# Patient Record
Sex: FEMALE | Race: WHITE | NOT HISPANIC OR LATINO | Employment: UNEMPLOYED | URBAN - METROPOLITAN AREA
[De-identification: names, ages, dates, MRNs, and addresses within clinical notes are randomized per-mention and may not be internally consistent; named-entity substitution may affect disease eponyms.]

---

## 2017-03-27 ENCOUNTER — OFFICE VISIT (OUTPATIENT)
Dept: FAMILY MEDICINE | Facility: CLINIC | Age: 42
End: 2017-03-27
Payer: COMMERCIAL

## 2017-03-27 VITALS
DIASTOLIC BLOOD PRESSURE: 73 MMHG | BODY MASS INDEX: 36.14 KG/M2 | OXYGEN SATURATION: 97 % | TEMPERATURE: 100.5 F | WEIGHT: 204 LBS | HEIGHT: 63 IN | RESPIRATION RATE: 18 BRPM | SYSTOLIC BLOOD PRESSURE: 102 MMHG | HEART RATE: 91 BPM

## 2017-03-27 DIAGNOSIS — J45.20 MILD INTERMITTENT ASTHMA WITHOUT COMPLICATION: Primary | ICD-10-CM

## 2017-03-27 DIAGNOSIS — H01.00B BLEPHARITIS OF BOTH UPPER AND LOWER EYELID OF LEFT EYE, UNSPECIFIED TYPE: ICD-10-CM

## 2017-03-27 DIAGNOSIS — J01.90 ACUTE SINUSITIS WITH SYMPTOMS > 10 DAYS: ICD-10-CM

## 2017-03-27 PROCEDURE — 99214 OFFICE O/P EST MOD 30 MIN: CPT | Performed by: FAMILY MEDICINE

## 2017-03-27 NOTE — MR AVS SNAPSHOT
After Visit Summary   3/27/2017    Doretha Fernandez    MRN: [id number]           Patient Information     Date Of Birth          1975        Visit Information        Provider Department      3/27/2017 9:00 AM Anna Naidu MD [facility]        Today's Diagnoses     Mild intermittent asthma without complication    -  1    Acute sinusitis with symptoms > 10 days        Blepharitis of both upper and lower eyelid of left eye, unspecified type          Care Instructions          Thank you for choosing [facility].  You may be receiving a survey in the mail from [facility] regarding your visit today.  Please take a few minutes to complete and return the survey to let us know how we are doing.      Our Clinic hours are:  Mondays    7:20 am - 7 pm  Tues -  Fri  7:20 am - 5 pm    Clinic Phone: [phone]    The clinic lab opens at 7:30 am Mon - Fri and appointments are required.    [facility]  Ph. [phone]  Monday-Thursday 8 am - 7pm  Tues/Wed/Fri 8 am - 5:30 pm                 Follow-ups after your visit        Who to contact     If you have questions or need follow up information about today's clinic visit or your schedule please contact [facility] directly at [phone].  Normal or non-critical lab and imaging results will be communicated to you by DroneDeployhart, letter or phone within 4 business days after the clinic has received the results. If you do not hear from us within 7 days, please contact the clinic through DroneDeployhart or phone. If you have a critical or abnormal lab result, we will notify you by phone as soon as possible.  Submit refill requests through viseto or call your pharmacy and they will forward the refill request to us. Please allow 3 business days for your refill to be completed.          Additional Information About Your Visit        viseto Information     viseto gives you secure access to your electronic health  "record. If you see a primary care provider, you can also send messages to your care team and make appointments. If you have questions, please call your primary care clinic.  If you do not have a primary care provider, please call [phone] and they will assist you.        Care EveryWhere ID     This is your Care EveryWhere ID. This could be used by other organizations to access your [place] medical records  [id number]        Your Vitals Were     Pulse Temperature Respirations Height Pulse Oximetry BMI (Body Mass Index)    91 100.5  F (38.1  C) (Tympanic) 18 5' 3\" (1.6 m) 97% 36.14 kg/m2       Blood Pressure from Last 3 Encounters:   03/27/17 102/73   12/16/16 116/81   12/14/16 115/74    Weight from Last 3 Encounters:   03/27/17 204 lb (92.5 kg)   12/16/16 202 lb (91.6 kg)   12/14/16 203 lb (92.1 kg)              Today, you had the following     No orders found for display         Today's Medication Changes          These changes are accurate as of: 3/27/17  9:43 AM.  If you have any questions, ask your nurse or doctor.               Start taking these medicines.        Dose/Directions    amoxicillin-clavulanate 875-125 MG per tablet   Commonly known as:  AUGMENTIN   Used for:  Acute sinusitis with symptoms > 10 days   Started by:  Anna Naidu MD        Dose:  1 tablet   Take 1 tablet by mouth 2 times daily   Quantity:  20 tablet   Refills:  0            Where to get your medicines      Some of these will need a paper prescription and others can be bought over the counter.  Ask your nurse if you have questions.     Bring a paper prescription for each of these medications     amoxicillin-clavulanate 875-125 MG per tablet                Primary Care Provider Office Phone # Fax #    Anna Naidu [phone]       [facility] [address] [address]        Thank you!     Thank you for choosing [facility]  for your care. Our goal is always to provide " you with excellent care. Hearing back from our patients is one way we can continue to improve our services. Please take a few minutes to complete the written survey that you may receive in the mail after your visit with us. Thank you!             Your Updated Medication List - Protect others around you: Learn how to safely use, store and throw away your medicines at www.disposemymeds.org.          This list is accurate as of: 3/27/17  9:43 AM.  Always use your most recent med list.                   Brand Name Dispense Instructions for use    amoxicillin-clavulanate 875-125 MG per tablet    AUGMENTIN    20 tablet    Take 1 tablet by mouth 2 times daily       aspirin 81 MG tablet     30    1 TABLET DAILY       FLUoxetine 20 MG capsule    PROzac    270 capsule    Take 3 capsules (60 mg) by mouth daily 12/6/16 -- reduced to 40 mg daily       fluticasone 50 MCG/ACT spray    FLONASE    1 g    Spray 2 sprays into both nostrils daily       LORazepam 1 MG tablet    ATIVAN    30 tablet    Take 1 tablet (1 mg) by mouth every 8 hours as needed for anxiety       OMEPRAZOLE PO      Take 20 mg by mouth daily.       traMADol 50 MG tablet    ULTRAM    40 tablet    Take 1-2 tablets ( mg) by mouth every 6 hours as needed for pain maximum 8 tablet(s) per day       TYLENOL PO      Take 1,000 mg by mouth every 6 hours as needed for mild pain or fever

## 2017-03-27 NOTE — NURSING NOTE
"Chief Complaint   Patient presents with     Ent Problem       Initial /73  Pulse 91  Temp 100.5  F (38.1  C) (Tympanic)  Resp 18  Ht 5' 3\" (1.6 m)  Wt 204 lb (92.5 kg)  SpO2 97%  BMI 36.14 kg/m2 Estimated body mass index is 36.14 kg/(m^2) as calculated from the following:    Height as of this encounter: 5' 3\" (1.6 m).    Weight as of this encounter: 204 lb (92.5 kg).  Medication Reconciliation: complete    "

## 2017-03-27 NOTE — PROGRESS NOTES
"  SUBJECTIVE:                                                    Doretha Fernandez is a 42 year old female who presents to clinic today for the following health issues:      Acute Illness   Acute illness concerns: left eye infection, heavy chest, sinus  Onset: 1 week+    Fever: no     Chills/Sweats: YES    Headache (location?): YES    Sinus Pressure:YES    Conjunctivitis:  YES: left    Ear Pain: YES: both    Rhinorrhea: YES    Congestion: YES    Sore Throat: no      Cough: YES-non-productive    Wheeze: YES    Decreased Appetite: YES    Nausea: no     Vomiting: no     Diarrhea:  no     Dysuria/Freq.: no     Fatigue/Achiness: YES    Sick/Strep Exposure: YES- family members     Therapies Tried and outcome: benadryl, tylenol    Also having left eye pain and some discharge.  She's had a stye on that eye for a \"long time\" but also some irritation along the lash line and some discharge, worse in the morning.     Hasn't been using inhaler at all.     /73  Pulse 91  Temp 100.5  F (38.1  C) (Tympanic)  Resp 18  Ht 5' 3\" (1.6 m)  Wt 204 lb (92.5 kg)  SpO2 97%  BMI 36.14 kg/m2  EXAM: GENERAL APPEARANCE: Alert, no acute distress  EYES: eyelids/periorbital - hordeolum/sty left upper lid.  Mild irritation of the lash line  HENT: Ears and TMs normal, oral mucosa and posterior oropharynx normal  RESP: lungs clear to auscultation   CV: normal rate, regular rhythm, no murmur or gallop  ABDOMEN: soft, no organomegaly, masses or tenderness      ASSESSMENT/PLAN:      ICD-10-CM    1. Mild intermittent asthma without complication J45.20    2. Acute sinusitis with symptoms > 10 days J01.90 amoxicillin-clavulanate (AUGMENTIN) 875-125 MG per tablet   3. Blepharitis of both upper and lower eyelid of left eye, unspecified type H01.004     H01.005      Encouraged to use her inhaler four times daily   Flonase, netti pot.  Prescription for augmentin to use in 4-5 days if symptoms continue to worsen (for sinus infection).  She has had c " diff in the past so she understands the risk of antibiotics.  Asked to start probiotics if she starts the antibiotics.    Baby shampoo to the eyelids on a q tip to help with the blepharitis.    Anna Naidu M.D.      Patient Instructions         Thank you for choosing [facility].  You may be receiving a survey in the mail from [facility] regarding your visit today.  Please take a few minutes to complete and return the survey to let us know how we are doing.      Our Clinic hours are:  Mondays    7:20 am - 7 pm  Tues -  Fri  7:20 am - 5 pm    Clinic Phone: [phone]    The clinic lab opens at 7:30 am Mon - Fri and appointments are required.    [facility]. [phone]  Monday-Thursday 8 am - 7pm  Tues/Wed/Fri 8 am - 5:30 pm

## 2017-03-27 NOTE — PATIENT INSTRUCTIONS
Thank you for choosing [facility].  You may be receiving a survey in the mail from [facility] regarding your visit today.  Please take a few minutes to complete and return the survey to let us know how we are doing.      Our Clinic hours are:  Mondays    7:20 am - 7 pm  Tues -  Fri  7:20 am - 5 pm    Clinic Phone: [phone]    The clinic lab opens at 7:30 am Mon - Fri and appointments are required.    [facility]. [phone]  Monday-Thursday 8 am - 7pm  Tues/Wed/Fri 8 am - 5:30 pm

## 2017-03-28 ASSESSMENT — ASTHMA QUESTIONNAIRES: ACT_TOTALSCORE: 23

## 2017-03-28 ASSESSMENT — PATIENT HEALTH QUESTIONNAIRE - PHQ9: SUM OF ALL RESPONSES TO PHQ QUESTIONS 1-9: 2

## 2017-04-01 ENCOUNTER — TELEPHONE (OUTPATIENT)
Dept: NURSING | Facility: CLINIC | Age: 42
End: 2017-04-01

## 2017-04-01 ENCOUNTER — APPOINTMENT (OUTPATIENT)
Dept: MRI IMAGING | Facility: CLINIC | Age: 42
DRG: 103 | End: 2017-04-01
Attending: EMERGENCY MEDICINE
Payer: COMMERCIAL

## 2017-04-01 ENCOUNTER — HOSPITAL ENCOUNTER (INPATIENT)
Facility: CLINIC | Age: 42
LOS: 4 days | Discharge: HOME | DRG: 103 | End: 2017-04-05
Attending: EMERGENCY MEDICINE | Admitting: FAMILY MEDICINE
Payer: COMMERCIAL

## 2017-04-01 DIAGNOSIS — G44.201 INTRACTABLE TENSION-TYPE HEADACHE, UNSPECIFIED CHRONICITY PATTERN: ICD-10-CM

## 2017-04-01 DIAGNOSIS — D68.52 PROTHROMBIN MUTATION (H): ICD-10-CM

## 2017-04-01 DIAGNOSIS — D68.2 PROTHROMBIN DEFICIENCY (H): ICD-10-CM

## 2017-04-01 DIAGNOSIS — H53.9 VISION CHANGES: ICD-10-CM

## 2017-04-01 PROBLEM — G44.209 ACUTE NON INTRACTABLE TENSION-TYPE HEADACHE: Status: ACTIVE | Noted: 2017-04-01

## 2017-04-01 LAB
ANION GAP SERPL CALCULATED.3IONS-SCNC: 7 MMOL/L (ref 3–14)
APTT PPP: 27 SEC (ref 22–37)
BASOPHILS # BLD AUTO: 0 10E9/L (ref 0–0.2)
BASOPHILS NFR BLD AUTO: 0.3 %
BUN SERPL-MCNC: 14 MG/DL (ref 7–30)
CALCIUM SERPL-MCNC: 9 MG/DL (ref 8.5–10.1)
CHLORIDE SERPL-SCNC: 104 MMOL/L (ref 94–109)
CO2 SERPL-SCNC: 27 MMOL/L (ref 20–32)
CREAT SERPL-MCNC: 0.66 MG/DL (ref 0.52–1.04)
CRP SERPL-MCNC: 4.4 MG/L (ref 0–8)
DIFFERENTIAL METHOD BLD: NORMAL
EOSINOPHIL # BLD AUTO: 0.1 10E9/L (ref 0–0.7)
EOSINOPHIL NFR BLD AUTO: 0.8 %
ERYTHROCYTE [DISTWIDTH] IN BLOOD BY AUTOMATED COUNT: 12.5 % (ref 10–15)
ERYTHROCYTE [SEDIMENTATION RATE] IN BLOOD BY WESTERGREN METHOD: 16 MM/H (ref 0–20)
GFR SERPL CREATININE-BSD FRML MDRD: NORMAL ML/MIN/1.7M2
GLUCOSE SERPL-MCNC: 84 MG/DL (ref 70–99)
HCT VFR BLD AUTO: 36.7 % (ref 35–47)
HGB BLD-MCNC: 12.1 G/DL (ref 11.7–15.7)
IMM GRANULOCYTES # BLD: 0 10E9/L (ref 0–0.4)
IMM GRANULOCYTES NFR BLD: 0.5 %
INR PPP: 0.92 (ref 0.86–1.14)
LYMPHOCYTES # BLD AUTO: 2.5 10E9/L (ref 0.8–5.3)
LYMPHOCYTES NFR BLD AUTO: 37 %
MCH RBC QN AUTO: 28.3 PG (ref 26.5–33)
MCHC RBC AUTO-ENTMCNC: 33 G/DL (ref 31.5–36.5)
MCV RBC AUTO: 86 FL (ref 78–100)
MONOCYTES # BLD AUTO: 0.4 10E9/L (ref 0–1.3)
MONOCYTES NFR BLD AUTO: 6 %
NEUTROPHILS # BLD AUTO: 3.7 10E9/L (ref 1.6–8.3)
NEUTROPHILS NFR BLD AUTO: 55.4 %
PLATELET # BLD AUTO: 247 10E9/L (ref 150–450)
POTASSIUM SERPL-SCNC: 3.8 MMOL/L (ref 3.4–5.3)
RBC # BLD AUTO: 4.28 10E12/L (ref 3.8–5.2)
SODIUM SERPL-SCNC: 138 MMOL/L (ref 133–144)
WBC # BLD AUTO: 6.6 10E9/L (ref 4–11)

## 2017-04-01 PROCEDURE — 25000125 ZZHC RX 250: Performed by: FAMILY MEDICINE

## 2017-04-01 PROCEDURE — 85610 PROTHROMBIN TIME: CPT | Performed by: EMERGENCY MEDICINE

## 2017-04-01 PROCEDURE — 99222 1ST HOSP IP/OBS MODERATE 55: CPT | Mod: AI | Performed by: FAMILY MEDICINE

## 2017-04-01 PROCEDURE — 93010 ELECTROCARDIOGRAM REPORT: CPT | Performed by: EMERGENCY MEDICINE

## 2017-04-01 PROCEDURE — 25000132 ZZH RX MED GY IP 250 OP 250 PS 637: Performed by: EMERGENCY MEDICINE

## 2017-04-01 PROCEDURE — 96361 HYDRATE IV INFUSION ADD-ON: CPT

## 2017-04-01 PROCEDURE — 70553 MRI BRAIN STEM W/O & W/DYE: CPT

## 2017-04-01 PROCEDURE — 85730 THROMBOPLASTIN TIME PARTIAL: CPT | Performed by: EMERGENCY MEDICINE

## 2017-04-01 PROCEDURE — A9585 GADOBUTROL INJECTION: HCPCS | Performed by: EMERGENCY MEDICINE

## 2017-04-01 PROCEDURE — 99285 EMERGENCY DEPT VISIT HI MDM: CPT | Mod: 25

## 2017-04-01 PROCEDURE — 85025 COMPLETE CBC W/AUTO DIFF WBC: CPT | Performed by: EMERGENCY MEDICINE

## 2017-04-01 PROCEDURE — G0378 HOSPITAL OBSERVATION PER HR: HCPCS

## 2017-04-01 PROCEDURE — 25500064 ZZH RX 255 OP 636: Performed by: EMERGENCY MEDICINE

## 2017-04-01 PROCEDURE — 99285 EMERGENCY DEPT VISIT HI MDM: CPT | Mod: 25 | Performed by: EMERGENCY MEDICINE

## 2017-04-01 PROCEDURE — 85652 RBC SED RATE AUTOMATED: CPT | Performed by: EMERGENCY MEDICINE

## 2017-04-01 PROCEDURE — 12000007 ZZH R&B INTERMEDIATE

## 2017-04-01 PROCEDURE — 96360 HYDRATION IV INFUSION INIT: CPT

## 2017-04-01 PROCEDURE — 86140 C-REACTIVE PROTEIN: CPT | Performed by: EMERGENCY MEDICINE

## 2017-04-01 PROCEDURE — 80048 BASIC METABOLIC PNL TOTAL CA: CPT | Performed by: EMERGENCY MEDICINE

## 2017-04-01 PROCEDURE — 25000128 H RX IP 250 OP 636: Performed by: EMERGENCY MEDICINE

## 2017-04-01 PROCEDURE — 25000132 ZZH RX MED GY IP 250 OP 250 PS 637: Performed by: FAMILY MEDICINE

## 2017-04-01 PROCEDURE — 93005 ELECTROCARDIOGRAM TRACING: CPT

## 2017-04-01 RX ORDER — IBUPROFEN 600 MG/1
600 TABLET, FILM COATED ORAL EVERY 6 HOURS PRN
Status: DISCONTINUED | OUTPATIENT
Start: 2017-04-01 | End: 2017-04-01

## 2017-04-01 RX ORDER — ACETAMINOPHEN 500 MG
1000 TABLET ORAL ONCE
Status: COMPLETED | OUTPATIENT
Start: 2017-04-01 | End: 2017-04-01

## 2017-04-01 RX ORDER — NALOXONE HYDROCHLORIDE 0.4 MG/ML
.1-.4 INJECTION, SOLUTION INTRAMUSCULAR; INTRAVENOUS; SUBCUTANEOUS
Status: DISCONTINUED | OUTPATIENT
Start: 2017-04-01 | End: 2017-04-05 | Stop reason: HOSPADM

## 2017-04-01 RX ORDER — HYDROCODONE BITARTRATE AND ACETAMINOPHEN 5; 325 MG/1; MG/1
1-2 TABLET ORAL EVERY 4 HOURS PRN
Status: DISCONTINUED | OUTPATIENT
Start: 2017-04-01 | End: 2017-04-02

## 2017-04-01 RX ORDER — TETRACAINE HYDROCHLORIDE 5 MG/ML
SOLUTION OPHTHALMIC
Status: DISCONTINUED
Start: 2017-04-01 | End: 2017-04-01 | Stop reason: HOSPADM

## 2017-04-01 RX ORDER — CALCIUM CARBONATE 500 MG/1
1 TABLET, CHEWABLE ORAL DAILY PRN
COMMUNITY
End: 2017-06-05

## 2017-04-01 RX ORDER — LEVETIRACETAM 250 MG/1
250 TABLET ORAL 2 TIMES DAILY
Status: DISCONTINUED | OUTPATIENT
Start: 2017-04-01 | End: 2017-04-05 | Stop reason: HOSPADM

## 2017-04-01 RX ORDER — NALOXONE HYDROCHLORIDE 0.4 MG/ML
.1-.4 INJECTION, SOLUTION INTRAMUSCULAR; INTRAVENOUS; SUBCUTANEOUS
Status: DISCONTINUED | OUTPATIENT
Start: 2017-04-01 | End: 2017-04-04

## 2017-04-01 RX ORDER — ASPIRIN 81 MG/1
81 TABLET, CHEWABLE ORAL ONCE
Status: COMPLETED | OUTPATIENT
Start: 2017-04-01 | End: 2017-04-01

## 2017-04-01 RX ORDER — ACETAMINOPHEN 500 MG
1000 TABLET ORAL EVERY 6 HOURS PRN
Status: DISCONTINUED | OUTPATIENT
Start: 2017-04-01 | End: 2017-04-02 | Stop reason: DRUGHIGH

## 2017-04-01 RX ORDER — GADOBUTROL 604.72 MG/ML
9 INJECTION INTRAVENOUS ONCE
Status: DISCONTINUED | OUTPATIENT
Start: 2017-04-01 | End: 2017-04-01

## 2017-04-01 RX ORDER — ONDANSETRON 2 MG/ML
4 INJECTION INTRAMUSCULAR; INTRAVENOUS EVERY 6 HOURS PRN
Status: DISCONTINUED | OUTPATIENT
Start: 2017-04-01 | End: 2017-04-05 | Stop reason: HOSPADM

## 2017-04-01 RX ORDER — ONDANSETRON 4 MG/1
4 TABLET, ORALLY DISINTEGRATING ORAL EVERY 6 HOURS PRN
Status: DISCONTINUED | OUTPATIENT
Start: 2017-04-01 | End: 2017-04-05 | Stop reason: HOSPADM

## 2017-04-01 RX ADMIN — ASPIRIN 81 MG 162 MG: 81 TABLET ORAL at 16:31

## 2017-04-01 RX ADMIN — SODIUM CHLORIDE 1000 ML: 9 INJECTION, SOLUTION INTRAVENOUS at 14:28

## 2017-04-01 RX ADMIN — LEVETIRACETAM 250 MG: 250 TABLET, FILM COATED ORAL at 19:36

## 2017-04-01 RX ADMIN — DEXTROSE AND SODIUM CHLORIDE: 5; 900 INJECTION, SOLUTION INTRAVENOUS at 19:36

## 2017-04-01 RX ADMIN — GADOBUTROL 9 ML: 604.72 INJECTION INTRAVENOUS at 14:37

## 2017-04-01 RX ADMIN — ACETAMINOPHEN 1000 MG: 500 TABLET ORAL at 16:32

## 2017-04-01 RX ADMIN — HYDROCODONE BITARTRATE AND ACETAMINOPHEN 1 TABLET: 5; 325 TABLET ORAL at 19:40

## 2017-04-01 ASSESSMENT — ACTIVITIES OF DAILY LIVING (ADL)
FALL_HISTORY_WITHIN_LAST_SIX_MONTHS: NO
COGNITION: 0 - NO COGNITION ISSUES REPORTED
SWALLOWING: 0-->SWALLOWS FOODS/LIQUIDS WITHOUT DIFFICULTY
TOILETING: 0-->INDEPENDENT
BATHING: 0-->INDEPENDENT
SWALLOWING: 0-->SWALLOWS FOODS/LIQUIDS WITHOUT DIFFICULTY
COMMUNICATION: 0-->UNDERSTANDS/COMMUNICATES WITHOUT DIFFICULTY
RETIRED_EATING: 0-->INDEPENDENT
EATING: 0-->INDEPENDENT
TRANSFERRING: 0-->INDEPENDENT
TRANSFERRING: 0-->INDEPENDENT
AMBULATION: 0-->INDEPENDENT
DRESS: 0-->INDEPENDENT
AMBULATION: 0-->INDEPENDENT
BATHING: 0-->INDEPENDENT
RETIRED_COMMUNICATION: 0-->UNDERSTANDS/COMMUNICATES WITHOUT DIFFICULTY
CHANGE_IN_FUNCTIONAL_STATUS_SINCE_ONSET_OF_CURRENT_ILLNESS/INJURY: NO
TOILETING: 0-->INDEPENDENT
DRESS: 0-->INDEPENDENT

## 2017-04-01 ASSESSMENT — VISUAL ACUITY
OD: 20/25
OS: 20/30

## 2017-04-01 NOTE — PROGRESS NOTES
"[place] [facility] ADMISSION NOTE    Patient admitted to room 2306 at approximately 1725 via cart from emergency room. Patient was accompanied by transport tech.     Verbal SBAR report received from Zee MARR prior to patient arrival.     Patient ambulated to bed independently. Patient alert and oriented X 3. Pain is not well controlled.  Medication(s) being used: acetaminophen. 0-10 Pain Scale: 4. Admission vital signs: Blood pressure 123/78, pulse 64, temperature 98.4  F (36.9  C), temperature source Oral, resp. rate 18, height 1.6 m (5' 3\"), weight 93.5 kg (206 lb 2.1 oz), last menstrual period 03/13/2017, SpO2 100 %, not currently breastfeeding. Patient was oriented to plan of care, call light, bed controls, tv, telephone, bathroom and visiting hours.     The following safety risks were identified during admission: none. Yellow risk band applied: YES.     Ana Delaney    "

## 2017-04-01 NOTE — ED PROVIDER NOTES
"  History     Chief Complaint   Patient presents with     Eye Problem     vision blurs: off and on over the last day vision goes and comes left eye     HPI  Doretha Fernandez is a 42 year old female with a history of prothrombin deficiency and stroke who presents to the ED for left eye pressure and blurred vision onset last night. The patient states the blurred vision began suddenly last night at 11 pm and slowly cleared up, however she has had intermittent blurry vision since, currently mild. The eye is not painful but there is a pressure there. She has never had symptoms like this before. The patient does have a stye on her left eye which she has been treating with OTC eye drops, however she has not used them for a week. She has a headache as well, but notes she does get headaches from time to time and this one is not unusual. She says her left arm and left leg feel \"tingly\" but not weak or numb. No changes in coordination. No nausea. No recent head injury. With her prior stroke she experienced complete left-sided weakness and numbness, and is not experiencing this currently. She has not been taking her baby aspirin over the last few months. She has not seen a neurologist for several years. No personal or family history of glaucoma.     Patient Active Problem List   Diagnosis     CARDIOVASCULAR SCREENING; LDL GOAL LESS THAN 160     DUB (dysfunctional uterine bleeding)     Anxiety state     Esophageal reflux     Mild major depression (H)     Prothrombin deficiency (H)     Mild intermittent asthma without complication     Vision changes     Acute non intractable tension-type headache     No current outpatient prescriptions on file.     Allergies   Allergen Reactions     Erythrocin Nausea and Vomiting     Zithromax [Azithromycin Dihydrate] Diarrhea       I have reviewed the Medications, Allergies, Past Medical and Surgical History, and Social History in the Epic system.    Review of Systems  All other systems are reviewed " "and are negative.    Physical Exam   BP: 137/81  Pulse: 72  Temp: 98.6  F (37  C)  Resp: 16  Height: 160 cm (5' 3\")  Weight: 90.7 kg (200 lb)  SpO2: 99 %  Physical Exam   Nontoxic-appearing no respiratory distress alert and oriented x3.  Head atraumatic normocephalic  Cranial nerves; vision baseline fields intact, PERRL, EOMI, facial sensation intact to light touch, facial muscle tone intact and symmetrical, hearing grossly intact,swallowing without difficulty, voice baseline, SCM  strength intact, tongue protrudes midline.  TM's unremarkable, EACs clear, oropharynx moist without lesions or erythema, palatal elevation symmetric, neck supple full active painless range of motion no posterior midline tenderness.  Lungs clear to auscultation no rales rhonchi or wheezes  Heart regular no murmur S3 or rub  Abdomen soft nontender bowel sounds positive no masses or HSM  Strength and sensation intact throughout the extremities, skin clear from rash or lesion.    Left eye pressure 17  Visual acuity 20/25 right, 20/30 left    ED Course     ED Course     Procedures             Critical Care time:  none   EKG: Normal sinus rhythm rate 65, axis and intervals normal, T-wave inversion V1 and V2, unchanged from previous, no acute ST or T-wave changes, Chantelle Keenan.            Labs Ordered and Resulted from Time of ED Arrival Up to the Time of Departure from the ED   CBC WITH PLATELETS DIFFERENTIAL   BASIC METABOLIC PANEL   INR   ERYTHROCYTE SEDIMENTATION RATE AUTO   CRP INFLAMMATION   PARTIAL THROMBOPLASTIN TIME       Results for orders placed or performed during the hospital encounter of 04/01/17 (from the past 24 hour(s))   CBC with platelets, differential   Result Value Ref Range    WBC 6.6 4.0 - 11.0 10e9/L    RBC Count 4.28 3.8 - 5.2 10e12/L    Hemoglobin 12.1 11.7 - 15.7 g/dL    Hematocrit 36.7 35.0 - 47.0 %    MCV 86 78 - 100 fl    MCH 28.3 26.5 - 33.0 pg    MCHC 33.0 31.5 - 36.5 g/dL    RDW 12.5 10.0 - 15.0 %    " Platelet Count 247 150 - 450 10e9/L    Diff Method Automated Method     % Neutrophils 55.4 %    % Lymphocytes 37.0 %    % Monocytes 6.0 %    % Eosinophils 0.8 %    % Basophils 0.3 %    % Immature Granulocytes 0.5 %    Absolute Neutrophil 3.7 1.6 - 8.3 10e9/L    Absolute Lymphocytes 2.5 0.8 - 5.3 10e9/L    Absolute Monocytes 0.4 0.0 - 1.3 10e9/L    Absolute Eosinophils 0.1 0.0 - 0.7 10e9/L    Absolute Basophils 0.0 0.0 - 0.2 10e9/L    Abs Immature Granulocytes 0.0 0 - 0.4 10e9/L   Basic metabolic panel   Result Value Ref Range    Sodium 138 133 - 144 mmol/L    Potassium 3.8 3.4 - 5.3 mmol/L    Chloride 104 94 - 109 mmol/L    Carbon Dioxide 27 20 - 32 mmol/L    Anion Gap 7 3 - 14 mmol/L    Glucose 84 70 - 99 mg/dL    Urea Nitrogen 14 7 - 30 mg/dL    Creatinine 0.66 0.52 - 1.04 mg/dL    GFR Estimate >90  Non  GFR Calc   >60 mL/min/1.7m2    GFR Estimate If Black >90   GFR Calc   >60 mL/min/1.7m2    Calcium 9.0 8.5 - 10.1 mg/dL   INR   Result Value Ref Range    INR 0.92 0.86 - 1.14   Erythrocyte sedimentation rate auto   Result Value Ref Range    Sed Rate 16 0 - 20 mm/h   CRP Inflammation   Result Value Ref Range    CRP Inflammation 4.4 0.0 - 8.0 mg/L   Partial thromboplastin time   Result Value Ref Range    PTT 27 22 - 37 sec   MR Brain w/o & w Contrast    Narrative    MRI OF THE BRAIN WITHOUT AND WITH CONTRAST 4/1/2017 3:10 PM     COMPARISON: Brain MRI 12/1/2016.    HISTORY:  Waxing and waning left visual field change, tingling left  arm and leg, history of stroke.     TECHNIQUE: Multi-sequence, multi-planar MRI images of the brain were  acquired before and after the administration of IV gadolinium (9 mL  Gadavist).    FINDINGS: Abnormal T2 signal hyperintensity in the cortex and  subcortical white matter of the posterior tip of the right parietal  lobe is again noted without definite change from the comparison study.  Differential diagnosis is unchanged including migrational  abnormality,  previous ischemic or traumatic insult and low-grade tumor. Gray-white  differentiation of the brain is otherwise normal.    The ventricles and basal cisterns are normal in configuration. There  is no midline shift. There are no extra-axial fluid collections. There  is no evidence for stroke or acute intracranial hemorrhage. There is  no abnormal contrast enhancement in the brain or its coverings.    There is no sinusitis or mastoiditis.      Impression    IMPRESSION:  1. Stable area of abnormal T2 signal hyperintensity involving the  cortex and subcortical white matter at the posterior pole of the right  parietal lobe with differential as above. Seizure activity arising  from this area of abnormal signal could be responsible for the  patient's symptoms.  2. Otherwise, normal brain MRI. No evidence for acute intracranial  pathology.        Medications   naloxone (NARCAN) injection 0.1-0.4 mg (not administered)   levETIRAcetam (KEPPRA) tablet 250 mg (250 mg Oral Given 4/1/17 1936)   acetaminophen (TYLENOL) tablet 1,000 mg (not administered)   FLUoxetine (PROzac) capsule 40 mg (not administered)   naloxone (NARCAN) injection 0.1-0.4 mg (not administered)   dextrose 5% and 0.9% NaCl infusion ( Intravenous New Bag 4/1/17 1936)   HYDROcodone-acetaminophen (NORCO) 5-325 MG per tablet 1-2 tablet (1 tablet Oral Given 4/1/17 1940)   ondansetron (ZOFRAN-ODT) ODT tab 4 mg (not administered)     Or   ondansetron (ZOFRAN) injection 4 mg (not administered)   0.9% sodium chloride BOLUS (1,000 mLs Intravenous New Bag 4/1/17 1428)   aspirin chewable tablet 81 mg (162 mg Oral Given 4/1/17 1631)   acetaminophen (TYLENOL) tablet 1,000 mg (1,000 mg Oral Given 4/1/17 1632)       1:20 PM Patient assessed.     Assessments & Plan (with Medical Decision Making)  42-year-old female presents with waxing and waning left visual change, history of stroke, MRI shows abnormal T2 hyperintensity cortex and subcortical white matter  posterior pole of right parietal lobe, unchanged from previous MRI.  Reviewed with Dr. Zamudio [facility] stroke neurology, recommends admission for observation, starting Keppra empirically, thinking that visual change may represent seizure activity secondary to chronic right parietal lesion.  He reviewed the MRI studies.  Patient is admitted here for neurologic checks, continue 81 mg aspirin, reviewed with Dr. Treviño who accepts for admission.       I have reviewed the nursing notes.    I have reviewed the findings, diagnosis, plan and need for follow up with the patient.    Current Discharge Medication List          Final diagnoses:   Vision changes   Prothrombin deficiency (H)     This document serves as a record of the services and decisions personally performed and made by Cezar Keenan MD. It was created on HIS/HER behalf by Nini Martinez, a trained medical scribe. The creation of this document is based the provider's statements to the medical scribe.  Nini Martinez 1:20 PM 4/1/2017    Provider:   The information in this document, created by the medical scribe for me, accurately reflects the services I personally performed and the decisions made by me. I have reviewed and approved this document for accuracy prior to leaving the patient care area.  Cezar Keenan MD 1:20 PM 4/1/2017 4/1/2017   [facility] EMERGENCY DEPARTMENT     Cezar Keenan MD  04/01/17 3635

## 2017-04-01 NOTE — IP AVS SNAPSHOT
MRN:[id number]                      After Visit Summary   4/1/2017    Doretha Fernandez    MRN: [id number]           Thank you!     Thank you for choosing [facility] for your care. Our goal is always to provide you with excellent care. Hearing back from our patients is one way we can continue to improve our services. Please take a few minutes to complete the written survey that you may receive in the mail after you visit with us. Thank you!        Patient Information     Date Of Birth          1975        Designated Caregiver       Most Recent Value    Caregiver    Will someone help with your care after discharge? no      About your hospital stay     You were admitted on:  April 1, 2017 You last received care in the:  [facility]    You were discharged on:  April 5, 2017       Who to Call     For medical emergencies, please call 911.  For non-urgent questions about your medical care, please call your primary care provider or clinic, [phone]          Attending Provider     Provider Specialty    Cezar Keenan MD Emergency Medicine    Former[facility], Marcela Leal MD [facility]    Gomez Mejia MD Internal Medicine    Aurora Rodrigues MD [facility]mael jones MD [facility]       Primary Care Provider Office Phone # Fax #    Anna Naidu [phone]       [facility] [id number] [place] [address]        Your next 10 appointments already scheduled     Apr 06, 2017  8:00 AM CDT   EEG with [facility] ([facility])    [address] [place] [address]   [phone]            Apr 13, 2017 12:45 PM CDT   (Arrive by 12:30 PM)   New Visit with Diana Johnson MD   [facility]    [address] [place] [address]   [phone]            May 05, 2017  2:00 PM CDT   New Visit with Kathy Richards MD   [facility] ([facility])    [facility]  [address]  [facility] [address]   [phone]              Additional Services     NEUROLOGY ADULT REFERRAL       Your provider has referred you to: [facility]: [facility] [phone]   [email or web address]    Reason for Referral: Consult    Please be aware that coverage of these services is subject to the terms and limitations of your health insurance plan.  Call member services at your health plan with any benefit or coverage questions.      Please bring the following with you to your appointment:    (1) Any X-Rays, CTs or MRIs which have been performed.  Contact the facility where they were done to arrange for  prior to your scheduled appointment.    (2) List of current medications  (3) This referral request   (4) Any documents/labs given to you for this referral            ONC/HEME ADULT REFERRAL       Your provider has referred you to: [facility]    Please be aware that coverage of these services is subject to the terms and limitations of your health insurance plan.  Call member services at your health plan with any benefit or coverage questions.      Please bring the following with you to your appointment:    (1) Any X-Rays, CTs or MRIs which have been performed.  Contact the facility where they were done to arrange for  prior to your scheduled appointment.   (2) List of current medications  (3) This referral request   (4) Any documents/labs given to you for this referral                  Future tests that were ordered for you     [facility] EEG ROUTINE       Rule out seizure                  Further instructions from your care team       Follow-up for EEG tomorrow  Follow-up with neurology on 4/13  Follow-up with hematology on 5/5 to discuss your prothrombin mutation and long-term recommendations for that.    Follow-up with the ophthalmologist in 1-2 weeks to reassess eyelid as per their  "recommendations.    No driving until assessment by neurology, then per their recommendations     Pending Results     No orders found from 3/30/2017 to 4/2/2017.            Statement of Approval     Ordered          04/05/17 1136  I have reviewed and agree with all the recommendations and orders detailed in this document.  EFFECTIVE NOW     Approved and electronically signed by:  Ismael Romero MD             Admission Information     Date & Time Provider Department Dept. Phone    4/1/2017 Ismael Romero MD [facility] [phone]      Your Vitals Were     Blood Pressure Pulse Temperature Respirations Height Weight    123/71 (BP Location: Right arm) 79 98  F (36.7  C) (Oral) 18 1.6 m (5' 3\") 93.5 kg (206 lb 2.1 oz)    Last Period Pulse Oximetry BMI (Body Mass Index)             03/13/2017 95% 36.51 kg/m2         MyChart Information     [facility] gives you secure access to your electronic health record. If you see a primary care provider, you can also send messages to your care team and make appointments. If you have questions, please call your primary care clinic.  If you do not have a primary care provider, please call [phone] and they will assist you.        Care EveryWhere ID     This is your Care EveryWhere ID. This could be used by other organizations to access your [facility] medical records  [id number]           Review of your medicines      START taking        Dose / Directions    aspirin 81 MG EC tablet        Dose:  81 mg   Take 1 tablet (81 mg) by mouth daily   Quantity:  30 tablet   Refills:  1       cyclobenzaprine 10 MG tablet   Commonly known as:  FLEXERIL   Used for:  Intractable tension-type headache, unspecified chronicity pattern   Notes to Patient:  You've had one dose so far today        Dose:  10 mg   Take 1 tablet (10 mg) by mouth 3 times daily as needed for muscle spasms   Quantity:  42 tablet   Refills:  0       levETIRAcetam 250 MG tablet   Commonly known as:  " KEPPRA   Used for:  Spells        Dose:  250 mg   Take 1 tablet (250 mg) by mouth 2 times daily   Quantity:  60 tablet   Refills:  0       traMADol 50 MG tablet   Commonly known as:  ULTRAM   Used for:  Intractable tension-type headache, unspecified chronicity pattern        Dose:   mg   Take 1-2 tablets ( mg) by mouth every 6 hours as needed   Quantity:  80 tablet   Refills:  0         CONTINUE these medicines which have NOT CHANGED        Dose / Directions    calcium carbonate 500 MG chewable tablet   Commonly known as:  TUMS   Notes to Patient:  Not given, resume if needed        Dose:  1 chew tab   Take 1 chew tab by mouth daily as needed for heartburn   Refills:  0       FLUoxetine 20 MG capsule   Commonly known as:  PROzac   Used for:  Major depressive disorder, single episode, mild (H)        Dose:  40 mg   Take 40 mg by mouth daily 12/6/16 -- reduced to 40 mg daily   Quantity:  270 capsule   Refills:  01       fluticasone 50 MCG/ACT spray   Commonly known as:  FLONASE   Used for:  Seasonal allergic rhinitis   Notes to Patient:  Not given, resume if using        Dose:  2 spray   Spray 2 sprays into both nostrils daily   Quantity:  1 g   Refills:  3       LORazepam 1 MG tablet   Commonly known as:  ATIVAN   Used for:  Anxiety, Acute intractable tension-type headache        Dose:  1 mg   Take 1 tablet (1 mg) by mouth every 8 hours as needed for anxiety   Quantity:  30 tablet   Refills:  0       TYLENOL PO   Notes to Patient:  Do not exceed 4,000 mg in 24 hours        Dose:  1000 mg   Take 1,000 mg by mouth every 6 hours as needed for mild pain or fever   Refills:  0         STOP taking     amoxicillin-clavulanate 875-125 MG per tablet   Commonly known as:  AUGMENTIN                Where to get your medicines      These medications were sent to [facility] [place], [place] - [address]  [address] [facility] [address]     Phone:  [phone]     aspirin 81 MG EC tablet     cyclobenzaprine 10 MG tablet    levETIRAcetam 250 MG tablet         Some of these will need a paper prescription and others can be bought over the counter. Ask your nurse if you have questions.     Bring a paper prescription for each of these medications     traMADol 50 MG tablet                Protect others around you: Learn how to safely use, store and throw away your medicines at www.disposemymeds.org.             Medication List: This is a list of all your medications and when to take them. Check marks below indicate your daily home schedule. Keep this list as a reference.      Medications           Morning Afternoon Evening Bedtime As Needed    aspirin 81 MG EC tablet   Take 1 tablet (81 mg) by mouth daily   Last time this was given:  81 mg on 4/5/2017  8:33 AM   Next Dose Due:  04/06/17                                   calcium carbonate 500 MG chewable tablet   Commonly known as:  TUMS   Take 1 chew tab by mouth daily as needed for heartburn   Notes to Patient:  Not given, resume if needed                                cyclobenzaprine 10 MG tablet   Commonly known as:  FLEXERIL   Take 1 tablet (10 mg) by mouth 3 times daily as needed for muscle spasms   Last time this was given:  10 mg on 4/5/2017 11:24 AM   Notes to Patient:  You've had one dose so far today                                   FLUoxetine 20 MG capsule   Commonly known as:  PROzac   Take 40 mg by mouth daily 12/6/16 -- reduced to 40 mg daily   Last time this was given:  40 mg on 4/5/2017  8:34 AM   Next Dose Due:  04/06/17                                   fluticasone 50 MCG/ACT spray   Commonly known as:  FLONASE   Spray 2 sprays into both nostrils daily   Notes to Patient:  Not given, resume if using                                levETIRAcetam 250 MG tablet   Commonly known as:  KEPPRA   Take 1 tablet (250 mg) by mouth 2 times daily   Last time this was given:  250 mg on 4/5/2017  8:34 AM   Next Dose Due:  04/05/17                                       LORazepam 1 MG tablet   Commonly known as:  ATIVAN   Take 1 tablet (1 mg) by mouth every 8 hours as needed for anxiety   Last time this was given:  1 mg on 4/3/2017  9:51 PM                                   traMADol 50 MG tablet   Commonly known as:  ULTRAM   Take 1-2 tablets ( mg) by mouth every 6 hours as needed   Last time this was given:  50 mg on 4/5/2017  8:33 AM                                   TYLENOL PO   Take 1,000 mg by mouth every 6 hours as needed for mild pain or fever   Last time this was given:  04/05/17  8:35 AM   Notes to Patient:  Do not exceed 4,000 mg in 24 hours                                             More Information        Patient Education    Levetiracetam Oral solution    Levetiracetam Oral tablet    Levetiracetam Oral tablet, extended-release    Levetiracetam Solution for injection  Levetiracetam Oral tablet  What is this medicine?  LEVETIRACETAM (ashok whaley RA, se cha) is an antiepileptic drug. It is used with other medicines to treat certain types of seizures.  This medicine may be used for other purposes; ask your health care provider or pharmacist if you have questions.  What should I tell my health care provider before I take this medicine?  They need to know if you have any of these conditions:    kidney disease    suicidal thoughts, plans, or attempt; a previous suicide attempt by you or a family member    an unusual or allergic reaction to levetiracetam, other medicines, foods, dyes, or preservatives    pregnant or trying to get pregnant    breast-feeding  How should I use this medicine?  Take this medicine by mouth with a glass of water. Follow the directions on the prescription label. Swallow the tablets whole. Do not crush or chew this medicine. You may take this medicine with or without food. Take your doses at regular intervals. Do not take your medicine more often than directed. Do not stop taking this medicine or any of your seizure medicines unless  instructed by your doctor or health care professional. Stopping your medicine suddenly can increase your seizures or their severity.  A special MedGuide will be given to you by the pharmacist with each prescription and refill. Be sure to read this information carefully each time.  Contact your pediatrician or health care professional regarding the use of this medication in children. While this drug may be prescribed for children as young as 4 years of age for selected conditions, precautions do apply.  Overdosage: If you think you have taken too much of this medicine contact a poison control center or emergency room at once.  NOTE: This medicine is only for you. Do not share this medicine with others.  What if I miss a dose?  If you miss a dose, take it as soon as you can. If it is almost time for your next dose, take only that dose. Do not take double or extra doses.  What may interact with this medicine?  This medicine may interact with the following medications:    carbamazepine    colesevelam    probenecid    sevelamer  This list may not describe all possible interactions. Give your health care provider a list of all the medicines, herbs, non-prescription drugs, or dietary supplements you use. Also tell them if you smoke, drink alcohol, or use illegal drugs. Some items may interact with your medicine.  What should I watch for while using this medicine?  Visit your doctor or health care professional for a regular check on your progress. Wear a medical identification bracelet or chain to say you have epilepsy, and carry a card that lists all your medications.  It is important to take this medicine exactly as instructed by your health care professional. When first starting treatment, your dose may need to be adjusted. It may take weeks or months before your dose is stable. You should contact your doctor or health care professional if your seizures get worse or if you have any new types of seizures.  You may get  drowsy or dizzy. Do not drive, use machinery, or do anything that needs mental alertness until you know how this medicine affects you. Do not stand or sit up quickly, especially if you are an older patient. This reduces the risk of dizzy or fainting spells. Alcohol may interfere with the effect of this medicine. Avoid alcoholic drinks.  The use of this medicine may increase the chance of suicidal thoughts or actions. Pay special attention to how you are responding while on this medicine. Any worsening of mood, or thoughts of suicide or dying should be reported to your health care professional right away.  Women who become pregnant while using this medicine may enroll in the [place] Antiepileptic Drug Pregnancy Registry by calling 1-934.441.1265. This registry collects information about the safety of antiepileptic drug use during pregnancy.  What side effects may I notice from receiving this medicine?  Side effects you should report to your doctor or health care professional as soon as possible:    allergic reactions like skin rash, itching or hives, swelling of the face, lips, or tongue    breathing problems    dark urine    general ill feeling or flu-like symptoms    problems with balance, talking, walking    unusually weak or tired    worsening of mood, thoughts or actions of suicide or dying    yellowing of the eyes or skin  Side effects that usually do not require medical attention (report to your doctor or health care professional if they continue or are bothersome):    diarrhea    dizzy, drowsy    headache    loss of appetite  This list may not describe all possible side effects. Call your doctor for medical advice about side effects. You may report side effects to FDA at 4-998-OIH-0625.  Where should I keep my medicine?  Keep out of reach of children.  Store at room temperature between 15 and 30 degrees C (59 and 86 degrees F). Throw away any unused medicine after the expiration date.  NOTE:This sheet  is a summary. It may not cover all possible information. If you have questions about this medicine, talk to your doctor, pharmacist, or health care provider. Copyright  2016 Gold Standard                Patient Education    Prednisone Gastro-resistant tablet    Prednisone Oral solution    Prednisone Oral tablet  Prednisone Oral tablet  What is this medicine?  PREDNISONE (PRED ni sone) is a corticosteroid. It is commonly used to treat inflammation of the skin, joints, lungs, and other organs. Common conditions treated include asthma, allergies, and arthritis. It is also used for other conditions, such as blood disorders and diseases of the adrenal glands.  This medicine may be used for other purposes; ask your health care provider or pharmacist if you have questions.  What should I tell my health care provider before I take this medicine?  They need to know if you have any of these conditions:    Cushing's syndrome    diabetes    glaucoma    heart disease    high blood pressure    infection (especially a virus infection such as chickenpox, cold sores, or herpes)    kidney disease    liver disease    mental illness    myasthenia gravis    osteoporosis    seizures    stomach or intestine problems    thyroid disease    an unusual or allergic reaction to lactose, prednisone, other medicines, foods, dyes, or preservatives    pregnant or trying to get pregnant    breast-feeding  How should I use this medicine?  Take this medicine by mouth with a glass of water. Follow the directions on the prescription label. Take this medicine with food. If you are taking this medicine once a day, take it in the morning. Do not take more medicine than you are told to take. Do not suddenly stop taking your medicine because you may develop a severe reaction. Your doctor will tell you how much medicine to take. If your doctor wants you to stop the medicine, the dose may be slowly lowered over time to avoid any side effects.  Talk to your  pediatrician regarding the use of this medicine in children. Special care may be needed.  Overdosage: If you think you have taken too much of this medicine contact a poison control center or emergency room at once.  NOTE: This medicine is only for you. Do not share this medicine with others.  What if I miss a dose?  If you miss a dose, take it as soon as you can. If it is almost time for your next dose, talk to your doctor or health care professional. You may need to miss a dose or take an extra dose. Do not take double or extra doses without advice.  What may interact with this medicine?  Do not take this medicine with any of the following medications:    metyrapone    mifepristone  This medicine may also interact with the following medications:    aminoglutethimide    amphotericin B    aspirin and aspirin-like medicines    barbiturates    certain medicines for diabetes, like glipizide or glyburide    cholestyramine    cholinesterase inhibitors    cyclosporine    digoxin    diuretics    ephedrine    female hormones, like estrogens and birth control pills    isoniazid    ketoconazole    NSAIDS, medicines for pain and inflammation, like ibuprofen or naproxen    phenytoin    rifampin    toxoids    vaccines    warfarin  This list may not describe all possible interactions. Give your health care provider a list of all the medicines, herbs, non-prescription drugs, or dietary supplements you use. Also tell them if you smoke, drink alcohol, or use illegal drugs. Some items may interact with your medicine.  What should I watch for while using this medicine?  Visit your doctor or health care professional for regular checks on your progress. If you are taking this medicine over a prolonged period, carry an identification card with your name and address, the type and dose of your medicine, and your doctor's name and address.  This medicine may increase your risk of getting an infection. Tell your doctor or health care  professional if you are around anyone with measles or chickenpox, or if you develop sores or blisters that do not heal properly.  If you are going to have surgery, tell your doctor or health care professional that you have taken this medicine within the last twelve months.  Ask your doctor or health care professional about your diet. You may need to lower the amount of salt you eat.  This medicine may affect blood sugar levels. If you have diabetes, check with your doctor or health care professional before you change your diet or the dose of your diabetic medicine.  What side effects may I notice from receiving this medicine?  Side effects that you should report to your doctor or health care professional as soon as possible:    allergic reactions like skin rash, itching or hives, swelling of the face, lips, or tongue    changes in emotions or moods    changes in vision    depressed mood    eye pain    fever or chills, cough, sore throat, pain or difficulty passing urine    increased thirst    swelling of ankles, feet  Side effects that usually do not require medical attention (report to your doctor or health care professional if they continue or are bothersome):    confusion, excitement, restlessness    headache    nausea, vomiting    skin problems, acne, thin and shiny skin    trouble sleeping    weight gain  This list may not describe all possible side effects. Call your doctor for medical advice about side effects. You may report side effects to FDA at 6-855-FDA-7321.  Where should I keep my medicine?  Keep out of the reach of children.  Store at room temperature between 15 and 30 degrees C (59 and 86 degrees F). Protect from light. Keep container tightly closed. Throw away any unused medicine after the expiration date.  NOTE:This sheet is a summary. It may not cover all possible information. If you have questions about this medicine, talk to your doctor, pharmacist, or health care provider. Copyright  2016 Gold  Standard

## 2017-04-01 NOTE — IP AVS SNAPSHOT
[facility]    [address] [address]    Phone:  [phone]    Fax:  [phone]                                       After Visit Summary   4/1/2017    Doretha Fernandez    MRN: [id number]           After Visit Summary Signature Page     I have received my discharge instructions, and my questions have been answered. I have discussed any challenges I see with this plan with the nurse or doctor.    ..........................................................................................................................................  Patient/Patient Representative Signature      ..........................................................................................................................................  Patient Representative Print Name and Relationship to Patient    ..................................................               ................................................  Date                                            Time    ..........................................................................................................................................  Reviewed by Signature/Title    ...................................................              ..............................................  Date                                                            Time

## 2017-04-01 NOTE — TELEPHONE ENCOUNTER
"Call Type: Triage Call    Presenting Problem: \"Left eye sight became blurry, and a headache.\"  Triage Note:  Guideline Title: Headache  Recommended Disposition: See ED Immediately  Original Inclination: Wanted to speak with a nurse  Override Disposition:  Intended Action: Follow advice given  Physician Contacted: No  Sudden loss or change in vision (double or blurred vision, increased light  sensitivity, partial loss of visual field) AND not previously evaluated ?  YES  Follows head trauma ? NO  Unconscious now ? NO  Smoke/carbon monoxide exposure ? NO  New seizure now or within last 6 hours ? NO  Sudden, severe disabling head pain OR caller spontaneously verbalizes \"worst  headache of my life\" ? NO  Sudden change in mental status or new change in speech ? NO  High to low (but not zero) risk of exposure to Ebola within the past 21 days ? NO  New numbness, weakness or paralysis involving face, arm or leg, especially on same  side of body, loss of balance or coordination, confusion or trouble speaking  occurring now or within last 8 hours ? NO  Physician Instructions:  Care Advice:  "

## 2017-04-01 NOTE — PLAN OF CARE
Problem: Goal Outcome Summary  Goal: Goal Outcome Summary  Left eye pressure, pain, visual changes will be resolved by discharge.

## 2017-04-01 NOTE — ED NOTES
Medication given for headache  Patient alert oriented  States eyes have improved some but not very much

## 2017-04-02 PROCEDURE — 25000128 H RX IP 250 OP 636: Performed by: FAMILY MEDICINE

## 2017-04-02 PROCEDURE — 99207 ZZC CDG-CHARGE/DX NOT SELECTED BY PROVIDER: CPT | Performed by: FAMILY MEDICINE

## 2017-04-02 PROCEDURE — 25000125 ZZHC RX 250: Performed by: FAMILY MEDICINE

## 2017-04-02 PROCEDURE — 99232 SBSQ HOSP IP/OBS MODERATE 35: CPT | Performed by: FAMILY MEDICINE

## 2017-04-02 PROCEDURE — 12000007 ZZH R&B INTERMEDIATE

## 2017-04-02 PROCEDURE — 25000132 ZZH RX MED GY IP 250 OP 250 PS 637: Performed by: FAMILY MEDICINE

## 2017-04-02 RX ORDER — CYCLOBENZAPRINE HCL 10 MG
10 TABLET ORAL 3 TIMES DAILY PRN
Status: DISCONTINUED | OUTPATIENT
Start: 2017-04-02 | End: 2017-04-05 | Stop reason: HOSPADM

## 2017-04-02 RX ORDER — KETOROLAC TROMETHAMINE 30 MG/ML
30 INJECTION, SOLUTION INTRAMUSCULAR; INTRAVENOUS EVERY 6 HOURS PRN
Status: DISCONTINUED | OUTPATIENT
Start: 2017-04-02 | End: 2017-04-04

## 2017-04-02 RX ORDER — ACETAMINOPHEN 500 MG
1000 TABLET ORAL EVERY 8 HOURS PRN
Status: DISCONTINUED | OUTPATIENT
Start: 2017-04-02 | End: 2017-04-05 | Stop reason: HOSPADM

## 2017-04-02 RX ORDER — SUMATRIPTAN 50 MG/1
50 TABLET, FILM COATED ORAL
Status: DISPENSED | OUTPATIENT
Start: 2017-04-02 | End: 2017-04-03

## 2017-04-02 RX ORDER — HYDROXYZINE HYDROCHLORIDE 25 MG/1
25 TABLET, FILM COATED ORAL ONCE
Status: COMPLETED | OUTPATIENT
Start: 2017-04-02 | End: 2017-04-02

## 2017-04-02 RX ORDER — PREDNISONE 20 MG/1
40 TABLET ORAL DAILY
Status: DISCONTINUED | OUTPATIENT
Start: 2017-04-02 | End: 2017-04-05 | Stop reason: HOSPADM

## 2017-04-02 RX ADMIN — DICLOFENAC 2 G: 10 GEL TOPICAL at 19:19

## 2017-04-02 RX ADMIN — ACETAMINOPHEN 1000 MG: 500 TABLET ORAL at 15:13

## 2017-04-02 RX ADMIN — PREDNISONE 40 MG: 20 TABLET ORAL at 15:13

## 2017-04-02 RX ADMIN — SUMATRIPTAN SUCCINATE 50 MG: 50 TABLET ORAL at 22:25

## 2017-04-02 RX ADMIN — FLUOXETINE 40 MG: 20 CAPSULE ORAL at 07:53

## 2017-04-02 RX ADMIN — DICLOFENAC 2 G: 10 GEL TOPICAL at 22:24

## 2017-04-02 RX ADMIN — DEXTROSE AND SODIUM CHLORIDE: 5; 900 INJECTION, SOLUTION INTRAVENOUS at 15:09

## 2017-04-02 RX ADMIN — LEVETIRACETAM 250 MG: 250 TABLET, FILM COATED ORAL at 19:23

## 2017-04-02 RX ADMIN — HYDROCODONE BITARTRATE AND ACETAMINOPHEN 1 TABLET: 5; 325 TABLET ORAL at 07:53

## 2017-04-02 RX ADMIN — LEVETIRACETAM 250 MG: 250 TABLET, FILM COATED ORAL at 07:53

## 2017-04-02 RX ADMIN — KETOROLAC TROMETHAMINE 30 MG: 30 INJECTION, SOLUTION INTRAMUSCULAR at 15:09

## 2017-04-02 RX ADMIN — HYDROXYZINE HYDROCHLORIDE 25 MG: 25 TABLET ORAL at 11:52

## 2017-04-02 RX ADMIN — DEXTROSE AND SODIUM CHLORIDE: 5; 900 INJECTION, SOLUTION INTRAVENOUS at 05:52

## 2017-04-02 RX ADMIN — HYDROCODONE BITARTRATE AND ACETAMINOPHEN 1 TABLET: 5; 325 TABLET ORAL at 10:37

## 2017-04-02 NOTE — H&P
"[facility] Progress Note           Assessment and Plan:     Doretha Fernandez is a 42 year old white female with significant past medical history of prothrombin deficiency, Right parietal lobe stroke or TIA during labor 18 years ago who presented to the ED 2016 with blurry vision in left eye, pressure in left face, left sided headache. Of note, she has a chronic history of headaches, visual disturbance, word finding problem for a long time and numbness.    Principal Problem:  Vision changes  Acute non intractable tension-type headache  Assessment: left eye blurry, left face pressure, left headache. Yesterday left arm and leg numb, has resolved. She has history of stroke 18 years ago during labor, was difficult labor resulting in  and left sided weakness and vision changes, workup showed prothrombin deficiency and Right Parietal lobe stroke. Second pregnancy she was in ICU with bleeding. Stopped ASA about a month ago when had mono and has not restarted it. Received two aspirin in ED. Stroke unit consulted by ED provider and they reviewed MRI, sxs and suspect this it \"partial focal seizure\" rather than ischemic event. They recommended starting Keppra, EEG and see neurology next week. She has sty in the left eye, upper lid and stopped drops about a week ago. Sty still present. MRI brain shows stable area of abnormal T2 signal at the posterior pole of right parietal lobe that is unchanged from comparison study and differential includes migrational abnormality, previous ischemic or traumatic insult or low-grade tumor. No evidence for acute intracranial pathology.   Plan: start Keppra, if sxs resolve this would verify partial seizure activity. EEG and neurology out patient. IVF, pain and nausea order sets.  2017- patient today confirms that the headache and dizziness have been on the past 2 days. Started with dizziness and then the headache in the left suboccipital region radiating to " the whole of the left side of her headache- no preceding trauma and nothing else that she can attribute to triggering this. Sounds more as this could be a tension type syndrome. Will see if this can be aborted with Tylenol, Ketorolac 30mg, Flexeril, Prednisone, Imitrex. If not significant better, will have ophthalmologist see in am. Continue Keppra for now but if her symptoms get better with above regimen, may be able to discontinue.     Prothrombin deficiency (H)  Assessment: has been off aspirin  Plan: restart Asprin later.     Disposition: Anticipate will be hospitalized 1-2 days d  Code Status: CPR  DVT Prophylaxis: ambulate, prothrombin deficiency         Interval History:   Patient reports that she has ongoing left occipital and left sided cynthia cranial headache, no associated light sensitivity (though she felt more sensitive in this eye than the right when light was directly shone into it), no nausea, vomiting, no light sensitivity.              Review of Systems:   No fevers, chills  No chest pain, palpitations, headaches, dyspnea on exertion, feet swelling, orthopnea or PND  No shortness of breath, cough  No nausea, vomiting, constipation, diarrhea or abdominal pain  No urinary pain, change in color, frequency or nocturia                Exam:     Intake/Output Summary (Last 24 hours) at 04/02/17 0811  Last data filed at 04/02/17 0551   Gross per 24 hour   Intake          1569.33 ml   Output                0 ml   Net          1569.33 ml     Vitals:    04/02/17 0729 04/02/17 1059 04/02/17 1127 04/02/17 1146   BP: 104/64 102/66 106/71 124/63   BP Location: Right arm Right arm Right arm    Pulse: 78 65 67    Resp: 18 18 18    Temp: 98.1  F (36.7  C)  97.9  F (36.6  C)    TempSrc: Oral  Oral    SpO2: 100% 100% 98%    Weight:       Height:           General - Awake and alert, not in any obvious distress. Afebrile, not pale, well hydrated.   Head - Normocephalic, atraumatic.  Eyes - Pupils are equal, round and  reactive to light bilaterally.  Extraocular movements are intact bilaterally. Sclera and conjunctiva clear. Lids without lesions  Lungs - Clear to auscultation bilaterally, no wheezes, rales or rhonchi.  CV - Regular rate and rhythm, no murmurs, rubs or gallops.  Neck- Tenderness over left suboccipital/ trapezius region.  Neurologic - Cranial nerves 2-12 intact. Muscle tone, bulk and strength within normal limits throughout.  Psych - Judgment and mental status are clear, patient has reasonable insight. Mood is stable.             Medications:     Prescriptions Prior to Admission   Medication Sig Dispense Refill Last Dose     calcium carbonate (TUMS) 500 MG chewable tablet Take 1 chew tab by mouth daily as needed for heartburn   Past Week at Unknown time     FLUoxetine (PROZAC) 20 MG capsule Take 40 mg by mouth daily 12/6/16 -- reduced to 40 mg daily 270 capsule 01 4/1/2017 at am     Acetaminophen (TYLENOL PO) Take 1,000 mg by mouth every 6 hours as needed for mild pain or fever   3/31/2017 at Unknown time     fluticasone (FLONASE) 50 MCG/ACT nasal spray Spray 2 sprays into both nostrils daily 1 g 3 Past Week at Unknown time     amoxicillin-clavulanate (AUGMENTIN) 875-125 MG per tablet Take 1 tablet by mouth 2 times daily 20 tablet 0 not started     LORazepam (ATIVAN) 1 MG tablet Take 1 tablet (1 mg) by mouth every 8 hours as needed for anxiety 30 tablet 0 More than a month at Unknown time       Current Facility-Administered Medications   Medication     naloxone (NARCAN) injection 0.1-0.4 mg     levETIRAcetam (KEPPRA) tablet 250 mg     acetaminophen (TYLENOL) tablet 1,000 mg     FLUoxetine (PROzac) capsule 40 mg     naloxone (NARCAN) injection 0.1-0.4 mg     dextrose 5% and 0.9% NaCl infusion     HYDROcodone-acetaminophen (NORCO) 5-325 MG per tablet 1-2 tablet     ondansetron (ZOFRAN-ODT) ODT tab 4 mg    Or     ondansetron (ZOFRAN) injection 4 mg                 Blood work/Imaging:        Results for orders placed or  performed during the hospital encounter of 04/01/17   MR Brain w/o & w Contrast    Narrative    MRI OF THE BRAIN WITHOUT AND WITH CONTRAST 4/1/2017 3:10 PM     COMPARISON: Brain MRI 12/1/2016.    HISTORY:  Waxing and waning left visual field change, tingling left  arm and leg, history of stroke.     TECHNIQUE: Multi-sequence, multi-planar MRI images of the brain were  acquired before and after the administration of IV gadolinium (9 mL  Gadavist).    FINDINGS: Abnormal T2 signal hyperintensity in the cortex and  subcortical white matter of the posterior tip of the right parietal  lobe is again noted without definite change from the comparison study.  Differential diagnosis is unchanged including migrational abnormality,  previous ischemic or traumatic insult and low-grade tumor. Gray-white  differentiation of the brain is otherwise normal.    The ventricles and basal cisterns are normal in configuration. There  is no midline shift. There are no extra-axial fluid collections. There  is no evidence for stroke or acute intracranial hemorrhage. There is  no abnormal contrast enhancement in the brain or its coverings.    There is no sinusitis or mastoiditis.      Impression    IMPRESSION:  1. Stable area of abnormal T2 signal hyperintensity involving the  cortex and subcortical white matter at the posterior pole of the right  parietal lobe with differential as above. Seizure activity arising  from this area of abnormal signal could be responsible for the  patient's symptoms.  2. Otherwise, normal brain MRI. No evidence for acute intracranial  pathology.     SONA AC MD   CBC with platelets, differential   Result Value Ref Range    WBC 6.6 4.0 - 11.0 10e9/L    RBC Count 4.28 3.8 - 5.2 10e12/L    Hemoglobin 12.1 11.7 - 15.7 g/dL    Hematocrit 36.7 35.0 - 47.0 %    MCV 86 78 - 100 fl    MCH 28.3 26.5 - 33.0 pg    MCHC 33.0 31.5 - 36.5 g/dL    RDW 12.5 10.0 - 15.0 %    Platelet Count 247 150 - 450 10e9/L    Diff Method  Automated Method     % Neutrophils 55.4 %    % Lymphocytes 37.0 %    % Monocytes 6.0 %    % Eosinophils 0.8 %    % Basophils 0.3 %    % Immature Granulocytes 0.5 %    Absolute Neutrophil 3.7 1.6 - 8.3 10e9/L    Absolute Lymphocytes 2.5 0.8 - 5.3 10e9/L    Absolute Monocytes 0.4 0.0 - 1.3 10e9/L    Absolute Eosinophils 0.1 0.0 - 0.7 10e9/L    Absolute Basophils 0.0 0.0 - 0.2 10e9/L    Abs Immature Granulocytes 0.0 0 - 0.4 10e9/L   Basic metabolic panel   Result Value Ref Range    Sodium 138 133 - 144 mmol/L    Potassium 3.8 3.4 - 5.3 mmol/L    Chloride 104 94 - 109 mmol/L    Carbon Dioxide 27 20 - 32 mmol/L    Anion Gap 7 3 - 14 mmol/L    Glucose 84 70 - 99 mg/dL    Urea Nitrogen 14 7 - 30 mg/dL    Creatinine 0.66 0.52 - 1.04 mg/dL    GFR Estimate >90  Non  GFR Calc   >60 mL/min/1.7m2    GFR Estimate If Black >90   GFR Calc   >60 mL/min/1.7m2    Calcium 9.0 8.5 - 10.1 mg/dL   INR   Result Value Ref Range    INR 0.92 0.86 - 1.14   Erythrocyte sedimentation rate auto   Result Value Ref Range    Sed Rate 16 0 - 20 mm/h   CRP Inflammation   Result Value Ref Range    CRP Inflammation 4.4 0.0 - 8.0 mg/L   Partial thromboplastin time   Result Value Ref Range    PTT 27 22 - 37 sec           Attestation:   I have reviewed today's vital signs, notes, medications, labs and imaging.   Amount of time spent on this daily note: 25 minutes.   Aurora Rodrigues MD

## 2017-04-02 NOTE — H&P
"[facility] HISTORY & PHYSICAL  Home Clinic:   [facility]  Primary Provider:   Evangelista  Date of admission:   2017    ASSESSMENT AND PLAN:    Principal Problem:    Vision changes    Acute non intractable tension-type headache    Assessment: left eye blurry, left face pressure, left headache.  Yesterday left arm and leg numb, has resolved.  She has history of stroke 18 years ago during labor, was difficult labor resulting in  and left sided weakness and vision changes, workup showed prothrombin deficiency and Right Parietal lobe stroke.  Second pregnancy she  was in ICU with bleeding.  Stopped ASA about a month ago when had mono and has not restarted it.  Received two aspirin in ED.  Stroke unit consulted by ED provider and they reviewed MRI, sxs and suspect this it \"partial focal seizure\" rather than ischemic event.  They recommended starting Keppra, EEG and see neurology next week.  She has sty in the left eye, upper lid and stopped drops about a week ago.  Sty still present.  MRI brain shows stable area of abnormal T2 signal at the posterior pole of right parietal lobe that is unchanged from comparison study and differential includes migrational abnormality, previous ischemic or traumatic insult or low-grade tumor.  No evidence for acute intracranial pathology.      Plan: start Keppra, if sxs resolve this would verify partial seizure activity.  EEG and neurology out patient.  IVF, pain and nausea order sets.      Prothrombin deficiency (H)    Assessment: has been off aspirin    Plan: restart Asprin.    Disposition: Anticipate will be hospitalized 2 days.   Code Status: CPR  DVT Prophylaxis: ambulate, prothrombin deficiency      CHIEF COMPLAINT:  Left eye blurry vision, headache left side  History Obtained From:  patient, electronic health record and emergency department physician    HISTORY OF PRESENT ILLNESS:    Doretha Fernandez is a 42 year old white female with significant past medical history of " prothrombin deficiency,  Right parietal lobe stroke or TIA during labor 18 years ago who presents with blurry vision in left eye, pressure in left face, left sided headache.  Yesterday her left arm and leg were numb but that has resolved.  She stopped ASA about a month ago because she had mono.  She saw Dr. Naidu for URI and sty in left eye last week and was started on eye drops which she has stopped.  She was also prescribed Augmentin but has not taken it.    REVIEW OF SYSTEMS:  CONSTITUTIONAL:  positive for  fatigue  negative for  fevers, chills, sweats, malaise, anorexia and weight loss  HEENT:  positive for  sore throat, hoarseness and tonsillitis 2 weeks ago  negative for  tinnitus, earaches and snoring  RESPIRATORY: negative for  dry cough, cough with sputum, dyspnea, wheezing, hemoptysis, chest pain, pleuritic pain and cyanosis  CARDIOVASCULAR: negative for  chest pain, dyspnea, palpitations, orthopnea, PND, exertional chest pressure/discomfort, fatigue, early saiety, edema, syncope  GASTROINTESTINAL:  negative for nausea, vomiting, change in bowel habits, diarrhea and constipation  GENITOURINARY:  negative for frequency, dysuria, nocturia, urinary incontinence, hesitancy and decreased stream  MUSCULOSKELETAL:  negative for  myalgias, arthralgias, pain, joint swelling, stiff joints, decreased range of motion, muscle weakness and bone pain  NEUROLOGICAL:  positive for headaches, visual disturbance, word finding problem for a long time and numbness  negative for dizziness, seizures, memory problems, coordination problems, gait problems, tremor, dysphagia, weakness, syncope, near syncope and tingling  BEHAVIOR/PSYCH:  positive for anxiety and negative for poor appetite, increased appetite, decreased sleep, increased sleep, decreased energy level, increased energy level, poor concentration, depressed mood, elated mood and agitated    PAST MEDICAL HISTORY:    Medications Prior to Admission:    Prescriptions  Prior to Admission   Medication Sig Dispense Refill Last Dose     calcium carbonate (TUMS) 500 MG chewable tablet Take 1 chew tab by mouth daily as needed for heartburn   Past Week at Unknown time     FLUoxetine (PROZAC) 20 MG capsule Take 40 mg by mouth daily 12/6/16 -- reduced to 40 mg daily 270 capsule 01 4/1/2017 at am     Acetaminophen (TYLENOL PO) Take 1,000 mg by mouth every 6 hours as needed for mild pain or fever   3/31/2017 at Unknown time     fluticasone (FLONASE) 50 MCG/ACT nasal spray Spray 2 sprays into both nostrils daily 1 g 3 Past Week at Unknown time     amoxicillin-clavulanate (AUGMENTIN) 875-125 MG per tablet Take 1 tablet by mouth 2 times daily 20 tablet 0 not started     LORazepam (ATIVAN) 1 MG tablet Take 1 tablet (1 mg) by mouth every 8 hours as needed for anxiety 30 tablet 0 More than a month at Unknown time       Allergies:    Allergies   Allergen Reactions     Erythrocin Nausea and Vomiting     Zithromax [Azithromycin Dihydrate] Diarrhea     Patient Active Problem List   Diagnosis     CARDIOVASCULAR SCREENING; LDL GOAL LESS THAN 160     DUB (dysfunctional uterine bleeding)     Anxiety state     Esophageal reflux     Mild major depression (H)     Prothrombin deficiency (H)     Mild intermittent asthma without complication     Vision changes     Acute non intractable tension-type headache     Past Medical History:   Diagnosis Date     Abnormal MRI     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      Lumbago     left lower back pain     Prothrombin deficiency (H)     takes 81mg asa daily     Stroke (cerebrum) (H) 2000    during labor, difficult      TIA (transient ischemic attack) 2004     Past Surgical History:    History reviewed. No pertinent surgical history.    Social History:   Social History     Social History     Marital status:      Spouse name: N/A     Number of children: N/A     Years of education: N/A     Social History Main Topics     Smoking status: Passive  Smoke Exposure - Never Smoker     Last attempt to quit: 3/20/1998     Smokeless tobacco: Never Used     Alcohol use Yes      Comment: occ     Drug use: No     Sexual activity: Yes     Partners: Male     Birth control/ protection: Surgical     Other Topics Concern     Parent/Sibling W/ Cabg, Mi Or Angioplasty Before 65f 55m? No     Social History Narrative    19 y.o- patient's mother   of lung cancer. She had to take care of her younger sister.    2012- patient's  had a heart attack with stents placed, followed by cardiac rehabilitation    2000 TO 2012  was in [facility] for depression    2013 patient's  went through alcohol rehabilitation at [facility]            They have attended couple counseling a couple of times and patient went to the family program for chemical dependency.    Patient denies alcohol or drug use and herself            Has 2 children, girls ages 5 and 8    [place] [employer] and also works for [employer]. 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             Family History:   Family History   Problem Relation Age of Onset     CANCER Mother 45     lung     Neurologic Disorder Mother      Lipids Father      GASTROINTESTINAL DISEASE Father      Depression Father      CANCER Maternal Grandmother      Blood Disease Maternal Grandmother      Arthritis Maternal Grandmother      DIABETES Maternal Grandmother      Depression Maternal Grandmother      DIABETES Maternal Grandfather      CEREBROVASCULAR DISEASE Maternal Grandfather      Blood Disease Maternal Grandfather      HEART DISEASE Maternal Grandfather      CANCER Paternal Grandmother      Cancer - colorectal Paternal Grandmother      Respiratory Paternal Grandfather      Blood Disease Paternal Grandfather      HEART DISEASE Daughter      Asthma Daughter      Depression Sister      Family Status   Relation Status     Mother  at age 45  "    Father Alive     Maternal Grandmother Alive     Maternal Grandfather Alive     Paternal Grandmother Alive     Paternal Grandfather  at age 67     Daughter Alive     Sister Alive     Daughter Alive         PHYSICAL EXAM:  Vitals:   /78 (BP Location: Right arm)  Pulse 64  Temp 98.4  F (36.9  C) (Oral)  Resp 18  Ht 1.6 m (5' 3\")  Wt 93.5 kg (206 lb 2.1 oz)  LMP 2017  SpO2 100%  BMI 36.51 kg/m2   GENERAL APPEARANCE ADULT: Alert, no acute distress, overweight, no distress, anxious, cooperative, tired appearing  EYES: PERRL, EOM normal, conjunctiva and lids normal, eyelids- chalazion left upper lid  HENT: Ears and TMs normal, oral mucosa and posterior oropharynx normal, tongue dry and pasty  NECK: No adenopathy,masses or thyromegaly  RESP: lungs clear to auscultation   CV: normal rate, regular rhythm, no murmur or gallop  ABDOMEN: soft, no organomegaly, masses or tenderness  MS: extremities normal, no peripheral edema  SKIN: no suspicious lesions or rashes  NEURO: Alert, oriented, speech and mentation normal, Cranial nerves 2-12 are normal., Strength normal and symmetrical in upper and lower extremities., Reflexes 2+ and symmetrical at biceps, triceps, brachioradialis, knees and ankles, speech clear and conscice  PSYCH: mentation appears normal., anxious    ECG:   Normal Sinus Rhythm, rate 68, normal axis, normal intervals, no acute ST/T changes c/w ischemia, unchanged from previous tracings    LABS AND IMAGING:     Results for orders placed or performed during the hospital encounter of 17   MR Brain w/o & w Contrast    Narrative    MRI OF THE BRAIN WITHOUT AND WITH CONTRAST 2017 3:10 PM     COMPARISON: Brain MRI 2016.    HISTORY:  Waxing and waning left visual field change, tingling left  arm and leg, history of stroke.     TECHNIQUE: Multi-sequence, multi-planar MRI images of the brain were  acquired before and after the administration of IV gadolinium (9 " mL  Gadavist).    FINDINGS: Abnormal T2 signal hyperintensity in the cortex and  subcortical white matter of the posterior tip of the right parietal  lobe is again noted without definite change from the comparison study.  Differential diagnosis is unchanged including migrational abnormality,  previous ischemic or traumatic insult and low-grade tumor. Gray-white  differentiation of the brain is otherwise normal.    The ventricles and basal cisterns are normal in configuration. There  is no midline shift. There are no extra-axial fluid collections. There  is no evidence for stroke or acute intracranial hemorrhage. There is  no abnormal contrast enhancement in the brain or its coverings.    There is no sinusitis or mastoiditis.      Impression    IMPRESSION:  1. Stable area of abnormal T2 signal hyperintensity involving the  cortex and subcortical white matter at the posterior pole of the right  parietal lobe with differential as above. Seizure activity arising  from this area of abnormal signal could be responsible for the  patient's symptoms.  2. Otherwise, normal brain MRI. No evidence for acute intracranial  pathology.    CBC with platelets, differential   Result Value Ref Range    WBC 6.6 4.0 - 11.0 10e9/L    RBC Count 4.28 3.8 - 5.2 10e12/L    Hemoglobin 12.1 11.7 - 15.7 g/dL    Hematocrit 36.7 35.0 - 47.0 %    MCV 86 78 - 100 fl    MCH 28.3 26.5 - 33.0 pg    MCHC 33.0 31.5 - 36.5 g/dL    RDW 12.5 10.0 - 15.0 %    Platelet Count 247 150 - 450 10e9/L    Diff Method Automated Method     % Neutrophils 55.4 %    % Lymphocytes 37.0 %    % Monocytes 6.0 %    % Eosinophils 0.8 %    % Basophils 0.3 %    % Immature Granulocytes 0.5 %    Absolute Neutrophil 3.7 1.6 - 8.3 10e9/L    Absolute Lymphocytes 2.5 0.8 - 5.3 10e9/L    Absolute Monocytes 0.4 0.0 - 1.3 10e9/L    Absolute Eosinophils 0.1 0.0 - 0.7 10e9/L    Absolute Basophils 0.0 0.0 - 0.2 10e9/L    Abs Immature Granulocytes 0.0 0 - 0.4 10e9/L   Basic metabolic panel    Result Value Ref Range    Sodium 138 133 - 144 mmol/L    Potassium 3.8 3.4 - 5.3 mmol/L    Chloride 104 94 - 109 mmol/L    Carbon Dioxide 27 20 - 32 mmol/L    Anion Gap 7 3 - 14 mmol/L    Glucose 84 70 - 99 mg/dL    Urea Nitrogen 14 7 - 30 mg/dL    Creatinine 0.66 0.52 - 1.04 mg/dL    GFR Estimate >90  Non  GFR Calc   >60 mL/min/1.7m2    GFR Estimate If Black >90   GFR Calc   >60 mL/min/1.7m2    Calcium 9.0 8.5 - 10.1 mg/dL   INR   Result Value Ref Range    INR 0.92 0.86 - 1.14   Erythrocyte sedimentation rate auto   Result Value Ref Range    Sed Rate 16 0 - 20 mm/h   CRP Inflammation   Result Value Ref Range    CRP Inflammation 4.4 0.0 - 8.0 mg/L   Partial thromboplastin time   Result Value Ref Range    PTT 27 22 - 37 sec       ATTESTATION:  I have reviewed today's vital signs, notes, medications, labs and imaging.  Amount of time performed on this history and physical: 70 minutes.    ALVIN ALTAMIRANO MD

## 2017-04-02 NOTE — PLAN OF CARE
"Problem: Goal Outcome Summary  Goal: Goal Outcome Summary  Outcome: No Change  Patient reports feeling \"tingly all over\". /63, ME 83. Patient is reassured. Updated Dr. Rodrigues and received order for 25 mg vistaril.       "

## 2017-04-02 NOTE — PROGRESS NOTES
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 Of note, she has a chronic history of headaches, visual disturbance, word finding problem for a long time and numbness.    Principal Problem:  Vision changes  Acute non intractable tension-type headache  Assessment: left eye blurry, left face pressure, left headache. Yesterday left arm and leg numb, has resolved. She has history of stroke 18 years ago during labor, was difficult labor resulting in  and left sided weakness and vision changes, workup showed prothrombin deficiency and Right Parietal lobe stroke. Second pregnancy she was in ICU with bleeding. Stopped ASA about a month ago when had mono and has not restarted it. Received two aspirin in ED. Stroke unit consulted by ED provider and they reviewed MRI, sxs and suspect this it \"partial focal seizure\" rather than ischemic event. They recommended starting Keppra, EEG and see neurology next week. She has sty in the left eye, upper lid and stopped drops about a week ago. Sty still present. MRI brain shows stable area of abnormal T2 signal at the posterior pole of right parietal lobe that is unchanged from comparison study and differential includes migrational abnormality, previous ischemic or traumatic insult or low-grade tumor. No evidence for acute intracranial pathology.   Plan: start Keppra, if sxs resolve this would verify partial seizure activity. EEG and neurology out patient. IVF, pain and nausea order sets.  2017- patient today confirms that the headache and dizziness have been on the past 2 days. Started with dizziness and then the headache in the left suboccipital region radiating to " the whole of the left side of her headache- no preceding trauma and nothing else that she can attribute to triggering this. Sounds more as this could be a tension type syndrome. Will see if this can be aborted with Tylenol, Ketorolac 30mg, Flexeril, Prednisone, Imitrex. If not significant better, will have ophthalmologist see in am. Continue Keppra for now but if her symptoms get better with above regimen, may be able to discontinue.     Prothrombin deficiency (H)  Assessment: has been off aspirin  Plan: restart Asprin later.     Disposition: Anticipate will be hospitalized 1-2 days d  Code Status: CPR  DVT Prophylaxis: ambulate, prothrombin deficiency         Interval History:   Patient reports that she has ongoing left occipital and left sided cynthia cranial headache, no associated light sensitivity (though she felt more sensitive in this eye than the right when light was directly shone into it), no nausea, vomiting, no light sensitivity.              Review of Systems:   No fevers, chills  No chest pain, palpitations, headaches, dyspnea on exertion, feet swelling, orthopnea or PND  No shortness of breath, cough  No nausea, vomiting, constipation, diarrhea or abdominal pain  No urinary pain, change in color, frequency or nocturia                Exam:     Intake/Output Summary (Last 24 hours) at 04/02/17 0811  Last data filed at 04/02/17 0551   Gross per 24 hour   Intake          1569.33 ml   Output                0 ml   Net          1569.33 ml     Vitals:    04/02/17 0729 04/02/17 1059 04/02/17 1127 04/02/17 1146   BP: 104/64 102/66 106/71 124/63   BP Location: Right arm Right arm Right arm    Pulse: 78 65 67    Resp: 18 18 18    Temp: 98.1  F (36.7  C)  97.9  F (36.6  C)    TempSrc: Oral  Oral    SpO2: 100% 100% 98%    Weight:       Height:           General - Awake and alert, not in any obvious distress. Afebrile, not pale, well hydrated.   Head - Normocephalic, atraumatic.  Eyes - Pupils are equal, round and  reactive to light bilaterally.  Extraocular movements are intact bilaterally. Sclera and conjunctiva clear. Lids without lesions  Lungs - Clear to auscultation bilaterally, no wheezes, rales or rhonchi.  CV - Regular rate and rhythm, no murmurs, rubs or gallops.  Neck- Tenderness over left suboccipital/ trapezius region.  Neurologic - Cranial nerves 2-12 intact. Muscle tone, bulk and strength within normal limits throughout.  Psych - Judgment and mental status are clear, patient has reasonable insight. Mood is stable.             Medications:     Prescriptions Prior to Admission   Medication Sig Dispense Refill Last Dose     calcium carbonate (TUMS) 500 MG chewable tablet Take 1 chew tab by mouth daily as needed for heartburn   Past Week at Unknown time     FLUoxetine (PROZAC) 20 MG capsule Take 40 mg by mouth daily 12/6/16 -- reduced to 40 mg daily 270 capsule 01 4/1/2017 at am     Acetaminophen (TYLENOL PO) Take 1,000 mg by mouth every 6 hours as needed for mild pain or fever   3/31/2017 at Unknown time     fluticasone (FLONASE) 50 MCG/ACT nasal spray Spray 2 sprays into both nostrils daily 1 g 3 Past Week at Unknown time     amoxicillin-clavulanate (AUGMENTIN) 875-125 MG per tablet Take 1 tablet by mouth 2 times daily 20 tablet 0 not started     LORazepam (ATIVAN) 1 MG tablet Take 1 tablet (1 mg) by mouth every 8 hours as needed for anxiety 30 tablet 0 More than a month at Unknown time       Current Facility-Administered Medications   Medication     predniSONE (DELTASONE) tablet 40 mg     SUMAtriptan (IMITREX) tablet 50 mg     ketorolac (TORADOL) injection 30 mg     acetaminophen (TYLENOL) tablet 1,000 mg     diclofenac (VOLTAREN) 1 % topical gel 2 g     cyclobenzaprine (FLEXERIL) tablet 10 mg     naloxone (NARCAN) injection 0.1-0.4 mg     levETIRAcetam (KEPPRA) tablet 250 mg     FLUoxetine (PROzac) capsule 40 mg     naloxone (NARCAN) injection 0.1-0.4 mg     dextrose 5% and 0.9% NaCl infusion     ondansetron  (ZOFRAN-ODT) ODT tab 4 mg    Or     ondansetron (ZOFRAN) injection 4 mg                 Blood work/Imaging:        Results for orders placed or performed during the hospital encounter of 04/01/17   MR Brain w/o & w Contrast    Narrative    MRI OF THE BRAIN WITHOUT AND WITH CONTRAST 4/1/2017 3:10 PM     COMPARISON: Brain MRI 12/1/2016.    HISTORY:  Waxing and waning left visual field change, tingling left  arm and leg, history of stroke.     TECHNIQUE: Multi-sequence, multi-planar MRI images of the brain were  acquired before and after the administration of IV gadolinium (9 mL  Gadavist).    FINDINGS: Abnormal T2 signal hyperintensity in the cortex and  subcortical white matter of the posterior tip of the right parietal  lobe is again noted without definite change from the comparison study.  Differential diagnosis is unchanged including migrational abnormality,  previous ischemic or traumatic insult and low-grade tumor. Gray-white  differentiation of the brain is otherwise normal.    The ventricles and basal cisterns are normal in configuration. There  is no midline shift. There are no extra-axial fluid collections. There  is no evidence for stroke or acute intracranial hemorrhage. There is  no abnormal contrast enhancement in the brain or its coverings.    There is no sinusitis or mastoiditis.      Impression    IMPRESSION:  1. Stable area of abnormal T2 signal hyperintensity involving the  cortex and subcortical white matter at the posterior pole of the right  parietal lobe with differential as above. Seizure activity arising  from this area of abnormal signal could be responsible for the  patient's symptoms.  2. Otherwise, normal brain MRI. No evidence for acute intracranial  pathology.     SONA AC MD   CBC with platelets, differential   Result Value Ref Range    WBC 6.6 4.0 - 11.0 10e9/L    RBC Count 4.28 3.8 - 5.2 10e12/L    Hemoglobin 12.1 11.7 - 15.7 g/dL    Hematocrit 36.7 35.0 - 47.0 %    MCV 86 78 -  100 fl    MCH 28.3 26.5 - 33.0 pg    MCHC 33.0 31.5 - 36.5 g/dL    RDW 12.5 10.0 - 15.0 %    Platelet Count 247 150 - 450 10e9/L    Diff Method Automated Method     % Neutrophils 55.4 %    % Lymphocytes 37.0 %    % Monocytes 6.0 %    % Eosinophils 0.8 %    % Basophils 0.3 %    % Immature Granulocytes 0.5 %    Absolute Neutrophil 3.7 1.6 - 8.3 10e9/L    Absolute Lymphocytes 2.5 0.8 - 5.3 10e9/L    Absolute Monocytes 0.4 0.0 - 1.3 10e9/L    Absolute Eosinophils 0.1 0.0 - 0.7 10e9/L    Absolute Basophils 0.0 0.0 - 0.2 10e9/L    Abs Immature Granulocytes 0.0 0 - 0.4 10e9/L   Basic metabolic panel   Result Value Ref Range    Sodium 138 133 - 144 mmol/L    Potassium 3.8 3.4 - 5.3 mmol/L    Chloride 104 94 - 109 mmol/L    Carbon Dioxide 27 20 - 32 mmol/L    Anion Gap 7 3 - 14 mmol/L    Glucose 84 70 - 99 mg/dL    Urea Nitrogen 14 7 - 30 mg/dL    Creatinine 0.66 0.52 - 1.04 mg/dL    GFR Estimate >90  Non  GFR Calc   >60 mL/min/1.7m2    GFR Estimate If Black >90   GFR Calc   >60 mL/min/1.7m2    Calcium 9.0 8.5 - 10.1 mg/dL   INR   Result Value Ref Range    INR 0.92 0.86 - 1.14   Erythrocyte sedimentation rate auto   Result Value Ref Range    Sed Rate 16 0 - 20 mm/h   CRP Inflammation   Result Value Ref Range    CRP Inflammation 4.4 0.0 - 8.0 mg/L   Partial thromboplastin time   Result Value Ref Range    PTT 27 22 - 37 sec           Attestation:   I have reviewed today's vital signs, notes, medications, labs and imaging.   Amount of time spent on this daily note: 25 minutes.   Aurora Rodrigues MD

## 2017-04-02 NOTE — PLAN OF CARE
"Problem: Pain, Acute (Adult)  Goal: Identify Related Risk Factors and Signs and Symptoms  Related risk factors and signs and symptoms are identified upon initiation of [employer] Clinical Practice Guideline (CPG)   Outcome: Improving  Denies blurred or double vision at this time. Neuro checks unchanged. States that she feels a \"pressure\" feeling on left side of face and over left eye. Medicated with [place] for complaints of headache. States that her headache has improved but is not gone entirely.      "

## 2017-04-02 NOTE — PLAN OF CARE
"Problem: Goal Outcome Summary  Goal: Goal Outcome Summary  Outcome: No Change  Alert and oriented. VSS. Denies discomfort. Neuro checks intact. States intermittent Left eye blurriness. Up independently to bathroom. C/o pain in IV site, warm pack applied with stated relief. /61 (BP Location: Right arm)  Pulse 65  Temp 98  F (36.7  C) (Oral)  Resp 18  Ht 1.6 m (5' 3\")  Wt 93.5 kg (206 lb 2.1 oz)  LMP 03/13/2017  SpO2 98%  BMI 36.51 kg/m2         "

## 2017-04-02 NOTE — PLAN OF CARE
Problem: Goal Outcome Summary  Goal: Goal Outcome Summary  Outcome: Improving  Patient was given vistaril and was calm, relaxed and sleepy, soon after. She is eating 75% of meals.

## 2017-04-02 NOTE — PLAN OF CARE
Problem: Goal Outcome Summary  Goal: Goal Outcome Summary  Outcome: Improving  Patient reported headache this am. Headache starts in the back and moves over the top of her head to front per patient report. She was given one norco and reported that it had not helped. She requested another norco and now reports some dizziness/lightheadedness and also reports pain has resolved but does have some feeling of pressure. Neuros have been intact except for headache today. She also reports that blurred vision is resolved but feels like there is film over left eye, which has not changed. Dr. Rodrigues was updated on patient reports of lightheadedness/dizziness and norco given. Will use tylenol for pain instead of norco.

## 2017-04-03 PROCEDURE — 12000000 ZZH R&B MED SURG/OB

## 2017-04-03 PROCEDURE — 99232 SBSQ HOSP IP/OBS MODERATE 35: CPT | Performed by: FAMILY MEDICINE

## 2017-04-03 PROCEDURE — 25000132 ZZH RX MED GY IP 250 OP 250 PS 637: Performed by: FAMILY MEDICINE

## 2017-04-03 PROCEDURE — 25000125 ZZHC RX 250: Performed by: FAMILY MEDICINE

## 2017-04-03 RX ORDER — LORAZEPAM 1 MG/1
1 TABLET ORAL EVERY 8 HOURS PRN
Status: DISCONTINUED | OUTPATIENT
Start: 2017-04-03 | End: 2017-04-05 | Stop reason: HOSPADM

## 2017-04-03 RX ORDER — ASPIRIN 81 MG/1
81 TABLET ORAL DAILY
Status: DISCONTINUED | OUTPATIENT
Start: 2017-04-04 | End: 2017-04-05 | Stop reason: HOSPADM

## 2017-04-03 RX ADMIN — DICLOFENAC 2 G: 10 GEL TOPICAL at 18:39

## 2017-04-03 RX ADMIN — LEVETIRACETAM 250 MG: 250 TABLET, FILM COATED ORAL at 21:50

## 2017-04-03 RX ADMIN — LORAZEPAM 1 MG: 1 TABLET ORAL at 21:51

## 2017-04-03 RX ADMIN — CYCLOBENZAPRINE HYDROCHLORIDE 10 MG: 10 TABLET, FILM COATED ORAL at 07:38

## 2017-04-03 RX ADMIN — PREDNISONE 40 MG: 20 TABLET ORAL at 07:38

## 2017-04-03 RX ADMIN — LEVETIRACETAM 250 MG: 250 TABLET, FILM COATED ORAL at 09:30

## 2017-04-03 RX ADMIN — SUMATRIPTAN SUCCINATE 50 MG: 50 TABLET ORAL at 12:58

## 2017-04-03 RX ADMIN — FLUOXETINE 40 MG: 20 CAPSULE ORAL at 07:38

## 2017-04-03 RX ADMIN — CYCLOBENZAPRINE HYDROCHLORIDE 10 MG: 10 TABLET, FILM COATED ORAL at 16:55

## 2017-04-03 RX ADMIN — ACETAMINOPHEN 1000 MG: 500 TABLET ORAL at 07:38

## 2017-04-03 RX ADMIN — DICLOFENAC 2 G: 10 GEL TOPICAL at 07:39

## 2017-04-03 RX ADMIN — ACETAMINOPHEN 1000 MG: 500 TABLET ORAL at 15:22

## 2017-04-03 RX ADMIN — DICLOFENAC 2 G: 10 GEL TOPICAL at 12:58

## 2017-04-03 NOTE — PROGRESS NOTES
"[facility] Progress Note           Assessment and Plan:   Acute non intractable tension-type headache  Vision changes   -  left eye blurry, left face pressure, left headache. Yesterday left arm and leg numb, has resolved. She has history of stroke 18 years ago during labor, was difficult labor resulting in  and left sided weakness and vision changes, workup showed prothrombin deficiency and Right Parietal lobe stroke. Second pregnancy she was in ICU with bleeding. Stopped ASA about a month ago when had mono and has not restarted it. Received two aspirin in ED. Stroke unit consulted by ED provider and they reviewed MRI, sxs and suspect this it \"partial focal seizure\" rather than ischemic event. They recommended starting Keppra, EEG and see neurology next week. She has sty in the left eye, upper lid and stopped drops about a week ago. Sty still present. MRI brain shows stable area of abnormal T2 signal at the posterior pole of right parietal lobe that is unchanged from comparison study and differential includes migrational abnormality, previous ischemic or traumatic insult or low-grade tumor. No evidence for acute intracranial pathology.   Plan: start Keppra, if sxs resolve this would verify partial seizure activity. EEG and neurology out patient. IVF, pain and nausea order sets.  2017- patient today confirms that the headache and dizziness have been on the past 2 days. Started with dizziness and then the headache in the left suboccipital region radiating to the whole of the left side of her headache- no preceding trauma and nothing else that she can attribute to triggering this. Sounds more as this could be a tension type syndrome. Will see if this can be aborted with Tylenol, Ketorolac 30mg, Flexeril, Prednisone, Imitrex. If not significant better, will have ophthalmologist see in am. Continue Keppra for now but if her symptoms get better with above regimen, may be able to " "discontinue.  4/3/2017 --    improved - only symptoms today are headache which is at least improved and which the flexeril seemed to help and a feeling of there being a slightly \"film\" over her left eye but the bluriness has resolved and she has no other visual symptoms today.  Will continue treatment as above, including encouraged to continue the flexeril.  Also started her home as needed ativan.  Overall, differential remains somewhat broad - with most likely etiology being tension vs migraine type headache with seizure still a possibility.  Will plan for EEG and neurology follow-up as an outpatient as above.      History of Prothrombin deficiency (H)  4/3/2017 -- stopped aspirin herself - will restart in AM.      Prophylaxis  mechanical     Disposition  Improving - hope for discharge home tomorrow if continues to improve.                Interval History:   Improving but not yet back to baseline.  Only symptoms today are persistent headache from back of head around to forehead which is unchanged although improved with flexeril when she took that and that her left eye is improved with blurriness resolved but still having a slight feeling \"like there's a film over it\".  No pain.  No photophobia.  No other issues.             Review of Systems:    ROS: 10 point ROS neg other than the symptoms noted above in the HPI.             Medications:   Current active medications and PTA medications reviewed, see medication list for details.            Physical Exam:   Vitals were reviewed  Patient Vitals for the past 24 hrs:   BP Temp Temp src Pulse Resp SpO2   17 1557 114/62 99.9  F (37.7  C) Oral 79 16 100 %   17 0725 123/73 98.5  F (36.9  C) Oral 66 16 100 %   17 0032 106/50 98.8  F (37.1  C) Oral 71 18 97 %   17 1913 114/65 98.4  F (36.9  C) Oral 73 18 98 %       Temperatures:  Current - Temp: 99.9  F (37.7  C); Max - Temp  Av.9  F (37.2  C)  Min: 98.4  F (36.9  C)  Max: 99.9  F (37.7 "  C)  Respiration range: Resp  Av  Min: 16  Max: 18  Pulse range: Pulse  Av.3  Min: 66  Max: 79  Blood pressure range: Systolic (24hrs), Av , Min:106 , Max:123   ; Diastolic (24hrs), Av, Min:50, Max:73    Pulse oximetry range: SpO2  Av.8 %  Min: 97 %  Max: 100 %     No intake or output data in the 24 hours ending 17 1192  EXAM:   GENERAL APPEARANCE ADULT: Alert, no acute distress, oriented x 3  EYES: PERRL, EOM normal, conjunctiva and lids normal, EOM normal  HENT: oral mucous membranes moist, head atraumatic and normocephalic  NECK: No adenopathy,masses or thyromegaly, supple  CV: regular rate and rhythm no murmur  Pulm: clear to auscultation bilaterally  Abdomen: soft, non-tender, no masses, no masses, normal bowel sounds.  MS: extremities normal, no peripheral edema  SKIN: no suspicious lesions or rashes  NEURO: Alert, oriented, speech and mentation normal, Cranial nerves 2-12 are normal., Strength normal and symmetrical in upper and lower extremities.  Sensation to light touch intact throughout upper and lower extremities bilaterally.   Reflexes 2+ and symmetrical at biceps, triceps, knees and ankles. Finger to nose and heel to shin testing is normal.  PSYCH: mentation appears normal, affect and mood normal   Normal neurologic exam today.              Data:   No results found for this or any previous visit (from the past 24 hour(s)).        Attestation:  I have reviewed today's vital signs, notes, medications, labs and imaging.  Amount of time performed on this daily note: 30 minutes.     Ismael Romero MD, MD

## 2017-04-03 NOTE — PLAN OF CARE
"Problem: Goal Outcome Summary  Goal: Goal Outcome Summary  Outcome: Improving  Patient has been able to sleep during night, improvement in reduced headache since evening dose of imitrex. Neuro checks WNL. VSS. Drinking water well. Up independently to void. Patient feels like \"the left side of my head is still different. Even when I open my eyes, my left eye just feels different. Kind of like pressure.\" Denies throbbing/pulsing in head, more of a dull ache. Ice pack given to try on left back of head. Patient frustrated that kitchen skipped given her a dinner tray yesterday, wants to be awake for breakfast.       "

## 2017-04-03 NOTE — PLAN OF CARE
Problem: Goal Outcome Summary  Goal: Goal Outcome Summary  Outcome: Improving  Patient was given flexeril and tylenol this am. She reported that it took her headache away but still had pressure on left side and back of head. She took imitrex this afternoon with no relief of headache. She was given tylenol for headache without flexeril this at 1522. Neuros wnls except headache/pressure and blurred vision.

## 2017-04-03 NOTE — PLAN OF CARE
Problem: Goal Outcome Summary  Goal: Goal Outcome Summary  Outcome: Improving  Pt was able to sleep early in shift after meds, and pain much improved.  Started again with headache about 2215 and was medicated with imitrex this time to see if that might help.  Appetite good.  Up independently in the room, no dizziness.  Having difficulty with IV restart, fluids discontinued but feel SL should be placed.  Spoke to night charge re:YARITZA Diane RN

## 2017-04-03 NOTE — PROGRESS NOTES
Dr Gil updated difficulty of restarting IV. Approval to leave IV out at this time and restart if needed.

## 2017-04-04 ENCOUNTER — TELEPHONE (OUTPATIENT)
Dept: NEUROLOGY | Facility: CLINIC | Age: 42
End: 2017-04-04

## 2017-04-04 LAB
ANION GAP SERPL CALCULATED.3IONS-SCNC: 9 MMOL/L (ref 3–14)
BASOPHILS # BLD AUTO: 0 10E9/L (ref 0–0.2)
BASOPHILS NFR BLD AUTO: 0.2 %
BUN SERPL-MCNC: 16 MG/DL (ref 7–30)
CALCIUM SERPL-MCNC: 8.8 MG/DL (ref 8.5–10.1)
CHLORIDE SERPL-SCNC: 111 MMOL/L (ref 94–109)
CO2 SERPL-SCNC: 25 MMOL/L (ref 20–32)
CREAT SERPL-MCNC: 0.8 MG/DL (ref 0.52–1.04)
CRP SERPL-MCNC: <2.9 MG/L (ref 0–8)
DIFFERENTIAL METHOD BLD: NORMAL
EOSINOPHIL # BLD AUTO: 0 10E9/L (ref 0–0.7)
EOSINOPHIL NFR BLD AUTO: 0.5 %
ERYTHROCYTE [DISTWIDTH] IN BLOOD BY AUTOMATED COUNT: 12.7 % (ref 10–15)
GFR SERPL CREATININE-BSD FRML MDRD: 79 ML/MIN/1.7M2
GLUCOSE SERPL-MCNC: 80 MG/DL (ref 70–99)
HCT VFR BLD AUTO: 36.1 % (ref 35–47)
HGB BLD-MCNC: 11.8 G/DL (ref 11.7–15.7)
IMM GRANULOCYTES # BLD: 0.1 10E9/L (ref 0–0.4)
IMM GRANULOCYTES NFR BLD: 0.7 %
LYMPHOCYTES # BLD AUTO: 3.3 10E9/L (ref 0.8–5.3)
LYMPHOCYTES NFR BLD AUTO: 38.4 %
MCH RBC QN AUTO: 28.4 PG (ref 26.5–33)
MCHC RBC AUTO-ENTMCNC: 32.7 G/DL (ref 31.5–36.5)
MCV RBC AUTO: 87 FL (ref 78–100)
MONOCYTES # BLD AUTO: 0.6 10E9/L (ref 0–1.3)
MONOCYTES NFR BLD AUTO: 7.5 %
NEUTROPHILS # BLD AUTO: 4.5 10E9/L (ref 1.6–8.3)
NEUTROPHILS NFR BLD AUTO: 52.7 %
PLATELET # BLD AUTO: 249 10E9/L (ref 150–450)
POTASSIUM SERPL-SCNC: 3.7 MMOL/L (ref 3.4–5.3)
RBC # BLD AUTO: 4.16 10E12/L (ref 3.8–5.2)
SODIUM SERPL-SCNC: 145 MMOL/L (ref 133–144)
WBC # BLD AUTO: 8.6 10E9/L (ref 4–11)

## 2017-04-04 PROCEDURE — 25000132 ZZH RX MED GY IP 250 OP 250 PS 637: Performed by: FAMILY MEDICINE

## 2017-04-04 PROCEDURE — 85049 AUTOMATED PLATELET COUNT: CPT | Performed by: FAMILY MEDICINE

## 2017-04-04 PROCEDURE — 85025 COMPLETE CBC W/AUTO DIFF WBC: CPT | Performed by: FAMILY MEDICINE

## 2017-04-04 PROCEDURE — 80048 BASIC METABOLIC PNL TOTAL CA: CPT | Performed by: FAMILY MEDICINE

## 2017-04-04 PROCEDURE — 12000000 ZZH R&B MED SURG/OB

## 2017-04-04 PROCEDURE — 36415 COLL VENOUS BLD VENIPUNCTURE: CPT | Performed by: FAMILY MEDICINE

## 2017-04-04 PROCEDURE — 99232 SBSQ HOSP IP/OBS MODERATE 35: CPT | Performed by: FAMILY MEDICINE

## 2017-04-04 PROCEDURE — 25000125 ZZHC RX 250: Performed by: FAMILY MEDICINE

## 2017-04-04 PROCEDURE — 86140 C-REACTIVE PROTEIN: CPT | Performed by: FAMILY MEDICINE

## 2017-04-04 RX ORDER — TRAMADOL HYDROCHLORIDE 50 MG/1
50-100 TABLET ORAL EVERY 6 HOURS PRN
Status: DISCONTINUED | OUTPATIENT
Start: 2017-04-04 | End: 2017-04-05 | Stop reason: HOSPADM

## 2017-04-04 RX ADMIN — LEVETIRACETAM 250 MG: 250 TABLET, FILM COATED ORAL at 20:49

## 2017-04-04 RX ADMIN — ACETAMINOPHEN 1000 MG: 500 TABLET ORAL at 20:49

## 2017-04-04 RX ADMIN — LEVETIRACETAM 250 MG: 250 TABLET, FILM COATED ORAL at 08:10

## 2017-04-04 RX ADMIN — TRAMADOL HYDROCHLORIDE 50 MG: 50 TABLET, COATED ORAL at 15:53

## 2017-04-04 RX ADMIN — ACETAMINOPHEN 1000 MG: 500 TABLET ORAL at 12:46

## 2017-04-04 RX ADMIN — CYCLOBENZAPRINE HYDROCHLORIDE 10 MG: 10 TABLET, FILM COATED ORAL at 18:35

## 2017-04-04 RX ADMIN — TRAMADOL HYDROCHLORIDE 100 MG: 50 TABLET, COATED ORAL at 22:52

## 2017-04-04 RX ADMIN — ASPIRIN 81 MG: 81 TABLET, COATED ORAL at 08:10

## 2017-04-04 RX ADMIN — FLUOXETINE 40 MG: 20 CAPSULE ORAL at 08:10

## 2017-04-04 RX ADMIN — CYCLOBENZAPRINE HYDROCHLORIDE 10 MG: 10 TABLET, FILM COATED ORAL at 08:52

## 2017-04-04 RX ADMIN — DICLOFENAC 2 G: 10 GEL TOPICAL at 16:10

## 2017-04-04 RX ADMIN — PREDNISONE 40 MG: 20 TABLET ORAL at 08:10

## 2017-04-04 RX ADMIN — TRAMADOL HYDROCHLORIDE 50 MG: 50 TABLET, COATED ORAL at 17:02

## 2017-04-04 NOTE — PLAN OF CARE
"Problem: Goal Outcome Summary  Goal: Goal Outcome Summary  Outcome: No Change  Pt tearful mid-afternoon, reporting headache increased and did not want IV started.  Rec'd order to keep IV out from Dr Romero.  Pt states she has been told not to take ibuprofen or NSAIDS in the past, does not like the way norco makes her feel, had previous experience of \"profuse sweating\" from another narcotic but pt unsure of the name. Has used tramadol in the past without side effects. Rec'd order for tramadol, initial 50 mg dose not effective, second 50 mg given along with ice pack for head, voltaren gel massaged to left trapezius region. Second flexeril for the day given now, pt calm, appreciative and reports headache improving. Neuros unchanged.       "

## 2017-04-04 NOTE — TELEPHONE ENCOUNTER
Reason for Call:  Other     Detailed comments: Pt currently an inpatient - Came in with intractable tension headache, vision changes, left arm and leg numbness. She had a stroke 18 years ago. Medical staff spoke to physician at [facility] last night who recommended starting Keppra, getting an EEG, and to make appt for pt to be seen by neurology next week. - Please call to set up appt.     Phone Number Patient can be reached at: *[phone] - [place]    Best Time: Any    Can we leave a detailed message on this number? YES    Call taken on 4/4/2017 at 8:26 AM by Denise Behrendt

## 2017-04-04 NOTE — PLAN OF CARE
"Problem: Goal Outcome Summary  Goal: Goal Outcome Summary  Outcome: No Change  Pt questioning discharge plans this morning stating she wants to be transferred \"somewhere where they can do something.\"  Pt reports left eye continues to be \"glazed over\".  Medicated this morning with flexeril for tolerable headache.  Upon return from ophthalmology appt at 1245 pt denies tingling or numbness but states \"my face feels weird.\" Headache worse, pt states from stress.  Medicated with tylenol and ice to head.  Pt's father and step mother present since before eye appt and want to speak with customer relations regarding neurology appt/EEG.  They are upset that these appts are currently set up as outpt.       "

## 2017-04-04 NOTE — PLAN OF CARE
"Problem: Goal Outcome Summary  Goal: Goal Outcome Summary  Outcome: Improving  Patient tearful this evening and voiced feeling frustrated with lack of \"exact answers.\"  Reassurance and active listening provided. Reported bilat LE tingling, no numbness. Described as \"pins and needles.\" MD notified and evaluated patient with provided her with helpful additional reassurance. Patient repositioned and PCDs applied. Aqua k pad to upper back/shoulders for muscle tension and reported helpful.  Calming oil aromatherapy utilized.       "

## 2017-04-04 NOTE — CONSULTS
OPHTHALMOLOGY CONSULT NOTE    Date:  2017    Patient:  Doretha Fernandez  :  1975    Referring Provider:    Ismael Romero    Reason for Consult:    Asked to see patient with headache and visual changes.  Patient reports that 3/31 she developed sudden blurred vision OS described as colored lines through her temporal and central VF of the left eye.  She denies involvement in the right eye.  She states that her vision was blurry for about 1 hour, and then developed a severe headache.  She presented to the ER the next day and was admitted to the hospital.  She reports that the vision in the left eye seems a little more blurry than usual since 3/31.  She denies diplopia or floaters.  Her history is noted for a right parietal subcortical white matter abnormalities, possibly related to stroke in .  She also has a history of a chronic chalazion of the left upper eyelid which has been present since last fall.  Intermittently this area becomes more inflamed.    Exam:  Documented in medical record.  Visual acuity with correction is 20/30 OD and 20/25 OS.  Her pupils are normal without an APD.  Visual field is full bilaterally.  Anterior segment is normal bilaterally, except for left upper eyelid ulcerated skin lesion along the lash line.  There is moderate vascularity in the area with thickened epithelium.  The fundus examination is normal bilaterally.  There is no papilledema.  No signs of retinal pathology.    Impression:    42 year old female with intermittent vision changes and headache.    1. No ophthalmic etiology identified.  No signs of papilledema (increased ICP).  No signs of optic neuritis.  2. Incidental left upper eyelid lesion - chronic / ulcerated.    Plan:    1. Agree with Neurology consultation for evaluation / management.  2. Patient will follow up in Ophthalmology clinic in 1-2 weeks for excisional biopsy of left upper eyelid.      Thank you for allowing me to participate in the care of your  patient.        Lowell Valdes MD  [facility]  [phone]

## 2017-04-04 NOTE — PLAN OF CARE
Problem: Goal Outcome Summary  Goal: Goal Outcome Summary  Outcome: Improving  Patient slept soundly all night. She stated pain was improving. Denied any pain medication overnight. Patient stated that her vision was normal and she had no numbness or tingling. Aqua k on shoulders.

## 2017-04-05 VITALS
SYSTOLIC BLOOD PRESSURE: 123 MMHG | OXYGEN SATURATION: 95 % | DIASTOLIC BLOOD PRESSURE: 71 MMHG | HEIGHT: 63 IN | TEMPERATURE: 98 F | RESPIRATION RATE: 18 BRPM | WEIGHT: 206.13 LBS | HEART RATE: 79 BPM | BODY MASS INDEX: 36.52 KG/M2

## 2017-04-05 PROBLEM — D68.52 PROTHROMBIN MUTATION (H): Status: ACTIVE | Noted: 2017-04-05

## 2017-04-05 PROCEDURE — 99239 HOSP IP/OBS DSCHRG MGMT >30: CPT | Performed by: FAMILY MEDICINE

## 2017-04-05 PROCEDURE — 25000132 ZZH RX MED GY IP 250 OP 250 PS 637: Performed by: FAMILY MEDICINE

## 2017-04-05 PROCEDURE — 25000125 ZZHC RX 250: Performed by: FAMILY MEDICINE

## 2017-04-05 RX ORDER — CYCLOBENZAPRINE HCL 10 MG
10 TABLET ORAL 3 TIMES DAILY PRN
Qty: 42 TABLET | Refills: 0 | Status: SHIPPED | OUTPATIENT
Start: 2017-04-05 | End: 2017-07-21

## 2017-04-05 RX ORDER — LEVETIRACETAM 250 MG/1
250 TABLET ORAL 2 TIMES DAILY
Qty: 60 TABLET | Refills: 0 | Status: SHIPPED | OUTPATIENT
Start: 2017-04-05 | End: 2017-04-24

## 2017-04-05 RX ORDER — TRAMADOL HYDROCHLORIDE 50 MG/1
50-100 TABLET ORAL EVERY 6 HOURS PRN
Qty: 80 TABLET | Refills: 0 | Status: SHIPPED | OUTPATIENT
Start: 2017-04-05 | End: 2017-05-17

## 2017-04-05 RX ADMIN — ACETAMINOPHEN 1000 MG: 500 TABLET ORAL at 08:35

## 2017-04-05 RX ADMIN — ASPIRIN 81 MG: 81 TABLET, COATED ORAL at 08:33

## 2017-04-05 RX ADMIN — TRAMADOL HYDROCHLORIDE 50 MG: 50 TABLET, COATED ORAL at 08:33

## 2017-04-05 RX ADMIN — FLUOXETINE 40 MG: 20 CAPSULE ORAL at 08:34

## 2017-04-05 RX ADMIN — CYCLOBENZAPRINE HYDROCHLORIDE 10 MG: 10 TABLET, FILM COATED ORAL at 11:24

## 2017-04-05 RX ADMIN — LEVETIRACETAM 250 MG: 250 TABLET, FILM COATED ORAL at 08:34

## 2017-04-05 RX ADMIN — PREDNISONE 40 MG: 20 TABLET ORAL at 08:34

## 2017-04-05 NOTE — PLAN OF CARE
"Problem: Goal Outcome Summary  Goal: Goal Outcome Summary  Outcome: No Change  Pt alert and oriented, moves well in room independently.  Pt reports sleeping well last night after receiving tramadol 100 mg.  Eating and drinking well, no problems with bowel or bladder.  Neuros stable.  Pt reports \"glazed feeling\" over left eye and states the left side of her face continues to feel \"wierd\".  Medicated this morning with tramadol 50 mg and tylenol for \"head pressure\".  Pt did not want 100 mg of tramadol or flexeril added at this time because she didn't want to be sleepy.       "

## 2017-04-05 NOTE — PROGRESS NOTES
[facility]           Assessment and Plan:     Acute non intractable tension-type headache  Vision changes   -  left eye blurry, left face pressure, left headache. Yesterday left arm and leg numb, has resolved. She has history of stroke 18 years ago during labor, was difficult labor resulting in  and left sided weakness and vision changes, workup showed prothrombin deficiency and Right Parietal lobe stroke. Second pregnancy she was in ICU with bleeding. Stopped ASA about a month ago when had mono and has not restarted it. Received two aspirin in ED. Stroke unit consulted by ED provider and they reviewed MRI, sxs and suspect this it \"partial focal seizure\" rather than ischemic event. They recommended starting Keppra, EEG and see neurology next week. She has sty in the left eye, upper lid and stopped drops about a week ago. Sty still present. MRI brain shows stable area of abnormal T2 signal at the posterior pole of right parietal lobe that is unchanged from comparison study and differential includes migrational abnormality, previous ischemic or traumatic insult or low-grade tumor. No evidence for acute intracranial pathology.   Plan: start Keppra, if sxs resolve this would verify partial seizure activity. EEG and neurology out patient. IVF, pain and nausea order sets.  2017- patient today confirms that the headache and dizziness have been on the past 2 days. Started with dizziness and then the headache in the left suboccipital region radiating to the whole of the left side of her headache- no preceding trauma and nothing else that she can attribute to triggering this. Sounds more as this could be a tension type syndrome. Will see if this can be aborted with Tylenol, Ketorolac 30mg, Flexeril, Prednisone, Imitrex. If not significant better, will have ophthalmologist see in am. Continue Keppra for now but if her symptoms get better with above regimen, may be able to " "discontinue.  4/3/2017 --    improved - only symptoms today are headache which is at least improved and which the flexeril seemed to help and a feeling of there being a slightly \"film\" over her left eye but the bluriness has resolved and she has no other visual symptoms today. Will continue treatment as above, including encouraged to continue the flexeril. Also started her home as needed ativan. Overall, differential remains somewhat broad - with most likely etiology being tension vs migraine type headache with seizure still a possibility. Will plan for EEG and neurology follow-up as an outpatient as above.   4/4/2017 -- seen by ophthalmology today and had normal ophthalmologic evaluation other than small left upper eyelid lesion with plan for follow-up with them in 1-2 weeks for likely excision of this if it persists.  Neurologic exam remains unremarkable.  No symptoms other than headache.  Discussed case with neurology -they are unable to see her today but agreed that outpatient follow-up with them as has been the plan per neurology since her admission is appropriate.  Agreed with plan for outpatient EEG on Thurs and follow-up with them 1 week later - they were willing to work her in on 4/13 despite full schedule.  Discussed options with patient - she did not want to discharge home yet with headache unchanged but continued to refuse IV replacement - wanted to try oral ultram as only option she was open to - did not want narcotics or antiemetics and does not take NSAIDS given history of prothrombin deficiency.  Will try Ultram as needed as requested.  Discussed that current presentation is reassuring and that neurology's recommendations for outpatient follow-up after EEG and continuing Kepra in the meantime appear appropriate.  Advised no driving until neurology evaluation.         History of Prothrombin deficiency (H)  4/3/2017 -- stopped aspirin herself - will restart in AM.      Prophylaxis  mechanical " "    Disposition  Hope for discharge home tomorrow.    Of note patient and family remain intermittently unhappy that they were apparently told over the weekend that neurology would see her on Monday (not available) and that her evaluation would be done as an inpatient - from her admission notes this was not the plan, and neurology is only intermittently available here.  I apologized for the miscommunication and for her anxiety as a result, but reassured her that neurology on admission, yesterday and today agreed with her current plan.              Interval History:   No new concerns.  headache more or less unchanged, although patient has refused any medications for this so far today and has refused to have IV replaced.  Vision essentially normal but still slight \"flimy\" feeling in left eye.  No other symptoms or changes today, no numbness no weakness, no fever or chills. No neck pain.  No other concerns.             Review of Systems:    ROS: 10 point ROS neg other than the symptoms noted above in the HPI.             Medications:   Current active medications and PTA medications reviewed, see medication list for details.            Physical Exam:   Vitals were reviewed  Patient Vitals for the past 24 hrs:   BP Temp Temp src Heart Rate Resp SpO2   17 1630 105/59 98.7  F (37.1  C) Oral 65 18 96 %   17 0716 102/60 98.2  F (36.8  C) Oral 60 16 98 %   17 0315 106/59 98.4  F (36.9  C) Oral 65 18 97 %   17 2307 93/59 98.3  F (36.8  C) Oral 73 18 98 %       Temperatures:  Current - Temp: 98.7  F (37.1  C); Max - Temp  Av.4  F (36.9  C)  Min: 98.2  F (36.8  C)  Max: 98.7  F (37.1  C)  Respiration range: Resp  Av.5  Min: 16  Max: 18  Pulse range: No Data Recorded  Blood pressure range: Systolic (24hrs), Av , Min:93 , Max:106   ; Diastolic (24hrs), Av, Min:59, Max:60    Pulse oximetry range: SpO2  Av.3 %  Min: 96 %  Max: 98 %  I/O last 3 completed shifts:  In: 1220 " [P.O.:1220]  Out: -     Intake/Output Summary (Last 24 hours) at 04/04/17 1934  Last data filed at 04/04/17 1800   Gross per 24 hour   Intake              840 ml   Output                0 ml   Net              840 ml     EXAM:   GENERAL APPEARANCE ADULT: Alert, no acute distress, oriented x 3  EYES: PERRL, EOM normal, conjunctiva and lids normal, EOM normal  HENT: oral mucous membranes moist, head atraumatic and normocephalic  NECK: No adenopathy,masses or thyromegaly, supple  CV: regular rate and rhythm no murmur  Pulm: clear to auscultation bilaterally  Abdomen: soft, non-tender, no masses, no masses, normal bowel sounds.  MS: extremities normal, no peripheral edema  SKIN: no suspicious lesions or rashes  NEURO: Alert, oriented, speech and mentation normal, Cranial nerves 2-12 are normal., Strength normal and symmetrical in upper and lower extremities.  Sensation to light touch intact throughout upper and lower extremities bilaterally.   Reflexes 2+ and symmetrical at biceps, triceps, knees and ankles. Finger to nose and heel to shin testing is normal.  PSYCH: mentation appears normal, affect and mood normal             Data:     Results for orders placed or performed during the hospital encounter of 04/01/17 (from the past 24 hour(s))   CBC with platelets differential   Result Value Ref Range    WBC 8.6 4.0 - 11.0 10e9/L    RBC Count 4.16 3.8 - 5.2 10e12/L    Hemoglobin 11.8 11.7 - 15.7 g/dL    Hematocrit 36.1 35.0 - 47.0 %    MCV 87 78 - 100 fl    MCH 28.4 26.5 - 33.0 pg    MCHC 32.7 31.5 - 36.5 g/dL    RDW 12.7 10.0 - 15.0 %    Platelet Count 249 150 - 450 10e9/L    Diff Method Automated Method     % Neutrophils 52.7 %    % Lymphocytes 38.4 %    % Monocytes 7.5 %    % Eosinophils 0.5 %    % Basophils 0.2 %    % Immature Granulocytes 0.7 %    Absolute Neutrophil 4.5 1.6 - 8.3 10e9/L    Absolute Lymphocytes 3.3 0.8 - 5.3 10e9/L    Absolute Monocytes 0.6 0.0 - 1.3 10e9/L    Absolute Eosinophils 0.0 0.0 - 0.7  10e9/L    Absolute Basophils 0.0 0.0 - 0.2 10e9/L    Abs Immature Granulocytes 0.1 0 - 0.4 10e9/L   Basic metabolic panel   Result Value Ref Range    Sodium 145 (H) 133 - 144 mmol/L    Potassium 3.7 3.4 - 5.3 mmol/L    Chloride 111 (H) 94 - 109 mmol/L    Carbon Dioxide 25 20 - 32 mmol/L    Anion Gap 9 3 - 14 mmol/L    Glucose 80 70 - 99 mg/dL    Urea Nitrogen 16 7 - 30 mg/dL    Creatinine 0.80 0.52 - 1.04 mg/dL    GFR Estimate 79 >60 mL/min/1.7m2    GFR Estimate If Black >90   GFR Calc   >60 mL/min/1.7m2    Calcium 8.8 8.5 - 10.1 mg/dL   CRP inflammation   Result Value Ref Range    CRP Inflammation <2.9 0.0 - 8.0 mg/L   Ophthalmology IP Consult: blurry vision left eye; Consultant may enter orders: Yes; Patient to be seen: Routine - within 24 hours    Narrative    Lowell Valdes MD     2017 12:47 PM  OPHTHALMOLOGY CONSULT NOTE    Date:  2017    Patient:  Doretha Fernandez  :  1975    Referring Provider:    Ismael Romero    Reason for Consult:    Asked to see patient with headache and visual changes.  Patient   reports that 3/31 she developed sudden blurred vision OS   described as colored lines through her temporal and central VF of   the left eye.  She denies involvement in the right eye.  She   states that her vision was blurry for about 1 hour, and then   developed a severe headache.  She presented to the ER the next   day and was admitted to the hospital.  She reports that the   vision in the left eye seems a little more blurry than usual   since 3/31.  She denies diplopia or floaters.  Her history is   noted for a right parietal subcortical white matter   abnormalities, possibly related to stroke in .  She also has   a history of a chronic chalazion of the left upper eyelid which   has been present since last fall.  Intermittently this area   becomes more inflamed.    Exam:  Documented in medical record.  Visual acuity with   correction is 20/30 OD and 20/25 OS.  Her  pupils are normal   without an APD.  Visual field is full bilaterally.  Anterior   segment is normal bilaterally, except for left upper eyelid   ulcerated skin lesion along the lash line.  There is moderate   vascularity in the area with thickened epithelium.  The fundus   examination is normal bilaterally.  There is no papilledema.  No   signs of retinal pathology.    Impression:    42 year old female with intermittent vision changes and headache.    1. No ophthalmic etiology identified.  No signs of papilledema   (increased ICP).  No signs of optic neuritis.  2. Incidental left upper eyelid lesion - chronic / ulcerated.    Plan:    1. Agree with Neurology consultation for evaluation / management.  2. Patient will follow up in Ophthalmology clinic in 1-2 weeks   for excisional biopsy of left upper eyelid.      Thank you for allowing me to participate in the care of your   patient.        Lowell Valdes MD  [facility]  [phone]           Attestation:  I have reviewed today's vital signs, notes, medications, labs and imaging.  Amount of time performed on this daily note: 30 minutes.     Ismael Romero MD, MD

## 2017-04-05 NOTE — PLAN OF CARE
"Problem: Pain, Acute (Adult)  Goal: Acceptable Pain Control/Comfort Level  Patient will demonstrate the desired outcomes by discharge/transition of care.   Outcome: Improving  Patient is alert, oriented, independent. Prn tramadol given for c/o of leg pain. LS clear. Pt c/o left intermittent blurriness to eye and \"eye feels glazed over\". Pt denies SOB, nausea. Denies numbness or tingling. Pt states left side of face feels funny. Neuros intact, face symmetrical in appearance. Pt refused PCD's. Rested comfortably overnight. /60 (BP Location: Right arm)  Pulse 57  Temp 98.4  F (36.9  C) (Oral)  Resp 18  Ht 1.6 m (5' 3\")  Wt 93.5 kg (206 lb 2.1 oz)  LMP 03/13/2017  SpO2 99%  BMI 36.51 kg/m2          "

## 2017-04-05 NOTE — PLAN OF CARE
Problem: Discharge Planning  Goal: Discharge Planning (Adult, OB, Behavioral, Peds)  Outcome: Adequate for Discharge Date Met:  04/05/17  FRANNIE ROMERO DISCHARGE NOTE     Patient discharged to home at 12:25 PM via ambulation. Accompanied by father and staff. Discharge instructions reviewed with patient, opportunity offered to ask questions. Prescriptions sent to patients preferred pharmacy. All belongings sent with patient.   Pt given [facility] appt scheduled for 04/18/17 at 1:15 PM.  Written information given on new medications. Written instructions given on EEG preparation.   Britany Urbano

## 2017-04-05 NOTE — DISCHARGE SUMMARY
[facility] Discharge Summary    Doretha Fernandez MRN# [id number]   Age: 42 year old YOB: 1975     Date of Admission:  4/1/2017  Date of Discharge::  4/5/2017  Admitting Physician:  Marcela Treviño MD  Discharge Physician:  Ismael Romero MD, MD             Admission Diagnoses:     Vision changes [H53.9]          Principle Discharge Diagnosis:   Acute intractable tension type headache    See hospital course for further active diagnoses addressed during this admission.            Procedures:   See EMR for imaging results.           Medications Prior to Admission:     Prescriptions Prior to Admission   Medication Sig Dispense Refill Last Dose     calcium carbonate (TUMS) 500 MG chewable tablet Take 1 chew tab by mouth daily as needed for heartburn   Past Week at Unknown time     FLUoxetine (PROZAC) 20 MG capsule Take 40 mg by mouth daily 12/6/16 -- reduced to 40 mg daily 270 capsule 01 4/1/2017 at am     Acetaminophen (TYLENOL PO) Take 1,000 mg by mouth every 6 hours as needed for mild pain or fever   3/31/2017 at Unknown time     fluticasone (FLONASE) 50 MCG/ACT nasal spray Spray 2 sprays into both nostrils daily 1 g 3 Past Week at Unknown time     amoxicillin-clavulanate (AUGMENTIN) 875-125 MG per tablet Take 1 tablet by mouth 2 times daily 20 tablet 0 not started     LORazepam (ATIVAN) 1 MG tablet Take 1 tablet (1 mg) by mouth every 8 hours as needed for anxiety 30 tablet 0 More than a month at Unknown time             Discharge Medications:     Current Discharge Medication List      START taking these medications    Details   traMADol (ULTRAM) 50 MG tablet Take 1-2 tablets ( mg) by mouth every 6 hours as needed  Qty: 80 tablet, Refills: 0    Associated Diagnoses: Intractable tension-type headache, unspecified chronicity pattern      aspirin EC 81 MG EC tablet Take 1 tablet (81 mg) by mouth daily  Qty: 30 tablet, Refills: 1    Associated Diagnoses: Prothrombin mutation (H)       levETIRAcetam (KEPPRA) 250 MG tablet Take 1 tablet (250 mg) by mouth 2 times daily  Qty: 60 tablet, Refills: 0    Associated Diagnoses: Spells      cyclobenzaprine (FLEXERIL) 10 MG tablet Take 1 tablet (10 mg) by mouth 3 times daily as needed for muscle spasms  Qty: 42 tablet, Refills: 0    Associated Diagnoses: Intractable tension-type headache, unspecified chronicity pattern         CONTINUE these medications which have NOT CHANGED    Details   calcium carbonate (TUMS) 500 MG chewable tablet Take 1 chew tab by mouth daily as needed for heartburn      FLUoxetine (PROZAC) 20 MG capsule Take 40 mg by mouth daily 12/6/16 -- reduced to 40 mg daily  Qty: 270 capsule, Refills: 01    Associated Diagnoses: Major depressive disorder, single episode, mild (H)      Acetaminophen (TYLENOL PO) Take 1,000 mg by mouth every 6 hours as needed for mild pain or fever      fluticasone (FLONASE) 50 MCG/ACT nasal spray Spray 2 sprays into both nostrils daily  Qty: 1 g, Refills: 3    Associated Diagnoses: Seasonal allergic rhinitis                 LORazepam (ATIVAN) 1 MG tablet Take 1 tablet (1 mg) by mouth every 8 hours as needed for anxiety  Qty: 30 tablet, Refills: 0    Associated Diagnoses: Anxiety; Acute intractable tension-type headache                   Consultations:     Phone consultation with neurology  Ophthalmology           Brief History of Illness:     From Admission H+P:   Patient reports that she has ongoing left occipital and left sided cynthia cranial headache, no associated light sensitivity (though she felt more sensitive in this eye than the right when light was directly shone into it), no nausea, vomiting, no light sensitivity.                 TODAY:   Subjective:  Improved still has headache in same area - left side from posterior head wrapping around to forehead, but improved with ultram.  No new symptoms.  Vision normal today.  No fever or chills.  No tingling numbness or weakness. No dyspnea.  Feels otherwise at  "baseline and feels good about going home today although she wishes her headache was gone, feels comfortable with the ultram.      ROS:   ROS: 10 point ROS neg other than the symptoms noted above in the HPI.     /72 (BP Location: Right arm)  Pulse 71  Temp 98.1  F (36.7  C) (Oral)  Resp 18  Ht 1.6 m (5' 3\")  Wt 93.5 kg (206 lb 2.1 oz)  LMP 2017  SpO2 98%  BMI 36.51 kg/m2   EXAM:   GENERAL APPEARANCE ADULT: Alert, no acute distress, oriented x 3  EYES: PERRL, EOM normal, conjunctiva and lids normal, EOM normal  HENT: oral mucous membranes moist, head atraumatic and normocephalic  NECK: No adenopathy,masses or thyromegaly, supple  CV: regular rate and rhythm no murmur  Pulm: clear to auscultation bilaterally  Abdomen: soft, non-tender, no masses, no masses, normal bowel sounds.  MS: extremities normal, no peripheral edema  SKIN: no suspicious lesions or rashes  NEURO: Alert, oriented, speech and mentation normal, Cranial nerves 2-12 are normal., Strength normal and symmetrical in upper and lower extremities.  Sensation to light touch intact throughout upper and lower extremities bilaterally.   Reflexes 2+ and symmetrical at biceps, triceps, knees and ankles. Finger to nose and heel to shin testing is normal.  PSYCH: mentation appears normal, affect and mood normal   Continues to have an unremarkable exam.           Hospital Course:   Acute non intractable tension-type headache  Vision changes  4/1 - left eye blurry, left face pressure, left headache. Yesterday left arm and leg numb, has resolved. She has history of stroke 18 years ago during labor, was difficult labor resulting in  and left sided weakness and vision changes, workup showed prothrombin deficiency and Right Parietal lobe stroke. Second pregnancy she was in ICU with bleeding. Stopped ASA about a month ago when had mono and has not restarted it. Received two aspirin in ED. Stroke unit consulted by ED provider and they reviewed " "MRI, sxs and suspect this it \"partial focal seizure\" rather than ischemic event. They recommended starting Keppra, EEG and see neurology next week. She has sty in the left eye, upper lid and stopped drops about a week ago. Sty still present. MRI brain shows stable area of abnormal T2 signal at the posterior pole of right parietal lobe that is unchanged from comparison study and differential includes migrational abnormality, previous ischemic or traumatic insult or low-grade tumor. No evidence for acute intracranial pathology.   Plan: start Keppra, if sxs resolve this would verify partial seizure activity. EEG and neurology out patient. IVF, pain and nausea order sets.  4/2/2017- patient today confirms that the headache and dizziness have been on the past 2 days. Started with dizziness and then the headache in the left suboccipital region radiating to the whole of the left side of her headache- no preceding trauma and nothing else that she can attribute to triggering this. Sounds more as this could be a tension type syndrome. Will see if this can be aborted with Tylenol, Ketorolac 30mg, Flexeril, Prednisone, Imitrex. If not significant better, will have ophthalmologist see in am. Continue Keppra for now but if her symptoms get better with above regimen, may be able to discontinue.  4/3/2017 --    improved - only symptoms today are headache which is at least improved and which the flexeril seemed to help and a feeling of there being a slightly \"film\" over her left eye but the bluriness has resolved and she has no other visual symptoms today. Will continue treatment as above, including encouraged to continue the flexeril. Also started her home as needed ativan. Overall, differential remains somewhat broad - with most likely etiology being tension vs migraine type headache with seizure still a possibility. Will plan for EEG and neurology follow-up as an outpatient as above.   4/4/2017 -- seen by ophthalmology today and " had normal ophthalmologic evaluation other than small left upper eyelid lesion with plan for follow-up with them in 1-2 weeks for likely excision of this if it persists. Neurologic exam remains unremarkable. No symptoms other than headache. Discussed case with neurology -they are unable to see her today but agreed that outpatient follow-up with them as has been the plan per neurology since her admission is appropriate. Agreed with plan for outpatient EEG on Thurs and follow-up with them 1 week later - they were willing to work her in on 4/13 despite full schedule. Discussed options with patient - she did not want to discharge home yet with headache unchanged but continued to refuse IV replacement - wanted to try oral ultram as only option she was open to - did not want narcotics or antiemetics and does not take NSAIDS given history of prothrombin deficiency. Will try Ultram as needed as requested. Discussed that current presentation is reassuring and that neurology's recommendations for outpatient follow-up after EEG and continuing Kepra in the meantime appear appropriate. Advised no driving until neurology evaluation.    4/5/2017 -- no concerns today other than headache which is unchanged and persists but is better with the ultram - feels good about discharge home today - will discharge with ultram and flexeril as needed.  Will continue Keppra for now as per neurology's recommendations.  Follow-up for EEG tomorrow as planned and with neurology on 4/13.        History of Prothrombin mutation (H)  4/3/2017 -- stopped aspirin herself - will restart in AM.    4/5/2017 -- last saw hematology in 2008 - they recommended daily aspirin 81 mg.  However, they also noted that they were waiting to get her actual results and that this was a prothrombin mutation - patient reports that she was told not to take NSAIDS due to bleeding risk - recommend follow-up with hematology as outpatient to confirm what her actual prothrombin  results are and to confirm recommendations re NSAIDS.        Of note - discussed ultram in context of other medications and possible seizure history with neurology - I think this is a reasonable option for her in the short term given that she is refusing all other medications and the lingering uncertainty about her prothrombin mutation as above - neurology agreed.            Discharge Instructions and Follow-Up:   Discharge diet: Regular   Discharge activity: Activity as tolerated - but no driving until neurology evaluation    Discharge follow-up: Follow-up for EEG tomorrow  Follow-up with neurology on 4/13  Follow-up with hematology on 5/5 to discuss your prothrombin mutation and long-term recommendations for that.    Follow-up with the ophthalmologist in 1-2 weeks to reassess eyelid as per their recommendations.    No driving until assessment by neurology, then per their recommendations            Discharge Disposition:   Discharged to home      Attestation:  I have reviewed today's vital signs, notes, medications, labs and imaging.  Amount of time performed on this discharge summary: 50 minutes.    Ismael Romero MD, MD

## 2017-04-05 NOTE — DISCHARGE INSTRUCTIONS
Follow-up for EEG tomorrow  Follow-up with neurology on 4/13  Follow-up with hematology on 5/5 to discuss your prothrombin mutation and long-term recommendations for that.    Follow-up with the ophthalmologist in 1-2 weeks to reassess eyelid as per their recommendations.    No driving until assessment by neurology, then per their recommendations

## 2017-04-06 ENCOUNTER — HOSPITAL ENCOUNTER (OUTPATIENT)
Dept: NEUROLOGY | Facility: CLINIC | Age: 42
Discharge: HOME | End: 2017-04-06
Attending: FAMILY MEDICINE | Admitting: FAMILY MEDICINE
Payer: COMMERCIAL

## 2017-04-06 PROCEDURE — 95819 EEG AWAKE AND ASLEEP: CPT | Mod: 26 | Performed by: PSYCHIATRY & NEUROLOGY

## 2017-04-06 PROCEDURE — 95816 EEG AWAKE AND DROWSY: CPT

## 2017-04-07 NOTE — PROCEDURES
OUTPATIENT ROUTINE EEG REPORT.    Patient Name:  Doretha Fernandez  MRN:     [id number]  : `    1975    EEG#: n/a.        DATE OF RECORDIN2017.        DURATION OF RECORDIN minutes and 58 seconds.        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  This study was ordered to evaluate for seizures and epileptiform abnormalities.  On the day of the study, the patient was reported to take Keppra 250 mg twice daily amongst other listed in medical record medications.    CURRENT MEDICATIONS:    Current Outpatient Prescriptions:      traMADol (ULTRAM) 50 MG tablet, Take 1-2 tablets ( mg) by mouth every 6 hours as needed, Disp: 80 tablet, Rfl: 0     aspirin EC 81 MG EC tablet, Take 1 tablet (81 mg) by mouth daily, Disp: 30 tablet, Rfl: 1     levETIRAcetam (KEPPRA) 250 MG tablet, Take 1 tablet (250 mg) by mouth 2 times daily, Disp: 60 tablet, Rfl: 0     cyclobenzaprine (FLEXERIL) 10 MG tablet, Take 1 tablet (10 mg) by mouth 3 times daily as needed for muscle spasms, Disp: 42 tablet, Rfl: 0     calcium carbonate (TUMS) 500 MG chewable tablet, Take 1 chew tab by mouth daily as needed for heartburn, Disp: , Rfl:      FLUoxetine (PROZAC) 20 MG capsule, Take 40 mg by mouth daily 16 -- reduced to 40 mg daily, Disp: 270 capsule, Rfl: 01     LORazepam (ATIVAN) 1 MG tablet, Take 1 tablet (1 mg) by mouth every 8 hours as needed for anxiety, Disp: 30 tablet, Rfl: 0     Acetaminophen (TYLENOL PO), Take 1,000 mg by mouth every 6 hours as needed for mild pain or fever, Disp: , Rfl:      fluticasone (FLONASE) 50 MCG/ACT nasal spray, Spray 2 sprays into both nostrils daily, Disp: 1 g, Rfl: 3      TECHNICAL SUMMARY:  This outpatient routine EEG monitoring procedure was performed  with 19 scalp electrodes in 10-20 system placements, and additional scalp, precordial, and other surface electrodes used for electrical referencing and artifact detection.  Video monitoring was not utilized.          INTERICTAL EEG ACTIVITIES:  During maximal waking, background consists of symmetric, moderate amplitude 10.5 Hz posterior dominant rhythm, which is not well formed but reactive to eye opening. Lower amplitude frequencies are noted over the frontal fields creating an appropriate anterior-posterior amplitude-frequency gradient. There is no diffuse or focal slowing during waking seen.  Drowsiness is manifested by predominance of centrally maximum semirhythmic theta slowing and dropout of posterior dominant rhythm.  Brief occurrence of stage II sleep was characterized by symmetric sleep spindles and vertex waves.  Intermittent photic stimulation and hyperventilation were performed and did not induce any epileptiform abnormalities.        INTERICTAL EPILEPTIFORM DISCHARGES: None.        ICTAL RECORDINGS: No electrographic or clinical seizures and no paroxysmal behavioral events were recorded during this study.        IMPRESSION:  This outpatient routine EEG study is normal during wakefulness and sleep without any interictal epileptiform discharges, electrographic or clinical seizures, and paroxysmal behavioral events. A normal EEG does not rule out the diagnosis of epilepsy.   Clinical correlation is recommended.  Ananth Cowan MD  Neurology

## 2017-04-10 ENCOUNTER — TELEPHONE (OUTPATIENT)
Dept: FAMILY MEDICINE | Facility: CLINIC | Age: 42
End: 2017-04-10

## 2017-04-10 NOTE — TELEPHONE ENCOUNTER
"ED/Discharge Protocol    \"Hi, my name is Andra Ramos, a registered nurse, and I am calling on behalf of 's office at [facility].  I am calling to follow up and see how things are going for you after your recent visit.\"    \"I see that you were in the ER for L upper eye lid lesion.   How are you doing now that you are home?\" LM to call clinic/RN with questions/concerns. Pthas completed EEG, Neuro appt on 4/13/17, Heme appt on 5/5/17 and Ophth in 1-2 weeks.KpavelRN        "

## 2017-04-10 NOTE — TELEPHONE ENCOUNTER
ED/UC/IP follow up phone call:    RN please call to follow up.    Number of ED visits in past 12 months = 3

## 2017-04-13 ENCOUNTER — OFFICE VISIT (OUTPATIENT)
Dept: NEUROLOGY | Facility: CLINIC | Age: 42
End: 2017-04-13
Payer: COMMERCIAL

## 2017-04-13 VITALS — SYSTOLIC BLOOD PRESSURE: 104 MMHG | DIASTOLIC BLOOD PRESSURE: 75 MMHG | HEART RATE: 80 BPM | TEMPERATURE: 98.4 F

## 2017-04-13 DIAGNOSIS — R51.9 HEADACHE, UNSPECIFIED HEADACHE TYPE: Primary | ICD-10-CM

## 2017-04-13 DIAGNOSIS — R44.9 LEFT-SIDED SENSORY DEFICIT PRESENT: ICD-10-CM

## 2017-04-13 PROCEDURE — 99215 OFFICE O/P EST HI 40 MIN: CPT | Performed by: PSYCHIATRY & NEUROLOGY

## 2017-04-13 RX ORDER — IOPAMIDOL 755 MG/ML
70 INJECTION, SOLUTION INTRAVASCULAR ONCE
Status: COMPLETED | OUTPATIENT
Start: 2017-04-14 | End: 2017-04-14

## 2017-04-13 RX ORDER — PREDNISONE 20 MG/1
20 TABLET ORAL DAILY
Qty: 5 TABLET | Refills: 0 | Status: SHIPPED | OUTPATIENT
Start: 2017-04-13 | End: 2017-04-24

## 2017-04-13 NOTE — NURSING NOTE
"Chief Complaint   Patient presents with     Consult     History of CVA/ spells.  Referraly by hospitalist.       Initial /75 (BP Location: Right arm, Patient Position: Chair, Cuff Size: Adult Large)  Pulse 80  Temp 98.4  F (36.9  C) (Oral)  LMP 04/06/2017 (Approximate)  Breastfeeding? No Estimated body mass index is 36.51 kg/(m^2) as calculated from the following:    Height as of 4/1/17: 5' 3\" (1.6 m).    Weight as of 4/1/17: 206 lb 2.1 oz (93.5 kg).  Medication Reconciliation: complete    Patient prefers to be contacted:phone and [employer] [phone]  Okay to leave detailed message on voicemail: yes    Sarah SALDAÑA-CMA    "

## 2017-04-13 NOTE — MR AVS SNAPSHOT
After Visit Summary   4/13/2017    Doretha Fernandez    MRN: [id number]           Patient Information     Date Of Birth          1975        Visit Information        Provider Department      4/13/2017 12:45 PM Diana Johnson MD [facility]        Today's Diagnoses     Headache, unspecified headache type    -  1    Left-sided sensory deficit present          Care Instructions    Plan:    Stop the Keppra.  Prednisone 20mg daily for 5 days (take in the morning with food).  Let me know how you are feeling on Monday.   Continue your other medications as previously directed though I would recommend trying to wean off of the Ultram (Can cause medication overuse headache).  CTA head and Neck (vessel imaging). We will notify you of the results.   Check to see if you can get into Ophthalmology sooner. I would not consider returning to work until you see them.   Follow-up as planned with Hematology.  Okay to drive.   Return to clinic in 3 months.         Follow-ups after your visit        Your next 10 appointments already scheduled     May 05, 2017  2:00 PM CDT   New Visit with Kathy Richards MD   [facility] ([facility])    [facility]  [address]  [place] [address]   [phone]              Future tests that were ordered for you today     Open Future Orders        Priority Expected Expires Ordered    CT Head Neck Angio w/o & w Contrast Routine  4/13/2018 4/13/2017            Who to contact     If you have questions or need follow up information about today's clinic visit or your schedule please contact [facility] directly at [phone].  Normal or non-critical lab and imaging results will be communicated to you by MyChart, letter or phone within 4 business days after the clinic has received the results. If you do not hear from us within 7 days, please contact the clinic through MyChart or phone. If you have a  critical or abnormal lab result, we will notify you by phone as soon as possible.  Submit refill requests through [facility] or call your pharmacy and they will forward the refill request to us. Please allow 3 business days for your refill to be completed.          Additional Information About Your Visit        [employer] Information     [facility] gives you secure access to your electronic health record. If you see a primary care provider, you can also send messages to your care team and make appointments. If you have questions, please call your primary care clinic.  If you do not have a primary care provider, please call [phone] and they will assist you.        Care EveryWhere ID     This is your Care EveryWhere ID. This could be used by other organizations to access your [place] medical records  [id number]        Your Vitals Were     Pulse Temperature Last Period Breastfeeding?          80 98.4  F (36.9  C) (Oral) 04/06/2017 (Approximate) No         Blood Pressure from Last 3 Encounters:   04/13/17 104/75   04/05/17 123/71   03/27/17 102/73    Weight from Last 3 Encounters:   04/01/17 206 lb 2.1 oz (93.5 kg)   03/27/17 204 lb (92.5 kg)   12/16/16 202 lb (91.6 kg)                 Today's Medication Changes          These changes are accurate as of: 4/13/17  1:35 PM.  If you have any questions, ask your nurse or doctor.               Start taking these medicines.        Dose/Directions    predniSONE 20 MG tablet   Commonly known as:  DELTASONE   Used for:  Headache, unspecified headache type   Started by:  Diana Johnson MD        Dose:  20 mg   Take 1 tablet (20 mg) by mouth daily   Quantity:  5 tablet   Refills:  0         These medicines have changed or have updated prescriptions.        Dose/Directions    fluticasone 50 MCG/ACT spray   Commonly known as:  FLONASE   This may have changed:    - when to take this  - reasons to take this   Used for:  Seasonal allergic rhinitis        Dose:  2 spray   Spray 2  sprays into both nostrils daily   Quantity:  1 g   Refills:  3            Where to get your medicines      These medications were sent to [facility] [place] - [place], [place] - [address]  [address] [address]     Phone:  [phone]     predniSONE 20 MG tablet                Primary Care Provider Office Phone # Fax #    Anna Naidu [phone]       [facility] [address]  [facility] [address]        Thank you!     Thank you for choosing [facility]  for your care. Our goal is always to provide you with excellent care. Hearing back from our patients is one way we can continue to improve our services. Please take a few minutes to complete the written survey that you may receive in the mail after your visit with us. Thank you!             Your Updated Medication List - Protect others around you: Learn how to safely use, store and throw away your medicines at www.disposemymeds.org.          This list is accurate as of: 4/13/17  1:35 PM.  Always use your most recent med list.                   Brand Name Dispense Instructions for use    aspirin 81 MG EC tablet     30 tablet    Take 1 tablet (81 mg) by mouth daily       calcium carbonate 500 MG chewable tablet    TUMS     Take 1 chew tab by mouth daily as needed for heartburn       cyclobenzaprine 10 MG tablet    FLEXERIL    42 tablet    Take 1 tablet (10 mg) by mouth 3 times daily as needed for muscle spasms       FLUoxetine 20 MG capsule    PROzac    270 capsule    Take 40 mg by mouth daily 12/6/16 -- reduced to 40 mg daily       fluticasone 50 MCG/ACT spray    FLONASE    1 g    Spray 2 sprays into both nostrils daily       levETIRAcetam 250 MG tablet    KEPPRA    60 tablet    Take 1 tablet (250 mg) by mouth 2 times daily       LORazepam 1 MG tablet    ATIVAN    30 tablet    Take 1 tablet (1 mg) by mouth every 8 hours as needed for anxiety       predniSONE 20 MG tablet    DELTASONE    5 tablet     Take 1 tablet (20 mg) by mouth daily       traMADol 50 MG tablet    ULTRAM    80 tablet    Take 1-2 tablets ( mg) by mouth every 6 hours as needed       TYLENOL PO      Take 1,000 mg by mouth every 6 hours as needed for mild pain or fever

## 2017-04-13 NOTE — PATIENT INSTRUCTIONS
Plan:    Stop the Keppra.  Prednisone 20mg daily for 5 days (take in the morning with food).  Let me know how you are feeling on Monday.   Continue your other medications as previously directed though I would recommend trying to wean off of the Ultram (Can cause medication overuse headache).  CTA head and Neck (vessel imaging). We will notify you of the results.   Check to see if you can get into Ophthalmology sooner. I would not consider returning to work until you see them.   Follow-up as planned with Hematology.  Okay to drive.   Return to clinic in 3 months.

## 2017-04-13 NOTE — PROGRESS NOTES
INITIAL NEUROLOGY CONSULTATION    DATE OF VISIT: 4/13/2017  MRN: [id number]  PATIENT NAME: Doretha Fernandez  YOB: 1975    REFERRING PROVIDER: No ref. provider found    No chief complaint on file.      SUBJECTIVE:                                                      HPI:  Doretha Fernandez is a 42 year old female who presents for Consultation at the request of Dr. Romero for possible seizures. The patient was recently hospitalized with headache. The patient presented on 4.1.16 with Left-sided headache, visual changes and history of transient sensory changes on the Left the day prior. MRI was completed upon presentation and found to be consistent with an old stroke, but otherwise unremarkable.   The patient has a background history significant for right parietal lobe stroke during labor and delivery 18 years ago. The symptoms at that time were reportedly visual changes and left-sided weakness. Work-up revealed prothrombin deficiency. The patient was sick about one month prior to admission and stopped her daily aspirin at that time.   Stroke neurology was consulted via phone and they reviewed the MRI. They felt that symptoms were possibly due to a partial seizure rather than a vascular cause. They recommended Keppra and EEG as outpatient. The patient s EEG on 4.6 was normal. Neurologic exam was reported as normal and the patient was also seen by ophthalmology which revealed an unremarkable exam. The patient was discharged 4.5 on Ultram and Flexeril for the headache. Plan was to follow-up in my clinic and then also in hematology regarding the prothrombin mutation.   The patient was seen twice by Dr Carlin in the past. First in 2008 in the setting of some sensory symptoms on the Left and correlating findings of the old stroke. She then saw him in 2014 for transient visual changes which he thought may have been attributable to a migrainous phenomenon. MRI brain at that time was unchanged from prior studies.     Today  the patient is accompanied by her mother in clinic today. I review the above details with the patient. When asked outright about her symptoms and history she has great difficulty providing details. She says she just hurts all the time. I get the sense (as I go through her chart) that the symptoms she is experiencing currently are similar to previous symptoms. She does clarify that the headache she had back in December (prompting imaging) was occipital and different than the current headache. She has Left-sided pain and paresthesias. She does note recent discharge from the Left eye and plans to see ophthalmology next week. She was otherwise not having runny nose or tearing of the eyes with her pain. She does endorse some neck pain and today a  Band-like pressure around the head. She says that everything started when she gave birth to her child in 2004 and quickly thereafter had the Left-sided sensory changes and visual changes that eventually led to her stroke diagnosis.     She endorses poor balance. She did have a viral upper respiratory illness about a week prior to presentation to the hospital. I cannot tell from the patient's history what her baseline is.     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      Lumbago     left lower back pain     Prothrombin deficiency (H)     takes 81mg asa daily     Stroke (cerebrum) (H) 2000    during labor, difficult      TIA (transient ischemic attack) 2004     No past surgical history on file.      Current Outpatient Prescriptions on File Prior to Visit:  traMADol (ULTRAM) 50 MG tablet Take 1-2 tablets ( mg) by mouth every 6 hours as needed   aspirin EC 81 MG EC tablet Take 1 tablet (81 mg) by mouth daily   levETIRAcetam (KEPPRA) 250 MG tablet Take 1 tablet (250 mg) by mouth 2 times daily   cyclobenzaprine (FLEXERIL) 10 MG tablet Take 1 tablet (10 mg) by mouth 3 times daily as needed for muscle  spasms   calcium carbonate (TUMS) 500 MG chewable tablet Take 1 chew tab by mouth daily as needed for heartburn   FLUoxetine (PROZAC) 20 MG capsule Take 40 mg by mouth daily 12/6/16 -- reduced to 40 mg daily   LORazepam (ATIVAN) 1 MG tablet Take 1 tablet (1 mg) by mouth every 8 hours as needed for anxiety   Acetaminophen (TYLENOL PO) Take 1,000 mg by mouth every 6 hours as needed for mild pain or fever   fluticasone (FLONASE) 50 MCG/ACT nasal spray Spray 2 sprays into both nostrils daily     No current facility-administered medications on file prior to visit.   Allergies   Allergen Reactions     Erythrocin Nausea and Vomiting     Zithromax [Azithromycin Dihydrate] Diarrhea        Problem (# of Occurrences) Relation (Name,Age of Onset)    Arthritis (1) Maternal Grandmother    Asthma (1) Daughter (1)    Blood Disease (3) Maternal Grandmother, Maternal Grandfather, Paternal Grandfather    CANCER (3) Mother (45): lung, Maternal Grandmother, Paternal Grandmother    CEREBROVASCULAR DISEASE (1) Maternal Grandfather    Cancer - colorectal (1) Paternal Grandmother    DIABETES (2) Maternal Grandmother, Maternal Grandfather    Depression (3) Father, Maternal Grandmother, Sister    GASTROINTESTINAL DISEASE (1) Father    HEART DISEASE (2) Maternal Grandfather, Daughter (1)    Lipids (1) Father    Neurologic Disorder (1) Mother    Respiratory (1) Paternal Grandfather        Social History   Substance Use Topics     Smoking status: Passive Smoke Exposure - Never Smoker     Last attempt to quit: 3/20/1998     Smokeless tobacco: Never Used     Alcohol use Yes      Comment: occ       REVIEW OF SYSTEMS:                                                      10-point review of systems is negative except as mentioned above in HPI.     EXAM:                                                      Physical Exam:   Vitals: LMP 03/13/2017  BMI= There is no height or weight on file to calculate BMI.  GENERAL: NAD. Patient is tearful at times.  "  HEENT: Left sclera is erythematous. No TTP of neck or scalp.  Neurologic:  MENTAL STATUS: Alert, attentive. Speech is fluent. Normal comprehension. Normal concentration. Adequate fund of knowledge.   CRANIAL NERVES: Discs flat. Visual fields intact to confrontation. Pupils equally, round and reactive to light. Facial sensation decreased on the Left compared to the Right. Facial movement normal. EOM full. Hearing intact to conversation. Trapezius strength intact. Palate moves symmetrically. Tongue midline.  MOTOR: 5/5 in proximal and distal muscle groups of upper and lower extremities. Tone and bulk normal.   DTRs: Symmetric. Brisk at patellae. Babinski down-going bilaterally.   SENSATION: Normal light touch and pinprick on Right, diffusely decreased (\"different\") on Left. Intact proprioception. Vibration: Normal at both ankles.   COORDINATION: Normal finger nose finger. Finger tapping normal. Knee heel shin normal.  STATION AND GAIT: Romberg negative. Tandem normal.  CV: RRR. S1, S2.   NECK: No bruits.    Relevant Data:  MRI Brain (4.1.17):  IMPRESSION:  1. Stable area of abnormal T2 signal hyperintensity involving the  cortex and subcortical white matter at the posterior pole of the right  parietal lobe with differential as above. Seizure activity arising  from this area of abnormal signal could be responsible for the  patient's symptoms.  2. Otherwise, normal brain MRI. No evidence for acute intracranial  pathology.  MRA Head and Neck (12.1.16):  IMPRESSION: Negative MR angiography of the [place] of Blood.  IMPRESSION: Normal MR angiogram of the neck. No change.  EEG (4.6.17):  IMPRESSION:  This outpatient routine EEG study is normal during   wakefulness and sleep without any interictal epileptiform   discharges, electrographic or clinical seizures, and paroxysmal   behavioral events. A normal EEG does not rule out the diagnosis   of epilepsy.    ASSESSMENT and PLAN:                                               "        Assessment and Plan:   No diagnosis found.     Ms. Fernandez is a 43 yo woman with history of stroke in the setting of childbirth and prothrombin deficiency with multiple spells of Left sided sensory changes and visual changes over the past 14 years. The current clinical picture could be representative of migrainous phenomenon. There is also the possibility that this is exacerbation of her stroke deficits in the setting of prolonged headache. After talking to the patient and reviewing the electroencephalogram, my suspicion for seizure is low (again, especially due to the timeline). The timeline also makes TIA low on the differential though one can see fluctuating symptoms with vascular stenosis. I would like to repeat vessel imaging for Doretha. In the meantime I suggested we try a short course of prednisone. I realize that this was used while the patient was inpatient as well, but I think a retrial is reasonable before we try other medications more troublesome side effect profiles. The patient understands and agrees with the plan.     I should mention that I am considering mood to be an issue as well. She appears to be somewhat depressed, though that could be due to her pain. I will defer work-up to the patient's primary care provider. I did recommend that she make an appointment with Dr. Naidu as well.     Patient Instructions:  Stop the Keppra.  Prednisone 20mg daily for 5 days (take in the morning with food).  Let me know how you are feeling on Monday.   Continue your other medications as previously directed though I would recommend trying to wean off of the Ultram (Can cause medication overuse headache).  CTA head and Neck (vessel imaging). We will notify you of the results.   Check to see if you can get into Ophthalmology sooner. I would not consider returning to work until you see them.   Follow-up as planned with Hematology.  Okay to drive.   Return to clinic in 3 months.    Total Time: >40 minutes were  spent with the patient. More than 50% of the time spent on counseling (as described above in Assessment and Plan) /coordinating the care.    Diana Johnson MD  Neurology    CC: Anna Naidu MD

## 2017-04-14 ENCOUNTER — HOSPITAL ENCOUNTER (OUTPATIENT)
Dept: CT IMAGING | Facility: CLINIC | Age: 42
Discharge: HOME | End: 2017-04-14
Attending: PSYCHIATRY & NEUROLOGY | Admitting: PSYCHIATRY & NEUROLOGY
Payer: COMMERCIAL

## 2017-04-14 DIAGNOSIS — R51.9 HEADACHE, UNSPECIFIED HEADACHE TYPE: ICD-10-CM

## 2017-04-14 DIAGNOSIS — R44.9 LEFT-SIDED SENSORY DEFICIT PRESENT: ICD-10-CM

## 2017-04-14 PROCEDURE — 70498 CT ANGIOGRAPHY NECK: CPT

## 2017-04-14 PROCEDURE — 25500064 ZZH RX 255 OP 636: Performed by: PSYCHIATRY & NEUROLOGY

## 2017-04-14 PROCEDURE — 25000125 ZZHC RX 250: Performed by: PSYCHIATRY & NEUROLOGY

## 2017-04-14 RX ADMIN — SODIUM CHLORIDE 100 ML: 9 INJECTION, SOLUTION INTRAVENOUS at 15:17

## 2017-04-14 RX ADMIN — IOPAMIDOL 70 ML: 755 INJECTION, SOLUTION INTRAVENOUS at 15:17

## 2017-04-17 ENCOUNTER — TELEPHONE (OUTPATIENT)
Dept: NEUROLOGY | Facility: CLINIC | Age: 42
End: 2017-04-17

## 2017-04-17 DIAGNOSIS — G43.009 MIGRAINE WITHOUT AURA AND WITHOUT STATUS MIGRAINOSUS, NOT INTRACTABLE: Primary | ICD-10-CM

## 2017-04-17 NOTE — TELEPHONE ENCOUNTER
She is probably done with the current prednisone course? We can either extend it with a taper or give it a few days off of the steroid and see how she feels.    Thanks,  CY

## 2017-04-17 NOTE — TELEPHONE ENCOUNTER
Reason for Call:  Other results    Detailed comments: Pt wants results of brain scan done last week, please call    Phone Number Patient can be reached at: Home number on file [phone] (home)    Best Time: today    Can we leave a detailed message on this number? YES    Call taken on 4/17/2017 at 11:24 AM by Eva Duran

## 2017-04-17 NOTE — TELEPHONE ENCOUNTER
The vessel imaging we did is normal. We did not do a brain scan (MRI had been done a couple weeks ago).     CY

## 2017-04-17 NOTE — TELEPHONE ENCOUNTER
Since headache hasn't completely gone, she would like to try to extend it.  She'd like it to go to [facility] this time.  Thank you.

## 2017-04-17 NOTE — TELEPHONE ENCOUNTER
I advised Doretha of the normal CTA.  The prednisone is helping some, but the headache is not completely gone, especially if she concentrates hard or with reading.  Wondering what the next step will be.

## 2017-04-18 RX ORDER — METHYLPREDNISOLONE 4 MG
TABLET, DOSE PACK ORAL
Qty: 21 TABLET | Refills: 0 | Status: SHIPPED | OUTPATIENT
Start: 2017-04-18 | End: 2017-04-24

## 2017-04-20 ENCOUNTER — TELEPHONE (OUTPATIENT)
Dept: NEUROLOGY | Facility: CLINIC | Age: 42
End: 2017-04-20

## 2017-04-20 ENCOUNTER — TELEPHONE (OUTPATIENT)
Dept: FAMILY MEDICINE | Facility: CLINIC | Age: 42
End: 2017-04-20

## 2017-04-20 DIAGNOSIS — H57.9 EYE PROBLEM: Primary | ICD-10-CM

## 2017-04-20 NOTE — TELEPHONE ENCOUNTER
Well, I hate to defer this but I think that Doretha would be best served through urgent care or the ED. She may need something stronger for headache than what we can provide orally for her current pain, plus I would like to have someone look at the eye if she has discharge. She was doing better from a pain standpoint with the prednisone when we checked in last week...    Please relay this message to the RN    Diana Johnson

## 2017-04-20 NOTE — TELEPHONE ENCOUNTER
Yanet MARR calling from [facility] stating pain meds are not working for her headaches. She would like to know if there is anything she can do instead of what she has currently. Would like to get this addressed today.   Would also like to have her results addressed from CT scan.     Please call pt     Jovanna Henry  Specialty CSS

## 2017-04-20 NOTE — TELEPHONE ENCOUNTER
Patient notified  Patient would like a new Opthalmology Referral, she does not want to go back to [facility], she is very dissatisfied with their care.  Patient will call clinic back with the eye clinic she would like to go to and a fax number  Advised patient to go to ED if she continues the head and eye pain, symptoms worsen, loss of consciousness, light headed, faint, numbness or tingling  Patient verbalized understanding    Yanet MOLINA Rn

## 2017-04-20 NOTE — TELEPHONE ENCOUNTER
Patient calling frustrated and crying  Patient reports:  Her face on the left side feels funny. It feels like pressure on her head and  around left eye, it is hard to describe, it is not tingly, it does not feel normal, numb like.  Headache consistent and sharp.  It feels like her head is being squeezed and her left eyeball is being squeezed.  The pain moves from the back of her head to the front of her head consistently, on the left side only.   Her left eye is oozing yellow fluid.  When she wakes up her eye is crusted over.  She has vision problems out of that eye, she does not see the same as 3 weeks ago.  She was supposed to have eye appt today at [facility] for her eye, appt not scheduled, [facility]'s error  Pain in head and eye 6/10 with Prednisone and Flexeril, she says it is not helping the pain  She wants to get rid of the headache and eye pain  Patient wants this address today  Please advise patient     Routing to provider.    Yanet MOLINA Rn

## 2017-04-20 NOTE — TELEPHONE ENCOUNTER
Reason for call:  Patient reporting a symptom    Symptom or request: Pt calling crying in pain - Headache and draining eyes.  Just came from [facility] and they messed up her appt and cannot help her today.  Tx to RN     Duration (how long have symptoms been present): ongoing    Have you been treated for this before? Yes    Additional comments:     Phone Number patient can be reached at:  Cell number on file:    Telephone Information:   Mobile [phone]       Best Time:  any    Can we leave a detailed message on this number:  YES    Call taken on 4/20/2017 at 1:01 PM by Jena Quintero

## 2017-04-21 ENCOUNTER — TELEPHONE (OUTPATIENT)
Dept: FAMILY MEDICINE | Facility: CLINIC | Age: 42
End: 2017-04-21

## 2017-04-21 NOTE — TELEPHONE ENCOUNTER
I am more than happy to provide a new ophthalmology referral. Would she prefer the [facility] or a different [place] clinic? Also, I have been thinking about other options for her headache. Again, it is difficult to treat acute pain in the non-urgent setting, but we could try a preventative medication if the headache continues. I agree that she should consider ED evaluation if the pain is severe, she has new symptoms, etc. Please let me know what the patient prefers.    Thank you,  Diana Johnson

## 2017-04-21 NOTE — TELEPHONE ENCOUNTER
**When finished: Please make a copy for station  and a copy to be sent to scanning. Send originals with patient.     Date received: April 21, 2017   Doctor: Anna Naidu MD  Daytime phone number: [phone]  : Doretha    n: Mail back to patient  n: Mail or fax to destination requesting form  y: Patient to     Other notes: Pt has appt 4/24

## 2017-04-24 ENCOUNTER — OFFICE VISIT (OUTPATIENT)
Dept: FAMILY MEDICINE | Facility: CLINIC | Age: 42
End: 2017-04-24
Payer: COMMERCIAL

## 2017-04-24 VITALS
HEIGHT: 63 IN | HEART RATE: 84 BPM | SYSTOLIC BLOOD PRESSURE: 117 MMHG | RESPIRATION RATE: 18 BRPM | DIASTOLIC BLOOD PRESSURE: 81 MMHG | BODY MASS INDEX: 37.39 KG/M2 | WEIGHT: 211 LBS

## 2017-04-24 DIAGNOSIS — M54.81 OCCIPITAL NEURALGIA OF LEFT SIDE: Primary | ICD-10-CM

## 2017-04-24 PROCEDURE — 99214 OFFICE O/P EST MOD 30 MIN: CPT | Performed by: FAMILY MEDICINE

## 2017-04-24 NOTE — TELEPHONE ENCOUNTER
Called and spoke to patient, informed that referral has been faxed to [facility] as requested. Patient voiced understanding.     Kailee Davison MA

## 2017-04-24 NOTE — PATIENT INSTRUCTIONS
Thank you for choosing [facility].  You may be receiving a survey in the mail from [facility] regarding your visit today.  Please take a few minutes to complete and return the survey to let us know how we are doing.      Our Clinic hours are:  Mondays    7:20 am - 7 pm  Tues -  Fri  7:20 am - 5 pm    Clinic Phone: [phone]    The clinic lab opens at 7:30 am Mon - Fri and appointments are required.    [facility]. [phone]  Monday-Thursday 8 am - 7pm  Tues/Wed/Fri 8 am - 5:30 pm

## 2017-04-24 NOTE — PROGRESS NOTES
"  SUBJECTIVE:                                                    Doretha Fernandez is a 42 year old female who presents to clinic today for the following health issues:          Hospital Follow-up Visit:    Hospital/Nursing Home/IP Rehab Facility: [facility]  Date of Admission: 4/1/17  Date of Discharge: 4/5/17  Reason(s) for Admission: vision changes            Problems taking medications regularly:  None       Medication changes since discharge: None       Problems adhering to non-medication therapy:  None    Summary of hospitalization:  [facility] discharge summary reviewed  Diagnostic Tests/Treatments reviewed.  Follow up needed: neurology  Other Healthcare Providers Involved in Patient s Care:         None  Update since discharge: Patient still has a headache 3/10 that has not gone away. Patient is swollen and the prednisone is making her feel shaky.     Post Discharge Medication Reconciliation: discharge medications reconciled, continue medications without change.  Plan of care communicated with patient     Coding guidelines for this visit:  Type of Medical   Decision Making Face-to-Face Visit       within 7 Days of discharge Face-to-Face Visit        within 14 days of discharge   Moderate Complexity 35587 11599   High Complexity 67137 [id number]          Hospital chart, neurology consultation, labs and imaging all reviewed.     Patient is frustrated by the ongoing pain on the left side of her head with a \"pressure\" that starts over the left occiput and wraps around to the left eye and maxillary area.  She denies stabbing pain.   She has also been doctoring for a left eye drainage and has [facility].  There was some confusion with her appointment at [facility] and she has chosen to change groups.     She is tearful and in pain.  She is wanting to be released back to work but admits that she would not be able to function fully with the amount of pain she is " "in .      /81  Pulse 84  Resp 18  Ht 5' 3\" (1.6 m)  Wt 211 lb (95.7 kg)  LMP 04/06/2017 (Approximate)  Breastfeeding? No  BMI 37.38 kg/m2  EXAM: GENERAL APPEARANCE: Alert, no acute distress  RESP: lungs clear to auscultation   CV: normal rate, regular rhythm, no murmur or gallop  ABDOMEN: soft, no organomegaly, masses or tenderness  NEURO: Alert, oriented, speech and mentation normal, Cranial nerves 2-12 are normal., tearful.  Head: exquisitely tender to palpation over left occiput    ASSESSMENT/PLAN:      ICD-10-CM    1. Occipital neuralgia of left side M54.81 [facility] ([place]) REFERRAL   intractable headache.  Doesn't really fit a clear picture for trigeminal neuralgia but possibly occipital neuralgia with then subsequent tension type headache.      Will have her try an occipital nerve block with our interventional pain specialists.     Consider gabapentin or amitriptyline for pain if not successful, another option would be depakote.    Patient is anxious to go back to work, however I don't feel she'd be able to work effectively in the amount of pain she's in.  FLMA and disability paperwork completed.  Return to work tentative set at 5/1 but this may be optimistic.     Anna Naidu M.D.      Patient Instructions         Thank you for choosing [facility].  You may be receiving a survey in the mail from [facility] regarding your visit today.  Please take a few minutes to complete and return the survey to let us know how we are doing.      Our Clinic hours are:  Mondays    7:20 am - 7 pm  Tues -  Fri  7:20 am - 5 pm    Clinic Phone: [phone]    The clinic lab opens at 7:30 am Mon - Fri and appointments are required.    [facility]. [phone]  Monday-Thursday 8 am - 7pm  Tues/Wed/Fri 8 am - 5:30 pm                 "

## 2017-04-24 NOTE — MR AVS SNAPSHOT
After Visit Summary   4/24/2017    Doretha Fernandez    MRN: [id number]           Patient Information     Date Of Birth          1975        Visit Information        Provider Department      4/24/2017 8:20 AM Anna Naidu MD [facility]        Today's Diagnoses     Occipital neuralgia of left side    -  1      Care Instructions          Thank you for choosing [facility].  You may be receiving a survey in the mail [facility] regarding your visit today.  Please take a few minutes to complete and return the survey to let us know how we are doing.      Our Clinic hours are:  Mondays    7:20 am - 7 pm  Tues -  Fri  7:20 am - 5 pm    Clinic Phone: [phone]    The clinic lab opens at 7:30 am Mon - Fri and appointments are required.    [facility]  Ph. [phone]  Monday-Thursday 8 am - 7pm  Tues/Wed/Fri 8 am - 5:30 pm               Follow-ups after your visit        Additional Services     [facility] ([place]) REFERRAL       Your provider has referred you to the [facility].    Reason for Referral: Procedure or injection only - patient will be contacted within 24 hrs to schedule: Block: Occipital Nerve Block    Please complete the following questions:    What is your diagnosis for the patient's pain? Occipital neuralgia    Do you have any specific questions for the pain specialist? No    Are there any red flags that may impact the assessment or management of the patient? None    **ANY DIAGNOSTIC TESTS THAT ARE NOT IN [facility] SHOULD BE SENT TO THE PAIN CENTER**    Please note the Pre-Op Pain Consult must be scheduled 2-3 weeks prior to the patient's surgery.  Patient's trying to schedule within 2 weeks of surgery may not be accommodated.     Pre-Op Pain Consults are only good for 30 days.    REGARDING OPIOID MEDICATIONS:  We will always address appropriateness of opioid pain medications, but we generally will not  automatically take on a prescribing role. When we do take on prescribing of opioids for chronic pain, it is in collaboration with the referring physician for an intermediate period of time (months), with an expectation that the primary physician or provider will assume the prescribing role if medications are effective at stable doses with demonstrated compliance.  Therefore, please do not assume that your prescribing responsibilities end on the day of pain clinic consultation.  Is this agreeable to you? YES    For any questions, contact the [facility] at [phone].    Please be aware that coverage of these services is subject to the terms and limitations of your health insurance plan.  Call member services at your health plan with any benefit or coverage questions.      Please bring the following with you to your appointment:    (1) Any X-Rays, CTs or MRIs which have been performed.  Contact the facility where they were done to arrange for  prior to your scheduled appointment.    (2) List of current medications   (3) This referral request   (4) Any documents/labs given to you for this referral                  Your next 10 appointments already scheduled     May 05, 2017  2:00 PM CDT   New Visit with Kathy Richards MD   [facility] ([facility])    [facility]  [address]  [facility] [address] [phone]              Who to contact     If you have questions or need follow up information about today's clinic visit or your schedule please contact [facility] directly at [phone].  Normal or non-critical lab and imaging results will be communicated to you by MyChart, letter or phone within 4 business days after the clinic has received the results. If you do not hear from us within 7 days, please contact the clinic through MyChart or phone. If you have a critical or abnormal lab result, we will notify  "you by phone as soon as possible.  Submit refill requests through [employer] or call your pharmacy and they will forward the refill request to us. Please allow 3 business days for your refill to be completed.          Additional Information About Your Visit        [employer] Information     [facility] gives you secure access to your electronic health record. If you see a primary care provider, you can also send messages to your care team and make appointments. If you have questions, please call your primary care clinic.  If you do not have a primary care provider, please call [phone] and they will assist you.        Care EveryWhere ID     This is your Care EveryWhere ID. This could be used by other organizations to access your [place] medical records  [id number]        Your Vitals Were     Pulse Respirations Height Last Period Breastfeeding? BMI (Body Mass Index)    84 18 5' 3\" (1.6 m) 04/06/2017 (Approximate) No 37.38 kg/m2       Blood Pressure from Last 3 Encounters:   04/24/17 117/81   04/13/17 104/75   04/05/17 123/71    Weight from Last 3 Encounters:   04/24/17 211 lb (95.7 kg)   04/01/17 206 lb 2.1 oz (93.5 kg)   03/27/17 204 lb (92.5 kg)              We Performed the Following     Asthma Action Plan (AAP)     [facility] C[facility] ([place]) REFERRAL          Today's Medication Changes          These changes are accurate as of: 4/24/17  8:47 AM.  If you have any questions, ask your nurse or doctor.               These medicines have changed or have updated prescriptions.        Dose/Directions    fluticasone 50 MCG/ACT spray   Commonly known as:  FLONASE   This may have changed:    - when to take this  - reasons to take this   Used for:  Seasonal allergic rhinitis        Dose:  2 spray   Spray 2 sprays into both nostrils daily   Quantity:  1 g   Refills:  3         Stop taking these medicines if you haven't already. Please contact your care team if you have questions.     levETIRAcetam 250 MG tablet "   Commonly known as:  KEPPRA   Stopped by:  Anna Naidu MD                    Primary Care Provider Office Phone # Fax #    Anna Naidu [phone]       [facility] [address]  [facility] [address]        Thank you!     Thank you for choosing [facility]note 3[facility]  for your care. Our goal is always to provide you with excellent care. Hearing back from our patients is one way we can continue to improve our services. Please take a few minutes to complete the written survey that you may receive in the mail after your visit with us. Thank you!             Your Updated Medication List - Protect others around you: Learn how to safely use, store and throw away your medicines at www.disposemymeds.org.          This list is accurate as of: 4/24/17  8:47 AM.  Always use your most recent med list.                   Brand Name Dispense Instructions for use    aspirin 81 MG EC tablet     30 tablet    Take 1 tablet (81 mg) by mouth daily       calcium carbonate 500 MG chewable tablet    TUMS     Take 1 chew tab by mouth daily as needed for heartburn       cyclobenzaprine 10 MG tablet    FLEXERIL    42 tablet    Take 1 tablet (10 mg) by mouth 3 times daily as needed for muscle spasms       FLUoxetine 20 MG capsule    PROzac    270 capsule    Take 40 mg by mouth daily 12/6/16 -- reduced to 40 mg daily       fluticasone 50 MCG/ACT spray    FLONASE    1 g    Spray 2 sprays into both nostrils daily       LORazepam 1 MG tablet    ATIVAN    30 tablet    Take 1 tablet (1 mg) by mouth every 8 hours as needed for anxiety       traMADol 50 MG tablet    ULTRAM    80 tablet    Take 1-2 tablets ( mg) by mouth every 6 hours as needed       TYLENOL PO      Take 1,000 mg by mouth every 6 hours as needed for mild pain or fever

## 2017-04-24 NOTE — NURSING NOTE
"Chief Complaint   Patient presents with     Hospital F/U       Initial /81  Pulse 84  Resp 18  Ht 5' 3\" (1.6 m)  Wt 211 lb (95.7 kg)  LMP 04/06/2017 (Approximate)  Breastfeeding? No  BMI 37.38 kg/m2 Estimated body mass index is 37.38 kg/(m^2) as calculated from the following:    Height as of this encounter: 5' 3\" (1.6 m).    Weight as of this encounter: 211 lb (95.7 kg).  Medication Reconciliation: complete    "

## 2017-04-27 ENCOUNTER — OFFICE VISIT (OUTPATIENT)
Dept: PALLIATIVE MEDICINE | Facility: CLINIC | Age: 42
End: 2017-04-27
Payer: COMMERCIAL

## 2017-04-27 VITALS — BODY MASS INDEX: 38.44 KG/M2 | WEIGHT: 217 LBS | SYSTOLIC BLOOD PRESSURE: 100 MMHG | DIASTOLIC BLOOD PRESSURE: 62 MMHG

## 2017-04-27 DIAGNOSIS — M54.81 OCCIPITAL NEURALGIA OF LEFT SIDE: Primary | ICD-10-CM

## 2017-04-27 PROCEDURE — 64450 NJX AA&/STRD OTHER PN/BRANCH: CPT | Mod: 59 | Performed by: PAIN MEDICINE

## 2017-04-27 PROCEDURE — 64405 NJX AA&/STRD GR OCPL NRV: CPT | Mod: LT | Performed by: PAIN MEDICINE

## 2017-04-27 ASSESSMENT — PAIN SCALES - GENERAL: PAINLEVEL: NO PAIN (0)

## 2017-04-27 NOTE — PATIENT INSTRUCTIONS
[facility]     1. Today you had:  Occipital nerve block, left        Medications used:  Kenalog, Lidocaine, Bupivacaine    2. Post-procedure instructions:      Monitor the injection sites for signs and symptoms of infection-fever, chills, redness, swelling, warmth, or drainage to areas.    You may have soreness at injection sites for a few days following your procedure.    You may apply ice to the painful areas to help minimize the discomfort of the needle pokes.    Do not apply heat to sites for at least 12 hours.    You may use anti-inflammatory medications or Tylenol for pain control if necessary    Go to the emergency room if you develop any signs of allergic reaction such as shortness of breath, or for other concerning/persistent symptoms    Call this number to schedule or change appointments: [phone]    Nurse triage line (Mon-Thurs 8 AM to 4 PM; Fri 8 AM to 12 PM): [phone]    After hours provider line: [phone]    You were seen in clinic only for injection. Please continue to follow up with your referring provider for clinical management of this issue.

## 2017-04-27 NOTE — MR AVS SNAPSHOT
After Visit Summary   4/27/2017    Doretha Fernandez    MRN: [id number]           Patient Information     Date Of Birth          1975        Visit Information        Provider Department      4/27/2017 10:30 AM Matilde Barrios MD [facility]        Care Instructions    [facility]     1. Today you had:  Occipital nerve block, left        Medications used:  Kenalog, Lidocaine, Bupivacaine    2. Post-procedure instructions:      Monitor the injection sites for signs and symptoms of infection-fever, chills, redness, swelling, warmth, or drainage to areas.    You may have soreness at injection sites for a few days following your procedure.    You may apply ice to the painful areas to help minimize the discomfort of the needle pokes.    Do not apply heat to sites for at least 12 hours.    You may use anti-inflammatory medications or Tylenol for pain control if necessary    Go to the emergency room if you develop any signs of allergic reaction such as shortness of breath, or for other concerning/persistent symptoms    Call this number to schedule or change appointments: [phone]    Nurse triage line (Mon-Thurs 8 AM to 4 PM; Fri 8 AM to 12 PM): [phone]    After hours provider line: [phone]    You were seen in clinic only for injection. Please continue to follow up with your referring provider for clinical management of this issue.            Follow-ups after your visit        Your next 10 appointments already scheduled     Apr 27, 2017 10:30 AM CDT   PROCEDURE with Matilde Barrios MD   [facility] ([facility])    [address]  [facility] [id number] [phone]            May 05, 2017  2:00 PM CDT   New Visit with Kathy Richards MD   [facility] ([facility])    [facility]  [address]  [facility] [address] [phone]              Who to  contact     If you have questions or need follow up information about today's clinic visit or your schedule please contact [facility] directly at [phone].  Normal or non-critical lab and imaging results will be communicated to you by [employer], letter or phone within 4 business days after the clinic has received the results. If you do not hear from us within 7 days, please contact the clinic through [employer] or phone. If you have a critical or abnormal lab result, we will notify you by phone as soon as possible.  Submit refill requests through [facility] or call your pharmacy and they will forward the refill request to us. Please allow 3 business days for your refill to be completed.          Additional Information About Your Visit        [employer] Information     [employer] gives you secure access to your electronic health record. If you see a primary care provider, you can also send messages to your care team and make appointments. If you have questions, please call your primary care clinic.  If you do not have a primary care provider, please call [phone] and they will assist you.        Care EveryWhere ID     This is your Care EveryWhere ID. This could be used by other organizations to access your [place] medical records  [id number]        Your Vitals Were     Last Period BMI (Body Mass Index)                04/06/2017 (Approximate) 38.44 kg/m2           Blood Pressure from Last 3 Encounters:   04/27/17 100/62   04/24/17 117/81   04/13/17 104/75    Weight from Last 3 Encounters:   04/27/17 98.4 kg (217 lb)   04/24/17 95.7 kg (211 lb)   04/01/17 93.5 kg (206 lb 2.1 oz)              Today, you had the following     No orders found for display         Today's Medication Changes          These changes are accurate as of: 4/27/17 10:20 AM.  If you have any questions, ask your nurse or doctor.               These medicines have changed or have updated prescriptions.        Dose/Directions     fluticasone 50 MCG/ACT spray   Commonly known as:  FLONASE   This may have changed:    - when to take this  - reasons to take this   Used for:  Seasonal allergic rhinitis        Dose:  2 spray   Spray 2 sprays into both nostrils daily   Quantity:  1 g   Refills:  3                Primary Care Provider Office Phone # Fax #    Anna Naidu [phone]       [facility] [address] [facility] [address]        Thank you!     Thank you for choosing [facility] [place]  for your care. Our goal is always to provide you with excellent care. Hearing back from our patients is one way we can continue to improve our services. Please take a few minutes to complete the written survey that you may receive in the mail after your visit with us. Thank you!             Your Updated Medication List - Protect others around you: Learn how to safely use, store and throw away your medicines at www.disposemymeds.org.          This list is accurate as of: 4/27/17 10:20 AM.  Always use your most recent med list.                   Brand Name Dispense Instructions for use    aspirin 81 MG EC tablet     30 tablet    Take 1 tablet (81 mg) by mouth daily       calcium carbonate 500 MG chewable tablet    TUMS     Take 1 chew tab by mouth daily as needed for heartburn       cyclobenzaprine 10 MG tablet    FLEXERIL    42 tablet    Take 1 tablet (10 mg) by mouth 3 times daily as needed for muscle spasms       FLUoxetine 20 MG capsule    PROzac    270 capsule    Take 40 mg by mouth daily 12/6/16 -- reduced to 40 mg daily       fluticasone 50 MCG/ACT spray    FLONASE    1 g    Spray 2 sprays into both nostrils daily       LORazepam 1 MG tablet    ATIVAN    30 tablet    Take 1 tablet (1 mg) by mouth every 8 hours as needed for anxiety       traMADol 50 MG tablet    ULTRAM    80 tablet    Take 1-2 tablets ( mg) by mouth every 6 hours as needed       TYLENOL PO      Take 1,000 mg by mouth every 6 hours as  needed for mild pain or fever

## 2017-05-01 ENCOUNTER — OFFICE VISIT (OUTPATIENT)
Dept: FAMILY MEDICINE | Facility: CLINIC | Age: 42
End: 2017-05-01
Payer: COMMERCIAL

## 2017-05-01 VITALS
RESPIRATION RATE: 18 BRPM | DIASTOLIC BLOOD PRESSURE: 80 MMHG | BODY MASS INDEX: 38.45 KG/M2 | HEIGHT: 63 IN | WEIGHT: 217 LBS | SYSTOLIC BLOOD PRESSURE: 116 MMHG | HEART RATE: 85 BPM

## 2017-05-01 DIAGNOSIS — G44.209 ACUTE NON INTRACTABLE TENSION-TYPE HEADACHE: Primary | ICD-10-CM

## 2017-05-01 PROCEDURE — 99214 OFFICE O/P EST MOD 30 MIN: CPT | Performed by: FAMILY MEDICINE

## 2017-05-01 RX ORDER — GABAPENTIN 300 MG/1
CAPSULE ORAL
Qty: 90 CAPSULE | Refills: 0 | Status: SHIPPED | OUTPATIENT
Start: 2017-05-01 | End: 2017-06-26

## 2017-05-01 ASSESSMENT — PAIN SCALES - GENERAL: PAINLEVEL: MODERATE PAIN (5)

## 2017-05-01 NOTE — NURSING NOTE
"Chief Complaint   Patient presents with     RECHECK     Patient is here to follow-up on pain clinic appt. Patient felt the injection helped up until yesterday and noticed the pressure being gone and now a different type of headache. Patient rates pain at a 5/10. Patient is still unable to do daily activities.        Initial /80  Pulse 85  Resp 18  Ht 5' 3\" (1.6 m)  Wt 217 lb (98.4 kg)  LMP 04/06/2017 (Approximate)  Breastfeeding? No  BMI 38.44 kg/m2 Estimated body mass index is 38.44 kg/(m^2) as calculated from the following:    Height as of this encounter: 5' 3\" (1.6 m).    Weight as of this encounter: 217 lb (98.4 kg).  Medication Reconciliation: complete    "

## 2017-05-01 NOTE — PATIENT INSTRUCTIONS
Start gabapentin/neurontin - follow directions.    Recheck with Dr. Johnson.       Anna Naidu M.D.        Thank you for choosing [facility].  You may be receiving a survey in the mail from [facility] regarding your visit today.  Please take a few minutes to complete and return the survey to let us know how we are doing.      Our Clinic hours are:  Mondays    7:20 am - 7 pm  Tues -  Fri  7:20 am - 5 pm    Clinic Phone: [phone]    The clinic lab opens at 7:30 am Mon - Fri and appointments are required.    [facility]. [phone]  Monday-Thursday 8 am - 7pm  Tues/Wed/Fri 8 am - 5:30 pm

## 2017-05-01 NOTE — MR AVS SNAPSHOT
After Visit Summary   5/1/2017    Doretha Fernandez    MRN: [id number]           Patient Information     Date Of Birth          1975        Visit Information        Provider Department      5/1/2017 9:40 AM Anna Naidu MD [facility]        Today's Diagnoses     Acute non intractable tension-type headache    -  1      Care Instructions    Start gabapentin/neurontin - follow directions.    Recheck with Dr. Johnson.       Anna Naidu M.D.        Thank you for choosing [facility].  You may be receiving a survey in the mail from [facility] regarding your visit today.  Please take a few minutes to complete and return the survey to let us know how we are doing.      Our Clinic hours are:  Mondays    7:20 am - 7 pm  Tues -  Fri  7:20 am - 5 pm    Clinic Phone: [phone]    The clinic lab opens at 7:30 am Mon - Fri and appointments are required.    [facility]  Ph. [phone]  Monday-Thursday 8 am - 7pm  Tues/Wed/Fri 8 am - 5:30 pm               Follow-ups after your visit        Your next 10 appointments already scheduled     May 05, 2017  2:00 PM CDT   New Visit with Kathy Richards MD   [facility] ([facility])    [facility]  [address] [address] [phone]              Who to contact     If you have questions or need follow up information about today's clinic visit or your schedule please contact [facility] directly at [phone].  Normal or non-critical lab and imaging results will be communicated to you by MyChart, letter or phone within 4 business days after the clinic has received the results. If you do not hear from us within 7 days, please contact the clinic through MyChart or phone. If you have a critical or abnormal lab result, we will notify you by phone as soon as possible.  Submit refill requests through [email or web address] or call your pharmacy and  "they will forward the refill request to us. Please allow 3 business days for your refill to be completed.          Additional Information About Your Visit        [employer] Information     [employer] gives you secure access to your electronic health record. If you see a primary care provider, you can also send messages to your care team and make appointments. If you have questions, please call your primary care clinic.  If you do not have a primary care provider, please call [phone] and they will assist you.        Care EveryWhere ID     This is your Care EveryWhere ID. This could be used by other organizations to access your [place] medical records  [id number]        Your Vitals Were     Pulse Respirations Height Last Period Breastfeeding? BMI (Body Mass Index)    85 18 5' 3\" (1.6 m) 04/06/2017 (Approximate) No 38.44 kg/m2       Blood Pressure from Last 3 Encounters:   05/01/17 116/80   04/27/17 100/62   04/24/17 117/81    Weight from Last 3 Encounters:   05/01/17 217 lb (98.4 kg)   04/27/17 217 lb (98.4 kg)   04/24/17 211 lb (95.7 kg)              Today, you had the following     No orders found for display         Today's Medication Changes          These changes are accurate as of: 5/1/17  9:56 AM.  If you have any questions, ask your nurse or doctor.               Start taking these medicines.        Dose/Directions    gabapentin 300 MG capsule   Commonly known as:  NEURONTIN   Used for:  Acute non intractable tension-type headache   Started by:  Anna Naidu MD        Take 1 tablet (300 mg) every night for 1-3 days, then 1 tablet twice daily for 1-3 days, then 1 tablet three times daily   Quantity:  90 capsule   Refills:  0         These medicines have changed or have updated prescriptions.        Dose/Directions    fluticasone 50 MCG/ACT spray   Commonly known as:  FLONASE   This may have changed:    - when to take this  - reasons to take this   Used for:  Seasonal allergic rhinitis        Dose:  2 " spray   Spray 2 sprays into both nostrils daily   Quantity:  1 g   Refills:  3            Where to get your medicines      These medications were sent to [facility] - [place], [place] - [address] [facility]  [address] [facility] [place] [facility] [address]     Phone:  [phone]     gabapentin 300 MG capsule                Primary Care Provider Office Phone # Fax #    Anna Naidu [phone]       [facility] [address]  [facility] [address]        Thank you!     Thank you for choosing [facility]  for your care. Our goal is always to provide you with excellent care. Hearing back from our patients is one way we can continue to improve our services. Please take a few minutes to complete the written survey that you may receive in the mail after your visit with us. Thank you!             Your Updated Medication List - Protect others around you: Learn how to safely use, store and throw away your medicines at www.disposemymeds.org.          This list is accurate as of: 5/1/17  9:56 AM.  Always use your most recent med list.                   Brand Name Dispense Instructions for use    aspirin 81 MG EC tablet     30 tablet    Take 1 tablet (81 mg) by mouth daily       calcium carbonate 500 MG chewable tablet    TUMS     Take 1 chew tab by mouth daily as needed for heartburn       cyclobenzaprine 10 MG tablet    FLEXERIL    42 tablet    Take 1 tablet (10 mg) by mouth 3 times daily as needed for muscle spasms       FLUoxetine 20 MG capsule    PROzac    270 capsule    Take 40 mg by mouth daily 12/6/16 -- reduced to 40 mg daily       fluticasone 50 MCG/ACT spray    FLONASE    1 g    Spray 2 sprays into both nostrils daily       gabapentin 300 MG capsule    NEURONTIN    90 capsule    Take 1 tablet (300 mg) every night for 1-3 days, then 1 tablet twice daily for 1-3 days, then 1 tablet three times daily       LORazepam 1 MG tablet    ATIVAN    30  tablet    Take 1 tablet (1 mg) by mouth every 8 hours as needed for anxiety       traMADol 50 MG tablet    ULTRAM    80 tablet    Take 1-2 tablets ( mg) by mouth every 6 hours as needed       TYLENOL PO      Take 1,000 mg by mouth every 6 hours as needed for mild pain or fever

## 2017-05-01 NOTE — PROGRESS NOTES
"  SUBJECTIVE:                                                    Doretha Fernandez is a 42 year old female who presents to clinic today for the following health issues:      Chief Complaint   Patient presents with     RECHECK     Patient is here to follow-up on pain clinic appt. Patient felt the injection helped up until yesterday and noticed the pressure being gone and now a different type of headache. Patient rates pain at a 5/10. Patient is still unable to do daily activities.      Patient had occipital nerve block on 4/27/2017.  Thought it was working, no longer has the pressure on the left side of the head.  Has been up since 4 am with a \"different type of headache\".  Friday/Saturday/Sunday felt the best she had felt in weeks.  Seems to be a \"regular headache\" - bitemporal and wrapping around the head.  Saturday had a headache more on the right side of her occiput.    Pain is at least 50% better than last week.     Needs a form completed to return to work, does a mostly desk job, feels she'll be able to work at this time with the reduction in pain.         /80  Pulse 85  Resp 18  Ht 5' 3\" (1.6 m)  Wt 217 lb (98.4 kg)  LMP 04/06/2017 (Approximate)  Breastfeeding? No  BMI 38.44 kg/m2  EXAM: GENERAL APPEARANCE: Alert, no acute distress  RESP: lungs clear to auscultation   CV: normal rate, regular rhythm, no murmur or gallop  ABDOMEN: soft, no organomegaly, masses or tenderness  NEURO: Alert, oriented, speech and mentation normal, Cranial nerves 2-12 are normal.    ASSESSMENT/PLAN:      ICD-10-CM    1. Acute non intractable tension-type headache G44.209 gabapentin (NEURONTIN) 300 MG capsule     Improvement in pain from last week when I felt this was more occipital neuralgia.  She had a return of headache over night but \"different feeling\" more sharp/stabbing and less pressure.     She is in agreement to try something to help reduce the pain - will start gabapentin and titrate up.  Asked that she also " schedule follow up with Dr. Johnson.    Patient Instructions   Start gabapentin/neurontin - follow directions.    Recheck with Dr. Johnson.       Anna Naidu M.D.        Thank you for choosing [facility].  You may be receiving a survey in the mail from [employer] regarding your visit today.  Please take a few minutes to complete and return the survey to let us know how we are doing.      Our Clinic hours are:  Mondays    7:20 am - 7 pm  Tues -  Fri  7:20 am - 5 pm    Clinic Phone: [phone]    The clinic lab opens at 7:30 am Mon - Fri and appointments are required.    [facility]  Ph. [phone]  Monday-Thursday 8 am - 7pm  Tues/Wed/Fri 8 am - 5:30 pm

## 2017-05-05 ENCOUNTER — HOSPITAL ENCOUNTER (OUTPATIENT)
Dept: LAB | Facility: CLINIC | Age: 42
Discharge: HOME | End: 2017-05-05
Attending: INTERNAL MEDICINE | Admitting: INTERNAL MEDICINE
Payer: COMMERCIAL

## 2017-05-05 ENCOUNTER — ONCOLOGY VISIT (OUTPATIENT)
Dept: ONCOLOGY | Facility: CLINIC | Age: 42
End: 2017-05-05
Attending: INTERNAL MEDICINE
Payer: COMMERCIAL

## 2017-05-05 VITALS
HEART RATE: 86 BPM | OXYGEN SATURATION: 97 % | DIASTOLIC BLOOD PRESSURE: 72 MMHG | TEMPERATURE: 99.3 F | RESPIRATION RATE: 18 BRPM | HEIGHT: 63 IN | WEIGHT: 212.3 LBS | BODY MASS INDEX: 37.62 KG/M2 | SYSTOLIC BLOOD PRESSURE: 116 MMHG

## 2017-05-05 DIAGNOSIS — D68.52 PROTHROMBIN MUTATION (H): Primary | ICD-10-CM

## 2017-05-05 DIAGNOSIS — G44.209 ACUTE NON INTRACTABLE TENSION-TYPE HEADACHE: ICD-10-CM

## 2017-05-05 LAB — D DIMER PPP FEU-MCNC: 0.4 UG/ML FEU (ref 0–0.5)

## 2017-05-05 PROCEDURE — 86147 CARDIOLIPIN ANTIBODY EA IG: CPT | Performed by: INTERNAL MEDICINE

## 2017-05-05 PROCEDURE — 99211 OFF/OP EST MAY X REQ PHY/QHP: CPT

## 2017-05-05 PROCEDURE — 36415 COLL VENOUS BLD VENIPUNCTURE: CPT | Performed by: INTERNAL MEDICINE

## 2017-05-05 PROCEDURE — 99204 OFFICE O/P NEW MOD 45 MIN: CPT | Performed by: INTERNAL MEDICINE

## 2017-05-05 PROCEDURE — 86146 BETA-2 GLYCOPROTEIN ANTIBODY: CPT | Performed by: INTERNAL MEDICINE

## 2017-05-05 PROCEDURE — 85379 FIBRIN DEGRADATION QUANT: CPT | Performed by: INTERNAL MEDICINE

## 2017-05-05 ASSESSMENT — PAIN SCALES - GENERAL: PAINLEVEL: WORST PAIN (10)

## 2017-05-05 NOTE — MR AVS SNAPSHOT
After Visit Summary   5/5/2017    Doretha Fernandez    MRN: [id number]           Patient Information     Date Of Birth          1975        Visit Information        Provider Department      5/5/2017 2:00 PM Kathy Richards MD [facility] ON[facility]      Today's Diagnoses     Prothrombin mutation (H)    -  1    Acute non intractable tension-type headache          Care Instructions    You will need to have labs drawn today. We would like to see you back in clinic with Dr. Richards in 1 week.  Copy of appointments, and after visit summary (AVS) given to patient.  If you have any questions during business hours (M-F 8 AM- 4PM), please call Jeanie Keenan RN, BSN, OCN Oncology Hematology /Breast Cancer Navigator at [facility] [phone].   For questions after business hours, or on holidays/weekends, please call our after hours Nurse Triage line [phone]. Thank you.          Follow-ups after your visit        Follow-up notes from your care team     Return in about 1 week (around 5/12/2017) for lab ordered today.      Your next 10 appointments already scheduled     May 17, 2017  3:30 PM CDT   Return Visit with Kathy Richards MD   [facility]    [facility]  [address] [address] [phone]              Who to contact     If you have questions or need follow up information about today's clinic visit or your schedule please contact [facility] directly at [phone].  Normal or non-critical lab and imaging results will be communicated to you by MyChart, letter or phone within 4 business days after the clinic has received the results. If you do not hear from us within 7 days, please contact the clinic through MyChart or phone. If you have a critical or abnormal lab result, we will notify you by phone as soon as possible.  Submit refill requests  "through [employer] or call your pharmacy and they will forward the refill request to us. Please allow 3 business days for your refill to be completed.          Additional Information About Your Visit        [employer] Information     [employer] gives you secure access to your electronic health record. If you see a primary care provider, you can also send messages to your care team and make appointments. If you have questions, please call your primary care clinic.  If you do not have a primary care provider, please call [phone] and they will assist you.        Care EveryWhere ID     This is your Care EveryWhere ID. This could be used by other organizations to access your [place] medical records  [id number]        Your Vitals Were     Pulse Temperature Respirations Height Last Period Pulse Oximetry    86 99.3  F (37.4  C) (Tympanic) 18 1.6 m (5' 3\") 04/06/2017 (Approximate) 97%    Breastfeeding? BMI (Body Mass Index)                No 37.61 kg/m2           Blood Pressure from Last 3 Encounters:   05/05/17 116/72   05/01/17 116/80   04/27/17 100/62    Weight from Last 3 Encounters:   05/05/17 96.3 kg (212 lb 4.8 oz)   05/01/17 98.4 kg (217 lb)   04/27/17 98.4 kg (217 lb)              We Performed the Following     Beta 2 Glycoprotein 1 Antibody IgG     Cardiolipin Anju IgG and IgM     D dimer quantitative        Primary Care Provider Office Phone # Fax #    Anna Naidu [phone]       [facility] [address] [facility] [address]        Thank you!     Thank you for choosing [facility]  for your care. Our goal is always to provide you with excellent care. Hearing back from our patients is one way we can continue to improve our services. Please take a few minutes to complete the written survey that you may receive in the mail after your visit with us. Thank you!             Your Updated Medication List - Protect others around you: Learn how to safely use, store and throw away " your medicines at www.disposemymeds.org.          This list is accurate as of: 5/5/17  3:07 PM.  Always use your most recent med list.                   Brand Name Dispense Instructions for use    aspirin 81 MG EC tablet     30 tablet    Take 1 tablet (81 mg) by mouth daily       calcium carbonate 500 MG chewable tablet    TUMS     Take 1 chew tab by mouth daily as needed for heartburn Reported on 5/5/2017       cyclobenzaprine 10 MG tablet    FLEXERIL    42 tablet    Take 1 tablet (10 mg) by mouth 3 times daily as needed for muscle spasms       FLUoxetine 20 MG capsule    PROzac    270 capsule    Take 40 mg by mouth daily 12/6/16 -- reduced to 40 mg daily       fluticasone 50 MCG/ACT spray    FLONASE    1 g    Spray 2 sprays into both nostrils daily       gabapentin 300 MG capsule    NEURONTIN    90 capsule    Take 1 tablet (300 mg) every night for 1-3 days, then 1 tablet twice daily for 1-3 days, then 1 tablet three times daily       LORazepam 1 MG tablet    ATIVAN    30 tablet    Take 1 tablet (1 mg) by mouth every 8 hours as needed for anxiety       traMADol 50 MG tablet    ULTRAM    80 tablet    Take 1-2 tablets ( mg) by mouth every 6 hours as needed       TYLENOL PO      Take 1,000 mg by mouth every 6 hours as needed for mild pain or fever

## 2017-05-05 NOTE — PATIENT INSTRUCTIONS
You will need to have labs drawn today. We would like to see you back in clinic with Dr. Richards in 1 week.  Copy of appointments, and after visit summary (AVS) given to patient.  If you have any questions during business hours (M-F 8 AM- 4PM), please call Jeanie Keenan RN, BSN, OCN Oncology Hematology /Breast Cancer Navigator at [facility] [phone].   For questions after business hours, or on holidays/weekends, please call our after hours Nurse Triage line [phone]. Thank you.

## 2017-05-05 NOTE — NURSING NOTE
"Oncology Rooming Note    May 5, 2017 2:07 PM   Doretha Fernandez is a 42 year old female who presents for:    Chief Complaint   Patient presents with     Oncology Clinic Visit     New patient, Prothrombin mutation.      Initial Vitals: /72 (BP Location: Right arm, Patient Position: Chair, Cuff Size: Adult Large)  Pulse 86  Temp 99.3  F (37.4  C) (Tympanic)  Resp 18  Ht 1.6 m (5' 3\")  Wt 96.3 kg (212 lb 4.8 oz)  LMP 04/06/2017 (Approximate)  SpO2 97%  Breastfeeding? No  BMI 37.61 kg/m2 Estimated body mass index is 37.61 kg/(m^2) as calculated from the following:    Height as of this encounter: 1.6 m (5' 3\").    Weight as of this encounter: 96.3 kg (212 lb 4.8 oz). Body surface area is 2.07 meters squared.  Worst Pain (10) Comment: headaches that wrap around the head.    Patient's last menstrual period was 04/06/2017 (approximate).  Allergies reviewed: Yes  Medications reviewed: Yes    Medications: Medication refills not needed today.  Pharmacy name entered into [employer]: [facility] [place], [place] - [address] [place] [address]    Clinical concerns: New patient, prothrombin mutation. Patient c/o of extreme headaches that tend to wrap around her head. She developed left sided vision loss and left sided numbness about a month ago. Had injection a few weeks ago but reports pain on the opposite side.  Patient also notices tingling and numbness bilaterally in her feet, toes, hands and fingers since September of 2016.    10 minutes for nursing intake (face to face time)     Dai Canales, [facility]            "

## 2017-05-07 LAB — B2 GLYCOPROT1 IGG SERPL IA-ACNC: 1.6 U/ML

## 2017-05-08 ENCOUNTER — TELEPHONE (OUTPATIENT)
Dept: FAMILY MEDICINE | Facility: CLINIC | Age: 42
End: 2017-05-08

## 2017-05-08 DIAGNOSIS — L55.9 SUNBURN: Primary | ICD-10-CM

## 2017-05-08 LAB
CARDIOLIPIN ANTIBODY IGG: NORMAL GPL-U/ML (ref 0–19.9)
CARDIOLIPIN ANTIBODY IGM: 1.6 MPL-U/ML (ref 0–19.9)

## 2017-05-08 RX ORDER — SILVER SULFADIAZINE 10 MG/G
CREAM TOPICAL 2 TIMES DAILY
Qty: 85 G | Refills: 1 | Status: SHIPPED | OUTPATIENT
Start: 2017-05-08 | End: 2017-05-15

## 2017-05-08 NOTE — TELEPHONE ENCOUNTER
Prescription for silver sulfadiazine cream.  It will improve over the next few days.  Does need to be very careful with the sun exposure.  High SPF sunblock, keep covered, etc.    Can send to pharmacy of her choice as it's not indicated.     Continue aloe, tylenol.  Stay well hydrated.    Anna Naidu M.D.

## 2017-05-08 NOTE — TELEPHONE ENCOUNTER
S-(situation): patient reports she has a sunburn due to the medication she is on.      B-(background): Patient reports she was outside of for about 1.5 hours yesterday in the sun.    A-(assessment): Patient reports she has swelling in both arms. She has red blotchy areas on the chest and the face.  Patient denies any blisters, numbness or tingly in the arms or fevers. Patient denies any eye pain or decreased vision.  Patient denies any dizziness or faintness.   Patient reports she is drinking well.  Patient states she had tried aloe vera, tylenol and cool showers and nothing is helping.      R-(recommendations): Patient would like to know what else she can take with the medication she is prescribed.  Patient states nothing listed above is helping and would like something else to put on her arms and chest.  Will send to provider for review and advise.    Polly BAJWA RN

## 2017-05-08 NOTE — TELEPHONE ENCOUNTER
Reason for Call:  Other med questions     Detailed comments: pt has a sun reaction due to medication she is on, wants to know what she can take   States its a little more then a sun burn     Phone Number Patient can be reached at: [phone]    Best Time: any     Can we leave a detailed message on this number? YES    Call taken on 5/8/2017 at 11:11 AM by Clari Pugh

## 2017-05-09 NOTE — PROGRESS NOTES
DATE OF VISIT: May 5, 2017    REASON FOR REFERRAL: Prothrombin gene mutation    CHIEF COMPLAINT:   Chief Complaint   Patient presents with     Oncology Clinic Visit     New patient, Prothrombin mutation.        HISTORY OF PRESENT ILLNESS:   This is a 42-year-old female patient is here today for evaluation for history of previous stroke following pregnancy related to prothrombin gene mutation. Patient has says a first incident following delivery in 2008 when she started to have sensory symptoms on the left. MRI at that time showed right parietal lobe stroke. At that time she was treated with aspirin. Hypercoagulability workup came back positive for prothrombin gene mutation. Same incident happened again following her delivery when she had transient visual changes. At that time MRI shows no new abnormalities. Both events related to her pregnancy. Patient has been on aspirin. Patient has been followed by neurology. More recently she developed migraine type headache. Recent MRI did not show any abnormalities. EKG was done which was normal. Ophthalmology exam was normal as well. The patient is here today to discuss prothrombin gene mutation abnormality and the implication and relation to her current symptoms. The patient denies any family history of deep venous thrombosis, or repeated miscarriages. Her main complaints is severe left-sided headache, visual changes.    REVIEW OF SYSTEMS:   Constitutional: Negative for fever, chills, and night sweats.  Skin: negative.  Eyes: negative.  Ears/Nose/Throat: negative.  Respiratory: No shortness of breath, dyspnea on exertion, cough, or hemoptysis.  Cardiovascular: negative.  Gastrointestinal: negative.  Genitourinary: negative.  Musculoskeletal: negative.  Neurologic: Headache as mentioned above  Psychiatric: negative.  Hematologic/Lymphatic/Immunologic: negative.  Endocrine: negative.    PAST MEDICAL HISTORY:   Past Medical History:   Diagnosis Date     Abnormal MRI      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      Lumbago     left lower back pain     Prothrombin deficiency (H)     takes 81mg asa daily     Stroke (cerebrum) (H) 2000    during labor, difficult      TIA (transient ischemic attack) 2004       PAST SURGICAL HISTORY:   No past surgical history on file.    ALLERGIES:   Allergies as of 05/05/2017 - Nicola as Reviewed 05/05/2017   Allergen Reaction Noted     Erythrocin Nausea and Vomiting 03/20/2008     Zithromax [azithromycin dihydrate] Diarrhea 02/17/2012       MEDICATIONS:   Current Outpatient Prescriptions   Medication Sig Dispense Refill     gabapentin (NEURONTIN) 300 MG capsule Take 1 tablet (300 mg) every night for 1-3 days, then 1 tablet twice daily for 1-3 days, then 1 tablet three times daily 90 capsule 0     aspirin EC 81 MG EC tablet Take 1 tablet (81 mg) by mouth daily 30 tablet 1     cyclobenzaprine (FLEXERIL) 10 MG tablet Take 1 tablet (10 mg) by mouth 3 times daily as needed for muscle spasms 42 tablet 0     FLUoxetine (PROZAC) 20 MG capsule Take 40 mg by mouth daily 12/6/16 -- reduced to 40 mg daily 270 capsule 01     Acetaminophen (TYLENOL PO) Take 1,000 mg by mouth every 6 hours as needed for mild pain or fever       silver sulfADIAZINE (SILVADENE) 1 % cream Apply topically 2 times daily for 7 days 85 g 1     traMADol (ULTRAM) 50 MG tablet Take 1-2 tablets ( mg) by mouth every 6 hours as needed (Patient not taking: Reported on 5/5/2017) 80 tablet 0     calcium carbonate (TUMS) 500 MG chewable tablet Take 1 chew tab by mouth daily as needed for heartburn Reported on 5/5/2017       LORazepam (ATIVAN) 1 MG tablet Take 1 tablet (1 mg) by mouth every 8 hours as needed for anxiety (Patient not taking: Reported on 5/5/2017) 30 tablet 0     fluticasone (FLONASE) 50 MCG/ACT nasal spray Spray 2 sprays into both nostrils daily (Patient not taking: Reported on 5/5/2017) 1 g 3        FAMILY HISTORY:   Family History   Problem Relation Age of Onset      CANCER Mother 45     lung     Neurologic Disorder Mother      Lipids Father      GASTROINTESTINAL DISEASE Father      Depression Father      CANCER Maternal Grandmother      Blood Disease Maternal Grandmother      Arthritis Maternal Grandmother      DIABETES Maternal Grandmother      Depression Maternal Grandmother      DIABETES Maternal Grandfather      CEREBROVASCULAR DISEASE Maternal Grandfather      Blood Disease Maternal Grandfather      HEART DISEASE Maternal Grandfather      CANCER Paternal Grandmother      Cancer - colorectal Paternal Grandmother      Respiratory Paternal Grandfather      Blood Disease Paternal Grandfather      HEART DISEASE Daughter      Asthma Daughter      Depression Sister        SOCIAL HISTORY:   Social History     Social History     Marital status:      Spouse name: N/A     Number of children: N/A     Years of education: N/A     Social History Main Topics     Smoking status: Passive Smoke Exposure - Never Smoker     Last attempt to quit: 3/20/1998     Smokeless tobacco: Never Used     Alcohol use Yes      Comment: occ     Drug use: No     Sexual activity: Yes     Partners: Male     Birth control/ protection: Surgical     Other Topics Concern     Parent/Sibling W/ Cabg, Mi Or Angioplasty Before 65f 55m? No     Social History Narrative    20 y.o- patient's mother   of lung cancer. She had to take care of her younger sister.    2012- patient's  had a heart attack with stents placed, followed by cardiac rehabilitation    2000 TO 2012  was in [facility] for depression    2013 patient's  went through alcohol rehabilitation at [facility] inpatient            They have attended couple counseling a couple of times and patient went to the family program for chemical dependency.    Patient denies alcohol or drug use and herself            Has 2 children, girls ages 5 and 8    [employer] and also  "works for the [employer]. 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               PHYSICAL EXAMINATION:   /72 (BP Location: Right arm, Patient Position: Chair, Cuff Size: Adult Large)  Pulse 86  Temp 99.3  F (37.4  C) (Tympanic)  Resp 18  Ht 1.6 m (5' 3\")  Wt 96.3 kg (212 lb 4.8 oz)  LMP 04/06/2017 (Approximate)  SpO2 97%  Breastfeeding? No  BMI 37.61 kg/m2  Wt Readings from Last 10 Encounters:   05/05/17 96.3 kg (212 lb 4.8 oz)   05/01/17 98.4 kg (217 lb)   04/27/17 98.4 kg (217 lb)   04/24/17 95.7 kg (211 lb)   04/01/17 93.5 kg (206 lb 2.1 oz)   03/27/17 92.5 kg (204 lb)   12/16/16 91.6 kg (202 lb)   12/14/16 92.1 kg (203 lb)   12/06/16 93.3 kg (205 lb 9.6 oz)   12/05/16 92.5 kg (204 lb)      GENERAL APPEARANCE: Healthy, alert and in no acute distress.  HEENT: Sclerae anicteric. PERRLA. Oropharynx without ulcers, lesions, or thrush.  NECK: Supple. No asymmetry or masses.  LYMPHATICS: No palpable cervical, supraclavicular, axillary, or inguinal lymphadenopathy.  RESP: Lungs clear to auscultation bilaterally without rales, rhonchi or wheezes.  CARDIOVASCULAR: Regular rate and rhythm. Normal S1, S2; no S3 or S4. No murmur, gallop, or rub.  ABDOMEN: Soft, nontender. Bowel sounds normal. No palpable organomegaly or masses.  MUSCULOSKELETAL: Extremities without gross deformities noted. No edema of bilateral lower extremities.  SKIN: No suspicious lesions or rashes.  NEURO: Alert and oriented x 3. Cranial nerves II-XII grossly intact.  PSYCHIATRIC: Mentation and affect appear normal.    LABORATORY RESULTS:  Admission on 04/01/2017, Discharged on 04/05/2017   Component Date Value Ref Range Status     WBC 04/01/2017 6.6  4.0 - 11.0 10e9/L Final     RBC Count 04/01/2017 4.28  3.8 - 5.2 10e12/L Final     Hemoglobin 04/01/2017 12.1  11.7 - 15.7 g/dL Final     Hematocrit 04/01/2017 36.7  35.0 - 47.0 % Final     MCV 04/01/2017 86  78 - 100 fl Final     MCH 04/01/2017 28.3  26.5 - 33.0 pg Final     " MCHC 04/01/2017 33.0  31.5 - 36.5 g/dL Final     RDW 04/01/2017 12.5  10.0 - 15.0 % Final     Platelet Count 04/01/2017 247  150 - 450 10e9/L Final     Diff Method 04/01/2017 Automated Method   Final     % Neutrophils 04/01/2017 55.4  % Final     % Lymphocytes 04/01/2017 37.0  % Final     % Monocytes 04/01/2017 6.0  % Final     % Eosinophils 04/01/2017 0.8  % Final     % Basophils 04/01/2017 0.3  % Final     % Immature Granulocytes 04/01/2017 0.5  % Final     Absolute Neutrophil 04/01/2017 3.7  1.6 - 8.3 10e9/L Final     Absolute Lymphocytes 04/01/2017 2.5  0.8 - 5.3 10e9/L Final     Absolute Monocytes 04/01/2017 0.4  0.0 - 1.3 10e9/L Final     Absolute Eosinophils 04/01/2017 0.1  0.0 - 0.7 10e9/L Final     Absolute Basophils 04/01/2017 0.0  0.0 - 0.2 10e9/L Final     Abs Immature Granulocytes 04/01/2017 0.0  0 - 0.4 10e9/L Final     Sodium 04/01/2017 138  133 - 144 mmol/L Final     Potassium 04/01/2017 3.8  3.4 - 5.3 mmol/L Final    Specimen slightly hemolyzed, potassium may be falsely elevated     Chloride 04/01/2017 104  94 - 109 mmol/L Final     Carbon Dioxide 04/01/2017 27  20 - 32 mmol/L Final     Anion Gap 04/01/2017 7  3 - 14 mmol/L Final     Glucose 04/01/2017 84  70 - 99 mg/dL Final     Urea Nitrogen 04/01/2017 14  7 - 30 mg/dL Final     Creatinine 04/01/2017 0.66  0.52 - 1.04 mg/dL Final     GFR Estimate 04/01/2017   >60 mL/min/1.7m2 Final                    Value:>90  Non  GFR Calc       GFR Estimate If Black 04/01/2017   >60 mL/min/1.7m2 Final                    Value:>90   GFR Calc       Calcium 04/01/2017 9.0  8.5 - 10.1 mg/dL Final     INR 04/01/2017 0.92  0.86 - 1.14 Final     Sed Rate 04/01/2017 16  0 - 20 mm/h Final     CRP Inflammation 04/01/2017 4.4  0.0 - 8.0 mg/L Final     PTT 04/01/2017 27  22 - 37 sec Final     WBC 04/04/2017 8.6  4.0 - 11.0 10e9/L Final     RBC Count 04/04/2017 4.16  3.8 - 5.2 10e12/L Final     Hemoglobin 04/04/2017 11.8  11.7 - 15.7 g/dL  Final     Hematocrit 04/04/2017 36.1  35.0 - 47.0 % Final     MCV 04/04/2017 87  78 - 100 fl Final     MCH 04/04/2017 28.4  26.5 - 33.0 pg Final     MCHC 04/04/2017 32.7  31.5 - 36.5 g/dL Final     RDW 04/04/2017 12.7  10.0 - 15.0 % Final     Platelet Count 04/04/2017 249  150 - 450 10e9/L Final     Diff Method 04/04/2017 Automated Method   Final     % Neutrophils 04/04/2017 52.7  % Final     % Lymphocytes 04/04/2017 38.4  % Final     % Monocytes 04/04/2017 7.5  % Final     % Eosinophils 04/04/2017 0.5  % Final     % Basophils 04/04/2017 0.2  % Final     % Immature Granulocytes 04/04/2017 0.7  % Final     Absolute Neutrophil 04/04/2017 4.5  1.6 - 8.3 10e9/L Final     Absolute Lymphocytes 04/04/2017 3.3  0.8 - 5.3 10e9/L Final     Absolute Monocytes 04/04/2017 0.6  0.0 - 1.3 10e9/L Final     Absolute Eosinophils 04/04/2017 0.0  0.0 - 0.7 10e9/L Final     Absolute Basophils 04/04/2017 0.0  0.0 - 0.2 10e9/L Final     Abs Immature Granulocytes 04/04/2017 0.1  0 - 0.4 10e9/L Final     Sodium 04/04/2017 145* 133 - 144 mmol/L Final     Potassium 04/04/2017 3.7  3.4 - 5.3 mmol/L Final     Chloride 04/04/2017 111* 94 - 109 mmol/L Final     Carbon Dioxide 04/04/2017 25  20 - 32 mmol/L Final     Anion Gap 04/04/2017 9  3 - 14 mmol/L Final     Glucose 04/04/2017 80  70 - 99 mg/dL Final     Urea Nitrogen 04/04/2017 16  7 - 30 mg/dL Final     Creatinine 04/04/2017 0.80  0.52 - 1.04 mg/dL Final     GFR Estimate 04/04/2017 79  >60 mL/min/1.7m2 Final    Non  GFR Calc     GFR Estimate If Black 04/04/2017   >60 mL/min/1.7m2 Final                    Value:>90   GFR Calc       Calcium 04/04/2017 8.8  8.5 - 10.1 mg/dL Final     CRP Inflammation 04/04/2017 <2.9  0.0 - 8.0 mg/L Final       IMAGING RESULTS:  Recent Results (from the past 744 hour(s))   CT Head Neck Angio w/o & w Contrast    Narrative    CT ANGIOGRAM OF THE HEAD AND NECK WITHOUT AND WITH CONTRAST 4/14/2017  7:50 PM     HISTORY: Prolonged  neurologic changes. History of stroke. Recent  stable MRI. Headache. Right posterior parietal brain lesion that may  be due to low-grade tumor, migrational anomaly or previous ischemic or  traumatic insult.    TECHNIQUE: Precontrast localizing scans were followed by CT  angiography with an injection of 70 mL Isovue-370 IV with scans  through the head and neck. Images were transferred to a separate 3-D  workstation where multiplanar reformations and 3-D images were  created. Estimates of carotid stenoses are made relative to the distal  internal carotid artery diameters except as noted. Radiation dose for  this scan was reduced using automated exposure control, adjustment of  the mA and/or kV according to patient size, or iterative  reconstruction technique.    COMPARISON: MR angiogram 12/1/2016    CT HEAD FINDINGS: No contrast enhancing lesions. Cerebral blood flow  is grossly normal. There is specifically no evidence of an  arteriovenous malformation or other contrast-enhancing lesion in the  right parietal lobe in the region of the signal abnormality seen on  the MRI.    CT ANGIOGRAM HEAD FINDINGS: Arteries are widely patent with no  aneurysm, significant stenosis, occlusion or intraarterial thrombus.  Venous circulation is unremarkable.    CT ANGIOGRAM NECK FINDINGS:   Right carotid artery: No significant stenosis.     Left carotid artery: No significant stenosis.     Vertebral arteries: No significant stenosis.     Other findings: None.      Impression    IMPRESSION: Normal CT angiogram of the head and neck. No evidence of a  vascular lesion in the right parietal lobe.      RODRI PEREZ MD       ASSESSMENT AND PLAN:  (D68.59) Prothrombin mutation (H)  (primary encounter diagnosis)  This is a 42-year-old female patient with remote history of right parietal lobe stroke with a history of prothrombin gene mutation. Patient currently on aspirin. The patient never had any deep venous thrombosis or pulmonary  embolism. I have explained to the patient that her current symptoms probably not related prothrombin gene mutation. I would recommend today to check D dimer quantitative, Cardiolipin Anju IgG and  IgM, Beta 2 Glycoprotein 1 Antibody IgG . I will try to obtain old records. Meanwhile asked her to continue with aspirin. I do not recommend anticoagulation without evidence of thromboembolic event.  I suggested to increase the aspirin to 325 mg orally daily. I will see the patient again in a couple of weeks to discuss with the results of these tests.    (G44.209) Acute non intractable tension-type headache  Patient has been followed by neurology.    The patient is ready to learn, no apparent learning barriers were identified, Diagnosis and treatment plans were explained to the patient. The patient expressed understanding of the content. The patient questions were answered to her satisfaction.    Kathy Richards MD    Chart documentation with Dragon Voice recognition Software. Although reviewed after completion, some words and grammatical errors may remain.

## 2017-05-15 DIAGNOSIS — D68.52 PROTHROMBIN MUTATION (H): ICD-10-CM

## 2017-05-15 RX ORDER — ASPIRIN 325 MG
325 TABLET, DELAYED RELEASE (ENTERIC COATED) ORAL DAILY
Qty: 30 TABLET | Refills: 3 | COMMUNITY
Start: 2017-05-15 | End: 2017-06-05

## 2017-05-17 ENCOUNTER — ONCOLOGY VISIT (OUTPATIENT)
Dept: ONCOLOGY | Facility: CLINIC | Age: 42
End: 2017-05-17
Attending: INTERNAL MEDICINE
Payer: COMMERCIAL

## 2017-05-17 VITALS
SYSTOLIC BLOOD PRESSURE: 116 MMHG | HEART RATE: 72 BPM | BODY MASS INDEX: 37.16 KG/M2 | HEIGHT: 63 IN | OXYGEN SATURATION: 97 % | WEIGHT: 209.7 LBS | RESPIRATION RATE: 18 BRPM | DIASTOLIC BLOOD PRESSURE: 76 MMHG | TEMPERATURE: 98.5 F

## 2017-05-17 DIAGNOSIS — G44.209 ACUTE NON INTRACTABLE TENSION-TYPE HEADACHE: ICD-10-CM

## 2017-05-17 DIAGNOSIS — D68.52 PROTHROMBIN MUTATION (H): Primary | ICD-10-CM

## 2017-05-17 PROCEDURE — 99211 OFF/OP EST MAY X REQ PHY/QHP: CPT

## 2017-05-17 PROCEDURE — 99214 OFFICE O/P EST MOD 30 MIN: CPT | Performed by: INTERNAL MEDICINE

## 2017-05-17 ASSESSMENT — PAIN SCALES - GENERAL: PAINLEVEL: EXTREME PAIN (9)

## 2017-05-17 NOTE — PATIENT INSTRUCTIONS
We would like to see you back in clinic with Dr. Richards as needed. Copy of after visit summary (AVS) given to patient.  If you have any questions during business hours (M-F 8 AM- 4PM), please call Jeanie Keenan RN, BSN, OCN Oncology Hematology /Breast Cancer Navigator at [facility] [phone].   For questions after business hours, or on holidays/weekends, please call our after hours Nurse Triage line [phone]. Thank you.

## 2017-05-17 NOTE — MR AVS SNAPSHOT
After Visit Summary   5/17/2017    Doretha Fernandez    MRN: [id number]           Patient Information     Date Of Birth          1975        Visit Information        Provider Department      5/17/2017 3:30 PM Kathy Richards MD [facility]      Today's Diagnoses     Prothrombin mutation (H)    -  1    Acute non intractable tension-type headache          Care Instructions    We would like to see you back in clinic with Dr. Richards as needed. Copy of after visit summary (AVS) given to patient.  If you have any questions during business hours (M-F 8 AM- 4PM), please call Jeanie Keenan RN, BSN, OCN Oncology Hematology /Breast Cancer Navigator at [facility] [phone].   For questions after business hours, or on holidays/weekends, please call our after hours Nurse Triage line [phone]. Thank you.          Follow-ups after your visit        Follow-up notes from your care team     Return if symptoms worsen or fail to improve.      Your next 10 appointments already scheduled     May 23, 2017 12:00 PM CDT   New Patient Visit with Bairon Miranda MD   [facility] ([facility])    [address], [address]  [place] [address] [phone]              Who to contact     If you have questions or need follow up information about today's clinic visit or your schedule please contact [facility] directly at [phone].  Normal or non-critical lab and imaging results will be communicated to you by MyChart, letter or phone within 4 business days after the clinic has received the results. If you do not hear from us within 7 days, please contact the clinic through MyChart or phone. If you have a critical or abnormal lab result, we will notify you by phone as soon as possible.  Submit refill requests through [employer] or call your pharmacy and they will forward the  "refill request to us. Please allow 3 business days for your refill to be completed.          Additional Information About Your Visit        [employer] Information     [employer] gives you secure access to your electronic health record. If you see a primary care provider, you can also send messages to your care team and make appointments. If you have questions, please call your primary care clinic.  If you do not have a primary care provider, please call [phone] and they will assist you.        Care EveryWhere ID     This is your Care EveryWhere ID. This could be used by other organizations to access your [place] medical records  [id number]        Your Vitals Were     Pulse Temperature Respirations Height Pulse Oximetry Breastfeeding?    72 98.5  F (36.9  C) (Tympanic) 18 1.6 m (5' 3\") 97% No    BMI (Body Mass Index)                   37.15 kg/m2            Blood Pressure from Last 3 Encounters:   05/17/17 116/76   05/05/17 116/72   05/01/17 116/80    Weight from Last 3 Encounters:   05/17/17 95.1 kg (209 lb 11.2 oz)   05/05/17 96.3 kg (212 lb 4.8 oz)   05/01/17 98.4 kg (217 lb)              Today, you had the following     No orders found for display       Primary Care Provider Office Phone # Fax #    Anna Naidu [phone]       [facility] [address] [address] [address]        Thank you!     Thank you for choosing [facility]  for your care. Our goal is always to provide you with excellent care. Hearing back from our patients is one way we can continue to improve our services. Please take a few minutes to complete the written survey that you may receive in the mail after your visit with us. Thank you!             Your Updated Medication List - Protect others around you: Learn how to safely use, store and throw away your medicines at www.disposemymeds.org.          This list is accurate as of: 5/17/17  4:11 PM.  Always use your most recent med list.                   " Brand Name Dispense Instructions for use    * aspirin 81 MG EC tablet          * aspirin 325 MG EC tablet     30 tablet    Take 325 mg by mouth daily Reported on 5/17/2017       calcium carbonate 500 MG chewable tablet    TUMS     Take 1 chew tab by mouth daily as needed for heartburn Reported on 5/17/2017       cyclobenzaprine 10 MG tablet    FLEXERIL    42 tablet    Take 1 tablet (10 mg) by mouth 3 times daily as needed for muscle spasms       FLUoxetine 20 MG capsule    PROzac    270 capsule    Take 40 mg by mouth daily 12/6/16 -- reduced to 40 mg daily       fluticasone 50 MCG/ACT spray    FLONASE    1 g    Spray 2 sprays into both nostrils daily       gabapentin 300 MG capsule    NEURONTIN    90 capsule    Take 1 tablet (300 mg) every night for 1-3 days, then 1 tablet twice daily for 1-3 days, then 1 tablet three times daily       LORazepam 1 MG tablet    ATIVAN    30 tablet    Take 1 tablet (1 mg) by mouth every 8 hours as needed for anxiety       TYLENOL PO      Take 1,000 mg by mouth every 6 hours as needed for mild pain or fever       * Notice:  This list has 2 medication(s) that are the same as other medications prescribed for you. Read the directions carefully, and ask your doctor or other care provider to review them with you.

## 2017-05-17 NOTE — NURSING NOTE
"Oncology Rooming Note    May 17, 2017 3:34 PM   Doretha eFrnandez is a 42 year old female who presents for:    Chief Complaint   Patient presents with     Oncology Clinic Visit     1 week recheck, review labs     Initial Vitals: /76 (BP Location: Right arm, Patient Position: Chair, Cuff Size: Adult Large)  Pulse 72  Temp 98.5  F (36.9  C) (Tympanic)  Resp 18  Ht 1.6 m (5' 3\")  Wt 95.1 kg (209 lb 11.2 oz)  SpO2 97%  Breastfeeding? No  BMI 37.15 kg/m2 Estimated body mass index is 37.15 kg/(m^2) as calculated from the following:    Height as of this encounter: 1.6 m (5' 3\").    Weight as of this encounter: 95.1 kg (209 lb 11.2 oz). Body surface area is 2.06 meters squared.  Extreme Pain (9) Comment: Data Unavailable   No LMP recorded.  Allergies reviewed: Yes  Medications reviewed: Yes    Medications: Medication refills not needed today.  Pharmacy name entered into [employer]: [facility] [place], [place] - [address]    Clinical concerns: 1 week recheck, review labs. Patient reports she has a consistant headache since April 1st,2017 & vision changes..     7 minutes for nursing intake (face to face time)     Lesa Patel CMA              "

## 2017-05-19 NOTE — PROGRESS NOTES
DATE OF VISIT: May 17, 2017    Doretha Fernandez is a 42 year old female is seen today for   Chief Complaint   Patient presents with     Oncology Clinic Visit     1 week recheck, review labs   .       (D68.59) Prothrombin mutation (H)  (primary encounter diagnosis)  I reviewed with the patient today the results from most recent laboratory tests including d-dimer which came back within normal range. Anticardiolipin antibody also within normal. I explained to the patient that her symptoms currently not related to prothrombin gene mutation or active thrombosis. I recommend to continue on aspirin. I haven't scheduled patient for any further appointments I will see her in the future if there is new developments or concerns.    (G44.209) Acute non intractable tension-type headache  Patient has been followed by neurology    The patient is ready to learn, no apparent learning barriers were identified.  Diagnosis and treatment plans were explained to the patient. The patient expressed understanding of the content. The patient asked appropriate questions. The patient questions were answered to her satisfaction.  Time spent 25 minutes more than 50% of the time in counseling coordination of care including discussion of thromboembolic disease and prothrombin gene mutation.  Chart documentation with Dragon Voice recognition Software. Although reviewed after completion, some words and grammatical errors may remain.

## 2017-05-20 NOTE — PROGRESS NOTES
"[facility] - Procedure Note    Date of Visit: 04/27/17    Indications: Doretha Fernandez is a 42-year-old female who is seen at the referral of Dr. Anna Naidu for left occipital nerve block. The patient reports pain of the left neck and head. She describes a \"squeezing\" pain from the base of the left skull into the head and eyeball. Her pain radiates into the left neck and upper trapezius. She has more recently noted pain radiating into the left arm. She notes some numbness, tingling, and generalized weakness of the extremity. Her head pain is not associated with nausea, vomiting, photophobia, or phonophobia. She denies balance/gait impairment. She reports limited improvement with conservative treatment.    Electronic Chart Review: Patient history, pertinent diagnostic studies, vitals, allergies, and medications were reviewed.    Review of Systems: The patient reports history of diaphoresis, although denies recent fever, chills, illness, use of antibiotics or anticoagulants. She completed a course of oral steroids over one week prior. She denies pregnancy. No allergy to local anesthetic or steroid.    Physical Examination:  /62  Wt 98.4 kg (217 lb)  LMP 04/06/2017 (Approximate)  BMI 38.44 kg/m2  GEN: Alert. Well developed, well nourished. No acute distress.  CV/Resp: Symmetric chest wall excursion. Non-labored breathing. No audible wheezing.  Skin: No rashes/lesions of scalp/upper torso.  Extremities: Distal extremities warm, well perfused.  Neuro/MSK: Tenderness of left superior nuchal ridge and suboccipital region. Power 5/5 throughout bilateral delts (slight give-way on left), biceps, triceps, pronator teres, WE, FDI, ADM, APB. Aranda negative.    Procedure Description:    Pre-procedure Diagnosis: Left occipital neuralgia/neuritis  Post-procedure Diagnosis: Left occipital neuralgia/neuritis  Procedure performed: Left greater/lesser occipital nerve branch block  : Matilde" "MD Denis    The procedure and risks were explained, and informed written consent was obtained from the patient. Risks include but are not limited to: increased pain, infection, bleeding, hair loss at injection site, and damage to soft tissue, nerve, muscle, and vasculature structures. Immediately prior to the start of the procedure, a \"time out\" safety check was conducted to confirm correct patient, procedure, and laterality. The patient was placed in a seated position. The skin was prepped with ChloraPrep. A mixture of solution containing 0.5 mL of 40 mg/mL Triamcinolone, 1.75 mL of 0.5% bupivacaine and 1.75 mL of 1% lidocaine (preservative free) was prepared. On the left side, the occipital nerve was identified at its origin along the superior nuchal line, midway between the mastoid process and the occipital protuberance. The patient reported pain upon compression at this point. The point of maximal tenderness was used as the entry site for injection. A 25-gauge, 1.5 inch needle was directed toward the occiput until bony resistance was felt. After negative aspiration or heme and air, 1 mL of solution was injected at this point. The tip of the needle was then withdrawn to just under the skin and redirected laterally to behind the ear, then medially to midline; following negative aspiration, the remaining 3 mL of solution was divided between each site. The needle was withdrawn. The area was subsequently massaged for improved distribution of anesthetic. The patient tolerated the procedure well, and there was no evidence of procedural complications. Post-procedure instructions were provided.    Pre-procedure pain score: 3/10 head, 4/10 neck/arm  Post-procedure pain score: 3/10 head, 4/10 neck/arm    Assessment/Plan: Doretha Fernandez is a 42-year-old female s/p left occipital nerve branch block today.    1. Following today's procedure, the patient was advised to contact the [facility] for any of " the following:   Fever, chills, night sweats, or other signs of infection   New onset of pain, numbness, or weakness   Any questions/concerns regarding the procedure      If unable to contact the Pain Center, the patient was instructed to go to a local Emergency Room for any complications.   2. If only partial relief, she may wish to follow up with her primary care provider for assessment of cervical spine etiology.     Matilde Barrios MD  [facility]

## 2017-05-23 ENCOUNTER — OFFICE VISIT (OUTPATIENT)
Dept: NEUROLOGY | Facility: CLINIC | Age: 42
End: 2017-05-23

## 2017-05-23 NOTE — MR AVS SNAPSHOT
After Visit Summary   5/23/2017    Doretha Fernandez    MRN: [id number]           Patient Information     Date Of Birth          1975        Visit Information        Provider Department      5/23/2017 12:00 PM Bairon Miranda MD [facility]         Follow-ups after your visit        Your next 10 appointments already scheduled     Raudel 15, 2017  7:40 AM CDT   [facility]   [facility]    [address]  [facility] [address]   [phone]            Jun 21, 2017  2:00 PM CDT   Return Visit with Bairon Miranda MD   [facility] ([facility])    [address]  [facility] [address] [phone]              Future tests that were ordered for you today     Open Future Orders        Priority Expected Expires Ordered    Surgical pathology exam Routine  9/11/2017 6/13/2017            Who to contact     Please call your clinic at [phone] to:    Ask questions about your health    Make or cancel appointments    Discuss your medicines    Learn about your test results    Speak to your doctor   If you have compliments or concerns about an experience at your clinic, or if you wish to file a complaint, please contact [facility] Patient Relations at [phone] or email us at [email or web address]         Additional Information About Your Visit        MyChart Information     [employer] gives you secure access to your electronic health record. If you see a primary care provider, you can also send messages to your care team and make appointments. If you have questions, please call your primary care clinic.  If you do not have a primary care provider, please call [phone] and they will assist you.      [employer] is an electronic gateway that provides easy,  online access to your medical records. With [employer] Every[employer], you can request a clinic appointment, read your test results, renew a prescription or communicate with your care team.     To access your existing account, please contact your [facility] or call [phone] for assistance.        Care EveryWhere ID     This is your Care EveryWhere ID. This could be used by other organizations to access your [place] medical records  [id number]         Blood Pressure from Last 3 Encounters:   No data found for BP    Weight from Last 3 Encounters:   No data found for Wt              Today, you had the following     No orders found for display       Primary Care Provider Office Phone # Fax #    Anna Naidu [phone]       [facility] [address] [facility] [address]        Thank you!     Thank you for choosing [facility]  for your care. Our goal is always to provide you with excellent care. Hearing back from our patients is one way we can continue to improve our services. Please take a few minutes to complete the written survey that you may receive in the mail after your visit with us. Thank you!             Your Updated Medication List - Protect others around you: Learn how to safely use, store and throw away your medicines at www.disposemymeds.org.          This list is accurate as of: 5/23/17 11:59 PM.  Always use your most recent med list.                   Brand Name Dispense Instructions for use    aspirin 81 MG EC tablet      Take 81 mg by mouth daily       cyclobenzaprine 10 MG tablet    FLEXERIL    42 tablet    Take 1 tablet (10 mg) by mouth 3 times daily as needed for muscle spasms       FLUoxetine 20 MG capsule    PROzac    270 capsule    Take 80 mg by mouth daily       fluticasone 50 MCG/ACT spray    FLONASE    1 g    Spray 2 sprays into both nostrils daily       gabapentin 300 MG capsule    NEURONTIN     90 capsule    Take 1 tablet (300 mg) every night for 1-3 days, then 1 tablet twice daily for 1-3 days, then 1 tablet three times daily       LORazepam 1 MG tablet    ATIVAN    30 tablet    Take 1 tablet (1 mg) by mouth every 8 hours as needed for anxiety       TYLENOL PO      Take 1,000 mg by mouth every 6 hours as needed for mild pain or fever

## 2017-05-24 DIAGNOSIS — G43.719 INTRACTABLE CHRONIC MIGRAINE WITHOUT AURA AND WITHOUT STATUS MIGRAINOSUS: Primary | ICD-10-CM

## 2017-05-24 RX ORDER — TOPIRAMATE 25 MG/1
TABLET, FILM COATED ORAL
Qty: 90 TABLET | Refills: 3 | Status: SHIPPED | OUTPATIENT
Start: 2017-05-24 | End: 2017-06-21

## 2017-05-25 DIAGNOSIS — H53.9 VISION CHANGES: Primary | ICD-10-CM

## 2017-06-02 ENCOUNTER — OFFICE VISIT (OUTPATIENT)
Dept: OPHTHALMOLOGY | Facility: CLINIC | Age: 42
End: 2017-06-02
Attending: OPHTHALMOLOGY
Payer: COMMERCIAL

## 2017-06-02 DIAGNOSIS — H53.9 VISION CHANGES: ICD-10-CM

## 2017-06-02 DIAGNOSIS — H53.10 SUBJECTIVE VISUAL DISTURBANCE: ICD-10-CM

## 2017-06-02 DIAGNOSIS — H53.40 VISUAL FIELD DEFECT: ICD-10-CM

## 2017-06-02 PROCEDURE — 92083 EXTENDED VISUAL FIELD XM: CPT | Mod: ZF | Performed by: OPHTHALMOLOGY

## 2017-06-02 PROCEDURE — 99214 OFFICE O/P EST MOD 30 MIN: CPT | Mod: 25,ZF

## 2017-06-02 ASSESSMENT — EXTERNAL EXAM - RIGHT EYE: OD_EXAM: NORMAL

## 2017-06-02 ASSESSMENT — EXTERNAL EXAM - LEFT EYE: OS_EXAM: NORMAL

## 2017-06-02 ASSESSMENT — CONF VISUAL FIELD
OS_NORMAL: 1
OD_NORMAL: 1

## 2017-06-02 ASSESSMENT — REFRACTION_WEARINGRX
OD_SPHERE: -0.50
OS_SPHERE: -1.75
SPECS_TYPE: SVL
OS_CYLINDER: +2.25
OD_AXIS: 010
OS_AXIS: 015
OD_CYLINDER: +0.75

## 2017-06-02 ASSESSMENT — VISUAL ACUITY
OD_CC: 20/20
OS_CC: 20/20
OD_CC+: -2
CORRECTION_TYPE: GLASSES
METHOD: SNELLEN - LINEAR
OS_CC+: -2
OS_CC: J1
OD_CC: J1+

## 2017-06-02 ASSESSMENT — CUP TO DISC RATIO
OD_RATIO: 0.1
OS_RATIO: 0.15

## 2017-06-02 ASSESSMENT — TONOMETRY
OD_IOP_MMHG: 13
OS_IOP_MMHG: 10
IOP_METHOD: TONOPEN

## 2017-06-02 ASSESSMENT — SLIT LAMP EXAM - LIDS: COMMENTS: NORMAL

## 2017-06-02 NOTE — PROGRESS NOTES
"ATTENDING ASSESSMENT AND PLAN:       1. Subjective visual disturbance likely a consequence of migraine visual aura- normal exam today:     Doretha Fernandez is a 42 year old female presenting for evaluation of constant left-sided headache and intermittent dark spots in her vision in the left eye since April 1st. She has a history of less severe migraine headaches, as well as a remote history of a left parietal stroke related to a prothrombin gene mutation.    On exam today, she has 20/20 acuity in both eyes, no APD, and full Ishihara plates. She had an unremarkable anterior segment aside from a left upper lid lesion concerning for possible basal cell caricinoma versus squamous cell carcinoma.  Dilated fundus exam was unremarkable in both eyes with healthy macula and optic nerves in both eyes.    OCT of the optic nerve and macula were normal in both eyes.  Octopus automated 30 degree visual field were reliable and full in both eyes.    Ms Fernandez symptoms and duration of symptoms appear consistent with an atypically long migraine visual aura. Her recent MRI brain in April 2017 did not show any new pathology aside from changes related to her remote stroke. She had a left occipital nerve block on 4/27/17 which has dampened the headache, and she was recently started on Topamax and Gabepentin but does not report significant effect at present. I recommended she continue to work with her neurologist as well as her primary care physician for management of her headache- today she had tenderness to palpation over the right occipital nerve region and a repeat injection on that side may be warranted.  Her neuro-ophthalmology was completely normal.    2. 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  This could be an unusual chronic \"burnt out\" chalazion but the appearance raises " concern for a local non-melanotic skin cancer.  Will refer her to Dr. Bosch for evaluation and possible biopsy.     I spent a total of 60 minutes face to face with Doretha Fernandez during today's office visit.  Over 50% of this time was spent counseling the patient and/or coordinating care regarding her vision loss episode in the left eye, headache, and left upper eyelid lesion concerning for cancer.    Complete documentation of historical and exam elements from today's encounter can be found in the full encounter summary report (not reduplicated in this progress note).  I personally obtained the chief complaint(s) and history of present illness.  I confirmed and edited as necessary the review of systems, past medical/surgical history, family history, social history, and examination findings as documented by others; and I examined the patient myself.  I personally reviewed the relevant tests, images, and reports as documented above.  I formulated and edited as necessary the assessment and plan and discussed the findings and management plan with the patient and family     Cheo Norton MD

## 2017-06-02 NOTE — NURSING NOTE
Chief Complaints and History of Present Illnesses   Patient presents with     New Patient     s/p Subjective visual disturbance; Visual field defect     HPI    Symptoms:     Blurred vision   Decreased vision   Distorted vision   No double vision   No floaters   No flashes      Frequency:  Constant       Do you have eye pain now?:  Yes   Location:  OS   Pain Duration:  3 months   Pain Frequency:  Intermittent   Pain Characteristics:  Stabbing, Aching      Comments:  Pt. constant headache since 3/1/2017, along with dark spots in temporal field of vision left more than right. Fascial numbness over the last 3 months.  Kirit Taylor  7:26 AM June 2, 2017

## 2017-06-02 NOTE — MR AVS SNAPSHOT
After Visit Summary   6/2/2017    Doretha Fernandez    MRN: [id number]           Patient Information     Date Of Birth          1975        Visit Information        Provider Department      6/2/2017 7:30 AM Cheo Norton MD [facility]        Today's Diagnoses     Vision changes        Subjective visual disturbance        Visual field defect           Follow-ups after your visit        Your next 10 appointments already scheduled     Jun 13, 2017  8:00 AM CDT   (Arrive by 7:45 AM)   NEW PLASTICS with Kisha Bosch MD   [facility] ([facility] [place])    [address]  [address]  [place] [address] [phone]            Jun 21, 2017  2:00 PM CDT   Return Visit with Bairon Miranda MD   [facility] ([facility])    [address]  [place] [facility] [address] [phone]              Who to contact     Please call your clinic at [phone] to:    Ask questions about your health    Make or cancel appointments    Discuss your medicines    Learn about your test results    Speak to your doctor   If you have compliments or concerns about an experience at your clinic, or if you wish to file a complaint, please contact [facility] Patient Relations at [phone] or email us at [email or web address]         Additional Information About Your Visit        MyChart Information     [employer] gives you secure access to your electronic health record. If you see a primary care provider, you can also send messages to your care team and make appointments. If you have questions, please call your primary care clinic.  If you do not have a primary care provider, please call [phone] and they will assist you.      [employer] is an electronic gateway that provides easy, online access to your medical records. With [employer], you can request a clinic  appointment, read your test results, renew a prescription or communicate with your care team.     To access your existing account, please contact your [facility] or call [phone] for assistance.        Care EveryWhere ID     This is your Care EveryWhere ID. This could be used by other organizations to access your [place] medical records  [id number]         Blood Pressure from Last 3 Encounters:   05/17/17 116/76   05/05/17 116/72   05/01/17 116/80    Weight from Last 3 Encounters:   05/17/17 95.1 kg (209 lb 11.2 oz)   05/05/17 96.3 kg (212 lb 4.8 oz)   05/01/17 98.4 kg (217 lb)              We Performed the Following     Glaucoma Top OU     OCT Optic Nerve RNFL Spectralis OU (both eyes)     Ophthalmology (Eye) Referral ([facility])        Primary Care Provider Office Phone # Fax #    Anna Naidu [phone]       [facility] [address] [address] [facility] [address]        Thank you!     Thank you for choosing EYE CLINIC  for your care. Our goal is always to provide you with excellent care. Hearing back from our patients is one way we can continue to improve our services. Please take a few minutes to complete the written survey that you may receive in the mail after your visit with us. Thank you!             Your Updated Medication List - Protect others around you: Learn how to safely use, store and throw away your medicines at www.disposemymeds.org.          This list is accurate as of: 6/2/17  9:37 AM.  Always use your most recent med list.                   Brand Name Dispense Instructions for use    * aspirin 81 MG EC tablet          * aspirin 325 MG EC tablet     30 tablet    Take 325 mg by mouth daily Reported on 5/17/2017       calcium carbonate 500 MG chewable tablet    TUMS     Take 1 chew tab by mouth daily as needed for heartburn Reported on 5/17/2017       cyclobenzaprine 10 MG tablet    FLEXERIL    42 tablet    Take 1 tablet (10 mg) by mouth 3  times daily as needed for muscle spasms       FLUoxetine 20 MG capsule    PROzac    270 capsule    Take 40 mg by mouth daily 12/6/16 -- reduced to 40 mg daily       fluticasone 50 MCG/ACT spray    FLONASE    1 g    Spray 2 sprays into both nostrils daily       gabapentin 300 MG capsule    NEURONTIN    90 capsule    Take 1 tablet (300 mg) every night for 1-3 days, then 1 tablet twice daily for 1-3 days, then 1 tablet three times daily       LORazepam 1 MG tablet    ATIVAN    30 tablet    Take 1 tablet (1 mg) by mouth every 8 hours as needed for anxiety       topiramate 25 MG tablet    TOPAMAX    90 tablet    Take 1 tablet (25 mg) at bedtime for 1 week, then 2 tablets at hs for 1 week, then 3 tablets thereafter       TYLENOL PO      Take 1,000 mg by mouth every 6 hours as needed for mild pain or fever       * Notice:  This list has 2 medication(s) that are the same as other medications prescribed for you. Read the directions carefully, and ask your doctor or other care provider to review them with you.

## 2017-06-02 NOTE — LETTER
2017    RE: Doretha Fernandez  : 1975  MRN: [id number]    Dear Dr. Miranda,    Thank you for referring your patient, Doretha Fernandez, to my neuro-ophthalmology clinic recently.  After a thorough neuro-ophthalmic history and examination, I came to the following conclusions:       1. Subjective visual disturbance likely a consequence of migraine visual aura- normal exam today:     Doretha Fernandez is a 42 year old female presenting for evaluation of constant left-sided headache and intermittent dark spots in her vision in the left eye since . She has a history of less severe migraine headaches, as well as a remote history of a left parietal stroke related to a prothrombin gene mutation.    On exam today, she has 20/20 acuity in both eyes, no APD, and full Ishihara plates. She had an unremarkable anterior segment aside from a left upper lid lesion concerning for possible basal cell caricinoma versus squamous cell carcinoma.  Dilated fundus exam was unremarkable in both eyes with healthy macula and optic nerves in both eyes.    OCT of the optic nerve and macula were normal in both eyes.  Octopus automated 30 degree visual field were reliable and full in both eyes.    Ms Fernandez symptoms and duration of symptoms appear consistent with an atypically long migraine visual aura. Her recent MRI brain in 2017 did not show any new pathology aside from changes related to her remote stroke. She had a left occipital nerve block on 17 which has dampened the headache, and she was recently started on Topamax and Gabepentin but does not report significant effect at present. I recommended she continue to work with her neurologist as well as her primary care physician for management of her headache- today she had tenderness to palpation over the right occipital nerve region and a repeat injection on that side may be warranted.  Her neuro-ophthalmology was completely normal.    2. Left upper eyelid lesion  "with madarosis and pearly appearance with telangiectatic vessels   She states that the lesion has been present for several years and has increased in size over the past year.  This could be an unusual chronic \"burnt out\" chalazion but the appearance raises concern for a local non-melanotic skin cancer.  Will refer her to Dr. Bosch for evaluation and possible biopsy.    Again, thank you for trusting me with the care of your patient.  For further exam details, please feel free to contact our office for additional records.  If you wish to contact me regarding this patient please email me at [email or web address] or give my clinic a call to arrange a phone conversation.    Sincerely,    Cheo Norton MD  , Neuro-Ophthalmology and Adult Strabismus  Department of Ophthalmology and Visual Neurosciences  [facility]    DX: atypical migraine visual aura, eyelid lesion      "

## 2017-06-05 ENCOUNTER — TELEPHONE (OUTPATIENT)
Dept: FAMILY MEDICINE | Facility: CLINIC | Age: 42
End: 2017-06-05

## 2017-06-05 ENCOUNTER — HOSPITAL ENCOUNTER (EMERGENCY)
Facility: CLINIC | Age: 42
Discharge: HOME | End: 2017-06-05
Attending: STUDENT IN AN ORGANIZED HEALTH CARE EDUCATION/TRAINING PROGRAM | Admitting: STUDENT IN AN ORGANIZED HEALTH CARE EDUCATION/TRAINING PROGRAM
Payer: COMMERCIAL

## 2017-06-05 VITALS
WEIGHT: 205 LBS | HEART RATE: 66 BPM | BODY MASS INDEX: 36.32 KG/M2 | HEIGHT: 63 IN | DIASTOLIC BLOOD PRESSURE: 71 MMHG | OXYGEN SATURATION: 97 % | SYSTOLIC BLOOD PRESSURE: 114 MMHG | RESPIRATION RATE: 16 BRPM | TEMPERATURE: 97.9 F

## 2017-06-05 DIAGNOSIS — M54.50 ACUTE BILATERAL LOW BACK PAIN WITHOUT SCIATICA: ICD-10-CM

## 2017-06-05 LAB
ALBUMIN SERPL-MCNC: 4 G/DL (ref 3.4–5)
ALBUMIN UR-MCNC: NEGATIVE MG/DL
ALP SERPL-CCNC: 60 U/L (ref 40–150)
ALT SERPL W P-5'-P-CCNC: 29 U/L (ref 0–50)
ANION GAP SERPL CALCULATED.3IONS-SCNC: 7 MMOL/L (ref 3–14)
APPEARANCE UR: CLEAR
AST SERPL W P-5'-P-CCNC: 21 U/L (ref 0–45)
BASOPHILS # BLD AUTO: 0 10E9/L (ref 0–0.2)
BASOPHILS NFR BLD AUTO: 0.2 %
BILIRUB SERPL-MCNC: 0.1 MG/DL (ref 0.2–1.3)
BILIRUB UR QL STRIP: NEGATIVE
BUN SERPL-MCNC: 15 MG/DL (ref 7–30)
CALCIUM SERPL-MCNC: 8.9 MG/DL (ref 8.5–10.1)
CHLORIDE SERPL-SCNC: 109 MMOL/L (ref 94–109)
CO2 SERPL-SCNC: 23 MMOL/L (ref 20–32)
COLOR UR AUTO: ABNORMAL
CREAT SERPL-MCNC: 0.71 MG/DL (ref 0.52–1.04)
DIFFERENTIAL METHOD BLD: NORMAL
EOSINOPHIL # BLD AUTO: 0.2 10E9/L (ref 0–0.7)
EOSINOPHIL NFR BLD AUTO: 2 %
ERYTHROCYTE [DISTWIDTH] IN BLOOD BY AUTOMATED COUNT: 13.9 % (ref 10–15)
GFR SERPL CREATININE-BSD FRML MDRD: ABNORMAL ML/MIN/1.7M2
GLUCOSE SERPL-MCNC: 82 MG/DL (ref 70–99)
GLUCOSE UR STRIP-MCNC: NEGATIVE MG/DL
HCG UR QL: NEGATIVE
HCT VFR BLD AUTO: 36.9 % (ref 35–47)
HGB BLD-MCNC: 12.3 G/DL (ref 11.7–15.7)
HGB UR QL STRIP: NEGATIVE
IMM GRANULOCYTES # BLD: 0 10E9/L (ref 0–0.4)
IMM GRANULOCYTES NFR BLD: 0.5 %
KETONES UR STRIP-MCNC: NEGATIVE MG/DL
LEUKOCYTE ESTERASE UR QL STRIP: NEGATIVE
LIPASE SERPL-CCNC: 89 U/L (ref 73–393)
LYMPHOCYTES # BLD AUTO: 2.7 10E9/L (ref 0.8–5.3)
LYMPHOCYTES NFR BLD AUTO: 33.7 %
MCH RBC QN AUTO: 29.2 PG (ref 26.5–33)
MCHC RBC AUTO-ENTMCNC: 33.3 G/DL (ref 31.5–36.5)
MCV RBC AUTO: 88 FL (ref 78–100)
MONOCYTES # BLD AUTO: 0.5 10E9/L (ref 0–1.3)
MONOCYTES NFR BLD AUTO: 6.3 %
NEUTROPHILS # BLD AUTO: 4.6 10E9/L (ref 1.6–8.3)
NEUTROPHILS NFR BLD AUTO: 57.3 %
NITRATE UR QL: NEGATIVE
PH UR STRIP: 6.5 PH (ref 5–7)
PLATELET # BLD AUTO: 294 10E9/L (ref 150–450)
POTASSIUM SERPL-SCNC: 3.8 MMOL/L (ref 3.4–5.3)
PROT SERPL-MCNC: 7.7 G/DL (ref 6.8–8.8)
RBC # BLD AUTO: 4.21 10E12/L (ref 3.8–5.2)
SODIUM SERPL-SCNC: 139 MMOL/L (ref 133–144)
SP GR UR STRIP: 1 (ref 1–1.03)
URN SPEC COLLECT METH UR: ABNORMAL
UROBILINOGEN UR STRIP-MCNC: NORMAL MG/DL (ref 0–2)
WBC # BLD AUTO: 8.1 10E9/L (ref 4–11)

## 2017-06-05 PROCEDURE — 25000128 H RX IP 250 OP 636: Performed by: STUDENT IN AN ORGANIZED HEALTH CARE EDUCATION/TRAINING PROGRAM

## 2017-06-05 PROCEDURE — 81003 URINALYSIS AUTO W/O SCOPE: CPT | Performed by: STUDENT IN AN ORGANIZED HEALTH CARE EDUCATION/TRAINING PROGRAM

## 2017-06-05 PROCEDURE — 96361 HYDRATE IV INFUSION ADD-ON: CPT | Mod: 59

## 2017-06-05 PROCEDURE — 80053 COMPREHEN METABOLIC PANEL: CPT | Performed by: STUDENT IN AN ORGANIZED HEALTH CARE EDUCATION/TRAINING PROGRAM

## 2017-06-05 PROCEDURE — 85025 COMPLETE CBC W/AUTO DIFF WBC: CPT | Performed by: STUDENT IN AN ORGANIZED HEALTH CARE EDUCATION/TRAINING PROGRAM

## 2017-06-05 PROCEDURE — 83690 ASSAY OF LIPASE: CPT | Performed by: STUDENT IN AN ORGANIZED HEALTH CARE EDUCATION/TRAINING PROGRAM

## 2017-06-05 PROCEDURE — 99284 EMERGENCY DEPT VISIT MOD MDM: CPT | Performed by: STUDENT IN AN ORGANIZED HEALTH CARE EDUCATION/TRAINING PROGRAM

## 2017-06-05 PROCEDURE — 96374 THER/PROPH/DIAG INJ IV PUSH: CPT

## 2017-06-05 PROCEDURE — 81025 URINE PREGNANCY TEST: CPT | Performed by: STUDENT IN AN ORGANIZED HEALTH CARE EDUCATION/TRAINING PROGRAM

## 2017-06-05 PROCEDURE — 99284 EMERGENCY DEPT VISIT MOD MDM: CPT | Mod: 25

## 2017-06-05 RX ORDER — KETOROLAC TROMETHAMINE 30 MG/ML
20 INJECTION, SOLUTION INTRAMUSCULAR; INTRAVENOUS ONCE
Status: COMPLETED | OUTPATIENT
Start: 2017-06-05 | End: 2017-06-05

## 2017-06-05 RX ORDER — HYDROCODONE BITARTRATE AND ACETAMINOPHEN 5; 325 MG/1; MG/1
1-2 TABLET ORAL EVERY 4 HOURS PRN
Qty: 12 TABLET | Refills: 0 | Status: SHIPPED | OUTPATIENT
Start: 2017-06-05 | End: 2017-07-21

## 2017-06-05 RX ORDER — METHOCARBAMOL 750 MG/1
750 TABLET, FILM COATED ORAL 3 TIMES DAILY PRN
Qty: 30 TABLET | Refills: 0 | Status: SHIPPED | OUTPATIENT
Start: 2017-06-05 | End: 2017-09-12

## 2017-06-05 RX ORDER — LIDOCAINE 40 MG/G
CREAM TOPICAL
Status: DISCONTINUED | OUTPATIENT
Start: 2017-06-05 | End: 2017-06-05 | Stop reason: HOSPADM

## 2017-06-05 RX ADMIN — KETOROLAC TROMETHAMINE 20 MG: 30 INJECTION, SOLUTION INTRAMUSCULAR at 14:40

## 2017-06-05 RX ADMIN — SODIUM CHLORIDE 1000 ML: 9 INJECTION, SOLUTION INTRAVENOUS at 14:39

## 2017-06-05 NOTE — ED PROVIDER NOTES
History     Chief Complaint   Patient presents with     Flank Pain     bilat flank pain that radiates to RLQ and LLQ.  onset last NOC     HPI  Doretha Fernandez is a 42 year old female with documented past medical history which includes anxiety, depression, non-intractable tension-type headaches, prothrombin mutation, and dysfunctional uterine bleeding who presents for evaluation of lumbar region back pain. Patient explains that she developed achy low back pain last evening while sitting on her couch, pain radiates bilaterally across the low back and lateral aspects, unrelieved with acetaminophen at home. She denies recent injury, change in activity or exertion, urinary retention, fecal incontinence, sensory deficits or focal weakness. Patient also specifically denies fever/chills, abdominal pain, dysuria, or other concerning symptoms. Of note, patient is currently demonstrating.    Patient denies the following Red Flags of back pain:     Age > 50 years   PMH cancer   Unexplained weight loss   Immunosuppression   IV drug use   Recent trauma   Pain > 6 weeks      I have reviewed the Medications, Allergies, Past Medical and Surgical History, and Social History in the Epic system.    Patient Active Problem List   Diagnosis     CARDIOVASCULAR SCREENING; LDL GOAL LESS THAN 160     DUB (dysfunctional uterine bleeding)     Anxiety state     Esophageal reflux     Mild major depression (H)     Mild intermittent asthma without complication     Vision changes     Acute non intractable tension-type headache     Prothrombin mutation (H)       No past surgical history on file.    Social History     Social History     Marital status:      Spouse name: N/A     Number of children: N/A     Years of education: N/A     Occupational History     Not on file.     Social History Main Topics     Smoking status: Passive Smoke Exposure - Never Smoker     Last attempt to quit: 3/20/1998     Smokeless tobacco: Never Used     Alcohol use  Yes      Comment: occ     Drug use: No     Sexual activity: Yes     Partners: Male     Birth control/ protection: Surgical     Other Topics Concern     Parent/Sibling W/ Cabg, Mi Or Angioplasty Before 65f 55m? No     Social History Narrative    19 y.o- patient's mother   of lung cancer. She had to take care of her younger sister.    2012- patient's  had a heart attack with stents placed, followed by cardiac rehabilitation    2000 TO 2012  was in [facility] for depression    2013 patient's  went through alcohol rehabilitation at [facility]            They have attended couple counseling a couple of times and patient went to the family program for chemical dependency.    Patient denies alcohol or drug use and herself            Has 2 children, girls ages 5 and 8    [employer] and also works for the [employer]. 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               Family History   Problem Relation Age of Onset     CANCER Mother 45     lung     Neurologic Disorder Mother      Lipids Father      GASTROINTESTINAL DISEASE Father      Depression Father      CANCER Maternal Grandmother      Blood Disease Maternal Grandmother      Arthritis Maternal Grandmother      DIABETES Maternal Grandmother      Depression Maternal Grandmother      DIABETES Maternal Grandfather      CEREBROVASCULAR DISEASE Maternal Grandfather      Blood Disease Maternal Grandfather      HEART DISEASE Maternal Grandfather      CANCER Paternal Grandmother      Cancer - colorectal Paternal Grandmother      Respiratory Paternal Grandfather      Blood Disease Paternal Grandfather      HEART DISEASE Daughter      Asthma Daughter      Depression Sister        Most Recent Immunizations   Administered Date(s) Administered     Influenza (IIV3) 10/07/2011     Influenza Vaccine IM 3yrs+ 4 Valent IIV4 2016     TDAP Vaccine (Adacel) 2012  "        Review of Systems  Constitutional: Negative for fever or chills.  Respiratory: Negative for cough or shortness of breath.  Cardiovascular: Negative for chest pain.  Gastrointestinal: Negative for abdominal pain, vomiting, diarrhea, blood with bowel movements, or fecal incontinence.  Genitourinary: Negative for dysuria, hematuria, urinary retention, or pelvic pain.  Musculoskeletal: Positive for achy lumbar region back pain. Negative for recent injuries.  Neurological: Negative for sensory deficits or weakness.  Skin: Negative for rash.    All others reviewed and are negative.      Physical Exam   BP: 133/84  Pulse: 77  Temp: 97.9  F (36.6  C)  Resp: 18  Height: 160 cm (5' 3\")  Weight: 93 kg (205 lb)  SpO2: 99 %  Physical Exam  Constitutional: Well developed, well nourished. Appears nontoxic but moderate discomfort secondary to low back pain.  Head: Normocephalic and atraumatic. Symmetric in appearance.  Eyes: Conjunctivae are normal.  Neck: Neck supple.  Cardiovascular: No cyanosis. RRR. No audible murmurs noted. No lower extremity edema or asymmetry.   Respiratory: Effort normal, no respiratory distress. CTAB without diminished regions. No wheezing, rhonchi, or crackles.  Gastrointestinal: Soft, nondistended abdomen. Nontender and without guarding. No rigidity or rebound tenderness. Negative McBurney's point. Negative for Mandel's sign. No palpable pulsatile mass. Benign abdomen.   Musculoskeletal: No step-offs noted and no tenderness to palpation of midline cervical, thoracic, or lumbosacral vertebra. Reproducible tenderness to palpation of lumbar paraspinal musculature. Hip flexion, knee extension, dorsiflexion/extensor halliucis (L5), and plantar flexion (S1) intact and equal bilaterally. Sensation of medial leg, dorsum of foot, and planter aspect of foot are intact bilaterally and without evidence of saddle anesthesia.   Neuro: Patient is alert.  Skin: Skin is warm and dry, not diaphoretic.      ED " Course     ED Course     Procedures            Critical Care time:  none                Results for orders placed or performed during the hospital encounter of 06/05/17 (from the past 24 hour(s))   HCG qualitative urine   Result Value Ref Range    HCG Qual Urine Negative NEG   UA reflex to Microscopic   Result Value Ref Range    Color Urine Straw     Appearance Urine Clear     Glucose Urine Negative NEG mg/dL    Bilirubin Urine Negative NEG    Ketones Urine Negative NEG mg/dL    Specific Gravity Urine 1.002 (L) 1.003 - 1.035    Blood Urine Negative NEG    pH Urine 6.5 5.0 - 7.0 pH    Protein Albumin Urine Negative NEG mg/dL    Urobilinogen mg/dL Normal 0.0 - 2.0 mg/dL    Nitrite Urine Negative NEG    Leukocyte Esterase Urine Negative NEG    Source Midstream Urine    CBC with platelets differential   Result Value Ref Range    WBC 8.1 4.0 - 11.0 10e9/L    RBC Count 4.21 3.8 - 5.2 10e12/L    Hemoglobin 12.3 11.7 - 15.7 g/dL    Hematocrit 36.9 35.0 - 47.0 %    MCV 88 78 - 100 fl    MCH 29.2 26.5 - 33.0 pg    MCHC 33.3 31.5 - 36.5 g/dL    RDW 13.9 10.0 - 15.0 %    Platelet Count 294 150 - 450 10e9/L    Diff Method Automated Method     % Neutrophils 57.3 %    % Lymphocytes 33.7 %    % Monocytes 6.3 %    % Eosinophils 2.0 %    % Basophils 0.2 %    % Immature Granulocytes 0.5 %    Absolute Neutrophil 4.6 1.6 - 8.3 10e9/L    Absolute Lymphocytes 2.7 0.8 - 5.3 10e9/L    Absolute Monocytes 0.5 0.0 - 1.3 10e9/L    Absolute Eosinophils 0.2 0.0 - 0.7 10e9/L    Absolute Basophils 0.0 0.0 - 0.2 10e9/L    Abs Immature Granulocytes 0.0 0 - 0.4 10e9/L   Comprehensive metabolic panel   Result Value Ref Range    Sodium 139 133 - 144 mmol/L    Potassium 3.8 3.4 - 5.3 mmol/L    Chloride 109 94 - 109 mmol/L    Carbon Dioxide 23 20 - 32 mmol/L    Anion Gap 7 3 - 14 mmol/L    Glucose 82 70 - 99 mg/dL    Urea Nitrogen 15 7 - 30 mg/dL    Creatinine 0.71 0.52 - 1.04 mg/dL    GFR Estimate >90  Non  GFR Calc   >60 mL/min/1.7m2     GFR Estimate If Black >90   GFR Calc   >60 mL/min/1.7m2    Calcium 8.9 8.5 - 10.1 mg/dL    Bilirubin Total 0.1 (L) 0.2 - 1.3 mg/dL    Albumin 4.0 3.4 - 5.0 g/dL    Protein Total 7.7 6.8 - 8.8 g/dL    Alkaline Phosphatase 60 40 - 150 U/L    ALT 29 0 - 50 U/L    AST 21 0 - 45 U/L   Lipase   Result Value Ref Range    Lipase 89 73 - 393 U/L         Assessments & Plan (with Medical Decision Making)   Doretha Fernandez is a 42 year old female who presents to the department for complaint of low back pain radiating bilaterally outwards, symptoms began last night the patient had a difficult time sleeping secondary to the pain. She received a dose of Toradol in the department and her pain moderately improved. She does have reproducible tenderness of low back musculature, no CVA tenderness, urinalysis without blood or sign of infection. Patient's CBC is without leukocytosis and she has a benign abdominal examination, no symptoms of nausea/vomiting, diarrhea, or blood bowel movements.    Clinical impression is that she is likely suffering from musculoskeletal low back pain, cannot rule out herniated disc, radiographic imaging likely to be of low yield without recent injury. Recommend conservative management with short course of analgesic medication, however she should follow up with primary provider if symptoms do not readily improve over the next 5 days. Prior to discharge, I made it clear that illness can unexpectedly develop/progress so she has been instructed to return to the emergency department for reevaluation of evolving symptoms, change in severity, neurologic deficits, or other concerns. She seems comfortable with the discharge plan we discussed including follow up.    Disclaimer: This note consists of symbols derived from keyboarding, dictation, and/or voice recognition software. As a result, there may be errors in the script that have gone undetected.  Please consider this when interpreting information  found in the chart.        I have reviewed the nursing notes.    I have reviewed the findings, diagnosis, plan and need for follow up with the patient.    Discharge Medication List as of 6/5/2017  4:17 PM      START taking these medications    Details   HYDROcodone-acetaminophen (NORCO) 5-325 MG per tablet Take 1-2 tablets by mouth every 4 hours as needed for moderate to severe pain, Disp-12 tablet, R-0, Local Print      methocarbamol (ROBAXIN) 750 MG tablet Take 1 tablet (750 mg) by mouth 3 times daily as needed for other (Back tightness), Disp-30 tablet, R-0, Local Print             Final diagnoses:   Acute bilateral low back pain without sciatica       6/5/2017   [facility] EMERGENCY DEPARTMENT     Mauro Swanson DO  06/05/17 3212

## 2017-06-05 NOTE — ED AVS SNAPSHOT
[facility] Emergency Department    [address] [address]    Phone:  [phone]    Fax:  [phone]                                       Doretha Fernandez   MRN: [id number]    Department:  [facility] Emergency Department   Date of Visit:  6/5/2017           After Visit Summary Signature Page     I have received my discharge instructions, and my questions have been answered. I have discussed any challenges I see with this plan with the nurse or doctor.    ..........................................................................................................................................  Patient/Patient Representative Signature      ..........................................................................................................................................  Patient Representative Print Name and Relationship to Patient    ..................................................               ................................................  Date                                            Time    ..........................................................................................................................................  Reviewed by Signature/Title    ...................................................              ..............................................  Date                                                            Time

## 2017-06-05 NOTE — TELEPHONE ENCOUNTER
Patient states she is having back pain in the small of her back since yesterday. She does not recall hurting it at all. She does have her period but states it does not feel like cramps. She rates this pain 8/10. She states she just started Topamax and googled this and saw that it could cause kidney stones. She denies any other urinary symptoms. She will go to [facility] to be evaluated as she was in tears from the pain.    Trang Alatorre RN

## 2017-06-05 NOTE — ED AVS SNAPSHOT
[facility] Emergency Department    [address]    [facility] [place] [address]    Phone:  [phone]    Fax:  [phone]                                       Doretha Fernandez   MRN: [id number]    Department:  [facility] Emergency Department   Date of Visit:  6/5/2017           Patient Information     Date Of Birth          1975        Your diagnoses for this visit were:     Acute bilateral low back pain without sciatica        You were seen by Mauro Swanson DO.      Follow-up Information     Follow up with Anna Naidu MD. Schedule an appointment as soon as possible for a visit in 5 days.    Specialty:  Family Practice    Why:  Followup for reevaluation if symptoms persist.    Contact information:    [facility]  [address] [address]  [phone]        Discharge References/Attachments     BACK CARE TIPS (ENGLISH)    BACK EXERCISES, LUMBAR (ENGLISH)      Future Appointments        Provider Department Dept Phone Center    6/13/2017 8:00 AM Kisha Bosch MD [facility] Ophthalmology [phone] [facility]    6/21/2017 2:00 PM Bairon Miranda MD [facility] [phone] [facility] Own[facility]      24 Hour Appointment Hotline       To make an appointment at any [facility], call [phone] ([phone]). If you don't have a family doctor or clinic, we will help you find one. [place] clinics are conveniently located to serve the needs of you and your family.             Review of your medicines      START taking        Dose / Directions Last dose taken    HYDROcodone-acetaminophen 5-325 MG per tablet   Commonly known as:  NORCO   Dose:  1-2 tablet   Quantity:  12 tablet        Take 1-2 tablets by mouth every 4 hours as needed for moderate to severe pain   Refills:  0        methocarbamol 750 MG tablet   Commonly known as:  ROBAXIN   Dose:  750 mg   Quantity:  30 tablet        Take 1 tablet (750 mg) by mouth 3 times  daily as needed for other (Back tightness)   Refills:  0          Our records show that you are taking the medicines listed below. If these are incorrect, please call your family doctor or clinic.        Dose / Directions Last dose taken    aspirin 81 MG EC tablet   Dose:  81 mg        Take 81 mg by mouth daily   Refills:  0        cyclobenzaprine 10 MG tablet   Commonly known as:  FLEXERIL   Dose:  10 mg   Quantity:  42 tablet        Take 1 tablet (10 mg) by mouth 3 times daily as needed for muscle spasms   Refills:  0        FLUoxetine 20 MG capsule   Commonly known as:  PROzac   Dose:  80 mg   Quantity:  270 capsule        Take 80 mg by mouth daily   Refills:  01        fluticasone 50 MCG/ACT spray   Commonly known as:  FLONASE   Dose:  2 spray   Quantity:  1 g        Spray 2 sprays into both nostrils daily   Refills:  3        gabapentin 300 MG capsule   Commonly known as:  NEURONTIN   Quantity:  90 capsule        Take 1 tablet (300 mg) every night for 1-3 days, then 1 tablet twice daily for 1-3 days, then 1 tablet three times daily   Refills:  0        LORazepam 1 MG tablet   Commonly known as:  ATIVAN   Dose:  1 mg   Quantity:  30 tablet        Take 1 tablet (1 mg) by mouth every 8 hours as needed for anxiety   Refills:  0        topiramate 25 MG tablet   Commonly known as:  TOPAMAX   Quantity:  90 tablet        Take 1 tablet (25 mg) at bedtime for 1 week, then 2 tablets at hs for 1 week, then 3 tablets thereafter   Refills:  3        TYLENOL PO   Dose:  1000 mg        Take 1,000 mg by mouth every 6 hours as needed for mild pain or fever   Refills:  0                Prescriptions were sent or printed at these locations (2 Prescriptions)                   Other Prescriptions                Printed at Department/Unit printer (2 of 2)         HYDROcodone-acetaminophen (NORCO) 5-325 MG per tablet               methocarbamol (ROBAXIN) 750 MG tablet                Procedures and tests performed during your visit      CBC with platelets differential    Comprehensive metabolic panel    HCG qualitative urine    Lipase    Peripheral IV: Standard    Pulse oximetry nursing    UA reflex to Microscopic    Vital signs      Orders Needing Specimen Collection     None      Pending Results     No orders found from 6/3/2017 to 6/6/2017.            Pending Culture Results     No orders found from 6/3/2017 to 6/6/2017.            Pending Results Instructions     If you had any lab results that were not finalized at the time of your Discharge, you can call the ED Lab Result RN at [phone]. You will be contacted by this team for any positive Lab results or changes in treatment. The nurses are available 7 days a week from 10A to 6:30P.  You can leave a message 24 hours per day and they will return your call.        Test Results From Your Hospital Stay        6/5/2017  2:42 PM      Component Results     Component Value Ref Range & Units Status    WBC 8.1 4.0 - 11.0 10e9/L Final    RBC Count 4.21 3.8 - 5.2 10e12/L Final    Hemoglobin 12.3 11.7 - 15.7 g/dL Final    Hematocrit 36.9 35.0 - 47.0 % Final    MCV 88 78 - 100 fl Final    MCH 29.2 26.5 - 33.0 pg Final    MCHC 33.3 31.5 - 36.5 g/dL Final    RDW 13.9 10.0 - 15.0 % Final    Platelet Count 294 150 - 450 10e9/L Final    Diff Method Automated Method  Final    % Neutrophils 57.3 % Final    % Lymphocytes 33.7 % Final    % Monocytes 6.3 % Final    % Eosinophils 2.0 % Final    % Basophils 0.2 % Final    % Immature Granulocytes 0.5 % Final    Absolute Neutrophil 4.6 1.6 - 8.3 10e9/L Final    Absolute Lymphocytes 2.7 0.8 - 5.3 10e9/L Final    Absolute Monocytes 0.5 0.0 - 1.3 10e9/L Final    Absolute Eosinophils 0.2 0.0 - 0.7 10e9/L Final    Absolute Basophils 0.0 0.0 - 0.2 10e9/L Final    Abs Immature Granulocytes 0.0 0 - 0.4 10e9/L Final         6/5/2017  3:05 PM      Component Results     Component Value Ref Range & Units Status    Sodium 139 133 - 144 mmol/L Final    Potassium 3.8 3.4 - 5.3  mmol/L Final    Chloride 109 94 - 109 mmol/L Final    Carbon Dioxide 23 20 - 32 mmol/L Final    Anion Gap 7 3 - 14 mmol/L Final    Glucose 82 70 - 99 mg/dL Final    Urea Nitrogen 15 7 - 30 mg/dL Final    Creatinine 0.71 0.52 - 1.04 mg/dL Final    GFR Estimate >90  Non  GFR Calc   >60 mL/min/1.7m2 Final    GFR Estimate If Black >90   GFR Calc   >60 mL/min/1.7m2 Final    Calcium 8.9 8.5 - 10.1 mg/dL Final    Bilirubin Total 0.1 (L) 0.2 - 1.3 mg/dL Final    Albumin 4.0 3.4 - 5.0 g/dL Final    Protein Total 7.7 6.8 - 8.8 g/dL Final    Alkaline Phosphatase 60 40 - 150 U/L Final    ALT 29 0 - 50 U/L Final    AST 21 0 - 45 U/L Final         6/5/2017  3:03 PM      Component Results     Component Value Ref Range & Units Status    Lipase 89 73 - 393 U/L Final         6/5/2017  2:39 PM      Component Results     Component Value Ref Range & Units Status    HCG Qual Urine Negative NEG Final         6/5/2017  2:37 PM      Component Results     Component Value Ref Range & Units Status    Color Urine Straw  Final    Appearance Urine Clear  Final    Glucose Urine Negative NEG mg/dL Final    Bilirubin Urine Negative NEG Final    Ketones Urine Negative NEG mg/dL Final    Specific Gravity Urine 1.002 (L) 1.003 - 1.035 Final    Blood Urine Negative NEG Final    pH Urine 6.5 5.0 - 7.0 pH Final    Protein Albumin Urine Negative NEG mg/dL Final    Urobilinogen mg/dL Normal 0.0 - 2.0 mg/dL Final    Nitrite Urine Negative NEG Final    Leukocyte Esterase Urine Negative NEG Final    Source Midstream Urine  Final                Thank you for choosing [facility]       Thank you for choosing [facility] for your care. Our goal is always to provide you with excellent care. Hearing back from our patients is one way we can continue to improve our services. Please take a few minutes to complete the written survey that you may receive in the mail after you visit with us. Thank you!        MyChart Information     MyChart  gives you secure access to your electronic health record. If you see a primary care provider, you can also send messages to your care team and make appointments. If you have questions, please call your primary care clinic.  If you do not have a primary care provider, please call [phone] and they will assist you.        Care EveryWhere ID     This is your Care EveryWhere ID. This could be used by other organizations to access your [place] medical records  [id number]        After Visit Summary       This is your record. Keep this with you and show to your community pharmacist(s) and doctor(s) at your next visit.

## 2017-06-05 NOTE — TELEPHONE ENCOUNTER
Reason for call:  Patient reporting a symptom    Symptom or request: Pt has back pain today and she thinks it could be a kidney stone from her meds.  She has no urinary symptoms or any other symptoms, except she does have her period.      Duration (how long have symptoms been present): today    Have you been treated for this before? No    Additional comments:     Phone Number patient can be reached at:  Cell number on file:    Telephone Information:   Mobile [phone]     Best Time:  any    Can we leave a detailed message on this number:  YES    Call taken on 6/5/2017 at 1:23 PM by Jena Quintero

## 2017-06-13 ENCOUNTER — OFFICE VISIT (OUTPATIENT)
Dept: OPHTHALMOLOGY | Facility: CLINIC | Age: 42
End: 2017-06-13

## 2017-06-13 ENCOUNTER — TRANSFERRED RECORDS (OUTPATIENT)
Dept: HEALTH INFORMATION MANAGEMENT | Facility: CLINIC | Age: 42
End: 2017-06-13

## 2017-06-13 ENCOUNTER — TELEPHONE (OUTPATIENT)
Dept: FAMILY MEDICINE | Facility: CLINIC | Age: 42
End: 2017-06-13

## 2017-06-13 DIAGNOSIS — H02.9 EYELID LESION: Primary | ICD-10-CM

## 2017-06-13 DIAGNOSIS — C44.91 BASAL CELL CARCINOMA: ICD-10-CM

## 2017-06-13 RX ORDER — BACITRACIN 500 [USP'U]/G
0.25 OINTMENT OPHTHALMIC 2 TIMES DAILY
Qty: 1 TUBE | Refills: 0 | Status: SHIPPED | OUTPATIENT
Start: 2017-06-13 | End: 2017-06-20

## 2017-06-13 RX ORDER — LIDOCAINE HCL/EPINEPHRINE/PF 2%-1:200K
1 VIAL (ML) INJECTION ONCE
Qty: 1 ML | Refills: 0
Start: 2017-06-13 | End: 2017-06-13

## 2017-06-13 ASSESSMENT — VISUAL ACUITY
OD_CC: 20/20
OS_CC: 20/50
OS_PH_CC: 20/25
OS_CC+: -1
CORRECTION_TYPE: GLASSES
METHOD: SNELLEN - LINEAR

## 2017-06-13 ASSESSMENT — CONF VISUAL FIELD
OS_NORMAL: 1
OD_NORMAL: 1

## 2017-06-13 ASSESSMENT — TONOMETRY
IOP_METHOD: ICARE
OD_IOP_MMHG: 13
OS_IOP_MMHG: 14

## 2017-06-13 ASSESSMENT — EXTERNAL EXAM - RIGHT EYE: OD_EXAM: NORMAL

## 2017-06-13 ASSESSMENT — EXTERNAL EXAM - LEFT EYE: OS_EXAM: NORMAL

## 2017-06-13 NOTE — NURSING NOTE
"Chief Complaints and History of Present Illnesses   Patient presents with     Consult For     left upper eyelid lesion with madarosis; referred by Dr. Norton     HPI    Affected eye(s):  Left   Symptoms:     Blurred vision (Comment: left eye)   Floaters (Comment: notes a \"dark spot\" missing out of the [facility] of her left eye)   Flashes   Itching (Comment: left eyelid)      Duration:  2 months      Do you have eye pain now?:  Yes   Location:  OS   Pain Level:  Mild Pain (3)   Pain Frequency:  Constant      Comments:  Referred by Dr. Norton for left upper eyelid lesion - pt states she has noted it x a couple years but recently has been getting infected - pt had to stop wearing makeup  Pt states her eyelid doesn't feel right  On April 1, 2017, pt states her vision went completely blurred and she has had a constant headache since then  Pt states she has had ocular migraines in the past, but current episode is different than what was noted previously  Pt's sister states that pt had styes frequently as a child    Halley CENTENO 8:11 AM June 13, 2017              "

## 2017-06-13 NOTE — TELEPHONE ENCOUNTER
S-(situation): low back pain    B-(background): patient has had low back pain off and on for a couple of months.  Not getting any better.  ED was not helpful.  She is taking muscle relaxer and norco.  She is also having headaches.  She would like to be seen by Dr. MARLEEN Harris or Dr. Naidu.     A-(assessment): low back pain.      R-(recommendations): offered appts today with other providers.  She declines.  Appointment made for 6/15 with Dr. Harris.      Rupal Fleming RN

## 2017-06-13 NOTE — LETTER
" 2017         RE:  :  MRN: Doretha Fernandez  1975  [id number]     Dear Dr. Norton,    Thank you for asking me to see your patient, Doretha Fernandez, for an oculoplastic consultation.  My assessment and plan are below.  For further details, please see my attached clinic note.      Chief Complaints and History of Present Illnesses   Patient presents with     Consult For       left upper eyelid lesion with madarosis; referred by Dr. Norton      Doretha Fernandez is being referred by Dr. Norton for a left upper eyelid lesion. It has been present for several years. It does occasionally bleed. She feels it gets \"infected,\" particularly when she uses makeup. It has very gradually enlarged. She has no prior history of cutaneous malignancy. There is no associated pain. It will occasionally get inflamed.      Assessment & Plan     Doretha Fernandez is a 42 year old female with the following diagnoses:   1. Eyelid lesion    Left upper eyelid  I agree with Dr. Norton's assessment, this has many concerning features for basal cell carcinoma.    We will obtain a a photograph to document its location, and biopsy today, and if the diagnosis is confirmed, we will proceed with Mohs and reconstruction.        Again, thank you for allowing me to participate in the care of your patient.      Sincerely,    Kisha Bosch MD  Department of Ophthalmology and Visual Neurosciences  [facility]    CC: Cheo Norton MD  [facility] [[email or web address]  [address] [address]  VIA In Basket     Bairon Miranda MD  [facility] Neurology  [address] [address]  VIA In Basket     "

## 2017-06-13 NOTE — PROGRESS NOTES
"Chief Complaints and History of Present Illnesses   Patient presents with     Consult For     left upper eyelid lesion with madarosis; referred by Dr. Norton     Doretha Fernandez is being referred by Dr. Norton for a left upper eyelid lesion. It has been present for several years. It does occasionally bleed. She feels it gets \"infected,\" particularly when she uses makeup. It has very gradually enlarged. She has no prior history of cutaneous malignancy. There is no associated pain. It will occasionally get inflamed.         Assessment & Plan     Doretha Fernandez is a 42 year old female with the following diagnoses:   1. Eyelid lesion    Left upper eyelid  I agree with Dr. Norton's assessment, this has many concerning features for basal cell carcinoma.    We will obtain a a photograph to document its location, and biopsy today, and if the diagnosis is confirmed, we will proceed with Mohs and reconstruction.         Complete documentation of historical and exam elements from today's encounter can be found in the full encounter summary report (not reduplicated in this progress note). I personally obtained the chief complaint(s) and history of present illness.  I confirmed and edited as necessary the review of systems, past medical/surgical history, family history, social history, and examination findings as documented by others; and I examined the patient myself. I personally reviewed the relevant tests, images, and reports as documented above. I formulated and edited as necessary the assessment and plan and discussed the findings and management plan with the patient and family. - Kisha Bosch MD        Operative Note - Eyelid Biopsy      Jun 13, 2017    Pre-operative Diagnosis: Lesion left eye Upper Eyelid    Post-operative Diagnosis: Same.    Procedure: Biopsy of of eyelid neoplasm.    Surgeon: Kisha Bosch MD    Anesthesia: Local infiltration with 2% Lidocaine and Epinephrine.    Complications: " None.    Estimated blood loss: <5 mL    Specimen: Eyelid neoplasm to pathology.    Procedure: The patient was brought to the minor procedure room and placed supine on the operating table.  The involved eyelid was infiltrated with local anesthetic.  The area was prepped and draped in the typical sterile fashion.  A tooth forceps was used to elevate the lesion and it was excised at its base with a Dhara scissors. Hemostasis was obtained with a high temperature cautery.  The excised diameter measured 3 mm.      Closure of wound: The wound was allowed to granulate in    Dressing: The wound was dressed with Bacitracin ophthalmic ointment.    Disposition: The patient left the minor procedure room in stable condition.    Kisha Bosch MD  Oculoplastic and Orbital Surgery   Department of Ophthalmology and Visual Neurosciences  [facility]

## 2017-06-13 NOTE — TELEPHONE ENCOUNTER
"Reason for call:  Patient reporting a symptom    Symptom or request: Pt's back pain continues - See other telephone encounters.  Pt was rambling about a eyelid biopsy she had done today and explains that she is a \"mess\" and she is in \"excruciating pain.\"      Duration (how long have symptoms been present): ongoing    Have you been treated for this before? Yes    Additional comments:     Phone Number patient can be reached at:  Home number on file [phone] (home)    Best Time:  any    Can we leave a detailed message on this number:  YES    Call taken on 6/13/2017 at 1:56 PM by Jena Quintero    "

## 2017-06-13 NOTE — MR AVS SNAPSHOT
After Visit Summary   6/13/2017    Doretha Fernandez    MRN: [id number]           Patient Information     Date Of Birth          1975        Visit Information        Provider Department      6/13/2017 8:00 AM Kisha Bosch MD [facility] Ophthalmology        Today's Diagnoses     Eyelid lesion    -  1       Follow-ups after your visit        Your next 10 appointments already scheduled     Jun 21, 2017  2:00 PM CDT   Return Visit with Bairon Miranda MD   [facility] ([facility])    [address], [address]  [facility] [address] [phone]              Future tests that were ordered for you today     Open Future Orders        Priority Expected Expires Ordered    Surgical pathology exam Routine  9/11/2017 6/13/2017            Who to contact     Please call your clinic at [phone] to:    Ask questions about your health    Make or cancel appointments    Discuss your medicines    Learn about your test results    Speak to your doctor   If you have compliments or concerns about an experience at your clinic, or if you wish to file a complaint, please contact [facility] Patient Relations at [phone] or email us at [email or web address]         Additional Information About Your Visit        MyChart Information     [employer] gives you secure access to your electronic health record. If you see a primary care provider, you can also send messages to your care team and make appointments. If you have questions, please call your primary care clinic.  If you do not have a primary care provider, please call [phone] and they will assist you.      [employer] is an electronic gateway that provides easy, online access to your medical records. With [employer], you can request a clinic appointment, read your test results, renew a prescription or communicate with your care team.     To access your existing  account, please contact your [facility] or call [phone] for assistance.        Care EveryWhere ID     This is your Care EveryWhere ID. This could be used by other organizations to access your [facility] medical records  [id number]         Blood Pressure from Last 3 Encounters:   06/05/17 114/71   05/17/17 116/76   05/05/17 116/72    Weight from Last 3 Encounters:   06/05/17 93 kg (205 lb)   05/17/17 95.1 kg (209 lb 11.2 oz)   05/05/17 96.3 kg (212 lb 4.8 oz)              We Performed the Following     External Photos OU (both eyes)     Eyelid Biopsy incl Margin          Today's Medication Changes          These changes are accurate as of: 6/13/17  9:15 AM.  If you have any questions, ask your nurse or doctor.               Start taking these medicines.        Dose/Directions    bacitracin ophthalmic ointment   Used for:  Eyelid lesion   Started by:  Kisha Bosch MD        Dose:  0.25 inch   Place 0.25 inches Into the left eye 2 times daily for 7 days   Quantity:  1 Tube   Refills:  0       lidocaine 2%-EPINEPHrine 1:200,000 2 %-1:060992 injection   Used for:  Eyelid lesion   Started by:  Kisha Bosch MD        Dose:  1 mL   Inject 1 mL into the skin once for 1 dose   Quantity:  1 mL   Refills:  0         These medicines have changed or have updated prescriptions.        Dose/Directions    gabapentin 300 MG capsule   Commonly known as:  NEURONTIN   This may have changed:    - how much to take  - how to take this  - when to take this  - additional instructions   Used for:  Acute non intractable tension-type headache        Take 1 tablet (300 mg) every night for 1-3 days, then 1 tablet twice daily for 1-3 days, then 1 tablet three times daily   Quantity:  90 capsule   Refills:  0       topiramate 25 MG tablet   Commonly known as:  TOPAMAX   This may have changed:    - how much to take  - how to take this  - when to take this  - additional instructions   Used for:   Intractable chronic migraine without aura and without status migrainosus        Take 1 tablet (25 mg) at bedtime for 1 week, then 2 tablets at hs for 1 week, then 3 tablets thereafter   Quantity:  90 tablet   Refills:  3            Where to get your medicines      These medications were sent to [place], [place] - [address]  [address], [facility] [address]    Hours:  TRANSPLANT PHONE NUMBER [phone] Phone:  [phone]     bacitracin ophthalmic ointment         Some of these will need a paper prescription and others can be bought over the counter.  Ask your nurse if you have questions.     You don't need a prescription for these medications     lidocaine 2%-EPINEPHrine 1:200,000 2 %-1:529843 injection                Primary Care Provider Office Phone # Fax #    Anna Naidu [phone]       [facility] [address] [address] [address]        Thank you!     Thank you for choosing [facility] OPHTHALMOLOGY  for your care. Our goal is always to provide you with excellent care. Hearing back from our patients is one way we can continue to improve our services. Please take a few minutes to complete the written survey that you may receive in the mail after your visit with us. Thank you!             Your Updated Medication List - Protect others around you: Learn how to safely use, store and throw away your medicines at www.disposemymeds.org.          This list is accurate as of: 6/13/17  9:15 AM.  Always use your most recent med list.                   Brand Name Dispense Instructions for use    aspirin 81 MG EC tablet      Take 81 mg by mouth daily       bacitracin ophthalmic ointment     1 Tube    Place 0.25 inches Into the left eye 2 times daily for 7 days       cyclobenzaprine 10 MG tablet    FLEXERIL    42 tablet    Take 1 tablet (10 mg) by mouth 3 times daily as needed for muscle spasms       FLUoxetine 20 MG  capsule    PROzac    270 capsule    Take 80 mg by mouth daily       fluticasone 50 MCG/ACT spray    FLONASE    1 g    Spray 2 sprays into both nostrils daily       gabapentin 300 MG capsule    NEURONTIN    90 capsule    Take 1 tablet (300 mg) every night for 1-3 days, then 1 tablet twice daily for 1-3 days, then 1 tablet three times daily       HYDROcodone-acetaminophen 5-325 MG per tablet    NORCO    12 tablet    Take 1-2 tablets by mouth every 4 hours as needed for moderate to severe pain       lidocaine 2%-EPINEPHrine 1:200,000 2 %-1:131386 injection     1 mL    Inject 1 mL into the skin once for 1 dose       LORazepam 1 MG tablet    ATIVAN    30 tablet    Take 1 tablet (1 mg) by mouth every 8 hours as needed for anxiety       methocarbamol 750 MG tablet    ROBAXIN    30 tablet    Take 1 tablet (750 mg) by mouth 3 times daily as needed for other (Back tightness)       topiramate 25 MG tablet    TOPAMAX    90 tablet    Take 1 tablet (25 mg) at bedtime for 1 week, then 2 tablets at hs for 1 week, then 3 tablets thereafter       TYLENOL PO      Take 1,000 mg by mouth every 6 hours as needed for mild pain or fever

## 2017-06-14 LAB — COPATH REPORT: NORMAL
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
## 2017-06-15 ENCOUNTER — OFFICE VISIT (OUTPATIENT)
Dept: FAMILY MEDICINE | Facility: CLINIC | Age: 42
End: 2017-06-15
Payer: COMMERCIAL

## 2017-06-15 ENCOUNTER — RADIANT APPOINTMENT (OUTPATIENT)
Dept: GENERAL RADIOLOGY | Facility: CLINIC | Age: 42
End: 2017-06-15
Attending: FAMILY MEDICINE
Payer: COMMERCIAL

## 2017-06-15 VITALS
SYSTOLIC BLOOD PRESSURE: 101 MMHG | HEART RATE: 79 BPM | HEIGHT: 63 IN | DIASTOLIC BLOOD PRESSURE: 71 MMHG | BODY MASS INDEX: 37.1 KG/M2 | WEIGHT: 209.4 LBS | TEMPERATURE: 98.6 F

## 2017-06-15 DIAGNOSIS — M54.50 ACUTE LEFT-SIDED LOW BACK PAIN WITHOUT SCIATICA: Primary | ICD-10-CM

## 2017-06-15 DIAGNOSIS — F32.0 MAJOR DEPRESSIVE DISORDER, SINGLE EPISODE, MILD (H): ICD-10-CM

## 2017-06-15 DIAGNOSIS — M54.50 ACUTE LEFT-SIDED LOW BACK PAIN WITHOUT SCIATICA: ICD-10-CM

## 2017-06-15 PROCEDURE — 72100 X-RAY EXAM L-S SPINE 2/3 VWS: CPT

## 2017-06-15 PROCEDURE — 99214 OFFICE O/P EST MOD 30 MIN: CPT | Performed by: FAMILY MEDICINE

## 2017-06-15 ASSESSMENT — PAIN SCALES - GENERAL: PAINLEVEL: EXTREME PAIN (8)

## 2017-06-15 NOTE — PATIENT INSTRUCTIONS
LGBTQ Services  - Directory for LGBTQ health related servies: http://mnlgbtqdirectory.org/  - [place] [employer] [facility] [employer]: http://www.[employer]/ - [place]: 730-946-1981  - Program in Human Sexuality - comprehensive sexual health care - https://www.sexualhealth.[facility]/ - [place]: 124-788-9296  - [employer] provides individual and family counseling for youth ages 13-25 who are seeking therapeutic support related to sexual orientation and/or gender identity and expression: https://reclaimcare.org/ - [place]: 160.305.7875       Thank you for choosing [facility].  You may be receiving a survey in the mail from Amina Irene regarding your visit today.  Please take a few minutes to complete and return the survey to let us know how we are doing.      Our Clinic hours are:  Mondays    7:20 am - 7 pm  Tues -  Fri  7:20 am - 5 pm    Clinic Phone: [phone]    The clinic lab opens at 7:30 am Mon - Fri and appointments are required.    [facility] [place]  Ph. [phone]  Monday-Thursday 8 am - 7pm  Tues/Wed/Fri 8 am - 5:30 pm

## 2017-06-15 NOTE — NURSING NOTE
"Chief Complaint   Patient presents with     Back Pain     follow up from Ed on 6/5/17.  No known injury.  still having low back pain       Initial /71 (BP Location: Right arm, Cuff Size: Adult Large)  Pulse 79  Temp 98.6  F (37  C) (Tympanic)  Ht 5' 3\" (1.6 m)  Wt 209 lb 6.4 oz (95 kg)  Breastfeeding? No  BMI 37.09 kg/m2 Estimated body mass index is 37.09 kg/(m^2) as calculated from the following:    Height as of this encounter: 5' 3\" (1.6 m).    Weight as of this encounter: 209 lb 6.4 oz (95 kg).  Medication Reconciliation: complete    "

## 2017-06-15 NOTE — MR AVS SNAPSHOT
After Visit Summary   6/15/2017    Doretha Fernandez    MRN: [id number]           Patient Information     Date Of Birth          1975        Visit Information        Provider Department      6/15/2017 7:40 AM Myron Harris MD [facility]        Today's Diagnoses     Acute left-sided low back pain without sciatica    -  1    Major depressive disorder, single episode, mild (H)          Care Instructions      LGBTQ Services  - Directory for LGBTQ health related servies: http://mnlgbtqdirectory.org/  - [place] Transgender Health Coalition: http://www.Warm Health/ - [place]: 648-101-4551  - Program in Human Sexuality - comprehensive sexual health care - https://www.sexualhealth.[facility]/ - [place]: 160-032-4587  - Reclaim! provides individual and family counseling for youth ages 13-25 who are seeking therapeutic support related to sexual orientation and/or gender identity and expression: https://reclaimcare.org/ - [place]: 721.790.1730       Thank you for choosing [facility].  You may be receiving a survey in the mail from [employer] [facility] regarding your visit today.  Please take a few minutes to complete and return the survey to let us know how we are doing.      Our Clinic hours are:  Mondays    7:20 am - 7 pm  Tues -  Fri  7:20 am - 5 pm    Clinic Phone: [phone]    The clinic lab opens at 7:30 am Mon - Fri and appointments are required.    [facility]  Ph. [phone]  Monday-Thursday 8 am - 7pm  Tues/Wed/Fri 8 am - 5:30 pm                 Follow-ups after your visit        Additional Services     PHYSICAL THERAPY REFERRAL       *This therapy referral will be filtered to a centralized scheduling office at [facility] and the patient will receive a call to schedule an appointment at a [facility] location most convenient for them. *     [facility] provides Physical Therapy evaluation and  "treatment and many specialty services across the [facility].  If requesting a specialty program, please choose from the list below.    If you have not heard from the scheduling office within 2 business days, please call [phone] for all locations, with the exception of [place], please call [phone].  Treatment: Evaluation & Treatment  Special Instructions/Modalities:   Special Programs: None    Please be aware that coverage of these services is subject to the terms and limitations of your health insurance plan.  Call member services at your health plan with any benefit or coverage questions.      **Note to Provider:  If you are referring outside of [place] for the therapy appointment, please list the name of the location in the \"special instructions\" above, print the referral and give to the patient to schedule the appointment.                  Your next 10 appointments already scheduled     Jun 21, 2017  2:00 PM CDT   Return Visit with Bairon Miranda MD   [facility] ([facility])    [address] [place] [address] [address]  [facility] [address] [phone]              Who to contact     If you have questions or need follow up information about today's clinic visit or your schedule please contact [facility] directly at [phone].  Normal or non-critical lab and imaging results will be communicated to you by MyChart, letter or phone within 4 business days after the clinic has received the results. If you do not hear from us within 7 days, please contact the clinic through MyChart or phone. If you have a critical or abnormal lab result, we will notify you by phone as soon as possible.  Submit refill requests through [employer] or call your pharmacy and they will forward the refill request to us. Please allow 3 business days for your refill to be completed.          Additional Information About Your Visit        MyChart " "Information     Eli gives you secure access to your electronic health record. If you see a primary care provider, you can also send messages to your care team and make appointments. If you have questions, please call your primary care clinic.  If you do not have a primary care provider, please call [phone] and they will assist you.        Care EveryWhere ID     This is your Care EveryWhere ID. This could be used by other organizations to access your [place] medical records  [id number]        Your Vitals Were     Pulse Temperature Height Breastfeeding? BMI (Body Mass Index)       79 98.6  F (37  C) (Tympanic) 5' 3\" (1.6 m) No 37.09 kg/m2        Blood Pressure from Last 3 Encounters:   06/15/17 101/71   06/05/17 114/71   05/17/17 116/76    Weight from Last 3 Encounters:   06/15/17 209 lb 6.4 oz (95 kg)   06/05/17 205 lb (93 kg)   05/17/17 209 lb 11.2 oz (95.1 kg)              We Performed the Following     PHYSICAL THERAPY REFERRAL          Today's Medication Changes          These changes are accurate as of: 6/15/17  9:15 AM.  If you have any questions, ask your nurse or doctor.               These medicines have changed or have updated prescriptions.        Dose/Directions    FLUoxetine 20 MG capsule   Commonly known as:  PROzac   This may have changed:  how much to take   Used for:  Major depressive disorder, single episode, mild (H)   Changed by:  Myron Harris MD        Dose:  60 mg   Take 3 capsules (60 mg) by mouth daily   Quantity:  270 capsule   Refills:  01       gabapentin 300 MG capsule   Commonly known as:  NEURONTIN   This may have changed:    - how much to take  - how to take this  - when to take this  - additional instructions   Used for:  Acute non intractable tension-type headache        Take 1 tablet (300 mg) every night for 1-3 days, then 1 tablet twice daily for 1-3 days, then 1 tablet three times daily   Quantity:  90 capsule   Refills:  0       topiramate 25 MG tablet "   Commonly known as:  TOPAMAX   This may have changed:    - how much to take  - how to take this  - when to take this  - additional instructions   Used for:  Intractable chronic migraine without aura and without status migrainosus        Take 1 tablet (25 mg) at bedtime for 1 week, then 2 tablets at hs for 1 week, then 3 tablets thereafter   Quantity:  90 tablet   Refills:  3                Primary Care Provider Office Phone # Fax #    Anna Naidu [phone]       [facility] [address] [facility] [address]        Thank you!     Thank you for choosing [facility]  for your care. Our goal is always to provide you with excellent care. Hearing back from our patients is one way we can continue to improve our services. Please take a few minutes to complete the written survey that you may receive in the mail after your visit with us. Thank you!             Your Updated Medication List - Protect others around you: Learn how to safely use, store and throw away your medicines at www.disposemymeds.org.          This list is accurate as of: 6/15/17  9:15 AM.  Always use your most recent med list.                   Brand Name Dispense Instructions for use    aspirin 81 MG EC tablet      Take 81 mg by mouth daily       bacitracin ophthalmic ointment     1 Tube    Place 0.25 inches Into the left eye 2 times daily for 7 days       cyclobenzaprine 10 MG tablet    FLEXERIL    42 tablet    Take 1 tablet (10 mg) by mouth 3 times daily as needed for muscle spasms       FLUoxetine 20 MG capsule    PROzac    270 capsule    Take 3 capsules (60 mg) by mouth daily       fluticasone 50 MCG/ACT spray    FLONASE    1 g    Spray 2 sprays into both nostrils daily       gabapentin 300 MG capsule    NEURONTIN    90 capsule    Take 1 tablet (300 mg) every night for 1-3 days, then 1 tablet twice daily for 1-3 days, then 1 tablet three times daily       HYDROcodone-acetaminophen 5-325 MG per  tablet    NORCO    12 tablet    Take 1-2 tablets by mouth every 4 hours as needed for moderate to severe pain       LORazepam 1 MG tablet    ATIVAN    30 tablet    Take 1 tablet (1 mg) by mouth every 8 hours as needed for anxiety       methocarbamol 750 MG tablet    ROBAXIN    30 tablet    Take 1 tablet (750 mg) by mouth 3 times daily as needed for other (Back tightness)       topiramate 25 MG tablet    TOPAMAX    90 tablet    Take 1 tablet (25 mg) at bedtime for 1 week, then 2 tablets at hs for 1 week, then 3 tablets thereafter       TYLENOL PO      Take 1,000 mg by mouth every 6 hours as needed for mild pain or fever

## 2017-06-15 NOTE — PROGRESS NOTES
SUBJECTIVE:                                                    Doretha Fernandez is a 42 year old female who presents to clinic today for the following health issues:      ED/UC Followup:    Facility:  [facility] ED  Date of visit: 6/5/17  Reason for visit: low back pain, no known injury   Current Status: still having low back pain.     Problem: LBP  Location: low back  Quality: Sharp  Severity: moderate-severe  Duration: 2-3 weeks  Timing: continuous  Context: no injury that she recalls.  Modifying factors:  Associated signs and symptoms: no numbness weakness or radiating symptoms.     ADDITIONAL HPI: 42 year old female here for above issue.  Is very tearful about her symptoms.Has a history of depression. Was on 60mg of fluoxetine but she is only taking 20-40mg daily (she can't recall if she's taking 1 or 2 20mg caps but definitely not 3)    ROS: 10 point review of systems negative except as per HPI.    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      Lumbago     left lower back pain     Prothrombin deficiency (H)     takes 81mg asa daily     Stroke (cerebrum) (H) 2000    during labor, difficult      TIA (transient ischemic attack) 2004        ACTIVE MEDICAL PROBLEMS:  Patient Active Problem List   Diagnosis     CARDIOVASCULAR SCREENING; LDL GOAL LESS THAN 160     DUB (dysfunctional uterine bleeding)     Anxiety state     Esophageal reflux     Mild major depression (H)     Mild intermittent asthma without complication     Vision changes     Acute non intractable tension-type headache     Prothrombin mutation (H)        FAMILY HISTORY:  Family History   Problem Relation Age of Onset     CANCER Mother 45     lung     Neurologic Disorder Mother      Lipids Father      GASTROINTESTINAL DISEASE Father      Depression Father      CANCER Maternal Grandmother      Blood Disease Maternal Grandmother      Arthritis Maternal  Grandmother      DIABETES Maternal Grandmother      Depression Maternal Grandmother      Macular Degeneration Maternal Grandmother      Glaucoma Maternal Grandmother      DIABETES Maternal Grandfather      CEREBROVASCULAR DISEASE Maternal Grandfather      Blood Disease Maternal Grandfather      HEART DISEASE Maternal Grandfather      Glaucoma Maternal Grandfather      CANCER Paternal Grandmother      Cancer - colorectal Paternal Grandmother      Respiratory Paternal Grandfather      Blood Disease Paternal Grandfather      HEART DISEASE Daughter      Asthma Daughter      Depression Sister        SOCIAL HISTORY:  Social History     Social History     Marital status:      Spouse name: N/A     Number of children: N/A     Years of education: N/A     Occupational History     Not on file.     Social History Main Topics     Smoking status: Passive Smoke Exposure - Never Smoker     Last attempt to quit: 3/20/1998     Smokeless tobacco: Never Used     Alcohol use Yes      Comment: occ     Drug use: No     Sexual activity: Yes     Partners: Male     Birth control/ protection: Surgical     Other Topics Concern     Parent/Sibling W/ Cabg, Mi Or Angioplasty Before 65f 55m? No     Social History Narrative    19 y.o- patient's mother   of lung cancer. She had to take care of her younger sister.    2012- patient's  had a heart attack with stents placed, followed by cardiac rehabilitation    2000 TO 2012  was in [facility] for depression    2013 patient's  went through alcohol rehabilitation at [place] inpatient            They have attended couple counseling a couple of times and patient went to the family program for chemical dependency.    Patient denies alcohol or drug use and herself            Has 2 children, girls ages 5 and 8    [employer] [employer] and also works for the [employer]. 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                MEDICATIONS:  Current Outpatient Prescriptions   Medication Sig Dispense Refill     bacitracin ophthalmic ointment Place 0.25 inches Into the left eye 2 times daily for 7 days 1 Tube 0     HYDROcodone-acetaminophen (NORCO) 5-325 MG per tablet Take 1-2 tablets by mouth every 4 hours as needed for moderate to severe pain 12 tablet 0     methocarbamol (ROBAXIN) 750 MG tablet Take 1 tablet (750 mg) by mouth 3 times daily as needed for other (Back tightness) 30 tablet 0     topiramate (TOPAMAX) 25 MG tablet Take 1 tablet (25 mg) at bedtime for 1 week, then 2 tablets at hs for 1 week, then 3 tablets thereafter (Patient taking differently: Take 50 mg by mouth At Bedtime Take 1 tablet (25 mg) at bedtime for 1 week, then 2 tablets at hs for 1 week, then 3 tablets thereafter) 90 tablet 3     aspirin 81 MG EC tablet Take 81 mg by mouth daily        gabapentin (NEURONTIN) 300 MG capsule Take 1 tablet (300 mg) every night for 1-3 days, then 1 tablet twice daily for 1-3 days, then 1 tablet three times daily (Patient taking differently: Take 300 mg by mouth 3 times daily Take 1 tablet (300 mg) every night for 1-3 days, then 1 tablet twice daily for 1-3 days, then 1 tablet three times daily) 90 capsule 0     cyclobenzaprine (FLEXERIL) 10 MG tablet Take 1 tablet (10 mg) by mouth 3 times daily as needed for muscle spasms 42 tablet 0     FLUoxetine (PROZAC) 20 MG capsule Take 80 mg by mouth daily  270 capsule 01     LORazepam (ATIVAN) 1 MG tablet Take 1 tablet (1 mg) by mouth every 8 hours as needed for anxiety 30 tablet 0     Acetaminophen (TYLENOL PO) Take 1,000 mg by mouth every 6 hours as needed for mild pain or fever       fluticasone (FLONASE) 50 MCG/ACT nasal spray Spray 2 sprays into both nostrils daily 1 g 3       ALLERGIES:     Allergies   Allergen Reactions     Erythrocin Nausea and Vomiting     Zithromax [Azithromycin Dihydrate] Diarrhea       Problem list, Medication list, Allergies, and  "Medical/Social/Surgical histories reviewed in [facility] and updated as appropriate.    OBJECTIVE:                                                    VITALS: /71 (BP Location: Right arm, Cuff Size: Adult Large)  Pulse 79  Temp 98.6  F (37  C) (Tympanic)  Ht 5' 3\" (1.6 m)  Wt 209 lb 6.4 oz (95 kg)  Breastfeeding? No  BMI 37.09 kg/m2 Body mass index is 37.09 kg/(m^2).  GENERAL: Pleasant, well appearing female.  MUSCULOSKELETAL:  No midline vertebral tenderness to palpation. Has bilateral paravertebral tenderness and tightness. Straight leg raise is negative for radicular symptoms. Strength is 5/5 and DTR 2+ and symmetric throughout lower extremities.   PSYCH: Alert and oriented x3, neatly dressed and well groomed, makes good eye contact, fluid speech - non-pressured, no psychomotor agitation or slowing, good memory, judgement and insight, no auditory or visual hallucinations, tearful affect which is mood congruent.     Lumbar spine x-ray obtained. Read and interpreted by me.  No obvious bony deformities or degenerative changes.     ASSESSMENT/PLAN:                                                    1. Acute left-sided low back pain without sciatica  Advised ice/heat/ NSAIDs. Trial of physical therapy. Follow-up if not improving or if worsening.   - XR Lumbar Spine 2/3 Views; Future  - PHYSICAL THERAPY REFERRAL    2. Major depressive disorder, single episode, mild (H)  Suboptimally controlled. Increase fluoxetine back to 60mg daily.  - FLUoxetine (PROZAC) 20 MG capsule; Take 3 capsules (60 mg) by mouth daily  Dispense: 270 capsule; Refill: 01      "

## 2017-06-21 ENCOUNTER — OFFICE VISIT (OUTPATIENT)
Dept: NEUROLOGY | Facility: CLINIC | Age: 42
End: 2017-06-21

## 2017-06-21 DIAGNOSIS — G43.719 INTRACTABLE CHRONIC MIGRAINE WITHOUT AURA AND WITHOUT STATUS MIGRAINOSUS: ICD-10-CM

## 2017-06-21 RX ORDER — TOPIRAMATE 25 MG/1
TABLET, FILM COATED ORAL
Qty: 180 TABLET | Refills: 3 | Status: SHIPPED | OUTPATIENT
Start: 2017-06-21 | End: 2017-10-12

## 2017-06-21 NOTE — MR AVS SNAPSHOT
After Visit Summary   6/21/2017    Doretha Fernandez    MRN: [id number]           Patient Information     Date Of Birth          1975        Visit Information        Provider Department      6/21/2017 2:00 PM Bairon Miranda MD [facility]        Today's Diagnoses     Intractable chronic migraine without aura and without status migrainosus           Follow-ups after your visit        Follow-up notes from your care team     Return in about 6 weeks (around 8/1/2017).      Your next 10 appointments already scheduled     Jul 25, 2017  8:30 AM CDT   (Arrive by 8:15 AM)   New Mohs with Halley Granados MD   [facility] Dermatologic [facility] ([facility])    [address] [place] [address]  3rd Floor  [facility] [id number] [phone]            Jul 25, 2017   Procedure with Kisha Bosch MD   [facility] ([facility])    [address] [place] [address]  5th [place] [id number] [phone]           Located in the Clinics and Surgery Center at [address] [place] [address].   parking is very convenient and highly recommended.  is a $6 flat rate fee.  Both  and self parkers should enter the main arrival plaza from [facility]; parking attendants will direct you based on your parking preference.            Aug 02, 2017 10:00 AM CDT   Return Visit with Bairon Miranda MD   [facility] ([facility])    [address] [facility], [address]  [facility] [address] [phone]              Who to contact     Please call your clinic at [phone] to:    Ask questions about your health    Make or cancel appointments    Discuss your medicines    Learn about your test results    Speak to your doctor   If you have compliments or concerns about an experience at your clinic, or if you wish to file a  complaint, please contact [facility] Patient Relations at [phone] or email us at [email or web address]         Additional Information About Your Visit        SubHubhart Information     [employer] gives you secure access to your electronic health record. If you see a primary care provider, you can also send messages to your care team and make appointments. If you have questions, please call your primary care clinic.  If you do not have a primary care provider, please call [phone] and they will assist you.      ufindads is an electronic gateway that provides easy, online access to your medical records. With ufindads, you can request a clinic appointment, read your test results, renew a prescription or communicate with your care team.     To access your existing account, please contact your [facility] Clinic or call [phone] for assistance.        Care EveryWhere ID     This is your Care EveryWhere ID. This could be used by other organizations to access your [place] medical records  [id number]         Blood Pressure from Last 3 Encounters:   06/15/17 101/71   06/05/17 114/71   05/17/17 116/76    Weight from Last 3 Encounters:   06/15/17 209 lb 6.4 oz (95 kg)   06/05/17 205 lb (93 kg)   05/17/17 209 lb 11.2 oz (95.1 kg)              Today, you had the following     No orders found for display         Today's Medication Changes          These changes are accurate as of: 6/21/17  3:07 PM.  If you have any questions, ask your nurse or doctor.               These medicines have changed or have updated prescriptions.        Dose/Directions    gabapentin 300 MG capsule   Commonly known as:  NEURONTIN   This may have changed:    - how much to take  - how to take this  - when to take this  - additional instructions   Used for:  Acute non intractable tension-type headache        Take 1 tablet (300 mg) every night for 1-3 days, then 1 tablet twice daily for 1-3  days, then 1 tablet three times daily   Quantity:  90 capsule   Refills:  0       topiramate 25 MG tablet   Commonly known as:  TOPAMAX   This may have changed:  additional instructions   Used for:  Intractable chronic migraine without aura and without status migrainosus        Take 100mg at bedtime for 2 weeks, then 125mg  At hs   Quantity:  180 tablet   Refills:  3            Where to get your medicines      These medications were sent to [facility] [address]  [address] [facility] [address]     Phone:  [phone]     topiramate 25 MG tablet                Primary Care Provider Office Phone # Fax #    Anna Naidu [phone]       [facility] [address]  [facility] [address]        Equal Access to Services     BRIGETTE QUARLES : Hadii josy garnett hadasho Soalphonso, waaxda luqadaha, qaybta kaalmada adeegyada, pancho moore . So [facility] [phone].    ATENCIÓN: Si habla español, tiene a mcdonnell disposición servicios gratuitos de asistencia lingüística. [facility] [phone].    We comply with applicable federal civil rights laws and [place] laws. We do not discriminate on the basis of race, color, national origin, age, disability sex, sexual orientation or gender identity.            Thank you!     Thank you for choosing [facility]  for your care. Our goal is always to provide you with excellent care. Hearing back from our patients is one way we can continue to improve our services. Please take a few minutes to complete the written survey that you may receive in the mail after your visit with us. Thank you!             Your Updated Medication List - Protect others around you: Learn how to safely use, store and throw away your medicines at www.disposemymeds.org.          This list is accurate as of: 6/21/17  3:07 PM.  Always use your most recent med  list.                   Brand Name Dispense Instructions for use Diagnosis    aspirin 81 MG EC tablet      Take 81 mg by mouth daily        cyclobenzaprine 10 MG tablet    FLEXERIL    42 tablet    Take 1 tablet (10 mg) by mouth 3 times daily as needed for muscle spasms    Intractable tension-type headache, unspecified chronicity pattern       FLUoxetine 20 MG capsule    PROzac    270 capsule    Take 3 capsules (60 mg) by mouth daily    Major depressive disorder, single episode, mild (H)       fluticasone 50 MCG/ACT spray    FLONASE    1 g    Spray 2 sprays into both nostrils daily    Seasonal allergic rhinitis       gabapentin 300 MG capsule    NEURONTIN    90 capsule    Take 1 tablet (300 mg) every night for 1-3 days, then 1 tablet twice daily for 1-3 days, then 1 tablet three times daily    Acute non intractable tension-type headache       HYDROcodone-acetaminophen 5-325 MG per tablet    NORCO    12 tablet    Take 1-2 tablets by mouth every 4 hours as needed for moderate to severe pain        LORazepam 1 MG tablet    ATIVAN    30 tablet    Take 1 tablet (1 mg) by mouth every 8 hours as needed for anxiety    Anxiety, Acute intractable tension-type headache       methocarbamol 750 MG tablet    ROBAXIN    30 tablet    Take 1 tablet (750 mg) by mouth 3 times daily as needed for other (Back tightness)        topiramate 25 MG tablet    TOPAMAX    180 tablet    Take 100mg at bedtime for 2 weeks, then 125mg  At hs    Intractable chronic migraine without aura and without status migrainosus       TYLENOL PO      Take 1,000 mg by mouth every 6 hours as needed for mild pain or fever

## 2017-06-21 NOTE — LETTER
2017       RE: Doretha Fernandez  [address] [place] [address]     Dear Colleague,    Thank you for referring your patient, Doretha Fernandez, to the [facility] NEUROLOGY CLINIC at [facility] Please see a copy of my visit note below.    2017      Anna Naidu MD   [facility]    [address] [place] [address]      RE: Doretha Fernandez   MRN: [id number]   : 1975      Dear Dr. Naidu:      This is in regard to followup on Doretha Fernandez.  The patient returned today with chief complaint of visual blurring, left facial paresthesias, chronic headache, as well as acute low back pain.      The patient underwent further testing at my suggestion with our neuro-ophthalmologist.  I went over the actual study with her and did reassure her about the findings.  She said she still has decreased visual acuity involving her left eye.  I did ask her to review these subjective complaints with Dr. Norton, the neuro-ophthalmologist.  I hopefully was able to reassure her though about the extensive testing he had recommended and the results obtained.  She did note that she has cut back caffeinated coffee now to 1 cup per day.  She feels this is a miracle.  She did go ahead with this recommendation.  She said her headache is really no different.  She has now increased her dose of topiramate to 75 mg over the last 3 weeks.  She does not really notice any weight loss.  She has had a mild metallic taste in her mouth.  She denied had increasing depression.  She has chronic anxiety and stress.  She has not had any word finding difficulties, nor any paresthesias.  She is drinking 6-8 glasses of fluid a day.  She is going to increase her dose of topiramate now up to 125 mg in the next month to see if this is more effective in treating her chronic headache.  She is going to remain on the same dose of  gabapentin and I did talk with her about taking it mainly in the evening to avoid daytime issues.  She has had no trouble driving.  The patient denied any increasing depression.  Her other complaint was that of acute back pain.  This did seem to come after mowing the grass.  She said that she went to the emergency room on 06/05/2017 and I reviewed that consultation.  She was given Toradol.  She also was given a small amount of Vicodin.  She did start Robaxin.  She did not feel that it helped.  She was given Flexeril but has been taken off of it now.  She said overall, though she has improved.  She notes pain similar in 2004 that did come with the birth of her child and she did relate this also to her prior stroke symptoms.  I did not think there was really any connection.  She said that the pain is acute to the left and goes into the left posterior hip and then down the side of the proximal thigh.  She does not notice weakness nor paresthesias.  She is able to control her urine and stool.  The patient is scheduled to start physical therapy now.  Dr. Norton did have her evaluated for an eye lesion and she is scheduled now for a Mohs treatment for skin carcinoma on 07/25.  The patient has no other relevant history today.      Neurologic examination showed that the patient does have some mild increased paraspinal muscle tone in the lower back.  She has reduced extension of the lower back.  Otherwise, muscle stretch reflexes in the legs are normal.  Her toes are downward going to plantar stimulation.  Strength testing was unremarkable.      IMPRESSION:   1.  Chronic daily headache with chronic migrainous features.   2.  Visual blurring, which could be an atypical aura.   3.  Acute low back pain which could represent disk disease.   4.  Recent finding of skin cancer.      The patient has a complicated history.  I did talk with her now about starting physical therapy and increasing her dose of topiramate.  She is to  have followup here in 6-8 weeks and on a p.r.n. basis.  If she is not better, she may be a candidate for zonisamide.  I also talked with her about Botox.  She is going to continue to try to adjust her lifestyle and avoid day-to-day activities that could increase chronic migraine.      Thank you again for allowing me to see this patient.      Sincerely yours,      Cristina Miranda MD      I spent 30 minutes with her today.  Over 50% of the time this involved counseling and coordination of care.         CRISTINA MIRANDA MD             D: 2017 05:39   T: 2017 15:29   MT: AKA      Name:     ELIZABETH MOREIRA   MRN:      [id number]        Account:      [id number]   :      1975           Service Date: 2017      Document: [id number]

## 2017-06-22 NOTE — PROGRESS NOTES
2017      Anna Naidu MD   [facility]    [address] [place] [address]      RE: Doretha Fernandez   MRN: [id number]   : 1975      Dear Dr. Naidu:      This is in regard to followup on Doretha Fernandez.  The patient returned today with chief complaint of visual blurring, left facial paresthesias, chronic headache, as well as acute low back pain.      The patient underwent further testing at my suggestion with our neuro-ophthalmologist.  I went over the actual study with her and did reassure her about the findings.  She said she still has decreased visual acuity involving her left eye.  I did ask her to review these subjective complaints with Dr. Norton, the neuro-ophthalmologist.  I hopefully was able to reassure her though about the extensive testing he had recommended and the results obtained.  She did note that she has cut back caffeinated coffee now to 1 cup per day.  She feels this is a miracle.  She did go ahead with this recommendation.  She said her headache is really no different.  She has now increased her dose of topiramate to 75 mg over the last 3 weeks.  She does not really notice any weight loss.  She has had a mild metallic taste in her mouth.  She denied had increasing depression.  She has chronic anxiety and stress.  She has not had any word finding difficulties, nor any paresthesias.  She is drinking 6-8 glasses of fluid a day.  She is going to increase her dose of topiramate now up to 125 mg in the next month to see if this is more effective in treating her chronic headache.  She is going to remain on the same dose of gabapentin and I did talk with her about taking it mainly in the evening to avoid daytime issues.  She has had no trouble driving.  The patient denied any increasing depression.  Her other complaint was that of acute back pain.  This did seem to come after mowing the grass.  She said that she went to the emergency room on  06/05/2017 and I reviewed that consultation.  She was given Toradol.  She also was given a small amount of Vicodin.  She did start Robaxin.  She did not feel that it helped.  She was given Flexeril but has been taken off of it now.  She said overall, though she has improved.  She notes pain similar in 2004 that did come with the birth of her child and she did relate this also to her prior stroke symptoms.  I did not think there was really any connection.  She said that the pain is acute to the left and goes into the left posterior hip and then down the side of the proximal thigh.  She does not notice weakness nor paresthesias.  She is able to control her urine and stool.  The patient is scheduled to start physical therapy now.  Dr. Norton did have her evaluated for an eye lesion and she is scheduled now for a Mohs treatment for skin carcinoma on 07/25.  The patient has no other relevant history today.      Neurologic examination showed that the patient does have some mild increased paraspinal muscle tone in the lower back.  She has reduced extension of the lower back.  Otherwise, muscle stretch reflexes in the legs are normal.  Her toes are downward going to plantar stimulation.  Strength testing was unremarkable.      IMPRESSION:   1.  Chronic daily headache with chronic migrainous features.   2.  Visual blurring, which could be an atypical aura.   3.  Acute low back pain which could represent disk disease.   4.  Recent finding of skin cancer.      The patient has a complicated history.  I did talk with her now about starting physical therapy and increasing her dose of topiramate.  She is to have followup here in 6-8 weeks and on a p.r.n. basis.  If she is not better, she may be a candidate for zonisamide.  I also talked with her about Botox.  She is going to continue to try to adjust her lifestyle and avoid day-to-day activities that could increase chronic migraine.      Thank you again for allowing me to see  this patient.      Sincerely yours,      Cristina Miranda MD      I spent 30 minutes with her today.  Over 50% of the time this involved counseling and coordination of care.         CRISTINA MIRANDA MD             D: 2017 05:39   T: 2017 15:29   MT: AKA      Name:     ELIZABETH MOREIRA   MRN:      [id number]        Account:      [id number]   :      1975           Service Date: 2017      Document: [id number]

## 2017-06-26 ENCOUNTER — ALLIED HEALTH/NURSE VISIT (OUTPATIENT)
Dept: FAMILY MEDICINE | Facility: CLINIC | Age: 42
End: 2017-06-26
Payer: COMMERCIAL

## 2017-06-26 DIAGNOSIS — Z01.818 PREOP EXAMINATION: Primary | ICD-10-CM

## 2017-06-26 DIAGNOSIS — G44.209 ACUTE NON INTRACTABLE TENSION-TYPE HEADACHE: ICD-10-CM

## 2017-06-26 PROCEDURE — 99207 ZZC NO CHARGE NURSE ONLY: CPT

## 2017-06-26 RX ORDER — GABAPENTIN 300 MG/1
CAPSULE ORAL
Qty: 270 CAPSULE | Refills: 1 | Status: SHIPPED | OUTPATIENT
Start: 2017-06-26 | End: 2017-08-24

## 2017-06-26 NOTE — MR AVS SNAPSHOT
After Visit Summary   6/26/2017    Doretha Fernandez    MRN: [id number]           Patient Information     Date Of Birth          1975        Visit Information        Provider Department      6/26/2017 9:15 AM FL [facility]        Today's Diagnoses     Preop examination    -  1       Follow-ups after your visit        Your next 10 appointments already scheduled     Jul 17, 2017  3:20 PM CDT   [facility] MD   [facility]    [address]  [facility] [address]   [phone]            Jul 25, 2017  8:30 AM CDT   (Arrive by 8:15 AM)   New Mohs with Halley Granados MD   [facility] ([facility])    [address] [place] [address]  3rd Floor  [facility] [id number] [phone]            Jul 25, 2017   Procedure with Kisha Bosch MD   [facility] ([facility])    [address] [place] [address]  5th Floor  [facility] [id number] [phone]           Located in the [facility] at [address] [place] [address].   parking is very convenient and highly recommended.  is a $6 flat rate fee.  Both  and self parkers should enter the main arrival plaza from [facility]; parking attendants will direct you based on your parking preference.            Aug 02, 2017 10:00 AM CDT   Return Visit with Bairon Miranda MD   [facility] ([facility])    [address], [address]  [facility] [address] [phone]              Who to contact     If you have questions or need follow up information about today's clinic visit or your schedule please contact [facility] directly at [phone].  Normal or non-critical lab and imaging results will be communicated to you by MyChart, letter or phone within 4  business days after the clinic has received the results. If you do not hear from us within 7 days, please contact the clinic through [employer] or phone. If you have a critical or abnormal lab result, we will notify you by phone as soon as possible.  Submit refill requests through [employer] or call your pharmacy and they will forward the refill request to us. Please allow 3 business days for your refill to be completed.          Additional Information About Your Visit        [employer] Information     [employer] gives you secure access to your electronic health record. If you see a primary care provider, you can also send messages to your care team and make appointments. If you have questions, please call your primary care clinic.  If you do not have a primary care provider, please call [phone] and they will assist you.        Care EveryWhere ID     This is your Care EveryWhere ID. This could be used by other organizations to access your [place] medical records  [id number]         Blood Pressure from Last 3 Encounters:   06/15/17 101/71   06/05/17 114/71   05/17/17 116/76    Weight from Last 3 Encounters:   06/15/17 209 lb 6.4 oz (95 kg)   06/05/17 205 lb (93 kg)   05/17/17 209 lb 11.2 oz (95.1 kg)              Today, you had the following     No orders found for display         Today's Medication Changes          These changes are accurate as of: 6/26/17  1:39 PM.  If you have any questions, ask your nurse or doctor.               These medicines have changed or have updated prescriptions.        Dose/Directions    gabapentin 300 MG capsule   Commonly known as:  NEURONTIN   This may have changed:  additional instructions   Used for:  Acute non intractable tension-type headache   Changed by:  Anna Naidu MD        Take 3 tablets (900 mg) every night.   Quantity:  270 capsule   Refills:  1            Where to get your medicines      These medications were sent to [facility] [place], [place] -  [address] [facility]  [address] [facility], [facility] [address]     Phone:  [phone]     gabapentin 300 MG capsule                Primary Care Provider Office Phone # Fax #    Anna Naidu [phone]       [facility] [address]  [facility] [address]        Equal Access to Services     DISHA QUARLES : Hadii aad ku hadasho Soomaali, waaxda luqadaha, qaybta kaalmada adeegyada, waxay idiin hayaan adeeg kharash la'aan ah. So [facility] [phone].    ATENCIÓN: Si habla heidi, tiene a mcdonnell disposición servicios gratuitos de asistencia lingüística. Llame al [phone].    We comply with applicable federal civil rights laws and [place] laws. We do not discriminate on the basis of race, color, national origin, age, disability sex, sexual orientation or gender identity.            Thank you!     Thank you for choosing [facility]  for your care. Our goal is always to provide you with excellent care. Hearing back from our patients is one way we can continue to improve our services. Please take a few minutes to complete the written survey that you may receive in the mail after your visit with us. Thank you!             Your Updated Medication List - Protect others around you: Learn how to safely use, store and throw away your medicines at www.disposemymeds.org.          This list is accurate as of: 6/26/17  1:39 PM.  Always use your most recent med list.                   Brand Name Dispense Instructions for use Diagnosis    aspirin 81 MG EC tablet      Take 81 mg by mouth daily        cyclobenzaprine 10 MG tablet    FLEXERIL    42 tablet    Take 1 tablet (10 mg) by mouth 3 times daily as needed for muscle spasms    Intractable tension-type headache, unspecified chronicity pattern       FLUoxetine 20 MG capsule    PROzac    270 capsule    Take 3 capsules (60 mg) by mouth daily    Major depressive disorder, single episode, mild (H)       fluticasone 50 MCG/ACT  spray    FLONASE    1 g    Spray 2 sprays into both nostrils daily    Seasonal allergic rhinitis       gabapentin 300 MG capsule    NEURONTIN    270 capsule    Take 3 tablets (900 mg) every night.    Acute non intractable tension-type headache       HYDROcodone-acetaminophen 5-325 MG per tablet    NORCO    12 tablet    Take 1-2 tablets by mouth every 4 hours as needed for moderate to severe pain        LORazepam 1 MG tablet    ATIVAN    30 tablet    Take 1 tablet (1 mg) by mouth every 8 hours as needed for anxiety    Anxiety, Acute intractable tension-type headache       methocarbamol 750 MG tablet    ROBAXIN    30 tablet    Take 1 tablet (750 mg) by mouth 3 times daily as needed for other (Back tightness)        topiramate 25 MG tablet    TOPAMAX    180 tablet    Take 100mg at bedtime for 2 weeks, then 125mg  At hs    Intractable chronic migraine without aura and without status migrainosus       TYLENOL PO      Take 1,000 mg by mouth every 6 hours as needed for mild pain or fever

## 2017-06-26 NOTE — NURSING NOTE
Pt is currently on Intermittent leave for other condition.  Discussed new FMLA paper work she is needing for upcoming eye lid cancer surgery.  Preop scheduled for 7/17/17, surgery is on July 25th @ the [facility].  Pt will discuss completing FMLA form with Surgeon concerning estimated time needed off from work.  Pt will bring FMLA paperwork to preop appt if needed.  KpavelRN

## 2017-06-26 NOTE — TELEPHONE ENCOUNTER
Gabapentin refill.  Pt reports dose is Gabapentin 300 mg 3 tabs @ HS per Dr. Miranda.  Pt is out of meds and needing today.  Advise.

## 2017-07-11 ENCOUNTER — TELEPHONE (OUTPATIENT)
Dept: FAMILY MEDICINE | Facility: CLINIC | Age: 42
End: 2017-07-11

## 2017-07-11 NOTE — TELEPHONE ENCOUNTER
"Reason for call:  Patient reporting a symptom    Symptom or request: Pt states that her Prozac is not working.  Pt is weepy and stated, \"I am a mess.\"      Duration (how long have symptoms been present): ongoing    Have you been treated for this before? Yes    Additional comments:     Phone Number patient can be reached at:  Home number on file [phone] (home)    Best Time:  any    Can we leave a detailed message on this number:  YES    Call taken on 7/11/2017 at 8:39 AM by Jena Quintero    "

## 2017-07-11 NOTE — TELEPHONE ENCOUNTER
LM to [facility] clinic/RN.  Schedule appt with  to discuss meds and symptoms.KpavleRN

## 2017-07-12 ENCOUNTER — OFFICE VISIT (OUTPATIENT)
Dept: FAMILY MEDICINE | Facility: CLINIC | Age: 42
End: 2017-07-12
Payer: COMMERCIAL

## 2017-07-12 VITALS
SYSTOLIC BLOOD PRESSURE: 108 MMHG | WEIGHT: 209 LBS | HEIGHT: 63 IN | DIASTOLIC BLOOD PRESSURE: 75 MMHG | BODY MASS INDEX: 37.03 KG/M2 | HEART RATE: 81 BPM | TEMPERATURE: 97.2 F

## 2017-07-12 DIAGNOSIS — F32.0 MILD MAJOR DEPRESSION (H): Primary | ICD-10-CM

## 2017-07-12 PROCEDURE — 99213 OFFICE O/P EST LOW 20 MIN: CPT | Performed by: FAMILY MEDICINE

## 2017-07-12 RX ORDER — BUPROPION HYDROCHLORIDE 150 MG/1
150 TABLET ORAL EVERY MORNING
Qty: 30 TABLET | Refills: 1 | Status: SHIPPED | OUTPATIENT
Start: 2017-07-12 | End: 2017-10-12

## 2017-07-12 ASSESSMENT — PATIENT HEALTH QUESTIONNAIRE - PHQ9: 5. POOR APPETITE OR OVEREATING: NEARLY EVERY DAY

## 2017-07-12 ASSESSMENT — ANXIETY QUESTIONNAIRES
6. BECOMING EASILY ANNOYED OR IRRITABLE: NEARLY EVERY DAY
2. NOT BEING ABLE TO STOP OR CONTROL WORRYING: NEARLY EVERY DAY
1. FEELING NERVOUS, ANXIOUS, OR ON EDGE: NEARLY EVERY DAY
7. FEELING AFRAID AS IF SOMETHING AWFUL MIGHT HAPPEN: NEARLY EVERY DAY
3. WORRYING TOO MUCH ABOUT DIFFERENT THINGS: NEARLY EVERY DAY
5. BEING SO RESTLESS THAT IT IS HARD TO SIT STILL: NEARLY EVERY DAY
GAD7 TOTAL SCORE: 21

## 2017-07-12 ASSESSMENT — PAIN SCALES - GENERAL: PAINLEVEL: NO PAIN (0)

## 2017-07-12 NOTE — PROGRESS NOTES
SUBJECTIVE:                                                    Doretha Fernandez is a 42 year old female who presents to clinic today for the following health issues:    Depression and Anxiety Follow-Up    Status since last visit: Worsened     Other associated symptoms feels like emotion has been up ,  Has cancer (basal cell on her eyelid),   Having surgery in 2 weeks    Complicating factors:     Significant life event: No     Current substance abuse: None    PHQ-9 SCORE 9/16/2016 3/27/2017 7/12/2017   Total Score - - -   Total Score 12 2 20     SANDRA-7 SCORE 9/16/2015 1/7/2016 7/12/2017   Total Score - - -   Total Score 19 10 21       PHQ-9  English  PHQ-9   Any Language  GAD7    Amount of exercise or physical activity: None    Problems taking medications regularly: No    Medication side effects: none    Diet: regular (no restrictions)      ADDITIONAL HPI: 42 year old female here for above issue.      ROS: 10 point review of systems negative except as per HPI.    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      Lumbago     left lower back pain     Prothrombin deficiency (H)     takes 81mg asa daily     Stroke (cerebrum) (H) 2000    during labor, difficult      TIA (transient ischemic attack) 2004        ACTIVE MEDICAL PROBLEMS:  Patient Active Problem List   Diagnosis     CARDIOVASCULAR SCREENING; LDL GOAL LESS THAN 160     DUB (dysfunctional uterine bleeding)     Anxiety state     Esophageal reflux     Mild major depression (H)     Mild intermittent asthma without complication     Vision changes     Acute non intractable tension-type headache     Prothrombin mutation (H)        FAMILY HISTORY:  Family History   Problem Relation Age of Onset     CANCER Mother 45     lung     Neurologic Disorder Mother      Lipids Father      GASTROINTESTINAL DISEASE Father      Depression Father      CANCER Maternal Grandmother      Blood  Disease Maternal Grandmother      Arthritis Maternal Grandmother      DIABETES Maternal Grandmother      Depression Maternal Grandmother      Macular Degeneration Maternal Grandmother      Glaucoma Maternal Grandmother      DIABETES Maternal Grandfather      CEREBROVASCULAR DISEASE Maternal Grandfather      Blood Disease Maternal Grandfather      HEART DISEASE Maternal Grandfather      Glaucoma Maternal Grandfather      CANCER Paternal Grandmother      Cancer - colorectal Paternal Grandmother      Respiratory Paternal Grandfather      Blood Disease Paternal Grandfather      HEART DISEASE Daughter      Asthma Daughter      Depression Sister        SOCIAL HISTORY:  Social History     Social History     Marital status:      Spouse name: N/A     Number of children: N/A     Years of education: N/A     Occupational History     Not on file.     Social History Main Topics     Smoking status: Passive Smoke Exposure - Never Smoker     Last attempt to quit: 3/20/1998     Smokeless tobacco: Never Used     Alcohol use Yes      Comment: occ     Drug use: No     Sexual activity: Yes     Partners: Male     Birth control/ protection: Surgical     Other Topics Concern     Parent/Sibling W/ Cabg, Mi Or Angioplasty Before 65f 55m? No     Social History Narrative    19 y.o- patient's mother   of lung cancer. She had to take care of her younger sister.    2012- patient's  had a heart attack with stents placed, followed by cardiac rehabilitation    2000 TO 2012  was in [facility]    2013 patient's  went through alcohol rehabilitation at [facility] inpatient            They have attended couple counseling a couple of times and patient went to the family program for chemical dependency.    Patient denies alcohol or drug use and herself            Has 2 children, girls ages 5 and 8    [place] [employer] and also works for the [employer]. For a  "while she was working 3 jobs since her  was ill.               MEDICATIONS:  Current Outpatient Prescriptions   Medication Sig Dispense Refill     buPROPion (WELLBUTRIN XL) 150 MG 24 hr tablet Take 1 tablet (150 mg) by mouth every morning 30 tablet 1     gabapentin (NEURONTIN) 300 MG capsule Take 3 tablets (900 mg) every night. 270 capsule 1     topiramate (TOPAMAX) 25 MG tablet Take 100mg at bedtime for 2 weeks, then 125mg  At hs 180 tablet 3     FLUoxetine (PROZAC) 20 MG capsule Take 3 capsules (60 mg) by mouth daily 270 capsule 01     methocarbamol (ROBAXIN) 750 MG tablet Take 1 tablet (750 mg) by mouth 3 times daily as needed for other (Back tightness) 30 tablet 0     aspirin 81 MG EC tablet Take 81 mg by mouth daily        LORazepam (ATIVAN) 1 MG tablet Take 1 tablet (1 mg) by mouth every 8 hours as needed for anxiety 30 tablet 0     Acetaminophen (TYLENOL PO) Take 1,000 mg by mouth every 6 hours as needed for mild pain or fever       fluticasone (FLONASE) 50 MCG/ACT nasal spray Spray 2 sprays into both nostrils daily 1 g 3     HYDROcodone-acetaminophen (NORCO) 5-325 MG per tablet Take 1-2 tablets by mouth every 4 hours as needed for moderate to severe pain 12 tablet 0     cyclobenzaprine (FLEXERIL) 10 MG tablet Take 1 tablet (10 mg) by mouth 3 times daily as needed for muscle spasms 42 tablet 0       ALLERGIES:     Allergies   Allergen Reactions     Erythrocin Nausea and Vomiting     Zithromax [Azithromycin Dihydrate] Diarrhea       Problem list, Medication list, Allergies, and Medical/Social/Surgical histories reviewed in [facility] and updated as appropriate.    OBJECTIVE:                                                    VITALS: /75 (BP Location: Right arm, Cuff Size: Adult Large)  Pulse 81  Temp 97.2  F (36.2  C) (Oral)  Ht 5' 3\" (1.6 m)  Wt 209 lb (94.8 kg)  LMP 07/03/2017 (Approximate)  Breastfeeding? No  BMI 37.02 kg/m2 Body mass index is 37.02 kg/(m^2).  GENERAL: Pleasant, well appearing " female.  PSYCH: Alert and oriented x3, neatly dressed and well groomed, makes good eye contact, fluid speech - non-pressured, no psychomotor agitation or slowing, good memory, judgement and insight, no auditory or visual hallucinations, tearful affect which is mood congruent.     ASSESSMENT/PLAN:                                                    1. Mild major depression (H)  Suboptimally controlled. Continue with prozac - she checked at home as there was confusion on how much she was taking - she was actually taking 60mg daily.  Will therefore add wellbutrin. Discussed use and side effects of medication including increased risk of suicidal ideation when starting, adjusting dose or stopping medication. Call 911 or go to ER immediately if this occurs. Follow-up for re-check in 1 month. Recommended continuing with therapy.  - buPROPion (WELLBUTRIN XL) 150 MG 24 hr tablet; Take 1 tablet (150 mg) by mouth every morning  Dispense: 30 tablet; Refill: 1

## 2017-07-12 NOTE — MR AVS SNAPSHOT
After Visit Summary   7/12/2017    Doretha Fernandez    MRN: [id number]           Patient Information     Date Of Birth          1975        Visit Information        Provider Department      7/12/2017 2:40 PM Myron Harris MD [facility]        Today's Diagnoses     Mild major depression (H)    -  1      Care Instructions          Thank you for choosing [facility].  You may be receiving a survey in the mail from [facility] regarding your visit today.  Please take a few minutes to complete and return the survey to let us know how we are doing.      Our Clinic hours are:  Mondays    7:20 am - 7 pm  Tues -  Fri  7:20 am - 5 pm    Clinic Phone: [phone]    The clinic lab opens at 7:30 am Mon - Fri and appointments are required.    [facility]  Ph. [phone]  Monday-Thursday 8 am - 7pm  Tues/Wed/Fri 8 am - 5:30 pm                 Follow-ups after your visit        Your next 10 appointments already scheduled     Jul 17, 2017  3:20 PM CDT   [facility]idu MD   [facility]    [address] [facility] [address]   [phone]            Jul 25, 2017  8:30 AM CDT   (Arrive by 8:15 AM)   New Mohs with Halley Granados MD   [facility] Dermatologic [facility] ([facility])    [address] [place] [id number] [phone]            Jul 25, 2017   Procedure with Kisha Bosch MD   [facility] ([facility])    [address] [place] [id number] [phone]           Located in the Clinics and Surgery Center at [address] [place] [address].   parking is very convenient and highly recommended.  is a $6 flat rate fee.  Both  and self parkers should enter the main arrival plaza from [facility]; parking attendants will direct  "you based on your parking preference.            Aug 02, 2017 10:00 AM CDT   Return Visit with Bairon Miranda MD   [facility] ([facility])    [address], [address]  [facility] [address] [phone]              Who to contact     If you have questions or need follow up information about today's clinic visit or your schedule please contact [facility] directly at [phone].  Normal or non-critical lab and imaging results will be communicated to you by icomplyhart, letter or phone within 4 business days after the clinic has received the results. If you do not hear from us within 7 days, please contact the clinic through Siastot or phone. If you have a critical or abnormal lab result, we will notify you by phone as soon as possible.  Submit refill requests through Kiddy or call your pharmacy and they will forward the refill request to us. Please allow 3 business days for your refill to be completed.          Additional Information About Your Visit        icomplyharSmartsy Information     Kiddy gives you secure access to your electronic health record. If you see a primary care provider, you can also send messages to your care team and make appointments. If you have questions, please call your primary care clinic.  If you do not have a primary care provider, please call [phone] and they will assist you.        Care EveryWhere ID     This is your Care EveryWhere ID. This could be used by other organizations to access your [place] medical records  [id number]        Your Vitals Were     Pulse Temperature Height Last Period Breastfeeding? BMI (Body Mass Index)    81 97.2  F (36.2  C) (Oral) 5' 3\" (1.6 m) 07/03/2017 (Approximate) No 37.02 kg/m2       Blood Pressure from Last 3 Encounters:   07/12/17 108/75   06/15/17 101/71   06/05/17 114/71    Weight from Last 3 Encounters:   07/12/17 209 lb (94.8 kg)   06/15/17 209 lb 6.4 oz " (95 kg)   06/05/17 205 lb (93 kg)              Today, you had the following     No orders found for display         Today's Medication Changes          These changes are accurate as of: 7/12/17  4:10 PM.  If you have any questions, ask your nurse or doctor.               Start taking these medicines.        Dose/Directions    buPROPion 150 MG 24 hr tablet   Commonly known as:  WELLBUTRIN XL   Used for:  Mild major depression (H)   Started by:  Myron Harris MD        Dose:  150 mg   Take 1 tablet (150 mg) by mouth every morning   Quantity:  30 tablet   Refills:  1            Where to get your medicines      These medications were sent to [facility] [address]  [address] [facility] [address]     Phone:  [phone]     buPROPion 150 MG 24 hr tablet                Primary Care Provider Office Phone # Fax #    Anna Naidu [phone]       [facility] [address] [facility] [address]        Equal Access to Services     [facility]: Hadii josy ku hadasho Soomaali, waaxda luqadaha, qaybta kaalmada adeegyada, pancho moore . So [facility] [phone].    ATENCIÓN: Si habla español, tiene a mcdonnell disposición servicios gratuitos de asistencia lingüística. [facility] [phone].    We comply with applicable federal civil rights laws and [place] laws. We do not discriminate on the basis of race, color, national origin, age, disability sex, sexual orientation or gender identity.            Thank you!     Thank you for choosing [facility]  for your care. Our goal is always to provide you with excellent care. Hearing back from our patients is one way we can continue to improve our services. Please take a few minutes to complete the written survey that you may receive in the mail after your visit with us. Thank you!             Your Updated Medication List - Protect others  around you: Learn how to safely use, store and throw away your medicines at www.disposemymeds.org.          This list is accurate as of: 7/12/17  4:10 PM.  Always use your most recent med list.                   Brand Name Dispense Instructions for use Diagnosis    aspirin 81 MG EC tablet      Take 81 mg by mouth daily        buPROPion 150 MG 24 hr tablet    WELLBUTRIN XL    30 tablet    Take 1 tablet (150 mg) by mouth every morning    Mild major depression (H)       cyclobenzaprine 10 MG tablet    FLEXERIL    42 tablet    Take 1 tablet (10 mg) by mouth 3 times daily as needed for muscle spasms    Intractable tension-type headache, unspecified chronicity pattern       FLUoxetine 20 MG capsule    PROzac    270 capsule    Take 3 capsules (60 mg) by mouth daily    Major depressive disorder, single episode, mild (H)       fluticasone 50 MCG/ACT spray    FLONASE    1 g    Spray 2 sprays into both nostrils daily    Seasonal allergic rhinitis       gabapentin 300 MG capsule    NEURONTIN    270 capsule    Take 3 tablets (900 mg) every night.    Acute non intractable tension-type headache       HYDROcodone-acetaminophen 5-325 MG per tablet    NORCO    12 tablet    Take 1-2 tablets by mouth every 4 hours as needed for moderate to severe pain        LORazepam 1 MG tablet    ATIVAN    30 tablet    Take 1 tablet (1 mg) by mouth every 8 hours as needed for anxiety    Anxiety, Acute intractable tension-type headache       methocarbamol 750 MG tablet    ROBAXIN    30 tablet    Take 1 tablet (750 mg) by mouth 3 times daily as needed for other (Back tightness)        topiramate 25 MG tablet    TOPAMAX    180 tablet    Take 100mg at bedtime for 2 weeks, then 125mg  At hs    Intractable chronic migraine without aura and without status migrainosus       TYLENOL PO      Take 1,000 mg by mouth every 6 hours as needed for mild pain or fever

## 2017-07-12 NOTE — PATIENT INSTRUCTIONS
Thank you for choosing [facility].  You may be receiving a survey in the mail from [facility] regarding your visit today.  Please take a few minutes to complete and return the survey to let us know how we are doing.      Our Clinic hours are:  Mondays    7:20 am - 7 pm  Tues -  Fri  7:20 am - 5 pm    Clinic Phone: [phone]    The clinic lab opens at 7:30 am Mon - Fri and appointments are required.    [facility]. [phone]  Monday-Thursday 8 am - 7pm  Tues/Wed/Fri 8 am - 5:30 pm

## 2017-07-12 NOTE — NURSING NOTE
"Chief Complaint   Patient presents with     Depression     med recheck.  Prozac doesn't seem to be helping       Initial /75 (BP Location: Right arm, Cuff Size: Adult Large)  Pulse 81  Temp 97.2  F (36.2  C) (Oral)  Ht 5' 3\" (1.6 m)  Wt 209 lb (94.8 kg)  LMP 07/03/2017 (Approximate)  Breastfeeding? No  BMI 37.02 kg/m2 Estimated body mass index is 37.02 kg/(m^2) as calculated from the following:    Height as of this encounter: 5' 3\" (1.6 m).    Weight as of this encounter: 209 lb (94.8 kg).  Medication Reconciliation: complete    "

## 2017-07-13 ASSESSMENT — ANXIETY QUESTIONNAIRES: GAD7 TOTAL SCORE: 21

## 2017-07-13 ASSESSMENT — PATIENT HEALTH QUESTIONNAIRE - PHQ9: SUM OF ALL RESPONSES TO PHQ QUESTIONS 1-9: 20

## 2017-07-17 ENCOUNTER — OFFICE VISIT (OUTPATIENT)
Dept: FAMILY MEDICINE | Facility: CLINIC | Age: 42
End: 2017-07-17
Payer: COMMERCIAL

## 2017-07-17 VITALS
SYSTOLIC BLOOD PRESSURE: 109 MMHG | RESPIRATION RATE: 18 BRPM | WEIGHT: 209 LBS | DIASTOLIC BLOOD PRESSURE: 73 MMHG | HEART RATE: 85 BPM | HEIGHT: 63 IN | BODY MASS INDEX: 37.03 KG/M2

## 2017-07-17 DIAGNOSIS — C44.91 NODULAR BASAL CELL CARCINOMA: ICD-10-CM

## 2017-07-17 DIAGNOSIS — G44.209 ACUTE NON INTRACTABLE TENSION-TYPE HEADACHE: ICD-10-CM

## 2017-07-17 DIAGNOSIS — D68.52 PROTHROMBIN MUTATION (H): ICD-10-CM

## 2017-07-17 DIAGNOSIS — F32.0 MILD MAJOR DEPRESSION (H): ICD-10-CM

## 2017-07-17 DIAGNOSIS — Z01.818 PREOP GENERAL PHYSICAL EXAM: Primary | ICD-10-CM

## 2017-07-17 PROCEDURE — 99214 OFFICE O/P EST MOD 30 MIN: CPT | Performed by: FAMILY MEDICINE

## 2017-07-17 NOTE — MR AVS SNAPSHOT
After Visit Summary   7/17/2017    Doretha Fernandez    MRN: [id number]           Patient Information     Date Of Birth          1975        Visit Information        Provider Department      7/17/2017 3:20 PM Anna Naidu MD [facility]        Today's Diagnoses     Preop general physical exam    -  1    Nodular basal cell carcinoma        Mild major depression (H)        Acute non intractable tension-type headache        Prothrombin mutation (H)          Care Instructions          Thank you for choosing [facility].  You may be receiving a survey in the mail from [facility] regarding your visit today.  Please take a few minutes to complete and return the survey to let us know how we are doing.      Our Clinic hours are:  Mondays    7:20 am - 7 pm  Tues -  Fri  7:20 am - 5 pm    Clinic Phone: [phone]    The clinic lab opens at 7:30 am Mon - Fri and appointments are required.    [facility]  Ph. [phone]  Monday-Thursday 8 am - 7pm  Tues/Wed/Fri 8 am - 5:30 pm       Before Your Surgery      Call your surgeon if there is any change in your health. This includes signs of a cold or flu (such as a sore throat, runny nose, cough, rash or fever).    Do not smoke, drink alcohol or take over the counter medicine (unless your surgeon or primary care doctor tells you to) for the 24 hours before and after surgery.    If you take prescribed drugs: Follow your doctor s orders about which medicines to take and which to stop until after surgery.    Eating and drinking prior to surgery: follow the instructions from your surgeon    Take a shower or bath the night before surgery. Use the soap your surgeon gave you to gently clean your skin. If you do not have soap from your surgeon, use your regular soap. Do not shave or scrub the surgery site.  Wear clean pajamas and have clean sheets on your bed.           Follow-ups after your visit        Your next 10  appointments already scheduled     Jul 25, 2017  8:30 AM CDT   (Arrive by 8:15 AM)   New Mohs with Halley Granados MD   [facility] ([facility])    [address]  3rd [address]  [facility] [id number] [phone]            Jul 25, 2017   Procedure with Kisha Bosch MD   [facility] ([facility])    [address]  5th Floor  [facility] [id number] [phone]           Located in the Clinics and Surgery Center at [address] [place] [address].   parking is very convenient and highly recommended.  is a $6 flat rate fee.  Both  and self parkers should enter the main arrival plaza from [facility]; parking attendants will direct you based on your parking preference.            Aug 02, 2017 10:00 AM CDT   Return Visit with Bairon Miranda MD   [facility] ([facility])    [address]  [facility] [address] [phone]              Who to contact     If you have questions or need follow up information about today's clinic visit or your schedule please contact [facility] directly at [phone].  Normal or non-critical lab and imaging results will be communicated to you by [facility], letter or phone within 4 business days after the clinic has received the results. If you do not hear from us within 7 days, please contact the clinic through [facility] or phone. If you have a critical or abnormal lab result, we will notify you by phone as soon as possible.  Submit refill requests through [employer] or call your pharmacy and they will forward the refill request to us. Please allow 3 business days for your refill to be completed.          Additional Information About Your Visit        [employer] Information     [employer] gives you secure access to your electronic health record. If you  "see a primary care provider, you can also send messages to your care team and make appointments. If you have questions, please call your primary care clinic.  If you do not have a primary care provider, please call [phone] and they will assist you.        Care EveryWhere ID     This is your Care EveryWhere ID. This could be used by other organizations to access your [place] medical records  [id number]        Your Vitals Were     Pulse Respirations Height Last Period Breastfeeding? BMI (Body Mass Index)    85 18 5' 3\" (1.6 m) 07/03/2017 (Approximate) No 37.02 kg/m2       Blood Pressure from Last 3 Encounters:   07/17/17 109/73   07/12/17 108/75   06/15/17 101/71    Weight from Last 3 Encounters:   07/17/17 209 lb (94.8 kg)   07/12/17 209 lb (94.8 kg)   06/15/17 209 lb 6.4 oz (95 kg)              Today, you had the following     No orders found for display       Primary Care Provider Office Phone # Fax #    Anna Naidu [phone]       [facility] [address] [facility] [address]        Equal Access to Services     [facility]: Hadii aad ku hadasho Soomaali, waaxda luqadaha, qaybta kaalmada adeegyada, waxay anyain hayzeyadn liliam schulzarafernando la'aan ah. So [facility] [phone].    ATENCIÓN: Si habla español, tiene a mcdonnell disposición servicios gratuitos de asistencia lingüística. Llame al [phone].    We comply with applicable federal civil rights laws and [place] laws. We do not discriminate on the basis of race, color, national origin, age, disability sex, sexual orientation or gender identity.            Thank you!     Thank you for choosing [facility]note 2[facility]  for your care. Our goal is always to provide you with excellent care. Hearing back from our patients is one way we can continue to improve our services. Please take a few minutes to complete the written survey that you may receive in the mail after your visit with us. Thank you!             Your Updated " Medication List - Protect others around you: Learn how to safely use, store and throw away your medicines at www.disposemymeds.org.          This list is accurate as of: 7/17/17  4:15 PM.  Always use your most recent med list.                   Brand Name Dispense Instructions for use Diagnosis    aspirin 81 MG EC tablet      Take 81 mg by mouth daily        buPROPion 150 MG 24 hr tablet    WELLBUTRIN XL    30 tablet    Take 1 tablet (150 mg) by mouth every morning    Mild major depression (H)       cyclobenzaprine 10 MG tablet    FLEXERIL    42 tablet    Take 1 tablet (10 mg) by mouth 3 times daily as needed for muscle spasms    Intractable tension-type headache, unspecified chronicity pattern       FLUoxetine 20 MG capsule    PROzac    270 capsule    Take 3 capsules (60 mg) by mouth daily    Major depressive disorder, single episode, mild (H)       fluticasone 50 MCG/ACT spray    FLONASE    1 g    Spray 2 sprays into both nostrils daily    Seasonal allergic rhinitis       gabapentin 300 MG capsule    NEURONTIN    270 capsule    Take 3 tablets (900 mg) every night.    Acute non intractable tension-type headache       HYDROcodone-acetaminophen 5-325 MG per tablet    NORCO    12 tablet    Take 1-2 tablets by mouth every 4 hours as needed for moderate to severe pain        LORazepam 1 MG tablet    ATIVAN    30 tablet    Take 1 tablet (1 mg) by mouth every 8 hours as needed for anxiety    Anxiety, Acute intractable tension-type headache       methocarbamol 750 MG tablet    ROBAXIN    30 tablet    Take 1 tablet (750 mg) by mouth 3 times daily as needed for other (Back tightness)        topiramate 25 MG tablet    TOPAMAX    180 tablet    Take 100mg at bedtime for 2 weeks, then 125mg  At hs    Intractable chronic migraine without aura and without status migrainosus       TYLENOL PO      Take 1,000 mg by mouth every 6 hours as needed for mild pain or fever

## 2017-07-17 NOTE — PROGRESS NOTES
[facility]  [address]  [facility] [address]  [phone]  Dept: [phone]    PRE-OP EVALUATION:  Today's date: 2017    Doretha Fernandez (: 1975) presents for pre-operative evaluation assessment as requested by Dr. Bosch.  She requires evaluation and anesthesia risk assessment prior to undergoing surgery/procedure for treatment of  Left Upper Lid Moh's Reconstruction.  Proposed procedure: REPAIR MOHS    Date of Surgery/ Procedure: 17  Time of Surgery/ Procedure: 2:00 PM  Hospital/Surgical Facility: [facility]   Primary Physician: Anna Naidu  Type of Anesthesia Anticipated: Combined MAC with Local    Patient has a Health Care Directive or Living Will:  NO    1. NO - Do you have a history of heart attack, stroke, stent, bypass or surgery on an artery in the head, neck, heart or legs?  2. NO - Do you ever have any pain or discomfort in your chest?  3. NO - Do you have a history of  Heart Failure?  4. NO - Are you troubled by shortness of breath when: walking on the level, up a slight hill or at night?  5. NO - Do you currently have a cold, bronchitis or other respiratory infection?  6. NO - Do you have a cough, shortness of breath or wheezing?  7. NO - Do you sometimes get pains in the calves of your legs when you walk?  8. YES - Do you or anyone in your family have previous history of blood clots?  9. YES - Do you or does anyone in your family have a serious bleeding problem such as prolonged bleeding following surgeries or cuts? self  10. YES - Have you ever had problems with anemia or been told to take iron pills?  11. YES - Have you had any abnormal blood loss such as black, tarry or bloody stools, or abnormal vaginal bleeding?   12. NO - Have you ever had a blood transfusion?  13. NO - Have you or any of your relatives ever had problems with anesthesia?  14. NO - Do you have sleep apnea, excessive snoring or daytime drowsiness?  15. NO - Do you have any  prosthetic heart valves?  16. NO - Do you have prosthetic joints?  17. NO - Is there any chance that you may be pregnant?      HPI:                                                      Brief HPI related to upcoming procedure: found to have a nodular basal cell carcinoma of the left upper eyelid - growth had been noted over a year or so.      DEPRESSION - Patient has a long history of Depression of moderate severity requiring medication for control with recent symptoms being gradually worsening..Current symptoms of depression include depressed mood, hopelessness, difficulty with concentration.     Recently started on wellbutrin    MIGRAINES- improved on the daily topamax and gabapentin                                                                                                                                                                          .    MEDICAL HISTORY:                                                      Patient Active Problem List    Diagnosis Date Noted     Prothrombin mutation (H) 04/05/2017     Priority: Medium     On daily aspirin 81 mg per hematology's recommendations from 2008       Vision changes 04/01/2017     Priority: Medium     Acute non intractable tension-type headache 04/01/2017     Priority: Medium     Mild intermittent asthma without complication 11/08/2013     Priority: Medium     Mild major depression (H) 06/24/2013     Priority: Medium     Anxiety state 09/13/2012     Priority: Medium     Problem list name updated by automated process. Provider to review       Esophageal reflux 09/13/2012     Priority: Medium     DUB (dysfunctional uterine bleeding) 07/28/2011     Priority: Medium     CARDIOVASCULAR SCREENING; LDL GOAL LESS THAN 160 07/28/2011     Priority: Low      Past Medical History:   Diagnosis Date     Abnormal MRI     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      Lumbago     left lower back pain     Prothrombin deficiency (H)     takes 81mg asa daily      Stroke (cerebrum) (H) 2000    during labor, difficult      TIA (transient ischemic attack) 2004     History reviewed. No pertinent surgical history.  Current Outpatient Prescriptions   Medication Sig Dispense Refill     buPROPion (WELLBUTRIN XL) 150 MG 24 hr tablet Take 1 tablet (150 mg) by mouth every morning 30 tablet 1     gabapentin (NEURONTIN) 300 MG capsule Take 3 tablets (900 mg) every night. 270 capsule 1     topiramate (TOPAMAX) 25 MG tablet Take 100mg at bedtime for 2 weeks, then 125mg  At hs 180 tablet 3     FLUoxetine (PROZAC) 20 MG capsule Take 3 capsules (60 mg) by mouth daily 270 capsule 01     HYDROcodone-acetaminophen (NORCO) 5-325 MG per tablet Take 1-2 tablets by mouth every 4 hours as needed for moderate to severe pain 12 tablet 0     methocarbamol (ROBAXIN) 750 MG tablet Take 1 tablet (750 mg) by mouth 3 times daily as needed for other (Back tightness) 30 tablet 0     aspirin 81 MG EC tablet Take 81 mg by mouth daily        cyclobenzaprine (FLEXERIL) 10 MG tablet Take 1 tablet (10 mg) by mouth 3 times daily as needed for muscle spasms 42 tablet 0     LORazepam (ATIVAN) 1 MG tablet Take 1 tablet (1 mg) by mouth every 8 hours as needed for anxiety 30 tablet 0     Acetaminophen (TYLENOL PO) Take 1,000 mg by mouth every 6 hours as needed for mild pain or fever       fluticasone (FLONASE) 50 MCG/ACT nasal spray Spray 2 sprays into both nostrils daily 1 g 3     OTC products: None, except as noted above    Allergies   Allergen Reactions     Erythrocin Nausea and Vomiting     Zithromax [Azithromycin Dihydrate] Diarrhea      Latex Allergy: NO    Social History   Substance Use Topics     Smoking status: Passive Smoke Exposure - Never Smoker     Last attempt to quit: 3/20/1998     Smokeless tobacco: Never Used     Alcohol use Yes      Comment: occ     History   Drug Use No       REVIEW OF SYSTEMS:                                                    C: NEGATIVE for fever, chills, change in weight  E/M:  "NEGATIVE for ear, mouth and throat problems  R: NEGATIVE for significant cough or SOB  CV: NEGATIVE for chest pain, palpitations or peripheral edema    EXAM:                                                    /73  Pulse 85  Resp 18  Ht 5' 3\" (1.6 m)  Wt 209 lb (94.8 kg)  LMP 07/03/2017 (Approximate)  Breastfeeding? No  BMI 37.02 kg/m2  GENERAL APPEARANCE: healthy, alert and no distress  HENT: ear canals and TM's normal and nose and mouth without ulcers or lesions  RESP: lungs clear to auscultation - no rales, rhonchi or wheezes  CV: regular rate and rhythm, normal S1 S2, no S3 or S4 and no murmur, click or rub   ABDOMEN: soft, nontender, no HSM or masses and bowel sounds normal  NEURO: Normal strength and tone, sensory exam grossly normal, mentation intact and speech normal    DIAGNOSTICS:                                                    No labs or EKG required for low risk surgery (cataract, skin procedure, breast biopsy, etc)    Recent Labs   Lab Test  06/05/17   1432  04/04/17   0625  04/01/17   1420   11/19/10   1720   HGB  12.3  11.8  12.1   < >  13.0   PLT  294  249  247   < >  246   INR   --    --   0.92   --   0.93   NA  139  145*  138   < >  138   POTASSIUM  3.8  3.7  3.8   < >  4.0   CR  0.71  0.80  0.66   < >  0.68    < > = values in this interval not displayed.        IMPRESSION:                                                    Reason for surgery/procedure: left upper eyelid basal cell carcinoma   Diagnosis/reason for consult: preoperative evaluation and medical risk assessment    The proposed surgical procedure is considered LOW risk.    REVISED CARDIAC RISK INDEX  The patient has the following serious cardiovascular risks for perioperative complications such as (MI, PE, VFib and 3  AV Block):  No serious cardiac risks  INTERPRETATION: 0 risks: Class I (very low risk - 0.4% complication rate)    The patient has the following additional risks for perioperative complications:  No " identified additional risks      ICD-10-CM    1. Preop general physical exam Z01.818    2. Nodular basal cell carcinoma C44.91    3. Mild major depression (H) F32.0    4. Acute non intractable tension-type headache G44.209    5. Prothrombin mutation (H) D68.52      Basal cell carcinoma of the left eyelid- having MOHS surgery for removal.        RECOMMENDATIONS:                                                          --Patient is to take all scheduled medications on the day of surgery EXCEPT for modifications listed below.    Anticoagulant or Antiplatelet Medication Use  ASPIRIN: Discontinue ASA 5 days prior to procedure to reduce bleeding risk.  It should be resumed post-operatively.        APPROVAL GIVEN to proceed with proposed procedure, without further diagnostic evaluation       Signed Electronically by: Anna Naidu MD    Copy of this evaluation report is provided to requesting physician.    [facility] Preop Guidelines

## 2017-07-17 NOTE — PATIENT INSTRUCTIONS
Thank you for choosing [facility] [place].  You may be receiving a survey in the mail from Amina Irene regarding your visit today.  Please take a few minutes to complete and return the survey to let us know how we are doing.      Our Clinic hours are:  Mondays    7:20 am - 7 pm  Tues -  Fri  7:20 am - 5 pm    Clinic Phone: [phone]    The clinic lab opens at 7:30 am Mon - Fri and appointments are required.    [facility]. [phone]  Monday-Thursday 8 am - 7pm  Tues/Wed/Fri 8 am - 5:30 pm       Before Your Surgery      Call your surgeon if there is any change in your health. This includes signs of a cold or flu (such as a sore throat, runny nose, cough, rash or fever).    Do not smoke, drink alcohol or take over the counter medicine (unless your surgeon or primary care doctor tells you to) for the 24 hours before and after surgery.    If you take prescribed drugs: Follow your doctor s orders about which medicines to take and which to stop until after surgery.    Eating and drinking prior to surgery: follow the instructions from your surgeon    Take a shower or bath the night before surgery. Use the soap your surgeon gave you to gently clean your skin. If you do not have soap from your surgeon, use your regular soap. Do not shave or scrub the surgery site.  Wear clean pajamas and have clean sheets on your bed.

## 2017-07-17 NOTE — NURSING NOTE
"Chief Complaint   Patient presents with     Pre-Op Exam       Initial /73  Pulse 85  Resp 18  Ht 5' 3\" (1.6 m)  Wt 209 lb (94.8 kg)  LMP 07/03/2017 (Approximate)  Breastfeeding? No  BMI 37.02 kg/m2 Estimated body mass index is 37.02 kg/(m^2) as calculated from the following:    Height as of this encounter: 5' 3\" (1.6 m).    Weight as of this encounter: 209 lb (94.8 kg).  Medication Reconciliation: complete    "

## 2017-07-19 ENCOUNTER — TELEPHONE (OUTPATIENT)
Dept: DERMATOLOGY | Facility: CLINIC | Age: 42
End: 2017-07-19

## 2017-07-19 NOTE — TELEPHONE ENCOUNTER
History of Skin cancer : no    History of Mohs Surgery: no    History of a solid organ transplant: no Organ(s):  Year(s):  Creatinine:  Bone Marrow Transplant : n (year, )    Immunosuppressive Medications: no (if yes, which ones: )    HIV or Hepatitis B or C : no    Chronic lymphocytic leukemia: no    Diabetes: no (Type 1 or 2: )    Any Bleeding disorders: prothrombin mutation     Do you have a Pacemaker or Defibrillator: no (year placed: )    Artificial heart valve: no (mechanical or porcine: )    Joint Replacement in the last 2 years: no Joint(s):  Year(s):     Do you typically take Prophylactic Antibiotics before seeing a dentist or having a procedure?: no    If yes, verify that patient has the antibiotic on hand and instruct to take one hour before their surgery appointment.    If they do not have the antibiotic, verify the patients pharmacy and notify Dr. Granados by printing the pre-mohs call sheet.    Do you wear a C Pap Mask: no    If yes, and procedure is on the face, ask patient to bring in the mask with them (Mask only)    Do you have any mobility issues: no    If yes, is a van service is required to transport you to/from your appointment? no    Smoking History (tobacco of any sort): yes quit 20 years ago    Do you have any other health issues that we should know about? Stroke with labor 13 years ago, chronic migraines. SEVERE ANXIETY (pt will take     Do you take any of the following Blood Thinners:    Aspirin: no stopped 07/18/17    Plavix/Aggrastat/Brilinta: no    Warfarin: no (Last INR:  Date: )    Pradaxa/Eliquis/Xarelto: no    Ibuprofen (Advil/Motrin): no    Naproxen (Aleve): no    Vitamin E: no    Fish Oil: no    Ginkgo biloba: no    Other:no    yesPaient was instructed of the following: Ibuprofen, naproxen, Vitamin E, Fish Oil, and Ginkgo biloba should be stopped 1 week prior to and 1 week after the procedure.    Medication Allergies: see epic    Are medications in Epic: see epic     Pt is  following up with oculoplastics for closure Patient was reminded to take all medications as usual, other than those listed above, on the day of surgery    Yes-Patient was instructed to bring all medications to clinic on day of surgery    Yes-Patient will bring a     (A  is helpful for the following reasons: some patients need medications to relax, but once given, patients should not drive; sometimes bandages obstruct your vision making it difficult to see and unsafe to drive; the day can get long so it s nice to have a lidya; sometimes the procedure wears people out/makes them quite tired)    Yes- photoPhotograph of the surgical site(s) is/are available    Yes- Patient was reminded that this can be an all-day procedure and that no other appointments should be scheduled on the day of Mohs      Nayana Frazier RN

## 2017-07-24 ENCOUNTER — ANESTHESIA EVENT (OUTPATIENT)
Dept: SURGERY | Facility: AMBULATORY SURGERY CENTER | Age: 42
End: 2017-07-24

## 2017-07-25 ENCOUNTER — ANESTHESIA (OUTPATIENT)
Dept: SURGERY | Facility: AMBULATORY SURGERY CENTER | Age: 42
End: 2017-07-25

## 2017-07-25 ENCOUNTER — SURGERY (OUTPATIENT)
Age: 42
End: 2017-07-25

## 2017-07-25 ENCOUNTER — OFFICE VISIT (OUTPATIENT)
Dept: DERMATOLOGY | Facility: CLINIC | Age: 42
End: 2017-07-25

## 2017-07-25 ENCOUNTER — HOSPITAL ENCOUNTER (OUTPATIENT)
Facility: AMBULATORY SURGERY CENTER | Age: 42
End: 2017-07-25
Attending: OPHTHALMOLOGY

## 2017-07-25 VITALS
HEART RATE: 67 BPM | RESPIRATION RATE: 16 BRPM | DIASTOLIC BLOOD PRESSURE: 51 MMHG | TEMPERATURE: 97.9 F | WEIGHT: 209 LBS | HEIGHT: 63 IN | SYSTOLIC BLOOD PRESSURE: 98 MMHG | OXYGEN SATURATION: 96 % | BODY MASS INDEX: 37.03 KG/M2

## 2017-07-25 VITALS — SYSTOLIC BLOOD PRESSURE: 97 MMHG | HEART RATE: 73 BPM | DIASTOLIC BLOOD PRESSURE: 68 MMHG

## 2017-07-25 DIAGNOSIS — C44.1191: ICD-10-CM

## 2017-07-25 DIAGNOSIS — Z98.890 POSTOPERATIVE EYE STATE: Primary | ICD-10-CM

## 2017-07-25 DIAGNOSIS — F41.9 ANXIETY: Primary | ICD-10-CM

## 2017-07-25 RX ORDER — ERYTHROMYCIN 5 MG/G
OINTMENT OPHTHALMIC PRN
Status: DISCONTINUED | OUTPATIENT
Start: 2017-07-25 | End: 2017-07-25 | Stop reason: HOSPADM

## 2017-07-25 RX ORDER — NALOXONE HYDROCHLORIDE 0.4 MG/ML
.1-.4 INJECTION, SOLUTION INTRAMUSCULAR; INTRAVENOUS; SUBCUTANEOUS
Status: DISCONTINUED | OUTPATIENT
Start: 2017-07-25 | End: 2017-07-26 | Stop reason: HOSPADM

## 2017-07-25 RX ORDER — LIDOCAINE 40 MG/G
CREAM TOPICAL
Status: DISCONTINUED | OUTPATIENT
Start: 2017-07-25 | End: 2017-07-25 | Stop reason: HOSPADM

## 2017-07-25 RX ORDER — ONDANSETRON 2 MG/ML
INJECTION INTRAMUSCULAR; INTRAVENOUS PRN
Status: DISCONTINUED | OUTPATIENT
Start: 2017-07-25 | End: 2017-07-25

## 2017-07-25 RX ORDER — HYDROCODONE BITARTRATE AND ACETAMINOPHEN 5; 325 MG/1; MG/1
1 TABLET ORAL EVERY 6 HOURS PRN
Qty: 10 TABLET | Refills: 0 | Status: SHIPPED | OUTPATIENT
Start: 2017-07-25 | End: 2017-09-06

## 2017-07-25 RX ORDER — SODIUM CHLORIDE, SODIUM LACTATE, POTASSIUM CHLORIDE, CALCIUM CHLORIDE 600; 310; 30; 20 MG/100ML; MG/100ML; MG/100ML; MG/100ML
INJECTION, SOLUTION INTRAVENOUS CONTINUOUS
Status: DISCONTINUED | OUTPATIENT
Start: 2017-07-25 | End: 2017-07-25 | Stop reason: HOSPADM

## 2017-07-25 RX ORDER — ERYTHROMYCIN 5 MG/G
OINTMENT OPHTHALMIC
Qty: 3.5 G | Refills: 0 | Status: SHIPPED | OUTPATIENT
Start: 2017-07-25 | End: 2017-09-12

## 2017-07-25 RX ORDER — DIAZEPAM 10 MG
10 TABLET ORAL ONCE
Qty: 1 TABLET | Refills: 0 | Status: SHIPPED | OUTPATIENT
Start: 2017-07-25 | End: 2017-07-25

## 2017-07-25 RX ORDER — PROPOFOL 10 MG/ML
INJECTION, EMULSION INTRAVENOUS CONTINUOUS PRN
Status: DISCONTINUED | OUTPATIENT
Start: 2017-07-25 | End: 2017-07-25

## 2017-07-25 RX ORDER — BUPIVACAINE HYDROCHLORIDE 5 MG/ML
INJECTION, SOLUTION PERINEURAL PRN
Status: DISCONTINUED | OUTPATIENT
Start: 2017-07-25 | End: 2017-07-25 | Stop reason: HOSPADM

## 2017-07-25 RX ORDER — FENTANYL CITRATE 50 UG/ML
25-50 INJECTION, SOLUTION INTRAMUSCULAR; INTRAVENOUS
Status: DISCONTINUED | OUTPATIENT
Start: 2017-07-25 | End: 2017-07-26 | Stop reason: HOSPADM

## 2017-07-25 RX ORDER — LIDOCAINE HYDROCHLORIDE AND EPINEPHRINE 15; 5 MG/ML; UG/ML
INJECTION, SOLUTION EPIDURAL PRN
Status: DISCONTINUED | OUTPATIENT
Start: 2017-07-25 | End: 2017-07-25 | Stop reason: HOSPADM

## 2017-07-25 RX ORDER — PROPOFOL 10 MG/ML
INJECTION, EMULSION INTRAVENOUS PRN
Status: DISCONTINUED | OUTPATIENT
Start: 2017-07-25 | End: 2017-07-25

## 2017-07-25 RX ORDER — ONDANSETRON 4 MG/1
4 TABLET, ORALLY DISINTEGRATING ORAL EVERY 30 MIN PRN
Status: DISCONTINUED | OUTPATIENT
Start: 2017-07-25 | End: 2017-07-26 | Stop reason: HOSPADM

## 2017-07-25 RX ORDER — NEOMYCIN SULFATE, POLYMYXIN B SULFATE AND DEXAMETHASONE 3.5; 10000; 1 MG/ML; [USP'U]/ML; MG/ML
1 SUSPENSION/ DROPS OPHTHALMIC 4 TIMES DAILY
Qty: 1 BOTTLE | Refills: 0 | Status: SHIPPED | OUTPATIENT
Start: 2017-07-25 | End: 2017-09-12

## 2017-07-25 RX ORDER — ACETAMINOPHEN 10 MG/ML
INJECTION, SOLUTION INTRAVENOUS PRN
Status: DISCONTINUED | OUTPATIENT
Start: 2017-07-25 | End: 2017-07-25

## 2017-07-25 RX ORDER — LIDOCAINE HYDROCHLORIDE 20 MG/ML
INJECTION, SOLUTION INFILTRATION; PERINEURAL PRN
Status: DISCONTINUED | OUTPATIENT
Start: 2017-07-25 | End: 2017-07-25

## 2017-07-25 RX ORDER — FENTANYL CITRATE 50 UG/ML
INJECTION, SOLUTION INTRAMUSCULAR; INTRAVENOUS PRN
Status: DISCONTINUED | OUTPATIENT
Start: 2017-07-25 | End: 2017-07-25

## 2017-07-25 RX ORDER — ONDANSETRON 2 MG/ML
4 INJECTION INTRAMUSCULAR; INTRAVENOUS EVERY 30 MIN PRN
Status: DISCONTINUED | OUTPATIENT
Start: 2017-07-25 | End: 2017-07-26 | Stop reason: HOSPADM

## 2017-07-25 RX ORDER — KETOROLAC TROMETHAMINE 30 MG/ML
INJECTION, SOLUTION INTRAMUSCULAR; INTRAVENOUS PRN
Status: DISCONTINUED | OUTPATIENT
Start: 2017-07-25 | End: 2017-07-25

## 2017-07-25 RX ORDER — ACETAMINOPHEN 325 MG/1
975 TABLET ORAL ONCE
Status: DISCONTINUED | OUTPATIENT
Start: 2017-07-25 | End: 2017-07-25 | Stop reason: HOSPADM

## 2017-07-25 RX ORDER — MEPERIDINE HYDROCHLORIDE 25 MG/ML
12.5 INJECTION INTRAMUSCULAR; INTRAVENOUS; SUBCUTANEOUS
Status: DISCONTINUED | OUTPATIENT
Start: 2017-07-25 | End: 2017-07-26 | Stop reason: HOSPADM

## 2017-07-25 RX ORDER — SODIUM CHLORIDE, SODIUM LACTATE, POTASSIUM CHLORIDE, CALCIUM CHLORIDE 600; 310; 30; 20 MG/100ML; MG/100ML; MG/100ML; MG/100ML
INJECTION, SOLUTION INTRAVENOUS CONTINUOUS
Status: DISCONTINUED | OUTPATIENT
Start: 2017-07-25 | End: 2017-07-26 | Stop reason: HOSPADM

## 2017-07-25 RX ORDER — FENTANYL CITRATE 50 UG/ML
25-50 INJECTION, SOLUTION INTRAMUSCULAR; INTRAVENOUS
Status: DISCONTINUED | OUTPATIENT
Start: 2017-07-25 | End: 2017-07-25 | Stop reason: HOSPADM

## 2017-07-25 RX ADMIN — LIDOCAINE HYDROCHLORIDE 100 MG: 20 INJECTION, SOLUTION INFILTRATION; PERINEURAL at 14:12

## 2017-07-25 RX ADMIN — PROPOFOL 30 MG: 10 INJECTION, EMULSION INTRAVENOUS at 14:14

## 2017-07-25 RX ADMIN — ACETAMINOPHEN 1000 MG: 10 INJECTION, SOLUTION INTRAVENOUS at 14:46

## 2017-07-25 RX ADMIN — FENTANYL CITRATE 25 MCG: 50 INJECTION, SOLUTION INTRAMUSCULAR; INTRAVENOUS at 14:33

## 2017-07-25 RX ADMIN — ONDANSETRON 4 MG: 2 INJECTION INTRAMUSCULAR; INTRAVENOUS at 14:12

## 2017-07-25 RX ADMIN — SODIUM CHLORIDE, SODIUM LACTATE, POTASSIUM CHLORIDE, CALCIUM CHLORIDE: 600; 310; 30; 20 INJECTION, SOLUTION INTRAVENOUS at 14:05

## 2017-07-25 RX ADMIN — BUPIVACAINE HYDROCHLORIDE 1 ML: 5 INJECTION, SOLUTION PERINEURAL at 14:17

## 2017-07-25 RX ADMIN — PROPOFOL 200 MCG/KG/MIN: 10 INJECTION, EMULSION INTRAVENOUS at 14:14

## 2017-07-25 RX ADMIN — KETOROLAC TROMETHAMINE 30 MG: 30 INJECTION, SOLUTION INTRAMUSCULAR; INTRAVENOUS at 14:49

## 2017-07-25 RX ADMIN — ERYTHROMYCIN 1 INCH: 5 OINTMENT OPHTHALMIC at 14:40

## 2017-07-25 RX ADMIN — PROPOFOL 200 MG: 10 INJECTION, EMULSION INTRAVENOUS at 14:27

## 2017-07-25 ASSESSMENT — PAIN SCALES - GENERAL
PAINLEVEL: NO PAIN (0)
PAINLEVEL: NO PAIN (0)

## 2017-07-25 NOTE — IP AVS SNAPSHOT
[facility]    [address] [place] [id number]    Phone:  [phone]    Fax:  [phone]                                       After Visit Summary   7/25/2017    Doretha Fernandez    MRN: [id number]           After Visit Summary Signature Page     I have received my discharge instructions, and my questions have been answered. I have discussed any challenges I see with this plan with the nurse or doctor.    ..........................................................................................................................................  Patient/Patient Representative Signature      ..........................................................................................................................................  Patient Representative Print Name and Relationship to Patient    ..................................................               ................................................  Date                                            Time    ..........................................................................................................................................  Reviewed by Signature/Title    ...................................................              ..............................................  Date                                                            Time

## 2017-07-25 NOTE — ANESTHESIA PREPROCEDURE EVALUATION
Anesthesia Evaluation     .             ROS/MED HX    ENT/Pulmonary:     (+)asthma , . .    Neurologic:     (+)CVA TIA     Cardiovascular:         METS/Exercise Tolerance:     Hematologic:         Musculoskeletal:         GI/Hepatic:     (+) GERD       Renal/Genitourinary:         Endo:         Psychiatric:         Infectious Disease:         Malignancy:         Other:    (+) No chance of pregnancy C-spine cleared: N/A, no H/O Chronic Pain,no other significant disability                    Physical Exam  Normal systems: dental    Airway   Mallampati: II    Dental     Cardiovascular   Rhythm and rate: regular and normal      Pulmonary                     Anesthesia Plan      History & Physical Review  History and physical reviewed and following examination; no interval change.    ASA Status:  2 .    NPO Status:  > 8 hours    Plan for MAC with Intravenous and Propofol induction. Reason for MAC:  Difficulty with conscious sedation (QS)  PONV prophylaxis:  Ondansetron (or other 5HT-3)       Postoperative Care  Postoperative pain management:  IV analgesics.      Consents  Anesthetic plan, risks, benefits and alternatives discussed with:  Patient..                          .

## 2017-07-25 NOTE — DISCHARGE INSTRUCTIONS
Post-operative Instructions  Ophthalmic Plastic and Reconstructive Surgery    Kisha Bosch M.D.     All instructions apply to the operated eye(s) or eyelid(s).    Wound care and personal care  ? If a patch or bandage has been placed, please leave this in place until seen by your physician. Ensure that the bandage does not get wet when you take a shower.  ? Apply ice compresses 15 minutes on 15 minutes off while awake for 2 days, then switch to warm water compresses 4 times a day until seen by your physician. For warm packs you can place a cup of dry uncooked rice in a clean cotton sock. Then place sock in microwave 30 seconds to one minute. Next place the warm sock into a plastic bag and wrap the bag with clean warm wet washcloth and place over operated eye.    ? You may shower or wash your hair the day after surgery. Do not bathe or go swimming for 1 week to prevent contamination of your wounds.  ? Do not apply make-up to the eyes or eyelids for 2 weeks after surgery.  ? Expect some swelling, bruising, black eye (even into the lower eyelids and cheeks). Also expect serum caking, crusting and discharge from the eye and/or incisions. A small amount of surface bleeding is normal for the first 48 hours.  ? Your eye(s) and eyelid(s) may be painful and tender. This is normal after surgery.  Use the pain medication as prescribed. If your pain does not improve despite the  medication, contact the office.    Contact information and follow-up  ? Return to the Eye Clinic for a follow-up appointment with your physician as  scheduled. If no appointment has been scheduled:   - [facility] eye clinic: [phone] for an appointment with Dr. Bosch within 1 to 2 weeks from your date of surgery.   -  [facility] eye clinic: [phone] for an appointment with Dr. Bosch within 1 to 2 weeks from your date of surgery.     ? For severe pain, bleeding, or loss of vision, call the [facility]  [facility] at [phone] or [facility] at [phone].     After hours or on weekends and holidays, call [phone] and ask to speak with the ophthalmologist on call.    An on call person can be reached after hours for concerns. The on call doctor should not call in medication refill requests after hours or on weekends, so please plan accordingly. An effort has been made to provide adequate pain medications following every surgery, and refills will not be provided in most instances. Narcotic pain medications cannot be called in.     Activity restrictions and driving  ? Avoid heavy lifting, bending, exercise or strenuous activity for 1 week after surgery.  You may resume other activities and return to work as tolerated.  ? You may not resume driving until have you stopped using narcotic pain medications (such as Norco, Percocet, Tylenol #3).    Medications  ? Restart all your regular home medications and eye drops. If you take Plavix or  Aspirin on a regular basis, wait for 72 hours after your surgery before restarting these in order to decrease the risk of bleeding complications.  ? Avoid aspirin and aspirin-like medications (Motrin, Aleve, Ibuprofen, Lulú-  [place] etc) for 72 hours to reduce the risk of bleeding. You may take Tylenol  (acetaminophen) for pain.  ? In addition to your home medications, take the following post-operative medications as prescribed by your physician.    ? Apply antibiotic ointment to all sutures three times a day, and into the operated eye(s) at night.  ? Instill eye drops 3 times a day for 10 days.   ? Take pain pills at prescribed frequency as needed for pain.    ? WARNING: All the prescription pain medications listed above contain Tylenol  (acetaminophen). You must not take more than 4,000 mg of acetaminophen per  24-hour period. This is equivalent to 6 tablets of Darvocet, 12 tablets of Norco, Percocet or Tylenol #3. If you take other over-the-counter  "medications containing acetaminophen, you must take the amount of acetaminophen into account and reduce the number of prescribed pain pills accordingly.  ? The prescribed medications may make you drowsy. You must not drive a car,  operate heavy machinery or drink alcohol while taking them.  ? The prescribed pain medications may cause constipation and nausea. Take them  with some food to prevent a stomach upset. If you continue to experience nausea, call your physician.      [facility]  Home Care Following Anesthesia  For 24 hours after surgery:  1. Get plenty of rest.  A responsible adult must stay with you for at least 24 hours after you leave the surgery center.  2. Do not drive or use heavy equipment.  If you have weakness or tingling, don't drive or use heavy equipment until this feeling goes away.   3. Do not drink alcohol.   4. Avoid strenuous or risky activities.  Ask for help when climbing stairs.  5. You may feel lightheaded.  IF so, sit for a few minutes before standing.  Have someone help you get up.   6. If you have nausea (feel sick to your stomach): Drink only clear liquids such as apple juice, ginger ale, broth or 7-Up.  Rest may also help.  Be sure to drink enough fluids.  Move to a regular diet as you feel able.   7. You may have a slight fever.  Call the doctor if your fever is over 100 F (37.7 C) (taken under the tongue) or lasts longer than 24 hours.  8. You may have a dry mouth, a sore throat, muscle aches or trouble sleeping. These should go away after 24 hours.  9. Do not make important or legal decisions.   Today you received a Marcaine or bupivacaine block to numb the nerves near your surgery site.  This is a block using local anesthetic or \"numbing\" medication injected around the nerves to anesthetize or \"numb\" the area supplied by those nerves.  This block is injected into the muscle layer near your surgical site.  The medication may numb the location " where you had surgery for 6-18 hours, but may last up to 24 hours.  If your surgical site is an arm or leg you should be careful with your affected limb, since it is possible to injure your limb without being aware of it due to the numbing.  Until full feeling returns, you should guard against bumping or hitting your limb, and avoid extreme hot or cold temperatures on the skin.  As the block wears off, the feeling will return as a tingling or prickly sensation near your surgical site.  You will experience more discomfort from your incision as the feeling returns.  You may want to take a pain pill (a narcotic or Tylenol if this was prescribed by your surgeon) when you start to experience mild pain before the pain beccomes more severe.  If your pain medications do not control your pain you should notifiy your surgeon.            Tips for taking pain medications  To get the best pain relief possible, remember these points:    Take pain medications as directed, before pain becomes severe.    Pain medication can upset your stomach: taking it with food may help.    Constipation is a common side effect of pain medication. Drink plenty of  fluids.    Eat foods high in fiber. Take a stool softener if recommended by your doctor or pharmacist.    Do not drink alcohol, drive or operate machinery while taking pain medications.    Ask about other ways to control pain, such as with heat, ice or relaxation.    Tylenol/Acetaminophen Consumption  To help encourage the safe use of acetaminophen, the makers of TYLENOL  have lowered the maximum daily dose for single-ingredient Extra Strength TYLENOL  (acetaminophen) products sold in the [place] from 8 pills per day (4,000 mg) to 6 pills per day (3,000 mg). The dosing interval has also changed from 2 pills every 4-6 hours to 2 pills every 6 hours.    If you feel your pain relief is insufficient, you may take Tylenol/Acetaminophen in addition to your narcotic pain medication.     Be careful  not to exceed 3,000 mg of Tylenol/Acetaminophen in a 24 hour period from all sources.    If you are taking extra strength Tylenol/acetaminophen (500 mg), the maximum dose is 6 tablets in 24 hours.    If you are taking regular strength acetaminophen (325 mg), the maximum dose is 9 tablets in 24 hours.    Call a doctor for any of the followin. Signs of infection (fever, growing tenderness at the surgery site, a large amount of drainage or bleeding, severe pain, foul-smelling drainage, redness, swelling).  2. It has been over 8 to 10 hours since surgery and you are still not able to urinate (pass water).  3. Headache for over 24 hours.  4. Numbness, tingling or weakness the day after surgery (if you had spinal anesthesia).  Your doctor is:       Dr. Kisha Bosch, Ophthalmology: [phone]               Or dial [phone] and ask for the resident on call for:  Ophthalmology  For emergency care, call the:  [facility] [place]:  [phone] (TTY for hearing impaired: [phone])

## 2017-07-25 NOTE — ANESTHESIA CARE TRANSFER NOTE
Patient: Doretha Fernandez    Procedure(s):  Left Upper Lid Moh's Reconstruction - Wound Class: I-Clean    Diagnosis: Basil Cell Carcinoma  Diagnosis Additional Information: No value filed.    Anesthesia Type:   General     Note:  Airway :Nasal Cannula  Patient transferred to:PACU  Comments: Uneventful transport to PACU; VSS; Report given to RN; Pt comfortable; IV patent: pt exchanging well      Vitals: (Last set prior to Anesthesia Care Transfer)    CRNA VITALS  7/25/2017 1429 - 7/25/2017 1505      7/25/2017             Pulse: 81    SpO2: 95 %                Electronically Signed By: AMADA Britton CRNA  July 25, 2017  3:05 PM

## 2017-07-25 NOTE — PROGRESS NOTES
[facility] Mohs Micrographic Surgery Note:  Jul 25, 2017  Doretha Fernandez is a 42 year old female who was referred by Dr. Bosch for Mohs excision of a basal cell carcinoma located on the left upper eyelid. The lesion was excised using Mohs micrographic surgery in 2 stage(s), and repair of the Mohs defect was deferred to oculoplastics. Bacitracin ophthalmic ointment and a bandage placed. Please refer to the operative report(s).     The patient will follow up with oculoplastics for wound care.    Follow up with Dermatology for first skin exam.    Thank you for the opportunity to see and treat your patient. Please do not hesitate to contact me with any questions or concerns regarding the findings and/or operation.    I, Victoriano Dao, am serving as a scribe to document services personally performed by Dr. Halley Granados M.D., based on data collection and the provider's statements to me.     Provider Disclosure:   I have reviewed the content of the documentation above and have edited it as needed. I have personally performed the services documented herein and the documentation accurately represents those services provided and the decisions that I have made.       Halley Granados MD  , Department of Dermatology  Director, D[facility]at[facility]  Phone: [phone]; Fax: [phone]  [email or web address]   [address] [place] [address]   Mail Code [address] [address]

## 2017-07-25 NOTE — NURSING NOTE
Chief Complaint   Patient presents with     Skin Cancer     Doretha is here today for MOHS on the left upper eyelid     Floresita Fuller CMA

## 2017-07-25 NOTE — ANESTHESIA POSTPROCEDURE EVALUATION
Patient: Doretha Fernandez    Procedure(s):  Left Upper Lid Moh's Reconstruction - Wound Class: I-Clean    Diagnosis:Basil Cell Carcinoma  Diagnosis Additional Information: No value filed.    Anesthesia Type:  General    Note:  Anesthesia Post Evaluation    Patient location during evaluation: PACU  Patient participation: Able to fully participate in evaluation  Level of consciousness: anxious  Pain management: adequate  Airway patency: patent  Cardiovascular status: acceptable  Respiratory status: acceptable  Hydration status: acceptable  PONV: none             Last vitals:  Vitals:    07/25/17 1535 07/25/17 1545 07/25/17 1550   BP:  100/60 97/53   Pulse:      Resp:  16 16   Temp:  36.9  C (98.4  F) 36.4  C (97.6  F)   SpO2: 94% 96% 96%         Electronically Signed By: Jt Lala MD  July 25, 2017  4:04 PM

## 2017-07-25 NOTE — LETTER
7/25/2017       RE: Doretha Fernandez  [address] [address] [address]     Dear Colleague,    Thank you for referring your patient, Doretha Fernandez, to the [facility] DERMATOLOGIC SURGERY at [facility]. Please see a copy of my visit note below.    [facility] Mohs Micrographic Surgery Note:  Jul 25, 2017  Doretha Fernandez is a 42 year old female who was referred by Dr. Bosch for Mohs excision of a basal cell carcinoma located on the left upper eyelid. The lesion was excised using Mohs micrographic surgery in 2 stage(s), and repair of the Mohs defect was deferred to oculoplastics. Bacitracin ophthalmic ointment and a bandage placed. Please refer to the operative report(s).     The patient will follow up with oculoplastics for wound care.    Follow up with Dermatology for first skin exam.    Thank you for the opportunity to see and treat your patient. Please do not hesitate to contact me with any questions or concerns regarding the findings and/or operation.    I, Victoriano Dao, am serving as a scribe to document services personally performed by Dr. Halley Granados M.D., based on data collection and the provider's statements to me.     Provider Disclosure:   I have reviewed the content of the documentation above and have edited it as needed. I have personally performed the services documented herein and the documentation accurately represents those services provided and the decisions that I have made.       Halley Granados MD  , Department of Dermatology  Director, Dermatologic Surgery [facility]  Phone: [phone]; Fax: [phone]  [email or web address] Surgery [facility]   [address] [place] [address]   Mail Code [id number]   [place] [address]           MOHS MICROGRAPHIC SURGERY REPORT   Jul 25, 2017    Surgeon: Halley Granados MD  Fellow:    Resident:     INDICATION:    Preoperative  Diagnosis: basal cell carcinoma  Location: left upper eyelid  Postoperative Diagnosis: Same  Preoperative Lesion size: 0.5 x 0.4 cm    After appropriate discussion and informed consent for Mohs surgery and possible repair of the Mohs surgery defect, the patient underwent Mohs surgery as follows:    STAGE I:  The patient was placed on the operating room table.  The area was cleansed with chlorhexidine and infiltrated with 1% Lidocaine and epinephrine. Tumor was debulked. Using a #15-blade, complete excision was made around the tumor in 1 section.  Hemostasis was obtained by electrodesiccation.  A dressing was placed.  Tissue was divided into 2 tissue blocks that were subsequently mapped, color coded and processed in the Mohs Laboratory.  Microscopic tumor (BCC) was found in 1 of the tissue blocks.    STAGE II: The patient returned to the operating room.  By reference to the Mohs map, the area of positivity was delineated, infiltrated with the anesthetic mixture described above and excised in 1 section. Tissue was divided into 1 tissue blocks that were again mapped, color coded and processed in the Mohs Laboratory.  Hemostasis was obtained in the usual manner and a dressing placed.  Microscopic tumor was not found in the tissue..    With the lesion clear of micrographic tumor, surgery was considered complete.  The defect extended to the muscularis and measured 0.5 x 0.6 cm.    OCULOPLASTIC SURGERY REFERRAL  Estimated blood loss, minimal; complications, none. Bacitracin ophthalmic ointment and a bandage placed. Patient was discharged in good condition to oculoplastic surgery for reconstruction of the Mohs defect.       Victoriano WILLIAM, am serving as a scribe to document services personally performed by Dr. Halley Granados M.D., based on data collection and the provider's statements to me.     Provider Disclosure:   I have reviewed the content of the documentation above and have edited it as needed. I have  personally performed the services documented herein and the documentation accurately represents those services provided and the decisions that I have made.       Halley Granados MD  , Department of Dermatology  Director, Dermatologic Surgery [facility]  Phone: [phone]; Fax: [phone]  [email or web address]   [address] [place] [address]   Mail Code [address], [place] [address]

## 2017-07-25 NOTE — PROGRESS NOTES
MOHS MICROGRAPHIC SURGERY REPORT   Jul 25, 2017    Surgeon: Halley Granados MD  Fellow:    Resident:     INDICATION:    Preoperative Diagnosis: basal cell carcinoma  Location: left upper eyelid  Postoperative Diagnosis: Same  Preoperative Lesion size: 0.5 x 0.4 cm    After appropriate discussion and informed consent for Mohs surgery and possible repair of the Mohs surgery defect, the patient underwent Mohs surgery as follows:    STAGE I:  The patient was placed on the operating room table.  The area was cleansed with chlorhexidine and infiltrated with 1% Lidocaine and epinephrine. Tumor was debulked. Using a #15-blade, complete excision was made around the tumor in 1 section.  Hemostasis was obtained by electrodesiccation.  A dressing was placed.  Tissue was divided into 2 tissue blocks that were subsequently mapped, color coded and processed in the Mohs Laboratory.  Microscopic tumor (BCC) was found in 1 of the tissue blocks.    STAGE II: The patient returned to the operating room.  By reference to the Mohs map, the area of positivity was delineated, infiltrated with the anesthetic mixture described above and excised in 1 section. Tissue was divided into 1 tissue blocks that were again mapped, color coded and processed in the Mohs Laboratory.  Hemostasis was obtained in the usual manner and a dressing placed.  Microscopic tumor was not found in the tissue..    With the lesion clear of micrographic tumor, surgery was considered complete.  The defect extended to the muscularis and measured 0.5 x 0.6 cm.    OCULOPLASTIC SURGERY REFERRAL  Estimated blood loss, minimal; complications, none. Bacitracin ophthalmic ointment and a bandage placed. Patient was discharged in good condition to oculoplastic surgery for reconstruction of the Mohs defect.       Victoriano WILLIAM am serving as a scribe to document services personally performed by Dr. Halley Granados M.D., based on data collection and the provider's  statements to me.     Provider Disclosure:   I have reviewed the content of the documentation above and have edited it as needed. I have personally performed the services documented herein and the documentation accurately represents those services provided and the decisions that I have made.       Halley Granados MD  , Department of Dermatology  Director, Dermatologic Surgery [facility]  Phone: [phone]; Fax: [phone]  [email or web address] Surgery [email or web address]   [address] [place] [address]   Mail Code [place], [place] [address]

## 2017-07-25 NOTE — NURSING NOTE
The patient was escorted in a wheelchair to the 5th floor and she was checked in for closure with Oculoplastics.   Floresita Fuller CMA

## 2017-07-25 NOTE — NURSING NOTE
1st layer performed on the Left Upper Eyelid. Injected 0.5ml of Xylocaine  (Lidocaine 1.5% and Epinephrine  (1:200,000)      Present for procedure:    Dr. Roberta Fuller CMA      The patient was given two Tylenol Extra Strength at 905am with water per Dr. Granados's orders.   Floresita Fuller CMA

## 2017-07-25 NOTE — IP AVS SNAPSHOT
MRN:[id number]                      After Visit Summary   7/25/2017    Doretha Fernandez    MRN: [id number]           Thank you!     Thank you for choosing [facility] for your care. Our goal is always to provide you with excellent care. Hearing back from our patients is one way we can continue to improve our services. Please take a few minutes to complete the written survey that you may receive in the mail after you visit with us. Thank you!        Patient Information     Date Of Birth          1975        About your hospital stay     You were admitted on:  July 25, 2017 [facility]    You were discharged on:  July 25, 2017       Who to Call     For medical emergencies, please call 911.  For non-urgent questions about your medical care, please call your primary care provider or clinic, [phone]  For questions related to your surgery, please call your surgery clinic        Attending Provider     Provider Kisha Walters MD Ophthalmology       Primary Care Provider Office Phone # Fax #    Anna Naidu [phone]      After Care Instructions     Discharge Medication Instructions       Do NOT take aspirin or medications containing NSAIDS for 72 hours after procedure.            Ice to affected area       Apply cold pack for 15 minutes on, 15 minutes off, for 48 hours while awake.                  Your next 10 appointments already scheduled     Aug 02, 2017 10:00 AM CDT   Return Visit with Bairon Miranda MD   [facility] ([facility])    [address], [address]  [place] [facility] [address] [phone]              Further instructions from your care team       Post-operative Instructions  Ophthalmic Plastic and Reconstructive Surgery    Kisha Bosch M.D.     All instructions apply to the operated eye(s) or eyelid(s).    Wound care  and personal care  ? If a patch or bandage has been placed, please leave this in place until seen by your physician. Ensure that the bandage does not get wet when you take a shower.  ? Apply ice compresses 15 minutes on 15 minutes off while awake for 2 days, then switch to warm water compresses 4 times a day until seen by your physician. For warm packs you can place a cup of dry uncooked rice in a clean cotton sock. Then place sock in microwave 30 seconds to one minute. Next place the warm sock into a plastic bag and wrap the bag with clean warm wet washcloth and place over operated eye.    ? You may shower or wash your hair the day after surgery. Do not bathe or go swimming for 1 week to prevent contamination of your wounds.  ? Do not apply make-up to the eyes or eyelids for 2 weeks after surgery.  ? Expect some swelling, bruising, black eye (even into the lower eyelids and cheeks). Also expect serum caking, crusting and discharge from the eye and/or incisions. A small amount of surface bleeding is normal for the first 48 hours.  ? Your eye(s) and eyelid(s) may be painful and tender. This is normal after surgery.  Use the pain medication as prescribed. If your pain does not improve despite the  medication, contact the office.    Contact information and follow-up  ? Return to the Eye Clinic for a follow-up appointment with your physician as  scheduled. If no appointment has been scheduled:   - [facility] eye clinic: [phone] for an appointment with Dr. Bosch within 1 to 2 weeks from your date of surgery.   -  [facility] eye clinic: [phone] for an appointment with Dr. Bosch within 1 to 2 weeks from your date of surgery.     ? For severe pain, bleeding, or loss of vision, call the [facility] at [phone] or [facility] at [phone].     After hours or on weekends and holidays, call [phone] and ask to speak with the  ophthalmologist on call.    An on call person can be reached after hours for concerns. The on call doctor should not call in medication refill requests after hours or on weekends, so please plan accordingly. An effort has been made to provide adequate pain medications following every surgery, and refills will not be provided in most instances. Narcotic pain medications cannot be called in.     Activity restrictions and driving  ? Avoid heavy lifting, bending, exercise or strenuous activity for 1 week after surgery.  You may resume other activities and return to work as tolerated.  ? You may not resume driving until have you stopped using narcotic pain medications (such as Norco, Percocet, Tylenol #3).    Medications  ? Restart all your regular home medications and eye drops. If you take Plavix or  Aspirin on a regular basis, wait for 72 hours after your surgery before restarting these in order to decrease the risk of bleeding complications.  ? Avoid aspirin and aspirin-like medications (Motrin, Aleve, Ibuprofen, Lulú-  Alexander etc) for 72 hours to reduce the risk of bleeding. You may take Tylenol  (acetaminophen) for pain.  ? In addition to your home medications, take the following post-operative medications as prescribed by your physician.    ? Apply antibiotic ointment to all sutures three times a day, and into the operated eye(s) at night.  ? Instill eye drops 3 times a day for 10 days.   ? Take pain pills at prescribed frequency as needed for pain.    ? WARNING: All the prescription pain medications listed above contain Tylenol  (acetaminophen). You must not take more than 4,000 mg of acetaminophen per  24-hour period. This is equivalent to 6 tablets of Darvocet, 12 tablets of Norco, Percocet or Tylenol #3. If you take other over-the-counter medications containing acetaminophen, you must take the amount of acetaminophen into account and reduce the number of prescribed pain pills accordingly.  ? The prescribed  "medications may make you drowsy. You must not drive a car,  operate heavy machinery or drink alcohol while taking them.  ? The prescribed pain medications may cause constipation and nausea. Take them  with some food to prevent a stomach upset. If you continue to experience nausea, call your physician.      [facility]  Home Care Following Anesthesia  For 24 hours after surgery:  1. Get plenty of rest.  A responsible adult must stay with you for at least 24 hours after you leave the surgery center.  2. Do not drive or use heavy equipment.  If you have weakness or tingling, don't drive or use heavy equipment until this feeling goes away.   3. Do not drink alcohol.   4. Avoid strenuous or risky activities.  Ask for help when climbing stairs.  5. You may feel lightheaded.  IF so, sit for a few minutes before standing.  Have someone help you get up.   6. If you have nausea (feel sick to your stomach): Drink only clear liquids such as apple juice, ginger ale, broth or 7-Up.  Rest may also help.  Be sure to drink enough fluids.  Move to a regular diet as you feel able.   7. You may have a slight fever.  Call the doctor if your fever is over 100 F (37.7 C) (taken under the tongue) or lasts longer than 24 hours.  8. You may have a dry mouth, a sore throat, muscle aches or trouble sleeping. These should go away after 24 hours.  9. Do not make important or legal decisions.   Today you received a Marcaine or bupivacaine block to numb the nerves near your surgery site.  This is a block using local anesthetic or \"numbing\" medication injected around the nerves to anesthetize or \"numb\" the area supplied by those nerves.  This block is injected into the muscle layer near your surgical site.  The medication may numb the location where you had surgery for 6-18 hours, but may last up to 24 hours.  If your surgical site is an arm or leg you should be careful with your affected limb, since it is " possible to injure your limb without being aware of it due to the numbing.  Until full feeling returns, you should guard against bumping or hitting your limb, and avoid extreme hot or cold temperatures on the skin.  As the block wears off, the feeling will return as a tingling or prickly sensation near your surgical site.  You will experience more discomfort from your incision as the feeling returns.  You may want to take a pain pill (a narcotic or Tylenol if this was prescribed by your surgeon) when you start to experience mild pain before the pain beccomes more severe.  If your pain medications do not control your pain you should notifiy your surgeon.            Tips for taking pain medications  To get the best pain relief possible, remember these points:    Take pain medications as directed, before pain becomes severe.    Pain medication can upset your stomach: taking it with food may help.    Constipation is a common side effect of pain medication. Drink plenty of  fluids.    Eat foods high in fiber. Take a stool softener if recommended by your doctor or pharmacist.    Do not drink alcohol, drive or operate machinery while taking pain medications.    Ask about other ways to control pain, such as with heat, ice or relaxation.    Tylenol/Acetaminophen Consumption  To help encourage the safe use of acetaminophen, the makers of TYLENOL  have lowered the maximum daily dose for single-ingredient Extra Strength TYLENOL  (acetaminophen) products sold in the [place] from 8 pills per day (4,000 mg) to 6 pills per day (3,000 mg). The dosing interval has also changed from 2 pills every 4-6 hours to 2 pills every 6 hours.    If you feel your pain relief is insufficient, you may take Tylenol/Acetaminophen in addition to your narcotic pain medication.     Be careful not to exceed 3,000 mg of Tylenol/Acetaminophen in a 24 hour period from all sources.    If you are taking extra strength Tylenol/acetaminophen (500 mg), the maximum  "dose is 6 tablets in 24 hours.    If you are taking regular strength acetaminophen (325 mg), the maximum dose is 9 tablets in 24 hours.    Call a doctor for any of the followin. Signs of infection (fever, growing tenderness at the surgery site, a large amount of drainage or bleeding, severe pain, foul-smelling drainage, redness, swelling).  2. It has been over 8 to 10 hours since surgery and you are still not able to urinate (pass water).  3. Headache for over 24 hours.  4. Numbness, tingling or weakness the day after surgery (if you had spinal anesthesia).  Your doctor is:       Dr. Kisha Bosch, Ophthalmology: [phone]               Or dial [phone] and ask for the resident on call for:  Ophthalmology  For emergency care, call the:  [facility]:  [phone] (TTY for hearing impaired: [phone])                                Pending Results     No orders found from 2017 to 2017.            Admission Information     Date & Time Provider Department Dept. Phone    2017 Kisha Bosch MD [facility] [phone]      Your Vitals Were     Blood Pressure Pulse Temperature Respirations Height Weight    93/53 67 97.8  F (36.6  C) (Temporal) 16 1.6 m (5' 3\") 94.8 kg (209 lb)    Last Period Pulse Oximetry BMI (Body Mass Index)             2017 (Approximate) 96% 37.02 kg/m2         [employer] Information     [employer]in gives you secure access to your electronic health record. If you see a primary care provider, you can also send messages to your care team and make appointments. If you have questions, please call your primary care clinic.  If you do not have a primary care provider, please call [phone] and they will assist you.      [employer]ail[employer] is an electronic gateway that provides easy, online access to your medical records. With [employer], you can request a clinic appointment, read your test results, renew a prescription or communicate " with your care team.     To access your existing account, please contact your [facility] or call [phone] for assistance.        Care EveryWhere ID     This is your Care EveryWhere ID. This could be used by other organizations to access your [place] medical records  [id number]        Equal Access to Services     BRIGETTE QUARLES : Nicol ramos Soalphonso, waaxda luqadaha, qaybta kaalmada chris, pancho castillo. So [facility] [phone].    ATENCIÓN: Si habla español, tiene a mcdonnell disposición servicios gratuitos de asistencia lingüística. Diana al [phone].    We comply with applicable federal civil rights laws and [place] laws. We do not discriminate on the basis of race, color, national origin, age, disability sex, sexual orientation or gender identity.               Review of your medicines      UNREVIEWED medicines. Ask your doctor about these medicines        Dose / Directions    aspirin 81 MG EC tablet        Dose:  81 mg   Take 81 mg by mouth daily   Refills:  0       buPROPion 150 MG 24 hr tablet   Commonly known as:  WELLBUTRIN XL   Used for:  Mild major depression (H)        Dose:  150 mg   Take 1 tablet (150 mg) by mouth every morning   Quantity:  30 tablet   Refills:  1       FLUoxetine 20 MG capsule   Commonly known as:  PROzac   Used for:  Major depressive disorder, single episode, mild (H)        Dose:  60 mg   Take 3 capsules (60 mg) by mouth daily   Quantity:  270 capsule   Refills:  01       fluticasone 50 MCG/ACT spray   Commonly known as:  FLONASE   Used for:  Seasonal allergic rhinitis        Dose:  2 spray   Spray 2 sprays into both nostrils daily   Quantity:  1 g   Refills:  3       gabapentin 300 MG capsule   Commonly known as:  NEURONTIN   Used for:  Acute non intractable tension-type headache        Take 3 tablets (900 mg) every night.   Quantity:  270 capsule   Refills:  1       LORazepam 1 MG tablet   Commonly known as:   ATIVAN   Used for:  Anxiety, Acute intractable tension-type headache        Dose:  1 mg   Take 1 tablet (1 mg) by mouth every 8 hours as needed for anxiety   Quantity:  30 tablet   Refills:  0       methocarbamol 750 MG tablet   Commonly known as:  ROBAXIN        Dose:  750 mg   Take 1 tablet (750 mg) by mouth 3 times daily as needed for other (Back tightness)   Quantity:  30 tablet   Refills:  0       topiramate 25 MG tablet   Commonly known as:  TOPAMAX   Used for:  Intractable chronic migraine without aura and without status migrainosus        Take 100mg at bedtime for 2 weeks, then 125mg  At hs   Quantity:  180 tablet   Refills:  3       TYLENOL PO        Dose:  1000 mg   Take 1,000 mg by mouth every 6 hours as needed for mild pain or fever   Refills:  0       * VALIUM PO        Refills:  0       * diazepam 10 MG tablet   Commonly known as:  VALIUM   Used for:  Anxiety        Dose:  10 mg   Take 1 tablet (10 mg) by mouth once for 1 dose Take 30-60 minutes before procedure.  Do not operate a vehicle after taking this medication.   Quantity:  1 tablet   Refills:  0       * Notice:  This list has 2 medication(s) that are the same as other medications prescribed for you. Read the directions carefully, and ask your doctor or other care provider to review them with you.      START taking        Dose / Directions    erythromycin ophthalmic ointment   Commonly known as:  ROMYCIN   Used for:  Postoperative eye state        Apply small amount to incision sites three times daily until tube runs out.   Quantity:  3.5 g   Refills:  0       HYDROcodone-acetaminophen 5-325 MG per tablet   Commonly known as:  NORCO   Used for:  Postoperative eye state        Dose:  1 tablet   Take 1 tablet by mouth every 6 hours as needed for pain Maximum of 4000 mg of acetaminophen in 24 hours.   Quantity:  10 tablet   Refills:  0       neomycin-polymyxin-dexamethasone 3.5-08703-6.1 Susp ophthalmic susp   Commonly known as:  MAXITROL   Used  for:  Postoperative eye state        Dose:  1 drop   Place 1 drop Into the left eye 4 times daily   Quantity:  1 Bottle   Refills:  0            Where to get your medicines      These medications were sent to [place], [place] - [address]  [address], [place] [facility] [address]    Hours:  TRANSPLANT PHONE NUMBER [phone] Phone:  [phone]     erythromycin ophthalmic ointment    neomycin-polymyxin-dexamethasone 3.5-50473-1.1 Susp ophthalmic susp         Some of these will need a paper prescription and others can be bought over the counter. Ask your nurse if you have questions.     Bring a paper prescription for each of these medications     HYDROcodone-acetaminophen 5-325 MG per tablet                Protect others around you: Learn how to safely use, store and throw away your medicines at www.disposemymeds.org.             Medication List: This is a list of all your medications and when to take them. Check marks below indicate your daily home schedule. Keep this list as a reference.      Medications           Morning Afternoon Evening Bedtime As Needed    aspirin 81 MG EC tablet   Take 81 mg by mouth daily                                buPROPion 150 MG 24 hr tablet   Commonly known as:  WELLBUTRIN XL   Take 1 tablet (150 mg) by mouth every morning                                erythromycin ophthalmic ointment   Commonly known as:  ROMYCIN   Apply small amount to incision sites three times daily until tube runs out.   Last time this was given:  1 inch on 7/25/2017  2:40 PM                                FLUoxetine 20 MG capsule   Commonly known as:  PROzac   Take 3 capsules (60 mg) by mouth daily                                fluticasone 50 MCG/ACT spray   Commonly known as:  FLONASE   Spray 2 sprays into both nostrils daily                                gabapentin 300 MG capsule   Commonly known as:  NEURONTIN   Take 3 tablets (900 mg) every  night.                                HYDROcodone-acetaminophen 5-325 MG per tablet   Commonly known as:  NORCO   Take 1 tablet by mouth every 6 hours as needed for pain Maximum of 4000 mg of acetaminophen in 24 hours.                                LORazepam 1 MG tablet   Commonly known as:  ATIVAN   Take 1 tablet (1 mg) by mouth every 8 hours as needed for anxiety                                methocarbamol 750 MG tablet   Commonly known as:  ROBAXIN   Take 1 tablet (750 mg) by mouth 3 times daily as needed for other (Back tightness)                                neomycin-polymyxin-dexamethasone 3.5-07455-3.1 Susp ophthalmic susp   Commonly known as:  MAXITROL   Place 1 drop Into the left eye 4 times daily                                topiramate 25 MG tablet   Commonly known as:  TOPAMAX   Take 100mg at bedtime for 2 weeks, then 125mg  At hs                                TYLENOL PO   Take 1,000 mg by mouth every 6 hours as needed for mild pain or fever                                * VALIUM PO                                * diazepam 10 MG tablet   Commonly known as:  VALIUM   Take 1 tablet (10 mg) by mouth once for 1 dose Take 30-60 minutes before procedure.  Do not operate a vehicle after taking this medication.                                * Notice:  This list has 2 medication(s) that are the same as other medications prescribed for you. Read the directions carefully, and ask your doctor or other care provider to review them with you.

## 2017-07-25 NOTE — NURSING NOTE
2nd layer performed on the Left Upper Eyelid. Injected 1.0ml of Xylocaine  (Lidocaine 1.5% and Epinephrine  (1:200,000) Photos and measurements were taken for future use.       Present for procedure:  Dr. Roberta Bae, Fellow MD Floresita Fuller, [facility]

## 2017-07-25 NOTE — OP NOTE
PREOPERATIVE DIAGNOSIS: Left upper eyelid mass - basal cell carcinoma  POSTOPERATIVE DIAGNOSIS: Left upper eyelid mass - basal cell carcinoma  PROCEDURE: Wedge resection and reconstruction, left  upper eyelid (less than 25% of eyelid margin).   SURGEON: Kisha Bosch MD  ASSISTANT: Martínez Jaramillo MD  ANESTHESIA: Monitored with local infiltration of a 50/50 mixture of 2% lidocaine with epinephrine and 0.5% Marcaine.   COMPLICATIONS: None.   ESTIMATED BLOOD LOSS: Less than 5 cc.   SPECIMEN: None   INDICATIONS: Doretha Fernandez presented with a left  upper lid mass that was enlarging in size. Biopsy was consistent with basal cell carcinoma. She underwent Mohs earlier today, and presents for reconstruction. After the risks, benefits and alternatives to the proposed procedure were explained, informed consent was obtained from the patient.   DESCRIPTION OF PROCEDURE: The patient was brought to the operating room and placed supine on the operating table. IV sedation was given. The left upper lid was infiltrated with local anesthetic. The patient was prepped and draped in the typical fashion. Attention was directed to the left side. The area of the defect was inspected. There was a full thickness defect through the tarsus. A pentagonal wedge was completed to allow reapproximation of the eyelid margin. This was done using nithya scissors to complete the wedge through the full height of the tarsal plate. Hemostasis was obtained with a high-temperature cautery. The margin was reconstructed with an internal buried vertical mattress 7-0 Vicryl suture. Additional Vicryl sutures were placed at the lash line, and the tarsal plate was closed with partial thickness 5-0 Vicryl sutures. The skin was closed with interrupted 7-0 vicryl sutures. Erythromycin ophthalmic ointment was applied. The patient tolerated the procedure well and left the operating room in stable condition.   Kisha Bosch MD

## 2017-07-25 NOTE — NURSING NOTE
The patient was given 10mg of Diazepam at 1045am. The patient was observed taking the medication with water.   Floresita Fuller CMA

## 2017-07-25 NOTE — MR AVS SNAPSHOT
After Visit Summary   7/25/2017    Doretha Fernandez    MRN: [id number]           Patient Information     Date Of Birth          1975        Visit Information        Provider Department      7/25/2017 8:30 AM Halley Granados MD [facility] Dermatologic Surgery        Today's Diagnoses     Anxiety    -  1    Basal cell carcinoma of left upper eyelid, s/p Mohs excision 07/25/2017           Follow-ups after your visit        Your next 10 appointments already scheduled     Aug 24, 2017 10:00 AM CDT   Return Visit with Bairon Miranda MD   [facility] ([facility])    [address], [address]  [facility] [id number] [phone]            Sep 26, 2017 12:15 PM CDT   (Arrive by 12:00 PM)   RETURN PLASTICS with Kisha Bosch MD   [facility] Ophthalmology ([facility])    [address]  4th [address]  [place] [address] [phone]              Who to contact     Please call your clinic at [phone] to:    Ask questions about your health    Make or cancel appointments    Discuss your medicines    Learn about your test results    Speak to your doctor   If you have compliments or concerns about an experience at your clinic, or if you wish to file a complaint, please contact [facility] Patient Relations at [phone] or email us at [email or web address]         Additional Information About Your Visit        MyChart Information     i[employer] gives you secure access to your electronic health record. If you see a primary care provider, you can also send messages to your care team and make appointments. If you have questions, please call your primary care clinic.  If you do not have a primary care provider, please call [phone] and they will assist you.      [employer] is an electronic gateway that provides easy, online access to your medical records. With  Eli, you can request a clinic appointment, read your test results, renew a prescription or communicate with your care team.     To access your existing account, please contact your [facility] or call [phone] for assistance.        Care EveryWhere ID     This is your Care EveryWhere ID. This could be used by other organizations to access your [place] medical records  [id number]        Your Vitals Were     Pulse Last Period                73 07/03/2017 (Approximate)           Blood Pressure from Last 3 Encounters:   07/25/17 98/51   07/25/17 97/68   07/17/17 109/73    Weight from Last 3 Encounters:   07/25/17 94.8 kg (209 lb)   07/17/17 94.8 kg (209 lb)   07/12/17 94.8 kg (209 lb)              We Performed the Following     MOHS HEAD/NCK/HND/FT/GEN 1ST STAGE UP T0 5 BLOCKS     MOHS HEAD/NCK/HND/FT/GEN EA ADDTL STAGE UP T0 5 BLOCKS          Today's Medication Changes          These changes are accurate as of: 7/25/17 11:59 PM.  If you have any questions, ask your nurse or doctor.               These medicines have changed or have updated prescriptions.        Dose/Directions    * VALIUM PO   This may have changed:  Another medication with the same name was added. Make sure you understand how and when to take each.        Refills:  0       * diazepam 10 MG tablet   Commonly known as:  VALIUM   This may have changed:  You were already taking a medication with the same name, and this prescription was added. Make sure you understand how and when to take each.   Used for:  Anxiety   Changed by:  Halley Granados MD        Dose:  10 mg   Take 1 tablet (10 mg) by mouth once for 1 dose Take 30-60 minutes before procedure.  Do not operate a vehicle after taking this medication.   Quantity:  1 tablet   Refills:  0       * Notice:  This list has 2 medication(s) that are the same as other medications prescribed for you. Read the directions carefully, and ask your doctor or other  care provider to review them with you.         Where to get your medicines      Some of these will need a paper prescription and others can be bought over the counter.  Ask your nurse if you have questions.     Bring a paper prescription for each of these medications     diazepam 10 MG tablet                Primary Care Provider Office Phone # Fax #    Anna Naidu [phone] [address]  [facility] [address]        Equal Access to Services     BRIGETTE QUARLES : Hadii aad ku hadasho Soomaali, waaxda luqadaha, qaybta kaalmada adeegyada, waxay anyain mylesn adeeg sharon laJorgecolin . So [facility] [phone].    ATENCIÓN: Si jessicala heidi, tiene a mcdonnell disposición servicios gratuitos de asistencia lingüística. Llame al [phone].    We comply with applicable federal civil rights laws and [place] laws. We do not discriminate on the basis of race, color, national origin, age, disability sex, sexual orientation or gender identity.            Thank you!     Thank you for choosing [facility] DERMATOLOGIC SURGERY  for your care. Our goal is always to provide you with excellent care. Hearing back from our patients is one way we can continue to improve our services. Please take a few minutes to complete the written survey that you may receive in the mail after your visit with us. Thank you!             Your Updated Medication List - Protect others around you: Learn how to safely use, store and throw away your medicines at www.disposemymeds.org.          This list is accurate as of: 7/25/17 11:59 PM.  Always use your most recent med list.                   Brand Name Dispense Instructions for use Diagnosis    aspirin 81 MG EC tablet      Take 81 mg by mouth daily        buPROPion 150 MG 24 hr tablet    WELLBUTRIN XL    30 tablet    Take 1 tablet (150 mg) by mouth every morning    Mild major depression (H)       erythromycin ophthalmic ointment    ROMYCIN    3.5 g    Apply small amount to incision sites  three times daily until tube runs out.    Postoperative eye state       FLUoxetine 20 MG capsule    PROzac    270 capsule    Take 3 capsules (60 mg) by mouth daily    Major depressive disorder, single episode, mild (H)       fluticasone 50 MCG/ACT spray    FLONASE    1 g    Spray 2 sprays into both nostrils daily    Seasonal allergic rhinitis       gabapentin 300 MG capsule    NEURONTIN    270 capsule    Take 3 tablets (900 mg) every night.    Acute non intractable tension-type headache       HYDROcodone-acetaminophen 5-325 MG per tablet    NORCO    10 tablet    Take 1 tablet by mouth every 6 hours as needed for pain Maximum of 4000 mg of acetaminophen in 24 hours.    Postoperative eye state       LORazepam 1 MG tablet    ATIVAN    30 tablet    Take 1 tablet (1 mg) by mouth every 8 hours as needed for anxiety    Anxiety, Acute intractable tension-type headache       methocarbamol 750 MG tablet    ROBAXIN    30 tablet    Take 1 tablet (750 mg) by mouth 3 times daily as needed for other (Back tightness)        neomycin-polymyxin-dexamethasone 3.5-19684-0.1 Susp ophthalmic susp    MAXITROL    1 Bottle    Place 1 drop Into the left eye 4 times daily    Postoperative eye state       topiramate 25 MG tablet    TOPAMAX    180 tablet    Take 100mg at bedtime for 2 weeks, then 125mg  At hs    Intractable chronic migraine without aura and without status migrainosus       TYLENOL PO      Take 1,000 mg by mouth every 6 hours as needed for mild pain or fever        * VALIUM PO           * diazepam 10 MG tablet    VALIUM    1 tablet    Take 1 tablet (10 mg) by mouth once for 1 dose Take 30-60 minutes before procedure.  Do not operate a vehicle after taking this medication.    Anxiety       * Notice:  This list has 2 medication(s) that are the same as other medications prescribed for you. Read the directions carefully, and ask your doctor or other care provider to review them with you.

## 2017-08-08 ENCOUNTER — OFFICE VISIT (OUTPATIENT)
Dept: OPHTHALMOLOGY | Facility: CLINIC | Age: 42
End: 2017-08-08

## 2017-08-08 DIAGNOSIS — C44.91 BASAL CELL CARCINOMA: Primary | ICD-10-CM

## 2017-08-08 ASSESSMENT — VISUAL ACUITY
OD_CC: 20/20
OS_CC: 20/30
METHOD: SNELLEN - LINEAR
OD_CC+: -
CORRECTION_TYPE: GLASSES

## 2017-08-08 ASSESSMENT — TONOMETRY
OS_IOP_MMHG: 8
IOP_METHOD: ICARE
OD_IOP_MMHG: 15

## 2017-08-08 ASSESSMENT — CONF VISUAL FIELD
OS_NORMAL: 1
OD_NORMAL: 1

## 2017-08-08 NOTE — MR AVS SNAPSHOT
After Visit Summary   8/8/2017    Doretha Fernandez    MRN: [id number]           Patient Information     Date Of Birth          1975        Visit Information        Provider Department      8/8/2017 10:30 AM Kisha Bosch MD [facility] Ophthalmology        Today's Diagnoses     Basal cell carcinoma    -  1      Care Instructions     Massage along the incision 2-3 x daily with ointment, when Erythromycin runs out, switch to Vaseline or Aquaphore  * Warm soaks 3-4x daily until all edema and ecchymoses resolve             Follow-ups after your visit        Your next 10 appointments already scheduled     Aug 24, 2017 10:00 AM CDT   Return Visit with Bairon Miranda MD   [facility] ([facility])    [address]  [place] [address] [phone]            Sep 26, 2017 12:15 PM CDT   (Arrive by 12:00 PM)   RETURN PLASTICS with Kisha Bosch MD   [facility] Op[facility]mology ([facility])    [address] [place] [address] [phone]              Who to contact     Please call your clinic at [phone] to:    Ask questions about your health    Make or cancel appointments    Discuss your medicines    Learn about your test results    Speak to your doctor   If you have compliments or concerns about an experience at your clinic, or if you wish to file a complaint, please contact [facility] Patient Relations at [phone] or email us at [email or web address]         Additional Information About Your Visit        MyChart Information     [employer]edut gives you secure access to your electronic health record. If you see a primary care provider, you can also send messages to your care team and make appointments. If you have questions, please call your primary care clinic.  If you do not have a primary care provider, please call [phone]  and they will assist you.      [email or web address] is an electronic gateway that provides easy, online access to your medical records. With [email or web address], you can request a clinic appointment, read your test results, renew a prescription or communicate with your care team.     To access your existing account, please contact your [facility] or call [phone] for assistance.        Care EveryWhere ID     This is your Care EveryWhere ID. This could be used by other organizations to access your [place] medical records  [id number]        Your Vitals Were     Last Period                   07/03/2017 (Approximate)            Blood Pressure from Last 3 Encounters:   07/25/17 98/51   07/25/17 97/68   07/17/17 109/73    Weight from Last 3 Encounters:   07/25/17 94.8 kg (209 lb)   07/17/17 94.8 kg (209 lb)   07/12/17 94.8 kg (209 lb)              Today, you had the following     No orders found for display       Primary Care Provider Office Phone # Fax #    Anna Naidu [phone]       [facility] [address] [facility] [address]        Equal Access to Services     [facility] : Hadii aad ku hadasho Soomaali, waaxda luqadaha, qaybta kaalmada adeegyada, pancho queen haycolin moore . So [facility] [phone].    ATENCIÓN: Si habla español, tiene a mcdonnell disposición servicios gratuitos de asistencia lingüística. Llame al [phone].    We comply with applicable federal civil rights laws and [place] laws. We do not discriminate on the basis of race, color, national origin, age, disability sex, sexual orientation or gender identity.            Thank you!     Thank you for choosing [facility]  for your care. Our goal is always to provide you with excellent care. Hearing back from our patients is one way we can continue to improve our services. Please take a few minutes to complete the written survey that you may receive in the mail after your visit with us.  Thank you!             Your Updated Medication List - Protect others around you: Learn how to safely use, store and throw away your medicines at www.disposemymeds.org.          This list is accurate as of: 8/8/17 11:03 AM.  Always use your most recent med list.                   Brand Name Dispense Instructions for use Diagnosis    aspirin 81 MG EC tablet      Take 81 mg by mouth daily        buPROPion 150 MG 24 hr tablet    WELLBUTRIN XL    30 tablet    Take 1 tablet (150 mg) by mouth every morning    Mild major depression (H)       erythromycin ophthalmic ointment    ROMYCIN    3.5 g    Apply small amount to incision sites three times daily until tube runs out.    Postoperative eye state       FLUoxetine 20 MG capsule    PROzac    270 capsule    Take 3 capsules (60 mg) by mouth daily    Major depressive disorder, single episode, mild (H)       fluticasone 50 MCG/ACT spray    FLONASE    1 g    Spray 2 sprays into both nostrils daily    Seasonal allergic rhinitis       gabapentin 300 MG capsule    NEURONTIN    270 capsule    Take 3 tablets (900 mg) every night.    Acute non intractable tension-type headache       HYDROcodone-acetaminophen 5-325 MG per tablet    NORCO    10 tablet    Take 1 tablet by mouth every 6 hours as needed for pain Maximum of 4000 mg of acetaminophen in 24 hours.    Postoperative eye state       LORazepam 1 MG tablet    ATIVAN    30 tablet    Take 1 tablet (1 mg) by mouth every 8 hours as needed for anxiety    Anxiety, Acute intractable tension-type headache       methocarbamol 750 MG tablet    ROBAXIN    30 tablet    Take 1 tablet (750 mg) by mouth 3 times daily as needed for other (Back tightness)        neomycin-polymyxin-dexamethasone 3.5-15417-9.1 Susp ophthalmic susp    MAXITROL    1 Bottle    Place 1 drop Into the left eye 4 times daily    Postoperative eye state       topiramate 25 MG tablet    TOPAMAX    180 tablet    Take 100mg at bedtime for 2 weeks, then 125mg  At hs    Intractable  chronic migraine without aura and without status migrainosus       TYLENOL PO      Take 1,000 mg by mouth every 6 hours as needed for mild pain or fever        VALIUM PO

## 2017-08-08 NOTE — LETTER
August 8, 2017      Re: Doretha Fernandez   1975    To Whom It May Concern:    This is to confirm that the above patient was seen on 8/8/2017.  Doretha Fernandez is able to return to work Thursday, 8/10/17 without restrictions.  She has her next post op appointment 9/26/2017.    Thank you for your cooperation in this matter.  Please do not hesitate to contact me if you have any further questions.    Sincerely,      HARI ANDUJAR

## 2017-08-08 NOTE — PROGRESS NOTES
Doretha Fernandez is status post left upper lid mohs repair - wedge for bcc.  Incision(s) healing well.  The lid(s)  is  in excellent alignment, slightly ptotic likely from swelling.    I have recommended:  * Continue antibiotic ointment or bland lubricating ointment (eg vaseline or aquaphor) to the incision site BID.  * Massage along the incision 2-3 x daily.   * Warm soaks 3-4x daily until all edema and ecchymoses resolve  * Return to clinic in 2 months     Attending Physician Attestation:  Complete documentation of historical and exam elements from today's encounter can be found in the full encounter summary report (not reduplicated in this progress note).  I personally obtained the chief complaint(s) and history of present illness.  I confirmed and edited as necessary the review of systems, past medical/surgical history, family history, social history, and examination findings as documented by others; and I examined the patient myself.  I personally reviewed the relevant tests, images, and reports as documented above.  I formulated and edited as necessary the assessment and plan and discussed the findings and management plan with the patient and family. - Kisha Bosch MD

## 2017-08-08 NOTE — PATIENT INSTRUCTIONS
Massage along the incision 2-3 x daily with ointment, when Erythromycin runs out, switch to Vaseline or Aquaphore  * Warm soaks 3-4x daily until all edema and ecchymoses resolve

## 2017-08-24 ENCOUNTER — OFFICE VISIT (OUTPATIENT)
Dept: NEUROLOGY | Facility: CLINIC | Age: 42
End: 2017-08-24

## 2017-08-24 VITALS — HEART RATE: 72 BPM | SYSTOLIC BLOOD PRESSURE: 111 MMHG | DIASTOLIC BLOOD PRESSURE: 81 MMHG | RESPIRATION RATE: 18 BRPM

## 2017-08-24 DIAGNOSIS — G43.111 INTRACTABLE MIGRAINE WITH AURA WITH STATUS MIGRAINOSUS: ICD-10-CM

## 2017-08-24 DIAGNOSIS — H53.9 VISION CHANGES: Primary | ICD-10-CM

## 2017-08-24 DIAGNOSIS — G44.209 ACUTE NON INTRACTABLE TENSION-TYPE HEADACHE: ICD-10-CM

## 2017-08-24 RX ORDER — TOPIRAMATE 50 MG/1
50 TABLET, FILM COATED ORAL 3 TIMES DAILY
Qty: 270 TABLET | Refills: 3 | Status: SHIPPED | OUTPATIENT
Start: 2017-08-24 | End: 2017-08-25

## 2017-08-24 RX ORDER — GABAPENTIN 300 MG/1
1200 CAPSULE ORAL AT BEDTIME
Qty: 360 CAPSULE | Refills: 1 | Status: SHIPPED | OUTPATIENT
Start: 2017-08-24 | End: 2017-10-12

## 2017-08-24 ASSESSMENT — PAIN SCALES - GENERAL: PAINLEVEL: SEVERE PAIN (7)

## 2017-08-24 NOTE — LETTER
2017       RE: Doretha Fernandez  [address] [place] [address]     Dear Colleague,    Thank you for referring your patient, Doretha Fernandez, to the [facility] NEUROLOG[facility]IC at [facility]. Please see a copy of my visit note below.    2017      Anna Naidu MD   [facility]   [address] [place]  [address]      RE: Doretha Fernandez   MRN: [id number]   : 1975      Dear Dr. Naidu:      This is in regard to followup on Doretha Fernandez.  The patient returned today on 2017.  Her chief complaint is that of headache.      The patient said that her headache has still continued.  She is up to 125 mg of topiramate.  She also is taking 900 mg of gabapentin and she denied any new depression.  She is not suicidal.  She has some situational depression over her medical problems.  The patient has had no trouble driving her car.  She denied any weight gain or edema.  She said she is somewhat tired.  She is not certain if it is from her medicines or not.  She has markedly cut back on her use of caffeine, but she is still drinking 1-1/2 cups of caffeinated coffee per day.  I did remind her some authorities recommend no caffeine and some up to 6 ounces of caffeinated coffee per day.  She said she also has been using some tea and I told her she probably should not do that.  She has had no problems expressing herself.  She has not had weight loss on topiramate that she is aware of.  She denied any paresthesias.  The patient said that she is drinking at least 6 glasses of fluid per day.  She did wonder whether Wellbutrin she has been given has been helpful in terms of her depression.  She evidently had successful left eyelid Mohs surgery on 2017.  She still feels that her vision in her left eye is not what it should be and I did suggest she go back to Dr. Norton, the  neuro-ophthalmologist that she saw.      Neurologic examination was basically unremarkable.  Her blood pressure is 111/81, pulse of 72.  She had normal reflexes, strength, cerebellar testing, as well as cranial nerve examination.  She had normal eyegrounds with no signs of papilledema.  I could not auscultate cervical bruits.  She had a regular cardiac rhythm.  She had a supple neck.      IMPRESSION:  Chronic migraine.      I have reviewed with the patient medicines that could be tried and adjustment of the current medicines.  I did recommend that she increase her topiramate up to possibly 200 mg a day.  She will do this gradually.  I did tell her she would be better off if she started an exercise program.  I also reviewed with her the use of melatonin.  She may want to increase her dose of gabapentin.  The patient did review with me also the Cefaly machine.  I have asked that she have followup in approximately 2 months and on a p.r.n. basis.  I did go over with her again literature that suggests that chronic analgesic rebound may not improve for over 2 months once cessation of caffeine and excessive analgesics are stopped.  I also reviewed the use of medications and not to change them immediately and to consider using them for at least 2 months before abandoning them for prophylaxis.      I spent 25 minutes with the patient today.  Over 50% of the time did involve counseling and coordination of care.      Thank you for allowing me to see this patient.      Sincerely yours,           CRISTINA EDUARDO MD          D: 2017 10:23   T: 2017 12:15   MT: KEISHA      Name:     ELIZABETH MOREIRA   MRN:      [id number]        Account:      [id number]   :      1975           Service Date: 2017      Document: [id number]

## 2017-08-24 NOTE — MR AVS SNAPSHOT
After Visit Summary   8/24/2017    Doretha Fernandez    MRN: [id number]           Patient Information     Date Of Birth          1975        Visit Information        Provider Department      8/24/2017 10:00 AM Bairon Miranda MD [facility]        Today's Diagnoses     Vision changes    -  1    Intractable migraine with aura with status migrainosus           Follow-ups after your visit        Additional Services     Ophthalmology (Eye) Referral ([facility])       Please call [phone] to schedule an appointment with Dr. Lowell Mireles or John    Eye (Ophthalmology) Clinic  [facility]  [facility]  [address] [facility] [address]  [address] [place] [address]                  Follow-up notes from your care team     Return in about 5 weeks (around 9/27/2017).      Your next 10 appointments already scheduled     Sep 26, 2017 12:15 PM CDT   (Arrive by 12:00 PM)   RETURN PLASTICS with Kisha Bosch MD   [facility] Ophthalmology ([facility])    [address]  [address] [facility] [address] [phone]            Sep 28, 2017  9:30 AM CDT   RETURN NEURO with Cheo Norton MD   Eye Clinic ([facility])    [facility]  [address]  [address]  [facility] [id number] [phone]            Sep 28, 2017 11:30 AM CDT   Return Visit with Bairon Miranda MD   [facility] ([facility])    [address], [address]  [place] [address] [phone]              Future tests that were ordered for you today     Open Future Orders        Priority Expected Expires Ordered    Ophthalmology (Eye) Referral ([facility]) Routine 8/24/2017 8/24/2018 8/24/2017            Who to contact     Please call your clinic at [phone] to:    Ask questions about  your health    Make or cancel appointments    Discuss your medicines    Learn about your test results    Speak to your doctor   If you have compliments or concerns about an experience at your clinic, or if you wish to file a complaint, please contact [facility] Patient Relations at [phone] or email us at [email or web address]         Additional Information About Your Visit        MyChart Information     [employer] gives you secure access to your electronic health record. If you see a primary care provider, you can also send messages to your care team and make appointments. If you have questions, please call your primary care clinic.  If you do not have a primary care provider, please call [phone] and they will assist you.      [employer] is an electronic gateway that provides easy, online access to your medical records. With [employer], you can request a clinic appointment, read your test results, renew a prescription or communicate with your care team.     To access your existing account, please contact your [facility] or call [phone] for assistance.        Care EveryWhere ID     This is your Care EveryWhere ID. This could be used by other organizations to access your [place] medical records  [id number]        Your Vitals Were     Pulse Respirations                72 18           Blood Pressure from Last 3 Encounters:   08/24/17 111/81   07/25/17 98/51   07/25/17 97/68    Weight from Last 3 Encounters:   07/25/17 209 lb (94.8 kg)   07/17/17 209 lb (94.8 kg)   07/12/17 209 lb (94.8 kg)                 Today's Medication Changes          These changes are accurate as of: 8/24/17 11:49 AM.  If you have any questions, ask your nurse or doctor.               These medicines have changed or have updated prescriptions.        Dose/Directions    * topiramate 25 MG tablet   Commonly known as:  TOPAMAX   This may have changed:  Another medication  with the same name was added. Make sure you understand how and when to take each.   Used for:  Intractable chronic migraine without aura and without status migrainosus   Changed by:  Bairon Miranda MD        Take 100mg at bedtime for 2 weeks, then 125mg  At hs   Quantity:  180 tablet   Refills:  3       * topiramate 50 MG tablet   Commonly known as:  TOPAMAX   This may have changed:  You were already taking a medication with the same name, and this prescription was added. Make sure you understand how and when to take each.   Used for:  Vision changes, Intractable migraine with aura with status migrainosus   Changed by:  Bairon Miranda MD        Dose:  50 mg   Take 1 tablet (50 mg) by mouth 3 times daily Two tablets twice daily   Quantity:  270 tablet   Refills:  3       * Notice:  This list has 2 medication(s) that are the same as other medications prescribed for you. Read the directions carefully, and ask your doctor or other care provider to review them with you.         Where to get your medicines      These medications were sent to [facility] [address]  [address] [facility] [address]     Phone:  [phone]     topiramate 50 MG tablet                Primary Care Provider Office Phone # Fax #    Anna Naidu [phone] [address] [facility] [address]        Equal Access to Services     [facility]: Hadii josy garnett hadasho Soomaali, waaxda luqadaha, qaybta kaalmada adeegyada, pancho queen haycolin moore . So [facility] [phone].    ATENCIÓN: Si habla español, tiene a mcdonnell disposición servicios gratuitos de asistencia lingüística. Llame al [phone].    We comply with applicable federal civil rights laws and [place] laws. We do not discriminate on the basis of race, color, national origin, age, disability sex, sexual orientation or gender identity.            Thank you!      Thank you for choosing [facility]  for your care. Our goal is always to provide you with excellent care. Hearing back from our patients is one way we can continue to improve our services. Please take a few minutes to complete the written survey that you may receive in the mail after your visit with us. Thank you!             Your Updated Medication List - Protect others around you: Learn how to safely use, store and throw away your medicines at www.disposemymeds.org.          This list is accurate as of: 8/24/17 11:49 AM.  Always use your most recent med list.                   Brand Name Dispense Instructions for use Diagnosis    aspirin 81 MG EC tablet      Take 81 mg by mouth daily        buPROPion 150 MG 24 hr tablet    WELLBUTRIN XL    30 tablet    Take 1 tablet (150 mg) by mouth every morning    Mild major depression (H)       erythromycin ophthalmic ointment    ROMYCIN    3.5 g    Apply small amount to incision sites three times daily until tube runs out.    Postoperative eye state       FLUoxetine 20 MG capsule    PROzac    270 capsule    Take 3 capsules (60 mg) by mouth daily    Major depressive disorder, single episode, mild (H)       fluticasone 50 MCG/ACT spray    FLONASE    1 g    Spray 2 sprays into both nostrils daily    Seasonal allergic rhinitis       gabapentin 300 MG capsule    NEURONTIN    270 capsule    Take 3 tablets (900 mg) every night.    Acute non intractable tension-type headache       HYDROcodone-acetaminophen 5-325 MG per tablet    NORCO    10 tablet    Take 1 tablet by mouth every 6 hours as needed for pain Maximum of 4000 mg of acetaminophen in 24 hours.    Postoperative eye state       LORazepam 1 MG tablet    ATIVAN    30 tablet    Take 1 tablet (1 mg) by mouth every 8 hours as needed for anxiety    Anxiety, Acute intractable tension-type headache       methocarbamol 750 MG tablet    ROBAXIN    30 tablet    Take 1 tablet (750 mg) by  mouth 3 times daily as needed for other (Back tightness)        neomycin-polymyxin-dexamethasone 3.5-05940-5.1 Susp ophthalmic susp    MAXITROL    1 Bottle    Place 1 drop Into the left eye 4 times daily    Postoperative eye state       * topiramate 25 MG tablet    TOPAMAX    180 tablet    Take 100mg at bedtime for 2 weeks, then 125mg  At hs    Intractable chronic migraine without aura and without status migrainosus       * topiramate 50 MG tablet    TOPAMAX    270 tablet    Take 1 tablet (50 mg) by mouth 3 times daily Two tablets twice daily    Vision changes, Intractable migraine with aura with status migrainosus       TYLENOL PO      Take 1,000 mg by mouth every 6 hours as needed for mild pain or fever        VALIUM PO           * Notice:  This list has 2 medication(s) that are the same as other medications prescribed for you. Read the directions carefully, and ask your doctor or other care provider to review them with you.

## 2017-08-25 DIAGNOSIS — G43.111 INTRACTABLE MIGRAINE WITH AURA WITH STATUS MIGRAINOSUS: ICD-10-CM

## 2017-08-25 DIAGNOSIS — H53.9 VISION CHANGES: ICD-10-CM

## 2017-08-25 RX ORDER — TOPIRAMATE 50 MG/1
50 TABLET, FILM COATED ORAL 3 TIMES DAILY
Qty: 270 TABLET | Refills: 3 | Status: SHIPPED | OUTPATIENT
Start: 2017-08-25 | End: 2017-10-12

## 2017-09-06 ENCOUNTER — OFFICE VISIT (OUTPATIENT)
Dept: FAMILY MEDICINE | Facility: CLINIC | Age: 42
End: 2017-09-06
Payer: COMMERCIAL

## 2017-09-06 VITALS
WEIGHT: 208.8 LBS | SYSTOLIC BLOOD PRESSURE: 107 MMHG | BODY MASS INDEX: 36.99 KG/M2 | HEART RATE: 84 BPM | DIASTOLIC BLOOD PRESSURE: 76 MMHG | TEMPERATURE: 99 F

## 2017-09-06 DIAGNOSIS — F32.0 MILD MAJOR DEPRESSION (H): ICD-10-CM

## 2017-09-06 DIAGNOSIS — S20.112A: ICD-10-CM

## 2017-09-06 DIAGNOSIS — R59.9 LYMPH NODE ENLARGEMENT: Primary | ICD-10-CM

## 2017-09-06 DIAGNOSIS — F41.1 ANXIETY STATE: ICD-10-CM

## 2017-09-06 DIAGNOSIS — F32.0 MAJOR DEPRESSIVE DISORDER, SINGLE EPISODE, MILD (H): ICD-10-CM

## 2017-09-06 PROCEDURE — 99214 OFFICE O/P EST MOD 30 MIN: CPT | Performed by: FAMILY MEDICINE

## 2017-09-06 NOTE — MR AVS SNAPSHOT
After Visit Summary   9/6/2017    Doretha Fernandez    MRN: [id number]           Patient Information     Date Of Birth          1975        Visit Information        Provider Department      9/6/2017 9:00 AM Anna Naidu MD [facility]        Today's Diagnoses     Lymph node enlargement    -  1    Abrasion of breast, left, initial encounter        Anxiety state        Mild major depression (H)          Care Instructions    Reassured. Went over the nature of lymphadenopathy and the warning signs including continued growth. Will follow expectantly for now.  Recheck if not improving as expected and in 1 month unless resolving.    See Psychiatry - Erendira Rene NP or Dr. Doug Naidu M.D.          Thank you for choosing [facility].  You may be receiving a survey in the mail from [employer] regarding your visit today.  Please take a few minutes to complete and return the survey to let us know how we are doing.      Our Clinic hours are:  Mondays    7:20 am - 7 pm  Tues -  Fri  7:20 am - 5 pm    Clinic Phone: [phone]    The clinic lab opens at 7:30 am Mon - Fri and appointments are required.    [facility]. [phone]  Monday-Thursday 8 am - 7pm  Tues/Wed/Fri 8 am - 5:30 pm                 Follow-ups after your visit        Additional Services     MENTAL HEALTH REFERRAL       Your provider has referred you to: [facility]: [facility] - [facility] Primary [facility] [place] [phone]   [email or web address]   *Referral from [facility] Primary Care Provider required - Consultation Model - medication management & future refills will be returned to [facility] PCP upon completion of evaluation  *Please call to schedule an appointment.    [facility]: [facility] - [facility]  [phone]    [email or web address]   *Referral from [facility] Primary Care Provider required - Consultation Model - medication management & future refills will be returned to [facility] PCP upon completion of evaluation  *Please call to schedule an appointment.    All scheduling is subject to the client's specific insurance plan & benefits, provider/location availability, and provider clinical specialities.  Please arrive 15 minutes early for your first appointment and bring your completed paperwork.    Please be aware that coverage of these services is subject to the terms and limitations of your health insurance plan.  Call member services at your health plan with any benefit or coverage questions.                  Your next 10 appointments already scheduled     Sep 26, 2017 12:15 PM CDT   (Arrive by 12:00 PM)   RETURN PLASTICS with Kisha Bosch MD   [facility] Ophthalmology ([facility])    [address]  4th [facility] [address]   [phone]            Sep 28, 2017  9:30 AM CDT   RETURN NEURO with Cheo Norton MD   Eye Clinic ([facility])    [address] [facility] [facility]  [address] 9a  [facility] [address]   [phone]            Sep 28, 2017 11:30 AM CDT   Return Visit with Bairon Miranda MD   [facility] Neurology Clinic ([facility])    [address]  [facility] [address] [phone]              Who to contact     If you have questions or need follow up information about today's clinic visit or your schedule please contact [facility] directly at [phone].  Normal or non-critical lab and imaging results will be communicated to you by MyChart, letter or phone within 4 business days after the clinic has received the results. If you do not hear from us within 7 days, please contact the clinic through MyChart or  phone. If you have a critical or abnormal lab result, we will notify you by phone as soon as possible.  Submit refill requests through [facility] or call your pharmacy and they will forward the refill request to us. Please allow 3 business days for your refill to be completed.          Additional Information About Your Visit        [employer] Information     [facility] gives you secure access to your electronic health record. If you see a primary care provider, you can also send messages to your care team and make appointments. If you have questions, please call your primary care clinic.  If you do not have a primary care provider, please call [phone] and they will assist you.        Care EveryWhere ID     This is your Care EveryWhere ID. This could be used by other organizations to access your [place] medical records  [id number]        Your Vitals Were     Pulse Temperature BMI (Body Mass Index)             84 99  F (37.2  C) (Tympanic) 36.99 kg/m2          Blood Pressure from Last 3 Encounters:   09/06/17 107/76   08/24/17 111/81   07/25/17 98/51    Weight from Last 3 Encounters:   09/06/17 208 lb 12.8 oz (94.7 kg)   07/25/17 209 lb (94.8 kg)   07/17/17 209 lb (94.8 kg)              We Performed the Following     MENTAL HEALTH REFERRAL          Today's Medication Changes          These changes are accurate as of: 9/6/17  9:28 AM.  If you have any questions, ask your nurse or doctor.               Stop taking these medicines if you haven't already. Please contact your care team if you have questions.     HYDROcodone-acetaminophen 5-325 MG per tablet   Commonly known as:  NORCO   Stopped by:  Anna Naidu MD                    Primary Care Provider Office Phone # Fax #    Anna Naidu [phone] [address] [facility] [address]        Equal Access to Services     BRIGETTE QUARLES : Nicol Gustafson, melba valencia, pancho tate  sharon boxaabeth ah. So [facility] [phone].    ATENCIÓN: Si kyara gordon, tiene a mcdonnell disposición servicios gratuitos de asistencia lingüística. Diana cole [phone].    We comply with applicable federal civil rights laws and [place] laws. We do not discriminate on the basis of race, color, national origin, age, disability sex, sexual orientation or gender identity.            Thank you!     Thank you for choosing [facility]  for your care. Our goal is always to provide you with excellent care. Hearing back from our patients is one way we can continue to improve our services. Please take a few minutes to complete the written survey that you may receive in the mail after your visit with us. Thank you!             Your Updated Medication List - Protect others around you: Learn how to safely use, store and throw away your medicines at www.disposemymeds.org.          This list is accurate as of: 9/6/17  9:28 AM.  Always use your most recent med list.                   Brand Name Dispense Instructions for use Diagnosis    aspirin 81 MG EC tablet      Take 81 mg by mouth daily        buPROPion 150 MG 24 hr tablet    WELLBUTRIN XL    30 tablet    Take 1 tablet (150 mg) by mouth every morning    Mild major depression (H)       erythromycin ophthalmic ointment    ROMYCIN    3.5 g    Apply small amount to incision sites three times daily until tube runs out.    Postoperative eye state       FLUoxetine 20 MG capsule    PROzac    270 capsule    Take 3 capsules (60 mg) by mouth daily    Major depressive disorder, single episode, mild (H)       fluticasone 50 MCG/ACT spray    FLONASE    1 g    Spray 2 sprays into both nostrils daily    Seasonal allergic rhinitis       gabapentin 300 MG capsule    NEURONTIN    360 capsule    Take 4 capsules (1,200 mg) by mouth At Bedtime    Acute non intractable tension-type headache       LORazepam 1 MG tablet    ATIVAN    30 tablet    Take 1 tablet (1 mg) by mouth every 8 hours  as needed for anxiety    Anxiety, Acute intractable tension-type headache       methocarbamol 750 MG tablet    ROBAXIN    30 tablet    Take 1 tablet (750 mg) by mouth 3 times daily as needed for other (Back tightness)        neomycin-polymyxin-dexamethasone 3.5-58913-0.1 Susp ophthalmic susp    MAXITROL    1 Bottle    Place 1 drop Into the left eye 4 times daily    Postoperative eye state       * topiramate 25 MG tablet    TOPAMAX    180 tablet    Take 100mg at bedtime for 2 weeks, then 125mg  At hs    Intractable chronic migraine without aura and without status migrainosus       * topiramate 50 MG tablet    TOPAMAX    270 tablet    Take 1 tablet (50 mg) by mouth 3 times daily    Vision changes, Intractable migraine with aura with status migrainosus       TYLENOL PO      Take 1,000 mg by mouth every 6 hours as needed for mild pain or fever        VALIUM PO           * Notice:  This list has 2 medication(s) that are the same as other medications prescribed for you. Read the directions carefully, and ask your doctor or other care provider to review them with you.

## 2017-09-06 NOTE — PROGRESS NOTES
"  SUBJECTIVE:   Doretha Fernandez is a 42 year old female who presents to clinic today for the following health issues:      Painful lump in left Axilla   Noticed 9/5/17.  Pain with pressure - noted yesterday and has been \"playing with it\" as well as googling her symptoms all night.  With her uncontrolled anxiety she's a \"mess\" this morning with worry.     She denies unintentional weight loss, night sweats, etc.    Has a scratch on her left breast that she noticed over the weekend.  She complains that her \"entire left side\" doesn't feel good.  Unsure if she has a sinus infection or just more eye drainage and pain since the upper lid surgery.  No fever/chills.    Depression and Anxiety Follow-Up    Status since last visit: Worsened - no improvement with the addition of wellbutrin recently and is not liking that she can't have \"a drink\" - we discussed that this wasn't an absolute contraindication.  She would like to see Psych and \"have     Other associated symptoms:None    Complicating factors:     Significant life event: Yes-  Recent health problems, eye surgery for basal cell carcinoma      Current substance abuse: None    PHQ-9 SCORE 9/16/2016 3/27/2017 7/12/2017   Total Score - - -   Total Score 12 2 20     SANDRA-7 SCORE 9/16/2015 1/7/2016 7/12/2017   Total Score - - -   Total Score 19 10 21       PHQ-9  English  PHQ-9   Any Language  GAD7      /76  Pulse 84  Temp 99  F (37.2  C) (Tympanic)  Wt 208 lb 12.8 oz (94.7 kg)  BMI 36.99 kg/m2  EXAM: GENERAL APPEARANCE: Alert, no acute distress  EYES: PERRL, EOM normal, conjunctiva and lids normal, well healing scar from the left upper lid surgery  HENT: Ears and TMs normal, oral mucosa and posterior oropharynx normal  RESP: lungs clear to auscultation   CV: normal rate, regular rhythm, no murmur or gallop  ABDOMEN: soft, no organomegaly, masses or tenderness  LYMPHATICS: anterior cervical: no adenopathy, posterior cervical: no adenopathy, supraclavicular: no " adenopathy, axillary: left axilla with a 2 cm smooth, mobile, non-tender node, inguinal: no adenopathy, No hepato-splenomegaly  SKIN: red scratch/abrasion left breast- no surrounding cellulitis    ASSESSMENT/PLAN:      ICD-10-CM    1. Lymph node enlargement R59.9    2. Abrasion of breast, left, initial encounter S20.112A    3. Anxiety state F41.1 MENTAL HEALTH REFERRAL   4. Mild major depression (H) F32.0 MENTAL HEALTH REFERRAL     At this time reassured her that the cause of the enlarged node is likely the abrasion of the left breast.  I don't feel this is due to her eye surgery and I'm not convinced of a sinusitis.  Would need facial CT to pursue that further over just doing an antibiotic.      If the node persists, would get US/biopsy of this.     Patient would like to see Psych for her anxiety/depression which is not well controlled. Referral made.     Anna Naidu M.D.      Patient Instructions   Reassured. Went over the nature of lymphadenopathy and the warning signs including continued growth. Will follow expectantly for now.  Recheck if not improving as expected and in 1 month unless resolving.    See Psychiatry - Erendira Rene NP or Dr. Doug Naidu M.D.          Thank you for choosing [facility].  You may be receiving a survey in the mail from [employer] regarding your visit today.  Please take a few minutes to complete and return the survey to let us know how we are doing.      Our Clinic hours are:  Mondays    7:20 am - 7 pm  Tues -  Fri  7:20 am - 5 pm    Clinic Phone: [phone]    The clinic lab opens at 7:30 am Mon - Fri and appointments are required.    [facility]. [phone]  Monday-Thursday 8 am - 7pm  Tues/Wed/Fri 8 am - 5:30 pm

## 2017-09-06 NOTE — PATIENT INSTRUCTIONS
Reassured. Went over the nature of lymphadenopathy and the warning signs including continued growth. Will follow expectantly for now.  Recheck if not improving as expected and in 1 month unless resolving.    See Psychiatry - Erendira Rene NP or Dr. Doug Naidu M.D.          Thank you for choosing [facility]  You may be receiving a survey in the mail from [facility] regarding your visit today.  Please take a few minutes to complete and return the survey to let us know how we are doing.      Our Clinic hours are:  Mondays    7:20 am - 7 pm  Tues -  Fri  7:20 am - 5 pm    Clinic Phone: [phone]    The clinic lab opens at 7:30 am Mon - Fri and appointments are required.    [facility]. [phone]  Monday-Thursday 8 am - 7pm  Tues/Wed/Fri 8 am - 5:30 pm

## 2017-09-06 NOTE — NURSING NOTE
"Initial /76  Pulse 84  Temp 99  F (37.2  C) (Tympanic)  Wt 208 lb 12.8 oz (94.7 kg)  BMI 36.99 kg/m2 Estimated body mass index is 36.99 kg/(m^2) as calculated from the following:    Height as of 7/25/17: 5' 3\" (1.6 m).    Weight as of this encounter: 208 lb 12.8 oz (94.7 kg). .      "

## 2017-09-06 NOTE — TELEPHONE ENCOUNTER
FLUoxetine (PROZAC) 20 MG capsule       Last Written Prescription Date: 6/15/17  Last Fill Quantity: 270; # refills: 1  Last Office Visit with [facility], [facility] or [facility] prescribing provider:  9/6/17        Last PHQ-9 score on record=   PHQ-9 SCORE 7/12/2017   Total Score -   Total Score 20       Lab Results   Component Value Date    AST 21 06/05/2017     Lab Results   Component Value Date    ALT 29 06/05/2017

## 2017-09-07 ASSESSMENT — ASTHMA QUESTIONNAIRES: ACT_TOTALSCORE: 22

## 2017-09-08 NOTE — PROGRESS NOTES
2017      Anna Naidu MD   [facility]   [address] [place]  [address]      RE: Doretha Fernandez   MRN: [id number]   : 1975      Dear Dr. Naidu:      This is in regard to followup on Doretha Fernandez.  The patient returned today on 2017.  Her chief complaint is that of headache.      The patient said that her headache has still continued.  She is up to 125 mg of topiramate.  She also is taking 900 mg of gabapentin and she denied any new depression.  She is not suicidal.  She has some situational depression over her medical problems.  The patient has had no trouble driving her car.  She denied any weight gain or edema.  She said she is somewhat tired.  She is not certain if it is from her medicines or not.  She has markedly cut back on her use of caffeine, but she is still drinking 1-1/2 cups of caffeinated coffee per day.  I did remind her some authorities recommend no caffeine and some up to 6 ounces of caffeinated coffee per day.  She said she also has been using some tea and I told her she probably should not do that.  She has had no problems expressing herself.  She has not had weight loss on topiramate that she is aware of.  She denied any paresthesias.  The patient said that she is drinking at least 6 glasses of fluid per day.  She did wonder whether Wellbutrin she has been given has been helpful in terms of her depression.  She evidently had successful left eyelid Mohs surgery on 2017.  She still feels that her vision in her left eye is not what it should be and I did suggest she go back to Dr. Norton, the neuro-ophthalmologist that she saw.      Neurologic examination was basically unremarkable.  Her blood pressure is 111/81, pulse of 72.  She had normal reflexes, strength, cerebellar testing, as well as cranial nerve examination.  She had normal eyegrounds with no signs of papilledema.  I could not auscultate cervical bruits.  She had a  regular cardiac rhythm.  She had a supple neck.      IMPRESSION:  Chronic migraine.      I have reviewed with the patient medicines that could be tried and adjustment of the current medicines.  I did recommend that she increase her topiramate up to possibly 200 mg a day.  She will do this gradually.  I did tell her she would be better off if she started an exercise program.  I also reviewed with her the use of melatonin.  She may want to increase her dose of gabapentin.  The patient did review with me also the Cefaly machine.  I have asked that she have followup in approximately 2 months and on a p.r.n. basis.  I did go over with her again literature that suggests that chronic analgesic rebound may not improve for over 2 months once cessation of caffeine and excessive analgesics are stopped.  I also reviewed the use of medications and not to change them immediately and to consider using them for at least 2 months before abandoning them for prophylaxis.      I spent 25 minutes with the patient today.  Over 50% of the time did involve counseling and coordination of care.      Thank you for allowing me to see this patient.      Sincerely yours,      MD CRISTINA Pierce MD             D: 2017 10:23   T: 2017 12:15   MT: KEISHA      Name:     ELIZABETH MOREIRA   MRN:      [id number]        Account:      [id number]   :      1975           Service Date: 2017      Document: [id number]

## 2017-09-12 ENCOUNTER — HOSPITAL ENCOUNTER (EMERGENCY)
Facility: CLINIC | Age: 42
Discharge: HOME | End: 2017-09-12
Attending: FAMILY MEDICINE | Admitting: FAMILY MEDICINE
Payer: COMMERCIAL

## 2017-09-12 ENCOUNTER — CARE COORDINATION (OUTPATIENT)
Dept: NEUROLOGY | Facility: CLINIC | Age: 42
End: 2017-09-12

## 2017-09-12 ENCOUNTER — APPOINTMENT (OUTPATIENT)
Dept: CT IMAGING | Facility: CLINIC | Age: 42
End: 2017-09-12
Attending: FAMILY MEDICINE
Payer: COMMERCIAL

## 2017-09-12 VITALS
OXYGEN SATURATION: 95 % | HEART RATE: 56 BPM | SYSTOLIC BLOOD PRESSURE: 96 MMHG | TEMPERATURE: 98 F | HEIGHT: 63 IN | BODY MASS INDEX: 36.86 KG/M2 | RESPIRATION RATE: 16 BRPM | DIASTOLIC BLOOD PRESSURE: 52 MMHG | WEIGHT: 208 LBS

## 2017-09-12 DIAGNOSIS — G44.209 ACUTE NON INTRACTABLE TENSION-TYPE HEADACHE: ICD-10-CM

## 2017-09-12 DIAGNOSIS — D68.52 PROTHROMBIN MUTATION (H): ICD-10-CM

## 2017-09-12 LAB
CRP SERPL-MCNC: 6.6 MG/L (ref 0–8)
ERYTHROCYTE [SEDIMENTATION RATE] IN BLOOD BY WESTERGREN METHOD: 14 MM/H (ref 0–20)

## 2017-09-12 PROCEDURE — 96374 THER/PROPH/DIAG INJ IV PUSH: CPT

## 2017-09-12 PROCEDURE — 99284 EMERGENCY DEPT VISIT MOD MDM: CPT | Mod: 25

## 2017-09-12 PROCEDURE — 99284 EMERGENCY DEPT VISIT MOD MDM: CPT | Performed by: FAMILY MEDICINE

## 2017-09-12 PROCEDURE — 96361 HYDRATE IV INFUSION ADD-ON: CPT | Mod: 59

## 2017-09-12 PROCEDURE — 86140 C-REACTIVE PROTEIN: CPT | Performed by: FAMILY MEDICINE

## 2017-09-12 PROCEDURE — 25000128 H RX IP 250 OP 636: Performed by: FAMILY MEDICINE

## 2017-09-12 PROCEDURE — 70470 CT HEAD/BRAIN W/O & W/DYE: CPT

## 2017-09-12 PROCEDURE — 85652 RBC SED RATE AUTOMATED: CPT | Performed by: FAMILY MEDICINE

## 2017-09-12 PROCEDURE — 25000125 ZZHC RX 250: Performed by: FAMILY MEDICINE

## 2017-09-12 PROCEDURE — 96375 TX/PRO/DX INJ NEW DRUG ADDON: CPT | Mod: 59

## 2017-09-12 PROCEDURE — 70486 CT MAXILLOFACIAL W/O DYE: CPT

## 2017-09-12 RX ORDER — DIPHENHYDRAMINE HYDROCHLORIDE 50 MG/ML
50 INJECTION INTRAMUSCULAR; INTRAVENOUS ONCE
Status: COMPLETED | OUTPATIENT
Start: 2017-09-12 | End: 2017-09-12

## 2017-09-12 RX ORDER — IOPAMIDOL 755 MG/ML
100 INJECTION, SOLUTION INTRAVASCULAR ONCE
Status: COMPLETED | OUTPATIENT
Start: 2017-09-12 | End: 2017-09-12

## 2017-09-12 RX ORDER — METOCLOPRAMIDE HYDROCHLORIDE 5 MG/ML
5 INJECTION INTRAMUSCULAR; INTRAVENOUS ONCE
Status: COMPLETED | OUTPATIENT
Start: 2017-09-12 | End: 2017-09-12

## 2017-09-12 RX ORDER — SODIUM CHLORIDE 9 MG/ML
1000 INJECTION, SOLUTION INTRAVENOUS CONTINUOUS
Status: DISCONTINUED | OUTPATIENT
Start: 2017-09-12 | End: 2017-09-12 | Stop reason: HOSPADM

## 2017-09-12 RX ORDER — KETOROLAC TROMETHAMINE 30 MG/ML
30 INJECTION, SOLUTION INTRAMUSCULAR; INTRAVENOUS ONCE
Status: COMPLETED | OUTPATIENT
Start: 2017-09-12 | End: 2017-09-12

## 2017-09-12 RX ORDER — DEXAMETHASONE SODIUM PHOSPHATE 10 MG/ML
10 INJECTION, SOLUTION INTRAMUSCULAR; INTRAVENOUS ONCE
Status: COMPLETED | OUTPATIENT
Start: 2017-09-12 | End: 2017-09-12

## 2017-09-12 RX ADMIN — KETOROLAC TROMETHAMINE 30 MG: 30 INJECTION, SOLUTION INTRAMUSCULAR at 12:49

## 2017-09-12 RX ADMIN — METOCLOPRAMIDE 5 MG: 5 INJECTION, SOLUTION INTRAMUSCULAR; INTRAVENOUS at 12:47

## 2017-09-12 RX ADMIN — IOPAMIDOL 100 ML: 755 INJECTION, SOLUTION INTRAVENOUS at 13:38

## 2017-09-12 RX ADMIN — DIPHENHYDRAMINE HYDROCHLORIDE 50 MG: 50 INJECTION, SOLUTION INTRAMUSCULAR; INTRAVENOUS at 12:51

## 2017-09-12 RX ADMIN — SODIUM CHLORIDE 80 ML: 9 INJECTION, SOLUTION INTRAVENOUS at 13:38

## 2017-09-12 RX ADMIN — SODIUM CHLORIDE 1000 ML: 9 INJECTION, SOLUTION INTRAVENOUS at 12:47

## 2017-09-12 RX ADMIN — DEXAMETHASONE SODIUM PHOSPHATE 10 MG: 10 INJECTION, SOLUTION INTRAMUSCULAR; INTRAVENOUS at 12:50

## 2017-09-12 ASSESSMENT — ENCOUNTER SYMPTOMS
CHILLS: 0
BLOOD IN STOOL: 0
WHEEZING: 0
FREQUENCY: 0
VOMITING: 0
DYSURIA: 0
HEADACHES: 1
EYE PAIN: 1
PHOTOPHOBIA: 0
FEVER: 0
NECK PAIN: 1
SHORTNESS OF BREATH: 0
ABDOMINAL PAIN: 0
COUGH: 0
DIAPHORESIS: 0
DIARRHEA: 0
NERVOUS/ANXIOUS: 1
CONSTIPATION: 0
PALPITATIONS: 0
SINUS PRESSURE: 0
NAUSEA: 0
SORE THROAT: 0

## 2017-09-12 NOTE — ED AVS SNAPSHOT
[facility]    [address] [facility] [address]    Phone:  [phone]    Fax:  [phone]                                       Doretha Fernandez   MRN: [id number]    Department:  [facility]   Date of Visit:  9/12/2017           After Visit Summary Signature Page     I have received my discharge instructions, and my questions have been answered. I have discussed any challenges I see with this plan with the nurse or doctor.    ..........................................................................................................................................  Patient/Patient Representative Signature      ..........................................................................................................................................  Patient Representative Print Name and Relationship to Patient    ..................................................               ................................................  Date                                            Time    ..........................................................................................................................................  Reviewed by Signature/Title    ...................................................              ..............................................  Date                                                            Time

## 2017-09-12 NOTE — DISCHARGE INSTRUCTIONS
"  ICD-10-CM    1. Prothrombin mutation (H) D68.52 Erythrocyte sedimentation rate auto   2. Acute non intractable tension-type headache G44.209     Due to your history of prothrombin mutation, a CT venogram was done and was normal.  Given your prior eye surgery and symptoms since, a CT sinus/orbits was done and was normal other than mild sinus cyst.  May be some allergies present and consider nasal flonase daily (OTC).  This appeared to generalize to migraine headache and improved with IV fluids, benadryl, reglan, toradol.  Decadron was also given which is a steroid that should start working in the next 6 hours and persist for 60 hours.  stay hydrated. follow-up clinic.  Tension headaches, and occipital headaches may be caused by tension/spasm at the trapezius.  maintain neck range of motion.         * Tension Headache    Muscle Tension Headache (also called \"stress headache\") is a very common cause of head pain. Under stress, some people tense the muscles of their shoulder, neck and scalp without knowing it. If this lasts long enough, a headache can occur. These headaches can be very painful and last for hours or even days.  Home Care:    If you were given pain medicine for this headache, do not drive yourself home. Arrange for a ride, instead. When you get home, try to sleep. You should feel much better when you wake up.    Heat to the back of your neck may relieve neck spasm.    Drink only clear liquids or eat a very light diet to avoid nausea/vomiting until symptoms improve.  Preventing Future Headaches    Identify the sources of stress in your life. These may not be obvious! Learn new ways to handle your stress, such as regular exercise, biofeedback, self-hypnosis and meditation. For more information about this, consult your doctor or go to a local bookstore and review the many books and tapes on this subject.    At the first sign of a tension headache, take time out if possible. Remove yourself from the " stressful situation, find a quiet comfortable place to sit or lie down and let yourself relax. Heat and deep massage of the tight areas in the neck and shoulders may help reduce muscle spasm. Medicine, such as ibuprofen (Advil or Motrin) or a prescribed muscle relaxant may be helpful at this point.  Follow Up with your doctor if the headache is not better within the next 24 hours. If you have frequent headaches you should discuss a treatment plan with your primary care doctor. Ask if you can have medicine to take at home the next time you get a bad headache. This may avoid the need for a visit to the emergency department in the future. Poorly controlled chronic headaches may require a referral to a neurologist (headache specialist).  Call your Doctor Or Get Prompt Medical Attention if any of the following occur:    Worsening of your head pain or no improvement within 24 hours    Repeated vomiting (unable to keep liquids down)    Fever of 100.4 F (38.0 C) or higher, or as directed by your healthcare provider    Stiff neck    Extreme drowsiness, confusion or fainting    Dizziness, vertigo (dizziness with spinning sensation)    Weakness of an arm or leg or one side of the face    Difficulty with speech or vision    [id number] [address] [address]. All rights reserved. This information is not intended as a substitute for professional medical care. Always follow your healthcare professional's instructions.          Preventing Tension Headaches  Lifestyle changes are the key to preventing tension-type headaches. Learn what changes in your environment and daily activities can prevent the strain and tension that lead to headaches. If emotional stress is a factor, stress reduction may bring relief. Other lifestyle changes can help keep you healthier and better able to cope with pain.  What may cause your headaches  Causes of tension-type headaches include:    Posture and movement. Your  posture while you sit, work, drive, and even sleep can put stress on your shoulders and neck. This can tighten muscles in the back of your head, causing headaches.    Lifting and carrying. These can cause strain on your back and neck, especially if you carry too much weight, carry weight unevenly, or use poor lifting technique.    Certain sports. Activities that involve jumping, running, and sudden starts, stops, or changes of direction can jar your neck and head. This may lead to tight muscles and pain. Weightlifting and other activities that require upper body strength can lead to tight neck and shoulder muscles.    Jaw tension. Clenching your jaw or grinding your teeth can result in tension and pain. This may happen while you sleep without your knowing it.    Eye problems. Eyestrain can cause tension in the muscles around the eyes. Or a problem with your eyeglass prescription can make you hold your head at an awkward angle. This can cause neck strain and headaches.    Emotional stress. Many factors lead to emotional stress: overwork, family problems, financial difficulties, or sudden life changes. This may cause muscle tension.  What you can do  Once you know what s causing your headaches, you can work to prevent them. You may need to seek professional help to make some of the needed changes.    Posture and movement changes. Change the setup of your workspace and car. Learn and maintain good posture. Avoid positions that strain your neck or shoulders. Make sure your bedding and pillows provide good support. Avoid sleeping on couches, chairs, or floors when a bed is available.     Lifting and carrying. Learn good lifting technique. Make sure to use the proper tools and equipment for lifting.    Change your sport. Switch to a low-impact sport. To help relieve stress on your neck and head, cut back on activities that depend on upper body strength or that put a lot of twisting motion on the back, such as  golf.     Dental work. See your dentist if you think your headaches are caused by jaw tension or teeth grinding.    New eyewear. Buy an extra pair of glasses adjusted for reading or working at a computer. This helps to reduce eyestrain and keep your neck at a comfortable angle.    Stress management. Learn techniques for relaxing and reducing emotional stress, like deep breathing, visualization, progressive relaxation, and biofeedback. Regular sleep habits can also help to control stress.    Regular sleep and meals. It is important to have a regular sleep cycle and to avoid skipping meals.  Exercise can help  Exercise can improve overall health and help you to relax.    Neck exercises help keep your neck muscles relaxed during the day. Try the neck exercises shown on this sheet. Start in a neutral (relaxed, centered) position. Do 3 repetitions every 2 to 4 hours throughout the day.    Low-impact aerobic exercise helps keep your muscles strong and flexible. This helps prevent tension and the pain it causes.    Stretching, ric chi, and yoga help keep your muscles flexible. They may also help relieve emotional stress.    Lower your left ear toward your left shoulder. Return to neutral. Repeat on the right side.   Lower your chin to your chest and slowly return to neutral.     Look to the left. Return to neutral. Then look to the right.     Move your head forward and back while keeping the top of your head level.   Date Last Reviewed: 11/8/2015 2000-2017 [employer]. [address] [place] [address], [place] [address]. All rights reserved. This information is not intended as a substitute for professional medical care. Always follow your healthcare professional's instructions.          What Are Migraine and Tension Headaches?    Although there are several types of headaches, migraine and tension headaches affect the most people. When you have a headache, it isn't your brain that's hurting. Your head aches because  nerves in the bones, blood vessels, meninges, and muscles of your head are irritated. These irritated nerves send pain signals to the brain, which identifies where you hurt and how bad the pain is.  Talk with your healthcare provider about a treatment plan that may help relieve pain and prevent future headaches.   What causes your headache?  The actual headache process is not yet understood. Only rarely are headaches a sign of a serious medical problem. Headache pain may be caused by abnormal interaction between the brain and the nerves and blood vessels in the head. Environmental stresses or certain foods and drinks may trigger headache pain.  What is referred pain?  Headache pain can be referred pain, which is pain that has its source in one place but is felt in another. For example, pain behind the eyes may actually be caused by tense muscles in the neck and shoulders. This means that the place that hurts may not be the part of the body that needs treatment.  Is it a migraine?  Migraine is a vascular headache that causes throbbing pain felt on one or both sides of the head. You may feel nauseated or vomit. This headache may also be preceded or associated with changes in sight (like seeing spots or flashes of light), ability to speak, or sensation (aura). There are a wide variety of environmental and food-related triggers for migraines. The pain may last for 4 to 72 hours. Afterward, you may feel shaky for a day or so. If this is the first time you experience these symptoms, you should immediately seek medical attention because you could be having a stroke.  Is it a tension headache?  This type of headache is usually a dull ache or a sensation of pressure on both sides of the head. It may be associated with pain or tension in the neck and shoulders. Depression, anxiety, and stress can cause a tension headache. The pain may not have a definite beginning or end. It may come and go, or seem never to go away.  When to  "call the healthcare provider  Call your healthcare provider for headaches that happen along with any of these symptoms:    Sudden, severe headache that is different from your usual headache pain    High fever along with a stiff neck    Recurring headache in children     Ongoing numbness or muscle weakness    Loss of vision    Pain following a head injury    Convulsions, or a change in mental awareness    A headache you would call \"the worst headache you've ever had\"   Date Last Reviewed: 9/14/2015 2000-2017 [employer]. [address] [place] [address]. All rights reserved. This information is not intended as a substitute for professional medical care. Always follow your healthcare professional's instructions.            "

## 2017-09-12 NOTE — ED NOTES
Hx of migraines, called neurologist and they requested pt to be seen in ED. Pain started in head yesterday and neck, similar to previous migraines. Takes maintanance meds for migraines, have been controlled since april. Increased stress over summer and last week primarily

## 2017-09-12 NOTE — ED PROVIDER NOTES
History     Chief Complaint   Patient presents with     Headache     Hx of migraines, called neurologist and they requested pt to be seen in ED. Pain started in head yesterday and neck, similar to previous migraines. Takes maintanance meds for migraines, have been controlled since april. Increased stress over summer and last week primarily.      HPI  Doretha Fernandez is a 42 year old female who with a histor of migraine headaches with aura at times. in the last 2 days radiating from the Left occiput to the frontal forehead and periorbital. sharp pain at the occiput.  7/10. nausea without vomiting. no light sensitivity. tooth pain upper and lower, left sided.   No weakness, no changes in speech or swallowing.  prothrombin gene mutation.  not thunderclap headache  also with similar headache in the past including last spring 2017.  chronic daily headache since april 2017  no fever, but sweating. no known sinus congestion, no postnasal drainage.     s/p eyelid surgery - basal cell resection and then reconstruction    Also recently evaluated for left mild axillary adenopathy.    I have reviewed the Medications, Allergies, Past Medical and Surgical History, and Social History in the Epic system.    Allergies:   Allergies   Allergen Reactions     Erythrocin Nausea and Vomiting     Zithromax [Azithromycin Dihydrate] Diarrhea         No current facility-administered medications on file prior to encounter.   Current Outpatient Prescriptions on File Prior to Encounter:  FLUoxetine (PROZAC) 20 MG capsule TAKE THREE CAPSULE BY MOUTH ONCE DAILY   topiramate (TOPAMAX) 50 MG tablet Take 1 tablet (50 mg) by mouth 3 times daily   gabapentin (NEURONTIN) 300 MG capsule Take 4 capsules (1,200 mg) by mouth At Bedtime   erythromycin (ROMYCIN) ophthalmic ointment Apply small amount to incision sites three times daily until tube runs out.   neomycin-polymyxin-dexamethasone (MAXITROL) 3.5-18033-3.1 SUSP ophthalmic susp Place 1 drop Into the  left eye 4 times daily (Patient not taking: Reported on 2017)   DiazePAM (VALIUM PO)    buPROPion (WELLBUTRIN XL) 150 MG 24 hr tablet Take 1 tablet (150 mg) by mouth every morning   topiramate (TOPAMAX) 25 MG tablet Take 100mg at bedtime for 2 weeks, then 125mg  At hs   FLUoxetine (PROZAC) 20 MG capsule Take 3 capsules (60 mg) by mouth daily   methocarbamol (ROBAXIN) 750 MG tablet Take 1 tablet (750 mg) by mouth 3 times daily as needed for other (Back tightness) (Patient not taking: Reported on 2017)   aspirin 81 MG EC tablet Take 81 mg by mouth daily    LORazepam (ATIVAN) 1 MG tablet Take 1 tablet (1 mg) by mouth every 8 hours as needed for anxiety (Patient not taking: Reported on 2017)   Acetaminophen (TYLENOL PO) Take 1,000 mg by mouth every 6 hours as needed for mild pain or fever   fluticasone (FLONASE) 50 MCG/ACT nasal spray Spray 2 sprays into both nostrils daily       Patient Active Problem List   Diagnosis     CARDIOVASCULAR SCREENING; LDL GOAL LESS THAN 160     DUB (dysfunctional uterine bleeding)     Anxiety state     Esophageal reflux     Mild major depression (H)     Mild intermittent asthma without complication     Vision changes     Acute non intractable tension-type headache     Prothrombin mutation (H)       Past Surgical History:   Procedure Laterality Date     GYN SURGERY           REPAIR MOHS Left 2017    Procedure: REPAIR MOHS;  Left Upper Lid Moh's Reconstruction;  Surgeon: Kisha Bosch MD;  Location:  OR       Social History   Substance Use Topics     Smoking status: Passive Smoke Exposure - Never Smoker     Last attempt to quit: 3/20/1998     Smokeless tobacco: Never Used     Alcohol use No      Comment: occ       Most Recent Immunizations   Administered Date(s) Administered     Influenza (IIV3) 10/07/2011     Influenza Vaccine IM 3yrs+ 4 Valent IIV4 2016     TDAP Vaccine (Adacel) 2012       BMI: Estimated body mass index is 36.85 kg/(m^2) as  "calculated from the following:    Height as of this encounter: 1.6 m (5' 3\").    Weight as of this encounter: 94.3 kg (208 lb).      Review of Systems   Constitutional: Negative for chills, diaphoresis and fever.   HENT: Negative for ear pain, sinus pressure and sore throat.    Eyes: Positive for pain. Negative for photophobia and visual disturbance.   Respiratory: Negative for cough, shortness of breath and wheezing.    Cardiovascular: Negative for chest pain and palpitations.   Gastrointestinal: Negative for abdominal pain, blood in stool, constipation, diarrhea, nausea and vomiting.   Genitourinary: Negative for dysuria, frequency and urgency.   Musculoskeletal: Positive for neck pain.   Skin: Negative for rash.   Neurological: Positive for headaches.   Psychiatric/Behavioral: The patient is nervous/anxious.    All other systems reviewed and are negative.      Physical Exam   BP: 126/63  Pulse: 70  Height: 160 cm (5' 3\")  Weight: 94.3 kg (208 lb)  SpO2: 97 %  Physical Exam   Constitutional: She is oriented to person, place, and time. She appears distressed.   HENT:   Head: Atraumatic.   Right Ear: External ear normal.   Mouth/Throat: Oropharynx is clear and moist.   Eyes: Conjunctivae and EOM are normal. Pupils are equal, round, and reactive to light.   Neck: Neck supple.   Cardiovascular: Normal rate and regular rhythm.  Exam reveals no gallop and no friction rub.    No murmur heard.  Pulmonary/Chest: Effort normal and breath sounds normal. No respiratory distress. She has no rales.   Abdominal: She exhibits no distension. There is no tenderness. There is no rebound and no guarding.   Musculoskeletal: She exhibits no edema.   Neurological: She is alert and oriented to person, place, and time. No cranial nerve deficit. She exhibits normal muscle tone. Coordination normal.   Skin: No rash noted. She is not diaphoretic.   Psychiatric: Her mood appears anxious.      Is tearful and concerned.  Sinuses are nontender " to palpation over the frontal and maxillary sinuses.  There are no extra- orbital lesions, deformity or masses.  Occipital tenderness to palpation with minimal spasm in the trapezius on the left side.    Axillary adenopathy is minimal to none    ED Course     ED Course     Procedures             Critical Care time:  none               Results for orders placed or performed during the hospital encounter of 09/12/17   CT Head w/o & w Contrast    Narrative    CT SCAN OF THE HEAD WITHOUT AND WITH CONTRAST   9/12/2017 1:38 PM     HISTORY: Headache, history prothrombin gene mutation. Also with prior  basal cell resection eye with retroorbital pain.      TECHNIQUE:  Axial images of the head and coronal reformations without  and with 100 mL Isovue 370. Radiation dose for this scan was reduced  using automated exposure control, adjustment of the mA and/or kV  according to patient size, or iterative reconstruction technique.    COMPARISON: CT angiogram 4/14/2017.    FINDINGS:  The ventricles are normal in size, shape and configuration.   The brain parenchyma and subarachnoid spaces are normal. There is no  evidence of intracranial hemorrhage, mass, acute infarct or anomaly.  There are no contrast enhancing lesions.     The dural venous sinuses and their branches are well opacified and  there is no evidence of thrombosis of any of these structures. Right  transverse sinus is dominant. Basal veins of [place] and internal  cerebral veins are patent. No evidence of cavernous sinus thrombosis.    The visualized portions of the sinuses and mastoids appear normal.  There is no evidence of trauma.      Impression    IMPRESSION:  Normal CT scan of the head.  Normal CT venogram of the  head.   Maxillofacial  CT w/o contrast    Narrative    CT SCAN OF THE FACE WITHOUT CONTRAST 9/12/2017 1:35 PM     HISTORY: Also with prior basal cell resection eye with LT  retro-orbital pain and maxillary pain since surgery.     TECHNIQUE: Radiation  dose for this scan was reduced using automated  exposure control, adjustment of the mA and/or kV according to patient  size, or iterative reconstruction technique. Noncontrast axial scans  and coronal and sagittal reformations.     COMPARISON: None.    FINDINGS: There is a 2 cm cyst in the right maxillary sinus inferiorly  with minimal mucosal thickening in the maxillary sinuses. Otherwise  the sinuses are clear. No air-fluid level is seen.    The parotid and submandibular glands appear normal. There is no  adenopathy in the upper neck.    The orbits appear normal. Region of the cavernous sinuses is  unremarkable. No bone lesion is seen.      Impression    IMPRESSION: Paranasal sinus mucosal thickening and a cyst in the right  maxillary sinus. Otherwise normal CT scan of the face without  contrast.   CRP inflammation   Result Value Ref Range    CRP Inflammation 6.6 0.0 - 8.0 mg/L   Erythrocyte sedimentation rate auto   Result Value Ref Range    Sed Rate 14 0 - 20 mm/h         Assessments & Plan (with Medical Decision Making)     MDM: Doretha Fernandez is a 42 year old female who presents with a history of prothrombin mutation on and prior eye surgery with resection of a basal cell lesion who has numerous stressors and has a history of tension headaches presents with persistent headache despite worse over the last 48 hours with some generalization to migraine-type headache with nausea vomiting but without light sensitivity.  She was concerned that there may be a lesion related to her eye surgery or that she may have had a CVA as she had previously, although had no abnormal neurologic changes on exam and no symptoms suggestive of CVA.  However given her underlying prothrombin  mutation, CT of the head with venogram was performed and was negative.  Maxillofacial CT was also performed given her history of basal cell cancer resection and reconstruction.  No significant masses were seen.  There were changes that may be  suggestive of underlying allergy such as a cyst in the right sinus as well as mucosal thickening.  I discussed this as below.  Precautions are given for return.  She also had a concern about axillary adenopathy that was evaluated recently performed exam and I did not identify significant adenopathy.  She is to follow up with her primary doctor regarding this.    I have reviewed the nursing notes.    I have reviewed the findings, diagnosis, plan and need for follow up with the patient.       New Prescriptions    No medications on file       Final diagnoses:   Prothrombin mutation (H)   Acute non intractable tension-type headache - Due to your history of prothrombin mutation, a CT venogram was done and was normal.  Given your prior eye surgery and symptoms since, a CT sinus/orbits was done and was normal other than mild sinus cyst.  May be some allergies present and consider nasal flonase daily (OTC).  This appeared to generalize to migraine headache and improved with IV fluids, benadryl, reglan, toradol.  Decadron was also given which is a steroid that should start working in the next 6 hours and persist for 60 hours.  stay hydrated. follow-up clinic.  Tension headaches, and occipital headaches may be caused by tension/spasm at the trapezius.  maintain neck range of motion.       9/12/2017   [facility] EMERGENCY DEPARTMENT     Cezar Bryan MD  09/12/17 [id number]

## 2017-09-12 NOTE — ED AVS SNAPSHOT
[facility] Emergency Department    [address] [facility] [facility] [address]    Phone:  [phone]    Fax:  [phone]                                       Doretha Fernandez   MRN: [id number]    Department:  [facility] Emergency Department   Date of Visit:  9/12/2017           Patient Information     Date Of Birth          1975        Your diagnoses for this visit were:     Prothrombin mutation (H)     Acute non intractable tension-type headache Due to your history of prothrombin mutation, a CT venogram was done and was normal.  Given your prior eye surgery and symptoms since, a CT sinus/orbits was done and was normal other than mild sinus cyst.  May be some allergies present and consider nasal flonase daily (OTC).  This appeared to generalize to migraine headache and improved with IV fluids, benadryl, reglan, toradol.  Decadron was also given which is a steroid that should start working in the next 6 hours and persist for 60 hours.  stay hydrated. follow-up clinic.  Tension headaches, and occipital headaches may be caused by tension/spasm at the trapezius.  maintain neck range of motion.       You were seen by Cezar Bryan MD.      Follow-up Information     Follow up with Anna Naidu MD In 1 week.    Specialty:  Family Practice    Contact information:    [address] [facility] [address] [phone]          Follow up with [facility] Emergency Department.    Specialty:  EMERGENCY MEDICINE    Why:  As needed, If symptoms worsen    Contact information:    [address] [place] [address] [phone]    Additional information:    The medical center is located at   [address] [facility]. (between [address] and   [address] in [place], [place] miles north   of [place]).        Discharge Instructions         ICD-10-CM    1. Prothrombin mutation (H) D68.52 Erythrocyte sedimentation rate auto   2. Acute non intractable tension-type headache G44.209     Due to your history  "of prothrombin mutation, a CT venogram was done and was normal.  Given your prior eye surgery and symptoms since, a CT sinus/orbits was done and was normal other than mild sinus cyst.  May be some allergies present and consider nasal flonase daily (OTC).  This appeared to generalize to migraine headache and improved with IV fluids, benadryl, reglan, toradol.  Decadron was also given which is a steroid that should start working in the next 6 hours and persist for 60 hours.  stay hydrated. follow-up clinic.  Tension headaches, and occipital headaches may be caused by tension/spasm at the trapezius.  maintain neck range of motion.         * Tension Headache    Muscle Tension Headache (also called \"stress headache\") is a very common cause of head pain. Under stress, some people tense the muscles of their shoulder, neck and scalp without knowing it. If this lasts long enough, a headache can occur. These headaches can be very painful and last for hours or even days.  Home Care:    If you were given pain medicine for this headache, do not drive yourself home. Arrange for a ride, instead. When you get home, try to sleep. You should feel much better when you wake up.    Heat to the back of your neck may relieve neck spasm.    Drink only clear liquids or eat a very light diet to avoid nausea/vomiting until symptoms improve.  Preventing Future Headaches    Identify the sources of stress in your life. These may not be obvious! Learn new ways to handle your stress, such as regular exercise, biofeedback, self-hypnosis and meditation. For more information about this, consult your doctor or go to a local bookstore and review the many books and tapes on this subject.    At the first sign of a tension headache, take time out if possible. Remove yourself from the stressful situation, find a quiet comfortable place to sit or lie down and let yourself relax. Heat and deep massage of the tight areas in the neck and shoulders may help " reduce muscle spasm. Medicine, such as ibuprofen (Advil or Motrin) or a prescribed muscle relaxant may be helpful at this point.  Follow Up with your doctor if the headache is not better within the next 24 hours. If you have frequent headaches you should discuss a treatment plan with your primary care doctor. Ask if you can have medicine to take at home the next time you get a bad headache. This may avoid the need for a visit to the emergency department in the future. Poorly controlled chronic headaches may require a referral to a neurologist (headache specialist).  Call your Doctor Or Get Prompt Medical Attention if any of the following occur:    Worsening of your head pain or no improvement within 24 hours    Repeated vomiting (unable to keep liquids down)    Fever of 100.4 F (38.0 C) or higher, or as directed by your healthcare provider    Stiff neck    Extreme drowsiness, confusion or fainting    Dizziness, vertigo (dizziness with spinning sensation)    Weakness of an arm or leg or one side of the face    Difficulty with speech or vision    9997-[address]776 [place] [address]. All rights reserved. This information is not intended as a substitute for professional medical care. Always follow your healthcare professional's instructions.          Preventing Tension Headaches  Lifestyle changes are the key to preventing tension-type headaches. Learn what changes in your environment and daily activities can prevent the strain and tension that lead to headaches. If emotional stress is a factor, stress reduction may bring relief. Other lifestyle changes can help keep you healthier and better able to cope with pain.  What may cause your headaches  Causes of tension-type headaches include:    Posture and movement. Your posture while you sit, work, drive, and even sleep can put stress on your shoulders and neck. This can tighten muscles in the back of your head, causing  headaches.    Lifting and carrying. These can cause strain on your back and neck, especially if you carry too much weight, carry weight unevenly, or use poor lifting technique.    Certain sports. Activities that involve jumping, running, and sudden starts, stops, or changes of direction can jar your neck and head. This may lead to tight muscles and pain. Weightlifting and other activities that require upper body strength can lead to tight neck and shoulder muscles.    Jaw tension. Clenching your jaw or grinding your teeth can result in tension and pain. This may happen while you sleep without your knowing it.    Eye problems. Eyestrain can cause tension in the muscles around the eyes. Or a problem with your eyeglass prescription can make you hold your head at an awkward angle. This can cause neck strain and headaches.    Emotional stress. Many factors lead to emotional stress: overwork, family problems, financial difficulties, or sudden life changes. This may cause muscle tension.  What you can do  Once you know what s causing your headaches, you can work to prevent them. You may need to seek professional help to make some of the needed changes.    Posture and movement changes. Change the setup of your workspace and car. Learn and maintain good posture. Avoid positions that strain your neck or shoulders. Make sure your bedding and pillows provide good support. Avoid sleeping on couches, chairs, or floors when a bed is available.     Lifting and carrying. Learn good lifting technique. Make sure to use the proper tools and equipment for lifting.    Change your sport. Switch to a low-impact sport. To help relieve stress on your neck and head, cut back on activities that depend on upper body strength or that put a lot of twisting motion on the back, such as golf.     Dental work. See your dentist if you think your headaches are caused by jaw tension or teeth grinding.    New eyewear. Buy an extra pair of glasses  adjusted for reading or working at a computer. This helps to reduce eyestrain and keep your neck at a comfortable angle.    Stress management. Learn techniques for relaxing and reducing emotional stress, like deep breathing, visualization, progressive relaxation, and biofeedback. Regular sleep habits can also help to control stress.    Regular sleep and meals. It is important to have a regular sleep cycle and to avoid skipping meals.  Exercise can help  Exercise can improve overall health and help you to relax.    Neck exercises help keep your neck muscles relaxed during the day. Try the neck exercises shown on this sheet. Start in a neutral (relaxed, centered) position. Do 3 repetitions every 2 to 4 hours throughout the day.    Low-impact aerobic exercise helps keep your muscles strong and flexible. This helps prevent tension and the pain it causes.    Stretching, ric chi, and yoga help keep your muscles flexible. They may also help relieve emotional stress.    Lower your left ear toward your left shoulder. Return to neutral. Repeat on the right side.   Lower your chin to your chest and slowly return to neutral.     Look to the left. Return to neutral. Then look to the right.     Move your head forward and back while keeping the top of your head level.   Date Last Reviewed: 11/8/2015 2000-2017 [employer]. [address] [place] [address], [place] [address]. All rights reserved. This information is not intended as a substitute for professional medical care. Always follow your healthcare professional's instructions.          What Are Migraine and Tension Headaches?    Although there are several types of headaches, migraine and tension headaches affect the most people. When you have a headache, it isn't your brain that's hurting. Your head aches because nerves in the bones, blood vessels, meninges, and muscles of your head are irritated. These irritated nerves send pain signals to the brain, which identifies  where you hurt and how bad the pain is.  Talk with your healthcare provider about a treatment plan that may help relieve pain and prevent future headaches.   What causes your headache?  The actual headache process is not yet understood. Only rarely are headaches a sign of a serious medical problem. Headache pain may be caused by abnormal interaction between the brain and the nerves and blood vessels in the head. Environmental stresses or certain foods and drinks may trigger headache pain.  What is referred pain?  Headache pain can be referred pain, which is pain that has its source in one place but is felt in another. For example, pain behind the eyes may actually be caused by tense muscles in the neck and shoulders. This means that the place that hurts may not be the part of the body that needs treatment.  Is it a migraine?  Migraine is a vascular headache that causes throbbing pain felt on one or both sides of the head. You may feel nauseated or vomit. This headache may also be preceded or associated with changes in sight (like seeing spots or flashes of light), ability to speak, or sensation (aura). There are a wide variety of environmental and food-related triggers for migraines. The pain may last for 4 to 72 hours. Afterward, you may feel shaky for a day or so. If this is the first time you experience these symptoms, you should immediately seek medical attention because you could be having a stroke.  Is it a tension headache?  This type of headache is usually a dull ache or a sensation of pressure on both sides of the head. It may be associated with pain or tension in the neck and shoulders. Depression, anxiety, and stress can cause a tension headache. The pain may not have a definite beginning or end. It may come and go, or seem never to go away.  When to call the healthcare provider  Call your healthcare provider for headaches that happen along with any of these symptoms:    Sudden, severe headache that is  "different from your usual headache pain    High fever along with a stiff neck    Recurring headache in children     Ongoing numbness or muscle weakness    Loss of vision    Pain following a head injury    Convulsions, or a change in mental awareness    A headache you would call \"the worst headache you've ever had\"   Date Last Reviewed: 9/14/2015 2000-2017 [employer]. [address] [place] [address]. All rights reserved. This information is not intended as a substitute for professional medical care. Always follow your healthcare professional's instructions.              Future Appointments        Provider Department Dept Phone Center    9/26/2017 12:15 PM Kisha Bosch MD [facility] Ophthalmology [phone] [facility]    9/28/2017 9:30 AM Cheo Adrian MD Eye Clinic [phone] [facility]    9/28/2017 11:30 AM Bairon Miranda MD [facility] Neurology Clinic [phone] [facility]      24 Hour Appointment Hotline       To make an appointment at any [facility], call [phone] ([phone]). If you don't have a family doctor or clinic, we will help you find one. [facility] clinics are conveniently located to serve the needs of you and your family.             Review of your medicines      Our records show that you are taking the medicines listed below. If these are incorrect, please call your family doctor or clinic.        Dose / Directions Last dose taken    aspirin 81 MG EC tablet   Dose:  81 mg        Take 81 mg by mouth daily   Refills:  0        buPROPion 150 MG 24 hr tablet   Commonly known as:  WELLBUTRIN XL   Dose:  150 mg   Quantity:  30 tablet        Take 1 tablet (150 mg) by mouth every morning   Refills:  1        erythromycin ophthalmic ointment   Commonly known as:  ROMYCIN   Quantity:  3.5 g        Apply small amount to incision sites three times daily until tube runs out.   Refills:  0        FLUoxetine 20 MG " capsule   Commonly known as:  PROzac   Quantity:  270 capsule        TAKE THREE CAPSULE BY MOUTH ONCE DAILY   Refills:  1        fluticasone 50 MCG/ACT spray   Commonly known as:  FLONASE   Dose:  2 spray   Quantity:  1 g        Spray 2 sprays into both nostrils daily   Refills:  3        gabapentin 300 MG capsule   Commonly known as:  NEURONTIN   Dose:  1200 mg   Quantity:  360 capsule        Take 4 capsules (1,200 mg) by mouth At Bedtime   Refills:  1        LORazepam 1 MG tablet   Commonly known as:  ATIVAN   Dose:  1 mg   Quantity:  30 tablet        Take 1 tablet (1 mg) by mouth every 8 hours as needed for anxiety   Refills:  0        methocarbamol 750 MG tablet   Commonly known as:  ROBAXIN   Dose:  750 mg   Quantity:  30 tablet        Take 1 tablet (750 mg) by mouth 3 times daily as needed for other (Back tightness)   Refills:  0        neomycin-polymyxin-dexamethasone [id number] Susp ophthalmic susp   Commonly known as:  MAXITROL   Dose:  1 drop   Quantity:  1 Bottle        Place 1 drop Into the left eye 4 times daily   Refills:  0        * topiramate 25 MG tablet   Commonly known as:  TOPAMAX   Quantity:  180 tablet        Take 100mg at bedtime for 2 weeks, then 125mg  At hs   Refills:  3        * topiramate 50 MG tablet   Commonly known as:  TOPAMAX   Dose:  50 mg   Quantity:  270 tablet        Take 1 tablet (50 mg) by mouth 3 times daily   Refills:  3        TYLENOL PO   Dose:  1000 mg        Take 1,000 mg by mouth every 6 hours as needed for mild pain or fever   Refills:  0        VALIUM PO        Refills:  0        * Notice:  This list has 2 medication(s) that are the same as other medications prescribed for you. Read the directions carefully, and ask your doctor or other care provider to review them with you.            Procedures and tests performed during your visit     CRP inflammation    CT Head w/o & w Contrast    Erythrocyte sedimentation rate auto    Maxillofacial  CT w/o contrast      Orders  Needing Specimen Collection     None      Pending Results     Date and Time Order Name Status Description    9/12/2017 1202 Maxillofacial  CT w/o contrast Preliminary     9/12/2017 1202 CT Head w/o & w Contrast Preliminary             Pending Culture Results     No orders found from 9/10/2017 to 9/13/2017.            Pending Results Instructions     If you had any lab results that were not finalized at the time of your Discharge, you can call the ED Lab Result RN at [phone]. You will be contacted by this team for any positive Lab results or changes in treatment. The nurses are available 7 days a week from 10A to 6:30P.  You can leave a message 24 hours per day and they will return your call.        Test Results From Your Hospital Stay        9/12/2017  1:56 PM      Narrative     CT SCAN OF THE HEAD WITHOUT AND WITH CONTRAST   9/12/2017 1:38 PM     HISTORY: Headache, history prothrombin gene mutation. Also with prior  basal cell resection eye with retroorbital pain.      TECHNIQUE:  Axial images of the head and coronal reformations without  and with 100 mL Isovue 370. Radiation dose for this scan was reduced  using automated exposure control, adjustment of the mA and/or kV  according to patient size, or iterative reconstruction technique.    COMPARISON: CT angiogram 4/14/2017.    FINDINGS:  The ventricles are normal in size, shape and configuration.   The brain parenchyma and subarachnoid spaces are normal. There is no  evidence of intracranial hemorrhage, mass, acute infarct or anomaly.  There are no contrast enhancing lesions.     The dural venous sinuses and their branches are well opacified and  there is no evidence of thrombosis of any of these structures. Right  transverse sinus is dominant. Basal veins of [place] and internal  cerebral veins are patent. No evidence of cavernous sinus thrombosis.    The visualized portions of the sinuses and mastoids appear normal.  There is no evidence of trauma.         Impression     IMPRESSION:  Normal CT scan of the head.  Normal CT venogram of the  head.         9/12/2017  2:01 PM      Narrative     CT SCAN OF THE FACE WITHOUT CONTRAST 9/12/2017 1:35 PM     HISTORY: Also with prior basal cell resection eye with LT  retro-orbital pain and maxillary pain since surgery.     TECHNIQUE: Radiation dose for this scan was reduced using automated  exposure control, adjustment of the mA and/or kV according to patient  size, or iterative reconstruction technique. Noncontrast axial scans  and coronal and sagittal reformations.     COMPARISON: None.    FINDINGS: There is a 2 cm cyst in the right maxillary sinus inferiorly  with minimal mucosal thickening in the maxillary sinuses. Otherwise  the sinuses are clear. No air-fluid level is seen.    The parotid and submandibular glands appear normal. There is no  adenopathy in the upper neck.    The orbits appear normal. Region of the cavernous sinuses is  unremarkable. No bone lesion is seen.        Impression     IMPRESSION: Paranasal sinus mucosal thickening and a cyst in the right  maxillary sinus. Otherwise normal CT scan of the face without  contrast.         9/12/2017  1:03 PM      Component Results     Component Value Ref Range & Units Status    CRP Inflammation 6.6 0.0 - 8.0 mg/L Final         9/12/2017  2:14 PM      Component Results     Component Value Ref Range & Units Status    Sed Rate 14 0 - 20 mm/h Final                Thank you for choosing [facility]       Thank you for choosing [facility] for your care. Our goal is always to provide you with excellent care. Hearing back from our patients is one way we can continue to improve our services. Please take a few minutes to complete the written survey that you may receive in the mail after you visit with us. Thank you!        Telemedicine Clin[facility] Information     [facility] gives you secure access to your electronic health record. If you see a primary care provider, you can also send messages to your  care team and make appointments. If you have questions, please call your primary care clinic.  If you do not have a primary care provider, please call [phone] and they will assist you.        Care EveryWhere ID     This is your Care EveryWhere ID. This could be used by other organizations to access your [place] medical records  [id number]        Equal Access to Services     BRIGETTE QUARLES : Niclo Gustafson, melba valencia, pancho tate. So [facility] [phone].    ATENCIÓN: Si habla español, tiene a mcdonnell disposición servicios gratuitos de asistencia lingüística. Llame al [phone].    We comply with applicable federal civil rights laws and [place] laws. We do not discriminate on the basis of race, color, national origin, age, disability sex, sexual orientation or gender identity.            After Visit Summary       This is your record. Keep this with you and show to your community pharmacist(s) and doctor(s) at your next visit.

## 2017-09-13 ENCOUNTER — CARE COORDINATION (OUTPATIENT)
Dept: NEUROLOGY | Facility: CLINIC | Age: 42
End: 2017-09-13

## 2017-09-13 NOTE — PROGRESS NOTES
----- Message -----      From: Bairon Miranda MD      Sent: 9/12/2017   2:16 PM        To: Stefanie Childs RN     I've talked to her about taking extra dosage of Neurontin 600mg for pain to break migraine. Tell her to make routine appt with me and will review her other meds[zonegren,et al] and possibly botox   ----- Message -----      From: Stefanie Childs RN      Sent: 9/12/2017   2:00 PM        To: Bairon Miranda MD     Doretha called to report that she has had a migraine for two days and the pain is 10/10.   She is crying from the pain and is experiencing nausea and vomiting.  She currently is taking 1200 mg Neurontin daily and 200 mg Topamax daily.  She does not have any rescue medications for her migraines.   I advised her to go to the ED for immediate pain control which she did.   Please advise on possible rescue medication for her migraines.     Thank you!   Stefanie          9/13/17: Tried contacting patient with Dr Miranda's response. She did not answer. She does have an upcoming appointment with Dr Miranda in 2 weeks. Left her a detailed voicemail message explaining that Dr Miranda will discuss meds, etc at her appointment. Reminded her that she can take an additional 600 mg of Neurontin for migraine pain. Asked her to call me with any questions or concerns. Contact info given.

## 2017-09-21 ENCOUNTER — CARE COORDINATION (OUTPATIENT)
Dept: NEUROLOGY | Facility: CLINIC | Age: 42
End: 2017-09-21

## 2017-09-21 NOTE — PROGRESS NOTES
Frannie Tran LPN McAllister, Angela, TEJINDER        Phone Number: [phone] (Call me)                     Caller name: patient     Treating provider/specialty: Dr Miranda     Nurse:     Best time to return call: anytime   Message left?     Description of issue/question:   symptom feels worse-skin is burning, n+v -migraines hasn't gone away.   Went to emergency room last week. Helped for a few days.   Is on max dose of medications and needs help now.   please advise.           9/21/17: tried calling patient but she did not answer. Left voicemail message stating that Dr Miranda is out of town until next week. She does have an appointment next Thursday with him. Advised her to go back to ED for acute management/pain relief. Left my call back number incase she has any questions or concerns.

## 2017-09-26 ENCOUNTER — OFFICE VISIT (OUTPATIENT)
Dept: OPHTHALMOLOGY | Facility: CLINIC | Age: 42
End: 2017-09-26

## 2017-09-26 DIAGNOSIS — Z98.890 POSTOPERATIVE EYE STATE: ICD-10-CM

## 2017-09-26 DIAGNOSIS — H02.9 EYELID LESION: ICD-10-CM

## 2017-09-26 DIAGNOSIS — H53.10 SUBJECTIVE VISUAL DISTURBANCE: Primary | ICD-10-CM

## 2017-09-26 ASSESSMENT — REFRACTION_WEARINGRX
OD_SPHERE: -0.50
OD_AXIS: 010
SPECS_TYPE: SVL
OS_CYLINDER: +2.25
OS_AXIS: 015
OS_SPHERE: -1.75
OD_CYLINDER: +0.75

## 2017-09-26 ASSESSMENT — VISUAL ACUITY
OS_CC+: -1
OS_CC: 20/30
OD_CC: 20/20
METHOD: SNELLEN - LINEAR
CORRECTION_TYPE: GLASSES

## 2017-09-26 ASSESSMENT — EXTERNAL EXAM - LEFT EYE: OS_EXAM: NORMAL

## 2017-09-26 ASSESSMENT — TONOMETRY
OD_IOP_MMHG: 16
IOP_METHOD: ICARE
OS_IOP_MMHG: 14

## 2017-09-26 ASSESSMENT — EXTERNAL EXAM - RIGHT EYE: OD_EXAM: NORMAL

## 2017-09-26 NOTE — MR AVS SNAPSHOT
After Visit Summary   9/26/2017    Doretha Fernandez    MRN: [id number]           Patient Information     Date Of Birth          1975        Visit Information        Provider Department      9/26/2017 12:15 PM Kisha Bosch MD [facility]        Today's Diagnoses     Postoperative eye state - Left Eye        Eyelid lesion - Left Eye           Follow-ups after your visit        Follow-up notes from your care team     Return if symptoms worsen or fail to improve.      Your next 10 appointments already scheduled     Sep 28, 2017  9:30 AM CDT   RETURN NEURO with Cheo Norton MD   Eye Clinic ([facility])    [address]  [address]  [address]  [place] [facility] [address]   [phone]            Sep 28, 2017 11:30 AM CDT   Return Visit with Bairon Miranda MD   [facility]    [address], [address]  [place] [address] [phone]            Oct 12, 2017  9:45 AM CDT   New Visit with AMADA Carbajal [facility] ([facility])    [address] [address] [phone]              Future tests that were ordered for you today     Open Future Orders        Priority Expected Expires Ordered    IOP Measurement Routine  11/25/2017 9/26/2017    Color Vision - Screening OU (both eyes) Routine  11/25/2017 9/26/2017            Who to contact     Please call your clinic at [phone] to:    Ask questions about your health    Make or cancel appointments    Discuss your medicines    Learn about your test results    Speak to your doctor   If you have compliments or concerns about an experience at your clinic, or if you wish to file a complaint, please contact [facility] Patient Relations at [phone] or email us at [email or web address]         Additional  Information About Your Visit        [employer] Information     [employer] gives you secure access to your electronic health record. If you see a primary care provider, you can also send messages to your care team and make appointments. If you have questions, please call your primary care clinic.  If you do not have a primary care provider, please call [phone] and they will assist you.      [employer] is an electronic gateway that provides easy, online access to your medical records. With [employer], you can request a clinic appointment, read your test results, renew a prescription or communicate with your care team.     To access your existing account, please contact your [facility] or call [phone] for assistance.        Care EveryWhere ID     This is your Care EveryWhere ID. This could be used by other organizations to access your [place] medical records  [id number]         Blood Pressure from Last 3 Encounters:   09/12/17 96/52   09/06/17 107/76   08/24/17 111/81    Weight from Last 3 Encounters:   09/12/17 94.3 kg (208 lb)   09/06/17 94.7 kg (208 lb 12.8 oz)   07/25/17 94.8 kg (209 lb)              Today, you had the following     No orders found for display         Today's Medication Changes          These changes are accurate as of: 9/26/17  1:08 PM.  If you have any questions, ask your nurse or doctor.               These medicines have changed or have updated prescriptions.        Dose/Directions    * topiramate 25 MG tablet   Commonly known as:  TOPAMAX   This may have changed:    - how much to take  - additional instructions   Used for:  Intractable chronic migraine without aura and without status migrainosus   Changed by:  Bairon Miranda MD        Take 100mg at bedtime for 2 weeks, then 125mg  At hs   Quantity:  180 tablet   Refills:  3       * topiramate 50 MG tablet   Commonly known as:  TOPAMAX   This may have changed:  Another medication with the same name was  changed. Make sure you understand how and when to take each.   Used for:  Vision changes, Intractable migraine with aura with status migrainosus   Changed by:  Ade Gray RN        Dose:  50 mg   Take 1 tablet (50 mg) by mouth 3 times daily   Quantity:  270 tablet   Refills:  3       * Notice:  This list has 2 medication(s) that are the same as other medications prescribed for you. Read the directions carefully, and ask your doctor or other care provider to review them with you.             Primary Care Provider Office Phone # Fax #    Anna Naidu [phone] [address] [address]        Equal Access to Services     [facility]: Hadii aad mariola hadasho Soalphonso, waaxda luqadaha, qaybta kaalmada adeegyada, pancho moore . So [facility] [phone].    ATENCIÓN: Si habla español, tiene a mcdonnell disposición servicios gratuitos de asistencia lingüística. [facility] [phone].    We comply with applicable federal civil rights laws and [place] laws. We do not discriminate on the basis of race, color, national origin, age, disability sex, sexual orientation or gender identity.            Thank you!     Thank you for choosing [facility]  for your care. Our goal is always to provide you with excellent care. Hearing back from our patients is one way we can continue to improve our services. Please take a few minutes to complete the written survey that you may receive in the mail after your visit with us. Thank you!             Your Updated Medication List - Protect others around you: Learn how to safely use, store and throw away your medicines at www.disposemymeds.org.          This list is accurate as of: 9/26/17  1:08 PM.  Always use your most recent med list.                   Brand Name Dispense Instructions for use Diagnosis    aspirin 81 MG EC tablet      Take 81 mg by mouth daily        buPROPion 150 MG 24 hr tablet    WELLBUTRIN XL     30 tablet    Take 1 tablet (150 mg) by mouth every morning    Mild major depression (H)       FLUoxetine 20 MG capsule    PROzac    270 capsule    TAKE THREE CAPSULE BY MOUTH ONCE DAILY    Major depressive disorder, single episode, mild (H)       fluticasone 50 MCG/ACT spray    FLONASE    1 g    Spray 2 sprays into both nostrils daily    Seasonal allergic rhinitis       gabapentin 300 MG capsule    NEURONTIN    360 capsule    Take 4 capsules (1,200 mg) by mouth At Bedtime    Acute non intractable tension-type headache       LORazepam 1 MG tablet    ATIVAN    30 tablet    Take 1 tablet (1 mg) by mouth every 8 hours as needed for anxiety    Anxiety, Acute intractable tension-type headache       * topiramate 25 MG tablet    TOPAMAX    180 tablet    Take 100mg at bedtime for 2 weeks, then 125mg  At hs    Intractable chronic migraine without aura and without status migrainosus       * topiramate 50 MG tablet    TOPAMAX    270 tablet    Take 1 tablet (50 mg) by mouth 3 times daily    Vision changes, Intractable migraine with aura with status migrainosus       TYLENOL PO      Take 1,000 mg by mouth every 6 hours as needed for mild pain or fever        VALIUM PO           * Notice:  This list has 2 medication(s) that are the same as other medications prescribed for you. Read the directions carefully, and ask your doctor or other care provider to review them with you.

## 2017-09-26 NOTE — PROGRESS NOTES
"Chief Complaints and History of Present Illnesses   Patient presents with     Post Op (Ophthalmology) Left Eye     POM#2 s/p YANDEL  wedge resection and reconstruction (less than 25% of eyelid margin)       Recently took herself of gabapentin and topomax.  She says she is \"not feeling well at all\".  Feels like she is feeling better since being off the medication.  She feels like her skin is burning everywhere.  Feels like these symptoms are most likely related to her medications.  No specific lid pain or other visual complaints today.       Doretha Fernandez is a 42 year old female with the following diagnoses:   1. Postoperative eye state - Left Eye    2. Eyelid lesion - Left Eye       Expected post-operative course  Periorbital eye pain believed to not be related to surgery  Patient is following up with Neurology on Thursday  Follow-up as needed       Hansel Jensen MD  Ophthalmology Resident, PGY-2    Agree with above.  left upper lid looks great, she is happy s/p Mohs for Basal cell carcinoma.   Very troubled with headache, seeing Dr. Miranda today, and has f/u with Dr. Norton on Thursday.  Feels Topomax and gabapentin made her feel like she was in a fog.    F/u with me as needed.    Complete documentation of historical and exam elements from today's encounter can be found in the full encounter summary report (not reduplicated in this progress note). I personally obtained the chief complaint(s) and history of present illness.  I confirmed and edited as necessary the review of systems, past medical/surgical history, family history, social history, and examination findings as documented by others; and I examined the patient myself. I personally reviewed the relevant tests, images, and reports as documented above. I formulated and edited as necessary the assessment and plan and discussed the findings and management plan with the patient and family. - Kisha Bosch MD      "

## 2017-09-26 NOTE — NURSING NOTE
"Chief Complaints and History of Present Illnesses   Patient presents with     Post Op (Ophthalmology) Left Eye     POM#2 s/p YANDEL  wedge resection and reconstruction (less than 25% of eyelid margin)     HPI    Affected eye(s):  Both   Symptoms:     No blurred vision   No tearing   No itching   No burning         Do you have eye pain now?:  No      Comments:  Patient notes she has both lid and eye pain in the LE, it aches, \"nerves feel weird on left side\"    Sindy Gautam September 26, 2017 12:21 PM               "

## 2017-09-26 NOTE — Clinical Note
THanks for sending Doretha, her lid looks great post mohs reconstruction for Basal cell carcinoma. Has major headache issues, seeing Ruth today, and has an appointment with you Thursday. Thanks.

## 2017-09-27 ENCOUNTER — TELEPHONE (OUTPATIENT)
Dept: FAMILY MEDICINE | Facility: CLINIC | Age: 42
End: 2017-09-27

## 2017-09-27 DIAGNOSIS — R22.32 MASS OF LEFT AXILLA: Primary | ICD-10-CM

## 2017-09-27 NOTE — TELEPHONE ENCOUNTER
I decided to order a diagnostic mammogram and ultrasound of the axilla - upper/outer quadrant of the breast.  Breast tissue can track into the axilla.  If there is a solid mass a biopsy can be done and scheduled through diagnostics department.     Anna Naidu M.D.

## 2017-09-27 NOTE — TELEPHONE ENCOUNTER
Pt called back and was notified of the message to make an appt and the number was given to pt.  Michell Taylor

## 2017-09-27 NOTE — TELEPHONE ENCOUNTER
"Reason for call:  Patient reporting a symptom    Symptom or request: Pt continues to have a \"lump\" in her left armpit.  Pt states that Dr. Naidu never has any openings and she has been trying to get in for weeks.  She states that she has been calling early every am and the appts are all gone.  She declined to make an appt with another provider and only wants to see Dr. Naidu TODAY    Duration (how long have symptoms been present): ongoing    Have you been treated for this before? Yes    Additional comments:     Phone Number patient can be reached at:  Home number on file [phone] (home)    Best Time:  any    Can we leave a detailed message on this number:  YES    Call taken on 9/27/2017 at 7:44 AM by Jena Quintero    "

## 2017-09-27 NOTE — TELEPHONE ENCOUNTER
S-(situation): US/biopsy of lymph node in armpit of Lt breast.    B-(background): Patient was seen 9-6-17 for lymph node enlargement.  Patient states she was to return to clinic in 2 weeks if not resolved.  Patient states she has been unable to get appointment with PCP - calls early morning and appts are gone.  She does not want to see another provider.  Her lymph node has increased in size since OV and patient would like to know if US/biopsy can be done.  OV notes below.    OV notes 9-6-17:   If the node persists, would get US/biopsy of this.   Patient would like to see Psych for her anxiety/depression which is not well controlled. Referral made.   Patient Instructions   Reassured. Went over the nature of lymphadenopathy and the warning signs including continued growth. Will follow expectantly for now.  Recheck if not improving as expected and in 1 month unless resolving.  See Psychiatry - Erendira Rene NP or Dr. Camacho    A-(assessment): US/biopsy request    R-(recommendations): Please advise.    Routing to provider.  Nohemy MOLINA RN

## 2017-09-27 NOTE — TELEPHONE ENCOUNTER
Left message for patient to return call to clinic.  [facility] - ok to deliver msg below.  Diagnostics # [phone].  Nohemy MOLINA RN

## 2017-09-28 ENCOUNTER — OFFICE VISIT (OUTPATIENT)
Dept: NEUROLOGY | Facility: CLINIC | Age: 42
End: 2017-09-28

## 2017-09-28 ENCOUNTER — HOSPITAL ENCOUNTER (EMERGENCY)
Facility: CLINIC | Age: 42
Discharge: HOME | End: 2017-09-28
Attending: STUDENT IN AN ORGANIZED HEALTH CARE EDUCATION/TRAINING PROGRAM | Admitting: STUDENT IN AN ORGANIZED HEALTH CARE EDUCATION/TRAINING PROGRAM
Payer: COMMERCIAL

## 2017-09-28 VITALS — DIASTOLIC BLOOD PRESSURE: 98 MMHG | SYSTOLIC BLOOD PRESSURE: 125 MMHG | RESPIRATION RATE: 18 BRPM | HEART RATE: 77 BPM

## 2017-09-28 VITALS
HEART RATE: 62 BPM | RESPIRATION RATE: 16 BRPM | DIASTOLIC BLOOD PRESSURE: 58 MMHG | WEIGHT: 200 LBS | BODY MASS INDEX: 35.43 KG/M2 | OXYGEN SATURATION: 98 % | TEMPERATURE: 97.6 F | SYSTOLIC BLOOD PRESSURE: 92 MMHG

## 2017-09-28 DIAGNOSIS — R51.9 HEADACHE DISORDER: ICD-10-CM

## 2017-09-28 DIAGNOSIS — R11.0 NAUSEA: ICD-10-CM

## 2017-09-28 DIAGNOSIS — R51.9 HEADACHE, UNSPECIFIED HEADACHE TYPE: ICD-10-CM

## 2017-09-28 DIAGNOSIS — R53.83 TIRED: Primary | ICD-10-CM

## 2017-09-28 PROCEDURE — S0166 INJ OLANZAPINE 2.5MG: HCPCS | Performed by: STUDENT IN AN ORGANIZED HEALTH CARE EDUCATION/TRAINING PROGRAM

## 2017-09-28 PROCEDURE — 25000125 ZZHC RX 250: Performed by: STUDENT IN AN ORGANIZED HEALTH CARE EDUCATION/TRAINING PROGRAM

## 2017-09-28 PROCEDURE — 99284 EMERGENCY DEPT VISIT MOD MDM: CPT | Mod: 25

## 2017-09-28 PROCEDURE — 99284 EMERGENCY DEPT VISIT MOD MDM: CPT | Performed by: STUDENT IN AN ORGANIZED HEALTH CARE EDUCATION/TRAINING PROGRAM

## 2017-09-28 PROCEDURE — 96372 THER/PROPH/DIAG INJ SC/IM: CPT

## 2017-09-28 PROCEDURE — 25000128 H RX IP 250 OP 636: Performed by: STUDENT IN AN ORGANIZED HEALTH CARE EDUCATION/TRAINING PROGRAM

## 2017-09-28 RX ORDER — ONDANSETRON 4 MG/1
4 TABLET, ORALLY DISINTEGRATING ORAL ONCE
Status: COMPLETED | OUTPATIENT
Start: 2017-09-28 | End: 2017-09-28

## 2017-09-28 RX ORDER — DIPHENHYDRAMINE HYDROCHLORIDE 50 MG/ML
25 INJECTION INTRAMUSCULAR; INTRAVENOUS ONCE
Status: DISCONTINUED | OUTPATIENT
Start: 2017-09-28 | End: 2017-09-28 | Stop reason: HOSPADM

## 2017-09-28 RX ORDER — DIAZEPAM 10 MG
10 TABLET ORAL EVERY 6 HOURS PRN
Qty: 1 TABLET | Refills: 0 | Status: SHIPPED | OUTPATIENT
Start: 2017-09-28 | End: 2017-10-12

## 2017-09-28 RX ORDER — DEXAMETHASONE SODIUM PHOSPHATE 4 MG/ML
10 INJECTION, SOLUTION INTRA-ARTICULAR; INTRALESIONAL; INTRAMUSCULAR; INTRAVENOUS; SOFT TISSUE ONCE
Status: DISCONTINUED | OUTPATIENT
Start: 2017-09-28 | End: 2017-09-28 | Stop reason: HOSPADM

## 2017-09-28 RX ORDER — ZONISAMIDE 25 MG/1
CAPSULE ORAL
Qty: 90 CAPSULE | Refills: 1 | Status: ON HOLD | OUTPATIENT
Start: 2017-09-28 | End: 2018-03-08

## 2017-09-28 RX ORDER — SODIUM CHLORIDE 9 MG/ML
1000 INJECTION, SOLUTION INTRAVENOUS CONTINUOUS
Status: DISCONTINUED | OUTPATIENT
Start: 2017-09-28 | End: 2017-09-28 | Stop reason: HOSPADM

## 2017-09-28 RX ORDER — PROCHLORPERAZINE MALEATE 5 MG
10 TABLET ORAL ONCE
Status: DISCONTINUED | OUTPATIENT
Start: 2017-09-28 | End: 2017-09-28 | Stop reason: HOSPADM

## 2017-09-28 RX ORDER — OLANZAPINE 10 MG/2ML
7.5 INJECTION, POWDER, FOR SOLUTION INTRAMUSCULAR DAILY PRN
Status: DISCONTINUED | OUTPATIENT
Start: 2017-09-28 | End: 2017-09-28 | Stop reason: HOSPADM

## 2017-09-28 RX ORDER — ONDANSETRON 4 MG/1
4-8 TABLET, ORALLY DISINTEGRATING ORAL EVERY 8 HOURS PRN
Qty: 10 TABLET | Refills: 0 | Status: SHIPPED | OUTPATIENT
Start: 2017-09-28 | End: 2017-10-01

## 2017-09-28 RX ORDER — KETOROLAC TROMETHAMINE 30 MG/ML
30 INJECTION, SOLUTION INTRAMUSCULAR; INTRAVENOUS ONCE
Status: COMPLETED | OUTPATIENT
Start: 2017-09-28 | End: 2017-09-28

## 2017-09-28 RX ADMIN — OLANZAPINE 7.5 MG: 10 INJECTION, POWDER, FOR SOLUTION INTRAMUSCULAR at 17:22

## 2017-09-28 RX ADMIN — KETOROLAC TROMETHAMINE 30 MG: 30 INJECTION, SOLUTION INTRAMUSCULAR at 17:24

## 2017-09-28 RX ADMIN — ONDANSETRON 4 MG: 4 TABLET, ORALLY DISINTEGRATING ORAL at 17:28

## 2017-09-28 RX ADMIN — ONDANSETRON 4 MG: 4 TABLET, ORALLY DISINTEGRATING ORAL at 19:31

## 2017-09-28 ASSESSMENT — PAIN SCALES - GENERAL: PAINLEVEL: EXTREME PAIN (9)

## 2017-09-28 NOTE — ED PROVIDER NOTES
"  History     Chief Complaint   Patient presents with     Headache     hx of headaches, left neurologist office with worse HA. Comes in screaming and hysterical in triage.      HPI  Doretha Fernandez is a 42 year old female with past medical history which includes anxiety, depression, dysfunctional uterine bleeding, GERD, and frequent headaches who presents for evaluation of nausea, vomiting, and headache. Patient explains that she has had intermittent left-sided headaches diagnosed as migraine headaches for the past several months. She has been seeing [place] neurologist Ruth for the headaches but abruptly discontinued her prescription medications Wellbutrin, Topamax, and Neurontin 7 days ago because she felt it was causing her to \"feel drunk\". Since discontinuing the medications her headache has grown worse as have the nausea. Today she vomited for the first time after leaving neurology clinic. Patient specifically denies fever/chills, sore throat, cough, chest pain, back pain, abdominal pain, diarrhea, or genitourinary symptoms. Of note, neurologist prescribed zonisamide and Valium today for headaches which patient has yet to take.    I have reviewed the Medications, Allergies, Past Medical and Surgical History, and Social History in the Epic system.    Patient Active Problem List   Diagnosis     CARDIOVASCULAR SCREENING; LDL GOAL LESS THAN 160     DUB (dysfunctional uterine bleeding)     Anxiety state     Esophageal reflux     Mild major depression (H)     Mild intermittent asthma without complication     Vision changes     Acute non intractable tension-type headache     Prothrombin mutation (H)       Past Surgical History:   Procedure Laterality Date     GYN SURGERY           REPAIR MOHS Left 2017    Procedure: REPAIR MOHS;  Left Upper Lid Moh's Reconstruction;  Surgeon: Kisha Bosch MD;  Location:  OR       Social History     Social History     Marital status:      Spouse " name: N/A     Number of children: N/A     Years of education: N/A     Occupational History     Not on file.     Social History Main Topics     Smoking status: Passive Smoke Exposure - Never Smoker     Last attempt to quit: 3/20/1998     Smokeless tobacco: Never Used     Alcohol use No      Comment: occ     Drug use: No     Sexual activity: Yes     Partners: Male     Birth control/ protection: Surgical     Other Topics Concern     Parent/Sibling W/ Cabg, Mi Or Angioplasty Before 65f 55m? No     Social History Narrative    19 y.o- patient's mother   of lung cancer. She had to take care of her younger sister.    2012- patient's  had a heart attack with stents placed, followed by cardiac rehabilitation    2000 TO 2012  was in [facility] for depression    2013 patient's  went through alcohol rehabilitation at [place] inpatient            They have attended couple counseling a couple of times and patient went to the family program for chemical dependency.    Patient denies alcohol or drug use and herself            Has 2 children, girls ages 5 and 8    [place] [employer] and also works for the [employer]. 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               Family History   Problem Relation Age of Onset     CANCER Mother 45     lung     Neurologic Disorder Mother      Lipids Father      GASTROINTESTINAL DISEASE Father      Depression Father      CANCER Maternal Grandmother      Blood Disease Maternal Grandmother      Arthritis Maternal Grandmother      DIABETES Maternal Grandmother      Depression Maternal Grandmother      Macular Degeneration Maternal Grandmother      Glaucoma Maternal Grandmother      DIABETES Maternal Grandfather      CEREBROVASCULAR DISEASE Maternal Grandfather      Blood Disease Maternal Grandfather      HEART DISEASE Maternal Grandfather      Glaucoma Maternal Grandfather      CANCER Paternal  Grandmother      Cancer - colorectal Paternal Grandmother      Respiratory Paternal Grandfather      Blood Disease Paternal Grandfather      HEART DISEASE Daughter      Asthma Daughter      Depression Sister        Most Recent Immunizations   Administered Date(s) Administered     Influenza (IIV3) 10/07/2011     Influenza Vaccine IM 3yrs+ 4 Valent IIV4 11/09/2016     TDAP Vaccine (Adacel) 04/09/2012         Review of Systems  Constitutional: Negative for fever or chills.  HENT: Negative oral or throat pain.  Eye: Negative for visual changes from baseline.  Respiratory: Negative for cough or shortness of breath.  Cardiovascular: Negative for chest pain.  Gastrointestinal: Positive for nausea with single episode of emesis today. Denies abdominal pain or diarrhea.  Musculoskeletal: Negative for neck stiffness, back pain, or recent injury.  Neurological: Positive for chronic left-sided headache.    All others reviewed and are negative.      Physical Exam   BP: 146/76  Pulse: 98  Temp: 97.6  F (36.4  C)  Resp: 16  Weight: 90.7 kg (200 lb)  SpO2: 96 %  Physical Exam  Constitutional: Well developed, well nourished. Appears nontoxic but discomfort secondary to headache.   Head: Normocephalic and atraumatic. Symmetrical in appearance. No palpable temporal arteries.  Eyes: Conjunctivae are normal. EOMI and denies diplopia. PERRLA. Negative for nystagmus.  Neck: Neck supple and without nuchal rigidity.  Cardiovascular: No cyanosis. RRR.  Respiratory/Chest: Effort normal, no respiratory distress. CTAB.  Gastrointestinal: Soft, normal bowel sounds. Nondistended. Nontender to palpation and McBurney's point. Negative for Mandel's sign. No guarding, rigidity, or rebound tenderness.  Musculoskeletal: Moves all extremities spontaneously and without complaint.  Neuro: Patient is alert and oriented, ambulated without difficulty or ataxia.  Skin: Skin is warm and dry, not diaphoretic.      ED Course     ED Course     Procedures              Critical Care time:  none               Labs Ordered and Resulted from Time of ED Arrival Up to the Time of Departure from the ED - No data to display    Assessments & Plan (with Medical Decision Making)   Doretha Fernandez is a 42 year old female who presents to the department for evaluation of chronic left-sided headache now associated with nausea and episode of emesis. Patient does not have typical signs/symptoms of subarachnoid hemorrhage, meningitis or infectious etiology. She has had multiple imaging studies performed over the past couple of years including CT, CTA, MRI, and MRA without identifiable anatomical pathology. Initially attempted to start an IV but referral access was difficult to obtain. She instead received IM and oral medications and her symptoms dramatically improved. She subsequent tolerated oral fluids well by mouth, repeat abdominal examination remains benign.    Clinical impression is that her symptoms are likely related to chronic migraine headaches for which she suffers. Recommend that she take recently prescribed medications as directed and monitor closely for improvement. Prior to discharge, I made it clear that illness can unexpectedly develop/progress so she has been instructed to return to the emergency department for reevaluation of evolving symptoms, change in severity, intractable pain, fever, or other concerns. She seems comfortable with the discharge plan we discussed including follow up with neurology clinic.    Disclaimer: This note consists of symbols derived from keyboarding, dictation, and/or voice recognition software. As a result, there may be errors in the script that have gone undetected.  Please consider this when interpreting information found in the chart.        I have reviewed the nursing notes.    I have reviewed the findings, diagnosis, plan and need for follow up with the patient.       New Prescriptions    ONDANSETRON (ZOFRAN ODT) 4 MG ODT TAB    Take 1-2  tablets (4-8 mg) by mouth every 8 hours as needed for nausea       Final diagnoses:   Headache, unspecified headache type   Nausea       9/28/2017   [facility] EMERGENCY DEPARTMENT     Mauro Swanson DO  09/28/17 1931

## 2017-09-28 NOTE — ED NOTES
"Pt arrives hysterical to triage with complaints of HA. Reports she stopped her migraine medicine last week, went into neurologist and \" I told him over and over I didn't feel right, and something was wrong, and now when I left there I started vomiting, and it hurts so bad\" Pt screaming in pain, yelling loudly. Support given.   "

## 2017-09-28 NOTE — ED NOTES
Resting comfortably. Pain is comfortably tolerable, no needs currently. Call light within reach. Pt encouraged to call if anything changes or other needs arise.

## 2017-09-28 NOTE — LETTER
"2017      RE: Doretha Fernandez  [address] [place] [address]       2017         Anna Naidu MD   [facility]   [address] [place]  [address]      RE: Doretha Fernandez   MRN: [id number]   : 1975      Dear Dr. Naidu:      This is in regard to followup on Doretha Fernandez. The patient requested urgent consultation today because of worsening headaches.  This is her chief complaint.      The patient was accompanied by her stepmother, Eunice.  The patient has had a worsening headache.  She feels \"miserable.\"  She said that she decided to stop her medication on Friday because she was \"hungover.\"  Despite stopping the medication, she still has that feeling.  She stopped topiramate, gabapentin and Wellbutrin.  She had gotten up to the 200 mg dose of topiramate and she had no benefit at all from the drug.  The patient did tell me that she just \"does not feel right.\"  It was somewhat hard for her to describe this.  She is quite tearful during the interview.  The patient did discover recently what she described as a \"lump\" in the left axilla.  She is scheduled now next Wednesday for an ultrasound that you ordered and also a mammogram.  As you are aware, she lost her mother to cancer and this has been extremely stressful to her as well as to her father.  The patient did tell me that she is happy with her Mohs procedure involving her eyelid.  She was to see Dr. Norton again, the neuro-ophthalmologist, but she had to reschedule because she just didn't feel well.  She said her headache continues to start in the left occipital area and then winds around the entire head and her whole head now hurts.  She can be nauseated with the headache.  She also said that her neck hurts with the headache.  She has noted a burning sensation that has not left since stopping topiramate involving her entire body.  She said this is not as bad as it had been.  The patient did " "discuss with me her stress issues including her mother dying at 45.  She still has her ex- in her life and that is an issue.  You are aware of all of this.  Eunice said that she felt that Doretha had a \"jam-packed life\" and had no time for recuperation.  She is scheduled to see a psychiatrist now for the first time who can prescribe medication on 10/12.  The patient did go to the emergency room on 09/12/2017.  I reviewed that with them.  She did have more tests done.  The patient specifically had a CT scan of the head with and without contrast.  This was compared to the CT angiogram on 04/14/2017.  This was normal.  The patient also had a normal CT venogram of her head.  The patient did have, in addition, a CT scan of the face without contrast.  The patient had a 2 cm cyst in the right maxillary sinus.  Otherwise, there were no abnormalities noted except for paranasal sinus mucosal thickening.  She had a sedimentation rate of 14 and a CRP of 6.6.  The patient was given IV fluids along with Benadryl, Reglan, Toradol and Decadron.  Her records indicate that she had improved.  The patient said this was not correct and she feels that she has had no benefit from that evaluation.      The patient did go over with me all of her scans in recent years.  She has had approximately 5 CT scans of the head and 5 MRI-MRA studies.  I reviewed these with them.  I am aware that the patient has had a small occipital stroke in the setting of pregnancy.  She does have a coagulopathy.  The patient's last MRI scan was done in 2016.      The patient did tell me that she had gotten up to 1200 mg of gabapentin but never took extra medication for headache itself.  I had talked with her earlier about taking up to 600 mg as an additional dose to treat her pain.  She admitted to me that she only stopped the use of caffeinated coffee a week ago.  I talked with her at length in the past about analgesic rebound.  She has been using Tylenol " "daily for her headache.  The patient has not had any obvious fever.  She denied any systemic symptoms other than just her feeling of \"not being well.\"  The patient did describe this headache as a 10/10.  She did review with me allergies, and she is not allergic to sulfa.  She currently is off all of her medications except for fluoxetine 60 mg.      Neurologic examination revealed a tearful woman who appears younger than stated age.  Her initial blood pressure was 125/90 with a pulse 77 with the machine done by a medical assistant.  I rechecked it with a soft cuff, and it was 112/70 with a pulse of 72.  Otherwise, gait, station, cerebellar testing, muscle stretch reflexes, plantar stimulation, strength, cranial nerve examination with tenderness involving both occipital notches, superficial and cortical sensory testing are unremarkable.  She had a supple neck.  I could not auscultate cervical bruits.  She had a regular cardiac rhythm without gallops or murmurs.      IMPRESSION:  Chronic daily migraine.      The patient has chronic daily migraine.  She has had a basal cell cancer removed from her eyelid.  She also has now developed evidently a mass in her left axilla.  I went over all the tests that have been done over the last few years.  I did recommend that she have a current complete blood count, chemistry profile, vitamin D and thyroid studies.  One of her major complaints to me today was that she is terribly fatigued.  The patient is going to get an MRI scan of her brain with and without contrast because of her prior history of stroke and her more recent history of malignancy.  I have talked with her about taking extra gabapentin up to 600 mg as a single dose to treat headache pain.  I reminded her about analgesic rebound, and my assistant went over the same with her.  I did suggest she now start zonisamide 25 mg to be gradually increased weekly by 25 mg up to a dose of approximately 125-150 mg.  I went over " potential side effects of the drug with her including rash with possible Mallory-Shlomo syndrome,  irritability, nausea, as well as renal lithiasis.  I also reviewed metabolic acidosis and dehydration.  I stressed the importance of drinking at least 6-8 glasses of fluid a day.  I did remind the patient it may take a number of weeks for her headache to go away, but usually this drug has been helpful in my patients.  I did ask that the patient now consider Botox treatment for her headaches.  I have asked that she see me at [facility] in the next 4 weeks and on a p.r.n. basis.      I spent 45 minutes with the patient today.  Over 50% of the time did involve counseling and coordination of care.  Thank you for allowing me to see this patient.      Sincerely yours,      Bairon Miranda MD             D: 2017 13:50   T: 10/01/2017 09:33   MT: [email or web address]      Name:     ELIZABETH MOREIRA   MRN:      [id number]        Account:      [id number]   :      1975           Service Date: 2017      Document: [id number]

## 2017-09-28 NOTE — ED AVS SNAPSHOT
[facility] Emergency Department    [address] [address]    Phone:  [phone]    Fax:  [phone]                                       Doretha Fernandez   MRN: [id number]    Department:  [facility] Emergency Department   Date of Visit:  9/28/2017           Patient Information     Date Of Birth          1975        Your diagnoses for this visit were:     Headache, unspecified headache type     Nausea        You were seen by Mauro Swanson DO.      Follow-up Information     Follow up with Bairon Miranda MD. Call in 1 day.    Specialty:  Neurology    Why:  Followup for reevaluation and managment plan.    Contact information:    [facility] NEUROLOGY  [address] [place] [id number] [phone]        Discharge References/Attachments     HEADACHE, UNSPECIFIED (ENGLISH)      Future Appointments        Provider Department Dept Phone Center    10/4/2017 12:30 PM [facility] MAMMO ROOM 1 [facility] Imaging [phone] [facility]    10/4/2017 1:00 PM [facility] ULTRASOUND RM 2 [facility] Ultrasound [phone] [facility]    10/4/2017 5:45 PM Lab [facility] Lab [phone] [facility]    10/4/2017 6:15 PM [facility] MRI ROOM 1 [facility] [phone] [facility]    10/12/2017 9:45 AM AMADA Aguilar CNS [facility] [phone] [facility]    11/15/2017 2:00 PM Bairon Miranda MD [facility] Neurology [phone] [facility]      24 Hour Appointment Hotline       To make an appointment at any [facility], call [phone] ([phone]). If you don't have a family doctor or clinic, we will help you find one. [facility] are conveniently located to serve the needs of you and your family.             Review of your medicines      START taking        Dose / Directions Last dose taken    ondansetron 4 MG ODT tab   Commonly known as:  ZOFRAN ODT   Dose:  4-8 mg   Quantity:  10 tablet        Take 1-2  tablets (4-8 mg) by mouth every 8 hours as needed for nausea   Refills:  0          Our records show that you are taking the medicines listed below. If these are incorrect, please call your family doctor or clinic.        Dose / Directions Last dose taken    aspirin 81 MG EC tablet   Dose:  81 mg        Take 81 mg by mouth daily   Refills:  0        buPROPion 150 MG 24 hr tablet   Commonly known as:  WELLBUTRIN XL   Dose:  150 mg   Quantity:  30 tablet        Take 1 tablet (150 mg) by mouth every morning   Refills:  1        FLUoxetine 20 MG capsule   Commonly known as:  PROzac   Quantity:  270 capsule        TAKE THREE CAPSULE BY MOUTH ONCE DAILY   Refills:  1        fluticasone 50 MCG/ACT spray   Commonly known as:  FLONASE   Dose:  2 spray   Quantity:  1 g        Spray 2 sprays into both nostrils daily   Refills:  3        gabapentin 300 MG capsule   Commonly known as:  NEURONTIN   Dose:  1200 mg   Quantity:  360 capsule        Take 4 capsules (1,200 mg) by mouth At Bedtime   Refills:  1        LORazepam 1 MG tablet   Commonly known as:  ATIVAN   Dose:  1 mg   Quantity:  30 tablet        Take 1 tablet (1 mg) by mouth every 8 hours as needed for anxiety   Refills:  0        * topiramate 25 MG tablet   Commonly known as:  TOPAMAX   Quantity:  180 tablet        Take 100mg at bedtime for 2 weeks, then 125mg  At hs   Refills:  3        * topiramate 50 MG tablet   Commonly known as:  TOPAMAX   Dose:  50 mg   Quantity:  270 tablet        Take 1 tablet (50 mg) by mouth 3 times daily   Refills:  3        TYLENOL PO   Dose:  1000 mg        Take 1,000 mg by mouth every 6 hours as needed for mild pain or fever   Refills:  0        * VALIUM PO        Refills:  0        * diazepam 10 MG tablet   Commonly known as:  VALIUM   Dose:  10 mg   Quantity:  1 tablet        Take 1 tablet (10 mg) by mouth every 6 hours as needed for anxiety or sleep Take 30-60 minutes before procedure.  Do not operate a vehicle after taking this  medication.   Refills:  0        zonisamide 25 MG capsule   Commonly known as:  Zonegran   Quantity:  90 capsule        Take 1 tablet (25 mg) once daily for 1 week, then 2 tablets once daily for 1 week, then 3 tablets once daily for 1 week, then 4 tablets once daily for 1 week.   Refills:  1        * Notice:  This list has 4 medication(s) that are the same as other medications prescribed for you. Read the directions carefully, and ask your doctor or other care provider to review them with you.            Prescriptions were sent or printed at these locations (1 Prescription)                   Other Prescriptions                Printed at Department/Unit printer (1 of 1)         ondansetron (ZOFRAN ODT) 4 MG ODT tab                Orders Needing Specimen Collection     Ordered          09/28/17 1526  CBC with platelets differential - STAT, Prio: STAT, Needs to be Collected     Scheduled Task Status   09/28/17 1527 Collect CBC with platelets differential Open   Order Class:  PCU Collect                09/28/17 1549  Comprehensive metabolic panel - STAT, Prio: STAT, Needs to be Collected     Scheduled Task Status   09/28/17 1549 Collect Comprehensive metabolic panel Open   Order Class:  PCU Collect                09/28/17 1549  Magnesium - STAT, Prio: STAT, Needs to be Collected     Scheduled Task Status   09/28/17 1549 Collect Magnesium Open   Order Class:  PCU Collect                09/28/17 1549  Alcohol ethyl - STAT, Prio: STAT, Needs to be Collected     Scheduled Task Status   09/28/17 1549 Collect Alcohol ethyl Open   Order Class:  PCU Collect                  Pending Results     No orders found from 9/26/2017 to 9/29/2017.            Pending Culture Results     No orders found from 9/26/2017 to 9/29/2017.            Pending Results Instructions     If you had any lab results that were not finalized at the time of your Discharge, you can call the ED Lab Result RN at [phone]. You will be contacted by this team  for any positive Lab results or changes in treatment. The nurses are available 7 days a week from 10A to 6:30P.  You can leave a message 24 hours per day and they will return your call.        Test Results From Your Hospital Stay               Thank you for choosing [facility]       Thank you for choosing [facility] for your care. Our goal is always to provide you with excellent care. Hearing back from our patients is one way we can continue to improve our services. Please take a few minutes to complete the written survey that you may receive in the mail after you visit with us. Thank you!        [facility] Information     [facility] gives you secure access to your electronic health record. If you see a primary care provider, you can also send messages to your care team and make appointments. If you have questions, please call your primary care clinic.  If you do not have a primary care provider, please call [phone] and they will assist you.        Care EveryWhere ID     This is your Care EveryWhere ID. This could be used by other organizations to access your [facility] medical records  [id number]        Equal Access to Services     BRIGETTE QUARLES : Hadii aad ku hadasho Soayeshaali, waaxda luqadaha, qaybta kaalmada ademariah, pancho castillo. So [facility] [phone].    ATENCIÓN: Si habla español, tiene a mcdonnell disposición servicios gratuitos de asistencia lingüística. Llame al [phone].    We comply with applicable federal civil rights laws and [place] laws. We do not discriminate on the basis of race, color, national origin, age, disability sex, sexual orientation or gender identity.            After Visit Summary       This is your record. Keep this with you and show to your community pharmacist(s) and doctor(s) at your next visit.

## 2017-09-28 NOTE — MR AVS SNAPSHOT
After Visit Summary   9/28/2017    Doretha Fernandez    MRN: [id number]           Patient Information     Date Of Birth          1975        Visit Information        Provider Department      9/28/2017 11:30 AM Bairon Miranda MD [facility]        Today's Diagnoses     Tired    -  1    Headache disorder           Follow-ups after your visit        Additional Services     PHYSIATRY REFERRAL       Your provider has referred you to: [facility]: Physical Medicine [facility] Rehabilitation [facility] [phone]   [email or web address] Botox for intractable ha    Please be aware that coverage of these services is subject to the terms and limitations of your health insurance plan.  Call member services at your health plan with any benefit or coverage questions.      Please bring the following to your appointment:  >>   Any x-rays, CTs or MRIs which have been performed.  Contact the facility where they were done to arrange for  prior to your scheduled appointment.    >>   List of current medications   >>   This referral request   >>   Any documents/labs given to you for this referral                  Follow-up notes from your care team     Return in about 4 weeks (around 10/26/2017).      Your next 10 appointments already scheduled     Oct 04, 2017 12:30 PM CDT   [place] DIAGNOSTIC DIGITAL BILATERAL with [address] [facility] ([facility])    [address] [facility] [address] [phone]           Do not use any powder, lotion or deodorant under your arms or on your breast. If you do, we will ask you to remove it before your exam.  Wear comfortable, two-piece clothing.  If you have any allergies, tell your care team.  Bring any previous mammograms from other facilities or have them mailed to the breast center.  Three-dimensional (3D) mammograms are available at [place] locations in [facility],  "[place], [facility], [place], [place], and [place]. [facility] locations include [place] and [facility] in [place]. Benefits of 3D mammograms include: - Improved rate of cancer detection - Decreases your chance of having to go back for more tests, which means fewer: - \"False-positive\" results (This means that there is an abnormal area but it isn't cancer.) - Invasive testing procedures, such as a biopsy or surgery - Can provide clearer images of the breast if you have dense breast tissue. 3D mammography is an optional exam that anyone can have with a 2D mammogram. It doesn't replace or take the place of a 2D mammogram. 2D mammograms remain an effective screening test for all women.  Not all insurance companies cover the cost of a 3D mammogram. Check with your insurance.            Oct 04, 2017  1:00 PM CDT   US BREAST LEFT LIMITED 1-3 QUAD with [place]US2   [facility] ([facility])    [address] [facility] [address] [phone]           Please bring a list of your medicines (including vitamins, minerals and over-the-counter drugs). Also, tell your doctor about any allergies you may have. Wear comfortable clothes and leave your valuables at home.  You do not need to do anything special to prepare for your exam.  Please call the Imaging Department at your exam site with any questions.            Oct 04, 2017  5:45 PM CDT   LAB with [facility] ([facility])    [address] [facility]  1st Floor  [facility] [address] [phone]           Patient must bring picture ID. Patient should be prepared to give a urine specimen  Please do not eat 10-12 hours before your appointment if you are coming in fasting for labs on lipids, cholesterol, or glucose (sugar). Pregnant women should follow their Care Team instructions. Water with medications is okay. Do not drink coffee or other fluids. If you have concerns about " taking  your medications, please ask at office or if scheduling via [employer], send a message by clicking on Secure Messaging, Message Your Care Team.            Oct 04, 2017  6:15 PM CDT   (Arrive by 6:00 PM)   MR BRAIN W/O & W CONTRAST with [address] [facility] ([facility])    [address] [address] [phone]           Take your medicines as usual, unless your doctor tells you not to. Bring a list of your current medicines to your exam (including vitamins, minerals and over-the-counter drugs).  You will be given intravenous contrast for this exam. To prepare:   The day before your exam, drink extra fluids at least six 8-ounce glasses (unless your doctor tells you to restrict your fluids).   Have a blood test (creatinine test) within 30 days of your exam. Go to your clinic or Diagnostic Imaging Department for this test.  The MRI machine uses a strong magnet. Please wear clothes without metal (snaps, zippers). A sweatsuit works well, or we may give you a hospital gown.  Please remove any body piercings and hair extensions before you arrive. You will also remove watches, jewelry, hairpins, wallets, dentures, partial dental plates and hearing aids. You may wear contact lenses, and you may be able to wear your rings. We have a safe place to keep your personal items, but it is safer to leave them at home.   **IMPORTANT** THE INSTRUCTIONS BELOW ARE ONLY FOR THOSE PATIENTS WHO HAVE BEEN TOLD THEY WILL RECEIVE SEDATION OR GENERAL ANESTHESIA DURING THEIR MRI PROCEDURE:  IF YOU WILL RECEIVE SEDATION (take medicine to help you relax during your exam):   You must get the medicine from your doctor before you arrive. Bring the medicine to the exam. Do not take it at home.   Arrive one hour early. Bring someone who can take you home after the test. Your medicine will make you sleepy. After the exam, you may not drive, take a bus or take a taxi by  yourself.   No eating 8 hours before your exam. You may have clear liquids up until 4 hours before your exam. (Clear liquids include water, clear tea, black coffee and fruit juice without pulp.)  IF YOU WILL RECEIVE ANESTHESIA (be asleep for your exam):   Arrive 1 1/2 hours early. Bring someone who can take you home after the test. You may not drive, take a bus or take a taxi by yourself.   No eating 8 hours before your exam. You may have clear liquids up until 4 hours before your exam. (Clear liquids include water, clear tea, black coffee and fruit juice without pulp.)  Please call the Imaging Department at your exam site with any questions.            Oct 12, 2017  9:45 AM CDT   New Visit with AMADA Carbajal [facility] ([facility])    [address] [place] [address]   [phone]            Nov 15, 2017  2:00 PM CST   (Arrive by 1:45 PM)   Return Visit with Bairon Miranda MD   [facility] ([facility])    [address] [place] [address] [phone]              Future tests that were ordered for you today     Open Future Orders        Priority Expected Expires Ordered    MR Head w/o & w Contrast (MRI) Routine 9/28/2017 9/28/2018 9/28/2017    TSH Routine 10/3/2017 9/28/2018 9/28/2017    T4 free Routine 10/3/2017 9/28/2018 9/28/2017    Vitamin D Deficiency Routine 10/3/2017 9/28/2018 9/28/2017    CBC with platelets differential Routine 10/3/2017 9/28/2018 9/28/2017    Comprehensive metabolic panel Routine 10/3/2017 9/28/2018 9/28/2017    MA Diagnostic Digital Bilateral Routine  9/27/2018 9/27/2017    US Breast Left Limited 1-3 Quadrants Routine  9/27/2018 9/27/2017            Who to contact     Please call your clinic at [phone] to:    Ask questions about your health    Make or cancel appointments    Discuss your medicines    Learn about your test results    Speak to your doctor    If you have compliments or concerns about an experience at your clinic, or if you wish to file a complaint, please contact [facility] Patient Relations at [phone] or email us at [email or web address]         Additional Information About Your Visit        MyChart Information     [employer] gives you secure access to your electronic health record. If you see a primary care provider, you can also send messages to your care team and make appointments. If you have questions, please call your primary care clinic.  If you do not have a primary care provider, please call [phone] and they will assist you.      [employer] is an electronic gateway that provides easy, online access to your medical records. With [employer], you can request a clinic appointment, read your test results, renew a prescription or communicate with your care team.     To access your existing account, please contact your [facility] or call [phone] for assistance.        Care EveryWhere ID     This is your Care EveryWhere ID. This could be used by other organizations to access your [facility] medical records  [id number]        Your Vitals Were     Pulse Respirations                77 18           Blood Pressure from Last 3 Encounters:   09/28/17 (!) 125/98   09/12/17 96/52   09/06/17 107/76    Weight from Last 3 Encounters:   09/12/17 208 lb (94.3 kg)   09/06/17 208 lb 12.8 oz (94.7 kg)   07/25/17 209 lb (94.8 kg)              We Performed the Following     PHYSIATRY REFERRAL          Today's Medication Changes          These changes are accurate as of: 9/28/17  1:17 PM.  If you have any questions, ask your nurse or doctor.               Start taking these medicines.        Dose/Directions    zonisamide 25 MG capsule   Commonly known as:  Zonegran   Used for:  Headache disorder   Started by:  Bairon Miranda MD        Take 1 tablet (25 mg) once daily for 1 week, then 2  tablets once daily for 1 week, then 3 tablets once daily for 1 week, then 4 tablets once daily for 1 week.   Quantity:  90 capsule   Refills:  1         These medicines have changed or have updated prescriptions.        Dose/Directions    * topiramate 25 MG tablet   Commonly known as:  TOPAMAX   This may have changed:    - how much to take  - additional instructions   Used for:  Intractable chronic migraine without aura and without status migrainosus   Changed by:  Bairon Miranda MD        Take 100mg at bedtime for 2 weeks, then 125mg  At hs   Quantity:  180 tablet   Refills:  3       * topiramate 50 MG tablet   Commonly known as:  TOPAMAX   This may have changed:  Another medication with the same name was changed. Make sure you understand how and when to take each.   Used for:  Vision changes, Intractable migraine with aura with status migrainosus   Changed by:  Ade Gray RN        Dose:  50 mg   Take 1 tablet (50 mg) by mouth 3 times daily   Quantity:  270 tablet   Refills:  3       * VALIUM PO   This may have changed:  Another medication with the same name was added. Make sure you understand how and when to take each.        Refills:  0       * diazepam 10 MG tablet   Commonly known as:  VALIUM   This may have changed:  You were already taking a medication with the same name, and this prescription was added. Make sure you understand how and when to take each.   Used for:  Headache disorder   Changed by:  Bairon Miranda MD        Dose:  10 mg   Take 1 tablet (10 mg) by mouth every 6 hours as needed for anxiety or sleep Take 30-60 minutes before procedure.  Do not operate a vehicle after taking this medication.   Quantity:  1 tablet   Refills:  0       * Notice:  This list has 4 medication(s) that are the same as other medications prescribed for you. Read the directions carefully, and ask your doctor or other care provider to review them with you.         Where to get your medicines       Some of these will need a paper prescription and others can be bought over the counter.  Ask your nurse if you have questions.     Bring a paper prescription for each of these medications     diazepam 10 MG tablet    zonisamide 25 MG capsule                Primary Care Provider Office Phone # Fax #    Anna Naidu [phone] [id number]  [facility] [address]        Equal Access to Services     [facility] : Hadii aad ku hadasho Soomaali, waaxda luqadaha, qaybta kaalmada adeegyada, waxay idiin hayaan adeeg kharash la'aan . So [facility] [phone].    ATENCIÓN: Si habla esplety, tiene a mcdonnell disposición servicios gratuitos de asistencia lingüística. Liame al [phone].    We comply with applicable federal civil rights laws and [place] laws. We do not discriminate on the basis of race, color, national origin, age, disability sex, sexual orientation or gender identity.            Thank you!     Thank you for choosing [facility]  for your care. Our goal is always to provide you with excellent care. Hearing back from our patients is one way we can continue to improve our services. Please take a few minutes to complete the written survey that you may receive in the mail after your visit with us. Thank you!             Your Updated Medication List - Protect others around you: Learn how to safely use, store and throw away your medicines at www.disposemymeds.org.          This list is accurate as of: 9/28/17  1:17 PM.  Always use your most recent med list.                   Brand Name Dispense Instructions for use Diagnosis    aspirin 81 MG EC tablet      Take 81 mg by mouth daily        buPROPion 150 MG 24 hr tablet    WELLBUTRIN XL    30 tablet    Take 1 tablet (150 mg) by mouth every morning    Mild major depression (H)       FLUoxetine 20 MG capsule    PROzac    270 capsule    TAKE THREE CAPSULE BY MOUTH ONCE DAILY    Major depressive  disorder, single episode, mild (H)       fluticasone 50 MCG/ACT spray    FLONASE    1 g    Spray 2 sprays into both nostrils daily    Seasonal allergic rhinitis       gabapentin 300 MG capsule    NEURONTIN    360 capsule    Take 4 capsules (1,200 mg) by mouth At Bedtime    Acute non intractable tension-type headache       LORazepam 1 MG tablet    ATIVAN    30 tablet    Take 1 tablet (1 mg) by mouth every 8 hours as needed for anxiety    Anxiety, Acute intractable tension-type headache       * topiramate 25 MG tablet    TOPAMAX    180 tablet    Take 100mg at bedtime for 2 weeks, then 125mg  At hs    Intractable chronic migraine without aura and without status migrainosus       * topiramate 50 MG tablet    TOPAMAX    270 tablet    Take 1 tablet (50 mg) by mouth 3 times daily    Vision changes, Intractable migraine with aura with status migrainosus       TYLENOL PO      Take 1,000 mg by mouth every 6 hours as needed for mild pain or fever        * VALIUM PO           * diazepam 10 MG tablet    VALIUM    1 tablet    Take 1 tablet (10 mg) by mouth every 6 hours as needed for anxiety or sleep Take 30-60 minutes before procedure.  Do not operate a vehicle after taking this medication.    Headache disorder       zonisamide 25 MG capsule    Zonegran    90 capsule    Take 1 tablet (25 mg) once daily for 1 week, then 2 tablets once daily for 1 week, then 3 tablets once daily for 1 week, then 4 tablets once daily for 1 week.    Headache disorder       * Notice:  This list has 4 medication(s) that are the same as other medications prescribed for you. Read the directions carefully, and ask your doctor or other care provider to review them with you.

## 2017-09-28 NOTE — LETTER
"2017       RE: Doretha Fernandez  [address] [place] [address]     Dear Colleague,    Thank you for referring your patient, Doretha Fernandez, to the [facility] at [facility]. Please see a copy of my visit note below.    2017         Anna Naidu MD   [facility]   [address] [place]  [address]      RE: Doretha Fernandez   MRN: [id number]   : 1975      Dear Dr. Naidu:      This is in regard to followup on Doretha Fernandez. The patient requested urgent consultation today because of worsening headaches.  This is her chief complaint.      The patient was accompanied by her stepmother, Eunice.  The patient has had a worsening headache.  She feels \"miserable.\"  She said that she decided to stop her medication on Friday because she was \"hungover.\"  Despite stopping the medication, she still has that feeling.  She stopped topiramate, gabapentin and Wellbutrin.  She had gotten up to the 200 mg dose of topiramate and she had no benefit at all from the drug.  The patient did tell me that she just \"does not feel right.\"  It was somewhat hard for her to describe this.  She is quite tearful during the interview.  The patient did discover recently what she described as a \"lump\" in the left axilla.  She is scheduled now next Wednesday for an ultrasound that you ordered and also a mammogram.  As you are aware, she lost her mother to cancer and this has been extremely stressful to her as well as to her father.  The patient did tell me that she is happy with her Mohs procedure involving her eyelid.  She was to see Dr. Norton again, the neuro-ophthalmologist, but she had to reschedule because she just didn't feel well.  She said her headache continues to start in the left occipital area and then winds around the entire head and her whole head now hurts.  She can be nauseated with the " "headache.  She also said that her neck hurts with the headache.  She has noted a burning sensation that has not left since stopping topiramate involving her entire body.  She said this is not as bad as it had been.  The patient did discuss with me her stress issues including her mother dying at 45.  She still has her ex- in her life and that is an issue.  You are aware of all of this.  Eunice said that she felt that Doretha had a \"jam-packed life\" and had no time for recuperation.  She is scheduled to see a psychiatrist now for the first time who can prescribe medication on 10/12.  The patient did go to the emergency room on 09/12/2017.  I reviewed that with them.  She did have more tests done.  The patient specifically had a CT scan of the head with and without contrast.  This was compared to the CT angiogram on 04/14/2017.  This was normal.  The patient also had a normal CT venogram of her head.  The patient did have, in addition, a CT scan of the face without contrast.  The patient had a 2 cm cyst in the right maxillary sinus.  Otherwise, there were no abnormalities noted except for paranasal sinus mucosal thickening.  She had a sedimentation rate of 14 and a CRP of 6.6.  The patient was given IV fluids along with Benadryl, Reglan, Toradol and Decadron.  Her records indicate that she had improved.  The patient said this was not correct and she feels that she has had no benefit from that evaluation.      The patient did go over with me all of her scans in recent years.  She has had approximately 5 CT scans of the head and 5 MRI-MRA studies.  I reviewed these with them.  I am aware that the patient has had a small occipital stroke in the setting of pregnancy.  She does have a coagulopathy.  The patient's last MRI scan was done in 2016.      The patient did tell me that she had gotten up to 1200 mg of gabapentin but never took extra medication for headache itself.  I had talked with her earlier about taking up " "to 600 mg as an additional dose to treat her pain.  She admitted to me that she only stopped the use of caffeinated coffee a week ago.  I talked with her at length in the past about analgesic rebound.  She has been using Tylenol daily for her headache.  The patient has not had any obvious fever.  She denied any systemic symptoms other than just her feeling of \"not being well.\"  The patient did describe this headache as a 10/10.  She did review with me allergies, and she is not allergic to sulfa.  She currently is off all of her medications except for fluoxetine 60 mg.      Neurologic examination revealed a tearful woman who appears younger than stated age.  Her initial blood pressure was 125/90 with a pulse 77 with the machine done by a medical assistant.  I rechecked it with a soft cuff, and it was 112/70 with a pulse of 72.  Otherwise, gait, station, cerebellar testing, muscle stretch reflexes, plantar stimulation, strength, cranial nerve examination with tenderness involving both occipital notches, superficial and cortical sensory testing are unremarkable.  She had a supple neck.  I could not auscultate cervical bruits.  She had a regular cardiac rhythm without gallops or murmurs.      IMPRESSION:  Chronic daily migraine.      The patient has chronic daily migraine.  She has had a basal cell cancer removed from her eyelid.  She also has now developed evidently a mass in her left axilla.  I went over all the tests that have been done over the last few years.  I did recommend that she have a current complete blood count, chemistry profile, vitamin D and thyroid studies.  One of her major complaints to me today was that she is terribly fatigued.  The patient is going to get an MRI scan of her brain with and without contrast because of her prior history of stroke and her more recent history of malignancy.  I have talked with her about taking extra gabapentin up to 600 mg as a single dose to treat headache pain.  I " reminded her about analgesic rebound, and my assistant went over the same with her.  I did suggest she now start zonisamide 25 mg to be gradually increased weekly by 25 mg up to a dose of approximately 125-150 mg.  I went over potential side effects of the drug with her including rash with possible Mallory-Shlomo syndrome,  irritability, nausea, as well as renal lithiasis.  I also reviewed metabolic acidosis and dehydration.  I stressed the importance of drinking at least 6-8 glasses of fluid a day.  I did remind the patient it may take a number of weeks for her headache to go away, but usually this drug has been helpful in my patients.  I did ask that the patient now consider Botox treatment for her headaches.  I have asked that she see me at [facility] in the next 4 weeks and on a p.r.n. basis.      I spent 45 minutes with the patient today.  Over 50% of the time did involve counseling and coordination of care.  Thank you for allowing me to see this patient.      Sincerely yours,      MD CRISTINA Pierce MD             D: 2017 13:50   T: 10/01/2017 09:33   MT: [email or web address]      Name:     ELIZABETH MOREIRA   MRN:      [id number]        Account:      [id number]   :      1975           Service Date: 2017      Document: [id number]       Again, thank you for allowing me to participate in the care of your patient.      Sincerely,    Cristina Miranda MD

## 2017-09-28 NOTE — ED AVS SNAPSHOT
[facility] Emergency Department    [address] [address]    Phone:  [phone]    Fax:  [phone]                                       Doretha Fernandez   MRN: [id number]    Department:  [facility] Emergency Department   Date of Visit:  9/28/2017           After Visit Summary Signature Page     I have received my discharge instructions, and my questions have been answered. I have discussed any challenges I see with this plan with the nurse or doctor.    ..........................................................................................................................................  Patient/Patient Representative Signature      ..........................................................................................................................................  Patient Representative Print Name and Relationship to Patient    ..................................................               ................................................  Date                                            Time    ..........................................................................................................................................  Reviewed by Signature/Title    ...................................................              ..............................................  Date                                                            Time

## 2017-09-28 NOTE — ED NOTES
"Onset of HA since April pt just left Neurologist office today was given two 2 med's, pt stopped taking Wellbutrin, Topamax and gabapentin last week \" they were making me sick \" pt is tearful, got in her car to go home and \" I threw up everywhere \" pain is steady, denies vision changes, pt is photosensitive, pt arrives to the ED by herself.  "

## 2017-10-01 NOTE — PROGRESS NOTES
"2017         Anna Naidu MD   [facility]   [address] [place]  [address]      RE: Doretha Fernandez   MRN: [id number]   : 1975      Dear Dr. Naidu:      This is in regard to followup on Doretha Fernandez. The patient requested urgent consultation today because of worsening headaches.  This is her chief complaint.      The patient was accompanied by her stepmother, Eunice.  The patient has had a worsening headache.  She feels \"miserable.\"  She said that she decided to stop her medication on Friday because she was \"hungover.\"  Despite stopping the medication, she still has that feeling.  She stopped topiramate, gabapentin and Wellbutrin.  She had gotten up to the 200 mg dose of topiramate and she had no benefit at all from the drug.  The patient did tell me that she just \"does not feel right.\"  It was somewhat hard for her to describe this.  She is quite tearful during the interview.  The patient did discover recently what she described as a \"lump\" in the left axilla.  She is scheduled now next Wednesday for an ultrasound that you ordered and also a mammogram.  As you are aware, she lost her mother to cancer and this has been extremely stressful to her as well as to her father.  The patient did tell me that she is happy with her Mohs procedure involving her eyelid.  She was to see Dr. Norton again, the neuro-ophthalmologist, but she had to reschedule because she just didn't feel well.  She said her headache continues to start in the left occipital area and then winds around the entire head and her whole head now hurts.  She can be nauseated with the headache.  She also said that her neck hurts with the headache.  She has noted a burning sensation that has not left since stopping topiramate involving her entire body.  She said this is not as bad as it had been.  The patient did discuss with me her stress issues including her mother dying at 45.  She still has her " "ex- in her life and that is an issue.  You are aware of all of this.  Eunice said that she felt that Doretha had a \"jam-packed life\" and had no time for recuperation.  She is scheduled to see a psychiatrist now for the first time who can prescribe medication on 10/12.  The patient did go to the emergency room on 09/12/2017.  I reviewed that with them.  She did have more tests done.  The patient specifically had a CT scan of the head with and without contrast.  This was compared to the CT angiogram on 04/14/2017.  This was normal.  The patient also had a normal CT venogram of her head.  The patient did have, in addition, a CT scan of the face without contrast.  The patient had a 2 cm cyst in the right maxillary sinus.  Otherwise, there were no abnormalities noted except for paranasal sinus mucosal thickening.  She had a sedimentation rate of 14 and a CRP of 6.6.  The patient was given IV fluids along with Benadryl, Reglan, Toradol and Decadron.  Her records indicate that she had improved.  The patient said this was not correct and she feels that she has had no benefit from that evaluation.      The patient did go over with me all of her scans in recent years.  She has had approximately 5 CT scans of the head and 5 MRI-MRA studies.  I reviewed these with them.  I am aware that the patient has had a small occipital stroke in the setting of pregnancy.  She does have a coagulopathy.  The patient's last MRI scan was done in 2016.      The patient did tell me that she had gotten up to 1200 mg of gabapentin but never took extra medication for headache itself.  I had talked with her earlier about taking up to 600 mg as an additional dose to treat her pain.  She admitted to me that she only stopped the use of caffeinated coffee a week ago.  I talked with her at length in the past about analgesic rebound.  She has been using Tylenol daily for her headache.  The patient has not had any obvious fever.  She denied any " "systemic symptoms other than just her feeling of \"not being well.\"  The patient did describe this headache as a 10/10.  She did review with me allergies, and she is not allergic to sulfa.  She currently is off all of her medications except for fluoxetine 60 mg.      Neurologic examination revealed a tearful woman who appears younger than stated age.  Her initial blood pressure was 125/90 with a pulse 77 with the machine done by a medical assistant.  I rechecked it with a soft cuff, and it was 112/70 with a pulse of 72.  Otherwise, gait, station, cerebellar testing, muscle stretch reflexes, plantar stimulation, strength, cranial nerve examination with tenderness involving both occipital notches, superficial and cortical sensory testing are unremarkable.  She had a supple neck.  I could not auscultate cervical bruits.  She had a regular cardiac rhythm without gallops or murmurs.      IMPRESSION:  Chronic daily migraine.      The patient has chronic daily migraine.  She has had a basal cell cancer removed from her eyelid.  She also has now developed evidently a mass in her left axilla.  I went over all the tests that have been done over the last few years.  I did recommend that she have a current complete blood count, chemistry profile, vitamin D and thyroid studies.  One of her major complaints to me today was that she is terribly fatigued.  The patient is going to get an MRI scan of her brain with and without contrast because of her prior history of stroke and her more recent history of malignancy.  I have talked with her about taking extra gabapentin up to 600 mg as a single dose to treat headache pain.  I reminded her about analgesic rebound, and my assistant went over the same with her.  I did suggest she now start zonisamide 25 mg to be gradually increased weekly by 25 mg up to a dose of approximately 125-150 mg.  I went over potential side effects of the drug with her including rash with possible Mallory-Shlomo " syndrome,  irritability, nausea, as well as renal lithiasis.  I also reviewed metabolic acidosis and dehydration.  I stressed the importance of drinking at least 6-8 glasses of fluid a day.  I did remind the patient it may take a number of weeks for her headache to go away, but usually this drug has been helpful in my patients.  I did ask that the patient now consider Botox treatment for her headaches.  I have asked that she see me at [facility] in the next 4 weeks and on a p.r.n. basis.      I spent 45 minutes with the patient today.  Over 50% of the time did involve counseling and coordination of care.  Thank you for allowing me to see this patient.      Sincerely yours,      MD CRISTINA Pierce MD             D: 2017 13:50   T: 10/01/2017 09:33   MT: [email or web address]      Name:     ELIZABETH MOREIRA   MRN:      [id number]        Account:      [id number]   :      1975           Service Date: 2017      Document: [id number]

## 2017-10-04 ENCOUNTER — HOSPITAL ENCOUNTER (OUTPATIENT)
Dept: ULTRASOUND IMAGING | Facility: CLINIC | Age: 42
End: 2017-10-04
Attending: FAMILY MEDICINE
Payer: COMMERCIAL

## 2017-10-04 ENCOUNTER — HOSPITAL ENCOUNTER (OUTPATIENT)
Dept: MAMMOGRAPHY | Facility: CLINIC | Age: 42
Discharge: HOME | End: 2017-10-04
Attending: FAMILY MEDICINE | Admitting: FAMILY MEDICINE
Payer: COMMERCIAL

## 2017-10-04 DIAGNOSIS — R22.32 MASS OF LEFT AXILLA: ICD-10-CM

## 2017-10-04 DIAGNOSIS — R53.83 TIRED: ICD-10-CM

## 2017-10-04 LAB
ALBUMIN SERPL-MCNC: 3.7 G/DL (ref 3.4–5)
ALP SERPL-CCNC: 62 U/L (ref 40–150)
ALT SERPL W P-5'-P-CCNC: 30 U/L (ref 0–50)
ANION GAP SERPL CALCULATED.3IONS-SCNC: 8 MMOL/L (ref 3–14)
AST SERPL W P-5'-P-CCNC: 9 U/L (ref 0–45)
BASOPHILS # BLD AUTO: 0 10E9/L (ref 0–0.2)
BASOPHILS NFR BLD AUTO: 0.3 %
BILIRUB SERPL-MCNC: 0.2 MG/DL (ref 0.2–1.3)
BUN SERPL-MCNC: 12 MG/DL (ref 7–30)
CALCIUM SERPL-MCNC: 9.1 MG/DL (ref 8.5–10.1)
CHLORIDE SERPL-SCNC: 107 MMOL/L (ref 94–109)
CO2 SERPL-SCNC: 26 MMOL/L (ref 20–32)
CREAT SERPL-MCNC: 0.92 MG/DL (ref 0.52–1.04)
DIFFERENTIAL METHOD BLD: NORMAL
EOSINOPHIL # BLD AUTO: 0.1 10E9/L (ref 0–0.7)
EOSINOPHIL NFR BLD AUTO: 1.4 %
ERYTHROCYTE [DISTWIDTH] IN BLOOD BY AUTOMATED COUNT: 12.7 % (ref 10–15)
GFR SERPL CREATININE-BSD FRML MDRD: 67 ML/MIN/1.7M2
GLUCOSE SERPL-MCNC: 100 MG/DL (ref 70–99)
HCT VFR BLD AUTO: 35.9 % (ref 35–47)
HGB BLD-MCNC: 11.7 G/DL (ref 11.7–15.7)
LYMPHOCYTES # BLD AUTO: 2.4 10E9/L (ref 0.8–5.3)
LYMPHOCYTES NFR BLD AUTO: 31.6 %
MCH RBC QN AUTO: 29.3 PG (ref 26.5–33)
MCHC RBC AUTO-ENTMCNC: 32.6 G/DL (ref 31.5–36.5)
MCV RBC AUTO: 90 FL (ref 78–100)
MONOCYTES # BLD AUTO: 0.7 10E9/L (ref 0–1.3)
MONOCYTES NFR BLD AUTO: 9 %
NEUTROPHILS # BLD AUTO: 4.4 10E9/L (ref 1.6–8.3)
NEUTROPHILS NFR BLD AUTO: 57.7 %
PLATELET # BLD AUTO: 272 10E9/L (ref 150–450)
POTASSIUM SERPL-SCNC: 3.9 MMOL/L (ref 3.4–5.3)
PROT SERPL-MCNC: 7.2 G/DL (ref 6.8–8.8)
RBC # BLD AUTO: 4 10E12/L (ref 3.8–5.2)
SODIUM SERPL-SCNC: 141 MMOL/L (ref 133–144)
T4 FREE SERPL-MCNC: 0.9 NG/DL (ref 0.76–1.46)
TSH SERPL DL<=0.005 MIU/L-ACNC: 1.19 MU/L (ref 0.4–4)
WBC # BLD AUTO: 7.7 10E9/L (ref 4–11)

## 2017-10-04 PROCEDURE — 84439 ASSAY OF FREE THYROXINE: CPT | Performed by: PSYCHIATRY & NEUROLOGY

## 2017-10-04 PROCEDURE — 36415 COLL VENOUS BLD VENIPUNCTURE: CPT | Performed by: PSYCHIATRY & NEUROLOGY

## 2017-10-04 PROCEDURE — G0204 DX MAMMO INCL CAD BI: HCPCS

## 2017-10-04 PROCEDURE — 76882 US LMTD JT/FCL EVL NVASC XTR: CPT | Mod: LT

## 2017-10-04 PROCEDURE — 82306 VITAMIN D 25 HYDROXY: CPT | Performed by: PSYCHIATRY & NEUROLOGY

## 2017-10-04 PROCEDURE — 76642 ULTRASOUND BREAST LIMITED: CPT | Mod: LT

## 2017-10-04 PROCEDURE — 80050 GENERAL HEALTH PANEL: CPT | Performed by: PSYCHIATRY & NEUROLOGY

## 2017-10-05 ENCOUNTER — CARE COORDINATION (OUTPATIENT)
Dept: NEUROLOGY | Facility: CLINIC | Age: 42
End: 2017-10-05

## 2017-10-05 DIAGNOSIS — C43.9 METASTATIC MELANOMA (H): Primary | ICD-10-CM

## 2017-10-05 DIAGNOSIS — E55.9 VITAMIN D DEFICIENCY: Primary | ICD-10-CM

## 2017-10-05 LAB — DEPRECATED CALCIDIOL+CALCIFEROL SERPL-MC: 26 UG/L (ref 20–75)

## 2017-10-05 RX ORDER — GLUCOSAMINE HCL 500 MG
3000 TABLET ORAL DAILY
Qty: 30 TABLET | Refills: 3 | Status: SHIPPED | OUTPATIENT
Start: 2017-10-05 | End: 2018-04-19

## 2017-10-05 NOTE — PROGRESS NOTES
Bairon Miranda MD McAllister, Angela, TEJINDER                   Blood tests look fine except vit d slightly low , under 30; start OTC vit d 3 3000 units daily           10/5/17: called patient with dr. Miranda's message above. Patient verbalized understanding and agreement with plan. She would like the Vit D3 sent to her pharmacy. Prescription sent to Dr Miranda to authorize.

## 2017-10-06 ENCOUNTER — TELEPHONE (OUTPATIENT)
Dept: FAMILY MEDICINE | Facility: CLINIC | Age: 42
End: 2017-10-06

## 2017-10-06 DIAGNOSIS — C43.9 METASTATIC MALIGNANT MELANOMA (H): Primary | ICD-10-CM

## 2017-10-06 NOTE — TELEPHONE ENCOUNTER
Reason for Call:  Other call back    Detailed comments: patient is calling wanting her Breast Biopsy Results from yesterday. I informed the patient that the results are not done yet, as they are still in process. She wants to be notified with the results when they are in.    Phone Number Patient can be reached at: Cell number on file:    Telephone Information:   Mobile [phone]       Best Time: any    Can we leave a detailed message on this number? YES   Elisa Zapata  [facility] [place] [facility]      Call taken on 10/6/2017 at 9:00 AM by Elisa Zapata

## 2017-10-10 ENCOUNTER — TELEPHONE (OUTPATIENT)
Dept: FAMILY MEDICINE | Facility: CLINIC | Age: 42
End: 2017-10-10

## 2017-10-10 ENCOUNTER — OFFICE VISIT (OUTPATIENT)
Dept: DERMATOLOGY | Facility: CLINIC | Age: 42
End: 2017-10-10
Payer: COMMERCIAL

## 2017-10-10 VITALS — HEIGHT: 63 IN | DIASTOLIC BLOOD PRESSURE: 85 MMHG | SYSTOLIC BLOOD PRESSURE: 128 MMHG | HEART RATE: 93 BPM

## 2017-10-10 DIAGNOSIS — C43.9 METASTATIC MELANOMA (H): ICD-10-CM

## 2017-10-10 DIAGNOSIS — C43.9 METASTATIC MALIGNANT MELANOMA (H): Primary | ICD-10-CM

## 2017-10-10 DIAGNOSIS — L81.4 LENTIGO: ICD-10-CM

## 2017-10-10 DIAGNOSIS — L82.1 SK (SEBORRHEIC KERATOSIS): ICD-10-CM

## 2017-10-10 DIAGNOSIS — C43.9 METASTATIC MELANOMA (H): Primary | ICD-10-CM

## 2017-10-10 DIAGNOSIS — D48.5 NEOPLASM OF UNCERTAIN BEHAVIOR OF SKIN: ICD-10-CM

## 2017-10-10 DIAGNOSIS — D23.9 DERMAL NEVUS: Primary | ICD-10-CM

## 2017-10-10 PROCEDURE — 11100 HC BIOPSY SKIN/SUBQ/MUC MEM, SINGLE LESION: CPT | Performed by: DERMATOLOGY

## 2017-10-10 PROCEDURE — 99203 OFFICE O/P NEW LOW 30 MIN: CPT | Mod: 25 | Performed by: DERMATOLOGY

## 2017-10-10 PROCEDURE — 11101 HC BIOPSY SKIN/SUBQ/MUC MEM, EACH ADDTL LESION: CPT | Performed by: DERMATOLOGY

## 2017-10-10 PROCEDURE — 88305 TISSUE EXAM BY PATHOLOGIST: CPT | Performed by: DERMATOLOGY

## 2017-10-10 NOTE — NURSING NOTE
"Chief Complaint   Patient presents with     Derm Problem     melanoma       Vitals:    10/10/17 1249   BP: 128/85   Pulse: 93   Height: 1.6 m (5' 3\")     Wt Readings from Last 1 Encounters:   09/28/17 90.7 kg (200 lb)       Tammie Gil LPN.................10/10/2017    "

## 2017-10-10 NOTE — TELEPHONE ENCOUNTER
Called by pathologist at 1630, patient given results 10/9/2017.  Oncology apt set up for 10/12/2017 at 8 am.    10/10/2017   Talked with Dr. Bullock - he can see her today at 1pm for a full skin exam.  She will also need referral to surgical oncology for lymph node dissection.       Lymph Biopsy results show:     SPECIMEN(S):   Left axilla, 1 o'clock, 13 cm from nipple     FINAL DIAGNOSIS:   LYMPH NODE, LEFT AXILLA, 1:00, 13 CM FROM NIPPLE, ULTRASOUND-GUIDED   NEEDLE CORE BIOPSY:   - CONSISTENT WITH METASTATIC MALIGNANT MELANOMA, see comment.     COMMENT:   Sections show a malignant neoplasm composed of pleomorphic atypical   cells with vesicular nuclei, some bearing prominent nucleoli, arranged   in large nests. Mitoses are frequent and foci of necrosis are present.   Background lymphoid tissue is not identified.  A panel of   immunohistochemical stains are performed with appropriate control   reactions. The tumor cells are diffusely and strongly positive for S-100   and SOX-10, while only rare cells are positive for Melan-A. The lesional   cells are negative for HMB-45, tyrosinase, cytokeratin AE1/AE3, DESTINY-3,   p63, TTF-1 and .  Overall, the morphologic features and the   immunohistochemical profile are most consistent with metastatic   malignant melanoma. Molecular testing is in progress and the results   will be reported separately. The case was discussed with Dr. Naidu on   10/9/17.     I have personally reviewed all specimens and/or slides, including the   listed special stains, and used them with my medical judgement to   determine or confirm the final diagnosis.     Electronically signed out by:     Samantha Guzmán M.D., Physicians     CLINICAL HISTORY:

## 2017-10-10 NOTE — TELEPHONE ENCOUNTER
Dianna from the [facility] breast center at [facility] called to follow up on the path results.  Dr. Naidu has made referrals, appt with oncology, derm and PET scan made.  If we need any assistance setting up appointments for the patient we may call her at [phone] Dianna MARR at breast center at [facility].  Does she need assistance with the surgical oncology referral?    Thank you  Polly BAJWA RN

## 2017-10-10 NOTE — PATIENT INSTRUCTIONS
**Follow up with Medical Oncology, Surgical Oncology, Schedule appointment with Ophthalmology & Schedule a PET Scan  **Follow up with  in 3 months for skin check      Wound Care Instructions     FOR SUPERFICIAL WOUNDS     [facility] [phone]    [facility] [phone]    Back x 2 & left side                   AFTER 24 HOURS YOU SHOULD REMOVE THE BANDAGE AND BEGIN DAILY DRESSING CHANGES AS FOLLOWS:     1) Remove Dressing.     2) Clean and dry the area with tap water using a Q-tip or sterile gauze pad.     3) Apply Vaseline, Aquaphor, Polysporin ointment or Bacitracin ointment over entire wound.  Do NOT use Neosporin ointment.     4) Cover the wound with a band-aid, or a sterile non-stick gauze pad and micropore paper tape      REPEAT THESE INSTRUCTIONS AT LEAST ONCE A DAY UNTIL THE WOUND HAS COMPLETELY HEALED.    It is an old wives tale that a wound heals better when it is exposed to air and allowed to dry out. The wound will heal faster with a better cosmetic result if it is kept moist with ointment and covered with a bandage.    **Do not let the wound dry out.**      Supplies Needed:      *Cotton tipped applicators (Q-tips)    *Polysporin Ointment or Bacitracin Ointment (NOT NEOSPORIN)    *Band-aids or non-stick gauze pads and micropore paper tape.      PATIENT INFORMATION:    During the healing process you will notice a number of changes. All wounds develop a small halo of redness surrounding the wound.  This means healing is occurring. Severe itching with extensive redness usually indicates sensitivity to the ointment or bandage tape used to dress the wound.  You should call our office if this develops.      Swelling  and/or discoloration around your surgical site is common, particularly when performed around the eye.    All wounds normally drain.  The larger the wound the more drainage there will be.  After 7-10 days, you will notice the wound beginning to shrink and new  skin will begin to grow.  The wound is healed when you can see skin has formed over the entire area.  A healed wound has a healthy, shiny look to the surface and is red to dark pink in color to normalize.  Wounds may take approximately 4-6 weeks to heal.  Larger wounds may take 6-8 weeks.  After the wound is healed you may discontinue dressing changes.    You may experience a sensation of tightness as your wound heals. This is normal and will gradually subside.    Your healed wound may be sensitive to temperature changes. This sensitivity improves with time, but if you re having a lot of discomfort, try to avoid temperature extremes.    Patients frequently experience itching after their wound appears to have healed because of the continue healing under the skin.  Plain Vaseline will help relieve the itching.        POSSIBLE COMPLICATIONS    BLEEDIN. Leave the bandage in place.  2. Use tightly rolled up gauze or a cloth to apply direct pressure over the bandage for 30  minutes.  3. Reapply pressure for an additional 30 minutes if necessary  4. Use additional gauze and tape to maintain pressure once the bleeding has stopped.

## 2017-10-10 NOTE — MR AVS SNAPSHOT
After Visit Summary   10/10/2017    Doretha Fernandez    MRN: [id number]           Patient Information     Date Of Birth          1975        Visit Information        Provider Department      10/10/2017 1:00 PM Lowell Bullock MD [facility]        Care Instructions    **Follow up with Medical Oncology, Surgical Oncology, Schedule appointment with Ophthalmology & Schedule a PET Scan  **Follow up with  in 3 months for skin check      Wound Care Instructions     FOR SUPERFICIAL WOUNDS     [facility] [phone]    [facility] [phone]    Back x 2 & left side                   AFTER 24 HOURS YOU SHOULD REMOVE THE BANDAGE AND BEGIN DAILY DRESSING CHANGES AS FOLLOWS:     1) Remove Dressing.     2) Clean and dry the area with tap water using a Q-tip or sterile gauze pad.     3) Apply Vaseline, Aquaphor, Polysporin ointment or Bacitracin ointment over entire wound.  Do NOT use Neosporin ointment.     4) Cover the wound with a band-aid, or a sterile non-stick gauze pad and micropore paper tape      REPEAT THESE INSTRUCTIONS AT LEAST ONCE A DAY UNTIL THE WOUND HAS COMPLETELY HEALED.    It is an old wives tale that a wound heals better when it is exposed to air and allowed to dry out. The wound will heal faster with a better cosmetic result if it is kept moist with ointment and covered with a bandage.    **Do not let the wound dry out.**      Supplies Needed:      *Cotton tipped applicators (Q-tips)    *Polysporin Ointment or Bacitracin Ointment (NOT NEOSPORIN)    *Band-aids or non-stick gauze pads and micropore paper tape.      PATIENT INFORMATION:    During the healing process you will notice a number of changes. All wounds develop a small halo of redness surrounding the wound.  This means healing is occurring. Severe itching with extensive redness usually indicates sensitivity to the ointment or bandage tape used to dress the wound.  You should  call our office if this develops.      Swelling  and/or discoloration around your surgical site is common, particularly when performed around the eye.    All wounds normally drain.  The larger the wound the more drainage there will be.  After 7-10 days, you will notice the wound beginning to shrink and new skin will begin to grow.  The wound is healed when you can see skin has formed over the entire area.  A healed wound has a healthy, shiny look to the surface and is red to dark pink in color to normalize.  Wounds may take approximately 4-6 weeks to heal.  Larger wounds may take 6-8 weeks.  After the wound is healed you may discontinue dressing changes.    You may experience a sensation of tightness as your wound heals. This is normal and will gradually subside.    Your healed wound may be sensitive to temperature changes. This sensitivity improves with time, but if you re having a lot of discomfort, try to avoid temperature extremes.    Patients frequently experience itching after their wound appears to have healed because of the continue healing under the skin.  Plain Vaseline will help relieve the itching.        POSSIBLE COMPLICATIONS    BLEEDIN. Leave the bandage in place.  2. Use tightly rolled up gauze or a cloth to apply direct pressure over the bandage for 30  minutes.  3. Reapply pressure for an additional 30 minutes if necessary  4. Use additional gauze and tape to maintain pressure once the bleeding has stopped.            Follow-ups after your visit        Your next 10 appointments already scheduled     Oct 11, 2017  2:00 PM CDT   PE NPET ONCOLOGY (EYES TO THIGHS) with [id number]   [facility] Pet CT ([facility])    [address] [address]   [phone]           Tell your doctor:   If there is any chance you may be pregnant or if you are breastfeeding.   If you have problems lying in small spaces (claustrophobia). If you do, your doctor may give you  medicine to help you relax. If you have diabetes:   Have your exam early in the morning. Your blood glucose will go up as the day goes by.   Your glucose level must be 180 or less at the start of the exam. Please take any medicines you need to ensure this blood glucose level. 24 hours before your scan: Don t do any heavy exercise. (No jogging, aerobics or other workouts.) Exercise will make your pictures less accurate. 6 hours before your scan:   Stop all food and liquids (except water).   Do not chew gum or suck on mints.   If you need to take medicine with food, you may take it with a few crackers.  Please call your Imaging Department at your exam site with any questions.            Oct 12, 2017  8:00 AM CDT   Return Visit with Kathy Richards MD   [facility] ([facility])    [facility]  [address]  [facility] [address]   [phone]            Oct 12, 2017  9:45 AM CDT   New Visit with AMADA Carbajal CNS   [facility] ([facility]    [address] [address]   [phone]            Nov 15, 2017  2:00 PM CST   (Arrive by 1:45 PM)   Return Visit with Bairon Miranda MD   [facility] ([facility])    [address] [id number] [phone]              Future tests that were ordered for you today     Open Future Orders        Priority Expected Expires Ordered    PET Oncology (Eyes to Thighs) Routine 10/11/2017 10/11/2018 10/10/2017            Who to contact     If you have questions or need follow up information about today's clinic visit or your schedule please contact [facility] directly at [phone].  Normal or non-critical lab and imaging results will be communicated to you by MyChart, letter or phone within 4 business days after the clinic has received the results. If you do not hear from us  "within 7 days, please contact the clinic through [employer] or phone. If you have a critical or abnormal lab result, we will notify you by phone as soon as possible.  Submit refill requests through [employer] or call your pharmacy and they will forward the refill request to us. Please allow 3 business days for your refill to be completed.          Additional Information About Your Visit        [employer]enn [employer] Information     [employer] gives you secure access to your electronic health record. If you see a primary care provider, you can also send messages to your care team and make appointments. If you have questions, please call your primary care clinic.  If you do not have a primary care provider, please call [phone] and they will assist you.        Care EveryWhere ID     This is your Care EveryWhere ID. This could be used by other organizations to access your [facility] medical records  [id number]        Your Vitals Were     Pulse Height Last Period             93 1.6 m (5' 3\") 09/07/2017          Blood Pressure from Last 3 Encounters:   10/10/17 128/85   09/28/17 92/58   09/28/17 (!) 125/98    Weight from Last 3 Encounters:   09/28/17 90.7 kg (200 lb)   09/12/17 94.3 kg (208 lb)   09/06/17 94.7 kg (208 lb 12.8 oz)              Today, you had the following     No orders found for display         Today's Medication Changes          These changes are accurate as of: 10/10/17  1:40 PM.  If you have any questions, ask your nurse or doctor.               These medicines have changed or have updated prescriptions.        Dose/Directions    * topiramate 25 MG tablet   Commonly known as:  TOPAMAX   This may have changed:    - how much to take  - additional instructions   Used for:  Intractable chronic migraine without aura and without status migrainosus   Changed by:  Bairon Miranda MD        Take 100mg at bedtime for 2 weeks, then 125mg  At hs   Quantity:  180 tablet   Refills:  3       * topiramate 50 MG tablet   Commonly " known as:  TOPAMAX   This may have changed:  Another medication with the same name was changed. Make sure you understand how and when to take each.   Used for:  Vision changes, Intractable migraine with aura with status migrainosus   Changed by:  Ade Gray RN        Dose:  50 mg   Take 1 tablet (50 mg) by mouth 3 times daily   Quantity:  270 tablet   Refills:  3       * Notice:  This list has 2 medication(s) that are the same as other medications prescribed for you. Read the directions carefully, and ask your doctor or other care provider to review them with you.             Primary Care Provider Office Phone # Fax #    Anna Naidu [phone] [address] [address]        Equal Access to Services     BRIGETTE QUARLES : Hadii josy garnett hadasho Soayeshaali, waaxda luqadaha, qaybta kaalmada adeegyada, pancho moore . So [facility] [phone].    ATENCIÓN: Si habla español, tiene a mcdonnell disposición servicios gratuitos de asistencia lingüística. [facility] [phone].    We comply with applicable federal civil rights laws and [place] laws. We do not discriminate on the basis of race, color, national origin, age, disability, sex, sexual orientation, or gender identity.            Thank you!     Thank you for choosing [facility]  for your care. Our goal is always to provide you with excellent care. Hearing back from our patients is one way we can continue to improve our services. Please take a few minutes to complete the written survey that you may receive in the mail after your visit with us. Thank you!             Your Updated Medication List - Protect others around you: Learn how to safely use, store and throw away your medicines at www.disposemymeds.org.          This list is accurate as of: 10/10/17  1:40 PM.  Always use your most recent med list.                   Brand Name Dispense Instructions for use Diagnosis    aspirin 81 MG EC  tablet      Take 81 mg by mouth daily        buPROPion 150 MG 24 hr tablet    WELLBUTRIN XL    30 tablet    Take 1 tablet (150 mg) by mouth every morning    Mild major depression (H)       FLUoxetine 20 MG capsule    PROzac    270 capsule    TAKE THREE CAPSULE BY MOUTH ONCE DAILY    Major depressive disorder, single episode, mild (H)       fluticasone 50 MCG/ACT spray    FLONASE    1 g    Spray 2 sprays into both nostrils daily    Seasonal allergic rhinitis       gabapentin 300 MG capsule    NEURONTIN    360 capsule    Take 4 capsules (1,200 mg) by mouth At Bedtime    Acute non intractable tension-type headache       LORazepam 1 MG tablet    ATIVAN    30 tablet    Take 1 tablet (1 mg) by mouth every 8 hours as needed for anxiety    Anxiety, Acute intractable tension-type headache       * topiramate 25 MG tablet    TOPAMAX    180 tablet    Take 100mg at bedtime for 2 weeks, then 125mg  At hs    Intractable chronic migraine without aura and without status migrainosus       * topiramate 50 MG tablet    TOPAMAX    270 tablet    Take 1 tablet (50 mg) by mouth 3 times daily    Vision changes, Intractable migraine with aura with status migrainosus       TYLENOL PO      Take 1,000 mg by mouth every 6 hours as needed for mild pain or fever        * VALIUM PO           * diazepam 10 MG tablet    VALIUM    1 tablet    Take 1 tablet (10 mg) by mouth every 6 hours as needed for anxiety or sleep Take 30-60 minutes before procedure.  Do not operate a vehicle after taking this medication.    Headache disorder       Vitamin D3 3000 UNITS Tabs     30 tablet    Take 3,000 Int'l Units by mouth daily    Vitamin D deficiency       zonisamide 25 MG capsule    Zonegran    90 capsule    Take 1 tablet (25 mg) once daily for 1 week, then 2 tablets once daily for 1 week, then 3 tablets once daily for 1 week, then 4 tablets once daily for 1 week.    Headache disorder       * Notice:  This list has 4 medication(s) that are the same as other  medications prescribed for you. Read the directions carefully, and ask your doctor or other care provider to review them with you.

## 2017-10-10 NOTE — PROGRESS NOTES
Doretha Fernandez , a 42 year old year old female patient, I was asked to see by Dr. Naidu for lymph enlarged and positive bx for melanoma.  She recently had a bx on left upper lid. She denies any new or chaning lesions.  She notes headaches on left temporal side.  She notes she had a eye exam when she had basal cell carcinoma dx. She denies any other new or changing skin lesions.  She notes itching spot on left back.  Sheis schedule to see Med/Onc this week at [facility], but she is also scheduled to go to [place].  She has not made up her mind yet.  She is scheduled for PET/CT tomorrow here.  Patient states this has been present for months.  Location L axilla .  Patient reports the following symptoms:  enlarged .  Patient reports the following previous treatments npone.  Patient reports the following modifying factors none.  Associated symptoms: none.  Patient has no other skin complaints today.  Remainder of the HPI, Meds, PMH, Allergies, FH, and SH was reviewed in chart.      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      Anxiety      Depression      Lumbago     left lower back pain     Malignant melanoma (H)      Mild persistent asthma      Prothrombin deficiency (H)     takes 81mg asa daily     Stroke (cerebrum) (H)     During      TIA (transient ischemic attack)        Past Surgical History:   Procedure Laterality Date     GYN SURGERY           REPAIR MOHS Left 2017    Procedure: REPAIR MOHS;  Left Upper Lid Moh's Reconstruction;  Surgeon: Kisha Bosch MD;  Location:  OR        Family History   Problem Relation Age of Onset     CANCER Mother 45     lung     Neurologic Disorder Mother      Lipids Father      GASTROINTESTINAL DISEASE Father      Depression Father      CANCER Maternal Grandmother      Blood Disease Maternal Grandmother      Arthritis Maternal Grandmother      DIABETES Maternal Grandmother       Depression Maternal Grandmother      Macular Degeneration Maternal Grandmother      Glaucoma Maternal Grandmother      DIABETES Maternal Grandfather      CEREBROVASCULAR DISEASE Maternal Grandfather      Blood Disease Maternal Grandfather      HEART DISEASE Maternal Grandfather      Glaucoma Maternal Grandfather      CANCER Paternal Grandmother      Cancer - colorectal Paternal Grandmother      Respiratory Paternal Grandfather      Blood Disease Paternal Grandfather      HEART DISEASE Daughter      Asthma Daughter      Depression Sister        Social History     Social History     Marital status:      Spouse name: N/A     Number of children: N/A     Years of education: N/A     Occupational History     Not on file.     Social History Main Topics     Smoking status: Passive Smoke Exposure - Never Smoker     Last attempt to quit: 3/20/1998     Smokeless tobacco: Never Used     Alcohol use No      Comment: occ     Drug use: No     Sexual activity: Yes     Partners: Male     Birth control/ protection: Surgical     Other Topics Concern     Parent/Sibling W/ Cabg, Mi Or Angioplasty Before 65f 55m? No     Social History Narrative    19 y.o- patient's mother   of lung cancer. She had to take care of her younger sister.    2012- patient's  had a heart attack with stents placed, followed by cardiac rehabilitation    2000 TO 2012  was in [facility] for depression    2013 patient's  went through alcohol rehabilitation at [facility] inpatient            They have attended couple counseling a couple of times and patient went to the family program for chemical dependency.    Patient denies alcohol or drug use and herself            Has 2 children, girls ages 5 and 8    [place] [employer] and also works for the [employer]. 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               Outpatient Encounter Prescriptions as of 10/10/2017    Medication Sig Dispense Refill     Cholecalciferol (VITAMIN D3) 3000 UNITS TABS Take 3,000 Int'l Units by mouth daily 30 tablet 3     diazepam (VALIUM) 10 MG tablet Take 1 tablet (10 mg) by mouth every 6 hours as needed for anxiety or sleep Take 30-60 minutes before procedure.  Do not operate a vehicle after taking this medication. 1 tablet 0     zonisamide (ZONEGRAN) 25 MG capsule Take 1 tablet (25 mg) once daily for 1 week, then 2 tablets once daily for 1 week, then 3 tablets once daily for 1 week, then 4 tablets once daily for 1 week. 90 capsule 1     FLUoxetine (PROZAC) 20 MG capsule TAKE THREE CAPSULE BY MOUTH ONCE DAILY 270 capsule 1     topiramate (TOPAMAX) 50 MG tablet Take 1 tablet (50 mg) by mouth 3 times daily 270 tablet 3     gabapentin (NEURONTIN) 300 MG capsule Take 4 capsules (1,200 mg) by mouth At Bedtime 360 capsule 1     DiazePAM (VALIUM PO)        buPROPion (WELLBUTRIN XL) 150 MG 24 hr tablet Take 1 tablet (150 mg) by mouth every morning 30 tablet 1     topiramate (TOPAMAX) 25 MG tablet Take 100mg at bedtime for 2 weeks, then 125mg  At hs (Patient taking differently: 200 mg Take 100mg at bedtime for 2 weeks, then 125mg  At hs) 180 tablet 3     aspirin 81 MG EC tablet Take 81 mg by mouth daily        LORazepam (ATIVAN) 1 MG tablet Take 1 tablet (1 mg) by mouth every 8 hours as needed for anxiety 30 tablet 0     Acetaminophen (TYLENOL PO) Take 1,000 mg by mouth every 6 hours as needed for mild pain or fever       fluticasone (FLONASE) 50 MCG/ACT nasal spray Spray 2 sprays into both nostrils daily 1 g 3     Facility-Administered Encounter Medications as of 10/10/2017   Medication Dose Route Frequency Provider Last Rate Last Dose     lidocaine 1 % 9 mL  9 mL Intradermal Once Anna Naidu MD         sodium bicarbonate 8.4 % injection 1 mEq  1 mEq Intradermal Once Anna Naidu MD                 Review Of Systems  Skin: As above  Eyes: negative  Ears/Nose/Throat: negative  Respiratory: No  "shortness of breath, dyspnea on exertion, cough, or hemoptysis  Cardiovascular: negative  Gastrointestinal: negative  Genitourinary: negative  Musculoskeletal: negative  Neurologic: negative  Psychiatric: negative  Hematologic/Lymphatic/Immunologic: negative  Endocrine: negative      O:   NAD, WDWN, Alert & Oriented, Mood & Affect wnl, Vitals stable   Here today with friend    /85  Pulse 93  Ht 1.6 m (5' 3\")  LMP 09/07/2017   General appearance jihan    Vitals stable   Alert, oriented and in no acute distress      Following lymph nodes palpated: Occipital, Cervical, Supraclavicular . Popliteal inguinal, femoral no lad   L axilla with palpable nodule    L flank crusted pink bleeding 6mm papule   L mid back red brown crusted papule   R upper back 1cm pink papule with pinpoint black pigment network    Stuck on papules and brown macules on trunk and ext    Bosworth papules on trunk         The remainder of the full exam was unremarkable; the following areas were examined:  conjunctiva/lids, oral mucosa, neck, peripheral vascular system, abdomen, lymph nodes, digits/nails, eccrine and apocrine glands, scalp/hair, face, neck, chest, abdomen, buttocks, back, RUE, LUE, RLE, LLE       Eyes: Conjunctivae/lids:Normal     ENT: Lips, buccal mucosa, tongue: normal    MSK:Normal    Cardiovascular: peripheral edema none    Pulm: Breathing Normal    Lymph Nodes: No Head and Neck Lymphadenopathy     Neuro/Psych: Orientation:Normal; Mood/Affect:Normal      A/P:  1. Seborrheic keratosis, lentigo, dermal nevus,   2. R/o melanoma   TANGENTIAL BIOPSY SENT OUT:  After consent, anesthesia with LEC and prep, tangential excision performed and specimen sent out for permanent section histology.  No complications and routine wound care. Patient told to call our office in 1-2 weeks for result.      L mid back, L flank, R upper back   3. Metastatic melanoma  MELANOMA DISCUSSED WITH PATIENT:  I discussed the specifics of tumor, prognosis, " metachronous melanoma, self exam, and genetics with the patient. I explained the need for monthly skin exams including and taught the patient how to do this. Patient was asked about new or changing moles and a full skin exam was performed.     Surg onc and Med onc discussed with patient   Adjuvant therapy discussed with patient   Plane would be for pet/ct and removal of nodes for complete staging workup  Repeat eye exam discussed with patient   Q3 month skin checks    I spoke with med onc and she is going down today to discuss with them and she will then choose [facility] or [facility].    Once I hear back from her I will contatc Dr. Cool at [facility] if needed          BENIGN LESIONS DISCUSSED WITH PATIENT:  I discussed the specifics of tumor, prognosis, and genetics of benign lesions.  I explained that treatment of these lesions would be purely cosmetic and not medically neccessary.  I discussed with patient different removal options including excision, cautery and /or laser.      Nature and genetics of benign skin lesions dicussed with patient.  Signs and Symptoms of skin cancer discussed with patient.  ABCDEs of melanoma reviewed with patient.  Patient encouraged to perform monthly skin exams.  UV precautions reviewed with patient.  Skin care regimen reviewed with patient: Eliminate harsh soaps, i.e. Dial, zest, irsih spring; Mild soaps such as Cetaphil or Dove sensitive skin, avoid hot or cold showers, aggressive use of emollients including vanicream, cetaphil or cerave discussed with patient.    Risks of non-melanoma skin cancer discussed with patient   Return to clinic 3 months

## 2017-10-11 ENCOUNTER — HOSPITAL ENCOUNTER (OUTPATIENT)
Dept: PET IMAGING | Facility: CLINIC | Age: 42
Discharge: HOME | End: 2017-10-11
Attending: INTERNAL MEDICINE | Admitting: INTERNAL MEDICINE
Payer: COMMERCIAL

## 2017-10-11 DIAGNOSIS — C43.9 METASTATIC MALIGNANT MELANOMA (H): ICD-10-CM

## 2017-10-11 PROCEDURE — 78815 PET IMAGE W/CT SKULL-THIGH: CPT | Mod: PI

## 2017-10-11 PROCEDURE — A9552 F18 FDG: HCPCS | Performed by: INTERNAL MEDICINE

## 2017-10-11 PROCEDURE — 34300033 ZZH RX 343: Performed by: INTERNAL MEDICINE

## 2017-10-11 RX ADMIN — FLUDEOXYGLUCOSE F-18 10.6 MCI.: 500 INJECTION, SOLUTION INTRAVENOUS at 16:44

## 2017-10-11 NOTE — TELEPHONE ENCOUNTER
This has already been set up, thanks to Dr. Bullock for this.  Has appointment with both surgical oncology and medical oncology this week.    Anna Naidu M.D.

## 2017-10-12 ENCOUNTER — OFFICE VISIT (OUTPATIENT)
Dept: PSYCHIATRY | Facility: CLINIC | Age: 42
End: 2017-10-12
Attending: FAMILY MEDICINE
Payer: COMMERCIAL

## 2017-10-12 ENCOUNTER — OFFICE VISIT (OUTPATIENT)
Dept: OPHTHALMOLOGY | Facility: CLINIC | Age: 42
End: 2017-10-12
Attending: OPHTHALMOLOGY
Payer: COMMERCIAL

## 2017-10-12 ENCOUNTER — ONCOLOGY VISIT (OUTPATIENT)
Dept: ONCOLOGY | Facility: CLINIC | Age: 42
End: 2017-10-12
Attending: DERMATOLOGY
Payer: COMMERCIAL

## 2017-10-12 VITALS
HEART RATE: 82 BPM | DIASTOLIC BLOOD PRESSURE: 73 MMHG | HEIGHT: 63 IN | SYSTOLIC BLOOD PRESSURE: 115 MMHG | BODY MASS INDEX: 36.68 KG/M2 | WEIGHT: 207 LBS

## 2017-10-12 VITALS
DIASTOLIC BLOOD PRESSURE: 73 MMHG | TEMPERATURE: 97.8 F | OXYGEN SATURATION: 96 % | WEIGHT: 207.3 LBS | HEIGHT: 63 IN | HEART RATE: 82 BPM | SYSTOLIC BLOOD PRESSURE: 115 MMHG | RESPIRATION RATE: 16 BRPM | BODY MASS INDEX: 36.73 KG/M2

## 2017-10-12 DIAGNOSIS — R94.8 ABNORMAL POSITRON EMISSION TOMOGRAPHY (PET) SCAN: ICD-10-CM

## 2017-10-12 DIAGNOSIS — H53.10 SUBJECTIVE VISUAL DISTURBANCE: Primary | ICD-10-CM

## 2017-10-12 DIAGNOSIS — H53.10 SUBJECTIVE VISUAL DISTURBANCE: ICD-10-CM

## 2017-10-12 DIAGNOSIS — G47.33 OSA (OBSTRUCTIVE SLEEP APNEA): Primary | ICD-10-CM

## 2017-10-12 DIAGNOSIS — F33.1 MAJOR DEPRESSIVE DISORDER, RECURRENT EPISODE, MODERATE (H): ICD-10-CM

## 2017-10-12 DIAGNOSIS — F41.1 GAD (GENERALIZED ANXIETY DISORDER): ICD-10-CM

## 2017-10-12 DIAGNOSIS — C43.9 METASTATIC MALIGNANT MELANOMA (H): Primary | ICD-10-CM

## 2017-10-12 DIAGNOSIS — C43.62 MALIGNANT MELANOMA OF LEFT UPPER EXTREMITY INCLUDING SHOULDER (H): Primary | ICD-10-CM

## 2017-10-12 PROCEDURE — 99205 OFFICE O/P NEW HI 60 MIN: CPT | Performed by: INTERNAL MEDICINE

## 2017-10-12 PROCEDURE — 92250 FUNDUS PHOTOGRAPHY W/I&R: CPT | Mod: ZF | Performed by: OPHTHALMOLOGY

## 2017-10-12 PROCEDURE — 76513 OPH US DX ANT SGM US UNI/BI: CPT | Mod: ZF | Performed by: OPHTHALMOLOGY

## 2017-10-12 PROCEDURE — 99212 OFFICE O/P EST SF 10 MIN: CPT | Mod: ZF

## 2017-10-12 PROCEDURE — 90792 PSYCH DIAG EVAL W/MED SRVCS: CPT | Performed by: CLINICAL NURSE SPECIALIST

## 2017-10-12 RX ORDER — VENLAFAXINE HYDROCHLORIDE 37.5 MG/1
CAPSULE, EXTENDED RELEASE ORAL
Qty: 46 CAPSULE | Refills: 1 | Status: ON HOLD | OUTPATIENT
Start: 2017-10-12 | End: 2017-11-18

## 2017-10-12 ASSESSMENT — VISUAL ACUITY
METHOD: SNELLEN - LINEAR
OD_SC: 20/20
OS_PH_SC: 20/40
OS_SC: 20/50

## 2017-10-12 ASSESSMENT — CONF VISUAL FIELD
OD_NORMAL: 1
OS_NORMAL: 1

## 2017-10-12 ASSESSMENT — ANXIETY QUESTIONNAIRES
2. NOT BEING ABLE TO STOP OR CONTROL WORRYING: NEARLY EVERY DAY
6. BECOMING EASILY ANNOYED OR IRRITABLE: SEVERAL DAYS
1. FEELING NERVOUS, ANXIOUS, OR ON EDGE: NEARLY EVERY DAY
7. FEELING AFRAID AS IF SOMETHING AWFUL MIGHT HAPPEN: NEARLY EVERY DAY
GAD7 TOTAL SCORE: 19
3. WORRYING TOO MUCH ABOUT DIFFERENT THINGS: NEARLY EVERY DAY
4. TROUBLE RELAXING: NEARLY EVERY DAY
5. BEING SO RESTLESS THAT IT IS HARD TO SIT STILL: NEARLY EVERY DAY

## 2017-10-12 ASSESSMENT — SLIT LAMP EXAM - LIDS: COMMENTS: NORMAL

## 2017-10-12 ASSESSMENT — TONOMETRY
OD_IOP_MMHG: 13
OS_IOP_MMHG: 12
IOP_METHOD: TONOPEN

## 2017-10-12 ASSESSMENT — EXTERNAL EXAM - RIGHT EYE: OD_EXAM: NORMAL

## 2017-10-12 ASSESSMENT — CUP TO DISC RATIO
OS_RATIO: 0.2
OD_RATIO: 0.2

## 2017-10-12 ASSESSMENT — PAIN SCALES - GENERAL: PAINLEVEL: NO PAIN (0)

## 2017-10-12 ASSESSMENT — PATIENT HEALTH QUESTIONNAIRE - PHQ9: SUM OF ALL RESPONSES TO PHQ QUESTIONS 1-9: 17

## 2017-10-12 ASSESSMENT — EXTERNAL EXAM - LEFT EYE: OS_EXAM: NORMAL

## 2017-10-12 NOTE — NURSING NOTE
Chief Complaints and History of Present Illnesses   Patient presents with     Follow Up For     s/p Subjective visual disturbance      HPI    Affected eye(s):  Both   Symptoms:     Blurred vision   No decreased vision   No floaters   No flashes      Frequency:  Constant       Do you have eye pain now?:  Yes   Location:  OS   Pain Level:  Moderate Pain (5)   Pain Duration:  9 months   Pain Frequency:  Intermittent      Comments:  Pt. Stated headaches daily for the last 9 months. Vision stable OU.  Kirit Taylor  12:15 PM October 12, 2017

## 2017-10-12 NOTE — MR AVS SNAPSHOT
After Visit Summary   10/12/2017    Doretha Fernandez    MRN: [id number]           Patient Information     Date Of Birth          1975        Visit Information        Provider Department      10/12/2017 8:00 AM Kathy Richards MD [facility]        Today's Diagnoses     Metastatic malignant melanoma (H)    -  1    Abnormal positron emission tomography (PET) scan          Care Instructions    We are referring you to [facility] for 2nd opinion. You will need to have a colonoscopy.  Please keep your appt tomorrow with the surgeon. We would like to see you back in clinic with Dr. Richards after surgery. Copy of appointments, and after visit summary (AVS) given to patient.  If you have any questions during business hours (M-F 8 AM- 4PM), please call Jeanie Keenan RN, BSN, OCN Oncology Hematology /Breast Cancer Navigator at [facility] [phone].   For questions after business hours, or on holidays/weekends, please call our after hours Nurse Triage line [phone]. Thank you.            Follow-ups after your visit        Additional Services     GASTROENTEROLOGY ADULT REF PROCEDURE ONLY       Last Lab Result: Creatinine (mg/dL)       Date                     Value                 10/04/2017               0.92             ----------  Body mass index is 36.72 kg/(m^2).     Needed:  No  Language:  English    Patient will be contacted to schedule procedure.     Please be aware that coverage of these services is subject to the terms and limitations of your health insurance plan.  Call member services at your health plan with any benefit or coverage questions.  Any procedures must be performed at a [facility] OR coordinated by your clinic's referral office.    Please bring the following with you to your appointment:    (1) Any X-Rays, CTs or MRIs which have been performed.  Contact the facility where they were done to arrange for pick  up prior to your scheduled appointment.    (2) List of current medications   (3) This referral request   (4) Any documents/labs given to you for this referral            [facility] REFERRAL                 Your next 10 appointments already scheduled     Oct 12, 2017  9:45 AM CDT   New Visit with AMADA Carbajal CNS   [facility]    [address]  [facility] [address]   [phone]            Oct 12, 2017 12:30 PM CDT   NEW RETINA with Asia Aranda MD   Eye Clinic ([facility])    [address]  [address]  9[address] Clin 9a  [facility] [address] [phone]            Oct 13, 2017 10:00 AM CDT   (Arrive by 9:45 AM)   New Patient Visit with Laurent Cool MD   [facility] ([facility])    [address]  2nd Floor  [place] [address] [phone]            Oct 18, 2017   Procedure with Debbie Stephens MD   [facility] ([facility] [place])    [address]  5th Floor  [place] [address] [phone]           Located in the Clinics and Surgery Center at [address], [place] [address].   parking is very convenient and highly recommended.  is a $6 flat rate fee.  Both  and self parkers should enter the main arrival plaza from [address]; parking attendants will direct you based on your parking preference.            Oct 23, 2017   Procedure with Laurent Cool MD   [facility] [place], Same Day Surgery (--)    [address]  [place] [address]   [phone]            Nov 02, 2017 10:30 AM CDT   Return Visit with Kathy Richards MD   [facility] ([facility])    [facility]  [address]  [facility] [address]   [phone]            Nov 15, 2017  2:00 PM CST   (Arrive by 1:45 PM)   Return Visit with Bairon Farely  MD Ruth   [facility] Neurology ([facility])    [address]  3rd Floor  [facility] [place] [address] [phone]            Jan 09, 2018 10:00 AM CST   [facility] Lowell [facility]    [address]  [facility] [address]   [phone]              Future tests that were ordered for you today     Open Future Orders        Priority Expected Expires Ordered    Fundus Photos OU (both eyes) Routine  4/12/2019 10/12/2017    Fundus Autofluorescence Image (FAF) OU (both eyes) Routine  4/12/2019 10/12/2017            Who to contact     If you have questions or need follow up information about today's clinic visit or your schedule please contact [facility] directly at [phone].  Normal or non-critical lab and imaging results will be communicated to you by MyChart, letter or phone within 4 business days after the clinic has received the results. If you do not hear from us within 7 days, please contact the clinic through [employer] or phone. If you have a critical or abnormal lab result, we will notify you by phone as soon as possible.  Submit refill requests through [employer] or call your pharmacy and they will forward the refill request to us. Please allow 3 business days for your refill to be completed.          Additional Information About Your Visit        MyChart Information     [employer] gives you secure access to your electronic health record. If you see a primary care provider, you can also send messages to your care team and make appointments. If you have questions, please call your primary care clinic.  If you do not have a primary care provider, please call [phone] and they will assist you.        Care EveryWhere ID     This is your Care EveryWhere ID. This could be used by other organizations to access your [facility] medical records  [id number]        Your Vitals Were     Pulse  "Temperature Respirations Height Last Period Pulse Oximetry    82 97.8  F (36.6  C) (Tympanic) 16 1.6 m (5' 3\") 09/07/2017 96%    Breastfeeding? BMI (Body Mass Index)                No 36.72 kg/m2           Blood Pressure from Last 3 Encounters:   10/12/17 115/73   10/10/17 128/85   09/28/17 92/58    Weight from Last 3 Encounters:   10/12/17 94 kg (207 lb 4.8 oz)   09/28/17 90.7 kg (200 lb)   09/12/17 94.3 kg (208 lb)              We Performed the Following     GASTROENTEROLOGY ADULT REF PROCEDURE ONLY     [facility] REFERRAL          Today's Medication Changes          These changes are accurate as of: 10/12/17  9:32 AM.  If you have any questions, ask your nurse or doctor.               These medicines have changed or have updated prescriptions.        Dose/Directions    fluticasone 50 MCG/ACT spray   Commonly known as:  FLONASE   This may have changed:    - when to take this  - reasons to take this   Used for:  Seasonal allergic rhinitis        Dose:  2 spray   Spray 2 sprays into both nostrils daily   Quantity:  1 g   Refills:  3                Primary Care Provider Office Phone # Fax #    Anna Naidu [phone] [address] [facility] [address]        Equal Access to Services     BRIGETTE QUARLES AH: Nicol felicianoo Soalphonso, waaxda luqadaha, qaybta kaalmada adeegyada, pancho castillo. So [facility] [phone].    ATENCIÓN: Si habla español, tiene a mcdonnell disposición servicios gratuitos de asistencia lingüística. Llame al [phone].    We comply with applicable federal civil rights laws and [place] laws. We do not discriminate on the basis of race, color, national origin, age, disability, sex, sexual orientation, or gender identity.            Thank you!     Thank you for choosing [facility]  for your care. Our goal is always to provide you with excellent care. Hearing back from our patients is one way we can continue to improve our services. " Please take a few minutes to complete the written survey that you may receive in the mail after your visit with us. Thank you!             Your Updated Medication List - Protect others around you: Learn how to safely use, store and throw away your medicines at www.disposemymeds.org.          This list is accurate as of: 10/12/17  9:32 AM.  Always use your most recent med list.                   Brand Name Dispense Instructions for use Diagnosis    aspirin 81 MG EC tablet      Take 81 mg by mouth daily        buPROPion 150 MG 24 hr tablet    WELLBUTRIN XL    30 tablet    Take 1 tablet (150 mg) by mouth every morning    Mild major depression (H)       FLUoxetine 20 MG capsule    PROzac    270 capsule    TAKE THREE CAPSULE BY MOUTH ONCE DAILY    Major depressive disorder, single episode, mild (H)       fluticasone 50 MCG/ACT spray    FLONASE    1 g    Spray 2 sprays into both nostrils daily    Seasonal allergic rhinitis       LORazepam 1 MG tablet    ATIVAN    30 tablet    Take 1 tablet (1 mg) by mouth every 8 hours as needed for anxiety    Anxiety, Acute intractable tension-type headache       TYLENOL PO      Take 1,000 mg by mouth every 6 hours as needed for mild pain or fever        * VALIUM PO      Take by mouth as needed for anxiety        * diazepam 10 MG tablet    VALIUM    1 tablet    Take 1 tablet (10 mg) by mouth every 6 hours as needed for anxiety or sleep Take 30-60 minutes before procedure.  Do not operate a vehicle after taking this medication.    Headache disorder       Vitamin D3 3000 UNITS Tabs     30 tablet    Take 3,000 Int'l Units by mouth daily    Vitamin D deficiency       zonisamide 25 MG capsule    Zonegran    90 capsule    Take 1 tablet (25 mg) once daily for 1 week, then 2 tablets once daily for 1 week, then 3 tablets once daily for 1 week, then 4 tablets once daily for 1 week.    Headache disorder       * Notice:  This list has 2 medication(s) that are the same as other medications prescribed  for you. Read the directions carefully, and ask your doctor or other care provider to review them with you.

## 2017-10-12 NOTE — MR AVS SNAPSHOT
After Visit Summary   10/12/2017    Doretha Fernandez    MRN: [id number]           Patient Information     Date Of Birth          1975        Visit Information        Provider Department      10/12/2017 9:45 AM Erendira Rene APRN [facility]        Today's Diagnoses     STORMY (obstructive sleep apnea)    -  1    Major depressive disorder, recurrent episode, moderate (H)        SANDRA (generalized anxiety disorder)          Care Instructions    Treatment Plan:  Continue lorazepam (Ativan) 1 mg every 8 hours daily as needed.     Taper to discontinue fluoxetine (Prozac) by reducing to 40 mg daily for 4 days, then 20 mg for 4 days, then discontinue.     When taking fluoxetine (Prozac) 20 mg daily, begin venlafaxine (Effexor) XR 37.5 mg for 14 days, then take 2 caps (75 mg) daily.     Continue individual therapy.     Follow up in one month.     - Recommend patient discuss medications with their pharmacist. Risks and benefits of medications discussed, including side effect profile.   - Safety plan was reviewed; to the ER as needed or call after hours crisis line; [phone]  - Education and counseling was done regarding use of medications, psychotherapy options  - Call [phone] for appointment or to speak to a nurse.   -Office hours: Monday through Thursday 8:00 am to 4:30 pm; Friday 8:00 am to Noon.   - Patient was given a copy of this Treatment Plan today.           Follow-ups after your visit        Additional Services     SLEEP EVALUATION & MANAGEMENT REFERRAL - ADULT       Please be aware that coverage of these services is subject to the terms and limitations of your health insurance plan.  Call member services at your health plan with any benefit or coverage questions.      Please bring the following to your appointment:    >>   List of current medications   >>   This referral request   >>   Any documents/labs given to you for this referral    [facility]  Clinic / Lab  Ph [phone] (Age 2 and up)                  Your next 10 appointments already scheduled     Oct 12, 2017 12:30 PM CDT   NEW RETINA with Asia Aranda MD   Eye Clinic ([facility])    [address]  [address]  9th Fl Clin 9a  [facility] [address]   [phone]            Oct 13, 2017 10:00 AM CDT   (Arrive by 9:45 AM)   New Patient Visit with Laurent Cool MD   [facility] ([facility] [place])    [address]  2nd Floor  [facility] [address] [phone]            Oct 18, 2017   Procedure with Debbie Stephens MD   [facility] ([facility] [place])    [address]  5th Floor  [facility] [address] [phone]           Located in the Clinics and Surgery Center at [address] [place] [id number].   parking is very convenient and highly recommended.  is a $6 flat rate fee.  Both  and self parkers should enter the main arrival plaza from [facility]; parking attendants will direct you based on your parking preference.            Oct 23, 2017   Procedure with Laurent Cool MD   [facility] [place], Same Day Surgery (--)    [address] [address]   [phone]            Nov 02, 2017 10:30 AM CDT   Return Visit with Kathy Richards MD   [facility] ([facility])    [facility]  [address]  [facility] [address]   [phone]            Nov 15, 2017  2:00 PM CST   (Arrive by 1:45 PM)   Return Visit with Bairon Miranda MD   [facility] Neurology ([facility] [place])    [address]  3rd Floor  [facility] [address]   [phone]            Jan 09, 2018 10:00 AM CST   Return Visit with Lowell Bullock MD   [facility]    [address]  [facility] [address]   [phone]          "     Future tests that were ordered for you today     Open Future Orders        Priority Expected Expires Ordered    SLEEP EVALUATION & MANAGEMENT REFERRAL - ADULT Routine  10/12/2018 10/12/2017    Fundus Photos OU (both eyes) Routine  4/12/2019 10/12/2017    Fundus Autofluorescence Image (FAF) OU (both eyes) Routine  4/12/2019 10/12/2017            Who to contact     If you have questions or need follow up information about today's clinic visit or your schedule please contact [facility] directly at [phone].  Normal or non-critical lab and imaging results will be communicated to you by [employer], letter or phone within 4 business days after the clinic has received the results. If you do not hear from us within 7 days, please contact the clinic through [employer] or phone. If you have a critical or abnormal lab result, we will notify you by phone as soon as possible.  Submit refill requests through [employer] or call your pharmacy and they will forward the refill request to us. Please allow 3 business days for your refill to be completed.          Additional Information About Your Visit        [employer] Information     [employer] gives you secure access to your electronic health record. If you see a primary care provider, you can also send messages to your care team and make appointments. If you have questions, please call your primary care clinic.  If you do not have a primary care provider, please call [phone] and they will assist you.        Care EveryWhere ID     This is your Care EveryWhere ID. This could be used by other organizations to access your [facility] medical records  [id number]        Your Vitals Were     Pulse Height Last Period BMI (Body Mass Index)          82 5' 3\" (1.6 m) 09/07/2017 36.67 kg/m2         Blood Pressure from Last 3 Encounters:   10/12/17 115/73   10/12/17 115/73   10/10/17 128/85    Weight from Last 3 Encounters:   10/12/17 207 lb (93.9 kg)   10/12/17 207 lb 4.8 oz (94 " kg)   09/28/17 200 lb (90.7 kg)                 Today's Medication Changes          These changes are accurate as of: 10/12/17 10:52 AM.  If you have any questions, ask your nurse or doctor.               Start taking these medicines.        Dose/Directions    venlafaxine 37.5 MG 24 hr capsule   Commonly known as:  EFFEXOR-XR   Used for:  Major depressive disorder, recurrent episode, moderate (H), SANDRA (generalized anxiety disorder)   Started by:  Erendira Rene APRN CNS        Take 1 capsule daily for 14 days, then take 2 capsules daily.   Quantity:  46 capsule   Refills:  1         These medicines have changed or have updated prescriptions.        Dose/Directions    fluticasone 50 MCG/ACT spray   Commonly known as:  FLONASE   This may have changed:    - when to take this  - reasons to take this   Used for:  Seasonal allergic rhinitis        Dose:  2 spray   Spray 2 sprays into both nostrils daily   Quantity:  1 g   Refills:  3         Stop taking these medicines if you haven't already. Please contact your care team if you have questions.     FLUoxetine 20 MG capsule   Commonly known as:  PROzac   Stopped by:  Erendira Rene APRN CNS                Where to get your medicines      These medications were sent to [facility] [address]  [address] [facility] [address]     Phone:  [phone]     venlafaxine 37.5 MG 24 hr capsule                Primary Care Provider Office Phone # Fax #    Anna Naidu [phone] [address] [facility] [address]        Equal Access to Services     [facility]: Hadii aad ku hadasho Soomaali, waaxda luqadaha, qaybta kaalmada adeegyabessy, pancho moore . So [facility] [phone].    ATENCIÓN: Si habla español, tiene a mcdonnell disposición servicios gratuitos de asistencia lingüística. Llame al [phone].    We comply with applicable federal  civil rights laws and [place] laws. We do not discriminate on the basis of race, color, national origin, age, disability, sex, sexual orientation, or gender identity.            Thank you!     Thank you for choosing [facility]  for your care. Our goal is always to provide you with excellent care. Hearing back from our patients is one way we can continue to improve our services. Please take a few minutes to complete the written survey that you may receive in the mail after your visit with us. Thank you!             Your Updated Medication List - Protect others around you: Learn how to safely use, store and throw away your medicines at www.disposemymeds.org.          This list is accurate as of: 10/12/17 10:52 AM.  Always use your most recent med list.                   Brand Name Dispense Instructions for use Diagnosis    aspirin 81 MG EC tablet      Take 81 mg by mouth daily        fluticasone 50 MCG/ACT spray    FLONASE    1 g    Spray 2 sprays into both nostrils daily    Seasonal allergic rhinitis       LORazepam 1 MG tablet    ATIVAN    30 tablet    Take 1 tablet (1 mg) by mouth every 8 hours as needed for anxiety    Anxiety, Acute intractable tension-type headache       TYLENOL PO      Take 1,000 mg by mouth every 6 hours as needed for mild pain or fever        venlafaxine 37.5 MG 24 hr capsule    EFFEXOR-XR    46 capsule    Take 1 capsule daily for 14 days, then take 2 capsules daily.    Major depressive disorder, recurrent episode, moderate (H), SANDRA (generalized anxiety disorder)       Vitamin D3 3000 UNITS Tabs     30 tablet    Take 3,000 Int'l Units by mouth daily    Vitamin D deficiency       zonisamide 25 MG capsule    Zonegran    90 capsule    Take 1 tablet (25 mg) once daily for 1 week, then 2 tablets once daily for 1 week, then 3 tablets once daily for 1 week, then 4 tablets once daily for 1 week.    Headache disorder

## 2017-10-12 NOTE — PROGRESS NOTES
I have confirmed the patient's and reviewed Past Medical History, Past Surgical History, Social History, Family History, Problem List, Medication List and agree with Tech note.    CC: melanoma mets    HPI: Doretha Fernandez is a 42 year old female who was recently diagnosed with malignant melanoma from left axilla. Reports she has been having left side headaches, blurry vision for the last 9 months. 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     Assessment/plan:  1.  Metastatic malignant melanoma, left axilla   - Here to rule out ocular primary   - Optomap and dilated exam normal.   - Anterior segment B scan to rule out ciliary body tumor is normal   - Electroretinogram (ERG) for MAR if any unexplained a deterioration        RTC 2 months for repeat dilated fundus exam       Myles Mireles MD  PGY3     ATTESTATION:  I have seen and examined the patient with Dr. Mireles and agree with the findings in this note, as well as the interpretations of the diagnostic tests.    Asia Camara MD PhD.  Professor & Chair

## 2017-10-12 NOTE — MR AVS SNAPSHOT
After Visit Summary   10/12/2017    Doretha Fernandez    MRN: [id number]           Patient Information     Date Of Birth          1975        Visit Information        Provider Department      10/12/2017 12:30 PM [facility]        Today's Diagnoses     Malignant melanoma of left upper extremity including shoulder (H)    -  1    Subjective visual disturbance           Follow-ups after your visit        Follow-up notes from your care team     Return in about 6 months (around 4/12/2018) for Follow Up.      Your next 10 appointments already scheduled     Oct 13, 2017 10:00 AM CDT   (Arrive by 9:45 AM)   New Patient Visit with Laurent Cool MD   [facility] ([facility])    [address] [place] [address]  2nd Floor  [facility] [id number] [phone]            Oct 18, 2017   Procedure with Debbie Stephens MD   [facility] ([facility])    [address] [place] [address]  5th Floor  [facility] [id number] [phone]           Located in the Clinics and Surgery Center at [address] [place] [address].   parking is very convenient and highly recommended.  is a $6 flat rate fee.  Both  and self parkers should enter the main arrival plaza from [facility]; parking attendants will direct you based on your parking preference.            Oct 23, 2017   Procedure with Laurent Cool MD   [facility] [place] Same Day [facility] (--)    [address] [facility] [address]   [phone]            Nov 02, 2017 10:30 AM CDT   Return Visit with Kathy Richards MD   [facility] ([facility])    [facility]  [address]  [facility] [address]   [phone]            Nov 15, 2017  2:00 PM CST   (Arrive by 1:45 PM)   Return Visit with Bairon Miranda MD   [facility] ([facility])    [address] [place] [address]  Street Se  [address] [facility] [address] [phone]            Jan 09, 2018 10:00 AM CST   Return Visit with Lowell Bullock MD   [facility] ([facility]    [address]  [facility] [address] [phone]              Future tests that were ordered for you today     Open Future Orders        Priority Expected Expires Ordered    SLEEP EVALUATION & MANAGEMENT REFERRAL - ADULT Routine  10/12/2018 10/12/2017            Who to contact     Please call your clinic at [phone] to:    Ask questions about your health    Make or cancel appointments    Discuss your medicines    Learn about your test results    Speak to your doctor   If you have compliments or concerns about an experience at your clinic, or if you wish to file a complaint, please contact [facility] Patient Relations at [phone] or email us at [email or web address]         Additional Information About Your Visit        [employer] Information     [employer] gives you secure access to your electronic health record. If you see a primary care provider, you can also send messages to your care team and make appointments. If you have questions, please call your primary care clinic.  If you do not have a primary care provider, please call [phone] and they will assist you.      [employer] is an electronic gateway that provides easy, online access to your medical records. With [employer], you can request a clinic appointment, read your test results, renew a prescription or communicate with your care team.     To access your existing account, please contact your [facility] or call [phone] for assistance.        Care EveryWhere ID     This is your Care EveryWhere ID. This could be used by other organizations to access your [facility] medical records  [id number]        Your Vitals Were     Last Period                   09/07/2017             Blood Pressure from Last 3 Encounters:   10/12/17 115/73   10/12/17 115/73   10/10/17 128/85    Weight from Last 3 Encounters:   10/12/17 93.9 kg (207 lb)   10/12/17 94 kg (207 lb 4.8 oz)   09/28/17 90.7 kg (200 lb)              We Performed the Following     Fundus Autofluorescence Image (FAF) OU (both eyes)     Fundus Photos OU (both eyes)     UBM-Anterior Segment US OU (both eyes)          Today's Medication Changes          These changes are accurate as of: 10/12/17  2:06 PM.  If you have any questions, ask your nurse or doctor.               Start taking these medicines.        Dose/Directions    venlafaxine 37.5 MG 24 hr capsule   Commonly known as:  EFFEXOR-XR   Used for:  Major depressive disorder, recurrent episode, moderate (H), SANDRA (generalized anxiety disorder)   Started by:  Erendira Rene APRN CNS        Take 1 capsule daily for 14 days, then take 2 capsules daily.   Quantity:  46 capsule   Refills:  1         These medicines have changed or have updated prescriptions.        Dose/Directions    fluticasone 50 MCG/ACT spray   Commonly known as:  FLONASE   This may have changed:    - when to take this  - reasons to take this   Used for:  Seasonal allergic rhinitis        Dose:  2 spray   Spray 2 sprays into both nostrils daily   Quantity:  1 g   Refills:  3         Stop taking these medicines if you haven't already. Please contact your care team if you have questions.     FLUoxetine 20 MG capsule   Commonly known as:  PROzac   Stopped by:  Erendira Rene APRN CNS                Where to get your medicines      These medications were sent to [facility] [place], [place] - [address]  [address] [facility] [address]     Phone:  [phone]     venlafaxine 37.5 MG 24 hr capsule                Primary Care Provider Office Phone # Fax #    Anna Naidu [phone] [address] [facility] [address]         Equal Access to Services     [facility] : Hadii aad ku hadatifshayna Rubenali, warosada luqadaha, qaybta kamoshepancho murray. So [facility] [phone].    ATENCIÓN: Si jessicala heidi, tiene a mcdonnell disposición servicios gratuitos de asistencia lingüística. Liame al [phone].    We comply with applicable federal civil rights laws and [place] laws. We do not discriminate on the basis of race, color, national origin, age, disability, sex, sexual orientation, or gender identity.            Thank you!     Thank you for choosing EYE CLINIC  for your care. Our goal is always to provide you with excellent care. Hearing back from our patients is one way we can continue to improve our services. Please take a few minutes to complete the written survey that you may receive in the mail after your visit with us. Thank you!             Your Updated Medication List - Protect others around you: Learn how to safely use, store and throw away your medicines at www.disposemymeds.org.          This list is accurate as of: 10/12/17  2:06 PM.  Always use your most recent med list.                   Brand Name Dispense Instructions for use Diagnosis    aspirin 81 MG EC tablet      Take 81 mg by mouth daily        fluticasone 50 MCG/ACT spray    FLONASE    1 g    Spray 2 sprays into both nostrils daily    Seasonal allergic rhinitis       LORazepam 1 MG tablet    ATIVAN    30 tablet    Take 1 tablet (1 mg) by mouth every 8 hours as needed for anxiety    Anxiety, Acute intractable tension-type headache       TYLENOL PO      Take 1,000 mg by mouth every 6 hours as needed for mild pain or fever        venlafaxine 37.5 MG 24 hr capsule    EFFEXOR-XR    46 capsule    Take 1 capsule daily for 14 days, then take 2 capsules daily.    Major depressive disorder, recurrent episode, moderate (H), SANDRA (generalized anxiety disorder)       Vitamin D3 3000 UNITS Tabs     30 tablet    Take 3,000 Int'l Units by mouth daily     Vitamin D deficiency       zonisamide 25 MG capsule    Zonegran    90 capsule    Take 1 tablet (25 mg) once daily for 1 week, then 2 tablets once daily for 1 week, then 3 tablets once daily for 1 week, then 4 tablets once daily for 1 week.    Headache disorder

## 2017-10-12 NOTE — PATIENT INSTRUCTIONS
Treatment Plan:  Continue lorazepam (Ativan) 1 mg every 8 hours daily as needed.     Taper to discontinue fluoxetine (Prozac) by reducing to 40 mg daily for 4 days, then 20 mg for 4 days, then discontinue.     When taking fluoxetine (Prozac) 20 mg daily, begin venlafaxine (Effexor) XR 37.5 mg for 14 days, then take 2 caps (75 mg) daily.     Continue individual therapy.     Follow up in one month.     - Recommend patient discuss medications with their pharmacist. Risks and benefits of medications discussed, including side effect profile.   - Safety plan was reviewed; to the ER as needed or call after hours crisis line; [phone]  - Education and counseling was done regarding use of medications, psychotherapy options  - Call [phone] for appointment or to speak to a nurse.   -Office hours: Monday through Thursday 8:00 am to 4:30 pm; Friday 8:00 am to Noon.   - Patient was given a copy of this Treatment Plan today.

## 2017-10-12 NOTE — NURSING NOTE
"Chief Complaint   Patient presents with     Consult       Initial /73  Pulse 82  Ht 5' 3\" (1.6 m)  Wt 207 lb (93.9 kg)  LMP 09/07/2017  BMI 36.67 kg/m2 Estimated body mass index is 36.67 kg/(m^2) as calculated from the following:    Height as of this encounter: 5' 3\" (1.6 m).    Weight as of this encounter: 207 lb (93.9 kg).  Medication Reconciliation: complete  Ailyn Montenegro CMA    "

## 2017-10-12 NOTE — NURSING NOTE
"Oncology Rooming Note    October 12, 2017 8:19 AM   Doretha Fernandez is a 42 year old female who presents for:    Chief Complaint   Patient presents with     Oncology Clinic Visit     New Diagnosis Metastatic malignant melanoma     Initial Vitals: /73 (BP Location: Right arm, Patient Position: Sitting, Cuff Size: Adult Large)  Pulse 82  Temp 97.8  F (36.6  C) (Tympanic)  Resp 16  Ht 1.6 m (5' 3\")  Wt 94 kg (207 lb 4.8 oz)  LMP 09/07/2017  SpO2 96%  Breastfeeding? No  BMI 36.72 kg/m2 Estimated body mass index is 36.72 kg/(m^2) as calculated from the following:    Height as of this encounter: 1.6 m (5' 3\").    Weight as of this encounter: 94 kg (207 lb 4.8 oz). Body surface area is 2.04 meters squared.  No Pain (0) Comment: Data Unavailable   Patient's last menstrual period was 09/07/2017.  Allergies reviewed: Yes  Medications reviewed: Yes    Medications: Medication refills not needed today.  Pharmacy name entered into [facility]: [facility] [place], [place] - [address]    Clinical concerns: New Diagnosis Metastatic malignant melanoma.     7 minutes for nursing intake (face to face time)     Lesa Patel CMA              "
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
## 2017-10-12 NOTE — LETTER
10/12/2017       RE: Doretha Fernandez  [address]  [place] [address]     Dear Colleague,    Thank you for referring your patient, Doretha Fernandez, to the EYE CLINIC at [facility]. Please see a copy of my visit note below.    I have confirmed the patient's and reviewed Past Medical History, Past Surgical History, Social History, Family History, Problem List, Medication List and agree with Tech note.    CC: melanoma mets    HPI: Doretha Fernandez is a 42 year old female who was recently diagnosed with malignant melanoma from left axilla. Reports she has been having left side headaches, blurry vision for the last 9 months. 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     Assessment/plan:  1.  Metastatic malignant melanoma, left axilla   - Here to rule out ocular primary   - Optomap and dilated exam normal.   - Anterior segment B scan to rule out ciliary body tumor is normal   - Electroretinogram (ERG) for MAR if any unexplained a deterioration        RTC 2 months for repeat dilated fundus exam       Myles Mireles MD  PGY3     ATTESTATION:  I have seen and examined the patient with Dr. Mireles and agree with the findings in this note, as well as the interpretations of the diagnostic tests.    Asia Aranda MD PhD.  Professor & Chair        Again, thank you for allowing me to participate in the care of your patient.      Sincerely,    Asia Aranda MD

## 2017-10-12 NOTE — PATIENT INSTRUCTIONS
We are referring you to [facility] for 2nd opinion. You will need to have a colonoscopy.  Please keep your appt tomorrow with the surgeon. We would like to see you back in clinic with Dr. Richards after surgery. Copy of appointments, and after visit summary (AVS) given to patient.  If you have any questions during business hours (M-F 8 AM- 4PM), please call Jeanie Keenan RN, BSN, OCN Oncology Hematology /Breast Cancer Navigator at [facility] [phone].   For questions after business hours, or on holidays/weekends, please call our after hours Nurse Triage line [phone]. Thank you.

## 2017-10-13 ENCOUNTER — ONCOLOGY VISIT (OUTPATIENT)
Dept: ONCOLOGY | Facility: CLINIC | Age: 42
End: 2017-10-13
Attending: SURGERY
Payer: COMMERCIAL

## 2017-10-13 ENCOUNTER — TELEPHONE (OUTPATIENT)
Dept: GASTROENTEROLOGY | Facility: CLINIC | Age: 42
End: 2017-10-13

## 2017-10-13 VITALS
RESPIRATION RATE: 16 BRPM | DIASTOLIC BLOOD PRESSURE: 75 MMHG | TEMPERATURE: 98.6 F | HEART RATE: 82 BPM | BODY MASS INDEX: 36.94 KG/M2 | SYSTOLIC BLOOD PRESSURE: 113 MMHG | WEIGHT: 208.5 LBS | HEIGHT: 63 IN | OXYGEN SATURATION: 98 %

## 2017-10-13 DIAGNOSIS — G44.201 ACUTE INTRACTABLE TENSION-TYPE HEADACHE: ICD-10-CM

## 2017-10-13 DIAGNOSIS — C43.62 MALIGNANT MELANOMA OF LEFT UPPER EXTREMITY INCLUDING SHOULDER (H): Primary | ICD-10-CM

## 2017-10-13 DIAGNOSIS — F41.9 ANXIETY: ICD-10-CM

## 2017-10-13 DIAGNOSIS — Z12.11 ENCOUNTER FOR SCREENING COLONOSCOPY: Primary | ICD-10-CM

## 2017-10-13 PROCEDURE — 99213 OFFICE O/P EST LOW 20 MIN: CPT | Mod: ZF

## 2017-10-13 RX ORDER — LORAZEPAM 1 MG/1
1 TABLET ORAL EVERY 8 HOURS PRN
Qty: 30 TABLET | Refills: 0 | Status: SHIPPED | OUTPATIENT
Start: 2017-10-13 | End: 2018-03-03

## 2017-10-13 ASSESSMENT — ENCOUNTER SYMPTOMS
SEIZURES: 0
POOR WOUND HEALING: 0
SMELL DISTURBANCE: 0
EYE REDNESS: 0
CHILLS: 1
SORE THROAT: 0
CONSTIPATION: 1
HALLUCINATIONS: 0
SKIN CHANGES: 0
DISTURBANCES IN COORDINATION: 0
INSOMNIA: 1
POLYDIPSIA: 1
EYE PAIN: 1
SINUS CONGESTION: 0
NERVOUS/ANXIOUS: 1
TREMORS: 0
TASTE DISTURBANCE: 1
NIGHT SWEATS: 1
POLYPHAGIA: 0
VOMITING: 1
HEARTBURN: 1
ABDOMINAL PAIN: 1
DIARRHEA: 1
DOUBLE VISION: 0
BRUISES/BLEEDS EASILY: 0
BREAST MASS: 1
NUMBNESS: 1
PANIC: 1
TROUBLE SWALLOWING: 0
FATIGUE: 1
SWOLLEN GLANDS: 1
TINGLING: 1
BOWEL INCONTINENCE: 0
NAUSEA: 1
ALTERED TEMPERATURE REGULATION: 1
MEMORY LOSS: 0
EYE IRRITATION: 1
BLOOD IN STOOL: 0
INCREASED ENERGY: 1
WEIGHT GAIN: 1
RECTAL PAIN: 0
DEPRESSION: 1
HEADACHES: 1
BLOATING: 1
WEIGHT LOSS: 0
BREAST PAIN: 0
DECREASED CONCENTRATION: 1
SINUS PAIN: 1
SPEECH CHANGE: 0
HOARSE VOICE: 0
WEAKNESS: 0
PARALYSIS: 0
LOSS OF CONSCIOUSNESS: 0
EYE WATERING: 0
JAUNDICE: 0
FEVER: 0
DECREASED APPETITE: 1
RECTAL BLEEDING: 0
NAIL CHANGES: 0

## 2017-10-13 ASSESSMENT — ANXIETY QUESTIONNAIRES: GAD7 TOTAL SCORE: 19

## 2017-10-13 ASSESSMENT — PAIN SCALES - GENERAL: PAINLEVEL: MILD PAIN (3)

## 2017-10-13 NOTE — PROGRESS NOTES
Doretha Fernandez is a 42-year-old woman I was asked to see at the request of Dr. Lowell Bullock for evaluation of metastatic melanoma.  The patient noticed a lump in her left axilla recently.  She underwent an ultrasound and biopsy which demonstrated metastatic melanoma in a lymph node.  Her molecular studies are still pending.  She saw Dr. Bullock who did not identify an obvious primary, but he did biopsy a couple of other lesions which are still pending.  She also had an eye examination and there was no evidence of ocular melanoma.  She had a PET CT scan yesterday which I have reviewed, which demonstrates a left axillary lymph node metastasis.  There are no other obvious sites of metastatic disease.  She is now here to talk about treatment options.  She has had a basal cell carcinoma of her left eyelid, but she has had no other moles removed and no history of melanoma.      PAST MEDICAL HISTORY:  Migraines and a recent mono diagnosis.  She has had multiple brain MRIs and her last brain CT scan was in August, which was normal.      PHYSICAL EXAMINATION:  She has a palpable mass measuring about 2.5 cm in her left axilla.  It is mobile.      IMPRESSION:  Stage III melanoma.      PLAN:  I have told her that I would recommend a left axillary lymph node dissection, immune therapy and radiation therapy.  We do not know the preferred sequence of surgery versus immune therapy for someone with an isolated palpable lymph node metastasis.  She does wish to pursue surgery first.  I did talk to her about the risks and complications of this including nerve injuries and lymphedema.  She is scheduled for her surgery in 9 days.  She will follow up with Dr. Richards after surgery as well to talk about immune therapy and radiation therapy.      TT:  45 minutes.  CT:  35 minutes.      cc:   Lowell Bullock MD   [facility]   [address]  [address]      Kathy Richards MD   [facility]    [address]  [address]

## 2017-10-13 NOTE — MR AVS SNAPSHOT
After Visit Summary   10/13/2017    Doretha Fernandez    MRN: [id number]           Patient Information     Date Of Birth          1975        Visit Information        Provider Department      10/13/2017 10:00 AM Laurent Cool MD [facility]        Today's Diagnoses     Malignant melanoma of left upper extremity including shoulder (H)    -  1       Follow-ups after your visit        Your next 10 appointments already scheduled     Nov 02, 2017 10:30 AM CDT   Return Visit with Kathy Richards MD   [facility] ([facility])    [facility]  [address]  [facility] [place] [address]   [phone]            Nov 15, 2017  2:00 PM CST   (Arrive by 1:45 PM)   Return Visit with Bairon Miranda MD   [facility] ([facility])    [address] [place] [id number] [phone]            Jan 09, 2018 10:00 AM CST   Return Visit with Lowell Bullock MD   [facility] ([facility]    [address] [address]   [phone]              Who to contact     If you have questions or need follow up information about today's clinic visit or your schedule please contact [facility] directly at [phone].  Normal or non-critical lab and imaging results will be communicated to you by MyChart, letter or phone within 4 business days after the clinic has received the results. If you do not hear from us within 7 days, please contact the clinic through MyChart or phone. If you have a critical or abnormal lab result, we will notify you by phone as soon as possible.  Submit refill requests through [employer] or call your pharmacy and they will forward the refill request to us. Please allow 3 business days for your refill to be completed.          Additional Information About Your Visit        MyChart Information     [employer] [facility] gives you  "secure access to your electronic health record. If you see a primary care provider, you can also send messages to your care team and make appointments. If you have questions, please call your primary care clinic.  If you do not have a primary care provider, please call [phone] and they will assist you.        Care EveryWhere ID     This is your Care EveryWhere ID. This could be used by other organizations to access your [place] medical records  [id number]        Your Vitals Were     Pulse Temperature Respirations Height Last Period Pulse Oximetry    82 98.6  F (37  C) (Oral) 16 1.6 m (5' 2.99\") 10/07/2017 98%    BMI (Body Mass Index)                   36.94 kg/m2            Blood Pressure from Last 3 Encounters:   10/23/17 115/66   10/20/17 (!) 134/100   10/18/17 97/55    Weight from Last 3 Encounters:   10/23/17 94.5 kg (208 lb 5.4 oz)   10/18/17 94.7 kg (208 lb 12.8 oz)   10/13/17 94.6 kg (208 lb 8 oz)              Today, you had the following     No orders found for display         Today's Medication Changes          These changes are accurate as of: 10/13/17 11:59 PM.  If you have any questions, ask your nurse or doctor.               Start taking these medicines.        Dose/Directions    polyethylene glycol 236 G suspension   Commonly known as:  GoLYTELY/NuLYTELY   Used for:  Encounter for screening colonoscopy   Started by:  Emilia Tran, RN        Dose:  4 L   Take 4,000 mLs (4 L) by mouth once for 1 dose Refer to \"Getting Ready for a Colonoscopy\" instruction handout   Quantity:  4000 mL   Refills:  0            Where to get your medicines      These medications were sent to [facility] [place], [place] - [address]  [address] [facility] [address]     Phone:  [phone]     polyethylene glycol 236 G suspension         Some of these will need a paper prescription and others can be bought over the counter.  Ask your nurse if you have questions.  "    Bring a paper prescription for each of these medications     LORazepam 1 MG tablet                Primary Care Provider Office Phone # Fax #    Anna Naidu [phone] [address] [address]        Equal Access to Services     BRIGETTE QUARLES : Hadalysia josy garnett jodieo Soomaali, waaxda luqadaha, qaybta kaalmada adeegyada, pancho buenon liliam herrera laJorgecolin castillo. So [facility] [phone].    ATENCIÓN: Si habla español, tiene a mcdonnell disposición servicios gratuitos de asistencia lingüística. Llame al [phone].    We comply with applicable federal civil rights laws and [place] laws. We do not discriminate on the basis of race, color, national origin, age, disability, sex, sexual orientation, or gender identity.            Thank you!     Thank you for choosing [facility]  for your care. Our goal is always to provide you with excellent care. Hearing back from our patients is one way we can continue to improve our services. Please take a few minutes to complete the written survey that you may receive in the mail after your visit with us. Thank you!             Your Updated Medication List - Protect others around you: Learn how to safely use, store and throw away your medicines at www.disposemymeds.org.          This list is accurate as of: 10/13/17 11:59 PM.  Always use your most recent med list.                   Brand Name Dispense Instructions for use Diagnosis    aspirin 81 MG EC tablet      Take 81 mg by mouth daily        FLUoxetine 20 MG capsule    PROzac     Take by mouth daily        fluticasone 50 MCG/ACT spray    FLONASE    1 g    Spray 2 sprays into both nostrils daily    Seasonal allergic rhinitis       LORazepam 1 MG tablet    ATIVAN    30 tablet    Take 1 tablet (1 mg) by mouth every 8 hours as needed for anxiety    Anxiety, Acute intractable tension-type headache       polyethylene glycol 236 G suspension    GoLYTELY/NuLYTELY    4000 mL    Take 4,000  "mLs (4 L) by mouth once for 1 dose Refer to \"Getting Ready for a Colonoscopy\" instruction handout    Encounter for screening colonoscopy       TYLENOL PO      Take 1,000 mg by mouth every 6 hours as needed for mild pain or fever        venlafaxine 37.5 MG 24 hr capsule    EFFEXOR-XR    46 capsule    Take 1 capsule daily for 14 days, then take 2 capsules daily.    Major depressive disorder, recurrent episode, moderate (H), SANDRA (generalized anxiety disorder)       Vitamin D3 3000 UNITS Tabs     30 tablet    Take 3,000 Int'l Units by mouth daily    Vitamin D deficiency       zonisamide 25 MG capsule    Zonegran    90 capsule    Take 1 tablet (25 mg) once daily for 1 week, then 2 tablets once daily for 1 week, then 3 tablets once daily for 1 week, then 4 tablets once daily for 1 week.    Headache disorder         "

## 2017-10-13 NOTE — TELEPHONE ENCOUNTER
Pt calling to see if someone else can do this for her. She states she just got diagnosed with cancer.    Zarina [facility]

## 2017-10-13 NOTE — PROGRESS NOTES
DATE OF VISIT: Oct 12, 2017    REASON FOR REFERRAL: Metastatic melanoma.    CHIEF COMPLAINT:   Chief Complaint   Patient presents with     Oncology Clinic Visit     New Diagnosis Metastatic malignant melanoma       HISTORY OF PRESENT ILLNESS:   This is a 42-year-old female patient was recently noticed a lump in the left axilla. Subsequently she went on to have an ultrasound and biopsy which came back significant for metastatic melanoma and axillary lymph node. Patient was seen by dermatology, a primary lesion was not identified although a couple of biopsies were done which are pending at this time. A PET scan was done which demonstrated left axillary lymph node metastasis is without no distant sites of metastases. However, there was some uptake in the right side of the colon. She had ocular examination which ruled out ocular melanoma. Patient is here today to discuss management of malignant melanoma.    REVIEW OF SYSTEMS:   Constitutional: Negative for fever, chills, and night sweats.  Skin: negative.  Eyes: negative.  Ears/Nose/Throat: negative.  Respiratory: No shortness of breath, dyspnea on exertion, cough, or hemoptysis.  Cardiovascular: negative.  Gastrointestinal: negative.  Genitourinary: negative.  Musculoskeletal: negative.  Neurologic: Patient has a long history of migraine headache. She is under care of neurology.  Psychiatric: negative.  Hematologic/Lymphatic/Immunologic: negative.  Endocrine: negative.    PAST MEDICAL HISTORY:   Past Medical History:   Diagnosis Date     Abnormal MRI     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      Anxiety      Depression      Lumbago     left lower back pain     Malignant melanoma (H)      Mild persistent asthma      Prothrombin deficiency (H)     takes 81mg asa daily     Stroke (cerebrum) (H)     During      TIA (transient ischemic attack)        PAST SURGICAL HISTORY:   Past Surgical History:   Procedure Laterality Date     GYN  "SURGERY           REPAIR MOHS Left 2017    Procedure: REPAIR MOHS;  Left Upper Lid Moh's Reconstruction;  Surgeon: Kisha Bosch MD;  [facility]       ALLERGIES:   Allergies as of 10/12/2017 - Nicola as Reviewed 10/12/2017   Allergen Reaction Noted     Fentanyl Other (See Comments) 10/12/2017     Compazine [prochlorperazine] Fatigue 10/12/2017     Erythrocin Nausea and Vomiting 2008     Zithromax [azithromycin dihydrate] Diarrhea 2012       MEDICATIONS:   Current Outpatient Prescriptions   Medication Sig Dispense Refill     Acetaminophen (TYLENOL PO) Take 1,000 mg by mouth every 6 hours as needed for mild pain or fever       fluticasone (FLONASE) 50 MCG/ACT nasal spray Spray 2 sprays into both nostrils daily (Patient not taking: Reported on 10/13/2017) 1 g 3     polyethylene glycol (GOLYTELY/NULYTELY) 236 G suspension Take 4,000 mLs (4 L) by mouth once for 1 dose Refer to \"Getting Ready for a Colonoscopy\" instruction handout (Patient not taking: Reported on 10/13/2017) 4000 mL 0     FLUoxetine (PROZAC) 20 MG capsule Take by mouth daily       venlafaxine (EFFEXOR-XR) 37.5 MG 24 hr capsule Take 1 capsule daily for 14 days, then take 2 capsules daily. (Patient not taking: Reported on 10/13/2017) 46 capsule 1     Cholecalciferol (VITAMIN D3) 3000 UNITS TABS Take 3,000 Int'l Units by mouth daily (Patient not taking: Reported on 10/12/2017) 30 tablet 3     zonisamide (ZONEGRAN) 25 MG capsule Take 1 tablet (25 mg) once daily for 1 week, then 2 tablets once daily for 1 week, then 3 tablets once daily for 1 week, then 4 tablets once daily for 1 week. (Patient not taking: Reported on 10/12/2017) 90 capsule 1     aspirin 81 MG EC tablet Take 81 mg by mouth daily        LORazepam (ATIVAN) 1 MG tablet Take 1 tablet (1 mg) by mouth every 8 hours as needed for anxiety (Patient not taking: Reported on 10/13/2017) 30 tablet 0        FAMILY HISTORY:   Family History   Problem Relation Age of Onset "     CANCER Mother 45     lung     Neurologic Disorder Mother      Lipids Father      GASTROINTESTINAL DISEASE Father      Depression Father      CANCER Maternal Grandmother      Blood Disease Maternal Grandmother      Arthritis Maternal Grandmother      DIABETES Maternal Grandmother      Depression Maternal Grandmother      Macular Degeneration Maternal Grandmother      Glaucoma Maternal Grandmother      DIABETES Maternal Grandfather      CEREBROVASCULAR DISEASE Maternal Grandfather      Blood Disease Maternal Grandfather      HEART DISEASE Maternal Grandfather      Glaucoma Maternal Grandfather      CANCER Paternal Grandmother      Cancer - colorectal Paternal Grandmother      Respiratory Paternal Grandfather      Blood Disease Paternal Grandfather      HEART DISEASE Daughter      Asthma Daughter      Depression Sister        SOCIAL HISTORY:   Social History     Social History     Marital status:      Spouse name: N/A     Number of children: N/A     Years of education: N/A     Social History Main Topics     Smoking status: Passive Smoke Exposure - Never Smoker     Last attempt to quit: 3/20/1998     Smokeless tobacco: Never Used     Alcohol use No      Comment: occ     Drug use: No     Sexual activity: Yes     Partners: Male     Birth control/ protection: Surgical     Other Topics Concern     Parent/Sibling W/ Cabg, Mi Or Angioplasty Before 65f 55m? No     Social History Narrative    20 y.o- patient's mother   of lung cancer. She had to take care of her younger sister.    2012- patient's  had a heart attack with stents placed, followed by cardiac rehabilitation    2000 TO 2012  was in [facility] for depression    2013 patient's  went through alcohol rehabilitation at [place] inpatient            They have attended couple counseling a couple of times and patient went to the family program for chemical dependency.    Patient  "denies alcohol or drug use and herself            Has 2 children, girls ages 5 and 8    [employer] and also works for the [employer]. 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               PHYSICAL EXAMINATION:   /73 (BP Location: Right arm, Patient Position: Sitting, Cuff Size: Adult Large)  Pulse 82  Temp 97.8  F (36.6  C) (Tympanic)  Resp 16  Ht 1.6 m (5' 3\")  Wt 94 kg (207 lb 4.8 oz)  LMP 09/07/2017  SpO2 96%  Breastfeeding? No  BMI 36.72 kg/m2  Wt Readings from Last 10 Encounters:   10/13/17 94.6 kg (208 lb 8 oz)   10/12/17 93.9 kg (207 lb)   10/12/17 94 kg (207 lb 4.8 oz)   09/28/17 90.7 kg (200 lb)   09/12/17 94.3 kg (208 lb)   09/06/17 94.7 kg (208 lb 12.8 oz)   07/25/17 94.8 kg (209 lb)   07/17/17 94.8 kg (209 lb)   07/12/17 94.8 kg (209 lb)   06/15/17 95 kg (209 lb 6.4 oz)      ECOG performance status: 0  GENERAL APPEARANCE: Healthy, alert and in no acute distress.  HEENT: Sclerae anicteric. PERRLA. Oropharynx without ulcers, lesions, or thrush.  NECK: Supple. No asymmetry or masses.  LYMPHATICS: Enlarged lymph node in the left axilla measuring about 2 cm. No other palpable cervical, supraclavicular, axillary, or inguinal lymphadenopathy.  RESP: Lungs clear to auscultation bilaterally without rales, rhonchi or wheezes.  CARDIOVASCULAR: Regular rate and rhythm. Normal S1, S2; no S3 or S4. No murmur, gallop, or rub.  ABDOMEN: Soft, nontender. Bowel sounds normal. No palpable organomegaly or masses.  MUSCULOSKELETAL: Extremities without gross deformities noted. No edema of bilateral lower extremities.  SKIN: No suspicious lesions or rashes.  NEURO: Alert and oriented x 3. Cranial nerves II-XII grossly intact.  PSYCHIATRIC: Mentation and affect appear normal.    LABORATORY RESULTS:  [facility] Appointment on 10/05/2017   Component Date Value Ref Range Status     Copath Report 10/05/2017    Final                    Value:Patient Name: ELIZABETH MOREIRA  MR#: [id number]  Specimen " "#: [id number]  Collected: 10/5/2017  Received: 10/5/2017  Reported: 10/9/2017 16:28  Ordering Phy(s): JOLANTA LAROSE    For improved result formatting, select 'View Enhanced Report Format'  under Linked Documents section.    SPECIMEN(S):  Left axilla, 1 o'clock, 13 cm from nipple    FINAL DIAGNOSIS:  LYMPH NODE, LEFT AXILLA, 1:00, 13 CM FROM NIPPLE, ULTRASOUND-GUIDED  NEEDLE CORE BIOPSY:  - CONSISTENT WITH METASTATIC MALIGNANT MELANOMA, see comment.    COMMENT:  Sections show a malignant neoplasm composed of pleomorphic atypical  cells with vesicular nuclei, some bearing prominent nucleoli, arranged  in large nests. Mitoses are frequent and foci of necrosis are present.  Background lymphoid tissue is not identified.  A panel of  immunohistochemical stains are performed with appropriate control  reactions. The tumor cells are diffusely and strongly positive for S-100  and SOX-10, while only rare cells are positive for Melan-A. The                           lesional  cells are negative for HMB-45, tyrosinase, cytokeratin AE1/AE3, DESTINY-3,  p63, TTF-1 and .  Overall, the morphologic features and the  immunohistochemical profile are most consistent with metastatic  malignant melanoma. Molecular testing is in progress and the results  will be reported separately. The case was discussed with Dr. Larose on  10/9/17.    I have personally reviewed all specimens and/or slides, including the  listed special stains, and used them with my medical judgement to  determine or confirm the final diagnosis.    Electronically signed out by:    Samantha Guzmán M.D., [facility]    CLINICAL HISTORY:  The patient is a 42 year-old female who was found to have a 3.3 cm left  axillary mass. She has a previous diagnosis of basal cell carcinoma of  left eyelid ([id number]).  GROSS:  The specimen is received in formalin with proper patient identification,  labeled \"left axilla 1:00 13 cm from nipple\".  The specimen consists of  one tan " yellow core measuring 1.                          6 cm in length and 0.2 cm in diameter.  The specimen is wrapped entirely submitted in cassette 1.    The specimen is collected and placed in formalin at 1122 on 10/5/2017.  (Dictated by: Ana Schaefer 10/5/2017 01:54 PM)    MICROSCOPIC:  Microscopic examination was performed.    CPT Codes:  A: 77169-DJ1, 52402-ISS, 12209-WIW, 18799-EJR, 85083-OTY, 58563-CTJ,  21994-OGU, 49299-INY, 62837-NAV, 93101-YBW, SOH, 00249-TXI    TESTING LAB LOCATION:  [facility], [facility] [address] [place]   [address] [phone]    COLLECTION SITE:  Client: [facility]  Location: [facility] (B)           IMAGING RESULTS:  Recent Results (from the past 744 hour(s))   MA Diagnostic Bilateral w/Brianda    Narrative    MA DIAGNOSTIC BILATERAL W/ BRIANDA, US AXILLARY LEFT 10/4/2017 1:15 PM    HISTORY:  Palpable lump in left axilla.    COMPARISON:  None.    TECHNIQUE:  Bilateral digital mammography with CAD is performed as  well as DBT. Directed left axillary ultrasound is also done.    BREAST DENSITY: Scattered fibroglandular densities.    BILATERAL MAMMOGRAM: A partially imaged enlarged left axillary lymph  node is seen. The bilateral mammogram otherwise shows no suspicious  findings.    LEFT AXILLARY ULTRASOUND: Directed sonography to the site of the  patient's palpable complaint and mammographic finding shows an  abnormally enlarged lymph node the measures 3.3 cm. Ultrasound-guided  needle core biopsy is recommended at this time. This was discussed  with the patient. We will help her get set up for this.      Impression    IMPRESSION: BI-RADS CATEGORY: 4 - Suspicious Abnormality-Biopsy Should  Be Considered.    RECOMMENDED FOLLOW-UP: Biopsy.    SONIA BLANCO MD   US Axillary Left    Narrative    MA DIAGNOSTIC BILATERAL W/ BRIANDA, US AXILLARY LEFT 10/4/2017 1:15 PM    HISTORY:  Palpable lump in left  axilla.    COMPARISON:  None.    TECHNIQUE:  Bilateral digital mammography with CAD is performed as  well as DBT. Directed left axillary ultrasound is also done.    BREAST DENSITY: Scattered fibroglandular densities.    BILATERAL MAMMOGRAM: A partially imaged enlarged left axillary lymph  node is seen. The bilateral mammogram otherwise shows no suspicious  findings.    LEFT AXILLARY ULTRASOUND: Directed sonography to the site of the  patient's palpable complaint and mammographic finding shows an  abnormally enlarged lymph node the measures 3.3 cm. Ultrasound-guided  needle core biopsy is recommended at this time. This was discussed  with the patient. We will help her get set up for this.      Impression    IMPRESSION: BI-RADS CATEGORY: 4 - Suspicious Abnormality-Biopsy Should  Be Considered.    RECOMMENDED FOLLOW-UP: Biopsy.    SONIA BLANCO MD   US Axillary Right    Addendum: 10/10/2017    Addendum:    LYMPH NODE, LEFT AXILLA, 1:00, 13 CM FROM NIPPLE, ULTRASOUND-GUIDED  NEEDLE CORE BIOPSY:   - CONSISTENT WITH METASTATIC MALIGNANT MELANOMA    Concordant with imaging.    Recommendation:  Oncological and surgical consultation.  Patient is  already scheduled for a consultation with a dermatologist on  10/10/2017.    NATACHA VERA MD      Addendum: 10/10/2017    Addendum:    LYMPH NODE, LEFT AXILLA, 1:00, 13 CM FROM NIPPLE, ULTRASOUND-GUIDED  NEEDLE CORE BIOPSY:   - CONSISTENT WITH METASTATIC MALIGNANT MELANOMA    Concordant with imaging.    Recommendation:  Oncological and surgical consultation.  Patient is  already scheduled for a consultation with a dermatologist on  10/10/2017.    NATACHA VERA MD      Narrative    Ultrasound guided core needle left axillary lymph node biopsy.    Comparisons: Mammogram and ultrasound 10/4/2017    FINDINGS: Procedure, risks, benefits and alternatives were discussed  with the patient and the patient gave written and verbal consent.  Aseptic technique was utilized. Approximately 10 cc  of 1% lidocaine  were utilized for local anesthesia.  Under ultrasound guidance, a  12-gauge core needle biopsy system was utilized to sample lesion  located at the axilla. A biopsy marking clip was placed.  Pressure was  held over this area for approximately 15 minutes. A dressing was  placed and care instructions were discussed with the patient.    Biopsy clip deployment visualized in real-time under ultrasound.      Impression    IMPRESSION:    Uncomplicated ultrasound guided core needle biopsy of  the left axilla.    Procedure performed by Dr. Guerra under my supervision.  I, Bert Valerio MD, was present for the entire procedure. .    BERT VALERIO MD   US Biopsy Lymph Node Core    Addendum: 10/10/2017    Addendum:    LYMPH NODE, LEFT AXILLA, 1:00, 13 CM FROM NIPPLE, ULTRASOUND-GUIDED  NEEDLE CORE BIOPSY:   - CONSISTENT WITH METASTATIC MALIGNANT MELANOMA    Concordant with imaging.    Recommendation:  Oncological and surgical consultation.  Patient is  already scheduled for a consultation with a dermatologist on  10/10/2017.    NATACHA VERA MD      Addendum: 10/10/2017    Addendum:    LYMPH NODE, LEFT AXILLA, 1:00, 13 CM FROM NIPPLE, ULTRASOUND-GUIDED  NEEDLE CORE BIOPSY:   - CONSISTENT WITH METASTATIC MALIGNANT MELANOMA    Concordant with imaging.    Recommendation:  Oncological and surgical consultation.  Patient is  already scheduled for a consultation with a dermatologist on  10/10/2017.    NATACHA VERA MD      Narrative    Ultrasound guided core needle left axillary lymph node biopsy.    Comparisons: Mammogram and ultrasound 10/4/2017    FINDINGS: Procedure, risks, benefits and alternatives were discussed  with the patient and the patient gave written and verbal consent.  Aseptic technique was utilized. Approximately 10 cc of 1% lidocaine  were utilized for local anesthesia.  Under ultrasound guidance, a  12-gauge core needle biopsy system was utilized to sample lesion  located at the axilla. A biopsy marking  clip was placed.  Pressure was  held over this area for approximately 15 minutes. A dressing was  placed and care instructions were discussed with the patient.    Biopsy clip deployment visualized in real-time under ultrasound.      Impression    IMPRESSION:    Uncomplicated ultrasound guided core needle biopsy of  the left axilla.    Procedure performed by Dr. Guerra under my supervision.  I, Bert Valerio MD, was present for the entire procedure. .    BERT VALERIO MD   PET Oncology (Eyes to Thighs)    Narrative    PET/CT SKULL BASE TO MID THIGH LOCALIZATION WITHOUT IV CONTRAST    10/11/2017 4:55 PM     HISTORY: Malignant melanoma. Secondary malignant neoplasm of  unspecified site.    COMPARISON EXAMS:  SUBURBAN IMAGING: None.  FAIRVIEW: None.  OTHER: None.    TECHNIQUE: The patient was injected intravenously with 10.6 mCi of  F-18 FDG. A nondiagnostic noncontrast CT was performed for attenuation  correction purposes. A PET/CT scan was performed from the skull base  through the mid thigh. Blood glucose: 72 mg/dL. The CT, PET and fusion  images are then evaluated on a [employer] workstation. Radiation dose  for this scan was reduced using automated exposure control, adjustment  of the mA and/or kV according to patient size, or iterative  reconstruction technique.    FINDINGS: Normal physiologic uptake is identified within the salivary  glands, myocardium, kidneys, ureters and bladder.  Scattered areas of  physiologic bowel uptake are also present.    NECK:  Lymph nodes: No pathologic activity. No enlarged neck lymph nodes  identified.    Additional findings: None.    CHEST:  Lungs: No pathologic activity.    Lymph nodes: Large hypermetabolic left medial axillary lymph node  measuring 3.7 x 3.2 cm, series 3 image 196 (SUV max 38.4). Other  thoracic lymph nodes appear small without detectable abnormal  metabolism.    Additional findings: None.    ABDOMEN/PELVIS:  Hepatobiliary: No pathologic activity.    Spleen: No pathologic  activity.     Pancreas: No pathologic activity. There is atrophy of the pancreatic  body and tail.    Kidneys: No pathologic activity.     Adrenals: No pathologic activity.     Reproductive: No pathologic activity. There is non-hypermetabolic  fullness of the cervical region.    Gastrointestinal: No pathologic activity. CT is limited in assessment  of the colon and small bowel. There is some more pronounced tracer  uptake within the cecum and right colon in the remainder of the bowel.  This could just be physiologic.    Lymph nodes: No pathologic activity. No enlarged hypermetabolic lymph  node seen in the abdomen or pelvis. No inguinal adenopathy.    Additional findings: None.    Legs: No pathologic activity identified.    SKELETON:   No pathologic activity.      Impression    IMPRESSION:  1. Intensely hypermetabolic mass at the medial left axilla compatible  with malignant adenopathy.  2. Remainder of the scan shows no detected pathologic activity or  additional adenopathy.  3. Radiotracer uptake within the right colon is more pronounced than  the remainder of the bowel. This could just be physiologic. However,  if there is clinical concern, direct visualization would aid in  further assessment.  4. Non-hypermetabolic prominence of the cervical region. Correlate  with physical exam.    BHAVIN GARCIA MD       ASSESSMENT AND PLAN:    (C79.9) Metastatic malignant melanoma (H)  (primary encounter diagnosis)  I reviewed with the patient today the natural history of malignant melanoma. We talked about staging, biology, management and prognosis. We talked about adjuvant therapy for malignant melanoma. We specifically discussed immunotherapy. The patient is scheduled for surgical resection of axillary lymph node. I well see the patient following surgical resection to discuss with her further when the final pathology results are available. We will also arrange for a colonoscopy to clarify the abnormalities and increased  uptake seen on the PET scan.     Plan: [facility] REFERRAL, GASTROENTEROLOGY ADULT    REF PROCEDURE ONLY      The patient is ready to learn, no apparent learning barriers were identified, Diagnosis and treatment plans were explained to the patient. The patient expressed understanding of the content. The patient questions were answered to her satisfaction.    Kathy Richards MD    Chart documentation with Dragon Voice recognition Software. Although reviewed after completion, some words and grammatical errors may remain.

## 2017-10-13 NOTE — TELEPHONE ENCOUNTER
Ativan 1mg      Last Written Prescription Date:  12/6/16  Last Fill Quantity: 30,   # refills: 0  Last Office Visit with [facility], [facility] or [facility] prescribing provider: 9/6/17  Future Office visit:    Next 5 appointments (look out 90 days)     Nov 02, 2017 10:30 AM CDT   Return Visit with Kathy Richards MD   [facility] ([facility])    [facility]  [address]  [place] [place] [address]   [phone]            Jan 09, 2018 10:00 AM CST   Return Visit with Lowell Bullock MD   [facility]    [address] [address]   [phone]                   Routing refill request to provider for review/approval because:  Drug not on the [facility], [facility] or [facility] refill protocol or controlled substance            Thanks,  Elisa Harman, Technician (float)  [facility]

## 2017-10-13 NOTE — TELEPHONE ENCOUNTER
Dr. Stephens, Patient has Fentanyl on allergy list. She states that reaction was profuse sweating, unsure of any other reactions. Notified [facility] of past reaction as well.   Thanks Celeste MARR    Patient scheduled for Colonoscopy    Indication for procedure. Metastatic malignant melanoma, Abnormal positron emission tomography (PET) scan    Referring Provider. Kathy Richards MD, Lowell Bullock MD    ? Not Needed    Arrival time verified? Yes, 0800    Facility location verified? Yes, [address] [facility] [address]    Instructions given regarding prep and procedure    Prep Type Golytely    Are you taking any anticoagulants or blood thinners? Aspirin    Instructions given? Patient states that she has not been taking aspirin regularly    Electronic implanted devices? No    Pre procedure teaching completed? Yes    Transportation from procedure? Sister or Friend    H&P / Pre op physical completed? N/A      Noted Fentanyl on allergy list. Patient states that she believes her reaction was profuse sweating, unsure of any other reactions with this. Notified physician and unit preforming procedure of past reaction to medication.

## 2017-10-13 NOTE — TELEPHONE ENCOUNTER
Please see note below  Please advise on refill of Ativan     Routing to provider.    Yanet MOLINA Rn

## 2017-10-15 ENCOUNTER — NURSE TRIAGE (OUTPATIENT)
Dept: NURSING | Facility: CLINIC | Age: 42
End: 2017-10-15

## 2017-10-15 NOTE — TELEPHONE ENCOUNTER
Patient is calling stating her daughter came home from a Birthday Party today with spots on hands, mouth. Possible exposure to Hand Foot and Mouth at school.  Patient stating she is scheduled for Oncology Surgery in 1 week and is concerned with exposure. Reviewed Center for Disease Control Information with caller on contagiousness of Hand Foot and Mouth.  Patient denies symptoms and will have daughter seen for confirmation.    Doretha Riley RN  [facility]

## 2017-10-16 LAB — COPATH REPORT: NORMAL

## 2017-10-17 ENCOUNTER — TELEPHONE (OUTPATIENT)
Dept: SPIRITUAL SERVICES | Facility: CLINIC | Age: 42
End: 2017-10-17

## 2017-10-17 LAB — COPATH REPORT: NORMAL

## 2017-10-17 NOTE — TELEPHONE ENCOUNTER
SPIRITUAL HEALTH SERVICES Phone Encounter Note  [facility]    Reason for Contact: Doretha Fernandez was contacted by phone per reported concerns about spiritual well-being on the Oncology Distress Screening tool.    Intervention: Doretha welcomed call and agreed that she is dealing with some challenges.  She stated she was having surgery next week and then would be seeing Dr Richards on 11/2.  I offered support during those appointments or at any time she sensed the need for support.  She expressed appreciation in knowing that.    Plan: I will be available in future at Doretha's request.      Nicola Watkins M.A., [facility]  Staff [facility]  Office [phone]  Cell [phone]  Pager [phone]

## 2017-10-18 ENCOUNTER — HOSPITAL ENCOUNTER (OUTPATIENT)
Facility: AMBULATORY SURGERY CENTER | Age: 42
End: 2017-10-18
Attending: INTERNAL MEDICINE

## 2017-10-18 ENCOUNTER — SURGERY (OUTPATIENT)
Age: 42
End: 2017-10-18

## 2017-10-18 VITALS
HEIGHT: 63 IN | OXYGEN SATURATION: 94 % | DIASTOLIC BLOOD PRESSURE: 55 MMHG | RESPIRATION RATE: 16 BRPM | TEMPERATURE: 97.1 F | SYSTOLIC BLOOD PRESSURE: 97 MMHG | WEIGHT: 208.8 LBS | BODY MASS INDEX: 37 KG/M2

## 2017-10-18 LAB
COLONOSCOPY: NORMAL
HCG UR QL: NEGATIVE
INTERNAL QC OK POCT: YES

## 2017-10-18 RX ORDER — DIPHENHYDRAMINE HYDROCHLORIDE 50 MG/ML
INJECTION INTRAMUSCULAR; INTRAVENOUS PRN
Status: DISCONTINUED | OUTPATIENT
Start: 2017-10-18 | End: 2017-10-18 | Stop reason: HOSPADM

## 2017-10-18 RX ORDER — ONDANSETRON 2 MG/ML
4 INJECTION INTRAMUSCULAR; INTRAVENOUS
Status: DISCONTINUED | OUTPATIENT
Start: 2017-10-18 | End: 2017-10-19 | Stop reason: HOSPADM

## 2017-10-18 RX ORDER — MEPERIDINE HYDROCHLORIDE 25 MG/ML
25 INJECTION INTRAMUSCULAR; INTRAVENOUS; SUBCUTANEOUS
Status: DISCONTINUED | OUTPATIENT
Start: 2017-10-18 | End: 2017-10-19 | Stop reason: HOSPADM

## 2017-10-18 RX ORDER — CEFAZOLIN SODIUM 1 G/3ML
1 INJECTION, POWDER, FOR SOLUTION INTRAMUSCULAR; INTRAVENOUS SEE ADMIN INSTRUCTIONS
Status: DISCONTINUED | OUTPATIENT
Start: 2017-10-18 | End: 2017-10-19 | Stop reason: HOSPADM

## 2017-10-18 RX ORDER — LIDOCAINE 40 MG/G
CREAM TOPICAL
Status: DISCONTINUED | OUTPATIENT
Start: 2017-10-18 | End: 2017-10-19 | Stop reason: HOSPADM

## 2017-10-18 RX ADMIN — DIPHENHYDRAMINE HYDROCHLORIDE 50 MG: 50 INJECTION INTRAMUSCULAR; INTRAVENOUS at 09:18

## 2017-10-18 RX ADMIN — MEPERIDINE HYDROCHLORIDE 12.5 MG: 25 INJECTION INTRAMUSCULAR; INTRAVENOUS; SUBCUTANEOUS at 09:33

## 2017-10-18 RX ADMIN — MEPERIDINE HYDROCHLORIDE 12.5 MG: 25 INJECTION INTRAMUSCULAR; INTRAVENOUS; SUBCUTANEOUS at 10:05

## 2017-10-18 RX ADMIN — MEPERIDINE HYDROCHLORIDE 12.5 MG: 25 INJECTION INTRAMUSCULAR; INTRAVENOUS; SUBCUTANEOUS at 10:19

## 2017-10-18 RX ADMIN — MEPERIDINE HYDROCHLORIDE 12.5 MG: 25 INJECTION INTRAMUSCULAR; INTRAVENOUS; SUBCUTANEOUS at 09:28

## 2017-10-18 RX ADMIN — MEPERIDINE HYDROCHLORIDE 12.5 MG: 25 INJECTION INTRAMUSCULAR; INTRAVENOUS; SUBCUTANEOUS at 09:42

## 2017-10-20 ENCOUNTER — OFFICE VISIT (OUTPATIENT)
Dept: FAMILY MEDICINE | Facility: CLINIC | Age: 42
End: 2017-10-20
Payer: COMMERCIAL

## 2017-10-20 VITALS
HEIGHT: 63 IN | SYSTOLIC BLOOD PRESSURE: 134 MMHG | RESPIRATION RATE: 18 BRPM | DIASTOLIC BLOOD PRESSURE: 100 MMHG | HEART RATE: 109 BPM

## 2017-10-20 DIAGNOSIS — F41.1 GAD (GENERALIZED ANXIETY DISORDER): ICD-10-CM

## 2017-10-20 DIAGNOSIS — F43.0 ACUTE REACTION TO STRESS: ICD-10-CM

## 2017-10-20 DIAGNOSIS — C43.9 METASTATIC MALIGNANT MELANOMA (H): ICD-10-CM

## 2017-10-20 DIAGNOSIS — Z01.818 PREOP GENERAL PHYSICAL EXAM: Primary | ICD-10-CM

## 2017-10-20 PROCEDURE — 99215 OFFICE O/P EST HI 40 MIN: CPT | Performed by: FAMILY MEDICINE

## 2017-10-20 NOTE — PATIENT INSTRUCTIONS
Thank you for choosing [facility].  You may be receiving a survey in the mail from Amina Irene regarding your visit today.  Please take a few minutes to complete and return the survey to let us know how we are doing.      Our Clinic hours are:  Mondays    7:20 am - 7 pm  Tues -  Fri  7:20 am - 5 pm    Clinic Phone: [phone]    The clinic lab opens at 7:30 am Mon - Fri and appointments are required.    [facility]. [phone]  Monday-Thursday 8 am - 7pm  Tues/Wed/Fri 8 am - 5:30 pm         Before Your Surgery      Call your surgeon if there is any change in your health. This includes signs of a cold or flu (such as a sore throat, runny nose, cough, rash or fever).    Do not smoke, drink alcohol or take over the counter medicine (unless your surgeon or primary care doctor tells you to) for the 24 hours before and after surgery.    If you take prescribed drugs: Follow your doctor s orders about which medicines to take and which to stop until after surgery.    Eating and drinking prior to surgery: follow the instructions from your surgeon    Take a shower or bath the night before surgery. Use the soap your surgeon gave you to gently clean your skin. If you do not have soap from your surgeon, use your regular soap. Do not shave or scrub the surgery site.  Wear clean pajamas and have clean sheets on your bed.

## 2017-10-20 NOTE — PROGRESS NOTES
[facility]  [address]  [facility] [address]  [phone]  Dept: [phone]    PRE-OP EVALUATION:  Today's date: 10/20/2017    Doretha Fernandez (: 1975) presents for pre-operative evaluation assessment as requested by Dr. Cool.  She requires evaluation and anesthesia risk assessment prior to undergoing surgery/procedure for treatment of lymph node .  Proposed procedure: Left Axillary Lymph Node Dissection    Date of Surgery/ Procedure: 10/23/17  Time of Surgery/ Procedure: 9:45 AM  Hospital/Surgical Facility: [facility]  Primary Physician: Anna Naidu  Type of Anesthesia Anticipated: General    Patient has a Health Care Directive or Living Will:  NO    1. YES - Do you have a history of heart attack, stroke, stent, bypass or surgery on an artery in the head, neck, heart or legs?  2. NO - Do you ever have any pain or discomfort in your chest?  3. NO - Do you have a history of  Heart Failure?  4. NO - Are you troubled by shortness of breath when: walking on the level, up a slight hill or at night?  5. NO - Do you currently have a cold, bronchitis or other respiratory infection?  6. NO - Do you have a cough, shortness of breath or wheezing?  7. YES - Do you sometimes get pains in the calves of your legs when you walk?  8. YES - Do you or anyone in your family have previous history of blood clots?  9. YES - Do you or does anyone in your family have a serious bleeding problem such as prolonged bleeding following surgeries or cuts?  10. YES - Have you ever had problems with anemia or been told to take iron pills?  11. NO - Have you had any abnormal blood loss such as black, tarry or bloody stools, or abnormal vaginal bleeding?  12. NO - Have you ever had a blood transfusion?  13. NO - Have you or any of your relatives ever had problems with anesthesia?  14. NO - Do you have sleep apnea, excessive snoring or daytime drowsiness? fatigue  15. NO - Do you have any prosthetic heart  valves?  16. NO - Do you have prosthetic joints?  17. NO - Is there any chance that you may be pregnant?        HPI:                                                      Brief HPI related to upcoming procedure:   Patient noted a lump in her left axilla in early September.  Biopsy of this shows metastatic melanoma of the lymph node.  There is no obvious primary melanoma, biopsies of suspicious lesions were negative.    No ocular melanoma.  She did have a recent basal cell of her left eyelid.   She had a PET CT showing the axillary metastasis but no other obvious sites of disease.  She had a colonoscopy earlier this week that was normal.        DEPRESSION/Anxiety - Patient has a long history of Depression of moderate severity requiring medication for control with recent symptoms being much worse recently with the diagnosis.. Currently in the process of weaning off the fluoxetine and gradually increasing her Venlafaxine.                                                                                                                                                                                                             MEDICAL HISTORY:                                                    Patient Active Problem List    Diagnosis Date Noted     Malignant melanoma of left upper extremity including shoulder (H) 10/12/2017     Priority: Medium     Metastatic malignant melanoma (H) 10/10/2017     Priority: Medium     Prothrombin mutation (H) 04/05/2017     Priority: Medium     On daily aspirin 81 mg per hematology's recommendations from 2008       Vision changes 04/01/2017     Priority: Medium     Acute non intractable tension-type headache 04/01/2017     Priority: Medium     Mild intermittent asthma without complication 11/08/2013     Priority: Medium     Mild major depression (H) 06/24/2013     Priority: Medium     Anxiety state 09/13/2012     Priority: Medium     Problem list name updated by automated process. Provider to  review       Esophageal reflux 2012     Priority: Medium     DUB (dysfunctional uterine bleeding) 2011     Priority: Medium     CARDIOVASCULAR SCREENING; LDL GOAL LESS THAN 160 2011     Priority: Low      Past Medical History:   Diagnosis Date     Abnormal MRI     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      Anxiety      Depression      Lumbago     left lower back pain     Malignant melanoma (H)      Mild persistent asthma      Prothrombin deficiency (H)     takes 81mg asa daily     Stroke (cerebrum) (H)     During      TIA (transient ischemic attack)      Past Surgical History:   Procedure Laterality Date     COLONOSCOPY N/A 10/18/2017    Procedure: COLONOSCOPY;  Colon;  Surgeon: Debbie Stephens MD;  Location: [facility] OR     GYN SURGERY           REPAIR MOHS Left 2017    Procedure: REPAIR MOHS;  Left Upper Lid Moh's Reconstruction;  Surgeon: Kisha Bosch MD;  Location:  OR     Current Outpatient Prescriptions   Medication Sig Dispense Refill     FLUoxetine (PROZAC) 20 MG capsule Take by mouth daily       LORazepam (ATIVAN) 1 MG tablet Take 1 tablet (1 mg) by mouth every 8 hours as needed for anxiety 30 tablet 0     venlafaxine (EFFEXOR-XR) 37.5 MG 24 hr capsule Take 1 capsule daily for 14 days, then take 2 capsules daily. 46 capsule 1     Cholecalciferol (VITAMIN D3) 3000 UNITS TABS Take 3,000 Int'l Units by mouth daily 30 tablet 3     zonisamide (ZONEGRAN) 25 MG capsule Take 1 tablet (25 mg) once daily for 1 week, then 2 tablets once daily for 1 week, then 3 tablets once daily for 1 week, then 4 tablets once daily for 1 week. (Patient not taking: Reported on 10/12/2017) 90 capsule 1     aspirin 81 MG EC tablet Take 81 mg by mouth daily        Acetaminophen (TYLENOL PO) Take 1,000 mg by mouth every 6 hours as needed for mild pain or fever       fluticasone (FLONASE) 50 MCG/ACT nasal spray Spray 2 sprays into both nostrils daily (Patient not taking:  "Reported on 10/13/2017) 1 g 3     OTC products: None, except as noted above    Allergies   Allergen Reactions     Compazine [Prochlorperazine] Fatigue     Fentanyl Other (See Comments)     sweating     Erythrocin Nausea and Vomiting     Zithromax [Azithromycin Dihydrate] Diarrhea      Latex Allergy: NO    Social History   Substance Use Topics     Smoking status: Passive Smoke Exposure - Never Smoker     Last attempt to quit: 3/20/1998     Smokeless tobacco: Never Used     Alcohol use No      Comment: occ     History   Drug Use No       REVIEW OF SYSTEMS:                                                    CONSTITUTIONAL:POSITIVE  for night sweats  E/M: NEGATIVE for ear, mouth and throat problems  R: NEGATIVE for significant cough or SOB  CV: NEGATIVE for chest pain, palpitations or peripheral edema  PSYCH: tearful, anxious    EXAM:                                                    BP (!) 134/100  Pulse 109  Resp 18  Ht 5' 3\" (1.6 m)  LMP 10/07/2017  GENERAL APPEARANCE: healthy, alert and no distress  HENT: ear canals and TM's normal and nose and mouth without ulcers or lesions  RESP: lungs clear to auscultation - no rales, rhonchi or wheezes  CV: regular rate and rhythm, normal S1 S2, no S3 or S4 and no murmur, click or rub   ABDOMEN: soft, nontender, no HSM or masses and bowel sounds normal  NEURO: Normal strength and tone, sensory exam grossly normal, mentation intact and speech normal  PSYCH: mentation appears normal, crying and anxious    DIAGNOSTICS:                                                    No labs or EKG required for low risk surgery (cataract, skin procedure, breast biopsy, etc)    Recent Labs   Lab Test  10/04/17   1411  06/05/17   1432   04/01/17   1420   11/19/10   1720   HGB  11.7  12.3   < >  12.1   < >  13.0   PLT  272  294   < >  247   < >  246   INR   --    --    --   0.92   --   0.93   NA  141  139   < >  138   < >  138   POTASSIUM  3.9  3.8   < >  3.8   < >  4.0   CR  0.92  0.71   < >  " 0.66   < >  0.68    < > = values in this interval not displayed.        IMPRESSION:                                                    Reason for surgery/procedure: left axillary lymph node with metastatic melanoma, no known primary  Diagnosis/reason for consult: preoperative evaluation and medical risk assessment    The proposed surgical procedure is considered LOW risk.    REVISED CARDIAC RISK INDEX  The patient has the following serious cardiovascular risks for perioperative complications such as (MI, PE, VFib and 3  AV Block):  No serious cardiac risks  INTERPRETATION: 0 risks: Class I (very low risk - 0.4% complication rate)    The patient has the following additional risks for perioperative complications:  No identified additional risks      ICD-10-CM    1. Preop general physical exam Z01.818    2. Metastatic malignant melanoma (H) C79.9 MENTAL HEALTH REFERRAL   3. SANDRA (generalized anxiety disorder) F41.1    4. Acute reaction to stress F43.0      Patient has lymph node dissection next week and follow up already scheduled with Dr. Richards and the oncology team at [place] in the next few weeks.     Strongly recommend she also start counseling for her depression/anxiety and dealing with this new diagnosis.      RECOMMENDATIONS:                                                          --Patient is to take all scheduled medications on the day of surgery EXCEPT for modifications listed below.    APPROVAL GIVEN to proceed with proposed procedure, without further diagnostic evaluation       Signed Electronically by: Anna Naidu MD    Copy of this evaluation report is provided to requesting physician.    [facility] Preop Guidelines

## 2017-10-20 NOTE — NURSING NOTE
"Chief Complaint   Patient presents with     Pre-Op Exam       Initial BP (!) 134/100  Pulse 109  Resp 18  Ht 5' 3\" (1.6 m)  LMP 10/07/2017 Estimated body mass index is 37 kg/(m^2) as calculated from the following:    Height as of 10/18/17: 5' 2.99\" (1.6 m).    Weight as of 10/18/17: 208 lb 12.8 oz (94.7 kg).  Medication Reconciliation: complete    "

## 2017-10-20 NOTE — MR AVS SNAPSHOT
After Visit Summary   10/20/2017    Doretha Fernandez    MRN: [id number]           Patient Information     Date Of Birth          1975        Visit Information        Provider Department      10/20/2017 2:00 PM Anna Naidu MD [facility]        Today's Diagnoses     Preop general physical exam    -  1    Metastatic malignant melanoma (H)        SANDRA (generalized anxiety disorder)          Care Instructions          Thank you for choosing [facility].  You may be receiving a survey in the mail from [employer] regarding your visit today.  Please take a few minutes to complete and return the survey to let us know how we are doing.      Our Clinic hours are:  Mondays    7:20 am - 7 pm  Tues -  Fri  7:20 am - 5 pm    Clinic Phone: [phone]    The clinic lab opens at 7:30 am Mon - Fri and appointments are required.    [facility]. [phone]  Monday-Thursday 8 am - 7pm  Tues/Wed/Fri 8 am - 5:30 pm         Before Your Surgery      Call your surgeon if there is any change in your health. This includes signs of a cold or flu (such as a sore throat, runny nose, cough, rash or fever).    Do not smoke, drink alcohol or take over the counter medicine (unless your surgeon or primary care doctor tells you to) for the 24 hours before and after surgery.    If you take prescribed drugs: Follow your doctor s orders about which medicines to take and which to stop until after surgery.    Eating and drinking prior to surgery: follow the instructions from your surgeon    Take a shower or bath the night before surgery. Use the soap your surgeon gave you to gently clean your skin. If you do not have soap from your surgeon, use your regular soap. Do not shave or scrub the surgery site.  Wear clean pajamas and have clean sheets on your bed.           Follow-ups after your visit        Additional Services     MENTAL HEALTH REFERRAL       Your provider has referred you to:  [facility]: [facility] - Counseling (Individual/Couples/Family) - [facility] [phone]   [email or web address]   *Patient will be contacted by [facility] scheduling partner, [facility]), to schedule an appointment.  Patients may also call [facility] to schedule.    All scheduling is subject to the client's specific insurance plan & benefits, provider/location availability, and provider clinical specialities.  Please arrive 15 minutes early for your first appointment and bring your completed paperwork.    Please be aware that coverage of these services is subject to the terms and limitations of your health insurance plan.  Call member services at your health plan with any benefit or coverage questions.                  Your next 10 appointments already scheduled     Oct 23, 2017   Procedure with Laurent Cool MD   [facility], Same Day Surgery (--)    [address] [address]   [phone]            Nov 02, 2017 10:30 AM CDT   Return Visit with Kathy Richards MD   [facility] ([facility])    [facility]  [address]  [facility] [address]   [phone]            Nov 15, 2017  2:00 PM CST   (Arrive by 1:45 PM)   Return Visit with Bairon Miranda MD   [facility] ([facility])    [address] [place] [address]  [address] [facility] [address]   [phone]            Jan 09, 2018 10:00 AM CST   Return Visit with Lowell Bullock MD   [facility] ([facility])    [address] [facility] [address]   [phone]              Who to contact     If you have questions or need follow up information about today's clinic visit or your schedule please contact [facility] directly at [phone].  Normal or non-critical lab and imaging results will  "be communicated to you by MyChart, letter or phone within 4 business days after the clinic has received the results. If you do not hear from us within 7 days, please contact the clinic through [employer] or phone. If you have a critical or abnormal lab result, we will notify you by phone as soon as possible.  Submit refill requests through [employer] or call your pharmacy and they will forward the refill request to us. Please allow 3 business days for your refill to be completed.          Additional Information About Your Visit        [employer] Information     [employer] gives you secure access to your electronic health record. If you see a primary care provider, you can also send messages to your care team and make appointments. If you have questions, please call your primary care clinic.  If you do not have a primary care provider, please call [phone] and they will assist you.        Care EveryWhere ID     This is your Care EveryWhere ID. This could be used by other organizations to access your [facility] medical records  [id number]        Your Vitals Were     Pulse Respirations Height Last Period          109 18 5' 3\" (1.6 m) 10/07/2017         Blood Pressure from Last 3 Encounters:   10/20/17 (!) 134/100   10/18/17 97/55   10/13/17 113/75    Weight from Last 3 Encounters:   10/18/17 208 lb 12.8 oz (94.7 kg)   10/13/17 208 lb 8 oz (94.6 kg)   10/12/17 207 lb (93.9 kg)              We Performed the Following     MENTAL HEALTH REFERRAL        Primary Care Provider Office Phone # Fax #    Anna Naidu [phone] [address] [facility] [address] [address]        Equal Access to Services     [facility] AH: Hadii aad ku hadasho Soomaali, waaxda luqadaha, qaybta kaalmada chris, pancho castillo. So [facility] [phone].    ATENCIÓN: Si habla español, tiene a mcdonnell disposición servicios gratuitos de asistencia lingüística. Llame al [phone].    We comply with applicable " federal civil rights laws and [place] laws. We do not discriminate on the basis of race, color, national origin, age, disability, sex, sexual orientation, or gender identity.            Thank you!     Thank you for choosing [facility]  for your care. Our goal is always to provide you with excellent care. Hearing back from our patients is one way we can continue to improve our services. Please take a few minutes to complete the written survey that you may receive in the mail after your visit with us. Thank you!             Your Updated Medication List - Protect others around you: Learn how to safely use, store and throw away your medicines at www.disposemymeds.org.          This list is accurate as of: 10/20/17  2:46 PM.  Always use your most recent med list.                   Brand Name Dispense Instructions for use Diagnosis    aspirin 81 MG EC tablet      Take 81 mg by mouth daily        FLUoxetine 20 MG capsule    PROzac     Take by mouth daily        fluticasone 50 MCG/ACT spray    FLONASE    1 g    Spray 2 sprays into both nostrils daily    Seasonal allergic rhinitis       LORazepam 1 MG tablet    ATIVAN    30 tablet    Take 1 tablet (1 mg) by mouth every 8 hours as needed for anxiety    Anxiety, Acute intractable tension-type headache       TYLENOL PO      Take 1,000 mg by mouth every 6 hours as needed for mild pain or fever        venlafaxine 37.5 MG 24 hr capsule    EFFEXOR-XR    46 capsule    Take 1 capsule daily for 14 days, then take 2 capsules daily.    Major depressive disorder, recurrent episode, moderate (H), SANDRA (generalized anxiety disorder)       Vitamin D3 3000 UNITS Tabs     30 tablet    Take 3,000 Int'l Units by mouth daily    Vitamin D deficiency       zonisamide 25 MG capsule    Zonegran    90 capsule    Take 1 tablet (25 mg) once daily for 1 week, then 2 tablets once daily for 1 week, then 3 tablets once daily for 1 week, then 4 tablets once daily for 1 week.    Headache  disorder

## 2017-10-23 ENCOUNTER — ANESTHESIA (OUTPATIENT)
Dept: SURGERY | Facility: CLINIC | Age: 42
End: 2017-10-23
Payer: COMMERCIAL

## 2017-10-23 ENCOUNTER — HOSPITAL ENCOUNTER (OUTPATIENT)
Facility: CLINIC | Age: 42
Setting detail: OBSERVATION
Discharge: HOME | End: 2017-10-24
Attending: SURGERY | Admitting: SURGERY
Payer: COMMERCIAL

## 2017-10-23 ENCOUNTER — SURGERY (OUTPATIENT)
Age: 42
End: 2017-10-23

## 2017-10-23 ENCOUNTER — ANESTHESIA EVENT (OUTPATIENT)
Dept: SURGERY | Facility: CLINIC | Age: 42
End: 2017-10-23
Payer: COMMERCIAL

## 2017-10-23 DIAGNOSIS — G89.18 POSTOPERATIVE PAIN: ICD-10-CM

## 2017-10-23 DIAGNOSIS — C43.62 MALIGNANT MELANOMA OF LEFT UPPER EXTREMITY INCLUDING SHOULDER (H): Primary | ICD-10-CM

## 2017-10-23 PROBLEM — C43.9 MELANOMA (H): Status: ACTIVE | Noted: 2017-10-23

## 2017-10-23 LAB
GLUCOSE BLDC GLUCOMTR-MCNC: 85 MG/DL (ref 70–99)
HCG UR QL: NEGATIVE

## 2017-10-23 PROCEDURE — 36000053 ZZH SURGERY LEVEL 2 EA 15 ADDTL MIN - UMMC: Performed by: SURGERY

## 2017-10-23 PROCEDURE — 40000170 ZZH STATISTIC PRE-PROCEDURE ASSESSMENT II: Performed by: SURGERY

## 2017-10-23 PROCEDURE — 37000009 ZZH ANESTHESIA TECHNICAL FEE, EACH ADDTL 15 MIN: Performed by: SURGERY

## 2017-10-23 PROCEDURE — 27210794 ZZH OR GENERAL SUPPLY STERILE: Performed by: SURGERY

## 2017-10-23 PROCEDURE — 25000125 ZZHC RX 250: Performed by: SURGERY

## 2017-10-23 PROCEDURE — 25000128 H RX IP 250 OP 636: Performed by: SURGERY

## 2017-10-23 PROCEDURE — 00000146 ZZHCL STATISTIC GLUCOSE BY METER IP

## 2017-10-23 PROCEDURE — 40000141 ZZH STATISTIC PERIPHERAL IV START W/O US GUIDANCE

## 2017-10-23 PROCEDURE — 25000128 H RX IP 250 OP 636: Performed by: ANESTHESIOLOGY

## 2017-10-23 PROCEDURE — 27210995 ZZH RX 272: Performed by: SURGERY

## 2017-10-23 PROCEDURE — 88341 IMHCHEM/IMCYTCHM EA ADD ANTB: CPT | Performed by: SURGERY

## 2017-10-23 PROCEDURE — 81025 URINE PREGNANCY TEST: CPT | Performed by: ANESTHESIOLOGY

## 2017-10-23 PROCEDURE — 25000132 ZZH RX MED GY IP 250 OP 250 PS 637: Performed by: SURGERY

## 2017-10-23 PROCEDURE — 25000128 H RX IP 250 OP 636: Performed by: NURSE ANESTHETIST, CERTIFIED REGISTERED

## 2017-10-23 PROCEDURE — S0020 INJECTION, BUPIVICAINE HYDRO: HCPCS | Performed by: SURGERY

## 2017-10-23 PROCEDURE — G0378 HOSPITAL OBSERVATION PER HR: HCPCS

## 2017-10-23 PROCEDURE — 36000051 ZZH SURGERY LEVEL 2 1ST 30 MIN - UMMC: Performed by: SURGERY

## 2017-10-23 PROCEDURE — 25000566 ZZH SEVOFLURANE, EA 15 MIN: Performed by: SURGERY

## 2017-10-23 PROCEDURE — 25000125 ZZHC RX 250: Performed by: NURSE ANESTHETIST, CERTIFIED REGISTERED

## 2017-10-23 PROCEDURE — 88307 TISSUE EXAM BY PATHOLOGIST: CPT | Performed by: SURGERY

## 2017-10-23 PROCEDURE — 37000008 ZZH ANESTHESIA TECHNICAL FEE, 1ST 30 MIN: Performed by: SURGERY

## 2017-10-23 PROCEDURE — 88342 IMHCHEM/IMCYTCHM 1ST ANTB: CPT | Performed by: SURGERY

## 2017-10-23 PROCEDURE — C9399 UNCLASSIFIED DRUGS OR BIOLOG: HCPCS | Performed by: NURSE ANESTHETIST, CERTIFIED REGISTERED

## 2017-10-23 PROCEDURE — 71000015 ZZH RECOVERY PHASE 1 LEVEL 2 EA ADDTL HR: Performed by: SURGERY

## 2017-10-23 PROCEDURE — 71000014 ZZH RECOVERY PHASE 1 LEVEL 2 FIRST HR: Performed by: SURGERY

## 2017-10-23 RX ORDER — ALBUTEROL SULFATE 0.83 MG/ML
2.5 SOLUTION RESPIRATORY (INHALATION) EVERY 4 HOURS PRN
Status: DISCONTINUED | OUTPATIENT
Start: 2017-10-23 | End: 2017-10-23 | Stop reason: HOSPADM

## 2017-10-23 RX ORDER — OXYCODONE HYDROCHLORIDE 5 MG/1
5-10 TABLET ORAL
Status: COMPLETED | OUTPATIENT
Start: 2017-10-23 | End: 2017-10-23

## 2017-10-23 RX ORDER — SODIUM CHLORIDE, SODIUM LACTATE, POTASSIUM CHLORIDE, CALCIUM CHLORIDE 600; 310; 30; 20 MG/100ML; MG/100ML; MG/100ML; MG/100ML
INJECTION, SOLUTION INTRAVENOUS CONTINUOUS
Status: DISCONTINUED | OUTPATIENT
Start: 2017-10-23 | End: 2017-10-23 | Stop reason: HOSPADM

## 2017-10-23 RX ORDER — GLYCOPYRROLATE 0.2 MG/ML
INJECTION, SOLUTION INTRAMUSCULAR; INTRAVENOUS PRN
Status: DISCONTINUED | OUTPATIENT
Start: 2017-10-23 | End: 2017-10-23

## 2017-10-23 RX ORDER — HYDROMORPHONE HYDROCHLORIDE 1 MG/ML
.3-.5 INJECTION, SOLUTION INTRAMUSCULAR; INTRAVENOUS; SUBCUTANEOUS
Status: DISCONTINUED | OUTPATIENT
Start: 2017-10-23 | End: 2017-10-23 | Stop reason: HOSPADM

## 2017-10-23 RX ORDER — CEFAZOLIN SODIUM 2 G/100ML
2 INJECTION, SOLUTION INTRAVENOUS
Status: COMPLETED | OUTPATIENT
Start: 2017-10-23 | End: 2017-10-23

## 2017-10-23 RX ORDER — HYDRALAZINE HYDROCHLORIDE 20 MG/ML
2.5-5 INJECTION INTRAMUSCULAR; INTRAVENOUS EVERY 10 MIN PRN
Status: DISCONTINUED | OUTPATIENT
Start: 2017-10-23 | End: 2017-10-23 | Stop reason: HOSPADM

## 2017-10-23 RX ORDER — PROCHLORPERAZINE MALEATE 5 MG
5-10 TABLET ORAL EVERY 6 HOURS PRN
Status: DISCONTINUED | OUTPATIENT
Start: 2017-10-23 | End: 2017-10-24 | Stop reason: HOSPADM

## 2017-10-23 RX ORDER — LIDOCAINE 40 MG/G
CREAM TOPICAL
Status: DISCONTINUED | OUTPATIENT
Start: 2017-10-23 | End: 2017-10-23 | Stop reason: HOSPADM

## 2017-10-23 RX ORDER — FENTANYL CITRATE 50 UG/ML
25-50 INJECTION, SOLUTION INTRAMUSCULAR; INTRAVENOUS
Status: DISCONTINUED | OUTPATIENT
Start: 2017-10-23 | End: 2017-10-23 | Stop reason: HOSPADM

## 2017-10-23 RX ORDER — ONDANSETRON 2 MG/ML
4 INJECTION INTRAMUSCULAR; INTRAVENOUS EVERY 6 HOURS PRN
Status: DISCONTINUED | OUTPATIENT
Start: 2017-10-23 | End: 2017-10-24 | Stop reason: HOSPADM

## 2017-10-23 RX ORDER — AMOXICILLIN 250 MG
1 CAPSULE ORAL 2 TIMES DAILY PRN
Qty: 60 TABLET | Refills: 1 | Status: SHIPPED | OUTPATIENT
Start: 2017-10-23 | End: 2018-03-03

## 2017-10-23 RX ORDER — ONDANSETRON 2 MG/ML
INJECTION INTRAMUSCULAR; INTRAVENOUS PRN
Status: DISCONTINUED | OUTPATIENT
Start: 2017-10-23 | End: 2017-10-23

## 2017-10-23 RX ORDER — DEXAMETHASONE SODIUM PHOSPHATE 4 MG/ML
4 INJECTION, SOLUTION INTRA-ARTICULAR; INTRALESIONAL; INTRAMUSCULAR; INTRAVENOUS; SOFT TISSUE EVERY 10 MIN PRN
Status: DISCONTINUED | OUTPATIENT
Start: 2017-10-23 | End: 2017-10-23 | Stop reason: HOSPADM

## 2017-10-23 RX ORDER — PROPOFOL 10 MG/ML
INJECTION, EMULSION INTRAVENOUS PRN
Status: DISCONTINUED | OUTPATIENT
Start: 2017-10-23 | End: 2017-10-23

## 2017-10-23 RX ORDER — MAGNESIUM HYDROXIDE 1200 MG/15ML
LIQUID ORAL PRN
Status: DISCONTINUED | OUTPATIENT
Start: 2017-10-23 | End: 2017-10-23 | Stop reason: HOSPADM

## 2017-10-23 RX ORDER — OXYCODONE HYDROCHLORIDE 5 MG/1
5-10 TABLET ORAL EVERY 4 HOURS PRN
Qty: 20 TABLET | Refills: 0 | Status: SHIPPED | OUTPATIENT
Start: 2017-10-23 | End: 2017-11-14

## 2017-10-23 RX ORDER — METOPROLOL TARTRATE 1 MG/ML
1-2 INJECTION, SOLUTION INTRAVENOUS EVERY 5 MIN PRN
Status: DISCONTINUED | OUTPATIENT
Start: 2017-10-23 | End: 2017-10-23 | Stop reason: HOSPADM

## 2017-10-23 RX ORDER — VENLAFAXINE HYDROCHLORIDE 75 MG/1
75 CAPSULE, EXTENDED RELEASE ORAL
Status: DISCONTINUED | OUTPATIENT
Start: 2017-10-24 | End: 2017-10-23

## 2017-10-23 RX ORDER — OXYCODONE HYDROCHLORIDE 5 MG/1
5-10 TABLET ORAL
Qty: 20 TABLET | Refills: 0 | Status: SHIPPED | OUTPATIENT
Start: 2017-10-23 | End: 2017-10-23

## 2017-10-23 RX ORDER — ONDANSETRON 2 MG/ML
4 INJECTION INTRAMUSCULAR; INTRAVENOUS EVERY 30 MIN PRN
Status: DISCONTINUED | OUTPATIENT
Start: 2017-10-23 | End: 2017-10-23 | Stop reason: HOSPADM

## 2017-10-23 RX ORDER — LIDOCAINE HYDROCHLORIDE 20 MG/ML
INJECTION, SOLUTION INFILTRATION; PERINEURAL PRN
Status: DISCONTINUED | OUTPATIENT
Start: 2017-10-23 | End: 2017-10-23

## 2017-10-23 RX ORDER — DROPERIDOL 2.5 MG/ML
0.62 INJECTION, SOLUTION INTRAMUSCULAR; INTRAVENOUS
Status: DISCONTINUED | OUTPATIENT
Start: 2017-10-23 | End: 2017-10-23 | Stop reason: HOSPADM

## 2017-10-23 RX ORDER — HYDROMORPHONE HYDROCHLORIDE 1 MG/ML
.3-.5 INJECTION, SOLUTION INTRAMUSCULAR; INTRAVENOUS; SUBCUTANEOUS EVERY 10 MIN PRN
Status: DISCONTINUED | OUTPATIENT
Start: 2017-10-23 | End: 2017-10-23 | Stop reason: HOSPADM

## 2017-10-23 RX ORDER — OXYCODONE HYDROCHLORIDE 5 MG/1
5-10 TABLET ORAL EVERY 4 HOURS PRN
Status: DISCONTINUED | OUTPATIENT
Start: 2017-10-23 | End: 2017-10-24 | Stop reason: HOSPADM

## 2017-10-23 RX ORDER — ONDANSETRON 4 MG/1
4 TABLET, ORALLY DISINTEGRATING ORAL EVERY 30 MIN PRN
Status: DISCONTINUED | OUTPATIENT
Start: 2017-10-23 | End: 2017-10-23 | Stop reason: HOSPADM

## 2017-10-23 RX ORDER — LORAZEPAM 1 MG/1
1 TABLET ORAL EVERY 8 HOURS PRN
Status: DISCONTINUED | OUTPATIENT
Start: 2017-10-23 | End: 2017-10-24 | Stop reason: HOSPADM

## 2017-10-23 RX ORDER — HYDROMORPHONE HCL/0.9% NACL/PF 0.2MG/0.2
0.2 SYRINGE (ML) INTRAVENOUS
Status: DISCONTINUED | OUTPATIENT
Start: 2017-10-23 | End: 2017-10-24

## 2017-10-23 RX ORDER — VENLAFAXINE HYDROCHLORIDE 75 MG/1
75 CAPSULE, EXTENDED RELEASE ORAL
Status: DISCONTINUED | OUTPATIENT
Start: 2017-10-23 | End: 2017-10-24 | Stop reason: HOSPADM

## 2017-10-23 RX ORDER — ONDANSETRON 4 MG/1
4 TABLET, ORALLY DISINTEGRATING ORAL EVERY 6 HOURS PRN
Status: DISCONTINUED | OUTPATIENT
Start: 2017-10-23 | End: 2017-10-24 | Stop reason: HOSPADM

## 2017-10-23 RX ORDER — LABETALOL HYDROCHLORIDE 5 MG/ML
5 INJECTION, SOLUTION INTRAVENOUS EVERY 10 MIN PRN
Status: DISCONTINUED | OUTPATIENT
Start: 2017-10-23 | End: 2017-10-23 | Stop reason: HOSPADM

## 2017-10-23 RX ORDER — NALOXONE HYDROCHLORIDE 0.4 MG/ML
.1-.4 INJECTION, SOLUTION INTRAMUSCULAR; INTRAVENOUS; SUBCUTANEOUS
Status: DISCONTINUED | OUTPATIENT
Start: 2017-10-23 | End: 2017-10-24 | Stop reason: HOSPADM

## 2017-10-23 RX ORDER — FENTANYL CITRATE 50 UG/ML
INJECTION, SOLUTION INTRAMUSCULAR; INTRAVENOUS PRN
Status: DISCONTINUED | OUTPATIENT
Start: 2017-10-23 | End: 2017-10-23

## 2017-10-23 RX ORDER — CEFAZOLIN SODIUM 1 G/3ML
1 INJECTION, POWDER, FOR SOLUTION INTRAMUSCULAR; INTRAVENOUS SEE ADMIN INSTRUCTIONS
Status: DISCONTINUED | OUTPATIENT
Start: 2017-10-23 | End: 2017-10-23 | Stop reason: HOSPADM

## 2017-10-23 RX ORDER — ACETAMINOPHEN 500 MG
1000 TABLET ORAL EVERY 8 HOURS
Status: DISCONTINUED | OUTPATIENT
Start: 2017-10-23 | End: 2017-10-24 | Stop reason: HOSPADM

## 2017-10-23 RX ORDER — PHYSOSTIGMINE SALICYLATE 1 MG/ML
1.2 INJECTION INTRAVENOUS
Status: DISCONTINUED | OUTPATIENT
Start: 2017-10-23 | End: 2017-10-23 | Stop reason: HOSPADM

## 2017-10-23 RX ORDER — NALOXONE HYDROCHLORIDE 0.4 MG/ML
.1-.4 INJECTION, SOLUTION INTRAMUSCULAR; INTRAVENOUS; SUBCUTANEOUS
Status: DISCONTINUED | OUTPATIENT
Start: 2017-10-23 | End: 2017-10-23 | Stop reason: HOSPADM

## 2017-10-23 RX ORDER — ACETAMINOPHEN 325 MG/1
650 TABLET ORAL
Status: DISCONTINUED | OUTPATIENT
Start: 2017-10-23 | End: 2017-10-24 | Stop reason: HOSPADM

## 2017-10-23 RX ORDER — DEXAMETHASONE SODIUM PHOSPHATE 4 MG/ML
INJECTION, SOLUTION INTRA-ARTICULAR; INTRALESIONAL; INTRAMUSCULAR; INTRAVENOUS; SOFT TISSUE PRN
Status: DISCONTINUED | OUTPATIENT
Start: 2017-10-23 | End: 2017-10-23

## 2017-10-23 RX ORDER — ACETAMINOPHEN 325 MG/1
650 TABLET ORAL EVERY 4 HOURS PRN
Qty: 100 TABLET | Refills: 0 | Status: SHIPPED | OUTPATIENT
Start: 2017-10-23 | End: 2018-03-03

## 2017-10-23 RX ADMIN — HYDROMORPHONE HYDROCHLORIDE 0.2 MG: 1 INJECTION, SOLUTION INTRAMUSCULAR; INTRAVENOUS; SUBCUTANEOUS at 13:43

## 2017-10-23 RX ADMIN — Medication 0.1 MG: at 11:18

## 2017-10-23 RX ADMIN — SODIUM CHLORIDE, POTASSIUM CHLORIDE, SODIUM LACTATE AND CALCIUM CHLORIDE: 600; 310; 30; 20 INJECTION, SOLUTION INTRAVENOUS at 09:31

## 2017-10-23 RX ADMIN — HYDROMORPHONE HYDROCHLORIDE 0.3 MG: 1 INJECTION, SOLUTION INTRAMUSCULAR; INTRAVENOUS; SUBCUTANEOUS at 13:31

## 2017-10-23 RX ADMIN — MIDAZOLAM HYDROCHLORIDE 2 MG: 1 INJECTION, SOLUTION INTRAMUSCULAR; INTRAVENOUS at 10:06

## 2017-10-23 RX ADMIN — HYDROMORPHONE HYDROCHLORIDE 0.5 MG: 1 INJECTION, SOLUTION INTRAMUSCULAR; INTRAVENOUS; SUBCUTANEOUS at 14:02

## 2017-10-23 RX ADMIN — ROCURONIUM BROMIDE 50 MG: 10 INJECTION INTRAVENOUS at 10:30

## 2017-10-23 RX ADMIN — FLUOXETINE 20 MG: 20 CAPSULE ORAL at 16:40

## 2017-10-23 RX ADMIN — Medication 0.2 MG: at 20:46

## 2017-10-23 RX ADMIN — LIDOCAINE HYDROCHLORIDE 100 MG: 20 INJECTION, SOLUTION INFILTRATION; PERINEURAL at 10:22

## 2017-10-23 RX ADMIN — ACETAMINOPHEN 1000 MG: 500 TABLET, FILM COATED ORAL at 16:48

## 2017-10-23 RX ADMIN — SODIUM CHLORIDE, POTASSIUM CHLORIDE, SODIUM LACTATE AND CALCIUM CHLORIDE: 600; 310; 30; 20 INJECTION, SOLUTION INTRAVENOUS at 10:06

## 2017-10-23 RX ADMIN — CEFAZOLIN SODIUM 2 G: 2 INJECTION, SOLUTION INTRAVENOUS at 10:45

## 2017-10-23 RX ADMIN — FENTANYL CITRATE 100 MCG: 50 INJECTION, SOLUTION INTRAMUSCULAR; INTRAVENOUS at 10:15

## 2017-10-23 RX ADMIN — ONDANSETRON 4 MG: 2 INJECTION INTRAMUSCULAR; INTRAVENOUS at 12:45

## 2017-10-23 RX ADMIN — FENTANYL CITRATE 50 MCG: 50 INJECTION, SOLUTION INTRAMUSCULAR; INTRAVENOUS at 13:54

## 2017-10-23 RX ADMIN — OXYCODONE HYDROCHLORIDE 5 MG: 5 TABLET ORAL at 21:03

## 2017-10-23 RX ADMIN — ROCURONIUM BROMIDE 20 MG: 10 INJECTION INTRAVENOUS at 10:52

## 2017-10-23 RX ADMIN — SODIUM CHLORIDE 100 ML: 900 IRRIGANT IRRIGATION at 12:33

## 2017-10-23 RX ADMIN — DEXAMETHASONE SODIUM PHOSPHATE 8 MG: 4 INJECTION, SOLUTION INTRA-ARTICULAR; INTRALESIONAL; INTRAMUSCULAR; INTRAVENOUS; SOFT TISSUE at 10:48

## 2017-10-23 RX ADMIN — PROPOFOL 200 MG: 10 INJECTION, EMULSION INTRAVENOUS at 10:29

## 2017-10-23 RX ADMIN — BUPIVACAINE HYDROCHLORIDE 10 ML: 2.5 INJECTION, SOLUTION INFILTRATION; PERINEURAL at 10:56

## 2017-10-23 RX ADMIN — CEFAZOLIN SODIUM 1 G: 2 INJECTION, SOLUTION INTRAVENOUS at 12:45

## 2017-10-23 RX ADMIN — OXYCODONE HYDROCHLORIDE 5 MG: 5 TABLET ORAL at 16:05

## 2017-10-23 RX ADMIN — SUGAMMADEX 200 MG: 100 INJECTION, SOLUTION INTRAVENOUS at 12:55

## 2017-10-23 RX ADMIN — HYDROMORPHONE HYDROCHLORIDE 1 MG: 1 INJECTION, SOLUTION INTRAMUSCULAR; INTRAVENOUS; SUBCUTANEOUS at 11:06

## 2017-10-23 RX ADMIN — MIDAZOLAM 0.5 MG: 1 INJECTION INTRAMUSCULAR; INTRAVENOUS at 09:30

## 2017-10-23 ASSESSMENT — PAIN DESCRIPTION - DESCRIPTORS
DESCRIPTORS: ACHING

## 2017-10-23 ASSESSMENT — ENCOUNTER SYMPTOMS: ORTHOPNEA: 0

## 2017-10-23 NOTE — OP NOTE
DATE OF SERVICE:  10/23/2017      PREOPERATIVE DIAGNOSIS:  Metastatic melanoma.      POSTOPERATIVE DIAGNOSIS:  Metastatic melanoma.      PROCEDURE:  Radical left axillary lymph node dissection.      ATTENDING SURGEON:  Laurent Cool MD      RESIDENT SURGEON:  Srinivasa Klein MD      ANESTHESIA:  General with endotracheal tube intubation.      INDICATIONS FOR SURGERY:  Ms. Doretha Fernandez is a 42-year-old woman who was noted to have left axillary lymphadenopathy and needle biopsy demonstrated metastatic melanoma.  She had no obvious primary tumor.  She had a PET CT scan, which demonstrated disease only in the axilla.  She now presents for surgical treatment.      PROCEDURE IN DETAIL:  After informed consent, the patient was brought to the operating room, given general anesthetic and orotracheally intubated.  She was prepped and draped in the usual fashion.  I injected local anesthetic to the left axilla.  A transverse axillary incision was made with a scalpel.  The Bovie cautery was used to incise subcutaneous tissues.  We raised flaps in all directions.  We then dissected the axillary contents off the lateral border of the pectoralis major muscle.  We then identified the latissimus dorsi muscle and dissected the axillary contents off that muscle.  We then identified the axillary vein and dissected the axillary contents there.  Hemostasis was obtained with surgical clips and with silk ties.  Next, we retracted the specimen medially and identified the thoracodorsal neurovascular bundle and dissected free from the axillary contents.  The venous branch to the serratus was identified and preserved.  We identified the long thoracic nerve.  We removed the axillary contents off the floor of the axilla.  We dissected the long thoracic nerve throughout its course.  Next, we removed the axillary contents from the pectoralis major and minor muscles to include the inner pectoral lymph nodes.  We then dissected the specimen off the  anterior border of the serratus anterior muscle.  Finally, a retractor was placed behind the pectoralis minor muscle and level 1, 2 and 3 lymph node dissections were completed.  There were least 2 enlarged lymph nodes in the specimen.  The specimens were removed and sent to pathology.  Strict hemostasis was obtained with the Bovie cautery.  A 15 round Augustus-Le drain was placed into the anterior axillary line and secured to the skin with a 3-0 nylon suture.  The dermis was closed with interrupted 3-0 Vicryl suture.  The skin was closed with running 4-0 PDS subcuticular stitch.  Dermabond was placed.  The patient tolerated the procedure well and was taken to recovery room.         ALEX GOINS MD             D: 10/23/2017 12:35   T: 10/23/2017 13:48   MT: GUERO      Name:     ELIZABETH MOREIRA   MRN:      [id number]        Account:        [id number]   :      1975           Procedure Date: 10/23/2017      Document: [id number]       cc: Lowell Bullock MD

## 2017-10-23 NOTE — ANESTHESIA PREPROCEDURE EVALUATION
Anesthesia Evaluation     . Pt has had prior anesthetic. Type: General, MAC and Regional    No history of anesthetic complications          ROS/MED HX    ENT/Pulmonary:     (+)STORMY risk factors hypertension, obese, daytime somnolence, Intermittent asthma , . .    Neurologic:     (+)migraines, CVA TIA     Cardiovascular:     (+) hypertension-range: No Rx. Elevated diastolic pressures to >100 on several occasions recently, ---. Taking blood thinners : . . . :. .      (-) ADKINS, orthopnea/PND, irregular heartbeat/palpitations and valvular problems/murmurs   METS/Exercise Tolerance:  >4 METS   Hematologic:     (+) History of blood clots pt is not anticoagulated, Anemia, -      Musculoskeletal:  - neg musculoskeletal ROS       GI/Hepatic:     (+) GERD Asymptomatic on medication,       Renal/Genitourinary:  - ROS Renal section negative       Endo: Comment: Body mass index is 36.9 kg/(m^2).    (+) Obesity, .      Psychiatric:     (+) psychiatric history anxiety and depression      Infectious Disease:  - neg infectious disease ROS       Malignancy:   (+) Malignancy History of Skin  Skin CA Active status post Surgery,         Other:                     Physical Exam  Normal systems: pulmonary and dental    Airway   Mallampati: III  TM distance: <3 FB  Neck ROM: full    Dental     Cardiovascular   Rhythm and rate: regular and normal  (-) no peripheral edema and no murmur    Pulmonary    breath sounds clear to auscultation(-) no wheezes and no rales                    Anesthesia Plan      History & Physical Review      ASA Status:  3 .    NPO Status:  > 8 hours    Plan for General and ETT with Intravenous induction. Maintenance will be Balanced.    PONV prophylaxis:  Ondansetron (or other 5HT-3), Dexamethasone or Solumedrol and Other (See comment) (propofol)  Additional equipment: 2nd IV and Videolaryngoscope      Postoperative Care  Postoperative pain management:  IV analgesics, Oral pain medications and Multi-modal analgesia.       Consents  Anesthetic plan, risks, benefits and alternatives discussed with:  Patient and Daughter/Son..                          .

## 2017-10-23 NOTE — OR NURSING
Pt was seen by Dr. Dorsey,Dr. Cool and resident. Pre op orders now in epic. Pt expressed that she is very nervous. Dr. Dorsey gave order  to give 0.5 mg of Versed after surgery consent is signed. Dr. Cool and was at the bedside,explained procedure to pt and family. Versed was then given after consent. Pt is now relaxed and conversing well with family.

## 2017-10-23 NOTE — OR NURSING
Dr. Cool at bedside in PACU, pt verbalized she continues to be in a lot of pain, appears anxious and is tearful. Resting between pain medication administration.  Dr. Cool discussed procedure with pt.    Continue to treat pain and monitor pt.

## 2017-10-23 NOTE — IP AVS SNAPSHOT
Unit 6D Observation [address] [address]    Phone:  [phone]    Fax:  [phone]                                       After Visit Summary   10/23/2017    Doretha Fernandez    MRN: [id number]           After Visit Summary Signature Page     I have received my discharge instructions, and my questions have been answered. I have discussed any challenges I see with this plan with the nurse or doctor.    ..........................................................................................................................................  Patient/Patient Representative Signature      ..........................................................................................................................................  Patient Representative Print Name and Relationship to Patient    ..................................................               ................................................  Date                                            Time    ..........................................................................................................................................  Reviewed by Signature/Title    ...................................................              ..............................................  Date                                                            Time

## 2017-10-23 NOTE — PROVIDER NOTIFICATION
Text paged SurgOnc Re:  Pt states, she takes Prozac with Effexor  together can you please ordered that.

## 2017-10-23 NOTE — ANESTHESIA POSTPROCEDURE EVALUATION
Patient: Doretha Fernandez    Procedure(s):  Left Axillary Lymph Node Dissection  - Wound Class: I-Clean    Diagnosis:Melanoma   Diagnosis Additional Information: No value filed.    Anesthesia Type:  General, ETT    Note:  Anesthesia Post Evaluation    Patient location during evaluation: PACU  Patient participation: Able to fully participate in evaluation  Level of consciousness: awake  Pain management: adequate  Airway patency: patent  Cardiovascular status: acceptable  Respiratory status: acceptable  Hydration status: acceptable  PONV: none     Anesthetic complications: None    Comments: She has quite a bit of pain as well as anxiety.  The patient will be admitted for pain management.        Last vitals:  Vitals:    10/23/17 1400 10/23/17 1415 10/23/17 1430   BP: 124/75 (!) 146/33 115/79   Resp: 16 16 16   Temp:      SpO2: 98% 97% 98%         Electronically Signed By: Anthony Mahan MD  October 23, 2017  2:43 PM

## 2017-10-23 NOTE — BRIEF OP NOTE
[facility], [place]    Brief Operative Note    Pre-operative diagnosis: Melanoma metastatic to left axilla  Post-operative diagnosis Left axillary lymph node dissection  Procedure: Procedure(s):  Left Axillary Lymph Node Dissection  - Wound Class: I-Clean  Surgeon: Surgeon(s) and Role:     * Laurent Cool MD - Primary     * Annamarie Klein MD PGY-6 - Assisting  Anesthesia: General   Estimated blood loss: 5 ml  Drains: Augustus-El  Specimens:   ID Type Source Tests Collected by Time Destination   A : Left Axillary Lymph Node Dissection Tissue Axilla, Left SURGICAL PATHOLOGY EXAM Laurent Cool MD 10/23/2017 12:20 PM      Findings: Large, palpable lymph node removed with specimen  Complications: None.  Implants: None.

## 2017-10-23 NOTE — ANESTHESIA CARE TRANSFER NOTE
Patient: Doretha Fernandez    Procedure(s):  Left Axillary Lymph Node Dissection  - Wound Class: I-Clean    Diagnosis: Melanoma   Diagnosis Additional Information: No value filed.    Anesthesia Type:   General, ETT     Note:  Airway :Face Mask  Patient transferred to:PACU  Comments: Pt remains stable, monitors on alarms in place, report to PACU RN, no complicationsHandoff Report: Identifed the Patient, Identified the Reponsible Provider, Reviewed the pertinent medical history, Discussed the surgical course, Reviewed Intra-OP anesthesia mangement and issues during anesthesia, Set expectations for post-procedure period and Allowed opportunity for questions and acknowledgement of understanding      Vitals: (Last set prior to Anesthesia Care Transfer)    CRNA VITALS  10/23/2017 1241 - 10/23/2017 1320      10/23/2017             EKG: Sinus bradycardia                Electronically Signed By: AMADA Gatica CRNA  October 23, 2017  1:20 PM

## 2017-10-23 NOTE — IP AVS SNAPSHOT
MRN:[id number]                      After Visit Summary   10/23/2017    Doretha Fernandez    MRN: [id number]           Thank you!     Thank you for choosing [facility] for your care. Our goal is always to provide you with excellent care. Hearing back from our patients is one way we can continue to improve our services. Please take a few minutes to complete the written survey that you may receive in the mail after you visit with us. Thank you!        Patient Information     Date Of Birth          1975        About your hospital stay     You were admitted on:  October 23, 2017 You last received care in the:  Unit 6D Observation [facility]    You were discharged on:  October 24, 2017       Who to Call     For medical emergencies, please call [phone].  For non-urgent questions about your medical care, please call your primary care provider or clinic, [phone]  For questions related to your surgery, please call your surgery clinic        Attending Provider     Provider Laurent Mchugh MD General Surgery       Primary Care Provider Office Phone # Fax #    Anna Naidu [phone]      After Care Instructions     Diet Instructions       Resume pre-procedure diet            Discharge Instructions       Follow up appointment as instructed by Surgeon and or RN.    May start regular diet immediately.    No lifting greater than 10 lb for 6 weeks.     May shower now. No bathing for two weeks.    Call [phone] to re-schedule appointments or with routine questions during the work week.      Call [phone] and ask to speak with surgery resident if you are having troubles in the evenings, at night, or on weekends. Please call if you experience increasing abdominal pain, nausea, vomiting, increasing drainage from your wounds, chills, or fever >101.5     Take stool softener while taking narcotic pain medication to prevent constipation.    No driving for at least 12 hours  after taking narcotic pain medication.    You are going home with the following tubes or drains: SHAY drain.  Strip tubing, empty, and record output daily.            No lifting        No lifting over 10 lbs with your left arm and no strenuous physical activity for 6 weeks            Shower       No shower for 24 hours post procedure. May shower starting 10/24/17.                  Your next 10 appointments already scheduled     Nov 02, 2017 10:30 AM CDT   Return Visit with Kathy Richards MD   [facility] ([facility])    [facility]  [address]  [facility] [address]   [phone]            Nov 15, 2017  2:00 PM CST   (Arrive by 1:45 PM)   Return Visit with Bairon Miranda MD   [facility] ([facility] [place])    [address] [place] [id number] [phone]            Jan 09, 2018 10:00 AM CST   Return Visit with Lowell Bullock MD   [facility]    [address] [address]   [phone]              Additional Information     If you use hormonal birth control (such as the pill, patch, ring or implants): You'll need a second form of birth control for 7 days (condoms, a diaphragm or contraceptive foam). While in the hospital, you received a medicine called Bridion. Your normal birth control will not work as well for a week after taking this medicine.          Pending Results     Date and Time Order Name Status Description    10/23/2017 1222 Surgical pathology exam In process             Statement of Approval     Ordered          10/24/17 0918  I have reviewed and agree with all the recommendations and orders detailed in this document.  EFFECTIVE NOW     Approved and electronically signed by:  Jessica Castellano PA-C             Admission Information     Date & Time Provider Department Dept. Phone    10/23/2017 Laurent Cool MD  "Unit 6D Observation [facility] [place] [phone]      Your Vitals Were     Blood Pressure Pulse Temperature Respirations Height Weight    90/49 (BP Location: Right arm) 71 98.4  F (36.9  C) (Oral) 16 1.6 m (5' 3\") 94.5 kg (208 lb 5.4 oz)    Last Period Pulse Oximetry BMI (Body Mass Index)             10/06/2017 97% 36.9 kg/m2         [employer] Information     [facility] gives you secure access to your electronic health record. If you see a primary care provider, you can also send messages to your care team and make appointments. If you have questions, please call your primary care clinic.  If you do not have a primary care provider, please call [phone] and they will assist you.        Care EveryWhere ID     This is your Care EveryWhere ID. This could be used by other organizations to access your [facility] medical records  [id number]        Equal Access to Services     BRIGETTE QUARLES : Nicol Gustafson, melba valencia, paulina escobedoalmabessy perez, pancho castillo. So [facility] [phone].    ATENCIÓN: Si habla español, tiene a mcdonnell disposición servicios gratuitos de asistencia lingüística. Diana al [phone].    We comply with applicable federal civil rights laws and [place] laws. We do not discriminate on the basis of race, color, national origin, age, disability, sex, sexual orientation, or gender identity.               Review of your medicines      START taking        Dose / Directions    * acetaminophen 325 MG tablet   Commonly known as:  TYLENOL   Used for:  Malignant melanoma of left upper extremity including shoulder (H)        Dose:  650 mg   Take 2 tablets (650 mg) by mouth every 4 hours as needed for other (mild pain)   Quantity:  100 tablet   Refills:  0       * acetaminophen 325 MG tablet   Commonly known as:  TYLENOL   Used for:  Postoperative pain        Dose:  650 mg   Take 2 tablets (650 mg) by mouth once as needed for mild pain (to moderate pain)   Quantity:  50 " tablet   Refills:  0       oxyCODONE 5 MG IR tablet   Commonly known as:  ROXICODONE   Used for:  Malignant melanoma of left upper extremity including shoulder (H)        Dose:  5-10 mg   Take 1-2 tablets (5-10 mg) by mouth every 4 hours as needed for moderate to severe pain .  Space out doses as able to wean yourself off of narcotic medication as soon as possible.   Quantity:  20 tablet   Refills:  0       senna-docusate 8.6-50 MG per tablet   Commonly known as:  SENOKOT-S;PERICOLACE   Used for:  Malignant melanoma of left upper extremity including shoulder (H)        Dose:  1 tablet   Take 1 tablet by mouth 2 times daily as needed for constipation   Quantity:  60 tablet   Refills:  1       * Notice:  This list has 2 medication(s) that are the same as other medications prescribed for you. Read the directions carefully, and ask your doctor or other care provider to review them with you.      CONTINUE these medicines which have NOT CHANGED        Dose / Directions    FLUoxetine 20 MG capsule   Commonly known as:  PROzac        Take by mouth daily   Refills:  0       LORazepam 1 MG tablet   Commonly known as:  ATIVAN   Used for:  Anxiety, Acute intractable tension-type headache        Dose:  1 mg   Take 1 tablet (1 mg) by mouth every 8 hours as needed for anxiety   Quantity:  30 tablet   Refills:  0       venlafaxine 37.5 MG 24 hr capsule   Commonly known as:  EFFEXOR-XR   Used for:  Major depressive disorder, recurrent episode, moderate (H), SANDRA (generalized anxiety disorder)        Take 1 capsule daily for 14 days, then take 2 capsules daily.   Quantity:  46 capsule   Refills:  1       Vitamin D3 3000 UNITS Tabs   Used for:  Vitamin D deficiency        Dose:  3000 Int'l Units   Take 3,000 Int'l Units by mouth daily   Quantity:  30 tablet   Refills:  3       zonisamide 25 MG capsule   Commonly known as:  Zonegran   Used for:  Headache disorder        Take 1 tablet (25 mg) once daily for 1 week, then 2 tablets once  daily for 1 week, then 3 tablets once daily for 1 week, then 4 tablets once daily for 1 week.   Quantity:  90 capsule   Refills:  1            Where to get your medicines      These medications were sent to [facility] [place], [place] - [address]  [address] [address]     Phone:  [phone]     acetaminophen 325 MG tablet    acetaminophen 325 MG tablet    senna-docusate 8.6-50 MG per tablet         Some of these will need a paper prescription and others can be bought over the counter. Ask your nurse if you have questions.     Bring a paper prescription for each of these medications     oxyCODONE 5 MG IR tablet                Protect others around you: Learn how to safely use, store and throw away your medicines at www.disposemymeds.org.             Medication List: This is a list of all your medications and when to take them. Check marks below indicate your daily home schedule. Keep this list as a reference.      Medications           Morning Afternoon Evening Bedtime As Needed    * acetaminophen 325 MG tablet   Commonly known as:  TYLENOL   Take 2 tablets (650 mg) by mouth every 4 hours as needed for other (mild pain)   Last time this was given:  1,000 mg on 10/24/2017  7:52 AM                                * acetaminophen 325 MG tablet   Commonly known as:  TYLENOL   Take 2 tablets (650 mg) by mouth once as needed for mild pain (to moderate pain)   Last time this was given:  1,000 mg on 10/24/2017  7:52 AM                                FLUoxetine 20 MG capsule   Commonly known as:  PROzac   Take by mouth daily   Last time this was given:  20 mg on 10/24/2017  7:51 AM                                LORazepam 1 MG tablet   Commonly known as:  ATIVAN   Take 1 tablet (1 mg) by mouth every 8 hours as needed for anxiety                                oxyCODONE 5 MG IR tablet   Commonly known as:  ROXICODONE   Take 1-2 tablets (5-10 mg) by mouth every 4 hours as  needed for moderate to severe pain .  Space out doses as able to wean yourself off of narcotic medication as soon as possible.   Last time this was given:  10 mg on 10/24/2017  6:24 AM                                senna-docusate 8.6-50 MG per tablet   Commonly known as:  SENOKOT-S;PERICOLACE   Take 1 tablet by mouth 2 times daily as needed for constipation                                venlafaxine 37.5 MG 24 hr capsule   Commonly known as:  EFFEXOR-XR   Take 1 capsule daily for 14 days, then take 2 capsules daily.   Last time this was given:  75 mg on 10/24/2017  7:51 AM                                Vitamin D3 3000 UNITS Tabs   Take 3,000 Int'l Units by mouth daily   Last time this was given:  3,000 Units on 10/24/2017  7:52 AM                                zonisamide 25 MG capsule   Commonly known as:  Zonegran   Take 1 tablet (25 mg) once daily for 1 week, then 2 tablets once daily for 1 week, then 3 tablets once daily for 1 week, then 4 tablets once daily for 1 week.                                * Notice:  This list has 2 medication(s) that are the same as other medications prescribed for you. Read the directions carefully, and ask your doctor or other care provider to review them with you.

## 2017-10-24 VITALS
RESPIRATION RATE: 16 BRPM | WEIGHT: 208.34 LBS | SYSTOLIC BLOOD PRESSURE: 90 MMHG | DIASTOLIC BLOOD PRESSURE: 49 MMHG | HEIGHT: 63 IN | HEART RATE: 71 BPM | OXYGEN SATURATION: 97 % | TEMPERATURE: 98.4 F | BODY MASS INDEX: 36.91 KG/M2

## 2017-10-24 LAB — GLUCOSE BLDC GLUCOMTR-MCNC: 107 MG/DL (ref 70–99)

## 2017-10-24 PROCEDURE — 25000128 H RX IP 250 OP 636: Performed by: SURGERY

## 2017-10-24 PROCEDURE — 00000146 ZZHCL STATISTIC GLUCOSE BY METER IP

## 2017-10-24 PROCEDURE — 25000132 ZZH RX MED GY IP 250 OP 250 PS 637: Performed by: STUDENT IN AN ORGANIZED HEALTH CARE EDUCATION/TRAINING PROGRAM

## 2017-10-24 PROCEDURE — 25000132 ZZH RX MED GY IP 250 OP 250 PS 637: Performed by: SURGERY

## 2017-10-24 PROCEDURE — G0378 HOSPITAL OBSERVATION PER HR: HCPCS

## 2017-10-24 RX ORDER — CALCIUM CARBONATE 500 MG/1
500-1000 TABLET, CHEWABLE ORAL 3 TIMES DAILY PRN
Status: DISCONTINUED | OUTPATIENT
Start: 2017-10-24 | End: 2017-10-24 | Stop reason: HOSPADM

## 2017-10-24 RX ORDER — ACETAMINOPHEN 325 MG/1
650 TABLET ORAL
Qty: 50 TABLET | Refills: 0 | Status: SHIPPED | OUTPATIENT
Start: 2017-10-24 | End: 2017-11-02

## 2017-10-24 RX ADMIN — VITAMIN D, TAB 1000IU (100/BT) 3000 UNITS: 25 TAB at 07:52

## 2017-10-24 RX ADMIN — FLUOXETINE 20 MG: 20 CAPSULE ORAL at 07:51

## 2017-10-24 RX ADMIN — CALCIUM CARBONATE (ANTACID) CHEW TAB 500 MG 500 MG: 500 CHEW TAB at 06:56

## 2017-10-24 RX ADMIN — Medication 0.2 MG: at 02:46

## 2017-10-24 RX ADMIN — ACETAMINOPHEN 1000 MG: 500 TABLET, FILM COATED ORAL at 07:52

## 2017-10-24 RX ADMIN — VENLAFAXINE HYDROCHLORIDE 75 MG: 75 CAPSULE, EXTENDED RELEASE ORAL at 07:51

## 2017-10-24 RX ADMIN — OXYCODONE HYDROCHLORIDE 5 MG: 5 TABLET ORAL at 00:09

## 2017-10-24 RX ADMIN — OXYCODONE HYDROCHLORIDE 10 MG: 5 TABLET ORAL at 14:49

## 2017-10-24 RX ADMIN — OXYCODONE HYDROCHLORIDE 10 MG: 5 TABLET ORAL at 06:24

## 2017-10-24 RX ADMIN — ACETAMINOPHEN 1000 MG: 500 TABLET, FILM COATED ORAL at 00:08

## 2017-10-24 RX ADMIN — LORAZEPAM 1 MG: 1 TABLET ORAL at 15:05

## 2017-10-24 ASSESSMENT — ACTIVITIES OF DAILY LIVING (ADL)
COGNITION: 0 - NO COGNITION ISSUES REPORTED
BATHING: 0-->INDEPENDENT
DRESS: 0-->INDEPENDENT
TOILETING: 0-->INDEPENDENT
RETIRED_COMMUNICATION: 0-->UNDERSTANDS/COMMUNICATES WITHOUT DIFFICULTY
AMBULATION: 0-->INDEPENDENT
SWALLOWING: 0-->SWALLOWS FOODS/LIQUIDS WITHOUT DIFFICULTY
RETIRED_EATING: 0-->INDEPENDENT
TRANSFERRING: 0-->INDEPENDENT
FALL_HISTORY_WITHIN_LAST_SIX_MONTHS: NO

## 2017-10-24 ASSESSMENT — PAIN DESCRIPTION - DESCRIPTORS: DESCRIPTORS: ACHING

## 2017-10-24 NOTE — PROGRESS NOTES
Discharge instruction reviewed.  Patient verbalized understanding, SHAY drain teaching completed and pt understands. PIV removed, patient transported to the mail lobby via w/c. Family awaiting at main lobby. Patient discharged

## 2017-10-24 NOTE — PLAN OF CARE
"Problem: Patient Care Overview  Goal: Plan of Care/Patient Progress Review  Adequate pain control using oral analgesics. NO, pt taking prn po oxycodone, reports very minimal relief  -Tolerates oral intake. Yes  -Cleared for discharge per provider. No.  Patient alert and oriented x 4. Having pain in the left arm surgical area.  Has a SHAY drain. Patient ambulated to the bathroom with aqssistance and voided.  /60 (BP Location: Right arm)  Pulse 71  Temp 98.4  F (36.9  C) (Oral)  Resp 16  Ht 1.6 m (5' 3\")  Wt 94.5 kg (208 lb 5.4 oz)  LMP 10/06/2017  SpO2 94%  Breastfeeding? No  BMI 36.9 kg/m2                    "

## 2017-10-24 NOTE — PLAN OF CARE
Problem: Patient Care Overview  Goal: Discharge Needs Assessment  Adequate pain control using oral analgesics. No. Patient had oxycodone and iv dilaudid for left arm pain  -Tolerates oral intake. Yes  -Cleared for discharge per provider. No.  Patient alert and oriented x 4. Having pain in the left arm surgical area. Given oxycodone and iv dilaudid. Has a SHAY drain. Patient ambulated to the bathroom with aqssistance and voided.

## 2017-10-24 NOTE — PLAN OF CARE
Problem: Patient Care Overview  Goal: Discharge Needs Assessment  Outpatient/Observation goals to be met before discharge home:     List all goals to be met before discharge home:   -Adequate pain control using oral analgesics - No  -Tolerates oral intake - Yes  -Cleared for discharge per provider - No

## 2017-10-24 NOTE — PLAN OF CARE
Problem: Patient Care Overview  Goal: Individualization & Mutuality  Adequate pain control using oral analgesics. No. Patient had oxycodone and iv dilaudid for left arm pain  -Tolerates oral intake. Yes  -Cleared for discharge per provider. No.  Patient alert and oriented x 4. Having pain in the left arm surgical area. Given oxycodone and iv dilaudid. Has a SHAY drain. Patient ambulated to the bathroom with aqssistance and voided.

## 2017-10-24 NOTE — PLAN OF CARE
Problem: Patient Care Overview  Goal: Plan of Care/Patient Progress Review  Adequate pain control using oral analgesics. Yes, pt taking prn po oxycodone  -Tolerates oral intake. Yes  -Cleared for discharge per provider. yes  Patient alert and oriented x 4. Having pain in the left arm surgical area.  Has a SHAY drain. Patient ambulated to the bathroom with aqssistance and voided.

## 2017-10-24 NOTE — DISCHARGE SUMMARY
[facility] Discharge Summary    Doretha Fernandez MRN# [id number]   Age: 42 year old YOB: 1975     Date of Admission:  10/23/2017  Date of Discharge::  10/24/2017  Admitting Physician:  Laurent Cool MD  Discharge Physician:  Laurent Cool MD     PCP:  Anna Naidu    Disposition: Patient discharged to home in stable condition.    Admission Diagnosis:  Metastatic Melanoma   History of TIA  Prothrombin deficiency  Asthma  Depression  Anxiety       Discharge Diagnosis:  Metastatic Melanoma   History of TIA  Prothrombin deficiency  Asthma  Depression  Anxiety     Current Discharge Medication List      START taking these medications    Details   !! acetaminophen (TYLENOL) 325 MG tablet Take 2 tablets (650 mg) by mouth once as needed for mild pain (to moderate pain)  Qty: 50 tablet, Refills: 0    Associated Diagnoses: Postoperative pain      !! acetaminophen (TYLENOL) 325 MG tablet Take 2 tablets (650 mg) by mouth every 4 hours as needed for other (mild pain)  Qty: 100 tablet, Refills: 0    Associated Diagnoses: Malignant melanoma of left upper extremity including shoulder (H)      senna-docusate (SENOKOT-S;PERICOLACE) 8.6-50 MG per tablet Take 1 tablet by mouth 2 times daily as needed for constipation  Qty: 60 tablet, Refills: 1    Associated Diagnoses: Malignant melanoma of left upper extremity including shoulder (H)      oxyCODONE (ROXICODONE) 5 MG IR tablet Take 1-2 tablets (5-10 mg) by mouth every 4 hours as needed for moderate to severe pain .  Space out doses as able to wean yourself off of narcotic medication as soon as possible.  Qty: 20 tablet, Refills: 0    Associated Diagnoses: Malignant melanoma of left upper extremity including shoulder (H)       !! - Potential duplicate medications found. Please discuss with provider.      CONTINUE these medications which have NOT CHANGED    Details   FLUoxetine (PROZAC) 20 MG capsule Take by mouth daily      LORazepam (ATIVAN)  1 MG tablet Take 1 tablet (1 mg) by mouth every 8 hours as needed for anxiety  Qty: 30 tablet, Refills: 0    Associated Diagnoses: Anxiety; Acute intractable tension-type headache      venlafaxine (EFFEXOR-XR) 37.5 MG 24 hr capsule Take 1 capsule daily for 14 days, then take 2 capsules daily.  Qty: 46 capsule, Refills: 1    Associated Diagnoses: Major depressive disorder, recurrent episode, moderate (H); SANDRA (generalized anxiety disorder)      Cholecalciferol (VITAMIN D3) 3000 UNITS TABS Take 3,000 Int'l Units by mouth daily  Qty: 30 tablet, Refills: 3    Associated Diagnoses: Vitamin D deficiency      zonisamide (ZONEGRAN) 25 MG capsule Take 1 tablet (25 mg) once daily for 1 week, then 2 tablets once daily for 1 week, then 3 tablets once daily for 1 week, then 4 tablets once daily for 1 week.  Qty: 90 capsule, Refills: 1    Associated Diagnoses: Headache disorder           Follow up, Special Instructions:  Diet: Regular  Activities: No lifting more than 10 pounds for 6 weeks.   Wound care: Okay to shower  Drain care: Strip drain 3 times daily. Record daily SHAY output.   Follow up: Follow up in 1 week with Dr. Cool.     Procedures:  10/23/17 Radical left axillary lymph node dissection with Dr. Cool.     Brief HPI:  Doretha Fernandez is a 42 year old year old female with left axillary metastatic melanoma with unidentified primary.     Hospital Course:  The patient was admitted and underwent the above procedure. The patient tolerated the procedure well. The patient recovered well with no post-operative complications. Prior to discharge pain was controlled with oral pain medication and the patient was able to ambulate and void without difficulty. The patient received appropriate education post operatively. On POD #1 the patient was discharged to home.    Surgical pathology  Pending     Jessica Castellano PA-C

## 2017-10-24 NOTE — PLAN OF CARE
"Problem: Patient Care Overview  Goal: Discharge Needs Assessment  Adequate pain control using oral analgesics. No. Patient had oxycodone and iv dilaudid for left arm pain  -Tolerates oral intake. Yes  -Cleared for discharge per provider. No.  Patient alert and oriented x 4. Having pain in the left arm surgical area. Given oxycodone and iv dilaudid. Has a SHAY drain. Patient ambulated to the bathroom with aqssistance and voided.  /72 (BP Location: Right leg)  Temp 98.3  F (36.8  C) (Oral)  Resp 16  Ht 1.6 m (5' 3\")  Wt 94.5 kg (208 lb 5.4 oz)  LMP 10/06/2017  SpO2 98%  Breastfeeding? No  BMI 36.9 kg/m2          "

## 2017-10-24 NOTE — PROGRESS NOTES
Surgical Oncology Progress Note    Interval History:  No acute events overnight. Pain controlled. Denies nausea.     Physical Exam:   Temp:  [98.2  F (36.8  C)-98.4  F (36.9  C)] 98.4  F (36.9  C)  Pulse:  [71-78] 71  Heart Rate:  [73-92] 81  Resp:  [14-18] 16  BP: (100-146)/(33-79) 104/60  SpO2:  [93 %-100 %] 94 %  General: Alert, oriented, appears comfortable, NAD.  Respiratory: breathing non labored  Extremity: Left axillary incision is clean and intact with  Mild ecchymosis. SHAY with serosanguinous output.     Data:   All laboratory and imaging data in the past 24 hours reviewed  I/O last 3 completed shifts:  In: 1470 [P.O.:220; I.V.:1250]  Out: 1040 [Urine:900; Drains:140]  Recent Labs   Lab Test  10/04/17   1411  06/05/17   1432  04/04/17   0625  04/01/17   1420   11/19/10   1720   WBC  7.7  8.1  8.6  6.6   < >  8.5   HGB  11.7  12.3  11.8  12.1   < >  13.0   PLT  272  294  249  247   < >  246   INR   --    --    --   0.92   --   0.93    < > = values in this interval not displayed.      Recent Labs   Lab Test  10/04/17   1411  06/05/17   1432  04/04/17   0625   NA  141  139  145*   POTASSIUM  3.9  3.8  3.7   CHLORIDE  107  109  111*   CO2  26  23  25   BUN  12  15  16   CR  0.92  0.71  0.80   ANIONGAP  8  7  9   PATRICIA  9.1  8.9  8.8   GLC  100*  82  80        Recent Labs   Lab Test  10/04/17   1411   PROTTOTAL  7.2   ALBUMIN  3.7   BILITOTAL  0.2   ALKPHOS  62   AST  9   ALT  30       Assessment and Plan:     Doretha Fernandez is a 42 year old female POD 1 s/p left axillary lymph node dissection for metastatic melanoma.     - oxycodone and tylenol for pain  - Regular diet  - SHAY cares and teaching  - Discharge home today    Seen and examined with Dr. Silvina Castellano PAFarhatC   Surgical Oncology

## 2017-10-24 NOTE — PROGRESS NOTES
"Social Work Services Progress Note    Hospital Day: 2    Collaborated with:  Patient    Data:  SW received RN referral to see pt in regards to resources.      Intervention:  SW met with pt and introduced role of SW.  Pt is .  Pt owns a home and has full custody of her daughters, ages 10 and 13.   Pt is employed.  Pt states that she is on a leave of absence from work to address her new diagnosis of cancer.  Pt states that she has used all of her sick and vacation benefits through work.    Pt states that she is not eligible for short term disability benefits through her employer but will be eligible for long term disability benefits in 70 days.  The long term disability benefit is 60% of pt's regular compensation.  Pt does not have stocks, bonds, certificates, WILLAM's or annuity's.   Pt states that she has a total of $1400.00 remaining between her savings and checking accounts.  Pt's mortgage payment is $1080.00 per month.  Pt has no incoming income at this time.  Pt has expenses: house payment, utilities, gas, car insurance, food expenses ect.    Per pt, her support system includes her father (resides in [place]), sister- Clementine (resides in [facility]) and friends.    Pt states that her mother  at the age of 45 from lung cancer (with mets).  Pt states that she is having trouble with adjustment to illness/diagnosis because of the fact that her mother  at age 45 from cancer and now pt has cancer.  Pt states that she saw a therapist in the recent past through her employers E[employer] program.  Pt states that the therapist's name was Grace and Grace worked for \"[employer].\"  Pt voices a desire to resume therapy and she states that she will be contacting her EAP to initiate this.      MIGUEL spoke with Jessica Castellano, Surgical Oncology P.A. Who states that pt will need to be off work for 1-2 weeks plus, to recover from 10/23/17 surgery.  Jessica indicates that pt will discharge with drains in place and thus, is not able " "to work at this time.  Jessica confirms plan to start radiation and immunotherapy in Mid December 2017.      MIGUEL completed an \"[employer] Emergency Financial Assistance\" application, with pt.  Application was faxed to the [facility].      SW completed a [employer] [facility] Major Funding Request and submitted.    SW also advised pt to check eligibility for food and cash assistance with her home county.    Assessment:  Pt voiced gratitude for  interventions.    Plan:    Anticipated Disposition: Return to home    Barriers to d/c plan:  None identified    Follow Up:  SW available should add'l needs arise.    DELIA Blair  Social Work, 6A  Phone:  [phone]  Pager:  [phone]  10/24/2017        "

## 2017-10-25 ENCOUNTER — TELEPHONE (OUTPATIENT)
Dept: FAMILY MEDICINE | Facility: CLINIC | Age: 42
End: 2017-10-25

## 2017-10-25 ENCOUNTER — TELEPHONE (OUTPATIENT)
Dept: SURGERY | Facility: CLINIC | Age: 42
End: 2017-10-25

## 2017-10-25 DIAGNOSIS — G89.18 POST-OP PAIN: Primary | ICD-10-CM

## 2017-10-25 RX ORDER — OXYCODONE HYDROCHLORIDE 5 MG/1
5-10 TABLET ORAL EVERY 4 HOURS PRN
Qty: 60 TABLET | Refills: 0 | Status: SHIPPED | OUTPATIENT
Start: 2017-10-25 | End: 2017-11-02

## 2017-10-25 NOTE — TELEPHONE ENCOUNTER
ED/UC/IP follow up phone call:    RN please call to follow up.    Number of ED visits in past 12 months = 6

## 2017-10-25 NOTE — TELEPHONE ENCOUNTER
POST-OP CALL  Oct 25, 2017    Doretha Fernandez is a 42 year old female s/p axillary lymph node dissection.     Reports pain. Taking 5 mg every 4 hrs for pain. She reduced to 5 mg because she is running out of pain medication. She was only given 20 oxycodone at discharge. This was refilled and she will pick this up. Miralax daily was recommended. We discussed gently arm exercises. She has stable swelling. Her drain is functioning.   Fevers/chills: Patient denies fever/chills.  Eating/drinking: Patient is able to eat and drink without any complaints.   Bowel habits: Patient reports having a normal bowel movement.  Urine output: Voiding without difficulty.   Drains (SHAY): site covered with gauze, denies drainage from around site. Drainage: light pink, Output: being recorded in oz but she does not know the amount. Recommended recording in ml.    Incisions: Patient denies any signs and symptoms of infection. No erythema, or drainage  Pain: Patient reports pain.   Narcotics: Given refill of oxycodone.   Follow up appointment needs to be scheduled.   Patient will call with any questions or concerns.    Jessica Castellano PA-C

## 2017-10-26 ENCOUNTER — CARE COORDINATION (OUTPATIENT)
Dept: CARE COORDINATION | Facility: CLINIC | Age: 42
End: 2017-10-26

## 2017-10-26 NOTE — PROGRESS NOTES
Clinic Care Coordination Contact  [facility]/Voicemail    Referral Source: IP/TCU Report  Clinical Data: Care Coordinator Outreach  Outreach attempted x 1.  Left message on voicemail with call back information and requested return call.  Plan: Care Coordinator will mail out care coordination introduction letter with care coordinator contact information and explanation of care coordination services. Care Coordinator will try to reach patient again in 1-2 business days.  Сергей Mott RN  Clinic Care Coordinator  [phone] or [phone]

## 2017-10-26 NOTE — LETTER
Health Care Home - Access Care Plan    About Me  Patient Name:  Doretha Fernandez    YOB: 1975  Age:                            42 year old   [facility] MRN:         [id number] Telephone Information:     Home Phone [phone]   Mobile [phone]       Address:    [address] [place] [address] Email address:  [email or web address]      Emergency Contact(s)  Name Relationship Lgl Grd Work Phone Home Phone Mobile Phone   1. MEGAN MILLS Father    none   2. JUDY GRANT Step parent  none [phone] none             Health Maintenance:      My Access Plan  Medical Emergency 911   Questions or concerns during clinic hours Primary Clinic Line, I will call the clinic directly: Primary Clinic: [facility]- [phone]   24 Hour Appointment Line [phone] or  [phone])  (toll free)   24 Hour Nurse Line [phone] (toll free)   Questions or concerns outside clinic hours 24 Hour Appointment Line, I will call the after-hours on-call line:   [facility] [phone] or [phone]) (toll-free)   Preferred Urgent Care Preferred Urgent Care: [facility], [phone]   Preferred Hospital Preferred Hospital: [place], [place]  [phone]   Preferred Pharmacy [facility] [place], [place] - [address]     Behavioral Health Crisis Line The National Suicide Prevention Lifeline at 1-941.944.5152 or 277     My Care Team Members  Patient Care Team       Relationship Specialty Notifications Start End    Anna Naidu MD PCP - General Family Practice  7/28/11     Phone: [phone] Pager: [phone] Fax: [phone] [address] [facility] [address]    Bairon Miranda MD MD Neurology  5/17/17     Phone: [phone] Fax: [phone]         [facility] [address] [place] [address]    Cheo Norton MD MD Ophthalmology   8/24/17     Phone: [phone] Fax: [phone]         [address] [facility] [address]    Laura Garcia, RN Nurse Coordinator Surgery Surgical Oncology Admissions 10/13/17     Phone: [phone] Pager: [phone]            My Medical and Care Information  Problem List   Patient Active Problem List   Diagnosis     CARDIOVASCULAR SCREENING; LDL GOAL LESS THAN 160     DUB (dysfunctional uterine bleeding)     Anxiety state     Esophageal reflux     Mild major depression (H)     Mild intermittent asthma without complication     Vision changes     Acute non intractable tension-type headache     Prothrombin mutation (H)     Metastatic malignant melanoma (H)     Malignant melanoma of left upper extremity including shoulder (H)     Melanoma (H)      Current Medications and Allergies:  See printed Medication Report

## 2017-10-26 NOTE — LETTER
[facility]  [address]  [facility] [address]  Phone: [phone]        October 26, 2017      Doretha Fernandez  [address] [place] [address]    Dear Doretha,  I am the Clinic Care Coordinator that works with your primary care provider's clinic. I wanted to introduce myself and provide you with my contact information for you to be able to call me with any questions or concerns. Below is a description of what Clinic Care Coordination is and how I can further assist you.     The Clinic Care Coordinator role is a Registered Nurse and/or  who understands the health care system. The goal of Clinic Care Coordination is to help you manage your health and improve access to the [facility] system in the most efficient manner.  The Registered Nurse can assist you in meeting your health care goals by providing education, coordinating services, and strengthening the communication among your providers. The  can assist you with financial, behavioral, psychosocial, and chemical dependency and counseling/psychiatric resources.    Please feel free to keep this letter and contact information to contact me at [phone], [phone] with any further questions or concerns that may arise. We at [facility] are focused on providing you with the highest-quality healthcare experience possible and that all starts with you.       Sincerely,     Сергей Mott RN  Clinic Care Coordinator    Enclosed: I have enclosed a copy of a 24 Hour Access Plan. This has helpful phone numbers for you to call when needed. Please keep this in an easy to access place to use as needed.

## 2017-10-27 ENCOUNTER — TELEPHONE (OUTPATIENT)
Dept: FAMILY MEDICINE | Facility: CLINIC | Age: 42
End: 2017-10-27

## 2017-10-27 ENCOUNTER — OFFICE VISIT (OUTPATIENT)
Dept: SURGERY | Facility: CLINIC | Age: 42
End: 2017-10-27
Attending: PHYSICIAN ASSISTANT
Payer: COMMERCIAL

## 2017-10-27 VITALS
SYSTOLIC BLOOD PRESSURE: 123 MMHG | HEIGHT: 63 IN | BODY MASS INDEX: 37.4 KG/M2 | DIASTOLIC BLOOD PRESSURE: 80 MMHG | TEMPERATURE: 98.3 F | HEART RATE: 83 BPM | WEIGHT: 211.1 LBS

## 2017-10-27 DIAGNOSIS — G89.18 POST-OP PAIN: Primary | ICD-10-CM

## 2017-10-27 LAB — COPATH REPORT: NORMAL

## 2017-10-27 PROCEDURE — 99212 OFFICE O/P EST SF 10 MIN: CPT | Mod: ZF

## 2017-10-27 PROCEDURE — 99024 POSTOP FOLLOW-UP VISIT: CPT | Mod: ZP | Performed by: PHYSICIAN ASSISTANT

## 2017-10-27 ASSESSMENT — PAIN SCALES - GENERAL: PAINLEVEL: WORST PAIN (10)

## 2017-10-27 NOTE — TELEPHONE ENCOUNTER
Reason for Call:  Other rash and pain    Detailed comments: Patient states she has a rash and extreme pain following surgery.    Phone Number Patient can be reached at: Home number on file [phone] (home)    Best Time: any    Can we leave a detailed message on this number? YES    Call taken on 10/27/2017 at 8:48 AM by Swapna Braxton

## 2017-10-27 NOTE — PATIENT INSTRUCTIONS
1. Continue oxycodone and tylenol as needed for pain.   2. Benadryl as needed for itching.  3. Milk of magnesia once today. Second dose the following day if no bowel movement today.   4. Miralax daily.   5. 64 ounces (8 glasses) of non-caffeinated fluid daily.   6. Continue stripping the drain and measure the output.   7. Follow up with Dr. Cool next week as scheduled.

## 2017-10-27 NOTE — LETTER
"10/27/2017       RE: Doretha Fernandez  [address] [address]  [place] [address]     Dear Colleague,    Thank you for referring your patient, Doretha Fernandez, to the [facility]. Please see a copy of my visit note below.    FOLLOW-UP  Oct 27, 2017    Doretha Fernandez is a 42 year old female who returns for her first post-operative follow-up visit.    HPI    She underwent a radical left axillary lymph node dissection for metastatic melanoma on 10/23/17.  She is currently  4 day(s) post-op.      Since the procedure, she has been having difficulty with pain control. She was given a refill of oxycodone 10/25/17. She now has an itchy rash that began yesterday. Currently itching is controlled with benadryl. She has continued axillary pain. She is taking 10 mg of oxycodone every 4 hours and tylenol. The oxycodone helps some. She has some drainage from around the drain. Her drain output was 30 ml yesterday and 50 ml. She feels sweaty but is afebrile. She has not had a bowel movement since surgery.     /80  Pulse 83  Temp 98.3  F (36.8  C) (Oral)  Ht 1.597 m (5' 2.87\")  Wt 95.8 kg (211 lb 1.6 oz)  LMP 10/07/2017  Breastfeeding? No  BMI 37.54 kg/m2   Physical Exam: Left axillary incision is clean and intact without bleeding or drainage. No ecchymosis or swelling. SHAY with serous output. Rish in distribution of surgical scrub.     ASSESSMENT:    Doretha Fernandez is a 42 year old female s/p radical left axillary lymph node dissection for metastatic melanoma on 10/23/17. Her rash appears do be secondary to the surgical scrub.      PLAN:  - Continue oxycodone and tylenol as needed for pain.   - Benadryl as needed for itching.  -  Milk of magnesia once today. Second dose the following day if no bowel movement today.   -  Miralax daily.   - 64 ounces (8 glasses) of non-caffeinated fluid daily.   - Continue stripping the drain and measure the output.    - Follow up with Dr. Cool 11/2/17 as scheduled. "     Jessica Castellano PA-C

## 2017-10-27 NOTE — NURSING NOTE
"Oncology Rooming Note    October 27, 2017 11:56 AM   Doretha Fernandez is a 42 year old female who presents for:    Chief Complaint   Patient presents with     Oncology Clinic Visit     Malignant melanoma of left upper extremity including shoulder  f/u      Initial Vitals: /80  Pulse 83  Temp 98.3  F (36.8  C) (Oral)  Ht 1.597 m (5' 2.87\")  Wt 95.8 kg (211 lb 1.6 oz)  LMP 10/07/2017  Breastfeeding? No  BMI 37.54 kg/m2 Estimated body mass index is 37.54 kg/(m^2) as calculated from the following:    Height as of this encounter: 1.597 m (5' 2.87\").    Weight as of this encounter: 95.8 kg (211 lb 1.6 oz). Body surface area is 2.06 meters squared.  Data Unavailable Comment: Data Unavailable   Patient's last menstrual period was 10/07/2017.  Allergies reviewed: Yes  Medications reviewed: Yes    Medications: Medication refills not needed today.  Pharmacy name entered into [facility]: [place], [place] - [address]    Clinical concerns: Rash all over body. Drainage from tube .  Nona  was notified.    7 minutes for nursing intake (face to face time)     Amie Cortés              "

## 2017-10-27 NOTE — MR AVS SNAPSHOT
After Visit Summary   10/27/2017    Doretha Fernandez    MRN: [id number]           Patient Information     Date Of Birth          1975        Visit Information        Provider Department      10/27/2017 11:30 AM Jessica Castellano PA-C [facility]        Care Instructions    1. Continue oxycodone and tylenol as needed for pain.   2. Benadryl as needed for itching.  3. Milk of magnesia once today. Second dose the following day if no bowel movement today.   4. Miralax daily.   5. 64 ounces (8 glasses) of non-caffeinated fluid daily.   6. Continue stripping the drain and measure the output.   7. Follow up with Dr. Cool next week as scheduled.           Follow-ups after your visit        Your next 10 appointments already scheduled     Nov 02, 2017  9:00 AM CDT   (Arrive by 8:45 AM)   Post-Op with Laurent Cool MD   [facility] ([facility])    [address]  2nd [facility] [address] [phone]            Nov 02, 2017 10:30 AM CDT   Return Visit with Kathy Richards MD   [facility] ([facility])    [facility]  [address]  [facility] [address] [phone]            Nov 15, 2017  2:00 PM CST   (Arrive by 1:45 PM)   Return Visit with Bairon Miranda MD   [facility] Neurology ([facility])    [address]  3rd Floor  [facility] [address] [phone]            Jan 09, 2018 10:00 AM CST   Return Visit with Lowell Bullock MD   [facility] ([facility])    [address] [address]   [phone]              Who to contact     If you have questions or need follow up information about today's clinic visit or your schedule please contact [facility] directly at [phone].  Normal or non-critical lab and imaging results will be communicated to you  "by MyChart, letter or phone within 4 business days after the clinic has received the results. If you do not hear from us within 7 days, please contact the clinic through [facility] or phone. If you have a critical or abnormal lab result, we will notify you by phone as soon as possible.  Submit refill requests through [facility] or call your pharmacy and they will forward the refill request to us. Please allow 3 business days for your refill to be completed.          Additional Information About Your Visit        [facility] [facility] Information     [facility] [facility] gives you secure access to your electronic health record. If you see a primary care provider, you can also send messages to your care team and make appointments. If you have questions, please call your primary care clinic.  If you do not have a primary care provider, please call [phone] and they will assist you.        Care EveryWhere ID     This is your Care EveryWhere ID. This could be used by other organizations to access your [place] medical records  [id number]        Your Vitals Were     Pulse Temperature Height Last Period Breastfeeding? BMI (Body Mass Index)    83 98.3  F (36.8  C) (Oral) 1.597 m (5' 2.87\") 10/07/2017 No 37.54 kg/m2       Blood Pressure from Last 3 Encounters:   10/27/17 123/80   10/24/17 90/49   10/20/17 (!) 134/100    Weight from Last 3 Encounters:   10/27/17 95.8 kg (211 lb 1.6 oz)   10/23/17 94.5 kg (208 lb 5.4 oz)   10/18/17 94.7 kg (208 lb 12.8 oz)              Today, you had the following     No orders found for display       Primary Care Provider Office Phone # Fax #    Anna Naidu [phone] [address] [facility] [address]        Equal Access to Services     BRIGETTE QUARLES : Nicol Gustafson, melba valencia, qaybta kaalpancho wong. So [facility] [phone].    ATENCIÓN: Si habla español, tiene a mcdonnell disposición servicios gratuitos de asistencia " lingüísticaJames Ortiz al [phone].    We comply with applicable federal civil rights laws and [place] laws. We do not discriminate on the basis of race, color, national origin, age, disability, sex, sexual orientation, or gender identity.            Thank you!     Thank you for choosing [facility]  for your care. Our goal is always to provide you with excellent care. Hearing back from our patients is one way we can continue to improve our services. Please take a few minutes to complete the written survey that you may receive in the mail after your visit with us. Thank you!             Your Updated Medication List - Protect others around you: Learn how to safely use, store and throw away your medicines at www.disposemymeds.org.          This list is accurate as of: 10/27/17 12:30 PM.  Always use your most recent med list.                   Brand Name Dispense Instructions for use Diagnosis    * acetaminophen 325 MG tablet    TYLENOL    100 tablet    Take 2 tablets (650 mg) by mouth every 4 hours as needed for other (mild pain)    Malignant melanoma of left upper extremity including shoulder (H)       * acetaminophen 325 MG tablet    TYLENOL    50 tablet    Take 2 tablets (650 mg) by mouth once as needed for mild pain (to moderate pain)    Postoperative pain       FLUoxetine 20 MG capsule    PROzac     Take by mouth daily        LORazepam 1 MG tablet    ATIVAN    30 tablet    Take 1 tablet (1 mg) by mouth every 8 hours as needed for anxiety    Anxiety, Acute intractable tension-type headache       * oxyCODONE 5 MG IR tablet    ROXICODONE    20 tablet    Take 1-2 tablets (5-10 mg) by mouth every 4 hours as needed for moderate to severe pain .  Space out doses as able to wean yourself off of narcotic medication as soon as possible.    Malignant melanoma of left upper extremity including shoulder (H)       * oxyCODONE 5 MG IR tablet    ROXICODONE    60 tablet    Take 1-2 tablets (5-10 mg) by mouth  every 4 hours as needed for pain    Post-op pain       senna-docusate 8.6-50 MG per tablet    SENOKOT-S;PERICOLACE    60 tablet    Take 1 tablet by mouth 2 times daily as needed for constipation    Malignant melanoma of left upper extremity including shoulder (H)       venlafaxine 37.5 MG 24 hr capsule    EFFEXOR-XR    46 capsule    Take 1 capsule daily for 14 days, then take 2 capsules daily.    Major depressive disorder, recurrent episode, moderate (H), SANDRA (generalized anxiety disorder)       Vitamin D3 3000 UNITS Tabs     30 tablet    Take 3,000 Int'l Units by mouth daily    Vitamin D deficiency       zonisamide 25 MG capsule    Zonegran    90 capsule    Take 1 tablet (25 mg) once daily for 1 week, then 2 tablets once daily for 1 week, then 3 tablets once daily for 1 week, then 4 tablets once daily for 1 week.    Headache disorder       * Notice:  This list has 4 medication(s) that are the same as other medications prescribed for you. Read the directions carefully, and ask your doctor or other care provider to review them with you.

## 2017-10-27 NOTE — PROGRESS NOTES
"FOLLOW-UP  Oct 27, 2017    Doretha Fernandez is a 42 year old female who returns for her first post-operative follow-up visit.    HPI    She underwent a radical left axillary lymph node dissection for metastatic melanoma on 10/23/17.  She is currently  4 day(s) post-op.      Since the procedure, she has been having difficulty with pain control. She was given a refill of oxycodone 10/25/17. She now has an itchy rash that began yesterday. Currently itching is controlled with benadryl. She has continued axillary pain. She is taking 10 mg of oxycodone every 4 hours and tylenol. The oxycodone helps some. She has some drainage from around the drain. Her drain output was 30 ml yesterday and 50 ml. She feels sweaty but is afebrile. She has not had a bowel movement since surgery.     /80  Pulse 83  Temp 98.3  F (36.8  C) (Oral)  Ht 1.597 m (5' 2.87\")  Wt 95.8 kg (211 lb 1.6 oz)  LMP 10/07/2017  Breastfeeding? No  BMI 37.54 kg/m2   Physical Exam: Left axillary incision is clean and intact without bleeding or drainage. No ecchymosis or swelling. SHAY with serous output. Rish in distribution of surgical scrub.     ASSESSMENT:    Doretha Fernandez is a 42 year old female s/p radical left axillary lymph node dissection for metastatic melanoma on 10/23/17. Her rash appears do be secondary to the surgical scrub.      PLAN:  - Continue oxycodone and tylenol as needed for pain.   - Benadryl as needed for itching.  -  Milk of magnesia once today. Second dose the following day if no bowel movement today.   -  Miralax daily.   - 64 ounces (8 glasses) of non-caffeinated fluid daily.   - Continue stripping the drain and measure the output.    - Follow up with Dr. Cool 11/2/17 as scheduled.     Jessica Castellano PA-C  "

## 2017-10-31 ENCOUNTER — TELEPHONE (OUTPATIENT)
Dept: ONCOLOGY | Facility: CLINIC | Age: 42
End: 2017-10-31

## 2017-10-31 NOTE — TELEPHONE ENCOUNTER
Call to pt who had left axillary lymph node dissection for metastatic melanoma. Her pathology is finalized and she had one lymph node with cancer out of 20 lymph nodes removed. Pt reports she still has discomfort but is managing. She has post-op with Dr Cool on 11/2 and return with Dr Richards same day.

## 2017-11-02 ENCOUNTER — ONCOLOGY VISIT (OUTPATIENT)
Dept: ONCOLOGY | Facility: CLINIC | Age: 42
End: 2017-11-02
Attending: INTERNAL MEDICINE
Payer: COMMERCIAL

## 2017-11-02 ENCOUNTER — OFFICE VISIT (OUTPATIENT)
Dept: ONCOLOGY | Facility: CLINIC | Age: 42
End: 2017-11-02
Attending: SURGERY
Payer: COMMERCIAL

## 2017-11-02 VITALS
DIASTOLIC BLOOD PRESSURE: 76 MMHG | WEIGHT: 211.3 LBS | OXYGEN SATURATION: 96 % | HEART RATE: 86 BPM | BODY MASS INDEX: 37.58 KG/M2 | SYSTOLIC BLOOD PRESSURE: 115 MMHG | TEMPERATURE: 97 F

## 2017-11-02 VITALS
HEIGHT: 63 IN | SYSTOLIC BLOOD PRESSURE: 119 MMHG | OXYGEN SATURATION: 96 % | BODY MASS INDEX: 37.53 KG/M2 | WEIGHT: 211.8 LBS | DIASTOLIC BLOOD PRESSURE: 67 MMHG | RESPIRATION RATE: 16 BRPM | HEART RATE: 87 BPM | TEMPERATURE: 96.2 F

## 2017-11-02 DIAGNOSIS — C43.9 METASTATIC MALIGNANT MELANOMA (H): Primary | ICD-10-CM

## 2017-11-02 DIAGNOSIS — C43.62 MALIGNANT MELANOMA OF LEFT UPPER EXTREMITY INCLUDING SHOULDER (H): ICD-10-CM

## 2017-11-02 DIAGNOSIS — C43.62 MALIGNANT MELANOMA OF LEFT UPPER EXTREMITY INCLUDING SHOULDER (H): Primary | ICD-10-CM

## 2017-11-02 PROCEDURE — 25000128 H RX IP 250 OP 636: Performed by: INTERNAL MEDICINE

## 2017-11-02 PROCEDURE — 99213 OFFICE O/P EST LOW 20 MIN: CPT | Mod: ZF

## 2017-11-02 PROCEDURE — 99215 OFFICE O/P EST HI 40 MIN: CPT | Performed by: INTERNAL MEDICINE

## 2017-11-02 PROCEDURE — 90686 IIV4 VACC NO PRSV 0.5 ML IM: CPT | Performed by: INTERNAL MEDICINE

## 2017-11-02 PROCEDURE — 99211 OFF/OP EST MAY X REQ PHY/QHP: CPT | Mod: 27

## 2017-11-02 PROCEDURE — G0008 ADMIN INFLUENZA VIRUS VAC: HCPCS

## 2017-11-02 RX ORDER — DIAZEPAM 10 MG
TABLET ORAL
Qty: 1 TABLET | Refills: 0 | Status: SHIPPED | OUTPATIENT
Start: 2017-11-02 | End: 2017-11-14

## 2017-11-02 RX ADMIN — INFLUENZA A VIRUS A/MICHIGAN/45/2015 X-275 (H1N1) ANTIGEN (FORMALDEHYDE INACTIVATED), INFLUENZA A VIRUS A/HONG KONG/4801/2014 X-263B (H3N2) ANTIGEN (FORMALDEHYDE INACTIVATED), INFLUENZA B VIRUS B/PHUKET/3073/2013 ANTIGEN (FORMALDEHYDE INACTIVATED), AND INFLUENZA B VIRUS B/BRISBANE/60/2008 ANTIGEN (FORMALDEHYDE INACTIVATED) 0.5 ML: 15; 15; 15; 15 INJECTION, SUSPENSION INTRAMUSCULAR at 11:41

## 2017-11-02 ASSESSMENT — ENCOUNTER SYMPTOMS
SEIZURES: 0
VOMITING: 0
NIGHT SWEATS: 1
SPEECH CHANGE: 0
ALTERED TEMPERATURE REGULATION: 1
DECREASED APPETITE: 1
WEAKNESS: 1
WEIGHT GAIN: 1
SWOLLEN GLANDS: 1
POOR WOUND HEALING: 1
JAUNDICE: 0
BRUISES/BLEEDS EASILY: 1
FEVER: 0
INCREASED ENERGY: 0
POLYPHAGIA: 0
NAUSEA: 1
PANIC: 1
CHILLS: 0
BLOOD IN STOOL: 0
DIARRHEA: 0
MUSCLE CRAMPS: 1
FATIGUE: 1
NUMBNESS: 1
DIZZINESS: 0
INSOMNIA: 1
NAIL CHANGES: 0
MUSCLE WEAKNESS: 1
TINGLING: 1
LOSS OF CONSCIOUSNESS: 0
WEIGHT LOSS: 0
ARTHRALGIAS: 1
NERVOUS/ANXIOUS: 1
MYALGIAS: 1
DISTURBANCES IN COORDINATION: 0
HEARTBURN: 0
MEMORY LOSS: 0
BOWEL INCONTINENCE: 0
HALLUCINATIONS: 0
CONSTIPATION: 1
DEPRESSION: 1
SKIN CHANGES: 0
NECK PAIN: 1
HEADACHES: 1
ABDOMINAL PAIN: 0
TREMORS: 0
BLOATING: 0
DECREASED CONCENTRATION: 1
JOINT SWELLING: 1
RECTAL PAIN: 0
BACK PAIN: 1
PARALYSIS: 0
STIFFNESS: 1
POLYDIPSIA: 1

## 2017-11-02 ASSESSMENT — PAIN SCALES - GENERAL
PAINLEVEL: SEVERE PAIN (7)
PAINLEVEL: SEVERE PAIN (7)

## 2017-11-02 NOTE — LETTER
11/2/2017       RE: Doretha Fernandez  [address]  [place] [address]     Dear Colleague,    Thank you for referring your patient, Doretha Fernandez, to the [facility] at [facility]. Please see a copy of my visit note below.    Doretha Fernandez is here for a postoperative visit after undergoing a left axillary lymph node dissection for metastatic melanoma.  Her final pathology report revealed 1 of 20 positive lymph nodes.  It was 4.5 cm in size.  There was no extranodal extension.  She has done well since her surgery.  Her drain has been clotted, however, and today I pulled the drain back a little bit, declotted it and then resecured it to her skin.  She tolerated this well.  She is going to see Dr. Richards today to discuss adjuvant therapy.  I will see her next week for potential removal of her drain.         Again, thank you for allowing me to participate in the care of your patient.      Sincerely,    Laurent Cool MD

## 2017-11-02 NOTE — MR AVS SNAPSHOT
After Visit Summary   11/2/2017    Doretha Fernandez    MRN: [id number]           Patient Information     Date Of Birth          1975        Visit Information        Provider Department      11/2/2017 10:30 AM Kathy Richards MD [facility] ONCOL[facility]      Today's Diagnoses     Metastatic malignant melanoma (H)    -  1      Care Instructions    You will need to have an MRI of the brain this week if possible. Please keep your appointment at [facility] next week.      You will receive information today on Opdivo. We would like to see you back in clinic with Dr. Richards in 2 weeks with start of Opdivo.      Copy of appointments, and after visit summary (AVS) given to patient.  If you have any questions during business hours (M-F 8 AM- 4PM), please call Jeanie Keenan RN, BSN, OCN Oncology Hematology /Breast Cancer Navigator at [facility] [phone].       For questions after business hours, or on holidays/weekends, please call our after hours Nurse Triage line [phone]. Thank you.            Follow-ups after your visit        Your next 10 appointments already scheduled     Nov 06, 2017  8:15 AM CST   MR BRAIN W/O & W CONTRAST with [address] MRI ([facility])    [address] [facility] [address] [phone]           Take your medicines as usual, unless your doctor tells you not to. Bring a list of your current medicines to your exam (including vitamins, minerals and over-the-counter drugs).  You will be given intravenous contrast for this exam. To prepare:   The day before your exam, drink extra fluids at least six 8-ounce glasses (unless your doctor tells you to restrict your fluids).   Have a blood test (creatinine test) within 30 days of your exam. Go to your clinic or Diagnostic Imaging Department for this test.  The MRI machine uses a strong magnet. Please wear clothes without metal (snaps,  zippers). A sweatsuit works well, or we may give you a hospital gown.  Please remove any body piercings and hair extensions before you arrive. You will also remove watches, jewelry, hairpins, wallets, dentures, partial dental plates and hearing aids. You may wear contact lenses, and you may be able to wear your rings. We have a safe place to keep your personal items, but it is safer to leave them at home.   **IMPORTANT** THE INSTRUCTIONS BELOW ARE ONLY FOR THOSE PATIENTS WHO HAVE BEEN TOLD THEY WILL RECEIVE SEDATION OR GENERAL ANESTHESIA DURING THEIR MRI PROCEDURE:  IF YOU WILL RECEIVE SEDATION (take medicine to help you relax during your exam):   You must get the medicine from your doctor before you arrive. Bring the medicine to the exam. Do not take it at home.   Arrive one hour early. Bring someone who can take you home after the test. Your medicine will make you sleepy. After the exam, you may not drive, take a bus or take a taxi by yourself.   No eating 8 hours before your exam. You may have clear liquids up until 4 hours before your exam. (Clear liquids include water, clear tea, black coffee and fruit juice without pulp.)  IF YOU WILL RECEIVE ANESTHESIA (be asleep for your exam):   Arrive 1 1/2 hours early. Bring someone who can take you home after the test. You may not drive, take a bus or take a taxi by yourself.   No eating 8 hours before your exam. You may have clear liquids up until 4 hours before your exam. (Clear liquids include water, clear tea, black coffee and fruit juice without pulp.)  Please call the Imaging Department at your exam site with any questions.            Nov 09, 2017  9:00 AM CST   (Arrive by 8:45 AM)   Post-Op with Laurent Cool MD   [facility] ([facility])    [address] [place] [address]  [address] [facility] [address] [phone]            Nov 15, 2017  2:00 PM CST   (Arrive by 1:45 PM)   Return Visit with Bairon Miranda MD   [facility]  Neurology ([facility])    [address]  [address] [facility] [address]   [phone]            Nov 16, 2017  9:00 AM CST   LAB with [facility] ([facility])    [address] [address]   [phone]           Please do not eat 10-12 hours before your appointment if you are coming in fasting for labs on lipids, cholesterol, or glucose (sugar). This does not apply to pregnant women. Water, hot tea and black coffee (with nothing added) are okay. Do not drink other fluids, diet soda or chew gum.            Nov 16, 2017  9:30 AM CST   Return Visit with Kathy Richards MD   [facility] ([facility])    [facility]  [address]  [facility] [address]   [phone]            Nov 16, 2017 10:00 AM CST   Level 2 with ROOM 5 [facility] ([facility])    [facility]  [address]  [facility] [address]   [phone]            Nov 30, 2017  9:30 AM CST   Level 2 with ROOM 3 [facility] ([facility])    [facility]  [address]  [facility] [address]   [phone]            Jan 09, 2018 10:00 AM CST   Return Visit with Lowell Bullock MD   [facility] ([facility])    [address] [facility] [address]   [phone]              Future tests that were ordered for you today     Open Future Orders        Priority Expected Expires Ordered    MR Brain w/o & w Contrast Routine 11/2/2017 12/17/2017 11/2/2017            Who to contact     If you have questions or need follow up information about today's clinic visit or your schedule please contact [facility] directly at [phone].  Normal or non-critical lab and imaging results will be communicated to you by MyChart, letter or phone  "within 4 business days after the clinic has received the results. If you do not hear from us within 7 days, please contact the clinic through [facility] or phone. If you have a critical or abnormal lab result, we will notify you by phone as soon as possible.  Submit refill requests through [facility] or call your pharmacy and they will forward the refill request to us. Please allow 3 business days for your refill to be completed.          Additional Information About Your Visit        [facility] Information     [facility] gives you secure access to your electronic health record. If you see a primary care provider, you can also send messages to your care team and make appointments. If you have questions, please call your primary care clinic.  If you do not have a primary care provider, please call [phone] and they will assist you.        Care EveryWhere ID     This is your Care EveryWhere ID. This could be used by other organizations to access your [place] medical records  [id number]        Your Vitals Were     Pulse Temperature Respirations Height Last Period Pulse Oximetry    87 96.2  F (35.7  C) (Tympanic) 16 1.597 m (5' 2.87\") 11/02/2017 (Exact Date) 96%    Breastfeeding? BMI (Body Mass Index)                No 37.67 kg/m2           Blood Pressure from Last 3 Encounters:   11/02/17 119/67   11/02/17 115/76   10/27/17 123/80    Weight from Last 3 Encounters:   11/02/17 96.1 kg (211 lb 12.8 oz)   11/02/17 95.8 kg (211 lb 4.8 oz)   10/27/17 95.8 kg (211 lb 1.6 oz)               Primary Care Provider Office Phone # Fax #    Anna Naidu [phone] [address] [facility] [address]        Equal Access to Services     DISHA QUARLES AH: Nicol Gustafson, melba valencia, paulina perez, pancho castillo. So [facility] [phone].    ATENCIÓN: Si habla español, tiene a mcdonnell disposición servicios gratuitos de asistencia lingüística. Llame al " [phone].    We comply with applicable federal civil rights laws and [place] laws. We do not discriminate on the basis of race, color, national origin, age, disability, sex, sexual orientation, or gender identity.            Thank you!     Thank you for choosing [facility]  for your care. Our goal is always to provide you with excellent care. Hearing back from our patients is one way we can continue to improve our services. Please take a few minutes to complete the written survey that you may receive in the mail after your visit with us. Thank you!             Your Updated Medication List - Protect others around you: Learn how to safely use, store and throw away your medicines at www.disposemymeds.org.          This list is accurate as of: 11/2/17 11:44 AM.  Always use your most recent med list.                   Brand Name Dispense Instructions for use Diagnosis    acetaminophen 325 MG tablet    TYLENOL    100 tablet    Take 2 tablets (650 mg) by mouth every 4 hours as needed for other (mild pain)    Malignant melanoma of left upper extremity including shoulder (H)       FLUoxetine 20 MG capsule    PROzac     Take by mouth daily        LORazepam 1 MG tablet    ATIVAN    30 tablet    Take 1 tablet (1 mg) by mouth every 8 hours as needed for anxiety    Anxiety, Acute intractable tension-type headache       oxyCODONE IR 5 MG tablet    ROXICODONE    20 tablet    Take 1-2 tablets (5-10 mg) by mouth every 4 hours as needed for moderate to severe pain .  Space out doses as able to wean yourself off of narcotic medication as soon as possible.    Malignant melanoma of left upper extremity including shoulder (H)       senna-docusate 8.6-50 MG per tablet    SENOKOT-S;PERICOLACE    60 tablet    Take 1 tablet by mouth 2 times daily as needed for constipation    Malignant melanoma of left upper extremity including shoulder (H)       venlafaxine 37.5 MG 24 hr capsule    EFFEXOR-XR    46 capsule    Take 1 capsule  daily for 14 days, then take 2 capsules daily.    Major depressive disorder, recurrent episode, moderate (H), SANDRA (generalized anxiety disorder)       Vitamin D3 3000 UNITS Tabs     30 tablet    Take 3,000 Int'l Units by mouth daily    Vitamin D deficiency       zonisamide 25 MG capsule    Zonegran    90 capsule    Take 1 tablet (25 mg) once daily for 1 week, then 2 tablets once daily for 1 week, then 3 tablets once daily for 1 week, then 4 tablets once daily for 1 week.    Headache disorder

## 2017-11-02 NOTE — NURSING NOTE
"Oncology Rooming Note    November 2, 2017 9:01 AM   Doretha Fernandez is a 42 year old female who presents for:    Chief Complaint   Patient presents with     Oncology Clinic Visit     Initial Vitals: /76  Pulse 86  Temp 97  F (36.1  C) (Oral)  Wt 95.8 kg (211 lb 4.8 oz)  LMP 11/02/2017 (Exact Date)  SpO2 96%  BMI 37.58 kg/m2 Estimated body mass index is 37.58 kg/(m^2) as calculated from the following:    Height as of 10/27/17: 1.597 m (5' 2.87\").    Weight as of this encounter: 95.8 kg (211 lb 4.8 oz). Body surface area is 2.06 meters squared.  Severe Pain (7) Comment: left armpit    Patient's last menstrual period was 11/02/2017 (exact date).  Allergies reviewed: Yes  Medications reviewed: Yes    Medications: Medication refills not needed today.  Pharmacy name entered into [facility]: [facility] [place], [place] - [address]    Clinical concerns: no clinical concerns  Dr. Cool  was NOT notified.    9 minutes for nursing intake (face to face time)     Amie Cortés              "

## 2017-11-02 NOTE — PROGRESS NOTES
Doretha Fernandez is here for a postoperative visit after undergoing a left axillary lymph node dissection for metastatic melanoma.  Her final pathology report revealed 1 of 20 positive lymph nodes.  It was 4.5 cm in size.  There was no extranodal extension.  She has done well since her surgery.  Her drain has been clotted, however, and today I pulled the drain back a little bit, declotted it and then resecured it to her skin.  She tolerated this well.  She is going to see Dr. Richards today to discuss adjuvant therapy.  I will see her next week for potential removal of her drain.

## 2017-11-02 NOTE — MR AVS SNAPSHOT
After Visit Summary   11/2/2017    Doretha Fernandez    MRN: [id number]           Patient Information     Date Of Birth          1975        Visit Information        Provider Department      11/2/2017 9:00 AM Laurent Cool MD [facility]        Today's Diagnoses     Malignant melanoma of left upper extremity including shoulder (H)    -  1       Follow-ups after your visit        Your next 10 appointments already scheduled     Nov 09, 2017  9:00 AM CST   (Arrive by 8:45 AM)   Post-Op with Laurent Cool MD   [facility] ([facility])    [address] [place] [address]  [address] [facility] [address]   [phone]            Nov 15, 2017  2:00 PM CST   (Arrive by 1:45 PM)   Return Visit with Bairon Miranda MD   [facility] Neurology ([facility])    [address] [place] [address]  3rd [facility] [id number] [phone]            Nov 16, 2017  9:00 AM CST   LAB with [facility] ([facility])    [address] [facility] [address]   [phone]           Please do not eat 10-12 hours before your appointment if you are coming in fasting for labs on lipids, cholesterol, or glucose (sugar). This does not apply to pregnant women. Water, hot tea and black coffee (with nothing added) are okay. Do not drink other fluids, diet soda or chew gum.            Nov 16, 2017  9:30 AM CST   Return Visit with Kathy Richards MD   [facility] ([facility])    [facility]  [address]  [facility] [address]   [phone]            Nov 16, 2017 10:00 AM CST   Level 2 with ROOM 5 [facility] ([facility])    [facility]  [address]  [facility] [address]   [phone]            Nov 30, 2017  9:30 AM CST   Level 2 with ROOM 3 [facility] ([facility])    [facility]  [address]  [facility] [address]   [phone]            Jan 09, 2018 10:00 AM CST   Return Visit with Lowell Bullock MD   [facility] ([facility])    [address]  [facility] [address]   [phone]              Who to contact     If you have questions or need follow up information about today's clinic visit or your schedule please contact [facility] directly at [phone].  Normal or non-critical lab and imaging results will be communicated to you by [employer], letter or phone within 4 business days after the clinic has received the results. If you do not hear from us within 7 days, please contact the clinic through [employer] or phone. If you have a critical or abnormal lab result, we will notify you by phone as soon as possible.  Submit refill requests through [employer] or call your pharmacy and they will forward the refill request to us. Please allow 3 business days for your refill to be completed.          Additional Information About Your Visit        MyChart Information     [employer] gives you secure access to your electronic health record. If you see a primary care provider, you can also send messages to your care team and make appointments. If you have questions, please call your primary care clinic.  If you do not have a primary care provider, please call [phone] and they will assist you.        Care EveryWhere ID     This is your Care EveryWhere ID. This could be used by other organizations to access your [facility] medical records  [id number]        Your Vitals Were     Pulse Temperature Last Period Pulse Oximetry BMI (Body Mass Index)       86 97  F (36.1  C) (Oral) 11/02/2017 (Exact Date) 96% 37.58 kg/m2        Blood Pressure from Last 3 Encounters:   11/02/17 119/67   11/02/17 115/76   10/27/17 123/80    Weight from Last 3 Encounters:   11/02/17 96.1 kg (211 lb 12.8 oz)    11/02/17 95.8 kg (211 lb 4.8 oz)   10/27/17 95.8 kg (211 lb 1.6 oz)              Today, you had the following     No orders found for display         Today's Medication Changes          These changes are accurate as of: 11/2/17 11:59 PM.  If you have any questions, ask your nurse or doctor.               Start taking these medicines.        Dose/Directions    diazepam 10 MG tablet   Commonly known as:  VALIUM   Started by:  Kathy Richards MD        Take 10 mg (1 tablet) by mouth 30-60 minutes before procedure for claustrophobia. Do not operate a vehicle after taking this medication.   Quantity:  1 tablet   Refills:  0            Where to get your medicines      Some of these will need a paper prescription and others can be bought over the counter.  Ask your nurse if you have questions.     Bring a paper prescription for each of these medications     diazepam 10 MG tablet                Primary Care Provider Office Phone # Fax #    Anna Naidu [phone] [address] [facility] [address]        Equal Access to Services     [facility] AH: Hadii josy ku hadasho Soomaali, waaxda luqadaha, qaybta kaalmada adeegyada, waxay anyain haycolin moore . So [facility] [phone].    ATENCIÓN: Si habla español, tiene a mcdonnell disposición servicios gratuitos de asistencia lingüística. Llame al [phone].    We comply with applicable federal civil rights laws and [place] laws. We do not discriminate on the basis of race, color, national origin, age, disability, sex, sexual orientation, or gender identity.            Thank you!     Thank you for choosing [facility]  for your care. Our goal is always to provide you with excellent care. Hearing back from our patients is one way we can continue to improve our services. Please take a few minutes to complete the written survey that you may receive in the mail after your visit with us. Thank you!             Your Updated  Medication List - Protect others around you: Learn how to safely use, store and throw away your medicines at www.disposemymeds.org.          This list is accurate as of: 11/2/17 11:59 PM.  Always use your most recent med list.                   Brand Name Dispense Instructions for use Diagnosis    acetaminophen 325 MG tablet    TYLENOL    100 tablet    Take 2 tablets (650 mg) by mouth every 4 hours as needed for other (mild pain)    Malignant melanoma of left upper extremity including shoulder (H)       diazepam 10 MG tablet    VALIUM    1 tablet    Take 10 mg (1 tablet) by mouth 30-60 minutes before procedure for claustrophobia. Do not operate a vehicle after taking this medication.        FLUoxetine 20 MG capsule    PROzac     Take by mouth daily        LORazepam 1 MG tablet    ATIVAN    30 tablet    Take 1 tablet (1 mg) by mouth every 8 hours as needed for anxiety    Anxiety, Acute intractable tension-type headache       oxyCODONE IR 5 MG tablet    ROXICODONE    20 tablet    Take 1-2 tablets (5-10 mg) by mouth every 4 hours as needed for moderate to severe pain .  Space out doses as able to wean yourself off of narcotic medication as soon as possible.    Malignant melanoma of left upper extremity including shoulder (H)       senna-docusate 8.6-50 MG per tablet    SENOKOT-S;PERICOLACE    60 tablet    Take 1 tablet by mouth 2 times daily as needed for constipation    Malignant melanoma of left upper extremity including shoulder (H)       venlafaxine 37.5 MG 24 hr capsule    EFFEXOR-XR    46 capsule    Take 1 capsule daily for 14 days, then take 2 capsules daily.    Major depressive disorder, recurrent episode, moderate (H), SANDRA (generalized anxiety disorder)       Vitamin D3 3000 UNITS Tabs     30 tablet    Take 3,000 Int'l Units by mouth daily    Vitamin D deficiency       zonisamide 25 MG capsule    Zonegran    90 capsule    Take 1 tablet (25 mg) once daily for 1 week, then 2 tablets once daily for 1 week, then 3  tablets once daily for 1 week, then 4 tablets once daily for 1 week.    Headache disorder

## 2017-11-02 NOTE — NURSING NOTE
"Oncology Rooming Note    November 2, 2017 10:38 AM   Doretha Fernandze is a 42 year old female who presents for:    Chief Complaint   Patient presents with     Oncology Clinic Visit     Post op follow up malignant melanoma. Review biopsy results.      Initial Vitals: /67 (BP Location: Right arm, Patient Position: Sitting, Cuff Size: Adult Regular)  Pulse 87  Temp 96.2  F (35.7  C) (Tympanic)  Resp 16  Ht 1.597 m (5' 2.87\")  Wt 96.1 kg (211 lb 12.8 oz)  LMP 11/02/2017 (Exact Date)  SpO2 96%  Breastfeeding? No  BMI 37.67 kg/m2 Estimated body mass index is 37.67 kg/(m^2) as calculated from the following:    Height as of this encounter: 1.597 m (5' 2.87\").    Weight as of this encounter: 96.1 kg (211 lb 12.8 oz). Body surface area is 2.06 meters squared.  Severe Pain (7) Comment: left armpit.    Patient's last menstrual period was 11/02/2017 (exact date).  Allergies reviewed: Yes  Medications reviewed: Yes    Medications: Medication refills not needed today.  Pharmacy name entered into [facility]: [facility] [place], [place] - [address]    Clinical concerns: Post op follow up malignant melanoma. Review biopsy results. C/o 7/10 left side underneath her armpit. Also with rash across belly and chest since surgery 10/23/17.     7 minutes for nursing intake (face to face time)     Dai Canales CMA            "

## 2017-11-02 NOTE — PROGRESS NOTES
"Injectable Influenza Immunization Documentation    1.  Has the patient received the information for the injectable influenza vaccine? YES     2. Is the patient 6 months of age or older? YES     3. Does the patient have any of the following contraindications?         Severe allergy to eggs?  No     Severe allergic reaction to previous influenza vaccines?  No   Severe allergy to latex?  No       History of Guillain-Buhl syndrome?  No     Currently have a temperature greater than 100.4F?  No 97.5F orally today        4.  Severely egg allergic patients should have flu vaccine eligibility assessed by an MD, RN, or pharmacist, and those who received flu vaccine should be observed for 15 min by an MD, RN, Pharmacist, Medical Technician, or member of clinic staff.\": N/A  5. Latex-allergic patients should be given latex-free influenza vaccine N/A. Please reference the Vaccine latex table to determine if your clinic s product is latex-containing.       Vaccination given by Jeanie Keenan RN, BSN, OCN        "

## 2017-11-02 NOTE — LETTER
"    11/2/2017         RE: Doretha Fernandez  [address]  [place] [address]        Dear Colleague,    Thank you for referring your patient, Doretha Fernandez, to the [facility]. Please see a copy of my visit note below.    Injectable Influenza Immunization Documentation    1.  Has the patient received the information for the injectable influenza vaccine? YES     2. Is the patient 6 months of age or older? YES     3. Does the patient have any of the following contraindications?         Severe allergy to eggs?  No     Severe allergic reaction to previous influenza vaccines?  No   Severe allergy to latex?  No       History of Guillain-Calais syndrome?  No     Currently have a temperature greater than 100.4F?  No 97.5F orally today        4.  Severely egg allergic patients should have flu vaccine eligibility assessed by an MD, RN, or pharmacist, and those who received flu vaccine should be observed for 15 min by an MD, RN, Pharmacist, Medical Technician, or member of clinic staff.\": N/A  5. Latex-allergic patients should be given latex-free influenza vaccine N/A. Please reference the Vaccine latex table to determine if your clinic s product is latex-containing.       Vaccination given by Jeanie Keenan RN, BSN, OCN          DATE OF VISIT: Nov 2, 2017    Doretha Fernandez is a 42 year old female is seen today for   Chief Complaint   Patient presents with     Oncology Clinic Visit     Post op follow up malignant melanoma. Review biopsy results.    .       (C79.9) Metastatic malignant melanoma (H)  (primary encounter diagnosis)  Patient is here today to review surgical pathology. She has axillary lymph node dissection. One lymph node out of 20 came back with metastatic melanoma. There was no extranodal extension. We reviewed management option at this time. Today we will arrange for MRI of the brain to continue with staging workup. Patient was referred to see radiation oncology for possible radiation therapy to " the left axilla. We talked about adjuvant therapy. We talked about recent data of immunotherapy. We also talked about interferon as well. I would recommend use of immunotherapy with Nivolumab iat 3 mg/kg infusion over 60 minutes every 2 weeks for 1 year.. I reviewed with the patient today the rationale of proceeding with that drug versus potential side effects which include immune mediated pneumonitis, immune mediated colitis, immune mediated hepatitis, immune mediated nephritis with renal dysfunction immune mediated hypothyroidism or hyperthyroidism. We gave the patient handouts about side effects of the medication. The patient will review the information and call us back if he is interested to proceed with this approach. Patient is going to [facility] for second opinion next week. I will see the patient again when she comes back.    The patient is ready to learn, no apparent learning barriers were identified.  Diagnosis and treatment plans were explained to the patient. The patient expressed understanding of the content. The patient asked appropriate questions. The patient questions were answered to her satisfaction.  Time spent 40 minutes more than 50% of that time in counseling and coordination of care including discussion off natural history of melanoma, , management and adjuvant therapy. Potential side effects of immunotherapy  Chart documentation with Dragon Voice recognition Software. Although reviewed after completion, some words and grammatical errors may remain.    Again, thank you for allowing me to participate in the care of your patient.        Sincerely,        Kathy Richards MD

## 2017-11-02 NOTE — PATIENT INSTRUCTIONS
You will need to have an MRI of the brain this week if possible. Please keep your appointment at [facility] next week.      You will receive information today on Opdivo. We would like to see you back in clinic with Dr. Richards in 2 weeks with start of Opdivo.      Copy of appointments, and after visit summary (AVS) given to patient.  If you have any questions during business hours (M-F 8 AM- 4PM), please call Jeanie Keenan RN, BSN, OCN Oncology Hematology /Breast Cancer Navigator at [facility] [phone].       For questions after business hours, or on holidays/weekends, please call our after hours Nurse Triage line [phone]. Thank you.

## 2017-11-03 NOTE — PROGRESS NOTES
DATE OF VISIT: Nov 2, 2017    Doretha Fernandez is a 42 year old female is seen today for   Chief Complaint   Patient presents with     Oncology Clinic Visit     Post op follow up malignant melanoma. Review biopsy results.    .       (C79.9) Metastatic malignant melanoma (H)  (primary encounter diagnosis)  Patient is here today to review surgical pathology. She has axillary lymph node dissection. One lymph node out of 20 came back with metastatic melanoma. There was no extranodal extension. We reviewed management option at this time. Today we will arrange for MRI of the brain to continue with staging workup. Patient was referred to see radiation oncology for possible radiation therapy to the left axilla. We talked about adjuvant therapy. We talked about recent data of immunotherapy. We also talked about interferon as well. I would recommend use of immunotherapy with Nivolumab iat 3 mg/kg infusion over 60 minutes every 2 weeks for 1 year.. I reviewed with the patient today the rationale of proceeding with that drug versus potential side effects which include immune mediated pneumonitis, immune mediated colitis, immune mediated hepatitis, immune mediated nephritis with renal dysfunction immune mediated hypothyroidism or hyperthyroidism. We gave the patient handouts about side effects of the medication. The patient will review the information and call us back if he is interested to proceed with this approach. Patient is going to [facility] for second opinion next week. I will see the patient again when she comes back.    The patient is ready to learn, no apparent learning barriers were identified.  Diagnosis and treatment plans were explained to the patient. The patient expressed understanding of the content. The patient asked appropriate questions. The patient questions were answered to her satisfaction.  Time spent 40 minutes more than 50% of that time in counseling and coordination of care including discussion off  natural history of melanoma, , management and adjuvant therapy. Potential side effects of immunotherapy  Chart documentation with Dragon Voice recognition Software. Although reviewed after completion, some words and grammatical errors may remain.

## 2017-11-06 ENCOUNTER — HOSPITAL ENCOUNTER (OUTPATIENT)
Dept: MRI IMAGING | Facility: CLINIC | Age: 42
Discharge: HOME | End: 2017-11-06
Attending: INTERNAL MEDICINE | Admitting: INTERNAL MEDICINE
Payer: COMMERCIAL

## 2017-11-06 DIAGNOSIS — C43.9 METASTATIC MALIGNANT MELANOMA (H): ICD-10-CM

## 2017-11-06 PROCEDURE — 70553 MRI BRAIN STEM W/O & W/DYE: CPT

## 2017-11-06 PROCEDURE — A9585 GADOBUTROL INJECTION: HCPCS | Performed by: INTERNAL MEDICINE

## 2017-11-06 PROCEDURE — 25000128 H RX IP 250 OP 636: Performed by: INTERNAL MEDICINE

## 2017-11-06 RX ORDER — GADOBUTROL 604.72 MG/ML
9 INJECTION INTRAVENOUS ONCE
Status: COMPLETED | OUTPATIENT
Start: 2017-11-06 | End: 2017-11-06

## 2017-11-06 RX ADMIN — GADOBUTROL 9 ML: 604.72 INJECTION INTRAVENOUS at 08:35

## 2017-11-07 ENCOUNTER — TELEPHONE (OUTPATIENT)
Dept: FAMILY MEDICINE | Facility: CLINIC | Age: 42
End: 2017-11-07

## 2017-11-07 ENCOUNTER — TRANSFERRED RECORDS (OUTPATIENT)
Dept: HEALTH INFORMATION MANAGEMENT | Facility: CLINIC | Age: 42
End: 2017-11-07

## 2017-11-07 DIAGNOSIS — C43.62 MALIGNANT MELANOMA OF LEFT UPPER EXTREMITY INCLUDING SHOULDER (H): ICD-10-CM

## 2017-11-07 RX ORDER — OXYCODONE HYDROCHLORIDE 5 MG/1
5-10 TABLET ORAL EVERY 4 HOURS PRN
Qty: 20 TABLET | Refills: 0 | OUTPATIENT
Start: 2017-11-07

## 2017-11-07 NOTE — TELEPHONE ENCOUNTER
"Reason for Call:  Other prescription    Detailed comments: Oxycodone - Pt calling to ask if  Evangelista will refill her Oxycodone Rx.  I explained that  Evangelista is not in today and since she did not fill the Rx in the first place, she should call Dr. Klein's office, who originally filled the Percocet Rx.  Pt states that she does not want to call Dr. Klein's office because she does not want to go to [facility] to get Rx.  She then asked me to send request to Dr. MARLEEN Harris, \"She will fill it.\"  I responded that she too is out of the office.  I offered pt an appt with Dr. Rahman today and she declined stating that she has other appts and she asked that it be sent to anybody to get Rx refilled.    Oxycodone     Last Written Prescription Date:  10/23/17  Last Fill Quantity: 20,   # refills: 0  Future Office visit:    Next 5 appointments (look out 90 days)     Nov 16, 2017  9:30 AM CST   Return Visit with Kathy Richards MD   [facility] ([facility])    [facility]  [address]  [facility] [address]   [phone]            Jan 09, 2018 10:00 AM CST   Return Visit with Lowell Bullock MD   [facility] ([facility])    [address] [facility] [address] [address]   [phone]                 Routing refill request to provider for review/approval because:  Drug not on the [facility] or [facility] refill protocol or controlled substance      Phone Number Patient can be reached at: Home number on file [phone] (home)    Best Time: any    Can we leave a detailed message on this number? YES    Call taken on 11/7/2017 at 9:19 AM by Jena Quintero      "

## 2017-11-07 NOTE — PROGRESS NOTES
Results reviewed with patient.  Denies questions or concerns.  Will call back if any arise. Direct line provided.

## 2017-11-08 ENCOUNTER — TELEPHONE (OUTPATIENT)
Dept: ONCOLOGY | Facility: CLINIC | Age: 42
End: 2017-11-08

## 2017-11-08 NOTE — TELEPHONE ENCOUNTER
Patient states she saw surgeon at [facility] yesterday and they gave her an RX for oxycodone.  No further action required at this time.  Nohemy MOLINA RN

## 2017-11-08 NOTE — TELEPHONE ENCOUNTER
Oncology Distress Screening Follow-up  Clinical Social Work  [facility]    Identified Concern and Score From Distress Screening:   How concerned are you about feeling depressed or very sad?   7  6  10       4. How concerned are you about feeling anxious or very scared?   8  6  10       5. How                    Date of Distress Screening: 10/27 and 11/2    Data: Pt is 42 year old female with Metastatic malignant melanoma     Intervention: Call was placed to the pt and message left for the pt to call back to discuss her questions/concerns    Education Provided: none    Follow-up Required: Will await for the pt's return call      GEORGIE Parr, [facility]  Phone [phone]  Pager [phone]

## 2017-11-09 ENCOUNTER — OFFICE VISIT (OUTPATIENT)
Dept: ONCOLOGY | Facility: CLINIC | Age: 42
End: 2017-11-09
Attending: SURGERY
Payer: COMMERCIAL

## 2017-11-09 VITALS
HEIGHT: 63 IN | RESPIRATION RATE: 16 BRPM | SYSTOLIC BLOOD PRESSURE: 105 MMHG | WEIGHT: 208.9 LBS | BODY MASS INDEX: 37.02 KG/M2 | DIASTOLIC BLOOD PRESSURE: 75 MMHG | HEART RATE: 86 BPM | OXYGEN SATURATION: 97 %

## 2017-11-09 DIAGNOSIS — C43.9 METASTATIC MALIGNANT MELANOMA (H): Primary | ICD-10-CM

## 2017-11-09 PROCEDURE — 99214 OFFICE O/P EST MOD 30 MIN: CPT | Mod: ZF

## 2017-11-09 ASSESSMENT — PAIN SCALES - GENERAL: PAINLEVEL: MODERATE PAIN (4)

## 2017-11-09 NOTE — LETTER
11/9/2017       RE: Doretha Fernandez  [address]  [place] [address]     Dear Colleague,    Thank you for referring your patient, Doretha Fernandez, to the [facility] at [facility]. Please see a copy of my visit note below.    Doretha Fernandez is here for a postoperative visit.  Since I have seen her last, she has had decreased output from her drain.  She said she had about 60 mL per day for the last couple of days and then it stopped.  I am a little concerned that she may have a clot there.  I did milk the drain and cut the tubing back a little bit.  I am going to have her come back tomorrow.  If there is no further drain output, then I will remove the drain.  She is going to start immune therapy under the care of Dr. Richards.         Again, thank you for allowing me to participate in the care of your patient.      Sincerely,    Laurent Cool MD

## 2017-11-09 NOTE — NURSING NOTE
"Oncology Rooming Note    November 9, 2017 9:04 AM   Doretha Fernandez is a 42 year old female who presents for:    Chief Complaint   Patient presents with     Oncology Clinic Visit     Post op - left arm swelling     Initial Vitals: /75  Pulse 86  Resp 16  Ht 1.597 m (5' 2.87\")  Wt 94.8 kg (208 lb 14.4 oz)  LMP 11/02/2017 (Exact Date)  SpO2 97%  BMI 37.15 kg/m2 Estimated body mass index is 37.15 kg/(m^2) as calculated from the following:    Height as of this encounter: 1.597 m (5' 2.87\").    Weight as of this encounter: 94.8 kg (208 lb 14.4 oz). Body surface area is 2.05 meters squared.  Moderate Pain (4) Comment: left arm   Patient's last menstrual period was 11/02/2017 (exact date).  Allergies reviewed: Yes  Medications reviewed: Yes    Medications: Medication refills not needed today.  Pharmacy name entered into [employer]: [facility] [place], [place] - [address]    Clinical concerns: Post op visit. Left arm swelling. Losing hair. FMLA paperwork.     15 minutes for nursing intake (face to face time)     Darya Isidro LPN            "

## 2017-11-09 NOTE — PROGRESS NOTES
Doretha Fernandez is here for a postoperative visit.  Since I have seen her last, she has had decreased output from her drain.  She said she had about 60 mL per day for the last couple of days and then it stopped.  I am a little concerned that she may have a clot there.  I did milk the drain and cut the tubing back a little bit.  I am going to have her come back tomorrow.  If there is no further drain output, then I will remove the drain.  She is going to start immune therapy under the care of Dr. Richards.

## 2017-11-09 NOTE — MR AVS SNAPSHOT
After Visit Summary   11/9/2017    Doretha Fernandez    MRN: [id number]           Patient Information     Date Of Birth          1975        Visit Information        Provider Department      11/9/2017 9:00 AM Laurent Cool MD [facility]        Today's Diagnoses     Metastatic malignant melanoma (H)    -  1       Follow-ups after your visit        Your next 10 appointments already scheduled     Nov 13, 2017 11:30 AM CST   (Arrive by 11:15 AM)   Return Visit with Jessica Castellano PA-C   [facility] ([facility])    [address] [facility]  [address]nd [facility] [id number] [phone]            Nov 15, 2017  2:00 PM CST   (Arrive by 1:45 PM)   Return Visit with Bairon Miranda MD   [facility] ([facility])    [address] [place] [address]  3rd [facility] [id number] [phone]            Nov 16, 2017  9:00 AM CST   LAB with [facility] ([facility])    [address] [facility] [address]   [phone]           Please do not eat 10-12 hours before your appointment if you are coming in fasting for labs on lipids, cholesterol, or glucose (sugar). This does not apply to pregnant women. Water, hot tea and black coffee (with nothing added) are okay. Do not drink other fluids, diet soda or chew gum.            Nov 16, 2017  9:30 AM CST   Return Visit with Kathy Richards MD   [facility] ([facility])    [facility]  [address]  [facility] [address]   [phone]            Nov 16, 2017 10:00 AM CST   Level 2 with ROOM 5 [facility] ([facility])    [facility]  [address]  [facility] [address]   [phone]            Nov 30, 2017  9:30 AM CST   Level 2 with ROOM 3 [facility] ([facility])    [facility]  "Medical Ctr [facility]  [address]  [facility] [address]   [phone]            Dec 07, 2017  8:30 AM CST   New Visit with DELIA Borja   [facility] ([facility])    [address] [facility] [address]   [phone]            Jan 09, 2018 10:00 AM CST   Return Visit with Lowell Bullock MD   [facility] ([facility])    [address] [facility] [address]   [phone]              Who to contact     If you have questions or need follow up information about today's clinic visit or your schedule please contact [facility] directly at [phone].  Normal or non-critical lab and imaging results will be communicated to you by MyChart, letter or phone within 4 business days after the clinic has received the results. If you do not hear from us within 7 days, please contact the clinic through Nanoogohart or phone. If you have a critical or abnormal lab result, we will notify you by phone as soon as possible.  Submit refill requests through LeTV or call your pharmacy and they will forward the refill request to us. Please allow 3 business days for your refill to be completed.          Additional Information About Your Visit        MyChart Information     LeTV gives you secure access to your electronic health record. If you see a primary care provider, you can also send messages to your care team and make appointments. If you have questions, please call your primary care clinic.  If you do not have a primary care provider, please call [phone] and they will assist you.        Care EveryWhere ID     This is your Care EveryWhere ID. This could be used by other organizations to access your [place] medical records  [id number]        Your Vitals Were     Pulse Respirations Height Last Period Pulse Oximetry BMI (Body Mass Index)    86 16 1.597 m (5' 2.87\") 11/02/2017 (Exact " Date) 97% 37.15 kg/m2       Blood Pressure from Last 3 Encounters:   11/09/17 105/75   11/02/17 119/67   11/02/17 115/76    Weight from Last 3 Encounters:   11/09/17 94.8 kg (208 lb 14.4 oz)   11/02/17 96.1 kg (211 lb 12.8 oz)   11/02/17 95.8 kg (211 lb 4.8 oz)              Today, you had the following     No orders found for display         Today's Medication Changes          These changes are accurate as of: 11/9/17 11:59 PM.  If you have any questions, ask your nurse or doctor.               These medicines have changed or have updated prescriptions.        Dose/Directions    oxyCODONE IR 5 MG tablet   Commonly known as:  ROXICODONE   This may have changed:    - how much to take  - additional instructions   Used for:  Malignant melanoma of left upper extremity including shoulder (H)        Dose:  5-10 mg   Take 1-2 tablets (5-10 mg) by mouth every 4 hours as needed for moderate to severe pain .  Space out doses as able to wean yourself off of narcotic medication as soon as possible.   Quantity:  20 tablet   Refills:  0                Primary Care Provider Office Phone # Fax #    Anna Naidu [phone] [address] [facility] [address]        Equal Access to Services     BRIGETTE QAURLES AH: Hadii josy felicianoo Soalphonso, waaxda luqadaha, qaybta kaalmada adeegyada, pancho castillo. So [facility] [phone].    ATENCIÓN: Si habla español, tiene a mcdonnell disposición servicios gratuitos de asistencia lingüística. Llame al [phone].    We comply with applicable federal civil rights laws and [place] laws. We do not discriminate on the basis of race, color, national origin, age, disability, sex, sexual orientation, or gender identity.            Thank you!     Thank you for choosing [facility]  for your care. Our goal is always to provide you with excellent care. Hearing back from our patients is one way we can continue to improve our services. Please take  a few minutes to complete the written survey that you may receive in the mail after your visit with us. Thank you!             Your Updated Medication List - Protect others around you: Learn how to safely use, store and throw away your medicines at www.disposemymeds.org.          This list is accurate as of: 11/9/17 11:59 PM.  Always use your most recent med list.                   Brand Name Dispense Instructions for use Diagnosis    acetaminophen 325 MG tablet    TYLENOL    100 tablet    Take 2 tablets (650 mg) by mouth every 4 hours as needed for other (mild pain)    Malignant melanoma of left upper extremity including shoulder (H)       diazepam 10 MG tablet    VALIUM    1 tablet    Take 10 mg (1 tablet) by mouth 30-60 minutes before procedure for claustrophobia. Do not operate a vehicle after taking this medication.        FLUoxetine 20 MG capsule    PROzac     Take by mouth daily        LORazepam 1 MG tablet    ATIVAN    30 tablet    Take 1 tablet (1 mg) by mouth every 8 hours as needed for anxiety    Anxiety, Acute intractable tension-type headache       oxyCODONE IR 5 MG tablet    ROXICODONE    20 tablet    Take 1-2 tablets (5-10 mg) by mouth every 4 hours as needed for moderate to severe pain .  Space out doses as able to wean yourself off of narcotic medication as soon as possible.    Malignant melanoma of left upper extremity including shoulder (H)       senna-docusate 8.6-50 MG per tablet    SENOKOT-S;PERICOLACE    60 tablet    Take 1 tablet by mouth 2 times daily as needed for constipation    Malignant melanoma of left upper extremity including shoulder (H)       venlafaxine 37.5 MG 24 hr capsule    EFFEXOR-XR    46 capsule    Take 1 capsule daily for 14 days, then take 2 capsules daily.    Major depressive disorder, recurrent episode, moderate (H), SANDRA (generalized anxiety disorder)       Vitamin D3 3000 UNITS Tabs     30 tablet    Take 3,000 Int'l Units by mouth daily    Vitamin D deficiency        zonisamide 25 MG capsule    Zonegran    90 capsule    Take 1 tablet (25 mg) once daily for 1 week, then 2 tablets once daily for 1 week, then 3 tablets once daily for 1 week, then 4 tablets once daily for 1 week.    Headache disorder

## 2017-11-13 ENCOUNTER — OFFICE VISIT (OUTPATIENT)
Dept: SURGERY | Facility: CLINIC | Age: 42
End: 2017-11-13
Attending: PHYSICIAN ASSISTANT
Payer: COMMERCIAL

## 2017-11-13 ENCOUNTER — TELEPHONE (OUTPATIENT)
Dept: ONCOLOGY | Facility: CLINIC | Age: 42
End: 2017-11-13

## 2017-11-13 DIAGNOSIS — Z98.890 HISTORY OF LYMPH NODE DISSECTION OF LEFT AXILLA: ICD-10-CM

## 2017-11-13 DIAGNOSIS — I89.0 LYMPHEDEMA OF LEFT ARM: Primary | ICD-10-CM

## 2017-11-13 PROCEDURE — 99024 POSTOP FOLLOW-UP VISIT: CPT | Mod: ZP | Performed by: PHYSICIAN ASSISTANT

## 2017-11-13 PROCEDURE — 40000114 ZZH STATISTIC NO CHARGE CLINIC VISIT

## 2017-11-13 NOTE — LETTER
11/13/2017       RE: Doretha Fernandez  [address]  [place] [address]     Dear Colleague,    Thank you for referring your patient, Doretha Fernandez, to the [facility]. Please see a copy of my visit note below.    FOLLOW-UP  Nov 13, 2017    Doretha Fernandez is a 42 year old female who returns for a post-operative follow-up visit and drain removal.    HPI:    She underwent a radical left axillary lymph node dissection for metastatic melanoma on 10/23/17. She is here today for drain removal. Her drain output has been less than 20 mL per day for several days. She has discomfort from the drain. She denies fever. She has mild erythema around the drain. She reports left arm swelling since surgery.     LMP 11/02/2017 (Exact Date)   Physical Exam  Extremities: equal without swelling. Left axillary incision is well healed. Drain was removed. There is mild erythema around the drain likely secondary to irritation from the drain.     ASSESSMENT:    Doretha Fernandez is a 42 year old Doretha Fernandez is a 42 year old female s/p radical left axillary lymph node dissection for metastatic melanoma on 10/23/17. SHAY drain removed today.     PLAN:  1. Lymphedema referral placed.     Jessica Castellano PA-C    Again, thank you for allowing me to participate in the care of your patient.      Sincerely,    Jessica Castellano PA-C

## 2017-11-13 NOTE — PROGRESS NOTES
FOLLOW-UP  Nov 13, 2017    Doretha Fernandez is a 42 year old female who returns for a post-operative follow-up visit and drain removal.    HPI:    She underwent a radical left axillary lymph node dissection for metastatic melanoma on 10/23/17. She is here today for drain removal. Her drain output has been less than 20 mL per day for several days. She has discomfort from the drain. She denies fever. She has mild erythema around the drain. She reports left arm swelling since surgery.     LMP 11/02/2017 (Exact Date)   Physical Exam  Extremities: equal without swelling. Left axillary incision is well healed. Drain was removed. There is mild erythema around the drain likely secondary to irritation from the drain.     ASSESSMENT:    Doretha Fernandez is a 42 year old Doretha Fernandez is a 42 year old female s/p radical left axillary lymph node dissection for metastatic melanoma on 10/23/17. SHAY drain removed today.     PLAN:  1. Lymphedema referral placed.     Jessica Castellano PA-C

## 2017-11-13 NOTE — MR AVS SNAPSHOT
"              After Visit Summary   11/13/2017    Doretha Fernandez    MRN: [id number]           Patient Information     Date Of Birth          1975        Visit Information        Provider Department      11/13/2017 11:30 AM Jessica Castellano PA-C [facility]        Today's Diagnoses     Lymphedema of left arm    -  1    History of lymph node dissection of left axilla           Follow-ups after your visit        Additional Services     LYMPHEDEMA THERAPY REFERRAL       *This therapy referral will be filtered to a centralized scheduling office at [facility] and the patient will receive a call to schedule an appointment at a [place] location most convenient for them. *   If you have not heard from the scheduling office within 2 business days, please call [phone] for all locations, with the exception of [place], please call [phone].     Treatment: PT or OT Evaluation & Treatment  Special Instructions: lymphedema   PT/OT Treatment Diagnosis: Lymphedema    Please be aware that coverage of these services is subject to the terms and limitations of your health insurance plan.  Call member services at your health plan with any benefit or coverage questions.      **Note to Provider:  If you are referring outside of [place] for the therapy appointment, please list the name of the location in the \"special instructions\" above, print the referral and give to the patient to schedule the appointment.                  Your next 10 appointments already scheduled     Nov 15, 2017  2:00 PM CST   (Arrive by 1:45 PM)   Return Visit with Bairon Miranda MD   [facility] ([facility])    [address] [place] [address] [phone]            Nov 16, 2017  9:00 AM CST   LAB with [facility] ([facility])    [address] [place] [id number] [phone]           " Please do not eat 10-12 hours before your appointment if you are coming in fasting for labs on lipids, cholesterol, or glucose (sugar). This does not apply to pregnant women. Water, hot tea and black coffee (with nothing added) are okay. Do not drink other fluids, diet soda or chew gum.            Nov 16, 2017  9:30 AM CST   Return Visit with Kathy Richards MD   [facility] ([facility])    [facility]  [address]  [facility] [address]   [phone]            Nov 16, 2017 10:00 AM CST   Level 2 with ROOM 5 [facility] ([facility])    [facility]  [address]  [facility] [address]   [phone]            Nov 30, 2017  9:30 AM CST   Level 2 with ROOM 3 [facility] ([facility])    [facility]  [address]  [facility] [address]   [phone]            Dec 07, 2017  8:30 AM CST   New Visit with DELIA Borja   [facility] ([facility])    [address] [place] [id number] [phone]            Jan 09, 2018 10:00 AM CST   Return Visit with Lowell Bullock MD   [facility] ([facility])    [address] [facility] [address]   [phone]              Who to contact     If you have questions or need follow up information about today's clinic visit or your schedule please contact [facility] directly at [phone].  Normal or non-critical lab and imaging results will be communicated to you by MyChart, letter or phone within 4 business days after the clinic has received the results. If you do not hear from us within 7 days, please contact the clinic through MyChart or phone. If you have a critical or abnormal lab result, we will notify you by phone as soon as possible.  Submit refill requests through  iPrint or call your pharmacy and they will forward the refill request to us. Please allow 3 business days for your refill to be completed.          Additional Information About Your Visit        Stega Networkshart Information     iPrint gives you secure access to your electronic health record. If you see a primary care provider, you can also send messages to your care team and make appointments. If you have questions, please call your primary care clinic.  If you do not have a primary care provider, please call [phone] and they will assist you.        Care EveryWhere ID     This is your Care EveryWhere ID. This could be used by other organizations to access your [place] medical records  [id number]        Your Vitals Were     Last Period                   11/02/2017 (Exact Date)            Blood Pressure from Last 3 Encounters:   11/09/17 105/75   11/02/17 119/67   11/02/17 115/76    Weight from Last 3 Encounters:   11/09/17 94.8 kg (208 lb 14.4 oz)   11/02/17 96.1 kg (211 lb 12.8 oz)   11/02/17 95.8 kg (211 lb 4.8 oz)              We Performed the Following     LYMPHEDEMA THERAPY REFERRAL          Today's Medication Changes          These changes are accurate as of: 11/13/17 12:08 PM.  If you have any questions, ask your nurse or doctor.               These medicines have changed or have updated prescriptions.        Dose/Directions    oxyCODONE IR 5 MG tablet   Commonly known as:  ROXICODONE   This may have changed:    - how much to take  - additional instructions   Used for:  Malignant melanoma of left upper extremity including shoulder (H)        Dose:  5-10 mg   Take 1-2 tablets (5-10 mg) by mouth every 4 hours as needed for moderate to severe pain .  Space out doses as able to wean yourself off of narcotic medication as soon as possible.   Quantity:  20 tablet   Refills:  0                Primary Care Provider Office Phone # Fax #    Anna Naidu [phone] [address]  [place] [address]        Equal Access to Services     [facility] HospitalANNE : Hadii aad mariola richard Gustafson, waaxda luqadaha, qaybta kaalmada dagmarfredibessy, pancho idiin hayzeyadbeth schulzjennyfernando castillo. So [facility] [phone].    ATENCIÓN: Si habla español, tiene a mcdonnell disposición servicios gratuitos de asistencia lingüística. Diana al [phone].    We comply with applicable federal civil rights laws and [place] laws. We do not discriminate on the basis of race, color, national origin, age, disability, sex, sexual orientation, or gender identity.            Thank you!     Thank you for choosing [facility]  for your care. Our goal is always to provide you with excellent care. Hearing back from our patients is one way we can continue to improve our services. Please take a few minutes to complete the written survey that you may receive in the mail after your visit with us. Thank you!             Your Updated Medication List - Protect others around you: Learn how to safely use, store and throw away your medicines at www.disposemymeds.org.          This list is accurate as of: 11/13/17 12:08 PM.  Always use your most recent med list.                   Brand Name Dispense Instructions for use Diagnosis    acetaminophen 325 MG tablet    TYLENOL    100 tablet    Take 2 tablets (650 mg) by mouth every 4 hours as needed for other (mild pain)    Malignant melanoma of left upper extremity including shoulder (H)       diazepam 10 MG tablet    VALIUM    1 tablet    Take 10 mg (1 tablet) by mouth 30-60 minutes before procedure for claustrophobia. Do not operate a vehicle after taking this medication.        FLUoxetine 20 MG capsule    PROzac     Take by mouth daily        LORazepam 1 MG tablet    ATIVAN    30 tablet    Take 1 tablet (1 mg) by mouth every 8 hours as needed for anxiety    Anxiety, Acute intractable tension-type headache       oxyCODONE IR 5 MG tablet    ROXICODONE    20 tablet    Take 1-2 tablets (5-10 mg) by  mouth every 4 hours as needed for moderate to severe pain .  Space out doses as able to wean yourself off of narcotic medication as soon as possible.    Malignant melanoma of left upper extremity including shoulder (H)       senna-docusate 8.6-50 MG per tablet    SENOKOT-S;PERICOLACE    60 tablet    Take 1 tablet by mouth 2 times daily as needed for constipation    Malignant melanoma of left upper extremity including shoulder (H)       venlafaxine 37.5 MG 24 hr capsule    EFFEXOR-XR    46 capsule    Take 1 capsule daily for 14 days, then take 2 capsules daily.    Major depressive disorder, recurrent episode, moderate (H), SANDRA (generalized anxiety disorder)       Vitamin D3 3000 UNITS Tabs     30 tablet    Take 3,000 Int'l Units by mouth daily    Vitamin D deficiency       zonisamide 25 MG capsule    Zonegran    90 capsule    Take 1 tablet (25 mg) once daily for 1 week, then 2 tablets once daily for 1 week, then 3 tablets once daily for 1 week, then 4 tablets once daily for 1 week.    Headache disorder

## 2017-11-13 NOTE — TELEPHONE ENCOUNTER
Call from pt reporting her left axillary mono drain output has been less than 30 cc's/24 hours for several days. Pt had surgery 10/23- left axillary lymph node dissection. Pt will come to [facility] to have mono removed by KEITH Schultz.

## 2017-11-14 ENCOUNTER — HOSPITAL ENCOUNTER (INPATIENT)
Facility: CLINIC | Age: 42
LOS: 4 days | Discharge: HOME | DRG: 862 | End: 2017-11-18
Attending: EMERGENCY MEDICINE | Admitting: FAMILY MEDICINE
Payer: COMMERCIAL

## 2017-11-14 ENCOUNTER — TELEPHONE (OUTPATIENT)
Dept: ONCOLOGY | Facility: CLINIC | Age: 42
End: 2017-11-14

## 2017-11-14 ENCOUNTER — APPOINTMENT (OUTPATIENT)
Dept: ULTRASOUND IMAGING | Facility: CLINIC | Age: 42
DRG: 862 | End: 2017-11-14
Attending: EMERGENCY MEDICINE
Payer: COMMERCIAL

## 2017-11-14 DIAGNOSIS — L02.219 CELLULITIS AND ABSCESS OF TRUNK: ICD-10-CM

## 2017-11-14 DIAGNOSIS — L03.112 CELLULITIS OF LEFT AXILLA: ICD-10-CM

## 2017-11-14 DIAGNOSIS — L03.319 CELLULITIS AND ABSCESS OF TRUNK: ICD-10-CM

## 2017-11-14 DIAGNOSIS — F41.1 GAD (GENERALIZED ANXIETY DISORDER): ICD-10-CM

## 2017-11-14 DIAGNOSIS — L03.112 CELLULITIS OF LEFT AXILLA: Primary | ICD-10-CM

## 2017-11-14 DIAGNOSIS — F33.1 MAJOR DEPRESSIVE DISORDER, RECURRENT EPISODE, MODERATE (H): ICD-10-CM

## 2017-11-14 DIAGNOSIS — L02.412 ABSCESS OF LEFT AXILLA: ICD-10-CM

## 2017-11-14 DIAGNOSIS — F32.0 MILD MAJOR DEPRESSION (H): Primary | ICD-10-CM

## 2017-11-14 DIAGNOSIS — C96.9: ICD-10-CM

## 2017-11-14 DIAGNOSIS — L03.313 CELLULITIS OF CHEST WALL: ICD-10-CM

## 2017-11-14 DIAGNOSIS — Z98.890 HISTORY OF SURGERY: ICD-10-CM

## 2017-11-14 PROBLEM — L03.90 SEPSIS DUE TO CELLULITIS (H): Status: ACTIVE | Noted: 2017-11-14

## 2017-11-14 PROBLEM — A41.9 SEPSIS DUE TO CELLULITIS (H): Status: ACTIVE | Noted: 2017-11-14

## 2017-11-14 LAB
ALBUMIN SERPL-MCNC: 3.5 G/DL (ref 3.4–5)
ALP SERPL-CCNC: 75 U/L (ref 40–150)
ALT SERPL W P-5'-P-CCNC: 21 U/L (ref 0–50)
ANION GAP SERPL CALCULATED.3IONS-SCNC: 11 MMOL/L (ref 3–14)
AST SERPL W P-5'-P-CCNC: 10 U/L (ref 0–45)
BASOPHILS # BLD AUTO: 0 10E9/L (ref 0–0.2)
BASOPHILS NFR BLD AUTO: 0.2 %
BILIRUB SERPL-MCNC: 0.4 MG/DL (ref 0.2–1.3)
BUN SERPL-MCNC: 10 MG/DL (ref 7–30)
CALCIUM SERPL-MCNC: 8.8 MG/DL (ref 8.5–10.1)
CHLORIDE SERPL-SCNC: 103 MMOL/L (ref 94–109)
CO2 SERPL-SCNC: 21 MMOL/L (ref 20–32)
CREAT SERPL-MCNC: 0.75 MG/DL (ref 0.52–1.04)
DIFFERENTIAL METHOD BLD: ABNORMAL
EOSINOPHIL # BLD AUTO: 0 10E9/L (ref 0–0.7)
EOSINOPHIL NFR BLD AUTO: 0.2 %
ERYTHROCYTE [DISTWIDTH] IN BLOOD BY AUTOMATED COUNT: 12.6 % (ref 10–15)
GFR SERPL CREATININE-BSD FRML MDRD: 84 ML/MIN/1.7M2
GLUCOSE SERPL-MCNC: 120 MG/DL (ref 70–99)
HCT VFR BLD AUTO: 38.6 % (ref 35–47)
HGB BLD-MCNC: 12.9 G/DL (ref 11.7–15.7)
IMM GRANULOCYTES # BLD: 0.1 10E9/L (ref 0–0.4)
IMM GRANULOCYTES NFR BLD: 0.4 %
INR PPP: 0.97 (ref 0.86–1.14)
LACTATE BLD-SCNC: 1 MMOL/L (ref 0.7–2)
LYMPHOCYTES # BLD AUTO: 1.3 10E9/L (ref 0.8–5.3)
LYMPHOCYTES NFR BLD AUTO: 7.5 %
MCH RBC QN AUTO: 28.3 PG (ref 26.5–33)
MCHC RBC AUTO-ENTMCNC: 33.4 G/DL (ref 31.5–36.5)
MCV RBC AUTO: 85 FL (ref 78–100)
MONOCYTES # BLD AUTO: 1 10E9/L (ref 0–1.3)
MONOCYTES NFR BLD AUTO: 6.1 %
NEUTROPHILS # BLD AUTO: 14.5 10E9/L (ref 1.6–8.3)
NEUTROPHILS NFR BLD AUTO: 85.6 %
PLATELET # BLD AUTO: 319 10E9/L (ref 150–450)
POTASSIUM SERPL-SCNC: 3.6 MMOL/L (ref 3.4–5.3)
PROT SERPL-MCNC: 7.1 G/DL (ref 6.8–8.8)
RBC # BLD AUTO: 4.56 10E12/L (ref 3.8–5.2)
SODIUM SERPL-SCNC: 135 MMOL/L (ref 133–144)
WBC # BLD AUTO: 17 10E9/L (ref 4–11)

## 2017-11-14 PROCEDURE — 25000128 H RX IP 250 OP 636: Performed by: EMERGENCY MEDICINE

## 2017-11-14 PROCEDURE — S0077 INJECTION, CLINDAMYCIN PHOSP: HCPCS | Performed by: FAMILY MEDICINE

## 2017-11-14 PROCEDURE — 25000125 ZZHC RX 250

## 2017-11-14 PROCEDURE — 76882 US LMTD JT/FCL EVL NVASC XTR: CPT | Mod: LT

## 2017-11-14 PROCEDURE — 12000007 ZZH R&B INTERMEDIATE

## 2017-11-14 PROCEDURE — 36415 COLL VENOUS BLD VENIPUNCTURE: CPT | Performed by: EMERGENCY MEDICINE

## 2017-11-14 PROCEDURE — 83605 ASSAY OF LACTIC ACID: CPT | Performed by: EMERGENCY MEDICINE

## 2017-11-14 PROCEDURE — 96361 HYDRATE IV INFUSION ADD-ON: CPT | Performed by: EMERGENCY MEDICINE

## 2017-11-14 PROCEDURE — 99223 1ST HOSP IP/OBS HIGH 75: CPT | Mod: AI | Performed by: FAMILY MEDICINE

## 2017-11-14 PROCEDURE — 87186 SC STD MICRODIL/AGAR DIL: CPT | Performed by: EMERGENCY MEDICINE

## 2017-11-14 PROCEDURE — 87077 CULTURE AEROBIC IDENTIFY: CPT | Performed by: EMERGENCY MEDICINE

## 2017-11-14 PROCEDURE — 87040 BLOOD CULTURE FOR BACTERIA: CPT | Performed by: EMERGENCY MEDICINE

## 2017-11-14 PROCEDURE — 0J960ZX DRAINAGE OF CHEST SUBCUTANEOUS TISSUE AND FASCIA, OPEN APPROACH, DIAGNOSTIC: ICD-10-PCS | Performed by: EMERGENCY MEDICINE

## 2017-11-14 PROCEDURE — 96365 THER/PROPH/DIAG IV INF INIT: CPT | Performed by: EMERGENCY MEDICINE

## 2017-11-14 PROCEDURE — 87070 CULTURE OTHR SPECIMN AEROBIC: CPT | Performed by: EMERGENCY MEDICINE

## 2017-11-14 PROCEDURE — 10060 I&D ABSCESS SIMPLE/SINGLE: CPT | Performed by: EMERGENCY MEDICINE

## 2017-11-14 PROCEDURE — 25000125 ZZHC RX 250: Performed by: FAMILY MEDICINE

## 2017-11-14 PROCEDURE — 10060 I&D ABSCESS SIMPLE/SINGLE: CPT | Mod: Z6 | Performed by: EMERGENCY MEDICINE

## 2017-11-14 PROCEDURE — 96375 TX/PRO/DX INJ NEW DRUG ADDON: CPT | Performed by: EMERGENCY MEDICINE

## 2017-11-14 PROCEDURE — 85025 COMPLETE CBC W/AUTO DIFF WBC: CPT | Performed by: EMERGENCY MEDICINE

## 2017-11-14 PROCEDURE — 96376 TX/PRO/DX INJ SAME DRUG ADON: CPT | Performed by: EMERGENCY MEDICINE

## 2017-11-14 PROCEDURE — 85610 PROTHROMBIN TIME: CPT | Performed by: EMERGENCY MEDICINE

## 2017-11-14 PROCEDURE — 80053 COMPREHEN METABOLIC PANEL: CPT | Performed by: EMERGENCY MEDICINE

## 2017-11-14 PROCEDURE — 99285 EMERGENCY DEPT VISIT HI MDM: CPT | Mod: 25 | Performed by: EMERGENCY MEDICINE

## 2017-11-14 PROCEDURE — 99284 EMERGENCY DEPT VISIT MOD MDM: CPT | Mod: 25 | Performed by: EMERGENCY MEDICINE

## 2017-11-14 PROCEDURE — 96372 THER/PROPH/DIAG INJ SC/IM: CPT | Performed by: EMERGENCY MEDICINE

## 2017-11-14 PROCEDURE — 25000132 ZZH RX MED GY IP 250 OP 250 PS 637: Performed by: FAMILY MEDICINE

## 2017-11-14 PROCEDURE — 25000128 H RX IP 250 OP 636: Performed by: FAMILY MEDICINE

## 2017-11-14 RX ORDER — METOCLOPRAMIDE 10 MG/1
10 TABLET ORAL EVERY 6 HOURS PRN
Status: DISCONTINUED | OUTPATIENT
Start: 2017-11-14 | End: 2017-11-18 | Stop reason: HOSPADM

## 2017-11-14 RX ORDER — HYDROMORPHONE HYDROCHLORIDE 1 MG/ML
0.5 INJECTION, SOLUTION INTRAMUSCULAR; INTRAVENOUS; SUBCUTANEOUS
Status: COMPLETED | OUTPATIENT
Start: 2017-11-14 | End: 2017-11-14

## 2017-11-14 RX ORDER — METOCLOPRAMIDE HYDROCHLORIDE 5 MG/ML
10 INJECTION INTRAMUSCULAR; INTRAVENOUS EVERY 6 HOURS PRN
Status: DISCONTINUED | OUTPATIENT
Start: 2017-11-14 | End: 2017-11-18 | Stop reason: HOSPADM

## 2017-11-14 RX ORDER — SUMATRIPTAN 6 MG/.5ML
6 INJECTION, SOLUTION SUBCUTANEOUS ONCE
Status: COMPLETED | OUTPATIENT
Start: 2017-11-14 | End: 2017-11-14

## 2017-11-14 RX ORDER — CEPHALEXIN 500 MG/1
500 CAPSULE ORAL 3 TIMES DAILY
Qty: 30 CAPSULE | Refills: 0 | Status: ON HOLD | OUTPATIENT
Start: 2017-11-14 | End: 2017-11-18

## 2017-11-14 RX ORDER — NALOXONE HYDROCHLORIDE 0.4 MG/ML
.1-.4 INJECTION, SOLUTION INTRAMUSCULAR; INTRAVENOUS; SUBCUTANEOUS
Status: DISCONTINUED | OUTPATIENT
Start: 2017-11-14 | End: 2017-11-18 | Stop reason: HOSPADM

## 2017-11-14 RX ORDER — CEFAZOLIN SODIUM 2 G/100ML
2 INJECTION, SOLUTION INTRAVENOUS ONCE
Status: COMPLETED | OUTPATIENT
Start: 2017-11-14 | End: 2017-11-14

## 2017-11-14 RX ORDER — OXYCODONE HYDROCHLORIDE 15 MG/1
1-2 TABLET ORAL EVERY 4 HOURS PRN
COMMUNITY
Start: 2017-11-07 | End: 2017-11-24 | Stop reason: DRUGHIGH

## 2017-11-14 RX ORDER — OXYCODONE HYDROCHLORIDE 15 MG/1
15 TABLET ORAL EVERY 4 HOURS PRN
Status: DISCONTINUED | OUTPATIENT
Start: 2017-11-14 | End: 2017-11-18 | Stop reason: HOSPADM

## 2017-11-14 RX ORDER — POLYETHYLENE GLYCOL 3350 17 G/17G
17 POWDER, FOR SOLUTION ORAL DAILY PRN
Status: DISCONTINUED | OUTPATIENT
Start: 2017-11-14 | End: 2017-11-18 | Stop reason: HOSPADM

## 2017-11-14 RX ORDER — IOPAMIDOL 755 MG/ML
80 INJECTION, SOLUTION INTRAVASCULAR ONCE
Status: DISCONTINUED | OUTPATIENT
Start: 2017-11-14 | End: 2017-11-18 | Stop reason: HOSPADM

## 2017-11-14 RX ORDER — AMOXICILLIN 250 MG
1 CAPSULE ORAL 2 TIMES DAILY PRN
Status: DISCONTINUED | OUTPATIENT
Start: 2017-11-14 | End: 2017-11-18 | Stop reason: HOSPADM

## 2017-11-14 RX ORDER — HYDROMORPHONE HYDROCHLORIDE 1 MG/ML
.3-.5 INJECTION, SOLUTION INTRAMUSCULAR; INTRAVENOUS; SUBCUTANEOUS
Status: DISCONTINUED | OUTPATIENT
Start: 2017-11-14 | End: 2017-11-18 | Stop reason: HOSPADM

## 2017-11-14 RX ORDER — ONDANSETRON 2 MG/ML
4 INJECTION INTRAMUSCULAR; INTRAVENOUS EVERY 6 HOURS PRN
Status: DISCONTINUED | OUTPATIENT
Start: 2017-11-14 | End: 2017-11-18 | Stop reason: HOSPADM

## 2017-11-14 RX ORDER — ONDANSETRON 2 MG/ML
4 INJECTION INTRAMUSCULAR; INTRAVENOUS EVERY 30 MIN PRN
Status: DISCONTINUED | OUTPATIENT
Start: 2017-11-14 | End: 2017-11-14 | Stop reason: DRUGHIGH

## 2017-11-14 RX ORDER — PROCHLORPERAZINE MALEATE 10 MG
10 TABLET ORAL EVERY 6 HOURS PRN
Status: DISCONTINUED | OUTPATIENT
Start: 2017-11-14 | End: 2017-11-18 | Stop reason: HOSPADM

## 2017-11-14 RX ORDER — ACETAMINOPHEN 325 MG/1
650 TABLET ORAL EVERY 4 HOURS PRN
Status: DISCONTINUED | OUTPATIENT
Start: 2017-11-14 | End: 2017-11-18 | Stop reason: HOSPADM

## 2017-11-14 RX ORDER — PROCHLORPERAZINE 25 MG
25 SUPPOSITORY, RECTAL RECTAL EVERY 12 HOURS PRN
Status: DISCONTINUED | OUTPATIENT
Start: 2017-11-14 | End: 2017-11-18 | Stop reason: HOSPADM

## 2017-11-14 RX ORDER — ONDANSETRON 4 MG/1
4 TABLET, ORALLY DISINTEGRATING ORAL EVERY 6 HOURS PRN
Status: DISCONTINUED | OUTPATIENT
Start: 2017-11-14 | End: 2017-11-18 | Stop reason: HOSPADM

## 2017-11-14 RX ORDER — VENLAFAXINE HYDROCHLORIDE 75 MG/1
75 TABLET, EXTENDED RELEASE ORAL
Status: DISCONTINUED | OUTPATIENT
Start: 2017-11-14 | End: 2017-11-17

## 2017-11-14 RX ORDER — ASPIRIN 81 MG/1
81 TABLET ORAL DAILY
Status: DISCONTINUED | OUTPATIENT
Start: 2017-11-14 | End: 2017-11-18 | Stop reason: HOSPADM

## 2017-11-14 RX ORDER — SODIUM CHLORIDE 9 MG/ML
1000 INJECTION, SOLUTION INTRAVENOUS CONTINUOUS
Status: DISCONTINUED | OUTPATIENT
Start: 2017-11-14 | End: 2017-11-16

## 2017-11-14 RX ORDER — CLINDAMYCIN PHOSPHATE 900 MG/50ML
900 INJECTION, SOLUTION INTRAVENOUS EVERY 8 HOURS
Status: DISCONTINUED | OUTPATIENT
Start: 2017-11-14 | End: 2017-11-18 | Stop reason: HOSPADM

## 2017-11-14 RX ORDER — LORAZEPAM 1 MG/1
1 TABLET ORAL EVERY 4 HOURS PRN
Status: DISCONTINUED | OUTPATIENT
Start: 2017-11-14 | End: 2017-11-18 | Stop reason: HOSPADM

## 2017-11-14 RX ORDER — BUPIVACAINE HYDROCHLORIDE AND EPINEPHRINE 5; 5 MG/ML; UG/ML
INJECTION, SOLUTION PERINEURAL
Status: COMPLETED
Start: 2017-11-14 | End: 2017-11-14

## 2017-11-14 RX ADMIN — HYDROMORPHONE HYDROCHLORIDE 0.5 MG: 1 INJECTION, SOLUTION INTRAMUSCULAR; INTRAVENOUS; SUBCUTANEOUS at 20:22

## 2017-11-14 RX ADMIN — HYDROMORPHONE HYDROCHLORIDE 0.5 MG: 1 INJECTION, SOLUTION INTRAMUSCULAR; INTRAVENOUS; SUBCUTANEOUS at 22:31

## 2017-11-14 RX ADMIN — CLINDAMYCIN PHOSPHATE 900 MG: 18 INJECTION, SOLUTION INTRAVENOUS at 23:12

## 2017-11-14 RX ADMIN — SUMATRIPTAN SUCCINATE 6 MG: 6 INJECTION SUBCUTANEOUS at 21:37

## 2017-11-14 RX ADMIN — BUPIVACAINE HYDROCHLORIDE AND EPINEPHRINE BITARTRATE 50 ML: 5; .005 INJECTION, SOLUTION PERINEURAL at 21:36

## 2017-11-14 RX ADMIN — SODIUM CHLORIDE 1000 ML: 9 INJECTION, SOLUTION INTRAVENOUS at 23:13

## 2017-11-14 RX ADMIN — LORAZEPAM 1 MG: 1 TABLET ORAL at 23:08

## 2017-11-14 RX ADMIN — SODIUM CHLORIDE 1000 ML: 9 INJECTION, SOLUTION INTRAVENOUS at 19:33

## 2017-11-14 RX ADMIN — VANCOMYCIN HYDROCHLORIDE 1500 MG: 10 INJECTION, POWDER, LYOPHILIZED, FOR SOLUTION INTRAVENOUS at 21:30

## 2017-11-14 RX ADMIN — CEFAZOLIN SODIUM 2 G: 2 INJECTION, SOLUTION INTRAVENOUS at 19:39

## 2017-11-14 RX ADMIN — ONDANSETRON 4 MG: 2 INJECTION INTRAMUSCULAR; INTRAVENOUS at 19:29

## 2017-11-14 RX ADMIN — SODIUM CHLORIDE 1000 ML: 9 INJECTION, SOLUTION INTRAVENOUS at 21:58

## 2017-11-14 RX ADMIN — HYDROMORPHONE HYDROCHLORIDE 0.5 MG: 1 INJECTION, SOLUTION INTRAMUSCULAR; INTRAVENOUS; SUBCUTANEOUS at 19:33

## 2017-11-14 RX ADMIN — HYDROMORPHONE HYDROCHLORIDE 0.5 MG: 1 INJECTION, SOLUTION INTRAMUSCULAR; INTRAVENOUS; SUBCUTANEOUS at 21:33

## 2017-11-14 ASSESSMENT — ACTIVITIES OF DAILY LIVING (ADL)
TRANSFERRING: 2 - ASSISTIVE PERSON
RETIRED_EATING: 0-->INDEPENDENT
AMBULATION: 2 - ASSISTIVE PERSON
COMMUNICATION: 0 - UNDERSTANDS/COMMUNICATES WITHOUT DIFFICULTY
DRESS: 2 - ASSISTIVE PERSON
COGNITION: 0 - NO COGNITION ISSUES REPORTED
TRANSFERRING: 0-->INDEPENDENT
SWALLOWING: 0 - SWALLOWS FOODS/LIQUIDS WITHOUT DIFFICULTY
BATHING: 2 - ASSISTIVE PERSON
TOILETING: 2 - ASSISTIVE PERSON
FALL_HISTORY_WITHIN_LAST_SIX_MONTHS: NO
TOILETING: 0-->INDEPENDENT
WHICH_OF_THE_ABOVE_FUNCTIONAL_RISKS_HAD_A_RECENT_ONSET_OR_CHANGE?: AMBULATION;TRANSFERRING;TOILETING;BATHING;DRESSING
SWALLOWING: 0-->SWALLOWS FOODS/LIQUIDS WITHOUT DIFFICULTY
AMBULATION: 0-->INDEPENDENT
RETIRED_COMMUNICATION: 0-->UNDERSTANDS/COMMUNICATES WITHOUT DIFFICULTY
EATING: 0 - INDEPENDENT
BATHING: 0-->INDEPENDENT
DRESS: 0-->INDEPENDENT

## 2017-11-14 NOTE — IP AVS SNAPSHOT
MRN:[id number]                      After Visit Summary   11/14/2017    Doretha Fernandez    MRN: [id number]           Thank you!     Thank you for choosing [facility] for your care. Our goal is always to provide you with excellent care. Hearing back from our patients is one way we can continue to improve our services. Please take a few minutes to complete the written survey that you may receive in the mail after you visit with us. Thank you!        Patient Information     Date Of Birth          1975        About your hospital stay     You were admitted on:  November 14, 2017 You last received care in the:  [facility]    You were discharged on:  November 18, 2017       Who to Call     For medical emergencies, please call 911.  For non-urgent questions about your medical care, please call your primary care provider or clinic, [phone]          Attending Provider     Provider Specialty    Sunny Lane MD Emergency Medicine    Gomez Mejia MD Internal Medicine    Lowell Delatorre MD Family Practice       Primary Care Provider Office Phone # Fax #    Anna Naidu [phone]      Your next 10 appointments already scheduled     Nov 22, 2017 10:40 AM CST   Office Visit with Giuseppe Harris MD   [facility] ([facility])    [address] [id number] [phone]           Bring a current list of meds and any records pertaining to this visit. For Physicals, please bring immunization records and any forms needing to be filled out. Please arrive 10 minutes early to complete paperwork.            Nov 28, 2017  9:30 AM CST   Lymphedema Evaluation with Anna Graham, SPENCER   [facility]ema ([facility])    [address] [address]   [phone]            Nov 30, 2017  9:30 AM CST   Level 2 with ROOM 3 [facility] ([facility]  "Hospital)    [facility]  [address]  [facility] [address]   [phone]            Dec 07, 2017  8:30 AM CST   New Visit with DELIA Borja   [facility] ([facility])    [address] [address]   [phone]            Jan 09, 2018 10:00 AM CST   Return Visit with Lowell Bullock MD   [facility] ([facility])    [address]  [facility] [address]   [phone]              Further instructions from your care team       For the cellulitis:   Would continue the cephalexin but at 2 tabs 2 times/day for the next 10 days  Would  follow up with Dr Cool in the next week  Moist packs to the axilla 2-3 times/day   Oxycodone 5-15 mg every 4 hrs as needed for pain.  Try to decrease this dose as soon as you can.     For the Lymphedema   Would keep the arm moving  Would  follow up with Lymphedema clinic and they may work on the arm despite the infection.     For the anxiety/depression  -would increase the effexor to 150 mg daily      Would recheck in clinic in the next week on the other issues as well.      Pending Results     Date and Time Order Name Status Description    11/14/2017 1845 Blood culture Preliminary     11/14/2017 1845 Blood culture Preliminary             Statement of Approval     Ordered          11/18/17 0840  I have reviewed and agree with all the recommendations and orders detailed in this document.  EFFECTIVE NOW     Approved and electronically signed by:  Lowell Delatorre MD             Admission Information     Date & Time Provider Department Dept. Phone    11/14/2017 Lowell Delatorre MD [facility] Surgical [phone]      Your Vitals Were     Blood Pressure Pulse Temperature Respirations Height Weight    107/70 (BP Location: Right arm) 78 98.1  F (36.7  C) (Oral) 18 1.6 m (5' 3\") 95.9 kg (211 lb 6.7 oz)    Last Period Pulse " Oximetry BMI (Body Mass Index)             11/02/2017 (Exact Date) 94% 37.45 kg/m2         [facility] Information     [facility] gives you secure access to your electronic health record. If you see a primary care provider, you can also send messages to your care team and make appointments. If you have questions, please call your primary care clinic.  If you do not have a primary care provider, please call [phone] and they will assist you.        Care EveryWhere ID     This is your Care EveryWhere ID. This could be used by other organizations to access your [facility] medical records  [id number]        Equal Access to Services     [facility] : Nicol Gustafson, melba valencia, paulina perez, pancho moore . So [facility] [phone].    ATENCIÓN: Si habla español, tiene a mcdonnell disposición servicios gratuitos de asistencia lingüística. Llame al [phone].    We comply with applicable federal civil rights laws and [place] laws. We do not discriminate on the basis of race, color, national origin, age, disability, sex, sexual orientation, or gender identity.               Review of your medicines      START taking        Dose / Directions    venlafaxine 150 MG Tb24 24 hr tablet   Commonly known as:  EFFEXOR-ER   Used for:  Mild major depression (H)   Replaces:  venlafaxine 37.5 MG 24 hr capsule        Dose:  150 mg   Take 1 tablet (150 mg) by mouth daily (with breakfast)   Quantity:  30 each   Refills:  0         CONTINUE these medicines which may have CHANGED, or have new prescriptions. If we are uncertain of the size of tablets/capsules you have at home, strength may be listed as something that might have changed.        Dose / Directions    cephALEXin 500 MG capsule   Commonly known as:  KEFLEX   Indication:  Infection of the Skin and/or Related Soft Tissue   This may have changed:    - how much to take  - when to take this   Used for:  Cellulitis of chest wall         Dose:  1000 mg   Take 2 capsules (1,000 mg) by mouth 2 times daily   Quantity:  24 capsule   Refills:  0       * oxyCODONE IR 15 MG tablet   Commonly known as:  ROXICODONE   This may have changed:  Another medication with the same name was added. Make sure you understand how and when to take each.        Dose:  1-2 tablet   Take 1-2 tablets by mouth every 4 hours as needed   Refills:  0       * oxyCODONE IR 5 MG tablet   Commonly known as:  ROXICODONE   This may have changed:  You were already taking a medication with the same name, and this prescription was added. Make sure you understand how and when to take each.   Used for:  Cellulitis of chest wall        Dose:  5-15 mg   Take 1-3 tablets (5-15 mg) by mouth every 4 hours as needed for pain maximum 12 tablet(s) per day   Quantity:  40 tablet   Refills:  0       * Notice:  This list has 2 medication(s) that are the same as other medications prescribed for you. Read the directions carefully, and ask your doctor or other care provider to review them with you.      CONTINUE these medicines which have NOT CHANGED        Dose / Directions    acetaminophen 325 MG tablet   Commonly known as:  TYLENOL   Used for:  Malignant melanoma of left upper extremity including shoulder (H)        Dose:  650 mg   Take 2 tablets (650 mg) by mouth every 4 hours as needed for other (mild pain)   Quantity:  100 tablet   Refills:  0       aspirin 81 MG EC tablet        Dose:  81 mg   Take 81 mg by mouth daily   Refills:  0       LORazepam 1 MG tablet   Commonly known as:  ATIVAN   Used for:  Anxiety, Acute intractable tension-type headache        Dose:  1 mg   Take 1 tablet (1 mg) by mouth every 8 hours as needed for anxiety   Quantity:  30 tablet   Refills:  0       senna-docusate 8.6-50 MG per tablet   Commonly known as:  SENOKOT-S;PERICOLACE   Used for:  Malignant melanoma of left upper extremity including shoulder (H)        Dose:  1 tablet   Take 1 tablet by mouth 2 times daily  as needed for constipation   Quantity:  60 tablet   Refills:  1       Vitamin D3 3000 UNITS Tabs   Used for:  Vitamin D deficiency        Dose:  3000 Int'l Units   Take 3,000 Int'l Units by mouth daily   Quantity:  30 tablet   Refills:  3       zonisamide 25 MG capsule   Commonly known as:  Zonegran   Used for:  Headache disorder        Take 1 tablet (25 mg) once daily for 1 week, then 2 tablets once daily for 1 week, then 3 tablets once daily for 1 week, then 4 tablets once daily for 1 week.   Quantity:  90 capsule   Refills:  1         STOP taking     venlafaxine 37.5 MG 24 hr capsule   Commonly known as:  EFFEXOR-XR   Replaced by:  venlafaxine 150 MG Tb24 24 hr tablet                Where to get your medicines      These medications were sent to [facility] [place], [place] - [address]  [address] [address]     Phone:  [phone]     cephALEXin 500 MG capsule    venlafaxine 150 MG Tb24 24 hr tablet         Some of these will need a paper prescription and others can be bought over the counter. Ask your nurse if you have questions.     Bring a paper prescription for each of these medications     oxyCODONE IR 5 MG tablet               ANTIBIOTIC INSTRUCTION     You've Been Prescribed an Antibiotic - Now What?  Your healthcare team thinks that you or your loved one might have an infection. Some infections can be treated with antibiotics, which are powerful, life-saving drugs. Like all medications, antibiotics have side effects and should only be used when necessary. There are some important things you should know about your antibiotic treatment.      Your healthcare team may run tests before you start taking an antibiotic.    Your team may take samples (e.g., from your blood, urine or other areas) to run tests to look for bacteria. These test can be important to determine if you need an antibiotic at all and, if you do, which antibiotic will work best.      Within a few days,  your healthcare team might change or even stop your antibiotic.    Your team may start you on an antibiotic while they are working to find out what is making you sick.    Your team might change your antibiotic because test results show that a different antibiotic would be better to treat your infection.    In some cases, once your team has more information, they learn that you do not need an antibiotic at all. They may find out that you don't have an infection, or that the antibiotic you're taking won't work against your infection. For example, an infection caused by a virus can't be treated with antibiotics. Staying on an antibiotic when you don't need it is more likely to be harmful than helpful.      You may experience side effects from your antibiotic.    Like all medications, antibiotics have side effects. Some of these can be serious.    Let you healthcare team know if you have any known allergies when you are admitted to the hospital.    One significant side effect of nearly all antibiotics is the risk of severe and sometimes deadly diarrhea caused by Clostridium difficile (C. Difficile). This occurs when a person takes antibiotics because some good germs are destroyed. Antibiotic use allows C. diificile to take over, putting patients at high risk for this serious infection.    As a patient or caregiver, it is important to understand your or your loved one's antibiotic treatment. It is especially important for caregivers to speak up when patients can't speak for themselves. Here are some important questions to ask your healthcare team.    What infection is this antibiotic treating and how do you know I have that infection?    What side effects might occur from this antibiotic?    How long will I need to take this antibiotic?    Is it safe to take this antibiotic with other medications or supplements (e.g., vitamins) that I am taking?     Are there any special directions I need to know about taking this  antibiotic? For example, should I take it with food?    How will I be monitored to know whether my infection is responding to the antibiotic?    What tests may help to make sure the right antibiotic is prescribed for me?      Information provided by:  www.cdc.gov/getsmart  U.S. Department of Health and Human Services  Centers for disease Control and Prevention  National Center for Emerging and Zoonotic Infectious Diseases  Division of Healthcare Quality Promotion         Protect others around you: Learn how to safely use, store and throw away your medicines at www.disposemymeds.org.             Medication List: This is a list of all your medications and when to take them. Check marks below indicate your daily home schedule. Keep this list as a reference.      Medications           Morning Afternoon Evening Bedtime As Needed    acetaminophen 325 MG tablet   Commonly known as:  TYLENOL   Take 2 tablets (650 mg) by mouth every 4 hours as needed for other (mild pain)   Last time this was given:  650 mg on 11/17/2017  7:29 PM                                   aspirin 81 MG EC tablet   Take 81 mg by mouth daily   Last time this was given:  81 mg on 11/18/2017  8:49 AM                                   cephALEXin 500 MG capsule   Commonly known as:  KEFLEX   Take 2 capsules (1,000 mg) by mouth 2 times daily   Last time this was given:  1,000 mg on 11/18/2017  8:49 AM                                   LORazepam 1 MG tablet   Commonly known as:  ATIVAN   Take 1 tablet (1 mg) by mouth every 8 hours as needed for anxiety   Last time this was given:  1 mg on 11/17/2017  9:52 PM                                   * oxyCODONE IR 15 MG tablet   Commonly known as:  ROXICODONE   Take 1-2 tablets by mouth every 4 hours as needed   Last time this was given:  15 mg on 11/18/2017  8:49 AM                                * oxyCODONE IR 5 MG tablet   Commonly known as:  ROXICODONE   Take 1-3 tablets (5-15 mg) by mouth every 4 hours as  needed for pain maximum 12 tablet(s) per day   Last time this was given:  15 mg on 11/18/2017  8:49 AM                                   senna-docusate 8.6-50 MG per tablet   Commonly known as:  SENOKOT-S;PERICOLACE   Take 1 tablet by mouth 2 times daily as needed for constipation   Last time this was given:  1 tablet on 11/16/2017  5:37 AM                                   venlafaxine 150 MG Tb24 24 hr tablet   Commonly known as:  EFFEXOR-ER   Take 1 tablet (150 mg) by mouth daily (with breakfast)   Last time this was given:  150 mg on 11/18/2017  8:49 AM                                   Vitamin D3 3000 UNITS Tabs   Take 3,000 Int'l Units by mouth daily                                   zonisamide 25 MG capsule   Commonly known as:  Zonegran   Take 1 tablet (25 mg) once daily for 1 week, then 2 tablets once daily for 1 week, then 3 tablets once daily for 1 week, then 4 tablets once daily for 1 week.                                * Notice:  This list has 2 medication(s) that are the same as other medications prescribed for you. Read the directions carefully, and ask your doctor or other care provider to review them with you.

## 2017-11-14 NOTE — IP AVS SNAPSHOT
[facility]    [address] [address]    Phone:  [phone]    Fax:  [phone]                                       After Visit Summary   11/14/2017    Doretha Fernandez    MRN: [id number]           After Visit Summary Signature Page     I have received my discharge instructions, and my questions have been answered. I have discussed any challenges I see with this plan with the nurse or doctor.    ..........................................................................................................................................  Patient/Patient Representative Signature      ..........................................................................................................................................  Patient Representative Print Name and Relationship to Patient    ..................................................               ................................................  Date                                            Time    ..........................................................................................................................................  Reviewed by Signature/Title    ...................................................              ..............................................  Date                                                            Time

## 2017-11-14 NOTE — TELEPHONE ENCOUNTER
Follow-up with pt after new diagnosis of left axillary cellulits; pt began Keflex today. Message to Dr Richards to delay start of pt's immunotherapy due to start 11/16. Pt will see Dr Cool 11/16 to evaluate cellulitis and antibiotic response. Pt aware of appt with Dr Cool and delay of melanoma immunotherapy treatment.

## 2017-11-15 ENCOUNTER — TELEPHONE (OUTPATIENT)
Dept: NUTRITION | Facility: CLINIC | Age: 42
End: 2017-11-15

## 2017-11-15 LAB
ALBUMIN SERPL-MCNC: 2.9 G/DL (ref 3.4–5)
ALP SERPL-CCNC: 62 U/L (ref 40–150)
ALT SERPL W P-5'-P-CCNC: 17 U/L (ref 0–50)
ANION GAP SERPL CALCULATED.3IONS-SCNC: 8 MMOL/L (ref 3–14)
AST SERPL W P-5'-P-CCNC: 7 U/L (ref 0–45)
BILIRUB SERPL-MCNC: 0.2 MG/DL (ref 0.2–1.3)
BUN SERPL-MCNC: 8 MG/DL (ref 7–30)
CALCIUM SERPL-MCNC: 7.9 MG/DL (ref 8.5–10.1)
CHLORIDE SERPL-SCNC: 107 MMOL/L (ref 94–109)
CO2 SERPL-SCNC: 25 MMOL/L (ref 20–32)
CREAT SERPL-MCNC: 0.84 MG/DL (ref 0.52–1.04)
CREAT SERPL-MCNC: NORMAL MG/DL (ref 0.52–1.04)
ERYTHROCYTE [DISTWIDTH] IN BLOOD BY AUTOMATED COUNT: 12.6 % (ref 10–15)
GFR SERPL CREATININE-BSD FRML MDRD: 74 ML/MIN/1.7M2
GFR SERPL CREATININE-BSD FRML MDRD: NORMAL ML/MIN/1.7M2
GLUCOSE SERPL-MCNC: 89 MG/DL (ref 70–99)
HCT VFR BLD AUTO: 32.6 % (ref 35–47)
HGB BLD-MCNC: 10.3 G/DL (ref 11.7–15.7)
MCH RBC QN AUTO: 27.6 PG (ref 26.5–33)
MCHC RBC AUTO-ENTMCNC: 31.6 G/DL (ref 31.5–36.5)
MCV RBC AUTO: 87 FL (ref 78–100)
PLATELET # BLD AUTO: 317 10E9/L (ref 150–450)
POTASSIUM SERPL-SCNC: 3.7 MMOL/L (ref 3.4–5.3)
PROT SERPL-MCNC: 6.4 G/DL (ref 6.8–8.8)
RBC # BLD AUTO: 3.73 10E12/L (ref 3.8–5.2)
SODIUM SERPL-SCNC: 140 MMOL/L (ref 133–144)
WBC # BLD AUTO: 9.8 10E9/L (ref 4–11)

## 2017-11-15 PROCEDURE — 25000132 ZZH RX MED GY IP 250 OP 250 PS 637: Performed by: FAMILY MEDICINE

## 2017-11-15 PROCEDURE — 85027 COMPLETE CBC AUTOMATED: CPT | Performed by: FAMILY MEDICINE

## 2017-11-15 PROCEDURE — 25000128 H RX IP 250 OP 636: Performed by: EMERGENCY MEDICINE

## 2017-11-15 PROCEDURE — 12000007 ZZH R&B INTERMEDIATE

## 2017-11-15 PROCEDURE — 80053 COMPREHEN METABOLIC PANEL: CPT | Performed by: FAMILY MEDICINE

## 2017-11-15 PROCEDURE — 36415 COLL VENOUS BLD VENIPUNCTURE: CPT | Performed by: FAMILY MEDICINE

## 2017-11-15 PROCEDURE — 25000128 H RX IP 250 OP 636: Performed by: FAMILY MEDICINE

## 2017-11-15 PROCEDURE — S0077 INJECTION, CLINDAMYCIN PHOSP: HCPCS | Performed by: FAMILY MEDICINE

## 2017-11-15 PROCEDURE — 25000125 ZZHC RX 250: Performed by: FAMILY MEDICINE

## 2017-11-15 PROCEDURE — 99233 SBSQ HOSP IP/OBS HIGH 50: CPT | Performed by: INTERNAL MEDICINE

## 2017-11-15 RX ADMIN — HYDROMORPHONE HYDROCHLORIDE 0.5 MG: 1 INJECTION, SOLUTION INTRAMUSCULAR; INTRAVENOUS; SUBCUTANEOUS at 01:24

## 2017-11-15 RX ADMIN — LORAZEPAM 1 MG: 1 TABLET ORAL at 05:44

## 2017-11-15 RX ADMIN — SODIUM CHLORIDE 1000 ML: 9 INJECTION, SOLUTION INTRAVENOUS at 18:22

## 2017-11-15 RX ADMIN — HYDROMORPHONE HYDROCHLORIDE 0.5 MG: 1 INJECTION, SOLUTION INTRAMUSCULAR; INTRAVENOUS; SUBCUTANEOUS at 05:42

## 2017-11-15 RX ADMIN — VANCOMYCIN HYDROCHLORIDE 1500 MG: 10 INJECTION, POWDER, LYOPHILIZED, FOR SOLUTION INTRAVENOUS at 09:40

## 2017-11-15 RX ADMIN — VENLAFAXINE HYDROCHLORIDE 75 MG: 75 TABLET, EXTENDED RELEASE ORAL at 08:44

## 2017-11-15 RX ADMIN — ASPIRIN 81 MG: 81 TABLET, COATED ORAL at 08:44

## 2017-11-15 RX ADMIN — OXYCODONE HYDROCHLORIDE 15 MG: 15 TABLET ORAL at 13:42

## 2017-11-15 RX ADMIN — OXYCODONE HYDROCHLORIDE 15 MG: 15 TABLET ORAL at 09:17

## 2017-11-15 RX ADMIN — CLINDAMYCIN PHOSPHATE 900 MG: 18 INJECTION, SOLUTION INTRAVENOUS at 06:35

## 2017-11-15 RX ADMIN — SODIUM CHLORIDE 1000 ML: 9 INJECTION, SOLUTION INTRAVENOUS at 05:48

## 2017-11-15 RX ADMIN — CLINDAMYCIN PHOSPHATE 900 MG: 18 INJECTION, SOLUTION INTRAVENOUS at 15:05

## 2017-11-15 RX ADMIN — OXYCODONE HYDROCHLORIDE 15 MG: 15 TABLET ORAL at 19:17

## 2017-11-15 RX ADMIN — VANCOMYCIN HYDROCHLORIDE 1500 MG: 10 INJECTION, POWDER, LYOPHILIZED, FOR SOLUTION INTRAVENOUS at 21:36

## 2017-11-15 NOTE — H&P
CHIEF COMPLAINT:  Fever with redness and swelling in the left axilla.      HISTORY OF PRESENT ILLNESS:  Doretha Fernandez had discovered a lymph node in her left axilla, and subsequently underwent a lymph node dissection 3 weeks ago resulting in 1 of 20 lymph nodes positive for what appears to be metastatic melanoma.  Despite extensive searches by 2 different dermatologists, no clear dermatologic source was found.      She had a Augustus-El drain in for 3 weeks with some problems with it plugging and needing replacement, but then over the last 4 days she only had 20 mL out per day, so they pulled the Augustus-El yesterday morning.  By yesterday evening she started noticing some redness and increased swelling around the site of the SHAY drain puncture, and then this morning noted marked increased swelling, redness and pain.  She sent a picture in to her surgeon who started her on Keflex over-the-counter, but she vomited that up and so came in.      She noted fever up to 101.2 at home.      On admission, she was quite frantic, anxious, in marked distress.  Pain was 10/10 and much worse than anything after surgery.  Pain was relatively well controlled with Dilaudid.  She had been taking 15 oxycodone q.4 h.  She was given 1 dose of Keflex, but there appeared to be some increase in the expansion of the redness down further onto the breast even in the 3 hours she was in the ED.  Therefore, ultrasound was done, showing no evidence of gas but a 1 x 2 cm fluid collection which is apparently right under the SHAY drain site.  There was a drop of drainage from that site just after the ultrasound.  In the ED, Xylocaine was infiltrated, and the SHAY drain site was opened by ER physician.  She is admitted for IV antibiotics and further observation.      PAST MEDICAL HISTORY:   1.  Recent diagnosis of melanoma in a single lymph node with unknown source.   2.  Mild major depression.   3.  Generalized anxiety disorder with panic attacks,  increasing in frequency over the last 4 weeks since her cancer diagnosis.   4.  GERD.   5.  Mild intermittent asthma.   6.  Prothrombin mutation, with a history of a TIA in the distant past, currently controlled with daily aspirin.   7.  Migraine headaches.      FAMILY HISTORY:  Mother had lung cancer at 45 and .  Father had depression, hyperlipidemia.  A sister had depression.  Maternal grandmother had diabetes, depression.  Maternal grandfather, diabetes, heart disease.  Daughter has asthma.      SOCIAL HISTORY:  She has 2 children at home.  Nonsmoker, but passive exposure.  No alcohol use.      REVIEW OF SYSTEMS:  She has had nausea and vomiting since this morning, vomited just prior to admission, anorexia, now has a headache which she describes as a migraine which she has not had for several months.  She has had chills, fever as noted at home, some diffuse diaphoresis, general malaise, weakness and has been severely anxious with all of this.  No bowel or bladder symptoms.  The  rest of the 10-point review of systems was negative.      PHYSICAL EXAMINATION:   GENERAL:  She is quite anxious, teary, but much calmer now than she was initially in the ED.     VITAL SIGNS:  She has been afebrile.  Pulse up to 115.  Blood pressure 117/84 down to 83/55. O2 sat 96% on room air.   HEENT:  Eyes show pupils equal and reactive, EOMs intact.  TMs normal.  Nasal mucosa normal.  Throat is normal.   NECK:  Supple, without masses, nodes or thyromegaly.   CHEST:  Clear to A and P.   CARDIOVASCULAR:  Regular rate and rhythm, without murmur, click or rub.  Pulses are brisk and equal.  No JVD, HJR.  No edema.   ABDOMEN:  Obese, soft, nontender, without HSM or masses.   SKIN:  Shows significant redness and some swelling starting at the incision line of the axilla, going just 0.5 cm above it and then extensively down below that onto the left breast.  Extension was marked at 9:00 p.m.  There is a tiny amount of fluid coming from the  Augustus-El drain site.  There is tenderness but no crepitus.  The arm has no swelling, though there is some tenderness to palpation about areas that are not erythematous in the upper arm and behind into the soft tissues of the back.   NEUROLOGIC:  Shows good strength and sensation in the upper extremities, strength and sensation in the lower extremities.  She does not want to move the left arm because of the marked pain.      Ultrasound was done, and I discussed the findings with Radiology.  There was no free air.  There was a small, 1 x 2 cm, elongated fluid collection directly under the skin, and we had this marked at the SHAY site.  The ER physician went back in and opened that up.      LABORATORY:  White count 17,000.  Chemistries all within normal limits.  Lactic acid 1.0.      ASSESSMENT:     1.  Postoperative wound infection, left axilla, with small abscess formation at a Augustus-El drain site.  She was initially given cephalexin, but because of the apparent progression even while she was in the Emergency Department, we expanded this to vancomycin and clindamycin.  The wound site was cultured.  Blood cultures were obtained.  She was given 2 liters intravenous fluids in the Emergency Department, and will continue lactated Ringer at 125 per hour.  The wound area was marked, and I expect this will likely progress some over the next 18 hours, but would like to see the white count and fever coming down as well as some of her pain complaints.   2.  Sepsis secondary to above cellulitis.  She met sepsis criteria, and because of some concern for underlying fasciitis, will need close observation.  Her pain was out of proportion to the findings, but may also have been because of her underlying anxiety disorder.   3.  Generalized anxiety with panic attacks.  I believe this is playing a role in her symptom descriptions, but will need to follow closely.  We will use Ativan q.4 h. p.r.n. plus her underlying Effexor.    4.  A malignant melanoma.  She was to start chemotherapy with immunologics this week, but will need to be postponed while there is active infection.   5.  Mild intermittent asthma.  No symptoms at this time.  p.r.n. albuterol ordered.   6.  Prothrombin mutation with a history of transient ischemic attack.  She is on current aspirin.   7.  Migraine headaches with current exacerbation.  I did give her Imitrex 6 mg subQ in the Emergency Department.  This migraine came on despite fairly heavy dose of Dilaudid and oxycodone.   8.  CODE STATUS:  Full.   9.  Prophylaxis:  Mechanical, since she may need further surgical revision.      DISPOSITION:  She is admitted for IV antibiotics and to follow progression.  I would recommend the treating team to contact her surgical team, Dr. Cool's office the  with her progress in a.m.         LIN PIZANO MD             D: 2017 21:49   T: 2017 22:25   MT:       Name:     ELIZABETH MOREIRA   MRN:      [id number]        Account:      [id number]   :      1975           Admitted:     [id number]      Document: [id number]

## 2017-11-15 NOTE — PLAN OF CARE
Problem: Patient Care Overview  Goal: Plan of Care/Patient Progress Review  Outcome: Improving  Redness in left axilla area remains within markings, small wound with scant drainage is covered with ABD.  Pt has been afebrile.  Taking oxycodone 15mg every 4 hours for pain.  Receiving IV antibiotics, will continue to monitor.

## 2017-11-15 NOTE — PROGRESS NOTES
"[place] [facility] ADMISSION NOTE    Patient admitted to room 2310 at approximately 2209 via cart from emergency room. Patient was accompanied by transport tech and father, Сергей.     Verbal SBAR report received from Francheska MARR prior to patient arrival.     Patient ambulated to bathroom then bed with stand-by assist. Patient alert and oriented X 3. Pain is not well controlled.  Medication(s) being used: narcotic analgesics including dilaudid. 0-10 Pain Scale: 8. Admission vital signs: Blood pressure 120/62, pulse 82, temperature 98.1  F (36.7  C), temperature source Oral, resp. rate 18, height 1.6 m (5' 3\"), weight 95.9 kg (211 lb 6.7 oz), last menstrual period 11/02/2017, SpO2 97 %, not currently breastfeeding. Patient was oriented to plan of care, call light, bed controls, tv, telephone, bathroom and visiting hours.     The following safety risks were identified during admission: none. Yellow risk band applied: NO.     Lissett Tran    "

## 2017-11-15 NOTE — ED NOTES
Patient states had left axillary dissection with melanoma on lymph node removed 3 weeks ago, SHAY drain removed yesterday, now site red hot swollen and painful. Patient states contacted PCP gave Keflex, Patient started taking it today and states after taking it at 1700 got sick at 1730 with vomiting. Patient states just feels like she is getting sicker and sicker. Patient friend in room with patient.

## 2017-11-15 NOTE — PROGRESS NOTES
[facility] Internal Medicine Progress Note     Date of Service (when I saw the patient): 11/15/2017    REASON FOR ADMISSION / INTERVAL HISTORY:  Fever with redness and swelling in the left axilla. Still has lot of pain. See details below.    US LUE 11/14-FINDINGS: Ultrasound in the area of patient's cellulitis left axillary  region is performed. A small complex appearing fluid collection is  noted in the area of patient's cellulitis concerning for abscess  measuring 2.0 x 0.8 x 1.0 cm.    ASSESSMENT/PLAN:   L AXILLA WOUND INFECTION WITH ABSCESS  Pt s/p LN dissection surgery 3 weeks ago. Left with SHAY drain until 11/13--output was apparently 20ml/day until it was removed. --drain removed  11/13 and pt sent on po antbx. Pt took just 1 dose --erethema/ pain worsened and pt came in to ED. US L UE as above. 1 cc pus was drained in ED. Wound / blood cx sent and pt started on vanc/ clinda. Had leucocytosis.  WBC improved, afebrile since admission. All cx -p. Continues to have pain.  -continue current antbx. If spikes temp or pain persists, may need surgery consult / repeat US for more through debridement.    SEPSIS  2ndry to above  -rx as above    GENERALIZED ANXIETY WITH PANIC ATTACKS  Stable  -Ativan q.4 h. p.r.n. plus her underlying Effexor.     MALIGNANT MELANOMA  Dx in LN on bx 3 weeks ago. Pt has a h/o  Basal cell CA L upper eyelid in '12. Currently no lesions were identified for melanoma on skin. She was to start chemotherapy with immunologics this week, but will need to be postponed while there is active infection    PROTHROMBIN MUTATION WITH H/O TIA  Continue aspirin    MILD INTERMITTENT ASTHMA  No symptoms at this time.  p.r.n. albuterol ordered    MIGRAINES  Got Imitrex 6 mg subQ in the Emergency Department.  This migraine came on despite fairly heavy dose of Dilaudid and oxycodone  Stable.     DISPO  Needs IV antbx 2-3 days.      JOSE ANGEL VASQUEZ MD   Pg [phone]    DVT Prhylaxis: Low Risk/Ambulatory with  "no VTE prophylaxis indicated  Code Status: Full Code    ROS:  As described in A/P and Exam.  Otherwise ALL are  negative.    PHYSICAL EXAM:  All vitals have been reviewed    Blood pressure 112/68, pulse 89, temperature 98.7  F (37.1  C), temperature source Oral, resp. rate 16, height 1.6 m (5' 3\"), weight 95.9 kg (211 lb 6.7 oz), last menstrual period 11/02/2017, SpO2 94 %, not currently breastfeeding.    I/O this shift:  In: 200 [P.O.:200]  Out: 600 [Urine:600]    GENERAL APPEARANCE: obese, alert and no distress  EYES: conjunctiva clear, eyes grossly normal  HENT: external ears and nose normal   NECK: supple, no masses or adenopathy  RESP: lungs clear to auscultation - no rales, rhonchi or wheezes  CV: regular rate and rhythm, normal S1 S2, no S3 or S4 and no murmur, click or rub   ABDOMEN: soft, nontender, no HSM or masses and bowel sounds normal  MS: no clubbing, cyanosis; no edema  SKIN: erethema L axilla, L lat breast, L chest wall  NEURO: -non-focal moves all 4 extr    ROUTINE  LABS (Last four results)  CMP  Recent Labs  Lab 11/15/17  0630 11/14/17  1859    135   POTASSIUM 3.7 3.6   CHLORIDE 107 103   CO2 25 21   ANIONGAP 8 11   GLC 89 120*   BUN 8 10   CR Canceled, Test credited  0.84 0.75   GFRESTIMATED Canceled, Test credited  74 84   GFRESTBLACK Canceled, Test credited  >90 >90   PATRICIA 7.9* 8.8   PROTTOTAL 6.4* 7.1   ALBUMIN 2.9* 3.5   BILITOTAL 0.2 0.4   ALKPHOS 62 75   AST 7 10   ALT 17 21     CBC  Recent Labs  Lab 11/15/17  0630 11/14/17  1859   WBC 9.8 17.0*   RBC 3.73* 4.56   HGB 10.3* 12.9   HCT 32.6* 38.6   MCV 87 85   MCH 27.6 28.3   MCHC 31.6 33.4   RDW 12.6 12.6    319     INR  Recent Labs  Lab 11/14/17  1916   INR 0.97     Arterial Blood GasNo lab results found in last 7 days.    Recent Results (from the past 24 hour(s))   US Extremity Non Vascular Left    Narrative    US EXTREMITY NON VASCULAR LEFT 11/14/2017 8:55 PM     HISTORY: Left axillary cellulitis with concerns for abscess. "      FINDINGS: Ultrasound in the area of patient's cellulitis left axillary  region is performed. A small complex appearing fluid collection is  noted in the area of patient's cellulitis concerning for abscess  measuring 2.0 x 0.8 x 1.0 cm.      Impression    IMPRESSION: Small abscess left axilla in area of patient's cellulitis.    ALEX RIVERA MD

## 2017-11-15 NOTE — PHARMACY-VANCOMYCIN DOSING SERVICE
Pharmacy Vancomycin Initial Note  Date of Service 2017  Patient's  1975  42 year old, female    Indication: Skin and Soft Tissue Infection    Current estimated CrCl = Estimated Creatinine Clearance: 107.5 mL/min (based on Cr of 0.75).    Creatinine for last 3 days  2017:  6:59 PM Creatinine 0.75 mg/dL    Recent Vancomycin Level(s) for last 3 days  No results found for requested labs within last 72 hours.      Vancomycin IV Administrations (past 72 hours)      No vancomycin orders with administrations in past 72 hours.                Nephrotoxins and other renal medications (Future)    Start     Dose/Rate Route Frequency Ordered Stop    17  vancomycin (VANCOCIN) 1,500 mg in NaCl 0.9 % 250 mL intermittent infusion      1,500 mg  over 90 Minutes Intravenous EVERY 12 HOURS 17            Contrast Orders - past 72 hours     None                Plan:  1.  Start vancomycin  1500 mg IV q12h.   2.  Goal Trough Level: 10-15 mg/L   3.  Pharmacy will check trough levels as appropriate in 1-3 Days.    4. Serum creatinine levels will be ordered daily for the first week of therapy and at least twice weekly for subsequent weeks.    5. Aurora method utilized to dose vancomycin therapy: Method 2    Nicola Mckeon

## 2017-11-15 NOTE — PLAN OF CARE
Problem: Pain, Acute (Adult)  Goal: Acceptable Pain Control/Comfort Level  Patient will demonstrate the desired outcomes by discharge/transition of care.   Outcome: Improving  Pt slept a little tonight since admitted. Received dilaudid 0.5mg 3x since admit and Ativan 1mg x2. Pt reports some relief from meds. IV antibiotics Vanco and Cleocin administered as ordered. Swelling under L arm looks about the same as upon arrival, but skin color in the affected area appears to be less bright red. Pillow provided under L upper arm and breast for support. BP has been being measured on R forearm. Pt states the nausea she had in the ED has resolved.

## 2017-11-15 NOTE — TELEPHONE ENCOUNTER
Patient was contacted due to a positive screen on the Oncology Distress Screening tool. Spoke with the patient while she was hospitalized regarding her concerns about ability to eat while receiving cancer treatments. Registered Dietitian contact information provided to patient if further questions arise.    Cleo Calderon RD,LD  Clinical Dietitian

## 2017-11-15 NOTE — ED PROVIDER NOTES
History     Chief Complaint   Patient presents with     Post-op Problem     Pt had melnoma removed and lymph node on LUE with SHAY drain placement and now has had increased pain, swelling, and tenderness.        HPI  Doretha Fernandez is a 42 year old female who presents with complaints of left axillary and chest pain which began yesterday and have progressively worsened.  Patient had a recent left axillary node dissection for melanoma in her lymph nodes.  They have not found a primary skin lesion.  She had a SHAY drain for 3 weeks and that was removed yesterday.  She noted yesterday after removal that there is increased redness in the area and that has progressed not involving the axilla and left breast.  Patient feels mildly ill.  She did have nausea and vomiting.  Denies lightheadedness or generalized weakness.  No chest pain or shortness of breath.  Denies abdominal pain and is currently not nauseated.  She's had no diarrhea or dysuria.  She took a photo of the area and consented to her surgeon/ primary care and they called in Keflex for her.  Fortunately vomited after taking the dose has not had any antibiotics.  Patient's tetanus is up-to-date.    Problem List:    Patient Active Problem List    Diagnosis Date Noted     Melanoma (H) 10/23/2017     Priority: Medium     Malignant melanoma of left upper extremity including shoulder (H) 10/12/2017     Priority: Medium     Metastatic malignant melanoma (H) 10/10/2017     Priority: Medium     Prothrombin mutation (H) 04/05/2017     Priority: Medium     On daily aspirin 81 mg per hematology's recommendations from 2008       Vision changes 04/01/2017     Priority: Medium     Acute non intractable tension-type headache 04/01/2017     Priority: Medium     Mild intermittent asthma without complication 11/08/2013     Priority: Medium     Mild major depression (H) 06/24/2013     Priority: Medium     Anxiety state 09/13/2012     Priority: Medium     Problem list name updated by  automated process. Provider to review       Esophageal reflux 2012     Priority: Medium     DUB (dysfunctional uterine bleeding) 2011     Priority: Medium     CARDIOVASCULAR SCREENING; LDL GOAL LESS THAN 160 2011     Priority: Low        Past Medical History:    Past Medical History:   Diagnosis Date     Abnormal MRI      Anxiety      Depression      Lumbago      Malignant melanoma (H)      Mild persistent asthma      Prothrombin deficiency (H)      Stroke (cerebrum) (H)      TIA (transient ischemic attack)        Past Surgical History:    Past Surgical History:   Procedure Laterality Date     COLONOSCOPY N/A 10/18/2017    Procedure: COLONOSCOPY;  Colon;  Surgeon: Debbie Stephens MD;  Location: [facility] OR     DISSECT LYMPH NODE AXILLA Left 10/23/2017    Procedure: DISSECT LYMPH NODE AXILLA;  Left Axillary Lymph Node Dissection ;  Surgeon: Laurent Cool MD;  Location: [facility] OR     GYN SURGERY           REPAIR MOHS Left 2017    Procedure: REPAIR MOHS;  Left Upper Lid Moh's Reconstruction;  Surgeon: Kisha Bosch MD;  Location: [facility] OR       Family History:    Family History   Problem Relation Age of Onset     CANCER Mother 45     lung     Neurologic Disorder Mother      Lipids Father      GASTROINTESTINAL DISEASE Father      Depression Father      CANCER Maternal Grandmother      Blood Disease Maternal Grandmother      Arthritis Maternal Grandmother      DIABETES Maternal Grandmother      Depression Maternal Grandmother      Macular Degeneration Maternal Grandmother      Glaucoma Maternal Grandmother      DIABETES Maternal Grandfather      CEREBROVASCULAR DISEASE Maternal Grandfather      Blood Disease Maternal Grandfather      HEART DISEASE Maternal Grandfather      Glaucoma Maternal Grandfather      CANCER Paternal Grandmother      Cancer - colorectal Paternal Grandmother      Respiratory Paternal Grandfather      Blood Disease Paternal Grandfather      HEART DISEASE Daughter       "Asthma Daughter      Depression Sister        Social History:  Marital Status:   [4]  Social History   Substance Use Topics     Smoking status: Passive Smoke Exposure - Never Smoker     Last attempt to quit: 3/20/1998     Smokeless tobacco: Never Used     Alcohol use No      Comment: occ        Medications:      cephALEXin (KEFLEX) 500 MG capsule   oxyCODONE IR (ROXICODONE) 15 MG tablet   aspirin 81 MG EC tablet   acetaminophen (TYLENOL) 325 MG tablet   senna-docusate (SENOKOT-S;PERICOLACE) 8.6-50 MG per tablet   LORazepam (ATIVAN) 1 MG tablet   venlafaxine (EFFEXOR-XR) 37.5 MG 24 hr capsule   Cholecalciferol (VITAMIN D3) 3000 UNITS TABS   zonisamide (ZONEGRAN) 25 MG capsule         Review of Systems all other systems reviewed and are negative.    Physical Exam   BP: 117/84  Pulse: 101  Temp: 98.6  F (37  C)  Resp: 24  Height: 160 cm (5' 3\")  Weight: 93.9 kg (207 lb)  SpO2: 98 %      Physical Exam Gen. alert cooperative female in moderate distress.  Patient is diaphoretic.  HEENT shows facial pallor.  Speech is clear and concise.  Neck is supple.  Lungs are clear without adventitious sounds.  Cardiac regular rate which is borderline tachycardic.             ED Course     ED Course     Procedures  Results for orders placed or performed during the hospital encounter of 11/14/17   US Extremity Non Vascular Left    Narrative    US EXTREMITY NON VASCULAR LEFT 11/14/2017 8:55 PM     HISTORY: Left axillary cellulitis with concerns for abscess.      FINDINGS: Ultrasound in the area of patient's cellulitis left axillary  region is performed. A small complex appearing fluid collection is  noted in the area of patient's cellulitis concerning for abscess  measuring 2.0 x 0.8 x 1.0 cm.      Impression    IMPRESSION: Small abscess left axilla in area of patient's cellulitis.    ALEX RIVERA MD              An ultrasound was obtained and did show a small fluid collection described above.  Doesn't mind the area was 1st " cleaned with Betadine and then alcohol.  A field block of bupivacaine and epinephrine was used a total of 6 cc.  Then the area was opened with a #11 blade and approximately a cc of pus and blood was expressed.  Culture was obtained and sent for analysis.  Dressing was applied.    Critical Care time:  none               Labs Ordered and Resulted from Time of ED Arrival Up to the Time of Departure from the ED   CBC WITH PLATELETS DIFFERENTIAL - Abnormal; Notable for the following:        Result Value    WBC 17.0 (*)     Absolute Neutrophil 14.5 (*)     All other components within normal limits   COMPREHENSIVE METABOLIC PANEL - Abnormal; Notable for the following:     Glucose 120 (*)     All other components within normal limits   INR   LACTIC ACID WHOLE BLOOD   BLOOD CULTURE   BLOOD CULTURE   WOUND CULTURE AEROBIC BACTERIAL   WOUND CULTURE AEROBIC BACTERIAL     IV was established and fluid bolus was provided.  Blood work and blood cultures were ordered.  Patient was given Dilaudid for pain.  IV Ancef was ordered.  Patient's friend to presents with her states that the redness in the area is markedly extended distance her arrival in the department.  There is some slight serous discharge from the SHAY tube site.  No malka pus is present.  Ultrasound is ordered to assess for abscess and is described above.  Assessments & Plan (with Medical Decision Making)   Patient is a 42-year-old female presents with left axillary pain and redness with concerns for cellulitis.  Patient was recently diagnosed with melanoma of the axillary lymph nodes.  No primary skin lesion was not found.  She had axillary dissection and had a SHAY drain in place for 3 weeks.  They removed the drain yesterday and patient is noted increased redness and pain in this area.  No fever in the ER department.  Primary care provider began her on antibiotics today.  The patient thinks she vomited up the antibiotic.  She is on oxycodone for pain but has not had good  relief today with this.  Patient did have nausea and vomiting ×1 earlier.  It is nonbilious and nonbloody.  No nausea currently.  No diarrhea or dysuria.  Other than redness of the axilla and lateral chest no skin rash that been noted.  Patient's tetanus is up-to-date.  Blood work showed.  Blood cultures are pending.  Patient was given IV fluids, Ancef, Dilaudid, and Zofran.  Ultrasound obtained to look for abscess and they showed a fluid collection described above.  This was I&D described above.  After consultation with the hospitalist clindamycin and vancomycin was also added.  Dr. Delatorre from the hospitalist service will assume care on admission.        I have reviewed the nursing notes.    I have reviewed the findings, diagnosis, plan and need for follow up with the patient.       New Prescriptions    No medications on file       Final diagnoses:   Cellulitis of chest wall   Cellulitis and abscess of trunk       11/14/2017   [facility] EMERGENCY DEPARTMENT     Sunny Lane MD  11/14/17 1724

## 2017-11-16 LAB
CREAT SERPL-MCNC: 0.75 MG/DL (ref 0.52–1.04)
GFR SERPL CREATININE-BSD FRML MDRD: 85 ML/MIN/1.7M2
MRSA DNA SPEC QL NAA+PROBE: NEGATIVE
SPECIMEN SOURCE: NORMAL

## 2017-11-16 PROCEDURE — 25000132 ZZH RX MED GY IP 250 OP 250 PS 637: Performed by: FAMILY MEDICINE

## 2017-11-16 PROCEDURE — 87641 MR-STAPH DNA AMP PROBE: CPT | Performed by: FAMILY MEDICINE

## 2017-11-16 PROCEDURE — 12000007 ZZH R&B INTERMEDIATE

## 2017-11-16 PROCEDURE — 25000128 H RX IP 250 OP 636: Performed by: FAMILY MEDICINE

## 2017-11-16 PROCEDURE — 25000125 ZZHC RX 250: Performed by: FAMILY MEDICINE

## 2017-11-16 PROCEDURE — 82565 ASSAY OF CREATININE: CPT | Performed by: FAMILY MEDICINE

## 2017-11-16 PROCEDURE — 99233 SBSQ HOSP IP/OBS HIGH 50: CPT | Performed by: FAMILY MEDICINE

## 2017-11-16 PROCEDURE — 36415 COLL VENOUS BLD VENIPUNCTURE: CPT | Performed by: FAMILY MEDICINE

## 2017-11-16 PROCEDURE — 25000128 H RX IP 250 OP 636: Performed by: EMERGENCY MEDICINE

## 2017-11-16 PROCEDURE — 87640 STAPH A DNA AMP PROBE: CPT | Performed by: FAMILY MEDICINE

## 2017-11-16 PROCEDURE — S0077 INJECTION, CLINDAMYCIN PHOSP: HCPCS | Performed by: FAMILY MEDICINE

## 2017-11-16 RX ADMIN — CLINDAMYCIN PHOSPHATE 900 MG: 18 INJECTION, SOLUTION INTRAVENOUS at 00:10

## 2017-11-16 RX ADMIN — VENLAFAXINE HYDROCHLORIDE 75 MG: 75 TABLET, EXTENDED RELEASE ORAL at 08:11

## 2017-11-16 RX ADMIN — OXYCODONE HYDROCHLORIDE 15 MG: 15 TABLET ORAL at 21:11

## 2017-11-16 RX ADMIN — ACETAMINOPHEN 650 MG: 325 TABLET, FILM COATED ORAL at 16:25

## 2017-11-16 RX ADMIN — SENNOSIDES AND DOCUSATE SODIUM 1 TABLET: 8.6; 5 TABLET ORAL at 05:37

## 2017-11-16 RX ADMIN — OXYCODONE HYDROCHLORIDE 15 MG: 15 TABLET ORAL at 16:25

## 2017-11-16 RX ADMIN — OXYCODONE HYDROCHLORIDE 15 MG: 15 TABLET ORAL at 09:50

## 2017-11-16 RX ADMIN — CLINDAMYCIN PHOSPHATE 900 MG: 18 INJECTION, SOLUTION INTRAVENOUS at 16:05

## 2017-11-16 RX ADMIN — ACETAMINOPHEN 650 MG: 325 TABLET, FILM COATED ORAL at 00:10

## 2017-11-16 RX ADMIN — LORAZEPAM 1 MG: 1 TABLET ORAL at 21:22

## 2017-11-16 RX ADMIN — SODIUM CHLORIDE, POTASSIUM CHLORIDE, SODIUM LACTATE AND CALCIUM CHLORIDE 1000 ML: 600; 310; 30; 20 INJECTION, SOLUTION INTRAVENOUS at 12:44

## 2017-11-16 RX ADMIN — ACETAMINOPHEN 650 MG: 325 TABLET, FILM COATED ORAL at 08:11

## 2017-11-16 RX ADMIN — POLYETHYLENE GLYCOL 3350 17 G: 17 POWDER, FOR SOLUTION ORAL at 16:25

## 2017-11-16 RX ADMIN — CEFAZOLIN SODIUM 1 G: 1 INJECTION, SOLUTION INTRAVENOUS at 21:12

## 2017-11-16 RX ADMIN — OXYCODONE HYDROCHLORIDE 15 MG: 15 TABLET ORAL at 00:28

## 2017-11-16 RX ADMIN — CEFAZOLIN SODIUM 1 G: 1 INJECTION, SOLUTION INTRAVENOUS at 12:09

## 2017-11-16 RX ADMIN — OXYCODONE HYDROCHLORIDE 15 MG: 15 TABLET ORAL at 05:37

## 2017-11-16 RX ADMIN — ASPIRIN 81 MG: 81 TABLET, COATED ORAL at 08:11

## 2017-11-16 RX ADMIN — VANCOMYCIN HYDROCHLORIDE 1500 MG: 10 INJECTION, POWDER, LYOPHILIZED, FOR SOLUTION INTRAVENOUS at 09:40

## 2017-11-16 RX ADMIN — CLINDAMYCIN PHOSPHATE 900 MG: 18 INJECTION, SOLUTION INTRAVENOUS at 08:12

## 2017-11-16 RX ADMIN — LORAZEPAM 1 MG: 1 TABLET ORAL at 00:10

## 2017-11-16 RX ADMIN — LORAZEPAM 1 MG: 1 TABLET ORAL at 09:47

## 2017-11-16 NOTE — PROGRESS NOTES
"SPIRITUAL HEALTH SERVICES  SPIRITUAL ASSESSMENT Progress Note  [facility] [place] - Med/Surg    PRIMARY FOCUS:     Emotional/spiritual/Nondenominational distress    Support for coping    ILLNESS CIRCUMSTANCES:   Reviewed documentation. Reflective conversation shared with Doretha Fernandez which integrated elements of illness and family narratives.     Context of Serious Illness/Symptom(s) - Recent diagnosis of melanoma, cellulitis developing, was supposed to start chemo this week    Resources for Support - Father, sister, good friends    DISTRESS:     Emotional/Existential/Relational Distress - Doretha stated that along with the stress of her health she got a call an hour ago from \"my crazy ex-; and he is crazy\" - that he was going to go to  while I'm in the hospital and get the kids.  Doretha stated she has two daughters, Aura, 13 and 10.  She asked if there was anything she could do from here to keep that from happening.  I stated that I would talk with one of our social workers and they could stop to talk with her about that question.    Spiritual/Taoist Distress - Doretha stated that her ex- has been sober now for a year and in that time has \"found Adventism.\"  She stated he doesn't attend a Christianity; it's all on his own, at his house.  She is worried about his influence on their daughters.    SPIRITUAL/Scientology COPING:     Worship/Wendie - \"Nominal Zoroastrian\"    Spiritual Practice(s) - Doretha welcomed prayer    Emotional/Existential/Relational Connections - I asked about Doretha's support system and she stated that she can talk to her Dad and her sister, that they are supportive, and she has good friends.  She stated that sometimes when she reaches out to them she \"feels guilty\" that she's asking too much.  I assured her that if they are saying, \"Doretha, don't think that way\" that those are good friends and lean on them as needed.    GOALS OF CARE:    Goals of Care - To discharge home, be able to have " "her daughters come back under her roof and to start to work on her chemotherapy    Meaning/Sense-Making - I brought Doretha literature on \"Living with a Chronic Illness with Hope\" and \"Handling Stress on the Go\" to give her some resources related to her stress.  Doretha's family is very important to her.    PLAN: I spoke with DELIA Jules about Doretha's interest in talking with her and she stated she would stop to see her.    Nicola Watkins M.A., [facility]  Staff   [facility]  Office: [phone]  Cell: [phone]  Pager [phone]    "

## 2017-11-16 NOTE — PLAN OF CARE
Problem: Patient Care Overview  Goal: Plan of Care/Patient Progress Review  Outcome: Improving  Redness pulling back from markings in some areas with redness extending past others.  No drainage noted.  Pain controlled with oxycodone.  Pt extremely upset after speaking with ex- who noted he was going to call  to have her children removed from the house while she was in the hospital.  Discussed this with patient at length.  Pt requested Ativan.  Administered with patient able to calm down.

## 2017-11-16 NOTE — PLAN OF CARE
Problem: Patient Care Overview  Goal: Plan of Care/Patient Progress Review  Outcome: No Change  Red area on left breast has gone beyond marked area, New markings placed at MN. Scant to no drainage but wound is moist, gauze fluffs placed against breast w/o tape, arm on 2 pillows. Pain med given twice during night. HA minimal or resolved. Pt had many questions r/t why w/ ABX is redness spreading.

## 2017-11-16 NOTE — PROGRESS NOTES
Referral from chaplain Nicola, to check in with patient.  Patient is in distress over custody of children.  Her ex- is making threats to take the children to [facility]  and say she is an unfit mother.  Discussed situation and provided emotional support.      DELIA Thornton  [facility] [phone]/ [place] [phone]

## 2017-11-16 NOTE — PLAN OF CARE
Problem: Patient Care Overview  Goal: Plan of Care/Patient Progress Review  Outcome: Therapy, progress toward functional goals is gradual  Pt continues to have a lot of discomfort from infection.  Pt refusing dressing, but there is little drainage.  Pt has quit using her L arm and is needing help from staff to do anything at present.  I believe once pain and swelling decrease, pt will be more able to care for herself again.  Repositioned frequently for comfort.  JOSSIE Diane RN

## 2017-11-16 NOTE — PROGRESS NOTES
"SUBJECTIVE:   Pain is still severe  Anxiety is still quite severe  Has been depressed longstanding without improvement with her prozac -->welbutrin caused n/v -->started effexor 3.5 weeks ago,   No further fevers,   WBC down.    Redness slightly extended from yesterday.      ROS:4 point ROS including Respiratory, CV, GI and , other than that noted in the HPI,  is negative   No further vomiting.     OBJECTIVE:   /77 (BP Location: Right arm)  Pulse 75  Temp 97.4  F (36.3  C) (Oral)  Resp 16  Ht 1.6 m (5' 3\")  Wt 95.9 kg (211 lb 6.7 oz)  LMP 11/02/2017 (Exact Date)  SpO2 99%  BMI 37.45 kg/m2    GENERAL APPEARANCE:  Alert, still teary.  Appears less ill than on admission      RESP:clear      CV: regular rate and rhythm,  no murmur , edema: none       Abdomen: soft, nontender, no liver or spleen enlargement, no masses, BSs normal   Skin: no cyanosis, pallor, or jaundice  Still swelling and red from incision at left axilla down on to left breast to nipple and below. Less intense erythema but extended from yesterday's line.      CMP  Recent Labs  Lab 11/16/17  0630 11/15/17  0630 11/14/17  1859   NA  --  140 135   POTASSIUM  --  3.7 3.6   CHLORIDE  --  107 103   CO2  --  25 21   ANIONGAP  --  8 11   GLC  --  89 120*   BUN  --  8 10   CR 0.75 Canceled, Test credited  0.84 0.75   GFRESTIMATED 85 Canceled, Test credited  74 84   GFRESTBLACK >90 Canceled, Test credited  >90 >90   PATRICIA  --  7.9* 8.8   PROTTOTAL  --  6.4* 7.1   ALBUMIN  --  2.9* 3.5   BILITOTAL  --  0.2 0.4   ALKPHOS  --  62 75   AST  --  7 10   ALT  --  17 21     CBC  Recent Labs  Lab 11/15/17  0630 11/14/17  1859   WBC 9.8 17.0*   RBC 3.73* 4.56   HGB 10.3* 12.9   HCT 32.6* 38.6   MCV 87 85   MCH 27.6 28.3   MCHC 31.6 33.4   RDW 12.6 12.6    319     INR  Recent Labs  Lab 11/14/17  1916   INR 0.97     Arterial BloodGas  No lab results found in last 7 days.   Venous Blood Gas  No lab results found in last 7 days.    Medications     " ceFAZolin  1 g Intravenous Q8H     lactated ringers  1,000 mL Intravenous Once     aspirin  81 mg Oral Daily     clindamycin  900 mg Intravenous Q8H     iopamidol  80 mL Intravenous Once     sodium chloride 0.9 %  80 mL As instructed Once     venlafaxine  75 mg Oral Daily with breakfast       Intake/Output Summary (Last 24 hours) at 11/16/17 1200  Last data filed at 11/16/17 0825   Gross per 24 hour   Intake             3157 ml   Output             3300 ml   Net             -143 ml         ASSESSMENT: PLAN:   ASSESSMENT/PLAN:   L AXILLA WOUND INFECTION WITH ABSCESS  Pt s/p LN dissection surgery 3 weeks ago. Left with SHAY drain until 11/13--output was apparently 20ml/day until it was removed. --drain removed  11/13.  That evening started with redness and pain.  Clinic called in keflex and  Pt took just 1 dose --erethema/ pain worsened and pt came in to ED. US L UE as above. 1 cc pus was drained in ED. Wound / blood cx sent and pt started on vanc/ clinda. Had leucocytosis.  -WBC and fever improved.   -pain still severe  -redness extending as expected.    -with neg Cxs to date, will d/c vanc and change to strep and MSSA coverage with ancef.  Check nasal MRSA swab to help define risk further.       SEPSIS  2ndry to above  -rx as above     GENERALIZED ANXIETY WITH PANIC ATTACKS  Stable  -Ativan q.4 h. p.r.n. plus her underlying Effexor.   -increase effexor to 150mg daily.       MALIGNANT MELANOMA  Dx in LN on bx 3 weeks ago. Pt has a h/o  Basal cell CA L upper eyelid in '12. Currently no lesions were identified for melanoma on skin. She was to start chemotherapy with immunologics this week, but will need to be postponed while there is active infection     PROTHROMBIN MUTATION WITH H/O TIA  Continue aspirin     MILD INTERMITTENT ASTHMA  No symptoms at this time.  p.r.n. albuterol ordered     MIGRAINES  Got Imitrex 6 mg subQ in the Emergency Department.  This migraine came on despite fairly heavy dose of Dilaudid and  oxycodone  Stable.      DISPO  D/c vanco today and back to ancef , wound cx  NTD and with rapid spread strongly doubt MRSA.  Most likely strep.

## 2017-11-17 LAB
ALBUMIN SERPL-MCNC: 2.8 G/DL (ref 3.4–5)
ALP SERPL-CCNC: 60 U/L (ref 40–150)
ALT SERPL W P-5'-P-CCNC: 19 U/L (ref 0–50)
ANION GAP SERPL CALCULATED.3IONS-SCNC: 9 MMOL/L (ref 3–14)
AST SERPL W P-5'-P-CCNC: 17 U/L (ref 0–45)
BACTERIA SPEC CULT: ABNORMAL
BILIRUB SERPL-MCNC: 0.1 MG/DL (ref 0.2–1.3)
BUN SERPL-MCNC: 8 MG/DL (ref 7–30)
CALCIUM SERPL-MCNC: 8.7 MG/DL (ref 8.5–10.1)
CHLORIDE SERPL-SCNC: 105 MMOL/L (ref 94–109)
CO2 SERPL-SCNC: 26 MMOL/L (ref 20–32)
CREAT SERPL-MCNC: 0.79 MG/DL (ref 0.52–1.04)
ERYTHROCYTE [DISTWIDTH] IN BLOOD BY AUTOMATED COUNT: 12.4 % (ref 10–15)
GFR SERPL CREATININE-BSD FRML MDRD: 80 ML/MIN/1.7M2
GLUCOSE SERPL-MCNC: 82 MG/DL (ref 70–99)
HCT VFR BLD AUTO: 32.8 % (ref 35–47)
HGB BLD-MCNC: 10.7 G/DL (ref 11.7–15.7)
MCH RBC QN AUTO: 28.2 PG (ref 26.5–33)
MCHC RBC AUTO-ENTMCNC: 32.6 G/DL (ref 31.5–36.5)
MCV RBC AUTO: 86 FL (ref 78–100)
PLATELET # BLD AUTO: 328 10E9/L (ref 150–450)
POTASSIUM SERPL-SCNC: 4.1 MMOL/L (ref 3.4–5.3)
PROT SERPL-MCNC: 6.6 G/DL (ref 6.8–8.8)
RBC # BLD AUTO: 3.8 10E12/L (ref 3.8–5.2)
SODIUM SERPL-SCNC: 140 MMOL/L (ref 133–144)
SPECIMEN SOURCE: ABNORMAL
SPECIMEN SOURCE: ABNORMAL
WBC # BLD AUTO: 6.7 10E9/L (ref 4–11)

## 2017-11-17 PROCEDURE — 12000000 ZZH R&B MED SURG/OB

## 2017-11-17 PROCEDURE — 25000128 H RX IP 250 OP 636: Performed by: FAMILY MEDICINE

## 2017-11-17 PROCEDURE — 36416 COLLJ CAPILLARY BLOOD SPEC: CPT | Performed by: FAMILY MEDICINE

## 2017-11-17 PROCEDURE — 80053 COMPREHEN METABOLIC PANEL: CPT | Performed by: FAMILY MEDICINE

## 2017-11-17 PROCEDURE — 85027 COMPLETE CBC AUTOMATED: CPT | Performed by: FAMILY MEDICINE

## 2017-11-17 PROCEDURE — S0077 INJECTION, CLINDAMYCIN PHOSP: HCPCS | Performed by: FAMILY MEDICINE

## 2017-11-17 PROCEDURE — 25000132 ZZH RX MED GY IP 250 OP 250 PS 637: Performed by: FAMILY MEDICINE

## 2017-11-17 PROCEDURE — 99233 SBSQ HOSP IP/OBS HIGH 50: CPT | Performed by: FAMILY MEDICINE

## 2017-11-17 PROCEDURE — 25000125 ZZHC RX 250: Performed by: FAMILY MEDICINE

## 2017-11-17 RX ORDER — VENLAFAXINE HYDROCHLORIDE 150 MG/1
150 TABLET, EXTENDED RELEASE ORAL
Status: DISCONTINUED | OUTPATIENT
Start: 2017-11-18 | End: 2017-11-17

## 2017-11-17 RX ORDER — CEPHALEXIN 500 MG/1
1000 CAPSULE ORAL EVERY 12 HOURS SCHEDULED
Status: DISCONTINUED | OUTPATIENT
Start: 2017-11-17 | End: 2017-11-18 | Stop reason: HOSPADM

## 2017-11-17 RX ORDER — VENLAFAXINE HYDROCHLORIDE 150 MG/1
150 TABLET, EXTENDED RELEASE ORAL
Status: DISCONTINUED | OUTPATIENT
Start: 2017-11-17 | End: 2017-11-18 | Stop reason: HOSPADM

## 2017-11-17 RX ADMIN — CLINDAMYCIN PHOSPHATE 900 MG: 18 INJECTION, SOLUTION INTRAVENOUS at 09:10

## 2017-11-17 RX ADMIN — CEFAZOLIN SODIUM 1 G: 1 INJECTION, SOLUTION INTRAVENOUS at 03:18

## 2017-11-17 RX ADMIN — LORAZEPAM 1 MG: 1 TABLET ORAL at 21:52

## 2017-11-17 RX ADMIN — OXYCODONE HYDROCHLORIDE 15 MG: 15 TABLET ORAL at 23:46

## 2017-11-17 RX ADMIN — ACETAMINOPHEN 650 MG: 325 TABLET, FILM COATED ORAL at 15:31

## 2017-11-17 RX ADMIN — OXYCODONE HYDROCHLORIDE 15 MG: 15 TABLET ORAL at 13:47

## 2017-11-17 RX ADMIN — CLINDAMYCIN PHOSPHATE 900 MG: 18 INJECTION, SOLUTION INTRAVENOUS at 00:47

## 2017-11-17 RX ADMIN — ACETAMINOPHEN 650 MG: 325 TABLET, FILM COATED ORAL at 19:29

## 2017-11-17 RX ADMIN — OXYCODONE HYDROCHLORIDE 15 MG: 15 TABLET ORAL at 09:09

## 2017-11-17 RX ADMIN — VENLAFAXINE HYDROCHLORIDE 150 MG: 150 TABLET, EXTENDED RELEASE ORAL at 09:09

## 2017-11-17 RX ADMIN — ASPIRIN 81 MG: 81 TABLET, COATED ORAL at 09:09

## 2017-11-17 RX ADMIN — CLINDAMYCIN PHOSPHATE 900 MG: 18 INJECTION, SOLUTION INTRAVENOUS at 23:46

## 2017-11-17 RX ADMIN — CEPHALEXIN 1000 MG: 500 CAPSULE ORAL at 19:29

## 2017-11-17 RX ADMIN — OXYCODONE HYDROCHLORIDE 15 MG: 15 TABLET ORAL at 19:06

## 2017-11-17 RX ADMIN — OXYCODONE HYDROCHLORIDE 15 MG: 15 TABLET ORAL at 03:17

## 2017-11-17 RX ADMIN — CEPHALEXIN 1000 MG: 500 CAPSULE ORAL at 09:17

## 2017-11-17 RX ADMIN — CLINDAMYCIN PHOSPHATE 900 MG: 18 INJECTION, SOLUTION INTRAVENOUS at 15:35

## 2017-11-17 RX ADMIN — ACETAMINOPHEN 650 MG: 325 TABLET, FILM COATED ORAL at 06:48

## 2017-11-17 ASSESSMENT — PAIN DESCRIPTION - DESCRIPTORS: DESCRIPTORS: SQUEEZING

## 2017-11-17 NOTE — PROGRESS NOTES
"SUBJECTIVE:   Pain is still severe, but may be slightly better than on admission.    Doesn't like to move the arm.         ROS:4 point ROS including Respiratory, CV, GI and , other than that noted in the HPI,  is negative   No further vomiting.     OBJECTIVE:   /80 (BP Location: Right arm)  Pulse 80  Temp 98.3  F (36.8  C) (Oral)  Resp 20  Ht 1.6 m (5' 3\")  Wt 95.9 kg (211 lb 6.7 oz)  LMP 11/02/2017 (Exact Date)  SpO2 95%  BMI 37.45 kg/m2    GENERAL APPEARANCE:  Alert, still teary.  Appears less ill than on admission      RESP:clear      CV: regular rate and rhythm,  no murmur , edema: none       Abdomen: soft, nontender, no liver or spleen enlargement, no masses, BSs normal   Skin: no cyanosis, pallor, or jaundice  Still swelling and red from incision at left axilla down on to left breast but much less red and receding back from the original line.  Still some tenderness but she is tender down the arm as well just from the edema.  Has 1+ edema.     CMP    Recent Labs  Lab 11/17/17  0712 11/16/17  0630 11/15/17  0630 11/14/17  1859     --  140 135   POTASSIUM 4.1  --  3.7 3.6   CHLORIDE 105  --  107 103   CO2 26  --  25 21   ANIONGAP 9  --  8 11   GLC 82  --  89 120*   BUN 8  --  8 10   CR 0.79 0.75 Canceled, Test credited  0.84 0.75   GFRESTIMATED 80 85 Canceled, Test credited  74 84   GFRESTBLACK >90 >90 Canceled, Test credited  >90 >90   PATRICIA 8.7  --  7.9* 8.8   PROTTOTAL 6.6*  --  6.4* 7.1   ALBUMIN 2.8*  --  2.9* 3.5   BILITOTAL 0.1*  --  0.2 0.4   ALKPHOS 60  --  62 75   AST 17  --  7 10   ALT 19  --  17 21     CBC    Recent Labs  Lab 11/17/17  0712 11/15/17  0630 11/14/17  1859   WBC 6.7 9.8 17.0*   RBC 3.80 3.73* 4.56   HGB 10.7* 10.3* 12.9   HCT 32.8* 32.6* 38.6   MCV 86 87 85   MCH 28.2 27.6 28.3   MCHC 32.6 31.6 33.4   RDW 12.4 12.6 12.6    317 319     INR    Recent Labs  Lab 11/14/17 1916   INR 0.97     Arterial BloodGas  No lab results found in last 7 days.   Venous Blood " Gas  No lab results found in last 7 days.    Medications     venlafaxine  150 mg Oral Daily with breakfast     cephalexin  1,000 mg Oral Q12H MARCIO     sodium chloride (PF)  3 mL Intracatheter Q8H     aspirin  81 mg Oral Daily     clindamycin  900 mg Intravenous Q8H     iopamidol  80 mL Intravenous Once     sodium chloride 0.9 %  80 mL As instructed Once       Intake/Output Summary (Last 24 hours) at 11/16/17 1200  Last data filed at 11/16/17 0825   Gross per 24 hour   Intake             3157 ml   Output             3300 ml   Net             -143 ml         ASSESSMENT: PLAN:   ASSESSMENT/PLAN:   L AXILLA WOUND INFECTION WITH ABSCESS  Pt s/p LN dissection surgery 3 weeks ago. Left with SHAY drain until 11/13--output was apparently 20ml/day until it was removed. --drain removed  11/13.  That evening started with redness and pain.  Clinic called in keflex and  Pt took just 1 dose --erethema/ pain worsened and pt came in to ED. US L UE as above. 1 cc pus was drained in ED. Wound / blood cx sent and pt started on vanc/ clinda. Had leucocytosis.  -WBC and fever improved.   -pain still severe  -redness much better  -changed to ancef from vanco and still improving.  Still on the clinda.    -cultures growing light growth staph.  Suspect MSSA.    -will change to keflex to make sure she is tolerating and consider D/C this afternoon.       SEPSIS (fever, tachy, elevated WBC on admission)  2ndry to above  -rx as above     GENERALIZED ANXIETY WITH PANIC ATTACKS  Stable  -Ativan q.4 h. p.r.n. plus her underlying Effexor.   -increase effexor to 150mg daily.       MALIGNANT MELANOMA  Dx in LN on bx 3 weeks ago. Pt has a h/o  Basal cell CA L upper eyelid in '12. Currently no lesions were identified for melanoma on skin. She was to start chemotherapy with immunologics this week, but will need to be postponed while there is active infection     PROTHROMBIN MUTATION WITH H/O TIA  Continue aspirin     MILD INTERMITTENT ASTHMA  No symptoms at  this time.  p.r.n. albuterol ordered     MIGRAINES  Got Imitrex 6 mg subQ in the Emergency Department.  This migraine came on despite fairly heavy dose of Dilaudid and oxycodone  Stable.      DISPO  Off vanco 24 hrs and doing well.  Trial of oral abx and if doing well, consider DC home this afternoon.

## 2017-11-17 NOTE — PLAN OF CARE
"Problem: Patient Care Overview  Goal: Plan of Care/Patient Progress Review  Outcome: No Change  Pt calm this evening. Reports left axillary/breast pain as \"intolerable\" when moving. Medicated with oxycodone and tylenol as ordered with decrease in pain. Pt moves well for BRP's with SBA. Sat in chair for supper. Ate fair. IV Antibiotics as ordered. Afebrile. Light redness within marked area on back, increased down breast tissue. Small pencil eraser sized open area on lateral breast from biopsy or SHAY drain site moist. Area cleansed with soap and water and left open to air. Axillary incision edges approximated.       "

## 2017-11-17 NOTE — PROVIDER NOTIFICATION
Patient noted a small amount of drainage this morning from her chest.  The drainage was from the lower puncture site on her left breast.  The actual healing incision and other drain site have no drainage. Patient feels it looks more red by the incisions, no change noted by writer.

## 2017-11-17 NOTE — PROGRESS NOTES
Redness has stayed within the previously marked area. Patient has appeared to be sleeping well overnight.

## 2017-11-17 NOTE — PLAN OF CARE
Problem: Patient Care Overview  Goal: Plan of Care/Patient Progress Review  Outcome: Improving  Patient concerned this morning with drainage from wound site.  Noted small amount of drainage from lowest site and informed patient that this was okay.  Patient was also concerned that redness might be increasing.  Informed patient that redness was decreasing and reducing from marked area.  Pain controlled with PRN oxycodone x2.

## 2017-11-18 VITALS
RESPIRATION RATE: 18 BRPM | TEMPERATURE: 98.1 F | SYSTOLIC BLOOD PRESSURE: 107 MMHG | WEIGHT: 211.42 LBS | HEART RATE: 78 BPM | DIASTOLIC BLOOD PRESSURE: 70 MMHG | HEIGHT: 63 IN | BODY MASS INDEX: 37.46 KG/M2 | OXYGEN SATURATION: 94 %

## 2017-11-18 PROCEDURE — 25000132 ZZH RX MED GY IP 250 OP 250 PS 637: Performed by: FAMILY MEDICINE

## 2017-11-18 PROCEDURE — 99239 HOSP IP/OBS DSCHRG MGMT >30: CPT | Performed by: FAMILY MEDICINE

## 2017-11-18 PROCEDURE — 25000125 ZZHC RX 250: Performed by: FAMILY MEDICINE

## 2017-11-18 PROCEDURE — S0077 INJECTION, CLINDAMYCIN PHOSP: HCPCS | Performed by: FAMILY MEDICINE

## 2017-11-18 RX ORDER — OXYCODONE HYDROCHLORIDE 5 MG/1
5-15 TABLET ORAL EVERY 4 HOURS PRN
Qty: 40 TABLET | Refills: 0 | Status: ON HOLD | OUTPATIENT
Start: 2017-11-18 | End: 2018-03-08

## 2017-11-18 RX ORDER — VENLAFAXINE HYDROCHLORIDE 150 MG/1
150 TABLET, EXTENDED RELEASE ORAL
Qty: 30 EACH | Refills: 0 | Status: SHIPPED | OUTPATIENT
Start: 2017-11-18 | End: 2017-12-07

## 2017-11-18 RX ORDER — CEPHALEXIN 500 MG/1
1000 CAPSULE ORAL 2 TIMES DAILY
Qty: 24 CAPSULE | Refills: 0 | Status: SHIPPED | OUTPATIENT
Start: 2017-11-18 | End: 2018-01-18

## 2017-11-18 RX ADMIN — VENLAFAXINE HYDROCHLORIDE 150 MG: 150 TABLET, EXTENDED RELEASE ORAL at 08:49

## 2017-11-18 RX ADMIN — POLYETHYLENE GLYCOL 3350 17 G: 17 POWDER, FOR SOLUTION ORAL at 09:03

## 2017-11-18 RX ADMIN — ASPIRIN 81 MG: 81 TABLET, COATED ORAL at 08:49

## 2017-11-18 RX ADMIN — CEPHALEXIN 1000 MG: 500 CAPSULE ORAL at 08:49

## 2017-11-18 RX ADMIN — OXYCODONE HYDROCHLORIDE 15 MG: 15 TABLET ORAL at 08:49

## 2017-11-18 NOTE — DISCHARGE INSTRUCTIONS
For the cellulitis:   Would continue the cephalexin but at 2 tabs 2 times/day for the next 10 days  Would  follow up with Dr Cool in the next week  Moist packs to the axilla 2-3 times/day   Oxycodone 5-15 mg every 4 hrs as needed for pain.  Try to decrease this dose as soon as you can.     For the Lymphedema   Would keep the arm moving  Would  follow up with Lymphedema clinic and they may work on the arm despite the infection.     For the anxiety/depression  -would increase the effexor to 150 mg daily      Would recheck in clinic in the next week on the other issues as well.

## 2017-11-18 NOTE — PLAN OF CARE
Problem: Patient Care Overview  Goal: Plan of Care/Patient Progress Review  Outcome: Improving  Pt c/o pain from wound sites at the beginning of the night, received PRN 15 mg Oxycodone x1. No drainage noted from site, site is pink color. No increase swelling noted this shift. Vital signs stable, afebrile. Denies N/V or SOB. Pt received schedule IV Cleocin this night, IV is now saline locked. Pt sleeping on and off during the night. She is independent in her room. Pt able to make her needs be known.

## 2017-11-18 NOTE — PLAN OF CARE
Problem: Patient Care Overview  Goal: Plan of Care/Patient Progress Review  [facility] [facility] DISCHARGE NOTE    Patient discharged to home at 10:14 AM via wheel chair. Accompanied by father and staff. Discharge instructions reviewed with patient, opportunity offered to ask questions. Prescriptions sent to patients preferred pharmacy. All belongings sent with patient.    Valery Ingram

## 2017-11-18 NOTE — PLAN OF CARE
Problem: Patient Care Overview  Goal: Plan of Care/Patient Progress Review  Outcome: Improving  Pt doing well.pt did ask about the redness around her wound thought it was more red. This nurse did inform pt it look good only pinkish. It appears the cellulitis is shrinking back form the markings. Pt uses call light appropriately.

## 2017-11-18 NOTE — DISCHARGE SUMMARY
[place] [facility] Discharge Summary    Doretha Fernandez MRN# [id number]   Age: 42 year old YOB: 1975     Date of Admission:  11/14/2017  Date of Discharge::  11/18/2017 10:12 AM  Admitting Physician:  Lowell Delatorre MD  Discharge Physician:  Lowell Delatorre MD  Primary Physician: Anna Naidu       Home clinic: [facility]               Discharge Diagnosis:   Principle diagnosis: Sepsis secondary to Postop wound infection, left axilla    Secondary diagnoses:  Generalized anxiety disorder with panic attacks  Metastatic melanoma with recent left axillary lymph node dissection  History of TIA secondary to prothrombin mutation  Mild intermittent asthma  Migraine headaches     Discharge Instructions:   For the cellulitis:   Would continue the cephalexin but at 2 tabs 2 times/day for the next 10 days  Would  follow up with Dr Cool in the next week  Moist packs to the axilla 2-3 times/day   Oxycodone 5-15 mg every 4 hrs as needed for pain.  Try to decrease this dose as soon as you can.     For the Lymphedema   Would keep the arm moving  Would  follow up with Lymphedema clinic and they may work on the arm despite the infection.     For the anxiety/depression  -would increase the effexor to 150 mg daily      Would recheck in clinic in the next week on the other issues as well.        Follow up with primary care provider in 7 days        Procedures:       Results for orders placed or performed during the hospital encounter of 11/14/17   US Extremity Non Vascular Left    Narrative    US EXTREMITY NON VASCULAR LEFT 11/14/2017 8:55 PM     HISTORY: Left axillary cellulitis with concerns for abscess.      FINDINGS: Ultrasound in the area of patient's cellulitis left axillary  region is performed. A small complex appearing fluid collection is  noted in the area of patient's cellulitis concerning for abscess  measuring 2.0 x 0.8 x 1.0 cm.      Impression    IMPRESSION: Small abscess left axilla  in area of patient's cellulitis.    ALEX RIVERA MD                    Allergies:      Allergies   Allergen Reactions     Compazine [Prochlorperazine] Fatigue     Fentanyl Other (See Comments)     sweating     Erythrocin Nausea and Vomiting     Zithromax [Azithromycin Dihydrate] Diarrhea                  Discharge Medications:     Current Discharge Medication List      START taking these medications    Details   venlafaxine (EFFEXOR-ER) 150 MG TB24 24 hr tablet Take 1 tablet (150 mg) by mouth daily (with breakfast)  Qty: 30 each, Refills: 0    Associated Diagnoses: Mild major depression (H)         CONTINUE these medications which have CHANGED    Details   cephALEXin (KEFLEX) 500 MG capsule Take 2 capsules (1,000 mg) by mouth 2 times daily  Qty: 24 capsule, Refills: 0    Associated Diagnoses: Cellulitis of chest wall         CONTINUE these medications which have NOT CHANGED    Details   oxyCODONE IR (ROXICODONE) 15 MG tablet Take 1-2 tablets by mouth every 4 hours as needed      aspirin 81 MG EC tablet Take 81 mg by mouth daily      acetaminophen (TYLENOL) 325 MG tablet Take 2 tablets (650 mg) by mouth every 4 hours as needed for other (mild pain)  Qty: 100 tablet, Refills: 0    Associated Diagnoses: Malignant melanoma of left upper extremity including shoulder (H)      senna-docusate (SENOKOT-S;PERICOLACE) 8.6-50 MG per tablet Take 1 tablet by mouth 2 times daily as needed for constipation  Qty: 60 tablet, Refills: 1    Associated Diagnoses: Malignant melanoma of left upper extremity including shoulder (H)      LORazepam (ATIVAN) 1 MG tablet Take 1 tablet (1 mg) by mouth every 8 hours as needed for anxiety  Qty: 30 tablet, Refills: 0    Associated Diagnoses: Anxiety; Acute intractable tension-type headache      Cholecalciferol (VITAMIN D3) 3000 UNITS TABS Take 3,000 Int'l Units by mouth daily  Qty: 30 tablet, Refills: 3    Associated Diagnoses: Vitamin D deficiency      zonisamide (ZONEGRAN) 25 MG capsule Take 1  tablet (25 mg) once daily for 1 week, then 2 tablets once daily for 1 week, then 3 tablets once daily for 1 week, then 4 tablets once daily for 1 week.  Qty: 90 capsule, Refills: 1    Associated Diagnoses: Headache disorder         STOP taking these medications       venlafaxine (EFFEXOR-XR) 37.5 MG 24 hr capsule Comments:   Reason for Stopping:                     Consultations:   None           Brief History of Presenting Illness:   Doretha Fernandez had discovered a lymph node in her left axilla, and subsequently underwent a lymph node dissection 3 weeks ago resulting in 1 of 20 lymph nodes positive for what appears to be metastatic melanoma.  Despite extensive searches by 2 different dermatologists, no clear dermatologic source was found.       She had a Augustus-El drain in for 3 weeks with some problems with it plugging and needing replacement, but then over the last 4 days she only had 20 mL out per day, so they pulled the Augustus-El yesterday morning.  By yesterday evening she started noticing some redness and increased swelling around the site of the SHAY drain puncture, and then this morning noted marked increased swelling, redness and pain.  She sent a picture in to her surgeon who started her on Keflex over-the-counter, but she vomited that up and so came in.       She noted fever up to 101.2 at home.       On admission, she was quite frantic, anxious, in marked distress.  Pain was 10/10 and much worse than anything after surgery.  Pain was relatively well controlled with Dilaudid.  She had been taking 15 oxycodone q.4 h.  She was given 1 dose of Keflex, but there appeared to be some increase in the expansion of the redness down further onto the breast even in the 3 hours she was in the ED.  Therefore, ultrasound was done, showing no evidence of gas but a 1 x 2 cm fluid collection which is apparently right under the SHAY drain site.  There was a drop of drainage from that site just after the ultrasound.  In  the ED, Xylocaine was infiltrated, and the SHAY drain site was opened by ER physician.  She is admitted for IV antibiotics and further observation.           Hospital Course:   L AXILLA POST-OP WOUND INFECTION WITH ABSCESS  Pt s/p LN dissection surgery 3 weeks ago. Left with SHAY drain until 11/13--output was apparently 20ml/day until it was removed. --drain removed  11/13.  That evening started with redness and pain.  Clinic called in keflex and  Pt took just 1 dose --erethema/ pain worsened and pt came in to ED. US L UE as above. 1 cc pus was drained in ED. Wound / blood cx sent and pt started on vanc/ clinda. Had leucocytosis.  -WBC and fever improved.   -pain still severe  -redness much better  -changed to ancef from vanco and still improving.  Still on the clinda.    -cultures growing light growth staph.  Suspect MSSA.    -will change to keflex to make sure she is tolerating and consider D/C this afternoon.        SEPSIS (fever, tachy, elevated WBC on admission)  2ndry to above  -rx as above      GENERALIZED ANXIETY WITH PANIC ATTACKS  Stable  -Ativan q.4 h. p.r.n. plus her underlying Effexor.   -increase effexor to 150mg daily.        MALIGNANT MELANOMA  Dx in LN on bx 3 weeks ago. Pt has a h/o  Basal cell CA L upper eyelid in '12. Currently no lesions were identified for melanoma on skin. She was to start chemotherapy with immunologics this week, but will need to be postponed while there is active infection      PROTHROMBIN MUTATION WITH H/O TIA  Continue aspirin      MILD INTERMITTENT ASTHMA  No symptoms at this time.  p.r.n. albuterol ordered      MIGRAINES  Got Imitrex 6 mg subQ in the Emergency Department.  This migraine came on despite fairly heavy dose of Dilaudid and oxycodone  Stable.       DISPO  Off vanco 24 hrs and doing well.  Trial of oral abx and if doing well, consider DC home this afternoon.                   Discharge Exam:     RESP:clear      CV: regular rate and rhythm,  no murmur , edema: none        Abdomen: soft, nontender, no liver or spleen enlargement, no masses, BSs normal   Skin: no cyanosis, pallor, or jaundice  Still swelling and red from incision at left axilla down on to left breast but much less red and receding back from the original line.  Still some tenderness but she is tender down the arm as well just from the edema.  Has 1+ edema.            Pending Tests at Discharge:     Unresulted Labs Ordered in the Past 30 Days of this Admission     Date and Time Order Name Status Description    11/14/2017 1845 Blood culture Preliminary     11/14/2017 1845 Blood culture Preliminary                    Discharge Disposition:   Discharged to home      Attestation:  Amount of time performed on this discharge : 45 minutes.    Lowell Delatorre MD

## 2017-11-20 ENCOUNTER — CARE COORDINATION (OUTPATIENT)
Dept: CARE COORDINATION | Facility: CLINIC | Age: 42
End: 2017-11-20

## 2017-11-20 ENCOUNTER — TELEPHONE (OUTPATIENT)
Dept: ONCOLOGY | Facility: CLINIC | Age: 42
End: 2017-11-20

## 2017-11-20 LAB
BACTERIA SPEC CULT: NO GROWTH
BACTERIA SPEC CULT: NO GROWTH
Lab: NORMAL
Lab: NORMAL
SPECIMEN SOURCE: NORMAL
SPECIMEN SOURCE: NORMAL

## 2017-11-20 NOTE — TELEPHONE ENCOUNTER
Patient left message on writer's voicemail requesting a call back, did not state what the call was in relation to.  Call returned to patient, no answer.  Message left requesting returned call to clinic.  Direct line provided.

## 2017-11-20 NOTE — PROGRESS NOTES
Clinic Care Coordination Contact  OUTREACH    Referral Information:  Referral Source: IP/TCU Report  Reason for Contact: Post hospital follow up     Clinical Concerns:  Current Medical Concerns: Patient reports she is still having pain, but taking meds for the pain is helping. Pain is getting better overall. Patient has follow up appointment with PCP this week. Patient had questions about rescheduling all the other appointments she had to miss while in hospital .  CC encouraged Patient to call and re-schedule appointments. No other concerns reported at this time.   Current Behavioral Concerns: none    Education Provided to patient: Encouraged follow up appointment with PCP.      Plan: 1) Patient will continue to follow treatment plan as directed and follow up with PCP with concerns ongoing.   2) Care Coordination to remain available for future needs. Follow up planned for 3 weeks.    Сергей Mott RN  Clinic Care Coordinator  [phone] or [phone]

## 2017-11-21 ENCOUNTER — TELEPHONE (OUTPATIENT)
Dept: FAMILY MEDICINE | Facility: CLINIC | Age: 42
End: 2017-11-21

## 2017-11-21 DIAGNOSIS — B37.31 YEAST VAGINITIS: Primary | ICD-10-CM

## 2017-11-21 RX ORDER — FLUCONAZOLE 150 MG/1
150 TABLET ORAL ONCE
Qty: 4 TABLET | Refills: 0 | Status: SHIPPED | OUTPATIENT
Start: 2017-11-21 | End: 2017-11-21

## 2017-11-21 NOTE — TELEPHONE ENCOUNTER
"Reason for call:  Patient reporting a symptom    Symptom or request: Pt calling to report that she has a \"yeast infection\" from the large amounts of antibiotics that she is taking.  She would like an RX sent to [facility]    Duration (how long have symptoms been present): 2 days    Have you been treated for this before? Yes    Additional comments:     Phone Number patient can be reached at:  Home number on file [phone] (home)    Best Time:  any    Can we leave a detailed message on this number:  YES    Call taken on 11/21/2017 at 10:07 AM by Jena Quintero    "

## 2017-11-21 NOTE — TELEPHONE ENCOUNTER
S-(situation): Patient has been having yeast infection symtpoms    B-(background): Patient was has been hospitalized with antibiotics    A-(assessment):     Vaginal Symptoms      Duration: 3 days    Description  vaginal discharge - white, itching, burning and odor    Intensity:  moderate    Accompanying signs and symptoms (fever/dysuria/abdominal or back pain): None    History  Sexually active: not at present  Possibility of pregnancy: No  Recent antibiotic use: YES    Precipitating or alleviating factors: None    Therapies tried and outcome: none   Outcome: n/a    R-(recommendations): Will send to covering provider.    Thank you  Polly BAJWA RN       .

## 2017-11-22 ENCOUNTER — OFFICE VISIT (OUTPATIENT)
Dept: FAMILY MEDICINE | Facility: CLINIC | Age: 42
End: 2017-11-22
Payer: COMMERCIAL

## 2017-11-22 VITALS
OXYGEN SATURATION: 98 % | TEMPERATURE: 97.8 F | WEIGHT: 207 LBS | BODY MASS INDEX: 36.67 KG/M2 | DIASTOLIC BLOOD PRESSURE: 74 MMHG | HEART RATE: 88 BPM | SYSTOLIC BLOOD PRESSURE: 104 MMHG

## 2017-11-22 DIAGNOSIS — G25.81 RESTLESS LEG SYNDROME: ICD-10-CM

## 2017-11-22 DIAGNOSIS — F32.0 MILD MAJOR DEPRESSION (H): Primary | ICD-10-CM

## 2017-11-22 DIAGNOSIS — R19.7 DIARRHEA, UNSPECIFIED TYPE: ICD-10-CM

## 2017-11-22 PROCEDURE — 87493 C DIFF AMPLIFIED PROBE: CPT | Performed by: FAMILY MEDICINE

## 2017-11-22 PROCEDURE — 99495 TRANSJ CARE MGMT MOD F2F 14D: CPT | Performed by: FAMILY MEDICINE

## 2017-11-22 RX ORDER — ROPINIROLE 0.25 MG/1
0.25 TABLET, FILM COATED ORAL AT BEDTIME
Qty: 30 TABLET | Refills: 11 | Status: SHIPPED | OUTPATIENT
Start: 2017-11-22 | End: 2018-12-04

## 2017-11-22 ASSESSMENT — PAIN SCALES - GENERAL: PAINLEVEL: MODERATE PAIN (4)

## 2017-11-22 NOTE — MR AVS SNAPSHOT
After Visit Summary   11/22/2017    Doretha Fernandez    MRN: [id number]           Patient Information     Date Of Birth          1975        Visit Information        Provider Department      11/22/2017 10:40 AM Giuseppe Harris MD [facility]        Today's Diagnoses     Mild major depression (H)    -  1    Restless leg syndrome        Diarrhea, unspecified type           Follow-ups after your visit        Your next 10 appointments already scheduled     Nov 24, 2017 12:40 PM CST   LAB with [facility] ([facility])    [address] [facility] [address]   [phone]           Please do not eat 10-12 hours before your appointment if you are coming in fasting for labs on lipids, cholesterol, or glucose (sugar). This does not apply to pregnant women. Water, hot tea and black coffee (with nothing added) are okay. Do not drink other fluids, diet soda or chew gum.            Nov 24, 2017  1:15 PM CST   Return Visit with Kathy Richards MD   [facility] ([facility])    [facility]  [address]  [facility] [address]   [phone]            Nov 24, 2017  1:30 PM CST   Level 2 with ROOM 8 [facility] ([facility])    [facility]  [address]  [facility] [address]   [phone]            Nov 28, 2017  9:30 AM CST   Lymphedema Evaluation with Anna Graham, PT   [facility] Lymphedema ([facility])    [address] [address]   [phone]            Nov 30, 2017  9:30 AM CST   Level 2 with ROOM 3 [facility] ([facility])    [facility]  [address]  [facility] [address]   [phone]            Dec 07, 2017  8:30 AM CST   New Visit with DELIA Borja   [facility]  [facility] ([facility])    [address] [facility] [address]   [phone]            Jan 09, 2018 10:00 AM CST   Return Visit with Lowell Bullock MD   [facility] ([facility])    [address] [facility] [address]   [phone]              Future tests that were ordered for you today     Open Future Orders        Priority Expected Expires Ordered    Clostridium difficile Toxin B PCR Routine  12/22/2017 11/22/2017            Who to contact     If you have questions or need follow up information about today's clinic visit or your schedule please contact [facility] directly at [phone].  Normal or non-critical lab and imaging results will be communicated to you by [employer], letter or phone within 4 business days after the clinic has received the results. If you do not hear from us within 7 days, please contact the clinic through [employer] or phone. If you have a critical or abnormal lab result, we will notify you by phone as soon as possible.  Submit refill requests through [employer] or call your pharmacy and they will forward the refill request to us. Please allow 3 business days for your refill to be completed.          Additional Information About Your Visit        [employer]arMashalot Information     [employer] gives you secure access to your electronic health record. If you see a primary care provider, you can also send messages to your care team and make appointments. If you have questions, please call your primary care clinic.  If you do not have a primary care provider, please call [phone] and they will assist you.        Care EveryWhere ID     This is your Care EveryWhere ID. This could be used by other organizations to access your [place] medical records  [id number]        Your Vitals Were     Pulse Temperature Last Period Pulse Oximetry Breastfeeding? BMI (Body Mass Index)    88 97.8  F (36.6  C) (Tympanic) 11/02/2017 (Exact Date)  98% No 36.67 kg/m2       Blood Pressure from Last 3 Encounters:   11/22/17 104/74   11/18/17 107/70   11/09/17 105/75    Weight from Last 3 Encounters:   11/22/17 207 lb (93.9 kg)   11/14/17 211 lb 6.7 oz (95.9 kg)   11/09/17 208 lb 14.4 oz (94.8 kg)              We Performed the Following     DEPRESSION ACTION PLAN (DAP)          Today's Medication Changes          These changes are accurate as of: 11/22/17 11:58 AM.  If you have any questions, ask your nurse or doctor.               Start taking these medicines.        Dose/Directions    rOPINIRole 0.25 MG tablet   Commonly known as:  REQUIP   Used for:  Restless leg syndrome   Started by:  Giuseppe Harris MD        Dose:  0.25 mg   Take 1 tablet (0.25 mg) by mouth At Bedtime   Quantity:  30 tablet   Refills:  11            Where to get your medicines      [place] [facility] [address]  [address] [address] [address]     Phone:  [phone]     rOPINIRole 0.25 MG tablet                Primary Care Provider Office Phone # Fax #    Anna Naidu [phone] [address] [address] [facility] [address]        Equal Access to Services     DISHA [facility] AH: Hadii josy garnett hadasho Soalphonso, waaxda luqadaha, qaybta kaalmada chris, pancho castillo. So [facility] [phone].    ATENCIÓN: Si habla español, tiene a mcdonnell disposición servicios gratuitos de asistencia lingüística. Diana al [phone].    We comply with applicable federal civil rights laws and [place] laws. We do not discriminate on the basis of race, color, national origin, age, disability, sex, sexual orientation, or gender identity.            Thank you!     Thank you for choosing [facility]  for your care. Our goal is always to provide you with excellent care. Hearing back from our patients is one way we can continue to improve our services.  Please take a few minutes to complete the written survey that you may receive in the mail after your visit with us. Thank you!             Your Updated Medication List - Protect others around you: Learn how to safely use, store and throw away your medicines at www.disposemymeds.org.          This list is accurate as of: 11/22/17 11:58 AM.  Always use your most recent med list.                   Brand Name Dispense Instructions for use Diagnosis    acetaminophen 325 MG tablet    TYLENOL    100 tablet    Take 2 tablets (650 mg) by mouth every 4 hours as needed for other (mild pain)    Malignant melanoma of left upper extremity including shoulder (H)       aspirin 81 MG EC tablet      Take 81 mg by mouth daily        cephALEXin 500 MG capsule    KEFLEX    24 capsule    Take 2 capsules (1,000 mg) by mouth 2 times daily    Cellulitis of chest wall       LORazepam 1 MG tablet    ATIVAN    30 tablet    Take 1 tablet (1 mg) by mouth every 8 hours as needed for anxiety    Anxiety, Acute intractable tension-type headache       * oxyCODONE IR 15 MG tablet    ROXICODONE     Take 1-2 tablets by mouth every 4 hours as needed        * oxyCODONE IR 5 MG tablet    ROXICODONE    40 tablet    Take 1-3 tablets (5-15 mg) by mouth every 4 hours as needed for pain maximum 12 tablet(s) per day    Cellulitis of chest wall       rOPINIRole 0.25 MG tablet    REQUIP    30 tablet    Take 1 tablet (0.25 mg) by mouth At Bedtime    Restless leg syndrome       senna-docusate 8.6-50 MG per tablet    SENOKOT-S;PERICOLACE    60 tablet    Take 1 tablet by mouth 2 times daily as needed for constipation    Malignant melanoma of left upper extremity including shoulder (H)       venlafaxine 150 MG Tb24 24 hr tablet    EFFEXOR-ER    30 each    Take 1 tablet (150 mg) by mouth daily (with breakfast)    Mild major depression (H)       Vitamin D3 3000 UNITS Tabs     30 tablet    Take 3,000 Int'l Units by mouth daily    Vitamin D deficiency       zonisamide 25  MG capsule    Zonegran    90 capsule    Take 1 tablet (25 mg) once daily for 1 week, then 2 tablets once daily for 1 week, then 3 tablets once daily for 1 week, then 4 tablets once daily for 1 week.    Headache disorder       * Notice:  This list has 2 medication(s) that are the same as other medications prescribed for you. Read the directions carefully, and ask your doctor or other care provider to review them with you.

## 2017-11-22 NOTE — NURSING NOTE
"Chief Complaint   Patient presents with     Hospital F/U     11/14/17 PT believes she has cdif from all the antibiotics        Initial /74 (BP Location: Right arm, Patient Position: Chair, Cuff Size: Adult Large)  Pulse 88  Temp 97.8  F (36.6  C) (Tympanic)  Wt 207 lb (93.9 kg)  LMP 11/02/2017 (Exact Date)  SpO2 98%  Breastfeeding? No  BMI 36.67 kg/m2 Estimated body mass index is 36.67 kg/(m^2) as calculated from the following:    Height as of 11/14/17: 5' 3\" (1.6 m).    Weight as of this encounter: 207 lb (93.9 kg).  Medication Reconciliation: complete   Isaura Cortes CMA       "

## 2017-11-22 NOTE — PROGRESS NOTES
SUBJECTIVE:   Doretha Fernandez is a 42 year old female who presents to clinic today for the following health issues:    1 or 2 months ago she felt a lump in the left axilla. The biopsy came back melanoma. She had 2 independent complete skin checks and no primary was found. Apparently sometimes the body can fight off the primary and still have metastatic disease.  Then several weeks after the negative lymph node dissection the drain tube was pulled and shortly after that she started noticing swelling and a small amount of discharge. Eventually she was seen in the emergency room and admitted and a small abscess was drained. She was discharged 4 days ago on cephalexin and the cellulitis is rapidly improving. She is just left with minimal erythema about the left axilla and minimal tenderness about the incision.    She is very anxious about the diagnosis of melanoma. I asked her if she would be interested in knowing her percentage of 5 year survival. She is not sure she wants to know or not. I told her if it would make her feel better she could ask her oncologist.    She is having trouble sleeping because of anxiety and axillary pain. We will try having her take an oxycodone and lorazepam before bed.    She also is complaining of restless leg syndrome just recently. The lorazepam doesn't seem to help. She is not sure if she was to take another medication but I prescribed Requip 0.25 mg q.h.s. and put it on file in her pharmacy if she decides to try it.    She has been having diarrhea recently after all the antibiotics. She has had C. difficile in the past and this reminds her of that so we will check a stool for C. difficile.          Hospital Follow-up Visit:    Hospital/Nursing Home/IP Rehab Facility: [facility]  Date of Admission: 11/14/17  Date of Discharge: 11/18/17  Reason(s) for Admission: cellulitis and staff in fection            Problems taking medications regularly:  None       Medication changes  since discharge: antibiotics       Problems adhering to non-medication therapy:  Pt thinks she may have cdiff she has had this before    Summary of hospitalization:  [facility] discharge summary reviewed  Diagnostic Tests/Treatments reviewed.  Follow up needed: Oncology.  Other Healthcare Providers Involved in Patient s Care:         None  Update since discharge: improved.     Post Discharge Medication Reconciliation: discharge medications reconciled and changed, per note/orders (see AVS).  Plan of care communicated with patient     Coding guidelines for this visit:  Type of Medical   Decision Making Face-to-Face Visit       within 7 Days of discharge Face-to-Face Visit        within 14 days of discharge   Moderate Complexity [id number]   High Complexity 82947 20594                  Problem list and histories reviewed & adjusted, as indicated.  Additional history: as documented    Patient Active Problem List   Diagnosis     CARDIOVASCULAR SCREENING; LDL GOAL LESS THAN 160     DUB (dysfunctional uterine bleeding)     Anxiety state     Esophageal reflux     Mild major depression (H)     Mild intermittent asthma without complication     Vision changes     Acute non intractable tension-type headache     Prothrombin mutation (H)     Metastatic malignant melanoma (H)     Malignant melanoma of left upper extremity including shoulder (H)     Melanoma (H)     Sepsis due to cellulitis (H)     Past Surgical History:   Procedure Laterality Date     COLONOSCOPY N/A 10/18/2017    Procedure: COLONOSCOPY;  Colon;  Surgeon: Debbie Stephens MD;  Location: [facility] OR     DISSECT LYMPH NODE AXILLA Left 10/23/2017    Procedure: DISSECT LYMPH NODE AXILLA;  Left Axillary Lymph Node Dissection ;  Surgeon: Laurent Cool MD;  Location: [facility] OR     GYN SURGERY           REPAIR MOHS Left 2017    Procedure: REPAIR MOHS;  Left Upper Lid Moh's Reconstruction;  Surgeon: Kisha Bosch MD;  Location:  OR       Social History    Substance Use Topics     Smoking status: Passive Smoke Exposure - Never Smoker     Last attempt to quit: 3/20/1998     Smokeless tobacco: Never Used     Alcohol use No      Comment: occ     Family History   Problem Relation Age of Onset     CANCER Mother 45     lung     Neurologic Disorder Mother      Lipids Father      GASTROINTESTINAL DISEASE Father      Depression Father      CANCER Maternal Grandmother      Blood Disease Maternal Grandmother      Arthritis Maternal Grandmother      DIABETES Maternal Grandmother      Depression Maternal Grandmother      Macular Degeneration Maternal Grandmother      Glaucoma Maternal Grandmother      DIABETES Maternal Grandfather      CEREBROVASCULAR DISEASE Maternal Grandfather      Blood Disease Maternal Grandfather      HEART DISEASE Maternal Grandfather      Glaucoma Maternal Grandfather      CANCER Paternal Grandmother      Cancer - colorectal Paternal Grandmother      Respiratory Paternal Grandfather      Blood Disease Paternal Grandfather      HEART DISEASE Daughter      Asthma Daughter      Depression Sister              Reviewed and updated as needed this visit by clinical staffTobacco  Allergies  Med Hx  Surg Hx  Fam Hx  Soc Hx      Reviewed and updated as needed this visit by Provider         ROS:  CONSTITUTIONAL:NEGATIVE for fever, chills, change in weight  INTEGUMENTARY/SKIN: Resolving cellulitis.  PSYCHIATRIC: anxiety    OBJECTIVE:     /74 (BP Location: Right arm, Patient Position: Chair, Cuff Size: Adult Large)  Pulse 88  Temp 97.8  F (36.6  C) (Tympanic)  Wt 207 lb (93.9 kg)  LMP 11/02/2017 (Exact Date)  SpO2 98%  Breastfeeding? No  BMI 36.67 kg/m2  Body mass index is 36.67 kg/(m^2).  GENERAL: healthy, alert and no distress  SKIN: Resolving cellulitis now just involving the axillary area, the entire left breast is free of cellulitis.        ASSESSMENT/PLAN:               ICD-10-CM    1. Mild major depression (H) F32.0 DEPRESSION ACTION PLAN  (DAP)   2. Restless leg syndrome G25.81 rOPINIRole (REQUIP) 0.25 MG tablet   3. Diarrhea, unspecified type R19.7 Clostridium difficile Toxin B PCR     Clostridium difficile Toxin B PCR     Total face to face time: 25 minutes.  Time spent counseling on 15 minutes as outline above.      Giuseppe Harris MD  [facility]

## 2017-11-23 LAB
C DIFF TOX B STL QL: NEGATIVE
SPECIMEN SOURCE: NORMAL

## 2017-11-24 ENCOUNTER — INFUSION THERAPY VISIT (OUTPATIENT)
Dept: INFUSION THERAPY | Facility: CLINIC | Age: 42
End: 2017-11-24
Attending: INTERNAL MEDICINE
Payer: COMMERCIAL

## 2017-11-24 ENCOUNTER — ONCOLOGY VISIT (OUTPATIENT)
Dept: ONCOLOGY | Facility: CLINIC | Age: 42
End: 2017-11-24
Attending: INTERNAL MEDICINE
Payer: COMMERCIAL

## 2017-11-24 ENCOUNTER — HOSPITAL ENCOUNTER (OUTPATIENT)
Dept: LAB | Facility: CLINIC | Age: 42
Discharge: HOME | End: 2017-11-24
Attending: INTERNAL MEDICINE | Admitting: INTERNAL MEDICINE
Payer: COMMERCIAL

## 2017-11-24 VITALS
RESPIRATION RATE: 18 BRPM | BODY MASS INDEX: 36.82 KG/M2 | HEIGHT: 63 IN | HEART RATE: 84 BPM | WEIGHT: 207.8 LBS | OXYGEN SATURATION: 96 % | TEMPERATURE: 98.2 F | DIASTOLIC BLOOD PRESSURE: 79 MMHG | SYSTOLIC BLOOD PRESSURE: 111 MMHG

## 2017-11-24 VITALS — HEART RATE: 72 BPM | DIASTOLIC BLOOD PRESSURE: 70 MMHG | SYSTOLIC BLOOD PRESSURE: 110 MMHG

## 2017-11-24 DIAGNOSIS — F32.0 MILD MAJOR DEPRESSION (H): ICD-10-CM

## 2017-11-24 DIAGNOSIS — C43.62 MALIGNANT MELANOMA OF LEFT UPPER EXTREMITY INCLUDING SHOULDER (H): Primary | ICD-10-CM

## 2017-11-24 DIAGNOSIS — C43.9 METASTATIC MALIGNANT MELANOMA (H): Primary | ICD-10-CM

## 2017-11-24 LAB
ALBUMIN SERPL-MCNC: 3.8 G/DL (ref 3.4–5)
ALP SERPL-CCNC: 68 U/L (ref 40–150)
ALT SERPL W P-5'-P-CCNC: 42 U/L (ref 0–50)
ANION GAP SERPL CALCULATED.3IONS-SCNC: 8 MMOL/L (ref 3–14)
AST SERPL W P-5'-P-CCNC: 25 U/L (ref 0–45)
BILIRUB SERPL-MCNC: 0.2 MG/DL (ref 0.2–1.3)
BUN SERPL-MCNC: 13 MG/DL (ref 7–30)
CALCIUM SERPL-MCNC: 9 MG/DL (ref 8.5–10.1)
CHLORIDE SERPL-SCNC: 107 MMOL/L (ref 94–109)
CO2 SERPL-SCNC: 22 MMOL/L (ref 20–32)
CREAT SERPL-MCNC: 0.77 MG/DL (ref 0.52–1.04)
GFR SERPL CREATININE-BSD FRML MDRD: 83 ML/MIN/1.7M2
GLUCOSE SERPL-MCNC: 98 MG/DL (ref 70–99)
POTASSIUM SERPL-SCNC: 4.5 MMOL/L (ref 3.4–5.3)
PROT SERPL-MCNC: 7.9 G/DL (ref 6.8–8.8)
SODIUM SERPL-SCNC: 137 MMOL/L (ref 133–144)
TSH SERPL DL<=0.005 MIU/L-ACNC: 2.41 MU/L (ref 0.4–4)

## 2017-11-24 PROCEDURE — 84443 ASSAY THYROID STIM HORMONE: CPT | Performed by: INTERNAL MEDICINE

## 2017-11-24 PROCEDURE — 96375 TX/PRO/DX INJ NEW DRUG ADDON: CPT

## 2017-11-24 PROCEDURE — 80053 COMPREHEN METABOLIC PANEL: CPT | Performed by: INTERNAL MEDICINE

## 2017-11-24 PROCEDURE — S0028 INJECTION, FAMOTIDINE, 20 MG: HCPCS | Performed by: INTERNAL MEDICINE

## 2017-11-24 PROCEDURE — 25000125 ZZHC RX 250: Performed by: INTERNAL MEDICINE

## 2017-11-24 PROCEDURE — 99211 OFF/OP EST MAY X REQ PHY/QHP: CPT

## 2017-11-24 PROCEDURE — 99214 OFFICE O/P EST MOD 30 MIN: CPT | Performed by: INTERNAL MEDICINE

## 2017-11-24 PROCEDURE — 36415 COLL VENOUS BLD VENIPUNCTURE: CPT | Performed by: INTERNAL MEDICINE

## 2017-11-24 PROCEDURE — 25000128 H RX IP 250 OP 636: Performed by: INTERNAL MEDICINE

## 2017-11-24 PROCEDURE — 96413 CHEMO IV INFUSION 1 HR: CPT

## 2017-11-24 RX ORDER — EPINEPHRINE 1 MG/ML
0.3 INJECTION, SOLUTION, CONCENTRATE INTRAVENOUS EVERY 5 MIN PRN
Status: CANCELLED | OUTPATIENT
Start: 2017-11-24

## 2017-11-24 RX ORDER — EPINEPHRINE 1 MG/ML
0.3 INJECTION, SOLUTION, CONCENTRATE INTRAVENOUS EVERY 5 MIN PRN
Status: CANCELLED | OUTPATIENT
Start: 2017-12-07

## 2017-11-24 RX ORDER — METHYLPREDNISOLONE SODIUM SUCCINATE 125 MG/2ML
125 INJECTION, POWDER, LYOPHILIZED, FOR SOLUTION INTRAMUSCULAR; INTRAVENOUS
Status: CANCELLED
Start: 2017-12-07

## 2017-11-24 RX ORDER — SODIUM CHLORIDE 9 MG/ML
1000 INJECTION, SOLUTION INTRAVENOUS CONTINUOUS PRN
Status: CANCELLED
Start: 2017-11-24

## 2017-11-24 RX ORDER — PROCHLORPERAZINE MALEATE 10 MG
10 TABLET ORAL EVERY 6 HOURS PRN
Qty: 30 TABLET | Refills: 2 | Status: SHIPPED | OUTPATIENT
Start: 2017-11-24 | End: 2017-11-24

## 2017-11-24 RX ORDER — MEPERIDINE HYDROCHLORIDE 25 MG/ML
25 INJECTION INTRAMUSCULAR; INTRAVENOUS; SUBCUTANEOUS EVERY 30 MIN PRN
Status: CANCELLED | OUTPATIENT
Start: 2017-11-24

## 2017-11-24 RX ORDER — EPINEPHRINE 0.3 MG/.3ML
0.3 INJECTION SUBCUTANEOUS EVERY 5 MIN PRN
Status: CANCELLED | OUTPATIENT
Start: 2017-12-07

## 2017-11-24 RX ORDER — ALBUTEROL SULFATE 90 UG/1
1-2 AEROSOL, METERED RESPIRATORY (INHALATION)
Status: CANCELLED
Start: 2017-11-24

## 2017-11-24 RX ORDER — DIPHENHYDRAMINE HYDROCHLORIDE 50 MG/ML
50 INJECTION INTRAMUSCULAR; INTRAVENOUS
Status: CANCELLED
Start: 2017-12-07

## 2017-11-24 RX ORDER — LORAZEPAM 2 MG/ML
0.5 INJECTION INTRAMUSCULAR EVERY 4 HOURS PRN
Status: CANCELLED
Start: 2017-11-24

## 2017-11-24 RX ORDER — SODIUM CHLORIDE 9 MG/ML
1000 INJECTION, SOLUTION INTRAVENOUS CONTINUOUS PRN
Status: CANCELLED
Start: 2017-12-07

## 2017-11-24 RX ORDER — EPINEPHRINE 0.3 MG/.3ML
0.3 INJECTION SUBCUTANEOUS EVERY 5 MIN PRN
Status: CANCELLED | OUTPATIENT
Start: 2017-11-24

## 2017-11-24 RX ORDER — ALBUTEROL SULFATE 0.83 MG/ML
2.5 SOLUTION RESPIRATORY (INHALATION)
Status: CANCELLED | OUTPATIENT
Start: 2017-11-24

## 2017-11-24 RX ORDER — ALBUTEROL SULFATE 90 UG/1
1-2 AEROSOL, METERED RESPIRATORY (INHALATION)
Status: CANCELLED
Start: 2017-12-07

## 2017-11-24 RX ORDER — METHYLPREDNISOLONE SODIUM SUCCINATE 125 MG/2ML
125 INJECTION, POWDER, LYOPHILIZED, FOR SOLUTION INTRAMUSCULAR; INTRAVENOUS
Status: CANCELLED
Start: 2017-11-24

## 2017-11-24 RX ORDER — LORAZEPAM 0.5 MG/1
0.5 TABLET ORAL EVERY 4 HOURS PRN
Qty: 30 TABLET | Refills: 2 | Status: SHIPPED | OUTPATIENT
Start: 2017-11-24 | End: 2017-11-24 | Stop reason: DRUGHIGH

## 2017-11-24 RX ORDER — MEPERIDINE HYDROCHLORIDE 25 MG/ML
25 INJECTION INTRAMUSCULAR; INTRAVENOUS; SUBCUTANEOUS EVERY 30 MIN PRN
Status: CANCELLED | OUTPATIENT
Start: 2017-12-07

## 2017-11-24 RX ORDER — ALBUTEROL SULFATE 0.83 MG/ML
2.5 SOLUTION RESPIRATORY (INHALATION)
Status: CANCELLED | OUTPATIENT
Start: 2017-12-07

## 2017-11-24 RX ORDER — LORAZEPAM 2 MG/ML
0.5 INJECTION INTRAMUSCULAR EVERY 4 HOURS PRN
Status: CANCELLED
Start: 2017-12-07

## 2017-11-24 RX ORDER — DIPHENHYDRAMINE HYDROCHLORIDE 50 MG/ML
50 INJECTION INTRAMUSCULAR; INTRAVENOUS
Status: CANCELLED
Start: 2017-11-24

## 2017-11-24 RX ADMIN — SODIUM CHLORIDE 280 MG: 9 INJECTION, SOLUTION INTRAVENOUS at 14:22

## 2017-11-24 RX ADMIN — FAMOTIDINE 20 MG: 10 INJECTION, SOLUTION INTRAVENOUS at 14:16

## 2017-11-24 RX ADMIN — SODIUM CHLORIDE 250 ML: 9 INJECTION, SOLUTION INTRAVENOUS at 14:16

## 2017-11-24 ASSESSMENT — PAIN SCALES - GENERAL: PAINLEVEL: MILD PAIN (3)

## 2017-11-24 NOTE — MR AVS SNAPSHOT
After Visit Summary   11/24/2017    Doretha Fernandez    MRN: [id number]           Patient Information     Date Of Birth          1975        Visit Information        Provider Department      11/24/2017 1:30 PM ROOM 8 [facility]        Today's Diagnoses     Malignant melanoma of left upper extremity including shoulder (H)    -  1       Follow-ups after your visit        Your next 10 appointments already scheduled     Nov 27, 2017  9:00 AM CST   New Visit with Jacob High PsyD   [facility] ([facility])    [facility]  [address]  [facility] [address]   [phone]            Nov 28, 2017  9:30 AM CST   Lymphedema Evaluation with Anna Graham PT   [facility] L[facility]ema ([facility])    [address] [place] [address]   [phone]            Dec 07, 2017  8:30 AM CST   New Visit with DELIA Borja   [facility] ([facility])    [address] [place] [address]   [phone]            Dec 07, 2017 11:00 AM CST   Return Visit with Kathy Richards MD   [facility] ([facility])    [facility]  [address]  [facility] [address]   [phone]            Dec 07, 2017 11:30 AM CST   Level 2 with ROOM 3 [facility] ([facility])    [facility]  [address]  [facility] [address]   [phone]            Jan 09, 2018 10:00 AM CST   Return Visit with Lowell Bullock MD   [facility] ([facility])    [address] [facility] [address]   [phone]              Who to contact     If you have questions or need follow up information about today's clinic visit or your schedule please contact [facility] directly at [phone].  Normal or non-critical lab  and imaging results will be communicated to you by [employer], letter or phone within 4 business days after the clinic has received the results. If you do not hear from us within 7 days, please contact the clinic through [employer] or phone. If you have a critical or abnormal lab result, we will notify you by phone as soon as possible.  Submit refill requests through [employer] or call your pharmacy and they will forward the refill request to us. Please allow 3 business days for your refill to be completed.          Additional Information About Your Visit        [employer]Proof Information     [employer] gives you secure access to your electronic health record. If you see a primary care provider, you can also send messages to your care team and make appointments. If you have questions, please call your primary care clinic.  If you do not have a primary care provider, please call [phone] and they will assist you.        Care EveryWhere ID     This is your Care EveryWhere ID. This could be used by other organizations to access your [place] medical records  [id number]        Your Vitals Were     Pulse Last Period                72 11/02/2017 (Exact Date)           Blood Pressure from Last 3 Encounters:   11/24/17 110/70   11/24/17 111/79   11/22/17 104/74    Weight from Last 3 Encounters:   11/24/17 94.3 kg (207 lb 12.8 oz)   11/22/17 93.9 kg (207 lb)   11/14/17 95.9 kg (211 lb 6.7 oz)              We Performed the Following     Comprehensive metabolic panel     TSH with free T4 reflex          Today's Medication Changes          These changes are accurate as of: 11/24/17  4:33 PM.  If you have any questions, ask your nurse or doctor.               These medicines have changed or have updated prescriptions.        Dose/Directions    oxyCODONE IR 5 MG tablet   Commonly known as:  ROXICODONE   This may have changed:  Another medication with the same name was removed. Continue taking this medication, and follow the directions you see  here.   Used for:  Cellulitis of chest wall        Dose:  5-15 mg   Take 1-3 tablets (5-15 mg) by mouth every 4 hours as needed for pain maximum 12 tablet(s) per day   Quantity:  40 tablet   Refills:  0                Primary Care Provider Office Phone # Fax #    Anna Naidu [phone] [address]  [facility] [address]        Equal Access to Services     [facility] : Hadii aad ku hadasho Soomaali, waaxda luqadaha, qaybta kaalmada adeegyada, waxay idiin hayzeyadn adeeg sharon ladkbeth . So [facility] [phone].    ATENCIÓN: Si jessicala heidi, tiene a mcdonnell disposición servicios gratuitos de asistencia lingüística. Llame al [phone].    We comply with applicable federal civil rights laws and [place] laws. We do not discriminate on the basis of race, color, national origin, age, disability, sex, sexual orientation, or gender identity.            Thank you!     Thank you for choosing [facility]  for your care. Our goal is always to provide you with excellent care. Hearing back from our patients is one way we can continue to improve our services. Please take a few minutes to complete the written survey that you may receive in the mail after your visit with us. Thank you!             Your Updated Medication List - Protect others around you: Learn how to safely use, store and throw away your medicines at www.disposemymeds.org.          This list is accurate as of: 11/24/17  4:33 PM.  Always use your most recent med list.                   Brand Name Dispense Instructions for use Diagnosis    acetaminophen 325 MG tablet    TYLENOL    100 tablet    Take 2 tablets (650 mg) by mouth every 4 hours as needed for other (mild pain)    Malignant melanoma of left upper extremity including shoulder (H)       aspirin 81 MG EC tablet      Take 81 mg by mouth daily        cephALEXin 500 MG capsule    KEFLEX    24 capsule    Take 2 capsules (1,000 mg) by mouth 2 times daily    Cellulitis of  chest wall       LORazepam 1 MG tablet    ATIVAN    30 tablet    Take 1 tablet (1 mg) by mouth every 8 hours as needed for anxiety    Anxiety, Acute intractable tension-type headache       oxyCODONE IR 5 MG tablet    ROXICODONE    40 tablet    Take 1-3 tablets (5-15 mg) by mouth every 4 hours as needed for pain maximum 12 tablet(s) per day    Cellulitis of chest wall       rOPINIRole 0.25 MG tablet    REQUIP    30 tablet    Take 1 tablet (0.25 mg) by mouth At Bedtime    Restless leg syndrome       senna-docusate 8.6-50 MG per tablet    SENOKOT-S;PERICOLACE    60 tablet    Take 1 tablet by mouth 2 times daily as needed for constipation    Malignant melanoma of left upper extremity including shoulder (H)       venlafaxine 150 MG Tb24 24 hr tablet    EFFEXOR-ER    30 each    Take 1 tablet (150 mg) by mouth daily (with breakfast)    Mild major depression (H)       Vitamin D3 3000 UNITS Tabs     30 tablet    Take 3,000 Int'l Units by mouth daily    Vitamin D deficiency       zonisamide 25 MG capsule    Zonegran    90 capsule    Take 1 tablet (25 mg) once daily for 1 week, then 2 tablets once daily for 1 week, then 3 tablets once daily for 1 week, then 4 tablets once daily for 1 week.    Headache disorder

## 2017-11-24 NOTE — PROGRESS NOTES
Infusion Nursing Note:  Doretha Fernandez presents today for peripheral labs, MD appt., and IV Opdivo.    Patient seen by provider today: Yes: Dr. Richards.   present during visit today: Not Applicable.    Note: Labs WNL's. Premeds and IV Opdivo infused via a peripheral IV without complications. Pt. To return on 12/7/17 for labs, MD appt., and IV Opdivo.    Intravenous Access:  Peripheral IV placed.  Labs drawn peripherally.  .    Treatment Conditions:  Lab Results   Component Value Date     11/24/2017                   Lab Results   Component Value Date    POTASSIUM 4.5 11/24/2017           No results found for: MAG         Lab Results   Component Value Date    CR 0.77 11/24/2017                   Lab Results   Component Value Date    PATRICIA 9.0 11/24/2017                Lab Results   Component Value Date    BILITOTAL 0.2 11/24/2017           Lab Results   Component Value Date    ALBUMIN 3.8 11/24/2017                    Lab Results   Component Value Date    ALT 42 11/24/2017           Lab Results   Component Value Date    AST 25 11/24/2017     Results reviewed, labs MET treatment parameters, ok to proceed with treatment.        Post Infusion Assessment:  Patient tolerated infusion without incident.  Blood return noted pre and post infusion.  Access discontinued per protocol.    Discharge Plan:   Patient and/or family verbalized understanding of discharge instructions and all questions answered.  Patient discharged in stable condition accompanied by: self.  Departure Mode: Ambulatory.    Michelle Weaver RN

## 2017-11-24 NOTE — NURSING NOTE
"Oncology Rooming Note    November 24, 2017 1:15 PM   Doretha Fernandez is a 42 year old female who presents for:    Chief Complaint   Patient presents with     Oncology Clinic Visit     2 week post hospital follow up Malignant Melanoma.      Initial Vitals: /79 (BP Location: Right arm, Patient Position: Sitting, Cuff Size: Adult Large)  Pulse 84  Temp 98.2  F (36.8  C) (Oral)  Resp 18  Ht 1.6 m (5' 3\")  Wt 94.3 kg (207 lb 12.8 oz)  LMP 11/02/2017 (Exact Date)  SpO2 96%  Breastfeeding? No  BMI 36.81 kg/m2 Estimated body mass index is 36.81 kg/(m^2) as calculated from the following:    Height as of this encounter: 1.6 m (5' 3\").    Weight as of this encounter: 94.3 kg (207 lb 12.8 oz). Body surface area is 2.05 meters squared.  Mild Pain (3) Comment: left side   Patient's last menstrual period was 11/02/2017 (exact date).  Allergies reviewed: Yes  Medications reviewed: Yes    Medications: Medication refills not needed today.  Pharmacy name entered into [facility]: [facility] [place], [place] - [address]    Clinical concerns: 4 week post op Melanoma, 1 week post hospitalization due to Sepsis around GP drain.       7 minutes for nursing intake (face to face time)     Dai Canales CMA            "

## 2017-11-24 NOTE — MR AVS SNAPSHOT
After Visit Summary   11/24/2017    Doretha Fernandez    MRN: [id number]           Patient Information     Date Of Birth          1975        Visit Information        Provider Department      11/24/2017 1:15 PM Kathy Richards MD [facility] ONCOLOGY      Today's Diagnoses     Metastatic malignant melanoma (H)    -  1    Mild major depression (H)          Care Instructions    Dr. Richards would like to refer you to Dr. High and for you to continue with Opdivo today as planned. We would like to see you back in 2 weeks with next treatment. When you are in need of a refill, please call your pharmacy and they will send us a request.  Copy of appointments, and after visit summary (AVS) given to patient.  If you have any questions during business hours (M-F 8 AM- 4PM), please call Jeanie Keenan RN, BSN, OCN Oncology Hematology /Breast Cancer Navigator at [facility] [phone].   For questions after business hours, or on holidays/weekends, please call our after hours Nurse Triage line [phone]. Thank you.            Follow-ups after your visit        Additional Services     ONC/HEME ADULT REFERRAL       Your provider has referred you to: [facility]: Onc [facility] [phone]   [email or web address]    Please be aware that coverage of these services is subject to the terms and limitations of your health insurance plan.  Call member services at your health plan with any benefit or coverage questions.      Please bring the following with you to your appointment:    (1) Any X-Rays, CTs or MRIs which have been performed.  Contact the facility where they were done to arrange for  prior to your scheduled appointment.   (2) List of current medications  (3) This referral request   (4) Any documents/labs given to you for this referral                  Follow-up notes from your care team      Return in about 2 weeks (around 12/8/2017) for Schedule for chemotherapy as per treatment plan.      Your next 10 appointments already scheduled     Nov 27, 2017  9:00 AM CST   New Visit with Jacob High PsyD   [facility] ([facility])    [facility]  [address]  [facility] [address]   [phone]            Nov 28, 2017  9:30 AM CST   Lymphedema Evaluation with Anna Graham PT   [facility]ema ([facility])    [address] [facility] [address]   [phone]            Dec 07, 2017  8:30 AM CST   New Visit with DELIA Borja   [facility] ([facility])    [address] [place] [address]   [phone]            Dec 07, 2017 11:00 AM CST   Return Visit with Kathy Richards MD   [facility] ([facility])    [facility]  [address]  [facility] [address]   [phone]            Dec 07, 2017 11:30 AM CST   Level 2 with ROOM 3 [facility] ([facility])    [facility]  [address] [place] [address] [address]  [facility] [address]   [phone]            Jan 09, 2018 10:00 AM CST   [facility]    [address] [place] [address]   [phone]              Who to contact     If you have questions or need follow up information about today's clinic visit or your schedule please contact [facility] directly at [phone].  Normal or non-critical lab and imaging results will be communicated to you by MyChart, letter or phone within 4 business days after the clinic has received the results. If you do not hear from us within 7 days, please contact the clinic through MyChart or phone. If you have a critical or abnormal lab result, we will notify you  "by phone as soon as possible.  Submit refill requests through [employer] or call your pharmacy and they will forward the refill request to us. Please allow 3 business days for your refill to be completed.          Additional Information About Your Visit        [employer] Information     [employer] gives you secure access to your electronic health record. If you see a primary care provider, you can also send messages to your care team and make appointments. If you have questions, please call your primary care clinic.  If you do not have a primary care provider, please call [phone] and they will assist you.        Care EveryWhere ID     This is your Care EveryWhere ID. This could be used by other organizations to access your [place] medical records  [id number]        Your Vitals Were     Pulse Temperature Respirations Height Last Period Pulse Oximetry    84 98.2  F (36.8  C) (Oral) 18 1.6 m (5' 3\") 11/02/2017 (Exact Date) 96%    Breastfeeding? BMI (Body Mass Index)                No 36.81 kg/m2           Blood Pressure from Last 3 Encounters:   11/24/17 111/79   11/22/17 104/74   11/18/17 107/70    Weight from Last 3 Encounters:   11/24/17 94.3 kg (207 lb 12.8 oz)   11/22/17 93.9 kg (207 lb)   11/14/17 95.9 kg (211 lb 6.7 oz)              We Performed the Following     ONC/HEME ADULT REFERRAL          Today's Medication Changes          These changes are accurate as of: 11/24/17  1:50 PM.  If you have any questions, ask your nurse or doctor.               These medicines have changed or have updated prescriptions.        Dose/Directions    oxyCODONE IR 5 MG tablet   Commonly known as:  ROXICODONE   This may have changed:  Another medication with the same name was removed. Continue taking this medication, and follow the directions you see here.   Used for:  Cellulitis of chest wall        Dose:  5-15 mg   Take 1-3 tablets (5-15 mg) by mouth every 4 hours as needed for pain maximum 12 tablet(s) per day   Quantity:  40 " tablet   Refills:  0                Primary Care Provider Office Phone # Fax #    Anna Naidu [phone] [address] [facility] [address]        Equal Access to Services     LAVERNEDISHA ROBINA : Nicol josy garnett jodieo Soayeshaali, waaxda luqadaha, qaybta kaalmada ademariah, pancho boxcolin castillo. So [facility] [phone].    ATENCIÓN: Si habla español, tiene a mcdonnell disposición servicios gratuitos de asistencia lingüística. Llame al [phone].    We comply with applicable federal civil rights laws and [place] laws. We do not discriminate on the basis of race, color, national origin, age, disability, sex, sexual orientation, or gender identity.            Thank you!     Thank you for choosing [facility]  for your care. Our goal is always to provide you with excellent care. Hearing back from our patients is one way we can continue to improve our services. Please take a few minutes to complete the written survey that you may receive in the mail after your visit with us. Thank you!             Your Updated Medication List - Protect others around you: Learn how to safely use, store and throw away your medicines at www.disposemymeds.org.          This list is accurate as of: 11/24/17  1:50 PM.  Always use your most recent med list.                   Brand Name Dispense Instructions for use Diagnosis    acetaminophen 325 MG tablet    TYLENOL    100 tablet    Take 2 tablets (650 mg) by mouth every 4 hours as needed for other (mild pain)    Malignant melanoma of left upper extremity including shoulder (H)       aspirin 81 MG EC tablet      Take 81 mg by mouth daily        cephALEXin 500 MG capsule    KEFLEX    24 capsule    Take 2 capsules (1,000 mg) by mouth 2 times daily    Cellulitis of chest wall       LORazepam 1 MG tablet    ATIVAN    30 tablet    Take 1 tablet (1 mg) by mouth every 8 hours as needed for anxiety    Anxiety, Acute intractable tension-type headache        oxyCODONE IR 5 MG tablet    ROXICODONE    40 tablet    Take 1-3 tablets (5-15 mg) by mouth every 4 hours as needed for pain maximum 12 tablet(s) per day    Cellulitis of chest wall       rOPINIRole 0.25 MG tablet    REQUIP    30 tablet    Take 1 tablet (0.25 mg) by mouth At Bedtime    Restless leg syndrome       senna-docusate 8.6-50 MG per tablet    SENOKOT-S;PERICOLACE    60 tablet    Take 1 tablet by mouth 2 times daily as needed for constipation    Malignant melanoma of left upper extremity including shoulder (H)       venlafaxine 150 MG Tb24 24 hr tablet    EFFEXOR-ER    30 each    Take 1 tablet (150 mg) by mouth daily (with breakfast)    Mild major depression (H)       Vitamin D3 3000 UNITS Tabs     30 tablet    Take 3,000 Int'l Units by mouth daily    Vitamin D deficiency       zonisamide 25 MG capsule    Zonegran    90 capsule    Take 1 tablet (25 mg) once daily for 1 week, then 2 tablets once daily for 1 week, then 3 tablets once daily for 1 week, then 4 tablets once daily for 1 week.    Headache disorder

## 2017-11-24 NOTE — LETTER
11/24/2017         RE: Doretha Fernandez  [address]  [place] [address]        Dear Colleague,    Thank you for referring your patient, Doretha Fernandez, to the [facility]. Please see a copy of my visit note below.    Hematology/ Oncology Follow-up Visit:  Nov 24, 2017    Reason for Visit:   Chief Complaint   Patient presents with     Oncology Clinic Visit     2 week post hospital follow up Malignant Melanoma.        Oncologic History:  Malignant melanoma metastatic to axillary lymph nodes status post resection.    Interval History:  Patient is here today to start adjuvant therapy with opdivo. She had an infection at the surgical site. The site has been healing very well. Otherwise the patient denies any bony aches or pains or nausea or vomiting or fever.      Review Of Systems:  Constitutional: Negative for fever, chills, and night sweats.  Skin: negative.  Eyes: negative.  Ears/Nose/Throat: negative.  Respiratory: No shortness of breath, dyspnea on exertion, cough, or hemoptysis.  Cardiovascular: negative.  Gastrointestinal: negative.  Genitourinary: negative.  Musculoskeletal: negative.  Neurologic: negative.  Psychiatric: negative.  Hematologic/Lymphatic/Immunologic: negative.  Endocrine: negative.    All other ROS negative unless mentioned in interval history.    Past medical, social, surgical, and family histories reviewed.    Allergies:  Allergies as of 11/24/2017 - Nicola as Reviewed 11/24/2017   Allergen Reaction Noted     Compazine [prochlorperazine] Fatigue 10/12/2017     Fentanyl Other (See Comments) 10/12/2017     Erythrocin Nausea and Vomiting 03/20/2008     Zithromax [azithromycin dihydrate] Diarrhea 02/17/2012       Current Medications:  Current Outpatient Prescriptions   Medication Sig Dispense Refill     venlafaxine (EFFEXOR-ER) 150 MG TB24 24 hr tablet Take 1 tablet (150 mg) by mouth daily (with breakfast) 30 each 0     cephALEXin (KEFLEX) 500 MG capsule Take 2 capsules (1,000  "mg) by mouth 2 times daily 24 capsule 0     oxyCODONE IR (ROXICODONE) 5 MG tablet Take 1-3 tablets (5-15 mg) by mouth every 4 hours as needed for pain maximum 12 tablet(s) per day 40 tablet 0     aspirin 81 MG EC tablet Take 81 mg by mouth daily       acetaminophen (TYLENOL) 325 MG tablet Take 2 tablets (650 mg) by mouth every 4 hours as needed for other (mild pain) 100 tablet 0     LORazepam (ATIVAN) 1 MG tablet Take 1 tablet (1 mg) by mouth every 8 hours as needed for anxiety 30 tablet 0     Cholecalciferol (VITAMIN D3) 3000 UNITS TABS Take 3,000 Int'l Units by mouth daily 30 tablet 3     order for DME Equipment being ordered: 20-30mmHg compression sleeve and 20-30mmHg compression glove\" x2 pair 1 Units 0     rOPINIRole (REQUIP) 0.25 MG tablet Take 1 tablet (0.25 mg) by mouth At Bedtime (Patient not taking: Reported on 11/24/2017) 30 tablet 11     senna-docusate (SENOKOT-S;PERICOLACE) 8.6-50 MG per tablet Take 1 tablet by mouth 2 times daily as needed for constipation (Patient not taking: Reported on 11/24/2017) 60 tablet 1     zonisamide (ZONEGRAN) 25 MG capsule Take 1 tablet (25 mg) once daily for 1 week, then 2 tablets once daily for 1 week, then 3 tablets once daily for 1 week, then 4 tablets once daily for 1 week. (Patient not taking: Reported on 11/9/2017) 90 capsule 1        Physical Exam:  /79 (BP Location: Right arm, Patient Position: Sitting, Cuff Size: Adult Large)  Pulse 84  Temp 98.2  F (36.8  C) (Oral)  Resp 18  Ht 1.6 m (5' 3\")  Wt 94.3 kg (207 lb 12.8 oz)  LMP 11/02/2017 (Exact Date)  SpO2 96%  Breastfeeding? No  BMI 36.81 kg/m2  Wt Readings from Last 12 Encounters:   11/24/17 94.3 kg (207 lb 12.8 oz)   11/22/17 93.9 kg (207 lb)   11/14/17 95.9 kg (211 lb 6.7 oz)   11/09/17 94.8 kg (208 lb 14.4 oz)   11/02/17 96.1 kg (211 lb 12.8 oz)   11/02/17 95.8 kg (211 lb 4.8 oz)   10/27/17 95.8 kg (211 lb 1.6 oz)   10/23/17 94.5 kg (208 lb 5.4 oz)   10/18/17 94.7 kg (208 lb 12.8 oz)   10/13/17 " 94.6 kg (208 lb 8 oz)   10/12/17 93.9 kg (207 lb)   10/12/17 94 kg (207 lb 4.8 oz)     ECOG performance status: 0  GENERAL APPEARANCE: Healthy, alert and in no acute distress.  HEENT: Sclerae anicteric. PERRLA. Oropharynx without ulcers, lesions, or thrush.  NECK: Supple. No asymmetry or masses.  LYMPHATICS: No palpable cervical, supraclavicular, axillary, or inguinal lymphadenopathy.  RESP: Lungs clear to auscultation bilaterally without rales, rhonchi or wheezes.  CARDIOVASCULAR: Regular rate and rhythm. Normal S1, S2; no S3 or S4. No murmur, gallop, or rub.  ABDOMEN: Soft, nontender. Bowel sounds normal. No palpable organomegaly or masses.  MUSCULOSKELETAL: Extremities without gross deformities noted. No edema of bilateral lower extremities.  SKIN: No suspicious lesions or rashes.  NEURO: Alert and oriented x 3. Cranial nerves II-XII grossly intact.  PSYCHIATRIC: Mentation and affect appear normal.    Laboratory/Imaging Studies:  Office Visit on 11/22/2017   Component Date Value Ref Range Status     Specimen Description 11/22/2017 Feces   Final     C Diff Toxin B PCR 11/22/2017 Negative  NEG^Negative Final    Comment: Negative: Clostridium difficile target DNA sequences NOT detected, presumed   negative for Clostridium difficile toxin B or the number of bacteria present   may be below the limit of detection for the test.  FDA approved assay performed using [employer] Gene[employer]pert real-time PCR.  A negative result does not exclude actual disease due to Clostridium difficile   and may be due to improper collection, handling and storage of the specimen   or the number of organisms in the specimen is below the detection limit of the   assay.          Recent Results (from the past 744 hour(s))   MR Brain w/o & w Contrast    Narrative    MR BRAIN WITHOUT AND WITH CONTRAST  11/6/2017 9:08 AM     HISTORY:  Melanoma.    TECHNIQUE: Multiplanar, multisequence MRI of the brain without and  with 9 mL Gadavist IV contrast  material.    COMPARISON: CT dated 9/12/2017. MR scan dated 4/1/2017. Exam dated  3/26/2008.    FINDINGS:  Again noted is an area of T2 signal hyperintensity in the  cortex and subcortical white matter of the right posterior parietal  region. This is stable.  There is no evidence of hemorrhage, mass,  acute infarct, or anomaly. There is a small developmental venous  anomaly in the right corona radiata. This is unchanged compared to  prior scans. This is usually an incidental finding.  The facial  structures appear normal.  The arteries at the base of the brain and  the dural venous sinuses appear patent.      Impression    IMPRESSION:   1. No metastatic lesions are identified. No change.  2. No change in the cortical and subcortical signal abnormality in the  right parietal region. This finding is stable when compared to MR  scans going back to 2008. Etiology uncertain. It is possible it is due  to an old ischemic event. It could also be due to a neuronal migration  anomaly or a cortical dysplasia.  3. Again noted is an incidental developmental venous anomaly in the  right corona radiata.     JD HAYES MD   US Extremity Non Vascular Left    Narrative    US EXTREMITY NON VASCULAR LEFT 11/14/2017 8:55 PM     HISTORY: Left axillary cellulitis with concerns for abscess.      FINDINGS: Ultrasound in the area of patient's cellulitis left axillary  region is performed. A small complex appearing fluid collection is  noted in the area of patient's cellulitis concerning for abscess  measuring 2.0 x 0.8 x 1.0 cm.      Impression    IMPRESSION: Small abscess left axilla in area of patient's cellulitis.    ALEX RIVERA MD       Assessment and plan:    (C79.9) Metastatic malignant melanoma (H)  (primary encounter diagnosis)  We talked about recent data of immunotherapy. We also talked about interferon as well. I would recommend use of immunotherapy with Nivolumab iat 3 mg/kg infusion over 60 minutes every 2 weeks for 1 year.. I  reviewed with the patient today the rationale of proceeding with that drug versus potential side effects which include immune mediated pneumonitis, immune mediated colitis, immune mediated hepatitis, immune mediated nephritis with renal dysfunction immune mediated hypothyroidism or hyperthyroidism . Patient will start today opdivo according to the treatment plan today. I will see the patient again in 2 weeks' time or sooner if there are new developments or concerns.  (F32.0) Mild major depression (H)  Patient currently on Effexor- mg every 24 hours. We will refer the patient to psychology for further evaluation.    The patient is ready to learn, no apparent learning barriers were identified.  Diagnosis and treatment plans were explained to the patient. The patient expressed understanding of the content. The patient asked appropriate questions. The patient questions were answered to her satisfaction.    Chart documentation with Dragon Voice recognition Software. Although reviewed after completion, some words and grammatical errors may remain.    Again, thank you for allowing me to participate in the care of your patient.        Sincerely,        Kathy Richards MD

## 2017-11-24 NOTE — PATIENT INSTRUCTIONS
Dr. Richards would like to refer you to Dr. High and for you to continue with Opdivo today as planned. We would like to see you back in 2 weeks with next treatment. When you are in need of a refill, please call your pharmacy and they will send us a request.  Copy of appointments, and after visit summary (AVS) given to patient.  If you have any questions during business hours (M-F 8 AM- 4PM), please call Jeanie Keenan RN, BSN, OCN Oncology Hematology /Breast Cancer Navigator at [facility] [phone].   For questions after business hours, or on holidays/weekends, please call our after hours Nurse Triage line [phone]. Thank you.

## 2017-11-27 ENCOUNTER — ONCOLOGY VISIT (OUTPATIENT)
Dept: ONCOLOGY | Facility: CLINIC | Age: 42
End: 2017-11-27
Attending: PSYCHOLOGIST
Payer: COMMERCIAL

## 2017-11-27 DIAGNOSIS — F41.1 GAD (GENERALIZED ANXIETY DISORDER): Primary | ICD-10-CM

## 2017-11-27 PROCEDURE — 90791 PSYCH DIAGNOSTIC EVALUATION: CPT | Performed by: PSYCHOLOGIST

## 2017-11-27 NOTE — LETTER
11/27/2017         RE: Doretha Fernandez  [address] [facility] [address] [facility]  [place] [address]        Dear Colleague,    Thank you for referring your patient, Doretha Fernandez, to the [facility] [place] [facility]. Please see a copy of my visit note below.    Confidential Summary of Oncology Psychology Initial Visit    Doretha Fernandez is a 42 year old female who was referred by Dr. Richards for  Anxiety  The patient was seen for an initial 45 minute evaluation on 11/27/2017.    Presenting Concerns: Primary complaint was anxiety. She reported intrusive cancer thoughts, intrusive thoughts of death, psychomotor agitation, trouble sleeping, trouble concentrating, excessive worry, and irritability. She has experienced anxiety for most of her life but it has been getting worse following her diagnosis of cancer.     Mental Status/Interview:   Orientation: Doretha Fernandez was alert, attentive, and oriented to time, place, and person  Affect: Affect was appropriate to the situation and showed normal range and stability.  Speech: Speech was clear with normal fluency, rate, tone, and volume.  Memory: Although not formally assessed, immediate, recent, and remote memory appeared to be intact.   Insight and Judgement: Doretha Fernandez demonstrates adequate awareness of the issues discussed and was able to come to reasonable conclusions.  Thought: Thought patterns were coherent and logical. Hallucinations were denied and delusions were not evident.   Lethality: Doretha Fernandez denied suicidal or homicidal ideation or intention.        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 She cared for her mother who passed from cancer when Doretha was 19 yo. Doretha has worried about getting cancer since that time and her diagnosis now increases her cancer and death cognitions. Much of this appointment was spent reminder her to not hold her breath and giving her tools to change her cognitions. She was encouraged to return for  additional appointments.     Diagnosis:   Encounter Diagnosis   Name Primary?     SANDRA (generalized anxiety disorder) Yes     Recommendations/Plan:  1. Use the tools discussed today  2. Return for follow-up.     Thank you for this opportunity to participate in your care of this patient.    Kamari Sousa.MELISSA., L.P.  Director, Oncology Supportive Care     Again, thank you for allowing me to participate in the care of your patient.        Sincerely,        Jacob High PsyD

## 2017-11-27 NOTE — PROGRESS NOTES
Confidential Summary of Oncology Psychology Initial Visit    Doretha Fernandez is a 42 year old female who was referred by Dr. Richards for  Anxiety  The patient was seen for an initial 45 minute evaluation on 11/27/2017.    Presenting Concerns: Primary complaint was anxiety. She reported intrusive cancer thoughts, intrusive thoughts of death, psychomotor agitation, trouble sleeping, trouble concentrating, excessive worry, and irritability. She has experienced anxiety for most of her life but it has been getting worse following her diagnosis of cancer.     Mental Status/Interview:   Orientation: Doretha Fernandez was alert, attentive, and oriented to time, place, and person  Affect: Affect was appropriate to the situation and showed normal range and stability.  Speech: Speech was clear with normal fluency, rate, tone, and volume.  Memory: Although not formally assessed, immediate, recent, and remote memory appeared to be intact.   Insight and Judgement: Doretha Fernandez demonstrates adequate awareness of the issues discussed and was able to come to reasonable conclusions.  Thought: Thought patterns were coherent and logical. Hallucinations were denied and delusions were not evident.   Lethality: Doretha Fernandez denied suicidal or homicidal ideation or intention.        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 She cared for her mother who passed from cancer when Doretha was 21 yo. Doretha has worried about getting cancer since that time and her diagnosis now increases her cancer and death cognitions. Much of this appointment was spent reminder her to not hold her breath and giving her tools to change her cognitions. She was encouraged to return for additional appointments.     Diagnosis:   Encounter Diagnosis   Name Primary?     SANDRA (generalized anxiety disorder) Yes     Recommendations/Plan:  1. Use the tools discussed today  2. Return for follow-up.     Thank you for this opportunity to  participate in your care of this patient.    Jacob High Psy.D., L.P.  Director, Oncology Supportive Care

## 2017-11-28 ENCOUNTER — HOSPITAL ENCOUNTER (OUTPATIENT)
Dept: PHYSICAL THERAPY | Facility: CLINIC | Age: 42
Setting detail: THERAPIES SERIES
End: 2017-11-28
Attending: PHYSICIAN ASSISTANT
Payer: COMMERCIAL

## 2017-11-28 DIAGNOSIS — I89.0 LYMPHEDEMA OF LEFT ARM: Primary | ICD-10-CM

## 2017-11-28 PROCEDURE — 97140 MANUAL THERAPY 1/> REGIONS: CPT | Mod: GP | Performed by: PHYSICAL THERAPIST

## 2017-11-28 PROCEDURE — 97161 PT EVAL LOW COMPLEX 20 MIN: CPT | Mod: GP | Performed by: PHYSICAL THERAPIST

## 2017-11-28 PROCEDURE — 40000099 ZZH STATISTIC LYMPHEDEMA VISIT: Performed by: PHYSICAL THERAPIST

## 2017-11-28 PROCEDURE — 97110 THERAPEUTIC EXERCISES: CPT | Mod: GP | Performed by: PHYSICAL THERAPIST

## 2017-11-28 NOTE — PROGRESS NOTES
"  LYMPHEDEMA / EDEMA REHAB EVALUATION  [facility]     Patient: Doretha Fernandez  : 1975  Referring Provider: Jessica Castellano PA-C    Visit Type  Type of visit: Initial Edema Evaluation    Discipline  Discipline: PT       present: No     General Information   Start of care: 17   Orders: Evaluate and treat as indicated    Order date: 17   Medical diagnosis: LUE lymphedema; L-breast/chest and under L-axilla   Onset of illness / date of surgery: 10/23/17   Edema onset: 10/23/17   Affected body parts: LUE   Edema etiology: Cancer with lymph node dissection, Surgery, Infection, Chemo   Location - Cancer with lymph node dissection:  + from L-axilla         Chemotherapy comments: current immunotherapy; not chemo   Edema etiology comments: s/p radical L-axillary LN dissection ( positive) for metastatic melanoma on 10/23/17 with drain removal on 17; pt was admitted to the hospital from  -  due to sepsis 2/2 post-op wound infection in L-axilla (cellulitis) and was put on oral antibiotics [id number] which pt has finished   Pertinent history of current problem (PT: include personal factors and/or comorbidities that impact the POC; OT: include additional occupational profile info): edema worsens during the day; sleeps with arm elevated on pillows and has started massaging up toward the heart per her RN friend's instruction   Surgical / medical history reviewed: Yes   Edema special tests:  (no history of LUE DVT; has post-thrombo mutation)       Prior treatment:  (none)       Patient role / employment history:  (time off until ; types/writes and uses arm)       [facility]   Living environment comments: with family   Current assistive devices:  (none)       General observations: pt is R-handed but uses BUEs at work a lot    Fall Screening   denies any falls     Precautions   Precautions comments: \"no more cancer\" per " pt from MD; immunotherapy only at this time    Patient / Family Goals  Patient / family goals statement: to learn how to contol the swelling in my arm     Pain   3/10; constant pain; pt reports having a high pain tolerance     Vital Signs  Vital signs: Pulse, SPO2  Pulse: 101bpm   SPO2: 96%     Cognitive Status  Orientation: Orientation to person, place and time  Level of consciousness: Alert  Follows commands and answers questions: 100% of the time  Personal safety and judgement: Intact  Memory: Intact     Edema Exam / Assessment  Skin condition: Non-pitting, Intact  Skin condition comments: LUE skin all intact with scar present just distal to L-axilla that is slightly raised and hard, mild fibrosis around entire scar, non-pitting edema palpated in entire LUE from hand to axilla and mild in L-upper posterior back; L-breast itself is diza and firmer upon palpation vs R-breast      Scar: Yes (see above)  Location: just distal to L-axilla  Mobility: fair; slightly raised and slightly hardened     Ulceration: No       Radial pulse: Symmetrical      Stemmer sign: Negative     Girth Measurements  Girth Measurements: Refer to separate girth measurement flowsheet    Volume UE  Right UE (mL): 2064.7  Left UE (mL): 2070.47  UE volume comparison: LUE volume greater than RUE volume  % difference: 0.3%    Range of Motion  ROM: Other  ROM comments: LUE shoulder ROM significantly impaired; unable to obtain 90 degrees active GH flexion or abduction due to tightness    Strength   Strength comments: functional, but LUE GH flexion and abduction 3- to 2+/5 due to limited available range    Posture  Posture: Normal     Palpation  Palpation: LUE slightly tender with palpation especially at upper posterior arm    Activities of Daily Living  Activities of Daily Living: Independent, no AD for ambulation; unable to perform overhead activities or ADLs due to limited LUE shoulder ROM      Sensory  Sensory perception: Light touch  Light  touch: Intact   Sensory perception comments: numbess at upper posterior arm and elbow    Coordination  Coordination: Gross motor coordination appropriate     Muscle Tone  Muscle tone: No deficits were identified       Planned Edema Interventions  Planned edema interventions: Manual lymph drainage, Gradient compression bandaging, Fit for compression garment, Exercises, Precautions to prevent infection / exacerbation, Education, Manual therapy, Skin care / precautions, Scar mobilization, Soft tissue mobilization, Myofascial release, Home management program development       Clinical Impression  Criteria for skilled therapeutic intervention met: Yes  Therapy diagnosis: LUE lymphedema and impaired ROM     Influenced by the following impairments / conditions: Stage 1  Functional limitations due to impairments / conditions: unable to perform overhead reaching activities or ADLs; discomfort and tenderness in LUE         Treatment frequency: 2 times / week  Treatment duration: 8 weeks  Patient / family and/or staff in agreement with plan of care: Yes  Risks and benefits of therapy have been explained: Yes       Education Assessment  Preferred learning style: Listening  Barriers to learning:  (high anxiety)    Edema Goals  Goal 1  Goal identifier: stg  Goal description: pt to be independent with self-MLD of LUE for decreased edema reduction response and improved comfort with use  Target date: 12/12/17       Goal 2  Goal identifier: stg  Goal description: pt to be independent with donning, doffing and care of compression sleeve and glove for LUE management during upcoming flight/vacation  Target date: 12/12/17       Goal 3  Goal identifier: ltg  Goal description: pt to have increased LUE GH flexion and abduction to 130 degrees to increase use of LUE for overhead activities and ADLs  Target date: 01/27/18       Goal 4  Goal identifier: ltg  Goal description: pt to be independent with LUE lymphedema management via HEP,  elevation, compression garment wear and self-MLD  Target date: 01/27/18       Goal 5  Goal identifier: ltg  Goal description: pt to have at least 8 point improvement on LLIS due to decreased lymphedema and increased comfort and ROM in LUE  Target date: 01/27/18       Total Evaluation Time  Total evaluation time: 10

## 2017-11-28 NOTE — PROGRESS NOTES
Hematology/ Oncology Follow-up Visit:  Nov 24, 2017    Reason for Visit:   Chief Complaint   Patient presents with     Oncology Clinic Visit     2 week post hospital follow up Malignant Melanoma.        Oncologic History:  Malignant melanoma metastatic to axillary lymph nodes status post resection.    Interval History:  Patient is here today to start adjuvant therapy with opdivo. She had an infection at the surgical site. The site has been healing very well. Otherwise the patient denies any bony aches or pains or nausea or vomiting or fever.      Review Of Systems:  Constitutional: Negative for fever, chills, and night sweats.  Skin: negative.  Eyes: negative.  Ears/Nose/Throat: negative.  Respiratory: No shortness of breath, dyspnea on exertion, cough, or hemoptysis.  Cardiovascular: negative.  Gastrointestinal: negative.  Genitourinary: negative.  Musculoskeletal: negative.  Neurologic: negative.  Psychiatric: negative.  Hematologic/Lymphatic/Immunologic: negative.  Endocrine: negative.    All other ROS negative unless mentioned in interval history.    Past medical, social, surgical, and family histories reviewed.    Allergies:  Allergies as of 11/24/2017 - Nicola as Reviewed 11/24/2017   Allergen Reaction Noted     Compazine [prochlorperazine] Fatigue 10/12/2017     Fentanyl Other (See Comments) 10/12/2017     Erythrocin Nausea and Vomiting 03/20/2008     Zithromax [azithromycin dihydrate] Diarrhea 02/17/2012       Current Medications:  Current Outpatient Prescriptions   Medication Sig Dispense Refill     venlafaxine (EFFEXOR-ER) 150 MG TB24 24 hr tablet Take 1 tablet (150 mg) by mouth daily (with breakfast) 30 each 0     cephALEXin (KEFLEX) 500 MG capsule Take 2 capsules (1,000 mg) by mouth 2 times daily 24 capsule 0     oxyCODONE IR (ROXICODONE) 5 MG tablet Take 1-3 tablets (5-15 mg) by mouth every 4 hours as needed for pain maximum 12 tablet(s) per day 40 tablet 0     aspirin 81 MG EC tablet Take 81 mg by mouth  "daily       acetaminophen (TYLENOL) 325 MG tablet Take 2 tablets (650 mg) by mouth every 4 hours as needed for other (mild pain) 100 tablet 0     LORazepam (ATIVAN) 1 MG tablet Take 1 tablet (1 mg) by mouth every 8 hours as needed for anxiety 30 tablet 0     Cholecalciferol (VITAMIN D3) 3000 UNITS TABS Take 3,000 Int'l Units by mouth daily 30 tablet 3     order for DME Equipment being ordered: 20-30mmHg compression sleeve and 20-30mmHg compression glove\" x2 pair 1 Units 0     rOPINIRole (REQUIP) 0.25 MG tablet Take 1 tablet (0.25 mg) by mouth At Bedtime (Patient not taking: Reported on 11/24/2017) 30 tablet 11     senna-docusate (SENOKOT-S;PERICOLACE) 8.6-50 MG per tablet Take 1 tablet by mouth 2 times daily as needed for constipation (Patient not taking: Reported on 11/24/2017) 60 tablet 1     zonisamide (ZONEGRAN) 25 MG capsule Take 1 tablet (25 mg) once daily for 1 week, then 2 tablets once daily for 1 week, then 3 tablets once daily for 1 week, then 4 tablets once daily for 1 week. (Patient not taking: Reported on 11/9/2017) 90 capsule 1        Physical Exam:  /79 (BP Location: Right arm, Patient Position: Sitting, Cuff Size: Adult Large)  Pulse 84  Temp 98.2  F (36.8  C) (Oral)  Resp 18  Ht 1.6 m (5' 3\")  Wt 94.3 kg (207 lb 12.8 oz)  LMP 11/02/2017 (Exact Date)  SpO2 96%  Breastfeeding? No  BMI 36.81 kg/m2  Wt Readings from Last 12 Encounters:   11/24/17 94.3 kg (207 lb 12.8 oz)   11/22/17 93.9 kg (207 lb)   11/14/17 95.9 kg (211 lb 6.7 oz)   11/09/17 94.8 kg (208 lb 14.4 oz)   11/02/17 96.1 kg (211 lb 12.8 oz)   11/02/17 95.8 kg (211 lb 4.8 oz)   10/27/17 95.8 kg (211 lb 1.6 oz)   10/23/17 94.5 kg (208 lb 5.4 oz)   10/18/17 94.7 kg (208 lb 12.8 oz)   10/13/17 94.6 kg (208 lb 8 oz)   10/12/17 93.9 kg (207 lb)   10/12/17 94 kg (207 lb 4.8 oz)     ECOG performance status: 0  GENERAL APPEARANCE: Healthy, alert and in no acute distress.  HEENT: Sclerae anicteric. PERRLA. Oropharynx without ulcers, " lesions, or thrush.  NECK: Supple. No asymmetry or masses.  LYMPHATICS: No palpable cervical, supraclavicular, axillary, or inguinal lymphadenopathy.  RESP: Lungs clear to auscultation bilaterally without rales, rhonchi or wheezes.  CARDIOVASCULAR: Regular rate and rhythm. Normal S1, S2; no S3 or S4. No murmur, gallop, or rub.  ABDOMEN: Soft, nontender. Bowel sounds normal. No palpable organomegaly or masses.  MUSCULOSKELETAL: Extremities without gross deformities noted. No edema of bilateral lower extremities.  SKIN: No suspicious lesions or rashes.  NEURO: Alert and oriented x 3. Cranial nerves II-XII grossly intact.  PSYCHIATRIC: Mentation and affect appear normal.    Laboratory/Imaging Studies:  Office Visit on 11/22/2017   Component Date Value Ref Range Status     Specimen Description 11/22/2017 Feces   Final     C Diff Toxin B PCR 11/22/2017 Negative  NEG^Negative Final    Comment: Negative: Clostridium difficile target DNA sequences NOT detected, presumed   negative for Clostridium difficile toxin B or the number of bacteria present   may be below the limit of detection for the test.  FDA approved assay performed using [employer] GeneXpert real-time PCR.  A negative result does not exclude actual disease due to Clostridium difficile   and may be due to improper collection, handling and storage of the specimen   or the number of organisms in the specimen is below the detection limit of the   assay.          Recent Results (from the past 744 hour(s))   MR Brain w/o & w Contrast    Narrative    MR BRAIN WITHOUT AND WITH CONTRAST  11/6/2017 9:08 AM     HISTORY:  Melanoma.    TECHNIQUE: Multiplanar, multisequence MRI of the brain without and  with 9 mL Gadavist IV contrast material.    COMPARISON: CT dated 9/12/2017. MR scan dated 4/1/2017. Exam dated  3/26/2008.    FINDINGS:  Again noted is an area of T2 signal hyperintensity in the  cortex and subcortical white matter of the right posterior parietal  region. This  is stable.  There is no evidence of hemorrhage, mass,  acute infarct, or anomaly. There is a small developmental venous  anomaly in the right corona radiata. This is unchanged compared to  prior scans. This is usually an incidental finding.  The facial  structures appear normal.  The arteries at the base of the brain and  the dural venous sinuses appear patent.      Impression    IMPRESSION:   1. No metastatic lesions are identified. No change.  2. No change in the cortical and subcortical signal abnormality in the  right parietal region. This finding is stable when compared to MR  scans going back to 2008. Etiology uncertain. It is possible it is due  to an old ischemic event. It could also be due to a neuronal migration  anomaly or a cortical dysplasia.  3. Again noted is an incidental developmental venous anomaly in the  right corona radiata.     JD HAYES MD   US Extremity Non Vascular Left    Narrative    US EXTREMITY NON VASCULAR LEFT 11/14/2017 8:55 PM     HISTORY: Left axillary cellulitis with concerns for abscess.      FINDINGS: Ultrasound in the area of patient's cellulitis left axillary  region is performed. A small complex appearing fluid collection is  noted in the area of patient's cellulitis concerning for abscess  measuring 2.0 x 0.8 x 1.0 cm.      Impression    IMPRESSION: Small abscess left axilla in area of patient's cellulitis.    ALEX RIVERA MD       Assessment and plan:    (C79.9) Metastatic malignant melanoma (H)  (primary encounter diagnosis)  We talked about recent data of immunotherapy. We also talked about interferon as well. I would recommend use of immunotherapy with Nivolumab iat 3 mg/kg infusion over 60 minutes every 2 weeks for 1 year.. I reviewed with the patient today the rationale of proceeding with that drug versus potential side effects which include immune mediated pneumonitis, immune mediated colitis, immune mediated hepatitis, immune mediated nephritis with renal dysfunction  immune mediated hypothyroidism or hyperthyroidism . Patient will start today opdivo according to the treatment plan today. I will see the patient again in 2 weeks' time or sooner if there are new developments or concerns.  (F32.0) Mild major depression (H)  Patient currently on Effexor- mg every 24 hours. We will refer the patient to psychology for further evaluation.    The patient is ready to learn, no apparent learning barriers were identified.  Diagnosis and treatment plans were explained to the patient. The patient expressed understanding of the content. The patient asked appropriate questions. The patient questions were answered to her satisfaction.    Chart documentation with Dragon Voice recognition Software. Although reviewed after completion, some words and grammatical errors may remain.

## 2017-11-30 ENCOUNTER — HOSPITAL ENCOUNTER (OUTPATIENT)
Dept: PHYSICAL THERAPY | Facility: CLINIC | Age: 42
Setting detail: THERAPIES SERIES
End: 2017-11-30
Attending: PHYSICIAN ASSISTANT
Payer: COMMERCIAL

## 2017-11-30 PROCEDURE — 97140 MANUAL THERAPY 1/> REGIONS: CPT | Mod: GP | Performed by: REHABILITATION PRACTITIONER

## 2017-11-30 PROCEDURE — 40000099 ZZH STATISTIC LYMPHEDEMA VISIT: Performed by: REHABILITATION PRACTITIONER

## 2017-12-04 ENCOUNTER — APPOINTMENT (OUTPATIENT)
Dept: ULTRASOUND IMAGING | Facility: CLINIC | Age: 42
End: 2017-12-04
Attending: PHYSICIAN ASSISTANT
Payer: COMMERCIAL

## 2017-12-04 ENCOUNTER — HOSPITAL ENCOUNTER (OUTPATIENT)
Dept: PHYSICAL THERAPY | Facility: CLINIC | Age: 42
Setting detail: THERAPIES SERIES
End: 2017-12-04
Attending: PHYSICIAN ASSISTANT
Payer: COMMERCIAL

## 2017-12-04 ENCOUNTER — APPOINTMENT (OUTPATIENT)
Dept: ONCOLOGY | Facility: CLINIC | Age: 42
End: 2017-12-04
Attending: PSYCHOLOGIST
Payer: COMMERCIAL

## 2017-12-04 ENCOUNTER — TELEPHONE (OUTPATIENT)
Dept: FAMILY MEDICINE | Facility: CLINIC | Age: 42
End: 2017-12-04

## 2017-12-04 ENCOUNTER — HOSPITAL ENCOUNTER (EMERGENCY)
Facility: CLINIC | Age: 42
Discharge: HOME | End: 2017-12-04
Attending: PHYSICIAN ASSISTANT | Admitting: PHYSICIAN ASSISTANT
Payer: COMMERCIAL

## 2017-12-04 VITALS
HEART RATE: 77 BPM | OXYGEN SATURATION: 95 % | RESPIRATION RATE: 16 BRPM | DIASTOLIC BLOOD PRESSURE: 96 MMHG | TEMPERATURE: 97.8 F | SYSTOLIC BLOOD PRESSURE: 130 MMHG

## 2017-12-04 DIAGNOSIS — Z51.89 VISIT FOR WOUND CHECK: ICD-10-CM

## 2017-12-04 LAB
ANION GAP SERPL CALCULATED.3IONS-SCNC: 7 MMOL/L (ref 3–14)
BASOPHILS # BLD AUTO: 0 10E9/L (ref 0–0.2)
BASOPHILS NFR BLD AUTO: 0.7 %
BUN SERPL-MCNC: 11 MG/DL (ref 7–30)
CALCIUM SERPL-MCNC: 9 MG/DL (ref 8.5–10.1)
CHLORIDE SERPL-SCNC: 104 MMOL/L (ref 94–109)
CO2 SERPL-SCNC: 26 MMOL/L (ref 20–32)
CREAT SERPL-MCNC: 0.72 MG/DL (ref 0.52–1.04)
DIFFERENTIAL METHOD BLD: ABNORMAL
EOSINOPHIL # BLD AUTO: 0.3 10E9/L (ref 0–0.7)
EOSINOPHIL NFR BLD AUTO: 5.1 %
ERYTHROCYTE [DISTWIDTH] IN BLOOD BY AUTOMATED COUNT: 12.9 % (ref 10–15)
GFR SERPL CREATININE-BSD FRML MDRD: 89 ML/MIN/1.7M2
GLUCOSE SERPL-MCNC: 82 MG/DL (ref 70–99)
HCT VFR BLD AUTO: 36.3 % (ref 35–47)
HGB BLD-MCNC: 11.6 G/DL (ref 11.7–15.7)
IMM GRANULOCYTES # BLD: 0 10E9/L (ref 0–0.4)
IMM GRANULOCYTES NFR BLD: 0.2 %
LYMPHOCYTES # BLD AUTO: 2.3 10E9/L (ref 0.8–5.3)
LYMPHOCYTES NFR BLD AUTO: 37.4 %
MCH RBC QN AUTO: 27.5 PG (ref 26.5–33)
MCHC RBC AUTO-ENTMCNC: 32 G/DL (ref 31.5–36.5)
MCV RBC AUTO: 86 FL (ref 78–100)
MONOCYTES # BLD AUTO: 0.5 10E9/L (ref 0–1.3)
MONOCYTES NFR BLD AUTO: 7.8 %
NEUTROPHILS # BLD AUTO: 3 10E9/L (ref 1.6–8.3)
NEUTROPHILS NFR BLD AUTO: 48.8 %
PLATELET # BLD AUTO: 331 10E9/L (ref 150–450)
POTASSIUM SERPL-SCNC: 3.9 MMOL/L (ref 3.4–5.3)
RBC # BLD AUTO: 4.22 10E12/L (ref 3.8–5.2)
SODIUM SERPL-SCNC: 137 MMOL/L (ref 133–144)
WBC # BLD AUTO: 6.1 10E9/L (ref 4–11)

## 2017-12-04 PROCEDURE — 80048 BASIC METABOLIC PNL TOTAL CA: CPT | Performed by: PHYSICIAN ASSISTANT

## 2017-12-04 PROCEDURE — 40000099 ZZH STATISTIC LYMPHEDEMA VISIT: Performed by: REHABILITATION PRACTITIONER

## 2017-12-04 PROCEDURE — 76882 US LMTD JT/FCL EVL NVASC XTR: CPT | Mod: LT

## 2017-12-04 PROCEDURE — 97140 MANUAL THERAPY 1/> REGIONS: CPT | Mod: GP | Performed by: REHABILITATION PRACTITIONER

## 2017-12-04 PROCEDURE — 85025 COMPLETE CBC W/AUTO DIFF WBC: CPT | Performed by: PHYSICIAN ASSISTANT

## 2017-12-04 PROCEDURE — 99283 EMERGENCY DEPT VISIT LOW MDM: CPT | Mod: Z6 | Performed by: PHYSICIAN ASSISTANT

## 2017-12-04 PROCEDURE — 99283 EMERGENCY DEPT VISIT LOW MDM: CPT

## 2017-12-04 NOTE — TELEPHONE ENCOUNTER
"Reason for call:  Patient reporting a symptom    Symptom or request: Patient is calling as she is currently waiting at [facility]. She went to her lymphedema therapy appointment this morning. She was told she has fluid on her breast. She would like to be fit in by Dr. Naidu today. Patient states she is fed up with her care. She \"has had no one overseeing her care as she's had so much happen to her the last month.\" Patient states she has been treating horribly. Please advise.    Duration (how long have symptoms been present): ongoing     Have you been treated for this before? Yes    Additional comments: none    Phone Number patient can be reached at:  Home number on file [phone] (home)    Best Time:  any    Can we leave a detailed message on this number:  YES    Call taken on 12/4/2017 at 11:13 AM by Sonam Cortes    "

## 2017-12-04 NOTE — TELEPHONE ENCOUNTER
MEENA to call clinic/RN.  Checking how she is doing, if she was taken care of in the ER and ER f/u with .  Michael

## 2017-12-04 NOTE — ED PROVIDER NOTES
History     Chief Complaint   Patient presents with     Wound Check     cellulitis 2 weeks ago under L axcillary and L breast. Redness and fluid build-up noticed yesterday. Here for recheck and concern of returning infection     HPI  Doretha Fernandez is a 42 year old female who presented to the emergency department with concerns over and check.  Patient had a recent diagnosis of melanoma with left axilla lymph node biopsy on 10/23/17 which was complicated by secondary cellulitis/abscess with sepsis which required hospitalization, IV antibiotics for four days on 11/14/17 which was followed by Keflex 1000 mg twice daily for 6 days.  She finished antibiotics as directed.   Since then she has been undergoing lymphedema therapy.  In last 24 hours she noted increasing swelling/fullness along the left breast near her prior incision site.  She denies any significant pain associated with this.  She has not had any fever, chills, myalgias, cough, dyspnea, wheezing, discharge from the wound.  She has been taking Tylenol to 3000 mg per day since her surgery which has not changed.  She states that she is going on vacation later this week and just wants to make sure there is no evidence of infection she states travel.    Problem List:    Patient Active Problem List    Diagnosis Date Noted     Sepsis due to cellulitis (H) 11/14/2017     Priority: Medium     Melanoma (H) 10/23/2017     Priority: Medium     Malignant melanoma of left upper extremity including shoulder (H) 10/12/2017     Priority: Medium     Metastatic malignant melanoma (H) 10/10/2017     Priority: Medium     Prothrombin mutation (H) 04/05/2017     Priority: Medium     On daily aspirin 81 mg per hematology's recommendations from 2008       Vision changes 04/01/2017     Priority: Medium     Acute non intractable tension-type headache 04/01/2017     Priority: Medium     Mild intermittent asthma without complication 11/08/2013     Priority: Medium     Mild major  depression (H) 2013     Priority: Medium     Anxiety state 2012     Priority: Medium     Problem list name updated by automated process. Provider to review       Esophageal reflux 2012     Priority: Medium     DUB (dysfunctional uterine bleeding) 2011     Priority: Medium     CARDIOVASCULAR SCREENING; LDL GOAL LESS THAN 160 2011     Priority: Low      Past Medical History:    Past Medical History:   Diagnosis Date     Abnormal MRI      Anxiety      Depression      Lumbago      Malignant melanoma (H)      Mild persistent asthma      Prothrombin deficiency (H)      Stroke (cerebrum) (H)      TIA (transient ischemic attack)      Past Surgical History:    Past Surgical History:   Procedure Laterality Date     COLONOSCOPY N/A 10/18/2017    Procedure: COLONOSCOPY;  Colon;  Surgeon: Debbie Stephens MD;  Location: [facility] OR     DISSECT LYMPH NODE AXILLA Left 10/23/2017    Procedure: DISSECT LYMPH NODE AXILLA;  Left Axillary Lymph Node Dissection ;  Surgeon: Laurent Cool MD;  Location: [facility] OR     GYN SURGERY           REPAIR MOHS Left 2017    Procedure: REPAIR MOHS;  Left Upper Lid Moh's Reconstruction;  Surgeon: Kisha Bosch MD;  Location: [facility] OR     Family History:    Family History   Problem Relation Age of Onset     CANCER Mother 45     lung     Neurologic Disorder Mother      Lipids Father      GASTROINTESTINAL DISEASE Father      Depression Father      CANCER Maternal Grandmother      Blood Disease Maternal Grandmother      Arthritis Maternal Grandmother      DIABETES Maternal Grandmother      Depression Maternal Grandmother      Macular Degeneration Maternal Grandmother      Glaucoma Maternal Grandmother      DIABETES Maternal Grandfather      CEREBROVASCULAR DISEASE Maternal Grandfather      Blood Disease Maternal Grandfather      HEART DISEASE Maternal Grandfather      Glaucoma Maternal Grandfather      CANCER Paternal Grandmother      Cancer - colorectal  Paternal Grandmother      Respiratory Paternal Grandfather      Blood Disease Paternal Grandfather      HEART DISEASE Daughter      Asthma Daughter      Depression Sister      Social History:  Marital Status:   [4]  Social History   Substance Use Topics     Smoking status: Passive Smoke Exposure - Never Smoker     Last attempt to quit: 3/20/1998     Smokeless tobacco: Never Used     Alcohol use No      Comment: occ      Medications:      order for DME   rOPINIRole (REQUIP) 0.25 MG tablet   venlafaxine (EFFEXOR-ER) 150 MG TB24 24 hr tablet   cephALEXin (KEFLEX) 500 MG capsule   oxyCODONE IR (ROXICODONE) 5 MG tablet   aspirin 81 MG EC tablet   acetaminophen (TYLENOL) 325 MG tablet   senna-docusate (SENOKOT-S;PERICOLACE) 8.6-50 MG per tablet   LORazepam (ATIVAN) 1 MG tablet   Cholecalciferol (VITAMIN D3) 3000 UNITS TABS   zonisamide (ZONEGRAN) 25 MG capsule     Review of Systems  CONSTITUTIONAL:NEGATIVE for fever, chills, change in weight  INTEGUMENTARY/SKIN: NEGATIVE for worrisome rashes, moles or lesions  ENT/MOUTH: NEGATIVE for ear, mouth and throat problems  RESP:NEGATIVE for significant cough or SOB  CV:  NEGATIVE for chest pains, palpitations  GI: NEGATIVE for nausea, vomiting, diarrhea or abdominal pain.    Physical Exam   BP: 134/80  Pulse: 77  Temp: 97.8  F (36.6  C)  Resp: 16  SpO2: 98 %    Physical Exam   Constitutional: She appears well-developed and well-nourished. No distress.   Cardiovascular: Normal rate, regular rhythm and normal heart sounds.  Exam reveals no gallop and no friction rub.    No murmur heard.  Pulmonary/Chest: Effort normal and breath sounds normal. No respiratory distress. She has no wheezes. She has no rales.   There is a faint shadow and fullness with palpation over the lateral aspect of the left breast.  There is no diffuse erythema consistent with cellulitis.  No palpable focal abscess at this time.    Abdominal: Soft. Bowel sounds are normal. She exhibits no distension.  There is no tenderness. There is no rebound.   Skin: Skin is warm and dry.   There is 4 cm healing surgical incision site in the left axilla without surrounding erythema, warmth, discharge.  There are two additional 1 cm round jeanette one which is violaceous and one pink which patient states are consistent with sites of prior drain placement   Psychiatric: She has a normal mood and affect. Her behavior is normal.     ED Course     ED Course     Procedures          Critical Care time:  none             Labs Ordered and Resulted from Time of ED Arrival Up to the Time of Departure from the ED   CBC WITH PLATELETS DIFFERENTIAL - Abnormal; Notable for the following:        Result Value    Hemoglobin 11.6 (*)     All other components within normal limits   BASIC METABOLIC PANEL     Results for orders placed or performed during the hospital encounter of 12/04/17   US Axillary Left    Narrative    US AXILLARY LEFT 12/4/2017 12:49 PM    HISTORY: recent lymp node biopsy 10/23/17 with compliation of  secondary infection, lymphedema, now with concern over increasing  swelling, rule out abscess;       Impression    IMPRESSION: Hypoechoic area at the surgical site measuring 3.7 x 2.4  cm which could be organizing hematoma. An abscess is difficult to  exclude. Tract from the skin extends to this area.    SHEN LYNNE MD     1:13 PM  I spoke with Dr. Mccracken who was on-call for Dr. Cool regarding patient and did independently review ultrasound report.  She stated that.  Did not appear amenable to drainage at this time and does not sound concerning for infection would recommend deferring antibiotics and following up with Dr. Cool later this week.  Dr. Mccracken put a call into Dr. Cool's nurse and states that they would contact patient with appointment time to be seen later this week.    Assessments & Plan (with Medical Decision Making)     I have reviewed the nursing notes.    I have reviewed the findings, diagnosis, plan and need for  follow up with the patient.       Discharge Medication List as of 12/4/2017  1:39 PM        Final diagnoses:   Visit for wound check     42-year-old female who had a recent lymph node dissection in the left axilla secondary to malignant melanoma which was complicated secondarily by infection present to the emergency department stay requesting wound recheck after she noted increased fullness in the left axilla/left breast in the last 24 hours that she was minimally warm to touch however did not have any increased pain, erythema/skin changes, discharge from her wounds.  She had stable vital signs upon arrival.  Physical exam findings as described above.  As part of evaluation patient did have laboratory testing including CBC, BMP which was significant only for minimally elevated hemoglobin of 11.6.  She did not have any obvious evidence of cellulitis, abscess however given her history did elect to obtain an ultrasound of the tissue which did show a 3.7 x 2.4 cm Hypoechoic area near the surgical site with tract which extends to surface of skin which could be organizing hematoma however could not definitively rule out abscess.  I did discuss case with on-call surgical oncologist Dr. Mccracken who independently did review ultrasound report stated that area did not appear amenable to drainage does not sound concerning for infection and would defer antibiotics at this time.  She did help to facilitate patient's follow up visit to her surgeon for recheck in two days.  Patient was reassured of findings.  She is discharged from stable with instructions to follow up with Dr. Cool as directed.  Signs of infection, worrisome reasons to return to the emergency department sooner discussed.    Disclaimer: This note consists of symbols derived from keyboarding, dictation, and/or voice recognition software. As a result, there may be errors in the script that have gone undetected.  Please consider this when interpreting information found  in the chart.    12/4/2017   [facility] EMERGENCY DEPARTMENT     Lurdes Collazo PA-C  12/07/17 1116

## 2017-12-04 NOTE — ED NOTES
Cellulitis left axilla and breast 2 1/2 weeks ago - swelling worse now and concerned about possible infection

## 2017-12-04 NOTE — TELEPHONE ENCOUNTER
LM to call clinic/RN to clarify situation.  Pt had Surgery on 10/23/17.(Malignant Melanoma of L upper extremity/shoulder)  L breast/chest and under L axilla lymphedema.  Was seen by Lymphedema clinic this AM and currently waiting in the ER to be seen.(Referred there by Lymphedema Clinic?)  Michael

## 2017-12-04 NOTE — ED AVS SNAPSHOT
[facility] Emergency Department    [address] [address]    Phone:  [phone]    Fax:  [phone]                                       Doretha Fernandez   MRN: [id number]    Department:  [facility] Emergency Department   Date of Visit:  12/4/2017           After Visit Summary Signature Page     I have received my discharge instructions, and my questions have been answered. I have discussed any challenges I see with this plan with the nurse or doctor.    ..........................................................................................................................................  Patient/Patient Representative Signature      ..........................................................................................................................................  Patient Representative Print Name and Relationship to Patient    ..................................................               ................................................  Date                                            Time    ..........................................................................................................................................  Reviewed by Signature/Title    ...................................................              ..............................................  Date                                                            Time

## 2017-12-04 NOTE — ED AVS SNAPSHOT
[facility] Emergency Department    [address] [address]    Phone:  [phone]    Fax:  [phone]                                       Doretha Fernandez   MRN: [id number]    Department:  [facility] Emergency Department   Date of Visit:  12/4/2017           Patient Information     Date Of Birth          1975        Your diagnoses for this visit were:     Visit for wound check        You were seen by Lurdes Collazo PA-C.      Follow-up Information     Follow up with Laurent Cool MD.    Specialty:  General Surgery    Why:  as directed by his office for recheck later this week    Contact information:    [address]    [facility] [address] [phone]        Discharge References/Attachments     LYMPHEDEMA (ENGLISH)      Future Appointments        Provider Department Dept Phone Center    12/6/2017 9:00 AM Anastasiya Rodriguez ([place] PTA), PTA [facility] Lymphedema [phone] [facility]    12/7/2017 8:30 AM [facility] [phone] [facility]     12/7/2017 11:00 AM Kathy Richards MD [facility] [phone] [facility]    12/7/2017 11:30 AM [place] chemo therapy [facility] Cancer Infusion [phone] [facility]    12/19/2017 9:00 AM Anna Graham, PT [facility] Lymphedema [phone] [facility]    12/21/2017 9:00 AM Anna Graham, PT [facility] Lymphedema [phone] [facility]    12/27/2017 9:00 AM Anastasiya Rodriguez ([place] PTA), DEVEN [facility] Lymphedema [phone] [facility]    12/29/2017 9:15 AM Anastasiya Rodriguez ([place] PTA), PTA [facility] Lymphedema [phone] [facility]    1/9/2018 10:00 AM Lowell Bullock MD [facility] [phone] [facility]      24 Hour Appointment Hotline       To make an appointment at any [facility] call [phone] ([phone]). If you don't have a family doctor or clinic, we will help you find one.  "[facility] are conveniently located to serve the needs of you and your family.             Review of your medicines      Our records show that you are taking the medicines listed below. If these are incorrect, please call your family doctor or clinic.        Dose / Directions Last dose taken    acetaminophen 325 MG tablet   Commonly known as:  TYLENOL   Dose:  650 mg   Quantity:  100 tablet        Take 2 tablets (650 mg) by mouth every 4 hours as needed for other (mild pain)   Refills:  0        aspirin 81 MG EC tablet   Dose:  81 mg        Take 81 mg by mouth daily   Refills:  0        cephALEXin 500 MG capsule   Commonly known as:  KEFLEX   Dose:  1000 mg   Indication:  Infection of the Skin and/or Related Soft Tissue   Quantity:  24 capsule        Take 2 capsules (1,000 mg) by mouth 2 times daily   Refills:  0        LORazepam 1 MG tablet   Commonly known as:  ATIVAN   Dose:  1 mg   Quantity:  30 tablet        Take 1 tablet (1 mg) by mouth every 8 hours as needed for anxiety   Refills:  0        order for DME   Quantity:  1 Units        Equipment being ordered: 20-30mmHg compression sleeve and 20-30mmHg compression glove\" x2 pair   Refills:  0        oxyCODONE IR 5 MG tablet   Commonly known as:  ROXICODONE   Dose:  5-15 mg   Quantity:  40 tablet        Take 1-3 tablets (5-15 mg) by mouth every 4 hours as needed for pain maximum 12 tablet(s) per day   Refills:  0        rOPINIRole 0.25 MG tablet   Commonly known as:  REQUIP   Dose:  0.25 mg   Quantity:  30 tablet        Take 1 tablet (0.25 mg) by mouth At Bedtime   Refills:  11        senna-docusate 8.6-50 MG per tablet   Commonly known as:  SENOKOT-S;PERICOLACE   Dose:  1 tablet   Quantity:  60 tablet        Take 1 tablet by mouth 2 times daily as needed for constipation   Refills:  1        venlafaxine 150 MG Tb24 24 hr tablet   Commonly known as:  EFFEXOR-ER   Dose:  150 mg   Quantity:  30 each        Take 1 tablet (150 mg) by mouth daily (with " breakfast)   Refills:  0        Vitamin D3 3000 UNITS Tabs   Dose:  3000 Int'l Units   Quantity:  30 tablet        Take 3,000 Int'l Units by mouth daily   Refills:  3        zonisamide 25 MG capsule   Commonly known as:  Zonegran   Quantity:  90 capsule        Take 1 tablet (25 mg) once daily for 1 week, then 2 tablets once daily for 1 week, then 3 tablets once daily for 1 week, then 4 tablets once daily for 1 week.   Refills:  1                Procedures and tests performed during your visit     Basic metabolic panel    CBC with platelets, differential    US Axillary Left      Orders Needing Specimen Collection     None      Pending Results     No orders found from 12/2/2017 to 12/5/2017.            Pending Culture Results     No orders found from 12/2/2017 to 12/5/2017.            Pending Results Instructions     If you had any lab results that were not finalized at the time of your Discharge, you can call the ED Lab Result RN at [phone]. You will be contacted by this team for any positive Lab results or changes in treatment. The nurses are available 7 days a week from 10A to 6:30P.  You can leave a message 24 hours per day and they will return your call.        Test Results From Your Hospital Stay        12/4/2017 12:19 PM      Component Results     Component Value Ref Range & Units Status    WBC 6.1 4.0 - 11.0 10e9/L Final    RBC Count 4.22 3.8 - 5.2 10e12/L Final    Hemoglobin 11.6 (L) 11.7 - 15.7 g/dL Final    Hematocrit 36.3 35.0 - 47.0 % Final    MCV 86 78 - 100 fl Final    MCH 27.5 26.5 - 33.0 pg Final    MCHC 32.0 31.5 - 36.5 g/dL Final    RDW 12.9 10.0 - 15.0 % Final    Platelet Count 331 150 - 450 10e9/L Final    Diff Method Automated Method  Final    % Neutrophils 48.8 % Final    % Lymphocytes 37.4 % Final    % Monocytes 7.8 % Final    % Eosinophils 5.1 % Final    % Basophils 0.7 % Final    % Immature Granulocytes 0.2 % Final    Absolute Neutrophil 3.0 1.6 - 8.3 10e9/L Final    Absolute Lymphocytes  2.3 0.8 - 5.3 10e9/L Final    Absolute Monocytes 0.5 0.0 - 1.3 10e9/L Final    Absolute Eosinophils 0.3 0.0 - 0.7 10e9/L Final    Absolute Basophils 0.0 0.0 - 0.2 10e9/L Final    Abs Immature Granulocytes 0.0 0 - 0.4 10e9/L Final         12/4/2017 12:34 PM      Component Results     Component Value Ref Range & Units Status    Sodium 137 133 - 144 mmol/L Final    Potassium 3.9 3.4 - 5.3 mmol/L Final    Chloride 104 94 - 109 mmol/L Final    Carbon Dioxide 26 20 - 32 mmol/L Final    Anion Gap 7 3 - 14 mmol/L Final    Glucose 82 70 - 99 mg/dL Final    Urea Nitrogen 11 7 - 30 mg/dL Final    Creatinine 0.72 0.52 - 1.04 mg/dL Final    GFR Estimate 89 >60 mL/min/1.7m2 Final    Non  GFR Calc    GFR Estimate If Black >90 >60 mL/min/1.7m2 Final    African American GFR Calc    Calcium 9.0 8.5 - 10.1 mg/dL Final         12/4/2017 12:54 PM      Narrative     US AXILLARY LEFT 12/4/2017 12:49 PM    HISTORY: recent lymp node biopsy 10/23/17 with compliation of  secondary infection, lymphedema, now with concern over increasing  swelling, rule out abscess;         Impression     IMPRESSION: Hypoechoic area at the surgical site measuring 3.7 x 2.4  cm which could be organizing hematoma. An abscess is difficult to  exclude. Tract from the skin extends to this area.    SHEN LYNNE MD                Thank you for choosing [facility]       Thank you for choosing [facility] for your care. Our goal is always to provide you with excellent care. Hearing back from our patients is one way we can continue to improve our services. Please take a few minutes to complete the written survey that you may receive in the mail after you visit with us. Thank you!        [facility] Information     [facility] gives you secure access to your electronic health record. If you see a primary care provider, you can also send messages to your care team and make appointments. If you have questions, please call your primary care clinic.  If you do not have a  primary care provider, please call [phone] and they will assist you.        Care EveryWhere ID     This is your Care EveryWhere ID. This could be used by other organizations to access your [facility] medical records  [id number]        Equal Access to Services     BRIGETTE QUARLES : Nicol Gustafson, melba valencia, pancho tate. So [facility] [phone].    ATENCIÓN: Si habla español, tiene a mcdonnell disposición servicios gratuitos de asistencia lingüística. Llame al [phone].    We comply with applicable federal civil rights laws and [place] laws. We do not discriminate on the basis of race, color, national origin, age, disability, sex, sexual orientation, or gender identity.            After Visit Summary       This is your record. Keep this with you and show to your community pharmacist(s) and doctor(s) at your next visit.

## 2017-12-05 ENCOUNTER — CARE COORDINATION (OUTPATIENT)
Dept: CARE COORDINATION | Facility: CLINIC | Age: 42
End: 2017-12-05

## 2017-12-05 NOTE — PROGRESS NOTES
Patient in for wound check. CC will follow up later this week to check in.    Сергей Mott RN  Clinic Care Coordinator  [phone] or [phone]

## 2017-12-06 NOTE — TELEPHONE ENCOUNTER
Sorry, I didn't get this message until now, didn't see that she wanted to be fit in two days ago.  I would recommend she make a visit to follow up with me at some point so that I can help coordinate care for her.  It's unfortunately not always possible to be seen the same day for things, but I apologize if she feels we're letting her down.    Anna Naidu M.D.

## 2017-12-07 ENCOUNTER — OFFICE VISIT (OUTPATIENT)
Dept: ONCOLOGY | Facility: CLINIC | Age: 42
End: 2017-12-07
Attending: SURGERY
Payer: COMMERCIAL

## 2017-12-07 ENCOUNTER — ONCOLOGY VISIT (OUTPATIENT)
Dept: ONCOLOGY | Facility: CLINIC | Age: 42
End: 2017-12-07
Attending: INTERNAL MEDICINE
Payer: COMMERCIAL

## 2017-12-07 ENCOUNTER — INFUSION THERAPY VISIT (OUTPATIENT)
Dept: INFUSION THERAPY | Facility: CLINIC | Age: 42
End: 2017-12-07
Attending: INTERNAL MEDICINE
Payer: COMMERCIAL

## 2017-12-07 VITALS
RESPIRATION RATE: 16 BRPM | TEMPERATURE: 96.8 F | HEIGHT: 63 IN | WEIGHT: 210.6 LBS | DIASTOLIC BLOOD PRESSURE: 77 MMHG | SYSTOLIC BLOOD PRESSURE: 128 MMHG | HEART RATE: 86 BPM | OXYGEN SATURATION: 97 % | BODY MASS INDEX: 37.32 KG/M2

## 2017-12-07 VITALS — SYSTOLIC BLOOD PRESSURE: 125 MMHG | DIASTOLIC BLOOD PRESSURE: 71 MMHG | HEART RATE: 71 BPM

## 2017-12-07 DIAGNOSIS — J45.20 MILD INTERMITTENT ASTHMA WITHOUT COMPLICATION: ICD-10-CM

## 2017-12-07 DIAGNOSIS — C43.9 METASTATIC MALIGNANT MELANOMA (H): Primary | ICD-10-CM

## 2017-12-07 DIAGNOSIS — F32.0 MILD MAJOR DEPRESSION (H): ICD-10-CM

## 2017-12-07 DIAGNOSIS — C43.62 MALIGNANT MELANOMA OF LEFT UPPER EXTREMITY INCLUDING SHOULDER (H): Primary | ICD-10-CM

## 2017-12-07 PROCEDURE — 99211 OFF/OP EST MAY X REQ PHY/QHP: CPT | Mod: 27

## 2017-12-07 PROCEDURE — 99213 OFFICE O/P EST LOW 20 MIN: CPT | Mod: ZF

## 2017-12-07 PROCEDURE — 99214 OFFICE O/P EST MOD 30 MIN: CPT | Performed by: INTERNAL MEDICINE

## 2017-12-07 PROCEDURE — 25000128 H RX IP 250 OP 636: Performed by: INTERNAL MEDICINE

## 2017-12-07 PROCEDURE — 96413 CHEMO IV INFUSION 1 HR: CPT

## 2017-12-07 RX ORDER — VENLAFAXINE HYDROCHLORIDE 150 MG/1
150 TABLET, EXTENDED RELEASE ORAL
Qty: 30 EACH | Refills: 0 | Status: SHIPPED | OUTPATIENT
Start: 2017-12-07 | End: 2018-01-25

## 2017-12-07 RX ADMIN — SODIUM CHLORIDE 280 MG: 9 INJECTION, SOLUTION INTRAVENOUS at 12:15

## 2017-12-07 RX ADMIN — SODIUM CHLORIDE 250 ML: 9 INJECTION, SOLUTION INTRAVENOUS at 12:15

## 2017-12-07 ASSESSMENT — PAIN SCALES - GENERAL: PAINLEVEL: NO PAIN (0)

## 2017-12-07 NOTE — PROGRESS NOTES
Doretha Fernandez is here for a postoperative visit.  Since I saw her last and removed her drain, she developed a cellulitis of her left axilla and breast.  She was treated with IV antibiotics and is doing much better now.  She really denies any pain today or fever.      PHYSICAL EXAMINATION:  Her incision is well-healed.  There is no erythema.  There is no tenderness.  Her range of motion is still not 100% yet.  She has been working with lymphedema experts twice a week.      PLAN:  She is going to continue with her immune therapy tomorrow.  She is going to go on a cruise, and I think that is appropriate.  I will see her back if any problems arise.      cc:   Kathy Richards MD   [facility]   [address] [place] [address]

## 2017-12-07 NOTE — PATIENT INSTRUCTIONS
We would like to see you back in clinic with Dr. Richards in 4 weeks with infusion to be scheduled per treatment plan.      Your prescription (effexor) has been sent to:   [facility] [place], [place] - [address] [facility] [address]  [address]  [facility] [address]  Phone: [phone] Fax: [phone] Alternate Fax: [phone]  When you are in need of a refill, please call your pharmacy and they will send us a request.      Copy of appointments, and after visit summary (AVS) given to patient.  If you have any questions during business hours (M-F 8 AM- 4PM), please call Jeanie Keenan RN, BSN, OCN Oncology Hematology /Breast Cancer Navigator at [facility] [phone].   For questions after business hours, or on holidays/weekends, please call our after hours Nurse Triage line [phone]. Thank you.

## 2017-12-07 NOTE — NURSING NOTE
"Oncology Rooming Note    December 7, 2017 10:58 AM   Doretha Fernandez is a 42 year old female who presents for:    Chief Complaint   Patient presents with     Oncology Clinic Visit     2 week recheck Metastatic malignant melanoma      Initial Vitals: /77 (BP Location: Right arm, Patient Position: Sitting, Cuff Size: Adult Large)  Pulse 86  Temp 96.8  F (36  C) (Tympanic)  Resp 16  Ht 1.6 m (5' 2.99\")  Wt 95.5 kg (210 lb 9.6 oz)  SpO2 97%  Breastfeeding? No  BMI 37.32 kg/m2 Estimated body mass index is 37.32 kg/(m^2) as calculated from the following:    Height as of this encounter: 1.6 m (5' 2.99\").    Weight as of this encounter: 95.5 kg (210 lb 9.6 oz). Body surface area is 2.06 meters squared.  No Pain (0) Comment: Data Unavailable   No LMP recorded.  Allergies reviewed: Yes  Medications reviewed: Yes    Medications: MEDICATION REFILLS NEEDED TODAY. Provider was notified.  Pharmacy name entered into [facility]: [facility] [place], [place] - [address]    Clinical concerns:  2 week recheck Metastatic malignant melanoma.       Patient requesting a refill of Effexor leaving on vacation tomorrow.       7  minutes for nursing intake (face to face time)     Lesa Patel CMA              "

## 2017-12-07 NOTE — MR AVS SNAPSHOT
After Visit Summary   12/7/2017    Doretha Fernandez    MRN: [id number]           Patient Information     Date Of Birth          1975        Visit Information        Provider Department      12/7/2017 11:00 AM Kathy Richards MD [facility] ONCOLOGY      Today's Diagnoses     Mild major depression (H)          Care Instructions    We would like to see you back in clinic with Dr. Richards in 4 weeks with infusion to be scheduled per treatment plan.      Your prescription (effexor) has been sent to:   [facility] [place] [address]  [address]  [facility] [address]  Phone: [phone] Fax: [phone] Alternate Fax: [phone]  When you are in need of a refill, please call your pharmacy and they will send us a request.      Copy of appointments, and after visit summary (AVS) given to patient.  If you have any questions during business hours (M-F 8 AM- 4PM), please call Jeanie Keenan RN, BSN, OCN Oncology Hematology /Breast Cancer Navigator at [facility] [phone].   For questions after business hours, or on holidays/weekends, please call our after hours Nurse Triage line [phone]. Thank you.            Follow-ups after your visit        Follow-up notes from your care team     Return in about 4 weeks (around 1/4/2018) for Schedule for chemotherapy as per treatment plan.      Your next 10 appointments already scheduled     Dec 19, 2017  9:00 AM CST   Lymphedema Treatment with Anna Graham PT   [facility] Lymphedema ([facility])    [address] [facility] [address]   [phone]            Dec 21, 2017  9:00 AM CST   Lymphedema Treatment with Anna Graham PT   [facility]ema ([facility])    [address] [facility] [address]   [phone]            Dec 21, 2017 10:00 AM CST   LAB with [place] [facility]  [facility] ([facility])    [address] [facility] [address]   [phone]           Please do not eat 10-12 hours before your appointment if you are coming in fasting for labs on lipids, cholesterol, or glucose (sugar). This does not apply to pregnant women. Water, hot tea and black coffee (with nothing added) are okay. Do not drink other fluids, diet soda or chew gum.            Dec 21, 2017 11:00 AM CST   Level 2 with ROOM 10 [facility] ([facility])    [facility]  [address]  [facility] [address]   [phone]            Dec 27, 2017  9:00 AM CST   Lymphedema Treatment with Anastasiya Rodriguez PTA   [facility] Lymphedema ([facility])    [address] [facility] [address]   [phone]            Dec 29, 2017  9:15 AM CST   Lymphedema Treatment with Anastasiya Rodriguez PTA   [facility] Lymphedema ([facility])    [address] [facility] [address]   [phone]            Jan 04, 2018  1:30 PM CST   Level 2 with ROOM 9 [facility] ([facility])    [facility]  [address]  [facility] [address]   [phone]            Jan 09, 2018 10:00 AM CST   Return Visit with Lowell Bullock MD   [facility] ([facility])    [address] [facility] [address]   [phone]            Jan 18, 2018 10:30 AM CST   Return Visit with Kathy Richards MD   [facility] ([facility])    [facility]  [address]  [facility] [address]   [phone]            Jan 18, 2018 11:00 AM CST   Level 2 with ROOM 10 [facility] ([facility])    [facility]  [address]  [facility] [address]   [phone]              Who to contact   "   If you have questions or need follow up information about today's clinic visit or your schedule please contact [facility] directly at [phone].  Normal or non-critical lab and imaging results will be communicated to you by MyChart, letter or phone within 4 business days after the clinic has received the results. If you do not hear from us within 7 days, please contact the clinic through [employer] or phone. If you have a critical or abnormal lab result, we will notify you by phone as soon as possible.  Submit refill requests through [employer] or call your pharmacy and they will forward the refill request to us. Please allow 3 business days for your refill to be completed.          Additional Information About Your Visit        SlickLoginharField Agent Information     [employer] gives you secure access to your electronic health record. If you see a primary care provider, you can also send messages to your care team and make appointments. If you have questions, please call your primary care clinic.  If you do not have a primary care provider, please call [phone] and they will assist you.        Care EveryWhere ID     This is your Care EveryWhere ID. This could be used by other organizations to access your [facility] medical records  [id number]        Your Vitals Were     Pulse Temperature Respirations Height Pulse Oximetry Breastfeeding?    86 96.8  F (36  C) (Tympanic) 16 1.6 m (5' 2.99\") 97% No    BMI (Body Mass Index)                   37.32 kg/m2            Blood Pressure from Last 3 Encounters:   12/07/17 128/77   12/04/17 (!) 130/96   11/24/17 110/70    Weight from Last 3 Encounters:   12/07/17 95.5 kg (210 lb 9.6 oz)   11/24/17 94.3 kg (207 lb 12.8 oz)   11/22/17 93.9 kg (207 lb)              Today, you had the following     No orders found for display         Where to get your medicines      These medications were sent to [facility] [place], [place] - [address]  [address] [facility] [address]     Phone:  [phone]     venlafaxine 150 MG Tb24 24 hr tablet          Primary Care Provider Office Phone # Fax #    Anna Naidu [phone] [address]  [facility] [address]        Equal Access to Services     [facility] : Hadii aad ku hadasho Soomaali, waaxda luqadaha, qaybta kaalmada adeegyada, waxay idiin hayaan adeeg khlula ladkn . So [facility] [phone].    ATENCIÓN: Si habla español, tiene a mcdonnell disposición servicios gratuitos de asistencia lingüística. [facility] [phone].    We comply with applicable federal civil rights laws and [place] laws. We do not discriminate on the basis of race, color, national origin, age, disability, sex, sexual orientation, or gender identity.            Thank you!     Thank you for choosing [facility]  for your care. Our goal is always to provide you with excellent care. Hearing back from our patients is one way we can continue to improve our services. Please take a few minutes to complete the written survey that you may receive in the mail after your visit with us. Thank you!             Your Updated Medication List - Protect others around you: Learn how to safely use, store and throw away your medicines at www.disposemymeds.org.          This list is accurate as of: 12/7/17 12:16 PM.  Always use your most recent med list.                   Brand Name Dispense Instructions for use Diagnosis    acetaminophen 325 MG tablet    TYLENOL    100 tablet    Take 2 tablets (650 mg) by mouth every 4 hours as needed for other (mild pain)    Malignant melanoma of left upper extremity including shoulder (H)       aspirin 81 MG EC tablet      Take 81 mg by mouth daily        cephALEXin 500 MG capsule    KEFLEX    24 capsule    Take 2 capsules (1,000 mg) by mouth 2 times daily    Cellulitis of chest wall       LORazepam 1 MG tablet    ATIVAN    30 tablet    Take 1 tablet (1 mg) by mouth every 8 hours as  "needed for anxiety    Anxiety, Acute intractable tension-type headache       order for DME     1 Units    Equipment being ordered: 20-30mmHg compression sleeve and 20-30mmHg compression glove\" x2 pair    Lymphedema of left arm       oxyCODONE IR 5 MG tablet    ROXICODONE    40 tablet    Take 1-3 tablets (5-15 mg) by mouth every 4 hours as needed for pain maximum 12 tablet(s) per day    Cellulitis of chest wall       rOPINIRole 0.25 MG tablet    REQUIP    30 tablet    Take 1 tablet (0.25 mg) by mouth At Bedtime    Restless leg syndrome       senna-docusate 8.6-50 MG per tablet    SENOKOT-S;PERICOLACE    60 tablet    Take 1 tablet by mouth 2 times daily as needed for constipation    Malignant melanoma of left upper extremity including shoulder (H)       venlafaxine 150 MG Tb24 24 hr tablet    EFFEXOR-ER    30 each    Take 1 tablet (150 mg) by mouth daily (with breakfast)    Mild major depression (H)       Vitamin D3 3000 UNITS Tabs     30 tablet    Take 3,000 Int'l Units by mouth daily    Vitamin D deficiency       zonisamide 25 MG capsule    Zonegran    90 capsule    Take 1 tablet (25 mg) once daily for 1 week, then 2 tablets once daily for 1 week, then 3 tablets once daily for 1 week, then 4 tablets once daily for 1 week.    Headache disorder         "

## 2017-12-07 NOTE — LETTER
12/7/2017       RE: Doretha Fernandez  [address] [address]     Dear Colleague,    Thank you for referring your patient, Doretha Fernandez, to the [facility] BREAST [facility]ENTER at [facility]. Please see a copy of my visit note below.    Doretha Fernandez is here for a postoperative visit.  Since I saw her last and removed her drain, she developed a cellulitis of her left axilla and breast.  She was treated with IV antibiotics and is doing much better now.  She really denies any pain today or fever.      PHYSICAL EXAMINATION:  Her incision is well-healed.  There is no erythema.  There is no tenderness.  Her range of motion is still not 100% yet.  She has been working with lymphedema experts twice a week.      PLAN:  She is going to continue with her immune therapy tomorrow.  She is going to go on a cruise, and I think that is appropriate.  I will see her back if any problems arise.      cc:   Kathy Richards MD   [facility]   [address] [place] [address]     Again, thank you for allowing me to participate in the care of your patient.      Sincerely,    Laurent Cool MD

## 2017-12-07 NOTE — MR AVS SNAPSHOT
After Visit Summary   12/7/2017    Doretha Fernandez    MRN: [id number]           Patient Information     Date Of Birth          1975        Visit Information        Provider Department      12/7/2017 8:00 AM Laurent Cool MD [facility]        Today's Diagnoses     Metastatic malignant melanoma (H)    -  1       Follow-ups after your visit        Your next 10 appointments already scheduled     Dec 19, 2017  9:00 AM CST   Lymphedema Treatment with Anna Graham PT   [facility] L[facility]hedema ([facility])    [address] [facility] [address]   [phone]            Dec 21, 2017  9:00 AM CST   Lymphedema Treatment with Anna Graham PT   [facility] Lymp[facility]ema ([facility])    [address] [facility] [address]   [phone]            Dec 21, 2017 10:00 AM CST   LAB with [facility] ([facility])    [address] [facility] [address]   [phone]           Please do not eat 10-12 hours before your appointment if you are coming in fasting for labs on lipids, cholesterol, or glucose (sugar). This does not apply to pregnant women. Water, hot tea and black coffee (with nothing added) are okay. Do not drink other fluids, diet soda or chew gum.            Dec 21, 2017 11:00 AM CST   Level 2 with ROOM 10 [facility] ([facility])    [facility]  [address]  [facility] [address]   [phone]            Dec 27, 2017  9:00 AM CST   Lymphedema Treatment with Anastasiya Rodriguez PTA   [facility]ema ([facility])    [address] [facility] [address]   [phone]            Dec 29, 2017  9:15 AM CST   Lymphedema Treatment with Anastasiya Rodriguez PTA   [facility] ([facility])    [address] [facility] [address]   [phone]             Jan 04, 2018  1:30 PM CST   Level 2 with ROOM 9 [facility] ([facility])    [facility]  [address]  [facility] [address]   [phone]            Jan 09, 2018 10:00 AM CST   Return Visit with Lowell Bullock MD   [facility] ([facility])    [address] [facility] [address]   [phone]            Jan 18, 2018 10:30 AM CST   Return Visit with Kathy Richards MD   [facility] ([facility])    [facility]  [address]  [facility] [address]   [phone]            Jan 18, 2018 11:00 AM CST   Level 2 with ROOM 10 [facility] ([facility])    [facility]  [address]  [facility] [address]   [phone]              Who to contact     If you have questions or need follow up information about today's clinic visit or your schedule please contact [facility] directly at [phone].  Normal or non-critical lab and imaging results will be communicated to you by MyChart, letter or phone within 4 business days after the clinic has received the results. If you do not hear from us within 7 days, please contact the clinic through [facility] or phone. If you have a critical or abnormal lab result, we will notify you by phone as soon as possible.  Submit refill requests through [employer] or call your pharmacy and they will forward the refill request to us. Please allow 3 business days for your refill to be completed.          Additional Information About Your Visit        [facility] SystemsharAnafocus Information     [employer] gives you secure access to your electronic health record. If you see a primary care provider, you can also send messages to your care team and make appointments. If you have questions, please call your primary care clinic.  If you do not have a primary care provider, please call  [phone] and they will assist you.        Care EveryWhere ID     This is your Care EveryWhere ID. This could be used by other organizations to access your [place] medical records  [id number]         Blood Pressure from Last 3 Encounters:   12/07/17 125/71   12/07/17 128/77   12/04/17 (!) 130/96    Weight from Last 3 Encounters:   12/07/17 95.5 kg (210 lb 9.6 oz)   11/24/17 94.3 kg (207 lb 12.8 oz)   11/22/17 93.9 kg (207 lb)              Today, you had the following     No orders found for display         Where to get your medicines      These medications were sent to [facility] [address]  [address] [facility] [address]     Phone:  [phone]     venlafaxine 150 MG Tb24 24 hr tablet          Primary Care Provider Office Phone # Fax #    Anna Naidu [phone] [address] [facility] [address]        Equal Access to Services     [facility]: Hadii aad ku hadasho Soomaali, waaxda luqadaha, qaybta kaalmada adeegyada, pancho beunon liliam moore . So [facility] [phone].    ATENCIÓN: Si habla español, tiene a mcdonnell disposición servicios gratuitos de asistencia lingüística. Llame al [phone].    We comply with applicable federal civil rights laws and [place] laws. We do not discriminate on the basis of race, color, national origin, age, disability, sex, sexual orientation, or gender identity.            Thank you!     Thank you for choosing [facility]  for your care. Our goal is always to provide you with excellent care. Hearing back from our patients is one way we can continue to improve our services. Please take a few minutes to complete the written survey that you may receive in the mail after your visit with us. Thank you!             Your Updated Medication List - Protect others around you: Learn how to safely use, store and throw away your medicines at  "www.disposemymeds.org.          This list is accurate as of: 12/7/17 11:59 PM.  Always use your most recent med list.                   Brand Name Dispense Instructions for use Diagnosis    acetaminophen 325 MG tablet    TYLENOL    100 tablet    Take 2 tablets (650 mg) by mouth every 4 hours as needed for other (mild pain)    Malignant melanoma of left upper extremity including shoulder (H)       aspirin 81 MG EC tablet      Take 81 mg by mouth daily        cephALEXin 500 MG capsule    KEFLEX    24 capsule    Take 2 capsules (1,000 mg) by mouth 2 times daily    Cellulitis of chest wall       LORazepam 1 MG tablet    ATIVAN    30 tablet    Take 1 tablet (1 mg) by mouth every 8 hours as needed for anxiety    Anxiety, Acute intractable tension-type headache       order for DME     1 Units    Equipment being ordered: 20-30mmHg compression sleeve and 20-30mmHg compression glove\" x2 pair    Lymphedema of left arm       oxyCODONE IR 5 MG tablet    ROXICODONE    40 tablet    Take 1-3 tablets (5-15 mg) by mouth every 4 hours as needed for pain maximum 12 tablet(s) per day    Cellulitis of chest wall       rOPINIRole 0.25 MG tablet    REQUIP    30 tablet    Take 1 tablet (0.25 mg) by mouth At Bedtime    Restless leg syndrome       senna-docusate 8.6-50 MG per tablet    SENOKOT-S;PERICOLACE    60 tablet    Take 1 tablet by mouth 2 times daily as needed for constipation    Malignant melanoma of left upper extremity including shoulder (H)       venlafaxine 150 MG Tb24 24 hr tablet    EFFEXOR-ER    30 each    Take 1 tablet (150 mg) by mouth daily (with breakfast)    Mild major depression (H)       Vitamin D3 3000 UNITS Tabs     30 tablet    Take 3,000 Int'l Units by mouth daily    Vitamin D deficiency       zonisamide 25 MG capsule    Zonegran    90 capsule    Take 1 tablet (25 mg) once daily for 1 week, then 2 tablets once daily for 1 week, then 3 tablets once daily for 1 week, then 4 tablets once daily for 1 week.    Headache " disorder

## 2017-12-07 NOTE — LETTER
12/7/2017         RE: Doretha Fernandez  [address] [facility] [address]  [place] [address]        Dear Colleague,    Thank you for referring your patient, Doretha Fernandez, to the [facility]. Please see a copy of my visit note below.    Hematology/ Oncology Follow-up Visit:  Dec 7, 2017    Reason for Visit:   Chief Complaint   Patient presents with     Oncology Clinic Visit     2 week recheck Metastatic malignant melanoma        Oncologic History:  Metastatic melanoma.    Interval History:  Patient had her first cycle of immunotherapy with opdivo 2 weeks ago. She tolerated treatment very well without significant side effects. She denies any nausea or vomiting or diarrhea. She denies any fever or chills. She denies any skin rash or joint aches or pains.    Review Of Systems:  Constitutional: Negative for fever, chills, and night sweats.  Skin: negative.  Eyes: negative.  Ears/Nose/Throat: negative.  Respiratory: No shortness of breath, dyspnea on exertion, cough, or hemoptysis.  Cardiovascular: negative.  Gastrointestinal: negative.  Genitourinary: negative.  Musculoskeletal: negative.  Neurologic: negative.  Psychiatric: negative.  Hematologic/Lymphatic/Immunologic: negative.  Endocrine: negative.    All other ROS negative unless mentioned in interval history.    Past medical, social, surgical, and family histories reviewed.    Allergies:  Allergies as of 12/07/2017 - Nicola as Reviewed 12/07/2017   Allergen Reaction Noted     Compazine [prochlorperazine] Fatigue 10/12/2017     Fentanyl Other (See Comments) 10/12/2017     Erythrocin Nausea and Vomiting 03/20/2008     Zithromax [azithromycin dihydrate] Diarrhea 02/17/2012       Current Medications:  Current Outpatient Prescriptions   Medication Sig Dispense Refill     venlafaxine (EFFEXOR-ER) 150 MG TB24 24 hr tablet Take 1 tablet (150 mg) by mouth daily (with breakfast) 30 each 0     order for DME Equipment being ordered: 20-30mmHg compression sleeve and 20-30mmHg  "compression glove\" x2 pair 1 Units 0     rOPINIRole (REQUIP) 0.25 MG tablet Take 1 tablet (0.25 mg) by mouth At Bedtime 30 tablet 11     cephALEXin (KEFLEX) 500 MG capsule Take 2 capsules (1,000 mg) by mouth 2 times daily 24 capsule 0     oxyCODONE IR (ROXICODONE) 5 MG tablet Take 1-3 tablets (5-15 mg) by mouth every 4 hours as needed for pain maximum 12 tablet(s) per day 40 tablet 0     aspirin 81 MG EC tablet Take 81 mg by mouth daily       acetaminophen (TYLENOL) 325 MG tablet Take 2 tablets (650 mg) by mouth every 4 hours as needed for other (mild pain) 100 tablet 0     senna-docusate (SENOKOT-S;PERICOLACE) 8.6-50 MG per tablet Take 1 tablet by mouth 2 times daily as needed for constipation 60 tablet 1     LORazepam (ATIVAN) 1 MG tablet Take 1 tablet (1 mg) by mouth every 8 hours as needed for anxiety 30 tablet 0     Cholecalciferol (VITAMIN D3) 3000 UNITS TABS Take 3,000 Int'l Units by mouth daily 30 tablet 3     zonisamide (ZONEGRAN) 25 MG capsule Take 1 tablet (25 mg) once daily for 1 week, then 2 tablets once daily for 1 week, then 3 tablets once daily for 1 week, then 4 tablets once daily for 1 week. 90 capsule 1        Physical Exam:  /77 (BP Location: Right arm, Patient Position: Sitting, Cuff Size: Adult Large)  Pulse 86  Temp 96.8  F (36  C) (Tympanic)  Resp 16  Ht 1.6 m (5' 2.99\")  Wt 95.5 kg (210 lb 9.6 oz)  SpO2 97%  Breastfeeding? No  BMI 37.32 kg/m2  Wt Readings from Last 12 Encounters:   12/07/17 95.5 kg (210 lb 9.6 oz)   11/24/17 94.3 kg (207 lb 12.8 oz)   11/22/17 93.9 kg (207 lb)   11/14/17 95.9 kg (211 lb 6.7 oz)   11/09/17 94.8 kg (208 lb 14.4 oz)   11/02/17 96.1 kg (211 lb 12.8 oz)   11/02/17 95.8 kg (211 lb 4.8 oz)   10/27/17 95.8 kg (211 lb 1.6 oz)   10/23/17 94.5 kg (208 lb 5.4 oz)   10/18/17 94.7 kg (208 lb 12.8 oz)   10/13/17 94.6 kg (208 lb 8 oz)   10/12/17 93.9 kg (207 lb)     ECOG performance status: 0  GENERAL APPEARANCE: Healthy, alert and in no acute " distress.  HEENT: Sclerae anicteric. PERRLA. Oropharynx without ulcers, lesions, or thrush.  NECK: Supple. No asymmetry or masses.  LYMPHATICS: No palpable cervical, supraclavicular, axillary, or inguinal lymphadenopathy.  RESP: Lungs clear to auscultation bilaterally without rales, rhonchi or wheezes.  CARDIOVASCULAR: Regular rate and rhythm. Normal S1, S2; no S3 or S4. No murmur, gallop, or rub.  ABDOMEN: Soft, nontender. Bowel sounds normal. No palpable organomegaly or masses.  MUSCULOSKELETAL: Extremities without gross deformities noted. No edema of bilateral lower extremities.  SKIN: No suspicious lesions or rashes.  NEURO: Alert and oriented x 3. Cranial nerves II-XII grossly intact.  PSYCHIATRIC: Mentation and affect appear normal.    Laboratory/Imaging Studies:  Infusion Therapy Visit on 11/24/2017   Component Date Value Ref Range Status     Sodium 11/24/2017 137  133 - 144 mmol/L Final     Potassium 11/24/2017 4.5  3.4 - 5.3 mmol/L Final     Chloride 11/24/2017 107  94 - 109 mmol/L Final     Carbon Dioxide 11/24/2017 22  20 - 32 mmol/L Final     Anion Gap 11/24/2017 8  3 - 14 mmol/L Final     Glucose 11/24/2017 98  70 - 99 mg/dL Final     Urea Nitrogen 11/24/2017 13  7 - 30 mg/dL Final     Creatinine 11/24/2017 0.77  0.52 - 1.04 mg/dL Final     GFR Estimate 11/24/2017 83  >60 mL/min/1.7m2 Final    Non  GFR Calc     GFR Estimate If Black 11/24/2017 >90  >60 mL/min/1.7m2 Final    African American GFR Calc     Calcium 11/24/2017 9.0  8.5 - 10.1 mg/dL Final     Bilirubin Total 11/24/2017 0.2  0.2 - 1.3 mg/dL Final     Albumin 11/24/2017 3.8  3.4 - 5.0 g/dL Final     Protein Total 11/24/2017 7.9  6.8 - 8.8 g/dL Final     Alkaline Phosphatase 11/24/2017 68  40 - 150 U/L Final     ALT 11/24/2017 42  0 - 50 U/L Final     AST 11/24/2017 25  0 - 45 U/L Final     TSH 11/24/2017 2.41  0.40 - 4.00 mU/L Final        Recent Results (from the past 744 hour(s))   US Extremity Non Vascular Left    Narrative     US EXTREMITY NON VASCULAR LEFT 11/14/2017 8:55 PM     HISTORY: Left axillary cellulitis with concerns for abscess.      FINDINGS: Ultrasound in the area of patient's cellulitis left axillary  region is performed. A small complex appearing fluid collection is  noted in the area of patient's cellulitis concerning for abscess  measuring 2.0 x 0.8 x 1.0 cm.      Impression    IMPRESSION: Small abscess left axilla in area of patient's cellulitis.    ALEX RIVERA MD   US Axillary Left    Narrative    US AXILLARY LEFT 12/4/2017 12:49 PM    HISTORY: recent lymp node biopsy 10/23/17 with compliation of  secondary infection, lymphedema, now with concern over increasing  swelling, rule out abscess;       Impression    IMPRESSION: Hypoechoic area at the surgical site measuring 3.7 x 2.4  cm which could be organizing hematoma. An abscess is difficult to  exclude. Tract from the skin extends to this area.    SHEN LYNNE MD       Assessment and plan:  (C79.9) Metastatic malignant melanoma (H)  (primary encounter diagnosis)  Patient tolerated adjuvant immunotherapy was opdivo well. We will proceed with second cycle today. I will see the patient again in one month or sooner if there are new developments or concerns.    (F32.0) Mild major depression (H)  Plan: venlafaxine (EFFEXOR-ER) 150 MG TB24 24 hr  tablet    (J45.20) Mild intermittent asthma without complication  Patient has met is currently stable.      The patient is ready to learn, no apparent learning barriers were identified.  Diagnosis and treatment plans were explained to the patient. The patient expressed understanding of the content. The patient asked appropriate questions. The patient questions were answered to her satisfaction.    Chart documentation with Dragon Voice recognition Software. Although reviewed after completion, some words and grammatical errors may remain.    Again, thank you for allowing me to participate in the care of your patient.         Sincerely,        Kathy Richards MD

## 2017-12-07 NOTE — NURSING NOTE
"Oncology Rooming Note    December 7, 2017 8:35 AM   Doretha Fernandez is a 42 year old female who presents for:    Chief Complaint   Patient presents with     Oncology Clinic Visit     Return:  Post op 10/23     Initial Vitals: There were no vitals taken for this visit. Estimated body mass index is 36.81 kg/(m^2) as calculated from the following:    Height as of 11/24/17: 1.6 m (5' 3\").    Weight as of 11/24/17: 94.3 kg (207 lb 12.8 oz). There is no height or weight on file to calculate BSA.  Data Unavailable Comment: Data Unavailable   No LMP recorded.  Allergies reviewed: Yes  Medications reviewed: Yes    Medications: Medication refills not needed today.  Pharmacy name entered into [employer]: [facility] [place], [place] - [address] [address]    Clinical concerns: No new concerns at this time.   Dr. Cool was NOT notified.     Patient already received flu injection 11/02/2017 at Primary care clinic      10 minutes for nursing intake (face to face time)     Hoang Hoang CMA              "

## 2017-12-07 NOTE — PROGRESS NOTES
Infusion Nursing Note:  Doretha Fernandez presents today for C1D15 Opdivo.    Patient seen by provider today: Yes: Dr. Richards   present during visit today: Not Applicable.    Note: N/A.    Intravenous Access:  Peripheral IV placed. Pt does not have very good veins. Discussed port placement. She will think about it.     Treatment Conditions:  No labs today.      Post Infusion Assessment:  Patient tolerated infusion without incident.  Blood return noted pre and post infusion.  Site patent and intact, free from redness, edema or discomfort.  No evidence of extravasations.  Access discontinued per protocol.    Discharge Plan:   Patient discharged in stable condition accompanied by: self.  Departure Mode: Ambulatory.    Josee Berumen RN

## 2017-12-07 NOTE — MR AVS SNAPSHOT
After Visit Summary   12/7/2017    Doretha Fernandez    MRN: [id number]           Patient Information     Date Of Birth          1975        Visit Information        Provider Department      12/7/2017 11:30 AM ROOM 3 [facility]        Today's Diagnoses     Malignant melanoma of left upper extremity including shoulder (H)    -  1       Follow-ups after your visit        Your next 10 appointments already scheduled     Dec 19, 2017  9:00 AM CST   Lymphedema Treatment with Anna Graham PT   [facility] ([facility])    [address] [address] [facility] [address]   [phone]            Dec 21, 2017  9:00 AM CST   Lymphedema Treatment with Anna Graham PT   [facility] ([facility])    [address] [address]   [phone]            Dec 21, 2017 10:00 AM CST   LAB with [facility] ([facility])    [address] [address]   [phone]           Please do not eat 10-12 hours before your appointment if you are coming in fasting for labs on lipids, cholesterol, or glucose (sugar). This does not apply to pregnant women. Water, hot tea and black coffee (with nothing added) are okay. Do not drink other fluids, diet soda or chew gum.            Dec 21, 2017 11:00 AM CST   Level 2 with ROOM 10 [facility] ([facility])    [facility]  [address]  [facility] [address]   [phone]            Dec 27, 2017  9:00 AM CST   Lymphedema Treatment with Anastasiya Rodriguez PTA   [facility] ([facility])    [address] [facility] [address]   [phone]            Dec 29, 2017  9:15 AM CST   Lymphedema Treatment with Anastasiya Rodriguez PTA   [facility] ([facility])    [address] [facility] [address]    [phone]            Jan 04, 2018  1:30 PM CST   Level 2 with ROOM 9 [facility] ([facility])    [facility]  [address]  [facility] [address]   [phone]            Jan 09, 2018 10:00 AM CST   Return Visit with Lowell Bullock MD   [facility] ([facility]    [address] [facility] [address]   [phone]            Jan 18, 2018 10:30 AM CST   Return Visit with Kathy Richards MD   [facility] ([facility])    [facility]  [address]  [facility] [address]   [phone]            Jan 18, 2018 11:00 AM CST   Level 2 with ROOM 10 [facility] ([facility])    [facility]  [address]  [facility] [address]   [phone]              Who to contact     If you have questions or need follow up information about today's clinic visit or your schedule please contact [facility] directly at [phone].  Normal or non-critical lab and imaging results will be communicated to you by [employer], letter or phone within 4 business days after the clinic has received the results. If you do not hear from us within 7 days, please contact the clinic through [employer] or phone. If you have a critical or abnormal lab result, we will notify you by phone as soon as possible.  Submit refill requests through [employer] or call your pharmacy and they will forward the refill request to us. Please allow 3 business days for your refill to be completed.          Additional Information About Your Visit        [employer] Information     [employer] gives you secure access to your electronic health record. If you see a primary care provider, you can also send messages to your care team and make appointments. If you have questions, please call your primary care clinic.  If you do not have a primary care  provider, please call [phone] and they will assist you.        Care EveryWhere ID     This is your Care EveryWhere ID. This could be used by other organizations to access your [place] medical records  [id number]        Your Vitals Were     Pulse                   71            Blood Pressure from Last 3 Encounters:   12/07/17 125/71   12/07/17 128/77   12/04/17 (!) 130/96    Weight from Last 3 Encounters:   12/07/17 95.5 kg (210 lb 9.6 oz)   11/24/17 94.3 kg (207 lb 12.8 oz)   11/22/17 93.9 kg (207 lb)              Today, you had the following     No orders found for display         Where to get your medicines      These medications were sent to [facility] [place], [place] - [address] [address]  [address] [address] [address]     Phone:  [phone]     venlafaxine 150 MG Tb24 24 hr tablet          Primary Care Provider Office Phone # Fax #    Anna Naidu [phone] [address] [facility] [address]        Equal Access to Services     DISHA [facility] HospitalANNE : Hadii aad ku hadasho Soomaali, waaxda luqadaha, qaybta kaalmada adeegyada, pancho buenon liliam castillo. So [facility] [phone].    ATENCIÓN: Si habla español, tiene a mcdonnell disposición servicios gratuitos de asistencia lingüística. [facility] [phone].    We comply with applicable federal civil rights laws and [place] laws. We do not discriminate on the basis of race, color, national origin, age, disability, sex, sexual orientation, or gender identity.            Thank you!     Thank you for choosing [facility]  for your care. Our goal is always to provide you with excellent care. Hearing back from our patients is one way we can continue to improve our services. Please take a few minutes to complete the written survey that you may receive in the mail after your visit with us. Thank you!             Your Updated Medication List - Protect others around you: Learn  "how to safely use, store and throw away your medicines at www.disposemymeds.org.          This list is accurate as of: 12/7/17  1:31 PM.  Always use your most recent med list.                   Brand Name Dispense Instructions for use Diagnosis    acetaminophen 325 MG tablet    TYLENOL    100 tablet    Take 2 tablets (650 mg) by mouth every 4 hours as needed for other (mild pain)    Malignant melanoma of left upper extremity including shoulder (H)       aspirin 81 MG EC tablet      Take 81 mg by mouth daily        cephALEXin 500 MG capsule    KEFLEX    24 capsule    Take 2 capsules (1,000 mg) by mouth 2 times daily    Cellulitis of chest wall       LORazepam 1 MG tablet    ATIVAN    30 tablet    Take 1 tablet (1 mg) by mouth every 8 hours as needed for anxiety    Anxiety, Acute intractable tension-type headache       order for DME     1 Units    Equipment being ordered: 20-30mmHg compression sleeve and 20-30mmHg compression glove\" x2 pair    Lymphedema of left arm       oxyCODONE IR 5 MG tablet    ROXICODONE    40 tablet    Take 1-3 tablets (5-15 mg) by mouth every 4 hours as needed for pain maximum 12 tablet(s) per day    Cellulitis of chest wall       rOPINIRole 0.25 MG tablet    REQUIP    30 tablet    Take 1 tablet (0.25 mg) by mouth At Bedtime    Restless leg syndrome       senna-docusate 8.6-50 MG per tablet    SENOKOT-S;PERICOLACE    60 tablet    Take 1 tablet by mouth 2 times daily as needed for constipation    Malignant melanoma of left upper extremity including shoulder (H)       venlafaxine 150 MG Tb24 24 hr tablet    EFFEXOR-ER    30 each    Take 1 tablet (150 mg) by mouth daily (with breakfast)    Mild major depression (H)       Vitamin D3 3000 UNITS Tabs     30 tablet    Take 3,000 Int'l Units by mouth daily    Vitamin D deficiency       zonisamide 25 MG capsule    Zonegran    90 capsule    Take 1 tablet (25 mg) once daily for 1 week, then 2 tablets once daily for 1 week, then 3 tablets once daily for 1 " week, then 4 tablets once daily for 1 week.    Headache disorder

## 2017-12-08 NOTE — PROGRESS NOTES
"Hematology/ Oncology Follow-up Visit:  Dec 7, 2017    Reason for Visit:   Chief Complaint   Patient presents with     Oncology Clinic Visit     2 week recheck Metastatic malignant melanoma        Oncologic History:  Metastatic melanoma.    Interval History:  Patient had her first cycle of immunotherapy with opdivo 2 weeks ago. She tolerated treatment very well without significant side effects. She denies any nausea or vomiting or diarrhea. She denies any fever or chills. She denies any skin rash or joint aches or pains.    Review Of Systems:  Constitutional: Negative for fever, chills, and night sweats.  Skin: negative.  Eyes: negative.  Ears/Nose/Throat: negative.  Respiratory: No shortness of breath, dyspnea on exertion, cough, or hemoptysis.  Cardiovascular: negative.  Gastrointestinal: negative.  Genitourinary: negative.  Musculoskeletal: negative.  Neurologic: negative.  Psychiatric: negative.  Hematologic/Lymphatic/Immunologic: negative.  Endocrine: negative.    All other ROS negative unless mentioned in interval history.    Past medical, social, surgical, and family histories reviewed.    Allergies:  Allergies as of 12/07/2017 - Nicola as Reviewed 12/07/2017   Allergen Reaction Noted     Compazine [prochlorperazine] Fatigue 10/12/2017     Fentanyl Other (See Comments) 10/12/2017     Erythrocin Nausea and Vomiting 03/20/2008     Zithromax [azithromycin dihydrate] Diarrhea 02/17/2012       Current Medications:  Current Outpatient Prescriptions   Medication Sig Dispense Refill     venlafaxine (EFFEXOR-ER) 150 MG TB24 24 hr tablet Take 1 tablet (150 mg) by mouth daily (with breakfast) 30 each 0     order for DME Equipment being ordered: 20-30mmHg compression sleeve and 20-30mmHg compression glove\" x2 pair 1 Units 0     rOPINIRole (REQUIP) 0.25 MG tablet Take 1 tablet (0.25 mg) by mouth At Bedtime 30 tablet 11     cephALEXin (KEFLEX) 500 MG capsule Take 2 capsules (1,000 mg) by mouth 2 times daily 24 capsule 0     " "oxyCODONE IR (ROXICODONE) 5 MG tablet Take 1-3 tablets (5-15 mg) by mouth every 4 hours as needed for pain maximum 12 tablet(s) per day 40 tablet 0     aspirin 81 MG EC tablet Take 81 mg by mouth daily       acetaminophen (TYLENOL) 325 MG tablet Take 2 tablets (650 mg) by mouth every 4 hours as needed for other (mild pain) 100 tablet 0     senna-docusate (SENOKOT-S;PERICOLACE) 8.6-50 MG per tablet Take 1 tablet by mouth 2 times daily as needed for constipation 60 tablet 1     LORazepam (ATIVAN) 1 MG tablet Take 1 tablet (1 mg) by mouth every 8 hours as needed for anxiety 30 tablet 0     Cholecalciferol (VITAMIN D3) 3000 UNITS TABS Take 3,000 Int'l Units by mouth daily 30 tablet 3     zonisamide (ZONEGRAN) 25 MG capsule Take 1 tablet (25 mg) once daily for 1 week, then 2 tablets once daily for 1 week, then 3 tablets once daily for 1 week, then 4 tablets once daily for 1 week. 90 capsule 1        Physical Exam:  /77 (BP Location: Right arm, Patient Position: Sitting, Cuff Size: Adult Large)  Pulse 86  Temp 96.8  F (36  C) (Tympanic)  Resp 16  Ht 1.6 m (5' 2.99\")  Wt 95.5 kg (210 lb 9.6 oz)  SpO2 97%  Breastfeeding? No  BMI 37.32 kg/m2  Wt Readings from Last 12 Encounters:   12/07/17 95.5 kg (210 lb 9.6 oz)   11/24/17 94.3 kg (207 lb 12.8 oz)   11/22/17 93.9 kg (207 lb)   11/14/17 95.9 kg (211 lb 6.7 oz)   11/09/17 94.8 kg (208 lb 14.4 oz)   11/02/17 96.1 kg (211 lb 12.8 oz)   11/02/17 95.8 kg (211 lb 4.8 oz)   10/27/17 95.8 kg (211 lb 1.6 oz)   10/23/17 94.5 kg (208 lb 5.4 oz)   10/18/17 94.7 kg (208 lb 12.8 oz)   10/13/17 94.6 kg (208 lb 8 oz)   10/12/17 93.9 kg (207 lb)     ECOG performance status: 0  GENERAL APPEARANCE: Healthy, alert and in no acute distress.  HEENT: Sclerae anicteric. PERRLA. Oropharynx without ulcers, lesions, or thrush.  NECK: Supple. No asymmetry or masses.  LYMPHATICS: No palpable cervical, supraclavicular, axillary, or inguinal lymphadenopathy.  RESP: Lungs clear to " auscultation bilaterally without rales, rhonchi or wheezes.  CARDIOVASCULAR: Regular rate and rhythm. Normal S1, S2; no S3 or S4. No murmur, gallop, or rub.  ABDOMEN: Soft, nontender. Bowel sounds normal. No palpable organomegaly or masses.  MUSCULOSKELETAL: Extremities without gross deformities noted. No edema of bilateral lower extremities.  SKIN: No suspicious lesions or rashes.  NEURO: Alert and oriented x 3. Cranial nerves II-XII grossly intact.  PSYCHIATRIC: Mentation and affect appear normal.    Laboratory/Imaging Studies:  Infusion Therapy Visit on 11/24/2017   Component Date Value Ref Range Status     Sodium 11/24/2017 137  133 - 144 mmol/L Final     Potassium 11/24/2017 4.5  3.4 - 5.3 mmol/L Final     Chloride 11/24/2017 107  94 - 109 mmol/L Final     Carbon Dioxide 11/24/2017 22  20 - 32 mmol/L Final     Anion Gap 11/24/2017 8  3 - 14 mmol/L Final     Glucose 11/24/2017 98  70 - 99 mg/dL Final     Urea Nitrogen 11/24/2017 13  7 - 30 mg/dL Final     Creatinine 11/24/2017 0.77  0.52 - 1.04 mg/dL Final     GFR Estimate 11/24/2017 83  >60 mL/min/1.7m2 Final    Non  GFR Calc     GFR Estimate If Black 11/24/2017 >90  >60 mL/min/1.7m2 Final    African American GFR Calc     Calcium 11/24/2017 9.0  8.5 - 10.1 mg/dL Final     Bilirubin Total 11/24/2017 0.2  0.2 - 1.3 mg/dL Final     Albumin 11/24/2017 3.8  3.4 - 5.0 g/dL Final     Protein Total 11/24/2017 7.9  6.8 - 8.8 g/dL Final     Alkaline Phosphatase 11/24/2017 68  40 - 150 U/L Final     ALT 11/24/2017 42  0 - 50 U/L Final     AST 11/24/2017 25  0 - 45 U/L Final     TSH 11/24/2017 2.41  0.40 - 4.00 mU/L Final        Recent Results (from the past 744 hour(s))   US Extremity Non Vascular Left    Narrative    US EXTREMITY NON VASCULAR LEFT 11/14/2017 8:55 PM     HISTORY: Left axillary cellulitis with concerns for abscess.      FINDINGS: Ultrasound in the area of patient's cellulitis left axillary  region is performed. A small complex appearing  fluid collection is  noted in the area of patient's cellulitis concerning for abscess  measuring 2.0 x 0.8 x 1.0 cm.      Impression    IMPRESSION: Small abscess left axilla in area of patient's cellulitis.    ALEX RIVERA MD   US Axillary Left    Narrative    US AXILLARY LEFT 12/4/2017 12:49 PM    HISTORY: recent lymp node biopsy 10/23/17 with compliation of  secondary infection, lymphedema, now with concern over increasing  swelling, rule out abscess;       Impression    IMPRESSION: Hypoechoic area at the surgical site measuring 3.7 x 2.4  cm which could be organizing hematoma. An abscess is difficult to  exclude. Tract from the skin extends to this area.    SHEN LYNNE MD       Assessment and plan:  (C79.9) Metastatic malignant melanoma (H)  (primary encounter diagnosis)  Patient tolerated adjuvant immunotherapy was opdivo well. We will proceed with second cycle today. I will see the patient again in one month or sooner if there are new developments or concerns.    (F32.0) Mild major depression (H)  Plan: venlafaxine (EFFEXOR-ER) 150 MG TB24 24 hr  tablet    (J45.20) Mild intermittent asthma without complication  Patient has met is currently stable.      The patient is ready to learn, no apparent learning barriers were identified.  Diagnosis and treatment plans were explained to the patient. The patient expressed understanding of the content. The patient asked appropriate questions. The patient questions were answered to her satisfaction.    Chart documentation with Dragon Voice recognition Software. Although reviewed after completion, some words and grammatical errors may remain.

## 2017-12-11 ENCOUNTER — DOCUMENTATION ONLY (OUTPATIENT)
Dept: ONCOLOGY | Facility: CLINIC | Age: 42
End: 2017-12-11

## 2017-12-11 NOTE — PROGRESS NOTES
Form from [employer] Received today requesting medical records from September 1, 2017-present.  Request faxed to our medical records department ([phone]).

## 2017-12-15 NOTE — PROGRESS NOTES
Clinic Care Coordination Contact  [facility]/Voicemail    Referral Source: IP/TCU Report  Clinical Data: Care Coordinator Outreach  Outreach attempted x 2.  Left message on voicemail with call back information and requested return call.  Plan: Care Coordinator mailed out care coordination introduction letter on 11-20. Care Coordinator will do no further outreaches at this time.  Сергей Mott RN  Clinic Care Coordinator  [phone] or [phone]

## 2017-12-19 ENCOUNTER — HOSPITAL ENCOUNTER (OUTPATIENT)
Dept: PHYSICAL THERAPY | Facility: CLINIC | Age: 42
Setting detail: THERAPIES SERIES
End: 2017-12-19
Attending: PHYSICIAN ASSISTANT
Payer: COMMERCIAL

## 2017-12-19 PROCEDURE — 97140 MANUAL THERAPY 1/> REGIONS: CPT | Mod: GP | Performed by: PHYSICAL THERAPIST

## 2017-12-19 PROCEDURE — 40000099 ZZH STATISTIC LYMPHEDEMA VISIT: Performed by: PHYSICAL THERAPIST

## 2017-12-21 ENCOUNTER — INFUSION THERAPY VISIT (OUTPATIENT)
Dept: INFUSION THERAPY | Facility: CLINIC | Age: 42
End: 2017-12-21
Attending: INTERNAL MEDICINE
Payer: COMMERCIAL

## 2017-12-21 ENCOUNTER — HOSPITAL ENCOUNTER (OUTPATIENT)
Dept: PHYSICAL THERAPY | Facility: CLINIC | Age: 42
Setting detail: THERAPIES SERIES
End: 2017-12-21
Attending: PHYSICIAN ASSISTANT
Payer: COMMERCIAL

## 2017-12-21 ENCOUNTER — HOSPITAL ENCOUNTER (OUTPATIENT)
Dept: LAB | Facility: CLINIC | Age: 42
Discharge: HOME | End: 2017-12-21
Attending: INTERNAL MEDICINE | Admitting: INTERNAL MEDICINE
Payer: COMMERCIAL

## 2017-12-21 VITALS — HEART RATE: 80 BPM | TEMPERATURE: 97.4 F | DIASTOLIC BLOOD PRESSURE: 62 MMHG | SYSTOLIC BLOOD PRESSURE: 114 MMHG

## 2017-12-21 DIAGNOSIS — C43.62 MALIGNANT MELANOMA OF LEFT UPPER EXTREMITY INCLUDING SHOULDER (H): Primary | ICD-10-CM

## 2017-12-21 LAB
ALBUMIN SERPL-MCNC: 3.9 G/DL (ref 3.4–5)
ALBUMIN SERPL-MCNC: NORMAL G/DL (ref 3.4–5)
ALP SERPL-CCNC: 64 U/L (ref 40–150)
ALP SERPL-CCNC: NORMAL U/L (ref 40–150)
ALT SERPL W P-5'-P-CCNC: 32 U/L (ref 0–50)
ALT SERPL W P-5'-P-CCNC: NORMAL U/L (ref 0–50)
ANION GAP SERPL CALCULATED.3IONS-SCNC: 7 MMOL/L (ref 3–14)
ANION GAP SERPL CALCULATED.3IONS-SCNC: NORMAL MMOL/L (ref 6–17)
AST SERPL W P-5'-P-CCNC: 20 U/L (ref 0–45)
AST SERPL W P-5'-P-CCNC: NORMAL U/L (ref 0–45)
BILIRUB SERPL-MCNC: 0.2 MG/DL (ref 0.2–1.3)
BILIRUB SERPL-MCNC: NORMAL MG/DL (ref 0.2–1.3)
BUN SERPL-MCNC: 15 MG/DL (ref 7–30)
BUN SERPL-MCNC: NORMAL MG/DL (ref 7–30)
CALCIUM SERPL-MCNC: 8.8 MG/DL (ref 8.5–10.1)
CALCIUM SERPL-MCNC: NORMAL MG/DL (ref 8.5–10.1)
CHLORIDE SERPL-SCNC: 105 MMOL/L (ref 94–109)
CHLORIDE SERPL-SCNC: NORMAL MMOL/L (ref 94–109)
CO2 SERPL-SCNC: 24 MMOL/L (ref 20–32)
CO2 SERPL-SCNC: NORMAL MMOL/L (ref 20–32)
CREAT SERPL-MCNC: 0.64 MG/DL (ref 0.52–1.04)
CREAT SERPL-MCNC: NORMAL MG/DL (ref 0.52–1.04)
GFR SERPL CREATININE-BSD FRML MDRD: >90 ML/MIN/1.7M2
GFR SERPL CREATININE-BSD FRML MDRD: NORMAL ML/MIN/1.7M2
GLUCOSE SERPL-MCNC: 94 MG/DL (ref 70–99)
GLUCOSE SERPL-MCNC: NORMAL MG/DL (ref 70–99)
POTASSIUM SERPL-SCNC: 4 MMOL/L (ref 3.4–5.3)
POTASSIUM SERPL-SCNC: NORMAL MMOL/L (ref 3.4–5.3)
PROT SERPL-MCNC: 7.9 G/DL (ref 6.8–8.8)
PROT SERPL-MCNC: NORMAL G/DL (ref 6.8–8.8)
SODIUM SERPL-SCNC: 136 MMOL/L (ref 133–144)
SODIUM SERPL-SCNC: NORMAL MMOL/L (ref 133–144)
TSH SERPL DL<=0.005 MIU/L-ACNC: 0.96 MU/L (ref 0.4–4)
TSH SERPL DL<=0.005 MIU/L-ACNC: NORMAL MU/L (ref 0.4–4)

## 2017-12-21 PROCEDURE — 80053 COMPREHEN METABOLIC PANEL: CPT | Performed by: INTERNAL MEDICINE

## 2017-12-21 PROCEDURE — 25000128 H RX IP 250 OP 636: Performed by: INTERNAL MEDICINE

## 2017-12-21 PROCEDURE — 97140 MANUAL THERAPY 1/> REGIONS: CPT | Mod: GP | Performed by: PHYSICAL THERAPIST

## 2017-12-21 PROCEDURE — 84443 ASSAY THYROID STIM HORMONE: CPT | Performed by: INTERNAL MEDICINE

## 2017-12-21 PROCEDURE — 40000099 ZZH STATISTIC LYMPHEDEMA VISIT: Performed by: PHYSICAL THERAPIST

## 2017-12-21 PROCEDURE — 96413 CHEMO IV INFUSION 1 HR: CPT

## 2017-12-21 PROCEDURE — 36415 COLL VENOUS BLD VENIPUNCTURE: CPT | Performed by: INTERNAL MEDICINE

## 2017-12-21 RX ORDER — EPINEPHRINE 0.3 MG/.3ML
0.3 INJECTION SUBCUTANEOUS EVERY 5 MIN PRN
Status: CANCELLED | OUTPATIENT
Start: 2018-01-04

## 2017-12-21 RX ORDER — MEPERIDINE HYDROCHLORIDE 25 MG/ML
25 INJECTION INTRAMUSCULAR; INTRAVENOUS; SUBCUTANEOUS EVERY 30 MIN PRN
Status: CANCELLED | OUTPATIENT
Start: 2018-01-04

## 2017-12-21 RX ORDER — ALBUTEROL SULFATE 0.83 MG/ML
2.5 SOLUTION RESPIRATORY (INHALATION)
Status: CANCELLED | OUTPATIENT
Start: 2017-12-21

## 2017-12-21 RX ORDER — ALBUTEROL SULFATE 0.83 MG/ML
2.5 SOLUTION RESPIRATORY (INHALATION)
Status: CANCELLED | OUTPATIENT
Start: 2018-01-04

## 2017-12-21 RX ORDER — LORAZEPAM 2 MG/ML
0.5 INJECTION INTRAMUSCULAR EVERY 4 HOURS PRN
Status: CANCELLED
Start: 2018-01-04

## 2017-12-21 RX ORDER — METHYLPREDNISOLONE SODIUM SUCCINATE 125 MG/2ML
125 INJECTION, POWDER, LYOPHILIZED, FOR SOLUTION INTRAMUSCULAR; INTRAVENOUS
Status: CANCELLED
Start: 2018-01-04

## 2017-12-21 RX ORDER — LORAZEPAM 2 MG/ML
0.5 INJECTION INTRAMUSCULAR EVERY 4 HOURS PRN
Status: CANCELLED
Start: 2017-12-21

## 2017-12-21 RX ORDER — EPINEPHRINE 1 MG/ML
0.3 INJECTION, SOLUTION, CONCENTRATE INTRAVENOUS EVERY 5 MIN PRN
Status: CANCELLED | OUTPATIENT
Start: 2018-01-04

## 2017-12-21 RX ORDER — ALBUTEROL SULFATE 90 UG/1
1-2 AEROSOL, METERED RESPIRATORY (INHALATION)
Status: CANCELLED
Start: 2017-12-21

## 2017-12-21 RX ORDER — ALBUTEROL SULFATE 90 UG/1
1-2 AEROSOL, METERED RESPIRATORY (INHALATION)
Status: CANCELLED
Start: 2018-01-04

## 2017-12-21 RX ORDER — SODIUM CHLORIDE 9 MG/ML
1000 INJECTION, SOLUTION INTRAVENOUS CONTINUOUS PRN
Status: CANCELLED
Start: 2017-12-21

## 2017-12-21 RX ORDER — SODIUM CHLORIDE 9 MG/ML
1000 INJECTION, SOLUTION INTRAVENOUS CONTINUOUS PRN
Status: CANCELLED
Start: 2018-01-04

## 2017-12-21 RX ORDER — EPINEPHRINE 1 MG/ML
0.3 INJECTION, SOLUTION, CONCENTRATE INTRAVENOUS EVERY 5 MIN PRN
Status: CANCELLED | OUTPATIENT
Start: 2017-12-21

## 2017-12-21 RX ORDER — DIPHENHYDRAMINE HYDROCHLORIDE 50 MG/ML
50 INJECTION INTRAMUSCULAR; INTRAVENOUS
Status: CANCELLED
Start: 2018-01-04

## 2017-12-21 RX ORDER — MEPERIDINE HYDROCHLORIDE 25 MG/ML
25 INJECTION INTRAMUSCULAR; INTRAVENOUS; SUBCUTANEOUS EVERY 30 MIN PRN
Status: CANCELLED | OUTPATIENT
Start: 2017-12-21

## 2017-12-21 RX ORDER — EPINEPHRINE 0.3 MG/.3ML
0.3 INJECTION SUBCUTANEOUS EVERY 5 MIN PRN
Status: CANCELLED | OUTPATIENT
Start: 2017-12-21

## 2017-12-21 RX ORDER — METHYLPREDNISOLONE SODIUM SUCCINATE 125 MG/2ML
125 INJECTION, POWDER, LYOPHILIZED, FOR SOLUTION INTRAMUSCULAR; INTRAVENOUS
Status: CANCELLED
Start: 2017-12-21

## 2017-12-21 RX ORDER — DIPHENHYDRAMINE HYDROCHLORIDE 50 MG/ML
50 INJECTION INTRAMUSCULAR; INTRAVENOUS
Status: CANCELLED
Start: 2017-12-21

## 2017-12-21 RX ADMIN — SODIUM CHLORIDE 280 MG: 9 INJECTION, SOLUTION INTRAVENOUS at 13:40

## 2017-12-21 NOTE — MR AVS SNAPSHOT
After Visit Summary   12/21/2017    Doretha Fernandez    MRN: [id number]           Patient Information     Date Of Birth          1975        Visit Information        Provider Department      12/21/2017 11:00 AM ROOM 10 [facility]        Today's Diagnoses     Malignant melanoma of left upper extremity including shoulder (H)    -  1       Follow-ups after your visit        Your next 10 appointments already scheduled     Dec 27, 2017  9:00 AM CST   Lymphedema Treatment with Anastasiya Rodriguez PTA   [facility] ([facility])    [address] [address]   [phone]            Dec 29, 2017  9:15 AM CST   Lymphedema Treatment with Anastasiya Rodriguez PTA   [facility] ([facility])    [address] [address]   [phone]            Jan 02, 2018  1:00 PM CST   Lymphedema Treatment with Anna Graham PT   [facility] ([facility])    [address] [address]   [phone]            Jan 04, 2018  1:30 PM CST   Level 2 with ROOM 9 [facility] ([facility])    [facility]  [address]  [facility] [address]   [phone]            Jan 05, 2018  2:00 PM CST   Lymphedema Treatment with Anastasiya Rodriguez PTA   [facility] ([facility])    [address] [address]   [phone]            Jan 09, 2018  9:00 AM CST   Lymphedema Treatment with Anastasiya Rodriguez PTA   [facility] ([facility])    [address] [address]   [phone]            Jan 09, 2018 10:00 AM CST   Return Visit with Lowell Bullock MD   [facility]    [address] [address]   [phone]            Jan 11, 2018  3:30 PM CST    Lymphedema Treatment with Anastasiya Rodriguez PTA   [facility] ([facility])    [address]  [facility] [address]   [phone]            Jan 18, 2018 10:30 AM CST   Return Visit with Kathy Richards MD   [facility] ([facility])    [facility]  [address]  [facility] [address]   [phone]            Jan 18, 2018 11:00 AM CST   Level 2 with ROOM 10 [facility] ([facility])    [facility]  [address]  [facility] [address]   [phone]              Who to contact     If you have questions or need follow up information about today's clinic visit or your schedule please contact [facility] directly at [phone].  Normal or non-critical lab and imaging results will be communicated to you by MyChart, letter or phone within 4 business days after the clinic has received the results. If you do not hear from us within 7 days, please contact the clinic through [employer] or phone. If you have a critical or abnormal lab result, we will notify you by phone as soon as possible.  Submit refill requests through [employer] or call your pharmacy and they will forward the refill request to us. Please allow 3 business days for your refill to be completed.          Additional Information About Your Visit        [employer] Consult Information     [employer] gives you secure access to your electronic health record. If you see a primary care provider, you can also send messages to your care team and make appointments. If you have questions, please call your primary care clinic.  If you do not have a primary care provider, please call [phone] and they will assist you.        Care EveryWhere ID     This is your Care EveryWhere ID. This could be used by other organizations to access your [place] medical records  [id number]        Your Vitals Were     Pulse  Temperature                80 97.4  F (36.3  C) (Oral)           Blood Pressure from Last 3 Encounters:   12/21/17 114/62   12/07/17 125/71   12/07/17 128/77    Weight from Last 3 Encounters:   12/07/17 95.5 kg (210 lb 9.6 oz)   11/24/17 94.3 kg (207 lb 12.8 oz)   11/22/17 93.9 kg (207 lb)              We Performed the Following     Comprehensive metabolic panel     Comprehensive metabolic panel     TSH with free T4 reflex     TSH with free T4 reflex        Primary Care Provider Office Phone # Fax #    Anna Naidu [phone] [address] [facility] [address]        Equal Access to Services     BRIGETTE QUARLES : Nicol Gustafson, wabryan valencia, qaybta kaalmada chris, pancho castillo. So [facility] [phone].    ATENCIÓN: Si habla español, tiene a mcdonnell disposición servicios gratuitos de asistencia lingüística. Llame al [phone].    We comply with applicable federal civil rights laws and [place] laws. We do not discriminate on the basis of race, color, national origin, age, disability, sex, sexual orientation, or gender identity.            Thank you!     Thank you for choosing [facility]  for your care. Our goal is always to provide you with excellent care. Hearing back from our patients is one way we can continue to improve our services. Please take a few minutes to complete the written survey that you may receive in the mail after your visit with us. Thank you!             Your Updated Medication List - Protect others around you: Learn how to safely use, store and throw away your medicines at www.disposemymeds.org.          This list is accurate as of: 12/21/17  2:55 PM.  Always use your most recent med list.                   Brand Name Dispense Instructions for use Diagnosis    acetaminophen 325 MG tablet    TYLENOL    100 tablet    Take 2 tablets (650 mg) by mouth every 4 hours as needed for other (mild pain)    Malignant  "melanoma of left upper extremity including shoulder (H)       aspirin 81 MG EC tablet      Take 81 mg by mouth daily        cephALEXin 500 MG capsule    KEFLEX    24 capsule    Take 2 capsules (1,000 mg) by mouth 2 times daily    Cellulitis of chest wall       LORazepam 1 MG tablet    ATIVAN    30 tablet    Take 1 tablet (1 mg) by mouth every 8 hours as needed for anxiety    Anxiety, Acute intractable tension-type headache       order for DME     1 Units    Equipment being ordered: 20-30mmHg compression sleeve and 20-30mmHg compression glove\" x2 pair    Lymphedema of left arm       oxyCODONE IR 5 MG tablet    ROXICODONE    40 tablet    Take 1-3 tablets (5-15 mg) by mouth every 4 hours as needed for pain maximum 12 tablet(s) per day    Cellulitis of chest wall       rOPINIRole 0.25 MG tablet    REQUIP    30 tablet    Take 1 tablet (0.25 mg) by mouth At Bedtime    Restless leg syndrome       senna-docusate 8.6-50 MG per tablet    SENOKOT-S;PERICOLACE    60 tablet    Take 1 tablet by mouth 2 times daily as needed for constipation    Malignant melanoma of left upper extremity including shoulder (H)       venlafaxine 150 MG Tb24 24 hr tablet    EFFEXOR-ER    30 each    Take 1 tablet (150 mg) by mouth daily (with breakfast)    Mild major depression (H)       Vitamin D3 3000 UNITS Tabs     30 tablet    Take 3,000 Int'l Units by mouth daily    Vitamin D deficiency       zonisamide 25 MG capsule    Zonegran    90 capsule    Take 1 tablet (25 mg) once daily for 1 week, then 2 tablets once daily for 1 week, then 3 tablets once daily for 1 week, then 4 tablets once daily for 1 week.    Headache disorder         "

## 2017-12-21 NOTE — PROGRESS NOTES
Infusion Nursing Note:  Doretha Fernandez presents today for Opdivo.    Patient seen by provider today: No   present during visit today: Not Applicable.    Note: N/A.    Intravenous Access:  Peripheral IV placed.    Treatment Conditions:  Results reviewed, labs MET treatment parameters, ok to proceed with treatment.      Post Infusion Assessment:  Patient tolerated infusion without incident.  Blood return noted pre and post infusion.  Site patent and intact, free from redness, edema or discomfort.  No evidence of extravasations.  Access discontinued per protocol.    Discharge Plan:   Patient discharged in stable condition accompanied by: self.    Chantelle Black RN

## 2017-12-27 ENCOUNTER — HOSPITAL ENCOUNTER (OUTPATIENT)
Dept: PHYSICAL THERAPY | Facility: CLINIC | Age: 42
Setting detail: THERAPIES SERIES
End: 2017-12-27
Attending: PHYSICIAN ASSISTANT
Payer: COMMERCIAL

## 2017-12-27 PROCEDURE — 40000099 ZZH STATISTIC LYMPHEDEMA VISIT: Performed by: REHABILITATION PRACTITIONER

## 2017-12-27 PROCEDURE — 97140 MANUAL THERAPY 1/> REGIONS: CPT | Mod: GP | Performed by: REHABILITATION PRACTITIONER

## 2017-12-29 ENCOUNTER — HOSPITAL ENCOUNTER (OUTPATIENT)
Dept: PHYSICAL THERAPY | Facility: CLINIC | Age: 42
Setting detail: THERAPIES SERIES
End: 2017-12-29
Attending: PHYSICIAN ASSISTANT
Payer: COMMERCIAL

## 2017-12-29 PROCEDURE — 40000099 ZZH STATISTIC LYMPHEDEMA VISIT: Performed by: REHABILITATION PRACTITIONER

## 2017-12-29 PROCEDURE — 97140 MANUAL THERAPY 1/> REGIONS: CPT | Mod: GP | Performed by: REHABILITATION PRACTITIONER

## 2018-01-03 NOTE — PROGRESS NOTES
Outpatient Physical Therapy Progress Note     Patient: Doretha Fernandez  : 1975    Beginning/End Dates of Reporting Period:  2017 to 2017    Referring Provider: Jessica Castellano PA-C    Therapy Diagnosis: LUE and L-breast lymphedema      Client Self Report: pt states feel swollen mainly in upper arm    Objective Measurements:  Objective Measure: LUE girth  Details: +7.97%      Outcome Measures (most recent score):   LLIS = 41 on date of initial evaluation; pt will again complete LLIS at time of discharge       Goals:  Goal Identifier stg   Goal Description pt to be independent with self-MLD of LUE for decreased edema reduction response and improved comfort with use   Target Date 17   Date Met  17   Progress:     Goal Identifier stg   Goal Description pt to be independent with donning, doffing and care of compression sleeve and glove for LUE management during upcoming flight/vacation   Target Date 17   Date Met  17   Progress:     Goal Identifier ltg   Goal Description pt to have increased LUE GH flexion and abduction to 130 degrees to increase use of LUE for overhead activities and ADLs   Target Date 18   Date Met      Progress:     Goal Identifier ltg   Goal Description pt to be independent with LUE lymphedema management via HEP, elevation, compression garment wear and self-MLD   Target Date 18   Date Met      Progress:     Goal Identifier ltg   Goal Description pt to have at least 8 point improvement on LLIS due to decreased lymphedema and increased comfort and ROM in LUE   Target Date 18   Date Met      Progress:       Progress Toward Goals:   Patient is making fair progress. LUE has slowly begun to increase overtime so needing to be more aggressive and add daily compression as well as pursuing Flexitouch compression pump as pt will require more compression and lymph draining (self-MLD or pump) longterm. Pt will continue to benefit from skilled lymphedema  therapy/treatment at clinic to decrease LUE and L-breast girth/size as well as establish optimal home program for longterm management for maintenance.         Plan:  Continue therapy per current plan of care.    Discharge:  No

## 2018-01-03 NOTE — ADDENDUM NOTE
Encounter addended by: Anna Graham, PT on: 1/3/2018  8:21 AM<BR>     Actions taken: Flowsheet accepted, Sign clinical note

## 2018-01-03 NOTE — ADDENDUM NOTE
Encounter addended by: Anna Graham, PT on: 1/3/2018  8:21 AM<BR>     Actions taken: Flowsheet accepted

## 2018-01-04 ENCOUNTER — INFUSION THERAPY VISIT (OUTPATIENT)
Dept: INFUSION THERAPY | Facility: CLINIC | Age: 43
End: 2018-01-04
Attending: INTERNAL MEDICINE
Payer: COMMERCIAL

## 2018-01-04 DIAGNOSIS — C43.62 MALIGNANT MELANOMA OF LEFT UPPER EXTREMITY INCLUDING SHOULDER (H): Primary | ICD-10-CM

## 2018-01-04 PROCEDURE — 25000128 H RX IP 250 OP 636: Performed by: INTERNAL MEDICINE

## 2018-01-04 PROCEDURE — 96413 CHEMO IV INFUSION 1 HR: CPT

## 2018-01-04 RX ADMIN — SODIUM CHLORIDE 280 MG: 9 INJECTION, SOLUTION INTRAVENOUS at 14:11

## 2018-01-04 RX ADMIN — SODIUM CHLORIDE 500 ML: 9 INJECTION, SOLUTION INTRAVENOUS at 14:11

## 2018-01-04 NOTE — PROGRESS NOTES
Infusion Nursing Note:  Doretha Fernandez presents today for Opdivo.    Patient seen by provider today: No   present during visit today: Not Applicable.    Note: N/A.    Intravenous Access:  Peripheral IV placed.    Treatment Conditions:  Not Applicable.      Post Infusion Assessment:  Patient tolerated infusion without incident.    Discharge Plan:   Patient discharged in stable condition accompanied by: self.    Branden Connelly RN

## 2018-01-04 NOTE — MR AVS SNAPSHOT
After Visit Summary   1/4/2018    Doretha Fernandez    MRN: [id number]           Patient Information     Date Of Birth          1975        Visit Information        Provider Department      1/4/2018 1:30 PM ROOM 9 [facility]        Today's Diagnoses     Malignant melanoma of left upper extremity including shoulder (H)    -  1       Follow-ups after your visit        Your next 10 appointments already scheduled     Jan 05, 2018  2:00 PM CST   Lymphedema Treatment with Anastasiya Rodriguez PTA   [facility] ([facility])    [address] [address]   [phone]            Jan 09, 2018  9:00 AM CST   Lymphedema Treatment with Anastasiya Rodriguez PTA   [facility] ([facility])    [address] [address]   [phone]            Jan 09, 2018 10:00 AM CST   Return Visit with Lowell Bullock MD   [facility] ([facility])    [address] [address]   [phone]            Jan 11, 2018  3:30 PM CST   Lymphedema Treatment with Anastasiya Rodriguez PTA   [facility] ([facility])    [address] [address]   [phone]            Jan 18, 2018 10:30 AM CST   Return Visit with Kathy Richards MD   [facility] ([facility])    [facility]  [address]  [facility] [address]   [phone]            Jan 18, 2018 11:00 AM CST   Level 2 with ROOM 10 [facility] ([facility])    [facility]  [address]  [place] [address]   [phone]            Feb 01, 2018  1:30 PM CST   Level 2 with ROOM 5 [facility] ([facility])    [facility]  [address]  [facility] [address]   [phone]              Who to  contact     If you have questions or need follow up information about today's clinic visit or your schedule please contact [facility] directly at [phone].  Normal or non-critical lab and imaging results will be communicated to you by MyChart, letter or phone within 4 business days after the clinic has received the results. If you do not hear from us within 7 days, please contact the clinic through [employer] or phone. If you have a critical or abnormal lab result, we will notify you by phone as soon as possible.  Submit refill requests through [employer] or call your pharmacy and they will forward the refill request to us. Please allow 3 business days for your refill to be completed.          Additional Information About Your Visit        [employer] Information     [employer] gives you secure access to your electronic health record. If you see a primary care provider, you can also send messages to your care team and make appointments. If you have questions, please call your primary care clinic.  If you do not have a primary care provider, please call [phone] and they will assist you.        Care EveryWhere ID     This is your Care EveryWhere ID. This could be used by other organizations to access your [place] medical records  [id number]         Blood Pressure from Last 3 Encounters:   12/21/17 114/62   12/07/17 125/71   12/07/17 128/77    Weight from Last 3 Encounters:   12/07/17 95.5 kg (210 lb 9.6 oz)   11/24/17 94.3 kg (207 lb 12.8 oz)   11/22/17 93.9 kg (207 lb)              Today, you had the following     No orders found for display       Primary Care Provider Office Phone # Fax #    Anna Naidu [phone] [address] [address]        Equal Access to Services     [facility] : Hadalysia Gustafson, wabryan lujoni, qaybta kaalmapancho murray. So [facility] [phone].    ATENCIÓN: chong Xie  "a mcdonnell disposición servicios gratuitos de asistencia lingüística. Diana cole [phone].    We comply with applicable federal civil rights laws and [place] laws. We do not discriminate on the basis of race, color, national origin, age, disability, sex, sexual orientation, or gender identity.            Thank you!     Thank you for choosing [facility]  for your care. Our goal is always to provide you with excellent care. Hearing back from our patients is one way we can continue to improve our services. Please take a few minutes to complete the written survey that you may receive in the mail after your visit with us. Thank you!             Your Updated Medication List - Protect others around you: Learn how to safely use, store and throw away your medicines at www.disposemymeds.org.          This list is accurate as of: 1/4/18  3:24 PM.  Always use your most recent med list.                   Brand Name Dispense Instructions for use Diagnosis    acetaminophen 325 MG tablet    TYLENOL    100 tablet    Take 2 tablets (650 mg) by mouth every 4 hours as needed for other (mild pain)    Malignant melanoma of left upper extremity including shoulder (H)       aspirin 81 MG EC tablet      Take 81 mg by mouth daily        cephALEXin 500 MG capsule    KEFLEX    24 capsule    Take 2 capsules (1,000 mg) by mouth 2 times daily    Cellulitis of chest wall       LORazepam 1 MG tablet    ATIVAN    30 tablet    Take 1 tablet (1 mg) by mouth every 8 hours as needed for anxiety    Anxiety, Acute intractable tension-type headache       order for DME     1 Units    Equipment being ordered: 20-30mmHg compression sleeve and 20-30mmHg compression glove\" x2 pair    Lymphedema of left arm       oxyCODONE IR 5 MG tablet    ROXICODONE    40 tablet    Take 1-3 tablets (5-15 mg) by mouth every 4 hours as needed for pain maximum 12 tablet(s) per day    Cellulitis of chest wall       rOPINIRole 0.25 MG tablet    REQUIP    30 tablet    Take " 1 tablet (0.25 mg) by mouth At Bedtime    Restless leg syndrome       senna-docusate 8.6-50 MG per tablet    SENOKOT-S;PERICOLACE    60 tablet    Take 1 tablet by mouth 2 times daily as needed for constipation    Malignant melanoma of left upper extremity including shoulder (H)       venlafaxine 150 MG Tb24 24 hr tablet    EFFEXOR-ER    30 each    Take 1 tablet (150 mg) by mouth daily (with breakfast)    Mild major depression (H)       Vitamin D3 3000 UNITS Tabs     30 tablet    Take 3,000 Int'l Units by mouth daily    Vitamin D deficiency       zonisamide 25 MG capsule    Zonegran    90 capsule    Take 1 tablet (25 mg) once daily for 1 week, then 2 tablets once daily for 1 week, then 3 tablets once daily for 1 week, then 4 tablets once daily for 1 week.    Headache disorder

## 2018-01-05 ENCOUNTER — HOSPITAL ENCOUNTER (OUTPATIENT)
Dept: PHYSICAL THERAPY | Facility: CLINIC | Age: 43
Setting detail: THERAPIES SERIES
End: 2018-01-05
Attending: PHYSICIAN ASSISTANT
Payer: COMMERCIAL

## 2018-01-05 PROCEDURE — 97140 MANUAL THERAPY 1/> REGIONS: CPT | Mod: GP | Performed by: REHABILITATION PRACTITIONER

## 2018-01-05 PROCEDURE — 40000099 ZZH STATISTIC LYMPHEDEMA VISIT: Performed by: REHABILITATION PRACTITIONER

## 2018-01-09 ENCOUNTER — HOSPITAL ENCOUNTER (OUTPATIENT)
Dept: PHYSICAL THERAPY | Facility: CLINIC | Age: 43
Setting detail: THERAPIES SERIES
End: 2018-01-09
Attending: PHYSICIAN ASSISTANT
Payer: COMMERCIAL

## 2018-01-09 ENCOUNTER — OFFICE VISIT (OUTPATIENT)
Dept: DERMATOLOGY | Facility: CLINIC | Age: 43
End: 2018-01-09
Payer: COMMERCIAL

## 2018-01-09 VITALS — DIASTOLIC BLOOD PRESSURE: 74 MMHG | HEART RATE: 79 BPM | TEMPERATURE: 98.7 F | SYSTOLIC BLOOD PRESSURE: 107 MMHG

## 2018-01-09 DIAGNOSIS — L81.4 LENTIGO: ICD-10-CM

## 2018-01-09 DIAGNOSIS — L82.1 SK (SEBORRHEIC KERATOSIS): ICD-10-CM

## 2018-01-09 DIAGNOSIS — D23.9 DERMAL NEVUS: ICD-10-CM

## 2018-01-09 DIAGNOSIS — C43.9 METASTATIC MELANOMA (H): Primary | ICD-10-CM

## 2018-01-09 PROCEDURE — 97110 THERAPEUTIC EXERCISES: CPT | Mod: GP | Performed by: REHABILITATION PRACTITIONER

## 2018-01-09 PROCEDURE — 40000099 ZZH STATISTIC LYMPHEDEMA VISIT: Performed by: REHABILITATION PRACTITIONER

## 2018-01-09 PROCEDURE — 99213 OFFICE O/P EST LOW 20 MIN: CPT | Performed by: DERMATOLOGY

## 2018-01-09 PROCEDURE — 97140 MANUAL THERAPY 1/> REGIONS: CPT | Mod: GP | Performed by: REHABILITATION PRACTITIONER

## 2018-01-09 NOTE — NURSING NOTE
"Initial /74 (BP Location: Left arm, Patient Position: Sitting, Cuff Size: Adult Large)  Pulse 79  Temp 98.7  F (37.1  C) (Tympanic) Estimated body mass index is 37.32 kg/(m^2) as calculated from the following:    Height as of 12/7/17: 1.6 m (5' 2.99\").    Weight as of 12/7/17: 95.5 kg (210 lb 9.6 oz). .      "

## 2018-01-09 NOTE — PROGRESS NOTES
Doretha Fernandez is a 42 year old year old female patient here today for f/u h xof melanoma unknown primary.  She went to Dr. Cool for node dissection. MRI of brain and PET scan both clear.  Currently on  Nivolumab.  Doing well, she had some infection of left axilla, but all is well now.  She denies any new or changing skin lesions.  Patient reports the following modifying factors none.  Associated symptoms: none.  Patient has no other skin complaints today.  Remainder of the HPI, Meds, PMH, Allergies, FH, and SH was reviewed in chart.    Pertinent Hx:   Melanoma metastatic   Past Medical History:   Diagnosis Date     Abnormal MRI     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      Anxiety      Depression      Lumbago     left lower back pain     Malignant melanoma (H)      Mild persistent asthma      Prothrombin deficiency (H)     takes 81mg asa daily     Stroke (cerebrum) (H)     During      TIA (transient ischemic attack)        Past Surgical History:   Procedure Laterality Date     COLONOSCOPY N/A 10/18/2017    Procedure: COLONOSCOPY;  Colon;  Surgeon: Debbie Stephens MD;  Location: [facility]     DISSECT LYMPH NODE AXILLA Left 10/23/2017    Procedure: DISSECT LYMPH NODE AXILLA;  Left Axillary Lymph Node Dissection ;  Surgeon: Laurent Cool MD;  Location: [facility] OR     GYN SURGERY           REPAIR MOHS Left 2017    Procedure: REPAIR MOHS;  Left Upper Lid Moh's Reconstruction;  Surgeon: Kisha Bosch MD;  Location: [facility] OR        Family History   Problem Relation Age of Onset     CANCER Mother 45     lung     Neurologic Disorder Mother      Lipids Father      GASTROINTESTINAL DISEASE Father      Depression Father      CANCER Maternal Grandmother      Blood Disease Maternal Grandmother      Arthritis Maternal Grandmother      DIABETES Maternal Grandmother      Depression Maternal Grandmother      Macular Degeneration Maternal Grandmother      Glaucoma Maternal Grandmother       DIABETES Maternal Grandfather      CEREBROVASCULAR DISEASE Maternal Grandfather      Blood Disease Maternal Grandfather      HEART DISEASE Maternal Grandfather      Glaucoma Maternal Grandfather      CANCER Paternal Grandmother      Cancer - colorectal Paternal Grandmother      Respiratory Paternal Grandfather      Blood Disease Paternal Grandfather      HEART DISEASE Daughter      Asthma Daughter      Depression Sister        Social History     Social History     Marital status:      Spouse name: N/A     Number of children: N/A     Years of education: N/A     Occupational History     Not on file.     Social History Main Topics     Smoking status: Passive Smoke Exposure - Never Smoker     Last attempt to quit: 3/20/1998     Smokeless tobacco: Never Used     Alcohol use No      Comment: occ     Drug use: No     Sexual activity: Yes     Partners: Male     Birth control/ protection: Surgical     Other Topics Concern     Parent/Sibling W/ Cabg, Mi Or Angioplasty Before 65f 55m? No     Social History Narrative    20 y.o- patient's mother   of lung cancer. She had to take care of her younger sister.    2012- patient's  had a heart attack with stents placed, followed by cardiac rehabilitation    2000 TO 2012  was in [facility] for depression    2013 patient's  went through alcohol rehabilitation at [facility] inpatient            They have attended couple counseling a couple of times and patient went to the family program for chemical dependency.    Patient denies alcohol or drug use and herself            Has 2 children, girls ages 5 and 8    [employer] [place] [employer] and also works for the [employer]. 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               Outpatient Encounter Prescriptions as of 2018   Medication Sig Dispense Refill     venlafaxine (EFFEXOR-ER) 150 MG TB24 24 hr tablet Take 1 tablet (150 mg) by mouth daily  "(with breakfast) 30 each 0     order for DME Equipment being ordered: 20-30mmHg compression sleeve and 20-30mmHg compression glove\" x2 pair 1 Units 0     rOPINIRole (REQUIP) 0.25 MG tablet Take 1 tablet (0.25 mg) by mouth At Bedtime 30 tablet 11     cephALEXin (KEFLEX) 500 MG capsule Take 2 capsules (1,000 mg) by mouth 2 times daily 24 capsule 0     oxyCODONE IR (ROXICODONE) 5 MG tablet Take 1-3 tablets (5-15 mg) by mouth every 4 hours as needed for pain maximum 12 tablet(s) per day 40 tablet 0     aspirin 81 MG EC tablet Take 81 mg by mouth daily       acetaminophen (TYLENOL) 325 MG tablet Take 2 tablets (650 mg) by mouth every 4 hours as needed for other (mild pain) 100 tablet 0     senna-docusate (SENOKOT-S;PERICOLACE) 8.6-50 MG per tablet Take 1 tablet by mouth 2 times daily as needed for constipation 60 tablet 1     LORazepam (ATIVAN) 1 MG tablet Take 1 tablet (1 mg) by mouth every 8 hours as needed for anxiety 30 tablet 0     Cholecalciferol (VITAMIN D3) 3000 UNITS TABS Take 3,000 Int'l Units by mouth daily 30 tablet 3     zonisamide (ZONEGRAN) 25 MG capsule Take 1 tablet (25 mg) once daily for 1 week, then 2 tablets once daily for 1 week, then 3 tablets once daily for 1 week, then 4 tablets once daily for 1 week. 90 capsule 1     Facility-Administered Encounter Medications as of 1/9/2018   Medication Dose Route Frequency Provider Last Rate Last Dose     lidocaine 1 % 9 mL  9 mL Intradermal Once Anna Naidu MD         sodium bicarbonate 8.4 % injection 1 mEq  1 mEq Intradermal Once Anna Naidu MD                 Review Of Systems  Skin: As above  Eyes: negative  Ears/Nose/Throat: negative  Respiratory: No shortness of breath, dyspnea on exertion, cough, or hemoptysis  Cardiovascular: negative  Gastrointestinal: negative  Genitourinary: negative  Musculoskeletal: negative  Neurologic: negative  Psychiatric: negative  Hematologic/Lymphatic/Immunologic: negative  Endocrine: negative      O:   NAD, WDWN, " Alert & Oriented, Mood & Affect wnl, Vitals stable   Here today alone   /74 (BP Location: Left arm, Patient Position: Sitting, Cuff Size: Adult Large)  Pulse 79  Temp 98.7  F (37.1  C) (Tympanic)   General appearance normal   Vitals stable   Alert, oriented and in no acute distress      Following lymph nodes palpated: Occipital, Cervical, Supraclavicular , axilla, inguinal, femoral no lad   Stuck on papules and brown macules on trunk and ext    Padroni papules on trunk           The remainder of the full exam was unremarkable; the following areas were examined:  conjunctiva/lids, oral mucosa, neck, peripheral vascular system, abdomen, lymph nodes, digits/nails, eccrine and apocrine glands, scalp/hair, face, neck, chest, abdomen, buttocks, back, RUE, LUE, RLE, LLE       Eyes: Conjunctivae/lids:Normal     ENT: Lips, buccal mucosa, tongue: normal    MSK:Normal    Cardiovascular: peripheral edema none    Pulm: Breathing Normal    Lymph Nodes: No Head and Neck Lymphadenopathy     Neuro/Psych: Orientation:Normal; Mood/Affect:Normal      A/P:  1. Metastatic melanoma, seborrheic keratosis, lentigo, dermal nevi  MELANOMA DISCUSSED WITH PATIENT:  I discussed the specifics of tumor, prognosis, metachronous melanoma, self exam, and genetics with the patient. I explained the need for monthly skin exams including and taught the patient how to do this. Patient was asked about new or changing moles and a full skin exam was performed.   BENIGN LESIONS DISCUSSED WITH PATIENT:  I discussed the specifics of tumor, prognosis, and genetics of benign lesions.  I explained that treatment of these lesions would be purely cosmetic and not medically neccessary.  I discussed with patient different removal options including excision, cautery and /or laser.      Nature and genetics of benign skin lesions dicussed with patient.  Signs and Symptoms of skin cancer discussed with patient.  ABCDEs of melanoma reviewed with patient.  Patient  encouraged to perform monthly skin exams.  UV precautions reviewed with patient.  Skin care regimen reviewed with patient: Eliminate harsh soaps, i.e. Dial, zest, irsih spring; Mild soaps such as Cetaphil or Dove sensitive skin, avoid hot or cold showers, aggressive use of emollients including vanicream, cetaphil or cerave discussed with patient.    Risks of non-melanoma skin cancer discussed with patient   Return to clinic 3 months

## 2018-01-09 NOTE — MR AVS SNAPSHOT
After Visit Summary   1/9/2018    Doretha Fernandez    MRN: [id number]           Patient Information     Date Of Birth          1975        Visit Information        Provider Department      1/9/2018 10:00 AM Lowell Bullock MD [facility]        Today's Diagnoses     Metastatic melanoma (H)    -  1    Lentigo        SK (seborrheic keratosis)        Dermal nevus           Follow-ups after your visit        Your next 10 appointments already scheduled     Jan 11, 2018  3:30 PM CST   Lymphedema Treatment with Anastasiya Rodriguez PTA   [facility] ([facility])    [address] [address]   [phone]            Jan 18, 2018 10:30 AM CST   Return Visit with Kathy Richards MD   [facility] ([facility])    [facility]  [address]  [facility] [address]   [phone]            Jan 18, 2018 11:00 AM CST   Level 2 with ROOM 10 [facility] ([facility])    [facility]  [address]  [facility] [address]   [phone]            Feb 01, 2018  1:30 PM CST   Level 2 with ROOM 5 [facility] ([facility])    [facility]  [address]  [facility] [address]   [phone]            Apr 09, 2018  2:30 PM CDT   Return Visit with Lowell Bullock MD   [facility] ([facility])    [address] [address]   [phone]              Who to contact     If you have questions or need follow up information about today's clinic visit or your schedule please contact [facility] directly at [phone].  Normal or non-critical lab and imaging results will be communicated to you by MyChart, letter or phone within 4 business days after the clinic has received the results. If you do not hear from us  within 7 days, please contact the clinic through [employer] or phone. If you have a critical or abnormal lab result, we will notify you by phone as soon as possible.  Submit refill requests through [employer] or call your pharmacy and they will forward the refill request to us. Please allow 3 business days for your refill to be completed.          Additional Information About Your Visit        [employer] Information     [employer] gives you secure access to your electronic health record. If you see a primary care provider, you can also send messages to your care team and make appointments. If you have questions, please call your primary care clinic.  If you do not have a primary care provider, please call [phone] and they will assist you.        Care EveryWhere ID     This is your Care EveryWhere ID. This could be used by other organizations to access your [place] medical records  [id number]        Your Vitals Were     Pulse Temperature                79 98.7  F (37.1  C) (Tympanic)           Blood Pressure from Last 3 Encounters:   01/09/18 107/74   12/21/17 114/62   12/07/17 125/71    Weight from Last 3 Encounters:   12/07/17 95.5 kg (210 lb 9.6 oz)   11/24/17 94.3 kg (207 lb 12.8 oz)   11/22/17 93.9 kg (207 lb)              Today, you had the following     No orders found for display       Primary Care Provider Office Phone # Fax #    Anna Naidu [phone] [address] [facility] [address]        Equal Access to Services     [facility]: Hadii aad ku hadasho Soomaali, waaxda luqadaha, qaybta kaalmada adeegyada, waxay bernice moore . So [facility] [phone].    ATENCIÓN: Si habla español, tiene a mcdonnell disposición servicios gratuitos de asistencia lingüística. Llame al [phone].    We comply with applicable federal civil rights laws and [place] laws. We do not discriminate on the basis of race, color, national origin, age, disability, sex, sexual orientation,  "or gender identity.            Thank you!     Thank you for choosing [facility]  for your care. Our goal is always to provide you with excellent care. Hearing back from our patients is one way we can continue to improve our services. Please take a few minutes to complete the written survey that you may receive in the mail after your visit with us. Thank you!             Your Updated Medication List - Protect others around you: Learn how to safely use, store and throw away your medicines at www.disposemymeds.org.          This list is accurate as of: 1/9/18 10:50 AM.  Always use your most recent med list.                   Brand Name Dispense Instructions for use Diagnosis    acetaminophen 325 MG tablet    TYLENOL    100 tablet    Take 2 tablets (650 mg) by mouth every 4 hours as needed for other (mild pain)    Malignant melanoma of left upper extremity including shoulder (H)       aspirin 81 MG EC tablet      Take 81 mg by mouth daily        cephALEXin 500 MG capsule    KEFLEX    24 capsule    Take 2 capsules (1,000 mg) by mouth 2 times daily    Cellulitis of chest wall       LORazepam 1 MG tablet    ATIVAN    30 tablet    Take 1 tablet (1 mg) by mouth every 8 hours as needed for anxiety    Anxiety, Acute intractable tension-type headache       order for DME     1 Units    Equipment being ordered: 20-30mmHg compression sleeve and 20-30mmHg compression glove\" x2 pair    Lymphedema of left arm       oxyCODONE IR 5 MG tablet    ROXICODONE    40 tablet    Take 1-3 tablets (5-15 mg) by mouth every 4 hours as needed for pain maximum 12 tablet(s) per day    Cellulitis of chest wall       rOPINIRole 0.25 MG tablet    REQUIP    30 tablet    Take 1 tablet (0.25 mg) by mouth At Bedtime    Restless leg syndrome       senna-docusate 8.6-50 MG per tablet    SENOKOT-S;PERICOLACE    60 tablet    Take 1 tablet by mouth 2 times daily as needed for constipation    Malignant melanoma of left upper extremity including " shoulder (H)       venlafaxine 150 MG Tb24 24 hr tablet    EFFEXOR-ER    30 each    Take 1 tablet (150 mg) by mouth daily (with breakfast)    Mild major depression (H)       Vitamin D3 3000 UNITS Tabs     30 tablet    Take 3,000 Int'l Units by mouth daily    Vitamin D deficiency       zonisamide 25 MG capsule    Zonegran    90 capsule    Take 1 tablet (25 mg) once daily for 1 week, then 2 tablets once daily for 1 week, then 3 tablets once daily for 1 week, then 4 tablets once daily for 1 week.    Headache disorder

## 2018-01-09 NOTE — LETTER
2018         RE: Doretha Fernandez  [address]  [place] [address]        Dear Colleague,    Thank you for referring your patient, Doretha Fernandez, to the [facility]. Please see a copy of my visit note below.    Doretha Feranndez is a 42 year old year old female patient here today for f/u h xof melanoma unknown primary.  She went to Dr. Cool for node dissection. MRI of brain and PET scan both clear.  Currently on  Nivolumab.  Doing well, she had some infection of left axilla, but all is well now.  She denies any new or changing skin lesions.  Patient reports the following modifying factors none.  Associated symptoms: none.  Patient has no other skin complaints today.  Remainder of the HPI, Meds, PMH, Allergies, FH, and SH was reviewed in chart.    Pertinent Hx:   Melanoma metastatic   Past Medical History:   Diagnosis Date     Abnormal MRI     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      Anxiety      Depression      Lumbago     left lower back pain     Malignant melanoma (H)      Mild persistent asthma      Prothrombin deficiency (H)     takes 81mg asa daily     Stroke (cerebrum) (H)     During      TIA (transient ischemic attack)        Past Surgical History:   Procedure Laterality Date     COLONOSCOPY N/A 10/18/2017    Procedure: COLONOSCOPY;  Colon;  Surgeon: Debbie Stephens MD;  Location: [facility] OR     DISSECT LYMPH NODE AXILLA Left 10/23/2017    Procedure: DISSECT LYMPH NODE AXILLA;  Left Axillary Lymph Node Dissection ;  Surgeon: Laurent Cool MD;  Location: [facility] OR     GYN SURGERY           REPAIR MOHS Left 2017    Procedure: REPAIR MOHS;  Left Upper Lid Moh's Reconstruction;  Surgeon: Kisha Bosch MD;  Location:  OR        Family History   Problem Relation Age of Onset     CANCER Mother 45     lung     Neurologic Disorder Mother      Lipids Father      GASTROINTESTINAL DISEASE Father      Depression Father      CANCER Maternal  Grandmother      Blood Disease Maternal Grandmother      Arthritis Maternal Grandmother      DIABETES Maternal Grandmother      Depression Maternal Grandmother      Macular Degeneration Maternal Grandmother      Glaucoma Maternal Grandmother      DIABETES Maternal Grandfather      CEREBROVASCULAR DISEASE Maternal Grandfather      Blood Disease Maternal Grandfather      HEART DISEASE Maternal Grandfather      Glaucoma Maternal Grandfather      CANCER Paternal Grandmother      Cancer - colorectal Paternal Grandmother      Respiratory Paternal Grandfather      Blood Disease Paternal Grandfather      HEART DISEASE Daughter      Asthma Daughter      Depression Sister        Social History     Social History     Marital status:      Spouse name: N/A     Number of children: N/A     Years of education: N/A     Occupational History     Not on file.     Social History Main Topics     Smoking status: Passive Smoke Exposure - Never Smoker     Last attempt to quit: 3/20/1998     Smokeless tobacco: Never Used     Alcohol use No      Comment: occ     Drug use: No     Sexual activity: Yes     Partners: Male     Birth control/ protection: Surgical     Other Topics Concern     Parent/Sibling W/ Cabg, Mi Or Angioplasty Before 65f 55m? No     Social History Narrative    20 y.o- patient's mother   of lung cancer. She had to take care of her younger sister.    2012- patient's  had a heart attack with stents placed, followed by cardiac rehabilitation    2000 TO 2012  was in [facility] for depression    2013 patient's  went through alcohol rehabilitation at [facility] inpatient            They have attended couple counseling a couple of times and patient went to the family program for chemical dependency.    Patient denies alcohol or drug use and herself            Has 2 children, girls ages 5 and 8    [employer] and also works for the [employer]. For a  "while she was working 3 jobs since her  was ill.               Outpatient Encounter Prescriptions as of 1/9/2018   Medication Sig Dispense Refill     venlafaxine (EFFEXOR-ER) 150 MG TB24 24 hr tablet Take 1 tablet (150 mg) by mouth daily (with breakfast) 30 each 0     order for DME Equipment being ordered: 20-30mmHg compression sleeve and 20-30mmHg compression glove\" x2 pair 1 Units 0     rOPINIRole (REQUIP) 0.25 MG tablet Take 1 tablet (0.25 mg) by mouth At Bedtime 30 tablet 11     cephALEXin (KEFLEX) 500 MG capsule Take 2 capsules (1,000 mg) by mouth 2 times daily 24 capsule 0     oxyCODONE IR (ROXICODONE) 5 MG tablet Take 1-3 tablets (5-15 mg) by mouth every 4 hours as needed for pain maximum 12 tablet(s) per day 40 tablet 0     aspirin 81 MG EC tablet Take 81 mg by mouth daily       acetaminophen (TYLENOL) 325 MG tablet Take 2 tablets (650 mg) by mouth every 4 hours as needed for other (mild pain) 100 tablet 0     senna-docusate (SENOKOT-S;PERICOLACE) 8.6-50 MG per tablet Take 1 tablet by mouth 2 times daily as needed for constipation 60 tablet 1     LORazepam (ATIVAN) 1 MG tablet Take 1 tablet (1 mg) by mouth every 8 hours as needed for anxiety 30 tablet 0     Cholecalciferol (VITAMIN D3) 3000 UNITS TABS Take 3,000 Int'l Units by mouth daily 30 tablet 3     zonisamide (ZONEGRAN) 25 MG capsule Take 1 tablet (25 mg) once daily for 1 week, then 2 tablets once daily for 1 week, then 3 tablets once daily for 1 week, then 4 tablets once daily for 1 week. 90 capsule 1     Facility-Administered Encounter Medications as of 1/9/2018   Medication Dose Route Frequency Provider Last Rate Last Dose     lidocaine 1 % 9 mL  9 mL Intradermal Once Anna Naidu MD         sodium bicarbonate 8.4 % injection 1 mEq  1 mEq Intradermal Once Anna Naidu MD                 Review Of Systems  Skin: As above  Eyes: negative  Ears/Nose/Throat: negative  Respiratory: No shortness of breath, dyspnea on exertion, cough, or " hemoptysis  Cardiovascular: negative  Gastrointestinal: negative  Genitourinary: negative  Musculoskeletal: negative  Neurologic: negative  Psychiatric: negative  Hematologic/Lymphatic/Immunologic: negative  Endocrine: negative      O:   NAD, WDWN, Alert & Oriented, Mood & Affect wnl, Vitals stable   Here today alone   /74 (BP Location: Left arm, Patient Position: Sitting, Cuff Size: Adult Large)  Pulse 79  Temp 98.7  F (37.1  C) (Tympanic)   General appearance normal   Vitals stable   Alert, oriented and in no acute distress      Following lymph nodes palpated: Occipital, Cervical, Supraclavicular , axilla, inguinal, femoral no lad   Stuck on papules and brown macules on trunk and ext    [place] papules on trunk           The remainder of the full exam was unremarkable; the following areas were examined:  conjunctiva/lids, oral mucosa, neck, peripheral vascular system, abdomen, lymph nodes, digits/nails, eccrine and apocrine glands, scalp/hair, face, neck, chest, abdomen, buttocks, back, RUE, LUE, RLE, LLE       Eyes: Conjunctivae/lids:Normal     ENT: Lips, buccal mucosa, tongue: normal    MSK:Normal    Cardiovascular: peripheral edema none    Pulm: Breathing Normal    Lymph Nodes: No Head and Neck Lymphadenopathy     Neuro/Psych: Orientation:Normal; Mood/Affect:Normal      A/P:  1. Metastatic melanoma, seborrheic keratosis, lentigo, dermal nevi  MELANOMA DISCUSSED WITH PATIENT:  I discussed the specifics of tumor, prognosis, metachronous melanoma, self exam, and genetics with the patient. I explained the need for monthly skin exams including and taught the patient how to do this. Patient was asked about new or changing moles and a full skin exam was performed.   BENIGN LESIONS DISCUSSED WITH PATIENT:  I discussed the specifics of tumor, prognosis, and genetics of benign lesions.  I explained that treatment of these lesions would be purely cosmetic and not medically neccessary.  I discussed with patient  different removal options including excision, cautery and /or laser.      Nature and genetics of benign skin lesions dicussed with patient.  Signs and Symptoms of skin cancer discussed with patient.  ABCDEs of melanoma reviewed with patient.  Patient encouraged to perform monthly skin exams.  UV precautions reviewed with patient.  Skin care regimen reviewed with patient: Eliminate harsh soaps, i.e. Dial, zest, irsih spring; Mild soaps such as Cetaphil or Dove sensitive skin, avoid hot or cold showers, aggressive use of emollients including vanicream, cetaphil or cerave discussed with patient.    Risks of non-melanoma skin cancer discussed with patient   Return to clinic 3 months      Again, thank you for allowing me to participate in the care of your patient.        Sincerely,        Lowell Bullock MD

## 2018-01-11 ENCOUNTER — HOSPITAL ENCOUNTER (OUTPATIENT)
Dept: PHYSICAL THERAPY | Facility: CLINIC | Age: 43
Setting detail: THERAPIES SERIES
End: 2018-01-11
Attending: PHYSICIAN ASSISTANT
Payer: COMMERCIAL

## 2018-01-11 DIAGNOSIS — I89.0 SECONDARY LYMPHEDEMA: Primary | ICD-10-CM

## 2018-01-11 PROCEDURE — 97140 MANUAL THERAPY 1/> REGIONS: CPT | Mod: GP | Performed by: REHABILITATION PRACTITIONER

## 2018-01-11 PROCEDURE — 40000099 ZZH STATISTIC LYMPHEDEMA VISIT: Performed by: REHABILITATION PRACTITIONER

## 2018-01-11 NOTE — PROGRESS NOTES
Return to work letter completed with patient's requested start date of 01.16.18 and physical therapy's recommended restrictions.  Completed by Dr. Ghosh on behalf of Dr. Richards as he is out of the office.  Patient will  letter and return to work form today, she does not want it faxed to the number provided.

## 2018-01-11 NOTE — LETTER
January 11, 2018    RE:  Doretha Fernandez                                                                                                                                                       [address]  [place] [address]            To whom it may concern:    Doretha Fernandez is under our care at [facility] for treatment of Metastatic Malignant Melanoma. She  may return to work with the following restrictions: See Attached List.  She may return to work with these restrictions starting 01.16.18.    Profesionally,        Dr. Escobar  [facility]  (P) [phone]  (F) [phone]

## 2018-01-17 ENCOUNTER — HOSPITAL ENCOUNTER (OUTPATIENT)
Dept: PHYSICAL THERAPY | Facility: CLINIC | Age: 43
Setting detail: THERAPIES SERIES
End: 2018-01-17
Attending: PHYSICIAN ASSISTANT
Payer: COMMERCIAL

## 2018-01-17 PROCEDURE — 40000099 ZZH STATISTIC LYMPHEDEMA VISIT: Performed by: PHYSICAL THERAPIST

## 2018-01-17 PROCEDURE — 97140 MANUAL THERAPY 1/> REGIONS: CPT | Mod: GP | Performed by: PHYSICAL THERAPIST

## 2018-01-18 ENCOUNTER — HOSPITAL ENCOUNTER (OUTPATIENT)
Dept: LAB | Facility: CLINIC | Age: 43
Discharge: HOME | End: 2018-01-18
Attending: INTERNAL MEDICINE | Admitting: INTERNAL MEDICINE
Payer: COMMERCIAL

## 2018-01-18 ENCOUNTER — INFUSION THERAPY VISIT (OUTPATIENT)
Dept: INFUSION THERAPY | Facility: CLINIC | Age: 43
End: 2018-01-18
Attending: INTERNAL MEDICINE
Payer: COMMERCIAL

## 2018-01-18 ENCOUNTER — ONCOLOGY VISIT (OUTPATIENT)
Dept: ONCOLOGY | Facility: CLINIC | Age: 43
End: 2018-01-18
Attending: INTERNAL MEDICINE
Payer: COMMERCIAL

## 2018-01-18 VITALS — SYSTOLIC BLOOD PRESSURE: 111 MMHG | DIASTOLIC BLOOD PRESSURE: 66 MMHG | HEART RATE: 93 BPM

## 2018-01-18 VITALS
BODY MASS INDEX: 38.16 KG/M2 | OXYGEN SATURATION: 100 % | HEART RATE: 95 BPM | DIASTOLIC BLOOD PRESSURE: 83 MMHG | HEIGHT: 63 IN | WEIGHT: 215.4 LBS | SYSTOLIC BLOOD PRESSURE: 123 MMHG | RESPIRATION RATE: 18 BRPM | TEMPERATURE: 99.1 F

## 2018-01-18 DIAGNOSIS — J45.20 MILD INTERMITTENT ASTHMA WITHOUT COMPLICATION: ICD-10-CM

## 2018-01-18 DIAGNOSIS — F41.1 ANXIETY STATE: ICD-10-CM

## 2018-01-18 DIAGNOSIS — F32.0 MILD MAJOR DEPRESSION (H): ICD-10-CM

## 2018-01-18 DIAGNOSIS — C43.62 MALIGNANT MELANOMA OF LEFT UPPER EXTREMITY INCLUDING SHOULDER (H): Primary | ICD-10-CM

## 2018-01-18 DIAGNOSIS — C43.9 METASTATIC MALIGNANT MELANOMA (H): Primary | ICD-10-CM

## 2018-01-18 LAB
ALBUMIN SERPL-MCNC: 3.6 G/DL (ref 3.4–5)
ALP SERPL-CCNC: 68 U/L (ref 40–150)
ALT SERPL W P-5'-P-CCNC: 34 U/L (ref 0–50)
ANION GAP SERPL CALCULATED.3IONS-SCNC: 9 MMOL/L (ref 3–14)
AST SERPL W P-5'-P-CCNC: 15 U/L (ref 0–45)
BILIRUB SERPL-MCNC: 0.2 MG/DL (ref 0.2–1.3)
BUN SERPL-MCNC: 14 MG/DL (ref 7–30)
CALCIUM SERPL-MCNC: 8.8 MG/DL (ref 8.5–10.1)
CHLORIDE SERPL-SCNC: 107 MMOL/L (ref 94–109)
CO2 SERPL-SCNC: 23 MMOL/L (ref 20–32)
CREAT SERPL-MCNC: 0.58 MG/DL (ref 0.52–1.04)
GFR SERPL CREATININE-BSD FRML MDRD: >90 ML/MIN/1.7M2
GLUCOSE SERPL-MCNC: 134 MG/DL (ref 70–99)
POTASSIUM SERPL-SCNC: 3.9 MMOL/L (ref 3.4–5.3)
PROT SERPL-MCNC: 7.7 G/DL (ref 6.8–8.8)
SODIUM SERPL-SCNC: 139 MMOL/L (ref 133–144)
TSH SERPL DL<=0.005 MIU/L-ACNC: 1.24 MU/L (ref 0.4–4)

## 2018-01-18 PROCEDURE — 84443 ASSAY THYROID STIM HORMONE: CPT | Performed by: INTERNAL MEDICINE

## 2018-01-18 PROCEDURE — 25000128 H RX IP 250 OP 636: Performed by: INTERNAL MEDICINE

## 2018-01-18 PROCEDURE — 36415 COLL VENOUS BLD VENIPUNCTURE: CPT | Performed by: INTERNAL MEDICINE

## 2018-01-18 PROCEDURE — 99214 OFFICE O/P EST MOD 30 MIN: CPT | Performed by: INTERNAL MEDICINE

## 2018-01-18 PROCEDURE — 80053 COMPREHEN METABOLIC PANEL: CPT | Performed by: INTERNAL MEDICINE

## 2018-01-18 PROCEDURE — G0463 HOSPITAL OUTPT CLINIC VISIT: HCPCS | Mod: 25

## 2018-01-18 PROCEDURE — 96413 CHEMO IV INFUSION 1 HR: CPT

## 2018-01-18 RX ORDER — MEPERIDINE HYDROCHLORIDE 25 MG/ML
25 INJECTION INTRAMUSCULAR; INTRAVENOUS; SUBCUTANEOUS EVERY 30 MIN PRN
Status: CANCELLED | OUTPATIENT
Start: 2018-02-01

## 2018-01-18 RX ORDER — METHYLPREDNISOLONE SODIUM SUCCINATE 125 MG/2ML
125 INJECTION, POWDER, LYOPHILIZED, FOR SOLUTION INTRAMUSCULAR; INTRAVENOUS
Status: CANCELLED
Start: 2018-01-18

## 2018-01-18 RX ORDER — SODIUM CHLORIDE 9 MG/ML
1000 INJECTION, SOLUTION INTRAVENOUS CONTINUOUS PRN
Status: CANCELLED
Start: 2018-02-01

## 2018-01-18 RX ORDER — EPINEPHRINE 1 MG/ML
0.3 INJECTION, SOLUTION, CONCENTRATE INTRAVENOUS EVERY 5 MIN PRN
Status: CANCELLED | OUTPATIENT
Start: 2018-01-18

## 2018-01-18 RX ORDER — EPINEPHRINE 0.3 MG/.3ML
0.3 INJECTION SUBCUTANEOUS EVERY 5 MIN PRN
Status: CANCELLED | OUTPATIENT
Start: 2018-02-01

## 2018-01-18 RX ORDER — SODIUM CHLORIDE 9 MG/ML
1000 INJECTION, SOLUTION INTRAVENOUS CONTINUOUS PRN
Status: CANCELLED
Start: 2018-01-18

## 2018-01-18 RX ORDER — LORAZEPAM 2 MG/ML
0.5 INJECTION INTRAMUSCULAR EVERY 4 HOURS PRN
Status: CANCELLED
Start: 2018-02-01

## 2018-01-18 RX ORDER — EPINEPHRINE 0.3 MG/.3ML
0.3 INJECTION SUBCUTANEOUS EVERY 5 MIN PRN
Status: CANCELLED | OUTPATIENT
Start: 2018-01-18

## 2018-01-18 RX ORDER — LORAZEPAM 2 MG/ML
0.5 INJECTION INTRAMUSCULAR EVERY 4 HOURS PRN
Status: CANCELLED
Start: 2018-01-18

## 2018-01-18 RX ORDER — ALBUTEROL SULFATE 90 UG/1
1-2 AEROSOL, METERED RESPIRATORY (INHALATION)
Status: CANCELLED
Start: 2018-01-18

## 2018-01-18 RX ORDER — METHYLPREDNISOLONE SODIUM SUCCINATE 125 MG/2ML
125 INJECTION, POWDER, LYOPHILIZED, FOR SOLUTION INTRAMUSCULAR; INTRAVENOUS
Status: CANCELLED
Start: 2018-02-01

## 2018-01-18 RX ORDER — MEPERIDINE HYDROCHLORIDE 25 MG/ML
25 INJECTION INTRAMUSCULAR; INTRAVENOUS; SUBCUTANEOUS EVERY 30 MIN PRN
Status: CANCELLED | OUTPATIENT
Start: 2018-01-18

## 2018-01-18 RX ORDER — ALBUTEROL SULFATE 0.83 MG/ML
2.5 SOLUTION RESPIRATORY (INHALATION)
Status: CANCELLED | OUTPATIENT
Start: 2018-01-18

## 2018-01-18 RX ORDER — EPINEPHRINE 1 MG/ML
0.3 INJECTION, SOLUTION, CONCENTRATE INTRAVENOUS EVERY 5 MIN PRN
Status: CANCELLED | OUTPATIENT
Start: 2018-02-01

## 2018-01-18 RX ORDER — DIPHENHYDRAMINE HYDROCHLORIDE 50 MG/ML
50 INJECTION INTRAMUSCULAR; INTRAVENOUS
Status: CANCELLED
Start: 2018-01-18

## 2018-01-18 RX ORDER — ALBUTEROL SULFATE 90 UG/1
1-2 AEROSOL, METERED RESPIRATORY (INHALATION)
Status: CANCELLED
Start: 2018-02-01

## 2018-01-18 RX ORDER — DIPHENHYDRAMINE HYDROCHLORIDE 50 MG/ML
50 INJECTION INTRAMUSCULAR; INTRAVENOUS
Status: CANCELLED
Start: 2018-02-01

## 2018-01-18 RX ORDER — ALBUTEROL SULFATE 0.83 MG/ML
2.5 SOLUTION RESPIRATORY (INHALATION)
Status: CANCELLED | OUTPATIENT
Start: 2018-02-01

## 2018-01-18 RX ADMIN — SODIUM CHLORIDE 280 MG: 9 INJECTION, SOLUTION INTRAVENOUS at 12:07

## 2018-01-18 ASSESSMENT — PAIN SCALES - GENERAL: PAINLEVEL: MILD PAIN (2)

## 2018-01-18 NOTE — MR AVS SNAPSHOT
After Visit Summary   1/18/2018    Doretha Fernandez    MRN: [id number]           Patient Information     Date Of Birth          1975        Visit Information        Provider Department      1/18/2018 11:00 AM ROOM 10 [facility]        Today's Diagnoses     Malignant melanoma of left upper extremity including shoulder (H)    -  1       Follow-ups after your visit        Your next 10 appointments already scheduled     Jan 19, 2018  8:30 AM CST   Lymphedema Treatment with Anastasiya Rodriguez PTA   [facility] Lymphedema ([facility])    [address] [address]   [phone]            Jan 23, 2018  3:00 PM CST   Lymphedema Treatment with Anastasiya Rodriguez PTA   [facility]ymp[facility]ema ([facility])    [address] [address]   [phone]            Jan 25, 2018  3:00 PM CST   Lymphedema Treatment with Anastasiya Rodriguez PTA   [facility]ymp[facility]ema ([facility])    [address] [address]   [phone]            Jan 30, 2018  3:00 PM CST   Lymphedema Treatment with Anastasiya Rodriguez PTA   [facility]ymphedema ([facility])    [address] [address]   [phone]            Feb 01, 2018 11:00 AM CST   Lymphedema Treatment with Anastasiya Rodriguez PTA   [facility]ymp[facility]ema ([facility])    [address] [address]   [phone]            Feb 01, 2018  1:30 PM CST   Level 2 with ROOM 5 [facility]fusion ([facility])    [facility]  [address]  [facility] [address]   [phone]            Feb 06, 2018  3:00 PM CST   Lymphedema Treatment with Anna Graham, PT   [facility] Lymphedema ([facility])    [address] [address]   [phone]            Feb 08, 2018  3:00 PM CST    Lymphedema Treatment with Anastasiya Rodriguez PTA   [facility]hedema ([facility])    [address]  [facility] [address]   [phone]            Feb 15, 2018 11:00 AM CST   Return Visit with Kathy Richards MD   [facility] ([facility])    [facility]  [address]  [facility] [address]   [phone]            Feb 15, 2018 11:30 AM CST   Level 2 with ROOM 3 [facility]fusion ([facility])    [facility]  [address]  [facility] [address]   [phone]              Who to contact     If you have questions or need follow up information about today's clinic visit or your schedule please contact [facility] directly at [phone].  Normal or non-critical lab and imaging results will be communicated to you by MyChart, letter or phone within 4 business days after the clinic has received the results. If you do not hear from us within 7 days, please contact the clinic through marinanowhart or phone. If you have a critical or abnormal lab result, we will notify you by phone as soon as possible.  Submit refill requests through [employer] or call your pharmacy and they will forward the refill request to us. Please allow 3 business days for your refill to be completed.          Additional Information About Your Visit        marinanowharGamervision Information     [employer] gives you secure access to your electronic health record. If you see a primary care provider, you can also send messages to your care team and make appointments. If you have questions, please call your primary care clinic.  If you do not have a primary care provider, please call [phone] and they will assist you.        Care EveryWhere ID     This is your Care EveryWhere ID. This could be used by other organizations to access your [facility] medical records  [id number]        Your Vitals Were     Pulse                    93            Blood Pressure from Last 3 Encounters:   01/18/18 111/66   01/18/18 123/83   01/09/18 107/74    Weight from Last 3 Encounters:   01/18/18 97.7 kg (215 lb 6.4 oz)   12/07/17 95.5 kg (210 lb 9.6 oz)   11/24/17 94.3 kg (207 lb 12.8 oz)              We Performed the Following     Comprehensive metabolic panel     TSH with free T4 reflex        Primary Care Provider Office Phone # Fax #    Anna Naidu [phone] [address] [facility] [address]        Equal Access to Services     [facility]: Hadii josy garnett hadsondra Gustafson, waaxda erik, qaybta kaalmada chris, pancho moore . So [facility] [phone].    ATENCIÓN: Si habla español, tiene a mcdonnell disposición servicios gratuitos de asistencia lingüística. Llame al [phone].    We comply with applicable federal civil rights laws and [place] laws. We do not discriminate on the basis of race, color, national origin, age, disability, sex, sexual orientation, or gender identity.            Thank you!     Thank you for choosing [facility]  for your care. Our goal is always to provide you with excellent care. Hearing back from our patients is one way we can continue to improve our services. Please take a few minutes to complete the written survey that you may receive in the mail after your visit with us. Thank you!             Your Updated Medication List - Protect others around you: Learn how to safely use, store and throw away your medicines at www.disposemymeds.org.          This list is accurate as of: 1/18/18  4:06 PM.  Always use your most recent med list.                   Brand Name Dispense Instructions for use Diagnosis    acetaminophen 325 MG tablet    TYLENOL    100 tablet    Take 2 tablets (650 mg) by mouth every 4 hours as needed for other (mild pain)    Malignant melanoma of left upper extremity including shoulder (H)       aspirin 81 MG EC tablet      Take 81 mg  "by mouth daily        LORazepam 1 MG tablet    ATIVAN    30 tablet    Take 1 tablet (1 mg) by mouth every 8 hours as needed for anxiety    Anxiety, Acute intractable tension-type headache       * order for DME     1 Units    Equipment being ordered: 20-30mmHg compression sleeve and 20-30mmHg compression glove\" x2 pair    Lymphedema of left arm       * order for DME     1 each    Equipment being ordered: x2 Wearease compression shirt    Secondary lymphedema       oxyCODONE IR 5 MG tablet    ROXICODONE    40 tablet    Take 1-3 tablets (5-15 mg) by mouth every 4 hours as needed for pain maximum 12 tablet(s) per day    Cellulitis of chest wall       rOPINIRole 0.25 MG tablet    REQUIP    30 tablet    Take 1 tablet (0.25 mg) by mouth At Bedtime    Restless leg syndrome       senna-docusate 8.6-50 MG per tablet    SENOKOT-S;PERICOLACE    60 tablet    Take 1 tablet by mouth 2 times daily as needed for constipation    Malignant melanoma of left upper extremity including shoulder (H)       venlafaxine 150 MG Tb24 24 hr tablet    EFFEXOR-ER    30 each    Take 1 tablet (150 mg) by mouth daily (with breakfast)    Mild major depression (H)       Vitamin D3 3000 UNITS Tabs     30 tablet    Take 3,000 Int'l Units by mouth daily    Vitamin D deficiency       zonisamide 25 MG capsule    Zonegran    90 capsule    Take 1 tablet (25 mg) once daily for 1 week, then 2 tablets once daily for 1 week, then 3 tablets once daily for 1 week, then 4 tablets once daily for 1 week.    Headache disorder       * Notice:  This list has 2 medication(s) that are the same as other medications prescribed for you. Read the directions carefully, and ask your doctor or other care provider to review them with you.      "

## 2018-01-18 NOTE — PATIENT INSTRUCTIONS
We would like to see you back in clinic with Dr. Richards in 4 weeks with infusion to be scheduled per treatment plan.  When you are in need of a refill, please call your pharmacy and they will send us a request.  Copy of appointments, and after visit summary (AVS) given to patient.  If you have any questions during business hours (M-F 8 AM- 4PM), please call Jeanie Keenan RN, BSN, OCN Oncology Hematology /Breast Cancer Navigator at [facility] [phone].   For questions after business hours, or on holidays/weekends, please call our after hours Nurse Triage line [phone]. Thank you.

## 2018-01-18 NOTE — PROGRESS NOTES
"Hematology/ Oncology Follow-up Visit:  Jan 18, 2018    Reason for Visit:   Chief Complaint   Patient presents with     Chemotherapy     6 week follow up Malignant Melanoma. Review lab results.          Interval History:  Patient is here today for follow-up.  She has been receiving immunotherapy with development.  She has been tolerating treatment very well without any significant side effects.  She denies any nausea or vomiting or diarrhea.  She denies any fever or chills.  She denies any shortness of breath or cough or wheezing.  She is dealing with some lymphedema of the left arm which has been gradually improving.    Review Of Systems:  Constitutional: Negative for fever, chills, and night sweats.  Skin: negative.  Eyes: negative.  Ears/Nose/Throat: negative.  Respiratory: No shortness of breath, dyspnea on exertion, cough, or hemoptysis.  Cardiovascular: negative.  Gastrointestinal: negative.  Genitourinary: negative.  Musculoskeletal: negative.  Neurologic: negative.  Psychiatric: negative.  Hematologic/Lymphatic/Immunologic: negative.  Endocrine: negative.    All other ROS negative unless mentioned in interval history.    Past medical, social, surgical, and family histories reviewed.    Allergies:  Allergies as of 01/18/2018 - Nicola as Reviewed 01/18/2018   Allergen Reaction Noted     Compazine [prochlorperazine] Fatigue 10/12/2017     Fentanyl Other (See Comments) 10/12/2017     Erythrocin Nausea and Vomiting 03/20/2008     Zithromax [azithromycin dihydrate] Diarrhea 02/17/2012       Current Medications:  Current Outpatient Prescriptions   Medication Sig Dispense Refill     order for DME Equipment being ordered: x2 Wearease compression shirt 1 each 0     venlafaxine (EFFEXOR-ER) 150 MG TB24 24 hr tablet Take 1 tablet (150 mg) by mouth daily (with breakfast) 30 each 0     order for DME Equipment being ordered: 20-30mmHg compression sleeve and 20-30mmHg compression glove\" x2 pair 1 Units 0     aspirin 81 MG EC " "tablet Take 81 mg by mouth daily       acetaminophen (TYLENOL) 325 MG tablet Take 2 tablets (650 mg) by mouth every 4 hours as needed for other (mild pain) 100 tablet 0     Cholecalciferol (VITAMIN D3) 3000 UNITS TABS Take 3,000 Int'l Units by mouth daily 30 tablet 3     rOPINIRole (REQUIP) 0.25 MG tablet Take 1 tablet (0.25 mg) by mouth At Bedtime (Patient not taking: Reported on 1/18/2018) 30 tablet 11     oxyCODONE IR (ROXICODONE) 5 MG tablet Take 1-3 tablets (5-15 mg) by mouth every 4 hours as needed for pain maximum 12 tablet(s) per day (Patient not taking: Reported on 1/18/2018) 40 tablet 0     senna-docusate (SENOKOT-S;PERICOLACE) 8.6-50 MG per tablet Take 1 tablet by mouth 2 times daily as needed for constipation (Patient not taking: Reported on 1/18/2018) 60 tablet 1     LORazepam (ATIVAN) 1 MG tablet Take 1 tablet (1 mg) by mouth every 8 hours as needed for anxiety (Patient not taking: Reported on 1/18/2018) 30 tablet 0     zonisamide (ZONEGRAN) 25 MG capsule Take 1 tablet (25 mg) once daily for 1 week, then 2 tablets once daily for 1 week, then 3 tablets once daily for 1 week, then 4 tablets once daily for 1 week. (Patient not taking: Reported on 1/18/2018) 90 capsule 1        Physical Exam:  /83 (BP Location: Right arm, Patient Position: Sitting, Cuff Size: Adult Large)  Pulse 95  Temp 99.1  F (37.3  C) (Tympanic)  Resp 18  Ht 1.6 m (5' 3\")  Wt 97.7 kg (215 lb 6.4 oz)  SpO2 100%  Breastfeeding? No  BMI 38.16 kg/m2  Wt Readings from Last 12 Encounters:   01/18/18 97.7 kg (215 lb 6.4 oz)   12/07/17 95.5 kg (210 lb 9.6 oz)   11/24/17 94.3 kg (207 lb 12.8 oz)   11/22/17 93.9 kg (207 lb)   11/14/17 95.9 kg (211 lb 6.7 oz)   11/09/17 94.8 kg (208 lb 14.4 oz)   11/02/17 96.1 kg (211 lb 12.8 oz)   11/02/17 95.8 kg (211 lb 4.8 oz)   10/27/17 95.8 kg (211 lb 1.6 oz)   10/23/17 94.5 kg (208 lb 5.4 oz)   10/18/17 94.7 kg (208 lb 12.8 oz)   10/13/17 94.6 kg (208 lb 8 oz)     ECOG performance status: " 0  GENERAL APPEARANCE: Healthy, alert and in no acute distress.  HEENT: Sclerae anicteric. PERRLA. Oropharynx without ulcers, lesions, or thrush.  NECK: Supple. No asymmetry or masses.  LYMPHATICS: No palpable cervical, supraclavicular, axillary, or inguinal lymphadenopathy.  RESP: Lungs clear to auscultation bilaterally without rales, rhonchi or wheezes.  CARDIOVASCULAR: Regular rate and rhythm. Normal S1, S2; no S3 or S4. No murmur, gallop, or rub.  ABDOMEN: Soft, nontender. Bowel sounds normal. No palpable organomegaly or masses.  MUSCULOSKELETAL: Extremities without gross deformities noted. No edema of bilateral lower extremities.  SKIN: No suspicious lesions or rashes.  NEURO: Alert and oriented x 3. Cranial nerves II-XII grossly intact.  PSYCHIATRIC: Mentation and affect appear normal.    Laboratory/Imaging Studies:  No visits with results within 2 Week(s) from this visit.  Latest known visit with results is:    Infusion Therapy Visit on 12/21/2017   Component Date Value Ref Range Status     Sodium 12/21/2017 Canceled, Test credited  133 - 144 mmol/L Final    Comment: Unsatisfactory specimen - hemolyzed  NIKOLAI RN NOTIFIED INFUSION ZS 1203 12/21/17       Potassium 12/21/2017 Canceled, Test credited  3.4 - 5.3 mmol/L Final    Comment: Unsatisfactory specimen - hemolyzed  NIKOLAI RN NOTIFIED INFUSION ZS 1203 12/21/17       Chloride 12/21/2017 Canceled, Test credited  94 - 109 mmol/L Final    Comment: Unsatisfactory specimen - hemolyzed  NIKOLAI RN NOTIFIED INFUSION ZS 1203 12/21/17       Carbon Dioxide 12/21/2017 Canceled, Test credited  20 - 32 mmol/L Final    Comment: Unsatisfactory specimen - hemolyzed  NIKOLAI RN NOTIFIED INFUSION ZS 1203 12/21/17       Anion Gap 12/21/2017 Canceled, Test credited  6 - 17 mmol/L Final    Comment: Unsatisfactory specimen - hemolyzed  NIKOLAI RN NOTIFIED INFUSION ZS 1203 12/21/17       Glucose 12/21/2017 Canceled, Test credited  70 - 99 mg/dL Final    Comment: Unsatisfactory specimen -  hemolyzed  NIKOLAI RN NOTIFIED INFUSION ZS 1203 12/21/17       Urea Nitrogen 12/21/2017 Canceled, Test credited  7 - 30 mg/dL Final    Comment: Unsatisfactory specimen - hemolyzed  NIKOLAI RN NOTIFIED INFUSION ZS 1203 12/21/17       Creatinine 12/21/2017 Canceled, Test credited  0.52 - 1.04 mg/dL Final    Comment: Unsatisfactory specimen - hemolyzed  NIKOLAI RN NOTIFIED INFUSION ZS 1203 12/21/17       GFR Estimate 12/21/2017 Canceled, Test credited  >60 mL/min/1.7m2 Final    Comment: Unsatisfactory specimen - hemolyzed  NIKOLAI RN NOTIFIED INFUSION ZS 1203 12/21/17       GFR Estimate If Black 12/21/2017 Canceled, Test credited  >60 mL/min/1.7m2 Final    Comment: Unsatisfactory specimen - hemolyzed  NIKOLAI RN NOTIFIED INFUSION ZS 1203 12/21/17       Calcium 12/21/2017 Canceled, Test credited  8.5 - 10.1 mg/dL Final    Comment: Unsatisfactory specimen - hemolyzed  NIKOLAI RN NOTIFIED INFUSION ZS 1203 12/21/17       Bilirubin Total 12/21/2017 Canceled, Test credited  0.2 - 1.3 mg/dL Final    Comment: Unsatisfactory specimen - hemolyzed  NIKOLAI RN NOTIFIED INFUSION ZS 1203 12/21/17       Albumin 12/21/2017 Canceled, Test credited  3.4 - 5.0 g/dL Final    Comment: Unsatisfactory specimen - hemolyzed  NIKOLAI RN NOTIFIED INFUSION ZS 1203 12/21/17       Protein Total 12/21/2017 Canceled, Test credited  6.8 - 8.8 g/dL Final    Comment: Unsatisfactory specimen - hemolyzed  NIKOLAI RN NOTIFIED INFUSION ZS 1203 12/21/17       Alkaline Phosphatase 12/21/2017 Canceled, Test credited  40 - 150 U/L Final    Comment: Unsatisfactory specimen - hemolyzed  NIKOLAI RN NOTIFIED INFUSION ZS 1203 12/21/17       ALT 12/21/2017 Canceled, Test credited  0 - 50 U/L Final    Comment: Unsatisfactory specimen - hemolyzed  NIKOLAI RN NOTIFIED INFUSION ZS 1203 12/21/17       AST 12/21/2017 Canceled, Test credited  0 - 45 U/L Final    Comment: Unsatisfactory specimen - hemolyzed  NIKOLAI RN NOTIFIED INFUSION ZS 1203 12/21/17       TSH 12/21/2017 Canceled, Test credited  0.40 - 4.00 mU/L Final     Comment: Unsatisfactory specimen - hemolyzed  NIKOLAI RN NOTIFIED INFUSION ZS 1203 12/21/17       Sodium 12/21/2017 136  133 - 144 mmol/L Final     Potassium 12/21/2017 4.0  3.4 - 5.3 mmol/L Final     Chloride 12/21/2017 105  94 - 109 mmol/L Final     Carbon Dioxide 12/21/2017 24  20 - 32 mmol/L Final     Anion Gap 12/21/2017 7  3 - 14 mmol/L Final     Glucose 12/21/2017 94  70 - 99 mg/dL Final     Urea Nitrogen 12/21/2017 15  7 - 30 mg/dL Final     Creatinine 12/21/2017 0.64  0.52 - 1.04 mg/dL Final     GFR Estimate 12/21/2017 >90  >60 mL/min/1.7m2 Final    Non  GFR Calc     GFR Estimate If Black 12/21/2017 >90  >60 mL/min/1.7m2 Final    African American GFR Calc     Calcium 12/21/2017 8.8  8.5 - 10.1 mg/dL Final     Bilirubin Total 12/21/2017 0.2  0.2 - 1.3 mg/dL Final     Albumin 12/21/2017 3.9  3.4 - 5.0 g/dL Final     Protein Total 12/21/2017 7.9  6.8 - 8.8 g/dL Final     Alkaline Phosphatase 12/21/2017 64  40 - 150 U/L Final     ALT 12/21/2017 32  0 - 50 U/L Final     AST 12/21/2017 20  0 - 45 U/L Final     TSH 12/21/2017 0.96  0.40 - 4.00 mU/L Final            Assessment and plan:  (C79.9) Metastatic malignant melanoma (H)  (primary encounter diagnosis)  Patient will continue with development according to the treatment plan.  I will see her again in 1 month or sooner if there are new developments or concerns.    (J45.20) Mild intermittent asthma without complication  Patient has not been well controlled lately.    (F32.0) Mild major depression (H)  Patient currently on Effexor-ER at 1250 mg daily.    The patient is ready to learn, no apparent learning barriers were identified.  Diagnosis and treatment plans were explained to the patient. The patient expressed understanding of the content. The patient asked appropriate questions. The patient questions were answered to her satisfaction.    Chart documentation with Dragon Voice recognition Software. Although reviewed after completion, some words  and grammatical errors may remain.

## 2018-01-18 NOTE — MR AVS SNAPSHOT
After Visit Summary   1/18/2018    Doretha Fernandez    MRN: [id number]           Patient Information     Date Of Birth          1975        Visit Information        Provider Department      1/18/2018 10:30 AM Kathy Richards MD [facility] ONCOLOGY       Follow-ups after your visit        Follow-up notes from your care team     Return in about 4 weeks (around 2/15/2018) for Schedule for chemotherapy as per treatment plan.      Your next 10 appointments already scheduled     Jan 19, 2018  8:30 AM CST   Lymphedema Treatment with Anastasiya Rodriguez PTA   [facility] Lymphedema ([facility])    [address] [facility] [address]   [phone]            Jan 23, 2018  3:00 PM CST   Lymphedema Treatment with Anastasiya Rodriguez PTA   [facility] Lymphedema ([facility])    [address] [facility] [address]   [phone]            Jan 25, 2018  3:00 PM CST   Lymphedema Treatment with Anastasiya Rodriguez PTA   [facility] Lymphedema ([facility])    [address] [facility] [address]   [phone]            Jan 30, 2018  3:00 PM CST   Lymphedema Treatment with Anastasiya Rodriguez PTA   [facility] Lymphedema ([facility])    [address] [facility] [address]   [phone]            Feb 01, 2018 11:00 AM CST   Lymphedema Treatment with Anastasiya Rodriguez PTA   [facility] Lymphedema ([facility])    [address] [facility] [address]   [phone]            Feb 01, 2018  1:30 PM CST   Level 2 with ROOM 5 [facility] Infusion ([facility])    [facility]  [address]  [facility] [address]   [phone]            Feb 06, 2018  3:00 PM CST   Lymphedema Treatment with Anna Graham PT   [facility] Lymphedema ([facility])    [address] [facility] [place]  [address]   [phone]            Feb 08, 2018  3:00 PM CST   Lymphedema Treatment with Anastasiya Rodriguez PTA   [facility] Lymphedema ([facility])    [address] [place]  [facility] [address]   [phone]            Feb 15, 2018 11:00 AM CST   Return Visit with Kathy Richards MD   [facility] ([facility])    [facility]  [address]  [facility] [address]   [phone]            Feb 15, 2018 11:30 AM CST   Level 2 with ROOM 3 [facility]fusion ([facility])    [facility]  [address]  [facility] [address]   [phone]              Who to contact     If you have questions or need follow up information about today's clinic visit or your schedule please contact [facility] directly at [phone].  Normal or non-critical lab and imaging results will be communicated to you by Holairahart, letter or phone within 4 business days after the clinic has received the results. If you do not hear from us within 7 days, please contact the clinic through Holair[employer]art or phone. If you have a critical or abnormal lab result, we will notify you by phone as soon as possible.  Submit refill requests through [employer] or call your pharmacy and they will forward the refill request to us. Please allow 3 business days for your refill to be completed.          Additional Information About Your Visit        HolairaharPombai Information     [employer] gives you secure access to your electronic health record. If you see a primary care provider, you can also send messages to your care team and make appointments. If you have questions, please call your primary care clinic.  If you do not have a primary care provider, please call [phone] and they will assist you.        Care EveryWhere ID     This is your Care EveryWhere ID. This could be used by other organizations to access your [facility]  "records  [id number]        Your Vitals Were     Pulse Temperature Respirations Height Pulse Oximetry Breastfeeding?    95 99.1  F (37.3  C) (Tympanic) 18 1.6 m (5' 3\") 100% No    BMI (Body Mass Index)                   38.16 kg/m2            Blood Pressure from Last 3 Encounters:   01/18/18 123/83   01/09/18 107/74   12/21/17 114/62    Weight from Last 3 Encounters:   01/18/18 97.7 kg (215 lb 6.4 oz)   12/07/17 95.5 kg (210 lb 9.6 oz)   11/24/17 94.3 kg (207 lb 12.8 oz)              Today, you had the following     No orders found for display       Primary Care Provider Office Phone # Fax #    Anna Naidu [phone] [address] [address] [facility] [address]        Equal Access to Services     [facility]: Hadii aad mariola hadasho Soomaali, waaxda luqadaha, qaybta kaalmada adeegyada, waxay anyain hayzeyadn liliam dentonn . So [facility] [phone].    ATENCIÓN: Si habla español, tiene a mcdonnell disposición servicios gratuitos de asistencia lingüística. Diana al [phone].    We comply with applicable federal civil rights laws and [place] laws. We do not discriminate on the basis of race, color, national origin, age, disability, sex, sexual orientation, or gender identity.            Thank you!     Thank you for choosing [facility]  for your care. Our goal is always to provide you with excellent care. Hearing back from our patients is one way we can continue to improve our services. Please take a few minutes to complete the written survey that you may receive in the mail after your visit with us. Thank you!             Your Updated Medication List - Protect others around you: Learn how to safely use, store and throw away your medicines at www.disposemymeds.org.          This list is accurate as of: 1/18/18 11:24 AM.  Always use your most recent med list.                   Brand Name Dispense Instructions for use Diagnosis    acetaminophen 325 MG tablet    TYLENOL    100 tablet    " "Take 2 tablets (650 mg) by mouth every 4 hours as needed for other (mild pain)    Malignant melanoma of left upper extremity including shoulder (H)       aspirin 81 MG EC tablet      Take 81 mg by mouth daily        LORazepam 1 MG tablet    ATIVAN    30 tablet    Take 1 tablet (1 mg) by mouth every 8 hours as needed for anxiety    Anxiety, Acute intractable tension-type headache       * order for DME     1 Units    Equipment being ordered: 20-30mmHg compression sleeve and 20-30mmHg compression glove\" x2 pair    Lymphedema of left arm       * order for DME     1 each    Equipment being ordered: x2 Wearease compression shirt    Secondary lymphedema       oxyCODONE IR 5 MG tablet    ROXICODONE    40 tablet    Take 1-3 tablets (5-15 mg) by mouth every 4 hours as needed for pain maximum 12 tablet(s) per day    Cellulitis of chest wall       rOPINIRole 0.25 MG tablet    REQUIP    30 tablet    Take 1 tablet (0.25 mg) by mouth At Bedtime    Restless leg syndrome       senna-docusate 8.6-50 MG per tablet    SENOKOT-S;PERICOLACE    60 tablet    Take 1 tablet by mouth 2 times daily as needed for constipation    Malignant melanoma of left upper extremity including shoulder (H)       venlafaxine 150 MG Tb24 24 hr tablet    EFFEXOR-ER    30 each    Take 1 tablet (150 mg) by mouth daily (with breakfast)    Mild major depression (H)       Vitamin D3 3000 UNITS Tabs     30 tablet    Take 3,000 Int'l Units by mouth daily    Vitamin D deficiency       zonisamide 25 MG capsule    Zonegran    90 capsule    Take 1 tablet (25 mg) once daily for 1 week, then 2 tablets once daily for 1 week, then 3 tablets once daily for 1 week, then 4 tablets once daily for 1 week.    Headache disorder       * Notice:  This list has 2 medication(s) that are the same as other medications prescribed for you. Read the directions carefully, and ask your doctor or other care provider to review them with you.      "

## 2018-01-18 NOTE — PROGRESS NOTES
Infusion Nursing Note:  Doretha Fernandez presents today for C3D1 Opdivo.    Patient seen by provider today: Yes: Dr. Richards   present during visit today: Not Applicable.    Note: N/A.    Intravenous Access:  Labs drawn without difficulty.  Peripheral IV placed.    Treatment Conditions:  Lab Results   Component Value Date     01/18/2018                   Lab Results   Component Value Date    POTASSIUM 3.9 01/18/2018           No results found for: MAG         Lab Results   Component Value Date    CR 0.58 01/18/2018                   Lab Results   Component Value Date    PATRICIA 8.8 01/18/2018                Lab Results   Component Value Date    BILITOTAL 0.2 01/18/2018           Lab Results   Component Value Date    ALBUMIN 3.6 01/18/2018                    Lab Results   Component Value Date    ALT 34 01/18/2018           Lab Results   Component Value Date    AST 15 01/18/2018     Results reviewed, labs MET treatment parameters, ok to proceed with treatment.        Post Infusion Assessment:  Patient tolerated infusion without incident.  Blood return noted pre and post infusion.  Site patent and intact, free from redness, edema or discomfort.  No evidence of extravasations.  Access discontinued per protocol.    Discharge Plan:   Patient discharged in stable condition accompanied by: self.  Departure Mode: Ambulatory.  Pt to return on 2/1/18 at 1:30 pm for Opdivo.    Tracy Moss RN

## 2018-01-18 NOTE — NURSING NOTE
"Oncology Rooming Note    January 18, 2018 10:56 AM   Doretha Fernandez is a 42 year old female who presents for:    Chief Complaint   Patient presents with     Chemotherapy     6 week follow up Malignant Melanoma. Review lab results.      Initial Vitals: /83 (BP Location: Right arm, Patient Position: Sitting, Cuff Size: Adult Large)  Pulse 95  Temp 99.1  F (37.3  C) (Tympanic)  Resp 18  Ht 1.6 m (5' 3\")  Wt 97.7 kg (215 lb 6.4 oz)  SpO2 100%  Breastfeeding? No  BMI 38.16 kg/m2 Estimated body mass index is 38.16 kg/(m^2) as calculated from the following:    Height as of this encounter: 1.6 m (5' 3\").    Weight as of this encounter: 97.7 kg (215 lb 6.4 oz). Body surface area is 2.08 meters squared.  Mild Pain (2) Comment: left shoulder greater than right shoulder.    No LMP recorded.  Allergies reviewed: Yes  Medications reviewed: Yes    Medications: Medication refills not needed today.  Pharmacy name entered into [employer]: [facility] [place], [place] - [address]    Clinical concerns:  6 week follow up Malignant Melanoma. Review lab results. Noticing an increase in fatigue following her last Opdivo. Feels winded after movements. C/o 2/10 bilateral shoulder pains left side greater than right.     7 minutes for nursing intake (face to face time)     Dai Canales, [place]            "

## 2018-01-18 NOTE — LETTER
1/18/2018         RE: Doretha Fernandez  [address]  [place] [address]        Dear Colleague,    Thank you for referring your patient, Doretha Fernandez, to the [facility]. Please see a copy of my visit note below.    Hematology/ Oncology Follow-up Visit:  Jan 18, 2018    Reason for Visit:   Chief Complaint   Patient presents with     Chemotherapy     6 week follow up Malignant Melanoma. Review lab results.          Interval History:  Patient is here today for follow-up.  She has been receiving immunotherapy with development.  She has been tolerating treatment very well without any significant side effects.  She denies any nausea or vomiting or diarrhea.  She denies any fever or chills.  She denies any shortness of breath or cough or wheezing.  She is dealing with some lymphedema of the left arm which has been gradually improving.    Review Of Systems:  Constitutional: Negative for fever, chills, and night sweats.  Skin: negative.  Eyes: negative.  Ears/Nose/Throat: negative.  Respiratory: No shortness of breath, dyspnea on exertion, cough, or hemoptysis.  Cardiovascular: negative.  Gastrointestinal: negative.  Genitourinary: negative.  Musculoskeletal: negative.  Neurologic: negative.  Psychiatric: negative.  Hematologic/Lymphatic/Immunologic: negative.  Endocrine: negative.    All other ROS negative unless mentioned in interval history.    Past medical, social, surgical, and family histories reviewed.    Allergies:  Allergies as of 01/18/2018 - Nicola as Reviewed 01/18/2018   Allergen Reaction Noted     Compazine [prochlorperazine] Fatigue 10/12/2017     Fentanyl Other (See Comments) 10/12/2017     Erythrocin Nausea and Vomiting 03/20/2008     Zithromax [azithromycin dihydrate] Diarrhea 02/17/2012       Current Medications:  Current Outpatient Prescriptions   Medication Sig Dispense Refill     order for DME Equipment being ordered: x2 Wearease compression shirt 1 each 0     venlafaxine  "(EFFEXOR-ER) 150 MG TB24 24 hr tablet Take 1 tablet (150 mg) by mouth daily (with breakfast) 30 each 0     order for DME Equipment being ordered: 20-30mmHg compression sleeve and 20-30mmHg compression glove\" x2 pair 1 Units 0     aspirin 81 MG EC tablet Take 81 mg by mouth daily       acetaminophen (TYLENOL) 325 MG tablet Take 2 tablets (650 mg) by mouth every 4 hours as needed for other (mild pain) 100 tablet 0     Cholecalciferol (VITAMIN D3) 3000 UNITS TABS Take 3,000 Int'l Units by mouth daily 30 tablet 3     rOPINIRole (REQUIP) 0.25 MG tablet Take 1 tablet (0.25 mg) by mouth At Bedtime (Patient not taking: Reported on 1/18/2018) 30 tablet 11     oxyCODONE IR (ROXICODONE) 5 MG tablet Take 1-3 tablets (5-15 mg) by mouth every 4 hours as needed for pain maximum 12 tablet(s) per day (Patient not taking: Reported on 1/18/2018) 40 tablet 0     senna-docusate (SENOKOT-S;PERICOLACE) 8.6-50 MG per tablet Take 1 tablet by mouth 2 times daily as needed for constipation (Patient not taking: Reported on 1/18/2018) 60 tablet 1     LORazepam (ATIVAN) 1 MG tablet Take 1 tablet (1 mg) by mouth every 8 hours as needed for anxiety (Patient not taking: Reported on 1/18/2018) 30 tablet 0     zonisamide (ZONEGRAN) 25 MG capsule Take 1 tablet (25 mg) once daily for 1 week, then 2 tablets once daily for 1 week, then 3 tablets once daily for 1 week, then 4 tablets once daily for 1 week. (Patient not taking: Reported on 1/18/2018) 90 capsule 1        Physical Exam:  /83 (BP Location: Right arm, Patient Position: Sitting, Cuff Size: Adult Large)  Pulse 95  Temp 99.1  F (37.3  C) (Tympanic)  Resp 18  Ht 1.6 m (5' 3\")  Wt 97.7 kg (215 lb 6.4 oz)  SpO2 100%  Breastfeeding? No  BMI 38.16 kg/m2  Wt Readings from Last 12 Encounters:   01/18/18 97.7 kg (215 lb 6.4 oz)   12/07/17 95.5 kg (210 lb 9.6 oz)   11/24/17 94.3 kg (207 lb 12.8 oz)   11/22/17 93.9 kg (207 lb)   11/14/17 95.9 kg (211 lb 6.7 oz)   11/09/17 94.8 kg (208 lb 14.4 " oz)   11/02/17 96.1 kg (211 lb 12.8 oz)   11/02/17 95.8 kg (211 lb 4.8 oz)   10/27/17 95.8 kg (211 lb 1.6 oz)   10/23/17 94.5 kg (208 lb 5.4 oz)   10/18/17 94.7 kg (208 lb 12.8 oz)   10/13/17 94.6 kg (208 lb 8 oz)     ECOG performance status: 0  GENERAL APPEARANCE: Healthy, alert and in no acute distress.  HEENT: Sclerae anicteric. PERRLA. Oropharynx without ulcers, lesions, or thrush.  NECK: Supple. No asymmetry or masses.  LYMPHATICS: No palpable cervical, supraclavicular, axillary, or inguinal lymphadenopathy.  RESP: Lungs clear to auscultation bilaterally without rales, rhonchi or wheezes.  CARDIOVASCULAR: Regular rate and rhythm. Normal S1, S2; no S3 or S4. No murmur, gallop, or rub.  ABDOMEN: Soft, nontender. Bowel sounds normal. No palpable organomegaly or masses.  MUSCULOSKELETAL: Extremities without gross deformities noted. No edema of bilateral lower extremities.  SKIN: No suspicious lesions or rashes.  NEURO: Alert and oriented x 3. Cranial nerves II-XII grossly intact.  PSYCHIATRIC: Mentation and affect appear normal.    Laboratory/Imaging Studies:  No visits with results within 2 Week(s) from this visit.  Latest known visit with results is:    Infusion Therapy Visit on 12/21/2017   Component Date Value Ref Range Status     Sodium 12/21/2017 Canceled, Test credited  133 - 144 mmol/L Final    Comment: Unsatisfactory specimen - hemolyzed  NIKOLAI RN NOTIFIED INFUSION ZS 1203 12/21/17       Potassium 12/21/2017 Canceled, Test credited  3.4 - 5.3 mmol/L Final    Comment: Unsatisfactory specimen - hemolyzed  NIKOLAI RN NOTIFIED INFUSION ZS 1203 12/21/17       Chloride 12/21/2017 Canceled, Test credited  94 - 109 mmol/L Final    Comment: Unsatisfactory specimen - hemolyzed  NIKOLAI RN NOTIFIED INFUSION ZS 1203 12/21/17       Carbon Dioxide 12/21/2017 Canceled, Test credited  20 - 32 mmol/L Final    Comment: Unsatisfactory specimen - hemolyzed  NIKOLAI RN NOTIFIED INFUSION ZS 1203 12/21/17       Anion Gap 12/21/2017 Canceled,  Test credited  6 - 17 mmol/L Final    Comment: Unsatisfactory specimen - hemolyzed  NIKOLAI RN NOTIFIED INFUSION ZS 1203 12/21/17       Glucose 12/21/2017 Canceled, Test credited  70 - 99 mg/dL Final    Comment: Unsatisfactory specimen - hemolyzed  NIKOLAI RN NOTIFIED INFUSION ZS 1203 12/21/17       Urea Nitrogen 12/21/2017 Canceled, Test credited  7 - 30 mg/dL Final    Comment: Unsatisfactory specimen - hemolyzed  NIKOLAI RN NOTIFIED INFUSION ZS 1203 12/21/17       Creatinine 12/21/2017 Canceled, Test credited  0.52 - 1.04 mg/dL Final    Comment: Unsatisfactory specimen - hemolyzed  NIKOLAI RN NOTIFIED INFUSION ZS 1203 12/21/17       GFR Estimate 12/21/2017 Canceled, Test credited  >60 mL/min/1.7m2 Final    Comment: Unsatisfactory specimen - hemolyzed  NIKOLAI RN NOTIFIED INFUSION ZS 1203 12/21/17       GFR Estimate If Black 12/21/2017 Canceled, Test credited  >60 mL/min/1.7m2 Final    Comment: Unsatisfactory specimen - hemolyzed  NIKOLAI RN NOTIFIED INFUSION ZS 1203 12/21/17       Calcium 12/21/2017 Canceled, Test credited  8.5 - 10.1 mg/dL Final    Comment: Unsatisfactory specimen - hemolyzed  NIKOLAI RN NOTIFIED INFUSION ZS 1203 12/21/17       Bilirubin Total 12/21/2017 Canceled, Test credited  0.2 - 1.3 mg/dL Final    Comment: Unsatisfactory specimen - hemolyzed  NIKOLAI RN NOTIFIED INFUSION ZS 1203 12/21/17       Albumin 12/21/2017 Canceled, Test credited  3.4 - 5.0 g/dL Final    Comment: Unsatisfactory specimen - hemolyzed  NIKOLAI RN NOTIFIED INFUSION ZS 1203 12/21/17       Protein Total 12/21/2017 Canceled, Test credited  6.8 - 8.8 g/dL Final    Comment: Unsatisfactory specimen - hemolyzed  NIKOLAI RN NOTIFIED INFUSION ZS 1203 12/21/17       Alkaline Phosphatase 12/21/2017 Canceled, Test credited  40 - 150 U/L Final    Comment: Unsatisfactory specimen - hemolyzed  NIKOLAI RN NOTIFIED INFUSION ZS 1203 12/21/17       ALT 12/21/2017 Canceled, Test credited  0 - 50 U/L Final    Comment: Unsatisfactory specimen - hemolyzed  NIKOLAI RN NOTIFIED INFUSION ZS  1203 12/21/17       AST 12/21/2017 Canceled, Test credited  0 - 45 U/L Final    Comment: Unsatisfactory specimen - hemolyzed  NIKOLAI RN NOTIFIED INFUSION ZS 1203 12/21/17       TSH 12/21/2017 Canceled, Test credited  0.40 - 4.00 mU/L Final    Comment: Unsatisfactory specimen - hemolyzed  NIKOLAI RN NOTIFIED INFUSION ZS 1203 12/21/17       Sodium 12/21/2017 136  133 - 144 mmol/L Final     Potassium 12/21/2017 4.0  3.4 - 5.3 mmol/L Final     Chloride 12/21/2017 105  94 - 109 mmol/L Final     Carbon Dioxide 12/21/2017 24  20 - 32 mmol/L Final     Anion Gap 12/21/2017 7  3 - 14 mmol/L Final     Glucose 12/21/2017 94  70 - 99 mg/dL Final     Urea Nitrogen 12/21/2017 15  7 - 30 mg/dL Final     Creatinine 12/21/2017 0.64  0.52 - 1.04 mg/dL Final     GFR Estimate 12/21/2017 >90  >60 mL/min/1.7m2 Final    Non  GFR Calc     GFR Estimate If Black 12/21/2017 >90  >60 mL/min/1.7m2 Final    African American GFR Calc     Calcium 12/21/2017 8.8  8.5 - 10.1 mg/dL Final     Bilirubin Total 12/21/2017 0.2  0.2 - 1.3 mg/dL Final     Albumin 12/21/2017 3.9  3.4 - 5.0 g/dL Final     Protein Total 12/21/2017 7.9  6.8 - 8.8 g/dL Final     Alkaline Phosphatase 12/21/2017 64  40 - 150 U/L Final     ALT 12/21/2017 32  0 - 50 U/L Final     AST 12/21/2017 20  0 - 45 U/L Final     TSH 12/21/2017 0.96  0.40 - 4.00 mU/L Final            Assessment and plan:  (C79.9) Metastatic malignant melanoma (H)  (primary encounter diagnosis)  Patient will continue with development according to the treatment plan.  I will see her again in 1 month or sooner if there are new developments or concerns.    (J45.20) Mild intermittent asthma without complication  Patient has not been well controlled lately.    (F32.0) Mild major depression (H)  Patient currently on Effexor-ER at 1250 mg daily.    The patient is ready to learn, no apparent learning barriers were identified.  Diagnosis and treatment plans were explained to the patient. The patient expressed  understanding of the content. The patient asked appropriate questions. The patient questions were answered to her satisfaction.    Chart documentation with Dragon Voice recognition Software. Although reviewed after completion, some words and grammatical errors may remain.    Again, thank you for allowing me to participate in the care of your patient.        Sincerely,        Kathy Richards MD

## 2018-01-19 ENCOUNTER — HOSPITAL ENCOUNTER (OUTPATIENT)
Dept: PHYSICAL THERAPY | Facility: CLINIC | Age: 43
Setting detail: THERAPIES SERIES
End: 2018-01-19
Attending: PHYSICIAN ASSISTANT
Payer: COMMERCIAL

## 2018-01-19 PROCEDURE — 40000099 ZZH STATISTIC LYMPHEDEMA VISIT: Performed by: REHABILITATION PRACTITIONER

## 2018-01-19 PROCEDURE — 97140 MANUAL THERAPY 1/> REGIONS: CPT | Mod: GP | Performed by: REHABILITATION PRACTITIONER

## 2018-01-23 ENCOUNTER — HOSPITAL ENCOUNTER (OUTPATIENT)
Dept: PHYSICAL THERAPY | Facility: CLINIC | Age: 43
Setting detail: THERAPIES SERIES
End: 2018-01-23
Attending: PHYSICIAN ASSISTANT
Payer: COMMERCIAL

## 2018-01-23 DIAGNOSIS — D68.52 PROTHROMBIN MUTATION (H): Primary | ICD-10-CM

## 2018-01-23 PROCEDURE — 40000099 ZZH STATISTIC LYMPHEDEMA VISIT: Performed by: REHABILITATION PRACTITIONER

## 2018-01-23 PROCEDURE — 97140 MANUAL THERAPY 1/> REGIONS: CPT | Mod: GP | Performed by: REHABILITATION PRACTITIONER

## 2018-01-24 NOTE — TELEPHONE ENCOUNTER
"Requested Prescriptions   Pending Prescriptions Disp Refills     aspirin 81 MG EC tablet  Last Written Prescription Date:  Historical  Last Fill Quantity: \,  # refills: \   Last Office Visit with [facility] P [facility] prescribing provider:  11/22/2017   Future Office Visit:    Next 5 appointments (look out 90 days)     Feb 15, 2018 11:00 AM CST   Return Visit with Kathy Richards MD   [facility] ([facility])    [facility]  [address]  [facility] [place] [facility] [place] [address]   [phone]            Apr 09, 2018  2:30 PM CDT   Return Visit with Lowell Bullock MD   [facility]    [address] [address]   [phone]                        Sig: Take 1 tablet (81 mg) by mouth daily    Analgesics (Non-Narcotic Tylenol and ASA Only) Passed    1/23/2018  8:44 PM       Passed - Recent or future visit with authorizing provider's specialty    Patient had office visit in the last year or has a visit in the next 30 days with authorizing provider.  See \"Patient Info\" tab in inbasket, or \"Choose Columns\" in Meds & Orders section of the refill encounter.            Passed - Patient is age 20 years or older    If ASA is flagged for ages under 20 years old. Forward to provider for confirmation Ryes Syndrome is not a concern.                "

## 2018-01-25 ENCOUNTER — TELEPHONE (OUTPATIENT)
Dept: PHYSICAL THERAPY | Facility: CLINIC | Age: 43
End: 2018-01-25

## 2018-01-25 DIAGNOSIS — F32.0 MILD MAJOR DEPRESSION (H): ICD-10-CM

## 2018-01-25 RX ORDER — VENLAFAXINE HYDROCHLORIDE 150 MG/1
150 TABLET, EXTENDED RELEASE ORAL
Qty: 30 EACH | Refills: 0 | Status: SHIPPED | OUTPATIENT
Start: 2018-01-25 | End: 2018-02-22

## 2018-01-25 NOTE — TELEPHONE ENCOUNTER
Pt arrived to clinic for Flexitouch pump trial, Hayde from [employer] took UE measurements pre and post trial. Pt had no complaints with trial.

## 2018-01-29 NOTE — TELEPHONE ENCOUNTER
Routing refill request to provider for review/approval because:  Medication is reported/historical    Trang Alatorre RN

## 2018-01-30 NOTE — ADDENDUM NOTE
Encounter addended by: Anna Graham, PT on: 1/30/2018  3:53 PM<BR>     Actions taken: Flowsheet accepted, Sign clinical note

## 2018-01-30 NOTE — PROGRESS NOTES
Outpatient Physical Therapy Progress Note     Patient: Doretha Fernandez  : 1975    Beginning/End Dates of Reporting Period:  2017 to 2018    Referring Provider: KEITH Schultz    Therapy Diagnosis: L-breast, LUE and L-upper back lymphedema     Client Self Report: shoulder is still bothering me, purchasing shirt on Thursday    Objective Measurements:  Objective Measure: LUE girth  Details: +5.5%     Outcome Measures (most recent score):   LLIS = 41 on date of initial evaluation; pt will again complete LLIS at time of discharge    Goals:  Goal Identifier stg   Goal Description pt to be independent with self-MLD of LUE for decreased edema reduction response and improved comfort with use   Target Date 17   Date Met  17   Progress:     Goal Identifier stg   Goal Description pt to be independent with donning, doffing and care of compression sleeve and glove for LUE management during upcoming flight/vacation   Target Date 17   Date Met  17   Progress:     Goal Identifier ltg   Goal Description pt to have increased LUE GH flexion and abduction to 130 degrees to increase use of LUE for overhead activities and ADLs   Target Date 18   Date Met   (goal progressing)   Progress:     Goal Identifier ltg   Goal Description pt to be independent with LUE lymphedema management via HEP, elevation, compression garment wear and self-MLD   Target Date 18   Date Met      Progress:     Goal Identifier ltg   Goal Description pt to have at least 8 point improvement on LLIS due to decreased lymphedema and increased comfort and ROM in LUE   Target Date 18   Date Met      Progress:       Progress Toward Goals:   Patient is making fair progress toward goals. L-shoulder ROM has improved but LUE girth has increased. Pt is in the process of getting reviewed for a Flexitouch compression pump as well as purchasing compression shirts; if LUE girth continues to increase with light  compression may need to initiate GCB    Plan:  Continue therapy per current plan of care.    Discharge:  No

## 2018-02-01 ENCOUNTER — INFUSION THERAPY VISIT (OUTPATIENT)
Dept: INFUSION THERAPY | Facility: CLINIC | Age: 43
End: 2018-02-01
Attending: INTERNAL MEDICINE
Payer: COMMERCIAL

## 2018-02-01 VITALS
DIASTOLIC BLOOD PRESSURE: 78 MMHG | SYSTOLIC BLOOD PRESSURE: 141 MMHG | HEART RATE: 75 BPM | RESPIRATION RATE: 16 BRPM | TEMPERATURE: 96.9 F

## 2018-02-01 DIAGNOSIS — C43.62 MALIGNANT MELANOMA OF LEFT UPPER EXTREMITY INCLUDING SHOULDER (H): Primary | ICD-10-CM

## 2018-02-01 PROCEDURE — 96413 CHEMO IV INFUSION 1 HR: CPT

## 2018-02-01 PROCEDURE — 25000128 H RX IP 250 OP 636: Performed by: INTERNAL MEDICINE

## 2018-02-01 RX ADMIN — SODIUM CHLORIDE 280 MG: 9 INJECTION, SOLUTION INTRAVENOUS at 14:41

## 2018-02-01 RX ADMIN — SODIUM CHLORIDE 500 ML: 9 INJECTION, SOLUTION INTRAVENOUS at 14:22

## 2018-02-01 NOTE — MR AVS SNAPSHOT
After Visit Summary   2/1/2018    Doretha Fernandez    MRN: [id number]           Patient Information     Date Of Birth          1975        Visit Information        Provider Department      2/1/2018 1:30 PM ROOM 5 [facility]        Today's Diagnoses     Malignant melanoma of left upper extremity including shoulder (H)    -  1       Follow-ups after your visit        Your next 10 appointments already scheduled     Feb 06, 2018  3:00 PM CST   Lymphedema Treatment with Anna Graham PT   [facility] ([facility])    [address] [address]   [phone]            Feb 08, 2018  3:00 PM CST   Lymphedema Treatment with Anastasiya Rodriguez PTA   [facility] ([facility])    [address] [address]   [phone]            Feb 13, 2018  3:00 PM CST   Lymphedema Treatment with Anastasiya Rodriguez PTA   [facility] ([facility])    [address] [address]   [phone]            Feb 15, 2018  9:00 AM CST   Lymphedema Treatment with Anastasiya Rodriguez PTA   [facility] ([facility])    [address] [address]   [phone]            Feb 15, 2018 10:00 AM CST   LAB with [facility] ([facility])    [address] [address]   [phone]           Please do not eat 10-12 hours before your appointment if you are coming in fasting for labs on lipids, cholesterol, or glucose (sugar). This does not apply to pregnant women. Water, hot tea and black coffee (with nothing added) are okay. Do not drink other fluids, diet soda or chew gum.            Feb 15, 2018 11:00 AM CST   Return Visit with Kathy Richards MD   [facility] ([facility])    [facility]  [address]  [facility] [place] [facility] [place]  [address]   [phone]            Feb 15, 2018 11:30 AM CST   Level 2 with ROOM 3 [facility] ([facility])    n [facility] Ctr [facility]  [address]  [facility] [address]   [phone]            Feb 20, 2018  3:00 PM CST   Lymphedema Treatment with Anastasiya Heflin, PTA   [facility] ([facility])    [address]  [facility] [address]   [phone]            Feb 22, 2018  3:00 PM CST   Lymphedema Treatment with Anastasiya Heflin, PTA   [facility] ([facility])    [address]  [facility] [address]   [phone]            Feb 27, 2018  3:00 PM CST   Lymphedema Treatment with Anastasiya Heflin, PTA   [facility] ([facility])    [address] [address]   [phone]              Who to contact     If you have questions or need follow up information about today's clinic visit or your schedule please contact [facility] directly at [phone].  Normal or non-critical lab and imaging results will be communicated to you by [employer], letter or phone within 4 business days after the clinic has received the results. If you do not hear from us within 7 days, please contact the clinic through [employer] or phone. If you have a critical or abnormal lab result, we will notify you by phone as soon as possible.  Submit refill requests through [employer] or call your pharmacy and they will forward the refill request to us. Please allow 3 business days for your refill to be completed.          Additional Information About Your Visit        [employer] Information     [employer] gives you secure access to your electronic health record. If you see a primary care provider, you can also send messages to your care team and make appointments. If you have questions, please call your primary care clinic.  If you do not have a primary care provider,  please call [phone] and they will assist you.        Care EveryWhere ID     This is your Care EveryWhere ID. This could be used by other organizations to access your [facility] medical records  [id number]        Your Vitals Were     Pulse Temperature Respirations             75 96.9  F (36.1  C) 16          Blood Pressure from Last 3 Encounters:   02/01/18 141/78   01/18/18 111/66   01/18/18 123/83    Weight from Last 3 Encounters:   01/18/18 97.7 kg (215 lb 6.4 oz)   12/07/17 95.5 kg (210 lb 9.6 oz)   11/24/17 94.3 kg (207 lb 12.8 oz)              Today, you had the following     No orders found for display       Primary Care Provider Office Phone # Fax #    Anna Naidu [phone] [address]  [facility] [address]        Equal Access to Services     [facility]: Hadii aad ku hadasho Soomaali, waaxda luqadaha, qaybta kaalmada adeegyada, pancho queen hayzeyadn liliam moore . So [facility] [phone].    ATENCIÓN: Si habla español, tiene a mcdonnell disposición servicios gratuitos de asistencia lingüística. Diana al [phone].    We comply with applicable federal civil rights laws and [place] laws. We do not discriminate on the basis of race, color, national origin, age, disability, sex, sexual orientation, or gender identity.            Thank you!     Thank you for choosing [facility]  for your care. Our goal is always to provide you with excellent care. Hearing back from our patients is one way we can continue to improve our services. Please take a few minutes to complete the written survey that you may receive in the mail after your visit with us. Thank you!             Your Updated Medication List - Protect others around you: Learn how to safely use, store and throw away your medicines at www.disposemymeds.org.          This list is accurate as of 2/1/18  4:12 PM.  Always use your most recent med list.                   Brand Name Dispense Instructions for use  "Diagnosis    acetaminophen 325 MG tablet    TYLENOL    100 tablet    Take 2 tablets (650 mg) by mouth every 4 hours as needed for other (mild pain)    Malignant melanoma of left upper extremity including shoulder (H)       aspirin 81 MG EC tablet     90 tablet    Take 1 tablet (81 mg) by mouth daily    Prothrombin mutation (H)       LORazepam 1 MG tablet    ATIVAN    30 tablet    Take 1 tablet (1 mg) by mouth every 8 hours as needed for anxiety    Anxiety, Acute intractable tension-type headache       * order for DME     1 Units    Equipment being ordered: 20-30mmHg compression sleeve and 20-30mmHg compression glove\" x2 pair    Lymphedema of left arm       * order for DME     1 each    Equipment being ordered: x2 [employer] compression shirt    Secondary lymphedema       oxyCODONE IR 5 MG tablet    ROXICODONE    40 tablet    Take 1-3 tablets (5-15 mg) by mouth every 4 hours as needed for pain maximum 12 tablet(s) per day    Cellulitis of chest wall       rOPINIRole 0.25 MG tablet    REQUIP    30 tablet    Take 1 tablet (0.25 mg) by mouth At Bedtime    Restless leg syndrome       senna-docusate 8.6-50 MG per tablet    SENOKOT-S;PERICOLACE    60 tablet    Take 1 tablet by mouth 2 times daily as needed for constipation    Malignant melanoma of left upper extremity including shoulder (H)       venlafaxine 150 MG Tb24 24 hr tablet    EFFEXOR-ER    30 each    Take 1 tablet (150 mg) by mouth daily (with breakfast)    Mild major depression (H)       Vitamin D3 3000 UNITS Tabs     30 tablet    Take 3,000 Int'l Units by mouth daily    Vitamin D deficiency       zonisamide 25 MG capsule    Zonegran    90 capsule    Take 1 tablet (25 mg) once daily for 1 week, then 2 tablets once daily for 1 week, then 3 tablets once daily for 1 week, then 4 tablets once daily for 1 week.    Headache disorder       * Notice:  This list has 2 medication(s) that are the same as other medications prescribed for you. Read the directions carefully, " and ask your doctor or other care provider to review them with you.

## 2018-02-06 ENCOUNTER — HOSPITAL ENCOUNTER (OUTPATIENT)
Dept: PHYSICAL THERAPY | Facility: CLINIC | Age: 43
Setting detail: THERAPIES SERIES
End: 2018-02-06
Attending: PHYSICIAN ASSISTANT
Payer: COMMERCIAL

## 2018-02-06 PROCEDURE — 97140 MANUAL THERAPY 1/> REGIONS: CPT | Mod: GP | Performed by: PHYSICAL THERAPIST

## 2018-02-06 PROCEDURE — 40000099 ZZH STATISTIC LYMPHEDEMA VISIT: Performed by: PHYSICAL THERAPIST

## 2018-02-08 ENCOUNTER — HOSPITAL ENCOUNTER (OUTPATIENT)
Dept: PHYSICAL THERAPY | Facility: CLINIC | Age: 43
Setting detail: THERAPIES SERIES
End: 2018-02-08
Attending: PHYSICIAN ASSISTANT
Payer: COMMERCIAL

## 2018-02-08 PROCEDURE — 40000099 ZZH STATISTIC LYMPHEDEMA VISIT: Performed by: REHABILITATION PRACTITIONER

## 2018-02-08 PROCEDURE — 97140 MANUAL THERAPY 1/> REGIONS: CPT | Mod: GP | Performed by: REHABILITATION PRACTITIONER

## 2018-02-13 ENCOUNTER — HOSPITAL ENCOUNTER (OUTPATIENT)
Dept: PHYSICAL THERAPY | Facility: CLINIC | Age: 43
Setting detail: THERAPIES SERIES
End: 2018-02-13
Attending: INTERNAL MEDICINE
Payer: COMMERCIAL

## 2018-02-13 PROCEDURE — 97140 MANUAL THERAPY 1/> REGIONS: CPT | Mod: GP | Performed by: REHABILITATION PRACTITIONER

## 2018-02-13 PROCEDURE — 40000099 ZZH STATISTIC LYMPHEDEMA VISIT: Performed by: REHABILITATION PRACTITIONER

## 2018-02-15 ENCOUNTER — HOSPITAL ENCOUNTER (OUTPATIENT)
Dept: PHYSICAL THERAPY | Facility: CLINIC | Age: 43
Setting detail: THERAPIES SERIES
End: 2018-02-15
Attending: INTERNAL MEDICINE
Payer: COMMERCIAL

## 2018-02-15 ENCOUNTER — INFUSION THERAPY VISIT (OUTPATIENT)
Dept: INFUSION THERAPY | Facility: CLINIC | Age: 43
End: 2018-02-15
Attending: INTERNAL MEDICINE
Payer: COMMERCIAL

## 2018-02-15 ENCOUNTER — DOCUMENTATION ONLY (OUTPATIENT)
Dept: ONCOLOGY | Facility: CLINIC | Age: 43
End: 2018-02-15

## 2018-02-15 ENCOUNTER — ONCOLOGY VISIT (OUTPATIENT)
Dept: ONCOLOGY | Facility: CLINIC | Age: 43
End: 2018-02-15
Attending: INTERNAL MEDICINE
Payer: COMMERCIAL

## 2018-02-15 ENCOUNTER — HOSPITAL ENCOUNTER (OUTPATIENT)
Dept: LAB | Facility: CLINIC | Age: 43
Discharge: HOME | End: 2018-02-15
Attending: INTERNAL MEDICINE | Admitting: INTERNAL MEDICINE
Payer: COMMERCIAL

## 2018-02-15 VITALS
DIASTOLIC BLOOD PRESSURE: 70 MMHG | HEART RATE: 75 BPM | WEIGHT: 216.1 LBS | HEIGHT: 63 IN | OXYGEN SATURATION: 96 % | RESPIRATION RATE: 16 BRPM | TEMPERATURE: 99.1 F | BODY MASS INDEX: 38.29 KG/M2 | SYSTOLIC BLOOD PRESSURE: 115 MMHG

## 2018-02-15 DIAGNOSIS — C43.62 MALIGNANT MELANOMA OF LEFT UPPER EXTREMITY INCLUDING SHOULDER (H): Primary | ICD-10-CM

## 2018-02-15 DIAGNOSIS — F32.0 MILD MAJOR DEPRESSION (H): ICD-10-CM

## 2018-02-15 DIAGNOSIS — C43.9 METASTATIC MALIGNANT MELANOMA (H): Primary | ICD-10-CM

## 2018-02-15 DIAGNOSIS — K21.9 GASTROESOPHAGEAL REFLUX DISEASE WITHOUT ESOPHAGITIS: ICD-10-CM

## 2018-02-15 LAB
ALBUMIN SERPL-MCNC: 3.9 G/DL (ref 3.4–5)
ALP SERPL-CCNC: 69 U/L (ref 40–150)
ALT SERPL W P-5'-P-CCNC: 25 U/L (ref 0–50)
ANION GAP SERPL CALCULATED.3IONS-SCNC: 8 MMOL/L (ref 3–14)
AST SERPL W P-5'-P-CCNC: 14 U/L (ref 0–45)
BILIRUB SERPL-MCNC: 0.2 MG/DL (ref 0.2–1.3)
BUN SERPL-MCNC: 13 MG/DL (ref 7–30)
CALCIUM SERPL-MCNC: 9 MG/DL (ref 8.5–10.1)
CHLORIDE SERPL-SCNC: 103 MMOL/L (ref 94–109)
CO2 SERPL-SCNC: 24 MMOL/L (ref 20–32)
CREAT SERPL-MCNC: 0.72 MG/DL (ref 0.52–1.04)
GFR SERPL CREATININE-BSD FRML MDRD: 90 ML/MIN/1.7M2
GLUCOSE SERPL-MCNC: 127 MG/DL (ref 70–99)
POTASSIUM SERPL-SCNC: 3.9 MMOL/L (ref 3.4–5.3)
PROT SERPL-MCNC: 8 G/DL (ref 6.8–8.8)
SODIUM SERPL-SCNC: 135 MMOL/L (ref 133–144)
TSH SERPL DL<=0.005 MIU/L-ACNC: 1.31 MU/L (ref 0.4–4)

## 2018-02-15 PROCEDURE — 84443 ASSAY THYROID STIM HORMONE: CPT | Performed by: INTERNAL MEDICINE

## 2018-02-15 PROCEDURE — 40000099 ZZH STATISTIC LYMPHEDEMA VISIT: Performed by: REHABILITATION PRACTITIONER

## 2018-02-15 PROCEDURE — 97140 MANUAL THERAPY 1/> REGIONS: CPT | Mod: GP | Performed by: REHABILITATION PRACTITIONER

## 2018-02-15 PROCEDURE — 99214 OFFICE O/P EST MOD 30 MIN: CPT | Performed by: INTERNAL MEDICINE

## 2018-02-15 PROCEDURE — 96413 CHEMO IV INFUSION 1 HR: CPT

## 2018-02-15 PROCEDURE — 80053 COMPREHEN METABOLIC PANEL: CPT | Performed by: INTERNAL MEDICINE

## 2018-02-15 PROCEDURE — 36415 COLL VENOUS BLD VENIPUNCTURE: CPT | Performed by: INTERNAL MEDICINE

## 2018-02-15 PROCEDURE — G0463 HOSPITAL OUTPT CLINIC VISIT: HCPCS | Mod: 25

## 2018-02-15 PROCEDURE — 25000128 H RX IP 250 OP 636: Performed by: INTERNAL MEDICINE

## 2018-02-15 RX ORDER — DIPHENHYDRAMINE HYDROCHLORIDE 50 MG/ML
50 INJECTION INTRAMUSCULAR; INTRAVENOUS
Status: CANCELLED
Start: 2018-03-01

## 2018-02-15 RX ORDER — EPINEPHRINE 1 MG/ML
0.3 INJECTION, SOLUTION, CONCENTRATE INTRAVENOUS EVERY 5 MIN PRN
Status: CANCELLED | OUTPATIENT
Start: 2018-02-15

## 2018-02-15 RX ORDER — EPINEPHRINE 0.3 MG/.3ML
0.3 INJECTION SUBCUTANEOUS EVERY 5 MIN PRN
Status: CANCELLED | OUTPATIENT
Start: 2018-03-01

## 2018-02-15 RX ORDER — LORAZEPAM 2 MG/ML
0.5 INJECTION INTRAMUSCULAR EVERY 4 HOURS PRN
Status: CANCELLED
Start: 2018-02-15

## 2018-02-15 RX ORDER — METHYLPREDNISOLONE SODIUM SUCCINATE 125 MG/2ML
125 INJECTION, POWDER, LYOPHILIZED, FOR SOLUTION INTRAMUSCULAR; INTRAVENOUS
Status: CANCELLED
Start: 2018-03-01

## 2018-02-15 RX ORDER — METHYLPREDNISOLONE SODIUM SUCCINATE 125 MG/2ML
125 INJECTION, POWDER, LYOPHILIZED, FOR SOLUTION INTRAMUSCULAR; INTRAVENOUS
Status: CANCELLED
Start: 2018-02-15

## 2018-02-15 RX ORDER — SODIUM CHLORIDE 9 MG/ML
1000 INJECTION, SOLUTION INTRAVENOUS CONTINUOUS PRN
Status: CANCELLED
Start: 2018-03-01

## 2018-02-15 RX ORDER — SODIUM CHLORIDE 9 MG/ML
1000 INJECTION, SOLUTION INTRAVENOUS CONTINUOUS PRN
Status: CANCELLED
Start: 2018-02-15

## 2018-02-15 RX ORDER — MEPERIDINE HYDROCHLORIDE 25 MG/ML
25 INJECTION INTRAMUSCULAR; INTRAVENOUS; SUBCUTANEOUS EVERY 30 MIN PRN
Status: CANCELLED | OUTPATIENT
Start: 2018-03-01

## 2018-02-15 RX ORDER — EPINEPHRINE 0.3 MG/.3ML
0.3 INJECTION SUBCUTANEOUS EVERY 5 MIN PRN
Status: CANCELLED | OUTPATIENT
Start: 2018-02-15

## 2018-02-15 RX ORDER — DIPHENHYDRAMINE HYDROCHLORIDE 50 MG/ML
50 INJECTION INTRAMUSCULAR; INTRAVENOUS
Status: CANCELLED
Start: 2018-02-15

## 2018-02-15 RX ORDER — EPINEPHRINE 1 MG/ML
0.3 INJECTION, SOLUTION, CONCENTRATE INTRAVENOUS EVERY 5 MIN PRN
Status: CANCELLED | OUTPATIENT
Start: 2018-03-01

## 2018-02-15 RX ORDER — ALBUTEROL SULFATE 90 UG/1
1-2 AEROSOL, METERED RESPIRATORY (INHALATION)
Status: CANCELLED
Start: 2018-02-15

## 2018-02-15 RX ORDER — ALBUTEROL SULFATE 0.83 MG/ML
2.5 SOLUTION RESPIRATORY (INHALATION)
Status: CANCELLED | OUTPATIENT
Start: 2018-02-15

## 2018-02-15 RX ORDER — ALBUTEROL SULFATE 90 UG/1
1-2 AEROSOL, METERED RESPIRATORY (INHALATION)
Status: CANCELLED
Start: 2018-03-01

## 2018-02-15 RX ORDER — LORAZEPAM 2 MG/ML
0.5 INJECTION INTRAMUSCULAR EVERY 4 HOURS PRN
Status: CANCELLED
Start: 2018-03-01

## 2018-02-15 RX ORDER — ALBUTEROL SULFATE 0.83 MG/ML
2.5 SOLUTION RESPIRATORY (INHALATION)
Status: CANCELLED | OUTPATIENT
Start: 2018-03-01

## 2018-02-15 RX ORDER — MEPERIDINE HYDROCHLORIDE 25 MG/ML
25 INJECTION INTRAMUSCULAR; INTRAVENOUS; SUBCUTANEOUS EVERY 30 MIN PRN
Status: CANCELLED | OUTPATIENT
Start: 2018-02-15

## 2018-02-15 RX ADMIN — SODIUM CHLORIDE 280 MG: 900 INJECTION, SOLUTION INTRAVENOUS at 12:48

## 2018-02-15 RX ADMIN — SODIUM CHLORIDE 250 ML: 9 INJECTION, SOLUTION INTRAVENOUS at 12:48

## 2018-02-15 ASSESSMENT — PAIN SCALES - GENERAL: PAINLEVEL: MODERATE PAIN (5)

## 2018-02-15 NOTE — NURSING NOTE
"Oncology Rooming Note    February 15, 2018 11:09 AM   Doretha Fernandez is a 42 year old female who presents for:    Chief Complaint   Patient presents with     Oncology Clinic Visit     6 week recheck Metastatic malignant melanoma, Labs & Chemo today     Initial Vitals: /70 (BP Location: Right arm, Patient Position: Sitting, Cuff Size: Adult Large)  Pulse 75  Temp 99.1  F (37.3  C) (Tympanic)  Resp 16  Ht 1.6 m (5' 2.99\")  Wt 98 kg (216 lb 1.6 oz)  SpO2 96%  Breastfeeding? No  BMI 38.29 kg/m2 Estimated body mass index is 38.29 kg/(m^2) as calculated from the following:    Height as of this encounter: 1.6 m (5' 2.99\").    Weight as of this encounter: 98 kg (216 lb 1.6 oz). Body surface area is 2.09 meters squared.  Moderate Pain (5) Comment: shoulder    No LMP recorded.  Allergies reviewed: Yes  Medications reviewed: Yes    Medications: Medication refills not needed today.  Pharmacy name entered into [facility]: [place], [place] - [address]    Clinical concerns: 6 week recheck Metastatic malignant melanoma, Labs & Chemo today.    1. Patient requesting Effexor refill today. ( Primary )         7  minutes for nursing intake (face to face time)     Lesa Patel CMA              "

## 2018-02-15 NOTE — PROGRESS NOTES
Infusion Nursing Note:  Doretha Fernandez presents today for Opdivo.    Patient seen by provider today: Yes: Dr. Richards   present during visit today: Not Applicable.    Note: N/A.    Intravenous Access:  Implanted Port.    Treatment Conditions:  Results reviewed, labs MET treatment parameters, ok to proceed with treatment.      Post Infusion Assessment:  Patient tolerated infusion without incident.    Discharge Plan:   Patient discharged in stable condition accompanied by: self. Returns in 2wks.    Branden Connelly RN

## 2018-02-15 NOTE — LETTER
2/15/2018         RE: Doretha Fernandez  [address] [facility] [address]  [place] [address]        Dear Colleague,    Thank you for referring your patient, Doretha Fernandez, to the [facility]. Please see a copy of my visit note below.    Hematology/ Oncology Follow-up Visit:  Feb 15, 2018    Reason for Visit:   Chief Complaint   Patient presents with     Oncology Clinic Visit     6 week recheck Metastatic malignant melanoma, Labs & Chemo today           Interval History:  Patient is here today to receive immunotherapy with Opdivo.  She has been tolerating treatment well without significant side effects.  She denies any nausea or vomiting or skin rash.  She denies any shortness of breath or cough or wheezing.    Review Of Systems:  Constitutional: Negative for fever, chills, and night sweats.  Skin: negative.  Eyes: negative.  Ears/Nose/Throat: negative.  Respiratory: No shortness of breath, dyspnea on exertion, cough, or hemoptysis.  Cardiovascular: negative.  Gastrointestinal: negative.  Genitourinary: negative.  Musculoskeletal: negative.  Neurologic: negative.  Psychiatric: negative.  Hematologic/Lymphatic/Immunologic: negative.  Endocrine: negative.    All other ROS negative unless mentioned in interval history.    Past medical, social, surgical, and family histories reviewed.    Allergies:  Allergies as of 02/15/2018 - Nicola as Reviewed 02/15/2018   Allergen Reaction Noted     Compazine [prochlorperazine] Fatigue 10/12/2017     Fentanyl Other (See Comments) 10/12/2017     Erythrocin Nausea and Vomiting 03/20/2008     Zithromax [azithromycin dihydrate] Diarrhea 02/17/2012       Current Medications:  Current Outpatient Prescriptions   Medication Sig Dispense Refill     aspirin 81 MG EC tablet Take 1 tablet (81 mg) by mouth daily 90 tablet 3     venlafaxine (EFFEXOR-ER) 150 MG TB24 24 hr tablet Take 1 tablet (150 mg) by mouth daily (with breakfast) 30 each 0     order for DME Equipment being ordered: x2 Wearease  "compression shirt 1 each 0     order for DME Equipment being ordered: 20-30mmHg compression sleeve and 20-30mmHg compression glove\" x2 pair 1 Units 0     rOPINIRole (REQUIP) 0.25 MG tablet Take 1 tablet (0.25 mg) by mouth At Bedtime 30 tablet 11     oxyCODONE IR (ROXICODONE) 5 MG tablet Take 1-3 tablets (5-15 mg) by mouth every 4 hours as needed for pain maximum 12 tablet(s) per day 40 tablet 0     acetaminophen (TYLENOL) 325 MG tablet Take 2 tablets (650 mg) by mouth every 4 hours as needed for other (mild pain) 100 tablet 0     senna-docusate (SENOKOT-S;PERICOLACE) 8.6-50 MG per tablet Take 1 tablet by mouth 2 times daily as needed for constipation 60 tablet 1     LORazepam (ATIVAN) 1 MG tablet Take 1 tablet (1 mg) by mouth every 8 hours as needed for anxiety 30 tablet 0     Cholecalciferol (VITAMIN D3) 3000 UNITS TABS Take 3,000 Int'l Units by mouth daily 30 tablet 3     zonisamide (ZONEGRAN) 25 MG capsule Take 1 tablet (25 mg) once daily for 1 week, then 2 tablets once daily for 1 week, then 3 tablets once daily for 1 week, then 4 tablets once daily for 1 week. 90 capsule 1        Physical Exam:  /70 (BP Location: Right arm, Patient Position: Sitting, Cuff Size: Adult Large)  Pulse 75  Temp 99.1  F (37.3  C) (Tympanic)  Resp 16  Ht 1.6 m (5' 2.99\")  Wt 98 kg (216 lb 1.6 oz)  SpO2 96%  Breastfeeding? No  BMI 38.29 kg/m2  Wt Readings from Last 12 Encounters:   02/15/18 98 kg (216 lb 1.6 oz)   01/18/18 97.7 kg (215 lb 6.4 oz)   12/07/17 95.5 kg (210 lb 9.6 oz)   11/24/17 94.3 kg (207 lb 12.8 oz)   11/22/17 93.9 kg (207 lb)   11/14/17 95.9 kg (211 lb 6.7 oz)   11/09/17 94.8 kg (208 lb 14.4 oz)   11/02/17 96.1 kg (211 lb 12.8 oz)   11/02/17 95.8 kg (211 lb 4.8 oz)   10/27/17 95.8 kg (211 lb 1.6 oz)   10/23/17 94.5 kg (208 lb 5.4 oz)   10/18/17 94.7 kg (208 lb 12.8 oz)     ECOG performance status: 1  GENERAL APPEARANCE: Healthy, alert and in no acute distress.  HEENT: Sclerae anicteric. PERRLA. " Oropharynx without ulcers, lesions, or thrush.  NECK: Supple. No asymmetry or masses.  LYMPHATICS: No palpable cervical, supraclavicular, axillary, or inguinal lymphadenopathy.  RESP: Lungs clear to auscultation bilaterally without rales, rhonchi or wheezes.  CARDIOVASCULAR: Regular rate and rhythm. Normal S1, S2; no S3 or S4. No murmur, gallop, or rub.  ABDOMEN: Soft, nontender. Bowel sounds normal. No palpable organomegaly or masses.  MUSCULOSKELETAL: Extremities without gross deformities noted. No edema of bilateral lower extremities.  SKIN: No suspicious lesions or rashes.  NEURO: Alert and oriented x 3. Cranial nerves II-XII grossly intact.  PSYCHIATRIC: Mentation and affect appear normal.    Laboratory/Imaging Studies:  No visits with results within 2 Week(s) from this visit.  Latest known visit with results is:    Infusion Therapy Visit on 01/18/2018   Component Date Value Ref Range Status     Sodium 01/18/2018 139  133 - 144 mmol/L Final     Potassium 01/18/2018 3.9  3.4 - 5.3 mmol/L Final     Chloride 01/18/2018 107  94 - 109 mmol/L Final     Carbon Dioxide 01/18/2018 23  20 - 32 mmol/L Final     Anion Gap 01/18/2018 9  3 - 14 mmol/L Final     Glucose 01/18/2018 134* 70 - 99 mg/dL Final     Urea Nitrogen 01/18/2018 14  7 - 30 mg/dL Final     Creatinine 01/18/2018 0.58  0.52 - 1.04 mg/dL Final     GFR Estimate 01/18/2018 >90  >60 mL/min/1.7m2 Final    Non  GFR Calc     GFR Estimate If Black 01/18/2018 >90  >60 mL/min/1.7m2 Final    African American GFR Calc     Calcium 01/18/2018 8.8  8.5 - 10.1 mg/dL Final     Bilirubin Total 01/18/2018 0.2  0.2 - 1.3 mg/dL Final     Albumin 01/18/2018 3.6  3.4 - 5.0 g/dL Final     Protein Total 01/18/2018 7.7  6.8 - 8.8 g/dL Final     Alkaline Phosphatase 01/18/2018 68  40 - 150 U/L Final     ALT 01/18/2018 34  0 - 50 U/L Final     AST 01/18/2018 15  0 - 45 U/L Final     TSH 01/18/2018 1.24  0.40 - 4.00 mU/L Final          Assessment and plan:    (C79.9)  Metastatic malignant melanoma (H)  (primary encounter diagnosis)  I would recommend patient to proceed with immunotherapy with Opdivo.  I would like the patient to be evaluated by radiation oncology to evaluate the benefit of pulsed excision radiation therapy.  I will see the patient again in 1 month or sooner if there are new developments or concerns.    Plan: RAD ONCOLOGY REFERRAL    (F32.0) Mild major depression (H)  Patient will continue on Effexor  mg daily.      The patient is ready to learn, no apparent learning barriers were identified.  Diagnosis and treatment plans were explained to the patient. The patient expressed understanding of the content. The patient asked appropriate questions. The patient questions were answered to her satisfaction.    Chart documentation with Dragon Voice recognition Software. Although reviewed after completion, some words and grammatical errors may remain.    Again, thank you for allowing me to participate in the care of your patient.        Sincerely,        Kathy Richards MD

## 2018-02-15 NOTE — MR AVS SNAPSHOT
After Visit Summary   2/15/2018    Doretha Fernandez    MRN: [id number]           Patient Information     Date Of Birth          1975        Visit Information        Provider Department      2/15/2018 11:00 AM Kathy Richards MD [facility] ONCOLOGY      Care Instructions    We would like you to have Opdivo today as planned to see you back in 4 weeks.  When you are in need of a refill, please call your pharmacy and they will send us a request.  Copy of appointments, and after visit summary (AVS) given to patient.  If you have any questions during business hours (M-F 8 AM- 4PM), please call Jeanie Keenan, RN, BSN, OCN Oncology Hematology /Breast Cancer Navigator at [facility] [phone].   For questions after business hours, or on holidays/weekends, please call our after hours Nurse Triage line [phone]. Thank you.            Follow-ups after your visit        Follow-up notes from your care team     Return in about 4 weeks (around 3/15/2018).      Your next 10 appointments already scheduled     Feb 20, 2018  3:00 PM CST   Lymphedema Treatment with Anastasiya Rodriguez PTA   [facility] Lymphedema ([facility])    [address] [facility] [address]   [phone]            Feb 22, 2018  3:00 PM CST   Lymphedema Treatment with Anastasiya Rodriguez PTA   [facility] Lymphedema ([facility])    [address] [facility] [address]   [phone]            Feb 27, 2018  3:00 PM CST   Lymphedema Treatment with Anastasiya Rodriguez PTA   [facility] Lymphedema ([facility])    [address] [facility] [address]   [phone]            Mar 01, 2018 10:00 AM CST   Level 2 with ROOM 2 [facility] Cancer Infusion ([facility])    [facility]  [address]  [facility] [address]   [phone]            Mar 01, 2018  3:00  PM CST   Lymphedema Treatment with Anastasiya Rodriguez PTA   [facility] Lymphedema ([facility])    [address] [facility] [address]   [phone]            Mar 15, 2018  8:30 AM CDT   LAB with [facility] ([facility])    [address] [facility] [address]   [phone]           Please do not eat 10-12 hours before your appointment if you are coming in fasting for labs on lipids, cholesterol, or glucose (sugar). This does not apply to pregnant women. Water, hot tea and black coffee (with nothing added) are okay. Do not drink other fluids, diet soda or chew gum.            Mar 15, 2018  9:00 AM CDT   Return Visit with Kathy Richards MD   [facility] ([facility])    [facility]  [address]  [facility] [address]   [phone]            Mar 15, 2018  9:30 AM CDT   Level 2 with ROOM 1 [facility] Infusion ([facility])    [facility]  [address]  [facility] [address]   [phone]            Mar 29, 2018  8:30 AM CDT   Level 2 with ROOM 3 [facility] Infusion ([facility])    [facility]  [address]  [facility] [address]   [phone]            Apr 09, 2018  2:30 PM CDT   [facility] Visit with Lowell Bullock MD   [facility]    [address] [facility] [address]   [phone]              Who to contact     If you have questions or need follow up information about today's clinic visit or your schedule please contact [facility] CANCER [facility] directly at [phone].  Normal or non-critical lab and imaging results will be communicated to you by MyChart, letter or phone within 4 business days after the clinic has received the results. If you do not hear from us within 7 days, please contact the clinic  "through One[employer] or phone. If you have a critical or abnormal lab result, we will notify you by phone as soon as possible.  Submit refill requests through [employer] or call your pharmacy and they will forward the refill request to us. Please allow 3 business days for your refill to be completed.          Additional Information About Your Visit        Oneflare[employer] Information     [employer] gives you secure access to your electronic health record. If you see a primary care provider, you can also send messages to your care team and make appointments. If you have questions, please call your primary care clinic.  If you do not have a primary care provider, please call [phone] and they will assist you.        Care EveryWhere ID     This is your Care EveryWhere ID. This could be used by other organizations to access your [place] medical records  [id number]        Your Vitals Were     Pulse Temperature Respirations Height Pulse Oximetry Breastfeeding?    75 99.1  F (37.3  C) (Tympanic) 16 1.6 m (5' 2.99\") 96% No    BMI (Body Mass Index)                   38.29 kg/m2            Blood Pressure from Last 3 Encounters:   02/15/18 115/70   02/01/18 141/78   01/18/18 111/66    Weight from Last 3 Encounters:   02/15/18 98 kg (216 lb 1.6 oz)   01/18/18 97.7 kg (215 lb 6.4 oz)   12/07/17 95.5 kg (210 lb 9.6 oz)              Today, you had the following     No orders found for display       Primary Care Provider Office Phone # Fax #    Anna Naidu [phone] [address] [facility] [address]        Equal Access to Services     [facility]: Hadii aad ku hadasho Soomaali, waaxda luqadaha, qaybta kaalmada pancho perez. So [facility] [phone].    ATENCIÓN: Si habla español, tiene a mcdonnell disposición servicios gratuitos de asistencia lingüística. Llame al [phone].    We comply with applicable federal civil rights laws and [place] laws. We do not discriminate on " "the basis of race, color, national origin, age, disability, sex, sexual orientation, or gender identity.            Thank you!     Thank you for choosing [facility]INIC  for your care. Our goal is always to provide you with excellent care. Hearing back from our patients is one way we can continue to improve our services. Please take a few minutes to complete the written survey that you may receive in the mail after your visit with us. Thank you!             Your Updated Medication List - Protect others around you: Learn how to safely use, store and throw away your medicines at www.disposemymeds.org.          This list is accurate as of 2/15/18 12:13 PM.  Always use your most recent med list.                   Brand Name Dispense Instructions for use Diagnosis    acetaminophen 325 MG tablet    TYLENOL    100 tablet    Take 2 tablets (650 mg) by mouth every 4 hours as needed for other (mild pain)    Malignant melanoma of left upper extremity including shoulder (H)       aspirin 81 MG EC tablet     90 tablet    Take 1 tablet (81 mg) by mouth daily    Prothrombin mutation (H)       LORazepam 1 MG tablet    ATIVAN    30 tablet    Take 1 tablet (1 mg) by mouth every 8 hours as needed for anxiety    Anxiety, Acute intractable tension-type headache       * order for DME     1 Units    Equipment being ordered: 20-30mmHg compression sleeve and 20-30mmHg compression glove\" x2 pair    Lymphedema of left arm       * order for DME     1 each    Equipment being ordered: x2 Wearease compression shirt    Secondary lymphedema       oxyCODONE IR 5 MG tablet    ROXICODONE    40 tablet    Take 1-3 tablets (5-15 mg) by mouth every 4 hours as needed for pain maximum 12 tablet(s) per day    Cellulitis of chest wall       rOPINIRole 0.25 MG tablet    REQUIP    30 tablet    Take 1 tablet (0.25 mg) by mouth At Bedtime    Restless leg syndrome       senna-docusate 8.6-50 MG per tablet    SENOKOT-S;PERICOLACE    60 tablet    Take 1 tablet " by mouth 2 times daily as needed for constipation    Malignant melanoma of left upper extremity including shoulder (H)       venlafaxine 150 MG Tb24 24 hr tablet    EFFEXOR-ER    30 each    Take 1 tablet (150 mg) by mouth daily (with breakfast)    Mild major depression (H)       Vitamin D3 3000 UNITS Tabs     30 tablet    Take 3,000 Int'l Units by mouth daily    Vitamin D deficiency       zonisamide 25 MG capsule    Zonegran    90 capsule    Take 1 tablet (25 mg) once daily for 1 week, then 2 tablets once daily for 1 week, then 3 tablets once daily for 1 week, then 4 tablets once daily for 1 week.    Headache disorder       * Notice:  This list has 2 medication(s) that are the same as other medications prescribed for you. Read the directions carefully, and ask your doctor or other care provider to review them with you.

## 2018-02-15 NOTE — PATIENT INSTRUCTIONS
We would like you to have Opdivo today as planned, refer you to Radiation/oncology and to see you back in 4 weeks.  When you are in need of a refill, please call your pharmacy and they will send us a request.  Copy of appointments, and after visit summary (AVS) given to patient.  If you have any questions during business hours (M-F 8 AM- 4PM), please call Jeanie Keenan RN, BSN, OCN Oncology Hematology /Breast Cancer Navigator at [facility] [phone].   For questions after business hours, or on holidays/weekends, please call our after hours Nurse Triage line [phone]. Thank you.

## 2018-02-15 NOTE — MR AVS SNAPSHOT
After Visit Summary   2/15/2018    Doretha Fernandez    MRN: [id number]           Patient Information     Date Of Birth          1975        Visit Information        Provider Department      2/15/2018 11:30 AM ROOM 3 [facility]        Today's Diagnoses     Malignant melanoma of left upper extremity including shoulder (H)    -  1       Follow-ups after your visit        Your next 10 appointments already scheduled     Feb 20, 2018  3:00 PM CST   Lymphedema Treatment with Anastasiya Rodriguez PTA   [facility] ([facility])    [address] [address]   [phone]            Feb 22, 2018  3:00 PM CST   Lymphedema Treatment with Anastasiya Rodriguez PTA   [facility] ([facility])    [address] [address]   [phone]            Feb 27, 2018  3:00 PM CST   Lymphedema Treatment with Anastasiya Rodriguez PTA   [facility] ([facility])    [address] [address]   [phone]            Mar 01, 2018 10:00 AM CST   Level 2 with ROOM 2 [facility] ([facility])    [facility]  [address]  [facility] [address]   [phone]            Mar 01, 2018  3:00 PM CST   Lymphedema Treatment with Anastasiya Rodriguez PTA   [facility] ([facility])    [address] [address]   [phone]            Mar 15, 2018  8:30 AM CDT   LAB with [facility] ([facility])    [address] [address]   [phone]           Please do not eat 10-12 hours before your appointment if you are coming in fasting for labs on lipids, cholesterol, or glucose (sugar). This does not apply to pregnant women. Water, hot tea and black coffee (with nothing added) are okay. Do not drink other fluids, diet  soda or chew gum.            Mar 15, 2018  9:00 AM CDT   Return Visit with Kathy Richards MD   [facility] ([facility])    [facility]  [address]  [facility] [address]   [phone]            Mar 15, 2018  9:30 AM CDT   Level 2 with ROOM 1 [facility] ([facility])    [facility]  [address]  [facility] [address]   [phone]            Mar 29, 2018  8:30 AM CDT   Level 2 with ROOM 3 [facility] ([facility])    [facility]  [address]  [facility] [address]   [phone]            Apr 09, 2018  2:30 PM CDT   Return Visit with Lowell Bullock MD   [facility] ([facility])    [address] [facility] [address]   [phone]              Who to contact     If you have questions or need follow up information about today's clinic visit or your schedule please contact [facility] directly at [phone].  Normal or non-critical lab and imaging results will be communicated to you by Kanchufanghart, letter or phone within 4 business days after the clinic has received the results. If you do not hear from us within 7 days, please contact the clinic through Kanchufanghart or phone. If you have a critical or abnormal lab result, we will notify you by phone as soon as possible.  Submit refill requests through opentabs or call your pharmacy and they will forward the refill request to us. Please allow 3 business days for your refill to be completed.          Additional Information About Your Visit        opentabs Information     opentabs gives you secure access to your electronic health record. If you see a primary care provider, you can also send messages to your care team and make appointments. If you have questions, please call your primary care clinic.  If you do not have a primary care  provider, please call [phone] and they will assist you.        Care EveryWhere ID     This is your Care EveryWhere ID. This could be used by other organizations to access your [place] medical records  [id number]         Blood Pressure from Last 3 Encounters:   02/15/18 115/70   02/01/18 141/78   01/18/18 111/66    Weight from Last 3 Encounters:   02/15/18 98 kg (216 lb 1.6 oz)   01/18/18 97.7 kg (215 lb 6.4 oz)   12/07/17 95.5 kg (210 lb 9.6 oz)              We Performed the Following     Comprehensive metabolic panel     TSH with free T4 reflex        Primary Care Provider Office Phone # Fax #    Anna Naidu [phone] [address] [facility] [address]        Equal Access to Services     DISHA QUARLES : Hadii josy garnett hadasho Soalphonso, waaxda luqadaha, qaybta kaalmada chris, pancho moore . So [facility] [phone].    ATENCIÓN: Si habla español, tiene a mcdonnell disposición servicios gratuitos de asistencia lingüística. Diana al [phone].    We comply with applicable federal civil rights laws and [place] laws. We do not discriminate on the basis of race, color, national origin, age, disability, sex, sexual orientation, or gender identity.            Thank you!     Thank you for choosing [facility]  for your care. Our goal is always to provide you with excellent care. Hearing back from our patients is one way we can continue to improve our services. Please take a few minutes to complete the written survey that you may receive in the mail after your visit with us. Thank you!             Your Updated Medication List - Protect others around you: Learn how to safely use, store and throw away your medicines at www.disposemymeds.org.          This list is accurate as of 2/15/18  2:28 PM.  Always use your most recent med list.                   Brand Name Dispense Instructions for use Diagnosis    acetaminophen 325 MG tablet    TYLENOL    100  "tablet    Take 2 tablets (650 mg) by mouth every 4 hours as needed for other (mild pain)    Malignant melanoma of left upper extremity including shoulder (H)       aspirin 81 MG EC tablet     90 tablet    Take 1 tablet (81 mg) by mouth daily    Prothrombin mutation (H)       LORazepam 1 MG tablet    ATIVAN    30 tablet    Take 1 tablet (1 mg) by mouth every 8 hours as needed for anxiety    Anxiety, Acute intractable tension-type headache       * order for DME     1 Units    Equipment being ordered: 20-30mmHg compression sleeve and 20-30mmHg compression glove\" x2 pair    Lymphedema of left arm       * order for DME     1 each    Equipment being ordered: x2 Wearease compression shirt    Secondary lymphedema       oxyCODONE IR 5 MG tablet    ROXICODONE    40 tablet    Take 1-3 tablets (5-15 mg) by mouth every 4 hours as needed for pain maximum 12 tablet(s) per day    Cellulitis of chest wall       rOPINIRole 0.25 MG tablet    REQUIP    30 tablet    Take 1 tablet (0.25 mg) by mouth At Bedtime    Restless leg syndrome       senna-docusate 8.6-50 MG per tablet    SENOKOT-S;PERICOLACE    60 tablet    Take 1 tablet by mouth 2 times daily as needed for constipation    Malignant melanoma of left upper extremity including shoulder (H)       venlafaxine 150 MG Tb24 24 hr tablet    EFFEXOR-ER    30 each    Take 1 tablet (150 mg) by mouth daily (with breakfast)    Mild major depression (H)       Vitamin D3 3000 UNITS Tabs     30 tablet    Take 3,000 Int'l Units by mouth daily    Vitamin D deficiency       zonisamide 25 MG capsule    Zonegran    90 capsule    Take 1 tablet (25 mg) once daily for 1 week, then 2 tablets once daily for 1 week, then 3 tablets once daily for 1 week, then 4 tablets once daily for 1 week.    Headache disorder       * Notice:  This list has 2 medication(s) that are the same as other medications prescribed for you. Read the directions carefully, and ask your doctor or other care provider to review them " with you.

## 2018-02-15 NOTE — PROGRESS NOTES
Pt dropped off paperwork during appt and needed returned immediately. Filled out without any changes at this time. Copy sent to scanning and original given back to pt.

## 2018-02-16 NOTE — PROGRESS NOTES
"Hematology/ Oncology Follow-up Visit:  Feb 15, 2018    Reason for Visit:   Chief Complaint   Patient presents with     Oncology Clinic Visit     6 week recheck Metastatic malignant melanoma, Labs & Chemo today           Interval History:  Patient is here today to receive immunotherapy with Opdivo.  She has been tolerating treatment well without significant side effects.  She denies any nausea or vomiting or skin rash.  She denies any shortness of breath or cough or wheezing.    Review Of Systems:  Constitutional: Negative for fever, chills, and night sweats.  Skin: negative.  Eyes: negative.  Ears/Nose/Throat: negative.  Respiratory: No shortness of breath, dyspnea on exertion, cough, or hemoptysis.  Cardiovascular: negative.  Gastrointestinal: negative.  Genitourinary: negative.  Musculoskeletal: negative.  Neurologic: negative.  Psychiatric: negative.  Hematologic/Lymphatic/Immunologic: negative.  Endocrine: negative.    All other ROS negative unless mentioned in interval history.    Past medical, social, surgical, and family histories reviewed.    Allergies:  Allergies as of 02/15/2018 - Nicola as Reviewed 02/15/2018   Allergen Reaction Noted     Compazine [prochlorperazine] Fatigue 10/12/2017     Fentanyl Other (See Comments) 10/12/2017     Erythrocin Nausea and Vomiting 03/20/2008     Zithromax [azithromycin dihydrate] Diarrhea 02/17/2012       Current Medications:  Current Outpatient Prescriptions   Medication Sig Dispense Refill     aspirin 81 MG EC tablet Take 1 tablet (81 mg) by mouth daily 90 tablet 3     venlafaxine (EFFEXOR-ER) 150 MG TB24 24 hr tablet Take 1 tablet (150 mg) by mouth daily (with breakfast) 30 each 0     order for DME Equipment being ordered: x2 Wearease compression shirt 1 each 0     order for DME Equipment being ordered: 20-30mmHg compression sleeve and 20-30mmHg compression glove\" x2 pair 1 Units 0     rOPINIRole (REQUIP) 0.25 MG tablet Take 1 tablet (0.25 mg) by mouth At Bedtime 30 " "tablet 11     oxyCODONE IR (ROXICODONE) 5 MG tablet Take 1-3 tablets (5-15 mg) by mouth every 4 hours as needed for pain maximum 12 tablet(s) per day 40 tablet 0     acetaminophen (TYLENOL) 325 MG tablet Take 2 tablets (650 mg) by mouth every 4 hours as needed for other (mild pain) 100 tablet 0     senna-docusate (SENOKOT-S;PERICOLACE) 8.6-50 MG per tablet Take 1 tablet by mouth 2 times daily as needed for constipation 60 tablet 1     LORazepam (ATIVAN) 1 MG tablet Take 1 tablet (1 mg) by mouth every 8 hours as needed for anxiety 30 tablet 0     Cholecalciferol (VITAMIN D3) 3000 UNITS TABS Take 3,000 Int'l Units by mouth daily 30 tablet 3     zonisamide (ZONEGRAN) 25 MG capsule Take 1 tablet (25 mg) once daily for 1 week, then 2 tablets once daily for 1 week, then 3 tablets once daily for 1 week, then 4 tablets once daily for 1 week. 90 capsule 1        Physical Exam:  /70 (BP Location: Right arm, Patient Position: Sitting, Cuff Size: Adult Large)  Pulse 75  Temp 99.1  F (37.3  C) (Tympanic)  Resp 16  Ht 1.6 m (5' 2.99\")  Wt 98 kg (216 lb 1.6 oz)  SpO2 96%  Breastfeeding? No  BMI 38.29 kg/m2  Wt Readings from Last 12 Encounters:   02/15/18 98 kg (216 lb 1.6 oz)   01/18/18 97.7 kg (215 lb 6.4 oz)   12/07/17 95.5 kg (210 lb 9.6 oz)   11/24/17 94.3 kg (207 lb 12.8 oz)   11/22/17 93.9 kg (207 lb)   11/14/17 95.9 kg (211 lb 6.7 oz)   11/09/17 94.8 kg (208 lb 14.4 oz)   11/02/17 96.1 kg (211 lb 12.8 oz)   11/02/17 95.8 kg (211 lb 4.8 oz)   10/27/17 95.8 kg (211 lb 1.6 oz)   10/23/17 94.5 kg (208 lb 5.4 oz)   10/18/17 94.7 kg (208 lb 12.8 oz)     ECOG performance status: 1  GENERAL APPEARANCE: Healthy, alert and in no acute distress.  HEENT: Sclerae anicteric. PERRLA. Oropharynx without ulcers, lesions, or thrush.  NECK: Supple. No asymmetry or masses.  LYMPHATICS: No palpable cervical, supraclavicular, axillary, or inguinal lymphadenopathy.  RESP: Lungs clear to auscultation bilaterally without rales, " rhonchi or wheezes.  CARDIOVASCULAR: Regular rate and rhythm. Normal S1, S2; no S3 or S4. No murmur, gallop, or rub.  ABDOMEN: Soft, nontender. Bowel sounds normal. No palpable organomegaly or masses.  MUSCULOSKELETAL: Extremities without gross deformities noted. No edema of bilateral lower extremities.  SKIN: No suspicious lesions or rashes.  NEURO: Alert and oriented x 3. Cranial nerves II-XII grossly intact.  PSYCHIATRIC: Mentation and affect appear normal.    Laboratory/Imaging Studies:  No visits with results within 2 Week(s) from this visit.  Latest known visit with results is:    Infusion Therapy Visit on 01/18/2018   Component Date Value Ref Range Status     Sodium 01/18/2018 139  133 - 144 mmol/L Final     Potassium 01/18/2018 3.9  3.4 - 5.3 mmol/L Final     Chloride 01/18/2018 107  94 - 109 mmol/L Final     Carbon Dioxide 01/18/2018 23  20 - 32 mmol/L Final     Anion Gap 01/18/2018 9  3 - 14 mmol/L Final     Glucose 01/18/2018 134* 70 - 99 mg/dL Final     Urea Nitrogen 01/18/2018 14  7 - 30 mg/dL Final     Creatinine 01/18/2018 0.58  0.52 - 1.04 mg/dL Final     GFR Estimate 01/18/2018 >90  >60 mL/min/1.7m2 Final    Non  GFR Calc     GFR Estimate If Black 01/18/2018 >90  >60 mL/min/1.7m2 Final    African American GFR Calc     Calcium 01/18/2018 8.8  8.5 - 10.1 mg/dL Final     Bilirubin Total 01/18/2018 0.2  0.2 - 1.3 mg/dL Final     Albumin 01/18/2018 3.6  3.4 - 5.0 g/dL Final     Protein Total 01/18/2018 7.7  6.8 - 8.8 g/dL Final     Alkaline Phosphatase 01/18/2018 68  40 - 150 U/L Final     ALT 01/18/2018 34  0 - 50 U/L Final     AST 01/18/2018 15  0 - 45 U/L Final     TSH 01/18/2018 1.24  0.40 - 4.00 mU/L Final          Assessment and plan:    (C79.9) Metastatic malignant melanoma (H)  (primary encounter diagnosis)  I would recommend patient to proceed with immunotherapy with Opdivo.  I would like the patient to be evaluated by radiation oncology to evaluate the benefit of pulsed excision  radiation therapy.  I will see the patient again in 1 month or sooner if there are new developments or concerns.    Plan: RAD ONCOLOGY REFERRAL    (F32.0) Mild major depression (H)  Patient will continue on Effexor  mg daily.      The patient is ready to learn, no apparent learning barriers were identified.  Diagnosis and treatment plans were explained to the patient. The patient expressed understanding of the content. The patient asked appropriate questions. The patient questions were answered to her satisfaction.    Chart documentation with Dragon Voice recognition Software. Although reviewed after completion, some words and grammatical errors may remain.

## 2018-02-20 ENCOUNTER — TELEPHONE (OUTPATIENT)
Dept: ONCOLOGY | Facility: CLINIC | Age: 43
End: 2018-02-20

## 2018-02-20 DIAGNOSIS — R19.7 DIARRHEA: ICD-10-CM

## 2018-02-20 DIAGNOSIS — C43.9 METASTATIC MALIGNANT MELANOMA (H): ICD-10-CM

## 2018-02-20 DIAGNOSIS — C43.9 METASTATIC MALIGNANT MELANOMA (H): Primary | ICD-10-CM

## 2018-02-20 PROCEDURE — 87493 C DIFF AMPLIFIED PROBE: CPT | Performed by: INTERNAL MEDICINE

## 2018-02-20 NOTE — TELEPHONE ENCOUNTER
"Patient returned call to clinic.  She was calling to report that she is experiencing intermittent diarrhea and is worried the Opdivo infusions caused colitits or c-diff.  Denies fever nor chills.  Reports the loose stools started either Friday or Saturday and \"sometimes it feels like they're getting worse\".  Unable to quantify # of stools.    Advised patient to be seen by PCP as we do not have a provider here in clinic today.  "

## 2018-02-20 NOTE — TELEPHONE ENCOUNTER
Patient called clinic and left a message requesting a call back. Pt did not state what call was in regards to. Call returned to patient, no answer.  Message left requesting a call back.  Direct line provided.

## 2018-02-20 NOTE — TELEPHONE ENCOUNTER
Patient called clinic back.  She is unable to be seen by PCP until Thursday or Friday this week.     Advised patient to come to clinic today and  stool sample kit.  We will test her for c-diff and call her with Dr. Richards's recommendations.     Patient is unable to come to clinic until 3 PM as she is working until 2:30. Stool kit left at  with instructions for collection and drop off of specimen.

## 2018-02-21 LAB
C DIFF TOX B STL QL: NEGATIVE
SPECIMEN SOURCE: NORMAL

## 2018-02-22 ENCOUNTER — ONCOLOGY VISIT (OUTPATIENT)
Dept: ONCOLOGY | Facility: CLINIC | Age: 43
End: 2018-02-22
Attending: INTERNAL MEDICINE
Payer: COMMERCIAL

## 2018-02-22 ENCOUNTER — HOSPITAL ENCOUNTER (OUTPATIENT)
Dept: PHYSICAL THERAPY | Facility: CLINIC | Age: 43
Setting detail: THERAPIES SERIES
End: 2018-02-22
Attending: INTERNAL MEDICINE
Payer: COMMERCIAL

## 2018-02-22 ENCOUNTER — OFFICE VISIT (OUTPATIENT)
Dept: RADIATION THERAPY | Facility: OUTPATIENT CENTER | Age: 43
End: 2018-02-22
Payer: COMMERCIAL

## 2018-02-22 ENCOUNTER — HOSPITAL ENCOUNTER (OUTPATIENT)
Dept: LAB | Facility: CLINIC | Age: 43
Discharge: HOME | End: 2018-02-22
Attending: INTERNAL MEDICINE | Admitting: INTERNAL MEDICINE
Payer: COMMERCIAL

## 2018-02-22 VITALS
BODY MASS INDEX: 37.39 KG/M2 | RESPIRATION RATE: 14 BRPM | OXYGEN SATURATION: 99 % | DIASTOLIC BLOOD PRESSURE: 76 MMHG | SYSTOLIC BLOOD PRESSURE: 165 MMHG | HEART RATE: 78 BPM | WEIGHT: 211 LBS

## 2018-02-22 VITALS
HEART RATE: 75 BPM | SYSTOLIC BLOOD PRESSURE: 136 MMHG | DIASTOLIC BLOOD PRESSURE: 91 MMHG | HEIGHT: 63 IN | WEIGHT: 215.3 LBS | RESPIRATION RATE: 16 BRPM | OXYGEN SATURATION: 100 % | BODY MASS INDEX: 38.15 KG/M2 | TEMPERATURE: 98.3 F

## 2018-02-22 DIAGNOSIS — C43.62 MALIGNANT MELANOMA OF LEFT UPPER EXTREMITY INCLUDING SHOULDER (H): ICD-10-CM

## 2018-02-22 DIAGNOSIS — R19.7 DIARRHEA: Primary | ICD-10-CM

## 2018-02-22 DIAGNOSIS — Z53.9 ERRONEOUS ENCOUNTER--DISREGARD: Primary | ICD-10-CM

## 2018-02-22 DIAGNOSIS — F32.0 MILD MAJOR DEPRESSION (H): ICD-10-CM

## 2018-02-22 DIAGNOSIS — R19.7 DIARRHEA OF PRESUMED INFECTIOUS ORIGIN: ICD-10-CM

## 2018-02-22 DIAGNOSIS — J45.20 MILD INTERMITTENT ASTHMA WITHOUT COMPLICATION: ICD-10-CM

## 2018-02-22 DIAGNOSIS — R19.7 DIARRHEA OF PRESUMED INFECTIOUS ORIGIN: Primary | ICD-10-CM

## 2018-02-22 DIAGNOSIS — C43.62 MALIGNANT MELANOMA OF LEFT UPPER EXTREMITY INCLUDING SHOULDER (H): Primary | ICD-10-CM

## 2018-02-22 DIAGNOSIS — K59.1 FUNCTIONAL DIARRHEA: ICD-10-CM

## 2018-02-22 DIAGNOSIS — K21.9 GASTROESOPHAGEAL REFLUX DISEASE WITHOUT ESOPHAGITIS: ICD-10-CM

## 2018-02-22 DIAGNOSIS — C43.9 METASTATIC MALIGNANT MELANOMA (H): ICD-10-CM

## 2018-02-22 LAB
ALBUMIN SERPL-MCNC: 3.7 G/DL (ref 3.4–5)
ALP SERPL-CCNC: 72 U/L (ref 40–150)
ALT SERPL W P-5'-P-CCNC: 29 U/L (ref 0–50)
ANION GAP SERPL CALCULATED.3IONS-SCNC: 7 MMOL/L (ref 3–14)
AST SERPL W P-5'-P-CCNC: 20 U/L (ref 0–45)
BASOPHILS # BLD AUTO: 0 10E9/L (ref 0–0.2)
BASOPHILS NFR BLD AUTO: 0.6 %
BILIRUB SERPL-MCNC: 0.2 MG/DL (ref 0.2–1.3)
BUN SERPL-MCNC: 15 MG/DL (ref 7–30)
CALCIUM SERPL-MCNC: 9.1 MG/DL (ref 8.5–10.1)
CHLORIDE SERPL-SCNC: 105 MMOL/L (ref 94–109)
CO2 SERPL-SCNC: 25 MMOL/L (ref 20–32)
CREAT SERPL-MCNC: 0.63 MG/DL (ref 0.52–1.04)
DIFFERENTIAL METHOD BLD: NORMAL
EOSINOPHIL # BLD AUTO: 0.3 10E9/L (ref 0–0.7)
EOSINOPHIL NFR BLD AUTO: 3.9 %
ERYTHROCYTE [DISTWIDTH] IN BLOOD BY AUTOMATED COUNT: 13.9 % (ref 10–15)
GFR SERPL CREATININE-BSD FRML MDRD: >90 ML/MIN/1.7M2
GLUCOSE SERPL-MCNC: 92 MG/DL (ref 70–99)
HCT VFR BLD AUTO: 37.2 % (ref 35–47)
HGB BLD-MCNC: 12 G/DL (ref 11.7–15.7)
IMM GRANULOCYTES # BLD: 0 10E9/L (ref 0–0.4)
IMM GRANULOCYTES NFR BLD: 0.2 %
LYMPHOCYTES # BLD AUTO: 2 10E9/L (ref 0.8–5.3)
LYMPHOCYTES NFR BLD AUTO: 29.8 %
MCH RBC QN AUTO: 27.7 PG (ref 26.5–33)
MCHC RBC AUTO-ENTMCNC: 32.3 G/DL (ref 31.5–36.5)
MCV RBC AUTO: 86 FL (ref 78–100)
MONOCYTES # BLD AUTO: 0.6 10E9/L (ref 0–1.3)
MONOCYTES NFR BLD AUTO: 9 %
NEUTROPHILS # BLD AUTO: 3.8 10E9/L (ref 1.6–8.3)
NEUTROPHILS NFR BLD AUTO: 56.5 %
PLATELET # BLD AUTO: 286 10E9/L (ref 150–450)
POTASSIUM SERPL-SCNC: 4.1 MMOL/L (ref 3.4–5.3)
PROT SERPL-MCNC: 7.8 G/DL (ref 6.8–8.8)
RBC # BLD AUTO: 4.33 10E12/L (ref 3.8–5.2)
SODIUM SERPL-SCNC: 137 MMOL/L (ref 133–144)
WBC # BLD AUTO: 6.7 10E9/L (ref 4–11)

## 2018-02-22 PROCEDURE — 83630 LACTOFERRIN FECAL (QUAL): CPT | Performed by: INTERNAL MEDICINE

## 2018-02-22 PROCEDURE — 80053 COMPREHEN METABOLIC PANEL: CPT | Performed by: INTERNAL MEDICINE

## 2018-02-22 PROCEDURE — 40000099 ZZH STATISTIC LYMPHEDEMA VISIT: Performed by: REHABILITATION PRACTITIONER

## 2018-02-22 PROCEDURE — 36415 COLL VENOUS BLD VENIPUNCTURE: CPT | Performed by: INTERNAL MEDICINE

## 2018-02-22 PROCEDURE — 87177 OVA AND PARASITES SMEARS: CPT | Performed by: INTERNAL MEDICINE

## 2018-02-22 PROCEDURE — 87209 SMEAR COMPLEX STAIN: CPT | Performed by: INTERNAL MEDICINE

## 2018-02-22 PROCEDURE — 97140 MANUAL THERAPY 1/> REGIONS: CPT | Mod: GP | Performed by: REHABILITATION PRACTITIONER

## 2018-02-22 PROCEDURE — 99214 OFFICE O/P EST MOD 30 MIN: CPT | Performed by: INTERNAL MEDICINE

## 2018-02-22 PROCEDURE — G0463 HOSPITAL OUTPT CLINIC VISIT: HCPCS

## 2018-02-22 PROCEDURE — 85025 COMPLETE CBC W/AUTO DIFF WBC: CPT | Performed by: INTERNAL MEDICINE

## 2018-02-22 PROCEDURE — 87329 GIARDIA AG IA: CPT | Performed by: INTERNAL MEDICINE

## 2018-02-22 RX ORDER — VENLAFAXINE HYDROCHLORIDE 150 MG/1
150 TABLET, EXTENDED RELEASE ORAL
Qty: 30 EACH | Refills: 0 | Status: SHIPPED | OUTPATIENT
Start: 2018-02-22 | End: 2018-04-04

## 2018-02-22 ASSESSMENT — PAIN SCALES - GENERAL
PAINLEVEL: NO PAIN (0)
PAINLEVEL: NO PAIN (0)

## 2018-02-22 NOTE — PROGRESS NOTES
Hematology/ Oncology Follow-up Visit:  Feb 22, 2018    Reason for Visit:   Chief Complaint   Patient presents with     Oncology Clinic Visit     Recheck Metastatic malignant melanoma, Changes in BM        Oncologic History:  No problem-specific Assessment & Plan notes found for this encounter.      Interval History:  Patient returning today because of diarrhea since last week.  She has several bowel movements a day.  She described as being mucus.  She denies any blood in stool.  She denies any nausea or vomiting or diarrhea.  She denies any fever.  Patient currently on immunotherapy with Opdivo.    Review Of Systems:  Constitutional: Negative for fever, chills, and night sweats.  Skin: negative.  Eyes: negative.  Ears/Nose/Throat: negative.  Respiratory: No shortness of breath, dyspnea on exertion, cough, or hemoptysis.  Cardiovascular: negative.  Gastrointestinal: Diarrhea as mentioned above  Genitourinary: negative.  Musculoskeletal: negative.  Neurologic: negative.  Psychiatric: negative.  Hematologic/Lymphatic/Immunologic: negative.  Endocrine: negative.    All other ROS negative unless mentioned in interval history.    Past medical, social, surgical, and family histories reviewed.    Allergies:  Allergies as of 02/22/2018 - Nicola as Reviewed 02/22/2018   Allergen Reaction Noted     Compazine [prochlorperazine] Fatigue 10/12/2017     Fentanyl Other (See Comments) 10/12/2017     Erythrocin Nausea and Vomiting 03/20/2008     Zithromax [azithromycin dihydrate] Diarrhea 02/17/2012       Current Medications:  Current Outpatient Prescriptions   Medication Sig Dispense Refill     aspirin 81 MG EC tablet Take 1 tablet (81 mg) by mouth daily 90 tablet 3     [DISCONTINUED] venlafaxine (EFFEXOR-ER) 150 MG TB24 24 hr tablet Take 1 tablet (150 mg) by mouth daily (with breakfast) 30 each 0     order for DME Equipment being ordered: x2 Wearease compression shirt 1 each 0     order for DME Equipment being ordered: 20-30mmHg  "compression sleeve and 20-30mmHg compression glove\" x2 pair 1 Units 0     rOPINIRole (REQUIP) 0.25 MG tablet Take 1 tablet (0.25 mg) by mouth At Bedtime 30 tablet 11     oxyCODONE IR (ROXICODONE) 5 MG tablet Take 1-3 tablets (5-15 mg) by mouth every 4 hours as needed for pain maximum 12 tablet(s) per day 40 tablet 0     acetaminophen (TYLENOL) 325 MG tablet Take 2 tablets (650 mg) by mouth every 4 hours as needed for other (mild pain) 100 tablet 0     senna-docusate (SENOKOT-S;PERICOLACE) 8.6-50 MG per tablet Take 1 tablet by mouth 2 times daily as needed for constipation 60 tablet 1     LORazepam (ATIVAN) 1 MG tablet Take 1 tablet (1 mg) by mouth every 8 hours as needed for anxiety 30 tablet 0     Cholecalciferol (VITAMIN D3) 3000 UNITS TABS Take 3,000 Int'l Units by mouth daily 30 tablet 3     zonisamide (ZONEGRAN) 25 MG capsule Take 1 tablet (25 mg) once daily for 1 week, then 2 tablets once daily for 1 week, then 3 tablets once daily for 1 week, then 4 tablets once daily for 1 week. 90 capsule 1     venlafaxine (EFFEXOR-ER) 150 MG TB24 24 hr tablet Take 1 tablet (150 mg) by mouth daily (with breakfast) 30 each 0        Physical Exam:  BP (!) 136/91 (BP Location: Right arm, Patient Position: Sitting, Cuff Size: Adult Large)  Pulse 75  Temp 98.3  F (36.8  C) (Tympanic)  Resp 16  Ht 1.6 m (5' 2.99\")  Wt 97.7 kg (215 lb 4.8 oz)  SpO2 100%  Breastfeeding? No  BMI 38.15 kg/m2  Wt Readings from Last 12 Encounters:   02/22/18 97.7 kg (215 lb 4.8 oz)   02/22/18 95.7 kg (211 lb)   02/15/18 98 kg (216 lb 1.6 oz)   01/18/18 97.7 kg (215 lb 6.4 oz)   12/07/17 95.5 kg (210 lb 9.6 oz)   11/24/17 94.3 kg (207 lb 12.8 oz)   11/22/17 93.9 kg (207 lb)   11/14/17 95.9 kg (211 lb 6.7 oz)   11/09/17 94.8 kg (208 lb 14.4 oz)   11/02/17 96.1 kg (211 lb 12.8 oz)   11/02/17 95.8 kg (211 lb 4.8 oz)   10/27/17 95.8 kg (211 lb 1.6 oz)     ECOG performance status: 0  GENERAL APPEARANCE: Healthy, alert and in no acute " distress.  HEENT: Sclerae anicteric. PERRLA. Oropharynx without ulcers, lesions, or thrush.  NECK: Supple. No asymmetry or masses.  LYMPHATICS: No palpable cervical, supraclavicular, axillary, or inguinal lymphadenopathy.  RESP: Lungs clear to auscultation bilaterally without rales, rhonchi or wheezes.  CARDIOVASCULAR: Regular rate and rhythm. Normal S1, S2; no S3 or S4. No murmur, gallop, or rub.  ABDOMEN: Soft, nontender. Bowel sounds normal. No palpable organomegaly or masses.  MUSCULOSKELETAL: Extremities without gross deformities noted. No edema of bilateral lower extremities.  SKIN: No suspicious lesions or rashes.  NEURO: Alert and oriented x 3. Cranial nerves II-XII grossly intact.  PSYCHIATRIC: Mentation and affect appear normal.    Laboratory/Imaging Studies:  Orders Only on 02/20/2018   Component Date Value Ref Range Status     Specimen Description 02/20/2018 Feces   Final     C Diff Toxin B PCR 02/20/2018 Negative  NEG^Negative Final    Comment: Negative: Clostridium difficile target DNA sequences NOT detected, presumed   negative for Clostridium difficile toxin B or the number of bacteria present   may be below the limit of detection for the test.  FDA approved assay performed using [employer] GeneXpert real-time PCR.  A negative result does not exclude actual disease due to Clostridium difficile   and may be due to improper collection, handling and storage of the specimen   or the number of organisms in the specimen is below the detection limit of the   assay.     Infusion Therapy Visit on 02/15/2018   Component Date Value Ref Range Status     Sodium 02/15/2018 135  133 - 144 mmol/L Final     Potassium 02/15/2018 3.9  3.4 - 5.3 mmol/L Final     Chloride 02/15/2018 103  94 - 109 mmol/L Final     Carbon Dioxide 02/15/2018 24  20 - 32 mmol/L Final     Anion Gap 02/15/2018 8  3 - 14 mmol/L Final     Glucose 02/15/2018 127* 70 - 99 mg/dL Final     Urea Nitrogen 02/15/2018 13  7 - 30 mg/dL Final     Creatinine  02/15/2018 0.72  0.52 - 1.04 mg/dL Final     GFR Estimate 02/15/2018 90  >60 mL/min/1.7m2 Final    Non  GFR Calc     GFR Estimate If Black 02/15/2018 >90  >60 mL/min/1.7m2 Final    African American GFR Calc     Calcium 02/15/2018 9.0  8.5 - 10.1 mg/dL Final     Bilirubin Total 02/15/2018 0.2  0.2 - 1.3 mg/dL Final     Albumin 02/15/2018 3.9  3.4 - 5.0 g/dL Final     Protein Total 02/15/2018 8.0  6.8 - 8.8 g/dL Final     Alkaline Phosphatase 02/15/2018 69  40 - 150 U/L Final     ALT 02/15/2018 25  0 - 50 U/L Final     AST 02/15/2018 14  0 - 45 U/L Final     TSH 02/15/2018 1.31  0.40 - 4.00 mU/L Final          Assessment and plan:  (A09) Diarrhea of presumed infectious origin  (primary encounter diagnosis)  C differential was tested negative.  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  We will review causes of diarrhea with the patient.  Diarrhea could be potential side effects of Opdivo.  We will continue to monitor.    (F32.0) Mild major depression (H)  Patient currently on Effexor 150 mg orally daily.    (J45.20) Mild intermittent asthma without complication  Patient has been is currently well-controlled    The patient is ready to learn, no apparent learning barriers were identified.  Diagnosis and treatment plans were explained to the patient. The patient expressed understanding of the content. The patient asked appropriate questions. The patient questions were answered to her satisfaction.    Chart documentation with Dragon Voice recognition Software. Although reviewed after completion, some words and grammatical errors may remain.

## 2018-02-22 NOTE — MR AVS SNAPSHOT
After Visit Summary   2/22/2018    Doretha Fernandez    MRN: [id number]           Patient Information     Date Of Birth          1975        Visit Information        Provider Department      2/22/2018 11:15 AM Kathy Richards MD [facility]        Today's Diagnoses     Diarrhea of presumed infectious origin    -  1      Care Instructions    Dr. Richards recommends you have labs today and provide a stool sample.  We will review these results with you tomorrow. We would like to see you back in clinic with Dr. Richards next week as scheduled.  When you are in need of a refill, please call your pharmacy and they will send us a request.  Copy of appointments, and after visit summary (AVS) given to patient.  If you have any questions during business hours (M-F 8 AM- 4PM), please call Jeanie Keenan RN, BSN, OCN Oncology Hematology /Breast Cancer Navigator at [facility] [phone].   For questions after business hours, or on holidays/weekends, please call our after hours Nurse Triage line [phone]. Thank you.            Follow-ups after your visit        Your next 10 appointments already scheduled     Feb 22, 2018  3:00 PM CST   Lymphedema Treatment with Anastasiya Rodriguez PTA   [facility] Lymphedema ([facility])    [address] [facility] [address]   [phone]            Feb 27, 2018  3:00 PM CST   Lymphedema Treatment with Anastasiya Rodriguez PTA   [facility] Lymphedema ([facility])    [address] [facility] [address]   [phone]            Mar 01, 2018 10:00 AM CST   Level 2 with ROOM 2 [facility] Cancer Infusion ([facility])    [facility]  [address]  [facility] [address]   [phone]            Mar 01, 2018  3:30 PM CST   Lymphedema Treatment with Anastasiya Rodriguez PTA   [facility] Lymphedema ([facility]  [facility])    [address] [facility] [address]   [phone]            Mar 15, 2018  8:30 AM CDT   LAB with [facility] ([facility])    [address] [address]   [phone]           Please do not eat 10-12 hours before your appointment if you are coming in fasting for labs on lipids, cholesterol, or glucose (sugar). This does not apply to pregnant women. Water, hot tea and black coffee (with nothing added) are okay. Do not drink other fluids, diet soda or chew gum.            Mar 15, 2018  9:00 AM CDT   Return Visit with Kathy Richards MD   [facility] ([facility])    [facility]  [address]  [facility] [address]   [phone]            Mar 15, 2018  9:30 AM CDT   Level 2 with ROOM 1 [facility] Infusion ([facility])    [facility]  [address]  [facility] [address]   [phone]            Mar 29, 2018  8:30 AM CDT   Level 2 with ROOM 3 [facility] Infusion ([facility])    [facility]  [address]  [facility] [address]   [phone]            Apr 09, 2018  2:30 PM CDT   Return Visit with Lowell Bullock MD   [facility] ([facility])    [address] [facility] [address]   [phone]              Future tests that were ordered for you today     Open Future Orders        Priority Expected Expires Ordered    CBC with platelets differential Routine 2/22/2018 2/22/2019 2/22/2018    Comprehensive metabolic panel Routine 2/22/2018 2/22/2019 2/22/2018    Ova and Parasite Exam Routine Routine  2/22/2019 2/22/2018    Fecal Lactoferrin Routine  2/22/2019 2/22/2018            Who to contact     If you have questions or need follow up information about today's clinic visit or your schedule please contact [facility]  "CLINIC directly at [phone].  Normal or non-critical lab and imaging results will be communicated to you by MyChart, letter or phone within 4 business days after the clinic has received the results. If you do not hear from us within 7 days, please contact the clinic through Definicarehart or phone. If you have a critical or abnormal lab result, we will notify you by phone as soon as possible.  Submit refill requests through [employer] or call your pharmacy and they will forward the refill request to us. Please allow 3 business days for your refill to be completed.          Additional Information About Your Visit        DefinicareharITeam Information     [employer] gives you secure access to your electronic health record. If you see a primary care provider, you can also send messages to your care team and make appointments. If you have questions, please call your primary care clinic.  If you do not have a primary care provider, please call [phone] and they will assist you.        Care EveryWhere ID     This is your Care EveryWhere ID. This could be used by other organizations to access your [place] medical records  [id number]        Your Vitals Were     Pulse Temperature Respirations Height Pulse Oximetry Breastfeeding?    75 98.3  F (36.8  C) (Tympanic) 16 1.6 m (5' 2.99\") 100% No    BMI (Body Mass Index)                   38.15 kg/m2            Blood Pressure from Last 3 Encounters:   02/22/18 (!) 136/91   02/22/18 165/76   02/15/18 115/70    Weight from Last 3 Encounters:   02/22/18 97.7 kg (215 lb 4.8 oz)   02/22/18 95.7 kg (211 lb)   02/15/18 98 kg (216 lb 1.6 oz)              We Performed the Following     Giardia/Cryptosporidium Atg Panel ([facility]        Primary Care Provider Office Phone # Fax #    Anna Naidu [phone] [address] [address]        Equal Access to Services     BRIGETTE QUARLES AH: Nicol Gustafson, warosada erik, qaybta pancho porter " "anyaestrella boxaan ah. So [facility] [phone].    ATENCIÓN: Si jessicala heidi, tiene a mcdonnell disposición servicios gratuitos de asistencia lingüística. Diana al [phone].    We comply with applicable federal civil rights laws and [place] laws. We do not discriminate on the basis of race, color, national origin, age, disability, sex, sexual orientation, or gender identity.            Thank you!     Thank you for choosing [facility]  for your care. Our goal is always to provide you with excellent care. Hearing back from our patients is one way we can continue to improve our services. Please take a few minutes to complete the written survey that you may receive in the mail after your visit with us. Thank you!             Your Updated Medication List - Protect others around you: Learn how to safely use, store and throw away your medicines at www.disposemymeds.org.          This list is accurate as of 2/22/18 12:19 PM.  Always use your most recent med list.                   Brand Name Dispense Instructions for use Diagnosis    acetaminophen 325 MG tablet    TYLENOL    100 tablet    Take 2 tablets (650 mg) by mouth every 4 hours as needed for other (mild pain)    Malignant melanoma of left upper extremity including shoulder (H)       aspirin 81 MG EC tablet     90 tablet    Take 1 tablet (81 mg) by mouth daily    Prothrombin mutation (H)       LORazepam 1 MG tablet    ATIVAN    30 tablet    Take 1 tablet (1 mg) by mouth every 8 hours as needed for anxiety    Anxiety, Acute intractable tension-type headache       * order for DME     1 Units    Equipment being ordered: 20-30mmHg compression sleeve and 20-30mmHg compression glove\" x2 pair    Lymphedema of left arm       * order for DME     1 each    Equipment being ordered: x2 Wearease compression shirt    Secondary lymphedema       oxyCODONE IR 5 MG tablet    ROXICODONE    40 tablet    Take 1-3 tablets (5-15 mg) by mouth every 4 hours as needed for " pain maximum 12 tablet(s) per day    Cellulitis of chest wall       rOPINIRole 0.25 MG tablet    REQUIP    30 tablet    Take 1 tablet (0.25 mg) by mouth At Bedtime    Restless leg syndrome       senna-docusate 8.6-50 MG per tablet    SENOKOT-S;PERICOLACE    60 tablet    Take 1 tablet by mouth 2 times daily as needed for constipation    Malignant melanoma of left upper extremity including shoulder (H)       venlafaxine 150 MG Tb24 24 hr tablet    EFFEXOR-ER    30 each    Take 1 tablet (150 mg) by mouth daily (with breakfast)    Mild major depression (H)       Vitamin D3 3000 UNITS Tabs     30 tablet    Take 3,000 Int'l Units by mouth daily    Vitamin D deficiency       zonisamide 25 MG capsule    Zonegran    90 capsule    Take 1 tablet (25 mg) once daily for 1 week, then 2 tablets once daily for 1 week, then 3 tablets once daily for 1 week, then 4 tablets once daily for 1 week.    Headache disorder       * Notice:  This list has 2 medication(s) that are the same as other medications prescribed for you. Read the directions carefully, and ask your doctor or other care provider to review them with you.

## 2018-02-22 NOTE — PROGRESS NOTES
Service Date: 02/22/2018      PROBLEM:  Metastatic melanoma to left axilla, status post left axillary lymph node dissection, 1 out of 20 lymph nodes positive, 4.5 cm in greatest dimension without extracapsular extension.      Ms. Fernandez was seen [facility] [place] on 02/22/2018 at the request of Dr. Kathy Richards.      HISTORY OF PRESENT ILLNESS:  Ms. Fernandez is a 42-year-old female with recently diagnosed metastatic melanoma.  She presented to her primary care physician in 09/2017 with left axilla adenopathy.  This was initially felt to be possibly related to breast cancer.  However, diagnostic mammogram and ultrasound on 10/04 did not show any suspicious findings.  On the ultrasound, the left axillary lymph node measured 3.3 cm.  Ultrasound-guided core biopsy revealed metastatic malignant melanoma.  She was subsequently referred to Dr. Bullock in Dermatology.  Biopsies were taken from 3 lesions on the left flank, left mid back and right upper back, respectively.  However, none of these showed melanoma.  She was evaluated with PET CT scan on 10/11.  Other than the known left axillary lymph node which measured 3.7 x 3.2 cm with max SUV of 38.4, there was no other abnormality except for uptake in colon.  She had a colonoscopy on 10/18 which was normal.      On 10/23/2017, Ms. Fernandez underwent left axillary lymph node dissection by Dr. Laurent Cool.  Final pathology revealed high-grade spindle cell malignancy most consistent with melanoma involving 1 of 20 nodes.  Tumor size was 4.5 cm without extranodal extension.  Postoperatively, she did develop cellulitis which eventually resolved.  She was staged with a brain MRI which was negative.  Dr. Richards recommended immunotherapy with nivolumab for a total of 1 year.  She started nivolumab 11/24/2017.  Overall, she is tolerating the treatment well.  She does report some mild skin itching and feeling short winded.  She is most  recently bothered by passing pus from her rectum.  She submitted a stool sample last Tuesday and C diff toxin B was negative.      PAST MEDICAL HISTORY/PAST SURGICAL HISTORY:   1.  History of TIA in 2004.     2.  Prothrombin deficiency.   3.  Mild persistent asthma.   4.  Left lower back pain.   5.  Depression.   6.  Anxiety.      CHEMOTHERAPY HISTORY:  She is currently on nivolumab under the direction of Dr. Kathy Richards.  She started therapy on 11/24/2017.      PAST RADIATION HISTORY:  None.      IMPLANTABLE CARDIAC DEVICE:  None.      MEDICATIONS:   1.  Baby aspirin.   2.  Effexor extended release.   3.  Ropinirole.    4.  Roxicodone.   5.  Tylenol.   6.  Senokot-S.   7.  Ativan.   8.  Vitamin D3.   9.  Zonisamide.      ALLERGIES:  She is allergic to Compazine which causes fatigue, fentanyl, erythromycin and azithromycin.      FAMILY HISTORY:  Paternal grandmother had colorectal cancer.  There is otherwise no family history of malignancy.      SOCIAL HISTORY:  She had secondhand smoke exposure until 1998.  She has never used smokeless tobacco.  She does not drink alcohol.  She is accompanied today by her sister.      REVIEW OF SYSTEMS:  A full review of system was performed by the nursing staff.  Please see their assessment sheet for details.  Positives include feeling short winded, some skin itching and she has been having some loose stools with pus from her rectum.      PHYSICAL EXAMINATION:   VITAL SIGNS:  Blood pressure 136/91, pulse 75, respiratory rate 16, oxygen saturation 100%, weight 215 pounds, height 5 feet 3 inches.   GENERAL:  She appears well, is in no acute distress, alert and oriented.  Speech fluent, interaction appropriate.   HEENT:  Unremarkable.   NECK:  Supple.  No palpable cervical or supraclavicular adenopathy.   AXILLAE:  No palpable axillary adenopathy.  Specifically, I do not feel any mass under her left axilla.   CARDIOVASCULAR:  Well perfused, no cyanosis.   RESPIRATORY:  Breathing  comfortably on room air.  No audible wheezing.   ABDOMEN:  Soft, nontender.   EXTREMITIES:  Mild swelling of the left upper extremity.   NEUROLOGIC:  Grossly intact.      ASSESSMENT AND PLAN:  In summary, Ms. Moreira is a 42-year-old female with metastatic melanoma to left axilla.  Her primary site has not yet been identified.  She is status post left axillary lymph node dissection and was found to have 1 out of 20 positive lymph nodes measuring 4.5 cm without extracapsular extension.  She is currently on systemic therapy with nivolumab.      We discussed that there is some risk of local regional recurrence following axillary lymph node dissection.  I discussed with her the TROG trial conducted in [place] which examined the role of adjuvant radiation therapy in this setting.  Ms. Moreira would have been eligible for this trial due to the large size of the node measuring 4.5 cm, although she does not have any extracapsular extension and had only 1 involved node.  In that trial, the local regional recurrence was 36% without radiation versus 21% with radiation at 6 year follow up.  Although this difference is statistically significant, there is no difference in disease-free survival and overall survival due to successful salvage of local recurrence as well as distant metastases being the predominant mode of recurrence.  In addition, patients who received radiation had significant more severe lymphedema.  For that reason, I do not recommend adjuvant radiation.  She should continue with Opdivo with Dr. Richards.  We would consider adjuvant radiation should she develop recurrence in her axilla in the future.      Ms. Moreira and her sister are satisfied with the recommendation.  She will continue to follow up with Dr. Richards.      Thanks for allowing us to participate in her care.         LINDSEY OROSCO MD             D: 02/22/2018   T: 02/22/2018   MT: [email or web address]      Name:     ELIZABETH MOREIRA   MRN:      [id number]         Account:      [id number]   :      1975           Service Date: 2018      Document: [id number]      CC  Patient Care Team:  Anna Naidu MD as PCP - General (Family Practice)  Bairon Miranda MD as MD (Neurology)  Cheo Norton MD as MD (Ophthalmology)  Laura Garcia RN as Nurse Coordinator (Surgery Surgical Oncology)  Jeanie Keenan, RN as  (Hematology & Oncology)  Kathy Spence MD as MD (Hematology & Oncology)  KATHY SPENCE

## 2018-02-22 NOTE — PATIENT INSTRUCTIONS
Dr. Richards recommends you have labs today and provide a stool sample.  We will review these results with you tomorrow. We would like to see you back in clinic with Dr. Richards next week as scheduled.  When you are in need of a refill, please call your pharmacy and they will send us a request.  Copy of appointments, and after visit summary (AVS) given to patient.  If you have any questions during business hours (M-F 8 AM- 4PM), please call Jeanie Keenan RN, BSN, OCN Oncology Hematology /Breast Cancer Navigator at [facility] [phone].   For questions after business hours, or on holidays/weekends, please call our after hours Nurse Triage line [phone]. Thank you.

## 2018-02-22 NOTE — NURSING NOTE
"Oncology Rooming Note    February 22, 2018 11:10 AM   Doretha Fernandez is a 42 year old female who presents for:    Chief Complaint   Patient presents with     Oncology Clinic Visit     Recheck Metastatic malignant melanoma, Changes in BM      Initial Vitals: BP (!) 136/91 (BP Location: Right arm, Patient Position: Sitting, Cuff Size: Adult Large)  Pulse 75  Temp 98.3  F (36.8  C) (Tympanic)  Resp 16  Ht 1.6 m (5' 2.99\")  Wt 97.7 kg (215 lb 4.8 oz)  SpO2 100%  Breastfeeding? No  BMI 38.15 kg/m2 Estimated body mass index is 38.15 kg/(m^2) as calculated from the following:    Height as of this encounter: 1.6 m (5' 2.99\").    Weight as of this encounter: 97.7 kg (215 lb 4.8 oz). Body surface area is 2.08 meters squared.  No Pain (0) Comment: Data Unavailable   No LMP recorded.  Allergies reviewed: Yes  Medications reviewed: Yes    Medications: Medication refills not needed today.  Pharmacy name entered into eziCONEX: [facility] [place], [place] - [address]    Clinical concerns: Recheck Metastatic malignant melanoma, Changes in BM.     1. Patient reports she has had mucus & blood incontinence from her rectum x 6 days. Denies fever or chills. Is this from Opdivo.    2. C- diff was Negative.     7  minutes for nursing intake (face to face time)     Lesa Patel CMA              "

## 2018-02-22 NOTE — TELEPHONE ENCOUNTER
Stool result is negative for Cdiff.  Pt here in clinic questioning if her loose stools are related to the Opdivo.  Will add her to Dr. Richards's schedule today for evaluation.

## 2018-02-22 NOTE — MR AVS SNAPSHOT
After Visit Summary   2/22/2018    Doretha Fernandez    MRN: [id number]           Patient Information     Date Of Birth          1975        Visit Information        Provider Department      2/22/2018 10:00 AM Antonia Nair [facility]        Today's Diagnoses     Malignant melanoma of left upper extremity including shoulder (H)    -  1       Follow-ups after your visit        Your next 10 appointments already scheduled     Feb 27, 2018  3:00 PM CST   Lymphedema Treatment with Anastasiya Rodriguez PTA   [facility] Lymphedema ([facility])    [address] [address]   [phone]            Mar 01, 2018 10:00 AM CST   Level 2 with ROOM 2 [facility] ([facility])    [facility]  [address]  [facility] [address]   [phone]            Mar 01, 2018  3:30 PM CST   Lymphedema Treatment with Anastasiya Rodriguez PTA   [facility] Lymphedema ([facility])    [address] [address]   [phone]            Mar 15, 2018  8:30 AM CDT   LAB with [facility] ([facility])    [address] [address]   [phone]           Please do not eat 10-12 hours before your appointment if you are coming in fasting for labs on lipids, cholesterol, or glucose (sugar). This does not apply to pregnant women. Water, hot tea and black coffee (with nothing added) are okay. Do not drink other fluids, diet soda or chew gum.            Mar 15, 2018  9:00 AM CDT   Return Visit with Kathy Richards MD   [facility] ([facility])    [facility]  [address]  [facility] [address]   [phone]            Mar 15, 2018  9:30 AM CDT   Level 2 with ROOM 1 [facility] ([facility])    [facility]  [address]  [facility] [address]   [phone]            Mar 29, 2018  8:30 AM CDT   Level 2 with ROOM 3 [facility] ([facility])    [facility]  [address]  [facility] [address]   [phone]            Apr 09, 2018  2:30 PM CDT   Return Visit with Lowell Bullock MD   [facility] ([facility])    [address] [address]   [phone]              Future tests that were ordered for you today     Open Future Orders        Priority Expected Expires Ordered    CBC with platelets differential Routine 2/22/2018 2/22/2019 2/22/2018    Comprehensive metabolic panel Routine 2/22/2018 2/22/2019 2/22/2018    Ova and Parasite Exam Routine Routine  2/22/2019 2/22/2018    Fecal Lactoferrin Routine  2/22/2019 2/22/2018            Who to contact     Please call your clinic at [phone] to:    Ask questions about your health    Make or cancel appointments    Discuss your medicines    Learn about your test results    Speak to your doctor            Additional Information About Your Visit        Hi[employer] Information     [employer] gives you secure access to your electronic health record. If you see a primary care provider, you can also send messages to your care team and make appointments. If you have questions, please call your primary care clinic.  If you do not have a primary care provider, please call [phone] and they will assist you.      [employer] is an electronic gateway that provides easy, online access to your medical records. With [employer], you can request a clinic appointment, read your test results, renew a prescription or communicate with your care team.     To access your existing account, please contact your [facility] or call [phone] for assistance.        Care EveryWhere ID     This is your Care EveryWhere ID. This could be used by other  organizations to access your [place] medical records  [id number]        Your Vitals Were     Pulse Respirations Pulse Oximetry BMI (Body Mass Index)          78 14 99% 37.39 kg/m2         Blood Pressure from Last 3 Encounters:   02/22/18 (!) 136/91   02/22/18 165/76   02/15/18 115/70    Weight from Last 3 Encounters:   02/22/18 97.7 kg (215 lb 4.8 oz)   02/22/18 95.7 kg (211 lb)   02/15/18 98 kg (216 lb 1.6 oz)              Today, you had the following     No orders found for display         Today's Medication Changes          These changes are accurate as of 2/22/18 11:59 PM.  If you have any questions, ask your nurse or doctor.               Start taking these medicines.        Dose/Directions    * tinidazole 500 MG Tabs   Commonly known as:  TINDAMAX   Used for:  Diarrhea of presumed infectious origin   Started by:  Kathy Richards MD        Dose:  2000 mg   Take 4 tablets (2,000 mg) by mouth once for 1 dose   Quantity:  4 tablet   Refills:  0       * tinidazole 500 MG Tabs   Commonly known as:  TINDAMAX   Used for:  Mild major depression (H), Diarrhea   Started by:  Jeanie Keenan RN        Dose:  2000 mg   Take 4 tablets (2,000 mg) by mouth once for 1 dose   Quantity:  4 tablet   Refills:  0       * Notice:  This list has 2 medication(s) that are the same as other medications prescribed for you. Read the directions carefully, and ask your doctor or other care provider to review them with you.         Where to get your medicines      These medications were sent to [facility] [place] - [address] [address] [facility] [address]  [address] [facility] [address]     Phone:  [phone]     tinidazole 500 MG Tabs    tinidazole 500 MG Tabs    venlafaxine 150 MG Tb24 24 hr tablet                Primary Care Provider Office Phone # Fax #    Anna Naidu [phone] [address] [facility] [address]        Equal Access to Services     [facility]  "AH: Hadii josy garnett hadatifo Soayeshaali, waaxda luqadaha, qaybta kaalmada chris, pancho bernice mylesbeth leavittchasity herrera laJorgecolin castillo. So [facility] [phone].    ATENCIÓN: Si habla español, tiene a mcdonnell disposición servicios gratuitos de asistencia lingüística. Liame al [phone].    We comply with applicable federal civil rights laws and [place] laws. We do not discriminate on the basis of race, color, national origin, age, disability, sex, sexual orientation, or gender identity.            Thank you!     Thank you for choosing [facility]  for your care. Our goal is always to provide you with excellent care. Hearing back from our patients is one way we can continue to improve our services. Please take a few minutes to complete the written survey that you may receive in the mail after your visit with us. Thank you!             Your Updated Medication List - Protect others around you: Learn how to safely use, store and throw away your medicines at www.disposemymeds.org.          This list is accurate as of 2/22/18 11:59 PM.  Always use your most recent med list.                   Brand Name Dispense Instructions for use Diagnosis    acetaminophen 325 MG tablet    TYLENOL    100 tablet    Take 2 tablets (650 mg) by mouth every 4 hours as needed for other (mild pain)    Malignant melanoma of left upper extremity including shoulder (H)       aspirin 81 MG EC tablet     90 tablet    Take 1 tablet (81 mg) by mouth daily    Prothrombin mutation (H)       LORazepam 1 MG tablet    ATIVAN    30 tablet    Take 1 tablet (1 mg) by mouth every 8 hours as needed for anxiety    Anxiety, Acute intractable tension-type headache       * order for DME     1 Units    Equipment being ordered: 20-30mmHg compression sleeve and 20-30mmHg compression glove\" x2 pair    Lymphedema of left arm       * order for DME     1 each    Equipment being ordered: x2 Wearease compression shirt    Secondary lymphedema       oxyCODONE IR 5 MG tablet    " ROXICODONE    40 tablet    Take 1-3 tablets (5-15 mg) by mouth every 4 hours as needed for pain maximum 12 tablet(s) per day    Cellulitis of chest wall       rOPINIRole 0.25 MG tablet    REQUIP    30 tablet    Take 1 tablet (0.25 mg) by mouth At Bedtime    Restless leg syndrome       senna-docusate 8.6-50 MG per tablet    SENOKOT-S;PERICOLACE    60 tablet    Take 1 tablet by mouth 2 times daily as needed for constipation    Malignant melanoma of left upper extremity including shoulder (H)       * tinidazole 500 MG Tabs    TINDAMAX    4 tablet    Take 4 tablets (2,000 mg) by mouth once for 1 dose    Diarrhea of presumed infectious origin       * tinidazole 500 MG Tabs    TINDAMAX    4 tablet    Take 4 tablets (2,000 mg) by mouth once for 1 dose    Mild major depression (H), Diarrhea       venlafaxine 150 MG Tb24 24 hr tablet    EFFEXOR-ER    30 each    Take 1 tablet (150 mg) by mouth daily (with breakfast)    Mild major depression (H), Diarrhea       Vitamin D3 3000 UNITS Tabs     30 tablet    Take 3,000 Int'l Units by mouth daily    Vitamin D deficiency       zonisamide 25 MG capsule    Zonegran    90 capsule    Take 1 tablet (25 mg) once daily for 1 week, then 2 tablets once daily for 1 week, then 3 tablets once daily for 1 week, then 4 tablets once daily for 1 week.    Headache disorder       * Notice:  This list has 4 medication(s) that are the same as other medications prescribed for you. Read the directions carefully, and ask your doctor or other care provider to review them with you.

## 2018-02-22 NOTE — LETTER
2/22/2018         RE: Doretha Fernandez  [address]  [place] [address]        Dear Colleague,    Thank you for referring your patient, Doretha Fernandez, to the [facility]. Please see a copy of my visit note below.    Hematology/ Oncology Follow-up Visit:  Feb 22, 2018    Reason for Visit:   Chief Complaint   Patient presents with     Oncology Clinic Visit     Recheck Metastatic malignant melanoma, Changes in BM        Oncologic History:  No problem-specific Assessment & Plan notes found for this encounter.      Interval History:  Patient returning today because of diarrhea since last week.  She has several bowel movements a day.  She described as being mucus.  She denies any blood in stool.  She denies any nausea or vomiting or diarrhea.  She denies any fever.  Patient currently on immunotherapy with Opdivo.    Review Of Systems:  Constitutional: Negative for fever, chills, and night sweats.  Skin: negative.  Eyes: negative.  Ears/Nose/Throat: negative.  Respiratory: No shortness of breath, dyspnea on exertion, cough, or hemoptysis.  Cardiovascular: negative.  Gastrointestinal: Diarrhea as mentioned above  Genitourinary: negative.  Musculoskeletal: negative.  Neurologic: negative.  Psychiatric: negative.  Hematologic/Lymphatic/Immunologic: negative.  Endocrine: negative.    All other ROS negative unless mentioned in interval history.    Past medical, social, surgical, and family histories reviewed.    Allergies:  Allergies as of 02/22/2018 - Nicola as Reviewed 02/22/2018   Allergen Reaction Noted     Compazine [prochlorperazine] Fatigue 10/12/2017     Fentanyl Other (See Comments) 10/12/2017     Erythrocin Nausea and Vomiting 03/20/2008     Zithromax [azithromycin dihydrate] Diarrhea 02/17/2012       Current Medications:  Current Outpatient Prescriptions   Medication Sig Dispense Refill     aspirin 81 MG EC tablet Take 1 tablet (81 mg) by mouth daily 90 tablet 3     [DISCONTINUED] venlafaxine  "(EFFEXOR-ER) 150 MG TB24 24 hr tablet Take 1 tablet (150 mg) by mouth daily (with breakfast) 30 each 0     order for DME Equipment being ordered: x2 Wearease compression shirt 1 each 0     order for DME Equipment being ordered: 20-30mmHg compression sleeve and 20-30mmHg compression glove\" x2 pair 1 Units 0     rOPINIRole (REQUIP) 0.25 MG tablet Take 1 tablet (0.25 mg) by mouth At Bedtime 30 tablet 11     oxyCODONE IR (ROXICODONE) 5 MG tablet Take 1-3 tablets (5-15 mg) by mouth every 4 hours as needed for pain maximum 12 tablet(s) per day 40 tablet 0     acetaminophen (TYLENOL) 325 MG tablet Take 2 tablets (650 mg) by mouth every 4 hours as needed for other (mild pain) 100 tablet 0     senna-docusate (SENOKOT-S;PERICOLACE) 8.6-50 MG per tablet Take 1 tablet by mouth 2 times daily as needed for constipation 60 tablet 1     LORazepam (ATIVAN) 1 MG tablet Take 1 tablet (1 mg) by mouth every 8 hours as needed for anxiety 30 tablet 0     Cholecalciferol (VITAMIN D3) 3000 UNITS TABS Take 3,000 Int'l Units by mouth daily 30 tablet 3     zonisamide (ZONEGRAN) 25 MG capsule Take 1 tablet (25 mg) once daily for 1 week, then 2 tablets once daily for 1 week, then 3 tablets once daily for 1 week, then 4 tablets once daily for 1 week. 90 capsule 1     venlafaxine (EFFEXOR-ER) 150 MG TB24 24 hr tablet Take 1 tablet (150 mg) by mouth daily (with breakfast) 30 each 0        Physical Exam:  BP (!) 136/91 (BP Location: Right arm, Patient Position: Sitting, Cuff Size: Adult Large)  Pulse 75  Temp 98.3  F (36.8  C) (Tympanic)  Resp 16  Ht 1.6 m (5' 2.99\")  Wt 97.7 kg (215 lb 4.8 oz)  SpO2 100%  Breastfeeding? No  BMI 38.15 kg/m2  Wt Readings from Last 12 Encounters:   02/22/18 97.7 kg (215 lb 4.8 oz)   02/22/18 95.7 kg (211 lb)   02/15/18 98 kg (216 lb 1.6 oz)   01/18/18 97.7 kg (215 lb 6.4 oz)   12/07/17 95.5 kg (210 lb 9.6 oz)   11/24/17 94.3 kg (207 lb 12.8 oz)   11/22/17 93.9 kg (207 lb)   11/14/17 95.9 kg (211 lb 6.7 oz) "   11/09/17 94.8 kg (208 lb 14.4 oz)   11/02/17 96.1 kg (211 lb 12.8 oz)   11/02/17 95.8 kg (211 lb 4.8 oz)   10/27/17 95.8 kg (211 lb 1.6 oz)     ECOG performance status: 0  GENERAL APPEARANCE: Healthy, alert and in no acute distress.  HEENT: Sclerae anicteric. PERRLA. Oropharynx without ulcers, lesions, or thrush.  NECK: Supple. No asymmetry or masses.  LYMPHATICS: No palpable cervical, supraclavicular, axillary, or inguinal lymphadenopathy.  RESP: Lungs clear to auscultation bilaterally without rales, rhonchi or wheezes.  CARDIOVASCULAR: Regular rate and rhythm. Normal S1, S2; no S3 or S4. No murmur, gallop, or rub.  ABDOMEN: Soft, nontender. Bowel sounds normal. No palpable organomegaly or masses.  MUSCULOSKELETAL: Extremities without gross deformities noted. No edema of bilateral lower extremities.  SKIN: No suspicious lesions or rashes.  NEURO: Alert and oriented x 3. Cranial nerves II-XII grossly intact.  PSYCHIATRIC: Mentation and affect appear normal.    Laboratory/Imaging Studies:  Orders Only on 02/20/2018   Component Date Value Ref Range Status     Specimen Description 02/20/2018 Feces   Final     C Diff Toxin B PCR 02/20/2018 Negative  NEG^Negative Final    Comment: Negative: Clostridium difficile target DNA sequences NOT detected, presumed   negative for Clostridium difficile toxin B or the number of bacteria present   may be below the limit of detection for the test.  FDA approved assay performed using [employer] GeneXpert real-time PCR.  A negative result does not exclude actual disease due to Clostridium difficile   and may be due to improper collection, handling and storage of the specimen   or the number of organisms in the specimen is below the detection limit of the   assay.     Infusion Therapy Visit on 02/15/2018   Component Date Value Ref Range Status     Sodium 02/15/2018 135  133 - 144 mmol/L Final     Potassium 02/15/2018 3.9  3.4 - 5.3 mmol/L Final     Chloride 02/15/2018 103  94 - 109 mmol/L  Final     Carbon Dioxide 02/15/2018 24  20 - 32 mmol/L Final     Anion Gap 02/15/2018 8  3 - 14 mmol/L Final     Glucose 02/15/2018 127* 70 - 99 mg/dL Final     Urea Nitrogen 02/15/2018 13  7 - 30 mg/dL Final     Creatinine 02/15/2018 0.72  0.52 - 1.04 mg/dL Final     GFR Estimate 02/15/2018 90  >60 mL/min/1.7m2 Final    Non  GFR Calc     GFR Estimate If Black 02/15/2018 >90  >60 mL/min/1.7m2 Final    African American GFR Calc     Calcium 02/15/2018 9.0  8.5 - 10.1 mg/dL Final     Bilirubin Total 02/15/2018 0.2  0.2 - 1.3 mg/dL Final     Albumin 02/15/2018 3.9  3.4 - 5.0 g/dL Final     Protein Total 02/15/2018 8.0  6.8 - 8.8 g/dL Final     Alkaline Phosphatase 02/15/2018 69  40 - 150 U/L Final     ALT 02/15/2018 25  0 - 50 U/L Final     AST 02/15/2018 14  0 - 45 U/L Final     TSH 02/15/2018 1.31  0.40 - 4.00 mU/L Final          Assessment and plan:  (A09) Diarrhea of presumed infectious origin  (primary encounter diagnosis)  C differential was tested negative.  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  We will review causes of diarrhea with the patient.  Diarrhea could be potential side effects of Opdivo.  We will continue to monitor.    (F32.0) Mild major depression (H)  Patient currently on Effexor 150 mg orally daily.    (J45.20) Mild intermittent asthma without complication  Patient has been is currently well-controlled    The patient is ready to learn, no apparent learning barriers were identified.  Diagnosis and treatment plans were explained to the patient. The patient expressed understanding of the content. The patient asked appropriate questions. The patient questions were  answered to her satisfaction.    Chart documentation with Dragon Voice recognition Software. Although reviewed after completion, some words and grammatical errors may remain.    Again, thank you for allowing me to participate in the care of your patient.        Sincerely,        Kathy Richards MD

## 2018-02-23 LAB
G LAMBLIA AG STL QL IA: ABNORMAL
LACTOFERRIN STL QL IA: POSITIVE
O+P STL MICRO: NORMAL
SPECIMEN SOURCE: ABNORMAL
SPECIMEN SOURCE: NORMAL

## 2018-02-23 RX ORDER — TINIDAZOLE 500 MG/1
2000 TABLET ORAL ONCE
Qty: 4 TABLET | Refills: 0 | Status: SHIPPED | OUTPATIENT
Start: 2018-02-23 | End: 2019-02-18

## 2018-02-23 NOTE — PROGRESS NOTES
Patient returned call to clinic.  Reviewed results and recommendations with patient. Will  medication today.  Denies questions at this time, will call back if any arise.  Direct line provided.

## 2018-02-23 NOTE — PROGRESS NOTES
Message left for patient to return call to clinic for results and Dr. Richards's recommendations. Direct line provided.  Tinidazole escribed to pharmacy.

## 2018-02-26 NOTE — TELEPHONE ENCOUNTER
"Stool positive for Giardia and fecal lactoferrin.  Dr. Richards recommended Trinidazole 2 grams on Friday 02.23.18.    Spoke with patient today for status check.  Pt reports she took the trinidazole on Friday and continues to have diarrhea. Pt states, \"It's not any better, still the liquid diarrhea with spots of blood\".    Reviewed with Dr. Ghosh, who recommends patient monitor symptoms and call us back if symptoms aren't improving by Wednesday morning.  "

## 2018-02-28 ENCOUNTER — HOSPITAL ENCOUNTER (OUTPATIENT)
Dept: LAB | Facility: CLINIC | Age: 43
Discharge: HOME | End: 2018-02-28
Attending: INTERNAL MEDICINE | Admitting: INTERNAL MEDICINE
Payer: COMMERCIAL

## 2018-02-28 DIAGNOSIS — R19.7 DIARRHEA: ICD-10-CM

## 2018-02-28 DIAGNOSIS — C43.9 METASTATIC MALIGNANT MELANOMA (H): ICD-10-CM

## 2018-02-28 LAB
CRYPTOSP AG STL QL: NORMAL
LACTOFERRIN STL QL IA: POSITIVE
SPECIMEN SOURCE: NORMAL

## 2018-02-28 PROCEDURE — 87209 SMEAR COMPLEX STAIN: CPT | Performed by: INTERNAL MEDICINE

## 2018-02-28 PROCEDURE — 87328 CRYPTOSPORIDIUM AG IA: CPT | Performed by: INTERNAL MEDICINE

## 2018-02-28 PROCEDURE — 83630 LACTOFERRIN FECAL (QUAL): CPT | Performed by: INTERNAL MEDICINE

## 2018-02-28 PROCEDURE — 87329 GIARDIA AG IA: CPT | Performed by: INTERNAL MEDICINE

## 2018-02-28 PROCEDURE — 87177 OVA AND PARASITES SMEARS: CPT | Performed by: INTERNAL MEDICINE

## 2018-02-28 NOTE — PROGRESS NOTES
Outpatient Physical Therapy Progress Note     Patient: Doretha Fernandez  : 1975    Beginning/End Dates of Reporting Period:  2018 to 2018    Referring Provider: Jessica Castellano PA-C    Therapy Diagnosis: LUE and breast lymphedema      Client Self Report: UE feels like it is decreased wearing 1 piece spandigrip but shoulder and breast feel swollen     Objective Measurements:  Objective Measure: LUE girth  Details: -4.3% since initial evaluation    Objective Measure: L-breast girth  Details: -6.2cm since last measured on 18     Outcome Measures (most recent score):   LLIS = 41 on date of initial evaluation; pt will again complete LLIS at time of discharge    Goals:  Goal Identifier stg   Goal Description pt to be independent with self-MLD of LUE for decreased edema reduction response and improved comfort with use   Target Date 17   Date Met  17   Progress:     Goal Identifier stg   Goal Description pt to be independent with donning, doffing and care of compression sleeve and glove for LUE management during upcoming flight/vacation   Target Date 17   Date Met  17   Progress:     Goal Identifier ltg   Goal Description pt to have increased LUE GH flexion and abduction to 130 degrees to increase use of LUE for overhead activities and ADLs   Target Date 18   Date Met      Progress:     Goal Identifier ltg   Goal Description pt to be independent with LUE lymphedema management via HEP, elevation, compression garment wear and self-MLD   Target Date 18   Date Met      Progress:     Goal Identifier ltg   Goal Description pt to have at least 8 point improvement on LLIS due to decreased lymphedema and increased comfort and ROM in LUE   Target Date 18   Date Met      Progress:     Progress Toward Goals:   Patient continues to make fair progress toward goals. LUE and L-breast girth has decreased since initial eval (see above for details), but ongoing 2x/week treatment  still needed as arm fluctuates with oncology treatments and continues to need close monitoring.  Pt continues to still be in the process of getting reviewed for a Flexitouch compression pump (by insurance).    Plan:  Continue therapy per current plan of care.    Discharge:  No

## 2018-02-28 NOTE — TELEPHONE ENCOUNTER
Stool samples are in process at this time.    Per Dr. Ghosh, hold Opdivo infusions at this time.    Infusion and scheduling updated.  Message left for patient with this information.  Requested a call back from patient.  Direct line provided.

## 2018-02-28 NOTE — TELEPHONE ENCOUNTER
"Patient returned call to clinic.  She reports the diarrhea is :much worse.  Horrible\".      Per Dr. Ghosh, patient is to repeat stool testing today.      Patient updated with this information.  She is at work and will come to clinic \"at some point today\" to  specimen containers.    Containers labelled and placed at  for .  "

## 2018-02-28 NOTE — ADDENDUM NOTE
Encounter addended by: Anna Graham, PT on: 2/28/2018  1:36 PM<BR>     Actions taken: Flowsheet accepted, Sign clinical note

## 2018-03-01 ENCOUNTER — INFUSION THERAPY VISIT (OUTPATIENT)
Dept: INFUSION THERAPY | Facility: CLINIC | Age: 43
End: 2018-03-01
Attending: INTERNAL MEDICINE
Payer: COMMERCIAL

## 2018-03-01 ENCOUNTER — HOSPITAL ENCOUNTER (OUTPATIENT)
Facility: CLINIC | Age: 43
Discharge: HOME | End: 2018-03-01
Attending: INTERNAL MEDICINE | Admitting: INTERNAL MEDICINE
Payer: COMMERCIAL

## 2018-03-01 VITALS
RESPIRATION RATE: 16 BRPM | DIASTOLIC BLOOD PRESSURE: 62 MMHG | HEART RATE: 82 BPM | SYSTOLIC BLOOD PRESSURE: 116 MMHG | TEMPERATURE: 97.7 F

## 2018-03-01 DIAGNOSIS — K52.9 COLITIS: ICD-10-CM

## 2018-03-01 DIAGNOSIS — C43.9 METASTATIC MALIGNANT MELANOMA (H): Primary | ICD-10-CM

## 2018-03-01 DIAGNOSIS — C43.9 METASTATIC MALIGNANT MELANOMA (H): ICD-10-CM

## 2018-03-01 DIAGNOSIS — K52.9 COLITIS: Primary | ICD-10-CM

## 2018-03-01 LAB
ALBUMIN SERPL-MCNC: 3.2 G/DL (ref 3.4–5)
ALP SERPL-CCNC: 75 U/L (ref 40–150)
ALT SERPL W P-5'-P-CCNC: 43 U/L (ref 0–50)
ANION GAP SERPL CALCULATED.3IONS-SCNC: 8 MMOL/L (ref 3–14)
AST SERPL W P-5'-P-CCNC: 31 U/L (ref 0–45)
BILIRUB SERPL-MCNC: 0.3 MG/DL (ref 0.2–1.3)
BUN SERPL-MCNC: 10 MG/DL (ref 7–30)
CALCIUM SERPL-MCNC: 8.7 MG/DL (ref 8.5–10.1)
CHLORIDE SERPL-SCNC: 107 MMOL/L (ref 94–109)
CO2 SERPL-SCNC: 21 MMOL/L (ref 20–32)
CREAT SERPL-MCNC: 0.52 MG/DL (ref 0.52–1.04)
CRYPTOSP AG STL QL IA: NEGATIVE
G LAMBLIA AG STL QL IA: NORMAL
GFR SERPL CREATININE-BSD FRML MDRD: >90 ML/MIN/1.7M2
GLUCOSE SERPL-MCNC: 167 MG/DL (ref 70–99)
O+P STL MICRO: NORMAL
POTASSIUM SERPL-SCNC: 4.3 MMOL/L (ref 3.4–5.3)
PROT SERPL-MCNC: 7.7 G/DL (ref 6.8–8.8)
SODIUM SERPL-SCNC: 136 MMOL/L (ref 133–144)
SPECIMEN SOURCE: NORMAL
SPECIMEN SOURCE: NORMAL

## 2018-03-01 PROCEDURE — 80053 COMPREHEN METABOLIC PANEL: CPT | Performed by: INTERNAL MEDICINE

## 2018-03-01 PROCEDURE — 25000128 H RX IP 250 OP 636: Performed by: INTERNAL MEDICINE

## 2018-03-01 PROCEDURE — 96374 THER/PROPH/DIAG INJ IV PUSH: CPT

## 2018-03-01 PROCEDURE — 25000125 ZZHC RX 250: Performed by: INTERNAL MEDICINE

## 2018-03-01 PROCEDURE — 96361 HYDRATE IV INFUSION ADD-ON: CPT

## 2018-03-01 PROCEDURE — 96375 TX/PRO/DX INJ NEW DRUG ADDON: CPT

## 2018-03-01 RX ORDER — METHYLPREDNISOLONE SODIUM SUCCINATE 125 MG/2ML
120 INJECTION, POWDER, LYOPHILIZED, FOR SOLUTION INTRAMUSCULAR; INTRAVENOUS ONCE
Status: CANCELLED
Start: 2018-03-01 | End: 2018-03-01

## 2018-03-01 RX ORDER — SODIUM CHLORIDE AND POTASSIUM CHLORIDE 150; 900 MG/100ML; MG/100ML
INJECTION, SOLUTION INTRAVENOUS CONTINUOUS
Status: CANCELLED
Start: 2018-03-01

## 2018-03-01 RX ORDER — PREDNISONE 10 MG/1
TABLET ORAL
Qty: 84 TABLET | Refills: 0 | Status: ON HOLD | OUTPATIENT
Start: 2018-03-01 | End: 2018-03-08

## 2018-03-01 RX ORDER — METHYLPREDNISOLONE SODIUM SUCCINATE 125 MG/2ML
120 INJECTION, POWDER, LYOPHILIZED, FOR SOLUTION INTRAMUSCULAR; INTRAVENOUS ONCE
Status: COMPLETED | OUTPATIENT
Start: 2018-03-01 | End: 2018-03-01

## 2018-03-01 RX ORDER — SODIUM CHLORIDE AND POTASSIUM CHLORIDE 150; 900 MG/100ML; MG/100ML
INJECTION, SOLUTION INTRAVENOUS CONTINUOUS
Status: DISCONTINUED | OUTPATIENT
Start: 2018-03-01 | End: 2018-03-01 | Stop reason: HOSPADM

## 2018-03-01 RX ADMIN — FAMOTIDINE 20 MG: 20 INJECTION, SOLUTION INTRAVENOUS at 14:53

## 2018-03-01 RX ADMIN — POTASSIUM CHLORIDE AND SODIUM CHLORIDE: 900; 150 INJECTION, SOLUTION INTRAVENOUS at 14:50

## 2018-03-01 RX ADMIN — METHYLPREDNISOLONE SODIUM SUCCINATE 118.75 MG: 125 INJECTION, POWDER, FOR SOLUTION INTRAMUSCULAR; INTRAVENOUS at 14:56

## 2018-03-01 NOTE — PROGRESS NOTES
Verified Prednisone taper with Dr. Ghosh.  Patient is to start Prednisone 60 mg tomorrow 03.02.18 and is to decrease by 10 mg every 4 days depending on symptoms.

## 2018-03-01 NOTE — MR AVS SNAPSHOT
After Visit Summary   3/1/2018    Doretha Fernandez    MRN: [id number]           Patient Information     Date Of Birth          1975        Visit Information        Provider Department      3/1/2018 2:30 PM ROOM 10 [facility]        Today's Diagnoses     Colitis    -  1    Metastatic malignant melanoma (H)           Follow-ups after your visit        Your next 10 appointments already scheduled     Mar 02, 2018  2:00 PM CST   Level 1 with ROOM 2 [facility] ([facility])    [facility]  [address]  [facility] [address]   [phone]            Mar 15, 2018  8:30 AM CDT   LAB with [facility] ([facility])    [address] [facility] [address]   [phone]           Please do not eat 10-12 hours before your appointment if you are coming in fasting for labs on lipids, cholesterol, or glucose (sugar). This does not apply to pregnant women. Water, hot tea and black coffee (with nothing added) are okay. Do not drink other fluids, diet soda or chew gum.            Mar 15, 2018  9:00 AM CDT   Return Visit with Kathy Richards MD   [facility] ([facility])    [facility]  [address]  [facility] [address]   [phone]            Mar 15, 2018  9:30 AM CDT   Level 2 with ROOM 1 [facility] ([facility])    [facility]  [address]  [facility] [address]   [phone]            Mar 29, 2018  8:30 AM CDT   Level 2 with ROOM 3 [facility] ([facility])    [facility]  [address]  [facility] [address]   [phone]            Apr 09, 2018  2:30 PM CDT   Return Visit with Lowell Bullock MD   [facility] ([facility])    [address] [facility] [address] [facility]  [id number] [phone]              Who to contact     If you have questions or need follow up information about today's clinic visit or your schedule please contact [facility] directly at [phone].  Normal or non-critical lab and imaging results will be communicated to you by [employer], letter or phone within 4 business days after the clinic has received the results. If you do not hear from us within 7 days, please contact the clinic through [employer] or phone. If you have a critical or abnormal lab result, we will notify you by phone as soon as possible.  Submit refill requests through [employer] or call your pharmacy and they will forward the refill request to us. Please allow 3 business days for your refill to be completed.          Additional Information About Your Visit        [employer] Information     [employer] gives you secure access to your electronic health record. If you see a primary care provider, you can also send messages to your care team and make appointments. If you have questions, please call your primary care clinic.  If you do not have a primary care provider, please call [phone] and they will assist you.        Care EveryWhere ID     This is your Care EveryWhere ID. This could be used by other organizations to access your [place] medical records  [id number]        Your Vitals Were     Pulse Temperature Respirations             82 97.7  F (36.5  C) (Oral) 16          Blood Pressure from Last 3 Encounters:   03/01/18 116/62   02/22/18 (!) 136/91   02/22/18 165/76    Weight from Last 3 Encounters:   02/22/18 97.7 kg (215 lb 4.8 oz)   02/22/18 95.7 kg (211 lb)   02/15/18 98 kg (216 lb 1.6 oz)              We Performed the Following     Comprehensive metabolic panel          Today's Medication Changes          These changes are accurate as of 3/1/18  5:38 PM.  If you have any questions, ask your nurse or doctor.               Start taking these medicines.        Dose/Directions     predniSONE 10 MG tablet   Commonly known as:  DELTASONE   Used for:  Metastatic malignant melanoma (H), Colitis   Started by:  Rossi Ghosh MD        Take 60 mg by mouth daily x 4 days then decrease by 10 mg every 4 days until gone.   Quantity:  84 tablet   Refills:  0            Where to get your medicines      These medications were sent to [facility] [place], [place] - [address]  [address] [facility] [address]     Phone:  [phone]     predniSONE 10 MG tablet                Primary Care Provider Office Phone # Fax #    Anna Naidu [phone] [address]  [facility] [address]        Equal Access to Services     DISHA QUARLES : Hadii aad ku hadasho Soayeshaali, waaxda luqadaha, qaybta kaalmada adeegyada, pancho moore . So [facility] [phone].    ATENCIÓN: Si habla español, tiene a mcdonnell disposición servicios gratuitos de asistencia lingüística. [facility] [phone].    We comply with applicable federal civil rights laws and [place] laws. We do not discriminate on the basis of race, color, national origin, age, disability, sex, sexual orientation, or gender identity.            Thank you!     Thank you for choosing [facility]  for your care. Our goal is always to provide you with excellent care. Hearing back from our patients is one way we can continue to improve our services. Please take a few minutes to complete the written survey that you may receive in the mail after your visit with us. Thank you!             Your Updated Medication List - Protect others around you: Learn how to safely use, store and throw away your medicines at www.disposemymeds.org.          This list is accurate as of 3/1/18  5:38 PM.  Always use your most recent med list.                   Brand Name Dispense Instructions for use Diagnosis    acetaminophen 325 MG tablet    TYLENOL    100 tablet    Take 2 tablets (650  "mg) by mouth every 4 hours as needed for other (mild pain)    Malignant melanoma of left upper extremity including shoulder (H)       aspirin 81 MG EC tablet     90 tablet    Take 1 tablet (81 mg) by mouth daily    Prothrombin mutation (H)       LORazepam 1 MG tablet    ATIVAN    30 tablet    Take 1 tablet (1 mg) by mouth every 8 hours as needed for anxiety    Anxiety, Acute intractable tension-type headache       * order for DME     1 Units    Equipment being ordered: 20-30mmHg compression sleeve and 20-30mmHg compression glove\" x2 pair    Lymphedema of left arm       * order for DME     1 each    Equipment being ordered: x2 Wearease compression shirt    Secondary lymphedema       oxyCODONE IR 5 MG tablet    ROXICODONE    40 tablet    Take 1-3 tablets (5-15 mg) by mouth every 4 hours as needed for pain maximum 12 tablet(s) per day    Cellulitis of chest wall       predniSONE 10 MG tablet    DELTASONE    84 tablet    Take 60 mg by mouth daily x 4 days then decrease by 10 mg every 4 days until gone.    Metastatic malignant melanoma (H), Colitis       rOPINIRole 0.25 MG tablet    REQUIP    30 tablet    Take 1 tablet (0.25 mg) by mouth At Bedtime    Restless leg syndrome       senna-docusate 8.6-50 MG per tablet    SENOKOT-S;PERICOLACE    60 tablet    Take 1 tablet by mouth 2 times daily as needed for constipation    Malignant melanoma of left upper extremity including shoulder (H)       venlafaxine 150 MG Tb24 24 hr tablet    EFFEXOR-ER    30 each    Take 1 tablet (150 mg) by mouth daily (with breakfast)    Mild major depression (H), Diarrhea       Vitamin D3 3000 UNITS Tabs     30 tablet    Take 3,000 Int'l Units by mouth daily    Vitamin D deficiency       zonisamide 25 MG capsule    Zonegran    90 capsule    Take 1 tablet (25 mg) once daily for 1 week, then 2 tablets once daily for 1 week, then 3 tablets once daily for 1 week, then 4 tablets once daily for 1 week.    Headache disorder       * Notice:  This list has " 2 medication(s) that are the same as other medications prescribed for you. Read the directions carefully, and ask your doctor or other care provider to review them with you.

## 2018-03-01 NOTE — PROGRESS NOTES
Infusion Nursing Note:  Doretha Fernandez presents today for IVF, labs.    Patient seen by provider today: No   present during visit today: Not Applicable.    Note: N/A.    Intravenous Access:  Implanted Port.    Treatment Conditions:  Not Applicable.      Post Infusion Assessment:  Patient tolerated infusion without incident.    Discharge Plan:   Patient discharged in stable condition accompanied by: self.  Departure Mode: Ambulatory.    Caro Cabezas RN

## 2018-03-01 NOTE — PROGRESS NOTES
Reviewed results and Dr. Ghosh's recommendations.  Denies questions. Is able to come to clinic today. OK per Diann, Infusion Charge Nurse, for pt to be added to infusion schedule today at 1430 and tomorrow at 1400. Pt aware of times.

## 2018-03-02 ENCOUNTER — INFUSION THERAPY VISIT (OUTPATIENT)
Dept: INFUSION THERAPY | Facility: CLINIC | Age: 43
End: 2018-03-02
Attending: INTERNAL MEDICINE
Payer: COMMERCIAL

## 2018-03-02 VITALS — DIASTOLIC BLOOD PRESSURE: 77 MMHG | SYSTOLIC BLOOD PRESSURE: 125 MMHG | HEART RATE: 79 BPM | TEMPERATURE: 97.4 F

## 2018-03-02 DIAGNOSIS — K52.9 COLITIS: Primary | ICD-10-CM

## 2018-03-02 DIAGNOSIS — C43.9 METASTATIC MALIGNANT MELANOMA (H): ICD-10-CM

## 2018-03-02 PROCEDURE — 96360 HYDRATION IV INFUSION INIT: CPT

## 2018-03-02 PROCEDURE — 25000128 H RX IP 250 OP 636: Performed by: INTERNAL MEDICINE

## 2018-03-02 PROCEDURE — 96361 HYDRATE IV INFUSION ADD-ON: CPT

## 2018-03-02 RX ORDER — SODIUM CHLORIDE AND POTASSIUM CHLORIDE 150; 900 MG/100ML; MG/100ML
INJECTION, SOLUTION INTRAVENOUS CONTINUOUS
Status: DISCONTINUED | OUTPATIENT
Start: 2018-03-02 | End: 2018-03-02 | Stop reason: HOSPADM

## 2018-03-02 RX ORDER — SODIUM CHLORIDE AND POTASSIUM CHLORIDE 150; 900 MG/100ML; MG/100ML
INJECTION, SOLUTION INTRAVENOUS CONTINUOUS
Status: CANCELLED
Start: 2018-03-02

## 2018-03-02 RX ADMIN — POTASSIUM CHLORIDE AND SODIUM CHLORIDE: 900; 150 INJECTION, SOLUTION INTRAVENOUS at 14:32

## 2018-03-02 NOTE — PROGRESS NOTES
Infusion Nursing Note:  Doretha Fernandez presents today for IV hydration.    Patient seen by provider today: No   present during visit today: Not Applicable.    Note: N/A.    Intravenous Access:  Peripheral IV is patent and pt reports no symptoms of irritation.    Treatment Conditions:  Not Applicable.      Post Infusion Assessment:  Patient tolerated infusion without incident.  Access discontinued per protocol.    Discharge Plan:   Patient discharged in stable condition accompanied by: self.  Departure Mode: Ambulatory.  Pt will call on Monday 3/5 for update on her condition and to get a plan from Dr. Richards.    Emilia Perez RN

## 2018-03-02 NOTE — MR AVS SNAPSHOT
After Visit Summary   3/2/2018    Doretha Fernandez    MRN: [id number]           Patient Information     Date Of Birth          1975        Visit Information        Provider Department      3/2/2018 2:00 PM ROOM 2 [facility]        Today's Diagnoses     Colitis    -  1    Metastatic malignant melanoma (H)           Follow-ups after your visit        Your next 10 appointments already scheduled     Mar 15, 2018  8:30 AM CDT   LAB with [facility] ([facility])    [address] [facility] [address]   [phone]           Please do not eat 10-12 hours before your appointment if you are coming in fasting for labs on lipids, cholesterol, or glucose (sugar). This does not apply to pregnant women. Water, hot tea and black coffee (with nothing added) are okay. Do not drink other fluids, diet soda or chew gum.            Mar 15, 2018  9:00 AM CDT   Return Visit with Kathy Richards MD   [facility] ([facility])    [facility]  [address]  [facility] [address]   [phone]            Mar 15, 2018  9:30 AM CDT   Level 2 with ROOM 1 [facility] ([facility])    [facility]  [address]  [facility] [address]   [phone]            Mar 29, 2018  8:30 AM CDT   Level 2 with ROOM 3 [facility] ([facility])    [facility]  [address]  [facility] [address]   [phone]            Apr 09, 2018  2:30 PM CDT   Return Visit with Lowell Bullock MD   [facility]    [address] [facility] [address]   [phone]              Who to contact     If you have questions or need follow up information about today's clinic visit or your schedule please contact [facility] directly at  [phone].  Normal or non-critical lab and imaging results will be communicated to you by MyChart, letter or phone within 4 business days after the clinic has received the results. If you do not hear from us within 7 days, please contact the clinic through [employer] or phone. If you have a critical or abnormal lab result, we will notify you by phone as soon as possible.  Submit refill requests through [employer] or call your pharmacy and they will forward the refill request to us. Please allow 3 business days for your refill to be completed.          Additional Information About Your Visit        [employer] Information     [employer] gives you secure access to your electronic health record. If you see a primary care provider, you can also send messages to your care team and make appointments. If you have questions, please call your primary care clinic.  If you do not have a primary care provider, please call [phone] and they will assist you.        Care EveryWhere ID     This is your Care EveryWhere ID. This could be used by other organizations to access your [place] medical records  [id number]        Your Vitals Were     Pulse Temperature                79 97.4  F (36.3  C) (Oral)           Blood Pressure from Last 3 Encounters:   03/02/18 125/77   03/01/18 116/62   02/22/18 (!) 136/91    Weight from Last 3 Encounters:   02/22/18 97.7 kg (215 lb 4.8 oz)   02/22/18 95.7 kg (211 lb)   02/15/18 98 kg (216 lb 1.6 oz)              Today, you had the following     No orders found for display       Primary Care Provider Office Phone # Fax #    Anna Naidu [phone] [address] [facility] [address]        Equal Access to Services     [facility] : Hadii josy garnett hadsondra Soalphonso, waaxda luqadaha, qaybta kaalmada pancho perez. So [facility] [phone].    ATENCIÓN: Si habla español, tiene a mcdonnell disposición servicios gratuitos de asistencia lingüística.  "Diana al [phone].    We comply with applicable federal civil rights laws and [place] laws. We do not discriminate on the basis of race, color, national origin, age, disability, sex, sexual orientation, or gender identity.            Thank you!     Thank you for choosing [facility]  for your care. Our goal is always to provide you with excellent care. Hearing back from our patients is one way we can continue to improve our services. Please take a few minutes to complete the written survey that you may receive in the mail after your visit with us. Thank you!             Your Updated Medication List - Protect others around you: Learn how to safely use, store and throw away your medicines at www.disposemymeds.org.          This list is accurate as of 3/2/18  4:34 PM.  Always use your most recent med list.                   Brand Name Dispense Instructions for use Diagnosis    acetaminophen 325 MG tablet    TYLENOL    100 tablet    Take 2 tablets (650 mg) by mouth every 4 hours as needed for other (mild pain)    Malignant melanoma of left upper extremity including shoulder (H)       aspirin 81 MG EC tablet     90 tablet    Take 1 tablet (81 mg) by mouth daily    Prothrombin mutation (H)       LORazepam 1 MG tablet    ATIVAN    30 tablet    Take 1 tablet (1 mg) by mouth every 8 hours as needed for anxiety    Anxiety, Acute intractable tension-type headache       * order for DME     1 Units    Equipment being ordered: 20-30mmHg compression sleeve and 20-30mmHg compression glove\" x2 pair    Lymphedema of left arm       * order for DME     1 each    Equipment being ordered: x2 Wearease compression shirt    Secondary lymphedema       oxyCODONE IR 5 MG tablet    ROXICODONE    40 tablet    Take 1-3 tablets (5-15 mg) by mouth every 4 hours as needed for pain maximum 12 tablet(s) per day    Cellulitis of chest wall       predniSONE 10 MG tablet    DELTASONE    84 tablet    Take 60 mg by mouth daily x 4 days then " decrease by 10 mg every 4 days until gone.    Metastatic malignant melanoma (H), Colitis       rOPINIRole 0.25 MG tablet    REQUIP    30 tablet    Take 1 tablet (0.25 mg) by mouth At Bedtime    Restless leg syndrome       senna-docusate 8.6-50 MG per tablet    SENOKOT-S;PERICOLACE    60 tablet    Take 1 tablet by mouth 2 times daily as needed for constipation    Malignant melanoma of left upper extremity including shoulder (H)       venlafaxine 150 MG Tb24 24 hr tablet    EFFEXOR-ER    30 each    Take 1 tablet (150 mg) by mouth daily (with breakfast)    Mild major depression (H), Diarrhea       Vitamin D3 3000 UNITS Tabs     30 tablet    Take 3,000 Int'l Units by mouth daily    Vitamin D deficiency       zonisamide 25 MG capsule    Zonegran    90 capsule    Take 1 tablet (25 mg) once daily for 1 week, then 2 tablets once daily for 1 week, then 3 tablets once daily for 1 week, then 4 tablets once daily for 1 week.    Headache disorder       * Notice:  This list has 2 medication(s) that are the same as other medications prescribed for you. Read the directions carefully, and ask your doctor or other care provider to review them with you.

## 2018-03-03 ENCOUNTER — HOSPITAL ENCOUNTER (EMERGENCY)
Facility: CLINIC | Age: 43
Discharge: HOME | End: 2018-03-03
Attending: EMERGENCY MEDICINE | Admitting: EMERGENCY MEDICINE
Payer: COMMERCIAL

## 2018-03-03 VITALS
TEMPERATURE: 97.2 F | BODY MASS INDEX: 37.74 KG/M2 | SYSTOLIC BLOOD PRESSURE: 129 MMHG | WEIGHT: 213 LBS | OXYGEN SATURATION: 99 % | DIASTOLIC BLOOD PRESSURE: 79 MMHG | HEART RATE: 83 BPM | RESPIRATION RATE: 18 BRPM

## 2018-03-03 DIAGNOSIS — A07.1 GIARDIASIS: ICD-10-CM

## 2018-03-03 DIAGNOSIS — K52.9 COLITIS: ICD-10-CM

## 2018-03-03 LAB
ALBUMIN SERPL-MCNC: 3.5 G/DL (ref 3.4–5)
ALP SERPL-CCNC: 63 U/L (ref 40–150)
ALT SERPL W P-5'-P-CCNC: 32 U/L (ref 0–50)
ANION GAP SERPL CALCULATED.3IONS-SCNC: 7 MMOL/L (ref 3–14)
AST SERPL W P-5'-P-CCNC: 17 U/L (ref 0–45)
BASOPHILS # BLD AUTO: 0 10E9/L (ref 0–0.2)
BASOPHILS NFR BLD AUTO: 0.2 %
BILIRUB SERPL-MCNC: 0.1 MG/DL (ref 0.2–1.3)
BUN SERPL-MCNC: 13 MG/DL (ref 7–30)
CALCIUM SERPL-MCNC: 8.5 MG/DL (ref 8.5–10.1)
CHLORIDE SERPL-SCNC: 109 MMOL/L (ref 94–109)
CO2 SERPL-SCNC: 26 MMOL/L (ref 20–32)
CREAT SERPL-MCNC: 0.69 MG/DL (ref 0.52–1.04)
DIFFERENTIAL METHOD BLD: ABNORMAL
EOSINOPHIL # BLD AUTO: 0 10E9/L (ref 0–0.7)
EOSINOPHIL NFR BLD AUTO: 0.2 %
ERYTHROCYTE [DISTWIDTH] IN BLOOD BY AUTOMATED COUNT: 13.8 % (ref 10–15)
GFR SERPL CREATININE-BSD FRML MDRD: >90 ML/MIN/1.7M2
GLUCOSE SERPL-MCNC: 118 MG/DL (ref 70–99)
HCT VFR BLD AUTO: 36.8 % (ref 35–47)
HGB BLD-MCNC: 12 G/DL (ref 11.7–15.7)
IMM GRANULOCYTES # BLD: 0.2 10E9/L (ref 0–0.4)
IMM GRANULOCYTES NFR BLD: 1.2 %
LYMPHOCYTES # BLD AUTO: 3.5 10E9/L (ref 0.8–5.3)
LYMPHOCYTES NFR BLD AUTO: 27.6 %
MCH RBC QN AUTO: 28 PG (ref 26.5–33)
MCHC RBC AUTO-ENTMCNC: 32.6 G/DL (ref 31.5–36.5)
MCV RBC AUTO: 86 FL (ref 78–100)
MONOCYTES # BLD AUTO: 0.9 10E9/L (ref 0–1.3)
MONOCYTES NFR BLD AUTO: 6.7 %
NEUTROPHILS # BLD AUTO: 8.2 10E9/L (ref 1.6–8.3)
NEUTROPHILS NFR BLD AUTO: 64.1 %
PLATELET # BLD AUTO: 371 10E9/L (ref 150–450)
POTASSIUM SERPL-SCNC: 3.4 MMOL/L (ref 3.4–5.3)
PROT SERPL-MCNC: 7.3 G/DL (ref 6.8–8.8)
RBC # BLD AUTO: 4.29 10E12/L (ref 3.8–5.2)
SODIUM SERPL-SCNC: 142 MMOL/L (ref 133–144)
WBC # BLD AUTO: 12.8 10E9/L (ref 4–11)

## 2018-03-03 PROCEDURE — 87177 OVA AND PARASITES SMEARS: CPT | Performed by: EMERGENCY MEDICINE

## 2018-03-03 PROCEDURE — 87329 GIARDIA AG IA: CPT | Performed by: EMERGENCY MEDICINE

## 2018-03-03 PROCEDURE — 96360 HYDRATION IV INFUSION INIT: CPT | Performed by: EMERGENCY MEDICINE

## 2018-03-03 PROCEDURE — 99284 EMERGENCY DEPT VISIT MOD MDM: CPT | Mod: 25 | Performed by: EMERGENCY MEDICINE

## 2018-03-03 PROCEDURE — 25000128 H RX IP 250 OP 636: Performed by: EMERGENCY MEDICINE

## 2018-03-03 PROCEDURE — 87209 SMEAR COMPLEX STAIN: CPT | Performed by: EMERGENCY MEDICINE

## 2018-03-03 PROCEDURE — 99284 EMERGENCY DEPT VISIT MOD MDM: CPT | Mod: Z6 | Performed by: EMERGENCY MEDICINE

## 2018-03-03 PROCEDURE — 85025 COMPLETE CBC W/AUTO DIFF WBC: CPT | Performed by: EMERGENCY MEDICINE

## 2018-03-03 PROCEDURE — 80053 COMPREHEN METABOLIC PANEL: CPT | Performed by: EMERGENCY MEDICINE

## 2018-03-03 PROCEDURE — 87506 IADNA-DNA/RNA PROBE TQ 6-11: CPT | Performed by: EMERGENCY MEDICINE

## 2018-03-03 RX ORDER — METRONIDAZOLE 500 MG/1
500 TABLET ORAL 2 TIMES DAILY
Qty: 14 TABLET | Refills: 0 | Status: ON HOLD | OUTPATIENT
Start: 2018-03-03 | End: 2018-03-08

## 2018-03-03 RX ADMIN — SODIUM CHLORIDE, POTASSIUM CHLORIDE, SODIUM LACTATE AND CALCIUM CHLORIDE 1000 ML: 600; 310; 30; 20 INJECTION, SOLUTION INTRAVENOUS at 09:14

## 2018-03-03 NOTE — ED PROVIDER NOTES
History     Chief Complaint   Patient presents with     Diarrhea     HPI  Doretha Fernandez is a 43 year old female who has a history of dysfunctional uterine bleeding, GERD, prothrombin mutation, metastatic malignant melanoma, sepsis due to cellulitis, stroke, depression and anxiety who presents to the ED for evaluation of diarrhea. Patient reports that she has had diarrhea for the last two weeks with blood and purulent discharge in her loose stools. She was seen in clinic on 2/22 by Dr. Richards, and C diff test was negative. Patient had parasite exam and had a positive Giardia lamblia specific antigen. Patient was treated with tinidazole x single dose therapy. Patient continued to have symptoms. She had a repeat stool sample on 2/28/2018, and Giardia antigen was negative. She did have a positive fecal lactoferrin.She was prescribed her prednisone. In the ED today, she reports that she continues to have bloody and purulent stools. She states that she barely has any regular stool, and it is mostly all blood and purulent drainage. Patient reports that she has some mild lower abdominal cramping pain as well. No F/C/V.  She is on Opdivo for melanoma. She notes that she has had IV fluids the last three days without alleviation.     Problem List:    Patient Active Problem List    Diagnosis Date Noted     Colitis 03/01/2018     Priority: Medium     Functional diarrhea 02/22/2018     Priority: Medium     Sepsis due to cellulitis (H) 11/14/2017     Priority: Medium     Melanoma (H) 10/23/2017     Priority: Medium     Malignant melanoma of left upper extremity including shoulder (H) 10/12/2017     Priority: Medium     Metastatic malignant melanoma (H) 10/10/2017     Priority: Medium     Prothrombin mutation (H) 04/05/2017     Priority: Medium     On daily aspirin 81 mg per hematology's recommendations from 2008       Vision changes 04/01/2017     Priority: Medium     Acute non intractable tension-type headache 04/01/2017      Priority: Medium     Mild intermittent asthma without complication 2013     Priority: Medium     Mild major depression (H) 2013     Priority: Medium     Anxiety state 2012     Priority: Medium     Problem list name updated by automated process. Provider to review       Esophageal reflux 2012     Priority: Medium     DUB (dysfunctional uterine bleeding) 2011     Priority: Medium     CARDIOVASCULAR SCREENING; LDL GOAL LESS THAN 160 2011     Priority: Low        Past Medical History:    Past Medical History:   Diagnosis Date     Abnormal MRI      Anxiety      Depression      Lumbago      Malignant melanoma (H)      Mild persistent asthma      Prothrombin deficiency (H)      Stroke (cerebrum) (H)      TIA (transient ischemic attack)        Past Surgical History:    Past Surgical History:   Procedure Laterality Date     COLONOSCOPY N/A 10/18/2017    Procedure: COLONOSCOPY;  Colon;  Surgeon: Debbie Stephens MD;  Location: [facility] OR     DISSECT LYMPH NODE AXILLA Left 10/23/2017    Procedure: DISSECT LYMPH NODE AXILLA;  Left Axillary Lymph Node Dissection ;  Surgeon: Laurent Cool MD;  Location: [facility] OR     GYN SURGERY           REPAIR MOHS Left 2017    Procedure: REPAIR MOHS;  Left Upper Lid Moh's Reconstruction;  Surgeon: Kisha Bosch MD;  Location: [facility] OR       Family History:    Family History   Problem Relation Age of Onset     CANCER Mother 45     lung     Neurologic Disorder Mother      Lipids Father      GASTROINTESTINAL DISEASE Father      Depression Father      CANCER Maternal Grandmother      Blood Disease Maternal Grandmother      Arthritis Maternal Grandmother      DIABETES Maternal Grandmother      Depression Maternal Grandmother      Macular Degeneration Maternal Grandmother      Glaucoma Maternal Grandmother      DIABETES Maternal Grandfather      CEREBROVASCULAR DISEASE Maternal Grandfather      Blood Disease Maternal Grandfather      HEART DISEASE  Maternal Grandfather      Glaucoma Maternal Grandfather      CANCER Paternal Grandmother      Cancer - colorectal Paternal Grandmother      Respiratory Paternal Grandfather      Blood Disease Paternal Grandfather      HEART DISEASE Daughter      Asthma Daughter      Depression Sister        Social History:  Marital Status:   [4]  Social History   Substance Use Topics     Smoking status: Passive Smoke Exposure - Never Smoker     Last attempt to quit: 3/20/1998     Smokeless tobacco: Never Used     Alcohol use No      Comment: occ        Medications:      metroNIDAZOLE (FLAGYL) 500 MG tablet   Acetaminophen (TYLENOL PO)   predniSONE (DELTASONE) 10 MG tablet   venlafaxine (EFFEXOR-ER) 150 MG TB24 24 hr tablet   aspirin 81 MG EC tablet   order for DME   order for DME   Cholecalciferol (VITAMIN D3) 3000 UNITS TABS   rOPINIRole (REQUIP) 0.25 MG tablet   oxyCODONE IR (ROXICODONE) 5 MG tablet   zonisamide (ZONEGRAN) 25 MG capsule        Allergies   Allergen Reactions     Compazine [Prochlorperazine] Fatigue     Fentanyl Other (See Comments)     sweating     Erythrocin Nausea and Vomiting     Zithromax [Azithromycin Dihydrate] Diarrhea       Review of Systems  As mentioned above in the history present illness. All other systems were reviewed and are negative.    Physical Exam   BP: 129/79  Pulse: 83  Temp: 97.2  F (36.2  C)  Resp: 18  Weight: 96.6 kg (213 lb)  SpO2: 99 %      Physical Exam   Constitutional: She is oriented to person, place, and time. She appears well-developed and well-nourished. No distress.   HENT:   Head: Normocephalic and atraumatic.   Mouth/Throat: Oropharynx is clear and moist.   Eyes: Conjunctivae and EOM are normal. No scleral icterus.   Neck: Normal range of motion. Neck supple. No tracheal deviation present.   Cardiovascular: Normal rate, regular rhythm and normal heart sounds.  Exam reveals no gallop and no friction rub.    No murmur heard.  Pulmonary/Chest: Effort normal and breath sounds  normal. No respiratory distress. She has no wheezes. She has no rales.   Abdominal: Soft. Bowel sounds are normal. She exhibits no distension. There is no tenderness. There is no rebound and no guarding.   Musculoskeletal: Normal range of motion. She exhibits no edema.   Neurological: She is alert and oriented to person, place, and time.   Skin: Skin is warm and dry. No rash noted. She is not diaphoretic. No erythema. No pallor.   Psychiatric: Her behavior is normal.   Anxious affect.   Nursing note and vitals reviewed.      ED Course     ED Course     Procedures          Results for orders placed or performed during the hospital encounter of 03/03/18   CBC with platelets, differential   Result Value Ref Range    WBC 12.8 (H) 4.0 - 11.0 10e9/L    RBC Count 4.29 3.8 - 5.2 10e12/L    Hemoglobin 12.0 11.7 - 15.7 g/dL    Hematocrit 36.8 35.0 - 47.0 %    MCV 86 78 - 100 fl    MCH 28.0 26.5 - 33.0 pg    MCHC 32.6 31.5 - 36.5 g/dL    RDW 13.8 10.0 - 15.0 %    Platelet Count 371 150 - 450 10e9/L    Diff Method Automated Method     % Neutrophils 64.1 %    % Lymphocytes 27.6 %    % Monocytes 6.7 %    % Eosinophils 0.2 %    % Basophils 0.2 %    % Immature Granulocytes 1.2 %    Absolute Neutrophil 8.2 1.6 - 8.3 10e9/L    Absolute Lymphocytes 3.5 0.8 - 5.3 10e9/L    Absolute Monocytes 0.9 0.0 - 1.3 10e9/L    Absolute Eosinophils 0.0 0.0 - 0.7 10e9/L    Absolute Basophils 0.0 0.0 - 0.2 10e9/L    Abs Immature Granulocytes 0.2 0 - 0.4 10e9/L   Comprehensive metabolic panel   Result Value Ref Range    Sodium 142 133 - 144 mmol/L    Potassium 3.4 3.4 - 5.3 mmol/L    Chloride 109 94 - 109 mmol/L    Carbon Dioxide 26 20 - 32 mmol/L    Anion Gap 7 3 - 14 mmol/L    Glucose 118 (H) 70 - 99 mg/dL    Urea Nitrogen 13 7 - 30 mg/dL    Creatinine 0.69 0.52 - 1.04 mg/dL    GFR Estimate >90 >60 mL/min/1.7m2    GFR Estimate If Black >90 >60 mL/min/1.7m2    Calcium 8.5 8.5 - 10.1 mg/dL    Bilirubin Total 0.1 (L) 0.2 - 1.3 mg/dL    Albumin 3.5 3.4  - 5.0 g/dL    Protein Total 7.3 6.8 - 8.8 g/dL    Alkaline Phosphatase 63 40 - 150 U/L    ALT 32 0 - 50 U/L    AST 17 0 - 45 U/L          Medications   lactated ringers BOLUS 1,000 mL (0 mLs Intravenous Stopped 3/3/18 1031)     11:53 AM She provided stool sample for re-evaluation of C. difficile. She will be sent home with stool collection container.     Assessments & Plan (with Medical Decision Making)   Recent dx of Giardiasis with persist sx after tx with Tinidazole. Abd benign no F/C/V.  She was given IVF. Will assume continuing Giardiasis, rx Flagyl, send stool for eval again. Patient was provided instructions for supportive care and will return as needed for worsened condition or worsening symptoms, or new problems or concerns.      I have reviewed the nursing notes.    I have reviewed the findings, diagnosis, plan and need for follow up with the patient.    New Prescriptions    METRONIDAZOLE (FLAGYL) 500 MG TABLET    Take 1 tablet (500 mg) by mouth 2 times daily for 7 days       Final diagnoses:   Giardiasis   Colitis     This document serves as a record of the services and decisions personally performed and made by Clifford Soliz MD. It was created on HIS/HER behalf by   Arminda Miller, a trained medical scribe. The creation of this document is based the provider's statements to the medical scribe.  Arminda Miller 8:35 AM 3/3/2018    Provider:   The information in this document, created by the medical scribe for me, accurately reflects the services I personally performed and the decisions made by me. I have reviewed and approved this document for accuracy prior to leaving the patient care area.  Clifford Soliz MD 8:35 AM 3/3/2018    3/3/2018   [facility] EMERGENCY DEPARTMENT     Clifford Soliz MD  03/09/18 7131

## 2018-03-03 NOTE — ED AVS SNAPSHOT
[facility] Emergency Department    [address] [address]    Phone:  [phone]    Fax:  [phone]                                       Doretha Fernandez   MRN: [id number]    Department:  [facility] Emergency Department   Date of Visit:  3/3/2018           After Visit Summary Signature Page     I have received my discharge instructions, and my questions have been answered. I have discussed any challenges I see with this plan with the nurse or doctor.    ..........................................................................................................................................  Patient/Patient Representative Signature      ..........................................................................................................................................  Patient Representative Print Name and Relationship to Patient    ..................................................               ................................................  Date                                            Time    ..........................................................................................................................................  Reviewed by Signature/Title    ...................................................              ..............................................  Date                                                            Time

## 2018-03-03 NOTE — ED AVS SNAPSHOT
[facility] Emergency Department    [address]    [facility] [place] [address]    Phone:  [phone]    Fax:  [phone]                                       Doretha Fernandez   MRN: [id number]    Department:  [facility] Emergency Department   Date of Visit:  3/3/2018           Patient Information     Date Of Birth          1975        Your diagnoses for this visit were:     Giardiasis     Colitis        You were seen by Clifford Soliz MD.      Follow-up Information     Follow up with Rossi Ghosh MD In 2 days.    Specialties:  Oncology, Hematology    Contact information:    [address]  [place] [address]  [phone]        Discharge References/Attachments     FOOD POISONING OR GASTROENTERITIS (ADULT) (ENGLISH)    GIARDIASIS (ENGLISH)      Future Appointments        Provider Department Dept Phone Center    3/15/2018 8:30 AM [facility] [phone] [facility]    3/15/2018 9:00 AM Kathy Richards MD [facility] [phone] [facility]    3/15/2018 9:30 AM [place] [facility] [phone] [facility]    3/29/2018 8:30 AM [place] chemo therapy [facility] [phone] [facility]    4/9/2018 2:30 PM Lowell Bullock MD [facility] [phone] [facility]      24 Hour Appointment Hotline       To make an appointment at any [facility], call [phone]). If you don't have a family doctor or clinic, we will help you find one. [facility] are conveniently located to serve the needs of you and your family.          ED Discharge Orders     Clostridium difficile toxin B PCR                    Review of your medicines      START taking        Dose / Directions Last dose taken    metroNIDAZOLE 500 MG tablet   Commonly known as:  FLAGYL   Dose:  500 mg   Quantity:  14 tablet        Take 1 tablet (500 mg) by mouth 2 times daily for 7 days   Refills:  0          Our records  "show that you are taking the medicines listed below. If these are incorrect, please call your family doctor or clinic.        Dose / Directions Last dose taken    aspirin 81 MG EC tablet   Dose:  81 mg   Quantity:  90 tablet        Take 1 tablet (81 mg) by mouth daily   Refills:  3        * order for DME   Quantity:  1 Units        Equipment being ordered: 20-30mmHg compression sleeve and 20-30mmHg compression glove\" x2 pair   Refills:  0        * order for DME   Quantity:  1 each        Equipment being ordered: x2 Wearease compression shirt   Refills:  0        oxyCODONE IR 5 MG tablet   Commonly known as:  ROXICODONE   Dose:  5-15 mg   Quantity:  40 tablet        Take 1-3 tablets (5-15 mg) by mouth every 4 hours as needed for pain maximum 12 tablet(s) per day   Refills:  0        predniSONE 10 MG tablet   Commonly known as:  DELTASONE   Quantity:  84 tablet        Take 60 mg by mouth daily x 4 days then decrease by 10 mg every 4 days until gone.   Refills:  0        rOPINIRole 0.25 MG tablet   Commonly known as:  REQUIP   Dose:  0.25 mg   Quantity:  30 tablet        Take 1 tablet (0.25 mg) by mouth At Bedtime   Refills:  11        TYLENOL PO   Dose:  1000 mg        Take 1,000 mg by mouth every 8 hours as needed for mild pain or fever   Refills:  0        venlafaxine 150 MG Tb24 24 hr tablet   Commonly known as:  EFFEXOR-ER   Dose:  150 mg   Quantity:  30 each        Take 1 tablet (150 mg) by mouth daily (with breakfast)   Refills:  0        Vitamin D3 3000 UNITS Tabs   Dose:  3000 Int'l Units   Quantity:  30 tablet        Take 3,000 Int'l Units by mouth daily   Refills:  3        zonisamide 25 MG capsule   Commonly known as:  Zonegran   Quantity:  90 capsule        Take 1 tablet (25 mg) once daily for 1 week, then 2 tablets once daily for 1 week, then 3 tablets once daily for 1 week, then 4 tablets once daily for 1 week.   Refills:  1        * Notice:  This list has 2 medication(s) that are the same as other " medications prescribed for you. Read the directions carefully, and ask your doctor or other care provider to review them with you.            Prescriptions were sent or printed at these locations (1 Prescription)                   [facility] [place] - [place], [place] - [address]   [address] [address]    Telephone:  [phone]   Fax:  [phone]   Hours:                  E-Prescribed (1 of 1)         metroNIDAZOLE (FLAGYL) 500 MG tablet                Procedures and tests performed during your visit     CBC with platelets, differential    Comprehensive metabolic panel    Enteric Bacteria and Virus Panel by ZENA Stool    Giardia antigen    Ova and Parasite Exam Routine      Orders Needing Specimen Collection     Ordered          03/03/18 0900  Clostridium difficile toxin B PCR - ROUTINE, Prio: Routine, Needs to be Collected     Scheduled Task Status   03/03/18 0900 Collect Clostridium difficile toxin B PCR Open   Order Class:  PCU Collect                  Pending Results     Date and Time Order Name Status Description    3/3/2018 1104 Giardia antigen In process     3/3/2018 0900 Enteric Bacteria and Virus Panel by ZENA Stool In process     3/3/2018 0900 Ova and Parasite Exam Routine In process             Pending Culture Results     Date and Time Order Name Status Description    3/3/2018 1104 Giardia antigen In process     3/3/2018 0900 Enteric Bacteria and Virus Panel by ZENA Stool In process     3/3/2018 0900 Ova and Parasite Exam Routine In process             Pending Results Instructions     If you had any lab results that were not finalized at the time of your Discharge, you can call the ED Lab Result RN at [phone]. You will be contacted by this team for any positive Lab results or changes in treatment. The nurses are available 7 days a week from 10A to 6:30P.  You can leave a message 24 hours per day and they will return your call.        Test Results From Your Hospital Stay         3/3/2018  8:39 AM      Component Results     Component Value Ref Range & Units Status    WBC 12.8 (H) 4.0 - 11.0 10e9/L Final    RBC Count 4.29 3.8 - 5.2 10e12/L Final    Hemoglobin 12.0 11.7 - 15.7 g/dL Final    Hematocrit 36.8 35.0 - 47.0 % Final    MCV 86 78 - 100 fl Final    MCH 28.0 26.5 - 33.0 pg Final    MCHC 32.6 31.5 - 36.5 g/dL Final    RDW 13.8 10.0 - 15.0 % Final    Platelet Count 371 150 - 450 10e9/L Final    Diff Method Automated Method  Final    % Neutrophils 64.1 % Final    % Lymphocytes 27.6 % Final    % Monocytes 6.7 % Final    % Eosinophils 0.2 % Final    % Basophils 0.2 % Final    % Immature Granulocytes 1.2 % Final    Absolute Neutrophil 8.2 1.6 - 8.3 10e9/L Final    Absolute Lymphocytes 3.5 0.8 - 5.3 10e9/L Final    Absolute Monocytes 0.9 0.0 - 1.3 10e9/L Final    Absolute Eosinophils 0.0 0.0 - 0.7 10e9/L Final    Absolute Basophils 0.0 0.0 - 0.2 10e9/L Final    Abs Immature Granulocytes 0.2 0 - 0.4 10e9/L Final         3/3/2018  8:45 AM      Component Results     Component Value Ref Range & Units Status    Sodium 142 133 - 144 mmol/L Final    Potassium 3.4 3.4 - 5.3 mmol/L Final    Chloride 109 94 - 109 mmol/L Final    Carbon Dioxide 26 20 - 32 mmol/L Final    Anion Gap 7 3 - 14 mmol/L Final    Glucose 118 (H) 70 - 99 mg/dL Final    Urea Nitrogen 13 7 - 30 mg/dL Final    Creatinine 0.69 0.52 - 1.04 mg/dL Final    GFR Estimate >90 >60 mL/min/1.7m2 Final    Non  GFR Calc    GFR Estimate If Black >90 >60 mL/min/1.7m2 Final    African American GFR Calc    Calcium 8.5 8.5 - 10.1 mg/dL Final    Bilirubin Total 0.1 (L) 0.2 - 1.3 mg/dL Final    Albumin 3.5 3.4 - 5.0 g/dL Final    Protein Total 7.3 6.8 - 8.8 g/dL Final    Alkaline Phosphatase 63 40 - 150 U/L Final    ALT 32 0 - 50 U/L Final    AST 17 0 - 45 U/L Final         3/3/2018 11:35 AM         3/3/2018 11:36 AM         3/3/2018 11:35 AM                Thank you for choosing [facility]       Thank you for choosing [facility] for  your care. Our goal is always to provide you with excellent care. Hearing back from our patients is one way we can continue to improve our services. Please take a few minutes to complete the written survey that you may receive in the mail after you visit with us. Thank you!        [facility] Information     [employer] gives you secure access to your electronic health record. If you see a primary care provider, you can also send messages to your care team and make appointments. If you have questions, please call your primary care clinic.  If you do not have a primary care provider, please call [phone] and they will assist you.        Care EveryWhere ID     This is your Care EveryWhere ID. This could be used by other organizations to access your [facility] medical records  [id number]        Equal Access to Services     BRIGETTE QUARLES : Nicol Gustafson, melba valencia, paulina perez, pancho castillo. So [facility] [phone].    ATENCIÓN: Si habla español, tiene a mcdonnell disposición servicios gratuitos de asistencia lingüística. Llame al [phone].    We comply with applicable federal civil rights laws and [place] laws. We do not discriminate on the basis of race, color, national origin, age, disability, sex, sexual orientation, or gender identity.            After Visit Summary       This is your record. Keep this with you and show to your community pharmacist(s) and doctor(s) at your next visit.

## 2018-03-04 ENCOUNTER — HOSPITAL ENCOUNTER (INPATIENT)
Facility: CLINIC | Age: 43
LOS: 4 days | Discharge: ANOTHER HOSPITAL | DRG: 372 | End: 2018-03-08
Attending: FAMILY MEDICINE | Admitting: INTERNAL MEDICINE
Payer: COMMERCIAL

## 2018-03-04 ENCOUNTER — APPOINTMENT (OUTPATIENT)
Dept: CT IMAGING | Facility: CLINIC | Age: 43
DRG: 372 | End: 2018-03-04
Attending: FAMILY MEDICINE
Payer: COMMERCIAL

## 2018-03-04 DIAGNOSIS — A07.1 GIARDIASIS: ICD-10-CM

## 2018-03-04 DIAGNOSIS — R10.9 ABDOMINAL PAIN, UNSPECIFIED ABDOMINAL LOCATION: ICD-10-CM

## 2018-03-04 DIAGNOSIS — K62.5 HEMORRHAGE OF RECTUM AND ANUS: ICD-10-CM

## 2018-03-04 DIAGNOSIS — K52.9 COLITIS: ICD-10-CM

## 2018-03-04 DIAGNOSIS — A07.1 INTESTINAL GIARDIASIS: Primary | ICD-10-CM

## 2018-03-04 DIAGNOSIS — R19.7 DIARRHEA, UNSPECIFIED TYPE: ICD-10-CM

## 2018-03-04 PROBLEM — C43.9 METASTATIC MALIGNANT MELANOMA (H): Status: ACTIVE | Noted: 2017-10-10

## 2018-03-04 LAB
ALBUMIN SERPL-MCNC: 3.3 G/DL (ref 3.4–5)
ALP SERPL-CCNC: 65 U/L (ref 40–150)
ALT SERPL W P-5'-P-CCNC: 60 U/L (ref 0–50)
ANION GAP SERPL CALCULATED.3IONS-SCNC: 5 MMOL/L (ref 3–14)
AST SERPL W P-5'-P-CCNC: 39 U/L (ref 0–45)
BASOPHILS # BLD AUTO: 0 10E9/L (ref 0–0.2)
BASOPHILS NFR BLD AUTO: 0.4 %
BILIRUB SERPL-MCNC: 0.1 MG/DL (ref 0.2–1.3)
BUN SERPL-MCNC: 10 MG/DL (ref 7–30)
C COLI+JEJUNI+LARI FUSA STL QL NAA+PROBE: NOT DETECTED
C DIFF TOX B STL QL: NEGATIVE
CALCIUM SERPL-MCNC: 8.3 MG/DL (ref 8.5–10.1)
CHLORIDE SERPL-SCNC: 106 MMOL/L (ref 94–109)
CO2 SERPL-SCNC: 28 MMOL/L (ref 20–32)
CREAT SERPL-MCNC: 0.62 MG/DL (ref 0.52–1.04)
DIFFERENTIAL METHOD BLD: ABNORMAL
EC STX1 GENE STL QL NAA+PROBE: NOT DETECTED
EC STX2 GENE STL QL NAA+PROBE: NOT DETECTED
ENTERIC PATHOGEN COMMENT: NORMAL
EOSINOPHIL # BLD AUTO: 0.1 10E9/L (ref 0–0.7)
EOSINOPHIL NFR BLD AUTO: 1.2 %
ERYTHROCYTE [DISTWIDTH] IN BLOOD BY AUTOMATED COUNT: 13.8 % (ref 10–15)
GFR SERPL CREATININE-BSD FRML MDRD: >90 ML/MIN/1.7M2
GLUCOSE SERPL-MCNC: 67 MG/DL (ref 70–99)
HCT VFR BLD AUTO: 36 % (ref 35–47)
HGB BLD-MCNC: 12 G/DL (ref 11.7–15.7)
IMM GRANULOCYTES # BLD: 0.1 10E9/L (ref 0–0.4)
IMM GRANULOCYTES NFR BLD: 0.9 %
LYMPHOCYTES # BLD AUTO: 2.9 10E9/L (ref 0.8–5.3)
LYMPHOCYTES NFR BLD AUTO: 25.7 %
MCH RBC QN AUTO: 28 PG (ref 26.5–33)
MCHC RBC AUTO-ENTMCNC: 33.3 G/DL (ref 31.5–36.5)
MCV RBC AUTO: 84 FL (ref 78–100)
MONOCYTES # BLD AUTO: 0.9 10E9/L (ref 0–1.3)
MONOCYTES NFR BLD AUTO: 8.1 %
NEUTROPHILS # BLD AUTO: 7.2 10E9/L (ref 1.6–8.3)
NEUTROPHILS NFR BLD AUTO: 63.7 %
NOROV GI+II ORF1-ORF2 JNC STL QL NAA+PR: NOT DETECTED
PLATELET # BLD AUTO: 364 10E9/L (ref 150–450)
POTASSIUM SERPL-SCNC: 3.8 MMOL/L (ref 3.4–5.3)
PROT SERPL-MCNC: 7 G/DL (ref 6.8–8.8)
RBC # BLD AUTO: 4.28 10E12/L (ref 3.8–5.2)
RVA NSP5 STL QL NAA+PROBE: NOT DETECTED
SALMONELLA SP RPOD STL QL NAA+PROBE: NOT DETECTED
SHIGELLA SP+EIEC IPAH STL QL NAA+PROBE: NOT DETECTED
SODIUM SERPL-SCNC: 139 MMOL/L (ref 133–144)
SPECIMEN SOURCE: NORMAL
V CHOL+PARA RFBL+TRKH+TNAA STL QL NAA+PR: NOT DETECTED
WBC # BLD AUTO: 11.2 10E9/L (ref 4–11)
Y ENTERO RECN STL QL NAA+PROBE: NOT DETECTED

## 2018-03-04 PROCEDURE — 87493 C DIFF AMPLIFIED PROBE: CPT | Performed by: EMERGENCY MEDICINE

## 2018-03-04 PROCEDURE — 25000125 ZZHC RX 250: Performed by: PHYSICIAN ASSISTANT

## 2018-03-04 PROCEDURE — 99223 1ST HOSP IP/OBS HIGH 75: CPT | Mod: AI | Performed by: PHYSICIAN ASSISTANT

## 2018-03-04 PROCEDURE — 25000128 H RX IP 250 OP 636: Performed by: PHYSICIAN ASSISTANT

## 2018-03-04 PROCEDURE — 99284 EMERGENCY DEPT VISIT MOD MDM: CPT | Mod: 25 | Performed by: FAMILY MEDICINE

## 2018-03-04 PROCEDURE — 25000132 ZZH RX MED GY IP 250 OP 250 PS 637: Performed by: PHYSICIAN ASSISTANT

## 2018-03-04 PROCEDURE — 25000128 H RX IP 250 OP 636: Performed by: FAMILY MEDICINE

## 2018-03-04 PROCEDURE — 96374 THER/PROPH/DIAG INJ IV PUSH: CPT | Performed by: FAMILY MEDICINE

## 2018-03-04 PROCEDURE — 96375 TX/PRO/DX INJ NEW DRUG ADDON: CPT | Performed by: FAMILY MEDICINE

## 2018-03-04 PROCEDURE — 25000125 ZZHC RX 250: Performed by: FAMILY MEDICINE

## 2018-03-04 PROCEDURE — 96361 HYDRATE IV INFUSION ADD-ON: CPT | Performed by: FAMILY MEDICINE

## 2018-03-04 PROCEDURE — 12000000 ZZH R&B MED SURG/OB

## 2018-03-04 PROCEDURE — 80053 COMPREHEN METABOLIC PANEL: CPT | Performed by: FAMILY MEDICINE

## 2018-03-04 PROCEDURE — 74177 CT ABD & PELVIS W/CONTRAST: CPT

## 2018-03-04 PROCEDURE — 99285 EMERGENCY DEPT VISIT HI MDM: CPT | Mod: 25 | Performed by: FAMILY MEDICINE

## 2018-03-04 PROCEDURE — 85025 COMPLETE CBC W/AUTO DIFF WBC: CPT | Performed by: FAMILY MEDICINE

## 2018-03-04 RX ORDER — ONDANSETRON 2 MG/ML
4 INJECTION INTRAMUSCULAR; INTRAVENOUS EVERY 30 MIN PRN
Status: DISCONTINUED | OUTPATIENT
Start: 2018-03-04 | End: 2018-03-04

## 2018-03-04 RX ORDER — ONDANSETRON 2 MG/ML
4 INJECTION INTRAMUSCULAR; INTRAVENOUS EVERY 6 HOURS PRN
Status: DISCONTINUED | OUTPATIENT
Start: 2018-03-04 | End: 2018-03-08

## 2018-03-04 RX ORDER — VENLAFAXINE HYDROCHLORIDE 150 MG/1
150 TABLET, EXTENDED RELEASE ORAL
Status: DISCONTINUED | OUTPATIENT
Start: 2018-03-05 | End: 2018-03-08 | Stop reason: HOSPADM

## 2018-03-04 RX ORDER — ACETAMINOPHEN 325 MG/1
650 TABLET ORAL EVERY 4 HOURS PRN
Status: DISCONTINUED | OUTPATIENT
Start: 2018-03-04 | End: 2018-03-08 | Stop reason: HOSPADM

## 2018-03-04 RX ORDER — ONDANSETRON 4 MG/1
4 TABLET, ORALLY DISINTEGRATING ORAL EVERY 6 HOURS PRN
Status: DISCONTINUED | OUTPATIENT
Start: 2018-03-04 | End: 2018-03-08

## 2018-03-04 RX ORDER — NALOXONE HYDROCHLORIDE 0.4 MG/ML
.1-.4 INJECTION, SOLUTION INTRAMUSCULAR; INTRAVENOUS; SUBCUTANEOUS
Status: DISCONTINUED | OUTPATIENT
Start: 2018-03-04 | End: 2018-03-08

## 2018-03-04 RX ORDER — OXYCODONE HYDROCHLORIDE 5 MG/1
5-10 TABLET ORAL
Status: DISCONTINUED | OUTPATIENT
Start: 2018-03-04 | End: 2018-03-08

## 2018-03-04 RX ORDER — HYDROMORPHONE HYDROCHLORIDE 1 MG/ML
.3-.5 INJECTION, SOLUTION INTRAMUSCULAR; INTRAVENOUS; SUBCUTANEOUS
Status: DISCONTINUED | OUTPATIENT
Start: 2018-03-04 | End: 2018-03-05

## 2018-03-04 RX ORDER — SODIUM CHLORIDE 9 MG/ML
1000 INJECTION, SOLUTION INTRAVENOUS CONTINUOUS
Status: DISCONTINUED | OUTPATIENT
Start: 2018-03-04 | End: 2018-03-04

## 2018-03-04 RX ORDER — IOPAMIDOL 755 MG/ML
100 INJECTION, SOLUTION INTRAVASCULAR ONCE
Status: COMPLETED | OUTPATIENT
Start: 2018-03-04 | End: 2018-03-04

## 2018-03-04 RX ORDER — ACETAMINOPHEN 325 MG/1
975 TABLET ORAL ONCE
Status: COMPLETED | OUTPATIENT
Start: 2018-03-04 | End: 2018-03-05

## 2018-03-04 RX ORDER — SODIUM CHLORIDE, SODIUM LACTATE, POTASSIUM CHLORIDE, CALCIUM CHLORIDE 600; 310; 30; 20 MG/100ML; MG/100ML; MG/100ML; MG/100ML
INJECTION, SOLUTION INTRAVENOUS CONTINUOUS
Status: DISCONTINUED | OUTPATIENT
Start: 2018-03-04 | End: 2018-03-08 | Stop reason: HOSPADM

## 2018-03-04 RX ORDER — NALOXONE HYDROCHLORIDE 0.4 MG/ML
.1-.4 INJECTION, SOLUTION INTRAMUSCULAR; INTRAVENOUS; SUBCUTANEOUS
Status: DISCONTINUED | OUTPATIENT
Start: 2018-03-04 | End: 2018-03-08 | Stop reason: HOSPADM

## 2018-03-04 RX ADMIN — SODIUM CHLORIDE, POTASSIUM CHLORIDE, SODIUM LACTATE AND CALCIUM CHLORIDE: 600; 310; 30; 20 INJECTION, SOLUTION INTRAVENOUS at 16:53

## 2018-03-04 RX ADMIN — OXYCODONE HYDROCHLORIDE 10 MG: 5 TABLET ORAL at 21:35

## 2018-03-04 RX ADMIN — SODIUM CHLORIDE 66 ML: 9 INJECTION, SOLUTION INTRAVENOUS at 11:53

## 2018-03-04 RX ADMIN — IOPAMIDOL 100 ML: 755 INJECTION, SOLUTION INTRAVENOUS at 11:53

## 2018-03-04 RX ADMIN — HYDROMORPHONE HYDROCHLORIDE 1 MG: 1 INJECTION, SOLUTION INTRAMUSCULAR; INTRAVENOUS; SUBCUTANEOUS at 11:40

## 2018-03-04 RX ADMIN — ACETAMINOPHEN 650 MG: 325 TABLET, FILM COATED ORAL at 23:43

## 2018-03-04 RX ADMIN — ONDANSETRON 4 MG: 2 INJECTION INTRAMUSCULAR; INTRAVENOUS at 21:40

## 2018-03-04 RX ADMIN — METRONIDAZOLE 500 MG: 500 INJECTION, SOLUTION INTRAVENOUS at 21:35

## 2018-03-04 RX ADMIN — Medication 0.5 MG: at 16:53

## 2018-03-04 RX ADMIN — Medication 0.5 MG: at 19:46

## 2018-03-04 RX ADMIN — ONDANSETRON 4 MG: 2 INJECTION INTRAMUSCULAR; INTRAVENOUS at 14:00

## 2018-03-04 RX ADMIN — SODIUM CHLORIDE 1000 ML: 9 INJECTION, SOLUTION INTRAVENOUS at 11:39

## 2018-03-04 ASSESSMENT — ACTIVITIES OF DAILY LIVING (ADL)
COGNITION: 0 - NO COGNITION ISSUES REPORTED
AMBULATION: 0-->INDEPENDENT
DRESS: 0-->INDEPENDENT
TRANSFERRING: 0-->INDEPENDENT
TOILETING: 0-->INDEPENDENT
RETIRED_EATING: 0-->INDEPENDENT
BATHING: 0-->INDEPENDENT
FALL_HISTORY_WITHIN_LAST_SIX_MONTHS: NO
SWALLOWING: 0-->SWALLOWS FOODS/LIQUIDS WITHOUT DIFFICULTY
RETIRED_COMMUNICATION: 0-->UNDERSTANDS/COMMUNICATES WITHOUT DIFFICULTY

## 2018-03-04 ASSESSMENT — ENCOUNTER SYMPTOMS
FEVER: 0
FATIGUE: 1
ANAL BLEEDING: 1
COUGH: 0
NAUSEA: 1
VOMITING: 0
SHORTNESS OF BREATH: 0
ABDOMINAL PAIN: 1
RECTAL PAIN: 0
BLOOD IN STOOL: 1
CHILLS: 0
DIARRHEA: 1

## 2018-03-04 NOTE — ED PROVIDER NOTES
History     Chief Complaint   Patient presents with     Abdominal Pain     left sided abdominal pain. Seen yesterday.      Diarrhea     HPI  Doretha Fernandez is a 43 year old female who presents with complaint of abdominal pain and bloody diarrhea.  She was seen here yesterday and comes in today with no improvement in symptoms and worsening of the body discharge from her rectum.  She actually is not even having any diarrhea anymore.  She has no nausea and vomiting.  She has been trying to eat and drink but it is not going real well yet.    Please see note from yesterday's visit for more details surrounding etiology of this problem.    Problem List:    Patient Active Problem List    Diagnosis Date Noted     Bilateral leg cramps 03/07/2018     Priority: Medium     Intestinal giardiasis 03/05/2018     Priority: Medium     Rectal bleeding 03/04/2018     Priority: Medium     Diarrhea 03/04/2018     Priority: Medium     Colitis 03/01/2018     Priority: Medium     Functional diarrhea 02/22/2018     Priority: Medium     Sepsis due to cellulitis (H) 11/14/2017     Priority: Medium     Melanoma (H) 10/23/2017     Priority: Medium     Malignant melanoma of left upper extremity including shoulder (H) 10/12/2017     Priority: Medium     Metastatic malignant melanoma (H) 10/10/2017     Priority: Medium     Prothrombin mutation (H) 04/05/2017     Priority: Medium     On daily aspirin 81 mg per hematology's recommendations from 2008       Vision changes 04/01/2017     Priority: Medium     Acute non intractable tension-type headache 04/01/2017     Priority: Medium     Mild intermittent asthma without complication 11/08/2013     Priority: Medium     Mild major depression (H) 06/24/2013     Priority: Medium     Anxiety state 09/13/2012     Priority: Medium     Problem list name updated by automated process. Provider to review       Esophageal reflux 09/13/2012     Priority: Medium     DUB (dysfunctional uterine bleeding) 07/28/2011      Priority: Medium     CARDIOVASCULAR SCREENING; LDL GOAL LESS THAN 160 2011     Priority: Low        Past Medical History:    Past Medical History:   Diagnosis Date     Abnormal MRI      Anxiety      Depression      Lumbago      Malignant melanoma (H)      Mild persistent asthma      Prothrombin deficiency (H)      Stroke (cerebrum) (H)      TIA (transient ischemic attack)        Past Surgical History:    Past Surgical History:   Procedure Laterality Date     COLONOSCOPY N/A 10/18/2017    Procedure: COLONOSCOPY;  Colon;  Surgeon: Debbie Stephens MD;  [facility]: [facility]     COLONOSCOPY N/A 3/9/2018    Procedure: COMBINED COLONOSCOPY, SINGLE OR MULTIPLE BIOPSY/POLYPECTOMY BY BIOPSY;  colon  ;  Surgeon: Benita Schumacher MD;  Location: [facility] GI     DISSECT LYMPH NODE AXILLA Left 10/23/2017    Procedure: DISSECT LYMPH NODE AXILLA;  Left Axillary Lymph Node Dissection ;  Surgeon: Laurent Cool MD;  Location: [facility] OR     GYN SURGERY           REPAIR MOHS Left 2017    Procedure: REPAIR MOHS;  Left Upper Lid Moh's Reconstruction;  Surgeon: Kisha Bosch MD;  Location: [facility] OR       Family History:    Family History   Problem Relation Age of Onset     CANCER Mother 45     lung     Neurologic Disorder Mother      Lipids Father      GASTROINTESTINAL DISEASE Father      Depression Father      CANCER Maternal Grandmother      Blood Disease Maternal Grandmother      Arthritis Maternal Grandmother      DIABETES Maternal Grandmother      Depression Maternal Grandmother      Macular Degeneration Maternal Grandmother      Glaucoma Maternal Grandmother      DIABETES Maternal Grandfather      CEREBROVASCULAR DISEASE Maternal Grandfather      Blood Disease Maternal Grandfather      HEART DISEASE Maternal Grandfather      Glaucoma Maternal Grandfather      CANCER Paternal Grandmother      Cancer - colorectal Paternal Grandmother      Respiratory Paternal Grandfather      Blood Disease Paternal Grandfather       "HEART DISEASE Daughter      Asthma Daughter      Depression Sister        Social History:  Marital Status:   [4]  Social History   Substance Use Topics     Smoking status: Passive Smoke Exposure - Never Smoker     Last attempt to quit: 3/20/1998     Smokeless tobacco: Never Used     Alcohol use No      Comment: occ        Medications:      Acetaminophen (TYLENOL PO)   venlafaxine (EFFEXOR-ER) 150 MG TB24 24 hr tablet   Cholecalciferol (VITAMIN D3) 3000 UNITS TABS   predniSONE (DELTASONE) 50 MG tablet   HYDROmorphone (DILAUDID) 4 MG tablet   LORazepam (ATIVAN) 0.5 MG tablet   ondansetron (ZOFRAN-ODT) 8 MG ODT tab   order for DME   order for DME   rOPINIRole (REQUIP) 0.25 MG tablet         Review of Systems   Constitutional: Positive for fatigue. Negative for chills and fever.   Respiratory: Negative for cough and shortness of breath.    Gastrointestinal: Positive for abdominal pain, anal bleeding, blood in stool, diarrhea and nausea. Negative for rectal pain and vomiting.       Physical Exam   BP: 123/82  Pulse: 78  Temp: 98.1  F (36.7  C)  Resp: 14  Height: 5' 3\" (160 cm)  Weight: 210 lb (95.3 kg)  SpO2: 97 %      Physical Exam   Constitutional: She appears well-developed and well-nourished. No distress.   HENT:   Head: Normocephalic and atraumatic.   Right Ear: External ear normal.   Left Ear: External ear normal.   Eyes: Pupils are equal, round, and reactive to light.   Neck: Normal range of motion. Neck supple.   Abdominal: Soft. Bowel sounds are normal. She exhibits distension. There is tenderness.   Neurological: She is alert.   Skin: Skin is warm and dry. She is not diaphoretic.   Psychiatric: She has a normal mood and affect.       ED Course     ED Course     Procedures               Critical Care time:  none               Labs Ordered and Resulted from Time of ED Arrival Up to the Time of Departure from the ED   COMPREHENSIVE METABOLIC PANEL - Abnormal; Notable for the following:        Result Value "    Glucose 67 (*)     Calcium 8.3 (*)     Bilirubin Total 0.1 (*)     Albumin 3.3 (*)     ALT 60 (*)     All other components within normal limits   CBC WITH PLATELETS DIFFERENTIAL - Abnormal; Notable for the following:     WBC 11.2 (*)     All other components within normal limits   PERIPHERAL IV CATHETER   FREE WATER       Assessments & Plan (with Medical Decision Making)     I have reviewed the nursing notes.    I have reviewed the findings, diagnosis, plan and need for follow up with the patient.   Phone consultation with gastroenterology at the [facility] was obtained.  They recommended continuing with the Flagyl for 2 weeks.  They recommended avoiding steroids until more definitive diagnosis has been reached.  They recommended that the patient contact her oncology clinic this week and asked them how to proceed with setting up a flexible sigmoidoscopy with biopsies for more definitive diagnosis.    Patient received IV fluids and Zofran in the ED.    Discharge Medication List as of 3/8/2018  4:51 PM      START taking these medications    Details   metroNIDAZOLE (FLAGYL) 5 mg/mL infusion Inject 100 mLs (500 mg) into the vein every 8 hours, Disp-5600 mL, R-0, Transitional             Final diagnoses:   Hemorrhage of rectum and anus       3/4/2018   [facility] EMERGENCY DEPARTMENT     Isaiah Reid MD  03/04/18 1383

## 2018-03-04 NOTE — IP AVS SNAPSHOT
"` `           [facility] SURGICAL: [phone]                                              INTERAGENCY TRANSFER FORM - NURSING   3/4/2018                    Hospital Admission Date: 3/4/2018  ELIZABETH MOREIRA   : 1975  Sex: Female        Attending Provider: Mark Anthony German DO     Allergies:  Compazine [Prochlorperazine], Fentanyl, Erythrocin, Zithromax [Azithromycin Dihydrate]    Infection:  None   Service:  HOSPITALIST    Ht:  1.6 m (5' 3\")   Wt:  96.5 kg (212 lb 11.9 oz)   Admission Wt:  95.3 kg (210 lb)    BMI:  37.69 kg/m 2   BSA:  2.07 m 2            Patient PCP Information     Provider PCP Type    Anna Naidu MD General      Current Code Status     Date Active Code Status Order ID Comments User Context       Prior      Code Status History     Date Active Date Inactive Code Status Order ID Comments User Context    3/8/2018  3:52 PM 3/8/2018  3:55 PM Full Code [id number]  Cyndie Epstein APRN [facility] Inpatient    3/8/2018 12:08 PM 3/8/2018  3:52 PM Full Code [id number]  Jeffry Yeh MD Outpatient    3/4/2018  6:29 PM 3/8/2018 12:08 PM Full Code [id number]  Chantelle Lake PA-C Inpatient    2017 10:16 PM 2017 12:20 PM Full Code [id number]  Lowell Delatorre MD Inpatient    10/23/2017  3:56 PM 10/24/2017  5:18 PM Full Code [id number]  Annamarie Klein MD Inpatient    2017  7:10 PM 2017  2:59 PM Full Code [id number]  Marcela Treviño MD Inpatient      Advance Directives        Scanned docmt in ACP Activity?           No scanned doc        Hospital Problems as of 3/8/2018              Priority Class Noted POA    Mild major depression (H) Medium  2013 Yes    Metastatic malignant melanoma (H) Medium  10/10/2017 Yes    * (Principal)Rectal bleeding Medium  3/4/2018 Yes    Diarrhea Medium  3/4/2018 Yes    Intestinal giardiasis Medium  3/5/2018 Yes    Bilateral leg cramps Medium  3/7/2018 No      Non-Hospital Problems as of 3/8/2018              " Priority Class Noted    DUB (dysfunctional uterine bleeding) Medium  7/28/2011    CARDIOVASCULAR SCREENING; LDL GOAL LESS THAN 160 Low  7/28/2011    Anxiety state Medium  9/13/2012    Esophageal reflux Medium  9/13/2012    Mild intermittent asthma without complication Medium  11/8/2013    Vision changes Medium  4/1/2017    Acute non intractable tension-type headache Medium  4/1/2017    Prothrombin mutation (H) Medium  4/5/2017    Malignant melanoma of left upper extremity including shoulder (H) Medium  10/12/2017    Melanoma (H) Medium  10/23/2017    Sepsis due to cellulitis (H) Medium  11/14/2017    Functional diarrhea Medium  2/22/2018    Colitis Medium  3/1/2018      Immunizations     Name Date      Influenza (IIV3) PF 10/07/11     Influenza (IIV3) PF 11/07/08     Influenza Vaccine IM 3yrs+ 4 Valent IIV4 11/02/17     Influenza Vaccine IM 3yrs+ 4 Valent IIV4 11/09/16     TDAP Vaccine (Adacel) 04/09/12     TDAP Vaccine (Adacel) 07/28/11          END      ASSESSMENT     Discharge Profile Flowsheet     EXPECTED DISCHARGE     Passing flatus  yes 03/08/18 0823    Expected Discharge Date  03/06/18 03/05/18 1259   COMMUNICATION ASSESSMENT      DISCHARGE NEEDS ASSESSMENT     Patient's communication style  spoken language (English or Bilingual) 03/04/18 1026    Patient/family verbalizes understanding of discharge plan recommendations?  Yes 03/05/18 1259   SKIN      Equipment Currently Used at Home  none 03/05/18 1259   Inspection of bony prominences  Full 03/08/18 1033    Transportation Available  family or friend will provide 03/05/18 1259   Full except areas not inspected   Buttock, left;Buttock, right;Sacrum;Coccyx 03/05/18 0920    GASTROINTESTINAL (ADULT,PEDIATRIC,OB)     Skin WDL  WDL 03/08/18 1033    GI WDL  ex 03/08/18 1033   SAFETY      All Quadrants Bowel Sounds  audible and active in all quadrants 03/08/18 0823   Safety WDL  WDL 03/08/18 1033    Last Bowel Movement  03/08/18 03/08/18 1033   Safety Factors  ID  "band on;upper side rails raised x 2;call light in reach;wheels locked;bed in low position 03/08/18 0823    GI Signs/Symptoms  abdominal pain 03/08/18 1033   All Alarms  alarm(s) activated and audible 03/08/18 1033                 Assessment WDL (Within Defined Limits) Definitions           Safety WDL     Effective: 09/28/15    Row Information: <b>WDL Definition:</b> Bed in low position, wheels locked; call light in reach; upper side rails up x 2; ID band on<br> <font color=\"gray\"><i>Item=AS safety wdl>>List=AS safety wdl>>Version=F14</i></font>      Skin WDL     Effective: 09/28/15    Row Information: <b>WDL Definition:</b> Warm; dry; intact; elastic; without discoloration; pressure points without redness<br> <font color=\"gray\"><i>Item=AS skin wdl>>List=AS skin wdl>>Version=F14</i></font>      Vitals     Vital Signs Flowsheet     VITAL SIGNS     Functioning  can do most things, but pain gets in the way of some 03/08/18 1232    Temp  98.6  F (37  C) 03/08/18 1543   Sleep  normal sleep 03/08/18 1232    Temp src  Oral 03/08/18 1543   ANALGESIA SIDE EFFECTS MONITORING      Resp  16 03/08/18 1543   Side Effects Monitoring: Respiratory Quality  R 03/07/18 1419    Pulse  80 03/08/18 1543   Side Effects Monitoring: Respiratory Depth  N 03/07/18 1419    Pulse/Heart Rate Source  Monitor 03/08/18 1543   Side Effects Monitoring: Sedation Level  S 03/07/18 1419    BP  115/70 03/08/18 1543   HEIGHT AND WEIGHT      BP Location  Right arm 03/08/18 1543   Height  1.6 m (5' 3\") 03/04/18 1636    OXYGEN THERAPY     Height Method  Stated 03/04/18 1636    SpO2  95 % 03/08/18 1543   Weight  96.5 kg (212 lb 11.9 oz) 03/04/18 1636    O2 Device  None (Room air) 03/08/18 1543   Weight Method  Bed scale 03/04/18 1636    PAIN/COMFORT     BSA (Calculated - sq m)  2.07 03/04/18 1636    Patient Currently in Pain  sleeping: patient not able to self report 03/08/18 1026   BMI (Calculated)  37.76 03/04/18 1636    Preferred Pain Scale  CAPA " (Clinically Aligned Pain Assessment) ([facility] Adults Only) 03/07/18 2118   POSITIONING      0-10 Pain Scale  4 03/06/18 0556   Body Position  independently positioning 03/08/18 1033    Pain Location  Abdomen 03/07/18 2118   Head of Bed (HOB)  HOB at 20-30 degrees 03/07/18 2118    Pain Orientation  Right;Left 03/07/18 1455   YUDY COMA SCALE      Pain Descriptors  Sharp;Aching;Cramping 03/07/18 2118   Best Eye Response  4-->(E4) spontaneous 03/07/18 0125    Pain Management Interventions  analgesia administered 03/07/18 2118   Best Motor Response  6-->(M6) obeys commands 03/07/18 0125    Pain Intervention(s)  Medication (See eMAR);Heat applied 03/07/18 2118   Best Verbal Response  5-->(V5) oriented 03/07/18 0125    Response to Interventions  Absence of nonverbal indicators of pain 03/06/18 1411   [place] Coma Scale Score  15 03/07/18 0125    CLINICALLY ALIGNED PAIN ASSESSMENT (CAPA) ([facility] ADULTS ONLY)     Assessment Qualifiers  patient not sedated/intubated 03/04/18 1619    Comfort  tolerable with discomfort 03/08/18 1333   DAILY CARE      Change in Pain  about the same 03/08/18 1333   Activity Management  up in chair;ambulated in room 03/08/18 1033    Pain Control  partially effective 03/08/18 1232   Activity Assistance Provided  assistance, stand-by 03/08/18 1033            Patient Lines/Drains/Airways Status    Active LINES/DRAINS/AIRWAYS     Name: Placement date: Placement time: Site: Days: Last dressing change:    Closed/Suction Drain 1 Left Breast Bulb 10 Telugu 10/23/17   1227   Breast   136     Peripheral IV 03/06/18 Right 03/06/18   2243      1     Wound 11/15/17 Left Axillary Abscess;Laceration 11/15/17   0800   Axillary   113     Incision/Surgical Site 07/25/17 Left Eye 07/25/17       226     Incision/Surgical Site 10/23/17 Left Axillary 10/23/17   1234    136             Patient Lines/Drains/Airways Status    Active PICC/CVC     None            Intake/Output Detail  Report     Date Intake     Output Net    Shift P.O. I.V. IV Piggyback Total Urine Total       Day 03/07/18 0700 - 03/07/18 1459 -- 986 -- 986 950 950 36    Laura 03/07/18 1500 - 03/07/18 2259 405 868 -- 1273 600 600 673    Noc 03/07/18 2300 - 03/08/18 0659 450 881 -- 1331 1000 1000 331    Day 03/08/18 0700 - 03/08/18 1459 -- 1072 -- 1072 650 650 422    Laura 03/08/18 1500 - 03/08/18 2259 -- -- -- -- 800 800 -800      Last Void/BM       Most Recent Value    Urine Occurrence 1 at 03/08/2018 1400    Stool Occurrence 1 at 03/08/2018 1400      Case Management/Discharge Planning     Case Management/Discharge Planning Flowsheet     REFERRAL INFORMATION     Will you be able to return to your job?  yes 03/05/18 1259    Did the Initial Social Work Assessment result in a Social Work Case?  No 03/05/18 1259   COPING/STRESS      Reason For Consult  discharge planning 03/05/18 1259   Major Change/Loss/Stressor  illness 03/04/18 1704    [facility] Name  [facility] 03/05/18 1259   EXPECTED DISCHARGE      Primary Care MD Name  Anna Naidu 03/05/18 1259   Expected Discharge Date  03/06/18 03/05/18 1259    LIVING ENVIRONMENT     DISCHARGE PLANNING      Lives With  child(gold), dependent 03/07/18 1640   Patient/family verbalizes understanding of discharge plan recommendations?  Yes 03/05/18 1259    Living Arrangements  house 03/07/18 1640   Transportation Available  family or friend will provide 03/05/18 1259    Able to Return to Prior Living Arrangements  yes 03/05/18 1259   FINAL RESOURCES      ASSESSMENT OF FAMILY/SOCIAL SUPPORT     Equipment Currently Used at Home  none 03/05/18 1259    Marital Status   03/05/18 1259   ABUSE RISK SCREEN      Who is your support system?  Parent(s) 03/05/18 1259   QUESTION TO PATIENT:  Has a member of your family or a partner(now or in the past) intimidated, hurt, manipulated, or controlled you in any way?  no 03/04/18 1029    Description of Support System  Supportive;Involved 03/05/18  1259   QUESTION TO PATIENT: Do you feel safe going back to the place where you are living?  yes 03/04/18 1029    Support Assessment  Adequate family and caregiver support 03/05/18 1259   OBSERVATION: Is there reason to believe there has been maltreatment of a vulnerable adult (ie. Physical/Sexual/Emotional abuse, self neglect, lack of adequate food, shelter, medical care, or financial exploitation)?  no 03/04/18 1029    EMPLOYMENT     OTHER      Do you work full or part-time?  yes 03/05/18 1259   Are you depressed or being treated for depression?  Yes 03/04/18 2193

## 2018-03-04 NOTE — LETTER
[facility] SURGICAL  [address] [address]  [phone]          March 8, 2018    RE:  Doretha Fernandez                                                                                                                                                       [address] [address]            To whom it may concern:    Doretha Fernandez has been hospitalized at [facility] since 3/3/18.  She is being transferred to another hospital; her discharge date and return to work date are not known.      Sincerely,        Jeffry Yeh MD  [facility]

## 2018-03-04 NOTE — IP AVS SNAPSHOT
` `     [facility] SURGICAL: [phone]                 INTERAGENCY TRANSFER FORM - NOTES (H&P, Discharge Summary, Consults, Procedures, Therapies)   3/4/2018                    Hospital Admission Date: 3/4/2018  DORETHA FERNANDEZ   : 1975  Sex: Female        Patient PCP Information     Provider PCP Type    Anna Naidu MD General         History & Physicals      H&P by Chantelle Lake PA-C at 3/4/2018  3:01 PM     Author:  Chantelle Lake PA-C Service:  Internal Medicine Author Type:  Physician Assistant - C    Filed:  3/4/2018 10:14 PM Date of Service:  3/4/2018  3:01 PM Creation Time:  3/4/2018  3:01 PM    Status:  Attested :  Chantelle Lake PA-C (Physician Assistant - JACE)    Cosigner:  Gomez Mejia MD at 3/5/2018 11:30 AM        Attestation signed by Gomez Mejia MD at 3/5/2018 11:30 AM        Physician Attestation   IGomez, have reviewed and discussed with the advanced practice provider their history, physical and plan for Doretha Fernandez. I did not participate in a shared visit by interviewing or examining the patient and this should be billed as an advanced practice provider only visit.    Gomez Mejia  Date of Service (when I saw the patient): I did not personally see this patient today.                               [facility]    History and Physical  Hospital Medicine       Date of Admission:  3/4/2018  Date of Service: 3/4/2018[RL1.1]     Assessment & Plan[RL1.2]   Doretha Fernandez is a 43 year old female with PMH[RL1.1] metastatic melanoma, depression/anxiety, TIA, migraines and thrombosis disorder[RL1.3] who presents with[RL1.1] bloody stools x 2 weeks.[RL1.3]    Persistent[RL1.1] bloody stools with mucus and diarrhea  2 weeks of worsening bloody mucus and diarrhea. Dull abdominal pain with intermittent sharp cramps, worst in LLQ. Nausea without emesis. Afebrile. WBC 11.2. No[RL1.3] recent travel. CT abdomen/pelvis 3/4/2018  shows colonic wall thickening and mucosal enhancement involving rectum and distal sigmoid consistent with infectious coloprotitis vs inflammatory bowel disease. Treated with 1 dose of tinidazole for positive giardia 2/23/2018[RL1.1] (no other antibiotics recently)[RL1.3]; repeat giardia 2/28/2018 was negative. Fecal lactoferrin positive at that time.[RL1.1] Prescribed steroid taper by Dr. Ghosh on 3/1/2018.[RL1.4] ED spoke with GI who recommends[RL1.1] stopping steroids,[RL1.4] metronidazole x 2 weeks and flexible sigmoidoscopy with biopsies[RL1.1]t[RL1.4].  DDx: Persistent giardia vs side effect from immunotherapy treatment vs Inflammatory bowel disease vs Other viral/bacterial infection (enteric bacteria/viral stool studies negative 3/3/2018).  - IVF[RL1.1] with LR at 125 cc/hr[RL1.3]  - stop PTA prednisone[RL1.4]  - IV metronidazole 500 mg q 8 hours[RL1.3]  -[RL1.1] Zofran prn[RL1.3]  -[RL1.1] follow pending[RL1.3] stool O&P, giardia, c.diff   -[RL1.1] NPO, small sips ok[RL1.3]  -[RL1.1] plan ofr[RL1.4] flexible sigmoidoscopy with biopsy,[RL1.1] likely as[RL1.4] outpatient[RL1.1]   - pain management: tylenol prn, oxycodone prn, dilaudid prn    Headache with history of migraines  Occipital headache that wraps around to frontal. Similar to prior migraines. Consistent with tension headache. Patient followed with neurology (last seen 10/2017) for history of daily migraine, previously managed on topirimate, gabapentin, Wellbutrin, which did not help her symptoms. Has also been treated with Benadryl, Reglan, Toradol and Decadron in the past, which patient states does not help her symptoms. Reports daily migraines stopped after diagnosis of her melanoma. Stopped all migraine medications and has not had headache until today.  - pain management as above[RL1.3]    Metastatic malignant melanoma (H)[RL1.5]  'Diagnosed on lymph node biopsy in 10/2017. Primary site not identified. Follows with Dr. Richards. Last seen 2/22/2018. On  immunotherapy with Opdivo[RL1.1] (nivolumab) since November every 2 weeks[RL1.3], which was recently held due to persistent diarrhea.[RL1.1] Reports on treatment 18 of 26.[RL1.3]     Mild major depression[RL1.5]  Anxiety  Stable.  - continue home venlafaxine    Mild intermittent asthma  Well-controlled.[RL1.1] Not on home medications.    Prothrombin Gene Mutation  History of stroke  Follows with Dr. Richards. History of stroke/TIA following both pregnancies. Hypercoagulability work up positive for prothrombin gene mutation. No history of DVT/PE.  - hold home ASA given bleeding[RL1.3]    # Pain Assessment:[RL1.1]   Current Pain Score 3/4/2018 3/4/2018   Patient currently in pain? - -   Pain score (0-10) 4 3   Pain location - -   Pain descriptors - -[RL1.5]   - Doretha is experiencing pain due to headache and diarrhea. Pain management was discussed and the plan was created in a collaborative fashion.  Doretha's response to the current recommendations: engaged  - Please see the plan for pain management as documented above[RL1.3]    FEN:  -[RL1.1] LR at 125 cc/hr[RL1.3]  -Will monitor electrolytes and replace as needed  -[RL1.1] NPO except meds, ice chips, small sips[RL1.3]    DVT Prophylaxis:[RL1.1] Pneumatic Compression Devices[RL1.3]  Code Status:[RL1.1] Full Code[RL1.3]    Disposition: Anticipate discharge in[RL1.1] 2-3[RL1.3] days once[RL1.1] patient is tolerating PO and work up is complete. Appropr[RL1.3]iate for[RL1.1] inpatient level[RL1.3] care    I have discussed patient and formulated plan with[RL1.1] Dr. Gomez Mejia[RL1.3]    Chantelle Lake PA-C  [facility][RL1.1]        Primary Care Physician[RL1.2]   Anna Naidu [phone][RL1.1]    History is obtained from the patient,[RL1.3] ED notes and review of the EMR.[RL1.1]    Past Medical History    Past Medical History:   Diagnosis Date     Abnormal MRI     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      Anxiety      Depression      Lumbago      left lower back pain     Malignant melanoma (H)      Mild persistent asthma      Prothrombin deficiency (H)     takes 81mg asa daily     Stroke (cerebrum) (H)     During      TIA (transient ischemic attack)      Patient Active Problem List    Diagnosis Date Noted     Colitis 2018     Priority: Medium     Functional diarrhea 2018     Priority: Medium     Sepsis due to cellulitis (H) 2017     Priority: Medium     Melanoma (H) 10/23/2017     Priority: Medium     Malignant melanoma of left upper extremity including shoulder (H) 10/12/2017     Priority: Medium     Metastatic malignant melanoma (H) 10/10/2017     Priority: Medium     Prothrombin mutation (H) 2017     Priority: Medium     On daily aspirin 81 mg per hematology's recommendations from        Vision changes 2017     Priority: Medium     Acute non intractable tension-type headache 2017     Priority: Medium     Mild intermittent asthma without complication 2013     Priority: Medium     Mild major depression (H) 2013     Priority: Medium     Anxiety state 2012     Priority: Medium     Problem list name updated by automated process. Provider to review       Esophageal reflux 2012     Priority: Medium     DUB (dysfunctional uterine bleeding) 2011     Priority: Medium     CARDIOVASCULAR SCREENING; LDL GOAL LESS THAN 160 2011     Priority: Low      Past Surgical History   Past Surgical History:   Procedure Laterality Date     COLONOSCOPY N/A 10/18/2017    Procedure: COLONOSCOPY;  Colon;  Surgeon: Debbie Stephens MD;  Location:  OR     DISSECT LYMPH NODE AXILLA Left 10/23/2017    Procedure: DISSECT LYMPH NODE AXILLA;  Left Axillary Lymph Node Dissection ;  Surgeon: Laurent Cool MD;  Location: [facility] OR     GYN SURGERY           REPAIR MOHS Left 2017    Procedure: REPAIR MOHS;  Left Upper Lid Moh's Reconstruction;  Surgeon: Kisha Bosch MD;  Location:   "OR      History of Present Illness[RL1.2]   Doretha Fernandez is a 43 year old female who presents with[RL1.1] worsening bloody stools over the past 2 weeks.    She states 2 weeks ago after her last infusion she started having diarrhea mixed with blood and pus. This has been ongoing and worsened 5 days ago and is now mostly blood with pus/mucus. She tested positive for giardia on 2/23/2018 and was treated with a dose of tinidazole. Repeat testing was negative. She did not notice much improvement in her symptoms. She is undergoing Opdivo infusions since November, which have been held due to concern that could be a side effect.     She reports that the department of public health called her and notified her that they believed her incubation period was around January 28th, she reports eating out often at that time. No recent travel. No other recent antibiotics.    Reports lower left quadrant abdominal pain which she describes as \"uncomortable\". She is also having worse intermittent left-sided sharp pains which last a few seconds. Worse with oral intake. She is hungry but has not been eating much because this immediately causes her to have a bowel movement. Reports nausea but no emesis.    Reports an occipital headache which wraps around to the frontal area. Feels \"dull\". Similar to her past headaches. Tried acetaminophen without much relief.     Reports chills and night sweats which have been intermittent since starting the infusions in November, no change from baseline.    Reports recent rash/redness in her face and chest due to prednisone which was started by her oncologist. Has been resolving since stopping prednisone.     Denies fever, myalgias, lightheadedness, dizziness, sore throat, rhinorrhea, congestion, cough, shortness of breath, chest pain, palpitations, dysuria, changes in urinary frequency/urgency or wounds.[RL1.3]    Prior to Admission Medications   Prior to Admission Medications   Prescriptions Last Dose " "Informant Patient Reported? Taking?   Acetaminophen (TYLENOL PO) Past Week at Unknown time Self Yes Yes   Sig: Take 1,000 mg by mouth every 8 hours as needed for mild pain or fever   Cholecalciferol (VITAMIN D3) 3000 UNITS TABS 3/4/2018 at Unknown time Self No Yes   Sig: Take 3,000 Int'l Units by mouth daily   aspirin 81 MG EC tablet 3/4/2018 at Unknown time Self No Yes   Sig: Take 1 tablet (81 mg) by mouth daily   metroNIDAZOLE (FLAGYL) 500 MG tablet 3/4/2018 at am  No Yes   Sig: Take 1 tablet (500 mg) by mouth 2 times daily for 7 days   order for DME  Self No No   Sig: Equipment being ordered: 20-30mmHg compression sleeve and 20-30mmHg compression glove\" x2 pair   order for DME  Self No No   Sig: Equipment being ordered: x2 Wearease compression shirt   oxyCODONE IR (ROXICODONE) 5 MG tablet Past Month at Unknown time Self No Yes   Sig: Take 1-3 tablets (5-15 mg) by mouth every 4 hours as needed for pain maximum 12 tablet(s) per day   predniSONE (DELTASONE) 10 MG tablet 3/4/2018 at am Self No Yes   Sig: Take 60 mg by mouth daily x 4 days then decrease by 10 mg every 4 days until gone.   rOPINIRole (REQUIP) 0.25 MG tablet  Self No No   Sig: Take 1 tablet (0.25 mg) by mouth At Bedtime   venlafaxine (EFFEXOR-ER) 150 MG TB24 24 hr tablet 3/4/2018 at am Self No Yes   Sig: Take 1 tablet (150 mg) by mouth daily (with breakfast)   zonisamide (ZONEGRAN) 25 MG capsule  Self No No   Sig: Take 1 tablet (25 mg) once daily for 1 week, then 2 tablets once daily for 1 week, then 3 tablets once daily for 1 week, then 4 tablets once daily for 1 week.      Facility-Administered Medications: None     Allergies   Allergies   Allergen Reactions     Compazine [Prochlorperazine] Fatigue     Fentanyl Other (See Comments)     sweating     Erythrocin Nausea and Vomiting     Zithromax [Azithromycin Dihydrate] Diarrhea       Family History    Family History   Problem Relation Age of Onset     CANCER Mother 45     lung     Neurologic Disorder " Mother      Lipids Father      GASTROINTESTINAL DISEASE Father      Depression Father      CANCER Maternal Grandmother      Blood Disease Maternal Grandmother      Arthritis Maternal Grandmother      DIABETES Maternal Grandmother      Depression Maternal Grandmother      Macular Degeneration Maternal Grandmother      Glaucoma Maternal Grandmother      DIABETES Maternal Grandfather      CEREBROVASCULAR DISEASE Maternal Grandfather      Blood Disease Maternal Grandfather      HEART DISEASE Maternal Grandfather      Glaucoma Maternal Grandfather      CANCER Paternal Grandmother      Cancer - colorectal Paternal Grandmother      Respiratory Paternal Grandfather      Blood Disease Paternal Grandfather      HEART DISEASE Daughter      Asthma Daughter      Depression Sister        Social History   Social History     Social History     Marital status:      Spouse name: N/A     Number of children: N/A     Years of education: N/A     Occupational History     Not on file.     Social History Main Topics     Smoking status: Passive Smoke Exposure - Never Smoker     Last attempt to quit: 3/20/1998     Smokeless tobacco: Never Used     Alcohol use No      Comment: occ     Drug use: No     Sexual activity: Yes     Partners: Male     Birth control/ protection: Surgical     Other Topics Concern     Parent/Sibling W/ Cabg, Mi Or Angioplasty Before 65f 55m? No     Social History Narrative    20 y.o- patient's mother   of lung cancer. She had to take care of her younger sister.    2012- patient's  had a heart attack with stents placed, followed by cardiac rehabilitation    2000 TO 2012  was in [facility] for depression    2013 patient's  went through alcohol rehabilitation at [facility]            They have attended couple counseling a couple of times and patient went to the family program for chemical dependency.    Patient denies alcohol  or drug use and herself            Has 2 children, girls ages 10 and 13     For a while she was working 3 jobs since her  was ill. Works at the [employer] for [employer]. Reports her job is very stressful.              Review of Systems[RL1.2]   The 10 point Review of Systems is negative other than noted in the HPI or here.[RL1.3]     Physical Exam   /87  Pulse 78  Temp 98.1  F (36.7  C) (Oral)  Resp 14  Wt 95.3 kg (210 lb)  SpO2 95%  BMI 37.21 kg/m2[RL1.2]     Weight:[RL1.1] 210 lbs 0 oz Body mass index is 37.21 kg/(m^2).[RL1.2]     Constitutional: Patient is[RL1.1] lying down[RL1.3] on exam,[RL1.1] appears mildly uncomfortable, nontoxic, no acute distress, tearful at times. a[RL1.3]lert, oriented, cooperative, Appears stated age.  Eyes: Sclera are anicteric, EOMI, PEERLA  HENT: Normocephalic. Atraumatic. Oropharynx is clear and moist.   Cardiovascular: Regular rate and normal rhythm. No murmur, rubs or gallops noted. Radial pulses are 2+ bilaterally. Distal pulses are intact.[RL1.1] No[RL1.3] lower extremity edema.  Respiratory: No accessory muscle usage. Speaking in full sentences. Clear to auscultation bilaterally without wheezes, crackles or rhonchi.   GI: Normal bowel sounds present[RL1.1].[RL1.3] soft, non-distended.[RL1.1] Mild diffuse tenderness without[RL1.3] rebound or guarding.  Genitourinary: Deferred  Musculoskeletal: Normal muscle bulk and tone. Moves all extremities appropriately.  Skin: Warm and dry.[RL1.1] Slight erythema on upper chest. Non-tender. Not warm to touch.[RL1.3]   Neurologic: Neck supple. Cranial nerves 3-12 are grossly intact.  is symmetric.[RL1.1]     Data[RL1.2]   Data reviewed today:[RL1.1]     Recent Labs  Lab 03/04/18  1130 03/03/18  0813 03/01/18  1450   WBC 11.2* 12.8*  --    HGB 12.0 12.0  --    MCV 84 86  --     371  --     142 136   POTASSIUM 3.8 3.4 4.3   CHLORIDE 106 109 107   CO2 28 26 21   BUN 10 13 10   CR 0.62 0.69 0.52    ANIONGAP 5 7 8   PATRICIA 8.3* 8.5 8.7   GLC 67* 118* 167*   ALBUMIN 3.3* 3.5 3.2*   PROTTOTAL 7.0 7.3 7.7   BILITOTAL 0.1* 0.1* 0.3   ALKPHOS 65 63 75   ALT 60* 32 43   AST 39 17 31       Recent Results (from the past 24 hour(s))   CT Abdomen Pelvis w Contrast    Narrative    CT ABDOMEN AND PELVIS WITH CONTRAST 3/4/2018 12:16 PM     HISTORY: Bloody diarrhea/abdominal pain, history of melanoma, axilla  surgery in October 2017.       COMPARISON: PET/CT 10/11/2017.    TECHNIQUE: Axial images from the lung bases to the symphysis are  performed with additional coronal reformatted images. 100 mL of Isovue  370 are given intravenously.  Radiation dose for this scan was reduced  using automated exposure control, adjustment of the mA and/or kV  according to patient size, or iterative reconstruction technique.    FINDINGS:   The lung bases are clear.    Abdomen: Tiny cyst measuring less than 1 cm is present in the left  hepatic dome. This was present on a prior contrast-enhanced CT from  2012 and is therefore considered a benign cyst. The liver is otherwise  unremarkable. The spleen, gallbladder, fatty replaced pancreas,  adrenal glands, and kidneys are unremarkable. No hydronephrosis. No  enlarged lymph nodes. The bowel is normal in caliber without  obstruction or diverticulitis. Appendix is normal.    Pelvis: The bladder, uterus, and left ovary are unremarkable. Probable  dominant follicle right ovary is noted. No enlarged pelvic or inguinal  lymph nodes. There is colonic wall thickening and mucosal enhancement  involving the rectum and distal sigmoid colon with surrounding  perirectal adenopathy. In the surrounding mesorectal fat, small pelvic  sidewall lymph nodes are also noted. Findings could reflect an  infectious coloproctitis versus an inflammatory bowel disease such as  ulcerative colitis. Bone window examination is unremarkable.      Impression    IMPRESSION:  1. Probable infectious or inflammatory proctocolitis as  described.  Surrounding mesorectal lymph nodes raise the possibility of a more  chronic inflammatory process such as ulcerative colitis. Underlying  mass should likely be excluded with follow-up colonoscopy if not  previously performed.  2. Simple cyst left hepatic lobe unchanged dating back to at least  2012. No further follow-up required. Abdominal and pelvic organs are  otherwise within normal limits.    ALEX RIVERA MD[RL1.2]       I personally reviewed[RL1.1] no images or EKG's today[RL1.3].    I have discussed patient and formulated plan with[RL1.1] Dr. Gomez Mejia.[RL1.3]    Chart documentation with keystrokes and/or Dragon voice recognition software. Although reviewed after completion, some word and grammatical error may remain.  Chantelle Lake PA-C  [facility][RL1.1]     Revision History        User Key Date/Time User Provider Type Action    > RL1.4 3/4/2018 10:14 PM Chantelle Lake PA-C Physician Assistant - C Sign     RL1.2 3/4/2018  4:39 PM Chantelle Lake PA-C Physician Assistant - C Sign     RL1.3 3/4/2018  4:04 PM Chantelle Lake PA-C Physician Assistant - C      RL1.5 3/4/2018  3:06 PM Chantelle Lake PA-C Physician Assistant - C      RL1.1 3/4/2018  3:01 PM Chantelle Lake PA-C Physician Assistant - C                      Discharge Summaries      Discharge Summaries by Jeffry Yeh MD at 3/8/2018 12:33 PM     Author:  Jeffry Yeh MD Service:  Hospitalist Author Type:  Physician    Filed:  3/8/2018 12:33 PM Date of Service:  3/8/2018 12:33 PM Creation Time:  3/8/2018 12:08 PM    Status:  Signed :  Jeffry Yeh MD (Physician)         [facility]    Discharge Summary  Hospital Medicine    Date of Admission:  3/4/2018  Date of Discharge:  3/8/2018   Discharging Provider:[JM1.1] Jeffry Yeh[JM1.2]  Date of Service: 3/8/2018      Primary Care     Anna Naidu  [id number] [address] [facility] [address]       Identification and Chief Compaint: Doretha Fernandez is a 43 year old female with PMH metastatic melanoma, depression/anxiety, TIA, migraines and thrombosis disorder who presented 3/4/18 with bloody stools x 2 weeks.[JM1.1]    Discharge Diagnoses       Rectal bleeding    Mild major depression (H)    Metastatic malignant melanoma (H)    Diarrhea    Intestinal giardiasis    Bilateral leg cramps      Discharge Disposition[JM1.2]   Transferred to [facility] under the care of Dr. Maxi Davey.[JM1.1]    Discharge Orders     Reason for your hospital stay   You have been hospitalized for evaluation and treatment of abdominal pain and bloody diarrhea, which we're not yet able to fully explain.  You're being transferred to the [facility] for further evaluation and treatment, under the care of the Oncology team, as well as any specialists Oncology may wish to consult.     Activity - Up ad miriam     Full Code     Advance Diet as Tolerated   Follow this diet upon discharge: Orders Placed This Encounter     Advance Diet as Tolerated: Regular Diet Adult          Discharge Medications   Current Discharge Medication List      CONTINUE these medications which have CHANGED    Details   oxyCODONE IR (ROXICODONE) 5 MG tablet Take 1-2 tablets (5-10 mg) by mouth every 3 hours as needed for other (pain control or improvement in physical function. Hold dose for analgesic side effects.)  Qty: 18 tablet, Refills: 0    Associated Diagnoses: Abdominal pain, unspecified abdominal location         CONTINUE these medications which have NOT CHANGED    Details   metroNIDAZOLE (FLAGYL) 500 MG tablet Take 1 tablet (500 mg) by mouth 2 times daily for 7 days  Qty: 14 tablet, Refills: 0    Comments: Eat yogurt or cottage cheese daily to prevent diarrhea caused by taking this antibiotic      Acetaminophen (TYLENOL PO) Take 1,000 mg by mouth every 8 hours as needed for mild pain or fever      venlafaxine (EFFEXOR-ER)  "150 MG TB24 24 hr tablet Take 1 tablet (150 mg) by mouth daily (with breakfast)  Qty: 30 each, Refills: 0    Associated Diagnoses: Mild major depression (H); Diarrhea      Cholecalciferol (VITAMIN D3) 3000 UNITS TABS Take 3,000 Int'l Units by mouth daily  Qty: 30 tablet, Refills: 3    Associated Diagnoses: Vitamin D deficiency      !! order for DME Equipment being ordered: x2 Wearease compression shirt  Qty: 1 each, Refills: 0    Associated Diagnoses: Secondary lymphedema      !! order for DME Equipment being ordered: 20-30mmHg compression sleeve and 20-30mmHg compression glove\" x2 pair  Qty: 1 Units, Refills: 0    Associated Diagnoses: Lymphedema of left arm      rOPINIRole (REQUIP) 0.25 MG tablet Take 1 tablet (0.25 mg) by mouth At Bedtime  Qty: 30 tablet, Refills: 11    Comments: File until needed  Associated Diagnoses: Restless leg syndrome       !! - Potential duplicate medications found. Please discuss with provider.      STOP taking these medications       predniSONE (DELTASONE) 10 MG tablet Comments:   Reason for Stopping:         aspirin 81 MG EC tablet Comments:   Reason for Stopping:         zonisamide (ZONEGRAN) 25 MG capsule Comments:   Reason for Stopping:             Allergies   Allergies   Allergen Reactions     Compazine [Prochlorperazine] Fatigue     Fentanyl Other (See Comments)     sweating     Erythrocin Nausea and Vomiting     Zithromax [Azithromycin Dihydrate] Diarrhea[JM1.2]       Consultations This Hospital Stay   Consultation during this admission received from:[JM1.1]    CARE TRANSITION RN/SW IP CONSULT  PHYSICAL THERAPY ADULT IP CONSULT    Significant Results and Procedures[JM1.2]   Procedures    None.[JM1.1]    Data[JM1.2]   Results for orders placed or performed during the hospital encounter of 03/04/18   CT Abdomen Pelvis w Contrast    Narrative    CT ABDOMEN AND PELVIS WITH CONTRAST 3/4/2018 12:16 PM     HISTORY: Bloody diarrhea/abdominal pain, history of melanoma, axilla  surgery in " October 2017.       COMPARISON: PET/CT 10/11/2017.    TECHNIQUE: Axial images from the lung bases to the symphysis are  performed with additional coronal reformatted images. 100 mL of Isovue  370 are given intravenously.  Radiation dose for this scan was reduced  using automated exposure control, adjustment of the mA and/or kV  according to patient size, or iterative reconstruction technique.    FINDINGS:   The lung bases are clear.    Abdomen: Tiny cyst measuring less than 1 cm is present in the left  hepatic dome. This was present on a prior contrast-enhanced CT from  2012 and is therefore considered a benign cyst. The liver is otherwise  unremarkable. The spleen, gallbladder, fatty replaced pancreas,  adrenal glands, and kidneys are unremarkable. No hydronephrosis. No  enlarged lymph nodes. The bowel is normal in caliber without  obstruction or diverticulitis. Appendix is normal.    Pelvis: The bladder, uterus, and left ovary are unremarkable. Probable  dominant follicle right ovary is noted. No enlarged pelvic or inguinal  lymph nodes. There is colonic wall thickening and mucosal enhancement  involving the rectum and distal sigmoid colon with surrounding  perirectal adenopathy. In the surrounding mesorectal fat, small pelvic  sidewall lymph nodes are also noted. Findings could reflect an  infectious coloproctitis versus an inflammatory bowel disease such as  ulcerative colitis. Bone window examination is unremarkable.      Impression    IMPRESSION:  1. Probable infectious or inflammatory proctocolitis as described.  Surrounding mesorectal lymph nodes raise the possibility of a more  chronic inflammatory process such as ulcerative colitis. Underlying  mass should likely be excluded with follow-up colonoscopy if not  previously performed.  2. Simple cyst left hepatic lobe unchanged dating back to at least  2012. No further follow-up required. Abdominal and pelvic organs are  otherwise within normal limits.    ALEX  "MD MIGUEL   US leg bilateral venous    Narrative    VENOUS ULTRASOUND BOTH LEGS  3/6/2018 8:36 PM     HISTORY: rule out DVT;     COMPARISON: None.    FINDINGS: Examination of the deep veins with graded compression and  color flow Doppler with spectral wave form analysis shows  no evidence  of thrombus in the common femoral vein, femoral vein, popliteal vein  or calf veins.  There is no venous insufficiency.      Impression    IMPRESSION: No DVT is identified in either lower extremity    JD HAYES MD[JM1.1]       History of Present Illness[JM1.2]   Doretha Fernandez is a 43 year old female who presents with worsening bloody stools over the past 2 weeks.     She states 2 weeks ago after her last infusion she started having diarrhea mixed with blood and pus. This has been ongoing and worsened 5 days ago and is now mostly blood with pus/mucus. She tested positive for giardia on 2/23/2018 and was treated with a dose of tinidazole. Repeat testing was negative. She did not notice much improvement in her symptoms. She is undergoing Opdivo infusions since November, which have been held due to concern that could be a side effect.      She reports that the department of public health called her and notified her that they believed her incubation period was around January 28th, she reports eating out often at that time. No recent travel. No other recent antibiotics.     Reports lower left quadrant abdominal pain which she describes as \"uncomortable\". She is also having worse intermittent left-sided sharp pains which last a few seconds. Worse with oral intake. She is hungry but has not been eating much because this immediately causes her to have a bowel movement. Reports nausea but no emesis.     Reports an occipital headache which wraps around to the frontal area. Feels \"dull\". Similar to her past headaches. Tried acetaminophen without much relief.      Reports chills and night sweats which have been intermittent since starting " the infusions in November, no change from baseline.     Reports recent rash/redness in her face and chest due to prednisone which was started by her oncologist. Has been resolving since stopping prednisone.      Denies fever, myalgias, lightheadedness, dizziness, sore throat, rhinorrhea, congestion, cough, shortness of breath, chest pain, palpitations, dysuria, changes in urinary frequency/urgency or wounds.[JM1.1]    Hospital Course[JM1.2]   Doretha Fernandez was admitted on 3/4/2018.  The following problems were addressed during her hospitalization:    Persistent bloody stools with mucus and diarrhea, Intestinal giardiasis  Has now had nearly three weeks of worsening bloody mucus and diarrhea. Underwent stool studies 2/22/18, positive for Giardia antigen.  Was treated with 1 dose of tinidazole for positive Giardia 2/23/2018 (no other antibiotics recently); repeat giardia 2/28/2018 was negative.  However, repeat stool for Giardia antigen 3/3/18, done at this hospital admission, is again positive for Giardia.  Prescribed steroid taper by Dr. 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  CT abdomen/pelvis 3/4/2018 shows colonic wall thickening and mucosal enhancement involving rectum and distal sigmoid consistent with infectious coloprotitis vs inflammatory bowel disease.  Fecal lactoferrin has also been positive on two occasions, though this is a non-specific lab finding.  C diff negative most recently 3/4/18.  Differential diagnosis appears to be primarily persistent Giardiasis vs colitis as a side effect of Opdivo (package information describes an incidence of 2 to 3%).     Still having hematochezia, reports two or three episodes so far today.  Specimens are being saved in a hat, I saw one yesterday; she is passing a maroon/brown, mucous consistency  substance, total amount in the hat after one BM is probably 4 Tbsp.  As of yesterday, Hgb was down only 0.9 g from admission, so it doesn't look like she's losing a lot of blood at this point.  Still having abdominal pain requiring oxycodone and hydromorphone, but has been able to eat a full liquid diet.  She is concerned that she is still having these GI symptoms despite being on MNZ for the past 5 days.  I advised her that rapid resolution of abdominal and GI symptoms may not be a realistic expectation, and reminded her that GI recommends three weeks of MNZ therapy for a reason.  She cried when I mentioned that she might be symptomatic for a week or two longer.  Patient advised me yesterday that she wished to be transferred to the [facility] for ongoing care, as she would be able to see the GI team.  I contacted the GI phone consult physician yesterday afternoon to apprise him of this patient's illness and to discuss possible transfer.  It turns out that the GI physician I talked to was somewhat familiar with her care, as he had performed phone consult in the ED on the day of admission.  We reviewed the current treatment; he recommended that steroids continue to be withheld, and that  mg Q8H continue.  He also recommended that endoscopy with biopsy should be performed now rather than as an outpatient, to see if a tissue diagnosis for colitis etiology can be made.  I suggested transfer to [facility] for endoscopy; GI recommended instead that I consult general surgery here to request endoscopy with biopsy, and GI physician can advise regarding the results.  I had Surgery on-call paged to our office and then my cell phone, but we did not get a response.    I spoke to Dr. Richards, patient's Oncologist, face to face about this patient on 3/7/18.  He recommended early endoscopy with biopsy, and thought transfer to [facility] would be reasonable.    Patient's condition did not change overnight, and she continued to endorse  transfer.  I spoke with the Hospitalist on-call for transfers at [facility] (I have misplaced the name; darby), who suggested we also speak with the Oncology on-call for transfers, Dr. German.  The three of us discussed indications for transfer: lack of a GI inpatient service here, the need for endoscopy without a means of expediting it here, and a complex patient with a highly unusual case.  They concurred with transfer.  Patient is very appreciative.     Plan:                         - Will continue  mg Q8H for a recommended 14 days of treatment, i.e. through 3/17/18, for treatment of Giardia.  Can convert to PO when less nauseated.                         - Prednisone is stopped as above.                         - Anticipate endoscopy with biopsy will be done within the next couple of days.                         - Continue abdominal pain management with oxycodone and/or hydromorphone.                           - Continue diet as tolerated, but will continue IVF until PO intake normalizes.      Headache with history of migraines  Occipital headache that wraps around to frontal. Similar to prior migraines. Reports daily migraines stopped after diagnosis of her melanoma.  Had stopped all migraine medications and had not had headache recurrence until day of admission.  Does not report headache today.  - Watch for headache recurrence.        Metastatic malignant melanoma (H)  Diagnosed on lymph node biopsy in 10/2017. Primary site not identified. Follows with Dr. Richards. Last seen 2/22/2018. On immunotherapy with Opdivo (nivolumab) since November every 2 weeks, which was recently held due to persistent diarrhea. Reports on treatment 8 of planned 26.  Patient very concerned that current GI illness will exclude her from future Opdivo treatments.  Dr. Richards is aware of patient's admission, and has stopped by to see her.      Mild major depression  Anxiety  Depression is worsened due to current illness, separation  "from her kids, concern about her medical future due to melanoma, and she and her ex- are experiencing friction as he is the primary caregiver while she is hospitalized.  Has been tearful for the past few days.  - Continues on home venlafaxine.  Improvement in health status would probably help a lot.      Mild intermittent asthma  Well-controlled. Not on home medications.  Current lung exam is normal.      Prothrombin Gene Mutation  History of stroke  Follows with Dr. Richards. History of stroke/TIA following both pregnancies. Hypercoagulability work up positive for prothrombin gene mutation. No history of DVT/PE.  - Hold home ASA given rectal bleeding.     Bilateral leg cramps  Describes sharp \"cramps\" circumferentially of both legs, from mid-thigh distally.  Not relieved by ambulation.  Is again her chief complaint today.  Exam is unremarkable.  Bilateral venous imaging 3/6/18 was negative for DVT.  Electrolytes, Mg, CPK were normal.  Trial of pramipexole was ineffective.  PT helps a little, but not much.  I don't know the etiology of this.  - Will treat with analgesics for now, and continue PT efforts.[JM1.1]    Pending Results   Unresulted Labs Ordered in the Past 30 Days of this Admission     No orders found from 1/3/2018 to 3/5/2018.          Physical Exam   Temp:  [97.9  F (36.6  C)-98.4  F (36.9  C)] 98.4  F (36.9  C)  Pulse:  [58-88] 88  Resp:  [16-18] 16  BP: (108-124)/(57-77) 116/74  SpO2:  [93 %-100 %] 93 %  Vitals:    03/04/18 1029 03/04/18 1632   Weight: 95.3 kg (210 lb) 96.5 kg (212 lb 11.9 oz)[JM1.2]       GENERAL: Pleasant woman, lying in bed, appeared to be dozing off but awoke to be alert and conversant.   EYES: Eyes grossly normal to inspection, extraocular movements intact.  HENT: Nares patent bilaterally.  Nasal mucosa normal, no discharge.    NECK: Trachea midline, no stridor.    RESP: No accessory muscle use.  Symmetrical breath sounds.  Lungs clear anteriorly on inspiration and " expiration.  Expiration not prolonged, no wheeze.  CV: Regular rate and rhythm, non-tachycardic.  Normal S1 S2, no murmur or extra sound.  No lower extremity edema.  ABDOMEN: Mildly protuberant, soft, describes tenderness to light palpation over all fields, but no guarding.  Liver and spleen not enlarged, no masses palpable.  Bowel sounds positive.  MS: No bony deformities noted.  No red or inflamed joints.  Unremarkable exam of both legs.  SKIN: Warm and dry, no rashes where skin visible.  NEURO: Alert, oriented, conversant.  Cranial nerves II - XII grossly intact.  No gross motor or sensory deficits.  Gait not assessed.  PSYCH: Alert, conversant.  Able to articulate logical thoughts, no tangential thoughts, no hallucinations or delusions.  Tearful again, describing worries regarding current illness and future health.       The discharge plan was discussed with the patient and with consultants from [facility], whose help is greatly appreciated, see above for details.    Total time on this discharge was 80 minutes, including multiple discussions with [facility] consultants.    Jeffry Yeh[JM1.1]                 Revision History        User Key Date/Time User Provider Type Action    > JM1.2 3/8/2018 12:33 PM Jeffry Yeh MD Physician Sign     JM1.1 3/8/2018 12:08 PM Jeffry Yeh MD Physician                      Consult Notes      Consults by Emilia Lopez RN at 3/5/2018  1:02 PM     Author:  Emilia Lopez RN Service:  (none) Author Type:      Filed:  3/5/2018  1:02 PM Date of Service:  3/5/2018  1:02 PM Creation Time:  3/5/2018  1:00 PM    Status:  Signed :  Emilia Lopez RN ()     Consult Orders:    1. Care Transition RN/SW IP Consult [[id number]] ordered by Jeffry Yeh MD at 03/05/18 1300                Care Transition Initial Assessment - RN  Reason For Consult: discharge planning   Met with: Patient.    DATA   Principal Problem:    Rectal  bleeding  Active Problems:    Mild major depression (H)    Metastatic malignant melanoma (H)    Diarrhea       Primary Care Clinic [facility]: [facility]  Primary Care MD Name: Anna Naidu  Contact information and PCP information verified: Yes    ASSESSMENT  Cognitive Status: awake, alert and oriented.             Lives With: child(gold), dependent  Living Arrangements: house     Description of Support System: Supportive, Involved   Who is your support system?: Parent(s)   Support Assessment: Adequate family and caregiver support   Insurance Concerns: No Insurance issues identified      This writer met with pt, introduced self and role. Discussed discharge planning and Medicare guidelines in regards to home care and SNF benefits. Patient lives independently with her two daughters ages 10 and 13 and works at the [employer].     PLAN    Patient will return home on discharge      Discharge Planner   Discharge Plans in progress: Home  Barriers to discharge plan: medical stability  Follow up plan: Handoff to [facility]       Entered by: VICTOR MANUEL PINEDA 03/05/2018 1:00 PM         MAYRA Ledezma, RN, Care Coordinator  [facility] [phone]  [facility] [phone][JM1.1]     Revision History        User Key Date/Time User Provider Type Action    > JM1.1 3/5/2018  1:02 PM Emilia Lopez, RN Case Manager Sign                     Progress Notes - Physician (Notes from 03/05/18 through 03/08/18)      Progress Notes by Unruly Boland MD at 3/7/2018 11:48 PM     Author:  Unruly Boland MD Service:  Hospitalist Author Type:  Physician    Filed:  3/7/2018 11:50 PM Date of Service:  3/7/2018 11:48 PM Creation Time:  3/7/2018 11:48 PM    Status:  Signed :  Unruly Boland MD (Physician)         [facility]    Hospital Medicine   Cross Cover Note  Date of Service: 3/7/2018     I was called due to severe leg cramps. Worked up today per Dr. Yeh noted and no findings. Appears patient was to start  mirapex at home so will try tonight.   Unruly Boland MD  [facility] Medicine[JH1.1]         Revision History        User Key Date/Time User Provider Type Action    > JH1.1 3/7/2018 11:50 PM Unruly Boland MD Physician Sign            Progress Notes by Cara Jenkins PT at 3/7/2018  5:02 PM     Author:  Cara Jenkins PT Service:  (none) Author Type:  Physical Therapist    Filed:  3/7/2018  5:04 PM Date of Service:  3/7/2018  5:02 PM Creation Time:  3/7/2018  5:02 PM    Status:  Signed :  Cara Jenkins PT (Physical Therapist)            03/07/18 1600   Quick Adds   Type of Visit Initial PT Evaluation   Living Environment   Lives With child(gold), dependent   Living Arrangements house   Home Accessibility stairs to enter home;stairs within home   Number of Stairs to Enter Home 3   Number of Stairs Within Home 7   Stair Railings at Home inside, present on left side   Functional Level Prior   Ambulation 0-->independent   Transferring 0-->independent   Toileting 0-->independent   Bathing 0-->independent   Dressing 0-->independent   Eating 0-->independent   Communication 0-->understands/communicates without difficulty   Swallowing 0-->swallows foods/liquids without difficulty   Cognition 0 - no cognition issues reported   Prior Functional Level Comment PLOF- Pt indep. with ambulation with no device.    General Information   Onset of Illness/Injury or Date of Surgery - Date 03/06/18   Referring Physician Rao   Patient/Family Goals Statement Pt w/ goal  of decreasing her pain   Pertinent History of Current Problem (include personal factors and/or comorbidities that impact the POC) 43 year old female with PMH metastatic melanoma, depression/anxiety, TIA, migraines and thrombosis disorder who presented 3/4/18 with bloody stools x 2 weeks. admitted  with persistent bloody stools   General Observations Pt alert, pleasant- reports pain in bilateral LES - diffuse[CS1.1] non dermatomal[CS1.2]  Pain.[CS1.1]-[CS1.2] no   "alleviating factors.   Pain moderate, severe at times   Pain Assessment   Patient Currently in Pain Yes, see Vital Sign flowsheet   Range of Motion (ROM)   ROM Comment WFL. No pain w/ ROM. Hamstring length to 45 degrees bi[CS1.1]laterally[CS1.2]  w/o SX   Strength   Strength Comments Good LE strength. No c/o weakness   Bed Mobility   Bed Mobility Comments indep.   Transfer Skills   Transfer Comments indep.   Gait   Gait Comments Indep.= pt amb. 120 feet[CS1.1] x2[CS1.2] w/ o no device,[CS1.1]st[CS1.2]nishant gait, good rate, able to navigate steps w/ unilate[CS1.1]r[CS1.2]al support with carryover pattern   Balance   Balance Comments good   Sensory Examination   Sensory Perception no deficits were identified   Clinical Impression   Criteria for Skilled Therapeutic Intervention evaluation only   Risk & Benefits of therapy have been explained Yes   Patient, Family & other staff in agreement with plan of care Yes   Clinical Impression Comments Evaluation only.  Pt scored 24[CS1.1]/24[CS1.2] on 6 clicks[CS1.1] B[CS1.2]asic mobility assessment, to continue amb. w/ nursing staff while hospitalized.- encouraged pt to amb as able, monitor Sx.   SX constant and do not increase w/ amb. HEP not issued as no deficits noted w/ strength, ROM.  Unclear etiology to leg pain-  Pt to discuss w/ MD   [facility] AM-PAC  \"6 Clicks\" V.2 Basic Mobility Inpatient Short Form   1. Turning from your back to your side while in a flat bed without using bedrails? 4 - None   2. Moving from lying on your back to sitting on the side of a flat bed without using bedrails? 4 - None   3. Moving to and from a bed to a chair (including a wheelchair)? 4 - None   4. Standing up from a chair using your arms (e.g., wheelchair, or bedside chair)? 4 - None   5. To walk in hospital room? 4 - None   6. Climbing 3-5 steps with a railing? 4 - None   Basic Mobility Raw Score (Score out of 24.Lower scores equate to lower levels of function) 24   Total Evaluation " Time   Total Evaluation Time (Minutes) 20[CS1.1]        Revision History        User Key Date/Time User Provider Type Action    > CS1.1 3/7/2018  5:04 PM Cara Jenkins, PT Physical Therapist Sign     CS1.2 3/7/2018  5:02 PM Cara Jenkins, PT Physical Therapist             Progress Notes by Jeffry Yeh MD at 3/7/2018  3:05 PM     Author:  Jeffry Yeh MD Service:  Hospitalist Author Type:  Physician    Filed:  3/7/2018  3:10 PM Date of Service:  3/7/2018  3:05 PM Creation Time:  3/7/2018  3:05 PM    Status:  Signed :  Jeffry Yeh MD (Physician)         INTERVAL NOTE    Discussed face to face this patient's case with Dr. Richards.  Dr. Richards feels that endoscopy is warranted, as he has at least a moderate clinical suspicion that the patient's colitis may be due to Opdivo toxicity rather than Giardia.    I therefore contacted the GI On-Call line at [facility].  Physician on-call, as it turns out, is aware of this patient's case; the ED physician had contacted him on day of admission.  I updated on-call doc about clinical progress thus far, including ongoing abdominal symptoms and maroon mucous-like BM.  On-call physician concurs that endoscopy with biopsy is warranted at this point.  He advises that I should contact general surgery here at [facility] to request endoscopy with biopsy, so I have a page into Dr. Lin to discuss this.    On-call physician recommends continuation of  mg Q8H, and that we continue to withhold systemic steroids, until endoscopy results are available.    Jeffry Yeh MD  Hospitalist[JM1.1]     Revision History        User Key Date/Time User Provider Type Action    > JM1.1 3/7/2018  3:10 PM Jeffry Yeh MD Physician Sign            Progress Notes by Jeffry Yeh MD at 3/7/2018 10:17 AM     Author:  Jeffry Yeh MD Service:  Hospitalist Author Type:  Physician    Filed:  3/7/2018 10:55 AM Date of Service:  3/7/2018 10:17 AM Creation Time:   3/7/2018 10:17 AM    Status:  Signed :  Jeffry Yeh MD (Physician)         [facility]    Hospital Medicine Progress Note  Date of Service: 03/07/2018    Assessment & Plan   Doretha Fernandez is a 43 year old female with PMH metastatic melanoma, depression/anxiety, TIA, migraines and thrombosis disorder who presented 3/4/18 with bloody stools x 2 weeks.    Persistent bloody stools with mucus and diarrhea, Intestinal giardiasis  Has had two weeks of worsening bloody mucus and diarrhea. Underwent stool studies 2/22/18, positive for Giardia antigen.  Was treated with 1 dose of tinidazole for positive Giardia 2/23/2018 (no other antibiotics recently); repeat giardia 2/28/2018 was negative.  However, repeat stool for Giardia antigen 3/3/18, done at this hospital admission, is again positive for Giardia.  Prescribed steroid taper by Dr. 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  CT abdomen/pelvis 3/4/2018 shows colonic wall thickening and mucosal enhancement involving rectum and distal sigmoid consistent with infectious coloprotitis vs inflammatory bowel disease.  Fecal lactoferrin has also been positive on two occasions, though this is a non-specific lab finding.  C diff negative most recently 3/4/18.  Differential diagnosis appears to be primarily persistent Giardiasis vs colitis as a side effect of Opdivo (package information describes an incidence of 2 to 3%).    Still having hematochezia, reports three episodes so far today.  Specimens are being saved in a hat; she is passing a maroon/brown, mucous consistency substance, total amount in the hat after one BM is probably 4 Tbsp.  As of yesterday, Hgb was down only 0.7 g from admission, so it doesn't look like she's losing a lot of blood at this point.  Still having  abdominal pain requiring oxycodone and hydromorphone, but has been able to eat a full liquid diet.  She is concerned that she is still having these GI symptoms despite being on MNZ for the past 4 days.  I advised her that rapid resolution of abdominal and GI symptoms may not be a realistic expectation, and reminded her that GI recommends three weeks of MNZ therapy for a reason.  She cried when I mentioned that she might be symptomatic for a week or two longer.    Plan:   - Per [facility] phone recommendations, will continue  mg Q8H for a recommended 14 days of treatment, i.e. through 3/17/18, for treatment of Giardia.  Can convert to PO when less nauseated.   - Prednisone is stopped as above.   - GI recommends outpatient flex sig with biopsy, particularly if symptoms persist despite MNZ.  Could request flex sig earlier if hematochezia or other symptoms worsen or if develops evidence of systemic infection.  Patient is concerned about duration of her symptoms, requests that I discuss with GI team the possibility of transfer.  I will do so today or tomorrow, but due to her general clinical stability, I'm not sure they'll advise transfer, which I told her.   - Continue abdominal pain management with oxycodone and/or hydromorphone.     - Advance diet as tolerated, but will continue IVF until PO intake normalizes.     Headache with history of migraines  Occipital headache that wraps around to frontal. Similar to prior migraines. Reports daily migraines stopped after diagnosis of her melanoma.  Had stopped all migraine medications and had not had headache recurrence until day of admission.  Does not report headache today.  - Watch for headache recurrence.       Metastatic malignant melanoma (H)  Diagnosed on lymph node biopsy in 10/2017. Primary site not identified. Follows with Dr. Richards. Last seen 2/22/2018. On immunotherapy with Opdivo (nivolumab) since November every 2 weeks, which was recently held due to  "persistent diarrhea. Reports on treatment 8 of planned 26.  Patient very concerned that current GI illness will exclude her from future Opdivo treatments.  Since we are attributing ongoing diarrhea and hematochezia to Giardia, I don't think this will be the case, but will obviously defer decision to Dr. Ghosh.     Mild major depression  Anxiety  Depression is worsened due to current illness, separation from her kids, concern about her medical future due to melanoma, and she and her ex- are experiencing friction as he is the primary caregiver while she is hospitalized.  Was tearful throughout this entire visit.  - Continues on home venlafaxine.  I'm going to discuss with Spiritual Care; maybe conversation and listening will help.     Mild intermittent asthma  Well-controlled. Not on home medications.  Current lung exam is normal.     Prothrombin Gene Mutation  History of stroke  Follows with Dr. Richards. History of stroke/TIA following both pregnancies. Hypercoagulability work up positive for prothrombin gene mutation. No history of DVT/PE.  - Hold home ASA given rectal bleeding.    Bilateral leg cramps  Started overnight, describes sharp \"cramps\" circumferentially of both legs, from mid-thigh distally.  Not relieved by ambulation.  Is her chief complaint today.  Exam is unremarkable[JM1.1].  Bilateral venous imaging 3/6/18 was negative for DVT.[JM1.2]  - Will send Mg, electrolytes, and CPK.  - Begin PT for ambulation, stretching, ROM.          DVT Prophylaxis: Pneumatic Compression Devices until hematochezia stopped, though I would like to get her on medical prophylaxis (enoxaparin) as soon as we can due to her prothrombin gene mutation.  Code Status: Full Code    Lines: Peripheral IV.   Major catheter: Not needed.    Discussion: As above. Continues to require inpatient care.    Disposition: Anticipate discharge in 3 to 5 days.     Attestation:  Amount of time performed on this hospital visit: 35 minutes, 20 of " "which were spent in care coordination and counseling.    Jeffry Yeh       Interval History[JM1.1]   Described above.  Her abdominal symptoms are much the same, aside from the fact that she is able to eat a bit more now.  She is very worried and tearful today: \"Something just isn't right, this isn't getting any better\".[JM1.2]    ROS:  CONSTITUTIONAL: NEGATIVE for chills, fever, sweats.  EYES: NEGATIVE for visual changes, eye irritation.  ENT/MOUTH: NEGATIVE for nasal congestion, postnasal drainage or sinus pressure.  RESP: NEGATIVE for dyspnea, cough, wheeze, or respiratory chest pain.  CV: NEGATIVE for chest pain, palpitations, orthopnea, or lower extremity edema.  GI: See above.  : NEGATIVE for dysuria or flank pain.  MUSCULOSKELETAL: NEGATIVE for back pain, joint pain, or joint swelling.[JM1.1]  See above re leg cramps.[JM1.2]  NEURO: NEGATIVE for focal numbness or weakness, syncope, stroke or seizure.  PSYCHIATRIC:[JM1.1] Anxiety and depression are exacerbated by current illness, details above.[JM1.2]      Physical Exam   Temp:  [97.4  F (36.3  C)-98.4  F (36.9  C)] 98.4  F (36.9  C)  Pulse:  [81-88] 88  Resp:  [18] 18  BP: (104-117)/(61-69) 111/69  SpO2:  [92 %-94 %] 94 %    Weights:   Vitals:    03/04/18 1029 03/04/18 1632   Weight: 95.3 kg (210 lb) 96.5 kg (212 lb 11.9 oz)    Body mass index is 37.69 kg/(m^2).    GENERAL: Pleasant woman[JM1.1], lying in bed, appeared to be dozing off but awoke to be alert and conversant.[JM1.2]   EYES: Eyes grossly normal to inspection, extraocular movements intact.  HENT: Nares patent bilaterally.  Nasal mucosa normal, no discharge.    NECK: Trachea midline, no stridor.    RESP: No accessory muscle use.  Symmetrical breath sounds.  Lungs clear anteriorly on inspiration and expiration.  Expiration not prolonged, no wheeze.  CV: Regular rate and rhythm, non-tachycardic.  Normal S1 S2, no murmur or extra sound.  No lower extremity edema.  ABDOMEN: Soft,[JM1.1] " describes[JM1.2] tenderness to light palpation over all fields, but no guarding.  Liver and spleen not enlarged, no masses palpable.  Bowel sounds positive.  MS: No bony deformities noted.  No red or inflamed joints.  SKIN: Warm and dry, no rashes where skin visible.  NEURO: Alert, oriented, conversant.  Cranial nerves II - XII grossly intact.  No gross motor or sensory deficits.  Gait not assessed.  PSYCH:[JM1.1] A[JM1.2]lert, conversant.  Able to articulate logical thoughts, no tangential thoughts, no hallucinations or delusions.[JM1.1]  Tearful throughout most of visit, describing worries regarding current illness and future health, problems with her .[JM1.2]        Data     Recent Labs  Lab 03/06/18  0545 03/05/18  0925 03/04/18  1130 03/03/18  0813   WBC 8.4 8.4 11.2* 12.8*   HGB 11.3* 11.4* 12.0 12.0   MCV 86 86 84 86    329 364 371     --  139 142   POTASSIUM 3.6  --  3.8 3.4   CHLORIDE 104  --  106 109   CO2 29  --  28 26   BUN 8  --  10 13   CR 0.65  --  0.62 0.69   ANIONGAP 5  --  5 7   PATRICIA 7.9*  --  8.3* 8.5   GLC 91  --  67* 118*   ALBUMIN  --   --  3.3* 3.5   PROTTOTAL  --   --  7.0 7.3   BILITOTAL  --   --  0.1* 0.1*   ALKPHOS  --   --  65 63   ALT  --   --  60* 32   AST  --   --  39 17         Recent Labs  Lab 03/06/18  0545 03/04/18  1130 03/03/18  0813 03/01/18  1450   GLC 91 67* 118* 167*        Unresulted Labs Ordered in the Past 30 Days of this Admission     No orders found from 1/3/2018 to 3/5/2018.           Imaging  Recent Results (from the past 24 hour(s))   US leg bilateral venous    Narrative    VENOUS ULTRASOUND BOTH LEGS  3/6/2018 8:36 PM     HISTORY: rule out DVT;     COMPARISON: None.    FINDINGS: Examination of the deep veins with graded compression and  color flow Doppler with spectral wave form analysis shows  no evidence  of thrombus in the common femoral vein, femoral vein, popliteal vein  or calf veins.  There is no venous insufficiency.      Impression     "IMPRESSION: No DVT is identified in either lower extremity    JD HAYES MD        I reviewed all new labs and imaging results over the last 24 hours. I personally reviewed no images or EKG's today.    Medications     lactated ringers 125 mL/hr at 03/07/18 0345       venlafaxine  150 mg Oral Daily with breakfast     metroNIDAZOLE  500 mg Intravenous Q8H       Jeffry Yeh[JM1.1]            Revision History        User Key Date/Time User Provider Type Action    > JM1.2 3/7/2018 10:55 AM Jeffry Yeh MD Physician Sign     JM1.1 3/7/2018 10:17 AM Jeffry Yeh MD Physician             Progress Notes by Julia Cuevas RN at 3/6/2018  6:41 PM     Author:  Cuevas, Julia, RN Service:  Emergency Medicine Author Type:  Registered Nurse    Filed:  3/6/2018  6:44 PM Date of Service:  3/6/2018  6:41 PM Creation Time:  3/6/2018  6:41 PM    Status:  Signed :  Julia Cuevas RN (Registered Nurse)         Pt reported having an emesis following a full liquid dinner tonight as well as passing \"just blood\" per rectum   Asked pt not to flush the toilet and to save all stools, emesis for nurse to view. Medicated with Zofran for nausea.   Pt is resting comfortably, talking on telephone.[JH1.1]     Revision History        User Key Date/Time User Provider Type Action    > JH1.1 3/6/2018  6:44 PM Julia Cuevas RN Registered Nurse Sign            Progress Notes by Jeffry Yeh MD at 3/6/2018  3:10 PM     Author:  Jefrfy Yeh MD Service:  Hospitalist Author Type:  Physician    Filed:  3/6/2018  3:21 PM Date of Service:  3/6/2018  3:10 PM Creation Time:  3/6/2018  3:10 PM    Status:  Signed :  Jeffry Yeh MD (Physician)         [facility] Progress Note  Date of Service: 03/06/2018    Assessment & Plan   Doretha Fernandez is a 43 year old female with PMH metastatic melanoma, depression/anxiety, TIA, migraines and thrombosis disorder who " presented 3/4/18 with bloody stools x 2 weeks.    Principal Problem:    Rectal bleeding  Active Problems:    Mild major depression (H)    Metastatic malignant melanoma (H)    Diarrhea    Intestinal giardiasis    Persistent bloody stools with mucus and diarrhea  Has had two weeks of worsening bloody mucus and diarrhea. Underwent stool studies 2/22/18, positive for Giardia antigen.  Was treated with 1 dose of tinidazole for positive Giardia 2/23/2018 (no other antibiotics recently); repeat giardia 2/28/2018 was negative.  However, repeat stool for Giardia antigen 3/3/18, done at this hospital admission, is again positive for Giardia.  Prescribed steroid taper by Dr. 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  CT abdomen/pelvis 3/4/2018 shows colonic wall thickening and mucosal enhancement involving rectum and distal sigmoid consistent with infectious coloprotitis vs inflammatory bowel disease.  Fecal lactoferrin has also been positive on two occasions, though this is a non-specific lab finding.  C diff negative most recently 3/4/18.  Differential diagnosis appears to be primarily persistent Giardiasis vs colitis as a side effect of Opdivo (package information describes an incidence of 2 to 3%).    Still having hematochezia several times so far today, but Hgb down only 0.7 g from admission.  Still having abdominal pain requiring oxycodone and hydromorphone, but has been able to eat last two meals (full liquid).    Plan:   - Continue  mg Q8H for a recommended 14 days of treatment, i.e. through 3/17/18, for treatment of Giardia.  Can convert to PO when less nauseated.   - Prednisone is stopped as above.   - GI recommends outpatient flex sig with biopsy, particularly if symptoms persist despite MNZ.  Could request flex sig earlier if hematochezia or  other symptoms worsen or if develops evidence of systemic infection.   - Continue abdominal pain management with oxycodone and/or hydromorphone.     - Advance diet as tolerated, but will continue IVF until PO intake improves.     Headache with history of migraines  Occipital headache that wraps around to frontal. Similar to prior migraines. Reports daily migraines stopped after diagnosis of her melanoma.  Had stopped all migraine medications and had not had headache recurrence until day of admission.  Does not report headache today.  - Watch for headache recurrence.       Metastatic malignant melanoma (H)  Diagnosed on lymph node biopsy in 10/2017. Primary site not identified. Follows with Dr. Richards. Last seen 2/22/2018. On immunotherapy with Opdivo (nivolumab) since November every 2 weeks, which was recently held due to persistent diarrhea. Reports on treatment 8 of planned 26.  Patient very concerned that current GI illness will exclude her from future Opdivo treatments.  Since we are attributing ongoing diarrhea and hematochezia to Giardia, I don't think this will be the case, but will obviously defer decision to Dr. Ghosh.     Mild major depression  Anxiety  Stable.  - Continues on home venlafaxine     Mild intermittent asthma  Well-controlled. Not on home medications.  Current lung exam is normal.     Prothrombin Gene Mutation  History of stroke  Follows with Dr. Richards. History of stroke/TIA following both pregnancies. Hypercoagulability work up positive for prothrombin gene mutation. No history of DVT/PE.  - Hold home ASA given rectal bleeding.       DVT Prophylaxis: Pneumatic Compression Devices until hematochezia stopped, though I would like to get her on medical prophylaxis (enoxaparin) as soon as we can due to her prothrombin gene mutation.  Code Status: Full Code    Lines: Peripheral IV.   Major catheter: Not needed.    Discussion: As above. Continues to require inpatient care.    Disposition: Anticipate  "discharge in 3 to 5 days.     Attestation:  Amount of time performed on this hospital visit: 35 minutes, 20 of which were spent in care coordination and counseling.    Jeffry Yeh       Interval History   Continues to have red blood per rectum in small amounts, no stool seen along with it.  Feels \"constipated\".  Has had two \"full liquid diet\" meals with only mild nausea and no emesis.  Abdominal pain remains primarily bilateral lower quadrants, constant, occasionally sharp, without radiation.  Oxycodone or hydromorphone continue to be effective control.    ROS:  CONSTITUTIONAL: NEGATIVE for chills, fever, sweats.  EYES: NEGATIVE for visual changes, eye irritation.  ENT/MOUTH: NEGATIVE for nasal congestion, postnasal drainage or sinus pressure.  RESP: NEGATIVE for dyspnea, cough, wheeze, or respiratory chest pain.  CV: NEGATIVE for chest pain, palpitations, orthopnea, or lower extremity edema.  GI: See above.  : NEGATIVE for dysuria or flank pain.  MUSCULOSKELETAL: NEGATIVE for back pain, joint pain, or joint swelling.  NEURO: NEGATIVE for focal numbness or weakness, syncope, stroke or seizure.  PSYCHIATRIC: Notable for anxiety due to uncertainty of clinical condition, but no panic.      Physical Exam   Temp:  [97.5  F (36.4  C)-99.1  F (37.3  C)] 98.1  F (36.7  C)  Pulse:  [79-84] 81  Resp:  [18] 18  BP: (108-119)/(62-72) 108/62  SpO2:  [94 %-96 %] 94 %    Weights:   Vitals:    03/04/18 1029 03/04/18 1632   Weight: 95.3 kg (210 lb) 96.5 kg (212 lb 11.9 oz)    Body mass index is 37.69 kg/(m^2).    GENERAL: Pleasant woman who appeared to be sleeping comfortably when I came in, and was calm and conversant when awakened.     EYES: Eyes grossly normal to inspection, extraocular movements intact.  HENT: Nares patent bilaterally.  Nasal mucosa normal, no discharge.    NECK: Trachea midline, no stridor.    RESP: No accessory muscle use.  Symmetrical breath sounds.  Lungs clear anteriorly on inspiration and " expiration.  Expiration not prolonged, no wheeze.  CV: Regular rate and rhythm, non-tachycardic.  Normal S1 S2, no murmur or extra sound.  No lower extremity edema.  ABDOMEN: Soft, reports tenderness to light palpation over all fields, but no guarding.  Liver and spleen not enlarged, no masses palpable.  Bowel sounds positive.  MS: No bony deformities noted.  No red or inflamed joints.  SKIN: Warm and dry, no rashes where skin visible.  NEURO: Alert, oriented, conversant.  Cranial nerves II - XII grossly intact.  No gross motor or sensory deficits.  Gait not assessed.  PSYCH: Calm, alert, conversant.  Able to articulate logical thoughts, no tangential thoughts, no hallucinations or delusions.  Affect normal.         Data     Recent Labs  Lab 03/06/18  0545 03/05/18  0925 03/04/18  1130 03/03/18  0813   WBC 8.4 8.4 11.2* 12.8*   HGB 11.3* 11.4* 12.0 12.0   MCV 86 86 84 86    329 364 371     --  139 142   POTASSIUM 3.6  --  3.8 3.4   CHLORIDE 104  --  106 109   CO2 29  --  28 26   BUN 8  --  10 13   CR 0.65  --  0.62 0.69   ANIONGAP 5  --  5 7   PATRICIA 7.9*  --  8.3* 8.5   GLC 91  --  67* 118*   ALBUMIN  --   --  3.3* 3.5   PROTTOTAL  --   --  7.0 7.3   BILITOTAL  --   --  0.1* 0.1*   ALKPHOS  --   --  65 63   ALT  --   --  60* 32   AST  --   --  39 17         Recent Labs  Lab 03/06/18  0545 03/04/18  1130 03/03/18  0813 03/01/18  1450   GLC 91 67* 118* 167*        Unresulted Labs Ordered in the Past 30 Days of this Admission     No orders found from 1/3/2018 to 3/5/2018.           Imaging  No results found for this or any previous visit (from the past 24 hour(s)).     I reviewed all new labs and imaging results over the last 24 hours. I personally reviewed no images or EKG's today.    Medications     lactated ringers 125 mL/hr at 03/06/18 0553       venlafaxine  150 mg Oral Daily with breakfast     metroNIDAZOLE  500 mg Intravenous Q8H       Jeffry Yeh[JM1.1]            Revision History        User  Key Date/Time User Provider Type Action    > JM1.1 3/6/2018  3:21 PM Jeffry Yeh MD Physician Sign            Progress Notes by Jeffry Yeh MD at 3/5/2018  1:50 PM     Author:  Jeffry Yeh MD Service:  Hospitalist Author Type:  Physician    Filed:  3/5/2018  2:24 PM Date of Service:  3/5/2018  1:50 PM Creation Time:  3/5/2018  1:50 PM    Status:  Signed :  Jeffry Yeh MD (Physician)         [facility] Medicine Progress Note  Date of Service:[JM1.1] 03/05/2018    Assessment & Plan[JM1.2]   Doretha Fernandez is a 43 year old female with PMH metastatic melanoma, depression/anxiety, TIA, migraines and thrombosis disorder who presents with bloody stools x 2 weeks.[JM1.1]    Principal Problem:    Rectal bleeding  Active Problems:    Mild major depression (H)    Metastatic malignant melanoma (H)    Diarrhea    Intestinal giardiasis[JM1.2]    Persistent bloody stools with mucus and diarrhea  Has had two weeks of worsening bloody mucus and diarrhea. Underwent stool studies 2/22/18, positive for Giardia antigen.  Was treated with 1 dose of tinidazole for positive Giardia 2/23/2018 (no other antibiotics recently); repeat giardia 2/28/2018 was negative.  However, repeat stool for Giardia antigen 3/3/18, done at this hospital admission, is again positive for Giardia.  Prescribed steroid taper by Dr. 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  CT abdomen/pelvis 3/4/2018 shows colonic wall thickening and mucosal enhancement involving rectum and distal sigmoid consistent with infectious coloprotitis vs inflammatory bowel disease.  Fecal lactoferrin has also been positive on two occasions, though this is a non-specific lab finding.  C diff negative most recently 3/4/18.    Differential  diagnosis appears to be primarily persistent Giardiasis vs colitis as a side effect of Opdivo (package information describes an incidence of 2 to 3%).    Plan:   -  mg Q8H for a recommended 14 days of treatment, i.e. through 3/17/18, for treatment of Giardia.  Can convert to PO when less nauseated.   - Prednisone is stopped as above.   - GI recommends outpatient flex sig with biopsy, particularly if symptoms persist despite MNZ.   - Continue abdominal pain management with oxycodone and/or hydromorphone.     - Advance diet as tolerated, continue IVF until PO intake improves.     Headache with history of migraines  Occipital headache that wraps around to frontal. Similar to prior migraines. Reports daily migraines stopped after diagnosis of her melanoma.  Had stopped all migraine medications and had not had headache recurrence until day of admission.  Does not report headache today.  - Watch for headache recurrence.       Metastatic malignant melanoma (H)  Diagnosed on lymph node biopsy in 10/2017. Primary site not identified. Follows with Dr. Richards. Last seen 2/22/2018. On immunotherapy with Opdivo (nivolumab) since November every 2 weeks, which was recently held due to persistent diarrhea. Reports on treatment 8 of planned 26.  Patient very concerned that current GI illness will exclude her from future Opdivo treatments.  Since we are attributing ongoing diarrhea and hematochezia to Giardia, I don't think this weill be the case, but will obviously defer decision to Dr. Ghosh.     Mild major depression  Anxiety  Stable.  - Continues on home venlafaxine     Mild intermittent asthma  Well-controlled. Not on home medications.  Current lung exam is normal.     Prothrombin Gene Mutation  History of stroke  Follows with Dr. Richards. History of stroke/TIA following both pregnancies. Hypercoagulability work up positive for prothrombin gene mutation. No history of DVT/PE.  - Hold home ASA given rectal  "bleeding.       DVT Prophylaxis: Pneumatic Compression Devices until hematochezia stopped, though I would like to get her on medical prophylaxis (enoxaparin) as soon as we can due to her prothrombin gene mutation.  Code Status:[JM1.1] Full Code[JM1.2]    Lines: Peripheral IV.   Major catheter: Not needed.    Discussion: As above. Continues to require inpatient care.    Disposition: Anticipate discharge in 3 to 5 days.     Attestation:  Amount of time performed on this hospital visit: 44 minutes, 25 of which were spent in care coordination and counseling.    Jeffry Yeh[JM1.1]       Interval History[JM1.2]   Continues to have red blood per rectum in small amounts, no stool seen along with it.  Feels \"constipated\", and is hungry for the first time in a few days.  Abdominal pain is primarily bilateral lower quadrants, constant, occasionally sharp, without radiation.  Oxycodone or hydromorphone are effective control.    ROS:  CONSTITUTIONAL: NEGATIVE for chills, fever, sweats.  EYES: NEGATIVE for visual changes, eye irritation.  ENT/MOUTH: NEGATIVE for nasal congestion, postnasal drainage or sinus pressure.  RESP: NEGATIVE for dyspnea, cough, wheeze, or respiratory chest pain.  CV: NEGATIVE for chest pain, palpitations, orthopnea, or lower extremity edema.  GI: See above.  : NEGATIVE for dysuria or flank pain.  MUSCULOSKELETAL: NEGATIVE for back pain, joint pain, or joint swelling.  NEURO: NEGATIVE for focal numbness or weakness, syncope, stroke or seizure.  PSYCHIATRIC: Notable for anxiety due to uncertainty of clinical condition, but no panic.[JM1.1]      Physical Exam   Temp:  [97.1  F (36.2  C)-98.8  F (37.1  C)] 97.7  F (36.5  C)  Pulse:  [69-71] 71  Resp:  [18] 18  BP: (100-127)/(58-77) 113/75  SpO2:  [95 %-99 %] 95 %[JM1.2]    Weights:[JM1.1]   Vitals:    03/04/18 1029 03/04/18 1632   Weight: 95.3 kg (210 lb) 96.5 kg (212 lb 11.9 oz)    Body mass index is 37.69 kg/(m^2).[JM1.2]    GENERAL: Pleasant woman " who appears comfortable but tearful lying in bed.  EYES: Eyes grossly normal to inspection, extraocular movements intact  HENT: Nares patent bilaterally.  Nasal mucosa normal, no discharge.    NECK: Trachea midline, no stridor.    RESP: No accessory muscle use.  Symmetrical breath sounds.  Lungs clear anteriorly on inspiration and expiration.  Expiration not prolonged, no wheeze.  CV: Regular rate and rhythm, non-tachycardic.  Normal S1 S2, no murmur or extra sound.  No lower extremity edema.  ABDOMEN: Soft, reports tenderness to light palpation over all fields, but no guarding.  Liver and spleen not enlarged, no masses palpable.  Bowel sounds positive.  MS: No bony deformities noted.  No red or inflamed joints.  SKIN: Warm and dry, no rashes where skin visible.  NEURO: Alert, oriented, conversant.  Cranial nerves II - XII grossly intact.  No gross motor or sensory deficits.  Gait not assessed.  PSYCH: Calm, alert, conversant.  Able to articulate logical thoughts, no tangential thoughts, no hallucinations or delusions.  Affect normal; tearful when discussing her worries about her health and likelihood of recovery.[JM1.1]         Data     Recent Labs  Lab 03/05/18  0925 03/04/18  1130 03/03/18  0813 03/01/18  1450   WBC 8.4 11.2* 12.8*  --    HGB 11.4* 12.0 12.0  --    MCV 86 84 86  --     364 371  --    NA  --  139 142 136   POTASSIUM  --  3.8 3.4 4.3   CHLORIDE  --  106 109 107   CO2  --  28 26 21   BUN  --  10 13 10   CR  --  0.62 0.69 0.52   ANIONGAP  --  5 7 8   PATRICIA  --  8.3* 8.5 8.7   GLC  --  67* 118* 167*   ALBUMIN  --  3.3* 3.5 3.2*   PROTTOTAL  --  7.0 7.3 7.7   BILITOTAL  --  0.1* 0.1* 0.3   ALKPHOS  --  65 63 75   ALT  --  60* 32 43   AST  --  39 17 31         Recent Labs  Lab 03/04/18  1130 03/03/18  0813 03/01/18  1450   GLC 67* 118* 167*        Unresulted Labs Ordered in the Past 30 Days of this Admission     Date and Time Order Name Status Description    3/3/2018 0900 Ova and Parasite Exam  Routine In process[JM1.2]            Imaging[JM1.1]  No results found for this or any previous visit (from the past 24 hour(s)).[JM1.2]     I reviewed all new labs and imaging results over the last 24 hours. I personally reviewed no images or EKG's today.[JM1.1]    Medications     lactated ringers 125 mL/hr at 03/05/18 1118       acetaminophen  975 mg Oral Once     venlafaxine  150 mg Oral Daily with breakfast     metroNIDAZOLE  500 mg Intravenous Q8H[JM1.2]       Jeffry Yeh[JM1.1]            Revision History        User Key Date/Time User Provider Type Action    > JM1.2 3/5/2018  2:24 PM Jeffry Yeh MD Physician Sign     JM1.1 3/5/2018  1:50 PM Jefrfy Yeh MD Physician                   Procedure Notes     No notes of this type exist for this encounter.         Progress Notes - Therapies (Notes from 03/05/18 through 03/08/18)      Progress Notes by Cara Jenkins PT at 3/7/2018  5:02 PM     Author:  Cara Jenkins PT Service:  (none) Author Type:  Physical Therapist    Filed:  3/7/2018  5:04 PM Date of Service:  3/7/2018  5:02 PM Creation Time:  3/7/2018  5:02 PM    Status:  Signed :  Cara Jenkins PT (Physical Therapist)            03/07/18 1600   Quick Adds   Type of Visit Initial PT Evaluation   Living Environment   Lives With child(gold), dependent   Living Arrangements house   Home Accessibility stairs to enter home;stairs within home   Number of Stairs to Enter Home 3   Number of Stairs Within Home 7   Stair Railings at Home inside, present on left side   Functional Level Prior   Ambulation 0-->independent   Transferring 0-->independent   Toileting 0-->independent   Bathing 0-->independent   Dressing 0-->independent   Eating 0-->independent   Communication 0-->understands/communicates without difficulty   Swallowing 0-->swallows foods/liquids without difficulty   Cognition 0 - no cognition issues reported   Prior Functional Level Comment PLOF- Pt indep. with ambulation with no  device.    General Information   Onset of Illness/Injury or Date of Surgery - Date 03/06/18   Referring Physician Rao   Patient/Family Goals Statement Pt w/ goal  of decreasing her pain   Pertinent History of Current Problem (include personal factors and/or comorbidities that impact the POC) 43 year old female with PMH metastatic melanoma, depression/anxiety, TIA, migraines and thrombosis disorder who presented 3/4/18 with bloody stools x 2 weeks. admitted  with persistent bloody stools   General Observations Pt alert, pleasant- reports pain in bilateral LES - diffuse[CS1.1] non dermatomal[CS1.2]  Pain.[CS1.1]-[CS1.2] no  alleviating factors.   Pain moderate, severe at times   Pain Assessment   Patient Currently in Pain Yes, see Vital Sign flowsheet   Range of Motion (ROM)   ROM Comment WFL. No pain w/ ROM. Hamstring length to 45 degrees bi[CS1.1]laterally[CS1.2]  w/o SX   Strength   Strength Comments Good LE strength. No c/o weakness   Bed Mobility   Bed Mobility Comments indep.   Transfer Skills   Transfer Comments indep.   Gait   Gait Comments Indep.= pt amb. 120 feet[CS1.1] x2[CS1.2] w/ o no device,[CS1.1]st[CS1.2]nishant gait, good rate, able to navigate steps w/ unilate[CS1.1]r[CS1.2]al support with carryover pattern   Balance   Balance Comments good   Sensory Examination   Sensory Perception no deficits were identified   Clinical Impression   Criteria for Skilled Therapeutic Intervention evaluation only   Risk & Benefits of therapy have been explained Yes   Patient, Family & other staff in agreement with plan of care Yes   Clinical Impression Comments Evaluation only.  Pt scored 24[CS1.1]/24[CS1.2] on 6 clicks[CS1.1] B[CS1.2]asic mobility assessment, to continue amb. w/ nursing staff while hospitalized.- encouraged pt to amb as able, monitor Sx.   SX constant and do not increase w/ amb. HEP not issued as no deficits noted w/ strength, ROM.  Unclear etiology to leg pain-  Pt to discuss w/ MD Gan  "[place] [facility]  \"6 Clicks\" V.2 Basic Mobility Inpatient Short Form   1. Turning from your back to your side while in a flat bed without using bedrails? 4 - None   2. Moving from lying on your back to sitting on the side of a flat bed without using bedrails? 4 - None   3. Moving to and from a bed to a chair (including a wheelchair)? 4 - None   4. Standing up from a chair using your arms (e.g., wheelchair, or bedside chair)? 4 - None   5. To walk in hospital room? 4 - None   6. Climbing 3-5 steps with a railing? 4 - None   Basic Mobility Raw Score (Score out of 24.Lower scores equate to lower levels of function) 24   Total Evaluation Time   Total Evaluation Time (Minutes) 20[CS1.1]        Revision History        User Key Date/Time User Provider Type Action    > CS1.1 3/7/2018  5:04 PM Cara Jenkins, PT Physical Therapist Sign     CS1.2 3/7/2018  5:02 PM Cara Jenkins, PT Physical Therapist             "

## 2018-03-04 NOTE — H&P
[facility]    History and Physical  Hospital Medicine       Date of Admission:  3/4/2018  Date of Service: 3/4/2018     Assessment & Plan   Doretha Fernandez is a 43 year old female with PMH metastatic melanoma, depression/anxiety, TIA, migraines and thrombosis disorder who presents with bloody stools x 2 weeks.    Persistent bloody stools with mucus and diarrhea  2 weeks of worsening bloody mucus and diarrhea. Dull abdominal pain with intermittent sharp cramps, worst in LLQ. Nausea without emesis. Afebrile. WBC 11.2. No recent travel. CT abdomen/pelvis 3/4/2018 shows colonic wall thickening and mucosal enhancement involving rectum and distal sigmoid consistent with infectious coloprotitis vs inflammatory bowel disease. Treated with 1 dose of tinidazole for positive giardia 2/23/2018 (no other antibiotics recently); repeat giardia 2/28/2018 was negative. Fecal lactoferrin positive at that time. Prescribed steroid taper by Dr. Ghosh on 3/1/2018. ED spoke with GI who recommends stopping steroids, metronidazole x 2 weeks and flexible sigmoidoscopy with biopsiest.  DDx: Persistent giardia vs side effect from immunotherapy treatment vs Inflammatory bowel disease vs Other viral/bacterial infection (enteric bacteria/viral stool studies negative 3/3/2018).  - IVF with LR at 125 cc/hr  - stop PTA prednisone  - IV metronidazole 500 mg q 8 hours  - Zofran prn  - follow pending stool O&P, giardia, c.diff   - NPO, small sips ok  - plan ofr flexible sigmoidoscopy with biopsy, likely as outpatient   - pain management: tylenol prn, oxycodone prn, dilaudid prn    Headache with history of migraines  Occipital headache that wraps around to frontal. Similar to prior migraines. Consistent with tension headache. Patient followed with neurology (last seen 10/2017) for history of daily migraine, previously managed on topirimate, gabapentin, Wellbutrin, which did not help her symptoms. Has also been treated with  Benadryl, Reglan, Toradol and Decadron in the past, which patient states does not help her symptoms. Reports daily migraines stopped after diagnosis of her melanoma. Stopped all migraine medications and has not had headache until today.  - pain management as above    Metastatic malignant melanoma (H)  'Diagnosed on lymph node biopsy in 10/2017. Primary site not identified. Follows with Dr. Richards. Last seen 2/22/2018. On immunotherapy with Opdivo (nivolumab) since November every 2 weeks, which was recently held due to persistent diarrhea. Reports on treatment 18 of 26.     Mild major depression  Anxiety  Stable.  - continue home venlafaxine    Mild intermittent asthma  Well-controlled. Not on home medications.    Prothrombin Gene Mutation  History of stroke  Follows with Dr. Richards. History of stroke/TIA following both pregnancies. Hypercoagulability work up positive for prothrombin gene mutation. No history of DVT/PE.  - hold home ASA given bleeding    # Pain Assessment:   Current Pain Score 3/4/2018 3/4/2018   Patient currently in pain? - -   Pain score (0-10) 4 3   Pain location - -   Pain descriptors - -   - Doretha is experiencing pain due to headache and diarrhea. Pain management was discussed and the plan was created in a collaborative fashion.  Doretha's response to the current recommendations: engaged  - Please see the plan for pain management as documented above    FEN:  - LR at 125 cc/hr  -Will monitor electrolytes and replace as needed  - NPO except meds, ice chips, small sips    DVT Prophylaxis: Pneumatic Compression Devices  Code Status: Full Code    Disposition: Anticipate discharge in 2-3 days once patient is tolerating PO and work up is complete. Appropriate for inpatient level care    I have discussed patient and formulated plan with Dr. Gomez Lake PA-C  Hospital Medicine        Primary Care Physician   Anna Naidu [phone]    History is obtained from the patient, ED notes  and review of the EMR.    Past Medical History    Past Medical History:   Diagnosis Date     Abnormal MRI     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      Anxiety      Depression      Lumbago     left lower back pain     Malignant melanoma (H)      Mild persistent asthma      Prothrombin deficiency (H)     takes 81mg asa daily     Stroke (cerebrum) (H)     During      TIA (transient ischemic attack)      Patient Active Problem List    Diagnosis Date Noted     Colitis 2018     Priority: Medium     Functional diarrhea 2018     Priority: Medium     Sepsis due to cellulitis (H) 2017     Priority: Medium     Melanoma (H) 10/23/2017     Priority: Medium     Malignant melanoma of left upper extremity including shoulder (H) 10/12/2017     Priority: Medium     Metastatic malignant melanoma (H) 10/10/2017     Priority: Medium     Prothrombin mutation (H) 2017     Priority: Medium     On daily aspirin 81 mg per hematology's recommendations from        Vision changes 2017     Priority: Medium     Acute non intractable tension-type headache 2017     Priority: Medium     Mild intermittent asthma without complication 2013     Priority: Medium     Mild major depression (H) 2013     Priority: Medium     Anxiety state 2012     Priority: Medium     Problem list name updated by automated process. Provider to review       Esophageal reflux 2012     Priority: Medium     DUB (dysfunctional uterine bleeding) 2011     Priority: Medium     CARDIOVASCULAR SCREENING; LDL GOAL LESS THAN 160 2011     Priority: Low      Past Surgical History   Past Surgical History:   Procedure Laterality Date     COLONOSCOPY N/A 10/18/2017    Procedure: COLONOSCOPY;  Colon;  Surgeon: Debbie Stephens MD;  Location: [facility] OR     DISSECT LYMPH NODE AXILLA Left 10/23/2017    Procedure: DISSECT LYMPH NODE AXILLA;  Left Axillary Lymph Node Dissection ;  Surgeon:  "Laurent Cool MD;  Location: [facility] OR     GYN SURGERY           REPAIR MOHS Left 2017    Procedure: REPAIR MOHS;  Left Upper Lid Moh's Reconstruction;  Surgeon: Kisha Bosch MD;  Location:  OR      History of Present Illness   Doretha Fernandez is a 43 year old female who presents with worsening bloody stools over the past 2 weeks.    She states 2 weeks ago after her last infusion she started having diarrhea mixed with blood and pus. This has been ongoing and worsened 5 days ago and is now mostly blood with pus/mucus. She tested positive for giardia on 2018 and was treated with a dose of tinidazole. Repeat testing was negative. She did not notice much improvement in her symptoms. She is undergoing Opdivo infusions since November, which have been held due to concern that could be a side effect.     She reports that the department of public health called her and notified her that they believed her incubation period was around , she reports eating out often at that time. No recent travel. No other recent antibiotics.    Reports lower left quadrant abdominal pain which she describes as \"uncomortable\". She is also having worse intermittent left-sided sharp pains which last a few seconds. Worse with oral intake. She is hungry but has not been eating much because this immediately causes her to have a bowel movement. Reports nausea but no emesis.    Reports an occipital headache which wraps around to the frontal area. Feels \"dull\". Similar to her past headaches. Tried acetaminophen without much relief.     Reports chills and night sweats which have been intermittent since starting the infusions in November, no change from baseline.    Reports recent rash/redness in her face and chest due to prednisone which was started by her oncologist. Has been resolving since stopping prednisone.     Denies fever, myalgias, lightheadedness, dizziness, sore throat, rhinorrhea, congestion, cough, " "shortness of breath, chest pain, palpitations, dysuria, changes in urinary frequency/urgency or wounds.    Prior to Admission Medications   Prior to Admission Medications   Prescriptions Last Dose Informant Patient Reported? Taking?   Acetaminophen (TYLENOL PO) Past Week at Unknown time Self Yes Yes   Sig: Take 1,000 mg by mouth every 8 hours as needed for mild pain or fever   Cholecalciferol (VITAMIN D3) 3000 UNITS TABS 3/4/2018 at Unknown time Self No Yes   Sig: Take 3,000 Int'l Units by mouth daily   aspirin 81 MG EC tablet 3/4/2018 at Unknown time Self No Yes   Sig: Take 1 tablet (81 mg) by mouth daily   metroNIDAZOLE (FLAGYL) 500 MG tablet 3/4/2018 at am  No Yes   Sig: Take 1 tablet (500 mg) by mouth 2 times daily for 7 days   order for DME  Self No No   Sig: Equipment being ordered: 20-30mmHg compression sleeve and 20-30mmHg compression glove\" x2 pair   order for DME  Self No No   Sig: Equipment being ordered: x2 Wearease compression shirt   oxyCODONE IR (ROXICODONE) 5 MG tablet Past Month at Unknown time Self No Yes   Sig: Take 1-3 tablets (5-15 mg) by mouth every 4 hours as needed for pain maximum 12 tablet(s) per day   predniSONE (DELTASONE) 10 MG tablet 3/4/2018 at am Self No Yes   Sig: Take 60 mg by mouth daily x 4 days then decrease by 10 mg every 4 days until gone.   rOPINIRole (REQUIP) 0.25 MG tablet  Self No No   Sig: Take 1 tablet (0.25 mg) by mouth At Bedtime   venlafaxine (EFFEXOR-ER) 150 MG TB24 24 hr tablet 3/4/2018 at am Self No Yes   Sig: Take 1 tablet (150 mg) by mouth daily (with breakfast)   zonisamide (ZONEGRAN) 25 MG capsule  Self No No   Sig: Take 1 tablet (25 mg) once daily for 1 week, then 2 tablets once daily for 1 week, then 3 tablets once daily for 1 week, then 4 tablets once daily for 1 week.      Facility-Administered Medications: None     Allergies   Allergies   Allergen Reactions     Compazine [Prochlorperazine] Fatigue     Fentanyl Other (See Comments)     sweating     " Erythrocin Nausea and Vomiting     Zithromax [Azithromycin Dihydrate] Diarrhea       Family History    Family History   Problem Relation Age of Onset     CANCER Mother 45     lung     Neurologic Disorder Mother      Lipids Father      GASTROINTESTINAL DISEASE Father      Depression Father      CANCER Maternal Grandmother      Blood Disease Maternal Grandmother      Arthritis Maternal Grandmother      DIABETES Maternal Grandmother      Depression Maternal Grandmother      Macular Degeneration Maternal Grandmother      Glaucoma Maternal Grandmother      DIABETES Maternal Grandfather      CEREBROVASCULAR DISEASE Maternal Grandfather      Blood Disease Maternal Grandfather      HEART DISEASE Maternal Grandfather      Glaucoma Maternal Grandfather      CANCER Paternal Grandmother      Cancer - colorectal Paternal Grandmother      Respiratory Paternal Grandfather      Blood Disease Paternal Grandfather      HEART DISEASE Daughter      Asthma Daughter      Depression Sister        Social History   Social History     Social History     Marital status:      Spouse name: N/A     Number of children: N/A     Years of education: N/A     Occupational History     Not on file.     Social History Main Topics     Smoking status: Passive Smoke Exposure - Never Smoker     Last attempt to quit: 3/20/1998     Smokeless tobacco: Never Used     Alcohol use No      Comment: occ     Drug use: No     Sexual activity: Yes     Partners: Male     Birth control/ protection: Surgical     Other Topics Concern     Parent/Sibling W/ Cabg, Mi Or Angioplasty Before 65f 55m? No     Social History Narrative    20 y.o- patient's mother   of lung cancer. She had to take care of her younger sister.    2012- patient's  had a heart attack with stents placed, followed by cardiac rehabilitation    2000 TO 2012  was in [facility] for depression    2013 patient's  went  through alcohol rehabilitation at [facility]            They have attended couple counseling a couple of times and patient went to the family program for chemical dependency.    Patient denies alcohol or drug use and herself            Has 2 children, girls ages 10 and 13     For a while she was working 3 jobs since her  was ill. Works at the [employer] for [employer]. Reports her job is very stressful.              Review of Systems   The 10 point Review of Systems is negative other than noted in the HPI or here.     Physical Exam   /87  Pulse 78  Temp 98.1  F (36.7  C) (Oral)  Resp 14  Wt 95.3 kg (210 lb)  SpO2 95%  BMI 37.21 kg/m2     Weight: 210 lbs 0 oz Body mass index is 37.21 kg/(m^2).     Constitutional: Patient is lying down on exam, appears mildly uncomfortable, nontoxic, no acute distress, tearful at times. alert, oriented, cooperative, Appears stated age.  Eyes: Sclera are anicteric, EOMI, PEERLA  HENT: Normocephalic. Atraumatic. Oropharynx is clear and moist.   Cardiovascular: Regular rate and normal rhythm. No murmur, rubs or gallops noted. Radial pulses are 2+ bilaterally. Distal pulses are intact. No lower extremity edema.  Respiratory: No accessory muscle usage. Speaking in full sentences. Clear to auscultation bilaterally without wheezes, crackles or rhonchi.   GI: Normal bowel sounds present. soft, non-distended. Mild diffuse tenderness without rebound or guarding.  Genitourinary: Deferred  Musculoskeletal: Normal muscle bulk and tone. Moves all extremities appropriately.  Skin: Warm and dry. Slight erythema on upper chest. Non-tender. Not warm to touch.   Neurologic: Neck supple. Cranial nerves 3-12 are grossly intact.  is symmetric.     Data   Data reviewed today:     Recent Labs  Lab 03/04/18  1130 03/03/18  0813 03/01/18  1450   WBC 11.2* 12.8*  --    HGB 12.0 12.0  --    MCV 84 86  --     371  --     142 136   POTASSIUM 3.8 3.4 4.3    CHLORIDE 106 109 107   CO2 28 26 21   BUN 10 13 10   CR 0.62 0.69 0.52   ANIONGAP 5 7 8   PATRICIA 8.3* 8.5 8.7   GLC 67* 118* 167*   ALBUMIN 3.3* 3.5 3.2*   PROTTOTAL 7.0 7.3 7.7   BILITOTAL 0.1* 0.1* 0.3   ALKPHOS 65 63 75   ALT 60* 32 43   AST 39 17 31       Recent Results (from the past 24 hour(s))   CT Abdomen Pelvis w Contrast    Narrative    CT ABDOMEN AND PELVIS WITH CONTRAST 3/4/2018 12:16 PM     HISTORY: Bloody diarrhea/abdominal pain, history of melanoma, axilla  surgery in October 2017.       COMPARISON: PET/CT 10/11/2017.    TECHNIQUE: Axial images from the lung bases to the symphysis are  performed with additional coronal reformatted images. 100 mL of Isovue  370 are given intravenously.  Radiation dose for this scan was reduced  using automated exposure control, adjustment of the mA and/or kV  according to patient size, or iterative reconstruction technique.    FINDINGS:   The lung bases are clear.    Abdomen: Tiny cyst measuring less than 1 cm is present in the left  hepatic dome. This was present on a prior contrast-enhanced CT from  2012 and is therefore considered a benign cyst. The liver is otherwise  unremarkable. The spleen, gallbladder, fatty replaced pancreas,  adrenal glands, and kidneys are unremarkable. No hydronephrosis. No  enlarged lymph nodes. The bowel is normal in caliber without  obstruction or diverticulitis. Appendix is normal.    Pelvis: The bladder, uterus, and left ovary are unremarkable. Probable  dominant follicle right ovary is noted. No enlarged pelvic or inguinal  lymph nodes. There is colonic wall thickening and mucosal enhancement  involving the rectum and distal sigmoid colon with surrounding  perirectal adenopathy. In the surrounding mesorectal fat, small pelvic  sidewall lymph nodes are also noted. Findings could reflect an  infectious coloproctitis versus an inflammatory bowel disease such as  ulcerative colitis. Bone window examination is unremarkable.      Impression     IMPRESSION:  1. Probable infectious or inflammatory proctocolitis as described.  Surrounding mesorectal lymph nodes raise the possibility of a more  chronic inflammatory process such as ulcerative colitis. Underlying  mass should likely be excluded with follow-up colonoscopy if not  previously performed.  2. Simple cyst left hepatic lobe unchanged dating back to at least  2012. No further follow-up required. Abdominal and pelvic organs are  otherwise within normal limits.    ALEX RIVERA MD       I personally reviewed no images or EKG's today.    I have discussed patient and formulated plan with Dr. Gomez Mejia.    Chart documentation with keystrokes and/or Dragon voice recognition software. Although reviewed after completion, some word and grammatical error may remain.  Chantelle Lake [facility] Medicine

## 2018-03-04 NOTE — IP AVS SNAPSHOT
"          [facility]: [phone]                                              INTERAGENCY TRANSFER FORM - LAB / IMAGING / EKG / EMG RESULTS   3/4/2018                    Hospital Admission Date: 3/4/2018  ELIZABETH MOREIRA   : 1975  Sex: Female        Attending Provider: Mark Anthony German DO     Allergies:  Compazine [Prochlorperazine], Fentanyl, Erythrocin, Zithromax [Azithromycin Dihydrate]    Infection:  None   Service:  HOSPITALIST    Ht:  1.6 m (5' 3\")   Wt:  96.5 kg (212 lb 11.9 oz)   Admission Wt:  95.3 kg (210 lb)    BMI:  37.69 kg/m 2   BSA:  2.07 m 2            Patient PCP Information     Provider PCP Type    Anna Naidu MD General         Lab Results - 3 Days      Magnesium [[id number]]  Resulted: 18 1300, Result status: Final result    Ordering provider: Jeffry Yeh MD  18 1016 Resulting lab: [facility]    Specimen Information    Type Source Collected On   Blood  18 1229          Components       Value Reference Range Flag Lab   Magnesium 2.0 1.6 - 2.3 mg/dL  59            CK total [[id number]]  Resulted: 18 1300, Result status: Final result    Ordering provider: Jeffry Yeh MD  18 1016 Resulting lab: [facility]    Specimen Information    Type Source Collected On   Blood  18 1229          Components       Value Reference Range Flag Lab   CK Total 32 30 - 225 U/L  59            Basic metabolic panel [[id number]] (Abnormal)  Resulted: 18 1300, Result status: Final result    Ordering provider: Jeffry Yeh MD  18 1016 Resulting lab: [facility]    Specimen Information    Type Source Collected On   Blood  18 1229          Components       Value Reference Range Flag Lab   Sodium 138 133 - 144 mmol/L  59   Potassium 3.7 3.4 - 5.3 mmol/L  59   Chloride 106 94 - 109 mmol/L  59   Carbon Dioxide 30 20 - 32 mmol/L  59   Anion Gap 2 3 - 14 mmol/L L " 59   Glucose 147 70 - 99 mg/dL H 59   Urea Nitrogen 5 7 - 30 mg/dL L 59   Creatinine 0.64 0.52 - 1.04 mg/dL  59   GFR Estimate >90 >60 mL/min/1.7m2  59   Comment:  Non  GFR Calc   GFR Estimate If Black >90 >60 mL/min/1.7m2  59   Comment:  African American GFR Calc   Calcium 8.6 8.5 - 10.1 mg/dL  59            CBC with platelets [[id number]] (Abnormal)  Resulted: 03/07/18 1245, Result status: Final result    Ordering provider: Jeffry Yeh MD  03/07/18 1016 Resulting lab: [facility]    Specimen Information    Type Source Collected On   Blood  03/07/18 1229          Components       Value Reference Range Flag Lab   WBC 6.7 4.0 - 11.0 10e9/L  59   RBC Count 4.00 3.8 - 5.2 10e12/L  59   Hemoglobin 11.1 11.7 - 15.7 g/dL L 59   Hematocrit 34.5 35.0 - 47.0 % L 59   MCV 86 78 - 100 fl  59   MCH 27.8 26.5 - 33.0 pg  59   MCHC 32.2 31.5 - 36.5 g/dL  59   RDW 13.4 10.0 - 15.0 %  59   Platelet Count 321 150 - 450 10e9/L  59            Basic metabolic panel [[id number]] (Abnormal)  Resulted: 03/06/18 0650, Result status: Final result    Ordering provider: Jeffry Yeh MD  03/06/18 0001 Resulting lab: [facility]    Specimen Information    Type Source Collected On   Blood  03/06/18 0545          Components       Value Reference Range Flag Lab   Sodium 138 133 - 144 mmol/L  59   Potassium 3.6 3.4 - 5.3 mmol/L  59   Chloride 104 94 - 109 mmol/L  59   Carbon Dioxide 29 20 - 32 mmol/L  59   Anion Gap 5 3 - 14 mmol/L  59   Glucose 91 70 - 99 mg/dL  59   Urea Nitrogen 8 7 - 30 mg/dL  59   Creatinine 0.65 0.52 - 1.04 mg/dL  59   GFR Estimate >90 >60 mL/min/1.7m2  59   Comment:  Non  GFR Calc   GFR Estimate If Black >90 >60 mL/min/1.7m2  59   Comment:  African American GFR Calc   Calcium 7.9 8.5 - 10.1 mg/dL L 59            CBC with platelets [[id number]] (Abnormal)  Resulted: 03/06/18 0635, Result status: Final result    Ordering provider: Jeffry Yeh  MD Messi  03/06/18 0001 Resulting lab: [facility]    Specimen Information    Type Source Collected On   Blood  03/06/18 0545          Components       Value Reference Range Flag Lab   WBC 8.4 4.0 - 11.0 10e9/L  59   RBC Count 4.06 3.8 - 5.2 10e12/L  59   Hemoglobin 11.3 11.7 - 15.7 g/dL L 59   Hematocrit 34.8 35.0 - 47.0 % L 59   MCV 86 78 - 100 fl  59   MCH 27.8 26.5 - 33.0 pg  59   MCHC 32.5 31.5 - 36.5 g/dL  59   RDW 13.2 10.0 - 15.0 %  59   Platelet Count 336 150 - 450 10e9/L  59            Ova and Parasite Exam Routine [[id number]]  Resulted: 03/05/18 1506, Result status: Final result    Ordering provider: Clifford Soliz MD  03/03/18 0900 Resulting lab: [facility]    Specimen Information    Type Source Collected On   Feces  03/03/18 1105          Components       Value Reference Range Flag Lab   Specimen Description Feces      Ova and Parasite Exam Routine parasitology exam negative   225   Ova and Parasite Exam --   225   Result:         Many  PMNs seen     Ova and Parasite Exam --   225   Result:         Cryptosporidium, Cyclospora, and Microsporidia are not readily detected by this method. A   single negative specimen does not rule out parasitic infection.              Giardia antigen [[id number]] (Abnormal)  Resulted: 03/05/18 1348, Result status: Final result    Ordering provider: Clifford Soliz MD  03/03/18 1104 Resulting lab: [facility]    Specimen Information    Type Source Collected On   Feces  03/03/18 1105          Components       Value Reference Range Flag Lab   Specimen Description Feces      Giardia Antigen Test Positive for Giardia lamblia specific antigen by immunoassay.  A 75            CBC with platelets [[id number]] (Abnormal)  Resulted: 03/05/18 0940, Result status: Final result    Ordering provider: Jeffry Yeh MD  03/05/18 0928 Resulting lab: [facility]    Specimen Information     Type Source Collected On   Blood  03/05/18 0925          Components       Value Reference Range Flag Lab   WBC 8.4 4.0 - 11.0 10e9/L  59   RBC Count 4.15 3.8 - 5.2 10e12/L  59   Hemoglobin 11.4 11.7 - 15.7 g/dL L 59   Hematocrit 35.6 35.0 - 47.0 %  59   MCV 86 78 - 100 fl  59   MCH 27.5 26.5 - 33.0 pg  59   MCHC 32.0 31.5 - 36.5 g/dL  59   RDW 13.4 10.0 - 15.0 %  59   Platelet Count 329 150 - 450 10e9/L  59            CBC with platelets [[id number]]  Resulted: 03/05/18 0803, Result status: In process    Ordering provider: Chantelle Lake PA-C  03/05/18 0000 Resulting lab: [facility]    Specimen Information    Type Source Collected On   Blood  03/05/18 0801            Testing Performed By     Lab - Abbreviation Name Director Address Valid Date Range    45 - TNS744 MISYS Unknown Unknown 01/28/02 0000 - Present    59 - Unknown [facility] Unknown [address] [facility] [address] 12/31/14 1006 - Present    75 - Unknown [facility] Unknown [address] [facility] [address] 01/15/15 1019 - Present    225 - Unknown [facility] Unknown [address] [facility] [address] 12/19/14 0954 - Present            Unresulted Labs (24h ago through future)    Start       Ordered    Unscheduled  Blood culture  (Blood Culture - 2 Sites)  CONDITIONAL (SPECIFY),   Routine     Comments:  Site #1:  If temp is greater than 100.4    May repeat max of once per 24 hrs. (Stat Lab Collect with 1st site collected from Venipuncture and 2nd site from VAD by RN,  if no VAD then 2 peripheral sticks-2 venipuncture from different sites)    03/08/18 1552    Unscheduled  Blood culture  (Blood Culture - 2 Sites)  CONDITIONAL (SPECIFY),   Routine     Comments:  SITE #2: If temp is greater than 100.4    May repeat max of once per 24 hrs. (Stat Lab Collect with 1st site collected from Venipuncture and 2nd site from VAD by RN,  if no VAD then 2 peripheral sticks-2  venipuncture from different sites)    03/08/18 1552         Imaging Results - 3 Days      US leg bilateral venous [[id number]]  Resulted: 03/06/18 2040, Result status: Final result    Ordering provider: Ismael Romero MD  03/06/18 1956 Resulted by: Erich Guillen MD    Performed: 03/06/18 2000 - 03/06/18 2036 Resulting lab: RADIOLOGY RESULTS    Narrative:       VENOUS ULTRASOUND BOTH LEGS  3/6/2018 8:36 PM     HISTORY: rule out DVT;     COMPARISON: None.    FINDINGS: Examination of the deep veins with graded compression and  color flow Doppler with spectral wave form analysis shows  no evidence  of thrombus in the common femoral vein, femoral vein, popliteal vein  or calf veins.  There is no venous insufficiency.      Impression:       IMPRESSION: No DVT is identified in either lower extremity    JD GUILLEN MD      Testing Performed By     Lab - Abbreviation Name Director Address Valid Date Range    104 - Rad Rslts RADIOLOGY RESULTS Unknown Unknown 02/16/05 1553 - Present            Encounter-Level Documents:     There are no encounter-level documents.      Order-Level Documents:     There are no order-level documents.

## 2018-03-04 NOTE — IP AVS SNAPSHOT
"    [facility] SURGICAL: [phone]                                              INTERAGENCY TRANSFER FORM - PHYSICIAN ORDERS   3/4/2018                    Hospital Admission Date: 3/4/2018  ELIZABETH MOREIRA   : 1975  Sex: Female        Attending Provider: Mark Anthony German DO     Allergies:  Compazine [Prochlorperazine], Fentanyl, Erythrocin, Zithromax [Azithromycin Dihydrate]    Infection:  None   Service:  HOSPITALIST    Ht:  1.6 m (5' 3\")   Wt:  96.5 kg (212 lb 11.9 oz)   Admission Wt:  95.3 kg (210 lb)    BMI:  37.69 kg/m 2   BSA:  2.07 m 2            Patient PCP Information     Provider PCP Type    Anna Naidu MD General      ED Clinical Impression     Diagnosis Description Comment Added By Time Added    Hemorrhage of rectum and anus [K62.5] Hemorrhage of rectum and anus [K62.5]  Isaiah Reid MD 3/4/2018  2:52 PM      Hospital Problems as of 3/8/2018              Priority Class Noted POA    Mild major depression (H) Medium  2013 Yes    Metastatic malignant melanoma (H) Medium  10/10/2017 Yes    * (Principal)Rectal bleeding Medium  3/4/2018 Yes    Diarrhea Medium  3/4/2018 Yes    Intestinal giardiasis Medium  3/5/2018 Yes    Bilateral leg cramps Medium  3/7/2018 No      Non-Hospital Problems as of 3/8/2018              Priority Class Noted    DUB (dysfunctional uterine bleeding) Medium  2011    CARDIOVASCULAR SCREENING; LDL GOAL LESS THAN 160 Low  2011    Anxiety state Medium  2012    Esophageal reflux Medium  2012    Mild intermittent asthma without complication Medium  2013    Vision changes Medium  2017    Acute non intractable tension-type headache Medium  2017    Prothrombin mutation (H) Medium  2017    Malignant melanoma of left upper extremity including shoulder (H) Medium  10/12/2017    Melanoma (H) Medium  10/23/2017    Sepsis due to cellulitis (H) Medium  2017    Functional diarrhea Medium  2018    Colitis " Medium  3/1/2018      Code Status History     Date Active Date Inactive Code Status Order ID Comments User Context    3/8/2018  3:52 PM 3/8/2018  3:55 PM Full Code [id number]  Cyndie Epstein APRN CNP Inpatient    3/8/2018 12:08 PM 3/8/2018  3:52 PM Full Code [id number]  Jeffry Yeh MD Outpatient    3/4/2018  6:29 PM 3/8/2018 12:08 PM Full Code [id number]  Chantelle Lake PA-C Inpatient    11/14/2017 10:16 PM 11/18/2017 12:20 PM Full Code [id number]  Lowell Delatorre MD Inpatient    10/23/2017  3:56 PM 10/24/2017  5:18 PM Full Code [id number]  Annamarie Klein MD Inpatient    4/1/2017  7:10 PM 4/5/2017  2:59 PM Full Code [id number]  Marcela Treviño MD Inpatient         Medication Review      START taking        Dose / Directions Comments    metroNIDAZOLE 5 mg/mL infusion   Commonly known as:  FLAGYL   Indication:  diarrhea, recent history of giardia   Used for:  Diarrhea, unspecified type        Dose:  500 mg   Inject 100 mLs (500 mg) into the vein every 8 hours   Quantity:  5600 mL   Refills:  0          CONTINUE these medications which may have CHANGED, or have new prescriptions. If we are uncertain of the size of tablets/capsules you have at home, strength may be listed as something that might have changed.        Dose / Directions Comments    oxyCODONE IR 5 MG tablet   Commonly known as:  ROXICODONE   This may have changed:    - how much to take  - when to take this  - reasons to take this  - additional instructions   Used for:  Abdominal pain, unspecified abdominal location        Dose:  5-10 mg   Take 1-2 tablets (5-10 mg) by mouth every 3 hours as needed for other (pain control or improvement in physical function. Hold dose for analgesic side effects.)   Quantity:  18 tablet   Refills:  0          CONTINUE these medications which have NOT CHANGED        Dose / Directions Comments    * order for DME   Used for:  Lymphedema of left arm        Equipment being ordered: 20-30mmHg compression  "sleeve and 20-30mmHg compression glove\" x2 pair   Quantity:  1 Units   Refills:  0        * order for DME   Used for:  Secondary lymphedema        Equipment being ordered: x2 Wearease compression shirt   Quantity:  1 each   Refills:  0        rOPINIRole 0.25 MG tablet   Commonly known as:  REQUIP   Used for:  Restless leg syndrome        Dose:  0.25 mg   Take 1 tablet (0.25 mg) by mouth At Bedtime   Quantity:  30 tablet   Refills:  11    File until needed       TYLENOL PO        Dose:  1000 mg   Take 1,000 mg by mouth every 8 hours as needed for mild pain or fever   Refills:  0        venlafaxine 150 MG Tb24 24 hr tablet   Commonly known as:  EFFEXOR-ER   Used for:  Mild major depression (H), Diarrhea        Dose:  150 mg   Take 1 tablet (150 mg) by mouth daily (with breakfast)   Quantity:  30 each   Refills:  0        Vitamin D3 3000 UNITS Tabs   Used for:  Vitamin D deficiency        Dose:  3000 Int'l Units   Take 3,000 Int'l Units by mouth daily   Quantity:  30 tablet   Refills:  3        * Notice:  This list has 2 medication(s) that are the same as other medications prescribed for you. Read the directions carefully, and ask your doctor or other care provider to review them with you.      STOP taking     aspirin 81 MG EC tablet           metroNIDAZOLE 500 MG tablet   Commonly known as:  FLAGYL           predniSONE 10 MG tablet   Commonly known as:  DELTASONE           zonisamide 25 MG capsule   Commonly known as:  Zonegran                   Summary of Visit     Reason for your hospital stay       You have been hospitalized for evaluation and treatment of abdominal pain and bloody diarrhea, which we're not yet able to fully explain.  You're being transferred to the [facility] for further evaluation and treatment, under the care of the Oncology team, as well as any specialists Oncology may wish to consult.             After Care     Activity - Up ad miriam           Advance Diet as Tolerated       Follow " this diet upon discharge: Orders Placed This Encounter      Advance Diet as Tolerated: Regular Diet Adult             Your next 10 appointments already scheduled     Mar 15, 2018  8:30 AM CDT   LAB with [facility] ([facility])    [address] [facility] [address]   [phone]           Please do not eat 10-12 hours before your appointment if you are coming in fasting for labs on lipids, cholesterol, or glucose (sugar). This does not apply to pregnant women. Water, hot tea and black coffee (with nothing added) are okay. Do not drink other fluids, diet soda or chew gum.            Mar 15, 2018  9:00 AM CDT   Return Visit with Kathy Richards MD   [facility] ([facility])    [facility]  [address]  [facility] [address]   [phone]            Mar 15, 2018  9:30 AM CDT   Level 2 with ROOM 1 [facility] ([facility])    [facility]  [address]  [facility] [address]   [phone]            Mar 29, 2018  8:30 AM CDT   Level 2 with ROOM 3 [facility] ([facility])    [facility]  [address]  [facility] [address]   [phone]            Apr 09, 2018  2:30 PM CDT   [facility] Visit with Lowell Bullock MD   [facility]    [address] [facility] [address]   [phone]              Statement of Approval     Ordered          03/08/18 1208  I have reviewed and agree with all the recommendations and orders detailed in this document.  EFFECTIVE NOW     Approved and electronically signed by:  Jeffry Yeh MD

## 2018-03-04 NOTE — IP AVS SNAPSHOT
` `     [facility] SURGICAL: [phone]            Medication Administration Report for Doretha Fernandez as of 03/08/18 1651   Legend:    Given Hold Not Given Due Canceled Entry Other Actions    Time Time (Time) Time  Time-Action       Inactive    Active    Linked        Medications 03/02/18 03/03/18 03/04/18 03/05/18 03/06/18 03/07/18 03/08/18    acetaminophen (TYLENOL) tablet 650 mg  Dose: 650 mg  Freq: EVERY 4 HOURS PRN Route: PO  PRN Reason: mild pain  Start: 03/04/18 1639   Admin Instructions: Alternate ibuprofen (if ordered) with acetaminophen.  Maximum acetaminophen dose from all sources = 75 mg/kg/day not to exceed 4 grams/day.       2343 (650 mg)-Given          0826 (650 mg)-Given       1445 (650 mg)-Given       2104 (650 mg)-Given        0453 (650 mg)-Given       1232 (650 mg)-Given           HYDROmorphone (PF) (DILAUDID) injection 0.5-1 mg  Dose: 0.5-1 mg  Freq: EVERY 3 HOURS PRN Route: IV  PRN Reason: severe pain  Start: 03/08/18 1600   Admin Instructions: For ordered doses up to 4 mg give IV Push undiluted. Administer each 2mg over 2-5 minutes.                 Dose: 0.5-1 mg  Freq: EVERY 2 HOURS PRN Route: IV  PRN Reason: other  PRN Comment: for pain control or improvement in physical function.  Hold dose for analgesic side effects.  Start: 03/05/18 1718   End: 03/08/18 1555   Admin Instructions: Start at the lowest dose.  May adjust dose by 0.1 mg every 2 hours as needed.  Notify provider to assess for uncontrolled pain or analgesic side effects. Hold while on PCA or with regular IV opioid dosing  For ordered doses up to 4 mg give IV Push undiluted. Administer each 2mg over 2-5 minutes.        1957 (0.5 mg)-Given       2350 (1 mg)-Given        0326 (1 mg)-Given       0556 (1 mg)-Given       1048 (1 mg)-Given       1916 (0.5 mg)-Given       2252 (1 mg)-Given        0239 (1 mg)-Given       0450 (1 mg)-Given       1320 (1 mg)-Given       1801 (1 mg)-Given       2244 (1 mg)-Given         1555-Med Discontinued       lactated ringers infusion  Rate: 125 mL/hr   Freq: CONTINUOUS Route: IV  Start: 03/04/18 1645      1653 ( )-New Bag        0133 ( )-New Bag       1118 ( )-New Bag        0553 ( )-New Bag       1600 ( )-Rate/Dose Verify       1700 ( )-Rate/Dose Verify       1800 ( )-Rate/Dose Verify        0345 ( )-New Bag       1316 ( )-New Bag        0007 ( )-New Bag           LORazepam (ATIVAN) tablet 1-2 mg  Dose: 1-2 mg  Freq: AT BEDTIME PRN Route: PO  PRN Reason: anxiety  Start: 03/08/18 0021          0036 (1 mg)-Given       0457 (1 mg)-Given           melatonin tablet 1 mg  Dose: 1 mg  Freq: AT BEDTIME PRN Route: PO  PRN Reason: sleep  Start: 03/04/18 1639   Admin Instructions: Do not give unless at least 6 hours of uninterrupted sleep is expected.               metroNIDAZOLE (FLAGYL) infusion 500 mg  Dose: 500 mg  Freq: EVERY 8 HOURS Route: IV  Indications Comment: diarrhea, recent history of giardia  Last Dose: 500 mg (03/07/18 0602)  Start: 03/04/18 2200      2135 (500 mg)-New Bag        0529 (500 mg)-New Bag       1329 (500 mg)-New Bag       2213 (500 mg)-New Bag        0556 (500 mg)-New Bag       1412 (500 mg)-New Bag       2253 (500 mg)-New Bag        0602 (500 mg)-New Bag       1417 (500 mg)-New Bag       2132 (500 mg)-New Bag        0709 (500 mg)-New Bag       1408 (500 mg)-New Bag       [ ] 2200           naloxone (NARCAN) injection 0.1-0.4 mg  Dose: 0.1-0.4 mg  Freq: EVERY 2 MIN PRN Route: IV  PRN Reason: opioid reversal  Start: 03/04/18 1639   Admin Instructions: For respiratory rate LESS than or EQUAL to 8.  Partial reversal dose:  0.1 mg titrated q 2 minutes for Analgesia Side Effects Monitoring Sedation Level of 3 (frequently drowsy, arousable, drifts to sleep during conversation).Full reversal dose:  0.4 mg bolus for Analgesia Side Effects Monitoring Sedation Level of 4 (somnolent, minimal or no response to stimulation).  For ordered doses up to 2mg give IVP. Give each 0.4mg over 15  seconds in emergency situations. For non-emergent situations further dilute in 9mL of NS to facilitate titration of response.               naloxone (NARCAN) injection 0.1-0.4 mg  Dose: 0.1-0.4 mg  Freq: EVERY 2 MIN PRN Route: IV  PRN Reason: opioid reversal  Start: 03/04/18 1639   Admin Instructions: For respiratory rate LESS than or EQUAL to 8.  Partial reversal dose:  0.1 mg titrated q 2 minutes for Analgesia Side Effects Monitoring Sedation Level of 3 (frequently drowsy, arousable, drifts to sleep during conversation).Full reversal dose:  0.4 mg bolus for Analgesia Side Effects Monitoring Sedation Level of 4 (somnolent, minimal or no response to stimulation).  For ordered doses up to 2mg give IVP. Give each 0.4mg over 15 seconds in emergency situations. For non-emergent situations further dilute in 9mL of NS to facilitate titration of response.               ondansetron (ZOFRAN) 8 mg in sodium chloride 0.9 % intermittent infusion  Dose: 8 mg  Freq: EVERY 8 HOURS PRN Route: IV  PRN Comment: nausea or vomitting  Start: 03/08/18 1627   Admin Instructions: Irritant.                 Dose: 8 mg  Freq: EVERY 8 HOURS PRN Route: IV  PRN Reasons: nausea,vomiting  Start: 03/08/18 1551   End: 03/08/18 1630   Admin Instructions: Offer second  Irritant. For ordered doses up to 4 mg, give IV Push undiluted over 2-5 minutes.           1630-Med Discontinued      Or  ondansetron (ZOFRAN-ODT) ODT tab 8 mg  Dose: 8 mg  Freq: EVERY 8 HOURS PRN Route: PO  PRN Reasons: nausea,vomiting  Start: 03/08/18 1551   Admin Instructions: Offer second.  With dry hands, peel back foil backing and gently remove tablet; do not push oral disintegrating tablet through foil backing; administer immediately on tongue and oral disintegrating tablet dissolves in seconds; then swallow with saliva; liquid not required.              Or    Dose: 8 mg  Freq: EVERY 8 HOURS PRN Route: PO  PRN Reasons: nausea,vomiting  Start: 03/08/18 1551   End: 03/08/18 1616    Admin Instructions: Offer second.           1616-Med Discontinued         Dose: 4 mg  Freq: EVERY 6 HOURS PRN Route: PO  PRN Reasons: nausea,vomiting  Start: 03/04/18 1639   End: 03/08/18 1556   Admin Instructions: This is Step 1 of nausea and vomiting management.  If nausea not resolved in 15 minutes, go to Step 2 prochlorperazine (COMPAZINE). Do not push through foil backing. Peel back foil and gently remove. Place on tongue immediately. Administration with liquid unnecessary  With dry hands, peel back foil backing and gently remove tablet; do not push oral disintegrating tablet through foil backing; administer immediately on tongue and oral disintegrating tablet dissolves in seconds; then swallow with saliva; liquid not required.                              1013 (4 mg)-Given       1808 (4 mg)-Given         1556-Med Discontinued      Or    Dose: 4 mg  Freq: EVERY 6 HOURS PRN Route: IV  PRN Reasons: nausea,vomiting  Start: 03/04/18 1639   End: 03/08/18 1556   Admin Instructions: This is Step 1 of nausea and vomiting management.  If nausea not resolved in 15 minutes, go to Step 2 prochlorperazine (COMPAZINE).  Irritant. For ordered doses up to 4 mg, give IV Push undiluted over 2-5 minutes.       2140 (4 mg)-Given        0527 (4 mg)-Given       2349 (4 mg)-Given                        1556-Med Discontinued       oxyCODONE IR (ROXICODONE) tablet 5-10 mg  Dose: 5-10 mg  Freq: EVERY 3 HOURS PRN Route: PO  PRN Reason: moderate to severe pain  Start: 03/08/18 1648              oxyCODONE IR (ROXICODONE) tablet 5-10 mg  Dose: 5-10 mg  Freq: EVERY 3 HOURS PRN Route: PO  PRN Reason: moderate to severe pain  Start: 03/08/18 1553                Dose: 5-10 mg  Freq: EVERY 3 HOURS PRN Route: PO  PRN Reason: other  PRN Comment: pain control or improvement in physical function. Hold dose for analgesic side effects.  Start: 03/04/18 1639   End: 03/08/18 1555   Admin Instructions: Start with the lowest dose.  May adjust dose by 5  mg every 3 hours as needed. Notify provider to assess for uncontrolled pain or analgesic side effects. Hold while on PCA or with regular IV opioid dosing.       2135 (10 mg)-Given        1118 (10 mg)-Given        0841 (10 mg)-Given       1239 (10 mg)-Given       1554 (10 mg)-Given        0826 (10 mg)-Given       1136 (10 mg)-Given       1445 (10 mg)-Given       2103 (10 mg)-Given       2342 (10 mg)-Given        0453 (10 mg)-Given       1231 (10 mg)-Given       1555-Med Discontinued       prochlorperazine (COMPAZINE) tablet 5-10 mg  Dose: 5-10 mg  Freq: EVERY 6 HOURS PRN Route: PO  PRN Reasons: nausea,vomiting  Start: 03/08/18 1551   Admin Instructions: Offer first              Or  prochlorperazine (COMPAZINE) injection 5-10 mg  Dose: 5-10 mg  Freq: EVERY 6 HOURS PRN Route: IV  PRN Reasons: nausea,vomiting  Start: 03/08/18 1551   Admin Instructions: Offer first  For ordered doses up to 10 mg, give IV Push undiluted. Each 5mg over 1 minute.               rOPINIRole (REQUIP) tablet 0.25 mg  Dose: 0.25 mg  Freq: AT BEDTIME Route: PO  Start: 03/07/18 2300         2342 (0.25 mg)-Given        [ ] 2200             Rate: 75 mL/hr   Freq: CONTINUOUS Route: IV  Start: 03/08/18 1600   End: 03/08/18 1555          1555-Med Discontinued       venlafaxine (EFFEXOR-ER) 24 hr tablet 150 mg  Dose: 150 mg  Freq: DAILY WITH BREAKFAST Route: PO  Start: 03/05/18 0800   Admin Instructions: DO NOT CRUSH.        0801 (150 mg)-Given        0835 (150 mg)-Given        0827 (150 mg)-Given        1135 (150 mg)-Given          Completed Medications  Medications 03/02/18 03/03/18 03/04/18 03/05/18 03/06/18 03/07/18 03/08/18         Dose: 0.5 mg  Freq: ONCE PRN Route: PO  PRN Reason: anxiety  Start: 03/06/18 2048   End: 03/06/18 2129        2129 (0.5 mg)-Given            Discontinued Medications  Medications 03/02/18 03/03/18 03/04/18 03/05/18 03/06/18 03/07/18 03/08/18         Dose: 0.3-0.5 mg  Freq: EVERY 2 HOURS PRN Route: IV  PRN Reason:  other  PRN Comment: for pain control or improvement in physical function.  Hold dose for analgesic side effects.  Start: 03/04/18 1639   End: 03/05/18 1719   Admin Instructions: Start at the lowest dose.  May adjust dose by 0.1 mg every 2 hours as needed.  Notify provider to assess for uncontrolled pain or analgesic side effects. Hold while on PCA or with regular IV opioid dosing  For ordered doses up to 4 mg give IV Push undiluted. Administer each 2mg over 2-5 minutes.       1653 (0.5 mg)-Given       1946 (0.5 mg)-Given        0133 (0.5 mg)-Given       0527 (0.5 mg)-Given       0846 (0.5 mg)-Given       1433 (0.5 mg)-Given       1632 (0.5 mg)-Given       1719-Med Discontinued

## 2018-03-04 NOTE — PLAN OF CARE
"Problem: Patient Care Overview  Goal: Plan of Care/Patient Progress Review  Outcome: No Change  [facility] ADMISSION NOTE    Patient admitted to room 2400 at approximately 1620 via wheel chair from emergency room. Patient was accompanied by transport tech.     Verbal SBAR report received from Dori prior to patient arrival.     Patient trasferred to bed via self. Patient alert and oriented X 3. Pain is controlled with current analgesics.  Medication(s) being used: narcotic analgesics including dilaudid. Admission vital signs: Blood pressure 127/77, pulse 70, temperature 98.4  F (36.9  C), temperature source Oral, resp. rate 18, height 1.6 m (5' 3\"), weight 96.5 kg (212 lb 11.9 oz), SpO2 99 %, not currently breastfeeding. Patient was oriented to room, call light, plan of care, visiting hours.     The following safety risks were identified during admission: none. Yellow risk band applied: SRIRAM Leblanc        "

## 2018-03-04 NOTE — IP AVS SNAPSHOT
` ` Patient Information     Patient Name Sex Doretha Urrutia ([id number]) Female 1975       Room Bed    2400 [facility] [place]0-      Patient Demographics     Address Phone E-mail Address    [address] [place] [address] [phone] (Home)  [phone] (Mobile) *Preferred* [email or web address]      Patient Ethnicity & Race     Ethnic Group Patient Race    American White      Emergency Contact(s)     Name Relation Home Work Mobile    JUDY GRANT parent [phone] none none    YuСергей villar Father none none none      Documents on File        Status Date Received Description       Documents for the Patient    Face Sheet  () 08     Consent Form  08     Privacy Notice - [place] Received 07     Insurance Card Received () 08 HP    Face Sheet  () 10/10/08     Consent Form  10/10/08     Face Sheet Received () 11/15/09     External Medication Information Consent Accepted () 11     Patient ID Received () 16 [place] EXP 2017    Consent for Services - Hospital/Clinic Received () 11     Waiver - Payment Received 11/19/10     HIM HARLEY Authorization - File Only   [facility] Authorization    CMS IM for Patient Signature       Consent for Services - Hospital/Clinic Received () 12     External Medication Information Consent Accepted 12     HIM HARLEY Authorization - File Only  12 [facility]    Consent for EHR Access  13 Copied from existing Consent for services - C/HOD collected on 2011    [facility] Specified Other       Insurance Card Received () 13 bcbs    Consent for Services - Hospital/Clinic Received () 13     Consent for Services - Hospital/Clinic Received () 14     Consent for Services - Hospital/Clinic Received () 04/16/15     Consent for Services/Privacy Notice - Hospital/Clinic Received () 16     Insurance Card Received ()  16 no card    Insurance Card Received () 16 hp    Insurance Card Received () 16 no card    Business/Insurance/Care Coordination/Health Form - Patient  16 [employer] 2016    Insurance Card Received () 17 no card    Consent for Services/Privacy Notice - Hospital/Clinic Received 17     Insurance Card Received 18 HP    Consent to Communicate Received 17     Consent for Services - [facility] Received 17     Consent for Services - [facility] Received 17 GENERAL CONSENT FORM    HIM HARLEY Authorization  17     HIM HARLEY Authorization - File Only  10/16/17 [facility] - 10/12/17    HIM HARLEY Authorization - File Only  10/16/17 [facility] - 10/12/2017    Care Everywhere Prospective Auth Received 10/20/17     HIM HARLEY Authorization - File Only  10/19/17 [facility] 10/12/17    Business/Insurance/Care Coordination/Health Form - Patient   HP OPDIVO PA ASHANTI 17-18    HIM HARLEY Authorization  17 [employer]    Business/Insurance/Care Coordination/Health Form - Patient  18 REPORT OF WORK ABILITY/RETURN TO WORK - [facility] - 18    Consent to Communicate Received 18 Clementine-sister,Сергей-father,Joanna-friend    Consent to Communicate  18 AUTHORIZATION TO VERBALLY DISCUSS PROTECTED HEALTH INFORMATION 18    Insurance Card  (Deleted)         Documents for the Encounter    CMS IM for Patient Signature         Admission Information     Attending Provider Admitting Provider Admission Type Admission Date/Time    Mark Anthony German DO Patel, Manish Ramesh, DO Emergency 18  1026    Discharge Date Hospital Service Auth/Cert Status Service Area     Hospitalist [facility] SERVICES    Unit Room/Bed Admission Status       [facility] 2400/2400-01 Admission (Confirmed)       Admission     Complaint    Rectal bleeding, Rectal bleeding      Hospital Account     Name Acct  ID Class Status Primary Coverage    Doretha Fernandez [id number] Inpatient Open [employer] - [place] OPEN ACCESS FULLY INSURED            Guarantor Account (for Hospital Account #[id number])     Name Relation to Pt Service Area Active? Acct Type    Doretha Fernandez  FCS Yes Personal/Family    Address Phone          [address] [place] [address] [phone](H)  [phone](O)              Coverage Information (for Hospital Account #[id number])     F/O Payor/Plan Precert #    [employer]/HP OPEN ACCESS FULLY INSURED     Subscriber Subscriber #    Doretha Fernandez [id number]    Address Phone    [address]  [place] [address] [phone]

## 2018-03-04 NOTE — ED NOTES
"Pt returns following ED visit yesterday, doesn't \"feel improved. I basically have slept since I left here, and I just am not better. Now its just bloody diarrhea, and I almost feel constipated like\" Taking tylenol for pain.   "

## 2018-03-05 PROBLEM — A07.1 INTESTINAL GIARDIASIS: Status: ACTIVE | Noted: 2018-03-05

## 2018-03-05 LAB
ERYTHROCYTE [DISTWIDTH] IN BLOOD BY AUTOMATED COUNT: 13.4 % (ref 10–15)
G LAMBLIA AG STL QL IA: ABNORMAL
HCT VFR BLD AUTO: 35.6 % (ref 35–47)
HGB BLD-MCNC: 11.4 G/DL (ref 11.7–15.7)
MCH RBC QN AUTO: 27.5 PG (ref 26.5–33)
MCHC RBC AUTO-ENTMCNC: 32 G/DL (ref 31.5–36.5)
MCV RBC AUTO: 86 FL (ref 78–100)
O+P STL MICRO: NORMAL
PLATELET # BLD AUTO: 329 10E9/L (ref 150–450)
RBC # BLD AUTO: 4.15 10E12/L (ref 3.8–5.2)
SPECIMEN SOURCE: ABNORMAL
SPECIMEN SOURCE: NORMAL
WBC # BLD AUTO: 8.4 10E9/L (ref 4–11)

## 2018-03-05 PROCEDURE — 12000000 ZZH R&B MED SURG/OB

## 2018-03-05 PROCEDURE — 25000132 ZZH RX MED GY IP 250 OP 250 PS 637: Performed by: FAMILY MEDICINE

## 2018-03-05 PROCEDURE — 85027 COMPLETE CBC AUTOMATED: CPT

## 2018-03-05 PROCEDURE — 25000128 H RX IP 250 OP 636: Performed by: PHYSICIAN ASSISTANT

## 2018-03-05 PROCEDURE — 25000125 ZZHC RX 250: Performed by: PHYSICIAN ASSISTANT

## 2018-03-05 PROCEDURE — 25000128 H RX IP 250 OP 636: Performed by: FAMILY MEDICINE

## 2018-03-05 PROCEDURE — 25000132 ZZH RX MED GY IP 250 OP 250 PS 637: Performed by: PHYSICIAN ASSISTANT

## 2018-03-05 PROCEDURE — 99233 SBSQ HOSP IP/OBS HIGH 50: CPT

## 2018-03-05 RX ORDER — HYDROMORPHONE HYDROCHLORIDE 1 MG/ML
.5-1 INJECTION, SOLUTION INTRAMUSCULAR; INTRAVENOUS; SUBCUTANEOUS
Status: DISCONTINUED | OUTPATIENT
Start: 2018-03-05 | End: 2018-03-08

## 2018-03-05 RX ADMIN — Medication 0.5 MG: at 16:32

## 2018-03-05 RX ADMIN — ONDANSETRON 4 MG: 2 INJECTION INTRAMUSCULAR; INTRAVENOUS at 23:49

## 2018-03-05 RX ADMIN — METRONIDAZOLE 500 MG: 500 INJECTION, SOLUTION INTRAVENOUS at 05:29

## 2018-03-05 RX ADMIN — Medication 0.5 MG: at 19:57

## 2018-03-05 RX ADMIN — Medication 0.5 MG: at 08:46

## 2018-03-05 RX ADMIN — SODIUM CHLORIDE, POTASSIUM CHLORIDE, SODIUM LACTATE AND CALCIUM CHLORIDE: 600; 310; 30; 20 INJECTION, SOLUTION INTRAVENOUS at 11:18

## 2018-03-05 RX ADMIN — SODIUM CHLORIDE, POTASSIUM CHLORIDE, SODIUM LACTATE AND CALCIUM CHLORIDE: 600; 310; 30; 20 INJECTION, SOLUTION INTRAVENOUS at 01:33

## 2018-03-05 RX ADMIN — ONDANSETRON 4 MG: 2 INJECTION INTRAMUSCULAR; INTRAVENOUS at 05:27

## 2018-03-05 RX ADMIN — Medication 0.5 MG: at 05:27

## 2018-03-05 RX ADMIN — ACETAMINOPHEN 975 MG: 325 TABLET, FILM COATED ORAL at 16:32

## 2018-03-05 RX ADMIN — Medication 0.5 MG: at 14:33

## 2018-03-05 RX ADMIN — Medication 0.5 MG: at 01:33

## 2018-03-05 RX ADMIN — Medication 1 MG: at 23:50

## 2018-03-05 RX ADMIN — METRONIDAZOLE 500 MG: 500 INJECTION, SOLUTION INTRAVENOUS at 13:29

## 2018-03-05 RX ADMIN — METRONIDAZOLE 500 MG: 500 INJECTION, SOLUTION INTRAVENOUS at 22:13

## 2018-03-05 RX ADMIN — OXYCODONE HYDROCHLORIDE 10 MG: 5 TABLET ORAL at 11:18

## 2018-03-05 RX ADMIN — VENLAFAXINE HYDROCHLORIDE 150 MG: 150 TABLET, EXTENDED RELEASE ORAL at 08:01

## 2018-03-05 NOTE — CONSULTS
Care Transition Initial Assessment - RN  Reason For Consult: discharge planning   Met with: Patient.    DATA   Principal Problem:    Rectal bleeding  Active Problems:    Mild major depression (H)    Metastatic malignant melanoma (H)    Diarrhea       [facility]  Primary Care MD Name: Anna Naidu  Contact information and PCP information verified: Yes    ASSESSMENT  Cognitive Status: awake, alert and oriented.             Lives With: child(gold), dependent  Living Arrangements: house     Description of Support System: Supportive, Involved   Who is your support system?: Parent(s)   Support Assessment: Adequate family and caregiver support   Insurance Concerns: No Insurance issues identified      This writer met with pt, introduced self and role. Discussed discharge planning and Medicare guidelines in regards to home care and SNF benefits. Patient lives independently with her two daughters ages 10 and 13 and works at the [employer].     PLAN    Patient will return home on discharge      Discharge Planner   Discharge Plans in progress: Home  Barriers to discharge plan: medical stability  Follow up plan: Handoff to [facility]       Entered by: VICTOR MANUEL PINEDA 03/05/2018 1:00 PM         Emilia Lopez, MSN, RN, Care Coordinator  [facility] [phone]  [facility] [phone]

## 2018-03-05 NOTE — PLAN OF CARE
"Problem: Patient Care Overview  Goal: Plan of Care/Patient Progress Review  Outcome: No Change  Pt has intermittent abdominal pain and nausea, pt stated she doesn't feel that she is \"any better\" \"still feels the same\". Given PRN 0.5 mg IV Dilaudid x2 and PRN 4 mg IV Zofran x1 this shift. Pt using aromatherapy (peppermint scent) to help relieve nausea. Vital signs stable, afebrile, O2 sat 97% on room air. IV fluids running at 125 ml/hr, pt received schedule IV Flagyl this shift.   Pt has been tolerating ice chips. She stated to writer that she has had x1 episode of the bloody diarrhea. Denies dizziness or lightheadness or SOB. Pt is independent in her room, able to make her needs be known.       "

## 2018-03-05 NOTE — PROGRESS NOTES
[facility] Medicine Progress Note  Date of Service: 03/05/2018    Assessment & Plan   Doretha Fernandez is a 43 year old female with PMH metastatic melanoma, depression/anxiety, TIA, migraines and thrombosis disorder who presents with bloody stools x 2 weeks.    Principal Problem:    Rectal bleeding  Active Problems:    Mild major depression (H)    Metastatic malignant melanoma (H)    Diarrhea    Intestinal giardiasis    Persistent bloody stools with mucus and diarrhea  Has had two weeks of worsening bloody mucus and diarrhea. Underwent stool studies 2/22/18, positive for Giardia antigen.  Was treated with 1 dose of tinidazole for positive Giardia 2/23/2018 (no other antibiotics recently); repeat giardia 2/28/2018 was negative.  However, repeat stool for Giardia antigen 3/3/18, done at this hospital admission, is again positive for Giardia.  Prescribed steroid taper by Dr. 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  CT abdomen/pelvis 3/4/2018 shows colonic wall thickening and mucosal enhancement involving rectum and distal sigmoid consistent with infectious coloprotitis vs inflammatory bowel disease.  Fecal lactoferrin has also been positive on two occasions, though this is a non-specific lab finding.  C diff negative most recently 3/4/18.    Differential diagnosis appears to be primarily persistent Giardiasis vs colitis as a side effect of Opdivo (package information describes an incidence of 2 to 3%).    Plan:   -  mg Q8H for a recommended 14 days of treatment, i.e. through 3/17/18, for treatment of Giardia.  Can convert to PO when less nauseated.   - Prednisone is stopped as above.   - GI recommends outpatient flex sig with biopsy, particularly if symptoms persist despite MNZ.   - Continue abdominal pain  management with oxycodone and/or hydromorphone.     - Advance diet as tolerated, continue IVF until PO intake improves.     Headache with history of migraines  Occipital headache that wraps around to frontal. Similar to prior migraines. Reports daily migraines stopped after diagnosis of her melanoma.  Had stopped all migraine medications and had not had headache recurrence until day of admission.  Does not report headache today.  - Watch for headache recurrence.       Metastatic malignant melanoma (H)  Diagnosed on lymph node biopsy in 10/2017. Primary site not identified. Follows with Dr. Richards. Last seen 2/22/2018. On immunotherapy with Opdivo (nivolumab) since November every 2 weeks, which was recently held due to persistent diarrhea. Reports on treatment 8 of planned 26.  Patient very concerned that current GI illness will exclude her from future Opdivo treatments.  Since we are attributing ongoing diarrhea and hematochezia to Giardia, I don't think this weill be the case, but will obviously defer decision to Dr. Ghosh.     Mild major depression  Anxiety  Stable.  - Continues on home venlafaxine     Mild intermittent asthma  Well-controlled. Not on home medications.  Current lung exam is normal.     Prothrombin Gene Mutation  History of stroke  Follows with Dr. Richards. History of stroke/TIA following both pregnancies. Hypercoagulability work up positive for prothrombin gene mutation. No history of DVT/PE.  - Hold home ASA given rectal bleeding.       DVT Prophylaxis: Pneumatic Compression Devices until hematochezia stopped, though I would like to get her on medical prophylaxis (enoxaparin) as soon as we can due to her prothrombin gene mutation.  Code Status: Full Code    Lines: Peripheral IV.   Major catheter: Not needed.    Discussion: As above. Continues to require inpatient care.    Disposition: Anticipate discharge in 3 to 5 days.     Attestation:  Amount of time performed on this hospital visit: 44 minutes,  "25 of which were spent in care coordination and counseling.    Jeffry Yeh       Interval History   Continues to have red blood per rectum in small amounts, no stool seen along with it.  Feels \"constipated\", and is hungry for the first time in a few days.  Abdominal pain is primarily bilateral lower quadrants, constant, occasionally sharp, without radiation.  Oxycodone or hydromorphone are effective control.    ROS:  CONSTITUTIONAL: NEGATIVE for chills, fever, sweats.  EYES: NEGATIVE for visual changes, eye irritation.  ENT/MOUTH: NEGATIVE for nasal congestion, postnasal drainage or sinus pressure.  RESP: NEGATIVE for dyspnea, cough, wheeze, or respiratory chest pain.  CV: NEGATIVE for chest pain, palpitations, orthopnea, or lower extremity edema.  GI: See above.  : NEGATIVE for dysuria or flank pain.  MUSCULOSKELETAL: NEGATIVE for back pain, joint pain, or joint swelling.  NEURO: NEGATIVE for focal numbness or weakness, syncope, stroke or seizure.  PSYCHIATRIC: Notable for anxiety due to uncertainty of clinical condition, but no panic.      Physical Exam   Temp:  [97.1  F (36.2  C)-98.8  F (37.1  C)] 97.7  F (36.5  C)  Pulse:  [69-71] 71  Resp:  [18] 18  BP: (100-127)/(58-77) 113/75  SpO2:  [95 %-99 %] 95 %    Weights:   Vitals:    03/04/18 1029 03/04/18 1632   Weight: 95.3 kg (210 lb) 96.5 kg (212 lb 11.9 oz)    Body mass index is 37.69 kg/(m^2).    GENERAL: Pleasant woman who appears comfortable but tearful lying in bed.  EYES: Eyes grossly normal to inspection, extraocular movements intact  HENT: Nares patent bilaterally.  Nasal mucosa normal, no discharge.    NECK: Trachea midline, no stridor.    RESP: No accessory muscle use.  Symmetrical breath sounds.  Lungs clear anteriorly on inspiration and expiration.  Expiration not prolonged, no wheeze.  CV: Regular rate and rhythm, non-tachycardic.  Normal S1 S2, no murmur or extra sound.  No lower extremity edema.  ABDOMEN: Soft, reports tenderness to light " palpation over all fields, but no guarding.  Liver and spleen not enlarged, no masses palpable.  Bowel sounds positive.  MS: No bony deformities noted.  No red or inflamed joints.  SKIN: Warm and dry, no rashes where skin visible.  NEURO: Alert, oriented, conversant.  Cranial nerves II - XII grossly intact.  No gross motor or sensory deficits.  Gait not assessed.  PSYCH: Calm, alert, conversant.  Able to articulate logical thoughts, no tangential thoughts, no hallucinations or delusions.  Affect normal; tearful when discussing her worries about her health and likelihood of recovery.         Data     Recent Labs  Lab 03/05/18  0925 03/04/18  1130 03/03/18  0813 03/01/18  1450   WBC 8.4 11.2* 12.8*  --    HGB 11.4* 12.0 12.0  --    MCV 86 84 86  --     364 371  --    NA  --  139 142 136   POTASSIUM  --  3.8 3.4 4.3   CHLORIDE  --  106 109 107   CO2  --  28 26 21   BUN  --  10 13 10   CR  --  0.62 0.69 0.52   ANIONGAP  --  5 7 8   PATRICIA  --  8.3* 8.5 8.7   GLC  --  67* 118* 167*   ALBUMIN  --  3.3* 3.5 3.2*   PROTTOTAL  --  7.0 7.3 7.7   BILITOTAL  --  0.1* 0.1* 0.3   ALKPHOS  --  65 63 75   ALT  --  60* 32 43   AST  --  39 17 31         Recent Labs  Lab 03/04/18  1130 03/03/18  0813 03/01/18  1450   GLC 67* 118* 167*        Unresulted Labs Ordered in the Past 30 Days of this Admission     Date and Time Order Name Status Description    3/3/2018 0900 Ova and Parasite Exam Routine In process            Imaging  No results found for this or any previous visit (from the past 24 hour(s)).     I reviewed all new labs and imaging results over the last 24 hours. I personally reviewed no images or EKG's today.    Medications     lactated ringers 125 mL/hr at 03/05/18 1118       acetaminophen  975 mg Oral Once     venlafaxine  150 mg Oral Daily with breakfast     metroNIDAZOLE  500 mg Intravenous Q8H       Jeffry Yeh

## 2018-03-05 NOTE — PLAN OF CARE
Problem: Patient Care Overview  Goal: Plan of Care/Patient Progress Review  Outcome: No Change  Patient reports no stool, but blood from rectum continues.  Denies nausea, but complaining of abdominal pain.  Received PO oxycodone x1 and IV Dilaudid x2 with minimal relief.  Up in room independently.  Starting on clear liquid diet this afternoon.  Advised patient to go slow with PO intake.

## 2018-03-06 ENCOUNTER — APPOINTMENT (OUTPATIENT)
Dept: ULTRASOUND IMAGING | Facility: CLINIC | Age: 43
DRG: 372 | End: 2018-03-06
Attending: FAMILY MEDICINE
Payer: COMMERCIAL

## 2018-03-06 LAB
ANION GAP SERPL CALCULATED.3IONS-SCNC: 5 MMOL/L (ref 3–14)
BUN SERPL-MCNC: 8 MG/DL (ref 7–30)
CALCIUM SERPL-MCNC: 7.9 MG/DL (ref 8.5–10.1)
CHLORIDE SERPL-SCNC: 104 MMOL/L (ref 94–109)
CO2 SERPL-SCNC: 29 MMOL/L (ref 20–32)
CREAT SERPL-MCNC: 0.65 MG/DL (ref 0.52–1.04)
ERYTHROCYTE [DISTWIDTH] IN BLOOD BY AUTOMATED COUNT: 13.2 % (ref 10–15)
GFR SERPL CREATININE-BSD FRML MDRD: >90 ML/MIN/1.7M2
GLUCOSE SERPL-MCNC: 91 MG/DL (ref 70–99)
HCT VFR BLD AUTO: 34.8 % (ref 35–47)
HGB BLD-MCNC: 11.3 G/DL (ref 11.7–15.7)
MCH RBC QN AUTO: 27.8 PG (ref 26.5–33)
MCHC RBC AUTO-ENTMCNC: 32.5 G/DL (ref 31.5–36.5)
MCV RBC AUTO: 86 FL (ref 78–100)
PLATELET # BLD AUTO: 336 10E9/L (ref 150–450)
POTASSIUM SERPL-SCNC: 3.6 MMOL/L (ref 3.4–5.3)
RBC # BLD AUTO: 4.06 10E12/L (ref 3.8–5.2)
SODIUM SERPL-SCNC: 138 MMOL/L (ref 133–144)
WBC # BLD AUTO: 8.4 10E9/L (ref 4–11)

## 2018-03-06 PROCEDURE — 36415 COLL VENOUS BLD VENIPUNCTURE: CPT

## 2018-03-06 PROCEDURE — 25000128 H RX IP 250 OP 636: Performed by: FAMILY MEDICINE

## 2018-03-06 PROCEDURE — 93970 EXTREMITY STUDY: CPT

## 2018-03-06 PROCEDURE — 12000000 ZZH R&B MED SURG/OB

## 2018-03-06 PROCEDURE — 25000125 ZZHC RX 250: Performed by: PHYSICIAN ASSISTANT

## 2018-03-06 PROCEDURE — 25000132 ZZH RX MED GY IP 250 OP 250 PS 637: Performed by: PHYSICIAN ASSISTANT

## 2018-03-06 PROCEDURE — 99233 SBSQ HOSP IP/OBS HIGH 50: CPT

## 2018-03-06 PROCEDURE — 85027 COMPLETE CBC AUTOMATED: CPT

## 2018-03-06 PROCEDURE — 80048 BASIC METABOLIC PNL TOTAL CA: CPT

## 2018-03-06 PROCEDURE — 25000128 H RX IP 250 OP 636: Performed by: PHYSICIAN ASSISTANT

## 2018-03-06 PROCEDURE — 25000132 ZZH RX MED GY IP 250 OP 250 PS 637: Performed by: FAMILY MEDICINE

## 2018-03-06 RX ORDER — LORAZEPAM 0.5 MG/1
0.5 TABLET ORAL
Status: COMPLETED | OUTPATIENT
Start: 2018-03-06 | End: 2018-03-06

## 2018-03-06 RX ADMIN — METRONIDAZOLE 500 MG: 500 INJECTION, SOLUTION INTRAVENOUS at 05:56

## 2018-03-06 RX ADMIN — OXYCODONE HYDROCHLORIDE 10 MG: 5 TABLET ORAL at 15:54

## 2018-03-06 RX ADMIN — Medication 0.5 MG: at 19:16

## 2018-03-06 RX ADMIN — LORAZEPAM 0.5 MG: 0.5 TABLET ORAL at 21:29

## 2018-03-06 RX ADMIN — Medication 1 MG: at 05:56

## 2018-03-06 RX ADMIN — ONDANSETRON 4 MG: 4 TABLET, ORALLY DISINTEGRATING ORAL at 18:08

## 2018-03-06 RX ADMIN — Medication 1 MG: at 10:48

## 2018-03-06 RX ADMIN — OXYCODONE HYDROCHLORIDE 10 MG: 5 TABLET ORAL at 08:41

## 2018-03-06 RX ADMIN — METRONIDAZOLE 500 MG: 500 INJECTION, SOLUTION INTRAVENOUS at 14:12

## 2018-03-06 RX ADMIN — Medication 1 MG: at 03:26

## 2018-03-06 RX ADMIN — ONDANSETRON 4 MG: 4 TABLET, ORALLY DISINTEGRATING ORAL at 10:13

## 2018-03-06 RX ADMIN — Medication 1 MG: at 22:52

## 2018-03-06 RX ADMIN — VENLAFAXINE HYDROCHLORIDE 150 MG: 150 TABLET, EXTENDED RELEASE ORAL at 08:35

## 2018-03-06 RX ADMIN — OXYCODONE HYDROCHLORIDE 10 MG: 5 TABLET ORAL at 12:39

## 2018-03-06 RX ADMIN — SODIUM CHLORIDE, POTASSIUM CHLORIDE, SODIUM LACTATE AND CALCIUM CHLORIDE: 600; 310; 30; 20 INJECTION, SOLUTION INTRAVENOUS at 05:53

## 2018-03-06 RX ADMIN — METRONIDAZOLE 500 MG: 500 INJECTION, SOLUTION INTRAVENOUS at 22:53

## 2018-03-06 NOTE — PROGRESS NOTES
[facility] Medicine Progress Note  Date of Service: 03/06/2018    Assessment & Plan   Doretha Fernandez is a 43 year old female with PMH metastatic melanoma, depression/anxiety, TIA, migraines and thrombosis disorder who presented 3/4/18 with bloody stools x 2 weeks.    Principal Problem:    Rectal bleeding  Active Problems:    Mild major depression (H)    Metastatic malignant melanoma (H)    Diarrhea    Intestinal giardiasis    Persistent bloody stools with mucus and diarrhea  Has had two weeks of worsening bloody mucus and diarrhea. Underwent stool studies 2/22/18, positive for Giardia antigen.  Was treated with 1 dose of tinidazole for positive Giardia 2/23/2018 (no other antibiotics recently); repeat giardia 2/28/2018 was negative.  However, repeat stool for Giardia antigen 3/3/18, done at this hospital admission, is again positive for Giardia.  Prescribed steroid taper by Dr. 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  CT abdomen/pelvis 3/4/2018 shows colonic wall thickening and mucosal enhancement involving rectum and distal sigmoid consistent with infectious coloprotitis vs inflammatory bowel disease.  Fecal lactoferrin has also been positive on two occasions, though this is a non-specific lab finding.  C diff negative most recently 3/4/18.  Differential diagnosis appears to be primarily persistent Giardiasis vs colitis as a side effect of Opdivo (package information describes an incidence of 2 to 3%).    Still having hematochezia several times so far today, but Hgb down only 0.7 g from admission.  Still having abdominal pain requiring oxycodone and hydromorphone, but has been able to eat last two meals (full liquid).    Plan:   - Continue  mg Q8H for a recommended 14 days of treatment, i.e. through  3/17/18, for treatment of Giardia.  Can convert to PO when less nauseated.   - Prednisone is stopped as above.   - GI recommends outpatient flex sig with biopsy, particularly if symptoms persist despite MNZ.  Could request flex sig earlier if hematochezia or other symptoms worsen or if develops evidence of systemic infection.   - Continue abdominal pain management with oxycodone and/or hydromorphone.     - Advance diet as tolerated, but will continue IVF until PO intake improves.     Headache with history of migraines  Occipital headache that wraps around to frontal. Similar to prior migraines. Reports daily migraines stopped after diagnosis of her melanoma.  Had stopped all migraine medications and had not had headache recurrence until day of admission.  Does not report headache today.  - Watch for headache recurrence.       Metastatic malignant melanoma (H)  Diagnosed on lymph node biopsy in 10/2017. Primary site not identified. Follows with Dr. Richards. Last seen 2/22/2018. On immunotherapy with Opdivo (nivolumab) since November every 2 weeks, which was recently held due to persistent diarrhea. Reports on treatment 8 of planned 26.  Patient very concerned that current GI illness will exclude her from future Opdivo treatments.  Since we are attributing ongoing diarrhea and hematochezia to Giardia, I don't think this will be the case, but will obviously defer decision to Dr. Ghosh.     Mild major depression  Anxiety  Stable.  - Continues on home venlafaxine     Mild intermittent asthma  Well-controlled. Not on home medications.  Current lung exam is normal.     Prothrombin Gene Mutation  History of stroke  Follows with Dr. Richards. History of stroke/TIA following both pregnancies. Hypercoagulability work up positive for prothrombin gene mutation. No history of DVT/PE.  - Hold home ASA given rectal bleeding.       DVT Prophylaxis: Pneumatic Compression Devices until hematochezia stopped, though I would like to get her  "on medical prophylaxis (enoxaparin) as soon as we can due to her prothrombin gene mutation.  Code Status: Full Code    Lines: Peripheral IV.   Major catheter: Not needed.    Discussion: As above. Continues to require inpatient care.    Disposition: Anticipate discharge in 3 to 5 days.     Attestation:  Amount of time performed on this hospital visit: 35 minutes, 20 of which were spent in care coordination and counseling.    Jeffry Yeh       Interval History   Continues to have red blood per rectum in small amounts, no stool seen along with it.  Feels \"constipated\".  Has had two \"full liquid diet\" meals with only mild nausea and no emesis.  Abdominal pain remains primarily bilateral lower quadrants, constant, occasionally sharp, without radiation.  Oxycodone or hydromorphone continue to be effective control.    ROS:  CONSTITUTIONAL: NEGATIVE for chills, fever, sweats.  EYES: NEGATIVE for visual changes, eye irritation.  ENT/MOUTH: NEGATIVE for nasal congestion, postnasal drainage or sinus pressure.  RESP: NEGATIVE for dyspnea, cough, wheeze, or respiratory chest pain.  CV: NEGATIVE for chest pain, palpitations, orthopnea, or lower extremity edema.  GI: See above.  : NEGATIVE for dysuria or flank pain.  MUSCULOSKELETAL: NEGATIVE for back pain, joint pain, or joint swelling.  NEURO: NEGATIVE for focal numbness or weakness, syncope, stroke or seizure.  PSYCHIATRIC: Notable for anxiety due to uncertainty of clinical condition, but no panic.      Physical Exam   Temp:  [97.5  F (36.4  C)-99.1  F (37.3  C)] 98.1  F (36.7  C)  Pulse:  [79-84] 81  Resp:  [18] 18  BP: (108-119)/(62-72) 108/62  SpO2:  [94 %-96 %] 94 %    Weights:   Vitals:    03/04/18 1029 03/04/18 1632   Weight: 95.3 kg (210 lb) 96.5 kg (212 lb 11.9 oz)    Body mass index is 37.69 kg/(m^2).    GENERAL: Pleasant woman who appeared to be sleeping comfortably when I came in, and was calm and conversant when awakened.     EYES: Eyes grossly normal to " inspection, extraocular movements intact.  HENT: Nares patent bilaterally.  Nasal mucosa normal, no discharge.    NECK: Trachea midline, no stridor.    RESP: No accessory muscle use.  Symmetrical breath sounds.  Lungs clear anteriorly on inspiration and expiration.  Expiration not prolonged, no wheeze.  CV: Regular rate and rhythm, non-tachycardic.  Normal S1 S2, no murmur or extra sound.  No lower extremity edema.  ABDOMEN: Soft, reports tenderness to light palpation over all fields, but no guarding.  Liver and spleen not enlarged, no masses palpable.  Bowel sounds positive.  MS: No bony deformities noted.  No red or inflamed joints.  SKIN: Warm and dry, no rashes where skin visible.  NEURO: Alert, oriented, conversant.  Cranial nerves II - XII grossly intact.  No gross motor or sensory deficits.  Gait not assessed.  PSYCH: Calm, alert, conversant.  Able to articulate logical thoughts, no tangential thoughts, no hallucinations or delusions.  Affect normal.         Data     Recent Labs  Lab 03/06/18  0545 03/05/18  0925 03/04/18  1130 03/03/18  0813   WBC 8.4 8.4 11.2* 12.8*   HGB 11.3* 11.4* 12.0 12.0   MCV 86 86 84 86    329 364 371     --  139 142   POTASSIUM 3.6  --  3.8 3.4   CHLORIDE 104  --  106 109   CO2 29  --  28 26   BUN 8  --  10 13   CR 0.65  --  0.62 0.69   ANIONGAP 5  --  5 7   PATRICIA 7.9*  --  8.3* 8.5   GLC 91  --  67* 118*   ALBUMIN  --   --  3.3* 3.5   PROTTOTAL  --   --  7.0 7.3   BILITOTAL  --   --  0.1* 0.1*   ALKPHOS  --   --  65 63   ALT  --   --  60* 32   AST  --   --  39 17         Recent Labs  Lab 03/06/18  0545 03/04/18  1130 03/03/18  0813 03/01/18  1450   GLC 91 67* 118* 167*        Unresulted Labs Ordered in the Past 30 Days of this Admission     No orders found from 1/3/2018 to 3/5/2018.           Imaging  No results found for this or any previous visit (from the past 24 hour(s)).     I reviewed all new labs and imaging results over the last 24 hours. I personally reviewed  no images or EKG's today.    Medications     lactated ringers 125 mL/hr at 03/06/18 0553       venlafaxine  150 mg Oral Daily with breakfast     metroNIDAZOLE  500 mg Intravenous Q8H       Jeffry Yeh

## 2018-03-06 NOTE — PLAN OF CARE
"Problem: Fluid Volume Deficit (Adult)  Goal: Optimal Fluid Balance  Patient will demonstrate the desired outcomes by discharge/transition of care.   Outcome: No Change  Pt alert, oriented, independent. IV fluids infusing. IV flagyl given. Prn dilaudid given for abdominal pain. Zofran given x 1 for nausea. Pt tolerating liquids. LS clear. 1 loose, red stool overnight. /66 (BP Location: Right arm)  Pulse 84  Temp 98.3  F (36.8  C) (Oral)  Resp 18  Ht 1.6 m (5' 3\")  Wt 96.5 kg (212 lb 11.9 oz)  SpO2 94%  BMI 37.69 kg/m2        "

## 2018-03-06 NOTE — PLAN OF CARE
"Problem: Patient Care Overview  Goal: Plan of Care/Patient Progress Review  Outcome: No Change  Pt has been up independently in room. Continues to have bright red blood in stools. C?o moderate to severe abd pain- receiving Dilaudid 0.5 mg every 2 hours prn, last given at 2000 with some relief. Also warm compresses applied. Was able to tolerate some clears with out nausea. IVF running at 125/hr. Will continue to monitor. /72 (BP Location: Right arm)  Pulse 79  Temp 99.1  F (37.3  C) (Oral)  Resp 18  Ht 1.6 m (5' 3\")  Wt 96.5 kg (212 lb 11.9 oz)  SpO2 94%  BMI 37.69 kg/m2  Celeste Naidu RN BSN        "

## 2018-03-06 NOTE — PLAN OF CARE
Problem: Patient Care Overview  Goal: Plan of Care/Patient Progress Review  Outcome: Improving  Pt reports she is still having several episodes of bleeding from rectum today, no stool, just blood.  Reports ongoing abdominal pain and intermittent nausea, but she has tolerated 100% of her meals today and had no emesis.  Advanced to full liquid diet at lunchtime.  Taking oxycodone 10mg every 4 hours for pain and requested dilaudid x 1 for breakthrough pain.  Continues on IV antibiotics.

## 2018-03-07 ENCOUNTER — APPOINTMENT (OUTPATIENT)
Dept: PHYSICAL THERAPY | Facility: CLINIC | Age: 43
DRG: 372 | End: 2018-03-07
Payer: COMMERCIAL

## 2018-03-07 PROBLEM — R25.2 BILATERAL LEG CRAMPS: Status: ACTIVE | Noted: 2018-03-07

## 2018-03-07 LAB
ANION GAP SERPL CALCULATED.3IONS-SCNC: 2 MMOL/L (ref 3–14)
BUN SERPL-MCNC: 5 MG/DL (ref 7–30)
CALCIUM SERPL-MCNC: 8.6 MG/DL (ref 8.5–10.1)
CHLORIDE SERPL-SCNC: 106 MMOL/L (ref 94–109)
CK SERPL-CCNC: 32 U/L (ref 30–225)
CO2 SERPL-SCNC: 30 MMOL/L (ref 20–32)
CREAT SERPL-MCNC: 0.64 MG/DL (ref 0.52–1.04)
ERYTHROCYTE [DISTWIDTH] IN BLOOD BY AUTOMATED COUNT: 13.4 % (ref 10–15)
GFR SERPL CREATININE-BSD FRML MDRD: >90 ML/MIN/1.7M2
GLUCOSE SERPL-MCNC: 147 MG/DL (ref 70–99)
HCT VFR BLD AUTO: 34.5 % (ref 35–47)
HGB BLD-MCNC: 11.1 G/DL (ref 11.7–15.7)
MAGNESIUM SERPL-MCNC: 2 MG/DL (ref 1.6–2.3)
MCH RBC QN AUTO: 27.8 PG (ref 26.5–33)
MCHC RBC AUTO-ENTMCNC: 32.2 G/DL (ref 31.5–36.5)
MCV RBC AUTO: 86 FL (ref 78–100)
PLATELET # BLD AUTO: 321 10E9/L (ref 150–450)
POTASSIUM SERPL-SCNC: 3.7 MMOL/L (ref 3.4–5.3)
RBC # BLD AUTO: 4 10E12/L (ref 3.8–5.2)
SODIUM SERPL-SCNC: 138 MMOL/L (ref 133–144)
WBC # BLD AUTO: 6.7 10E9/L (ref 4–11)

## 2018-03-07 PROCEDURE — 25000125 ZZHC RX 250: Performed by: PHYSICIAN ASSISTANT

## 2018-03-07 PROCEDURE — 25000132 ZZH RX MED GY IP 250 OP 250 PS 637: Performed by: PHYSICIAN ASSISTANT

## 2018-03-07 PROCEDURE — 82550 ASSAY OF CK (CPK): CPT

## 2018-03-07 PROCEDURE — 25000128 H RX IP 250 OP 636: Performed by: PHYSICIAN ASSISTANT

## 2018-03-07 PROCEDURE — 40000193 ZZH STATISTIC PT WARD VISIT: Performed by: PHYSICAL THERAPIST

## 2018-03-07 PROCEDURE — 99233 SBSQ HOSP IP/OBS HIGH 50: CPT

## 2018-03-07 PROCEDURE — 12000000 ZZH R&B MED SURG/OB

## 2018-03-07 PROCEDURE — 97161 PT EVAL LOW COMPLEX 20 MIN: CPT | Mod: GP | Performed by: PHYSICAL THERAPIST

## 2018-03-07 PROCEDURE — 25000128 H RX IP 250 OP 636: Performed by: FAMILY MEDICINE

## 2018-03-07 PROCEDURE — 83735 ASSAY OF MAGNESIUM: CPT

## 2018-03-07 PROCEDURE — 85027 COMPLETE CBC AUTOMATED: CPT

## 2018-03-07 PROCEDURE — 25000132 ZZH RX MED GY IP 250 OP 250 PS 637: Performed by: INTERNAL MEDICINE

## 2018-03-07 PROCEDURE — 80048 BASIC METABOLIC PNL TOTAL CA: CPT

## 2018-03-07 PROCEDURE — 36415 COLL VENOUS BLD VENIPUNCTURE: CPT

## 2018-03-07 RX ORDER — ROPINIROLE 0.25 MG/1
0.25 TABLET, FILM COATED ORAL AT BEDTIME
Status: DISCONTINUED | OUTPATIENT
Start: 2018-03-07 | End: 2018-03-08 | Stop reason: HOSPADM

## 2018-03-07 RX ADMIN — Medication 1 MG: at 22:44

## 2018-03-07 RX ADMIN — METRONIDAZOLE 500 MG: 500 INJECTION, SOLUTION INTRAVENOUS at 14:17

## 2018-03-07 RX ADMIN — OXYCODONE HYDROCHLORIDE 10 MG: 5 TABLET ORAL at 21:03

## 2018-03-07 RX ADMIN — OXYCODONE HYDROCHLORIDE 10 MG: 5 TABLET ORAL at 23:42

## 2018-03-07 RX ADMIN — OXYCODONE HYDROCHLORIDE 10 MG: 5 TABLET ORAL at 08:26

## 2018-03-07 RX ADMIN — METRONIDAZOLE 500 MG: 500 INJECTION, SOLUTION INTRAVENOUS at 06:02

## 2018-03-07 RX ADMIN — ACETAMINOPHEN 650 MG: 325 TABLET, FILM COATED ORAL at 14:45

## 2018-03-07 RX ADMIN — Medication 1 MG: at 18:01

## 2018-03-07 RX ADMIN — VENLAFAXINE HYDROCHLORIDE 150 MG: 150 TABLET, EXTENDED RELEASE ORAL at 08:27

## 2018-03-07 RX ADMIN — ROPINIROLE HYDROCHLORIDE 0.25 MG: 0.25 TABLET, FILM COATED ORAL at 23:42

## 2018-03-07 RX ADMIN — Medication 1 MG: at 04:50

## 2018-03-07 RX ADMIN — Medication 1 MG: at 02:39

## 2018-03-07 RX ADMIN — SODIUM CHLORIDE, POTASSIUM CHLORIDE, SODIUM LACTATE AND CALCIUM CHLORIDE: 600; 310; 30; 20 INJECTION, SOLUTION INTRAVENOUS at 13:16

## 2018-03-07 RX ADMIN — OXYCODONE HYDROCHLORIDE 10 MG: 5 TABLET ORAL at 11:36

## 2018-03-07 RX ADMIN — ACETAMINOPHEN 650 MG: 325 TABLET, FILM COATED ORAL at 08:26

## 2018-03-07 RX ADMIN — Medication 1 MG: at 13:20

## 2018-03-07 RX ADMIN — ACETAMINOPHEN 650 MG: 325 TABLET, FILM COATED ORAL at 21:04

## 2018-03-07 RX ADMIN — METRONIDAZOLE 500 MG: 500 INJECTION, SOLUTION INTRAVENOUS at 21:32

## 2018-03-07 RX ADMIN — OXYCODONE HYDROCHLORIDE 10 MG: 5 TABLET ORAL at 14:45

## 2018-03-07 RX ADMIN — SODIUM CHLORIDE, POTASSIUM CHLORIDE, SODIUM LACTATE AND CALCIUM CHLORIDE: 600; 310; 30; 20 INJECTION, SOLUTION INTRAVENOUS at 03:45

## 2018-03-07 NOTE — PLAN OF CARE
"Problem: Patient Care Overview  Goal: Plan of Care/Patient Progress Review  Alert and oriented. VSS. Up independently in room.  LS clear, cough/deep breathing encouraged.  C/o pain in bilateral calf muscles, MD notified and US completed (see epic for results).  Patient encouraged to get up and walk- patient stated it relieved some pain.  Patient appears to be very anxious, PRN dose of Ativan given with stated adequate relief.  IV Dilaudid given for c/o stomach pain with stated adequate relief, encouraged the use of oral analgesics explaining that patient will not be able to treat with IV narcotics upon discharge. /68 (BP Location: Right arm)  Pulse 88  Temp 98.3  F (36.8  C) (Oral)  Resp 18  Ht 1.6 m (5' 3\")  Wt 96.5 kg (212 lb 11.9 oz)  SpO2 92%  BMI 37.69 kg/m2        "

## 2018-03-07 NOTE — PROGRESS NOTES
03/07/18 1600   Quick Adds   Type of Visit Initial PT Evaluation   Living Environment   Lives With child(gold), dependent   Living Arrangements house   Home Accessibility stairs to enter home;stairs within home   Number of Stairs to Enter Home 3   Number of Stairs Within Home 7   Stair Railings at Home inside, present on left side   Functional Level Prior   Ambulation 0-->independent   Transferring 0-->independent   Toileting 0-->independent   Bathing 0-->independent   Dressing 0-->independent   Eating 0-->independent   Communication 0-->understands/communicates without difficulty   Swallowing 0-->swallows foods/liquids without difficulty   Cognition 0 - no cognition issues reported   Prior Functional Level Comment PLOF- Pt indep. with ambulation with no device.    General Information   Onset of Illness/Injury or Date of Surgery - Date 03/06/18   Referring Physician Rao   Patient/Family Goals Statement Pt w/ goal  of decreasing her pain   Pertinent History of Current Problem (include personal factors and/or comorbidities that impact the POC) 43 year old female with PMH metastatic melanoma, depression/anxiety, TIA, migraines and thrombosis disorder who presented 3/4/18 with bloody stools x 2 weeks. admitted  with persistent bloody stools   General Observations Pt alert, pleasant- reports pain in bilateral LES - diffuse non dermatomal  Pain.- no  alleviating factors.   Pain moderate, severe at times   Pain Assessment   Patient Currently in Pain Yes, see Vital Sign flowsheet   Range of Motion (ROM)   ROM Comment WFL. No pain w/ ROM. Hamstring length to 45 degrees bilaterally  w/o SX   Strength   Strength Comments Good LE strength. No c/o weakness   Bed Mobility   Bed Mobility Comments indep.   Transfer Skills   Transfer Comments indep.   Gait   Gait Comments Indep.= pt amb. 120 feet x2 w/ o no device,steady gait, good rate, able to navigate steps w/ unilateral support with carryover pattern   Balance   Balance  "Comments good   Sensory Examination   Sensory Perception no deficits were identified   Clinical Impression   Criteria for Skilled Therapeutic Intervention evaluation only   Risk & Benefits of therapy have been explained Yes   Patient, Family & other staff in agreement with plan of care Yes   Clinical Impression Comments Evaluation only.  Pt scored 24/24 on 6 clicks Basic mobility assessment, to continue amb. w/ nursing staff while hospitalized.- encouraged pt to amb as able, monitor Sx.   SX constant and do not increase w/ amb. HEP not issued as no deficits noted w/ strength, ROM.  Unclear etiology to leg pain-  Pt to discuss w/ MD   [facility] AM-PAC  \"6 Clicks\" V.2 Basic Mobility Inpatient Short Form   1. Turning from your back to your side while in a flat bed without using bedrails? 4 - None   2. Moving from lying on your back to sitting on the side of a flat bed without using bedrails? 4 - None   3. Moving to and from a bed to a chair (including a wheelchair)? 4 - None   4. Standing up from a chair using your arms (e.g., wheelchair, or bedside chair)? 4 - None   5. To walk in hospital room? 4 - None   6. Climbing 3-5 steps with a railing? 4 - None   Basic Mobility Raw Score (Score out of 24.Lower scores equate to lower levels of function) 24   Total Evaluation Time   Total Evaluation Time (Minutes) 20     "

## 2018-03-07 NOTE — PROGRESS NOTES
INTERVAL NOTE    Discussed face to face this patient's case with Dr. Richards.  Dr. Richards feels that endoscopy is warranted, as he has at least a moderate clinical suspicion that the patient's colitis may be due to Opdivo toxicity rather than Giardia.    I therefore contacted the GI On-Call line at [facility].  Physician on-call, as it turns out, is aware of this patient's case; the ED physician had contacted him on day of admission.  I updated on-call doc about clinical progress thus far, including ongoing abdominal symptoms and maroon mucous-like BM.  On-call physician concurs that endoscopy with biopsy is warranted at this point.  He advises that I should contact general surgery here at [facility] to request endoscopy with biopsy, so I have a page into Dr. Lin to discuss this.    On-call physician recommends continuation of  mg Q8H, and that we continue to withhold systemic steroids, until endoscopy results are available.    Jeffry Yeh MD  Hospitalist

## 2018-03-07 NOTE — PLAN OF CARE
Notified MD at 1945 PM regarding patient compaints of bilateral calf pain.  .      Spoke with: Dr. Romero    Orders were obtained.    Comments: Bilateral US ordered, will await results.

## 2018-03-07 NOTE — PROGRESS NOTES
[facility]    Hospital Medicine Progress Note  Date of Service: 03/07/2018    Assessment & Plan   Doretha Fernandez is a 43 year old female with PMH metastatic melanoma, depression/anxiety, TIA, migraines and thrombosis disorder who presented 3/4/18 with bloody stools x 2 weeks.    Persistent bloody stools with mucus and diarrhea, Intestinal giardiasis  Has had two weeks of worsening bloody mucus and diarrhea. Underwent stool studies 2/22/18, positive for Giardia antigen.  Was treated with 1 dose of tinidazole for positive Giardia 2/23/2018 (no other antibiotics recently); repeat giardia 2/28/2018 was negative.  However, repeat stool for Giardia antigen 3/3/18, done at this hospital admission, is again positive for Giardia.  Prescribed steroid taper by Dr. 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  CT abdomen/pelvis 3/4/2018 shows colonic wall thickening and mucosal enhancement involving rectum and distal sigmoid consistent with infectious coloprotitis vs inflammatory bowel disease.  Fecal lactoferrin has also been positive on two occasions, though this is a non-specific lab finding.  C diff negative most recently 3/4/18.  Differential diagnosis appears to be primarily persistent Giardiasis vs colitis as a side effect of Opdivo (package information describes an incidence of 2 to 3%).    Still having hematochezia, reports three episodes so far today.  Specimens are being saved in a hat; she is passing a maroon/brown, mucous consistency substance, total amount in the hat after one BM is probably 4 Tbsp.  As of yesterday, Hgb was down only 0.7 g from admission, so it doesn't look like she's losing a lot of blood at this point.  Still having abdominal pain requiring oxycodone and hydromorphone, but has been able to eat a full  liquid diet.  She is concerned that she is still having these GI symptoms despite being on MNZ for the past 4 days.  I advised her that rapid resolution of abdominal and GI symptoms may not be a realistic expectation, and reminded her that GI recommends three weeks of MNZ therapy for a reason.  She cried when I mentioned that she might be symptomatic for a week or two longer.    Plan:   - Per [facility] phone recommendations, will continue  mg Q8H for a recommended 14 days of treatment, i.e. through 3/17/18, for treatment of Giardia.  Can convert to PO when less nauseated.   - Prednisone is stopped as above.   - GI recommends outpatient flex sig with biopsy, particularly if symptoms persist despite MNZ.  Could request flex sig earlier if hematochezia or other symptoms worsen or if develops evidence of systemic infection.  Patient is concerned about duration of her symptoms, requests that I discuss with GI team the possibility of transfer.  I will do so today or tomorrow, but due to her general clinical stability, I'm not sure they'll advise transfer, which I told her.   - Continue abdominal pain management with oxycodone and/or hydromorphone.     - Advance diet as tolerated, but will continue IVF until PO intake normalizes.     Headache with history of migraines  Occipital headache that wraps around to frontal. Similar to prior migraines. Reports daily migraines stopped after diagnosis of her melanoma.  Had stopped all migraine medications and had not had headache recurrence until day of admission.  Does not report headache today.  - Watch for headache recurrence.       Metastatic malignant melanoma (H)  Diagnosed on lymph node biopsy in 10/2017. Primary site not identified. Follows with Dr. Richards. Last seen 2/22/2018. On immunotherapy with Opdivo (nivolumab) since November every 2 weeks, which was recently held due to persistent diarrhea. Reports on treatment 8 of planned 26.  Patient very concerned that  "current GI illness will exclude her from future Opdivo treatments.  Since we are attributing ongoing diarrhea and hematochezia to Giardia, I don't think this will be the case, but will obviously defer decision to Dr. Ghosh.     Mild major depression  Anxiety  Depression is worsened due to current illness, separation from her kids, concern about her medical future due to melanoma, and she and her ex- are experiencing friction as he is the primary caregiver while she is hospitalized.  Was tearful throughout this entire visit.  - Continues on home venlafaxine.  I'm going to discuss with Spiritual Care; maybe conversation and listening will help.     Mild intermittent asthma  Well-controlled. Not on home medications.  Current lung exam is normal.     Prothrombin Gene Mutation  History of stroke  Follows with Dr. Richards. History of stroke/TIA following both pregnancies. Hypercoagulability work up positive for prothrombin gene mutation. No history of DVT/PE.  - Hold home ASA given rectal bleeding.    Bilateral leg cramps  Started overnight, describes sharp \"cramps\" circumferentially of both legs, from mid-thigh distally.  Not relieved by ambulation.  Is her chief complaint today.  Exam is unremarkable.  Bilateral venous imaging 3/6/18 was negative for DVT.  - Will send Mg, electrolytes, and CPK.  - Begin PT for ambulation, stretching, ROM.          DVT Prophylaxis: Pneumatic Compression Devices until hematochezia stopped, though I would like to get her on medical prophylaxis (enoxaparin) as soon as we can due to her prothrombin gene mutation.  Code Status: Full Code    Lines: Peripheral IV.   Major catheter: Not needed.    Discussion: As above. Continues to require inpatient care.    Disposition: Anticipate discharge in 3 to 5 days.     Attestation:  Amount of time performed on this hospital visit: 35 minutes, 20 of which were spent in care coordination and counseling.    Jeffry Yeh       Interval History " "  Described above.  Her abdominal symptoms are much the same, aside from the fact that she is able to eat a bit more now.  She is very worried and tearful today: \"Something just isn't right, this isn't getting any better\".    ROS:  CONSTITUTIONAL: NEGATIVE for chills, fever, sweats.  EYES: NEGATIVE for visual changes, eye irritation.  ENT/MOUTH: NEGATIVE for nasal congestion, postnasal drainage or sinus pressure.  RESP: NEGATIVE for dyspnea, cough, wheeze, or respiratory chest pain.  CV: NEGATIVE for chest pain, palpitations, orthopnea, or lower extremity edema.  GI: See above.  : NEGATIVE for dysuria or flank pain.  MUSCULOSKELETAL: NEGATIVE for back pain, joint pain, or joint swelling.  See above re leg cramps.  NEURO: NEGATIVE for focal numbness or weakness, syncope, stroke or seizure.  PSYCHIATRIC: Anxiety and depression are exacerbated by current illness, details above.      Physical Exam   Temp:  [97.4  F (36.3  C)-98.4  F (36.9  C)] 98.4  F (36.9  C)  Pulse:  [81-88] 88  Resp:  [18] 18  BP: (104-117)/(61-69) 111/69  SpO2:  [92 %-94 %] 94 %    Weights:   Vitals:    03/04/18 1029 03/04/18 1632   Weight: 95.3 kg (210 lb) 96.5 kg (212 lb 11.9 oz)    Body mass index is 37.69 kg/(m^2).    GENERAL: Pleasant woman, lying in bed, appeared to be dozing off but awoke to be alert and conversant.   EYES: Eyes grossly normal to inspection, extraocular movements intact.  HENT: Nares patent bilaterally.  Nasal mucosa normal, no discharge.    NECK: Trachea midline, no stridor.    RESP: No accessory muscle use.  Symmetrical breath sounds.  Lungs clear anteriorly on inspiration and expiration.  Expiration not prolonged, no wheeze.  CV: Regular rate and rhythm, non-tachycardic.  Normal S1 S2, no murmur or extra sound.  No lower extremity edema.  ABDOMEN: Soft, describes tenderness to light palpation over all fields, but no guarding.  Liver and spleen not enlarged, no masses palpable.  Bowel sounds positive.  MS: No bony " deformities noted.  No red or inflamed joints.  SKIN: Warm and dry, no rashes where skin visible.  NEURO: Alert, oriented, conversant.  Cranial nerves II - XII grossly intact.  No gross motor or sensory deficits.  Gait not assessed.  PSYCH: Alert, conversant.  Able to articulate logical thoughts, no tangential thoughts, no hallucinations or delusions.  Tearful throughout most of visit, describing worries regarding current illness and future health, problems with her .        Data     Recent Labs  Lab 03/06/18  0545 03/05/18  0925 03/04/18  1130 03/03/18  0813   WBC 8.4 8.4 11.2* 12.8*   HGB 11.3* 11.4* 12.0 12.0   MCV 86 86 84 86    329 364 371     --  139 142   POTASSIUM 3.6  --  3.8 3.4   CHLORIDE 104  --  106 109   CO2 29  --  28 26   BUN 8  --  10 13   CR 0.65  --  0.62 0.69   ANIONGAP 5  --  5 7   PATRICIA 7.9*  --  8.3* 8.5   GLC 91  --  67* 118*   ALBUMIN  --   --  3.3* 3.5   PROTTOTAL  --   --  7.0 7.3   BILITOTAL  --   --  0.1* 0.1*   ALKPHOS  --   --  65 63   ALT  --   --  60* 32   AST  --   --  39 17         Recent Labs  Lab 03/06/18  0545 03/04/18  1130 03/03/18  0813 03/01/18  1450   GLC 91 67* 118* 167*        Unresulted Labs Ordered in the Past 30 Days of this Admission     No orders found from 1/3/2018 to 3/5/2018.           Imaging  Recent Results (from the past 24 hour(s))   US leg bilateral venous    Narrative    VENOUS ULTRASOUND BOTH LEGS  3/6/2018 8:36 PM     HISTORY: rule out DVT;     COMPARISON: None.    FINDINGS: Examination of the deep veins with graded compression and  color flow Doppler with spectral wave form analysis shows  no evidence  of thrombus in the common femoral vein, femoral vein, popliteal vein  or calf veins.  There is no venous insufficiency.      Impression    IMPRESSION: No DVT is identified in either lower extremity    JD HAYES MD        I reviewed all new labs and imaging results over the last 24 hours. I personally reviewed no images or EKG's  today.    Medications     lactated ringers 125 mL/hr at 03/07/18 0345       venlafaxine  150 mg Oral Daily with breakfast     metroNIDAZOLE  500 mg Intravenous Q8H       Jeffry Yeh

## 2018-03-07 NOTE — PLAN OF CARE
Problem: Patient Care Overview  Goal: Plan of Care/Patient Progress Review  Outcome: Improving  Pt continues to report abdominal pain and is taking 10mg oxycodone every 3 hours for pain.  Also requested and given IV dilaudid x 1 today between oxycodone doses.  Pt reports worsening leg pain.  Denies numbness or tingling, describes as muscle aches.  Pt has been encouraged to ambulate, did ambulate in [place]nandez x 1 earlier today with SBA of this writer, declined this afternoon, wanted to wait a while after receiving pain meds.  Denies nausea today and was advanced to a regular diet and tolerating that.  Pt reporting ongoing blood from rectum, specimen hats placed in toilet to monitor output - since this morning pt has had one small (about quarter size) amount of blood and one other even smaller amount.  Voiding well.

## 2018-03-07 NOTE — PLAN OF CARE
Notified MD at 2045 PM regarding lab results and new orders.      Spoke with: N/A    Orders were obtained.    Comments: (US results available in EPIC, can she get a one time dose of Ativan? pt anxious.)

## 2018-03-07 NOTE — PROGRESS NOTES
"Pt reported having an emesis following a full liquid dinner tonight as well as passing \"just blood\" per rectum   Asked pt not to flush the toilet and to save all stools, emesis for nurse to view. Medicated with Zofran for nausea.   Pt is resting comfortably, talking on telephone.  "

## 2018-03-08 ENCOUNTER — HOSPITAL ENCOUNTER (INPATIENT)
Facility: CLINIC | Age: 43
LOS: 2 days | Discharge: HOME | DRG: 394 | End: 2018-03-10
Attending: INTERNAL MEDICINE | Admitting: INTERNAL MEDICINE
Payer: COMMERCIAL

## 2018-03-08 VITALS
HEART RATE: 80 BPM | DIASTOLIC BLOOD PRESSURE: 70 MMHG | OXYGEN SATURATION: 95 % | TEMPERATURE: 98.6 F | SYSTOLIC BLOOD PRESSURE: 115 MMHG | RESPIRATION RATE: 16 BRPM | WEIGHT: 212.74 LBS | HEIGHT: 63 IN | BODY MASS INDEX: 37.7 KG/M2

## 2018-03-08 DIAGNOSIS — K52.9 PROCTOCOLITIS: Primary | ICD-10-CM

## 2018-03-08 DIAGNOSIS — F41.1 ANXIETY STATE: ICD-10-CM

## 2018-03-08 DIAGNOSIS — A07.1 INTESTINAL GIARDIASIS: ICD-10-CM

## 2018-03-08 DIAGNOSIS — C43.9 METASTATIC MALIGNANT MELANOMA (H): ICD-10-CM

## 2018-03-08 PROCEDURE — 25000132 ZZH RX MED GY IP 250 OP 250 PS 637: Performed by: INTERNAL MEDICINE

## 2018-03-08 PROCEDURE — 25000128 H RX IP 250 OP 636: Performed by: INTERNAL MEDICINE

## 2018-03-08 PROCEDURE — 99239 HOSP IP/OBS DSCHRG MGMT >30: CPT

## 2018-03-08 PROCEDURE — 40000556 ZZH STATISTIC PERIPHERAL IV START W US GUIDANCE

## 2018-03-08 PROCEDURE — 25000132 ZZH RX MED GY IP 250 OP 250 PS 637: Performed by: NURSE PRACTITIONER

## 2018-03-08 PROCEDURE — 25000128 H RX IP 250 OP 636: Performed by: NURSE PRACTITIONER

## 2018-03-08 PROCEDURE — 25000128 H RX IP 250 OP 636: Performed by: PHYSICIAN ASSISTANT

## 2018-03-08 PROCEDURE — 25000132 ZZH RX MED GY IP 250 OP 250 PS 637: Performed by: PHYSICIAN ASSISTANT

## 2018-03-08 PROCEDURE — 25000125 ZZHC RX 250: Performed by: PHYSICIAN ASSISTANT

## 2018-03-08 PROCEDURE — 99223 1ST HOSP IP/OBS HIGH 75: CPT | Mod: AI | Performed by: INTERNAL MEDICINE

## 2018-03-08 PROCEDURE — 12000008 ZZH R&B INTERMEDIATE UMMC

## 2018-03-08 RX ORDER — HYDROMORPHONE HYDROCHLORIDE 1 MG/ML
.5-1 INJECTION, SOLUTION INTRAMUSCULAR; INTRAVENOUS; SUBCUTANEOUS
Status: DISCONTINUED | OUTPATIENT
Start: 2018-03-08 | End: 2018-03-10

## 2018-03-08 RX ORDER — VENLAFAXINE HYDROCHLORIDE 150 MG/1
150 CAPSULE, EXTENDED RELEASE ORAL
Status: DISCONTINUED | OUTPATIENT
Start: 2018-03-09 | End: 2018-03-10 | Stop reason: HOSPADM

## 2018-03-08 RX ORDER — ONDANSETRON 2 MG/ML
8 INJECTION INTRAMUSCULAR; INTRAVENOUS EVERY 8 HOURS PRN
Status: DISCONTINUED | OUTPATIENT
Start: 2018-03-08 | End: 2018-03-10

## 2018-03-08 RX ORDER — LORAZEPAM 0.5 MG/1
.5-1 TABLET ORAL EVERY 4 HOURS PRN
Status: DISCONTINUED | OUTPATIENT
Start: 2018-03-08 | End: 2018-03-10 | Stop reason: HOSPADM

## 2018-03-08 RX ORDER — SODIUM CHLORIDE, SODIUM LACTATE, POTASSIUM CHLORIDE, CALCIUM CHLORIDE 600; 310; 30; 20 MG/100ML; MG/100ML; MG/100ML; MG/100ML
INJECTION, SOLUTION INTRAVENOUS CONTINUOUS
Status: DISCONTINUED | OUTPATIENT
Start: 2018-03-08 | End: 2018-03-10

## 2018-03-08 RX ORDER — OXYCODONE HYDROCHLORIDE 5 MG/1
5-10 TABLET ORAL EVERY 4 HOURS PRN
Status: DISCONTINUED | OUTPATIENT
Start: 2018-03-08 | End: 2018-03-10

## 2018-03-08 RX ORDER — OXYCODONE HYDROCHLORIDE 5 MG/1
5-10 TABLET ORAL
Status: DISCONTINUED | OUTPATIENT
Start: 2018-03-08 | End: 2018-03-08

## 2018-03-08 RX ORDER — LORAZEPAM 2 MG/ML
0.5 INJECTION INTRAMUSCULAR ONCE
Status: COMPLETED | OUTPATIENT
Start: 2018-03-08 | End: 2018-03-09

## 2018-03-08 RX ORDER — ONDANSETRON 8 MG/1
8 TABLET, FILM COATED ORAL EVERY 8 HOURS PRN
Status: DISCONTINUED | OUTPATIENT
Start: 2018-03-08 | End: 2018-03-08 | Stop reason: CLARIF

## 2018-03-08 RX ORDER — LORAZEPAM 1 MG/1
1-2 TABLET ORAL
Status: DISCONTINUED | OUTPATIENT
Start: 2018-03-08 | End: 2018-03-08 | Stop reason: HOSPADM

## 2018-03-08 RX ORDER — ONDANSETRON 4 MG/1
8 TABLET, ORALLY DISINTEGRATING ORAL EVERY 8 HOURS PRN
Status: DISCONTINUED | OUTPATIENT
Start: 2018-03-08 | End: 2018-03-08 | Stop reason: HOSPADM

## 2018-03-08 RX ORDER — ONDANSETRON 8 MG/1
8 TABLET, FILM COATED ORAL EVERY 8 HOURS PRN
Status: DISCONTINUED | OUTPATIENT
Start: 2018-03-08 | End: 2018-03-10 | Stop reason: HOSPADM

## 2018-03-08 RX ORDER — METRONIDAZOLE 500 MG/1
500 TABLET ORAL EVERY 8 HOURS SCHEDULED
Status: DISCONTINUED | OUTPATIENT
Start: 2018-03-08 | End: 2018-03-10 | Stop reason: HOSPADM

## 2018-03-08 RX ORDER — ROPINIROLE 0.25 MG/1
0.25 TABLET, FILM COATED ORAL AT BEDTIME
Status: DISCONTINUED | OUTPATIENT
Start: 2018-03-08 | End: 2018-03-10 | Stop reason: HOSPADM

## 2018-03-08 RX ORDER — ONDANSETRON 2 MG/ML
8 INJECTION INTRAMUSCULAR; INTRAVENOUS EVERY 8 HOURS PRN
Status: DISCONTINUED | OUTPATIENT
Start: 2018-03-08 | End: 2018-03-08 | Stop reason: CLARIF

## 2018-03-08 RX ORDER — OXYCODONE HYDROCHLORIDE 5 MG/1
5-10 TABLET ORAL
Qty: 18 TABLET | Refills: 0 | Status: ON HOLD | DISCHARGE
Start: 2018-03-08 | End: 2018-03-10

## 2018-03-08 RX ORDER — SODIUM CHLORIDE 9 MG/ML
INJECTION, SOLUTION INTRAVENOUS CONTINUOUS
Status: DISCONTINUED | OUTPATIENT
Start: 2018-03-08 | End: 2018-03-08

## 2018-03-08 RX ORDER — ACETAMINOPHEN 500 MG
1000 TABLET ORAL EVERY 8 HOURS PRN
Status: DISCONTINUED | OUTPATIENT
Start: 2018-03-08 | End: 2018-03-10 | Stop reason: HOSPADM

## 2018-03-08 RX ORDER — PROCHLORPERAZINE MALEATE 5 MG
5-10 TABLET ORAL EVERY 6 HOURS PRN
Status: DISCONTINUED | OUTPATIENT
Start: 2018-03-08 | End: 2018-03-08

## 2018-03-08 RX ORDER — OXYCODONE HYDROCHLORIDE 5 MG/1
5-10 TABLET ORAL
Status: DISCONTINUED | OUTPATIENT
Start: 2018-03-08 | End: 2018-03-08 | Stop reason: HOSPADM

## 2018-03-08 RX ORDER — HYDROMORPHONE HYDROCHLORIDE 1 MG/ML
.5-1 INJECTION, SOLUTION INTRAMUSCULAR; INTRAVENOUS; SUBCUTANEOUS
Status: DISCONTINUED | OUTPATIENT
Start: 2018-03-08 | End: 2018-03-08 | Stop reason: HOSPADM

## 2018-03-08 RX ORDER — VENLAFAXINE HYDROCHLORIDE 150 MG/1
150 TABLET, EXTENDED RELEASE ORAL
Status: DISCONTINUED | OUTPATIENT
Start: 2018-03-09 | End: 2018-03-08

## 2018-03-08 RX ORDER — PROCHLORPERAZINE MALEATE 5 MG
5-10 TABLET ORAL EVERY 6 HOURS PRN
Status: DISCONTINUED | OUTPATIENT
Start: 2018-03-08 | End: 2018-03-08 | Stop reason: HOSPADM

## 2018-03-08 RX ORDER — ONDANSETRON 8 MG/1
8 TABLET, ORALLY DISINTEGRATING ORAL EVERY 8 HOURS PRN
Status: DISCONTINUED | OUTPATIENT
Start: 2018-03-08 | End: 2018-03-10 | Stop reason: HOSPADM

## 2018-03-08 RX ORDER — NALOXONE HYDROCHLORIDE 0.4 MG/ML
.1-.4 INJECTION, SOLUTION INTRAMUSCULAR; INTRAVENOUS; SUBCUTANEOUS
Status: DISCONTINUED | OUTPATIENT
Start: 2018-03-08 | End: 2018-03-10 | Stop reason: HOSPADM

## 2018-03-08 RX ADMIN — OXYCODONE HYDROCHLORIDE 10 MG: 5 TABLET ORAL at 17:10

## 2018-03-08 RX ADMIN — ACETAMINOPHEN 650 MG: 325 TABLET, FILM COATED ORAL at 12:32

## 2018-03-08 RX ADMIN — METRONIDAZOLE 500 MG: 500 TABLET ORAL at 21:16

## 2018-03-08 RX ADMIN — ONDANSETRON 8 MG: 2 INJECTION INTRAMUSCULAR; INTRAVENOUS at 22:48

## 2018-03-08 RX ADMIN — LORAZEPAM 1 MG: 1 TABLET ORAL at 00:36

## 2018-03-08 RX ADMIN — Medication 0.5 MG: at 23:50

## 2018-03-08 RX ADMIN — SODIUM CHLORIDE, POTASSIUM CHLORIDE, SODIUM LACTATE AND CALCIUM CHLORIDE: 600; 310; 30; 20 INJECTION, SOLUTION INTRAVENOUS at 00:07

## 2018-03-08 RX ADMIN — ROPINIROLE HYDROCHLORIDE 0.25 MG: 0.25 TABLET, FILM COATED ORAL at 21:45

## 2018-03-08 RX ADMIN — VENLAFAXINE HYDROCHLORIDE 150 MG: 150 TABLET, EXTENDED RELEASE ORAL at 11:35

## 2018-03-08 RX ADMIN — LORAZEPAM 0.5 MG: 0.5 TABLET ORAL at 21:16

## 2018-03-08 RX ADMIN — LORAZEPAM 1 MG: 1 TABLET ORAL at 04:57

## 2018-03-08 RX ADMIN — OXYCODONE HYDROCHLORIDE 10 MG: 5 TABLET ORAL at 12:31

## 2018-03-08 RX ADMIN — METRONIDAZOLE 500 MG: 500 INJECTION, SOLUTION INTRAVENOUS at 07:09

## 2018-03-08 RX ADMIN — METRONIDAZOLE 500 MG: 500 INJECTION, SOLUTION INTRAVENOUS at 14:08

## 2018-03-08 RX ADMIN — SODIUM CHLORIDE, POTASSIUM CHLORIDE, SODIUM LACTATE AND CALCIUM CHLORIDE: 600; 310; 30; 20 INJECTION, SOLUTION INTRAVENOUS at 21:20

## 2018-03-08 RX ADMIN — POLYETHYLENE GLYCOL 3350, SODIUM SULFATE ANHYDROUS, SODIUM BICARBONATE, SODIUM CHLORIDE, POTASSIUM CHLORIDE 4000 ML: 236; 22.74; 6.74; 5.86; 2.97 POWDER, FOR SOLUTION ORAL at 21:29

## 2018-03-08 RX ADMIN — OXYCODONE HYDROCHLORIDE 10 MG: 5 TABLET ORAL at 04:53

## 2018-03-08 RX ADMIN — ACETAMINOPHEN 650 MG: 325 TABLET, FILM COATED ORAL at 04:53

## 2018-03-08 ASSESSMENT — PAIN DESCRIPTION - DESCRIPTORS: DESCRIPTORS: CRAMPING

## 2018-03-08 NOTE — PLAN OF CARE
"Problem: Patient Care Overview  Goal: Plan of Care/Patient Progress Review  Outcome: No Change  Patient A&O, anxious. Independent in her room. Reporting abdominal pain/discomfort, PRN oxycodone administered. Lungs clear, VS stable on room air. Patient is afebrile. Continues to have frequent pink liquid stools, patient reports 1-3 an hour when she is awake. Patient updated family as to plans to transfer [facility]. Awaiting EMS rig. /70 (BP Location: Right arm)  Pulse 80  Temp 98.6  F (37  C) (Oral)  Resp 16  Ht 1.6 m (5' 3\")  Wt 96.5 kg (212 lb 11.9 oz)  SpO2 95%  BMI 37.69 kg/m2        "

## 2018-03-08 NOTE — PLAN OF CARE
Problem: Patient Care Overview  Goal: Plan of Care/Patient Progress Review  Outcome: No Change  Pt had several crying spells during evening and night, Gave meds available, also tried heat, ice, aromatherapy, therapeutic listening (stated several fears which she has) and discussion and finally obtained order for Ativan which she states she takes occasionally at home, after this was given she was able to sleep for several hours at a times, when awake seemed more comfortable. No BRB per rectum, had several scant episodes of passing approx. 10 cc Pink milky substance per rectum. BS active t/o.

## 2018-03-08 NOTE — DISCHARGE SUMMARY
[facility]    Discharge Summary  Hospital Medicine    Date of Admission:  3/4/2018  Date of Discharge:  3/8/2018   Discharging Provider: Jeffry Yeh  Date of Service: 3/8/2018      Primary Care     Anna Naidu  [address] [facility] [address]      Identification and Chief Compaint: Doretha Fernandez is a 43 year old female with PMH metastatic melanoma, depression/anxiety, TIA, migraines and thrombosis disorder who presented 3/4/18 with bloody stools x 2 weeks.    Discharge Diagnoses       Rectal bleeding    Mild major depression (H)    Metastatic malignant melanoma (H)    Diarrhea    Intestinal giardiasis    Bilateral leg cramps      Discharge Disposition   Transferred to [facility] under the care of Dr. Maxi Davey.    Discharge Orders     Reason for your hospital stay   You have been hospitalized for evaluation and treatment of abdominal pain and bloody diarrhea, which we're not yet able to fully explain.  You're being transferred to the [facility] for further evaluation and treatment, under the care of the Oncology team, as well as any specialists Oncology may wish to consult.     Activity - Up ad miriam     Full Code     Advance Diet as Tolerated   Follow this diet upon discharge: Orders Placed This Encounter     Advance Diet as Tolerated: Regular Diet Adult          Discharge Medications   Current Discharge Medication List      CONTINUE these medications which have CHANGED    Details   oxyCODONE IR (ROXICODONE) 5 MG tablet Take 1-2 tablets (5-10 mg) by mouth every 3 hours as needed for other (pain control or improvement in physical function. Hold dose for analgesic side effects.)  Qty: 18 tablet, Refills: 0    Associated Diagnoses: Abdominal pain, unspecified abdominal location         CONTINUE these medications which have NOT CHANGED    Details   metroNIDAZOLE (FLAGYL) 500 MG tablet Take 1 tablet (500 mg) by mouth 2 times daily for  "7 days  Qty: 14 tablet, Refills: 0    Comments: Eat yogurt or cottage cheese daily to prevent diarrhea caused by taking this antibiotic      Acetaminophen (TYLENOL PO) Take 1,000 mg by mouth every 8 hours as needed for mild pain or fever      venlafaxine (EFFEXOR-ER) 150 MG TB24 24 hr tablet Take 1 tablet (150 mg) by mouth daily (with breakfast)  Qty: 30 each, Refills: 0    Associated Diagnoses: Mild major depression (H); Diarrhea      Cholecalciferol (VITAMIN D3) 3000 UNITS TABS Take 3,000 Int'l Units by mouth daily  Qty: 30 tablet, Refills: 3    Associated Diagnoses: Vitamin D deficiency      !! order for DME Equipment being ordered: x2 Wearease compression shirt  Qty: 1 each, Refills: 0    Associated Diagnoses: Secondary lymphedema      !! order for DME Equipment being ordered: 20-30mmHg compression sleeve and 20-30mmHg compression glove\" x2 pair  Qty: 1 Units, Refills: 0    Associated Diagnoses: Lymphedema of left arm      rOPINIRole (REQUIP) 0.25 MG tablet Take 1 tablet (0.25 mg) by mouth At Bedtime  Qty: 30 tablet, Refills: 11    Comments: File until needed  Associated Diagnoses: Restless leg syndrome       !! - Potential duplicate medications found. Please discuss with provider.      STOP taking these medications       predniSONE (DELTASONE) 10 MG tablet Comments:   Reason for Stopping:         aspirin 81 MG EC tablet Comments:   Reason for Stopping:         zonisamide (ZONEGRAN) 25 MG capsule Comments:   Reason for Stopping:             Allergies   Allergies   Allergen Reactions     Compazine [Prochlorperazine] Fatigue     Fentanyl Other (See Comments)     sweating     Erythrocin Nausea and Vomiting     Zithromax [Azithromycin Dihydrate] Diarrhea       Consultations This Hospital Stay   Consultation during this admission received from:    CARE TRANSITION RN/SW IP CONSULT  PHYSICAL THERAPY ADULT IP CONSULT    Significant Results and Procedures   Procedures    None.    Data   Results for orders placed or " performed during the hospital encounter of 03/04/18   CT Abdomen Pelvis w Contrast    Narrative    CT ABDOMEN AND PELVIS WITH CONTRAST 3/4/2018 12:16 PM     HISTORY: Bloody diarrhea/abdominal pain, history of melanoma, axilla  surgery in October 2017.       COMPARISON: PET/CT 10/11/2017.    TECHNIQUE: Axial images from the lung bases to the symphysis are  performed with additional coronal reformatted images. 100 mL of Isovue  370 are given intravenously.  Radiation dose for this scan was reduced  using automated exposure control, adjustment of the mA and/or kV  according to patient size, or iterative reconstruction technique.    FINDINGS:   The lung bases are clear.    Abdomen: Tiny cyst measuring less than 1 cm is present in the left  hepatic dome. This was present on a prior contrast-enhanced CT from  2012 and is therefore considered a benign cyst. The liver is otherwise  unremarkable. The spleen, gallbladder, fatty replaced pancreas,  adrenal glands, and kidneys are unremarkable. No hydronephrosis. No  enlarged lymph nodes. The bowel is normal in caliber without  obstruction or diverticulitis. Appendix is normal.    Pelvis: The bladder, uterus, and left ovary are unremarkable. Probable  dominant follicle right ovary is noted. No enlarged pelvic or inguinal  lymph nodes. There is colonic wall thickening and mucosal enhancement  involving the rectum and distal sigmoid colon with surrounding  perirectal adenopathy. In the surrounding mesorectal fat, small pelvic  sidewall lymph nodes are also noted. Findings could reflect an  infectious coloproctitis versus an inflammatory bowel disease such as  ulcerative colitis. Bone window examination is unremarkable.      Impression    IMPRESSION:  1. Probable infectious or inflammatory proctocolitis as described.  Surrounding mesorectal lymph nodes raise the possibility of a more  chronic inflammatory process such as ulcerative colitis. Underlying  mass should likely be  "excluded with follow-up colonoscopy if not  previously performed.  2. Simple cyst left hepatic lobe unchanged dating back to at least  2012. No further follow-up required. Abdominal and pelvic organs are  otherwise within normal limits.    ALEX RIVERA MD   US leg bilateral venous    Narrative    VENOUS ULTRASOUND BOTH LEGS  3/6/2018 8:36 PM     HISTORY: rule out DVT;     COMPARISON: None.    FINDINGS: Examination of the deep veins with graded compression and  color flow Doppler with spectral wave form analysis shows  no evidence  of thrombus in the common femoral vein, femoral vein, popliteal vein  or calf veins.  There is no venous insufficiency.      Impression    IMPRESSION: No DVT is identified in either lower extremity    JD HAYES MD       History of Present Illness   Doretha Fernandez is a 43 year old female who presents with worsening bloody stools over the past 2 weeks.     She states 2 weeks ago after her last infusion she started having diarrhea mixed with blood and pus. This has been ongoing and worsened 5 days ago and is now mostly blood with pus/mucus. She tested positive for giardia on 2/23/2018 and was treated with a dose of tinidazole. Repeat testing was negative. She did not notice much improvement in her symptoms. She is undergoing Opdivo infusions since November, which have been held due to concern that could be a side effect.      She reports that the department of public health called her and notified her that they believed her incubation period was around January 28th, she reports eating out often at that time. No recent travel. No other recent antibiotics.     Reports lower left quadrant abdominal pain which she describes as \"uncomortable\". She is also having worse intermittent left-sided sharp pains which last a few seconds. Worse with oral intake. She is hungry but has not been eating much because this immediately causes her to have a bowel movement. Reports nausea but no emesis.     Reports " "an occipital headache which wraps around to the frontal area. Feels \"dull\". Similar to her past headaches. Tried acetaminophen without much relief.      Reports chills and night sweats which have been intermittent since starting the infusions in November, no change from baseline.     Reports recent rash/redness in her face and chest due to prednisone which was started by her oncologist. Has been resolving since stopping prednisone.      Denies fever, myalgias, lightheadedness, dizziness, sore throat, rhinorrhea, congestion, cough, shortness of breath, chest pain, palpitations, dysuria, changes in urinary frequency/urgency or wounds.    Hospital Course   Doretha Fernandez was admitted on 3/4/2018.  The following problems were addressed during her hospitalization:    Persistent bloody stools with mucus and diarrhea, Intestinal giardiasis  Has now had nearly three weeks of worsening bloody mucus and diarrhea. Underwent stool studies 2/22/18, positive for Giardia antigen.  Was treated with 1 dose of tinidazole for positive Giardia 2/23/2018 (no other antibiotics recently); repeat giardia 2/28/2018 was negative.  However, repeat stool for Giardia antigen 3/3/18, done at this hospital admission, is again positive for Giardia.  Prescribed steroid taper by Dr. 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  CT abdomen/pelvis 3/4/2018 shows colonic wall thickening and mucosal enhancement involving rectum and distal sigmoid consistent with infectious coloprotitis vs inflammatory bowel disease.  Fecal lactoferrin has also been positive on two occasions, though this is a non-specific lab finding.  C diff negative most recently 3/4/18.  Differential diagnosis appears to be primarily persistent Giardiasis vs colitis as a side effect of Opdivo (package information describes " an incidence of 2 to 3%).     Still having hematochezia, reports two or three episodes so far today.  Specimens are being saved in a hat, I saw one yesterday; she is passing a maroon/brown, mucous consistency substance, total amount in the hat after one BM is probably 4 Tbsp.  As of yesterday, Hgb was down only 0.9 g from admission, so it doesn't look like she's losing a lot of blood at this point.  Still having abdominal pain requiring oxycodone and hydromorphone, but has been able to eat a full liquid diet.  She is concerned that she is still having these GI symptoms despite being on MNZ for the past 5 days.  I advised her that rapid resolution of abdominal and GI symptoms may not be a realistic expectation, and reminded her that GI recommends three weeks of MNZ therapy for a reason.  She cried when I mentioned that she might be symptomatic for a week or two longer.  Patient advised me yesterday that she wished to be transferred to the [facility] for ongoing care, as she would be able to see the GI team.  I contacted the GI phone consult physician yesterday afternoon to apprise him of this patient's illness and to discuss possible transfer.  It turns out that the GI physician I talked to was somewhat familiar with her care, as he had performed phone consult in the ED on the day of admission.  We reviewed the current treatment; he recommended that steroids continue to be withheld, and that  mg Q8H continue.  He also recommended that endoscopy with biopsy should be performed now rather than as an outpatient, to see if a tissue diagnosis for colitis etiology can be made.  I suggested transfer to [facility] for endoscopy; GI recommended instead that I consult general surgery here to request endoscopy with biopsy, and GI physician can advise regarding the results.  I had Surgery on-call paged to our office and then my cell phone, but we did not get a response.    I spoke to Dr. Richards, patient's Oncologist, face to  face about this patient on 3/7/18.  He recommended early endoscopy with biopsy, and thought transfer to [facility] would be reasonable.    Patient's condition did not change overnight, and she continued to endorse transfer.  I spoke with the Hospitalist on-call for transfers at [facility] (I have misplaced the name; darby), who suggested we also speak with the Oncology on-call for transfers, Dr. German.  The three of us discussed indications for transfer: lack of a GI inpatient service here, the need for endoscopy without a means of expediting it here, and a complex patient with a highly unusual case.  They concurred with transfer.  Patient is very appreciative.     Plan:                         - Will continue  mg Q8H for a recommended 14 days of treatment, i.e. through 3/17/18, for treatment of Giardia.  Can convert to PO when less nauseated.                         - Prednisone is stopped as above.                         - Anticipate endoscopy with biopsy will be done within the next couple of days.                         - Continue abdominal pain management with oxycodone and/or hydromorphone.                           - Continue diet as tolerated, but will continue IVF until PO intake normalizes.      Headache with history of migraines  Occipital headache that wraps around to frontal. Similar to prior migraines. Reports daily migraines stopped after diagnosis of her melanoma.  Had stopped all migraine medications and had not had headache recurrence until day of admission.  Does not report headache today.  - Watch for headache recurrence.        Metastatic malignant melanoma (H)  Diagnosed on lymph node biopsy in 10/2017. Primary site not identified. Follows with Dr. Richards. Last seen 2/22/2018. On immunotherapy with Opdivo (nivolumab) since November every 2 weeks, which was recently held due to persistent diarrhea. Reports on treatment 8 of planned 26.  Patient very concerned that current GI illness will  "exclude her from future Opdivo treatments.  Dr. Richards is aware of patient's admission, and has stopped by to see her.      Mild major depression  Anxiety  Depression is worsened due to current illness, separation from her kids, concern about her medical future due to melanoma, and she and her ex- are experiencing friction as he is the primary caregiver while she is hospitalized.  Has been tearful for the past few days.  - Continues on home venlafaxine.  Improvement in health status would probably help a lot.      Mild intermittent asthma  Well-controlled. Not on home medications.  Current lung exam is normal.      Prothrombin Gene Mutation  History of stroke  Follows with Dr. Richards. History of stroke/TIA following both pregnancies. Hypercoagulability work up positive for prothrombin gene mutation. No history of DVT/PE.  - Hold home ASA given rectal bleeding.     Bilateral leg cramps  Describes sharp \"cramps\" circumferentially of both legs, from mid-thigh distally.  Not relieved by ambulation.  Is again her chief complaint today.  Exam is unremarkable.  Bilateral venous imaging 3/6/18 was negative for DVT.  Electrolytes, Mg, CPK were normal.  Trial of pramipexole was ineffective.  PT helps a little, but not much.  I don't know the etiology of this.  - Will treat with analgesics for now, and continue PT efforts.    Pending Results   Unresulted Labs Ordered in the Past 30 Days of this Admission     No orders found from 1/3/2018 to 3/5/2018.          Physical Exam   Temp:  [97.9  F (36.6  C)-98.4  F (36.9  C)] 98.4  F (36.9  C)  Pulse:  [58-88] 88  Resp:  [16-18] 16  BP: (108-124)/(57-77) 116/74  SpO2:  [93 %-100 %] 93 %  Vitals:    03/04/18 1029 03/04/18 1632   Weight: 95.3 kg (210 lb) 96.5 kg (212 lb 11.9 oz)       GENERAL: Pleasant woman, lying in bed, appeared to be dozing off but awoke to be alert and conversant.   EYES: Eyes grossly normal to inspection, extraocular movements intact.  HENT: Nares patent " bilaterally.  Nasal mucosa normal, no discharge.    NECK: Trachea midline, no stridor.    RESP: No accessory muscle use.  Symmetrical breath sounds.  Lungs clear anteriorly on inspiration and expiration.  Expiration not prolonged, no wheeze.  CV: Regular rate and rhythm, non-tachycardic.  Normal S1 S2, no murmur or extra sound.  No lower extremity edema.  ABDOMEN: Mildly protuberant, soft, describes tenderness to light palpation over all fields, but no guarding.  Liver and spleen not enlarged, no masses palpable.  Bowel sounds positive.  MS: No bony deformities noted.  No red or inflamed joints.  Unremarkable exam of both legs.  SKIN: Warm and dry, no rashes where skin visible.  NEURO: Alert, oriented, conversant.  Cranial nerves II - XII grossly intact.  No gross motor or sensory deficits.  Gait not assessed.  PSYCH: Alert, conversant.  Able to articulate logical thoughts, no tangential thoughts, no hallucinations or delusions.  Tearful again, describing worries regarding current illness and future health.       The discharge plan was discussed with the patient and with consultants from [facility], whose help is greatly appreciated, see above for details.    Total time on this discharge was 80 minutes, including multiple discussions with [facility] consultants.    Jeffry Yeh

## 2018-03-08 NOTE — PROGRESS NOTES
Brief GI note:     When patient arrives, please start 4 L Golytely prep. Clear liquid for dinner, and keep NPO after midnight.    Patient is schedule for colonoscopy at 10:00 AM tomorrow.     If patient unable to tolerate bowel prep overnight, please give 3x tap water enema tomorrow morning around 7 AM.     Please hold all anticoagulation today.     Check CBC and INR tonight. INR goal < 1.5 and Plt goal > 50 for colonoscopy tomorrow.     GI will see patient tomorrow.     Patient is discussed with Dr. Sandip López  GI fellow  p [phone]

## 2018-03-08 NOTE — IP AVS SNAPSHOT
MRN:[id number]                      After Visit Summary   3/8/2018    Doretha Fernandez    MRN: [id number]           Thank you!     Thank you for choosing [facility] for your care. Our goal is always to provide you with excellent care. Hearing back from our patients is one way we can continue to improve our services. Please take a few minutes to complete the written survey that you may receive in the mail after you visit with us. Thank you!        Patient Information     Date Of Birth          1975        Designated Caregiver       Most Recent Value    Caregiver    Will someone help with your care after discharge? no      About your hospital stay     You were admitted on:  March 8, 2018 You last received care in the:  [address] [facility]    You were discharged on:  March 10, 2018        Reason for your hospital stay       You were admitted for colonoscopy due to persistent loose and bloody stools                  Who to Call     For medical emergencies, please call 911.  For non-urgent questions about your medical care, please call your primary care provider or clinic, [phone]  For questions related to your surgery, please call your surgery clinic        Attending Provider     Provider Specialty    Mark Anthony German,  Internal Medicine-Hematology & Oncology       Primary Care Provider Office Phone # Fax #    Anna Naidu [phone]       When to contact your care team       Call your local cancer clinic ([facility]) during normal business hours or the [facility] 24-hour triage line at [phone] for temperature >100.5, uncontrolled nausea/vomiting/diarrhea/constipation (more than 6 large, loose stools per day), unrelieved pain, bleeding not relieved with pressure, dizziness, chest pain, shortness of breath, loss of consciousness, and any new or concerning symptoms.                  After Care Instructions     Activity       Your activity upon  discharge: Activity as tolerated. No driving or strenuous activity while taking narcotics, if having headaches/dizziness/vision changes, or if feeling generally weak or unwell.            Diet       Follow this diet upon discharge: Regular diet as tolerated. Be sure to drink plenty of non-caffeinated beverages.                  Follow-up Appointments     Follow Up and recommended labs and tests       Appointment with Dr. Richards in clinic next week (3/15) as previously scheduled and listed below. At that appointment you will discuss colonoscopy biopsy results, further management of proctocolitis, and ongoing cancer therapy.                  Your next 10 appointments already scheduled     Mar 15, 2018  8:30 AM CDT   LAB with [facility] ([facility])    [address] [facility] [address]   [phone]           Please do not eat 10-12 hours before your appointment if you are coming in fasting for labs on lipids, cholesterol, or glucose (sugar). This does not apply to pregnant women. Water, hot tea and black coffee (with nothing added) are okay. Do not drink other fluids, diet soda or chew gum.            Mar 15, 2018  9:00 AM CDT   Return Visit with Kathy Richards MD   [facility] ([facility])    [facility]  [address]  [facility] [address]   [phone]            Mar 15, 2018  9:30 AM CDT   Level 2 with ROOM 1 [facility] ([facility])    [facility]  [address]  [facility] [address]   [phone]            Mar 29, 2018  8:30 AM CDT   Level 2 with ROOM 3 [facility] ([facility])    [facility]  [address]  [facility] [address]   [phone]            Apr 09, 2018  2:30 PM CDT   Return Visit with Lowell Bullock MD   [facility])    [address]  [facility] [address]   [phone]              Pending Results     Date and Time Order Name Status Description    3/9/2018 1205 Surgical pathology exam In process             Statement of Approval     Ordered          03/10/18 1036  I have reviewed and agree with all the recommendations and orders detailed in this document.  EFFECTIVE NOW     Approved and electronically signed by:  Cyndie Bell APRN CNP             Admission Information     Date & Time Provider Department Dept. Phone    3/8/2018 Mark Anthony German, DO [address] [facility] [phone]      Your Vitals Were     Blood Pressure Pulse Temperature Respirations Weight Pulse Oximetry    96/52 96 98.4  F (36.9  C) (Oral) 20 98.3 kg (216 lb 12.8 oz) 93%    BMI (Body Mass Index)                   38.4 kg/m2           MyChart Information     [employer] gives you secure access to your electronic health record. If you see a primary care provider, you can also send messages to your care team and make appointments. If you have questions, please call your primary care clinic.  If you do not have a primary care provider, please call [phone] and they will assist you.        Care EveryWhere ID     This is your Care EveryWhere ID. This could be used by other organizations to access your [facility] medical records  [id number]        Equal Access to Services     BRIGETTE QUARLES : Nicol Gustafson, waaxda luqadaha, qaybta kaalmada chris, pancho castillo. So [facility] [phone].    ATENCIÓN: Si habla español, tiene a mcdonnell disposición servicios gratuitos de asistencia lingüística. Llame al [phone].    We comply with applicable federal civil rights laws and [place] laws. We do not discriminate on the basis of race, color, national origin, age, disability, sex, sexual orientation, or gender identity.               Review of your medicines      START taking        Dose /  "Directions    HYDROmorphone 4 MG tablet   Commonly known as:  DILAUDID   Used for:  Proctocolitis        Dose:  4-6 mg   Take 1-1.5 tablets (4-6 mg) by mouth every 3 hours as needed for moderate to severe pain   Quantity:  170 tablet   Refills:  0       LORazepam 0.5 MG tablet   Commonly known as:  ATIVAN   Used for:  Proctocolitis, Anxiety state, Metastatic malignant melanoma (H)        Dose:  0.5-1 mg   Take 1-2 tablets (0.5-1 mg) by mouth every 6 hours as needed for anxiety or other (nausea)   Quantity:  40 tablet   Refills:  0       metroNIDAZOLE 500 MG tablet   Commonly known as:  FLAGYL   Indication:  giardiasis   Used for:  Intestinal giardiasis        Dose:  500 mg   Take 1 tablet (500 mg) by mouth every 8 hours for 8 days   Quantity:  24 tablet   Refills:  0       ondansetron 8 MG ODT tab   Commonly known as:  ZOFRAN-ODT   Used for:  Proctocolitis, Metastatic malignant melanoma (H)        Dose:  8 mg   Take 1 tablet (8 mg) by mouth every 8 hours as needed for nausea or vomiting   Quantity:  60 tablet   Refills:  0       predniSONE 50 MG tablet   Commonly known as:  DELTASONE   Used for:  Proctocolitis        Take 100mg PO qAM with breakfast x 5 days starting on Sun 3/11, then decrease to 50mg PO qAM. Then taper as instructed by your oncologist.   Quantity:  17 tablet   Refills:  2         CONTINUE these medicines which have NOT CHANGED        Dose / Directions    * order for DME   Used for:  Lymphedema of left arm        Equipment being ordered: 20-30mmHg compression sleeve and 20-30mmHg compression glove\" x2 pair   Quantity:  1 Units   Refills:  0       * order for DME   Used for:  Secondary lymphedema        Equipment being ordered: x2 Wearease compression shirt   Quantity:  1 each   Refills:  0       rOPINIRole 0.25 MG tablet   Commonly known as:  REQUIP   Used for:  Restless leg syndrome        Dose:  0.25 mg   Take 1 tablet (0.25 mg) by mouth At Bedtime   Quantity:  30 tablet   Refills:  11       " TYLENOL PO        Dose:  1000 mg   Take 1,000 mg by mouth every 8 hours as needed for mild pain or fever   Refills:  0       venlafaxine 150 MG Tb24 24 hr tablet   Commonly known as:  EFFEXOR-ER   Used for:  Mild major depression (H), Diarrhea        Dose:  150 mg   Take 1 tablet (150 mg) by mouth daily (with breakfast)   Quantity:  30 each   Refills:  0       Vitamin D3 3000 UNITS Tabs   Used for:  Vitamin D deficiency        Dose:  3000 Int'l Units   Take 3,000 Int'l Units by mouth daily   Quantity:  30 tablet   Refills:  3       * Notice:  This list has 2 medication(s) that are the same as other medications prescribed for you. Read the directions carefully, and ask your doctor or other care provider to review them with you.      STOP taking     metroNIDAZOLE 5 mg/mL infusion   Commonly known as:  FLAGYL           oxyCODONE IR 5 MG tablet   Commonly known as:  ROXICODONE                Where to get your medicines      These medications were sent to [facility] [place], [place] - [address] [address]     Phone:  [phone]     metroNIDAZOLE 500 MG tablet    ondansetron 8 MG ODT tab    predniSONE 50 MG tablet         Some of these will need a paper prescription and others can be bought over the counter. Ask your nurse if you have questions.     Bring a paper prescription for each of these medications     HYDROmorphone 4 MG tablet    LORazepam 0.5 MG tablet                Protect others around you: Learn how to safely use, store and throw away your medicines at www.disposemymeds.org.        ANTIBIOTIC INSTRUCTION     You've Been Prescribed an Antibiotic - Now What?  Your healthcare team thinks that you or your loved one might have an infection. Some infections can be treated with antibiotics, which are powerful, life-saving drugs. Like all medications, antibiotics have side effects and should only be used when necessary. There are some important things you  should know about your antibiotic treatment.      Your healthcare team may run tests before you start taking an antibiotic.    Your team may take samples (e.g., from your blood, urine or other areas) to run tests to look for bacteria. These test can be important to determine if you need an antibiotic at all and, if you do, which antibiotic will work best.      Within a few days, your healthcare team might change or even stop your antibiotic.    Your team may start you on an antibiotic while they are working to find out what is making you sick.    Your team might change your antibiotic because test results show that a different antibiotic would be better to treat your infection.    In some cases, once your team has more information, they learn that you do not need an antibiotic at all. They may find out that you don't have an infection, or that the antibiotic you're taking won't work against your infection. For example, an infection caused by a virus can't be treated with antibiotics. Staying on an antibiotic when you don't need it is more likely to be harmful than helpful.      You may experience side effects from your antibiotic.    Like all medications, antibiotics have side effects. Some of these can be serious.    Let you healthcare team know if you have any known allergies when you are admitted to the hospital.    One significant side effect of nearly all antibiotics is the risk of severe and sometimes deadly diarrhea caused by Clostridium difficile (C. Difficile). This occurs when a person takes antibiotics because some good germs are destroyed. Antibiotic use allows C. diificile to take over, putting patients at high risk for this serious infection.    As a patient or caregiver, it is important to understand your or your loved one's antibiotic treatment. It is especially important for caregivers to speak up when patients can't speak for themselves. Here are some important questions to ask your healthcare  team.    What infection is this antibiotic treating and how do you know I have that infection?    What side effects might occur from this antibiotic?    How long will I need to take this antibiotic?    Is it safe to take this antibiotic with other medications or supplements (e.g., vitamins) that I am taking?     Are there any special directions I need to know about taking this antibiotic? For example, should I take it with food?    How will I be monitored to know whether my infection is responding to the antibiotic?    What tests may help to make sure the right antibiotic is prescribed for me?      Information provided by:  www.cdc.gov/getsmart  U.S. Department of Health and Human Services  Centers for disease Control and Prevention  National Center for Emerging and Zoonotic Infectious Diseases  Division of Healthcare Quality Promotion        Information about OPIOIDS     PRESCRIPTION OPIOIDS: WHAT YOU NEED TO KNOW    Prescription opioids can be used to help relieve moderate to severe pain and are often prescribed following a surgery or injury, or for certain health conditions. These medications can be an important part of treatment but also come with serious risks. It is important to work with your health care provider to make sure you are getting the safest, most effective care.    WHAT ARE THE RISKS AND SIDE EFFECTS OF OPIOID USE?  Prescription opioids carry serious risks of addiction and overdose, especially with prolonged use. An opioid overdose, often marked by slowed breathing can cause sudden death. The use of prescription opioids can have a number of side effects as well, even when taken as directed:      Tolerance - meaning you might need to take more of a medication for the same pain relief    Physical dependence - meaning you have symptoms of withdrawal when a medication is stopped    Increased sensitivity to pain    Constipation    Nausea, vomiting, and dry mouth    Sleepiness and  dizziness    Confusion    Depression    Low levels of testosterone that can result in lower sex drive, energy, and strength    Itching and sweating    RISKS ARE GREATER WITH:    History of drug misuse, substance use disorder, or overdose    Mental health conditions (such as depression or anxiety)    Sleep apnea    Older age (65 years or older)    Pregnancy    Avoid alcohol while taking prescription opioids.   Also, unless specifically advised by your health care provider, medications to avoid include:    Benzodiazepines (such as Xanax or Valium)    Muscle relaxants (such as Soma or Flexeril)    Hypnotics (such as Ambien or Lunesta)    Other prescription opioids    KNOW YOUR OPTIONS:  Talk to your health care provider about ways to manage your pain that do not involve prescription opioids. Some of these options may actually work better and have fewer risks and side effects:    Pain relievers such as acetaminophen, ibuprofen, and naproxen    Some medications that are also used for depression or seizures    Physical therapy and exercise    Cognitive behavioral therapy, a psychological, goal-directed approach, in which patients learn how to modify physical, behavioral, and emotional triggers of pain and stress    IF YOU ARE PRESCRIBED OPIOIDS FOR PAIN:    Never take opioids in greater amounts or more often than prescribed    Follow up with your primary health care provider and work together to create a plan on how to manage your pain.    Talk about ways to help manage your pain that do not involve prescription opioids    Talk about all concerns and side effects    Help prevent misuse and abuse    Never sell or share prescription opioids    Never use another person's prescription opioids    Store prescription opioids in a secure place and out of reach of others (this may include visitors, children, friends, and family)    Visit www.cdc.gov/drugoverdose to learn about risks of opioid abuse and overdose    If you believe  "you may be struggling with addiction, tell your health care provider and ask for guidance or call [facility]'s National Helpline at 5-237-884-HELP    LEARN MORE / www.cdc.gov/drugoverdose/prescribing/guideline.html    Safely dispose of unused prescription opioids: Find your local drug take-back programs and more information about the importance of safe disposal at www.doseofreality.[place].gov             Medication List: This is a list of all your medications and when to take them. Check marks below indicate your daily home schedule. Keep this list as a reference.      Medications           Morning Afternoon Evening Bedtime As Needed    HYDROmorphone 4 MG tablet   Commonly known as:  DILAUDID   Take 1-1.5 tablets (4-6 mg) by mouth every 3 hours as needed for moderate to severe pain                                   LORazepam 0.5 MG tablet   Commonly known as:  ATIVAN   Take 1-2 tablets (0.5-1 mg) by mouth every 6 hours as needed for anxiety or other (nausea)   Last time this was given:  1 mg on 3/10/2018  4:05 AM                                   metroNIDAZOLE 500 MG tablet   Commonly known as:  FLAGYL   Take 1 tablet (500 mg) by mouth every 8 hours for 8 days   Last time this was given:  500 mg on 3/10/2018  4:41 AM   Next Dose Due:  Today 3/10/18 2:00 pm                                   ondansetron 8 MG ODT tab   Commonly known as:  ZOFRAN-ODT   Take 1 tablet (8 mg) by mouth every 8 hours as needed for nausea or vomiting   Last time this was given:  8 mg on 3/10/2018  8:13 AM                                   * order for DME   Equipment being ordered: 20-30mmHg compression sleeve and 20-30mmHg compression glove\" x2 pair                                * order for DME   Equipment being ordered: x2 Wearease compression shirt                                predniSONE 50 MG tablet   Commonly known as:  DELTASONE   Take 100mg PO qAM with breakfast x 5 days starting on Sun 3/11, then decrease to 50mg PO qAM. Then taper as " instructed by your oncologist.   Last time this was given:  100 mg on 3/10/2018  7:56 AM                                rOPINIRole 0.25 MG tablet   Commonly known as:  REQUIP   Take 1 tablet (0.25 mg) by mouth At Bedtime   Last time this was given:  0.25 mg on 3/9/2018 10:01 PM                                   TYLENOL PO   Take 1,000 mg by mouth every 8 hours as needed for mild pain or fever                                   venlafaxine 150 MG Tb24 24 hr tablet   Commonly known as:  EFFEXOR-ER   Take 1 tablet (150 mg) by mouth daily (with breakfast)   Next Dose Due:  Tomorrow 3/11/18 8am                                   Vitamin D3 3000 UNITS Tabs   Take 3,000 Int'l Units by mouth daily   Last time this was given:  3,000 Units on 3/10/2018  7:56 AM   Next Dose Due:  Tomorrow 3/11/18 8am                                    * Notice:  This list has 2 medication(s) that are the same as other medications prescribed for you. Read the directions carefully, and ask your doctor or other care provider to review them with you.

## 2018-03-08 NOTE — IP AVS SNAPSHOT
[address] [address]    Phone:  [phone]                                       After Visit Summary   3/8/2018    Doretha Fernandez    MRN: [id number]           After Visit Summary Signature Page     I have received my discharge instructions, and my questions have been answered. I have discussed any challenges I see with this plan with the nurse or doctor.    ..........................................................................................................................................  Patient/Patient Representative Signature      ..........................................................................................................................................  Patient Representative Print Name and Relationship to Patient    ..................................................               ................................................  Date                                            Time    ..........................................................................................................................................  Reviewed by Signature/Title    ...................................................              ..............................................  Date                                                            Time

## 2018-03-08 NOTE — H&P
History and Physical  Hematology / Oncology     Date of Admission:  March 8, 2018    Assessment & Plan   Doretha Fernandez is a 43 year old woman with PMH of metastatic melanoma on treatment with nivolumab, depression/anxiety, TIA, migraines, and prothrombin deficiency. She has been admitted at [facility] from 3/4-3/8 for workup of bloody stools. She is transferred to [facility] for ongoing care and endoscopy with biopsies.    # Rectal bleeding.   # Intestinal giardiasis.    Initially presented with diarrhea mixed with blood and pus after her most recent nivolumab infusion. She tested positive for giardia antigen 2/23/2018 and was treated with a single dose of tinidazole. Repeat testing 2/28 was negative. She did not notice much improvement in her sx. There was concern these sx are r/t nivolumab so drug was held and she was given a steroid taper on 3/1, which has since been stopped. On 3/3 she presented to [facility] with 2 week h/o ongoing bloody mucousy liquid without stool. She feels constipated with real stool about a week ago, but continues to mucousy bloody BMs 4-5 times per day, with sometimes a spoonfull to a half cup full of blood.  Repeat giardia antigen test 3/3 was again positive. Other stool infectious work up has been negative twice including enteric pathogen panel, C diff and O&P. CT abd/pelvis 3/4 shows colonic wall thickening and mucosal enhancement involving rectum and distal sigmoid c/w infectious coloproctitis vs inflammatory bowel disease. Fecal lactoferrin was positive x2, though this is a nonspecific lab finding. C.diff testing was negative. Differential diagnosis appears to be persistent giardiasis vs nivolumab colitis. 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  Recommend endoscopy with biopsies d/t ongoing sx.    -Continue metronidazole 500mg IV q8h x 2-3 weeks (2 weeks= through 3/17/18). Will convert to PO.  -PRN antiemetics - compazine and zofran.  -GI consulted. D/w GI, plan  for colonoscopy in am. Rec golytely prep tonight, if unable to complete it, tap water enemas*2 in am. NPO after MN, check INR in am.  -Continue on IVF.   -Oxycodone or dilaudid PRN pain.     # Headache with h/o migraines.  Occipital headache that wraps around to frontal. Similar to prior migraines. Reports daily migraines stopped after diagnosis of her melanoma. Had stopped all migraine medications and had not had headache recurrence until day of admission to [facility]. Monitor.     # Metastatic malignant melanoma.   Diagnosed on LN bx in Oct 2017. Primary site not identified. Followed by Dr. Richards. Has been on treatment with nivolumab since Nov every 2 weeks. Last dose was #7 on 2/15. Nivolumab currently on hold d/t persistent bloody stools. Dr. Richards aware of her admission.     # Depression.  # Anxiety.   Depression has worsened with recent cancer diagnosis, current illness, separation from her kids, etc.   -Continue venlafaxine.   -Consider palliative care SW or  involvement for help with coping.     Pain plan:   # Pain Assessment:   Current Pain Score 3/8/2018 3/8/2018 3/8/2018   Patient currently in pain? sleeping: patient not able to self report sleeping: patient not able to self report yes   Pain score (0-10) - - -   Pain location - - -   Pain descriptors - - -   - Doretha is experiencing pain due to proctocolitis. Pain management was discussed and the plan was created in a collaborative fashion.  Doretha's response to the current recommendations: engaged  - Please see the plan for pain management as documented above    # Prothrombin gene mutation.  # H/o TIA/stroke.   Follows with Dr. Richards. H/o stroke/TIA following both pregnancies. Hypercoagulability w/u positive for prothrombin gene mutation. No h/o DVT/PE.  -HOLD home ASA given rectal bleeding.     FEN: LR at 100ml/hr, PRN lyte replacement, RDAT   Lines: peripheral IV  Prophylaxis: mechanical, may resume aspirin when able for stroke  prophylaxis  Consults: GI  Code status: FULL  Disposition: Admit to hem/onc service      Code Status   Full Code    Primary Care Physician   Primary hematologist/oncologist: Dr. Richards    Chief Complaint   Abdominal pain, mucous and bloody rectal discharge.     History of Present Illness   History obtained from chart review and discussed with patient.    Doretha Fernandez is a 43 year old woman with PMH of metastatic melanoma on treatment with nivolumab, depression/anxiety, TIA, migraines, and prothrombin deficiency. She has been admitted at [facility] from 3/4-3/8 for workup of bloody stools. She is transferred to [facility] for ongoing care and endoscopy with biopsies.    In mid February, Doretha developed diarrhea mixed with blood and pus after her most recent nivolumab infusion on 2/15. She tested positive for giardia antigen 2/23 and was treated with a single dose of tinidazole. Repeat testing 2/28 was negative. She did not notice much improvement in her sx. There was concern these sx were r/t nivolumab so next dose was held and she was given a steroid taper on 3/1, which has since been stopped per Gi recs. On 3/3 she presented to [facility] with 2 week h/o ongoing bloody stools. Repeat giardia antigen test 3/3 was again positive. CT abd/pelvis 3/4 shows colonic wall thickening and mucosal enhancement involving rectum and distal sigmoid c/w infectious coloproctitis vs inflammatory bowel disease. Fecal lactoferrin was positive x2, though this is a nonspecific lab finding. C.diff testing was negative. Differential diagnosis appears to be persistent giardiasis vs nivolumab colitis. Hospitalist at [facility] conferred with patient's oncologist and [facility] GI consult MD, who recommend endoscopy with biopsies d/t ongoing sx.     She reports that her diarrhea is not associated with stools for past 1 week. She is having only mucus and bloody discharge 4-5 times per day. With pain in hypogastric and LLQ, radiating to both lower  extremities. She denies any weakness or numbness in lower extremities. Able to ambulate without assistance. Tolerating diet well, nausea, but no emesis.   The rest of complete ROS negative other than as described.  Past Medical History    I have reviewed this patient's medical history and updated it with pertinent information if needed.   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      Anxiety      Depression      Lumbago     left lower back pain     Malignant melanoma (H)      Mild persistent asthma      Prothrombin deficiency (H)     takes 81mg asa daily     Stroke (cerebrum) (H)     During      TIA (transient ischemic attack)        Past Surgical History   I have reviewed this patient's surgical history and updated it with pertinent information if needed.  Past Surgical History:   Procedure Laterality Date     COLONOSCOPY N/A 10/18/2017    Procedure: COLONOSCOPY;  Colon;  Surgeon: Debbie Stephens MD;  Location: [facility] OR     DISSECT LYMPH NODE AXILLA Left 10/23/2017    Procedure: DISSECT LYMPH NODE AXILLA;  Left Axillary Lymph Node Dissection ;  Surgeon: Laurent Cool MD;  Location: [facility] OR     GYN SURGERY           REPAIR MOHS Left 2017    Procedure: REPAIR MOHS;  Left Upper Lid Moh's Reconstruction;  Surgeon: Kisha Bosch MD;  Location: [facility] OR       Prior to Admission Medications   Prescriptions Prior to Admission   Medication Sig Dispense Refill Last Dose     Acetaminophen (TYLENOL PO) Take 1,000 mg by mouth every 8 hours as needed for mild pain or fever   Past Week at Unknown time     venlafaxine (EFFEXOR-ER) 150 MG TB24 24 hr tablet Take 1 tablet (150 mg) by mouth daily (with breakfast) 30 each 0 3/4/2018 at am     Cholecalciferol (VITAMIN D3) 3000 UNITS TABS Take 3,000 Int'l Units by mouth daily 30 tablet 3 3/4/2018 at Unknown time     [DISCONTINUED] metroNIDAZOLE (FLAGYL) 500 MG tablet Take 1 tablet (500  "mg) by mouth 2 times daily for 7 days 14 tablet 0 3/4/2018 at am     [DISCONTINUED] predniSONE (DELTASONE) 10 MG tablet Take 60 mg by mouth daily x 4 days then decrease by 10 mg every 4 days until gone. 84 tablet 0 3/4/2018 at am     [DISCONTINUED] aspirin 81 MG EC tablet Take 1 tablet (81 mg) by mouth daily 90 tablet 3 3/4/2018 at Unknown time     order for DME Equipment being ordered: x2 Wearease compression shirt 1 each 0 3/3/2018 at Unknown time     order for DME Equipment being ordered: 20-30mmHg compression sleeve and 20-30mmHg compression glove\" x2 pair 1 Units 0 3/3/2018 at Unknown time     rOPINIRole (REQUIP) 0.25 MG tablet Take 1 tablet (0.25 mg) by mouth At Bedtime 30 tablet 11 Taking     [DISCONTINUED] oxyCODONE IR (ROXICODONE) 5 MG tablet Take 1-3 tablets (5-15 mg) by mouth every 4 hours as needed for pain maximum 12 tablet(s) per day 40 tablet 0 Past Month at Unknown time     [DISCONTINUED] zonisamide (ZONEGRAN) 25 MG capsule Take 1 tablet (25 mg) once daily for 1 week, then 2 tablets once daily for 1 week, then 3 tablets once daily for 1 week, then 4 tablets once daily for 1 week. 90 capsule 1 Taking     Allergies   Allergies   Allergen Reactions     Compazine [Prochlorperazine] Fatigue     Fentanyl Other (See Comments)     sweating     Erythrocin Nausea and Vomiting     Zithromax [Azithromycin Dihydrate] Diarrhea       Social History   I have reviewed this patient's social history and updated it with pertinent information if needed. Doretha Haddadinen  reports that she is a non-smoker but has been exposed to tobacco smoke. She has never used smokeless tobacco. She reports that she does not drink alcohol or use illicit drugs.    Family History   I have reviewed this patient's family history and updated it with pertinent information if needed.   Family History   Problem Relation Age of Onset     CANCER Mother 45     lung     Neurologic Disorder Mother      Lipids Father      GASTROINTESTINAL DISEASE Father "      Depression Father      CANCER Maternal Grandmother      Blood Disease Maternal Grandmother      Arthritis Maternal Grandmother      DIABETES Maternal Grandmother      Depression Maternal Grandmother      Macular Degeneration Maternal Grandmother      Glaucoma Maternal Grandmother      DIABETES Maternal Grandfather      CEREBROVASCULAR DISEASE Maternal Grandfather      Blood Disease Maternal Grandfather      HEART DISEASE Maternal Grandfather      Glaucoma Maternal Grandfather      CANCER Paternal Grandmother      Cancer - colorectal Paternal Grandmother      Respiratory Paternal Grandfather      Blood Disease Paternal Grandfather      HEART DISEASE Daughter      Asthma Daughter      Depression Sister        Review of Systems   Negative other than as stated above in HPI.     Physical Exam                 BP (!) 129/96 (BP Location: Right arm)  Pulse 96  Temp 98.5  F (36.9  C) (Oral)  Resp 20  Wt 97.3 kg (214 lb 9.6 oz)  SpO2 96%  BMI 38.01 kg/m2    Constitutional: Pleasant in mild to moderate pain related distress. Alert and interactive.   HEENT: NCAT. PERRL, EOMI, anicteric sclera. Oral mucosa pink and moist with no lesions or thrush.  Hematologic / Lymphatic: No overt bleeding. No cervical or clavicular adenopathy.  Respiratory: Non-labored breathing, good air exchange, lungs clear to auscultation bilaterally. No cough or wheeze noted.   Cardiovascular: Regular rate and rhythm. No murmur or rub.   GI: Normoactive bowel sounds. Abdomen soft, non-distended, mild to moderate LLQ and hypogastric tenderness without guarding. No palpable masses or organomegaly.  Genitourinary: Deferred.   Skin: Warm and dry. No concerning lesions or rash on exposed surfaces.  Musculoskeletal: Extremities grossly normal, non-tender, no edema. Strong peripheral pulses. Good strength and ROM in bed.   Neurologic: A&O, speech normal, no focal deficits. CNs 2-12 grossly intact, gait normal, sensation to light touch grossly WNL.  Grossly non-focal.  Neuropsychiatric: Mentation and affect appear normal/appropriate.  Vascular Access: PIV    Data   CBC  Recent Labs  Lab 03/07/18  1229 03/06/18  0545 03/05/18  0925 03/04/18  1130   WBC 6.7 8.4 8.4 11.2*   RBC 4.00 4.06 4.15 4.28   HGB 11.1* 11.3* 11.4* 12.0   HCT 34.5* 34.8* 35.6 36.0   MCV 86 86 86 84   MCH 27.8 27.8 27.5 28.0   MCHC 32.2 32.5 32.0 33.3   RDW 13.4 13.2 13.4 13.8    336 329 364     CMP  Recent Labs  Lab 03/07/18  1229 03/06/18  0545 03/04/18  1130 03/03/18  0813 03/01/18  1450    138 139 142 136   POTASSIUM 3.7 3.6 3.8 3.4 4.3   CHLORIDE 106 104 106 109 107   CO2 30 29 28 26 21   ANIONGAP 2* 5 5 7 8   * 91 67* 118* 167*   BUN 5* 8 10 13 10   CR 0.64 0.65 0.62 0.69 0.52   GFRESTIMATED >90 >90 >90 >90 >90   GFRESTBLACK >90 >90 >90 >90 >90   PATRICIA 8.6 7.9* 8.3* 8.5 8.7   MAG 2.0  --   --   --   --    PROTTOTAL  --   --  7.0 7.3 7.7   ALBUMIN  --   --  3.3* 3.5 3.2*   BILITOTAL  --   --  0.1* 0.1* 0.3   ALKPHOS  --   --  65 63 75   AST  --   --  39 17 31   ALT  --   --  60* 32 43     INRNo lab results found in last 7 days.    Results for orders placed or performed during the hospital encounter of 03/04/18   CT Abdomen Pelvis w Contrast    Narrative    CT ABDOMEN AND PELVIS WITH CONTRAST 3/4/2018 12:16 PM     HISTORY: Bloody diarrhea/abdominal pain, history of melanoma, axilla  surgery in October 2017.       COMPARISON: PET/CT 10/11/2017.    TECHNIQUE: Axial images from the lung bases to the symphysis are  performed with additional coronal reformatted images. 100 mL of Isovue  370 are given intravenously.  Radiation dose for this scan was reduced  using automated exposure control, adjustment of the mA and/or kV  according to patient size, or iterative reconstruction technique.    FINDINGS:   The lung bases are clear.    Abdomen: Tiny cyst measuring less than 1 cm is present in the left  hepatic dome. This was present on a prior contrast-enhanced CT from  2012 and is  therefore considered a benign cyst. The liver is otherwise  unremarkable. The spleen, gallbladder, fatty replaced pancreas,  adrenal glands, and kidneys are unremarkable. No hydronephrosis. No  enlarged lymph nodes. The bowel is normal in caliber without  obstruction or diverticulitis. Appendix is normal.    Pelvis: The bladder, uterus, and left ovary are unremarkable. Probable  dominant follicle right ovary is noted. No enlarged pelvic or inguinal  lymph nodes. There is colonic wall thickening and mucosal enhancement  involving the rectum and distal sigmoid colon with surrounding  perirectal adenopathy. In the surrounding mesorectal fat, small pelvic  sidewall lymph nodes are also noted. Findings could reflect an  infectious coloproctitis versus an inflammatory bowel disease such as  ulcerative colitis. Bone window examination is unremarkable.      Impression    IMPRESSION:  1. Probable infectious or inflammatory proctocolitis as described.  Surrounding mesorectal lymph nodes raise the possibility of a more  chronic inflammatory process such as ulcerative colitis. Underlying  mass should likely be excluded with follow-up colonoscopy if not  previously performed.  2. Simple cyst left hepatic lobe unchanged dating back to at least  2012. No further follow-up required. Abdominal and pelvic organs are  otherwise within normal limits.    ALEX RIVERA MD   US leg bilateral venous    Narrative    VENOUS ULTRASOUND BOTH LEGS  3/6/2018 8:36 PM     HISTORY: rule out DVT;     COMPARISON: None.    FINDINGS: Examination of the deep veins with graded compression and  color flow Doppler with spectral wave form analysis shows  no evidence  of thrombus in the common femoral vein, femoral vein, popliteal vein  or calf veins.  There is no venous insufficiency.      Impression    IMPRESSION: No DVT is identified in either lower extremity    JD HAYES MD

## 2018-03-08 NOTE — PLAN OF CARE
Problem: Patient Care Overview  Goal: Plan of Care/Patient Progress Review  Outcome: Improving  Pt slept through the morning after receiving ativan during the night.  When she woke about lunch time she reports ongoing abdominal pain and bilateral leg pain.  Medicated with oxycodone 10mg and tylenol.  Tolerating a regular diet, no nausea.  Did have one small loose/pink stool today.  Planning for transfer to [facility] when bed available.

## 2018-03-08 NOTE — PROGRESS NOTES
[facility] TRANSPORT NOTE  Data:   Reason for Transport:  Transfer to [facility]    Doretha Fernandez was transported to [address] via EMS at 1816.  Patient was accompanied by [facility]. Equipment used for transport: None. Family was aware of reason for transport: yes    Action:  Report: given to TEJINDER Weeks    Response:  Patient's condition when transferred off unit was stable.    Tracy Martinez RN

## 2018-03-09 LAB
ANION GAP SERPL CALCULATED.3IONS-SCNC: 6 MMOL/L (ref 3–14)
BASOPHILS # BLD AUTO: 0 10E9/L (ref 0–0.2)
BASOPHILS NFR BLD AUTO: 0.2 %
BUN SERPL-MCNC: 7 MG/DL (ref 7–30)
CALCIUM SERPL-MCNC: 8.6 MG/DL (ref 8.5–10.1)
CHLORIDE SERPL-SCNC: 103 MMOL/L (ref 94–109)
CO2 SERPL-SCNC: 29 MMOL/L (ref 20–32)
COLONOSCOPY: NORMAL
CREAT SERPL-MCNC: 0.81 MG/DL (ref 0.52–1.04)
DIFFERENTIAL METHOD BLD: ABNORMAL
EOSINOPHIL # BLD AUTO: 0.2 10E9/L (ref 0–0.7)
EOSINOPHIL NFR BLD AUTO: 2.6 %
ERYTHROCYTE [DISTWIDTH] IN BLOOD BY AUTOMATED COUNT: 14.3 % (ref 10–15)
GFR SERPL CREATININE-BSD FRML MDRD: 78 ML/MIN/1.7M2
GLUCOSE SERPL-MCNC: 101 MG/DL (ref 70–99)
HCT VFR BLD AUTO: 34 % (ref 35–47)
HGB BLD-MCNC: 10.7 G/DL (ref 11.7–15.7)
IMM GRANULOCYTES # BLD: 0 10E9/L (ref 0–0.4)
IMM GRANULOCYTES NFR BLD: 0.5 %
INR PPP: 0.99 (ref 0.86–1.14)
LYMPHOCYTES # BLD AUTO: 2.1 10E9/L (ref 0.8–5.3)
LYMPHOCYTES NFR BLD AUTO: 25.1 %
MCH RBC QN AUTO: 27.4 PG (ref 26.5–33)
MCHC RBC AUTO-ENTMCNC: 31.5 G/DL (ref 31.5–36.5)
MCV RBC AUTO: 87 FL (ref 78–100)
MONOCYTES # BLD AUTO: 0.9 10E9/L (ref 0–1.3)
MONOCYTES NFR BLD AUTO: 10.5 %
NEUTROPHILS # BLD AUTO: 5.2 10E9/L (ref 1.6–8.3)
NEUTROPHILS NFR BLD AUTO: 61.1 %
NRBC # BLD AUTO: 0 10*3/UL
NRBC BLD AUTO-RTO: 0 /100
PLATELET # BLD AUTO: 302 10E9/L (ref 150–450)
POTASSIUM SERPL-SCNC: 3.6 MMOL/L (ref 3.4–5.3)
RBC # BLD AUTO: 3.91 10E12/L (ref 3.8–5.2)
SODIUM SERPL-SCNC: 139 MMOL/L (ref 133–144)
WBC # BLD AUTO: 8.4 10E9/L (ref 4–11)

## 2018-03-09 PROCEDURE — 85610 PROTHROMBIN TIME: CPT | Performed by: INTERNAL MEDICINE

## 2018-03-09 PROCEDURE — 36415 COLL VENOUS BLD VENIPUNCTURE: CPT | Performed by: NURSE PRACTITIONER

## 2018-03-09 PROCEDURE — 88305 TISSUE EXAM BY PATHOLOGIST: CPT | Performed by: INTERNAL MEDICINE

## 2018-03-09 PROCEDURE — 0DBN8ZX EXCISION OF SIGMOID COLON, VIA NATURAL OR ARTIFICIAL OPENING ENDOSCOPIC, DIAGNOSTIC: ICD-10-PCS | Performed by: INTERNAL MEDICINE

## 2018-03-09 PROCEDURE — 25000132 ZZH RX MED GY IP 250 OP 250 PS 637: Performed by: NURSE PRACTITIONER

## 2018-03-09 PROCEDURE — 25000132 ZZH RX MED GY IP 250 OP 250 PS 637: Performed by: INTERNAL MEDICINE

## 2018-03-09 PROCEDURE — 25000128 H RX IP 250 OP 636: Performed by: INTERNAL MEDICINE

## 2018-03-09 PROCEDURE — G0500 MOD SEDAT ENDO SERVICE >5YRS: HCPCS | Performed by: INTERNAL MEDICINE

## 2018-03-09 PROCEDURE — 99233 SBSQ HOSP IP/OBS HIGH 50: CPT | Performed by: INTERNAL MEDICINE

## 2018-03-09 PROCEDURE — 45380 COLONOSCOPY AND BIOPSY: CPT | Performed by: INTERNAL MEDICINE

## 2018-03-09 PROCEDURE — 0DBL8ZX EXCISION OF TRANSVERSE COLON, VIA NATURAL OR ARTIFICIAL OPENING ENDOSCOPIC, DIAGNOSTIC: ICD-10-PCS | Performed by: INTERNAL MEDICINE

## 2018-03-09 PROCEDURE — 0DBM8ZX EXCISION OF DESCENDING COLON, VIA NATURAL OR ARTIFICIAL OPENING ENDOSCOPIC, DIAGNOSTIC: ICD-10-PCS | Performed by: INTERNAL MEDICINE

## 2018-03-09 PROCEDURE — 25000131 ZZH RX MED GY IP 250 OP 636 PS 637: Performed by: NURSE PRACTITIONER

## 2018-03-09 PROCEDURE — 12000008 ZZH R&B INTERMEDIATE UMMC

## 2018-03-09 PROCEDURE — 0DBP8ZX EXCISION OF RECTUM, VIA NATURAL OR ARTIFICIAL OPENING ENDOSCOPIC, DIAGNOSTIC: ICD-10-PCS | Performed by: INTERNAL MEDICINE

## 2018-03-09 PROCEDURE — 80048 BASIC METABOLIC PNL TOTAL CA: CPT | Performed by: NURSE PRACTITIONER

## 2018-03-09 PROCEDURE — 25000128 H RX IP 250 OP 636: Performed by: NURSE PRACTITIONER

## 2018-03-09 PROCEDURE — 85025 COMPLETE CBC W/AUTO DIFF WBC: CPT | Performed by: NURSE PRACTITIONER

## 2018-03-09 PROCEDURE — 36415 COLL VENOUS BLD VENIPUNCTURE: CPT | Performed by: INTERNAL MEDICINE

## 2018-03-09 RX ORDER — MEPERIDINE HYDROCHLORIDE 50 MG/ML
75 INJECTION INTRAMUSCULAR; INTRAVENOUS; SUBCUTANEOUS ONCE
Status: COMPLETED | OUTPATIENT
Start: 2018-03-09 | End: 2018-03-09

## 2018-03-09 RX ORDER — CALCIUM CARBONATE 500 MG/1
500 TABLET, CHEWABLE ORAL DAILY PRN
Status: DISCONTINUED | OUTPATIENT
Start: 2018-03-09 | End: 2018-03-10 | Stop reason: HOSPADM

## 2018-03-09 RX ORDER — PREDNISONE 50 MG/1
100 TABLET ORAL DAILY
Status: DISCONTINUED | OUTPATIENT
Start: 2018-03-09 | End: 2018-03-10 | Stop reason: HOSPADM

## 2018-03-09 RX ORDER — DIPHENHYDRAMINE HYDROCHLORIDE 50 MG/ML
INJECTION INTRAMUSCULAR; INTRAVENOUS PRN
Status: DISCONTINUED | OUTPATIENT
Start: 2018-03-09 | End: 2018-03-09 | Stop reason: HOSPADM

## 2018-03-09 RX ADMIN — METRONIDAZOLE 500 MG: 500 TABLET ORAL at 15:00

## 2018-03-09 RX ADMIN — Medication 1 MG: at 01:16

## 2018-03-09 RX ADMIN — SODIUM CHLORIDE, POTASSIUM CHLORIDE, SODIUM LACTATE AND CALCIUM CHLORIDE: 600; 310; 30; 20 INJECTION, SOLUTION INTRAVENOUS at 20:39

## 2018-03-09 RX ADMIN — LORAZEPAM 0.5 MG: 2 INJECTION INTRAMUSCULAR; INTRAVENOUS at 00:08

## 2018-03-09 RX ADMIN — Medication 0.5 MG: at 00:19

## 2018-03-09 RX ADMIN — VENLAFAXINE HYDROCHLORIDE 150 MG: 150 CAPSULE, EXTENDED RELEASE ORAL at 14:59

## 2018-03-09 RX ADMIN — Medication 1 MG: at 06:11

## 2018-03-09 RX ADMIN — ROPINIROLE HYDROCHLORIDE 0.25 MG: 0.25 TABLET, FILM COATED ORAL at 22:01

## 2018-03-09 RX ADMIN — SODIUM CHLORIDE, POTASSIUM CHLORIDE, SODIUM LACTATE AND CALCIUM CHLORIDE: 600; 310; 30; 20 INJECTION, SOLUTION INTRAVENOUS at 00:55

## 2018-03-09 RX ADMIN — Medication 1 MG: at 02:29

## 2018-03-09 RX ADMIN — ONDANSETRON 8 MG: 2 INJECTION INTRAMUSCULAR; INTRAVENOUS at 05:57

## 2018-03-09 RX ADMIN — CALCIUM CARBONATE (ANTACID) CHEW TAB 500 MG 500 MG: 500 CHEW TAB at 02:54

## 2018-03-09 RX ADMIN — METRONIDAZOLE 500 MG: 500 TABLET ORAL at 05:06

## 2018-03-09 RX ADMIN — LORAZEPAM 1 MG: 0.5 TABLET ORAL at 18:18

## 2018-03-09 RX ADMIN — Medication 1 MG: at 03:44

## 2018-03-09 RX ADMIN — LORAZEPAM 1 MG: 0.5 TABLET ORAL at 22:35

## 2018-03-09 RX ADMIN — ACETAMINOPHEN 1000 MG: 500 TABLET, FILM COATED ORAL at 09:31

## 2018-03-09 RX ADMIN — METRONIDAZOLE 500 MG: 500 TABLET ORAL at 22:01

## 2018-03-09 RX ADMIN — Medication 1 MG: at 05:01

## 2018-03-09 RX ADMIN — PREDNISONE 100 MG: 50 TABLET ORAL at 14:59

## 2018-03-09 NOTE — PROGRESS NOTES
CLINICAL NUTRITION SERVICES - ASSESSMENT NOTE     Nutrition Prescription    RECOMMENDATIONS FOR MDs/PROVIDERS TO ORDER:  Diet adv v nutrition support within 2-3 days    Malnutrition Status:    Unable to determine due to pt unavailable during attempts to visit    Recommendations already ordered by Registered Dietitian (RD):  None at this time    Future/Additional Recommendations:  1. If/when diet advances, encourage patient to consume at least 75% of meals TID or the equivalent with snacks/supplements.  If consuming less than this offer supplements and/or consider ordering calorie counts to assess PO intake adequacy.      2. If unable to advance diet 2/2 GI status, consider need for nutrition support.  If able to feed enterally, recommend place FT and start TF.  If enteral nutrition contraindicated, recommend begin CPN.  A) If needs TF support, recommend goal Peptamen VHP (lower fiber, higher protein/modest kcal) @ goal 55 ml/hr (1320 ml/day) to provide 1320 kcals (21 kcal/kg/day), 123 g PRO (2 g/kg/day), 1109 ml free H2O, 103 g CHO and 5 g Fiber daily.     B) If needs PN, consider either of the following:  -- Custom PN if expect will need longer duration PN to provide 100% estimated nutrition needs.  Start PN with 1680 mL/day with initial dextrose 140 g (GIR 1.4), 95 g AA, and 250 mL 20% IV lipids 5 days/week.  Recommend adv dex by 40 g/day if K+/Mg++ >/= nrml and phos >1.9 and BGs stable to goal dextrose 180 g/day. Goal PN would provide 1349 kcal (21 kcal/kg), 1.5 g/kg PRO, GIR ~2 mg/kg/min, and 26% kcal from fat per dosing weight 63 kg.  -- If expect PN will be shorter duration, recommend goal Kabiven (3-in-1) @ 65 mL/hr (1560 mL/day) which provides 1325 kcal (21 kcal/kg), 52 g protein (0.8 g/kg), 152 g CHO (GIR 1.7), and 46% kcal from fat. *Does not meet estimated protein needs due to PN shortages     REASON FOR ASSESSMENT  Doretha Fernandez is a/an 43 year old female assessed by the dietitian for Admission  "Nutrition Risk Screen for reduced oral intake over the last month    NUTRITION HISTORY  Obtained information from chart, pt unavailable to during attempts to visit today (pt scheduled for colonoscopy today).  Per chart, PMH includes metastatic melanoma on treatment with nivolumab (most recent infusion 2/15).  Admit for workup to blood stools (transferred from [facility] where was admitted 3/4-3/8).  Per provider note on admit pt has been tolerating diet well.    CURRENT NUTRITION ORDERS  Diet: NPO  Intake/Tolerance: Regular diet last night (admit day), NPO since midnight for procedure today    LABS  Labs reviewed    MEDICATIONS  Medications reviewed    ANTHROPOMETRICS  Height: 160 cm (5' 3\")  Most Recent Weight: 98.3 kg (216 lb 11.2 oz)    IBW: 52.3 kg   BMI: Obesity Grade II BMI 35-39.9  Weight History: Wt is stable/trending up over the past 3 months  Wt Readings from Last 10 Encounters:   03/09/18 98.3 kg (216 lb 11.2 oz)   03/04/18 96.5 kg (212 lb 11.9 oz)   03/03/18 96.6 kg (213 lb)   02/22/18 97.7 kg (215 lb 4.8 oz)   02/22/18 95.7 kg (211 lb)   02/15/18 98 kg (216 lb 1.6 oz)   01/18/18 97.7 kg (215 lb 6.4 oz)   12/07/17 95.5 kg (210 lb 9.6 oz)   11/24/17 94.3 kg (207 lb 12.8 oz)   11/22/17 93.9 kg (207 lb)      Dosing Weight: 63 kg (adjusted based on lowest recent wt 96.5 kg on 3/4 and IBW)    ASSESSED NUTRITION NEEDS  Estimated Energy Needs: 1500-8933 kcals/day (20 - 25 kcals/kg)  Justification: Obese, aim higher end given acute illness/recent cancer treatment  Estimated Protein Needs:  grams protein/day (1.5 - 2 grams of pro/kg)  Justification: Hypercatabolism with acute illness and Obesity guidelines  Estimated Fluid Needs: (25-30 mL/kg)   Justification: Maintenance, or per provider pending fluid status    PHYSICAL FINDINGS  See malnutrition section below.    MALNUTRITION  % Intake: Unable to assess  % Weight Loss: None noted  Subcutaneous Fat Loss: Unable to assess  Muscle Loss: Unable to assess  Fluid " Accumulation/Edema: None noted per provider note yesterday  Malnutrition Diagnosis: Unable to determine due to pt unavailable during attempts to visit    NUTRITION DIAGNOSIS  Predicted inadequate nutrient intake (protein-energy) related to potential for decreased appetite/PO intake tolerance 2/2 bloody stools and currently NPO    INTERVENTIONS  Implementation  Nutrition Education: Unable to complete due to pt unavailable     Goals  Diet adv v nutrition support within 2-3 days.     Monitoring/Evaluation  Progress toward goals will be monitored and evaluated per protocol.     Latosha Olivarez RD, LD   6A/7D RD Pager: [phone]

## 2018-03-09 NOTE — OR NURSING
Pt tolerated colonoscopy on 2L NC. Samples sent to lab stat. Report called to floor RN. VSS throughout.

## 2018-03-09 NOTE — PLAN OF CARE
Problem: Patient Care Overview  Goal: Plan of Care/Patient Progress Review  Outcome: No Change  Patient here to evaluate GI symptoms of little stool except pinkish/clot like rectal discharge .patient feels there has been no stool for several days except blood clots.Much nausea and vomiting with go-lytely intake.Did finish all but 1/2 a glass=240 cc approximately at 0500 completed.Much pain reported= Complaining of pain in abdomen and has requested med  every hour.Question need for pca if this continues.Having brown water stool.Needing  O2 on and off probably  D/T narcotics.when up and about no need for additional O2.Patient anxious

## 2018-03-09 NOTE — PROGRESS NOTES
[facility], [place]    Hematology / Oncology Progress Note    Date of Admission: 3/8/2018     Assessment & Plan   Doretha Fernandez is a 43 year old woman with PMH of metastatic melanoma on treatment with nivolumab, depression/anxiety, TIA, migraines, and prothrombin deficiency. She was admitted at [facility] from 3/4-3/8 for workup of bloody stools. She wass transferred to [facility] for ongoing care and colonoscopy with biopsies.     # Rectal bleeding.   # Intestinal giardiasis.    Initially presented with diarrhea mixed with blood and pus after her most recent nivolumab infusion. She tested positive for giardia antigen 2/23/2018 and was treated with a single dose of tinidazole. Repeat testing 2/28 was negative. She did not notice much improvement in her sx. There was concern these sx are r/t nivolumab so drug was held and she was given a steroid taper on 3/1, which has since been stopped. On 3/3 she presented to [facility] with 2 week h/o ongoing bloody mucousy liquid without stool. She feels constipated with real stool about a week ago, but continues to mucousy bloody BMs 4-5 times per day, with sometimes a spoonfull to a half cup full of blood.  Repeat giardia antigen test 3/3 was again positive. Other stool infectious work up has been negative twice including enteric pathogen panel, C diff and O&P. CT abd/pelvis 3/4 shows colonic wall thickening and mucosal enhancement involving rectum and distal sigmoid c/w infectious coloproctitis vs inflammatory bowel disease. Fecal lactoferrin was positive x2, though this is a nonspecific lab finding. C.diff testing was negative. Differential diagnosis appears to be persistent giardiasis vs nivolumab colitis. 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 GI recommended colonoscopy with biopsies d/t ongoing sx.     -Continue metronidazole 500mg PO q8h x 2-3 weeks (2 weeks= through 3/17/18).   -PRN antiemetics - compazine and zofran.  -GI  consulted and did colonoscopy today 3/9. +Inflammation and petechiae noted. Biopsies taken.   -Post-procedure can resume RDAT once more alert.   -Continue on IVF.   -Oxycodone or dilaudid PRN pain.   -Start prednisone 1mg/kg/day for possible nivolumab colitis.      # Headache with h/o migraines.  Occipital headache that wraps around to frontal. Similar to prior migraines. Reports daily migraines stopped after diagnosis of her melanoma. Had stopped all migraine medications and had not had headache recurrence until day of admission to [facility]. Monitor.      # Metastatic malignant melanoma.   Diagnosed on LN bx in Oct 2017. Primary site not identified. Followed by Dr. Richards. Has been on treatment with nivolumab since Nov every 2 weeks. Last dose was #7 on 2/15. Nivolumab currently on hold d/t persistent bloody stools. Dr. Richards aware of her admission.      # Depression.  # Anxiety.   Depression has worsened with recent cancer diagnosis, current illness, separation from her kids, etc.   -Continue venlafaxine.   -Consider palliative care SW or  involvement for help with coping.      Pain plan:   # Pain Assessment:   Current Pain Score 3/8/2018 3/8/2018 3/8/2018   Patient currently in pain? sleeping: patient not able to self report sleeping: patient not able to self report yes   Pain score (0-10) - - -   Pain location - - -   Pain descriptors - - -   - Doretha is experiencing pain due to proctocolitis. Pain management was discussed and the plan was created in a collaborative fashion.  Doretha's response to the current recommendations: engaged  - Please see the plan for pain management as documented above     # Prothrombin gene mutation.  # H/o TIA/stroke.   Follows with Dr. Richards. H/o stroke/TIA following both pregnancies. Hypercoagulability w/u positive for prothrombin gene mutation. No h/o DVT/PE.  -HOLD home ASA given rectal bleeding.      FEN: LR at 100ml/hr, PRN lyte replacement, RDAT   Lines: peripheral  IV  Prophylaxis: mechanical, may resume aspirin when able for stroke prophylaxis  Consults: GI  Code status: FULL  Disposition: Anticipate d/c home in 1-2 days pending clinical improvement    Cyndie Bell DNP, APRN, CNP  Hematology/Oncology  Pager: [phone]    Interval History   Doretha is drowsy post-procedure and states she feels tired but o/w doing OK this afternoon. Endorses generalized abdominal pain/tenderness. Denies fever, nausea, vomiting at this time. Has not had a BM since prior to colonoscopy. No other immediate concerns.     Physical Exam   Temp: 96.6  F (35.9  C) Temp src: Oral BP: 120/78 Pulse: 96 Heart Rate: 70 Resp: 14 SpO2: 96 % O2 Device: None (Room air) Oxygen Delivery: 3 LPM  Vitals:    03/08/18 1914 03/09/18 0749   Weight: 97.3 kg (214 lb 9.6 oz) 98.3 kg (216 lb 11.2 oz)     Vital Signs with Ranges  Temp:  [96.2  F (35.7  C)-98.6  F (37  C)] 96.6  F (35.9  C)  Pulse:  [80-96] 96  Heart Rate:  [] 70  Resp:  [7-20] 14  BP: ()/(41-96) 120/78  SpO2:  [88 %-99 %] 96 %  I/O last 3 completed shifts:  In: 4005 [P.O.:2240; I.V.:1765]  Out: 300 [Emesis/NG output:300]    Constitutional: Pleasant woman seen resting in bed in NAD. Drowsy but wakes to voice and is appropriately interactive.   HEENT: NCAT. PERRL, anicteric sclera. MMM, no lesions or thrush.  Respiratory: Non-labored breathing on 2L O2. Lungs CTAB.   Cardiovascular: Regular rate and rhythm. No murmur or rub.   GI: Hypoactive bowel sounds. Abdomen soft, non-distended, mild to moderate LLQ and hypogastric tenderness without guarding. No palpable masses or organomegaly.  Genitourinary: Deferred.   Skin: Pale, warm and dry. No concerning lesions or rash on exposed surfaces.  Musculoskeletal: Extremities grossly normal, non-tender, no edema.  Neurologic: Drowsy but wakes easily, slightly disoriented at first but orientable, speech normal, no focal deficits.   Vascular Access: PIV.    Medications   Current Facility-Administered  Medications   Medication     calcium carbonate (TUMS) chewable tablet 500 mg     predniSONE (DELTASONE) tablet 100 mg     acetaminophen (TYLENOL) tablet 1,000 mg     cholecalciferol (vitamin D3) tablet 3,000 Units     rOPINIRole (REQUIP) tablet 0.25 mg     ondansetron (ZOFRAN) injection 8 mg    Or     ondansetron (ZOFRAN-ODT) ODT tab 8 mg    Or     ondansetron (ZOFRAN) tablet 8 mg     lactated ringers infusion     venlafaxine (EFFEXOR-XR) 24 hr capsule 150 mg     metroNIDAZOLE (FLAGYL) tablet 500 mg     oxyCODONE IR (ROXICODONE) tablet 5-10 mg     HYDROmorphone (PF) (DILAUDID) injection 0.5-1 mg     naloxone (NARCAN) injection 0.1-0.4 mg     LORazepam (ATIVAN) tablet 0.5-1 mg     Facility-Administered Medications Ordered in Other Encounters   Medication     lidocaine 1 % 9 mL     sodium bicarbonate 8.4 % injection 1 mEq       Data   CBC  Recent Labs  Lab 03/09/18  0715 03/07/18  1229 03/06/18  0545 03/05/18  0925   WBC 8.4 6.7 8.4 8.4   RBC 3.91 4.00 4.06 4.15   HGB 10.7* 11.1* 11.3* 11.4*   HCT 34.0* 34.5* 34.8* 35.6   MCV 87 86 86 86   MCH 27.4 27.8 27.8 27.5   MCHC 31.5 32.2 32.5 32.0   RDW 14.3 13.4 13.2 13.4    321 336 329     CMP  Recent Labs  Lab 03/09/18  0715 03/07/18  1229 03/06/18  0545 03/04/18  1130 03/03/18  0813    138 138 139 142   POTASSIUM 3.6 3.7 3.6 3.8 3.4   CHLORIDE 103 106 104 106 109   CO2 29 30 29 28 26   ANIONGAP 6 2* 5 5 7   * 147* 91 67* 118*   BUN 7 5* 8 10 13   CR 0.81 0.64 0.65 0.62 0.69   GFRESTIMATED 78 >90 >90 >90 >90   GFRESTBLACK >90 >90 >90 >90 >90   PATRICIA 8.6 8.6 7.9* 8.3* 8.5   MAG  --  2.0  --   --   --    PROTTOTAL  --   --   --  7.0 7.3   ALBUMIN  --   --   --  3.3* 3.5   BILITOTAL  --   --   --  0.1* 0.1*   ALKPHOS  --   --   --  65 63   AST  --   --   --  39 17   ALT  --   --   --  60* 32     INR  Recent Labs  Lab 03/09/18  0715   INR 0.99       Results for orders placed or performed during the hospital encounter of 03/04/18   CT Abdomen Pelvis w Contrast     Narrative    CT ABDOMEN AND PELVIS WITH CONTRAST 3/4/2018 12:16 PM     HISTORY: Bloody diarrhea/abdominal pain, history of melanoma, axilla  surgery in October 2017.       COMPARISON: PET/CT 10/11/2017.    TECHNIQUE: Axial images from the lung bases to the symphysis are  performed with additional coronal reformatted images. 100 mL of Isovue  370 are given intravenously.  Radiation dose for this scan was reduced  using automated exposure control, adjustment of the mA and/or kV  according to patient size, or iterative reconstruction technique.    FINDINGS:   The lung bases are clear.    Abdomen: Tiny cyst measuring less than 1 cm is present in the left  hepatic dome. This was present on a prior contrast-enhanced CT from  2012 and is therefore considered a benign cyst. The liver is otherwise  unremarkable. The spleen, gallbladder, fatty replaced pancreas,  adrenal glands, and kidneys are unremarkable. No hydronephrosis. No  enlarged lymph nodes. The bowel is normal in caliber without  obstruction or diverticulitis. Appendix is normal.    Pelvis: The bladder, uterus, and left ovary are unremarkable. Probable  dominant follicle right ovary is noted. No enlarged pelvic or inguinal  lymph nodes. There is colonic wall thickening and mucosal enhancement  involving the rectum and distal sigmoid colon with surrounding  perirectal adenopathy. In the surrounding mesorectal fat, small pelvic  sidewall lymph nodes are also noted. Findings could reflect an  infectious coloproctitis versus an inflammatory bowel disease such as  ulcerative colitis. Bone window examination is unremarkable.      Impression    IMPRESSION:  1. Probable infectious or inflammatory proctocolitis as described.  Surrounding mesorectal lymph nodes raise the possibility of a more  chronic inflammatory process such as ulcerative colitis. Underlying  mass should likely be excluded with follow-up colonoscopy if not  previously performed.  2. Simple cyst left  hepatic lobe unchanged dating back to at least  2012. No further follow-up required. Abdominal and pelvic organs are  otherwise within normal limits.    ALEX RIVERA MD   US leg bilateral venous    Narrative    VENOUS ULTRASOUND BOTH LEGS  3/6/2018 8:36 PM     HISTORY: rule out DVT;     COMPARISON: None.    FINDINGS: Examination of the deep veins with graded compression and  color flow Doppler with spectral wave form analysis shows  no evidence  of thrombus in the common femoral vein, femoral vein, popliteal vein  or calf veins.  There is no venous insufficiency.      Impression    IMPRESSION: No DVT is identified in either lower extremity    JD HAYES MD

## 2018-03-09 NOTE — PLAN OF CARE
Problem: Patient Care Overview  Goal: Plan of Care/Patient Progress Review  AVSS. Pt lethargic this am and after colonoscopy. Becoming more alert this afternoon. Pt tolerated colonoscopy well. Biopsies done. Denies pain. Diet advanced to regular. Currently going to order food. Pt denies nausea. Ambulating with sba. Voiding spont. No bm since colonoscopy. Continue with poc.

## 2018-03-09 NOTE — PLAN OF CARE
Problem: Patient Care Overview  Goal: Plan of Care/Patient Progress Review    Nursing Focus: Admission  D: Arrived at 1915 from [facility] via [facility] transport. Patient arrived solo. Admitted for GI workup in presence of mucous/bloody stools. Stool giardia+. Complains of abd cramping, N/V, leg pain, and anxiety about hospitalization.      I: Admission process began.  Patient oriented to room, enviroment, call light.  MD notified of patient's arrival on unit.     A: Vital signs stable, afebrile.  Patient stable at this time. Started GoLytely for colonoscopy tomorrow. Pt received IV Zofran 8mg x 1 for N/V. Extremely anxious that the GoLytely was making her nauseous and she still has not had a stool. Pt reported blood output w/o stool x2. Ativan PO 0.5mg given x 1 for anxiety. PIV placed in R arm. Pt cannot have labs/PIV placed in L arm. C/O abd cramping and leg pain. Declined pain meds because she did not want to throw them up. Clear liquid diet and NPO at midnight.    P: Implement plan of care when available. Continue to monitor patient. Nursing interventions as appropriate. Notify MD with changes in pt status.

## 2018-03-10 VITALS
DIASTOLIC BLOOD PRESSURE: 52 MMHG | WEIGHT: 216.8 LBS | HEART RATE: 96 BPM | OXYGEN SATURATION: 93 % | RESPIRATION RATE: 20 BRPM | TEMPERATURE: 98.4 F | BODY MASS INDEX: 38.4 KG/M2 | SYSTOLIC BLOOD PRESSURE: 96 MMHG

## 2018-03-10 LAB
ANION GAP SERPL CALCULATED.3IONS-SCNC: 8 MMOL/L (ref 3–14)
BASOPHILS # BLD AUTO: 0 10E9/L (ref 0–0.2)
BASOPHILS NFR BLD AUTO: 0.2 %
BUN SERPL-MCNC: 9 MG/DL (ref 7–30)
CALCIUM SERPL-MCNC: 8.7 MG/DL (ref 8.5–10.1)
CHLORIDE SERPL-SCNC: 106 MMOL/L (ref 94–109)
CO2 SERPL-SCNC: 26 MMOL/L (ref 20–32)
COPATH REPORT: NORMAL
CREAT SERPL-MCNC: 0.64 MG/DL (ref 0.52–1.04)
DIFFERENTIAL METHOD BLD: ABNORMAL
EOSINOPHIL # BLD AUTO: 0 10E9/L (ref 0–0.7)
EOSINOPHIL NFR BLD AUTO: 0 %
ERYTHROCYTE [DISTWIDTH] IN BLOOD BY AUTOMATED COUNT: 14.2 % (ref 10–15)
GFR SERPL CREATININE-BSD FRML MDRD: >90 ML/MIN/1.7M2
GLUCOSE SERPL-MCNC: 132 MG/DL (ref 70–99)
HCT VFR BLD AUTO: 33.7 % (ref 35–47)
HGB BLD-MCNC: 10.6 G/DL (ref 11.7–15.7)
IMM GRANULOCYTES # BLD: 0.1 10E9/L (ref 0–0.4)
IMM GRANULOCYTES NFR BLD: 1.1 %
LYMPHOCYTES # BLD AUTO: 1.6 10E9/L (ref 0.8–5.3)
LYMPHOCYTES NFR BLD AUTO: 15.1 %
MCH RBC QN AUTO: 27.4 PG (ref 26.5–33)
MCHC RBC AUTO-ENTMCNC: 31.5 G/DL (ref 31.5–36.5)
MCV RBC AUTO: 87 FL (ref 78–100)
MONOCYTES # BLD AUTO: 0.6 10E9/L (ref 0–1.3)
MONOCYTES NFR BLD AUTO: 5.7 %
NEUTROPHILS # BLD AUTO: 8.1 10E9/L (ref 1.6–8.3)
NEUTROPHILS NFR BLD AUTO: 77.9 %
NRBC # BLD AUTO: 0 10*3/UL
NRBC BLD AUTO-RTO: 0 /100
PLATELET # BLD AUTO: 294 10E9/L (ref 150–450)
POTASSIUM SERPL-SCNC: 3.8 MMOL/L (ref 3.4–5.3)
RBC # BLD AUTO: 3.87 10E12/L (ref 3.8–5.2)
SODIUM SERPL-SCNC: 140 MMOL/L (ref 133–144)
WBC # BLD AUTO: 10.4 10E9/L (ref 4–11)

## 2018-03-10 PROCEDURE — 25000132 ZZH RX MED GY IP 250 OP 250 PS 637: Performed by: INTERNAL MEDICINE

## 2018-03-10 PROCEDURE — 25000131 ZZH RX MED GY IP 250 OP 636 PS 637: Performed by: NURSE PRACTITIONER

## 2018-03-10 PROCEDURE — 80048 BASIC METABOLIC PNL TOTAL CA: CPT | Performed by: NURSE PRACTITIONER

## 2018-03-10 PROCEDURE — 85025 COMPLETE CBC W/AUTO DIFF WBC: CPT | Performed by: NURSE PRACTITIONER

## 2018-03-10 PROCEDURE — 25000132 ZZH RX MED GY IP 250 OP 250 PS 637: Performed by: NURSE PRACTITIONER

## 2018-03-10 PROCEDURE — 25000128 H RX IP 250 OP 636: Performed by: INTERNAL MEDICINE

## 2018-03-10 PROCEDURE — 99238 HOSP IP/OBS DSCHRG MGMT 30/<: CPT | Performed by: INTERNAL MEDICINE

## 2018-03-10 PROCEDURE — 36415 COLL VENOUS BLD VENIPUNCTURE: CPT | Performed by: NURSE PRACTITIONER

## 2018-03-10 PROCEDURE — 25000125 ZZHC RX 250: Performed by: NURSE PRACTITIONER

## 2018-03-10 RX ORDER — METRONIDAZOLE 500 MG/1
500 TABLET ORAL EVERY 8 HOURS
Qty: 24 TABLET | Refills: 0 | Status: SHIPPED | OUTPATIENT
Start: 2018-03-10 | End: 2019-02-18

## 2018-03-10 RX ORDER — LORAZEPAM 0.5 MG/1
.5-1 TABLET ORAL EVERY 6 HOURS PRN
Qty: 40 TABLET | Refills: 0 | Status: SHIPPED | OUTPATIENT
Start: 2018-03-10 | End: 2018-03-29

## 2018-03-10 RX ORDER — HYDROMORPHONE HYDROCHLORIDE 4 MG/1
4-6 TABLET ORAL
Qty: 170 TABLET | Refills: 0 | Status: ON HOLD | OUTPATIENT
Start: 2018-03-10 | End: 2018-05-09

## 2018-03-10 RX ORDER — PREDNISONE 50 MG/1
TABLET ORAL
Qty: 17 TABLET | Refills: 2 | Status: SHIPPED | OUTPATIENT
Start: 2018-03-10 | End: 2018-03-15

## 2018-03-10 RX ORDER — ONDANSETRON 8 MG/1
8 TABLET, ORALLY DISINTEGRATING ORAL EVERY 8 HOURS PRN
Qty: 60 TABLET | Refills: 0 | Status: ON HOLD | OUTPATIENT
Start: 2018-03-10 | End: 2018-05-06

## 2018-03-10 RX ORDER — HYDROMORPHONE HYDROCHLORIDE 4 MG/1
4-6 TABLET ORAL
Status: DISCONTINUED | OUTPATIENT
Start: 2018-03-10 | End: 2018-03-10 | Stop reason: HOSPADM

## 2018-03-10 RX ADMIN — SODIUM CHLORIDE, POTASSIUM CHLORIDE, SODIUM LACTATE AND CALCIUM CHLORIDE: 600; 310; 30; 20 INJECTION, SOLUTION INTRAVENOUS at 07:00

## 2018-03-10 RX ADMIN — Medication 1 MG: at 00:55

## 2018-03-10 RX ADMIN — VENLAFAXINE HYDROCHLORIDE 150 MG: 150 CAPSULE, EXTENDED RELEASE ORAL at 07:56

## 2018-03-10 RX ADMIN — OXYCODONE HYDROCHLORIDE 10 MG: 5 TABLET ORAL at 08:13

## 2018-03-10 RX ADMIN — ACETAMINOPHEN 1000 MG: 500 TABLET, FILM COATED ORAL at 00:08

## 2018-03-10 RX ADMIN — VITAMIN D, TAB 1000IU (100/BT) 3000 UNITS: 25 TAB at 07:56

## 2018-03-10 RX ADMIN — PREDNISONE 100 MG: 50 TABLET ORAL at 07:56

## 2018-03-10 RX ADMIN — LORAZEPAM 1 MG: 0.5 TABLET ORAL at 04:05

## 2018-03-10 RX ADMIN — Medication 1 MG: at 04:41

## 2018-03-10 RX ADMIN — ONDANSETRON 8 MG: 8 TABLET, ORALLY DISINTEGRATING ORAL at 08:13

## 2018-03-10 RX ADMIN — CALCIUM CARBONATE (ANTACID) CHEW TAB 500 MG 500 MG: 500 CHEW TAB at 07:55

## 2018-03-10 RX ADMIN — METRONIDAZOLE 500 MG: 500 TABLET ORAL at 04:41

## 2018-03-10 ASSESSMENT — PAIN DESCRIPTION - DESCRIPTORS
DESCRIPTORS: ACHING;DISCOMFORT;SORE
DESCRIPTORS: ACHING;DISCOMFORT

## 2018-03-10 NOTE — CONSULTS
GASTROENTEROLOGY CONSULTATION      Date of Admission:  3/8/2018          ASSESSMENT AND RECOMMENDATIONS:   Assessment:  44 yo F with h/o metastatic melanoma on Nivolumab, prothrombin deficiency, TIA, depression/anxiety, migraines, transferred from [facility] for ongoing diarrhea and hematochezia for 2 weeks. +ve Giardia 2/22/18, treated with tinidazole 2/23/18. repeat Giardia 2/28/18 negative. but then recurrent Giardia 3/3/18. was given steroids taper by Dr. Ghosh 3/1/18 for Opdivo associated colitis. CT 3/4//18 showed colonic wall thickening and mucosal enhancement involving rectum and distal sigmoid c/w infectious coloprotitis vs. IBD. +ve fecal lactoferrin x2.  Colonoscopy today showed congested, erythematous, granular and vascular-pattern-decreased mucosa in the rectum to transverse colon, mainly in recto-sigmoid area. Biopsied.      Recommendations  - Awaiting for biopsy results  - Continue Flagyl 500 mg PO q8 hours   - Trend Hgb per primary team  - Continue antiemetics per primary team  - Advance diet as tolerates   - Continue steroids per primary team for possible nivolumab colitis     GI will continue to follow, please page if any questions.     Gastroenterology follow up recommendations: pending biopsy results     Thank you for involving us in this patient's care. Please do not hesitate to contact the GI service with any questions or concerns.     Pt care plan discussed with Dr. Schumacher, GI staff physician.    Jasen López  -------------------------------------------------------------------------------------------------------------------          Chief Complaint:   We were asked by Dr. German of Hem/onc to evaluate this patient with ongoing diarrhea and hematochezia     History is obtained from the patient and the medical record.          History of Present Illness:   Doretha Fernandez is a 43 year old female with a history of metastatic melanoma (diagnosed 10/2017) on Nivolumab, prothrombin deficiency, TIA,  depression/anxiety, migraines, transferred from [facility] for ongoing diarrhea and hematochezia for 2 weeks.  Diarrhea started 2 weeks ago, initially had blood mixing with stool, but for the last week, only blood noted, was maroon colored. She reports multiple episodes of watery stool per day. Associates with nausea, NBNB emesis and bloating. She had +ve Giardia 18, treated with tinidazole 18. repeat Giardia 18 negative. but then recurrent Giardia 3/3/18. was given steroids taper by Dr. Ghosh 3/1/18 for Opdivo associated colitis. CT 3/4//18 showed colonic wall thickening and mucosal enhancement involving rectum and distal sigmoid c/w infectious coloprotitis vs. IBD. +ve fecal lactoferrin x2.  Had hematochezia in the past, thought from rectum. No family hx of IBD or CRC. She denied melena, dysphasia, odynophagia, epigastric pain, hematemesis, fever, or chills. Nausea and vomiting improving.              Past Medical History:   Reviewed and edited as appropriate  Past Medical History:   Diagnosis Date     Abnormal MRI     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      Anxiety      Depression      Lumbago     left lower back pain     Malignant melanoma (H)      Mild persistent asthma      Prothrombin deficiency (H)     takes 81mg asa daily     Stroke (cerebrum) (H)     During      TIA (transient ischemic attack)             Past Surgical History:   Reviewed and edited as appropriate   Past Surgical History:   Procedure Laterality Date     COLONOSCOPY N/A 10/18/2017    Procedure: COLONOSCOPY;  Colon;  Surgeon: Debbie Stephens MD;  Location: [facility] OR     DISSECT LYMPH NODE AXILLA Left 10/23/2017    Procedure: DISSECT LYMPH NODE AXILLA;  Left Axillary Lymph Node Dissection ;  Surgeon: Laurent Cool MD;  Location: [facility] OR     GYN SURGERY           REPAIR MOHS Left 2017    Procedure: REPAIR MOHS;  Left Upper Lid Moh's Reconstruction;  Surgeon: Kisha Bosch MD;   Location: [facility] OR            Previous Endoscopy:     Results for orders placed or performed during the hospital encounter of 03/08/18   COLONOSCOPY   Result Value Ref Range    COLONOSCOPY       [facility], [place]  [address] [place] [address] [phone]     Endoscopy Department  _______________________________________________________________________________  Patient Name: Doretha Fernandez            Procedure Date: 3/9/2018 10:30 AM  MRN: [id number]                       Account Number: [id number]  YOB: 1975              Admit Type: Inpatient  Age: 43                                Gender: Female  Note Status: Finalized                Attending MD: Benita Schumacher MD  Pause for the Cause: performed.       Total Sedation Time: 20 minutes of   continuous 1:1 bedside monitoring performed.  _______________________________________________________________________________     Procedure:           Colonoscopy  Indications:         Hematochezia, 42 yo F wtih hematochezia and diarrhea                        with stool + giardia, currently on Opdivo for metastatic                        melanoma. CT showed left sided colitis.  Providers:           Pj Schumacher MD, Renard Cosme RN  Referring MD:          Medicines:           Midazolam 1.5 mg IV, Meperidine 25 mg IV,                        Diphenhydramine 25 mg IV  Complications:       No immediate complications.  _______________________________________________________________________________  Procedure:           Pre-Anesthesia Assessment:                       - Prior to the procedure, a History and Physical was                        performed, and patient medications and allergies were                        reviewed. The patient is competent. The risks and                        benefits of the procedure and the sedation options and                        risks were discussed with the patient. All questions                        were  answered and informed consent was obtained. Patient                        identification and proposed procedure were verified by                        the physician and the nurse in the pre-procedure area in                        the procedure  room. Mental Status Examination: alert and                        oriented. Airway Examination: normal oropharyngeal                        airway and neck mobility. Respiratory Examination: clear                        to auscultation. CV Examination: normal. Prophylactic                        Antibiotics: The patient does not require prophylactic                        antibiotics. Prior Anticoagulants: The patient has taken                        no previous anticoagulant or antiplatelet agents. ASA                        Grade Assessment: II - A patient with mild systemic                        disease. After reviewing the risks and benefits, the                        patient was deemed in satisfactory condition to undergo                        the procedure. The anesthesia plan was to use moderate                        sedation / analgesia (conscious sedation). Immediately                        prior to administration of medications, the patient was                        re- assessed for adequacy to receive sedatives. The heart                        rate, respiratory rate, oxygen saturations, blood                        pressure, adequacy of pulmonary ventilation, and                        response to care were monitored throughout the                        procedure. The physical status of the patient was                        re-assessed after the procedure.                       After obtaining informed consent, the colonoscope was                        passed under direct vision. Throughout the procedure,                        the patient's blood pressure, pulse, and oxygen                        saturations were monitored continuously. The pediatric                         colonoscope was introduced through the anus and advanced                        to the transverse colon for evaluation. This was the                        intended extent. Due to poor prep and patient                        discomfort, scope was not advanced further. The                         colonoscopy was performed without difficulty. The                        patient tolerated the procedure well. The quality of the                        bowel preparation was poor, with solid stool throughout                        the exam.                                                                                   Findings:       The perianal and digital rectal examinations were normal.       A diffuse area of moderately congested, erythematous, granular and        vascular-pattern-decreased mucosa was found in the rectum, in the        recto-sigmoid colon, in the sigmoid colon, in the descending colon and        in the transverse colon. There was overlying exudate that could be        washed off. This was most severe in the rectosigmoid area, which        contained patchy areas of petechiae. Biopsies were taken with a cold        forceps for histology (rushed).                                                                                   Impression:           - Preparation of the colon was poor.                       - Congested, erythematous, granular and                        vascular-pattern-decreased mucosa in the rectum, in the                        recto-sigmoid colon, in the sigmoid colon, in the                        descending colon and in the transverse colon. Biopsied.  Recommendation:      - Return patient to hospital [facility] for ongoing care.                       - Resume previous diet.                       - Continue present medications.                       - Await pathology results (rushed).                                                                                      Electronically signed by: Benita Schumacher MD  __________________  Benita Schumacher MD  3/9/2018 12:14:51 PM  I was physically present for the entire viewing portion of the exam.  __________________________  Signature of teaching physician  Gauri/Dominik Schumacher MD  Number of Addenda: 0    Note Initiated On: 3/9/2018 8:16 AM              Social History:   Reviewed and edited as appropriate  Social History     Social History     Marital status:      Spouse name: N/A     Number of children: N/A     Years of education: N/A     Occupational History     Not on file.     Social History Main Topics     Smoking status: Passive Smoke Exposure - Never Smoker     Last attempt to quit: 3/20/1998     Smokeless tobacco: Never Used     Alcohol use No      Comment: occ     Drug use: No     Sexual activity: Yes     Partners: Male     Birth control/ protection: Surgical     Other Topics Concern     Parent/Sibling W/ Cabg, Mi Or Angioplasty Before 65f 55m? No     Social History Narrative    19 y.o- patient's mother   of lung cancer. She had to take care of her younger sister.    2012- patient's  had a heart attack with stents placed, followed by cardiac rehabilitation    2000 TO 2012  was in [facility] for depression    2013 patient's  went through alcohol rehabilitation at [facility] inpatient            They have attended couple counseling a couple of times and patient went to the family program for chemical dependency.    Patient denies alcohol or drug use and herself            Has 2 children, girls ages 10 and 13     For a while she was working 3 jobs since her  was ill. Works at the [employer] for [employer]. Reports her job is very stressful.                    Family History:   Reviewed and edited as appropriate  No known history of gastrointestinal/liver disease or  gastrointestinal malignancies       Allergies:    Reviewed and edited as appropriate     Allergies   Allergen Reactions     Compazine [Prochlorperazine] Fatigue     Fentanyl Other (See Comments)     sweating     Erythrocin Nausea and Vomiting     Zithromax [Azithromycin Dihydrate] Diarrhea            Medications:     Current Facility-Administered Medications   Medication     calcium carbonate (TUMS) chewable tablet 500 mg     predniSONE (DELTASONE) tablet 100 mg     acetaminophen (TYLENOL) tablet 1,000 mg     cholecalciferol (vitamin D3) tablet 3,000 Units     rOPINIRole (REQUIP) tablet 0.25 mg     ondansetron (ZOFRAN) injection 8 mg    Or     ondansetron (ZOFRAN-ODT) ODT tab 8 mg    Or     ondansetron (ZOFRAN) tablet 8 mg     lactated ringers infusion     venlafaxine (EFFEXOR-XR) 24 hr capsule 150 mg     metroNIDAZOLE (FLAGYL) tablet 500 mg     oxyCODONE IR (ROXICODONE) tablet 5-10 mg     HYDROmorphone (PF) (DILAUDID) injection 0.5-1 mg     naloxone (NARCAN) injection 0.1-0.4 mg     LORazepam (ATIVAN) tablet 0.5-1 mg     Facility-Administered Medications Ordered in Other Encounters   Medication     lidocaine 1 % 9 mL     sodium bicarbonate 8.4 % injection 1 mEq             Review of Systems:   A complete review of systems was performed and is negative except as noted in the HPI           Physical Exam:   /70 (BP Location: Left arm)  Pulse 96  Temp 98  F (36.7  C) (Oral)  Resp 17  Wt 216 lb 11.2 oz (98.3 kg)  SpO2 94%  BMI 38.39 kg/m2  Wt:   Wt Readings from Last 2 Encounters:   03/09/18 216 lb 11.2 oz (98.3 kg)   03/04/18 212 lb 11.9 oz (96.5 kg)      Constitutional: cooperative, pleasant, not dyspneic/diaphoretic, no acute distress  Eyes: Sclera anicteric/injected  Ears/nose/mouth/throat: Normal oropharynx without ulcers or exudate, mucus membranes moist, hearing intact  Neck: supple, thyroid normal size  CV: No edema  Respiratory: Unlabored breathing  Lymph: No axillary, submandibular, supraclavicular or inguinal lymphadenopathy  Abd: mild diffuse  abdominal tenderness, soft, non distended. Normal active BS.   Skin: warm, perfused, no jaundice  Neuro: AAO x 3, No asterixis  Psych: Normal affect  MSK: No gross deformities         Data:   Labs and imaging below were independently reviewed and interpreted    BMP  Recent Labs  Lab 03/09/18  0715 03/07/18  1229 03/06/18  0545 03/04/18  1130    138 138 139   POTASSIUM 3.6 3.7 3.6 3.8   CHLORIDE 103 106 104 106   PATRICIA 8.6 8.6 7.9* 8.3*   CO2 29 30 29 28   BUN 7 5* 8 10   CR 0.81 0.64 0.65 0.62   * 147* 91 67*     CBC  Recent Labs  Lab 03/09/18  0715 03/07/18  1229 03/06/18  0545 03/05/18  0925   WBC 8.4 6.7 8.4 8.4   RBC 3.91 4.00 4.06 4.15   HGB 10.7* 11.1* 11.3* 11.4*   HCT 34.0* 34.5* 34.8* 35.6   MCV 87 86 86 86   MCH 27.4 27.8 27.8 27.5   MCHC 31.5 32.2 32.5 32.0   RDW 14.3 13.4 13.2 13.4    321 336 329     INR  Recent Labs  Lab 03/09/18  0715   INR 0.99     LFTs  Recent Labs  Lab 03/04/18  1130 03/03/18  0813   ALKPHOS 65 63   AST 39 17   ALT 60* 32   BILITOTAL 0.1* 0.1*   PROTTOTAL 7.0 7.3   ALBUMIN 3.3* 3.5      PANCNo lab results found in last 7 days.    Imaging:  CT a/p with contrast 3/4/18  IMPRESSION:  1. Probable infectious or inflammatory proctocolitis as described.  Surrounding mesorectal lymph nodes raise the possibility of a more  chronic inflammatory process such as ulcerative colitis. Underlying  mass should likely be excluded with follow-up colonoscopy if not  previously performed.  2. Simple cyst left hepatic lobe unchanged dating back to at least  2012. No further follow-up required. Abdominal and pelvic organs are  otherwise within normal limits.

## 2018-03-10 NOTE — PLAN OF CARE
Problem: Patient Care Overview  Goal: Plan of Care/Patient Progress Review  Outcome: Therapy, progress toward functional goals is gradual  VSS. Denies pain. PRN ativan given per pt request for anxiety. PIV infusing MIVFs. Up with SBA. Tolerating regular diet. Continue with POC.

## 2018-03-10 NOTE — PROGRESS NOTES
1530 to 1930:VSS,pt is alert and oriented ,sleeping alessandra an don ,when awake pt anxious and crying ,c/o feeling of sadness and anxiousness .ativan 1 mg given PO which was effective.LS clear,BS+,no BM,adequate urinary output.

## 2018-03-10 NOTE — PROGRESS NOTES
Nursing Focus: Discharge    D: Patient discharged to home at 11:45. Patient transported by father.  All belongings sent with patient.    I: Discharge prescriptions sent to discharge pharmacy to be filled. All discharge medications and instructions reviewed with patient. Patient instructed to call clinic triage nurse if patient  experiences a fever >100.4, uncontrolled nausea, vomiting, diarrhea, or pain; or experiences any signs or symptoms of bleeding. Other phone numbers to call with questions or concerns after discharge reviewed with patient and her father. Follow-up outpatient appointment scheduled reviewed with patient.  Right peripheral IV  removed. Education completed.  Care Plan goals met and adequate for discharge.    A: Doretha verbalized understanding of discharge medications and instructions. Patient will  medications at discharge pharmacy.  Patient home medications that were with [employer] returned to patient.     P: Patient to follow-up in clinic with oncology  in 5 days.

## 2018-03-10 NOTE — PLAN OF CARE
Problem: Patient Care Overview  Goal: Plan of Care/Patient Progress Review  Outcome: No Change  Patient continues to be very anxious and teary with crying bouts on and off during night shift.Unable to sleep and very anxious so ativan given.Then patient complained of abdominal pain and headache at start of shift tylenol given 1st with little relief -Then iv dilaudid  given with some relief .Abdominal pain is present but less severe than 24 hrs ago.Recieved 2nd iv dilaudid dose. Has had 2 loose stools-brown in color.Awaiting biopsy results today.Continue with POC.

## 2018-03-10 NOTE — PLAN OF CARE
"Problem: Patient Care Overview  Goal: Plan of Care/Patient Progress Review  Outcome: Improving  7am-11:30am     Vital Signs: VSS     IV Access: RIght forearm PIV. Patent, infusing LR @ 100 ml/hr. Dressing C/D/I    Pain: Reported continuous lower abdominal pain/discomfort. Bowel sounds audible in all areas, passing flatus. Still having loose stools. Reported nausea x 1. PRN PO oxycodone x 1, PRN Zofran ODT x 1, PRN PO tums x 1 given with pt reporting minimal relief.     GI: Appetite is good, ate 100% of breakfast.     Respiratory: Lung sounds CTA, denies cough or shortness of breath this shift. Encouraged pt to increase activity.     Activity: Needed strong encouragement to increase activity. Pt was reluctant to walk in halls d/t pain. Educated pt on importance of increasing activity as tolerated.     Mood/Affect: Pt anxious and restless this shift. Reported \"not sleeping all night\". Emotional support provided and encouraged pt to talk with staff.             "

## 2018-03-10 NOTE — DISCHARGE SUMMARY
[facility], [place]    Discharge Summary  Hematology / Oncology    Date of Admission:  3/8/2018  Date of Discharge:  3/10/2018 11:31 AM  Discharging Provider: Cyndie Bell DNP, APRN, CNP  Primary Heme/Oncologist: Dr. Richards    Discharge Diagnoses      Proctocolitis  Anxiety state  Metastatic malignant melanoma (H)  Intestinal giardiasis    History of Present Illness   Doretha Fernandez is a 43 year old woman with PMH of metastatic melanoma on treatment with nivolumab, depression/anxiety, TIA, migraines, and prothrombin deficiency. She was admitted at [facility] from 3/4-3/8 for workup of bloody stools. She wass transferred to [facility] for ongoing care and colonoscopy with biopsies.    Hospital Course   Doretha Fernandez was admitted on 3/8/2018.  The following problems were addressed during her hospitalization:    # Rectal bleeding.   # Intestinal giardiasis.    Initially presented with diarrhea mixed with blood and pus after her most recent nivolumab infusion. She tested positive for giardia antigen 2/23/2018 and was treated with a single dose of tinidazole. Repeat testing 2/28 was negative. She did not notice much improvement in her sx. There was concern these sx are r/t nivolumab so drug was held and she was given a steroid taper on 3/1, which has since been stopped. On 3/3 she presented to [facility] with 2 week h/o ongoing bloody mucousy liquid without stool. She feels constipated with real stool about a week ago, but continues to mucousy bloody BMs 4-5 times per day, with sometimes a spoonfull to a half cup full of blood.  Repeat giardia antigen test 3/3 was again positive. Other stool infectious work up has been negative twice including enteric pathogen panel, C diff and O&P. CT abd/pelvis 3/4 shows colonic wall thickening and mucosal enhancement involving rectum and distal sigmoid c/w infectious coloproctitis vs inflammatory bowel disease. Fecal lactoferrin was positive x2, though this is  a nonspecific lab finding. C.diff testing was negative. Differential diagnosis appears to be persistent giardiasis vs nivolumab colitis. 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 GI recommended colonoscopy with biopsies d/t ongoing sx.    -GI consulted and did colonoscopy 3/9. +Inflammation and petechiae noted. Biopsies taken; results pending.   -Will treat empirically for nivolumab colitis with prednisone 1mg/kg/day = 100mg x7 days, then decrease to 50mg x7 days, then will have Dr. Richards decide on further course pending symptoms and bx results.  -Continue metronidazole 500mg PO q8h x 2 weeks (through 3/17/18).   -Discharged with zofran and ativan PRN nausea/vomiting.  -Discharged with dilaudid 4-6mg PO q3h PRN pain.   -Tolerating regular diet.       # Headache with h/o migraines.  Occipital headache that wraps around to frontal. Similar to prior migraines. Reports daily migraines stopped after diagnosis of her melanoma. Had stopped all migraine medications and had not had headache recurrence until day of admission to [facility]. No c/o HA at this time. Monitor.       # Metastatic malignant melanoma.   Diagnosed on LN bx in Oct 2017. Primary site not identified. Followed by Dr. Richards. Has been on treatment with nivolumab since Nov every 2 weeks. Last dose was #7 on 2/15. Nivolumab currently on hold d/t persistent bloody stools. F/u with Dr. Richards on 3/15 to discuss treatment plan.       # Depression.  # Anxiety.   Depression has worsened with recent cancer diagnosis, current illness, separation from her kids, etc.   -Continue venlafaxine.   -Consider palliative care SW or  involvement for help with coping.       # Prothrombin gene mutation.  # H/o TIA/stroke.   Follows with Dr. Richards. H/o stroke/TIA following both pregnancies. Hypercoagulability w/u positive for prothrombin gene mutation. No h/o DVT/PE.  -HOLD home ASA given rectal bleeding.      Cyndie Bell DNP, APRN,  CNP  Hematology/Oncology  Pager: [phone]      Significant Results and Procedures   Results for orders placed or performed during the hospital encounter of 03/04/18   CT Abdomen Pelvis w Contrast    Narrative    CT ABDOMEN AND PELVIS WITH CONTRAST 3/4/2018 12:16 PM     HISTORY: Bloody diarrhea/abdominal pain, history of melanoma, axilla  surgery in October 2017.       COMPARISON: PET/CT 10/11/2017.    TECHNIQUE: Axial images from the lung bases to the symphysis are  performed with additional coronal reformatted images. 100 mL of Isovue  370 are given intravenously.  Radiation dose for this scan was reduced  using automated exposure control, adjustment of the mA and/or kV  according to patient size, or iterative reconstruction technique.    FINDINGS:   The lung bases are clear.    Abdomen: Tiny cyst measuring less than 1 cm is present in the left  hepatic dome. This was present on a prior contrast-enhanced CT from  2012 and is therefore considered a benign cyst. The liver is otherwise  unremarkable. The spleen, gallbladder, fatty replaced pancreas,  adrenal glands, and kidneys are unremarkable. No hydronephrosis. No  enlarged lymph nodes. The bowel is normal in caliber without  obstruction or diverticulitis. Appendix is normal.    Pelvis: The bladder, uterus, and left ovary are unremarkable. Probable  dominant follicle right ovary is noted. No enlarged pelvic or inguinal  lymph nodes. There is colonic wall thickening and mucosal enhancement  involving the rectum and distal sigmoid colon with surrounding  perirectal adenopathy. In the surrounding mesorectal fat, small pelvic  sidewall lymph nodes are also noted. Findings could reflect an  infectious coloproctitis versus an inflammatory bowel disease such as  ulcerative colitis. Bone window examination is unremarkable.      Impression    IMPRESSION:  1. Probable infectious or inflammatory proctocolitis as described.  Surrounding mesorectal lymph nodes raise the  possibility of a more  chronic inflammatory process such as ulcerative colitis. Underlying  mass should likely be excluded with follow-up colonoscopy if not  previously performed.  2. Simple cyst left hepatic lobe unchanged dating back to at least  2012. No further follow-up required. Abdominal and pelvic organs are  otherwise within normal limits.    ALEX RIVERA MD   US leg bilateral venous    Narrative    VENOUS ULTRASOUND BOTH LEGS  3/6/2018 8:36 PM     HISTORY: rule out DVT;     COMPARISON: None.    FINDINGS: Examination of the deep veins with graded compression and  color flow Doppler with spectral wave form analysis shows  no evidence  of thrombus in the common femoral vein, femoral vein, popliteal vein  or calf veins.  There is no venous insufficiency.      Impression    IMPRESSION: No DVT is identified in either lower extremity    JD HAYES MD       Unresulted Labs Ordered in the Past 30 Days of this Admission     Date and Time Order Name Status Description    3/9/2018 1205 Surgical pathology exam In process           Code Status   Full Code    Physical Exam   Temp: 98.4  F (36.9  C) Temp src: Oral BP: 96/52   Heart Rate: 97 Resp: 20 SpO2: 93 % O2 Device: None (Room air)    Vitals:    03/08/18 1914 03/09/18 0749 03/10/18 0903   Weight: 97.3 kg (214 lb 9.6 oz) 98.3 kg (216 lb 11.2 oz) 98.3 kg (216 lb 12.8 oz)     Vital Signs with Ranges  Temp:  [97.8  F (36.6  C)-98.9  F (37.2  C)] 98.4  F (36.9  C)  Heart Rate:  [72-97] 97  Resp:  [16-20] 20  BP: ()/(52-78) 96/52  SpO2:  [93 %-96 %] 93 %  I/O last 3 completed shifts:  In: 2361.67 [P.O.:360; I.V.:2001.67]  Out: -     Constitutional: Pleasant woman seen resting in bed in NAD. Tired but alert, appropriately interactive.   HEENT: NCAT. PERRL, anicteric sclera. MMM, no lesions or thrush.  Respiratory: Non-labored breathing on RA. Lungs CTAB.   Cardiovascular: Regular rate and rhythm. No murmur or rub.   GI: Normoactive bowel sounds. Abdomen soft,  non-distended, diffusely TTP keisha in LLQ.   Genitourinary: Deferred.   Skin: Pale, warm and dry. No concerning lesions or rash on exposed surfaces.  Musculoskeletal: Extremities grossly normal, non-tender, no edema.  Neurologic: Alert, oriented, speech normal, no focal deficits.     Discharge Disposition   Discharged to home  Condition at discharge: Stable    Consultations This Hospital Stay   GI LUMINAL ADULT IP CONSULT  VASCULAR ACCESS CARE ADULT IP CONSULT    Discharge Orders     Reason for your hospital stay   You were admitted for colonoscopy due to persistent loose and bloody stools     Follow Up and recommended labs and tests   Appointment with Dr. Richards in clinic next week (3/15) as previously scheduled and listed below. At that appointment you will discuss colonoscopy biopsy results, further management of proctocolitis, and ongoing cancer therapy.     Activity   Your activity upon discharge: Activity as tolerated. No driving or strenuous activity while taking narcotics, if having headaches/dizziness/vision changes, or if feeling generally weak or unwell.     When to contact your care team   Call your local cancer clinic ([facility]) during normal business hours or the [facility] 24-hour triage line at [phone] for temperature >100.5, uncontrolled nausea/vomiting/diarrhea/constipation (more than 6 large, loose stools per day), unrelieved pain, bleeding not relieved with pressure, dizziness, chest pain, shortness of breath, loss of consciousness, and any new or concerning symptoms.     Full Code     Diet   Follow this diet upon discharge: Regular diet as tolerated. Be sure to drink plenty of non-caffeinated beverages.       Discharge Medications   Discharge Medication List as of 3/10/2018 11:07 AM      START taking these medications    Details   HYDROmorphone (DILAUDID) 4 MG tablet Take 1-1.5 tablets (4-6 mg) by mouth every 3 hours as needed for moderate to severe pain, Disp-170 tablet, R-0,  "Local Print      LORazepam (ATIVAN) 0.5 MG tablet Take 1-2 tablets (0.5-1 mg) by mouth every 6 hours as needed for anxiety or other (nausea), Disp-40 tablet, R-0, Local Print      ondansetron (ZOFRAN-ODT) 8 MG ODT tab Take 1 tablet (8 mg) by mouth every 8 hours as needed for nausea or vomiting, Disp-60 tablet, R-0, E-Prescribe      metroNIDAZOLE (FLAGYL) 500 MG tablet Take 1 tablet (500 mg) by mouth every 8 hours for 8 days, Disp-24 tablet, R-0, E-Prescribe      predniSONE (DELTASONE) 50 MG tablet Take 100mg PO qAM with breakfast x 5 days starting on Sun 3/11, then decrease to 50mg PO qAM. Then taper as instructed by your oncologist., Disp-17 tablet, R-2, E-Prescribe         CONTINUE these medications which have NOT CHANGED    Details   Acetaminophen (TYLENOL PO) Take 1,000 mg by mouth every 8 hours as needed for mild pain or fever, Historical      venlafaxine (EFFEXOR-ER) 150 MG TB24 24 hr tablet Take 1 tablet (150 mg) by mouth daily (with breakfast), Disp-30 each, R-0, E-Prescribe      !! order for DME Equipment being ordered: x2 Wearease compression shirtDisp-1 each, R-0, Local Print      !! order for DME Equipment being ordered: 20-30mmHg compression sleeve and 20-30mmHg compression glove\" x2 pairDisp-1 Units, R-0, No Print Out      rOPINIRole (REQUIP) 0.25 MG tablet Take 1 tablet (0.25 mg) by mouth At Bedtime, Disp-30 tablet, R-11, E-PrescribeFile until needed      Cholecalciferol (VITAMIN D3) 3000 UNITS TABS Take 3,000 Int'l Units by mouth daily, Disp-30 tablet, R-3, E-Prescribe       !! - Potential duplicate medications found. Please discuss with provider.      STOP taking these medications       oxyCODONE IR (ROXICODONE) 5 MG tablet Comments:   Reason for Stopping:         metroNIDAZOLE (FLAGYL) 5 mg/mL infusion Comments:   Reason for Stopping:             Allergies   Allergies   Allergen Reactions     Compazine [Prochlorperazine] Fatigue     Fentanyl Other (See Comments)     sweating     Erythrocin Nausea " and Vomiting     Zithromax [Azithromycin Dihydrate] Diarrhea     Data   CBC    Recent Labs  Lab 03/10/18  0638 03/09/18  0715 03/07/18  1229 03/06/18  0545   WBC 10.4 8.4 6.7 8.4   RBC 3.87 3.91 4.00 4.06   HGB 10.6* 10.7* 11.1* 11.3*   HCT 33.7* 34.0* 34.5* 34.8*   MCV 87 87 86 86   MCH 27.4 27.4 27.8 27.8   MCHC 31.5 31.5 32.2 32.5   RDW 14.2 14.3 13.4 13.2    302 321 336     CMP    Recent Labs  Lab 03/10/18  0638 03/09/18 0715 03/07/18  1229 03/06/18  0545 03/04/18  1130    139 138 138 139   POTASSIUM 3.8 3.6 3.7 3.6 3.8   CHLORIDE 106 103 106 104 106   CO2 26 29 30 29 28   ANIONGAP 8 6 2* 5 5   * 101* 147* 91 67*   BUN 9 7 5* 8 10   CR 0.64 0.81 0.64 0.65 0.62   GFRESTIMATED >90 78 >90 >90 >90   GFRESTBLACK >90 >90 >90 >90 >90   PATRICIA 8.7 8.6 8.6 7.9* 8.3*   MAG  --   --  2.0  --   --    PROTTOTAL  --   --   --   --  7.0   ALBUMIN  --   --   --   --  3.3*   BILITOTAL  --   --   --   --  0.1*   ALKPHOS  --   --   --   --  65   AST  --   --   --   --  39   ALT  --   --   --   --  60*     INR    Recent Labs  Lab 03/09/18  0715   INR 0.99

## 2018-03-10 NOTE — PROGRESS NOTES
[facility], [place]    Discharge Summary  Hematology / Oncology    Date of Admission:  3/8/2018  Date of Discharge:  3/10/2018 11:31 AM  Discharging Provider: Cyndie Bell DNP, APRN, CNP  Primary Heme/Oncologist: Dr. Richards    Discharge Diagnoses      Proctocolitis  Anxiety state  Metastatic malignant melanoma (H)  Intestinal giardiasis    History of Present Illness   Doretha Fernandez is a 43 year old woman with PMH of metastatic melanoma on treatment with nivolumab, depression/anxiety, TIA, migraines, and prothrombin deficiency. She was admitted at [facility] from 3/4-3/8 for workup of bloody stools. She wass transferred to [facility] for ongoing care and colonoscopy with biopsies.    Hospital Course   Doretha Fernandez was admitted on 3/8/2018.  The following problems were addressed during her hospitalization:    # Rectal bleeding.   # Intestinal giardiasis.    Initially presented with diarrhea mixed with blood and pus after her most recent nivolumab infusion. She tested positive for giardia antigen 2/23/2018 and was treated with a single dose of tinidazole. Repeat testing 2/28 was negative. She did not notice much improvement in her sx. There was concern these sx are r/t nivolumab so drug was held and she was given a steroid taper on 3/1, which has since been stopped. On 3/3 she presented to [facility] with 2 week h/o ongoing bloody mucousy liquid without stool. She feels constipated with real stool about a week ago, but continues to mucousy bloody BMs 4-5 times per day, with sometimes a spoonfull to a half cup full of blood.  Repeat giardia antigen test 3/3 was again positive. Other stool infectious work up has been negative twice including enteric pathogen panel, C diff and O&P. CT abd/pelvis 3/4 shows colonic wall thickening and mucosal enhancement involving rectum and distal sigmoid c/w infectious coloproctitis vs inflammatory bowel disease. Fecal lactoferrin was positive x2, though this is  a nonspecific lab finding. C.diff testing was negative. Differential diagnosis appears to be persistent giardiasis vs nivolumab colitis. 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 GI recommended colonoscopy with biopsies d/t ongoing sx.    -GI consulted and did colonoscopy 3/9. +Inflammation and petechiae noted. Biopsies taken; results pending.   -Will treat empirically for nivolumab colitis with prednisone 1mg/kg/day = 100mg x7 days, then decrease to 50mg x7 days, then will have Dr. Richards decide on further course pending symptoms and bx results.  -Continue metronidazole 500mg PO q8h x 2 weeks (through 3/17/18).   -Discharged with zofran and ativan PRN nausea/vomiting.  -Discharged with dilaudid 4-6mg PO q3h PRN pain.   -Tolerating regular diet.       # Headache with h/o migraines.  Occipital headache that wraps around to frontal. Similar to prior migraines. Reports daily migraines stopped after diagnosis of her melanoma. Had stopped all migraine medications and had not had headache recurrence until day of admission to [facility]. No c/o HA at this time. Monitor.       # Metastatic malignant melanoma.   Diagnosed on LN bx in Oct 2017. Primary site not identified. Followed by Dr. Richards. Has been on treatment with nivolumab since Nov every 2 weeks. Last dose was #7 on 2/15. Nivolumab currently on hold d/t persistent bloody stools. F/u with Dr. Richards on 3/15 to discuss treatment plan.       # Depression.  # Anxiety.   Depression has worsened with recent cancer diagnosis, current illness, separation from her kids, etc.   -Continue venlafaxine.   -Consider palliative care SW or  involvement for help with coping.       # Prothrombin gene mutation.  # H/o TIA/stroke.   Follows with Dr. Richards. H/o stroke/TIA following both pregnancies. Hypercoagulability w/u positive for prothrombin gene mutation. No h/o DVT/PE.  -HOLD home ASA given rectal bleeding.      Cyndie Bell DNP, APRN,  CNP  Hematology/Oncology  Pager: [phone]      Significant Results and Procedures   Results for orders placed or performed during the hospital encounter of 03/04/18   CT Abdomen Pelvis w Contrast    Narrative    CT ABDOMEN AND PELVIS WITH CONTRAST 3/4/2018 12:16 PM     HISTORY: Bloody diarrhea/abdominal pain, history of melanoma, axilla  surgery in October 2017.       COMPARISON: PET/CT 10/11/2017.    TECHNIQUE: Axial images from the lung bases to the symphysis are  performed with additional coronal reformatted images. 100 mL of Isovue  370 are given intravenously.  Radiation dose for this scan was reduced  using automated exposure control, adjustment of the mA and/or kV  according to patient size, or iterative reconstruction technique.    FINDINGS:   The lung bases are clear.    Abdomen: Tiny cyst measuring less than 1 cm is present in the left  hepatic dome. This was present on a prior contrast-enhanced CT from  2012 and is therefore considered a benign cyst. The liver is otherwise  unremarkable. The spleen, gallbladder, fatty replaced pancreas,  adrenal glands, and kidneys are unremarkable. No hydronephrosis. No  enlarged lymph nodes. The bowel is normal in caliber without  obstruction or diverticulitis. Appendix is normal.    Pelvis: The bladder, uterus, and left ovary are unremarkable. Probable  dominant follicle right ovary is noted. No enlarged pelvic or inguinal  lymph nodes. There is colonic wall thickening and mucosal enhancement  involving the rectum and distal sigmoid colon with surrounding  perirectal adenopathy. In the surrounding mesorectal fat, small pelvic  sidewall lymph nodes are also noted. Findings could reflect an  infectious coloproctitis versus an inflammatory bowel disease such as  ulcerative colitis. Bone window examination is unremarkable.      Impression    IMPRESSION:  1. Probable infectious or inflammatory proctocolitis as described.  Surrounding mesorectal lymph nodes raise the  possibility of a more  chronic inflammatory process such as ulcerative colitis. Underlying  mass should likely be excluded with follow-up colonoscopy if not  previously performed.  2. Simple cyst left hepatic lobe unchanged dating back to at least  2012. No further follow-up required. Abdominal and pelvic organs are  otherwise within normal limits.    ALEX RIVERA MD   US leg bilateral venous    Narrative    VENOUS ULTRASOUND BOTH LEGS  3/6/2018 8:36 PM     HISTORY: rule out DVT;     COMPARISON: None.    FINDINGS: Examination of the deep veins with graded compression and  color flow Doppler with spectral wave form analysis shows  no evidence  of thrombus in the common femoral vein, femoral vein, popliteal vein  or calf veins.  There is no venous insufficiency.      Impression    IMPRESSION: No DVT is identified in either lower extremity    JD HAYES MD       Unresulted Labs Ordered in the Past 30 Days of this Admission     Date and Time Order Name Status Description    3/9/2018 1205 Surgical pathology exam In process           Code Status   Full Code    Physical Exam   Temp: 98.4  F (36.9  C) Temp src: Oral BP: 96/52   Heart Rate: 97 Resp: 20 SpO2: 93 % O2 Device: None (Room air)    Vitals:    03/08/18 1914 03/09/18 0749 03/10/18 0903   Weight: 97.3 kg (214 lb 9.6 oz) 98.3 kg (216 lb 11.2 oz) 98.3 kg (216 lb 12.8 oz)     Vital Signs with Ranges  Temp:  [97.8  F (36.6  C)-98.9  F (37.2  C)] 98.4  F (36.9  C)  Heart Rate:  [72-97] 97  Resp:  [16-20] 20  BP: ()/(52-78) 96/52  SpO2:  [93 %-96 %] 93 %  I/O last 3 completed shifts:  In: 2361.67 [P.O.:360; I.V.:2001.67]  Out: -     Constitutional: Pleasant woman seen resting in bed in NAD. Tired but alert, appropriately interactive.   HEENT: NCAT. PERRL, anicteric sclera. MMM, no lesions or thrush.  Respiratory: Non-labored breathing on RA. Lungs CTAB.   Cardiovascular: Regular rate and rhythm. No murmur or rub.   GI: Normoactive bowel sounds. Abdomen soft,  non-distended, diffusely TTP keisha in LLQ.   Genitourinary: Deferred.   Skin: Pale, warm and dry. No concerning lesions or rash on exposed surfaces.  Musculoskeletal: Extremities grossly normal, non-tender, no edema.  Neurologic: Alert, oriented, speech normal, no focal deficits.     Discharge Disposition   Discharged to home  Condition at discharge: Stable    Consultations This Hospital Stay   GI LUMINAL ADULT IP CONSULT  VASCULAR ACCESS CARE ADULT IP CONSULT    Discharge Orders     Reason for your hospital stay   You were admitted for colonoscopy due to persistent loose and bloody stools     Follow Up and recommended labs and tests   Appointment with Dr. Richards in clinic next week (3/15) as previously scheduled and listed below. At that appointment you will discuss colonoscopy biopsy results, further management of proctocolitis, and ongoing cancer therapy.     Activity   Your activity upon discharge: Activity as tolerated. No driving or strenuous activity while taking narcotics, if having headaches/dizziness/vision changes, or if feeling generally weak or unwell.     When to contact your care team   Call your local cancer clinic ([facility]) during normal business hours or the [facility] 24-hour triage line at [phone] for temperature >100.5, uncontrolled nausea/vomiting/diarrhea/constipation (more than 6 large, loose stools per day), unrelieved pain, bleeding not relieved with pressure, dizziness, chest pain, shortness of breath, loss of consciousness, and any new or concerning symptoms.     Full Code     Diet   Follow this diet upon discharge: Regular diet as tolerated. Be sure to drink plenty of non-caffeinated beverages.       Discharge Medications   Discharge Medication List as of 3/10/2018 11:07 AM      START taking these medications    Details   HYDROmorphone (DILAUDID) 4 MG tablet Take 1-1.5 tablets (4-6 mg) by mouth every 3 hours as needed for moderate to severe pain, Disp-170 tablet, R-0,  "Local Print      LORazepam (ATIVAN) 0.5 MG tablet Take 1-2 tablets (0.5-1 mg) by mouth every 6 hours as needed for anxiety or other (nausea), Disp-40 tablet, R-0, Local Print      ondansetron (ZOFRAN-ODT) 8 MG ODT tab Take 1 tablet (8 mg) by mouth every 8 hours as needed for nausea or vomiting, Disp-60 tablet, R-0, E-Prescribe      metroNIDAZOLE (FLAGYL) 500 MG tablet Take 1 tablet (500 mg) by mouth every 8 hours for 8 days, Disp-24 tablet, R-0, E-Prescribe      predniSONE (DELTASONE) 50 MG tablet Take 100mg PO qAM with breakfast x 5 days starting on Sun 3/11, then decrease to 50mg PO qAM. Then taper as instructed by your oncologist., Disp-17 tablet, R-2, E-Prescribe         CONTINUE these medications which have NOT CHANGED    Details   Acetaminophen (TYLENOL PO) Take 1,000 mg by mouth every 8 hours as needed for mild pain or fever, Historical      venlafaxine (EFFEXOR-ER) 150 MG TB24 24 hr tablet Take 1 tablet (150 mg) by mouth daily (with breakfast), Disp-30 each, R-0, E-Prescribe      !! order for DME Equipment being ordered: x2 Wearease compression shirtDisp-1 each, R-0, Local Print      !! order for DME Equipment being ordered: 20-30mmHg compression sleeve and 20-30mmHg compression glove\" x2 pairDisp-1 Units, R-0, No Print Out      rOPINIRole (REQUIP) 0.25 MG tablet Take 1 tablet (0.25 mg) by mouth At Bedtime, Disp-30 tablet, R-11, E-PrescribeFile until needed      Cholecalciferol (VITAMIN D3) 3000 UNITS TABS Take 3,000 Int'l Units by mouth daily, Disp-30 tablet, R-3, E-Prescribe       !! - Potential duplicate medications found. Please discuss with provider.      STOP taking these medications       oxyCODONE IR (ROXICODONE) 5 MG tablet Comments:   Reason for Stopping:         metroNIDAZOLE (FLAGYL) 5 mg/mL infusion Comments:   Reason for Stopping:             Allergies   Allergies   Allergen Reactions     Compazine [Prochlorperazine] Fatigue     Fentanyl Other (See Comments)     sweating     Erythrocin Nausea " and Vomiting     Zithromax [Azithromycin Dihydrate] Diarrhea     Data   CBC  Recent Labs  Lab 03/10/18  0638 03/09/18  0715 03/07/18  1229 03/06/18  0545   WBC 10.4 8.4 6.7 8.4   RBC 3.87 3.91 4.00 4.06   HGB 10.6* 10.7* 11.1* 11.3*   HCT 33.7* 34.0* 34.5* 34.8*   MCV 87 87 86 86   MCH 27.4 27.4 27.8 27.8   MCHC 31.5 31.5 32.2 32.5   RDW 14.2 14.3 13.4 13.2    302 321 336     CMP  Recent Labs  Lab 03/10/18  0638 03/09/18 0715 03/07/18  1229 03/06/18  0545 03/04/18  1130    139 138 138 139   POTASSIUM 3.8 3.6 3.7 3.6 3.8   CHLORIDE 106 103 106 104 106   CO2 26 29 30 29 28   ANIONGAP 8 6 2* 5 5   * 101* 147* 91 67*   BUN 9 7 5* 8 10   CR 0.64 0.81 0.64 0.65 0.62   GFRESTIMATED >90 78 >90 >90 >90   GFRESTBLACK >90 >90 >90 >90 >90   PATRICIA 8.7 8.6 8.6 7.9* 8.3*   MAG  --   --  2.0  --   --    PROTTOTAL  --   --   --   --  7.0   ALBUMIN  --   --   --   --  3.3*   BILITOTAL  --   --   --   --  0.1*   ALKPHOS  --   --   --   --  65   AST  --   --   --   --  39   ALT  --   --   --   --  60*     INR  Recent Labs  Lab 03/09/18  0715   INR 0.99

## 2018-03-12 ENCOUNTER — TELEPHONE (OUTPATIENT)
Dept: ONCOLOGY | Facility: CLINIC | Age: 43
End: 2018-03-12

## 2018-03-12 DIAGNOSIS — R19.7 DIARRHEA: ICD-10-CM

## 2018-03-12 DIAGNOSIS — K52.9 COLITIS: ICD-10-CM

## 2018-03-12 DIAGNOSIS — C43.9 METASTATIC MALIGNANT MELANOMA (H): Primary | ICD-10-CM

## 2018-03-12 DIAGNOSIS — C43.61 MALIGNANT MELANOMA OF RIGHT UPPER EXTREMITY INCLUDING SHOULDER (H): Primary | ICD-10-CM

## 2018-03-12 DIAGNOSIS — C43.62 MALIGNANT MELANOMA OF LEFT UPPER EXTREMITY INCLUDING SHOULDER (H): ICD-10-CM

## 2018-03-12 DIAGNOSIS — C43.9 METASTATIC MALIGNANT MELANOMA (H): ICD-10-CM

## 2018-03-12 NOTE — TELEPHONE ENCOUNTER
"Status Check:    Pt is a 43 year old female, recently in hospital for  \"Colitis-likely immune related\". On active immune therapy with Opdivo, this has been on hold since onset of diarrhea Mid February 2018. Last infusion 02.15.18.   Call placed for status check.    Primary care provider is: Anna Naidu    Diagnosis:   Encounter Diagnoses   Name Primary?     Malignant melanoma of right upper extremity including shoulder (H) Yes     Malignant melanoma of left upper extremity including shoulder (H)      Metastatic malignant melanoma (H)      Diarrhea      Colitis      Oncology provider is:  Dr. AMANDA Richards    How are you doing/feeling?: \"terrible.  I still have the diarrhea.  It's so bad I have to wear a Depends\".  Continues to have abdominal cramping and multiple episodes of diarrhea.  Denies bloody stool.  Is taking the Flagyl and Prednisone as prescribed.  Denies fever nor chills.  Is able to eat and drink small amounts.  Any further issues?: Pt voices concerns with her employment.  Very tearful when talking about it.  The hospital wrote her a letter to extend her leave from work, and per patient, her employer will not accept it.  Will draft a new letter extending her leave, after discussing with Dr. Richards. Pt will call our office with fax # for her employer.  Next Follow up/Recommendations: Pt is scheduled to see Dr. Richards Thursday 03.15.18 with lab draw and possible Opdivo.  Advised patient to keep appts as scheduled and follow regimen per hospital discharge instructions.  Did advise patient to be seen in urgent care if stools become purulent or bloody again or new symptoms develop.    Patient instructed to call with any questions or concerns.  Patient states understanding and is in agreement with this plan.    Approximately 10 minutes spent on telephone with patient reviewing assessment and symptom(s) and providing symptom management.    Jeanie Keenan, RN, BSN, OCN  Oncology Hematology Case " Manager  Breast Cancer Navigator  [facility]  [email or web address]  [phone]

## 2018-03-12 NOTE — TELEPHONE ENCOUNTER
Letter drafted, reviewed and signed by Dr. Ghosh.  Original in envelope at .  Awaiting call back from patient with directions on where to fax letter with restrictions.

## 2018-03-14 NOTE — TELEPHONE ENCOUNTER
"Call received from Maricarmen Mireles, nurse at [facility].  She called to report she spoke with patient last evening and patient was \"having a pretty rough time, crying, and unsure what to do about the continued diarrhea\".  Maricarmen Mireles states she told patient to call us this morning.     Patient did not call us, an writer did status check Monday.    Call placed to patient today and reviewed symptoms, emotional state, and offered an appointment with Dr. Richards today.  Patient declined stating she is doing \"much better today.  Maricarmen Mireles just caught me at a low moment. The diarrhea is still pretty bad but I'm doing ok today.\"  Patient declines coming in today instead of tomorrow.  She would like to keep her appointments as scheduled.    Advised to call back with questions or concerns.  "

## 2018-03-15 ENCOUNTER — ONCOLOGY VISIT (OUTPATIENT)
Dept: ONCOLOGY | Facility: CLINIC | Age: 43
End: 2018-03-15
Attending: INTERNAL MEDICINE
Payer: COMMERCIAL

## 2018-03-15 ENCOUNTER — HOSPITAL ENCOUNTER (OUTPATIENT)
Dept: LAB | Facility: CLINIC | Age: 43
Discharge: HOME | End: 2018-03-15
Attending: INTERNAL MEDICINE | Admitting: INTERNAL MEDICINE
Payer: COMMERCIAL

## 2018-03-15 VITALS
RESPIRATION RATE: 16 BRPM | OXYGEN SATURATION: 96 % | SYSTOLIC BLOOD PRESSURE: 112 MMHG | HEART RATE: 73 BPM | DIASTOLIC BLOOD PRESSURE: 74 MMHG | BODY MASS INDEX: 37.33 KG/M2 | HEIGHT: 63 IN | WEIGHT: 210.7 LBS | TEMPERATURE: 99.1 F

## 2018-03-15 DIAGNOSIS — K52.9 COLITIS: ICD-10-CM

## 2018-03-15 DIAGNOSIS — A07.1 INTESTINAL GIARDIASIS: ICD-10-CM

## 2018-03-15 DIAGNOSIS — C43.9 METASTATIC MALIGNANT MELANOMA (H): Primary | ICD-10-CM

## 2018-03-15 DIAGNOSIS — K52.9 PROCTOCOLITIS: ICD-10-CM

## 2018-03-15 LAB
ALBUMIN SERPL-MCNC: 3.2 G/DL (ref 3.4–5)
ALP SERPL-CCNC: 61 U/L (ref 40–150)
ALT SERPL W P-5'-P-CCNC: 62 U/L (ref 0–50)
ANION GAP SERPL CALCULATED.3IONS-SCNC: 10 MMOL/L (ref 3–14)
AST SERPL W P-5'-P-CCNC: 15 U/L (ref 0–45)
BASOPHILS # BLD AUTO: 0 10E9/L (ref 0–0.2)
BASOPHILS NFR BLD AUTO: 0.2 %
BILIRUB SERPL-MCNC: <0.1 MG/DL (ref 0.2–1.3)
BUN SERPL-MCNC: 15 MG/DL (ref 7–30)
CALCIUM SERPL-MCNC: 8.3 MG/DL (ref 8.5–10.1)
CHLORIDE SERPL-SCNC: 107 MMOL/L (ref 94–109)
CO2 SERPL-SCNC: 24 MMOL/L (ref 20–32)
CREAT SERPL-MCNC: 0.75 MG/DL (ref 0.52–1.04)
DIFFERENTIAL METHOD BLD: ABNORMAL
EOSINOPHIL # BLD AUTO: 0 10E9/L (ref 0–0.7)
EOSINOPHIL NFR BLD AUTO: 0.2 %
ERYTHROCYTE [DISTWIDTH] IN BLOOD BY AUTOMATED COUNT: 14 % (ref 10–15)
GFR SERPL CREATININE-BSD FRML MDRD: 85 ML/MIN/1.7M2
GLUCOSE SERPL-MCNC: 116 MG/DL (ref 70–99)
HCT VFR BLD AUTO: 37 % (ref 35–47)
HGB BLD-MCNC: 12.3 G/DL (ref 11.7–15.7)
IMM GRANULOCYTES # BLD: 0.2 10E9/L (ref 0–0.4)
IMM GRANULOCYTES NFR BLD: 1.2 %
LYMPHOCYTES # BLD AUTO: 3.8 10E9/L (ref 0.8–5.3)
LYMPHOCYTES NFR BLD AUTO: 29.7 %
MAGNESIUM SERPL-MCNC: 2 MG/DL (ref 1.6–2.3)
MCH RBC QN AUTO: 28.2 PG (ref 26.5–33)
MCHC RBC AUTO-ENTMCNC: 33.2 G/DL (ref 31.5–36.5)
MCV RBC AUTO: 85 FL (ref 78–100)
MONOCYTES # BLD AUTO: 0.9 10E9/L (ref 0–1.3)
MONOCYTES NFR BLD AUTO: 7.3 %
NEUTROPHILS # BLD AUTO: 7.8 10E9/L (ref 1.6–8.3)
NEUTROPHILS NFR BLD AUTO: 61.4 %
PLATELET # BLD AUTO: 354 10E9/L (ref 150–450)
POTASSIUM SERPL-SCNC: 3 MMOL/L (ref 3.4–5.3)
PROT SERPL-MCNC: 6.8 G/DL (ref 6.8–8.8)
RBC # BLD AUTO: 4.36 10E12/L (ref 3.8–5.2)
SODIUM SERPL-SCNC: 141 MMOL/L (ref 133–144)
WBC # BLD AUTO: 12.7 10E9/L (ref 4–11)

## 2018-03-15 PROCEDURE — G0463 HOSPITAL OUTPT CLINIC VISIT: HCPCS

## 2018-03-15 PROCEDURE — 80053 COMPREHEN METABOLIC PANEL: CPT | Performed by: INTERNAL MEDICINE

## 2018-03-15 PROCEDURE — 36415 COLL VENOUS BLD VENIPUNCTURE: CPT | Performed by: INTERNAL MEDICINE

## 2018-03-15 PROCEDURE — 99214 OFFICE O/P EST MOD 30 MIN: CPT | Performed by: INTERNAL MEDICINE

## 2018-03-15 PROCEDURE — 85025 COMPLETE CBC W/AUTO DIFF WBC: CPT | Performed by: INTERNAL MEDICINE

## 2018-03-15 PROCEDURE — 83735 ASSAY OF MAGNESIUM: CPT | Performed by: INTERNAL MEDICINE

## 2018-03-15 RX ORDER — PREDNISONE 50 MG/1
TABLET ORAL
Qty: 15 TABLET | Refills: 0 | Status: SHIPPED | OUTPATIENT
Start: 2018-03-15 | End: 2018-04-05

## 2018-03-15 ASSESSMENT — PAIN SCALES - GENERAL: PAINLEVEL: MILD PAIN (2)

## 2018-03-15 NOTE — NURSING NOTE
"Oncology Rooming Note    March 15, 2018 9:37 AM   Doretha Fernandez is a 43 year old female who presents for:    Chief Complaint   Patient presents with     Oncology Clinic Visit     4 week recheck Metastatic malignant melanoma, Labs & Chemo today     Initial Vitals: /74 (BP Location: Right arm, Patient Position: Sitting, Cuff Size: Adult Large)  Pulse 73  Temp 99.1  F (37.3  C) (Tympanic)  Resp 16  Ht 1.6 m (5' 3\")  Wt 95.6 kg (210 lb 11.2 oz)  SpO2 96%  Breastfeeding? No  BMI 37.32 kg/m2 Estimated body mass index is 37.32 kg/(m^2) as calculated from the following:    Height as of this encounter: 1.6 m (5' 3\").    Weight as of this encounter: 95.6 kg (210 lb 11.2 oz). Body surface area is 2.06 meters squared.  Mild Pain (2) Comment: left side.    No LMP recorded.  Allergies reviewed: Yes  Medications reviewed: Yes    Medications: Medication refills not needed today.  Pharmacy name entered into [facility]: [facility] [place], [place] - [address]    Clinical concerns: 4 week recheck Metastatic malignant melanoma, Labs & Chemo today. Hospitalized March 3rd through March 10th. C/o of left sided abdominal pains 2/10. Excessive diarrhea and or bleeding.     7 minutes for nursing intake (face to face time)     Dai Canales [facility]            "

## 2018-03-15 NOTE — PATIENT INSTRUCTIONS
No Opdivo infusions at this time, no IV fluids at this time. We would like to see you back in clinic with Dr. Richards on Thursday next week. You will not need lab draw prior.      Please call our office on Monday with an update on the diarrhea.  Depending on how you're doing we may continue the same dose (100 mg daily) or decrease it to 50 mg daily.    Your prescription (prednisone) has been sent to:   [facility] [place], [place] - [address]  [address] [address]  Phone: [phone] Fax: [phone] Alternate Fax: [phone], [phone]  When you are in need of a refill, please call your pharmacy and they will send us a request.      Copy of appointments, and after visit summary (AVS) given to patient.  If you have any questions during business hours (M-F 8 AM- 4PM), please call Jeanie Keenan RN, BSN, OCN Oncology Hematology /Breast Cancer Navigator at [facility] [phone].   For questions after business hours, or on holidays/weekends, please call our after hours Nurse Triage line [phone]. Thank you.

## 2018-03-15 NOTE — LETTER
3/15/2018         RE: Doretha Fernandez  [address] [facility]  [place] [address]        Dear Colleague,    Thank you for referring your patient, Doretha Fernandez, to the [facility]. Please see a copy of my visit note below.    Spoke with patient, she is still experiencing the pustulant, bloody diarrhea. Denies fever nor chills. Does feel she is experiencing allergies or a cold and has started to take Zyrtec.    Per Dr. Richards's instruction, because the diarrhea is still present, patient will continue on the 100 mg prednisone daily until her appt with him Thursday.    Pt states understanding and will call with further questions/concerns. Direct line provided.    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  She started prednisone 100 mg orally daily with improvement of her diarrhea.  She denies any nausea or vomiting.  She denies any fever or chills.    Review Of Systems:  Constitutional: Negative for fever, chills, and night sweats.  Skin: negative.  Eyes: negative.  Ears/Nose/Throat: negative.  Respiratory: No shortness of breath, dyspnea on exertion, cough, or hemoptysis.  Cardiovascular: negative.  Gastrointestinal: negative.  Genitourinary: negative.  Musculoskeletal: negative.  Neurologic: negative.  Psychiatric: negative.  Hematologic/Lymphatic/Immunologic: negative.  Endocrine: negative.    All other ROS negative unless mentioned in interval history.    Past medical, social, surgical, and family histories reviewed.    Allergies:  Allergies as of 03/15/2018 - Nicola as Reviewed 03/15/2018   Allergen Reaction Noted     Compazine [prochlorperazine] Fatigue 10/12/2017     Fentanyl Other (See Comments)  "10/12/2017     Erythrocin Nausea and Vomiting 03/20/2008     Zithromax [azithromycin dihydrate] Diarrhea 02/17/2012       Current Medications:  Current Outpatient Prescriptions   Medication Sig Dispense Refill     predniSONE (DELTASONE) 50 MG tablet Take 100mg PO qAM with breakfast x 5 days starting on Sun 3/11, then decrease to 50mg PO qAM. Then taper as instructed by your oncologist. 15 tablet 0     HYDROmorphone (DILAUDID) 4 MG tablet Take 1-1.5 tablets (4-6 mg) by mouth every 3 hours as needed for moderate to severe pain 170 tablet 0     LORazepam (ATIVAN) 0.5 MG tablet Take 1-2 tablets (0.5-1 mg) by mouth every 6 hours as needed for anxiety or other (nausea) 40 tablet 0     ondansetron (ZOFRAN-ODT) 8 MG ODT tab Take 1 tablet (8 mg) by mouth every 8 hours as needed for nausea or vomiting 60 tablet 0     Acetaminophen (TYLENOL PO) Take 1,000 mg by mouth every 8 hours as needed for mild pain or fever       venlafaxine (EFFEXOR-ER) 150 MG TB24 24 hr tablet Take 1 tablet (150 mg) by mouth daily (with breakfast) 30 each 0     rOPINIRole (REQUIP) 0.25 MG tablet Take 1 tablet (0.25 mg) by mouth At Bedtime 30 tablet 11     Cholecalciferol (VITAMIN D3) 3000 UNITS TABS Take 3,000 Int'l Units by mouth daily 30 tablet 3     order for DME Equipment being ordered: x2 Wearease compression shirt (Patient not taking: Reported on 3/15/2018) 1 each 0     order for DME Equipment being ordered: 20-30mmHg compression sleeve and 20-30mmHg compression glove\" x2 pair (Patient not taking: Reported on 3/15/2018) 1 Units 0        Physical Exam:  /74 (BP Location: Right arm, Patient Position: Sitting, Cuff Size: Adult Large)  Pulse 73  Temp 99.1  F (37.3  C) (Tympanic)  Resp 16  Ht 1.6 m (5' 3\")  Wt 95.6 kg (210 lb 11.2 oz)  SpO2 96%  Breastfeeding? No  BMI 37.32 kg/m2  Wt Readings from Last 12 Encounters:   03/15/18 95.6 kg (210 lb 11.2 oz)   03/10/18 98.3 kg (216 lb 12.8 oz)   03/04/18 96.5 kg (212 lb 11.9 oz)   03/03/18 " 96.6 kg (213 lb)   02/22/18 97.7 kg (215 lb 4.8 oz)   02/22/18 95.7 kg (211 lb)   02/15/18 98 kg (216 lb 1.6 oz)   01/18/18 97.7 kg (215 lb 6.4 oz)   12/07/17 95.5 kg (210 lb 9.6 oz)   11/24/17 94.3 kg (207 lb 12.8 oz)   11/22/17 93.9 kg (207 lb)   11/14/17 95.9 kg (211 lb 6.7 oz)     ECOG performance status: 1  GENERAL APPEARANCE: Healthy, alert and in no acute distress.  HEENT: Sclerae anicteric. PERRLA. Oropharynx without ulcers, lesions, or thrush.  NECK: Supple. No asymmetry or masses.  LYMPHATICS: No palpable cervical, supraclavicular, axillary, or inguinal lymphadenopathy.  RESP: Lungs clear to auscultation bilaterally without rales, rhonchi or wheezes.  CARDIOVASCULAR: Regular rate and rhythm. Normal S1, S2; no S3 or S4. No murmur, gallop, or rub.  ABDOMEN: Soft, nontender. Bowel sounds normal. No palpable organomegaly or masses.  MUSCULOSKELETAL: Extremities without gross deformities noted. No edema of bilateral lower extremities.  SKIN: No suspicious lesions or rashes.  NEURO: Alert and oriented x 3. Cranial nerves II-XII grossly intact.  PSYCHIATRIC: Mentation and affect appear normal.    Laboratory/Imaging Studies:  Orders Only on 03/04/2018   Component Date Value Ref Range Status     Specimen Description 03/04/2018 Feces   Final     C Diff Toxin B PCR 03/04/2018 Negative  NEG^Negative Final    Comment: Negative: Clostridium difficile target DNA sequences NOT detected, presumed   negative for Clostridium difficile toxin B or the number of bacteria present   may be below the limit of detection for the test.  FDA approved assay performed using [employer] GeneXpert real-time PCR.  A negative result does not exclude actual disease due to Clostridium difficile   and may be due to improper collection, handling and storage of the specimen   or the number of organisms in the specimen is below the detection limit of the   assay.          Recent Results (from the past 744 hour(s))   CT Abdomen Pelvis w Contrast     Narrative    CT ABDOMEN AND PELVIS WITH CONTRAST 3/4/2018 12:16 PM     HISTORY: Bloody diarrhea/abdominal pain, history of melanoma, axilla  surgery in October 2017.       COMPARISON: PET/CT 10/11/2017.    TECHNIQUE: Axial images from the lung bases to the symphysis are  performed with additional coronal reformatted images. 100 mL of Isovue  370 are given intravenously.  Radiation dose for this scan was reduced  using automated exposure control, adjustment of the mA and/or kV  according to patient size, or iterative reconstruction technique.    FINDINGS:   The lung bases are clear.    Abdomen: Tiny cyst measuring less than 1 cm is present in the left  hepatic dome. This was present on a prior contrast-enhanced CT from  2012 and is therefore considered a benign cyst. The liver is otherwise  unremarkable. The spleen, gallbladder, fatty replaced pancreas,  adrenal glands, and kidneys are unremarkable. No hydronephrosis. No  enlarged lymph nodes. The bowel is normal in caliber without  obstruction or diverticulitis. Appendix is normal.    Pelvis: The bladder, uterus, and left ovary are unremarkable. Probable  dominant follicle right ovary is noted. No enlarged pelvic or inguinal  lymph nodes. There is colonic wall thickening and mucosal enhancement  involving the rectum and distal sigmoid colon with surrounding  perirectal adenopathy. In the surrounding mesorectal fat, small pelvic  sidewall lymph nodes are also noted. Findings could reflect an  infectious coloproctitis versus an inflammatory bowel disease such as  ulcerative colitis. Bone window examination is unremarkable.      Impression    IMPRESSION:  1. Probable infectious or inflammatory proctocolitis as described.  Surrounding mesorectal lymph nodes raise the possibility of a more  chronic inflammatory process such as ulcerative colitis. Underlying  mass should likely be excluded with follow-up colonoscopy if not  previously performed.  2. Simple cyst left  hepatic lobe unchanged dating back to at least  2012. No further follow-up required. Abdominal and pelvic organs are  otherwise within normal limits.    ALEX RIVERA MD   US leg bilateral venous    Narrative    VENOUS ULTRASOUND BOTH LEGS  3/6/2018 8:36 PM     HISTORY: rule out DVT;     COMPARISON: None.    FINDINGS: Examination of the deep veins with graded compression and  color flow Doppler with spectral wave form analysis shows  no evidence  of thrombus in the common femoral vein, femoral vein, popliteal vein  or calf veins.  There is no venous insufficiency.      Impression    IMPRESSION: No DVT is identified in either lower extremity    JD HAYES MD       Assessment and plan:    (C79.9) Metastatic malignant melanoma (H)  (primary encounter diagnosis)  Immunotherapy with Opdivo will be held for now.  I will see the patient again in 1 week or sooner if there are new developments or concerns.    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      The patient is ready to learn, no apparent learning barriers were identified.  Diagnosis and treatment plans were explained to the patient. The patient expressed understanding of the content. The patient asked appropriate questions. The patient questions were answered to her satisfaction.    Chart documentation with Dragon Voice recognition Software. Although reviewed after completion, some words and grammatical errors may remain.    Again, thank you for allowing me to participate in the care of your patient.        Sincerely,        Kathy Richards MD

## 2018-03-15 NOTE — MR AVS SNAPSHOT
After Visit Summary   3/15/2018    Doretha Fernandez    MRN: [id number]           Patient Information     Date Of Birth          1975        Visit Information        Provider Department      3/15/2018 9:00 AM Kathy Richards MD [facility] ONCOLOGY      Today's Diagnoses     Metastatic malignant melanoma (H)    -  1    Proctocolitis          Care Instructions    No Opdivo infusions at this time, no IV fluids at this time. We would like to see you back in clinic with Dr. Richards on Thursday next week. You will not need lab draw prior.      Please call our office on Monday with an update on the diarrhea.  Depending on how you're doing we may continue the same dose (100 mg daily) or decrease it to 50 mg daily.    Your prescription (prednisone) has been sent to:   [facility] [address]  [address] [address]  Phone: [phone] Fax: [phone] Alternate Fax: [phone], [phone]  When you are in need of a refill, please call your pharmacy and they will send us a request.      Copy of appointments, and after visit summary (AVS) given to patient.  If you have any questions during business hours (M-F 8 AM- 4PM), please call Jeanie Keenan RN, BSN, OCN Oncology Hematology /Breast Cancer Navigator at [facility] [phone].   For questions after business hours, or on holidays/weekends, please call our after hours Nurse Triage line [phone]. Thank you.            Follow-ups after your visit        Follow-up notes from your care team     Return in about 1 week (around 3/22/2018) for no labs prior.      Your next 10 appointments already scheduled     Mar 21, 2018 11:30 AM CDT   Return Visit with Kathy Richards MD   [facility] ([facility]    [facility]  [address]  [facility] [address] [phone]            Apr 09, 2018  " 2:30 PM CDT   Return Visit with Lowell Bullock MD   [facility]    [address] [address] [phone]              Who to contact     If you have questions or need follow up information about today's clinic visit or your schedule please contact [facility] directly at [phone].  Normal or non-critical lab and imaging results will be communicated to you by [employer], letter or phone within 4 business days after the clinic has received the results. If you do not hear from us within 7 days, please contact the clinic through [employer] or phone. If you have a critical or abnormal lab result, we will notify you by phone as soon as possible.  Submit refill requests through [employer] or call your pharmacy and they will forward the refill request to us. Please allow 3 business days for your refill to be completed.          Additional Information About Your Visit        [employer]on Information     [employer] gives you secure access to your electronic health record. If you see a primary care provider, you can also send messages to your care team and make appointments. If you have questions, please call your primary care clinic.  If you do not have a primary care provider, please call [phone] and they will assist you.        Care EveryWhere ID     This is your Care EveryWhere ID. This could be used by other organizations to access your [place] medical records  [id number]        Your Vitals Were     Pulse Temperature Respirations Height Pulse Oximetry Breastfeeding?    73 99.1  F (37.3  C) (Tympanic) 16 1.6 m (5' 3\") 96% No    BMI (Body Mass Index)                   37.32 kg/m2            Blood Pressure from Last 3 Encounters:   03/15/18 112/74   03/10/18 96/52   03/08/18 115/70    Weight from Last 3 Encounters:   03/15/18 95.6 kg (210 lb 11.2 oz)   03/10/18 98.3 kg (216 lb 12.8 oz)   03/04/18 96.5 kg (212 lb 11.9 oz)              We Performed " the Following     CBC with platelets differential     Comprehensive metabolic panel     Magnesium          Where to get your medicines      These medications were sent to [facility] [place] [facility] [place], [place] - [address]  [address] [place], [place] [address]     Phone:  [phone]     predniSONE 50 MG tablet          Primary Care Provider Office Phone # Fax #    Anna Naidu [phone] [address] [facility] [address]        Equal Access to Services     BRIGETTE QUARLES : Hadii aad ku hadasho Soomaali, waaxda luqadaha, qaybta kaalmada adeegyada, waxay idiin hayaan adeeg jazzarafernando la'colin . [facility] [phone].    ATENCIÓN: Si habla español, tiene a mcdonnell disposición servicios gratuitos de asistencia lingüística. [facility] [phone].    We comply with applicable federal civil rights laws and [place] laws. We do not discriminate on the basis of race, color, national origin, age, disability, sex, sexual orientation, or gender identity.            Thank you!     Thank you for choosing [facility]  for your care. Our goal is always to provide you with excellent care. Hearing back from our patients is one way we can continue to improve our services. Please take a few minutes to complete the written survey that you may receive in the mail after your visit with us. Thank you!             Your Updated Medication List - Protect others around you: Learn how to safely use, store and throw away your medicines at www.disposemymeds.org.          This list is accurate as of 3/15/18 10:14 AM.  Always use your most recent med list.                   Brand Name Dispense Instructions for use Diagnosis    HYDROmorphone 4 MG tablet    DILAUDID    170 tablet    Take 1-1.5 tablets (4-6 mg) by mouth every 3 hours as needed for moderate to severe pain    Proctocolitis       LORazepam 0.5 MG tablet    ATIVAN    40 tablet    Take 1-2 tablets (0.5-1 mg) by mouth every 6 hours as needed for  "anxiety or other (nausea)    Proctocolitis, Anxiety state, Metastatic malignant melanoma (H)       metroNIDAZOLE 500 MG tablet    FLAGYL    24 tablet    Take 1 tablet (500 mg) by mouth every 8 hours for 8 days    Intestinal giardiasis       ondansetron 8 MG ODT tab    ZOFRAN-ODT    60 tablet    Take 1 tablet (8 mg) by mouth every 8 hours as needed for nausea or vomiting    Proctocolitis, Metastatic malignant melanoma (H)       * order for DME     1 Units    Equipment being ordered: 20-30mmHg compression sleeve and 20-30mmHg compression glove\" x2 pair    Lymphedema of left arm       * order for DME     1 each    Equipment being ordered: x2 Wearease compression shirt    Secondary lymphedema       predniSONE 50 MG tablet    DELTASONE    15 tablet    Take 100mg PO qAM with breakfast x 5 days starting on Sun 3/11, then decrease to 50mg PO qAM. Then taper as instructed by your oncologist.    Proctocolitis       rOPINIRole 0.25 MG tablet    REQUIP    30 tablet    Take 1 tablet (0.25 mg) by mouth At Bedtime    Restless leg syndrome       TYLENOL PO      Take 1,000 mg by mouth every 8 hours as needed for mild pain or fever        venlafaxine 150 MG Tb24 24 hr tablet    EFFEXOR-ER    30 each    Take 1 tablet (150 mg) by mouth daily (with breakfast)    Mild major depression (H), Diarrhea       Vitamin D3 3000 UNITS Tabs     30 tablet    Take 3,000 Int'l Units by mouth daily    Vitamin D deficiency       * Notice:  This list has 2 medication(s) that are the same as other medications prescribed for you. Read the directions carefully, and ask your doctor or other care provider to review them with you.      "

## 2018-03-20 NOTE — PROGRESS NOTES
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  She started prednisone 100 mg orally daily with improvement of her diarrhea.  She denies any nausea or vomiting.  She denies any fever or chills.    Review Of Systems:  Constitutional: Negative for fever, chills, and night sweats.  Skin: negative.  Eyes: negative.  Ears/Nose/Throat: negative.  Respiratory: No shortness of breath, dyspnea on exertion, cough, or hemoptysis.  Cardiovascular: negative.  Gastrointestinal: negative.  Genitourinary: negative.  Musculoskeletal: negative.  Neurologic: negative.  Psychiatric: negative.  Hematologic/Lymphatic/Immunologic: negative.  Endocrine: negative.    All other ROS negative unless mentioned in interval history.    Past medical, social, surgical, and family histories reviewed.    Allergies:  Allergies as of 03/15/2018 - Nicola as Reviewed 03/15/2018   Allergen Reaction Noted     Compazine [prochlorperazine] Fatigue 10/12/2017     Fentanyl Other (See Comments) 10/12/2017     Erythrocin Nausea and Vomiting 03/20/2008     Zithromax [azithromycin dihydrate] Diarrhea 02/17/2012       Current Medications:  Current Outpatient Prescriptions   Medication Sig Dispense Refill     predniSONE (DELTASONE) 50 MG tablet Take 100mg PO qAM with breakfast x 5 days starting on Sun 3/11, then decrease to 50mg PO qAM. Then taper as instructed by your oncologist. 15 tablet 0     HYDROmorphone (DILAUDID) 4 MG tablet Take 1-1.5 tablets (4-6 mg) by mouth every 3 hours as needed for moderate to severe pain 170 tablet 0     LORazepam (ATIVAN) 0.5 MG tablet Take 1-2 tablets (0.5-1 mg) by mouth every 6 hours as needed for anxiety or other (nausea) 40 tablet 0  "    ondansetron (ZOFRAN-ODT) 8 MG ODT tab Take 1 tablet (8 mg) by mouth every 8 hours as needed for nausea or vomiting 60 tablet 0     Acetaminophen (TYLENOL PO) Take 1,000 mg by mouth every 8 hours as needed for mild pain or fever       venlafaxine (EFFEXOR-ER) 150 MG TB24 24 hr tablet Take 1 tablet (150 mg) by mouth daily (with breakfast) 30 each 0     rOPINIRole (REQUIP) 0.25 MG tablet Take 1 tablet (0.25 mg) by mouth At Bedtime 30 tablet 11     Cholecalciferol (VITAMIN D3) 3000 UNITS TABS Take 3,000 Int'l Units by mouth daily 30 tablet 3     order for DME Equipment being ordered: x2 Wearease compression shirt (Patient not taking: Reported on 3/15/2018) 1 each 0     order for DME Equipment being ordered: 20-30mmHg compression sleeve and 20-30mmHg compression glove\" x2 pair (Patient not taking: Reported on 3/15/2018) 1 Units 0        Physical Exam:  /74 (BP Location: Right arm, Patient Position: Sitting, Cuff Size: Adult Large)  Pulse 73  Temp 99.1  F (37.3  C) (Tympanic)  Resp 16  Ht 1.6 m (5' 3\")  Wt 95.6 kg (210 lb 11.2 oz)  SpO2 96%  Breastfeeding? No  BMI 37.32 kg/m2  Wt Readings from Last 12 Encounters:   03/15/18 95.6 kg (210 lb 11.2 oz)   03/10/18 98.3 kg (216 lb 12.8 oz)   03/04/18 96.5 kg (212 lb 11.9 oz)   03/03/18 96.6 kg (213 lb)   02/22/18 97.7 kg (215 lb 4.8 oz)   02/22/18 95.7 kg (211 lb)   02/15/18 98 kg (216 lb 1.6 oz)   01/18/18 97.7 kg (215 lb 6.4 oz)   12/07/17 95.5 kg (210 lb 9.6 oz)   11/24/17 94.3 kg (207 lb 12.8 oz)   11/22/17 93.9 kg (207 lb)   11/14/17 95.9 kg (211 lb 6.7 oz)     ECOG performance status: 1  GENERAL APPEARANCE: Healthy, alert and in no acute distress.  HEENT: Sclerae anicteric. PERRLA. Oropharynx without ulcers, lesions, or thrush.  NECK: Supple. No asymmetry or masses.  LYMPHATICS: No palpable cervical, supraclavicular, axillary, or inguinal lymphadenopathy.  RESP: Lungs clear to auscultation bilaterally without rales, rhonchi or wheezes.  CARDIOVASCULAR: " Regular rate and rhythm. Normal S1, S2; no S3 or S4. No murmur, gallop, or rub.  ABDOMEN: Soft, nontender. Bowel sounds normal. No palpable organomegaly or masses.  MUSCULOSKELETAL: Extremities without gross deformities noted. No edema of bilateral lower extremities.  SKIN: No suspicious lesions or rashes.  NEURO: Alert and oriented x 3. Cranial nerves II-XII grossly intact.  PSYCHIATRIC: Mentation and affect appear normal.    Laboratory/Imaging Studies:  Orders Only on 03/04/2018   Component Date Value Ref Range Status     Specimen Description 03/04/2018 Feces   Final     C Diff Toxin B PCR 03/04/2018 Negative  NEG^Negative Final    Comment: Negative: Clostridium difficile target DNA sequences NOT detected, presumed   negative for Clostridium difficile toxin B or the number of bacteria present   may be below the limit of detection for the test.  FDA approved assay performed using Ceram Hyd GeneXpert real-time PCR.  A negative result does not exclude actual disease due to Clostridium difficile   and may be due to improper collection, handling and storage of the specimen   or the number of organisms in the specimen is below the detection limit of the   assay.          Recent Results (from the past 744 hour(s))   CT Abdomen Pelvis w Contrast    Narrative    CT ABDOMEN AND PELVIS WITH CONTRAST 3/4/2018 12:16 PM     HISTORY: Bloody diarrhea/abdominal pain, history of melanoma, axilla  surgery in October 2017.       COMPARISON: PET/CT 10/11/2017.    TECHNIQUE: Axial images from the lung bases to the symphysis are  performed with additional coronal reformatted images. 100 mL of Isovue  370 are given intravenously.  Radiation dose for this scan was reduced  using automated exposure control, adjustment of the mA and/or kV  according to patient size, or iterative reconstruction technique.    FINDINGS:   The lung bases are clear.    Abdomen: Tiny cyst measuring less than 1 cm is present in the left  hepatic dome. This was  present on a prior contrast-enhanced CT from  2012 and is therefore considered a benign cyst. The liver is otherwise  unremarkable. The spleen, gallbladder, fatty replaced pancreas,  adrenal glands, and kidneys are unremarkable. No hydronephrosis. No  enlarged lymph nodes. The bowel is normal in caliber without  obstruction or diverticulitis. Appendix is normal.    Pelvis: The bladder, uterus, and left ovary are unremarkable. Probable  dominant follicle right ovary is noted. No enlarged pelvic or inguinal  lymph nodes. There is colonic wall thickening and mucosal enhancement  involving the rectum and distal sigmoid colon with surrounding  perirectal adenopathy. In the surrounding mesorectal fat, small pelvic  sidewall lymph nodes are also noted. Findings could reflect an  infectious coloproctitis versus an inflammatory bowel disease such as  ulcerative colitis. Bone window examination is unremarkable.      Impression    IMPRESSION:  1. Probable infectious or inflammatory proctocolitis as described.  Surrounding mesorectal lymph nodes raise the possibility of a more  chronic inflammatory process such as ulcerative colitis. Underlying  mass should likely be excluded with follow-up colonoscopy if not  previously performed.  2. Simple cyst left hepatic lobe unchanged dating back to at least  2012. No further follow-up required. Abdominal and pelvic organs are  otherwise within normal limits.    ALEX RIVERA MD   US leg bilateral venous    Narrative    VENOUS ULTRASOUND BOTH LEGS  3/6/2018 8:36 PM     HISTORY: rule out DVT;     COMPARISON: None.    FINDINGS: Examination of the deep veins with graded compression and  color flow Doppler with spectral wave form analysis shows  no evidence  of thrombus in the common femoral vein, femoral vein, popliteal vein  or calf veins.  There is no venous insufficiency.      Impression    IMPRESSION: No DVT is identified in either lower extremity    JD HAYES MD       Assessment and  plan:    (C79.9) Metastatic malignant melanoma (H)  (primary encounter diagnosis)  Immunotherapy with Opdivo will be held for now.  I will see the patient again in 1 week or sooner if there are new developments or concerns.    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      The patient is ready to learn, no apparent learning barriers were identified.  Diagnosis and treatment plans were explained to the patient. The patient expressed understanding of the content. The patient asked appropriate questions. The patient questions were answered to her satisfaction.    Chart documentation with Dragon Voice recognition Software. Although reviewed after completion, some words and grammatical errors may remain.

## 2018-03-20 NOTE — PROGRESS NOTES
Spoke with patient, she is still experiencing the pustulant, bloody diarrhea. Denies fever nor chills. Does feel she is experiencing allergies or a cold and has started to take Zyrtec.    Per Dr. Richards's instruction, because the diarrhea is still present, patient will continue on the 100 mg prednisone daily until her appt with him Thursday.    Pt states understanding and will call with further questions/concerns. Direct line provided.

## 2018-03-21 ENCOUNTER — ONCOLOGY VISIT (OUTPATIENT)
Dept: ONCOLOGY | Facility: CLINIC | Age: 43
End: 2018-03-21
Attending: INTERNAL MEDICINE
Payer: COMMERCIAL

## 2018-03-21 ENCOUNTER — HOSPITAL ENCOUNTER (OUTPATIENT)
Dept: LAB | Facility: CLINIC | Age: 43
Discharge: HOME | End: 2018-03-21
Attending: INTERNAL MEDICINE | Admitting: INTERNAL MEDICINE
Payer: COMMERCIAL

## 2018-03-21 VITALS
WEIGHT: 214.8 LBS | BODY MASS INDEX: 38.06 KG/M2 | OXYGEN SATURATION: 94 % | RESPIRATION RATE: 16 BRPM | TEMPERATURE: 97.7 F | HEART RATE: 85 BPM | DIASTOLIC BLOOD PRESSURE: 65 MMHG | HEIGHT: 63 IN | SYSTOLIC BLOOD PRESSURE: 120 MMHG

## 2018-03-21 DIAGNOSIS — C43.62 MALIGNANT MELANOMA OF LEFT UPPER EXTREMITY INCLUDING SHOULDER (H): ICD-10-CM

## 2018-03-21 DIAGNOSIS — K52.9 COLITIS: Primary | ICD-10-CM

## 2018-03-21 LAB
ALBUMIN SERPL-MCNC: 3.3 G/DL (ref 3.4–5)
ALP SERPL-CCNC: 59 U/L (ref 40–150)
ALT SERPL W P-5'-P-CCNC: 37 U/L (ref 0–50)
ANION GAP SERPL CALCULATED.3IONS-SCNC: 10 MMOL/L (ref 3–14)
AST SERPL W P-5'-P-CCNC: 18 U/L (ref 0–45)
BASOPHILS # BLD AUTO: 0 10E9/L (ref 0–0.2)
BASOPHILS NFR BLD AUTO: 0.1 %
BILIRUB SERPL-MCNC: 0.2 MG/DL (ref 0.2–1.3)
BUN SERPL-MCNC: 15 MG/DL (ref 7–30)
CALCIUM SERPL-MCNC: 8.2 MG/DL (ref 8.5–10.1)
CHLORIDE SERPL-SCNC: 105 MMOL/L (ref 94–109)
CO2 SERPL-SCNC: 23 MMOL/L (ref 20–32)
CREAT SERPL-MCNC: 0.63 MG/DL (ref 0.52–1.04)
DIFFERENTIAL METHOD BLD: ABNORMAL
EOSINOPHIL # BLD AUTO: 0 10E9/L (ref 0–0.7)
EOSINOPHIL NFR BLD AUTO: 0 %
ERYTHROCYTE [DISTWIDTH] IN BLOOD BY AUTOMATED COUNT: 14.5 % (ref 10–15)
GFR SERPL CREATININE-BSD FRML MDRD: >90 ML/MIN/1.7M2
GLUCOSE SERPL-MCNC: 178 MG/DL (ref 70–99)
HCT VFR BLD AUTO: 38.4 % (ref 35–47)
HGB BLD-MCNC: 12.5 G/DL (ref 11.7–15.7)
IMM GRANULOCYTES # BLD: 0.2 10E9/L (ref 0–0.4)
IMM GRANULOCYTES NFR BLD: 1.1 %
LYMPHOCYTES # BLD AUTO: 1.2 10E9/L (ref 0.8–5.3)
LYMPHOCYTES NFR BLD AUTO: 6.1 %
MAGNESIUM SERPL-MCNC: 2.3 MG/DL (ref 1.6–2.3)
MCH RBC QN AUTO: 28.3 PG (ref 26.5–33)
MCHC RBC AUTO-ENTMCNC: 32.6 G/DL (ref 31.5–36.5)
MCV RBC AUTO: 87 FL (ref 78–100)
MONOCYTES # BLD AUTO: 0.2 10E9/L (ref 0–1.3)
MONOCYTES NFR BLD AUTO: 1.1 %
NEUTROPHILS # BLD AUTO: 18.3 10E9/L (ref 1.6–8.3)
NEUTROPHILS NFR BLD AUTO: 91.6 %
PLATELET # BLD AUTO: 389 10E9/L (ref 150–450)
POTASSIUM SERPL-SCNC: 3.7 MMOL/L (ref 3.4–5.3)
PROT SERPL-MCNC: 7.1 G/DL (ref 6.8–8.8)
RBC # BLD AUTO: 4.42 10E12/L (ref 3.8–5.2)
SODIUM SERPL-SCNC: 138 MMOL/L (ref 133–144)
WBC # BLD AUTO: 19.9 10E9/L (ref 4–11)

## 2018-03-21 PROCEDURE — 80053 COMPREHEN METABOLIC PANEL: CPT | Performed by: INTERNAL MEDICINE

## 2018-03-21 PROCEDURE — G0463 HOSPITAL OUTPT CLINIC VISIT: HCPCS

## 2018-03-21 PROCEDURE — 83735 ASSAY OF MAGNESIUM: CPT | Performed by: INTERNAL MEDICINE

## 2018-03-21 PROCEDURE — 36415 COLL VENOUS BLD VENIPUNCTURE: CPT | Performed by: INTERNAL MEDICINE

## 2018-03-21 PROCEDURE — 85025 COMPLETE CBC W/AUTO DIFF WBC: CPT | Performed by: INTERNAL MEDICINE

## 2018-03-21 PROCEDURE — 99214 OFFICE O/P EST MOD 30 MIN: CPT | Performed by: INTERNAL MEDICINE

## 2018-03-21 ASSESSMENT — PAIN SCALES - GENERAL: PAINLEVEL: NO PAIN (0)

## 2018-03-21 NOTE — MR AVS SNAPSHOT
After Visit Summary   3/21/2018    Doretha Fernandez    MRN: [id number]           Patient Information     Date Of Birth          1975        Visit Information        Provider Department      3/21/2018 11:30 AM Kathy Richards MD [facility] [place] [facility] ONCOLOGY      Today's Diagnoses     Colitis    -  1      Care Instructions    We would like to see you back in clinic with Dr. Richards in 1 week, with standing weekly labs.  Your prescription has been sent to:   [facility] [place], [place] - [address]  [address] [address]  Phone: [phone] Fax: [phone] Alternate Fax: [phone], [phone]   When you are in need of a refill, please call your pharmacy and they will send us a request.  Copy of appointments, and after visit summary (AVS) given to patient.  If you have any questions during business hours (M-F 8 AM- 4PM), please call Jeanie Keenan RN, BSN, OCN Oncology Hematology /Breast Cancer Navigator at [facility] [phone].   For questions after business hours, or on holidays/weekends, please call our after hours Nurse Triage line [phone]. Thank you.            Follow-ups after your visit        Follow-up notes from your care team     Return in about 1 week (around 3/28/2018).      Your next 10 appointments already scheduled     Mar 21, 2018 11:50 AM CDT   LAB with [facility] ([facility])    [address] [address] [phone]           Please do not eat 10-12 hours before your appointment if you are coming in fasting for labs on lipids, cholesterol, or glucose (sugar). This does not apply to pregnant women. Water, hot tea and black coffee (with nothing added) are okay. Do not drink other fluids, diet soda or chew gum.            Mar 29, 2018  8:40 AM CDT   LAB with [facility] ([facility])     [address] [address]   [phone]           Please do not eat 10-12 hours before your appointment if you are coming in fasting for labs on lipids, cholesterol, or glucose (sugar). This does not apply to pregnant women. Water, hot tea and black coffee (with nothing added) are okay. Do not drink other fluids, diet soda or chew gum.            Mar 29, 2018  9:45 AM CDT   Return Visit with Kathy Richards MD   [facility] ([facility])    [facility]  [address]  [facility] [address]   [phone]            Apr 09, 2018  2:30 PM CDT   Return Visit with Lowell Bullock MD   [facility]    [address] [address]   [phone]              Future tests that were ordered for you today     Open Standing Orders        Priority Remaining Interval Expires Ordered    CBC with platelets differential Routine 4/4 weekly 3/21/2019 3/21/2018    Comprehensive metabolic panel Routine 4/4 weekly 3/21/2019 3/21/2018    Magnesium Routine 4/4 weekly 3/21/2019 3/21/2018            Who to contact     If you have questions or need follow up information about today's clinic visit or your schedule please contact [facility] directly at [phone].  Normal or non-critical lab and imaging results will be communicated to you by P[employer] Enginehart, letter or phone within 4 business days after the clinic has received the results. If you do not hear from us within 7 days, please contact the clinic through P[employer] Engine[employer] or phone. If you have a critical or abnormal lab result, we will notify you by phone as soon as possible.  Submit refill requests through Talko or call your pharmacy and they will forward the refill request to us. Please allow 3 business days for your refill to be completed.          Additional Information About Your Visit        Pricing EngineharFramebench Information     Talko gives you secure  "access to your electronic health record. If you see a primary care provider, you can also send messages to your care team and make appointments. If you have questions, please call your primary care clinic.  If you do not have a primary care provider, please call [phone] and they will assist you.        Care EveryWhere ID     This is your Care EveryWhere ID. This could be used by other organizations to access your [place] medical records  [id number]        Your Vitals Were     Pulse Temperature Respirations Height Pulse Oximetry Breastfeeding?    85 97.7  F (36.5  C) (Oral) 16 1.6 m (5' 2.99\") 94% No    BMI (Body Mass Index)                   38.06 kg/m2            Blood Pressure from Last 3 Encounters:   03/21/18 120/65   03/15/18 112/74   03/10/18 96/52    Weight from Last 3 Encounters:   03/21/18 97.4 kg (214 lb 12.8 oz)   03/15/18 95.6 kg (210 lb 11.2 oz)   03/10/18 98.3 kg (216 lb 12.8 oz)               Primary Care Provider Office Phone # Fax #    Anna Naidu [phone] [address] [address]        Equal Access to Services     BRIGETTE QUARLES : Hadii aad ku hadasho Soomaali, waaxda luqadaha, qaybta kaalmada adeegyada, waxay idiin hayzeyadn liliam herrera lakay ah. So [facility] [phone].    ATENCIÓN: Si habla español, tiene a mcdonnell disposición servicios gratuitos de asistencia lingüística. Llame al [phone].    We comply with applicable federal civil rights laws and [place] laws. We do not discriminate on the basis of race, color, national origin, age, disability, sex, sexual orientation, or gender identity.            Thank you!     Thank you for choosing [facility]  for your care. Our goal is always to provide you with excellent care. Hearing back from our patients is one way we can continue to improve our services. Please take a few minutes to complete the written survey that you may receive in the mail after your visit with us. Thank you!           " "  Your Updated Medication List - Protect others around you: Learn how to safely use, store and throw away your medicines at www.disposemymeds.org.          This list is accurate as of 3/21/18 11:45 AM.  Always use your most recent med list.                   Brand Name Dispense Instructions for use Diagnosis    HYDROmorphone 4 MG tablet    DILAUDID    170 tablet    Take 1-1.5 tablets (4-6 mg) by mouth every 3 hours as needed for moderate to severe pain    Proctocolitis       LORazepam 0.5 MG tablet    ATIVAN    40 tablet    Take 1-2 tablets (0.5-1 mg) by mouth every 6 hours as needed for anxiety or other (nausea)    Proctocolitis, Anxiety state, Metastatic malignant melanoma (H)       ondansetron 8 MG ODT tab    ZOFRAN-ODT    60 tablet    Take 1 tablet (8 mg) by mouth every 8 hours as needed for nausea or vomiting    Proctocolitis, Metastatic malignant melanoma (H)       * order for DME     1 Units    Equipment being ordered: 20-30mmHg compression sleeve and 20-30mmHg compression glove\" x2 pair    Lymphedema of left arm       * order for DME     1 each    Equipment being ordered: x2 Wearease compression shirt    Secondary lymphedema       predniSONE 50 MG tablet    DELTASONE    15 tablet    Take 100mg PO qAM with breakfast x 5 days starting on Sun 3/11, then decrease to 50mg PO qAM. Then taper as instructed by your oncologist.    Proctocolitis       rOPINIRole 0.25 MG tablet    REQUIP    30 tablet    Take 1 tablet (0.25 mg) by mouth At Bedtime    Restless leg syndrome       TYLENOL PO      Take 1,000 mg by mouth every 8 hours as needed for mild pain or fever        venlafaxine 150 MG Tb24 24 hr tablet    EFFEXOR-ER    30 each    Take 1 tablet (150 mg) by mouth daily (with breakfast)    Mild major depression (H), Diarrhea       Vitamin D3 3000 UNITS Tabs     30 tablet    Take 3,000 Int'l Units by mouth daily    Vitamin D deficiency       * Notice:  This list has 2 medication(s) that are the same as other medications " prescribed for you. Read the directions carefully, and ask your doctor or other care provider to review them with you.

## 2018-03-21 NOTE — NURSING NOTE
"Oncology Rooming Note    March 21, 2018 11:24 AM   Doretha Fernandez is a 43 year old female who presents for:    Chief Complaint   Patient presents with     Oncology Clinic Visit     1 week recheck Metastatic malignant melanoma, no labs      Initial Vitals: /65 (BP Location: Right arm, Patient Position: Sitting, Cuff Size: Adult Large)  Pulse 85  Temp 97.7  F (36.5  C) (Oral)  Resp 16  Ht 1.6 m (5' 2.99\")  Wt 97.4 kg (214 lb 12.8 oz)  SpO2 94%  Breastfeeding? No  BMI 38.06 kg/m2 Estimated body mass index is 38.06 kg/(m^2) as calculated from the following:    Height as of this encounter: 1.6 m (5' 2.99\").    Weight as of this encounter: 97.4 kg (214 lb 12.8 oz). Body surface area is 2.08 meters squared.  No Pain (0) Comment: Data Unavailable   No LMP recorded.  Allergies reviewed: Yes  Medications reviewed: Yes    Medications: Medication refills not needed today.  Pharmacy name entered into [employer]: [facility] [place], [place] - [address] [place] [address]    Clinical concerns:1 week recheck Metastatic malignant melanoma, no labs.     1. Loose BM & Fatigue.  2. Would like a referral to the Cancer Rehab.   3. Would like a referral to Behavioral Health that's covered by her insurance.     8  minutes for nursing intake (face to face time)     Lesa Patel CMA              "

## 2018-03-21 NOTE — LETTER
3/21/2018         RE: [facility]andez  [address] [facility] [address]  [place] [address]        Dear Colleague,    Thank you for referring your patient, Doretha Fernandez, to the [facility]. Please see a copy of my visit note below.    Hematology/ Oncology Follow-up Visit:  Mar 21, 2018    Reason for Visit:   Chief Complaint   Patient presents with     Oncology Clinic Visit     1 week recheck Metastatic malignant melanoma, no labs          Interval History:  Patient returning today for follow-up visit.  She still continued to have diarrhea with mucus and blood per rectum.  She denies any fever or chills.  She continue on prednisone 100 mg orally daily.  She denies any abdominal pain.  She denies any urinary symptoms.    Review Of Systems:  Constitutional: Negative for fever, chills, and night sweats.  Skin: negative.  Eyes: negative.  Ears/Nose/Throat: negative.  Respiratory: No shortness of breath, dyspnea on exertion, cough, or hemoptysis.  Cardiovascular: negative.  Gastrointestinal: Diarrhea as mentioned above  Genitourinary: negative.  Musculoskeletal: negative.  Neurologic: negative.  Psychiatric: negative.  Hematologic/Lymphatic/Immunologic: negative.  Endocrine: negative.    All other ROS negative unless mentioned in interval history.    Past medical, social, surgical, and family histories reviewed.    Allergies:  Allergies as of 03/21/2018 - Nicola as Reviewed 03/21/2018   Allergen Reaction Noted     Compazine [prochlorperazine] Fatigue 10/12/2017     Fentanyl Other (See Comments) 10/12/2017     Erythrocin Nausea and Vomiting 03/20/2008     Zithromax [azithromycin dihydrate] Diarrhea 02/17/2012       Current Medications:  Current Outpatient Prescriptions   Medication Sig Dispense Refill     predniSONE (DELTASONE) 50 MG tablet Take 100mg PO qAM with breakfast x 5 days starting on Sun 3/11, then decrease to 50mg PO qAM. Then taper as instructed by your oncologist. 15 tablet 0     LORazepam (ATIVAN) 0.5 MG  "tablet Take 1-2 tablets (0.5-1 mg) by mouth every 6 hours as needed for anxiety or other (nausea) 40 tablet 0     ondansetron (ZOFRAN-ODT) 8 MG ODT tab Take 1 tablet (8 mg) by mouth every 8 hours as needed for nausea or vomiting 60 tablet 0     Acetaminophen (TYLENOL PO) Take 1,000 mg by mouth every 8 hours as needed for mild pain or fever       venlafaxine (EFFEXOR-ER) 150 MG TB24 24 hr tablet Take 1 tablet (150 mg) by mouth daily (with breakfast) 30 each 0     rOPINIRole (REQUIP) 0.25 MG tablet Take 1 tablet (0.25 mg) by mouth At Bedtime 30 tablet 11     Cholecalciferol (VITAMIN D3) 3000 UNITS TABS Take 3,000 Int'l Units by mouth daily 30 tablet 3     HYDROmorphone (DILAUDID) 4 MG tablet Take 1-1.5 tablets (4-6 mg) by mouth every 3 hours as needed for moderate to severe pain (Patient not taking: Reported on 3/21/2018) 170 tablet 0     order for DME Equipment being ordered: x2 Wearease compression shirt (Patient not taking: Reported on 3/15/2018) 1 each 0     order for DME Equipment being ordered: 20-30mmHg compression sleeve and 20-30mmHg compression glove\" x2 pair (Patient not taking: Reported on 3/15/2018) 1 Units 0        Physical Exam:  /65 (BP Location: Right arm, Patient Position: Sitting, Cuff Size: Adult Large)  Pulse 85  Temp 97.7  F (36.5  C) (Oral)  Resp 16  Ht 1.6 m (5' 2.99\")  Wt 97.4 kg (214 lb 12.8 oz)  SpO2 94%  Breastfeeding? No  BMI 38.06 kg/m2  Wt Readings from Last 12 Encounters:   03/21/18 97.4 kg (214 lb 12.8 oz)   03/15/18 95.6 kg (210 lb 11.2 oz)   03/10/18 98.3 kg (216 lb 12.8 oz)   03/04/18 96.5 kg (212 lb 11.9 oz)   03/03/18 96.6 kg (213 lb)   02/22/18 97.7 kg (215 lb 4.8 oz)   02/22/18 95.7 kg (211 lb)   02/15/18 98 kg (216 lb 1.6 oz)   01/18/18 97.7 kg (215 lb 6.4 oz)   12/07/17 95.5 kg (210 lb 9.6 oz)   11/24/17 94.3 kg (207 lb 12.8 oz)   11/22/17 93.9 kg (207 lb)     ECOG performance status: 1  GENERAL APPEARANCE: Healthy, alert and in no acute distress.  HEENT: Sclerae " anicteric. PERRLA. Oropharynx without ulcers, lesions, or thrush.  NECK: Supple. No asymmetry or masses.  LYMPHATICS: No palpable cervical, supraclavicular, axillary, or inguinal lymphadenopathy.  RESP: Lungs clear to auscultation bilaterally without rales, rhonchi or wheezes.  CARDIOVASCULAR: Regular rate and rhythm. Normal S1, S2; no S3 or S4. No murmur, gallop, or rub.  ABDOMEN: Soft, nontender. Bowel sounds normal. No palpable organomegaly or masses.  MUSCULOSKELETAL: Extremities without gross deformities noted. No edema of bilateral lower extremities.  SKIN: No suspicious lesions or rashes.  NEURO: Alert and oriented x 3. Cranial nerves II-XII grossly intact.  PSYCHIATRIC: Mentation and affect appear normal.    Laboratory/Imaging Studies:  Oncology Visit on 03/15/2018   Component Date Value Ref Range Status     WBC 03/15/2018 12.7* 4.0 - 11.0 10e9/L Final     RBC Count 03/15/2018 4.36  3.8 - 5.2 10e12/L Final     Hemoglobin 03/15/2018 12.3  11.7 - 15.7 g/dL Final     Hematocrit 03/15/2018 37.0  35.0 - 47.0 % Final     MCV 03/15/2018 85  78 - 100 fl Final     MCH 03/15/2018 28.2  26.5 - 33.0 pg Final     MCHC 03/15/2018 33.2  31.5 - 36.5 g/dL Final     RDW 03/15/2018 14.0  10.0 - 15.0 % Final     Platelet Count 03/15/2018 354  150 - 450 10e9/L Final     Diff Method 03/15/2018 Automated Method   Final     % Neutrophils 03/15/2018 61.4  % Final     % Lymphocytes 03/15/2018 29.7  % Final     % Monocytes 03/15/2018 7.3  % Final     % Eosinophils 03/15/2018 0.2  % Final     % Basophils 03/15/2018 0.2  % Final     % Immature Granulocytes 03/15/2018 1.2  % Final     Absolute Neutrophil 03/15/2018 7.8  1.6 - 8.3 10e9/L Final     Absolute Lymphocytes 03/15/2018 3.8  0.8 - 5.3 10e9/L Final     Absolute Monocytes 03/15/2018 0.9  0.0 - 1.3 10e9/L Final     Absolute Eosinophils 03/15/2018 0.0  0.0 - 0.7 10e9/L Final     Absolute Basophils 03/15/2018 0.0  0.0 - 0.2 10e9/L Final     Abs Immature Granulocytes 03/15/2018 0.2  0  - 0.4 10e9/L Final     Sodium 03/15/2018 141  133 - 144 mmol/L Final     Potassium 03/15/2018 3.0* 3.4 - 5.3 mmol/L Final     Chloride 03/15/2018 107  94 - 109 mmol/L Final     Carbon Dioxide 03/15/2018 24  20 - 32 mmol/L Final     Anion Gap 03/15/2018 10  3 - 14 mmol/L Final     Glucose 03/15/2018 116* 70 - 99 mg/dL Final     Urea Nitrogen 03/15/2018 15  7 - 30 mg/dL Final     Creatinine 03/15/2018 0.75  0.52 - 1.04 mg/dL Final     GFR Estimate 03/15/2018 85  >60 mL/min/1.7m2 Final    Non  GFR Calc     GFR Estimate If Black 03/15/2018 >90  >60 mL/min/1.7m2 Final    African American GFR Calc     Calcium 03/15/2018 8.3* 8.5 - 10.1 mg/dL Final     Bilirubin Total 03/15/2018 <0.1* 0.2 - 1.3 mg/dL Final     Albumin 03/15/2018 3.2* 3.4 - 5.0 g/dL Final     Protein Total 03/15/2018 6.8  6.8 - 8.8 g/dL Final     Alkaline Phosphatase 03/15/2018 61  40 - 150 U/L Final     ALT 03/15/2018 62* 0 - 50 U/L Final     AST 03/15/2018 15  0 - 45 U/L Final     Magnesium 03/15/2018 2.0  1.6 - 2.3 mg/dL Final        Recent Results (from the past 744 hour(s))   CT Abdomen Pelvis w Contrast    Narrative    CT ABDOMEN AND PELVIS WITH CONTRAST 3/4/2018 12:16 PM     HISTORY: Bloody diarrhea/abdominal pain, history of melanoma, axilla  surgery in October 2017.       COMPARISON: PET/CT 10/11/2017.    TECHNIQUE: Axial images from the lung bases to the symphysis are  performed with additional coronal reformatted images. 100 mL of Isovue  370 are given intravenously.  Radiation dose for this scan was reduced  using automated exposure control, adjustment of the mA and/or kV  according to patient size, or iterative reconstruction technique.    FINDINGS:   The lung bases are clear.    Abdomen: Tiny cyst measuring less than 1 cm is present in the left  hepatic dome. This was present on a prior contrast-enhanced CT from  2012 and is therefore considered a benign cyst. The liver is otherwise  unremarkable. The spleen, gallbladder, fatty  replaced pancreas,  adrenal glands, and kidneys are unremarkable. No hydronephrosis. No  enlarged lymph nodes. The bowel is normal in caliber without  obstruction or diverticulitis. Appendix is normal.    Pelvis: The bladder, uterus, and left ovary are unremarkable. Probable  dominant follicle right ovary is noted. No enlarged pelvic or inguinal  lymph nodes. There is colonic wall thickening and mucosal enhancement  involving the rectum and distal sigmoid colon with surrounding  perirectal adenopathy. In the surrounding mesorectal fat, small pelvic  sidewall lymph nodes are also noted. Findings could reflect an  infectious coloproctitis versus an inflammatory bowel disease such as  ulcerative colitis. Bone window examination is unremarkable.      Impression    IMPRESSION:  1. Probable infectious or inflammatory proctocolitis as described.  Surrounding mesorectal lymph nodes raise the possibility of a more  chronic inflammatory process such as ulcerative colitis. Underlying  mass should likely be excluded with follow-up colonoscopy if not  previously performed.  2. Simple cyst left hepatic lobe unchanged dating back to at least  2012. No further follow-up required. Abdominal and pelvic organs are  otherwise within normal limits.    ALEX RIVERA MD   US leg bilateral venous    Narrative    VENOUS ULTRASOUND BOTH LEGS  3/6/2018 8:36 PM     HISTORY: rule out DVT;     COMPARISON: None.    FINDINGS: Examination of the deep veins with graded compression and  color flow Doppler with spectral wave form analysis shows  no evidence  of thrombus in the common femoral vein, femoral vein, popliteal vein  or calf veins.  There is no venous insufficiency.      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  She is gradually improving.  We will continue on  prednisone we will reduce her dose today to 50 mg orally daily.  I will see the patient again in 1 week or sooner if there are new developments or concerns.    Plan: CBC with platelets differential, Comprehensive metabolic panel, Magnesium    (C43.62) Malignant melanoma of left upper extremity including shoulder (H)  There is no evidence of recurrence of melanoma.      The patient is ready to learn, no apparent learning barriers were identified.  Diagnosis and treatment plans were explained to the patient. The patient expressed understanding of the content. The patient asked appropriate questions. The patient questions were answered to her satisfaction.    Chart documentation with Dragon Voice recognition Software. Although reviewed after completion, some words and grammatical errors may remain.    Again, thank you for allowing me to participate in the care of your patient.        Sincerely,        Kathy Richards MD

## 2018-03-21 NOTE — PROGRESS NOTES
Hematology/ Oncology Follow-up Visit:  Mar 21, 2018    Reason for Visit:   Chief Complaint   Patient presents with     Oncology Clinic Visit     1 week recheck Metastatic malignant melanoma, no labs          Interval History:  Patient returning today for follow-up visit.  She still continued to have diarrhea with mucus and blood per rectum.  She denies any fever or chills.  She continue on prednisone 100 mg orally daily.  She denies any abdominal pain.  She denies any urinary symptoms.    Review Of Systems:  Constitutional: Negative for fever, chills, and night sweats.  Skin: negative.  Eyes: negative.  Ears/Nose/Throat: negative.  Respiratory: No shortness of breath, dyspnea on exertion, cough, or hemoptysis.  Cardiovascular: negative.  Gastrointestinal: Diarrhea as mentioned above  Genitourinary: negative.  Musculoskeletal: negative.  Neurologic: negative.  Psychiatric: negative.  Hematologic/Lymphatic/Immunologic: negative.  Endocrine: negative.    All other ROS negative unless mentioned in interval history.    Past medical, social, surgical, and family histories reviewed.    Allergies:  Allergies as of 03/21/2018 - Nicola as Reviewed 03/21/2018   Allergen Reaction Noted     Compazine [prochlorperazine] Fatigue 10/12/2017     Fentanyl Other (See Comments) 10/12/2017     Erythrocin Nausea and Vomiting 03/20/2008     Zithromax [azithromycin dihydrate] Diarrhea 02/17/2012       Current Medications:  Current Outpatient Prescriptions   Medication Sig Dispense Refill     predniSONE (DELTASONE) 50 MG tablet Take 100mg PO qAM with breakfast x 5 days starting on Sun 3/11, then decrease to 50mg PO qAM. Then taper as instructed by your oncologist. 15 tablet 0     LORazepam (ATIVAN) 0.5 MG tablet Take 1-2 tablets (0.5-1 mg) by mouth every 6 hours as needed for anxiety or other (nausea) 40 tablet 0     ondansetron (ZOFRAN-ODT) 8 MG ODT tab Take 1 tablet (8 mg) by mouth every 8 hours as needed for nausea or vomiting 60 tablet 0  "    Acetaminophen (TYLENOL PO) Take 1,000 mg by mouth every 8 hours as needed for mild pain or fever       venlafaxine (EFFEXOR-ER) 150 MG TB24 24 hr tablet Take 1 tablet (150 mg) by mouth daily (with breakfast) 30 each 0     rOPINIRole (REQUIP) 0.25 MG tablet Take 1 tablet (0.25 mg) by mouth At Bedtime 30 tablet 11     Cholecalciferol (VITAMIN D3) 3000 UNITS TABS Take 3,000 Int'l Units by mouth daily 30 tablet 3     HYDROmorphone (DILAUDID) 4 MG tablet Take 1-1.5 tablets (4-6 mg) by mouth every 3 hours as needed for moderate to severe pain (Patient not taking: Reported on 3/21/2018) 170 tablet 0     order for DME Equipment being ordered: x2 Wearease compression shirt (Patient not taking: Reported on 3/15/2018) 1 each 0     order for DME Equipment being ordered: 20-30mmHg compression sleeve and 20-30mmHg compression glove\" x2 pair (Patient not taking: Reported on 3/15/2018) 1 Units 0        Physical Exam:  /65 (BP Location: Right arm, Patient Position: Sitting, Cuff Size: Adult Large)  Pulse 85  Temp 97.7  F (36.5  C) (Oral)  Resp 16  Ht 1.6 m (5' 2.99\")  Wt 97.4 kg (214 lb 12.8 oz)  SpO2 94%  Breastfeeding? No  BMI 38.06 kg/m2  Wt Readings from Last 12 Encounters:   03/21/18 97.4 kg (214 lb 12.8 oz)   03/15/18 95.6 kg (210 lb 11.2 oz)   03/10/18 98.3 kg (216 lb 12.8 oz)   03/04/18 96.5 kg (212 lb 11.9 oz)   03/03/18 96.6 kg (213 lb)   02/22/18 97.7 kg (215 lb 4.8 oz)   02/22/18 95.7 kg (211 lb)   02/15/18 98 kg (216 lb 1.6 oz)   01/18/18 97.7 kg (215 lb 6.4 oz)   12/07/17 95.5 kg (210 lb 9.6 oz)   11/24/17 94.3 kg (207 lb 12.8 oz)   11/22/17 93.9 kg (207 lb)     ECOG performance status: 1  GENERAL APPEARANCE: Healthy, alert and in no acute distress.  HEENT: Sclerae anicteric. PERRLA. Oropharynx without ulcers, lesions, or thrush.  NECK: Supple. No asymmetry or masses.  LYMPHATICS: No palpable cervical, supraclavicular, axillary, or inguinal lymphadenopathy.  RESP: Lungs clear to auscultation " bilaterally without rales, rhonchi or wheezes.  CARDIOVASCULAR: Regular rate and rhythm. Normal S1, S2; no S3 or S4. No murmur, gallop, or rub.  ABDOMEN: Soft, nontender. Bowel sounds normal. No palpable organomegaly or masses.  MUSCULOSKELETAL: Extremities without gross deformities noted. No edema of bilateral lower extremities.  SKIN: No suspicious lesions or rashes.  NEURO: Alert and oriented x 3. Cranial nerves II-XII grossly intact.  PSYCHIATRIC: Mentation and affect appear normal.    Laboratory/Imaging Studies:  Oncology Visit on 03/15/2018   Component Date Value Ref Range Status     WBC 03/15/2018 12.7* 4.0 - 11.0 10e9/L Final     RBC Count 03/15/2018 4.36  3.8 - 5.2 10e12/L Final     Hemoglobin 03/15/2018 12.3  11.7 - 15.7 g/dL Final     Hematocrit 03/15/2018 37.0  35.0 - 47.0 % Final     MCV 03/15/2018 85  78 - 100 fl Final     MCH 03/15/2018 28.2  26.5 - 33.0 pg Final     MCHC 03/15/2018 33.2  31.5 - 36.5 g/dL Final     RDW 03/15/2018 14.0  10.0 - 15.0 % Final     Platelet Count 03/15/2018 354  150 - 450 10e9/L Final     Diff Method 03/15/2018 Automated Method   Final     % Neutrophils 03/15/2018 61.4  % Final     % Lymphocytes 03/15/2018 29.7  % Final     % Monocytes 03/15/2018 7.3  % Final     % Eosinophils 03/15/2018 0.2  % Final     % Basophils 03/15/2018 0.2  % Final     % Immature Granulocytes 03/15/2018 1.2  % Final     Absolute Neutrophil 03/15/2018 7.8  1.6 - 8.3 10e9/L Final     Absolute Lymphocytes 03/15/2018 3.8  0.8 - 5.3 10e9/L Final     Absolute Monocytes 03/15/2018 0.9  0.0 - 1.3 10e9/L Final     Absolute Eosinophils 03/15/2018 0.0  0.0 - 0.7 10e9/L Final     Absolute Basophils 03/15/2018 0.0  0.0 - 0.2 10e9/L Final     Abs Immature Granulocytes 03/15/2018 0.2  0 - 0.4 10e9/L Final     Sodium 03/15/2018 141  133 - 144 mmol/L Final     Potassium 03/15/2018 3.0* 3.4 - 5.3 mmol/L Final     Chloride 03/15/2018 107  94 - 109 mmol/L Final     Carbon Dioxide 03/15/2018 24  20 - 32 mmol/L Final      Anion Gap 03/15/2018 10  3 - 14 mmol/L Final     Glucose 03/15/2018 116* 70 - 99 mg/dL Final     Urea Nitrogen 03/15/2018 15  7 - 30 mg/dL Final     Creatinine 03/15/2018 0.75  0.52 - 1.04 mg/dL Final     GFR Estimate 03/15/2018 85  >60 mL/min/1.7m2 Final    Non  GFR Calc     GFR Estimate If Black 03/15/2018 >90  >60 mL/min/1.7m2 Final    African American GFR Calc     Calcium 03/15/2018 8.3* 8.5 - 10.1 mg/dL Final     Bilirubin Total 03/15/2018 <0.1* 0.2 - 1.3 mg/dL Final     Albumin 03/15/2018 3.2* 3.4 - 5.0 g/dL Final     Protein Total 03/15/2018 6.8  6.8 - 8.8 g/dL Final     Alkaline Phosphatase 03/15/2018 61  40 - 150 U/L Final     ALT 03/15/2018 62* 0 - 50 U/L Final     AST 03/15/2018 15  0 - 45 U/L Final     Magnesium 03/15/2018 2.0  1.6 - 2.3 mg/dL Final        Recent Results (from the past 744 hour(s))   CT Abdomen Pelvis w Contrast    Narrative    CT ABDOMEN AND PELVIS WITH CONTRAST 3/4/2018 12:16 PM     HISTORY: Bloody diarrhea/abdominal pain, history of melanoma, axilla  surgery in October 2017.       COMPARISON: PET/CT 10/11/2017.    TECHNIQUE: Axial images from the lung bases to the symphysis are  performed with additional coronal reformatted images. 100 mL of Isovue  370 are given intravenously.  Radiation dose for this scan was reduced  using automated exposure control, adjustment of the mA and/or kV  according to patient size, or iterative reconstruction technique.    FINDINGS:   The lung bases are clear.    Abdomen: Tiny cyst measuring less than 1 cm is present in the left  hepatic dome. This was present on a prior contrast-enhanced CT from  2012 and is therefore considered a benign cyst. The liver is otherwise  unremarkable. The spleen, gallbladder, fatty replaced pancreas,  adrenal glands, and kidneys are unremarkable. No hydronephrosis. No  enlarged lymph nodes. The bowel is normal in caliber without  obstruction or diverticulitis. Appendix is normal.    Pelvis: The bladder,  uterus, and left ovary are unremarkable. Probable  dominant follicle right ovary is noted. No enlarged pelvic or inguinal  lymph nodes. There is colonic wall thickening and mucosal enhancement  involving the rectum and distal sigmoid colon with surrounding  perirectal adenopathy. In the surrounding mesorectal fat, small pelvic  sidewall lymph nodes are also noted. Findings could reflect an  infectious coloproctitis versus an inflammatory bowel disease such as  ulcerative colitis. Bone window examination is unremarkable.      Impression    IMPRESSION:  1. Probable infectious or inflammatory proctocolitis as described.  Surrounding mesorectal lymph nodes raise the possibility of a more  chronic inflammatory process such as ulcerative colitis. Underlying  mass should likely be excluded with follow-up colonoscopy if not  previously performed.  2. Simple cyst left hepatic lobe unchanged dating back to at least  2012. No further follow-up required. Abdominal and pelvic organs are  otherwise within normal limits.    ALEX RIVERA MD   US leg bilateral venous    Narrative    VENOUS ULTRASOUND BOTH LEGS  3/6/2018 8:36 PM     HISTORY: rule out DVT;     COMPARISON: None.    FINDINGS: Examination of the deep veins with graded compression and  color flow Doppler with spectral wave form analysis shows  no evidence  of thrombus in the common femoral vein, femoral vein, popliteal vein  or calf veins.  There is no venous insufficiency.      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  She is gradually improving.  We will continue on prednisone we will reduce her dose today to 50 mg orally daily.  I will see the patient again in 1 week or sooner if there are new developments or concerns.    Plan: CBC with platelets differential, Comprehensive metabolic  panel, Magnesium    (C43.62) Malignant melanoma of left upper extremity including shoulder (H)  There is no evidence of recurrence of melanoma.      The patient is ready to learn, no apparent learning barriers were identified.  Diagnosis and treatment plans were explained to the patient. The patient expressed understanding of the content. The patient asked appropriate questions. The patient questions were answered to her satisfaction.    Chart documentation with Dragon Voice recognition Software. Although reviewed after completion, some words and grammatical errors may remain.

## 2018-03-21 NOTE — PATIENT INSTRUCTIONS
Dr. Richards recommends you have labs today and weekly.  We will release the results to [email or web address]. We would like to see you back in clinic with Dr. Richards in 1 week, with standing weekly labs. Please decrease your Prednisone dose to 50 mg daily until you come back to clinic next week. When you are in need of a refill, please call your pharmacy and they will send us a request.  Copy of appointments, and after visit summary (AVS) given to patient.  If you have any questions during business hours (M-F 8 AM- 4PM), please call Jeanie Keenan RN, BSN, OCN Oncology Hematology /Breast Cancer Navigator at [facility] [phone].   For questions after business hours, or on holidays/weekends, please call our after hours Nurse Triage line [phone]. Thank you.

## 2018-03-22 ENCOUNTER — TELEPHONE (OUTPATIENT)
Dept: NUTRITION | Facility: CLINIC | Age: 43
End: 2018-03-22

## 2018-03-22 NOTE — TELEPHONE ENCOUNTER
Patient was contacted by phone due to requesting a call from the dietitian on the Oncology Distress Screening tool. I was unable to leave a voice mail message due to the patient's mailbox was full on her phone.    Cleo Calderon RD,LD  Clinical Dietitian

## 2018-03-29 ENCOUNTER — ONCOLOGY VISIT (OUTPATIENT)
Dept: ONCOLOGY | Facility: CLINIC | Age: 43
End: 2018-03-29
Attending: INTERNAL MEDICINE
Payer: COMMERCIAL

## 2018-03-29 ENCOUNTER — HOSPITAL ENCOUNTER (OUTPATIENT)
Dept: LAB | Facility: CLINIC | Age: 43
Discharge: HOME | End: 2018-03-29
Attending: INTERNAL MEDICINE | Admitting: INTERNAL MEDICINE
Payer: COMMERCIAL

## 2018-03-29 VITALS
BODY MASS INDEX: 37.86 KG/M2 | OXYGEN SATURATION: 93 % | DIASTOLIC BLOOD PRESSURE: 75 MMHG | HEIGHT: 63 IN | HEART RATE: 105 BPM | SYSTOLIC BLOOD PRESSURE: 116 MMHG | WEIGHT: 213.7 LBS

## 2018-03-29 DIAGNOSIS — C43.62 MALIGNANT MELANOMA OF LEFT UPPER EXTREMITY INCLUDING SHOULDER (H): Primary | ICD-10-CM

## 2018-03-29 DIAGNOSIS — K52.9 COLITIS: ICD-10-CM

## 2018-03-29 DIAGNOSIS — K52.9 PROCTOCOLITIS: ICD-10-CM

## 2018-03-29 DIAGNOSIS — F41.1 ANXIETY STATE: ICD-10-CM

## 2018-03-29 DIAGNOSIS — C43.9 METASTATIC MALIGNANT MELANOMA (H): ICD-10-CM

## 2018-03-29 DIAGNOSIS — F32.0 MILD MAJOR DEPRESSION (H): ICD-10-CM

## 2018-03-29 LAB
ALBUMIN SERPL-MCNC: 3.6 G/DL (ref 3.4–5)
ALP SERPL-CCNC: 51 U/L (ref 40–150)
ALT SERPL W P-5'-P-CCNC: 81 U/L (ref 0–50)
ANION GAP SERPL CALCULATED.3IONS-SCNC: 6 MMOL/L (ref 3–14)
AST SERPL W P-5'-P-CCNC: 19 U/L (ref 0–45)
BASOPHILS # BLD AUTO: 0 10E9/L (ref 0–0.2)
BASOPHILS NFR BLD AUTO: 0.4 %
BILIRUB SERPL-MCNC: 0.4 MG/DL (ref 0.2–1.3)
BUN SERPL-MCNC: 13 MG/DL (ref 7–30)
CALCIUM SERPL-MCNC: 8.6 MG/DL (ref 8.5–10.1)
CHLORIDE SERPL-SCNC: 104 MMOL/L (ref 94–109)
CO2 SERPL-SCNC: 31 MMOL/L (ref 20–32)
CREAT SERPL-MCNC: 0.81 MG/DL (ref 0.52–1.04)
DIFFERENTIAL METHOD BLD: NORMAL
EOSINOPHIL # BLD AUTO: 0.1 10E9/L (ref 0–0.7)
EOSINOPHIL NFR BLD AUTO: 0.8 %
ERYTHROCYTE [DISTWIDTH] IN BLOOD BY AUTOMATED COUNT: 14.8 % (ref 10–15)
GFR SERPL CREATININE-BSD FRML MDRD: 78 ML/MIN/1.7M2
GLUCOSE SERPL-MCNC: 80 MG/DL (ref 70–99)
HCT VFR BLD AUTO: 40.6 % (ref 35–47)
HGB BLD-MCNC: 13.1 G/DL (ref 11.7–15.7)
IMM GRANULOCYTES # BLD: 0.1 10E9/L (ref 0–0.4)
IMM GRANULOCYTES NFR BLD: 1.2 %
LYMPHOCYTES # BLD AUTO: 4.2 10E9/L (ref 0.8–5.3)
LYMPHOCYTES NFR BLD AUTO: 39.3 %
MAGNESIUM SERPL-MCNC: 2.3 MG/DL (ref 1.6–2.3)
MCH RBC QN AUTO: 28.2 PG (ref 26.5–33)
MCHC RBC AUTO-ENTMCNC: 32.3 G/DL (ref 31.5–36.5)
MCV RBC AUTO: 88 FL (ref 78–100)
MONOCYTES # BLD AUTO: 0.9 10E9/L (ref 0–1.3)
MONOCYTES NFR BLD AUTO: 7.9 %
NEUTROPHILS # BLD AUTO: 5.4 10E9/L (ref 1.6–8.3)
NEUTROPHILS NFR BLD AUTO: 50.4 %
PLATELET # BLD AUTO: 318 10E9/L (ref 150–450)
POTASSIUM SERPL-SCNC: 3.8 MMOL/L (ref 3.4–5.3)
PROT SERPL-MCNC: 7.1 G/DL (ref 6.8–8.8)
RBC # BLD AUTO: 4.64 10E12/L (ref 3.8–5.2)
SODIUM SERPL-SCNC: 141 MMOL/L (ref 133–144)
WBC # BLD AUTO: 10.8 10E9/L (ref 4–11)

## 2018-03-29 PROCEDURE — 83735 ASSAY OF MAGNESIUM: CPT | Performed by: INTERNAL MEDICINE

## 2018-03-29 PROCEDURE — 85025 COMPLETE CBC W/AUTO DIFF WBC: CPT | Performed by: INTERNAL MEDICINE

## 2018-03-29 PROCEDURE — 99214 OFFICE O/P EST MOD 30 MIN: CPT | Performed by: INTERNAL MEDICINE

## 2018-03-29 PROCEDURE — 80053 COMPREHEN METABOLIC PANEL: CPT | Performed by: INTERNAL MEDICINE

## 2018-03-29 PROCEDURE — 36415 COLL VENOUS BLD VENIPUNCTURE: CPT | Performed by: INTERNAL MEDICINE

## 2018-03-29 PROCEDURE — G0463 HOSPITAL OUTPT CLINIC VISIT: HCPCS

## 2018-03-29 RX ORDER — LORAZEPAM 0.5 MG/1
.5-1 TABLET ORAL EVERY 6 HOURS PRN
Qty: 40 TABLET | Refills: 0 | Status: SHIPPED | OUTPATIENT
Start: 2018-03-29 | End: 2018-04-19

## 2018-03-29 RX ORDER — PREDNISONE 10 MG/1
20 TABLET ORAL DAILY
Qty: 100 TABLET | Refills: 1 | Status: SHIPPED | OUTPATIENT
Start: 2018-03-29 | End: 2019-01-28 | Stop reason: DRUGHIGH

## 2018-03-29 RX ORDER — PREDNISONE 50 MG/1
TABLET ORAL
Qty: 15 TABLET | Refills: 0 | Status: CANCELLED | OUTPATIENT
Start: 2018-03-29

## 2018-03-29 ASSESSMENT — PAIN SCALES - GENERAL: PAINLEVEL: MODERATE PAIN (5)

## 2018-03-29 NOTE — MR AVS SNAPSHOT
After Visit Summary   3/29/2018    Doretha Fernandez    MRN: [id number]           Patient Information     Date Of Birth          1975        Visit Information        Provider Department      3/29/2018 9:45 AM Kathy Richards MD [facility] ONCOLOGY      Today's Diagnoses     Malignant melanoma of left upper extremity including shoulder (H)    -  1    Mild major depression (H)        Colitis        Proctocolitis        Anxiety state        Metastatic malignant melanoma (H)           Follow-ups after your visit        Follow-up notes from your care team     Return in about 2 weeks (around 4/12/2018) for Blood work before next appointment.      Your next 10 appointments already scheduled     Apr 02, 2018  9:30 AM CDT   Lymphedema Treatment with Anastasiya Rodriguez PTA   [facility] ([facility])    [address] [address]   [phone]            Apr 09, 2018  2:30 PM CDT   [facility] Visit with Lowell Bullock MD   [facility]    [address] [address]   [phone]            Apr 13, 2018  9:50 AM CDT   LAB with Levine, Susan. \[facility]ospital [facility].\"" Lab ([facility])    [address] [address]   [phone]           Please do not eat 10-12 hours before your appointment if you are coming in fasting for labs on lipids, cholesterol, or glucose (sugar). This does not apply to pregnant women. Water, hot tea and black coffee (with nothing added) are okay. Do not drink other fluids, diet soda or chew gum.            Apr 13, 2018 10:45 AM CDT   Return Visit with Kathy Richards MD   [facility] ([facility])    [facility]  [address]  [facility] [address]   [phone]              Future tests that were ordered for you today     Open Future Orders        Priority Expected Expires  "Ordered    CBC with platelets differential Routine 4/11/2018 3/29/2019 3/29/2018    Comprehensive metabolic panel Routine 4/11/2018 3/29/2019 3/29/2018            Who to contact     If you have questions or need follow up information about today's clinic visit or your schedule please contact [facility]INIC directly at [phone].  Normal or non-critical lab and imaging results will be communicated to you by MyChart, letter or phone within 4 business days after the clinic has received the results. If you do not hear from us within 7 days, please contact the clinic through [facility] or phone. If you have a critical or abnormal lab result, we will notify you by phone as soon as possible.  Submit refill requests through [facility] or call your pharmacy and they will forward the refill request to us. Please allow 3 business days for your refill to be completed.          Additional Information About Your Visit        [facility] Information     [facility] gives you secure access to your electronic health record. If you see a primary care provider, you can also send messages to your care team and make appointments. If you have questions, please call your primary care clinic.  If you do not have a primary care provider, please call [phone] and they will assist you.        Care EveryWhere ID     This is your Care EveryWhere ID. This could be used by other organizations to access your [place] medical records  [id number]        Your Vitals Were     Pulse Height Pulse Oximetry Breastfeeding? BMI (Body Mass Index)       105 1.6 m (5' 2.99\") 93% No 37.86 kg/m2        Blood Pressure from Last 3 Encounters:   03/29/18 116/75   03/21/18 120/65   03/15/18 112/74    Weight from Last 3 Encounters:   03/29/18 96.9 kg (213 lb 11.2 oz)   03/21/18 97.4 kg (214 lb 12.8 oz)   03/15/18 95.6 kg (210 lb 11.2 oz)                 Today's Medication Changes          These changes are accurate as of 3/29/18 10:25 AM.  If you have any questions, " ask your nurse or doctor.               These medicines have changed or have updated prescriptions.        Dose/Directions    * predniSONE 50 MG tablet   Commonly known as:  DELTASONE   This may have changed:  Another medication with the same name was added. Make sure you understand how and when to take each.   Used for:  Proctocolitis   Changed by:  Kathy Richards MD        Take 100mg PO qAM with breakfast x 5 days starting on Sun 3/11, then decrease to 50mg PO qAM. Then taper as instructed by your oncologist.   Quantity:  15 tablet   Refills:  0       * predniSONE 10 MG tablet   Commonly known as:  DELTASONE   This may have changed:  You were already taking a medication with the same name, and this prescription was added. Make sure you understand how and when to take each.   Used for:  Malignant melanoma of left upper extremity including shoulder (H), Colitis   Changed by:  Kathy Richards MD        Dose:  20 mg   Take 2 tablets (20 mg) by mouth daily   Quantity:  100 tablet   Refills:  1       * Notice:  This list has 2 medication(s) that are the same as other medications prescribed for you. Read the directions carefully, and ask your doctor or other care provider to review them with you.         Where to get your medicines      These medications were sent to [facility] [place], [place] - [address]  [address] [address]     Phone:  [phone]     predniSONE 10 MG tablet         Some of these will need a paper prescription and others can be bought over the counter.  Ask your nurse if you have questions.     Bring a paper prescription for each of these medications     LORazepam 0.5 MG tablet                Primary Care Provider Office Phone # Fax #    Anna Naidu [phone] [address] [address]        Equal Access to Services     BRIGETTE QUARLES AH: melba Valenzuela qaybta kaalmada adeegyada,  "waxjose guadalupe anyain hayaan dagmarchasity jazzlula butt'aan ah. So [facility] [phone].    ATENCIÓN: Si habla heidi, tiene a mcdonnell disposición servicios gratuitos de asistencia lingüística. Diana al [phone].    We comply with applicable federal civil rights laws and [place] laws. We do not discriminate on the basis of race, color, national origin, age, disability, sex, sexual orientation, or gender identity.            Thank you!     Thank you for choosing [facility] [place]  for your care. Our goal is always to provide you with excellent care. Hearing back from our patients is one way we can continue to improve our services. Please take a few minutes to complete the written survey that you may receive in the mail after your visit with us. Thank you!             Your Updated Medication List - Protect others around you: Learn how to safely use, store and throw away your medicines at www.disposemymeds.org.          This list is accurate as of 3/29/18 10:25 AM.  Always use your most recent med list.                   Brand Name Dispense Instructions for use Diagnosis    HYDROmorphone 4 MG tablet    DILAUDID    170 tablet    Take 1-1.5 tablets (4-6 mg) by mouth every 3 hours as needed for moderate to severe pain    Proctocolitis       LORazepam 0.5 MG tablet    ATIVAN    40 tablet    Take 1-2 tablets (0.5-1 mg) by mouth every 6 hours as needed for anxiety or other (nausea)    Proctocolitis, Anxiety state, Metastatic malignant melanoma (H)       ondansetron 8 MG ODT tab    ZOFRAN-ODT    60 tablet    Take 1 tablet (8 mg) by mouth every 8 hours as needed for nausea or vomiting    Proctocolitis, Metastatic malignant melanoma (H)       * order for DME     1 Units    Equipment being ordered: 20-30mmHg compression sleeve and 20-30mmHg compression glove\" x2 pair    Lymphedema of left arm       * order for DME     1 each    Equipment being ordered: x2 Wearease compression shirt    Secondary lymphedema       * predniSONE 50 MG tablet    " DELTASONE    15 tablet    Take 100mg PO qAM with breakfast x 5 days starting on Sun 3/11, then decrease to 50mg PO qAM. Then taper as instructed by your oncologist.    Proctocolitis       * predniSONE 10 MG tablet    DELTASONE    100 tablet    Take 2 tablets (20 mg) by mouth daily    Malignant melanoma of left upper extremity including shoulder (H), Colitis       rOPINIRole 0.25 MG tablet    REQUIP    30 tablet    Take 1 tablet (0.25 mg) by mouth At Bedtime    Restless leg syndrome       TYLENOL PO      Take 1,000 mg by mouth every 8 hours as needed for mild pain or fever        venlafaxine 150 MG Tb24 24 hr tablet    EFFEXOR-ER    30 each    Take 1 tablet (150 mg) by mouth daily (with breakfast)    Mild major depression (H), Diarrhea       Vitamin D3 3000 UNITS Tabs     30 tablet    Take 3,000 Int'l Units by mouth daily    Vitamin D deficiency       * Notice:  This list has 4 medication(s) that are the same as other medications prescribed for you. Read the directions carefully, and ask your doctor or other care provider to review them with you.

## 2018-03-29 NOTE — LETTER
3/29/2018         RE: Doretha Fernandez  [address]  [place] [address]        Dear Colleague,    Thank you for referring your patient, Doretha Fernadnez, to the [facility]. Please see a copy of my visit note below.    Hematology/ Oncology Follow-up Visit:  Mar 29, 2018    Reason for Visit:   Chief Complaint   Patient presents with     Oncology Clinic Visit     1 week recheck Metastatic malignant melanoma, review labs       Interval History:  Patient is here today for follow-up.  Her diarrhea is gradually improving.  She denies any abdominal pain.  She denies any recent weight loss or night sweats or fever or chills.  She denies any blood in the stool.  She is currently on prednisone 50 mg daily.    Review Of Systems:  Constitutional: Negative for fever, chills, and night sweats.  Skin: negative.  Eyes: negative.  Ears/Nose/Throat: negative.  Respiratory: No shortness of breath, dyspnea on exertion, cough, or hemoptysis.  Cardiovascular: negative.  Gastrointestinal: Diarrhea as mentioned above  Genitourinary: negative.  Musculoskeletal: negative.  Neurologic: negative.  Psychiatric: negative.  Hematologic/Lymphatic/Immunologic: negative.  Endocrine: negative.    All other ROS negative unless mentioned in interval history.    Past medical, social, surgical, and family histories reviewed.    Allergies:  Allergies as of 03/29/2018 - Nicola as Reviewed 03/29/2018   Allergen Reaction Noted     Compazine [prochlorperazine] Fatigue 10/12/2017     Fentanyl Other (See Comments) 10/12/2017     Erythrocin Nausea and Vomiting 03/20/2008     Zithromax [azithromycin dihydrate] Diarrhea 02/17/2012       Current Medications:  Current Outpatient Prescriptions   Medication Sig Dispense Refill     LORazepam (ATIVAN) 0.5 MG tablet Take 1-2 tablets (0.5-1 mg) by mouth every 6 hours as needed for anxiety or other (nausea) 40 tablet 0     predniSONE (DELTASONE) 10 MG tablet Take 2 tablets (20 mg) by mouth daily 100 tablet  "1     predniSONE (DELTASONE) 50 MG tablet Take 100mg PO qAM with breakfast x 5 days starting on Sun 3/11, then decrease to 50mg PO qAM. Then taper as instructed by your oncologist. 15 tablet 0     ondansetron (ZOFRAN-ODT) 8 MG ODT tab Take 1 tablet (8 mg) by mouth every 8 hours as needed for nausea or vomiting 60 tablet 0     Acetaminophen (TYLENOL PO) Take 1,000 mg by mouth every 8 hours as needed for mild pain or fever       venlafaxine (EFFEXOR-ER) 150 MG TB24 24 hr tablet Take 1 tablet (150 mg) by mouth daily (with breakfast) 30 each 0     order for DME Equipment being ordered: x2 Wearease compression shirt 1 each 0     order for DME Equipment being ordered: 20-30mmHg compression sleeve and 20-30mmHg compression glove\" x2 pair 1 Units 0     rOPINIRole (REQUIP) 0.25 MG tablet Take 1 tablet (0.25 mg) by mouth At Bedtime 30 tablet 11     Cholecalciferol (VITAMIN D3) 3000 UNITS TABS Take 3,000 Int'l Units by mouth daily 30 tablet 3     HYDROmorphone (DILAUDID) 4 MG tablet Take 1-1.5 tablets (4-6 mg) by mouth every 3 hours as needed for moderate to severe pain (Patient not taking: Reported on 3/29/2018) 170 tablet 0        Physical Exam:  /75 (BP Location: Right arm, Patient Position: Sitting, Cuff Size: Adult Large)  Pulse 105  Ht 1.6 m (5' 2.99\")  Wt 96.9 kg (213 lb 11.2 oz)  SpO2 93%  Breastfeeding? No  BMI 37.86 kg/m2  Wt Readings from Last 12 Encounters:   03/29/18 96.9 kg (213 lb 11.2 oz)   03/21/18 97.4 kg (214 lb 12.8 oz)   03/15/18 95.6 kg (210 lb 11.2 oz)   03/10/18 98.3 kg (216 lb 12.8 oz)   03/04/18 96.5 kg (212 lb 11.9 oz)   03/03/18 96.6 kg (213 lb)   02/22/18 97.7 kg (215 lb 4.8 oz)   02/22/18 95.7 kg (211 lb)   02/15/18 98 kg (216 lb 1.6 oz)   01/18/18 97.7 kg (215 lb 6.4 oz)   12/07/17 95.5 kg (210 lb 9.6 oz)   11/24/17 94.3 kg (207 lb 12.8 oz)     GENERAL APPEARANCE: Healthy, alert and in no acute distress.  HEENT: Sclerae anicteric. PERRLA. Oropharynx without ulcers, lesions, or " thrush.  NECK: Supple. No asymmetry or masses.  LYMPHATICS: No palpable cervical, supraclavicular, axillary, or inguinal lymphadenopathy.  RESP: Lungs clear to auscultation bilaterally without rales, rhonchi or wheezes.  CARDIOVASCULAR: Regular rate and rhythm. Normal S1, S2; no S3 or S4. No murmur, gallop, or rub.  ABDOMEN: Soft, nontender. Bowel sounds normal. No palpable organomegaly or masses.  MUSCULOSKELETAL: Extremities without gross deformities noted. No edema of bilateral lower extremities.  SKIN: No suspicious lesions or rashes.  NEURO: Alert and oriented x 3. Cranial nerves II-XII grossly intact.  PSYCHIATRIC: Mentation and affect appear normal.    Laboratory/Imaging Studies:  Oncology Visit on 03/21/2018   Component Date Value Ref Range Status     WBC 03/21/2018 19.9* 4.0 - 11.0 10e9/L Final     RBC Count 03/21/2018 4.42  3.8 - 5.2 10e12/L Final     Hemoglobin 03/21/2018 12.5  11.7 - 15.7 g/dL Final     Hematocrit 03/21/2018 38.4  35.0 - 47.0 % Final     MCV 03/21/2018 87  78 - 100 fl Final     MCH 03/21/2018 28.3  26.5 - 33.0 pg Final     MCHC 03/21/2018 32.6  31.5 - 36.5 g/dL Final     RDW 03/21/2018 14.5  10.0 - 15.0 % Final     Platelet Count 03/21/2018 389  150 - 450 10e9/L Final     Diff Method 03/21/2018 Automated Method   Final     % Neutrophils 03/21/2018 91.6  % Final     % Lymphocytes 03/21/2018 6.1  % Final     % Monocytes 03/21/2018 1.1  % Final     % Eosinophils 03/21/2018 0.0  % Final     % Basophils 03/21/2018 0.1  % Final     % Immature Granulocytes 03/21/2018 1.1  % Final     Absolute Neutrophil 03/21/2018 18.3* 1.6 - 8.3 10e9/L Final     Absolute Lymphocytes 03/21/2018 1.2  0.8 - 5.3 10e9/L Final     Absolute Monocytes 03/21/2018 0.2  0.0 - 1.3 10e9/L Final     Absolute Eosinophils 03/21/2018 0.0  0.0 - 0.7 10e9/L Final     Absolute Basophils 03/21/2018 0.0  0.0 - 0.2 10e9/L Final     Abs Immature Granulocytes 03/21/2018 0.2  0 - 0.4 10e9/L Final     Sodium 03/21/2018 138  133 - 144  mmol/L Final     Potassium 03/21/2018 3.7  3.4 - 5.3 mmol/L Final     Chloride 03/21/2018 105  94 - 109 mmol/L Final     Carbon Dioxide 03/21/2018 23  20 - 32 mmol/L Final     Anion Gap 03/21/2018 10  3 - 14 mmol/L Final     Glucose 03/21/2018 178* 70 - 99 mg/dL Final     Urea Nitrogen 03/21/2018 15  7 - 30 mg/dL Final     Creatinine 03/21/2018 0.63  0.52 - 1.04 mg/dL Final     GFR Estimate 03/21/2018 >90  >60 mL/min/1.7m2 Final    Non  GFR Calc     GFR Estimate If Black 03/21/2018 >90  >60 mL/min/1.7m2 Final    African American GFR Calc     Calcium 03/21/2018 8.2* 8.5 - 10.1 mg/dL Final     Bilirubin Total 03/21/2018 0.2  0.2 - 1.3 mg/dL Final     Albumin 03/21/2018 3.3* 3.4 - 5.0 g/dL Final     Protein Total 03/21/2018 7.1  6.8 - 8.8 g/dL Final     Alkaline Phosphatase 03/21/2018 59  40 - 150 U/L Final     ALT 03/21/2018 37  0 - 50 U/L Final     AST 03/21/2018 18  0 - 45 U/L Final     Magnesium 03/21/2018 2.3  1.6 - 2.3 mg/dL Final        Recent Results (from the past 744 hour(s))   CT Abdomen Pelvis w Contrast    Narrative    CT ABDOMEN AND PELVIS WITH CONTRAST 3/4/2018 12:16 PM     HISTORY: Bloody diarrhea/abdominal pain, history of melanoma, axilla  surgery in October 2017.       COMPARISON: PET/CT 10/11/2017.    TECHNIQUE: Axial images from the lung bases to the symphysis are  performed with additional coronal reformatted images. 100 mL of Isovue  370 are given intravenously.  Radiation dose for this scan was reduced  using automated exposure control, adjustment of the mA and/or kV  according to patient size, or iterative reconstruction technique.    FINDINGS:   The lung bases are clear.    Abdomen: Tiny cyst measuring less than 1 cm is present in the left  hepatic dome. This was present on a prior contrast-enhanced CT from  2012 and is therefore considered a benign cyst. The liver is otherwise  unremarkable. The spleen, gallbladder, fatty replaced pancreas,  adrenal glands, and kidneys are  unremarkable. No hydronephrosis. No  enlarged lymph nodes. The bowel is normal in caliber without  obstruction or diverticulitis. Appendix is normal.    Pelvis: The bladder, uterus, and left ovary are unremarkable. Probable  dominant follicle right ovary is noted. No enlarged pelvic or inguinal  lymph nodes. There is colonic wall thickening and mucosal enhancement  involving the rectum and distal sigmoid colon with surrounding  perirectal adenopathy. In the surrounding mesorectal fat, small pelvic  sidewall lymph nodes are also noted. Findings could reflect an  infectious coloproctitis versus an inflammatory bowel disease such as  ulcerative colitis. Bone window examination is unremarkable.      Impression    IMPRESSION:  1. Probable infectious or inflammatory proctocolitis as described.  Surrounding mesorectal lymph nodes raise the possibility of a more  chronic inflammatory process such as ulcerative colitis. Underlying  mass should likely be excluded with follow-up colonoscopy if not  previously performed.  2. Simple cyst left hepatic lobe unchanged dating back to at least  2012. No further follow-up required. Abdominal and pelvic organs are  otherwise within normal limits.    ALEX RIVERA MD   US leg bilateral venous    Narrative    VENOUS ULTRASOUND BOTH LEGS  3/6/2018 8:36 PM     HISTORY: rule out DVT;     COMPARISON: None.    FINDINGS: Examination of the deep veins with graded compression and  color flow Doppler with spectral wave form analysis shows  no evidence  of thrombus in the common femoral vein, femoral vein, popliteal vein  or calf veins.  There is no venous insufficiency.      Impression    IMPRESSION: No DVT is identified in either lower extremity    JD HAYES MD       Assessment and plan:    (C43.62) Malignant melanoma of left upper extremity including shoulder (H)  (primary encounter diagnosis)  Immunotherapy with Opdivo will be on hold because of the patient colitis.  We will see the patient  again in 2 weeks to reassess.    (F32.0) Mild major depression (H)  Patient currently on Effexor  mg orally daily.    (K52.9) Colitis  I would recommend patient to continue on prednisone at 20 mg orally daily to switch to 10 mg in 1 week time.    The patient is ready to learn, no apparent learning barriers were identified.  Diagnosis and treatment plans were explained to the patient. The patient expressed understanding of the content. The patient asked appropriate questions. The patient questions were answered to her satisfaction.    Chart documentation with Dragon Voice recognition Software. Although reviewed after completion, some words and grammatical errors may remain.    Again, thank you for allowing me to participate in the care of your patient.        Sincerely,        Kathy Richards MD

## 2018-03-29 NOTE — PROGRESS NOTES
Hematology/ Oncology Follow-up Visit:  Mar 29, 2018    Reason for Visit:   Chief Complaint   Patient presents with     Oncology Clinic Visit     1 week recheck Metastatic malignant melanoma, review labs       Interval History:  Patient is here today for follow-up.  Her diarrhea is gradually improving.  She denies any abdominal pain.  She denies any recent weight loss or night sweats or fever or chills.  She denies any blood in the stool.  She is currently on prednisone 50 mg daily.    Review Of Systems:  Constitutional: Negative for fever, chills, and night sweats.  Skin: negative.  Eyes: negative.  Ears/Nose/Throat: negative.  Respiratory: No shortness of breath, dyspnea on exertion, cough, or hemoptysis.  Cardiovascular: negative.  Gastrointestinal: Diarrhea as mentioned above  Genitourinary: negative.  Musculoskeletal: negative.  Neurologic: negative.  Psychiatric: negative.  Hematologic/Lymphatic/Immunologic: negative.  Endocrine: negative.    All other ROS negative unless mentioned in interval history.    Past medical, social, surgical, and family histories reviewed.    Allergies:  Allergies as of 03/29/2018 - Nicola as Reviewed 03/29/2018   Allergen Reaction Noted     Compazine [prochlorperazine] Fatigue 10/12/2017     Fentanyl Other (See Comments) 10/12/2017     Erythrocin Nausea and Vomiting 03/20/2008     Zithromax [azithromycin dihydrate] Diarrhea 02/17/2012       Current Medications:  Current Outpatient Prescriptions   Medication Sig Dispense Refill     LORazepam (ATIVAN) 0.5 MG tablet Take 1-2 tablets (0.5-1 mg) by mouth every 6 hours as needed for anxiety or other (nausea) 40 tablet 0     predniSONE (DELTASONE) 10 MG tablet Take 2 tablets (20 mg) by mouth daily 100 tablet 1     predniSONE (DELTASONE) 50 MG tablet Take 100mg PO qAM with breakfast x 5 days starting on Sun 3/11, then decrease to 50mg PO qAM. Then taper as instructed by your oncologist. 15 tablet 0     ondansetron (ZOFRAN-ODT) 8 MG ODT tab  "Take 1 tablet (8 mg) by mouth every 8 hours as needed for nausea or vomiting 60 tablet 0     Acetaminophen (TYLENOL PO) Take 1,000 mg by mouth every 8 hours as needed for mild pain or fever       venlafaxine (EFFEXOR-ER) 150 MG TB24 24 hr tablet Take 1 tablet (150 mg) by mouth daily (with breakfast) 30 each 0     order for DME Equipment being ordered: x2 Wearease compression shirt 1 each 0     order for DME Equipment being ordered: 20-30mmHg compression sleeve and 20-30mmHg compression glove\" x2 pair 1 Units 0     rOPINIRole (REQUIP) 0.25 MG tablet Take 1 tablet (0.25 mg) by mouth At Bedtime 30 tablet 11     Cholecalciferol (VITAMIN D3) 3000 UNITS TABS Take 3,000 Int'l Units by mouth daily 30 tablet 3     HYDROmorphone (DILAUDID) 4 MG tablet Take 1-1.5 tablets (4-6 mg) by mouth every 3 hours as needed for moderate to severe pain (Patient not taking: Reported on 3/29/2018) 170 tablet 0        Physical Exam:  /75 (BP Location: Right arm, Patient Position: Sitting, Cuff Size: Adult Large)  Pulse 105  Ht 1.6 m (5' 2.99\")  Wt 96.9 kg (213 lb 11.2 oz)  SpO2 93%  Breastfeeding? No  BMI 37.86 kg/m2  Wt Readings from Last 12 Encounters:   03/29/18 96.9 kg (213 lb 11.2 oz)   03/21/18 97.4 kg (214 lb 12.8 oz)   03/15/18 95.6 kg (210 lb 11.2 oz)   03/10/18 98.3 kg (216 lb 12.8 oz)   03/04/18 96.5 kg (212 lb 11.9 oz)   03/03/18 96.6 kg (213 lb)   02/22/18 97.7 kg (215 lb 4.8 oz)   02/22/18 95.7 kg (211 lb)   02/15/18 98 kg (216 lb 1.6 oz)   01/18/18 97.7 kg (215 lb 6.4 oz)   12/07/17 95.5 kg (210 lb 9.6 oz)   11/24/17 94.3 kg (207 lb 12.8 oz)     GENERAL APPEARANCE: Healthy, alert and in no acute distress.  HEENT: Sclerae anicteric. PERRLA. Oropharynx without ulcers, lesions, or thrush.  NECK: Supple. No asymmetry or masses.  LYMPHATICS: No palpable cervical, supraclavicular, axillary, or inguinal lymphadenopathy.  RESP: Lungs clear to auscultation bilaterally without rales, rhonchi or wheezes.  CARDIOVASCULAR: " Regular rate and rhythm. Normal S1, S2; no S3 or S4. No murmur, gallop, or rub.  ABDOMEN: Soft, nontender. Bowel sounds normal. No palpable organomegaly or masses.  MUSCULOSKELETAL: Extremities without gross deformities noted. No edema of bilateral lower extremities.  SKIN: No suspicious lesions or rashes.  NEURO: Alert and oriented x 3. Cranial nerves II-XII grossly intact.  PSYCHIATRIC: Mentation and affect appear normal.    Laboratory/Imaging Studies:  Oncology Visit on 03/21/2018   Component Date Value Ref Range Status     WBC 03/21/2018 19.9* 4.0 - 11.0 10e9/L Final     RBC Count 03/21/2018 4.42  3.8 - 5.2 10e12/L Final     Hemoglobin 03/21/2018 12.5  11.7 - 15.7 g/dL Final     Hematocrit 03/21/2018 38.4  35.0 - 47.0 % Final     MCV 03/21/2018 87  78 - 100 fl Final     MCH 03/21/2018 28.3  26.5 - 33.0 pg Final     MCHC 03/21/2018 32.6  31.5 - 36.5 g/dL Final     RDW 03/21/2018 14.5  10.0 - 15.0 % Final     Platelet Count 03/21/2018 389  150 - 450 10e9/L Final     Diff Method 03/21/2018 Automated Method   Final     % Neutrophils 03/21/2018 91.6  % Final     % Lymphocytes 03/21/2018 6.1  % Final     % Monocytes 03/21/2018 1.1  % Final     % Eosinophils 03/21/2018 0.0  % Final     % Basophils 03/21/2018 0.1  % Final     % Immature Granulocytes 03/21/2018 1.1  % Final     Absolute Neutrophil 03/21/2018 18.3* 1.6 - 8.3 10e9/L Final     Absolute Lymphocytes 03/21/2018 1.2  0.8 - 5.3 10e9/L Final     Absolute Monocytes 03/21/2018 0.2  0.0 - 1.3 10e9/L Final     Absolute Eosinophils 03/21/2018 0.0  0.0 - 0.7 10e9/L Final     Absolute Basophils 03/21/2018 0.0  0.0 - 0.2 10e9/L Final     Abs Immature Granulocytes 03/21/2018 0.2  0 - 0.4 10e9/L Final     Sodium 03/21/2018 138  133 - 144 mmol/L Final     Potassium 03/21/2018 3.7  3.4 - 5.3 mmol/L Final     Chloride 03/21/2018 105  94 - 109 mmol/L Final     Carbon Dioxide 03/21/2018 23  20 - 32 mmol/L Final     Anion Gap 03/21/2018 10  3 - 14 mmol/L Final     Glucose  03/21/2018 178* 70 - 99 mg/dL Final     Urea Nitrogen 03/21/2018 15  7 - 30 mg/dL Final     Creatinine 03/21/2018 0.63  0.52 - 1.04 mg/dL Final     GFR Estimate 03/21/2018 >90  >60 mL/min/1.7m2 Final    Non  GFR Calc     GFR Estimate If Black 03/21/2018 >90  >60 mL/min/1.7m2 Final    African American GFR Calc     Calcium 03/21/2018 8.2* 8.5 - 10.1 mg/dL Final     Bilirubin Total 03/21/2018 0.2  0.2 - 1.3 mg/dL Final     Albumin 03/21/2018 3.3* 3.4 - 5.0 g/dL Final     Protein Total 03/21/2018 7.1  6.8 - 8.8 g/dL Final     Alkaline Phosphatase 03/21/2018 59  40 - 150 U/L Final     ALT 03/21/2018 37  0 - 50 U/L Final     AST 03/21/2018 18  0 - 45 U/L Final     Magnesium 03/21/2018 2.3  1.6 - 2.3 mg/dL Final        Recent Results (from the past 744 hour(s))   CT Abdomen Pelvis w Contrast    Narrative    CT ABDOMEN AND PELVIS WITH CONTRAST 3/4/2018 12:16 PM     HISTORY: Bloody diarrhea/abdominal pain, history of melanoma, axilla  surgery in October 2017.       COMPARISON: PET/CT 10/11/2017.    TECHNIQUE: Axial images from the lung bases to the symphysis are  performed with additional coronal reformatted images. 100 mL of Isovue  370 are given intravenously.  Radiation dose for this scan was reduced  using automated exposure control, adjustment of the mA and/or kV  according to patient size, or iterative reconstruction technique.    FINDINGS:   The lung bases are clear.    Abdomen: Tiny cyst measuring less than 1 cm is present in the left  hepatic dome. This was present on a prior contrast-enhanced CT from  2012 and is therefore considered a benign cyst. The liver is otherwise  unremarkable. The spleen, gallbladder, fatty replaced pancreas,  adrenal glands, and kidneys are unremarkable. No hydronephrosis. No  enlarged lymph nodes. The bowel is normal in caliber without  obstruction or diverticulitis. Appendix is normal.    Pelvis: The bladder, uterus, and left ovary are unremarkable. Probable  dominant  follicle right ovary is noted. No enlarged pelvic or inguinal  lymph nodes. There is colonic wall thickening and mucosal enhancement  involving the rectum and distal sigmoid colon with surrounding  perirectal adenopathy. In the surrounding mesorectal fat, small pelvic  sidewall lymph nodes are also noted. Findings could reflect an  infectious coloproctitis versus an inflammatory bowel disease such as  ulcerative colitis. Bone window examination is unremarkable.      Impression    IMPRESSION:  1. Probable infectious or inflammatory proctocolitis as described.  Surrounding mesorectal lymph nodes raise the possibility of a more  chronic inflammatory process such as ulcerative colitis. Underlying  mass should likely be excluded with follow-up colonoscopy if not  previously performed.  2. Simple cyst left hepatic lobe unchanged dating back to at least  2012. No further follow-up required. Abdominal and pelvic organs are  otherwise within normal limits.    ALEX RIVERA MD   US leg bilateral venous    Narrative    VENOUS ULTRASOUND BOTH LEGS  3/6/2018 8:36 PM     HISTORY: rule out DVT;     COMPARISON: None.    FINDINGS: Examination of the deep veins with graded compression and  color flow Doppler with spectral wave form analysis shows  no evidence  of thrombus in the common femoral vein, femoral vein, popliteal vein  or calf veins.  There is no venous insufficiency.      Impression    IMPRESSION: No DVT is identified in either lower extremity    JD HAYES MD       Assessment and plan:    (C43.62) Malignant melanoma of left upper extremity including shoulder (H)  (primary encounter diagnosis)  Immunotherapy with Opdivo will be on hold because of the patient colitis.  We will see the patient again in 2 weeks to reassess.    (F32.0) Mild major depression (H)  Patient currently on Effexor  mg orally daily.    (K52.9) Colitis  I would recommend patient to continue on prednisone at 20 mg orally daily to switch to 10 mg in  1 week time.    The patient is ready to learn, no apparent learning barriers were identified.  Diagnosis and treatment plans were explained to the patient. The patient expressed understanding of the content. The patient asked appropriate questions. The patient questions were answered to her satisfaction.    Chart documentation with Dragon Voice recognition Software. Although reviewed after completion, some words and grammatical errors may remain.

## 2018-03-29 NOTE — PATIENT INSTRUCTIONS
We would like to see you back in clinic with Dr. Richards in 2 weeks with labs prior.  You do not need to continue the weekly lab draws at this time.      Your prescription (prednisone) has been sent to:   [facility] - [place], [place] - [address]  [address] [address]  Phone: [phone] Fax: [phone] Alternate Fax: [phone], [phone]     Your prescription (Ativan) has been:  Given to you to hand carry to the pharmacy of your choice.  When you are in need of a refill, please call your pharmacy and they will send us a request.      Copy of appointments, and after visit summary (AVS) given to patient.      If you have any questions during business hours (M-F 8 AM- 4PM), please call Jeanie Keenan RN, BSN, OCN Oncology Hematology /Breast Cancer Navigator at [facility] [phone].       For questions after business hours, or on holidays/weekends, please call our after hours Nurse Triage line [phone]. Thank you.

## 2018-03-29 NOTE — NURSING NOTE
"Oncology Rooming Note    March 29, 2018 9:56 AM   Doretha Fernandez is a 43 year old female who presents for:    Chief Complaint   Patient presents with     Oncology Clinic Visit     1 week recheck Metastatic malignant melanoma, review labs     Initial Vitals: There were no vitals taken for this visit. Estimated body mass index is 38.06 kg/(m^2) as calculated from the following:    Height as of 3/21/18: 1.6 m (5' 2.99\").    Weight as of 3/21/18: 97.4 kg (214 lb 12.8 oz). There is no height or weight on file to calculate BSA.  Data Unavailable Comment: Data Unavailable   No LMP recorded.  Allergies reviewed: Yes  Medications reviewed: Yes    Medications: MEDICATION REFILLS NEEDED TODAY. Provider was notified.  Pharmacy name entered into [facility]: [facility] [place], [place] - [address] [place] [address]    Clinical concerns: 1 week recheck Metastatic malignant melanoma, review labs.     1. Cold symptoms or allergies. Can she take Zyrtec ?     7  minutes for nursing intake (face to face time)     Lesa Patel CMA              "

## 2018-04-04 ENCOUNTER — TELEPHONE (OUTPATIENT)
Dept: FAMILY MEDICINE | Facility: CLINIC | Age: 43
End: 2018-04-04

## 2018-04-04 ENCOUNTER — TELEPHONE (OUTPATIENT)
Dept: ONCOLOGY | Facility: CLINIC | Age: 43
End: 2018-04-04

## 2018-04-04 DIAGNOSIS — F32.0 MILD MAJOR DEPRESSION (H): ICD-10-CM

## 2018-04-04 RX ORDER — VENLAFAXINE HYDROCHLORIDE 150 MG/1
150 TABLET, EXTENDED RELEASE ORAL
Qty: 90 EACH | Refills: 1 | Status: SHIPPED | OUTPATIENT
Start: 2018-04-04 | End: 2018-11-13 | Stop reason: DRUGHIGH

## 2018-04-04 RX ORDER — VENLAFAXINE HYDROCHLORIDE 150 MG/1
150 TABLET, EXTENDED RELEASE ORAL
Qty: 30 EACH | Refills: 0 | Status: SHIPPED | OUTPATIENT
Start: 2018-04-04 | End: 2018-04-04

## 2018-04-04 ASSESSMENT — PATIENT HEALTH QUESTIONNAIRE - PHQ9: 5. POOR APPETITE OR OVEREATING: MORE THAN HALF THE DAYS

## 2018-04-04 ASSESSMENT — ANXIETY QUESTIONNAIRES
GAD7 TOTAL SCORE: 14
6. BECOMING EASILY ANNOYED OR IRRITABLE: SEVERAL DAYS
IF YOU CHECKED OFF ANY PROBLEMS ON THIS QUESTIONNAIRE, HOW DIFFICULT HAVE THESE PROBLEMS MADE IT FOR YOU TO DO YOUR WORK, TAKE CARE OF THINGS AT HOME, OR GET ALONG WITH OTHER PEOPLE: NOT DIFFICULT AT ALL
1. FEELING NERVOUS, ANXIOUS, OR ON EDGE: NEARLY EVERY DAY
5. BEING SO RESTLESS THAT IT IS HARD TO SIT STILL: SEVERAL DAYS
7. FEELING AFRAID AS IF SOMETHING AWFUL MIGHT HAPPEN: NEARLY EVERY DAY
3. WORRYING TOO MUCH ABOUT DIFFERENT THINGS: MORE THAN HALF THE DAYS
2. NOT BEING ABLE TO STOP OR CONTROL WORRYING: MORE THAN HALF THE DAYS

## 2018-04-04 NOTE — PROGRESS NOTES
Outpatient Physical Therapy Progress Note     Patient: Doretha Fernandez  : 1975    Beginning/End Dates of Reporting Period:  2018 to 3/29//2018    Referring Provider: Jessica Castellano PA-C    Therapy Diagnosis: LUE, L-breat and L-upper back lymphedema      Client Self Report: UE feels like it is decreased wearing 1 piece spandigrip but shoulder and breast feel swollen     Objective Measurements:  Objective Measure: LUE girth  Details: -4.3% since initial evaluation    Objective Measure: L-breast girth  Details: -6.2cm since last measured on 18     Outcome Measures (most recent score):   LLIS = 41 on date of initial evaluation; pt will again complete LLIS at time of discharge     Goals:  Goal Identifier stg   Goal Description pt to be independent with self-MLD of LUE for decreased edema reduction response and improved comfort with use   Target Date 17   Date Met  17   Progress:     Goal Identifier stg   Goal Description pt to be independent with donning, doffing and care of compression sleeve and glove for LUE management during upcoming flight/vacation   Target Date 17   Date Met  17   Progress:     Goal Identifier ltg   Goal Description pt to have increased LUE GH flexion and abduction to 130 degrees to increase use of LUE for overhead activities and ADLs   Target Date 18   Date Met      Progress:     Goal Identifier ltg   Goal Description pt to be independent with LUE lymphedema management via HEP, elevation, compression garment wear and self-MLD   Target Date 18   Date Met      Progress:     Goal Identifier ltg   Goal Description pt to have at least 8 point improvement on LLIS due to decreased lymphedema and increased comfort and ROM in LUE   Target Date 18   Date Met      Progress:       Progress Toward Goals:   Progress limited due to not assessed this period due to multiple medical complications and hospitalizations. Pt returning to clinic on 18 and  will have a re-assessment at that time.       Plan:  Continue therapy, re-assessment at next visit on 4/5/18.    Discharge:  No

## 2018-04-04 NOTE — ADDENDUM NOTE
Encounter addended by: Anna Graham, PT on: 4/4/2018  4:00 PM<BR>     Actions taken: Flowsheet accepted, Sign clinical note

## 2018-04-04 NOTE — TELEPHONE ENCOUNTER
Routing refill request to provider for review/approval because:  Ordered by specialty provider.    Thank you  Polly BAJWA RN

## 2018-04-04 NOTE — TELEPHONE ENCOUNTER
Refilled x 1 month, would be beneficial to get an updated PHQ9/GAD7.     Could be refilled longer if scores are good.    Anna Naidu M.D.

## 2018-04-04 NOTE — TELEPHONE ENCOUNTER
FYI:  PHQ-9  16.  SANDRA-7  14.  Pt states she feels the best she has in a long time even though the numbers are high.  Feels this med is the right med for her.  Hedy

## 2018-04-04 NOTE — TELEPHONE ENCOUNTER
Reason for Call:  Medication or medication refill:    Do you use a [facility]?  Name of the pharmacy and phone number for the current request:  [facility] [phone]    Name of the medication requested: Effexor - Pt calling for refill on this med.  I did tell pt that I don't see where Dr Naidu has prescribed this for her and she replied that it was Dr. Naidu or Dr. MARLEEN Harris and she needs refill today    Effexor      Last Written Prescription Date:  2/22/18  Last Fill Quantity: 30,   # refills: 0  Last Office Visit: 10/20/17  Future Office visit:    Next 5 appointments (look out 90 days)     Apr 09, 2018  2:30 PM CDT   Return Visit with Lowell Bullock MD   [facility]    [address] [address]   [phone]            Apr 13, 2018 10:45 AM CDT   Return Visit with Kathy Richards MD   [facility] ([facility])    [facility]  [address]  [facility] [address]   [phone]                 Routing refill request to provider for review/approval because:  Drug not on the [facility], [facility] or [facility] refill protocol or controlled substance    Other request:     Can we leave a detailed message on this number? YES    Phone number patient can be reached at: Cell number on file:    Telephone Information:   Mobile [phone]       Best Time: any    Call taken on 4/4/2018 at 12:36 PM by Jena Quintero

## 2018-04-05 ENCOUNTER — ONCOLOGY VISIT (OUTPATIENT)
Dept: ONCOLOGY | Facility: CLINIC | Age: 43
End: 2018-04-05
Attending: INTERNAL MEDICINE
Payer: COMMERCIAL

## 2018-04-05 ENCOUNTER — TELEPHONE (OUTPATIENT)
Dept: GASTROENTEROLOGY | Facility: CLINIC | Age: 43
End: 2018-04-05

## 2018-04-05 ENCOUNTER — HOSPITAL ENCOUNTER (OUTPATIENT)
Dept: PHYSICAL THERAPY | Facility: CLINIC | Age: 43
Setting detail: THERAPIES SERIES
End: 2018-04-05
Attending: PHYSICIAN ASSISTANT
Payer: COMMERCIAL

## 2018-04-05 VITALS
HEIGHT: 63 IN | WEIGHT: 211.1 LBS | SYSTOLIC BLOOD PRESSURE: 120 MMHG | DIASTOLIC BLOOD PRESSURE: 64 MMHG | RESPIRATION RATE: 18 BRPM | BODY MASS INDEX: 37.4 KG/M2 | OXYGEN SATURATION: 96 % | TEMPERATURE: 98.7 F | HEART RATE: 86 BPM

## 2018-04-05 DIAGNOSIS — C43.9 METASTATIC MALIGNANT MELANOMA (H): Primary | ICD-10-CM

## 2018-04-05 DIAGNOSIS — R21 RASH AND NONSPECIFIC SKIN ERUPTION: ICD-10-CM

## 2018-04-05 DIAGNOSIS — K52.9 COLITIS: ICD-10-CM

## 2018-04-05 PROCEDURE — 97164 PT RE-EVAL EST PLAN CARE: CPT | Mod: GP | Performed by: PHYSICAL THERAPIST

## 2018-04-05 PROCEDURE — 97110 THERAPEUTIC EXERCISES: CPT | Mod: GP | Performed by: PHYSICAL THERAPIST

## 2018-04-05 PROCEDURE — 99213 OFFICE O/P EST LOW 20 MIN: CPT | Performed by: INTERNAL MEDICINE

## 2018-04-05 PROCEDURE — 40000099 ZZH STATISTIC LYMPHEDEMA VISIT: Performed by: PHYSICAL THERAPIST

## 2018-04-05 PROCEDURE — G0463 HOSPITAL OUTPT CLINIC VISIT: HCPCS

## 2018-04-05 ASSESSMENT — PATIENT HEALTH QUESTIONNAIRE - PHQ9: SUM OF ALL RESPONSES TO PHQ QUESTIONS 1-9: 16

## 2018-04-05 ASSESSMENT — 6 MINUTE WALK TEST (6MWT): TOTAL DISTANCE WALKED (METERS): 1152

## 2018-04-05 ASSESSMENT — ANXIETY QUESTIONNAIRES: GAD7 TOTAL SCORE: 14

## 2018-04-05 ASSESSMENT — PAIN SCALES - GENERAL: PAINLEVEL: NO PAIN (0)

## 2018-04-05 NOTE — TELEPHONE ENCOUNTER
[facility] Call Center    Phone Message    May a detailed message be left on voicemail: yes    Reason for Call: Other: DX: Colitis.  Per protocols, pt should be seen within 1 month; however, 1st available is 6/18/18.  Please call pt for sooner appt.     Action Taken: Other: Routed to  [facility]

## 2018-04-05 NOTE — LETTER
4/5/2018         RE: Doretha Fernandez  [address]  [place] [address]        Dear Colleague,    Thank you for referring your patient, Doretha Fernandez, to the [facility]. Please see a copy of my visit note below.    DATE OF VISIT: Apr 5, 2018    Doretha Fernandez is a 43 year old female is seen today for   Chief Complaint   Patient presents with     Oncology Clinic Visit     Follow up Malignant Melanoma, rash of face.    .       (R21) Rash and nonspecific skin eruption  (primary encounter diagnosis)  Skin rash started yesterday.  Today the rash has been improving and fading.  We will continue to watch the patient.  I will see her again in 1 week or sooner if there are new developments or concerns.    (K52.9) Colitis  . She continues to have diarrhea.  She is currently on prednisone 20 mg orally daily.  We will have the patient reevaluated by gastroenterology    The patient is ready to learn, no apparent learning barriers were identified.  Diagnosis and treatment plans were explained to the patient. The patient expressed understanding of the content. The patient asked appropriate questions. The patient questions were answered to her satisfaction.  Time spent 15 minutes more than 50% of the time in counseling and coordination of care  Chart documentation with Dragon Voice recognition Software. Although reviewed after completion, some words and grammatical errors may remain.    Again, thank you for allowing me to participate in the care of your patient.        Sincerely,        Kathy Richards MD

## 2018-04-05 NOTE — NURSING NOTE
"Oncology Rooming Note    April 5, 2018 1:52 PM   Doretha Fernandez is a 43 year old female who presents for:    Chief Complaint   Patient presents with     Oncology Clinic Visit     Follow up Malignant Melanoma, rash of face.      Initial Vitals: /64 (BP Location: Right arm, Patient Position: Sitting, Cuff Size: Adult Large)  Pulse 86  Temp 98.7  F (37.1  C) (Tympanic)  Resp 18  Ht 1.6 m (5' 2.99\")  Wt 95.8 kg (211 lb 1.6 oz)  SpO2 96%  Breastfeeding? No  BMI 37.41 kg/m2 Estimated body mass index is 37.41 kg/(m^2) as calculated from the following:    Height as of this encounter: 1.6 m (5' 2.99\").    Weight as of this encounter: 95.8 kg (211 lb 1.6 oz). Body surface area is 2.06 meters squared.  No Pain (0) Comment: Data Unavailable   No LMP recorded.  Allergies reviewed: Yes  Medications reviewed: Yes    Medications: Medication refills not needed today.  Pharmacy name entered into [facility]: [facility] [place], [place] - [address] [place] [address]    Clinical concerns: Follow up Malignant Melanoma, rash of face. C/o rash across face, extreme redness and itchy across whole body also with a pasty feeling on skin. Extreme fatigue.     8 minutes for nursing intake (face to face time)     Dai Canales [facility]            "

## 2018-04-05 NOTE — PROGRESS NOTES
Outpatient Lymphedema and Cancer Rehab Evaluation      04/05/18 1300   Rehab Discipline   Discipline PT   Type of Visit   Type of visit Edema Re-evaluation  (with cancer rehab eval)       present No   General Information   Start of care 04/05/18  (date of re-evaluation)   Referring physician Jessica Castellano PA-C  (for lymphedema and Dr. Richards for cancer rehab)   Orders Evaluate and treat as indicated   Order date 11/13/17  (original order lymph; 4/5/18 for cancern rehab)   Medical diagnosis lymphedema of: LUE L-breast/chest, under L-axilla and upper L-back; significant deconditioning with chronic cancer treatments and recent hospitalizations from colitis    Onset of illness / date of surgery 10/23/17   Edema onset 10/23/17   Affected body parts LUE  (breast/chest, distal to L-axilla and L-upper back)   Location - Cancer with lymph node dissection 1/20+ from L-axilla   Chemotherapy comments had to stop immuntherapy at this time   Edema etiology comments see initial eval from 11/28/18 for etiology; since last seen in clinic on 2/22/18 pt has had a few hospitalizations for giardiasis and colitis   Pertinent history of current problem (PT: include personal factors and/or comorbidities that impact the POC; OT: include additional occupational profile info) seen at OP lymphedema clinic from 11/28/17 - 2/22/18 for MLD, compression, MFR; since last seen on 2/22/18 has only been wearing Komprex2 in bra at L-breast and tries to elevate as able; otherwise hasn't been wearing any compression due to generalized uncomfortable-ness   Surgical / medical history reviewed Yes   Edema special tests Ultrasound  (no hx of DVTs; US performed on LEs on 3/6/18 and negative )   Prior level of functional mobility independent with functional mobility and ADL, not using AD but reports very little energy, fatigued all the time and taking frequent rest breaks during the day; spending a lot of time in the bathroom due to  colitis; unable to work at this time due to medical appts and illness, quite fatigued with doing any acitivity, stairs and even short distance walking gets quite SOB having to take multiple rest breaks and naps often   Prior treatment Complete decongestive therapy;Elevation;MLD;Compression garments   Patient role / employment history Employed  (currently on FMLA)   Psychosocial concerns (history of depression and anxiety)   Living environment House / [facility]   Living environment comments two kids   Current assistive devices (no AD for ambulation)   Fall Risk Screen   Fall screen completed by PT   Have you fallen 2 or more times in the past year? No   Have you fallen and had an injury in the past year? No   Is patient a fall risk? No   System Outcome Measures   Outcome Measures Lymphedema   FACIT Fatigue Subscale (score out of 52). The higher the score, the better the QOL. 2   Six Minute Walk (meters). An increase of 70 or more meters indicates statistically significant change. 1152  (feet)   Lymphedema Life Impact Scale (score range 0-72). A higher score indicates greater impairment. 41   Subjective Report   Patient report of symptoms LUE is so heavy; upper back and breast feel so fully and heavy; I can't do anything, I have to take breaks all the time and get SOB with little activity, I nap all the time too   Precautions   Precautions comments no known precautions; pt getting treatment for cancer and ok to resume OP lymph treatment   Patient / Family Goals   Patient / family goals statement I want to feel like me again and get back to doing more and exercising   Pain   Patient currently in pain No   Vital Signs   Vital signs Pulse;SPO2   Pulse 89bpm   SPO2 98%   Cognitive Status   Orientation Orientation to person, place and time   Level of consciousness Alert   Follows commands and answers questions 100% of the time   Personal safety and judgement Intact   Memory Intact   Edema Exam / Assessment   Skin condition  Non-pitting;Intact;Pitting   Skin condition comments skin intact throughout; LUE with non-pitting edema (proximally > distally); 2+ pitting present in L-breast primarily in Linferior and lateral breast and non-pitting in L-upper back which is palpated to be very fully/thick   Scar Yes   Location just distal to L-axilla   Mobility fair   Capillary refill Symmetrical   Dorsal pedal pulse Symmetrical   Stemmer sign Negative   Ulceration No   Girth Measurements   Girth Measurements Refer to separate girth measurement flowsheet   Volume UE   Left UE (mL) 2253.68   % difference -8.1% since last measured on 2/13/18   Range of Motion   ROM comments LUE functional GH flexion, abduction and IR/ER   Strength   Strength comments BUEs and BLEs 3+ to 4-/5 throughout; generalized weakness and deconditioning   Posture   Posture Forward head position   Palpation   Palpation denies sensitivities   Activities of Daily Living   Activities of Daily Living see above mobility comments   Sensory   Sensory perception Light touch   Light touch Intact   Vascular Assessment   Vascular Assessment Comments no known concerns   Coordination   Coordination Gross motor coordination appropriate   Muscle Tone   Muscle tone No deficits were identified   Planned Edema Interventions   Planned edema interventions Manual lymph drainage;Gradient compression bandaging;Fit for compression garment;Exercises;Precautions to prevent infection / exacerbation;Education;Manual therapy;Scar mobilization;Myofascial release;Home management program development;Other   Planned edema intervention comments cancer rehab: aerobic conditioning and strength training    Clinical Impression   Criteria for skilled therapeutic intervention met Yes   Therapy diagnosis LUE, chest/breast and back lymphedema with generalized deconditioning s/p cancer treatments and prolonged illness   Influenced by the following impairments / conditions Stage 2   Functional limitations due to  impairments / conditions prolonged activity, prolonged ambulation, stairs, unable to work at this time   Clinical Presentation Evolving/Changing   Clinical Presentation Rationale clinical judgement; LLIS; FACIT   Clinical Decision Making (Complexity) Moderate complexity   Treatment frequency 2 times / week   Treatment duration 12 weeks   Patient / family and/or staff in agreement with plan of care Yes   Risks and benefits of therapy have been explained Yes   Goals   Edema Eval Goals 1;2;3;4;5   Goal 1   Goal identifier stg   Goal description pt to increase her 6MWT distance to 1400 feet to allow pt to continue living independently at home without AD   Target date 05/05/18   Goal 2   Goal identifier stg   Goal description pt to increase FACIT score by 3-5 points to assist pt in returning to work and independent level of living   Target date 05/05/18   Goal 3   Goal identifier ltg   Goal description pt to have increased B hip flex and B knee flex/ext to 4+/5 to assist with returning back to work fulltime and exercising    Target date 07/04/18   Goal 4   Goal identifier ltg   Goal description pt to be independent with LUE lymphedema management via HEP, elevation, compression garment wear and self-MLD   Target date 07/04/18   Goal 5   Goal identifier ltg   Goal description pt to have at least 8 point improvement on LLIS due to decreased lymphedema and increased comfort and ROM in LUE   Target date 07/04/18   Total Evaluation Time   Total evaluation time 15

## 2018-04-05 NOTE — MR AVS SNAPSHOT
After Visit Summary   4/5/2018    Doretha Fernandez    MRN: [id number]           Patient Information     Date Of Birth          1975        Visit Information        Provider Department      4/5/2018 1:15 PM Kathy Richards MD [facility] ONCOLOGY      Today's Diagnoses     Rash and nonspecific skin eruption    -  1    Colitis          Care Instructions    Dr. Richards is recommending you be seen in consultation by GI for the colitis. We would like to see you back in clinic with Dr. Richards next week.      When you are in need of a refill, please call your pharmacy and they will send us a request.      Copy of appointments, and after visit summary (AVS) given to patient.      If you have any questions during business hours (M-F 8 AM- 4PM), please call Jeanie Keenan RN, BSN, OCN Oncology Hematology /Breast Cancer Navigator at [facility] [phone].       For questions after business hours, or on holidays/weekends, please call our after hours Nurse Triage line [phone]. Thank you.            Follow-ups after your visit        Your next 10 appointments already scheduled     Apr 09, 2018  2:30 PM CDT   [facility] Visit [facility] Lowell Bullock MD   [facility]    [address] [place] [address]   [phone]            Apr 13, 2018  9:50 AM CDT   LAB with [facility] ([facility])    [address] [place] [address]   [phone]           Please do not eat 10-12 hours before your appointment if you are coming in fasting for labs on lipids, cholesterol, or glucose (sugar). This does not apply to pregnant women. Water, hot tea and black coffee (with nothing added) are okay. Do not drink other fluids, diet soda or chew gum.            Apr 13, 2018 10:45 AM CDT   Return Visit with Kathy Richards MD   [facility]    [facility]  [address] [place] [address]  [facility] [place] [place] [address]   [phone]              Who to contact     If you have questions or need follow up information about today's clinic visit or your schedule please contact [facility] directly at [phone].  Normal or non-critical lab and imaging results will be communicated to you by MyChart, letter or phone within 4 business days after the clinic has received the results. If you do not hear from us within 7 days, please contact the clinic through [employer] or phone. If you have a critical or abnormal lab result, we will notify you by phone as soon as possible.  Submit refill requests through [email or web address] or call your pharmacy and they will forward the refill request to us. Please allow 3 business days for your refill to be completed.          Additional Information About Your Visit        [employer] Information     [email or web address] gives you secure access to your electronic health record. If you see a primary care provider, you can also send messages to your care team and make appointments. If you have questions, please call your primary care clinic.  If you do not have a primary care provider, please call [phone] and they will assist you.        Care EveryWhere ID     This is your Care EveryWhere ID. This could be used by other organizations to access your [place] medical records  [id number]        Your Vitals Were     Pulse Temperature Respirations Height Pulse Oximetry Breastfeeding?    86 98.7  F (37.1  C) (Tympanic) 18 1.6 m (5' 2.99\") 96% No    BMI (Body Mass Index)                   37.41 kg/m2            Blood Pressure from Last 3 Encounters:   04/05/18 120/64   03/29/18 116/75   03/21/18 120/65    Weight from Last 3 Encounters:   04/05/18 95.8 kg (211 lb 1.6 oz)   03/29/18 96.9 kg (213 lb 11.2 oz)   03/21/18 97.4 kg (214 lb 12.8 oz)              Today, you had the following     No orders found for display       " Primary Care Provider Office Phone # Fax #    Anna Naidu [phone] [address] [facility] [address]        Equal Access to Services     BRIGETTE ROBINA : Nicol josy garnett richard Soalphonso, waaxda luqadaha, qaybta kaalmada chris, pancho dentonbeth anna. So [facility] [phone].    ATENCIÓN: Si habla español, tiene a mcdonnell disposición servicios gratuitos de asistencia lingüística. Llame al [phone].    We comply with applicable federal civil rights laws and [place] laws. We do not discriminate on the basis of race, color, national origin, age, disability, sex, sexual orientation, or gender identity.            Thank you!     Thank you for choosing [facility]  for your care. Our goal is always to provide you with excellent care. Hearing back from our patients is one way we can continue to improve our services. Please take a few minutes to complete the written survey that you may receive in the mail after your visit with us. Thank you!             Your Updated Medication List - Protect others around you: Learn how to safely use, store and throw away your medicines at www.disposemymeds.org.          This list is accurate as of 4/5/18  2:13 PM.  Always use your most recent med list.                   Brand Name Dispense Instructions for use Diagnosis    HYDROmorphone 4 MG tablet    DILAUDID    170 tablet    Take 1-1.5 tablets (4-6 mg) by mouth every 3 hours as needed for moderate to severe pain    Proctocolitis       LORazepam 0.5 MG tablet    ATIVAN    40 tablet    Take 1-2 tablets (0.5-1 mg) by mouth every 6 hours as needed for anxiety or other (nausea)    Proctocolitis, Anxiety state, Metastatic malignant melanoma (H)       ondansetron 8 MG ODT tab    ZOFRAN-ODT    60 tablet    Take 1 tablet (8 mg) by mouth every 8 hours as needed for nausea or vomiting    Proctocolitis, Metastatic malignant melanoma (H)       * order for DME     1 Units    Equipment being  "ordered: 20-30mmHg compression sleeve and 20-30mmHg compression glove\" x2 pair    Lymphedema of left arm       * order for DME     1 each    Equipment being ordered: x2 Weare[employer] compression shirt    Secondary lymphedema       predniSONE 10 MG tablet    DELTASONE    100 tablet    Take 2 tablets (20 mg) by mouth daily    Malignant melanoma of left upper extremity including shoulder (H), Colitis       rOPINIRole 0.25 MG tablet    REQUIP    30 tablet    Take 1 tablet (0.25 mg) by mouth At Bedtime    Restless leg syndrome       TYLENOL PO      Take 1,000 mg by mouth every 8 hours as needed for mild pain or fever        venlafaxine 150 MG Tb24 24 hr tablet    EFFEXOR-ER    90 each    Take 1 tablet (150 mg) by mouth daily (with breakfast)    Mild major depression (H)       Vitamin D3 3000 UNITS Tabs     30 tablet    Take 3,000 Int'l Units by mouth daily    Vitamin D deficiency       * Notice:  This list has 2 medication(s) that are the same as other medications prescribed for you. Read the directions carefully, and ask your doctor or other care provider to review them with you.      "

## 2018-04-05 NOTE — PATIENT INSTRUCTIONS
Dr. Richards is recommending you be seen in consultation by GI for the colitis. We would like to see you back in clinic with Dr. Richards next week.      When you are in need of a refill, please call your pharmacy and they will send us a request.      Copy of appointments, and after visit summary (AVS) given to patient.      If you have any questions during business hours (M-F 8 AM- 4PM), please call Jeanie Keenan RN, BSN, OCN Oncology Hematology /Breast Cancer Navigator at [facility] [phone].       For questions after business hours, or on holidays/weekends, please call our after hours Nurse Triage line [phone]. Thank you.

## 2018-04-05 NOTE — TELEPHONE ENCOUNTER
"Patient is a 43 year old female on treatment for Malignant melanoma, Opdivo is on hold due to colitis. Pt called to report that she is experiencing a red rash on her face.  It just started \"the other day. Maybe 3 days ago\". Patient is on prednisone for management of colitis.  Current dose is 20 mg. Denies any other symptom at this time.  Discussed with Dr. Richards, he would like to see her in clinic 04.05.18.  Pt is in agreement with this plan and is able to come prior to her lymphedema appt.  Added to schedule.  "

## 2018-04-05 NOTE — PROGRESS NOTES
DATE OF VISIT: Apr 5, 2018    Doretha Fernandez is a 43 year old female is seen today for   Chief Complaint   Patient presents with     Oncology Clinic Visit     Follow up Malignant Melanoma, rash of face.    .       (R21) Rash and nonspecific skin eruption  (primary encounter diagnosis)  Skin rash started yesterday.  Today the rash has been improving and fading.  We will continue to watch the patient.  I will see her again in 1 week or sooner if there are new developments or concerns.    (K52.9) Colitis  . She continues to have diarrhea.  She is currently on prednisone 20 mg orally daily.  We will have the patient reevaluated by gastroenterology    The patient is ready to learn, no apparent learning barriers were identified.  Diagnosis and treatment plans were explained to the patient. The patient expressed understanding of the content. The patient asked appropriate questions. The patient questions were answered to her satisfaction.  Time spent 15 minutes more than 50% of the time in counseling and coordination of care  Chart documentation with Dragon Voice recognition Software. Although reviewed after completion, some words and grammatical errors may remain.

## 2018-04-06 DIAGNOSIS — C43.62 MALIGNANT MELANOMA OF LEFT UPPER EXTREMITY INCLUDING SHOULDER (H): Primary | ICD-10-CM

## 2018-04-09 ENCOUNTER — OFFICE VISIT (OUTPATIENT)
Dept: DERMATOLOGY | Facility: CLINIC | Age: 43
End: 2018-04-09
Payer: COMMERCIAL

## 2018-04-09 ENCOUNTER — HOSPITAL ENCOUNTER (OUTPATIENT)
Dept: PHYSICAL THERAPY | Facility: CLINIC | Age: 43
Setting detail: THERAPIES SERIES
End: 2018-04-09
Attending: INTERNAL MEDICINE
Payer: COMMERCIAL

## 2018-04-09 ENCOUNTER — CARE COORDINATION (OUTPATIENT)
Dept: GASTROENTEROLOGY | Facility: CLINIC | Age: 43
End: 2018-04-09

## 2018-04-09 VITALS — OXYGEN SATURATION: 97 % | HEART RATE: 88 BPM | SYSTOLIC BLOOD PRESSURE: 130 MMHG | DIASTOLIC BLOOD PRESSURE: 69 MMHG

## 2018-04-09 DIAGNOSIS — C43.9 METASTATIC MELANOMA (H): ICD-10-CM

## 2018-04-09 DIAGNOSIS — D23.9 DERMAL NEVUS: ICD-10-CM

## 2018-04-09 DIAGNOSIS — L81.4 LENTIGO: ICD-10-CM

## 2018-04-09 DIAGNOSIS — L82.1 SK (SEBORRHEIC KERATOSIS): Primary | ICD-10-CM

## 2018-04-09 PROCEDURE — 99213 OFFICE O/P EST LOW 20 MIN: CPT | Performed by: DERMATOLOGY

## 2018-04-09 PROCEDURE — 40000099 ZZH STATISTIC LYMPHEDEMA VISIT: Performed by: REHABILITATION PRACTITIONER

## 2018-04-09 PROCEDURE — 97140 MANUAL THERAPY 1/> REGIONS: CPT | Mod: GP | Performed by: REHABILITATION PRACTITIONER

## 2018-04-09 NOTE — PROGRESS NOTES
Called and offered pt a 10 cancellation spot for tomorrow.  Pt accepted. Pt recently hospitalized.   [facility] Call Center     Phone Message     May a detailed message be left on voicemail: yes     Reason for Call: Other: DX: Colitis.  Per protocols, pt should be seen within 1 month; however, 1st available is 6/18/18.  Please call pt for sooner appt.      Action Taken: Other: Routed to  GI [facility]

## 2018-04-09 NOTE — NURSING NOTE
"Initial /69  Pulse 88  SpO2 97% Estimated body mass index is 37.41 kg/(m^2) as calculated from the following:    Height as of 4/5/18: 1.6 m (5' 2.99\").    Weight as of 4/5/18: 95.8 kg (211 lb 1.6 oz). .      "

## 2018-04-09 NOTE — LETTER
2018         RE: Doretha Fernandez  [address]  [place] [address]        Dear Colleague,    Thank you for referring your patient, Doretha Fernandez, to the [facility]. Please see a copy of my visit note below.    Doretha Fernandez is a 42 year old year old female patient here today for f/u h xof melanoma unknown primary.  She went to Dr. Cool for node dissection.1 out of 20 nodes positive.  No adjuvant Radiation given no survvial advantage.   MRI of brain and PET scan both clear.   Was on  Nivolumab  got 8 injection but got colitis and is on pred taper waiting to get into GI for possible ant tnf therapy.  .   She denies any new or changing skin lesions.  Patient reports the following modifying factors none.  Associated symptoms: none.  Patient has no other skin complaints today.  Remainder of the HPI, Meds, PMH, Allergies, FH, and SH was reviewed in chart.    Pertinent Hx:                Melanoma metastatic    Past Medical History         Past Medical History:   Diagnosis Date     Abnormal MRI       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      Anxiety       Depression       Lumbago       left lower back pain     Malignant melanoma (H)       Mild persistent asthma       Prothrombin deficiency (H)       takes 81mg asa daily     Stroke (cerebrum) (H)      During      TIA (transient ischemic attack)              Past Surgical History          Past Surgical History:   Procedure Laterality Date     COLONOSCOPY N/A 10/18/2017     Procedure: COLONOSCOPY;  Colon;  Surgeon: Debbie Stephens MD;  Location: [facility] OR     DISSECT LYMPH NODE AXILLA Left 10/23/2017     Procedure: DISSECT LYMPH NODE AXILLA;  Left Axillary Lymph Node Dissection ;  Surgeon: Laurent Cool MD;  Location: [facility] OR     GYN SURGERY              REPAIR MOHS Left 2017     Procedure: REPAIR MOHS;  Left Upper Lid Moh's Reconstruction;  Surgeon: Kisha Bosch MD;  Location:  OR              Family History           Family History   Problem Relation Age of Onset     CANCER Mother 45       lung     Neurologic Disorder Mother       Lipids Father       GASTROINTESTINAL DISEASE Father       Depression Father       CANCER Maternal Grandmother       Blood Disease Maternal Grandmother       Arthritis Maternal Grandmother       DIABETES Maternal Grandmother       Depression Maternal Grandmother       Macular Degeneration Maternal Grandmother       Glaucoma Maternal Grandmother       DIABETES Maternal Grandfather       CEREBROVASCULAR DISEASE Maternal Grandfather       Blood Disease Maternal Grandfather       HEART DISEASE Maternal Grandfather       Glaucoma Maternal Grandfather       CANCER Paternal Grandmother       Cancer - colorectal Paternal Grandmother       Respiratory Paternal Grandfather       Blood Disease Paternal Grandfather       HEART DISEASE Daughter       Asthma Daughter       Depression Sister               Social History    Social History            Social History     Marital status:        Spouse name: N/A     Number of children: N/A     Years of education: N/A          Occupational History     Not on file.             Social History Main Topics     Smoking status: Passive Smoke Exposure - Never Smoker       Last attempt to quit: 3/20/1998     Smokeless tobacco: Never Used     Alcohol use No         Comment: occ     Drug use: No     Sexual activity: Yes       Partners: Male       Birth control/ protection: Surgical           Other Topics Concern     Parent/Sibling W/ Cabg, Mi Or Angioplasty Before 65f 55m? No          Social History Narrative     20 y.o- patient's mother   of lung cancer. She had to take care of her younger sister.     2012- patient's  had a heart attack with stents placed, followed by cardiac rehabilitation     2000 TO 2012  was in [facility] for depression     2013 patient's  went  "through alcohol rehabilitation at [facility]                 They have attended couple counseling a couple of times and patient went to the family program for chemical dependency.     Patient denies alcohol or drug use and herself                 Has 2 children, girls ages 5 and 8     [place] [employer] and also works for the [employer]. 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                         Encounter Medications           Outpatient Encounter Prescriptions as of 1/9/2018   Medication Sig Dispense Refill     venlafaxine (EFFEXOR-ER) 150 MG TB24 24 hr tablet Take 1 tablet (150 mg) by mouth daily (with breakfast) 30 each 0     order for DME Equipment being ordered: 20-30mmHg compression sleeve and 20-30mmHg compression glove\" x2 pair 1 Units 0     rOPINIRole (REQUIP) 0.25 MG tablet Take 1 tablet (0.25 mg) by mouth At Bedtime 30 tablet 11     cephALEXin (KEFLEX) 500 MG capsule Take 2 capsules (1,000 mg) by mouth 2 times daily 24 capsule 0     oxyCODONE IR (ROXICODONE) 5 MG tablet Take 1-3 tablets (5-15 mg) by mouth every 4 hours as needed for pain maximum 12 tablet(s) per day 40 tablet 0     aspirin 81 MG EC tablet Take 81 mg by mouth daily         acetaminophen (TYLENOL) 325 MG tablet Take 2 tablets (650 mg) by mouth every 4 hours as needed for other (mild pain) 100 tablet 0     senna-docusate (SENOKOT-S;PERICOLACE) 8.6-50 MG per tablet Take 1 tablet by mouth 2 times daily as needed for constipation 60 tablet 1     LORazepam (ATIVAN) 1 MG tablet Take 1 tablet (1 mg) by mouth every 8 hours as needed for anxiety 30 tablet 0     Cholecalciferol (VITAMIN D3) 3000 UNITS TABS Take 3,000 Int'l Units by mouth daily 30 tablet 3     zonisamide (ZONEGRAN) 25 MG capsule Take 1 tablet (25 mg) once daily for 1 week, then 2 tablets once daily for 1 week, then 3 tablets once daily for 1 week, then 4 tablets once daily for 1 week. 90 capsule 1                Facility-Administered Encounter Medications as " of 1/9/2018   Medication Dose Route Frequency Provider Last Rate Last Dose     lidocaine 1 % 9 mL  9 mL Intradermal Once Anna Naidu MD           sodium bicarbonate 8.4 % injection 1 mEq  1 mEq Intradermal Once Anna Naidu MD                           Review Of Systems  Skin: As above  Eyes: negative  Ears/Nose/Throat: negative  Respiratory: No shortness of breath, dyspnea on exertion, cough, or hemoptysis  Cardiovascular: negative  Gastrointestinal: negative  Genitourinary: negative  Musculoskeletal: negative  Neurologic: negative  Psychiatric: negative  Hematologic/Lymphatic/Immunologic: negative  Endocrine: negative        O:                                   NAD, WDWN, Alert & Oriented, Mood & Affect wnl, Vitals stable                                         Here today alone                                         /74 (BP Location: Left arm, Patient Position: Sitting, Cuff Size: Adult Large)  Pulse 79  Temp 98.7  F (37.1  C) (Tympanic)                                         General appearance normal                                         Vitals stable                                         Alert, oriented and in no acute distress                                             Following lymph nodes palpated: Occipital, Cervical, Supraclavicular , axilla, inguinal, femoral no lad                                         Stuck on papules and brown macules on trunk and ext                                          Pink papules on trunk              The remainder of the full exam was unremarkable; the following areas were examined:  conjunctiva/lids, oral mucosa, neck, peripheral vascular system, abdomen, lymph nodes, digits/nails, eccrine and apocrine glands, scalp/hair, face, neck, chest, abdomen, buttocks, back, RUE, LUE, RLE, LLE                                                        Eyes: Conjunctivae/lids:Normal                                                     ENT: Lips, buccal mucosa,  tongue: normal                                                    MSK:Normal                                                    Cardiovascular: peripheral edema none                                                    Pulm: Breathing Normal                                                    Lymph Nodes: No Head and Neck Lymphadenopathy                                                     Neuro/Psych: Orientation:Normal; Mood/Affect:Normal        A/P:  1. Metastatic melanoma, seborrheic keratosis, lentigo, dermal nevi  MELANOMA DISCUSSED WITH PATIENT:  I discussed the specifics of tumor, prognosis, metachronous melanoma, self exam, and genetics with the patient. I explained the need for monthly skin exams including and taught the patient how to do this. Patient was asked about new or changing moles and a full skin exam was performed.   BENIGN LESIONS DISCUSSED WITH PATIENT:  I discussed the specifics of tumor, prognosis, and genetics of benign lesions.  I explained that treatment of these lesions would be purely cosmetic and not medically neccessary.  I discussed with patient different removal options including excision, cautery and /or laser.       Nature and genetics of benign skin lesions dicussed with patient.  Signs and Symptoms of skin cancer discussed with patient.  ABCDEs of melanoma reviewed with patient.  Patient encouraged to perform monthly skin exams.  UV precautions reviewed with patient.  Skin care regimen reviewed with patient: Eliminate harsh soaps, i.e. Dial, zest, irsih spring; Mild soaps such as Cetaphil or Dove sensitive skin, avoid hot or cold showers, aggressive use of emollients including vanicream, cetaphil or cerave discussed with patient.    Risks of non-melanoma skin cancer discussed with patient   Return to clinic 3 months      Again, thank you for allowing me to participate in the care of your patient.        Sincerely,        Lowell Bullock MD

## 2018-04-09 NOTE — MR AVS SNAPSHOT
After Visit Summary   4/9/2018    Doretha Fernandez    MRN: [id number]           Patient Information     Date Of Birth          1975        Visit Information        Provider Department      4/9/2018 2:30 PM Lowell Bullock MD [facility]        Today's Diagnoses     SK (seborrheic keratosis)    -  1    Lentigo        Dermal nevus        Metastatic melanoma (H)           Follow-ups after your visit        Your next 10 appointments already scheduled     Apr 12, 2018  9:00 AM CDT   Treatment 60 with Anna Graham PT   [facility]ymp[facility]ema ([facility])    [address] [address]   [phone]            Apr 13, 2018  9:50 AM CDT   LAB with [facility] ([facility])    [address] [address]   [phone]           Please do not eat 10-12 hours before your appointment if you are coming in fasting for labs on lipids, cholesterol, or glucose (sugar). This does not apply to pregnant women. Water, hot tea and black coffee (with nothing added) are okay. Do not drink other fluids, diet soda or chew gum.            Apr 13, 2018 10:45 AM CDT   Return Visit with Kathy Richards MD   [facility] ([facility])    [facility]  [address]  [facility] [address]   [phone]            Apr 17, 2018  2:45 PM CDT   Treatment 60 with Anna Graham PT   [facility] ([facility])    [address] [address]   [phone]            Apr 19, 2018  1:30 PM CDT   Treatment 60 with Anna Graham PT   [facility] ([facility])    [address] [address]   [phone]            Apr 24, 2018  2:30 PM CDT   Treatment 60 with Anna Graham PT   [facility] ([facility])    [address] [place] [address]  [address]  [facility] [address]   [phone]            Apr 26, 2018  2:30 PM CDT   Treatment 60 with Anna Graham, PT   [facility]hedema ([facility])    [address]  [facility] [address]   [phone]            May 01, 2018  2:30 PM CDT   Treatment 60 with Anna Graham PT   [facility]ymp[facility]ema ([facility])    [address] [address]   [phone]            May 03, 2018  1:00 PM CDT   Treatment 60 with Anastasiya Rodriguez PTA   [facility]ymphedema ([facility])    [address] [address]   [phone]            Jun 18, 2018  1:40 PM CDT   (Arrive by 1:25 PM)   INFLAMMATORY BOWEL DISEASE with Eddie Boudreaux MD   [facility] ([facility])    [address] [place] [address] [phone]              Who to contact     If you have questions or need follow up information about today's clinic visit or your schedule please contact [facility] directly at [phone].  Normal or non-critical lab and imaging results will be communicated to you by MyChart, letter or phone within 4 business days after the clinic has received the results. If you do not hear from us within 7 days, please contact the clinic through MyChart or phone. If you have a critical or abnormal lab result, we will notify you by phone as soon as possible.  Submit refill requests through [employer] or call your pharmacy and they will forward the refill request to us. Please allow 3 business days for your refill to be completed.          Additional Information About Your Visit        Apps[employer] Information     [employer] gives you secure access to your electronic health record. If you see a primary care provider, you can also send messages to your care team and make appointments. If you have questions, please call your primary care  clinic.  If you do not have a primary care provider, please call [phone] and they will assist you.        Care EveryWhere ID     This is your Care EveryWhere ID. This could be used by other organizations to access your [facility] medical records  [id number]        Your Vitals Were     Pulse Pulse Oximetry                88 97%           Blood Pressure from Last 3 Encounters:   04/09/18 130/69   04/05/18 120/64   03/29/18 116/75    Weight from Last 3 Encounters:   04/05/18 95.8 kg (211 lb 1.6 oz)   03/29/18 96.9 kg (213 lb 11.2 oz)   03/21/18 97.4 kg (214 lb 12.8 oz)              Today, you had the following     No orders found for display       Primary Care Provider Office Phone # Fax #    Anna Naidu [phone] [address] [facility] [address]        Equal Access to Services     BRIGETTE QUARLES : Hadii aad ku hadasho Soalphonso, waaxda luqadaha, qaybta kaalmada adeegyada, pancho moore . So [facility] [phone].    ATENCIÓN: Si habla español, tiene a mcdonnell disposición servicios gratuitos de asistencia lingüística. Llame al [phone].    We comply with applicable federal civil rights laws and [place] laws. We do not discriminate on the basis of race, color, national origin, age, disability, sex, sexual orientation, or gender identity.            Thank you!     Thank you for choosing [facility]  for your care. Our goal is always to provide you with excellent care. Hearing back from our patients is one way we can continue to improve our services. Please take a few minutes to complete the written survey that you may receive in the mail after your visit with us. Thank you!             Your Updated Medication List - Protect others around you: Learn how to safely use, store and throw away your medicines at www.disposemymeds.org.          This list is accurate as of 4/9/18  2:48 PM.  Always use your most recent med list.                   Brand  "Name Dispense Instructions for use Diagnosis    HYDROmorphone 4 MG tablet    DILAUDID    170 tablet    Take 1-1.5 tablets (4-6 mg) by mouth every 3 hours as needed for moderate to severe pain    Proctocolitis       LORazepam 0.5 MG tablet    ATIVAN    40 tablet    Take 1-2 tablets (0.5-1 mg) by mouth every 6 hours as needed for anxiety or other (nausea)    Proctocolitis, Anxiety state, Metastatic malignant melanoma (H)       ondansetron 8 MG ODT tab    ZOFRAN-ODT    60 tablet    Take 1 tablet (8 mg) by mouth every 8 hours as needed for nausea or vomiting    Proctocolitis, Metastatic malignant melanoma (H)       * order for DME     1 Units    Equipment being ordered: 20-30mmHg compression sleeve and 20-30mmHg compression glove\" x2 pair    Lymphedema of left arm       * order for DME     1 each    Equipment being ordered: x2 Weare[employer] compression shirt    Secondary lymphedema       predniSONE 10 MG tablet    DELTASONE    100 tablet    Take 2 tablets (20 mg) by mouth daily    Malignant melanoma of left upper extremity including shoulder (H), Colitis       rOPINIRole 0.25 MG tablet    REQUIP    30 tablet    Take 1 tablet (0.25 mg) by mouth At Bedtime    Restless leg syndrome       TYLENOL PO      Take 1,000 mg by mouth every 8 hours as needed for mild pain or fever        venlafaxine 150 MG Tb24 24 hr tablet    EFFEXOR-ER    90 each    Take 1 tablet (150 mg) by mouth daily (with breakfast)    Mild major depression (H)       Vitamin D3 3000 UNITS Tabs     30 tablet    Take 3,000 Int'l Units by mouth daily    Vitamin D deficiency       * Notice:  This list has 2 medication(s) that are the same as other medications prescribed for you. Read the directions carefully, and ask your doctor or other care provider to review them with you.      "

## 2018-04-09 NOTE — PROGRESS NOTES
Doretha Fernandez is a 42 year old year old female patient here today for f/u h xof melanoma unknown primary.  She went to Dr. Cool for node dissection.1 out of 20 nodes positive.  No adjuvant Radiation given no survvial advantage.   MRI of brain and PET scan both clear.  Was on  Nivolumab got 8 injection but got colitis and is on pred taper waiting to get into GI for possible ant tnf therapy.  .   She denies any new or changing skin lesions.  Patient reports the following modifying factors none.  Associated symptoms: none.  Patient has no other skin complaints today.  Remainder of the HPI, Meds, PMH, Allergies, FH, and SH was reviewed in chart.    Pertinent Hx:                Melanoma metastatic    Past Medical History         Past Medical History:   Diagnosis Date     Abnormal MRI       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      Anxiety       Depression       Lumbago       left lower back pain     Malignant melanoma (H)       Mild persistent asthma       Prothrombin deficiency (H)       takes 81mg asa daily     Stroke (cerebrum) (H)      During      TIA (transient ischemic attack)              Past Surgical History          Past Surgical History:   Procedure Laterality Date     COLONOSCOPY N/A 10/18/2017     Procedure: COLONOSCOPY;  Colon;  Surgeon: Debbie Stephens MD;  Location: [facility]     DISSECT LYMPH NODE AXILLA Left 10/23/2017     Procedure: DISSECT LYMPH NODE AXILLA;  Left Axillary Lymph Node Dissection ;  Surgeon: Laurent Cool MD;  Location: [facility] OR     GYN SURGERY              REPAIR MOHS Left 2017     Procedure: REPAIR MOHS;  Left Upper Lid Moh's Reconstruction;  Surgeon: Kisha Bosch MD;  Location: [facility]             Family History           Family History   Problem Relation Age of Onset     CANCER Mother 45       lung     Neurologic Disorder Mother       Lipids Father       GASTROINTESTINAL DISEASE Father       Depression Father       CANCER Maternal  Grandmother       Blood Disease Maternal Grandmother       Arthritis Maternal Grandmother       DIABETES Maternal Grandmother       Depression Maternal Grandmother       Macular Degeneration Maternal Grandmother       Glaucoma Maternal Grandmother       DIABETES Maternal Grandfather       CEREBROVASCULAR DISEASE Maternal Grandfather       Blood Disease Maternal Grandfather       HEART DISEASE Maternal Grandfather       Glaucoma Maternal Grandfather       CANCER Paternal Grandmother       Cancer - colorectal Paternal Grandmother       Respiratory Paternal Grandfather       Blood Disease Paternal Grandfather       HEART DISEASE Daughter       Asthma Daughter       Depression Sister               Social History    Social History            Social History     Marital status:        Spouse name: N/A     Number of children: N/A     Years of education: N/A          Occupational History     Not on file.             Social History Main Topics     Smoking status: Passive Smoke Exposure - Never Smoker       Last attempt to quit: 3/20/1998     Smokeless tobacco: Never Used     Alcohol use No         Comment: occ     Drug use: No     Sexual activity: Yes       Partners: Male       Birth control/ protection: Surgical           Other Topics Concern     Parent/Sibling W/ Cabg, Mi Or Angioplasty Before 65f 55m? No          Social History Narrative     20 y.o- patient's mother   of lung cancer. She had to take care of her younger sister.     2012- patient's  had a heart attack with stents placed, followed by cardiac rehabilitation     2000 TO 2012  was in [facility] for [facility]     2013 patient's  went through alcohol rehabilitation at [place] inpatient                 They have attended couple counseling a couple of times and patient went to the family program for chemical dependency.     Patient denies alcohol or drug use and herself        "          Has 2 children, girls ages 5 and 8     [place] [employer] and also works for the [employer]. 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                         Encounter Medications    Outpatient Encounter Prescriptions as of 1/9/2018   Medication Sig Dispense Refill     venlafaxine (EFFEXOR-ER) 150 MG TB24 24 hr tablet Take 1 tablet (150 mg) by mouth daily (with breakfast) 30 each 0     order for DME Equipment being ordered: 20-30mmHg compression sleeve and 20-30mmHg compression glove\" x2 pair 1 Units 0     rOPINIRole (REQUIP) 0.25 MG tablet Take 1 tablet (0.25 mg) by mouth At Bedtime 30 tablet 11     cephALEXin (KEFLEX) 500 MG capsule Take 2 capsules (1,000 mg) by mouth 2 times daily 24 capsule 0     oxyCODONE IR (ROXICODONE) 5 MG tablet Take 1-3 tablets (5-15 mg) by mouth every 4 hours as needed for pain maximum 12 tablet(s) per day 40 tablet 0     aspirin 81 MG EC tablet Take 81 mg by mouth daily         acetaminophen (TYLENOL) 325 MG tablet Take 2 tablets (650 mg) by mouth every 4 hours as needed for other (mild pain) 100 tablet 0     senna-docusate (SENOKOT-S;PERICOLACE) 8.6-50 MG per tablet Take 1 tablet by mouth 2 times daily as needed for constipation 60 tablet 1     LORazepam (ATIVAN) 1 MG tablet Take 1 tablet (1 mg) by mouth every 8 hours as needed for anxiety 30 tablet 0     Cholecalciferol (VITAMIN D3) 3000 UNITS TABS Take 3,000 Int'l Units by mouth daily 30 tablet 3     zonisamide (ZONEGRAN) 25 MG capsule Take 1 tablet (25 mg) once daily for 1 week, then 2 tablets once daily for 1 week, then 3 tablets once daily for 1 week, then 4 tablets once daily for 1 week. 90 capsule 1                Facility-Administered Encounter Medications as of 1/9/2018   Medication Dose Route Frequency Provider Last Rate Last Dose     lidocaine 1 % 9 mL  9 mL Intradermal Once Anna Naidu MD           sodium bicarbonate 8.4 % injection 1 mEq  1 mEq Intradermal Once Anna Naidu MD          "                  Review Of Systems  Skin: As above  Eyes: negative  Ears/Nose/Throat: negative  Respiratory: No shortness of breath, dyspnea on exertion, cough, or hemoptysis  Cardiovascular: negative  Gastrointestinal: negative  Genitourinary: negative  Musculoskeletal: negative  Neurologic: negative  Psychiatric: negative  Hematologic/Lymphatic/Immunologic: negative  Endocrine: negative        O:                                   NAD, WDWN, Alert & Oriented, Mood & Affect wnl, Vitals stable                                         Here today alone                                         /74 (BP Location: Left arm, Patient Position: Sitting, Cuff Size: Adult Large)  Pulse 79  Temp 98.7  F (37.1  C) (Tympanic)                                         General appearance normal                                         Vitals stable                                         Alert, oriented and in no acute distress                                             Following lymph nodes palpated: Occipital, Cervical, Supraclavicular , axilla, inguinal, femoral no lad                                         Stuck on papules and brown macules on trunk and ext                                          Pink papules on trunk              The remainder of the full exam was unremarkable; the following areas were examined:  conjunctiva/lids, oral mucosa, neck, peripheral vascular system, abdomen, lymph nodes, digits/nails, eccrine and apocrine glands, scalp/hair, face, neck, chest, abdomen, buttocks, back, RUE, LUE, RLE, LLE                                                        Eyes: Conjunctivae/lids:Normal                                                     ENT: Lips, buccal mucosa, tongue: normal                                                    MSK:Normal                                                    Cardiovascular: peripheral edema none                                                    Pulm: Breathing Normal                                                     Lymph Nodes: No Head and Neck Lymphadenopathy                                                     Neuro/Psych: Orientation:Normal; Mood/Affect:Normal        A/P:  1. Metastatic melanoma, seborrheic keratosis, lentigo, dermal nevi  MELANOMA DISCUSSED WITH PATIENT:  I discussed the specifics of tumor, prognosis, metachronous melanoma, self exam, and genetics with the patient. I explained the need for monthly skin exams including and taught the patient how to do this. Patient was asked about new or changing moles and a full skin exam was performed.   BENIGN LESIONS DISCUSSED WITH PATIENT:  I discussed the specifics of tumor, prognosis, and genetics of benign lesions.  I explained that treatment of these lesions would be purely cosmetic and not medically neccessary.  I discussed with patient different removal options including excision, cautery and /or laser.       Nature and genetics of benign skin lesions dicussed with patient.  Signs and Symptoms of skin cancer discussed with patient.  ABCDEs of melanoma reviewed with patient.  Patient encouraged to perform monthly skin exams.  UV precautions reviewed with patient.  Skin care regimen reviewed with patient: Eliminate harsh soaps, i.e. Dial, zest, irsih spring; Mild soaps such as Cetaphil or Dove sensitive skin, avoid hot or cold showers, aggressive use of emollients including vanicream, cetaphil or cerave discussed with patient.    Risks of non-melanoma skin cancer discussed with patient   Return to clinic 3 months

## 2018-04-10 ENCOUNTER — APPOINTMENT (OUTPATIENT)
Dept: LAB | Facility: CLINIC | Age: 43
End: 2018-04-10
Payer: COMMERCIAL

## 2018-04-10 ENCOUNTER — OFFICE VISIT (OUTPATIENT)
Dept: GASTROENTEROLOGY | Facility: CLINIC | Age: 43
End: 2018-04-10
Payer: COMMERCIAL

## 2018-04-10 ENCOUNTER — TELEPHONE (OUTPATIENT)
Dept: GASTROENTEROLOGY | Facility: OUTPATIENT CENTER | Age: 43
End: 2018-04-10

## 2018-04-10 ENCOUNTER — HOSPITAL ENCOUNTER (OUTPATIENT)
Dept: LAB | Facility: CLINIC | Age: 43
Discharge: HOME | End: 2018-04-10
Attending: INTERNAL MEDICINE | Admitting: INTERNAL MEDICINE
Payer: COMMERCIAL

## 2018-04-10 VITALS
DIASTOLIC BLOOD PRESSURE: 80 MMHG | TEMPERATURE: 98.2 F | HEIGHT: 63 IN | WEIGHT: 213.9 LBS | BODY MASS INDEX: 37.9 KG/M2 | HEART RATE: 96 BPM | OXYGEN SATURATION: 95 % | SYSTOLIC BLOOD PRESSURE: 117 MMHG

## 2018-04-10 DIAGNOSIS — K52.9 COLITIS: ICD-10-CM

## 2018-04-10 DIAGNOSIS — K52.9 COLITIS: Primary | ICD-10-CM

## 2018-04-10 LAB
ALBUMIN SERPL-MCNC: 4 G/DL (ref 3.4–5)
ALP SERPL-CCNC: 60 U/L (ref 40–150)
ALT SERPL W P-5'-P-CCNC: 39 U/L (ref 0–50)
ANION GAP SERPL CALCULATED.3IONS-SCNC: 6 MMOL/L (ref 3–14)
AST SERPL W P-5'-P-CCNC: 12 U/L (ref 0–45)
BASOPHILS # BLD AUTO: 0.1 10E9/L (ref 0–0.2)
BASOPHILS NFR BLD AUTO: 0.7 %
BILIRUB SERPL-MCNC: 0.1 MG/DL (ref 0.2–1.3)
BUN SERPL-MCNC: 13 MG/DL (ref 7–30)
C COLI+JEJUNI+LARI FUSA STL QL NAA+PROBE: ABNORMAL
C DIFF TOX B STL QL: ABNORMAL
CALCIUM SERPL-MCNC: 9.3 MG/DL (ref 8.5–10.1)
CHLORIDE SERPL-SCNC: 106 MMOL/L (ref 94–109)
CO2 SERPL-SCNC: 28 MMOL/L (ref 20–32)
CREAT SERPL-MCNC: 0.72 MG/DL (ref 0.52–1.04)
CRP SERPL-MCNC: 5.3 MG/L (ref 0–8)
DIFFERENTIAL METHOD BLD: ABNORMAL
EC STX1 GENE STL QL NAA+PROBE: ABNORMAL
EC STX2 GENE STL QL NAA+PROBE: ABNORMAL
ENTERIC PATHOGEN COMMENT: ABNORMAL
EOSINOPHIL # BLD AUTO: 0.1 10E9/L (ref 0–0.7)
EOSINOPHIL NFR BLD AUTO: 0.5 %
ERYTHROCYTE [DISTWIDTH] IN BLOOD BY AUTOMATED COUNT: 14.5 % (ref 10–15)
ERYTHROCYTE [SEDIMENTATION RATE] IN BLOOD BY WESTERGREN METHOD: 11 MM/H (ref 0–20)
G LAMBLIA AG STL QL IA: NORMAL
GFR SERPL CREATININE-BSD FRML MDRD: 89 ML/MIN/1.7M2
GLUCOSE SERPL-MCNC: 96 MG/DL (ref 70–99)
HCT VFR BLD AUTO: 41.2 % (ref 35–47)
HGB BLD-MCNC: 13.5 G/DL (ref 11.7–15.7)
IMM GRANULOCYTES # BLD: 0.2 10E9/L (ref 0–0.4)
IMM GRANULOCYTES NFR BLD: 1.5 %
LYMPHOCYTES # BLD AUTO: 3.1 10E9/L (ref 0.8–5.3)
LYMPHOCYTES NFR BLD AUTO: 22.8 %
MCH RBC QN AUTO: 29.1 PG (ref 26.5–33)
MCHC RBC AUTO-ENTMCNC: 32.8 G/DL (ref 31.5–36.5)
MCV RBC AUTO: 89 FL (ref 78–100)
MONOCYTES # BLD AUTO: 0.9 10E9/L (ref 0–1.3)
MONOCYTES NFR BLD AUTO: 6.9 %
NEUTROPHILS # BLD AUTO: 9.2 10E9/L (ref 1.6–8.3)
NEUTROPHILS NFR BLD AUTO: 67.6 %
NOROV GI+II ORF1-ORF2 JNC STL QL NAA+PR: ABNORMAL
NRBC # BLD AUTO: 0 10*3/UL
NRBC BLD AUTO-RTO: 0 /100
O+P STL MICRO: NORMAL
PLATELET # BLD AUTO: 317 10E9/L (ref 150–450)
POTASSIUM SERPL-SCNC: 3.7 MMOL/L (ref 3.4–5.3)
PROT SERPL-MCNC: 7.7 G/DL (ref 6.8–8.8)
RBC # BLD AUTO: 4.64 10E12/L (ref 3.8–5.2)
RVA NSP5 STL QL NAA+PROBE: ABNORMAL
SALMONELLA SP RPOD STL QL NAA+PROBE: ABNORMAL
SHIGELLA SP+EIEC IPAH STL QL NAA+PROBE: ABNORMAL
SODIUM SERPL-SCNC: 140 MMOL/L (ref 133–144)
SPECIMEN SOURCE: ABNORMAL
SPECIMEN SOURCE: NORMAL
SPECIMEN SOURCE: NORMAL
V CHOL+PARA RFBL+TRKH+TNAA STL QL NAA+PR: ABNORMAL
WBC # BLD AUTO: 13.6 10E9/L (ref 4–11)
Y ENTERO RECN STL QL NAA+PROBE: ABNORMAL

## 2018-04-10 PROCEDURE — 87329 GIARDIA AG IA: CPT | Mod: XU | Performed by: INTERNAL MEDICINE

## 2018-04-10 PROCEDURE — 87177 OVA AND PARASITES SMEARS: CPT | Performed by: INTERNAL MEDICINE

## 2018-04-10 PROCEDURE — 87506 IADNA-DNA/RNA PROBE TQ 6-11: CPT | Performed by: INTERNAL MEDICINE

## 2018-04-10 PROCEDURE — 83993 ASSAY FOR CALPROTECTIN FECAL: CPT | Performed by: INTERNAL MEDICINE

## 2018-04-10 PROCEDURE — 87209 SMEAR COMPLEX STAIN: CPT | Performed by: INTERNAL MEDICINE

## 2018-04-10 PROCEDURE — 87493 C DIFF AMPLIFIED PROBE: CPT | Performed by: INTERNAL MEDICINE

## 2018-04-10 RX ORDER — PREDNISONE 5 MG/1
TABLET ORAL
Qty: 228 TABLET | Refills: 0 | Status: ON HOLD | OUTPATIENT
Start: 2018-04-10 | End: 2018-05-06

## 2018-04-10 RX ORDER — HYDROCORTISONE 100 MG/60ML
SUSPENSION RECTAL
Qty: 24 ENEMA | Refills: 0 | Status: SHIPPED | OUTPATIENT
Start: 2018-04-10 | End: 2018-06-12

## 2018-04-10 ASSESSMENT — ENCOUNTER SYMPTOMS
BOWEL INCONTINENCE: 1
CHILLS: 1
SPEECH CHANGE: 0
POLYPHAGIA: 0
TINGLING: 1
POLYDIPSIA: 1
SKIN CHANGES: 0
HEMOPTYSIS: 0
WEAKNESS: 1
RECTAL PAIN: 1
WHEEZING: 0
INSOMNIA: 1
FATIGUE: 1
DIARRHEA: 1
LOSS OF CONSCIOUSNESS: 0
VOMITING: 0
TASTE DISTURBANCE: 1
SORE THROAT: 0
NIGHT SWEATS: 1
SMELL DISTURBANCE: 0
DEPRESSION: 1
NAUSEA: 1
DIZZINESS: 0
SINUS PAIN: 0
COUGH DISTURBING SLEEP: 0
INCREASED ENERGY: 1
WEIGHT LOSS: 0
PARALYSIS: 0
ALTERED TEMPERATURE REGULATION: 1
DISTURBANCES IN COORDINATION: 0
POSTURAL DYSPNEA: 1
NUMBNESS: 1
HOARSE VOICE: 0
BLOOD IN STOOL: 1
DECREASED APPETITE: 1
WEIGHT GAIN: 1
SINUS CONGESTION: 0
DYSPNEA ON EXERTION: 1
TREMORS: 0
ABDOMINAL PAIN: 1
DECREASED CONCENTRATION: 1
HEADACHES: 1
BLOATING: 1
CONSTIPATION: 1
MEMORY LOSS: 1
NAIL CHANGES: 0
HALLUCINATIONS: 0
SPUTUM PRODUCTION: 0
SNORES LOUDLY: 0
COUGH: 0
JAUNDICE: 0
HEARTBURN: 0
NECK MASS: 0
TROUBLE SWALLOWING: 0
FEVER: 0
SEIZURES: 0
NERVOUS/ANXIOUS: 1
POOR WOUND HEALING: 0
SHORTNESS OF BREATH: 1
PANIC: 1

## 2018-04-10 ASSESSMENT — PAIN SCALES - GENERAL: PAINLEVEL: MILD PAIN (3)

## 2018-04-10 NOTE — TELEPHONE ENCOUNTER
Patient taking any blood thinners ? no    Heart disease ? denies    Lung disease ? Asthma. Advised patient to bring inhaler      Sleep apnea ? denies    Diabetic ? denies    Kidney disease ? denies    Dialysis ? n/a    Electronic implanted medical devices ? denies    Are you taking any narcotic pain medication ? Dilaudid  What is your daily dosage ?    PTSD ? n/a    Prep instructions reviewed with patient ? Instructions,  policy, MAC sedation plan reviewed. Advised patient to have someone stay with her post exam    Pharmacy : n/a    Indication for procedure : Colitis    Referring provider : Dr. Renard Davey     Arrival Time :Patient will arrive at 7:30 AM

## 2018-04-10 NOTE — PATIENT INSTRUCTIONS
It is good to meet you today!    We will work to try to help control your colitis.    We will check labs and stool studies today  Lab tests today at the 1st floor -- you will be notified of results by letter or my chart message in 7-10 days.  You will receive a phone call if more urgent follow up is needed.    You may  your stool sample containers at Lab 1st Floor  before you leave today.  You may drop off the stool samples at any [facility]    Increase your prednisone to 40 mg daily - we will give a new taper    Schedule a flexible sigmoidoscopy  You are scheduled on April 11  Check in time is 730   [place] [facility]  [address] [facility].       Take steroid enemas at bedtime    We will make further recommendations after have the above information    Follow up in 4 weeks with Zeferino Sanchez

## 2018-04-10 NOTE — PROGRESS NOTES
Note dictated. Job code [id number].    Renard Davey MD    [facility]  Division of Gastroenterology, Hepatology and Nutrition    Answers for HPI/ROS submitted by the patient on 4/10/2018   General Symptoms: Yes  Skin Symptoms: Yes  HENT Symptoms: Yes  EYE SYMPTOMS: No  HEART SYMPTOMS: No  LUNG SYMPTOMS: Yes  INTESTINAL SYMPTOMS: Yes  URINARY SYMPTOMS: No  GYNECOLOGIC SYMPTOMS: No  BREAST SYMPTOMS: No  SKELETAL SYMPTOMS: No  BLOOD SYMPTOMS: No  NERVOUS SYSTEM SYMPTOMS: Yes  MENTAL HEALTH SYMPTOMS: Yes  Fever: No  Loss of appetite: Yes  Weight loss: No  Weight gain: Yes  Fatigue: Yes  Night sweats: Yes  Chills: Yes  Increased stress: Yes  Excessive hunger: No  Excessive thirst: Yes  Feeling hot or cold when others believe the temperature is normal: Yes  Loss of height: No  Post-operative complications: No  Surgical site pain: No  Hallucinations: No  Change in or Loss of Energy: Yes  Hyperactivity: No  Confusion: No  Changes in hair: No  Changes in moles/birth marks: No  Itching: Yes  Rashes: No  Changes in nails: No  Acne: Yes  Hair in places you don't want it: No  Change in facial hair: No  Warts: No  Non-healing sores: No  Scarring: No  Flaking of skin: No  Color changes of hands/feet in cold : No  Sun sensitivity: No  Skin thickening: No  Ear pain: No  Ear discharge: No  Hearing loss: No  Tinnitus: No  Nosebleeds: No  Congestion: No  Sinus pain: No  Trouble swallowing: No   Voice hoarseness: No  Mouth sores: No  Sore throat: No  Tooth pain: No  Gum tenderness: No  Bleeding gums: No  Change in taste: Yes  Change in sense of smell: No  Dry mouth: No  Hearing aid used: No  Neck lump: No  Cough: No  Sputum or phlegm: No  Coughing up blood: No  Difficulty breating or shortness of breath: Yes  Snoring: No  Wheezing: No  Difficulty breathing on exertion: Yes  Nighttime Cough: No  Difficulty breathing when lying flat: Yes  Heart burn or indigestion: No  Nausea: Yes  Vomiting: No  Abdominal  pain: Yes  Bloating: Yes  Constipation: Yes  Diarrhea: Yes  Blood in stool: Yes  Black stools: No  Rectal or Anal pain: Yes  Fecal incontinence: Yes  Yellowing of skin or eyes: No  Vomit with blood: No  Change in stools: Yes  Trouble with coordination: No  Dizziness or trouble with balance: No  Fainting or black-out spells: No  Memory loss: Yes  Headache: Yes  Seizures: No  Speech problems: No  Tingling: Yes  Tremor: No  Weakness: Yes  Difficulty walking: No  Paralysis: No  Numbness: Yes  Nervous or Anxious: Yes  Depression: Yes  Trouble sleeping: Yes  Trouble thinking or concentrating: Yes  Mood changes: Yes  Panic attacks: Yes

## 2018-04-10 NOTE — LETTER
4/10/2018       RE: Doretha Fernandez  [address]  [place] [address]     Dear Colleague,    Thank you for referring your patient, Doretha Fernandez, to the [facility] at [facility]. Please see a copy of my visit note below.    Note dictated. Job code [id number].    Service Date: 04/10/2018      OUTPATIENT GI CONSULT      REFERRING PROVIDER:  Kathy Richards MD.      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      CHIEF COMPLAINT:  Bloody diarrhea.      HISTORY OF PRESENT ILLNESS:  Doretha is a very pleasant 43-year-old female with a history of metastatic melanoma with unknown primary.  She has been treated by the check-point inhibitor Opdivo.  After having 8 doses of the Opdivo, she developed abdominal pain as well as bloody diarrhea up to 15 times per day and left-sided abdominal pain.  She had a CT scan which showed colitis of the colon.  She was admitted to the hospital.  Stool studies were performed and she was found to be positive for Giardia.  She was treated for Giardia and subsequently underwent a flexible sigmoidoscopy to the transverse colon.  Moderate inflammation was found from the rectum to the transverse colon.  Biopsies showed predominant acute colitis, although there were some chronic changes.  All in all it was felt this was consistent with check-point inhibitor induced colitis.  The patient was started on 100 mg of prednisone.  She says that after being started on prednisone that she has had some improvement in the diarrhea, though this still has persisted.  Instead of going 15 times per day, she goes anywhere from 5-10 times per day.  She occasionally has nocturnal symptoms.  She does have persistent blood in her stool.  She describes significant tenesmus with feeling that she has to go to the bathroom but nothing comes out or only mucus and blood comes out.  She continues to have some left-sided  abdominal discomfort, although this is improved.  Her oncologist has titrated her prednisone down from 100 to 50 mg and then down to 20 mg.  She does not think things have gotten worse since going down to 20 mg.  She has not had any significant weight loss.  She does note that certain dietary things make her symptoms worse.  If she eats red meats or raw vegetables or milk products that it definitely makes things worse.      She is very emotional today because she is concerned that she may not be able to go back on the Opdivo and she views this as her only possible treatment options for her metastatic melanoma.  She has not gotten a clearance about whether she would be able to restart it from her oncologist.      REVIEW OF SYSTEMS:  A complete review of systems performed.  Pertinent positives and negatives are as stated above in the HPI.  The remainder of a complete review of systems is unremarkable.      PAST MEDICAL HISTORY:   1.  Metastatic melanoma with unknown primary.  She has had it in her lymph nodes, but they did not find a primary lesion.   2.  Mild persistent asthma.   3.  Colitis, likely due to check-point inhibitor.   4.  Significant anxiety.  She does report significant anxiety in particularly related to her health over the last 6 months.   5.  Prothrombin deficiency.   6.  Remote history of CVA versus TIA during her  16 years.      FAMILY HISTORY:  Father with history of colon polyps.  Paternal grandmother with history of colon cancer in her 50s or 60s.  Father did also history of diverticulitis as well as IBS.  Her child has been diagnosed with IBS.  No history of Crohn's disease.  No history of ulcerative colitis.      SOCIAL HISTORY:  She is currently on leave due to her health issues.  No history of tobacco.  She very occasionally drinks alcohol but nothing significant.  No other illicit drugs including marijuana.      MEDICATIONS:   1.  She last took Opdivo at the end of February.   2.   Prednisone 20 mg as above.     3.  Effexor.   4.  Lorazepam.   5.  Hydromorphone 4 mg tablet every 3 hours.   6.  Zofran.   7.  Acetaminophen.   8.  Requip.   9.  Cholecalciferol.      PHYSICAL EXAMINATION:   VITAL SIGNS:  Weight 213.9 pounds.  Height is 5 feet 2.9 inches, satting 95% on room air, pulse is 96, temperature is 98.2, blood pressure is 117/80.   GENERAL:  She is pleasant.  She is not in any acute distress.  She is quite emotional and at times tearing up throughout the exam.   HEENT:  Head is atraumatic, normocephalic.  Sclerae are anicteric without injection.  Oropharynx is clear with moist mucous membranes.   NECK:  Supple, no lymphadenopathy, no thyromegaly.   HEART:  Normal rate.   LUNGS:  Breathing comfortably.   ABDOMEN:  Obese, soft, nontender, nondistended, no rebound or guarding.   EXTREMITIES:  No clubbing, cyanosis or edema.   SKIN:  No evidence of rash.   JOINTS:  No evidence of synovitis.   NEUROLOGIC:  Awake, alert and oriented x3 with no focal deficits.      LABORATORY DATA:  I reviewed her labs from 03/29 which showed sodium, normal creatinine at 0.8 and ALT mildly elevated at 81.  The rest of the LFTs are normal including bilirubin 30.6.  CBC, she did have a leukocytosis, but this resolved.  Her white count was 10.81, hemoglobin 13.1 and platelets of 318,000.      IMAGING:  CT reviewed with inflammation of the rectum and sigmoid.  Colonoscopy reviewed as above.      ASSESSMENT AND PLAN:  Doretha Fernandez is a very pleasant 43-year-old female with a history of metastatic melanoma (with unknown primary) who was treated with the check-point inhibitor Opdivo.  She developed colitis that is likely due to this drug.  This is a difficult situation as she has developed significant colitis that is likely due to one of her best treatment options for the metastatic melanoma.  Her symptoms have not resolved by holding the medication and even after significant steroids they have not resolved completely.   First we will ensure that her persistent symptoms are due to actual persistent colitis. To determine this we will obtain a flexible sigmoidoscopy tomorrow. We will also check stool cultures to ensure there is no infection (including c diff, giardia, o and p, enteric panel). It is noted that she has a history of 2 positive giardia antigens - she is s/p treatment for this.     If there is significant inflammation present in her colon on the flex sig and there is no evidence of infection, I will talk with Dr. Richards about initiating infliximab therapy or possibly vedolizumab.  If we do require biologic therapy, I think it may be very likely that she will not be tolerate check-point inhibitor therapy.  I will need to discuss this with Dr. Richards.  We will also have to discuss using infliximab therapy when we know that she has a recent history of a metastatic melanoma as this medication can increase her risk potentially for recurrence of melanoma.  All of this will need to be discussed with the patient and with Dr. Richards prior to starting therapy.  If she does not respond to any medical therapy, then consideration for surgical colectomy would need to be considered and this can also be considered if she is not a candidate for medical therapy as above.      Thank you very much for the opportunity to take part in the care of this patient.  Please do not hesitate to call with questions.         D: 04/10/2018   T: 04/10/2018   MT: DAREK      Name:     ELIZABETH MOREIRA   MRN:      [id number]        Account:      [id number]   :      1975           Service Date: 04/10/2018      Document: [id number]        Again, thank you for allowing me to participate in the care of your patient.      Sincerely,    Renard Davey MD    cc:  Kathy Richards MD, Oncology at the [facility].

## 2018-04-10 NOTE — PROGRESS NOTES
Service Date: 04/10/2018      OUTPATIENT GI CONSULT      REFERRING PROVIDER:  Kathy Richards MD.      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      CHIEF COMPLAINT:  Bloody diarrhea.      HISTORY OF PRESENT ILLNESS:  Doretha is a very pleasant 43-year-old female with a history of metastatic melanoma with unknown primary.  She has been treated by the check-point inhibitor Opdivo.  After having 8 doses of the Opdivo, she developed abdominal pain as well as bloody diarrhea up to 15 times per day and left-sided abdominal pain.  She had a CT scan which showed colitis of the colon.  She was admitted to the hospital.  Stool studies were performed and she was found to be positive for Giardia.  She was treated for Giardia and subsequently underwent a flexible sigmoidoscopy to the transverse colon.  Moderate inflammation was found from the rectum to the transverse colon.  Biopsies showed predominant acute colitis, although there were some chronic changes.  All in all it was felt this was consistent with check-point inhibitor induced colitis.  The patient was started on 100 mg of prednisone.  She says that after being started on prednisone that she has had some improvement in the diarrhea, though this still has persisted.  Instead of going 15 times per day, she goes anywhere from 5-10 times per day.  She occasionally has nocturnal symptoms.  She does have persistent blood in her stool.  She describes significant tenesmus with feeling that she has to go to the bathroom but nothing comes out or only mucus and blood comes out.  She continues to have some left-sided abdominal discomfort, although this is improved.  Her oncologist has titrated her prednisone down from 100 to 50 mg and then down to 20 mg.  She does not think things have gotten worse since going down to 20 mg.  She has not had any significant weight loss.  She does note that certain dietary things make her symptoms worse.  If she  eats red meats or raw vegetables or milk products that it definitely makes things worse.      She is very emotional today because she is concerned that she may not be able to go back on the Opdivo and she views this as her only possible treatment options for her metastatic melanoma.  She has not gotten a clearance about whether she would be able to restart it from her oncologist.      REVIEW OF SYSTEMS:  A complete review of systems performed.  Pertinent positives and negatives are as stated above in the HPI.  The remainder of a complete review of systems is unremarkable.      PAST MEDICAL HISTORY:   1.  Metastatic melanoma with unknown primary.  She has had it in her lymph nodes, but they did not find a primary lesion.   2.  Mild persistent asthma.   3.  Colitis, likely due to check-point inhibitor.   4.  Significant anxiety.  She does report significant anxiety in particularly related to her health over the last 6 months.   5.  Prothrombin deficiency.   6.  Remote history of CVA versus TIA during her  16 years.      FAMILY HISTORY:  Father with history of colon polyps.  Paternal grandmother with history of colon cancer in her 50s or 60s.  Father did also history of diverticulitis as well as IBS.  Her child has been diagnosed with IBS.  No history of Crohn's disease.  No history of ulcerative colitis.      SOCIAL HISTORY:  She is currently on leave due to her health issues.  No history of tobacco.  She very occasionally drinks alcohol but nothing significant.  No other illicit drugs including marijuana.      MEDICATIONS:   1.  She last took Opdivo at the end of February.   2.  Prednisone 20 mg as above.     3.  Effexor.   4.  Lorazepam.   5.  Hydromorphone 4 mg tablet every 3 hours.   6.  Zofran.   7.  Acetaminophen.   8.  Requip.   9.  Cholecalciferol.      PHYSICAL EXAMINATION:   VITAL SIGNS:  Weight 213.9 pounds.  Height is 5 feet 2.9 inches, satting 95% on room air, pulse is 96, temperature is 98.2,  blood pressure is 117/80.   GENERAL:  She is pleasant.  She is not in any acute distress.  She is quite emotional and at times tearing up throughout the exam.   HEENT:  Head is atraumatic, normocephalic.  Sclerae are anicteric without injection.  Oropharynx is clear with moist mucous membranes.   NECK:  Supple, no lymphadenopathy, no thyromegaly.   HEART:  Normal rate.   LUNGS:  Breathing comfortably.   ABDOMEN:  Obese, soft, nontender, nondistended, no rebound or guarding.   EXTREMITIES:  No clubbing, cyanosis or edema.   SKIN:  No evidence of rash.   JOINTS:  No evidence of synovitis.   NEUROLOGIC:  Awake, alert and oriented x3 with no focal deficits.      LABORATORY DATA:  I reviewed her labs from 03/29 which showed sodium, normal creatinine at 0.8 and ALT mildly elevated at 81.  The rest of the LFTs are normal including bilirubin 30.6.  CBC, she did have a leukocytosis, but this resolved.  Her white count was 10.81, hemoglobin 13.1 and platelets of 318,000.      IMAGING:  CT reviewed with inflammation of the rectum and sigmoid.  Colonoscopy reviewed as above.      ASSESSMENT AND PLAN:  Doretha Frenandez is a very pleasant 43-year-old female with a history of metastatic melanoma (with unknown primary) who was treated with the check-point inhibitor Opdivo.  She developed colitis that is likely due to this drug.  This is a difficult situation as she has developed significant colitis that is likely due to one of her best treatment options for the metastatic melanoma.  Her symptoms have not resolved by holding the medication and even after significant steroids they have not resolved completely.  First we will ensure that her persistent symptoms are due to actual persistent colitis. To determine this we will obtain a flexible sigmoidoscopy tomorrow. We will also check stool cultures to ensure there is no infection (including c diff, giardia, o and p, enteric panel). It is noted that she has a history of 2 positive giardia  antigens - she is s/p treatment for this.     If there is significant inflammation present in her colon on the flex sig and there is no evidence of infection, I will talk with Dr. Richards about initiating infliximab therapy or possibly vedolizumab.  If we do require biologic therapy, I think it may be very likely that she will not be tolerate check-point inhibitor therapy.  I will need to discuss this with Dr. Richards.  We will also have to discuss using infliximab therapy when we know that she has a recent history of a metastatic melanoma as this medication can increase her risk potentially for recurrence of melanoma.  All of this will need to be discussed with the patient and with Dr. Richards prior to starting therapy.  If she does not respond to any medical therapy, then consideration for surgical colectomy would need to be considered and this can also be considered if she is not a candidate for medical therapy as above.      Thank you very much for the opportunity to take part in the care of this patient.  Please do not hesitate to call with questions.      cc:  Kathy Richards MD, Oncology at the [facility].         NICOLASA ANDINO MD             D: 04/10/2018   T: 04/10/2018   MT: DAREK      Name:     ELIZABETH MOREIRA   MRN:      [id number]        Account:      [id number]   :      1975           Service Date: 04/10/2018      Document: [id number]

## 2018-04-10 NOTE — NURSING NOTE
"Chief Complaint   Patient presents with     Colitis       Vitals:    04/10/18 0946   BP: 117/80   BP Location: Left arm   Patient Position: Chair   Cuff Size: Adult Large   Pulse: 96   Temp: 98.2  F (36.8  C)   SpO2: 95%   Weight: 213 lb 14.4 oz   Height: 5' 2.99\"       Body mass index is 37.9 kg/(m^2).      Francoise Moreno                          "

## 2018-04-10 NOTE — MR AVS SNAPSHOT
After Visit Summary   4/10/2018    Doretha Fernandez    MRN: [id number]           Patient Information     Date Of Birth          1975        Visit Information        Provider Department      4/10/2018 10:00 AM Renard Davey MD [facility]        Today's Diagnoses     Colitis    -  1      Care Instructions    It is good to meet you today!    We will work to try to help control your colitis.    We will check labs and stool studies today  Lab tests today at the 1st floor -- you will be notified of results by letter or my chart message in 7-10 days.  You will receive a phone call if more urgent follow up is needed.    You may  your stool sample containers at Lab 1st Floor  before you leave today.  You may drop off the stool samples at any [facility]    Increase your prednisone to 40 mg daily - we will give a new taper    Schedule a flexible sigmoidoscopy  You are scheduled on April 11  Check in time is 730   [place] [facility]  [address] [facility].       Take steroid enemas at bedtime    We will make further recommendations after have the above information    Follow up in 4 weeks with Zeferino Sanchez            Follow-ups after your visit        Additional Services     GASTROENTEROLOGY ADULT REF PROCEDURE ONLY [facility] [phone]       Last Lab Result: Creatinine (mg/dL)       Date                     Value                 03/29/2018               0.81             ----------  Body mass index is 37.9 kg/(m^2).     Needed:  No  Language:  English    Patient will be contacted to schedule procedure.     Please be aware that coverage of these services is subject to the terms and limitations of your health insurance plan.  Call member services at your health plan with any benefit or coverage questions.  Any procedures must be performed at a [facility] facility OR coordinated by your clinic's referral office.    Please bring the  following with you to your appointment:    (1) Any X-Rays, CTs or MRIs which have been performed.  Contact the facility where they were done to arrange for  prior to your scheduled appointment.    (2) List of current medications   (3) This referral request   (4) Any documents/labs given to you for this referral                  Your next 10 appointments already scheduled     Apr 11, 2018  8:30 AM CDT   Flexible Sigmoidoscopy with Renard Davey MD   [facility] ([facility])    [address] [place] [address]   [phone]            Apr 12, 2018  9:00 AM CDT   Treatment 60 with Anna Graham PT   [facility] ([facility])    [address] [facility] [address]   [phone]            Apr 13, 2018  9:50 AM CDT   LAB with [facility] ([facility])    [address] [facility] [address]   [phone]           Please do not eat 10-12 hours before your appointment if you are coming in fasting for labs on lipids, cholesterol, or glucose (sugar). This does not apply to pregnant women. Water, hot tea and black coffee (with nothing added) are okay. Do not drink other fluids, diet soda or chew gum.            Apr 13, 2018 10:45 AM CDT   Return Visit with Kathy Richards MD   [facility] ([facility])    [facility]  [address]  [facility] [address]   [phone]            Apr 17, 2018  2:45 PM CDT   Treatment 60 with Anna Graham PT   [facility] ([facility])    [address] [facility] [address]   [phone]            Apr 19, 2018  1:30 PM CDT   Treatment 60 with Anna Graham PT   [facility] ([facility])    [address] [facility] [address]   [phone]            Apr 24, 2018  2:30 PM CDT    Treatment 60 with Anna Graham, PT   [facility] ([facility])    [address] [address]   [phone]            Apr 26, 2018  2:30 PM CDT   Treatment 60 with Anna Graham, PT   [facility] ([facility])    [address] [facility] [place] [address]   [phone]            May 01, 2018  2:30 PM CDT   Treatment 60 with Anna Graham, PT   [facility] ([facility])    [address] [facility] [place] [address]   [phone]            May 03, 2018  1:00 PM CDT   Treatment 60 with Anastasiya Rodriguez PTA   [facility] ([facility])    [address] [address]   [phone]              Future tests that were ordered for you today     Open Future Orders        Priority Expected Expires Ordered    Clostridium difficile toxin B PCR Routine  4/10/2019 4/10/2018    Enteric Bacteria and Virus Panel by [facility] Stool Routine  4/10/2019 4/10/2018    Giardia antigen Routine  4/10/2019 4/10/2018    Ova and Parasite Exam Routine Routine  4/10/2019 4/10/2018    Calprotectin Feces Routine  4/10/2019 4/10/2018            Who to contact     Please call your clinic at [phone] to:    Ask questions about your health    Make or cancel appointments    Discuss your medicines    Learn about your test results    Speak to your doctor            Additional Information About Your Visit        [employer] Information     [employer] gives you secure access to your electronic health record. If you see a primary care provider, you can also send messages to your care team and make appointments. If you have questions, please call your primary care clinic.  If you do not have a primary care provider, please call [phone] and they will assist you.      [employer] is an electronic gateway that provides easy, online access to your medical records. With [employer], you can request a  "clinic appointment, read your test results, renew a prescription or communicate with your care team.     To access your existing account, please contact your [facility] or call [phone] for assistance.        Care EveryWhere ID     This is your Care EveryWhere ID. This could be used by other organizations to access your [facility] medical records  [id number]        Your Vitals Were     Pulse Temperature Height Pulse Oximetry BMI (Body Mass Index)       96 98.2  F (36.8  C) 1.6 m (5' 2.99\") 95% 37.9 kg/m2        Blood Pressure from Last 3 Encounters:   04/10/18 117/80   04/09/18 130/69   04/05/18 120/64    Weight from Last 3 Encounters:   04/10/18 97 kg (213 lb 14.4 oz)   04/05/18 95.8 kg (211 lb 1.6 oz)   03/29/18 96.9 kg (213 lb 11.2 oz)              We Performed the Following     CBC with platelets differential     Comprehensive metabolic panel     CRP inflammation     Erythrocyte sedimentation rate auto     GASTROENTEROLOGY ADULT REF PROCEDURE ONLY [facility] [phone]          Today's Medication Changes          These changes are accurate as of 4/10/18 11:04 AM.  If you have any questions, ask your nurse or doctor.               Start taking these medicines.        Dose/Directions    hydrocortisone 100 MG/60ML enema   Commonly known as:  CORTENEMA   Used for:  Colitis   Started by:  Renard Davey MD        1 enema every night for 21 days; then 1 enema every other night for 1 week and then stop   Quantity:  24 enema   Refills:  0         These medicines have changed or have updated prescriptions.        Dose/Directions    * predniSONE 10 MG tablet   Commonly known as:  DELTASONE   This may have changed:  Another medication with the same name was added. Make sure you understand how and when to take each.   Used for:  Malignant melanoma of left upper extremity including shoulder (H), Colitis   Changed by:  Renard Davey MD        Dose:  20 mg "   Take 2 tablets (20 mg) by mouth daily   Quantity:  100 tablet   Refills:  1       * predniSONE 5 MG tablet   Commonly known as:  DELTASONE   This may have changed:  You were already taking a medication with the same name, and this prescription was added. Make sure you understand how and when to take each.   Used for:  Colitis   Changed by:  Renard Davey MD        Take 8 tabs daily (40 mg) for 2 weeks, then decrease by 2 tabs daily (10 mg) every week until at 4 tabs daily (20 mg), then decrease by 1 tab daily (5 mg) every week until 1 tablet daily (5 mg) then 1 tablet (5mg) every other day for 1 week   Quantity:  228 tablet   Refills:  0       * Notice:  This list has 2 medication(s) that are the same as other medications prescribed for you. Read the directions carefully, and ask your doctor or other care provider to review them with you.         Where to get your medicines      These medications were sent to [address] [place] [address] [address] [address]    Hours:  TRANSPLANT PHONE NUMBER [phone] Phone:  [phone]     hydrocortisone 100 MG/60ML enema         Some of these will need a paper prescription and others can be bought over the counter.  Ask your nurse if you have questions.     Bring a paper prescription for each of these medications     predniSONE 5 MG tablet                Primary Care Provider Office Phone # Fax #    Anna Naidu [phone] [address] [facility] [address]        Equal Access to Services     [facility] HospitalANNE AH: Hadii aad ku hadasho Soomaali, waaxda luqadaha, qaybta kaalmada adeegyada, waxay bernice castillo. So [facility] [phone].    ATENCIÓN: Si habla español, tiene a mcdonnell disposición servicios gratuitos de asistencia lingüística. Llame al [phone].    We comply with applicable federal civil rights laws and [place] laws. We do  "not discriminate on the basis of race, color, national origin, age, disability, sex, sexual orientation, or gender identity.            Thank you!     Thank you for choosing [facility]  for your care. Our goal is always to provide you with excellent care. Hearing back from our patients is one way we can continue to improve our services. Please take a few minutes to complete the written survey that you may receive in the mail after your visit with us. Thank you!             Your Updated Medication List - Protect others around you: Learn how to safely use, store and throw away your medicines at www.disposemymeds.org.          This list is accurate as of 4/10/18 11:04 AM.  Always use your most recent med list.                   Brand Name Dispense Instructions for use Diagnosis    hydrocortisone 100 MG/60ML enema    CORTENEMA    24 enema    1 enema every night for 21 days; then 1 enema every other night for 1 week and then stop    Colitis       HYDROmorphone 4 MG tablet    DILAUDID    170 tablet    Take 1-1.5 tablets (4-6 mg) by mouth every 3 hours as needed for moderate to severe pain    Proctocolitis       LORazepam 0.5 MG tablet    ATIVAN    40 tablet    Take 1-2 tablets (0.5-1 mg) by mouth every 6 hours as needed for anxiety or other (nausea)    Proctocolitis, Anxiety state, Metastatic malignant melanoma (H)       ondansetron 8 MG ODT tab    ZOFRAN-ODT    60 tablet    Take 1 tablet (8 mg) by mouth every 8 hours as needed for nausea or vomiting    Proctocolitis, Metastatic malignant melanoma (H)       * order for DME     1 Units    Equipment being ordered: 20-30mmHg compression sleeve and 20-30mmHg compression glove\" x2 pair    Lymphedema of left arm       * order for DME     1 each    Equipment being ordered: x2 Wearease compression shirt    Secondary lymphedema       * predniSONE 10 MG tablet    DELTASONE    100 tablet    Take 2 tablets (20 mg) by mouth daily    Malignant melanoma of " left upper extremity including shoulder (H), Colitis       * predniSONE 5 MG tablet    DELTASONE    228 tablet    Take 8 tabs daily (40 mg) for 2 weeks, then decrease by 2 tabs daily (10 mg) every week until at 4 tabs daily (20 mg), then decrease by 1 tab daily (5 mg) every week until 1 tablet daily (5 mg) then 1 tablet (5mg) every other day for 1 week    Colitis       rOPINIRole 0.25 MG tablet    REQUIP    30 tablet    Take 1 tablet (0.25 mg) by mouth At Bedtime    Restless leg syndrome       TYLENOL PO      Take 1,000 mg by mouth every 8 hours as needed for mild pain or fever        venlafaxine 150 MG Tb24 24 hr tablet    EFFEXOR-ER    90 each    Take 1 tablet (150 mg) by mouth daily (with breakfast)    Mild major depression (H)       Vitamin D3 3000 UNITS Tabs     30 tablet    Take 3,000 Int'l Units by mouth daily    Vitamin D deficiency       * Notice:  This list has 4 medication(s) that are the same as other medications prescribed for you. Read the directions carefully, and ask your doctor or other care provider to review them with you.

## 2018-04-11 ENCOUNTER — TRANSFERRED RECORDS (OUTPATIENT)
Dept: HEALTH INFORMATION MANAGEMENT | Facility: CLINIC | Age: 43
End: 2018-04-11

## 2018-04-11 ENCOUNTER — DOCUMENTATION ONLY (OUTPATIENT)
Dept: GASTROENTEROLOGY | Facility: OUTPATIENT CENTER | Age: 43
End: 2018-04-11
Payer: COMMERCIAL

## 2018-04-11 LAB
C COLI+JEJUNI+LARI FUSA STL QL NAA+PROBE: NOT DETECTED
C DIFF TOX B STL QL: NEGATIVE
EC STX1 GENE STL QL NAA+PROBE: NOT DETECTED
EC STX2 GENE STL QL NAA+PROBE: NOT DETECTED
ENTERIC PATHOGEN COMMENT: NORMAL
G LAMBLIA AG STL QL IA: NORMAL
NOROV GI+II ORF1-ORF2 JNC STL QL NAA+PR: NOT DETECTED
O+P STL MICRO: NORMAL
RVA NSP5 STL QL NAA+PROBE: NOT DETECTED
SALMONELLA SP RPOD STL QL NAA+PROBE: NOT DETECTED
SHIGELLA SP+EIEC IPAH STL QL NAA+PROBE: NOT DETECTED
SPECIMEN SOURCE: NORMAL
V CHOL+PARA RFBL+TRKH+TNAA STL QL NAA+PR: NOT DETECTED
Y ENTERO RECN STL QL NAA+PROBE: NOT DETECTED

## 2018-04-11 NOTE — PROGRESS NOTES
Patient called to report that she is to start steroid enemas for 21 days and will be on high dose prednisone therapy.  She reports having 30+ episodes of diarrhea per day.  She is requesting a letter for her work to keep her out for another 30 days. OK for letter per Dr. Richards. Physical therapy will need to update work restrictions and provide these to her employer.  Pt is aware.

## 2018-04-12 ENCOUNTER — HOSPITAL ENCOUNTER (OUTPATIENT)
Dept: PHYSICAL THERAPY | Facility: CLINIC | Age: 43
Setting detail: THERAPIES SERIES
End: 2018-04-12
Attending: INTERNAL MEDICINE
Payer: COMMERCIAL

## 2018-04-12 PROCEDURE — 97140 MANUAL THERAPY 1/> REGIONS: CPT | Mod: GP | Performed by: PHYSICAL THERAPIST

## 2018-04-12 PROCEDURE — 97110 THERAPEUTIC EXERCISES: CPT | Mod: GP | Performed by: PHYSICAL THERAPIST

## 2018-04-12 PROCEDURE — 40000099 ZZH STATISTIC LYMPHEDEMA VISIT: Performed by: PHYSICAL THERAPIST

## 2018-04-13 ENCOUNTER — ONCOLOGY VISIT (OUTPATIENT)
Dept: ONCOLOGY | Facility: CLINIC | Age: 43
End: 2018-04-13
Attending: INTERNAL MEDICINE
Payer: COMMERCIAL

## 2018-04-13 VITALS
HEIGHT: 63 IN | SYSTOLIC BLOOD PRESSURE: 129 MMHG | BODY MASS INDEX: 38.2 KG/M2 | DIASTOLIC BLOOD PRESSURE: 70 MMHG | RESPIRATION RATE: 18 BRPM | TEMPERATURE: 97.5 F | OXYGEN SATURATION: 96 % | HEART RATE: 81 BPM | WEIGHT: 215.6 LBS

## 2018-04-13 DIAGNOSIS — C43.62 MALIGNANT MELANOMA OF LEFT UPPER EXTREMITY INCLUDING SHOULDER (H): Primary | ICD-10-CM

## 2018-04-13 DIAGNOSIS — K52.9 COLITIS: ICD-10-CM

## 2018-04-13 DIAGNOSIS — R21 RASH AND NONSPECIFIC SKIN ERUPTION: ICD-10-CM

## 2018-04-13 DIAGNOSIS — J45.20 MILD INTERMITTENT ASTHMA WITHOUT COMPLICATION: ICD-10-CM

## 2018-04-13 LAB — CALPROTECTIN STL-MCNT: >3000 MG/KG (ref 0–49.9)

## 2018-04-13 PROCEDURE — 99214 OFFICE O/P EST MOD 30 MIN: CPT | Performed by: INTERNAL MEDICINE

## 2018-04-13 PROCEDURE — G0463 HOSPITAL OUTPT CLINIC VISIT: HCPCS

## 2018-04-13 ASSESSMENT — PAIN SCALES - GENERAL: PAINLEVEL: MILD PAIN (3)

## 2018-04-13 NOTE — NURSING NOTE
"Oncology Rooming Note    April 13, 2018 11:10 AM   Doretha Fernandez is a 43 year old female who presents for:    Chief Complaint   Patient presents with     Oncology Clinic Visit     One week follow up rash on face, malignant melanoma.      Initial Vitals: /70 (BP Location: Right arm, Patient Position: Sitting, Cuff Size: Adult Regular)  Pulse 81  Temp 97.5  F (36.4  C) (Tympanic)  Resp 18  Ht 1.6 m (5' 2.99\")  Wt 97.8 kg (215 lb 9.6 oz)  SpO2 96%  Breastfeeding? No  BMI 38.2 kg/m2 Estimated body mass index is 38.2 kg/(m^2) as calculated from the following:    Height as of this encounter: 1.6 m (5' 2.99\").    Weight as of this encounter: 97.8 kg (215 lb 9.6 oz). Body surface area is 2.08 meters squared.  Mild Pain (3) Comment: left side.    No LMP recorded.  Allergies reviewed: Yes  Medications reviewed: Yes    Medications: Medication refills not needed today.  Pharmacy name entered into [facility]: [facility] [place], [place] - [address] [facility]    Clinical concerns: One week follow up rash on face, malignant melanoma.     8 minutes for nursing intake (face to face time)     Dai Canales CMA            "

## 2018-04-13 NOTE — LETTER
4/13/2018         RE: Doretha Fernandez  [address]  [place] [address]        Dear Colleague,    Thank you for referring your patient, Doretha Fernandez, to the [facility]. Please see a copy of my visit note below.    Patient called to report that she is to start steroid enemas for 21 days and will be on high dose prednisone therapy.  She reports having 30+ episodes of diarrhea per day.  She is requesting a letter for her work to keep her out for another 30 days. OK for letter per Dr. Richards. Physical therapy will need to update work restrictions and provide these to her employer.  Pt is aware.    DATE OF VISIT: Apr 13, 2018    Doretha Fernandez is a 43 year old female is seen today for   Chief Complaint   Patient presents with     Oncology Clinic Visit     One week follow up rash on face, malignant melanoma.    .       (C43.62) Malignant melanoma of left upper extremity including shoulder (H)  (primary encounter diagnosis)  I reviewed with the patient today management plan.  Unfortunately immunotherapy will be held at this point.  We will continue to monitor the patient's symptoms.    (K52.9) Colitis  Patient was seen by gastroenterology.  Sigmoidoscopy was done.  She will be starting on steroid enemas and continue on prednisone.  The management plan was reviewed with gastroenterology.  I will see the patient again in 2 weeks or sooner if there are new developments or concerns.    (R21) Rash and nonspecific skin eruption  Patient rash has improved    The patient is ready to learn, no apparent learning barriers were identified.  Diagnosis and treatment plans were explained to the patient. The patient expressed understanding of the content. The patient asked appropriate questions. The patient questions were answered to her satisfaction.   I spent 25 minutes more than 50% of the time in counseling and coordination of care.  Including discussion of side effects of immune therapy, management of  autoimmune colitis and follow-up of melanoma  Chart documentation with Dragon Voice recognition Software. Although reviewed after completion, some words and grammatical errors may remain.    Again, thank you for allowing me to participate in the care of your patient.        Sincerely,        Kathy Richards MD

## 2018-04-13 NOTE — LETTER
[facility]  [facility]  [address]  [facility] [address]  Phone: [phone]        April 11, 2018    RE:  Doretha Fernandez                                                                                                                                                       [address] [address]          To whom it may concern:    Doretha Fernandez is under our  care for Metastatic Malignant Melanoma and Colitis. She was in the hospital due to complications from March 3, 2018-March 10, 2018. She continues to experience adverse effects and is UNABLE TO WORK at this time. She  UNABLE to return to work until May 21, 2018.      When the patient returns to work, the restrictions as listed on the workability report will be in effect.    Professionaly,      Dr. Kathy Richards  [facility] [place]  (P)  [phone]  (F)  [phone]

## 2018-04-13 NOTE — PATIENT INSTRUCTIONS
We would like to see you back in clinic with Dr. Richards in 2 weeks with labs prior. Opdivo will remain on hold indefinitely.     When you are in need of a refill, please call your pharmacy and they will send us a request.      Copy of appointments, and after visit summary (AVS) given to patient.      If you have any questions during business hours (M-F 8 AM- 4PM), please call Jeanie Keenan RN, BSN, OCN Oncology Hematology /Breast Cancer Navigator at [facility] [phone].       For questions after business hours, or on holidays/weekends, please call our after hours Nurse Triage line [phone]. Thank you.

## 2018-04-13 NOTE — MR AVS SNAPSHOT
After Visit Summary   4/13/2018    Doretha Fernandez    MRN: [id number]           Patient Information     Date Of Birth          1975        Visit Information        Provider Department      4/13/2018 10:45 AM Kathy Richards MD [facility] ONCOLOGY      Today's Diagnoses     Malignant melanoma of left upper extremity including shoulder (H)    -  1    Colitis        Rash and nonspecific skin eruption        Mild intermittent asthma without complication          Care Instructions    We would like to see you back in clinic with Dr. Richards in 2 weeks with labs prior. Opdivo will remain on hold indefinitely.     When you are in need of a refill, please call your pharmacy and they will send us a request.      Copy of appointments, and after visit summary (AVS) given to patient.      If you have any questions during business hours (M-F 8 AM- 4PM), please call Jeanie Keenan RN, BSN, OCN Oncology Hematology /Breast Cancer Navigator at [facility] [phone].       For questions after business hours, or on holidays/weekends, please call our after hours Nurse Triage line [phone]. Thank you.            Follow-ups after your visit        Follow-up notes from your care team     Return in about 2 weeks (around 4/27/2018).      Your next 10 appointments already scheduled     Apr 17, 2018  2:45 PM CDT   Treatment 60 with Anna Graham, PT   [facility] Lymphedema ([facility])    [address] [facility] [address]   [phone]            Apr 19, 2018  1:30 PM CDT   Treatment 60 with Anna Graham PT   [facility]ymphedema ([facility])    [address] [facility] [address]   [phone]            Apr 24, 2018  2:30 PM CDT   Treatment 60 with Anna Graham PT   [facility]hedema ([facility])    [address] [facility] [address]   [phone]             Apr 26, 2018  2:30 PM CDT   Treatment 60 with Anna Graham, PT   [facility] ([facility])    [address] [facility] [address]   [phone]            Apr 27, 2018 10:30 AM CDT   LAB with [facility] ([facility])    [address] [facility] [address]   [phone]           Please do not eat 10-12 hours before your appointment if you are coming in fasting for labs on lipids, cholesterol, or glucose (sugar). This does not apply to pregnant women. Water, hot tea and black coffee (with nothing added) are okay. Do not drink other fluids, diet soda or chew gum.            Apr 27, 2018 11:45 AM CDT   Return Visit with Kathy Richards MD   [facility] ([facility])    [facility]  [address]  [facility] [address]   [phone]            May 01, 2018  2:30 PM CDT   Treatment 60 with Anna Graham, PT   [facility]ymphedema ([facility])    [address] [facility] [address]   [phone]            May 03, 2018  1:00 PM CDT   Treatment 60 with Anastasiya Rodriguez PTA   [facility] Lymphedema ([facility])    [address] [facility] [address]   [phone]            May 21, 2018 12:20 PM CDT   (Arrive by 12:05 PM)   Return Visit with Zeferino Sanchez PA-C   [facility] ([facility])    [address] [place] [address]  [address] [facility] [id number] [phone]            Jun 18, 2018  1:40 PM CDT   (Arrive by 1:25 PM)   INFLAMMATORY BOWEL DISEASE with Eddie Boudreaux MD   [facility] ([facility])    [address] [place] [id number] [phone]              Future tests that were ordered for you today     Open Future Orders        Priority Expected Expires  "Ordered    CBC with platelets differential Routine 4/27/2018 4/13/2019 4/13/2018    Comprehensive metabolic panel Routine 4/27/2018 4/13/2019 4/13/2018            Who to contact     If you have questions or need follow up information about today's clinic visit or your schedule please contact [facility] directly at [phone].  Normal or non-critical lab and imaging results will be communicated to you by MyChart, letter or phone within 4 business days after the clinic has received the results. If you do not hear from us within 7 days, please contact the clinic through [employer] or phone. If you have a critical or abnormal lab result, we will notify you by phone as soon as possible.  Submit refill requests through [employer] or call your pharmacy and they will forward the refill request to us. Please allow 3 business days for your refill to be completed.          Additional Information About Your Visit        [employer] Information     [employer] gives you secure access to your electronic health record. If you see a primary care provider, you can also send messages to your care team and make appointments. If you have questions, please call your primary care clinic.  If you do not have a primary care provider, please call [phone] and they will assist you.        Care EveryWhere ID     This is your Care EveryWhere ID. This could be used by other organizations to access your [facility] medical records  [id number]        Your Vitals Were     Pulse Temperature Respirations Height Pulse Oximetry Breastfeeding?    81 97.5  F (36.4  C) (Tympanic) 18 1.6 m (5' 2.99\") 96% No    BMI (Body Mass Index)                   38.2 kg/m2            Blood Pressure from Last 3 Encounters:   04/13/18 129/70   04/10/18 117/80   04/09/18 130/69    Weight from Last 3 Encounters:   04/13/18 97.8 kg (215 lb 9.6 oz)   04/10/18 97 kg (213 lb 14.4 oz)   04/05/18 95.8 kg (211 lb 1.6 oz)               Primary Care Provider Office Phone # Fax # "    Anna Naidu [phone]       [address] [address] [facility] [address]        Equal Access to Services     BRIGETTE ROBINA : Nicol josy garnett richard Gustafson, cjda rosmeryjoni, marisolta kamosheda chris, pancho castillo. So [facility] [phone].    ATENCIÓN: Si habla español, tiene a mcdonnell disposición servicios gratuitos de asistencia lingüística. Llame al [phone].    We comply with applicable federal civil rights laws and [place] laws. We do not discriminate on the basis of race, color, national origin, age, disability, sex, sexual orientation, or gender identity.            Thank you!     Thank you for choosing [facility]  for your care. Our goal is always to provide you with excellent care. Hearing back from our patients is one way we can continue to improve our services. Please take a few minutes to complete the written survey that you may receive in the mail after your visit with us. Thank you!             Your Updated Medication List - Protect others around you: Learn how to safely use, store and throw away your medicines at www.disposemymeds.org.          This list is accurate as of 4/13/18 11:54 AM.  Always use your most recent med list.                   Brand Name Dispense Instructions for use Diagnosis    hydrocortisone 100 MG/60ML enema    CORTENEMA    24 enema    1 enema every night for 21 days; then 1 enema every other night for 1 week and then stop    Colitis       HYDROmorphone 4 MG tablet    DILAUDID    170 tablet    Take 1-1.5 tablets (4-6 mg) by mouth every 3 hours as needed for moderate to severe pain    Proctocolitis       LORazepam 0.5 MG tablet    ATIVAN    40 tablet    Take 1-2 tablets (0.5-1 mg) by mouth every 6 hours as needed for anxiety or other (nausea)    Proctocolitis, Anxiety state, Metastatic malignant melanoma (H)       ondansetron 8 MG ODT tab    ZOFRAN-ODT    60 tablet    Take 1 tablet (8 mg) by mouth every 8 hours as needed for  "nausea or vomiting    Proctocolitis, Metastatic malignant melanoma (H)       * order for DME     1 Units    Equipment being ordered: 20-30mmHg compression sleeve and 20-30mmHg compression glove\" x2 pair    Lymphedema of left arm       * order for DME     1 each    Equipment being ordered: x2 Wearease compression shirt    Secondary lymphedema       * predniSONE 10 MG tablet    DELTASONE    100 tablet    Take 2 tablets (20 mg) by mouth daily    Malignant melanoma of left upper extremity including shoulder (H), Colitis       * predniSONE 5 MG tablet    DELTASONE    228 tablet    Take 8 tabs daily (40 mg) for 2 weeks, then decrease by 2 tabs daily (10 mg) every week until at 4 tabs daily (20 mg), then decrease by 1 tab daily (5 mg) every week until 1 tablet daily (5 mg) then 1 tablet (5mg) every other day for 1 week    Colitis       rOPINIRole 0.25 MG tablet    REQUIP    30 tablet    Take 1 tablet (0.25 mg) by mouth At Bedtime    Restless leg syndrome       TYLENOL PO      Take 1,000 mg by mouth every 8 hours as needed for mild pain or fever        venlafaxine 150 MG Tb24 24 hr tablet    EFFEXOR-ER    90 each    Take 1 tablet (150 mg) by mouth daily (with breakfast)    Mild major depression (H)       Vitamin D3 3000 units Tabs     30 tablet    Take 3,000 Int'l Units by mouth daily    Vitamin D deficiency       * Notice:  This list has 4 medication(s) that are the same as other medications prescribed for you. Read the directions carefully, and ask your doctor or other care provider to review them with you.      "

## 2018-04-16 ENCOUNTER — TELEPHONE (OUTPATIENT)
Dept: GASTROENTEROLOGY | Facility: CLINIC | Age: 43
End: 2018-04-16

## 2018-04-16 ENCOUNTER — CARE COORDINATION (OUTPATIENT)
Dept: GASTROENTEROLOGY | Facility: CLINIC | Age: 43
End: 2018-04-16

## 2018-04-16 DIAGNOSIS — K64.4 EXTERNAL HEMORRHOIDS: Primary | ICD-10-CM

## 2018-04-16 LAB — COPATH REPORT: NORMAL

## 2018-04-16 NOTE — TELEPHONE ENCOUNTER
Doretha is calling for Dr. Davey.  She reports last week was started on prednisone enemas.  Has done 5 since Friday.  States she's been constipated since Thursday.  Getting blood an pus results from enemas no stool.  Having acid reflux, nausea and abdominal pain.    Will get information to Dr. Petar Luevano's clinic nurse and request call back.

## 2018-04-16 NOTE — PROGRESS NOTES
DATE OF VISIT: Apr 13, 2018    Doretha Fernandez is a 43 year old female is seen today for   Chief Complaint   Patient presents with     Oncology Clinic Visit     One week follow up rash on face, malignant melanoma.    .       (C43.62) Malignant melanoma of left upper extremity including shoulder (H)  (primary encounter diagnosis)  I reviewed with the patient today management plan.  Unfortunately immunotherapy will be held at this point.  We will continue to monitor the patient's symptoms.    (K52.9) Colitis  Patient was seen by gastroenterology.  Sigmoidoscopy was done.  She will be starting on steroid enemas and continue on prednisone.  The management plan was reviewed with gastroenterology.  I will see the patient again in 2 weeks or sooner if there are new developments or concerns.    (R21) Rash and nonspecific skin eruption  Patient rash has improved    The patient is ready to learn, no apparent learning barriers were identified.  Diagnosis and treatment plans were explained to the patient. The patient expressed understanding of the content. The patient asked appropriate questions. The patient questions were answered to her satisfaction.   I spent 25 minutes more than 50% of the time in counseling and coordination of care.  Including discussion of side effects of immune therapy, management of autoimmune colitis and follow-up of melanoma  Chart documentation with Dragon Voice recognition Software. Although reviewed after completion, some words and grammatical errors may remain.

## 2018-04-16 NOTE — PROGRESS NOTES
Pt stating that she has little to no stool and urge to have bowel movement.  Only passing mucus and small amounts of blood. Discussed with Dr. Davey. To start cortifoam, start miralax, sitz baths, prep h and or Calmoseptine. Stop the enemas.  Do not sit for long periods on the toilet and do not strain.  Update with symptoms.

## 2018-04-17 ENCOUNTER — CARE COORDINATION (OUTPATIENT)
Dept: GASTROENTEROLOGY | Facility: CLINIC | Age: 43
End: 2018-04-17

## 2018-04-17 NOTE — PROGRESS NOTES
[facility] pharmacy calling to inform us that proctofoam not covered. Will check with Dr. Davey to change to cortifoam that would need a prior but at least on formulary.

## 2018-04-18 ENCOUNTER — DOCUMENTATION ONLY (OUTPATIENT)
Dept: ONCOLOGY | Facility: CLINIC | Age: 43
End: 2018-04-18

## 2018-04-18 ENCOUNTER — CARE COORDINATION (OUTPATIENT)
Dept: GASTROENTEROLOGY | Facility: CLINIC | Age: 43
End: 2018-04-18

## 2018-04-18 ENCOUNTER — TELEPHONE (OUTPATIENT)
Dept: GASTROENTEROLOGY | Facility: CLINIC | Age: 43
End: 2018-04-18

## 2018-04-18 DIAGNOSIS — K52.9 COLITIS: Primary | ICD-10-CM

## 2018-04-18 NOTE — PROGRESS NOTES
Patient requested intermittent [place] paperwork be completed for her employment with [employer].    Forms completed, reviewed and signed by Dr. Richards.  Faxed to number provided.  Originals in envelope at  for patient  and copy sent to scanning.    Pt aware and will  originals tomorrow.

## 2018-04-18 NOTE — PROGRESS NOTES
Prior Authorization Specialty Medication Request    Medication/Dose: cortifoam   ICD code (if different than what is on RX):  no  Previously Tried and Failed:    Not on formulary proctofoam    failed cortenema     iImportant Lab Values:   Rationale:   Elizabeth Moreira is a very pleasant 43-year-old female with a history of metastatic melanoma (with unknown primary) who was treated with the check-point inhibitor Opdivo.  She developed colitis that is likely due to this drug.  This is a difficult situation as she has developed significant colitis that is likely due to one of her best treatment options for the metastatic melanoma.  Her symptoms have not resolved by holding the medication and even after significant steroids they have not resolved completely.  First we will ensure     Insurance Name:   [id number] ELIZABETH MOREIRA     Rel to sub: 01 - Self     Member ID: [id number]     Payor: [facility] Ph: [phone]     Benefit plan: [facility] OPEN ACCESS Ph: [phone]     Group number: [id number]        Insurance ID:   Insurance Phone Number:     Pharmacy Information (if different than what is on RX)  Name:    Phone:

## 2018-04-19 ENCOUNTER — HOSPITAL ENCOUNTER (OUTPATIENT)
Dept: PHYSICAL THERAPY | Facility: CLINIC | Age: 43
Setting detail: THERAPIES SERIES
End: 2018-04-19
Attending: INTERNAL MEDICINE
Payer: COMMERCIAL

## 2018-04-19 ENCOUNTER — TELEPHONE (OUTPATIENT)
Dept: FAMILY MEDICINE | Facility: CLINIC | Age: 43
End: 2018-04-19

## 2018-04-19 DIAGNOSIS — F41.1 ANXIETY STATE: ICD-10-CM

## 2018-04-19 DIAGNOSIS — K52.9 PROCTOCOLITIS: ICD-10-CM

## 2018-04-19 DIAGNOSIS — E55.9 VITAMIN D DEFICIENCY: ICD-10-CM

## 2018-04-19 DIAGNOSIS — C43.9 METASTATIC MALIGNANT MELANOMA (H): ICD-10-CM

## 2018-04-19 PROCEDURE — 97140 MANUAL THERAPY 1/> REGIONS: CPT | Mod: GP | Performed by: REHABILITATION PRACTITIONER

## 2018-04-19 PROCEDURE — 40000099 ZZH STATISTIC LYMPHEDEMA VISIT: Performed by: REHABILITATION PRACTITIONER

## 2018-04-19 RX ORDER — GLUCOSAMINE HCL 500 MG
3000 TABLET ORAL DAILY
Qty: 30 TABLET | Refills: 3 | Status: CANCELLED | OUTPATIENT
Start: 2018-04-19

## 2018-04-19 RX ORDER — LORAZEPAM 0.5 MG/1
.5-1 TABLET ORAL EVERY 6 HOURS PRN
Qty: 40 TABLET | Refills: 0 | Status: SHIPPED | OUTPATIENT
Start: 2018-04-19 | End: 2018-07-11

## 2018-04-19 RX ORDER — GLUCOSAMINE HCL 500 MG
3000 TABLET ORAL DAILY
Qty: 30 TABLET | Refills: 3 | Status: SHIPPED | OUTPATIENT
Start: 2018-04-19 | End: 2018-09-07

## 2018-04-19 RX ORDER — CHOLECALCIFEROL (VITAMIN D3) 25 MCG
TABLET ORAL
Qty: 90 TABLET | Refills: 1 | OUTPATIENT
Start: 2018-04-19

## 2018-04-19 RX ORDER — LORAZEPAM 0.5 MG/1
.5-1 TABLET ORAL EVERY 6 HOURS PRN
Qty: 40 TABLET | Refills: 0 | Status: CANCELLED | OUTPATIENT
Start: 2018-04-19

## 2018-04-19 NOTE — TELEPHONE ENCOUNTER
"Requested Prescriptions   Pending Prescriptions Disp Refills     Cholecalciferol (VITAMIN D3) 3000 units TABS  Last Written Prescription Date:  10/05/2017  Last Fill Quantity: 30,  # refills: 3   Last office visit: 11/22/2017 with prescribing provider:  CALVIN Harris   Future Office Visit:   Next 5 appointments (look out 90 days)     Apr 27, 2018 11:45 AM CDT   Return Visit with Kathy Richards MD   [facility] ([facility])    [facility]  [address] [facility] [address]  [facility] [address]   [phone]                  30 tablet 3     Sig: Take 3,000 Int'l Units by mouth daily    Vitamin Supplements (Adult) Protocol Passed    4/19/2018 12:25 PM       Passed - High dose Vitamin D not ordered       Passed - Recent (12 mo) or future (30 days) visit within the authorizing provider's specialty    Patient had office visit in the last 12 months or has a visit in the next 30 days with authorizing provider or within the authorizing provider's specialty.  See \"Patient Info\" tab in inbasket, or \"Choose Columns\" in Meds & Orders section of the refill encounter.            Steve Borges RT (R)    "

## 2018-04-19 NOTE — TELEPHONE ENCOUNTER
Reason for Call:  Medication or medication refill:    Do you use a [facility]?  Name of the pharmacy and phone number for the current request:  [facility] [phone]    Name of the medication requested: Ativan & D3 - Pt states that she was told to call her Dr. Naidu for refills on both of these meds.   D3 & Ativan      Last Written Prescription Date:  10/5/17 & 3/29/18  Last Fill Quantity: 30 & 40,   # refills: 3 & 0  Last Office Visit: 10/20/17  Future Office visit:    Next 5 appointments (look out 90 days)     Apr 27, 2018 11:45 AM CDT   Return Visit with Kathy Richards MD   [facility] ([facility])    [facility]  [address] [id number] [phone]                 Routing refill request to provider for review/approval because:  Drug not on the [facility], UMP or  Health refill protocol or controlled substance    Other request:     Can we leave a detailed message on this number? YES    Phone number patient can be reached at: Cell number on file:    Telephone Information:   Mobile [phone]       Best Time: any    Call taken on 4/19/2018 at 9:56 AM by Jena Quintero

## 2018-04-19 NOTE — TELEPHONE ENCOUNTER
Routing refill request to provider for review/approval because:  Last signed by another provider.    Trang Alatorre RN

## 2018-04-20 NOTE — TELEPHONE ENCOUNTER
Patient advised that the D3 was done yesterday and Dr. Richards's office approved the Ativan yesterday as well.    Trang Alatorre RN

## 2018-04-21 ENCOUNTER — TELEPHONE (OUTPATIENT)
Dept: FAMILY MEDICINE | Facility: CLINIC | Age: 43
End: 2018-04-21

## 2018-04-21 NOTE — TELEPHONE ENCOUNTER
Insurance requires a prior auth for cortifoam    medimpact  ID# [id number]  [phone]    Thanks!    Joanna Jaimes, [facility]  [phone]

## 2018-04-23 ENCOUNTER — CARE COORDINATION (OUTPATIENT)
Dept: GASTROENTEROLOGY | Facility: CLINIC | Age: 43
End: 2018-04-23

## 2018-04-23 DIAGNOSIS — K52.9 COLITIS: Primary | ICD-10-CM

## 2018-04-23 RX ORDER — MESALAMINE 1.2 G/1
4800 TABLET, DELAYED RELEASE ORAL EVERY MORNING
Qty: 120 TABLET | Refills: 3 | Status: SHIPPED | OUTPATIENT
Start: 2018-04-23 | End: 2018-06-12

## 2018-04-23 RX ORDER — HYDROCORTISONE ACETATE 25 MG/1
25 SUPPOSITORY RECTAL 2 TIMES DAILY
Qty: 56 SUPPOSITORY | Refills: 3 | Status: ON HOLD | OUTPATIENT
Start: 2018-04-23 | End: 2018-05-09

## 2018-04-23 NOTE — TELEPHONE ENCOUNTER
Central Prior Authorization Team   Phone: [phone]      PA Initiation by manual fax    Medication: hydrocortisone (CORTIFOAM) 10 % rectal foam-PA Initiated  Insurance Company: [employer] - Phone [phone] Fax [phone]  Pharmacy Filling the Rx: [facility] [place], [place] - [address] [facility]  Filling Pharmacy Phone: [phone]  Filling Pharmacy Fax: [phone]  Start Date: 4/23/2018    Faxed to [employer] at [phone]. Call [phone] for follow up.

## 2018-04-23 NOTE — PROGRESS NOTES
Pt called in this am.  Pt having 7 to 9 slightly formed stools will blood noted in stools. Up 3 to 4 times at night to have stools. Discussed symptoms with Dr. Davey.  Dr. Davey has discussed Remicade treatment with Dr. Hernandez and he is would like to give  the rectal steroid mnore time and also concerned that Remicade would be counter the treatment for melanoma.  Called pt back with this message and added medications.     Ordered Lialda and hydrocortisone supp.  Pt has appt with  on Tuesday.  Pt will update.

## 2018-04-24 ENCOUNTER — HOSPITAL ENCOUNTER (OUTPATIENT)
Dept: PHYSICAL THERAPY | Facility: CLINIC | Age: 43
Setting detail: THERAPIES SERIES
End: 2018-04-24
Attending: INTERNAL MEDICINE
Payer: COMMERCIAL

## 2018-04-24 ENCOUNTER — CARE COORDINATION (OUTPATIENT)
Dept: GASTROENTEROLOGY | Facility: CLINIC | Age: 43
End: 2018-04-24

## 2018-04-24 PROCEDURE — 97140 MANUAL THERAPY 1/> REGIONS: CPT | Mod: GP | Performed by: REHABILITATION PRACTITIONER

## 2018-04-24 PROCEDURE — 40000099 ZZH STATISTIC LYMPHEDEMA VISIT: Performed by: REHABILITATION PRACTITIONER

## 2018-04-24 NOTE — PROGRESS NOTES
Pt called to say she just received the Lialda and the hydrocortisone suppository as her pharmacy had to ordered. Told to continue her Prednisone, take Lialda daily and suppositories two times a day.

## 2018-04-24 NOTE — ADDENDUM NOTE
Encounter addended by: Anastasiya Rodriguez PTA on: 4/24/2018  2:17 PM<BR>     Actions taken: Flowsheet accepted

## 2018-04-25 NOTE — TELEPHONE ENCOUNTER
Prior Authorization Approval    Authorization Effective Date: 3/23/2018  Authorization Expiration Date: 4/23/2019  Medication: hydrocortisone (CORTIFOAM) 10 % rectal foam-APPROVED  Approved Dose/Quantity:  Reference #: [id number]   Insurance Company: [employer] - Phone [phone] Fax [phone]  Expected CoPay:       CoPay Card Available:      Foundation Assistance Needed:    Which Pharmacy is filling the prescription (Not needed for infusion/clinic administered): [facility] [place], [place] - [address] [place] [address]  Pharmacy Notified: Yes  Patient Notified: No    Prior auth approved however, per pharmacy, it is no longer manufactured. New script was sent to the pharmacy and filled.

## 2018-04-26 ENCOUNTER — HOSPITAL ENCOUNTER (OUTPATIENT)
Dept: PHYSICAL THERAPY | Facility: CLINIC | Age: 43
Setting detail: THERAPIES SERIES
End: 2018-04-26
Attending: INTERNAL MEDICINE
Payer: COMMERCIAL

## 2018-04-26 PROCEDURE — 97140 MANUAL THERAPY 1/> REGIONS: CPT | Mod: GP | Performed by: REHABILITATION PRACTITIONER

## 2018-04-26 PROCEDURE — 40000099 ZZH STATISTIC LYMPHEDEMA VISIT: Performed by: REHABILITATION PRACTITIONER

## 2018-04-27 ENCOUNTER — TRANSFERRED RECORDS (OUTPATIENT)
Dept: HEALTH INFORMATION MANAGEMENT | Facility: CLINIC | Age: 43
End: 2018-04-27

## 2018-04-28 ENCOUNTER — TRANSFERRED RECORDS (OUTPATIENT)
Dept: HEALTH INFORMATION MANAGEMENT | Facility: CLINIC | Age: 43
End: 2018-04-28

## 2018-05-01 ENCOUNTER — TRANSFERRED RECORDS (OUTPATIENT)
Dept: HEALTH INFORMATION MANAGEMENT | Facility: CLINIC | Age: 43
End: 2018-05-01

## 2018-05-05 ENCOUNTER — APPOINTMENT (OUTPATIENT)
Dept: CT IMAGING | Facility: CLINIC | Age: 43
DRG: 871 | End: 2018-05-05
Attending: EMERGENCY MEDICINE
Payer: COMMERCIAL

## 2018-05-05 ENCOUNTER — HOSPITAL ENCOUNTER (INPATIENT)
Facility: CLINIC | Age: 43
LOS: 3 days | Discharge: HOME | DRG: 871 | End: 2018-05-09
Attending: EMERGENCY MEDICINE | Admitting: FAMILY MEDICINE
Payer: COMMERCIAL

## 2018-05-05 DIAGNOSIS — J11.1 INFLUENZA-LIKE ILLNESS: ICD-10-CM

## 2018-05-05 DIAGNOSIS — D84.9 IMMUNOSUPPRESSED STATUS (H): ICD-10-CM

## 2018-05-05 DIAGNOSIS — K52.9 PROCTOCOLITIS: ICD-10-CM

## 2018-05-05 DIAGNOSIS — J18.9 PNEUMONIA DUE TO INFECTIOUS ORGANISM, UNSPECIFIED LATERALITY, UNSPECIFIED PART OF LUNG: Primary | ICD-10-CM

## 2018-05-05 DIAGNOSIS — K59.00 CONSTIPATION, UNSPECIFIED CONSTIPATION TYPE: ICD-10-CM

## 2018-05-05 LAB
ALBUMIN SERPL-MCNC: 3.2 G/DL (ref 3.4–5)
ALBUMIN UR-MCNC: NEGATIVE MG/DL
ALP SERPL-CCNC: 41 U/L (ref 40–150)
ALT SERPL W P-5'-P-CCNC: 86 U/L (ref 0–50)
ANION GAP SERPL CALCULATED.3IONS-SCNC: 9 MMOL/L (ref 3–14)
APPEARANCE UR: CLEAR
AST SERPL W P-5'-P-CCNC: 38 U/L (ref 0–45)
BASOPHILS # BLD AUTO: 0 10E9/L (ref 0–0.2)
BASOPHILS NFR BLD AUTO: 0 %
BILIRUB SERPL-MCNC: 0.3 MG/DL (ref 0.2–1.3)
BILIRUB UR QL STRIP: NEGATIVE
BUN SERPL-MCNC: 18 MG/DL (ref 7–30)
CALCIUM SERPL-MCNC: 8.1 MG/DL (ref 8.5–10.1)
CHLORIDE SERPL-SCNC: 101 MMOL/L (ref 94–109)
CO2 SERPL-SCNC: 26 MMOL/L (ref 20–32)
COLOR UR AUTO: NORMAL
CREAT SERPL-MCNC: 0.73 MG/DL (ref 0.52–1.04)
DIFFERENTIAL METHOD BLD: ABNORMAL
EOSINOPHIL # BLD AUTO: 0 10E9/L (ref 0–0.7)
EOSINOPHIL NFR BLD AUTO: 0 %
ERYTHROCYTE [DISTWIDTH] IN BLOOD BY AUTOMATED COUNT: 14.3 % (ref 10–15)
FLUAV+FLUBV AG SPEC QL: NEGATIVE
FLUAV+FLUBV AG SPEC QL: NEGATIVE
GFR SERPL CREATININE-BSD FRML MDRD: 87 ML/MIN/1.7M2
GLUCOSE SERPL-MCNC: 108 MG/DL (ref 70–99)
GLUCOSE UR STRIP-MCNC: NEGATIVE MG/DL
HCT VFR BLD AUTO: 35.9 % (ref 35–47)
HGB BLD-MCNC: 11.8 G/DL (ref 11.7–15.7)
HGB UR QL STRIP: NEGATIVE
HYPOCHROMIA BLD QL: PRESENT
KETONES UR STRIP-MCNC: NEGATIVE MG/DL
LACTATE BLD-SCNC: 2.4 MMOL/L (ref 0.7–2)
LEUKOCYTE ESTERASE UR QL STRIP: NEGATIVE
LYMPHOCYTES # BLD AUTO: 1.1 10E9/L (ref 0.8–5.3)
LYMPHOCYTES NFR BLD AUTO: 9.5 %
MCH RBC QN AUTO: 28.6 PG (ref 26.5–33)
MCHC RBC AUTO-ENTMCNC: 32.9 G/DL (ref 31.5–36.5)
MCV RBC AUTO: 87 FL (ref 78–100)
MONOCYTES # BLD AUTO: 0 10E9/L (ref 0–1.3)
MONOCYTES NFR BLD AUTO: 0 %
MYELOCYTES # BLD: 0.1 10E9/L
MYELOCYTES NFR BLD MANUAL: 1 %
NEUTROPHILS # BLD AUTO: 10.7 10E9/L (ref 1.6–8.3)
NEUTROPHILS NFR BLD AUTO: 89.5 %
NITRATE UR QL: NEGATIVE
PH UR STRIP: 6 PH (ref 5–7)
PLATELET # BLD AUTO: 201 10E9/L (ref 150–450)
POTASSIUM SERPL-SCNC: 3.6 MMOL/L (ref 3.4–5.3)
PROT SERPL-MCNC: 6 G/DL (ref 6.8–8.8)
RBC # BLD AUTO: 4.13 10E12/L (ref 3.8–5.2)
SODIUM SERPL-SCNC: 136 MMOL/L (ref 133–144)
SOURCE: NORMAL
SP GR UR STRIP: 1.03 (ref 1–1.03)
SPECIMEN SOURCE: NORMAL
UROBILINOGEN UR STRIP-MCNC: 0 MG/DL (ref 0–2)
WBC # BLD AUTO: 12 10E9/L (ref 4–11)

## 2018-05-05 PROCEDURE — 74177 CT ABD & PELVIS W/CONTRAST: CPT

## 2018-05-05 PROCEDURE — 81003 URINALYSIS AUTO W/O SCOPE: CPT | Performed by: EMERGENCY MEDICINE

## 2018-05-05 PROCEDURE — 25000125 ZZHC RX 250: Performed by: EMERGENCY MEDICINE

## 2018-05-05 PROCEDURE — 96375 TX/PRO/DX INJ NEW DRUG ADDON: CPT | Performed by: EMERGENCY MEDICINE

## 2018-05-05 PROCEDURE — 87040 BLOOD CULTURE FOR BACTERIA: CPT | Performed by: EMERGENCY MEDICINE

## 2018-05-05 PROCEDURE — 87804 INFLUENZA ASSAY W/OPTIC: CPT | Performed by: EMERGENCY MEDICINE

## 2018-05-05 PROCEDURE — 25000128 H RX IP 250 OP 636: Performed by: EMERGENCY MEDICINE

## 2018-05-05 PROCEDURE — 99285 EMERGENCY DEPT VISIT HI MDM: CPT | Mod: 25 | Performed by: EMERGENCY MEDICINE

## 2018-05-05 PROCEDURE — 80053 COMPREHEN METABOLIC PANEL: CPT | Performed by: EMERGENCY MEDICINE

## 2018-05-05 PROCEDURE — 83605 ASSAY OF LACTIC ACID: CPT | Performed by: EMERGENCY MEDICINE

## 2018-05-05 PROCEDURE — 85025 COMPLETE CBC W/AUTO DIFF WBC: CPT | Performed by: EMERGENCY MEDICINE

## 2018-05-05 PROCEDURE — 96374 THER/PROPH/DIAG INJ IV PUSH: CPT | Performed by: EMERGENCY MEDICINE

## 2018-05-05 PROCEDURE — 96361 HYDRATE IV INFUSION ADD-ON: CPT | Performed by: EMERGENCY MEDICINE

## 2018-05-05 RX ORDER — KETOROLAC TROMETHAMINE 30 MG/ML
15 INJECTION, SOLUTION INTRAMUSCULAR; INTRAVENOUS ONCE
Status: COMPLETED | OUTPATIENT
Start: 2018-05-05 | End: 2018-05-05

## 2018-05-05 RX ORDER — IOPAMIDOL 755 MG/ML
88 INJECTION, SOLUTION INTRAVASCULAR ONCE
Status: COMPLETED | OUTPATIENT
Start: 2018-05-05 | End: 2018-05-05

## 2018-05-05 RX ORDER — MORPHINE SULFATE 2 MG/ML
2-4 INJECTION, SOLUTION INTRAMUSCULAR; INTRAVENOUS EVERY 30 MIN PRN
Status: DISCONTINUED | OUTPATIENT
Start: 2018-05-05 | End: 2018-05-09 | Stop reason: HOSPADM

## 2018-05-05 RX ADMIN — SODIUM CHLORIDE, POTASSIUM CHLORIDE, SODIUM LACTATE AND CALCIUM CHLORIDE 1000 ML: 600; 310; 30; 20 INJECTION, SOLUTION INTRAVENOUS at 22:04

## 2018-05-05 RX ADMIN — SODIUM CHLORIDE 62 ML: 9 INJECTION, SOLUTION INTRAVENOUS at 22:47

## 2018-05-05 RX ADMIN — KETOROLAC TROMETHAMINE 15 MG: 30 INJECTION, SOLUTION INTRAMUSCULAR at 22:15

## 2018-05-05 RX ADMIN — IOPAMIDOL 88 ML: 755 INJECTION, SOLUTION INTRAVENOUS at 22:48

## 2018-05-05 ASSESSMENT — ENCOUNTER SYMPTOMS
NUMBNESS: 0
WEAKNESS: 0
BLOOD IN STOOL: 0
VOMITING: 0
ABDOMINAL PAIN: 1
APPETITE CHANGE: 1
DIARRHEA: 0
LIGHT-HEADEDNESS: 0
FATIGUE: 1
COUGH: 0
HEADACHES: 0
CHILLS: 1
NAUSEA: 1
BACK PAIN: 0
CONSTIPATION: 1
FEVER: 1
SHORTNESS OF BREATH: 0
DYSURIA: 0
MYALGIAS: 1
CHEST TIGHTNESS: 0

## 2018-05-05 NOTE — LETTER
Transition Communication Hand-off for Care Transitions to Next Level of Care Provider    Name: Doretha Fernandez  : 1975  MRN #: [id number]  Primary Care Provider: Anna Naidu  Primary Care MD Name: Dr. Naidu  Primary Clinic: [address] [facility] [address]  Primary Care Clinic Name: [facility]  Reason for Hospitalization:  Immunosuppressed status (H) [D89.9]  Influenza-like illness [R69]  Constipation, unspecified constipation type [K59.00]  Admit Date/Time: 2018  8:08 PM  Discharge Date: 2018  Payor Source: Payor: [employer] / Plan: [employer] OPEN ACCESS / Product Type: HMO /     Readmission Assessment Measure (SAMIA) Risk Score/category: Elevated  Reason for Communication Hand-off Referral: Avoidable readmission within 30 days  Discharge Plan: Home   Concern for non-adherence with plan of care:   Y/N no    Already enrolled in Tele-monitoring program and name of program:  no  Follow-up specialty is recommended: No    Follow-up plan:  Future Appointments  Date Time Provider Department Center   2018 10:40 AM Anna Naidu MD [facility]   2018 12:20 PM Zeferino Sanchez PA-C [facility]     Key Recommendations:  The patient lives with her (15 yo and 10 yo) children.  Her Father will be staying with her upon discharge if she needs him to.  Patient receives care for colitis at [facility].  Currently she receives treatment for metastatic melanoma at [facility], Dr. Richards.  Per patient she will be finishing her cancer treatment at [facility].  Agreed to Clinical Care coordination.    Shae Raza RN, BSN, PHN   RN Care Coordinator  [facility] [phone]  [facility] [phone]    AVS/Discharge Summary is the source of truth; this is a helpful guide for improved communication of patient story

## 2018-05-05 NOTE — IP AVS SNAPSHOT
MRN:[id number]                      After Visit Summary   5/5/2018    Doretha Fernandez    MRN: [id number]           Thank you!     Thank you for choosing [facility] for your care. Our goal is always to provide you with excellent care. Hearing back from our patients is one way we can continue to improve our services. Please take a few minutes to complete the written survey that you may receive in the mail after you visit with us. Thank you!        Patient Information     Date Of Birth          1975        Designated Caregiver       Most Recent Value    Caregiver    Will someone help with your care after discharge? yes    Name of designated caregiver duran    Phone number of caregiver [phone]    Caregiver address [place]      About your hospital stay     You were admitted on:  May 6, 2018 You last received care in the:  [facility]    You were discharged on:  May 9, 2018       Who to Call     For medical emergencies, please call 911.  For non-urgent questions about your medical care, please call your primary care provider or clinic, [phone]          Attending Provider     Provider Specialty    Joseph, Alex You MD Emergency Medicine    [facility], Unruly Etienne MD Internal Medicine    [facility], Ollie Espinosa MD Family Practice    [facility], Ismael SETHI MD [facility]       Primary Care Provider Office Phone # Fax #    Anna Naidu [phone]      Your next 10 appointments already scheduled     May 11, 2018 10:40 AM CDT   Office Visit with Anna Naidu MD   [facility] ([facility]    [address] [id number] [phone]           Bring a current list of meds and any records pertaining to this visit. For Physicals, please bring immunization records and any forms needing to be filled out. Please arrive 10 minutes early to complete paperwork.            May 21, 2018 12:20 PM CDT  "  (Arrive by 12:05 PM)   Return Visit with Zeferino Sanchez PA-C   [facility] ([facility])    [address]  4th Floor  [place] [address] [phone]              Further instructions from your care team       As a system [facility] wants to ensure that across the care continuum that you have support through care coordination services that include nurses and social workers in the outpatient setting.  Due to your multiple providers I feel it is important you have this support when you discharge.  They will be calling you within 24-48 hours of your discharge. A brochure describing the services was provided.  If you have questions you can reach out to your clinic directly and ask for the Care Coordinator assigned to your care.    Pending Results     Date and Time Order Name Status Description    5/6/2018 1351 Blood culture Preliminary     5/6/2018 1351 Blood culture Preliminary     5/6/2018 0059 Blood culture Preliminary     5/6/2018 0059 Blood culture Preliminary             Statement of Approval     Ordered          05/09/18 1316  I have reviewed and agree with all the recommendations and orders detailed in this document.  EFFECTIVE NOW     Approved and electronically signed by:  Ismael Romero MD             Admission Information     Date & Time Provider Department Dept. Phone    5/5/2018 Ismael Romero MD [facility] [place] [facility] Surgical [phone]      Your Vitals Were     Blood Pressure Pulse Temperature Respirations Height Weight    108/49 (BP Location: Left leg) 90 98.1  F (36.7  C) (Oral) 20 1.6 m (5' 3\") 96.9 kg (213 lb 10 oz)    Pulse Oximetry BMI (Body Mass Index)                96% 37.84 kg/m2          MyChart Information     [facility] gives you secure access to your electronic health record. If you see a primary care provider, you can also send messages to your care team and make appointments. If you have questions, please call your " primary care clinic.  If you do not have a primary care provider, please call [phone] and they will assist you.        Care EveryWhere ID     This is your Care EveryWhere ID. This could be used by other organizations to access your [facility] medical records  [id number]        Equal Access to Services     BRIGETTE QUARLES : Nicol ramos Soalphonso, waaxda luqadaha, qaybta kaalmada chris, pancho castillo. So [facility] [phone].    ATENCIÓN: Si habla español, tiene a mcdonnell disposición servicios gratuitos de asistencia lingüística. Llame al [phone].    We comply with applicable federal civil rights laws and [place] laws. We do not discriminate on the basis of race, color, national origin, age, disability, sex, sexual orientation, or gender identity.               Review of your medicines      START taking        Dose / Directions    Acidophilus Lactobacillus Caps   Used for:  Pneumonia due to infectious organism, unspecified laterality, unspecified part of lung   Notes to Patient:  New starting 5/09/18        Dose:  1 tablet   Take 1 tablet by mouth 3 times daily (with meals) for 4 days   Quantity:  12 capsule   Refills:  0       amoxicillin-clavulanate 875-125 MG per tablet   Commonly known as:  AUGMENTIN   Used for:  Pneumonia due to infectious organism, unspecified laterality, unspecified part of lung        Dose:  1 tablet   Start taking on:  5/10/2018   Take 1 tablet by mouth 2 times daily for 4 days   Quantity:  8 tablet   Refills:  0       doxycycline 100 MG capsule   Commonly known as:  VIBRAMYCIN   Indication:  Community Acquired Pneumonia   Used for:  Pneumonia due to infectious organism, unspecified laterality, unspecified part of lung        Dose:  100 mg   Take 1 capsule (100 mg) by mouth every 12 hours for 4 days   Quantity:  8 capsule   Refills:  0       oseltamivir 75 MG capsule   Commonly known as:  TAMIFLU   Indication:  Treatment to Prevent Influenza         "Dose:  75 mg   Take 1 capsule (75 mg) by mouth 2 times daily for 2 doses   Quantity:  2 capsule   Refills:  0         CONTINUE these medicines which may have CHANGED, or have new prescriptions. If we are uncertain of the size of tablets/capsules you have at home, strength may be listed as something that might have changed.        Dose / Directions    predniSONE 10 MG tablet   Commonly known as:  DELTASONE   This may have changed:  how much to take   Used for:  Malignant melanoma of left upper extremity including shoulder (H), Colitis        Dose:  20 mg   Take 2 tablets (20 mg) by mouth daily   Quantity:  100 tablet   Refills:  1         CONTINUE these medicines which have NOT CHANGED        Dose / Directions    hydrocortisone 100 MG/60ML enema   Commonly known as:  CORTENEMA   Used for:  Colitis   Notes to Patient:  Resume          1 enema every night for 21 days; then 1 enema every other night for 1 week and then stop   Quantity:  24 enema   Refills:  0       HYDROmorphone 4 MG tablet   Commonly known as:  DILAUDID   Used for:  Proctocolitis        Dose:  4-6 mg   Take 1-1.5 tablets (4-6 mg) by mouth every 3 hours as needed for moderate to severe pain   Quantity:  30 tablet   Refills:  0       LORazepam 0.5 MG tablet   Commonly known as:  ATIVAN   Used for:  Proctocolitis, Anxiety state, Metastatic malignant melanoma (H)        Dose:  0.5-1 mg   Take 1-2 tablets (0.5-1 mg) by mouth every 6 hours as needed for anxiety or other (nausea)   Quantity:  40 tablet   Refills:  0       mesalamine 1.2 g EC tablet   Commonly known as:  LIALDA   Used for:  Colitis        Dose:  4800 mg   Take 4 tablets (4,800 mg) by mouth every morning   Quantity:  120 tablet   Refills:  3       * order for DME   Used for:  Lymphedema of left arm        Equipment being ordered: 20-30mmHg compression sleeve and 20-30mmHg compression glove\" x2 pair   Quantity:  1 Units   Refills:  0       * order for DME   Used for:  Secondary lymphedema        " Equipment being ordered: x2 Wearease compression shirt   Quantity:  1 each   Refills:  0       promethazine 25 MG tablet   Commonly known as:  PHENERGAN   Used for:  Proctocolitis        Dose:  25 mg   Take 1 tablet (25 mg) by mouth every 4 hours as needed for nausea or vomiting   Quantity:  56 tablet   Refills:  0       rOPINIRole 0.25 MG tablet   Commonly known as:  REQUIP   Used for:  Restless leg syndrome        Dose:  0.25 mg   Take 1 tablet (0.25 mg) by mouth At Bedtime   Quantity:  30 tablet   Refills:  11       sulfamethoxazole-trimethoprim 400-80 MG per tablet   Commonly known as:  BACTRIM/SEPTRA        Dose:  1 tablet   Take 1 tablet by mouth daily While on high dose steroids (prednisone 20mg or greater)   Refills:  0       TYLENOL PO        Dose:  1000 mg   Take 1,000 mg by mouth every 8 hours as needed for mild pain or fever   Refills:  0       venlafaxine 150 MG Tb24 24 hr tablet   Commonly known as:  EFFEXOR-ER   Used for:  Mild major depression (H)        Dose:  150 mg   Take 1 tablet (150 mg) by mouth daily (with breakfast)   Quantity:  90 each   Refills:  1       Vitamin D3 3000 units Tabs   Used for:  Vitamin D deficiency   Notes to Patient:  Resume          Dose:  3000 Int'l Units   Take 3,000 Int'l Units by mouth daily   Quantity:  30 tablet   Refills:  3       * Notice:  This list has 2 medication(s) that are the same as other medications prescribed for you. Read the directions carefully, and ask your doctor or other care provider to review them with you.      STOP taking     hydrocortisone 10 % rectal foam   Commonly known as:  CORTIFOAM           hydrocortisone 25 MG Suppository   Commonly known as:  ANUSOL-HC           pramoxine 1 % foam   Commonly known as:  PROCTOFOAM                Where to get your medicines      These medications were sent to [facility] [place], [place] - [address]  [address] [address]     Phone:  [phone]     Acidophilus  Lactobacillus Caps    amoxicillin-clavulanate 875-125 MG per tablet    doxycycline 100 MG capsule    oseltamivir 75 MG capsule    promethazine 25 MG tablet         Some of these will need a paper prescription and others can be bought over the counter. Ask your nurse if you have questions.     Bring a paper prescription for each of these medications     HYDROmorphone 4 MG tablet                Protect others around you: Learn how to safely use, store and throw away your medicines at www.disposemymeds.org.        ANTIBIOTIC INSTRUCTION     You've Been Prescribed an Antibiotic - Now What?  Your healthcare team thinks that you or your loved one might have an infection. Some infections can be treated with antibiotics, which are powerful, life-saving drugs. Like all medications, antibiotics have side effects and should only be used when necessary. There are some important things you should know about your antibiotic treatment.      Your healthcare team may run tests before you start taking an antibiotic.    Your team may take samples (e.g., from your blood, urine or other areas) to run tests to look for bacteria. These test can be important to determine if you need an antibiotic at all and, if you do, which antibiotic will work best.      Within a few days, your healthcare team might change or even stop your antibiotic.    Your team may start you on an antibiotic while they are working to find out what is making you sick.    Your team might change your antibiotic because test results show that a different antibiotic would be better to treat your infection.    In some cases, once your team has more information, they learn that you do not need an antibiotic at all. They may find out that you don't have an infection, or that the antibiotic you're taking won't work against your infection. For example, an infection caused by a virus can't be treated with antibiotics. Staying on an antibiotic when you don't need it is more  likely to be harmful than helpful.      You may experience side effects from your antibiotic.    Like all medications, antibiotics have side effects. Some of these can be serious.    Let you healthcare team know if you have any known allergies when you are admitted to the hospital.    One significant side effect of nearly all antibiotics is the risk of severe and sometimes deadly diarrhea caused by Clostridium difficile (C. Difficile). This occurs when a person takes antibiotics because some good germs are destroyed. Antibiotic use allows C. diificile to take over, putting patients at high risk for this serious infection.    As a patient or caregiver, it is important to understand your or your loved one's antibiotic treatment. It is especially important for caregivers to speak up when patients can't speak for themselves. Here are some important questions to ask your healthcare team.    What infection is this antibiotic treating and how do you know I have that infection?    What side effects might occur from this antibiotic?    How long will I need to take this antibiotic?    Is it safe to take this antibiotic with other medications or supplements (e.g., vitamins) that I am taking?     Are there any special directions I need to know about taking this antibiotic? For example, should I take it with food?    How will I be monitored to know whether my infection is responding to the antibiotic?    What tests may help to make sure the right antibiotic is prescribed for me?      Information provided by:  www.cdc.gov/getsmart  U.S. Department of Health and Human Services  Centers for disease Control and Prevention  National Center for Emerging and Zoonotic Infectious Diseases  Division of Healthcare Quality Promotion        Information about OPIOIDS     PRESCRIPTION OPIOIDS: WHAT YOU NEED TO KNOW   You have a prescription for an opioid (narcotic) pain medicine. Opioids can cause addiction. If you have a history of chemical  dependency of any type, you are at a higher risk of becoming addicted to opioids. Only take this medicine after all other options have been tried. Take it for as short a time and as few doses as possible.     Do not:    Drive. If you drive while taking these medicines, you could be arrested for driving under the influence (DUI).    Operate heavy machinery    Do any other dangerous activities while taking these medicines.     Drink any alcohol while taking these medicines.      Take with any other medicines that contain acetaminophen. Read all labels carefully. Look for the word  acetaminophen  or  Tylenol.  Ask your pharmacist if you have questions or are unsure.    Store your pills in a secure place, locked if possible. We will not replace any lost or stolen medicine. If you don t finish your medicine, please throw away (dispose) as directed by your pharmacist. The [place] Pollution Control Agency has more information about safe disposal: https://www.pca.[facility].[place].us/living-green/managing-unwanted-medications    All opioids tend to cause constipation. Drink plenty of water and eat foods that have a lot of fiber, such as fruits, vegetables, prune juice, apple juice and high-fiber cereal. Take a laxative (Miralax, milk of magnesia, Colace, Senna) if you don t move your bowels at least every other day.              Medication List: This is a list of all your medications and when to take them. Check marks below indicate your daily home schedule. Keep this list as a reference.      Medications           Morning Afternoon Evening Bedtime As Needed    Acidophilus Lactobacillus Caps   Take 1 tablet by mouth 3 times daily (with meals) for 4 days   Next Dose Due:  5/09/18   Notes to Patient:  New starting 5/09/18                                   amoxicillin-clavulanate 875-125 MG per tablet   Commonly known as:  AUGMENTIN   Take 1 tablet by mouth 2 times daily for 4 days   Start taking on:  5/10/2018   Next Dose Due:   "5/10/18                                   doxycycline 100 MG capsule   Commonly known as:  VIBRAMYCIN   Take 1 capsule (100 mg) by mouth every 12 hours for 4 days   Last time this was given:  100 mg on 5/9/2018  8:17 AM   Next Dose Due:  5/09/18                                   hydrocortisone 100 MG/60ML enema   Commonly known as:  CORTENEMA   1 enema every night for 21 days; then 1 enema every other night for 1 week and then stop   Notes to Patient:  Resume                                  HYDROmorphone 4 MG tablet   Commonly known as:  DILAUDID   Take 1-1.5 tablets (4-6 mg) by mouth every 3 hours as needed for moderate to severe pain   Last time this was given:  6 mg on 5/9/2018 12:03 PM                                   LORazepam 0.5 MG tablet   Commonly known as:  ATIVAN   Take 1-2 tablets (0.5-1 mg) by mouth every 6 hours as needed for anxiety or other (nausea)   Last time this was given:  1 mg on 5/8/2018  9:41 PM                                   mesalamine 1.2 g EC tablet   Commonly known as:  LIALDA   Take 4 tablets (4,800 mg) by mouth every morning   Last time this was given:  4,800 mg on 5/9/2018  8:20 AM   Next Dose Due:  5/10/18                                   * order for DME   Equipment being ordered: 20-30mmHg compression sleeve and 20-30mmHg compression glove\" x2 pair                                * order for DME   Equipment being ordered: x2 Wearease compression shirt                                oseltamivir 75 MG capsule   Commonly known as:  TAMIFLU   Take 1 capsule (75 mg) by mouth 2 times daily for 2 doses   Last time this was given:  75 mg on 5/9/2018  8:18 AM   Next Dose Due:  5/09/18                                   predniSONE 10 MG tablet   Commonly known as:  DELTASONE   Take 2 tablets (20 mg) by mouth daily   Last time this was given:  60 mg on 5/9/2018  8:18 AM   Next Dose Due:  5/10/18                                   promethazine 25 MG tablet   Commonly known as:  PHENERGAN "   Take 1 tablet (25 mg) by mouth every 4 hours as needed for nausea or vomiting   Last time this was given:  5/09/18 10:00 am had IV dose                                   rOPINIRole 0.25 MG tablet   Commonly known as:  REQUIP   Take 1 tablet (0.25 mg) by mouth At Bedtime   Last time this was given:  0.25 mg on 5/8/2018  9:39 PM   Next Dose Due:  5/09/18                                   sulfamethoxazole-trimethoprim 400-80 MG per tablet   Commonly known as:  BACTRIM/SEPTRA   Take 1 tablet by mouth daily While on high dose steroids (prednisone 20mg or greater)   Last time this was given:  1 tablet on 5/9/2018  8:19 AM   Next Dose Due:  5/10/18                                   TYLENOL PO   Take 1,000 mg by mouth every 8 hours as needed for mild pain or fever   Last time this was given:  1,000 mg on 5/9/2018  8:16 AM                                   venlafaxine 150 MG Tb24 24 hr tablet   Commonly known as:  EFFEXOR-ER   Take 1 tablet (150 mg) by mouth daily (with breakfast)   Last time this was given:  150 mg on 5/9/2018  8:18 AM   Next Dose Due:  5/10/18                                   Vitamin D3 3000 units Tabs   Take 3,000 Int'l Units by mouth daily   Notes to Patient:  Resume                                  * Notice:  This list has 2 medication(s) that are the same as other medications prescribed for you. Read the directions carefully, and ask your doctor or other care provider to review them with you.

## 2018-05-05 NOTE — IP AVS SNAPSHOT
[facility]    [address] [address]    Phone:  [phone]    Fax:  [phone]                                       After Visit Summary   5/5/2018    Doretha Fernandez    MRN: [id number]           After Visit Summary Signature Page     I have received my discharge instructions, and my questions have been answered. I have discussed any challenges I see with this plan with the nurse or doctor.    ..........................................................................................................................................  Patient/Patient Representative Signature      ..........................................................................................................................................  Patient Representative Print Name and Relationship to Patient    ..................................................               ................................................  Date                                            Time    ..........................................................................................................................................  Reviewed by Signature/Title    ...................................................              ..............................................  Date                                                            Time

## 2018-05-06 ENCOUNTER — HEALTH MAINTENANCE LETTER (OUTPATIENT)
Age: 43
End: 2018-05-06

## 2018-05-06 ENCOUNTER — APPOINTMENT (OUTPATIENT)
Dept: GENERAL RADIOLOGY | Facility: CLINIC | Age: 43
DRG: 871 | End: 2018-05-06
Attending: PHYSICIAN ASSISTANT
Payer: COMMERCIAL

## 2018-05-06 PROBLEM — J11.1 INFLUENZA-LIKE ILLNESS: Status: ACTIVE | Noted: 2018-05-06

## 2018-05-06 PROBLEM — A41.9 SEPSIS DUE TO CELLULITIS (H): Status: RESOLVED | Noted: 2017-11-14 | Resolved: 2018-05-06

## 2018-05-06 PROBLEM — G44.209 ACUTE NON INTRACTABLE TENSION-TYPE HEADACHE: Status: RESOLVED | Noted: 2017-04-01 | Resolved: 2018-05-06

## 2018-05-06 PROBLEM — L03.90 SEPSIS DUE TO CELLULITIS (H): Status: RESOLVED | Noted: 2017-11-14 | Resolved: 2018-05-06

## 2018-05-06 PROBLEM — G89.29 OTHER CHRONIC PAIN: Status: ACTIVE | Noted: 2018-05-06

## 2018-05-06 LAB
BASOPHILS # BLD AUTO: 0 10E9/L (ref 0–0.2)
BASOPHILS NFR BLD AUTO: 0 %
DIFFERENTIAL METHOD BLD: ABNORMAL
EOSINOPHIL # BLD AUTO: 0 10E9/L (ref 0–0.7)
EOSINOPHIL NFR BLD AUTO: 0 %
ERYTHROCYTE [DISTWIDTH] IN BLOOD BY AUTOMATED COUNT: 15 % (ref 10–15)
HCT VFR BLD AUTO: 39.5 % (ref 35–47)
HGB BLD-MCNC: 12.9 G/DL (ref 11.7–15.7)
LACTATE BLD-SCNC: 2.4 MMOL/L (ref 0.4–1.9)
LACTATE BLD-SCNC: 2.4 MMOL/L (ref 0.7–2)
LACTATE BLD-SCNC: 2.7 MMOL/L (ref 0.7–2)
LYMPHOCYTES # BLD AUTO: 2 10E9/L (ref 0.8–5.3)
LYMPHOCYTES NFR BLD AUTO: 22 %
MCH RBC QN AUTO: 28.5 PG (ref 26.5–33)
MCHC RBC AUTO-ENTMCNC: 32.7 G/DL (ref 31.5–36.5)
MCV RBC AUTO: 87 FL (ref 78–100)
METAMYELOCYTES # BLD: 0.6 10E9/L
METAMYELOCYTES NFR BLD MANUAL: 7 %
MONOCYTES # BLD AUTO: 0.1 10E9/L (ref 0–1.3)
MONOCYTES NFR BLD AUTO: 1 %
MYELOCYTES # BLD: 0.2 10E9/L
MYELOCYTES NFR BLD MANUAL: 2 %
NEUTROPHILS # BLD AUTO: 6.3 10E9/L (ref 1.6–8.3)
NEUTROPHILS NFR BLD AUTO: 68 %
PLATELET # BLD AUTO: 130 10E9/L (ref 150–450)
PLATELET # BLD EST: ABNORMAL 10*3/UL
PROCALCITONIN SERPL-MCNC: 1.51 NG/ML
RBC # BLD AUTO: 4.53 10E12/L (ref 3.8–5.2)
RBC MORPH BLD: NORMAL
WBC # BLD AUTO: 9.2 10E9/L (ref 4–11)

## 2018-05-06 PROCEDURE — 85025 COMPLETE CBC W/AUTO DIFF WBC: CPT | Performed by: FAMILY MEDICINE

## 2018-05-06 PROCEDURE — 25000132 ZZH RX MED GY IP 250 OP 250 PS 637: Performed by: FAMILY MEDICINE

## 2018-05-06 PROCEDURE — 25000128 H RX IP 250 OP 636: Performed by: INTERNAL MEDICINE

## 2018-05-06 PROCEDURE — 87040 BLOOD CULTURE FOR BACTERIA: CPT | Performed by: FAMILY MEDICINE

## 2018-05-06 PROCEDURE — 87040 BLOOD CULTURE FOR BACTERIA: CPT | Performed by: EMERGENCY MEDICINE

## 2018-05-06 PROCEDURE — 99207 ZZC CDG-CHARGE REQUIRED MANUAL ENTRY: CPT | Performed by: FAMILY MEDICINE

## 2018-05-06 PROCEDURE — 25000128 H RX IP 250 OP 636: Performed by: FAMILY MEDICINE

## 2018-05-06 PROCEDURE — 87633 RESP VIRUS 12-25 TARGETS: CPT | Performed by: FAMILY MEDICINE

## 2018-05-06 PROCEDURE — 25000131 ZZH RX MED GY IP 250 OP 636 PS 637: Performed by: FAMILY MEDICINE

## 2018-05-06 PROCEDURE — 84145 PROCALCITONIN (PCT): CPT | Performed by: FAMILY MEDICINE

## 2018-05-06 PROCEDURE — 25000132 ZZH RX MED GY IP 250 OP 250 PS 637: Performed by: EMERGENCY MEDICINE

## 2018-05-06 PROCEDURE — 12000000 ZZH R&B MED SURG/OB

## 2018-05-06 PROCEDURE — 25000128 H RX IP 250 OP 636: Performed by: PHYSICIAN ASSISTANT

## 2018-05-06 PROCEDURE — 99222 1ST HOSP IP/OBS MODERATE 55: CPT | Mod: AI | Performed by: FAMILY MEDICINE

## 2018-05-06 PROCEDURE — 25000128 H RX IP 250 OP 636: Performed by: EMERGENCY MEDICINE

## 2018-05-06 PROCEDURE — 71046 X-RAY EXAM CHEST 2 VIEWS: CPT

## 2018-05-06 PROCEDURE — 36415 COLL VENOUS BLD VENIPUNCTURE: CPT | Performed by: FAMILY MEDICINE

## 2018-05-06 PROCEDURE — 25000125 ZZHC RX 250: Performed by: FAMILY MEDICINE

## 2018-05-06 PROCEDURE — 83605 ASSAY OF LACTIC ACID: CPT | Performed by: FAMILY MEDICINE

## 2018-05-06 RX ORDER — SULFAMETHOXAZOLE AND TRIMETHOPRIM 400; 80 MG/1; MG/1
1 TABLET ORAL DAILY
Status: DISCONTINUED | OUTPATIENT
Start: 2018-05-06 | End: 2018-05-09 | Stop reason: HOSPADM

## 2018-05-06 RX ORDER — ROPINIROLE 0.25 MG/1
0.25 TABLET, FILM COATED ORAL AT BEDTIME
Status: DISCONTINUED | OUTPATIENT
Start: 2018-05-06 | End: 2018-05-09 | Stop reason: HOSPADM

## 2018-05-06 RX ORDER — PROCHLORPERAZINE MALEATE 10 MG
10 TABLET ORAL EVERY 6 HOURS PRN
Status: DISCONTINUED | OUTPATIENT
Start: 2018-05-06 | End: 2018-05-09 | Stop reason: HOSPADM

## 2018-05-06 RX ORDER — ONDANSETRON 4 MG/1
4 TABLET, ORALLY DISINTEGRATING ORAL EVERY 6 HOURS PRN
Status: DISCONTINUED | OUTPATIENT
Start: 2018-05-06 | End: 2018-05-09 | Stop reason: HOSPADM

## 2018-05-06 RX ORDER — IBUPROFEN 600 MG/1
600 TABLET, FILM COATED ORAL EVERY 6 HOURS PRN
Status: DISCONTINUED | OUTPATIENT
Start: 2018-05-06 | End: 2018-05-09 | Stop reason: HOSPADM

## 2018-05-06 RX ORDER — NALOXONE HYDROCHLORIDE 0.4 MG/ML
.1-.4 INJECTION, SOLUTION INTRAMUSCULAR; INTRAVENOUS; SUBCUTANEOUS
Status: DISCONTINUED | OUTPATIENT
Start: 2018-05-06 | End: 2018-05-08

## 2018-05-06 RX ORDER — LORAZEPAM 0.5 MG/1
.5-1 TABLET ORAL EVERY 6 HOURS PRN
Status: DISCONTINUED | OUTPATIENT
Start: 2018-05-06 | End: 2018-05-09 | Stop reason: HOSPADM

## 2018-05-06 RX ORDER — ONDANSETRON 2 MG/ML
4 INJECTION INTRAMUSCULAR; INTRAVENOUS EVERY 6 HOURS PRN
Status: DISCONTINUED | OUTPATIENT
Start: 2018-05-06 | End: 2018-05-09 | Stop reason: HOSPADM

## 2018-05-06 RX ORDER — MESALAMINE 1.2 G/1
4800 TABLET, DELAYED RELEASE ORAL EVERY MORNING
Status: DISCONTINUED | OUTPATIENT
Start: 2018-05-06 | End: 2018-05-09 | Stop reason: HOSPADM

## 2018-05-06 RX ORDER — SODIUM CHLORIDE, SODIUM LACTATE, POTASSIUM CHLORIDE, CALCIUM CHLORIDE 600; 310; 30; 20 MG/100ML; MG/100ML; MG/100ML; MG/100ML
1000 INJECTION, SOLUTION INTRAVENOUS CONTINUOUS
Status: DISCONTINUED | OUTPATIENT
Start: 2018-05-06 | End: 2018-05-07

## 2018-05-06 RX ORDER — MORPHINE SULFATE 2 MG/ML
2-4 INJECTION, SOLUTION INTRAMUSCULAR; INTRAVENOUS
Status: DISCONTINUED | OUTPATIENT
Start: 2018-05-06 | End: 2018-05-09 | Stop reason: HOSPADM

## 2018-05-06 RX ORDER — PROMETHAZINE HYDROCHLORIDE 25 MG/1
25 TABLET ORAL EVERY 4 HOURS PRN
Status: ON HOLD | COMMUNITY
End: 2018-05-09

## 2018-05-06 RX ORDER — ACETAMINOPHEN 500 MG
TABLET ORAL
Status: DISCONTINUED
Start: 2018-05-06 | End: 2018-05-06 | Stop reason: HOSPADM

## 2018-05-06 RX ORDER — VENLAFAXINE HYDROCHLORIDE 150 MG/1
150 TABLET, EXTENDED RELEASE ORAL
Status: DISCONTINUED | OUTPATIENT
Start: 2018-05-07 | End: 2018-05-06

## 2018-05-06 RX ORDER — PROMETHAZINE HYDROCHLORIDE 25 MG/1
25 TABLET ORAL EVERY 4 HOURS PRN
Status: DISCONTINUED | OUTPATIENT
Start: 2018-05-06 | End: 2018-05-09 | Stop reason: HOSPADM

## 2018-05-06 RX ORDER — OSELTAMIVIR PHOSPHATE 75 MG/1
75 CAPSULE ORAL 2 TIMES DAILY
Status: DISCONTINUED | OUTPATIENT
Start: 2018-05-06 | End: 2018-05-09 | Stop reason: HOSPADM

## 2018-05-06 RX ORDER — ACETAMINOPHEN 500 MG
1000 TABLET ORAL ONCE
Status: COMPLETED | OUTPATIENT
Start: 2018-05-06 | End: 2018-05-06

## 2018-05-06 RX ORDER — HYDROCORTISONE ACETATE 25 MG/1
25 SUPPOSITORY RECTAL 2 TIMES DAILY
Status: DISCONTINUED | OUTPATIENT
Start: 2018-05-06 | End: 2018-05-07

## 2018-05-06 RX ORDER — HYDROMORPHONE HYDROCHLORIDE 2 MG/1
4-6 TABLET ORAL
Status: DISCONTINUED | OUTPATIENT
Start: 2018-05-06 | End: 2018-05-09 | Stop reason: HOSPADM

## 2018-05-06 RX ORDER — PROMETHAZINE HYDROCHLORIDE 25 MG/ML
25 INJECTION, SOLUTION INTRAMUSCULAR; INTRAVENOUS EVERY 6 HOURS PRN
Status: DISCONTINUED | OUTPATIENT
Start: 2018-05-06 | End: 2018-05-09 | Stop reason: HOSPADM

## 2018-05-06 RX ORDER — POLYETHYLENE GLYCOL 3350 17 G/17G
17 POWDER, FOR SOLUTION ORAL DAILY PRN
Status: DISCONTINUED | OUTPATIENT
Start: 2018-05-06 | End: 2018-05-09 | Stop reason: HOSPADM

## 2018-05-06 RX ORDER — PROCHLORPERAZINE 25 MG
25 SUPPOSITORY, RECTAL RECTAL EVERY 12 HOURS PRN
Status: DISCONTINUED | OUTPATIENT
Start: 2018-05-06 | End: 2018-05-09 | Stop reason: HOSPADM

## 2018-05-06 RX ORDER — ACETAMINOPHEN 325 MG/1
650 TABLET ORAL EVERY 4 HOURS PRN
Status: DISCONTINUED | OUTPATIENT
Start: 2018-05-06 | End: 2018-05-06

## 2018-05-06 RX ORDER — SULFAMETHOXAZOLE AND TRIMETHOPRIM 400; 80 MG/1; MG/1
1 TABLET ORAL DAILY
COMMUNITY
End: 2018-11-14

## 2018-05-06 RX ORDER — KETOROLAC TROMETHAMINE 10 MG/1
10 TABLET, FILM COATED ORAL EVERY 4 HOURS PRN
Status: DISCONTINUED | OUTPATIENT
Start: 2018-05-06 | End: 2018-05-06 | Stop reason: CLARIF

## 2018-05-06 RX ORDER — ACETAMINOPHEN 500 MG
1000 TABLET ORAL EVERY 8 HOURS PRN
Status: DISCONTINUED | OUTPATIENT
Start: 2018-05-06 | End: 2018-05-09 | Stop reason: HOSPADM

## 2018-05-06 RX ORDER — VENLAFAXINE HYDROCHLORIDE 150 MG/1
150 TABLET, EXTENDED RELEASE ORAL
Status: DISCONTINUED | OUTPATIENT
Start: 2018-05-06 | End: 2018-05-09 | Stop reason: HOSPADM

## 2018-05-06 RX ORDER — NALOXONE HYDROCHLORIDE 0.4 MG/ML
.1-.4 INJECTION, SOLUTION INTRAMUSCULAR; INTRAVENOUS; SUBCUTANEOUS
Status: DISCONTINUED | OUTPATIENT
Start: 2018-05-06 | End: 2018-05-09 | Stop reason: HOSPADM

## 2018-05-06 RX ADMIN — HYDROMORPHONE HYDROCHLORIDE 4 MG: 2 TABLET ORAL at 19:38

## 2018-05-06 RX ADMIN — SODIUM CHLORIDE, POTASSIUM CHLORIDE, SODIUM LACTATE AND CALCIUM CHLORIDE 1000 ML: 600; 310; 30; 20 INJECTION, SOLUTION INTRAVENOUS at 01:21

## 2018-05-06 RX ADMIN — MORPHINE SULFATE 2 MG: 2 INJECTION, SOLUTION INTRAMUSCULAR; INTRAVENOUS at 05:52

## 2018-05-06 RX ADMIN — ACETAMINOPHEN 650 MG: 325 TABLET, FILM COATED ORAL at 10:59

## 2018-05-06 RX ADMIN — PREDNISONE 60 MG: 50 TABLET ORAL at 12:05

## 2018-05-06 RX ADMIN — HYDROMORPHONE HYDROCHLORIDE 4 MG: 2 TABLET ORAL at 12:05

## 2018-05-06 RX ADMIN — SODIUM CHLORIDE 500 ML: 9 INJECTION, SOLUTION INTRAVENOUS at 14:05

## 2018-05-06 RX ADMIN — OSELTAMIVIR PHOSPHATE 75 MG: 75 CAPSULE ORAL at 17:20

## 2018-05-06 RX ADMIN — ACETAMINOPHEN 1000 MG: 500 TABLET ORAL at 15:42

## 2018-05-06 RX ADMIN — MESALAMINE 4800 MG: 1.2 TABLET, DELAYED RELEASE ORAL at 12:05

## 2018-05-06 RX ADMIN — MORPHINE SULFATE 4 MG: 2 INJECTION, SOLUTION INTRAMUSCULAR; INTRAVENOUS at 13:59

## 2018-05-06 RX ADMIN — ACETAMINOPHEN 650 MG: 325 TABLET, FILM COATED ORAL at 07:03

## 2018-05-06 RX ADMIN — HYDROMORPHONE HYDROCHLORIDE 4 MG: 2 TABLET ORAL at 15:42

## 2018-05-06 RX ADMIN — OSELTAMIVIR PHOSPHATE 75 MG: 75 CAPSULE ORAL at 09:01

## 2018-05-06 RX ADMIN — SODIUM CHLORIDE, POTASSIUM CHLORIDE, SODIUM LACTATE AND CALCIUM CHLORIDE 1000 ML: 600; 310; 30; 20 INJECTION, SOLUTION INTRAVENOUS at 23:04

## 2018-05-06 RX ADMIN — MORPHINE SULFATE 2 MG: 2 INJECTION, SOLUTION INTRAMUSCULAR; INTRAVENOUS at 05:16

## 2018-05-06 RX ADMIN — SODIUM CHLORIDE, POTASSIUM CHLORIDE, SODIUM LACTATE AND CALCIUM CHLORIDE 1000 ML: 600; 310; 30; 20 INJECTION, SOLUTION INTRAVENOUS at 20:56

## 2018-05-06 RX ADMIN — ROPINIROLE HYDROCHLORIDE 0.25 MG: 0.25 TABLET, FILM COATED ORAL at 19:38

## 2018-05-06 RX ADMIN — VENLAFAXINE HYDROCHLORIDE 150 MG: 150 TABLET, EXTENDED RELEASE ORAL at 12:47

## 2018-05-06 RX ADMIN — PROMETHAZINE HYDROCHLORIDE 25 MG: 25 INJECTION INTRAMUSCULAR; INTRAVENOUS at 09:59

## 2018-05-06 RX ADMIN — SODIUM CHLORIDE, POTASSIUM CHLORIDE, SODIUM LACTATE AND CALCIUM CHLORIDE 1000 ML: 600; 310; 30; 20 INJECTION, SOLUTION INTRAVENOUS at 07:01

## 2018-05-06 RX ADMIN — MORPHINE SULFATE 4 MG: 2 INJECTION, SOLUTION INTRAMUSCULAR; INTRAVENOUS at 08:53

## 2018-05-06 RX ADMIN — MORPHINE SULFATE 2 MG: 2 INJECTION, SOLUTION INTRAMUSCULAR; INTRAVENOUS at 02:59

## 2018-05-06 RX ADMIN — SODIUM CHLORIDE, POTASSIUM CHLORIDE, SODIUM LACTATE AND CALCIUM CHLORIDE 1000 ML: 600; 310; 30; 20 INJECTION, SOLUTION INTRAVENOUS at 15:46

## 2018-05-06 RX ADMIN — ACETAMINOPHEN 1000 MG: 500 TABLET ORAL at 01:21

## 2018-05-06 RX ADMIN — LORAZEPAM 1 MG: 0.5 TABLET ORAL at 19:38

## 2018-05-06 RX ADMIN — SULFAMETHOXAZOLE AND TRIMETHOPRIM 1 TABLET: 400; 80 TABLET ORAL at 12:06

## 2018-05-06 RX ADMIN — MORPHINE SULFATE 4 MG: 2 INJECTION, SOLUTION INTRAMUSCULAR; INTRAVENOUS at 18:04

## 2018-05-06 RX ADMIN — MORPHINE SULFATE 2 MG: 2 INJECTION, SOLUTION INTRAMUSCULAR; INTRAVENOUS at 00:08

## 2018-05-06 RX ADMIN — ONDANSETRON 4 MG: 2 INJECTION INTRAMUSCULAR; INTRAVENOUS at 04:09

## 2018-05-06 RX ADMIN — OSELTAMIVIR PHOSPHATE 75 MG: 75 CAPSULE ORAL at 00:28

## 2018-05-06 NOTE — PLAN OF CARE
"Problem: Patient Care Overview  Goal: Plan of Care/Patient Progress Review  Pt chilled, temp 100.1. States having pain in left abdomen, describes as feeling bloated, \"feels like pushing up on diaphragm making it hard to breathe.\" also states it makes her feel nauseous. Morphine 2mg iv given for pain with good relief. Pt states nausea now better as well. Taking sips of water.  Dr Boland paged for nausea/vomiting protocol.       "

## 2018-05-06 NOTE — PLAN OF CARE
Notified MD at 0313 AM regarding Request for medication.      Spoke with: Dr. Boland    Orders were obtained.    Comments: Paged  (Requesting Nausea meds. Thanks.)

## 2018-05-06 NOTE — H&P
[facility]   [facility] and Physical    Doretha Fernandez MRN# [id number]   Age: 43 year old YOB: 1975     Date of Admission:  5/5/2018    Home clinic: [facility]  Primary care provider: Anna Naidu         Assessment and Plan:         Influenza-like illness  Assessment: Patient here with influenza like symptoms   Plan: Initial screening for influenza was negative. Will get PCR. She is on Tamiflu.      Active Problems:  Mild major depression (H)  Assessment: No new symptoms   Plan: Continue at home medication       Metastatic malignant melanoma (H)  Assessment: Sees oncology   Plan: Continue outpatient treatment       Colitis  Assessment: Ws getting infusions at [place]  and this has helped   Plan: Monitor       Other chronic pain    Assessment: Oral pain medication     Plan: As above         Anticipate 2-3 nights  Disposition - Stable  DVT prophylaxis - Ambulate             Chief Complaint:   Weakness  Fevers  Body aches    History is obtained from the patient          History of Present Illness:   This patient is a 43 year old  female with a significant past medical history of metastatic malignanat melanoma,colitis and depression   who presents with the following condition requiring a hospital admission:    Patient is a 43 yr old female here for the above symptoms. According to her , her symptoms started suddenly yesterday around 5 she had chills and on taking her temp she had a 101 temp. She also experienced generalized body aches. Patient reports exposure to Influenza B from her daughter. She reports no cough and no URI symptoms. She denies any shortness of breath or chest tightness. She says she is in a lot of pain . She also mentioned abdominal distention and pain. She has been struggling with colitis for a couple of months and reports that she was getting some infusions at the [facility] which helped. Her last infusion was sometime this last week. She  says she feels like she is having a bout of the colitis presently but a CT done in the ED showed improvement in the colitis. She was started empirically on Tamiflu though her initial swab came back negative.         Past Medical History:     Patient Active Problem List    Diagnosis Date Noted     Influenza-like illness 05/06/2018     Priority: Medium     Other chronic pain 05/06/2018     Priority: Medium     Rash and nonspecific skin eruption 04/05/2018     Priority: Medium     Bilateral leg cramps 03/07/2018     Priority: Medium     Intestinal giardiasis 03/05/2018     Priority: Medium     Rectal bleeding 03/04/2018     Priority: Medium     Diarrhea 03/04/2018     Priority: Medium     Colitis 03/01/2018     Priority: Medium     Functional diarrhea 02/22/2018     Priority: Medium     Melanoma (H) 10/23/2017     Priority: Medium     Malignant melanoma of left upper extremity including shoulder (H) 10/12/2017     Priority: Medium     Metastatic malignant melanoma (H) 10/10/2017     Priority: Medium     Prothrombin mutation (H) 04/05/2017     Priority: Medium     On daily aspirin 81 mg per hematology's recommendations from 2008       Vision changes 04/01/2017     Priority: Medium     Mild intermittent asthma without complication 11/08/2013     Priority: Medium     Mild major depression (H) 06/24/2013     Priority: Medium     Anxiety state 09/13/2012     Priority: Medium     Problem list name updated by automated process. Provider to review       Esophageal reflux 09/13/2012     Priority: Medium     DUB (dysfunctional uterine bleeding) 07/28/2011     Priority: Medium     CARDIOVASCULAR SCREENING; LDL GOAL LESS THAN 160 07/28/2011     Priority: Low               Past Surgical History:      Past Surgical History:   Procedure Laterality Date     COLONOSCOPY N/A 10/18/2017    Procedure: COLONOSCOPY;  Colon;  Surgeon: Debbie Stephens MD;  Location: [facility] OR     COLONOSCOPY N/A 3/9/2018    Procedure: COMBINED COLONOSCOPY, SINGLE  OR MULTIPLE BIOPSY/POLYPECTOMY BY BIOPSY;  colon  ;  Surgeon: Benita Schumacher MD;  Location: [facility] GI     DISSECT LYMPH NODE AXILLA Left 10/23/2017    Procedure: DISSECT LYMPH NODE AXILLA;  Left Axillary Lymph Node Dissection ;  Surgeon: Laurent Cool MD;  Location: [facility] OR     GYN SURGERY           REPAIR MOHS Left 2017    Procedure: REPAIR MOHS;  Left Upper Lid Moh's Reconstruction;  Surgeon: Kisha Bosch MD;  Location:  OR             Social History:     Social History     Social History     Marital status:      Spouse name: N/A     Number of children: N/A     Years of education: N/A     Occupational History     Not on file.     Social History Main Topics     Smoking status: Passive Smoke Exposure - Never Smoker     Last attempt to quit: 3/20/1998     Smokeless tobacco: Never Used     Alcohol use No      Comment: occ     Drug use: No     Sexual activity: Yes     Partners: Male     Birth control/ protection: Surgical     Other Topics Concern     Parent/Sibling W/ Cabg, Mi Or Angioplasty Before 65f 55m? No     Social History Narrative    20 y.o- patient's mother   of lung cancer. She had to take care of her younger sister.    2012- patient's  had a heart attack with stents placed, followed by cardiac rehabilitation    2000 TO 2012  was in [facility] for depression    2013 patient's  went through alcohol rehabilitation at [facility] inpatient            They have attended couple counseling a couple of times and patient went to the family program for chemical dependency.    Patient denies alcohol or drug use and herself            Has 2 children, girls ages 10 and 13     For a while she was working 3 jobs since her  was ill. Works at the [employer] for [employer]. Reports her job is very stressful.                  Family History:     Family History   Problem Relation Age of Onset     CANCER  Mother 45     lung     Neurologic Disorder Mother      Lipids Father      GASTROINTESTINAL DISEASE Father      Depression Father      CANCER Maternal Grandmother      Blood Disease Maternal Grandmother      Arthritis Maternal Grandmother      DIABETES Maternal Grandmother      Depression Maternal Grandmother      Macular Degeneration Maternal Grandmother      Glaucoma Maternal Grandmother      DIABETES Maternal Grandfather      CEREBROVASCULAR DISEASE Maternal Grandfather      Blood Disease Maternal Grandfather      HEART DISEASE Maternal Grandfather      Glaucoma Maternal Grandfather      CANCER Paternal Grandmother      Cancer - colorectal Paternal Grandmother      Respiratory Paternal Grandfather      Blood Disease Paternal Grandfather      HEART DISEASE Daughter      Asthma Daughter      Depression Sister         Allergies:     Allergies   Allergen Reactions     Compazine [Prochlorperazine] Fatigue     Fentanyl Other (See Comments)     sweating     Erythrocin Nausea and Vomiting     Zithromax [Azithromycin Dihydrate] Diarrhea          Medications:     Prescriptions Prior to Admission   Medication Sig Dispense Refill Last Dose     predniSONE (DELTASONE) 10 MG tablet Take 2 tablets (20 mg) by mouth daily (Patient taking differently: Take 60 mg by mouth daily ) 100 tablet 1 5/5/2018 at am     promethazine (PHENERGAN) 25 MG tablet Take 25 mg by mouth every 4 hours as needed for nausea or vomiting   5/4/2018     sulfamethoxazole-trimethoprim (BACTRIM/SEPTRA) 400-80 MG per tablet Take 1 tablet by mouth daily While on high dose steroids (prednisone 20mg or greater)   5/5/2018 at am     Acetaminophen (TYLENOL PO) Take 1,000 mg by mouth every 8 hours as needed for mild pain or fever   5/5/2018 at am     Cholecalciferol (VITAMIN D3) 3000 units TABS Take 3,000 Int'l Units by mouth daily 30 tablet 3 5/5/2018 at am     hydrocortisone (ANUSOL-HC) 25 MG Suppository Place 1 suppository (25 mg) rectally 2 times daily 56  "suppository 3      hydrocortisone (CORTENEMA) 100 MG/60ML enema 1 enema every night for 21 days; then 1 enema every other night for 1 week and then stop (Patient not taking: Reported on 4/13/2018) 24 enema 0 Not Taking     hydrocortisone (CORTIFOAM) 10 % rectal foam Place 1 applicator rectally daily 15 g 3      HYDROmorphone (DILAUDID) 4 MG tablet Take 1-1.5 tablets (4-6 mg) by mouth every 3 hours as needed for moderate to severe pain (Patient not taking: Reported on 4/13/2018) 170 tablet 0 Not Taking     LORazepam (ATIVAN) 0.5 MG tablet Take 1-2 tablets (0.5-1 mg) by mouth every 6 hours as needed for anxiety or other (nausea) 40 tablet 0 5/4/2018 at hs     mesalamine (LIALDA) 1.2 g EC tablet Take 4 tablets (4,800 mg) by mouth every morning 120 tablet 3 5/5/2018 at am     order for DME Equipment being ordered: 20-30mmHg compression sleeve and 20-30mmHg compression glove\" x2 pair 1 Units 0 Taking     order for DME Equipment being ordered: x2 Wearease compression shirt 1 each 0 Taking     pramoxine (PROCTOFOAM) 1 % foam Place rectally 2 times daily 15 g 1      rOPINIRole (REQUIP) 0.25 MG tablet Take 1 tablet (0.25 mg) by mouth At Bedtime 30 tablet 11 5/4/2018 at hs     venlafaxine (EFFEXOR-ER) 150 MG TB24 24 hr tablet Take 1 tablet (150 mg) by mouth daily (with breakfast) 90 each 1 5/5/2018 at am        Review of Systems:   A 10 point review of systems was performed and all else is negative except as stated in the HPI         Physical Exam:   Initial vitals were reviewed   Blood pressure 110/68, pulse 98, temperature 103  F (39.4  C), temperature source Oral, resp. rate 18, height 1.6 m (5' 3\"), SpO2 96 %, not currently breastfeeding.  Constitutional:   awake, alert, cooperative, mild distress, appears stated age and moderately obese   Eyes:   Lids and lashes normal, pupils equal, round and reactive to light, extra ocular muscles intact, sclera clear, conjunctiva normal   ENT:   normocepalic, without obvious " abnormality   Neck:   supple, symmetrical, trachea midline   Hematologic / Lymphatic:   no cervical lymphadenopathy   Back:   Symmetric, no curvature, spinous processes are non-tender on palpation, paraspinous muscles are non-tender on palpation, no costal vertebral tenderness   Lungs:   No increased work of breathing, good air exchange, clear to auscultation bilaterally, no crackles or wheezing   Cardiovascular:   Normal apical impulse, regular rate and rhythm, normal S1 and S2, no S3 or S4, and no murmur noted   Abdomen:   No scars, normal bowel sounds, soft, non-distended, non-tender, no masses palpated, no hepatosplenomegally   Genitounirinary:   deferred   Musculoskeletal:   no lower extremity pitting edema present   Neurologic:   Awake, alert, oriented to name, place and time.  Cranial nerves II-XII are grossly intact.  Motor is 5 out of 5 bilaterally.  Cerebellar finger to nose, heel to shin intact.  Sensory is intact.  Babinski down going, Romberg negative, and gait is normal.   Skin:   normal skin color, texture, turgor          Data:   All laboratory data reviewed    Results for orders placed or performed during the hospital encounter of 05/05/18   CT Abdomen Pelvis w Contrast    Narrative    CT ABDOMEN PELVIS W CONTRAST   5/5/2018 11:01 PM     HISTORY: Abdominal pain; history of drug induced colitis.     TECHNIQUE:  CT abdomen and pelvis with 88 mL Isovue-370 IV. Radiation  dose for this scan was reduced using automated exposure control,  adjustment of the mA and/or kV according to patient size, or iterative  reconstruction technique.    COMPARISON: 3/4/2018.    FINDINGS:  Abdomen: There is mild dependent atelectasis at the lung bases. Tiny  probable cyst in the left lobe of the liver. The spleen and  gallbladder appear normal. There is atrophy of the body and tail of  the pancreas. The adrenal glands and kidneys are normal in appearance.  There is no abdominal or pelvic lymph node enlargement.    Pelvis:  There is no bowel obstruction or inflammation. Moderate amount  of stool in the colon. No free intraperitoneal gas or fluid. The  uterus and adnexa are unremarkable.      Impression    IMPRESSION: No acute abnormality. No bowel obstruction or  inflammation.    LYNDSEY ALTAMIRANO MD   CBC with platelets differential   Result Value Ref Range    WBC 12.0 (H) 4.0 - 11.0 10e9/L    RBC Count 4.13 3.8 - 5.2 10e12/L    Hemoglobin 11.8 11.7 - 15.7 g/dL    Hematocrit 35.9 35.0 - 47.0 %    MCV 87 78 - 100 fl    MCH 28.6 26.5 - 33.0 pg    MCHC 32.9 31.5 - 36.5 g/dL    RDW 14.3 10.0 - 15.0 %    Platelet Count 201 150 - 450 10e9/L    Diff Method Manual Differential     % Neutrophils 89.5 %    % Lymphocytes 9.5 %    % Monocytes 0.0 %    % Eosinophils 0.0 %    % Basophils 0.0 %    % Myelocytes 1.0 %    Absolute Neutrophil 10.7 (H) 1.6 - 8.3 10e9/L    Absolute Lymphocytes 1.1 0.8 - 5.3 10e9/L    Absolute Monocytes 0.0 0.0 - 1.3 10e9/L    Absolute Eosinophils 0.0 0.0 - 0.7 10e9/L    Absolute Basophils 0.0 0.0 - 0.2 10e9/L    Absolute Myelocytes 0.1 (H) 0 10e9/L    Hypochromasia Present    Comprehensive metabolic panel   Result Value Ref Range    Sodium 136 133 - 144 mmol/L    Potassium 3.6 3.4 - 5.3 mmol/L    Chloride 101 94 - 109 mmol/L    Carbon Dioxide 26 20 - 32 mmol/L    Anion Gap 9 3 - 14 mmol/L    Glucose 108 (H) 70 - 99 mg/dL    Urea Nitrogen 18 7 - 30 mg/dL    Creatinine 0.73 0.52 - 1.04 mg/dL    GFR Estimate 87 >60 mL/min/1.7m2    GFR Estimate If Black >90 >60 mL/min/1.7m2    Calcium 8.1 (L) 8.5 - 10.1 mg/dL    Bilirubin Total 0.3 0.2 - 1.3 mg/dL    Albumin 3.2 (L) 3.4 - 5.0 g/dL    Protein Total 6.0 (L) 6.8 - 8.8 g/dL    Alkaline Phosphatase 41 40 - 150 U/L    ALT 86 (H) 0 - 50 U/L    AST 38 0 - 45 U/L   Lactic acid whole blood   Result Value Ref Range    Lactic Acid 2.4 (H) 0.7 - 2.0 mmol/L   UA reflex to Microscopic and Culture   Result Value Ref Range    Color Urine Straw     Appearance Urine Clear     Glucose Urine  Negative NEG^Negative mg/dL    Bilirubin Urine Negative NEG^Negative    Ketones Urine Negative NEG^Negative mg/dL    Specific Gravity Urine 1.027 1.003 - 1.035    Blood Urine Negative NEG^Negative    pH Urine 6.0 5.0 - 7.0 pH    Protein Albumin Urine Negative NEG^Negative mg/dL    Urobilinogen mg/dL 0.0 0.0 - 2.0 mg/dL    Nitrite Urine Negative NEG^Negative    Leukocyte Esterase Urine Negative NEG^Negative    Source Midstream Urine    Influenza A/B antigen   Result Value Ref Range    Influenza A/B Agn Specimen Nasal     Influenza A Negative NEG^Negative    Influenza B Negative NEG^Negative   Blood culture   Result Value Ref Range    Specimen Description Blood Right Arm     Special Requests Aerobic and anaerobic bottles received     Culture Micro No growth after 3 hours    Blood culture   Result Value Ref Range    Specimen Description Blood Right Arm     Special Requests Aerobic and anaerobic bottles received     Culture Micro No growth after 3 hours             Attestation:  I have reviewed today's vital signs, notes, medications, labs and imaging.     Ollie Cuevas MD

## 2018-05-06 NOTE — PLAN OF CARE
"Problem: Patient Care Overview  Goal: Plan of Care/Patient Progress Review  [place] [facility] [place]    Patient admitted to room 2312 at approximately 0245 via cart from emergency room. Patient was accompanied by transport tech.     Verbal SBAR report received from TEJINDER mendez prior to patient arrival.     Patient ambulated to bed independently. Patient alert and oriented X 3. Pain is controlled with current analgesics.  Medication(s) being used: narcotic analgesics including morphine. 0-10 Pain Scale: 5. Admission vital signs: Blood pressure 110/73, pulse 88, temperature 99.6  F (37.6  C), temperature source Oral, resp. rate 16, height 1.6 m (5' 3\"), SpO2 98 %, not currently breastfeeding. Patient was oriented to plan of care, call light, bed controls, tv, telephone, bathroom and visiting hours.     Risk Assessment    The following safety risks were identified during admission: none. Yellow risk band applied: NO.     Skin Initial Assessment    This writer admitted this patient and completed a full skin assessment and Lv score in the Adult PCS flowsheet. Appropriate interventions initiated as needed.     Secondary skin check completed by TEJINDER davis.    Skin  Inspection of bony prominences: Full (dual skin check done)  Skin WDL:  WDL except, characteristics  Skin Temperature: warm  Skin Moisture: dry  Skin Integrity: scar(s)    Lv Risk Assessment  Sensory Perception: 4-->no impairment  Moisture: 4-->rarely moist  Activity: 3-->walks occasionally  Mobility: 4-->no limitation  Nutrition: 3-->adequate  Friction and Shear: 3-->no apparent problem  Lv Score: 21  Bed Support Surface: Atmos Air mattress  Reassessed using Bed Algorithm: Nevaeh Mirza        "

## 2018-05-06 NOTE — PLAN OF CARE
"Problem: Pain, Acute (Adult)  Goal: Identify Related Risk Factors and Signs and Symptoms  Related risk factors and signs and symptoms are identified upon initiation of Human Response Clinical Practice Guideline (CPG).   Pt crying, states \"aches all over\" & has abdominal pain. Gave morphine 2mg iv at 0515. Gave 2nd dose morphine 2mg iv at this time. Pt states nausea is better, taking sips of water.      "

## 2018-05-06 NOTE — PLAN OF CARE
Problem: Patient Care Overview  Goal: Plan of Care/Patient Progress Review  Outcome: Improving  Patient has been febrile all day, temperature max 103.5 axillary, 103 orally.  Patient was given tylenol.  Reports she is having a lot of abdominal pain and generalized aching all over.  Receiving as needed IV morphine and new order for oral dilaudid.  Patient has been sleeping throughout the day but awakens easily and reports continued pain.  Was nauseated this morning and given phenergan which was helpful.  She was able to eat her sandwich at lunch time.  Sepsis BPA was triggered by temperature and heart rate, lactic drawn and was elevated at 2.4.  MD notified and 500cc bolus has been given.  Patient has not voided all day and denies urge.

## 2018-05-06 NOTE — ED NOTES
Pt assessed for IV access. Pt reports she just discharged from [place]. Pt arms bruised significantly. Pt reports her veins are deeper and often require ultrasound. Charge nurse notified and was requested to attempt US IV.

## 2018-05-06 NOTE — PROGRESS NOTES
[facility]    Sepsis Evaluation Progress Note    Date of Service: 05/06/2018    I was called to see Doretha Fernandez due to abnormal vital signs triggering the Sepsis SIRS screening alert. She is known to have an infection. Likely a influenza infection but possibly a bacterial infection     Physical Exam    Vital Signs:  Temp: 102.2  F (39  C) Temp src: Oral BP: 128/75 Pulse: 101   Resp: 16 SpO2: 94 % O2 Device: None (Room air)      Lab:  Lactic Acid   Date Value Ref Range Status   05/05/2018 2.4 (H) 0.7 - 2.0 mmol/L Final     Comment:     Significant value called to and read back by  JOVON PASTOR RN 05/05/2018 2210 YW         The patient is at baseline mental status.    The rest of their physical exam is significant for tachycardia and fevers     Assessment and Plan    The SIRS and exam findings are likely due to   sepsis.     ID: The patient is currently on the following antibiotics:  Anti-infectives (Future)    Start     Dose/Rate Route Frequency Ordered Stop    05/06/18 1130  sulfamethoxazole-trimethoprim (BACTRIM/SEPTRA) 400-80 MG per tablet 1 tablet      1 tablet Oral DAILY 05/06/18 1115      05/06/18 0011  oseltamivir (TAMIFLU) capsule 75 mg      75 mg Oral 2 TIMES DAILY 05/06/18 0010          Current antibiotic coverage . Will order blood cultures and stat cbc- based on results may start antibiotics.      Fluid: Fluid bolus ordered.    Lab: Repeat lactic acid ordered for 2 hours from now.    Disposition: The patient will remain on the current unit. We will continue to monitor this patient closely.    Ollie Cuevas MD

## 2018-05-06 NOTE — PLAN OF CARE
Writer has reviewed initial/admission/transfer skin assessment, Lv assessment and agrees with documentation and assessment findings.  CHARLIE Palacios RN

## 2018-05-06 NOTE — ED PROVIDER NOTES
History     Chief Complaint   Patient presents with     Fever     cancer pt     HPI  Doretha Fernandez is a 43 year old female with a history of metastatic melanoma with secondary Nivolumab induced severe colitis which has been treated with high-dose steroids at [facility] ([facility] Hospital Service: [phone]) over the past week presenting for evaluation of fever and chills.  Patient's daughter was diagnosed with influenza B 4 days ago and started on Tamiflu.  Patient reports that tonight around 5 PM she developed severe chills and myalgias with a fever up to 101.7 at home.  Patient also reports associated abdominal cramping and constipation.  Last normal bowel movement was about 5 days ago.  She did have a small bowel movement today but is still had left lower abdominal pain radiating up her left side.  No nausea or vomiting.  Appetite has been decreased.    Problem List:    Patient Active Problem List    Diagnosis Date Noted     Influenza-like illness 05/06/2018     Priority: Medium     Other chronic pain 05/06/2018     Priority: Medium     Rash and nonspecific skin eruption 04/05/2018     Priority: Medium     Bilateral leg cramps 03/07/2018     Priority: Medium     Intestinal giardiasis 03/05/2018     Priority: Medium     Rectal bleeding 03/04/2018     Priority: Medium     Diarrhea 03/04/2018     Priority: Medium     Colitis 03/01/2018     Priority: Medium     Functional diarrhea 02/22/2018     Priority: Medium     Melanoma (H) 10/23/2017     Priority: Medium     Malignant melanoma of left upper extremity including shoulder (H) 10/12/2017     Priority: Medium     Metastatic malignant melanoma (H) 10/10/2017     Priority: Medium     Prothrombin mutation (H) 04/05/2017     Priority: Medium     On daily aspirin 81 mg per hematology's recommendations from 2008       Vision changes 04/01/2017     Priority: Medium     Mild intermittent asthma without complication 11/08/2013     Priority: Medium     Mild major  depression (H) 2013     Priority: Medium     Anxiety state 2012     Priority: Medium     Problem list name updated by automated process. Provider to review       Esophageal reflux 2012     Priority: Medium     DUB (dysfunctional uterine bleeding) 2011     Priority: Medium     CARDIOVASCULAR SCREENING; LDL GOAL LESS THAN 160 2011     Priority: Low        Past Medical History:    Past Medical History:   Diagnosis Date     Abnormal MRI      Anxiety      Basal cell carcinoma      Depression      Lumbago      Malignant melanoma (H)      Mild persistent asthma      Prothrombin deficiency (H)      Stroke (cerebrum) (H)      TIA (transient ischemic attack)        Past Surgical History:    Past Surgical History:   Procedure Laterality Date     COLONOSCOPY N/A 10/18/2017    Procedure: COLONOSCOPY;  Colon;  Surgeon: Debbie Stephens MD;  Location: [facility] OR     COLONOSCOPY N/A 3/9/2018    Procedure: COMBINED COLONOSCOPY, SINGLE OR MULTIPLE BIOPSY/POLYPECTOMY BY BIOPSY;  colon  ;  Surgeon: Benita Schumacher MD;  Location: [facility] GI     DISSECT LYMPH NODE AXILLA Left 10/23/2017    Procedure: DISSECT LYMPH NODE AXILLA;  Left Axillary Lymph Node Dissection ;  Surgeon: Laurent Cool MD;  Location: [facility] OR     GYN SURGERY           REPAIR MOHS Left 2017    Procedure: REPAIR MOHS;  Left Upper Lid Moh's Reconstruction;  Surgeon: Kisha Bosch MD;  Location: [facility] OR       Family History:    Family History   Problem Relation Age of Onset     CANCER Mother 45     lung     Neurologic Disorder Mother      Lipids Father      GASTROINTESTINAL DISEASE Father      Depression Father      CANCER Maternal Grandmother      Blood Disease Maternal Grandmother      Arthritis Maternal Grandmother      DIABETES Maternal Grandmother      Depression Maternal Grandmother      Macular Degeneration Maternal Grandmother      Glaucoma Maternal Grandmother      DIABETES Maternal Grandfather      CEREBROVASCULAR  "DISEASE Maternal Grandfather      Blood Disease Maternal Grandfather      HEART DISEASE Maternal Grandfather      Glaucoma Maternal Grandfather      CANCER Paternal Grandmother      Cancer - colorectal Paternal Grandmother      Respiratory Paternal Grandfather      Blood Disease Paternal Grandfather      HEART DISEASE Daughter      Asthma Daughter      Depression Sister        Social History:  Marital Status:   [4]  Social History   Substance Use Topics     Smoking status: Passive Smoke Exposure - Never Smoker     Last attempt to quit: 3/20/1998     Smokeless tobacco: Never Used     Alcohol use No      Comment: occ        Medications:      No current outpatient prescriptions on file.      Review of Systems   Constitutional: Positive for appetite change, chills, fatigue and fever.   HENT: Negative for congestion.    Respiratory: Negative for cough, chest tightness and shortness of breath.    Cardiovascular: Negative for chest pain.   Gastrointestinal: Positive for abdominal pain, constipation and nausea. Negative for blood in stool (none recently since solumedrol infusions), diarrhea and vomiting.   Genitourinary: Negative for dysuria.   Musculoskeletal: Positive for myalgias. Negative for back pain.   Skin: Negative for rash.   Neurological: Negative for weakness, light-headedness, numbness and headaches.   All other systems reviewed and are negative.      Physical Exam   BP: 107/70  Pulse: 117  Temp: 99.6  F (37.6  C)  Resp: 18  Height: 160 cm (5' 3\")  SpO2: 98 %      Physical Exam   Constitutional: She is oriented to person, place, and time. She appears well-developed and well-nourished.   Uncomfortable and anxious appearing   HENT:   Head: Normocephalic and atraumatic.   Mouth/Throat: Oropharynx is clear and moist.   Eyes: Conjunctivae are normal.   Cardiovascular: Normal rate and regular rhythm.    Pulmonary/Chest: Effort normal.   Abdominal: Soft. Bowel sounds are normal. She exhibits distension. There " is tenderness (mild diffuse). There is no rebound and no guarding.   Musculoskeletal: Normal range of motion.   Neurological: She is alert and oriented to person, place, and time.   Skin: Skin is warm and dry.   Psychiatric: She has a normal mood and affect.   Nursing note and vitals reviewed.      ED Course     ED Course     Procedures            Lactate is greater than 1.9 due to influenza like illness however, at this time there is no sign of severe sepsis or septic shock.         Results for orders placed or performed during the hospital encounter of 05/05/18 (from the past 24 hour(s))   Lactic acid level STAT for sepsis protocol   Result Value Ref Range    Lactate for Sepsis Protocol 2.4 (HH) 0.4 - 1.9 mmol/L   Blood culture   Result Value Ref Range    Specimen Description Blood Right Hand     Special Requests Aerobic and anaerobic bottles received     Culture Micro No growth after 4 hours    Lactic acid whole blood   Result Value Ref Range    Lactic Acid 2.4 (H) 0.7 - 2.0 mmol/L   CBC with platelets differential   Result Value Ref Range    WBC 9.2 4.0 - 11.0 10e9/L    RBC Count 4.53 3.8 - 5.2 10e12/L    Hemoglobin 12.9 11.7 - 15.7 g/dL    Hematocrit 39.5 35.0 - 47.0 %    MCV 87 78 - 100 fl    MCH 28.5 26.5 - 33.0 pg    MCHC 32.7 31.5 - 36.5 g/dL    RDW 15.0 10.0 - 15.0 %    Platelet Count 130 (L) 150 - 450 10e9/L    Diff Method Manual Differential     % Neutrophils 68.0 %    % Lymphocytes 22.0 %    % Monocytes 1.0 %    % Eosinophils 0.0 %    % Basophils 0.0 %    % Metamyelocytes 7.0 %    % Myelocytes 2.0 %    Absolute Neutrophil 6.3 1.6 - 8.3 10e9/L    Absolute Lymphocytes 2.0 0.8 - 5.3 10e9/L    Absolute Monocytes 0.1 0.0 - 1.3 10e9/L    Absolute Eosinophils 0.0 0.0 - 0.7 10e9/L    Absolute Basophils 0.0 0.0 - 0.2 10e9/L    Absolute Metamyelocytes 0.6 (H) 0 10e9/L    Absolute Myelocytes 0.2 (H) 0 10e9/L    RBC Morphology Normal     Platelet Estimate       Automated count confirmed.  Platelet morphology is  normal.   Procalcitonin   Result Value Ref Range    Procalcitonin 1.51 ng/ml   Blood culture   Result Value Ref Range    Specimen Description Blood Right Hand     Special Requests Aerobic and anaerobic bottles received     Culture Micro No growth after 4 hours    Lactic acid whole blood   Result Value Ref Range    Lactic Acid 2.7 (H) 0.7 - 2.0 mmol/L   Chest 2 Views*    Narrative    XR CHEST 2 VW   5/6/2018 9:54 PM     HISTORY: febrile;     COMPARISON: None.    FINDINGS: The heart is negative.  There is a patchy area of  consolidation in the left retrocardiac region. This would be  consistent with a small area of pneumonia. Surgical clips are seen in  the left axillary region.. The pulmonary vasculature is normal.  The  bones and soft tissues are unremarkable.      Impression    IMPRESSION: Small retrocardiac infiltrate, suspect pneumonia.        JD HAYES MD   Lactic acid whole blood   Result Value Ref Range    Lactic Acid 1.2 0.7 - 2.0 mmol/L       Medications   morphine (PF) injection 2-4 mg (2 mg Intravenous Given 5/6/18 0008)   oseltamivir (TAMIFLU) capsule 75 mg (75 mg Oral Given 5/6/18 1720)   naloxone (NARCAN) injection 0.1-0.4 mg (not administered)   ibuprofen (ADVIL/MOTRIN) tablet 600 mg (not administered)   morphine (PF) injection 2-4 mg (4 mg Intravenous Given 5/6/18 1804)   ondansetron (ZOFRAN-ODT) ODT tab 4 mg ( Oral See Alternative 5/6/18 0409)     Or   ondansetron (ZOFRAN) injection 4 mg (4 mg Intravenous Given 5/6/18 0409)   prochlorperazine (COMPAZINE) injection 10 mg (not administered)     Or   prochlorperazine (COMPAZINE) tablet 10 mg (not administered)     Or   prochlorperazine (COMPAZINE) Suppository 25 mg (not administered)   promethazine (PHENERGAN) IV injection 25 mg (25 mg Intravenous Given 5/6/18 0959)   acetaminophen (TYLENOL) tablet 1,000 mg (1,000 mg Oral Given 5/6/18 1542)   hydrocortisone (ANUSOL-HC) Suppository 25 mg (25 mg Rectal Not Given 5/6/18 1932)   HYDROmorphone (DILAUDID)  tablet 4-6 mg (4 mg Oral Given 5/6/18 1938)   LORazepam (ATIVAN) tablet 0.5-1 mg (1 mg Oral Given 5/6/18 1938)   mesalamine (LIALDA) EC tablet 4,800 mg (4,800 mg Oral Given 5/6/18 1205)   pramoxine (PROCTOFOAM) 1 % foam ( Rectal Not Given 5/6/18 1932)   predniSONE (DELTASONE) tablet 60 mg (60 mg Oral Given 5/6/18 1205)   promethazine (PHENERGAN) tablet 25 mg (not administered)   rOPINIRole (REQUIP) tablet 0.25 mg ( Oral Canceled Entry 5/6/18 2200)   sulfamethoxazole-trimethoprim (BACTRIM/SEPTRA) 400-80 MG per tablet 1 tablet (1 tablet Oral Given 5/6/18 1206)   naloxone (NARCAN) injection 0.1-0.4 mg (not administered)   melatonin tablet 1 mg (not administered)   venlafaxine (EFFEXOR-ER) 24 hr tablet 150 mg (150 mg Oral Given 5/6/18 1247)   polyethylene glycol (MIRALAX/GLYCOLAX) Packet 17 g (not administered)   cefTRIAXone IN D5W (ROCEPHIN) intermittent infusion 2 g (2 g Intravenous New Bag 5/7/18 0123)   sodium chloride 0.9% infusion ( Intravenous New Bag 5/7/18 0309)   doxycycline (VIBRAMYCIN) capsule 100 mg (100 mg Oral Given 5/7/18 0308)   lactated ringers BOLUS 1,000 mL (1,000 mLs Intravenous New Bag 5/5/18 2204)   ketorolac (TORADOL) injection 15 mg (15 mg Intravenous Given 5/5/18 2215)   iopamidol (ISOVUE-370) solution 88 mL (88 mLs Intravenous Given 5/5/18 2248)   sodium chloride 0.9 % bag 500mL for CT scan flush use (62 mLs Intravenous Given 5/5/18 2247)   acetaminophen (TYLENOL) tablet 1,000 mg (1,000 mg Oral Given 5/6/18 0121)   lactated ringers BOLUS 1,000 mL (0 mLs Intravenous ED Infusing on Admission/transfer 5/6/18 0234)   0.9% sodium chloride BOLUS (500 mLs Intravenous New Bag 5/6/18 1405)   lactated ringers BOLUS 1,000 mL (1,000 mLs Intravenous New Bag 5/6/18 2056)     12:58 AM; PT re-assessed. Feeling very chilled. Tylenol ordered.    1:13 AM: Discussed with Lorena. Accepts for admission. Pt confirms she took her steroids today.    Assessments & Plan (with Medical Decision Making)  43-year-old female  with history of metastatic melanoma with secondary chemotherapy induced colitis currently being treated with high-dose steroids presenting for evaluation of fever, chills, myalgias and a known sick contact with influenza B.  Rapid flu swab here negative.  Blood cultures and septic workup obtained given patient's fever with presumed immunosuppression given her high-dose steroid therapy recently.  Patient did have a mild leukocytosis with left shift which could be infectious related or steroid-induced.  Lactic acid mildly elevated at 2.4.  Blood cultures drawn and pending however potentially of limited value as she is on Bactrim therapy prophylactically.  Patient treated symptomatically but continued to have significant myalgias and fatigue.  Recommended admission for symptom control.  She was started empirically on Tamiflu given her influenza-like illness despite rapid swab being negative given its poor sensitivity and known influenza exposure in an immunocompromised patient.         I have reviewed the nursing notes.    I have reviewed the findings, diagnosis, plan and need for follow up with the patient.       Current Discharge Medication List          Final diagnoses:   Influenza-like illness   Immunosuppressed status (H)   Constipation, unspecified constipation type       5/5/2018   [facility] EMERGENCY DEPARTMENT     Joseph, Alex You MD  05/07/18 [id number]

## 2018-05-06 NOTE — ED NOTES
C/o fever, body aches that started suddenly today. Has had colitis for the past few months. Exposed to influenza from daughter. Pt had injections into abd yesterday to help with inflammation of abd from GI tract. Has had steroid infusions this past week. Had chemo in February, was told it stays in her system for 6 months. Had a UTI and constipation recently. States has gained 10 pounds the past week from the steroids. Took APAP at 1700.

## 2018-05-07 LAB
ANION GAP SERPL CALCULATED.3IONS-SCNC: 6 MMOL/L (ref 3–14)
BASOPHILS # BLD AUTO: 0 10E9/L (ref 0–0.2)
BASOPHILS NFR BLD AUTO: 0 %
BUN SERPL-MCNC: 8 MG/DL (ref 7–30)
CALCIUM SERPL-MCNC: 7.7 MG/DL (ref 8.5–10.1)
CHLORIDE SERPL-SCNC: 104 MMOL/L (ref 94–109)
CO2 SERPL-SCNC: 27 MMOL/L (ref 20–32)
CREAT SERPL-MCNC: 0.57 MG/DL (ref 0.52–1.04)
DIFFERENTIAL METHOD BLD: ABNORMAL
EOSINOPHIL # BLD AUTO: 0 10E9/L (ref 0–0.7)
EOSINOPHIL NFR BLD AUTO: 0 %
ERYTHROCYTE [DISTWIDTH] IN BLOOD BY AUTOMATED COUNT: 15 % (ref 10–15)
FLUAV H1 2009 PAND RNA SPEC QL NAA+PROBE: NEGATIVE
FLUAV H1 RNA SPEC QL NAA+PROBE: NEGATIVE
FLUAV H3 RNA SPEC QL NAA+PROBE: NEGATIVE
FLUAV RNA SPEC QL NAA+PROBE: NEGATIVE
FLUBV RNA SPEC QL NAA+PROBE: NEGATIVE
GFR SERPL CREATININE-BSD FRML MDRD: >90 ML/MIN/1.7M2
GLUCOSE SERPL-MCNC: 152 MG/DL (ref 70–99)
HADV DNA SPEC QL NAA+PROBE: NEGATIVE
HADV DNA SPEC QL NAA+PROBE: NEGATIVE
HCT VFR BLD AUTO: 37.7 % (ref 35–47)
HGB BLD-MCNC: 12.2 G/DL (ref 11.7–15.7)
HMPV RNA SPEC QL NAA+PROBE: NEGATIVE
HPIV1 RNA SPEC QL NAA+PROBE: NEGATIVE
HPIV2 RNA SPEC QL NAA+PROBE: NEGATIVE
HPIV3 RNA SPEC QL NAA+PROBE: NEGATIVE
LACTATE BLD-SCNC: 1.2 MMOL/L (ref 0.7–2)
LACTATE BLD-SCNC: 1.3 MMOL/L (ref 0.7–2)
LYMPHOCYTES # BLD AUTO: 0.5 10E9/L (ref 0.8–5.3)
LYMPHOCYTES NFR BLD AUTO: 7 %
MCH RBC QN AUTO: 28.4 PG (ref 26.5–33)
MCHC RBC AUTO-ENTMCNC: 32.4 G/DL (ref 31.5–36.5)
MCV RBC AUTO: 88 FL (ref 78–100)
METAMYELOCYTES # BLD: 0.2 10E9/L
METAMYELOCYTES NFR BLD MANUAL: 2 %
MICROBIOLOGIST REVIEW: NORMAL
MONOCYTES # BLD AUTO: 0.5 10E9/L (ref 0–1.3)
MONOCYTES NFR BLD AUTO: 7 %
MYELOCYTES # BLD: 0.5 10E9/L
MYELOCYTES NFR BLD MANUAL: 7 %
NEUTROPHILS # BLD AUTO: 5.8 10E9/L (ref 1.6–8.3)
NEUTROPHILS NFR BLD AUTO: 75 %
NEUTS BAND # BLD AUTO: 0.2 10E9/L (ref 0–0.6)
NEUTS BAND NFR BLD MANUAL: 2 %
PLATELET # BLD AUTO: 85 10E9/L (ref 150–450)
PLATELET # BLD EST: ABNORMAL 10*3/UL
POTASSIUM SERPL-SCNC: 4.1 MMOL/L (ref 3.4–5.3)
PROCALCITONIN SERPL-MCNC: 0.58 NG/ML
RBC # BLD AUTO: 4.29 10E12/L (ref 3.8–5.2)
RBC MORPH BLD: ABNORMAL
RHINOVIRUS RNA SPEC QL NAA+PROBE: NEGATIVE
RSV RNA SPEC QL NAA+PROBE: NEGATIVE
RSV RNA SPEC QL NAA+PROBE: NEGATIVE
SODIUM SERPL-SCNC: 137 MMOL/L (ref 133–144)
SPECIMEN SOURCE: NORMAL
WBC # BLD AUTO: 7.7 10E9/L (ref 4–11)

## 2018-05-07 PROCEDURE — 25000132 ZZH RX MED GY IP 250 OP 250 PS 637: Performed by: FAMILY MEDICINE

## 2018-05-07 PROCEDURE — 25000132 ZZH RX MED GY IP 250 OP 250 PS 637: Performed by: PHYSICIAN ASSISTANT

## 2018-05-07 PROCEDURE — 25000131 ZZH RX MED GY IP 250 OP 636 PS 637: Performed by: FAMILY MEDICINE

## 2018-05-07 PROCEDURE — 25000125 ZZHC RX 250: Performed by: FAMILY MEDICINE

## 2018-05-07 PROCEDURE — 25000132 ZZH RX MED GY IP 250 OP 250 PS 637: Performed by: EMERGENCY MEDICINE

## 2018-05-07 PROCEDURE — 12000000 ZZH R&B MED SURG/OB

## 2018-05-07 PROCEDURE — 36415 COLL VENOUS BLD VENIPUNCTURE: CPT | Performed by: PHYSICIAN ASSISTANT

## 2018-05-07 PROCEDURE — 85025 COMPLETE CBC W/AUTO DIFF WBC: CPT | Performed by: FAMILY MEDICINE

## 2018-05-07 PROCEDURE — 99233 SBSQ HOSP IP/OBS HIGH 50: CPT | Performed by: FAMILY MEDICINE

## 2018-05-07 PROCEDURE — 84145 PROCALCITONIN (PCT): CPT | Performed by: FAMILY MEDICINE

## 2018-05-07 PROCEDURE — 25000128 H RX IP 250 OP 636: Performed by: PHYSICIAN ASSISTANT

## 2018-05-07 PROCEDURE — 83605 ASSAY OF LACTIC ACID: CPT | Performed by: FAMILY MEDICINE

## 2018-05-07 PROCEDURE — 36415 COLL VENOUS BLD VENIPUNCTURE: CPT | Performed by: FAMILY MEDICINE

## 2018-05-07 PROCEDURE — 83605 ASSAY OF LACTIC ACID: CPT | Performed by: PHYSICIAN ASSISTANT

## 2018-05-07 PROCEDURE — 25000128 H RX IP 250 OP 636: Performed by: FAMILY MEDICINE

## 2018-05-07 PROCEDURE — 80048 BASIC METABOLIC PNL TOTAL CA: CPT | Performed by: FAMILY MEDICINE

## 2018-05-07 RX ORDER — CEFTRIAXONE SODIUM 2 G/50ML
2 INJECTION, SOLUTION INTRAVENOUS EVERY 24 HOURS
Status: DISCONTINUED | OUTPATIENT
Start: 2018-05-07 | End: 2018-05-09 | Stop reason: HOSPADM

## 2018-05-07 RX ORDER — DIPHENHYDRAMINE HYDROCHLORIDE 50 MG/ML
25 INJECTION INTRAMUSCULAR; INTRAVENOUS EVERY 6 HOURS PRN
Status: DISCONTINUED | OUTPATIENT
Start: 2018-05-07 | End: 2018-05-09 | Stop reason: HOSPADM

## 2018-05-07 RX ORDER — SODIUM CHLORIDE 9 MG/ML
INJECTION, SOLUTION INTRAVENOUS CONTINUOUS
Status: DISCONTINUED | OUTPATIENT
Start: 2018-05-07 | End: 2018-05-07

## 2018-05-07 RX ORDER — DOXYCYCLINE 100 MG/1
100 CAPSULE ORAL EVERY 12 HOURS SCHEDULED
Status: DISCONTINUED | OUTPATIENT
Start: 2018-05-07 | End: 2018-05-09 | Stop reason: HOSPADM

## 2018-05-07 RX ORDER — DOXYCYCLINE 100 MG/1
100 CAPSULE ORAL EVERY 12 HOURS SCHEDULED
Status: DISCONTINUED | OUTPATIENT
Start: 2018-05-07 | End: 2018-05-07 | Stop reason: CLARIF

## 2018-05-07 RX ORDER — AMOXICILLIN 250 MG
2 CAPSULE ORAL 2 TIMES DAILY PRN
Status: DISCONTINUED | OUTPATIENT
Start: 2018-05-07 | End: 2018-05-09 | Stop reason: HOSPADM

## 2018-05-07 RX ORDER — DIPHENHYDRAMINE HCL 25 MG
25 CAPSULE ORAL EVERY 6 HOURS PRN
Status: DISCONTINUED | OUTPATIENT
Start: 2018-05-07 | End: 2018-05-09 | Stop reason: HOSPADM

## 2018-05-07 RX ORDER — BISACODYL 10 MG
10 SUPPOSITORY, RECTAL RECTAL DAILY PRN
Status: DISCONTINUED | OUTPATIENT
Start: 2018-05-07 | End: 2018-05-09 | Stop reason: HOSPADM

## 2018-05-07 RX ORDER — AMOXICILLIN 250 MG
1 CAPSULE ORAL 2 TIMES DAILY PRN
Status: DISCONTINUED | OUTPATIENT
Start: 2018-05-07 | End: 2018-05-09 | Stop reason: HOSPADM

## 2018-05-07 RX ADMIN — HYDROMORPHONE HYDROCHLORIDE 4 MG: 2 TABLET ORAL at 09:32

## 2018-05-07 RX ADMIN — ACETAMINOPHEN 1000 MG: 500 TABLET ORAL at 17:17

## 2018-05-07 RX ADMIN — DOXYCYCLINE 100 MG: 100 CAPSULE ORAL at 21:11

## 2018-05-07 RX ADMIN — VENLAFAXINE HYDROCHLORIDE 150 MG: 150 TABLET, EXTENDED RELEASE ORAL at 08:20

## 2018-05-07 RX ADMIN — MESALAMINE 4800 MG: 1.2 TABLET, DELAYED RELEASE ORAL at 08:24

## 2018-05-07 RX ADMIN — SODIUM CHLORIDE: 9 INJECTION, SOLUTION INTRAVENOUS at 03:09

## 2018-05-07 RX ADMIN — OSELTAMIVIR PHOSPHATE 75 MG: 75 CAPSULE ORAL at 08:20

## 2018-05-07 RX ADMIN — SODIUM CHLORIDE: 9 INJECTION, SOLUTION INTRAVENOUS at 14:31

## 2018-05-07 RX ADMIN — MAGNESIUM HYDROXIDE 30 ML: 400 SUSPENSION ORAL at 19:01

## 2018-05-07 RX ADMIN — SENNOSIDES AND DOCUSATE SODIUM 2 TABLET: 8.6; 5 TABLET ORAL at 12:27

## 2018-05-07 RX ADMIN — HYDROMORPHONE HYDROCHLORIDE 4 MG: 2 TABLET ORAL at 12:27

## 2018-05-07 RX ADMIN — HYDROMORPHONE HYDROCHLORIDE 4 MG: 2 TABLET ORAL at 21:53

## 2018-05-07 RX ADMIN — LORAZEPAM 1 MG: 0.5 TABLET ORAL at 21:49

## 2018-05-07 RX ADMIN — HYDROMORPHONE HYDROCHLORIDE 4 MG: 2 TABLET ORAL at 15:44

## 2018-05-07 RX ADMIN — SULFAMETHOXAZOLE AND TRIMETHOPRIM 1 TABLET: 400; 80 TABLET ORAL at 08:24

## 2018-05-07 RX ADMIN — HYDROMORPHONE HYDROCHLORIDE 4 MG: 2 TABLET ORAL at 19:01

## 2018-05-07 RX ADMIN — HYDROMORPHONE HYDROCHLORIDE 4 MG: 2 TABLET ORAL at 06:34

## 2018-05-07 RX ADMIN — DOXYCYCLINE 100 MG: 100 CAPSULE ORAL at 03:08

## 2018-05-07 RX ADMIN — SODIUM CHLORIDE: 9 INJECTION, SOLUTION INTRAVENOUS at 06:30

## 2018-05-07 RX ADMIN — ROPINIROLE HYDROCHLORIDE 0.25 MG: 0.25 TABLET, FILM COATED ORAL at 21:11

## 2018-05-07 RX ADMIN — DIPHENHYDRAMINE HYDROCHLORIDE 25 MG: 50 INJECTION, SOLUTION INTRAMUSCULAR; INTRAVENOUS at 13:38

## 2018-05-07 RX ADMIN — CEFTRIAXONE SODIUM 2 G: 2 INJECTION, SOLUTION INTRAVENOUS at 01:23

## 2018-05-07 RX ADMIN — DOXYCYCLINE 100 MG: 100 CAPSULE ORAL at 08:20

## 2018-05-07 RX ADMIN — PREDNISONE 60 MG: 50 TABLET ORAL at 08:21

## 2018-05-07 RX ADMIN — OSELTAMIVIR PHOSPHATE 75 MG: 75 CAPSULE ORAL at 17:17

## 2018-05-07 RX ADMIN — POLYETHYLENE GLYCOL 3350 17 G: 17 POWDER, FOR SOLUTION ORAL at 08:29

## 2018-05-07 NOTE — CONSULTS
Care Transition Initial Assessment - RN  Reason For Consult: discharge planning   Met with: Patient.    DATA   Principal Problem:    Influenza-like illness  Active Problems:    Mild major depression (H)    Metastatic malignant melanoma (H)    Colitis    Other chronic pain       Primary Care Clinic Name: [facility]  Primary Care MD Name: Dr. Naidu  Contact information and PCP information verified: Yes    ASSESSMENT  Cognitive Status: awake, alert and oriented.       Lives With: child(gold), dependent     Description of Support System: Supportive, Involved   Who is your support system?: Parent(s)   Support Assessment: Adequate family and caregiver support   Insurance Concerns: No Insurance issues identified      This writer met with pt, introduced self and role.  The patient lives with her (15 yo and 10 yo) children.  Her Father will be staying with her upon discharge if she needs him to.  Patient receives care for colitis at [place].  Currently she receives treatment for metastatic melanoma at [facility], Dr. Richards.  Per patient she will be finishing her cancer treatment at [place].  There are no CTS discharge needs.     As a system [facility] wants to ensure that across the care continuum that you have support through care coordination services that include nurses and social workers in the outpatient setting.  Due to your multiple providers I feel it is important you have this support when you discharge.  They will be calling you within 24-48 hours of your discharge.   A brochure describing the services was provided.  If you have questions you can reach out to your clinic directly and ask for the Care Coordinator assigned to your care.      PLAN    Home      Discharge Planner   Discharge Plans in progress: Home  Barriers to discharge plan: Medical stability  Follow up plan: Follow up with PCP       Entered by: Stella Pathak 05/07/2018 11:10 AM           Stella Pathak RN, Care  Coordinator [phone]

## 2018-05-07 NOTE — PROGRESS NOTES
[facility] Hospitalist Progress Note           Assessment and Plan:     Sepsis due to influenza like illness vs bacterial pneumonia as below  5/7/2018 -- improving.  Lactic normalized.  Treat infection as below.      Influenza-like illness  5/6/18 -- Patient here with influenza like symptoms.  Initial screening for influenza was negative. Will get PCR. She is on Tamiflu.  5/7/2018 -- flu PCR pending, continue tamiflu.       Possible Community Acquired Pneumonia  5/6/18 -- CXR shows small retrocardiac infiltrate with concern for possible pneumonia. Possible that this is viral given known influenza exposure though, concern for bacterial given patient is immunocompromised on chemotherapy and chronic steroids.  - initiate ceftriaxone and doxycycline (patient has not tolerated azithromycin or levofloxacin in the past)  5/7/2018 -- improving, continue antibiotics.  Follow.        Mild major depression (H)/anxiety -unspecified   5/6/18 -- No new symptoms -  Continue at home medication   5/7/2018 -- mood and affect stable.  No change.         Metastatic malignant melanoma (H)  5/6/18 -- Sees oncology, Continue outpatient treatment      Colitis - unspecified inflammatory colitis, now with constipation  5/6/18 -- Was getting infusions at [place] and this has helped   5/7/2018 -- has been on prednisone 60 mg daily which has been continued for now.  Gets intermittent constipation with this.  Was planning to start on cortifoam but awaiting prior authorization and says the medication will not be available until this fall.  Not using any suppositories or rectal creams at present.    Has plan to taper off prednisone (has prescription) and for follow-up with her GI providers as an outpatient.  Is on prophylactic bactrim at baseline.  Started on as needed medications for constipation here.        Other chronic pain  5/7/2018 -- on oral dialudid at home although says she hasn't been using it recently.  Continued here for generalized  achiness.          GERD  5/7/2018 -- not on any medications.       Prophylaxis  Mechanical given low platelets.      Lines  PIV    Disposition  Anticipate 1-2 more days inpatient if continues to improve.              Interval History:   Slowly improving.  Still achy all over but a bit less so.  No fever or chills which is at least better than yesterday.  No dyspnea.  No new concerns.   No bowel movement past few days, only 1 past week.  She's very happy with this from having had 10 per day prior to her injections last week at [facility], but now feels constipated.  Abdominal pain/soreness at baseline.              Review of Systems:    ROS: 10 point ROS neg other than the symptoms noted above in the HPI.             Medications:   Current active medications and PTA medications reviewed, see medication list for details.            Physical Exam:   Vitals were reviewed  Patient Vitals for the past 24 hrs:   BP Temp Temp src Pulse Resp SpO2 Weight   05/07/18 0803 102/72 98  F (36.7  C) Oral 67 18 94 % -   05/07/18 0630 - - - - - 96 % -   05/07/18 0527 114/68 97.9  F (36.6  C) Oral 75 16 96 % 98.3 kg (216 lb 11.4 oz)   05/07/18 0307 106/66 - - 72 15 95 % -   05/07/18 0010 - - - - - 93 % -   05/07/18 0000 - - - - - 93 % -   05/06/18 2338 - - - - - 93 % -   05/06/18 2337 98/57 96.9  F (36.1  C) Oral 75 19 90 % -   05/06/18 2115 106/54 - - 78 18 - -   05/06/18 2049 115/58 97.8  F (36.6  C) Oral 75 18 96 % -   05/06/18 1800 - 98.8  F (37.1  C) Oral - - - -   05/06/18 1651 96/62 - - 104 18 - -   05/06/18 1639 - 100.2  F (37.9  C) Oral - - - -   05/06/18 1555 - 99.9  F (37.7  C) Oral - - - -   05/06/18 1441 - 101.6  F (38.7  C) Oral - - - -   05/06/18 1328 128/75 - - 101 16 94 % -   05/06/18 1326 - 102.2  F (39  C) Oral - - - -   05/06/18 1325 - 102.4  F (39.1  C) Axillary - - - -   05/06/18 1247 - 100.8  F (38.2  C) Oral - - - -   05/06/18 1150 112/69 102.4  F (39.1  C) Oral 102 16 94 % -       Temperatures:  Current - Temp: 98  F  (36.7  C); Max - Temp  Av.9  F (37.7  C)  Min: 96.9  F (36.1  C)  Max: 102.4  F (39.1  C)  Respiration range: Resp  Av.1  Min: 15  Max: 19  Pulse range: Pulse  Av.2  Min: 67  Max: 104  Blood pressure range: Systolic (24hrs), Av , Min:96 , Max:128   ; Diastolic (24hrs), Av, Min:54, Max:75    Pulse oximetry range: SpO2  Av %  Min: 90 %  Max: 96 %  I/O last 3 completed shifts:  In: 2848 [P.O.:340; I.V.:2508]  Out: 4300 [Urine:4300]    Intake/Output Summary (Last 24 hours) at 18 1127  Last data filed at 18 1053   Gross per 24 hour   Intake             3428 ml   Output             5700 ml   Net            -2272 ml     EXAM:  General: awake and alert, NAD, oriented x 3  Head: normocephalic  Neck: unremarkable, no lymphadenopathy   HEENT: oropharynx pink and moist    Heart: Regular rate and rhythm, no murmurs, rubs, or gallops  Lungs: clear to auscultation bilaterally with good air movement throughout  Abdomen: soft, mildly tender throughout which is baseline per patient, no guarding or rebound, no masses or organomegaly, bowel sounds present  Extremities: no edema in lower extremities   Skin unremarkable.               Data:     Results for orders placed or performed during the hospital encounter of 18 (from the past 24 hour(s))   Lactic acid level STAT for sepsis protocol   Result Value Ref Range    Lactate for Sepsis Protocol 2.4 (HH) 0.4 - 1.9 mmol/L   Blood culture   Result Value Ref Range    Specimen Description Blood Right Hand     Special Requests Aerobic and anaerobic bottles received     Culture Micro No growth after 12 hours    Lactic acid whole blood   Result Value Ref Range    Lactic Acid 2.4 (H) 0.7 - 2.0 mmol/L   CBC with platelets differential   Result Value Ref Range    WBC 9.2 4.0 - 11.0 10e9/L    RBC Count 4.53 3.8 - 5.2 10e12/L    Hemoglobin 12.9 11.7 - 15.7 g/dL    Hematocrit 39.5 35.0 - 47.0 %    MCV 87 78 - 100 fl    MCH 28.5 26.5 - 33.0 pg    MCHC 32.7  31.5 - 36.5 g/dL    RDW 15.0 10.0 - 15.0 %    Platelet Count 130 (L) 150 - 450 10e9/L    Diff Method Manual Differential     % Neutrophils 68.0 %    % Lymphocytes 22.0 %    % Monocytes 1.0 %    % Eosinophils 0.0 %    % Basophils 0.0 %    % Metamyelocytes 7.0 %    % Myelocytes 2.0 %    Absolute Neutrophil 6.3 1.6 - 8.3 10e9/L    Absolute Lymphocytes 2.0 0.8 - 5.3 10e9/L    Absolute Monocytes 0.1 0.0 - 1.3 10e9/L    Absolute Eosinophils 0.0 0.0 - 0.7 10e9/L    Absolute Basophils 0.0 0.0 - 0.2 10e9/L    Absolute Metamyelocytes 0.6 (H) 0 10e9/L    Absolute Myelocytes 0.2 (H) 0 10e9/L    RBC Morphology Normal     Platelet Estimate       Automated count confirmed.  Platelet morphology is normal.   Procalcitonin   Result Value Ref Range    Procalcitonin 1.51 ng/ml   Blood culture   Result Value Ref Range    Specimen Description Blood Right Hand     Special Requests Aerobic and anaerobic bottles received     Culture Micro No growth after 12 hours    Lactic acid whole blood   Result Value Ref Range    Lactic Acid 2.7 (H) 0.7 - 2.0 mmol/L   Chest 2 Views*    Narrative    XR CHEST 2 VW   5/6/2018 9:54 PM     HISTORY: febrile;     COMPARISON: None.    FINDINGS: The heart is negative.  There is a patchy area of  consolidation in the left retrocardiac region. This would be  consistent with a small area of pneumonia. Surgical clips are seen in  the left axillary region.. The pulmonary vasculature is normal.  The  bones and soft tissues are unremarkable.      Impression    IMPRESSION: Small retrocardiac infiltrate, suspect pneumonia.        JD HAYES MD   Lactic acid whole blood   Result Value Ref Range    Lactic Acid 1.2 0.7 - 2.0 mmol/L   Basic metabolic panel   Result Value Ref Range    Sodium 137 133 - 144 mmol/L    Potassium 4.1 3.4 - 5.3 mmol/L    Chloride 104 94 - 109 mmol/L    Carbon Dioxide 27 20 - 32 mmol/L    Anion Gap 6 3 - 14 mmol/L    Glucose 152 (H) 70 - 99 mg/dL    Urea Nitrogen 8 7 - 30 mg/dL    Creatinine 0.57  0.52 - 1.04 mg/dL    GFR Estimate >90 >60 mL/min/1.7m2    GFR Estimate If Black >90 >60 mL/min/1.7m2    Calcium 7.7 (L) 8.5 - 10.1 mg/dL   CBC with platelets differential   Result Value Ref Range    WBC 7.7 4.0 - 11.0 10e9/L    RBC Count 4.29 3.8 - 5.2 10e12/L    Hemoglobin 12.2 11.7 - 15.7 g/dL    Hematocrit 37.7 35.0 - 47.0 %    MCV 88 78 - 100 fl    MCH 28.4 26.5 - 33.0 pg    MCHC 32.4 31.5 - 36.5 g/dL    RDW 15.0 10.0 - 15.0 %    Platelet Count 85 (L) 150 - 450 10e9/L    Diff Method Manual Differential     % Neutrophils 75.0 %    % Lymphocytes 7.0 %    % Monocytes 7.0 %    % Eosinophils 0.0 %    % Basophils 0.0 %    % Band 2.0 %    % Metamyelocytes 2.0 %    % Myelocytes 7.0 %    Absolute Neutrophil 5.8 1.6 - 8.3 10e9/L    Absolute Lymphocytes 0.5 (L) 0.8 - 5.3 10e9/L    Absolute Monocytes 0.5 0.0 - 1.3 10e9/L    Absolute Eosinophils 0.0 0.0 - 0.7 10e9/L    Absolute Basophils 0.0 0.0 - 0.2 10e9/L    Absolute Bands 0.2 0.0 - 0.6 10e9/L    Absolute Metamyelocytes 0.2 (H) 0 10e9/L    Absolute Myelocytes 0.5 (H) 0 10e9/L    RBC Morphology Consistent with reported results     Platelet Estimate       Automated count confirmed.  Platelet morphology is normal.   Lactic acid whole blood   Result Value Ref Range    Lactic Acid 1.3 0.7 - 2.0 mmol/L   Procalcitonin   Result Value Ref Range    Procalcitonin 0.58 ng/ml           Attestation:  I have reviewed today's vital signs, notes, medications, labs and imaging.  Amount of time performed on this daily note: 40 minutes.     Ismael Romero MD, MD

## 2018-05-07 NOTE — PROGRESS NOTES
[facility]    Hospital Medicine   Cross Cover Note  Date of Service: 5/6/2018     I was called due to lactic elevated at 2.7.    Patient with elevated lactic at 2.7. Currently being treated for influenza-like-illness with known exposure to influenza B.     Subjective:  Patient currently complaining of fever, chills, lightheadedness and generalized body aches.     Also complaining of epigastric fullness and discomfort which has been present for the past week. She states she was seen at [facility] for this and got some sort of injection which she believes was steroids. It was also recommended that she take Miralax since she had not had a bowel movement in about a week. Prior to this she had been suffering from colitis and was having frequent bowel movements daily with blood and mucus. Last bowel movement on Friday, small. She is passing gas.    Denies cough, shortness of breath, chest pain, palpitations, dysuria, increase in urinary frequency or urgency.    Objective:  Triggered sepsis this afternoon. CBC showed WBC 9.2. Lactic 2.4. Procalcitonin 1.51 which is moderate risk for bacterial infection. Blood cultures pending. Negative UA on presentation.    Vital signs stable. Started on 1L of oxygen, though no hypoxia documented.  Appears uncomfortable on exam. Anxious. Somewhat diaphoretic and warm to touch.  Oropharynx is clear and moist. No erythema.  Lungs are clear to auscultation bilaterally.  Bowel sounds present. Abdomen is diffusely mildly tender.   Rash on chest and flushed face (states she gets this with prednisone). No other rash or wound noted.    Assessment & Plan:  # Fever, Chills, Malaise -- influenza like illness  Symptoms and exam appear consistent with influenza like illness. Procalcitonin of 1.51 risk of moderate infection would recommend antibiotics or repeat procalcitonin in 6-24 hours. No localizing signs or symptoms to direct empiric antibiotics.   - 1 L of LR then continuous  "at 125 cc/hr  - repeat lactic in 2 hours  - AM Procalctonin   - order CXR, though suspect low oxygen saturation due to narcotics as nursing reports desats with administration   - follow blood cultures  - follow pending respiratory PCR panel  - monitor for signs of localizing infection    #Epigastric fullness  Suspect due to constipation. CT performed on admission shows \"no acute abnormality. No bowel obstruction or inflammation.  - order home Miralax    Chantelle Lake PA-C    ADDENDUM 12:48 AM:  # Possible Community Acquired Pneumonia  CXR shows small retrocardiac infiltrate with concern for possible pneumonia. Possible that this is viral given known influenza exposure though, concern for bacterial given patient is immunocompromised on chemotherapy and chronic steroids.  - initiate ceftriaxone and doxycycline (patient has not tolerated azithromycin or levofloxacin in the past)  "

## 2018-05-07 NOTE — DISCHARGE INSTRUCTIONS
As a system [facility] wants to ensure that across the care continuum that you have support through care coordination services that include nurses and social workers in the outpatient setting.  Due to your multiple providers I feel it is important you have this support when you discharge.  They will be calling you within 24-48 hours of your discharge. A brochure describing the services was provided.  If you have questions you can reach out to your clinic directly and ask for the Care Coordinator assigned to your care.

## 2018-05-07 NOTE — PLAN OF CARE
Problem: Patient Care Overview  Goal: Plan of Care/Patient Progress Review  Outcome: Improving  Patient states still has pain and is requesting her pain med dilaudid or morphine . Patient is alert and up to bathroom with STAND BY ASSIST.pt is voiding well and taking oraly well. Patient uses her call l light and can let her needs be known.

## 2018-05-07 NOTE — PROGRESS NOTES
This nurse did vital signs and found patient 02 level at 86% patient was sleeping . Saturation pop up to 96% with patient coughing and deep breathing. Patient placed on 02 96% one liter of 02.pt is voiding good amounts. Call light with in reach of patient.

## 2018-05-07 NOTE — PLAN OF CARE
"Problem: Patient Care Overview  Goal: Plan of Care/Patient Progress Review  Outcome: No Change  Patient has had chills/sweats off and on during night, bed linen change x2. Pain being managed with ativan, dilaudid and morphine. Patient was started on Tamiful initially, Rocephin and Doxy were started tonight. Lung bases diminished. Patient's abd distended and she feels \"it's pushing on my diaphragm making it harder to breath at times.\" Titrating O2 down from 2L NC. Remains on droplet isolation. Lactic was rechecked at 0000 = 1.2. Patient is up with SBA to bathroom, has urgency and dribbling; wearing pad in underwear. Is intermittently tearful, reassurance provided.      "

## 2018-05-07 NOTE — PLAN OF CARE
"Problem: Patient Care Overview  Goal: Plan of Care/Patient Progress Review  Outcome: No Change  Patient continues to have \"body aches and pains\" for this she is taking dilaudid 4mg every 3 hours. No nausea/vomiting throughout shift. Patient and this writer decided to keep the \"clear\" trays as patient is tolerating this good. Very faint bowel sounds, gave miralax and 2 senna. Patient is anxious about this as she just got over being hospitalized with diarrhea. Patient is too weak now to ambulate, as this would help with the movement of the bowels, she politely declined the ambulation. IV is saline locked, patient is taking in liquids and voiding in good amounts. A bit teary-eyed at times. Moves in room with stand by assist. Lung sounds clear, diminished in bases.       "

## 2018-05-08 LAB
ANION GAP SERPL CALCULATED.3IONS-SCNC: 7 MMOL/L (ref 3–14)
BASOPHILS # BLD AUTO: 0 10E9/L (ref 0–0.2)
BASOPHILS NFR BLD AUTO: 0.2 %
BUN SERPL-MCNC: 8 MG/DL (ref 7–30)
CALCIUM SERPL-MCNC: 7.8 MG/DL (ref 8.5–10.1)
CHLORIDE SERPL-SCNC: 100 MMOL/L (ref 94–109)
CO2 SERPL-SCNC: 28 MMOL/L (ref 20–32)
CREAT SERPL-MCNC: 0.78 MG/DL (ref 0.52–1.04)
DIFFERENTIAL METHOD BLD: ABNORMAL
EOSINOPHIL # BLD AUTO: 0 10E9/L (ref 0–0.7)
EOSINOPHIL NFR BLD AUTO: 0.2 %
ERYTHROCYTE [DISTWIDTH] IN BLOOD BY AUTOMATED COUNT: 14.5 % (ref 10–15)
GFR SERPL CREATININE-BSD FRML MDRD: 81 ML/MIN/1.7M2
GLUCOSE SERPL-MCNC: 86 MG/DL (ref 70–99)
HCT VFR BLD AUTO: 36 % (ref 35–47)
HGB BLD-MCNC: 11.8 G/DL (ref 11.7–15.7)
IMM GRANULOCYTES # BLD: 0.5 10E9/L (ref 0–0.4)
IMM GRANULOCYTES NFR BLD: 4 %
LACTATE BLD-SCNC: 0.9 MMOL/L (ref 0.4–1.9)
LYMPHOCYTES # BLD AUTO: 1.4 10E9/L (ref 0.8–5.3)
LYMPHOCYTES NFR BLD AUTO: 11.1 %
MCH RBC QN AUTO: 28.5 PG (ref 26.5–33)
MCHC RBC AUTO-ENTMCNC: 32.8 G/DL (ref 31.5–36.5)
MCV RBC AUTO: 87 FL (ref 78–100)
MONOCYTES # BLD AUTO: 0.2 10E9/L (ref 0–1.3)
MONOCYTES NFR BLD AUTO: 1.5 %
NEUTROPHILS # BLD AUTO: 10.2 10E9/L (ref 1.6–8.3)
NEUTROPHILS NFR BLD AUTO: 83 %
PLATELET # BLD AUTO: 68 10E9/L (ref 150–450)
POTASSIUM SERPL-SCNC: 3.3 MMOL/L (ref 3.4–5.3)
POTASSIUM SERPL-SCNC: 4 MMOL/L (ref 3.4–5.3)
PROCALCITONIN SERPL-MCNC: 0.51 NG/ML
RBC # BLD AUTO: 4.14 10E12/L (ref 3.8–5.2)
SODIUM SERPL-SCNC: 135 MMOL/L (ref 133–144)
WBC # BLD AUTO: 12.3 10E9/L (ref 4–11)

## 2018-05-08 PROCEDURE — 27211289 ZZ H KIT CATH IV 4FR 8 CM OR 10 CM, POWERWAND QUICK XL

## 2018-05-08 PROCEDURE — 25000125 ZZHC RX 250: Performed by: FAMILY MEDICINE

## 2018-05-08 PROCEDURE — 25000132 ZZH RX MED GY IP 250 OP 250 PS 637: Performed by: FAMILY MEDICINE

## 2018-05-08 PROCEDURE — 36415 COLL VENOUS BLD VENIPUNCTURE: CPT | Performed by: FAMILY MEDICINE

## 2018-05-08 PROCEDURE — 25000132 ZZH RX MED GY IP 250 OP 250 PS 637: Performed by: EMERGENCY MEDICINE

## 2018-05-08 PROCEDURE — 83605 ASSAY OF LACTIC ACID: CPT | Performed by: FAMILY MEDICINE

## 2018-05-08 PROCEDURE — 36569 INSJ PICC 5 YR+ W/O IMAGING: CPT

## 2018-05-08 PROCEDURE — 85025 COMPLETE CBC W/AUTO DIFF WBC: CPT | Performed by: FAMILY MEDICINE

## 2018-05-08 PROCEDURE — 25000125 ZZHC RX 250: Performed by: INTERNAL MEDICINE

## 2018-05-08 PROCEDURE — 25000128 H RX IP 250 OP 636: Performed by: EMERGENCY MEDICINE

## 2018-05-08 PROCEDURE — 99232 SBSQ HOSP IP/OBS MODERATE 35: CPT | Performed by: FAMILY MEDICINE

## 2018-05-08 PROCEDURE — 12000000 ZZH R&B MED SURG/OB

## 2018-05-08 PROCEDURE — 25000128 H RX IP 250 OP 636: Performed by: FAMILY MEDICINE

## 2018-05-08 PROCEDURE — 84132 ASSAY OF SERUM POTASSIUM: CPT | Performed by: FAMILY MEDICINE

## 2018-05-08 PROCEDURE — 87338 HPYLORI STOOL AG IA: CPT | Performed by: FAMILY MEDICINE

## 2018-05-08 PROCEDURE — 80048 BASIC METABOLIC PNL TOTAL CA: CPT | Performed by: FAMILY MEDICINE

## 2018-05-08 PROCEDURE — 25000131 ZZH RX MED GY IP 250 OP 636 PS 637: Performed by: FAMILY MEDICINE

## 2018-05-08 PROCEDURE — 84145 PROCALCITONIN (PCT): CPT | Performed by: FAMILY MEDICINE

## 2018-05-08 PROCEDURE — 25000128 H RX IP 250 OP 636: Performed by: PHYSICIAN ASSISTANT

## 2018-05-08 RX ORDER — POTASSIUM CL/LIDO/0.9 % NACL 10MEQ/0.1L
10 INTRAVENOUS SOLUTION, PIGGYBACK (ML) INTRAVENOUS
Status: DISCONTINUED | OUTPATIENT
Start: 2018-05-08 | End: 2018-05-09 | Stop reason: HOSPADM

## 2018-05-08 RX ORDER — POTASSIUM CHLORIDE 1.5 G/1.58G
20-40 POWDER, FOR SOLUTION ORAL
Status: DISCONTINUED | OUTPATIENT
Start: 2018-05-08 | End: 2018-05-09 | Stop reason: HOSPADM

## 2018-05-08 RX ORDER — POTASSIUM CHLORIDE 7.45 MG/ML
10 INJECTION INTRAVENOUS
Status: DISCONTINUED | OUTPATIENT
Start: 2018-05-08 | End: 2018-05-09 | Stop reason: HOSPADM

## 2018-05-08 RX ORDER — POTASSIUM CHLORIDE 1500 MG/1
20-40 TABLET, EXTENDED RELEASE ORAL
Status: DISCONTINUED | OUTPATIENT
Start: 2018-05-08 | End: 2018-05-09 | Stop reason: HOSPADM

## 2018-05-08 RX ORDER — HEPARIN SODIUM,PORCINE 10 UNIT/ML
2-5 VIAL (ML) INTRAVENOUS
Status: DISCONTINUED | OUTPATIENT
Start: 2018-05-08 | End: 2018-05-09 | Stop reason: HOSPADM

## 2018-05-08 RX ORDER — LIDOCAINE 40 MG/G
CREAM TOPICAL
Status: DISCONTINUED | OUTPATIENT
Start: 2018-05-08 | End: 2018-05-09 | Stop reason: HOSPADM

## 2018-05-08 RX ADMIN — MORPHINE SULFATE 2 MG: 2 INJECTION, SOLUTION INTRAMUSCULAR; INTRAVENOUS at 00:06

## 2018-05-08 RX ADMIN — OSELTAMIVIR PHOSPHATE 75 MG: 75 CAPSULE ORAL at 07:51

## 2018-05-08 RX ADMIN — HYDROMORPHONE HYDROCHLORIDE 6 MG: 2 TABLET ORAL at 14:19

## 2018-05-08 RX ADMIN — ACETAMINOPHEN 1000 MG: 500 TABLET ORAL at 17:42

## 2018-05-08 RX ADMIN — DOXYCYCLINE 100 MG: 100 CAPSULE ORAL at 19:47

## 2018-05-08 RX ADMIN — MESALAMINE 4800 MG: 1.2 TABLET, DELAYED RELEASE ORAL at 07:50

## 2018-05-08 RX ADMIN — HYDROMORPHONE HYDROCHLORIDE 6 MG: 2 TABLET ORAL at 04:00

## 2018-05-08 RX ADMIN — CEFTRIAXONE SODIUM 2 G: 2 INJECTION, SOLUTION INTRAVENOUS at 00:22

## 2018-05-08 RX ADMIN — ACETAMINOPHEN 1000 MG: 500 TABLET ORAL at 10:25

## 2018-05-08 RX ADMIN — VENLAFAXINE HYDROCHLORIDE 150 MG: 150 TABLET, EXTENDED RELEASE ORAL at 07:52

## 2018-05-08 RX ADMIN — SODIUM CHLORIDE, POTASSIUM CHLORIDE, SODIUM LACTATE AND CALCIUM CHLORIDE 500 ML: 600; 310; 30; 20 INJECTION, SOLUTION INTRAVENOUS at 18:49

## 2018-05-08 RX ADMIN — PREDNISONE 60 MG: 50 TABLET ORAL at 07:51

## 2018-05-08 RX ADMIN — MAGNESIUM HYDROXIDE 30 ML: 400 SUSPENSION ORAL at 07:57

## 2018-05-08 RX ADMIN — ONDANSETRON 4 MG: 4 TABLET, ORALLY DISINTEGRATING ORAL at 14:43

## 2018-05-08 RX ADMIN — DOXYCYCLINE 100 MG: 100 CAPSULE ORAL at 07:50

## 2018-05-08 RX ADMIN — SODIUM CHLORIDE, POTASSIUM CHLORIDE, SODIUM LACTATE AND CALCIUM CHLORIDE 500 ML: 600; 310; 30; 20 INJECTION, SOLUTION INTRAVENOUS at 12:47

## 2018-05-08 RX ADMIN — IBUPROFEN 600 MG: 600 TABLET ORAL at 04:20

## 2018-05-08 RX ADMIN — SULFAMETHOXAZOLE AND TRIMETHOPRIM 1 TABLET: 400; 80 TABLET ORAL at 07:52

## 2018-05-08 RX ADMIN — HYDROMORPHONE HYDROCHLORIDE 6 MG: 2 TABLET ORAL at 20:35

## 2018-05-08 RX ADMIN — MORPHINE SULFATE 4 MG: 2 INJECTION, SOLUTION INTRAMUSCULAR; INTRAVENOUS at 07:46

## 2018-05-08 RX ADMIN — MORPHINE SULFATE 2 MG: 2 INJECTION, SOLUTION INTRAMUSCULAR; INTRAVENOUS at 05:54

## 2018-05-08 RX ADMIN — LORAZEPAM 1 MG: 0.5 TABLET ORAL at 21:41

## 2018-05-08 RX ADMIN — POTASSIUM CHLORIDE 40 MEQ: 1.5 POWDER, FOR SOLUTION ORAL at 10:26

## 2018-05-08 RX ADMIN — ACETAMINOPHEN 1000 MG: 500 TABLET ORAL at 00:18

## 2018-05-08 RX ADMIN — ROPINIROLE HYDROCHLORIDE 0.25 MG: 0.25 TABLET, FILM COATED ORAL at 21:39

## 2018-05-08 RX ADMIN — OSELTAMIVIR PHOSPHATE 75 MG: 75 CAPSULE ORAL at 17:38

## 2018-05-08 NOTE — PROGRESS NOTES
[facility] Hospitalist Progress Note           Assessment and Plan:     Sepsis due to influenza like illness vs bacterial pneumonia as below  5/7/2018 -- improving.  Lactic normalized.  Treat infection as below.    5/8/2018 -- improving overall, triggered protocol but lactic normal this AM.  Giving 500 cc lr bolus but overall improving as below.       Influenza-like illness  5/6/18 -- Patient here with influenza like symptoms.  Initial screening for influenza was negative. Will get PCR. She is on Tamiflu.  5/8/2018 -- flu PCR negative, but picture looks like possible ALEX still - continue Tamiflu for at least 1 more day.       Possible Community Acquired Pneumonia  5/6/18 -- CXR shows small retrocardiac infiltrate with concern for possible pneumonia. Possible that this is viral given known influenza exposure though, concern for bacterial given patient is immunocompromised on chemotherapy and chronic steroids.  - initiate ceftriaxone and doxycycline (patient has not tolerated azithromycin or levofloxacin in the past)  5/7/2018 -- improving, continue antibiotics.  Follow.     5/8/2018 -- still low grade temp overnight and WBC up today but just faintly - overall appears to be improving, however if she has another fever > 100.4 would repeat CBC and chest x-ray and likely switch antibiotics to zosyn, would add vanco only if notably worsened.   Again for now appears to be slowly improving.        Mild major depression (H)/anxiety -unspecified   5/6/18 -- No new symptoms -  Continue at home medication   5/8/2018 -- mood and affect stable.  No change.          Metastatic malignant melanoma (H)  5/6/18 -- Sees oncology, Continue outpatient treatment       Colitis - unspecified inflammatory colitis, now with constipation  5/6/18 -- Was getting infusions at [facility] and this has helped   5/7/2018 -- has been on prednisone 60 mg daily which has been continued for now.  Gets intermittent constipation with this.  Was planning to  start on cortifoam but awaiting prior authorization and says the medication will not be available until this fall.  Not using any suppositories or rectal creams at present.    Has plan to taper off prednisone (has prescription) and for follow-up with her GI providers as an outpatient.  Is on prophylactic bactrim at baseline.  Started on as needed medications for constipation here.   5/8/2018 -- now had stools x 2.  Patient is to call her MD at [facility] on discharge to coordinate follow-up.  HPylori was ordered per her GI MD's request and is pending.        Other chronic pain  5/7/2018 -- on oral dialudid at home although says she hasn't been using it recently.  Continued here for generalized achiness.      5/8/2018 -- no change.        GERD  5/7/2018 -- not on any medications.        Prophylaxis  Mechanical given low platelets.       Lines  PIV    Disposition  Slowly improving.  Hope for discharge home in 1-2 days if improving.              Interval History:   Feels about the same today - still feels chilled and sweaty today, although temp maxed out at 100.7 last night and has been afebrile so far today.  Breathing improved - currently satting 92% on room air.  No dyspnea.  No chest pain.  Still achy all over at times, no different.  Now had 2 soft bowel movements since yesterday.  No other concerns or new changes today.             Review of Systems:    ROS: 10 point ROS neg other than the symptoms noted above in the HPI.             Medications:   Current active medications and PTA medications reviewed, see medication list for details.            Physical Exam:   Vitals were reviewed  Patient Vitals for the past 24 hrs:   BP Temp Temp src Pulse Resp SpO2 Weight   05/08/18 1238 91/54 98.9  F (37.2  C) Oral 110 20 92 % -   05/08/18 0803 93/48 98.3  F (36.8  C) Oral 111 20 98 % -   05/08/18 0723 112/61 98.1  F (36.7  C) Oral 101 20 95 % -   05/08/18 0554 - - - - - - 98.8 kg (217 lb 13 oz)   05/08/18 0530 - 99.3  F (37.4   C) Oral - - - -   18 0404 110/65 100.7  F (38.2  C) Oral 104 20 99 % -   18 2356 123/81 100.7  F (38.2  C) Oral 112 18 99 % -   18 2100 111/67 98.3  F (36.8  C) Oral 77 18 93 % -   18 1903 - 99  F (37.2  C) Oral - - - -   18 1714 - 99  F (37.2  C) Oral - - - -   18 1541 109/70 98.5  F (36.9  C) Oral 77 16 91 % -       Temperatures:  Current - Temp: 98.9  F (37.2  C); Max - Temp  Av.1  F (37.3  C)  Min: 98.1  F (36.7  C)  Max: 100.7  F (38.2  C)  Respiration range: Resp  Av.9  Min: 16  Max: 20  Pulse range: Pulse  Av.9  Min: 77  Max: 112  Blood pressure range: Systolic (24hrs), Av , Min:91 , Max:123   ; Diastolic (24hrs), Av, Min:48, Max:81    Pulse oximetry range: SpO2  Av.3 %  Min: 91 %  Max: 99 %  I/O last 3 completed shifts:  In: 5713 [P.O.:3560; I.V.:2153]  Out: 3450 [Urine:3450]    Intake/Output Summary (Last 24 hours) at 18 1243  Last data filed at 18 1100   Gross per 24 hour   Intake             4933 ml   Output             2450 ml   Net             2483 ml     EXAM:  General: awake and alert, NAD, oriented x 3  Head: normocephalic  Neck: unremarkable, no lymphadenopathy   HEENT: oropharynx pink and moist    Heart: Regular rate and rhythm, no murmurs, rubs, or gallops  Lungs: clear to auscultation bilaterally with good air movement throughout  Abdomen: soft, non-tender, no masses or organomegaly  Extremities: no edema in lower extremities   Skin unremarkable.               Data:     Results for orders placed or performed during the hospital encounter of 18 (from the past 24 hour(s))   CBC with platelets differential   Result Value Ref Range    WBC 12.3 (H) 4.0 - 11.0 10e9/L    RBC Count 4.14 3.8 - 5.2 10e12/L    Hemoglobin 11.8 11.7 - 15.7 g/dL    Hematocrit 36.0 35.0 - 47.0 %    MCV 87 78 - 100 fl    MCH 28.5 26.5 - 33.0 pg    MCHC 32.8 31.5 - 36.5 g/dL    RDW 14.5 10.0 - 15.0 %    Platelet Count 68 (L) 150 - 450 10e9/L     Diff Method Automated Method     % Neutrophils 83.0 %    % Lymphocytes 11.1 %    % Monocytes 1.5 %    % Eosinophils 0.2 %    % Basophils 0.2 %    % Immature Granulocytes 4.0 %    Absolute Neutrophil 10.2 (H) 1.6 - 8.3 10e9/L    Absolute Lymphocytes 1.4 0.8 - 5.3 10e9/L    Absolute Monocytes 0.2 0.0 - 1.3 10e9/L    Absolute Eosinophils 0.0 0.0 - 0.7 10e9/L    Absolute Basophils 0.0 0.0 - 0.2 10e9/L    Abs Immature Granulocytes 0.5 (H) 0 - 0.4 10e9/L   Basic metabolic panel   Result Value Ref Range    Sodium 135 133 - 144 mmol/L    Potassium 3.3 (L) 3.4 - 5.3 mmol/L    Chloride 100 94 - 109 mmol/L    Carbon Dioxide 28 20 - 32 mmol/L    Anion Gap 7 3 - 14 mmol/L    Glucose 86 70 - 99 mg/dL    Urea Nitrogen 8 7 - 30 mg/dL    Creatinine 0.78 0.52 - 1.04 mg/dL    GFR Estimate 81 >60 mL/min/1.7m2    GFR Estimate If Black >90 >60 mL/min/1.7m2    Calcium 7.8 (L) 8.5 - 10.1 mg/dL   Procalcitonin   Result Value Ref Range    Procalcitonin 0.51 ng/ml   Lactic acid level STAT for sepsis protocol   Result Value Ref Range    Lactate for Sepsis Protocol 0.9 0.4 - 1.9 mmol/L           Attestation:  I have reviewed today's vital signs, notes, medications, labs and imaging.  Amount of time performed on this daily note: 30 minutes.     Ismael Romero MD, MD

## 2018-05-08 NOTE — PROGRESS NOTES
[facility]  Procedure Note          Peripherally Inserted Central Line Catheter (PICC):       Doretha Fernandez  MRN# [id number]   May 8, 2018, 6:51 PM Indication: Medication administration           Pause for the cause: Consent for catheter placement procedure signed  Time out completed  Patient ID's verified using two distinct indicators  All necessary equipment is present  Site marked if extremity to be used has been predetermined   Type of line to be used: Midline catheter   Full barrier precautions used: Yes   Skin preparation: Chloraprep   Date of insertion: May 8, 2018, 6:51 PM   Device type: Single lumen, non-valved, 4.0   Catheter brand: Reclip.It   Lot number: [id number]   Insertion location: Right basilic vein   Method of placement: Venipuncture  MST  Ultrasound   Number of attempts: With ultrasound: 1   Without ultrasound: 0   Difficulty threading: No   Midline IV device: Dressing dry and intact  Transparent semmipermeable dressing applied  Chlorhexidine patch  Catheter securement device   Arm circumference: Adults 15 cm   Midline extremity circumference: 38 cm   Internal length: 10 cm   Midline visible catheter length: 0 cm   Total catheter length: 10 cm   Tip termination: Axilla (midline)   Method of verification: Measurement to axilla   Midline patency post placement: Positive blood return  Flushes without difficulty   Line flush: Line flush documented on the eMAR   Placement verified by: Physician   Catheter placed by: Francois hBatt   Discontinuation form initiated: Yes   Patient tolerance: Tolerated well   PICC Insertion Education Complete: Yes      Summary:  This procedure was performed without difficulty and she tolerated the procedure well with no immediate complications.       Recorded by Francois Bhatt    Attestation:

## 2018-05-08 NOTE — PLAN OF CARE
Problem: Pneumonia (Adult)  Goal: Signs and Symptoms of Listed Potential Problems Will be Absent, Minimized or Managed (Pneumonia)  Signs and symptoms of listed potential problems will be absent, minimized or managed by discharge/transition of care (reference Pneumonia (Adult) CPG).   Outcome: No Change  Patients potassium is 3.3 gave patient 40 meq of potassium she refused 2nd dose of potassium due to nausea will check K+ level at 1300

## 2018-05-08 NOTE — PROGRESS NOTES
Pt tolerated routine Midline placement to Right Basilic vein.  Good blood flow and flushes without difficulty.  Measurement places tip in axilla.  OK to use.

## 2018-05-08 NOTE — PLAN OF CARE
Problem: Patient Care Overview  Goal: Plan of Care/Patient Progress Review  Outcome: Declining  At HS pt in chair at sink and washed up, brushed teeth, feeling anxious and hoping to sleep, requested Ativan, also gave HS meds and pain med. O2 applied for comfort. Pt slept for approx. 2 hours then awoke w/ chills and pain, oral temp 100. Gave Tylenol and IV morphine, she is sleeping again, BA on for safety. C/o being very thirsty, drinking water almost profusely. Also voiding large amts.

## 2018-05-09 ENCOUNTER — HOSPITAL ENCOUNTER (EMERGENCY)
Facility: CLINIC | Age: 43
Discharge: HOME | End: 2018-05-09
Attending: STUDENT IN AN ORGANIZED HEALTH CARE EDUCATION/TRAINING PROGRAM | Admitting: STUDENT IN AN ORGANIZED HEALTH CARE EDUCATION/TRAINING PROGRAM
Payer: COMMERCIAL

## 2018-05-09 ENCOUNTER — NURSE TRIAGE (OUTPATIENT)
Dept: NURSING | Facility: CLINIC | Age: 43
End: 2018-05-09

## 2018-05-09 VITALS
WEIGHT: 206 LBS | HEIGHT: 63 IN | TEMPERATURE: 100.6 F | HEART RATE: 107 BPM | OXYGEN SATURATION: 97 % | SYSTOLIC BLOOD PRESSURE: 112 MMHG | DIASTOLIC BLOOD PRESSURE: 79 MMHG | RESPIRATION RATE: 18 BRPM | BODY MASS INDEX: 36.5 KG/M2

## 2018-05-09 VITALS
RESPIRATION RATE: 20 BRPM | OXYGEN SATURATION: 96 % | TEMPERATURE: 98.1 F | SYSTOLIC BLOOD PRESSURE: 108 MMHG | WEIGHT: 213.63 LBS | BODY MASS INDEX: 37.85 KG/M2 | DIASTOLIC BLOOD PRESSURE: 49 MMHG | HEIGHT: 63 IN | HEART RATE: 90 BPM

## 2018-05-09 DIAGNOSIS — R50.9 FEVER, UNSPECIFIED FEVER CAUSE: ICD-10-CM

## 2018-05-09 DIAGNOSIS — J11.1 INFLUENZA-LIKE ILLNESS: ICD-10-CM

## 2018-05-09 DIAGNOSIS — M79.10 MYALGIA: ICD-10-CM

## 2018-05-09 LAB
ANION GAP SERPL CALCULATED.3IONS-SCNC: 5 MMOL/L (ref 3–14)
BASOPHILS # BLD AUTO: 0 10E9/L (ref 0–0.2)
BASOPHILS NFR BLD AUTO: 0 %
BUN SERPL-MCNC: 9 MG/DL (ref 7–30)
CALCIUM SERPL-MCNC: 7.6 MG/DL (ref 8.5–10.1)
CHLORIDE SERPL-SCNC: 99 MMOL/L (ref 94–109)
CO2 SERPL-SCNC: 30 MMOL/L (ref 20–32)
CREAT SERPL-MCNC: 0.79 MG/DL (ref 0.52–1.04)
DIFFERENTIAL METHOD BLD: ABNORMAL
EOSINOPHIL # BLD AUTO: 0 10E9/L (ref 0–0.7)
EOSINOPHIL NFR BLD AUTO: 0 %
ERYTHROCYTE [DISTWIDTH] IN BLOOD BY AUTOMATED COUNT: 14.7 % (ref 10–15)
GFR SERPL CREATININE-BSD FRML MDRD: 79 ML/MIN/1.7M2
GLUCOSE SERPL-MCNC: 113 MG/DL (ref 70–99)
H PYLORI AG STL QL IA: NORMAL
HCT VFR BLD AUTO: 33.2 % (ref 35–47)
HGB BLD-MCNC: 11 G/DL (ref 11.7–15.7)
LYMPHOCYTES # BLD AUTO: 2 10E9/L (ref 0.8–5.3)
LYMPHOCYTES NFR BLD AUTO: 28 %
MCH RBC QN AUTO: 28.6 PG (ref 26.5–33)
MCHC RBC AUTO-ENTMCNC: 33.1 G/DL (ref 31.5–36.5)
MCV RBC AUTO: 86 FL (ref 78–100)
METAMYELOCYTES # BLD: 0.1 10E9/L
METAMYELOCYTES NFR BLD MANUAL: 1 %
MONOCYTES # BLD AUTO: 0.4 10E9/L (ref 0–1.3)
MONOCYTES NFR BLD AUTO: 5 %
MYELOCYTES # BLD: 0.3 10E9/L
MYELOCYTES NFR BLD MANUAL: 4 %
NEUTROPHILS # BLD AUTO: 4.3 10E9/L (ref 1.6–8.3)
NEUTROPHILS NFR BLD AUTO: 61 %
NEUTS BAND # BLD AUTO: 0.1 10E9/L (ref 0–0.6)
NEUTS BAND NFR BLD MANUAL: 1 %
PLATELET # BLD AUTO: 50 10E9/L (ref 150–450)
PLATELET # BLD EST: ABNORMAL 10*3/UL
POTASSIUM SERPL-SCNC: 3.5 MMOL/L (ref 3.4–5.3)
RBC # BLD AUTO: 3.85 10E12/L (ref 3.8–5.2)
RBC MORPH BLD: ABNORMAL
SODIUM SERPL-SCNC: 134 MMOL/L (ref 133–144)
SPECIMEN SOURCE: NORMAL
WBC # BLD AUTO: 7.1 10E9/L (ref 4–11)

## 2018-05-09 PROCEDURE — 80048 BASIC METABOLIC PNL TOTAL CA: CPT | Performed by: FAMILY MEDICINE

## 2018-05-09 PROCEDURE — 25000132 ZZH RX MED GY IP 250 OP 250 PS 637: Performed by: FAMILY MEDICINE

## 2018-05-09 PROCEDURE — 99239 HOSP IP/OBS DSCHRG MGMT >30: CPT | Performed by: FAMILY MEDICINE

## 2018-05-09 PROCEDURE — 25000132 ZZH RX MED GY IP 250 OP 250 PS 637: Performed by: EMERGENCY MEDICINE

## 2018-05-09 PROCEDURE — 99283 EMERGENCY DEPT VISIT LOW MDM: CPT | Mod: Z6 | Performed by: STUDENT IN AN ORGANIZED HEALTH CARE EDUCATION/TRAINING PROGRAM

## 2018-05-09 PROCEDURE — 25000125 ZZHC RX 250: Performed by: FAMILY MEDICINE

## 2018-05-09 PROCEDURE — 85025 COMPLETE CBC W/AUTO DIFF WBC: CPT | Performed by: FAMILY MEDICINE

## 2018-05-09 PROCEDURE — 25000125 ZZHC RX 250: Performed by: INTERNAL MEDICINE

## 2018-05-09 PROCEDURE — 25000128 H RX IP 250 OP 636: Performed by: PHYSICIAN ASSISTANT

## 2018-05-09 PROCEDURE — 25000131 ZZH RX MED GY IP 250 OP 636 PS 637: Performed by: FAMILY MEDICINE

## 2018-05-09 PROCEDURE — 25000128 H RX IP 250 OP 636: Performed by: FAMILY MEDICINE

## 2018-05-09 PROCEDURE — 99282 EMERGENCY DEPT VISIT SF MDM: CPT | Performed by: STUDENT IN AN ORGANIZED HEALTH CARE EDUCATION/TRAINING PROGRAM

## 2018-05-09 RX ORDER — LIDOCAINE 40 MG/G
CREAM TOPICAL
Status: DISCONTINUED | OUTPATIENT
Start: 2018-05-09 | End: 2018-05-10 | Stop reason: HOSPADM

## 2018-05-09 RX ORDER — IBUPROFEN 400 MG/1
400 TABLET, FILM COATED ORAL ONCE
Status: DISCONTINUED | OUTPATIENT
Start: 2018-05-09 | End: 2018-05-10 | Stop reason: HOSPADM

## 2018-05-09 RX ORDER — PROMETHAZINE HYDROCHLORIDE 25 MG/1
25 TABLET ORAL EVERY 4 HOURS PRN
Qty: 56 TABLET | Refills: 0 | Status: SHIPPED | OUTPATIENT
Start: 2018-05-09 | End: 2020-01-27

## 2018-05-09 RX ORDER — OSELTAMIVIR PHOSPHATE 75 MG/1
75 CAPSULE ORAL 2 TIMES DAILY
Qty: 2 CAPSULE | Refills: 0 | Status: SHIPPED | OUTPATIENT
Start: 2018-05-09 | End: 2019-02-18

## 2018-05-09 RX ORDER — ACETAMINOPHEN 500 MG
500 TABLET ORAL ONCE
Status: DISCONTINUED | OUTPATIENT
Start: 2018-05-09 | End: 2018-05-10 | Stop reason: HOSPADM

## 2018-05-09 RX ORDER — DOXYCYCLINE 100 MG/1
100 CAPSULE ORAL EVERY 12 HOURS
Qty: 8 CAPSULE | Refills: 0 | Status: SHIPPED | OUTPATIENT
Start: 2018-05-09 | End: 2019-02-18

## 2018-05-09 RX ORDER — LACTOBACILLUS ACIDOPHILUS 500MM CELL
1 CAPSULE ORAL
Qty: 12 CAPSULE | Refills: 0 | Status: SHIPPED | OUTPATIENT
Start: 2018-05-09 | End: 2019-02-18

## 2018-05-09 RX ORDER — HYDROMORPHONE HYDROCHLORIDE 4 MG/1
4-6 TABLET ORAL
Qty: 30 TABLET | Refills: 0 | Status: SHIPPED | OUTPATIENT
Start: 2018-05-09 | End: 2019-01-28

## 2018-05-09 RX ADMIN — OSELTAMIVIR PHOSPHATE 75 MG: 75 CAPSULE ORAL at 08:18

## 2018-05-09 RX ADMIN — HYDROMORPHONE HYDROCHLORIDE 6 MG: 2 TABLET ORAL at 05:53

## 2018-05-09 RX ADMIN — CEFTRIAXONE SODIUM 2 G: 2 INJECTION, SOLUTION INTRAVENOUS at 01:25

## 2018-05-09 RX ADMIN — PREDNISONE 60 MG: 50 TABLET ORAL at 08:18

## 2018-05-09 RX ADMIN — VENLAFAXINE HYDROCHLORIDE 150 MG: 150 TABLET, EXTENDED RELEASE ORAL at 08:18

## 2018-05-09 RX ADMIN — ONDANSETRON 4 MG: 4 TABLET, ORALLY DISINTEGRATING ORAL at 12:03

## 2018-05-09 RX ADMIN — ACETAMINOPHEN 1000 MG: 500 TABLET ORAL at 08:16

## 2018-05-09 RX ADMIN — PROMETHAZINE HYDROCHLORIDE 25 MG: 25 INJECTION INTRAMUSCULAR; INTRAVENOUS at 10:00

## 2018-05-09 RX ADMIN — DOXYCYCLINE 100 MG: 100 CAPSULE ORAL at 08:17

## 2018-05-09 RX ADMIN — MESALAMINE 4800 MG: 1.2 TABLET, DELAYED RELEASE ORAL at 08:20

## 2018-05-09 RX ADMIN — HYDROMORPHONE HYDROCHLORIDE 6 MG: 2 TABLET ORAL at 01:24

## 2018-05-09 RX ADMIN — ACETAMINOPHEN 1000 MG: 500 TABLET ORAL at 01:25

## 2018-05-09 RX ADMIN — SULFAMETHOXAZOLE AND TRIMETHOPRIM 1 TABLET: 400; 80 TABLET ORAL at 08:19

## 2018-05-09 RX ADMIN — HYDROMORPHONE HYDROCHLORIDE 6 MG: 2 TABLET ORAL at 12:03

## 2018-05-09 NOTE — DISCHARGE SUMMARY
[facility] Discharge Summary    Doretha Fernandez MRN# [id number]   Age: 43 year old YOB: 1975     Date of Admission:  5/5/2018  Date of Discharge::  5/9/2018  Admitting Physician:  Ollie Cuevas MD  Discharge Physician:  Ismael Romero MD, MD             Admission Diagnoses:   Immunosuppressed status (H) [D89.9]  Influenza-like illness [R69]  Constipation, unspecified constipation type [K59.00]          Principle Discharge Diagnosis:     Sepsis due to influenza like illness vs bacterial pneumonia as below    See hospital course for further active diagnoses addressed during this admission.            Procedures:   No procedures performed during this admission          Medications Prior to Admission:     Prescriptions Prior to Admission   Medication Sig Dispense Refill Last Dose     predniSONE (DELTASONE) 10 MG tablet Take 2 tablets (20 mg) by mouth daily (Patient taking differently: Take 60 mg by mouth daily ) 100 tablet 1 5/5/2018 at am     sulfamethoxazole-trimethoprim (BACTRIM/SEPTRA) 400-80 MG per tablet Take 1 tablet by mouth daily While on high dose steroids (prednisone 20mg or greater)   5/5/2018 at am     Acetaminophen (TYLENOL PO) Take 1,000 mg by mouth every 8 hours as needed for mild pain or fever   5/5/2018 at am     Cholecalciferol (VITAMIN D3) 3000 units TABS Take 3,000 Int'l Units by mouth daily 30 tablet 3 5/5/2018 at am     hydrocortisone (CORTENEMA) 100 MG/60ML enema 1 enema every night for 21 days; then 1 enema every other night for 1 week and then stop (Patient not taking: Reported on 4/13/2018) 24 enema 0 Not Taking     LORazepam (ATIVAN) 0.5 MG tablet Take 1-2 tablets (0.5-1 mg) by mouth every 6 hours as needed for anxiety or other (nausea) 40 tablet 0 5/4/2018 at hs     mesalamine (LIALDA) 1.2 g EC tablet Take 4 tablets (4,800 mg) by mouth every morning 120 tablet 3 5/5/2018 at am     order for DME Equipment being ordered: 20-30mmHg compression sleeve and  "20-30mmHg compression glove\" x2 pair 1 Units 0 Taking     order for DME Equipment being ordered: x2 Wearease compression shirt 1 each 0 Taking     rOPINIRole (REQUIP) 0.25 MG tablet Take 1 tablet (0.25 mg) by mouth At Bedtime 30 tablet 11 5/4/2018 at hs     venlafaxine (EFFEXOR-ER) 150 MG TB24 24 hr tablet Take 1 tablet (150 mg) by mouth daily (with breakfast) 90 each 1 5/5/2018 at am     [DISCONTINUED] hydrocortisone (ANUSOL-HC) 25 MG Suppository Place 1 suppository (25 mg) rectally 2 times daily 56 suppository 3      [DISCONTINUED] hydrocortisone (CORTIFOAM) 10 % rectal foam Place 1 applicator rectally daily 15 g 3      [DISCONTINUED] pramoxine (PROCTOFOAM) 1 % foam Place rectally 2 times daily 15 g 1              Discharge Medications:     Current Discharge Medication List      START taking these medications    Details   Acidophilus Lactobacillus CAPS Take 1 tablet by mouth 3 times daily (with meals) for 4 days  Qty: 12 capsule, Refills: 0    Associated Diagnoses: Pneumonia due to infectious organism, unspecified laterality, unspecified part of lung      amoxicillin-clavulanate (AUGMENTIN) 875-125 MG per tablet Take 1 tablet by mouth 2 times daily for 4 days  Qty: 8 tablet, Refills: 0    Associated Diagnoses: Pneumonia due to infectious organism, unspecified laterality, unspecified part of lung      doxycycline (VIBRAMYCIN) 100 MG capsule Take 1 capsule (100 mg) by mouth every 12 hours for 4 days  Qty: 8 capsule, Refills: 0    Associated Diagnoses: Pneumonia due to infectious organism, unspecified laterality, unspecified part of lung      oseltamivir (TAMIFLU) 75 MG capsule Take 1 capsule (75 mg) by mouth 2 times daily for 2 doses  Qty: 2 capsule, Refills: 0    Associated Diagnoses: Influenza-like illness         CONTINUE these medications which have CHANGED    Details   HYDROmorphone (DILAUDID) 4 MG tablet Take 1-1.5 tablets (4-6 mg) by mouth every 3 hours as needed for moderate to severe pain  Qty: 30 tablet, " "Refills: 0    Associated Diagnoses: Proctocolitis      promethazine (PHENERGAN) 25 MG tablet Take 1 tablet (25 mg) by mouth every 4 hours as needed for nausea or vomiting  Qty: 56 tablet, Refills: 0    Associated Diagnoses: Proctocolitis         CONTINUE these medications which have NOT CHANGED    Details   predniSONE (DELTASONE) 10 MG tablet Take 2 tablets (20 mg) by mouth daily  Qty: 100 tablet, Refills: 1    Associated Diagnoses: Malignant melanoma of left upper extremity including shoulder (H); Colitis      sulfamethoxazole-trimethoprim (BACTRIM/SEPTRA) 400-80 MG per tablet Take 1 tablet by mouth daily While on high dose steroids (prednisone 20mg or greater)      Acetaminophen (TYLENOL PO) Take 1,000 mg by mouth every 8 hours as needed for mild pain or fever      Cholecalciferol (VITAMIN D3) 3000 units TABS Take 3,000 Int'l Units by mouth daily  Qty: 30 tablet, Refills: 3    Associated Diagnoses: Vitamin D deficiency      hydrocortisone (CORTENEMA) 100 MG/60ML enema 1 enema every night for 21 days; then 1 enema every other night for 1 week and then stop  Qty: 24 enema, Refills: 0    Associated Diagnoses: Colitis      LORazepam (ATIVAN) 0.5 MG tablet Take 1-2 tablets (0.5-1 mg) by mouth every 6 hours as needed for anxiety or other (nausea)  Qty: 40 tablet, Refills: 0    Associated Diagnoses: Proctocolitis; Anxiety state; Metastatic malignant melanoma (H)      mesalamine (LIALDA) 1.2 g EC tablet Take 4 tablets (4,800 mg) by mouth every morning  Qty: 120 tablet, Refills: 3    Associated Diagnoses: Colitis      !! order for DME Equipment being ordered: 20-30mmHg compression sleeve and 20-30mmHg compression glove\" x2 pair  Qty: 1 Units, Refills: 0    Associated Diagnoses: Lymphedema of left arm      !! order for DME Equipment being ordered: x2 Wearease compression shirt  Qty: 1 each, Refills: 0    Associated Diagnoses: Secondary lymphedema      rOPINIRole (REQUIP) 0.25 MG tablet Take 1 tablet (0.25 mg) by mouth At " Bedtime  Qty: 30 tablet, Refills: 11    Comments: File until needed  Associated Diagnoses: Restless leg syndrome      venlafaxine (EFFEXOR-ER) 150 MG TB24 24 hr tablet Take 1 tablet (150 mg) by mouth daily (with breakfast)  Qty: 90 each, Refills: 1    Associated Diagnoses: Mild major depression (H)       !! - Potential duplicate medications found. Please discuss with provider.      STOP taking these medications       hydrocortisone (ANUSOL-HC) 25 MG Suppository Comments:   Reason for Stopping:         hydrocortisone (CORTIFOAM) 10 % rectal foam Comments:   Reason for Stopping:         pramoxine (PROCTOFOAM) 1 % foam Comments:   Reason for Stopping:                     Consultations:   No consultations were requested during this admission          Brief History of Illness:     From Admission H+P:   This patient is a 43 year old  female with a significant past medical history of metastatic malignanat melanoma,colitis and depression   who presents with the following condition requiring a hospital admission:     Patient is a 43 yr old female here for the above symptoms. According to her , her symptoms started suddenly yesterday around 5 she had chills and on taking her temp she had a 101 temp. She also experienced generalized body aches. Patient reports exposure to Influenza B from her daughter. She reports no cough and no URI symptoms. She denies any shortness of breath or chest tightness. She says she is in a lot of pain . She also mentioned abdominal distention and pain. She has been struggling with colitis for a couple of months and reports that she was getting some infusions at the [facility] which helped. Her last infusion was sometime this last week. She says she feels like she is having a bout of the colitis presently but a CT done in the ED showed improvement in the colitis. She was started empirically on Tamiflu though her initial swab came back negative.               TODAY:  "    Subjective:  Improving.  Still achy all over but less so.  Abdomen feels baseline, had large bowel movement yesterday.  None since, feels like this is doing well.  No sweats today.  No new pain.    Breathing remains normal on room air.       ROS:   ROS: 10 point ROS neg other than the symptoms noted above in the HPI.     /49 (BP Location: Left leg)  Pulse 90  Temp 98.1  F (36.7  C) (Oral)  Resp 20  Ht 1.6 m (5' 3\")  Wt 96.9 kg (213 lb 10 oz)  SpO2 96%  BMI 37.84 kg/m2   EXAM:  General: awake and alert, NAD, oriented x 3  Head: normocephalic  Neck: unremarkable, no lymphadenopathy   HEENT: oropharynx pink and moist    Heart: Regular rate and rhythm, no murmurs, rubs, or gallops  Lungs: clear to auscultation bilaterally with good air movement throughout  Abdomen: soft, non-tender other than perhaps very faint tenderness left upper quadrant which patient says is baseline, no masses or organomegaly, bowel sounds present, non-distended.    Extremities: no edema in lower extremities   Skin unremarkable.            Hospital Course:     Sepsis due to influenza like illness vs bacterial pneumonia as below  5/7/2018 -- improving.  Lactic normalized.  Treat infection as below.    5/8/2018 -- improving overall, triggered protocol but lactic normal this AM.  Giving 500 cc lr bolus but overall improving as below.    5/9/2018 -- doing well.  Sepsis resolved.         Influenza-like illness  5/6/18 -- Patient here with influenza like symptoms.  Initial screening for influenza was negative. Will get PCR. She is on Tamiflu.  5/8/2018 -- flu PCR negative, but picture looks like possible ALEX still - continue Tamiflu for at least 1 more day.    5/9/2018 -- doing well.   Afebrile x > 36 hours, ok to discharge to complete 1 more day of tamiflu given convincing symptoms despite negative labs.        Possible Community Acquired Pneumonia  5/6/18 -- CXR shows small retrocardiac infiltrate with concern for possible pneumonia. " Possible that this is viral given known influenza exposure though, concern for bacterial given patient is immunocompromised on chemotherapy and chronic steroids.  - initiate ceftriaxone and doxycycline (patient has not tolerated azithromycin or levofloxacin in the past)  5/7/2018 -- improving, continue antibiotics.  Follow.     5/8/2018 -- still low grade temp overnight and WBC up today but just faintly - overall appears to be improving, however if she has another fever > 100.4 would repeat CBC and chest x-ray and likely switch antibiotics to zosyn, would add vanco only if notably worsened.   Again for now appears to be slowly improving.    5/9/2018 -- doing well, WBC normalized, afebrile as above.  Feels improved although still achy and tired. Given immune suppression on high dose steroids will continue doxycycline and augmentin x 4 more days on discharge, but doing well and ok for discharge today.         Mild major depression (H)/anxiety -unspecified   5/6/18 -- No new symptoms -  Continue at home medication   5/8/2018 -- mood and affect stable.  No change.          Metastatic malignant melanoma (H)  5/6/18 -- Sees oncology, Continue outpatient treatment       Colitis - unspecified inflammatory colitis, now with constipation  5/6/18 -- Was getting infusions at [place] and this has helped   5/7/2018 -- has been on prednisone 60 mg daily which has been continued for now.  Gets intermittent constipation with this.  Was planning to start on cortifoam but awaiting prior authorization and says the medication will not be available until this fall.  Not using any suppositories or rectal creams at present.    Has plan to taper off prednisone (has prescription) and for follow-up with her GI providers as an outpatient.  Is on prophylactic bactrim at baseline.  Started on as needed medications for constipation here.   5/8/2018 -- now had stools x 2.  Patient is to call her MD at [place] on discharge to coordinate follow-up.     5/9/2018 -- at baseline, had bowel movement yesterday - her MD from [place] is planning to call her later today or tomorrow to see how she's doing and to coordinate follow-up/next steps.  HPylori was ordered per her GI MD's request and is pending.        Other chronic pain  5/7/2018 -- on oral dialudid at home although says she hasn't been using it recently.  Continued here for generalized achiness.      5/9/2018 -- no change. Gave some additional dilaudid on discharge for generalized achiness.         GERD  5/7/2018 -- not on any medications.         Prophylaxis  Mechanical given low platelets.                Discharge Instructions and Follow-Up:   Discharge diet: Regular   Discharge activity: Activity as tolerated   Discharge follow-up: Follow up with primary care provider in 7 days and with gastroenterologist as per their recommendations.             Discharge Disposition:   Discharged to home      Attestation:  I have reviewed today's vital signs, notes, medications, labs and imaging.  Amount of time performed on this discharge summary: 60 minutes.    Ismael Romero MD, MD

## 2018-05-09 NOTE — ADDENDUM NOTE
Encounter addended by: Anna Graham, PT on: 5/9/2018  4:19 PM<BR>     Actions taken: Flowsheet accepted, Sign clinical note

## 2018-05-09 NOTE — PLAN OF CARE
Problem: Patient Care Overview  Goal: Plan of Care/Patient Progress Review  Outcome: Completed Date Met: 05/09/18  FRANNIE ROMERO DISCHARGE NOTE    Patient discharged to home at 3:30 PM via wheel chair. Accompanied by father and staff. Discharge instructions reviewed with patient, opportunity offered to ask questions. Prescriptions sent to patients preferred pharmacy. All belongings sent with patient.    Shakila Silva

## 2018-05-09 NOTE — ED AVS SNAPSHOT
[facility]    [address] [address]    Phone:  [phone]    Fax:  [phone]                                       Doretha Fernandez   MRN: [id number]    Department:  [facility]   Date of Visit:  5/9/2018           Patient Information     Date Of Birth          1975        Your diagnoses for this visit were:     Fever, unspecified fever cause     Influenza-like illness     Myalgia        You were seen by Mauro Swanson DO.      Follow-up Information     Follow up with Anna Naidu MD. Schedule an appointment as soon as possible for a visit in 3 days.    Specialty:  Family Practice    Why:  Followup for reevaluation and managment plan.    Contact information:    [address] [place] [address] [id number] [phone]          Go to [facility].    Specialty:  EMERGENCY MEDICINE    Why:  Return if desire to have medical evaluation or symptoms change/progress.    Contact information:    [address] [place] [address] [phone]    Additional information:    The medical center is located at   [address] [place] [address] (between [facility] and   [place] [address] in [place], [place] miles north   of [place]).      Discharge References/Attachments     (ADULT), INFLUENZA (ENGLISH)    ACETAMINOPHEN TABLETS OR CAPLETS (ENGLISH)      Your next 10 appointments already scheduled     May 11, 2018 10:40 AM CDT   [facility]    [address] [place] [address] [phone]           Bring a current list of meds and any records pertaining to this visit. For Physicals, please bring immunization records and any forms needing to be filled out. Please arrive 10 minutes early to complete paperwork.            May 21, 2018 12:20 PM CDT   (Arrive by 12:05 PM)   Return Visit with GURWINDER [facility]  Gastroenterology and [facility] ([facility])    [address]  4th Floor  [facility] [address] [phone]              24 Hour Appointment Hotline       To make an appointment at any [facility], call [phone] ([phone]). If you don't have a family doctor or clinic, we will help you find one. [place] clinics are conveniently located to serve the needs of you and your family.             Review of your medicines      Our records show that you are taking the medicines listed below. If these are incorrect, please call your family doctor or clinic.        Dose / Directions Last dose taken    Acidophilus Lactobacillus Caps   Dose:  1 tablet   Quantity:  12 capsule        Take 1 tablet by mouth 3 times daily (with meals) for 4 days   Refills:  0        amoxicillin-clavulanate 875-125 MG per tablet   Commonly known as:  AUGMENTIN   Dose:  1 tablet   Quantity:  8 tablet   Start taking on:  5/10/2018        Take 1 tablet by mouth 2 times daily for 4 days   Refills:  0        doxycycline 100 MG capsule   Commonly known as:  VIBRAMYCIN   Dose:  100 mg   Indication:  Community Acquired Pneumonia   Quantity:  8 capsule        Take 1 capsule (100 mg) by mouth every 12 hours for 4 days   Refills:  0        hydrocortisone 100 MG/60ML enema   Commonly known as:  CORTENEMA   Quantity:  24 enema        1 enema every night for 21 days; then 1 enema every other night for 1 week and then stop   Refills:  0        HYDROmorphone 4 MG tablet   Commonly known as:  DILAUDID   Dose:  4-6 mg   Quantity:  30 tablet        Take 1-1.5 tablets (4-6 mg) by mouth every 3 hours as needed for moderate to severe pain   Refills:  0        LORazepam 0.5 MG tablet   Commonly known as:  ATIVAN   Dose:  0.5-1 mg   Quantity:  40 tablet        Take 1-2 tablets (0.5-1 mg) by mouth every 6 hours as needed for anxiety or other (nausea)   Refills:  0        mesalamine 1.2 g EC tablet   Commonly known as:   "LIALDA   Dose:  4800 mg   Quantity:  120 tablet        Take 4 tablets (4,800 mg) by mouth every morning   Refills:  3        * order for DME   Quantity:  1 Units        Equipment being ordered: 20-30mmHg compression sleeve and 20-30mmHg compression glove\" x2 pair   Refills:  0        * order for DME   Quantity:  1 each        Equipment being ordered: x2 Wearease compression shirt   Refills:  0        oseltamivir 75 MG capsule   Commonly known as:  TAMIFLU   Dose:  75 mg   Indication:  Treatment to Prevent Influenza   Quantity:  2 capsule        Take 1 capsule (75 mg) by mouth 2 times daily for 2 doses   Refills:  0        predniSONE 10 MG tablet   Commonly known as:  DELTASONE   Dose:  20 mg   Quantity:  100 tablet        Take 2 tablets (20 mg) by mouth daily   Refills:  1        promethazine 25 MG tablet   Commonly known as:  PHENERGAN   Dose:  25 mg   Quantity:  56 tablet        Take 1 tablet (25 mg) by mouth every 4 hours as needed for nausea or vomiting   Refills:  0        rOPINIRole 0.25 MG tablet   Commonly known as:  REQUIP   Dose:  0.25 mg   Quantity:  30 tablet        Take 1 tablet (0.25 mg) by mouth At Bedtime   Refills:  11        sulfamethoxazole-trimethoprim 400-80 MG per tablet   Commonly known as:  BACTRIM/SEPTRA   Dose:  1 tablet        Take 1 tablet by mouth daily While on high dose steroids (prednisone 20mg or greater)   Refills:  0        TYLENOL PO   Dose:  1000 mg        Take 1,000 mg by mouth every 8 hours as needed for mild pain or fever   Refills:  0        venlafaxine 150 MG Tb24 24 hr tablet   Commonly known as:  EFFEXOR-ER   Dose:  150 mg   Quantity:  90 each        Take 1 tablet (150 mg) by mouth daily (with breakfast)   Refills:  1        Vitamin D3 3000 units Tabs   Dose:  3000 Int'l Units   Quantity:  30 tablet        Take 3,000 Int'l Units by mouth daily   Refills:  3        * Notice:  This list has 2 medication(s) that are the same as other medications prescribed for you. Read the " directions carefully, and ask your doctor or other care provider to review them with you.            Orders Needing Specimen Collection     Ordered          05/09/18 2153  CBC with platelets differential - STAT, Prio: STAT, Needs to be Collected     Scheduled Task Status   05/09/18 2153 Collect CBC with platelets differential Open   Order Class:  PCU Collect                05/09/18 2153  Comprehensive metabolic panel - STAT, Prio: STAT, Needs to be Collected     Scheduled Task Status   05/09/18 2153 Collect Comprehensive metabolic panel Open   Order Class:  PCU Collect                05/09/18 2153  UA with Microscopic - STAT, Prio: STAT, Needs to be Collected     Scheduled Task Status   05/09/18 2153 Collect UA with Microscopic Open   Order Class:  PCU Collect                05/09/18 2153  Urine Culture Aerobic Bacterial - ROUTINE, Prio: Routine, Needs to be Collected     Scheduled Task Status   05/09/18 2153 Collect Urine Culture Aerobic Bacterial Open   Order Class:  PCU Collect                05/09/18 2153  Blood culture - STAT, Prio: STAT, Needs to be Collected     Scheduled Task Status   05/09/18 2153 Collect Blood culture Open   Order Class:  PCU Collect                05/09/18 2153  Blood culture - STAT, Prio: STAT, Needs to be Collected     Scheduled Task Status   05/09/18 2153 Collect Blood culture Open   Order Class:  PCU Collect                05/09/18 2153  Rapid Strep Screen - STAT, Prio: STAT, Needs to be Collected     Scheduled Task Status   05/09/18 2154 Collect Rapid Strep Screen Open   Order Class:  PCU Collect                05/09/18 2154  Lipase - STAT, Prio: STAT, Needs to be Collected     Scheduled Task Status   05/09/18 2155 Collect Lipase Open   Order Class:  PCU Collect                  Pending Results     No orders found from 5/7/2018 to 5/10/2018.            Pending Culture Results     No orders found from 5/7/2018 to 5/10/2018.            Pending Results Instructions     If you had any  lab results that were not finalized at the time of your Discharge, you can call the ED Lab Result RN at [phone]. You will be contacted by this team for any positive Lab results or changes in treatment. The nurses are available 7 days a week from 10A to 6:30P.  You can leave a message 24 hours per day and they will return your call.        Test Results From Your Hospital Stay               Thank you for choosing [facility]       Thank you for choosing [facility] for your care. Our goal is always to provide you with excellent care. Hearing back from our patients is one way we can continue to improve our services. Please take a few minutes to complete the written survey that you may receive in the mail after you visit with us. Thank you!        [employer] Information     [facility] gives you secure access to your electronic health record. If you see a primary care provider, you can also send messages to your care team and make appointments. If you have questions, please call your primary care clinic.  If you do not have a primary care provider, please call [phone] and they will assist you.        Care EveryWhere ID     This is your Care EveryWhere ID. This could be used by other organizations to access your [facility] medical records  [id number]        Equal Access to Services     BRIGETTE QUARLES AH: Hadii josy Gustafson, melba valencia, paulina perez, pancho castillo. [facility] [phone].    ATENCIÓN: Si habla español, tiene a mcdonnell disposición servicios gratuitos de asistencia lingüística. Llame al [phone].    We comply with applicable federal civil rights laws and [place] laws. We do not discriminate on the basis of race, color, national origin, age, disability, sex, sexual orientation, or gender identity.            After Visit Summary       This is your record. Keep this with you and show to your community pharmacist(s) and doctor(s) at your next visit.

## 2018-05-09 NOTE — PLAN OF CARE
Problem: Patient Care Overview  Goal: Plan of Care/Patient Progress Review  Outcome: Improving  Pt teary upon awakening, c/o HA - gave Tylenol and prn Oxycodone. Abd discomfort has moved from epigastric to lower abd and is improving, HA improved w/in approx. 30 min. And pt requesting a little solid food, gave pudding and cookies which she tolerated well. Diet advanced to regular per her request for breakfast. Enc. Pt to continue maintenance laxatives while taking pain meds.

## 2018-05-09 NOTE — ED AVS SNAPSHOT
[facility] Emergency Department    [address] [address]    Phone:  [phone]    Fax:  [phone]                                       Doretha Fernandez   MRN: [id number]    Department:  [facility] Emergency Department   Date of Visit:  5/9/2018           After Visit Summary Signature Page     I have received my discharge instructions, and my questions have been answered. I have discussed any challenges I see with this plan with the nurse or doctor.    ..........................................................................................................................................  Patient/Patient Representative Signature      ..........................................................................................................................................  Patient Representative Print Name and Relationship to Patient    ..................................................               ................................................  Date                                            Time    ..........................................................................................................................................  Reviewed by Signature/Title    ...................................................              ..............................................  Date                                                            Time

## 2018-05-09 NOTE — PLAN OF CARE
"Problem: Patient Care Overview  Goal: Plan of Care/Patient Progress Review  Outcome: Declining  Patient did not have stools for this writer  Patient very weepy this shift  Changed bedding x1 D/T patient was diaphoretic  Patient tolerated the rest of bolus after midline placed     Problem: Pain, Acute (Adult)  Goal: Acceptable Pain Control/Comfort Level  Patient will demonstrate the desired outcomes by discharge/transition of care.   Outcome: No Change  Patient continues to have generalized pain  Oral Dilaudid administered per patient request  Ativan administered per increased anxiety/\"I am so up set tonite.\"    Problem: Pneumonia (Adult)  Goal: Signs and Symptoms of Listed Potential Problems Will be Absent, Minimized or Managed (Pneumonia)  Signs and symptoms of listed potential problems will be absent, minimized or managed by discharge/transition of care (reference Pneumonia (Adult) CPG).   Outcome: Improving  Lungs sounds through out clear bilat.      "

## 2018-05-09 NOTE — PROGRESS NOTES
Outpatient Physical Therapy Progress Note     Patient: Doretha Fernandez  : 1975    Beginning/End Dates of Reporting Period:  2018 to 2018    Referring Provider: Jessica Castellano PA-C    Therapy Diagnosis: LUE and L-breast lymphedema; deconditioning      Client Self Report:  have appt at the [facility] tomorrow to see a GI specialist and Oncologist    Objective Measurements:  Objective Measure: girth (last taken on 18)  Details: L UE +2.3%      Outcome Measures (most recent score):   LLIS = 41 on date of initial evaluation; pt will again complete LLIS at time of discharge     Goals:  Goal Identifier stg   Goal Description pt to increase her 6MWT distance to 1400 feet to allow pt to continue living independently at home without AD   Target Date 18   Date Met      Progress:     Goal Identifier stg   Goal Description pt to increase FACIT score by 3-5 points to assist pt in returning to work and independent level of living   Target Date 18   Date Met      Progress:     Goal Identifier ltg   Goal Description pt to have increased B hip flex and B knee flex/ext to 4+/5 to assist with returning back to work fulltime and exercising    Target Date 18   Date Met      Progress:     Goal Identifier ltg   Goal Description pt to be independent with LUE lymphedema management via HEP, elevation, compression garment wear and self-MLD   Target Date 18   Date Met      Progress:     Goal Identifier ltg   Goal Description pt to have at least 8 point improvement on LLIS due to decreased lymphedema and increased comfort and ROM in LUE   Target Date 18   Date Met      Progress:       Progress Toward Goals:   Progress limited due to medical status. Over past month patient was admitted at [facility] and per chart review was admitted to [facility] on 18 for sepsis due to positive for InfluenzaB.       Plan:  Continue therapy per current plan of care.    Discharge:  No

## 2018-05-10 ENCOUNTER — PATIENT OUTREACH (OUTPATIENT)
Dept: CARE COORDINATION | Facility: CLINIC | Age: 43
End: 2018-05-10

## 2018-05-10 ENCOUNTER — TELEPHONE (OUTPATIENT)
Dept: ONCOLOGY | Facility: CLINIC | Age: 43
End: 2018-05-10

## 2018-05-10 ENCOUNTER — TRANSFERRED RECORDS (OUTPATIENT)
Dept: HEALTH INFORMATION MANAGEMENT | Facility: CLINIC | Age: 43
End: 2018-05-10

## 2018-05-10 DIAGNOSIS — C43.62 MALIGNANT MELANOMA OF LEFT UPPER EXTREMITY INCLUDING SHOULDER (H): Primary | ICD-10-CM

## 2018-05-10 NOTE — LETTER
Health Care Home - Access Care Plan    About Me  Patient Name:  Doretha Fernandez    YOB: 1975  Age:                             43 year old   [facility] MRN:            [id number] Telephone Information:     Home Phone [phone]   Mobile Not on file.       Address:    [address] [address] Email address:  [email or web address]      Emergency Contact(s)  Name Relationship Lgl Grd Work Phone Home Phone Mobile Phone   1. JUDY GRANT Step parent   [phone]    2. MEGAN MILLS Father  none none none             Health Maintenance:      My Access Plan  Medical Emergency 911   Questions or concerns during clinic hours Primary Clinic Line, I will call the clinic directly: [facility] - [phone]   24 Hour Appointment Line [phone] or  [phone]) (toll free)   24 Hour Nurse Line [phone] (toll free)   Questions or concerns outside clinic hours 24 Hour Appointment Line, I will call the after-hours on-call line:   [facility] [phone] or [phone]) (toll-free)   Preferred Urgent Care [facility] [phone]   Preferred Hospital [place], [place]  [phone]   Preferred Pharmacy [facility] [place] [address] [facility]     Behavioral Health Crisis Line The National Suicide Prevention Lifeline at 1-797.361.6901 or 911     My Care Team Members  Patient Care Team       Relationship Specialty Notifications Start End    Anna Naidu MD PCP - General Family Practice  7/28/11     Phone: [phone] Pager: [phone] Fax: [phone] [address] [facility] [address]    Bairon Miranda MD MD Neurology  5/17/17     Phone: [phone] Fax: [phone]         [facility] [address] [address]    Cheo Norton MD MD Ophthalmology  8/24/17     Phone: [phone] Fax: [phone] [address]  [facility] [address]    Laura Garcia, RN Nurse Coordinator Surgery Surgical Oncology Admissions 10/13/17     Phone: [phone] Pager: [phone]        Jeanie Keenan, RN Case Manager Hematology & Oncology Admissions 11/17/17     Kathy Richards MD MD Hematology & Oncology Abnormal results only, Admissions 11/17/17     Phone: [phone] Fax: [phone]         [facility] [facility] [address]    Antonia Nair MD MD Radiation Oncology  3/20/18     Phone: [phone] [address] [facility]  [address] [facility] [address]           My Medical and Care Information  Problem List   Patient Active Problem List   Diagnosis     CARDIOVASCULAR SCREENING; LDL GOAL LESS THAN 160     DUB (dysfunctional uterine bleeding)     Anxiety state     Esophageal reflux     Mild major depression (H)     Mild intermittent asthma without complication     Vision changes     Prothrombin mutation (H)     Metastatic malignant melanoma (H)     Malignant melanoma of left upper extremity including shoulder (H)     Melanoma (H)     Functional diarrhea     Colitis     Rectal bleeding     Diarrhea     Intestinal giardiasis     Bilateral leg cramps     Rash and nonspecific skin eruption     Influenza-like illness     Other chronic pain      Current Medications and Allergies:  See printed Medication Report

## 2018-05-10 NOTE — TELEPHONE ENCOUNTER
Status Check:    Pt is a 43 year old female, recently scheduled to see Dr. Richards 05.02.18 in f/u of melanoma, but was in hospital and seen in ED. Has since been discharged, but not scheduled for f/u with Dr. Richards. Call placed for status check and to set up appt. No answer. Message left.    Primary care provider is: Anna Naidu    Diagnosis:   Encounter Diagnosis   Name Primary?     Malignant melanoma of left upper extremity including shoulder (H) Yes     Oncology provider is:  Dr. AMANDA Richards    How are you doing/feeling?: unknown.  Any further issues?: unknown  Next Follow up/Recommendations: Message left for patient to return call to clinic for status check and to set up f/u appointment with Dr. Richards.  Direct line provided.    Patient instructed to call with any questions or concerns.  Patient states understanding and is in agreement with this plan.    Approximately 0 minutes spent on telephone with patient reviewing assessment and symptom(s) and providing symptom management.    Jeanie Keenan RN, BSN, OCN  Oncology Hematology   Breast Cancer Navigator  [facility]  [email or web address]  [phone]

## 2018-05-10 NOTE — ED NOTES
Pt refused all IV/bloodwork. MD put discharge paperwork in and RN went in to discharge her once she was off her phone. Pt refused d/c vital signs and did not want to go over discharge paperwork. Reviewed instructions to take Tylenol 500mg every 4 hours as stated by Dr. Swanson. Pt left with friend.

## 2018-05-10 NOTE — PROGRESS NOTES
Clinic Care Coordination Contact  [facility]/Voicemail    Referral Source: IP Handoff  Clinical Data: Care Coordinator Outreach  Outreach attempted x 1.  Left message on voicemail with call back information and requested return call.  Plan: Care Coordinator will mail out care coordination introduction letter with care coordinator contact information and explanation of care coordination services. Care Coordinator will try to reach patient again in 1-2 business days.  Сергей Mott RN  Clinic Care Coordinator  [phone] or [phone]

## 2018-05-10 NOTE — LETTER
[facility] CARE COORDINATION  [facility]  [address]  [facility] [address]  Phone: [phone]    May 10, 2018    Doretha Fernandez  [address] [place] [address]      Dear Doretha,    I am a clinic care coordinator who works with Anna Naidu MD at [facility]. I recently tried to call and was unable to reach you. I wanted to introduce myself and provide you with my contact information so that you can call me with questions or concerns about your health care. Below is a description of clinic care coordination and how I can further assist you.     The clinic care coordinator is a registered nurse and/or  who understand the health care system. The goal of clinic care coordination is to help you manage your health and improve access to the [facility] system in the most efficient manner. The registered nurse can assist you in meeting your health care goals by providing education, coordinating services, and strengthening the communication among your providers. The  can assist you with financial, behavioral, psychosocial, chemical dependency, counseling, and/or psychiatric resources.    Please feel free to contact me at [phone], [phone], with any questions or concerns. We at [facility] are focused on providing you with the highest-quality healthcare experience possible and that all starts with you.     Sincerely,     Сергей Mott RN  Clinic Care Coordinator    Enclosed: I have enclosed a copy of a 24 Hour Access Plan. This has helpful phone numbers for you to call when needed. Please keep this in an easy to access place to use as needed.

## 2018-05-10 NOTE — ED PROVIDER NOTES
History     Chief Complaint   Patient presents with     Fever     was admitted to hospital for influenza and pneumonia, was DC'd today, still running fevers, no improvement in s/s since admission to hospital. had APAP at 1900, took 500 mg     HPI  Doretha Fernandez is a 43 year old female with medical history which includes dysfunctional uterine bleeding, GERD, anxiety, depression, chronic pain, and metastatic melanoma status post chemotherapy and believed to be in remission presenting for complaint of generalized body aches and fever.  Records confirm that the patient was discharged earlier this afternoon from [facility] after several day admission for influenza-like illness versus pneumonia.  She states that shortly after returning home she developed a fever and generalized body aches, does not feel any better after her admission and is concerned that she was instructed to take Tylenol only as frequent as every 12 hours.  She continues to have an epigastric fullness sensation, mild, but persistent for several days and she believes related to chemotherapeutic agent Nivolumab induced colitis per [facility].  She is taking 60 mg of oral prednisone for treatment.  Patient denies headache, sore throat, cough, shortness of breath, chest pain, back pain, genitourinary symptoms, rash or skin lesions.  She had a typical nonbloody bowel movement today.  No known exacerbating or alleviating factors.  Patient is clearly frustrated with the diagnosis she was given prior to discharge.    Problem List:    Patient Active Problem List    Diagnosis Date Noted     Influenza-like illness 05/06/2018     Priority: Medium     Other chronic pain 05/06/2018     Priority: Medium     Rash and nonspecific skin eruption 04/05/2018     Priority: Medium     Bilateral leg cramps 03/07/2018     Priority: Medium     Intestinal giardiasis 03/05/2018     Priority: Medium     Rectal bleeding 03/04/2018     Priority: Medium     Diarrhea 03/04/2018      Priority: Medium     Colitis 2018     Priority: Medium     Functional diarrhea 2018     Priority: Medium     Melanoma (H) 10/23/2017     Priority: Medium     Malignant melanoma of left upper extremity including shoulder (H) 10/12/2017     Priority: Medium     Metastatic malignant melanoma (H) 10/10/2017     Priority: Medium     Prothrombin mutation (H) 2017     Priority: Medium     On daily aspirin 81 mg per hematology's recommendations from        Vision changes 2017     Priority: Medium     Mild intermittent asthma without complication 2013     Priority: Medium     Mild major depression (H) 2013     Priority: Medium     Anxiety state 2012     Priority: Medium     Problem list name updated by automated process. Provider to review       Esophageal reflux 2012     Priority: Medium     DUB (dysfunctional uterine bleeding) 2011     Priority: Medium     CARDIOVASCULAR SCREENING; LDL GOAL LESS THAN 160 2011     Priority: Low        Past Medical History:    Past Medical History:   Diagnosis Date     Abnormal MRI      Anxiety      Basal cell carcinoma      Depression      Lumbago      Malignant melanoma (H)      Mild persistent asthma      Prothrombin deficiency (H)      Stroke (cerebrum) (H)      TIA (transient ischemic attack)        Past Surgical History:    Past Surgical History:   Procedure Laterality Date     COLONOSCOPY N/A 10/18/2017    Procedure: COLONOSCOPY;  Colon;  Surgeon: Debbie Stephens MD;  [facility]: [facility]     COLONOSCOPY N/A 3/9/2018    Procedure: COMBINED COLONOSCOPY, SINGLE OR MULTIPLE BIOPSY/POLYPECTOMY BY BIOPSY;  colon  ;  Surgeon: Benita Schumacher MD;  Location: [facility] GI     DISSECT LYMPH NODE AXILLA Left 10/23/2017    Procedure: DISSECT LYMPH NODE AXILLA;  Left Axillary Lymph Node Dissection ;  Surgeon: Laurent Cool MD;  Location: [facility] OR     GYN SURGERY           REPAIR MOHS Left 2017    Procedure: REPAIR MOHS;   Left Upper Lid Moh's Reconstruction;  Surgeon: Kisha Bosch MD;  Location: [facility] OR       Family History:    Family History   Problem Relation Age of Onset     CANCER Mother 45     lung     Neurologic Disorder Mother      Lipids Father      GASTROINTESTINAL DISEASE Father      Depression Father      CANCER Maternal Grandmother      Blood Disease Maternal Grandmother      Arthritis Maternal Grandmother      DIABETES Maternal Grandmother      Depression Maternal Grandmother      Macular Degeneration Maternal Grandmother      Glaucoma Maternal Grandmother      DIABETES Maternal Grandfather      CEREBROVASCULAR DISEASE Maternal Grandfather      Blood Disease Maternal Grandfather      HEART DISEASE Maternal Grandfather      Glaucoma Maternal Grandfather      CANCER Paternal Grandmother      Cancer - colorectal Paternal Grandmother      Respiratory Paternal Grandfather      Blood Disease Paternal Grandfather      HEART DISEASE Daughter      Asthma Daughter      Depression Sister        Social History:  Marital Status:   [4]  Social History   Substance Use Topics     Smoking status: Passive Smoke Exposure - Never Smoker     Last attempt to quit: 3/20/1998     Smokeless tobacco: Never Used     Alcohol use No      Comment: occ        Medications:      Acetaminophen (TYLENOL PO)   Acidophilus Lactobacillus CAPS   [START ON 5/10/2018] amoxicillin-clavulanate (AUGMENTIN) 875-125 MG per tablet   Cholecalciferol (VITAMIN D3) 3000 units TABS   doxycycline (VIBRAMYCIN) 100 MG capsule   hydrocortisone (CORTENEMA) 100 MG/60ML enema   HYDROmorphone (DILAUDID) 4 MG tablet   LORazepam (ATIVAN) 0.5 MG tablet   mesalamine (LIALDA) 1.2 g EC tablet   order for DME   order for DME   oseltamivir (TAMIFLU) 75 MG capsule   predniSONE (DELTASONE) 10 MG tablet   promethazine (PHENERGAN) 25 MG tablet   rOPINIRole (REQUIP) 0.25 MG tablet   sulfamethoxazole-trimethoprim (BACTRIM/SEPTRA) 400-80 MG per tablet   venlafaxine (EFFEXOR-ER) 150  "MG TB24 24 hr tablet         Review of Systems  Constitutional: Positive for fever..  HENT:  Negative oral or throat pain.  Cardiovascular:  Negative for chest pain.  Respiratory:  Negative for cough or shortness of breath.  Gastrointestinal:  Positive for epigastric abdominal fullness.  Negative for vomiting, diarrhea, constipation, or blood with bowel movements.  Genitourinary:  Negative for dysuria or hematuria.  Neurological:  Negative for headache.  Skin:  Negative for rash.    All others reviewed and are negative.      Physical Exam   BP: 112/79  Pulse: 107  Temp: 100.6  F (38.1  C)  Resp: 18  Height: 160 cm (5' 3\")  Weight: 93.4 kg (206 lb)  SpO2: 97 %      Physical Exam  Constitutional:  Well developed, well nourished.  Appears anxious but nontoxic.  HENT:  Normocephalic and atraumatic.  Symmetric in appearance.  Eyes:  Conjunctivae are normal.  Neck:  Neck supple.  Cardiovascular:  No cyanosis.  Borderline tachycardia with regular rhythm.  No audible murmurs noted.  Respiratory:  Effort normal, no respiratory distress.  CTAB without diminished regions.  No wheezing, rhonchi, or crackles.  Gastrointestinal:  Soft, nondistended abdomen.  Nontender and without guarding.  No rigidity or rebound tenderness.  Negative Mandel's sign.  Negative McBurney's point.    Genitourinary:  Noncontributory.  Musculoskeletal:  Moves extremities spontaneously.  Neurological:  Patient is alert.  Skin:  Skin is warm and dry.  Psychiatric:  Normal mood and affect.      ED Course     ED Course     Procedures               Critical Care time:  none                   Assessments & Plan (with Medical Decision Making)   Doretha Fernandez is a 43 year old female who presents to the department for evaluation of fever with generalized body aches, no focal joint pain or swelling.  She has multiple comorbidities including chemotherapy-induced colitis and chronic prednisone.  In reviewing her discharge summary, it appears as though she had an " "influenza-like illness with rapid influenza and PCR testing negative but was exposed to her daughter whom had confirmed influenza B prior to onset of patient's symptoms.  There was also some question as to whether she could have had pneumonia given his subtle radiographic finding and elevated pro-calcitonin level.  She is continuing to take oral antibiotics but has not been taking acetaminophen routinely as she believes she was instructed to take 1 g every 12 hours.  Previous blood cultures had not grown out and CT of abdomen/pelvis without acute findings on 5/5/18.  I informed the patient that repeating influenza testing is unlikely to be useful but she could still have influenza despite her previous negative testing as it is not 100% sensitive.  I recommended obtaining blood for labs, cultures, and also urinalysis before discussing imaging the patient has grown frustrated stating that she has already had these tests performed.  She wants to know exactly what is causing her symptoms and \"how to fix it\" and does not feel that her previous hospital stay provided any clarity.  She has refused peripheral IV stating she has no veins left.  She eventually refused diagnostic workup or therapies and requested to simply be discharged back home.  I tried to sit back down with her and reassure her that we would do our best to come up with a diagnosis and treatment plan which he has again declined and demands to be discharged immediately.  She refused to sign discharge paperwork or discuss treatment options.  I advised her that she may return at any time for further evaluation/treatment, and I would strongly recommend it at her earliest convenience.  Discussions were had in the presence of a friend.      Disclaimer:  This note consists of symbols derived from keyboarding, dictation, and/or voice recognition software.  As a result, there may be errors in the script that have gone undetected.  Please consider this when " interpreting information found in the chart.        I have reviewed the nursing notes.    I have reviewed the findings, diagnosis, plan and need for follow up with the patient.       Discharge Medication List as of 5/9/2018 10:06 PM          Final diagnoses:   Fever, unspecified fever cause   Influenza-like illness   Myalgia       5/9/2018   [facility] EMERGENCY DEPARTMENT     Mauro Swanson DO  05/09/18 0125

## 2018-05-10 NOTE — TELEPHONE ENCOUNTER
"Patient called crying and extremely upset reporting she was discharged from [facility] today after being admitted since 5/5/18. Patient reports she was admitted for pneumonia and also has colitis. Patient reports still having a lot of pain (all over body, especially abdomen), 102 fever, and shortness of breath. Patient was able to speak in short phrases but was also sobbing during the call. Patient reports, \"I just don't know what to do, I feel awful and I do not know why I was discharged from the hospital.\" Reviewed guidelines below and advised patient to go to the ER and have someone drive her. Patient reported she also has a care team at [place] for her metastatic malignant melanoma and does not know if she should go there or go back to [facility]. Patient reported she called the care team at [facility] and they recommended she go down to [facility] either tonight or tomorrow. Patient reports she does not have anyone to drive her down to [facility]. Advised patient to go to [facility] and then if possible go down to [place] tomorrow when she has someone to drive her. This writer asked patient if she has some one who can drive her to the ER now and she reported she does. Patient reported she will go to the ER. RN advised to call back with any changes, worsening of symptoms, and questions or concerns.     Reason for Disposition    [1] MODERATE difficulty breathing (e.g., speaks in phrases, SOB even at rest, pulse 100-120) AND [2] NEW-onset or WORSE than normal    Additional Information    Negative: [1] Breathing stopped AND [2] hasn't returned    Negative: Choking on something    Negative: Severe difficulty breathing (e.g., struggling for each breath, speaks in single words)    Negative: Bluish lips, tongue, or face now    Negative: Difficult to awaken or acting confused  (e.g., disoriented, slurred speech)    Negative: Passed out (i.e., lost consciousness, collapsed and was not responding)    " Negative: Wheezing started suddenly after medicine, an allergic food or bee sting    Negative: Stridor    Negative: Slow, shallow and weak breathing    Negative: Sounds like a life-threatening emergency to the triager    Negative: Chest pain    Negative: [1] Wheezing (high pitched whistling sound) AND [2] previous asthma attacks or use of asthma medicines    Negative: [1] Difficulty breathing AND [2] only present when coughing    Negative: [1] Difficulty breathing AND [2] only from stuffy or runny nose    Protocols used: BREATHING DIFFICULTY-ADULT-AH    Joanna Harp RN/[facility]

## 2018-05-11 ENCOUNTER — TRANSFERRED RECORDS (OUTPATIENT)
Dept: HEALTH INFORMATION MANAGEMENT | Facility: CLINIC | Age: 43
End: 2018-05-11

## 2018-05-11 NOTE — PROGRESS NOTES
Oncology CC follow up made.  No further Clinic Care Coordination follow up with be made.     Сергей Mott RN  Clinic Care Coordinator  [phone] or [phone]

## 2018-05-12 LAB
BACTERIA SPEC CULT: NO GROWTH
Lab: NORMAL
SPECIMEN SOURCE: NORMAL

## 2018-05-18 ENCOUNTER — TELEPHONE (OUTPATIENT)
Dept: GASTROENTEROLOGY | Facility: CLINIC | Age: 43
End: 2018-05-18

## 2018-05-18 NOTE — TELEPHONE ENCOUNTER
I spoke with the patient to confirm her appointment, patient stated that she is in  hospital at [place] and need to cancel her appointment .

## 2018-05-19 ENCOUNTER — TRANSFERRED RECORDS (OUTPATIENT)
Dept: HEALTH INFORMATION MANAGEMENT | Facility: CLINIC | Age: 43
End: 2018-05-19

## 2018-06-04 ENCOUNTER — TELEPHONE (OUTPATIENT)
Dept: DERMATOLOGY | Facility: CLINIC | Age: 43
End: 2018-06-04

## 2018-06-04 NOTE — TELEPHONE ENCOUNTER
"Reason for Call:  Other appointment    Detailed comments: pt calling is due for 3 month check for melanoma.  Was told next available is July.  Pt's wants to be seen sooner - is very anxious.  \"I'm still in treatment so would really prefer to get in as soon as possible.\"  Offered to schedule next available and put pt on waitlist - pt didn't want this - wants sooner appt.    Phone Number Patient can be reached at: Home number on file [phone] (home)    Best Time: any    Can we leave a detailed message on this number? YES    Call taken on 6/4/2018 at 9:44 AM by Lissett Gates      "

## 2018-06-04 NOTE — TELEPHONE ENCOUNTER
"Spoke to patient who doesn't have specific concerns at this time, but is being treated for metastatic melanoma and states: \"I am just paranoid and want to be checked..\"    I did give her a work in appt next week and she accepted that appointment.    Lenka Michele RN    "

## 2018-06-12 ENCOUNTER — OFFICE VISIT (OUTPATIENT)
Dept: DERMATOLOGY | Facility: CLINIC | Age: 43
End: 2018-06-12
Payer: COMMERCIAL

## 2018-06-12 ENCOUNTER — TELEPHONE (OUTPATIENT)
Dept: FAMILY MEDICINE | Facility: CLINIC | Age: 43
End: 2018-06-12

## 2018-06-12 VITALS — DIASTOLIC BLOOD PRESSURE: 72 MMHG | SYSTOLIC BLOOD PRESSURE: 113 MMHG | HEART RATE: 84 BPM | OXYGEN SATURATION: 96 %

## 2018-06-12 DIAGNOSIS — L81.4 LENTIGO: ICD-10-CM

## 2018-06-12 DIAGNOSIS — L82.1 SK (SEBORRHEIC KERATOSIS): ICD-10-CM

## 2018-06-12 DIAGNOSIS — D23.9 DERMAL NEVUS: ICD-10-CM

## 2018-06-12 DIAGNOSIS — D18.00 ANGIOMA: ICD-10-CM

## 2018-06-12 DIAGNOSIS — C43.9 METASTATIC MELANOMA (H): Primary | ICD-10-CM

## 2018-06-12 PROCEDURE — 99213 OFFICE O/P EST LOW 20 MIN: CPT | Performed by: DERMATOLOGY

## 2018-06-12 NOTE — MR AVS SNAPSHOT
After Visit Summary   6/12/2018    Doretha Fernandez    MRN: [id number]           Patient Information     Date Of Birth          1975        Visit Information        Provider Department      6/12/2018 11:30 AM Lowell Bullock MD [facility]        Today's Diagnoses     Metastatic melanoma (H)    -  1    Lentigo        SK (seborrheic keratosis)        Angioma        Dermal nevus           Follow-ups after your visit        Your next 10 appointments already scheduled     Jun 19, 2018  9:15 AM CDT   Lymphedema Evaluation with Anna Graham PT   [facility] ([facility])    [address] [place] [id number] [phone]              Who to contact     If you have questions or need follow up information about today's clinic visit or your schedule please contact [facility] directly at [phone].  Normal or non-critical lab and imaging results will be communicated to you by MyChart, letter or phone within 4 business days after the clinic has received the results. If you do not hear from us within 7 days, please contact the clinic through [employer] or phone. If you have a critical or abnormal lab result, we will notify you by phone as soon as possible.  Submit refill requests through [employer] or call your pharmacy and they will forward the refill request to us. Please allow 3 business days for your refill to be completed.          Additional Information About Your Visit        MyChart Information     [employer] gives you secure access to your electronic health record. If you see a primary care provider, you can also send messages to your care team and make appointments. If you have questions, please call your primary care clinic.  If you do not have a primary care provider, please call [phone] and they will assist you.        Care EveryWhere ID     This is your Care EveryWhere ID. This could be used by other organizations to  access your [place] medical records  [id number]        Your Vitals Were     Pulse Pulse Oximetry                84 96%           Blood Pressure from Last 3 Encounters:   06/12/18 113/72   05/09/18 112/79   05/09/18 108/49    Weight from Last 3 Encounters:   05/09/18 93.4 kg (206 lb)   05/09/18 96.9 kg (213 lb 10 oz)   04/13/18 97.8 kg (215 lb 9.6 oz)              Today, you had the following     No orders found for display         Today's Medication Changes          These changes are accurate as of 6/12/18 11:51 AM.  If you have any questions, ask your nurse or doctor.               These medicines have changed or have updated prescriptions.        Dose/Directions    predniSONE 10 MG tablet   Commonly known as:  DELTASONE   This may have changed:  when to take this   Used for:  Malignant melanoma of left upper extremity including shoulder (H), Colitis        Dose:  20 mg   Take 2 tablets (20 mg) by mouth daily   Quantity:  100 tablet   Refills:  1                Primary Care Provider Office Phone # Fax #    Anan Naidu [phone] [id number] [facility] [address] [id number]        Equal Access to Services     [facility]: Hadii josy Gustafson, waaxda lujoni, qaybta kaalmada chris, pancho moore . So [facility] [phone].    ATENCIÓN: Si habla español, tiene a mcdonnell disposición servicios gratuitos de asistencia lingüística. Llame al [phone].    We comply with applicable federal civil rights laws and [place] laws. We do not discriminate on the basis of race, color, national origin, age, disability, sex, sexual orientation, or gender identity.            Thank you!     Thank you for choosing [facility]  for your care. Our goal is always to provide you with excellent care. Hearing back from our patients is one way we can continue to improve our services. Please take a few minutes to complete the written survey that you may receive in  "the mail after your visit with us. Thank you!             Your Updated Medication List - Protect others around you: Learn how to safely use, store and throw away your medicines at www.disposemymeds.org.          This list is accurate as of 6/12/18 11:51 AM.  Always use your most recent med list.                   Brand Name Dispense Instructions for use Diagnosis    HYDROmorphone 4 MG tablet    DILAUDID    30 tablet    Take 1-1.5 tablets (4-6 mg) by mouth every 3 hours as needed for moderate to severe pain    Proctocolitis       LORazepam 0.5 MG tablet    ATIVAN    40 tablet    Take 1-2 tablets (0.5-1 mg) by mouth every 6 hours as needed for anxiety or other (nausea)    Proctocolitis, Anxiety state, Metastatic malignant melanoma (H)       * order for DME     1 Units    Equipment being ordered: 20-30mmHg compression sleeve and 20-30mmHg compression glove\" x2 pair    Lymphedema of left arm       * order for DME     1 each    Equipment being ordered: x2 Wearease compression shirt    Secondary lymphedema       predniSONE 10 MG tablet    DELTASONE    100 tablet    Take 2 tablets (20 mg) by mouth daily    Malignant melanoma of left upper extremity including shoulder (H), Colitis       promethazine 25 MG tablet    PHENERGAN    56 tablet    Take 1 tablet (25 mg) by mouth every 4 hours as needed for nausea or vomiting    Proctocolitis       rOPINIRole 0.25 MG tablet    REQUIP    30 tablet    Take 1 tablet (0.25 mg) by mouth At Bedtime    Restless leg syndrome       sulfamethoxazole-trimethoprim 400-80 MG per tablet    BACTRIM/SEPTRA     Take 1 tablet by mouth daily While on high dose steroids (prednisone 20mg or greater)        TYLENOL PO      Take 1,000 mg by mouth every 8 hours as needed for mild pain or fever        venlafaxine 150 MG Tb24 24 hr tablet    EFFEXOR-ER    90 each    Take 1 tablet (150 mg) by mouth daily (with breakfast)    Mild major depression (H)       Vitamin D3 3000 units Tabs     30 tablet    Take 3,000 " Int'l Units by mouth daily    Vitamin D deficiency       * Notice:  This list has 2 medication(s) that are the same as other medications prescribed for you. Read the directions carefully, and ask your doctor or other care provider to review them with you.

## 2018-06-12 NOTE — ADDENDUM NOTE
Encounter addended by: Anna Graham, PT on: 6/12/2018  9:52 AM<BR>     Actions taken: Sign clinical note, Episode resolved

## 2018-06-12 NOTE — PROGRESS NOTES
Doretha Fernandez is a 43 year old year old female patient here today for f/u h xof melanoma unknown primary.  She went to Dr. Cool for node dissection.1 out of 20 nodes positive.  No adjuvant Radiation given no survvial advantage.   MRI of brain and PET scan both clear.  Was on  Nivolumab got 8 injection but got colitis.  She went to [facility].   She was treated with high dose steroids for colitis and had a dx of bacterial pneumonia.   She was admitted for to hospital for this. She is followed by GI and is on pred taper.  She was started on Entyvio.  The goal would be to restart Nivo once her bowel is better.   She denies any new or changing skin lesions.  Patient reports the following modifying factors none.  Associated symptoms: none.  Patient has no other skin complaints today.  Remainder of the HPI, Meds, PMH, Allergies, FH, and SH was reviewed in chart.    Pertinent Hx:                Melanoma metastatic     Past Medical History            Past Medical History:   Diagnosis Date     Abnormal MRI         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      Anxiety        Depression        Lumbago         left lower back pain     Malignant melanoma (H)        Mild persistent asthma        Prothrombin deficiency (H)         takes 81mg asa daily     Stroke (cerebrum) (H)       During      TIA (transient ischemic attack)                  Past Surgical History              Past Surgical History:   Procedure Laterality Date     COLONOSCOPY N/A 10/18/2017      Procedure: COLONOSCOPY;  Colon;  Surgeon: Debbie Stephens MD;  [facility]     DISSECT LYMPH NODE AXILLA Left 10/23/2017      Procedure: DISSECT LYMPH NODE AXILLA;  Left Axillary Lymph Node Dissection ;  Surgeon: Laurent Cool MD;  [facility] OR     GYN SURGERY                 REPAIR MOHS Left 2017      Procedure: REPAIR MOHS;  Left Upper Lid Moh's Reconstruction;  Surgeon: Kisha Bosch MD;  [facility]                 Family History                Family History   Problem Relation Age of Onset     CANCER Mother 45         lung     Neurologic Disorder Mother        Lipids Father        GASTROINTESTINAL DISEASE Father        Depression Father        CANCER Maternal Grandmother        Blood Disease Maternal Grandmother        Arthritis Maternal Grandmother        DIABETES Maternal Grandmother        Depression Maternal Grandmother        Macular Degeneration Maternal Grandmother        Glaucoma Maternal Grandmother        DIABETES Maternal Grandfather        CEREBROVASCULAR DISEASE Maternal Grandfather        Blood Disease Maternal Grandfather        HEART DISEASE Maternal Grandfather        Glaucoma Maternal Grandfather        CANCER Paternal Grandmother        Cancer - colorectal Paternal Grandmother        Respiratory Paternal Grandfather        Blood Disease Paternal Grandfather        HEART DISEASE Daughter        Asthma Daughter        Depression Sister                    Social History    Social History                 Social History     Marital status:          Spouse name: N/A     Number of children: N/A     Years of education: N/A             Occupational History     Not on file.                   Social History Main Topics     Smoking status: Passive Smoke Exposure - Never Smoker         Last attempt to quit: 3/20/1998     Smokeless tobacco: Never Used     Alcohol use No            Comment: occ     Drug use: No     Sexual activity: Yes         Partners: Male         Birth control/ protection: Surgical               Other Topics Concern     Parent/Sibling W/ Cabg, Mi Or Angioplasty Before 65f 55m? No             Social History Narrative      20 y.o- patient's mother   of lung cancer. She had to take care of her younger sister.      2012- patient's  had a heart attack with stents placed, followed by cardiac rehabilitation      2000 TO 2012  was in [facility]  "psychiatric hospital for depression      January 2013 patient's  went through alcohol rehabilitation at [facility] inpatient                      They have attended couple counseling a couple of times and patient went to the family program for chemical dependency.      Patient denies alcohol or drug use and herself                      Has 2 children, girls ages 5 and 8      [place] [employer] and also works for the [employer]. 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                                Encounter Medications           Outpatient Encounter Prescriptions as of 1/9/2018   Medication Sig Dispense Refill     venlafaxine (EFFEXOR-ER) 150 MG TB24 24 hr tablet Take 1 tablet (150 mg) by mouth daily (with breakfast) 30 each 0     order for DME Equipment being ordered: 20-30mmHg compression sleeve and 20-30mmHg compression glove\" x2 pair 1 Units 0     rOPINIRole (REQUIP) 0.25 MG tablet Take 1 tablet (0.25 mg) by mouth At Bedtime 30 tablet 11     cephALEXin (KEFLEX) 500 MG capsule Take 2 capsules (1,000 mg) by mouth 2 times daily 24 capsule 0     oxyCODONE IR (ROXICODONE) 5 MG tablet Take 1-3 tablets (5-15 mg) by mouth every 4 hours as needed for pain maximum 12 tablet(s) per day 40 tablet 0     aspirin 81 MG EC tablet Take 81 mg by mouth daily           acetaminophen (TYLENOL) 325 MG tablet Take 2 tablets (650 mg) by mouth every 4 hours as needed for other (mild pain) 100 tablet 0     senna-docusate (SENOKOT-S;PERICOLACE) 8.6-50 MG per tablet Take 1 tablet by mouth 2 times daily as needed for constipation 60 tablet 1     LORazepam (ATIVAN) 1 MG tablet Take 1 tablet (1 mg) by mouth every 8 hours as needed for anxiety 30 tablet 0     Cholecalciferol (VITAMIN D3) 3000 UNITS TABS Take 3,000 Int'l Units by mouth daily 30 tablet 3     zonisamide (ZONEGRAN) 25 MG capsule Take 1 tablet (25 mg) once daily for 1 week, then 2 tablets once daily for 1 week, then 3 tablets once daily for 1 week, then 4 tablets " once daily for 1 week. 90 capsule 1                         Facility-Administered Encounter Medications as of 1/9/2018   Medication Dose Route Frequency Provider Last Rate Last Dose     lidocaine 1 % 9 mL  9 mL Intradermal Once nAna Naidu MD           sodium bicarbonate 8.4 % injection 1 mEq  1 mEq Intradermal Once Anna Naidu MD                                Review Of Systems  Skin: As above  Eyes: negative  Ears/Nose/Throat: negative  Respiratory: No shortness of breath, dyspnea on exertion, cough, or hemoptysis  Cardiovascular: negative  Gastrointestinal: negative  Genitourinary: negative  Musculoskeletal: negative  Neurologic: negative  Psychiatric: negative  Hematologic/Lymphatic/Immunologic: negative  Endocrine: negative          O:                                   NAD, WDWN, Alert & Oriented, Mood & Affect wnl, Vitals stable                                         Here today alone                                         /74 (BP Location: Left arm, Patient Position: Sitting, Cuff Size: Adult Large)  Pulse 79  Temp 98.7  F (37.1  C) (Tympanic)                                         General appearance normal                                         Vitals stable                                         Alert, oriented and in no acute distress                                             Following lymph nodes palpated: Occipital, Cervical, Supraclavicular , axilla, inguinal, femoral no lad                                         Stuck on papules and brown macules on trunk and ext                                          Pink papules on trunk   Red papules on trunk               The remainder of the full exam was unremarkable; the following areas were examined:  conjunctiva/lids, oral mucosa, neck, peripheral vascular system, abdomen, lymph nodes, digits/nails, eccrine and apocrine glands, scalp/hair, face, neck, chest, abdomen, buttocks, back, RUE, LUE, RLE, LLE                                                          Eyes: Conjunctivae/lids:Normal                                                     ENT: Lips, buccal mucosa, tongue: normal                                                    MSK:Normal                                                    Cardiovascular: peripheral edema none                                                    Pulm: Breathing Normal                                                    Lymph Nodes: No Head and Neck Lymphadenopathy                                                     Neuro/Psych: Orientation:Normal; Mood/Affect:Normal          A/P:  1. Metastatic melanoma, seborrheic keratosis, lentigo, dermal nevi, angioma  MELANOMA DISCUSSED WITH PATIENT:  I discussed the specifics of tumor, prognosis, metachronous melanoma, self exam, and genetics with the patient. I explained the need for monthly skin exams including and taught the patient how to do this. Patient was asked about new or changing moles and a full skin exam was performed.   BENIGN LESIONS DISCUSSED WITH PATIENT:  I discussed the specifics of tumor, prognosis, and genetics of benign lesions.  I explained that treatment of these lesions would be purely cosmetic and not medically neccessary.  I discussed with patient different removal options including excision, cautery and /or laser.        Nature and genetics of benign skin lesions dicussed with patient.  Signs and Symptoms of skin cancer discussed with patient.  ABCDEs of melanoma reviewed with patient.  Patient encouraged to perform monthly skin exams.  UV precautions reviewed with patient.  Skin care regimen reviewed with patient: Eliminate harsh soaps, i.e. Dial, zest, irsih spring; Mild soaps such as Cetaphil or Dove sensitive skin, avoid hot or cold showers, aggressive use of emollients including vanicream, cetaphil or cerave discussed with patient.    Risks of non-melanoma skin cancer discussed with patient   Return to clinic 3  months

## 2018-06-12 NOTE — ADDENDUM NOTE
Encounter addended by: Anna Graham, PT on: 6/12/2018 12:06 PM<BR>     Actions taken: Sign clinical note

## 2018-06-12 NOTE — TELEPHONE ENCOUNTER
Reason for Call:  Other appointment      Detailed comments: Pt has malenoma. She taking steroids but is tapering off and now her neck is swelling. First noticed last week. She called her doctor at [facility] and they told her she should not be swelling this. Today starting down at bottom of neck and swallowing feels funny.    Phone Number Patient can be reached at: Home number on file [phone] (home)    Best Time: any    Can we leave a detailed message on this number?     Call taken on 6/12/2018 at 1:57 PM by Trang Rivera

## 2018-06-12 NOTE — TELEPHONE ENCOUNTER
Huddled with Dr. Rahman about this.  Patient states it feels funny when swallowing.  Thinks she sees a lump, but ongoing for over a week now.  Dr. Rahman feels this is not a acute thing could be something like a fat pad.Tapering down on prednisone.  Patient refusing to go to the ED. Explained that [facility] is limited to what we can do here.  Possibility of calling EMS and transporting her to ED.   No openings with Dr. Naidu, or any medical doctor here tomorrow.  Patient asks to go to the [facility].  Scheduled per patient request. Floridalma PATEL RN

## 2018-06-12 NOTE — ADDENDUM NOTE
Encounter addended by: Anna Graham, PT on: 6/12/2018 12:06 PM<BR>     Actions taken: Episode un-resolved

## 2018-06-12 NOTE — PROGRESS NOTES
Outpatient Physical Therapy Progress Note     Patient: Doretha Fernandez  : 1975    Beginning/End Dates of Reporting Period:  2018 to 2018    Referring Provider: Jessica Castellano PA-C    Therapy Diagnosis: LUE and L-breast lymphedema; deconditioning     Objective Measurements:  Objective Measure: girth (last taken on 18)  Details: L UE +2.3%     Outcome Measures (most recent score):   LLIS = 41 on date of initial evaluation; pt didn't return to clinic for final appt so unable to again complete LLIS    Goals:  Goal Identifier stg   Goal Description pt to increase her 6MWT distance to 1400 feet to allow pt to continue living independently at home without AD   Target Date 18   Date Met      Progress:     Goal Identifier stg   Goal Description pt to increase FACIT score by 3-5 points to assist pt in returning to work and independent level of living   Target Date 18   Date Met      Progress:     Goal Identifier ltg   Goal Description pt to have increased B hip flex and B knee flex/ext to 4+/5 to assist with returning back to work fulltime and exercising    Target Date 18   Date Met      Progress:     Goal Identifier ltg   Goal Description pt to be independent with LUE lymphedema management via HEP, elevation, compression garment wear and self-MLD   Target Date 18   Date Met      Progress:     Goal Identifier ltg   Goal Description pt to have at least 8 point improvement on LLIS due to decreased lymphedema and increased comfort and ROM in LUE   Target Date 18   Date Met      Progress:     Progress Toward Goals:   Progress limited due to: Not assessed this period.  Pt last seen on 18 and since that time has had multiple medical issues most of which she has gone to [facility] for.  Looking ahead patient is now scheduled to return to clinic on 18 which will require a re-evaluation of cares and reestablishment of frequency at that time.      Plan:  Other: re-evaluation  on 6/19/18    Discharge:  No

## 2018-06-12 NOTE — LETTER
2018         RE: Doretha Fernandez  [address]  [place] [address]        Dear Colleague,    Thank you for referring your patient, Doretha Fernandez, to the [facility]. Please see a copy of my visit note below.    Doretha Fernandez is a 43 year old year old female patient here today for f/u h xof melanoma unknown primary.  She went to Dr. Cool for node dissection.1 out of 20 nodes positive.  No adjuvant Radiation given no survvial advantage.   MRI of brain and PET scan both clear.  Was on  Nivolumab got 8 injection but got colitis.  She went to [facility].   She was treated with high dose steroids for colitis and had a dx of bacterial pneumonia.   She was admitted for to hospital for this. She is followed by GI and is on pred taper.  She was started on Entyvio.  The goal would be to restart Nivo once her bowel is better.   She denies any new or changing skin lesions.  Patient reports the following modifying factors none.  Associated symptoms: none.  Patient has no other skin complaints today.  Remainder of the HPI, Meds, PMH, Allergies, FH, and SH was reviewed in chart.    Pertinent Hx:                Melanoma metastatic     Past Medical History            Past Medical History:   Diagnosis Date     Abnormal MRI         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      Anxiety        Depression        Lumbago         left lower back pain     Malignant melanoma (H)        Mild persistent asthma        Prothrombin deficiency (H)         takes 81mg asa daily     Stroke (cerebrum) (H)       During      TIA (transient ischemic attack)                  Past Surgical History              Past Surgical History:   Procedure Laterality Date     COLONOSCOPY N/A 10/18/2017      Procedure: COLONOSCOPY;  Colon;  Surgeon: Debbie Stephens MD;  [facility]     DISSECT LYMPH NODE AXILLA Left 10/23/2017      Procedure: DISSECT LYMPH NODE AXILLA;  Left Axillary Lymph Node Dissection ;   Surgeon: Laurent Cool MD;  [facility] OR     GYN SURGERY                 REPAIR MOHS Left 2017      Procedure: REPAIR MOHS;  Left Upper Lid Moh's Reconstruction;  Surgeon: Kisha Bosch MD;  Location:  OR                Family History                Family History   Problem Relation Age of Onset     CANCER Mother 45         lung     Neurologic Disorder Mother        Lipids Father        GASTROINTESTINAL DISEASE Father        Depression Father        CANCER Maternal Grandmother        Blood Disease Maternal Grandmother        Arthritis Maternal Grandmother        DIABETES Maternal Grandmother        Depression Maternal Grandmother        Macular Degeneration Maternal Grandmother        Glaucoma Maternal Grandmother        DIABETES Maternal Grandfather        CEREBROVASCULAR DISEASE Maternal Grandfather        Blood Disease Maternal Grandfather        HEART DISEASE Maternal Grandfather        Glaucoma Maternal Grandfather        CANCER Paternal Grandmother        Cancer - colorectal Paternal Grandmother        Respiratory Paternal Grandfather        Blood Disease Paternal Grandfather        HEART DISEASE Daughter        Asthma Daughter        Depression Sister                    Social History    Social History                 Social History     Marital status:          Spouse name: N/A     Number of children: N/A     Years of education: N/A             Occupational History     Not on file.                   Social History Main Topics     Smoking status: Passive Smoke Exposure - Never Smoker         Last attempt to quit: 3/20/1998     Smokeless tobacco: Never Used     Alcohol use No            Comment: occ     Drug use: No     Sexual activity: Yes         Partners: Male         Birth control/ protection: Surgical               Other Topics Concern     Parent/Sibling W/ Cabg, Mi Or Angioplasty Before 65f 55m? No             Social History Narrative      20 y.o- patient's mother   " of lung cancer. She had to take care of her younger sister.      7/2012- patient's  had a heart attack with stents placed, followed by cardiac rehabilitation      November 2000 TO December 2012  was in [facility] for [facility]      January 2013 patient's  went through alcohol rehabilitation at [facility]                      They have attended couple counseling a couple of times and patient went to the family program for chemical dependency.      Patient denies alcohol or drug use and herself                      Has 2 children, girls ages 5 and 8      [place] [employer] and also works for the [employer]. 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                                Encounter Medications           Outpatient Encounter Prescriptions as of 1/9/2018   Medication Sig Dispense Refill     venlafaxine (EFFEXOR-ER) 150 MG TB24 24 hr tablet Take 1 tablet (150 mg) by mouth daily (with breakfast) 30 each 0     order for DME Equipment being ordered: 20-30mmHg compression sleeve and 20-30mmHg compression glove\" x2 pair 1 Units 0     rOPINIRole (REQUIP) 0.25 MG tablet Take 1 tablet (0.25 mg) by mouth At Bedtime 30 tablet 11     cephALEXin (KEFLEX) 500 MG capsule Take 2 capsules (1,000 mg) by mouth 2 times daily 24 capsule 0     oxyCODONE IR (ROXICODONE) 5 MG tablet Take 1-3 tablets (5-15 mg) by mouth every 4 hours as needed for pain maximum 12 tablet(s) per day 40 tablet 0     aspirin 81 MG EC tablet Take 81 mg by mouth daily           acetaminophen (TYLENOL) 325 MG tablet Take 2 tablets (650 mg) by mouth every 4 hours as needed for other (mild pain) 100 tablet 0     senna-docusate (SENOKOT-S;PERICOLACE) 8.6-50 MG per tablet Take 1 tablet by mouth 2 times daily as needed for constipation 60 tablet 1     LORazepam (ATIVAN) 1 MG tablet Take 1 tablet (1 mg) by mouth every 8 hours as needed for anxiety 30 tablet 0     Cholecalciferol (VITAMIN D3) 3000 " UNITS TABS Take 3,000 Int'l Units by mouth daily 30 tablet 3     zonisamide (ZONEGRAN) 25 MG capsule Take 1 tablet (25 mg) once daily for 1 week, then 2 tablets once daily for 1 week, then 3 tablets once daily for 1 week, then 4 tablets once daily for 1 week. 90 capsule 1                         Facility-Administered Encounter Medications as of 1/9/2018   Medication Dose Route Frequency Provider Last Rate Last Dose     lidocaine 1 % 9 mL  9 mL Intradermal Once Anna Naidu MD           sodium bicarbonate 8.4 % injection 1 mEq  1 mEq Intradermal Once Anna Naidu MD                                Review Of Systems  Skin: As above  Eyes: negative  Ears/Nose/Throat: negative  Respiratory: No shortness of breath, dyspnea on exertion, cough, or hemoptysis  Cardiovascular: negative  Gastrointestinal: negative  Genitourinary: negative  Musculoskeletal: negative  Neurologic: negative  Psychiatric: negative  Hematologic/Lymphatic/Immunologic: negative  Endocrine: negative          O:                                   NAD, WDWN, Alert & Oriented, Mood & Affect wnl, Vitals stable                                         Here today alone                                         /74 (BP Location: Left arm, Patient Position: Sitting, Cuff Size: Adult Large)  Pulse 79  Temp 98.7  F (37.1  C) (Tympanic)                                         General appearance normal                                         Vitals stable                                         Alert, oriented and in no acute distress                                             Following lymph nodes palpated: Occipital, Cervical, Supraclavicular , axilla, inguinal, femoral no lad                                         Stuck on papules and brown macules on trunk and ext                                          Pink papules on trunk   Red papules on trunk               The remainder of the full exam was unremarkable; the following areas were examined:   conjunctiva/lids, oral mucosa, neck, peripheral vascular system, abdomen, lymph nodes, digits/nails, eccrine and apocrine glands, scalp/hair, face, neck, chest, abdomen, buttocks, back, RUE, LUE, RLE, LLE                                                         Eyes: Conjunctivae/lids:Normal                                                     ENT: Lips, buccal mucosa, tongue: normal                                                    MSK:Normal                                                    Cardiovascular: peripheral edema none                                                    Pulm: Breathing Normal                                                    Lymph Nodes: No Head and Neck Lymphadenopathy                                                     Neuro/Psych: Orientation:Normal; Mood/Affect:Normal          A/P:  1. Metastatic melanoma, seborrheic keratosis, lentigo, dermal nevi, angioma  MELANOMA DISCUSSED WITH PATIENT:  I discussed the specifics of tumor, prognosis, metachronous melanoma, self exam, and genetics with the patient. I explained the need for monthly skin exams including and taught the patient how to do this. Patient was asked about new or changing moles and a full skin exam was performed.   BENIGN LESIONS DISCUSSED WITH PATIENT:  I discussed the specifics of tumor, prognosis, and genetics of benign lesions.  I explained that treatment of these lesions would be purely cosmetic and not medically neccessary.  I discussed with patient different removal options including excision, cautery and /or laser.        Nature and genetics of benign skin lesions dicussed with patient.  Signs and Symptoms of skin cancer discussed with patient.  ABCDEs of melanoma reviewed with patient.  Patient encouraged to perform monthly skin exams.  UV precautions reviewed with patient.  Skin care regimen reviewed with patient: Eliminate harsh soaps, i.e. Dial, zest, irsih spring; Mild soaps such as Cetaphil or Dove sensitive  skin, avoid hot or cold showers, aggressive use of emollients including vanicream, cetaphil or cerave discussed with patient.    Risks of non-melanoma skin cancer discussed with patient   Return to clinic 3 months      Again, thank you for allowing me to participate in the care of your patient.        Sincerely,        Lowell Bullock MD

## 2018-06-12 NOTE — NURSING NOTE
Chief Complaint   Patient presents with     Skin Check       Vitals:    06/12/18 1135   BP: 113/72   Pulse: 84   SpO2: 96%     Wt Readings from Last 1 Encounters:   05/09/18 93.4 kg (206 lb)     Tammie Gil LPN.................6/12/2018

## 2018-06-13 ENCOUNTER — HOSPITAL ENCOUNTER (OUTPATIENT)
Dept: CT IMAGING | Facility: CLINIC | Age: 43
Discharge: HOME | End: 2018-06-13
Attending: NURSE PRACTITIONER | Admitting: NURSE PRACTITIONER
Payer: COMMERCIAL

## 2018-06-13 ENCOUNTER — OFFICE VISIT (OUTPATIENT)
Dept: FAMILY MEDICINE | Facility: CLINIC | Age: 43
End: 2018-06-13
Payer: COMMERCIAL

## 2018-06-13 VITALS
HEART RATE: 84 BPM | BODY MASS INDEX: 36.85 KG/M2 | DIASTOLIC BLOOD PRESSURE: 79 MMHG | SYSTOLIC BLOOD PRESSURE: 118 MMHG | WEIGHT: 208 LBS | TEMPERATURE: 97.6 F | OXYGEN SATURATION: 97 %

## 2018-06-13 DIAGNOSIS — C43.9 MALIGNANT MELANOMA, UNSPECIFIED SITE (H): ICD-10-CM

## 2018-06-13 DIAGNOSIS — R22.1 NECK SWELLING: Primary | ICD-10-CM

## 2018-06-13 DIAGNOSIS — Z79.52 CURRENT CHRONIC USE OF SYSTEMIC STEROIDS: ICD-10-CM

## 2018-06-13 DIAGNOSIS — K51.919 ULCERATIVE COLITIS WITH COMPLICATION, UNSPECIFIED LOCATION (H): ICD-10-CM

## 2018-06-13 DIAGNOSIS — R22.1 NECK SWELLING: ICD-10-CM

## 2018-06-13 PROCEDURE — 99215 OFFICE O/P EST HI 40 MIN: CPT | Performed by: NURSE PRACTITIONER

## 2018-06-13 PROCEDURE — 25000128 H RX IP 250 OP 636: Performed by: NURSE PRACTITIONER

## 2018-06-13 PROCEDURE — 70491 CT SOFT TISSUE NECK W/DYE: CPT

## 2018-06-13 PROCEDURE — 25000125 ZZHC RX 250: Performed by: NURSE PRACTITIONER

## 2018-06-13 RX ORDER — ONDANSETRON 8 MG/1
8 TABLET, FILM COATED ORAL EVERY 8 HOURS PRN
COMMUNITY
End: 2021-06-03

## 2018-06-13 RX ORDER — POLYETHYLENE GLYCOL 3350 17 G/17G
1 POWDER, FOR SOLUTION ORAL DAILY
COMMUNITY
End: 2018-11-14

## 2018-06-13 RX ORDER — IOPAMIDOL 755 MG/ML
80 INJECTION, SOLUTION INTRAVASCULAR ONCE
Status: COMPLETED | OUTPATIENT
Start: 2018-06-13 | End: 2018-06-13

## 2018-06-13 RX ORDER — LOPERAMIDE HCL 2 MG
2 CAPSULE ORAL 4 TIMES DAILY PRN
COMMUNITY
End: 2018-11-14

## 2018-06-13 RX ORDER — CHOLESTYRAMINE 4 G/9G
1 POWDER, FOR SUSPENSION ORAL
COMMUNITY
End: 2018-11-13

## 2018-06-13 RX ORDER — BUDESONIDE 3 MG/1
12 CAPSULE, COATED PELLETS ORAL
COMMUNITY
Start: 2018-05-20 | End: 2018-11-13

## 2018-06-13 RX ADMIN — SODIUM CHLORIDE 70 ML: 9 INJECTION, SOLUTION INTRAVENOUS at 10:33

## 2018-06-13 RX ADMIN — IOPAMIDOL 80 ML: 755 INJECTION, SOLUTION INTRAVENOUS at 10:33

## 2018-06-13 NOTE — MR AVS SNAPSHOT
After Visit Summary   6/13/2018    Doretha Fernandez    MRN: [id number]           Patient Information     Date Of Birth          1975        Visit Information        Provider Department      6/13/2018 8:00 AM Chantelle Martell APRN CNP [facility]        Today's Diagnoses     Neck swelling    -  1    Malignant melanoma, unspecified site (H)        Current chronic use of systemic steroids        Ulcerative colitis with complication, unspecified location (H)          Care Instructions    1. CT scan today and then follow up with [place]          Thank you for choosing [facility].  You may be receiving a survey in the mail from [facility] regarding your visit today.  Please take a few minutes to complete and return the survey to let us know how we are doing.      If you have questions or concerns, please contact us via [employer] or you can contact your care team at [phone].    Our Clinic hours are:  Monday 6:40 am  to 7:00 pm  Tuesday -Friday 6:40 am to 5:00 pm    The [place] outpatient lab hours are:  Monday - Friday 6:10 am to 4:45 pm  Saturdays 7:00 am to 11:00 am  Appointments are required, call [phone]    If you have clinical questions after hours or would like to schedule an appointment,  call the clinic at [phone].          Follow-ups after your visit        Your next 10 appointments already scheduled     Jun 19, 2018  9:15 AM CDT   Lymphedema Evaluation with Anna Graham PT   [facility] ([facility])    [address] [address]   [phone]            Sep 11, 2018 10:00 AM CDT   Return Visit with Lowell Bullock MD   [facility]    [address] [address]   [phone]              Future tests that were ordered for you today     Open Future Orders        Priority Expected Expires Ordered    CT Soft Tissue Neck w Contrast STAT  6/13/2019  6/13/2018            Who to contact     If you have questions or need follow up information about today's clinic visit or your schedule please contact [facility] directly at [phone].  Normal or non-critical lab and imaging results will be communicated to you by MyChart, letter or phone within 4 business days after the clinic has received the results. If you do not hear from us within 7 days, please contact the clinic through MyChart or phone. If you have a critical or abnormal lab result, we will notify you by phone as soon as possible.  Submit refill requests through [employer] or call your pharmacy and they will forward the refill request to us. Please allow 3 business days for your refill to be completed.          Additional Information About Your Visit        [employer] and [employer] Information     [employer] gives you secure access to your electronic health record. If you see a primary care provider, you can also send messages to your care team and make appointments. If you have questions, please call your primary care clinic.  If you do not have a primary care provider, please call [phone] and they will assist you.        Care EveryWhere ID     This is your Care EveryWhere ID. This could be used by other organizations to access your [place] medical records  [id number]        Your Vitals Were     Pulse Temperature Pulse Oximetry BMI (Body Mass Index)          84 97.6  F (36.4  C) (Tympanic) 97% 36.85 kg/m2         Blood Pressure from Last 3 Encounters:   06/13/18 118/79   06/12/18 113/72   05/09/18 112/79    Weight from Last 3 Encounters:   06/13/18 208 lb (94.3 kg)   05/09/18 206 lb (93.4 kg)   05/09/18 213 lb 10 oz (96.9 kg)                 Today's Medication Changes          These changes are accurate as of 6/13/18  9:06 AM.  If you have any questions, ask your nurse or doctor.               These medicines have changed or have updated prescriptions.        Dose/Directions    predniSONE 10 MG tablet    Commonly known as:  DELTASONE   This may have changed:  when to take this   Used for:  Malignant melanoma of left upper extremity including shoulder (H), Colitis        Dose:  20 mg   Take 2 tablets (20 mg) by mouth daily   Quantity:  100 tablet   Refills:  1                Primary Care Provider Office Phone # Fax #    Anna Naidu [phone] [address] [facility] [address]        Equal Access to Services     BRIGETTE QUARLES : Hadii aad ku hadasho Soomaali, waaxda luqadaha, qaybta kaalmada adeegyada, waxay anyain hayaan adechasity schulzjennyfernando lakay . So [facility] [phone].    ATENCIÓN: Si kyara gordon, tiene a mcdonnell disposición servicios gratuitos de asistencia lingüística. Diana al [phone].    We comply with applicable federal civil rights laws and [place] laws. We do not discriminate on the basis of race, color, national origin, age, disability, sex, sexual orientation, or gender identity.            Thank you!     Thank you for choosing [facility]  for your care. Our goal is always to provide you with excellent care. Hearing back from our patients is one way we can continue to improve our services. Please take a few minutes to complete the written survey that you may receive in the mail after your visit with us. Thank you!             Your Updated Medication List - Protect others around you: Learn how to safely use, store and throw away your medicines at www.disposemymeds.org.          This list is accurate as of 6/13/18  9:06 AM.  Always use your most recent med list.                   Brand Name Dispense Instructions for use Diagnosis    budesonide 3 MG EC capsule    ENTOCORT EC     Take 12 mg by mouth        cholestyramine 4 g Packet    QUESTRAN     Take 1 packet by mouth 3 times daily (with meals)        HYDROmorphone 4 MG tablet    DILAUDID    30 tablet    Take 1-1.5 tablets (4-6 mg) by mouth every 3 hours as needed for moderate to severe pain    Proctocolitis     "   loperamide 2 MG capsule    IMODIUM     Take 2 mg by mouth 4 times daily as needed for diarrhea        LORazepam 0.5 MG tablet    ATIVAN    40 tablet    Take 1-2 tablets (0.5-1 mg) by mouth every 6 hours as needed for anxiety or other (nausea)    Proctocolitis, Anxiety state, Metastatic malignant melanoma (H)       * order for DME     1 Units    Equipment being ordered: 20-30mmHg compression sleeve and 20-30mmHg compression glove\" x2 pair    Lymphedema of left arm       * order for DME     1 each    Equipment being ordered: x2 Weare[employer] compression shirt    Secondary lymphedema       polyethylene glycol Packet    MIRALAX/GLYCOLAX     Take 1 packet by mouth daily        predniSONE 10 MG tablet    DELTASONE    100 tablet    Take 2 tablets (20 mg) by mouth daily    Malignant melanoma of left upper extremity including shoulder (H), Colitis       promethazine 25 MG tablet    PHENERGAN    56 tablet    Take 1 tablet (25 mg) by mouth every 4 hours as needed for nausea or vomiting    Proctocolitis       rOPINIRole 0.25 MG tablet    REQUIP    30 tablet    Take 1 tablet (0.25 mg) by mouth At Bedtime    Restless leg syndrome       sulfamethoxazole-trimethoprim 400-80 MG per tablet    BACTRIM/SEPTRA     Take 1 tablet by mouth daily While on high dose steroids (prednisone 20mg or greater)        TYLENOL PO      Take 1,000 mg by mouth every 8 hours as needed for mild pain or fever        venlafaxine 150 MG Tb24 24 hr tablet    EFFEXOR-ER    90 each    Take 1 tablet (150 mg) by mouth daily (with breakfast)    Mild major depression (H)       Vitamin D3 3000 units Tabs     30 tablet    Take 3,000 Int'l Units by mouth daily    Vitamin D deficiency       ZOFRAN 8 MG tablet   Generic drug:  ondansetron      Take by mouth every 8 hours as needed for nausea        * Notice:  This list has 2 medication(s) that are the same as other medications prescribed for you. Read the directions carefully, and ask your doctor or other care provider to " review them with you.

## 2018-06-13 NOTE — PATIENT INSTRUCTIONS
1. CT scan today and then follow up with [place]          Thank you for choosing [facility] ([facility]).  You may be receiving a survey in the mail from [employer] regarding your visit today.  Please take a few minutes to complete and return the survey to let us know how we are doing.      If you have questions or concerns, please contact us via [employer] or you can contact your care team at [phone].    Our Clinic hours are:  Monday 6:40 am  to 7:00 pm  Tuesday -Friday 6:40 am to 5:00 pm    The [place] outpatient lab hours are:  Monday - Friday 6:10 am to 4:45 pm  Saturdays 7:00 am to 11:00 am  Appointments are required, call [phone]    If you have clinical questions after hours or would like to schedule an appointment,  call the clinic at [phone].

## 2018-06-13 NOTE — PROGRESS NOTES
"  SUBJECTIVE:   Doretha Fernandez is a 43 year old female who presents to clinic today for the following health issues:    Patient with history of melanoma, UC- followed by [place]- on chronic steroids for past 5 months.       C/O facial and neck swelling for 1.5 weeks. Having a feeling of \"lump\" in throat. Has been on Prednisone since 2018. Treatment on hold for Melanoma.   Reports Reflux feeling in throat. On Omeprazole.   Feels slightly more shortness of breath due to neck swelling. Concerned that she had a lump on anterior neck.     -------------------------------------    Problem list and histories reviewed & adjusted, as indicated.  Additional history: as documented    Patient Active Problem List   Diagnosis     CARDIOVASCULAR SCREENING; LDL GOAL LESS THAN 160     DUB (dysfunctional uterine bleeding)     Anxiety state     Esophageal reflux     Mild major depression (H)     Mild intermittent asthma without complication     Vision changes     Prothrombin mutation (H)     Metastatic malignant melanoma (H)     Malignant melanoma of left upper extremity including shoulder (H)     Melanoma (H)     Functional diarrhea     Colitis     Rectal bleeding     Diarrhea     Intestinal giardiasis     Bilateral leg cramps     Rash and nonspecific skin eruption     Influenza-like illness     Other chronic pain     Past Surgical History:   Procedure Laterality Date     COLONOSCOPY N/A 10/18/2017    Procedure: COLONOSCOPY;  Colon;  Surgeon: Debbie Stephens MD;  [facility]     COLONOSCOPY N/A 3/9/2018    Procedure: COMBINED COLONOSCOPY, SINGLE OR MULTIPLE BIOPSY/POLYPECTOMY BY BIOPSY;  colon  ;  Surgeon: Benita Schumacher MD;  Location:  GI     DISSECT LYMPH NODE AXILLA Left 10/23/2017    Procedure: DISSECT LYMPH NODE AXILLA;  Left Axillary Lymph Node Dissection ;  Surgeon: Laurent Cool MD;  Location: [facility] OR     GYN SURGERY           REPAIR MOHS Left 2017    Procedure: REPAIR MOHS;  Left Upper Lid Moh's " Reconstruction;  Surgeon: Kisha Bosch MD;  Location:  OR       Social History   Substance Use Topics     Smoking status: Passive Smoke Exposure - Never Smoker     Last attempt to quit: 3/20/1998     Smokeless tobacco: Never Used     Alcohol use No      Comment: occ     Family History   Problem Relation Age of Onset     CANCER Mother 45     lung     Neurologic Disorder Mother      Lipids Father      GASTROINTESTINAL DISEASE Father      Depression Father      CANCER Maternal Grandmother      Blood Disease Maternal Grandmother      Arthritis Maternal Grandmother      DIABETES Maternal Grandmother      Depression Maternal Grandmother      Macular Degeneration Maternal Grandmother      Glaucoma Maternal Grandmother      DIABETES Maternal Grandfather      CEREBROVASCULAR DISEASE Maternal Grandfather      Blood Disease Maternal Grandfather      HEART DISEASE Maternal Grandfather      Glaucoma Maternal Grandfather      CANCER Paternal Grandmother      Cancer - colorectal Paternal Grandmother      Respiratory Paternal Grandfather      Blood Disease Paternal Grandfather      HEART DISEASE Daughter      Asthma Daughter      Depression Sister            Reviewed and updated as needed this visit by clinical staff       Reviewed and updated as needed this visit by Provider         ROS:  Constitutional, HEENT, cardiovascular, pulmonary, gi and gu systems are negative, except as otherwise noted.    OBJECTIVE:     /79 (BP Location: Left arm, Patient Position: Chair, Cuff Size: Adult Large)  Pulse 84  Temp 97.6  F (36.4  C) (Tympanic)  Wt 208 lb (94.3 kg)  SpO2 97%  BMI 36.85 kg/m2  Body mass index is 36.85 kg/(m^2).  GENERAL: alert, no distress and cushing face appearance   NECK: fullness/supple- no asymmetry, masses, or scars, lump noted on anterior neck   RESP: lungs clear to auscultation - no rales, rhonchi or wheezes  CV: regular rate and rhythm, normal S1 S2, no S3 or S4, no murmur, click or rub,  MS: no  gross musculoskeletal defects noted, no edema    Diagnostic Test Results:  none     ASSESSMENT/PLAN:         1. Neck swelling  ? Vascular vs side effect from chronic steroid use vs thyroid vs lymphadenopathy   - CT Soft Tissue Neck w Contrast; Future    2. Malignant melanoma, unspecified site (H)  - patient followed closely by oncology at [place]   - CT Soft Tissue Neck w Contrast; Future    3. Ulcerative colitis with complication, unspecified location (H)  Patient followed by [place] by GI  - on chronic steroid use - receiving Entyvio infusions   - CT Soft Tissue Neck w Contrast; Future    4. Current chronic use of systemic steroids  - recommend to follow up with [place] regarding prednisone dose  - CT Soft Tissue Neck w Contrast; Future    > 45  min spent in direct face to face time with this patient, greater than 50% in counseling and coordination of care in chart review discussing melanoma, UC treatment and symptoms.  Consult with Dr. Cameron from [facility]       AMADA Sahu [facility]

## 2018-06-13 NOTE — NURSING NOTE
"Initial /79 (BP Location: Left arm, Patient Position: Chair, Cuff Size: Adult Large)  Pulse 84  Temp 97.6  F (36.4  C) (Tympanic)  Wt 208 lb (94.3 kg)  SpO2 97%  BMI 36.85 kg/m2 Estimated body mass index is 36.85 kg/(m^2) as calculated from the following:    Height as of 5/9/18: 5' 3\" (1.6 m).    Weight as of this encounter: 208 lb (94.3 kg). .    Gi Toscano    "

## 2018-06-13 NOTE — NURSING NOTE
IV #22  in the right wrist is removed without difficulty.  Patient tolerated procedure well. Floridalma PATEL RN

## 2018-06-19 ENCOUNTER — HOSPITAL ENCOUNTER (OUTPATIENT)
Dept: PHYSICAL THERAPY | Facility: CLINIC | Age: 43
Setting detail: THERAPIES SERIES
End: 2018-06-19
Attending: ORTHOPAEDIC SURGERY
Payer: COMMERCIAL

## 2018-06-19 PROCEDURE — 40000099 ZZH STATISTIC LYMPHEDEMA VISIT: Performed by: PHYSICAL THERAPIST

## 2018-06-19 PROCEDURE — 97140 MANUAL THERAPY 1/> REGIONS: CPT | Mod: GP | Performed by: PHYSICAL THERAPIST

## 2018-06-19 PROCEDURE — 97110 THERAPEUTIC EXERCISES: CPT | Mod: GP | Performed by: PHYSICAL THERAPIST

## 2018-06-19 PROCEDURE — 97164 PT RE-EVAL EST PLAN CARE: CPT | Mod: GP,59 | Performed by: PHYSICAL THERAPIST

## 2018-06-21 ENCOUNTER — HOSPITAL ENCOUNTER (OUTPATIENT)
Dept: PHYSICAL THERAPY | Facility: CLINIC | Age: 43
Setting detail: THERAPIES SERIES
End: 2018-06-21
Attending: ORTHOPAEDIC SURGERY
Payer: COMMERCIAL

## 2018-06-21 PROCEDURE — 97110 THERAPEUTIC EXERCISES: CPT | Mod: GP | Performed by: REHABILITATION PRACTITIONER

## 2018-06-21 PROCEDURE — 40000099 ZZH STATISTIC LYMPHEDEMA VISIT: Performed by: REHABILITATION PRACTITIONER

## 2018-06-25 ENCOUNTER — HOSPITAL ENCOUNTER (OUTPATIENT)
Dept: PHYSICAL THERAPY | Facility: CLINIC | Age: 43
Setting detail: THERAPIES SERIES
End: 2018-06-25
Attending: ORTHOPAEDIC SURGERY
Payer: COMMERCIAL

## 2018-06-25 PROCEDURE — 40000099 ZZH STATISTIC LYMPHEDEMA VISIT: Performed by: REHABILITATION PRACTITIONER

## 2018-06-25 PROCEDURE — 97110 THERAPEUTIC EXERCISES: CPT | Mod: GP | Performed by: REHABILITATION PRACTITIONER

## 2018-07-11 DIAGNOSIS — C43.9 METASTATIC MALIGNANT MELANOMA (H): ICD-10-CM

## 2018-07-11 DIAGNOSIS — K52.9 PROCTOCOLITIS: ICD-10-CM

## 2018-07-11 DIAGNOSIS — F41.1 ANXIETY STATE: ICD-10-CM

## 2018-07-12 RX ORDER — LORAZEPAM 0.5 MG/1
.5-1 TABLET ORAL EVERY 6 HOURS PRN
Qty: 40 TABLET | Refills: 0 | Status: SHIPPED | OUTPATIENT
Start: 2018-07-12 | End: 2018-08-20

## 2018-07-12 NOTE — TELEPHONE ENCOUNTER
LORazepam (ATIVAN) 0.5 MG tablet  Last Written Prescription Date:  4/19/2018  Last Fill Quantity: 40,   # refills: 0  Last Office Visit: 6/13/2018  Future Office visit:    Next 5 appointments (look out 90 days)     Sep 11, 2018 10:00 AM CDT   [facility] Lowell Bullock [facility]   [facility]    [address] [address]   [phone]                   Routing refill request to provider for review/approval because:  Drug not on the [facility], [facility] or [facility] refill protocol or controlled substance

## 2018-07-18 ENCOUNTER — TRANSFERRED RECORDS (OUTPATIENT)
Dept: HEALTH INFORMATION MANAGEMENT | Facility: CLINIC | Age: 43
End: 2018-07-18

## 2018-07-21 ENCOUNTER — TRANSFERRED RECORDS (OUTPATIENT)
Dept: HEALTH INFORMATION MANAGEMENT | Facility: CLINIC | Age: 43
End: 2018-07-21

## 2018-07-22 LAB — HEP C HIM: NORMAL

## 2018-07-24 LAB — CHLAMYDIA - HIM PATIENT REPORTED: NEGATIVE

## 2018-07-31 ENCOUNTER — TRANSFERRED RECORDS (OUTPATIENT)
Dept: HEALTH INFORMATION MANAGEMENT | Facility: CLINIC | Age: 43
End: 2018-07-31

## 2018-07-31 NOTE — ADDENDUM NOTE
Encounter addended by: Anna Graham, PT on: 7/31/2018 11:06 AM<BR>     Actions taken: Flowsheet accepted, Sign clinical note

## 2018-07-31 NOTE — PROGRESS NOTES
Outpatient Physical Therapy Progress Note     Patient: Doretha Fernandez  : 1975    Beginning/End Dates of Reporting Period:  2018 to 2018    Referring Provider: Jessica Castellano PA-C    Therapy Diagnosis: L UE andL  breast lymphedema and caner rehab     Client Self Report: had to take a nap after least tx, has not made appt for pump rep will call today.    Objective Measurements:  Objective Measure: girth  Details: as last measured on 18 LUE - 9.6%     Outcome Measures (most recent score):   LLIS = 41 on date of initial evaluation; pt will again complete LLIS at time of discharge    Goals:  Goal Identifier stg   Goal Description pt to increase her 6MWT distance to 1400 feet to allow pt to continue living independently at home without AD   Target Date 18   Date Met      Progress:     Goal Identifier stg   Goal Description pt to increase FACIT score by 3-5 points to assist pt in returning to work and independent level of living   Target Date 18   Date Met      Progress:     Goal Identifier ltg   Goal Description pt to have increased B hip flex to 4+/5 to assist with returning back to work fulltime and exercising    Target Date 18   Date Met      Progress:     Goal Identifier ltg   Goal Description pt to be independent with LUE lymphedema management via HEP, elevation, compression garment wear and self-MLD   Target Date 18   Date Met      Progress:     Goal Identifier ltg   Goal Description pt to have at least 8 point improvement on LLIS due to decreased lymphedema and increased comfort and ROM in LUE   Target Date 18   Date Met      Progress:       Progress Toward Goals:   Progress limited due to patient only see 3x since initial evaluation. Pt limited due to medical complications with recent hospitalization at [place] from  - . Will keep chart open one additional month to see if patient will return to clinic if medically stable/able as patient still would greatly  benefit from cancer rehab to assist pt in returning to PLOF.       Plan:  Continue therapy per current plan of care.    Discharge:  No

## 2018-08-15 NOTE — ADDENDUM NOTE
Encounter addended by: Anna Graham, PT on: 8/15/2018  2:05 PM<BR>     Actions taken: Sign clinical note, Episode resolved

## 2018-08-15 NOTE — PROGRESS NOTES
Outpatient Physical Therapy Discharge Note     Patient: Doretha Fernandez  : 1975    Beginning/End Dates of Reporting Period:  2018 to 8/15/2018    Referring Provider: Jessica Castellano PA-C    Therapy Diagnosis: L-breast lymphedema and generalized deconditioning/weakness      Client Self Report: had to take a nap after least tx, has not made appt for pump rep will call today.    Objective Measurements:  Objective Measure: girth  Details: as last measured on 18 LUE - 9.6%       Outcome Measures (most recent score):   LLIS = 41 on date of initial evaluation; pt didn't return to clinic after 18 so unable to again complete LLIS for discharge    Goals:  Goal Identifier stg   Goal Description pt to increase her 6MWT distance to 1400 feet to allow pt to continue living independently at home without AD   Target Date 18   Date Met      Progress:     Goal Identifier stg   Goal Description pt to increase FACIT score by 3-5 points to assist pt in returning to work and independent level of living   Target Date 18   Date Met      Progress:     Goal Identifier ltg   Goal Description pt to have increased B hip flex to 4+/5 to assist with returning back to work fulltime and exercising    Target Date 18   Date Met      Progress:     Goal Identifier ltg   Goal Description pt to be independent with LUE lymphedema management via HEP, elevation, compression garment wear and self-MLD   Target Date 18   Date Met      Progress:     Goal Identifier ltg   Goal Description pt to have at least 8 point improvement on LLIS due to decreased lymphedema and increased comfort and ROM in LUE   Target Date 18   Date Met      Progress:       Progress Toward Goals:   Progress limited due to last seen in clinic on 18 and then hospitalized from 18 - 18 for polyarthritis. Pt very infrequently able to make clinic appts and hasn't been seen since 18 and doesn't have any future appts  scheduled. Will be discharged at this time.       Plan:  Discharge from therapy.    Discharge:    Reason for Discharge: see above    Equipment Issued: none    Discharge Plan: PCP as needed

## 2018-08-19 NOTE — PROGRESS NOTES
"Lympedema Therapy and Physical Therapy (Cancer Rehab) Re-evaluation:     06/19/18 0900   Rehab Discipline   Discipline PT   Type of Visit   Type of visit Edema Re-evaluation       present No   General Information   Start of care 06/19/18   Referring physician Jessica Castellano PA-C  (for lymphedema and Dr. Richards for cancer rehab)   Order date 11/13/17  (original order lymph; 4/5/18 for cancern rehab)   Medical diagnosis lymphedema of: LUE L-breast/chest, under L-axilla and mid-to-upper L-back; significant deconditioning with chronic cancer treatments and numerous hospitalizations   Affected body parts LUE  (dsital to L axilla and back )   Edema etiology comments see eval flowsheet from 4/5/18   Pertinent history of current problem (PT: include personal factors and/or comorbidities that impact the POC; OT: include additional occupational profile info) update since last seen in April 2018:  no longer seeing any [facility] MDs except Dr. Bullock (dermatologist); all doctors/oncologists are at [facility] and primary oncologist is Dr. Maldonado; beginning of May was admitted at [facility] for bacterial pneumonia and severe colitis \"for a few weeks\"; on current Prednisone taper (currently onl 15mg/day; on since February 2018) sees Dr. Bullock every 3 months for regular skin checks; goes to [facility] for periodic infusions the help \"repair my colon\" and then planning to get back to bi-wekly infusions ; also did receive Flexitouch compression pump just needs to call company to set-up training at home   Surgical / medical history reviewed Yes   Prior level of functional mobility independent with daily mobility but reporst 2.5 weeks ago couldn't walk down her stairs due to weakness and very SOB;  now noticing every day getting slightly better and able to walk into clinic today feeling only \"a little SOB\"; onyl able to do daily acitvities and walk to mailbox but no additional exercises/activity above and beyond that   Prior " treatment Complete decongestive therapy;Compression garments;Exercise;Compression pump;MLD;Gradient compression bandaging;Elevation   Patient role / employment history (currently not working due to medical status)   [facility] [place] [facility]   Living environment comments two kids   Assistive device comments no   Fall Risk Screen   Fall screen completed by PT   Have you fallen 2 or more times in the past year? No   Have you fallen and had an injury in the past year? No   Is patient a fall risk? No   System Outcome Measures   Outcome Measures Lymphedema  (as completed on 4/5/18)   FACIT Fatigue Subscale (score out of 52). The higher the score, the better the QOL. 8   Lymphedema Life Impact Scale (score range 0-72). A higher score indicates greater impairment. 41   Subjective Report   Patient report of symptoms LUE, L-distal axillar and L-mid back all feel very full and tender   Patient / Family Goals   Patient / family goals statement I need to start feeling better and getting stronger and be able to work 4 hours /day starting July 1 and also address the swelling and fullness in my arm, chest and upper back   Pain   Patient currently in pain No   Vitals Signs   Heart Rate 84   SpO2 99   Cognitive Status   Orientation Orientation to person, place and time   Level of consciousness Alert   Follows commands and answers questions 100% of the time   Personal safety and judgement Intact   Memory Intact   Edema Exam / Assessment   Skin condition Non-pitting;Intact   Scar Yes   Location just distal to L-axilla   Mobility good   Capillary refill Symmetrical   Dorsal pedal pulse Symmetrical   Stemmer sign Negative   Ulceration No   Girth Measurements   Girth Measurements Refer to separate girth measurement flowsheet   Volume UE   Left UE (mL) 2239.91   % difference -9.6% since last measured in April 2018   Range of Motion   ROM comments LUE functional GH flexion, abduction and IR/ER   Strength   Strength comments B hip  "flex 4-/5, B knee flex/ext 4+/5 and DF/PF 4+/5   Posture   Posture Forward head position   Palpation   Palpation denies any sensitivities   Activities of Daily Living   Activities of Daily Living poor to fair activity tolerance; doesn't need an AD but only able to andreas on feet for a few minutes at a time before needing to sit and rest   Sensory   Sensory perception Light touch   Light touch Impaired   Sensory perception comments decreased sensation at L-breast and Larm but pt reports \"the feeling is slowly coming back\"   Vascular Assessment   Vascular Assessment Comments no known concerns   Coordination   Coordination Gross motor coordination appropriate   Muscle Tone   Muscle tone No deficits were identified   Planned Edema Interventions   Planned edema interventions Manual lymph drainage;Gradient compression bandaging;Fit for compression garment;Exercises;Precautions to prevent infection / exacerbation;Education;Manual therapy;Skin care / precautions;Home management program development   Planned edema intervention comments cancer rehab: aerobic conditioning and strength training for generalized deconditioning   Clinical Impression   Criteria for skilled therapeutic intervention met Yes   Therapy diagnosis LUE, chest/breast and back lymphedema with generalized deconditioning s/p cancer treatments and prolonged illness   Influenced by the following impairments / conditions Stage 2   Functional limitations due to impairments / conditions prolonged activity, prolonged ambulation, stairs, unable to work at this time   Clinical Presentation Evolving/Changing   Clinical Presentation Rationale clinical judgement; LLIS; FACIT   Clinical Decision Making (Complexity) Moderate complexity   Treatment frequency 2 times / week   Treatment duration 12 weeks   Patient / family and/or staff in agreement with plan of care Yes   Risks and benefits of therapy have been explained Yes   Goals   Edema Eval Goals 1;2;3;4;5   Goal 1   Goal " identifier stg   Goal description pt to increase her 6MWT distance to 1400 feet to allow pt to continue living independently at home without AD   Target date 07/03/18   Goal 2   Goal identifier stg   Goal description pt to increase FACIT score by 3-5 points to assist pt in returning to work and independent level of living   Target date 07/03/18   Goal 3   Goal identifier ltg   Goal description pt to have increased B hip flex to 4+/5 to assist with returning back to work fulltime and exercising    Target date 09/17/18   Goal 4   Goal identifier ltg   Goal description pt to be independent with LUE lymphedema management via HEP, elevation, compression garment wear and self-MLD   Target date 09/17/18   Goal 5   Goal identifier ltg   Goal description pt to have at least 8 point improvement on LLIS due to decreased lymphedema and increased comfort and ROM in LUE   Target date 09/17/18   Total Evaluation Time   Total evaluation time 15      - per patient last A1C in April/May was 11, reported in allscripts as <14  - A1c 13.1  - Continue lantus to 40U given elevated FS and increase humalog to 19U TID  - encourage strict outpt follow up as patient has uncontrolled diabetes with multiple complications

## 2018-08-20 ENCOUNTER — TELEPHONE (OUTPATIENT)
Dept: FAMILY MEDICINE | Facility: CLINIC | Age: 43
End: 2018-08-20

## 2018-08-20 DIAGNOSIS — F41.1 ANXIETY STATE: ICD-10-CM

## 2018-08-20 DIAGNOSIS — C43.9 METASTATIC MALIGNANT MELANOMA (H): ICD-10-CM

## 2018-08-20 DIAGNOSIS — K52.9 PROCTOCOLITIS: ICD-10-CM

## 2018-08-20 NOTE — TELEPHONE ENCOUNTER
LORazepam (ATIVAN) 0.5 MG tablet   Last Written Prescription Date:  7/12/2018  Last Fill Quantity: 40,   # refills: 0  Last Office Visit: 6/13/2018 with Jayshree  Future Office visit:    Next 5 appointments (look out 90 days)     Sep 11, 2018 10:00 AM CDT   [facility] Lowell [facility]ock [facility]   [facility]    [address] [address]   [phone]                   Routing refill request to provider for review/approval because:  Drug not on the [facility], [facility] or [facility] refill protocol or controlled substance

## 2018-08-21 NOTE — TELEPHONE ENCOUNTER
Routing refill request to provider for review/approval because:  Drug not on the [facility] refill protocol   Sarah FAM RN

## 2018-08-22 RX ORDER — LORAZEPAM 0.5 MG/1
.5-1 TABLET ORAL EVERY 6 HOURS PRN
Qty: 10 TABLET | Refills: 0 | Status: SHIPPED | OUTPATIENT
Start: 2018-08-22 | End: 2019-01-28

## 2018-08-22 NOTE — TELEPHONE ENCOUNTER
Refilled #10.  I haven't seen patient in a long time, this is a controlled substance and further refills will require a visit. Please notify patient.    Anna Naidu M.D.

## 2018-08-22 NOTE — TELEPHONE ENCOUNTER
Rx signed and faxed to our pharmacy here at [facility]. Patient notified.  Elisa Zapata  [facility] [place] [facility]

## 2018-09-07 DIAGNOSIS — E55.9 VITAMIN D DEFICIENCY: ICD-10-CM

## 2018-09-07 RX ORDER — GLUCOSAMINE HCL 500 MG
3000 TABLET ORAL DAILY
Qty: 90 TABLET | Refills: 2 | Status: SHIPPED | OUTPATIENT
Start: 2018-09-07 | End: 2019-09-02

## 2018-09-07 NOTE — TELEPHONE ENCOUNTER
"Requested Prescriptions   Pending Prescriptions Disp Refills     Cholecalciferol (VITAMIN D3) 3000 units TABS 30 tablet 3     Sig: Take 3,000 Int'l Units by mouth daily    Vitamin Supplements (Adult) Protocol Passed    9/7/2018 10:44 AM       Passed - High dose Vitamin D not ordered       Passed - Recent (12 mo) or future (30 days) visit within the authorizing provider's specialty    Patient had office visit in the last 12 months or has a visit in the next 30 days with authorizing provider or within the authorizing provider's specialty.  See \"Patient Info\" tab in inbasket, or \"Choose Columns\" in Meds & Orders section of the refill encounter.            Prescription approved per [facility] Refill Protocol. Renee SIMPSON RN    "

## 2018-09-12 ENCOUNTER — TRANSFERRED RECORDS (OUTPATIENT)
Dept: HEALTH INFORMATION MANAGEMENT | Facility: CLINIC | Age: 43
End: 2018-09-12

## 2018-09-26 ENCOUNTER — OFFICE VISIT (OUTPATIENT)
Dept: DERMATOLOGY | Facility: CLINIC | Age: 43
End: 2018-09-26
Payer: COMMERCIAL

## 2018-09-26 ENCOUNTER — TELEPHONE (OUTPATIENT)
Dept: DERMATOLOGY | Facility: CLINIC | Age: 43
End: 2018-09-26

## 2018-09-26 VITALS — SYSTOLIC BLOOD PRESSURE: 123 MMHG | DIASTOLIC BLOOD PRESSURE: 85 MMHG | HEART RATE: 97 BPM | OXYGEN SATURATION: 96 %

## 2018-09-26 DIAGNOSIS — D18.00 ANGIOMA: ICD-10-CM

## 2018-09-26 DIAGNOSIS — D23.9 DERMAL NEVUS: ICD-10-CM

## 2018-09-26 DIAGNOSIS — C43.9 METASTATIC MELANOMA (H): Primary | ICD-10-CM

## 2018-09-26 DIAGNOSIS — L90.5 SCAR: ICD-10-CM

## 2018-09-26 DIAGNOSIS — L81.4 LENTIGO: ICD-10-CM

## 2018-09-26 DIAGNOSIS — L82.1 SK (SEBORRHEIC KERATOSIS): ICD-10-CM

## 2018-09-26 PROCEDURE — 67810 INCAL BX EYELID SKN LID MRGN: CPT | Performed by: DERMATOLOGY

## 2018-09-26 PROCEDURE — 99213 OFFICE O/P EST LOW 20 MIN: CPT | Mod: 25 | Performed by: DERMATOLOGY

## 2018-09-26 PROCEDURE — 88331 PATH CONSLTJ SURG 1 BLK 1SPC: CPT | Performed by: DERMATOLOGY

## 2018-09-26 RX ORDER — GABAPENTIN 300 MG/1
CAPSULE ORAL
COMMUNITY
Start: 2018-09-24 | End: 2018-11-13

## 2018-09-26 NOTE — PATIENT INSTRUCTIONS
Wound Care Instructions     FOR SUPERFICIAL WOUNDS     [facility] [phone]    [facility] [phone]                       AFTER 24 HOURS YOU SHOULD REMOVE THE BANDAGE AND BEGIN DAILY DRESSING CHANGES AS FOLLOWS:     1) Remove Dressing.     2) Clean and dry the area with tap water using a Q-tip or sterile gauze pad.     3) Apply Vaseline, Aquaphor, Polysporin ointment or Bacitracin ointment over entire wound.  Do NOT use Neosporin ointment.     4) Cover the wound with a band-aid, or a sterile non-stick gauze pad and micropore paper tape      REPEAT THESE INSTRUCTIONS AT LEAST ONCE A DAY UNTIL THE WOUND HAS COMPLETELY HEALED.    It is an old wives tale that a wound heals better when it is exposed to air and allowed to dry out. The wound will heal faster with a better cosmetic result if it is kept moist with ointment and covered with a bandage.    **Do not let the wound dry out.**      Supplies Needed:      *Cotton tipped applicators (Q-tips)    *Polysporin Ointment or Bacitracin Ointment (NOT NEOSPORIN)    *Band-aids or non-stick gauze pads and micropore paper tape.      PATIENT INFORMATION:    During the healing process you will notice a number of changes. All wounds develop a small halo of redness surrounding the wound.  This means healing is occurring. Severe itching with extensive redness usually indicates sensitivity to the ointment or bandage tape used to dress the wound.  You should call our office if this develops.      Swelling  and/or discoloration around your surgical site is common, particularly when performed around the eye.    All wounds normally drain.  The larger the wound the more drainage there will be.  After 7-10 days, you will notice the wound beginning to shrink and new skin will begin to grow.  The wound is healed when you can see skin has formed over the entire area.  A healed wound has a healthy, shiny look to the surface and is red to dark pink in color  to normalize.  Wounds may take approximately 4-6 weeks to heal.  Larger wounds may take 6-8 weeks.  After the wound is healed you may discontinue dressing changes.    You may experience a sensation of tightness as your wound heals. This is normal and will gradually subside.    Your healed wound may be sensitive to temperature changes. This sensitivity improves with time, but if you re having a lot of discomfort, try to avoid temperature extremes.    Patients frequently experience itching after their wound appears to have healed because of the continue healing under the skin.  Plain Vaseline will help relieve the itching.        POSSIBLE COMPLICATIONS    BLEEDIN. Leave the bandage in place.  2. Use tightly rolled up gauze or a cloth to apply direct pressure over the bandage for 30  minutes.  3. Reapply pressure for an additional 30 minutes if necessary  4. Use additional gauze and tape to maintain pressure once the bleeding has stopped.

## 2018-09-26 NOTE — PROGRESS NOTES
Doretha Fernnadez is a 43 year old year old female patient here today for f/u h xof melanoma unknown primary.  She went to Dr. Cool for node dissection.1 out of 20 nodes positive.  No adjuvant Radiation given no survvial advantage.   MRI of brain and PET scan both clear.  Was on  Nivolumab got 8 injection but got colitis.  She went to [facility].   She was treated with high dose steroids for colitis and had a dx of bacterial pneumonia.   She was admitted for to hospital for this. She is followed by GI and is on pred taper.  She was started on Entyvio.  The goal would be to restart Nivo once her bowel is better.   She denies any new or changing skin lesions.  Patient reports the following modifying factors none.  Associated symptoms: none.  Patient has no other skin complaints today.  Remainder of the HPI, Meds, PMH, Allergies, FH, and SH was reviewed in chart.  Current PET-CT scan, physical exam and laboratory tests demonstrate no evidence of melanoma recurrence.  She nots a new spot where she had bc con left upper lid.      Pertinent Hx:                Melanoma metastatic      Past Medical History               Past Medical History:   Diagnosis Date     Abnormal MRI         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      Anxiety        Depression        Lumbago         left lower back pain     Malignant melanoma (H)        Mild persistent asthma        Prothrombin deficiency (H)         takes 81mg asa daily     Stroke (cerebrum) (H)       During      TIA (transient ischemic attack)                    Past Surgical History                  Past Surgical History:   Procedure Laterality Date     COLONOSCOPY N/A 10/18/2017      Procedure: COLONOSCOPY;  Colon;  Surgeon: Debbie Stephens MD;  Location: [facility] OR     DISSECT LYMPH NODE AXILLA Left 10/23/2017      Procedure: DISSECT LYMPH NODE AXILLA;  Left Axillary Lymph Node Dissection ;  Surgeon: Laurent Cool MD;  Location: [facility] OR     GYN SURGERY                  REPAIR MOHS Left 2017      Procedure: REPAIR MOHS;  Left Upper Lid Moh's Reconstruction;  Surgeon: Kisha Bosch MD;  Location:  OR                   Family History                     Family History   Problem Relation Age of Onset     CANCER Mother 45         lung     Neurologic Disorder Mother        Lipids Father        GASTROINTESTINAL DISEASE Father        Depression Father        CANCER Maternal Grandmother        Blood Disease Maternal Grandmother        Arthritis Maternal Grandmother        DIABETES Maternal Grandmother        Depression Maternal Grandmother        Macular Degeneration Maternal Grandmother        Glaucoma Maternal Grandmother        DIABETES Maternal Grandfather        CEREBROVASCULAR DISEASE Maternal Grandfather        Blood Disease Maternal Grandfather        HEART DISEASE Maternal Grandfather        Glaucoma Maternal Grandfather        CANCER Paternal Grandmother        Cancer - colorectal Paternal Grandmother        Respiratory Paternal Grandfather        Blood Disease Paternal Grandfather        HEART DISEASE Daughter        Asthma Daughter        Depression Sister                      Social History    Social History                     Social History     Marital status:          Spouse name: N/A     Number of children: N/A     Years of education: N/A               Occupational History     Not on file.                        Social History Main Topics     Smoking status: Passive Smoke Exposure - Never Smoker         Last attempt to quit: 3/20/1998     Smokeless tobacco: Never Used     Alcohol use No            Comment: occ     Drug use: No     Sexual activity: Yes         Partners: Male         Birth control/ protection: Surgical                  Other Topics Concern     Parent/Sibling W/ Cabg, Mi Or Angioplasty Before 65f 55m? No               Social History Narrative      20 y.o- patient's mother   of lung cancer. She had to take care of her  "younger sister.      7/2012- patient's  had a heart attack with stents placed, followed by cardiac rehabilitation      November 2000 TO December 2012  was in [facility] for depression      January 2013 patient's  went through alcohol rehabilitation at [facility]                      They have attended couple counseling a couple of times and patient went to the family program for chemical dependency.      Patient denies alcohol or drug use and herself                      Has 2 children, girls ages 5 and 8      [employer] and also works for the [employer]. 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                                 Encounter Medications                Outpatient Encounter Prescriptions as of 1/9/2018   Medication Sig Dispense Refill     venlafaxine (EFFEXOR-ER) 150 MG TB24 24 hr tablet Take 1 tablet (150 mg) by mouth daily (with breakfast) 30 each 0     order for DME Equipment being ordered: 20-30mmHg compression sleeve and 20-30mmHg compression glove\" x2 pair 1 Units 0     rOPINIRole (REQUIP) 0.25 MG tablet Take 1 tablet (0.25 mg) by mouth At Bedtime 30 tablet 11     cephALEXin (KEFLEX) 500 MG capsule Take 2 capsules (1,000 mg) by mouth 2 times daily 24 capsule 0     oxyCODONE IR (ROXICODONE) 5 MG tablet Take 1-3 tablets (5-15 mg) by mouth every 4 hours as needed for pain maximum 12 tablet(s) per day 40 tablet 0     aspirin 81 MG EC tablet Take 81 mg by mouth daily           acetaminophen (TYLENOL) 325 MG tablet Take 2 tablets (650 mg) by mouth every 4 hours as needed for other (mild pain) 100 tablet 0     senna-docusate (SENOKOT-S;PERICOLACE) 8.6-50 MG per tablet Take 1 tablet by mouth 2 times daily as needed for constipation 60 tablet 1     LORazepam (ATIVAN) 1 MG tablet Take 1 tablet (1 mg) by mouth every 8 hours as needed for anxiety 30 tablet 0     Cholecalciferol (VITAMIN D3) 3000 UNITS TABS Take 3,000 Int'l Units by " mouth daily 30 tablet 3     zonisamide (ZONEGRAN) 25 MG capsule Take 1 tablet (25 mg) once daily for 1 week, then 2 tablets once daily for 1 week, then 3 tablets once daily for 1 week, then 4 tablets once daily for 1 week. 90 capsule 1                                 Facility-Administered Encounter Medications as of 1/9/2018   Medication Dose Route Frequency Provider Last Rate Last Dose     lidocaine 1 % 9 mL  9 mL Intradermal Once Anna Naidu MD           sodium bicarbonate 8.4 % injection 1 mEq  1 mEq Intradermal Once Anna Naidu MD                                Review Of Systems  Skin: As above  Eyes: negative  Ears/Nose/Throat: negative  Respiratory: No shortness of breath, dyspnea on exertion, cough, or hemoptysis  Cardiovascular: negative  Gastrointestinal: negative  Genitourinary: negative  Musculoskeletal: negative  Neurologic: negative  Psychiatric: negative  Hematologic/Lymphatic/Immunologic: negative  Endocrine: negative          O:                                   NAD, WDWN, Alert & Oriented, Mood & Affect wnl, Vitals stable                                         Here today alone                                         /74 (BP Location: Left arm, Patient Position: Sitting, Cuff Size: Adult Large)  Pulse 79  Temp 98.7  F (37.1  C) (Tympanic)                                         General appearance normal                                         Vitals stable                                         Alert, oriented and in no acute distress                                             Following lymph nodes palpated: Occipital, Cervical, Supraclavicular , axilla, inguinal, femoral no lad                                         Stuck on papules and brown macules on trunk and ext                                          Pink papules on trunk                           Red papules on trunk    L upper lid scar slight scaly papule               The remainder of the full exam was unremarkable;  the following areas were examined:  conjunctiva/lids, oral mucosa, neck, peripheral vascular system, abdomen, lymph nodes, digits/nails, eccrine and apocrine glands, scalp/hair, face, neck, chest, abdomen, buttocks, back, RUE, LUE, RLE, LLE                                                         Eyes: Conjunctivae/lids:Normal                                                     ENT: Lips, buccal mucosa, tongue: normal                                                    MSK:Normal                                                    Cardiovascular: peripheral edema none                                                    Pulm: Breathing Normal                                                    Lymph Nodes: No Head and Neck Lymphadenopathy                                                     Neuro/Psych: Orientation:Normal; Mood/Affect:Normal        MICRO:  L upper lid:Unremarkable epidermis with parallel bundles of cellular collagen within the superficial dermis.  No concerning areas for malignancy.       A/P:  1. Metastatic melanoma, seborrheic keratosis, lentigo, dermal nevi, angioma  2. L upper lid r/o recurrent basal cell carcinoma   TANGENTIAL BIOPSY IN HOUSE:  After consent, anesthesia with LEC and prep, tangential excision performed and dx above confirmed with frozen section histology.  No complications and routine wound care.  Patient told result scar no signs of basal cell carcinoma .      MELANOMA DISCUSSED WITH PATIENT:  I discussed the specifics of tumor, prognosis, metachronous melanoma, self exam, and genetics with the patient. I explained the need for monthly skin exams including and taught the patient how to do this. Patient was asked about new or changing moles and a full skin exam was performed.   BENIGN LESIONS DISCUSSED WITH PATIENT:  I discussed the specifics of tumor, prognosis, and genetics of benign lesions.  I explained that treatment of these lesions would be purely cosmetic and not medically  neccessary.  I discussed with patient different removal options including excision, cautery and /or laser.        Nature and genetics of benign skin lesions dicussed with patient.  Signs and Symptoms of skin cancer discussed with patient.  ABCDEs of melanoma reviewed with patient.  Patient encouraged to perform monthly skin exams.  UV precautions reviewed with patient.  Skin care regimen reviewed with patient: Eliminate harsh soaps, i.e. Dial, zest, irsih spring; Mild soaps such as Cetaphil or Dove sensitive skin, avoid hot or cold showers, aggressive use of emollients including vanicream, cetaphil or cerave discussed with patient.    Risks of non-melanoma skin cancer discussed with patient   Return to clinic 3 months

## 2018-09-26 NOTE — MR AVS SNAPSHOT
After Visit Summary   9/26/2018    Doretha Fernandez    MRN: [id number]           Patient Information     Date Of Birth          1975        Visit Information        Provider Department      9/26/2018 2:00 PM Lowell Bullock MD [facility]        Today's Diagnoses     Metastatic melanoma (H)    -  1    SK (seborrheic keratosis)        Lentigo        Dermal nevus        Angioma          Care Instructions          Wound Care Instructions     FOR SUPERFICIAL WOUNDS     [facility] [phone]    [facility] [phone]                       AFTER 24 HOURS YOU SHOULD REMOVE THE BANDAGE AND BEGIN DAILY DRESSING CHANGES AS FOLLOWS:     1) Remove Dressing.     2) Clean and dry the area with tap water using a Q-tip or sterile gauze pad.     3) Apply Vaseline, Aquaphor, Polysporin ointment or Bacitracin ointment over entire wound.  Do NOT use Neosporin ointment.     4) Cover the wound with a band-aid, or a sterile non-stick gauze pad and micropore paper tape      REPEAT THESE INSTRUCTIONS AT LEAST ONCE A DAY UNTIL THE WOUND HAS COMPLETELY HEALED.    It is an old wives tale that a wound heals better when it is exposed to air and allowed to dry out. The wound will heal faster with a better cosmetic result if it is kept moist with ointment and covered with a bandage.    **Do not let the wound dry out.**      Supplies Needed:      *Cotton tipped applicators (Q-tips)    *Polysporin Ointment or Bacitracin Ointment (NOT NEOSPORIN)    *Band-aids or non-stick gauze pads and micropore paper tape.      PATIENT INFORMATION:    During the healing process you will notice a number of changes. All wounds develop a small halo of redness surrounding the wound.  This means healing is occurring. Severe itching with extensive redness usually indicates sensitivity to the ointment or bandage tape used to dress the wound.  You should call our office if this develops.      Swelling   and/or discoloration around your surgical site is common, particularly when performed around the eye.    All wounds normally drain.  The larger the wound the more drainage there will be.  After 7-10 days, you will notice the wound beginning to shrink and new skin will begin to grow.  The wound is healed when you can see skin has formed over the entire area.  A healed wound has a healthy, shiny look to the surface and is red to dark pink in color to normalize.  Wounds may take approximately 4-6 weeks to heal.  Larger wounds may take 6-8 weeks.  After the wound is healed you may discontinue dressing changes.    You may experience a sensation of tightness as your wound heals. This is normal and will gradually subside.    Your healed wound may be sensitive to temperature changes. This sensitivity improves with time, but if you re having a lot of discomfort, try to avoid temperature extremes.    Patients frequently experience itching after their wound appears to have healed because of the continue healing under the skin.  Plain Vaseline will help relieve the itching.        POSSIBLE COMPLICATIONS    BLEEDIN. Leave the bandage in place.  2. Use tightly rolled up gauze or a cloth to apply direct pressure over the bandage for 30  minutes.  3. Reapply pressure for an additional 30 minutes if necessary  4. Use additional gauze and tape to maintain pressure once the bleeding has stopped.            Follow-ups after your visit        Who to contact     If you have questions or need follow up information about today's clinic visit or your schedule please contact [facility] directly at [phone].  Normal or non-critical lab and imaging results will be communicated to you by MyChart, letter or phone within 4 business days after the clinic has received the results. If you do not hear from us within 7 days, please contact the clinic through MyChart or phone. If you have a critical or abnormal lab result, we  will notify you by phone as soon as possible.  Submit refill requests through [employer] or call your pharmacy and they will forward the refill request to us. Please allow 3 business days for your refill to be completed.          Additional Information About Your Visit        [employer] Information     [employer] gives you secure access to your electronic health record. If you see a primary care provider, you can also send messages to your care team and make appointments. If you have questions, please call your primary care clinic.  If you do not have a primary care provider, please call [phone] and they will assist you.        Care EveryWhere ID     This is your Care EveryWhere ID. This could be used by other organizations to access your [place] medical records  [id number]        Your Vitals Were     Pulse Pulse Oximetry                97 96%           Blood Pressure from Last 3 Encounters:   09/26/18 123/85   06/13/18 118/79   06/12/18 113/72    Weight from Last 3 Encounters:   06/13/18 94.3 kg (208 lb)   05/09/18 93.4 kg (206 lb)   05/09/18 96.9 kg (213 lb 10 oz)              Today, you had the following     No orders found for display         Today's Medication Changes          These changes are accurate as of 9/26/18  2:55 PM.  If you have any questions, ask your nurse or doctor.               These medicines have changed or have updated prescriptions.        Dose/Directions    venlafaxine 150 MG Tb24 24 hr tablet   Commonly known as:  EFFEXOR-ER   This may have changed:  how much to take   Used for:  Mild major depression (H)        Dose:  150 mg   Take 1 tablet (150 mg) by mouth daily (with breakfast)   Quantity:  90 each   Refills:  1                Primary Care Provider Office Phone # Fax #    Jani Cole [phone] [address] [facility] [address]        Equal Access to Services     BRIGETTE QUARLES : melba Valenzuela qaybta kaalmada adeegyada, waxay  bernice schulzlula la'aan ah. So [facility] [phone].    ATENCIÓN: Si jessicala heidi, tiene a mcdonnell disposición servicios gratuitos de asistencia lingüística. Diana cole [phone].    We comply with applicable federal civil rights laws and [place] laws. We do not discriminate on the basis of race, color, national origin, age, disability, sex, sexual orientation, or gender identity.            Thank you!     Thank you for choosing [facility]  for your care. Our goal is always to provide you with excellent care. Hearing back from our patients is one way we can continue to improve our services. Please take a few minutes to complete the written survey that you may receive in the mail after your visit with us. Thank you!             Your Updated Medication List - Protect others around you: Learn how to safely use, store and throw away your medicines at www.disposemymeds.org.          This list is accurate as of 9/26/18  2:55 PM.  Always use your most recent med list.                   Brand Name Dispense Instructions for use Diagnosis    budesonide 3 MG EC capsule    ENTOCORT EC     Take 12 mg by mouth        cholestyramine 4 g Packet    QUESTRAN     Take 1 packet by mouth 3 times daily (with meals)        gabapentin 300 MG capsule    NEURONTIN     1 tablet three times daily for 1 week then increase to 2 tablets three times daily        HYDROmorphone 4 MG tablet    DILAUDID    30 tablet    Take 1-1.5 tablets (4-6 mg) by mouth every 3 hours as needed for moderate to severe pain    Proctocolitis       loperamide 2 MG capsule    IMODIUM     Take 2 mg by mouth 4 times daily as needed for diarrhea        LORazepam 0.5 MG tablet    ATIVAN    10 tablet    Take 1-2 tablets (0.5-1 mg) by mouth every 6 hours as needed for anxiety or other (nausea)    Proctocolitis, Anxiety state, Metastatic malignant melanoma (H)       * order for DME     1 Units    Equipment being ordered: 20-30mmHg compression sleeve and  "20-30mmHg compression glove\" x2 pair    Lymphedema of left arm       * order for DME     1 each    Equipment being ordered: x2 Wearease compression shirt    Secondary lymphedema       polyethylene glycol Packet    MIRALAX/GLYCOLAX     Take 1 packet by mouth daily        predniSONE 10 MG tablet    DELTASONE    100 tablet    Take 2 tablets (20 mg) by mouth daily    Malignant melanoma of left upper extremity including shoulder (H), Colitis       promethazine 25 MG tablet    PHENERGAN    56 tablet    Take 1 tablet (25 mg) by mouth every 4 hours as needed for nausea or vomiting    Proctocolitis       rOPINIRole 0.25 MG tablet    REQUIP    30 tablet    Take 1 tablet (0.25 mg) by mouth At Bedtime    Restless leg syndrome       sulfamethoxazole-trimethoprim 400-80 MG per tablet    BACTRIM/SEPTRA     Take 1 tablet by mouth daily While on high dose steroids (prednisone 20mg or greater)        TYLENOL PO      Take 1,000 mg by mouth every 8 hours as needed for mild pain or fever        venlafaxine 150 MG Tb24 24 hr tablet    EFFEXOR-ER    90 each    Take 1 tablet (150 mg) by mouth daily (with breakfast)    Mild major depression (H)       Vitamin D3 3000 units Tabs     90 tablet    Take 3,000 Int'l Units by mouth daily    Vitamin D deficiency       ZOFRAN 8 MG tablet   Generic drug:  ondansetron      Take by mouth every 8 hours as needed for nausea        * Notice:  This list has 2 medication(s) that are the same as other medications prescribed for you. Read the directions carefully, and ask your doctor or other care provider to review them with you.      "

## 2018-09-26 NOTE — LETTER
2018         RE: Doretha Fernandez  [address]  [place] [address]        Dear Colleague,    Thank you for referring your patient, Doretha Fernandez, to the [facility]. Please see a copy of my visit note below.    Doretha Fernandez is a 43 year old year old female patient here today for f/u h xof melanoma unknown primary.  She went to Dr. Cool for node dissection.1 out of 20 nodes positive.  No adjuvant Radiation given no survvial advantage.   MRI of brain and PET scan both clear.  Was on  Nivolumab got 8 injection but got colitis.  She went to [place].   She was treated with high dose steroids for colitis and had a dx of bacterial pneumonia.   She was admitted for to hospital for this. She is followed by GI and is on pred taper.  She was started on Entyvio.  The goal would be to restart Nivo once her bowel is better.   She denies any new or changing skin lesions.  Patient reports the following modifying factors none.  Associated symptoms: none.  Patient has no other skin complaints today.  Remainder of the HPI, Meds, PMH, Allergies, FH, and SH was reviewed in chart.  Current PET-CT scan, physical exam and laboratory tests demonstrate no evidence of melanoma recurrence.  She nots a new spot where she had bc con left upper lid.      Pertinent Hx:                Melanoma metastatic      Past Medical History               Past Medical History:   Diagnosis Date     Abnormal MRI         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      Anxiety        Depression        Lumbago         left lower back pain     Malignant melanoma (H)        Mild persistent asthma        Prothrombin deficiency (H)         takes 81mg asa daily     Stroke (cerebrum) (H)       During      TIA (transient ischemic attack)                    Past Surgical History                  Past Surgical History:   Procedure Laterality Date     COLONOSCOPY N/A 10/18/2017      Procedure: COLONOSCOPY;  Colon;   Surgeon: Debbie Stephens MD;  Location: [facility] OR     DISSECT LYMPH NODE AXILLA Left 10/23/2017      Procedure: DISSECT LYMPH NODE AXILLA;  Left Axillary Lymph Node Dissection ;  Surgeon: Laurent Cool MD;  Location: [facility] OR     GYN SURGERY                 REPAIR MOHS Left 2017      Procedure: REPAIR MOHS;  Left Upper Lid Moh's Reconstruction;  Surgeon: Kisha Bosch MD;  Location: [facility] OR                   Family History                     Family History   Problem Relation Age of Onset     CANCER Mother 45         lung     Neurologic Disorder Mother        Lipids Father        GASTROINTESTINAL DISEASE Father        Depression Father        CANCER Maternal Grandmother        Blood Disease Maternal Grandmother        Arthritis Maternal Grandmother        DIABETES Maternal Grandmother        Depression Maternal Grandmother        Macular Degeneration Maternal Grandmother        Glaucoma Maternal Grandmother        DIABETES Maternal Grandfather        CEREBROVASCULAR DISEASE Maternal Grandfather        Blood Disease Maternal Grandfather        HEART DISEASE Maternal Grandfather        Glaucoma Maternal Grandfather        CANCER Paternal Grandmother        Cancer - colorectal Paternal Grandmother        Respiratory Paternal Grandfather        Blood Disease Paternal Grandfather        HEART DISEASE Daughter        Asthma Daughter        Depression Sister                      Social History    Social History                     Social History     Marital status:          Spouse name: N/A     Number of children: N/A     Years of education: N/A               Occupational History     Not on file.                        Social History Main Topics     Smoking status: Passive Smoke Exposure - Never Smoker         Last attempt to quit: 3/20/1998     Smokeless tobacco: Never Used     Alcohol use No            Comment: occ     Drug use: No     Sexual activity: Yes         Partners: Male         Birth control/  "protection: Surgical                  Other Topics Concern     Parent/Sibling W/ Cabg, Mi Or Angioplasty Before 65f 55m? No               Social History Narrative      20 y.o- patient's mother   of lung cancer. She had to take care of her younger sister.      2012- patient's  had a heart attack with stents placed, followed by cardiac rehabilitation      2000 TO 2012  was in [facility] for depression      2013 patient's  went through alcohol rehabilitation at [place] inpatient                      They have attended couple counseling a couple of times and patient went to the family program for chemical dependency.      Patient denies alcohol or drug use and herself                      Has 2 children, girls ages 5 and 8      [place] [employer] and also works for the [employer]. 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                                 Encounter Medications                Outpatient Encounter Prescriptions as of 2018   Medication Sig Dispense Refill     venlafaxine (EFFEXOR-ER) 150 MG TB24 24 hr tablet Take 1 tablet (150 mg) by mouth daily (with breakfast) 30 each 0     order for DME Equipment being ordered: 20-30mmHg compression sleeve and 20-30mmHg compression glove\" x2 pair 1 Units 0     rOPINIRole (REQUIP) 0.25 MG tablet Take 1 tablet (0.25 mg) by mouth At Bedtime 30 tablet 11     cephALEXin (KEFLEX) 500 MG capsule Take 2 capsules (1,000 mg) by mouth 2 times daily 24 capsule 0     oxyCODONE IR (ROXICODONE) 5 MG tablet Take 1-3 tablets (5-15 mg) by mouth every 4 hours as needed for pain maximum 12 tablet(s) per day 40 tablet 0     aspirin 81 MG EC tablet Take 81 mg by mouth daily           acetaminophen (TYLENOL) 325 MG tablet Take 2 tablets (650 mg) by mouth every 4 hours as needed for other (mild pain) 100 tablet 0     senna-docusate (SENOKOT-S;PERICOLACE) 8.6-50 MG per tablet Take 1 tablet by mouth " 2 times daily as needed for constipation 60 tablet 1     LORazepam (ATIVAN) 1 MG tablet Take 1 tablet (1 mg) by mouth every 8 hours as needed for anxiety 30 tablet 0     Cholecalciferol (VITAMIN D3) 3000 UNITS TABS Take 3,000 Int'l Units by mouth daily 30 tablet 3     zonisamide (ZONEGRAN) 25 MG capsule Take 1 tablet (25 mg) once daily for 1 week, then 2 tablets once daily for 1 week, then 3 tablets once daily for 1 week, then 4 tablets once daily for 1 week. 90 capsule 1                                 Facility-Administered Encounter Medications as of 1/9/2018   Medication Dose Route Frequency Provider Last Rate Last Dose     lidocaine 1 % 9 mL  9 mL Intradermal Once Anna Naidu MD           sodium bicarbonate 8.4 % injection 1 mEq  1 mEq Intradermal Once Anna Naidu MD                                Review Of Systems  Skin: As above  Eyes: negative  Ears/Nose/Throat: negative  Respiratory: No shortness of breath, dyspnea on exertion, cough, or hemoptysis  Cardiovascular: negative  Gastrointestinal: negative  Genitourinary: negative  Musculoskeletal: negative  Neurologic: negative  Psychiatric: negative  Hematologic/Lymphatic/Immunologic: negative  Endocrine: negative          O:                                   NAD, WDWN, Alert & Oriented, Mood & Affect wnl, Vitals stable                                         Here today alone                                         /74 (BP Location: Left arm, Patient Position: Sitting, Cuff Size: Adult Large)  Pulse 79  Temp 98.7  F (37.1  C) (Tympanic)                                         General appearance normal                                         Vitals stable                                         Alert, oriented and in no acute distress                                             Following lymph nodes palpated: Occipital, Cervical, Supraclavicular , axilla, inguinal, femoral no lad                                         Stuck on papules and  brown macules on trunk and ext                                          Pink papules on trunk                           Red papules on trunk    L upper lid scar slight scaly papule               The remainder of the full exam was unremarkable; the following areas were examined:  conjunctiva/lids, oral mucosa, neck, peripheral vascular system, abdomen, lymph nodes, digits/nails, eccrine and apocrine glands, scalp/hair, face, neck, chest, abdomen, buttocks, back, RUE, LUE, RLE, LLE                                                         Eyes: Conjunctivae/lids:Normal                                                     ENT: Lips, buccal mucosa, tongue: normal                                                    MSK:Normal                                                    Cardiovascular: peripheral edema none                                                    Pulm: Breathing Normal                                                    Lymph Nodes: No Head and Neck Lymphadenopathy                                                     Neuro/Psych: Orientation:Normal; Mood/Affect:Normal        MICRO:  L upper lid:Unremarkable epidermis with parallel bundles of cellular collagen within the superficial dermis.  No concerning areas for malignancy.       A/P:  1. Metastatic melanoma, seborrheic keratosis, lentigo, dermal nevi, angioma  2. L upper lid r/o recurrent basal cell carcinoma   TANGENTIAL BIOPSY IN HOUSE:  After consent, anesthesia with LEC and prep, tangential excision performed and dx above confirmed with frozen section histology.  No complications and routine wound care.  Patient told result scar no signs of basal cell carcinoma .      MELANOMA DISCUSSED WITH PATIENT:  I discussed the specifics of tumor, prognosis, metachronous melanoma, self exam, and genetics with the patient. I explained the need for monthly skin exams including and taught the patient how to do this. Patient was asked about new or changing moles and a  full skin exam was performed.   BENIGN LESIONS DISCUSSED WITH PATIENT:  I discussed the specifics of tumor, prognosis, and genetics of benign lesions.  I explained that treatment of these lesions would be purely cosmetic and not medically neccessary.  I discussed with patient different removal options including excision, cautery and /or laser.        Nature and genetics of benign skin lesions dicussed with patient.  Signs and Symptoms of skin cancer discussed with patient.  ABCDEs of melanoma reviewed with patient.  Patient encouraged to perform monthly skin exams.  UV precautions reviewed with patient.  Skin care regimen reviewed with patient: Eliminate harsh soaps, i.e. Dial, zest, irsih spring; Mild soaps such as Cetaphil or Dove sensitive skin, avoid hot or cold showers, aggressive use of emollients including vanicream, cetaphil or cerave discussed with patient.    Risks of non-melanoma skin cancer discussed with patient   Return to clinic 3 months      Again, thank you for allowing me to participate in the care of your patient.        Sincerely,        Lowell Bullock MD

## 2018-09-26 NOTE — TELEPHONE ENCOUNTER
----- Message from Lowell Bullock MD sent at 9/26/2018  3:04 PM CDT -----  L upper eyelid scar no signs of basal cell carcinoma

## 2018-09-26 NOTE — TELEPHONE ENCOUNTER
Left message on identified voicemail that biopsy was a scar and no evidence of BCC. Advised pt to call if she had questions.  Guerita MELENDEZ RN BSN PHN  Specialty Clinics

## 2018-11-13 ENCOUNTER — HOSPITAL ENCOUNTER (EMERGENCY)
Facility: CLINIC | Age: 43
Discharge: HOME | End: 2018-11-13
Attending: EMERGENCY MEDICINE | Admitting: EMERGENCY MEDICINE
Payer: COMMERCIAL

## 2018-11-13 VITALS
OXYGEN SATURATION: 91 % | TEMPERATURE: 97.9 F | BODY MASS INDEX: 36.85 KG/M2 | DIASTOLIC BLOOD PRESSURE: 68 MMHG | HEIGHT: 63 IN | RESPIRATION RATE: 22 BRPM | SYSTOLIC BLOOD PRESSURE: 108 MMHG

## 2018-11-13 DIAGNOSIS — M25.50 ARTHRALGIA OF MULTIPLE JOINTS: ICD-10-CM

## 2018-11-13 DIAGNOSIS — F41.9 ANXIETY: ICD-10-CM

## 2018-11-13 DIAGNOSIS — G89.4 CHRONIC PAIN SYNDROME: ICD-10-CM

## 2018-11-13 LAB
ALBUMIN SERPL-MCNC: 3.8 G/DL (ref 3.4–5)
ALP SERPL-CCNC: 61 U/L (ref 40–150)
ALT SERPL W P-5'-P-CCNC: 29 U/L (ref 0–50)
ANION GAP SERPL CALCULATED.3IONS-SCNC: 9 MMOL/L (ref 3–14)
AST SERPL W P-5'-P-CCNC: 18 U/L (ref 0–45)
BASOPHILS # BLD AUTO: 0.1 10E9/L (ref 0–0.2)
BASOPHILS NFR BLD AUTO: 0.5 %
BILIRUB SERPL-MCNC: 0.2 MG/DL (ref 0.2–1.3)
BUN SERPL-MCNC: 10 MG/DL (ref 7–30)
CALCIUM SERPL-MCNC: 9 MG/DL (ref 8.5–10.1)
CHLORIDE SERPL-SCNC: 106 MMOL/L (ref 94–109)
CO2 SERPL-SCNC: 24 MMOL/L (ref 20–32)
CREAT SERPL-MCNC: 0.64 MG/DL (ref 0.52–1.04)
DIFFERENTIAL METHOD BLD: ABNORMAL
EOSINOPHIL # BLD AUTO: 0.1 10E9/L (ref 0–0.7)
EOSINOPHIL NFR BLD AUTO: 0.7 %
ERYTHROCYTE [DISTWIDTH] IN BLOOD BY AUTOMATED COUNT: 14.4 % (ref 10–15)
ERYTHROCYTE [SEDIMENTATION RATE] IN BLOOD BY WESTERGREN METHOD: 14 MM/H (ref 0–20)
GFR SERPL CREATININE-BSD FRML MDRD: >90 ML/MIN/1.7M2
GLUCOSE SERPL-MCNC: 149 MG/DL (ref 70–99)
HCT VFR BLD AUTO: 38.2 % (ref 35–47)
HGB BLD-MCNC: 12.2 G/DL (ref 11.7–15.7)
IMM GRANULOCYTES # BLD: 0.1 10E9/L (ref 0–0.4)
IMM GRANULOCYTES NFR BLD: 0.9 %
LYMPHOCYTES # BLD AUTO: 1.6 10E9/L (ref 0.8–5.3)
LYMPHOCYTES NFR BLD AUTO: 10.4 %
MCH RBC QN AUTO: 27.2 PG (ref 26.5–33)
MCHC RBC AUTO-ENTMCNC: 31.9 G/DL (ref 31.5–36.5)
MCV RBC AUTO: 85 FL (ref 78–100)
MONOCYTES # BLD AUTO: 0.5 10E9/L (ref 0–1.3)
MONOCYTES NFR BLD AUTO: 3.4 %
NEUTROPHILS # BLD AUTO: 12.8 10E9/L (ref 1.6–8.3)
NEUTROPHILS NFR BLD AUTO: 84.1 %
NRBC # BLD AUTO: 0 10*3/UL
NRBC BLD AUTO-RTO: 0 /100
PLATELET # BLD AUTO: 375 10E9/L (ref 150–450)
POTASSIUM SERPL-SCNC: 3.9 MMOL/L (ref 3.4–5.3)
PROT SERPL-MCNC: 7.5 G/DL (ref 6.8–8.8)
RBC # BLD AUTO: 4.48 10E12/L (ref 3.8–5.2)
SODIUM SERPL-SCNC: 139 MMOL/L (ref 133–144)
WBC # BLD AUTO: 15.3 10E9/L (ref 4–11)

## 2018-11-13 PROCEDURE — 99285 EMERGENCY DEPT VISIT HI MDM: CPT | Mod: 25 | Performed by: EMERGENCY MEDICINE

## 2018-11-13 PROCEDURE — 96374 THER/PROPH/DIAG INJ IV PUSH: CPT | Performed by: EMERGENCY MEDICINE

## 2018-11-13 PROCEDURE — 85025 COMPLETE CBC W/AUTO DIFF WBC: CPT | Performed by: EMERGENCY MEDICINE

## 2018-11-13 PROCEDURE — 85652 RBC SED RATE AUTOMATED: CPT | Performed by: EMERGENCY MEDICINE

## 2018-11-13 PROCEDURE — 25000132 ZZH RX MED GY IP 250 OP 250 PS 637: Performed by: EMERGENCY MEDICINE

## 2018-11-13 PROCEDURE — 99285 EMERGENCY DEPT VISIT HI MDM: CPT | Mod: Z6 | Performed by: EMERGENCY MEDICINE

## 2018-11-13 PROCEDURE — 25000128 H RX IP 250 OP 636: Performed by: EMERGENCY MEDICINE

## 2018-11-13 PROCEDURE — 96375 TX/PRO/DX INJ NEW DRUG ADDON: CPT | Performed by: EMERGENCY MEDICINE

## 2018-11-13 PROCEDURE — 80053 COMPREHEN METABOLIC PANEL: CPT | Performed by: EMERGENCY MEDICINE

## 2018-11-13 RX ORDER — DIPHENHYDRAMINE HYDROCHLORIDE 50 MG/ML
50 INJECTION INTRAMUSCULAR; INTRAVENOUS ONCE
Status: COMPLETED | OUTPATIENT
Start: 2018-11-13 | End: 2018-11-13

## 2018-11-13 RX ORDER — LORAZEPAM 2 MG/ML
.5-1 INJECTION INTRAMUSCULAR ONCE
Status: DISCONTINUED | OUTPATIENT
Start: 2018-11-13 | End: 2018-11-13

## 2018-11-13 RX ORDER — SODIUM CHLORIDE 9 MG/ML
1000 INJECTION, SOLUTION INTRAVENOUS CONTINUOUS
Status: DISCONTINUED | OUTPATIENT
Start: 2018-11-13 | End: 2018-11-13 | Stop reason: HOSPADM

## 2018-11-13 RX ORDER — LORAZEPAM 1 MG/1
1 TABLET ORAL ONCE
Status: COMPLETED | OUTPATIENT
Start: 2018-11-13 | End: 2018-11-13

## 2018-11-13 RX ORDER — METHYLPREDNISOLONE SODIUM SUCCINATE 125 MG/2ML
80 INJECTION, POWDER, LYOPHILIZED, FOR SOLUTION INTRAMUSCULAR; INTRAVENOUS ONCE
Status: COMPLETED | OUTPATIENT
Start: 2018-11-13 | End: 2018-11-13

## 2018-11-13 RX ORDER — HYDROXYZINE HYDROCHLORIDE 50 MG/ML
50 INJECTION, SOLUTION INTRAMUSCULAR ONCE
Status: DISCONTINUED | OUTPATIENT
Start: 2018-11-13 | End: 2018-11-13 | Stop reason: DRUGHIGH

## 2018-11-13 RX ORDER — VENLAFAXINE HYDROCHLORIDE 225 MG/1
225 TABLET, EXTENDED RELEASE ORAL DAILY
COMMUNITY
Start: 2018-10-23 | End: 2018-11-27

## 2018-11-13 RX ORDER — KETAMINE HYDROCHLORIDE 10 MG/ML
30 INJECTION, SOLUTION INTRAMUSCULAR; INTRAVENOUS ONCE
Status: DISCONTINUED | OUTPATIENT
Start: 2018-11-13 | End: 2018-11-13

## 2018-11-13 RX ORDER — KETOROLAC TROMETHAMINE 30 MG/ML
30 INJECTION, SOLUTION INTRAMUSCULAR; INTRAVENOUS ONCE
Status: COMPLETED | OUTPATIENT
Start: 2018-11-13 | End: 2018-11-13

## 2018-11-13 RX ADMIN — DIPHENHYDRAMINE HYDROCHLORIDE 50 MG: 50 INJECTION, SOLUTION INTRAMUSCULAR; INTRAVENOUS at 19:43

## 2018-11-13 RX ADMIN — LORAZEPAM 1 MG: 1 TABLET ORAL at 21:45

## 2018-11-13 RX ADMIN — LORAZEPAM 1 MG: 1 TABLET ORAL at 21:37

## 2018-11-13 RX ADMIN — KETOROLAC TROMETHAMINE 30 MG: 30 INJECTION, SOLUTION INTRAMUSCULAR at 19:43

## 2018-11-13 RX ADMIN — METHYLPREDNISOLONE SODIUM SUCCINATE 81.25 MG: 125 INJECTION, POWDER, FOR SOLUTION INTRAMUSCULAR; INTRAVENOUS at 20:14

## 2018-11-13 RX ADMIN — HYDROMORPHONE HYDROCHLORIDE 1.5 MG: 1 INJECTION, SOLUTION INTRAMUSCULAR; INTRAVENOUS; SUBCUTANEOUS at 19:51

## 2018-11-13 NOTE — ED AVS SNAPSHOT
[facility] Emergency Department    [address] [address]    Phone:  [phone]    Fax:  [phone]                                       Doretha Fernandez   MRN: [id number]    Department:  [facility] Emergency Department   Date of Visit:  11/13/2018           Patient Information     Date Of Birth          1975        Your diagnoses for this visit were:     Arthralgia of multiple joints        You were seen by Clifford Soliz MD.      Follow-up Information     Follow up with Rheumatology. Call in 1 day.        Follow up with Oncology .        Discharge Instructions         Arthralgia    Arthralgia is the term for pain in or around the joint. It is a symptom, not a disease. This pain may involve one or more joints. In some cases, the pain moves from joint to joint.  There are many causes for joint pain. These include:    Injury    Osteoarthritis (wearing out of the joint surface)    Gout (inflammation of the joint due to crystals in the joint fluid)    Infection inside the joint      Bursitis (inflammation of the fluid-filled sacs around the joint)    Autoimmune disorders such as rheumatoid arthritis or lupus    Tendonitis (inflammation of chords that attach muscle to bone)  Home care    Rest the involved joint(s) until your symptoms improve.     You may be prescribed pain medicine. If none is prescribed, you may use acetaminophen or ibuprofen to control pain and inflammation.  Follow-up care  Follow up with your healthcare provider or as advised.  When to seek medical advice  Contact your healthcare provider right away if any of the following occurs:    Pain, swelling, or redness of joint increases    Pain worsens or recurs after a period of improvement    Pain moves to other joints    You cannot bear weight on the affected joint     You cannot move the affected joint    Joint appears deformed    New rash appears    Fever of 100.4 F (38 C) or higher, or as directed by your  healthcare provider  Date Last Reviewed: 3/1/2017    1449-0057 [employer]. [address] [place] [address], [place] [address]. All rights reserved. This information is not intended as a substitute for professional medical care. Always follow your healthcare professional's instructions.          Discharge References/Attachments     CHRONIC PAIN, MANAGING (ENGLISH)    PAIN, COMPLEMENTARY CARE FOR (ENGLISH)    PAIN RESPONSE, UNDERSTANDING (ENGLISH)    PAIN, THE CYCLE OF CHRONIC (ENGLISH)    PAIN, UNDERSTANDING CHRONIC (ENGLISH)    FIBROMYALGIA (ENGLISH)      Your next 10 appointments already scheduled     Nov 14, 2018  7:40 AM CST   Office Visit with Anna Naidu MD   [facility] ([facility])    [address] [facility] [address] [phone]           Bring a current list of meds and any records pertaining to this visit. For Physicals, please bring immunization records and any forms needing to be filled out. Please arrive 10 minutes early to complete paperwork.            Nov 28, 2018  9:30 AM CST   Return Visit with Lowell Bullock MD   [facility] ([facility])    [address] [facility] [facility] [id number] [phone]              24 Hour Appointment Hotline       To make an appointment at any [facility], call [phone] ([phone]). If you don't have a family doctor or clinic, we will help you find one. [facility] are conveniently located to serve the needs of you and your family.             Review of your medicines      Our records show that you are taking the medicines listed below. If these are incorrect, please call your family doctor or clinic.        Dose / Directions Last dose taken    CALCIUM PO   Dose:  800 mg        Take 800 mg by mouth daily   Refills:  0        HYDROmorphone 4 MG tablet   Commonly known as:  DILAUDID   Dose:  4-6 mg   Quantity:  30 tablet        Take 1-1.5 tablets  "(4-6 mg) by mouth every 3 hours as needed for moderate to severe pain   Refills:  0        loperamide 2 MG capsule   Commonly known as:  IMODIUM   Dose:  2 mg        Take 2 mg by mouth 4 times daily as needed for diarrhea   Refills:  0        LORazepam 0.5 MG tablet   Commonly known as:  ATIVAN   Dose:  0.5-1 mg   Quantity:  10 tablet        Take 1-2 tablets (0.5-1 mg) by mouth every 6 hours as needed for anxiety or other (nausea)   Refills:  0        * order for DME   Quantity:  1 Units        Equipment being ordered: 20-30mmHg compression sleeve and 20-30mmHg compression glove\" x2 pair   Refills:  0        * order for DME   Quantity:  1 each        Equipment being ordered: x2 Wearease compression shirt   Refills:  0        polyethylene glycol Packet   Commonly known as:  MIRALAX/GLYCOLAX   Dose:  1 packet        Take 1 packet by mouth daily   Refills:  0        predniSONE 10 MG tablet   Commonly known as:  DELTASONE   Dose:  20 mg   Quantity:  100 tablet        Take 2 tablets (20 mg) by mouth daily   Refills:  1        promethazine 25 MG tablet   Commonly known as:  PHENERGAN   Dose:  25 mg   Quantity:  56 tablet        Take 1 tablet (25 mg) by mouth every 4 hours as needed for nausea or vomiting   Refills:  0        rOPINIRole 0.25 MG tablet   Commonly known as:  REQUIP   Dose:  0.25 mg   Quantity:  30 tablet        Take 1 tablet (0.25 mg) by mouth At Bedtime   Refills:  11        sulfamethoxazole-trimethoprim 400-80 MG per tablet   Commonly known as:  BACTRIM/SEPTRA   Dose:  1 tablet        Take 1 tablet by mouth daily While on high dose steroids (prednisone 20mg or greater)   Refills:  0        TYLENOL PO   Dose:  1000 mg        Take 1,000 mg by mouth every 8 hours as needed for mild pain or fever   Refills:  0        venlafaxine 225 MG Tb24 24 hr tablet   Commonly known as:  EFFEXOR-ER   Dose:  225 mg        Take 225 mg by mouth daily   Refills:  0        Vitamin D3 3000 units Tabs   Dose:  3000 Int'l Units "   Quantity:  90 tablet        Take 3,000 Int'l Units by mouth daily   Refills:  2        ZOFRAN 8 MG tablet   Generic drug:  ondansetron        Take by mouth every 8 hours as needed for nausea   Refills:  0        * Notice:  This list has 2 medication(s) that are the same as other medications prescribed for you. Read the directions carefully, and ask your doctor or other care provider to review them with you.            Procedures and tests performed during your visit     CBC with platelets, differential    Comprehensive metabolic panel    Erythrocyte sedimentation rate auto      Orders Needing Specimen Collection     None      Pending Results     No orders found from 11/11/2018 to 11/14/2018.            Pending Culture Results     No orders found from 11/11/2018 to 11/14/2018.            Pending Results Instructions     If you had any lab results that were not finalized at the time of your Discharge, you can call the ED Lab Result RN at [phone]. You will be contacted by this team for any positive Lab results or changes in treatment. The nurses are available 7 days a week from 10A to 6:30P.  You can leave a message 24 hours per day and they will return your call.        Test Results From Your Hospital Stay        11/13/2018  7:51 PM      Component Results     Component Value Ref Range & Units Status    Sodium 139 133 - 144 mmol/L Final    Potassium 3.9 3.4 - 5.3 mmol/L Final    Chloride 106 94 - 109 mmol/L Final    Carbon Dioxide 24 20 - 32 mmol/L Final    Anion Gap 9 3 - 14 mmol/L Final    Glucose 149 (H) 70 - 99 mg/dL Final    Urea Nitrogen 10 7 - 30 mg/dL Final    Creatinine 0.64 0.52 - 1.04 mg/dL Final    GFR Estimate >90 >60 mL/min/1.7m2 Final    Non  GFR Calc    GFR Estimate If Black >90 >60 mL/min/1.7m2 Final    African American GFR Calc    Calcium 9.0 8.5 - 10.1 mg/dL Final    Bilirubin Total 0.2 0.2 - 1.3 mg/dL Final    Albumin 3.8 3.4 - 5.0 g/dL Final    Protein Total 7.5 6.8 - 8.8  g/dL Final    Alkaline Phosphatase 61 40 - 150 U/L Final    ALT 29 0 - 50 U/L Final    AST 18 0 - 45 U/L Final         11/13/2018  7:35 PM      Component Results     Component Value Ref Range & Units Status    WBC 15.3 (H) 4.0 - 11.0 10e9/L Final    RBC Count 4.48 3.8 - 5.2 10e12/L Final    Hemoglobin 12.2 11.7 - 15.7 g/dL Final    Hematocrit 38.2 35.0 - 47.0 % Final    MCV 85 78 - 100 fl Final    MCH 27.2 26.5 - 33.0 pg Final    MCHC 31.9 31.5 - 36.5 g/dL Final    RDW 14.4 10.0 - 15.0 % Final    Platelet Count 375 150 - 450 10e9/L Final    Diff Method Automated Method  Final    % Neutrophils 84.1 % Final    % Lymphocytes 10.4 % Final    % Monocytes 3.4 % Final    % Eosinophils 0.7 % Final    % Basophils 0.5 % Final    % Immature Granulocytes 0.9 % Final    Nucleated RBCs 0 0 /100 Final    Absolute Neutrophil 12.8 (H) 1.6 - 8.3 10e9/L Final    Absolute Lymphocytes 1.6 0.8 - 5.3 10e9/L Final    Absolute Monocytes 0.5 0.0 - 1.3 10e9/L Final    Absolute Eosinophils 0.1 0.0 - 0.7 10e9/L Final    Absolute Basophils 0.1 0.0 - 0.2 10e9/L Final    Abs Immature Granulocytes 0.1 0 - 0.4 10e9/L Final    Absolute Nucleated RBC 0.0  Final         11/13/2018  8:31 PM      Component Results     Component Value Ref Range & Units Status    Sed Rate 14 0 - 20 mm/h Final                Thank you for choosing [facility]       Thank you for choosing [facility] for your care. Our goal is always to provide you with excellent care. Hearing back from our patients is one way we can continue to improve our services. Please take a few minutes to complete the written survey that you may receive in the mail after you visit with us. Thank you!        [employer] Information     [employer] gives you secure access to your electronic health record. If you see a primary care provider, you can also send messages to your care team and make appointments. If you have questions, please call your primary care clinic.  If you do not have a primary care provider,  please call [phone] and they will assist you.        Care EveryWhere ID     This is your Care EveryWhere ID. This could be used by other organizations to access your [facility] medical records  [id number]        Equal Access to Services     BRIGETTE QUARLES : Nicol Gustafson, wabryan valencia, qaybta kaalmada chris, pancho castillo. So [facility] [phone].    ATENCIÓN: Si habla español, tiene a mcdonnell disposición servicios gratuitos de asistencia lingüística. Llame al [phone].    We comply with applicable federal civil rights laws and [place] laws. We do not discriminate on the basis of race, color, national origin, age, disability, sex, sexual orientation, or gender identity.            After Visit Summary       This is your record. Keep this with you and show to your community pharmacist(s) and doctor(s) at your next visit.

## 2018-11-13 NOTE — ED AVS SNAPSHOT
[facility] Emergency Department    [address] [address]    Phone:  [phone]    Fax:  [phone]                                       Doretha Fernandez   MRN: [id number]    Department:  [facility] Emergency Department   Date of Visit:  11/13/2018           After Visit Summary Signature Page     I have received my discharge instructions, and my questions have been answered. I have discussed any challenges I see with this plan with the nurse or doctor.    ..........................................................................................................................................  Patient/Patient Representative Signature      ..........................................................................................................................................  Patient Representative Print Name and Relationship to Patient    ..................................................               ................................................  Date                                   Time    ..........................................................................................................................................  Reviewed by Signature/Title    ...................................................              ..............................................  Date                                               Time          22EPIC Rev 08/18

## 2018-11-14 ENCOUNTER — OFFICE VISIT (OUTPATIENT)
Dept: FAMILY MEDICINE | Facility: CLINIC | Age: 43
End: 2018-11-14
Payer: COMMERCIAL

## 2018-11-14 VITALS
SYSTOLIC BLOOD PRESSURE: 104 MMHG | TEMPERATURE: 98.6 F | OXYGEN SATURATION: 96 % | DIASTOLIC BLOOD PRESSURE: 64 MMHG | RESPIRATION RATE: 20 BRPM | BODY MASS INDEX: 40.57 KG/M2 | WEIGHT: 229 LBS | HEART RATE: 129 BPM

## 2018-11-14 DIAGNOSIS — M19.90 INFLAMMATORY ARTHRITIS: Primary | ICD-10-CM

## 2018-11-14 DIAGNOSIS — G89.29 OTHER CHRONIC PAIN: ICD-10-CM

## 2018-11-14 PROCEDURE — 99214 OFFICE O/P EST MOD 30 MIN: CPT | Performed by: FAMILY MEDICINE

## 2018-11-14 RX ORDER — PREGABALIN 75 MG/1
75 CAPSULE ORAL 2 TIMES DAILY
Qty: 60 CAPSULE | Refills: 1 | Status: SHIPPED | OUTPATIENT
Start: 2018-11-14 | End: 2019-01-21

## 2018-11-14 ASSESSMENT — ANXIETY QUESTIONNAIRES
5. BEING SO RESTLESS THAT IT IS HARD TO SIT STILL: SEVERAL DAYS
6. BECOMING EASILY ANNOYED OR IRRITABLE: MORE THAN HALF THE DAYS
7. FEELING AFRAID AS IF SOMETHING AWFUL MIGHT HAPPEN: MORE THAN HALF THE DAYS
2. NOT BEING ABLE TO STOP OR CONTROL WORRYING: MORE THAN HALF THE DAYS
1. FEELING NERVOUS, ANXIOUS, OR ON EDGE: MORE THAN HALF THE DAYS
3. WORRYING TOO MUCH ABOUT DIFFERENT THINGS: MORE THAN HALF THE DAYS
GAD7 TOTAL SCORE: 13

## 2018-11-14 ASSESSMENT — PATIENT HEALTH QUESTIONNAIRE - PHQ9
SUM OF ALL RESPONSES TO PHQ QUESTIONS 1-9: 11
5. POOR APPETITE OR OVEREATING: MORE THAN HALF THE DAYS

## 2018-11-14 NOTE — MR AVS SNAPSHOT
After Visit Summary   11/14/2018    Doretha Fernandez    MRN: [id number]           Patient Information     Date Of Birth          1975        Visit Information        Provider Department      11/14/2018 7:40 AM Anna Naidu MD [facility]        Today's Diagnoses     Inflammatory arthritis    -  1    Other chronic pain           Follow-ups after your visit        Follow-up notes from your care team     Return in about 4 weeks (around 12/12/2018) for recheck on the lyrica.      Your next 10 appointments already scheduled     Nov 28, 2018  9:30 AM CST   Return Visit with Lowell Bullock MD   [facility] ([facility])    [address] [address] [phone]              Who to contact     If you have questions or need follow up information about today's clinic visit or your schedule please contact [facility] directly at [phone].  Normal or non-critical lab and imaging results will be communicated to you by MyChart, letter or phone within 4 business days after the clinic has received the results. If you do not hear from us within 7 days, please contact the clinic through [employer] or phone. If you have a critical or abnormal lab result, we will notify you by phone as soon as possible.  Submit refill requests through [employer] or call your pharmacy and they will forward the refill request to us. Please allow 3 business days for your refill to be completed.          Additional Information About Your Visit        MyChart Information     [employer] gives you secure access to your electronic health record. If you see a primary care provider, you can also send messages to your care team and make appointments. If you have questions, please call your primary care clinic.  If you do not have a primary care provider, please call [phone] and they will assist you.        Care EveryWhere ID     This is your Care  EveryWhere ID. This could be used by other organizations to access your [facility] medical records  [id number]        Your Vitals Were     Pulse Temperature Respirations Last Period Pulse Oximetry BMI (Body Mass Index)    129 98.6  F (37  C) (Tympanic) 20 10/16/2018 (Approximate) 96% 40.57 kg/m2       Blood Pressure from Last 3 Encounters:   11/14/18 104/64   11/13/18 108/68   09/26/18 123/85    Weight from Last 3 Encounters:   11/14/18 229 lb (103.9 kg)   06/13/18 208 lb (94.3 kg)   05/09/18 206 lb (93.4 kg)              Today, you had the following     No orders found for display         Today's Medication Changes          These changes are accurate as of 11/14/18  8:16 AM.  If you have any questions, ask your nurse or doctor.               Start taking these medicines.        Dose/Directions    pregabalin 75 MG capsule   Commonly known as:  LYRICA   Used for:  Inflammatory arthritis   Started by:  Anna Naidu MD        Dose:  75 mg   Take 1 capsule (75 mg) by mouth 2 times daily   Quantity:  60 capsule   Refills:  1            Where to get your medicines      Some of these will need a paper prescription and others can be bought over the counter.  Ask your nurse if you have questions.     Bring a paper prescription for each of these medications     pregabalin 75 MG capsule                Primary Care Provider Office Phone # Fax #    Jani Cole [phone]       [address] [place], [place] [id number]        Equal Access to Services     [facility] : Nicol Gustafson, waaxda lushalomadaha, qaybta kaalmada chris, pancho castillo. So [facility] [phone].    ATENCIÓN: Si habla español, tiene a mcdonnell disposición servicios gratuitos de asistencia lingüística. Llame al [phone].    We comply with applicable federal civil rights laws and [place] laws. We do not discriminate on the basis of race, color, national origin, age, disability, sex, sexual orientation,  "or gender identity.            Thank you!     Thank you for choosing [facility]  for your care. Our goal is always to provide you with excellent care. Hearing back from our patients is one way we can continue to improve our services. Please take a few minutes to complete the written survey that you may receive in the mail after your visit with us. Thank you!             Your Updated Medication List - Protect others around you: Learn how to safely use, store and throw away your medicines at www.disposemymeds.org.          This list is accurate as of 11/14/18  8:16 AM.  Always use your most recent med list.                   Brand Name Dispense Instructions for use Diagnosis    CALCIUM PO      Take 800 mg by mouth daily        HYDROmorphone 4 MG tablet    DILAUDID    30 tablet    Take 1-1.5 tablets (4-6 mg) by mouth every 3 hours as needed for moderate to severe pain    Proctocolitis       LORazepam 0.5 MG tablet    ATIVAN    10 tablet    Take 1-2 tablets (0.5-1 mg) by mouth every 6 hours as needed for anxiety or other (nausea)    Proctocolitis, Anxiety state, Metastatic malignant melanoma (H)       * order for DME     1 Units    Equipment being ordered: 20-30mmHg compression sleeve and 20-30mmHg compression glove\" x2 pair    Lymphedema of left arm       * order for DME     1 each    Equipment being ordered: x2 [employer] compression shirt    Secondary lymphedema       predniSONE 10 MG tablet    DELTASONE    100 tablet    Take 2 tablets (20 mg) by mouth daily    Malignant melanoma of left upper extremity including shoulder (H), Colitis       pregabalin 75 MG capsule    LYRICA    60 capsule    Take 1 capsule (75 mg) by mouth 2 times daily    Inflammatory arthritis       promethazine 25 MG tablet    PHENERGAN    56 tablet    Take 1 tablet (25 mg) by mouth every 4 hours as needed for nausea or vomiting    Proctocolitis       rOPINIRole 0.25 MG tablet    REQUIP    30 tablet    Take 1 tablet (0.25 mg) by " mouth At Bedtime    Restless leg syndrome       TYLENOL PO      Take 1,000 mg by mouth every 8 hours as needed for mild pain or fever        venlafaxine 225 MG Tb24 24 hr tablet    EFFEXOR-ER     Take 225 mg by mouth daily        Vitamin D3 3000 units Tabs     90 tablet    Take 3,000 Int'l Units by mouth daily    Vitamin D deficiency       ZOFRAN 8 MG tablet   Generic drug:  ondansetron      Take by mouth every 8 hours as needed for nausea        * Notice:  This list has 2 medication(s) that are the same as other medications prescribed for you. Read the directions carefully, and ask your doctor or other care provider to review them with you.

## 2018-11-14 NOTE — ED PROVIDER NOTES
"  History     Chief Complaint   Patient presents with     Generalized Body Aches     reports tapering off prednisone post caner treatment      HPI  Doretha Fernandez is a 43 year old female with history of metastatic melanoma with complication of colitis and chronic arthralgias from immuno therapy with Novolumab/Opdivo in February 2018 who reports increased arthralgias and generalized body pain since recent initiation of chronic steroid therapy was initiated and completed 4 days ago.  Last dose of prednisone 5 mg was 4 days ago.  She has had gradually worsening body aches and arthralgias since steroid taper was initiated in July and acutely exacerbated in the past 4 days since discontinuation of steroid therapy.  She is very anxious and distressed and reports that \"I hurt all over!\", \"my whole body hurts!\", \"I cannot live like this\", \"I just want to be normal again\".  She reports that all of her joints hurt, greatest in the knees and feet, and that her fingers and even her eyeballs hurt. Sx are refractory to Dilaudid 4 mg po, Ativan 1 mg po and a dose of Prednisone 20 mg recommended by her oncologist earlier today.  She reports she will be following up with her primary care provider, Dr. Anna Naidu, in the near future to evaluate for fibromyalgia.    Problem List:    Patient Active Problem List    Diagnosis Date Noted     Influenza-like illness 05/06/2018     Priority: Medium     Other chronic pain 05/06/2018     Priority: Medium     Rash and nonspecific skin eruption 04/05/2018     Priority: Medium     Bilateral leg cramps 03/07/2018     Priority: Medium     Intestinal giardiasis 03/05/2018     Priority: Medium     Rectal bleeding 03/04/2018     Priority: Medium     Diarrhea 03/04/2018     Priority: Medium     Colitis 03/01/2018     Priority: Medium     Functional diarrhea 02/22/2018     Priority: Medium     Melanoma (H) 10/23/2017     Priority: Medium     Malignant melanoma of left upper extremity including " shoulder (H) 10/12/2017     Priority: Medium     Metastatic malignant melanoma (H) 10/10/2017     Priority: Medium     Prothrombin mutation (H) 2017     Priority: Medium     On daily aspirin 81 mg per hematology's recommendations from        Vision changes 2017     Priority: Medium     Mild intermittent asthma without complication 2013     Priority: Medium     Mild major depression (H) 2013     Priority: Medium     Anxiety state 2012     Priority: Medium     Problem list name updated by automated process. Provider to review       Esophageal reflux 2012     Priority: Medium     DUB (dysfunctional uterine bleeding) 2011     Priority: Medium     CARDIOVASCULAR SCREENING; LDL GOAL LESS THAN 160 2011     Priority: Low        Past Medical History:    Past Medical History:   Diagnosis Date     Abnormal MRI      Anxiety      Basal cell carcinoma      Depression      Lumbago      Malignant melanoma (H)      Mild persistent asthma      Prothrombin deficiency (H)      Stroke (cerebrum) (H)      TIA (transient ischemic attack)        Past Surgical History:    Past Surgical History:   Procedure Laterality Date     COLONOSCOPY N/A 10/18/2017    Procedure: COLONOSCOPY;  Colon;  Surgeon: Debbie Stephens MD;  [facility]     COLONOSCOPY N/A 3/9/2018    Procedure: COMBINED COLONOSCOPY, SINGLE OR MULTIPLE BIOPSY/POLYPECTOMY BY BIOPSY;  colon  ;  Surgeon: Benita Schumacher MD;  Location: [facility] GI     DISSECT LYMPH NODE AXILLA Left 10/23/2017    Procedure: DISSECT LYMPH NODE AXILLA;  Left Axillary Lymph Node Dissection ;  Surgeon: Laurent Cool MD;  Location: [facility] OR     GYN SURGERY           REPAIR MOHS Left 2017    Procedure: REPAIR MOHS;  Left Upper Lid Moh's Reconstruction;  Surgeon: Kisha Bosch MD;  Location: [facility]       Family History:    Family History   Problem Relation Age of Onset     Cancer Mother 45     lung     Neurologic Disorder Mother       "Lipids Father      GASTROINTESTINAL DISEASE Father      Depression Father      Cancer Maternal Grandmother      Blood Disease Maternal Grandmother      Arthritis Maternal Grandmother      Diabetes Maternal Grandmother      Depression Maternal Grandmother      Macular Degeneration Maternal Grandmother      Glaucoma Maternal Grandmother      Diabetes Maternal Grandfather      Cerebrovascular Disease Maternal Grandfather      Blood Disease Maternal Grandfather      HEART DISEASE Maternal Grandfather      Glaucoma Maternal Grandfather      Cancer Paternal Grandmother      Cancer - colorectal Paternal Grandmother      Respiratory Paternal Grandfather      Blood Disease Paternal Grandfather      HEART DISEASE Daughter      Asthma Daughter      Depression Sister        Social History:  Marital Status:   [4]  Social History   Substance Use Topics     Smoking status: Passive Smoke Exposure - Never Smoker     Last attempt to quit: 3/20/1998     Smokeless tobacco: Never Used     Alcohol use No      Comment: occ        Medications:      Acetaminophen (TYLENOL PO)   CALCIUM PO   Cholecalciferol (VITAMIN D3) 3000 units TABS   HYDROmorphone (DILAUDID) 4 MG tablet   LORazepam (ATIVAN) 0.5 MG tablet   predniSONE (DELTASONE) 10 MG tablet   rOPINIRole (REQUIP) 0.25 MG tablet   venlafaxine (EFFEXOR-ER) 225 MG TB24 24 hr tablet   ondansetron (ZOFRAN) 8 MG tablet   order for DME   order for DME   pregabalin (LYRICA) 75 MG capsule   promethazine (PHENERGAN) 25 MG tablet        Allergies   Allergen Reactions     Compazine [Prochlorperazine] Fatigue     Fentanyl Other (See Comments)     sweating     Erythrocin Nausea and Vomiting     Zithromax [Azithromycin Dihydrate] Diarrhea       Review of Systems  As mentioned above in the history present illness.  All other systems were reviewed and are negative.    Physical Exam   BP: 127/82  Heart Rate: 115  Temp: 97.9  F (36.6  C)  Resp: 22  Height: 160 cm (5' 3\")  SpO2: 98 %      Physical " Exam   Constitutional: She is oriented to person, place, and time. She appears well-developed and well-nourished. She appears distressed.   HENT:   Head: Normocephalic and atraumatic.   Mouth/Throat: Oropharynx is clear and moist.   Eyes: Conjunctivae and EOM are normal. No scleral icterus.   Neck: Normal range of motion. Neck supple. No tracheal deviation present. No thyromegaly present.   Cardiovascular: Normal rate, regular rhythm, normal heart sounds and intact distal pulses.  Exam reveals no gallop and no friction rub.    No murmur heard.  Pulmonary/Chest: Effort normal and breath sounds normal. No respiratory distress. She has no wheezes. She has no rales. She exhibits no tenderness.   Abdominal: Soft. Bowel sounds are normal. She exhibits no distension and no mass. There is no tenderness. There is no rebound and no guarding.   Musculoskeletal: Normal range of motion. She exhibits no edema.   Diffuse arthralgias without joint effusion, erythema or warmth.   Neurological: She is alert and oriented to person, place, and time.   Skin: Skin is warm and dry. No rash noted. She is not diaphoretic. No erythema. No pallor.   Psychiatric: Her behavior is normal.   Very anxious, tearful, difficulty speaking due to anxiety, hyperventilation and crying.   Nursing note and vitals reviewed.      ED Course     ED Course     Procedures                 No results found for this or any previous visit (from the past 24 hour(s)).    Medications   HYDROmorphone (DILAUDID) injection 1.5 mg (1.5 mg Intravenous Given 11/13/18 1951)   ketorolac (TORADOL) injection 30 mg (30 mg Intravenous Given 11/13/18 1943)   diphenhydrAMINE (BENADRYL) injection 50 mg (50 mg Intravenous Given 11/13/18 1943)   methylPREDNISolone sodium succinate (solu-MEDROL) injection 81.25 mg (81.25 mg Intravenous Given 11/13/18 2014)   LORazepam (ATIVAN) tablet 1 mg (1 mg Oral Given 11/13/18 2137)   LORazepam (ATIVAN) tablet 1 mg (1 mg Oral Given 11/13/18 2145)      7:38 PM - Reviewed case and consulted with her Oncologist, Dr. Giron. We agreed that her primary problem is currently due to overwhelming anxiety.  He recommended a benzodiazepine, Solu-Medrol and a little bit of opiate analgesic.     9:16 PM - Resting comfortably, feeling much improved, comfortable discharge home.    Assessments & Plan (with Medical Decision Making)   Acute on chronic pain syndrome of unclear etio. Metastatic melanoma treated with immunotherapy (Novolumab/Opdivo), last in Feb. 2018 with chronic arthralgias since. Also had colitis, but this has been well controlled by high dose steroid therapy. Long steroid taper per Rheumatology, started in July and completed taper with last 5 mg dose 4 days ago. Past month she has had gradually increasing pain as steroid dose decreased, definitely a bit exacerbated in the past 4 days since Prednisone was discontinued.  Pain refractory to Dilaudid and Ativan.  Her oncologist had her take a dose of prednisone 20 mg earlier today, without relief.  I consulted her oncologist and she was given Solu-Medrol 81.25 mg.  Pain and anxiety were controlled with Dilaudid 1.5 mg IV, Toradol, Benadryl and an additional dose of Benadryl.  She is comfortable discharge home and will follow up with her oncologist and rheumatologist tomorrow.  In addition she reports that she is going to following up with her primary care provider in the near future for evaluation of possible fibromyalgia, and can discuss long-term management of chronic pain. She was provided instructions for supportive care and will return as needed for worsened condition or worsening symptoms, or new problems or concerns.      I have reviewed the nursing notes.    I have reviewed the findings, diagnosis, plan and need for follow up with the patient.      Discharge Medication List as of 11/13/2018  9:19 PM          Final diagnoses:   Arthralgia of multiple joints   Anxiety   Chronic pain syndrome        11/13/2018   [facility] EMERGENCY DEPARTMENT     Clifford Soliz MD  11/16/18 0309

## 2018-11-14 NOTE — PROGRESS NOTES
"  SUBJECTIVE:   Doretha Fernandez is a 43 year old female who presents to clinic today for the following health issues:      ED/UC Followup:    Facility:  [facility] Emergency Department  Date of visit: 11/13/2018  Reason for visit: Joint pain  Current Status: Patient states she is doing better today but she still has the joint pain.      42 yo female with malignant melanoma (no primary known) who developed arthritis after Opt[employer] (also developed drug induced colitis - severe).   Had been on prednisone since Feb.  Tapered as of July - Friday 11/9/2018 was on 5 mg daily - that was the last day of the taper.     20 mg last night - at the recommendation of Oncologist who then recommended she also be seen in the ER.   ER called [facility] for recommendations - given IV steroids in the ER last night.   Feeling much better than 24 hours ago.    Rheumatoid doctor asked her to come here to discuss pain control options (Lyrica or Cymbalta).  Feels moods are \"better\" on the Effexor.    Gabapentin was causing significant fatigue - \"falling asleep sitting up\".  She's unsure the dose she was on but thinks it was 1800 mg total.  (likely 600 mg three times daily).  Stopped the gabapentin in mid October.  At the time was only having knee and feet pain - as she weaned off the prednisone was having more pain.     She has f/u with rheumatology next week at [facility]. She is also have a nuclear bone scan that week.         /64  Pulse 129  Temp 98.6  F (37  C) (Tympanic)  Resp 20  Wt 229 lb (103.9 kg)  LMP 10/16/2018 (Approximate)  SpO2 96%  BMI 40.57 kg/m2  EXAM: GENERAL APPEARANCE ADULT: Alert, no acute distress, moon facies  PSYCH: mentation appears normal., tearful      ASSESSMENT/PLAN:      ICD-10-CM    1. Inflammatory arthritis M19.90 pregabalin (LYRICA) 75 MG capsule   2. Other chronic pain G89.29      Start Lyrica - will titrate up slowly given her history of sensitivity and side effects with medication.  Discussed that after " a week we could increase from 150 mg/day to 300 mg/day, but I will plan to see her back in 1 month for a recheck.    Continue follow up with [facility]ologist as planned.      Anna Naidu M.D.

## 2018-11-14 NOTE — DISCHARGE INSTRUCTIONS
Arthralgia    Arthralgia is the term for pain in or around the joint. It is a symptom, not a disease. This pain may involve one or more joints. In some cases, the pain moves from joint to joint.  There are many causes for joint pain. These include:    Injury    Osteoarthritis (wearing out of the joint surface)    Gout (inflammation of the joint due to crystals in the joint fluid)    Infection inside the joint      Bursitis (inflammation of the fluid-filled sacs around the joint)    Autoimmune disorders such as rheumatoid arthritis or lupus    Tendonitis (inflammation of chords that attach muscle to bone)  Home care    Rest the involved joint(s) until your symptoms improve.     You may be prescribed pain medicine. If none is prescribed, you may use acetaminophen or ibuprofen to control pain and inflammation.  Follow-up care  Follow up with your healthcare provider or as advised.  When to seek medical advice  Contact your healthcare provider right away if any of the following occurs:    Pain, swelling, or redness of joint increases    Pain worsens or recurs after a period of improvement    Pain moves to other joints    You cannot bear weight on the affected joint     You cannot move the affected joint    Joint appears deformed    New rash appears    Fever of 100.4 F (38 C) or higher, or as directed by your healthcare provider  Date Last Reviewed: 3/1/2017    [id number] [employer]. [address] [place] [address], [place] [address]. All rights reserved. This information is not intended as a substitute for professional medical care. Always follow your healthcare professional's instructions.

## 2018-11-15 ASSESSMENT — ANXIETY QUESTIONNAIRES: GAD7 TOTAL SCORE: 13

## 2018-11-27 DIAGNOSIS — F41.1 ANXIETY STATE: ICD-10-CM

## 2018-11-27 DIAGNOSIS — F32.0 MILD MAJOR DEPRESSION (H): Primary | ICD-10-CM

## 2018-11-27 NOTE — TELEPHONE ENCOUNTER
"Requested Prescriptions   Pending Prescriptions Disp Refills     venlafaxine (EFFEXOR-ER) 225 MG 24 hr tablet 30 each      Sig: Take 1 tablet (225 mg) by mouth daily    Serotonin-Norepinephrine Reuptake Inhibitors  Passed    11/27/2018  3:02 PM       Passed - Blood pressure under 140/90 in past 12 months    BP Readings from Last 3 Encounters:   11/14/18 104/64   11/13/18 108/68   09/26/18 123/85                Passed - Recent (12 mo) or future (30 days) visit within the authorizing provider's specialty    Patient had office visit in the last 12 months or has a visit in the next 30 days with authorizing provider or within the authorizing provider's specialty.  See \"Patient Info\" tab in inbasket, or \"Choose Columns\" in Meds & Orders section of the refill encounter.             Passed - Patient is age 18 or older       Passed - No active pregnancy on record       Passed - Normal serum creatinine on file in past 12 months    Recent Labs   Lab Test  11/13/18   1926   CR  0.64            Passed - No positive pregnancy test in past 12 months        Last Written Prescription Date: 10/23/2018  Last Fill Quantity: 0,  # refills: 0   Last office visit: 11/14/2018 with prescribing provider:  Evangelista      Pt states she is out of this medication for the past two days and wondering if she can get at least a few to get by for now?  [facility] get [facility]    Future Office Visit:   Next 5 appointments (look out 90 days)     Nov 28, 2018  9:30 AM CST   [facility] Visit with Lowell Bullock MD   [facility]    [address] [place] [id number] [phone]                   "

## 2018-11-28 ENCOUNTER — OFFICE VISIT (OUTPATIENT)
Dept: DERMATOLOGY | Facility: CLINIC | Age: 43
End: 2018-11-28
Payer: COMMERCIAL

## 2018-11-28 VITALS — OXYGEN SATURATION: 96 % | HEART RATE: 83 BPM | SYSTOLIC BLOOD PRESSURE: 136 MMHG | DIASTOLIC BLOOD PRESSURE: 81 MMHG

## 2018-11-28 DIAGNOSIS — C43.9 METASTATIC MELANOMA (H): Primary | ICD-10-CM

## 2018-11-28 DIAGNOSIS — D23.9 DERMAL NEVUS: ICD-10-CM

## 2018-11-28 DIAGNOSIS — Z85.828 HISTORY OF BASAL CELL CANCER: ICD-10-CM

## 2018-11-28 DIAGNOSIS — L82.1 SK (SEBORRHEIC KERATOSIS): ICD-10-CM

## 2018-11-28 DIAGNOSIS — D18.00 ANGIOMA: ICD-10-CM

## 2018-11-28 DIAGNOSIS — L81.4 LENTIGO: ICD-10-CM

## 2018-11-28 PROCEDURE — 99213 OFFICE O/P EST LOW 20 MIN: CPT | Performed by: DERMATOLOGY

## 2018-11-28 RX ORDER — VENLAFAXINE HYDROCHLORIDE 225 MG/1
225 TABLET, EXTENDED RELEASE ORAL DAILY
Qty: 90 EACH | Refills: 1 | Status: SHIPPED | OUTPATIENT
Start: 2018-11-28 | End: 2019-09-24

## 2018-11-28 RX ORDER — PREGABALIN 75 MG/1
75 CAPSULE ORAL
COMMUNITY
End: 2019-01-28 | Stop reason: DRUGHIGH

## 2018-11-28 RX ORDER — ALENDRONATE SODIUM 70 MG/1
70 TABLET ORAL
COMMUNITY
Start: 2018-11-23 | End: 2019-02-18

## 2018-11-28 RX ORDER — SULFAMETHOXAZOLE/TRIMETHOPRIM 800-160 MG
TABLET ORAL
COMMUNITY
Start: 2018-11-23 | End: 2019-07-16

## 2018-11-28 RX ORDER — PREDNISONE 20 MG/1
TABLET ORAL
COMMUNITY
Start: 2018-11-23 | End: 2019-11-15

## 2018-11-28 NOTE — LETTER
2018         RE: Doretha Fernandez  [id number]  [place] [address]        Dear Colleague,    Thank you for referring your patient, Doretha Fernandez, to the [facility]. Please see a copy of my visit note below.    Doretha Fernandez is a 43 year old year old female patient here today for f/u h xof melanoma unknown primary.  She went to Dr. Cool for node dissection.1 out of 20 nodes positive.  No adjuvant Radiation given no survvial advantage.   MRI of brain and PET scan both clear.  Was on  Nivolumab got 8 injection but got colitis.  She went to [place].   She was treated with high dose steroids for colitis and had a dx of bacterial pneumonia.   She was admitted for to hospital for this. She is followed by GI and is on pred taper.  She was started on Entyvio.  Scans have been clear.  She is back on Nivo with prednisone.    She denies any new or changing skin lesions.  Patient reports the following modifying factors none.  Associated symptoms: none.  Patient has no other skin complaints today.  Remainder of the HPI, Meds, PMH, Allergies, FH, and SH was reviewed in chart.  Current PET-CT scan, physical exam and laboratory tests demonstrate no evidence of melanoma recurrence.       Pertinent Hx:                Melanoma metastatic      Past Medical History               Past Medical History:   Diagnosis Date     Abnormal MRI         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      Anxiety        Depression        Lumbago         left lower back pain     Malignant melanoma (H)        Mild persistent asthma        Prothrombin deficiency (H)         takes 81mg asa daily     Stroke (cerebrum) (H)       During      TIA (transient ischemic attack)                    Past Surgical History                  Past Surgical History:   Procedure Laterality Date     COLONOSCOPY N/A 10/18/2017      Procedure: COLONOSCOPY;  Colon;  Surgeon: Debbie Stephens MD;  Location:  OR      DISSECT LYMPH NODE AXILLA Left 10/23/2017      Procedure: DISSECT LYMPH NODE AXILLA;  Left Axillary Lymph Node Dissection ;  Surgeon: Laurent Cool MD;  Location: [facility] OR     GYN SURGERY                 REPAIR MOHS Left 2017      Procedure: REPAIR MOHS;  Left Upper Lid Moh's Reconstruction;  Surgeon: Kisha Bosch MD;  [facility]                   Family History                     Family History   Problem Relation Age of Onset     CANCER Mother 45         lung     Neurologic Disorder Mother        Lipids Father        GASTROINTESTINAL DISEASE Father        Depression Father        CANCER Maternal Grandmother        Blood Disease Maternal Grandmother        Arthritis Maternal Grandmother        DIABETES Maternal Grandmother        Depression Maternal Grandmother        Macular Degeneration Maternal Grandmother        Glaucoma Maternal Grandmother        DIABETES Maternal Grandfather        CEREBROVASCULAR DISEASE Maternal Grandfather        Blood Disease Maternal Grandfather        HEART DISEASE Maternal Grandfather        Glaucoma Maternal Grandfather        CANCER Paternal Grandmother        Cancer - colorectal Paternal Grandmother        Respiratory Paternal Grandfather        Blood Disease Paternal Grandfather        HEART DISEASE Daughter        Asthma Daughter        Depression Sister                      Social History    Social History                     Social History     Marital status:          Spouse name: N/A     Number of children: N/A     Years of education: N/A               Occupational History     Not on file.                        Social History Main Topics     Smoking status: Passive Smoke Exposure - Never Smoker         Last attempt to quit: 3/20/1998     Smokeless tobacco: Never Used     Alcohol use No            Comment: occ     Drug use: No     Sexual activity: Yes         Partners: Male         Birth control/ protection: Surgical                  Other  "Topics Concern     Parent/Sibling W/ Cabg, Mi Or Angioplasty Before 65f 55m? No               Social History Narrative      20 y.o- patient's mother   of lung cancer. She had to take care of her younger sister.      2012- patient's  had a heart attack with stents placed, followed by cardiac rehabilitation      2000 TO 2012  was in [facility] for depression      2013 patient's  went through alcohol rehabilitation at [place] inpatient                      They have attended couple counseling a couple of times and patient went to the family program for chemical dependency.      Patient denies alcohol or drug use and herself                      Has 2 children, girls ages 5 and 8      [employer] and also works for the [employer]. 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                                 Encounter Medications                     Outpatient Encounter Prescriptions as of 2018   Medication Sig Dispense Refill     venlafaxine (EFFEXOR-ER) 150 MG TB24 24 hr tablet Take 1 tablet (150 mg) by mouth daily (with breakfast) 30 each 0     order for DME Equipment being ordered: 20-30mmHg compression sleeve and 20-30mmHg compression glove\" x2 pair 1 Units 0     rOPINIRole (REQUIP) 0.25 MG tablet Take 1 tablet (0.25 mg) by mouth At Bedtime 30 tablet 11     cephALEXin (KEFLEX) 500 MG capsule Take 2 capsules (1,000 mg) by mouth 2 times daily 24 capsule 0     oxyCODONE IR (ROXICODONE) 5 MG tablet Take 1-3 tablets (5-15 mg) by mouth every 4 hours as needed for pain maximum 12 tablet(s) per day 40 tablet 0     aspirin 81 MG EC tablet Take 81 mg by mouth daily           acetaminophen (TYLENOL) 325 MG tablet Take 2 tablets (650 mg) by mouth every 4 hours as needed for other (mild pain) 100 tablet 0     senna-docusate (SENOKOT-S;PERICOLACE) 8.6-50 MG per tablet Take 1 tablet by mouth 2 times daily as needed for " constipation 60 tablet 1     LORazepam (ATIVAN) 1 MG tablet Take 1 tablet (1 mg) by mouth every 8 hours as needed for anxiety 30 tablet 0     Cholecalciferol (VITAMIN D3) 3000 UNITS TABS Take 3,000 Int'l Units by mouth daily 30 tablet 3     zonisamide (ZONEGRAN) 25 MG capsule Take 1 tablet (25 mg) once daily for 1 week, then 2 tablets once daily for 1 week, then 3 tablets once daily for 1 week, then 4 tablets once daily for 1 week. 90 capsule 1                                 Facility-Administered Encounter Medications as of 1/9/2018   Medication Dose Route Frequency Provider Last Rate Last Dose     lidocaine 1 % 9 mL  9 mL Intradermal Once Anna Naidu MD           sodium bicarbonate 8.4 % injection 1 mEq  1 mEq Intradermal Once Anna Naidu MD                                Review Of Systems  Skin: As above  Eyes: negative  Ears/Nose/Throat: negative  Respiratory: No shortness of breath, dyspnea on exertion, cough, or hemoptysis  Cardiovascular: negative  Gastrointestinal: negative  Genitourinary: negative  Musculoskeletal: negative  Neurologic: negative  Psychiatric: negative  Hematologic/Lymphatic/Immunologic: negative  Endocrine: negative          O:                                   NAD, WDWN, Alert & Oriented, Mood & Affect wnl, Vitals stable                                         Here today alone                                         /74 (BP Location: Left arm, Patient Position: Sitting, Cuff Size: Adult Large)  Pulse 79  Temp 98.7  F (37.1  C) (Tympanic)                                         General appearance normal                                         Vitals stable                                         Alert, oriented and in no acute distress                                             Following lymph nodes palpated: Occipital, Cervical, Supraclavicular , axilla, inguinal, femoral no lad                                         Stuck on papules and brown macules on trunk and ext                                           Pink papules on trunk                           Red papules on trunk               The remainder of the full exam was unremarkable; the following areas were examined:  conjunctiva/lids, oral mucosa, neck, peripheral vascular system, abdomen, lymph nodes, digits/nails, eccrine and apocrine glands, scalp/hair, face, neck, chest, abdomen, buttocks, back, RUE, LUE, RLE, LLE                                                         Eyes: Conjunctivae/lids:Normal                                                     ENT: Lips, buccal mucosa, tongue: normal                                                    MSK:Normal                                                    Cardiovascular: peripheral edema none                                                    Pulm: Breathing Normal                                                    Lymph Nodes: No Head and Neck Lymphadenopathy                                                     Neuro/Psych: Orientation:Normal; Mood/Affect:Normal          A/P:  1. Metastatic melanoma, seborrheic keratosis, lentigo, dermal nevi, angioma, hx of basal cell carcinoma      MELANOMA DISCUSSED WITH PATIENT:  I discussed the specifics of tumor, prognosis, metachronous melanoma, self exam, and genetics with the patient. I explained the need for monthly skin exams including and taught the patient how to do this. Patient was asked about new or changing moles and a full skin exam was performed.   BENIGN LESIONS DISCUSSED WITH PATIENT:  I discussed the specifics of tumor, prognosis, and genetics of benign lesions.  I explained that treatment of these lesions would be purely cosmetic and not medically neccessary.  I discussed with patient different removal options including excision, cautery and /or laser.        Nature and genetics of benign skin lesions dicussed with patient.  Signs and Symptoms of skin cancer discussed with patient.  ABCDEs of melanoma reviewed with  patient.  Patient encouraged to perform monthly skin exams.  UV precautions reviewed with patient.  Skin care regimen reviewed with patient: Eliminate harsh soaps, i.e. Dial, zest, irsih spring; Mild soaps such as Cetaphil or Dove sensitive skin, avoid hot or cold showers, aggressive use of emollients including vanicream, cetaphil or cerave discussed with patient.    Risks of non-melanoma skin cancer discussed with patient   Return to clinic 3 months      Again, thank you for allowing me to participate in the care of your patient.        Sincerely,        Lowell Bullock MD

## 2018-11-28 NOTE — NURSING NOTE
"Initial /81 (BP Location: Right arm, Patient Position: Sitting, Cuff Size: Adult Large)  Pulse 83  SpO2 96% Estimated body mass index is 40.57 kg/(m^2) as calculated from the following:    Height as of 11/13/18: 1.6 m (5' 3\").    Weight as of 11/14/18: 103.9 kg (229 lb). .      "

## 2018-11-28 NOTE — PROGRESS NOTES
Doretha Fernandez is a 43 year old year old female patient here today for f/u h xof melanoma unknown primary.  She went to Dr. Cool for node dissection.1 out of 20 nodes positive.  No adjuvant Radiation given no survvial advantage.   MRI of brain and PET scan both clear.  Was on  Nivolumab got 8 injection but got colitis.  She went to [place].   She was treated with high dose steroids for colitis and had a dx of bacterial pneumonia.   She was admitted for to hospital for this. She is followed by GI and is on pred taper.  She was started on Entyvio.  Scans have been clear.  She is back on Nivo with prednisone.    She denies any new or changing skin lesions.  Patient reports the following modifying factors none.  Associated symptoms: none.  Patient has no other skin complaints today.  Remainder of the HPI, Meds, PMH, Allergies, FH, and SH was reviewed in chart.  Current PET-CT scan, physical exam and laboratory tests demonstrate no evidence of melanoma recurrence.       Pertinent Hx:                Melanoma metastatic      Past Medical History               Past Medical History:   Diagnosis Date     Abnormal MRI         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      Anxiety        Depression        Lumbago         left lower back pain     Malignant melanoma (H)        Mild persistent asthma        Prothrombin deficiency (H)         takes 81mg asa daily     Stroke (cerebrum) (H)       During      TIA (transient ischemic attack)                    Past Surgical History                  Past Surgical History:   Procedure Laterality Date     COLONOSCOPY N/A 10/18/2017      Procedure: COLONOSCOPY;  Colon;  Surgeon: Debbie Stephens MD;  [facility]     DISSECT LYMPH NODE AXILLA Left 10/23/2017      Procedure: DISSECT LYMPH NODE AXILLA;  Left Axillary Lymph Node Dissection ;  Surgeon: Laurent Cool MD;  Location: [facility] OR     GYN SURGERY                 REPAIR MOHS Left 2017       Procedure: REPAIR MOHS;  Left Upper Lid Moh's Reconstruction;  Surgeon: Kisha Bosch MD;  [facility]                   Family History                     Family History   Problem Relation Age of Onset     CANCER Mother 45         lung     Neurologic Disorder Mother        Lipids Father        GASTROINTESTINAL DISEASE Father        Depression Father        CANCER Maternal Grandmother        Blood Disease Maternal Grandmother        Arthritis Maternal Grandmother        DIABETES Maternal Grandmother        Depression Maternal Grandmother        Macular Degeneration Maternal Grandmother        Glaucoma Maternal Grandmother        DIABETES Maternal Grandfather        CEREBROVASCULAR DISEASE Maternal Grandfather        Blood Disease Maternal Grandfather        HEART DISEASE Maternal Grandfather        Glaucoma Maternal Grandfather        CANCER Paternal Grandmother        Cancer - colorectal Paternal Grandmother        Respiratory Paternal Grandfather        Blood Disease Paternal Grandfather        HEART DISEASE Daughter        Asthma Daughter        Depression Sister                      Social History    Social History                     Social History     Marital status:          Spouse name: N/A     Number of children: N/A     Years of education: N/A               Occupational History     Not on file.                        Social History Main Topics     Smoking status: Passive Smoke Exposure - Never Smoker         Last attempt to quit: 3/20/1998     Smokeless tobacco: Never Used     Alcohol use No            Comment: occ     Drug use: No     Sexual activity: Yes         Partners: Male         Birth control/ protection: Surgical                  Other Topics Concern     Parent/Sibling W/ Cabg, Mi Or Angioplasty Before 65f 55m? No               Social History Narrative      20 y.o- patient's mother   of lung cancer. She had to take care of her younger sister.      2012- patient's  had  "a heart attack with stents placed, followed by cardiac rehabilitation      November 2000 TO December 2012  was in [facility]      January 2013 patient's  went through alcohol rehabilitation at [place] inpatient                      They have attended couple counseling a couple of times and patient went to the family program for chemical dependency.      Patient denies alcohol or drug use and herself                      Has 2 children, girls ages 5 and 8      [employer] and also works for the [employer]. 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                                 Encounter Medications                     Outpatient Encounter Prescriptions as of 1/9/2018   Medication Sig Dispense Refill     venlafaxine (EFFEXOR-ER) 150 MG TB24 24 hr tablet Take 1 tablet (150 mg) by mouth daily (with breakfast) 30 each 0     order for DME Equipment being ordered: 20-30mmHg compression sleeve and 20-30mmHg compression glove\" x2 pair 1 Units 0     rOPINIRole (REQUIP) 0.25 MG tablet Take 1 tablet (0.25 mg) by mouth At Bedtime 30 tablet 11     cephALEXin (KEFLEX) 500 MG capsule Take 2 capsules (1,000 mg) by mouth 2 times daily 24 capsule 0     oxyCODONE IR (ROXICODONE) 5 MG tablet Take 1-3 tablets (5-15 mg) by mouth every 4 hours as needed for pain maximum 12 tablet(s) per day 40 tablet 0     aspirin 81 MG EC tablet Take 81 mg by mouth daily           acetaminophen (TYLENOL) 325 MG tablet Take 2 tablets (650 mg) by mouth every 4 hours as needed for other (mild pain) 100 tablet 0     senna-docusate (SENOKOT-S;PERICOLACE) 8.6-50 MG per tablet Take 1 tablet by mouth 2 times daily as needed for constipation 60 tablet 1     LORazepam (ATIVAN) 1 MG tablet Take 1 tablet (1 mg) by mouth every 8 hours as needed for anxiety 30 tablet 0     Cholecalciferol (VITAMIN D3) 3000 UNITS TABS Take 3,000 Int'l Units by mouth daily 30 tablet 3     zonisamide " (ZONEGRAN) 25 MG capsule Take 1 tablet (25 mg) once daily for 1 week, then 2 tablets once daily for 1 week, then 3 tablets once daily for 1 week, then 4 tablets once daily for 1 week. 90 capsule 1                                 Facility-Administered Encounter Medications as of 1/9/2018   Medication Dose Route Frequency Provider Last Rate Last Dose     lidocaine 1 % 9 mL  9 mL Intradermal Once Anna Naidu MD           sodium bicarbonate 8.4 % injection 1 mEq  1 mEq Intradermal Once Anna Naidu MD                                Review Of Systems  Skin: As above  Eyes: negative  Ears/Nose/Throat: negative  Respiratory: No shortness of breath, dyspnea on exertion, cough, or hemoptysis  Cardiovascular: negative  Gastrointestinal: negative  Genitourinary: negative  Musculoskeletal: negative  Neurologic: negative  Psychiatric: negative  Hematologic/Lymphatic/Immunologic: negative  Endocrine: negative          O:                                   NAD, WDWN, Alert & Oriented, Mood & Affect wnl, Vitals stable                                         Here today alone                                         /74 (BP Location: Left arm, Patient Position: Sitting, Cuff Size: Adult Large)  Pulse 79  Temp 98.7  F (37.1  C) (Tympanic)                                         General appearance normal                                         Vitals stable                                         Alert, oriented and in no acute distress                                             Following lymph nodes palpated: Occipital, Cervical, Supraclavicular , axilla, inguinal, femoral no lad                                         Stuck on papules and brown macules on trunk and ext                                          Pink papules on trunk                           Red papules on trunk               The remainder of the full exam was unremarkable; the following areas were examined:  conjunctiva/lids, oral mucosa, neck,  peripheral vascular system, abdomen, lymph nodes, digits/nails, eccrine and apocrine glands, scalp/hair, face, neck, chest, abdomen, buttocks, back, RUE, LUE, RLE, LLE                                                         Eyes: Conjunctivae/lids:Normal                                                     ENT: Lips, buccal mucosa, tongue: normal                                                    MSK:Normal                                                    Cardiovascular: peripheral edema none                                                    Pulm: Breathing Normal                                                    Lymph Nodes: No Head and Neck Lymphadenopathy                                                     Neuro/Psych: Orientation:Normal; Mood/Affect:Normal          A/P:  1. Metastatic melanoma, seborrheic keratosis, lentigo, dermal nevi, angioma, hx of basal cell carcinoma      MELANOMA DISCUSSED WITH PATIENT:  I discussed the specifics of tumor, prognosis, metachronous melanoma, self exam, and genetics with the patient. I explained the need for monthly skin exams including and taught the patient how to do this. Patient was asked about new or changing moles and a full skin exam was performed.   BENIGN LESIONS DISCUSSED WITH PATIENT:  I discussed the specifics of tumor, prognosis, and genetics of benign lesions.  I explained that treatment of these lesions would be purely cosmetic and not medically neccessary.  I discussed with patient different removal options including excision, cautery and /or laser.        Nature and genetics of benign skin lesions dicussed with patient.  Signs and Symptoms of skin cancer discussed with patient.  ABCDEs of melanoma reviewed with patient.  Patient encouraged to perform monthly skin exams.  UV precautions reviewed with patient.  Skin care regimen reviewed with patient: Eliminate harsh soaps, i.e. Dial, zest, irsih spring; Mild soaps such as Cetaphil or Dove sensitive skin,  avoid hot or cold showers, aggressive use of emollients including vanicream, cetaphil or cerave discussed with patient.    Risks of non-melanoma skin cancer discussed with patient   Return to clinic 3 months

## 2018-11-28 NOTE — MR AVS SNAPSHOT
After Visit Summary   11/28/2018    Doretha Fernandez    MRN: [id number]           Patient Information     Date Of Birth          1975        Visit Information        Provider Department      11/28/2018 9:30 AM Lowell Bullock MD [facility]        Today's Diagnoses     Metastatic melanoma (H)    -  1    Angioma        Dermal nevus        Lentigo        SK (seborrheic keratosis)        History of basal cell cancer           Follow-ups after your visit        Your next 10 appointments already scheduled     Jan 30, 2019 11:00 AM CST   Return Visit with Lowell Bullock MD   [facility]    [address] [address]   [phone]              Who to contact     If you have questions or need follow up information about today's clinic visit or your schedule please contact [facility] directly at [phone].  Normal or non-critical lab and imaging results will be communicated to you by MyChart, letter or phone within 4 business days after the clinic has received the results. If you do not hear from us within 7 days, please contact the clinic through [employer] or phone. If you have a critical or abnormal lab result, we will notify you by phone as soon as possible.  Submit refill requests through [employer] or call your pharmacy and they will forward the refill request to us. Please allow 3 business days for your refill to be completed.          Additional Information About Your Visit        MyChart Information     [employer] gives you secure access to your electronic health record. If you see a primary care provider, you can also send messages to your care team and make appointments. If you have questions, please call your primary care clinic.  If you do not have a primary care provider, please call [phone] and they will assist you.        Care EveryWhere ID     This is your Care EveryWhere ID. This could  be used by other organizations to access your [facility] medical records  [id number]        Your Vitals Were     Pulse Pulse Oximetry                83 96%           Blood Pressure from Last 3 Encounters:   11/28/18 136/81   11/14/18 104/64   11/13/18 108/68    Weight from Last 3 Encounters:   11/14/18 103.9 kg (229 lb)   06/13/18 94.3 kg (208 lb)   05/09/18 93.4 kg (206 lb)              Today, you had the following     No orders found for display       Primary Care Provider Office Phone # Fax #    Svetomir [address] [facility] [phone]       [facility] [address] [facility] [address]        Equal Access to Services     BRIGETTE QUARLES : Nicol Gustafson, wabryan valencia, paulina kaalmada chris, pancho castillo. So [facility] [phone].    ATENCIÓN: Si habla español, tiene a mcdonnell disposición servicios gratuitos de asistencia lingüística. Llame al [phone].    We comply with applicable federal civil rights laws and [place] laws. We do not discriminate on the basis of race, color, national origin, age, disability, sex, sexual orientation, or gender identity.            Thank you!     Thank you for choosing [facility]  for your care. Our goal is always to provide you with excellent care. Hearing back from our patients is one way we can continue to improve our services. Please take a few minutes to complete the written survey that you may receive in the mail after your visit with us. Thank you!             Your Updated Medication List - Protect others around you: Learn how to safely use, store and throw away your medicines at www.disposemymeds.org.          This list is accurate as of 11/28/18 11:19 AM.  Always use your most recent med list.                   Brand Name Dispense Instructions for use Diagnosis    alendronate 70 MG tablet    FOSAMAX     Take 70 mg by mouth        CALCIUM PO      Take 800 mg by mouth daily        HYDROmorphone 4 MG tablet     "DILAUDID    30 tablet    Take 1-1.5 tablets (4-6 mg) by mouth every 3 hours as needed for moderate to severe pain    Proctocolitis       LORazepam 0.5 MG tablet    ATIVAN    10 tablet    Take 1-2 tablets (0.5-1 mg) by mouth every 6 hours as needed for anxiety or other (nausea)    Proctocolitis, Anxiety state, Metastatic malignant melanoma (H)       * order for DME     1 Units    Equipment being ordered: 20-30mmHg compression sleeve and 20-30mmHg compression glove\" x2 pair    Lymphedema of left arm       * order for DME     1 each    Equipment being ordered: x2 Weare[employer] compression shirt    Secondary lymphedema       * predniSONE 10 MG tablet    DELTASONE    100 tablet    Take 2 tablets (20 mg) by mouth daily    Malignant melanoma of left upper extremity including shoulder (H), Colitis       * predniSONE 20 MG tablet    DELTASONE     60mg daily; further taper instructions to follow on 11/26        * pregabalin 75 MG capsule    LYRICA     Take 75 mg by mouth        * pregabalin 75 MG capsule    LYRICA    60 capsule    Take 1 capsule (75 mg) by mouth 2 times daily    Inflammatory arthritis       promethazine 25 MG tablet    PHENERGAN    56 tablet    Take 1 tablet (25 mg) by mouth every 4 hours as needed for nausea or vomiting    Proctocolitis       rOPINIRole 0.25 MG tablet    REQUIP    30 tablet    Take 1 tablet (0.25 mg) by mouth At Bedtime    Restless leg syndrome       sulfamethoxazole-trimethoprim 800-160 MG tablet    BACTRIM DS/SEPTRA DS     1 tablet po on Mondays, Wednesdays and Fridays        TYLENOL PO      Take 1,000 mg by mouth every 8 hours as needed for mild pain or fever        venlafaxine 225 MG 24 hr tablet    EFFEXOR-ER     Take 225 mg by mouth daily        Vitamin D3 3000 units Tabs     90 tablet    Take 3,000 Int'l Units by mouth daily    Vitamin D deficiency       ZOFRAN 8 MG tablet   Generic drug:  ondansetron      Take by mouth every 8 hours as needed for nausea        * Notice:  This list has 6 " medication(s) that are the same as other medications prescribed for you. Read the directions carefully, and ask your doctor or other care provider to review them with you.

## 2018-12-03 DIAGNOSIS — G25.81 RESTLESS LEG SYNDROME: ICD-10-CM

## 2018-12-03 RX ORDER — ROPINIROLE 0.25 MG/1
0.25 TABLET, FILM COATED ORAL AT BEDTIME
Qty: 30 TABLET | Refills: 11 | Status: CANCELLED | OUTPATIENT
Start: 2018-12-03

## 2018-12-03 NOTE — TELEPHONE ENCOUNTER
Left message for patient to return call to clinic.  She is to f/u in clinic with Dr. Naidu around 12-12-18 regarding new med, Lyrica.  LOV discussing RLS was with Dr. KEREN Harris.  Reeder she have enough meds to get to an appt to discuss lyrica and RLS?  Nohemy MOLINA RN

## 2018-12-04 ENCOUNTER — TELEPHONE (OUTPATIENT)
Dept: FAMILY MEDICINE | Facility: CLINIC | Age: 43
End: 2018-12-04

## 2018-12-04 DIAGNOSIS — Z87.898 H/O MOTION SICKNESS: Primary | ICD-10-CM

## 2018-12-04 RX ORDER — ROPINIROLE 0.25 MG/1
0.25 TABLET, FILM COATED ORAL AT BEDTIME
Qty: 30 TABLET | Refills: 0 | Status: SHIPPED | OUTPATIENT
Start: 2018-12-04 | End: 2019-07-16

## 2018-12-05 RX ORDER — SCOLOPAMINE TRANSDERMAL SYSTEM 1 MG/1
PATCH, EXTENDED RELEASE TRANSDERMAL
Qty: 2 PATCH | Refills: 0 | Status: SHIPPED | OUTPATIENT
Start: 2018-12-05 | End: 2019-01-28

## 2018-12-18 ENCOUNTER — TRANSFERRED RECORDS (OUTPATIENT)
Dept: HEALTH INFORMATION MANAGEMENT | Facility: CLINIC | Age: 43
End: 2018-12-18

## 2018-12-18 LAB
ALT SERPL-CCNC: 37 U/L (ref 7–45)
AST SERPL-CCNC: 24 U/L (ref 8–43)
CREAT SERPL-MCNC: 0.89 MG/DL (ref 0.59–1.04)
GFR SERPL CREATININE-BSD FRML MDRD: 80 ML/MIN/BSA
GLUCOSE SERPL-MCNC: 126 MG/DL (ref 70–140)
POTASSIUM SERPL-SCNC: 4.2 MMOL/L (ref 3.6–5.2)
TSH SERPL-ACNC: 0.7 MIU/L (ref 0.3–4.2)

## 2018-12-19 ENCOUNTER — TRANSFERRED RECORDS (OUTPATIENT)
Dept: HEALTH INFORMATION MANAGEMENT | Facility: CLINIC | Age: 43
End: 2018-12-19

## 2018-12-20 ENCOUNTER — TRANSFERRED RECORDS (OUTPATIENT)
Dept: HEALTH INFORMATION MANAGEMENT | Facility: CLINIC | Age: 43
End: 2018-12-20

## 2019-01-05 NOTE — TELEPHONE ENCOUNTER
Please follow-up with your [facility].  Return to the emergency department for increased symptomatology.   Routing refill request to provider for review/approval because:  Medication is reported/historical

## 2019-01-17 DIAGNOSIS — M19.90 INFLAMMATORY ARTHRITIS: ICD-10-CM

## 2019-01-17 NOTE — TELEPHONE ENCOUNTER
Lyrica      Last Written Prescription Date:  11/14/18  Last Fill Quantity: 60,   # refills: 1  Last Office Visit: 11/14/18  Future Office visit:    Next 5 appointments (look out 90 days)    Jan 30, 2019 11:00 AM CST  [facility] Visit with Lowell Bullock MD  [facility] [address] [place] [address]  [phone]           Routing refill request to provider for review/approval because:

## 2019-01-17 NOTE — TELEPHONE ENCOUNTER
I have attempted to contact this patient by phone with the following results: left message to return my call on answering machine.    Trang Alatorre RN

## 2019-01-17 NOTE — TELEPHONE ENCOUNTER
Start Lyrica - will titrate up slowly given her history of sensitivity and side effects with medication.  Discussed that after a week we could increase from 150 mg/day to 300 mg/day, but I will plan to see her back in 1 month for a recheck.     Continue follow up with [place] [facility] as planned.        Anna Naidu M.D.         Above is my note from 11/14/2018 when the Lyrica was prescribed.  She was to have followed up in 1 month for titration of the dose of Lyrica.  RN to please call patient and inquire as to her pain and any side effects of the Lyrica.  If she wants to adjust dose, please consider making a phone encounter so I can talk to her.  If she wants to remain at the same dose, I will refill x 3 months.    Anna Naidu M.D.

## 2019-01-17 NOTE — TELEPHONE ENCOUNTER
Routing refill request to provider for review/approval because:  Drug not on the [facility] refill protocol     Trang Alatorre RN

## 2019-01-18 NOTE — TELEPHONE ENCOUNTER
Pt called back and would like a call    Floridalma Ortega  [facility]

## 2019-01-21 ENCOUNTER — TELEPHONE (OUTPATIENT)
Dept: FAMILY MEDICINE | Facility: CLINIC | Age: 44
End: 2019-01-21

## 2019-01-21 RX ORDER — PREGABALIN 75 MG/1
75 CAPSULE ORAL 2 TIMES DAILY
Qty: 60 CAPSULE | Refills: 1 | Status: SHIPPED | OUTPATIENT
Start: 2019-01-21 | End: 2019-03-18

## 2019-01-21 NOTE — TELEPHONE ENCOUNTER
Medication Notes          BARRINGTON CASTELLON   Mon Jan 21, 2019  4:12 PM  Paper rx faxed to [facility]. Confirmed Sent . Barrington MOLINA CMA

## 2019-01-21 NOTE — TELEPHONE ENCOUNTER
Rx faxed to [facility] at same current dose. She can discuss with Rheumatology at her next appointment. AMADA Hand CNP

## 2019-01-21 NOTE — TELEPHONE ENCOUNTER
Patient would like to wait for any adjusting until she sees the [facility] again next week.  Patient is out of the medication.  Patient needs refill tonight.  Please review and advise.     Thank you    Polly BAJWA RN

## 2019-01-22 NOTE — TELEPHONE ENCOUNTER
This medication refill was addressed in separate encounter. See 1/21/19 encounter. Refill was provided/ faxed and patient was notified. Renee SIMPSON RN

## 2019-01-28 ENCOUNTER — OFFICE VISIT (OUTPATIENT)
Dept: FAMILY MEDICINE | Facility: CLINIC | Age: 44
End: 2019-01-28
Payer: COMMERCIAL

## 2019-01-28 VITALS
DIASTOLIC BLOOD PRESSURE: 74 MMHG | BODY MASS INDEX: 43.23 KG/M2 | TEMPERATURE: 98 F | HEIGHT: 63 IN | RESPIRATION RATE: 20 BRPM | OXYGEN SATURATION: 98 % | SYSTOLIC BLOOD PRESSURE: 110 MMHG | WEIGHT: 244 LBS | HEART RATE: 91 BPM

## 2019-01-28 DIAGNOSIS — S93.492A SPRAIN OF POSTERIOR TALOFIBULAR LIGAMENT OF LEFT ANKLE, INITIAL ENCOUNTER: ICD-10-CM

## 2019-01-28 DIAGNOSIS — H10.33 ACUTE CONJUNCTIVITIS OF BOTH EYES, UNSPECIFIED ACUTE CONJUNCTIVITIS TYPE: ICD-10-CM

## 2019-01-28 DIAGNOSIS — G89.29 OTHER CHRONIC PAIN: Primary | ICD-10-CM

## 2019-01-28 PROCEDURE — 99214 OFFICE O/P EST MOD 30 MIN: CPT | Performed by: FAMILY MEDICINE

## 2019-01-28 RX ORDER — PREGABALIN 25 MG/1
25 CAPSULE ORAL 2 TIMES DAILY
Qty: 180 CAPSULE | Refills: 3 | Status: SHIPPED | OUTPATIENT
Start: 2019-01-28 | End: 2019-11-15 | Stop reason: DRUGHIGH

## 2019-01-28 ASSESSMENT — MIFFLIN-ST. JEOR: SCORE: 1735.91

## 2019-01-28 NOTE — PROGRESS NOTES
"  SUBJECTIVE:   Doretha Fernandez is a 43 year old female who presents to clinic today for the following health issues:      Chief Complaint   Patient presents with     Eye Problem     Patient noticed this weekend bilateral eyes are heavy feeling, red and crusted shut. Patient is swollen through out body and is unsure if it is related to medication or infection. Patient has minimal itching in her eyes. Patient has not been treating eye discomfort at home.      Foot Problems     Patient noticed X 2 weeks ago her feet became numb and she stood up and put all her weight on left ankle and since it is swollen. Patient is unable to rate pain since being on pain medication.        SUBJECTIVE:  Doretha Fernandez, a 43 year old female scheduled an appointment to discuss the following issues:     Other chronic pain  Sprain of posterior talofibular ligament of left ankle, initial encounter  Acute conjunctivitis of both eyes, unspecified acute conjunctivitis type     Patient here primarily because of bilateral eye injection, crusting, feeling \"heavy\".  Started 3-4 days ago, the redness is improving, still having some crusting.  Works with the public.  Nobody else ill at home, she denies URI/sinus pain or symptoms.     A few weeks ago she got up from the couch and put her weight on an inverted ankle.  She was able to walk after the injury and has been walking on it since, would like it looked at, still slightly swollen and painful, but has been doing home exercises.    Patient describes feeling \"miserable in her skin\".  She has been on rather high dose steroids for several months.  She has a history of malignant melanoma and developed arthritis and drug induced colitis after [employer].  Has been on prednisone almost a year now and has been unsuccessful with tapering as her joint pain/swelling seems to recur.  She has apts at [place] with GI and for a sleep evaluation tomorrow.  She is on Lyrica 75 mg twice daily but is wondering if we'd be " "able to increase that so that she might be able to decrease her prednisone.     Wt Readings from Last 5 Encounters:   01/28/19 110.7 kg (244 lb)   11/14/18 103.9 kg (229 lb)   06/13/18 94.3 kg (208 lb)   05/09/18 93.4 kg (206 lb)   05/09/18 96.9 kg (213 lb 10 oz)         Medical, social, surgical, and family histories reviewed.    ROS:  5 point ROS negative except as noted above in HPI, including Gen., Resp., CV, GI &  system review.    OBJECTIVE:  /74   Pulse 91   Temp 98  F (36.7  C) (Tympanic)   Resp 20   Ht 1.6 m (5' 3\")   Wt 110.7 kg (244 lb)   SpO2 98%   BMI 43.22 kg/m    EXAM:  GENERAL APPEARANCE ADULT: tearful, moon facies, buffalo hump  EYES: conjunctival erythema bilateral, crusting, mattering present bilateral  RESP: lungs clear to auscultation   CV: normal rate, regular rhythm, no murmur or gallop  ABDOMEN: soft, no organomegaly, masses or tenderness  MS: ankle exam: normal appearance, bruising around the medial malleolus, non-tender at the medial malleolus, lateral malleolus and anterior joint, range of motion: normal, no instability with drawer test or rocking the mortice, strength normal  PSYCH: mentation appears normal., tearful    ASSESSMENT/PLAN:    (G89.29) Other chronic pain  (primary encounter diagnosis)  Comment:    Plan: pregabalin (LYRICA) 25 MG capsule        Increase to 100 mg twice daily - has 75 mg capsules at home and just refilled    (S93.492A) Sprain of posterior talofibular ligament of left ankle, initial encounter  Comment:    Plan: continue HEP    (H10.33) Acute conjunctivitis of both eyes, unspecified acute conjunctivitis type  Comment:    Plan: likely viral, no drops indicated at this time but asked her to call in a few days if worsening. Warm washcloth, cold compresses.    Anna Naidu M.D.          Patient Instructions   Increase Lyrica to 100 mg twice daily (add 25 mg to the 75 mg you are already taking).      Our Clinic hours are:  Mondays    7:20 am - 7 " pm  Tues -  Fri  7:20 am - 5 pm    Clinic Phone: [phone]    The clinic lab opens at 7:30 am Mon - Fri and appointments are required.    [facility]. [phone]  Monday  8 am - 7pm  Tues - Fri 8 am - 5:30 pm

## 2019-01-28 NOTE — PATIENT INSTRUCTIONS
Increase Lyrica to 100 mg twice daily (add 25 mg to the 75 mg you are already taking).      Our Clinic hours are:  Mondays    7:20 am - 7 pm  Tues -  Fri  7:20 am - 5 pm    Clinic Phone: [phone]    The clinic lab opens at 7:30 am Mon - Fri and appointments are required.    [facility]  Ph. [phone]  Monday  8 am - 7pm  Tues - Fri 8 am - 5:30 pm

## 2019-01-29 ASSESSMENT — ASTHMA QUESTIONNAIRES: ACT_TOTALSCORE: 25

## 2019-02-05 ENCOUNTER — TRANSFERRED RECORDS (OUTPATIENT)
Dept: HEALTH INFORMATION MANAGEMENT | Facility: CLINIC | Age: 44
End: 2019-02-05

## 2019-02-18 ENCOUNTER — OFFICE VISIT (OUTPATIENT)
Dept: DERMATOLOGY | Facility: CLINIC | Age: 44
End: 2019-02-18
Payer: COMMERCIAL

## 2019-02-18 VITALS — SYSTOLIC BLOOD PRESSURE: 141 MMHG | DIASTOLIC BLOOD PRESSURE: 80 MMHG | HEART RATE: 120 BPM | OXYGEN SATURATION: 96 %

## 2019-02-18 DIAGNOSIS — D18.00 ANGIOMA: ICD-10-CM

## 2019-02-18 DIAGNOSIS — C43.9 METASTATIC MELANOMA (H): Primary | ICD-10-CM

## 2019-02-18 DIAGNOSIS — Z85.828 HISTORY OF SKIN CANCER: ICD-10-CM

## 2019-02-18 DIAGNOSIS — L81.4 LENTIGO: ICD-10-CM

## 2019-02-18 DIAGNOSIS — L24.9 IRRITANT DERMATITIS: ICD-10-CM

## 2019-02-18 DIAGNOSIS — D23.9 DERMAL NEVUS: ICD-10-CM

## 2019-02-18 DIAGNOSIS — L82.1 SEBORRHEIC KERATOSIS: ICD-10-CM

## 2019-02-18 PROCEDURE — 99213 OFFICE O/P EST LOW 20 MIN: CPT | Performed by: DERMATOLOGY

## 2019-02-18 RX ORDER — FLUOCINONIDE 0.5 MG/G
CREAM TOPICAL
Qty: 120 G | Refills: 3 | Status: SHIPPED | OUTPATIENT
Start: 2019-02-18 | End: 2020-02-12

## 2019-02-18 NOTE — PROGRESS NOTES
Doretha Fernandez is a 43 year old year old female patient here today for f/u h xof melanoma unknown primary.  She went to Dr. Cool for node dissection.1 out of 20 nodes positive.  No adjuvant Radiation given no survvial advantage.   MRI of brain and PET scan both clear.  Was on  Nivolumab got 8 injection but got colitis.  She went to [place].   She was treated with high dose steroids for colitis and had a dx of bacterial pneumonia.   She was admitted for to hospital for this. She is followed by GI and is on pred taper.  She was started on Entyvio.  Scans have been clear.  She is off of melanoma meds.  She was also dx with seronegative rheumatoid arthritis and is to start tocilizumab.  Prednisone has made her gain weight and get rash on legs.   She denies any new or changing skin lesions.  Patient reports the following modifying factors none.  Associated symptoms: none.  Patient has no other skin complaints today.  Remainder of the HPI, Meds, PMH, Allergies, FH, and SH was reviewed in chart.  Current PET-CT scan, physical exam and laboratory tests demonstrate no evidence of melanoma recurrence.       Pertinent Hx:                Melanoma metastatic      Past Medical History               Past Medical History:   Diagnosis Date     Abnormal MRI         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      Anxiety        Depression        Lumbago         left lower back pain     Malignant melanoma (H)        Mild persistent asthma        Prothrombin deficiency (H)         takes 81mg asa daily     Stroke (cerebrum) (H)       During      TIA (transient ischemic attack)                    Past Surgical History                  Past Surgical History:   Procedure Laterality Date     COLONOSCOPY N/A 10/18/2017      Procedure: COLONOSCOPY;  Colon;  Surgeon: Debbie Stephens MD;  [facility]     DISSECT LYMPH NODE AXILLA Left 10/23/2017      Procedure: DISSECT LYMPH NODE AXILLA;  Left Axillary Lymph Node  Dissection ;  Surgeon: Laurent Cool MD;  Location: [facility] OR     GYN SURGERY                 REPAIR MOHS Left 2017      Procedure: REPAIR MOHS;  Left Upper Lid Moh's Reconstruction;  Surgeon: Kisha Bosch MD;  Location:  OR                   Family History                     Family History   Problem Relation Age of Onset     CANCER Mother 45         lung     Neurologic Disorder Mother        Lipids Father        GASTROINTESTINAL DISEASE Father        Depression Father        CANCER Maternal Grandmother        Blood Disease Maternal Grandmother        Arthritis Maternal Grandmother        DIABETES Maternal Grandmother        Depression Maternal Grandmother        Macular Degeneration Maternal Grandmother        Glaucoma Maternal Grandmother        DIABETES Maternal Grandfather        CEREBROVASCULAR DISEASE Maternal Grandfather        Blood Disease Maternal Grandfather        HEART DISEASE Maternal Grandfather        Glaucoma Maternal Grandfather        CANCER Paternal Grandmother        Cancer - colorectal Paternal Grandmother        Respiratory Paternal Grandfather        Blood Disease Paternal Grandfather        HEART DISEASE Daughter        Asthma Daughter        Depression Sister                      Social History    Social History                     Social History     Marital status:          Spouse name: N/A     Number of children: N/A     Years of education: N/A               Occupational History     Not on file.                        Social History Main Topics     Smoking status: Passive Smoke Exposure - Never Smoker         Last attempt to quit: 3/20/1998     Smokeless tobacco: Never Used     Alcohol use No            Comment: occ     Drug use: No     Sexual activity: Yes         Partners: Male         Birth control/ protection: Surgical                  Other Topics Concern     Parent/Sibling W/ Cabg, Mi Or Angioplasty Before 65f 55m? No               Social History  "Narrative      20 y.o- patient's mother   of lung cancer. She had to take care of her younger sister.      2012- patient's  had a heart attack with stents placed, followed by cardiac rehabilitation      2000 TO 2012  was in [facility] for depression      2013 patient's  went through alcohol rehabilitation at [place] inpatient                      They have attended couple counseling a couple of times and patient went to the family program for chemical dependency.      Patient denies alcohol or drug use and herself                      Has 2 children, girls ages 5 and 8      [employer] and also works for the "[employer]. 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                                 Encounter Medications                     Outpatient Encounter Prescriptions as of 2018   Medication Sig Dispense Refill     venlafaxine (EFFEXOR-ER) 150 MG TB24 24 hr tablet Take 1 tablet (150 mg) by mouth daily (with breakfast) 30 each 0     order for DME Equipment being ordered: 20-30mmHg compression sleeve and 20-30mmHg compression glove\" x2 pair 1 Units 0     rOPINIRole (REQUIP) 0.25 MG tablet Take 1 tablet (0.25 mg) by mouth At Bedtime 30 tablet 11     cephALEXin (KEFLEX) 500 MG capsule Take 2 capsules (1,000 mg) by mouth 2 times daily 24 capsule 0     oxyCODONE IR (ROXICODONE) 5 MG tablet Take 1-3 tablets (5-15 mg) by mouth every 4 hours as needed for pain maximum 12 tablet(s) per day 40 tablet 0     aspirin 81 MG EC tablet Take 81 mg by mouth daily           acetaminophen (TYLENOL) 325 MG tablet Take 2 tablets (650 mg) by mouth every 4 hours as needed for other (mild pain) 100 tablet 0     senna-docusate (SENOKOT-S;PERICOLACE) 8.6-50 MG per tablet Take 1 tablet by mouth 2 times daily as needed for constipation 60 tablet 1     LORazepam (ATIVAN) 1 MG tablet Take 1 tablet (1 mg) by mouth every 8 hours as needed for " anxiety 30 tablet 0     Cholecalciferol (VITAMIN D3) 3000 UNITS TABS Take 3,000 Int'l Units by mouth daily 30 tablet 3     zonisamide (ZONEGRAN) 25 MG capsule Take 1 tablet (25 mg) once daily for 1 week, then 2 tablets once daily for 1 week, then 3 tablets once daily for 1 week, then 4 tablets once daily for 1 week. 90 capsule 1                                 Facility-Administered Encounter Medications as of 1/9/2018   Medication Dose Route Frequency Provider Last Rate Last Dose     lidocaine 1 % 9 mL  9 mL Intradermal Once Anna Naidu MD           sodium bicarbonate 8.4 % injection 1 mEq  1 mEq Intradermal Once Anna Naidu MD                                Review Of Systems  Skin: As above  Eyes: negative  Ears/Nose/Throat: negative  Respiratory: No shortness of breath, dyspnea on exertion, cough, or hemoptysis  Cardiovascular: negative  Gastrointestinal: negative  Genitourinary: negative  Musculoskeletal: negative  Neurologic: negative  Psychiatric: negative  Hematologic/Lymphatic/Immunologic: negative  Endocrine: negative          O:                                   NAD, WDWN, Alert & Oriented, Mood & Affect wnl, Vitals stable                                         Here today alone                                         /74 (BP Location: Left arm, Patient Position: Sitting, Cuff Size: Adult Large)  Pulse 79  Temp 98.7  F (37.1  C) (Tympanic)                                         General appearance normal                                         Vitals stable                                         Alert, oriented and in no acute distress                                             Following lymph nodes palpated: Occipital, Cervical, Supraclavicular , axilla, inguinal, femoral no lad                                         Stuck on papules and brown macules on trunk and ext                                          Pink papules on trunk                           Red papules on trunk          eczemaotus patches medial thighs       The remainder of the full exam was unremarkable; the following areas were examined:  conjunctiva/lids, oral mucosa, neck, peripheral vascular system, abdomen, lymph nodes, digits/nails, eccrine and apocrine glands, scalp/hair, face, neck, chest, abdomen, buttocks, back, RUE, LUE, RLE, LLE                                                         Eyes: Conjunctivae/lids:Normal                                                     ENT: Lips, buccal mucosa, tongue: normal                                                    MSK:Normal                                                    Cardiovascular: peripheral edema none                                                    Pulm: Breathing Normal                                                    Lymph Nodes: No Head and Neck Lymphadenopathy                                                     Neuro/Psych: Orientation:Normal; Mood/Affect:Normal          A/P:  1. Metastatic melanoma, seborrheic keratosis, lentigo, dermal nevi, angioma, hx of basal cell carcinoma      2. Thighs irritatant derm  Lidex prn  Body glide daily    MELANOMA DISCUSSED WITH PATIENT:  I discussed the specifics of tumor, prognosis, metachronous melanoma, self exam, and genetics with the patient. I explained the need for monthly skin exams including and taught the patient how to do this. Patient was asked about new or changing moles and a full skin exam was performed.   BENIGN LESIONS DISCUSSED WITH PATIENT:  I discussed the specifics of tumor, prognosis, and genetics of benign lesions.  I explained that treatment of these lesions would be purely cosmetic and not medically neccessary.  I discussed with patient different removal options including excision, cautery and /or laser.        Nature and genetics of benign skin lesions dicussed with patient.  Signs and Symptoms of skin cancer discussed with patient.  ABCDEs of melanoma reviewed with patient.  Patient  encouraged to perform monthly skin exams.  UV precautions reviewed with patient.  Skin care regimen reviewed with patient: Eliminate harsh soaps, i.e. Dial, zest, irsih spring; Mild soaps such as Cetaphil or Dove sensitive skin, avoid hot or cold showers, aggressive use of emollients including vanicream, cetaphil or cerave discussed with patient.    Risks of non-melanoma skin cancer discussed with patient   Return to clinic 3 months

## 2019-02-18 NOTE — NURSING NOTE
Chief Complaint   Patient presents with     Skin Check       Vitals:    02/18/19 1337   BP: 141/80   Pulse: 120   SpO2: 96%     Wt Readings from Last 1 Encounters:   01/28/19 110.7 kg (244 lb)       Tammie Gil LPN.................2/18/2019

## 2019-02-18 NOTE — LETTER
2019         RE: Doretha Fernandez  [id number]  [place] [address]        Dear Colleague,    Thank you for referring your patient, Doretha Fernandez, to the [facility]. Please see a copy of my visit note below.    Doretha Fernandez is a 43 year old year old female patient here today for f/u h xof melanoma unknown primary.  She went to Dr. Cool for node dissection.1 out of 20 nodes positive.  No adjuvant Radiation given no survvial advantage.   MRI of brain and PET scan both clear.  Was on  Nivolumab got 8 injection but got colitis.  She went to [place].   She was treated with high dose steroids for colitis and had a dx of bacterial pneumonia.   She was admitted for to hospital for this. She is followed by GI and is on pred taper.  She was started on Entyvio.  Scans have been clear.   She is off of melanoma meds.  She was also dx with seronegative rheumatoid arthritis and is to start tocilizumab.  Prednisone has made her gain weight and get rash on legs.   She denies any new or changing skin lesions.  Patient reports the following modifying factors none.  Associated symptoms: none.  Patient has no other skin complaints today.  Remainder of the HPI, Meds, PMH, Allergies, FH, and SH was reviewed in chart.  Current PET-CT scan, physical exam and laboratory tests demonstrate no evidence of melanoma recurrence.       Pertinent Hx:                Melanoma metastatic      Past Medical History               Past Medical History:   Diagnosis Date     Abnormal MRI         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      Anxiety        Depression        Lumbago         left lower back pain     Malignant melanoma (H)        Mild persistent asthma        Prothrombin deficiency (H)         takes 81mg asa daily     Stroke (cerebrum) (H)       During      TIA (transient ischemic attack)                    Past Surgical History                  Past Surgical History:   Procedure  Laterality Date     COLONOSCOPY N/A 10/18/2017      Procedure: COLONOSCOPY;  Colon;  Surgeon: Debbie Stephens MD;  [facility]     DISSECT LYMPH NODE AXILLA Left 10/23/2017      Procedure: DISSECT LYMPH NODE AXILLA;  Left Axillary Lymph Node Dissection ;  Surgeon: Laurent Cool MD;  [facility]     GYN SURGERY                 REPAIR MOHS Left 2017      Procedure: REPAIR MOHS;  Left Upper Lid Moh's Reconstruction;  Surgeon: Kisha Bosch MD;  [facility]                   Family History                     Family History   Problem Relation Age of Onset     CANCER Mother 45         lung     Neurologic Disorder Mother        Lipids Father        GASTROINTESTINAL DISEASE Father        Depression Father        CANCER Maternal Grandmother        Blood Disease Maternal Grandmother        Arthritis Maternal Grandmother        DIABETES Maternal Grandmother        Depression Maternal Grandmother        Macular Degeneration Maternal Grandmother        Glaucoma Maternal Grandmother        DIABETES Maternal Grandfather        CEREBROVASCULAR DISEASE Maternal Grandfather        Blood Disease Maternal Grandfather        HEART DISEASE Maternal Grandfather        Glaucoma Maternal Grandfather        CANCER Paternal Grandmother        Cancer - colorectal Paternal Grandmother        Respiratory Paternal Grandfather        Blood Disease Paternal Grandfather        HEART DISEASE Daughter        Asthma Daughter        Depression Sister                      Social History    Social History                     Social History     Marital status:          Spouse name: N/A     Number of children: N/A     Years of education: N/A               Occupational History     Not on file.                        Social History Main Topics     Smoking status: Passive Smoke Exposure - Never Smoker         Last attempt to quit: 3/20/1998     Smokeless tobacco: Never Used     Alcohol use No            Comment: occ      "Drug use: No     Sexual activity: Yes         Partners: Male         Birth control/ protection: Surgical                  Other Topics Concern     Parent/Sibling W/ Cabg, Mi Or Angioplasty Before 65f 55m? No               Social History Narrative      20 y.o- patient's mother   of lung cancer. She had to take care of her younger sister.      2012- patient's  had a heart attack with stents placed, followed by cardiac rehabilitation      2000 TO 2012  was in [facility] for depression      2013 patient's  went through alcohol rehabilitation at [place] inpatient                      They have attended couple counseling a couple of times and patient went to the family program for chemical dependency.      Patient denies alcohol or drug use and herself                      Has 2 children, girls ages 5 and 8      [employer] [employer] and also works for the [employer]. 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                                 Encounter Medications                     Outpatient Encounter Prescriptions as of 2018   Medication Sig Dispense Refill     venlafaxine (EFFEXOR-ER) 150 MG TB24 24 hr tablet Take 1 tablet (150 mg) by mouth daily (with breakfast) 30 each 0     order for DME Equipment being ordered: 20-30mmHg compression sleeve and 20-30mmHg compression glove\" x2 pair 1 Units 0     rOPINIRole (REQUIP) 0.25 MG tablet Take 1 tablet (0.25 mg) by mouth At Bedtime 30 tablet 11     cephALEXin (KEFLEX) 500 MG capsule Take 2 capsules (1,000 mg) by mouth 2 times daily 24 capsule 0     oxyCODONE IR (ROXICODONE) 5 MG tablet Take 1-3 tablets (5-15 mg) by mouth every 4 hours as needed for pain maximum 12 tablet(s) per day 40 tablet 0     aspirin 81 MG EC tablet Take 81 mg by mouth daily           acetaminophen (TYLENOL) 325 MG tablet Take 2 tablets (650 mg) by mouth every 4 hours as needed for other (mild pain) 100 tablet " 0     senna-docusate (SENOKOT-S;PERICOLACE) 8.6-50 MG per tablet Take 1 tablet by mouth 2 times daily as needed for constipation 60 tablet 1     LORazepam (ATIVAN) 1 MG tablet Take 1 tablet (1 mg) by mouth every 8 hours as needed for anxiety 30 tablet 0     Cholecalciferol (VITAMIN D3) 3000 UNITS TABS Take 3,000 Int'l Units by mouth daily 30 tablet 3     zonisamide (ZONEGRAN) 25 MG capsule Take 1 tablet (25 mg) once daily for 1 week, then 2 tablets once daily for 1 week, then 3 tablets once daily for 1 week, then 4 tablets once daily for 1 week. 90 capsule 1                                 Facility-Administered Encounter Medications as of 1/9/2018   Medication Dose Route Frequency Provider Last Rate Last Dose     lidocaine 1 % 9 mL  9 mL Intradermal Once Anna Naidu MD           sodium bicarbonate 8.4 % injection 1 mEq  1 mEq Intradermal Once Anna Naidu MD                                Review Of Systems  Skin: As above  Eyes: negative  Ears/Nose/Throat: negative  Respiratory: No shortness of breath, dyspnea on exertion, cough, or hemoptysis  Cardiovascular: negative  Gastrointestinal: negative  Genitourinary: negative  Musculoskeletal: negative  Neurologic: negative  Psychiatric: negative  Hematologic/Lymphatic/Immunologic: negative  Endocrine: negative          O:                                   NAD, WDWN, Alert & Oriented, Mood & Affect wnl, Vitals stable                                         Here today alone                                         /74 (BP Location: Left arm, Patient Position: Sitting, Cuff Size: Adult Large)  Pulse 79  Temp 98.7  F (37.1  C) (Tympanic)                                         General appearance normal                                         Vitals stable                                         Alert, oriented and in no acute distress                                             Following lymph nodes palpated: Occipital, Cervical, Supraclavicular , axilla,  inguinal, femoral no lad                                         Stuck on papules and brown macules on trunk and ext                                          Pink papules on trunk                           Red papules on trunk         eczemaotus patches medial thighs       The remainder of the full exam was unremarkable; the following areas were examined:  conjunctiva/lids, oral mucosa, neck, peripheral vascular system, abdomen, lymph nodes, digits/nails, eccrine and apocrine glands, scalp/hair, face, neck, chest, abdomen, buttocks, back, RUE, LUE, RLE, LLE                                                         Eyes: Conjunctivae/lids:Normal                                                     ENT: Lips, buccal mucosa, tongue: normal                                                    MSK:Normal                                                    Cardiovascular: peripheral edema none                                                    Pulm: Breathing Normal                                                    Lymph Nodes: No Head and Neck Lymphadenopathy                                                     Neuro/Psych: Orientation:Normal; Mood/Affect:Normal          A/P:  1. Metastatic melanoma, seborrheic keratosis, lentigo, dermal nevi, angioma, hx of basal cell carcinoma      2. Thighs irritatant derm  Lidex prn  Body glide daily    MELANOMA DISCUSSED WITH PATIENT:  I discussed the specifics of tumor, prognosis, metachronous melanoma, self exam, and genetics with the patient. I explained the need for monthly skin exams including and taught the patient how to do this. Patient was asked about new or changing moles and a full skin exam was performed.   BENIGN LESIONS DISCUSSED WITH PATIENT:  I discussed the specifics of tumor, prognosis, and genetics of benign lesions.  I explained that treatment of these lesions would be purely cosmetic and not medically neccessary.  I discussed with patient different removal options  including excision, cautery and /or laser.        Nature and genetics of benign skin lesions dicussed with patient.  Signs and Symptoms of skin cancer discussed with patient.  ABCDEs of melanoma reviewed with patient.  Patient encouraged to perform monthly skin exams.  UV precautions reviewed with patient.  Skin care regimen reviewed with patient: Eliminate harsh soaps, i.e. Dial, zest, irsih spring; Mild soaps such as Cetaphil or Dove sensitive skin, avoid hot or cold showers, aggressive use of emollients including vanicream, cetaphil or cerave discussed with patient.    Risks of non-melanoma skin cancer discussed with patient   Return to clinic 3 months      Again, thank you for allowing me to participate in the care of your patient.        Sincerely,        Lowell Bullock MD

## 2019-03-05 ENCOUNTER — APPOINTMENT (OUTPATIENT)
Dept: GENERAL RADIOLOGY | Facility: CLINIC | Age: 44
End: 2019-03-05
Attending: EMERGENCY MEDICINE
Payer: COMMERCIAL

## 2019-03-05 ENCOUNTER — HOSPITAL ENCOUNTER (EMERGENCY)
Facility: CLINIC | Age: 44
Discharge: HOME | End: 2019-03-05
Attending: EMERGENCY MEDICINE | Admitting: EMERGENCY MEDICINE
Payer: COMMERCIAL

## 2019-03-05 ENCOUNTER — NURSE TRIAGE (OUTPATIENT)
Dept: NURSING | Facility: CLINIC | Age: 44
End: 2019-03-05

## 2019-03-05 VITALS
RESPIRATION RATE: 18 BRPM | OXYGEN SATURATION: 98 % | BODY MASS INDEX: 40.74 KG/M2 | WEIGHT: 230 LBS | DIASTOLIC BLOOD PRESSURE: 86 MMHG | TEMPERATURE: 98 F | SYSTOLIC BLOOD PRESSURE: 136 MMHG

## 2019-03-05 DIAGNOSIS — M79.672 LEFT FOOT PAIN: ICD-10-CM

## 2019-03-05 DIAGNOSIS — M25.572 PAIN IN JOINT, ANKLE AND FOOT, LEFT: ICD-10-CM

## 2019-03-05 PROCEDURE — 73610 X-RAY EXAM OF ANKLE: CPT | Mod: LT

## 2019-03-05 PROCEDURE — 73630 X-RAY EXAM OF FOOT: CPT | Mod: LT

## 2019-03-05 PROCEDURE — 25000128 H RX IP 250 OP 636: Performed by: EMERGENCY MEDICINE

## 2019-03-05 PROCEDURE — 96372 THER/PROPH/DIAG INJ SC/IM: CPT | Performed by: EMERGENCY MEDICINE

## 2019-03-05 PROCEDURE — 99284 EMERGENCY DEPT VISIT MOD MDM: CPT | Mod: Z6 | Performed by: EMERGENCY MEDICINE

## 2019-03-05 PROCEDURE — 99284 EMERGENCY DEPT VISIT MOD MDM: CPT | Performed by: EMERGENCY MEDICINE

## 2019-03-05 RX ORDER — KETOROLAC TROMETHAMINE 30 MG/ML
30 INJECTION, SOLUTION INTRAMUSCULAR; INTRAVENOUS ONCE
Status: COMPLETED | OUTPATIENT
Start: 2019-03-05 | End: 2019-03-05

## 2019-03-05 RX ADMIN — KETOROLAC TROMETHAMINE 30 MG: 30 INJECTION, SOLUTION INTRAMUSCULAR at 09:48

## 2019-03-05 ASSESSMENT — ENCOUNTER SYMPTOMS
SHORTNESS OF BREATH: 0
WOUND: 0
FEVER: 0
ABDOMINAL PAIN: 0

## 2019-03-05 NOTE — TELEPHONE ENCOUNTER
Doretha sprained ankle last month and fell yesterday and feels that it is broken. Today Doretha cannot bear weight on left ankle.       Reason for Disposition    Entire foot is cool or blue in comparison to other side    Protocols used: ANKLE PAIN-ADULT-AH

## 2019-03-05 NOTE — ED NOTES
"Patient ambulated in.  Patient states last night fell on ice, slipped and twisted left ankle. Has a recent sprain 3 weeks ago, same ankle. Unable to bear full weight, iced all night. Is on lyrica \"can still feel the pain.\" took tylenol this a.m. CMS intact. Warm and dry.   "

## 2019-03-05 NOTE — DISCHARGE INSTRUCTIONS
Use gel splint.  Keep leg elevated, and iced when at home, and resting.  Continue home medications.  Tylenol as needed for pain.  Follow-up with primary care provider if not improved over the next 1 week.

## 2019-03-05 NOTE — ED PROVIDER NOTES
"  History     Chief Complaint   Patient presents with     Foot Pain     \" twisted left foot \" on ice yesterday fell pt ambulated in     HPI  Doretha Fernandez is a 44 year old female who has past medical history significant for chronic pain, anxiety, prothrombin deficiency, asthma, history of previous TIA, currently taking prednisone, and Lyrica, who presents to the emergency department with concerns regarding left foot pain, and additional left ankle pain.  Patient states that she fell a few weeks ago, twisting her ankle, and subsequently followed up in clinic 1 week after that.  No x-ray images were performed.  However, patient states that she was walking yesterday evening, using heels on ice, and subsequently inverted her left foot/ankle, causing pain in the left foot.  Has had pain with ambulation, moderate in severity, with no alleviating factors.  States that she is unable to take ibuprofen secondary to gene mutation.  Has been taking Tylenol without significant relief.  She has also been placing ice.  No previous left ankle or foot surgeries.  Denies any numbness, or tingling.    Allergies:  Allergies   Allergen Reactions     Compazine [Prochlorperazine] Fatigue     Fentanyl Other (See Comments)     sweating     Erythrocin Nausea and Vomiting     Zithromax [Azithromycin Dihydrate] Diarrhea       Problem List:    Patient Active Problem List    Diagnosis Date Noted     Influenza-like illness 05/06/2018     Priority: Medium     Other chronic pain 05/06/2018     Priority: Medium     Rash and nonspecific skin eruption 04/05/2018     Priority: Medium     Bilateral leg cramps 03/07/2018     Priority: Medium     Intestinal giardiasis 03/05/2018     Priority: Medium     Rectal bleeding 03/04/2018     Priority: Medium     Diarrhea 03/04/2018     Priority: Medium     Colitis 03/01/2018     Priority: Medium     Functional diarrhea 02/22/2018     Priority: Medium     Melanoma (H) 10/23/2017     Priority: Medium     Malignant " melanoma of left upper extremity including shoulder (H) 10/12/2017     Priority: Medium     Metastatic malignant melanoma (H) 10/10/2017     Priority: Medium     Prothrombin mutation (H) 2017     Priority: Medium     On daily aspirin 81 mg per hematology's recommendations from        Vision changes 2017     Priority: Medium     Mild intermittent asthma without complication 2013     Priority: Medium     Mild major depression (H) 2013     Priority: Medium     Anxiety state 2012     Priority: Medium     Problem list name updated by automated process. Provider to review       Esophageal reflux 2012     Priority: Medium     DUB (dysfunctional uterine bleeding) 2011     Priority: Medium     CARDIOVASCULAR SCREENING; LDL GOAL LESS THAN 160 2011     Priority: Low        Past Medical History:    Past Medical History:   Diagnosis Date     Abnormal MRI      Anxiety      Basal cell carcinoma      Depression      Lumbago      Malignant melanoma (H)      Mild persistent asthma      Prothrombin deficiency (H)      Stroke (cerebrum) (H)      TIA (transient ischemic attack)        Past Surgical History:    Past Surgical History:   Procedure Laterality Date     COLONOSCOPY N/A 10/18/2017    Procedure: COLONOSCOPY;  Colon;  Surgeon: Debbie Stephens MD;  Location: [facility]     COLONOSCOPY N/A 3/9/2018    Procedure: COMBINED COLONOSCOPY, SINGLE OR MULTIPLE BIOPSY/POLYPECTOMY BY BIOPSY;  colon  ;  Surgeon: Benita Schumacher MD;  Location: [facility] GI     DISSECT LYMPH NODE AXILLA Left 10/23/2017    Procedure: DISSECT LYMPH NODE AXILLA;  Left Axillary Lymph Node Dissection ;  Surgeon: Laurent Cool MD;  Location: [facility] OR     GYN SURGERY           REPAIR MOHS Left 2017    Procedure: REPAIR MOHS;  Left Upper Lid Moh's Reconstruction;  Surgeon: Kisha Bosch MD;  Location: [facility] OR       Family History:    Family History   Problem Relation Age of Onset     Cancer Mother 45         lung     Neurologic Disorder Mother      Lipids Father      Gastrointestinal Disease Father      Depression Father      Cancer Maternal Grandmother      Blood Disease Maternal Grandmother      Arthritis Maternal Grandmother      Diabetes Maternal Grandmother      Depression Maternal Grandmother      Macular Degeneration Maternal Grandmother      Glaucoma Maternal Grandmother      Diabetes Maternal Grandfather      Cerebrovascular Disease Maternal Grandfather      Blood Disease Maternal Grandfather      Heart Disease Maternal Grandfather      Glaucoma Maternal Grandfather      Cancer Paternal Grandmother      Cancer - colorectal Paternal Grandmother      Respiratory Paternal Grandfather      Blood Disease Paternal Grandfather      Heart Disease Daughter      Asthma Daughter      Depression Sister        Social History:  Marital Status:   [4]  Social History     Tobacco Use     Smoking status: Passive Smoke Exposure - Never Smoker     Smokeless tobacco: Never Used   Substance Use Topics     Alcohol use: No     Comment: occ     Drug use: No        Medications:      Acetaminophen (TYLENOL PO)   Calcium Carbonate (CALCIUM-CARB 600 PO)   Cholecalciferol (VITAMIN D3) 3000 units TABS   fluocinonide (LIDEX) 0.05 % external cream   ondansetron (ZOFRAN) 8 MG tablet   order for DME   order for DME   predniSONE (DELTASONE) 20 MG tablet   pregabalin (LYRICA) 25 MG capsule   pregabalin (LYRICA) 75 MG capsule   promethazine (PHENERGAN) 25 MG tablet   rOPINIRole (REQUIP) 0.25 MG tablet   sulfamethoxazole-trimethoprim (BACTRIM DS/SEPTRA DS) 800-160 MG tablet   venlafaxine (EFFEXOR-ER) 225 MG 24 hr tablet         Review of Systems   Constitutional: Negative for fever.   Respiratory: Negative for shortness of breath.    Cardiovascular: Negative for chest pain.   Gastrointestinal: Negative for abdominal pain.   Musculoskeletal:        Left ankle and foot pain.   Skin: Negative for rash and wound.   All other systems reviewed  and are negative.      Physical Exam   BP: 136/86  Heart Rate: 86  Temp: 98  F (36.7  C)  Resp: 18  Weight: 104.3 kg (230 lb)  SpO2: 98 %      Physical Exam  /86   Temp 98  F (36.7  C)   Resp 18   Wt 104.3 kg (230 lb)   SpO2 98%   BMI 40.74 kg/m    General: alert and in no acute distress  Head: atraumatic, normocephalic  Abd: Soft, nontender, nondistended, no peritoneal signs  Musculoskel/Extremities: Left foot and ankle thoroughly examined.  No significant lower extremity swelling is noted of the shin/calf.  There is perhaps slight amounts of swelling of the medial and lateral malleoli numbness over the lateral and medial aspect.  Also does have tenderness near the base of first  metatarsal.  No significant tenderness of the lateral aspect of the foot.  No tenderness of the digits of the foot.  Normal capillary refill, with normal DP and PT pulses.  Skin: no rashes, no diaphoresis and skin color normal  Neuro: Patient awake, alert, oriented, speech is fluent, gait is normal  Psychiatric: affect/mood normal, cooperative, normal judgement/insight and memory intact      ED Course        Procedures               Critical Care time:  none               Results for orders placed or performed during the hospital encounter of 03/05/19 (from the past 24 hour(s))   Foot XR, G/E 3 views, left    Narrative    LEFT FOOT THREE OR MORE VIEWS  3/5/2019 9:41 AM     HISTORY:  First metatarsal pain.  Twisting foot last night.    COMPARISON: None.      Impression    IMPRESSION: Bones are normally aligned. No acute fracture.   XR Ankle Left G/E 3 Views    Narrative    ANKLE LEFT THREE OR MORE VIEWS   3/5/2019 9:41 AM     HISTORY: Bilateral ankle pain.    COMPARISON: None.      Impression    IMPRESSION: Ankle mortise intact. Lateral soft tissue swelling.  Well-corticated calcifications adjacent to the medial malleolus may be  incidental ossicles or related to prior injury. No definite acute  fracture.       Medications    ketorolac (TORADOL) injection 30 mg (30 mg Intramuscular Given 3/5/19 0948)       Assessments & Plan (with Medical Decision Making)  44 year old female, with past medical history as reviewed above, presenting to the emergency department with concerns regarding left foot, and ankle pain.  Patient slipped on ice yesterday evening, twisting the ankle/foot.  Had immediate pain.  Has had ongoing pain.  Concern is for possible fracture versus strain/sprain.  X-ray images performed, reviewed by myself in addition to radiology showed no evidence of acute fracture.  Patient will be given gel splint.  She was offered crutches, however declined.  1 dose of Toradol intramuscular injection is given.  Patient encouraged follow-up with primary care provider if not improved.  Weightbearing as tolerated.     I have reviewed the nursing notes.    I have reviewed the findings, diagnosis, plan and need for follow up with the patient.   \       Medication List      There are no discharge medications for this visit.         Final diagnoses:   Left foot pain   Pain in joint, ankle and foot, left       3/5/2019   [facility] EMERGENCY DEPARTMENT     Anthony Murillo MD  03/05/19 1011

## 2019-03-05 NOTE — ED AVS SNAPSHOT
[facility] Emergency Department  [address] [address]  Phone:  [phone]  Fax:  [phone]                                    Doretha Fernandez   MRN: [id number]    Department:  [facility] Emergency Department   Date of Visit:  3/5/2019           After Visit Summary Signature Page    I have received my discharge instructions, and my questions have been answered. I have discussed any challenges I see with this plan with the nurse or doctor.    ..........................................................................................................................................  Patient/Patient Representative Signature      ..........................................................................................................................................  Patient Representative Print Name and Relationship to Patient    ..................................................               ................................................  Date                                   Time    ..........................................................................................................................................  Reviewed by Signature/Title    ...................................................              ..............................................  Date                                               Time          22EPIC Rev 08/18

## 2019-03-18 DIAGNOSIS — M19.90 INFLAMMATORY ARTHRITIS: ICD-10-CM

## 2019-03-18 RX ORDER — PREGABALIN 75 MG/1
75 CAPSULE ORAL 2 TIMES DAILY
Qty: 60 CAPSULE | Refills: 1 | Status: SHIPPED | OUTPATIENT
Start: 2019-03-18 | End: 2019-05-16

## 2019-03-18 NOTE — TELEPHONE ENCOUNTER
pregabalin (LYRICA) 75 MG capsule      Last Written Prescription Date:  1/21/19  Last Fill Quantity: 60,   # refills: 1  Last Office Visit: 1/28/19 pascual Naidu  Future Office visit:    Next 5 appointments (look out 90 days)    May 28, 2019 11:45 AM CDT  [facility] Visit with Lowell Bullock MD  [facility] [address] [address]  [phone]           Routing refill request to provider for review/approval because:  Drug not on the [facility], [facility] or [facility] refill protocol or controlled substance

## 2019-03-19 ENCOUNTER — TRANSFERRED RECORDS (OUTPATIENT)
Dept: HEALTH INFORMATION MANAGEMENT | Facility: CLINIC | Age: 44
End: 2019-03-19

## 2019-03-21 ENCOUNTER — TRANSFERRED RECORDS (OUTPATIENT)
Dept: HEALTH INFORMATION MANAGEMENT | Facility: CLINIC | Age: 44
End: 2019-03-21

## 2019-03-21 ENCOUNTER — TELEPHONE (OUTPATIENT)
Dept: ONCOLOGY | Facility: CLINIC | Age: 44
End: 2019-03-21

## 2019-03-21 ENCOUNTER — MEDICAL CORRESPONDENCE (OUTPATIENT)
Dept: ONCOLOGY | Facility: CLINIC | Age: 44
End: 2019-03-21

## 2019-03-21 NOTE — TELEPHONE ENCOUNTER
Rec'd records from [facility] today via fax. Visit date 3-21-19, Records given to Karen VELIZ RN.

## 2019-04-10 ENCOUNTER — TRANSFERRED RECORDS (OUTPATIENT)
Dept: HEALTH INFORMATION MANAGEMENT | Facility: CLINIC | Age: 44
End: 2019-04-10

## 2019-04-29 ENCOUNTER — TELEPHONE (OUTPATIENT)
Dept: FAMILY MEDICINE | Facility: CLINIC | Age: 44
End: 2019-04-29

## 2019-04-29 DIAGNOSIS — R22.32 MASS OF LEFT AXILLA: ICD-10-CM

## 2019-04-29 DIAGNOSIS — C43.62 MALIGNANT MELANOMA OF LEFT UPPER EXTREMITY INCLUDING SHOULDER (H): ICD-10-CM

## 2019-04-29 DIAGNOSIS — C44.91 NODULAR BASAL CELL CARCINOMA: ICD-10-CM

## 2019-04-29 DIAGNOSIS — R59.9 LYMPH NODE ENLARGEMENT: ICD-10-CM

## 2019-04-29 DIAGNOSIS — C43.9 MALIGNANT MELANOMA, UNSPECIFIED SITE (H): ICD-10-CM

## 2019-04-29 DIAGNOSIS — R22.1 NECK SWELLING: ICD-10-CM

## 2019-04-29 DIAGNOSIS — C43.9 METASTATIC MALIGNANT MELANOMA (H): Primary | ICD-10-CM

## 2019-04-29 NOTE — TELEPHONE ENCOUNTER
Lymphedema Referral placed,signed and faxed to Rehab @ [phone].  Pt given # to schedule appt.  [email or web address]

## 2019-04-29 NOTE — TELEPHONE ENCOUNTER
Reason for Call: Request for an order or referral:    Order or referral being requested: Pt calling to ask that Dr. Naidu place an order for lymphedema clinic, due to her arm swelling.  Please call patient and advise.      Date needed: as soon as possible    Has the patient been seen by the PCP for this problem? YES    Additional comments:     Phone number Patient can be reached at:  Work number on file:  [phone] (work)    Best Time:  any    Can we leave a detailed message on this number?  YES    Call taken on 4/29/2019 at 9:35 AM by Jena Quintero

## 2019-04-30 ENCOUNTER — TELEPHONE (OUTPATIENT)
Dept: FAMILY MEDICINE | Facility: CLINIC | Age: 44
End: 2019-04-30

## 2019-04-30 DIAGNOSIS — C44.91 NODULAR BASAL CELL CARCINOMA: ICD-10-CM

## 2019-04-30 DIAGNOSIS — C43.62 MALIGNANT MELANOMA OF LEFT UPPER EXTREMITY INCLUDING SHOULDER (H): ICD-10-CM

## 2019-04-30 DIAGNOSIS — R22.1 NECK SWELLING: ICD-10-CM

## 2019-04-30 DIAGNOSIS — C43.9 METASTATIC MALIGNANT MELANOMA (H): Primary | ICD-10-CM

## 2019-04-30 DIAGNOSIS — C43.9 MALIGNANT MELANOMA, UNSPECIFIED SITE (H): ICD-10-CM

## 2019-04-30 DIAGNOSIS — R59.9 LYMPH NODE ENLARGEMENT: ICD-10-CM

## 2019-04-30 NOTE — TELEPHONE ENCOUNTER
Reason for Call: Request for an order or referral:    Order or referral being requested: DME order compression shirt and compression sleeve and glove please see DME from 11/28/17 and 1/11/18    Date needed: as soon as possible    Has the patient been seen by the PCP for this problem? Not Applicable    Additional comments: pt is going out of town Thursday AM and would like these before she leaves    Phone number Patient can be reached at:  Home number on file [phone] (home)    Best Time:  any    Can we leave a detailed message on this number?  YES    Call taken on 4/30/2019 at 1:45 PM by Isaura Ramsey

## 2019-04-30 NOTE — TELEPHONE ENCOUNTER
MEENA for pt to call clinic/RN to clarify request.  Figured it out.  DME orders are @ the  for .  [email or web address]

## 2019-05-02 NOTE — TELEPHONE ENCOUNTER
Natalya from [facility] is calling and stating that we need to add a code of I 89.0 diagnosis Lymphadema. Could we add this diagnosis to the previous orders and re fax. [phone].  Elisa Zapata  [facility]    .

## 2019-05-08 ENCOUNTER — HOSPITAL ENCOUNTER (OUTPATIENT)
Dept: PHYSICAL THERAPY | Facility: CLINIC | Age: 44
Setting detail: THERAPIES SERIES
End: 2019-05-08
Attending: FAMILY MEDICINE
Payer: COMMERCIAL

## 2019-05-08 PROCEDURE — 97161 PT EVAL LOW COMPLEX 20 MIN: CPT | Mod: GP | Performed by: PHYSICAL THERAPIST

## 2019-05-08 PROCEDURE — 97140 MANUAL THERAPY 1/> REGIONS: CPT | Mod: GP | Performed by: PHYSICAL THERAPIST

## 2019-05-08 NOTE — PROGRESS NOTES
"Outpatient Lymphedema Therapy Evaluation       05/08/19 0900   Rehab Discipline   Discipline PT   Type of Visit   Type of visit Initial Edema Evaluation       present No   General Information   Start of care 05/08/19   Referring physician Dr. Anna Naidu MD   Orders Evaluate and treat as indicated   Order date 04/25/19   Medical diagnosis LUE, L-breast and distal L-axilla lymphedema   Edema onset   (4/25/19 - date of referral)   Affected body parts LUE  (L-breast, L-distal axilla and L-upper back)   Edema etiology Cancer with lymph node dissection;Surgery;Chemo   Location - Cancer with lymph node dissection L-axilla; 1/20+   Chemotherapy comments current immunotherapy   Edema etiology comments s/p radical L-axillary LN dissection (1/20 positive) for metastatic melanoma on 10/23/17 with drain removal on 11/13/17; pt was admitted to the hospital from 11/14 - 11/18 due to sepsis 2/2 post-op wound infection in L-axilla (cellulitis)    Pertinent history of current problem (PT: include personal factors and/or comorbidities that impact the POC; OT: include additional occupational profile info) pt initially seen at this OP lymphedema clinic November 2017 and last seen ~1 year ago (6/19/18); at the time was seen for LUE and L-breast lymphedema as well as cancer rehab; last week pt ordered a new compression bra and Lev pad, compression sleeve and glove (Jobst Renetta Strong 20-30mmHg) from [facility] [place]note 2[place] in [place]; has been on chronic prednisone for over a year, still trying to taper down and figured out pt has RA now; has gained 60# since last June and reports LUE/LUQ lympehdema is now out of hand; ongoing shoulder pain due to heaviness from L-breast, LUE and L-upper back lymphedema; pt reports having gone form a 40D to a 44EE which still isn't big enough for the L-breast which is \"spilling out of the sides\"   Surgical / medical history reviewed Yes   Edema special tests   (denies history of DVTs) "   Prior level of functional mobility independnet with mobility   Prior treatment Complete decongestive therapy;Compression garments;Exercise;Elevation;MLD;Gradient compression bandaging   Patient role / employment history Employed  (at [employer] uses arm all day long; pt is R-handed)   Living environment [facility]   Living environment comments two teenage daughters   Assistive device comments no   Fall Risk Screen   Fall screen completed by PT   Have you fallen 2 or more times in the past year? No   Have you fallen and had an injury in the past year? No   Is patient a fall risk? No   Abuse Screen (yes response referral indicated)   Feels Unsafe at Home or Work/School no   Feels Threatened by Someone no   Does Anyone Try to Keep You From Having Contact with Others or Doing Things Outside Your Home? no   Physical Signs of Abuse Present no   System Outcome Measures   Outcome Measures Lymphedema   FACIT Fatigue Subscale (score out of 52). The higher the score, the better the QOL. 42   Subjective Report   Patient report of symptoms arm hurts; heavy, full and tight   Precautions   Precautions comments no known precautions; cancer free with scans every 3 months and skin checks every 2 months   Patient / Family Goals   Patient / family goals statement to reduce the swelling and symptoms   Pain   Patient currently in pain No   Pain location generalized pain with arm 6/10 and upper back/shoulder blade 8/10   Vitals Signs   Heart Rate 87   SpO2 97   Cognitive Status   Orientation Orientation to person, place and time   Level of consciousness Alert   Follows commands and answers questions 100% of the time   Personal safety and judgement Intact   Memory Intact   Edema Exam / Assessment   Skin condition Non-pitting;Intact   Skin condition comments LUQ skin all intact including LUE, L-breast and L-upper back that is all very full and firm from fingers throughout entire arm, entire L-breast, under L-axilla and much firmer in  L-upper back vs R-upper back   Scar Yes   Location just distal to L-axilla   Mobility good   Capillary refill Symmetrical   Dorsal pedal pulse Symmetrical   Stemmer sign Negative   Ulceration No   Girth Measurements   Girth Measurements Refer to separate girth measurement flowsheet   Volume UE   Left UE (mL) 2777.28   % difference +24% since last measured on 6/19/19   Range of Motion   ROM comments LUE mostly functional but edema itself and heaviness limit ability and comfort and duration of overhead reaching    Strength   Strength comments LUE functional   Posture   Posture Forward head position   Palpation   Palpation denies hypersensitivies    Sensory   Sensory perception Light touch   Light touch Impaired   Sensory perception comments decreased sensation to LUE particularly at tripcep area   Vascular Assessment   Vascular Assessment Comments no known concerns   Coordination   Coordination comments gr   Muscle Tone   Muscle tone comments no   Planned Edema Interventions   Planned edema interventions Manual lymph drainage;Gradient compression bandaging;Fit for compression garment;Exercises;Precautions to prevent infection / exacerbation;Education;Manual therapy;Skin care / precautions;Myofascial release;Home management program development   Clinical Impression   Criteria for skilled therapeutic intervention met Yes   Therapy diagnosis LUE, L-breast, LUQ lymphedema   Influenced by the following impairments / conditions Stage 2   Functional limitations due to impairments / conditions great difficulty in performing overhead and behind the back activities from edema and heaviness; difficulty at work using LUE   Clinical Presentation Evolving/Changing   Clinical Presentation Rationale clinical judgement; 24% increased in edema since June 2018 and 4 inch increase in breast circumference since June 2018   Clinical Decision Making (Complexity) Low complexity   Treatment frequency 3 times / week   Treatment duration 8  weeks   Patient / family and/or staff in agreement with plan of care Yes   Risks and benefits of therapy have been explained Yes   Education Assessment   Preferred learning style Listening   Barriers to learning No barriers   Goals   Edema Eval Goals 1;2;3   Goal 1   Goal identifier stg   Goal description pt to have around the clock tolerance to LUE GCB for lymphedema reduction response   Target date 05/22/19   Goal 2   Goal identifier stg   Goal description pt to have in-home set-up and education completed for Flexitouch compression pump and use daily for decreased lymphedema and associated symptoms   Target date 05/22/19   Goal 3   Goal identifier ltg   Goal description once appropriate, pt to be independent with donning, dofing and care of compression sleeve and glove for longterm lymphedema mangement for maintenance   Target date 07/07/19   Goal 4   Goal identifier ltg   Goal description pt to be independent with managing LUE/LUQ lymphedema longterm via HEP, elevation, skin cares, MLD/pump use and compression garment use/wear   Target date 07/07/19   Goal 5   Goal identifier ltg   Goal description pt to have at least 8 point improvement on LLIS due to decreased lymphedema in LUE/LUQ and decreased associated symptoms    Target date 07/07/19   Total Evaluation Time   PT Eval, Low Complexity Minutes (75939) 10

## 2019-05-10 ENCOUNTER — HOSPITAL ENCOUNTER (OUTPATIENT)
Dept: PHYSICAL THERAPY | Facility: CLINIC | Age: 44
Setting detail: THERAPIES SERIES
End: 2019-05-10
Attending: FAMILY MEDICINE
Payer: COMMERCIAL

## 2019-05-10 PROCEDURE — 97140 MANUAL THERAPY 1/> REGIONS: CPT | Mod: GP | Performed by: REHABILITATION PRACTITIONER

## 2019-05-13 ENCOUNTER — HOSPITAL ENCOUNTER (OUTPATIENT)
Dept: PHYSICAL THERAPY | Facility: CLINIC | Age: 44
Setting detail: THERAPIES SERIES
End: 2019-05-13
Attending: FAMILY MEDICINE
Payer: COMMERCIAL

## 2019-05-13 PROCEDURE — 97140 MANUAL THERAPY 1/> REGIONS: CPT | Mod: GP | Performed by: REHABILITATION PRACTITIONER

## 2019-05-14 ENCOUNTER — OFFICE VISIT (OUTPATIENT)
Dept: DERMATOLOGY | Facility: CLINIC | Age: 44
End: 2019-05-14
Payer: COMMERCIAL

## 2019-05-14 VITALS — SYSTOLIC BLOOD PRESSURE: 129 MMHG | OXYGEN SATURATION: 97 % | DIASTOLIC BLOOD PRESSURE: 73 MMHG | HEART RATE: 99 BPM

## 2019-05-14 DIAGNOSIS — L81.4 LENTIGO: ICD-10-CM

## 2019-05-14 DIAGNOSIS — D18.00 ANGIOMA: ICD-10-CM

## 2019-05-14 DIAGNOSIS — C43.9 METASTATIC MELANOMA (H): Primary | ICD-10-CM

## 2019-05-14 DIAGNOSIS — D23.9 DERMAL NEVUS: ICD-10-CM

## 2019-05-14 DIAGNOSIS — Z85.828 HISTORY OF BASAL CELL CANCER: ICD-10-CM

## 2019-05-14 DIAGNOSIS — L82.1 SEBORRHEIC KERATOSIS: ICD-10-CM

## 2019-05-14 DIAGNOSIS — M19.90 INFLAMMATORY ARTHRITIS: ICD-10-CM

## 2019-05-14 PROCEDURE — 99213 OFFICE O/P EST LOW 20 MIN: CPT | Performed by: DERMATOLOGY

## 2019-05-14 NOTE — NURSING NOTE
"Initial /73   Pulse 99   SpO2 97%  Estimated body mass index is 40.74 kg/m  as calculated from the following:    Height as of 1/28/19: 1.6 m (5' 3\").    Weight as of 3/5/19: 104.3 kg (230 lb). .      "

## 2019-05-14 NOTE — LETTER
2019         RE: Doretha Fernandez  [address]  [place] [address]        Dear Colleague,    Thank you for referring your patient, Doretha Fernandez, to the [facility]. Please see a copy of my visit note below.    Doretha Fernandez is a 44 year old year old female patient here today for f/u h xof melanoma unknown primary.  She went to Dr. Cool for node dissection.1 out of 20 nodes positive.  No adjuvant Radiation given no survvial advantage.   MRI of brain and PET scan both clear.  Was on  Nivolumab got 8 injection but got colitis.  She went to [place].   She was treated with high dose steroids for colitis and had a dx of bacterial pneumonia.   She was admitted for to hospital for this. She is followed by GI and is on pred taper.  She was started on Entyvio.  Scans have been clear.  She is off of melanoma meds.  She was also dx with seronegative rheumatoid arthritis and is to start tocilizumab.  Prednisone has made her gain weight,   Rash is better on legs.  Overall scans are stable.   She denies any new or changing skin lesions.  Patient reports the following modifying factors none.  Associated symptoms: none.  Patient has no other skin complaints today.  Remainder of the HPI, Meds, PMH, Allergies, FH, and SH was reviewed in chart.  Current PET-CT scan, physical exam and laboratory tests demonstrate no evidence of melanoma recurrence.       Pertinent Hx:                Melanoma metastatic           Past Medical History                Past Medical History:   Diagnosis Date     Abnormal MRI         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      Anxiety        Depression        Lumbago         left lower back pain     Malignant melanoma (H)        Mild persistent asthma        Prothrombin deficiency (H)         takes 81mg asa daily     Stroke (cerebrum) (H)       During      TIA (transient ischemic attack)                         Past Surgical History                    Past Surgical History:   Procedure Laterality Date     COLONOSCOPY N/A 10/18/2017      Procedure: COLONOSCOPY;  Colon;  Surgeon: Debbie Stephens MD;  [facility]     DISSECT LYMPH NODE AXILLA Left 10/23/2017      Procedure: DISSECT LYMPH NODE AXILLA;  Left Axillary Lymph Node Dissection ;  Surgeon: Laurent Cool MD;  [facility] OR     GYN SURGERY                 REPAIR MOHS Left 2017      Procedure: REPAIR MOHS;  Left Upper Lid Moh's Reconstruction;  Surgeon: Kisha Bosch MD;  [facility]                        Family History                      Family History   Problem Relation Age of Onset     CANCER Mother 45         lung     Neurologic Disorder Mother        Lipids Father        GASTROINTESTINAL DISEASE Father        Depression Father        CANCER Maternal Grandmother        Blood Disease Maternal Grandmother        Arthritis Maternal Grandmother        DIABETES Maternal Grandmother        Depression Maternal Grandmother        Macular Degeneration Maternal Grandmother        Glaucoma Maternal Grandmother        DIABETES Maternal Grandfather        CEREBROVASCULAR DISEASE Maternal Grandfather        Blood Disease Maternal Grandfather        HEART DISEASE Maternal Grandfather        Glaucoma Maternal Grandfather        CANCER Paternal Grandmother        Cancer - colorectal Paternal Grandmother        Respiratory Paternal Grandfather        Blood Disease Paternal Grandfather        HEART DISEASE Daughter        Asthma Daughter        Depression Sister                           Social History     Social History                     Social History     Marital status:          Spouse name: N/A     Number of children: N/A     Years of education: N/A               Occupational History     Not on file.                        Social History Main Topics     Smoking status: Passive Smoke Exposure - Never Smoker         Last attempt to quit: 3/20/1998     Smokeless tobacco: Never  "Used     Alcohol use No            Comment: occ     Drug use: No     Sexual activity: Yes         Partners: Male         Birth control/ protection: Surgical                  Other Topics Concern     Parent/Sibling W/ Cabg, Mi Or Angioplasty Before 65f 55m? No               Social History Narrative      20 y.o- patient's mother   of lung cancer. She had to take care of her younger sister.      2012- patient's  had a heart attack with stents placed, followed by cardiac rehabilitation      2000 TO 2012  was in [facility] for depression      2013 patient's  went through alcohol rehabilitation at [facility] inpatient                      They have attended couple counseling a couple of times and patient went to the family program for chemical dependency.      Patient denies alcohol or drug use and herself                      Has 2 children, girls ages 5 and 8      [place] [employer] and also works for the [employer]. 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                                      Encounter Medications                      Outpatient Encounter Prescriptions as of 2018   Medication Sig Dispense Refill     venlafaxine (EFFEXOR-ER) 150 MG TB24 24 hr tablet Take 1 tablet (150 mg) by mouth daily (with breakfast) 30 each 0     order for DME Equipment being ordered: 20-30mmHg compression sleeve and 20-30mmHg compression glove\" x2 pair 1 Units 0     rOPINIRole (REQUIP) 0.25 MG tablet Take 1 tablet (0.25 mg) by mouth At Bedtime 30 tablet 11     cephALEXin (KEFLEX) 500 MG capsule Take 2 capsules (1,000 mg) by mouth 2 times daily 24 capsule 0     oxyCODONE IR (ROXICODONE) 5 MG tablet Take 1-3 tablets (5-15 mg) by mouth every 4 hours as needed for pain maximum 12 tablet(s) per day 40 tablet 0     aspirin 81 MG EC tablet Take 81 mg by mouth daily           acetaminophen (TYLENOL) 325 MG tablet Take 2 tablets (650 mg) by " mouth every 4 hours as needed for other (mild pain) 100 tablet 0     senna-docusate (SENOKOT-S;PERICOLACE) 8.6-50 MG per tablet Take 1 tablet by mouth 2 times daily as needed for constipation 60 tablet 1     LORazepam (ATIVAN) 1 MG tablet Take 1 tablet (1 mg) by mouth every 8 hours as needed for anxiety 30 tablet 0     Cholecalciferol (VITAMIN D3) 3000 UNITS TABS Take 3,000 Int'l Units by mouth daily 30 tablet 3     zonisamide (ZONEGRAN) 25 MG capsule Take 1 tablet (25 mg) once daily for 1 week, then 2 tablets once daily for 1 week, then 3 tablets once daily for 1 week, then 4 tablets once daily for 1 week. 90 capsule 1                                 Facility-Administered Encounter Medications as of 1/9/2018   Medication Dose Route Frequency Provider Last Rate Last Dose     lidocaine 1 % 9 mL  9 mL Intradermal Once Anna Naidu MD           sodium bicarbonate 8.4 % injection 1 mEq  1 mEq Intradermal Once Anna Naidu MD                                Review Of Systems  Skin: As above  Eyes: negative  Ears/Nose/Throat: negative  Respiratory: No shortness of breath, dyspnea on exertion, cough, or hemoptysis  Cardiovascular: negative  Gastrointestinal: negative  Genitourinary: negative  Musculoskeletal: negative  Neurologic: negative  Psychiatric: negative  Hematologic/Lymphatic/Immunologic: negative  Endocrine: negative          O:                                   NAD, WDWN, Alert & Oriented, Mood & Affect wnl, Vitals stable                                         Here today alone                                         /74 (BP Location: Left arm, Patient Position: Sitting, Cuff Size: Adult Large)  Pulse 79  Temp 98.7  F (37.1  C) (Tympanic)                                         General appearance normal                                         Vitals stable                                         Alert, oriented and in no acute distress                                             Following lymph  nodes palpated: Occipital, Cervical, Supraclavicular , axilla, inguinal, femoral no lad                                         Stuck on papules and brown macules on trunk and ext                                          Pink papules on trunk                           Red papules on trunk             The remainder of the full exam was unremarkable; the following areas were examined:  conjunctiva/lids, oral mucosa, neck, peripheral vascular system, abdomen, lymph nodes, digits/nails, eccrine and apocrine glands, scalp/hair, face, neck, chest, abdomen, buttocks, back, RUE, LUE, RLE, LLE                                                         Eyes: Conjunctivae/lids:Normal                                                     ENT: Lips, buccal mucosa, tongue: normal                                                    MSK:Normal                                                    Cardiovascular: peripheral edema none                                                    Pulm: Breathing Normal                                                    Lymph Nodes: No Head and Neck Lymphadenopathy                                                     Neuro/Psych: Orientation:Normal; Mood/Affect:Normal          A/P:  1. Metastatic melanoma, seborrheic keratosis, lentigo, dermal nevi, angioma, hx of basal cell carcinoma         MELANOMA DISCUSSED WITH PATIENT:  I discussed the specifics of tumor, prognosis, metachronous melanoma, self exam, and genetics with the patient. I explained the need for monthly skin exams including and taught the patient how to do this. Patient was asked about new or changing moles and a full skin exam was performed.   BENIGN LESIONS DISCUSSED WITH PATIENT:  I discussed the specifics of tumor, prognosis, and genetics of benign lesions.  I explained that treatment of these lesions would be purely cosmetic and not medically neccessary.  I discussed with patient different removal options including excision, cautery  and /or laser.        Nature and genetics of benign skin lesions dicussed with patient.  Signs and Symptoms of skin cancer discussed with patient.  ABCDEs of melanoma reviewed with patient.  Patient encouraged to perform monthly skin exams.  UV precautions reviewed with patient.  Skin care regimen reviewed with patient: Eliminate harsh soaps, i.e. Dial, zest, irsih spring; Mild soaps such as Cetaphil or Dove sensitive skin, avoid hot or cold showers, aggressive use of emollients including vanicream, cetaphil or cerave discussed with patient.    Risks of non-melanoma skin cancer discussed with patient   Return to clinic 3 months                      Again, thank you for allowing me to participate in the care of your patient.        Sincerely,        Lowell Bullock MD

## 2019-05-14 NOTE — PROGRESS NOTES
Doretha Fernandez is a 44 year old year old female patient here today for f/u h xof melanoma unknown primary.  She went to Dr. Cool for node dissection.1 out of 20 nodes positive.  No adjuvant Radiation given no survvial advantage.   MRI of brain and PET scan both clear.  Was on  Nivolumab got 8 injection but got colitis.  She went to [place].   She was treated with high dose steroids for colitis and had a dx of bacterial pneumonia.   She was admitted for to hospital for this. She is followed by GI and is on pred taper.  She was started on Entyvio.  Scans have been clear.  She is off of melanoma meds.  She was also dx with seronegative rheumatoid arthritis and is to start tocilizumab.  Prednisone has made her gain weight,   Rash is better on legs.  Overall scans are stable.   She denies any new or changing skin lesions.  Patient reports the following modifying factors none.  Associated symptoms: none.  Patient has no other skin complaints today.  Remainder of the HPI, Meds, PMH, Allergies, FH, and SH was reviewed in chart.  Current PET-CT scan, physical exam and laboratory tests demonstrate no evidence of melanoma recurrence.       Pertinent Hx:                Melanoma metastatic           Past Medical History                Past Medical History:   Diagnosis Date     Abnormal MRI         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      Anxiety        Depression        Lumbago         left lower back pain     Malignant melanoma (H)        Mild persistent asthma        Prothrombin deficiency (H)         takes 81mg asa daily     Stroke (cerebrum) (H)       During      TIA (transient ischemic attack)                         Past Surgical History                   Past Surgical History:   Procedure Laterality Date     COLONOSCOPY N/A 10/18/2017      Procedure: COLONOSCOPY;  Colon;  Surgeon: Debbie Stephens MD;  [facility]     DISSECT LYMPH NODE AXILLA Left 10/23/2017      Procedure: DISSECT  LYMPH NODE AXILLA;  Left Axillary Lymph Node Dissection ;  Surgeon: Laurent Cool MD;  [facility]     GYN SURGERY                 REPAIR MOHS Left 2017      Procedure: REPAIR MOHS;  Left Upper Lid Moh's Reconstruction;  Surgeon: Kisha Bosch MD;  [facility]                        Family History                      Family History   Problem Relation Age of Onset     CANCER Mother 45         lung     Neurologic Disorder Mother        Lipids Father        GASTROINTESTINAL DISEASE Father        Depression Father        CANCER Maternal Grandmother        Blood Disease Maternal Grandmother        Arthritis Maternal Grandmother        DIABETES Maternal Grandmother        Depression Maternal Grandmother        Macular Degeneration Maternal Grandmother        Glaucoma Maternal Grandmother        DIABETES Maternal Grandfather        CEREBROVASCULAR DISEASE Maternal Grandfather        Blood Disease Maternal Grandfather        HEART DISEASE Maternal Grandfather        Glaucoma Maternal Grandfather        CANCER Paternal Grandmother        Cancer - colorectal Paternal Grandmother        Respiratory Paternal Grandfather        Blood Disease Paternal Grandfather        HEART DISEASE Daughter        Asthma Daughter        Depression Sister                           Social History     Social History                     Social History     Marital status:          Spouse name: N/A     Number of children: N/A     Years of education: N/A               Occupational History     Not on file.                        Social History Main Topics     Smoking status: Passive Smoke Exposure - Never Smoker         Last attempt to quit: 3/20/1998     Smokeless tobacco: Never Used     Alcohol use No            Comment: occ     Drug use: No     Sexual activity: Yes         Partners: Male         Birth control/ protection: Surgical                  Other Topics Concern     Parent/Sibling W/ Cabg, Mi Or  "Angioplasty Before 65f 55m? No               Social History Narrative      20 y.o- patient's mother   of lung cancer. She had to take care of her younger sister.      2012- patient's  had a heart attack with stents placed, followed by cardiac rehabilitation      2000 TO 2012  was in [facility] for depression      2013 patient's  went through alcohol rehabilitation at [place] [facility]                      They have attended couple counseling a couple of times and patient went to the family program for chemical dependency.      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                                      Encounter Medications                      Outpatient Encounter Prescriptions as of 2018   Medication Sig Dispense Refill     venlafaxine (EFFEXOR-ER) 150 MG TB24 24 hr tablet Take 1 tablet (150 mg) by mouth daily (with breakfast) 30 each 0     order for DME Equipment being ordered: 20-30mmHg compression sleeve and 20-30mmHg compression glove\" x2 pair 1 Units 0     rOPINIRole (REQUIP) 0.25 MG tablet Take 1 tablet (0.25 mg) by mouth At Bedtime 30 tablet 11     cephALEXin (KEFLEX) 500 MG capsule Take 2 capsules (1,000 mg) by mouth 2 times daily 24 capsule 0     oxyCODONE IR (ROXICODONE) 5 MG tablet Take 1-3 tablets (5-15 mg) by mouth every 4 hours as needed for pain maximum 12 tablet(s) per day 40 tablet 0     aspirin 81 MG EC tablet Take 81 mg by mouth daily           acetaminophen (TYLENOL) 325 MG tablet Take 2 tablets (650 mg) by mouth every 4 hours as needed for other (mild pain) 100 tablet 0     senna-docusate (SENOKOT-S;PERICOLACE) 8.6-50 MG per tablet Take 1 tablet by mouth 2 times daily as needed for constipation 60 tablet 1     LORazepam (ATIVAN) 1 MG " tablet Take 1 tablet (1 mg) by mouth every 8 hours as needed for anxiety 30 tablet 0     Cholecalciferol (VITAMIN D3) 3000 UNITS TABS Take 3,000 Int'l Units by mouth daily 30 tablet 3     zonisamide (ZONEGRAN) 25 MG capsule Take 1 tablet (25 mg) once daily for 1 week, then 2 tablets once daily for 1 week, then 3 tablets once daily for 1 week, then 4 tablets once daily for 1 week. 90 capsule 1                                 Facility-Administered Encounter Medications as of 1/9/2018   Medication Dose Route Frequency Provider Last Rate Last Dose     lidocaine 1 % 9 mL  9 mL Intradermal Once Anna Naidu MD           sodium bicarbonate 8.4 % injection 1 mEq  1 mEq Intradermal Once Anna Naidu MD                                Review Of Systems  Skin: As above  Eyes: negative  Ears/Nose/Throat: negative  Respiratory: No shortness of breath, dyspnea on exertion, cough, or hemoptysis  Cardiovascular: negative  Gastrointestinal: negative  Genitourinary: negative  Musculoskeletal: negative  Neurologic: negative  Psychiatric: negative  Hematologic/Lymphatic/Immunologic: negative  Endocrine: negative          O:                                   NAD, WDWN, Alert & Oriented, Mood & Affect wnl, Vitals stable                                         Here today alone                                         /74 (BP Location: Left arm, Patient Position: Sitting, Cuff Size: Adult Large)  Pulse 79  Temp 98.7  F (37.1  C) (Tympanic)                                         General appearance normal                                         Vitals stable                                         Alert, oriented and in no acute distress                                             Following lymph nodes palpated: Occipital, Cervical, Supraclavicular , axilla, inguinal, femoral no lad                                         Stuck on papules and brown macules on trunk and ext                                          Pink papules  on trunk                           Red papules on trunk             The remainder of the full exam was unremarkable; the following areas were examined:  conjunctiva/lids, oral mucosa, neck, peripheral vascular system, abdomen, lymph nodes, digits/nails, eccrine and apocrine glands, scalp/hair, face, neck, chest, abdomen, buttocks, back, RUE, LUE, RLE, LLE                                                         Eyes: Conjunctivae/lids:Normal                                                     ENT: Lips, buccal mucosa, tongue: normal                                                    MSK:Normal                                                    Cardiovascular: peripheral edema none                                                    Pulm: Breathing Normal                                                    Lymph Nodes: No Head and Neck Lymphadenopathy                                                     Neuro/Psych: Orientation:Normal; Mood/Affect:Normal          A/P:  1. Metastatic melanoma, seborrheic keratosis, lentigo, dermal nevi, angioma, hx of basal cell carcinoma         MELANOMA DISCUSSED WITH PATIENT:  I discussed the specifics of tumor, prognosis, metachronous melanoma, self exam, and genetics with the patient. I explained the need for monthly skin exams including and taught the patient how to do this. Patient was asked about new or changing moles and a full skin exam was performed.   BENIGN LESIONS DISCUSSED WITH PATIENT:  I discussed the specifics of tumor, prognosis, and genetics of benign lesions.  I explained that treatment of these lesions would be purely cosmetic and not medically neccessary.  I discussed with patient different removal options including excision, cautery and /or laser.        Nature and genetics of benign skin lesions dicussed with patient.  Signs and Symptoms of skin cancer discussed with patient.  ABCDEs of melanoma reviewed with patient.  Patient encouraged to perform monthly skin  exams.  UV precautions reviewed with patient.  Skin care regimen reviewed with patient: Eliminate harsh soaps, i.e. Dial, zest, irsih spring; Mild soaps such as Cetaphil or Dove sensitive skin, avoid hot or cold showers, aggressive use of emollients including vanicream, cetaphil or cerave discussed with patient.    Risks of non-melanoma skin cancer discussed with patient   Return to clinic 3 months

## 2019-05-15 NOTE — TELEPHONE ENCOUNTER
Requested Prescriptions   Pending Prescriptions Disp Refills     pregabalin (LYRICA) 75 MG capsule 60 capsule 1     Sig: Take 1 capsule (75 mg) by mouth 2 times daily       There is no refill protocol information for this order        Last Written Prescription Date:  1/28/19  Last Fill Quantity: 180,  # refills: 3   Last office visit: 1/28/2019 with prescribing provider:  Evangelista   Future Office Visit:   Next 5 appointments (look out 90 days)    Jul 16, 2019  1:00 PM CDT  Return Visit with Lowell Bullock MD  [facility] [address] [place] [id number] [phone]

## 2019-05-16 RX ORDER — PREGABALIN 75 MG/1
75 CAPSULE ORAL 2 TIMES DAILY
Qty: 60 CAPSULE | Refills: 1 | Status: SHIPPED | OUTPATIENT
Start: 2019-05-16 | End: 2019-07-17

## 2019-05-16 NOTE — TELEPHONE ENCOUNTER
Routing refill request to provider for review/approval because:  Drug not on the [facility] refill protocol   Last instructions at 1/28/19 OV:  Patient Instructions   Increase Lyrica to 100 mg twice daily (add 25 mg to the 75 mg you are already taking).    Sarah FAM RN

## 2019-05-17 ENCOUNTER — HOSPITAL ENCOUNTER (OUTPATIENT)
Dept: PHYSICAL THERAPY | Facility: CLINIC | Age: 44
Setting detail: THERAPIES SERIES
End: 2019-05-17
Attending: FAMILY MEDICINE
Payer: COMMERCIAL

## 2019-05-17 PROCEDURE — 97140 MANUAL THERAPY 1/> REGIONS: CPT | Mod: GP | Performed by: REHABILITATION PRACTITIONER

## 2019-05-22 ENCOUNTER — HOSPITAL ENCOUNTER (OUTPATIENT)
Dept: PHYSICAL THERAPY | Facility: CLINIC | Age: 44
Setting detail: THERAPIES SERIES
End: 2019-05-22
Attending: FAMILY MEDICINE
Payer: COMMERCIAL

## 2019-05-22 PROCEDURE — 97140 MANUAL THERAPY 1/> REGIONS: CPT | Mod: GP | Performed by: REHABILITATION PRACTITIONER

## 2019-05-28 ENCOUNTER — HOSPITAL ENCOUNTER (OUTPATIENT)
Dept: PHYSICAL THERAPY | Facility: CLINIC | Age: 44
Setting detail: THERAPIES SERIES
End: 2019-05-28
Attending: FAMILY MEDICINE
Payer: COMMERCIAL

## 2019-05-28 PROCEDURE — 97140 MANUAL THERAPY 1/> REGIONS: CPT | Mod: GP | Performed by: REHABILITATION PRACTITIONER

## 2019-05-29 ENCOUNTER — HOSPITAL ENCOUNTER (OUTPATIENT)
Dept: PHYSICAL THERAPY | Facility: CLINIC | Age: 44
Setting detail: THERAPIES SERIES
End: 2019-05-29
Attending: FAMILY MEDICINE
Payer: COMMERCIAL

## 2019-05-29 PROCEDURE — 97140 MANUAL THERAPY 1/> REGIONS: CPT | Mod: GP | Performed by: PHYSICAL THERAPIST

## 2019-06-03 ENCOUNTER — HOSPITAL ENCOUNTER (OUTPATIENT)
Dept: PHYSICAL THERAPY | Facility: CLINIC | Age: 44
Setting detail: THERAPIES SERIES
End: 2019-06-03
Attending: FAMILY MEDICINE
Payer: COMMERCIAL

## 2019-06-03 PROCEDURE — 97140 MANUAL THERAPY 1/> REGIONS: CPT | Mod: GP | Performed by: REHABILITATION PRACTITIONER

## 2019-06-05 ENCOUNTER — HOSPITAL ENCOUNTER (OUTPATIENT)
Dept: PHYSICAL THERAPY | Facility: CLINIC | Age: 44
Setting detail: THERAPIES SERIES
End: 2019-06-05
Attending: FAMILY MEDICINE
Payer: COMMERCIAL

## 2019-06-05 PROCEDURE — 97140 MANUAL THERAPY 1/> REGIONS: CPT | Mod: GP | Performed by: REHABILITATION PRACTITIONER

## 2019-06-06 NOTE — PROGRESS NOTES
Outpatient Physical Therapy Progress Note     Patient: Doretha Fernandez  : 1975    Beginning/End Dates of Reporting Period:  2019 to 2019    Referring Provider: Dr. Anna Naidu MD    Therapy Diagnosis: LUE, L-breast and distal L-axillary lymphedema     Client Self Report: achy in the morning    Objective Measurements:  Objective Measure: girth  Details: -2.3% L UE, 3cm decrease in L-breast    Objective Measure: H and N garment  Details: chest measurement 122.7cm     Outcome Measures (most recent score):   LLIS = 42 on date of initial evaluation; pt will again complete LLIS at time of discharge     Goals:  Goal Identifier stg   Goal Description pt to have around the clock tolerance to LUE GCB for lymphedema reduction response   Target Date 19   Date Met  (hold GCB due to increased edema in neck)   Progress:     Goal Identifier stg   Goal Description pt to have in-home set-up and education completed for Flexitouch compression pump and use daily for decreased lymphedema and associated symptoms   Target Date 19   Date Met      Progress:     Goal Identifier ltg   Goal Description once appropriate, pt to be independent with donning, dofing and care of compression sleeve and glove for longterm lymphedema mangement for maintenance   Target Date 19   Date Met      Progress:     Goal Identifier ltg   Goal Description pt to be independent with managing LUE/LUQ lymphedema longterm via HEP, elevation, skin cares, MLD/pump use and compression garment use/wear   Target Date 19   Date Met      Progress:     Goal Identifier ltg   Goal Description pt to have at least 8 point improvement on LLIS due to decreased lymphedema in LUE/LUQ and decreased associated symptoms    Target Date 19   Date Met      Progress:     Progress Toward Goals:   Patient is making fairly good progress toward goals with good reduction response in LUE and L-breast lymphedema. Pt is in process of getting new  compression pump pieces that appropriately fit her for LUE/LUQ lymphedema as well as getting new/additional H&N compression pump pieces to address cervical/facial edema.  Ongoing 2x/week appts are appropriate until all pieces received and pt independent in using.      Plan:  Continue therapy per current plan of care.    Discharge:  No

## 2019-06-10 ENCOUNTER — TELEPHONE (OUTPATIENT)
Dept: FAMILY MEDICINE | Facility: CLINIC | Age: 44
End: 2019-06-10

## 2019-06-10 ENCOUNTER — MEDICAL CORRESPONDENCE (OUTPATIENT)
Dept: HEALTH INFORMATION MANAGEMENT | Facility: CLINIC | Age: 44
End: 2019-06-10

## 2019-06-11 ENCOUNTER — HOSPITAL ENCOUNTER (OUTPATIENT)
Dept: PHYSICAL THERAPY | Facility: CLINIC | Age: 44
Setting detail: THERAPIES SERIES
End: 2019-06-11
Attending: FAMILY MEDICINE
Payer: COMMERCIAL

## 2019-06-11 PROCEDURE — 97140 MANUAL THERAPY 1/> REGIONS: CPT | Mod: GP | Performed by: REHABILITATION PRACTITIONER

## 2019-06-25 ENCOUNTER — HOSPITAL ENCOUNTER (OUTPATIENT)
Dept: PHYSICAL THERAPY | Facility: CLINIC | Age: 44
Setting detail: THERAPIES SERIES
End: 2019-06-25
Attending: FAMILY MEDICINE
Payer: COMMERCIAL

## 2019-06-25 PROCEDURE — 97140 MANUAL THERAPY 1/> REGIONS: CPT | Mod: GP | Performed by: REHABILITATION PRACTITIONER

## 2019-06-27 ENCOUNTER — HOSPITAL ENCOUNTER (OUTPATIENT)
Dept: PHYSICAL THERAPY | Facility: CLINIC | Age: 44
Setting detail: THERAPIES SERIES
End: 2019-06-27
Attending: FAMILY MEDICINE
Payer: COMMERCIAL

## 2019-06-27 PROCEDURE — 97140 MANUAL THERAPY 1/> REGIONS: CPT | Mod: GP | Performed by: REHABILITATION PRACTITIONER

## 2019-06-28 ENCOUNTER — MYC MEDICAL ADVICE (OUTPATIENT)
Dept: FAMILY MEDICINE | Facility: CLINIC | Age: 44
End: 2019-06-28

## 2019-07-11 ENCOUNTER — TRANSFERRED RECORDS (OUTPATIENT)
Dept: HEALTH INFORMATION MANAGEMENT | Facility: CLINIC | Age: 44
End: 2019-07-11

## 2019-07-11 LAB
ALT SERPL-CCNC: 26 U/L (ref 7–45)
AST SERPL-CCNC: 20 U/L (ref 8–43)
CREAT SERPL-MCNC: 0.85 MG/DL (ref 0.59–1.04)
GFR SERPL CREATININE-BSD FRML MDRD: 84 ML/MIN/BSA
GLUCOSE SERPL-MCNC: 92 MG/DL (ref 70–140)
POTASSIUM SERPL-SCNC: 4 MMOL/L (ref 3.6–5.2)

## 2019-07-12 ENCOUNTER — TRANSFERRED RECORDS (OUTPATIENT)
Dept: HEALTH INFORMATION MANAGEMENT | Facility: CLINIC | Age: 44
End: 2019-07-12

## 2019-07-12 LAB
AST SERPL-CCNC: 19 U/L (ref 8–43)
CREAT SERPL-MCNC: 0.8 MG/DL (ref 0.59–1.04)
CREAT SERPL-MCNC: 80 MG/DL (ref 0.59–1.04)
GFR SERPL CREATININE-BSD FRML MDRD: >90 ML/MIN/1.73M2
GFR SERPL CREATININE-BSD FRML MDRD: >90 ML/MIN/BSA

## 2019-07-13 ENCOUNTER — TRANSFERRED RECORDS (OUTPATIENT)
Dept: HEALTH INFORMATION MANAGEMENT | Facility: CLINIC | Age: 44
End: 2019-07-13

## 2019-07-16 ENCOUNTER — OFFICE VISIT (OUTPATIENT)
Dept: DERMATOLOGY | Facility: CLINIC | Age: 44
End: 2019-07-16
Payer: COMMERCIAL

## 2019-07-16 VITALS — DIASTOLIC BLOOD PRESSURE: 81 MMHG | HEART RATE: 78 BPM | SYSTOLIC BLOOD PRESSURE: 126 MMHG | OXYGEN SATURATION: 95 %

## 2019-07-16 DIAGNOSIS — D18.01 ANGIOMA OF SKIN: ICD-10-CM

## 2019-07-16 DIAGNOSIS — E55.9 VITAMIN D DEFICIENCY: Primary | ICD-10-CM

## 2019-07-16 DIAGNOSIS — L82.1 SEBORRHEIC KERATOSIS: ICD-10-CM

## 2019-07-16 DIAGNOSIS — D22.9 NEVUS: ICD-10-CM

## 2019-07-16 DIAGNOSIS — L81.4 LENTIGO: ICD-10-CM

## 2019-07-16 DIAGNOSIS — C43.9 METASTATIC MELANOMA (H): Primary | ICD-10-CM

## 2019-07-16 DIAGNOSIS — Z85.828 HISTORY OF BASAL CELL CANCER: ICD-10-CM

## 2019-07-16 PROCEDURE — 99213 OFFICE O/P EST LOW 20 MIN: CPT | Performed by: DERMATOLOGY

## 2019-07-16 NOTE — NURSING NOTE
"Initial /81   Pulse 78   SpO2 95%  Estimated body mass index is 40.74 kg/m  as calculated from the following:    Height as of 1/28/19: 1.6 m (5' 3\").    Weight as of 3/5/19: 104.3 kg (230 lb). .    Halley Pompa LPN    "

## 2019-07-16 NOTE — PROGRESS NOTES
Doretha Fernandez is a 44 year old year old female patient here today for f/u h xof melanoma unknown primary.  She went to Dr. Cool for node dissection.1 out of 20 nodes positive.  No adjuvant Radiation.  of brain and PET scan both clear.  Was on  Nivolumab got 8 injection but got colitis.  She went to [place].   She was treated with high dose steroids for colitis and had a dx of bacterial pneumonia.   She was admitted for to hospital for this. She is followed by GI and is on pred taper. Prednisone is getting decrease, edema is improiving.  She is off of melanoma meds.  She was also dx with seronegative rheumatoid arthritis and is to start tocilizumab.    She denies any new or changing skin lesions.  Patient reports the following modifying factors none.  Associated symptoms: none.  Patient has no other skin complaints today.  Remainder of the HPI, Meds, PMH, Allergies, FH, and SH was reviewed in chart. Current PET-CT scan, physical exam and laboratory tests demonstrate no evidence of melanoma recurrence.        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      Anxiety      Basal cell carcinoma      Depression      Lumbago     left lower back pain     Malignant melanoma (H)      Mild persistent asthma      Prothrombin deficiency (H)     takes 81mg asa daily     Stroke (cerebrum) (H)     During      TIA (transient ischemic attack)        Past Surgical History:   Procedure Laterality Date     COLONOSCOPY N/A 10/18/2017    Procedure: COLONOSCOPY;  Colon;  Surgeon: Debbie Stephens MD;  [facility]     COLONOSCOPY N/A 3/9/2018    Procedure: COMBINED COLONOSCOPY, SINGLE OR MULTIPLE BIOPSY/POLYPECTOMY BY BIOPSY;  colon  ;  Surgeon: Benita Schumacher MD;  [facility]     DISSECT LYMPH NODE AXILLA Left 10/23/2017    Procedure: DISSECT LYMPH NODE AXILLA;  Left Axillary Lymph Node Dissection ;  Surgeon: Laurent Cool MD;  [facility]      GYN SURGERY           REPAIR MOHS Left 2017    Procedure: REPAIR MOHS;  Left Upper Lid Moh's Reconstruction;  Surgeon: Kisha Bosch MD;  Location:  OR        Family History   Problem Relation Age of Onset     Cancer Mother 45        lung     Neurologic Disorder Mother      Lipids Father      Gastrointestinal Disease Father      Depression Father      Cancer Maternal Grandmother      Blood Disease Maternal Grandmother      Arthritis Maternal Grandmother      Diabetes Maternal Grandmother      Depression Maternal Grandmother      Macular Degeneration Maternal Grandmother      Glaucoma Maternal Grandmother      Diabetes Maternal Grandfather      Cerebrovascular Disease Maternal Grandfather      Blood Disease Maternal Grandfather      Heart Disease Maternal Grandfather      Glaucoma Maternal Grandfather      Cancer Paternal Grandmother      Cancer - colorectal Paternal Grandmother      Respiratory Paternal Grandfather      Blood Disease Paternal Grandfather      Heart Disease Daughter      Asthma Daughter      Depression Sister      Melanoma No family hx of        Social History     Socioeconomic History     Marital status:      Spouse name: Not on file     Number of children: Not on file     Years of education: Not on file     Highest education level: Not on file   Occupational History     Not on file   Social Needs     Financial resource strain: Not on file     Food insecurity:     Worry: Not on file     Inability: Not on file     Transportation needs:     Medical: Not on file     Non-medical: Not on file   Tobacco Use     Smoking status: Passive Smoke Exposure - Never Smoker     Smokeless tobacco: Never Used   Substance and Sexual Activity     Alcohol use: No     Comment: occ     Drug use: No     Sexual activity: Yes     Partners: Male     Birth control/protection: Surgical   Lifestyle     Physical activity:     Days per week: Not on file     Minutes per session: Not on file      Stress: Not on file   Relationships     Social connections:     Talks on phone: Not on file     Gets together: Not on file     Attends Mandaeism service: Not on file     Active member of club or organization: Not on file     Attends meetings of clubs or organizations: Not on file     Relationship status: Not on file     Intimate partner violence:     Fear of current or ex partner: Not on file     Emotionally abused: Not on file     Physically abused: Not on file     Forced sexual activity: Not on file   Other Topics Concern     Parent/sibling w/ CABG, MI or angioplasty before 65F 55M? No   Social History Narrative    20 y.o- patient's mother   of lung cancer. She had to take care of her younger sister.    2012- patient's  had a heart attack with stents placed, followed by cardiac rehabilitation    2000 TO 2012  was in [facility] hospital for depression    2013 patient's  went through alcohol rehabilitation at [place] inpatient            They have attended couple counseling a couple of times and patient went to the family program for chemical dependency.    Patient denies alcohol or drug use and herself            Has 2 children, girls ages 10 and 13     For a while she was working 3 jobs since her  was ill. 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 Reports her job is very stressful.           Outpatient Encounter Medications as of 2019   Medication Sig Dispense Refill     adalimumab (HUMIRA *CF* PEN) 40 MG/0.4ML pen kit Inject 40 mg Subcutaneous       Calcium Carbonate (CALCIUM-CARB 600 PO)        Cholecalciferol (VITAMIN D3) 3000 units TABS Take 3,000 Int'l Units by mouth daily 90 tablet 2     MISC NATURAL PRODUCTS PO        predniSONE (DELTASONE) 20 MG tablet 60mg daily; further taper instructions to follow on        pregabalin (LYRICA) 25 MG capsule Take 1 capsule (25 mg) by mouth 2 times daily Add to 75 mg for  "a total of 100 twice daily 180 capsule 3     pregabalin (LYRICA) 75 MG capsule Take 1 capsule (75 mg) by mouth 2 times daily 60 capsule 1     venlafaxine (EFFEXOR-ER) 225 MG 24 hr tablet Take 1 tablet (225 mg) by mouth daily 90 each 1     Acetaminophen (TYLENOL PO) Take 1,000 mg by mouth every 8 hours as needed for mild pain or fever       fluocinonide (LIDEX) 0.05 % external cream Apply sparingly to affected area twice daily as needed.  Do not apply to face. (Patient not taking: Reported on 7/16/2019) 120 g 3     ondansetron (ZOFRAN) 8 MG tablet Take by mouth every 8 hours as needed for nausea       order for DME Equipment being ordered: X2 Wearease Compression shirt. (Patient not taking: Reported on 7/16/2019) 2 each 1     order for DME Equipment being ordered: 20-30 mmHg compression sleeve and 20-30 mmHg compression glove x 2 pair. (Patient not taking: Reported on 7/16/2019) 2 each 1     order for DME Equipment being ordered: x2 Wearease compression shirt (Patient not taking: Reported on 7/16/2019) 1 each 0     order for DME Equipment being ordered: 20-30mmHg compression sleeve and 20-30mmHg compression glove\" x2 pair (Patient not taking: Reported on 7/16/2019) 1 Units 0     promethazine (PHENERGAN) 25 MG tablet Take 1 tablet (25 mg) by mouth every 4 hours as needed for nausea or vomiting (Patient not taking: Reported on 7/16/2019) 56 tablet 0     [DISCONTINUED] rOPINIRole (REQUIP) 0.25 MG tablet Take 1 tablet (0.25 mg) by mouth At Bedtime (Patient not taking: Reported on 2/18/2019) 30 tablet 0     [DISCONTINUED] sulfamethoxazole-trimethoprim (BACTRIM DS/SEPTRA DS) 800-160 MG tablet 1 tablet po on Mondays, Wednesdays and Fridays       Facility-Administered Encounter Medications as of 7/16/2019   Medication Dose Route Frequency Provider Last Rate Last Dose     lidocaine 1 % 9 mL  9 mL Intradermal Once Anna Naidu MD         sodium bicarbonate 8.4 % injection 1 mEq  1 mEq Intradermal Once Anna Naidu MD   "               Review Of Systems  Skin: As above  Eyes: negative  Ears/Nose/Throat: negative  Respiratory: No shortness of breath, dyspnea on exertion, cough, or hemoptysis  Cardiovascular: negative  Gastrointestinal: negative  Genitourinary: negative  Musculoskeletal: negative  Neurologic: negative  Psychiatric: negative  Hematologic/Lymphatic/Immunologic: negative  Endocrine: negative      O:   NAD, WDWN, Alert & Oriented, Mood & Affect wnl, Vitals stable   Here today alone   /81   Pulse 78   SpO2 95%    General appearance normal   Vitals stable   Alert, oriented and in no acute distress        Following lymph nodes palpated: Occipital, Cervical, Supraclavicular , axilla, inguinal, femoral no lad                                         Stuck on papules and brown macules on trunk and ext                                          Pink papules on trunk                           Red papules on trunk              The remainder of the full exam was unremarkable; the following areas were examined:  conjunctiva/lids, oral mucosa, neck, peripheral vascular system, abdomen, lymph nodes, digits/nails, eccrine and apocrine glands, scalp/hair, face, neck, chest, abdomen, buttocks, back, RUE, LUE, RLE, LLE                                                         Eyes: Conjunctivae/lids:Normal                                                     ENT: Lips, buccal mucosa, tongue: normal                                                    MSK:Normal                                                    Cardiovascular: peripheral edema none                                                    Pulm: Breathing Normal                                                    Lymph Nodes: No Head and Neck Lymphadenopathy                                                     Neuro/Psych: Orientation:Normal; Mood/Affect:Normal          A/P:  1. Metastatic melanoma, seborrheic keratosis, lentigo, dermal nevi, angioma, hx of basal cell  carcinoma         MELANOMA DISCUSSED WITH PATIENT:  I discussed the specifics of tumor, prognosis, metachronous melanoma, self exam, and genetics with the patient. I explained the need for monthly skin exams including and taught the patient how to do this. Patient was asked about new or changing moles and a full skin exam was performed.   BENIGN LESIONS DISCUSSED WITH PATIENT:  I discussed the specifics of tumor, prognosis, and genetics of benign lesions.  I explained that treatment of these lesions would be purely cosmetic and not medically neccessary.  I discussed with patient different removal options including excision, cautery and /or laser.        Nature and genetics of benign skin lesions dicussed with patient.  Signs and Symptoms of skin cancer discussed with patient.  ABCDEs of melanoma reviewed with patient.  Patient encouraged to perform monthly skin exams.  UV precautions reviewed with patient.  Skin care regimen reviewed with patient: Eliminate harsh soaps, i.e. Dial, zest, irsih spring; Mild soaps such as Cetaphil or Dove sensitive skin, avoid hot or cold showers, aggressive use of emollients including vanicream, cetaphil or cerave discussed with patient.    Risks of non-melanoma skin cancer discussed with patient   Return to clinic 3 months

## 2019-07-16 NOTE — LETTER
2019         RE: Doretha Fernandez  [address]  [place] [address]        Dear Colleague,    Thank you for referring your patient, Doretha Fernandez, to the [facility]. Please see a copy of my visit note below.    Doretha Fernandez is a 44 year old year old female patient here today for f/u h xof melanoma unknown primary.  She went to Dr. Cool for node dissection.1 out of 20 nodes positive.  No adjuvant Radiation.  of brain and PET scan both clear.  Was on  Nivolumab got 8 injection but got colitis.  She went to [place].   She was treated with high dose steroids for colitis and had a dx of bacterial pneumonia.   She was admitted for to hospital for this. She is followed by GI and is on pred taper. Prednisone is getting decrease, edema is improiving.  She is off of melanoma meds.  She was also dx with seronegative rheumatoid arthritis and is to start tocilizumab.    She denies any new or changing skin lesions.  Patient reports the following modifying factors none.  Associated symptoms: none.  Patient has no other skin complaints today.  Remainder of the HPI, Meds, PMH, Allergies, FH, and SH was reviewed in chart. Current PET-CT scan, physical exam and laboratory tests demonstrate no evidence of melanoma recurrence.        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      Anxiety      Basal cell carcinoma      Depression      Lumbago     left lower back pain     Malignant melanoma (H)      Mild persistent asthma      Prothrombin deficiency (H)     takes 81mg asa daily     Stroke (cerebrum) (H)     During      TIA (transient ischemic attack)        Past Surgical History:   Procedure Laterality Date     COLONOSCOPY N/A 10/18/2017    Procedure: COLONOSCOPY;  Colon;  Surgeon: Debbie Stephens MD;  [facility]     COLONOSCOPY N/A 3/9/2018    Procedure: COMBINED COLONOSCOPY, SINGLE OR MULTIPLE BIOPSY/POLYPECTOMY  BY BIOPSY;  colon  ;  Surgeon: Benita Schumacher MD;  [facility]     DISSECT LYMPH NODE AXILLA Left 10/23/2017    Procedure: DISSECT LYMPH NODE AXILLA;  Left Axillary Lymph Node Dissection ;  Surgeon: Laurent Cool MD;  [facility]     GYN SURGERY           REPAIR MOHS Left 2017    Procedure: REPAIR MOHS;  Left Upper Lid Moh's Reconstruction;  Surgeon: Kisha Bosch MD;  [facility]        Family History   Problem Relation Age of Onset     Cancer Mother 45        lung     Neurologic Disorder Mother      Lipids Father      Gastrointestinal Disease Father      Depression Father      Cancer Maternal Grandmother      Blood Disease Maternal Grandmother      Arthritis Maternal Grandmother      Diabetes Maternal Grandmother      Depression Maternal Grandmother      Macular Degeneration Maternal Grandmother      Glaucoma Maternal Grandmother      Diabetes Maternal Grandfather      Cerebrovascular Disease Maternal Grandfather      Blood Disease Maternal Grandfather      Heart Disease Maternal Grandfather      Glaucoma Maternal Grandfather      Cancer Paternal Grandmother      Cancer - colorectal Paternal Grandmother      Respiratory Paternal Grandfather      Blood Disease Paternal Grandfather      Heart Disease Daughter      Asthma Daughter      Depression Sister      Melanoma No family hx of        Social History     Socioeconomic History     Marital status:      Spouse name: Not on file     Number of children: Not on file     Years of education: Not on file     Highest education level: Not on file   Occupational History     Not on file   Social Needs     Financial resource strain: Not on file     Food insecurity:     Worry: Not on file     Inability: Not on file     Transportation needs:     Medical: Not on file     Non-medical: Not on file   Tobacco Use     Smoking status: Passive Smoke Exposure - Never Smoker     Smokeless tobacco: Never Used   Substance and Sexual Activity      Alcohol use: No     Comment: occ     Drug use: No     Sexual activity: Yes     Partners: Male     Birth control/protection: Surgical   Lifestyle     Physical activity:     Days per week: Not on file     Minutes per session: Not on file     Stress: Not on file   Relationships     Social connections:     Talks on phone: Not on file     Gets together: Not on file     Attends [employer] service: Not on file     Active member of club or organization: Not on file     Attends meetings of clubs or organizations: Not on file     Relationship status: Not on file     Intimate partner violence:     Fear of current or ex partner: Not on file     Emotionally abused: Not on file     Physically abused: Not on file     Forced sexual activity: Not on file   Other Topics Concern     Parent/sibling w/ CABG, MI or angioplasty before 65F 55M? No   Social History Narrative    20 y.o- patient's mother   of lung cancer. She had to take care of her younger sister.    2012- patient's  had a heart attack with stents placed, followed by cardiac rehabilitation    2000 TO 2012  was in [facility] for depression    2013 patient's  went through alcohol rehabilitation at [place] inpatient            They have attended couple counseling a couple of times and patient went to the family program for chemical dependency.    Patient denies alcohol or drug use and herself            Has 2 children, girls ages 10 and 13     For a while she was working 3 jobs since her  was ill. Works at the [employer] for [employer]. Reports her job is very stressful.           Outpatient Encounter Medications as of 2019   Medication Sig Dispense Refill     adalimumab (HUMIRA *CF* PEN) 40 MG/0.4ML pen kit Inject 40 mg Subcutaneous       Calcium Carbonate (CALCIUM-CARB 600 PO)        Cholecalciferol (VITAMIN D3) 3000 units TABS Take 3,000 Int'l Units by mouth daily 90  "tablet 2     MISC NATURAL PRODUCTS PO        predniSONE (DELTASONE) 20 MG tablet 60mg daily; further taper instructions to follow on 11/26       pregabalin (LYRICA) 25 MG capsule Take 1 capsule (25 mg) by mouth 2 times daily Add to 75 mg for a total of 100 twice daily 180 capsule 3     pregabalin (LYRICA) 75 MG capsule Take 1 capsule (75 mg) by mouth 2 times daily 60 capsule 1     venlafaxine (EFFEXOR-ER) 225 MG 24 hr tablet Take 1 tablet (225 mg) by mouth daily 90 each 1     Acetaminophen (TYLENOL PO) Take 1,000 mg by mouth every 8 hours as needed for mild pain or fever       fluocinonide (LIDEX) 0.05 % external cream Apply sparingly to affected area twice daily as needed.  Do not apply to face. (Patient not taking: Reported on 7/16/2019) 120 g 3     ondansetron (ZOFRAN) 8 MG tablet Take by mouth every 8 hours as needed for nausea       order for DME Equipment being ordered: X2 Wearease Compression shirt. (Patient not taking: Reported on 7/16/2019) 2 each 1     order for DME Equipment being ordered: 20-30 mmHg compression sleeve and 20-30 mmHg compression glove x 2 pair. (Patient not taking: Reported on 7/16/2019) 2 each 1     order for DME Equipment being ordered: x2 Wearease compression shirt (Patient not taking: Reported on 7/16/2019) 1 each 0     order for DME Equipment being ordered: 20-30mmHg compression sleeve and 20-30mmHg compression glove\" x2 pair (Patient not taking: Reported on 7/16/2019) 1 Units 0     promethazine (PHENERGAN) 25 MG tablet Take 1 tablet (25 mg) by mouth every 4 hours as needed for nausea or vomiting (Patient not taking: Reported on 7/16/2019) 56 tablet 0     [DISCONTINUED] rOPINIRole (REQUIP) 0.25 MG tablet Take 1 tablet (0.25 mg) by mouth At Bedtime (Patient not taking: Reported on 2/18/2019) 30 tablet 0     [DISCONTINUED] sulfamethoxazole-trimethoprim (BACTRIM DS/SEPTRA DS) 800-160 MG tablet 1 tablet po on Mondays, Wednesdays and Fridays       Facility-Administered Encounter " Medications as of 7/16/2019   Medication Dose Route Frequency Provider Last Rate Last Dose     lidocaine 1 % 9 mL  9 mL Intradermal Once Anna Naidu MD         sodium bicarbonate 8.4 % injection 1 mEq  1 mEq Intradermal Once Anna Naidu MD                 Review Of Systems  Skin: As above  Eyes: negative  Ears/Nose/Throat: negative  Respiratory: No shortness of breath, dyspnea on exertion, cough, or hemoptysis  Cardiovascular: negative  Gastrointestinal: negative  Genitourinary: negative  Musculoskeletal: negative  Neurologic: negative  Psychiatric: negative  Hematologic/Lymphatic/Immunologic: negative  Endocrine: negative      O:   NAD, WDWN, Alert & Oriented, Mood & Affect wnl, Vitals stable   Here today alone   /81   Pulse 78   SpO2 95%    General appearance normal   Vitals stable   Alert, oriented and in no acute distress        Following lymph nodes palpated: Occipital, Cervical, Supraclavicular , axilla, inguinal, femoral no lad                                         Stuck on papules and brown macules on trunk and ext                                          Pink papules on trunk                           Red papules on trunk              The remainder of the full exam was unremarkable; the following areas were examined:  conjunctiva/lids, oral mucosa, neck, peripheral vascular system, abdomen, lymph nodes, digits/nails, eccrine and apocrine glands, scalp/hair, face, neck, chest, abdomen, buttocks, back, RUE, LUE, RLE, LLE                                                         Eyes: Conjunctivae/lids:Normal                                                     ENT: Lips, buccal mucosa, tongue: normal                                                    MSK:Normal                                                    Cardiovascular: peripheral edema none                                                    Pulm: Breathing Normal                                                    Lymph Nodes: No Head  and Neck Lymphadenopathy                                                     Neuro/Psych: Orientation:Normal; Mood/Affect:Normal          A/P:  1. Metastatic melanoma, seborrheic keratosis, lentigo, dermal nevi, angioma, hx of basal cell carcinoma         MELANOMA DISCUSSED WITH PATIENT:  I discussed the specifics of tumor, prognosis, metachronous melanoma, self exam, and genetics with the patient. I explained the need for monthly skin exams including and taught the patient how to do this. Patient was asked about new or changing moles and a full skin exam was performed.   BENIGN LESIONS DISCUSSED WITH PATIENT:  I discussed the specifics of tumor, prognosis, and genetics of benign lesions.  I explained that treatment of these lesions would be purely cosmetic and not medically neccessary.  I discussed with patient different removal options including excision, cautery and /or laser.        Nature and genetics of benign skin lesions dicussed with patient.  Signs and Symptoms of skin cancer discussed with patient.  ABCDEs of melanoma reviewed with patient.  Patient encouraged to perform monthly skin exams.  UV precautions reviewed with patient.  Skin care regimen reviewed with patient: Eliminate harsh soaps, i.e. Dial, zest, irsih spring; Mild soaps such as Cetaphil or Dove sensitive skin, avoid hot or cold showers, aggressive use of emollients including vanicream, cetaphil or cerave discussed with patient.    Risks of non-melanoma skin cancer discussed with patient   Return to clinic 3 months          Again, thank you for allowing me to participate in the care of your patient.        Sincerely,        Lowell Bullock MD

## 2019-07-16 NOTE — TELEPHONE ENCOUNTER
"Requested Prescriptions   Pending Prescriptions Disp Refills     VITAMIN D3 1000 units tablet [Pharmacy Med Name: VITAMIN D3 1000UNIT TABS] 270 tablet 0     Sig: TAKE THREE TABLETS BY MOUTH EVERY DAY   Last Written Prescription Date:  9/7/18  Last Fill Quantity: 90 tab,  # refills: 2   Last office visit: 1/28/2019 with prescribing provider:  FRANK Naidu   Future Office Visit:        Vitamin Supplements (Adult) Protocol Passed - 7/16/2019  1:56 PM        Passed - High dose Vitamin D not ordered        Passed - Recent (12 mo) or future (30 days) visit within the authorizing provider's specialty     Patient had office visit in the last 12 months or has a visit in the next 30 days with authorizing provider or within the authorizing provider's specialty.  See \"Patient Info\" tab in inbasket, or \"Choose Columns\" in Meds & Orders section of the refill encounter.              Passed - Medication is active on med list          "

## 2019-07-17 ENCOUNTER — TRANSFERRED RECORDS (OUTPATIENT)
Dept: HEALTH INFORMATION MANAGEMENT | Facility: CLINIC | Age: 44
End: 2019-07-17

## 2019-07-17 DIAGNOSIS — M19.90 INFLAMMATORY ARTHRITIS: ICD-10-CM

## 2019-07-17 RX ORDER — CHOLECALCIFEROL (VITAMIN D3) 25 MCG
TABLET ORAL
Qty: 270 TABLET | Refills: 1 | Status: SHIPPED | OUTPATIENT
Start: 2019-07-17 | End: 2019-12-11

## 2019-07-17 RX ORDER — PREGABALIN 75 MG/1
75 CAPSULE ORAL 2 TIMES DAILY
Qty: 60 CAPSULE | Refills: 1 | Status: SHIPPED | OUTPATIENT
Start: 2019-07-17 | End: 2019-09-19

## 2019-07-17 NOTE — TELEPHONE ENCOUNTER
Requested Prescriptions   Pending Prescriptions Disp Refills     pregabalin (LYRICA) 75 MG capsule 60 capsule 1     Sig: Take 1 capsule (75 mg) by mouth 2 times daily       pregabalin (LYRICA) 75 MG capsule  Last Written Prescription Date:  5/16/19  Last Fill Quantity: 60 cap,   # refills: 1  Last Office Visit: 1/28/19   FRANK Naidu  Future Office visit:       Routing refill request to provider for review/approval because:  Drug not on the [facility], [facility] or [facility] refill protocol or controlled substance      There is no refill protocol information for this order

## 2019-07-30 ENCOUNTER — HOSPITAL ENCOUNTER (OUTPATIENT)
Dept: PHYSICAL THERAPY | Facility: CLINIC | Age: 44
Setting detail: THERAPIES SERIES
End: 2019-07-30
Attending: FAMILY MEDICINE
Payer: COMMERCIAL

## 2019-07-30 ENCOUNTER — TELEPHONE (OUTPATIENT)
Dept: PHYSICAL THERAPY | Facility: CLINIC | Age: 44
End: 2019-07-30

## 2019-07-30 DIAGNOSIS — I89.0 SECONDARY LYMPHEDEMA: Primary | ICD-10-CM

## 2019-07-30 PROCEDURE — 97140 MANUAL THERAPY 1/> REGIONS: CPT | Mod: GP | Performed by: REHABILITATION PRACTITIONER

## 2019-07-30 NOTE — TELEPHONE ENCOUNTER
Dr. Naidu I am placing a DME request in epic for your signature at your earliest convenience. Thank you

## 2019-08-15 ENCOUNTER — HOSPITAL ENCOUNTER (OUTPATIENT)
Dept: PHYSICAL THERAPY | Facility: CLINIC | Age: 44
Setting detail: THERAPIES SERIES
End: 2019-08-15
Attending: FAMILY MEDICINE
Payer: COMMERCIAL

## 2019-08-15 PROCEDURE — 97140 MANUAL THERAPY 1/> REGIONS: CPT | Mod: GP | Performed by: REHABILITATION PRACTITIONER

## 2019-08-27 NOTE — PROGRESS NOTES
Outpatient Physical Therapy Progress Note     Patient: Doretha Fernandez  : 1975    Beginning/End Dates of Reporting Period:  19 to 8/15/2019    Referring Provider: Dr. Anna Naidu MD    Therapy Diagnosis: L UE, L-breat and distal L-axillary lymphedema     Client Self Report: I feel more swollen today after the trip wore the sleeve on plane but has not worn since home from trip, got flexitouch machine working    Objective Measurements:  Objective Measure: girth  Details: L UE +4.1%, decreased L breast measurement    Outcome Measures (most recent score):   LLIS = 42 on date of initial evaluation; pt will again complete LLIS at time of discharge     Goals:  Goal Identifier stg   Goal Description pt to have around the clock tolerance to LUE GCB for lymphedema reduction response   Target Date 19   Date Met  (hold GCB due to increased edema in neck)   Progress:     Goal Identifier stg   Goal Description pt to have in-home set-up and education completed for Flexitouch compression pump and use daily for decreased lymphedema and associated symptoms   Target Date 19   Date Met  08/15/19   Progress:     Goal Identifier ltg   Goal Description once appropriate, pt to be independent with donning, dofing and care of compression sleeve and glove for longterm lymphedema mangement for maintenance   Target Date 19   Date Met      Progress:     Goal Identifier ltg   Goal Description pt to be independent with managing LUE/LUQ lymphedema longterm via HEP, elevation, skin cares, MLD/pump use and compression garment use/wear   Target Date 19   Date Met      Progress:     Goal Identifier ltg   Goal Description pt to have at least 8 point improvement on LLIS due to decreased lymphedema in LUE/LUQ and decreased associated symptoms    Target Date 19   Date Met      Progress:       Progress Toward Goals:   Progress this reporting period: Patient is currently wearing compression sleeve and has been using  the flexitouch with arm, chest, and head/neck sleeves.  Patient is also currently tapering off of her steroids and is hoping to see more reductions in edema.    Plan:  Continue therapy per current plan of care.    Discharge:  No

## 2019-08-27 NOTE — ADDENDUM NOTE
Encounter addended by: Torrie Gurrola, PT on: 8/27/2019 4:49 PM   Actions taken: Flowsheet accepted, Sign clinical note

## 2019-08-30 DIAGNOSIS — M06.4 INFLAMMATORY POLYARTHROPATHY (H): Primary | ICD-10-CM

## 2019-08-30 LAB
BASOPHILS # BLD AUTO: 0 10E9/L (ref 0–0.2)
BASOPHILS NFR BLD AUTO: 0.2 %
CRP SERPL-MCNC: 28.5 MG/L (ref 0–8)
DIFFERENTIAL METHOD BLD: ABNORMAL
EOSINOPHIL # BLD AUTO: 0.2 10E9/L (ref 0–0.7)
EOSINOPHIL NFR BLD AUTO: 1.2 %
ERYTHROCYTE [DISTWIDTH] IN BLOOD BY AUTOMATED COUNT: 14.1 % (ref 10–15)
ERYTHROCYTE [SEDIMENTATION RATE] IN BLOOD BY WESTERGREN METHOD: 12 MM/H (ref 0–20)
HCT VFR BLD AUTO: 40.4 % (ref 35–47)
HGB BLD-MCNC: 12.9 G/DL (ref 11.7–15.7)
LYMPHOCYTES # BLD AUTO: 3 10E9/L (ref 0.8–5.3)
LYMPHOCYTES NFR BLD AUTO: 22.6 %
MCH RBC QN AUTO: 29.6 PG (ref 26.5–33)
MCHC RBC AUTO-ENTMCNC: 31.9 G/DL (ref 31.5–36.5)
MCV RBC AUTO: 93 FL (ref 78–100)
MONOCYTES # BLD AUTO: 1.1 10E9/L (ref 0–1.3)
MONOCYTES NFR BLD AUTO: 8.4 %
NEUTROPHILS # BLD AUTO: 8.8 10E9/L (ref 1.6–8.3)
NEUTROPHILS NFR BLD AUTO: 67.6 %
PLATELET # BLD AUTO: 292 10E9/L (ref 150–450)
RBC # BLD AUTO: 4.36 10E12/L (ref 3.8–5.2)
WBC # BLD AUTO: 13 10E9/L (ref 4–11)

## 2019-08-30 PROCEDURE — 36415 COLL VENOUS BLD VENIPUNCTURE: CPT | Performed by: INTERNAL MEDICINE

## 2019-08-30 PROCEDURE — 85025 COMPLETE CBC W/AUTO DIFF WBC: CPT | Performed by: INTERNAL MEDICINE

## 2019-08-30 PROCEDURE — 85652 RBC SED RATE AUTOMATED: CPT | Performed by: INTERNAL MEDICINE

## 2019-08-30 PROCEDURE — 86140 C-REACTIVE PROTEIN: CPT | Performed by: INTERNAL MEDICINE

## 2019-09-02 ENCOUNTER — APPOINTMENT (OUTPATIENT)
Dept: CT IMAGING | Facility: CLINIC | Age: 44
End: 2019-09-02
Attending: FAMILY MEDICINE
Payer: COMMERCIAL

## 2019-09-02 ENCOUNTER — APPOINTMENT (OUTPATIENT)
Dept: GENERAL RADIOLOGY | Facility: CLINIC | Age: 44
End: 2019-09-02
Attending: FAMILY MEDICINE
Payer: COMMERCIAL

## 2019-09-02 ENCOUNTER — HOSPITAL ENCOUNTER (EMERGENCY)
Facility: CLINIC | Age: 44
Discharge: HOME | End: 2019-09-02
Attending: FAMILY MEDICINE | Admitting: FAMILY MEDICINE
Payer: COMMERCIAL

## 2019-09-02 VITALS
BODY MASS INDEX: 42.51 KG/M2 | WEIGHT: 240 LBS | OXYGEN SATURATION: 96 % | SYSTOLIC BLOOD PRESSURE: 153 MMHG | HEART RATE: 103 BPM | RESPIRATION RATE: 18 BRPM | TEMPERATURE: 98 F | DIASTOLIC BLOOD PRESSURE: 86 MMHG

## 2019-09-02 DIAGNOSIS — G89.4 CHRONIC PAIN SYNDROME: ICD-10-CM

## 2019-09-02 DIAGNOSIS — M06.9 FLARE OF RHEUMATOID ARTHRITIS (H): ICD-10-CM

## 2019-09-02 LAB
ALBUMIN SERPL-MCNC: 3.5 G/DL (ref 3.4–5)
ALBUMIN UR-MCNC: NEGATIVE MG/DL
ALP SERPL-CCNC: 66 U/L (ref 40–150)
ALT SERPL W P-5'-P-CCNC: 34 U/L (ref 0–50)
ANION GAP SERPL CALCULATED.3IONS-SCNC: 8 MMOL/L (ref 3–14)
APPEARANCE UR: CLEAR
AST SERPL W P-5'-P-CCNC: 14 U/L (ref 0–45)
BACTERIA #/AREA URNS HPF: ABNORMAL /HPF
BASOPHILS # BLD AUTO: 0.1 10E9/L (ref 0–0.2)
BASOPHILS NFR BLD AUTO: 0.4 %
BILIRUB SERPL-MCNC: 0.3 MG/DL (ref 0.2–1.3)
BILIRUB UR QL STRIP: NEGATIVE
BUN SERPL-MCNC: 13 MG/DL (ref 7–30)
CALCIUM SERPL-MCNC: 8.4 MG/DL (ref 8.5–10.1)
CHLORIDE SERPL-SCNC: 106 MMOL/L (ref 94–109)
CO2 SERPL-SCNC: 25 MMOL/L (ref 20–32)
COLOR UR AUTO: YELLOW
CREAT SERPL-MCNC: 0.77 MG/DL (ref 0.52–1.04)
CRP SERPL-MCNC: 66.5 MG/L (ref 0–8)
DEPRECATED S PYO AG THROAT QL EIA: NORMAL
DIFFERENTIAL METHOD BLD: ABNORMAL
EOSINOPHIL # BLD AUTO: 0.1 10E9/L (ref 0–0.7)
EOSINOPHIL NFR BLD AUTO: 0.9 %
ERYTHROCYTE [DISTWIDTH] IN BLOOD BY AUTOMATED COUNT: 13.7 % (ref 10–15)
GFR SERPL CREATININE-BSD FRML MDRD: >90 ML/MIN/{1.73_M2}
GLUCOSE SERPL-MCNC: 106 MG/DL (ref 70–99)
GLUCOSE UR STRIP-MCNC: NEGATIVE MG/DL
HCT VFR BLD AUTO: 37.7 % (ref 35–47)
HETEROPH AB SER QL: NEGATIVE
HGB BLD-MCNC: 12.3 G/DL (ref 11.7–15.7)
HGB UR QL STRIP: NEGATIVE
IMM GRANULOCYTES # BLD: 0.1 10E9/L (ref 0–0.4)
IMM GRANULOCYTES NFR BLD: 0.8 %
KETONES UR STRIP-MCNC: NEGATIVE MG/DL
LACTATE BLD-SCNC: 1.7 MMOL/L (ref 0.7–2)
LEUKOCYTE ESTERASE UR QL STRIP: NEGATIVE
LIPASE SERPL-CCNC: 50 U/L (ref 73–393)
LYMPHOCYTES # BLD AUTO: 2.3 10E9/L (ref 0.8–5.3)
LYMPHOCYTES NFR BLD AUTO: 16 %
MCH RBC QN AUTO: 29.4 PG (ref 26.5–33)
MCHC RBC AUTO-ENTMCNC: 32.6 G/DL (ref 31.5–36.5)
MCV RBC AUTO: 90 FL (ref 78–100)
MONOCYTES # BLD AUTO: 1.2 10E9/L (ref 0–1.3)
MONOCYTES NFR BLD AUTO: 8 %
MUCOUS THREADS #/AREA URNS LPF: PRESENT /LPF
NEUTROPHILS # BLD AUTO: 10.7 10E9/L (ref 1.6–8.3)
NEUTROPHILS NFR BLD AUTO: 73.9 %
NITRATE UR QL: NEGATIVE
NRBC # BLD AUTO: 0 10*3/UL
NRBC BLD AUTO-RTO: 0 /100
PH UR STRIP: 5 PH (ref 5–7)
PLATELET # BLD AUTO: 294 10E9/L (ref 150–450)
POTASSIUM SERPL-SCNC: 3.5 MMOL/L (ref 3.4–5.3)
PROT SERPL-MCNC: 7 G/DL (ref 6.8–8.8)
RBC # BLD AUTO: 4.18 10E12/L (ref 3.8–5.2)
RBC #/AREA URNS AUTO: <1 /HPF (ref 0–2)
SODIUM SERPL-SCNC: 139 MMOL/L (ref 133–144)
SOURCE: ABNORMAL
SP GR UR STRIP: 1.02 (ref 1–1.03)
SPECIMEN SOURCE: NORMAL
SQUAMOUS #/AREA URNS AUTO: 3 /HPF (ref 0–1)
UROBILINOGEN UR STRIP-MCNC: 0 MG/DL (ref 0–2)
WBC # BLD AUTO: 14.5 10E9/L (ref 4–11)
WBC #/AREA URNS AUTO: <1 /HPF (ref 0–5)

## 2019-09-02 PROCEDURE — 25800030 ZZH RX IP 258 OP 636: Performed by: FAMILY MEDICINE

## 2019-09-02 PROCEDURE — 74176 CT ABD & PELVIS W/O CONTRAST: CPT

## 2019-09-02 PROCEDURE — 99285 EMERGENCY DEPT VISIT HI MDM: CPT | Mod: 25 | Performed by: FAMILY MEDICINE

## 2019-09-02 PROCEDURE — 99285 EMERGENCY DEPT VISIT HI MDM: CPT | Mod: Z6 | Performed by: FAMILY MEDICINE

## 2019-09-02 PROCEDURE — 83690 ASSAY OF LIPASE: CPT | Performed by: FAMILY MEDICINE

## 2019-09-02 PROCEDURE — 80053 COMPREHEN METABOLIC PANEL: CPT | Performed by: FAMILY MEDICINE

## 2019-09-02 PROCEDURE — 81001 URINALYSIS AUTO W/SCOPE: CPT | Performed by: FAMILY MEDICINE

## 2019-09-02 PROCEDURE — 87880 STREP A ASSAY W/OPTIC: CPT | Performed by: FAMILY MEDICINE

## 2019-09-02 PROCEDURE — 96361 HYDRATE IV INFUSION ADD-ON: CPT | Performed by: FAMILY MEDICINE

## 2019-09-02 PROCEDURE — 86140 C-REACTIVE PROTEIN: CPT | Performed by: FAMILY MEDICINE

## 2019-09-02 PROCEDURE — 83605 ASSAY OF LACTIC ACID: CPT | Performed by: FAMILY MEDICINE

## 2019-09-02 PROCEDURE — 96374 THER/PROPH/DIAG INJ IV PUSH: CPT | Performed by: FAMILY MEDICINE

## 2019-09-02 PROCEDURE — 86308 HETEROPHILE ANTIBODY SCREEN: CPT | Performed by: FAMILY MEDICINE

## 2019-09-02 PROCEDURE — 96376 TX/PRO/DX INJ SAME DRUG ADON: CPT | Performed by: FAMILY MEDICINE

## 2019-09-02 PROCEDURE — 87081 CULTURE SCREEN ONLY: CPT | Mod: XS | Performed by: FAMILY MEDICINE

## 2019-09-02 PROCEDURE — 96375 TX/PRO/DX INJ NEW DRUG ADDON: CPT | Performed by: FAMILY MEDICINE

## 2019-09-02 PROCEDURE — 85025 COMPLETE CBC W/AUTO DIFF WBC: CPT | Performed by: FAMILY MEDICINE

## 2019-09-02 PROCEDURE — 87086 URINE CULTURE/COLONY COUNT: CPT | Performed by: FAMILY MEDICINE

## 2019-09-02 PROCEDURE — 71046 X-RAY EXAM CHEST 2 VIEWS: CPT

## 2019-09-02 PROCEDURE — 25000128 H RX IP 250 OP 636: Performed by: FAMILY MEDICINE

## 2019-09-02 RX ORDER — SODIUM CHLORIDE, SODIUM LACTATE, POTASSIUM CHLORIDE, CALCIUM CHLORIDE 600; 310; 30; 20 MG/100ML; MG/100ML; MG/100ML; MG/100ML
1000 INJECTION, SOLUTION INTRAVENOUS CONTINUOUS
Status: DISCONTINUED | OUTPATIENT
Start: 2019-09-02 | End: 2019-09-02 | Stop reason: HOSPADM

## 2019-09-02 RX ORDER — KETOROLAC TROMETHAMINE 15 MG/ML
10 INJECTION, SOLUTION INTRAMUSCULAR; INTRAVENOUS ONCE
Status: COMPLETED | OUTPATIENT
Start: 2019-09-02 | End: 2019-09-02

## 2019-09-02 RX ORDER — OXYCODONE HYDROCHLORIDE 5 MG/1
5 TABLET ORAL EVERY 6 HOURS PRN
Qty: 10 TABLET | Refills: 0 | Status: SHIPPED | OUTPATIENT
Start: 2019-09-02 | End: 2020-01-27

## 2019-09-02 RX ORDER — ONDANSETRON 2 MG/ML
4 INJECTION INTRAMUSCULAR; INTRAVENOUS
Status: COMPLETED | OUTPATIENT
Start: 2019-09-02 | End: 2019-09-02

## 2019-09-02 RX ORDER — PREDNISONE 5 MG/1
5 TABLET ORAL DAILY
Qty: 147 TABLET | Refills: 0 | Status: SHIPPED | OUTPATIENT
Start: 2019-09-02 | End: 2020-06-04

## 2019-09-02 RX ORDER — HYDROMORPHONE HYDROCHLORIDE 1 MG/ML
0.5 INJECTION, SOLUTION INTRAMUSCULAR; INTRAVENOUS; SUBCUTANEOUS
Status: COMPLETED | OUTPATIENT
Start: 2019-09-02 | End: 2019-09-02

## 2019-09-02 RX ORDER — HYDROMORPHONE HYDROCHLORIDE 1 MG/ML
0.5 INJECTION, SOLUTION INTRAMUSCULAR; INTRAVENOUS; SUBCUTANEOUS ONCE
Status: COMPLETED | OUTPATIENT
Start: 2019-09-02 | End: 2019-09-02

## 2019-09-02 RX ADMIN — HYDROMORPHONE HYDROCHLORIDE 0.5 MG: 1 INJECTION, SOLUTION INTRAMUSCULAR; INTRAVENOUS; SUBCUTANEOUS at 09:12

## 2019-09-02 RX ADMIN — HYDROMORPHONE HYDROCHLORIDE 0.5 MG: 1 INJECTION, SOLUTION INTRAMUSCULAR; INTRAVENOUS; SUBCUTANEOUS at 12:29

## 2019-09-02 RX ADMIN — ONDANSETRON 4 MG: 2 INJECTION INTRAMUSCULAR; INTRAVENOUS at 12:30

## 2019-09-02 RX ADMIN — KETOROLAC TROMETHAMINE 10 MG: 15 INJECTION, SOLUTION INTRAMUSCULAR; INTRAVENOUS at 13:38

## 2019-09-02 RX ADMIN — SODIUM CHLORIDE, POTASSIUM CHLORIDE, SODIUM LACTATE AND CALCIUM CHLORIDE 1000 ML: 600; 310; 30; 20 INJECTION, SOLUTION INTRAVENOUS at 09:12

## 2019-09-02 NOTE — DISCHARGE INSTRUCTIONS
Your care was discussed with the on-call rheumatologist at [facility] Dr. Baez as we discussed.  Recommendations for restarting prednisone at 30 mg and tapering gradually by 2.5 mg/week were reviewed at the time of your ER visit and a prescription for a month supply with this plan in place was given.  I have also given you a prescription for oxycodone for breakthrough pain if you need it.  These follow-up with your rheumatologist as planned and return to the emergency department if worse or changes.

## 2019-09-02 NOTE — ED PROVIDER NOTES
History     Chief Complaint   Patient presents with     Abdominal Pain     LLQ abd pain, colitis hx, arthritis pain, takering off prednisone, became ill with URI sx since Sat.     Cough     Arthritis     HPI  Doretha Fernandez is a 44 year old female, past medical history significant for chronic pain, functional bowel disease, melanoma, asthma, depression, anxiety, GERD, dysfunctional uterine bleeding, CVA, TIA, rheumatoid arthritis, presents to the emergency department with multisystem concerns including abdominal pain, joint pain, cough, and concerned that she may have contracted mono from her daughter.  History is obtained from the patient who tearfully describes that she is on a long taper of prednisone that she has been on for several years and over the last 1 to 2 weeks she has experienced an increase in her arthritis pain which involves her wrists elbows ankles knees and low back.  She also feels that her colitis is flaring up as well for a similar reason.  She has been started on Humira and has had 5 doses at this point with last week's dose.  She normally doctors with rheumatology at the [facility] and reports that she notified them she was not feeling well last week and blood work was ordered but she has not seen anyone in the [facility] system yet and follow-up on this.  The patient notes left upper quadrant abdominal pain which is sharp and stabbing occasionally associated with nausea but not presently.  No vomiting.  Bowel habits are more erratic than usual she will have days where she has 5 or 6 loose watery stools nonblack or bloody.  Some days she will not have a bowel movement and feels constipated.  Her appetite has been reduced however she has not lost weight.  She denied fever however identified chills and sweats this morning.  She denies urinary tract symptoms such as frequency, urgency, dysuria.  Over the last 2 or 3 days she is developed a sore throat and a cough, spastic.  She is concerned that she  might have mono as her daughter has it right now.      Allergies:  Allergies   Allergen Reactions     Bee Venom Swelling     Azithromycin Diarrhea     Erythromycin      Other reaction(s): GI intolerance, Vomiting     Fentanyl Other (See Comments)     sweating  sweating     Prochlorperazine Fatigue     Other reaction(s): Other (see comments)  Fatigue     Buspirone      Other reaction(s): GI intolerance  vomiting     Erythrocin Nausea and Vomiting     Zithromax [Azithromycin Dihydrate] Diarrhea     Enbrel [Etanercept] Hives and Rash       Problem List:    Patient Active Problem List    Diagnosis Date Noted     Immunosuppressed status (H) 09/05/2019     Priority: Medium     Ulcerative colitis with complication, unspecified location (H) 09/05/2019     Priority: Medium     Morbid obesity (H) 09/05/2019     Priority: Medium     Influenza-like illness 05/06/2018     Priority: Medium     Other chronic pain 05/06/2018     Priority: Medium     Rash and nonspecific skin eruption 04/05/2018     Priority: Medium     Bilateral leg cramps 03/07/2018     Priority: Medium     Intestinal giardiasis 03/05/2018     Priority: Medium     Rectal bleeding 03/04/2018     Priority: Medium     Diarrhea 03/04/2018     Priority: Medium     Colitis 03/01/2018     Priority: Medium     Functional diarrhea 02/22/2018     Priority: Medium     Melanoma (H) 10/23/2017     Priority: Medium     Malignant melanoma of left upper extremity including shoulder (H) 10/12/2017     Priority: Medium     Metastatic malignant melanoma (H) 10/10/2017     Priority: Medium     Prothrombin mutation (H) 04/05/2017     Priority: Medium     On daily aspirin 81 mg per hematology's recommendations from 2008       Vision changes 04/01/2017     Priority: Medium     Mild intermittent asthma without complication 11/08/2013     Priority: Medium     Mild major depression (H) 06/24/2013     Priority: Medium     Anxiety state 09/13/2012     Priority: Medium     Problem list name  updated by automated process. Provider to review       Esophageal reflux 2012     Priority: Medium     DUB (dysfunctional uterine bleeding) 2011     Priority: Medium     CARDIOVASCULAR SCREENING; LDL GOAL LESS THAN 160 2011     Priority: Low        Past Medical History:    Past Medical History:   Diagnosis Date     Abnormal MRI      Anxiety      Basal cell carcinoma      Depression      Lumbago      Malignant melanoma (H)      Mild persistent asthma      Prothrombin deficiency (H)      Stroke (cerebrum) (H)      TIA (transient ischemic attack)        Past Surgical History:    Past Surgical History:   Procedure Laterality Date     COLONOSCOPY N/A 10/18/2017    Procedure: COLONOSCOPY;  Colon;  Surgeon: Debbie Stephens MD;  Location: [facility] OR     COLONOSCOPY N/A 3/9/2018    Procedure: COMBINED COLONOSCOPY, SINGLE OR MULTIPLE BIOPSY/POLYPECTOMY BY BIOPSY;  colon  ;  Surgeon: Benita Schumacher MD;  Location: [facility] GI     DISSECT LYMPH NODE AXILLA Left 10/23/2017    Procedure: DISSECT LYMPH NODE AXILLA;  Left Axillary Lymph Node Dissection ;  Surgeon: Laurent Cool MD;  Location: [facility] OR     GYN SURGERY           REPAIR MOHS Left 2017    Procedure: REPAIR MOHS;  Left Upper Lid Moh's Reconstruction;  Surgeon: Kisha Bosch MD;  Location: [facility] OR       Family History:    Family History   Problem Relation Age of Onset     Cancer Mother 45        lung     Neurologic Disorder Mother      Lipids Father      Gastrointestinal Disease Father      Depression Father      Cancer Maternal Grandmother      Blood Disease Maternal Grandmother      Arthritis Maternal Grandmother      Diabetes Maternal Grandmother      Depression Maternal Grandmother      Macular Degeneration Maternal Grandmother      Glaucoma Maternal Grandmother      Diabetes Maternal Grandfather      Cerebrovascular Disease Maternal Grandfather      Blood Disease Maternal Grandfather      Heart Disease Maternal Grandfather       Glaucoma Maternal Grandfather      Cancer Paternal Grandmother      Cancer - colorectal Paternal Grandmother      Respiratory Paternal Grandfather      Blood Disease Paternal Grandfather      Heart Disease Daughter      Asthma Daughter      Depression Sister      Melanoma No family hx of        Social History:  Marital Status:   [4]  Social History     Tobacco Use     Smoking status: Passive Smoke Exposure - Never Smoker     Smokeless tobacco: Never Used   Substance Use Topics     Alcohol use: No     Comment: occ     Drug use: No        Medications:      adalimumab (HUMIRA *CF* PEN) 40 MG/0.4ML pen kit   Calcium Carbonate (CALCIUM-CARB 600 PO)   oxyCODONE (ROXICODONE) 5 MG tablet   predniSONE (DELTASONE) 20 MG tablet   predniSONE (DELTASONE) 5 MG tablet   pregabalin (LYRICA) 25 MG capsule   pregabalin (LYRICA) 75 MG capsule   venlafaxine (EFFEXOR-ER) 225 MG 24 hr tablet   VITAMIN D3 1000 units tablet   Acetaminophen (TYLENOL PO)   albuterol (PROAIR HFA/PROVENTIL HFA/VENTOLIN HFA) 108 (90 Base) MCG/ACT inhaler   doxycycline hyclate (VIBRA-TABS) 100 MG tablet   fluocinonide (LIDEX) 0.05 % external cream   ondansetron (ZOFRAN) 8 MG tablet   order for DME   order for DME   order for DME   order for DME   promethazine (PHENERGAN) 25 MG tablet         Review of Systems   All other systems reviewed and are negative.      Physical Exam   BP: (!) 146/72  Pulse: 119  Temp: 98  F (36.7  C)  Resp: 18  Weight: 108.9 kg (240 lb)  SpO2: 97 %      Physical Exam   Constitutional: She is oriented to person, place, and time. She appears well-developed and well-nourished. She appears distressed.   Tearful, emotionally upset, appears uncomfortable.  Non-ill and nontoxic   HENT:   Head: Normocephalic and atraumatic.   Oropharynx is red, no exudate.   Eyes: Pupils are equal, round, and reactive to light. EOM are normal.   Cardiovascular: Normal rate, regular rhythm, normal heart sounds and intact distal pulses.   Pulmonary/Chest:  Effort normal and breath sounds normal.   Abdominal:   Obese abdomen difficult to examine, tender mildly left upper quadrant distractible.  Voluntary guarding.  No rebound no referred pain.  No CVA tenderness.   Neurological: She is alert and oriented to person, place, and time.   Skin: Skin is warm and dry. Capillary refill takes less than 2 seconds.   Psychiatric: She has a normal mood and affect. Her behavior is normal.   Nursing note and vitals reviewed.      ED Course        Procedures               Critical Care time:  none               No results found for this or any previous visit (from the past 24 hour(s)).    Medications   lactated ringers BOLUS 1,000 mL (0 mLs Intravenous Stopped 9/2/19 1229)   HYDROmorphone (PF) (DILAUDID) injection 0.5 mg (0.5 mg Intravenous Given 9/2/19 0912)   ondansetron (ZOFRAN) injection 4 mg (4 mg Intravenous Given 9/2/19 1230)   HYDROmorphone (PF) (DILAUDID) injection 0.5 mg (0.5 mg Intravenous Given 9/2/19 1229)   ketorolac (TORADOL) injection 10 mg (10 mg Intravenous Given 9/2/19 1338)     2:09 PM  Minimal improvement with Dilaudid, agreed to try a small dose of IV Toradol for the patient although I would not consider putting her on NSAID medication given her past medical history and I discussed this with her.  The patient requested that I speak with her rheumatologist at [facility].  I paged her rheumatologist however they were not on-call and I spoke with the on-call rheumatologist Dr. Baze at the [facility].  Dr. Baez kindly reviewed the patient's chart during our conversation on her computer and recommended that the patient have an increase in her steroid back to the level that was previously essentially pain-free for her i.e. 30 mg once daily.  She recommended 30 mg daily x1 week and a slow taper down by 2.5 mg/week.  The patient has a follow-up appointment in approach 3 weeks with her regular rheumatologist.  I will give her a prescription for sufficient  corticosteroid for the next month so that there is a buffer/overlap should she fail to make the appointment for whatever reason.  I also gave the patient a small prescription for oxycodone should she require it for acute pain before the corticosteroid effectively kicks in for her.    Assessments & Plan (with Medical Decision Making)   Assessment and plan with medical decision making at the time stamps above.    Disclaimer: This note consists of symbols derived from keyboarding, dictation and/or voice recognition software. As a result, there may be errors in the script that have gone undetected. Please consider this when interpreting information found in this chart.      I have reviewed the nursing notes.    I have reviewed the findings, diagnosis, plan and need for follow up with the patient.          Discharge Medication List as of 9/2/2019  2:38 PM      START taking these medications    Details   oxyCODONE (ROXICODONE) 5 MG tablet Take 1 tablet (5 mg) by mouth every 6 hours as needed for severe pain, Disp-10 tablet, R-0, Local Print      !! predniSONE (DELTASONE) 5 MG tablet Take 1 tablet (5 mg) by mouth daily Prednisone taper:  30 mg p.o. daily x7 days.  27.5 mg p.o. daily x7 days.  25 mg p.o. daily x7 days.  22.5 mg p.o. daily x7 days., Disp-147 tablet, R-0, Local Print       !! - Potential duplicate medications found. Please discuss with provider.          Final diagnoses:   Chronic pain syndrome - Acute exacerbation.   Flare of rheumatoid arthritis (H)       9/2/2019   [facility] EMERGENCY DEPARTMENT     Ismeal Serrano MD  09/06/19 [id number]

## 2019-09-02 NOTE — ED NOTES
LLQ pain started this am hx of colitis-has had numerous bowel movements since Monday-diarrhea alternating with constipation-last bm yesterday-gail-also uri sx since Saturday-exposed to mono-also tapering off prednisone for her arthritis and has hand,arm,knee and feet pain-tearful

## 2019-09-02 NOTE — ED AVS SNAPSHOT
[facility] Emergency Department  [address] [address]  Phone:  [phone]  Fax:  [phone]                                    Doretha Fernandez   MRN: [id number]    Department:  [facility] Emergency Department   Date of Visit:  9/2/2019           After Visit Summary Signature Page    I have received my discharge instructions, and my questions have been answered. I have discussed any challenges I see with this plan with the nurse or doctor.    ..........................................................................................................................................  Patient/Patient Representative Signature      ..........................................................................................................................................  Patient Representative Print Name and Relationship to Patient    ..................................................               ................................................  Date                                   Time    ..........................................................................................................................................  Reviewed by Signature/Title    ...................................................              ..............................................  Date                                               Time          22EPIC Rev 08/18

## 2019-09-03 LAB
BACTERIA SPEC CULT: NORMAL
Lab: NORMAL
SPECIMEN SOURCE: NORMAL

## 2019-09-04 LAB
BACTERIA SPEC CULT: NORMAL
Lab: NORMAL
SPECIMEN SOURCE: NORMAL

## 2019-09-04 NOTE — RESULT ENCOUNTER NOTE
Final urine culture report is NEGATIVE per [facility] ED Lab Result protocol.    If NEGATIVE result, no change in treatment, per [facility] ED Lab Result protocol.

## 2019-09-04 NOTE — RESULT ENCOUNTER NOTE
Final Beta strep group A r/o culture is NEGATIVE for Group A streptococcus.    No treatment or change in treatment per [facility] Strep protocol.

## 2019-09-05 ENCOUNTER — OFFICE VISIT (OUTPATIENT)
Dept: FAMILY MEDICINE | Facility: CLINIC | Age: 44
End: 2019-09-05
Payer: COMMERCIAL

## 2019-09-05 VITALS
HEIGHT: 63 IN | OXYGEN SATURATION: 96 % | RESPIRATION RATE: 16 BRPM | WEIGHT: 250.4 LBS | SYSTOLIC BLOOD PRESSURE: 110 MMHG | BODY MASS INDEX: 44.37 KG/M2 | DIASTOLIC BLOOD PRESSURE: 74 MMHG | TEMPERATURE: 98.5 F | HEART RATE: 84 BPM

## 2019-09-05 DIAGNOSIS — F32.0 MILD MAJOR DEPRESSION (H): ICD-10-CM

## 2019-09-05 DIAGNOSIS — K51.919 ULCERATIVE COLITIS WITH COMPLICATION, UNSPECIFIED LOCATION (H): ICD-10-CM

## 2019-09-05 DIAGNOSIS — D68.52 PROTHROMBIN MUTATION (H): ICD-10-CM

## 2019-09-05 DIAGNOSIS — J20.9 ACUTE BRONCHITIS WITH COEXISTING CONDITION REQUIRING PROPHYLACTIC TREATMENT: Primary | ICD-10-CM

## 2019-09-05 DIAGNOSIS — D84.9 IMMUNOSUPPRESSED STATUS (H): ICD-10-CM

## 2019-09-05 DIAGNOSIS — E66.01 MORBID OBESITY (H): ICD-10-CM

## 2019-09-05 PROCEDURE — 99214 OFFICE O/P EST MOD 30 MIN: CPT | Performed by: FAMILY MEDICINE

## 2019-09-05 RX ORDER — ALBUTEROL SULFATE 90 UG/1
2 AEROSOL, METERED RESPIRATORY (INHALATION) 4 TIMES DAILY
Qty: 1 INHALER | Refills: 0 | Status: SHIPPED | OUTPATIENT
Start: 2019-09-05 | End: 2019-12-13

## 2019-09-05 RX ORDER — DOXYCYCLINE HYCLATE 100 MG
100 TABLET ORAL 2 TIMES DAILY
Qty: 20 TABLET | Refills: 0 | Status: SHIPPED | OUTPATIENT
Start: 2019-09-05 | End: 2019-11-15

## 2019-09-05 ASSESSMENT — PATIENT HEALTH QUESTIONNAIRE - PHQ9
5. POOR APPETITE OR OVEREATING: MORE THAN HALF THE DAYS
SUM OF ALL RESPONSES TO PHQ QUESTIONS 1-9: 10

## 2019-09-05 ASSESSMENT — ANXIETY QUESTIONNAIRES
3. WORRYING TOO MUCH ABOUT DIFFERENT THINGS: MORE THAN HALF THE DAYS
IF YOU CHECKED OFF ANY PROBLEMS ON THIS QUESTIONNAIRE, HOW DIFFICULT HAVE THESE PROBLEMS MADE IT FOR YOU TO DO YOUR WORK, TAKE CARE OF THINGS AT HOME, OR GET ALONG WITH OTHER PEOPLE: VERY DIFFICULT
5. BEING SO RESTLESS THAT IT IS HARD TO SIT STILL: SEVERAL DAYS
6. BECOMING EASILY ANNOYED OR IRRITABLE: MORE THAN HALF THE DAYS
7. FEELING AFRAID AS IF SOMETHING AWFUL MIGHT HAPPEN: MORE THAN HALF THE DAYS
2. NOT BEING ABLE TO STOP OR CONTROL WORRYING: MORE THAN HALF THE DAYS
GAD7 TOTAL SCORE: 13
1. FEELING NERVOUS, ANXIOUS, OR ON EDGE: MORE THAN HALF THE DAYS

## 2019-09-05 ASSESSMENT — MIFFLIN-ST. JEOR: SCORE: 1759.94

## 2019-09-05 NOTE — PROGRESS NOTES
"Subjective     Doretha Fernandez is a 44 year old female who presents to clinic today for the following health issues:    HPI   ED/UC Followup:    Facility:  [facility]  Date of visit: 9/2/2019  Reason for visit: Chronic pain syndrome , Flare of rheumatoid arthritis, viral infection  Current Status: worsening     Patient has a malignant melanoma (left axilla lymph node with no known primary) and unfortunately the first immunomodulator treatment they tried caused colitis and then diffuse reactive arthritis/arthropathy.  Eventually diagnosed with seronegative RA.  She has been working with rheumatology at [facility] and has been on high does prednisone for over a year now.      [facility] is tapering her off of Prednisone.  The Rheumatologist is the one who is tapering.  Had been at 10 mg for two weeks and \"wasn't able to walk\" so she went to the ER. She notes that every time she gets close to 10 mg on her taper, the symptoms really return with a vengeance. Was seen in the ER a few days ago,  They called [facility] and the prednisone was increased back to 30 mg.    Has a pain clinic referral on 9/11 at [facility] - will then be seeing Lewis Sandhu on 10/31.  Starting with [facility] first due to the time it will take to get into see Dr. Sandhu.  She has also been on Humira more recently and is immunosuppressed.      Has had URI symptoms for the past week.  Cough, congestion, facial pain, ear pain.  Kids have been sick. She admits that she has a low tolerance for the URI symptoms given the other pain she is in.  When in the ER during her flare of pain her WBC was 14k, CRP >60, both of these up slightly from previous values, however with the inflammation/arthritis, etc, it's hard to know if this bump was due to prednisone/weaning/inflammation or infection.    She is immunosuppressed, so it's reasonable to consider antibiotic in this case, earlier than I normally would in an immunocompetent patient.      Reviewed and updated as needed this visit by " "Provider           Objective    /74   Pulse 84   Temp 98.5  F (36.9  C) (Tympanic)   Resp 16   Ht 1.6 m (5' 3\")   Wt 113.6 kg (250 lb 6.4 oz)   SpO2 96%   BMI 44.36 kg/m    Body mass index is 44.36 kg/m .  Physical Exam   GENERAL: healthy, alert and in moderate distress, tearful, anxious.  Moon facies from the prednisone  EYES: Eyes grossly normal to inspection, PERRL and conjunctivae and sclerae normal  HENT: normal cephalic/atraumatic, both ears: clear effusion, erythematous and bulging membrane, nose and mouth without ulcers or lesions, oropharynx clear and oral mucous membranes moist  NECK: no adenopathy, no asymmetry, masses, or scars and thyroid normal to palpation  RESP: lungs clear to auscultation - no rales, rhonchi or wheezes  CV: regular rate and rhythm, normal S1 S2, no S3 or S4, no murmur, click or rub, no peripheral edema and peripheral pulses strong  ABDOMEN: soft, nontender, no hepatosplenomegaly, no masses and bowel sounds normal  SKIN: no suspicious lesions or rashes    Diagnostic Test Results:  Labs reviewed in Epic        Assessment & Plan       ICD-10-CM    1. Acute bronchitis with coexisting condition requiring prophylactic treatment J20.9 doxycycline hyclate (VIBRA-TABS) 100 MG tablet     albuterol (PROAIR HFA/PROVENTIL HFA/VENTOLIN HFA) 108 (90 Base) MCG/ACT inhaler   2. Immunosuppressed status (H) D89.9    3. Mild major depression (H) F32.0    4. Ulcerative colitis with complication, unspecified location (H) K51.919    5. Prothrombin mutation (H) D68.52    6. Morbid obesity (H) E66.01      As above, given her immunocompromised status, will treat with doxy for the bronchitis/sinusitis symptoms she's experiencing.  She understands that this could be viral, but she feels she is worsening.  Understands risks of getting side effects (diarrhea) with the antibiotic.    Will also have her start albuterol four times daily until the cough is improved.      Follow up with [facility] for the " pain/pain clinic and rheumatology as planned.  She has a new taper plan for the prednisone.      No follow-ups on file.    Anna Naidu MD  [facility]

## 2019-09-05 NOTE — LETTER
[facility]  [address]  [facility] [address]  [phone]        September 5, 2019    Regarding:  Doretha Fernandez  [address]  [place] [address]              To Whom It May Concern;        Doretha Fernandez was seen in clinic 9/5/2019 and can return to work on 9/9/2019.          Sincerely,        Anna Naidu MD

## 2019-09-06 ASSESSMENT — ANXIETY QUESTIONNAIRES: GAD7 TOTAL SCORE: 13

## 2019-09-06 ASSESSMENT — ASTHMA QUESTIONNAIRES: ACT_TOTALSCORE: 18

## 2019-09-12 ENCOUNTER — TELEPHONE (OUTPATIENT)
Dept: FAMILY MEDICINE | Facility: CLINIC | Age: 44
End: 2019-09-12

## 2019-09-12 DIAGNOSIS — T36.95XA ANTIBIOTIC-ASSOCIATED DIARRHEA: Primary | ICD-10-CM

## 2019-09-12 DIAGNOSIS — K52.1 ANTIBIOTIC-ASSOCIATED DIARRHEA: Primary | ICD-10-CM

## 2019-09-12 NOTE — TELEPHONE ENCOUNTER
"Patient states she started doxycyline for bronchitis on 9/5, then 4 days ago she started with diarrhea with stomach cramps and nausea. She has only been able to eat crackers she states. She did have a little red blood with the stools 2 days ago but not since. She denies any dark tarry stools. She states \"It feels like my gut is rotting and that my other meds are just going right through me. It feels like my Venlafaxine isn't even working.\"  She states she has a history of colitis and that Dr. Naidu knows her history. As far as the bronchitis goes she still feels like she has a cold in her chest and a non-productive cough but feels much better. She denies a fever at this time.  Thank you!    Trang Alatorre RN    "

## 2019-09-12 NOTE — TELEPHONE ENCOUNTER
Has done 7 days of the antibiotics.  If feeling better, I would probably stop.    Start a probiotic over the counter like Florajen 3 (available at our pharmacy), would take this daily for the next month.    Check C diff - this can be an unfortunate side effect/complication of antibiotics.  Has to  collection tools/sample container from clinic/lab and return it.     It's possible that with the diarrhea the venlafaxine isn't being absorbed well, unfortunately there isn't much I can do about this until diarrhea improves.    Anna Naidu M.D.

## 2019-09-12 NOTE — TELEPHONE ENCOUNTER
Reason for call:  Patient reporting a symptom    Symptom or request: Pt was started on Doxy 9/5, by Dr. Naidu and has been having diarrhea x 4 each day.  She has also had an upset stomach.  Please call patient and advise.      Duration (how long have symptoms been present):     Have you been treated for this before? No    Additional comments:     Phone Number patient can be reached at:  Work number on file:  [phone] (work)    Best Time:  any    Can we leave a detailed message on this number:  YES    Call taken on 9/12/2019 at 7:59 AM by Jena Quintero

## 2019-09-18 NOTE — ADDENDUM NOTE
Encounter addended by: Anna Graham, PT on: 9/18/2019 9:34 AM   Actions taken: Sign clinical note, Episode resolved

## 2019-09-18 NOTE — PROGRESS NOTES
Outpatient Physical Therapy Discharge Note     Patient: Doretha Fernandez  : 1975    Beginning/End Dates of Reporting Period:  8/15/2019 to 2019    Referring Provider: Dr. Anna Naidu MD    Therapy Diagnosis: LUE and L-breast lymphedema      Client Self Report: I feel more swollen today after the trip wore the sleeve on plane but has not worn since home from trip, got flexitouch machine working    Objective Measurements:  Objective Measure: girth  Details: L UE +4.1%, decreased L breast measurement     Outcome Measures (most recent score):   LLIS = 42 on date of initial evaluation; pt didn't return to clinic for discharge so unable to again complete LLIS    Goals:  Goal Identifier stg   Goal Description pt to have around the clock tolerance to LUE GCB for lymphedema reduction response   Target Date 19   Date Met  (hold GCB due to increased edema in neck)   Progress:     Goal Identifier stg   Goal Description pt to have in-home set-up and education completed for Flexitouch compression pump and use daily for decreased lymphedema and associated symptoms   Target Date 19   Date Met  08/15/19   Progress:     Goal Identifier ltg   Goal Description once appropriate, pt to be independent with donning, dofing and care of compression sleeve and glove for longterm lymphedema mangement for maintenance   Target Date 19   Date Met      Progress:     Goal Identifier ltg   Goal Description pt to be independent with managing LUE/LUQ lymphedema longterm via HEP, elevation, skin cares, MLD/pump use and compression garment use/wear   Target Date 19   Date Met      Progress:     Goal Identifier ltg   Goal Description pt to have at least 8 point improvement on LLIS due to decreased lymphedema in LUE/LUQ and decreased associated symptoms    Target Date 19   Date Met      Progress:     Progress Toward Goals:   Good progress being made toward goals. Pt wearing a new compression sleeve as well as  started using Flexitouch compression pump again. Last seen on 8/15/19 and then no-showed to next appt and hasn't scheduled additional appts.       Plan:  Discharge from therapy.    Discharge:    Reason for Discharge: Patient has failed to schedule further appointments.    Equipment Issued: H&N Flexitouch compression pieces (already had arm and trunk); received new compression sleeve from [facility]     Discharge Plan: Patient to continue home program.

## 2019-09-23 ENCOUNTER — TELEPHONE (OUTPATIENT)
Dept: FAMILY MEDICINE | Facility: CLINIC | Age: 44
End: 2019-09-23

## 2019-09-23 NOTE — TELEPHONE ENCOUNTER
Reason for call:  Patient reporting a symptom    Symptom or request: Not sleeping, agitated, high anxiety and depressed, crying all the time.    Duration (how long have symptoms been present): two weeks 9/9/19    Have you been treated for this before? Yes    Additional comments: Would like RN to call her back    Phone Number patient can be reached at:  [phone]  Best Time:  Any    Can we leave a detailed message on this number:  YES    Call taken on 9/23/2019 at 11:08 AM by Elisa Casas

## 2019-09-23 NOTE — TELEPHONE ENCOUNTER
S-(situation): Patient reports she needs to be seen to discuss her anxiety.    B-(background): Patient ran out of the medication and the pharmacy will give her 2 days.    A-(assessment): Patient states it started this summer. The patient reports it is getting worse. Patient reports she is agitated and crying more.  Patient denies any thoughts of hurting herself or others.     R-(recommendations): patient was scheduled with provider on 9/24/19.  Patient was advised to go to ER if symptoms worsen. Patient agrees with the plan.    Polly BAJWA RN

## 2019-09-24 ENCOUNTER — OFFICE VISIT (OUTPATIENT)
Dept: FAMILY MEDICINE | Facility: CLINIC | Age: 44
End: 2019-09-24
Payer: COMMERCIAL

## 2019-09-24 VITALS
HEART RATE: 100 BPM | HEIGHT: 63 IN | BODY MASS INDEX: 43.94 KG/M2 | WEIGHT: 248 LBS | RESPIRATION RATE: 16 BRPM | DIASTOLIC BLOOD PRESSURE: 70 MMHG | SYSTOLIC BLOOD PRESSURE: 110 MMHG | TEMPERATURE: 97.4 F | OXYGEN SATURATION: 96 %

## 2019-09-24 DIAGNOSIS — F32.0 MILD MAJOR DEPRESSION (H): Primary | ICD-10-CM

## 2019-09-24 DIAGNOSIS — Z23 NEED FOR PROPHYLACTIC VACCINATION AND INOCULATION AGAINST INFLUENZA: ICD-10-CM

## 2019-09-24 DIAGNOSIS — F41.1 ANXIETY STATE: ICD-10-CM

## 2019-09-24 PROCEDURE — 99214 OFFICE O/P EST MOD 30 MIN: CPT | Mod: 25 | Performed by: NURSE PRACTITIONER

## 2019-09-24 PROCEDURE — 90471 IMMUNIZATION ADMIN: CPT | Performed by: NURSE PRACTITIONER

## 2019-09-24 PROCEDURE — 90686 IIV4 VACC NO PRSV 0.5 ML IM: CPT | Performed by: NURSE PRACTITIONER

## 2019-09-24 RX ORDER — HYDROXYZINE HYDROCHLORIDE 25 MG/1
25 TABLET, FILM COATED ORAL EVERY 8 HOURS PRN
Qty: 20 TABLET | Refills: 0 | Status: SHIPPED | OUTPATIENT
Start: 2019-09-24 | End: 2019-10-21

## 2019-09-24 RX ORDER — VENLAFAXINE HYDROCHLORIDE 225 MG/1
225 TABLET, EXTENDED RELEASE ORAL DAILY
Qty: 90 EACH | Refills: 1 | Status: SHIPPED | OUTPATIENT
Start: 2019-09-24 | End: 2020-03-18

## 2019-09-24 ASSESSMENT — MIFFLIN-ST. JEOR: SCORE: 1741.11

## 2019-09-24 NOTE — PROGRESS NOTES
"Subjective     Doretha Fernandez is a 44 year old female who presents to clinic today for the following health issues:    HPI   Anxiety Follow-Up    How are you doing with your anxiety since your last visit? Worsened     Are you having other symptoms that might be associated with anxiety? Yes:  agitated and crying and depressed.    Have you had a significant life event? Relationship Concerns     Are you feeling depressed? Yes:  .    Do you have any concerns with your use of alcohol or other drugs? No    Social History     Tobacco Use     Smoking status: Passive Smoke Exposure - Never Smoker     Smokeless tobacco: Never Used   Substance Use Topics     Alcohol use: No     Comment: occ     Drug use: No     SANDRA-7 SCORE 4/4/2018 11/14/2018 9/5/2019   Total Score - - -   Total Score 14 13 13     PHQ 4/4/2018 11/14/2018 9/5/2019   PHQ-9 Total Score 16 11 10   Q9: Thoughts of better off dead/self-harm past 2 weeks Not at all Not at all Not at all             How many servings of fruits and vegetables do you eat daily?  2-3    On average, how many sweetened beverages do you drink each day (soda, juice, sweet tea, etc)?   1  How many days per week do you miss taking your medication? 1    What makes it hard for you to take your medications?  needs a refill.      She absolutely denies any suicidal thoughts or plans. She is very stressed out at present. She has issues with her children.  She needs refills of her Effexor and something else to help her manage her heightened anxiety. She reports seeing psychiatry at [facility] and has tried \"so many medications\"        Patient Active Problem List   Diagnosis     CARDIOVASCULAR SCREENING; LDL GOAL LESS THAN 160     DUB (dysfunctional uterine bleeding)     Anxiety state     Esophageal reflux     Mild major depression (H)     Mild intermittent asthma without complication     Vision changes     Prothrombin mutation (H)     Metastatic malignant melanoma (H)     Malignant melanoma of left upper " extremity including shoulder (H)     Melanoma (H)     Functional diarrhea     Colitis     Rectal bleeding     Diarrhea     Intestinal giardiasis     Bilateral leg cramps     Rash and nonspecific skin eruption     Influenza-like illness     Other chronic pain     Immunosuppressed status (H)     Ulcerative colitis with complication, unspecified location (H)     Morbid obesity (H)     Past Surgical History:   Procedure Laterality Date     COLONOSCOPY N/A 10/18/2017    Procedure: COLONOSCOPY;  Colon;  Surgeon: Debbie Stephens MD;  Location: [facility]     COLONOSCOPY N/A 3/9/2018    Procedure: COMBINED COLONOSCOPY, SINGLE OR MULTIPLE BIOPSY/POLYPECTOMY BY BIOPSY;  colon  ;  Surgeon: Benita Schumacher MD;  Location: [facility] GI     DISSECT LYMPH NODE AXILLA Left 10/23/2017    Procedure: DISSECT LYMPH NODE AXILLA;  Left Axillary Lymph Node Dissection ;  Surgeon: Laurent Cool MD;  Location: [facility] OR     GYN SURGERY           REPAIR MOHS Left 2017    Procedure: REPAIR MOHS;  Left Upper Lid Moh's Reconstruction;  Surgeon: Kisha Bosch MD;  Location: [facility] OR       Social History     Tobacco Use     Smoking status: Passive Smoke Exposure - Never Smoker     Smokeless tobacco: Never Used   Substance Use Topics     Alcohol use: No     Comment: occ     Family History   Problem Relation Age of Onset     Cancer Mother 45        lung     Neurologic Disorder Mother      Lipids Father      Gastrointestinal Disease Father      Depression Father      Cancer Maternal Grandmother      Blood Disease Maternal Grandmother      Arthritis Maternal Grandmother      Diabetes Maternal Grandmother      Depression Maternal Grandmother      Macular Degeneration Maternal Grandmother      Glaucoma Maternal Grandmother      Diabetes Maternal Grandfather      Cerebrovascular Disease Maternal Grandfather      Blood Disease Maternal Grandfather      Heart Disease Maternal Grandfather      Glaucoma Maternal Grandfather      Cancer Paternal Grandmother       Cancer - colorectal Paternal Grandmother      Respiratory Paternal Grandfather      Blood Disease Paternal Grandfather      Heart Disease Daughter      Asthma Daughter      Depression Sister      Melanoma No family hx of          Current Outpatient Medications   Medication Sig Dispense Refill     Acetaminophen (TYLENOL PO) Take 1,000 mg by mouth every 8 hours as needed for mild pain or fever       adalimumab (HUMIRA *CF* PEN) 40 MG/0.4ML pen kit Inject 40 mg Subcutaneous every 14 days        albuterol (PROAIR HFA/PROVENTIL HFA/VENTOLIN HFA) 108 (90 Base) MCG/ACT inhaler Inhale 2 puffs into the lungs 4 times daily 1 Inhaler 0     Calcium Carbonate (CALCIUM-CARB 600 PO) Take 1 tablet by mouth every morning        hydrOXYzine (ATARAX) 25 MG tablet Take 1 tablet (25 mg) by mouth every 8 hours as needed for anxiety 20 tablet 0     ondansetron (ZOFRAN) 8 MG tablet Take by mouth every 8 hours as needed for nausea       predniSONE (DELTASONE) 20 MG tablet 10 mg daily; further taper instructions to follow on 11/26       predniSONE (DELTASONE) 5 MG tablet Take 1 tablet (5 mg) by mouth daily Prednisone taper:  30 mg p.o. daily x7 days.  27.5 mg p.o. daily x7 days.  25 mg p.o. daily x7 days.  22.5 mg p.o. daily x7 days. 147 tablet 0     pregabalin (LYRICA) 25 MG capsule Take 1 capsule (25 mg) by mouth 2 times daily Add to 75 mg for a total of 100 twice daily 180 capsule 3     pregabalin (LYRICA) 75 MG capsule TAKE ONE CAPSULE BY MOUTH TWICE A DAY 60 capsule 1     venlafaxine (EFFEXOR-ER) 225 MG 24 hr tablet Take 1 tablet (225 mg) by mouth daily 90 each 1     VITAMIN D3 1000 units tablet TAKE THREE TABLETS BY MOUTH EVERY  tablet 1     fluocinonide (LIDEX) 0.05 % external cream Apply sparingly to affected area twice daily as needed.  Do not apply to face. (Patient not taking: Reported on 7/16/2019) 120 g 3     order for DME Equipment being ordered: X2 Wearease Compression shirt. (Patient not taking: Reported on  "7/16/2019) 2 each 1     order for DME Equipment being ordered: 20-30 mmHg compression sleeve and 20-30 mmHg compression glove x 2 pair. (Patient not taking: Reported on 7/16/2019) 2 each 1     order for DME Equipment being ordered: x2 Wearease compression shirt (Patient not taking: Reported on 7/16/2019) 1 each 0     order for DME Equipment being ordered: 20-30mmHg compression sleeve and 20-30mmHg compression glove\" x2 pair (Patient not taking: Reported on 7/16/2019) 1 Units 0     oxyCODONE (ROXICODONE) 5 MG tablet Take 1 tablet (5 mg) by mouth every 6 hours as needed for severe pain (Patient not taking: Reported on 9/5/2019) 10 tablet 0     promethazine (PHENERGAN) 25 MG tablet Take 1 tablet (25 mg) by mouth every 4 hours as needed for nausea or vomiting (Patient not taking: Reported on 7/16/2019) 56 tablet 0     Allergies   Allergen Reactions     Bee Venom Swelling     Azithromycin Diarrhea     Erythromycin      Other reaction(s): GI intolerance, Vomiting     Fentanyl Other (See Comments)     sweating  sweating     Prochlorperazine Fatigue     Other reaction(s): Other (see comments)  Fatigue     Buspirone      Other reaction(s): GI intolerance  vomiting     Erythrocin Nausea and Vomiting     Zithromax [Azithromycin Dihydrate] Diarrhea     Enbrel [Etanercept] Hives and Rash     BP Readings from Last 3 Encounters:   09/24/19 110/70   09/05/19 110/74   09/02/19 (!) 153/86    Wt Readings from Last 3 Encounters:   09/24/19 112.5 kg (248 lb)   09/05/19 113.6 kg (250 lb 6.4 oz)   09/02/19 108.9 kg (240 lb)                      Reviewed and updated as needed this visit by Provider         Review of Systems   ROS COMP: Constitutional, HEENT, cardiovascular, pulmonary, gi and gu systems are negative, except as otherwise noted.      Objective    /70 (BP Location: Right arm, Patient Position: Chair, Cuff Size: Adult Large)   Pulse 100   Temp 97.4  F (36.3  C) (Oral)   Resp 16   Ht 1.588 m (5' 2.5\")   Wt 112.5 kg " "(248 lb)   LMP 09/03/2019 (Approximate)   SpO2 96%   BMI 44.64 kg/m    Body mass index is 44.64 kg/m .  Physical Exam   GENERAL: healthy, alert and no distress  NECK: no asymmetry  RESP: lungs clear   CV: regular rate and rhythm  MS: no gross musculoskeletal defects noted  PSYCH: very tearful, upset, crying a lot      Diagnostic Test Results:  Labs reviewed in Epic  No results found for this or any previous visit (from the past 24 hour(s)).        Assessment & Plan     1. Mild major depression (H)    - venlafaxine (EFFEXOR-ER) 225 MG 24 hr tablet; Take 1 tablet (225 mg) by mouth daily  Dispense: 90 each; Refill: 1  - MENTAL HEALTH REFERRAL  - Adult; Outpatient Treatment; Individual/Couples/Family/Group Therapy/Health Psychology; [facility]: [facility] [phone]; We will contact you to schedule the appointment or please call with any questions  She refused to see Chelsea tomorrow, states she will be \"fine\" Will add atarax and continue with Effexor. Referral placed for our counseling and she reports she will continue to see [facility].  2. Anxiety state    - venlafaxine (EFFEXOR-ER) 225 MG 24 hr tablet; Take 1 tablet (225 mg) by mouth daily  Dispense: 90 each; Refill: 1  - MENTAL HEALTH REFERRAL  - Adult; Outpatient Treatment; Individual/Couples/Family/Group Therapy/Health Psychology; [facility]: [facility] [phone]; We will contact you to schedule the appointment or please call with any questions  - hydrOXYzine (ATARAX) 25 MG tablet; Take 1 tablet (25 mg) by mouth every 8 hours as needed for anxiety  Dispense: 20 tablet; Refill: 0    3. Need for prophylactic vaccination and inoculation against influenza    - INFLUENZA VACCINE IM > 6 MONTHS VALENT IIV4 [44063]  - Vaccine Administration, Initial [83017]     BMI:   Estimated body mass index is 44.64 kg/m  as calculated from the following:    Height as of this encounter: 1.588 m (5' 2.5\").    Weight as of this encounter: 112.5 kg (248 " otilio).           See Patient Instructions  Patient Instructions   Refills of Effexor sent, conitnue with that.  Referral placed for counseling.  Try the Atarax as needed for anxiety and sleep.  Flu shot given.  Follow up at [facility] as scheduled.        Our Clinic hours are:  Mondays    7:20 am - 7 pm  Tues -  Fri  7:20 am - 5 pm    Clinic Phone: [phone]    The clinic lab opens at 7:30 am Mon - Fri and appointments are required.    [facility] [place]  Ph. [phone]  Monday  8 am - 7pm  Tues - Fri 8 am - 5:30 pm             Return in about 4 weeks (around 10/22/2019) for or sooner if symptoms persist or worsen, Med Check.    AMADA Johnson CNP  [facility]

## 2019-09-24 NOTE — PATIENT INSTRUCTIONS
Refills of Effexor sent, conitnue with that.  Referral placed for counseling.  Try the Atarax as needed for anxiety and sleep.  Flu shot given.  Follow up at [facility] as scheduled.        Our Clinic hours are:  Mondays    7:20 am - 7 pm  Tues -  Fri  7:20 am - 5 pm    Clinic Phone: [phone]    The clinic lab opens at 7:30 am Mon - Fri and appointments are required.    [facility] [place]  Ph. [phone]  Monday  8 am - 7pm  Tues - Fri 8 am - 5:30 pm

## 2019-10-21 DIAGNOSIS — F41.1 ANXIETY STATE: ICD-10-CM

## 2019-10-21 NOTE — TELEPHONE ENCOUNTER
"Requested Prescriptions   Pending Prescriptions Disp Refills     hydrOXYzine (ATARAX) 25 MG tablet [Pharmacy Med Name: hydrOXYzine HCL 25MG TAB] 20 tablet 0     Sig: TAKE ONE TABLET BY MOUTH EVERY 8 HOURS AS NEEDED FOR ANXIETY       Antihistamines Protocol Passed - 10/21/2019 12:50 PM        Passed - Recent (12 mo) or future (30 days) visit within the authorizing provider's specialty     Patient has had an office visit with the authorizing provider or a provider within the authorizing providers department within the previous 12 mos or has a future within next 30 days. See \"Patient Info\" tab in inbasket, or \"Choose Columns\" in Meds & Orders section of the refill encounter.              Passed - Patient is age 3 or older     Apply age and/or weight-based dosing for peds patients age 3 and older.    Forward request to provider for patients under the age of 3.          Passed - Medication is active on med list        Last Written Prescription Date:  9/24/20119  Last Fill Quantity: 20,  # refills: 0   Last office visit: 9/24/2019 with prescribing provider:  Yamil   Future Office Visit:      "

## 2019-10-23 RX ORDER — HYDROXYZINE HYDROCHLORIDE 25 MG/1
TABLET, FILM COATED ORAL
Qty: 30 TABLET | Refills: 0 | Status: SHIPPED | OUTPATIENT
Start: 2019-10-23 | End: 2019-11-15

## 2019-10-24 ENCOUNTER — TELEPHONE (OUTPATIENT)
Dept: DERMATOLOGY | Facility: CLINIC | Age: 44
End: 2019-10-24

## 2019-10-24 NOTE — TELEPHONE ENCOUNTER
Reason for Call:  Other     Detailed comments: Pt states she is seen every 2 months for metastatic melanoma. Was supposed to be seen in September for skin check but forgot to make the appt - No appts available until December - Please advise    Phone Number Patient can be reached at: [phone] (work - Ask for Doretha MADHU)    Best Time: Any    Can we leave a detailed message on this number? NO    Call taken on 10/24/2019 at 8:59 AM by Denise Behrendt

## 2019-10-30 ENCOUNTER — OFFICE VISIT (OUTPATIENT)
Dept: DERMATOLOGY | Facility: CLINIC | Age: 44
End: 2019-10-30
Payer: COMMERCIAL

## 2019-10-30 VITALS — SYSTOLIC BLOOD PRESSURE: 119 MMHG | HEART RATE: 90 BPM | DIASTOLIC BLOOD PRESSURE: 76 MMHG

## 2019-10-30 DIAGNOSIS — Z85.820 HISTORY OF MELANOMA: ICD-10-CM

## 2019-10-30 DIAGNOSIS — Z85.828 HISTORY OF BASAL CELL CANCER: ICD-10-CM

## 2019-10-30 DIAGNOSIS — D18.01 ANGIOMA OF SKIN: ICD-10-CM

## 2019-10-30 DIAGNOSIS — D23.9 DERMAL NEVUS: ICD-10-CM

## 2019-10-30 DIAGNOSIS — L81.4 LENTIGO: ICD-10-CM

## 2019-10-30 DIAGNOSIS — L82.1 SEBORRHEIC KERATOSIS: Primary | ICD-10-CM

## 2019-10-30 PROCEDURE — 99213 OFFICE O/P EST LOW 20 MIN: CPT | Performed by: DERMATOLOGY

## 2019-10-30 NOTE — LETTER
10/30/2019         RE: Doretha Fernandez  [address] [facility]  [place] [address]        Dear Colleague,    Thank you for referring your patient, Doretha Fernandez, to the [facility]. Please see a copy of my visit note below.    Doretha Fernandez is a 44 year old year old female patient here today for f/u h xof melanoma unknown primary.  She went to Dr. Cool for node dissection.1 out of 20 nodes positive.  No adjuvant Radiation.  of brain and PET scan both clear.  Was on  Nivolumab got 8 injection but got colitis.  She went to [place].   She was treated with high dose steroids for colitis and had a dx of bacterial pneumonia.   She was admitted for to hospital for this. She is followed by GI She is off of melanoma meds.  She was also dx with seronegative rheumatoid arthritis and is on humira which is not helping,  She is going to pain clinic now.  BAck on fpred and has edema again.  She denies any new or changing skin lesions.  Patient reports the following modifying factors none.  Associated symptoms: none.  Patient has no other skin complaints today.  Remainder of the HPI, Meds, PMH, Allergies, FH, and SH was reviewed in chart. Current PET-CT scan, physical exam and laboratory tests demonstrate no evidence of melanoma recurrence.      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      Anxiety      Basal cell carcinoma      Depression      Lumbago     left lower back pain     Malignant melanoma (H)      Mild persistent asthma      Prothrombin deficiency (H)     takes 81mg asa daily     Stroke (cerebrum) (H)     During      TIA (transient ischemic attack)        Past Surgical History:   Procedure Laterality Date     COLONOSCOPY N/A 10/18/2017    Procedure: COLONOSCOPY;  Colon;  Surgeon: Debbie Stephens MD;  [facility]     COLONOSCOPY N/A 3/9/2018    Procedure: COMBINED COLONOSCOPY, SINGLE OR MULTIPLE BIOPSY/POLYPECTOMY  BY BIOPSY;  colon  ;  Surgeon: Benita Schumacher MD;  [facility]: [facility]     DISSECT LYMPH NODE AXILLA Left 10/23/2017    Procedure: DISSECT LYMPH NODE AXILLA;  Left Axillary Lymph Node Dissection ;  Surgeon: Laurent Cool MD;  [facility]     GYN SURGERY           REPAIR MOHS Left 2017    Procedure: REPAIR MOHS;  Left Upper Lid Moh's Reconstruction;  Surgeon: Kisha Bosch MD;  [facility]        Family History   Problem Relation Age of Onset     Cancer Mother 45        lung     Neurologic Disorder Mother      Lipids Father      Gastrointestinal Disease Father      Depression Father      Cancer Maternal Grandmother      Blood Disease Maternal Grandmother      Arthritis Maternal Grandmother      Diabetes Maternal Grandmother      Depression Maternal Grandmother      Macular Degeneration Maternal Grandmother      Glaucoma Maternal Grandmother      Diabetes Maternal Grandfather      Cerebrovascular Disease Maternal Grandfather      Blood Disease Maternal Grandfather      Heart Disease Maternal Grandfather      Glaucoma Maternal Grandfather      Cancer Paternal Grandmother      Cancer - colorectal Paternal Grandmother      Respiratory Paternal Grandfather      Blood Disease Paternal Grandfather      Heart Disease Daughter      Asthma Daughter      Depression Sister      Melanoma No family hx of        Social History     Socioeconomic History     Marital status:      Spouse name: Not on file     Number of children: Not on file     Years of education: Not on file     Highest education level: Not on file   Occupational History     Not on file   Social Needs     Financial resource strain: Not on file     Food insecurity:     Worry: Not on file     Inability: Not on file     Transportation needs:     Medical: Not on file     Non-medical: Not on file   Tobacco Use     Smoking status: Passive Smoke Exposure - Never Smoker     Smokeless tobacco: Never Used   Substance and Sexual Activity      Alcohol use: No     Comment: occ     Drug use: No     Sexual activity: Not Currently     Partners: Male     Birth control/protection: Surgical   Lifestyle     Physical activity:     Days per week: Not on file     Minutes per session: Not on file     Stress: Not on file   Relationships     Social connections:     Talks on phone: Not on file     Gets together: Not on file     Attends [employer] service: Not on file     Active member of club or organization: Not on file     Attends meetings of clubs or organizations: Not on file     Relationship status: Not on file     Intimate partner violence:     Fear of current or ex partner: Not on file     Emotionally abused: Not on file     Physically abused: Not on file     Forced sexual activity: Not on file   Other Topics Concern     Parent/sibling w/ CABG, MI or angioplasty before 65F 55M? No   Social History Narrative    20 y.o- patient's mother   of lung cancer. She had to take care of her younger sister.    2012- patient's  had a heart attack with stents placed, followed by cardiac rehabilitation    2000 TO 2012  was in [facility] for depression    2013 patient's  went through alcohol rehabilitation at [facility]            They have attended couple counseling a couple of times and patient went to the family program for chemical dependency.    Patient denies alcohol or drug use and herself            Has 2 children, girls ages 10 and 13     For a while she was working 3 jobs since her  was ill. Works at the [employer] for [employer]. Reports her job is very stressful.           Outpatient Encounter Medications as of 10/30/2019   Medication Sig Dispense Refill     Acetaminophen (TYLENOL PO) Take 1,000 mg by mouth every 8 hours as needed for mild pain or fever       adalimumab (HUMIRA *CF* PEN) 40 MG/0.4ML pen kit Inject 40 mg Subcutaneous every 14 days         "albuterol (PROAIR HFA/PROVENTIL HFA/VENTOLIN HFA) 108 (90 Base) MCG/ACT inhaler Inhale 2 puffs into the lungs 4 times daily 1 Inhaler 0     Calcium Carbonate (CALCIUM-CARB 600 PO) Take 1 tablet by mouth every morning        [] doxycycline hyclate (VIBRA-TABS) 100 MG tablet Take 1 tablet (100 mg) by mouth 2 times daily for 10 days 20 tablet 0     fluocinonide (LIDEX) 0.05 % external cream Apply sparingly to affected area twice daily as needed.  Do not apply to face. (Patient not taking: Reported on 2019) 120 g 3     hydrOXYzine (ATARAX) 25 MG tablet TAKE ONE TABLET BY MOUTH EVERY 8 HOURS AS NEEDED FOR ANXIETY 30 tablet 0     [] MISC NATURAL PRODUCTS PO        ondansetron (ZOFRAN) 8 MG tablet Take by mouth every 8 hours as needed for nausea       order for DME Equipment being ordered: X2 Wearease Compression shirt. (Patient not taking: Reported on 2019) 2 each 1     order for DME Equipment being ordered: 20-30 mmHg compression sleeve and 20-30 mmHg compression glove x 2 pair. (Patient not taking: Reported on 2019) 2 each 1     order for DME Equipment being ordered: x2 Wearease compression shirt (Patient not taking: Reported on 2019) 1 each 0     order for DME Equipment being ordered: 20-30mmHg compression sleeve and 20-30mmHg compression glove\" x2 pair (Patient not taking: Reported on 2019) 1 Units 0     oxyCODONE (ROXICODONE) 5 MG tablet Take 1 tablet (5 mg) by mouth every 6 hours as needed for severe pain (Patient not taking: Reported on 2019) 10 tablet 0     predniSONE (DELTASONE) 20 MG tablet 10 mg daily; further taper instructions to follow on        predniSONE (DELTASONE) 5 MG tablet Take 1 tablet (5 mg) by mouth daily Prednisone taper:  30 mg p.o. daily x7 days.  27.5 mg p.o. daily x7 days.  25 mg p.o. daily x7 days.  22.5 mg p.o. daily x7 days. 147 tablet 0     pregabalin (LYRICA) 25 MG capsule Take 1 capsule (25 mg) by mouth 2 times daily Add to 75 mg for a " total of 100 twice daily 180 capsule 3     pregabalin (LYRICA) 75 MG capsule TAKE ONE CAPSULE BY MOUTH TWICE A DAY 60 capsule 1     promethazine (PHENERGAN) 25 MG tablet Take 1 tablet (25 mg) by mouth every 4 hours as needed for nausea or vomiting (Patient not taking: Reported on 7/16/2019) 56 tablet 0     venlafaxine (EFFEXOR-ER) 225 MG 24 hr tablet Take 1 tablet (225 mg) by mouth daily 90 each 1     VITAMIN D3 1000 units tablet TAKE THREE TABLETS BY MOUTH EVERY  tablet 1     Facility-Administered Encounter Medications as of 10/30/2019   Medication Dose Route Frequency Provider Last Rate Last Dose     lidocaine 1 % 9 mL  9 mL Intradermal Once Anna Naidu MD         sodium bicarbonate 8.4 % injection 1 mEq  1 mEq Intradermal Once Anna Naidu MD                 Review Of Systems  Skin: As above  Eyes: negative  Ears/Nose/Throat: negative  Respiratory: No shortness of breath, dyspnea on exertion, cough, or hemoptysis  Cardiovascular: negative  Gastrointestinal: negative  Genitourinary: negative  Musculoskeletal: negative  Neurologic: negative  Psychiatric: negative  Hematologic/Lymphatic/Immunologic: negative  Endocrine: negative      O:   NAD, WDWN, Alert & Oriented, Mood & Affect wnl, Vitals stable   Here today alone   /76   Pulse 90    General appearance normal   Vitals stable   Alert, oriented and in no acute distress        Following lymph nodes palpated: Occipital, Cervical, Supraclavicular , axilla, inguinal, femoral no lad                                         Stuck on papules and brown macules on trunk and ext                                          Pink papules on trunk                           Red papules on trunk              The remainder of the full exam was unremarkable; the following areas were examined:  conjunctiva/lids, oral mucosa, neck, peripheral vascular system, abdomen, lymph nodes, digits/nails, eccrine and apocrine glands, scalp/hair, face, neck, chest, abdomen,  buttocks, back, RUE, LUE, RLE, LLE                                                         Eyes: Conjunctivae/lids:Normal                                                     ENT: Lips, buccal mucosa, tongue: normal                                                    MSK:Normal                                                    Cardiovascular: peripheral edema none                                                    Pulm: Breathing Normal                                                    Lymph Nodes: No Head and Neck Lymphadenopathy                                                     Neuro/Psych: Orientation:Normal; Mood/Affect:Normal          A/P:  1. Metastatic melanoma, seborrheic keratosis, lentigo, dermal nevi, angioma, hx of basal cell carcinoma         MELANOMA DISCUSSED WITH PATIENT:  I discussed the specifics of tumor, prognosis, metachronous melanoma, self exam, and genetics with the patient. I explained the need for monthly skin exams including and taught the patient how to do this. Patient was asked about new or changing moles and a full skin exam was performed.   BENIGN LESIONS DISCUSSED WITH PATIENT:  I discussed the specifics of tumor, prognosis, and genetics of benign lesions.  I explained that treatment of these lesions would be purely cosmetic and not medically neccessary.  I discussed with patient different removal options including excision, cautery and /or laser.        Nature and genetics of benign skin lesions dicussed with patient.  Signs and Symptoms of skin cancer discussed with patient.  ABCDEs of melanoma reviewed with patient.  Patient encouraged to perform monthly skin exams.  UV precautions reviewed with patient.  Skin care regimen reviewed with patient: Eliminate harsh soaps, i.e. Dial, zest, irsih spring; Mild soaps such as Cetaphil or Dove sensitive skin, avoid hot or cold showers, aggressive use of emollients including vanicream, cetaphil or cerave discussed with patient.    Risks of  non-melanoma skin cancer discussed with patient   Return to clinic 3 months    Again, thank you for allowing me to participate in the care of your patient.        Sincerely,        Lowell Bullock MD

## 2019-10-30 NOTE — PROGRESS NOTES
Doretha Fernandez is a 44 year old year old female patient here today for f/u h xof melanoma unknown primary.  She went to Dr. Cool for node dissection.1 out of 20 nodes positive.  No adjuvant Radiation.  of brain and PET scan both clear.  Was on  Nivolumab got 8 injection but got colitis.  She went to [place].   She was treated with high dose steroids for colitis and had a dx of bacterial pneumonia.   She was admitted for to hospital for this. She is followed by GI She is off of melanoma meds.  She was also dx with seronegative rheumatoid arthritis and is on humira which is not helping,  She is going to pain clinic now.  BAck on fpred and has edema again.  She denies any new or changing skin lesions.  Patient reports the following modifying factors none.  Associated symptoms: none.  Patient has no other skin complaints today.  Remainder of the HPI, Meds, PMH, Allergies, FH, and SH was reviewed in chart. Current PET-CT scan, physical exam and laboratory tests demonstrate no evidence of melanoma recurrence.      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      Anxiety      Basal cell carcinoma      Depression      Lumbago     left lower back pain     Malignant melanoma (H)      Mild persistent asthma      Prothrombin deficiency (H)     takes 81mg asa daily     Stroke (cerebrum) (H)     During      TIA (transient ischemic attack)        Past Surgical History:   Procedure Laterality Date     COLONOSCOPY N/A 10/18/2017    Procedure: COLONOSCOPY;  Colon;  Surgeon: Debbie Stephens MD;  [facility]     COLONOSCOPY N/A 3/9/2018    Procedure: COMBINED COLONOSCOPY, SINGLE OR MULTIPLE BIOPSY/POLYPECTOMY BY BIOPSY;  colon  ;  Surgeon: Benita Schumacher MD;  [facility]     DISSECT LYMPH NODE AXILLA Left 10/23/2017    Procedure: DISSECT LYMPH NODE AXILLA;  Left Axillary Lymph Node Dissection ;  Surgeon: Laurent Cool MD;  [facility]      GYN SURGERY           REPAIR MOHS Left 2017    Procedure: REPAIR MOHS;  Left Upper Lid Moh's Reconstruction;  Surgeon: Kisha Bosch MD;  Location:  OR        Family History   Problem Relation Age of Onset     Cancer Mother 45        lung     Neurologic Disorder Mother      Lipids Father      Gastrointestinal Disease Father      Depression Father      Cancer Maternal Grandmother      Blood Disease Maternal Grandmother      Arthritis Maternal Grandmother      Diabetes Maternal Grandmother      Depression Maternal Grandmother      Macular Degeneration Maternal Grandmother      Glaucoma Maternal Grandmother      Diabetes Maternal Grandfather      Cerebrovascular Disease Maternal Grandfather      Blood Disease Maternal Grandfather      Heart Disease Maternal Grandfather      Glaucoma Maternal Grandfather      Cancer Paternal Grandmother      Cancer - colorectal Paternal Grandmother      Respiratory Paternal Grandfather      Blood Disease Paternal Grandfather      Heart Disease Daughter      Asthma Daughter      Depression Sister      Melanoma No family hx of        Social History     Socioeconomic History     Marital status:      Spouse name: Not on file     Number of children: Not on file     Years of education: Not on file     Highest education level: Not on file   Occupational History     Not on file   Social Needs     Financial resource strain: Not on file     Food insecurity:     Worry: Not on file     Inability: Not on file     Transportation needs:     Medical: Not on file     Non-medical: Not on file   Tobacco Use     Smoking status: Passive Smoke Exposure - Never Smoker     Smokeless tobacco: Never Used   Substance and Sexual Activity     Alcohol use: No     Comment: occ     Drug use: No     Sexual activity: Not Currently     Partners: Male     Birth control/protection: Surgical   Lifestyle     Physical activity:     Days per week: Not on file     Minutes per session: Not on  file     Stress: Not on file   Relationships     Social connections:     Talks on phone: Not on file     Gets together: Not on file     Attends [employer] service: Not on file     Active member of club or organization: Not on file     Attends meetings of clubs or organizations: Not on file     Relationship status: Not on file     Intimate partner violence:     Fear of current or ex partner: Not on file     Emotionally abused: Not on file     Physically abused: Not on file     Forced sexual activity: Not on file   Other Topics Concern     Parent/sibling w/ CABG, MI or angioplasty before 65F 55M? No   Social History Narrative    20 y.o- patient's mother   of lung cancer. She had to take care of her younger sister.    2012- patient's  had a heart attack with stents placed, followed by cardiac rehabilitation    2000 TO 2012  was in [facility] for depression    2013 patient's  went through alcohol rehabilitation at [facility]            They have attended couple counseling a couple of times and patient went to the family program for chemical dependency.    Patient denies alcohol or drug use and herself            Has 2 children, girls ages 10 and 13     For a while she was working 3 jobs since her  was ill. 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           Outpatient Encounter Medications as of 10/30/2019   Medication Sig Dispense Refill     Acetaminophen (TYLENOL PO) Take 1,000 mg by mouth every 8 hours as needed for mild pain or fever       adalimumab (HUMIRA *CF* PEN) 40 MG/0.4ML pen kit Inject 40 mg Subcutaneous every 14 days        albuterol (PROAIR HFA/PROVENTIL HFA/VENTOLIN HFA) 108 (90 Base) MCG/ACT inhaler Inhale 2 puffs into the lungs 4 times daily 1 Inhaler 0     Calcium Carbonate (CALCIUM-CARB 600 PO) Take 1 tablet by mouth every morning        [] doxycycline  "hyclate (VIBRA-TABS) 100 MG tablet Take 1 tablet (100 mg) by mouth 2 times daily for 10 days 20 tablet 0     fluocinonide (LIDEX) 0.05 % external cream Apply sparingly to affected area twice daily as needed.  Do not apply to face. (Patient not taking: Reported on 2019) 120 g 3     hydrOXYzine (ATARAX) 25 MG tablet TAKE ONE TABLET BY MOUTH EVERY 8 HOURS AS NEEDED FOR ANXIETY 30 tablet 0     [] MISC NATURAL PRODUCTS PO        ondansetron (ZOFRAN) 8 MG tablet Take by mouth every 8 hours as needed for nausea       order for DME Equipment being ordered: X2 Wearease Compression shirt. (Patient not taking: Reported on 2019) 2 each 1     order for DME Equipment being ordered: 20-30 mmHg compression sleeve and 20-30 mmHg compression glove x 2 pair. (Patient not taking: Reported on 2019) 2 each 1     order for DME Equipment being ordered: x2 Wearease compression shirt (Patient not taking: Reported on 2019) 1 each 0     order for DME Equipment being ordered: 20-30mmHg compression sleeve and 20-30mmHg compression glove\" x2 pair (Patient not taking: Reported on 2019) 1 Units 0     oxyCODONE (ROXICODONE) 5 MG tablet Take 1 tablet (5 mg) by mouth every 6 hours as needed for severe pain (Patient not taking: Reported on 2019) 10 tablet 0     predniSONE (DELTASONE) 20 MG tablet 10 mg daily; further taper instructions to follow on        predniSONE (DELTASONE) 5 MG tablet Take 1 tablet (5 mg) by mouth daily Prednisone taper:  30 mg p.o. daily x7 days.  27.5 mg p.o. daily x7 days.  25 mg p.o. daily x7 days.  22.5 mg p.o. daily x7 days. 147 tablet 0     pregabalin (LYRICA) 25 MG capsule Take 1 capsule (25 mg) by mouth 2 times daily Add to 75 mg for a total of 100 twice daily 180 capsule 3     pregabalin (LYRICA) 75 MG capsule TAKE ONE CAPSULE BY MOUTH TWICE A DAY 60 capsule 1     promethazine (PHENERGAN) 25 MG tablet Take 1 tablet (25 mg) by mouth every 4 hours as needed for nausea or vomiting " (Patient not taking: Reported on 7/16/2019) 56 tablet 0     venlafaxine (EFFEXOR-ER) 225 MG 24 hr tablet Take 1 tablet (225 mg) by mouth daily 90 each 1     VITAMIN D3 1000 units tablet TAKE THREE TABLETS BY MOUTH EVERY  tablet 1     Facility-Administered Encounter Medications as of 10/30/2019   Medication Dose Route Frequency Provider Last Rate Last Dose     lidocaine 1 % 9 mL  9 mL Intradermal Once Anna Naidu MD         sodium bicarbonate 8.4 % injection 1 mEq  1 mEq Intradermal Once Anna Naidu MD                 Review Of Systems  Skin: As above  Eyes: negative  Ears/Nose/Throat: negative  Respiratory: No shortness of breath, dyspnea on exertion, cough, or hemoptysis  Cardiovascular: negative  Gastrointestinal: negative  Genitourinary: negative  Musculoskeletal: negative  Neurologic: negative  Psychiatric: negative  Hematologic/Lymphatic/Immunologic: negative  Endocrine: negative      O:   NAD, WDWN, Alert & Oriented, Mood & Affect wnl, Vitals stable   Here today alone   /76   Pulse 90    General appearance normal   Vitals stable   Alert, oriented and in no acute distress        Following lymph nodes palpated: Occipital, Cervical, Supraclavicular , axilla, inguinal, femoral no lad                                         Stuck on papules and brown macules on trunk and ext                                          Pink papules on trunk                           Red papules on trunk              The remainder of the full exam was unremarkable; the following areas were examined:  conjunctiva/lids, oral mucosa, neck, peripheral vascular system, abdomen, lymph nodes, digits/nails, eccrine and apocrine glands, scalp/hair, face, neck, chest, abdomen, buttocks, back, RUE, LUE, RLE, LLE                                                         Eyes: Conjunctivae/lids:Normal                                                     ENT: Lips, buccal mucosa, tongue:  normal                                                    MSK:Normal                                                    Cardiovascular: peripheral edema none                                                    Pulm: Breathing Normal                                                    Lymph Nodes: No Head and Neck Lymphadenopathy                                                     Neuro/Psych: Orientation:Normal; Mood/Affect:Normal          A/P:  1. Metastatic melanoma, seborrheic keratosis, lentigo, dermal nevi, angioma, hx of basal cell carcinoma         MELANOMA DISCUSSED WITH PATIENT:  I discussed the specifics of tumor, prognosis, metachronous melanoma, self exam, and genetics with the patient. I explained the need for monthly skin exams including and taught the patient how to do this. Patient was asked about new or changing moles and a full skin exam was performed.   BENIGN LESIONS DISCUSSED WITH PATIENT:  I discussed the specifics of tumor, prognosis, and genetics of benign lesions.  I explained that treatment of these lesions would be purely cosmetic and not medically neccessary.  I discussed with patient different removal options including excision, cautery and /or laser.        Nature and genetics of benign skin lesions dicussed with patient.  Signs and Symptoms of skin cancer discussed with patient.  ABCDEs of melanoma reviewed with patient.  Patient encouraged to perform monthly skin exams.  UV precautions reviewed with patient.  Skin care regimen reviewed with patient: Eliminate harsh soaps, i.e. Dial, zest, irsih spring; Mild soaps such as Cetaphil or Dove sensitive skin, avoid hot or cold showers, aggressive use of emollients including vanicream, cetaphil or cerave discussed with patient.    Risks of non-melanoma skin cancer discussed with patient   Return to clinic 3 months

## 2019-10-31 ENCOUNTER — OFFICE VISIT (OUTPATIENT)
Dept: ANESTHESIOLOGY | Facility: CLINIC | Age: 44
End: 2019-10-31
Payer: COMMERCIAL

## 2019-10-31 VITALS
HEART RATE: 80 BPM | SYSTOLIC BLOOD PRESSURE: 137 MMHG | BODY MASS INDEX: 45.36 KG/M2 | HEIGHT: 63 IN | RESPIRATION RATE: 16 BRPM | DIASTOLIC BLOOD PRESSURE: 89 MMHG | WEIGHT: 256 LBS

## 2019-10-31 DIAGNOSIS — M79.2 NEUROPATHIC PAIN: Primary | ICD-10-CM

## 2019-10-31 ASSESSMENT — ANXIETY QUESTIONNAIRES
1. FEELING NERVOUS, ANXIOUS, OR ON EDGE: NEARLY EVERY DAY
2. NOT BEING ABLE TO STOP OR CONTROL WORRYING: NEARLY EVERY DAY
5. BEING SO RESTLESS THAT IT IS HARD TO SIT STILL: SEVERAL DAYS
4. TROUBLE RELAXING: NEARLY EVERY DAY
GAD7 TOTAL SCORE: 19
7. FEELING AFRAID AS IF SOMETHING AWFUL MIGHT HAPPEN: NEARLY EVERY DAY
GAD7 TOTAL SCORE: 19
6. BECOMING EASILY ANNOYED OR IRRITABLE: NEARLY EVERY DAY
3. WORRYING TOO MUCH ABOUT DIFFERENT THINGS: NEARLY EVERY DAY
7. FEELING AFRAID AS IF SOMETHING AWFUL MIGHT HAPPEN: NEARLY EVERY DAY

## 2019-10-31 ASSESSMENT — ENCOUNTER SYMPTOMS
SPEECH CHANGE: 0
POLYDIPSIA: 1
HEMOPTYSIS: 0
HOARSE VOICE: 0
ARTHRALGIAS: 1
EYE PAIN: 0
LOSS OF CONSCIOUSNESS: 0
SORE THROAT: 0
SHORTNESS OF BREATH: 1
MYALGIAS: 1
COUGH: 0
SINUS CONGESTION: 0
SPUTUM PRODUCTION: 0
NERVOUS/ANXIOUS: 1
DEPRESSION: 1
MUSCLE CRAMPS: 1
SNORES LOUDLY: 1
MUSCLE WEAKNESS: 1
FEVER: 0
WEAKNESS: 1
POSTURAL DYSPNEA: 0
SMELL DISTURBANCE: 0
NIGHT SWEATS: 1
COUGH DISTURBING SLEEP: 0
DISTURBANCES IN COORDINATION: 0
POLYPHAGIA: 0
PARALYSIS: 0
TREMORS: 0
SEIZURES: 0
TINGLING: 1
INSOMNIA: 1
HEADACHES: 1
DECREASED CONCENTRATION: 0
FATIGUE: 1
TASTE DISTURBANCE: 0
DIZZINESS: 0
MEMORY LOSS: 0
EYE IRRITATION: 0
EYE REDNESS: 1
NUMBNESS: 1
EYE WATERING: 0
ALTERED TEMPERATURE REGULATION: 1
HALLUCINATIONS: 0
STIFFNESS: 1
SINUS PAIN: 0
BACK PAIN: 1
DYSPNEA ON EXERTION: 1
DOUBLE VISION: 0
DECREASED APPETITE: 1
NECK PAIN: 0
INCREASED ENERGY: 1
NECK MASS: 0
JOINT SWELLING: 1
TROUBLE SWALLOWING: 0
WEIGHT GAIN: 1

## 2019-10-31 ASSESSMENT — PATIENT HEALTH QUESTIONNAIRE - PHQ9
SUM OF ALL RESPONSES TO PHQ QUESTIONS 1-9: 18
SUM OF ALL RESPONSES TO PHQ QUESTIONS 1-9: 18
10. IF YOU CHECKED OFF ANY PROBLEMS, HOW DIFFICULT HAVE THESE PROBLEMS MADE IT FOR YOU TO DO YOUR WORK, TAKE CARE OF THINGS AT HOME, OR GET ALONG WITH OTHER PEOPLE: SOMEWHAT DIFFICULT

## 2019-10-31 ASSESSMENT — MIFFLIN-ST. JEOR: SCORE: 1777.4

## 2019-10-31 ASSESSMENT — PAIN SCALES - GENERAL: PAINLEVEL: MODERATE PAIN (4)

## 2019-10-31 NOTE — PATIENT INSTRUCTIONS
1. Increase your Lyrica to 150 mg Two times daily as follows.  AM    PM                     100 mg      150mg   Do this for 1 week, increase as tolerated to next line.  150 mg                                  150 mg. Continue at this dose.     Don't drive on this medication until you know how it effects you.  Common side effects are drowsiness and dizziness  You can go slower if any side effects  Once on higher doses, you cannot stop this medication abruptly.  Tapering instructions would need to be provided by a medical professional.      2. Dr. Sandhu has discussed Medical Cannabis with you, please research this further to determine if you are interested in being certified.       Follow up: In clinic with Dr. Sandhu in 1 month.       To speak with a nurse, schedule/reschedule/cancel a clinic appointment, or request a medication refill call: [phone]     You can also reach us by [employer]: [email or web address]    For refills, please call on Monday, 1 week before your medication runs out. The doctors are not always in clinic, so this gives us time to get your prescriptions ready.  Please let us know the name of the medication you are requesting a refill of.

## 2019-10-31 NOTE — LETTER
10/31/2019       RE: Doretha Fernandez  [address]  [place] [address]     Dear Colleague,    Thank you for referring your patient, Doretha Fernandez, to the [facility] at [facility]. Please see a copy of my visit note below.    [facility]  Pain Clinic Evaluation    CC: widespread pain involving hands and feet    History of present illness:   44 F PMH of anxiety, depression, chronic pain syndrome, functional bowel disease, melanoma, asthma, dysfunctional uterine bleeding, CVA, TIA, and rheumatoid arthritis referred from onocology clinic at [facility].  She states that her pain started after immunotherapy for stage 3 metastatic melanoma.  Left axillary lymph node was positive with no known primary.  She also developed colitis after starting immunotherapy.  Eventually she was diagnosed with seronegative rheumatoid arthritis for which she has been working with rheumatologist at the [facility] and taking high-dose prednisone for over a year.  For cancer diagnosis she has been seeing a dermatologist for skin checks and doing PET scans.    Patient notes that she has pain in her hands, knees, feet going up into the back of her lower legs.  She is on Lyrica 100 BID.  She is also on venlafaxine 225mg for depression.  She does not note any particular triggers for the pain.  However when she gets up after sitting for a while her knees and feet with bother her. In general her pain is worse in the morning or night.         Previous Treatments have included:  #Medications: Humira, Lyrica, prednisone, oxycodone in the past  #Therapy: PT  #Psychology: Follows with mental health clinic    Social History:  Tobacco Use     Smoking status: Passive Smoke Exposure - Never Smoker     Smokeless tobacco: Never Used   Substance Use Topics     Alcohol use: No       Comment: occ     Drug use: No       Past Medical History:  Past Medical History:    Diagnosis Date     Abnormal MRI     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      Anxiety      Basal cell carcinoma      Depression      Lumbago     left lower back pain     Malignant melanoma (H)      Mild persistent asthma      Prothrombin deficiency (H)     takes 81mg asa daily     Stroke (cerebrum) (H)     During      TIA (transient ischemic attack)        Medications:  Current Outpatient Medications   Medication Sig Dispense Refill     Acetaminophen (TYLENOL PO) Take 1,000 mg by mouth every 8 hours as needed for mild pain or fever       adalimumab (HUMIRA *CF* PEN) 40 MG/0.4ML pen kit Inject 40 mg Subcutaneous every 14 days        albuterol (PROAIR HFA/PROVENTIL HFA/VENTOLIN HFA) 108 (90 Base) MCG/ACT inhaler Inhale 2 puffs into the lungs 4 times daily 1 Inhaler 0     Calcium Carbonate (CALCIUM-CARB 600 PO) Take 1 tablet by mouth every morning        hydrOXYzine (ATARAX) 25 MG tablet TAKE ONE TABLET BY MOUTH EVERY 8 HOURS AS NEEDED FOR ANXIETY 30 tablet 0     ondansetron (ZOFRAN) 8 MG tablet Take by mouth every 8 hours as needed for nausea       predniSONE (DELTASONE) 20 MG tablet 10 mg daily; further taper instructions to follow on        predniSONE (DELTASONE) 5 MG tablet Take 1 tablet (5 mg) by mouth daily Prednisone taper:  30 mg p.o. daily x7 days.  27.5 mg p.o. daily x7 days.  25 mg p.o. daily x7 days.  22.5 mg p.o. daily x7 days. 147 tablet 0     pregabalin (LYRICA) 25 MG capsule Take 1 capsule (25 mg) by mouth 2 times daily Add to 75 mg for a total of 100 twice daily 180 capsule 3     pregabalin (LYRICA) 75 MG capsule TAKE ONE CAPSULE BY MOUTH TWICE A DAY 60 capsule 1     venlafaxine (EFFEXOR-ER) 225 MG 24 hr tablet Take 1 tablet (225 mg) by mouth daily 90 each 1     VITAMIN D3 1000 units tablet TAKE THREE TABLETS BY MOUTH EVERY  tablet 1     fluocinonide (LIDEX) 0.05 % external cream Apply sparingly to affected area twice daily as needed.  Do not apply to  "face. (Patient not taking: Reported on 7/16/2019) 120 g 3     order for DME Equipment being ordered: X2 Wearease Compression shirt. (Patient not taking: Reported on 7/16/2019) 2 each 1     order for DME Equipment being ordered: 20-30 mmHg compression sleeve and 20-30 mmHg compression glove x 2 pair. (Patient not taking: Reported on 7/16/2019) 2 each 1     order for DME Equipment being ordered: x2 Wearease compression shirt (Patient not taking: Reported on 7/16/2019) 1 each 0     order for DME Equipment being ordered: 20-30mmHg compression sleeve and 20-30mmHg compression glove\" x2 pair (Patient not taking: Reported on 7/16/2019) 1 Units 0     oxyCODONE (ROXICODONE) 5 MG tablet Take 1 tablet (5 mg) by mouth every 6 hours as needed for severe pain (Patient not taking: Reported on 9/5/2019) 10 tablet 0     promethazine (PHENERGAN) 25 MG tablet Take 1 tablet (25 mg) by mouth every 4 hours as needed for nausea or vomiting (Patient not taking: Reported on 7/16/2019) 56 tablet 0       Allergies:  Allergies   Allergen Reactions     Bee Venom Swelling     Azithromycin Diarrhea     Erythromycin      Other reaction(s): GI intolerance, Vomiting     Fentanyl Other (See Comments)     sweating  sweating     Prochlorperazine Fatigue     Other reaction(s): Other (see comments)  Fatigue     Buspirone      Other reaction(s): GI intolerance  vomiting     Erythrocin Nausea and Vomiting     Zithromax [Azithromycin Dihydrate] Diarrhea     Enbrel [Etanercept] Hives and Rash     Exam:  General: No acute distress, sitting comfortably   Inspection: no deformities of BUE/BLE; no evidence of scoliosis, symmetric positioning of bilateral shoulders and iliac crests   Palpation: nTTP throughout thoracic to lumbar paraspinals, ASISes, PSISes, GTs, ITs, piriformis muscles   ROM: full functional painless ROM of bilateral hips, knees, ankles, and lumbar spine.   LUMBAR RANGE OF MOTION:  - Flexion: mildly limited, mild pain  - Extension: moderately " limited, moderate pain  - Lateral bending: mildly limited, moderate, severe  Strength:   Sensation: Intact to light touch bilaterally along L3, L4, L5, S1, S2.  Reflexes:  Special tests:   Negative: bilateral SLR, hip scour, FADIR, SHELIA, SI distraction, sacral thrust, Yeoman's, lalito, Gaenslen   Psych:      Assessment:      This is a 44 year old female with PMH of skin cancer s/p immunomodulating treatment with resultant hand and foot pain.  Her pain picture is consistent with a neuropathic pain state as this isn't uncommon after this type of treatment.  Additionally, the location of her pain is affecting the most distal parts of her body which neuropathic pain does tend to affect.        Plan:   Additional Work-up:  None at this time  Cannabis: Discussed with patient trialing medical cannabis. Patient states that it would likely be too expensive for her. She will think about it and will consider on next visit.   Medications:  Patient is on lyrica 100 BID for neuropathic pain.  We have discussed about starting a second line neuropathic pain medication such as amitriptyline, duloxetine however patient states that she has not tolerated the medication in the past.   Will increase 150mg at night for now. If patient tolerates well will increase to 150 BID.  She has also tried neuropathic cream without much benefit.  Interventions: None.  Discussed about dorsal column stimulation for the lower extremity neuropathic pain.  However we told her that it could be the last resort and we will try noninvasive and medication therapy at first.    Follow-up:  Return to clinic in 1 month      Physician Attestation   I saw and evaluated Doretha Fernandez.  I agree with above history, review of systems, physical exam and plan.  I have reviewed the content of the documentation and have edited it as needed. I have personally performed the services documented here and the documentation accurately represents those services and the decisions  I have made.     Young Sandhu MD, PhD    [facility] FOR COMPREHENSIVE PAIN MANAGEMENT  [address]  [address] [facility] [address] [phone]  Dept: [phone]

## 2019-10-31 NOTE — NURSING NOTE
Depression Response    Patient completed the PHQ-9 assessment for depression and scored >9? Yes  Question 9 on the PHQ-9 was positive for suicidality? No  Is the patient already receiving treatment for depression? Yes  Patient would like to speak with behavioral health team ([facility] only)? Yes    I personally notified the following: visit provider    Behavioral Health/Social Work Contact Information     [facility]  Michael Swenson MA, LMFT  Lead Behavioral Health Clinician  Phone: [phone]  [facility] Pager: [phone]    Non-[facility]  [facility] On-Call   Pager: [phone]

## 2019-10-31 NOTE — PROGRESS NOTES
[facility]  Pain Clinic Evaluation    CC: widespread pain involving hands and feet    History of present illness:   44 F PMH of anxiety, depression, chronic pain syndrome, functional bowel disease, melanoma, asthma, dysfunctional uterine bleeding, CVA, TIA, and rheumatoid arthritis referred from onocology clinic at [facility].  She states that her pain started after immunotherapy for stage 3 metastatic melanoma.  Left axillary lymph node was positive with no known primary.  She also developed colitis after starting immunotherapy.  Eventually she was diagnosed with seronegative rheumatoid arthritis for which she has been working with rheumatologist at the [facility] and taking high-dose prednisone for over a year.  For cancer diagnosis she has been seeing a dermatologist for skin checks and doing PET scans.    Patient notes that she has pain in her hands, knees, feet going up into the back of her lower legs.  She is on Lyrica 100 BID.  She is also on venlafaxine 225mg for depression.  She does not note any particular triggers for the pain.  However when she gets up after sitting for a while her knees and feet with bother her. In general her pain is worse in the morning or night.         Previous Treatments have included:  #Medications: Humira, Lyrica, prednisone, oxycodone in the past  #Therapy: PT  #Psychology: Follows with mental health clinic    Social History:  Tobacco Use     Smoking status: Passive Smoke Exposure - Never Smoker     Smokeless tobacco: Never Used   Substance Use Topics     Alcohol use: No       Comment: occ     Drug use: No       Past Medical History:  Past Medical History:   Diagnosis Date     Abnormal MRI     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      Anxiety      Basal cell carcinoma      Depression      Lumbago     left lower back pain     Malignant melanoma (H)      Mild persistent asthma      Prothrombin deficiency (H)     takes 81mg asa daily     Stroke  (cerebrum) (H)     During      TIA (transient ischemic attack)        Medications:  Current Outpatient Medications   Medication Sig Dispense Refill     Acetaminophen (TYLENOL PO) Take 1,000 mg by mouth every 8 hours as needed for mild pain or fever       adalimumab (HUMIRA *CF* PEN) 40 MG/0.4ML pen kit Inject 40 mg Subcutaneous every 14 days        albuterol (PROAIR HFA/PROVENTIL HFA/VENTOLIN HFA) 108 (90 Base) MCG/ACT inhaler Inhale 2 puffs into the lungs 4 times daily 1 Inhaler 0     Calcium Carbonate (CALCIUM-CARB 600 PO) Take 1 tablet by mouth every morning        hydrOXYzine (ATARAX) 25 MG tablet TAKE ONE TABLET BY MOUTH EVERY 8 HOURS AS NEEDED FOR ANXIETY 30 tablet 0     ondansetron (ZOFRAN) 8 MG tablet Take by mouth every 8 hours as needed for nausea       predniSONE (DELTASONE) 20 MG tablet 10 mg daily; further taper instructions to follow on        predniSONE (DELTASONE) 5 MG tablet Take 1 tablet (5 mg) by mouth daily Prednisone taper:  30 mg p.o. daily x7 days.  27.5 mg p.o. daily x7 days.  25 mg p.o. daily x7 days.  22.5 mg p.o. daily x7 days. 147 tablet 0     pregabalin (LYRICA) 25 MG capsule Take 1 capsule (25 mg) by mouth 2 times daily Add to 75 mg for a total of 100 twice daily 180 capsule 3     pregabalin (LYRICA) 75 MG capsule TAKE ONE CAPSULE BY MOUTH TWICE A DAY 60 capsule 1     venlafaxine (EFFEXOR-ER) 225 MG 24 hr tablet Take 1 tablet (225 mg) by mouth daily 90 each 1     VITAMIN D3 1000 units tablet TAKE THREE TABLETS BY MOUTH EVERY  tablet 1     fluocinonide (LIDEX) 0.05 % external cream Apply sparingly to affected area twice daily as needed.  Do not apply to face. (Patient not taking: Reported on 2019) 120 g 3     order for DME Equipment being ordered: X2 Wearease Compression shirt. (Patient not taking: Reported on 2019) 2 each 1     order for DME Equipment being ordered: 20-30 mmHg compression sleeve and 20-30 mmHg compression glove x 2 pair. (Patient  "not taking: Reported on 7/16/2019) 2 each 1     order for DME Equipment being ordered: x2 Wearease compression shirt (Patient not taking: Reported on 7/16/2019) 1 each 0     order for DME Equipment being ordered: 20-30mmHg compression sleeve and 20-30mmHg compression glove\" x2 pair (Patient not taking: Reported on 7/16/2019) 1 Units 0     oxyCODONE (ROXICODONE) 5 MG tablet Take 1 tablet (5 mg) by mouth every 6 hours as needed for severe pain (Patient not taking: Reported on 9/5/2019) 10 tablet 0     promethazine (PHENERGAN) 25 MG tablet Take 1 tablet (25 mg) by mouth every 4 hours as needed for nausea or vomiting (Patient not taking: Reported on 7/16/2019) 56 tablet 0       Allergies:  Allergies   Allergen Reactions     Bee Venom Swelling     Azithromycin Diarrhea     Erythromycin      Other reaction(s): GI intolerance, Vomiting     Fentanyl Other (See Comments)     sweating  sweating     Prochlorperazine Fatigue     Other reaction(s): Other (see comments)  Fatigue     Buspirone      Other reaction(s): GI intolerance  vomiting     Erythrocin Nausea and Vomiting     Zithromax [Azithromycin Dihydrate] Diarrhea     Enbrel [Etanercept] Hives and Rash     Exam:  General: No acute distress, sitting comfortably   Inspection: no deformities of BUE/BLE; no evidence of scoliosis, symmetric positioning of bilateral shoulders and iliac crests   Palpation: nTTP throughout thoracic to lumbar paraspinals, ASISes, PSISes, GTs, ITs, piriformis muscles   ROM: full functional painless ROM of bilateral hips, knees, ankles, and lumbar spine.   LUMBAR RANGE OF MOTION:  - Flexion: mildly limited, mild pain  - Extension: moderately limited, moderate pain  - Lateral bending: mildly limited, moderate, severe  Strength:   Sensation: Intact to light touch bilaterally along L3, L4, L5, S1, S2.  Reflexes:  Special tests:   Negative: bilateral SLR, hip scour, FADIR, SHELIA, SI distraction, sacral thrust, Yeoman's, lalito, Gaenslen "   Psych:      Assessment:      This is a 44 year old female with PMH of skin cancer s/p immunomodulating treatment with resultant hand and foot pain.  Her pain picture is consistent with a neuropathic pain state as this isn't uncommon after this type of treatment.  Additionally, the location of her pain is affecting the most distal parts of her body which neuropathic pain does tend to affect.        Plan:   Additional Work-up:  None at this time  Cannabis: Discussed with patient trialing medical cannabis. Patient states that it would likely be too expensive for her. She will think about it and will consider on next visit.   Medications:  Patient is on lyrica 100 BID for neuropathic pain.  We have discussed about starting a second line neuropathic pain medication such as amitriptyline, duloxetine however patient states that she has not tolerated the medication in the past.   Will increase 150mg at night for now. If patient tolerates well will increase to 150 BID.  She has also tried neuropathic cream without much benefit.  Interventions: None.  Discussed about dorsal column stimulation for the lower extremity neuropathic pain.  However we told her that it could be the last resort and we will try noninvasive and medication therapy at first.    Follow-up:  Return to clinic in 1 month      Physician Attestation   I saw and evaluated Doretha Fernandez.  I agree with above history, review of systems, physical exam and plan.  I have reviewed the content of the documentation and have edited it as needed. I have personally performed the services documented here and the documentation accurately represents those services and the decisions I have made.     Young Sandhu MD, PhD    [facility] FOR COMPREHENSIVE PAIN MANAGEMENT  [address] [address] [phone]  Dept: [phone]

## 2019-11-02 ASSESSMENT — ANXIETY QUESTIONNAIRES: GAD7 TOTAL SCORE: 19

## 2019-11-02 ASSESSMENT — PATIENT HEALTH QUESTIONNAIRE - PHQ9: SUM OF ALL RESPONSES TO PHQ QUESTIONS 1-9: 18

## 2019-11-03 ENCOUNTER — TELEPHONE (OUTPATIENT)
Dept: ANESTHESIOLOGY | Facility: CLINIC | Age: 44
End: 2019-11-03

## 2019-11-03 NOTE — TELEPHONE ENCOUNTER
Doretha came into the pharmacy looking for a prescription for Lyrica that was supposed to have been sent Thursday.  Please send to [facility] pharmacy (or may be phoned in)    Thank you,  Cleo Mckeon [facility]

## 2019-11-04 RX ORDER — PREGABALIN 50 MG/1
150 CAPSULE ORAL 2 TIMES DAILY
Qty: 180 CAPSULE | Refills: 1 | Status: SHIPPED | OUTPATIENT
Start: 2019-11-04 | End: 2020-01-27

## 2019-11-05 ENCOUNTER — TRANSFERRED RECORDS (OUTPATIENT)
Dept: HEALTH INFORMATION MANAGEMENT | Facility: CLINIC | Age: 44
End: 2019-11-05

## 2019-11-06 ENCOUNTER — HEALTH MAINTENANCE LETTER (OUTPATIENT)
Age: 44
End: 2019-11-06

## 2019-11-15 ENCOUNTER — HOSPITAL ENCOUNTER (EMERGENCY)
Facility: CLINIC | Age: 44
Discharge: HOME | End: 2019-11-15
Attending: EMERGENCY MEDICINE | Admitting: EMERGENCY MEDICINE
Payer: COMMERCIAL

## 2019-11-15 ENCOUNTER — APPOINTMENT (OUTPATIENT)
Dept: GENERAL RADIOLOGY | Facility: CLINIC | Age: 44
End: 2019-11-15
Attending: EMERGENCY MEDICINE
Payer: COMMERCIAL

## 2019-11-15 VITALS
SYSTOLIC BLOOD PRESSURE: 123 MMHG | DIASTOLIC BLOOD PRESSURE: 80 MMHG | HEIGHT: 63 IN | WEIGHT: 247 LBS | RESPIRATION RATE: 14 BRPM | BODY MASS INDEX: 43.77 KG/M2 | HEART RATE: 87 BPM | OXYGEN SATURATION: 94 % | TEMPERATURE: 98.3 F

## 2019-11-15 DIAGNOSIS — R07.9 CHEST PAIN, UNSPECIFIED TYPE: ICD-10-CM

## 2019-11-15 LAB
ALBUMIN SERPL-MCNC: 3.7 G/DL (ref 3.4–5)
ALP SERPL-CCNC: 60 U/L (ref 40–150)
ALT SERPL W P-5'-P-CCNC: 43 U/L (ref 0–50)
ANION GAP SERPL CALCULATED.3IONS-SCNC: 6 MMOL/L (ref 3–14)
AST SERPL W P-5'-P-CCNC: 14 U/L (ref 0–45)
BASOPHILS # BLD AUTO: 0.1 10E9/L (ref 0–0.2)
BASOPHILS NFR BLD AUTO: 0.6 %
BILIRUB SERPL-MCNC: 0.3 MG/DL (ref 0.2–1.3)
BUN SERPL-MCNC: 18 MG/DL (ref 7–30)
CALCIUM SERPL-MCNC: 9.1 MG/DL (ref 8.5–10.1)
CHLORIDE SERPL-SCNC: 105 MMOL/L (ref 94–109)
CO2 SERPL-SCNC: 26 MMOL/L (ref 20–32)
CREAT SERPL-MCNC: 0.73 MG/DL (ref 0.52–1.04)
D DIMER PPP FEU-MCNC: 0.4 UG/ML FEU (ref 0–0.5)
DIFFERENTIAL METHOD BLD: ABNORMAL
EOSINOPHIL # BLD AUTO: 0.1 10E9/L (ref 0–0.7)
EOSINOPHIL NFR BLD AUTO: 0.6 %
ERYTHROCYTE [DISTWIDTH] IN BLOOD BY AUTOMATED COUNT: 13.9 % (ref 10–15)
GFR SERPL CREATININE-BSD FRML MDRD: >90 ML/MIN/{1.73_M2}
GLUCOSE SERPL-MCNC: 105 MG/DL (ref 70–99)
HCT VFR BLD AUTO: 40.3 % (ref 35–47)
HGB BLD-MCNC: 12.7 G/DL (ref 11.7–15.7)
IMM GRANULOCYTES # BLD: 0.2 10E9/L (ref 0–0.4)
IMM GRANULOCYTES NFR BLD: 1.5 %
LYMPHOCYTES # BLD AUTO: 2.4 10E9/L (ref 0.8–5.3)
LYMPHOCYTES NFR BLD AUTO: 16.5 %
MCH RBC QN AUTO: 29.2 PG (ref 26.5–33)
MCHC RBC AUTO-ENTMCNC: 31.5 G/DL (ref 31.5–36.5)
MCV RBC AUTO: 93 FL (ref 78–100)
MONOCYTES # BLD AUTO: 0.9 10E9/L (ref 0–1.3)
MONOCYTES NFR BLD AUTO: 5.9 %
NEUTROPHILS # BLD AUTO: 10.8 10E9/L (ref 1.6–8.3)
NEUTROPHILS NFR BLD AUTO: 74.9 %
NRBC # BLD AUTO: 0 10*3/UL
NRBC BLD AUTO-RTO: 0 /100
PLATELET # BLD AUTO: 296 10E9/L (ref 150–450)
POTASSIUM SERPL-SCNC: 3.6 MMOL/L (ref 3.4–5.3)
PROT SERPL-MCNC: 7 G/DL (ref 6.8–8.8)
RBC # BLD AUTO: 4.35 10E12/L (ref 3.8–5.2)
SODIUM SERPL-SCNC: 137 MMOL/L (ref 133–144)
TROPONIN I SERPL-MCNC: <0.015 UG/L (ref 0–0.04)
TROPONIN I SERPL-MCNC: <0.015 UG/L (ref 0–0.04)
WBC # BLD AUTO: 14.4 10E9/L (ref 4–11)

## 2019-11-15 PROCEDURE — 93005 ELECTROCARDIOGRAM TRACING: CPT | Performed by: EMERGENCY MEDICINE

## 2019-11-15 PROCEDURE — 99284 EMERGENCY DEPT VISIT MOD MDM: CPT | Mod: 25 | Performed by: EMERGENCY MEDICINE

## 2019-11-15 PROCEDURE — 84484 ASSAY OF TROPONIN QUANT: CPT | Performed by: EMERGENCY MEDICINE

## 2019-11-15 PROCEDURE — 25000128 H RX IP 250 OP 636: Performed by: EMERGENCY MEDICINE

## 2019-11-15 PROCEDURE — 93010 ELECTROCARDIOGRAM REPORT: CPT | Mod: Z6 | Performed by: EMERGENCY MEDICINE

## 2019-11-15 PROCEDURE — 85379 FIBRIN DEGRADATION QUANT: CPT | Performed by: EMERGENCY MEDICINE

## 2019-11-15 PROCEDURE — 85025 COMPLETE CBC W/AUTO DIFF WBC: CPT | Performed by: EMERGENCY MEDICINE

## 2019-11-15 PROCEDURE — 71046 X-RAY EXAM CHEST 2 VIEWS: CPT

## 2019-11-15 PROCEDURE — 96374 THER/PROPH/DIAG INJ IV PUSH: CPT | Performed by: EMERGENCY MEDICINE

## 2019-11-15 PROCEDURE — 99285 EMERGENCY DEPT VISIT HI MDM: CPT | Mod: 25 | Performed by: EMERGENCY MEDICINE

## 2019-11-15 PROCEDURE — 80053 COMPREHEN METABOLIC PANEL: CPT | Performed by: EMERGENCY MEDICINE

## 2019-11-15 RX ORDER — KETOROLAC TROMETHAMINE 15 MG/ML
15 INJECTION, SOLUTION INTRAMUSCULAR; INTRAVENOUS ONCE
Status: COMPLETED | OUTPATIENT
Start: 2019-11-15 | End: 2019-11-15

## 2019-11-15 RX ORDER — HYDROMORPHONE HYDROCHLORIDE 1 MG/ML
0.5 INJECTION, SOLUTION INTRAMUSCULAR; INTRAVENOUS; SUBCUTANEOUS
Status: DISCONTINUED | OUTPATIENT
Start: 2019-11-15 | End: 2019-11-15 | Stop reason: HOSPADM

## 2019-11-15 RX ORDER — HYDROXYZINE HYDROCHLORIDE 25 MG/1
25 TABLET, FILM COATED ORAL AT BEDTIME
COMMUNITY
Start: 2019-10-23 | End: 2019-12-13

## 2019-11-15 RX ADMIN — KETOROLAC TROMETHAMINE 15 MG: 15 INJECTION, SOLUTION INTRAMUSCULAR; INTRAVENOUS at 16:12

## 2019-11-15 ASSESSMENT — ENCOUNTER SYMPTOMS
SORE THROAT: 0
NAUSEA: 0
ABDOMINAL PAIN: 0
DYSURIA: 0
FEVER: 0
CHILLS: 0
FREQUENCY: 0

## 2019-11-15 ASSESSMENT — MIFFLIN-ST. JEOR: SCORE: 1744.51

## 2019-11-15 NOTE — ED AVS SNAPSHOT
[facility] Emergency Department  [address] [address]  Phone:  [phone]  Fax:  [phone]                                    Doretha Fernandez   MRN: [id number]    Department:  [facility] Emergency Department   Date of Visit:  11/15/2019           After Visit Summary Signature Page    I have received my discharge instructions, and my questions have been answered. I have discussed any challenges I see with this plan with the nurse or doctor.    ..........................................................................................................................................  Patient/Patient Representative Signature      ..........................................................................................................................................  Patient Representative Print Name and Relationship to Patient    ..................................................               ................................................  Date                                   Time    ..........................................................................................................................................  Reviewed by Signature/Title    ...................................................              ..............................................  Date                                               Time          22EPIC Rev 08/18

## 2019-11-15 NOTE — ED PROVIDER NOTES
"  History     Chief Complaint   Patient presents with     Chest Pain     HPI  Doretha Fernandez is a 44 year old female with history of asthma anxiety depression, melanoma, colitis, and lymphedema who presents for evaluation of chest pain. Patient reports she woke up this morning with chest pain radiating to back shoulder blade. Pain present since onset this morning and nothing seems to make it better or worse. She describes the pain as heavy and \"stabbing\". She does not recall pain like this in the past. Patient reports she has taken loratadine. Patient also notes some tightness in her chest last night. She reports she used lymphadema machine yesterday. Patient reports she has felt tired for the past week. Patient reports lyrica medication has increased in dosage recently. Patient notes she usually has shortness of breath, but this has not changed recently. She denies nausea and cold sweats. She reports no fever, chills, sore throat, cough, and changes in vision and hearing. She also denies abdominal pain, dyuira, and urinary freuqency. Patient notes she is currently on her menstural period.  Patient recalls severe colitis in the past. Patient reports she had a stroke in the past and has history of blood clots. Patient reports last pet scan was clear. She denies taking blood thinners. Patient denies smoking. Patient reports she has a number of allergies to medication.       The patient's PMHx, Surgical Hx, Allergies, and Medications were all reviewed with the patient.    Allergies:  Allergies   Allergen Reactions     Bee Venom Swelling     Azithromycin Diarrhea     Erythromycin      Other reaction(s): GI intolerance, Vomiting     Fentanyl Other (See Comments)     sweating  sweating     Prochlorperazine Fatigue     Other reaction(s): Other (see comments)  Fatigue     Buspirone      Other reaction(s): GI intolerance  vomiting     Erythrocin Nausea and Vomiting     Zithromax [Azithromycin Dihydrate] Diarrhea     Enbrel " [Etanercept] Hives and Rash       Problem List:    Patient Active Problem List    Diagnosis Date Noted     Immunosuppressed status (H) 09/05/2019     Priority: Medium     Ulcerative colitis with complication, unspecified location (H) 09/05/2019     Priority: Medium     Morbid obesity (H) 09/05/2019     Priority: Medium     Influenza-like illness 05/06/2018     Priority: Medium     Other chronic pain 05/06/2018     Priority: Medium     Rash and nonspecific skin eruption 04/05/2018     Priority: Medium     Bilateral leg cramps 03/07/2018     Priority: Medium     Intestinal giardiasis 03/05/2018     Priority: Medium     Rectal bleeding 03/04/2018     Priority: Medium     Diarrhea 03/04/2018     Priority: Medium     Colitis 03/01/2018     Priority: Medium     Functional diarrhea 02/22/2018     Priority: Medium     Melanoma (H) 10/23/2017     Priority: Medium     Malignant melanoma of left upper extremity including shoulder (H) 10/12/2017     Priority: Medium     Metastatic malignant melanoma (H) 10/10/2017     Priority: Medium     Prothrombin mutation (H) 04/05/2017     Priority: Medium     On daily aspirin 81 mg per hematology's recommendations from 2008       Vision changes 04/01/2017     Priority: Medium     Mild intermittent asthma without complication 11/08/2013     Priority: Medium     Mild major depression (H) 06/24/2013     Priority: Medium     Anxiety state 09/13/2012     Priority: Medium     Problem list name updated by automated process. Provider to review       Esophageal reflux 09/13/2012     Priority: Medium     DUB (dysfunctional uterine bleeding) 07/28/2011     Priority: Medium     CARDIOVASCULAR SCREENING; LDL GOAL LESS THAN 160 07/28/2011     Priority: Low        Past Medical History:    Past Medical History:   Diagnosis Date     Abnormal MRI      Anxiety      Basal cell carcinoma      Depression      Lumbago      Malignant melanoma (H)      Mild persistent asthma      Prothrombin deficiency (H)       Stroke (cerebrum) (H)      TIA (transient ischemic attack)        Past Surgical History:    Past Surgical History:   Procedure Laterality Date     COLONOSCOPY N/A 10/18/2017    Procedure: COLONOSCOPY;  Colon;  Surgeon: Debbie Stephens MD;  [facility]: [facility]     COLONOSCOPY N/A 3/9/2018    Procedure: COMBINED COLONOSCOPY, SINGLE OR MULTIPLE BIOPSY/POLYPECTOMY BY BIOPSY;  colon  ;  Surgeon: Benita Schumacher MD;  Location: [facility]     DISSECT LYMPH NODE AXILLA Left 10/23/2017    Procedure: DISSECT LYMPH NODE AXILLA;  Left Axillary Lymph Node Dissection ;  Surgeon: Laurent Cool MD;  Location: [facility] OR     GYN SURGERY           REPAIR MOHS Left 2017    Procedure: REPAIR MOHS;  Left Upper Lid Moh's Reconstruction;  Surgeon: Kisha Bosch MD;  Location: [facility] OR       Family History:    Family History   Problem Relation Age of Onset     Cancer Mother 45        lung     Neurologic Disorder Mother      Lipids Father      Gastrointestinal Disease Father      Depression Father      Cancer Maternal Grandmother      Blood Disease Maternal Grandmother      Arthritis Maternal Grandmother      Diabetes Maternal Grandmother      Depression Maternal Grandmother      Macular Degeneration Maternal Grandmother      Glaucoma Maternal Grandmother      Diabetes Maternal Grandfather      Cerebrovascular Disease Maternal Grandfather      Blood Disease Maternal Grandfather      Heart Disease Maternal Grandfather      Glaucoma Maternal Grandfather      Cancer Paternal Grandmother      Cancer - colorectal Paternal Grandmother      Respiratory Paternal Grandfather      Blood Disease Paternal Grandfather      Heart Disease Daughter      Asthma Daughter      Depression Sister      Melanoma No family hx of        Social History:  Marital Status:   [4]  Social History     Tobacco Use     Smoking status: Passive Smoke Exposure - Never Smoker     Smokeless tobacco: Never Used   Substance Use Topics     Alcohol use: No      "Comment: occ     Drug use: No        Medications:    adalimumab (HUMIRA *CF* PEN) 40 MG/0.4ML pen kit  Calcium Carbonate (CALCIUM-CARB 600 PO)  hydrOXYzine (ATARAX) 25 MG tablet  predniSONE (DELTASONE) 5 MG tablet  pregabalin (LYRICA) 50 MG capsule  venlafaxine (EFFEXOR-ER) 225 MG 24 hr tablet  VITAMIN D3 1000 units tablet  Acetaminophen (TYLENOL PO)  albuterol (PROAIR HFA/PROVENTIL HFA/VENTOLIN HFA) 108 (90 Base) MCG/ACT inhaler  fluocinonide (LIDEX) 0.05 % external cream  ondansetron (ZOFRAN) 8 MG tablet  order for DME  order for DME  order for DME  order for DME  oxyCODONE (ROXICODONE) 5 MG tablet  promethazine (PHENERGAN) 25 MG tablet          Review of Systems   Constitutional: Negative for chills and fever.   HENT: Negative for hearing loss and sore throat.    Eyes: Negative for visual disturbance.   Cardiovascular: Positive for chest pain.   Gastrointestinal: Negative for abdominal pain and nausea.   Genitourinary: Negative for dysuria and frequency.       Physical Exam   BP: (!) 148/91  Pulse: 103  Heart Rate: 88  Temp: 98.3  F (36.8  C)  Resp: 22  Height: 160 cm (5' 3\")  Weight: 112 kg (247 lb)  SpO2: 94 %    Physical Exam  GEN: Awake, alert, and cooperative. No acute distress  HENT: MMM. External ears and nose normal bilaterally.  EYES: EOM intact. Conjunctiva clear.   CV : Regular rate and rhythm. Brisk capillary refill. No murmurs appreciated. Symmetric radial pulses.   PULM: Normal effort. No wheezes, rales, or rhonchi bilaterally.  ABD: Soft, non-tender, non-distended. No rebound or guarding.   NEURO: Normal speech. Following commands.  Patient answering questions and interacting appropriately.   EXT: No gross deformity. Warm and well perfused.   INT: Warm. No diaphoresis. Normal color.        ED Course        Procedures             EKG Interpretation:      Interpreted by Sanket Zacarias MD  Time reviewed: 1245  Symptoms at time of EKG: chest pain   Rhythm: normal sinus   Rate: Normal  Axis: " Normal  Ectopy: none  Conduction: normal  ST Segments/ T Waves: No acute ischemic changes  Q Waves: none  Comparison to prior: resolution of biphasic T wave compared to 4/1/17    Clinical Impression: normal EKG              Critical Care time:  none               No results found for this or any previous visit (from the past 24 hour(s)).    Medications   ketorolac (TORADOL) injection 15 mg (15 mg Intravenous Given 11/15/19 1612)       1:10PM Patient assessed.   Assessments & Plan (with Medical Decision Making)   44 year old female with past medical history of VTE, asthma, colitis, and lymphedema with one day of chest pain radiating to shoulder. On arrival to Emergency Department, vital signs were notable for blood pressure of 148/91.  ECG without evidence of acute ischemia.  Initial troponin below level of detection.  CMP notable only for mild elevation of glucose of 104, CBC with mild leukocytosis of 14, no anemia.  Given her history of VTE a d-dimer was obtained and was not elevated with level of 0.4.  Given this result, no pleuritic component of pain, no clinical signs of DVT, I have a very low suspicion for pulmonary embolism.  Chest x-ray without infiltrate, effusion, or pneumothorax.  She was given 15 mg IV ketorolac for pain with modest improvement.  Her blood pressure improved without any direct antihypertensive management.  Presentation not typical for ACS and she would be low risk by heart score and outpatient management would be reasonable.  3-hour delta troponin also below level of detection.  Patient understands that ACS cannot be ruled out in the emergency department and options for ongoing testing discussed.  Patient elects to go home with outpatient follow-up.  I feel this is a reasonable approach.  Etiology of chest pain unclear at this time.  Patient feels this could be related to her lymphedema.  Plan for her to follow-up with her primary care physician in 1 to 2 days.  Strict ED return  precautions discussed.  She expresses agreement with and understanding of plan.  Discharged in stable condition.    I have reviewed the nursing notes.         Discharge Medication List as of 11/15/2019  6:24 PM          Final diagnoses:   Chest pain, unspecified type     Sanket Zacarias MD    This document serves as a record of the services and decisions personally performed and made by Sanket Zacarias MD. It was created on HIS/HER behalf by   Cassiyd Chavez, a trained medical scribe. The creation of this document is based the provider's statements to the medical scribe.  Cassidy Chavez 2:52 PM 11/15/2019    Provider:   The information in this document, created by the medical scribe for me, accurately reflects the services I personally performed and the decisions made by me. I have reviewed and approved this document for accuracy prior to leaving the patient care area.  Sanket Zacarias MD 2:52 PM 11/15/2019    11/15/2019   [facility] EMERGENCY DEPARTMENT    Disclaimer: This note consists of words and symbols derived from keyboarding and dictation using voice recognition software.  As a result, there may be errors that have gone undetected.  Please consider this when interpreting information found in this note.     Sanket Zacarias MD  11/19/19 3208

## 2019-11-15 NOTE — ED NOTES
Chief Complaint   Patient presents with    Pressure Behind the Eyes     pt states \"I have reacurring sinus infection\"     Pt has been seen several times both at this office and at [facility]. Pt was given prednisone, inhaler, augmentin. Pt reports that augmentin helped symptoms temporarily, but then they returned. No fever or chills that pt has noticed. Subjective: (As above and below)     Chief Complaint   Patient presents with    Pressure Behind the Eyes     pt states \"I have reacurring sinus infection\"     she is a 80y.o. year old female who presents for evaluation. Reviewed PmHx, RxHx, FmHx, SocHx, AllgHx and updated in chart. Review of Systems - negative except as listed above    Objective:     Vitals:    01/31/19 1428   BP: 134/76   Pulse: 65   Resp: 20   Temp: 97.6 °F (36.4 °C)   TempSrc: Oral   SpO2: 100%   Weight: 174 lb 3.2 oz (79 kg)   Height: 5' 7\" (1.702 m)     Physical Examination: General appearance - alert, well appearing, and in no distress  Mental status - normal mood, behavior, speech, dress, motor activity, and thought processes  Mouth - mucous membranes moist, pharynx normal without lesions  Chest - clear to auscultation, no wheezes, rales or rhonchi, symmetric air entry  Heart - normal rate, regular rhythm, normal S1, S2, no murmurs, rubs, clicks or gallops  Sinusitis - bilateral maxillary tenderness  Musculoskeletal - walks with a cane    Assessment/ Plan:   1. Acute recurrent maxillary sinusitis  -take medication as written  - cefdinir (OMNICEF) 300 mg capsule; Take 1 Cap by mouth two (2) times a day for 10 days. Dispense: 20 Cap; Refill: 0     Follow-up Disposition: As needed  I have discussed the diagnosis with the patient and the intended plan as seen in the above orders. The patient has received an after-visit summary and questions were answered concerning future plans.      Medication Side Effects and Warnings were discussed with patient: yes  Patient Labs were Constant chest pain that radiates to left side of neck/arm and to back and  shortness of breath that started this morning.  Patient took Ranitidine this morning with no improvement.  No heart history.  Patient has lymphedema and uses a machine that wraps around her chest.   Patient felt tightness in her chest after using machine, but went away.  Patient denies cough.     reviewed: yes  Patient Past Records were reviewed:  yes    Herberth Gilliland M.D.

## 2019-11-16 NOTE — DISCHARGE INSTRUCTIONS
Please schedule a follow-up appointment with your primary care provider in 2 to 3 days.  If your chest pain continues, worsens, or you develop new or concerning symptoms, please return to emergency department.

## 2019-12-06 ENCOUNTER — TELEPHONE (OUTPATIENT)
Dept: FAMILY MEDICINE | Facility: CLINIC | Age: 44
End: 2019-12-06

## 2019-12-06 DIAGNOSIS — G89.4 PAIN SYNDROME, CHRONIC: ICD-10-CM

## 2019-12-06 DIAGNOSIS — F41.1 ANXIETY STATE: Primary | ICD-10-CM

## 2019-12-06 DIAGNOSIS — F32.1 MODERATE MAJOR DEPRESSION (H): ICD-10-CM

## 2019-12-06 NOTE — TELEPHONE ENCOUNTER
Called by Dr. Menjivar at [facility] - Palliative care clinic - Doretha was referred there due to complex pain concerns and the recommendation that she may  Need a comprehensive pain rehabilitation program.  Doretha isn't able to go to the three week program at [facility] due to work and being a single mom.     She has been referred to Dr. Jeffry Pardo at [facility] for therapy/chronic pain.     Dr. Menjivar thought she may need to see Psychiatry but she needs a new referral.  Saw Erendira Rene in the past.  May be appropriate for Collaborative Care medication management, but will likely require more frequent visits at first.     There is some concern that her pain is more of a chronic pain syndrome issue at this time and not truly from inflammation.  She seems almost psychologically addicted to the prednisone as they've been unable to wean this down, however the prednisone is causing more side effects (weight gain, depression, etc).      Referral placed for mental health.     Anna Naidu M.D.

## 2019-12-11 DIAGNOSIS — E55.9 VITAMIN D DEFICIENCY: ICD-10-CM

## 2019-12-11 NOTE — TELEPHONE ENCOUNTER
"Requested Prescriptions   Pending Prescriptions Disp Refills     VITAMIN D3 25 MCG (1000 UT) tablet [Pharmacy Med Name: VITAMIN D3 25 MCG(1000 UT) TABS] 270 tablet 0     Sig: TAKE THREE TABLETS BY MOUTH EVERY DAY   Last Written Prescription Date:  7/17/19  Last Fill Quantity: 270 tab,  # refills: 1   Last office visit: 9/24/2019 with prescribing provider:  JACE Lobo   Future Office Visit:   Next 5 appointments (look out 90 days)    Feb 24, 2020  1:00 PM CST  [facility] [address] [address]  [phone]             Vitamin Supplements (Adult) Protocol Passed - 12/11/2019  5:03 AM        Passed - High dose Vitamin D not ordered        Passed - Recent (12 mo) or future (30 days) visit within the authorizing provider's specialty     Patient has had an office visit with the authorizing provider or a provider within the authorizing providers department within the previous 12 mos or has a future within next 30 days. See \"Patient Info\" tab in inbasket, or \"Choose Columns\" in Meds & Orders section of the refill encounter.              Passed - Medication is active on med list          "

## 2019-12-13 ENCOUNTER — OFFICE VISIT (OUTPATIENT)
Dept: FAMILY MEDICINE | Facility: CLINIC | Age: 44
End: 2019-12-13
Payer: COMMERCIAL

## 2019-12-13 ENCOUNTER — RESULT FOLLOW UP (OUTPATIENT)
Dept: FAMILY MEDICINE | Facility: CLINIC | Age: 44
End: 2019-12-13

## 2019-12-13 VITALS
HEIGHT: 63 IN | OXYGEN SATURATION: 95 % | BODY MASS INDEX: 44.46 KG/M2 | TEMPERATURE: 99 F | DIASTOLIC BLOOD PRESSURE: 76 MMHG | HEART RATE: 80 BPM | RESPIRATION RATE: 18 BRPM | SYSTOLIC BLOOD PRESSURE: 118 MMHG

## 2019-12-13 DIAGNOSIS — R87.810 CERVICAL HIGH RISK HPV (HUMAN PAPILLOMAVIRUS) TEST POSITIVE: ICD-10-CM

## 2019-12-13 DIAGNOSIS — N89.8 VAGINAL DISCHARGE: ICD-10-CM

## 2019-12-13 DIAGNOSIS — Z12.4 CERVICAL CANCER SCREENING: ICD-10-CM

## 2019-12-13 DIAGNOSIS — G47.00 PERSISTENT INSOMNIA: ICD-10-CM

## 2019-12-13 DIAGNOSIS — F41.1 ANXIETY STATE: ICD-10-CM

## 2019-12-13 DIAGNOSIS — R10.12 ABDOMINAL PAIN, LEFT UPPER QUADRANT: ICD-10-CM

## 2019-12-13 DIAGNOSIS — R30.0 DYSURIA: ICD-10-CM

## 2019-12-13 DIAGNOSIS — B37.31 YEAST VAGINITIS: Primary | ICD-10-CM

## 2019-12-13 LAB
ALBUMIN UR-MCNC: NEGATIVE MG/DL
APPEARANCE UR: CLEAR
BACTERIA #/AREA URNS HPF: ABNORMAL /HPF
BASOPHILS # BLD AUTO: 0.1 10E9/L (ref 0–0.2)
BASOPHILS NFR BLD AUTO: 0.7 %
BILIRUB UR QL STRIP: NEGATIVE
COLOR UR AUTO: YELLOW
DIFFERENTIAL METHOD BLD: ABNORMAL
EOSINOPHIL # BLD AUTO: 0.1 10E9/L (ref 0–0.7)
EOSINOPHIL NFR BLD AUTO: 0.5 %
ERYTHROCYTE [DISTWIDTH] IN BLOOD BY AUTOMATED COUNT: 13.5 % (ref 10–15)
GLUCOSE UR STRIP-MCNC: NEGATIVE MG/DL
HCT VFR BLD AUTO: 39.9 % (ref 35–47)
HGB BLD-MCNC: 12.5 G/DL (ref 11.7–15.7)
HGB UR QL STRIP: NEGATIVE
IMM GRANULOCYTES # BLD: 0.2 10E9/L (ref 0–0.4)
IMM GRANULOCYTES NFR BLD: 1.1 %
KETONES UR STRIP-MCNC: NEGATIVE MG/DL
LEUKOCYTE ESTERASE UR QL STRIP: ABNORMAL
LYMPHOCYTES # BLD AUTO: 2.5 10E9/L (ref 0.8–5.3)
LYMPHOCYTES NFR BLD AUTO: 18.4 %
MCH RBC QN AUTO: 28.5 PG (ref 26.5–33)
MCHC RBC AUTO-ENTMCNC: 31.3 G/DL (ref 31.5–36.5)
MCV RBC AUTO: 91 FL (ref 78–100)
MONOCYTES # BLD AUTO: 0.8 10E9/L (ref 0–1.3)
MONOCYTES NFR BLD AUTO: 5.8 %
NEUTROPHILS # BLD AUTO: 9.8 10E9/L (ref 1.6–8.3)
NEUTROPHILS NFR BLD AUTO: 73.5 %
NITRATE UR QL: NEGATIVE
NON-SQ EPI CELLS #/AREA URNS LPF: ABNORMAL /LPF
NRBC # BLD AUTO: 0 10*3/UL
NRBC BLD AUTO-RTO: 0 /100
PH UR STRIP: 5.5 PH (ref 5–7)
PLATELET # BLD AUTO: 297 10E9/L (ref 150–450)
RBC # BLD AUTO: 4.39 10E12/L (ref 3.8–5.2)
RBC #/AREA URNS AUTO: ABNORMAL /HPF
SOURCE: ABNORMAL
SP GR UR STRIP: 1.01 (ref 1–1.03)
SPECIMEN SOURCE: ABNORMAL
UROBILINOGEN UR STRIP-ACNC: 0.2 EU/DL (ref 0.2–1)
WBC # BLD AUTO: 13.3 10E9/L (ref 4–11)
WBC #/AREA URNS AUTO: ABNORMAL /HPF
WET PREP SPEC: ABNORMAL

## 2019-12-13 PROCEDURE — 85025 COMPLETE CBC W/AUTO DIFF WBC: CPT | Performed by: FAMILY MEDICINE

## 2019-12-13 PROCEDURE — 36415 COLL VENOUS BLD VENIPUNCTURE: CPT | Performed by: FAMILY MEDICINE

## 2019-12-13 PROCEDURE — 81001 URINALYSIS AUTO W/SCOPE: CPT | Performed by: FAMILY MEDICINE

## 2019-12-13 PROCEDURE — G0145 SCR C/V CYTO,THINLAYER,RESCR: HCPCS | Performed by: FAMILY MEDICINE

## 2019-12-13 PROCEDURE — 87624 HPV HI-RISK TYP POOLED RSLT: CPT | Performed by: FAMILY MEDICINE

## 2019-12-13 PROCEDURE — 99214 OFFICE O/P EST MOD 30 MIN: CPT | Performed by: FAMILY MEDICINE

## 2019-12-13 PROCEDURE — 87210 SMEAR WET MOUNT SALINE/INK: CPT | Performed by: FAMILY MEDICINE

## 2019-12-13 RX ORDER — ALBUTEROL SULFATE 90 UG/1
2 AEROSOL, METERED RESPIRATORY (INHALATION) 4 TIMES DAILY
Qty: 1 INHALER | Refills: 3 | Status: SHIPPED | OUTPATIENT
Start: 2019-12-13 | End: 2020-06-04

## 2019-12-13 RX ORDER — CHOLECALCIFEROL (VITAMIN D3) 25 MCG
TABLET ORAL
Qty: 270 TABLET | Refills: 0 | Status: SHIPPED | OUTPATIENT
Start: 2019-12-13 | End: 2020-03-19

## 2019-12-13 RX ORDER — HYDROXYZINE HYDROCHLORIDE 25 MG/1
25 TABLET, FILM COATED ORAL AT BEDTIME
Qty: 90 TABLET | Refills: 1 | Status: SHIPPED | OUTPATIENT
Start: 2019-12-13 | End: 2020-06-11

## 2019-12-13 RX ORDER — FLUCONAZOLE 150 MG/1
150 TABLET ORAL DAILY
Qty: 3 TABLET | Refills: 0 | Status: SHIPPED | OUTPATIENT
Start: 2019-12-13 | End: 2020-01-06

## 2019-12-13 ASSESSMENT — ASTHMA QUESTIONNAIRES
QUESTION_1 LAST FOUR WEEKS HOW MUCH OF THE TIME DID YOUR ASTHMA KEEP YOU FROM GETTING AS MUCH DONE AT WORK, SCHOOL OR AT HOME: MOST OF THE TIME
QUESTION_2 LAST FOUR WEEKS HOW OFTEN HAVE YOU HAD SHORTNESS OF BREATH: MORE THAN ONCE A DAY
QUESTION_4 LAST FOUR WEEKS HOW OFTEN HAVE YOU USED YOUR RESCUE INHALER OR NEBULIZER MEDICATION (SUCH AS ALBUTEROL): NOT AT ALL
QUESTION_3 LAST FOUR WEEKS HOW OFTEN DID YOUR ASTHMA SYMPTOMS (WHEEZING, COUGHING, SHORTNESS OF BREATH, CHEST TIGHTNESS OR PAIN) WAKE YOU UP AT NIGHT OR EARLIER THAN USUAL IN THE MORNING: NOT AT ALL
ACT_TOTALSCORE: 15
QUESTION_5 LAST FOUR WEEKS HOW WOULD YOU RATE YOUR ASTHMA CONTROL: POORLY CONTROLLED

## 2019-12-13 ASSESSMENT — PAIN SCALES - GENERAL: PAINLEVEL: MODERATE PAIN (4)

## 2019-12-13 NOTE — PROGRESS NOTES
"Subjective     Doretha Fernandez is a 44 year old female who presents to clinic today for the following health issues:    HPI   URINARY TRACT SYMPTOMS  Onset: 1.5 weeks    Description:   Painful urination (Dysuria): YES- a feeling of something is not right           Frequency: YES  Blood in urine (Hematuria): YES- possibly one time  Delay in urine (Hesitency): YES    Intensity: mild    Progression of Symptoms:  worsening    Accompanying Signs & Symptoms:  Fever/chills: YES  Flank pain YES  Nausea and vomiting: YES- nausea  Any vaginal symptoms: vaginal discharge and vaginal odor  Abdominal/Pelvic Pain: YES    History:   History of frequent UTI's: no   History of kidney stones: no   Sexually Active: no   Possibility of pregnancy: No    Precipitating factors:       Therapies Tried and outcome: OTC advil or tylenol     Patient has chronic central pain thought now to be of more of a psychological nature (by pain clinic at [facility]).  Her dose of lyrica was recently increased to 300 mg daily.  She's on 20 mg of prednisone and has gained significant weight on that.  She is down and depressed due to her weight, \"I just don't recognize myself in the mirror anymore\".    She is going to be working with someone at [facility] on the pain and it has also been recommended that she see someone in Psychiatry for medication management for her depression/anxiety.    There is also some left sided abdominal pain x 1.5 weeks. No fever/chills.  Bowels moving normally without blood/hematochezia/melena.    CT scan of the abdomen 3 months ago was normal (no splenomegaly, no pancreatic ductal dilatation, no adrenal nodules).  This was done during a pain flare - indication notes LEFT UPPER QUADRANT pain but she says that she had pain all over and that this pain is different.  She had a PET scan 1 month ago at [facility] which was normal.     We talked at length about radiation risks, repeating CT scan vs continuing to monitor, etc and she is " "comfortable at this time not having a repeat CT scan which I think is reasonable.  WBC is 13,500 with differential pending, but this is approximately what it has been for the past several months on the prednisone and is likely due to the steroid.           Reviewed and updated as needed this visit by Provider         Review of Systems   ROS COMP: Constitutional, HEENT, cardiovascular, pulmonary, gi and gu systems are negative, except as otherwise noted.      Objective    /76   Pulse 80   Temp 99  F (37.2  C) (Tympanic)   Resp 18   Ht 1.588 m (5' 2.5\")   SpO2 95%   Breastfeeding No   BMI 44.46 kg/m    Body mass index is 44.46 kg/m .  Physical Exam   GENERAL: healthy, alert and no distress  NECK: no adenopathy, no asymmetry, masses, or scars and thyroid normal to palpation  RESP: lungs clear to auscultation - no rales, rhonchi or wheezes  CV: regular rate and rhythm, normal S1 S2, no S3 or S4, no murmur, click or rub, no peripheral edema and peripheral pulses strong  ABDOMEN: morbidly obese which limits exam, tenderness LUQ (no rebound, mild guarding) and bowel sounds normal   (female): normal female external genitalia, normal urethral meatus , vaginal mucosa pink, moist, well rugated, vaginal discharge - moderate and white and normal cervix, adnexae, and uterus without masses.  MS: no gross musculoskeletal defects noted, no edema    Diagnostic Test Results:  Labs reviewed in Epic  Results for orders placed or performed in visit on 12/13/19 (from the past 24 hour(s))   *UA reflex to Microscopic and Culture ([place] and [facility] (except [place] and [place])   Result Value Ref Range    Color Urine Yellow     Appearance Urine Clear     Glucose Urine Negative NEG^Negative mg/dL    Bilirubin Urine Negative NEG^Negative    Ketones Urine Negative NEG^Negative mg/dL    Specific Gravity Urine 1.010 1.003 - 1.035    Blood Urine Negative NEG^Negative    pH Urine 5.5 5.0 - 7.0 pH    Protein Albumin Urine " "Negative NEG^Negative mg/dL    Urobilinogen Urine 0.2 0.2 - 1.0 EU/dL    Nitrite Urine Negative NEG^Negative    Leukocyte Esterase Urine Small (A) NEG^Negative    Source Midstream Urine    Urine Microscopic   Result Value Ref Range    WBC Urine 0 - 5 OTO5^0 - 5 /HPF    RBC Urine O - 2 OTO2^O - 2 /HPF    Squamous Epithelial /LPF Urine Moderate (A) FEW^Few /LPF    Bacteria Urine Moderate (A) NEG^Negative /HPF   Wet prep   Result Value Ref Range    Specimen Description Vagina     Wet Prep No Trichomonas seen     Wet Prep No clue cells seen     Wet Prep No WBC's seen     Wet Prep Moderate  Yeast seen   (A)            Assessment & Plan       ICD-10-CM    1. Yeast vaginitis B37.3 fluconazole (DIFLUCAN) 150 MG tablet   2. Dysuria R30.0 *UA reflex to Microscopic and Culture ([place] and [facility] (except [place] and [place])     Urine Microscopic   3. Cervical cancer screening Z12.4 Pap imaged thin layer screen with HPV - recommended age 30 - 65 years (select HPV order below)     HPV High Risk Types DNA Cervical   4. Anxiety state F41.1    5. Persistent insomnia G47.00 hydrOXYzine (ATARAX) 25 MG tablet   6. Vaginal discharge N89.8 Wet prep   7. Abdominal pain, left upper quadrant R10.12 CBC with platelets differential     See discussion above on the pain.     Yeast infection - given prednisone, length of symptoms - will treat x 3 days, suspect one day will not be enough.    See discussion on the abdominal pain, leukocytosis above.  Hold on further imaging at this time.     Pap obtained today as it was due in 3 months and we were doing a speculum exam.       BMI:   Estimated body mass index is 44.46 kg/m  as calculated from the following:    Height as of this encounter: 1.588 m (5' 2.5\").    Weight as of 11/15/19: 112 kg (247 lb).   Weight management plan: Discussed healthy diet and exercise guidelines            No follow-ups on file.    Anna Naidu MD  [facility]        "

## 2019-12-13 NOTE — PATIENT INSTRUCTIONS
Our Clinic hours are:  Mondays    7:20 am - 7 pm  Tues -  Fri  7:20 am - 5 pm    Clinic Phone: [phone]    The clinic lab opens at 7:30 am Mon - Fri and appointments are required.    [facility]. [phone]  Monday  8 am - 7pm  Tues - Fri 8 am - 5:30 pm

## 2019-12-14 ASSESSMENT — ASTHMA QUESTIONNAIRES: ACT_TOTALSCORE: 15

## 2019-12-16 ENCOUNTER — HOSPITAL ENCOUNTER (OUTPATIENT)
Dept: CT IMAGING | Facility: CLINIC | Age: 44
Discharge: HOME | End: 2019-12-16
Attending: FAMILY MEDICINE | Admitting: FAMILY MEDICINE
Payer: COMMERCIAL

## 2019-12-16 ENCOUNTER — TELEPHONE (OUTPATIENT)
Dept: FAMILY MEDICINE | Facility: CLINIC | Age: 44
End: 2019-12-16

## 2019-12-16 DIAGNOSIS — R10.12 LUQ ABDOMINAL PAIN: Primary | ICD-10-CM

## 2019-12-16 DIAGNOSIS — R10.12 LUQ ABDOMINAL PAIN: ICD-10-CM

## 2019-12-16 PROCEDURE — 74177 CT ABD & PELVIS W/CONTRAST: CPT

## 2019-12-16 PROCEDURE — 25000125 ZZHC RX 250: Performed by: FAMILY MEDICINE

## 2019-12-16 PROCEDURE — 25000128 H RX IP 250 OP 636: Performed by: FAMILY MEDICINE

## 2019-12-16 RX ORDER — IOPAMIDOL 755 MG/ML
100 INJECTION, SOLUTION INTRAVASCULAR ONCE
Status: COMPLETED | OUTPATIENT
Start: 2019-12-16 | End: 2019-12-16

## 2019-12-16 RX ADMIN — IOPAMIDOL 100 ML: 755 INJECTION, SOLUTION INTRAVENOUS at 17:12

## 2019-12-16 RX ADMIN — SODIUM CHLORIDE 70 ML: 9 INJECTION, SOLUTION INTRAVENOUS at 17:12

## 2019-12-16 NOTE — TELEPHONE ENCOUNTER
Reason for Call:  Other     Detailed comments: Pt is calling and reporting:  Still has symptoms of L side aches and diarrhea.  Was seen at the clinic on 12/13/19. When seen talked about a possible CT Scan.  Please advise    Phone Number Patient can be reached at: Cell number on file:    Telephone Information:   Mobile [phone]       Best Time: any    Can we leave a detailed message on this number? YES    Call taken on 12/16/2019 at 7:44 AM by Floridalma Ortega

## 2019-12-16 NOTE — TELEPHONE ENCOUNTER
Pt was seen on 12/13/19 for L sided abd pain x 1.5 weeks with plan to call back with worsening symptoms.  L sided abd pain with diarrhea continues/worse.  No n/v,no fever,urinating ok, diarrhea after eating,Hx of colitis with hospitalization 6 months ago,CT was discussed @ appt.  Advise.  Lauren

## 2019-12-17 LAB
COPATH REPORT: NORMAL
PAP: NORMAL

## 2019-12-18 LAB
FINAL DIAGNOSIS: ABNORMAL
HPV HR 12 DNA CVX QL NAA+PROBE: NEGATIVE
HPV16 DNA SPEC QL NAA+PROBE: POSITIVE
HPV18 DNA SPEC QL NAA+PROBE: NEGATIVE
SPECIMEN DESCRIPTION: ABNORMAL
SPECIMEN SOURCE CVX/VAG CYTO: ABNORMAL

## 2019-12-19 NOTE — PROGRESS NOTES
2011 NIL pap.  2015 NIL pap, Neg HPV.  12/13/19 NIL pap , + HR HPV 16. Plan colp. (MRA)  1/6/20 Failed colp exam secondary to anatomic constraints with gyn. Referred to gyn/onc. (mickie) Telephone encounter sent to gyn/onc for transfer of care (mickie)  1/8/20 gyn/onc viewed encounter and closed. (mickie)

## 2020-01-06 ENCOUNTER — OFFICE VISIT (OUTPATIENT)
Dept: OBGYN | Facility: CLINIC | Age: 45
End: 2020-01-06
Payer: COMMERCIAL

## 2020-01-06 VITALS
DIASTOLIC BLOOD PRESSURE: 81 MMHG | BODY MASS INDEX: 45.18 KG/M2 | HEART RATE: 97 BPM | HEIGHT: 63 IN | WEIGHT: 255 LBS | SYSTOLIC BLOOD PRESSURE: 117 MMHG | OXYGEN SATURATION: 99 %

## 2020-01-06 DIAGNOSIS — B97.7 HPV IN FEMALE: Primary | ICD-10-CM

## 2020-01-06 DIAGNOSIS — Z32.00 PREGNANCY EXAMINATION OR TEST, PREGNANCY UNCONFIRMED: ICD-10-CM

## 2020-01-06 LAB — HCG UR QL: NEGATIVE

## 2020-01-06 PROCEDURE — 81025 URINE PREGNANCY TEST: CPT | Performed by: OBSTETRICS & GYNECOLOGY

## 2020-01-06 PROCEDURE — 99207 ZZC NON-BILLABLE SERV PER CHARTING: CPT | Performed by: OBSTETRICS & GYNECOLOGY

## 2020-01-06 ASSESSMENT — MIFFLIN-ST. JEOR: SCORE: 1772.86

## 2020-01-06 NOTE — PROGRESS NOTES
"INDICATION: Doretha Fernandez is a 44 year old female who presents for colposcopy.  Pap smear was reported as NIL, HPV 16 +.  We discussed cervical dysplasia, degrees of dysplasia, options of management.  We discussed high-grade MELYSSA, cervical cancer, possible progression of disease into invasive cervical cancer.  We discussed HPV.     She has been treated for metastatic melanoma, has immunosuppressed status.        Informed consent obtained for procedure.      External genitalia appeared normal.  Harris speculum was placed in the vagina, but the cervix appeared almost flush with the vaginal wall, posterior into the left, and could not be adequately visualized.  She experienced considerable discomfort with the speculum examination.  No further procedure was attempted.    ASSESSMENT:  NIL pap, HPV 16 +.  Failed colposcopy secondary to anatomic constraints.    PLAN: We discussed the options.  I have told her I will code this as a \"no charge\" visit, and instead suggest that she schedule colposcopy with Dr. Melina Cruz at the [facility].  We discussed Dr. Cruz's dysplasia clinic.  She is in agreement with plan.               "

## 2020-01-06 NOTE — PATIENT INSTRUCTIONS
Colposcopy Post-Procedure Patient Instructions      You may experience any of the following for a couple of days following colposcopy:    Mild cramping    Vaginal bleeding     Vaginal discharge that looks black and clumpy    Please call your healthcare provider if you have any of the following symptoms:    Fever--Temperature greater than 100 degrees    Cramping after 48 hours    Bleeding heavier than a normal period in the first 24-48 hours    If you are bleeding and soaking more than one pad an hour    Or any other worrisome problems.    We recommend that:    You use pads, not tampons for the bleeding.    You may resume sexual activity in 2-3 days, or after bleeding stops.    You may use Tylenol or ibuprofen (Motrin or Advil) for any discomfort.      [facility] - OB/GYN : [phone]

## 2020-01-07 ENCOUNTER — TELEPHONE (OUTPATIENT)
Dept: OBGYN | Facility: CLINIC | Age: 45
End: 2020-01-07

## 2020-01-07 PROBLEM — R87.810 CERVICAL HIGH RISK HPV (HUMAN PAPILLOMAVIRUS) TEST POSITIVE: Status: ACTIVE | Noted: 2019-12-13

## 2020-01-07 NOTE — TELEPHONE ENCOUNTER
ONCOLOGY INTAKE: Records Information      APPT INFORMATION:  Referring provider:  Chloé Burr MD  Referring provider s clinic:  [facility]  Reason for visit/diagnosis:  Cervical Dysplasia  Has patient been notified of appointment date and time?: Yes    RECORDS INFORMATION:  Were the records received with the referral (via [employer])? No, Internal Referral    Has patient been seen for any external appt for this diagnosis? No    If yes, where? NA    Has patient had any imaging or procedures outside of [facility] for this condition? NO      If Yes, where? NA    ADDITIONAL INFORMATION:

## 2020-01-07 NOTE — TELEPHONE ENCOUNTER
"**  Forwarding chart as FYI to Gynecology Oncology team for ongoing care **    Patient previously followed by [facility], now being followed by Gyn Onc.  Last visit with Gyn Onc: NONE  Future visit scheduled: 1/28/20      [employer] Pap tracking will be closed for this pt once this message appears \"viewed\" by the Gyn Onc team.    Thank you,  Emilia Awan RN  Pap Tracking       "

## 2020-01-27 ENCOUNTER — OFFICE VISIT (OUTPATIENT)
Dept: FAMILY MEDICINE | Facility: CLINIC | Age: 45
End: 2020-01-27
Payer: COMMERCIAL

## 2020-01-27 ENCOUNTER — TELEPHONE (OUTPATIENT)
Dept: FAMILY MEDICINE | Facility: CLINIC | Age: 45
End: 2020-01-27

## 2020-01-27 VITALS
RESPIRATION RATE: 18 BRPM | OXYGEN SATURATION: 98 % | DIASTOLIC BLOOD PRESSURE: 80 MMHG | BODY MASS INDEX: 44.3 KG/M2 | SYSTOLIC BLOOD PRESSURE: 122 MMHG | WEIGHT: 250 LBS | HEART RATE: 94 BPM | TEMPERATURE: 98.3 F | HEIGHT: 63 IN

## 2020-01-27 DIAGNOSIS — E13.9 DIABETES MELLITUS, IATROGENIC (H): ICD-10-CM

## 2020-01-27 DIAGNOSIS — D72.828 CHRONIC NEUTROPHILIA: ICD-10-CM

## 2020-01-27 DIAGNOSIS — I89.0 SECONDARY LYMPHEDEMA: ICD-10-CM

## 2020-01-27 DIAGNOSIS — G47.33 OSA (OBSTRUCTIVE SLEEP APNEA): ICD-10-CM

## 2020-01-27 DIAGNOSIS — R73.9 HYPERGLYCEMIA: ICD-10-CM

## 2020-01-27 DIAGNOSIS — E66.01 MORBID OBESITY (H): ICD-10-CM

## 2020-01-27 DIAGNOSIS — F32.0 MILD MAJOR DEPRESSION (H): ICD-10-CM

## 2020-01-27 DIAGNOSIS — R53.83 FATIGUE, UNSPECIFIED TYPE: Primary | ICD-10-CM

## 2020-01-27 PROBLEM — C43.9 MELANOMA (H): Status: RESOLVED | Noted: 2017-10-23 | Resolved: 2020-01-27

## 2020-01-27 PROBLEM — J11.1 INFLUENZA-LIKE ILLNESS: Status: RESOLVED | Noted: 2018-05-06 | Resolved: 2020-01-27

## 2020-01-27 LAB
ALBUMIN SERPL-MCNC: 3.6 G/DL (ref 3.4–5)
ALP SERPL-CCNC: 65 U/L (ref 40–150)
ALT SERPL W P-5'-P-CCNC: 34 U/L (ref 0–50)
ANION GAP SERPL CALCULATED.3IONS-SCNC: 11 MMOL/L (ref 3–14)
AST SERPL W P-5'-P-CCNC: 15 U/L (ref 0–45)
BASOPHILS # BLD AUTO: 0 10E9/L (ref 0–0.2)
BASOPHILS NFR BLD AUTO: 0 %
BILIRUB SERPL-MCNC: 0.3 MG/DL (ref 0.2–1.3)
BUN SERPL-MCNC: 10 MG/DL (ref 7–30)
CALCIUM SERPL-MCNC: 9.7 MG/DL (ref 8.5–10.1)
CHLORIDE SERPL-SCNC: 103 MMOL/L (ref 94–109)
CO2 SERPL-SCNC: 21 MMOL/L (ref 20–32)
CREAT SERPL-MCNC: 0.78 MG/DL (ref 0.52–1.04)
DIFFERENTIAL METHOD BLD: ABNORMAL
EOSINOPHIL # BLD AUTO: 0.1 10E9/L (ref 0–0.7)
EOSINOPHIL NFR BLD AUTO: 1 %
ERYTHROCYTE [DISTWIDTH] IN BLOOD BY AUTOMATED COUNT: 13.9 % (ref 10–15)
GFR SERPL CREATININE-BSD FRML MDRD: >90 ML/MIN/{1.73_M2}
GLUCOSE SERPL-MCNC: 156 MG/DL (ref 70–99)
HBA1C MFR BLD: 6.6 % (ref 0–5.6)
HCT VFR BLD AUTO: 42.9 % (ref 35–47)
HGB BLD-MCNC: 14 G/DL (ref 11.7–15.7)
LYMPHOCYTES # BLD AUTO: 6.9 10E9/L (ref 0.8–5.3)
LYMPHOCYTES NFR BLD AUTO: 46 %
MCH RBC QN AUTO: 29.2 PG (ref 26.5–33)
MCHC RBC AUTO-ENTMCNC: 32.6 G/DL (ref 31.5–36.5)
MCV RBC AUTO: 89 FL (ref 78–100)
MONOCYTES # BLD AUTO: 0.9 10E9/L (ref 0–1.3)
MONOCYTES NFR BLD AUTO: 6 %
NEUTROPHILS # BLD AUTO: 7 10E9/L (ref 1.6–8.3)
NEUTROPHILS NFR BLD AUTO: 47 %
PLATELET # BLD AUTO: 333 10E9/L (ref 150–450)
PLATELET # BLD EST: ABNORMAL 10*3/UL
POTASSIUM SERPL-SCNC: 3.5 MMOL/L (ref 3.4–5.3)
PROT SERPL-MCNC: 7.5 G/DL (ref 6.8–8.8)
RBC # BLD AUTO: 4.8 10E12/L (ref 3.8–5.2)
RBC MORPH BLD: NORMAL
RETICS # AUTO: 83.2 10E9/L (ref 25–95)
RETICS/RBC NFR AUTO: 1.7 % (ref 0.5–2)
SODIUM SERPL-SCNC: 135 MMOL/L (ref 133–144)
TSH SERPL DL<=0.005 MIU/L-ACNC: 1.9 MU/L (ref 0.4–4)
WBC # BLD AUTO: 14.9 10E9/L (ref 4–11)

## 2020-01-27 PROCEDURE — 85045 AUTOMATED RETICULOCYTE COUNT: CPT | Performed by: FAMILY MEDICINE

## 2020-01-27 PROCEDURE — 36415 COLL VENOUS BLD VENIPUNCTURE: CPT | Performed by: FAMILY MEDICINE

## 2020-01-27 PROCEDURE — 85060 BLOOD SMEAR INTERPRETATION: CPT | Performed by: FAMILY MEDICINE

## 2020-01-27 PROCEDURE — 99214 OFFICE O/P EST MOD 30 MIN: CPT | Performed by: FAMILY MEDICINE

## 2020-01-27 PROCEDURE — 82306 VITAMIN D 25 HYDROXY: CPT | Performed by: FAMILY MEDICINE

## 2020-01-27 PROCEDURE — 80050 GENERAL HEALTH PANEL: CPT | Performed by: FAMILY MEDICINE

## 2020-01-27 PROCEDURE — 83036 HEMOGLOBIN GLYCOSYLATED A1C: CPT | Performed by: FAMILY MEDICINE

## 2020-01-27 RX ORDER — TOPIRAMATE 50 MG/1
50 TABLET, FILM COATED ORAL EVERY MORNING
Qty: 1 TABLET | Refills: 0 | COMMUNITY
Start: 2020-01-27 | End: 2020-02-12

## 2020-01-27 ASSESSMENT — MIFFLIN-ST. JEOR: SCORE: 1750.18

## 2020-01-27 NOTE — PATIENT INSTRUCTIONS
Health Maintenance reviewed and plan for update discussed.  Patient asked to schedule her mammogram [phone]

## 2020-01-27 NOTE — TELEPHONE ENCOUNTER
S-(situation): Patient is more fatigue in the last 2 weeks.    B-(background): The patient has fatigue for months but it is getting worse.    A-(assessment): The patient states the her fatigue is getting progressively worse. The patient has no energy.  The patient state it takes effort to get off the couch.  The patient will fall asleep on the couch and chair. The patient has forced herself to go to work.  The patient CPAP machine at night. The patient does not believe to be any medications as she has been having this issue for a long time.     R-(recommendations): The patient was scheduled for appt today.  Patient agrees with the plan.     Thank you    Polly BAJWA RN

## 2020-01-27 NOTE — PROGRESS NOTES
"Subjective     Doretha Fernandez is a 44 year old female who presents to clinic today for the following health issues:    HPI   Chief Complaint   Patient presents with     Fatigue     worse in the last 3 weeks. hx of mono 2 years ago. ? due to medication change 2 weeks ago. SOA, weight gain, \"lymphedema is out of control\"     Patient is here today due to overwhelming fatigue.  Recently saw a psychologist and NP pain specialist at [facility] and Pain specialist - switched the Lyrica to Topamax a few weeks ago, unfortunately the fatigue has been going on much longer than that.     Friday was so fatigued that she nodded off on her way home from her gynecologist appointment.  Fell asleep within minutes of making the phone call to come here today.        She started hydroxyzine due to anxiety with her CPAP mask.      Has psychology appointment again tomorrow. Since going off the lyrica and starting the Topamax she feels like she has \"brain fog\" and is not able to remember things anymore.      [facility] wanted her to do a three week intensive program for pain but she wasn't able to do three weeks at [facility].  She may reconsider and try to do this program now that she has more FMLA time after the first of the year.      Still on 20 mg prednisone daily. No fever/chills.  WBC is still mildly elevated, but this is overall unchanged.  I suspect this is due to the chronic steroid use, but will get a peripheral smear.  Her Rheumatologist at [facility] is working to try to get her off the prednisone, but when tapering her off of this, she's had pain that has prohibited further tapering.  Unfortunately the chronic prednisone has caused weight gain, cushinoid appearance, led to sleep apnea with her weight gain, etc.      She uses a nasal mask for her sleep apnea.  She has not called the sleep clinic with the concerns of hypersomnia.  She notes that her daughter tells her she is still snoring.  She doesn't wake feeling refreshed.  As above, " "nodding off while driving.      Reviewed and updated as needed this visit by Provider  Problems         Review of Systems   ROS COMP: Constitutional, HEENT, cardiovascular, pulmonary, gi and gu systems are negative, except as otherwise noted.      Objective    /80   Pulse 94   Temp 98.3  F (36.8  C) (Tympanic)   Resp 18   Ht 1.588 m (5' 2.5\")   Wt 113.4 kg (250 lb)   SpO2 98%   BMI 45.00 kg/m    Body mass index is 45 kg/m .  Physical Exam   GENERAL: alert and moderate distress  NECK: no adenopathy, no asymmetry, masses, or scars and thyroid normal to palpation  RESP: lungs clear to auscultation - no rales, rhonchi or wheezes  CV: regular rate and rhythm, normal S1 S2, no S3 or S4, no murmur, click or rub, no peripheral edema and peripheral pulses strong  ABDOMEN: soft, nontender, no hepatosplenomegaly, no masses and bowel sounds normal  MS: left upper extremity lymphedema  PSYCH: mentation appears normal, tearful, anxious, judgement and insight intact and appearance well groomed    Diagnostic Test Results:  Labs reviewed in Epic        Assessment & Plan       ICD-10-CM    1. Fatigue, unspecified type R53.83 Vitamin D Deficiency     Comprehensive metabolic panel     CBC with platelets differential     TSH with free T4 reflex   2. Mild major depression (H) F32.0    3. Secondary lymphedema I89.0    4. Chronic neutrophilia D72.828 Blood Morphology Pathologist Review     Reticulocyte Count   5. Morbid obesity (H) E66.01    6. STORMY (obstructive sleep apnea) G47.33    etiology of the fatigue is uncertain, though could be multifactorial: medications, depression, STORMY.  I will get some basic labs (even though she's had lab in the past few months), she feels this is a marked change in the past several weeks.  I suspect some of this is medication related (recent change from lyrica to topamax) and some of this is likely under treated depression (no recent change in her venlafaxine).  I have asked her to call her " sleep clinic and see if they can offer some advice on the hypersomnia.  Is her STORMY under treated?  Does she need a stimulant - almost sounds as though there could be some narcolepsy.     Continue care with the pain clinic, consider the intensive program at [facility].           No follow-ups on file.    Anna Naidu MD  [facility]

## 2020-01-27 NOTE — TELEPHONE ENCOUNTER
Reason for call:  Patient reporting a symptom    Symptom or request: Patient has been very tired stating that she is scared to drive some times because she might fall asleep . She did say that she started a new pain clinic and new medications but she said she was tired before that also.    Duration (how long have symptoms been present): 2 weeks    Have you been treated for this before? No    Phone Number patient can be reached at:  Home number on file [phone] (home)    Best Time:  any    Can we leave a detailed message on this number:  YES    Call taken on 1/27/2020 at 8:04 AM by Vonda Champagne

## 2020-01-28 ENCOUNTER — HOSPITAL ENCOUNTER (OUTPATIENT)
Facility: AMBULATORY SURGERY CENTER | Age: 45
End: 2020-01-28
Attending: OBSTETRICS & GYNECOLOGY | Admitting: OBSTETRICS & GYNECOLOGY
Payer: COMMERCIAL

## 2020-01-28 ENCOUNTER — PRE VISIT (OUTPATIENT)
Dept: ONCOLOGY | Facility: CLINIC | Age: 45
End: 2020-01-28

## 2020-01-28 ENCOUNTER — TELEPHONE (OUTPATIENT)
Dept: ONCOLOGY | Facility: CLINIC | Age: 45
End: 2020-01-28

## 2020-01-28 ENCOUNTER — PREP FOR PROCEDURE (OUTPATIENT)
Dept: ONCOLOGY | Facility: CLINIC | Age: 45
End: 2020-01-28

## 2020-01-28 DIAGNOSIS — B97.7 HIGH RISK HPV INFECTION: Primary | ICD-10-CM

## 2020-01-28 DIAGNOSIS — Z01.818 PRE-OP EXAM: Primary | ICD-10-CM

## 2020-01-28 DIAGNOSIS — D84.9 IMMUNOSUPPRESSION (H): ICD-10-CM

## 2020-01-28 DIAGNOSIS — B97.7 HIGH RISK HPV INFECTION: ICD-10-CM

## 2020-01-28 PROBLEM — E66.01 MORBID OBESITY (H): Chronic | Status: ACTIVE | Noted: 2019-09-05

## 2020-01-28 PROBLEM — G47.33 OSA (OBSTRUCTIVE SLEEP APNEA): Chronic | Status: ACTIVE | Noted: 2020-01-27

## 2020-01-28 PROBLEM — C43.9 METASTATIC MALIGNANT MELANOMA (H): Chronic | Status: ACTIVE | Noted: 2017-10-10

## 2020-01-28 PROBLEM — E13.9: Status: ACTIVE | Noted: 2020-01-28

## 2020-01-28 PROBLEM — E13.9: Chronic | Status: ACTIVE | Noted: 2020-01-28

## 2020-01-28 LAB — DEPRECATED CALCIDIOL+CALCIFEROL SERPL-MC: 44 UG/L (ref 20–75)

## 2020-01-28 RX ORDER — METFORMIN HCL 500 MG
500 TABLET, EXTENDED RELEASE 24 HR ORAL
Qty: 90 TABLET | Refills: 1 | Status: SHIPPED | OUTPATIENT
Start: 2020-01-28 | End: 2020-02-07

## 2020-01-28 NOTE — PROGRESS NOTES
Orders placed for OR on 2/19/20 at [facility]    Procedure: EXAM UNDER ANESTHESIA, PELVIS.  COLPOSCOPY WITH CERVICAL BIOPSIES    Indication:  High risk HPV infection    Needs to see PAC, no preop labs needed, consent day of surgery    Patient seen at [facility]    Melina Jung MD  Gynecologic Oncology  [facility]

## 2020-01-28 NOTE — TELEPHONE ENCOUNTER
I scheduled surgery for this patient with Dr. Jung on 2/19 at the [facility]. Scheduled per MD orders.    I called the patient and was able to confirm the scheduled dates.     The RN has completed the education regarding the surgery, and they were provided a surgery packet with instructions and a map.     They are aware that they will get their exact times at their appointment with PAC.    PAC/H&P: 2/10 at 11:15 AM     Post-Op: 3/10 at 11 AM

## 2020-01-29 ENCOUNTER — MYC MEDICAL ADVICE (OUTPATIENT)
Dept: FAMILY MEDICINE | Facility: CLINIC | Age: 45
End: 2020-01-29

## 2020-01-29 ENCOUNTER — OFFICE VISIT (OUTPATIENT)
Dept: SLEEP MEDICINE | Facility: CLINIC | Age: 45
End: 2020-01-29
Payer: COMMERCIAL

## 2020-01-29 VITALS
OXYGEN SATURATION: 94 % | HEIGHT: 63 IN | WEIGHT: 250 LBS | SYSTOLIC BLOOD PRESSURE: 122 MMHG | BODY MASS INDEX: 44.3 KG/M2 | DIASTOLIC BLOOD PRESSURE: 81 MMHG | HEART RATE: 96 BPM

## 2020-01-29 DIAGNOSIS — G47.33 OBSTRUCTIVE SLEEP APNEA: Primary | ICD-10-CM

## 2020-01-29 PROBLEM — C43.62 MALIGNANT MELANOMA OF LEFT UPPER EXTREMITY INCLUDING SHOULDER (H): Chronic | Status: ACTIVE | Noted: 2017-10-12

## 2020-01-29 PROCEDURE — 99213 OFFICE O/P EST LOW 20 MIN: CPT | Performed by: PHYSICIAN ASSISTANT

## 2020-01-29 ASSESSMENT — MIFFLIN-ST. JEOR
SCORE: 1750.18
SCORE: 1750.18

## 2020-01-29 NOTE — TELEPHONE ENCOUNTER
FUTURE VISIT INFORMATION      SURGERY INFORMATION:    Date: 20    Location: [facility] OR    Surgeon:  Melina Jung    Anesthesia Type:  General    Procedure: EXAM UNDER ANESTHESIA, PELVIS.  COLPOSCOPY WITH CERVICAL BIOPSIES    Consult: Prep for procedure 20    RECORDS REQUESTED FROM:       Primary Care Provider: Carola Giron    Pertinent Medical History: STORMY    Most recent EKG+ Tracin/15/19    Most recent ECHO: 2008

## 2020-01-29 NOTE — NURSING NOTE
"Chief Complaint   Patient presents with     Sleep Problem     snoring with mask on ands feeling closterphobic with mask on .        Initial /81   Pulse 96   Ht 1.588 m (5' 2.5\")   Wt 113.4 kg (250 lb)   SpO2 94%   BMI 45.00 kg/m   Estimated body mass index is 45 kg/m  as calculated from the following:    Height as of this encounter: 1.588 m (5' 2.5\").    Weight as of this encounter: 113.4 kg (250 lb).    Medication Reconciliation: complete    Neck circumference: 18.75 inches / 48 centimeters.    DME: [facility]    "

## 2020-01-29 NOTE — PATIENT INSTRUCTIONS
Your BMI is Body mass index is 45 kg/m .  Weight management is a personal decision.  If you are interested in exploring weight loss strategies, the following discussion covers the approaches that may be successful. Body mass index (BMI) is one way to tell whether you are at a healthy weight, overweight, or obese. It measures your weight in relation to your height.  A BMI of 18.5 to 24.9 is in the healthy range. A person with a BMI of 25 to 29.9 is considered overweight, and someone with a BMI of 30 or greater is considered obese. More than two-thirds of American adults are considered overweight or obese.  Being overweight or obese increases the risk for further weight gain. Excess weight may lead to heart disease and diabetes.  Creating and following plans for healthy eating and physical activity may help you improve your health.  Weight control is part of healthy lifestyle and includes exercise, emotional health, and healthy eating habits. Careful eating habits lifelong are the mainstay of weight control. Though there are significant health benefits from weight loss, long-term weight loss with diet alone may be very difficult to achieve- studies show long-term success with dietary management in less than 10% of people. Attaining a healthy weight may be especially difficult to achieve in those with severe obesity. In some cases, medications, devices and surgical management might be considered.  What can you do?  If you are overweight or obese and are interested in methods for weight loss, you should discuss this with your provider.     Consider reducing daily calorie intake by 500 calories.     Keep a food journal.     Avoiding skipping meals, consider cutting portions instead.    Diet combined with exercise helps maintain muscle while optimizing fat loss. Strength training is particularly important for building and maintaining muscle mass. Exercise helps reduce stress, increase energy, and improves fitness.  Increasing exercise without diet control, however, may not burn enough calories to loose weight.       Start walking three days a week 10-20 minutes at a time    Work towards walking thirty minutes five days a week     Eventually, increase the speed of your walking for 1-2 minutes at time    In addition, we recommend that you review healthy lifestyles and methods for weight loss available through the National Institutes of Health patient information sites:  http://win.niddk.nih.gov/publications/index.htm    And look into health and wellness programs that may be available through your health insurance provider, employer, local community center, or [employer].    Weight management plan: Patient was referred to their PCP to discuss a diet and exercise plan.       Please be advised to avoid the dangers of driving while excessively drowsy/sleepy

## 2020-01-29 NOTE — PROGRESS NOTES
"  Sleep Consultation:    Date on this visit: 1/29/2020    Doretha Fernandez is sent by No ref. provider found for a sleep consultation regarding.    Primary Physician: Carola Giron     Chief Complaint   Patient presents with     Sleep Problem     snoring with mask on ands feeling closterphobic with mask on .      Doretha Fernandez is a 44 year old female who had a sleep study for snoring, daytime sleepiness and abnormal overnight oximetry. Polysomnogram data obtained. The test was done in 1/2019 (241#)-AHI 24, lowest oxygen saturation was 86%, no periodic limb movement were noted, CPAP 8 cm/H20 was effective.  She was diagnosed with moderate STORMY and has been on CPAP 8cmH2O since January 2019, although she does not like the type of mask she is using.  Her original complaints have improved with treatment, but not close to being remedied regarding daytime sleepiness.      Doretha has gained ~10 pounds since their last sleep study.    Overall, she rates the experience with PAP as 6 (0 poor, 10 great). The mask is not comfortable.  The mask is uncomfortable because of \"anxiety\".  The mask is not leaking.  She is snoring with the mask on. She is having gasp arousals.  She is having significant oral/nasal dryness. The pressure is comfortable.     Her PAP interface is Nasal Pillows.    Bedtime is typically 2300. Usually it takes about 5 min minutes to fall asleep with the mask on. Wake time is typically 0630.  Patient is using PAP therapy 8 hours per night. The patient is usually getting 8 hours of sleep per night.    She does not feel rested in the morning.    Total score - [place]: 20 (1/29/2020  3:33 PM)    [facility]   CPAP 8 cmH2O 30 day usage data:  100% of days with > 4 hours of use. 0/30 days with no use.   Average use 7 hours 19 minutes per day.   95%ile Leak 7.4 L/min.   AHI 0.6 events per hour.     Patient does not use electronics in bed and read in bed.     Doretha does not do shift work.  She is starting " FMLA.    Patient sleeps on her back and side. She has occasional morning headaches and denies morning confusion. Doretha denies any sleep walking, sleep talking, dream enactment, sleep paralysis, cataplexy and hypnogogic/hypnopompic hallucinations.    She reports aches and cramps in bed. She has to get up and move around which gives her temporary relief. She states that when she was using Lyrica for pain, her symptoms resolved. Lyrica was discontinued because it was not helping pain.     Doretha denies difficulty breathing through her nose, claustrophobia and reflux at night.      Doretha naps 1-2 times per week for 30-60 minutes, feels unrefreshed after naps. She takes some inadvertant naps.  She admits closing eyes at stop lights. The most recent episode was 1 month ago.  Patient was counseled on the importance of driving while alert, to pull over if drowsy, or nap before getting into the vehicle if sleepy.      Allergies:    Allergies   Allergen Reactions     Bee Venom Swelling     Azithromycin Diarrhea     Erythromycin      Other reaction(s): GI intolerance, Vomiting     Fentanyl Other (See Comments)     sweating  sweating     Prochlorperazine Fatigue     Other reaction(s): Other (see comments)  Fatigue     Buspirone      Other reaction(s): GI intolerance  vomiting     Erythrocin Nausea and Vomiting     Zithromax [Azithromycin Dihydrate] Diarrhea     Enbrel [Etanercept] Hives and Rash       Medications:    Current Outpatient Medications   Medication Sig Dispense Refill     Acetaminophen (TYLENOL PO) Take 1,000 mg by mouth every 8 hours as needed for mild pain or fever       albuterol (PROAIR HFA/PROVENTIL HFA/VENTOLIN HFA) 108 (90 Base) MCG/ACT inhaler Inhale 2 puffs into the lungs 4 times daily 1 Inhaler 3     Calcium Carbonate (CALCIUM-CARB 600 PO) Take 1 tablet by mouth every morning        fluocinonide (LIDEX) 0.05 % external cream Apply sparingly to affected area twice daily as needed.  Do not apply to face. 120  "g 3     hydrOXYzine (ATARAX) 25 MG tablet Take 1 tablet (25 mg) by mouth At Bedtime 90 tablet 1     metFORMIN (GLUCOPHAGE-XR) 500 MG 24 hr tablet Take 1 tablet (500 mg) by mouth daily (with dinner) 90 tablet 1     ondansetron (ZOFRAN) 8 MG tablet Take by mouth every 8 hours as needed for nausea       order for DME Equipment being ordered: X2 Wearease Compression shirt. 2 each 1     order for DME Equipment being ordered: 20-30 mmHg compression sleeve and 20-30 mmHg compression glove x 2 pair. 2 each 1     order for DME Equipment being ordered: x2 [employer]arease compression shirt 1 each 0     order for DME Equipment being ordered: 20-30mmHg compression sleeve and 20-30mmHg compression glove\" x2 pair 1 Units 0     predniSONE (DELTASONE) 5 MG tablet Take 1 tablet (5 mg) by mouth daily Prednisone taper:  30 mg p.o. daily x7 days.  27.5 mg p.o. daily x7 days.  25 mg p.o. daily x7 days.  22.5 mg p.o. daily x7 days. (Patient taking differently: Take 20 mg by mouth daily Prednisone taper:  30 mg p.o. daily x7 days.  27.5 mg p.o. daily x7 days.  25 mg p.o. daily x7 days.  22.5 mg p.o. daily x7 days.) 147 tablet 0     topiramate (TOPAMAX) 50 MG tablet Take 1 tablet (50 mg) by mouth 2 times daily 1 tablet 0     venlafaxine (EFFEXOR-ER) 225 MG 24 hr tablet Take 1 tablet (225 mg) by mouth daily 90 each 1     VITAMIN D3 25 MCG (1000 UT) tablet TAKE THREE TABLETS BY MOUTH EVERY  tablet 0       Problem List:  Patient Active Problem List    Diagnosis Date Noted     Diabetes mellitus, iatrogenic (H) 01/28/2020     Priority: Medium     High risk HPV infection 01/28/2020     Priority: Medium     Added automatically from request for surgery [id number]       STORMY (obstructive sleep apnea) 01/27/2020     Priority: Medium     Secondary lymphedema 01/27/2020     Priority: Medium     Chronic neutrophilia 01/27/2020     Priority: Medium     Cervical high risk HPV (human papillomavirus) test positive 12/13/2019     Priority: Medium     2011 NIL " pap.  2015 NIL pap, Neg HPV.  12/13/19 NIL pap , + HR HPV 16. Plan colp.   1/6/19 Failed colp exam secondary to anatomic constraints with gyn. Referred to gyn/onc.        Immunosuppressed status (H) 09/05/2019     Priority: Medium     Ulcerative colitis with complication, unspecified location (H) 09/05/2019     Priority: Medium     Morbid obesity (H) 09/05/2019     Priority: Medium     Other chronic pain 05/06/2018     Priority: Medium     Rash and nonspecific skin eruption 04/05/2018     Priority: Medium     Bilateral leg cramps 03/07/2018     Priority: Medium     Intestinal giardiasis 03/05/2018     Priority: Medium     Rectal bleeding 03/04/2018     Priority: Medium     Diarrhea 03/04/2018     Priority: Medium     Colitis 03/01/2018     Priority: Medium     Functional diarrhea 02/22/2018     Priority: Medium     Malignant melanoma of left upper extremity including shoulder (H) 10/12/2017     Priority: Medium     Metastatic malignant melanoma (H) 10/10/2017     Priority: Medium     Prothrombin mutation (H) 04/05/2017     Priority: Medium     On daily aspirin 81 mg per hematology's recommendations from 2008       Vision changes 04/01/2017     Priority: Medium     Mild intermittent asthma without complication 11/08/2013     Priority: Medium     Mild major depression (H) 06/24/2013     Priority: Medium     Anxiety state 09/13/2012     Priority: Medium     Problem list name updated by automated process. Provider to review       Esophageal reflux 09/13/2012     Priority: Medium     DUB (dysfunctional uterine bleeding) 07/28/2011     Priority: Medium     CARDIOVASCULAR SCREENING; LDL GOAL LESS THAN 160 07/28/2011     Priority: Low        Past Medical/Surgical History:  Past Medical History:   Diagnosis Date     Abnormal MRI     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      Anxiety      Basal cell carcinoma      Cervical high risk HPV (human papillomavirus) test positive 12/13/2019    See problem list      Depression      Diabetes mellitus, iatrogenic (H) 2020     Lumbago     left lower back pain     Malignant melanoma (H)      Melanoma (H) 10/23/2017     Mild persistent asthma      Prothrombin deficiency (H)     takes 81mg asa daily     Stroke (cerebrum) (H)     During      TIA (transient ischemic attack)      Past Surgical History:   Procedure Laterality Date     COLONOSCOPY N/A 10/18/2017    Procedure: COLONOSCOPY;  Colon;  Surgeon: Debbie Stephens MD;  Location: [facility] OR     COLONOSCOPY N/A 3/9/2018    Procedure: COMBINED COLONOSCOPY, SINGLE OR MULTIPLE BIOPSY/POLYPECTOMY BY BIOPSY;  colon  ;  Surgeon: Benita Schumacher MD;  Location: [facility] GI     DISSECT LYMPH NODE AXILLA Left 10/23/2017    Procedure: DISSECT LYMPH NODE AXILLA;  Left Axillary Lymph Node Dissection ;  Surgeon: Laurent Cool MD;  Location: [facility] OR     GYN SURGERY           REPAIR MOHS Left 2017    Procedure: REPAIR MOHS;  Left Upper Lid Moh's Reconstruction;  Surgeon: Kisha Bosch MD;  Location: [facility] OR       Social History:  Social History     Socioeconomic History     Marital status:      Spouse name: Not on file     Number of children: Not on file     Years of education: Not on file     Highest education level: Not on file   Occupational History     Not on file   Social Needs     Financial resource strain: Not on file     Food insecurity:     Worry: Not on file     Inability: Not on file     Transportation needs:     Medical: Not on file     Non-medical: Not on file   Tobacco Use     Smoking status: Passive Smoke Exposure - Never Smoker     Smokeless tobacco: Never Used   Substance and Sexual Activity     Alcohol use: No     Comment: occ     Drug use: No     Sexual activity: Not Currently     Partners: Male     Birth control/protection: Surgical   Lifestyle     Physical activity:     Days per week: Not on file     Minutes per session: Not on file     Stress: Not on file   Relationships     Social  connections:     Talks on phone: Not on file     Gets together: Not on file     Attends Sabianist service: Not on file     Active member of club or organization: Not on file     Attends meetings of clubs or organizations: Not on file     Relationship status: Not on file     Intimate partner violence:     Fear of current or ex partner: Not on file     Emotionally abused: Not on file     Physically abused: Not on file     Forced sexual activity: Not on file   Other Topics Concern     Parent/sibling w/ CABG, MI or angioplasty before 65F 55M? No   Social History Narrative    20 y.o- patient's mother   of lung cancer. She had to take care of her younger sister.    2012- patient's  had a heart attack with stents placed, followed by cardiac rehabilitation    2000 TO 2012  was in [facility] psychiatric hospital for depression    2013 patient's  went through alcohol rehabilitation at [facility]            They have attended couple counseling a couple of times and patient went to the family program for chemical dependency.    Patient denies alcohol or drug use and herself            Has 2 children, girls ages 10 and 13     For a while she was working 3 jobs since her  was ill. 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 Reports her job is very stressful.           Family History:  Family History   Problem Relation Age of Onset     Cancer Mother 45        lung     Neurologic Disorder Mother      Lipids Father      Gastrointestinal Disease Father      Depression Father      Cancer Maternal Grandmother      Blood Disease Maternal Grandmother      Arthritis Maternal Grandmother      Diabetes Maternal Grandmother      Depression Maternal Grandmother      Macular Degeneration Maternal Grandmother      Glaucoma Maternal Grandmother      Diabetes Maternal Grandfather      Cerebrovascular Disease Maternal Grandfather      Blood Disease Maternal  "Grandfather      Heart Disease Maternal Grandfather      Glaucoma Maternal Grandfather      Cancer Paternal Grandmother      Cancer - colorectal Paternal Grandmother      Respiratory Paternal Grandfather      Blood Disease Paternal Grandfather      Heart Disease Daughter      Asthma Daughter      Depression Sister      Melanoma No family hx of        Review of Systems:  A complete review of systems reviewed by me is negative with the exeption of what has been mentioned in the history of present illness.  CONSTITUTIONAL:  POSITIVE for  weight gain  EYES: NEGATIVE for changes in vision, blind spots, double vision.  ENT: NEGATIVE for ear pain, sore throat, sinus pain, post-nasal drip, runny nose, bloody nose  CARDIAC: NEGATIVE for fast heartbeats or fluttering in chest, chest pain or pressure, breathlessness when lying flat, swollen legs or swollen feet.  NEUROLOGIC: NEGATIVE headaches, weakness or numbness in the arms or legs.  DERMATOLOGIC: NEGATIVE for rashes, new moles or change in mole(s)  PULMONARY:  POSITIVE for  SOB with activity and NEGATIVE for  SOB at rest and wheezing   GASTROINTESTINAL: NEGATIVE for nausea or vomitting, loose or watery stools, fat or grease in stools, constipation, abdominal pain, bowel movements black in color or blood noted.  GENITOURINARY: NEGATIVE for pain during urination, blood in urine, urinating more frequently than usual, irregular menstrual periods.  MUSCULOSKELETAL:  POSITIVE for  muscle pain, bone or joint pain and swollen joints  ENDOCRINE: NEGATIVE for increased thirst or urination, diabetes.  LYMPHATIC: NEGATIVE for swollen lymph nodes, lumps or bumps in the breasts or nipple discharge.    Physical Examination:  Vitals: /81   Pulse 96   Ht 1.588 m (5' 2.5\")   Wt 113.4 kg (250 lb)   SpO2 94%   BMI 45.00 kg/m    BMI= Body mass index is 45 kg/m .    Neck Cir (cm): 48 cm    [place] Total Score 1/29/2020   Total score - [place] 20       JOSÉ MIGUEL Total Score: 15 (01/29/20 " 1533)    GENERAL APPEARANCE: alert and no distress  EYES: Eyes grossly normal to inspection, PERRL and conjunctivae and sclerae normal  HENT: ear canals and TM's normal, nose and mouth without ulcers or lesions and oropharynx crowded  NECK: no asymmetry, masses, or scars  PSYCH: mentation appears normal and affect normal/bright  Mallampati Class: III.  Tonsillar Stage:    Last Comprehensive Metabolic Panel:  Sodium   Date Value Ref Range Status   01/27/2020 135 133 - 144 mmol/L Final     Potassium   Date Value Ref Range Status   01/27/2020 3.5 3.4 - 5.3 mmol/L Final     Chloride   Date Value Ref Range Status   01/27/2020 103 94 - 109 mmol/L Final     Carbon Dioxide   Date Value Ref Range Status   01/27/2020 21 20 - 32 mmol/L Final     Anion Gap   Date Value Ref Range Status   01/27/2020 11 3 - 14 mmol/L Final     Glucose   Date Value Ref Range Status   01/27/2020 156 (H) 70 - 99 mg/dL Final     Urea Nitrogen   Date Value Ref Range Status   01/27/2020 10 7 - 30 mg/dL Final     Creatinine   Date Value Ref Range Status   01/27/2020 0.78 0.52 - 1.04 mg/dL Final     GFR Estimate   Date Value Ref Range Status   01/27/2020 >90 >60 mL/min/[1.73_m2] Final     Comment:     Non  GFR Calc  Starting 12/18/2018, serum creatinine based estimated GFR (eGFR) will be   calculated using the Chronic Kidney Disease Epidemiology Collaboration   (CKD-EPI) equation.       Calcium   Date Value Ref Range Status   01/27/2020 9.7 8.5 - 10.1 mg/dL Final       Impression/Plan:  1. Moderate obstructive sleep apnea-  Good compliance and AHI appears well controlled on CPAP 8 cm/H20.  Change to auto-CPAP 8-15 cm/H20 for significant snoring and daytime sleepiness.  We discussed PAP titration polysomnogram to establish optimal PAP setting in light of excessive daytime sleepiness, weight gain and potential for hypoventilation.  Patient is not prepared to undergo testing at this time.   Patient referred to [facility] for a  mask fitting.     2. Excessive daytime sleepiness-  Hypersomnolence with multiple causes including medication effect, including use of Topamax.  The patient also may have insufficient sleep and incompletely treated sleep apnea and may have hypersomnolence on the basis of that.   Patient counseled to obtain 8+ hours of sleep nightly, now that she is on FMLA.  Change to auto-CPAP 8-15 cm/H20.  Patient counseled to avoid the dangers of driving while excessively drowsy/sleepy    3.  Restless legs syndrome (RLS), we will check her ferritin and treat with supplemental iron if it is low. We may consider gabapentin or a dopamine agonist if ferritin is normal.      Follow up in 2 months, sooner if any concerns.    Sachin Machado PA-C    CC: No ref. provider found

## 2020-01-30 DIAGNOSIS — G47.33 OBSTRUCTIVE SLEEP APNEA: ICD-10-CM

## 2020-01-30 LAB — FERRITIN SERPL-MCNC: 36 NG/ML (ref 12–150)

## 2020-01-30 PROCEDURE — 36415 COLL VENOUS BLD VENIPUNCTURE: CPT | Performed by: PHYSICIAN ASSISTANT

## 2020-01-30 PROCEDURE — 82728 ASSAY OF FERRITIN: CPT | Performed by: PHYSICIAN ASSISTANT

## 2020-02-02 LAB — COPATH REPORT: NORMAL

## 2020-02-06 ENCOUNTER — TELEPHONE (OUTPATIENT)
Dept: FAMILY MEDICINE | Facility: CLINIC | Age: 45
End: 2020-02-06

## 2020-02-06 ENCOUNTER — MYC MEDICAL ADVICE (OUTPATIENT)
Dept: FAMILY MEDICINE | Facility: CLINIC | Age: 45
End: 2020-02-06

## 2020-02-06 PROBLEM — E83.51 HYPOCALCEMIA: Status: ACTIVE | Noted: 2018-05-15

## 2020-02-06 PROBLEM — C77.3 SECONDARY AND UNSPECIFIED MALIGNANT NEOPLASM OF AXILLA AND UPPER LIMB LYMPH NODES (H): Status: ACTIVE | Noted: 2017-11-07

## 2020-02-06 PROBLEM — B37.0 CANDIDIASIS OF MOUTH: Status: ACTIVE | Noted: 2018-05-10

## 2020-02-06 PROBLEM — R10.9 ABDOMINAL PAIN: Status: ACTIVE | Noted: 2018-05-10

## 2020-02-06 PROBLEM — M06.9 ARTHRITIS, RHEUMATOID (H): Status: ACTIVE | Noted: 2018-11-26

## 2020-02-06 PROBLEM — M25.569 KNEE PAIN: Status: ACTIVE | Noted: 2018-05-12

## 2020-02-06 PROBLEM — R53.81 MALAISE: Status: ACTIVE | Noted: 2018-05-10

## 2020-02-06 PROBLEM — D64.9 ANEMIA: Status: ACTIVE | Noted: 2018-05-14

## 2020-02-06 PROBLEM — N92.6 MENSTRUAL IRREGULARITY: Status: ACTIVE | Noted: 2018-05-14

## 2020-02-06 PROBLEM — R13.10 DYSPHAGIA: Status: ACTIVE | Noted: 2019-01-28

## 2020-02-06 NOTE — TELEPHONE ENCOUNTER
Forms initially completed by Dr. Naidu and called patient for further clarification. Spoke with patient for 10 min, she is very frustrated.  Her pain is affecting her ADLs.  She is unable to work outside the home.  She is having difficulty with basics such as cooking, cleaning and dressing.  She has tried PT in the past but in 2018 - has not had 10-12 sessions in the last year.  Back to Dr. Naidu for signature.    Rupal Fleming RN

## 2020-02-06 NOTE — TELEPHONE ENCOUNTER
Reason for Call:  Form, our goal is to have forms completed with 72 hours, however, some forms may require a visit or additional information.    Type of letter, form or note:  medical - P.A.form for [facility] for [facility]    Who is the form from?: Insurance comp    Where did the form come from: form was faxed in    What clinic location was the form placed at?: [facility]    Where the form was placed: Given to physician    What number is listed as a contact on the form?: [phone]       Additional comments:     Call taken on 2/6/2020 at 7:28 AM by Jena Quintero

## 2020-02-07 ENCOUNTER — ALLIED HEALTH/NURSE VISIT (OUTPATIENT)
Dept: EDUCATION SERVICES | Facility: CLINIC | Age: 45
End: 2020-02-07
Payer: COMMERCIAL

## 2020-02-07 DIAGNOSIS — E11.9 DIABETES MELLITUS WITHOUT COMPLICATION (H): Primary | ICD-10-CM

## 2020-02-07 DIAGNOSIS — E13.9 DIABETES MELLITUS, IATROGENIC (H): ICD-10-CM

## 2020-02-07 PROBLEM — D84.9 IMMUNOSUPPRESSION (H): Status: ACTIVE | Noted: 2020-01-28

## 2020-02-07 PROCEDURE — 99207 ZZC DROP WITH A PROCEDURE: CPT

## 2020-02-07 PROCEDURE — G0108 DIAB MANAGE TRN  PER INDIV: HCPCS

## 2020-02-07 RX ORDER — LANCETS
1 EACH MISCELLANEOUS 2 TIMES DAILY
Qty: 102 EACH | Refills: 11 | Status: SHIPPED | OUTPATIENT
Start: 2020-02-07 | End: 2021-02-18

## 2020-02-07 RX ORDER — METFORMIN HCL 500 MG
1000 TABLET, EXTENDED RELEASE 24 HR ORAL
Qty: 360 TABLET | Refills: 1 | Status: SHIPPED | OUTPATIENT
Start: 2020-02-07 | End: 2021-03-11

## 2020-02-07 NOTE — PROGRESS NOTES
"Diabetes Self-Management Education & Support    Presents for: Initial Assessment for new diagnosis    SUBJECTIVE/OBJECTIVE:  Presents for: Initial Assessment for new diagnosis  Accompanied by: Self  Diabetes education in the past 24mo: No  Focus of Visit: Taking Medication, Healthy Eating, Diabetes Pathophysiology  Diabetes type: Type 2  Disease course: Worsening  Transportation concerns: No  Other concerns:: None  Cultural Influences/Ethnic Background:  American      Diabetes Symptoms & Complications:  Fatigue: Yes  Neuropathy: Yes  Polydipsia: Yes  Polyphagia: Sometimes  Polyuria: No  Visual change: Yes  Slow healing wounds: No  Symptom course: Worsening  Weight trend: Stable       Patient Problem List and Family Medical History reviewed for relevant medical history, current medical status, and diabetes risk factors.    Vitals:  There were no vitals taken for this visit.  Estimated body mass index is 45 kg/m  as calculated from the following:    Height as of 1/29/20: 1.588 m (5' 2.5\").    Weight as of 1/29/20: 113.4 kg (250 lb).   Last 3 BP:   BP Readings from Last 3 Encounters:   01/29/20 122/81   01/27/20 122/80   01/06/20 117/81       History   Smoking Status     Passive Smoke Exposure - Never Smoker     Packs/day: 0.00     Last attempt to quit: 3/20/1998   Smokeless Tobacco     Never Used       Labs:  Lab Results   Component Value Date    A1C 6.6 01/27/2020     Lab Results   Component Value Date     01/27/2020     Lab Results   Component Value Date     07/28/2011     No results found for: HDL]  GFR Estimate   Date Value Ref Range Status   01/27/2020 >90 >60 mL/min/[1.73_m2] Final     Comment:     Non  GFR Calc  Starting 12/18/2018, serum creatinine based estimated GFR (eGFR) will be   calculated using the Chronic Kidney Disease Epidemiology Collaboration   (CKD-EPI) equation.       GFR Estimate If Black   Date Value Ref Range Status   01/27/2020 >90 >60 mL/min/[1.73_m2] Final     " Comment:      GFR Calc  Starting 12/18/2018, serum creatinine based estimated GFR (eGFR) will be   calculated using the Chronic Kidney Disease Epidemiology Collaboration   (CKD-EPI) equation.       Lab Results   Component Value Date    CR 0.78 01/27/2020     No results found for: MICROALBUMIN    Healthy Eating:  Healthy Eating Assessed Today: Yes  Cultural/Orthodoxy diet restrictions?: No  Meal planning/habits: Avoiding sweets  How many times a week on average do you eat food made away from home (restaurant/take-out)?: 2  Breakfast: skips this meal, may have scones or muffins if eating breakfast  Lunch: chicken, or mexican foods,   Dinner: same as lunch, chicken and rice , hamburgers fries, pizza  Snacks: oatmeal and blueberries  Other: baked potatoes  Beverages: Water, Coffee, Tea  Has patient met with a dietitian in the past?: No    Being Active:  Being Active Assessed Today: Yes  Exercise:: Currently not exercising    Monitoring:  Monitoring Assessed Today: Yes  Did patient bring glucose meter to appointment? : No  Blood Glucose Meter: Accu-chek(236 in clinic after snack)    Newly DX    Taking Medications:  Diabetes Medication(s)     Biguanides       metFORMIN (GLUCOPHAGE-XR) 500 MG 24 hr tablet    Take 2 tablets (1,000 mg) by mouth daily (with dinner)          Current Treatments: Oral Medication (taken by mouth)    Problem Solving:  Problem Solving Assessed Today: Yes  Is the patient at risk for hypoglycemia?: No              Reducing Risks:  Has dilated eye exam at least once a year?: Yes  Sees dentist every 6 months?: Yes    Healthy Coping:  Healthy Coping Assessed Today: Yes  Stage of change: PREPARATION (Decided to change - considering how)  Patient Activation Measure Survey Score:  CHITRA Score (Last Two) 10/5/2011   CHITRA Raw Score 40   Activation Score 60   CHITRA Level 3       Diabetes knowledge and skills assessment:   Patient is knowledgeable in diabetes management concepts related to: Taking  Medication    Patient needs further education on the following diabetes management concepts: Healthy Eating, Being Active, Monitoring, Taking Medication and Healthy Coping    Based on learning assessment above, most appropriate setting for further diabetes education would be: Individual setting.      INTERVENTIONS:    Education provided today on:  AADE Self-Care Behaviors:  Diabetes Pathophysiology  Healthy Eating: carbohydrate counting, consistency in amount, composition, and timing of food intake, weight reduction, heart healthy diet, eating out, portion control, plate planning method and label reading  Being Active: relationship to blood glucose and describe appropriate activity program  Monitoring: purpose, proper technique, log and interpret results, individual blood glucose targets and frequency of monitoring  Taking Medication: action of prescribed medication, side effects of prescribed medications and when to take medications  Healthy Coping: recognize feelings about diagnosis, benefits of making appropriate lifestyle changes, utilize support systems, methods for coping with stress and when to seek professional counseling    Opportunities for ongoing education and support in diabetes-self management were discussed.    Pt verbalized understanding of concepts discussed and recommendations provided today.       Education Materials Provided:  [facility] Understanding Diabetes Booklet, Carbohydrate Counting, My Plate Planner and Accu-Chek Guide Me meter kit      ASSESSMENT:  Goals Addressed as of 2/7/2020 at 4:04 PM       Medication 1 (pt-stated)     Added 2/7/20 by Laura Rene, RN     My Goal: I will take the following medications    What I need to meet my goal: 1 . Take Metformin  mg take 1 pill with breakfast and 1 pill with dinner  2. After 7 days then take 2 pills with breakfast and 2 pills with dinner    I plan to meet my goal by this date: 1 months          Monitoring (pt-stated)     Added 2/7/20 by  Truhler, Laura, RN     My Goal: I will check my BG more often    What I need to meet my goal: 1. Check when you wake up ()  2. Check BG 2 hrs after a meal (<180)    I plan to meet my goal by this date: 1 month              Patient's most recent   Lab Results   Component Value Date    A1C 6.6 01/27/2020    is meeting goal of <7.0    PLAN  See Patient Instructions for co-developed, patient-stated behavior change goals.  AVS printed and provided to patient today. See Follow-Up section for recommended follow-up.    Racquel Rene RN/THEE  [facility] Educator    Time Spent: 60 minutes  Encounter Type: Individual    Any diabetes medication dose changes were made via the CDE Protocol and Collaborative Practice Agreement with the patient's primary care provider. A copy of this encounter was shared with the provider.

## 2020-02-07 NOTE — PATIENT INSTRUCTIONS
Diabetes Support Resources:  Look at your goals for your instructions for management     Bring blood glucose meter and logbook with you to all doctor and follow-up appointments.    Diabetes Education Telephone Visit Follow-up:    We realize your time is valuable and your health is important! We offer a convenient Telephone Visit follow up! It s a quick way to check in for a medication dose adjustment without having to come back to clinic as soon.    Telephone Visits are often covered by insurance. Please check with your insurance plan to see if this type of visit is covered. If not, the cost is less expensive than an office visit:      Up to 10 minutes (Code 00700): $30    11-20 minutes (Code 95441): $59    More than 20 minutes (Code 61514): $85    Talk with your Diabetes Educator if you want to learn more.      [facility]:  For Your Diabetes Education and Nutrition Appointments Call:  [phone]   For Diabetes Education or Nutrition Related Questions:   Phone: [phone]  E-mail: [email or web address]  Fax: [phone]   If you need a medication refill please contact your pharmacy. Please allow 3 business days for your refills to be completed.

## 2020-02-10 ENCOUNTER — OFFICE VISIT (OUTPATIENT)
Dept: SURGERY | Facility: CLINIC | Age: 45
End: 2020-02-10
Payer: COMMERCIAL

## 2020-02-10 ENCOUNTER — PRE VISIT (OUTPATIENT)
Dept: SURGERY | Facility: CLINIC | Age: 45
End: 2020-02-10

## 2020-02-10 ENCOUNTER — ANCILLARY PROCEDURE (OUTPATIENT)
Dept: CARDIOLOGY | Facility: CLINIC | Age: 45
End: 2020-02-10
Attending: PHYSICIAN ASSISTANT
Payer: COMMERCIAL

## 2020-02-10 ENCOUNTER — ANESTHESIA EVENT (OUTPATIENT)
Dept: SURGERY | Facility: CLINIC | Age: 45
End: 2020-02-10
Payer: COMMERCIAL

## 2020-02-10 VITALS
TEMPERATURE: 98.5 F | DIASTOLIC BLOOD PRESSURE: 80 MMHG | OXYGEN SATURATION: 97 % | HEIGHT: 62 IN | SYSTOLIC BLOOD PRESSURE: 116 MMHG | WEIGHT: 252.2 LBS | BODY MASS INDEX: 46.41 KG/M2 | HEART RATE: 88 BPM | RESPIRATION RATE: 19 BRPM

## 2020-02-10 DIAGNOSIS — R06.02 SHORTNESS OF BREATH: ICD-10-CM

## 2020-02-10 DIAGNOSIS — B97.7 HIGH RISK HPV INFECTION: ICD-10-CM

## 2020-02-10 DIAGNOSIS — Z01.818 PRE-OP EXAMINATION: Primary | ICD-10-CM

## 2020-02-10 LAB
ALBUMIN UR-MCNC: NEGATIVE MG/DL
APPEARANCE UR: NORMAL
APTT PPP: 26 SEC (ref 22–37)
BILIRUB UR QL STRIP: NEGATIVE
COLOR UR AUTO: YELLOW
GLUCOSE UR STRIP-MCNC: NEGATIVE MG/DL
HGB UR QL STRIP: NEGATIVE
INR PPP: 0.98 (ref 0.86–1.14)
KETONES UR STRIP-MCNC: NEGATIVE MG/DL
LEUKOCYTE ESTERASE UR QL STRIP: NEGATIVE
NITRATE UR QL: NEGATIVE
NT-PROBNP SERPL-MCNC: 26 PG/ML (ref 0–125)
PH UR STRIP: 7 PH (ref 5–7)
SOURCE: NORMAL
SP GR UR STRIP: 1.02 (ref 1–1.03)
UROBILINOGEN UR STRIP-MCNC: 0 MG/DL (ref 0–2)

## 2020-02-10 ASSESSMENT — MIFFLIN-ST. JEOR: SCORE: 1752.22

## 2020-02-10 ASSESSMENT — ENCOUNTER SYMPTOMS
SEIZURES: 0
ORTHOPNEA: 0

## 2020-02-10 ASSESSMENT — PAIN SCALES - GENERAL: PAINLEVEL: SEVERE PAIN (7)

## 2020-02-10 ASSESSMENT — LIFESTYLE VARIABLES: TOBACCO_USE: 0

## 2020-02-10 NOTE — PATIENT INSTRUCTIONS
Preparing for Your Surgery      Name:  Doretha Fernandez   MRN:  [id number]   :  1975   Today's Date:  2/10/2020     Arriving for surgery:  Surgery date:  20  Arrival time:  08:15 am  Please come to:     [facility]  [address] [place] [id number]     Parking is available in front of the Clinics and Surgery Center building from 5:30AM to 8:00PM.  -  Proceed to the 5th floor to check into the Ambulatory Surgery Center.              >> There will be patient concierges on the 1st and 5th floor, for assistance or an escort, if you would like.              >> Please call [phone] with any questions.    What can I eat or drink?  -  You may have solid food or milk products until 8 hours prior to your surgery.  -  You may have water, apple juice or 7up/Sprite until 2 hours prior to your surgery.    Which medicines can I take?  Stop Aspirin, vitamins and supplements one week prior to surgery.  Hold Ibuprofen for 24 hours and/or Naproxen for 48 hours prior to surgery.   -  Do NOT take these medications in the morning, the day of surgery:  Metformin if normally taken in the morning.  -  Please take these medications the day of surgery:  Tylenol if needed; take all other scheduled medications normally taken in the morning.    How do I prepare myself?  -  Take two showers: one the night before surgery; and one the morning of surgery.         Use Scrubcare or Hibiclens to wash from neck down, leave soap on your skin for up to one minute.  Do not get soap in your eyes or ears.  You may use your own shampoo and conditioner; no other hair products.   -  Do NOT use lotion, powder, deodorant, or antiperspirant the day of your surgery.  -  Do NOT wear any makeup, fingernail polish or jewelry.  - Do not bring your own medications to the hospital, except for inhalers and eye   drops.  -  Bring your ID and insurance card.    -If you are scheduled to go home the Same Day as surgery  you must have a responsible adult as a  and to stay with you overnight the first 24 hours after surgery.     Questions or Concerns:  -If you are scheduled on the [facility] or [facility] campus and have questions or concerns regarding the day of surgery, please call Preadmission Nursing at [phone].     -If you are scheduled at the Ambulatory Surgery Center and have questions or concerns regarding the day of surgery please call [phone].    -If you have health changes between today and your surgery please call your surgeon. For questions after surgery please call your surgeons office.

## 2020-02-10 NOTE — H&P
Pre-Operative H & P     ADDENDUM: 2/11/20  The patient had echo done today. Results below. Normal EF without wall motion or valvular issues. The patient is optimized for the procedure.     Echo 2/10/20  Interpretation Summary  Global and regional left ventricular function is normal with an EF of 55-60%.  Global right ventricular function is normal.  No significant valvular dysfunction present.  The inferior vena cava was normal in size with preserved respiratory  variability.  No pericardial effusion is present.    CC:  Preoperative exam to assess for increased cardiopulmonary risk while undergoing surgery and anesthesia.    Date of Encounter: 2/10/2020  Primary Care Physician:  Carola Giron     Reason for visit: pre operative examination, High risk HPV infection    HPI  Doretha Fernandez is a 44 year old female who presents for pre-operative H & P in preparation for EXAM UNDER ANESTHESIA, PELVIS.  COLPOSCOPY WITH CERVICAL BIOPSIES with Dr. Jung on 2/19/20 at [facility] - but due to BMI she will need to be scheduled at the [place].     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  The patient presented to her family practice provider on 12/13/2019 for symptoms of yeast infection or an UTI.  She had a urinalysis which was negative for infection and a Pap was done.  The Pap came back as positive for HPV and she was referred to OB/GYN.  She was seen on 1/6/2020 but in the clinic a colposcopy could not be done given her body habitus.  She was then referred to Dr. Jung  and is now scheduled for the procedure as above.    History is obtained from the patient and chart review    Past Medical History  Past Medical History:   Diagnosis Date     Abnormal MRI     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      Anxiety      Basal cell carcinoma      Cervical high risk HPV (human papillomavirus) test positive 2019    See problem list     Colitis      Depression      Diabetes mellitus, iatrogenic (H) 2020     Inflammatory arthritis      Insomnia      Lumbago     left lower back pain     Lymphedema      Malignant melanoma (H)      Melanoma (H) 10/23/2017     Mild persistent asthma      Obesity      STORMY (obstructive sleep apnea)      Prothrombin deficiency (H)     takes 81mg asa daily     Stroke (cerebrum) (H)     During      TIA (transient ischemic attack)      Type 2 diabetes mellitus (H)        Past Surgical History  Past Surgical History:   Procedure Laterality Date     APPENDECTOMY       COLONOSCOPY N/A 10/18/2017    Procedure: COLONOSCOPY;  Colon;  Surgeon: Debbie Stephens MD;  [facility]: [facility]     COLONOSCOPY N/A 3/9/2018    Procedure: COMBINED COLONOSCOPY, SINGLE OR MULTIPLE BIOPSY/POLYPECTOMY BY BIOPSY;  colon;  Surgeon: Benita Schumacher MD;  Location: [facility] GI     COLONOSCOPY      multiple since  to present - about 6 total     DISSECT LYMPH NODE AXILLA Left 10/23/2017    Procedure: DISSECT LYMPH NODE AXILLA;  Left Axillary Lymph Node Dissection ;  Surgeon: Laurent Cool MD;  Location: [facility] OR     GYN SURGERY  ,          REPAIR MOHS Left 2017    Procedure: REPAIR MOHS;  Left Upper Lid Moh's Reconstruction;  Surgeon: Kisha Bosch MD;  Location: [facility] OR       Hx of Blood transfusions/reactions: denies     Hx of abnormal bleeding or anti-platelet use: none    Menstrual history: Patient's last menstrual period was 2020 (approximate).:     Steroid use in the last year: prednisone daily    Personal or FH with  "difficulty with Anesthesia:  denies    Prior to Admission Medications  Current Outpatient Medications   Medication Sig Dispense Refill     Acetaminophen (TYLENOL PO) Take 1,000 mg by mouth every 8 hours as needed for mild pain or fever (Pt last dose 02/10/20)        blood glucose (ACCU-CHEK GUIDE) test strip 1 strip by In Vitro route 2 times daily Use to test blood sugar 2 times daily or as directed. 200 strip 11     blood glucose monitoring (ACCU-CHEK FASTCLIX) lancets 1 each by In Vitro route 2 times daily Use to test blood sugar 2 times daily or as directed.  Ok to substitute alternative if insurance prefers. 102 each 11     Calcium Carb-Cholecalciferol 600-800 MG-UNIT TABS Take 800 mg by mouth daily before breakfast       Calcium Carbonate (CALCIUM-CARB 600 PO) Take 1 tablet by mouth as needed (Pt last took 1 week ago 2/10/20)        ferrous fumarate 65 mg, [place] FE,-Vitamin C 125 mg (VITRON C)  MG TABS tablet Take 1 tablet by mouth every morning       ibuprofen (ADVIL/MOTRIN) 100 MG tablet Take 300 mg by mouth as needed (Pt last took 1 week ago 2/10/20)       metFORMIN (GLUCOPHAGE-XR) 500 MG 24 hr tablet Take 2 tablets (1,000 mg) by mouth daily (with dinner) (Patient taking differently: Take 500 mg by mouth 2 times daily Transitioned to this schedule 1 week ago 2/10/20) 360 tablet 1     order for DME Equipment being ordered: X2 [employer] Compression shirt. 2 each 1     order for DME Equipment being ordered: 20-30 mmHg compression sleeve and 20-30 mmHg compression glove x 2 pair. 2 each 1     order for DME Equipment being ordered: x2 [employer] compression shirt 1 each 0     order for DME Equipment being ordered: 20-30mmHg compression sleeve and 20-30mmHg compression glove\" x2 pair 1 Units 0     predniSONE (DELTASONE) 5 MG tablet Take 1 tablet (5 mg) by mouth daily Prednisone taper:  30 mg p.o. daily x7 days.  27.5 mg p.o. daily x7 days.  25 mg p.o. daily x7 days.  22.5 mg p.o. daily x7 days. (Patient taking " differently: Take 20 mg by mouth daily Prednisone taper:  30 mg p.o. daily x7 days.  27.5 mg p.o. daily x7 days.  25 mg p.o. daily x7 days.  22.5 mg p.o. daily x7 days.) 147 tablet 0     topiramate (TOPAMAX) 50 MG tablet Take 50 mg by mouth every morning  1 tablet 0     venlafaxine (EFFEXOR-ER) 225 MG 24 hr tablet Take 1 tablet (225 mg) by mouth daily (Patient taking differently: Take 225 mg by mouth every morning ) 90 each 1     VITAMIN D3 25 MCG (1000 UT) tablet TAKE THREE TABLETS BY MOUTH EVERY DAY (Patient taking differently: Take by mouth every morning ) 270 tablet 0     albuterol (PROAIR HFA/PROVENTIL HFA/VENTOLIN HFA) 108 (90 Base) MCG/ACT inhaler Inhale 2 puffs into the lungs 4 times daily (Patient taking differently: Inhale 2 puffs into the lungs 4 times daily Pt has not needed to use 2/10/20) 1 Inhaler 3     fluocinonide (LIDEX) 0.05 % external cream Apply sparingly to affected area twice daily as needed.  Do not apply to face. 120 g 3     hydrOXYzine (ATARAX) 25 MG tablet Take 1 tablet (25 mg) by mouth At Bedtime (Patient taking differently: Take 25 mg by mouth At Bedtime Pt has not taken in past 2 weeks 2/10/20) 90 tablet 1     ondansetron (ZOFRAN) 8 MG tablet Take 8 mg by mouth every 8 hours as needed for nausea (Pt has not taken in past year 2/10/20)          Allergies  Allergies   Allergen Reactions     Bee Venom Swelling     Azithromycin Diarrhea     Erythromycin      Other reaction(s): GI intolerance, Vomiting     Fentanyl Other (See Comments)     sweating  sweating     Prochlorperazine Fatigue     Other reaction(s): Other (see comments)  Fatigue     Buspirone      Other reaction(s): GI intolerance  vomiting     Erythrocin Nausea and Vomiting     Zithromax [Azithromycin Dihydrate] Diarrhea     Enbrel [Etanercept] Hives and Rash       Social History  Social History     Socioeconomic History     Marital status:      Spouse name: Not on file     Number of children: Not on file     Years of  education: Not on file     Highest education level: Not on file   Occupational History     Not on file   Social Needs     Financial resource strain: Not on file     Food insecurity:     Worry: Not on file     Inability: Not on file     Transportation needs:     Medical: Not on file     Non-medical: Not on file   Tobacco Use     Smoking status: Former Smoker     Packs/day: 1.00     Years: 5.00     Pack years: 5.00     Last attempt to quit: 3/20/1998     Years since quittin.9     Smokeless tobacco: Never Used   Substance and Sexual Activity     Alcohol use: Yes     Comment: occ     Drug use: No     Sexual activity: Not Currently     Partners: Male     Birth control/protection: Surgical   Lifestyle     Physical activity:     Days per week: Not on file     Minutes per session: Not on file     Stress: Not on file   Relationships     Social connections:     Talks on phone: Not on file     Gets together: Not on file     Attends Christianity service: Not on file     Active member of club or organization: Not on file     Attends meetings of clubs or organizations: Not on file     Relationship status: Not on file     Intimate partner violence:     Fear of current or ex partner: Not on file     Emotionally abused: Not on file     Physically abused: Not on file     Forced sexual activity: Not on file   Other Topics Concern     Parent/sibling w/ CABG, MI or angioplasty before 65F 55M? No   Social History Narrative    20 y.o- patient's mother   of lung cancer. She had to take care of her younger sister.    2012- patient's  had a heart attack with stents placed, followed by cardiac rehabilitation    2000 TO 2012  was in [facility] for depression    2013 patient's  went through alcohol rehabilitation at [facility]            They have attended couple counseling a couple of times and patient went to the family program for chemical dependency.     Patient denies alcohol or drug use and herself            Has 2 children, girls ages 10 and 13     For a while she was working 3 jobs since her  was ill. Works at the [employer] for [employer]. Reports her job is very stressful.           Family History  Family History   Problem Relation Age of Onset     Cancer Mother 45        lung     Neurologic Disorder Mother         epilepsy     Lipids Father      Gastrointestinal Disease Father         diverticulitis      Depression Father      Cancer Maternal Grandmother      Blood Disease Maternal Grandmother         lymphoma      Arthritis Maternal Grandmother      Diabetes Maternal Grandmother      Depression Maternal Grandmother      Macular Degeneration Maternal Grandmother      Glaucoma Maternal Grandmother      Diabetes Maternal Grandfather      Cerebrovascular Disease Maternal Grandfather      Blood Disease Maternal Grandfather      Heart Disease Maternal Grandfather      Glaucoma Maternal Grandfather      Cancer Paternal Grandmother      Cancer - colorectal Paternal Grandmother      Respiratory Paternal Grandfather         emphysema      Heart Disease Daughter      Asthma Daughter      Depression Sister      Melanoma No family hx of        Review of Systems      ROS/MED HX    ENT/Pulmonary:     (+)sleep apnea, STORMY risk factors hypertension, obese, daytime somnolence, Intermittent asthma Treatment: Inhaler prn,  uses CPAP , . .   (-) tobacco use   Neurologic:     (+)migraines, CVA date: 2004 with deficits- left sided weakness , TIA    (-) seizures   Cardiovascular: Comment: lymphedema    (+) ----. : . . . :. . Previous cardiac testing date:results:date: results:ECG reviewed date:11/15/19 results:Sinus rhythm date: results:         (-) hypertension, ADKINS, orthopnea/PND, irregular heartbeat/palpitations and valvular problems/murmurs   METS/Exercise Tolerance:  3 - Able to walk 1-2 blocks without stopping   Hematologic: Comments: Prothrombin  "deficiency     (+) pt is not anticoagulated, -     (-) history of blood clots, anemia and History of Transfusion   Musculoskeletal:   (+)  other musculoskeletal- lumbago      GI/Hepatic: Comment: Autoimmune colitis    (+) GERD Asymptomatic on medication,       Renal/Genitourinary:  - ROS Renal section negative       Endo:     (+) type II DM Last HgA1c: 6.6 date: 1/27/20 Not using insulin - not using insulin pump Chronic steroid usage for Other Date most recently used: prednisone daily ,Obesity, .      Psychiatric:     (+) psychiatric history anxiety, depression and other (comment) (insomnia)      Infectious Disease:  - neg infectious disease ROS       Malignancy:   (+) Malignancy History of Skin and Other  Skin CA Remission status post Surgery, Other CA malignant melanoma in left upper extremity  Remission status post Surgery Inflammatory arthritis        Other:    (+) H/O Chronic Pain,no other significant disability        The complete review of systems is negative other than noted in the HPI or here.   Temp: 98.5  F (36.9  C) Temp src: Oral BP: 116/80 Pulse: 88   Resp: 19 SpO2: 97 %         252 lbs 3.2 oz  5' 2\"   Body mass index is 46.13 kg/m .       Physical Exam  Constitutional: Awake, alert, cooperative, no apparent distress, and appears stated age. Tearful during entire visit.   Eyes: Pupils equal, round and reactive to light, extra ocular muscles intact, sclera clear, conjunctiva normal.  HENT: Normocephalic, oral pharynx with moist mucus membranes, good dentition. No goiter appreciated. Thick neck. [place] hump.   Respiratory: Clear to auscultation bilaterally, no crackles or wheezing.  Cardiovascular: Regular rate and rhythm, normal S1 and S2, and no murmur noted.  Carotids +2, no bruits. No edema. Palpable pulses to radial  DP and PT arteries.   GI: Normal bowel sounds, soft, non-distended, obese, exam limited secondary to body habitus  Lymph/Hematologic: No cervical lymphadenopathy and no supraclavicular " lymphadenopathy.  Genitourinary:  defer  Skin: Warm and dry.  No rashes at anticipated surgical site.   Musculoskeletal: Limited ROM of neck. There is no redness, warmth, or swelling of the joints. Gross motor strength is normal.    Neurologic: Awake, alert, oriented to name, place and time. Cranial nerves II-XII are grossly intact. Gait is normal.   Neuropsychiatric: Calm, cooperative. Normal affect.     Labs: (personally reviewed)  Results for ELIZABETH MOREIRA (MRN [id number]) as of 2/10/2020 13:47   Ref. Range 2/10/2020 12:30 2/10/2020 12:33 2/10/2020 12:34   N-Terminal Pro Bnp Latest Ref Range: 0 - 125 pg/mL   26   INR Latest Ref Range: 0.86 - 1.14   0.98    PTT Latest Ref Range: 22 - 37 sec  26    Color Urine Unknown Yellow     Appearance Urine Unknown Slightly Cloudy     Glucose Urine Latest Ref Range: NEG^Negative mg/dL Negative     Bilirubin Urine Latest Ref Range: NEG^Negative  Negative     Ketones Urine Latest Ref Range: NEG^Negative mg/dL Negative     Specific Gravity Urine Latest Ref Range: 1.003 - 1.035  1.016     pH Urine Latest Ref Range: 5.0 - 7.0 pH 7.0     Protein Albumin Urine Latest Ref Range: NEG^Negative mg/dL Negative     Urobilinogen mg/dL Latest Ref Range: 0.0 - 2.0 mg/dL 0.0     Nitrite Urine Latest Ref Range: NEG^Negative  Negative     Blood Urine Latest Ref Range: NEG^Negative  Negative     Leukocyte Esterase Urine Latest Ref Range: NEG^Negative  Negative     Source Unknown Midstream Urine     Results for ELIZABETH MOREIRA (MRN [id number]) as of 2/10/2020 13:47   Ref. Range 1/27/2020 11:58   Sodium Latest Ref Range: 133 - 144 mmol/L 135   Potassium Latest Ref Range: 3.4 - 5.3 mmol/L 3.5   Chloride Latest Ref Range: 94 - 109 mmol/L 103   Carbon Dioxide Latest Ref Range: 20 - 32 mmol/L 21   Urea Nitrogen Latest Ref Range: 7 - 30 mg/dL 10   Creatinine Latest Ref Range: 0.52 - 1.04 mg/dL 0.78   GFR Estimate Latest Ref Range: >60 mL/min/1.73_m2 >90   GFR Estimate If Black Latest Ref Range: >60  mL/min/1.73_m2 >90   Calcium Latest Ref Range: 8.5 - 10.1 mg/dL 9.7   Anion Gap Latest Ref Range: 3 - 14 mmol/L 11   Albumin Latest Ref Range: 3.4 - 5.0 g/dL 3.6   Protein Total Latest Ref Range: 6.8 - 8.8 g/dL 7.5   Bilirubin Total Latest Ref Range: 0.2 - 1.3 mg/dL 0.3   Alkaline Phosphatase Latest Ref Range: 40 - 150 U/L 65   ALT Latest Ref Range: 0 - 50 U/L 34   AST Latest Ref Range: 0 - 45 U/L 15   Hemoglobin A1C Latest Ref Range: 0 - 5.6 % 6.6 (H)   TSH Latest Ref Range: 0.40 - 4.00 mU/L 1.90   Vitamin D Deficiency screening Latest Ref Range: 20 - 75 ug/L 44   Glucose Latest Ref Range: 70 - 99 mg/dL 156 (H)   WBC Latest Ref Range: 4.0 - 11.0 10e9/L 14.9 (H)   Hemoglobin Latest Ref Range: 11.7 - 15.7 g/dL 14.0   Hematocrit Latest Ref Range: 35.0 - 47.0 % 42.9   Platelet Count Latest Ref Range: 150 - 450 10e9/L 333   RBC Count Latest Ref Range: 3.8 - 5.2 10e12/L 4.80   MCV Latest Ref Range: 78 - 100 fl 89   MCH Latest Ref Range: 26.5 - 33.0 pg 29.2   MCHC Latest Ref Range: 31.5 - 36.5 g/dL 32.6   RDW Latest Ref Range: 10.0 - 15.0 % 13.9   Diff Method Unknown Manual Differential   % Neutrophils Latest Units: % 47.0   % Lymphocytes Latest Units: % 46.0   % Monocytes Latest Units: % 6.0   % Eosinophils Latest Units: % 1.0   % Basophils Latest Units: % 0.0   Absolute Neutrophil Latest Ref Range: 1.6 - 8.3 10e9/L 7.0   Absolute Lymphocytes Latest Ref Range: 0.8 - 5.3 10e9/L 6.9 (H)   Absolute Monocytes Latest Ref Range: 0.0 - 1.3 10e9/L 0.9   Absolute Eosinophils Latest Ref Range: 0.0 - 0.7 10e9/L 0.1   Absolute Basophils Latest Ref Range: 0.0 - 0.2 10e9/L 0.0   RBC Morphology Unknown Normal   Platelet Estimate Unknown Automated count confirmed.  Platelet morphology is normal.   % Retic Latest Ref Range: 0.5 - 2.0 % 1.7   Absolute Retic Latest Ref Range: 25 - 95 10e9/L 83.2       EK/15/19  Sinus rhythm    PET CT 19  FINDINGS:  No concerning FDG uptake to suggest recurrent or metastatic melanoma.  Resolution  of the previously seen asymmetric muscular activity involving the  right shoulder/arm and left lower leg, likely physiologic. Bilateral  peritrochanteric inflammation. Persistent focal moderate FDG uptake within the  distal esophagus which demonstrates a SUV max of 4.2 (2.9 on 07/11/2019 and 5.0  on 03/19/2019) suggestive of chronic inflammation. FDG uptake is otherwise  normal.    MRI brain 11/6/17  IMPRESSION:   1. No metastatic lesions are identified. No change.  2. No change in the cortical and subcortical signal abnormality in the  right parietal region. This finding is stable when compared to MR  scans going back to 2008. Etiology uncertain. It is possible it is due  to an old ischemic event. It could also be due to a neuronal migration  anomaly or a cortical dysplasia.  3. Again noted is an incidental developmental venous anomaly in the  right corona radiata.    The patient's records and results personally reviewed by this provider.     Outside records reviewed from: care everywhere    ASSESSMENT and PLAN  Doretha is a 44 year old woman who is scheduled for EXAM UNDER ANESTHESIA, PELVIS.  COLPOSCOPY WITH CERVICAL BIOPSIES on 2/19/20 by Dr. Jung in treatment of High risk HPV infection.  PAC referral for risk assessment and optimization for anesthesia with comorbid conditions of lymphedema, STORMY, asthma, prothrombin deficiency, history of CVA, obesity, type 2 diabetes, autoimmune colitis, inflammatory arthritis, history of BCC and malignant melanoma, lumbago, depression, anxiety, insomnia, chronic pain:    Pre-operative considerations:  1.  Cardiac:  Functional status- METS 3, the patient can walk 1 block.  Low risk surgery with 0.9% (RCRI #) risk of major adverse cardiac event.   ~ The patient reports baseline shortness of breath. She reports she thinks she can walk 1 block. She overall has a great amount of fatigue and isn't able to do very much. Given her RCRI risk factors with CVA and symptoms of shortness  of breath will get BNP and echo today.     2.  Pulm:  Airway feasible.  STORMY - patient has CPAP and is followed by sleep medicine. She reports she still has a lot of gasping for air. She should bring CPAP DOS.   ~ Asthma - the patient hasn't used her albuterol for a while.     3. Heme:  Patient reports she has prothrombin deficiency and has easy bruising - INR and PTT will be checked today. She has never had DVT/PE or transfusion.     4. Neuro: Patient reports she had a CVA/DAMION at the birth of her first child in 2004. She had left sided weakness but wasn't diagnosed until months later after seeing a neurologist. She has had several MRI of the brain which show a chronic cortical and subcortical signal abnormality in the right parietal region. Her last was in 2017. She is not on any medications for this.     5. Endo: Obesity - BMI 46 - the patient is not a candidate for the ASC given her BMI. This was communicated to her surgical team. Consideration for safe lifting techniques.   ~ Type 2 diabetes - recently diagnosed and started on metformin. She is also on topamax.   ~ Patient is on chronic prednisone. Will need stress dose steroids.     6. GI:  Risk of PONV score = 2.  If > 2, anti-emetic intervention recommended.  ~ History of autoimmune colitis after immunotherapy for malignant melanoma - resolved.   ~ GERD - patient reports symptoms periodically for which she takes TUMs. Nothing recently.     7. : HPV infection. The patient reports her symptoms first started as thinking she had a UTI or yeast infection. She reports that her urine smells - will check UA today.     8. Psych: anxiety, depression, insomnia - the patient followed with psychologist - she has another appointment tomorrow. She will continue effexor.     9. Musculoskeletal: lumbago - the patient will start PT tomorrow  ~ Chronic pain - patient is followed by pain clinic - continue tylenol. Hold ibuprofen for 24 hours prior to surgery.     10. Skin:  History of BCC of eyelid/ malignant melanoma - patient is s/p lymph node dissection on 10/23/17 by Dr. Cool. She has lymphedema secondary to dissection. She reports she is a hard stick and that due to the dissection her left arm isn't good IV access.     11. Immunology: The patient has inflammatory arthritis - this is secondary to her immunotherapy for malignant melanoma. She is followed by rheumatologist at [place] and last seen on 12/3/19. She is on chronic prednisone 20 mg daily. She has pain in her hands and feet. She reports if she gets below 10 mg/day she had horrible pains in her knees.     VTE risk: 0.5%-3%    Patient was discussed with Dr Velasquez.    The patient will need to get Echo before she is optimized for their procedure. AVS with information on surgery time/arrival time, meds and NPO status given by nursing staff.        Valery Camargo PA-C  Preoperative Assessment Center  [facility]  Phone: [phone]  Fax: [phone]

## 2020-02-11 ENCOUNTER — TELEPHONE (OUTPATIENT)
Dept: FAMILY MEDICINE | Facility: CLINIC | Age: 45
End: 2020-02-11

## 2020-02-11 ENCOUNTER — TELEPHONE (OUTPATIENT)
Dept: ONCOLOGY | Facility: CLINIC | Age: 45
End: 2020-02-11

## 2020-02-11 NOTE — TELEPHONE ENCOUNTER
Patient called asking to meet with Dr Naidu to complete [employer] paperwork as she has 24 hours to complete this and return it to her employer.     She states that she is trying to get into a pain clinic.     [facility]

## 2020-02-11 NOTE — TELEPHONE ENCOUNTER
Rescheduled surgery with Dr. Jung to 2/25 at [place]. Per PAC, pt cannot be done at [facility] due to BMI.    I left a detailed voicemail regarding the location change and also moved her post-op to 3/24 with Dr. Jung.    Provided my direct info.

## 2020-02-12 ENCOUNTER — OFFICE VISIT (OUTPATIENT)
Dept: FAMILY MEDICINE | Facility: CLINIC | Age: 45
End: 2020-02-12
Payer: COMMERCIAL

## 2020-02-12 VITALS
TEMPERATURE: 98.5 F | RESPIRATION RATE: 20 BRPM | HEIGHT: 62 IN | BODY MASS INDEX: 46.38 KG/M2 | HEART RATE: 105 BPM | OXYGEN SATURATION: 98 % | SYSTOLIC BLOOD PRESSURE: 116 MMHG | WEIGHT: 252 LBS | DIASTOLIC BLOOD PRESSURE: 78 MMHG

## 2020-02-12 DIAGNOSIS — G89.4 PAIN SYNDROME, CHRONIC: ICD-10-CM

## 2020-02-12 DIAGNOSIS — C43.62 MALIGNANT MELANOMA OF LEFT UPPER EXTREMITY INCLUDING SHOULDER (H): Primary | Chronic | ICD-10-CM

## 2020-02-12 DIAGNOSIS — E24.2 DRUG-INDUCED CUSHING'S SYNDROME (H): ICD-10-CM

## 2020-02-12 PROBLEM — R13.10 DYSPHAGIA: Status: RESOLVED | Noted: 2019-01-28 | Resolved: 2020-02-12

## 2020-02-12 PROBLEM — R19.7 DIARRHEA: Status: RESOLVED | Noted: 2018-03-04 | Resolved: 2020-02-12

## 2020-02-12 PROBLEM — M25.569 KNEE PAIN: Status: RESOLVED | Noted: 2018-05-12 | Resolved: 2020-02-12

## 2020-02-12 PROCEDURE — 99213 OFFICE O/P EST LOW 20 MIN: CPT | Performed by: FAMILY MEDICINE

## 2020-02-12 ASSESSMENT — ASTHMA QUESTIONNAIRES
QUESTION_4 LAST FOUR WEEKS HOW OFTEN HAVE YOU USED YOUR RESCUE INHALER OR NEBULIZER MEDICATION (SUCH AS ALBUTEROL): NOT AT ALL
QUESTION_3 LAST FOUR WEEKS HOW OFTEN DID YOUR ASTHMA SYMPTOMS (WHEEZING, COUGHING, SHORTNESS OF BREATH, CHEST TIGHTNESS OR PAIN) WAKE YOU UP AT NIGHT OR EARLIER THAN USUAL IN THE MORNING: ONCE A WEEK
QUESTION_1 LAST FOUR WEEKS HOW MUCH OF THE TIME DID YOUR ASTHMA KEEP YOU FROM GETTING AS MUCH DONE AT WORK, SCHOOL OR AT HOME: NONE OF THE TIME
QUESTION_5 LAST FOUR WEEKS HOW WOULD YOU RATE YOUR ASTHMA CONTROL: SOMEWHAT CONTROLLED
QUESTION_2 LAST FOUR WEEKS HOW OFTEN HAVE YOU HAD SHORTNESS OF BREATH: NOT AT ALL
ACT_TOTALSCORE: 21

## 2020-02-12 ASSESSMENT — PAIN SCALES - GENERAL: PAINLEVEL: SEVERE PAIN (6)

## 2020-02-12 ASSESSMENT — MIFFLIN-ST. JEOR: SCORE: 1751.31

## 2020-02-12 NOTE — PROGRESS NOTES
"Subjective     Doretha Fernandez is a 44 year old female who presents to clinic today for the following health issues:    HPI   Chief Complaint   Patient presents with     Forms     FMLA forms     Medication Reconciliation     stopped topamax     Here to have FMLA paperwork completed.  Issue started 10/2017 with a positive LYMPH NODE biopsy showing metastatic melanoma (unkonwn primary site) and then treatment for this caused colitis and ultimately polyarticular arthralgias and arthritis that has been responsive to oral steroids.  Unfortunately she's been unable to get off steroids and suffers from chronic pain. She'll be doing a three week intensive pain program starting on Monday 2/18. She has been off work since 1/14/2020.      Needs new FMLA completed.               Reviewed and updated as needed this visit by Provider         Review of Systems         Objective    /78   Pulse 105   Temp 98.5  F (36.9  C) (Tympanic)   Resp 20   Ht 1.575 m (5' 2\")   Wt 114.3 kg (252 lb)   LMP 02/03/2020 (Approximate)   SpO2 98%   Breastfeeding No   BMI 46.09 kg/m    Body mass index is 46.09 kg/m .  Physical Exam   No exam today            Assessment & Plan       ICD-10-CM    1. Malignant melanoma of left upper extremity including shoulder (H) C43.62    2. Pain syndrome, chronic G89.4    3. Drug-induced Cushing's syndrome (H) E24.2       FMLA paperwork completed, faxed and scanned into EMR        No follow-ups on file.    Anna Nadiu MD  [facility]      "

## 2020-02-12 NOTE — PATIENT INSTRUCTIONS
Our Clinic hours are:  Mondays    7:20 am - 7 pm  Tues -  Fri  7:20 am - 5 pm    Clinic Phone: [phone]    The clinic lab opens at 7:30 am Mon - Fri and appointments are required.    [facility]. [phone]  Monday  8 am - 7pm  Tues - Fri 8 am - 5:30 pm

## 2020-02-12 NOTE — TELEPHONE ENCOUNTER
Patient is calling back today and very frantic about her FMLA forms. I do not see anything in her chart about FMLA forms, but pain clinic forms are.Pateint states that she needs these filled out today, or she will loose her FMLA. Please advise. Does she need an appt?Elisa Mobley  [facility] [place]

## 2020-02-13 ASSESSMENT — ASTHMA QUESTIONNAIRES: ACT_TOTALSCORE: 21

## 2020-02-16 ENCOUNTER — HEALTH MAINTENANCE LETTER (OUTPATIENT)
Age: 45
End: 2020-02-16

## 2020-02-17 ENCOUNTER — TRANSFERRED RECORDS (OUTPATIENT)
Dept: HEALTH INFORMATION MANAGEMENT | Facility: CLINIC | Age: 45
End: 2020-02-17

## 2020-02-20 DIAGNOSIS — M19.90 INFLAMMATORY ARTHRITIS: ICD-10-CM

## 2020-02-20 DIAGNOSIS — E24.2 DRUG-INDUCED CUSHING'S SYNDROME (H): ICD-10-CM

## 2020-02-20 DIAGNOSIS — G89.4 PAIN SYNDROME, CHRONIC: Primary | ICD-10-CM

## 2020-02-20 NOTE — TELEPHONE ENCOUNTER
Requested Prescriptions   Pending Prescriptions Disp Refills     predniSONE 5 MG PO tablet 147 tablet 0     Sig: Take 1 tablet (5 mg) by mouth daily Prednisone taper:  30 mg p.o. daily x7 days.  27.5 mg p.o. daily x7 days.  25 mg p.o. daily x7 days.  22.5 mg p.o. daily x7 days.       There is no refill protocol information for this order              Last Written Prescription Date:  9/2/2019  Last Fill Quantity: 147,   # refills: 0  Last Office Visit: 2/12/2020 with Evangelista   Future Office visit:    Next 5 appointments (look out 90 days)    Feb 24, 2020  1:00 PM CST  Return Visit with Lowell Bullock MD  [facility] ([facility] [address] [place] [id number] [phone]           Routing refill request to provider for review/approval because:  Drug not on the G, P or [facility] refill protocol or controlled substance

## 2020-02-21 ENCOUNTER — TRANSFERRED RECORDS (OUTPATIENT)
Dept: HEALTH INFORMATION MANAGEMENT | Facility: CLINIC | Age: 45
End: 2020-02-21

## 2020-02-25 ENCOUNTER — ANESTHESIA (OUTPATIENT)
Dept: SURGERY | Facility: CLINIC | Age: 45
End: 2020-02-25
Payer: COMMERCIAL

## 2020-02-25 ENCOUNTER — SURGERY (OUTPATIENT)
Age: 45
End: 2020-02-25
Payer: COMMERCIAL

## 2020-02-25 ENCOUNTER — HOSPITAL ENCOUNTER (OUTPATIENT)
Facility: CLINIC | Age: 45
Discharge: HOME | End: 2020-02-25
Attending: OBSTETRICS & GYNECOLOGY | Admitting: OBSTETRICS & GYNECOLOGY
Payer: COMMERCIAL

## 2020-02-25 VITALS
HEIGHT: 63 IN | SYSTOLIC BLOOD PRESSURE: 115 MMHG | TEMPERATURE: 98 F | BODY MASS INDEX: 44.02 KG/M2 | DIASTOLIC BLOOD PRESSURE: 82 MMHG | OXYGEN SATURATION: 95 % | WEIGHT: 248.46 LBS | HEART RATE: 91 BPM | RESPIRATION RATE: 15 BRPM

## 2020-02-25 DIAGNOSIS — B97.7 HIGH RISK HPV INFECTION: ICD-10-CM

## 2020-02-25 DIAGNOSIS — D84.9 IMMUNOSUPPRESSION (H): ICD-10-CM

## 2020-02-25 LAB
GLUCOSE BLDC GLUCOMTR-MCNC: 101 MG/DL (ref 70–99)
GLUCOSE BLDC GLUCOMTR-MCNC: 122 MG/DL (ref 70–99)
HCG UR QL: NEGATIVE

## 2020-02-25 PROCEDURE — 25800030 ZZH RX IP 258 OP 636: Performed by: NURSE ANESTHETIST, CERTIFIED REGISTERED

## 2020-02-25 PROCEDURE — 25000566 ZZH SEVOFLURANE, EA 15 MIN: Performed by: OBSTETRICS & GYNECOLOGY

## 2020-02-25 PROCEDURE — 71000014 ZZH RECOVERY PHASE 1 LEVEL 2 FIRST HR: Performed by: OBSTETRICS & GYNECOLOGY

## 2020-02-25 PROCEDURE — 71000015 ZZH RECOVERY PHASE 1 LEVEL 2 EA ADDTL HR: Performed by: OBSTETRICS & GYNECOLOGY

## 2020-02-25 PROCEDURE — 57421 EXAM/BIOPSY OF VAG W/SCOPE: CPT | Mod: GC | Performed by: OBSTETRICS & GYNECOLOGY

## 2020-02-25 PROCEDURE — 25000125 ZZHC RX 250: Performed by: OBSTETRICS & GYNECOLOGY

## 2020-02-25 PROCEDURE — 25800030 ZZH RX IP 258 OP 636: Performed by: ANESTHESIOLOGY

## 2020-02-25 PROCEDURE — 40000170 ZZH STATISTIC PRE-PROCEDURE ASSESSMENT II: Performed by: OBSTETRICS & GYNECOLOGY

## 2020-02-25 PROCEDURE — 58120 DILATION AND CURETTAGE: CPT | Mod: GC | Performed by: OBSTETRICS & GYNECOLOGY

## 2020-02-25 PROCEDURE — 25000125 ZZHC RX 250: Performed by: NURSE ANESTHETIST, CERTIFIED REGISTERED

## 2020-02-25 PROCEDURE — 25000128 H RX IP 250 OP 636: Performed by: NURSE ANESTHETIST, CERTIFIED REGISTERED

## 2020-02-25 PROCEDURE — 81025 URINE PREGNANCY TEST: CPT | Performed by: ANESTHESIOLOGY

## 2020-02-25 PROCEDURE — 25000132 ZZH RX MED GY IP 250 OP 250 PS 637: Performed by: STUDENT IN AN ORGANIZED HEALTH CARE EDUCATION/TRAINING PROGRAM

## 2020-02-25 PROCEDURE — 71000027 ZZH RECOVERY PHASE 2 EACH 15 MINS: Performed by: OBSTETRICS & GYNECOLOGY

## 2020-02-25 PROCEDURE — 37000008 ZZH ANESTHESIA TECHNICAL FEE, 1ST 30 MIN: Performed by: OBSTETRICS & GYNECOLOGY

## 2020-02-25 PROCEDURE — 88305 TISSUE EXAM BY PATHOLOGIST: CPT | Performed by: OBSTETRICS & GYNECOLOGY

## 2020-02-25 PROCEDURE — 36000047 ZZH SURGERY LEVEL 1 EA 15 ADDTL MIN - UMMC: Performed by: OBSTETRICS & GYNECOLOGY

## 2020-02-25 PROCEDURE — 25000128 H RX IP 250 OP 636: Performed by: ANESTHESIOLOGY

## 2020-02-25 PROCEDURE — 36000045 ZZH SURGERY LEVEL 1 1ST 30 MIN - UMMC: Performed by: OBSTETRICS & GYNECOLOGY

## 2020-02-25 PROCEDURE — 37000009 ZZH ANESTHESIA TECHNICAL FEE, EACH ADDTL 15 MIN: Performed by: OBSTETRICS & GYNECOLOGY

## 2020-02-25 PROCEDURE — 82962 GLUCOSE BLOOD TEST: CPT

## 2020-02-25 PROCEDURE — 25000132 ZZH RX MED GY IP 250 OP 250 PS 637: Performed by: ANESTHESIOLOGY

## 2020-02-25 PROCEDURE — 27210794 ZZH OR GENERAL SUPPLY STERILE: Performed by: OBSTETRICS & GYNECOLOGY

## 2020-02-25 RX ORDER — ACETIC ACID 5 %
LIQUID (ML) MISCELLANEOUS PRN
Status: DISCONTINUED | OUTPATIENT
Start: 2020-02-25 | End: 2020-02-25 | Stop reason: HOSPADM

## 2020-02-25 RX ORDER — PREDNISONE 5 MG/1
5 TABLET ORAL DAILY
Qty: 147 TABLET | Refills: 0 | Status: CANCELLED | OUTPATIENT
Start: 2020-02-25

## 2020-02-25 RX ORDER — ONDANSETRON 4 MG/1
4 TABLET, ORALLY DISINTEGRATING ORAL EVERY 30 MIN PRN
Status: DISCONTINUED | OUTPATIENT
Start: 2020-02-25 | End: 2020-02-25 | Stop reason: HOSPADM

## 2020-02-25 RX ORDER — ALBUTEROL SULFATE 0.83 MG/ML
2.5 SOLUTION RESPIRATORY (INHALATION) EVERY 4 HOURS PRN
Status: DISCONTINUED | OUTPATIENT
Start: 2020-02-25 | End: 2020-02-25 | Stop reason: HOSPADM

## 2020-02-25 RX ORDER — PROPOFOL 10 MG/ML
INJECTION, EMULSION INTRAVENOUS PRN
Status: DISCONTINUED | OUTPATIENT
Start: 2020-02-25 | End: 2020-02-25

## 2020-02-25 RX ORDER — FENTANYL CITRATE 50 UG/ML
25-50 INJECTION, SOLUTION INTRAMUSCULAR; INTRAVENOUS EVERY 5 MIN PRN
Status: DISCONTINUED | OUTPATIENT
Start: 2020-02-25 | End: 2020-02-25 | Stop reason: HOSPADM

## 2020-02-25 RX ORDER — KETAMINE HYDROCHLORIDE 10 MG/ML
INJECTION, SOLUTION INTRAMUSCULAR; INTRAVENOUS PRN
Status: DISCONTINUED | OUTPATIENT
Start: 2020-02-25 | End: 2020-02-25

## 2020-02-25 RX ORDER — SODIUM CHLORIDE, SODIUM LACTATE, POTASSIUM CHLORIDE, CALCIUM CHLORIDE 600; 310; 30; 20 MG/100ML; MG/100ML; MG/100ML; MG/100ML
INJECTION, SOLUTION INTRAVENOUS CONTINUOUS
Status: DISCONTINUED | OUTPATIENT
Start: 2020-02-25 | End: 2020-02-25 | Stop reason: HOSPADM

## 2020-02-25 RX ORDER — METOCLOPRAMIDE HYDROCHLORIDE 5 MG/ML
10 INJECTION INTRAMUSCULAR; INTRAVENOUS ONCE
Status: COMPLETED | OUTPATIENT
Start: 2020-02-25 | End: 2020-02-25

## 2020-02-25 RX ORDER — ONDANSETRON 2 MG/ML
4 INJECTION INTRAMUSCULAR; INTRAVENOUS EVERY 30 MIN PRN
Status: DISCONTINUED | OUTPATIENT
Start: 2020-02-25 | End: 2020-02-25 | Stop reason: HOSPADM

## 2020-02-25 RX ORDER — ACETAMINOPHEN 325 MG/1
650 TABLET ORAL
Status: DISCONTINUED | OUTPATIENT
Start: 2020-02-25 | End: 2020-02-25 | Stop reason: HOSPADM

## 2020-02-25 RX ORDER — ONDANSETRON 2 MG/ML
INJECTION INTRAMUSCULAR; INTRAVENOUS PRN
Status: DISCONTINUED | OUTPATIENT
Start: 2020-02-25 | End: 2020-02-25

## 2020-02-25 RX ORDER — HYDROMORPHONE HYDROCHLORIDE 1 MG/ML
.3-.5 INJECTION, SOLUTION INTRAMUSCULAR; INTRAVENOUS; SUBCUTANEOUS EVERY 10 MIN PRN
Status: DISCONTINUED | OUTPATIENT
Start: 2020-02-25 | End: 2020-02-25 | Stop reason: HOSPADM

## 2020-02-25 RX ORDER — NALOXONE HYDROCHLORIDE 0.4 MG/ML
.1-.4 INJECTION, SOLUTION INTRAMUSCULAR; INTRAVENOUS; SUBCUTANEOUS
Status: DISCONTINUED | OUTPATIENT
Start: 2020-02-25 | End: 2020-02-25 | Stop reason: HOSPADM

## 2020-02-25 RX ORDER — GABAPENTIN 300 MG/1
300 CAPSULE ORAL 3 TIMES DAILY
COMMUNITY
End: 2020-06-04

## 2020-02-25 RX ORDER — LIDOCAINE 40 MG/G
CREAM TOPICAL
Status: DISCONTINUED | OUTPATIENT
Start: 2020-02-25 | End: 2020-02-25 | Stop reason: HOSPADM

## 2020-02-25 RX ORDER — ACETAMINOPHEN 325 MG/1
325 TABLET ORAL ONCE
Status: COMPLETED | OUTPATIENT
Start: 2020-02-25 | End: 2020-02-25

## 2020-02-25 RX ORDER — LIDOCAINE HYDROCHLORIDE 20 MG/ML
INJECTION, SOLUTION INFILTRATION; PERINEURAL PRN
Status: DISCONTINUED | OUTPATIENT
Start: 2020-02-25 | End: 2020-02-25

## 2020-02-25 RX ORDER — MEPERIDINE HYDROCHLORIDE 25 MG/ML
12.5 INJECTION INTRAMUSCULAR; INTRAVENOUS; SUBCUTANEOUS
Status: DISCONTINUED | OUTPATIENT
Start: 2020-02-25 | End: 2020-02-25 | Stop reason: HOSPADM

## 2020-02-25 RX ADMIN — HYDROMORPHONE HYDROCHLORIDE 0.2 MG: 1 INJECTION, SOLUTION INTRAMUSCULAR; INTRAVENOUS; SUBCUTANEOUS at 10:03

## 2020-02-25 RX ADMIN — ACETAMINOPHEN 325 MG: 325 TABLET, FILM COATED ORAL at 09:44

## 2020-02-25 RX ADMIN — DEXMEDETOMIDINE HYDROCHLORIDE 8 MCG: 100 INJECTION, SOLUTION INTRAVENOUS at 08:40

## 2020-02-25 RX ADMIN — ACETAMINOPHEN 650 MG: 325 TABLET, FILM COATED ORAL at 09:33

## 2020-02-25 RX ADMIN — PROPOFOL 200 MG: 10 INJECTION, EMULSION INTRAVENOUS at 08:16

## 2020-02-25 RX ADMIN — SUGAMMADEX 200 MG: 100 INJECTION, SOLUTION INTRAVENOUS at 08:55

## 2020-02-25 RX ADMIN — LIDOCAINE HYDROCHLORIDE 100 MG: 20 INJECTION, SOLUTION INFILTRATION; PERINEURAL at 08:16

## 2020-02-25 RX ADMIN — SODIUM CHLORIDE, POTASSIUM CHLORIDE, SODIUM LACTATE AND CALCIUM CHLORIDE: 600; 310; 30; 20 INJECTION, SOLUTION INTRAVENOUS at 07:58

## 2020-02-25 RX ADMIN — METOCLOPRAMIDE 10 MG: 5 INJECTION, SOLUTION INTRAMUSCULAR; INTRAVENOUS at 10:45

## 2020-02-25 RX ADMIN — ROCURONIUM BROMIDE 50 MG: 10 INJECTION INTRAVENOUS at 08:17

## 2020-02-25 RX ADMIN — MIDAZOLAM 2 MG: 1 INJECTION INTRAMUSCULAR; INTRAVENOUS at 07:58

## 2020-02-25 RX ADMIN — DEXMEDETOMIDINE HYDROCHLORIDE 8 MCG: 100 INJECTION, SOLUTION INTRAVENOUS at 08:23

## 2020-02-25 RX ADMIN — Medication 50 ML: at 08:40

## 2020-02-25 RX ADMIN — ONDANSETRON 4 MG: 2 INJECTION INTRAMUSCULAR; INTRAVENOUS at 08:27

## 2020-02-25 RX ADMIN — HYDROMORPHONE HYDROCHLORIDE 0.3 MG: 1 INJECTION, SOLUTION INTRAMUSCULAR; INTRAVENOUS; SUBCUTANEOUS at 09:30

## 2020-02-25 RX ADMIN — KETAMINE HYDROCHLORIDE 30 MG: 10 INJECTION, SOLUTION INTRAMUSCULAR; INTRAVENOUS at 08:15

## 2020-02-25 ASSESSMENT — MIFFLIN-ST. JEOR: SCORE: 1743.19

## 2020-02-25 ASSESSMENT — PAIN DESCRIPTION - DESCRIPTORS: DESCRIPTORS: SHARP

## 2020-02-25 NOTE — TELEPHONE ENCOUNTER
Prednisone refill.  Last writtten by .  Unable to reach pt, currently in surgery.  Last written 9/2/19.  Pt has had many times.  Waiting to call pt to clarify.  Advise.  Herminio

## 2020-02-25 NOTE — DISCHARGE INSTRUCTIONS
[facility]  Same-Day Surgery   Adult Discharge Orders & Instructions     For 24 hours after surgery    1. Get plenty of rest.  A responsible adult must stay with you for at least 24 hours after you leave the hospital.   2. Do not drive or use heavy equipment.  If you have weakness or tingling, don't drive or use heavy equipment until this feeling goes away.  3. Do not drink alcohol.  4. Avoid strenuous or risky activities.  Ask for help when climbing stairs.   5. You may feel lightheaded.  IF so, sit for a few minutes before standing.  Have someone help you get up.   6. If you have nausea (feel sick to your stomach): Drink only clear liquids such as apple juice, ginger ale, broth or 7-Up.  Rest may also help.  Be sure to drink enough fluids.  Move to a regular diet as you feel able.  7. You may have a slight fever. Call the doctor if your fever is over 100 F (37.7 C) (taken under the tongue) or lasts longer than 24 hours.  8. You may have a dry mouth, a sore throat, muscle aches or trouble sleeping.  These should go away after 24 hours.  9. Do not make important or legal decisions.   Call your doctor for any of the followin.  Signs of infection (fever, growing tenderness at the surgery site, a large amount of drainage or bleeding, severe pain, foul-smelling drainage, redness, swelling).    2. It has been over 8 to 10 hours since surgery and you are still not able to urinate (pass water).    3.  Headache for over 24 hours.    4.  Numbness, tingling or weakness the day after surgery (if you had spinal anesthesia).  To contact Dr Jung's office call [phone] or:       [phone] and ask for the resident on call for OB/GYN Oncology (answered 24 hours a day)      Emergency Department:    [facility]: [phone]       (TTY for hearing impaired: [phone])    [facility]: [phone]       (TTY for hearing impaired: [phone])

## 2020-02-25 NOTE — ANESTHESIA CARE TRANSFER NOTE
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    Anesthesia Type:   General     Note:  Airway :Nasal Cannula  Patient transferred to:PACU  Comments: Pt denies pain or nausea.  Report given to RN. Handoff Report: Identifed the Patient, Identified the Reponsible Provider, Reviewed the pertinent medical history, Discussed the surgical course, Reviewed Intra-OP anesthesia mangement and issues during anesthesia, Set expectations for post-procedure period and Allowed opportunity for questions and acknowledgement of understanding      Vitals: (Last set prior to Anesthesia Care Transfer)    CRNA VITALS  2/25/2020 0837 - 2/25/2020 0912      2/25/2020             Pulse:  93    SpO2:  97 %    Resp Rate (observed):  16                Electronically Signed By: AMADA Dunn CRNA  February 25, 2020  9:12 AM

## 2020-02-25 NOTE — PROGRESS NOTES
Gynecologic Oncology Postoperative Check Note  2/25/2020    S:   Patient reports she is doing well postoperatively. Pain is well controlled with PO medication. Ambulating without pain. Voiding spontaneously. Tolerating ice chips without nausea or vomiting. No bleeding. Denies chest pain, shortness of breath, dizziness, or other concerns at this time.    O:  Vitals:    02/25/20 1030 02/25/20 1045 02/25/20 1100 02/25/20 1106   BP: 114/77 103/70 99/50 105/75   BP Location:       Pulse: 89  91    Resp: 17 14 14 16   Temp:    98  F (36.7  C)   TempSrc:    Oral   SpO2: 96% 97%     Weight:       Height:           Gen: In no distress  Cardio: RRR, S1/S2, no murmurs  Resp: CTAB, no wheezing or crackles  Abdomen: soft, non-tender, non-distended  Extremities: Non-tender, 1+ edema    A/P:   44 year old POD#0 s/p EUA, colposcopy with cervical biopsies and vaginal biopsy; and ECC under GETA. Doing well and appropriate for discharge.    Dz: HPV 16+  FEN: Advance as tolerated  Pain: PO Tylenol PRN  : voiding spontaneously    Dispo: To home    Tarik Hunter MD MPH  OB/Gyn PGY-1  02/25/20 11:32 AM

## 2020-02-25 NOTE — OP NOTE
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 Same    Procedure:   - Exam Under Anesthesia  - Colposcopy with cervical and vaginal biopsy  - Endocervical curettage     Surgeon: Melina Jung MD  Assistants: Tarik Hunter MD MPH, PGY-1     Anesthesia: GETA  EBL: 10cc  Urine output: Not collected  IV fluids: 600cc crystalloid     Specimens:  - Cervical biopsies at 2, 5, 8, and 10 o'clock  - Vaginal biopsy at left cervicovaginal junction  - Endocervical curettage     Findings:  - Inflamed, indurated labia majora, consistent with yeast infection, otherwise normal external female genitalia  - Narrowed vaginal canal near vaginal apex  - Vagina and cervix without gross lesions  - Squamocolumnar junction not visualized  - Faint acetowhite change at 5 o'clock approximately 2mm from cervical os  - Acetowhite change at left vaginal apex, approximately 3mm from cervicovaginal junction     Complications: None apparent    Indications:   Doretha Fernandez is a 44 year old with past medical history of metastatic melanoma on chronic immunosuppression who was found to have high risk HPV (HPV 16) on a pap smear in Dec 2019. An office colposcopy was attempted but the cervix was not adequately visualized due to anatomic constraints and patient discomfort. An exam and colposcopy under anesthesia and was recommend and the patient desired to proceed. Discussed risks, benefits, and alternatives to the procedure including risk of infection, bleeding, recurrence. The patient's questions were answered, understanding confirmed, and the patient signed written informed consent.    Technique:   The patient was taken to the OR where she was placed in the  dorsal lithotomy position with feet in Ivan stirrups. General endotracheal anesthesia was administered. The patient was prepped and draped in the usual sterile fashion. A medium graves speculum was placed in the vagina and the cervix was visualized. None of the squamocolumnar junction was visualized. The cervix was flush against the vaginal apex, and was pulled forward with traction from a tenaculum. No gross lesions were visualized on the cervix. Acetic acid was applied and acetowhite changes were noted at 5 o'clock approximately 2mm from the cerivcal os, and on the vaginal apex approximately 3mm from the left cervicovaginal junction. An endocervical curettage was performed. Biopsies were taken at the 5 o'clock acetowhite change, and in each of the other 3 quadrants, at 2, 8, and 10 o'clock, at or near the cervical os. A vaginal biopsy was also taken at the area of acetowhite change. The colposcope was not used. The tenaculum was removed. Excellent hemostasis was achieved with cautery and pressure from a sponge stick. The speculum was removed from the vagina.    Dr. Jnug was present and scrubbed for the entire procedure. The patient tolerated the procedure well, was extubated in the OR, and transferred to the PACU in stable condition.    Tarik Hunter MD MPH  OB/Gyn PGY-1  02/25/20 9:20 AM    Attending Attestation:  I was present and scrubbed for the entire surgical procedure.  I have reviewed and edited the above note and agree with findings as documented.    Melina Jung MD  Gynecologic Oncology  [facility]

## 2020-02-25 NOTE — ANESTHESIA POSTPROCEDURE EVALUATION
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     Anesthesia Type: General          Postop Pain Control: Uneventful            Sign Out: Well controlled pain   PONV: No   Neuro/Psych: Uneventful            Sign Out: Acceptable/Baseline neuro status   Airway/Respiratory: Uneventful            Sign Out: Acceptable/Baseline resp. status   CV/Hemodynamics: Uneventful            Sign Out: Acceptable CV status   Other NRE: NONE   DID A NON-ROUTINE EVENT OCCUR? No         Last Anesthesia Record Vitals:  CRNA VITALS  2020 0837 - 2020 0937      2020             Pulse:  93    SpO2:  97 %    Resp Rate (observed):  16          Last PACU Vitals:  Vitals Value Taken Time   /77 2020 10:30 AM   Temp 36.7  C (98.1  F) 2020  9:15 AM   Pulse 89 2020 10:30 AM   Resp 17 2020 10:00 AM   SpO2 96 % 2020 10:34 AM   Temp src     NIBP     Pulse 93 2020  9:10 AM   SpO2 97 % 2020  9:10 AM   Resp     Temp     Ht Rate     Temp 2     Vitals shown include unvalidated device data.      Electronically Signed By: Kaushal Akins MD, 2020, 10:35 AM

## 2020-02-25 NOTE — TELEPHONE ENCOUNTER
Pt is on a Prednisone taper from , Rheumatology,[facility].  Pt was on Prednisone 30 mg but has tapered down and is currently on 20 mg.  Pt was to taper off as of Dec 2019 going down by 2.5mg every 2 weeks.  Pt requesting refill to get her down/off Prednisone.  Spoke with [facility], Dhara MARR # [phone], and pt is to have already tapered off Prednisone?  Pt has no f/u appt needed @ the [facility].  Pt is being seen @ [facility].  Pt has been off Humira for some time.    Prednisone:  17.5 mg x 1 week.  15 mg x 2 weeks.  12.5 mg x 2 weeks.  10.0 mg x 2 weeks.  7.5mg x 2 weeks.  5.0mg x 2 weeks.  2.5 mg x 2 weeks.    Order pended below.  Please review and advise.  KPavelRN

## 2020-02-26 DIAGNOSIS — E24.2 DRUG-INDUCED CUSHING'S SYNDROME (H): ICD-10-CM

## 2020-02-26 DIAGNOSIS — M19.90 INFLAMMATORY ARTHRITIS: ICD-10-CM

## 2020-02-26 DIAGNOSIS — G89.4 PAIN SYNDROME, CHRONIC: ICD-10-CM

## 2020-02-26 LAB — COPATH REPORT: NORMAL

## 2020-02-26 RX ORDER — PREDNISONE 5 MG/1
TABLET ORAL
Qty: 100 TABLET | Refills: 0 | OUTPATIENT
Start: 2020-02-26

## 2020-02-26 RX ORDER — PREDNISONE 5 MG/1
TABLET ORAL
Qty: 100 TABLET | Refills: 0 | Status: SHIPPED | OUTPATIENT
Start: 2020-02-26 | End: 2020-06-04

## 2020-02-26 NOTE — TELEPHONE ENCOUNTER
So, to be clear - confirm with rheumatology - they're refusing to refill the prednisone because she wasn't able to taper as they recommended and I am supposed to take over prescribing the medication that they started??    Anna Naidu M.D.

## 2020-02-27 ENCOUNTER — TRANSFERRED RECORDS (OUTPATIENT)
Dept: HEALTH INFORMATION MANAGEMENT | Facility: CLINIC | Age: 45
End: 2020-02-27

## 2020-03-02 ENCOUNTER — TELEPHONE (OUTPATIENT)
Dept: OPHTHALMOLOGY | Facility: CLINIC | Age: 45
End: 2020-03-02

## 2020-03-02 NOTE — TELEPHONE ENCOUNTER
Pt left message on triage line 3-2-2020  Requesting last glasses Rx information and left number [phone]    Last exam in 2017  No manifest refraction/glasses Rx completed    Called to review at 1235 and line busy times 3     Nicola Craven RN 12:39 PM 03/02/20

## 2020-03-03 ENCOUNTER — TRANSFERRED RECORDS (OUTPATIENT)
Dept: HEALTH INFORMATION MANAGEMENT | Facility: CLINIC | Age: 45
End: 2020-03-03

## 2020-03-06 ENCOUNTER — TELEPHONE (OUTPATIENT)
Dept: FAMILY MEDICINE | Facility: CLINIC | Age: 45
End: 2020-03-06

## 2020-03-06 ENCOUNTER — TELEPHONE (OUTPATIENT)
Dept: ONCOLOGY | Facility: CLINIC | Age: 45
End: 2020-03-06

## 2020-03-06 NOTE — TELEPHONE ENCOUNTER
Pt given [facility] # to reach Melina Jung MD care team to get Pathology results. KpavelRN

## 2020-03-06 NOTE — TELEPHONE ENCOUNTER
Pt requesting test results  Did not want to wait til 3/24 post op appt   Read through test results explained to pt that MD will further review pathology and follow up appt 3/24 appt  Pt states she cannot make this appt and wants to reschedule  Offered appt on 3/10 but pt is on vacation  Given next open schedule on 4/14  Pt states she will also call [facility] clinic to see if she can see MD there sooner

## 2020-03-06 NOTE — TELEPHONE ENCOUNTER
This result should come from the ordering doctor who did the procedure.      It appears to me that it was all benign (no cancer), but she should call the gyn/oncology department for their recommendations on what to do next or if any follow up is needed.    Anna Naidu M.D.

## 2020-03-06 NOTE — TELEPHONE ENCOUNTER
Reason for Call:  Pathology Results    Detailed comments: patient is calling and stating that she has not heard from anyone regarding her pathology results. I see Dr. Naidu has not read it yet. Can we get that done today. Patient is anxious for the results.    Phone Number Patient can be reached at: Home number on file [phone] (home)    Best Time: any    Can we leave a detailed message on this number? YES   Elisa Mobley  Clinic [facility]        Call taken on 3/6/2020 at 9:59 AM by Elisa Zapata

## 2020-03-06 NOTE — TELEPHONE ENCOUNTER
[facility] Call Center    Phone Message    May a detailed message be left on voicemail: yes     Reason for Call: Other: Doretha calling to request a call back from Dr. Jung's care team to receive her biopsy results. Doretha had her biopsy on 2/25/20, and would like to get a call so that she may also discuss them. Please give Doretha a call back at your earliest convenience.     Action Taken: Message routed to:  [facility] ([facility]  Onc    Travel Screening: Not Applicable

## 2020-03-18 DIAGNOSIS — G47.00 PERSISTENT INSOMNIA: Primary | ICD-10-CM

## 2020-03-18 DIAGNOSIS — F41.1 ANXIETY STATE: ICD-10-CM

## 2020-03-18 DIAGNOSIS — F32.0 MILD MAJOR DEPRESSION (H): ICD-10-CM

## 2020-03-18 RX ORDER — VENLAFAXINE HYDROCHLORIDE 225 MG/1
225 TABLET, EXTENDED RELEASE ORAL DAILY
Qty: 90 TABLET | Refills: 1 | Status: SHIPPED | OUTPATIENT
Start: 2020-03-18 | End: 2020-03-23

## 2020-03-18 RX ORDER — BACILLUS COAGULANS 1B CELL
1 CAPSULE ORAL DAILY
Qty: 30 TABLET | Refills: 3 | Status: SHIPPED | OUTPATIENT
Start: 2020-03-18 | End: 2021-04-15

## 2020-03-18 ASSESSMENT — PATIENT HEALTH QUESTIONNAIRE - PHQ9: SUM OF ALL RESPONSES TO PHQ QUESTIONS 1-9: 9

## 2020-03-18 NOTE — TELEPHONE ENCOUNTER
Pt was recommended to take Vitron(Vitamin C + Iron) in  Ferritin lab result note from 1/30/20.  Pt requesting an order to have covered by Ins.  Med pended.  Will route to .  Advise.  Hedy

## 2020-03-18 NOTE — TELEPHONE ENCOUNTER
"Requested Prescriptions   Pending Prescriptions Disp Refills     venlafaxine (EFFEXOR-ER) 225 MG 24 hr tablet [Pharmacy Med Name: VENLAFAXINE HCL ER 225MG TB24]  Last Written Prescription Date:  9/24/19  Last Fill Quantity: 90,  # refills: 1   Last Office Visit with [facility], [facility] or [facility] prescribing provider:  2/12/20   Future Office Visit:    Next 5 appointments (look out 90 days)    Apr 06, 2020 11:45 AM CDT  Return Visit with Lowell Bullock MD  [facility] [address] [address]  [phone]          90 tablet 1     Sig: TAKE ONE TABLET BY MOUTH ONCE DAILY       Serotonin-Norepinephrine Reuptake Inhibitors  Failed - 3/18/2020  5:02 AM        Failed - PHQ-9 score of less than 5 in past 6 months     Please review last PHQ-9 score.           Passed - Blood pressure under 140/90 in past 12 months     BP Readings from Last 3 Encounters:   02/25/20 115/82   02/12/20 116/78   02/10/20 116/80                 Passed - Medication is active on med list        Passed - Patient is age 18 or older        Passed - No active pregnancy on record        Passed - Normal serum creatinine on file in past 12 months     Recent Labs   Lab Test 01/27/20  1158   CR 0.78       Ok to refill medication if creatinine is low          Passed - No positive pregnancy test in past 12 months        Passed - Recent (6 mo) or future (30 days) visit within the authorizing provider's specialty     Patient had office visit in the last 6 months or has a visit in the next 30 days with authorizing provider or within the authorizing provider's specialty.  See \"Patient Info\" tab in inbasket, or \"Choose Columns\" in Meds & Orders section of the refill encounter.                 "

## 2020-03-19 DIAGNOSIS — E55.9 VITAMIN D DEFICIENCY: ICD-10-CM

## 2020-03-19 RX ORDER — CHOLECALCIFEROL (VITAMIN D3) 25 MCG
TABLET ORAL
Qty: 300 TABLET | Refills: 0 | Status: SHIPPED | OUTPATIENT
Start: 2020-03-19 | End: 2020-03-23

## 2020-03-19 NOTE — TELEPHONE ENCOUNTER
"Requested Prescriptions   Signed Prescriptions Disp Refills    VITAMIN D3 25 MCG (1000 UT) tablet 300 tablet 0     Sig: TAKE THREE TABLETS BY MOUTH EVERY DAY       Vitamin Supplements (Adult) Protocol Passed - 3/19/2020  2:17 PM        Passed - High dose Vitamin D not ordered        Passed - Recent (12 mo) or future (30 days) visit within the authorizing provider's specialty     Patient has had an office visit with the authorizing provider or a provider within the authorizing providers department within the previous 12 mos or has a future within next 30 days. See \"Patient Info\" tab in inbasket, or \"Choose Columns\" in Meds & Orders section of the refill encounter.              Passed - Medication is active on med list             Prescription approved per [facility] Refill Protocol.    "

## 2020-03-20 ENCOUNTER — TELEPHONE (OUTPATIENT)
Dept: FAMILY MEDICINE | Facility: CLINIC | Age: 45
End: 2020-03-20

## 2020-03-20 DIAGNOSIS — F32.0 MILD MAJOR DEPRESSION (H): ICD-10-CM

## 2020-03-20 DIAGNOSIS — F41.1 ANXIETY STATE: ICD-10-CM

## 2020-03-20 NOTE — TELEPHONE ENCOUNTER
Adriel Costello,     Patient would like a refill for this, it is a high priority, it was in [facility] but she wants it transferred here to [facility].     Thank you,    Michell Hui  Certified Pharmacy Technician, [facility]  Ph: [phone]  Fx: [phone]

## 2020-03-22 DIAGNOSIS — E55.9 VITAMIN D DEFICIENCY: ICD-10-CM

## 2020-03-23 ENCOUNTER — TELEPHONE (OUTPATIENT)
Dept: FAMILY MEDICINE | Facility: CLINIC | Age: 45
End: 2020-03-23

## 2020-03-23 DIAGNOSIS — G89.4 PAIN SYNDROME, CHRONIC: Primary | ICD-10-CM

## 2020-03-23 RX ORDER — CHOLECALCIFEROL (VITAMIN D3) 25 MCG
TABLET ORAL
Qty: 300 TABLET | Refills: 0 | Status: SHIPPED | OUTPATIENT
Start: 2020-03-23 | End: 2020-06-16

## 2020-03-23 RX ORDER — PREDNISONE 5 MG/1
5 TABLET ORAL DAILY
Qty: 147 TABLET | Refills: 0 | Status: CANCELLED | OUTPATIENT
Start: 2020-03-23

## 2020-03-23 RX ORDER — VENLAFAXINE HYDROCHLORIDE 225 MG/1
TABLET, EXTENDED RELEASE ORAL
Qty: 90 TABLET | Refills: 0 | Status: SHIPPED | OUTPATIENT
Start: 2020-03-23 | End: 2020-06-17

## 2020-03-23 RX ORDER — PREDNISONE 5 MG/1
TABLET ORAL
Qty: 93 TABLET | Refills: 0 | Status: SHIPPED | OUTPATIENT
Start: 2020-03-23 | End: 2020-06-04

## 2020-03-23 NOTE — TELEPHONE ENCOUNTER
See phone note from 2/20/2020 - what dose of prednisone is she currently taking?  Where is she in the taper of this medication?      Anna Naidu M.D.

## 2020-03-23 NOTE — TELEPHONE ENCOUNTER
"New prescription sent to pharmacy.  The \"nine days\" of 12.5 mg prednisone is to continue the taper - she should do a full two weeks of the 12.5 mg but apparently has enough to get through Friday.    Anna Naidu M.D.    "

## 2020-03-23 NOTE — TELEPHONE ENCOUNTER
"\"I started 12.5 mg today, so will be on that two weeks. I have enough now til Friday. \"   \"I thought I had enough for the full taper, but apparently they didn't have enough prescribed. \"    Dr Naidu, she will need the rest of 12.5 mg days (about9 more days) then the rest as below.      Per 2/20/20 note:   12.5 mg x 2 weeks.  10.0 mg x 2 weeks.  7.5mg x 2 weeks.  5.0mg x 2 weeks.  2.5 mg x 2 weeks.        Thanks,   Natalya Ferreira RNC    "

## 2020-03-25 ENCOUNTER — ALLIED HEALTH/NURSE VISIT (OUTPATIENT)
Dept: EDUCATION SERVICES | Facility: CLINIC | Age: 45
End: 2020-03-25
Payer: COMMERCIAL

## 2020-03-25 DIAGNOSIS — E11.9 DIABETES MELLITUS WITHOUT COMPLICATION (H): Primary | ICD-10-CM

## 2020-03-25 NOTE — PROGRESS NOTES
"Diabetes Self-Management Education & Support    Presents for: Initial Assessment for new diagnosis    SUBJECTIVE/OBJECTIVE:  Presents for: Initial Assessment for new diagnosis  Accompanied by: Self  Diabetes education in the past 24mo: No  Focus of Visit: Taking Medication, Healthy Eating, Diabetes Pathophysiology  Diabetes type: Type 2  Disease course: Improving  Transportation concerns: No  Other concerns:: None  Cultural Influences/Ethnic Background:  American      Diabetes Symptoms & Complications:  Fatigue: No  Neuropathy: Sometimes  Polydipsia: Yes(due to other medications)  Polyphagia: Sometimes  Polyuria: No  Visual change: Yes  Slow healing wounds: No  Symptom course: Improving  Weight trend: Stable       Patient Problem List and Family Medical History reviewed for relevant medical history, current medical status, and diabetes risk factors.    Vitals:  LMP 02/25/2020   Estimated body mass index is 44.72 kg/m  as calculated from the following:    Height as of 2/25/20: 1.588 m (5' 2.5\").    Weight as of 2/25/20: 112.7 kg (248 lb 7.3 oz).   Last 3 BP:   BP Readings from Last 3 Encounters:   02/25/20 115/82   02/12/20 116/78   02/10/20 116/80       History   Smoking Status     Former Smoker     Packs/day: 1.00     Years: 5.00     Quit date: 3/20/1998   Smokeless Tobacco     Never Used       Labs:  Lab Results   Component Value Date    A1C 6.6 01/27/2020     Lab Results   Component Value Date     01/27/2020     Lab Results   Component Value Date     07/28/2011     No results found for: HDL]  GFR Estimate   Date Value Ref Range Status   01/27/2020 >90 >60 mL/min/[1.73_m2] Final     Comment:     Non  GFR Calc  Starting 12/18/2018, serum creatinine based estimated GFR (eGFR) will be   calculated using the Chronic Kidney Disease Epidemiology Collaboration   (CKD-EPI) equation.       GFR Estimate If Black   Date Value Ref Range Status   01/27/2020 >90 >60 mL/min/[1.73_m2] Final     " Comment:      GFR Calc  Starting 12/18/2018, serum creatinine based estimated GFR (eGFR) will be   calculated using the Chronic Kidney Disease Epidemiology Collaboration   (CKD-EPI) equation.       Lab Results   Component Value Date    CR 0.78 01/27/2020     No results found for: MICROALBUMIN    Healthy Eating:  Healthy Eating Assessed Today: Yes  Cultural/Pentecostal diet restrictions?: No  Meal planning/habits: Avoiding sweets, Carb counting  How many times a week on average do you eat food made away from home (restaurant/take-out)?: 2  Breakfast: skips this meal, may have scones or muffins if eating breakfast,muffins and fruit or oatmeal and fruit  Lunch: chicken, or mexican foods,   Dinner: same as lunch, chicken and rice , hamburgers fries, pizza  Snacks: oatmeal and blueberries  Other: baked potatoes  Beverages: Water, Coffee, Tea  Has patient met with a dietitian in the past?: No    Being Active:  Being Active Assessed Today: Yes  Exercise:: Currently not exercising  Barrier to exercise: None    Monitoring:  Monitoring Assessed Today: Yes  Did patient bring glucose meter to appointment? : No  Blood Glucose Meter: Accu-chek(236 in clinic after snack)  Times checking blood sugar at home (number): 2  Times checking blood sugar at home (per): Day  Blood glucose trend: Decreasing    BG are running 89-95 in the mornings, the highest BG later in the day 165 after eating, today was 101 yesterday 93.      Taking Medications:  Diabetes Medication(s)     Biguanides       metFORMIN (GLUCOPHAGE-XR) 500 MG 24 hr tablet    Take 2 tablets (1,000 mg) by mouth daily (with dinner)     Patient taking differently:  Take 500 mg by mouth 2 times daily Transitioned to this schedule 1 week ago 2/10/20          Taking Medication Assessed Today: Yes  Current Treatments: Oral Medication (taken by mouth)  Problems taking diabetes medications regularly?: No  Diabetes medication side effects?: No    Problem Solving:  Problem  Solving Assessed Today: Yes  Is the patient at risk for hypoglycemia?: No  Does patient have severe weather/disaster plan for diabetes management?: Not Needed  Does patient have sick day plan for diabetes management?: Not Needed              Reducing Risks:  Reducing Risks Assessed Today: No  Has dilated eye exam at least once a year?: Yes  Sees dentist every 6 months?: Yes    Healthy Coping:  Healthy Coping Assessed Today: Yes  Emotional response to diabetes: Ready to learn, Acceptance  Informal Support system:: Family  Stage of change: ACTION (Actively working towards change)  Support resources: None  Patient Activation Measure Survey Score:  CHITRA Score (Last Two) 10/5/2011   CHITRA Raw Score 40   Activation Score 60   CHITRA Level 3       Diabetes knowledge and skills assessment:   Patient is knowledgeable in diabetes management concepts related to: Healthy Eating, Being Active, Monitoring and Taking Medication    Patient needs further education on the following diabetes management concepts: Healthy Eating and Monitoring    Based on learning assessment above, most appropriate setting for further diabetes education would be: Individual setting.      INTERVENTIONS:    Education provided today on:  AADE Self-Care Behaviors:  Healthy Eating: carbohydrate counting, consistency in amount, composition, and timing of food intake, weight reduction, heart healthy diet, eating out, portion control, plate planning method and label reading  Monitoring: purpose, proper technique, log and interpret results, individual blood glucose targets and frequency of monitoring  Taking Medication: action of prescribed medication, side effects of prescribed medications and when to take medications    Opportunities for ongoing education and support in diabetes-self management were discussed.    Pt verbalized understanding of concepts discussed and recommendations provided today.       Education Materials Provided:  [facility] with  Diabetes Book and Living Healthy with Diabetes      ASSESSMENT: Spoke with Doretha who is feeling so much better, more energy as well.,  Tolerating her Metformin.  She has been holding back on some sweets, we did discuss this how she can work in some treats as part of her carb choices, it is ok.  This was a concern.  She is on steroids due to her cancer and her BG could have been steroid induced, although we would treat the same, maybe in the future adding a GLP- 1 would be of benefit for sure.      Patient's most recent   Lab Results   Component Value Date    A1C 6.6 01/27/2020    is meeting goal of <7.0    PLAN  See Patient Instructions for co-developed, patient-stated behavior change goals.  AVS printed and provided to patient today. See Follow-Up section for recommended follow-up.    Racquel Rene RN/THEE [facility]cedo Diabetes Educator    Time Spent: 30 minutes  Encounter Type: Individual    Any diabetes medication dose changes were made via the CDE Protocol and Collaborative Practice Agreement with the patient's primary care provider. A copy of this encounter was shared with the provider.

## 2020-03-30 ENCOUNTER — TELEPHONE (OUTPATIENT)
Dept: FAMILY MEDICINE | Facility: CLINIC | Age: 45
End: 2020-03-30

## 2020-03-30 NOTE — TELEPHONE ENCOUNTER
Reason for Call:  Letter to extend FMLA    Detailed comments: patient is calling and stating that her FMLA is about to run out, and she does not want to return to work during this pandemic. She will need a letter faxed to [employer] at [phone], and if possible, please send one to her My Chart. Please advise.She states it is due to her underlying medical issues.    Phone Number Patient can be reached at: Home number on file [phone] (home)    Best Time: any    Can we leave a detailed message on this number? YES   Elisa Mobley  [facility] [place]       Call taken on 3/30/2020 at 4:36 PM by Elisa Zapata

## 2020-04-01 ENCOUNTER — VIRTUAL VISIT (OUTPATIENT)
Dept: FAMILY MEDICINE | Facility: CLINIC | Age: 45
End: 2020-04-01
Payer: COMMERCIAL

## 2020-04-01 DIAGNOSIS — C77.3 SECONDARY AND UNSPECIFIED MALIGNANT NEOPLASM OF AXILLA AND UPPER LIMB LYMPH NODES (H): ICD-10-CM

## 2020-04-01 DIAGNOSIS — D84.9 IMMUNOSUPPRESSED STATUS (H): Primary | ICD-10-CM

## 2020-04-01 PROCEDURE — 99213 OFFICE O/P EST LOW 20 MIN: CPT | Mod: TEL | Performed by: FAMILY MEDICINE

## 2020-04-01 NOTE — PROGRESS NOTES
"Subjective     Doretha Fernandez is a 45 year old female who is being evaluated via a billable telephone visit.      The patient has been notified of following:     \"This telephone visit will be conducted via a call between you and your physician/provider. We have found that certain health care needs can be provided without the need for a physical exam.  This service lets us provide the care you need with a short phone conversation.  If a prescription is necessary we can send it directly to your pharmacy.  If lab work is needed we can place an order for that and you can then stop by our lab to have the test done at a later time.    If during the course of the call the physician/provider feels a telephone visit is not appropriate, you will not be charged for this service.\"     Patient has given verbal consent for Telephone visit?  Yes    Doretha Fernandez complains of   Chief Complaint   Patient presents with     Forms     Patient would like to extend her FMLA due to her health issues and her not feeling well enough and the risk of getting sick.      Did a program through [employer] for intensive pain program.  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  When she was doing that it was helping.     Now that she's indoors and not able to do pool therapy as well as being on a taper of prednisone.     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  The \"doors are shut\" but they are planning on restarting work in the next week and a half.     She is very worried about her risk of infection during this time as she is still on prednisone.         ALLERGIES  Bee venom; Azithromycin; Erythromycin; Fentanyl; Prochlorperazine; Buspirone; Erythrocin; Zithromax [azithromycin dihydrate]; and Enbrel [etanercept]                   Reviewed and updated as needed this visit by Provider         Review of Systems          Objective   Reported vitals:  There were no vitals taken for this visit.     Psych: Alert and oriented times 3; coherent " speech, normal   rate and volume, able to articulate logical thoughts, able   to abstract reason, no tangential thoughts, no hallucinations   or delusions .  Becomes tearful when talking about returning to work.            Assessment/Plan:  1. Immunosuppressed status (H)   work letter done through June 1, 2020.    2. 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      Phone call duration:  12 minutes    Anna Naidu MD

## 2020-04-01 NOTE — LETTER
[facility]  [address]  [facility] [address]  [phone]        April 1, 2020    Regarding:  Doretha Fernandez  [address] [facility] [address]              To Whom It May Concern;      Doretha Fernandez is under my medical care.  She is currently immunocompromised due to long term medication use (prednisone).  She is tapering that and would be considered high risk due to this immunocompromised state during the COVID-19 pandemic.  I would recommend she not be required to be at work during this time as she is more susceptible to infection and higher risk if she were to become ill.  She is on the prednisone taper through June 1, 2020.    Please contact my office if you have questions.        Sincerely,        Anna Naidu MD

## 2020-04-01 NOTE — LETTER
[facility]  [address]  [facility] [address]  [phone]        April 1, 2020    Regarding:  Doretha Fernandez  [address] [facility] [address]              To Whom It May Concern;      Doretha Fernandez is under my medical care.  She is currently immunocompromised due to long term medication use (prednisone).  She is tapering that and would be considered high risk due to this immunocompromised state during the COVID-19 pandemic.  I would recommend she not be required to be at work during this time as she is more susceptible to infection and higher risk if she were to become ill.    Please contact my office if you have questions.        Sincerely,        Anna Naidu MD

## 2020-04-01 NOTE — PATIENT INSTRUCTIONS
Our Clinic hours are:  Mondays    7:20 am - 7 pm  Tues -  Fri  7:20 am - 5 pm    Clinic Phone: [phone]    The clinic lab opens at 7:30 am Mon - Fri and appointments are required.    [facility]. [phone]  Monday  8 am - 7pm  Tues - Fri 8 am - 5:30 pm

## 2020-04-02 ENCOUNTER — TELEPHONE (OUTPATIENT)
Dept: FAMILY MEDICINE | Facility: CLINIC | Age: 45
End: 2020-04-02

## 2020-04-02 NOTE — TELEPHONE ENCOUNTER
Reason for Call:  Form, our goal is to have forms completed with 72 hours, however, some forms may require a visit or additional information.    Type of letter, form or note:  disability - Form completed and faxed back to [employer] @ [phone] - Copy to scanning.      Who is the form from?: Insurance comp    Where did the form come from: form was faxed in    What clinic location was the form placed at?: [facility]    Where the form was placed: Given to physician    What number is listed as a contact on the form?:        Additional comments:     Call taken on 4/2/2020 at 9:11 AM by Jena Quintero

## 2020-04-06 ENCOUNTER — OFFICE VISIT (OUTPATIENT)
Dept: DERMATOLOGY | Facility: CLINIC | Age: 45
End: 2020-04-06
Payer: COMMERCIAL

## 2020-04-06 VITALS — SYSTOLIC BLOOD PRESSURE: 116 MMHG | OXYGEN SATURATION: 95 % | DIASTOLIC BLOOD PRESSURE: 73 MMHG | HEART RATE: 88 BPM

## 2020-04-06 DIAGNOSIS — Z85.828 HISTORY OF SKIN CANCER: ICD-10-CM

## 2020-04-06 DIAGNOSIS — D23.9 DERMAL NEVUS: ICD-10-CM

## 2020-04-06 DIAGNOSIS — L82.1 SEBORRHEIC KERATOSIS: ICD-10-CM

## 2020-04-06 DIAGNOSIS — D48.5 NEOPLASM OF UNCERTAIN BEHAVIOR OF SKIN: ICD-10-CM

## 2020-04-06 DIAGNOSIS — C43.9 METASTATIC MELANOMA (H): Primary | ICD-10-CM

## 2020-04-06 DIAGNOSIS — L81.4 LENTIGO: ICD-10-CM

## 2020-04-06 DIAGNOSIS — D18.01 ANGIOMA OF SKIN: ICD-10-CM

## 2020-04-06 PROCEDURE — 99214 OFFICE O/P EST MOD 30 MIN: CPT | Mod: 25 | Performed by: DERMATOLOGY

## 2020-04-06 PROCEDURE — 67810 INCAL BX EYELID SKN LID MRGN: CPT | Performed by: DERMATOLOGY

## 2020-04-06 PROCEDURE — 88305 TISSUE EXAM BY PATHOLOGIST: CPT | Mod: TC | Performed by: DERMATOLOGY

## 2020-04-06 NOTE — NURSING NOTE
"Chief Complaint   Patient presents with     Skin Check       Initial /73 (BP Location: Right arm, Patient Position: Chair, Cuff Size: Adult Large)   Pulse 88   SpO2 95%  Estimated body mass index is 44.72 kg/m  as calculated from the following:    Height as of 2/25/20: 1.588 m (5' 2.5\").    Weight as of 2/25/20: 112.7 kg (248 lb 7.3 oz).  Medications and allergies reviewed.    Patricia TORRES CMA (AAMA)    "

## 2020-04-06 NOTE — LETTER
2020         RE: Doretha Fernandez  [address]  [place] [address]        Dear Colleague,    Thank you for referring your patient, Doretha Fernandez, to the [facility]. Please see a copy of my visit note below.    Doretha Fernandez is a 45 year old year old female patient here today for f/u h xof melanoma unknown primary.  She went to Dr. Cool for node dissection.1 out of 20 nodes positive.  No adjuvant Radiation.  of brain and PET scan both clear.  Was on  Nivolumab got 8 injection but got colitis.  She went to [place].   She was treated with high dose steroids for colitis and had a dx of bacterial pneumonia.   She was admitted for to hospital for this. She is followed by GI She is off of melanoma meds.  She was also dx with seronegative rheumatoid arthritis and was on humira which is not helping,  She is going to pain clinic now.  Scans havebeen clear.  She notes spot on left upper lid.   Patient reports the following modifying factors none.  Associated symptoms: none.  Patient has no other skin complaints today.  Remainder of the HPI, Meds, PMH, Allergies, FH, and SH was reviewed in chart. Current PET-CT scan, physical exam and laboratory tests demonstrate no evidence of melanoma recurrence.      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      Anxiety      Basal cell carcinoma      Cervical high risk HPV (human papillomavirus) test positive 2019    See problem list     Colitis      Depression      Diabetes mellitus, iatrogenic (H) 2020     Inflammatory arthritis      Insomnia      Lumbago     left lower back pain     Lymphedema      Malignant melanoma (H)      Melanoma (H) 10/23/2017     Mild persistent asthma      Obesity      STORMY (obstructive sleep apnea)      Prothrombin deficiency (H)     takes 81mg asa daily     Stroke (cerebrum) (H)     During      TIA (transient ischemic attack)       Type 2 diabetes mellitus (H)        Past Surgical History:   Procedure Laterality Date     APPENDECTOMY       COLONOSCOPY N/A 10/18/2017    Procedure: COLONOSCOPY;  Colon;  Surgeon: Debbie Stephens MD;  [facility]     COLONOSCOPY N/A 3/9/2018    Procedure: COMBINED COLONOSCOPY, SINGLE OR MULTIPLE BIOPSY/POLYPECTOMY BY BIOPSY;  colon;  Surgeon: Benita Schumacher MD;  [facility]     COLONOSCOPY      multiple since  to present - about 6 total     DISSECT LYMPH NODE AXILLA Left 10/23/2017    Procedure: DISSECT LYMPH NODE AXILLA;  Left Axillary Lymph Node Dissection ;  Surgeon: Laurent Cool MD;  [facility]     EXAM UNDER ANESTHESIA PELVIC N/A 2020    Procedure: EXAM UNDER ANESTHESIA, PELVIS; with Cervical Biopsies, Vaginal Biopsy and Endocervical Curettings;  Surgeon: Melina Jung MD;  [facility]     GYN SURGERY  ,          REPAIR MOHS Left 2017    Procedure: REPAIR MOHS;  Left Upper Lid Moh's Reconstruction;  Surgeon: Kisha Bosch MD;  [facility]        Family History   Problem Relation Age of Onset     Cancer Mother 45        lung     Neurologic Disorder Mother         epilepsy     Lipids Father      Gastrointestinal Disease Father         diverticulitis      Depression Father      Cancer Maternal Grandmother      Blood Disease Maternal Grandmother         lymphoma      Arthritis Maternal Grandmother      Diabetes Maternal Grandmother      Depression Maternal Grandmother      Macular Degeneration Maternal Grandmother      Glaucoma Maternal Grandmother      Diabetes Maternal Grandfather      Cerebrovascular Disease Maternal Grandfather      Blood Disease Maternal Grandfather      Heart Disease Maternal Grandfather      Glaucoma Maternal Grandfather      Cancer Paternal Grandmother      Cancer - colorectal Paternal Grandmother      Respiratory Paternal Grandfather         emphysema      Heart Disease Daughter      Asthma Daughter      Depression Sister       Melanoma No family hx of        Social History     Socioeconomic History     Marital status:      Spouse name: Not on file     Number of children: Not on file     Years of education: Not on file     Highest education level: Not on file   Occupational History     Not on file   Social Needs     Financial resource strain: Not on file     Food insecurity     Worry: Not on file     Inability: Not on file     Transportation needs     Medical: Not on file     Non-medical: Not on file   Tobacco Use     Smoking status: Former Smoker     Packs/day: 1.00     Years: 5.00     Pack years: 5.00     Last attempt to quit: 3/20/1998     Years since quittin.0     Smokeless tobacco: Never Used   Substance and Sexual Activity     Alcohol use: Yes     Comment: occ     Drug use: No     Sexual activity: Not Currently     Partners: Male     Birth control/protection: Surgical   Lifestyle     Physical activity     Days per week: Not on file     Minutes per session: Not on file     Stress: Not on file   Relationships     Social connections     Talks on phone: Not on file     Gets together: Not on file     Attends Church service: Not on file     Active member of club or organization: Not on file     Attends meetings of clubs or organizations: Not on file     Relationship status: Not on file     Intimate partner violence     Fear of current or ex partner: Not on file     Emotionally abused: Not on file     Physically abused: Not on file     Forced sexual activity: Not on file   Other Topics Concern     Parent/sibling w/ CABG, MI or angioplasty before 65F 55M? No   Social History Narrative    20 y.o- patient's mother   of lung cancer. She had to take care of her younger sister.    2012- patient's  had a heart attack with stents placed, followed by cardiac rehabilitation    2000 TO 2012  was in [facility] depression    2013 patient's  went  through alcohol rehabilitation at [facility]            They have attended couple counseling a couple of times and patient went to the family program for chemical dependency.    Patient denies alcohol or drug use and herself            Has 2 children, girls ages 10 and 13     For a while she was working 3 jobs since her  was ill. Works at the [employer] for [employer]. Reports her job is very stressful.           Outpatient Encounter Medications as of 4/6/2020   Medication Sig Dispense Refill     Acetaminophen (TYLENOL PO) Take 1,000 mg by mouth every 8 hours as needed for mild pain or fever (Pt last dose 02/10/20)        albuterol (PROAIR HFA/PROVENTIL HFA/VENTOLIN HFA) 108 (90 Base) MCG/ACT inhaler Inhale 2 puffs into the lungs 4 times daily (Patient taking differently: Inhale 2 puffs into the lungs 4 times daily Pt has not needed to use 2/10/20) 1 Inhaler 3     blood glucose (ACCU-CHEK GUIDE) test strip 1 strip by In Vitro route 2 times daily Use to test blood sugar 2 times daily or as directed. 200 strip 11     blood glucose monitoring (ACCU-CHEK FASTCLIX) lancets 1 each by In Vitro route 2 times daily Use to test blood sugar 2 times daily or as directed.  Ok to substitute alternative if insurance prefers. 102 each 11     Calcium Carb-Cholecalciferol 600-800 MG-UNIT TABS Take 800 mg by mouth daily before breakfast       Calcium Carbonate (CALCIUM-CARB 600 PO) Take 1 tablet by mouth as needed (Pt last took 1 week ago 2/10/20)        ferrous fumarate 65 mg, [place]. FE,-Vitamin C 125 mg (VITRON-C)  MG TABS tablet Take 1 tablet by mouth daily 30 tablet 3     gabapentin (NEURONTIN) 300 MG capsule Take 300 mg by mouth 3 times daily       hydrOXYzine (ATARAX) 25 MG tablet Take 1 tablet (25 mg) by mouth At Bedtime (Patient taking differently: Take 25 mg by mouth At Bedtime Pt has not taken in past 2 weeks 2/10/20) 90 tablet 1     metFORMIN (GLUCOPHAGE-XR) 500 MG 24 hr tablet Take 2  "tablets (1,000 mg) by mouth daily (with dinner) (Patient taking differently: Take 500 mg by mouth 2 times daily Transitioned to this schedule 1 week ago 2/10/20) 360 tablet 1     ondansetron (ZOFRAN) 8 MG tablet Take 8 mg by mouth every 8 hours as needed for nausea (Pt has not taken in past year 2/10/20)        order for DME Equipment being ordered: X2 [employer]are[employer] Compression shirt. 2 each 1     order for DME Equipment being ordered: 20-30 mmHg compression sleeve and 20-30 mmHg compression glove x 2 pair. 2 each 1     order for DME Equipment being ordered: x2 [employer] compression shirt 1 each 0     order for DME Equipment being ordered: 20-30mmHg compression sleeve and 20-30mmHg compression glove\" x2 pair 1 Units 0     predniSONE (DELTASONE) 5 MG tablet Take 2.5 tablets (12.5 mg) by mouth daily for 9 days, THEN 2 tablets (10 mg) daily for 14 days, THEN 1.5 tablets (7.5 mg) daily for 14 days, THEN 1 tablet (5 mg) daily for 14 days, THEN 0.5 tablets (2.5 mg) daily for 14 days. 93 tablet 0     predniSONE (DELTASONE) 5 MG tablet Prednisone taper: Starting @ Prednisone 17.5 mg/day and decreasing by 2.5 mg every 2 weeks until off this med. 100 tablet 0     predniSONE (DELTASONE) 5 MG tablet Take 1 tablet (5 mg) by mouth daily Prednisone taper:  30 mg p.o. daily x7 days.  27.5 mg p.o. daily x7 days.  25 mg p.o. daily x7 days.  22.5 mg p.o. daily x7 days. (Patient taking differently: Take 20 mg by mouth daily Prednisone taper:  30 mg p.o. daily x7 days.  27.5 mg p.o. daily x7 days.  25 mg p.o. daily x7 days.  22.5 mg p.o. daily x7 days.) 147 tablet 0     venlafaxine (EFFEXOR-ER) 225 MG 24 hr tablet TAKE ONE TABLET BY MOUTH ONCE DAILY 90 tablet 0     VITAMIN D3 25 MCG (1000 UT) tablet TAKE THREE TABLETS BY MOUTH EVERY  tablet 0     Facility-Administered Encounter Medications as of 4/6/2020   Medication Dose Route Frequency Provider Last Rate Last Dose     lidocaine 1 % 9 mL  9 mL Intradermal Once Anna Naidu MD     "     sodium bicarbonate 8.4 % injection 1 mEq  1 mEq Intradermal Once Anna Naidu MD                 Review Of Systems  Skin: As above  Eyes: negative  Ears/Nose/Throat: negative  Respiratory: No shortness of breath, dyspnea on exertion, cough, or hemoptysis  Cardiovascular: negative  Gastrointestinal: negative  Genitourinary: negative  Musculoskeletal: negative  Neurologic: negative  Psychiatric: negative  Hematologic/Lymphatic/Immunologic: negative  Endocrine: negative      O:   NAD, WDWN, Alert & Oriented, Mood & Affect wnl, Vitals stable   Here today alone   /73 (BP Location: Right arm, Patient Position: Chair, Cuff Size: Adult Large)   Pulse 88   SpO2 95%    General appearance normal   Vitals stable   Alert, oriented and in no acute distress      Following lymph nodes palpated: Occipital, Cervical, Supraclavicular , axilla, inguinal, femoral no lad      Stuck on papules and brown macules on trunk and ext                                          Pink papules on trunk                           Red papules on trunk   L upepr lid shiny 2mm papule     The remainder of the full exam was normal; the following areas were examined:  conjunctiva/lids, oral mucosa, neck, peripheral vascular system, abdomen, lymph nodes, digits/nails, eccrine and apocrine glands, scalp/hair, face, neck, chest, abdomen, buttocks, back, RUE, LUE, RLE, LLE       Eyes: Conjunctivae/lids:Normal     ENT: Lips, buccal mucosa, tongue: normal    MSK:Normal    Cardiovascular: peripheral edema none    Pulm: Breathing Normal    Lymph Nodes: No Head and Neck Lymphadenopathy     Neuro/Psych: Orientation:Alert and Orientedx3 ; Mood/Affect:normal       A/P:  1. Metastatic melanoma, seborrheic keratosis, lentigo, dermal nevi, angioma, hx of basal cell carcinoma     2. L upepr lid r/o basal cell carcinoma   TANGENTIAL BIOPSY SENT OUT:  After consent, anesthesia with LEC and prep, tangential excision performed and specimen sent out for permanent  section histology.  No complications and routine wound care. Patient told to call our office in 1-2 weeks for result.              MELANOMA DISCUSSED WITH PATIENT:  I discussed the specifics of tumor, prognosis, metachronous melanoma, self exam, and genetics with the patient. I explained the need for monthly skin exams including and taught the patient how to do this. Patient was asked about new or changing moles and a full skin exam was performed.   BENIGN LESIONS DISCUSSED WITH PATIENT:  I discussed the specifics of tumor, prognosis, and genetics of benign lesions.  I explained that treatment of these lesions would be purely cosmetic and not medically neccessary.  I discussed with patient different removal options including excision, cautery and /or laser.        Nature and genetics of benign skin lesions dicussed with patient.  Signs and Symptoms of skin cancer discussed with patient.  ABCDEs of melanoma reviewed with patient.  Patient encouraged to perform monthly skin exams.  UV precautions reviewed with patient.  Skin care regimen reviewed with patient: Eliminate harsh soaps, i.e. Dial, zest, irsih spring; Mild soaps such as Cetaphil or Dove sensitive skin, avoid hot or cold showers, aggressive use of emollients including vanicream, cetaphil or cerave discussed with patient.    Risks of non-melanoma skin cancer discussed with patient   Return to clinic 3 months      Again, thank you for allowing me to participate in the care of your patient.        Sincerely,        Lowell Bullock MD

## 2020-04-08 ENCOUNTER — TELEPHONE (OUTPATIENT)
Dept: FAMILY MEDICINE | Facility: CLINIC | Age: 45
End: 2020-04-08

## 2020-04-08 LAB — COPATH REPORT: NORMAL

## 2020-04-08 NOTE — TELEPHONE ENCOUNTER
Reason for Call:  Form for FMLA    Detailed comments: Pt got un updated letter from Dr. Naidu last week for her ongoing FMLA and Dr. Naidu also completed pt's forms for long-term disability.  Pt needs another form completed and wants to know if she can drop off or email to us the form?  Please call patient and advise.      Phone Number Patient can be reached at: Home number on file [phone] (home)    Best Time: any    Can we leave a detailed message on this number? YES    Call taken on 4/8/2020 at 1:11 PM by Jena Quintero

## 2020-04-08 NOTE — TELEPHONE ENCOUNTER
MEENA to call clinic/RN concerning form drop off.   is currently @ the [facility] ( [facility]).  Pt is to schedule a CMA appt @ [facility] to drop off form for  to complete.  KPavelRN

## 2020-04-09 NOTE — TELEPHONE ENCOUNTER
Pt has a completed form in Media tab from the [employer] Dated 2/17/20.  The date on the bottom of page 2 is 1/14/2020-3/22/2020.   extended the end date to 6/1/2020 by writing a letter on 4/1/2020.  The Insurance [employer] Is not excepting the letter and wanting a new form completed with the new end date of 6/1/2020.  Pt has the blank form and will drop it off with Dr Reis,[facility] 4/10/20 @ the [facility].  KpavelRN

## 2020-04-10 ENCOUNTER — APPOINTMENT (OUTPATIENT)
Dept: FAMILY MEDICINE | Facility: CLINIC | Age: 45
End: 2020-04-10
Payer: COMMERCIAL

## 2020-04-14 ENCOUNTER — VIRTUAL VISIT (OUTPATIENT)
Dept: ONCOLOGY | Facility: CLINIC | Age: 45
End: 2020-04-14
Attending: OBSTETRICS & GYNECOLOGY
Payer: COMMERCIAL

## 2020-04-14 DIAGNOSIS — B97.7 HIGH RISK HPV INFECTION: Primary | ICD-10-CM

## 2020-04-14 PROCEDURE — 40000114 ZZH STATISTIC NO CHARGE CLINIC VISIT

## 2020-04-14 PROCEDURE — 99214 OFFICE O/P EST MOD 30 MIN: CPT | Mod: 95 | Performed by: OBSTETRICS & GYNECOLOGY

## 2020-04-14 NOTE — PROGRESS NOTES
"Doretha Fernandez is a 45 year old female who is being evaluated via a billable telephone visit.      The patient has been notified of following:     \"This telephone visit will be conducted via a call between you and your physician/provider. We have found that certain health care needs can be provided without the need for a physical exam.  This service lets us provide the care you need with a short phone conversation.  If a prescription is necessary we can send it directly to your pharmacy.  If lab work is needed we can place an order for that and you can then stop by our lab to have the test done at a later time.    Telephone visits are billed at different rates depending on your insurance coverage. During this emergency period, for some insurers they may be billed the same as an in-person visit.  Please reach out to your insurance provider with any questions.    If during the course of the call the physician/provider feels a telephone visit is not appropriate, you will not be charged for this service.\"      *Pt has no concerns or needs.**  Arleth Cooper CMA on 4/14/2020 at 9:07 AM  CB # for rooming team = [phone]      Patient has given verbal consent for Telephone visit?  Yes    How would you like to obtain your AVS? [facility]                             Provider phone visit    Date: 4/14/2020     Patient called today for follow-up.  Phone call due to COVID-19.    Patient is a 44yo with a history of melanoma, immunosuppression with autoimmune colitis and arthritis on predisone who had a NILM pap smear, HR HPV+.  She had a colpo by her PCP in early January but due to scarring of cervix and patient discomfort, evaluation could not be performed.    She underwent EUA, colpo with biopsies and ECC on 2/25/20.  Pathology showed negative ECC, biopsies with inflammation and reactive changes.     She has recovered without issues after the procedure, no further bleeding. Had spotting for about a week after the procedure.  " No pelvic pain, no changes in bowel or bladder function.  No currently working.     Past Medical History:    Past Medical History:   Diagnosis Date     Abnormal MRI     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      Anxiety      Basal cell carcinoma      Cervical high risk HPV (human papillomavirus) test positive 2019    See problem list     Colitis      Depression      Diabetes mellitus, iatrogenic (H) 2020     Inflammatory arthritis      Insomnia      Lumbago     left lower back pain     Lymphedema      Malignant melanoma (H)      Melanoma (H) 10/23/2017     Mild persistent asthma      Obesity      STORMY (obstructive sleep apnea)      Prothrombin deficiency (H)     takes 81mg asa daily     Stroke (cerebrum) (H)     During      TIA (transient ischemic attack)      Type 2 diabetes mellitus (H)          Past Surgical History:    Past Surgical History:   Procedure Laterality Date     APPENDECTOMY       COLONOSCOPY N/A 10/18/2017    Procedure: COLONOSCOPY;  Colon;  Surgeon: Debbie Stephens MD;  [facility]     COLONOSCOPY N/A 3/9/2018    Procedure: COMBINED COLONOSCOPY, SINGLE OR MULTIPLE BIOPSY/POLYPECTOMY BY BIOPSY;  colon;  Surgeon: Benita Schumacher MD;  [facility]     COLONOSCOPY      multiple since  to present - about 6 total     DISSECT LYMPH NODE AXILLA Left 10/23/2017    Procedure: DISSECT LYMPH NODE AXILLA;  Left Axillary Lymph Node Dissection ;  Surgeon: Laurent Cool MD;  [facility]     EXAM UNDER ANESTHESIA PELVIC N/A 2020    Procedure: EXAM UNDER ANESTHESIA, PELVIS; with Cervical Biopsies, Vaginal Biopsy and Endocervical Curettings;  Surgeon: Melina Jung MD;  [facility]     GYN SURGERY  ,          REPAIR MOHS Left 2017    Procedure: REPAIR MOHS;  Left Upper Lid Moh's Reconstruction;  Surgeon: Kisha Bosch MD;  Location:  OR         Health Maintenance:  Health Maintenance Due   Topic Date Due     LIPID   1976     MICROALBUMIN  1976     DIABETIC FOOT EXAM  1976     URINE DRUG SCREEN  1976     HIV SCREENING  1991     PNEUMOCOCCAL IMMUNIZATION 19-64 HIGHEST RISK (1 of 3 - PCV13) 1995     PREVENTIVE CARE VISIT  2016     MAMMO SCREENING  10/04/2018     EYE EXAM  10/12/2018     ASTHMA ACTION PLAN  2020     COLPOSCOPY  2020     A1C  2020         Current Medications:     has a current medication list which includes the following prescription(s): acetaminophen, albuterol, blood glucose, blood glucose monitoring, calcium carb-cholecalciferol, calcium carbonate, vitron-c, gabapentin, hydroxyzine, metformin, ondansetron, order for dme, order for dme, order for dme, order for dme, prednisone, prednisone, prednisone, venlafaxine, and vitamin d3, and the following Facility-Administered Medications: lidocaine and sodium bicarbonate.       Allergies:     [unfilled]        Social History:     Social History     Tobacco Use     Smoking status: Former Smoker     Packs/day: 1.00     Years: 5.00     Pack years: 5.00     Last attempt to quit: 3/20/1998     Years since quittin.0     Smokeless tobacco: Never Used   Substance Use Topics     Alcohol use: Yes     Comment: occ       History   Drug Use No           Family History:     The patient's family history is notable for :    Family History   Problem Relation Age of Onset     Cancer Mother 45        lung     Neurologic Disorder Mother         epilepsy     Lipids Father      Gastrointestinal Disease Father         diverticulitis      Depression Father      Cancer Maternal Grandmother      Blood Disease Maternal Grandmother         lymphoma      Arthritis Maternal Grandmother      Diabetes Maternal Grandmother      Depression Maternal Grandmother      Macular Degeneration Maternal Grandmother      Glaucoma Maternal Grandmother      Diabetes Maternal Grandfather      Cerebrovascular Disease Maternal Grandfather      Blood  Disease Maternal Grandfather      Heart Disease Maternal Grandfather      Glaucoma Maternal Grandfather      Cancer Paternal Grandmother      Cancer - colorectal Paternal Grandmother      Respiratory Paternal Grandfather         emphysema      Heart Disease Daughter      Asthma Daughter      Depression Sister      Melanoma No family hx of          Physical Exam:     There were no vitals taken for this visit.  There is no height or weight on file to calculate BMI.     Exam not done, phone visit    Assessment:    Doretha Fernandez is a 45 year old woman with history of HR HPV+ 16 in setting of immunosuppression    A total of 20 minutes was spent with the patient on phone, 20 minutes of which were spent in counseling the patient and/or treatment planning.      Plan:     1.)   History of HR HPV+ 16 in setting of immunosuppression:  Reviewed pathology in detail with patient.  Colposcopy with biopsies negative.  No further intervention at this time.  Needs repeat Pap smear and HPV testing in one year.  This can be done by her primary Ob/Gyn, Dr. Burr, in February 2021.    Discussed in detail with patient.  Questions answered, she expressed understanding of plan of care.    Melina Jung MD  Gynecologic Oncology  [facility]      Sincerely,      CC  Patient Care Team:  Anna Naidu MD as PCP - General (Family Practice)  Bairon Miranda MD as MD (Neurology)  Cheo Norton MD as MD (Ophthalmology)  Laura Garcia, RN as Nurse Coordinator (Surgery Surgical Oncology)  Kathy Richards MD as MD (Hematology & Oncology)  Antonia Nair MD as MD (Radiation Oncology)  Anna Naidu MD as Assigned PCP  Laura Rene, RN as Diabetes Educator (Diabetes Education)  MELINA JUNG

## 2020-05-20 ENCOUNTER — TELEPHONE (OUTPATIENT)
Dept: FAMILY MEDICINE | Facility: CLINIC | Age: 45
End: 2020-05-20

## 2020-05-20 DIAGNOSIS — G89.4 PAIN SYNDROME, CHRONIC: Primary | ICD-10-CM

## 2020-05-20 NOTE — TELEPHONE ENCOUNTER
Reason for Call:  Lab orders    Detailed comments: patient is calling and stating that she has an email from [facility] from her MD to have her primary MD order inflammitory marker labs for the patient. She has the lab appt, not the orders. Please advise.    Phone Number Patient can be reached at: Home number on file [phone] (home)    Best Time: any    Can we leave a detailed message on this number? YES   [facility]          Call taken on 5/20/2020 at 10:31 AM by Elisa Zapata

## 2020-05-20 NOTE — TELEPHONE ENCOUNTER
I can order ESR and CRP, she will need to get results to [facility] unless she wants to get us a fax number to send them to.  She may want to check with Dr. OG to find out if there is anything other than those labs that he would like to see.    Anna Naidu M.D.

## 2020-05-20 NOTE — TELEPHONE ENCOUNTER
Dr. Brady from the [facility] sent her an email.     Dr. OG asked if PCP could order inflammatory markers in the meantime (she is going to [place] for appointment in June).  Did not give patient orders.    She has a PET scan on 6/9  Joint scan on 6/10    She is down to 2.5mg prednisone right now and having a lot of pain.    She did schedule a lab appointment for tomorrow.      Rupal Fleming RN

## 2020-05-21 ENCOUNTER — TELEPHONE (OUTPATIENT)
Dept: FAMILY MEDICINE | Facility: CLINIC | Age: 45
End: 2020-05-21

## 2020-05-21 DIAGNOSIS — G89.4 PAIN SYNDROME, CHRONIC: ICD-10-CM

## 2020-05-21 LAB
CRP SERPL-MCNC: 21.6 MG/L (ref 0–8)
ERYTHROCYTE [SEDIMENTATION RATE] IN BLOOD BY WESTERGREN METHOD: 10 MM/H (ref 0–20)

## 2020-05-21 PROCEDURE — 36415 COLL VENOUS BLD VENIPUNCTURE: CPT | Performed by: FAMILY MEDICINE

## 2020-05-21 PROCEDURE — 85652 RBC SED RATE AUTOMATED: CPT | Performed by: FAMILY MEDICINE

## 2020-05-21 PROCEDURE — 86140 C-REACTIVE PROTEIN: CPT | Performed by: FAMILY MEDICINE

## 2020-05-21 RX ORDER — PREDNISONE 5 MG/1
5 TABLET ORAL DAILY
Qty: 147 TABLET | Refills: 0 | Status: CANCELLED | OUTPATIENT
Start: 2020-05-21

## 2020-05-21 NOTE — TELEPHONE ENCOUNTER
This is an iatrogenic diabetes caused by prolonged use of prednisone.  I'll recheck in a few months when she's off the steroid.     Thanks,  Anna Naidu M.D.

## 2020-05-21 NOTE — TELEPHONE ENCOUNTER
Last Written Prescription Date:  Prednisone  3/23/2020  Last Fill Quantity 93,  # refills: 0   Last office visit: 2/12/2020 with prescribing provider:Evangelista   Future Office Visit:          Elisa Mobley  [facility]

## 2020-05-24 ENCOUNTER — APPOINTMENT (OUTPATIENT)
Dept: GENERAL RADIOLOGY | Facility: CLINIC | Age: 45
End: 2020-05-24
Attending: PHYSICIAN ASSISTANT
Payer: COMMERCIAL

## 2020-05-24 ENCOUNTER — HOSPITAL ENCOUNTER (EMERGENCY)
Facility: CLINIC | Age: 45
Discharge: HOME | End: 2020-05-24
Attending: PHYSICIAN ASSISTANT | Admitting: PHYSICIAN ASSISTANT
Payer: COMMERCIAL

## 2020-05-24 VITALS
BODY MASS INDEX: 44.1 KG/M2 | WEIGHT: 245 LBS | TEMPERATURE: 97.3 F | OXYGEN SATURATION: 98 % | SYSTOLIC BLOOD PRESSURE: 125 MMHG | HEART RATE: 99 BPM | RESPIRATION RATE: 18 BRPM | DIASTOLIC BLOOD PRESSURE: 87 MMHG

## 2020-05-24 DIAGNOSIS — S99.912A ANKLE INJURY, LEFT, INITIAL ENCOUNTER: ICD-10-CM

## 2020-05-24 PROCEDURE — 73610 X-RAY EXAM OF ANKLE: CPT | Mod: LT

## 2020-05-24 PROCEDURE — 29515 APPLICATION SHORT LEG SPLINT: CPT | Mod: LT | Performed by: PHYSICIAN ASSISTANT

## 2020-05-24 PROCEDURE — 99284 EMERGENCY DEPT VISIT MOD MDM: CPT | Mod: 25 | Performed by: PHYSICIAN ASSISTANT

## 2020-05-24 PROCEDURE — 73590 X-RAY EXAM OF LOWER LEG: CPT | Mod: LT

## 2020-05-24 PROCEDURE — 73630 X-RAY EXAM OF FOOT: CPT | Mod: LT

## 2020-05-24 PROCEDURE — 99285 EMERGENCY DEPT VISIT HI MDM: CPT | Mod: 25 | Performed by: PHYSICIAN ASSISTANT

## 2020-05-24 RX ORDER — OXYCODONE HYDROCHLORIDE 5 MG/1
5 TABLET ORAL EVERY 6 HOURS PRN
Qty: 10 TABLET | Refills: 0 | Status: SHIPPED | OUTPATIENT
Start: 2020-05-24 | End: 2020-06-04

## 2020-05-24 ASSESSMENT — ENCOUNTER SYMPTOMS
WEAKNESS: 0
RESPIRATORY NEGATIVE: 1
GASTROINTESTINAL NEGATIVE: 1
NEUROLOGICAL NEGATIVE: 1
NUMBNESS: 0
FEVER: 0
FATIGUE: 0
APPETITE CHANGE: 0
ACTIVITY CHANGE: 0
HEMATURIA: 0

## 2020-05-24 NOTE — ED NOTES
Patient tripped on a wooden step yesterday. She has a swollen left ankle. She states it hurts more on the right side even though the left side has more swelling. She takes 100 mg of Tramadol daily along with Neurontin and Prednisone and states the pain is still there. Ice has been applied.

## 2020-05-24 NOTE — ED PROVIDER NOTES
History     Chief Complaint   Patient presents with     Ankle Pain     tripped on stair yesterday. ankle pain     HPI  Doretha Fernandez is a 45 year old female who sustained a left lower extremity injury 1 days ago.   Mechanism of injury: patient states she was walking down steps and rolled her ankle and fell onto her left leg. Patient denies loss of consciousness, head injury, abdominal pain, hip pain, chest pain or shortness of breath.    Immediate symptoms: immediate pain, delayed swelling, was able to bear weight directly after injury, but unable to today, no deformity was noted by the patient.   Symptoms have been sudden, worsening since that time.   Prior history of related problems: ankle sprain in the past.     Patient states she is in extreme pain normally due to being tapered off of prednisone that she has been on for the past 2 years. Patient states she has a hard time telling if her pain is anywhere other than her ankle, but that is where is it swollen.     Patient denies skin abrasions, numbness, change in skin coloration.     Patient took a tramadol this morning.       Problem list, Medication list, Allergies, and Medical/Social/Surgical histories reviewed in [facility] and updated as appropriate.      Allergies:  Allergies   Allergen Reactions     Bee Venom Swelling     Azithromycin Diarrhea     Erythromycin      Other reaction(s): GI intolerance, Vomiting     Fentanyl Other (See Comments)     sweating  sweating     Prochlorperazine Fatigue     Other reaction(s): Other (see comments)  Fatigue     Buspirone      Other reaction(s): GI intolerance  vomiting     Erythrocin Nausea and Vomiting     Zithromax [Azithromycin Dihydrate] Diarrhea     Enbrel [Etanercept] Hives and Rash       Problem List:    Patient Active Problem List    Diagnosis Date Noted     Pain syndrome, chronic 02/12/2020     Priority: Medium     Drug-induced Cushing's syndrome (H) 02/12/2020     Priority: Medium     Diabetes mellitus,  iatrogenic (H) 01/28/2020     Priority: Medium     High risk HPV infection 01/28/2020     Priority: Medium     Added automatically from request for surgery [id number]       Immunosuppression (H) 01/28/2020     Priority: Medium     Added automatically from request for surgery [id number]       STORMY (obstructive sleep apnea) 01/27/2020     Priority: Medium     1/2019 (241#)-AHI 24, lowest oxygen saturation was 86%, no periodic limb movement were noted, CPAP 8 cm/H20 was effective.       Secondary lymphedema 01/27/2020     Priority: Medium     Chronic neutrophilia 01/27/2020     Priority: Medium     Cervical high risk HPV (human papillomavirus) test positive 12/13/2019     Priority: Medium     2011 NIL pap.  2015 NIL pap, Neg HPV.  12/13/19 NIL pap , + HR HPV 16. Plan colp.   1/6/19 Failed colp exam secondary to anatomic constraints with gyn. Referred to gyn/onc.        Immunosuppressed status (H) 09/05/2019     Priority: Medium     Ulcerative colitis with complication, unspecified location (H) 09/05/2019     Priority: Medium     Morbid obesity (H) 09/05/2019     Priority: Medium     Arthritis, rheumatoid (H) 11/26/2018     Priority: Medium     Hypocalcemia 05/15/2018     Priority: Medium     Anemia 05/14/2018     Priority: Medium     Menstrual irregularity 05/14/2018     Priority: Medium     Abdominal pain 05/10/2018     Priority: Medium     Candidiasis of mouth 05/10/2018     Priority: Medium     Malaise 05/10/2018     Priority: Medium     Other chronic pain 05/06/2018     Priority: Medium     Rash and nonspecific skin eruption 04/05/2018     Priority: Medium     Bilateral leg cramps 03/07/2018     Priority: Medium     Intestinal giardiasis 03/05/2018     Priority: Medium     Rectal bleeding 03/04/2018     Priority: Medium     Colitis 03/01/2018     Priority: Medium     Functional diarrhea 02/22/2018     Priority: Medium     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/07/2017      Priority: Medium     Malignant melanoma of left upper extremity including shoulder (H) 10/12/2017     Priority: Medium     Metastatic malignant melanoma (H) 10/10/2017     Priority: Medium     Prothrombin mutation (H) 04/05/2017     Priority: Medium     On daily aspirin 81 mg per hematology's recommendations from 2008       Vision changes 04/01/2017     Priority: Medium     Mild intermittent asthma without complication 11/08/2013     Priority: Medium     Mild major depression (H) 06/24/2013     Priority: Medium     Anxiety state 09/13/2012     Priority: Medium     Problem list name updated by automated process. Provider to review       Esophageal reflux 09/13/2012     Priority: Medium     DUB (dysfunctional uterine bleeding) 07/28/2011     Priority: Medium     CARDIOVASCULAR SCREENING; LDL GOAL LESS THAN 160 07/28/2011     Priority: Low        Past Medical History:    Past Medical History:   Diagnosis Date     Abnormal MRI      Anxiety      Basal cell carcinoma      Cervical high risk HPV (human papillomavirus) test positive 12/13/2019     Colitis      Depression      Diabetes mellitus, iatrogenic (H) 1/28/2020     Inflammatory arthritis      Insomnia      Lumbago      Lymphedema      Malignant melanoma (H)      Melanoma (H) 10/23/2017     Mild persistent asthma      Obesity      STORMY (obstructive sleep apnea)      Prothrombin deficiency (H)      Stroke (cerebrum) (H) 2004     TIA (transient ischemic attack) 2004     Type 2 diabetes mellitus (H)        Past Surgical History:    Past Surgical History:   Procedure Laterality Date     APPENDECTOMY  2004     COLONOSCOPY N/A 10/18/2017    Procedure: COLONOSCOPY;  Colon;  Surgeon: Debbie Stephens MD;  Location: [facility] OR     COLONOSCOPY N/A 3/9/2018    Procedure: COMBINED COLONOSCOPY, SINGLE OR MULTIPLE BIOPSY/POLYPECTOMY BY BIOPSY;  colon;  Surgeon: Benita Schumacher MD;  Location: [facility] GI     COLONOSCOPY      multiple since 2018 to present - about 6 total     DISSECT LYMPH NODE  AXILLA Left 10/23/2017    Procedure: DISSECT LYMPH NODE AXILLA;  Left Axillary Lymph Node Dissection ;  Surgeon: Laurent Cool MD;  Location: [facility] OR     EXAM UNDER ANESTHESIA PELVIC N/A 2020    Procedure: EXAM UNDER ANESTHESIA, PELVIS; with Cervical Biopsies, Vaginal Biopsy and Endocervical Curettings;  Surgeon: Melina Jung MD;  Location: [facility] OR     GYN SURGERY  ,          REPAIR MOHS Left 2017    Procedure: REPAIR MOHS;  Left Upper Lid Moh's Reconstruction;  Surgeon: Kisha Bosch MD;  Location: [facility] OR       Family History:    Family History   Problem Relation Age of Onset     Cancer Mother 45        lung     Neurologic Disorder Mother         epilepsy     Lipids Father      Gastrointestinal Disease Father         diverticulitis      Depression Father      Cancer Maternal Grandmother      Blood Disease Maternal Grandmother         lymphoma      Arthritis Maternal Grandmother      Diabetes Maternal Grandmother      Depression Maternal Grandmother      Macular Degeneration Maternal Grandmother      Glaucoma Maternal Grandmother      Diabetes Maternal Grandfather      Cerebrovascular Disease Maternal Grandfather      Blood Disease Maternal Grandfather      Heart Disease Maternal Grandfather      Glaucoma Maternal Grandfather      Cancer Paternal Grandmother      Cancer - colorectal Paternal Grandmother      Respiratory Paternal Grandfather         emphysema      Heart Disease Daughter      Asthma Daughter      Depression Sister      Melanoma No family hx of        Social History:  Marital Status:   [4]  Social History     Tobacco Use     Smoking status: Former Smoker     Packs/day: 1.00     Years: 5.00     Pack years: 5.00     Last attempt to quit: 3/20/1998     Years since quittin.1     Smokeless tobacco: Never Used   Substance Use Topics     Alcohol use: Yes     Comment: occ     Drug use: No        Medications:    oxyCODONE (ROXICODONE) 5 MG tablet  Acetaminophen  (TYLENOL PO)  albuterol (PROAIR HFA/PROVENTIL HFA/VENTOLIN HFA) 108 (90 Base) MCG/ACT inhaler  blood glucose (ACCU-CHEK GUIDE) test strip  blood glucose monitoring (ACCU-CHEK FASTCLIX) lancets  Calcium Carb-Cholecalciferol 600-800 MG-UNIT TABS  Calcium Carbonate (CALCIUM-CARB 600 PO)  ferrous fumarate 65 mg, [place]. FE,-Vitamin C 125 mg (VITRON-C)  MG TABS tablet  gabapentin (NEURONTIN) 300 MG capsule  hydrOXYzine (ATARAX) 25 MG tablet  metFORMIN (GLUCOPHAGE-XR) 500 MG 24 hr tablet  ondansetron (ZOFRAN) 8 MG tablet  order for DME  order for DME  order for DME  order for DME  predniSONE (DELTASONE) 5 MG tablet  predniSONE (DELTASONE) 5 MG tablet  predniSONE (DELTASONE) 5 MG tablet  venlafaxine (EFFEXOR-ER) 225 MG 24 hr tablet  VITAMIN D3 25 MCG (1000 UT) tablet      Review of Systems   Constitutional: Negative for activity change, appetite change, fatigue and fever.   Respiratory: Negative.    Gastrointestinal: Negative.    Genitourinary: Negative for hematuria.   Musculoskeletal:        Left lower extremity.    Skin: Negative.    Neurological: Negative.  Negative for weakness and numbness.       Physical Exam   BP: 125/87  Pulse: 99  Temp: 97.3  F (36.3  C)  Resp: 18  Weight: 111.1 kg (245 lb)  SpO2: 98 %      Physical Exam  Vitals signs and nursing note reviewed.   Constitutional:       General: She is not in acute distress.     Appearance: Normal appearance. She is normal weight.   Cardiovascular:      Pulses: Normal pulses.   Musculoskeletal:      Left knee: She exhibits normal range of motion, no swelling, no effusion, no ecchymosis, no laceration and no erythema. Tenderness (over the distal knee) found.      Left ankle: She exhibits decreased range of motion, swelling and ecchymosis (minimal over lateral malleolus. ). She exhibits no deformity, no laceration and normal pulse. Tenderness. Lateral malleolus, medial malleolus, head of 5th metatarsal and proximal fibula tenderness found. Achilles tendon normal.       Left lower leg: She exhibits tenderness and bony tenderness. She exhibits no swelling and no deformity. No edema.      Left foot: Normal.   Skin:     General: Skin is warm.      Capillary Refill: Capillary refill takes less than 2 seconds.      Findings: Bruising present. No ecchymosis, erythema, laceration or wound.   Neurological:      General: No focal deficit present.      Mental Status: She is alert and oriented to person, place, and time.      GCS: GCS eye subscore is 4. GCS verbal subscore is 5. GCS motor subscore is 6.      Sensory: Sensation is intact.      Motor: Motor function is intact.      Gait: Gait abnormal (pain with ambulation. ).   Psychiatric:         Mood and Affect: Mood normal.         Behavior: Behavior normal.         Thought Content: Thought content normal.         Judgment: Judgment normal.         ED Course        Procedures              Critical Care time:  none               Results for orders placed or performed during the hospital encounter of 05/24/20 (from the past 24 hour(s))   XR Ankle Left G/E 3 Views    Narrative    EXAM: XR FOOT LT G/E 3 VW, XR ANKLE LT G/E 3 VW, XR TIBIA and FIBULA LT 2 VW  LOCATION: [facility]  DATE/TIME: 5/24/2020 2:48 PM    INDICATION: Injury to left lower leg yesterday and fall.  COMPARISON: None.      Impression    IMPRESSION:   1. Small ossicles adjacent to the inferior tip of the medial malleolus, most likely chronic. However, there is prominent soft tissue swelling about the ankle. Superimposed acute injury is suspected. No other evidence of acute ankle fracture. The ankle   mortise appears congruent.  2. The tibia/fibula appear within normal limits.  3. Suspected forefoot soft tissue swelling. No apparent fracture or dislocation.   Foot XR, G/E 3 views, left    Narrative    EXAM: XR FOOT LT G/E 3 VW, XR ANKLE LT G/E 3 VW, XR TIBIA and FIBULA LT 2 VW  LOCATION: [facility]  DATE/TIME: 5/24/2020 2:48 PM    INDICATION:  Injury to left lower leg yesterday and fall.  COMPARISON: None.      Impression    IMPRESSION:   1. Small ossicles adjacent to the inferior tip of the medial malleolus, most likely chronic. However, there is prominent soft tissue swelling about the ankle. Superimposed acute injury is suspected. No other evidence of acute ankle fracture. The ankle   mortise appears congruent.  2. The tibia/fibula appear within normal limits.  3. Suspected forefoot soft tissue swelling. No apparent fracture or dislocation.   XR Tibia & Fibula Left 2 Views    Narrative    EXAM: XR FOOT LT G/E 3 VW, XR ANKLE LT G/E 3 VW, XR TIBIA and FIBULA LT 2 VW  LOCATION: [facility]  DATE/TIME: 5/24/2020 2:48 PM    INDICATION: Injury to left lower leg yesterday and fall.  COMPARISON: None.      Impression    IMPRESSION:   1. Small ossicles adjacent to the inferior tip of the medial malleolus, most likely chronic. However, there is prominent soft tissue swelling about the ankle. Superimposed acute injury is suspected. No other evidence of acute ankle fracture. The ankle   mortise appears congruent.  2. The tibia/fibula appear within normal limits.  3. Suspected forefoot soft tissue swelling. No apparent fracture or dislocation.       Medications - No data to display    Assessments & Plan (with Medical Decision Making)     I have reviewed the nursing notes.    I have reviewed the findings, diagnosis, plan and need for follow up with the patient.    Doretha Fernandez is a 45 year old female who sustained a left lower extremity injury 1 days ago.   Mechanism of injury: patient states she was walking down steps and rolled her ankle and fell onto her left leg. Patient denies loss of consciousness, head injury, abdominal pain, hip pain, chest pain or shortness of breath.    Immediate symptoms: immediate pain, delayed swelling, was able to bear weight directly after injury, but unable to today, no deformity was noted by the patient.   Symptoms  have been sudden, worsening since that time.   Prior history of related problems: ankle sprain in the past.     Patient states she is in extreme pain normally due to being tapered off of prednisone that she has been on for the past 2 years. Patient states she has a hard time telling if her pain is anywhere other than her ankle, but that is where is it swollen.     Patient denies skin abrasions, numbness, change in skin coloration.     Patient took a tramadol this morning.     Exam findings above.  X-rays obtained in the emergency department of left tib-fib, ankle and foot which show small ossicles adjacent to the inferior tip of the medial malleolus, most likely chronic.  However there is prominent soft tissue swelling about the ankle.  Superimposed acute injury is suspected.  No other evidence of acute ankle fracture.  The ankle mortise appears congruent.  The tibia fibula appears within normal limits.  Suspected for foot soft tissue swelling.  No apparent fracture or dislocation.    Patient offered cam walker boot and crutches in the emergency department which she accepted and patient was fitted with these prior to discharge.  Patient given orthopedic information and informed to follow-up with them if symptoms persist or fail to improve in the next few days.  Patient ice, rest, elevate, Tylenol and ibuprofen over-the-counter as needed for pain.  Patient given prescription pain medication for breakthrough pain and informed of side effects and risks of taking this medication.  Patient to return to the emergency department sooner if symptoms worsen or change these were discussed with patient and given on discharge paperwork.  Patient discharged in stable condition with no concerns for compartment syndrome at this time.    Discharge Medication List as of 5/24/2020  4:09 PM      START taking these medications    Details   oxyCODONE (ROXICODONE) 5 MG tablet Take 1 tablet (5 mg) by mouth every 6 hours as needed for pain,  Disp-10 tablet,R-0, E-Prescribe             Final diagnoses:   Ankle injury, left, initial encounter       5/24/2020   [facility] EMERGENCY DEPARTMENT     Manjula Dowd, GURWINDER  05/24/20 1914

## 2020-05-24 NOTE — ED AVS SNAPSHOT
[facility] Emergency Department  [address] [address]  Phone:  [phone]  Fax:  [phone]                                    Doretha Fernandez   MRN: [id number]    Department:  [facility] Emergency Department   Date of Visit:  5/24/2020           After Visit Summary Signature Page    I have received my discharge instructions, and my questions have been answered. I have discussed any challenges I see with this plan with the nurse or doctor.    ..........................................................................................................................................  Patient/Patient Representative Signature      ..........................................................................................................................................  Patient Representative Print Name and Relationship to Patient    ..................................................               ................................................  Date                                   Time    ..........................................................................................................................................  Reviewed by Signature/Title    ...................................................              ..............................................  Date                                               Time          22EPIC Rev 08/18

## 2020-05-26 ENCOUNTER — HOSPITAL ENCOUNTER (EMERGENCY)
Facility: CLINIC | Age: 45
Discharge: HOME | End: 2020-05-26
Attending: FAMILY MEDICINE | Admitting: FAMILY MEDICINE
Payer: COMMERCIAL

## 2020-05-26 ENCOUNTER — TELEPHONE (OUTPATIENT)
Dept: FAMILY MEDICINE | Facility: CLINIC | Age: 45
End: 2020-05-26

## 2020-05-26 VITALS
WEIGHT: 245 LBS | HEART RATE: 104 BPM | SYSTOLIC BLOOD PRESSURE: 141 MMHG | BODY MASS INDEX: 43.41 KG/M2 | DIASTOLIC BLOOD PRESSURE: 91 MMHG | RESPIRATION RATE: 22 BRPM | TEMPERATURE: 98.3 F | HEIGHT: 63 IN | OXYGEN SATURATION: 97 %

## 2020-05-26 DIAGNOSIS — E24.2 DRUG-INDUCED CUSHING'S SYNDROME (H): ICD-10-CM

## 2020-05-26 DIAGNOSIS — G89.29 OTHER CHRONIC PAIN: ICD-10-CM

## 2020-05-26 DIAGNOSIS — M06.9 RHEUMATOID ARTHRITIS, INVOLVING UNSPECIFIED SITE, UNSPECIFIED RHEUMATOID FACTOR PRESENCE: ICD-10-CM

## 2020-05-26 DIAGNOSIS — G89.4 PAIN SYNDROME, CHRONIC: ICD-10-CM

## 2020-05-26 DIAGNOSIS — K52.9 COLITIS: ICD-10-CM

## 2020-05-26 PROCEDURE — 99284 EMERGENCY DEPT VISIT MOD MDM: CPT | Mod: Z6 | Performed by: FAMILY MEDICINE

## 2020-05-26 PROCEDURE — 25000132 ZZH RX MED GY IP 250 OP 250 PS 637: Performed by: FAMILY MEDICINE

## 2020-05-26 PROCEDURE — 25000131 ZZH RX MED GY IP 250 OP 636 PS 637: Performed by: FAMILY MEDICINE

## 2020-05-26 PROCEDURE — 99283 EMERGENCY DEPT VISIT LOW MDM: CPT | Performed by: FAMILY MEDICINE

## 2020-05-26 RX ORDER — PREDNISONE 2.5 MG/1
2.5 TABLET ORAL ONCE
Status: COMPLETED | OUTPATIENT
Start: 2020-05-26 | End: 2020-05-26

## 2020-05-26 RX ORDER — PREDNISONE 5 MG/1
TABLET ORAL
Qty: 93 TABLET | Refills: 0 | Status: CANCELLED | OUTPATIENT
Start: 2020-05-26 | End: 2020-07-30

## 2020-05-26 RX ORDER — OXYCODONE HYDROCHLORIDE 5 MG/1
5 TABLET ORAL ONCE
Status: COMPLETED | OUTPATIENT
Start: 2020-05-26 | End: 2020-05-26

## 2020-05-26 RX ADMIN — OXYCODONE HYDROCHLORIDE 5 MG: 5 TABLET ORAL at 16:55

## 2020-05-26 RX ADMIN — PREDNISONE 2.5 MG: 2.5 TABLET ORAL at 16:55

## 2020-05-26 ASSESSMENT — ENCOUNTER SYMPTOMS
SINUS PRESSURE: 0
NAUSEA: 0
CONSTIPATION: 0
SHORTNESS OF BREATH: 0
FEVER: 0
PALPITATIONS: 0
VOMITING: 0
DIARRHEA: 0
BLOOD IN STOOL: 0
DIAPHORESIS: 0
HEADACHES: 0
CHILLS: 0
COUGH: 0
ABDOMINAL PAIN: 0
SORE THROAT: 0
DYSURIA: 0
WHEEZING: 0
FREQUENCY: 0

## 2020-05-26 ASSESSMENT — MIFFLIN-ST. JEOR: SCORE: 1730.44

## 2020-05-26 NOTE — DISCHARGE INSTRUCTIONS
ICD-10-CM    1. Drug-induced Cushing's syndrome (H)  E24.2    2. Rheumatoid arthritis, involving unspecified site, unspecified rheumatoid factor presence (H)  M06.9    3. Colitis  K52.9    4. Other chronic pain  G89.29     resume 5 mg dose prednisone for the next 1-2 weeks while you work out with chronic pain m anagement and Gunter a adjunctive therapy that will allow for further steroid taper.

## 2020-05-26 NOTE — ED PROVIDER NOTES
History     Chief Complaint   Patient presents with     Pain Management     Is tapering from prednisone for her chronic pain and is having difficulty coping with her pain - crying in triage     HPI  Doretha Fernandez is a 45 year old female who presents with a history of depression, asthma, metastatic malignant melanoma, diabetes mellitus, immunosuppression, ulcerative colitis, lymphedema, chronic prednisone and Cushing syndrome, neutrophilia,    She is followed by pain management clinic.      They note that her pain had started with disseminated malignant melanoma left axilla when she had been on Opdivo.  This resulted in secondary colitis and and no inflammatory arthritis.  Chronic left-sided lymphedema.  She has been seen by pain management twice in May.  She had been on prednisone 7.5 to 5 mg taper painful ambulation.  Had been using Dilaudid 4 mg tablets.  Had been seen at [facility] in consultation as well.  She was seen in pain management clinic it was recommended that she not start with Dilaudid.  She was also encouraged to dispose of any leftover opioids.  She was recommended for trial of tramadol at the time of last visit as the prednisone was tapered.      Allergies:  Allergies   Allergen Reactions     Bee Venom Swelling     Azithromycin Diarrhea     Erythromycin      Other reaction(s): GI intolerance, Vomiting     Fentanyl Other (See Comments)     sweating  sweating     Prochlorperazine Fatigue     Other reaction(s): Other (see comments)  Fatigue     Buspirone      Other reaction(s): GI intolerance  vomiting     Erythrocin Nausea and Vomiting     Zithromax [Azithromycin Dihydrate] Diarrhea     Enbrel [Etanercept] Hives and Rash       Problem List:    Patient Active Problem List    Diagnosis Date Noted     Pain syndrome, chronic 02/12/2020     Priority: Medium     Drug-induced Cushing's syndrome (H) 02/12/2020     Priority: Medium     Diabetes mellitus, iatrogenic (H) 01/28/2020     Priority: Medium      High risk HPV infection 01/28/2020     Priority: Medium     Added automatically from request for surgery [id number]       Immunosuppression (H) 01/28/2020     Priority: Medium     Added automatically from request for surgery [id number]       STORMY (obstructive sleep apnea) 01/27/2020     Priority: Medium     1/2019 (241#)-AHI 24, lowest oxygen saturation was 86%, no periodic limb movement were noted, CPAP 8 cm/H20 was effective.       Secondary lymphedema 01/27/2020     Priority: Medium     Chronic neutrophilia 01/27/2020     Priority: Medium     Cervical high risk HPV (human papillomavirus) test positive 12/13/2019     Priority: Medium     2011 NIL pap.  2015 NIL pap, Neg HPV.  12/13/19 NIL pap , + HR HPV 16. Plan colp.   1/6/19 Failed colp exam secondary to anatomic constraints with gyn. Referred to gyn/onc.        Immunosuppressed status (H) 09/05/2019     Priority: Medium     Ulcerative colitis with complication, unspecified location (H) 09/05/2019     Priority: Medium     Morbid obesity (H) 09/05/2019     Priority: Medium     Arthritis, rheumatoid (H) 11/26/2018     Priority: Medium     Hypocalcemia 05/15/2018     Priority: Medium     Anemia 05/14/2018     Priority: Medium     Menstrual irregularity 05/14/2018     Priority: Medium     Abdominal pain 05/10/2018     Priority: Medium     Candidiasis of mouth 05/10/2018     Priority: Medium     Malaise 05/10/2018     Priority: Medium     Other chronic pain 05/06/2018     Priority: Medium     Rash and nonspecific skin eruption 04/05/2018     Priority: Medium     Bilateral leg cramps 03/07/2018     Priority: Medium     Intestinal giardiasis 03/05/2018     Priority: Medium     Rectal bleeding 03/04/2018     Priority: Medium     Colitis 03/01/2018     Priority: Medium     Functional diarrhea 02/22/2018     Priority: Medium     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/07/2017     Priority: Medium     Malignant melanoma of left  upper extremity including shoulder (H) 10/12/2017     Priority: Medium     Metastatic malignant melanoma (H) 10/10/2017     Priority: Medium     Prothrombin mutation (H) 04/05/2017     Priority: Medium     On daily aspirin 81 mg per hematology's recommendations from 2008       Vision changes 04/01/2017     Priority: Medium     Mild intermittent asthma without complication 11/08/2013     Priority: Medium     Mild major depression (H) 06/24/2013     Priority: Medium     Anxiety state 09/13/2012     Priority: Medium     Problem list name updated by automated process. Provider to review       Esophageal reflux 09/13/2012     Priority: Medium     DUB (dysfunctional uterine bleeding) 07/28/2011     Priority: Medium     CARDIOVASCULAR SCREENING; LDL GOAL LESS THAN 160 07/28/2011     Priority: Low        Past Medical History:    Past Medical History:   Diagnosis Date     Abnormal MRI      Anxiety      Basal cell carcinoma      Cervical high risk HPV (human papillomavirus) test positive 12/13/2019     Colitis      Depression      Diabetes mellitus, iatrogenic (H) 1/28/2020     Inflammatory arthritis      Insomnia      Lumbago      Lymphedema      Malignant melanoma (H)      Melanoma (H) 10/23/2017     Mild persistent asthma      Obesity      STORMY (obstructive sleep apnea)      Prothrombin deficiency (H)      Stroke (cerebrum) (H) 2004     TIA (transient ischemic attack) 2004     Type 2 diabetes mellitus (H)        Past Surgical History:    Past Surgical History:   Procedure Laterality Date     APPENDECTOMY  2004     COLONOSCOPY N/A 10/18/2017    Procedure: COLONOSCOPY;  Colon;  Surgeon: Debbie Stephens MD;  Location: [facility] OR     COLONOSCOPY N/A 3/9/2018    Procedure: COMBINED COLONOSCOPY, SINGLE OR MULTIPLE BIOPSY/POLYPECTOMY BY BIOPSY;  colon;  Surgeon: Benita Schumacher MD;  Location: [facility] GI     COLONOSCOPY      multiple since 2018 to present - about 6 total     DISSECT LYMPH NODE AXILLA Left 10/23/2017    Procedure: DISSECT  LYMPH NODE AXILLA;  Left Axillary Lymph Node Dissection ;  Surgeon: Laurent Cool MD;  Location: [facility] OR     EXAM UNDER ANESTHESIA PELVIC N/A 2020    Procedure: EXAM UNDER ANESTHESIA, PELVIS; with Cervical Biopsies, Vaginal Biopsy and Endocervical Curettings;  Surgeon: Melina Jung MD;  Location: [facility] OR     GYN SURGERY  ,          REPAIR MOHS Left 2017    Procedure: REPAIR MOHS;  Left Upper Lid Moh's Reconstruction;  Surgeon: Kisha Bosch MD;  Location: [facility] OR       Family History:    Family History   Problem Relation Age of Onset     Cancer Mother 45        lung     Neurologic Disorder Mother         epilepsy     Lipids Father      Gastrointestinal Disease Father         diverticulitis      Depression Father      Cancer Maternal Grandmother      Blood Disease Maternal Grandmother         lymphoma      Arthritis Maternal Grandmother      Diabetes Maternal Grandmother      Depression Maternal Grandmother      Macular Degeneration Maternal Grandmother      Glaucoma Maternal Grandmother      Diabetes Maternal Grandfather      Cerebrovascular Disease Maternal Grandfather      Blood Disease Maternal Grandfather      Heart Disease Maternal Grandfather      Glaucoma Maternal Grandfather      Cancer Paternal Grandmother      Cancer - colorectal Paternal Grandmother      Respiratory Paternal Grandfather         emphysema      Heart Disease Daughter      Asthma Daughter      Depression Sister      Melanoma No family hx of        Social History:  Marital Status:   [4]  Social History     Tobacco Use     Smoking status: Former Smoker     Packs/day: 1.00     Years: 5.00     Pack years: 5.00     Last attempt to quit: 3/20/1998     Years since quittin.2     Smokeless tobacco: Never Used   Substance Use Topics     Alcohol use: Yes     Comment: occ     Drug use: No        Medications:    Acetaminophen (TYLENOL PO)  albuterol (PROAIR HFA/PROVENTIL HFA/VENTOLIN HFA) 108 (90 Base) MCG/ACT  "inhaler  blood glucose (ACCU-CHEK GUIDE) test strip  blood glucose monitoring (ACCU-CHEK FASTCLIX) lancets  Calcium Carb-Cholecalciferol 600-800 MG-UNIT TABS  Calcium Carbonate (CALCIUM-CARB 600 PO)  ferrous fumarate 65 mg, [place]. FE,-Vitamin C 125 mg (VITRON-C)  MG TABS tablet  gabapentin (NEURONTIN) 300 MG capsule  hydrOXYzine (ATARAX) 25 MG tablet  metFORMIN (GLUCOPHAGE-XR) 500 MG 24 hr tablet  ondansetron (ZOFRAN) 8 MG tablet  order for DME  order for DME  order for DME  order for DME  oxyCODONE (ROXICODONE) 5 MG tablet  predniSONE (DELTASONE) 5 MG tablet  predniSONE (DELTASONE) 5 MG tablet  predniSONE (DELTASONE) 5 MG tablet  venlafaxine (EFFEXOR-ER) 225 MG 24 hr tablet  VITAMIN D3 25 MCG (1000 UT) tablet          Review of Systems   Constitutional: Negative for chills, diaphoresis and fever.   HENT: Negative for ear pain, sinus pressure and sore throat.    Eyes: Negative for visual disturbance.   Respiratory: Negative for cough, shortness of breath and wheezing.    Cardiovascular: Positive for leg swelling. Negative for chest pain and palpitations.   Gastrointestinal: Negative for abdominal pain, blood in stool, constipation, diarrhea, nausea and vomiting.   Genitourinary: Negative for dysuria, frequency and urgency.   Skin: Negative for rash.   Neurological: Negative for headaches.   All other systems reviewed and are negative.      Physical Exam   BP: (!) 141/91  Pulse: 104  Temp: 98.3  F (36.8  C)  Resp: 22  Height: 160 cm (5' 3\")  Weight: 111.1 kg (245 lb)  SpO2: 97 %    Physical Exam  Constitutional:       General: She is in acute distress.   HENT:      Nose: No rhinorrhea.   Eyes:      Conjunctiva/sclera: Conjunctivae normal.   Neck:      Musculoskeletal: Neck supple.   Cardiovascular:      Rate and Rhythm: Normal rate and regular rhythm.      Pulses: Normal pulses.      Heart sounds: No murmur.   Pulmonary:      Effort: Pulmonary effort is normal. No respiratory distress.      Breath sounds: Normal " breath sounds. No wheezing.   Abdominal:      General: Abdomen is flat. Bowel sounds are normal. There is no distension.      Palpations: Abdomen is soft. There is no mass.      Tenderness: There is no abdominal tenderness.   Musculoskeletal:      Right lower leg: No edema.      Left lower leg: No edema.   Skin:     Findings: No rash.   Neurological:      General: No focal deficit present.      Mental Status: She is alert.        tearful      ED Course        Procedures               Critical Care time:  none               No results found for this or any previous visit (from the past 24 hour(s)).    Medications - No data to display    Assessments & Plan (with Medical Decision Making)     MDM: Doretha Fernandez is a 45 year old female presents with a history of chronic pain and who is had chronic prednisone on very slow taper used for both rheumatoid arthritis and colitis with complications of iatrogenic diabetes and Cushing's.  She is now down to 2.5 mg for the last week and is tolerating this very poorly despite pain management seeing her last May 15 and seeing her regularly.  Also following with mental health.    She was just seen over the weekend on Sunday for an annual ankle injury.  She is tearful here and is overwhelmed with the increasing pain.  There is no focal new pain.  We discussed going back up from her 2.5 back to 5 mg orally daily with first dose here and she is given 1 dose of oral oxycodone here.  She will resume 5 mg daily and speak with her pain management specialist as scheduled tomorrow.  Precautions given for return.  I have reviewed the nursing notes.    I have reviewed the findings, diagnosis, plan and need for follow up with the patient.       New Prescriptions    No medications on file       Final diagnoses:   Drug-induced Cushing's syndrome (H)   Rheumatoid arthritis, involving unspecified site, unspecified rheumatoid factor presence (H)   Colitis   Other chronic pain - resume 5 mg dose  prednisone for the next 1-2 weeks while you work out with chronic pain m anagement and Gunter a adjunctive therapy that will allow for further steroid taper.       5/26/2020   [facility] EMERGENCY DEPARTMENT     Cezar Bryan MD  05/26/20 1104

## 2020-05-26 NOTE — TELEPHONE ENCOUNTER
Reason for Call:  Medication or medication refill:    Do you use a [facility]?  Name of the pharmacy and phone number for the current request:  [facility] [phone]    Name of the medication requested: Prednisone  Last Written Prescription Date:  3/23/20  Last Fill Quantity: 93,  # refills: 0   Last office visit: 2/12/2020 with prescribing provider:     Future Office Visit:      Other request:       Call taken on 5/26/2020 at 3:11 PM by Jena Quintero

## 2020-05-26 NOTE — ED AVS SNAPSHOT
[facility]  [address] [address]  Phone:  [phone]  Fax:  [phone]                                    Doretha Fernandez   MRN: [id number]    Department:  [facility]   Date of Visit:  5/26/2020           After Visit Summary Signature Page    I have received my discharge instructions, and my questions have been answered. I have discussed any challenges I see with this plan with the nurse or doctor.    ..........................................................................................................................................  Patient/Patient Representative Signature      ..........................................................................................................................................  Patient Representative Print Name and Relationship to Patient    ..................................................               ................................................  Date                                   Time    ..........................................................................................................................................  Reviewed by Signature/Title    ...................................................              ..............................................  Date                                               Time          22EPIC Rev 08/18

## 2020-05-27 ENCOUNTER — OFFICE VISIT (OUTPATIENT)
Dept: ORTHOPEDICS | Facility: CLINIC | Age: 45
End: 2020-05-27
Payer: COMMERCIAL

## 2020-05-27 VITALS
DIASTOLIC BLOOD PRESSURE: 85 MMHG | WEIGHT: 245 LBS | HEART RATE: 86 BPM | HEIGHT: 63 IN | SYSTOLIC BLOOD PRESSURE: 131 MMHG | BODY MASS INDEX: 43.41 KG/M2

## 2020-05-27 DIAGNOSIS — S99.912A ANKLE INJURY, LEFT, INITIAL ENCOUNTER: ICD-10-CM

## 2020-05-27 DIAGNOSIS — S93.402A SPRAIN OF LEFT ANKLE, UNSPECIFIED LIGAMENT, INITIAL ENCOUNTER: Primary | ICD-10-CM

## 2020-05-27 PROCEDURE — 99203 OFFICE O/P NEW LOW 30 MIN: CPT | Performed by: ORTHOPAEDIC SURGERY

## 2020-05-27 RX ORDER — ACETAMINOPHEN 650 MG/1
TABLET, FILM COATED, EXTENDED RELEASE ORAL
COMMUNITY
Start: 2020-05-22 | End: 2021-04-13

## 2020-05-27 RX ORDER — TRAMADOL HYDROCHLORIDE 50 MG/1
50-100 TABLET ORAL
COMMUNITY
Start: 2020-05-27 | End: 2020-06-26

## 2020-05-27 ASSESSMENT — MIFFLIN-ST. JEOR: SCORE: 1730.44

## 2020-05-27 ASSESSMENT — PAIN SCALES - GENERAL: PAINLEVEL: SEVERE PAIN (6)

## 2020-05-27 NOTE — PROGRESS NOTES
chief complaint:   Chief Complaint   Patient presents with     Left Ankle - Injury     DOI 5/23/20, 4 day s/p. Patient was walking down the steps her foot went under the board and her and her foot went straight down. Patient was seen and given a boot and crutches. Pain is around the entire ankle. She is wearing a gray      Ankle Pain     walking boot and using crutches. She is unable to use crutches because she is chronic pain and her whole body hurts. She was told not to weightbear. Swelling has gone down, she has been icing.       HISTORY OF PRESENT ILLNESS    Doretha Fernandez is a 45 year old female seen for evaluation of a left ankle injury that occurred 4 days ago. The injury occurred on 5/23/2020 when walking down some stairs and rolled her foot/ankle. Onset of pain. Presented to the emergency room with xrays negative for fracture. Given a boot. Pain in the ankle. Using crutches but has difficulties due to chronic pain in her whole body. She's been icing and elevating. She thinks the swelling is better. Pain     Reports left ankle sprain over a year ago. Also chronic foot/ankle/achilles pains with numbness and tingling. She knows her current pain is made worse by her chronic pain in that foot/ankle.      Present symptoms: moderate pain, moderate swelling.    Symptoms occur all the time.    The frequency of symptoms: are constant.  Denies associated numbness or tingling but states has numbness and tingling at baseline.   Pain severity: 6/10  Pain quality: dull, aching and sharp    Treatment up to this point: boot, ice, elevatoin, pain medications.  Prior history of related problems: yes, chronic ankle/foot pain, left ankle sprain 3/2019.      Other PMH:  has a past medical history of Abnormal MRI, Anxiety, Basal cell carcinoma, Cervical high risk HPV (human papillomavirus) test positive (12/13/2019), Colitis, Depression, Diabetes mellitus, iatrogenic (H) (1/28/2020), Inflammatory arthritis, Insomnia, Lumbago,  Lymphedema, Malignant melanoma (H), Melanoma (H) (10/23/2017), Mild persistent asthma, Obesity, STORMY (obstructive sleep apnea), Prothrombin deficiency (H), Stroke (cerebrum) (H) (2004), TIA (transient ischemic attack) (2004), and Type 2 diabetes mellitus (H). She also has no past medical history of Hypertension, PONV (postoperative nausea and vomiting), or Squamous cell carcinoma.   Patient Active Problem List    Diagnosis Date Noted     Pain syndrome, chronic 02/12/2020     Priority: Medium     Drug-induced Cushing's syndrome (H) 02/12/2020     Priority: Medium     Diabetes mellitus, iatrogenic (H) 01/28/2020     Priority: Medium     High risk HPV infection 01/28/2020     Priority: Medium     Added automatically from request for surgery [id number]       Immunosuppression (H) 01/28/2020     Priority: Medium     Added automatically from request for surgery [id number]       STORMY (obstructive sleep apnea) 01/27/2020     Priority: Medium     1/2019 (241#)-AHI 24, lowest oxygen saturation was 86%, no periodic limb movement were noted, CPAP 8 cm/H20 was effective.       Secondary lymphedema 01/27/2020     Priority: Medium     Chronic neutrophilia 01/27/2020     Priority: Medium     Cervical high risk HPV (human papillomavirus) test positive 12/13/2019     Priority: Medium     2011 NIL pap.  2015 NIL pap, Neg HPV.  12/13/19 NIL pap , + HR HPV 16. Plan colp.   1/6/19 Failed colp exam secondary to anatomic constraints with gyn. Referred to gyn/onc.        Immunosuppressed status (H) 09/05/2019     Priority: Medium     Ulcerative colitis with complication, unspecified location (H) 09/05/2019     Priority: Medium     Morbid obesity (H) 09/05/2019     Priority: Medium     Arthritis, rheumatoid (H) 11/26/2018     Priority: Medium     Hypocalcemia 05/15/2018     Priority: Medium     Anemia 05/14/2018     Priority: Medium     Menstrual irregularity 05/14/2018     Priority: Medium     Abdominal pain 05/10/2018     Priority: Medium      Candidiasis of mouth 05/10/2018     Priority: Medium     Malaise 05/10/2018     Priority: Medium     Other chronic pain 05/06/2018     Priority: Medium     Rash and nonspecific skin eruption 04/05/2018     Priority: Medium     Bilateral leg cramps 03/07/2018     Priority: Medium     Intestinal giardiasis 03/05/2018     Priority: Medium     Rectal bleeding 03/04/2018     Priority: Medium     Colitis 03/01/2018     Priority: Medium     Functional diarrhea 02/22/2018     Priority: Medium     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/07/2017     Priority: Medium     Malignant melanoma of left upper extremity including shoulder (H) 10/12/2017     Priority: Medium     Metastatic malignant melanoma (H) 10/10/2017     Priority: Medium     Prothrombin mutation (H) 04/05/2017     Priority: Medium     On daily aspirin 81 mg per hematology's recommendations from 2008       Vision changes 04/01/2017     Priority: Medium     Mild intermittent asthma without complication 11/08/2013     Priority: Medium     Mild major depression (H) 06/24/2013     Priority: Medium     Anxiety state 09/13/2012     Priority: Medium     Problem list name updated by automated process. Provider to review       Esophageal reflux 09/13/2012     Priority: Medium     DUB (dysfunctional uterine bleeding) 07/28/2011     Priority: Medium     CARDIOVASCULAR SCREENING; LDL GOAL LESS THAN 160 07/28/2011     Priority: Low         Surgical Hx:  has a past surgical history that includes GYN surgery (2004, 2007); Repair MOHS (Left, 7/25/2017); Colonoscopy (N/A, 10/18/2017); Dissect lymph node axilla (Left, 10/23/2017); Colonoscopy (N/A, 3/9/2018); appendectomy (2004); colonoscopy; and Exam under anesthesia pelvic (N/A, 2/25/2020).    Medications:   Current Outpatient Medications:      Acetaminophen (TYLENOL PO), Take 1,000 mg by mouth every 8 hours as needed for mild pain or fever (Pt last dose 02/10/20) , Disp: , Rfl:       albuterol (PROAIR HFA/PROVENTIL HFA/VENTOLIN HFA) 108 (90 Base) MCG/ACT inhaler, Inhale 2 puffs into the lungs 4 times daily (Patient taking differently: Inhale 2 puffs into the lungs 4 times daily Pt has not needed to use 2/10/20), Disp: 1 Inhaler, Rfl: 3     blood glucose (ACCU-CHEK GUIDE) test strip, 1 strip by In Vitro route 2 times daily Use to test blood sugar 2 times daily or as directed., Disp: 200 strip, Rfl: 11     blood glucose monitoring (ACCU-CHEK FASTCLIX) lancets, 1 each by In Vitro route 2 times daily Use to test blood sugar 2 times daily or as directed.  Ok to substitute alternative if insurance prefers., Disp: 102 each, Rfl: 11     Calcium Carb-Cholecalciferol 600-800 MG-UNIT TABS, Take 800 mg by mouth daily before breakfast, Disp: , Rfl:      Calcium Carbonate (CALCIUM-CARB 600 PO), Take 1 tablet by mouth as needed (Pt last took 1 week ago 2/10/20) , Disp: , Rfl:      ferrous fumarate 65 mg, [place]. FE,-Vitamin C 125 mg (VITRON-C)  MG TABS tablet, Take 1 tablet by mouth daily, Disp: 30 tablet, Rfl: 3     gabapentin (NEURONTIN) 300 MG capsule, Take 300 mg by mouth 3 times daily, Disp: , Rfl:      hydrOXYzine (ATARAX) 25 MG tablet, Take 1 tablet (25 mg) by mouth At Bedtime (Patient taking differently: Take 25 mg by mouth At Bedtime Pt has not taken in past 2 weeks 2/10/20), Disp: 90 tablet, Rfl: 1     metFORMIN (GLUCOPHAGE-XR) 500 MG 24 hr tablet, Take 2 tablets (1,000 mg) by mouth daily (with dinner) (Patient taking differently: Take 500 mg by mouth 2 times daily Transitioned to this schedule 1 week ago 2/10/20), Disp: 360 tablet, Rfl: 1     ondansetron (ZOFRAN) 8 MG tablet, Take 8 mg by mouth every 8 hours as needed for nausea (Pt has not taken in past year 2/10/20) , Disp: , Rfl:      order for DME, Equipment being ordered: X2 [employer]arease Compression shirt., Disp: 2 each, Rfl: 1     order for DME, Equipment being ordered: 20-30 mmHg compression sleeve and 20-30 mmHg compression glove x 2  "pair., Disp: 2 each, Rfl: 1     order for DME, Equipment being ordered: x2 Wearease compression shirt, Disp: 1 each, Rfl: 0     order for DME, Equipment being ordered: 20-30mmHg compression sleeve and 20-30mmHg compression glove\" x2 pair, Disp: 1 Units, Rfl: 0     oxyCODONE (ROXICODONE) 5 MG tablet, Take 1 tablet (5 mg) by mouth every 6 hours as needed for pain, Disp: 10 tablet, Rfl: 0     predniSONE (DELTASONE) 5 MG tablet, Take 2.5 tablets (12.5 mg) by mouth daily for 9 days, THEN 2 tablets (10 mg) daily for 14 days, THEN 1.5 tablets (7.5 mg) daily for 14 days, THEN 1 tablet (5 mg) daily for 14 days, THEN 0.5 tablets (2.5 mg) daily for 14 days., Disp: 93 tablet, Rfl: 0     predniSONE (DELTASONE) 5 MG tablet, Prednisone taper: Starting @ Prednisone 17.5 mg/day and decreasing by 2.5 mg every 2 weeks until off this med., Disp: 100 tablet, Rfl: 0     predniSONE (DELTASONE) 5 MG tablet, Take 1 tablet (5 mg) by mouth daily Prednisone taper: 30 mg p.o. daily x7 days. 27.5 mg p.o. daily x7 days. 25 mg p.o. daily x7 days. 22.5 mg p.o. daily x7 days. (Patient taking differently: Take 20 mg by mouth daily Prednisone taper: 30 mg p.o. daily x7 days. 27.5 mg p.o. daily x7 days. 25 mg p.o. daily x7 days. 22.5 mg p.o. daily x7 days.), Disp: 147 tablet, Rfl: 0     venlafaxine (EFFEXOR-ER) 225 MG 24 hr tablet, TAKE ONE TABLET BY MOUTH ONCE DAILY, Disp: 90 tablet, Rfl: 0     VITAMIN D3 25 MCG (1000 UT) tablet, TAKE THREE TABLETS BY MOUTH EVERY DAY, Disp: 300 tablet, Rfl: 0  No current facility-administered medications for this visit.     Facility-Administered Medications Ordered in Other Visits:      lidocaine 1 % 9 mL, 9 mL, Intradermal, Once, Anna Naidu MD     sodium bicarbonate 8.4 % injection 1 mEq, 1 mEq, Intradermal, Once, Anna Naidu MD    Allergies:   Allergies   Allergen Reactions     Bee Venom Swelling     Azithromycin Diarrhea     Erythromycin      Other reaction(s): GI intolerance, Vomiting     Fentanyl Other " "(See Comments)     sweating  sweating     Prochlorperazine Fatigue     Other reaction(s): Other (see comments)  Fatigue     Buspirone      Other reaction(s): GI intolerance  vomiting     Erythrocin Nausea and Vomiting     Zithromax [Azithromycin Dihydrate] Diarrhea     Enbrel [Etanercept] Hives and Rash       Social Hx:  reports that she quit smoking about 22 years ago. She has a 5.00 pack-year smoking history. She has never used smokeless tobacco. She reports current alcohol use. She reports that she does not use drugs.    Family Hx: family history includes Arthritis in her maternal grandmother; Asthma in her daughter; Blood Disease in her maternal grandfather and maternal grandmother; Cancer in her maternal grandmother and paternal grandmother; Cancer (age of onset: 45) in her mother; Cancer - colorectal in her paternal grandmother; Cerebrovascular Disease in her maternal grandfather; Depression in her father, maternal grandmother, and sister; Diabetes in her maternal grandfather and maternal grandmother; Gastrointestinal Disease in her father; Glaucoma in her maternal grandfather and maternal grandmother; Heart Disease in her daughter and maternal grandfather; Lipids in her father; Macular Degeneration in her maternal grandmother; Neurologic Disorder in her mother; Respiratory in her paternal grandfather.    REVIEW OF SYSTEMS:    CONSTITUTIONAL:NEGATIVE for fever, chills, change in weight  INTEGUMENTARY/SKIN: NEGATIVE for worrisome rashes, moles or lesions  MUSCULOSKELETAL:See HPI above  NEURO: NEGATIVE for weakness, dizziness or paresthesias    PHYSICAL EXAM:  /85   Pulse 86   Ht 1.6 m (5' 3\")   Wt 111.1 kg (245 lb)   BMI 43.40 kg/m     GENERAL APPEARANCE: healthy, alert, no distress  SKIN: no suspicious lesions or rashes  NEURO: Normal strength and tone, mentation intact and speech normal  PSYCH:  mentation appears normal and affect normal  RESPIRATORY: No increased work of breathing.    BILATERAL " LOWER EXTREMITIES:  Gait: using crutches for support    Left lower extremity:  Intact sensation deep peroneal nerve, superficial peroneal nerve, med/lat tibial nerve, sural nerve, saphenous nerve  Intact EHL, EDL, TA, FHL, GS, quadriceps hamstrings and hip flexors  Toes warm and well perfused, brisk capillary refill. Palpable 2+ dp pulses.  calf soft and nttp or squeeze.      left ANKLE / FOOT / LEG  Inspection:Swelling:diffuse ankle, foot dorsum; no ecchymosis of the foot, ankle  Tender: essentially tender from the knee to the toes, most focal at the medial and lateral malleolus   Non-tender: none.  Range of Motion: limited by discomfort.  Strength: grossly intact.  Special tests:negative anterior drawer, negative talar tilt, negative Pugh sign        X-RAY:  3 views left ankle, 3 views left foot, 2 views left tibia/fibula from 5/24/2020 were reviewed in clinic today. On my review, no obvious acute fracture or dislocation. Chronic ossicles adjacent to tip of medial malleolus previously seen on images 3/2019. Symmetric ankle mortise. Diffuse soft tissue swelling of the ankle and foot.        Impression: 46yo female with chronic pain syndrome, acute left ankle pain following injury, sprain.    Plan:  * rest  * ice  * elevation  * compression  * activity modification - avoid aggravating activities  * ankle brace/support in lace-up, supportive shoes once able to tolerate weight bearing in boot and wean from boot.  * ankle range of motion exercises  * Physical Therapy, strengthening and proprioception training  * NSAIDS  * return to clinic as needed, 2-3 weeks if not improving..        Chris Medrano M.D., M.S.  Dept. of Orthopaedic Surgery  [facility]

## 2020-05-27 NOTE — TELEPHONE ENCOUNTER
Routing refill request to provider for review/approval because:  Drug not on the [facility] refill protocol   Was in ED yesterday and told to go back up to 5mg daily and recheck with pain specialist at [facility].  She will be out before that appt.     Rupal Fleming RN

## 2020-05-27 NOTE — TELEPHONE ENCOUNTER
When is apt with pain specialist at [facility]?  I feel like we need to have a plan for the prednisone.      Anna Naidu M.D.

## 2020-05-27 NOTE — PROGRESS NOTES
Patient was fitted with a medium f-8 ankle brace. All questions were answered to patient's satisfaction. DME form explained, signed, and copy given to the patient for their records.   Shirley Johnson Clinical Medical Assistant

## 2020-05-27 NOTE — TELEPHONE ENCOUNTER
Requested Prescriptions   Pending Prescriptions Disp Refills     predniSONE (DELTASONE) 5 MG tablet 93 tablet 0     Sig: Take 2.5 tablets (12.5 mg) by mouth daily for 9 days, THEN 2 tablets (10 mg) daily for 14 days, THEN 1.5 tablets (7.5 mg) daily for 14 days, THEN 1 tablet (5 mg) daily for 14 days, THEN 0.5 tablets (2.5 mg) daily for 14 days.       There is no refill protocol information for this order        Last Written Prescription Date:  3/23/20  Last Fill Quantity: 93,  # refills: 0   Last office visit: 2/12/2020 with prescribing provider:  Evangelista   Future Office Visit:

## 2020-05-27 NOTE — LETTER
5/27/2020         RE: Doretha Fernandez  [address] [facility]  [place] [address]        Dear Colleague,    Thank you for referring your patient, Doretha Fernandez, to the [facility]. Please see a copy of my visit note below.    chief complaint:   Chief Complaint   Patient presents with     Left Ankle - Injury     DOI 5/23/20, 4 day s/p. Patient was walking down the steps her foot went under the board and her and her foot went straight down. Patient was seen and given a boot and crutches. Pain is around the entire ankle. She is wearing a gray      Ankle Pain     walking boot and using crutches. She is unable to use crutches because she is chronic pain and her whole body hurts. She was told not to weightbear. Swelling has gone down, she has been icing.       HISTORY OF PRESENT ILLNESS    Doretha Fernandez is a 45 year old female seen for evaluation of a left ankle injury that occurred 4 days ago. The injury occurred on 5/23/2020 when walking down some stairs and rolled her foot/ankle. Onset of pain. Presented to the emergency room with xrays negative for fracture. Given a boot. Pain in the ankle. Using crutches but has difficulties due to chronic pain in her whole body. She's been icing and elevating. She thinks the swelling is better. Pain     Reports left ankle sprain over a year ago. Also chronic foot/ankle/achilles pains with numbness and tingling. She knows her current pain is made worse by her chronic pain in that foot/ankle.      Present symptoms: moderate pain, moderate swelling.    Symptoms occur all the time.    The frequency of symptoms: are constant.  Denies associated numbness or tingling but states has numbness and tingling at baseline.   Pain severity: 6/10  Pain quality: dull, aching and sharp    Treatment up to this point: boot, ice, elevatoin, pain medications.  Prior history of related problems: yes, chronic ankle/foot pain, left ankle sprain 3/2019.      Other PMH:  has  a past medical history of Abnormal MRI, Anxiety, Basal cell carcinoma, Cervical high risk HPV (human papillomavirus) test positive (12/13/2019), Colitis, Depression, Diabetes mellitus, iatrogenic (H) (1/28/2020), Inflammatory arthritis, Insomnia, Lumbago, Lymphedema, Malignant melanoma (H), Melanoma (H) (10/23/2017), Mild persistent asthma, Obesity, STORMY (obstructive sleep apnea), Prothrombin deficiency (H), Stroke (cerebrum) (H) (2004), TIA (transient ischemic attack) (2004), and Type 2 diabetes mellitus (H). She also has no past medical history of Hypertension, PONV (postoperative nausea and vomiting), or Squamous cell carcinoma.   Patient Active Problem List    Diagnosis Date Noted     Pain syndrome, chronic 02/12/2020     Priority: Medium     Drug-induced Cushing's syndrome (H) 02/12/2020     Priority: Medium     Diabetes mellitus, iatrogenic (H) 01/28/2020     Priority: Medium     High risk HPV infection 01/28/2020     Priority: Medium     Added automatically from request for surgery [id number]       Immunosuppression (H) 01/28/2020     Priority: Medium     Added automatically from request for surgery [id number]       STORMY (obstructive sleep apnea) 01/27/2020     Priority: Medium     1/2019 (241#)-AHI 24, lowest oxygen saturation was 86%, no periodic limb movement were noted, CPAP 8 cm/H20 was effective.       Secondary lymphedema 01/27/2020     Priority: Medium     Chronic neutrophilia 01/27/2020     Priority: Medium     Cervical high risk HPV (human papillomavirus) test positive 12/13/2019     Priority: Medium     2011 NIL pap.  2015 NIL pap, Neg HPV.  12/13/19 NIL pap , + HR HPV 16. Plan colp.   1/6/19 Failed colp exam secondary to anatomic constraints with gyn. Referred to gyn/onc.        Immunosuppressed status (H) 09/05/2019     Priority: Medium     Ulcerative colitis with complication, unspecified location (H) 09/05/2019     Priority: Medium     Morbid obesity (H) 09/05/2019     Priority: Medium     Arthritis,  rheumatoid (H) 11/26/2018     Priority: Medium     Hypocalcemia 05/15/2018     Priority: Medium     Anemia 05/14/2018     Priority: Medium     Menstrual irregularity 05/14/2018     Priority: Medium     Abdominal pain 05/10/2018     Priority: Medium     Candidiasis of mouth 05/10/2018     Priority: Medium     Malaise 05/10/2018     Priority: Medium     Other chronic pain 05/06/2018     Priority: Medium     Rash and nonspecific skin eruption 04/05/2018     Priority: Medium     Bilateral leg cramps 03/07/2018     Priority: Medium     Intestinal giardiasis 03/05/2018     Priority: Medium     Rectal bleeding 03/04/2018     Priority: Medium     Colitis 03/01/2018     Priority: Medium     Functional diarrhea 02/22/2018     Priority: Medium     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/07/2017     Priority: Medium     Malignant melanoma of left upper extremity including shoulder (H) 10/12/2017     Priority: Medium     Metastatic malignant melanoma (H) 10/10/2017     Priority: Medium     Prothrombin mutation (H) 04/05/2017     Priority: Medium     On daily aspirin 81 mg per hematology's recommendations from 2008       Vision changes 04/01/2017     Priority: Medium     Mild intermittent asthma without complication 11/08/2013     Priority: Medium     Mild major depression (H) 06/24/2013     Priority: Medium     Anxiety state 09/13/2012     Priority: Medium     Problem list name updated by automated process. Provider to review       Esophageal reflux 09/13/2012     Priority: Medium     DUB (dysfunctional uterine bleeding) 07/28/2011     Priority: Medium     CARDIOVASCULAR SCREENING; LDL GOAL LESS THAN 160 07/28/2011     Priority: Low         Surgical Hx:  has a past surgical history that includes GYN surgery (2004, 2007); Repair MOHS (Left, 7/25/2017); Colonoscopy (N/A, 10/18/2017); Dissect lymph node axilla (Left, 10/23/2017); Colonoscopy (N/A, 3/9/2018); appendectomy (2004);  colonoscopy; and Exam under anesthesia pelvic (N/A, 2/25/2020).    Medications:   Current Outpatient Medications:      Acetaminophen (TYLENOL PO), Take 1,000 mg by mouth every 8 hours as needed for mild pain or fever (Pt last dose 02/10/20) , Disp: , Rfl:      albuterol (PROAIR HFA/PROVENTIL HFA/VENTOLIN HFA) 108 (90 Base) MCG/ACT inhaler, Inhale 2 puffs into the lungs 4 times daily (Patient taking differently: Inhale 2 puffs into the lungs 4 times daily Pt has not needed to use 2/10/20), Disp: 1 Inhaler, Rfl: 3     blood glucose (ACCU-CHEK GUIDE) test strip, 1 strip by In Vitro route 2 times daily Use to test blood sugar 2 times daily or as directed., Disp: 200 strip, Rfl: 11     blood glucose monitoring (ACCU-CHEK FASTCLIX) lancets, 1 each by In Vitro route 2 times daily Use to test blood sugar 2 times daily or as directed.  Ok to substitute alternative if insurance prefers., Disp: 102 each, Rfl: 11     Calcium Carb-Cholecalciferol 600-800 MG-UNIT TABS, Take 800 mg by mouth daily before breakfast, Disp: , Rfl:      Calcium Carbonate (CALCIUM-CARB 600 PO), Take 1 tablet by mouth as needed (Pt last took 1 week ago 2/10/20) , Disp: , Rfl:      ferrous fumarate 65 mg, [place]. FE,-Vitamin C 125 mg (VITRON-C)  MG TABS tablet, Take 1 tablet by mouth daily, Disp: 30 tablet, Rfl: 3     gabapentin (NEURONTIN) 300 MG capsule, Take 300 mg by mouth 3 times daily, Disp: , Rfl:      hydrOXYzine (ATARAX) 25 MG tablet, Take 1 tablet (25 mg) by mouth At Bedtime (Patient taking differently: Take 25 mg by mouth At Bedtime Pt has not taken in past 2 weeks 2/10/20), Disp: 90 tablet, Rfl: 1     metFORMIN (GLUCOPHAGE-XR) 500 MG 24 hr tablet, Take 2 tablets (1,000 mg) by mouth daily (with dinner) (Patient taking differently: Take 500 mg by mouth 2 times daily Transitioned to this schedule 1 week ago 2/10/20), Disp: 360 tablet, Rfl: 1     ondansetron (ZOFRAN) 8 MG tablet, Take 8 mg by mouth every 8 hours as needed for nausea (Pt has  "not taken in past year 2/10/20) , Disp: , Rfl:      order for DME, Equipment being ordered: X2 Wearease Compression shirt., Disp: 2 each, Rfl: 1     order for DME, Equipment being ordered: 20-30 mmHg compression sleeve and 20-30 mmHg compression glove x 2 pair., Disp: 2 each, Rfl: 1     order for DME, Equipment being ordered: x2 Wearease compression shirt, Disp: 1 each, Rfl: 0     order for DME, Equipment being ordered: 20-30mmHg compression sleeve and 20-30mmHg compression glove\" x2 pair, Disp: 1 Units, Rfl: 0     oxyCODONE (ROXICODONE) 5 MG tablet, Take 1 tablet (5 mg) by mouth every 6 hours as needed for pain, Disp: 10 tablet, Rfl: 0     predniSONE (DELTASONE) 5 MG tablet, Take 2.5 tablets (12.5 mg) by mouth daily for 9 days, THEN 2 tablets (10 mg) daily for 14 days, THEN 1.5 tablets (7.5 mg) daily for 14 days, THEN 1 tablet (5 mg) daily for 14 days, THEN 0.5 tablets (2.5 mg) daily for 14 days., Disp: 93 tablet, Rfl: 0     predniSONE (DELTASONE) 5 MG tablet, Prednisone taper: Starting @ Prednisone 17.5 mg/day and decreasing by 2.5 mg every 2 weeks until off this med., Disp: 100 tablet, Rfl: 0     predniSONE (DELTASONE) 5 MG tablet, Take 1 tablet (5 mg) by mouth daily Prednisone taper: 30 mg p.o. daily x7 days. 27.5 mg p.o. daily x7 days. 25 mg p.o. daily x7 days. 22.5 mg p.o. daily x7 days. (Patient taking differently: Take 20 mg by mouth daily Prednisone taper: 30 mg p.o. daily x7 days. 27.5 mg p.o. daily x7 days. 25 mg p.o. daily x7 days. 22.5 mg p.o. daily x7 days.), Disp: 147 tablet, Rfl: 0     venlafaxine (EFFEXOR-ER) 225 MG 24 hr tablet, TAKE ONE TABLET BY MOUTH ONCE DAILY, Disp: 90 tablet, Rfl: 0     VITAMIN D3 25 MCG (1000 UT) tablet, TAKE THREE TABLETS BY MOUTH EVERY DAY, Disp: 300 tablet, Rfl: 0  No current facility-administered medications for this visit.     Facility-Administered Medications Ordered in Other Visits:      lidocaine 1 % 9 mL, 9 mL, Intradermal, Once, Anna Naidu MD     sodium " "bicarbonate 8.4 % injection 1 mEq, 1 mEq, Intradermal, Once, Anna Naidu MD    Allergies:   Allergies   Allergen Reactions     Bee Venom Swelling     Azithromycin Diarrhea     Erythromycin      Other reaction(s): GI intolerance, Vomiting     Fentanyl Other (See Comments)     sweating  sweating     Prochlorperazine Fatigue     Other reaction(s): Other (see comments)  Fatigue     Buspirone      Other reaction(s): GI intolerance  vomiting     Erythrocin Nausea and Vomiting     Zithromax [Azithromycin Dihydrate] Diarrhea     Enbrel [Etanercept] Hives and Rash       Social Hx:  reports that she quit smoking about 22 years ago. She has a 5.00 pack-year smoking history. She has never used smokeless tobacco. She reports current alcohol use. She reports that she does not use drugs.    Family Hx: family history includes Arthritis in her maternal grandmother; Asthma in her daughter; Blood Disease in her maternal grandfather and maternal grandmother; Cancer in her maternal grandmother and paternal grandmother; Cancer (age of onset: 45) in her mother; Cancer - colorectal in her paternal grandmother; Cerebrovascular Disease in her maternal grandfather; Depression in her father, maternal grandmother, and sister; Diabetes in her maternal grandfather and maternal grandmother; Gastrointestinal Disease in her father; Glaucoma in her maternal grandfather and maternal grandmother; Heart Disease in her daughter and maternal grandfather; Lipids in her father; Macular Degeneration in her maternal grandmother; Neurologic Disorder in her mother; Respiratory in her paternal grandfather.    REVIEW OF SYSTEMS:    CONSTITUTIONAL:NEGATIVE for fever, chills, change in weight  INTEGUMENTARY/SKIN: NEGATIVE for worrisome rashes, moles or lesions  MUSCULOSKELETAL:See HPI above  NEURO: NEGATIVE for weakness, dizziness or paresthesias    PHYSICAL EXAM:  /85   Pulse 86   Ht 1.6 m (5' 3\")   Wt 111.1 kg (245 lb)   BMI 43.40 kg/m     GENERAL " APPEARANCE: healthy, alert, no distress  SKIN: no suspicious lesions or rashes  NEURO: Normal strength and tone, mentation intact and speech normal  PSYCH:  mentation appears normal and affect normal  RESPIRATORY: No increased work of breathing.    BILATERAL LOWER EXTREMITIES:  Gait: using crutches for support    Left lower extremity:  Intact sensation deep peroneal nerve, superficial peroneal nerve, med/lat tibial nerve, sural nerve, saphenous nerve  Intact EHL, EDL, TA, FHL, GS, quadriceps hamstrings and hip flexors  Toes warm and well perfused, brisk capillary refill. Palpable 2+ dp pulses.  calf soft and nttp or squeeze.      left ANKLE / FOOT / LEG  Inspection:Swelling:diffuse ankle, foot dorsum; no ecchymosis of the foot, ankle  Tender: essentially tender from the knee to the toes, most focal at the medial and lateral malleolus   Non-tender: none.  Range of Motion: limited by discomfort.  Strength: grossly intact.  Special tests:negative anterior drawer, negative talar tilt, negative Pugh sign        X-RAY:  3 views left ankle, 3 views left foot, 2 views left tibia/fibula from 5/24/2020 were reviewed in clinic today. On my review, no obvious acute fracture or dislocation. Chronic ossicles adjacent to tip of medial malleolus previously seen on images 3/2019. Symmetric ankle mortise. Diffuse soft tissue swelling of the ankle and foot.        Impression: 46yo female with chronic pain syndrome, acute left ankle pain following injury, sprain.    Plan:  * rest  * ice  * elevation  * compression  * activity modification - avoid aggravating activities  * ankle brace/support in lace-up, supportive shoes once able to tolerate weight bearing in boot and wean from boot.  * ankle range of motion exercises  * Physical Therapy, strengthening and proprioception training  * NSAIDS  * return to clinic as needed, 2-3 weeks if not improving..        Chris Medrano M.D., M.S.  Dept. of Orthopaedic Surgery  [facility]  Services        Patient was fitted with a medium f-8 ankle brace. All questions were answered to patient's satisfaction. DME form explained, signed, and copy given to the patient for their records.   Shirley Johnson Clinical Medical Assistant        Again, thank you for allowing me to participate in the care of your patient.        Sincerely,        Chris Medrano MD

## 2020-05-28 NOTE — TELEPHONE ENCOUNTER
LM to call clinic/RN with the date of her Pain Clinic appt @ the [facility].Kpavelrbeth

## 2020-05-29 ENCOUNTER — TELEPHONE (OUTPATIENT)
Dept: FAMILY MEDICINE | Facility: CLINIC | Age: 45
End: 2020-05-29

## 2020-05-29 DIAGNOSIS — G89.4 PAIN SYNDROME, CHRONIC: ICD-10-CM

## 2020-05-29 RX ORDER — PREDNISONE 5 MG/1
TABLET ORAL
Qty: 93 TABLET | Refills: 0 | Status: CANCELLED | OUTPATIENT
Start: 2020-05-29 | End: 2020-08-02

## 2020-05-29 NOTE — TELEPHONE ENCOUNTER
I spoke to the pharmacy and it is on a automated refill. She took it off of there for us so we should not see this request again.    Trang Alatorre RN

## 2020-05-29 NOTE — TELEPHONE ENCOUNTER
Routing refill request to provider for review/approval because:  Drug not on the [facility] refill protocol     Requested Prescriptions   Pending Prescriptions Disp Refills     predniSONE (DELTASONE) 5 MG tablet 93 tablet 0     Sig: Take 2.5 tablets (12.5 mg) by mouth daily for 9 days, THEN 2 tablets (10 mg) daily for 14 days, THEN 1.5 tablets (7.5 mg) daily for 14 days, THEN 1 tablet (5 mg) daily for 14 days, THEN 0.5 tablets (2.5 mg) daily for 14 days.       There is no refill protocol information for this order

## 2020-05-29 NOTE — TELEPHONE ENCOUNTER
Please see refill request from 5/26 - the end result, per KEREN Ramos RN was that patient did not need a refill at this time. Has that changed?    Was this request auto-generated from pharmacy or has pt reconsidered the need for refill.  Please investigate further.  Anna Naidu M.D.

## 2020-06-04 ENCOUNTER — TELEPHONE (OUTPATIENT)
Dept: FAMILY MEDICINE | Facility: CLINIC | Age: 45
End: 2020-06-04

## 2020-06-04 ENCOUNTER — OFFICE VISIT (OUTPATIENT)
Dept: FAMILY MEDICINE | Facility: CLINIC | Age: 45
End: 2020-06-04
Payer: COMMERCIAL

## 2020-06-04 VITALS
HEART RATE: 77 BPM | HEIGHT: 63 IN | SYSTOLIC BLOOD PRESSURE: 118 MMHG | WEIGHT: 248 LBS | BODY MASS INDEX: 43.94 KG/M2 | DIASTOLIC BLOOD PRESSURE: 80 MMHG | OXYGEN SATURATION: 96 % | TEMPERATURE: 98.3 F | RESPIRATION RATE: 18 BRPM

## 2020-06-04 DIAGNOSIS — G89.29 OTHER CHRONIC PAIN: Primary | ICD-10-CM

## 2020-06-04 DIAGNOSIS — E13.9 DIABETES MELLITUS, IATROGENIC (H): Chronic | ICD-10-CM

## 2020-06-04 LAB
ANION GAP SERPL CALCULATED.3IONS-SCNC: 9 MMOL/L (ref 3–14)
BUN SERPL-MCNC: 14 MG/DL (ref 7–30)
CALCIUM SERPL-MCNC: 9.8 MG/DL (ref 8.5–10.1)
CHLORIDE SERPL-SCNC: 104 MMOL/L (ref 94–109)
CHOLEST SERPL-MCNC: 229 MG/DL
CO2 SERPL-SCNC: 23 MMOL/L (ref 20–32)
CREAT SERPL-MCNC: 0.74 MG/DL (ref 0.52–1.04)
CREAT UR-MCNC: 88 MG/DL
GFR SERPL CREATININE-BSD FRML MDRD: >90 ML/MIN/{1.73_M2}
GLUCOSE SERPL-MCNC: 80 MG/DL (ref 70–99)
HBA1C MFR BLD: 6.3 % (ref 0–5.6)
HDLC SERPL-MCNC: 41 MG/DL
LDLC SERPL CALC-MCNC: ABNORMAL MG/DL
LDLC SERPL DIRECT ASSAY-MCNC: 132 MG/DL
MICROALBUMIN UR-MCNC: 12 MG/L
MICROALBUMIN/CREAT UR: 13.17 MG/G CR (ref 0–25)
NONHDLC SERPL-MCNC: 188 MG/DL
POTASSIUM SERPL-SCNC: 3.5 MMOL/L (ref 3.4–5.3)
SODIUM SERPL-SCNC: 136 MMOL/L (ref 133–144)
TRIGL SERPL-MCNC: 454 MG/DL

## 2020-06-04 PROCEDURE — 36415 COLL VENOUS BLD VENIPUNCTURE: CPT | Performed by: FAMILY MEDICINE

## 2020-06-04 PROCEDURE — 80061 LIPID PANEL: CPT | Performed by: FAMILY MEDICINE

## 2020-06-04 PROCEDURE — 83721 ASSAY OF BLOOD LIPOPROTEIN: CPT | Mod: 59 | Performed by: FAMILY MEDICINE

## 2020-06-04 PROCEDURE — 83036 HEMOGLOBIN GLYCOSYLATED A1C: CPT | Performed by: FAMILY MEDICINE

## 2020-06-04 PROCEDURE — 80048 BASIC METABOLIC PNL TOTAL CA: CPT | Performed by: FAMILY MEDICINE

## 2020-06-04 PROCEDURE — 82043 UR ALBUMIN QUANTITATIVE: CPT | Performed by: FAMILY MEDICINE

## 2020-06-04 PROCEDURE — 99214 OFFICE O/P EST MOD 30 MIN: CPT | Performed by: FAMILY MEDICINE

## 2020-06-04 RX ORDER — OXYCODONE HYDROCHLORIDE 5 MG/1
5 TABLET ORAL EVERY 6 HOURS PRN
Qty: 15 TABLET | Refills: 0 | Status: SHIPPED | OUTPATIENT
Start: 2020-06-04 | End: 2020-06-26

## 2020-06-04 RX ORDER — GABAPENTIN 300 MG/1
600 CAPSULE ORAL 4 TIMES DAILY
Qty: 240 CAPSULE | Refills: 0 | Status: SHIPPED | OUTPATIENT
Start: 2020-06-04 | End: 2020-07-01

## 2020-06-04 ASSESSMENT — MIFFLIN-ST. JEOR: SCORE: 1744.05

## 2020-06-04 ASSESSMENT — PAIN SCALES - GENERAL: PAINLEVEL: WORST PAIN (10)

## 2020-06-04 NOTE — TELEPHONE ENCOUNTER
Reason for Call:  pain    Detailed comments: patient is calling in tears and states that she has tapered off of the prednisone and she is in all over body pain. Can barely walk, and does not sleep. Wants to see Evangelista today.    Phone Number Patient can be reached at: Home number on file [phone] (home)    Best Time: any    Can we leave a detailed message on this number? YES   Elisa Mobley  [facility] [place]       Call taken on 6/4/2020 at 8:16 AM by Elisa Zapata

## 2020-06-04 NOTE — TELEPHONE ENCOUNTER
Pt in extreme joint pain after tapering off Prednisone.  Scheduled appt for this afternoon.  KpavelRN

## 2020-06-04 NOTE — PATIENT INSTRUCTIONS
Our Clinic hours are:  Mondays    7:20 am - 7 pm  Tues -  Fri  7:20 am - 5 pm    Clinic Phone: [phone]    The clinic lab opens at 7:30 am Mon - Fri and appointments are required.    [facility]. [phone]  Monday  8 am - 7pm  Tues - Fri 8 am - 5:30 pm

## 2020-06-04 NOTE — PROGRESS NOTES
"Subjective     Doretha Fernandez is a 45 year old female who presents to clinic today for the following health issues:    HPI   Chief Complaint   Patient presents with     Pain     Patient noticed her pain becoming more severe at 2.5 mg prednisone and patient is off medication and is in severe pain rated at a 10/10. Patient needs help with pain. Patient was unable to finish her pain program due to covid-19. Patient describes the pain as \" it feels like my arms and legs are being pulled out of me joints.\" Patient ran out of gabapentin X 2 days ago and is unable to reach her pain. Patient is not eating due to pain. Patient states 'I'm physically ill and my body feels sick.\"   Issue started 10/2017 with a positive LYMPH NODE biopsy showing metastatic melanoma (unkonwn primary site) and then treatment for this caused colitis and ultimately polyarticular arthralgias and arthritis that has been responsive to oral steroids. She was just recently able to wean off of prednisone.  She went through an intensive pain program and is seeing a pain psychologist at [facility].  She has been unable to reach her pain team for a refill of her gabapentin and has been out x 2 days.      Taking 2 tramadol every 4 hours  Taking 600 mg gabapentin 4x/day    Took tramadol at noon and is in \"excruciating pain\" and \"the pain has yet to go away\".  Doesn't feel the tramadol is strong enough.  We discussed that her pain may be worse off of the gabapentin now.      Had a bad experience with Topamax in the past    Had bad side effects with cymbalta- caused nausea.    Pain flared up after her prednisone was discontinued.  Hasn't been off it a full week yet.  When she got below 5 mg is when the pain seemed to spike.  Was told by [place] to not restart as they are seeing her and doing some test tomorrow.        Reviewed and updated as needed this visit by Provider         Review of Systems     Objective    /80   Pulse 77   Temp 98.3  F (36.8  C) " "(Tympanic)   Resp 18   Ht 1.6 m (5' 3\")   Wt 112.5 kg (248 lb)   SpO2 96%   BMI 43.93 kg/m    Body mass index is 43.93 kg/m .  Physical Exam   GENERAL: alert, in significant distress  PSYCH: concentration poor, tearful and crying, at times inconsolably       Results for orders placed or performed in visit on 06/04/20   Hemoglobin A1c     Status: Abnormal   Result Value Ref Range    Hemoglobin A1C 6.3 (H) 0 - 5.6 %               Assessment & Plan       ICD-10-CM    1. Other chronic pain  G89.29 gabapentin (NEURONTIN) 300 MG capsule     oxyCODONE (ROXICODONE) 5 MG tablet   2. Diabetes mellitus, iatrogenic (H)  E13.9 Basic metabolic panel     Hemoglobin A1c     Lipid panel reflex to direct LDL Fasting     Albumin Random Urine Quantitative with Creat Ratio      pain is poorly controlled. Has been out of gabapentin x 2 days, so this was refilled.    Oxycodone for breakthrough pain #15 given and patient was instructed to call her pain clinic and let them know I prescribed this..  I don't know the details of her contract with them. We did discuss that narcotics will not be the long term plan and that minimal use is recommended due to addiction/abuse potential.  I worry about her fragile state of mind, she really feels that there is something  \"wrong in my bones\" and is worried about more systemic illness and that it's being overlooked at this point.      Keep apt at [employer] next week.        No follow-ups on file.    Anna Naidu MD  [facility]      "

## 2020-06-05 NOTE — RESULT ENCOUNTER NOTE
The triglycerides are high. Lowering  the amount of sugar, alcohol and sweets in the diet helps to control this. Exercise and weight loss helps.  With the diabetes, it would be recommended that we have you on a statin (cholesterol lowering medication).  If you're willing to start that, please send me a mycZiptrt message.    There is no protein in the urine which is good.  HgbA1c is down to 6.3% which is also an improvement over the last time.  I expect this will continue to improve off of the prednisone.      Anna Naidu M.D.

## 2020-06-09 ENCOUNTER — TRANSFERRED RECORDS (OUTPATIENT)
Dept: HEALTH INFORMATION MANAGEMENT | Facility: CLINIC | Age: 45
End: 2020-06-09

## 2020-06-09 LAB
ALT SERPL-CCNC: 26 U/L (ref 7–45)
AST SERPL-CCNC: 23 U/L (ref 8–43)
CREAT SERPL-MCNC: 0.85 MG/DL (ref 0.59–1.04)
GFR SERPL CREATININE-BSD FRML MDRD: 84 ML/MIN/BSA
GLUCOSE SERPL-MCNC: 83 MG/DL (ref 70–140)
POTASSIUM SERPL-SCNC: 4.1 MMOL/L (ref 3.6–5.2)
TSH SERPL-ACNC: 2.5 MIU/L (ref 0.3–4.2)

## 2020-06-10 ENCOUNTER — TRANSFERRED RECORDS (OUTPATIENT)
Dept: HEALTH INFORMATION MANAGEMENT | Facility: CLINIC | Age: 45
End: 2020-06-10

## 2020-06-11 DIAGNOSIS — G47.00 PERSISTENT INSOMNIA: ICD-10-CM

## 2020-06-11 RX ORDER — HYDROXYZINE HYDROCHLORIDE 25 MG/1
TABLET, FILM COATED ORAL
Qty: 90 TABLET | Refills: 1 | Status: SHIPPED | OUTPATIENT
Start: 2020-06-11 | End: 2020-12-16

## 2020-06-11 NOTE — TELEPHONE ENCOUNTER
"Requested Prescriptions   Pending Prescriptions Disp Refills     hydrOXYzine (ATARAX) 25 MG tablet [Pharmacy Med Name: hydrOXYzine HCL 25MG TAB] 90 tablet 1     Sig: TAKE ONE TABLET BY MOUTH AT BEDTIME       Antihistamines Protocol Passed - 6/11/2020  5:02 AM        Passed - Recent (12 mo) or future (30 days) visit within the authorizing provider's specialty     Patient has had an office visit with the authorizing provider or a provider within the authorizing providers department within the previous 12 mos or has a future within next 30 days. See \"Patient Info\" tab in inbasket, or \"Choose Columns\" in Meds & Orders section of the refill encounter.              Passed - Patient is age 3 or older     Apply age and/or weight-based dosing for peds patients age 3 and older.    Forward request to provider for patients under the age of 3.          Passed - Medication is active on med list           Prescription approved per [facility] Refill Protocol.    "

## 2020-06-12 ENCOUNTER — TELEPHONE (OUTPATIENT)
Dept: FAMILY MEDICINE | Facility: CLINIC | Age: 45
End: 2020-06-12

## 2020-06-12 DIAGNOSIS — E13.9 DIABETES MELLITUS, IATROGENIC (H): Primary | Chronic | ICD-10-CM

## 2020-06-12 RX ORDER — ROSUVASTATIN CALCIUM 5 MG/1
5 TABLET, COATED ORAL DAILY
Qty: 90 TABLET | Refills: 3 | Status: SHIPPED | OUTPATIENT
Start: 2020-06-12 | End: 2021-04-05

## 2020-06-12 NOTE — TELEPHONE ENCOUNTER
Reason for Call:  Medication request response    Detailed comments: patient got a My Chart from Dr. Naidu about her lab results and question if she is willing to start a Statin. She is willing and uses our pharmacy. Not sure why she didn't My Chart back.    Phone Number Patient can be reached at: Home number on file [phone] (home)    Best Time: any    Can we leave a detailed message on this number? YES   Elisa Mobley  [facility]inic [facility]        Call taken on 6/12/2020 at 11:15 AM by Elisa Zapata

## 2020-06-16 DIAGNOSIS — E55.9 VITAMIN D DEFICIENCY: ICD-10-CM

## 2020-06-16 DIAGNOSIS — F32.0 MILD MAJOR DEPRESSION (H): ICD-10-CM

## 2020-06-16 DIAGNOSIS — F41.1 ANXIETY STATE: ICD-10-CM

## 2020-06-16 RX ORDER — CHOLECALCIFEROL (VITAMIN D3) 25 MCG
TABLET ORAL
Qty: 300 TABLET | Refills: 1 | Status: SHIPPED | OUTPATIENT
Start: 2020-06-16 | End: 2021-01-11

## 2020-06-16 NOTE — TELEPHONE ENCOUNTER
"Requested Prescriptions   Pending Prescriptions Disp Refills     VITAMIN D3 25 MCG (1000 UT) tablet [Pharmacy Med Name: VITAMIN D3 25 MCG(1000 UT) TABS] 300 tablet 0     Sig: TAKE THREE TABLETS BY MOUTH ONCE DAILY       Vitamin Supplements (Adult) Protocol Passed - 6/16/2020  5:02 AM        Passed - High dose Vitamin D not ordered        Passed - Recent (12 mo) or future (30 days) visit within the authorizing provider's specialty     Patient has had an office visit with the authorizing provider or a provider within the authorizing providers department within the previous 12 mos or has a future within next 30 days. See \"Patient Info\" tab in inbasket, or \"Choose Columns\" in Meds & Orders section of the refill encounter.              Passed - Medication is active on med list           Last Written Prescription Date:  3/23/2020  Last Fill Quantity: 300,  # refills: 0   Last office visit: 6/4/2020 with prescribing provider:  Evangelista    Future Office Visit:            "

## 2020-06-17 RX ORDER — VENLAFAXINE HYDROCHLORIDE 225 MG/1
TABLET, EXTENDED RELEASE ORAL
Qty: 90 TABLET | Refills: 0 | Status: SHIPPED | OUTPATIENT
Start: 2020-06-17 | End: 2020-08-07

## 2020-06-25 NOTE — TELEPHONE ENCOUNTER
Patient states that she has tapered off of her steroids after being on them for 2.5 years.  Pain is getting worse.  She has been off the steroids now for over 2 weeks and she states that the pain is unbearable.  Her rheumatologist sent in a prescription for steroids, she started that last night but she states that the pain is getting worse.  She wants to see Dr. Naidu tomorrow.  Advised that Dr. Naidu is out of the office until next week.  Patient is sobbing and agrees to appointment with another provider tomorrow then follow up with Dr. Naidu next Wednesday.  Advised to follow up in ED if pain is severe.    Rupal Fleming RN

## 2020-06-25 NOTE — TELEPHONE ENCOUNTER
Reason for Call:  pain    Detailed comments: patient is calling in tears stating her entire body hurts. AFR Estefani took the call and was sending it to me, but I already had a call and said we would call her back. She is asking to see Dr. Naidu immediately. She mentioned something about a Rheumatologist. Please call patient.    Phone Number Patient can be reached at: Home number on file [phone] (home)    Best Time: any    Can we leave a detailed message on this number? YES   Elisa Mobley  Clinic Station        Call taken on 6/25/2020 at 4:46 PM by Elisa Zapata

## 2020-06-26 ENCOUNTER — TELEPHONE (OUTPATIENT)
Dept: FAMILY MEDICINE | Facility: CLINIC | Age: 45
End: 2020-06-26

## 2020-06-26 ENCOUNTER — OFFICE VISIT (OUTPATIENT)
Dept: FAMILY MEDICINE | Facility: CLINIC | Age: 45
End: 2020-06-26
Payer: COMMERCIAL

## 2020-06-26 VITALS
OXYGEN SATURATION: 98 % | HEART RATE: 76 BPM | TEMPERATURE: 98.2 F | WEIGHT: 246 LBS | RESPIRATION RATE: 20 BRPM | HEIGHT: 63 IN | BODY MASS INDEX: 43.59 KG/M2 | DIASTOLIC BLOOD PRESSURE: 78 MMHG | SYSTOLIC BLOOD PRESSURE: 116 MMHG

## 2020-06-26 DIAGNOSIS — G89.4 PAIN SYNDROME, CHRONIC: ICD-10-CM

## 2020-06-26 DIAGNOSIS — M06.9 RHEUMATOID ARTHRITIS, INVOLVING UNSPECIFIED SITE, UNSPECIFIED RHEUMATOID FACTOR PRESENCE: Primary | ICD-10-CM

## 2020-06-26 PROCEDURE — 99214 OFFICE O/P EST MOD 30 MIN: CPT | Performed by: NURSE PRACTITIONER

## 2020-06-26 RX ORDER — PREDNISONE 5 MG/1
10 TABLET ORAL
COMMUNITY
Start: 2020-04-22 | End: 2021-04-13

## 2020-06-26 RX ORDER — OXYCODONE HYDROCHLORIDE 5 MG/1
5 TABLET ORAL EVERY 8 HOURS PRN
Qty: 60 TABLET | Refills: 0 | Status: SHIPPED | OUTPATIENT
Start: 2020-06-26 | End: 2020-09-23

## 2020-06-26 ASSESSMENT — PAIN SCALES - GENERAL: PAINLEVEL: WORST PAIN (10)

## 2020-06-26 ASSESSMENT — MIFFLIN-ST. JEOR: SCORE: 1734.98

## 2020-06-26 NOTE — PATIENT INSTRUCTIONS
Our Clinic hours are:  Mondays    7:20 am - 7 pm  Tues - Fri  7:20 am - 5 pm    Clinic Phone: [phone]    The clinic lab opens at 7:30 am Mon - Fri and appointments are required.    [facility]. [phone]  Monday  8 am - 7pm  Tues - Fri 8 am - 5:30 pm         Patient Education     Duloxetine delayed-release capsules  Brand Names: Jt Jeffrey  What is this medicine?  DULOXETINE (doo LOX e teen) is used to treat depression, anxiety, and different types of chronic pain.  How should I use this medicine?  Take this medicine by mouth with a glass of water. Follow the directions on the prescription label. Do not cut, crush or chew this medicine. You can take this medicine with or without food. Take your medicine at regular intervals. Do not take your medicine more often than directed. Do not stop taking this medicine suddenly except upon the advice of your doctor. Stopping this medicine too quickly may cause serious side effects or your condition may worsen.  A special MedGuide will be given to you by the pharmacist with each prescription and refill. Be sure to read this information carefully each time.  Talk to your pediatrician regarding the use of this medicine in children. While this drug may be prescribed for children as young as 7 years of age for selected conditions, precautions do apply.  What side effects may I notice from receiving this medicine?  Side effects that you should report to your doctor or health care professional as soon as possible:    allergic reactions like skin rash, itching or hives, swelling of the face, lips, or tongue    anxious    breathing problems    confusion    changes in vision    chest pain    confusion    elevated mood, decreased need for sleep, racing thoughts, impulsive behavior    eye pain    fast, irregular heartbeat    feeling faint or lightheaded, falls    feeling agitated, angry, or irritable    hallucination, loss of contact with  reality    high blood pressure    loss of balance or coordination    palpitations    redness, blistering, peeling or loosening of the skin, including inside the mouth    restlessness, pacing, inability to keep still    seizures    stiff muscles    suicidal thoughts or other mood changes    trouble passing urine or change in the amount of urine    trouble sleeping    unusual bleeding or bruising    unusually weak or tired    vomiting    yellowing of the eyes or skin  Side effects that usually do not require medical attention (report to your doctor or health care professional if they continue or are bothersome):    change in sex drive or performance    change in appetite or weight    constipation    dizziness    dry mouth    headache    increased sweating    nausea    tired  What may interact with this medicine?  Do not take this medicine with any of the following medications:    desvenlafaxine    levomilnacipran    linezolid    MAOIs like Carbex, Eldepryl, Marplan, Nardil, and Parnate    methylene blue (injected into a vein)    milnacipran    thioridazine    venlafaxine  This medicine may also interact with the following medications:    alcohol    amphetamines    aspirin and aspirin-like medicines    certain antibiotics like ciprofloxacin and enoxacin    certain medicines for blood pressure, heart disease, irregular heart beat    certain medicines for depression, anxiety, or psychotic disturbances    certain medicines for migraine headache like almotriptan, eletriptan, frovatriptan, naratriptan, rizatriptan, sumatriptan, zolmitriptan    certain medicines that treat or prevent blood clots like warfarin, enoxaparin, and dalteparin    cimetidine    fentanyl    lithium    NSAIDS, medicines for pain and inflammation, like ibuprofen or naproxen    phentermine    procarbazine    rasagiline    sibutramine    Tania's wort    theophylline    tramadol    tryptophan  What if I miss a dose?  If you miss a dose, take it as soon  as you can. If it is almost time for your next dose, take only that dose. Do not take double or extra doses.  Where should I keep my medicine?  Keep out of the reach of children.  Store at room temperature between 20 and 25 degrees C (68 to 77 degrees F). Throw away any unused medicine after the expiration date.  What should I tell my health care provider before I take this medicine?  They need to know if you have any of these conditions:    bipolar disorder or a family history of bipolar disorder    glaucoma    kidney disease    liver disease    suicidal thoughts or a previous suicide attempt    taken medicines called MAOIs like Carbex, Eldepryl, Marplan, Nardil, and Parnate within 14 days    an unusual reaction to duloxetine, other medicines, foods, dyes, or preservatives    pregnant or trying to get pregnant    breast-feeding  What should I watch for while using this medicine?  Tell your doctor if your symptoms do not get better or if they get worse. Visit your doctor or health care professional for regular checks on your progress. Because it may take several weeks to see the full effects of this medicine, it is important to continue your treatment as prescribed by your doctor.  Patients and their families should watch out for new or worsening thoughts of suicide or depression. Also watch out for sudden changes in feelings such as feeling anxious, agitated, panicky, irritable, hostile, aggressive, impulsive, severely restless, overly excited and hyperactive, or not being able to sleep. If this happens, especially at the beginning of treatment or after a change in dose, call your health care professional.  You may get drowsy or dizzy. Do not drive, use machinery, or do anything that needs mental alertness until you know how this medicine affects you. Do not stand or sit up quickly, especially if you are an older patient. This reduces the risk of dizzy or fainting spells. Alcohol may interfere with the effect of  this medicine. Avoid alcoholic drinks.  This medicine can cause an increase in blood pressure. This medicine can also cause a sudden drop in your blood pressure, which may make you feel faint and increase the chance of a fall. These effects are most common when you first start the medicine or when the dose is increased, or during use of other medicines that can cause a sudden drop in blood pressure. Check with your doctor for instructions on monitoring your blood pressure while taking this medicine.  Your mouth may get dry. Chewing sugarless gum or sucking hard candy, and drinking plenty of water may help. Contact your doctor if the problem does not go away or is severe.  NOTE:This sheet is a summary. It may not cover all possible information. If you have questions about this medicine, talk to your doctor, pharmacist, or health care provider. Copyright  2019 [employer]

## 2020-06-26 NOTE — TELEPHONE ENCOUNTER
Would suggest she have our pharmacy transfer the Rx to [facility]'s, [facility] or [facility] and print out goodrx coupon and pay out of pocket- Looks like it would be between $15-20 for 60 tabs. AMADA Hand CNP

## 2020-06-26 NOTE — TELEPHONE ENCOUNTER
Reason for Call:  Oxycodone    Detailed comments: patient is calling and stating that she had an appt today with VIDYA Borja and Oxycodone was ordered, but now it needs a PA. Patient is crying stating she cannot wait for the approval. Please advise patient. I did tell her it takes a few days. I see the PA in there and was routed to the PA pool.    Phone Number Patient can be reached at: Home number on file [phone] (home)    Best Time: any    Can we leave a detailed message on this number? YES   Elisa Mobley  Clinic Station [place]       Call taken on 6/26/2020 at 2:14 PM by Elisa Zapata

## 2020-06-26 NOTE — TELEPHONE ENCOUNTER
Patient returned call stating the pharmacy will not transfer this rx and it needs to be re-wrritten and sent to [facility] in [place]

## 2020-06-26 NOTE — TELEPHONE ENCOUNTER
Patient advised because of her MA she will not be able to pay cash anywhere she goes. Advised she should go to the ER if she is in that much pain so they can give her something to help her pain. She then stated that [place] from [facility] told her she should be able to get it and that she talked to someone at [facility] this morning. Advised her to call [place] and have her call us. She agreed with this plan.    Trang Alatorre RN

## 2020-06-26 NOTE — PROGRESS NOTES
Subjective     Doretha Fernandez is a 45 year old female who presents to clinic today for the following health issues:    HPI   Chief Complaint   Patient presents with     Generalized Body Aches     Patient has body pain rated at a 10/10 throughout body. Patient was declined care by [facility] due to seeing [facility]. Patient has inflammation in her body and patient spoke to RA MD yesterday and restarted prednisone and is taking 5 mg. Patient is here to discuss pain medication or other options.        Hx of RA- was on prednisone for a few years due to chronic pain and not tolerating other treatments. She was recently weaned off the prednisone (too fast in her opinion) and now has a flare in swelling and pain. Pain is rated 10/10 and she is crying during interview. She is frustrated with her current rheumatologist for having her on the prednisone for long as now she has diabetes, weight gain and this flare of pain. She was put back on a low dose of predisone in the last couple days to help with the pain and she has recently started a new infusion but that is not expected to help with the pain for 3 months. She was refused appointment at [facility] (per her report) because she reestablished care with someone through [facility]. She is wondering if she should have a second opinion of another rheumatologist on her care.    Patient Active Problem List   Diagnosis     CARDIOVASCULAR SCREENING; LDL GOAL LESS THAN 160     DUB (dysfunctional uterine bleeding)     Anxiety state     Esophageal reflux     Mild major depression (H)     Mild intermittent asthma without complication     Vision changes     Prothrombin mutation (H)     Metastatic malignant melanoma (H)     Malignant melanoma of left upper extremity including shoulder (H)     Functional diarrhea     Colitis     Rectal bleeding     Intestinal giardiasis     Bilateral leg cramps     Rash and nonspecific skin eruption     Other chronic pain     Immunosuppressed status  (H)     Ulcerative colitis with complication, unspecified location (H)     Morbid obesity (H)     Cervical high risk HPV (human papillomavirus) test positive     STORMY (obstructive sleep apnea)     Secondary lymphedema     Chronic neutrophilia     Diabetes mellitus, iatrogenic (H)     High risk HPV infection     Abdominal pain     Anemia     Candidiasis of mouth     Hypocalcemia     Malaise     Menstrual irregularity     Arthritis, rheumatoid (H)     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)     Immunosuppression (H)     Pain syndrome, chronic     Drug-induced Cushing's syndrome (H)     Past Surgical History:   Procedure Laterality Date     APPENDECTOMY       COLONOSCOPY N/A 10/18/2017    Procedure: COLONOSCOPY;  Colon;  Surgeon: Debbie Stephens MD;  [facility]     COLONOSCOPY N/A 3/9/2018    Procedure: COMBINED COLONOSCOPY, SINGLE OR MULTIPLE BIOPSY/POLYPECTOMY BY BIOPSY;  colon;  Surgeon: Benita Schumacher MD;  Location: [facility] GI     COLONOSCOPY      multiple since  to present - about 6 total     DISSECT LYMPH NODE AXILLA Left 10/23/2017    Procedure: DISSECT LYMPH NODE AXILLA;  Left Axillary Lymph Node Dissection ;  Surgeon: Laurent Cool MD;  Location: [facility] OR     EXAM UNDER ANESTHESIA PELVIC N/A 2020    Procedure: EXAM UNDER ANESTHESIA, PELVIS; with Cervical Biopsies, Vaginal Biopsy and Endocervical Curettings;  Surgeon: Melina Jung MD;  Location: [facility] OR     GYN SURGERY  ,          REPAIR MOHS Left 2017    Procedure: REPAIR MOHS;  Left Upper Lid Moh's Reconstruction;  Surgeon: Kisha Bosch MD;  Location: [facility] OR       Social History     Tobacco Use     Smoking status: Former Smoker     Packs/day: 1.00     Years: 5.00     Pack years: 5.00     Last attempt to quit: 3/20/1998     Years since quittin.2     Smokeless tobacco: Never Used   Substance Use Topics     Alcohol use: Yes     Comment: occ     Family History   Problem  "Relation Age of Onset     Cancer Mother 45        lung     Neurologic Disorder Mother         epilepsy     Lipids Father      Gastrointestinal Disease Father         diverticulitis      Depression Father      Cancer Maternal Grandmother      Blood Disease Maternal Grandmother         lymphoma      Arthritis Maternal Grandmother      Diabetes Maternal Grandmother      Depression Maternal Grandmother      Macular Degeneration Maternal Grandmother      Glaucoma Maternal Grandmother      Diabetes Maternal Grandfather      Cerebrovascular Disease Maternal Grandfather      Blood Disease Maternal Grandfather      Heart Disease Maternal Grandfather      Glaucoma Maternal Grandfather      Cancer Paternal Grandmother      Cancer - colorectal Paternal Grandmother      Respiratory Paternal Grandfather         emphysema      Heart Disease Daughter      Asthma Daughter      Depression Sister      Melanoma No family hx of            Reviewed and updated as needed this visit by Provider         Review of Systems   Constitutional, HEENT, cardiovascular, pulmonary, gi and gu systems are negative, except as otherwise noted.      Objective    /78   Pulse 76   Temp 98.2  F (36.8  C) (Tympanic)   Resp 20   Ht 1.6 m (5' 3\")   Wt 111.6 kg (246 lb)   SpO2 98%   Breastfeeding No   BMI 43.58 kg/m    Body mass index is 43.58 kg/m .  Physical Exam   GENERAL: alert and mild distress  CV: regular rates and rhythm  MS: bilateral hands and feet with non-pitting edema.  SKIN: no suspicious lesions or rashes  NEURO: Normal strength and tone, mentation intact and speech normal  PSYCH: mentation appears normal, tearful and anxious, appearance well groomed    Diagnostic Test Results:  Labs reviewed in Epic        Assessment & Plan       ICD-10-CM    1. Rheumatoid arthritis, involving unspecified site, unspecified rheumatoid factor presence (H)  M06.9 RHEUMATOLOGY REFERRAL     oxyCODONE (ROXICODONE) 5 MG tablet     RHEUMATOLOGY REFERRAL "   2. Pain syndrome, chronic  G89.4 RHEUMATOLOGY REFERRAL     oxyCODONE (ROXICODONE) 5 MG tablet     RHEUMATOLOGY REFERRAL          CONSULTATION/REFERRAL to Rheumatology    FUTURE APPOINTMENTS:       - Follow-up visit in 5 days with PCP. Will give her a short course of oxycodone for the acute pain. Did discuss possibly weaning Effexor and a trial of Cymbalta but giver her pain and anxiety today will let her discuss that with her PCP.     No follow-ups on file.    AMADA Zaidi [facility]

## 2020-07-01 ENCOUNTER — OFFICE VISIT (OUTPATIENT)
Dept: FAMILY MEDICINE | Facility: CLINIC | Age: 45
End: 2020-07-01
Payer: COMMERCIAL

## 2020-07-01 VITALS
HEIGHT: 63 IN | DIASTOLIC BLOOD PRESSURE: 74 MMHG | BODY MASS INDEX: 43.59 KG/M2 | SYSTOLIC BLOOD PRESSURE: 116 MMHG | OXYGEN SATURATION: 97 % | RESPIRATION RATE: 16 BRPM | HEART RATE: 81 BPM | TEMPERATURE: 97.5 F | WEIGHT: 246 LBS

## 2020-07-01 DIAGNOSIS — G89.29 OTHER CHRONIC PAIN: ICD-10-CM

## 2020-07-01 DIAGNOSIS — C77.3 SECONDARY AND UNSPECIFIED MALIGNANT NEOPLASM OF AXILLA AND UPPER LIMB LYMPH NODES (H): ICD-10-CM

## 2020-07-01 DIAGNOSIS — G89.4 PAIN SYNDROME, CHRONIC: Primary | ICD-10-CM

## 2020-07-01 DIAGNOSIS — M06.9 RHEUMATOID ARTHRITIS, INVOLVING UNSPECIFIED SITE, UNSPECIFIED RHEUMATOID FACTOR PRESENCE: ICD-10-CM

## 2020-07-01 PROCEDURE — 99214 OFFICE O/P EST MOD 30 MIN: CPT | Performed by: FAMILY MEDICINE

## 2020-07-01 RX ORDER — GABAPENTIN 300 MG/1
900 CAPSULE ORAL 4 TIMES DAILY
Qty: 360 CAPSULE | Refills: 5 | Status: SHIPPED | OUTPATIENT
Start: 2020-07-01 | End: 2021-03-16

## 2020-07-01 ASSESSMENT — MIFFLIN-ST. JEOR: SCORE: 1734.98

## 2020-07-01 ASSESSMENT — PAIN SCALES - GENERAL: PAINLEVEL: SEVERE PAIN (7)

## 2020-07-01 NOTE — PROGRESS NOTES
"Subjective     Doretha Fernandez is a 45 year old female who presents to clinic today for the following health issues:    HPI   Chief Complaint   Patient presents with     Pain     Patient is here to discuss a plan for her pain. Patient increased prednisone to 10mg. Patient feels like no one is listening to her and she is getting blown off and tired of hurting.        History:  Issue started 10/2017 with a positive LYMPH NODE biopsy showing metastatic melanoma (unkonwn primary site) and then treatment for this caused colitis and ultimately polyarticular arthralgias and arthritis that has been responsive to oral steroids. She was just recently able to wean off of prednisone.  She went through an intensive pain program and is seeing a pain psychologist at [facility].   Was dismissed from her pain clinic at [facility] because (per patient) she re-established care at [facility].     Had a bad experience with Topamax in the past     Had bad side effects with cymbalta- caused nausea.    Is out of Tramadol.  Has oxycodone - just picked up the #60 yesterday and has only taken one.     Saw rheumatology at [facility] a few weeks ago and was started back on Prednisone 10mg at the direction of Rheumatology at [facility].   Just started Actemra infusions. Sounds like if this is ineffective after 3 months then it would be unlikely that this is an inflammatory/rheumatologic condition causing her pain.     Pain is finally a little better \"I can walk\" but she is still quite tearful in the visit today.  Very frustrated by her pain and the inability to control this pain.  She is wondering what else can be done.      We discussed increasing her gabapentin further to 3600 mg daily (divided out four times daily).  She is also willing to start accupuncture.  She has a health psychologist she continues to see and is doing DBT as well.       Reviewed and updated as needed this visit by Provider         Review of Systems   Constitutional, HEENT, cardiovascular, " "pulmonary, gi and gu systems are negative, except as otherwise noted.      Objective    /74   Pulse 81   Temp 97.5  F (36.4  C) (Tympanic)   Resp 16   Ht 1.6 m (5' 3\")   Wt 111.6 kg (246 lb)   SpO2 97%   BMI 43.58 kg/m    Body mass index is 43.58 kg/m .  Physical Exam   GENERAL APPEARANCE: healthy, alert and moderate distress  PSYCH: mentation appears normal, affect flat and crying    Diagnostic Test Results:  Labs reviewed in Epic  Imaging from [facility] reviewed        Assessment & Plan     1. Pain syndrome, chronic     - PAIN MANAGEMENT REFERRAL    2. Other chronic pain   increase gabapentin  Has oxycodone, will monitor  Use of this.  No plans for \"scheduled\" doses at this time.  I'd like her to use as sparingly as possible.   - gabapentin (NEURONTIN) 300 MG capsule; Take 3 capsules (900 mg) by mouth 4 times daily  Dispense: 360 capsule; Refill: 5    3. Rheumatoid arthritis, involving unspecified site, unspecified rheumatoid factor presence (H)   Actemra infusions recently started    4. 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    Anna Naidu MD  [facility]      "

## 2020-07-01 NOTE — PATIENT INSTRUCTIONS
Gradually increase the Gabapentin to 900 mg four times daily    Acupuncture order is placed    Use the Oxycodone sparingly       Our Clinic hours are:  Mondays    7:20 am - 7 pm  Tues -  Fri  7:20 am - 5 pm    Clinic Phone: [phone]    The clinic lab opens at 7:30 am Mon - Fri and appointments are required.    [facility]  Ph. [phone]  Monday  8 am - 7pm  Tues - Fri 8 am - 5:30 pm

## 2020-07-06 ENCOUNTER — TELEPHONE (OUTPATIENT)
Dept: FAMILY MEDICINE | Facility: CLINIC | Age: 45
End: 2020-07-06

## 2020-07-06 NOTE — TELEPHONE ENCOUNTER
Reason for call:  Patient reporting a symptom    Symptom or request: Pt calling about her AVS from her visit last week with Dr. Naidu.  She noticed on the AVS that her Metformin dose changed and she states that she didn't even talk to Dr. Naidu about diabetes at the visit.  Please call patient and advise.      Duration (how long have symptoms been present): ongoing    Have you been treated for this before? Yes    Additional comments:     Phone Number patient can be reached at:  Home number on file [phone] (home)    Best Time:  any    Can we leave a detailed message on this number:  YES    Call taken on 7/6/2020 at 12:44 PM by Jena Quintero

## 2020-07-06 NOTE — TELEPHONE ENCOUNTER
The patient reports on her AVS from the visit with PCP it reports her Metformin goals.     Medication 1  Notes  My Goal: I will take the following medications  What I need to meet my goal: 1 . Take Metformin  mg take 1 pill with breakfast and 1 pill with  dinner  2. After 7 days then take 2 pills with breakfast and 2 pills with dinner  I plan to meet my goal by this date: 1 months    The patient states she was never told this.  This was states in an diabetic ed visit on 2/7/20.      The patient has been only taking one tablet twice a day.    The patient reports she blood glucose has been   7 day -107  14 day 112  30 day 108.    Please review and advise on the metformin    Thank you    Polly BAJWA RN

## 2020-07-08 NOTE — TELEPHONE ENCOUNTER
Patient states that has only been taking one tablet twice a day of the metformin.  She never did the increase.     She states that her blood sugars are good and wondering if she should stay on the one tablet twice daily?  Or still increase?    BG - 107, 112, 108    Lab Results   Component Value Date    A1C 6.3 06/04/2020    A1C 6.6 01/27/2020         Rupal Fleming RN

## 2020-07-08 NOTE — TELEPHONE ENCOUNTER
Spoke with patient -- she states that her morning numbers are actually 84-94 mg/dL, and rarely has she seen a post meal number >160 mg/dL. Stated that I am ok with her continuing 2 tablets daily (1000 mg total) as her blood sugars reflect that she does not need an increased dose.    Tammie Kapoor RD LD CDE

## 2020-07-09 ENCOUNTER — OFFICE VISIT (OUTPATIENT)
Dept: PALLIATIVE MEDICINE | Facility: CLINIC | Age: 45
End: 2020-07-09
Payer: COMMERCIAL

## 2020-07-09 VITALS
WEIGHT: 245.8 LBS | DIASTOLIC BLOOD PRESSURE: 80 MMHG | BODY MASS INDEX: 43.55 KG/M2 | SYSTOLIC BLOOD PRESSURE: 113 MMHG | HEIGHT: 63 IN | HEART RATE: 100 BPM

## 2020-07-09 DIAGNOSIS — M25.50 POLYARTHRALGIA: Primary | ICD-10-CM

## 2020-07-09 DIAGNOSIS — I01.1 RHEUMATOID AORTITIS: ICD-10-CM

## 2020-07-09 PROCEDURE — 99213 OFFICE O/P EST LOW 20 MIN: CPT | Mod: 25 | Performed by: FAMILY MEDICINE

## 2020-07-09 PROCEDURE — 97810 ACUP 1/> WO ESTIM 1ST 15 MIN: CPT | Performed by: FAMILY MEDICINE

## 2020-07-09 PROCEDURE — 97811 ACUP 1/> W/O ESTIM EA ADD 15: CPT | Performed by: FAMILY MEDICINE

## 2020-07-09 ASSESSMENT — PAIN SCALES - GENERAL: PAINLEVEL: SEVERE PAIN (7)

## 2020-07-09 ASSESSMENT — MIFFLIN-ST. JEOR: SCORE: 1734.07

## 2020-07-09 NOTE — PROGRESS NOTES
S: 45-year-old female past medical history is significant for anxiety, GERD, depression, asthma, dysfunctional uterine bleeding, history of metastatic malignant melanoma, colitis, chronic pain, morbid obesity, obstructive sleep apnea, secondary lymphedema, chronic neutrophilia, type 2 diabetes, rheumatoid arthritis, chronic pain syndrome, drug-induced Cushing's syndrome, CVA, tension headaches, presents to acupuncture clinic for consideration of acupuncture to address concerns of polyarthralgia progressive over the last 5 to 10 years especially.  She describes pain in shoulders and also a disjointed feeling like you are going to pop out on her, similar symptoms in her bilateral hips, also occasionally bilateral elbow pain, wrist pain, knee pain, ankle pain.  She is also affected in the small joints of the hands by her description and her rheumatoid distribution.  She has had immunosuppressive type therapy in the past and blames it for her weight gain and inflammation involving her colon.  The patient states that she has had evaluation by rheumatology and is currently medicated with gabapentin, prednisone, oxycodone (the patient states emphatically that she tries to minimize her oxycodone use).    Active Ambulatory Problems     Diagnosis Date Noted     CARDIOVASCULAR SCREENING; LDL GOAL LESS THAN 160 07/28/2011     DUB (dysfunctional uterine bleeding) 07/28/2011     Anxiety state 09/13/2012     Esophageal reflux 09/13/2012     Mild major depression (H) 06/24/2013     Mild intermittent asthma without complication 11/08/2013     Vision changes 04/01/2017     Prothrombin mutation (H) 04/05/2017     Metastatic malignant melanoma (H) 10/10/2017     Malignant melanoma of left upper extremity including shoulder (H) 10/12/2017     Functional diarrhea 02/22/2018     Colitis 03/01/2018     Rectal bleeding 03/04/2018     Intestinal giardiasis 03/05/2018     Bilateral leg cramps 03/07/2018     Rash and nonspecific skin eruption  04/05/2018     Other chronic pain 05/06/2018     Immunosuppressed status (H) 09/05/2019     Ulcerative colitis with complication, unspecified location (H) 09/05/2019     Morbid obesity (H) 09/05/2019     Cervical high risk HPV (human papillomavirus) test positive 12/13/2019     STORMY (obstructive sleep apnea) 01/27/2020     Secondary lymphedema 01/27/2020     Chronic neutrophilia 01/27/2020     Diabetes mellitus, iatrogenic (H) 01/28/2020     High risk HPV infection 01/28/2020     Abdominal pain 05/10/2018     Anemia 05/14/2018     Candidiasis of mouth 05/10/2018     Hypocalcemia 05/15/2018     Malaise 05/10/2018     Menstrual irregularity 05/14/2018     Arthritis, rheumatoid (H) 11/26/2018     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/07/2017     Immunosuppression (H) 01/28/2020     Pain syndrome, chronic 02/12/2020     Drug-induced Cushing's syndrome (H) 02/12/2020     Resolved Ambulatory Problems     Diagnosis Date Noted     Cerebral infarction (H) 06/19/2015     Acute non intractable tension-type headache 04/01/2017     Melanoma (H) 10/23/2017     Sepsis due to cellulitis (H) 11/14/2017     Diarrhea 03/04/2018     Influenza-like illness 05/06/2018     Dysphagia 01/28/2019     Knee pain 05/12/2018     Past Medical History:   Diagnosis Date     Abnormal MRI      Anxiety      Basal cell carcinoma      Depression      Inflammatory arthritis      Insomnia      Lumbago      Lymphedema      Malignant melanoma (H)      Mild persistent asthma      Obesity      Prothrombin deficiency (H)      Stroke (cerebrum) (H) 2004     TIA (transient ischemic attack) 2004     Type 2 diabetes mellitus (H)      Past Surgical History:   Procedure Laterality Date     APPENDECTOMY  2004     COLONOSCOPY N/A 10/18/2017    Procedure: COLONOSCOPY;  Colon;  Surgeon: Debbie Stephens MD;  [facility]     COLONOSCOPY N/A 3/9/2018    Procedure: COMBINED COLONOSCOPY, SINGLE OR MULTIPLE  BIOPSY/POLYPECTOMY BY BIOPSY;  colon;  Surgeon: Benita Schumacher MD;  [facility]     COLONOSCOPY      multiple since 2018 to present - about 6 total     DISSECT LYMPH NODE AXILLA Left 10/23/2017    Procedure: DISSECT LYMPH NODE AXILLA;  Left Axillary Lymph Node Dissection ;  Surgeon: Laurent Cool MD;  [facility]     EXAM UNDER ANESTHESIA PELVIC N/A 2020    Procedure: EXAM UNDER ANESTHESIA, PELVIS; with Cervical Biopsies, Vaginal Biopsy and Endocervical Curettings;  Surgeon: Melina Jung MD;  Location: [facility]     GYN SURGERY  ,          REPAIR MOHS Left 2017    Procedure: REPAIR MOHS;  Left Upper Lid Moh's Reconstruction;  Surgeon: Kisha Bosch MD;  Location:  OR     Social History     Socioeconomic History     Marital status:      Spouse name: Not on file     Number of children: Not on file     Years of education: Not on file     Highest education level: Not on file   Occupational History     Not on file   Social Needs     Financial resource strain: Not on file     Food insecurity     Worry: Not on file     Inability: Not on file     Transportation needs     Medical: Not on file     Non-medical: Not on file   Tobacco Use     Smoking status: Former Smoker     Packs/day: 1.00     Years: 5.00     Pack years: 5.00     Last attempt to quit: 3/20/1998     Years since quittin.3     Smokeless tobacco: Never Used   Substance and Sexual Activity     Alcohol use: Yes     Comment: occ     Drug use: No     Sexual activity: Not Currently     Partners: Male     Birth control/protection: Surgical   Lifestyle     Physical activity     Days per week: Not on file     Minutes per session: Not on file     Stress: Not on file   Relationships     Social connections     Talks on phone: Not on file     Gets together: Not on file     Attends [employer] service: Not on file     Active member of club or organization: Not on file     Attends meetings of clubs or organizations: Not on  file     Relationship status: Not on file     Intimate partner violence     Fear of current or ex partner: Not on file     Emotionally abused: Not on file     Physically abused: Not on file     Forced sexual activity: Not on file   Other Topics Concern     Parent/sibling w/ CABG, MI or angioplasty before 65F 55M? No   Social History Narrative    20 y.o- patient's mother   of lung cancer. She had to take care of her younger sister.    2012- patient's  had a heart attack with stents placed, followed by cardiac rehabilitation    2000 TO 2012  was in [facility] for depression    2013 patient's  went through alcohol rehabilitation at [facility]            They have attended couple counseling a couple of times and patient went to the family program for chemical dependency.    Patient denies alcohol or drug use and herself            Has 2 children, girls ages 10 and 13     For a while she was working 3 jobs since her  was ill. 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 Reports her job is very stressful.         Family History   Problem Relation Age of Onset     Cancer Mother 45        lung     Neurologic Disorder Mother         epilepsy     Lipids Father      Gastrointestinal Disease Father         diverticulitis      Depression Father      Cancer Maternal Grandmother      Blood Disease Maternal Grandmother         lymphoma      Arthritis Maternal Grandmother      Diabetes Maternal Grandmother      Depression Maternal Grandmother      Macular Degeneration Maternal Grandmother      Glaucoma Maternal Grandmother      Diabetes Maternal Grandfather      Cerebrovascular Disease Maternal Grandfather      Blood Disease Maternal Grandfather      Heart Disease Maternal Grandfather      Glaucoma Maternal Grandfather      Cancer Paternal Grandmother      Cancer - colorectal Paternal Grandmother      Respiratory Paternal  Grandfather         emphysema      Heart Disease Daughter      Asthma Daughter      Depression Sister      Melanoma No family hx of      Current Outpatient Medications   Medication     Acetaminophen (TYLENOL PO)     blood glucose (ACCU-CHEK GUIDE) test strip     blood glucose monitoring (ACCU-CHEK FASTCLIX) lancets     Calcium Carb-Cholecalciferol 600-800 MG-UNIT TABS     Calcium Carbonate (CALCIUM-CARB 600 PO)     ferrous fumarate 65 mg, [place]. FE,-Vitamin C 125 mg (VITRON-C)  MG TABS tablet     gabapentin (NEURONTIN) 300 MG capsule     GOODSENSE ARTHRITIS PAIN 650 MG CR tablet     hydrOXYzine (ATARAX) 25 MG tablet     metFORMIN (GLUCOPHAGE-XR) 500 MG 24 hr tablet     NEW MED     ondansetron (ZOFRAN) 8 MG tablet     oxyCODONE (ROXICODONE) 5 MG tablet     predniSONE (DELTASONE) 5 MG tablet     rosuvastatin (CRESTOR) 5 MG tablet     venlafaxine (EFFEXOR-ER) 225 MG 24 hr tablet     VITAMIN D3 25 MCG (1000 UT) tablet     No current facility-administered medications for this visit.      Facility-Administered Medications Ordered in Other Visits   Medication     lidocaine 1 % 9 mL     sodium bicarbonate 8.4 % injection 1 mEq        Allergies   Allergen Reactions     Bee Venom Swelling     Azithromycin Diarrhea     Erythromycin      Other reaction(s): GI intolerance, Vomiting     Fentanyl Other (See Comments)     sweating  sweating     Prochlorperazine Fatigue     Other reaction(s): Other (see comments)  Fatigue     Buspirone      Other reaction(s): GI intolerance  vomiting     Erythrocin Nausea and Vomiting     Zithromax [Azithromycin Dihydrate] Diarrhea     Enbrel [Etanercept] Hives and Rash     Review Of Systems  Skin: negative  Eyes: negative  Ears/Nose/Throat: negative  Respiratory: No shortness of breath, dyspnea on exertion, cough, or hemoptysis  Cardiovascular: negative  Gastrointestinal: negative  Genitourinary: negative  Musculoskeletal: negative, back pain, neck pain, arthritis, joint pain, joint swelling  and joint stiffness  Neurologic: negative, migraine headaches and headaches  Psychiatric: negative, excessive stress, sleep disturbance, feeling anxious, anxiety and depression  Hematologic/Lymphatic/Immunologic: negative  Endocrine: negative    O:  Exam:  Constitutional: healthy, alert, no distress and over weight  Head: Normocephalic. No masses, lesions, tenderness or abnormalities  Neck: Neck supple. No adenopathy. Thyroid symmetric, normal size,, Carotids without bruits.  ENT: ENT exam normal, no neck nodes or sinus tenderness  Cardiovascular: negative, PMI normal. No lifts, heaves, or thrills. RRR. No murmurs, clicks gallops or rub  Respiratory: negative, Percussion normal. Good diaphragmatic excursion. Lungs clear  Gastrointestinal: Abdomen soft, non-tender. BS normal. No masses, organomegaly  : Deferred  Musculoskeletal: extremities normal-normal range of motion cervical spine actively.  Bilateral shoulders are restricted significantly due to pain especially the left one with internal rotation.  Normal range of motion at elbows, decreased range of motion at the wrist to flexion extension normal with pronation and supination.  Hip range of motion actively is restricted due to pain predominantly on the left side.  There is limitation on internal or external rotation especially on the left side due to pain.  Knee range of motion is within normal limits.  No significant findings on remainder of the knee exam.  Bilateral ankle pain with active range of motion.    Skin: no suspicious lesions or rashes  Neurologic: Gait normal. Reflexes normal and symmetric. Sensation grossly WNL.    A: Polyarthralgia.  Rheumatoid arthritis  P: Acupuncture risks and benefits as well as realistic expectations were discussed with the patient and she would like to proceed today.  First treatment is today.  Given the chronicity of her current concerns I would recommend seeing her for acupuncture treatment once weekly x4 weeks and  assess for response.  The patient was in agreement with the plan.      Pre-Procedure:  Patient's Name and Date of Birth verified:  YES  Verified By:  zonia   (initials)  Diagnosis:  Data Unavailable  Interval History:  Na    Complications and/or Adverse Effects of Last Acupuncture Treatment:   na   Site Marking and Verification:  Verification of site selection (based on symptoms and condition:  YES  Verified by: zonia (initials)  Patient identification verified by provider: YES  Verified by: charlinec (initials)  Pause for the Cause:  YES  Verified by: charlinec (initials)   Procedure Note:  Acupuncture Treatment Number: 1  Acupuncture Points Selected: BODY:Four Spangler, SP6 AUR:SM,PZ,omega 3    Electrical Stim: No  Frequency    HZ    Number of needles:  12    Re-insertion/Manipulation of needles after initial 15 minutes: YES  Time:  30 Minutes   Post-Procedure:  Complication/Adverse Effects: 0      Management:  0      Signature:  Ismael Serrano MD

## 2020-07-09 NOTE — NURSING NOTE
"Pain Clinic Visit    Chief Complaint   Patient presents with     Acupuncture     Legs, feet, arms, hands, wrists, achilles area - whole body       Injury: whole body pain, chronic pain due to immunotherapy    Location of Pain: worst today is legs, arms, achilles area, hands, feet, wrist  Duration of Pain: 2 years  Rating of Pain: 7  Pain is better with: nothing - prednisone but trying to get off, at 10 mg  Pain is worse with: she wakes up in pain and goes to bed in pain. If she sits for a long time it is worse. In the am it seems to be worse. If she relaxes her body for a period of time pain becomes worse.   Treatment so far consists of: prednisone, RA doctor at [place], [facility] med - actmira, embrol, humira, chronic pain program.   Ever received acupuncture before? no        Initial /80   Pulse 100   Ht 1.6 m (5' 3\")   Wt 111.5 kg (245 lb 12.8 oz)   BMI 43.54 kg/m   Estimated body mass index is 43.54 kg/m  as calculated from the following:    Height as of this encounter: 1.6 m (5' 3\").    Weight as of this encounter: 111.5 kg (245 lb 12.8 oz).  Medication Reconciliation: complete    Shirley Johnson Certified Medical Assistant     "

## 2020-07-15 ENCOUNTER — TELEPHONE (OUTPATIENT)
Dept: DERMATOLOGY | Facility: CLINIC | Age: 45
End: 2020-07-15

## 2020-07-15 NOTE — TELEPHONE ENCOUNTER
"Reason for Call:  Other appointment    Detailed comments: Pt wondering if she can be seen sooner then 8-5?  Has a spot on her arm that keeps getting infected and getting bigger \"just not right\"  Close to the site where she had her lymph node removed - states she had bx on her eye in the past - having same reaction on her arm.  States her arm is swelling around it.  States she is kind of paranoid.       Phone Number Patient can be reached at: Home number on file [phone] (home)    Best Time:     Can we leave a detailed message on this number? YES    Call taken on 7/15/2020 at 1:37 PM by Lissett Gates      "

## 2020-07-15 NOTE — TELEPHONE ENCOUNTER
Pt has not been seen in the past here for dermatology issue.  If she believes the area on her arm looks infected, I would suggest that she be seen in Family Practice for further evaluation and maybe get started on an antibiotic while waiting for her appt with derm on 08-05-20.    I left message for pt to return my call.    Frannie Ortiz  [facility] RN

## 2020-07-16 ENCOUNTER — OFFICE VISIT (OUTPATIENT)
Dept: PALLIATIVE MEDICINE | Facility: CLINIC | Age: 45
End: 2020-07-16
Payer: COMMERCIAL

## 2020-07-16 VITALS
BODY MASS INDEX: 43.41 KG/M2 | HEIGHT: 63 IN | WEIGHT: 245 LBS | DIASTOLIC BLOOD PRESSURE: 71 MMHG | HEART RATE: 82 BPM | SYSTOLIC BLOOD PRESSURE: 111 MMHG

## 2020-07-16 DIAGNOSIS — M25.50 POLYARTHRALGIA: Primary | ICD-10-CM

## 2020-07-16 PROCEDURE — 97811 ACUP 1/> W/O ESTIM EA ADD 15: CPT | Performed by: FAMILY MEDICINE

## 2020-07-16 PROCEDURE — 97810 ACUP 1/> WO ESTIM 1ST 15 MIN: CPT | Performed by: FAMILY MEDICINE

## 2020-07-16 ASSESSMENT — MIFFLIN-ST. JEOR: SCORE: 1730.44

## 2020-07-16 ASSESSMENT — PAIN SCALES - GENERAL: PAINLEVEL: SEVERE PAIN (6)

## 2020-07-16 NOTE — NURSING NOTE
"Pain Clinic Visit    Chief Complaint   Patient presents with     Acupuncture     Whole body pain       After the last acupuncture session patient states: things have remained the same. She notes that she did feel a little looser after tx. No change during the week.    Rating of Pain: Severe Pain (6)    Shirley Johnson Clinical Medical Assistant       Initial /71   Pulse 82   Ht 1.6 m (5' 3\")   Wt 111.1 kg (245 lb)   BMI 43.40 kg/m   Estimated body mass index is 43.4 kg/m  as calculated from the following:    Height as of this encounter: 1.6 m (5' 3\").    Weight as of this encounter: 111.1 kg (245 lb).  Medication Reconciliation: complete    "

## 2020-07-16 NOTE — PROGRESS NOTES
S: She felt slightly looser for approximately 24-48 hours after the initial treatment 1 week ago.  No real change in pain.  Better sleep.  O:Alert NAD  A: Polyarthralgia  P:Acu per note.  Pre-Procedure:  Patient's Name and Date of Birth verified:  YES  Verified By:  zonia (initials)  Diagnosis:  Data Unavailable  Interval History:  1w  Complications and/or Adverse Effects of Last Acupuncture Treatment: 0   Site Marking and Verification:  Verification of site selection (based on symptoms and condition:  YES  Verified by: zonia (initials)  Patient identification verified by provider: YES  Verified by: zonia (initials)  Pause for the Cause:  YES  Verified by: zonia (initials)   Procedure Note:  Acupuncture Treatment Number: 2  Acupuncture Points Selected: Four Spangler,SP6, ST36, AUR:ASP1-4    Electrical Stim: No  Frequency 0   HZ    Number of needles:  16    Re-insertion/Manipulation of needles after initial 15 minutes: YES  Time:  30 Minutes   Post-Procedure:  Complication/Adverse Effects: 0      Management:  0      Signature:  Ismael Serrano MD

## 2020-07-23 ENCOUNTER — OFFICE VISIT (OUTPATIENT)
Dept: PALLIATIVE MEDICINE | Facility: CLINIC | Age: 45
End: 2020-07-23
Payer: COMMERCIAL

## 2020-07-23 VITALS — DIASTOLIC BLOOD PRESSURE: 82 MMHG | RESPIRATION RATE: 20 BRPM | SYSTOLIC BLOOD PRESSURE: 116 MMHG | HEART RATE: 76 BPM

## 2020-07-23 DIAGNOSIS — M25.50 POLYARTHRALGIA: Primary | ICD-10-CM

## 2020-07-23 PROCEDURE — 97810 ACUP 1/> WO ESTIM 1ST 15 MIN: CPT | Performed by: FAMILY MEDICINE

## 2020-07-23 PROCEDURE — 97811 ACUP 1/> W/O ESTIM EA ADD 15: CPT | Performed by: FAMILY MEDICINE

## 2020-07-23 NOTE — PROGRESS NOTES
"Initial /82 (BP Location: Right arm, Patient Position: Sitting, Cuff Size: Adult Large)   Pulse 76   Resp 20  Estimated body mass index is 43.4 kg/m  as calculated from the following:    Height as of 7/16/20: 1.6 m (5' 3\").    Weight as of 7/16/20: 111.1 kg (245 lb). .    Sanaz SHELBY RN   [facility]          "

## 2020-07-23 NOTE — PROGRESS NOTES
S: Feels slightly looser and mildly decreased pain since last treatment lasting about 3 to 4 days.  O:alert NAD  A: Polyarthralgia  P:Acu per note.  Pre-Procedure:  Patient's Name and Date of Birth verified:  YES  Verified By:  zonia (initials)  Diagnosis:  Data Unavailable  Interval History:  1w  Complications and/or Adverse Effects of Last Acupuncture Treatment: 0   Site Marking and Verification:  Verification of site selection (based on symptoms and condition:  YES  Verified by: zonia (initials)  Patient identification verified by provider: YES  Verified by: zonia (initials)  Pause for the Cause:  YES  Verified by: zonia (initials)   Procedure Note:  Acupuncture Treatment Number: 3  Acupuncture Points Selected: Four Spangler, SP6, ST36 AUR:ASP 1-4    Electrical Stim: No  Frequency 0   HZ    Number of needles:  16    Re-insertion/Manipulation of needles after initial 15 minutes: YES  Time:  30   Minutes   Post-Procedure:  Complication/Adverse Effects: 0      Management:  0      Signature:  Ismael Serrano MD

## 2020-08-05 ENCOUNTER — OFFICE VISIT (OUTPATIENT)
Dept: DERMATOLOGY | Facility: CLINIC | Age: 45
End: 2020-08-05
Payer: COMMERCIAL

## 2020-08-05 VITALS — DIASTOLIC BLOOD PRESSURE: 82 MMHG | HEART RATE: 96 BPM | SYSTOLIC BLOOD PRESSURE: 136 MMHG | OXYGEN SATURATION: 99 %

## 2020-08-05 DIAGNOSIS — L82.1 SEBORRHEIC KERATOSIS: ICD-10-CM

## 2020-08-05 DIAGNOSIS — L82.0 INFLAMED SEBORRHEIC KERATOSIS: ICD-10-CM

## 2020-08-05 DIAGNOSIS — L81.4 LENTIGO: ICD-10-CM

## 2020-08-05 DIAGNOSIS — Z85.820 HISTORY OF MELANOMA: ICD-10-CM

## 2020-08-05 DIAGNOSIS — D18.01 ANGIOMA OF SKIN: ICD-10-CM

## 2020-08-05 DIAGNOSIS — Z85.828 HISTORY OF SKIN CANCER: Primary | ICD-10-CM

## 2020-08-05 DIAGNOSIS — D23.9 DERMAL NEVUS: ICD-10-CM

## 2020-08-05 PROCEDURE — 17110 DESTRUCTION B9 LES UP TO 14: CPT | Performed by: DERMATOLOGY

## 2020-08-05 PROCEDURE — 99213 OFFICE O/P EST LOW 20 MIN: CPT | Mod: 25 | Performed by: DERMATOLOGY

## 2020-08-05 NOTE — PROGRESS NOTES
Doretha Fernandez is a 45 year old year old female patient here today for f/u h xof melanoma unknown primary.  She went to Dr. Cool for node dissection.1 out of 20 nodes positive.  No adjuvant Radiation.  of brain and PET scan both clear.  Was on  Nivolumab got 8 injection but got colitis.  She went to [place].   She was treated with high dose steroids for colitis and had a dx of bacterial pneumonia.   She was admitted for to hospital for this. She is followed by GI She is off of melanoma meds.  She was also dx with seronegative rheumatoid arthritis and was on humira which is not helping,  She is going to pain clinic now.  Scans havebeen clear.  Oncology thinks she is clear.  She notes spot on left arm itching.   Patient reports the following modifying factors none.  Associated symptoms: none.  Patient has no other skin complaints today.  Remainder of the HPI, Meds, PMH, Allergies, FH, and SH was reviewed in chart. Current PET-CT scan, physical exam and laboratory tests demonstrate no evidence of melanoma recurrence.   ON prednisone and getting infusions for inflammatory arthritis.     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      Anxiety      Basal cell carcinoma      Cervical high risk HPV (human papillomavirus) test positive 2019    See problem list     Colitis      Depression      Diabetes mellitus, iatrogenic (H) 2020     Inflammatory arthritis      Insomnia      Lumbago     left lower back pain     Lymphedema      Malignant melanoma (H)      Melanoma (H) 10/23/2017     Mild persistent asthma      Obesity      STORMY (obstructive sleep apnea)      Prothrombin deficiency (H)     takes 81mg asa daily     Stroke (cerebrum) (H)     During      TIA (transient ischemic attack)      Type 2 diabetes mellitus (H)        Past Surgical History:   Procedure Laterality Date     APPENDECTOMY  2004     COLONOSCOPY N/A 10/18/2017     Procedure: COLONOSCOPY;  Colon;  Surgeon: Debbie Stephens MD;  Location: [facility]     COLONOSCOPY N/A 3/9/2018    Procedure: COMBINED COLONOSCOPY, SINGLE OR MULTIPLE BIOPSY/POLYPECTOMY BY BIOPSY;  colon;  Surgeon: Benita Schumacher MD;  Location: [facility]     COLONOSCOPY      multiple since  to present - about 6 total     DISSECT LYMPH NODE AXILLA Left 10/23/2017    Procedure: DISSECT LYMPH NODE AXILLA;  Left Axillary Lymph Node Dissection ;  Surgeon: Laurent Cool MD;  Location: [facility] OR     EXAM UNDER ANESTHESIA PELVIC N/A 2020    Procedure: EXAM UNDER ANESTHESIA, PELVIS; with Cervical Biopsies, Vaginal Biopsy and Endocervical Curettings;  Surgeon: Melina Jung MD;  Location: [facility] OR     GYN SURGERY  ,          REPAIR MOHS Left 2017    Procedure: REPAIR MOHS;  Left Upper Lid Moh's Reconstruction;  Surgeon: Kisha Bosch MD;  Location: [facility]        Family History   Problem Relation Age of Onset     Cancer Mother 45        lung     Neurologic Disorder Mother         epilepsy     Lipids Father      Gastrointestinal Disease Father         diverticulitis      Depression Father      Cancer Maternal Grandmother      Blood Disease Maternal Grandmother         lymphoma      Arthritis Maternal Grandmother      Diabetes Maternal Grandmother      Depression Maternal Grandmother      Macular Degeneration Maternal Grandmother      Glaucoma Maternal Grandmother      Diabetes Maternal Grandfather      Cerebrovascular Disease Maternal Grandfather      Blood Disease Maternal Grandfather      Heart Disease Maternal Grandfather      Glaucoma Maternal Grandfather      Cancer Paternal Grandmother      Cancer - colorectal Paternal Grandmother      Respiratory Paternal Grandfather         emphysema      Heart Disease Daughter      Asthma Daughter      Depression Sister      Melanoma No family hx of        Social History     Socioeconomic History     Marital status:      Spouse name: Not on file      Number of children: Not on file     Years of education: Not on file     Highest education level: Not on file   Occupational History     Not on file   Social Needs     Financial resource strain: Not on file     Food insecurity     Worry: Not on file     Inability: Not on file     Transportation needs     Medical: Not on file     Non-medical: Not on file   Tobacco Use     Smoking status: Former Smoker     Packs/day: 1.00     Years: 5.00     Pack years: 5.00     Last attempt to quit: 3/20/1998     Years since quittin.3     Smokeless tobacco: Never Used   Substance and Sexual Activity     Alcohol use: Yes     Comment: occ     Drug use: No     Sexual activity: Not Currently     Partners: Male     Birth control/protection: Surgical   Lifestyle     Physical activity     Days per week: Not on file     Minutes per session: Not on file     Stress: Not on file   Relationships     Social connections     Talks on phone: Not on file     Gets together: Not on file     Attends Synagogue service: Not on file     Active member of club or organization: Not on file     Attends meetings of clubs or organizations: Not on file     Relationship status: Not on file     Intimate partner violence     Fear of current or ex partner: Not on file     Emotionally abused: Not on file     Physically abused: Not on file     Forced sexual activity: Not on file   Other Topics Concern     Parent/sibling w/ CABG, MI or angioplasty before 65F 55M? No   Social History Narrative    20 y.o- patient's mother   of lung cancer. She had to take care of her younger sister.    2012- patient's  had a heart attack with stents placed, followed by cardiac rehabilitation    2000 TO 2012  was in [facility] for depression    2013 patient's  went through alcohol rehabilitation at [facility]            They have attended couple counseling a couple of times and patient went to the  family program for chemical dependency.    Patient denies alcohol or drug use and herself            Has 2 children, girls ages 10 and 13     For a while she was working 3 jobs since her  was ill. Works at the [employer] for [employer]. Reports her job is very stressful.           Outpatient Encounter Medications as of 8/5/2020   Medication Sig Dispense Refill     Acetaminophen (TYLENOL PO) Take 1,000 mg by mouth every 8 hours as needed for mild pain or fever (Pt last dose 02/10/20)        blood glucose (ACCU-CHEK GUIDE) test strip 1 strip by In Vitro route 2 times daily Use to test blood sugar 2 times daily or as directed. 200 strip 11     blood glucose monitoring (ACCU-CHEK FASTCLIX) lancets 1 each by In Vitro route 2 times daily Use to test blood sugar 2 times daily or as directed.  Ok to substitute alternative if insurance prefers. 102 each 11     Calcium Carb-Cholecalciferol 600-800 MG-UNIT TABS Take 800 mg by mouth daily before breakfast       Calcium Carbonate (CALCIUM-CARB 600 PO) Take 1 tablet by mouth as needed (Pt last took 1 week ago 2/10/20)        ferrous fumarate 65 mg, Saxman. FE,-Vitamin C 125 mg (VITRON-C)  MG TABS tablet Take 1 tablet by mouth daily 30 tablet 3     gabapentin (NEURONTIN) 300 MG capsule Take 3 capsules (900 mg) by mouth 4 times daily 360 capsule 5     GOODSENSE ARTHRITIS PAIN 650 MG CR tablet        hydrOXYzine (ATARAX) 25 MG tablet TAKE ONE TABLET BY MOUTH AT BEDTIME 90 tablet 1     metFORMIN (GLUCOPHAGE-XR) 500 MG 24 hr tablet Take 2 tablets (1,000 mg) by mouth daily (with dinner) (Patient taking differently: Take 500 mg by mouth 2 times daily Transitioned to this schedule 1 week ago 2/10/20) 360 tablet 1     NEW MED actmera infusions monthy       ondansetron (ZOFRAN) 8 MG tablet Take 8 mg by mouth every 8 hours as needed for nausea (Pt has not taken in past year 2/10/20)        oxyCODONE (ROXICODONE) 5 MG tablet Take 1 tablet (5 mg) by mouth every 8 hours  as needed for pain Max 3 tabs per day. 60 tablet 0     predniSONE (DELTASONE) 5 MG tablet Take 10 mg by mouth       rosuvastatin (CRESTOR) 5 MG tablet Take 1 tablet (5 mg) by mouth daily 90 tablet 3     venlafaxine (EFFEXOR-ER) 225 MG 24 hr tablet TAKE ONE TABLET BY MOUTH ONCE DAILY 90 tablet 0     VITAMIN D3 25 MCG (1000 UT) tablet TAKE THREE TABLETS BY MOUTH ONCE DAILY 300 tablet 1     Facility-Administered Encounter Medications as of 8/5/2020   Medication Dose Route Frequency Provider Last Rate Last Dose     lidocaine 1 % 9 mL  9 mL Intradermal Once Anna Naidu MD         sodium bicarbonate 8.4 % injection 1 mEq  1 mEq Intradermal Once Anna Naidu MD                 Review Of Systems  Skin: As above  Eyes: negative  Ears/Nose/Throat: negative  Respiratory: No shortness of breath, dyspnea on exertion, cough, or hemoptysis  Cardiovascular: negative  Gastrointestinal: negative  Genitourinary: negative  Musculoskeletal: negative  Neurologic: negative  Psychiatric: negative  Hematologic/Lymphatic/Immunologic: negative  Endocrine: negative      O:   NAD, WDWN, Alert & Oriented, Mood & Affect wnl, Vitals stable   Here today alone   There were no vitals taken for this visit.   General appearance normal   Vitals stable   Alert, oriented and in no acute distress      Following lymph nodes palpated: Occipital, Cervical, Supraclavicular , axillary, inguinal, femoralno lad     Pink papules on trunk  L arm inflamed seborrheic keratosis   Stuck on papules and brown macules on trunk and ext   Red papules on trunk  Flesh colored papules on trunk     The remainder of the full exam was normal; the following areas were examined:  conjunctiva/lids, oral mucosa, neck, peripheral vascular system, abdomen, lymph nodes, digits/nails, eccrine and apocrine glands, scalp/hair, face, neck, chest, abdomen, buttocks, back, RUE, LUE, RLE, LLE       Eyes: Conjunctivae/lids:Normal     ENT: Lips, buccal mucosa, tongue:  normal    MSK:Normal    Cardiovascular: peripheral edema none    Pulm: Breathing Normal    Lymph Nodes: No Head and Neck Lymphadenopathy     Neuro/Psych: Orientation:Alert and Orientedx3 ; Mood/Affect:normal       A/P:  1. Seborrheic keratosis, lentigo, angioma, dermal nevus, hx if non-melanoma skin cancer, hx of metastatic melanoma  MELANOMA DISCUSSED WITH PATIENT:  I discussed the specifics of tumor, prognosis, metachronous melanoma, self exam, and genetics with the patient. I explained the need for monthly skin exams including and taught the patient how to do this. Patient was asked about new or changing moles . I discussed with patient signs and symptoms that could arise in the setting of recurrent locoregional or metastatic disease. In addition, the need to undergo every 6 month dermatologic full skin survey and evaluation given that patients with a diagnosis of melanoma are at risk of recurrence (local and distant) and of subsequent de bhumi melanoma.    2. L arm inflamed seborrheic keratosis   LN2:  Treated with LN2 for 5s for 1-2 cycles. Warned risks of blistering, pain, pigment change, scarring, and incomplete resolution.  Advised patient to return if lesions do not completely resolve.  Wound care sheet given.    BENIGN LESIONS DISCUSSED WITH PATIENT:  I discussed the specifics of tumor, prognosis, and genetics of benign lesions.  I explained that treatment of these lesions would be purely cosmetic and not medically neccessary.  I discussed with patient different removal options including excision, cautery and /or laser.      Nature and genetics of benign skin lesions dicussed with patient.  Signs and Symptoms of skin cancer discussed with patient.  ABCDEs of melanoma reviewed with patient.  Patient encouraged to perform monthly skin exams.  UV precautions reviewed with patient.  Skin care regimen reviewed with patient: Eliminate harsh soaps, i.e. Dial, zest, irsih spring; Mild soaps such as Cetaphil or Dove  sensitive skin, avoid hot or cold showers, aggressive use of emollients including vanicream, cetaphil or cerave discussed with patient.    Risks of non-melanoma skin cancer discussed with patient   Return to clinic 6 months

## 2020-08-05 NOTE — LETTER
8/5/2020         RE: Doretha Fernandez  [id number]  [place] [address]        Dear Colleague,    Thank you for referring your patient, Doretha Fernandez, to the [facility]. Please see a copy of my visit note below.    Doretha Fernandez is a 45 year old year old female patient here today for f/u h xof melanoma unknown primary.  She went to Dr. Cool for node dissection.1 out of 20 nodes positive.  No adjuvant Radiation.  of brain and PET scan both clear.  Was on  Nivolumab got 8 injection but got colitis.  She went to [place].   She was treated with high dose steroids for colitis and had a dx of bacterial pneumonia.   She was admitted for to hospital for this. She is followed by GI She is off of melanoma meds.  She was also dx with seronegative rheumatoid arthritis and was on humira which is not helping,  She is going to pain clinic now.  Scans havebeen clear.  Oncology thinks she is clear.  She notes spot on left arm itching.   Patient reports the following modifying factors none.  Associated symptoms: none.  Patient has no other skin complaints today.  Remainder of the HPI, Meds, PMH, Allergies, FH, and SH was reviewed in chart. Current PET-CT scan, physical exam and laboratory tests demonstrate no evidence of melanoma recurrence.   ON prednisone and getting infusions for inflammatory arthritis.     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      Anxiety      Basal cell carcinoma      Cervical high risk HPV (human papillomavirus) test positive 12/13/2019    See problem list     Colitis      Depression      Diabetes mellitus, iatrogenic (H) 1/28/2020     Inflammatory arthritis      Insomnia      Lumbago     left lower back pain     Lymphedema      Malignant melanoma (H)      Melanoma (H) 10/23/2017     Mild persistent asthma      Obesity      STORMY (obstructive sleep apnea)      Prothrombin deficiency (H)     takes 81mg asa daily      Stroke (cerebrum) (H)     During      TIA (transient ischemic attack)      Type 2 diabetes mellitus (H)        Past Surgical History:   Procedure Laterality Date     APPENDECTOMY       COLONOSCOPY N/A 10/18/2017    Procedure: COLONOSCOPY;  Colon;  Surgeon: Debbie Stephens MD;  [facility]     COLONOSCOPY N/A 3/9/2018    Procedure: COMBINED COLONOSCOPY, SINGLE OR MULTIPLE BIOPSY/POLYPECTOMY BY BIOPSY;  colon;  Surgeon: Benita Schumacher MD;  Location: [facility] GI     COLONOSCOPY      multiple since  to present - about 6 total     DISSECT LYMPH NODE AXILLA Left 10/23/2017    Procedure: DISSECT LYMPH NODE AXILLA;  Left Axillary Lymph Node Dissection ;  Surgeon: Laurent Cool MD;  Location: [facility] OR     EXAM UNDER ANESTHESIA PELVIC N/A 2020    Procedure: EXAM UNDER ANESTHESIA, PELVIS; with Cervical Biopsies, Vaginal Biopsy and Endocervical Curettings;  Surgeon: Melina Jung MD;  Location: [facility] OR     GYN SURGERY  ,          REPAIR MOHS Left 2017    Procedure: REPAIR MOHS;  Left Upper Lid Moh's Reconstruction;  Surgeon: Kisha Bosch MD;  Location: [facility]        Family History   Problem Relation Age of Onset     Cancer Mother 45        lung     Neurologic Disorder Mother         epilepsy     Lipids Father      Gastrointestinal Disease Father         diverticulitis      Depression Father      Cancer Maternal Grandmother      Blood Disease Maternal Grandmother         lymphoma      Arthritis Maternal Grandmother      Diabetes Maternal Grandmother      Depression Maternal Grandmother      Macular Degeneration Maternal Grandmother      Glaucoma Maternal Grandmother      Diabetes Maternal Grandfather      Cerebrovascular Disease Maternal Grandfather      Blood Disease Maternal Grandfather      Heart Disease Maternal Grandfather      Glaucoma Maternal Grandfather      Cancer Paternal Grandmother      Cancer - colorectal Paternal Grandmother      Respiratory Paternal  Grandfather         emphysema      Heart Disease Daughter      Asthma Daughter      Depression Sister      Melanoma No family hx of        Social History     Socioeconomic History     Marital status:      Spouse name: Not on file     Number of children: Not on file     Years of education: Not on file     Highest education level: Not on file   Occupational History     Not on file   Social Needs     Financial resource strain: Not on file     Food insecurity     Worry: Not on file     Inability: Not on file     Transportation needs     Medical: Not on file     Non-medical: Not on file   Tobacco Use     Smoking status: Former Smoker     Packs/day: 1.00     Years: 5.00     Pack years: 5.00     Last attempt to quit: 3/20/1998     Years since quittin.3     Smokeless tobacco: Never Used   Substance and Sexual Activity     Alcohol use: Yes     Comment: occ     Drug use: No     Sexual activity: Not Currently     Partners: Male     Birth control/protection: Surgical   Lifestyle     Physical activity     Days per week: Not on file     Minutes per session: Not on file     Stress: Not on file   Relationships     Social connections     Talks on phone: Not on file     Gets together: Not on file     Attends Protestant service: Not on file     Active member of club or organization: Not on file     Attends meetings of clubs or organizations: Not on file     Relationship status: Not on file     Intimate partner violence     Fear of current or ex partner: Not on file     Emotionally abused: Not on file     Physically abused: Not on file     Forced sexual activity: Not on file   Other Topics Concern     Parent/sibling w/ CABG, MI or angioplasty before 65F 55M? No   Social History Narrative    20 y.o- patient's mother   of lung cancer. She had to take care of her younger sister.    2012- patient's  had a heart attack with stents placed, followed by cardiac rehabilitation    2000 TO 2012   was in [facility] for depression    January 2013 patient's  went through alcohol rehabilitation at [facility] inpatient            They have attended couple counseling a couple of times and patient went to the family program for chemical dependency.    Patient denies alcohol or drug use and herself            Has 2 children, girls ages 10 and 13     For a while she was working 3 jobs since her  was ill. 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           Outpatient Encounter Medications as of 8/5/2020   Medication Sig Dispense Refill     Acetaminophen (TYLENOL PO) Take 1,000 mg by mouth every 8 hours as needed for mild pain or fever (Pt last dose 02/10/20)        blood glucose (ACCU-CHEK GUIDE) test strip 1 strip by In Vitro route 2 times daily Use to test blood sugar 2 times daily or as directed. 200 strip 11     blood glucose monitoring (ACCU-CHEK FASTCLIX) lancets 1 each by In Vitro route 2 times daily Use to test blood sugar 2 times daily or as directed.  Ok to substitute alternative if insurance prefers. 102 each 11     Calcium Carb-Cholecalciferol 600-800 MG-UNIT TABS Take 800 mg by mouth daily before breakfast       Calcium Carbonate (CALCIUM-CARB 600 PO) Take 1 tablet by mouth as needed (Pt last took 1 week ago 2/10/20)        ferrous fumarate 65 mg, Hoh. FE,-Vitamin C 125 mg (VITRON-C)  MG TABS tablet Take 1 tablet by mouth daily 30 tablet 3     gabapentin (NEURONTIN) 300 MG capsule Take 3 capsules (900 mg) by mouth 4 times daily 360 capsule 5     GOODSENSE ARTHRITIS PAIN 650 MG CR tablet        hydrOXYzine (ATARAX) 25 MG tablet TAKE ONE TABLET BY MOUTH AT BEDTIME 90 tablet 1     metFORMIN (GLUCOPHAGE-XR) 500 MG 24 hr tablet Take 2 tablets (1,000 mg) by mouth daily (with dinner) (Patient taking differently: Take 500 mg by mouth 2 times daily Transitioned to this schedule 1 week ago 2/10/20) 360 tablet 1     NEW MED  actmera infusions monthy       ondansetron (ZOFRAN) 8 MG tablet Take 8 mg by mouth every 8 hours as needed for nausea (Pt has not taken in past year 2/10/20)        oxyCODONE (ROXICODONE) 5 MG tablet Take 1 tablet (5 mg) by mouth every 8 hours as needed for pain Max 3 tabs per day. 60 tablet 0     predniSONE (DELTASONE) 5 MG tablet Take 10 mg by mouth       rosuvastatin (CRESTOR) 5 MG tablet Take 1 tablet (5 mg) by mouth daily 90 tablet 3     venlafaxine (EFFEXOR-ER) 225 MG 24 hr tablet TAKE ONE TABLET BY MOUTH ONCE DAILY 90 tablet 0     VITAMIN D3 25 MCG (1000 UT) tablet TAKE THREE TABLETS BY MOUTH ONCE DAILY 300 tablet 1     Facility-Administered Encounter Medications as of 8/5/2020   Medication Dose Route Frequency Provider Last Rate Last Dose     lidocaine 1 % 9 mL  9 mL Intradermal Once Anna Naidu MD         sodium bicarbonate 8.4 % injection 1 mEq  1 mEq Intradermal Once Anna Naidu MD                 Review Of Systems  Skin: As above  Eyes: negative  Ears/Nose/Throat: negative  Respiratory: No shortness of breath, dyspnea on exertion, cough, or hemoptysis  Cardiovascular: negative  Gastrointestinal: negative  Genitourinary: negative  Musculoskeletal: negative  Neurologic: negative  Psychiatric: negative  Hematologic/Lymphatic/Immunologic: negative  Endocrine: negative      O:   NAD, WDWN, Alert & Oriented, Mood & Affect wnl, Vitals stable   Here today alone   There were no vitals taken for this visit.   General appearance normal   Vitals stable   Alert, oriented and in no acute distress      Following lymph nodes palpated: Occipital, Cervical, Supraclavicular , axillary, inguinal, femoralno lad     Pink papules on trunk  L arm inflamed seborrheic keratosis   Stuck on papules and brown macules on trunk and ext   Red papules on trunk  Flesh colored papules on trunk     The remainder of the full exam was normal; the following areas were examined:  conjunctiva/lids, oral mucosa, neck, peripheral  vascular system, abdomen, lymph nodes, digits/nails, eccrine and apocrine glands, scalp/hair, face, neck, chest, abdomen, buttocks, back, RUE, LUE, RLE, LLE       Eyes: Conjunctivae/lids:Normal     ENT: Lips, buccal mucosa, tongue: normal    MSK:Normal    Cardiovascular: peripheral edema none    Pulm: Breathing Normal    Lymph Nodes: No Head and Neck Lymphadenopathy     Neuro/Psych: Orientation:Alert and Orientedx3 ; Mood/Affect:normal       A/P:  1. Seborrheic keratosis, lentigo, angioma, dermal nevus, hx if non-melanoma skin cancer, hx of metastatic melanoma  MELANOMA DISCUSSED WITH PATIENT:  I discussed the specifics of tumor, prognosis, metachronous melanoma, self exam, and genetics with the patient. I explained the need for monthly skin exams including and taught the patient how to do this. Patient was asked about new or changing moles . I discussed with patient signs and symptoms that could arise in the setting of recurrent locoregional or metastatic disease. In addition, the need to undergo every 6 month dermatologic full skin survey and evaluation given that patients with a diagnosis of melanoma are at risk of recurrence (local and distant) and of subsequent de bhumi melanoma.    2. L arm inflamed seborrheic keratosis   LN2:  Treated with LN2 for 5s for 1-2 cycles. Warned risks of blistering, pain, pigment change, scarring, and incomplete resolution.  Advised patient to return if lesions do not completely resolve.  Wound care sheet given.    BENIGN LESIONS DISCUSSED WITH PATIENT:  I discussed the specifics of tumor, prognosis, and genetics of benign lesions.  I explained that treatment of these lesions would be purely cosmetic and not medically neccessary.  I discussed with patient different removal options including excision, cautery and /or laser.      Nature and genetics of benign skin lesions dicussed with patient.  Signs and Symptoms of skin cancer discussed with patient.  ABCDEs of melanoma reviewed  with patient.  Patient encouraged to perform monthly skin exams.  UV precautions reviewed with patient.  Skin care regimen reviewed with patient: Eliminate harsh soaps, i.e. Dial, zest, irsih spring; Mild soaps such as Cetaphil or Dove sensitive skin, avoid hot or cold showers, aggressive use of emollients including vanicream, cetaphil or cerave discussed with patient.    Risks of non-melanoma skin cancer discussed with patient   Return to clinic 6 months      Again, thank you for allowing me to participate in the care of your patient.        Sincerely,        Lowell Bullock MD

## 2020-08-05 NOTE — NURSING NOTE
"Initial /82   Pulse 96   SpO2 99%  Estimated body mass index is 43.4 kg/m  as calculated from the following:    Height as of 7/16/20: 1.6 m (5' 3\").    Weight as of 7/16/20: 111.1 kg (245 lb). .      "

## 2020-08-07 ENCOUNTER — OFFICE VISIT (OUTPATIENT)
Dept: FAMILY MEDICINE | Facility: CLINIC | Age: 45
End: 2020-08-07
Payer: COMMERCIAL

## 2020-08-07 VITALS
HEART RATE: 88 BPM | WEIGHT: 250 LBS | TEMPERATURE: 98.7 F | OXYGEN SATURATION: 98 % | BODY MASS INDEX: 44.3 KG/M2 | DIASTOLIC BLOOD PRESSURE: 80 MMHG | HEIGHT: 63 IN | SYSTOLIC BLOOD PRESSURE: 132 MMHG | RESPIRATION RATE: 16 BRPM

## 2020-08-07 DIAGNOSIS — D84.9 IMMUNOSUPPRESSED STATUS (H): ICD-10-CM

## 2020-08-07 DIAGNOSIS — F41.1 ANXIETY STATE: ICD-10-CM

## 2020-08-07 DIAGNOSIS — G89.4 PAIN SYNDROME, CHRONIC: ICD-10-CM

## 2020-08-07 DIAGNOSIS — E24.2 DRUG-INDUCED CUSHING'S SYNDROME (H): ICD-10-CM

## 2020-08-07 DIAGNOSIS — F32.0 MILD MAJOR DEPRESSION (H): Primary | ICD-10-CM

## 2020-08-07 DIAGNOSIS — C43.9 METASTATIC MALIGNANT MELANOMA (H): Chronic | ICD-10-CM

## 2020-08-07 PROBLEM — A07.1 INTESTINAL GIARDIASIS: Status: RESOLVED | Noted: 2018-03-05 | Resolved: 2020-08-07

## 2020-08-07 PROCEDURE — 99213 OFFICE O/P EST LOW 20 MIN: CPT | Performed by: FAMILY MEDICINE

## 2020-08-07 RX ORDER — VENLAFAXINE HYDROCHLORIDE 225 MG/1
225 TABLET, EXTENDED RELEASE ORAL DAILY
Qty: 90 TABLET | Refills: 1 | Status: SHIPPED | OUTPATIENT
Start: 2020-08-07 | End: 2021-03-11

## 2020-08-07 ASSESSMENT — MIFFLIN-ST. JEOR: SCORE: 1753.12

## 2020-08-07 ASSESSMENT — PAIN SCALES - GENERAL: PAINLEVEL: MODERATE PAIN (4)

## 2020-08-07 NOTE — PATIENT INSTRUCTIONS
Our Clinic hours are:  Mondays    7:20 am - 7 pm  Tues -  Fri  7:20 am - 5 pm    Clinic Phone: [phone]    The clinic lab opens at 7:30 am Mon - Fri and appointments are required.    [facility]. [phone]  Monday  8 am - 7pm  Tues - Fri 8 am - 5:30 pm

## 2020-08-07 NOTE — PROGRESS NOTES
"Subjective     Doretha Fernandez is a 45 year old female who presents to clinic today for the following health issues:    HPI       Chief Complaint   Patient presents with     Forms     FMLA paperwork   disability, ADA paperwork.    Patient is seeking extended leave from her job due to her chronic pain, immunocompromised status (is on Actemra infusions).  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    Her pain is improving, particularly with the prednisone and accupuncture but she is also expecting some relief after the third Actemra infusion.     Reviewed and updated as needed this visit by Provider         Review of Systems   Constitutional, HEENT, cardiovascular, pulmonary, gi and gu systems are negative, except as otherwise noted.      Objective    /80   Pulse 88   Temp 98.7  F (37.1  C) (Tympanic)   Resp 16   Ht 1.6 m (5' 3\")   Wt 113.4 kg (250 lb)   SpO2 98%   Breastfeeding No   BMI 44.29 kg/m    Body mass index is 44.29 kg/m .  Physical Exam   GENERAL APPEARANCE: alert and no distress  PSYCH: mentation appears normal and affect flat    Diagnostic Test Results:  Labs reviewed in Epic        Assessment & Plan       ICD-10-CM    1. Mild major depression (H)  F32.0 venlafaxine (EFFEXOR-ER) 225 MG 24 hr tablet   2. Anxiety state  F41.1 venlafaxine (EFFEXOR-ER) 225 MG 24 hr tablet   3. Pain syndrome, chronic  G89.4    4. Drug-induced Cushing's syndrome (H)  E24.2    5. Immunosuppressed status (H)  D89.9    6. Metastatic malignant melanoma (H)  C79.9      Forms completed and will be scanned.               No follow-ups on file.    Anna Naidu MD  [facility]  "

## 2020-08-08 ASSESSMENT — ASTHMA QUESTIONNAIRES: ACT_TOTALSCORE: 14

## 2020-08-27 ENCOUNTER — OFFICE VISIT (OUTPATIENT)
Dept: PALLIATIVE MEDICINE | Facility: CLINIC | Age: 45
End: 2020-08-27
Payer: COMMERCIAL

## 2020-08-27 VITALS
SYSTOLIC BLOOD PRESSURE: 109 MMHG | HEIGHT: 63 IN | HEART RATE: 80 BPM | BODY MASS INDEX: 44.9 KG/M2 | WEIGHT: 253.4 LBS | DIASTOLIC BLOOD PRESSURE: 75 MMHG

## 2020-08-27 DIAGNOSIS — M25.50 POLYARTHRALGIA: Primary | ICD-10-CM

## 2020-08-27 PROCEDURE — 97810 ACUP 1/> WO ESTIM 1ST 15 MIN: CPT | Performed by: FAMILY MEDICINE

## 2020-08-27 PROCEDURE — 97811 ACUP 1/> W/O ESTIM EA ADD 15: CPT | Performed by: FAMILY MEDICINE

## 2020-08-27 ASSESSMENT — PAIN SCALES - GENERAL: PAINLEVEL: SEVERE PAIN (7)

## 2020-08-27 ASSESSMENT — MIFFLIN-ST. JEOR: SCORE: 1768.54

## 2020-08-27 NOTE — NURSING NOTE
"Pain Clinic Visit    Chief Complaint   Patient presents with     Acupuncture     Whole body pain - feet are worse today       After the last acupuncture session patient states: that today her right heel, achilles area, is worse today. Patient notes improvement each time but when the 3 week gap occurred she could tell.     Rating of Pain: Severe Pain (7)    Shirley Johnson Clinical Medical Assistant       Initial /75   Pulse 80   Ht 1.6 m (5' 3\")   Wt 114.9 kg (253 lb 6.4 oz)   BMI 44.89 kg/m   Estimated body mass index is 44.89 kg/m  as calculated from the following:    Height as of this encounter: 1.6 m (5' 3\").    Weight as of this encounter: 114.9 kg (253 lb 6.4 oz).  Medication Reconciliation: complete    "

## 2020-08-27 NOTE — PROGRESS NOTES
S: Very tight feeling in shoulders hips knees and elbows.  The right tendo Achilles heel area is the worst.  Doretha has noted stepwise improvement with each acupuncture treatment very gradually.  She has not had treatment in approximately 1 month due to access issues to for clinic.  She is eager for today's visit.  O:Alert NAD  A: Polyarthralgia  P:ACU PER note.  Pre-Procedure:  Patient's Name and Date of Birth verified:  YES  Verified By:  zonia (initials)  Diagnosis:  Data Unavailable  Interval History:  1m  Complications and/or Adverse Effects of Last Acupuncture Treatment: 0   Site Marking and Verification:  Verification of site selection (based on symptoms and condition:  YES  Verified by: zonia (initials)  Patient identification verified by provider: YES  Verified by: zonia (initials)  Pause for the Cause:  YES  Verified by: zonia (initials)   Procedure Note:  Acupuncture Treatment Number: 4  Acupuncture Points Selected: Four Spangler, SP6, ST36, AUR:SM,PZ    Electrical Stim: No  Frequency 0 HZ    Number of needles:  12      Re-insertion/Manipulation of needles after initial 15 minutes: YES  Time:  30   Minutes   Post-Procedure:  Complication/Adverse Effects: 0      Management:  0      Signature:  Ismael Serrano MD

## 2020-09-06 ENCOUNTER — NURSE TRIAGE (OUTPATIENT)
Dept: NURSING | Facility: CLINIC | Age: 45
End: 2020-09-06

## 2020-09-06 ENCOUNTER — HOSPITAL ENCOUNTER (EMERGENCY)
Facility: CLINIC | Age: 45
Discharge: HOME | End: 2020-09-06
Attending: FAMILY MEDICINE | Admitting: FAMILY MEDICINE
Payer: COMMERCIAL

## 2020-09-06 ENCOUNTER — APPOINTMENT (OUTPATIENT)
Dept: CT IMAGING | Facility: CLINIC | Age: 45
End: 2020-09-06
Attending: FAMILY MEDICINE
Payer: COMMERCIAL

## 2020-09-06 VITALS
RESPIRATION RATE: 20 BRPM | DIASTOLIC BLOOD PRESSURE: 94 MMHG | WEIGHT: 253 LBS | SYSTOLIC BLOOD PRESSURE: 137 MMHG | BODY MASS INDEX: 44.82 KG/M2 | OXYGEN SATURATION: 94 % | HEART RATE: 83 BPM | TEMPERATURE: 97.7 F

## 2020-09-06 DIAGNOSIS — K52.9 GASTROENTERITIS: ICD-10-CM

## 2020-09-06 LAB
ALBUMIN SERPL-MCNC: 4.1 G/DL (ref 3.4–5)
ALBUMIN UR-MCNC: NEGATIVE MG/DL
ALP SERPL-CCNC: 49 U/L (ref 40–150)
ALT SERPL W P-5'-P-CCNC: 37 U/L (ref 0–50)
ANION GAP SERPL CALCULATED.3IONS-SCNC: 5 MMOL/L (ref 3–14)
APPEARANCE UR: CLEAR
AST SERPL W P-5'-P-CCNC: 19 U/L (ref 0–45)
BACTERIA #/AREA URNS HPF: ABNORMAL /HPF
BASOPHILS # BLD AUTO: 0 10E9/L (ref 0–0.2)
BASOPHILS NFR BLD AUTO: 0.3 %
BILIRUB SERPL-MCNC: 0.2 MG/DL (ref 0.2–1.3)
BILIRUB UR QL STRIP: NEGATIVE
BUN SERPL-MCNC: 15 MG/DL (ref 7–30)
CALCIUM SERPL-MCNC: 9.4 MG/DL (ref 8.5–10.1)
CHLORIDE SERPL-SCNC: 108 MMOL/L (ref 94–109)
CO2 SERPL-SCNC: 26 MMOL/L (ref 20–32)
COLOR UR AUTO: ABNORMAL
CREAT SERPL-MCNC: 0.72 MG/DL (ref 0.52–1.04)
DIFFERENTIAL METHOD BLD: NORMAL
EOSINOPHIL # BLD AUTO: 0.1 10E9/L (ref 0–0.7)
EOSINOPHIL NFR BLD AUTO: 0.5 %
ERYTHROCYTE [DISTWIDTH] IN BLOOD BY AUTOMATED COUNT: 14 % (ref 10–15)
GFR SERPL CREATININE-BSD FRML MDRD: >90 ML/MIN/{1.73_M2}
GLUCOSE SERPL-MCNC: 102 MG/DL (ref 70–99)
GLUCOSE UR STRIP-MCNC: NEGATIVE MG/DL
HCG SERPL QL: NEGATIVE
HCT VFR BLD AUTO: 40.9 % (ref 35–47)
HGB BLD-MCNC: 13.3 G/DL (ref 11.7–15.7)
HGB UR QL STRIP: NEGATIVE
IMM GRANULOCYTES # BLD: 0.1 10E9/L (ref 0–0.4)
IMM GRANULOCYTES NFR BLD: 0.7 %
KETONES UR STRIP-MCNC: NEGATIVE MG/DL
LACTATE BLD-SCNC: 2.4 MMOL/L (ref 0.7–2)
LEUKOCYTE ESTERASE UR QL STRIP: NEGATIVE
LIPASE SERPL-CCNC: 42 U/L (ref 73–393)
LYMPHOCYTES # BLD AUTO: 2.1 10E9/L (ref 0.8–5.3)
LYMPHOCYTES NFR BLD AUTO: 19.6 %
MCH RBC QN AUTO: 30.6 PG (ref 26.5–33)
MCHC RBC AUTO-ENTMCNC: 32.5 G/DL (ref 31.5–36.5)
MCV RBC AUTO: 94 FL (ref 78–100)
MONOCYTES # BLD AUTO: 0.6 10E9/L (ref 0–1.3)
MONOCYTES NFR BLD AUTO: 5.7 %
NEUTROPHILS # BLD AUTO: 7.8 10E9/L (ref 1.6–8.3)
NEUTROPHILS NFR BLD AUTO: 73.2 %
NITRATE UR QL: NEGATIVE
NRBC # BLD AUTO: 0 10*3/UL
NRBC BLD AUTO-RTO: 0 /100
PH UR STRIP: 5 PH (ref 5–7)
PLATELET # BLD AUTO: 243 10E9/L (ref 150–450)
POTASSIUM SERPL-SCNC: 4 MMOL/L (ref 3.4–5.3)
PROT SERPL-MCNC: 7.1 G/DL (ref 6.8–8.8)
RBC # BLD AUTO: 4.35 10E12/L (ref 3.8–5.2)
RBC #/AREA URNS AUTO: <1 /HPF (ref 0–2)
SODIUM SERPL-SCNC: 139 MMOL/L (ref 133–144)
SOURCE: ABNORMAL
SP GR UR STRIP: 1.03 (ref 1–1.03)
SQUAMOUS #/AREA URNS AUTO: <1 /HPF (ref 0–1)
UROBILINOGEN UR STRIP-MCNC: 0 MG/DL (ref 0–2)
WBC # BLD AUTO: 10.7 10E9/L (ref 4–11)
WBC #/AREA URNS AUTO: 2 /HPF (ref 0–5)

## 2020-09-06 PROCEDURE — 96361 HYDRATE IV INFUSION ADD-ON: CPT | Performed by: FAMILY MEDICINE

## 2020-09-06 PROCEDURE — 25800030 ZZH RX IP 258 OP 636: Performed by: FAMILY MEDICINE

## 2020-09-06 PROCEDURE — 25000128 H RX IP 250 OP 636: Performed by: FAMILY MEDICINE

## 2020-09-06 PROCEDURE — 85025 COMPLETE CBC W/AUTO DIFF WBC: CPT | Performed by: FAMILY MEDICINE

## 2020-09-06 PROCEDURE — 81001 URINALYSIS AUTO W/SCOPE: CPT | Performed by: FAMILY MEDICINE

## 2020-09-06 PROCEDURE — 83690 ASSAY OF LIPASE: CPT | Performed by: FAMILY MEDICINE

## 2020-09-06 PROCEDURE — 99285 EMERGENCY DEPT VISIT HI MDM: CPT | Mod: Z6 | Performed by: FAMILY MEDICINE

## 2020-09-06 PROCEDURE — 80053 COMPREHEN METABOLIC PANEL: CPT | Performed by: FAMILY MEDICINE

## 2020-09-06 PROCEDURE — 84703 CHORIONIC GONADOTROPIN ASSAY: CPT | Performed by: FAMILY MEDICINE

## 2020-09-06 PROCEDURE — 96376 TX/PRO/DX INJ SAME DRUG ADON: CPT | Performed by: FAMILY MEDICINE

## 2020-09-06 PROCEDURE — 74177 CT ABD & PELVIS W/CONTRAST: CPT

## 2020-09-06 PROCEDURE — 96375 TX/PRO/DX INJ NEW DRUG ADDON: CPT | Performed by: FAMILY MEDICINE

## 2020-09-06 PROCEDURE — 99285 EMERGENCY DEPT VISIT HI MDM: CPT | Mod: 25 | Performed by: FAMILY MEDICINE

## 2020-09-06 PROCEDURE — 83605 ASSAY OF LACTIC ACID: CPT | Performed by: FAMILY MEDICINE

## 2020-09-06 PROCEDURE — 25000125 ZZHC RX 250: Performed by: FAMILY MEDICINE

## 2020-09-06 PROCEDURE — 96374 THER/PROPH/DIAG INJ IV PUSH: CPT | Mod: 59 | Performed by: FAMILY MEDICINE

## 2020-09-06 RX ORDER — ONDANSETRON 4 MG/1
4 TABLET, ORALLY DISINTEGRATING ORAL EVERY 8 HOURS PRN
Qty: 20 TABLET | Refills: 0 | Status: SHIPPED | OUTPATIENT
Start: 2020-09-06 | End: 2020-09-09

## 2020-09-06 RX ORDER — HYDROMORPHONE HYDROCHLORIDE 1 MG/ML
0.5 INJECTION, SOLUTION INTRAMUSCULAR; INTRAVENOUS; SUBCUTANEOUS ONCE
Status: COMPLETED | OUTPATIENT
Start: 2020-09-06 | End: 2020-09-06

## 2020-09-06 RX ORDER — IOPAMIDOL 755 MG/ML
100 INJECTION, SOLUTION INTRAVASCULAR ONCE
Status: COMPLETED | OUTPATIENT
Start: 2020-09-06 | End: 2020-09-06

## 2020-09-06 RX ORDER — ONDANSETRON 2 MG/ML
4 INJECTION INTRAMUSCULAR; INTRAVENOUS
Status: COMPLETED | OUTPATIENT
Start: 2020-09-06 | End: 2020-09-06

## 2020-09-06 RX ORDER — PROMETHAZINE HYDROCHLORIDE 25 MG/1
25 TABLET ORAL EVERY 6 HOURS PRN
Qty: 10 TABLET | Refills: 0 | Status: SHIPPED | OUTPATIENT
Start: 2020-09-06 | End: 2020-12-18

## 2020-09-06 RX ORDER — SODIUM CHLORIDE, SODIUM LACTATE, POTASSIUM CHLORIDE, CALCIUM CHLORIDE 600; 310; 30; 20 MG/100ML; MG/100ML; MG/100ML; MG/100ML
INJECTION, SOLUTION INTRAVENOUS CONTINUOUS
Status: DISCONTINUED | OUTPATIENT
Start: 2020-09-06 | End: 2020-09-07 | Stop reason: HOSPADM

## 2020-09-06 RX ORDER — ONDANSETRON 2 MG/ML
4 INJECTION INTRAMUSCULAR; INTRAVENOUS ONCE
Status: COMPLETED | OUTPATIENT
Start: 2020-09-06 | End: 2020-09-06

## 2020-09-06 RX ADMIN — SODIUM CHLORIDE, POTASSIUM CHLORIDE, SODIUM LACTATE AND CALCIUM CHLORIDE 1000 ML: 600; 310; 30; 20 INJECTION, SOLUTION INTRAVENOUS at 19:57

## 2020-09-06 RX ADMIN — HYDROMORPHONE HYDROCHLORIDE 0.5 MG: 1 INJECTION, SOLUTION INTRAMUSCULAR; INTRAVENOUS; SUBCUTANEOUS at 20:11

## 2020-09-06 RX ADMIN — PROMETHAZINE HYDROCHLORIDE 12.5 MG: 25 INJECTION INTRAMUSCULAR; INTRAVENOUS at 22:15

## 2020-09-06 RX ADMIN — ONDANSETRON 4 MG: 2 INJECTION INTRAMUSCULAR; INTRAVENOUS at 19:57

## 2020-09-06 RX ADMIN — ONDANSETRON 4 MG: 2 INJECTION INTRAMUSCULAR; INTRAVENOUS at 21:43

## 2020-09-06 RX ADMIN — SODIUM CHLORIDE 71 ML: 9 INJECTION, SOLUTION INTRAVENOUS at 20:41

## 2020-09-06 RX ADMIN — IOPAMIDOL 100 ML: 755 INJECTION, SOLUTION INTRAVENOUS at 20:41

## 2020-09-06 NOTE — TELEPHONE ENCOUNTER
"Patient reports that she was throwing up  On Friday night.  Thought she had food poisoning.  Took Zofran and was able to stop throwing up.  When sit up has a lump the size of a head from navel up.     Is nausea, stomach pains. No fever.  Stabbing pains in lower stomach. Burping a lot, no flatulence.  Thinking last BM was Friday night.     Pain - 7/10    Advised to see HCP within 4 hours per protocol.     Additional Information    Negative: Severe difficulty breathing (e.g., struggling for each breath, speaks in single words)    Negative: Shock suspected (e.g., cold/pale/clammy skin, too weak to stand, low BP, rapid pulse)    Negative: Difficult to awaken or acting confused (e.g., disoriented, slurred speech)    Negative: Passed out (i.e., lost consciousness, collapsed and was not responding)    Negative: Visible sweat on face or sweat dripping down face    Negative: Sounds like a life-threatening emergency to the triager    Negative: Followed an abdomen (stomach) injury    Negative: Chest pain    Negative: [1] SEVERE pain (e.g., excruciating) AND [2] present > 1 hour    Negative: [1] Pain lasts > 10 minutes AND [2] age > 50    Negative: [1] Pain lasts > 10 minutes AND [2] age > 40 AND [3] associated chest, arm, neck, upper back or jaw pain    Negative: [1] Pain lasts > 10 minutes AND [2] age > 35 AND [3] at least one cardiac risk factor (i.e., hypertension, diabetes, obesity, smoker or strong family history of heart disease)    Negative: [1] Pain lasts > 10 minutes AND [2] history of heart disease (i.e., heart attack, bypass surgery, angina, angioplasty, CHF; not just a heart murmur)    Negative: [1] Pain lasts > 10 minutes AND [2] difficulty breathing    Negative: [1] Vomiting AND [2] contains red blood  (Exception: few streaks and only occurred once)    Negative: [1] Vomiting AND [2] contains black (\"coffee ground\") material    Negative: Blood in bowel movements  (Exception: Blood on surface of BM with " constipation)    Negative: Black or tarry bowel movements  (Exception: chronic-unchanged  black-grey bowel movements AND is taking iron pills or Pepto-bismol)    Negative: [1] Pregnant > 24 weeks AND [2] hand or face swelling    Negative: Patient sounds very sick or weak to the triager    [1] MILD-MODERATE pain AND [2] constant AND [3] present > 2 hours    Protocols used: ABDOMINAL PAIN - UPPER-A-AH

## 2020-09-07 NOTE — ED NOTES
Pt has some redness noted on from of chest and neck, not itching. vss and breathing normal. Will monitor

## 2020-09-07 NOTE — DISCHARGE INSTRUCTIONS
Fluids, bland diet until clearly improving.  You may use Zofran/Phenergan as needed for nausea or vomiting.  Tylenol for pain.  Return to the emergency department if worse or changes.

## 2020-09-07 NOTE — ED PROVIDER NOTES
History     Chief Complaint   Patient presents with     Nausea & Vomiting     with abdominal swelling. lower abdominal pain.      Abdominal Pain     HPI  Doretha Fernandez is a 45 year old female, past medical history is significant for chronic pain syndrome, Cushing syndrome, type 2 diabetes, STORMY, chronic neutrophilia, ulcerative colitis, morbid obesity, arthritis rheumatoid, hypocalcemia, anemia, metastatic malignant melanoma, anxiety, GERD, dysfunctional uterine bleeding, presents to the emergency department with concerns of abdominal pain nausea and vomiting beginning 2 days prior to presentation.  Patient states that she suspects she may have gotten some bad food although is not sure what she might of eaten begin with nausea and vomiting Friday evening persisting all the way through now till Sunday forceful vomiting, minimal fluid intake.  No solids since Friday.  No diarrhea, in fact she does not think she is had a bowel movement since before this started.  Passing gas though.  She has noticed since the nausea and vomiting that she seems to have a bulge in her upper abdomen which if she touches it is painful.  She notes no fever chills or sweats.  No urinary tract symptoms.  She has not tried any medicine for pain however did have Zofran available at home which did help with the nausea and vomiting.  The patient thinks it might be related to some homemade guacamole however other people have eaten the guacamole and have not been sick at all.    Allergies:  Allergies   Allergen Reactions     Bee Venom Swelling     Azithromycin Diarrhea     Erythromycin      Other reaction(s): GI intolerance, Vomiting     Fentanyl Other (See Comments)     sweating  sweating     Prochlorperazine Fatigue     Other reaction(s): Other (see comments)  Fatigue     Buspirone      Other reaction(s): GI intolerance  vomiting     Erythrocin Nausea and Vomiting     Zithromax [Azithromycin Dihydrate] Diarrhea     Enbrel [Etanercept] Hives and  Rash       Problem List:    Patient Active Problem List    Diagnosis Date Noted     Pain syndrome, chronic 02/12/2020     Priority: Medium     Drug-induced Cushing's syndrome (H) 02/12/2020     Priority: Medium     Diabetes mellitus, iatrogenic (H) 01/28/2020     Priority: Medium     High risk HPV infection 01/28/2020     Priority: Medium     Added automatically from request for surgery [id number]       Immunosuppression (H) 01/28/2020     Priority: Medium     Added automatically from request for surgery [id number]       STORMY (obstructive sleep apnea) 01/27/2020     Priority: Medium     1/2019 (241#)-AHI 24, lowest oxygen saturation was 86%, no periodic limb movement were noted, CPAP 8 cm/H20 was effective.       Secondary lymphedema 01/27/2020     Priority: Medium     Chronic neutrophilia 01/27/2020     Priority: Medium     Cervical high risk HPV (human papillomavirus) test positive 12/13/2019     Priority: Medium     2011 NIL pap.  2015 NIL pap, Neg HPV.  12/13/19 NIL pap , + HR HPV 16. Plan colp.   1/6/19 Failed colp exam secondary to anatomic constraints with gyn. Referred to gyn/onc. Tracking ended.        Immunosuppressed status (H) 09/05/2019     Priority: Medium     Ulcerative colitis with complication, unspecified location (H) 09/05/2019     Priority: Medium     Morbid obesity (H) 09/05/2019     Priority: Medium     Arthritis, rheumatoid (H) 11/26/2018     Priority: Medium     Hypocalcemia 05/15/2018     Priority: Medium     Anemia 05/14/2018     Priority: Medium     Menstrual irregularity 05/14/2018     Priority: Medium     Abdominal pain 05/10/2018     Priority: Medium     Candidiasis of mouth 05/10/2018     Priority: Medium     Malaise 05/10/2018     Priority: Medium     Other chronic pain 05/06/2018     Priority: Medium     Rash and nonspecific skin eruption 04/05/2018     Priority: Medium     Bilateral leg cramps 03/07/2018     Priority: Medium     Rectal bleeding 03/04/2018     Priority: Medium     Colitis  03/01/2018     Priority: Medium     Functional diarrhea 02/22/2018     Priority: Medium     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/07/2017     Priority: Medium     Malignant melanoma of left upper extremity including shoulder (H) 10/12/2017     Priority: Medium     Metastatic malignant melanoma (H) 10/10/2017     Priority: Medium     Prothrombin mutation (H) 04/05/2017     Priority: Medium     On daily aspirin 81 mg per hematology's recommendations from 2008       Vision changes 04/01/2017     Priority: Medium     Mild intermittent asthma without complication 11/08/2013     Priority: Medium     Mild major depression (H) 06/24/2013     Priority: Medium     Anxiety state 09/13/2012     Priority: Medium     Problem list name updated by automated process. Provider to review       Esophageal reflux 09/13/2012     Priority: Medium     DUB (dysfunctional uterine bleeding) 07/28/2011     Priority: Medium     CARDIOVASCULAR SCREENING; LDL GOAL LESS THAN 160 07/28/2011     Priority: Low        Past Medical History:    Past Medical History:   Diagnosis Date     Abnormal MRI      Anxiety      Basal cell carcinoma      Cervical high risk HPV (human papillomavirus) test positive 12/13/2019     Colitis      Depression      Diabetes mellitus, iatrogenic (H) 1/28/2020     Inflammatory arthritis      Insomnia      Intestinal giardiasis 3/5/2018     Lumbago      Lymphedema      Malignant melanoma (H)      Melanoma (H) 10/23/2017     Mild persistent asthma      Obesity      STORMY (obstructive sleep apnea)      Prothrombin deficiency (H)      Stroke (cerebrum) (H) 2004     TIA (transient ischemic attack) 2004     Type 2 diabetes mellitus (H)        Past Surgical History:    Past Surgical History:   Procedure Laterality Date     APPENDECTOMY  2004     COLONOSCOPY N/A 10/18/2017    Procedure: COLONOSCOPY;  Colon;  Surgeon: Debbie Stehpens MD;  [facility]     COLONOSCOPY N/A 3/9/2018     Procedure: COMBINED COLONOSCOPY, SINGLE OR MULTIPLE BIOPSY/POLYPECTOMY BY BIOPSY;  colon;  Surgeon: Benita Schumacher MD;  Location: [facility]     COLONOSCOPY      multiple since 2018 to present - about 6 total     DISSECT LYMPH NODE AXILLA Left 10/23/2017    Procedure: DISSECT LYMPH NODE AXILLA;  Left Axillary Lymph Node Dissection ;  Surgeon: Laurent Cool MD;  Location: [facility] OR     EXAM UNDER ANESTHESIA PELVIC N/A 2020    Procedure: EXAM UNDER ANESTHESIA, PELVIS; with Cervical Biopsies, Vaginal Biopsy and Endocervical Curettings;  Surgeon: Melina Jung MD;  Location: [facility] OR     GYN SURGERY  ,          REPAIR MOHS Left 2017    Procedure: REPAIR MOHS;  Left Upper Lid Moh's Reconstruction;  Surgeon: Kisha Bosch MD;  Location:  OR       Family History:    Family History   Problem Relation Age of Onset     Cancer Mother 45        lung     Neurologic Disorder Mother         epilepsy     Lipids Father      Gastrointestinal Disease Father         diverticulitis      Depression Father      Cancer Maternal Grandmother      Blood Disease Maternal Grandmother         lymphoma      Arthritis Maternal Grandmother      Diabetes Maternal Grandmother      Depression Maternal Grandmother      Macular Degeneration Maternal Grandmother      Glaucoma Maternal Grandmother      Diabetes Maternal Grandfather      Cerebrovascular Disease Maternal Grandfather      Blood Disease Maternal Grandfather      Heart Disease Maternal Grandfather      Glaucoma Maternal Grandfather      Cancer Paternal Grandmother      Cancer - colorectal Paternal Grandmother      Respiratory Paternal Grandfather         emphysema      Heart Disease Daughter      Asthma Daughter      Depression Sister      Melanoma No family hx of        Social History:  Marital Status:   [4]  Social History     Tobacco Use     Smoking status: Former Smoker     Packs/day: 1.00     Years: 5.00     Pack years: 5.00     Last attempt to quit:  3/20/1998     Years since quittin.4     Smokeless tobacco: Never Used   Substance Use Topics     Alcohol use: Yes     Comment: occ     Drug use: No        Medications:    ondansetron (ZOFRAN ODT) 4 MG ODT tab  promethazine (PHENERGAN) 25 MG tablet  Acetaminophen (TYLENOL PO)  blood glucose (ACCU-CHEK GUIDE) test strip  blood glucose monitoring (ACCU-CHEK FASTCLIX) lancets  Calcium Carb-Cholecalciferol 600-800 MG-UNIT TABS  Calcium Carbonate (CALCIUM-CARB 600 PO)  ferrous fumarate 65 mg, [place]. FE,-Vitamin C 125 mg (VITRON-C)  MG TABS tablet  gabapentin (NEURONTIN) 300 MG capsule  GOODSENSE ARTHRITIS PAIN 650 MG CR tablet  hydrOXYzine (ATARAX) 25 MG tablet  metFORMIN (GLUCOPHAGE-XR) 500 MG 24 hr tablet  NEW MED  ondansetron (ZOFRAN) 8 MG tablet  oxyCODONE (ROXICODONE) 5 MG tablet  predniSONE (DELTASONE) 5 MG tablet  rosuvastatin (CRESTOR) 5 MG tablet  venlafaxine (EFFEXOR-ER) 225 MG 24 hr tablet  VITAMIN D3 25 MCG (1000 UT) tablet          Review of Systems   All other systems reviewed and are negative.      Physical Exam   BP: 138/82  Pulse: 102  Temp: 97.7  F (36.5  C)  Resp: 20  Weight: 114.8 kg (253 lb)  SpO2: 96 %      Physical Exam  Vitals signs and nursing note reviewed.   Constitutional:       General: She is in acute distress.      Appearance: She is well-developed. She is obese. She is ill-appearing. She is not toxic-appearing or diaphoretic.   HENT:      Head: Normocephalic and atraumatic.      Mouth/Throat:      Mouth: Mucous membranes are moist.      Pharynx: Oropharynx is clear.   Eyes:      Extraocular Movements: Extraocular movements intact.      Pupils: Pupils are equal, round, and reactive to light.   Cardiovascular:      Rate and Rhythm: Normal rate and regular rhythm.      Heart sounds: Normal heart sounds.   Pulmonary:      Effort: Pulmonary effort is normal.      Breath sounds: Normal breath sounds.   Abdominal:      Comments: Obese abdomen, possible ventral hernia/diastasis recti  upper midline abd,  tender in the area palpated.  Active bowel sounds.Voluntary guarding no rebound no referred pain.  No CVA tenderness.   Skin:     General: Skin is warm and dry.      Capillary Refill: Capillary refill takes less than 2 seconds.   Neurological:      General: No focal deficit present.      Mental Status: She is alert and oriented to person, place, and time.   Psychiatric:         Mood and Affect: Mood normal.         Behavior: Behavior normal.         ED Course        Procedures           The Lactic acid level is elevated due to dehydration, at this time there is no sign of severe sepsis or septic shock.        Critical Care time:  none               Results for orders placed or performed during the hospital encounter of 09/06/20 (from the past 24 hour(s))   CBC with platelets differential   Result Value Ref Range    WBC 10.7 4.0 - 11.0 10e9/L    RBC Count 4.35 3.8 - 5.2 10e12/L    Hemoglobin 13.3 11.7 - 15.7 g/dL    Hematocrit 40.9 35.0 - 47.0 %    MCV 94 78 - 100 fl    MCH 30.6 26.5 - 33.0 pg    MCHC 32.5 31.5 - 36.5 g/dL    RDW 14.0 10.0 - 15.0 %    Platelet Count 243 150 - 450 10e9/L    Diff Method Automated Method     % Neutrophils 73.2 %    % Lymphocytes 19.6 %    % Monocytes 5.7 %    % Eosinophils 0.5 %    % Basophils 0.3 %    % Immature Granulocytes 0.7 %    Nucleated RBCs 0 0 /100    Absolute Neutrophil 7.8 1.6 - 8.3 10e9/L    Absolute Lymphocytes 2.1 0.8 - 5.3 10e9/L    Absolute Monocytes 0.6 0.0 - 1.3 10e9/L    Absolute Eosinophils 0.1 0.0 - 0.7 10e9/L    Absolute Basophils 0.0 0.0 - 0.2 10e9/L    Abs Immature Granulocytes 0.1 0 - 0.4 10e9/L    Absolute Nucleated RBC 0.0    Comprehensive metabolic panel   Result Value Ref Range    Sodium 139 133 - 144 mmol/L    Potassium 4.0 3.4 - 5.3 mmol/L    Chloride 108 94 - 109 mmol/L    Carbon Dioxide 26 20 - 32 mmol/L    Anion Gap 5 3 - 14 mmol/L    Glucose 102 (H) 70 - 99 mg/dL    Urea Nitrogen 15 7 - 30 mg/dL    Creatinine 0.72 0.52 - 1.04 mg/dL     GFR Estimate >90 >60 mL/min/[1.73_m2]    GFR Estimate If Black >90 >60 mL/min/[1.73_m2]    Calcium 9.4 8.5 - 10.1 mg/dL    Bilirubin Total 0.2 0.2 - 1.3 mg/dL    Albumin 4.1 3.4 - 5.0 g/dL    Protein Total 7.1 6.8 - 8.8 g/dL    Alkaline Phosphatase 49 40 - 150 U/L    ALT 37 0 - 50 U/L    AST 19 0 - 45 U/L   Lipase   Result Value Ref Range    Lipase 42 (L) 73 - 393 U/L   Lactic acid whole blood   Result Value Ref Range    Lactic Acid 2.4 (H) 0.7 - 2.0 mmol/L   HCG qualitative Blood   Result Value Ref Range    HCG Qualitative Serum Negative NEG^Negative   CT Abdomen Pelvis w Contrast    Narrative    EXAM: CT ABDOMEN PELVIS W CONTRAST  LOCATION: [facility]  DATE/TIME: 9/6/2020 8:40 PM    INDICATION:  Central abdominal pain, ventral hernia  COMPARISON: 12/16/2019  TECHNIQUE: CT scan of the abdomen and pelvis was performed following injection of IV contrast. Multiplanar reformats were obtained. Dose reduction techniques were used.  CONTRAST: 100 mL Isovue-370    FINDINGS:   LOWER CHEST: Normal.    HEPATOBILIARY: Normal.    PANCREAS: Diffuse fatty replacement.    SPLEEN: Normal.    ADRENAL GLANDS: Normal.    KIDNEYS/BLADDER: Normal.    BOWEL: Normal. Tiny fat-containing umbilical hernia.    LYMPH NODES: Normal.    VASCULATURE: Unremarkable.    PELVIC ORGANS: Normal.    MUSCULOSKELETAL: Normal.      Impression    IMPRESSION:   1.  No acute abnormality in the abdomen or pelvis.   UA with Microscopic reflex to Culture    Specimen: Unspecified Urine   Result Value Ref Range    Color Urine Straw     Appearance Urine Clear     Glucose Urine Negative NEG^Negative mg/dL    Bilirubin Urine Negative NEG^Negative    Ketones Urine Negative NEG^Negative mg/dL    Specific Gravity Urine 1.030 1.003 - 1.035    Blood Urine Negative NEG^Negative    pH Urine 5.0 5.0 - 7.0 pH    Protein Albumin Urine Negative NEG^Negative mg/dL    Urobilinogen mg/dL 0.0 0.0 - 2.0 mg/dL    Nitrite Urine Negative NEG^Negative    Leukocyte  Esterase Urine Negative NEG^Negative    Source Unspecified Urine     WBC Urine 2 0 - 5 /HPF    RBC Urine <1 0 - 2 /HPF    Bacteria Urine Few (A) NEG^Negative /HPF    Squamous Epithelial /HPF Urine <1 0 - 1 /HPF       Medications   lactated ringers BOLUS 1,000 mL (1,000 mLs Intravenous New Bag 9/6/20 1957)     Followed by   lactated ringers infusion (has no administration in time range)   lactated ringers BOLUS 1,000 mL (has no administration in time range)   promethazine (PHENERGAN) 12.5 mg in sodium chloride 0.9 % 50 mL intermittent infusion (12.5 mg Intravenous Given 9/6/20 2215)   ondansetron (ZOFRAN) injection 4 mg (4 mg Intravenous Given 9/6/20 1957)   iopamidol (ISOVUE-370) solution 100 mL (100 mLs Intravenous Given 9/6/20 2041)   sodium chloride 0.9 % bag 500mL for CT scan flush use (71 mLs Intravenous Given 9/6/20 2041)   HYDROmorphone (PF) (DILAUDID) injection 0.5 mg (0.5 mg Intravenous Given 9/6/20 2011)   ondansetron (ZOFRAN) injection 4 mg (4 mg Intravenous Given 9/6/20 2143)   10:06 PM  Recheck finds the patient still nauseated although minimal discomfort.  I reviewed all of her evaluation thus far in the room with her including actual images of the CT scan of the area of concern.  I think which she is appreciating is probably gas in the upper abdomen, there is no evidence for ventral hernia and no abnormal findings anterior to the linea alba upper our abdomen where she has discomfort.   We will plan on additional fluids and further antiemetic to get this under control for her, probable disposition to home.  10:56 PM  Some improvement after the Phenergan, she is willing to try oral fluids currently.  11:22 PM  Able to tolerate oral fluids and amenable to home disposition.  Assessments & Plan (with Medical Decision Making)   45-year-old female past medical history reviewed as above who presents to the emergency department concerns of abdominal pain nausea and vomiting as described in HPI.  Clearly  uncomfortable on exam, tender in the upper abdomen as discussed in the exam portion.  Lab diagnostics are reviewed as well as abdominal CT and I reviewed in the room with the patient.  No significant imaging abnormalities, mildly elevated lactate that I think is attributable to dehydration.  Non-elevation of white cell count.  CMP displays no significant abnormalities.  The patient improved through the course of the emergency department visit with IV fluids and with antiemetics.  She was able to tolerate oral fluids before disposition to home.  Recommended bland diet, clear fluids until clearly improving at home.  She may use Zofran for Phenergan for nausea/vomiting.      Disclaimer: This note consists of symbols derived from keyboarding, dictation and/or voice recognition software. As a result, there may be errors in the script that have gone undetected. Please consider this when interpreting information found in this chart.      I have reviewed the nursing notes.    I have reviewed the findings, diagnosis, plan and need for follow up with the patient.          New Prescriptions    ONDANSETRON (ZOFRAN ODT) 4 MG ODT TAB    Take 1 tablet (4 mg) by mouth every 8 hours as needed for nausea or vomiting    PROMETHAZINE (PHENERGAN) 25 MG TABLET    Take 1 tablet (25 mg) by mouth every 6 hours as needed for nausea       Final diagnoses:   Gastroenteritis       9/6/2020   [facility] EMERGENCY DEPARTMENT     Ismael Serrano MD  09/08/20 1145

## 2020-09-07 NOTE — ED NOTES
DATE:  9/6/2020   TIME OF RECEIPT FROM LAB:  2017  LAB TEST:  lactate  LAB VALUE:  2.4  RESULTS GIVEN WITH READ-BACK TO (PROVIDER):  Ismael Serrano MD  TIME LAB VALUE REPORTED TO PROVIDER:   2020

## 2020-09-08 ENCOUNTER — TELEPHONE (OUTPATIENT)
Dept: FAMILY MEDICINE | Facility: CLINIC | Age: 45
End: 2020-09-08

## 2020-09-08 NOTE — TELEPHONE ENCOUNTER
Dr. Naidu - is this something you would want to manage at [facility]?  Please advise.    Rupal Fleming RN

## 2020-09-08 NOTE — TELEPHONE ENCOUNTER
Reason for Call: Request for an order or referral:    Order or referral being requested: Doug from [facility] [employer] calling.  Pt is having Actemra infusions done @ [place] and they're very expensive and not covered by insurance when being done @ [place].  Pt refuses home infusion and wants [place] [facility] [place] [facility] [place].  In order to have infusion done there, a [facility] provider would need to put in an order.  Doug wants to know if PCP would be willing to place order?    Actemra infusion  ICD10 Code    Diagnosis Code M06.9  Dose 8mg per kg - once every 4 weeks  Please call Doug and advise.      Date needed: at your convenience    Has the patient been seen by the PCP for this problem? NO    Additional comments:     Phone number Doug can be reached at:  Other phone number:  [phone]    Best Time:  any    Can we leave a detailed message on this number?  YES    Call taken on 9/8/2020 at 2:45 PM by Jena Quintero

## 2020-09-09 NOTE — TELEPHONE ENCOUNTER
"The issue becomes if there are problems with the Actemra, I would be the .  This is not a medication I manage or know very much about.  Why does she not want to have home infusions that can be monitored by the doctors at [place]?  It's hard to be the \"point person\" for infusions that you don't typically manage.    Anna Naidu M.D.    "

## 2020-09-10 NOTE — TELEPHONE ENCOUNTER
Doug from [employer] called us back. I read her the notes, and she does not understand why this patient does not want to do in home infusion. Doug states that there is really no other alternative for the patient, but she will talk to Doretha and try an appeal. [employer] is asking their members to do a lower cost way of doing things and the home infusion would be lower cost. Patient does not want to do this due to Covid.  Again, Doug will work with the patient, and totally understands why Dr. Naidu would not want to deal with this. Doug will try the Appeal.  [facility]  [facility]

## 2020-09-23 ENCOUNTER — OFFICE VISIT (OUTPATIENT)
Dept: FAMILY MEDICINE | Facility: CLINIC | Age: 45
End: 2020-09-23
Payer: COMMERCIAL

## 2020-09-23 VITALS
HEIGHT: 63 IN | TEMPERATURE: 98 F | BODY MASS INDEX: 43.91 KG/M2 | OXYGEN SATURATION: 97 % | HEART RATE: 95 BPM | WEIGHT: 247.8 LBS | RESPIRATION RATE: 16 BRPM

## 2020-09-23 DIAGNOSIS — M62.08 RECTUS DIASTASIS: ICD-10-CM

## 2020-09-23 DIAGNOSIS — K51.919 ULCERATIVE COLITIS WITH COMPLICATION, UNSPECIFIED LOCATION (H): Primary | ICD-10-CM

## 2020-09-23 DIAGNOSIS — M06.9 RHEUMATOID ARTHRITIS, INVOLVING UNSPECIFIED SITE, UNSPECIFIED RHEUMATOID FACTOR PRESENCE: ICD-10-CM

## 2020-09-23 DIAGNOSIS — G89.4 PAIN SYNDROME, CHRONIC: ICD-10-CM

## 2020-09-23 LAB
BASOPHILS # BLD AUTO: 0 10E9/L (ref 0–0.2)
BASOPHILS NFR BLD AUTO: 0.4 %
CRP SERPL-MCNC: <2.9 MG/L (ref 0–8)
DIFFERENTIAL METHOD BLD: ABNORMAL
EOSINOPHIL # BLD AUTO: 0.1 10E9/L (ref 0–0.7)
EOSINOPHIL NFR BLD AUTO: 0.6 %
ERYTHROCYTE [DISTWIDTH] IN BLOOD BY AUTOMATED COUNT: 13.8 % (ref 10–15)
ERYTHROCYTE [SEDIMENTATION RATE] IN BLOOD BY WESTERGREN METHOD: 2 MM/H (ref 0–20)
HCT VFR BLD AUTO: 40.1 % (ref 35–47)
HGB BLD-MCNC: 13.4 G/DL (ref 11.7–15.7)
LYMPHOCYTES # BLD AUTO: 1.7 10E9/L (ref 0.8–5.3)
LYMPHOCYTES NFR BLD AUTO: 15.6 %
MCH RBC QN AUTO: 30.9 PG (ref 26.5–33)
MCHC RBC AUTO-ENTMCNC: 33.4 G/DL (ref 31.5–36.5)
MCV RBC AUTO: 93 FL (ref 78–100)
MONOCYTES # BLD AUTO: 0.6 10E9/L (ref 0–1.3)
MONOCYTES NFR BLD AUTO: 5.2 %
NEUTROPHILS # BLD AUTO: 8.6 10E9/L (ref 1.6–8.3)
NEUTROPHILS NFR BLD AUTO: 78.2 %
PLATELET # BLD AUTO: 223 10E9/L (ref 150–450)
RBC # BLD AUTO: 4.33 10E12/L (ref 3.8–5.2)
WBC # BLD AUTO: 11 10E9/L (ref 4–11)

## 2020-09-23 PROCEDURE — 87506 IADNA-DNA/RNA PROBE TQ 6-11: CPT | Performed by: FAMILY MEDICINE

## 2020-09-23 PROCEDURE — 87328 CRYPTOSPORIDIUM AG IA: CPT | Performed by: FAMILY MEDICINE

## 2020-09-23 PROCEDURE — 87493 C DIFF AMPLIFIED PROBE: CPT | Mod: 59 | Performed by: FAMILY MEDICINE

## 2020-09-23 PROCEDURE — 85025 COMPLETE CBC W/AUTO DIFF WBC: CPT | Performed by: FAMILY MEDICINE

## 2020-09-23 PROCEDURE — 86140 C-REACTIVE PROTEIN: CPT | Performed by: FAMILY MEDICINE

## 2020-09-23 PROCEDURE — 36415 COLL VENOUS BLD VENIPUNCTURE: CPT | Performed by: FAMILY MEDICINE

## 2020-09-23 PROCEDURE — 85652 RBC SED RATE AUTOMATED: CPT | Performed by: FAMILY MEDICINE

## 2020-09-23 PROCEDURE — 87329 GIARDIA AG IA: CPT | Mod: 59 | Performed by: FAMILY MEDICINE

## 2020-09-23 PROCEDURE — 99214 OFFICE O/P EST MOD 30 MIN: CPT | Performed by: FAMILY MEDICINE

## 2020-09-23 RX ORDER — OXYCODONE HYDROCHLORIDE 5 MG/1
5 TABLET ORAL EVERY 8 HOURS PRN
Qty: 60 TABLET | Refills: 0 | Status: SHIPPED | OUTPATIENT
Start: 2020-09-23 | End: 2020-12-18

## 2020-09-23 ASSESSMENT — MIFFLIN-ST. JEOR: SCORE: 1735.2

## 2020-09-23 ASSESSMENT — PATIENT HEALTH QUESTIONNAIRE - PHQ9: SUM OF ALL RESPONSES TO PHQ QUESTIONS 1-9: 18

## 2020-09-23 ASSESSMENT — PAIN SCALES - GENERAL: PAINLEVEL: MODERATE PAIN (4)

## 2020-09-23 NOTE — PATIENT INSTRUCTIONS
Our Clinic hours are:  Mondays    7:20 am - 7 pm  Tues -  Fri  7:20 am - 5 pm    Clinic Phone: [phone]    The clinic lab opens at 7:30 am Mon - Fri and appointments are required.    [facility]. [phone]  Monday  8 am - 7pm  Tues - Fri 8 am - 5:30 pm

## 2020-09-23 NOTE — PROGRESS NOTES
"Subjective     Doretha Fernandez is a 45 year old female who presents to clinic today for the following health issues:    HPI       ED/UC Followup:    Facility:  [facility] ED  Date of visit: 9/6/2020  Reason for visit: Abdominal pain, abdominal pain, nausea and vomiting  Current Status: have soft stools often, bowels movement about 5-8 daily, still having abdominal pain, acid reflux.         Review of Systems   Constitutional, neuro, ENT, endocrine, pulmonary, cardiac, gastrointestinal, genitourinary, musculoskeletal, integument and psychiatric systems are negative, except as otherwise noted.       Objective    Pulse 95   Temp 98  F (36.7  C) (Tympanic)   Resp 16   Ht 1.588 m (5' 2.5\")   Wt 112.4 kg (247 lb 12.8 oz)   LMP 09/02/2020   SpO2 97%   Breastfeeding No   BMI 44.60 kg/m    Body mass index is 44.6 kg/m .  Physical Exam   GENERAL: Pleasant, well appearing female.  ABDOMEN: Soft, nondistended no hepatosplenomegaly. No palpable masses. Diffusely tender to palpation. Moderately sized ventral hernia.          Assessment & Plan     Rheumatoid arthritis, involving unspecified site, unspecified rheumatoid factor presence  Stable. Refilled medication.    - oxyCODONE (ROXICODONE) 5 MG tablet; Take 1 tablet (5 mg) by mouth every 8 hours as needed for pain Max 3 tabs per day.    Pain syndrome, chronic  Stable. Refilled medication.    - oxyCODONE (ROXICODONE) 5 MG tablet; Take 1 tablet (5 mg) by mouth every 8 hours as needed for pain Max 3 tabs per day.    Ulcerative colitis with complication, unspecified location (H)  History of UC. Given ongoing diarrhea and abdominal pain I suspect a flare of this. Will check labs as below but advised she follow-up with GI at [facility] who has followed her for this.  - Enteric Bacteria and Virus Panel by [facility] Stool; Future  - Cryptosporidium/Giardia Immunoassay; Future  - Clostridium difficile Toxin B PCR; Future  - CBC with platelets differential  - Erythrocyte sedimentation " "rate auto  - CRP, inflammation  - GASTROENTEROLOGY ADULT REF CONSULT ONLY; Future    Rectus diastasis  Discussed benign nature of this. Discussed issues with surgery. Given largely cosmetic nature of this I would not recommend surgical treatment especially in light of all the current Gi issues.       BMI:   Estimated body mass index is 44.6 kg/m  as calculated from the following:    Height as of this encounter: 1.588 m (5' 2.5\").    Weight as of this encounter: 112.4 kg (247 lb 12.8 oz).         No follow-ups on file.    Myron Harris MD  [facility] [place]    "

## 2020-09-24 DIAGNOSIS — K51.919 ULCERATIVE COLITIS WITH COMPLICATION, UNSPECIFIED LOCATION (H): ICD-10-CM

## 2020-09-24 LAB
C COLI+JEJUNI+LARI FUSA STL QL NAA+PROBE: NOT DETECTED
C DIFF TOX B STL QL: NEGATIVE
EC STX1 GENE STL QL NAA+PROBE: NOT DETECTED
EC STX2 GENE STL QL NAA+PROBE: NOT DETECTED
ENTERIC PATHOGEN COMMENT: NORMAL
NOROV GI+II ORF1-ORF2 JNC STL QL NAA+PR: NOT DETECTED
RVA NSP5 STL QL NAA+PROBE: NOT DETECTED
SALMONELLA SP RPOD STL QL NAA+PROBE: NOT DETECTED
SHIGELLA SP+EIEC IPAH STL QL NAA+PROBE: NOT DETECTED
SPECIMEN SOURCE: NORMAL
V CHOL+PARA RFBL+TRKH+TNAA STL QL NAA+PR: NOT DETECTED
Y ENTERO RECN STL QL NAA+PROBE: NOT DETECTED

## 2020-09-25 LAB
C PARVUM AG STL QL IA: NEGATIVE
G LAMBLIA AG STL QL IA: NEGATIVE
SPECIMEN SOURCE: NORMAL

## 2020-09-25 NOTE — RESULT ENCOUNTER NOTE
Notified via [employer]: Still awaiting one result but stool cultures thus far negative. I suspect this is your ulcerative colitis flaring.

## 2020-11-29 ENCOUNTER — HEALTH MAINTENANCE LETTER (OUTPATIENT)
Age: 45
End: 2020-11-29

## 2020-12-13 DIAGNOSIS — G47.00 PERSISTENT INSOMNIA: ICD-10-CM

## 2020-12-14 ENCOUNTER — TELEPHONE (OUTPATIENT)
Dept: FAMILY MEDICINE | Facility: CLINIC | Age: 45
End: 2020-12-14

## 2020-12-14 NOTE — TELEPHONE ENCOUNTER
"Requested Prescriptions   Pending Prescriptions Disp Refills     hydrOXYzine (ATARAX) 25 MG tablet [Pharmacy Med Name: hydrOXYzine HCL 25MG TAB] 90 tablet 1     Sig: TAKE ONE TABLET BY MOUTH AT BEDTIME       Antihistamines Protocol Passed - 12/13/2020  5:01 AM        Passed - Recent (12 mo) or future (30 days) visit within the authorizing provider's specialty     Patient has had an office visit with the authorizing provider or a provider within the authorizing providers department within the previous 12 mos or has a future within next 30 days. See \"Patient Info\" tab in inbasket, or \"Choose Columns\" in Meds & Orders section of the refill encounter.              Passed - Patient is age 3 or older     Apply age and/or weight-based dosing for peds patients age 3 and older.    Forward request to provider for patients under the age of 3.          Passed - Medication is active on med list             "

## 2020-12-14 NOTE — TELEPHONE ENCOUNTER
Reason for Call:  Form, our goal is to have forms completed with 72 hours, however, some forms may require a visit or additional information.    Type of letter, form or note:  FMLA    Who is the form from?: Patient    Where did the form come from: form was faxed in    What clinic location was the form placed at?: [facility]    Where the form was placed: Form's  Box/Folder    What number is listed as a contact on the form?:        Additional comments:     Call taken on 12/14/2020 at 11:24 AM by Jena Tony

## 2020-12-16 RX ORDER — HYDROXYZINE HYDROCHLORIDE 25 MG/1
TABLET, FILM COATED ORAL
Qty: 90 TABLET | Refills: 1 | Status: ON HOLD | OUTPATIENT
Start: 2020-12-16 | End: 2021-06-09

## 2020-12-18 ENCOUNTER — VIRTUAL VISIT (OUTPATIENT)
Dept: FAMILY MEDICINE | Facility: CLINIC | Age: 45
End: 2020-12-18
Payer: COMMERCIAL

## 2020-12-18 ENCOUNTER — TELEPHONE (OUTPATIENT)
Dept: FAMILY MEDICINE | Facility: CLINIC | Age: 45
End: 2020-12-18

## 2020-12-18 DIAGNOSIS — G89.4 PAIN SYNDROME, CHRONIC: Primary | ICD-10-CM

## 2020-12-18 DIAGNOSIS — E13.9 DIABETES MELLITUS, IATROGENIC (H): Chronic | ICD-10-CM

## 2020-12-18 DIAGNOSIS — C77.3 SECONDARY AND UNSPECIFIED MALIGNANT NEOPLASM OF AXILLA AND UPPER LIMB LYMPH NODES (H): ICD-10-CM

## 2020-12-18 DIAGNOSIS — C43.62 MALIGNANT MELANOMA OF LEFT UPPER EXTREMITY INCLUDING SHOULDER (H): Chronic | ICD-10-CM

## 2020-12-18 PROCEDURE — 99213 OFFICE O/P EST LOW 20 MIN: CPT | Mod: TEL | Performed by: FAMILY MEDICINE

## 2020-12-18 NOTE — TELEPHONE ENCOUNTER
Form completed and emailed to patient. Original is in todays paper work and copy was sent to scanning.     Chantel Emmanuel MA

## 2020-12-18 NOTE — PROGRESS NOTES
"  Doretha Fernandez is a 45 year old female who is being evaluated via a billable telephone visit.      The patient has been notified of following:     \"This telephone visit will be conducted via a call between you and your physician/provider. We have found that certain health care needs can be provided without the need for a physical exam.  This service lets us provide the care you need with a short phone conversation.  If a prescription is necessary we can send it directly to your pharmacy.  If lab work is needed we can place an order for that and you can then stop by our lab to have the test done at a later time.    Telephone visits are billed at different rates depending on your insurance coverage. During this emergency period, for some insurers they may be billed the same as an in-person visit.  Please reach out to your insurance provider with any questions.    If during the course of the call the physician/provider feels a telephone visit is not appropriate, you will not be charged for this service.\"    Patient has given verbal consent for Telephone visit?  Yes    What phone number would you like to be contacted at? [phone]    How would you like to obtain your AVS? Eli Lucas     Doretha Fernandez is a 45 year old female who presents via phone visit today for the following health issues:    HPI   Chief Complaint   Patient presents with     Forms     Patient would like to discuss [facility] paperwork.         patient with complicated past history:    1.  Metastatic malignant melanoma of unknown primary; resected, currently in complete remission     2.  Adjuvant Immunotherapy induced severe colitis, and arthritis requiring active therapy; currently on moderate doses of prednisone with ongoing issues with joint-pain syndrome (multifactorial etiology).     3. Chronic pain syndrome centered on disabling joint pain; s/p injection of R retrocalcaneal bursa with significant improvement of symptoms " "(11/13/2020)     4. Type 2 diabetes, steroid induced      Patient has been back to work, full time.  She's been  Away from the general public working in a room by herself.  Continues to need multiple specialists (oncology, derm, endocrinology, podiatry, rheumatology, etc) and has appointments necessary.  Also has been seeing podiatry and will need PT for her achilles tendonitis.     She was told NOT to get the COVID vaccine due to her autoimmune reaction to nivolumab.         Review of Systems   Constitutional, HEENT, cardiovascular, pulmonary, gi and gu systems are negative, except as otherwise noted.       Objective          Vitals:  No vitals were obtained today due to virtual visit.    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    Weight as of 9/23/20: 112.4 kg (247 lb 12.8 oz).                No follow-ups on file.    Anna Naidu MD  [facility]    Phone call duration:  8 minutes                "

## 2020-12-18 NOTE — PATIENT INSTRUCTIONS
Our Clinic hours are:  Mondays    7:20 am - 7 pm  Tues -  Fri  7:20 am - 5 pm    Clinic Phone: [phone]    The clinic lab opens at 7:30 am Mon - Fri and appointments are required.    [facility]. [phone]  Monday  8 am - 7pm  Tues - Fri 8 am - 5:30 pm

## 2021-01-10 DIAGNOSIS — E55.9 VITAMIN D DEFICIENCY: ICD-10-CM

## 2021-01-11 RX ORDER — CHOLECALCIFEROL (VITAMIN D3) 25 MCG
TABLET ORAL
Qty: 300 TABLET | Refills: 1 | Status: SHIPPED | OUTPATIENT
Start: 2021-01-11 | End: 2021-10-12

## 2021-01-11 NOTE — TELEPHONE ENCOUNTER
"Requested Prescriptions   Pending Prescriptions Disp Refills     VITAMIN D3 25 MCG (1000 UT) tablet [Pharmacy Med Name: VITAMIN D3 25 MCG(1000 UT) TABS] 300 tablet 1     Sig: TAKE THREE TABLETS BY MOUTH ONCE DAILY       Vitamin Supplements (Adult) Protocol Passed - 1/10/2021  5:03 AM        Passed - High dose Vitamin D not ordered        Passed - Recent (12 mo) or future (30 days) visit within the authorizing provider's specialty     Patient has had an office visit with the authorizing provider or a provider within the authorizing providers department within the previous 12 mos or has a future within next 30 days. See \"Patient Info\" tab in inbasket, or \"Choose Columns\" in Meds & Orders section of the refill encounter.              Passed - Medication is active on med list             "

## 2021-01-13 ENCOUNTER — TELEPHONE (OUTPATIENT)
Dept: FAMILY MEDICINE | Facility: CLINIC | Age: 46
End: 2021-01-13

## 2021-01-13 NOTE — TELEPHONE ENCOUNTER
Patient Quality Outreach      Summary:    Patient has the following on her problem list/HM:   Depression / Dysthymia review         PHQ-9 SCORE 10/31/2019 3/18/2020 2020   PHQ-9 Total Score - - -   PHQ-9 Total Score MyChart 18 (Moderately severe depression) - -   PHQ-9 Total Score 18 9 18       If PHQ-9 recheck is 5 or more, route to provider for next steps.    Asthma review       ACT Total Scores 2020   ACT TOTAL SCORE -   ASTHMA ER VISITS -   ASTHMA HOSPITALIZATIONS -   ACT TOTAL SCORE (Goal Greater than or Equal to 20) 14   In the past 12 months, how many times did you visit the emergency room for your asthma without being admitted to the hospital? 0   In the past 12 months, how many times were you hospitalized overnight because of your asthma? 0          Diabetes    Last A1C:  Lab Results   Component Value Date    A1C 6.3 2020    A1C 6.6 2020       Last LDL:    Lab Results   Component Value Date    LDL  2020     Cannot estimate LDL when triglyceride exceeds 400 mg/dL     2020       Is the patient on a Statin? Yes          Is the patient on Aspirin? No    Medications     HMG CoA Reductase Inhibitors     rosuvastatin (CRESTOR) 5 MG tablet             Last three blood pressure readings:  BP Readings from Last 3 Encounters:   20 (!) 137/94   20 109/75   20 132/80            Tobacco Use      Smoking status: Former Smoker        Packs/day: 1.00        Years: 5.00        Pack years: 5        Quit date: 3/20/1998        Years since quittin.8      Smokeless tobacco: Never Used          Patient is due/failing the following:   ACT needed, BP check, Breast Cancer Screening - Mammogram and Annual wellness, date due: pap is due    Type of outreach:    Sent [employer] message.    Questions for provider review:    None                                                                                                                                     Chantel VELIZ  JOE Emmanuel       Chart routed to Care Team.

## 2021-02-08 ENCOUNTER — OFFICE VISIT (OUTPATIENT)
Dept: DERMATOLOGY | Facility: CLINIC | Age: 46
End: 2021-02-08
Payer: COMMERCIAL

## 2021-02-08 VITALS — DIASTOLIC BLOOD PRESSURE: 78 MMHG | HEART RATE: 81 BPM | SYSTOLIC BLOOD PRESSURE: 119 MMHG

## 2021-02-08 DIAGNOSIS — L91.8 SKIN TAG: ICD-10-CM

## 2021-02-08 DIAGNOSIS — D18.01 HEMANGIOMA OF SKIN: ICD-10-CM

## 2021-02-08 DIAGNOSIS — Z85.820 HISTORY OF MELANOMA: ICD-10-CM

## 2021-02-08 DIAGNOSIS — L82.1 SEBORRHEIC KERATOSIS: Primary | ICD-10-CM

## 2021-02-08 DIAGNOSIS — L81.4 LENTIGO: ICD-10-CM

## 2021-02-08 DIAGNOSIS — D23.9 DERMAL NEVUS: ICD-10-CM

## 2021-02-08 PROCEDURE — 99213 OFFICE O/P EST LOW 20 MIN: CPT | Mod: 25 | Performed by: DERMATOLOGY

## 2021-02-08 PROCEDURE — 11200 RMVL SKIN TAGS UP TO&INC 15: CPT | Performed by: DERMATOLOGY

## 2021-02-08 NOTE — PROGRESS NOTES
Doretha Fernandez is an extremely pleasant 45 year old year old female patient  here today for f/u h xof melanoma unknown primary.  She went to Dr. Cool for node dissection.1 out of 20 nodes positive.  No adjuvant Radiation.  of brain and PET scan both clear.  Was on  Nivolumab got 8 injection but got colitis.  She went to [facility].   She was treated with high dose steroids for colitis and had a dx of bacterial pneumonia.   She was admitted for to hospital for this. She is followed by GI She is off of melanoma meds.  She was also dx with seronegative rheumatoid arthritis and was on humira. Recently dx with achilles tendonitis.  Scans havebeen clear. She is 3 years out!!!.  She notes s kin tag on left eyelid today.  It is itching.  Patient reports the following modifying factors none.  Associated symptoms: none.  Patient has no other skin complaints today.  Remainder of the HPI, Meds, PMH, Allergies, FH, and SH was reviewed in chart.      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      Anxiety      Basal cell carcinoma      Cervical high risk HPV (human papillomavirus) test positive 2019    See problem list     Colitis      Depression      Diabetes mellitus, iatrogenic (H) 2020     Inflammatory arthritis      Insomnia      Intestinal giardiasis 3/5/2018     Lumbago     left lower back pain     Lymphedema      Malignant melanoma (H)      Melanoma (H) 10/23/2017     Mild persistent asthma      Obesity      STORMY (obstructive sleep apnea)      Prothrombin deficiency (H)     takes 81mg asa daily     Stroke (cerebrum) (H)     During      TIA (transient ischemic attack)      Type 2 diabetes mellitus (H)        Past Surgical History:   Procedure Laterality Date     APPENDECTOMY       COLONOSCOPY N/A 10/18/2017    Procedure: COLONOSCOPY;  Colon;  Surgeon: Debbie Stephens MD;  Location: [facility] OR     COLONOSCOPY N/A 3/9/2018     Procedure: COMBINED COLONOSCOPY, SINGLE OR MULTIPLE BIOPSY/POLYPECTOMY BY BIOPSY;  colon;  Surgeon: Benita Schumacher MD;  Location: [facility] GI     COLONOSCOPY      multiple since 2018 to present - about 6 total     DISSECT LYMPH NODE AXILLA Left 10/23/2017    Procedure: DISSECT LYMPH NODE AXILLA;  Left Axillary Lymph Node Dissection ;  Surgeon: Laurent Cool MD;  Location: [facility] OR     EXAM UNDER ANESTHESIA PELVIC N/A 2020    Procedure: EXAM UNDER ANESTHESIA, PELVIS; with Cervical Biopsies, Vaginal Biopsy and Endocervical Curettings;  Surgeon: Melina Jung MD;  Location: [facility] OR     GYN SURGERY  ,          REPAIR MOHS Left 2017    Procedure: REPAIR MOHS;  Left Upper Lid Moh's Reconstruction;  Surgeon: Kisha Bosch MD;  Location: [facility] OR        Family History   Problem Relation Age of Onset     Cancer Mother 45        lung     Neurologic Disorder Mother         epilepsy     Lipids Father      Gastrointestinal Disease Father         diverticulitis      Depression Father      Cancer Maternal Grandmother      Blood Disease Maternal Grandmother         lymphoma      Arthritis Maternal Grandmother      Diabetes Maternal Grandmother      Depression Maternal Grandmother      Macular Degeneration Maternal Grandmother      Glaucoma Maternal Grandmother      Diabetes Maternal Grandfather      Cerebrovascular Disease Maternal Grandfather      Blood Disease Maternal Grandfather      Heart Disease Maternal Grandfather      Glaucoma Maternal Grandfather      Cancer Paternal Grandmother      Cancer - colorectal Paternal Grandmother      Respiratory Paternal Grandfather         emphysema      Heart Disease Daughter      Asthma Daughter      Depression Sister      Melanoma No family hx of        Social History     Socioeconomic History     Marital status:      Spouse name: Not on file     Number of children: Not on file     Years of education: Not on file     Highest education level: Not on file    Occupational History     Not on file   Social Needs     Financial resource strain: Not on file     Food insecurity     Worry: Not on file     Inability: Not on file     Transportation needs     Medical: Not on file     Non-medical: Not on file   Tobacco Use     Smoking status: Former Smoker     Packs/day: 1.00     Years: 5.00     Pack years: 5.00     Quit date: 3/20/1998     Years since quittin.9     Smokeless tobacco: Never Used   Substance and Sexual Activity     Alcohol use: Yes     Comment: occ     Drug use: No     Sexual activity: Not Currently     Partners: Male     Birth control/protection: Surgical   Lifestyle     Physical activity     Days per week: Not on file     Minutes per session: Not on file     Stress: Not on file   Relationships     Social connections     Talks on phone: Not on file     Gets together: Not on file     Attends Zoroastrian service: Not on file     Active member of club or organization: Not on file     Attends meetings of clubs or organizations: Not on file     Relationship status: Not on file     Intimate partner violence     Fear of current or ex partner: Not on file     Emotionally abused: Not on file     Physically abused: Not on file     Forced sexual activity: Not on file   Other Topics Concern     Parent/sibling w/ CABG, MI or angioplasty before 65F 55M? No   Social History Narrative    20 y.o- patient's mother   of lung cancer. She had to take care of her younger sister.    2012- patient's  had a heart attack with stents placed, followed by cardiac rehabilitation    2000 TO 2012  was in [facility] for depression    2013 patient's  went through alcohol rehabilitation at [facility]            They have attended couple counseling a couple of times and patient went to the family program for chemical dependency.    Patient denies alcohol or drug use and herself            Has 2 children,  girls ages 10 and 13     For a while she was working 3 jobs since her  was ill. Works at the [employer] for [employer]. Reports her job is very stressful.           Outpatient Encounter Medications as of 2/8/2021   Medication Sig Dispense Refill     Acetaminophen (TYLENOL PO) Take 1,000 mg by mouth every 8 hours as needed for mild pain or fever (Pt last dose 02/10/20)        blood glucose (ACCU-CHEK GUIDE) test strip 1 strip by In Vitro route 2 times daily Use to test blood sugar 2 times daily or as directed. 200 strip 11     blood glucose monitoring (ACCU-CHEK FASTCLIX) lancets 1 each by In Vitro route 2 times daily Use to test blood sugar 2 times daily or as directed.  Ok to substitute alternative if insurance prefers. 102 each 11     Calcium Carbonate (CALCIUM-CARB 600 PO) Take 1 tablet by mouth as needed (Pt last took 1 week ago 2/10/20)        ferrous fumarate 65 mg, [place]. FE,-Vitamin C 125 mg (VITRON-C)  MG TABS tablet Take 1 tablet by mouth daily 30 tablet 3     gabapentin (NEURONTIN) 300 MG capsule Take 3 capsules (900 mg) by mouth 4 times daily 360 capsule 5     GOODSENSE ARTHRITIS PAIN 650 MG CR tablet        hydrOXYzine (ATARAX) 25 MG tablet TAKE ONE TABLET BY MOUTH AT BEDTIME (Patient taking differently: As needed) 90 tablet 1     metFORMIN (GLUCOPHAGE-XR) 500 MG 24 hr tablet Take 2 tablets (1,000 mg) by mouth daily (with dinner) (Patient taking differently: Take 500 mg by mouth 2 times daily Transitioned to this schedule 1 week ago 2/10/20) 360 tablet 1     NEW MED actmera infusions monthy       predniSONE (DELTASONE) 5 MG tablet Take 10 mg by mouth       rosuvastatin (CRESTOR) 5 MG tablet Take 1 tablet (5 mg) by mouth daily 90 tablet 3     venlafaxine (EFFEXOR-ER) 225 MG 24 hr tablet Take 1 tablet (225 mg) by mouth daily 90 tablet 1     VITAMIN D3 25 MCG (1000 UT) tablet TAKE THREE TABLETS BY MOUTH ONCE DAILY 300 tablet 1     Calcium Carb-Cholecalciferol 600-800 MG-UNIT TABS  Take 800 mg by mouth daily before breakfast       ondansetron (ZOFRAN) 8 MG tablet Take 8 mg by mouth every 8 hours as needed for nausea (Pt has not taken in past year 2/10/20)        [DISCONTINUED] ferrous fumarate 65 mg, [place]. FE,-Vitamin C 125 mg (VITRON-C)  MG TABS tablet Take 1 tablet by mouth daily (Patient not taking: Reported on 2/8/2021) 60 tablet 1     Facility-Administered Encounter Medications as of 2/8/2021   Medication Dose Route Frequency Provider Last Rate Last Admin     lidocaine 1 % 9 mL  9 mL Intradermal Once Anna Naidu MD         sodium bicarbonate 8.4 % injection 1 mEq  1 mEq Intradermal Once Anna Naidu MD                 Review Of Systems  Skin: As above  Eyes: negative  Ears/Nose/Throat: negative  Respiratory: No shortness of breath, dyspnea on exertion, cough, or hemoptysis  Cardiovascular: negative  Gastrointestinal: negative  Genitourinary: negative  Musculoskeletal: negative  Neurologic: negative  Psychiatric: negative  Hematologic/Lymphatic/Immunologic: negative  Endocrine: negative      O:   NAD, WDWN, Alert & Oriented, Mood & Affect wnl, Vitals stable   Here today alone   /78 (BP Location: Right arm, Patient Position: Sitting, Cuff Size: Adult Large)   Pulse 81    General appearance normal   Vitals stable   Alert, oriented and in no acute distress       Following lymph nodes palpated: Occipital, Cervical, Supraclavicular , axillary, inguinal, femoral no lad                                  Pink papules on trunk  L eyelid tag   Stuck on papules and brown macules on trunk and ext   Red papules on trunk  Flesh colored papules on trunk        The remainder of the full exam was normal; the following areas were examined:  conjunctiva/lids, oral mucosa, neck, peripheral vascular system, abdomen, lymph nodes, digits/nails, eccrine and apocrine glands, scalp/hair, face, neck, chest, abdomen, buttocks, back, RUE, LUE, RLE, LLE       Eyes: Conjunctivae/lids:Normal     ENT:  Lips, buccal mucosa, tongue: normal    MSK:Normal    Cardiovascular: peripheral edema none    Pulm: Breathing Normal    Lymph Nodes: No Head and Neck Lymphadenopathy     Neuro/Psych: Orientation:Alert and Orientedx3 ; Mood/Affect:normal       A/P:  1. l eyelid tag  TANGENTIAL EXCISION:  After consent, anesthesia with LEC and prep, tangential excision performed.  No complications and routine wound care.    2.1. Seborrheic keratosis, lentigo, angioma, dermal nevus, hx if non-melanoma skin cancer, hx of metastatic melanoma  MELANOMA DISCUSSED WITH PATIENT:  I discussed the specifics of tumor, prognosis, metachronous melanoma, self exam, and genetics with the patient. I explained the need for monthly skin exams including and taught the patient how to do this. Patient was asked about new or changing moles . I discussed with patient signs and symptoms that could arise in the setting of recurrent locoregional or metastatic disease. In addition, the need to undergo every 6 month dermatologic full skin survey and evaluation given that patients with a diagnosis of melanoma are at risk of recurrence (local and distant) and of subsequent de bhumi melanoma  It was a pleasure speaking to Doretha Fernandez today.  BENIGN LESIONS DISCUSSED WITH PATIENT:  I discussed the specifics of tumor, prognosis, and genetics of benign lesions.  I explained that treatment of these lesions would be purely cosmetic and not medically neccessary.  I discussed with patient different removal options including excision, cautery and /or laser.      Nature and genetics of benign skin lesions dicussed with patient.  Signs and Symptoms of skin cancer discussed with patient.  Patient encouraged to perform monthly skin exams.  UV precautions reviewed with patient.  Risks of non-melanoma skin cancer discussed with patient   Return to clinic 6 months

## 2021-02-08 NOTE — LETTER
"    2/8/2021         RE: Doretha Fernandez  [address]  [place] [address]        Dear Colleague,    Thank you for referring your patient, Doretha Fernandez, to the [facility]. Please see a copy of my visit note below.    Initial There were no vitals taken for this visit. Estimated body mass index is 44.6 kg/m  as calculated from the following:    Height as of 9/23/20: 1.588 m (5' 2.5\").    Weight as of 9/23/20: 112.4 kg (247 lb 12.8 oz). .    Sarah Calvo MA on 2/8/2021 at 9:26 AM      Doretha Fernandez is an extremely pleasant 45 year old year old female patient  here today for f/u h xof melanoma unknown primary.  She went to Dr. Cool for node dissection.1 out of 20 nodes positive.  No adjuvant Radiation.  of brain and PET scan both clear.  Was on  Nivolumab got 8 injection but got colitis.  She went to [facility].   She was treated with high dose steroids for colitis and had a dx of bacterial pneumonia.   She was admitted for to hospital for this. She is followed by GI She is off of melanoma meds.  She was also dx with seronegative rheumatoid arthritis and was on humira. Recently dx with achilles tendonitis.  Scans havebeen clear. She is 3 years out!!!.  She notes s kin tag on left eyelid today.  It is itching.  Patient reports the following modifying factors none.  Associated symptoms: none.  Patient has no other skin complaints today.  Remainder of the HPI, Meds, PMH, Allergies, FH, and SH was reviewed in chart.      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      Anxiety      Basal cell carcinoma      Cervical high risk HPV (human papillomavirus) test positive 12/13/2019    See problem list     Colitis      Depression      Diabetes mellitus, iatrogenic (H) 1/28/2020     Inflammatory arthritis      Insomnia      Intestinal giardiasis 3/5/2018     Lumbago     left lower back pain     Lymphedema      Malignant melanoma " (H)      Melanoma (H) 10/23/2017     Mild persistent asthma      Obesity      STORMY (obstructive sleep apnea)      Prothrombin deficiency (H)     takes 81mg asa daily     Stroke (cerebrum) (H)     During      TIA (transient ischemic attack)      Type 2 diabetes mellitus (H)        Past Surgical History:   Procedure Laterality Date     APPENDECTOMY       COLONOSCOPY N/A 10/18/2017    Procedure: COLONOSCOPY;  Colon;  Surgeon: Debbie Stephens MD;  [facility]     COLONOSCOPY N/A 3/9/2018    Procedure: COMBINED COLONOSCOPY, SINGLE OR MULTIPLE BIOPSY/POLYPECTOMY BY BIOPSY;  colon;  Surgeon: Benita Schumacher MD;  Location: [facility] GI     COLONOSCOPY      multiple since  to present - about 6 total     DISSECT LYMPH NODE AXILLA Left 10/23/2017    Procedure: DISSECT LYMPH NODE AXILLA;  Left Axillary Lymph Node Dissection ;  Surgeon: Laurent Cool MD;  Location: [facility] OR     EXAM UNDER ANESTHESIA PELVIC N/A 2020    Procedure: EXAM UNDER ANESTHESIA, PELVIS; with Cervical Biopsies, Vaginal Biopsy and Endocervical Curettings;  Surgeon: Melina Jung MD;  Location: [facility] OR     GYN SURGERY  ,          REPAIR MOHS Left 2017    Procedure: REPAIR MOHS;  Left Upper Lid Moh's Reconstruction;  Surgeon: Kisha Bosch MD;  [facility]        Family History   Problem Relation Age of Onset     Cancer Mother 45        lung     Neurologic Disorder Mother         epilepsy     Lipids Father      Gastrointestinal Disease Father         diverticulitis      Depression Father      Cancer Maternal Grandmother      Blood Disease Maternal Grandmother         lymphoma      Arthritis Maternal Grandmother      Diabetes Maternal Grandmother      Depression Maternal Grandmother      Macular Degeneration Maternal Grandmother      Glaucoma Maternal Grandmother      Diabetes Maternal Grandfather      Cerebrovascular Disease Maternal Grandfather      Blood Disease Maternal Grandfather      Heart Disease  Maternal Grandfather      Glaucoma Maternal Grandfather      Cancer Paternal Grandmother      Cancer - colorectal Paternal Grandmother      Respiratory Paternal Grandfather         emphysema      Heart Disease Daughter      Asthma Daughter      Depression Sister      Melanoma No family hx of        Social History     Socioeconomic History     Marital status:      Spouse name: Not on file     Number of children: Not on file     Years of education: Not on file     Highest education level: Not on file   Occupational History     Not on file   Social Needs     Financial resource strain: Not on file     Food insecurity     Worry: Not on file     Inability: Not on file     Transportation needs     Medical: Not on file     Non-medical: Not on file   Tobacco Use     Smoking status: Former Smoker     Packs/day: 1.00     Years: 5.00     Pack years: 5.00     Quit date: 3/20/1998     Years since quittin.9     Smokeless tobacco: Never Used   Substance and Sexual Activity     Alcohol use: Yes     Comment: occ     Drug use: No     Sexual activity: Not Currently     Partners: Male     Birth control/protection: Surgical   Lifestyle     Physical activity     Days per week: Not on file     Minutes per session: Not on file     Stress: Not on file   Relationships     Social connections     Talks on phone: Not on file     Gets together: Not on file     Attends Adventism service: Not on file     Active member of club or organization: Not on file     Attends meetings of clubs or organizations: Not on file     Relationship status: Not on file     Intimate partner violence     Fear of current or ex partner: Not on file     Emotionally abused: Not on file     Physically abused: Not on file     Forced sexual activity: Not on file   Other Topics Concern     Parent/sibling w/ CABG, MI or angioplasty before 65F 55M? No   Social History Narrative    20 y.o- patient's mother   of lung cancer. She had to take care of her younger  sister.    7/2012- patient's  had a heart attack with stents placed, followed by cardiac rehabilitation    November 2000 TO December 2012  was in [facility] for depression    January 2013 patient's  went through alcohol rehabilitation at [facility]            They have attended couple counseling a couple of times and patient went to the family program for chemical dependency.    Patient denies alcohol or drug use and herself            Has 2 children, girls ages 10 and 13     For a while she was working 3 jobs since her  was ill. Works at the [employer] for [employer]. Reports her job is very stressful.           Outpatient Encounter Medications as of 2/8/2021   Medication Sig Dispense Refill     Acetaminophen (TYLENOL PO) Take 1,000 mg by mouth every 8 hours as needed for mild pain or fever (Pt last dose 02/10/20)        blood glucose (ACCU-CHEK GUIDE) test strip 1 strip by In Vitro route 2 times daily Use to test blood sugar 2 times daily or as directed. 200 strip 11     blood glucose monitoring (ACCU-CHEK FASTCLIX) lancets 1 each by In Vitro route 2 times daily Use to test blood sugar 2 times daily or as directed.  Ok to substitute alternative if insurance prefers. 102 each 11     Calcium Carbonate (CALCIUM-CARB 600 PO) Take 1 tablet by mouth as needed (Pt last took 1 week ago 2/10/20)        ferrous fumarate 65 mg, [place]. FE,-Vitamin C 125 mg (VITRON-C)  MG TABS tablet Take 1 tablet by mouth daily 30 tablet 3     gabapentin (NEURONTIN) 300 MG capsule Take 3 capsules (900 mg) by mouth 4 times daily 360 capsule 5     GOODSENSE ARTHRITIS PAIN 650 MG CR tablet        hydrOXYzine (ATARAX) 25 MG tablet TAKE ONE TABLET BY MOUTH AT BEDTIME (Patient taking differently: As needed) 90 tablet 1     metFORMIN (GLUCOPHAGE-XR) 500 MG 24 hr tablet Take 2 tablets (1,000 mg) by mouth daily (with dinner) (Patient taking differently: Take 500 mg by  mouth 2 times daily Transitioned to this schedule 1 week ago 2/10/20) 360 tablet 1     NEW MED actmera infusions monthy       predniSONE (DELTASONE) 5 MG tablet Take 10 mg by mouth       rosuvastatin (CRESTOR) 5 MG tablet Take 1 tablet (5 mg) by mouth daily 90 tablet 3     venlafaxine (EFFEXOR-ER) 225 MG 24 hr tablet Take 1 tablet (225 mg) by mouth daily 90 tablet 1     VITAMIN D3 25 MCG (1000 UT) tablet TAKE THREE TABLETS BY MOUTH ONCE DAILY 300 tablet 1     Calcium Carb-Cholecalciferol 600-800 MG-UNIT TABS Take 800 mg by mouth daily before breakfast       ondansetron (ZOFRAN) 8 MG tablet Take 8 mg by mouth every 8 hours as needed for nausea (Pt has not taken in past year 2/10/20)        [DISCONTINUED] ferrous fumarate 65 mg, [place]. FE,-Vitamin C 125 mg (VITRON-C)  MG TABS tablet Take 1 tablet by mouth daily (Patient not taking: Reported on 2/8/2021) 60 tablet 1     Facility-Administered Encounter Medications as of 2/8/2021   Medication Dose Route Frequency Provider Last Rate Last Admin     lidocaine 1 % 9 mL  9 mL Intradermal Once Anna Naidu MD         sodium bicarbonate 8.4 % injection 1 mEq  1 mEq Intradermal Once Anna Naidu MD                 Review Of Systems  Skin: As above  Eyes: negative  Ears/Nose/Throat: negative  Respiratory: No shortness of breath, dyspnea on exertion, cough, or hemoptysis  Cardiovascular: negative  Gastrointestinal: negative  Genitourinary: negative  Musculoskeletal: negative  Neurologic: negative  Psychiatric: negative  Hematologic/Lymphatic/Immunologic: negative  Endocrine: negative      O:   NAD, WDWN, Alert & Oriented, Mood & Affect wnl, Vitals stable   Here today alone   /78 (BP Location: Right arm, Patient Position: Sitting, Cuff Size: Adult Large)   Pulse 81    General appearance normal   Vitals stable   Alert, oriented and in no acute distress       Following lymph nodes palpated: Occipital, Cervical, Supraclavicular , axillary, inguinal, femoral no  lad                                  Pink papules on trunk  L eyelid tag   Stuck on papules and brown macules on trunk and ext   Red papules on trunk  Flesh colored papules on trunk        The remainder of the full exam was normal; the following areas were examined:  conjunctiva/lids, oral mucosa, neck, peripheral vascular system, abdomen, lymph nodes, digits/nails, eccrine and apocrine glands, scalp/hair, face, neck, chest, abdomen, buttocks, back, RUE, LUE, RLE, LLE       Eyes: Conjunctivae/lids:Normal     ENT: Lips, buccal mucosa, tongue: normal    MSK:Normal    Cardiovascular: peripheral edema none    Pulm: Breathing Normal    Lymph Nodes: No Head and Neck Lymphadenopathy     Neuro/Psych: Orientation:Alert and Orientedx3 ; Mood/Affect:normal       A/P:  1. l eyelid tag  TANGENTIAL EXCISION:  After consent, anesthesia with LEC and prep, tangential excision performed.  No complications and routine wound care.    2.1. Seborrheic keratosis, lentigo, angioma, dermal nevus, hx if non-melanoma skin cancer, hx of metastatic melanoma  MELANOMA DISCUSSED WITH PATIENT:  I discussed the specifics of tumor, prognosis, metachronous melanoma, self exam, and genetics with the patient. I explained the need for monthly skin exams including and taught the patient how to do this. Patient was asked about new or changing moles . I discussed with patient signs and symptoms that could arise in the setting of recurrent locoregional or metastatic disease. In addition, the need to undergo every 6 month dermatologic full skin survey and evaluation given that patients with a diagnosis of melanoma are at risk of recurrence (local and distant) and of subsequent de bhumi melanoma  It was a pleasure speaking to Doretha Fernandez today.  BENIGN LESIONS DISCUSSED WITH PATIENT:  I discussed the specifics of tumor, prognosis, and genetics of benign lesions.  I explained that treatment of these lesions would be purely cosmetic and not medically  neccessary.  I discussed with patient different removal options including excision, cautery and /or laser.      Nature and genetics of benign skin lesions dicussed with patient.  Signs and Symptoms of skin cancer discussed with patient.  Patient encouraged to perform monthly skin exams.  UV precautions reviewed with patient.  Risks of non-melanoma skin cancer discussed with patient   Return to clinic 6 months        Again, thank you for allowing me to participate in the care of your patient.        Sincerely,        Lowell Bullock MD

## 2021-02-08 NOTE — PROGRESS NOTES
"Initial There were no vitals taken for this visit. Estimated body mass index is 44.6 kg/m  as calculated from the following:    Height as of 9/23/20: 1.588 m (5' 2.5\").    Weight as of 9/23/20: 112.4 kg (247 lb 12.8 oz). .    Sarah Calvo MA on 2/8/2021 at 9:26 AM    "

## 2021-02-10 ENCOUNTER — VIRTUAL VISIT (OUTPATIENT)
Dept: URGENT CARE | Facility: CLINIC | Age: 46
End: 2021-02-10
Payer: COMMERCIAL

## 2021-02-10 ENCOUNTER — OFFICE VISIT (OUTPATIENT)
Dept: URGENT CARE | Facility: URGENT CARE | Age: 46
End: 2021-02-10
Attending: NURSE PRACTITIONER
Payer: COMMERCIAL

## 2021-02-10 DIAGNOSIS — Z20.822 SUSPECTED COVID-19 VIRUS INFECTION: ICD-10-CM

## 2021-02-10 DIAGNOSIS — Z20.822 SUSPECTED COVID-19 VIRUS INFECTION: Primary | ICD-10-CM

## 2021-02-10 LAB
SARS-COV-2 RNA RESP QL NAA+PROBE: NORMAL
SPECIMEN SOURCE: NORMAL

## 2021-02-10 PROCEDURE — U0005 INFEC AGEN DETEC AMPLI PROBE: HCPCS | Performed by: NURSE PRACTITIONER

## 2021-02-10 PROCEDURE — 99212 OFFICE O/P EST SF 10 MIN: CPT | Mod: TEL | Performed by: NURSE PRACTITIONER

## 2021-02-10 PROCEDURE — 99207 PR NO CHARGE LOS: CPT

## 2021-02-10 PROCEDURE — U0003 INFECTIOUS AGENT DETECTION BY NUCLEIC ACID (DNA OR RNA); SEVERE ACUTE RESPIRATORY SYNDROME CORONAVIRUS 2 (SARS-COV-2) (CORONAVIRUS DISEASE [COVID-19]), AMPLIFIED PROBE TECHNIQUE, MAKING USE OF HIGH THROUGHPUT TECHNOLOGIES AS DESCRIBED BY CMS-2020-01-R: HCPCS | Performed by: NURSE PRACTITIONER

## 2021-02-10 ASSESSMENT — ENCOUNTER SYMPTOMS
FATIGUE: 0
HEARTBURN: 0
SHORTNESS OF BREATH: 0
SINUS PAIN: 0
FEVER: 0
COUGH: 0
EYE ITCHING: 0
HEADACHES: 0
MYALGIAS: 0
ADENOPATHY: 0
SLEEP DISTURBANCE: 0
CHILLS: 0
WHEEZING: 0
DIAPHORESIS: 0
SORE THROAT: 0

## 2021-02-10 NOTE — PROGRESS NOTES
Chief Complaint   Patient presents with     Covid 19 Testing     VIRTUAL VISIT    SUBJECTIVE:  Doretha Fernandez is a 45 year old female who is requesting covid testing. Work sent her home today and is requiring a covid test to return. Daughter has a febrile URI and there is suspicion for covid.    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      Anxiety      Basal cell carcinoma      Cervical high risk HPV (human papillomavirus) test positive 2019    See problem list     Colitis      Depression      Diabetes mellitus, iatrogenic (H) 2020     Inflammatory arthritis      Insomnia      Intestinal giardiasis 3/5/2018     Lumbago     left lower back pain     Lymphedema      Malignant melanoma (H)      Melanoma (H) 10/23/2017     Mild persistent asthma      Obesity      STORMY (obstructive sleep apnea)      Prothrombin deficiency (H)     takes 81mg asa daily     Stroke (cerebrum) (H)     During      TIA (transient ischemic attack)      Type 2 diabetes mellitus (H)           Acetaminophen (TYLENOL PO), Take 1,000 mg by mouth every 8 hours as needed for mild pain or fever (Pt last dose 02/10/20)        blood glucose (ACCU-CHEK GUIDE) test strip, 1 strip by In Vitro route 2 times daily Use to test blood sugar 2 times daily or as directed.       blood glucose monitoring (ACCU-CHEK FASTCLIX) lancets, 1 each by In Vitro route 2 times daily Use to test blood sugar 2 times daily or as directed.  Ok to substitute alternative if insurance prefers.       Calcium Carb-Cholecalciferol 600-800 MG-UNIT TABS, Take 800 mg by mouth daily before breakfast       Calcium Carbonate (CALCIUM-CARB 600 PO), Take 1 tablet by mouth as needed (Pt last took 1 week ago 2/10/20)        ferrous fumarate 65 mg, [place]. FE,-Vitamin C 125 mg (VITRON-C)  MG TABS tablet, Take 1 tablet by mouth daily       gabapentin (NEURONTIN) 300 MG capsule, Take 3 capsules  (900 mg) by mouth 4 times daily       GOODSENSE ARTHRITIS PAIN 650 MG CR tablet,        hydrOXYzine (ATARAX) 25 MG tablet, TAKE ONE TABLET BY MOUTH AT BEDTIME (Patient taking differently: As needed)       metFORMIN (GLUCOPHAGE-XR) 500 MG 24 hr tablet, Take 2 tablets (1,000 mg) by mouth daily (with dinner) (Patient taking differently: Take 500 mg by mouth 2 times daily Transitioned to this schedule 1 week ago 2/10/20)       NEW MED, actmera infusions monthy       ondansetron (ZOFRAN) 8 MG tablet, Take 8 mg by mouth every 8 hours as needed for nausea (Pt has not taken in past year 2/10/20)        predniSONE (DELTASONE) 5 MG tablet, Take 10 mg by mouth       rosuvastatin (CRESTOR) 5 MG tablet, Take 1 tablet (5 mg) by mouth daily       venlafaxine (EFFEXOR-ER) 225 MG 24 hr tablet, Take 1 tablet (225 mg) by mouth daily       VITAMIN D3 25 MCG (1000 UT) tablet, TAKE THREE TABLETS BY MOUTH ONCE DAILY         lidocaine 1 % 9 mL       sodium bicarbonate 8.4 % injection 1 mEq      Social History     Tobacco Use     Smoking status: Former Smoker     Packs/day: 1.00     Years: 5.00     Pack years: 5.00     Quit date: 3/20/1998     Years since quittin.9     Smokeless tobacco: Never Used   Substance Use Topics     Alcohol use: Yes     Comment: occ     Allergies   Allergen Reactions     Bee Venom Swelling     Azithromycin Diarrhea     Erythromycin      Other reaction(s): GI intolerance, Vomiting     Fentanyl Other (See Comments)     sweating  sweating     Prochlorperazine Fatigue     Other reaction(s): Other (see comments)  Fatigue     Buspirone      Other reaction(s): GI intolerance  vomiting     Erythrocin Nausea and Vomiting     Zithromax [Azithromycin Dihydrate] Diarrhea     Enbrel [Etanercept] Hives and Rash     Review of Systems   Constitutional: Negative for chills, diaphoresis, fatigue and fever.   HENT: Negative for congestion, ear pain, postnasal drip, sinus pain and sore throat.    Eyes: Negative for itching.    Respiratory: Negative for cough, shortness of breath and wheezing.    Gastrointestinal: Negative for heartburn.   Musculoskeletal: Negative for myalgias.   Allergic/Immunologic: Negative for environmental allergies.   Neurological: Negative for headaches.   Hematological: Negative for adenopathy.   Psychiatric/Behavioral: Negative for sleep disturbance.     There were no vitals taken for this visit.    Physical Exam unable to complete virtually.    ASSESSMENT:    ICD-10-CM    1. Suspected COVID-19 virus infection  Z20.822 Asymptomatic COVID-19 Virus (Coronavirus) by PCR     PLAN:   Patient Instructions   COVID test ordered per request    Follow up with primary care provider with any problems, questions or concerns or if symptoms worsen or fail to improve. Patient agreed to plan and verbalized understanding.    SAKINA Mistry-BC  [facility]    Telephone visit 5 minutes, chart time 6 minutes.

## 2021-02-11 LAB
LABORATORY COMMENT REPORT: NORMAL
SARS-COV-2 RNA RESP QL NAA+PROBE: NEGATIVE
SPECIMEN SOURCE: NORMAL

## 2021-02-14 ENCOUNTER — HEALTH MAINTENANCE LETTER (OUTPATIENT)
Age: 46
End: 2021-02-14

## 2021-02-15 DIAGNOSIS — E13.9 DIABETES MELLITUS, IATROGENIC (H): ICD-10-CM

## 2021-02-16 NOTE — TELEPHONE ENCOUNTER
"Requested Prescriptions   Pending Prescriptions Disp Refills     blood glucose monitoring (ACCU-CHEK FASTCLIX) lancets [Pharmacy Med Name: ACCU-CHEK FASTCLIX LANCETS  MISC] 102 each 11     Sig: USE TWO TIMES A DAY OR AS DIRECTED       Diabetic Supplies Protocol Passed - 2/15/2021  5:06 PM        Passed - Medication is active on med list        Passed - Patient is 18 years of age or older        Passed - Recent (6 mo) or future (30 days) visit within the authorizing provider's specialty     Patient had office visit in the last 6 months or has a visit in the next 30 days with authorizing provider.  See \"Patient Info\" tab in inbasket, or \"Choose Columns\" in Meds & Orders section of the refill encounter.                 "

## 2021-02-18 ENCOUNTER — OFFICE VISIT (OUTPATIENT)
Dept: FAMILY MEDICINE | Facility: CLINIC | Age: 46
End: 2021-02-18
Payer: COMMERCIAL

## 2021-02-18 VITALS
OXYGEN SATURATION: 98 % | HEART RATE: 86 BPM | SYSTOLIC BLOOD PRESSURE: 118 MMHG | BODY MASS INDEX: 45.71 KG/M2 | TEMPERATURE: 97.8 F | WEIGHT: 258 LBS | HEIGHT: 63 IN | RESPIRATION RATE: 16 BRPM | DIASTOLIC BLOOD PRESSURE: 76 MMHG

## 2021-02-18 DIAGNOSIS — L71.0 PERIORAL DERMATITIS: Primary | ICD-10-CM

## 2021-02-18 DIAGNOSIS — K12.0 ORAL APHTHOUS ULCER: ICD-10-CM

## 2021-02-18 PROCEDURE — 99213 OFFICE O/P EST LOW 20 MIN: CPT | Performed by: FAMILY MEDICINE

## 2021-02-18 RX ORDER — METRONIDAZOLE 7.5 MG/G
GEL TOPICAL 2 TIMES DAILY
Qty: 45 G | Refills: 0 | Status: SHIPPED | OUTPATIENT
Start: 2021-02-18 | End: 2021-04-13

## 2021-02-18 RX ORDER — LANCETS
EACH MISCELLANEOUS
Qty: 102 EACH | Refills: 9 | Status: ON HOLD | OUTPATIENT
Start: 2021-02-18 | End: 2021-06-18

## 2021-02-18 ASSESSMENT — PATIENT HEALTH QUESTIONNAIRE - PHQ9: SUM OF ALL RESPONSES TO PHQ QUESTIONS 1-9: 4

## 2021-02-18 ASSESSMENT — PAIN SCALES - GENERAL: PAINLEVEL: NO PAIN (1)

## 2021-02-18 ASSESSMENT — MIFFLIN-ST. JEOR: SCORE: 1781.47

## 2021-02-18 NOTE — PATIENT INSTRUCTIONS
Our Clinic hours are:  Mondays    7:20 am - 7 pm  Tues -  Fri  7:20 am - 5 pm    Clinic Phone: [phone]    The clinic lab opens at 7:30 am Mon - Fri and appointments are required.    [facility]. [phone]  Monday  8 am - 7pm  Tues - Fri 8 am - 5:30 pm

## 2021-02-18 NOTE — PROGRESS NOTES
"    Assessment & Plan     Perioral dermatitis     - metroNIDAZOLE (METROGEL) 0.75 % external gel; Apply topically 2 times daily    Oral aphthous ulcer  Concern had been for thrush - clinically this is not thrush  Some submandibular lymph node enlargement likely in response to the apthous ulcer               BMI:   Estimated body mass index is 46.44 kg/m  as calculated from the following:    Height as of this encounter: 1.588 m (5' 2.5\").    Weight as of this encounter: 117 kg (258 lb).           No follow-ups on file.    Anna Naidu MD  [facility]   Doretha is a 45 year old who presents for the following health issues     HPI     Chief Complaint   Patient presents with     Mouth Problem     Patient noticed yesterday her jaw was swollen and woke up and her tongue is white and she is having some discomfort in her mouth. Patient rates pain at 1/10 patient has done water swishes.     Derm Problem     Patient is reacting to her face mask on her c-pap and breaking out around her nose.        Rash  Onset/Duration: this morning. Last week noticed a spot last week that went away.  Description  Location: on right side of nose  Character: red  Itching: no  Intensity:  mild  Progression of Symptoms:  same  Accompanying signs and symptoms:   Fever: no  Body aches or joint pain: no  Sore throat symptoms: white lining in mouth   Recent cold symptoms: no  History:           Previous episodes of similar rash: None  New exposures:  None  Recent travel: no  Exposure to similar rash: no  Precipitating or alleviating factors:   Therapies tried and outcome: none        Review of Systems   Constitutional, HEENT, cardiovascular, pulmonary, gi and gu systems are negative, except as otherwise noted.      Objective    /76   Pulse 86   Temp 97.8  F (36.6  C) (Tympanic)   Resp 16   Ht 1.588 m (5' 2.5\")   Wt 117 kg (258 lb)   SpO2 98%   Breastfeeding No   BMI 46.44 kg/m    Body mass index " is 46.44 kg/m .  Physical Exam   GENERAL APPEARANCE: healthy, alert and no distress  HENT: ear canals and TM's normal and small apthous ulcer of the left base of tongue near far molar.  No thrush  NECK: mild adenopathy (submandibular)  SKIN: bridge of nose with mild erythema.  Some perioral dermatitis of the right nasolabial fold and perioral.

## 2021-02-19 ASSESSMENT — ASTHMA QUESTIONNAIRES: ACT_TOTALSCORE: 24

## 2021-02-24 DIAGNOSIS — E13.9 DIABETES MELLITUS, IATROGENIC (H): ICD-10-CM

## 2021-02-24 NOTE — TELEPHONE ENCOUNTER
"Requested Prescriptions   Pending Prescriptions Disp Refills     ACCU-CHEK GUIDE test strip [Pharmacy Med Name: ACCU-CHEK GUIDE  STRP]  11     Sig: USE TO TEST BLOOD SUGAR TWO TIMES A DAY OR AS DIRECTED       Diabetic Supplies Protocol Failed - 2/24/2021  5:01 AM        Failed - Recent (6 mo) or future (30 days) visit within the authorizing provider's specialty     Patient had office visit in the last 6 months or has a visit in the next 30 days with authorizing provider.  See \"Patient Info\" tab in inbasket, or \"Choose Columns\" in Meds & Orders section of the refill encounter.            Passed - Medication is active on med list        Passed - Patient is 18 years of age or older             "

## 2021-02-25 RX ORDER — BLOOD SUGAR DIAGNOSTIC
STRIP MISCELLANEOUS
Qty: 200 STRIP | Refills: 0 | Status: ON HOLD | OUTPATIENT
Start: 2021-02-25 | End: 2021-06-18

## 2021-03-10 DIAGNOSIS — F41.1 ANXIETY STATE: ICD-10-CM

## 2021-03-10 DIAGNOSIS — E13.9 DIABETES MELLITUS, IATROGENIC (H): ICD-10-CM

## 2021-03-10 DIAGNOSIS — F32.0 MILD MAJOR DEPRESSION (H): ICD-10-CM

## 2021-03-10 NOTE — TELEPHONE ENCOUNTER
metFORMIN (GLUCOPHAGE-XR) 500 MG 24 hr tablet  Last Written Prescription Date:  2/7/2020  Last Fill Quantity: 360,  # refills: 1   Last office visit: 2/18/2021 with prescribing provider:  zeyad   Future Office Visit:      venlafaxine (EFFEXOR-ER) 225 MG 24 hr tablet  Last Written Prescription Date:  2/18/20  Last Fill Quantity: 90,  # refills: 1   Last office visit:  with prescribing provider:     Future Office Visit:

## 2021-03-11 RX ORDER — VENLAFAXINE HYDROCHLORIDE 225 MG/1
TABLET, EXTENDED RELEASE ORAL
Qty: 90 TABLET | Refills: 1 | Status: SHIPPED | OUTPATIENT
Start: 2021-03-11 | End: 2021-07-15

## 2021-03-11 RX ORDER — METFORMIN HCL 500 MG
TABLET, EXTENDED RELEASE 24 HR ORAL
Qty: 180 TABLET | Refills: 1 | Status: SHIPPED | OUTPATIENT
Start: 2021-03-11 | End: 2021-04-13

## 2021-03-16 DIAGNOSIS — G89.29 OTHER CHRONIC PAIN: ICD-10-CM

## 2021-03-16 RX ORDER — GABAPENTIN 300 MG/1
CAPSULE ORAL
Qty: 360 CAPSULE | Refills: 0 | Status: SHIPPED | OUTPATIENT
Start: 2021-03-16 | End: 2021-05-05

## 2021-03-16 NOTE — TELEPHONE ENCOUNTER
Routing refill request to provider for review/approval because:  Drug not on the [facility] refill protocol     Karen Andres RN

## 2021-03-17 ENCOUNTER — E-VISIT (OUTPATIENT)
Dept: FAMILY MEDICINE | Facility: CLINIC | Age: 46
End: 2021-03-17
Payer: COMMERCIAL

## 2021-03-17 ENCOUNTER — TELEPHONE (OUTPATIENT)
Dept: FAMILY MEDICINE | Facility: CLINIC | Age: 46
End: 2021-03-17

## 2021-03-17 DIAGNOSIS — Z20.822 CLOSE EXPOSURE TO 2019 NOVEL CORONAVIRUS: ICD-10-CM

## 2021-03-17 DIAGNOSIS — Z20.822 CLOSE EXPOSURE TO 2019 NOVEL CORONAVIRUS: Primary | ICD-10-CM

## 2021-03-17 LAB
SARS-COV-2 RNA RESP QL NAA+PROBE: NORMAL
SPECIMEN SOURCE: NORMAL

## 2021-03-17 PROCEDURE — 99421 OL DIG E/M SVC 5-10 MIN: CPT | Performed by: FAMILY MEDICINE

## 2021-03-17 PROCEDURE — 87635 SARS-COV-2 COVID-19 AMP PRB: CPT | Performed by: FAMILY MEDICINE

## 2021-03-17 NOTE — PATIENT INSTRUCTIONS
"  Dear Doretha,     Based on what you have said, Christiano should be tested today because of the symptoms.  It would not be advised to test you today because a). We don't know that Christiano his COVID and b) testing is recommend 5-7 days AFTER exposure to a positive case.     Everyone requiring testing will need their own visit.  This can be through an e-visit such as this, or through [email or web address]      Below is general information on scheduling for the testing. Your testing should be 5-7 days from \"exposure\" to symptoms.    How to schedule:  Go to your [employer] home page and scroll down to the section that says  You have an appointment that needs to be scheduled  and click the large green button that says  Schedule Now  and follow the steps to find the next available opening.     If you are unable to complete these [employer] scheduling steps, please call [phone] to schedule your testing.     Return to work/school/ guidance:   For people with high risk exposures outside the home    Please let your workplace manager and staffing office know when your quarantine ends.     We can not give you an exact date as it depends on the information below. You can calculate this on your own or work with your manager/staffing office to calculate this. (For example if you were exposed on 10/4, you would have to quarantine for 14 full days. That would be through 10/18. You could return on 10/19.)    Quarantine Guidelines:  Patients (\"contacts\") who have been in close prolonged contact of an infected person(s) (within six feet for at least 15 minutes within a 24 hour period), and remain asymptomatic should enter quarantine based on the following options:    14-day quarantine period (this remains the CDC recommendation for the greatest protection against spread of COVID-19) OR    Minimum 7-day quarantine with negative RT-PCR test collected on day 5 or later OR    10-day quarantine with no test  Quarantine Guideline exceptions are as " follows:    People who have been fully vaccinated do not need to quarantine if the exposure was at least 2 weeks after the last vaccination.    Individuals who work in health care, congregate care, or congregate living should be off work for 14 days from their last date of exposure. Community activities for this group can be resumed based on options above. Vaccinated individuals in this group still need to quarantine from work after exposure, but they do not need to quarantine from non-work activities.    People whose high-risk exposure was a household member should always quarantine for 14 days from their last date of exposure.    Residents of congregate care and congregate living settings should always quarantine for 14 days from their last date of exposure.  Note: If you have ongoing exposure to the covid positive person, this quarantine period may be more than 14 days. (For example, if you are continued to be exposed to your child who tested positive and cannot isolate from them, then the quarantine of 7-14 days can't start until your child is no longer contagious. This is typically 10 days from onset of the child's symptoms. So the total duration may be 17-24 days in this case.)    You should continue symptom monitoring until day 14 post-exposure. If you develop signs or symptoms of COVID-19, isolate and get tested (even if you have been tested already).    How to quarantine:   Stay home and away from others. Don't go to school or anywhere else. Generally quarantine means staying home from work but there are some exceptions to this. Please contact your workplace.  No hugging, kissing or shaking hands.  Don't let anyone visit.  Cover your mouth and nose with a mask, tissue or washcloth to avoid spreading germs.  Wash your hands and face often. Use soap and water.    What are the symptoms of COVID-19?  The most common symptoms are cough, fever and trouble breathing. Less common symptoms include headache, body  aches, fatigue (feeling very tired), chills, sore throat, stuffy or runny nose, diarrhea (loose poop), loss of taste or smell, belly pain, and nausea or vomiting (feeling sick to your stomach or throwing up).  After 14 days, if you have still don't have symptoms, you likely don't have this virus.  If you develop symptoms, follow these guidelines.  If you're normally healthy: Please start another eVisit.  If you have a serious health problem (like cancer, heart failure, an organ transplant or kidney disease): Call your specialty clinic. Let them know that you might have COVID-19.    Where can I get more information?   [facility] [employer] - About COVID-19: [email or web address]  CDC - What to Do If You're Sick: www.cdc.gov/coronavirus/2019-ncov/about/steps-when-sick.html  CDC - Ending Home Isolation: www.cdc.gov/coronavirus/2019-ncov/hcp/disposition-in-home-patients.html  CDC - Caring for Someone: www.cdc.gov/coronavirus/2019-ncov/if-you-are-sick/care-for-someone.html  [facility] clinical trials (COVID-19 research studies): clinicalaffairs.[facility]-clinical-trials  Below are the COVID-19 hotlines at the [facility] ([facility]). Interpreters are available.  For health questions: Call 621-864-8380 or 1-967.870.9949 (7 a.m. to 7 p.m.)  For questions about schools and childcare: Call 472-235-5302 or 1-705.114.6961 (7 a.m. to 7 p.m.)

## 2021-03-17 NOTE — TELEPHONE ENCOUNTER
Her child was sent home from school today with emesis x3 at school and low grade temp 99.9. No emesis since she is sleeping now. Also c/o headache .[place] so [place] to get [place] covid test. Would you order this?

## 2021-03-19 ENCOUNTER — TELEPHONE (OUTPATIENT)
Dept: FAMILY MEDICINE | Facility: CLINIC | Age: 46
End: 2021-03-19

## 2021-03-19 ENCOUNTER — VIRTUAL VISIT (OUTPATIENT)
Dept: FAMILY MEDICINE | Facility: CLINIC | Age: 46
End: 2021-03-19
Payer: COMMERCIAL

## 2021-03-19 DIAGNOSIS — K51.919 ULCERATIVE COLITIS WITH COMPLICATION, UNSPECIFIED LOCATION (H): ICD-10-CM

## 2021-03-19 DIAGNOSIS — E13.9 DIABETES MELLITUS, IATROGENIC (H): Chronic | ICD-10-CM

## 2021-03-19 DIAGNOSIS — J02.9 SORE THROAT: ICD-10-CM

## 2021-03-19 DIAGNOSIS — Z20.818 STREP THROAT EXPOSURE: Primary | ICD-10-CM

## 2021-03-19 DIAGNOSIS — E66.01 MORBID OBESITY (H): Chronic | ICD-10-CM

## 2021-03-19 DIAGNOSIS — D84.9 IMMUNOSUPPRESSED STATUS (H): ICD-10-CM

## 2021-03-19 DIAGNOSIS — M06.9 RHEUMATOID ARTHRITIS, INVOLVING UNSPECIFIED SITE, UNSPECIFIED WHETHER RHEUMATOID FACTOR PRESENT (H): ICD-10-CM

## 2021-03-19 PROCEDURE — 99214 OFFICE O/P EST MOD 30 MIN: CPT | Mod: GT | Performed by: PHYSICIAN ASSISTANT

## 2021-03-19 RX ORDER — PENICILLIN V POTASSIUM 500 MG/1
500 TABLET, FILM COATED ORAL 2 TIMES DAILY
Qty: 20 TABLET | Refills: 0 | Status: SHIPPED | OUTPATIENT
Start: 2021-03-19 | End: 2021-04-05

## 2021-03-19 NOTE — PATIENT INSTRUCTIONS
Decided to treat for strep without test    If not improving, trouble swallowing or breathing, developing a new fever that is not resolving, or any concerns, be seen

## 2021-03-19 NOTE — PROGRESS NOTES
Doretha is a 46 year old who is being evaluated via a billable video visit.      How would you like to obtain your AVS? MyChart  If the video visit is dropped, the invitation should be resent by: Text to cell phone: [phone]  Will anyone else be joining your video visit? No    Video Start Time: 10:58 AM    Assessment & Plan   (Z20.818) Strep throat exposure  (primary encounter diagnosis)  (J02.9) Sore throat  Plan: penicillin V (VEETID) 500 MG tablet    (D84.9) Immunosuppressed status (H)  (K51.919) Ulcerative colitis with complication, unspecified location (H)  (M06.9) Rheumatoid arthritis, involving unspecified site, unspecified whether rheumatoid factor present (H)  Iatrogenic DM  Immunosuppression related to her UC and RA, chronic prednisone.  Increasing risks.    (E66.01) Morbid obesity (H)  Worsened, defer to primary  Patient Instructions   Decided to treat for strep without test    If not improving, trouble swallowing or breathing, developing a new fever that is not resolving, or any concerns, be seen      No follow-ups on file.    Carolina Pelaez PA-C  [facility]    Subjective   Doretha is a 46 year old who presents for the following health issues     HPI     Acute Illness  Acute illness concerns: Strep  Onset/Duration: Couple days  Symptoms:  Fever: YES- feels flush  Chills/Sweats: no  Headache (location?): YES - not uncommon for her  Sinus Pressure: no  Conjunctivitis:  no  Ear Pain: YES: bilateral  Rhinorrhea: no  Congestion: no  Sore Throat: YES- glands also swollen  Cough: no  Wheeze: no  Decreased Appetite: YES- stomach ache  Nausea: YES  Vomiting: no  Diarrhea: nom, but states she's getting there  Dysuria/Freq.: no  Dysuria or Hematuria: no  Fatigue/Achiness: YES - worse than her usual   Sick/Strep Exposure: YES- oldest daughter has strep  Therapies tried and outcome: tylenol    Stomach off for a few days, but today woke with sore throat.  Face flushed.  Unsure if  fever.  Glands swollen.  Feels like her previous experiences with strep.  Had neg covid test for work.  Multiple comorbidities including immunosuppression.  Objective         Vitals:  No vitals were obtained today due to virtual visit.    Physical Exam   GENERAL: Healthy, alert and no distress  EYES: Eyes grossly normal to inspection.  No discharge or erythema, or obvious scleral/conjunctival abnormalities.  RESP: No audible wheeze, cough, or visible cyanosis.  No visible retractions or increased work of breathing.    SKIN: Visible skin clear. No significant rash, abnormal pigmentation or lesions.  NEURO: Cranial nerves grossly intact.  Mentation and speech appropriate for age.  PSYCH: Mentation appears normal, affect normal/bright, judgement and insight intact, normal speech and appearance well-groomed.          Video-Visit Details    Type of service:  Video Visit    Video End Time:11:07 AM    Originating Location (pt. Location): Home    Distant Location (provider location):  [facility]     Platform used for Video Visit: [employer]

## 2021-03-19 NOTE — TELEPHONE ENCOUNTER
S-(situation): Received call from patient    B-(background): Patient has sore throat. Daughter in house positive for strep throat.    A-(assessment): No fever, no cough. Sore throat    R-(recommendations): Video appointment scheduled.    Melida CAMERON RN

## 2021-03-27 ENCOUNTER — AMBULATORY - HEALTHEAST (OUTPATIENT)
Dept: NURSING | Facility: CLINIC | Age: 46
End: 2021-03-27

## 2021-04-05 ENCOUNTER — HOSPITAL ENCOUNTER (OUTPATIENT)
Dept: ULTRASOUND IMAGING | Facility: CLINIC | Age: 46
Discharge: HOME | End: 2021-04-05
Attending: FAMILY MEDICINE | Admitting: FAMILY MEDICINE
Payer: COMMERCIAL

## 2021-04-05 ENCOUNTER — OFFICE VISIT (OUTPATIENT)
Dept: FAMILY MEDICINE | Facility: CLINIC | Age: 46
End: 2021-04-05
Payer: COMMERCIAL

## 2021-04-05 VITALS
WEIGHT: 255 LBS | BODY MASS INDEX: 45.18 KG/M2 | RESPIRATION RATE: 18 BRPM | SYSTOLIC BLOOD PRESSURE: 112 MMHG | TEMPERATURE: 97.8 F | OXYGEN SATURATION: 97 % | HEIGHT: 63 IN | DIASTOLIC BLOOD PRESSURE: 80 MMHG | HEART RATE: 93 BPM

## 2021-04-05 DIAGNOSIS — I89.0 LYMPHEDEMA OF LEFT ARM: ICD-10-CM

## 2021-04-05 DIAGNOSIS — K52.9 COLITIS: Primary | ICD-10-CM

## 2021-04-05 DIAGNOSIS — M79.89 LEFT ARM SWELLING: ICD-10-CM

## 2021-04-05 DIAGNOSIS — M79.89 LEFT ARM SWELLING: Primary | ICD-10-CM

## 2021-04-05 DIAGNOSIS — E13.9 DIABETES MELLITUS, IATROGENIC (H): Chronic | ICD-10-CM

## 2021-04-05 LAB
CHOLEST SERPL-MCNC: 207 MG/DL
HBA1C MFR BLD: 6.1 % (ref 0–5.6)
HDLC SERPL-MCNC: 40 MG/DL
LDLC SERPL CALC-MCNC: 118 MG/DL
NONHDLC SERPL-MCNC: 167 MG/DL
TRIGL SERPL-MCNC: 244 MG/DL

## 2021-04-05 PROCEDURE — 99213 OFFICE O/P EST LOW 20 MIN: CPT | Performed by: FAMILY MEDICINE

## 2021-04-05 PROCEDURE — 93971 EXTREMITY STUDY: CPT | Mod: 25,LT

## 2021-04-05 PROCEDURE — 36415 COLL VENOUS BLD VENIPUNCTURE: CPT | Performed by: FAMILY MEDICINE

## 2021-04-05 PROCEDURE — 80061 LIPID PANEL: CPT | Performed by: FAMILY MEDICINE

## 2021-04-05 PROCEDURE — 83036 HEMOGLOBIN GLYCOSYLATED A1C: CPT | Performed by: FAMILY MEDICINE

## 2021-04-05 RX ORDER — ROSUVASTATIN CALCIUM 5 MG/1
5 TABLET, COATED ORAL DAILY
Qty: 90 TABLET | Refills: 3 | Status: SHIPPED | OUTPATIENT
Start: 2021-04-05 | End: 2021-04-05 | Stop reason: DRUGHIGH

## 2021-04-05 RX ORDER — ROSUVASTATIN CALCIUM 10 MG/1
10 TABLET, COATED ORAL DAILY
Qty: 90 TABLET | Refills: 3 | Status: SHIPPED | OUTPATIENT
Start: 2021-04-05 | End: 2022-02-07

## 2021-04-05 ASSESSMENT — PAIN SCALES - GENERAL: PAINLEVEL: SEVERE PAIN (6)

## 2021-04-05 ASSESSMENT — MIFFLIN-ST. JEOR: SCORE: 1762.86

## 2021-04-05 NOTE — PROGRESS NOTES
"    Assessment & Plan     Diabetes mellitus, iatrogenic (H)   well controlled  - rosuvastatin (CRESTOR) 5 MG tablet; Take 1 tablet (5 mg) by mouth daily  - Hemoglobin A1c  - Lipid panel reflex to direct LDL Fasting    Left arm swelling   no DVT  - US Extremity Upper Venous  lt; Future    Lymphedema of left arm  Pain/swelling all due to lymphedema.  Recommend she restart PT for lymphedema therapy and new compression garments.    - US Extremity Upper Venous  lt; Future  - PHYSICAL THERAPY REFERRAL; Future             BMI:   Estimated body mass index is 45.9 kg/m  as calculated from the following:    Height as of this encounter: 1.588 m (5' 2.5\").    Weight as of this encounter: 115.7 kg (255 lb).           No follow-ups on file.    Anna Naidu MD  [facility]   Doretha is a 46 year old who presents for the following health issues  accompanied by her self:    HPI     Chief Complaint   Patient presents with     Lymphedema     Patient left arm is swollen and very painful. Patient will noticed by the end of the day her legs and feet will also become swollen. Patient rates pain currently in left hand and arm 6/10 and slept with her left arm elevated through out night.      Rectal Problem     Patient has spoke to [facility] and has labs to be completed due to blood in stool X 2 weeks.      Patient with h/o metastatic melanoma found in lymph node of the left axilla 10/2017.  She has had lymphedema in the past and has compression garments that she's not able to get on due to swelling.  She has a lymphedema machine- finds that when she's using it she gets more swelling in her upper chest and neck/face area.  She was told by a lymphedema specialist that she may not be able to really treat this until the prednisone is done completely.    There is more pain in the left arm with significant swelling of the hand and forearm.  She's been trying to elevate her arm as much as possible.    She does " "not feel the pain is increased in her joints at all.     Is awaiting lab orders from [place] for rectal bleeding.      Review of Systems   Constitutional, HEENT, cardiovascular, pulmonary, gi and gu systems are negative, except as otherwise noted.      Objective    /80   Pulse 93   Temp 97.8  F (36.6  C) (Tympanic)   Resp 18   Ht 1.588 m (5' 2.5\")   Wt 115.7 kg (255 lb)   SpO2 97%   Breastfeeding No   BMI 45.90 kg/m    Body mass index is 45.9 kg/m .  Physical Exam   GENERAL: healthy, alert and no distress  NECK: no adenopathy, no asymmetry, masses, or scars and thyroid normal to palpation  RESP: lungs clear to auscultation - no rales, rhonchi or wheezes  CV: regular rate and rhythm, normal S1 S2, no S3 or S4, no murmur, click or rub, no peripheral edema and peripheral pulses strong  ABDOMEN: soft, nontender, no hepatosplenomegaly, no masses and bowel sounds normal  MS: swelling of the left arm *upper arm, forearm and hand  No erythema or warmth      US negative for DVT            "

## 2021-04-05 NOTE — PATIENT INSTRUCTIONS
Our Clinic hours are:  Mondays    7:20 am - 7 pm  Tues -  Fri  7:20 am - 5 pm    Clinic Phone: [phone]    The clinic lab opens at 7:30 am Mon - Fri and appointments are required.    [facility]. [phone]  Monday  8 am - 7pm  Tues - Fri 8 am - 5:30 pm

## 2021-04-06 DIAGNOSIS — K52.9 COLITIS: Primary | ICD-10-CM

## 2021-04-06 LAB — MISCELLANEOUS TEST: NORMAL

## 2021-04-07 LAB
RESULT: NORMAL
SEND OUTS MISC TEST CODE: NORMAL
SEND OUTS MISC TEST SPECIMEN: NORMAL
TEST NAME: NORMAL

## 2021-04-10 ENCOUNTER — HEALTH MAINTENANCE LETTER (OUTPATIENT)
Age: 46
End: 2021-04-10

## 2021-04-12 ENCOUNTER — TELEPHONE (OUTPATIENT)
Dept: FAMILY MEDICINE | Facility: CLINIC | Age: 46
End: 2021-04-12

## 2021-04-12 NOTE — TELEPHONE ENCOUNTER
Reason for Call:  Other call back    Detailed comments: Pt is having a bad face rash and wanting to get in with Dr. Gipson before her appointment in [facility].4/15/21 Please call.    Phone Number Patient can be reached at: Cell number on file:    Telephone Information:   Mobile [phone]       Best Time: any time    Can we leave a detailed message on this number? YES    Call taken on 4/12/2021 at 4:10 PM by Mahnaz Lepe

## 2021-04-12 NOTE — TELEPHONE ENCOUNTER
Left message on answering machine to return call. Direct line provided. Patient has not seen Dr. Gipson before. Need to find out if patient is wanting to see her PCP Dr. Naidu.  Karen Anders RN

## 2021-04-13 ENCOUNTER — APPOINTMENT (OUTPATIENT)
Dept: GENERAL RADIOLOGY | Facility: CLINIC | Age: 46
End: 2021-04-13
Attending: FAMILY MEDICINE
Payer: COMMERCIAL

## 2021-04-13 ENCOUNTER — APPOINTMENT (OUTPATIENT)
Dept: CT IMAGING | Facility: CLINIC | Age: 46
End: 2021-04-13
Attending: FAMILY MEDICINE
Payer: COMMERCIAL

## 2021-04-13 ENCOUNTER — HOSPITAL ENCOUNTER (OUTPATIENT)
Facility: CLINIC | Age: 46
Setting detail: OBSERVATION
Discharge: HOME | End: 2021-04-15
Attending: FAMILY MEDICINE | Admitting: EMERGENCY MEDICINE
Payer: COMMERCIAL

## 2021-04-13 DIAGNOSIS — Z79.84 LONG TERM (CURRENT) USE OF ORAL HYPOGLYCEMIC DRUGS: ICD-10-CM

## 2021-04-13 DIAGNOSIS — F41.9 ANXIETY DISORDER, UNSPECIFIED TYPE: ICD-10-CM

## 2021-04-13 DIAGNOSIS — Z85.820 PERSONAL HISTORY OF MALIGNANT MELANOMA OF SKIN: ICD-10-CM

## 2021-04-13 DIAGNOSIS — R07.9 CHEST PAIN, UNSPECIFIED TYPE: ICD-10-CM

## 2021-04-13 DIAGNOSIS — F32.9 MAJOR DEPRESSIVE DISORDER WITH SINGLE EPISODE, REMISSION STATUS UNSPECIFIED: ICD-10-CM

## 2021-04-13 DIAGNOSIS — Z20.822 CONTACT WITH AND (SUSPECTED) EXPOSURE TO COVID-19: ICD-10-CM

## 2021-04-13 DIAGNOSIS — E86.0 DEHYDRATION: ICD-10-CM

## 2021-04-13 DIAGNOSIS — K92.1 HEMATOCHEZIA: ICD-10-CM

## 2021-04-13 DIAGNOSIS — A04.72 C. DIFFICILE COLITIS: ICD-10-CM

## 2021-04-13 DIAGNOSIS — E11.9 TYPE 2 DIABETES MELLITUS WITHOUT COMPLICATION, WITHOUT LONG-TERM CURRENT USE OF INSULIN (H): ICD-10-CM

## 2021-04-13 DIAGNOSIS — A09 DIARRHEA OF INFECTIOUS ORIGIN: ICD-10-CM

## 2021-04-13 LAB
ABO + RH BLD: NORMAL
ABO + RH BLD: NORMAL
ALBUMIN SERPL-MCNC: 3.8 G/DL (ref 3.4–5)
ALBUMIN UR-MCNC: NEGATIVE MG/DL
ALP SERPL-CCNC: 50 U/L (ref 40–150)
ALT SERPL W P-5'-P-CCNC: 42 U/L (ref 0–50)
ANION GAP SERPL CALCULATED.3IONS-SCNC: 6 MMOL/L (ref 3–14)
APPEARANCE UR: CLEAR
APTT PPP: 22 SEC (ref 22–37)
AST SERPL W P-5'-P-CCNC: 20 U/L (ref 0–45)
BACTERIA #/AREA URNS HPF: ABNORMAL /HPF
BASOPHILS # BLD AUTO: 0.1 10E9/L (ref 0–0.2)
BASOPHILS NFR BLD AUTO: 0.7 %
BILIRUB SERPL-MCNC: 0.1 MG/DL (ref 0.2–1.3)
BILIRUB UR QL STRIP: NEGATIVE
BLD GP AB SCN SERPL QL: NORMAL
BLOOD BANK CMNT PATIENT-IMP: NORMAL
BUN SERPL-MCNC: 15 MG/DL (ref 7–30)
CALCIUM SERPL-MCNC: 9.8 MG/DL (ref 8.5–10.1)
CHLORIDE SERPL-SCNC: 107 MMOL/L (ref 94–109)
CO2 SERPL-SCNC: 25 MMOL/L (ref 20–32)
COLOR UR AUTO: ABNORMAL
CREAT SERPL-MCNC: 0.71 MG/DL (ref 0.52–1.04)
CRP SERPL-MCNC: 3 MG/L (ref 0–8)
DIFFERENTIAL METHOD BLD: NORMAL
EOSINOPHIL # BLD AUTO: 0.1 10E9/L (ref 0–0.7)
EOSINOPHIL NFR BLD AUTO: 1 %
ERYTHROCYTE [DISTWIDTH] IN BLOOD BY AUTOMATED COUNT: 12.4 % (ref 10–15)
ERYTHROCYTE [SEDIMENTATION RATE] IN BLOOD BY WESTERGREN METHOD: 4 MM/H (ref 0–20)
FLUAV RNA RESP QL NAA+PROBE: NEGATIVE
FLUBV RNA RESP QL NAA+PROBE: NEGATIVE
GFR SERPL CREATININE-BSD FRML MDRD: >90 ML/MIN/{1.73_M2}
GLUCOSE SERPL-MCNC: 99 MG/DL (ref 70–99)
GLUCOSE UR STRIP-MCNC: NEGATIVE MG/DL
HCG UR QL: NEGATIVE
HCT VFR BLD AUTO: 40.4 % (ref 35–47)
HGB BLD-MCNC: 13.1 G/DL (ref 11.7–15.7)
HGB UR QL STRIP: NEGATIVE
IMM GRANULOCYTES # BLD: 0.2 10E9/L (ref 0–0.4)
IMM GRANULOCYTES NFR BLD: 1.5 %
INR PPP: 0.91 (ref 0.86–1.14)
INTERNAL QC OK POCT: YES
INTERPRETATION ECG - MUSE: NORMAL
KETONES UR STRIP-MCNC: NEGATIVE MG/DL
LABORATORY COMMENT REPORT: NORMAL
LACTATE BLD-SCNC: 2.1 MMOL/L (ref 0.7–2)
LACTATE BLD-SCNC: 2.7 MMOL/L (ref 0.7–2)
LEUKOCYTE ESTERASE UR QL STRIP: NEGATIVE
LIPASE SERPL-CCNC: 53 U/L (ref 73–393)
LYMPHOCYTES # BLD AUTO: 1.7 10E9/L (ref 0.8–5.3)
LYMPHOCYTES NFR BLD AUTO: 16.8 %
MAGNESIUM SERPL-MCNC: 2.1 MG/DL (ref 1.6–2.3)
MCH RBC QN AUTO: 31.4 PG (ref 26.5–33)
MCHC RBC AUTO-ENTMCNC: 32.4 G/DL (ref 31.5–36.5)
MCV RBC AUTO: 97 FL (ref 78–100)
MONOCYTES # BLD AUTO: 0.7 10E9/L (ref 0–1.3)
MONOCYTES NFR BLD AUTO: 6.5 %
NEUTROPHILS # BLD AUTO: 7.5 10E9/L (ref 1.6–8.3)
NEUTROPHILS NFR BLD AUTO: 73.5 %
NITRATE UR QL: NEGATIVE
NRBC # BLD AUTO: 0 10*3/UL
NRBC BLD AUTO-RTO: 0 /100
NT-PROBNP SERPL-MCNC: 13 PG/ML (ref 0–450)
PH UR STRIP: 7 PH (ref 5–7)
PLATELET # BLD AUTO: 281 10E9/L (ref 150–450)
POTASSIUM SERPL-SCNC: 4.3 MMOL/L (ref 3.4–5.3)
PROT SERPL-MCNC: 7 G/DL (ref 6.8–8.8)
RBC # BLD AUTO: 4.17 10E12/L (ref 3.8–5.2)
RBC #/AREA URNS AUTO: 0 /HPF (ref 0–2)
RSV RNA SPEC QL NAA+PROBE: NEGATIVE
SARS-COV-2 RNA RESP QL NAA+PROBE: NEGATIVE
SODIUM SERPL-SCNC: 138 MMOL/L (ref 133–144)
SOURCE: ABNORMAL
SP GR UR STRIP: 1.01 (ref 1–1.03)
SPECIMEN EXP DATE BLD: NORMAL
SPECIMEN SOURCE: NORMAL
SQUAMOUS #/AREA URNS AUTO: 1 /HPF (ref 0–1)
TROPONIN I SERPL-MCNC: <0.015 UG/L (ref 0–0.04)
UROBILINOGEN UR STRIP-MCNC: NORMAL MG/DL (ref 0–2)
WBC # BLD AUTO: 10.2 10E9/L (ref 4–11)
WBC #/AREA URNS AUTO: <1 /HPF (ref 0–5)

## 2021-04-13 PROCEDURE — 74177 CT ABD & PELVIS W/CONTRAST: CPT

## 2021-04-13 PROCEDURE — 83735 ASSAY OF MAGNESIUM: CPT | Performed by: FAMILY MEDICINE

## 2021-04-13 PROCEDURE — 87636 SARSCOV2 & INF A&B AMP PRB: CPT | Performed by: FAMILY MEDICINE

## 2021-04-13 PROCEDURE — 85610 PROTHROMBIN TIME: CPT | Performed by: FAMILY MEDICINE

## 2021-04-13 PROCEDURE — 81001 URINALYSIS AUTO W/SCOPE: CPT | Performed by: FAMILY MEDICINE

## 2021-04-13 PROCEDURE — 93005 ELECTROCARDIOGRAM TRACING: CPT | Performed by: FAMILY MEDICINE

## 2021-04-13 PROCEDURE — 96361 HYDRATE IV INFUSION ADD-ON: CPT | Performed by: FAMILY MEDICINE

## 2021-04-13 PROCEDURE — 85652 RBC SED RATE AUTOMATED: CPT | Performed by: FAMILY MEDICINE

## 2021-04-13 PROCEDURE — 250N000013 HC RX MED GY IP 250 OP 250 PS 637: Performed by: PHYSICIAN ASSISTANT

## 2021-04-13 PROCEDURE — 96360 HYDRATION IV INFUSION INIT: CPT | Mod: 59 | Performed by: FAMILY MEDICINE

## 2021-04-13 PROCEDURE — G0378 HOSPITAL OBSERVATION PER HR: HCPCS

## 2021-04-13 PROCEDURE — 250N000011 HC RX IP 250 OP 636: Performed by: FAMILY MEDICINE

## 2021-04-13 PROCEDURE — 258N000003 HC RX IP 258 OP 636: Performed by: FAMILY MEDICINE

## 2021-04-13 PROCEDURE — 71045 X-RAY EXAM CHEST 1 VIEW: CPT | Mod: 26 | Performed by: RADIOLOGY

## 2021-04-13 PROCEDURE — 83880 ASSAY OF NATRIURETIC PEPTIDE: CPT | Performed by: FAMILY MEDICINE

## 2021-04-13 PROCEDURE — 81025 URINE PREGNANCY TEST: CPT | Performed by: FAMILY MEDICINE

## 2021-04-13 PROCEDURE — 250N000013 HC RX MED GY IP 250 OP 250 PS 637: Performed by: FAMILY MEDICINE

## 2021-04-13 PROCEDURE — 83605 ASSAY OF LACTIC ACID: CPT | Performed by: FAMILY MEDICINE

## 2021-04-13 PROCEDURE — C9803 HOPD COVID-19 SPEC COLLECT: HCPCS | Performed by: FAMILY MEDICINE

## 2021-04-13 PROCEDURE — 74177 CT ABD & PELVIS W/CONTRAST: CPT | Mod: 26 | Performed by: RADIOLOGY

## 2021-04-13 PROCEDURE — 87040 BLOOD CULTURE FOR BACTERIA: CPT | Performed by: FAMILY MEDICINE

## 2021-04-13 PROCEDURE — 86140 C-REACTIVE PROTEIN: CPT | Performed by: FAMILY MEDICINE

## 2021-04-13 PROCEDURE — 86901 BLOOD TYPING SEROLOGIC RH(D): CPT | Performed by: FAMILY MEDICINE

## 2021-04-13 PROCEDURE — 85730 THROMBOPLASTIN TIME PARTIAL: CPT | Performed by: FAMILY MEDICINE

## 2021-04-13 PROCEDURE — 71045 X-RAY EXAM CHEST 1 VIEW: CPT

## 2021-04-13 PROCEDURE — 84484 ASSAY OF TROPONIN QUANT: CPT | Performed by: FAMILY MEDICINE

## 2021-04-13 PROCEDURE — 85025 COMPLETE CBC W/AUTO DIFF WBC: CPT | Performed by: FAMILY MEDICINE

## 2021-04-13 PROCEDURE — 80053 COMPREHEN METABOLIC PANEL: CPT | Performed by: FAMILY MEDICINE

## 2021-04-13 PROCEDURE — 258N000003 HC RX IP 258 OP 636: Performed by: PHYSICIAN ASSISTANT

## 2021-04-13 PROCEDURE — 86900 BLOOD TYPING SEROLOGIC ABO: CPT | Performed by: FAMILY MEDICINE

## 2021-04-13 PROCEDURE — 86850 RBC ANTIBODY SCREEN: CPT | Performed by: FAMILY MEDICINE

## 2021-04-13 PROCEDURE — 93010 ELECTROCARDIOGRAM REPORT: CPT | Performed by: FAMILY MEDICINE

## 2021-04-13 PROCEDURE — 99285 EMERGENCY DEPT VISIT HI MDM: CPT | Mod: 25 | Performed by: FAMILY MEDICINE

## 2021-04-13 PROCEDURE — 83690 ASSAY OF LIPASE: CPT | Performed by: FAMILY MEDICINE

## 2021-04-13 RX ORDER — GABAPENTIN 300 MG/1
900 CAPSULE ORAL ONCE
Status: COMPLETED | OUTPATIENT
Start: 2021-04-13 | End: 2021-04-13

## 2021-04-13 RX ORDER — PREDNISONE 5 MG/1
5 TABLET ORAL DAILY
Status: ON HOLD | COMMUNITY
End: 2021-05-03

## 2021-04-13 RX ORDER — VENLAFAXINE HYDROCHLORIDE 225 MG/1
225 TABLET, EXTENDED RELEASE ORAL DAILY
Status: DISCONTINUED | OUTPATIENT
Start: 2021-04-14 | End: 2021-04-13

## 2021-04-13 RX ORDER — ONDANSETRON 2 MG/ML
4 INJECTION INTRAMUSCULAR; INTRAVENOUS EVERY 6 HOURS PRN
Status: DISCONTINUED | OUTPATIENT
Start: 2021-04-13 | End: 2021-04-15 | Stop reason: HOSPADM

## 2021-04-13 RX ORDER — METFORMIN HCL 500 MG
1000 TABLET, EXTENDED RELEASE 24 HR ORAL
COMMUNITY
End: 2021-07-15

## 2021-04-13 RX ORDER — LIDOCAINE 40 MG/G
CREAM TOPICAL
Status: DISCONTINUED | OUTPATIENT
Start: 2021-04-13 | End: 2021-04-15 | Stop reason: HOSPADM

## 2021-04-13 RX ORDER — SODIUM CHLORIDE 9 MG/ML
INJECTION, SOLUTION INTRAVENOUS CONTINUOUS
Status: DISCONTINUED | OUTPATIENT
Start: 2021-04-13 | End: 2021-04-15 | Stop reason: HOSPADM

## 2021-04-13 RX ORDER — ONDANSETRON 4 MG/1
4 TABLET, ORALLY DISINTEGRATING ORAL EVERY 6 HOURS PRN
Status: DISCONTINUED | OUTPATIENT
Start: 2021-04-13 | End: 2021-04-15 | Stop reason: HOSPADM

## 2021-04-13 RX ORDER — ACETAMINOPHEN 650 MG/1
650 SUPPOSITORY RECTAL EVERY 4 HOURS PRN
Status: DISCONTINUED | OUTPATIENT
Start: 2021-04-13 | End: 2021-04-14

## 2021-04-13 RX ORDER — GABAPENTIN 300 MG/1
900 CAPSULE ORAL 4 TIMES DAILY
Status: DISCONTINUED | OUTPATIENT
Start: 2021-04-13 | End: 2021-04-15 | Stop reason: HOSPADM

## 2021-04-13 RX ORDER — IOPAMIDOL 755 MG/ML
135 INJECTION, SOLUTION INTRAVASCULAR ONCE
Status: COMPLETED | OUTPATIENT
Start: 2021-04-13 | End: 2021-04-13

## 2021-04-13 RX ORDER — VENLAFAXINE HYDROCHLORIDE 75 MG/1
225 CAPSULE, EXTENDED RELEASE ORAL DAILY
Status: DISCONTINUED | OUTPATIENT
Start: 2021-04-14 | End: 2021-04-15 | Stop reason: HOSPADM

## 2021-04-13 RX ORDER — ACETAMINOPHEN 500 MG
1000 TABLET ORAL EVERY 6 HOURS PRN
Status: DISCONTINUED | OUTPATIENT
Start: 2021-04-13 | End: 2021-04-15 | Stop reason: HOSPADM

## 2021-04-13 RX ORDER — HYDROXYZINE HYDROCHLORIDE 25 MG/1
25 TABLET, FILM COATED ORAL EVERY 6 HOURS PRN
Status: DISCONTINUED | OUTPATIENT
Start: 2021-04-13 | End: 2021-04-15 | Stop reason: HOSPADM

## 2021-04-13 RX ORDER — ACETAMINOPHEN 500 MG
1000 TABLET ORAL EVERY 8 HOURS PRN
Status: ON HOLD | COMMUNITY
End: 2021-06-18

## 2021-04-13 RX ORDER — ROSUVASTATIN CALCIUM 10 MG/1
10 TABLET, COATED ORAL DAILY
Status: DISCONTINUED | OUTPATIENT
Start: 2021-04-14 | End: 2021-04-15 | Stop reason: HOSPADM

## 2021-04-13 RX ORDER — METFORMIN HCL 500 MG
1000 TABLET, EXTENDED RELEASE 24 HR ORAL
Status: DISCONTINUED | OUTPATIENT
Start: 2021-04-14 | End: 2021-04-15 | Stop reason: HOSPADM

## 2021-04-13 RX ORDER — SODIUM CHLORIDE 9 MG/ML
INJECTION, SOLUTION INTRAVENOUS CONTINUOUS
Status: DISCONTINUED | OUTPATIENT
Start: 2021-04-13 | End: 2021-04-13

## 2021-04-13 RX ORDER — PREDNISONE 5 MG/1
5 TABLET ORAL DAILY
Status: DISCONTINUED | OUTPATIENT
Start: 2021-04-14 | End: 2021-04-15 | Stop reason: HOSPADM

## 2021-04-13 RX ORDER — VANCOMYCIN HYDROCHLORIDE 125 MG/1
125 CAPSULE ORAL 4 TIMES DAILY
Status: DISCONTINUED | OUTPATIENT
Start: 2021-04-13 | End: 2021-04-15 | Stop reason: HOSPADM

## 2021-04-13 RX ORDER — VANCOMYCIN HYDROCHLORIDE 125 MG/1
125 CAPSULE ORAL 4 TIMES DAILY
Status: ON HOLD | COMMUNITY
End: 2021-04-15

## 2021-04-13 RX ORDER — VANCOMYCIN HYDROCHLORIDE 125 MG/1
125 CAPSULE ORAL ONCE
Status: COMPLETED | OUTPATIENT
Start: 2021-04-13 | End: 2021-04-13

## 2021-04-13 RX ADMIN — GABAPENTIN 900 MG: 300 CAPSULE ORAL at 18:28

## 2021-04-13 RX ADMIN — VANCOMYCIN HYDROCHLORIDE 125 MG: 125 CAPSULE ORAL at 23:04

## 2021-04-13 RX ADMIN — VANCOMYCIN HYDROCHLORIDE 125 MG: 125 CAPSULE ORAL at 18:55

## 2021-04-13 RX ADMIN — IOPAMIDOL 135 ML: 755 INJECTION, SOLUTION INTRAVENOUS at 16:50

## 2021-04-13 RX ADMIN — SODIUM CHLORIDE 1000 ML: 9 INJECTION, SOLUTION INTRAVENOUS at 15:57

## 2021-04-13 RX ADMIN — GABAPENTIN 900 MG: 300 CAPSULE ORAL at 23:04

## 2021-04-13 RX ADMIN — SODIUM CHLORIDE: 9 INJECTION, SOLUTION INTRAVENOUS at 23:05

## 2021-04-13 RX ADMIN — HYDROXYZINE HYDROCHLORIDE 25 MG: 25 TABLET, FILM COATED ORAL at 23:04

## 2021-04-13 RX ADMIN — SODIUM CHLORIDE 1000 ML: 9 INJECTION, SOLUTION INTRAVENOUS at 14:19

## 2021-04-13 ASSESSMENT — ENCOUNTER SYMPTOMS
COLOR CHANGE: 0
ARTHRALGIAS: 0
CONSTIPATION: 1
ABDOMINAL PAIN: 1
SHORTNESS OF BREATH: 1
COUGH: 0
DIFFICULTY URINATING: 0
ANAL BLEEDING: 1
VOICE CHANGE: 0
FEVER: 0
CONFUSION: 0
EYE REDNESS: 0
DECREASED CONCENTRATION: 0
BRUISES/BLEEDS EASILY: 0
WEAKNESS: 1
APPETITE CHANGE: 0
BLOOD IN STOOL: 1
ACTIVITY CHANGE: 1
LIGHT-HEADEDNESS: 1
DYSPHORIC MOOD: 0
FATIGUE: 1
VOMITING: 0
NAUSEA: 0
TROUBLE SWALLOWING: 0
NECK STIFFNESS: 0
HEADACHES: 0

## 2021-04-13 ASSESSMENT — MIFFLIN-ST. JEOR: SCORE: 1762.86

## 2021-04-13 NOTE — ED PROVIDER NOTES
"    [place] EMERGENCY DEPARTMENT ([facility])  4/13/21  History     Chief Complaint   Patient presents with     Rectal Bleeding     Diarrhea     has CDIFF     The history is provided by the patient and medical records.     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 difficile colitis who presents to the Emergency Department for evaluation of ongoing rectal bleeding, and diarrhea.  Patient reports that she has had a fairly longstanding history with immunosuppressive induced ulcerative colitis, which has been treated with a variety of medications.  This treatment has been successful and her ulcerative colitis has been under control for quite a while.  She reports that she has been under \"a lot of stress\" over the last couple of weeks.  Patient subsequently began to develop constipation, and notable blood clots in her stool without the presence of abdominal pain.  This was noted to have began after she started the antibiotics for her strep throat.  Patient then had a stool culture done on 4/05/2021 which was positive for C. difficile colitis.  Since this diagnosis, she has been continually passing notable clots, bloody stools, and stool incontinence.  She reports that she had approximately 7 episodes of diarrhea prior to her ED evaluation.  Patient does endorse associated lightheadedness and shortness of breath when she is up moving around, but denies fevers.  Patient is reportedly due for another flexible sigmoidoscopy on Friday (4/16/2021).  Patient does endorse slight abdominal pain, but states that it is not " significant.  She denies any episodes of syncope.  No chest pain, cough, or other URI symptoms.    I have reviewed the Medications, Allergies, Past Medical and Surgical History, and Social History in the [employer] system.  PAST MEDICAL HISTORY:   Past Medical History:   Diagnosis Date     Abnormal MRI     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      Anxiety      Basal cell carcinoma      Cervical high risk HPV (human papillomavirus) test positive 2019    See problem list     Colitis      Depression      Diabetes mellitus, iatrogenic (H) 2020     Inflammatory arthritis      Insomnia      Intestinal giardiasis 3/5/2018     Lumbago     left lower back pain     Lymphedema      Malignant melanoma (H)      Melanoma (H) 10/23/2017     Mild persistent asthma      Obesity      STORMY (obstructive sleep apnea)      Prothrombin deficiency (H)     takes 81mg asa daily     Stroke (cerebrum) (H)     During      TIA (transient ischemic attack)      Type 2 diabetes mellitus (H)        PAST SURGICAL HISTORY:   Past Surgical History:   Procedure Laterality Date     APPENDECTOMY       COLONOSCOPY N/A 10/18/2017    Procedure: COLONOSCOPY;  Colon;  Surgeon: Debbie Stephens MD;  Location: [facility] OR     COLONOSCOPY N/A 3/9/2018    Procedure: COMBINED COLONOSCOPY, SINGLE OR MULTIPLE BIOPSY/POLYPECTOMY BY BIOPSY;  colon;  Surgeon: Benita Schumacher MD;  Location: [facility] GI     COLONOSCOPY      multiple since  to present - about 6 total     DISSECT LYMPH NODE AXILLA Left 10/23/2017    Procedure: DISSECT LYMPH NODE AXILLA;  Left Axillary Lymph Node Dissection ;  Surgeon: Laurent Cool MD;  Location: [facility] OR     EXAM UNDER ANESTHESIA PELVIC N/A 2020    Procedure: EXAM UNDER ANESTHESIA, PELVIS; with Cervical Biopsies, Vaginal Biopsy and Endocervical Curettings;  Surgeon: Melina Jung MD;  Location: [facility] OR     GYN SURGERY  ,          REPAIR MOHS Left 2017    Procedure: REPAIR MOHS;   Left Upper Lid Moh's Reconstruction;  Surgeon: Kisha Bosch MD;  Location: [facility] OR       Past medical history, past surgical history, medications, and allergies were reviewed with the patient. Additional pertinent items: None    FAMILY HISTORY:   Family History   Problem Relation Age of Onset     Cancer Mother 45        lung     Neurologic Disorder Mother         epilepsy     Lipids Father      Gastrointestinal Disease Father         diverticulitis      Depression Father      Cancer Maternal Grandmother      Blood Disease Maternal Grandmother         lymphoma      Arthritis Maternal Grandmother      Diabetes Maternal Grandmother      Depression Maternal Grandmother      Macular Degeneration Maternal Grandmother      Glaucoma Maternal Grandmother      Diabetes Maternal Grandfather      Cerebrovascular Disease Maternal Grandfather      Blood Disease Maternal Grandfather      Heart Disease Maternal Grandfather      Glaucoma Maternal Grandfather      Cancer Paternal Grandmother      Cancer - colorectal Paternal Grandmother      Respiratory Paternal Grandfather         emphysema      Heart Disease Daughter      Asthma Daughter      Depression Sister      Melanoma No family hx of        SOCIAL HISTORY:   Social History     Tobacco Use     Smoking status: Former Smoker     Packs/day: 1.00     Years: 5.00     Pack years: 5.00     Quit date: 3/20/1998     Years since quittin.0     Smokeless tobacco: Never Used   Substance Use Topics     Alcohol use: Yes     Comment: occ     Social history was reviewed with the patient. Additional pertinent items: None        Current Discharge Medication List      CONTINUE these medications which have NOT CHANGED    Details   acetaminophen (TYLENOL) 500 MG tablet Take 1,000 mg by mouth every 8 hours as needed for mild pain      Calcium Carb-Cholecalciferol 600-800 MG-UNIT TABS Take 800 mg by mouth daily before breakfast      ferrous fumarate 65 mg, Unga. FE,-Vitamin C 125 mg  (VITRON-C)  MG TABS tablet Take 1 tablet by mouth daily  Qty: 30 tablet, Refills: 3    Associated Diagnoses: Persistent insomnia      gabapentin (NEURONTIN) 300 MG capsule TAKE THREE CAPSULES BY MOUTH FOUR TIMES A DAY  Qty: 360 capsule, Refills: 0    Associated Diagnoses: Other chronic pain      hydrOXYzine (ATARAX) 25 MG tablet TAKE ONE TABLET BY MOUTH AT BEDTIME  Qty: 90 tablet, Refills: 1    Associated Diagnoses: Persistent insomnia      metFORMIN (GLUCOPHAGE-XR) 500 MG 24 hr tablet Take 1,000 mg by mouth Daily with breakfast.      predniSONE (DELTASONE) 5 MG tablet Take 5 mg by mouth daily      rosuvastatin (CRESTOR) 10 MG tablet Take 1 tablet (10 mg) by mouth daily  Qty: 90 tablet, Refills: 3    Associated Diagnoses: Diabetes mellitus, iatrogenic (H)      tocilizumab (ACTEMRA) 162 MG/0.9ML subcutaneous injection Inject 162 mg Subcutaneous every 14 days      vancomycin (VANCOCIN) 125 MG capsule Take 125 mg by mouth 4 times daily      venlafaxine (EFFEXOR-ER) 225 MG 24 hr tablet TAKE ONE TABLET BY MOUTH ONCE DAILY  Qty: 90 tablet, Refills: 1    Associated Diagnoses: Mild major depression (H); Anxiety state      VITAMIN D3 25 MCG (1000 UT) tablet TAKE THREE TABLETS BY MOUTH ONCE DAILY  Qty: 300 tablet, Refills: 1    Associated Diagnoses: Vitamin D deficiency      ACCU-CHEK GUIDE test strip USE TO TEST BLOOD SUGAR TWO TIMES A DAY OR AS DIRECTED  Qty: 200 strip, Refills: 0    Associated Diagnoses: Diabetes mellitus, iatrogenic (H)      blood glucose monitoring (ACCU-CHEK FASTCLIX) lancets USE TWO TIMES A DAY OR AS DIRECTED  Qty: 102 each, Refills: 9    Associated Diagnoses: Diabetes mellitus, iatrogenic (H)      ondansetron (ZOFRAN) 8 MG tablet Take 8 mg by mouth every 8 hours as needed for nausea (Pt has not taken in past year 2/10/20)                 Allergies   Allergen Reactions     Bee Venom Swelling     Azithromycin Diarrhea     Erythromycin      Other reaction(s): GI intolerance, Vomiting     Fentanyl  "Other (See Comments)     sweating  sweating     Prochlorperazine Fatigue     Other reaction(s): Other (see comments)  Fatigue     Buspirone      Other reaction(s): GI intolerance  vomiting     Erythrocin Nausea and Vomiting     Zithromax [Azithromycin Dihydrate] Diarrhea     Enbrel [Etanercept] Hives and Rash        Review of Systems   Constitutional: Positive for activity change and fatigue. Negative for appetite change and fever.   HENT: Negative for congestion, trouble swallowing and voice change.    Eyes: Negative for redness.   Respiratory: Positive for shortness of breath. Negative for cough.    Cardiovascular: Negative for chest pain.   Gastrointestinal: Positive for abdominal pain, anal bleeding, blood in stool and constipation. Negative for nausea and vomiting.   Genitourinary: Negative for difficulty urinating.   Musculoskeletal: Negative for arthralgias and neck stiffness.   Skin: Negative for color change and rash.   Allergic/Immunologic: Negative for immunocompromised state.   Neurological: Positive for weakness and light-headedness. Negative for syncope and headaches.   Hematological: Does not bruise/bleed easily.   Psychiatric/Behavioral: Negative for confusion, decreased concentration and dysphoric mood.   All other systems reviewed and are negative.      Physical Exam   BP: 137/71  Pulse: 87  Temp: 97.9  F (36.6  C)  Resp: 16  Height: 158.8 cm (5' 2.5\")  Weight: 115.7 kg (255 lb)  SpO2: 98 %      Physical Exam  Vitals signs and nursing note reviewed.   Constitutional:       General: She is in acute distress.      Appearance: She is well-developed. She is not toxic-appearing or diaphoretic.      Comments: Patient uncomfortable appearing  nontoxic   HENT:      Head: Normocephalic and atraumatic.      Nose: No congestion.      Mouth/Throat:      Mouth: Mucous membranes are dry.   Eyes:      General: No scleral icterus.     Extraocular Movements: Extraocular movements intact.      Conjunctiva/sclera: " Conjunctivae normal.      Pupils: Pupils are equal, round, and reactive to light.   Neck:      Musculoskeletal: Normal range of motion and neck supple. No neck rigidity.   Cardiovascular:      Rate and Rhythm: Normal rate.   Pulmonary:      Effort: Pulmonary effort is normal. No respiratory distress.      Breath sounds: No stridor.   Abdominal:      General: There is distension.      Palpations: Abdomen is soft.   Musculoskeletal:         General: No tenderness.      Right lower leg: Edema present.      Left lower leg: Edema present.   Skin:     General: Skin is warm and dry.      Capillary Refill: Capillary refill takes less than 2 seconds.      Coloration: Skin is not jaundiced or pale.      Findings: No erythema or rash.   Neurological:      General: No focal deficit present.      Mental Status: She is alert and oriented to person, place, and time. Mental status is at baseline.   Psychiatric:      Comments: Flat affect noted.  Otherwise appropriate         ED Course   1:53 PM  The patient was seen and examined by Ismael Spangler MD in Room ED05.   Patient IV established received 2 L normal saline bolus in the ER.  Labs drawn reviewed white count is 10.2 hemoglobin is 13.1 platelets are 281.  Liver function test within normal limits.  Calcium 9.8 sodium 138 potassium 4.3.  Glucose is 99 creatinine is 0.71.  CRP is 3 INR 0.91 type and screen drawn.  Sed rate is 4 BNP is 13 troponin negative.  Urinalysis without signs of infection.  Lipase 53.    Discussed with patient CT scan of the abdomen was done with contrast findings revealed no acute complications of colitis etc.  Stool noted.    Patient evaluated here in the ER.  He still had diarrhea etc. feeling weak and comfortable going home discussed with GI they will see the patient in the morning will plan admit to ED observation for ongoing hydration monitoring symptoms and GI consultation.  Patient does agree at this point.  Stable.     Procedures             EKG  Interpretation:      Interpreted by Ismael Spangler MD  Time reviewed: 1335  Symptoms at time of EKG: light headedness   Rhythm: normal sinus   Rate: normal  Axis: normal  Ectopy: none  Conduction: normal  ST Segments/ T Waves: No hyperacute ST-T wave changes  Q Waves: none  Comparison to prior: No old EKG available    Clinical Impression: normal EKG                The Lactic acid level is elevated due to dehydration, at this time there is no sign of severe sepsis or septic shock.       Results for orders placed or performed during the hospital encounter of 04/13/21 (from the past 24 hour(s))   EKG 12-lead, tracing only   Result Value Ref Range    Interpretation ECG Click View Image link to view waveform and result    XR Chest Port 1 View    Narrative    Exam: XR CHEST PORT 1 VW, 4/13/2021 1:45 PM    Indication: sob and light headedness with rectal bleeding    Comparison: 11/15/2019    Findings:   Heart and pulmonary vasculature within normal limits. Pleural spaces  are clear. Lungs are clear.      Impression    Impression: No cardiopulmonary disease identified.    ASIA ARAGON MD   CBC with platelets differential   Result Value Ref Range    WBC 10.2 4.0 - 11.0 10e9/L    RBC Count 4.17 3.8 - 5.2 10e12/L    Hemoglobin 13.1 11.7 - 15.7 g/dL    Hematocrit 40.4 35.0 - 47.0 %    MCV 97 78 - 100 fl    MCH 31.4 26.5 - 33.0 pg    MCHC 32.4 31.5 - 36.5 g/dL    RDW 12.4 10.0 - 15.0 %    Platelet Count 281 150 - 450 10e9/L    Diff Method Automated Method     % Neutrophils 73.5 %    % Lymphocytes 16.8 %    % Monocytes 6.5 %    % Eosinophils 1.0 %    % Basophils 0.7 %    % Immature Granulocytes 1.5 %    Nucleated RBCs 0 0 /100    Absolute Neutrophil 7.5 1.6 - 8.3 10e9/L    Absolute Lymphocytes 1.7 0.8 - 5.3 10e9/L    Absolute Monocytes 0.7 0.0 - 1.3 10e9/L    Absolute Eosinophils 0.1 0.0 - 0.7 10e9/L    Absolute Basophils 0.1 0.0 - 0.2 10e9/L    Abs Immature Granulocytes 0.2 0 - 0.4 10e9/L    Absolute Nucleated RBC 0.0     INR   Result Value Ref Range    INR 0.91 0.86 - 1.14   Partial thromboplastin time   Result Value Ref Range    PTT 22 22 - 37 sec   CRP inflammation   Result Value Ref Range    CRP Inflammation 3.0 0.0 - 8.0 mg/L   Erythrocyte sedimentation rate auto   Result Value Ref Range    Sed Rate 4 0 - 20 mm/h   Comprehensive metabolic panel   Result Value Ref Range    Sodium 138 133 - 144 mmol/L    Potassium 4.3 3.4 - 5.3 mmol/L    Chloride 107 94 - 109 mmol/L    Carbon Dioxide 25 20 - 32 mmol/L    Anion Gap 6 3 - 14 mmol/L    Glucose 99 70 - 99 mg/dL    Urea Nitrogen 15 7 - 30 mg/dL    Creatinine 0.71 0.52 - 1.04 mg/dL    GFR Estimate >90 >60 mL/min/[1.73_m2]    GFR Estimate If Black >90 >60 mL/min/[1.73_m2]    Calcium 9.8 8.5 - 10.1 mg/dL    Bilirubin Total 0.1 (L) 0.2 - 1.3 mg/dL    Albumin 3.8 3.4 - 5.0 g/dL    Protein Total 7.0 6.8 - 8.8 g/dL    Alkaline Phosphatase 50 40 - 150 U/L    ALT 42 0 - 50 U/L    AST 20 0 - 45 U/L   Magnesium   Result Value Ref Range    Magnesium 2.1 1.6 - 2.3 mg/dL   Lipase   Result Value Ref Range    Lipase 53 (L) 73 - 393 U/L   Blood culture    Specimen: Arm, Left; Blood    Left Arm   Result Value Ref Range    Specimen Description Blood Left Arm     Culture Micro No growth after 2 hours    Troponin I   Result Value Ref Range    Troponin I ES <0.015 0.000 - 0.045 ug/L   Nt probnp inpatient (BNP)   Result Value Ref Range    N-Terminal Pro BNP Inpatient 13 0 - 450 pg/mL   ABO/Rh type and screen   Result Value Ref Range    ABO A     RH(D) Pos     Antibody Screen Neg     Test Valid Only At          [facility]    Specimen Expires 04/16/2021    Lactic acid whole blood   Result Value Ref Range    Lactic Acid 2.7 (H) 0.7 - 2.0 mmol/L   UA with Microscopic reflex to Culture    Specimen: Urine Midstream; Midstream Urine   Result Value Ref Range    Color Urine Straw     Appearance Urine Clear     Glucose Urine Negative NEG^Negative mg/dL    Bilirubin Urine  Negative NEG^Negative    Ketones Urine Negative NEG^Negative mg/dL    Specific Gravity Urine 1.007 1.003 - 1.035    Blood Urine Negative NEG^Negative    pH Urine 7.0 5.0 - 7.0 pH    Protein Albumin Urine Negative NEG^Negative mg/dL    Urobilinogen mg/dL Normal 0.0 - 2.0 mg/dL    Nitrite Urine Negative NEG^Negative    Leukocyte Esterase Urine Negative NEG^Negative    Source Midstream Urine     WBC Urine <1 0 - 5 /HPF    RBC Urine 0 0 - 2 /HPF    Bacteria Urine Few (A) NEG^Negative /HPF    Squamous Epithelial /HPF Urine 1 0 - 1 /HPF   hCG qual urine POCT   Result Value Ref Range    HCG Qual Urine Negative neg    Internal QC OK Yes    CT Abdomen Pelvis w Contrast    Narrative    EXAMINATION: CT ABDOMEN PELVIS W CONTRAST, 4/13/2021 4:59 PM    TECHNIQUE:  Helical CT images from the lung bases through the  symphysis pubis were obtained with IV contrast. Contrast dose:  iopamidol (ISOVUE-370) solution 135 mL     COMPARISON: c diff with ongoing rectal bleeding and diarrhea with  elevated lactic acid    HISTORY: c diff with ongoing rectal bleeding and diarrhea with  elevated lactic acid.    FINDINGS::     Redundant sigmoid colon. A few scattered colonic diverticuli. No  adjacent inflammatory change. Moderate amount of semisolid stool  throughout the colon.    Liver: Normal parenchymal attenuation without focal mass.  Biliary system: Gallbladder is within normal limits. No intrahepatic  or extrahepatic biliary ductal dilatation.  Pancreas: Significantly atrophic and fatty replaced. No ductal  dilation or mass.  Stomach: Within normal limits.  Spleen: Within normal limits.  Adrenal glands: Within normal limits.  Kidneys: No focal mass, hydronephrosis, or stone.  Bladder: Within normal limits.  Reproductive organs: Innumerable tiny follicles on the ovaries. Uterus  is normal.  Appendix: Appendectomy stump appears normal.  Small Bowel: Small air-filled duodenal diverticulum extending off the  superior aspect of the third portion  the duodenum.  Lymph nodes: No intra-abdominal or pelvic lymphadenopathy.  Vasculature: Within normal limits.    Lung bases: Mild atelectatic changes in the lungs. Cardiac size is  normal without pericardial effusion..    Bones and soft tissues: No suspicious soft tissue mass or fluid  collection. No suspicious osseous lesion. Small fat filled umbilical  hernia.      Impression    IMPRESSION:   1. No evidence for active colitis or complications of C. difficile  colitis. Moderate amount of semisolid stool throughout the colon.   2. No other acute findings in abdomen or pelvis.  3. Incidental findings as above.    I have personally reviewed the examination and initial interpretation  and I agree with the findings.    JOVI VEGA MD   Lactic acid   Result Value Ref Range    Lactic Acid 2.1 (H) 0.7 - 2.0 mmol/L   Asymptomatic Influenza A/B & SARS-CoV2 (COVID-19) Virus PCR Multiplex    Specimen: Nasopharyngeal   Result Value Ref Range    Flu A/B & SARS-COV-2 PCR Source Nasopharyngeal     SARS-CoV-2 PCR Result NEGATIVE     Influenza A PCR Negative NEG^Negative    Influenza B PCR Negative NEG^Negative    Respiratory Syncytial Virus PCR Negative NEG^Negative    Flu A/B & SARS-CoV-2 PCR Comment (Note)      Medications   lidocaine 1 % 0.1-1 mL (has no administration in time range)   lidocaine (LMX4) cream (has no administration in time range)   sodium chloride (PF) 0.9% PF flush 3 mL (3 mLs Intracatheter Given 4/13/21 2305)   sodium chloride (PF) 0.9% PF flush 3 mL (has no administration in time range)   sodium chloride (PF) 0.9% PF flush 3 mL (has no administration in time range)   sodium chloride 0.9% infusion ( Intravenous New Bag 4/13/21 2305)   gabapentin (NEURONTIN) capsule 900 mg (900 mg Oral Given 4/13/21 2304)   hydrOXYzine (ATARAX) tablet 25 mg (25 mg Oral Given 4/13/21 2304)   metFORMIN (GLUCOPHAGE-XR) 24 hr tablet 1,000 mg (has no administration in time range)   predniSONE (DELTASONE) tablet 5 mg (has no  administration in time range)   rosuvastatin (CRESTOR) tablet 10 mg (has no administration in time range)   vancomycin (VANCOCIN) capsule 125 mg (125 mg Oral Given 4/13/21 2304)   acetaminophen (TYLENOL) tablet 1,000 mg (has no administration in time range)   acetaminophen (TYLENOL) Suppository 650 mg (has no administration in time range)   melatonin tablet 1 mg (has no administration in time range)   ondansetron (ZOFRAN-ODT) ODT tab 4 mg (has no administration in time range)     Or   ondansetron (ZOFRAN) injection 4 mg (has no administration in time range)   venlafaxine (EFFEXOR-XR) 24 hr capsule 225 mg (has no administration in time range)   0.9% sodium chloride BOLUS (0 mLs Intravenous Stopped 4/13/21 1557)   0.9% sodium chloride BOLUS (0 mLs Intravenous Stopped 4/13/21 1648)   iopamidol (ISOVUE-370) solution 135 mL (135 mLs Intravenous Given 4/13/21 1650)   sodium chloride (PF) 0.9% PF flush 84 mL (84 mLs Intravenous Given 4/13/21 1651)   gabapentin (NEURONTIN) capsule 900 mg (900 mg Oral Given 4/13/21 1828)   vancomycin (VANCOCIN) capsule 125 mg (125 mg Oral Given 4/13/21 1855)             Assessments & Plan (with Medical Decision Making)  46-year-old female who has history of immunosuppressive colitis due to treatment for cancers in the past before.  She is otherwise been relatively stable and was treated for strep throat last month developed diarrhea she thought it was possibly a colitis has had bleeding etc. with this.  Was seen at [facility] for this positive C. difficile started on oral vancomycin last Thursday.  Ongoing symptoms.  Patient continued diarrhea feeling weak somewhat lightheaded vitally stable otherwise work-up in the ER cardiac work-up negative other labs stable hemoglobin etc. is not on anticoagulants.  Inflammatory markers are negative also.  CT scan was done without any complications of colitis pneumatosis obstruction free air etc.  Discussed with GI they can see the patient in the morning  patient agrees will admit to observation overnight for ongoing hydration monitoring symptoms GI consultation and continue treatment patient given her gabapentin and vancomycin orally in the ER.         I have reviewed the nursing notes.    I have reviewed the findings, diagnosis, plan and need for follow up with the patient.    Current Discharge Medication List          Final diagnoses:   C. difficile colitis   Hematochezia   Dehydration   Diarrhea of infectious origin   I, Umair Carpio, am serving as a trained medical scribe to document services personally performed by Ismael Spangler MD, based on the provider's statements to me.      IIsmael MD, was physically present and have reviewed and verified the accuracy of this note documented by Umair Carpio.     4/13/2021   [facility] EMERGENCY DEPARTMENT    This note was created at least in part by the use of dragon voice dictation system. Inadvertent typographical errors may still exist.  Ismael Spangler MD.    Patient evaluated in the emergency department during the COVID-19 pandemic period. Careful attention to patients safety was addressed throughout the evaluation. Evaluation and treatment management was initiated with disposition made efficiently and appropriate as possible to minimize any risk of potential exposure to patient during this evaluation.       Ismael Spangler MD  04/13/21 [id number]

## 2021-04-13 NOTE — ED TRIAGE NOTES
"Triage Assessment & Note:    /71   Pulse 87   Temp 97.9  F (36.6  C) (Temporal)   Resp 16   Ht 1.588 m (5' 2.5\")   Wt 115.7 kg (255 lb)   SpO2 98%   BMI 45.90 kg/m        Patient presents with: Pt comes to triage with reports of CDIFF and rectal bleeding. No reports of fever, cough, SOB, CP, or travel.     Home Treatments/Remedies: Home medications    Febrile / Afebrile: afebrile    Duration of C/o: 2 wks    Rosibel Ovalle RN  April 13, 2021        "

## 2021-04-13 NOTE — LETTER
April 15, 2021      To Whom It May Concern:      Doretha Fernandez was seen in our Emergency Department today and observation unit 4/13-4/15/2021, please excuse her from work during this time and until 4/26/2021.  She may return to work on 4/26/2021.     Sincerely,        AMADA Jenkins CNP

## 2021-04-13 NOTE — ED NOTES
Orthos: taken on right lower leg (unable to use left arm d/t lymphedema, unable to use right arm d/t IV placement and fragility)  128/69 (MAP 86) (laying flat) - initially lightheaded when laying flat, reported significant dizziness upon transitioning from laying flat to sitting  161/87 () (sitting) - moderate dizziness reported in this position  223/106 () (standing) - reported lightheadedness and shakiness when standing

## 2021-04-14 ENCOUNTER — APPOINTMENT (OUTPATIENT)
Dept: CARDIOLOGY | Facility: CLINIC | Age: 46
End: 2021-04-14
Payer: COMMERCIAL

## 2021-04-14 LAB
ALBUMIN SERPL-MCNC: 3.5 G/DL (ref 3.4–5)
ALBUMIN SERPL-MCNC: 3.6 G/DL (ref 3.4–5)
ALP SERPL-CCNC: 45 U/L (ref 40–150)
ALP SERPL-CCNC: 47 U/L (ref 40–150)
ALT SERPL W P-5'-P-CCNC: 34 U/L (ref 0–50)
ALT SERPL W P-5'-P-CCNC: 36 U/L (ref 0–50)
ANION GAP SERPL CALCULATED.3IONS-SCNC: 7 MMOL/L (ref 3–14)
ANION GAP SERPL CALCULATED.3IONS-SCNC: 8 MMOL/L (ref 3–14)
AST SERPL W P-5'-P-CCNC: 16 U/L (ref 0–45)
AST SERPL W P-5'-P-CCNC: 17 U/L (ref 0–45)
BASOPHILS # BLD AUTO: 0.1 10E9/L (ref 0–0.2)
BASOPHILS # BLD AUTO: 0.1 10E9/L (ref 0–0.2)
BASOPHILS NFR BLD AUTO: 0.5 %
BASOPHILS NFR BLD AUTO: 0.8 %
BILIRUB SERPL-MCNC: 0.2 MG/DL (ref 0.2–1.3)
BILIRUB SERPL-MCNC: 0.3 MG/DL (ref 0.2–1.3)
BUN SERPL-MCNC: 10 MG/DL (ref 7–30)
BUN SERPL-MCNC: 12 MG/DL (ref 7–30)
CALCIUM SERPL-MCNC: 8.5 MG/DL (ref 8.5–10.1)
CALCIUM SERPL-MCNC: 8.5 MG/DL (ref 8.5–10.1)
CHLORIDE SERPL-SCNC: 106 MMOL/L (ref 94–109)
CHLORIDE SERPL-SCNC: 108 MMOL/L (ref 94–109)
CO2 SERPL-SCNC: 24 MMOL/L (ref 20–32)
CO2 SERPL-SCNC: 25 MMOL/L (ref 20–32)
CREAT SERPL-MCNC: 0.74 MG/DL (ref 0.52–1.04)
CREAT SERPL-MCNC: 0.76 MG/DL (ref 0.52–1.04)
CRP SERPL-MCNC: <2.9 MG/L (ref 0–8)
DIFFERENTIAL METHOD BLD: NORMAL
DIFFERENTIAL METHOD BLD: NORMAL
EOSINOPHIL # BLD AUTO: 0.2 10E9/L (ref 0–0.7)
EOSINOPHIL # BLD AUTO: 0.2 10E9/L (ref 0–0.7)
EOSINOPHIL NFR BLD AUTO: 1.6 %
EOSINOPHIL NFR BLD AUTO: 2.6 %
ERYTHROCYTE [DISTWIDTH] IN BLOOD BY AUTOMATED COUNT: 12.5 % (ref 10–15)
ERYTHROCYTE [DISTWIDTH] IN BLOOD BY AUTOMATED COUNT: 12.5 % (ref 10–15)
GFR SERPL CREATININE-BSD FRML MDRD: >90 ML/MIN/{1.73_M2}
GFR SERPL CREATININE-BSD FRML MDRD: >90 ML/MIN/{1.73_M2}
GLUCOSE BLDC GLUCOMTR-MCNC: 104 MG/DL (ref 70–99)
GLUCOSE BLDC GLUCOMTR-MCNC: 157 MG/DL (ref 70–99)
GLUCOSE BLDC GLUCOMTR-MCNC: 96 MG/DL (ref 70–99)
GLUCOSE BLDC GLUCOMTR-MCNC: 97 MG/DL (ref 70–99)
GLUCOSE SERPL-MCNC: 100 MG/DL (ref 70–99)
GLUCOSE SERPL-MCNC: 93 MG/DL (ref 70–99)
HCT VFR BLD AUTO: 36.9 % (ref 35–47)
HCT VFR BLD AUTO: 37.6 % (ref 35–47)
HGB BLD-MCNC: 11.9 G/DL (ref 11.7–15.7)
HGB BLD-MCNC: 12.5 G/DL (ref 11.7–15.7)
IMM GRANULOCYTES # BLD: 0.1 10E9/L (ref 0–0.4)
IMM GRANULOCYTES # BLD: 0.1 10E9/L (ref 0–0.4)
IMM GRANULOCYTES NFR BLD: 0.7 %
IMM GRANULOCYTES NFR BLD: 0.9 %
INTERPRETATION ECG - MUSE: NORMAL
LACTATE BLD-SCNC: 1.5 MMOL/L (ref 0.7–2)
LYMPHOCYTES # BLD AUTO: 2.8 10E9/L (ref 0.8–5.3)
LYMPHOCYTES # BLD AUTO: 3.2 10E9/L (ref 0.8–5.3)
LYMPHOCYTES NFR BLD AUTO: 29.3 %
LYMPHOCYTES NFR BLD AUTO: 36.8 %
MCH RBC QN AUTO: 30.8 PG (ref 26.5–33)
MCH RBC QN AUTO: 32 PG (ref 26.5–33)
MCHC RBC AUTO-ENTMCNC: 32.2 G/DL (ref 31.5–36.5)
MCHC RBC AUTO-ENTMCNC: 33.2 G/DL (ref 31.5–36.5)
MCV RBC AUTO: 96 FL (ref 78–100)
MCV RBC AUTO: 96 FL (ref 78–100)
MONOCYTES # BLD AUTO: 0.6 10E9/L (ref 0–1.3)
MONOCYTES # BLD AUTO: 0.8 10E9/L (ref 0–1.3)
MONOCYTES NFR BLD AUTO: 7.3 %
MONOCYTES NFR BLD AUTO: 7.7 %
NEUTROPHILS # BLD AUTO: 4 10E9/L (ref 1.6–8.3)
NEUTROPHILS # BLD AUTO: 6.6 10E9/L (ref 1.6–8.3)
NEUTROPHILS NFR BLD AUTO: 51.8 %
NEUTROPHILS NFR BLD AUTO: 60 %
NRBC # BLD AUTO: 0 10*3/UL
NRBC # BLD AUTO: 0 10*3/UL
NRBC BLD AUTO-RTO: 0 /100
NRBC BLD AUTO-RTO: 0 /100
PHOSPHATE SERPL-MCNC: 4 MG/DL (ref 2.5–4.5)
PLATELET # BLD AUTO: 262 10E9/L (ref 150–450)
PLATELET # BLD AUTO: 268 10E9/L (ref 150–450)
POTASSIUM SERPL-SCNC: 3.6 MMOL/L (ref 3.4–5.3)
POTASSIUM SERPL-SCNC: 3.7 MMOL/L (ref 3.4–5.3)
PROT SERPL-MCNC: 5.9 G/DL (ref 6.8–8.8)
PROT SERPL-MCNC: 6.3 G/DL (ref 6.8–8.8)
RBC # BLD AUTO: 3.86 10E12/L (ref 3.8–5.2)
RBC # BLD AUTO: 3.91 10E12/L (ref 3.8–5.2)
SODIUM SERPL-SCNC: 138 MMOL/L (ref 133–144)
SODIUM SERPL-SCNC: 140 MMOL/L (ref 133–144)
TROPONIN I SERPL-MCNC: <0.015 UG/L (ref 0–0.04)
TROPONIN I SERPL-MCNC: <0.015 UG/L (ref 0–0.04)
WBC # BLD AUTO: 10.9 10E9/L (ref 4–11)
WBC # BLD AUTO: 7.7 10E9/L (ref 4–11)

## 2021-04-14 PROCEDURE — 96374 THER/PROPH/DIAG INJ IV PUSH: CPT

## 2021-04-14 PROCEDURE — 93306 TTE W/DOPPLER COMPLETE: CPT | Mod: 26 | Performed by: INTERNAL MEDICINE

## 2021-04-14 PROCEDURE — 85025 COMPLETE CBC W/AUTO DIFF WBC: CPT | Performed by: PHYSICIAN ASSISTANT

## 2021-04-14 PROCEDURE — 255N000002 HC RX 255 OP 636: Performed by: INTERNAL MEDICINE

## 2021-04-14 PROCEDURE — 96375 TX/PRO/DX INJ NEW DRUG ADDON: CPT

## 2021-04-14 PROCEDURE — 83605 ASSAY OF LACTIC ACID: CPT | Performed by: PHYSICIAN ASSISTANT

## 2021-04-14 PROCEDURE — 999N000208 ECHOCARDIOGRAM COMPLETE

## 2021-04-14 PROCEDURE — 84484 ASSAY OF TROPONIN QUANT: CPT | Mod: 91 | Performed by: PHYSICIAN ASSISTANT

## 2021-04-14 PROCEDURE — 250N000011 HC RX IP 250 OP 636: Performed by: NURSE PRACTITIONER

## 2021-04-14 PROCEDURE — 999N000127 HC STATISTIC PERIPHERAL IV START W US GUIDANCE

## 2021-04-14 PROCEDURE — 93005 ELECTROCARDIOGRAM TRACING: CPT

## 2021-04-14 PROCEDURE — G0378 HOSPITAL OBSERVATION PER HR: HCPCS

## 2021-04-14 PROCEDURE — 250N000013 HC RX MED GY IP 250 OP 250 PS 637: Performed by: EMERGENCY MEDICINE

## 2021-04-14 PROCEDURE — 86140 C-REACTIVE PROTEIN: CPT | Performed by: PHYSICIAN ASSISTANT

## 2021-04-14 PROCEDURE — 258N000003 HC RX IP 258 OP 636: Performed by: PHYSICIAN ASSISTANT

## 2021-04-14 PROCEDURE — 250N000012 HC RX MED GY IP 250 OP 636 PS 637: Performed by: PHYSICIAN ASSISTANT

## 2021-04-14 PROCEDURE — 96376 TX/PRO/DX INJ SAME DRUG ADON: CPT

## 2021-04-14 PROCEDURE — 99220 PR INITIAL OBSERVATION CARE,LEVEL III: CPT | Performed by: PHYSICIAN ASSISTANT

## 2021-04-14 PROCEDURE — 999N001017 HC STATISTIC GLUCOSE BY METER IP

## 2021-04-14 PROCEDURE — 84100 ASSAY OF PHOSPHORUS: CPT | Performed by: PHYSICIAN ASSISTANT

## 2021-04-14 PROCEDURE — C9113 INJ PANTOPRAZOLE SODIUM, VIA: HCPCS | Performed by: NURSE PRACTITIONER

## 2021-04-14 PROCEDURE — 85025 COMPLETE CBC W/AUTO DIFF WBC: CPT | Performed by: NURSE PRACTITIONER

## 2021-04-14 PROCEDURE — 36415 COLL VENOUS BLD VENIPUNCTURE: CPT | Performed by: PHYSICIAN ASSISTANT

## 2021-04-14 PROCEDURE — 80053 COMPREHEN METABOLIC PANEL: CPT | Mod: 91 | Performed by: PHYSICIAN ASSISTANT

## 2021-04-14 PROCEDURE — 250N000013 HC RX MED GY IP 250 OP 250 PS 637: Performed by: PHYSICIAN ASSISTANT

## 2021-04-14 RX ORDER — SACCHAROMYCES BOULARDII 250 MG
250 CAPSULE ORAL 2 TIMES DAILY
Status: DISCONTINUED | OUTPATIENT
Start: 2021-04-14 | End: 2021-04-15 | Stop reason: HOSPADM

## 2021-04-14 RX ORDER — NICOTINE POLACRILEX 4 MG
15-30 LOZENGE BUCCAL
Status: DISCONTINUED | OUTPATIENT
Start: 2021-04-14 | End: 2021-04-15 | Stop reason: HOSPADM

## 2021-04-14 RX ORDER — LOPERAMIDE HCL 2 MG
2 CAPSULE ORAL 2 TIMES DAILY PRN
Status: DISCONTINUED | OUTPATIENT
Start: 2021-04-14 | End: 2021-04-14

## 2021-04-14 RX ORDER — LORAZEPAM 0.5 MG/1
0.5 TABLET ORAL ONCE
Status: COMPLETED | OUTPATIENT
Start: 2021-04-14 | End: 2021-04-14

## 2021-04-14 RX ORDER — DEXTROSE MONOHYDRATE 25 G/50ML
25-50 INJECTION, SOLUTION INTRAVENOUS
Status: DISCONTINUED | OUTPATIENT
Start: 2021-04-14 | End: 2021-04-15 | Stop reason: HOSPADM

## 2021-04-14 RX ORDER — LOPERAMIDE HCL 2 MG
2 CAPSULE ORAL 3 TIMES DAILY PRN
Status: DISCONTINUED | OUTPATIENT
Start: 2021-04-14 | End: 2021-04-15

## 2021-04-14 RX ADMIN — HUMAN ALBUMIN MICROSPHERES AND PERFLUTREN 5 ML: 10; .22 INJECTION, SOLUTION INTRAVENOUS at 10:13

## 2021-04-14 RX ADMIN — GABAPENTIN 900 MG: 300 CAPSULE ORAL at 12:33

## 2021-04-14 RX ADMIN — PANTOPRAZOLE SODIUM 40 MG: 40 INJECTION, POWDER, FOR SOLUTION INTRAVENOUS at 12:27

## 2021-04-14 RX ADMIN — HYDROXYZINE HYDROCHLORIDE 25 MG: 25 TABLET, FILM COATED ORAL at 17:59

## 2021-04-14 RX ADMIN — GABAPENTIN 900 MG: 300 CAPSULE ORAL at 15:27

## 2021-04-14 RX ADMIN — PROCHLORPERAZINE EDISYLATE 5 MG: 5 INJECTION INTRAMUSCULAR; INTRAVENOUS at 19:14

## 2021-04-14 RX ADMIN — Medication 250 MG: at 08:51

## 2021-04-14 RX ADMIN — VENLAFAXINE HYDROCHLORIDE 225 MG: 75 CAPSULE, EXTENDED RELEASE ORAL at 08:53

## 2021-04-14 RX ADMIN — LORAZEPAM 0.5 MG: 0.5 TABLET ORAL at 01:01

## 2021-04-14 RX ADMIN — Medication 250 MG: at 19:12

## 2021-04-14 RX ADMIN — VANCOMYCIN HYDROCHLORIDE 125 MG: 125 CAPSULE ORAL at 08:51

## 2021-04-14 RX ADMIN — PANTOPRAZOLE SODIUM 40 MG: 40 INJECTION, POWDER, FOR SOLUTION INTRAVENOUS at 19:15

## 2021-04-14 RX ADMIN — VANCOMYCIN HYDROCHLORIDE 125 MG: 125 CAPSULE ORAL at 12:27

## 2021-04-14 RX ADMIN — GABAPENTIN 900 MG: 300 CAPSULE ORAL at 09:03

## 2021-04-14 RX ADMIN — SODIUM CHLORIDE: 9 INJECTION, SOLUTION INTRAVENOUS at 18:41

## 2021-04-14 RX ADMIN — ACETAMINOPHEN 1000 MG: 500 TABLET, FILM COATED ORAL at 15:27

## 2021-04-14 RX ADMIN — VANCOMYCIN HYDROCHLORIDE 125 MG: 125 CAPSULE ORAL at 19:12

## 2021-04-14 RX ADMIN — SODIUM CHLORIDE: 9 INJECTION, SOLUTION INTRAVENOUS at 11:02

## 2021-04-14 RX ADMIN — ACETAMINOPHEN 1000 MG: 500 TABLET, FILM COATED ORAL at 09:07

## 2021-04-14 RX ADMIN — ROSUVASTATIN CALCIUM 10 MG: 10 TABLET, FILM COATED ORAL at 08:54

## 2021-04-14 RX ADMIN — VANCOMYCIN HYDROCHLORIDE 125 MG: 125 CAPSULE ORAL at 15:27

## 2021-04-14 RX ADMIN — GABAPENTIN 900 MG: 300 CAPSULE ORAL at 19:12

## 2021-04-14 RX ADMIN — PREDNISONE 5 MG: 5 TABLET ORAL at 08:51

## 2021-04-14 NOTE — PLAN OF CARE
"BP (!) 145/76 (BP Location: Right leg)   Pulse 56   Temp 98.4  F (36.9  C) (Oral)   Resp 20   Ht 1.588 m (5' 2.5\")   Wt 115.7 kg (255 lb)   SpO2 98%   BMI 45.90 kg/m      -diagnostic tests and consults completed and resulted - not met  -vital signs normal or at patient baseline - not met, pt hypertensive  -tolerating oral intake to maintain hydration - in progress  -adequate pain control on oral analgesics - pt reports left anterior chest pain   -tolerating oral antibiotics or has plans for home infusion setup - not met   -infection is improving - in progress  -dyspnea improved and O2 sats greater than 88% on room air or prior home oxygen levels - pt denies SOB, O2 sat 98%  -returns to baseline functional status - not met   -GI consult completed - not met. Consult in AM   Nurse to notify provider when observation goals have been met and patient is ready for discharge.  "

## 2021-04-14 NOTE — PROGRESS NOTES
-diagnostic tests and consults completed and resulted - Met   -vital signs normal or at patient baseline - Met  -tolerating oral intake to maintain hydration - Met  -adequate pain control on oral analgesics - Met  -tolerating oral antibiotics or has plans for home infusion setup - Met  -infection is improving - In progress  -dyspnea improved and O2 sats greater than 88% on room air or prior home oxygen levels - Met  -returns to baseline functional status - Met   -GI consult completed - Met

## 2021-04-14 NOTE — UTILIZATION REVIEW
"Admission Status; Secondary Review Determination     Admission Date: 4/13/2021 12:57 PM       Under the authority of the Utilization Management Committee, the utilization review process indicated a secondary review on the above patient.  The review outcome is based on review of the medical records, discussions with staff, and applying clinical experience noted on the date of the review.          (x) Observation Status Appropriate - This patient does not meet hospital inpatient criteria and is placed in observation status. If this patient's primary payer is Medicare and was admitted as an inpatient, Condition Code 44 should be used and patient status changed to \"observation\".       RATIONALE FOR DETERMINATION      Brief clinical presentation, information copied from the chart, abbreviated and edited for relevant content:     Doretha Fernandez is a 46 year old female with a past medical history significant for metastatic malignant melanoma of the lymph nodes of the axillae and upper limb (10/2017), s/p left axillary lymphadenectomy, lymphedema of the left arm (treated with lymphedema machine), ulcerative colitis, rheumatoid arthritis (immunosuppressed on prednisone), type 2 iatrogenic diabetes mellitus, drug-induced Cushing's syndrome, strep throat (treated with penicillin 500 mg) c/b C. difficile colitis who presented to the Emergency Department for evaluation of ongoing rectal bleeding and diarrhea.  . Started on PO vanco 4/7/2021 with worsening diarrhea. Reports multiple episodes of bloody diarrhea every hour and has had some stool incontinence. She remained symptomatic.  In ED, HR 50's-90's, BP 130s-220s/60s-100s. Labs show normal CMP with BUN 15, CR 0.71. Lactic acid 2.7.  Repeat lactic acid after 2 L of fluids is 2.1.  Blood cultures sent and pending.  CT abdomen pelvis with contrast reports no evidence of active colitis or complications of C. difficile colitis; moderate amount of semisolid stool throughout the " colon.  Chest x-ray negative. In the ED the patient was given 2 L NS bolus, vancomycin p.o. 125 mg x 1.  Currently- still on  Continue oral Vancomycin 125mg PO QID with IVF support. Not meeting IP guidelines.         The information on this document is developed by the utilization review team in order for the business office to ensure compliance.  This only denotes the appropriateness of proper admission status and does not reflect the quality of care rendered.         The definitions of Inpatient Status and Observation Status used in making the determination above are those provided in the CMS Coverage Manual, Chapter 1 and Chapter 6, section 70.4.      Sincerely,     Racquel Terrazas MD   Utilization Review/ Case Management  [facility].

## 2021-04-14 NOTE — TELEPHONE ENCOUNTER
Pt was admitted to the hospital and currently @ the [facility] [[email or web address]] AMANDA Rodriguez

## 2021-04-14 NOTE — PROGRESS NOTES
[facility]  Emergency Department Observation Unit Daily Progress Note          Assessment & Plan:   Doretha Fernandez is a 46 year old female with a past medical history significant for metastatic malignant melanoma of the lymph nodes of the axillae and upper limb (10/2017), s/p left axillary lymphadenectomy, lymphedema of the left arm (treated with lymphedema machine), ulcerative colitis, rheumatoid arthritis (immunosuppressed on prednisone), type 2 iatrogenic diabetes mellitus, drug-induced Cushing's syndrome, strep throat (treated with penicillin 500 mg) c/b C. difficile colitis who presented to the Emergency Department for evaluation of ongoing rectal bleeding and diarrhea.          ##C Difficile Colitis:  ##BRBPR:   Adopted from [facility] On 3/19/21 the patient saw her PCP who prescribed PCN x 10day d/t c/o stomach discomfort, sore throat, swollen glands, feeling like previous strep considering immunosuppressed status. D/c'ed after 7 days 2/2 improvement and diarrhea. Stool studies ordered by Dr. Schoenoff with GI at [facility] on 4/5/2021 which returned C. difficile positive. Started on PO vanco 4/7/2021 with worsening diarrhea. Reports multiple episodes of bloody diarrhea every hour and has had some stool incontinence. She reports headaches, lightheadedness and shortness of breath.  Denies fever, chills, night sweats, nausea, vomiting, abdominal pain, chest pain, shortness of breath. Patient is reportedly due for another flexible sigmoidoscopy on Friday (4/16/2021).  In ED, HR 50's-90's, BP 130s-220s/60s-100s, RR 16, SaO2 94-98% on RA, Temp 97.9. Labs show normal CMP with BUN 15, CR 0.71.  Normal lipase, BNP, CRP, CBC, INR.  Lactic acid 2.7.  Repeat lactic acid after 2 L of fluids is 2.1.  Blood cultures sent and pending.  Covid 19/RSV/flu PCR negative.  CT abdomen pelvis with contrast reports no evidence of active colitis or complications of C. difficile colitis; moderate amount of  semisolid stool throughout the colon.  Chest x-ray negative. In the ED the patient was given 2 L NS bolus, vancomycin p.o. 125 mg x 1.  Patient was discussed with GI in the ED who agrees to see patient in a.m.  Patient has been admitted to the ED observation unit for GI consultation as well as ongoing hydration. Hgb trend 13.1--> 12.5--> 11.9.   - Continue oral Vancomycin 125mg PO QID  - GI consult  - Probiotics  - Strict I/O  - MIVF with NS at 125ml/hr  - Trend  CBC, CMP   - PPI BID      ##Chest Pain: Shortly after admission to the observation unit patient complains of left-sided chest pain, pressure radiation to left shoulder and arm.  Denies shortness of breath, lightheadedness, palpitations, diaphoresis, nausea, vomiting.  No previous history of CAD, DVT, PE.  Normal echocardiogram 2/10/2020.  Serial troponins q4h negative x 3. EKG is w/o ischemic changes.   - Continuous telemetry  - Resting Echo   - Consider further work-up when GI issues resolved      ##Seronegative RA  ##Adjuvant Immunotherapy Induced Severe Colitis  2/2 Nivolumab s/p 8 cycles completed Feb 22,2018. Responded to a dose of Remicade after poor response to high dose steroids and 5-ASA. Transitioned to [facility] as outpatient  - On Actemra  - Continue with PTA Prednisone     ##DM2, steroid induced: Last A1c was 6.1 on 4/5/21. On Metformin QD   - Hold Metformin x48 hours due to contrast study done  - NPO at 4am   - BG checks q4h with sliding scale insulin Novolog medium resistance  - Hypoglycemic protocol     ##Metastatic malignant melanoma:  Diagnosed on LN bx in Oct 2017. Primary site not identified. Followed by Dr. Richards. Has been on treatment with nivolumab since Nov every 2 weeks. Last dose was #7 on 2/15. Nivolumab currently on hold d/t persistent bloody stools. Dr. Richards aware of her admission.      ##Depression.  ##Anxiety.   -Continue PTA Venlafaxine.     ##Moderate STORMY:   - CPAP     ##RLS:   - Continue with PTA Gabapentin        FEN:  "NPO  Lines: PIV  Prophylaxis: Early ambulation           Consults:   GI         Discharge Planning:   Pending GI work-up        Interval History:    Resting in bed. Notes abdomen is sore. Notes 2 episodes of bloody diarrhea overnight     ROS:   Constitutional: No fevers/chills.  Cardiovascular: No chest pain or palpitations.   Respiratory: No cough or SOB.   : No urinary complaints.   Musculoskeletal: Denies pain.           Physical Exam:   /72 (BP Location: Right leg)   Pulse 83   Temp 98.2  F (36.8  C) (Oral)   Resp 17   Ht 1.588 m (5' 2.5\")   Wt 115.7 kg (255 lb)   SpO2 98%   BMI 45.90 kg/m       GENERAL: Alert and oriented x 3. NAD.   HEENT: Anicteric sclera. Mucous membranes moist.   CV: RRR. S1, S2. No murmurs appreciated.   RESPIRATORY: Effort normal. Lungs CTAB with no wheezing, rales, rhonchi.   GI: Abdomen soft and non distended with normoactive bowel sounds present in all quadrants. No tenderness, rebound, guarding.   NEUROLOGICAL: No focal deficits. Moves all extremities.    EXTREMITIES: No peripheral edema. Intact bilateral pedal pulses.   SKIN: No jaundice. No rashes.     Medication list reviewed.   Today's labs and imaging were reviewed.     AMADA Hastings, CNP  Emergency Department Observation Unit    Results for orders placed or performed during the hospital encounter of 04/13/21   XR Chest Port 1 View     Status: None    Narrative    Exam: XR CHEST PORT 1 VW, 4/13/2021 1:45 PM    Indication: sob and light headedness with rectal bleeding    Comparison: 11/15/2019    Findings:   Heart and pulmonary vasculature within normal limits. Pleural spaces  are clear. Lungs are clear.      Impression    Impression: No cardiopulmonary disease identified.    ASIA ARAGON MD   CT Abdomen Pelvis w Contrast     Status: None    Narrative    EXAMINATION: CT ABDOMEN PELVIS W CONTRAST, 4/13/2021 4:59 PM    TECHNIQUE:  Helical CT images from the lung bases through the  symphysis pubis were obtained " with IV contrast. Contrast dose:  iopamidol (ISOVUE-370) solution 135 mL     COMPARISON: c diff with ongoing rectal bleeding and diarrhea with  elevated lactic acid    HISTORY: c diff with ongoing rectal bleeding and diarrhea with  elevated lactic acid.    FINDINGS::     Redundant sigmoid colon. A few scattered colonic diverticuli. No  adjacent inflammatory change. Moderate amount of semisolid stool  throughout the colon.    Liver: Normal parenchymal attenuation without focal mass.  Biliary system: Gallbladder is within normal limits. No intrahepatic  or extrahepatic biliary ductal dilatation.  Pancreas: Significantly atrophic and fatty replaced. No ductal  dilation or mass.  Stomach: Within normal limits.  Spleen: Within normal limits.  Adrenal glands: Within normal limits.  Kidneys: No focal mass, hydronephrosis, or stone.  Bladder: Within normal limits.  Reproductive organs: Innumerable tiny follicles on the ovaries. Uterus  is normal.  Appendix: Appendectomy stump appears normal.  Small Bowel: Small air-filled duodenal diverticulum extending off the  superior aspect of the third portion the duodenum.  Lymph nodes: No intra-abdominal or pelvic lymphadenopathy.  Vasculature: Within normal limits.    Lung bases: Mild atelectatic changes in the lungs. Cardiac size is  normal without pericardial effusion..    Bones and soft tissues: No suspicious soft tissue mass or fluid  collection. No suspicious osseous lesion. Small fat filled umbilical  hernia.      Impression    IMPRESSION:   1. No evidence for active colitis or complications of C. difficile  colitis. Moderate amount of semisolid stool throughout the colon.   2. No other acute findings in abdomen or pelvis.  3. Incidental findings as above.    I have personally reviewed the examination and initial interpretation  and I agree with the findings.    JOVI VEGA MD   CBC with platelets differential     Status: None   Result Value Ref Range    WBC 10.2 4.0 - 11.0  10e9/L    RBC Count 4.17 3.8 - 5.2 10e12/L    Hemoglobin 13.1 11.7 - 15.7 g/dL    Hematocrit 40.4 35.0 - 47.0 %    MCV 97 78 - 100 fl    MCH 31.4 26.5 - 33.0 pg    MCHC 32.4 31.5 - 36.5 g/dL    RDW 12.4 10.0 - 15.0 %    Platelet Count 281 150 - 450 10e9/L    Diff Method Automated Method     % Neutrophils 73.5 %    % Lymphocytes 16.8 %    % Monocytes 6.5 %    % Eosinophils 1.0 %    % Basophils 0.7 %    % Immature Granulocytes 1.5 %    Nucleated RBCs 0 0 /100    Absolute Neutrophil 7.5 1.6 - 8.3 10e9/L    Absolute Lymphocytes 1.7 0.8 - 5.3 10e9/L    Absolute Monocytes 0.7 0.0 - 1.3 10e9/L    Absolute Eosinophils 0.1 0.0 - 0.7 10e9/L    Absolute Basophils 0.1 0.0 - 0.2 10e9/L    Abs Immature Granulocytes 0.2 0 - 0.4 10e9/L    Absolute Nucleated RBC 0.0    INR     Status: None   Result Value Ref Range    INR 0.91 0.86 - 1.14   Partial thromboplastin time     Status: None   Result Value Ref Range    PTT 22 22 - 37 sec   CRP inflammation     Status: None   Result Value Ref Range    CRP Inflammation 3.0 0.0 - 8.0 mg/L   Erythrocyte sedimentation rate auto     Status: None   Result Value Ref Range    Sed Rate 4 0 - 20 mm/h   Comprehensive metabolic panel     Status: Abnormal   Result Value Ref Range    Sodium 138 133 - 144 mmol/L    Potassium 4.3 3.4 - 5.3 mmol/L    Chloride 107 94 - 109 mmol/L    Carbon Dioxide 25 20 - 32 mmol/L    Anion Gap 6 3 - 14 mmol/L    Glucose 99 70 - 99 mg/dL    Urea Nitrogen 15 7 - 30 mg/dL    Creatinine 0.71 0.52 - 1.04 mg/dL    GFR Estimate >90 >60 mL/min/[1.73_m2]    GFR Estimate If Black >90 >60 mL/min/[1.73_m2]    Calcium 9.8 8.5 - 10.1 mg/dL    Bilirubin Total 0.1 (L) 0.2 - 1.3 mg/dL    Albumin 3.8 3.4 - 5.0 g/dL    Protein Total 7.0 6.8 - 8.8 g/dL    Alkaline Phosphatase 50 40 - 150 U/L    ALT 42 0 - 50 U/L    AST 20 0 - 45 U/L   Magnesium     Status: None   Result Value Ref Range    Magnesium 2.1 1.6 - 2.3 mg/dL   Lipase     Status: Abnormal   Result Value Ref Range    Lipase 53 (L) 73 -  393 U/L   Lactic acid whole blood     Status: Abnormal   Result Value Ref Range    Lactic Acid 2.7 (H) 0.7 - 2.0 mmol/L   UA with Microscopic reflex to Culture     Status: Abnormal    Specimen: Urine Midstream; Midstream Urine   Result Value Ref Range    Color Urine Straw     Appearance Urine Clear     Glucose Urine Negative NEG^Negative mg/dL    Bilirubin Urine Negative NEG^Negative    Ketones Urine Negative NEG^Negative mg/dL    Specific Gravity Urine 1.007 1.003 - 1.035    Blood Urine Negative NEG^Negative    pH Urine 7.0 5.0 - 7.0 pH    Protein Albumin Urine Negative NEG^Negative mg/dL    Urobilinogen mg/dL Normal 0.0 - 2.0 mg/dL    Nitrite Urine Negative NEG^Negative    Leukocyte Esterase Urine Negative NEG^Negative    Source Midstream Urine     WBC Urine <1 0 - 5 /HPF    RBC Urine 0 0 - 2 /HPF    Bacteria Urine Few (A) NEG^Negative /HPF    Squamous Epithelial /HPF Urine 1 0 - 1 /HPF   Troponin I     Status: None   Result Value Ref Range    Troponin I ES <0.015 0.000 - 0.045 ug/L   Nt probnp inpatient (BNP)     Status: None   Result Value Ref Range    N-Terminal Pro BNP Inpatient 13 0 - 450 pg/mL   Lactic acid     Status: Abnormal   Result Value Ref Range    Lactic Acid 2.1 (H) 0.7 - 2.0 mmol/L   Asymptomatic Influenza A/B & SARS-CoV2 (COVID-19) Virus PCR Multiplex     Status: None    Specimen: Nasopharyngeal   Result Value Ref Range    Flu A/B & SARS-COV-2 PCR Source Nasopharyngeal     SARS-CoV-2 PCR Result NEGATIVE     Influenza A PCR Negative NEG^Negative    Influenza B PCR Negative NEG^Negative    Respiratory Syncytial Virus PCR Negative NEG^Negative    Flu A/B & SARS-CoV-2 PCR Comment (Note)    Lactic acid whole blood     Status: None   Result Value Ref Range    Lactic Acid 1.5 0.7 - 2.0 mmol/L   Comprehensive metabolic panel     Status: Abnormal   Result Value Ref Range    Sodium 138 133 - 144 mmol/L    Potassium 3.6 3.4 - 5.3 mmol/L    Chloride 106 94 - 109 mmol/L    Carbon Dioxide 24 20 - 32 mmol/L     Anion Gap 8 3 - 14 mmol/L    Glucose 100 (H) 70 - 99 mg/dL    Urea Nitrogen 12 7 - 30 mg/dL    Creatinine 0.74 0.52 - 1.04 mg/dL    GFR Estimate >90 >60 mL/min/[1.73_m2]    GFR Estimate If Black >90 >60 mL/min/[1.73_m2]    Calcium 8.5 8.5 - 10.1 mg/dL    Bilirubin Total 0.2 0.2 - 1.3 mg/dL    Albumin 3.6 3.4 - 5.0 g/dL    Protein Total 6.3 (L) 6.8 - 8.8 g/dL    Alkaline Phosphatase 47 40 - 150 U/L    ALT 36 0 - 50 U/L    AST 16 0 - 45 U/L   CBC with platelets differential     Status: None   Result Value Ref Range    WBC 10.9 4.0 - 11.0 10e9/L    RBC Count 3.91 3.8 - 5.2 10e12/L    Hemoglobin 12.5 11.7 - 15.7 g/dL    Hematocrit 37.6 35.0 - 47.0 %    MCV 96 78 - 100 fl    MCH 32.0 26.5 - 33.0 pg    MCHC 33.2 31.5 - 36.5 g/dL    RDW 12.5 10.0 - 15.0 %    Platelet Count 268 150 - 450 10e9/L    Diff Method Automated Method     % Neutrophils 60.0 %    % Lymphocytes 29.3 %    % Monocytes 7.7 %    % Eosinophils 1.6 %    % Basophils 0.5 %    % Immature Granulocytes 0.9 %    Nucleated RBCs 0 0 /100    Absolute Neutrophil 6.6 1.6 - 8.3 10e9/L    Absolute Lymphocytes 3.2 0.8 - 5.3 10e9/L    Absolute Monocytes 0.8 0.0 - 1.3 10e9/L    Absolute Eosinophils 0.2 0.0 - 0.7 10e9/L    Absolute Basophils 0.1 0.0 - 0.2 10e9/L    Abs Immature Granulocytes 0.1 0 - 0.4 10e9/L    Absolute Nucleated RBC 0.0    CRP inflammation     Status: None   Result Value Ref Range    CRP Inflammation <2.9 0.0 - 8.0 mg/L   Troponin I     Status: None   Result Value Ref Range    Troponin I ES <0.015 0.000 - 0.045 ug/L   Troponin I     Status: None   Result Value Ref Range    Troponin I ES <0.015 0.000 - 0.045 ug/L   Comprehensive metabolic panel     Status: Abnormal   Result Value Ref Range    Sodium 140 133 - 144 mmol/L    Potassium 3.7 3.4 - 5.3 mmol/L    Chloride 108 94 - 109 mmol/L    Carbon Dioxide 25 20 - 32 mmol/L    Anion Gap 7 3 - 14 mmol/L    Glucose 93 70 - 99 mg/dL    Urea Nitrogen 10 7 - 30 mg/dL    Creatinine 0.76 0.52 - 1.04 mg/dL    GFR  Estimate >90 >60 mL/min/[1.73_m2]    GFR Estimate If Black >90 >60 mL/min/[1.73_m2]    Calcium 8.5 8.5 - 10.1 mg/dL    Bilirubin Total 0.3 0.2 - 1.3 mg/dL    Albumin 3.5 3.4 - 5.0 g/dL    Protein Total 5.9 (L) 6.8 - 8.8 g/dL    Alkaline Phosphatase 45 40 - 150 U/L    ALT 34 0 - 50 U/L    AST 17 0 - 45 U/L   Phosphorus     Status: None   Result Value Ref Range    Phosphorus 4.0 2.5 - 4.5 mg/dL   Glucose by meter     Status: None   Result Value Ref Range    Glucose 96 70 - 99 mg/dL   CBC with platelets differential     Status: None   Result Value Ref Range    WBC 7.7 4.0 - 11.0 10e9/L    RBC Count 3.86 3.8 - 5.2 10e12/L    Hemoglobin 11.9 11.7 - 15.7 g/dL    Hematocrit 36.9 35.0 - 47.0 %    MCV 96 78 - 100 fl    MCH 30.8 26.5 - 33.0 pg    MCHC 32.2 31.5 - 36.5 g/dL    RDW 12.5 10.0 - 15.0 %    Platelet Count 262 150 - 450 10e9/L    Diff Method Automated Method     % Neutrophils 51.8 %    % Lymphocytes 36.8 %    % Monocytes 7.3 %    % Eosinophils 2.6 %    % Basophils 0.8 %    % Immature Granulocytes 0.7 %    Nucleated RBCs 0 0 /100    Absolute Neutrophil 4.0 1.6 - 8.3 10e9/L    Absolute Lymphocytes 2.8 0.8 - 5.3 10e9/L    Absolute Monocytes 0.6 0.0 - 1.3 10e9/L    Absolute Eosinophils 0.2 0.0 - 0.7 10e9/L    Absolute Basophils 0.1 0.0 - 0.2 10e9/L    Abs Immature Granulocytes 0.1 0 - 0.4 10e9/L    Absolute Nucleated RBC 0.0    EKG 12-lead, tracing only     Status: None   Result Value Ref Range    Interpretation ECG Click View Image link to view waveform and result    EKG 12-lead, complete     Status: None (Preliminary result)   Result Value Ref Range    Interpretation ECG Click View Image link to view waveform and result    hCG qual urine POCT     Status: Normal   Result Value Ref Range    HCG Qual Urine Negative neg    Internal QC OK Yes    ABO/Rh type and screen     Status: None   Result Value Ref Range    ABO A     RH(D) Pos     Antibody Screen Neg     Test Valid Only At          [facility]  [facility]    Specimen Expires 04/16/2021    Blood culture     Status: None (Preliminary result)    Specimen: Arm, Left; Blood    Left Arm   Result Value Ref Range    Specimen Description Blood Left Arm     Culture Micro No growth after 15 hours

## 2021-04-14 NOTE — PROGRESS NOTES
Patient arrived to Observation unit via litter from the [facility] and ambulated from the hallway to bed.

## 2021-04-14 NOTE — CONSULTS
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     # Watery diarrhea with intermittent bloody diarrhea  # First recurrence of C.difficile colitis, nonsevere form     --first episode 8 years ago, unknown rx regimen  # H/o checkpoint inhibitor induced colitis s/p Remicade  # RA on tocilizumab & Prednisone  Likely ongoing C.diff infection as she has obvious trigger event with recent penicillin use and recent e/o positive C.diff infection 4/6/21. Currently would be considered as nonsevere form(benign exams, normal labs and CT) which can be managed with antibiotic. Given her ongoing diarrhea, we suggest adding loperamide for symptomatic treatment; loperamide was traditionally avoided but e/o harm is equivocal, reserve use for patient without ileus/colonic distension who find it difficult to keep up with fluid loss. However, with no symptomatic improvements after 7 days of PO Vancomycin, other causes of colitis are also possible; other pathogens(eg. CMV?). Would recommend giving PO Vancomycin more time and will re-discuss for other potential causes if she fails conservative treatment.  - continue PO Vancomycin; recommend pulse-tapered regimen for 1st recurrence  - continue fluid hydration  - advance diet as tolerates  - schedule loperamide for supportive treatment; can consider TID/QID  - will consider further  work ups if doesn't improve with supportive treatment    Patient care plan discussed with Dr. Servin, GI staff physician.    Ezequiel Yangmelissa  PGY-1 Internal Medicine  GI luminal consult service  [id number]         Chief Complaint:   Bloody & watery diarrhea     History is obtained from the patient         History of Present Illness:   47 yo F with significant PMH of h/o nivolumab induced colitis, h/o giardia infection, h/o C.diff infection 8 years ago, RA on prednisone & tocilizumab, DMT2, Drug-induced cushing syndrome, recently treated for strep throat with penicillin *10 d c/b C.difficile colitis, now with ongoing watery diarrhea with intermittent bloody diarrhea & blood clot despite PO vanc.    Known case nivolumab induced colitis 2018 which was successfully treated with Remicade(infliximab, one dose in the hospital) and complete induction dosing with Entyvio(vedolizumab), now on remission. Previous medical therapies include: prednisone (Minimal response to high dose oral and IV, significant weight gain in Rodriguez feces), 5-ASA (No response, Lialda) and topical therapies (Enema therapy, no response). Denies any changes in her medications/new checkpoint inhibitor recently. He bowel movements have been formed and more on a constipated side since then.     Was started on penicillin 3/19/21 and stopped after 7 days given worsening diarrhea. She initially thought that it was from previous colitis but it didn't improve so she went in and found to have positive C.diff 4/5/21; other pathogens on enteric panel were all negative. PO vanc QID was started 4/7/21; she reports taking vancomycin regularly and as prescribed. However, she still has ongoing watery diarrhea ~ 10 times/day to the point that her anus feels irritated and now with incontinence. Intermittently notices blood clots and sometimes with red watery diarrhea. Her diarrhea is usually less during the night. Reports having lightheadedness. Denies fever/chills.  Denies chest pain, abdominal pain, bloating, or N/V. Patient is reportedly due for another flexible sigmoidoscopy on Friday (2021).     CT abdomen pelvis with contrast  reports no evidence of active colitis or complications of C. difficile colitis; moderate amount of semisolid stool throughout the colon. GI consult placed for management recommendations.         Past Medical History:   Metastatic melanoma previously treated with Nivolumab  H/o Nivolumab induced colitis  H/o C.diff infection 8 years ago; also d/t Abx, unknown rx regimen  H/o Giardia infection  RA on prednisone & tocilizumab  DMT2  Drug-induced cushing syndrome          Past Surgical History:     Past Surgical History:   Procedure Laterality Date     APPENDECTOMY       COLONOSCOPY N/A 10/18/2017    Procedure: COLONOSCOPY;  Colon;  Surgeon: Debbie Stephens MD;  Location: [facility] OR     COLONOSCOPY N/A 3/9/2018    Procedure: COMBINED COLONOSCOPY, SINGLE OR MULTIPLE BIOPSY/POLYPECTOMY BY BIOPSY;  colon;  Surgeon: Benita Schumacher MD;  Location: [facility] GI     COLONOSCOPY      multiple since  to present - about 6 total     DISSECT LYMPH NODE AXILLA Left 10/23/2017    Procedure: DISSECT LYMPH NODE AXILLA;  Left Axillary Lymph Node Dissection ;  Surgeon: Laurent Cool MD;  Location: [facility] OR     EXAM UNDER ANESTHESIA PELVIC N/A 2020    Procedure: EXAM UNDER ANESTHESIA, PELVIS; with Cervical Biopsies, Vaginal Biopsy and Endocervical Curettings;  Surgeon: Melina Jung MD;  Location: [facility] OR     GYN SURGERY  ,          REPAIR MOHS Left 2017    Procedure: REPAIR MOHS;  Left Upper Lid Moh's Reconstruction;  Surgeon: Kisha Bosch MD;  Location: [facility] OR             Social History:     Social History     Tobacco Use     Smoking status: Former Smoker     Packs/day: 1.00     Years: 5.00     Pack years: 5.00     Quit date: 3/20/1998     Years since quittin.0     Smokeless tobacco: Never Used   Substance Use Topics     Alcohol  use: Yes     Comment: occ             Family History:     Family History   Problem Relation Age of Onset     Cancer Mother 45        lung     Neurologic Disorder Mother         epilepsy     Lipids Father      Gastrointestinal Disease Father         diverticulitis      Depression Father      Cancer Maternal Grandmother      Blood Disease Maternal Grandmother         lymphoma      Arthritis Maternal Grandmother      Diabetes Maternal Grandmother      Depression Maternal Grandmother      Macular Degeneration Maternal Grandmother      Glaucoma Maternal Grandmother      Diabetes Maternal Grandfather      Cerebrovascular Disease Maternal Grandfather      Blood Disease Maternal Grandfather      Heart Disease Maternal Grandfather      Glaucoma Maternal Grandfather      Cancer Paternal Grandmother      Cancer - colorectal Paternal Grandmother      Respiratory Paternal Grandfather         emphysema      Heart Disease Daughter      Asthma Daughter      Depression Sister      Melanoma No family hx of              Allergies:     Allergies   Allergen Reactions     Bee Venom Swelling     Azithromycin Diarrhea     Erythromycin      Other reaction(s): GI intolerance, Vomiting     Fentanyl Other (See Comments)     sweating  sweating     Prochlorperazine Fatigue     Other reaction(s): Other (see comments)  Fatigue     Buspirone      Other reaction(s): GI intolerance  vomiting     Erythrocin Nausea and Vomiting     Zithromax [Azithromycin Dihydrate] Diarrhea     Enbrel [Etanercept] Hives and Rash           Medications:     Medications Prior to Admission   Medication Sig Dispense Refill Last Dose     acetaminophen (TYLENOL) 500 MG tablet Take 1,000 mg by mouth every 8 hours as needed for mild pain   4/13/2021 at AM     Calcium Carb-Cholecalciferol 600-800 MG-UNIT TABS Take 800 mg by mouth daily before breakfast   4/13/2021     ferrous fumarate 65 mg, [place]. FE,-Vitamin C 125 mg (VITRON-C)  MG TABS tablet Take 1 tablet by mouth  daily 30 tablet 3 4/13/2021     gabapentin (NEURONTIN) 300 MG capsule TAKE THREE CAPSULES BY MOUTH FOUR TIMES A  capsule 0 4/13/2021     hydrOXYzine (ATARAX) 25 MG tablet TAKE ONE TABLET BY MOUTH AT BEDTIME (Patient taking differently: As needed) 90 tablet 1 Past Week     metFORMIN (GLUCOPHAGE-XR) 500 MG 24 hr tablet Take 1,000 mg by mouth Daily with breakfast.   4/13/2021     predniSONE (DELTASONE) 5 MG tablet Take 5 mg by mouth daily   4/13/2021     rosuvastatin (CRESTOR) 10 MG tablet Take 1 tablet (10 mg) by mouth daily 90 tablet 3 4/13/2021     tocilizumab (ACTEMRA) 162 MG/0.9ML subcutaneous injection Inject 162 mg Subcutaneous every 14 days   4/11/2021     vancomycin (VANCOCIN) 125 MG capsule Take 125 mg by mouth 4 times daily   4/13/2021     venlafaxine (EFFEXOR-ER) 225 MG 24 hr tablet TAKE ONE TABLET BY MOUTH ONCE DAILY 90 tablet 1 4/13/2021     VITAMIN D3 25 MCG (1000 UT) tablet TAKE THREE TABLETS BY MOUTH ONCE DAILY 300 tablet 1 4/13/2021     ACCU-CHEK GUIDE test strip USE TO TEST BLOOD SUGAR TWO TIMES A DAY OR AS DIRECTED 200 strip 0      blood glucose monitoring (ACCU-CHEK FASTCLIX) lancets USE TWO TIMES A DAY OR AS DIRECTED 102 each 9      ondansetron (ZOFRAN) 8 MG tablet Take 8 mg by mouth every 8 hours as needed for nausea (Pt has not taken in past year 2/10/20)    More than a month             Review of Systems:   The Review of Systems is negative other than noted in the HPI          Physical Exam:   Vitals were reviewed  Temp: 98.2  F (36.8  C) Temp src: Oral BP: 129/72 Pulse: 83   Resp: 17 SpO2: 98 % O2 Device: BiPAP/CPAP    GA: NAD, look anxious  HEENT: eric dry lips  Lungs: clear on auscultation both lungs  CVS: RRR, full peripheral pulse  Abd: normal bowel sound, soft, mildly tender at RLQ and lower umbilical area, no rebound or guarding  MIA: hemorrhoids seen, no active bleeding, normal sphincter tone  Neuro: A&O*3, otherwise grossly intact          Data:   IMAGINGS:  CT A&P 4/13/21   1.  No evidence for active colitis or complications of C. difficile  colitis. Moderate amount of semisolid stool throughout the colon.   2. No other acute findings in abdomen or pelvis.  3. Incidental findings as above.    LABS  WBC 10.9 --> 7.7  Hgb 12.5 --> 11.9  Plt 268 --> 262    BUN 10  Cr 0.76    TP 5.9  Alb 3.5    ESR 4, CRP<2.9  Lactic acid 1.5    Enteric panel 4/6/21 positive for C.diff, otherwise negative.    PREVIOUS PROCEDURES  Flex sigmoidoscopy 05/2018 for diarrhea with abnormal CT abdomen  -Findings:       The perianal and digital rectal examinations were normal.       The visualized rectal and colonic mucosa was diffusely abnormal. There        was loss of mucosal vascular pattern with shallow linear an oval        erosions and mucopurulent exudate. The mucosa was edematous causing mild        luminal narrowing. The scope was not advanced beyond 25 cm. Biopsies        were taken with a cold forceps for viral culture and histology. No        retroflexion was attempted.  -Surgical patholgy:   Final dianosis  A. Colon, Recto-sigmoid, endoscopic biopsy:  Fragments of  colonic mucosa with moderate activity, including erosions,  architectural distortion and increased chronic inflammation  within the lamina propria.  No dysplasia.  The differential  includes drug-associated injury (including Nivolumab  effect), inflammatory bowel disease, chronic infection and  diverticular disease-associated colitis. No CMV identified by immunostatin.    Colonoscopy 09/2018--for follow up left sided medication induced colitis  -Findings:       The perianal and digital rectal examinations were normal.       The terminal ileum appeared normal.       The cecum, transverse, and ascending colon appeared normal. Biopsies        were taken with a cold forceps for histology.       The descending and sigmoid colon appeared normal. Biopsies were taken        with a cold forceps for histology.       The rectal mucosa was very mildly  erythematous and hypervascular with        areas of diminished vascularity as well. Biopsies were obtained with a        cold forceps for histology.  -Surgical pathology:    Final diagnosis:       A. Colon, transverse, endoscopic biopsy:  Colonic mucosa without diagnostic abnormality.       B. Colon, Descending, Sigmoid, endoscopic biopsy:  Colonic mucosa with focal Paneth cell metaplasia, otherwise without diagnostic abnormality.       C. Rectum, endoscopic biopsy:  Colonic mucosa with slight crypt architectural disarray and focal Paneth cell metaplasia, otherwise without diagnostic abnormality.      Flex sigmoidoscopy 02/2019 for LLQ abdominal pain evaluation  -Findings:       The perianal and digital rectal examinations were normal.       The colon (entire examined portion) appeared normal. Biopsies were taken        with a cold forceps for histology.       The retroflexed view of the distal rectum and anal verge was normal and        showed no anal or rectal abnormalities.  -Surgical pathology      Final diagnosis:  A. Colon, left, endoscopic biopsy:  Colonic mucosa without  diagnostic abnormality. No lymphocytic or collagenous  colitis.  B. Duodenum, 2nd part, endoscopic biopsy:  Small bowel  mucosa without diagnostic abnormality. Villi and plasma  cells are present. No evidence of Whipple's disease, celiac  sprue, or Giardia.  C. Stomach, antrum, body, and incisura, endoscopic biopsy:  Normal antral and fundic mucosa.  No Helicobacter pylori.  Separate normal duodenal mucosa.

## 2021-04-14 NOTE — PROGRESS NOTES
4:38 PM The patient was seen and examined by me and the case was discussed with the ASHANTI. We are in agreement with the assessment and plan.    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      Anxiety      Basal cell carcinoma      Cervical high risk HPV (human papillomavirus) test positive 2019    See problem list     Colitis      Depression      Diabetes mellitus, iatrogenic (H) 2020     Inflammatory arthritis      Insomnia      Intestinal giardiasis 3/5/2018     Lumbago     left lower back pain     Lymphedema      Malignant melanoma (H)      Melanoma (H) 10/23/2017     Mild persistent asthma      Obesity      STORMY (obstructive sleep apnea)      Prothrombin deficiency (H)     takes 81mg asa daily     Stroke (cerebrum) (H)     During      TIA (transient ischemic attack)      Type 2 diabetes mellitus (H)      Social History     Socioeconomic History     Marital status:      Spouse name: Not on file     Number of children: Not on file     Years of education: Not on file     Highest education level: Not on file   Occupational History     Not on file   Social Needs     Financial resource strain: Not on file     Food insecurity     Worry: Not on file     Inability: Not on file     Transportation needs     Medical: Not on file     Non-medical: Not on file   Tobacco Use     Smoking status: Former Smoker     Packs/day: 1.00     Years: 5.00     Pack years: 5.00     Quit date: 3/20/1998     Years since quittin.0     Smokeless tobacco: Never Used   Substance and Sexual Activity     Alcohol use: Yes     Comment: occ     Drug use: No     Sexual activity: Not Currently     Partners: Male     Birth control/protection: Surgical   Lifestyle     Physical activity     Days per week: Not on file     Minutes per session: Not on file     Stress: Not on file   Relationships     Social connections     Talks on phone: Not on file      Gets together: Not on file     Attends Latter day service: Not on file     Active member of club or organization: Not on file     Attends meetings of clubs or organizations: Not on file     Relationship status: Not on file     Intimate partner violence     Fear of current or ex partner: Not on file     Emotionally abused: Not on file     Physically abused: Not on file     Forced sexual activity: Not on file   Other Topics Concern     Parent/sibling w/ CABG, MI or angioplasty before 65F 55M? No   Social History Narrative    20 y.o- patient's mother   of lung cancer. She had to take care of her younger sister.    2012- patient's  had a heart attack with stents placed, followed by cardiac rehabilitation    2000 TO 2012  was in [facility] for depression    2013 patient's  went through alcohol rehabilitation at [facility]            They have attended couple counseling a couple of times and patient went to the family program for chemical dependency.    Patient denies alcohol or drug use and herself            Has 2 children, girls ages 10 and 13     For a while she was working 3 jobs since her  was ill. 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 Reports her job is very stressful.         This is a 46-year-old female with a complex past history but admitted to observation through the ED because of C. difficile diarrhea.  She has been getting her care at the [facility] she started vancomycin 7 days ago and has not seen an improvement in her symptoms.  She has had colitis in the past and the CT done this admission did not show evidence for colitis.  Her hemoglobin has dropped somewhat appreciate GI consult she does not feel that she can get to male on Friday for her colonoscopy as planned.    Physical examination:  General: Awake and alert, mildly despondent.  Cardiac: Regular rate and rhythm no murmurs rubs  gallops  Respiratory: Lungs clear  Abdomen: Obese soft and nontender.    Assessment and plan: We will follow recommendations of GI continue the vancomycin they recommend adding loperamide which we will do.  She is frustrated with the ongoing diarrhea we will recheck her hemoglobin in the morning she can be discharged to continue the vancomycin and loperamide when the frequency of stooling has decreased.  She is not having fevers and no abdominal cramps.

## 2021-04-14 NOTE — H&P
[facility]    History and Physical - ED Observation Service       Date of Admission:  4/13/2021    Assessment & Plan   Doretha Fernandez is a 46 year old female admitted on 4/13/2021. She has a past medical history significant for metastatic malignant melanoma of the lymph nodes of the axillae and upper limb (10/2017), s/p left axillary lymphadenectomy, lymphedema of the left arm (treated with lymphedema machine), ulcerative colitis, rheumatoid arthritis (immunosuppressed on prednisone), type 2 iatrogenic diabetes mellitus, drug-induced Cushing's syndrome, strep throat (treated with penicillin 500 mg) c/b C. difficile colitis who presents to the Emergency Department for evaluation of ongoing rectal bleeding and diarrhea.      ##. C Difficile Colitis  ##. BRBPR   3/19/21 PCP prescribed PCN x 10day d/t c/o stomach discomfort, sore throat, swollen glands, feeling like previous strep considering immunosuppressed status. D/c'ed after 7 days 2/2 improvement and diarrhea. Stool studies ordered by Dr. Schoenoff with GI at [place] on 4/5/2021 which returned C. difficile positive. Started on PO vanco 4/7/2021 with worsening diarrhea. Reports multiple episodes of bloody diarrhea every hour and has had some stool incontinence. She reports headaches, lightheadedness and shortness of breath.  Denies fever, chills, night sweats, nausea, vomiting, abdominal pain, chest pain, shortness of breath. Patient is reportedly due for another flexible sigmoidoscopy on Friday (4/16/2021).  In ED, HR 50's-90's, BP 130s-220s/60s-100s, RR 16, SaO2 94-98% on RA, Temp 97.9. Labs show normal CMP with BUN 15, CR 0.71.  Normal lipase, BNP, CRP, CBC, INR.  Lactic acid 2.7.  Repeat lactic acid after 2 L of fluids is 2.1.  Blood cultures sent and pending.  Covid 19/RSV/flu PCR negative.  CT abdomen pelvis with contrast reports no evidence of active colitis or complications of C. difficile colitis; moderate amount  of semisolid stool throughout the colon.  Chest x-ray negative. In ED patient was given 2 L NS bolus, vancomycin p.o. 125 mg x 1.  Patient was discussed with GI in the ED who agrees to see patient in a.m.  Patient has been admitted to the ED observation unit for GI consultation as well as ongoing hydration.  - Continue oral Vancomycin 125mg PO QID  - GI consult  - Probiotics  - Protonix 40mg BID  - Strict I/O  - MIVF with NS at 125ml/hr  - Repeat CBC, CMP, CRP, lactic acid in AM     ##. Left Chest Pain: Shortly after admission to the observation unit patient complains of left-sided chest pain, pressure radiation to left shoulder and arm.  Denies shortness of breath, lightheadedness, palpitations, diaphoresis, nausea, vomiting, heartburn.  No previous history of CAD, DVT, PE.  Normal echocardiogram 2/10/2020. Risk Factors include obesity, HLD. Patient appears anxious and admits to feeling anxious.   - Serial troponins q4h x 2 more  - EKG stat  - Continuous telemetry  - Resting Echo in the morning  - Ativan 0.5mg po x 1  - Prophylaxis with Protonix 40mg IV BID    ##. Metastatic malignant melanoma of unknown primary     ##. 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 October 2017 mammogram revealed a suspicious left axillary mass which was biopsied by ultrasound on October 5, 2017 which revealed a malignant melanoma. PET-CT imaging revealed FDG-avid left axillary mass with no other disease. October 23, 2017, patient had left axillary lymphadenectomy by Dr. Cool at [facility]. November 7, 2017, patient was seen in our Melanoma Clinic at [place] and started on adjuvant immunotherapy with nivolumab s/p 8 cycles completed Feb 22, 2018. May 2018 was noted to have nivolumab-induced colitis, initially thought to have been related to giardiasis. She achieved complete response with treatment. In June 2020, PET-CT scan revealed no evidence of  metastatic melanoma. She is followed q6mths. Followed by Dr. Giron, Oncology at [place]. Currently in complete remission.    ##. Seronegative RA  ##. Adjuvant Immunotherapy Induced Severe Colitis  2/2 Nivolumab s/p 8 cycles completed Feb 22,2018. Responded to a dose of Remicade after poor response to high dose steroids and 5-ASA. Transitioned to Entyvio as outpatient  - On Actemra currently  - Continue with PTA Prednisone   - Follow with Rheumatology     ##. DM2, steroid induced: Last A1c was 6.1 on 4/5/21. On Metformin QD   - Hold Metformin x48 hours due to contrast study done  - NPO at 4am   - BG checks q4h with sliding scale insulin Novolog medium resistance  - Hypoglycemic protocol     ##. Depression  ##. Anxiety  -Continue PTA Venlafaxine.    ##. Moderate STORMY: - CPAP    ##. RLS: - Continue with PTA Gabapentin      Diet: NPO for Medical/Clinical Reasons Except for: Meds    DVT Prophylaxis: Pneumatic Compression Devices  Major Catheter: not present  Code Status: Full Code           Disposition Plan   Expected discharge: 2 - 3 days, recommended to prior living arrangement once antibiotic plan established and GI consult completed with dispo plan.  Entered: KEITH Henning 04/14/2021, 5:39 AM     The patient's care was discussed with the Attending Physician, Dr. Estrada.    KEITH Henning  [facility]  Contact information available via [facility] Paging/Directory      ______________________________________________________________________    Chief Complaint   Bloody stools    History is obtained from the patient    History of Present Illness     Doretha Fernandez is a 46 year old female with a past medical history significant for metastatic malignant melanoma of the lymph nodes of the axillae and upper limb (10/2017), s/p left axillary lymphadenectomy, lymphedema of the left arm (treated with lymphedema machine), ulcerative colitis, rheumatoid arthritis  (immunosuppressed on prednisone), type 2 iatrogenic diabetes mellitus, drug-induced Cushing's syndrome, presumed strep throat recently (treated with penicillin) c/b C. difficile colitis who presents to the Emergency Department for evaluation of ongoing rectal bleeding and diarrhea.      Patient was seen via virtual visit by her PCP on 3/19/21 and reported at that time that her stomach discomfort, sore throat, swollen glands, feeling like her previous experiences with strep. Given her immunosuppressed status, she was treated with Penicillin VK BID x 10 days. Patient states she took about 7 days worth and her throat was starting to already feel better and had developed some diarrhea and decided to discontinue use. Thought this may be related to colitis however was not improving and had associated bloody stools with clots. Stool studies ordered by Dr. Schoenoff with GI at [place] on 4/5/2021 which returned C. difficile positive.  She was started on oral vancomycin on 4/7/2021.  Patient states that diarrhea and bloody stool have actually worsened since then.  Reports multiple episodes of bloody diarrhea every hour and has had some stool incontinence. Reports clots about a dime size ~2-5 with each stool. Some BM's are just bloody and some are preceded with stool. She reports headaches, lightheadedness, fatigue, body aches and shortness of breath.  Denies fever, chills, night sweats, nausea, vomiting, abdominal pain, chest pain, shortness of breath. Patient is reportedly due for another flexible sigmoidoscopy on Friday (4/16/2021).      Patient reports that she has had a fairly longstanding history with immunosuppressive induced ulcerative colitis, which has been treated with a variety of medications.  This treatment has been successful and her ulcerative colitis has been under control for quite a while.  She relates this episode to be very similar to when she had immune related colitis.    In the ED, HR 50's-90's, BP  130s-220s/60s-100s, RR 16, SaO2 94-98% on RA, Temp 97.9. Labs show normal CMP with BUN 15, CR 0.71.  Normal lipase, BNP, CRP, CBC, INR.  Lactic acid 2.7.  Repeat lactic acid after 2 L of fluids is 2.1.  Blood cultures sent and pending.  Covid 19/RSV/flu PCR negative.  CT abdomen pelvis with contrast reports no evidence of active colitis or complications of C. difficile colitis; moderate amount of semisolid stool throughout the colon.  Chest x-ray negative. In the ED the patient was given 2 L NS bolus, vancomycin p.o. 125 mg x 1.  Patient was discussed with GI in the ED who agrees to see patient in a.m.  Patient has been admitted to the ED observation unit for GI consultation as well as ongoing hydration.    Review of Systems    All other ROS negative except those mentioned in above note.      Past Medical History    I have reviewed this patient's medical history and updated it with pertinent information if needed.   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      Anxiety      Basal cell carcinoma      Cervical high risk HPV (human papillomavirus) test positive 2019    See problem list     Colitis      Depression      Diabetes mellitus, iatrogenic (H) 2020     Inflammatory arthritis      Insomnia      Intestinal giardiasis 3/5/2018     Lumbago     left lower back pain     Lymphedema      Malignant melanoma (H)      Melanoma (H) 10/23/2017     Mild persistent asthma      Obesity      STORMY (obstructive sleep apnea)      Prothrombin deficiency (H)     takes 81mg asa daily     Stroke (cerebrum) (H)     During      TIA (transient ischemic attack)      Type 2 diabetes mellitus (H)        Past Surgical History   I have reviewed this patient's surgical history and updated it with pertinent information if needed.  Past Surgical History:   Procedure Laterality Date     APPENDECTOMY       COLONOSCOPY N/A 10/18/2017     Procedure: COLONOSCOPY;  Colon;  Surgeon: Debbie Stephens MD;  [facility]     COLONOSCOPY N/A 3/9/2018    Procedure: COMBINED COLONOSCOPY, SINGLE OR MULTIPLE BIOPSY/POLYPECTOMY BY BIOPSY;  colon;  Surgeon: Benita Schumacher MD;  Location: [facility] GI     COLONOSCOPY      multiple since  to present - about 6 total     DISSECT LYMPH NODE AXILLA Left 10/23/2017    Procedure: DISSECT LYMPH NODE AXILLA;  Left Axillary Lymph Node Dissection ;  Surgeon: Laurent Cool MD;  [facility]     EXAM UNDER ANESTHESIA PELVIC N/A 2020    Procedure: EXAM UNDER ANESTHESIA, PELVIS; with Cervical Biopsies, Vaginal Biopsy and Endocervical Curettings;  Surgeon: Melina Jung MD;  Location: [facility] OR     GYN SURGERY  ,          REPAIR MOHS Left 2017    Procedure: REPAIR MOHS;  Left Upper Lid Moh's Reconstruction;  Surgeon: Kisha Bosch MD;  [facility]       Social History   I have reviewed this patient's social history and updated it with pertinent information if needed.  Social History     Tobacco Use     Smoking status: Former Smoker     Packs/day: 1.00     Years: 5.00     Pack years: 5.00     Quit date: 3/20/1998     Years since quittin.0     Smokeless tobacco: Never Used   Substance Use Topics     Alcohol use: Yes     Comment: occ     Drug use: No       Family History   I have reviewed this patient's family history and updated it with pertinent information if needed.  Family History   Problem Relation Age of Onset     Cancer Mother 45        lung     Neurologic Disorder Mother         epilepsy     Lipids Father      Gastrointestinal Disease Father         diverticulitis      Depression Father      Cancer Maternal Grandmother      Blood Disease Maternal Grandmother         lymphoma      Arthritis Maternal Grandmother      Diabetes Maternal Grandmother      Depression Maternal Grandmother      Macular Degeneration Maternal Grandmother      Glaucoma Maternal Grandmother      Diabetes  Maternal Grandfather      Cerebrovascular Disease Maternal Grandfather      Blood Disease Maternal Grandfather      Heart Disease Maternal Grandfather      Glaucoma Maternal Grandfather      Cancer Paternal Grandmother      Cancer - colorectal Paternal Grandmother      Respiratory Paternal Grandfather         emphysema      Heart Disease Daughter      Asthma Daughter      Depression Sister      Melanoma No family hx of        Prior to Admission Medications   Prior to Admission Medications   Prescriptions Last Dose Informant Patient Reported? Taking?   ACCU-CHEK GUIDE test strip   No No   Sig: USE TO TEST BLOOD SUGAR TWO TIMES A DAY OR AS DIRECTED   Calcium Carb-Cholecalciferol 600-800 MG-UNIT TABS 4/13/2021  Yes Yes   Sig: Take 800 mg by mouth daily before breakfast   VITAMIN D3 25 MCG (1000 UT) tablet 4/13/2021  No Yes   Sig: TAKE THREE TABLETS BY MOUTH ONCE DAILY   acetaminophen (TYLENOL) 500 MG tablet 4/13/2021 at AM  Yes Yes   Sig: Take 1,000 mg by mouth every 8 hours as needed for mild pain   blood glucose monitoring (ACCU-CHEK FASTCLIX) lancets   No No   Sig: USE TWO TIMES A DAY OR AS DIRECTED   ferrous fumarate 65 mg, [place]. FE,-Vitamin C 125 mg (VITRON-C)  MG TABS tablet 4/13/2021  No Yes   Sig: Take 1 tablet by mouth daily   gabapentin (NEURONTIN) 300 MG capsule 4/13/2021  No Yes   Sig: TAKE THREE CAPSULES BY MOUTH FOUR TIMES A DAY   hydrOXYzine (ATARAX) 25 MG tablet Past Week  No Yes   Sig: TAKE ONE TABLET BY MOUTH AT BEDTIME   Patient taking differently: As needed   metFORMIN (GLUCOPHAGE-XR) 500 MG 24 hr tablet 4/13/2021  Yes Yes   Sig: Take 1,000 mg by mouth Daily with breakfast.   ondansetron (ZOFRAN) 8 MG tablet More than a month Self Yes No   Sig: Take 8 mg by mouth every 8 hours as needed for nausea (Pt has not taken in past year 2/10/20)    predniSONE (DELTASONE) 5 MG tablet 4/13/2021  Yes Yes   Sig: Take 5 mg by mouth daily   rosuvastatin (CRESTOR) 10 MG tablet 4/13/2021  No Yes   Sig: Take 1  tablet (10 mg) by mouth daily   tocilizumab (ACTEMRA) 162 MG/0.9ML subcutaneous injection 4/11/2021  Yes Yes   Sig: Inject 162 mg Subcutaneous every 14 days   vancomycin (VANCOCIN) 125 MG capsule 4/13/2021  Yes Yes   Sig: Take 125 mg by mouth 4 times daily   venlafaxine (EFFEXOR-ER) 225 MG 24 hr tablet 4/13/2021  No Yes   Sig: TAKE ONE TABLET BY MOUTH ONCE DAILY      Facility-Administered Medications: None     Allergies   Allergies   Allergen Reactions     Bee Venom Swelling     Azithromycin Diarrhea     Erythromycin      Other reaction(s): GI intolerance, Vomiting     Fentanyl Other (See Comments)     sweating  sweating     Prochlorperazine Fatigue     Other reaction(s): Other (see comments)  Fatigue     Buspirone      Other reaction(s): GI intolerance  vomiting     Erythrocin Nausea and Vomiting     Zithromax [Azithromycin Dihydrate] Diarrhea     Enbrel [Etanercept] Hives and Rash       Physical Exam   Vital Signs: Temp: 98.2  F (36.8  C) Temp src: Oral BP: 132/70 Pulse: 77   Resp: 17 SpO2: 98 % O2 Device: None (Room air)    Weight: 255 lbs 0 oz    Constitutional: awake, alert, cooperative, no apparent distress, and appears stated age. Appears anxious  Eyes: Lids and lashes normal, pupils equal, round and reactive to light, extra ocular muscles intact, sclera clear, conjunctiva normal  ENT: Normocephalic, without obvious abnormality, atraumatic, sinuses nontender on palpation, external ears without lesions, oral pharynx with moist mucous membranes, tonsils without erythema or exudates, gums normal and good dentition.  Hematologic / Lymphatic: no cervical lymphadenopathy  Respiratory: No increased work of breathing, good air exchange, clear to auscultation bilaterally, no crackles or wheezing  Cardiovascular: Normal apical impulse, regular rate and rhythm, normal S1 and S2, no S3 or S4, and no murmur noted  GI: No scars, normal bowel sounds, soft, non-distended, non-tender, no masses palpated, no  hepatosplenomegally  Skin: no bruising or bleeding  Musculoskeletal: There is no redness, warmth, or swelling of the joints.  Full range of motion noted.  Motor strength is 5 out of 5 all extremities bilaterally.  Tone is normal.  Neurologic: Awake, alert, oriented to name, place and time.  Cranial nerves II-XII are grossly intact.  Motor is 5 out of 5 bilaterally.  Cerebellar finger to nose, heel to shin intact.  Sensory is intact.  Babinski down going, Romberg negative, and gait is normal.  Neuropsychiatric: General: normal, anxious and normal eye contact. Teary sometimes with conversations      Data   Data reviewed today: I reviewed all medications, new labs and imaging results over the last 24 hours. I personally reviewed  Recent Labs   Lab 04/14/21  0003 04/13/21  1403   WBC 10.9 10.2   HGB 12.5 13.1   MCV 96 97    281   INR  --  0.91    138   POTASSIUM 3.6 4.3   CHLORIDE 106 107   CO2 24 25   BUN 12 15   CR 0.74 0.71   ANIONGAP 8 6   PATRICIA 8.5 9.8   * 99   ALBUMIN 3.6 3.8   PROTTOTAL 6.3* 7.0   BILITOTAL 0.2 0.1*   ALKPHOS 47 50   ALT 36 42   AST 16 20   LIPASE  --  53*   TROPI <0.015 <0.015     Most Recent 3 CBC's:  Recent Labs   Lab Test 04/14/21  0003 04/13/21  1403 09/23/20  1517   WBC 10.9 10.2 11.0   HGB 12.5 13.1 13.4   MCV 96 97 93    281 223     Most Recent 3 BMP's:  Recent Labs   Lab Test 04/14/21  0003 04/13/21  1403 09/06/20  1955    138 139   POTASSIUM 3.6 4.3 4.0   CHLORIDE 106 107 108   CO2 24 25 26   BUN 12 15 15   CR 0.74 0.71 0.72   ANIONGAP 8 6 5   PATRICIA 8.5 9.8 9.4   * 99 102*     Most Recent 2 LFT's:  Recent Labs   Lab Test 04/14/21  0003 04/13/21  1403   AST 16 20   ALT 36 42   ALKPHOS 47 50   BILITOTAL 0.2 0.1*     Most Recent 3 INR's:  Recent Labs   Lab Test 04/13/21  1403 02/10/20  1233 03/09/18  0715   INR 0.91 0.98 0.99     Most Recent 3 Creatinines:  Recent Labs   Lab Test 04/14/21  0003 04/13/21  1403 09/06/20  1955   CR 0.74 0.71 0.72     Most  Recent 3 Hemoglobins:  Recent Labs   Lab Test 04/14/21  0003 04/13/21  1403 09/23/20  1517   HGB 12.5 13.1 13.4     Most Recent 3 Troponin's:  Recent Labs   Lab Test 04/14/21  0003 04/13/21  1403 11/15/19  1641   TROPI <0.015 <0.015 <0.015     Most Recent Cholesterol Panel:  Recent Labs   Lab Test 04/05/21  1000   CHOL 207*   *   HDL 40*   TRIG 244*     Most Recent Hemoglobin A1c:  Recent Labs   Lab Test 04/05/21  1000   A1C 6.1*     Most Recent 6 glucoses:  Recent Labs   Lab Test 04/14/21  0003 04/13/21  1403 09/06/20  1955 06/09/20 06/04/20  1514 01/27/20  1158   * 99 102* 83 80 156*     Most Recent ESR & CRP:  Recent Labs   Lab Test 04/14/21  0003 04/13/21  1403   SED  --  4   CRP <2.9 3.0     Most Recent Anemia Panel:  Recent Labs   Lab Test 04/14/21 0003 01/30/20  1407 01/30/20  1407 01/27/20  1158   WBC 10.9   < >  --  14.9*   HGB 12.5   < >  --  14.0   HCT 37.6   < >  --  42.9   MCV 96   < >  --  89      < >  --  333   RETICABSCT  --   --   --  83.2   RETP  --   --   --  1.7   KRISTI  --   --  36  --     < > = values in this interval not displayed.     Recent Results (from the past 24 hour(s))   XR Chest Port 1 View    Narrative    Exam: XR CHEST PORT 1 VW, 4/13/2021 1:45 PM    Indication: sob and light headedness with rectal bleeding    Comparison: 11/15/2019    Findings:   Heart and pulmonary vasculature within normal limits. Pleural spaces  are clear. Lungs are clear.      Impression    Impression: No cardiopulmonary disease identified.    ASIA ARAGON MD   CT Abdomen Pelvis w Contrast    Narrative    EXAMINATION: CT ABDOMEN PELVIS W CONTRAST, 4/13/2021 4:59 PM    TECHNIQUE:  Helical CT images from the lung bases through the  symphysis pubis were obtained with IV contrast. Contrast dose:  iopamidol (ISOVUE-370) solution 135 mL     COMPARISON: c diff with ongoing rectal bleeding and diarrhea with  elevated lactic acid    HISTORY: c diff with ongoing rectal bleeding and diarrhea  with  elevated lactic acid.    FINDINGS::     Redundant sigmoid colon. A few scattered colonic diverticuli. No  adjacent inflammatory change. Moderate amount of semisolid stool  throughout the colon.    Liver: Normal parenchymal attenuation without focal mass.  Biliary system: Gallbladder is within normal limits. No intrahepatic  or extrahepatic biliary ductal dilatation.  Pancreas: Significantly atrophic and fatty replaced. No ductal  dilation or mass.  Stomach: Within normal limits.  Spleen: Within normal limits.  Adrenal glands: Within normal limits.  Kidneys: No focal mass, hydronephrosis, or stone.  Bladder: Within normal limits.  Reproductive organs: Innumerable tiny follicles on the ovaries. Uterus  is normal.  Appendix: Appendectomy stump appears normal.  Small Bowel: Small air-filled duodenal diverticulum extending off the  superior aspect of the third portion the duodenum.  Lymph nodes: No intra-abdominal or pelvic lymphadenopathy.  Vasculature: Within normal limits.    Lung bases: Mild atelectatic changes in the lungs. Cardiac size is  normal without pericardial effusion..    Bones and soft tissues: No suspicious soft tissue mass or fluid  collection. No suspicious osseous lesion. Small fat filled umbilical  hernia.      Impression    IMPRESSION:   1. No evidence for active colitis or complications of C. difficile  colitis. Moderate amount of semisolid stool throughout the colon.   2. No other acute findings in abdomen or pelvis.  3. Incidental findings as above.    I have personally reviewed the examination and initial interpretation  and I agree with the findings.    JOVI VEGA MD

## 2021-04-14 NOTE — PHARMACY-ADMISSION MEDICATION HISTORY
Admission Medication History Completed by Pharmacy    See [facility] Admission Navigator for allergy information, preferred outpatient pharmacy, prior to admission medications and immunization status.     Medication History Sources:     Patient, Dispense Report    Changes made to PTA medication list (reason):    Added:     Actemra (per dispense report, confirmed with patient)    Deleted:     Goodsense arthritis pain 1300 mg TID prn (duplicate acetaminophen, per patient)    Changed:     Metformin - dinner >>> breakfast (per patient)    Prednisone - 10 mg >>> 5 mg (per patient)    Additional Information:    Patient is knowledgeable of medication use.     Patient gets Actemra doses every other Sunday, last dose was 4/11.    Patient is taking vancomycin 125 mg capsules QID for C diff, started on Thursday, 4/8, and has not missed a dose prior to admission.    Prior to Admission medications    Medication Sig Last Dose Taking? Auth Provider   acetaminophen (TYLENOL) 500 MG tablet Take 1,000 mg by mouth every 8 hours as needed for mild pain 4/13/2021 at AM Yes Unknown, Entered By History   Calcium Carb-Cholecalciferol 600-800 MG-UNIT TABS Take 800 mg by mouth daily before breakfast 4/13/2021 Yes Reported, Patient   ferrous fumarate 65 mg, [place]. FE,-Vitamin C 125 mg (VITRON-C)  MG TABS tablet Take 1 tablet by mouth daily 4/13/2021 Yes Sachin Machado PA   gabapentin (NEURONTIN) 300 MG capsule TAKE THREE CAPSULES BY MOUTH FOUR TIMES A DAY 4/13/2021 Yes Trang Lobo APRN CNP   hydrOXYzine (ATARAX) 25 MG tablet TAKE ONE TABLET BY MOUTH AT BEDTIME  Patient taking differently: As needed Past Week Yes Anna Naidu MD   metFORMIN (GLUCOPHAGE-XR) 500 MG 24 hr tablet Take 1,000 mg by mouth Daily with breakfast. 4/13/2021 Yes Unknown, Entered By History   predniSONE (DELTASONE) 5 MG tablet Take 5 mg by mouth daily 4/13/2021 Yes Unknown, Entered By History   rosuvastatin (CRESTOR) 10 MG tablet Take 1 tablet (10 mg) by  mouth daily 4/13/2021 Yes Anna Naidu MD   tocilizumab (ACTEMRA) 162 MG/0.9ML subcutaneous injection Inject 162 mg Subcutaneous every 14 days 4/11/2021 Yes Unknown, Entered By History   vancomycin (VANCOCIN) 125 MG capsule Take 125 mg by mouth 4 times daily 4/13/2021 Yes Reported, Patient   venlafaxine (EFFEXOR-ER) 225 MG 24 hr tablet TAKE ONE TABLET BY MOUTH ONCE DAILY 4/13/2021 Yes Anna Naidu MD   VITAMIN D3 25 MCG (1000 UT) tablet TAKE THREE TABLETS BY MOUTH ONCE DAILY 4/13/2021 Yes Anna Naidu MD   ACCU-CHEK GUIDE test strip USE TO TEST BLOOD SUGAR TWO TIMES A DAY OR AS DIRECTED   Anna Naidu MD   blood glucose monitoring (ACCU-CHEK FASTCLIX) lancets USE TWO TIMES A DAY OR AS DIRECTED   Anna Naidu MD   ondansetron (ZOFRAN) 8 MG tablet Take 8 mg by mouth every 8 hours as needed for nausea (Pt has not taken in past year 2/10/20)  More than a month  Reported, Patient       Date completed: 04/13/21    Medication history completed by: Tarik Conn

## 2021-04-14 NOTE — PLAN OF CARE
"/70 (BP Location: Right leg)   Pulse 77   Temp 98.2  F (36.8  C) (Oral)   Resp 17   Ht 1.588 m (5' 2.5\")   Wt 115.7 kg (255 lb)   SpO2 98%   BMI 45.90 kg/m      -diagnostic tests and consults completed and resulted - not met. ECHO due and GI consult this am.   -vital signs normal or at patient baseline - VSS  -tolerating oral intake to maintain hydration - in progress  -adequate pain control on oral analgesics - pt reports left anterior chest pain   -tolerating oral antibiotics or has plans for home infusion setup - not met   -infection is improving - in progress  -dyspnea improved and O2 sats greater than 88% on room air or prior home oxygen levels - pt denies SOB, O2 sat 98%  -returns to baseline functional status - not met   -GI consult completed - not met. Consult in AM   Nurse to notify provider when observation goals have been met and patient is ready for discharge.  "

## 2021-04-14 NOTE — PROVIDER NOTIFICATION
Paged ASHANTI Racquel regarding patient's new onset of anterior left chest pain that begun this evening that radiates down her left arm with numbness and tingling.     ASHANTI Racquel suggests putting the patient on tele and will come assess the patient.

## 2021-04-14 NOTE — PROGRESS NOTES
-diagnostic tests and consults completed and resulted - Not met   -vital signs normal or at patient baseline - Met  -tolerating oral intake to maintain hydration - Met  -adequate pain control on oral analgesics - Met  -tolerating oral antibiotics or has plans for home infusion setup - Met  -infection is improving - In progresss  -dyspnea improved and O2 sats greater than 88% on room air or prior home oxygen levels - Met  -returns to baseline functional status - Met   -GI consult completed - Not met

## 2021-04-15 VITALS
TEMPERATURE: 98.3 F | HEIGHT: 63 IN | HEART RATE: 75 BPM | RESPIRATION RATE: 18 BRPM | DIASTOLIC BLOOD PRESSURE: 104 MMHG | SYSTOLIC BLOOD PRESSURE: 175 MMHG | WEIGHT: 255 LBS | BODY MASS INDEX: 45.18 KG/M2 | OXYGEN SATURATION: 96 %

## 2021-04-15 LAB
ALBUMIN SERPL-MCNC: 3.3 G/DL (ref 3.4–5)
ALP SERPL-CCNC: 44 U/L (ref 40–150)
ALT SERPL W P-5'-P-CCNC: 34 U/L (ref 0–50)
ANION GAP SERPL CALCULATED.3IONS-SCNC: 5 MMOL/L (ref 3–14)
AST SERPL W P-5'-P-CCNC: 23 U/L (ref 0–45)
BASOPHILS # BLD AUTO: 0.1 10E9/L (ref 0–0.2)
BASOPHILS NFR BLD AUTO: 0.9 %
BILIRUB SERPL-MCNC: 0.2 MG/DL (ref 0.2–1.3)
BUN SERPL-MCNC: 12 MG/DL (ref 7–30)
CALCIUM SERPL-MCNC: 8.5 MG/DL (ref 8.5–10.1)
CHLORIDE SERPL-SCNC: 112 MMOL/L (ref 94–109)
CO2 SERPL-SCNC: 23 MMOL/L (ref 20–32)
CREAT SERPL-MCNC: 0.73 MG/DL (ref 0.52–1.04)
CRP SERPL-MCNC: <2.9 MG/L (ref 0–8)
DIFFERENTIAL METHOD BLD: NORMAL
EOSINOPHIL # BLD AUTO: 0.2 10E9/L (ref 0–0.7)
EOSINOPHIL NFR BLD AUTO: 2.9 %
ERYTHROCYTE [DISTWIDTH] IN BLOOD BY AUTOMATED COUNT: 12.5 % (ref 10–15)
GFR SERPL CREATININE-BSD FRML MDRD: >90 ML/MIN/{1.73_M2}
GLUCOSE BLDC GLUCOMTR-MCNC: 85 MG/DL (ref 70–99)
GLUCOSE SERPL-MCNC: 83 MG/DL (ref 70–99)
HCT VFR BLD AUTO: 40.3 % (ref 35–47)
HGB BLD-MCNC: 13 G/DL (ref 11.7–15.7)
HGB BLD-MCNC: 13 G/DL (ref 11.7–15.7)
IMM GRANULOCYTES # BLD: 0.1 10E9/L (ref 0–0.4)
IMM GRANULOCYTES NFR BLD: 0.9 %
LYMPHOCYTES # BLD AUTO: 2.7 10E9/L (ref 0.8–5.3)
LYMPHOCYTES NFR BLD AUTO: 34.1 %
MAGNESIUM SERPL-MCNC: 2.4 MG/DL (ref 1.6–2.3)
MCH RBC QN AUTO: 31.7 PG (ref 26.5–33)
MCHC RBC AUTO-ENTMCNC: 32.3 G/DL (ref 31.5–36.5)
MCV RBC AUTO: 98 FL (ref 78–100)
MONOCYTES # BLD AUTO: 0.7 10E9/L (ref 0–1.3)
MONOCYTES NFR BLD AUTO: 9 %
NEUTROPHILS # BLD AUTO: 4.1 10E9/L (ref 1.6–8.3)
NEUTROPHILS NFR BLD AUTO: 52.2 %
NRBC # BLD AUTO: 0 10*3/UL
NRBC BLD AUTO-RTO: 0 /100
PHOSPHATE SERPL-MCNC: 3.9 MG/DL (ref 2.5–4.5)
PLATELET # BLD AUTO: 265 10E9/L (ref 150–450)
POTASSIUM SERPL-SCNC: 3.9 MMOL/L (ref 3.4–5.3)
PROT SERPL-MCNC: 6.1 G/DL (ref 6.8–8.8)
RBC # BLD AUTO: 4.1 10E12/L (ref 3.8–5.2)
SODIUM SERPL-SCNC: 139 MMOL/L (ref 133–144)
WBC # BLD AUTO: 7.9 10E9/L (ref 4–11)

## 2021-04-15 PROCEDURE — 96376 TX/PRO/DX INJ SAME DRUG ADON: CPT

## 2021-04-15 PROCEDURE — C9113 INJ PANTOPRAZOLE SODIUM, VIA: HCPCS | Performed by: NURSE PRACTITIONER

## 2021-04-15 PROCEDURE — 83735 ASSAY OF MAGNESIUM: CPT | Performed by: NURSE PRACTITIONER

## 2021-04-15 PROCEDURE — 36415 COLL VENOUS BLD VENIPUNCTURE: CPT | Performed by: NURSE PRACTITIONER

## 2021-04-15 PROCEDURE — 250N000009 HC RX 250: Performed by: NURSE PRACTITIONER

## 2021-04-15 PROCEDURE — 99233 SBSQ HOSP IP/OBS HIGH 50: CPT | Performed by: NURSE PRACTITIONER

## 2021-04-15 PROCEDURE — 250N000011 HC RX IP 250 OP 636: Performed by: NURSE PRACTITIONER

## 2021-04-15 PROCEDURE — 85025 COMPLETE CBC W/AUTO DIFF WBC: CPT | Performed by: NURSE PRACTITIONER

## 2021-04-15 PROCEDURE — 85018 HEMOGLOBIN: CPT | Performed by: NURSE PRACTITIONER

## 2021-04-15 PROCEDURE — 80053 COMPREHEN METABOLIC PANEL: CPT | Performed by: NURSE PRACTITIONER

## 2021-04-15 PROCEDURE — 250N000012 HC RX MED GY IP 250 OP 636 PS 637: Performed by: PHYSICIAN ASSISTANT

## 2021-04-15 PROCEDURE — G0378 HOSPITAL OBSERVATION PER HR: HCPCS

## 2021-04-15 PROCEDURE — 999N001017 HC STATISTIC GLUCOSE BY METER IP

## 2021-04-15 PROCEDURE — 999N000007 HC SITE CHECK

## 2021-04-15 PROCEDURE — 250N000013 HC RX MED GY IP 250 OP 250 PS 637: Performed by: EMERGENCY MEDICINE

## 2021-04-15 PROCEDURE — 250N000013 HC RX MED GY IP 250 OP 250 PS 637: Performed by: PHYSICIAN ASSISTANT

## 2021-04-15 PROCEDURE — 84100 ASSAY OF PHOSPHORUS: CPT | Performed by: NURSE PRACTITIONER

## 2021-04-15 PROCEDURE — 86140 C-REACTIVE PROTEIN: CPT | Performed by: NURSE PRACTITIONER

## 2021-04-15 PROCEDURE — 99217 PR OBSERVATION CARE DISCHARGE: CPT | Performed by: EMERGENCY MEDICINE

## 2021-04-15 PROCEDURE — 258N000003 HC RX IP 258 OP 636: Performed by: PHYSICIAN ASSISTANT

## 2021-04-15 RX ORDER — LOPERAMIDE HCL 2 MG
2 CAPSULE ORAL 4 TIMES DAILY PRN
Status: DISCONTINUED | OUTPATIENT
Start: 2021-04-15 | End: 2021-04-15 | Stop reason: HOSPADM

## 2021-04-15 RX ORDER — VANCOMYCIN HYDROCHLORIDE 125 MG/1
CAPSULE ORAL
Qty: 40 CAPSULE | Refills: 0 | Status: SHIPPED | OUTPATIENT
Start: 2021-04-15 | End: 2021-05-16

## 2021-04-15 RX ORDER — POLYETHYLENE GLYCOL 3350 17 G/17G
17 POWDER, FOR SOLUTION ORAL ONCE
Status: COMPLETED | OUTPATIENT
Start: 2021-04-15 | End: 2021-04-15

## 2021-04-15 RX ADMIN — SODIUM CHLORIDE: 9 INJECTION, SOLUTION INTRAVENOUS at 03:56

## 2021-04-15 RX ADMIN — VANCOMYCIN HYDROCHLORIDE 125 MG: 125 CAPSULE ORAL at 08:57

## 2021-04-15 RX ADMIN — GABAPENTIN 900 MG: 300 CAPSULE ORAL at 11:42

## 2021-04-15 RX ADMIN — GABAPENTIN 900 MG: 300 CAPSULE ORAL at 08:57

## 2021-04-15 RX ADMIN — PREDNISONE 5 MG: 5 TABLET ORAL at 08:57

## 2021-04-15 RX ADMIN — POLYETHYLENE GLYCOL 3350 17 G: 17 POWDER, FOR SOLUTION ORAL at 01:38

## 2021-04-15 RX ADMIN — VENLAFAXINE HYDROCHLORIDE 225 MG: 75 CAPSULE, EXTENDED RELEASE ORAL at 09:03

## 2021-04-15 RX ADMIN — ROSUVASTATIN CALCIUM 10 MG: 10 TABLET, FILM COATED ORAL at 08:56

## 2021-04-15 RX ADMIN — Medication 250 MG: at 09:03

## 2021-04-15 RX ADMIN — Medication: at 13:26

## 2021-04-15 RX ADMIN — PANTOPRAZOLE SODIUM 40 MG: 40 INJECTION, POWDER, FOR SOLUTION INTRAVENOUS at 08:56

## 2021-04-15 RX ADMIN — VANCOMYCIN HYDROCHLORIDE 125 MG: 125 CAPSULE ORAL at 11:42

## 2021-04-15 NOTE — PLAN OF CARE
"-vital signs normal or at patient baseline: /74 (BP Location: Right leg)   Pulse 80   Temp 98.6  F (37  C) (Oral)   Resp 17   Ht 1.588 m (5' 2.5\")   Wt 115.7 kg (255 lb)   SpO2 95%   BMI 45.90 kg/m      -tolerating oral intake to maintain hydration: in progress    -adequate pain control on oral analgesics: in progress    -tolerating oral antibiotics or has plans for home infusion setup: in progress    -infection is improving: in progress    -dyspnea improved and O2 sats greater than 88% on room air or prior home oxygen levels: N/A    -returns to baseline functional status: in progress    -GI consult completed: in progress  "

## 2021-04-15 NOTE — PROGRESS NOTES
Patient reported feeling of constipation and unable to pass any feces however every time she has the urge to have a BM she notes only pure bloody output though output seems to be slightly decreased.  On review of CT abdomen pelvis on admission yesterday, there is report of moderate amount of semisolid stool throughout the colon.    Therefore Imodium was discontinued.  Patient actually did not receive any doses of this.  A dose of MiraLAX 17 g p.o. x1 given.    Plan:-Discussed with GI in a.m.  -If patient has no significant result from 1 dose of MiraLAX we will plan on placing patient on 3 times daily dose until resolved.    Daljit Wallace PA-C

## 2021-04-15 NOTE — PROGRESS NOTES
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     # Watery diarrhea with intermittent bloody diarrhea  # First recurrence of C.difficile colitis, nonsevere form     --first episode 8 years ago, unknown rx regimen  # H/o checkpoint inhibitor induced colitis s/p Remicade  # RA on tocilizumab & Prednisone  #Tenesmus  VSS. Hgb stable despite intermittent scant rectal blood with urgency. No bm in over 24 hours otherwise. Given this is her second episode of c diff, first recurrence a vancomycin taper is warranted. Given her scant BRBPR, tenesmus, and history of checkpoint inhibitor colitis, she should continue to follow with GI and also undergo endoscopy.     Recommendations  -Vanco taper: 125 mg QID 10 days, BID 7 days, daily 7 days, then every other day for 14 days  -Reschedule flex with [facility] as planned  -Continue to follow with [facility]  -With significant overt GI bleeding, dyspnea, dizziness, or lightheaded would seek emergent care    GI will signoff. Thank you for involving us in this patient's care. Please do not hesitate to contact the GI service with any questions or concerns.     Pt care plan discussed with Dr. Servin, GI staff physician.      Yamil Diaz CNP  [facility]  Text page          Interval History:   No bm since yesterday but continuing to put out periodic scant blood rectally with associated tenesmus. Hgb stable. Good po without nausea, vomiting or abdominal pain.         Medications:     Current Facility-Administered Medications   Medication     acetaminophen (TYLENOL)  tablet 1,000 mg     benzocaine (ORAJEL MAXIMUM STRENGTH) 20 % gel     glucose gel 15-30 g    Or     dextrose 50 % injection 25-50 mL    Or     glucagon injection 1 mg     gabapentin (NEURONTIN) capsule 900 mg     hydrOXYzine (ATARAX) tablet 25 mg     insulin aspart (NovoLOG) injection (RAPID ACTING)     lidocaine (LMX4) cream     lidocaine 1 % 0.1-1 mL     loperamide (IMODIUM) capsule 2 mg     melatonin tablet 1 mg     [Held by provider] metFORMIN (GLUCOPHAGE-XR) 24 hr tablet 1,000 mg     ondansetron (ZOFRAN-ODT) ODT tab 4 mg    Or     ondansetron (ZOFRAN) injection 4 mg     pantoprazole (PROTONIX) IV push injection 40 mg     predniSONE (DELTASONE) tablet 5 mg     rosuvastatin (CRESTOR) tablet 10 mg     saccharomyces boulardii (FLORASTOR) capsule 250 mg     sodium chloride (PF) 0.9% PF flush 3 mL     sodium chloride (PF) 0.9% PF flush 3 mL     sodium chloride (PF) 0.9% PF flush 3 mL     sodium chloride 0.9% infusion     vancomycin (VANCOCIN) capsule 125 mg     venlafaxine (EFFEXOR-XR) 24 hr capsule 225 mg     Current Outpatient Medications   Medication Sig     acetaminophen (TYLENOL) 500 MG tablet Take 1,000 mg by mouth every 8 hours as needed for mild pain     Calcium Carb-Cholecalciferol 600-800 MG-UNIT TABS Take 800 mg by mouth daily before breakfast     ferrous fumarate 65 mg, [place]. FE,-Vitamin C 125 mg (VITRON-C)  MG TABS tablet Take 1 tablet by mouth daily     gabapentin (NEURONTIN) 300 MG capsule TAKE THREE CAPSULES BY MOUTH FOUR TIMES A DAY     hydrOXYzine (ATARAX) 25 MG tablet TAKE ONE TABLET BY MOUTH AT BEDTIME (Patient taking differently: As needed)     metFORMIN (GLUCOPHAGE-XR) 500 MG 24 hr tablet Take 1,000 mg by mouth Daily with breakfast.     predniSONE (DELTASONE) 5 MG tablet Take 5 mg by mouth daily     rosuvastatin (CRESTOR) 10 MG tablet Take 1 tablet (10 mg) by mouth daily     tocilizumab (ACTEMRA) 162 MG/0.9ML subcutaneous injection Inject 162 mg Subcutaneous every 14 days     vancomycin  "(VANCOCIN) 125 MG capsule Take 1 capsule (125 mg) by mouth 4 times daily for 3 days, THEN 1 capsule (125 mg) 2 times daily for 7 days, THEN 1 capsule (125 mg) daily for 7 days, THEN 1 capsule (125 mg) every other day for 14 days.     venlafaxine (EFFEXOR-ER) 225 MG 24 hr tablet TAKE ONE TABLET BY MOUTH ONCE DAILY     VITAMIN D3 25 MCG (1000 UT) tablet TAKE THREE TABLETS BY MOUTH ONCE DAILY     ACCU-CHEK GUIDE test strip USE TO TEST BLOOD SUGAR TWO TIMES A DAY OR AS DIRECTED     blood glucose monitoring (ACCU-CHEK FASTCLIX) lancets USE TWO TIMES A DAY OR AS DIRECTED     ondansetron (ZOFRAN) 8 MG tablet Take 8 mg by mouth every 8 hours as needed for nausea (Pt has not taken in past year 2/10/20)      Facility-Administered Medications Ordered in Other Encounters   Medication     lidocaine 1 % 9 mL     sodium bicarbonate 8.4 % injection 1 mEq        Physical Exam   Blood pressure (!) 175/104, pulse 75, temperature 98.3  F (36.8  C), temperature source Oral, resp. rate 18, height 1.588 m (5' 2.5\"), weight 115.7 kg (255 lb), SpO2 96 %, not currently breastfeeding.  Constitutional: cooperative, pleasant, not dyspneic/diaphoretic, no acute distress  Eyes: Sclera anicteric/injected  Ears/nose/mouth/throat: Normal oropharynx without ulcers or exudate, mucus membranes moist  Neck: supple  CV: No edema  Respiratory: Unlabored breathing  Abd: Nondistended, +bs, no hepatosplenomegaly, nontender, no peritoneal signs  Skin: warm, perfused, no jaundice  Neuro: AAO x 3  Psych: Normal affect  MSK: No gross deformities  Rectal: external hemorrhoid without fissure. No mass, brown stool in rectal vault. Chaperone present     Data   Current Labs  CBC  Recent Labs   Lab 04/15/21  1237 04/15/21  0719 04/14/21  0601 04/14/21  0003 04/13/21  1403   WBC  --  7.9 7.7 10.9 10.2   RBC  --  4.10 3.86 3.91 4.17   HGB 13.0 13.0 11.9 12.5 13.1   HCT  --  40.3 36.9 37.6 40.4   MCV  --  98 96 96 97   MCH  --  31.7 30.8 32.0 31.4   MCHC  --  32.3 32.2 " 33.2 32.4   RDW  --  12.5 12.5 12.5 12.4   PLT  --  265 262 268 281     BMP  Recent Labs   Lab 04/15/21  0719 04/14/21  0601 04/14/21  0003 04/13/21  1403    140 138 138   POTASSIUM 3.9 3.7 3.6 4.3   CHLORIDE 112* 108 106 107   CO2 23 25 24 25   ANIONGAP 5 7 8 6   GLC 83 93 100* 99   BUN 12 10 12 15   CR 0.73 0.76 0.74 0.71   GFRESTIMATED >90 >90 >90 >90   GFRESTBLACK >90 >90 >90 >90   PATRICIA 8.5 8.5 8.5 9.8   MAG 2.4*  --   --  2.1   PHOS 3.9 4.0  --   --       INR  Recent Labs   Lab 04/13/21  1403   INR 0.91     Liver panel  Recent Labs   Lab 04/15/21  0719 04/14/21  0601 04/14/21 0003 04/13/21  1403   PROTTOTAL 6.1* 5.9* 6.3* 7.0   ALBUMIN 3.3* 3.5 3.6 3.8   BILITOTAL 0.2 0.3 0.2 0.1*   ALKPHOS 44 45 47 50   AST 23 17 16 20   ALT 34 34 36 42       Imaging/procedures:  Reviewed in EMR

## 2021-04-15 NOTE — PLAN OF CARE
Observation Goals:     -diagnostic tests and consults completed and resulted - in progress  -vital signs normal or at patient baseline - VSS  -tolerating oral intake to maintain hydration - met   -adequate pain control on oral analgesics - met  -tolerating oral antibiotics or has plans for home infusion setup - met  -infection is improving - in progress  -dyspnea improved and O2 sats greater than 88% on room air or prior home oxygen levels - met   -returns to baseline functional status - not met   -GI consult completed - met

## 2021-04-15 NOTE — PLAN OF CARE
"-diagnostic tests and consults completed and resulted: in progress, GI awaiting for satnamo to clear up.    -vital signs normal or at patient baseline: /74 (BP Location: Right leg)   Pulse 80   Temp 98.6  F (37  C) (Oral)   Resp 17   Ht 1.588 m (5' 2.5\")   Wt 115.7 kg (255 lb)   SpO2 95%   BMI 45.90 kg/m      -tolerating oral intake to maintain hydration: in progress, patient on  ml/hr    -adequate pain control on oral analgesics: Patient c/o of pain in head 6/10. Tylenol given as prescribed. No decrease in pain. Atarax given as prescribed. No change in pain. Compazine given as prescribed. Patient asleep.    -tolerating oral antibiotics or has plans for home infusion setup: not met    -infection is improving: N/A    -dyspnea improved and O2 sats greater than 88% on room air or prior home oxygen levels: N/A    -returns to baseline functional status: in progress    -GI consult completed: in progress  "

## 2021-04-15 NOTE — DISCHARGE SUMMARY
Discharge Summary    Doretha Fernandez MRN# [id number]   YOB: 1975 Age: 46 year old     Date of Admission:  4/13/2021  Date of Discharge:  4/15/2021  Admitting Physician:  Kaia Pritchard MD  Discharge Physician:  Gordo Barnes MD   Discharging Service:  Emergency Medicine     Primary Provider: Anna Naidu          Discharge Diagnosis:     Dehydration    Hematochezia    Diarrhea of infectious origin    C. difficile colitis    * No resolved hospital problems. *               Discharge Disposition:   Discharged to home           Condition on Discharge:   Discharge condition: Stable   Code status on discharge: Full Code           Procedures:   No procedures performed during this admission          Discharge Medications:     Current Discharge Medication List      CONTINUE these medications which have CHANGED    Details   vancomycin (VANCOCIN) 125 MG capsule Take 1 capsule (125 mg) by mouth 4 times daily for 3 days, THEN 1 capsule (125 mg) 2 times daily for 7 days, THEN 1 capsule (125 mg) daily for 7 days, THEN 1 capsule (125 mg) every other day for 14 days.  Qty: 40 capsule, Refills: 0    Associated Diagnoses: C. difficile colitis         CONTINUE these medications which have NOT CHANGED    Details   acetaminophen (TYLENOL) 500 MG tablet Take 1,000 mg by mouth every 8 hours as needed for mild pain      Calcium Carb-Cholecalciferol 600-800 MG-UNIT TABS Take 800 mg by mouth daily before breakfast      ferrous fumarate 65 mg, [place]. FE,-Vitamin C 125 mg (VITRON-C)  MG TABS tablet Take 1 tablet by mouth daily  Qty: 30 tablet, Refills: 3    Associated Diagnoses: Persistent insomnia      gabapentin (NEURONTIN) 300 MG capsule TAKE THREE CAPSULES BY MOUTH FOUR TIMES A DAY  Qty: 360 capsule, Refills: 0    Associated Diagnoses: Other chronic pain      hydrOXYzine (ATARAX) 25 MG tablet TAKE ONE TABLET BY MOUTH AT BEDTIME  Qty: 90 tablet, Refills: 1    Associated Diagnoses: Persistent insomnia       metFORMIN (GLUCOPHAGE-XR) 500 MG 24 hr tablet Take 1,000 mg by mouth Daily with breakfast.      predniSONE (DELTASONE) 5 MG tablet Take 5 mg by mouth daily      rosuvastatin (CRESTOR) 10 MG tablet Take 1 tablet (10 mg) by mouth daily  Qty: 90 tablet, Refills: 3    Associated Diagnoses: Diabetes mellitus, iatrogenic (H)      tocilizumab (ACTEMRA) 162 MG/0.9ML subcutaneous injection Inject 162 mg Subcutaneous every 14 days      venlafaxine (EFFEXOR-ER) 225 MG 24 hr tablet TAKE ONE TABLET BY MOUTH ONCE DAILY  Qty: 90 tablet, Refills: 1    Associated Diagnoses: Mild major depression (H); Anxiety state      VITAMIN D3 25 MCG (1000 UT) tablet TAKE THREE TABLETS BY MOUTH ONCE DAILY  Qty: 300 tablet, Refills: 1    Associated Diagnoses: Vitamin D deficiency      ACCU-CHEK GUIDE test strip USE TO TEST BLOOD SUGAR TWO TIMES A DAY OR AS DIRECTED  Qty: 200 strip, Refills: 0    Associated Diagnoses: Diabetes mellitus, iatrogenic (H)      blood glucose monitoring (ACCU-CHEK FASTCLIX) lancets USE TWO TIMES A DAY OR AS DIRECTED  Qty: 102 each, Refills: 9    Associated Diagnoses: Diabetes mellitus, iatrogenic (H)      ondansetron (ZOFRAN) 8 MG tablet Take 8 mg by mouth every 8 hours as needed for nausea (Pt has not taken in past year 2/10/20)                    Consultations:   Consultation during this admission received from gastroenterology             Brief History of Illness:   Please see detailed H&P from 4/13/2021, in brief:    Doretha Fernandez is a 46 year old female admitted on 4/13/2021. She has a past medical history significant for metastatic malignant melanoma of the lymph nodes of the axillae and upper limb (10/2017), s/p left axillary lymphadenectomy, lymphedema of the left arm (treated with lymphedema machine), ulcerative colitis, rheumatoid arthritis (immunosuppressed on prednisone), type 2 iatrogenic diabetes mellitus, drug-induced Cushing's syndrome, strep throat (treated with penicillin 500 mg) c/b C. difficile  colitis who presents to the Emergency Department for evaluation of ongoing rectal bleeding and diarrhea.               Hospital Course:   ##C Difficile Colitis:  ##BRBPR:   Adopted from H & P. On 3/19/21 the patient saw her PCP who prescribed PCN x 10day d/t c/o stomach discomfort, sore throat, swollen glands, feeling like previous strep considering immunosuppressed status. D/c'ed after 7 days 2/2 improvement and diarrhea. Stool studies ordered by Dr. Schoenoff with GI at [facility] on 4/5/2021 which returned C. difficile positive. Started on PO vanco 4/7/2021 with worsening diarrhea. Reports multiple episodes of bloody diarrhea every hour and has had some stool incontinence. She reports headaches, lightheadedness and shortness of breath.  Denies fever, chills, night sweats, nausea, vomiting, abdominal pain, chest pain, shortness of breath. Patient is reportedly due for another flexible sigmoidoscopy on Friday (4/16/2021).  In ED, HR 50's-90's, BP 130s-220s/60s-100s, RR 16, SaO2 94-98% on RA, Temp 97.9. Labs show normal CMP with BUN 15, CR 0.71.  Normal lipase, BNP, CRP, CBC, INR.  Lactic acid 2.7.  Repeat lactic acid after 2 L of fluids is 2.1.  Blood cultures sent and pending.  Covid 19/RSV/flu PCR negative.  CT abdomen pelvis with contrast reports no evidence of active colitis or complications of C. difficile colitis; moderate amount of semisolid stool throughout the colon.  Chest x-ray negative. In the ED the patient was given 2 L NS bolus, vancomycin p.o. 125 mg x 1.  Patient was discussed with GI in the ED who agrees to see patient in a.m.  Patient has been admitted to the ED observation unit for GI consultation as well as ongoing hydration. Hgb trend 13.1--> 12.5--> 11.9.-->13-->13.  She reported continued bloody diarrhea today, but hgb stable, GI consulted and recommends a pulse taper of Vancomycin on discharge, ok to discharge to home from GI perspective.  GI recommends no imodium or miralax use.  She should  "reschedule her flexsig as well for in 2-3 weeks.  She was eating well.  Discharged to home in stable condition with understanding or return instructions.     ##. aphthous ulcers:    -anbesol prn       ##Chest Pain: Shortly after admission to the observation unit patient complains of left-sided chest pain, pressure radiation to left shoulder and arm.  Denies shortness of breath, lightheadedness, palpitations, diaphoresis, nausea, vomiting.  No previous history of CAD, DVT, PE.  Normal echocardiogram 2/10/2020.  Serial troponins q4h negative x 3. EKG is w/o ischemic changes. No ectopy on telemetry. Echo was normal.  - non cardiac chest pain     ##Seronegative RA  ##Adjuvant Immunotherapy Induced Severe Colitis  2/2 Nivolumab s/p 8 cycles completed Feb 22,2018. Responded to a dose of Remicade after poor response to high dose steroids and 5-ASA. Transitioned to [facility] as outpatient  - On Actemra  - Continue with PTA Prednisone     ##DM2, steroid induced: Last A1c was 6.1 on 4/5/21. On Metformin QD   - Hold Metformin x48 hours due to contrast study done (resume tomorrow)       ##Metastatic malignant melanoma:  Diagnosed on LN bx in Oct 2017. Primary site not identified. Followed by Dr. Richards. Has been on treatment with nivolumab since Nov every 2 weeks. Last dose was #7 on 2/15. Nivolumab currently on hold d/t persistent bloody stools. Dr. Richards aware of her admission.      ##Depression.  ##Anxiety.   -Continue PTA Venlafaxine.     ##Moderate STORMY:   - CPAP     ##RLS:   - Continue with PTA Gabapentin             Final Day of Progress before Discharge:       Physical Exam:  Blood pressure (!) 175/104, pulse 75, temperature 98.3  F (36.8  C), temperature source Oral, resp. rate 18, height 1.588 m (5' 2.5\"), weight 115.7 kg (255 lb), SpO2 96 %, not currently breastfeeding.    EXAM:  Exam:  Constitutional: healthy, alert and no distress  ENT: ENT exam normal, no neck nodes or sinus tenderness, 3 canker sores present in " "mouth.  Cardiovascular: No lifts, heaves, or thrills. RRR. No murmurs, clicks gallops or rub  Respiratory: Good diaphragmatic excursion. Lungs clear  Gastrointestinal: Abdomen soft, non-tender. BS normal. No masses, organomegaly  Musculoskeletal: extremities normal- no gross deformities noted, gait normal and normal muscle tone  Skin: no suspicious lesions or rashes  Neurologic: Gait normal. Alert and oriented.   Psychiatric: mentation appears normal and affect normal/bright    BP (!) 175/104 (BP Location: Right leg)   Pulse 75   Temp 98.3  F (36.8  C) (Oral)   Resp 18   Ht 1.588 m (5' 2.5\")   Wt 115.7 kg (255 lb)   SpO2 96%   BMI 45.90 kg/m               Data:  All laboratory data reviewed             Significant Results:     Results for orders placed or performed during the hospital encounter of 04/13/21   XR Chest Port 1 View     Status: None    Narrative    Exam: XR CHEST PORT 1 VW, 4/13/2021 1:45 PM    Indication: sob and light headedness with rectal bleeding    Comparison: 11/15/2019    Findings:   Heart and pulmonary vasculature within normal limits. Pleural spaces  are clear. Lungs are clear.      Impression    Impression: No cardiopulmonary disease identified.    ASIA ARAGON MD   CT Abdomen Pelvis w Contrast     Status: None    Narrative    EXAMINATION: CT ABDOMEN PELVIS W CONTRAST, 4/13/2021 4:59 PM    TECHNIQUE:  Helical CT images from the lung bases through the  symphysis pubis were obtained with IV contrast. Contrast dose:  iopamidol (ISOVUE-370) solution 135 mL     COMPARISON: c diff with ongoing rectal bleeding and diarrhea with  elevated lactic acid    HISTORY: c diff with ongoing rectal bleeding and diarrhea with  elevated lactic acid.    FINDINGS::     Redundant sigmoid colon. A few scattered colonic diverticuli. No  adjacent inflammatory change. Moderate amount of semisolid stool  throughout the colon.    Liver: Normal parenchymal attenuation without focal mass.  Biliary system: " Gallbladder is within normal limits. No intrahepatic  or extrahepatic biliary ductal dilatation.  Pancreas: Significantly atrophic and fatty replaced. No ductal  dilation or mass.  Stomach: Within normal limits.  Spleen: Within normal limits.  Adrenal glands: Within normal limits.  Kidneys: No focal mass, hydronephrosis, or stone.  Bladder: Within normal limits.  Reproductive organs: Innumerable tiny follicles on the ovaries. Uterus  is normal.  Appendix: Appendectomy stump appears normal.  Small Bowel: Small air-filled duodenal diverticulum extending off the  superior aspect of the third portion the duodenum.  Lymph nodes: No intra-abdominal or pelvic lymphadenopathy.  Vasculature: Within normal limits.    Lung bases: Mild atelectatic changes in the lungs. Cardiac size is  normal without pericardial effusion..    Bones and soft tissues: No suspicious soft tissue mass or fluid  collection. No suspicious osseous lesion. Small fat filled umbilical  hernia.      Impression    IMPRESSION:   1. No evidence for active colitis or complications of C. difficile  colitis. Moderate amount of semisolid stool throughout the colon.   2. No other acute findings in abdomen or pelvis.  3. Incidental findings as above.    I have personally reviewed the examination and initial interpretation  and I agree with the findings.    JOVI VEGA MD   CBC with platelets differential     Status: None   Result Value Ref Range    WBC 10.2 4.0 - 11.0 10e9/L    RBC Count 4.17 3.8 - 5.2 10e12/L    Hemoglobin 13.1 11.7 - 15.7 g/dL    Hematocrit 40.4 35.0 - 47.0 %    MCV 97 78 - 100 fl    MCH 31.4 26.5 - 33.0 pg    MCHC 32.4 31.5 - 36.5 g/dL    RDW 12.4 10.0 - 15.0 %    Platelet Count 281 150 - 450 10e9/L    Diff Method Automated Method     % Neutrophils 73.5 %    % Lymphocytes 16.8 %    % Monocytes 6.5 %    % Eosinophils 1.0 %    % Basophils 0.7 %    % Immature Granulocytes 1.5 %    Nucleated RBCs 0 0 /100    Absolute Neutrophil 7.5 1.6 - 8.3  10e9/L    Absolute Lymphocytes 1.7 0.8 - 5.3 10e9/L    Absolute Monocytes 0.7 0.0 - 1.3 10e9/L    Absolute Eosinophils 0.1 0.0 - 0.7 10e9/L    Absolute Basophils 0.1 0.0 - 0.2 10e9/L    Abs Immature Granulocytes 0.2 0 - 0.4 10e9/L    Absolute Nucleated RBC 0.0    INR     Status: None   Result Value Ref Range    INR 0.91 0.86 - 1.14   Partial thromboplastin time     Status: None   Result Value Ref Range    PTT 22 22 - 37 sec   CRP inflammation     Status: None   Result Value Ref Range    CRP Inflammation 3.0 0.0 - 8.0 mg/L   Erythrocyte sedimentation rate auto     Status: None   Result Value Ref Range    Sed Rate 4 0 - 20 mm/h   Comprehensive metabolic panel     Status: Abnormal   Result Value Ref Range    Sodium 138 133 - 144 mmol/L    Potassium 4.3 3.4 - 5.3 mmol/L    Chloride 107 94 - 109 mmol/L    Carbon Dioxide 25 20 - 32 mmol/L    Anion Gap 6 3 - 14 mmol/L    Glucose 99 70 - 99 mg/dL    Urea Nitrogen 15 7 - 30 mg/dL    Creatinine 0.71 0.52 - 1.04 mg/dL    GFR Estimate >90 >60 mL/min/[1.73_m2]    GFR Estimate If Black >90 >60 mL/min/[1.73_m2]    Calcium 9.8 8.5 - 10.1 mg/dL    Bilirubin Total 0.1 (L) 0.2 - 1.3 mg/dL    Albumin 3.8 3.4 - 5.0 g/dL    Protein Total 7.0 6.8 - 8.8 g/dL    Alkaline Phosphatase 50 40 - 150 U/L    ALT 42 0 - 50 U/L    AST 20 0 - 45 U/L   Magnesium     Status: None   Result Value Ref Range    Magnesium 2.1 1.6 - 2.3 mg/dL   Lipase     Status: Abnormal   Result Value Ref Range    Lipase 53 (L) 73 - 393 U/L   Lactic acid whole blood     Status: Abnormal   Result Value Ref Range    Lactic Acid 2.7 (H) 0.7 - 2.0 mmol/L   UA with Microscopic reflex to Culture     Status: Abnormal    Specimen: Urine Midstream; Midstream Urine   Result Value Ref Range    Color Urine Straw     Appearance Urine Clear     Glucose Urine Negative NEG^Negative mg/dL    Bilirubin Urine Negative NEG^Negative    Ketones Urine Negative NEG^Negative mg/dL    Specific Gravity Urine 1.007 1.003 - 1.035    Blood Urine  Negative NEG^Negative    pH Urine 7.0 5.0 - 7.0 pH    Protein Albumin Urine Negative NEG^Negative mg/dL    Urobilinogen mg/dL Normal 0.0 - 2.0 mg/dL    Nitrite Urine Negative NEG^Negative    Leukocyte Esterase Urine Negative NEG^Negative    Source Midstream Urine     WBC Urine <1 0 - 5 /HPF    RBC Urine 0 0 - 2 /HPF    Bacteria Urine Few (A) NEG^Negative /HPF    Squamous Epithelial /HPF Urine 1 0 - 1 /HPF   Troponin I     Status: None   Result Value Ref Range    Troponin I ES <0.015 0.000 - 0.045 ug/L   Nt probnp inpatient (BNP)     Status: None   Result Value Ref Range    N-Terminal Pro BNP Inpatient 13 0 - 450 pg/mL   Lactic acid     Status: Abnormal   Result Value Ref Range    Lactic Acid 2.1 (H) 0.7 - 2.0 mmol/L   Asymptomatic Influenza A/B & SARS-CoV2 (COVID-19) Virus PCR Multiplex     Status: None    Specimen: Nasopharyngeal   Result Value Ref Range    Flu A/B & SARS-COV-2 PCR Source Nasopharyngeal     SARS-CoV-2 PCR Result NEGATIVE     Influenza A PCR Negative NEG^Negative    Influenza B PCR Negative NEG^Negative    Respiratory Syncytial Virus PCR Negative NEG^Negative    Flu A/B & SARS-CoV-2 PCR Comment (Note)    Lactic acid whole blood     Status: None   Result Value Ref Range    Lactic Acid 1.5 0.7 - 2.0 mmol/L   Comprehensive metabolic panel     Status: Abnormal   Result Value Ref Range    Sodium 138 133 - 144 mmol/L    Potassium 3.6 3.4 - 5.3 mmol/L    Chloride 106 94 - 109 mmol/L    Carbon Dioxide 24 20 - 32 mmol/L    Anion Gap 8 3 - 14 mmol/L    Glucose 100 (H) 70 - 99 mg/dL    Urea Nitrogen 12 7 - 30 mg/dL    Creatinine 0.74 0.52 - 1.04 mg/dL    GFR Estimate >90 >60 mL/min/[1.73_m2]    GFR Estimate If Black >90 >60 mL/min/[1.73_m2]    Calcium 8.5 8.5 - 10.1 mg/dL    Bilirubin Total 0.2 0.2 - 1.3 mg/dL    Albumin 3.6 3.4 - 5.0 g/dL    Protein Total 6.3 (L) 6.8 - 8.8 g/dL    Alkaline Phosphatase 47 40 - 150 U/L    ALT 36 0 - 50 U/L    AST 16 0 - 45 U/L   CBC with platelets differential     Status: None    Result Value Ref Range    WBC 10.9 4.0 - 11.0 10e9/L    RBC Count 3.91 3.8 - 5.2 10e12/L    Hemoglobin 12.5 11.7 - 15.7 g/dL    Hematocrit 37.6 35.0 - 47.0 %    MCV 96 78 - 100 fl    MCH 32.0 26.5 - 33.0 pg    MCHC 33.2 31.5 - 36.5 g/dL    RDW 12.5 10.0 - 15.0 %    Platelet Count 268 150 - 450 10e9/L    Diff Method Automated Method     % Neutrophils 60.0 %    % Lymphocytes 29.3 %    % Monocytes 7.7 %    % Eosinophils 1.6 %    % Basophils 0.5 %    % Immature Granulocytes 0.9 %    Nucleated RBCs 0 0 /100    Absolute Neutrophil 6.6 1.6 - 8.3 10e9/L    Absolute Lymphocytes 3.2 0.8 - 5.3 10e9/L    Absolute Monocytes 0.8 0.0 - 1.3 10e9/L    Absolute Eosinophils 0.2 0.0 - 0.7 10e9/L    Absolute Basophils 0.1 0.0 - 0.2 10e9/L    Abs Immature Granulocytes 0.1 0 - 0.4 10e9/L    Absolute Nucleated RBC 0.0    CRP inflammation     Status: None   Result Value Ref Range    CRP Inflammation <2.9 0.0 - 8.0 mg/L   Troponin I     Status: None   Result Value Ref Range    Troponin I ES <0.015 0.000 - 0.045 ug/L   Troponin I     Status: None   Result Value Ref Range    Troponin I ES <0.015 0.000 - 0.045 ug/L   Comprehensive metabolic panel     Status: Abnormal   Result Value Ref Range    Sodium 140 133 - 144 mmol/L    Potassium 3.7 3.4 - 5.3 mmol/L    Chloride 108 94 - 109 mmol/L    Carbon Dioxide 25 20 - 32 mmol/L    Anion Gap 7 3 - 14 mmol/L    Glucose 93 70 - 99 mg/dL    Urea Nitrogen 10 7 - 30 mg/dL    Creatinine 0.76 0.52 - 1.04 mg/dL    GFR Estimate >90 >60 mL/min/[1.73_m2]    GFR Estimate If Black >90 >60 mL/min/[1.73_m2]    Calcium 8.5 8.5 - 10.1 mg/dL    Bilirubin Total 0.3 0.2 - 1.3 mg/dL    Albumin 3.5 3.4 - 5.0 g/dL    Protein Total 5.9 (L) 6.8 - 8.8 g/dL    Alkaline Phosphatase 45 40 - 150 U/L    ALT 34 0 - 50 U/L    AST 17 0 - 45 U/L   Phosphorus     Status: None   Result Value Ref Range    Phosphorus 4.0 2.5 - 4.5 mg/dL   Glucose by meter     Status: None   Result Value Ref Range    Glucose 96 70 - 99 mg/dL   CBC with  platelets differential     Status: None   Result Value Ref Range    WBC 7.7 4.0 - 11.0 10e9/L    RBC Count 3.86 3.8 - 5.2 10e12/L    Hemoglobin 11.9 11.7 - 15.7 g/dL    Hematocrit 36.9 35.0 - 47.0 %    MCV 96 78 - 100 fl    MCH 30.8 26.5 - 33.0 pg    MCHC 32.2 31.5 - 36.5 g/dL    RDW 12.5 10.0 - 15.0 %    Platelet Count 262 150 - 450 10e9/L    Diff Method Automated Method     % Neutrophils 51.8 %    % Lymphocytes 36.8 %    % Monocytes 7.3 %    % Eosinophils 2.6 %    % Basophils 0.8 %    % Immature Granulocytes 0.7 %    Nucleated RBCs 0 0 /100    Absolute Neutrophil 4.0 1.6 - 8.3 10e9/L    Absolute Lymphocytes 2.8 0.8 - 5.3 10e9/L    Absolute Monocytes 0.6 0.0 - 1.3 10e9/L    Absolute Eosinophils 0.2 0.0 - 0.7 10e9/L    Absolute Basophils 0.1 0.0 - 0.2 10e9/L    Abs Immature Granulocytes 0.1 0 - 0.4 10e9/L    Absolute Nucleated RBC 0.0    Glucose by meter     Status: Abnormal   Result Value Ref Range    Glucose 104 (H) 70 - 99 mg/dL   Glucose by meter     Status: Abnormal   Result Value Ref Range    Glucose 157 (H) 70 - 99 mg/dL   Glucose by meter     Status: None   Result Value Ref Range    Glucose 97 70 - 99 mg/dL   CBC with platelets differential     Status: None   Result Value Ref Range    WBC 7.9 4.0 - 11.0 10e9/L    RBC Count 4.10 3.8 - 5.2 10e12/L    Hemoglobin 13.0 11.7 - 15.7 g/dL    Hematocrit 40.3 35.0 - 47.0 %    MCV 98 78 - 100 fl    MCH 31.7 26.5 - 33.0 pg    MCHC 32.3 31.5 - 36.5 g/dL    RDW 12.5 10.0 - 15.0 %    Platelet Count 265 150 - 450 10e9/L    Diff Method Automated Method     % Neutrophils 52.2 %    % Lymphocytes 34.1 %    % Monocytes 9.0 %    % Eosinophils 2.9 %    % Basophils 0.9 %    % Immature Granulocytes 0.9 %    Nucleated RBCs 0 0 /100    Absolute Neutrophil 4.1 1.6 - 8.3 10e9/L    Absolute Lymphocytes 2.7 0.8 - 5.3 10e9/L    Absolute Monocytes 0.7 0.0 - 1.3 10e9/L    Absolute Eosinophils 0.2 0.0 - 0.7 10e9/L    Absolute Basophils 0.1 0.0 - 0.2 10e9/L    Abs Immature Granulocytes 0.1 0 -  0.4 10e9/L    Absolute Nucleated RBC 0.0    Comprehensive metabolic panel     Status: Abnormal   Result Value Ref Range    Sodium 139 133 - 144 mmol/L    Potassium 3.9 3.4 - 5.3 mmol/L    Chloride 112 (H) 94 - 109 mmol/L    Carbon Dioxide 23 20 - 32 mmol/L    Anion Gap 5 3 - 14 mmol/L    Glucose 83 70 - 99 mg/dL    Urea Nitrogen 12 7 - 30 mg/dL    Creatinine 0.73 0.52 - 1.04 mg/dL    GFR Estimate >90 >60 mL/min/[1.73_m2]    GFR Estimate If Black >90 >60 mL/min/[1.73_m2]    Calcium 8.5 8.5 - 10.1 mg/dL    Bilirubin Total 0.2 0.2 - 1.3 mg/dL    Albumin 3.3 (L) 3.4 - 5.0 g/dL    Protein Total 6.1 (L) 6.8 - 8.8 g/dL    Alkaline Phosphatase 44 40 - 150 U/L    ALT 34 0 - 50 U/L    AST 23 0 - 45 U/L   Magnesium     Status: Abnormal   Result Value Ref Range    Magnesium 2.4 (H) 1.6 - 2.3 mg/dL   Phosphorus     Status: None   Result Value Ref Range    Phosphorus 3.9 2.5 - 4.5 mg/dL   CRP inflammation     Status: None   Result Value Ref Range    CRP Inflammation <2.9 0.0 - 8.0 mg/L   Glucose by meter     Status: None   Result Value Ref Range    Glucose 85 70 - 99 mg/dL   Hemoglobin     Status: None   Result Value Ref Range    Hemoglobin 13.0 11.7 - 15.7 g/dL   EKG 12-lead, tracing only     Status: None   Result Value Ref Range    Interpretation ECG Click View Image link to view waveform and result    EKG 12-lead, complete     Status: None   Result Value Ref Range    Interpretation ECG Click View Image link to view waveform and result    GI LUMINAL ADULT IP CONSULT: Patient to be seen: Routine within 24 hrs; Call back #: [id number]; cdiff colitis with worsening diarrhea and BRBPR; Consultant may enter orders: Yes; Requesting provider? Attending physician; Name: Dr. Estrada     Status: None ()    Ezequiel Mccord MD     4/14/2021  2:12 PM  [facility] Gastroenterology Consultation    Doretha Rappn MRN# [id number]   Age: 46 year old YOB: 1975     Date of Admission:  4/13/2021    Reason for consult: BRBPR   C.difficile colitis with worsening diarrhea           Assessment and Plan:   47 yo F with significant PMH of h/o nivolumab induced colitis   treated with Remicade and Entyvio now on remission, h/o giardia   infection, h/o C.diff infection 8 years ago, RA on prednisone &   tocilizumab, recently strep throat treated with penicillin c/b   C.diff and started PO Vancomycin 4/7 without improvement, now   with ongoing watery diarrhea with intermittent bloody diarrhea &   blood clots.     # Watery diarrhea with intermittent bloody diarrhea  # First recurrence of C.difficile colitis, nonsevere form     --first episode 8 years ago, unknown rx regimen  # H/o checkpoint inhibitor induced colitis s/p Remicade  # RA on tocilizumab & Prednisone  Likely ongoing C.diff infection as she has obvious trigger event   with recent penicillin use and recent e/o positive C.diff   infection 4/6/21. Currently would be considered as nonsevere   form(benign exams, normal labs and CT) which can be managed with   antibiotic. Given her ongoing diarrhea, we suggest adding   loperamide for symptomatic treatment; loperamide was   traditionally avoided but e/o harm is equivocal, reserve use for   patient without ileus/colonic distension who find it difficult to   keep up with fluid loss. However, with no symptomatic   improvements after 7 days of PO Vancomycin, other causes of   colitis are also possible; other pathogens(eg. CMV?). Would   recommend giving PO Vancomycin more time and will re-discuss for   other potential causes if she fails conservative treatment.  - continue PO Vancomycin; recommend pulse-tapered regimen for 1st   recurrence  - continue fluid hydration  - advance diet as tolerates  - schedule loperamide for supportive treatment; can consider   TID/QID  - will consider further work ups if doesn't improve with   supportive treatment    Patient care plan discussed with Dr. Servin, GI staff  physician.    Ezequiel Pratt  PGY-1 Internal Medicine  GI luminal consult service  [id number]         Chief Complaint:   Bloody & watery diarrhea     History is obtained from the patient         History of Present Illness:   47 yo F with significant PMH of h/o nivolumab induced colitis,   h/o giardia infection, h/o C.diff infection 8 years ago, RA on   prednisone & tocilizumab, DMT2, Drug-induced cushing syndrome,   recently treated for strep throat with penicillin *10 d c/b   C.difficile colitis, now with ongoing watery diarrhea with   intermittent bloody diarrhea & blood clot despite PO vanc.    Known case nivolumab induced colitis 2018 which was successfully   treated with Remicade(infliximab, one dose in the hospital) and   complete induction dosing with Entyvio(vedolizumab), now on   remission. Previous medical therapies include: prednisone   (Minimal response to high dose oral and IV, significant weight   gain in Rodriguez feces), 5-ASA (No response, Lialda) and topical   therapies (Enema therapy, no response). Denies any changes in her   medications/new checkpoint inhibitor recently. He bowel movements   have been formed and more on a constipated side since then.     Was started on penicillin 3/19/21 and stopped after 7 days given   worsening diarrhea. She initially thought that it was from   previous colitis but it didn't improve so she went in and found   to have positive C.diff 4/5/21; other pathogens on enteric panel   were all negative. PO vanc QID was started 4/7/21; she reports   taking vancomycin regularly and as prescribed. However, she still   has ongoing watery diarrhea ~ 10 times/day to the point that her   anus feels irritated and now with incontinence. Intermittently   notices blood clots and sometimes with red watery diarrhea. Her   diarrhea is usually less during the night. Reports having   lightheadedness. Denies fever/chills. Denies chest pain,   abdominal pain, bloating, or N/V. Patient is  reportedly due for   another flexible sigmoidoscopy on Friday (2021).     CT abdomen pelvis with contrast  reports no evidence of   active colitis or complications of C. difficile colitis; moderate   amount of semisolid stool throughout the colon. GI consult placed   for management recommendations.         Past Medical History:   Metastatic melanoma previously treated with Nivolumab  H/o Nivolumab induced colitis  H/o C.diff infection 8 years ago; also d/t Abx, unknown rx   regimen  H/o Giardia infection  RA on prednisone & tocilizumab  DMT2  Drug-induced cushing syndrome          Past Surgical History:     Past Surgical History:   Procedure Laterality Date     APPENDECTOMY       COLONOSCOPY N/A 10/18/2017    Procedure: COLONOSCOPY;  Colon;  Surgeon: Debbie Stephens MD;    [facility]     COLONOSCOPY N/A 3/9/2018    Procedure: COMBINED COLONOSCOPY, SINGLE OR MULTIPLE   BIOPSY/POLYPECTOMY BY BIOPSY;  colon;  Surgeon: Benita Schumacher MD;  Location: [facility]     COLONOSCOPY      multiple since  to present - about 6 total     DISSECT LYMPH NODE AXILLA Left 10/23/2017    Procedure: DISSECT LYMPH NODE AXILLA;  Left Axillary Lymph Node   Dissection ;  Surgeon: Laurent Cool MD;  Location: [facility] OR     EXAM UNDER ANESTHESIA PELVIC N/A 2020    Procedure: EXAM UNDER ANESTHESIA, PELVIS; with Cervical   Biopsies, Vaginal Biopsy and Endocervical Curettings;  Surgeon:   Melina Jung MD;  Location: [facility] OR     GYN SURGERY  ,          REPAIR MOHS Left 2017    Procedure: REPAIR MOHS;  Left Upper Lid Moh's Reconstruction;    Surgeon: Kisha Bosch MD;  Location: [facility]             Social History:     Social History     Tobacco Use     Smoking status: Former Smoker     Packs/day: 1.00     Years: 5.00     Pack years: 5.00     Quit date: 3/20/1998     Years since quittin.0     Smokeless tobacco: Never Used   Substance Use Topics     Alcohol use: Yes     Comment: occ              Family History:     Family History   Problem Relation Age of Onset     Cancer Mother 45        lung     Neurologic Disorder Mother         epilepsy     Lipids Father      Gastrointestinal Disease Father         diverticulitis      Depression Father      Cancer Maternal Grandmother      Blood Disease Maternal Grandmother         lymphoma      Arthritis Maternal Grandmother      Diabetes Maternal Grandmother      Depression Maternal Grandmother      Macular Degeneration Maternal Grandmother      Glaucoma Maternal Grandmother      Diabetes Maternal Grandfather      Cerebrovascular Disease Maternal Grandfather      Blood Disease Maternal Grandfather      Heart Disease Maternal Grandfather      Glaucoma Maternal Grandfather      Cancer Paternal Grandmother      Cancer - colorectal Paternal Grandmother      Respiratory Paternal Grandfather         emphysema      Heart Disease Daughter      Asthma Daughter      Depression Sister      Melanoma No family hx of              Allergies:     Allergies   Allergen Reactions     Bee Venom Swelling     Azithromycin Diarrhea     Erythromycin      Other reaction(s): GI intolerance, Vomiting     Fentanyl Other (See Comments)     sweating  sweating     Prochlorperazine Fatigue     Other reaction(s): Other (see comments)  Fatigue     Buspirone      Other reaction(s): GI intolerance  vomiting     Erythrocin Nausea and Vomiting     Zithromax [Azithromycin Dihydrate] Diarrhea     Enbrel [Etanercept] Hives and Rash           Medications:     Medications Prior to Admission   Medication Sig Dispense Refill Last Dose     acetaminophen (TYLENOL) 500 MG tablet Take 1,000 mg by mouth   every 8 hours as needed for mild pain   4/13/2021 at AM     Calcium Carb-Cholecalciferol 600-800 MG-UNIT TABS Take 800 mg   by mouth daily before breakfast   4/13/2021     ferrous fumarate 65 mg, [place]. FE,-Vitamin C 125 mg (VITRON-C)    MG TABS tablet Take 1 tablet by mouth daily 30 tablet 3   4/13/2021      gabapentin (NEURONTIN) 300 MG capsule TAKE THREE CAPSULES BY   MOUTH FOUR TIMES A  capsule 0 4/13/2021     hydrOXYzine (ATARAX) 25 MG tablet TAKE ONE TABLET BY MOUTH AT   BEDTIME (Patient taking differently: As needed) 90 tablet 1 Past   Week     metFORMIN (GLUCOPHAGE-XR) 500 MG 24 hr tablet Take 1,000 mg by   mouth Daily with breakfast.   4/13/2021     predniSONE (DELTASONE) 5 MG tablet Take 5 mg by mouth daily     4/13/2021     rosuvastatin (CRESTOR) 10 MG tablet Take 1 tablet (10 mg) by   mouth daily 90 tablet 3 4/13/2021     tocilizumab (ACTEMRA) 162 MG/0.9ML subcutaneous injection   Inject 162 mg Subcutaneous every 14 days   4/11/2021     vancomycin (VANCOCIN) 125 MG capsule Take 125 mg by mouth 4   times daily   4/13/2021     venlafaxine (EFFEXOR-ER) 225 MG 24 hr tablet TAKE ONE TABLET BY   MOUTH ONCE DAILY 90 tablet 1 4/13/2021     VITAMIN D3 25 MCG (1000 UT) tablet TAKE THREE TABLETS BY MOUTH   ONCE DAILY 300 tablet 1 4/13/2021     ACCU-CHEK GUIDE test strip USE TO TEST BLOOD SUGAR TWO TIMES A   DAY OR AS DIRECTED 200 strip 0      blood glucose monitoring (ACCU-CHEK FASTCLIX) lancets USE TWO   TIMES A DAY OR AS DIRECTED 102 each 9      ondansetron (ZOFRAN) 8 MG tablet Take 8 mg by mouth every 8   hours as needed for nausea (Pt has not taken in past year   2/10/20)    More than a month             Review of Systems:   The Review of Systems is negative other than noted in the HPI          Physical Exam:   Vitals were reviewed  Temp: 98.2  F (36.8  C) Temp src: Oral BP: 129/72 Pulse: 83     Resp: 17 SpO2: 98 % O2 Device: BiPAP/CPAP    GA: NAD, look anxious  HEENT: eric dry lips  Lungs: clear on auscultation both lungs  CVS: RRR, full peripheral pulse  Abd: normal bowel sound, soft, mildly tender at RLQ and lower   umbilical area, no rebound or guarding  MIA: hemorrhoids seen, no active bleeding, normal sphincter tone  Neuro: A&O*3, otherwise grossly intact          Data:   IMAGINGS:  CT A&P 4/13/21   1.  No evidence for active colitis or complications of C.   difficile  colitis. Moderate amount of semisolid stool throughout the colon.     2. No other acute findings in abdomen or pelvis.  3. Incidental findings as above.    LABS  WBC 10.9 --> 7.7  Hgb 12.5 --> 11.9  Plt 268 --> 262    BUN 10  Cr 0.76    TP 5.9  Alb 3.5    ESR 4, CRP<2.9  Lactic acid 1.5    Enteric panel 4/6/21 positive for C.diff, otherwise negative.    PREVIOUS PROCEDURES  Flex sigmoidoscopy 05/2018 for diarrhea with abnormal CT abdomen  -Findings:       The perianal and digital rectal examinations were normal.       The visualized rectal and colonic mucosa was diffusely   abnormal. There        was loss of mucosal vascular pattern with shallow linear an   oval        erosions and mucopurulent exudate. The mucosa was edematous   causing mild        luminal narrowing. The scope was not advanced beyond 25 cm.   Biopsies        were taken with a cold forceps for viral culture and   histology. No        retroflexion was attempted.  -Surgical patholgy:   Final dianosis  A. Colon, Recto-sigmoid, endoscopic biopsy:  Fragments of  colonic mucosa with moderate activity, including erosions,  architectural distortion and increased chronic inflammation  within the lamina propria.  No dysplasia.  The differential  includes drug-associated injury (including Nivolumab  effect), inflammatory bowel disease, chronic infection and  diverticular disease-associated colitis. No CMV identified by   immunostatin.    Colonoscopy 09/2018--for follow up left sided medication induced   colitis  -Findings:       The perianal and digital rectal examinations were normal.       The terminal ileum appeared normal.       The cecum, transverse, and ascending colon appeared normal.   Biopsies        were taken with a cold forceps for histology.       The descending and sigmoid colon appeared normal. Biopsies   were taken        with a cold forceps for histology.       The rectal  mucosa was very mildly erythematous and   hypervascular with        areas of diminished vascularity as well. Biopsies were   obtained with a        cold forceps for histology.  -Surgical pathology:    Final diagnosis:       A. Colon, transverse, endoscopic biopsy:  Colonic mucosa   without diagnostic abnormality.       B. Colon, Descending, Sigmoid, endoscopic biopsy:  Colonic   mucosa with focal Paneth cell metaplasia, otherwise without   diagnostic abnormality.       C. Rectum, endoscopic biopsy:  Colonic mucosa with slight   crypt architectural disarray and focal Paneth cell metaplasia,   otherwise without diagnostic abnormality.      Flex sigmoidoscopy 02/2019 for LLQ abdominal pain evaluation  -Findings:       The perianal and digital rectal examinations were normal.       The colon (entire examined portion) appeared normal.   Biopsies were taken        with a cold forceps for histology.       The retroflexed view of the distal rectum and anal verge was   normal and        showed no anal or rectal abnormalities.  -Surgical pathology      Final diagnosis:  A. Colon, left, endoscopic biopsy:  Colonic mucosa without  diagnostic abnormality. No lymphocytic or collagenous  colitis.  B. Duodenum, 2nd part, endoscopic biopsy:  Small bowel  mucosa without diagnostic abnormality. Villi and plasma  cells are present. No evidence of Whipple's disease, celiac  sprue, or Giardia.  C. Stomach, antrum, body, and incisura, endoscopic biopsy:  Normal antral and fundic mucosa.  No Helicobacter pylori.  Separate normal duodenal mucosa.                  hCG qual urine POCT     Status: Normal   Result Value Ref Range    HCG Qual Urine Negative neg    Internal QC OK Yes    Echocardiogram Complete     Status: None    Narrative    [id number]  [id number]  [id number]  [id number]^JOSUÉ^SOUAD^LORIAll[facility],[place]  Echocardiography [facility]  [address] [place] [id number]     Name: ELIZABETH MOREIRA  T  MRN: [id number]  : 1975  Study Date: 2021 09:45 AM  Age: 46 yrs  Gender: Female  Patient Location: [facility]  Reason For Study: Chest Pain  Ordering Physician: NATIVIDAD MOSES  Performed By: ISAAK Mueller     BSA: 2.1 m2  Height: 63 in  Weight: 255 lb  HR: 77  BP: 129/72 mmHg  ______________________________________________________________________________  Procedure  Complete Portable Echo Adult. Contrast Optison. Technically difficult study.  Patient was given 5 ml mixture of 3 ml Optison and 6 ml saline. 4 ml wasted.  ______________________________________________________________________________  Interpretation Summary  Global and regional left ventricular function is normal with an EF of 55-60%.  Right ventricular function, chamber size, wall motion, and thickness are  normal.  The inferior vena cava is normal.  No pericardial effusion is present.  There has been no change.  ______________________________________________________________________________  Left Ventricle  Global and regional left ventricular function is normal with an EF of 55-60%.  Left ventricular wall thickness is normal. Left ventricular size is normal.  Left ventricular diastolic function is normal. No regional wall motion  abnormalities are seen.     Right Ventricle  Right ventricular function, chamber size, wall motion, and thickness are  normal.     Atria  Both atria appear normal. The atrial septum is intact as assessed by color  Doppler .     Mitral Valve  The mitral valve is normal.     Aortic Valve  The valve leaflets are not well visualized. On Doppler interrogation, there is  no significant stenosis or regurgitation.     Tricuspid Valve  The tricuspid valve is normal. Pulmonary artery systolic pressure cannot be  assessed.     Pulmonic Valve  The valve leaflets are not well visualized. On Doppler interrogation, there is  no significant stenosis or regurgitation.     Vessels  The thoracic aorta is normal. The  pulmonary artery and bifurcation cannot be  assessed. The inferior vena cava is normal.     Pericardium  No pericardial effusion is present.     Compared to Previous Study  There has been no change.  ______________________________________________________________________________  MMode/2D Measurements & Calculations     RVDd: 3.8 cm  IVSd: 0.97 cm  LVIDd: 5.2 cm  LVIDs: 3.5 cm  LVPWd: 1.0 cm  FS: 33.5 %  LV mass(C)d: 196.2 grams  LV mass(C)dI: 91.5 grams/m2  asc Aorta Diam: 2.7 cm  LVOT diam: 1.9 cm  LVOT area: 3.0 cm2     EF(MOD-bp): 59.2 %  LA Volume Index (BP): 25.9 ml/m2  RWT: 0.39  TAPSE: 2.2 cm     Doppler Measurements & Calculations  MV E max tiffanie: 92.8 cm/sec  MV A max tiffanie: 79.8 cm/sec  MV E/A: 1.2  MV dec slope: 567.5 cm/sec2  MV dec time: 0.16 sec  PA acc time: 0.10 sec  E/E' av.3  Lateral E/e': 7.1  Medial E/e': 9.4     ______________________________________________________________________________  Report approved by: Rula Shin 2021 11:49 AM         ABO/Rh type and screen     Status: None   Result Value Ref Range    ABO A     RH(D) Pos     Antibody Screen Neg     Test Valid Only At          [facility]    Specimen Expires 2021    Blood culture     Status: None (Preliminary result)    Specimen: Arm, Left; Blood    Left Arm   Result Value Ref Range    Specimen Description Blood Left Arm     Culture Micro No growth after 1 day       Recent Results (from the past 48 hour(s))   CT Abdomen Pelvis w Contrast    Narrative    EXAMINATION: CT ABDOMEN PELVIS W CONTRAST, 2021 4:59 PM    TECHNIQUE:  Helical CT images from the lung bases through the  symphysis pubis were obtained with IV contrast. Contrast dose:  iopamidol (ISOVUE-370) solution 135 mL     COMPARISON: c diff with ongoing rectal bleeding and diarrhea with  elevated lactic acid    HISTORY: c diff with ongoing rectal bleeding and diarrhea with  elevated lactic acid.    FINDINGS::      Redundant sigmoid colon. A few scattered colonic diverticuli. No  adjacent inflammatory change. Moderate amount of semisolid stool  throughout the colon.    Liver: Normal parenchymal attenuation without focal mass.  Biliary system: Gallbladder is within normal limits. No intrahepatic  or extrahepatic biliary ductal dilatation.  Pancreas: Significantly atrophic and fatty replaced. No ductal  dilation or mass.  Stomach: Within normal limits.  Spleen: Within normal limits.  Adrenal glands: Within normal limits.  Kidneys: No focal mass, hydronephrosis, or stone.  Bladder: Within normal limits.  Reproductive organs: Innumerable tiny follicles on the ovaries. Uterus  is normal.  Appendix: Appendectomy stump appears normal.  Small Bowel: Small air-filled duodenal diverticulum extending off the  superior aspect of the third portion the duodenum.  Lymph nodes: No intra-abdominal or pelvic lymphadenopathy.  Vasculature: Within normal limits.    Lung bases: Mild atelectatic changes in the lungs. Cardiac size is  normal without pericardial effusion..    Bones and soft tissues: No suspicious soft tissue mass or fluid  collection. No suspicious osseous lesion. Small fat filled umbilical  hernia.      Impression    IMPRESSION:   1. No evidence for active colitis or complications of C. difficile  colitis. Moderate amount of semisolid stool throughout the colon.   2. No other acute findings in abdomen or pelvis.  3. Incidental findings as above.    I have personally reviewed the examination and initial interpretation  and I agree with the findings.    JOVI VEGA MD   Echocardiogram Complete    Narrative    [id number]  [id number]  [id number]  [id number]^JOSUÉ^SOUAD^NATHALY     [facility],[place]  Echocardiography Laboratory  [address] [place] [id number]     Name: ELIZABETH MOREIRA  MRN: [id number]  : 1975  Study Date: 2021 09:45 AM  Age: 46 yrs  Gender: Female  Patient Location:  [facility]  Reason For Study: Chest Pain  Ordering Physician: NATIVIDAD MOSES  Performed By: ISAAK Mueller     BSA: 2.1 m2  Height: 63 in  Weight: 255 lb  HR: 77  BP: 129/72 mmHg  ______________________________________________________________________________  Procedure  Complete Portable Echo Adult. Contrast Optison. Technically difficult study.  Patient was given 5 ml mixture of 3 ml Optison and 6 ml saline. 4 ml wasted.  ______________________________________________________________________________  Interpretation Summary  Global and regional left ventricular function is normal with an EF of 55-60%.  Right ventricular function, chamber size, wall motion, and thickness are  normal.  The inferior vena cava is normal.  No pericardial effusion is present.  There has been no change.  ______________________________________________________________________________  Left Ventricle  Global and regional left ventricular function is normal with an EF of 55-60%.  Left ventricular wall thickness is normal. Left ventricular size is normal.  Left ventricular diastolic function is normal. No regional wall motion  abnormalities are seen.     Right Ventricle  Right ventricular function, chamber size, wall motion, and thickness are  normal.     Atria  Both atria appear normal. The atrial septum is intact as assessed by color  Doppler .     Mitral Valve  The mitral valve is normal.     Aortic Valve  The valve leaflets are not well visualized. On Doppler interrogation, there is  no significant stenosis or regurgitation.     Tricuspid Valve  The tricuspid valve is normal. Pulmonary artery systolic pressure cannot be  assessed.     Pulmonic Valve  The valve leaflets are not well visualized. On Doppler interrogation, there is  no significant stenosis or regurgitation.     Vessels  The thoracic aorta is normal. The pulmonary artery and bifurcation cannot be  assessed. The inferior vena cava is normal.     Pericardium  No pericardial  effusion is present.     Compared to Previous Study  There has been no change.  ______________________________________________________________________________  MMode/2D Measurements & Calculations     RVDd: 3.8 cm  IVSd: 0.97 cm  LVIDd: 5.2 cm  LVIDs: 3.5 cm  LVPWd: 1.0 cm  FS: 33.5 %  LV mass(C)d: 196.2 grams  LV mass(C)dI: 91.5 grams/m2  asc Aorta Diam: 2.7 cm  LVOT diam: 1.9 cm  LVOT area: 3.0 cm2     EF(MOD-bp): 59.2 %  LA Volume Index (BP): 25.9 ml/m2  RWT: 0.39  TAPSE: 2.2 cm     Doppler Measurements & Calculations  MV E max tiffanie: 92.8 cm/sec  MV A max tiffanie: 79.8 cm/sec  MV E/A: 1.2  MV dec slope: 567.5 cm/sec2  MV dec time: 0.16 sec  PA acc time: 0.10 sec  E/E' av.3  Lateral E/e': 7.1  Medial E/e': 9.4     ______________________________________________________________________________  Report approved by: Rula Shin 2021 11:49 AM                      Pending Results:   Unresulted Labs Ordered in the Past 30 Days of this Admission     Date and Time Order Name Status Description    2021 1323 Blood culture Preliminary                   Discharge Instructions and Follow-Up:     Discharge Procedure Orders   Reason for your hospital stay   Order Comments: You were admitted to the observation unit for monitoring of your hemoglobin and GI consult for ongoing c.diff infection and bloody diarrhea. GI recommended a taper of vancomycin (this was prescribed) and to not use imodium or miralax.  Reschedule your flex sig in 2-3 weeks.  Your hemoglobin remained stable.  You can restart your metformin tomorrow.     Adult [facility] Follow-up and recommended labs and tests   Order Comments: Follow up with primary care provider, Anna Naidu, within 7 days for hospital follow- up.  The following labs/tests are recommended: CBC.  Follow up for flex sig in 2-3 weeks    Appointments on [place] and/or [facility] (with [facility] or [facility] provider or service). Call [phone] if you haven't heard regarding these  appointments within 7 days of discharge.     Activity   Order Comments: Your activity upon discharge: activity as tolerated     Order Specific Question Answer Comments   Is discharge order? Yes      When to contact your care team   Order Comments: Return to the ER if you have worsening abdominal pain, increase in blood in stools, lightheadedness or fainting.     Diet   Order Comments: Follow this diet upon discharge: Orders Placed This Encounter      Advance Diet as Tolerated: Regular Diet Adult     Order Specific Question Answer Comments   Is discharge order? Yes           Attestation:  AMADA Jenkins CNP.

## 2021-04-15 NOTE — PLAN OF CARE
"/75 (BP Location: Right leg)   Pulse 74   Temp 97.9  F (36.6  C) (Oral)   Resp 18   Ht 1.588 m (5' 2.5\")   Wt 115.7 kg (255 lb)   SpO2 96%   BMI 45.90 kg/m      -diagnostic tests and consults completed and resulted - in progress  -vital signs normal or at patient baseline - VSS  -tolerating oral intake to maintain hydration - met   -adequate pain control on oral analgesics - pt denies pain  -tolerating oral antibiotics or has plans for home infusion setup - not met   -infection is improving - in progress  -dyspnea improved and O2 sats greater than 88% on room air or prior home oxygen levels - met   -returns to baseline functional status - not met   -GI consult completed - met     Nurse to notify provider when observation goals have been met and patient is ready for discharge  "

## 2021-04-15 NOTE — PROGRESS NOTES
Paged ASHANTI Racquel to clarify patient's diet status. In ASHANTI Kenny's note, she stated NPO at 0400 although the patient's current diet order is regular ADAT. ASHANTI Racquel confirmed that there is not a current definitive plan on any procedures nor any report on that reason, therefore the patient is able to be on a regular diet.

## 2021-04-15 NOTE — PLAN OF CARE
"/74 (BP Location: Right leg)   Pulse 80   Temp 98.6  F (37  C) (Oral)   Resp 17   Ht 1.588 m (5' 2.5\")   Wt 115.7 kg (255 lb)   SpO2 95%   BMI 45.90 kg/m      -diagnostic tests and consults completed and resulted - in progress  -vital signs normal or at patient baseline - VSS  -tolerating oral intake to maintain hydration - met   -adequate pain control on oral analgesics - pt denies pain  -tolerating oral antibiotics or has plans for home infusion setup - not met   -infection is improving - in progress  -dyspnea improved and O2 sats greater than 88% on room air or prior home oxygen levels - met   -returns to baseline functional status - not met   -GI consult completed - met   Nurse to notify provider when observation goals have been met and patient is ready for discharge  "

## 2021-04-15 NOTE — DISCHARGE SUMMARY
DISCHARGE   Discharged to: Home  Via: Automobile; driving self  Discharge Instructions: diet, activity, medications, follow up appointments, when to call the MD, and what to watchout for (i.e. s/s of infection, s/s of excess bleeding, increasing SOB, palpitations, chest pain, etc.   Prescriptions: To be filled by [facility] pharmacy per pt's request; medication list reviewed & sent with pt  Follow Up Appointments: arranged; information given  Belongings: All sent with pt  IV: out  Telemetry: off  Pt exhibits understanding of above discharge instructions; all questions answered.  Discharge Paperwork: faxed

## 2021-04-17 ENCOUNTER — AMBULATORY - HEALTHEAST (OUTPATIENT)
Dept: NURSING | Facility: CLINIC | Age: 46
End: 2021-04-17

## 2021-04-19 LAB
BACTERIA SPEC CULT: NO GROWTH
SPECIMEN SOURCE: NORMAL

## 2021-04-22 ENCOUNTER — TRANSFERRED RECORDS (OUTPATIENT)
Dept: HEALTH INFORMATION MANAGEMENT | Facility: CLINIC | Age: 46
End: 2021-04-22

## 2021-04-26 ENCOUNTER — HOSPITAL ENCOUNTER (EMERGENCY)
Facility: CLINIC | Age: 46
Discharge: LEFT BEFORE DISCHARGE | End: 2021-04-26
Payer: COMMERCIAL

## 2021-04-26 ENCOUNTER — HOSPITAL ENCOUNTER (INPATIENT)
Facility: CLINIC | Age: 46
LOS: 7 days | Discharge: HOME | DRG: 386 | End: 2021-05-03
Attending: EMERGENCY MEDICINE | Admitting: INTERNAL MEDICINE
Payer: COMMERCIAL

## 2021-04-26 ENCOUNTER — APPOINTMENT (OUTPATIENT)
Dept: CT IMAGING | Facility: CLINIC | Age: 46
DRG: 386 | End: 2021-04-26
Attending: EMERGENCY MEDICINE
Payer: COMMERCIAL

## 2021-04-26 DIAGNOSIS — K51.911 ULCERATIVE COLITIS WITH RECTAL BLEEDING, UNSPECIFIED LOCATION (H): ICD-10-CM

## 2021-04-26 DIAGNOSIS — R00.1 BRADYCARDIA: ICD-10-CM

## 2021-04-26 DIAGNOSIS — Z20.822 CONTACT WITH AND (SUSPECTED) EXPOSURE TO COVID-19: ICD-10-CM

## 2021-04-26 DIAGNOSIS — K21.9 GASTROESOPHAGEAL REFLUX DISEASE WITHOUT ESOPHAGITIS: Chronic | ICD-10-CM

## 2021-04-26 DIAGNOSIS — A04.72 COLITIS DUE TO CLOSTRIDIUM DIFFICILE: ICD-10-CM

## 2021-04-26 DIAGNOSIS — A04.72 C. DIFFICILE COLITIS: ICD-10-CM

## 2021-04-26 DIAGNOSIS — I10 HYPERTENSION, UNSPECIFIED TYPE: Primary | ICD-10-CM

## 2021-04-26 DIAGNOSIS — K51.919 ULCERATIVE COLITIS WITH COMPLICATION, UNSPECIFIED LOCATION (H): ICD-10-CM

## 2021-04-26 LAB
ABO + RH BLD: NORMAL
ABO + RH BLD: NORMAL
ALBUMIN SERPL-MCNC: 3.8 G/DL (ref 3.4–5)
ALP SERPL-CCNC: 55 U/L (ref 40–150)
ALT SERPL W P-5'-P-CCNC: 41 U/L (ref 0–50)
ANION GAP SERPL CALCULATED.3IONS-SCNC: 5 MMOL/L (ref 3–14)
AST SERPL W P-5'-P-CCNC: 15 U/L (ref 0–45)
BASOPHILS # BLD AUTO: 0.1 10E9/L (ref 0–0.2)
BASOPHILS NFR BLD AUTO: 0.5 %
BILIRUB SERPL-MCNC: 0.2 MG/DL (ref 0.2–1.3)
BLD GP AB SCN SERPL QL: NORMAL
BLOOD BANK CMNT PATIENT-IMP: NORMAL
BUN SERPL-MCNC: 11 MG/DL (ref 7–30)
C DIFF TOX B STL QL: NEGATIVE
CA-I BLD-SCNC: 4.9 MG/DL (ref 4.4–5.2)
CALCIUM SERPL-MCNC: 9.5 MG/DL (ref 8.5–10.1)
CHLORIDE SERPL-SCNC: 106 MMOL/L (ref 94–109)
CO2 BLDCOV-SCNC: 26 MMOL/L (ref 21–28)
CO2 SERPL-SCNC: 26 MMOL/L (ref 20–32)
CREAT SERPL-MCNC: 0.71 MG/DL (ref 0.52–1.04)
CRP SERPL-MCNC: 20 MG/L (ref 0–8)
DIFFERENTIAL METHOD BLD: ABNORMAL
EOSINOPHIL # BLD AUTO: 0 10E9/L (ref 0–0.7)
EOSINOPHIL NFR BLD AUTO: 0.2 %
ERYTHROCYTE [DISTWIDTH] IN BLOOD BY AUTOMATED COUNT: 12.9 % (ref 10–15)
ERYTHROCYTE [SEDIMENTATION RATE] IN BLOOD BY WESTERGREN METHOD: 11 MM/H (ref 0–20)
GFR SERPL CREATININE-BSD FRML MDRD: >90 ML/MIN/{1.73_M2}
GLUCOSE BLD-MCNC: 115 MG/DL (ref 70–99)
GLUCOSE SERPL-MCNC: 111 MG/DL (ref 70–99)
HCT VFR BLD AUTO: 37 % (ref 35–47)
HCT VFR BLD CALC: 37 %PCV (ref 35–47)
HGB BLD CALC-MCNC: 12.6 G/DL (ref 11.7–15.7)
HGB BLD-MCNC: 12.3 G/DL (ref 11.7–15.7)
IMM GRANULOCYTES # BLD: 0.2 10E9/L (ref 0–0.4)
IMM GRANULOCYTES NFR BLD: 1.4 %
LABORATORY COMMENT REPORT: NORMAL
LYMPHOCYTES # BLD AUTO: 2.6 10E9/L (ref 0.8–5.3)
LYMPHOCYTES NFR BLD AUTO: 17.5 %
MCH RBC QN AUTO: 30.8 PG (ref 26.5–33)
MCHC RBC AUTO-ENTMCNC: 33.2 G/DL (ref 31.5–36.5)
MCV RBC AUTO: 93 FL (ref 78–100)
MONOCYTES # BLD AUTO: 1 10E9/L (ref 0–1.3)
MONOCYTES NFR BLD AUTO: 6.6 %
NEUTROPHILS # BLD AUTO: 10.7 10E9/L (ref 1.6–8.3)
NEUTROPHILS NFR BLD AUTO: 73.8 %
NRBC # BLD AUTO: 0 10*3/UL
NRBC BLD AUTO-RTO: 0 /100
PCO2 BLDV: 41 MM HG (ref 40–50)
PH BLDV: 7.42 PH (ref 7.32–7.43)
PLATELET # BLD AUTO: 291 10E9/L (ref 150–450)
PO2 BLDV: 28 MM HG (ref 25–47)
POTASSIUM BLD-SCNC: 3.9 MMOL/L (ref 3.4–5.3)
POTASSIUM SERPL-SCNC: 3.9 MMOL/L (ref 3.4–5.3)
PROT SERPL-MCNC: 7.2 G/DL (ref 6.8–8.8)
RBC # BLD AUTO: 3.99 10E12/L (ref 3.8–5.2)
SAO2 % BLDV FROM PO2: 54 %
SARS-COV-2 RNA RESP QL NAA+PROBE: NEGATIVE
SODIUM BLD-SCNC: 140 MMOL/L (ref 133–144)
SODIUM SERPL-SCNC: 138 MMOL/L (ref 133–144)
SPECIMEN EXP DATE BLD: NORMAL
SPECIMEN SOURCE: NORMAL
SPECIMEN SOURCE: NORMAL
WBC # BLD AUTO: 14.5 10E9/L (ref 4–11)

## 2021-04-26 PROCEDURE — 999N001077 HC STATISTIC POTASSIUM ED POCT

## 2021-04-26 PROCEDURE — 85652 RBC SED RATE AUTOMATED: CPT | Performed by: EMERGENCY MEDICINE

## 2021-04-26 PROCEDURE — 999N001080 HC STATISTIC GLUCOSE ED POCT

## 2021-04-26 PROCEDURE — U0005 INFEC AGEN DETEC AMPLI PROBE: HCPCS | Performed by: EMERGENCY MEDICINE

## 2021-04-26 PROCEDURE — 99285 EMERGENCY DEPT VISIT HI MDM: CPT | Performed by: EMERGENCY MEDICINE

## 2021-04-26 PROCEDURE — 86850 RBC ANTIBODY SCREEN: CPT | Performed by: EMERGENCY MEDICINE

## 2021-04-26 PROCEDURE — 258N000003 HC RX IP 258 OP 636: Performed by: EMERGENCY MEDICINE

## 2021-04-26 PROCEDURE — 96376 TX/PRO/DX INJ SAME DRUG ADON: CPT | Performed by: EMERGENCY MEDICINE

## 2021-04-26 PROCEDURE — 86901 BLOOD TYPING SEROLOGIC RH(D): CPT | Performed by: EMERGENCY MEDICINE

## 2021-04-26 PROCEDURE — 82803 BLOOD GASES ANY COMBINATION: CPT

## 2021-04-26 PROCEDURE — 85025 COMPLETE CBC W/AUTO DIFF WBC: CPT | Performed by: EMERGENCY MEDICINE

## 2021-04-26 PROCEDURE — 96374 THER/PROPH/DIAG INJ IV PUSH: CPT | Mod: 59 | Performed by: EMERGENCY MEDICINE

## 2021-04-26 PROCEDURE — 96361 HYDRATE IV INFUSION ADD-ON: CPT | Performed by: EMERGENCY MEDICINE

## 2021-04-26 PROCEDURE — C9803 HOPD COVID-19 SPEC COLLECT: HCPCS | Performed by: EMERGENCY MEDICINE

## 2021-04-26 PROCEDURE — 74177 CT ABD & PELVIS W/CONTRAST: CPT

## 2021-04-26 PROCEDURE — 96375 TX/PRO/DX INJ NEW DRUG ADDON: CPT | Performed by: EMERGENCY MEDICINE

## 2021-04-26 PROCEDURE — 80053 COMPREHEN METABOLIC PANEL: CPT | Performed by: EMERGENCY MEDICINE

## 2021-04-26 PROCEDURE — 87506 IADNA-DNA/RNA PROBE TQ 6-11: CPT | Performed by: EMERGENCY MEDICINE

## 2021-04-26 PROCEDURE — 83993 ASSAY FOR CALPROTECTIN FECAL: CPT | Performed by: EMERGENCY MEDICINE

## 2021-04-26 PROCEDURE — 87209 SMEAR COMPLEX STAIN: CPT | Performed by: EMERGENCY MEDICINE

## 2021-04-26 PROCEDURE — 87177 OVA AND PARASITES SMEARS: CPT | Performed by: EMERGENCY MEDICINE

## 2021-04-26 PROCEDURE — 999N001079 HC STATISTIC HEMATOCRIT ED POCT

## 2021-04-26 PROCEDURE — 86140 C-REACTIVE PROTEIN: CPT | Performed by: EMERGENCY MEDICINE

## 2021-04-26 PROCEDURE — 999N001076 HC STATISTIC SODIUM ED POCT

## 2021-04-26 PROCEDURE — 250N000011 HC RX IP 250 OP 636: Performed by: EMERGENCY MEDICINE

## 2021-04-26 PROCEDURE — U0003 INFECTIOUS AGENT DETECTION BY NUCLEIC ACID (DNA OR RNA); SEVERE ACUTE RESPIRATORY SYNDROME CORONAVIRUS 2 (SARS-COV-2) (CORONAVIRUS DISEASE [COVID-19]), AMPLIFIED PROBE TECHNIQUE, MAKING USE OF HIGH THROUGHPUT TECHNOLOGIES AS DESCRIBED BY CMS-2020-01-R: HCPCS | Performed by: EMERGENCY MEDICINE

## 2021-04-26 PROCEDURE — 99222 1ST HOSP IP/OBS MODERATE 55: CPT | Mod: AI | Performed by: INTERNAL MEDICINE

## 2021-04-26 PROCEDURE — 99285 EMERGENCY DEPT VISIT HI MDM: CPT | Mod: 25 | Performed by: EMERGENCY MEDICINE

## 2021-04-26 PROCEDURE — 82330 ASSAY OF CALCIUM: CPT

## 2021-04-26 PROCEDURE — 120N000002 HC R&B MED SURG/OB UMMC

## 2021-04-26 PROCEDURE — 86900 BLOOD TYPING SEROLOGIC ABO: CPT | Performed by: EMERGENCY MEDICINE

## 2021-04-26 PROCEDURE — 74177 CT ABD & PELVIS W/CONTRAST: CPT | Mod: 26 | Performed by: RADIOLOGY

## 2021-04-26 PROCEDURE — 87493 C DIFF AMPLIFIED PROBE: CPT | Performed by: EMERGENCY MEDICINE

## 2021-04-26 RX ORDER — HYDROMORPHONE HYDROCHLORIDE 1 MG/ML
0.5 INJECTION, SOLUTION INTRAMUSCULAR; INTRAVENOUS; SUBCUTANEOUS ONCE
Status: COMPLETED | OUTPATIENT
Start: 2021-04-26 | End: 2021-04-26

## 2021-04-26 RX ORDER — METHYLPREDNISOLONE SODIUM SUCCINATE 125 MG/2ML
32 INJECTION, POWDER, LYOPHILIZED, FOR SOLUTION INTRAMUSCULAR; INTRAVENOUS ONCE
Status: COMPLETED | OUTPATIENT
Start: 2021-04-26 | End: 2021-04-26

## 2021-04-26 RX ORDER — IOPAMIDOL 755 MG/ML
135 INJECTION, SOLUTION INTRAVASCULAR ONCE
Status: COMPLETED | OUTPATIENT
Start: 2021-04-26 | End: 2021-04-26

## 2021-04-26 RX ORDER — METHYLPREDNISOLONE SODIUM SUCCINATE 40 MG/ML
32 INJECTION, POWDER, LYOPHILIZED, FOR SOLUTION INTRAMUSCULAR; INTRAVENOUS EVERY 12 HOURS
Status: DISCONTINUED | OUTPATIENT
Start: 2021-04-27 | End: 2021-05-02

## 2021-04-26 RX ADMIN — METHYLPREDNISOLONE SODIUM SUCCINATE 31.25 MG: 125 INJECTION, POWDER, FOR SOLUTION INTRAMUSCULAR; INTRAVENOUS at 21:52

## 2021-04-26 RX ADMIN — HYDROMORPHONE HYDROCHLORIDE 0.5 MG: 1 INJECTION, SOLUTION INTRAMUSCULAR; INTRAVENOUS; SUBCUTANEOUS at 21:20

## 2021-04-26 RX ADMIN — HYDROMORPHONE HYDROCHLORIDE 0.5 MG: 1 INJECTION, SOLUTION INTRAMUSCULAR; INTRAVENOUS; SUBCUTANEOUS at 19:09

## 2021-04-26 RX ADMIN — SODIUM CHLORIDE 1000 ML: 9 INJECTION, SOLUTION INTRAVENOUS at 19:09

## 2021-04-26 RX ADMIN — IOPAMIDOL 135 ML: 755 INJECTION, SOLUTION INTRAVENOUS at 21:00

## 2021-04-26 ASSESSMENT — ENCOUNTER SYMPTOMS
RHINORRHEA: 0
BACK PAIN: 1
DIARRHEA: 1
ABDOMINAL PAIN: 1
BLOOD IN STOOL: 1
DIZZINESS: 1
VOMITING: 0
COUGH: 0
HEMATURIA: 0
EYE REDNESS: 0
CHILLS: 0
NAUSEA: 0
FEVER: 1
SHORTNESS OF BREATH: 1
SORE THROAT: 0

## 2021-04-26 ASSESSMENT — MIFFLIN-ST. JEOR: SCORE: 1734.51

## 2021-04-26 NOTE — LETTER
[facility] [address] [place] [address]  Phone: [phone]    05/03/21    Doretha Fernandez  [address] [facility] [id number]      To whom it may concern:     This patient was admitted from 4/26-5/3/21. Given her symptoms, she will need to be out of work until symptom improvement or until she sees her doctor in 1 week.     Sincerely,      Avelino Peralta MD

## 2021-04-26 NOTE — LETTER
[facility] [address] [place] [address]  Phone: [phone]    05/03/21    Doretha Fernandez  [address] [facility] [id number]      To whom it may concern:     The above patient was hospitalized from 4/26/21-5/3/21. Please contact with questions    Sincerely,      Avelino Peralta MD

## 2021-04-26 NOTE — ED PROVIDER NOTES
[place] EMERGENCY DEPARTMENT ([facility])  4/26/21  History     Chief Complaint   Patient presents with     Abdominal Pain     Melena     The history is provided by the patient and medical records.     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 She had received infilixmab x 1 dose followed by vedolizumab with good results.  She has followed up with [facility] for all of her GI care. She has had bloody diarrhea for about a month, had a hospitalization here for C difficile in early April. Due to the persistence and severity of her symptoms, despite a long PO vanco taper, she had flexible sigmoidoscopy (4/22/2021 through [facility] with biopsies taken.  She received a call from her [facility] doc today saying that they were diagnostic for ulcerative colitis. She now comes to the ED c/o severe L sided abd pain and continued bloody diarrhea and passing pure blood per rectum. She reports 10-15 episodes of passing BRBPR, denies that she can even pass stool any longer.  Blood is bright red and dark red with clots. She has had tenesmus and severe L sided abd pain worsening since her flex sig 4 days ago. She has severe rectal pain when passing flatus which is new.    She endorses dizziness, fatigue and shortness of breath at times as well as intermittent  chills.  She denies nausea or vomiting.  No recent rhinorrhea, sore throat or cough.  No chest pain or episodes of syncope.  Patient received a message from her gastroenterologist earlier today in which her flex sig revealed ulcerative colitis.  Of note,  patient has oral sores which she states has been improving after taking her prescribed steroid.    Per chart review, patient was evaluated by her PCP on 3/19/2021 who prescribed 10-day course of penicillin for potential strep throat.  Patient completed 7 days of the course the developed diarrhea.  Stool studies were ordered at [facility] on 4/5/2021 which resulted positive for C. difficile.  Patient was started on a p.o. course of Vanco on 4/7/2021.  At that time patient reported experiencing multiple episodes of bloody diarrhea hourly along with headaches, lightheadedness and shortness of breath.  Patient was then evaluated in the ED on 4/13 due to rectal bleeding and diarrhea.  Patient's labs remarkable for lactic of 2.7 then 2.1 after receiving 2 L of fluids.  Creatinine 0.71, bun 15.  CT abdomen/pelvis showed no evidence of active colitis or complications of C. difficile colitis; moderate amount of semisolid stool throughout the colon.  CXR negative.  Patient was given a dose of vancomycin p.o. 125 mg.  She was admitted to ED observation unit for GI consult.  In the unit, bloody diarrhea continued but Hgb stable.  Patient was recommended to pulse taper of vancomycin without the use of Imodium or MiraLAX.    Per chart review from [facility] visit on 4/22/21, GI recommended budensonide and cortifoam enema as well as triamcinolone 0.1% oral gel for her painful oral ulcers. GI from [facility] told her that they would likely advise vedolizumab if she required biologic therapy.    Additionally per chart review, patient was diagnosed with metastatic malignant melanoma in 10/2017 and developed severe colitis secondary to nivolumab s/p 8 cycles, completed 2/22/2018.  Patient was placed on a dose of Remicade after poor response to high-dose steroids and 5-ASA.  She transition to [place] and is currently on Actemra and was to continue on  prednisone.  -----------------------------------------------------------------------------------    FLEXIBLE SIGMOIDOSCOPY - [facility] (2021)  Post-op Diagnoses:  - Preparation of the colon was fair.  - Diffuse moderate inflammation was found in the rectum and in the sigmoid colon secondary to colitis. Biopsied.  - The descending colon is normal.    Surg Path exam - [facility] 21  Biopsy - Colon    FINAL DIAGNOSIS  A. Colon, Sigmoid, Rectum, endoscopic biopsy:  Moderate  active chronic colitis. There is basal plasmacytosis and  crypt architectural distortion. No granulomas.  No  dysplasia.  ----------------------------------------------------------------------------------    I have reviewed the Medications, Allergies, Past Medical and Surgical History, and Social History in the [employer] system.  PAST MEDICAL HISTORY:   Past Medical History:   Diagnosis Date     Abnormal MRI     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      Anxiety      Basal cell carcinoma      Cervical high risk HPV (human papillomavirus) test positive 2019    See problem list     Colitis      Depression      Diabetes mellitus, iatrogenic (H) 2020     Inflammatory arthritis      Insomnia      Intestinal giardiasis 3/5/2018     Lumbago     left lower back pain     Lymphedema      Malignant melanoma (H)      Melanoma (H) 10/23/2017     Mild persistent asthma      Obesity      STORMY (obstructive sleep apnea)      Prothrombin deficiency (H)     takes 81mg asa daily     Stroke (cerebrum) (H)     During      TIA (transient ischemic attack)      Type 2 diabetes mellitus (H)        PAST SURGICAL HISTORY:   Past Surgical History:   Procedure Laterality Date     APPENDECTOMY       COLONOSCOPY N/A 10/18/2017    Procedure: COLONOSCOPY;  Colon;  Surgeon: [facility] MD;  Location: [facility] OR     COLONOSCOPY N/A 3/9/2018    Procedure: COMBINED COLONOSCOPY, SINGLE OR MULTIPLE BIOPSY/POLYPECTOMY BY BIOPSY;  colon;   Surgeon: Benita Schumacher MD;  Location:  GI     COLONOSCOPY      multiple since 2018 to present - about 6 total     DISSECT LYMPH NODE AXILLA Left 10/23/2017    Procedure: DISSECT LYMPH NODE AXILLA;  Left Axillary Lymph Node Dissection ;  Surgeon: Laurent Cool MD;  Location: [facility] OR     EXAM UNDER ANESTHESIA PELVIC N/A 2020    Procedure: EXAM UNDER ANESTHESIA, PELVIS; with Cervical Biopsies, Vaginal Biopsy and Endocervical Curettings;  Surgeon: Melina Jung MD;  Location: [facility] OR     GYN SURGERY  ,          REPAIR MOHS Left 2017    Procedure: REPAIR MOHS;  Left Upper Lid Moh's Reconstruction;  Surgeon: Kisha Bosch MD;  Location:  OR       Past medical history, past surgical history, medications, and allergies were reviewed with the patient. Additional pertinent items: None    FAMILY HISTORY:   Family History   Problem Relation Age of Onset     Cancer Mother 45        lung     Neurologic Disorder Mother         epilepsy     Lipids Father      Gastrointestinal Disease Father         diverticulitis      Depression Father      Cancer Maternal Grandmother      Blood Disease Maternal Grandmother         lymphoma      Arthritis Maternal Grandmother      Diabetes Maternal Grandmother      Depression Maternal Grandmother      Macular Degeneration Maternal Grandmother      Glaucoma Maternal Grandmother      Diabetes Maternal Grandfather      Cerebrovascular Disease Maternal Grandfather      Blood Disease Maternal Grandfather      Heart Disease Maternal Grandfather      Glaucoma Maternal Grandfather      Cancer Paternal Grandmother      Cancer - colorectal Paternal Grandmother      Respiratory Paternal Grandfather         emphysema      Heart Disease Daughter      Asthma Daughter      Depression Sister      Melanoma No family hx of        SOCIAL HISTORY:   Social History     Tobacco Use     Smoking status: Former Smoker     Packs/day: 1.00     Years: 5.00     Pack years: 5.00     Quit  date: 3/20/1998     Years since quittin.1     Smokeless tobacco: Never Used   Substance Use Topics     Alcohol use: Yes     Comment: occ     Social history was reviewed with the patient. Additional pertinent items: None      Patient's Medications   New Prescriptions    No medications on file   Previous Medications    ACCU-CHEK GUIDE TEST STRIP    USE TO TEST BLOOD SUGAR TWO TIMES A DAY OR AS DIRECTED    ACETAMINOPHEN (TYLENOL) 500 MG TABLET    Take 1,000 mg by mouth every 8 hours as needed for mild pain    BLOOD GLUCOSE MONITORING (ACCU-CHEK FASTCLIX) LANCETS    USE TWO TIMES A DAY OR AS DIRECTED    CALCIUM CARB-CHOLECALCIFEROL 600-800 MG-UNIT TABS    Take 800 mg by mouth daily before breakfast    FERROUS FUMARATE 65 MG, [place]. FE,-VITAMIN C 125 MG (VITRON-C)  MG TABS TABLET    Take 1 tablet by mouth daily    GABAPENTIN (NEURONTIN) 300 MG CAPSULE    TAKE THREE CAPSULES BY MOUTH FOUR TIMES A DAY    HYDROXYZINE (ATARAX) 25 MG TABLET    TAKE ONE TABLET BY MOUTH AT BEDTIME    METFORMIN (GLUCOPHAGE-XR) 500 MG 24 HR TABLET    Take 1,000 mg by mouth Daily with breakfast.    ONDANSETRON (ZOFRAN) 8 MG TABLET    Take 8 mg by mouth every 8 hours as needed for nausea (Pt has not taken in past year 2/10/20)     PREDNISONE (DELTASONE) 5 MG TABLET    Take 5 mg by mouth daily    ROSUVASTATIN (CRESTOR) 10 MG TABLET    Take 1 tablet (10 mg) by mouth daily    TOCILIZUMAB (ACTEMRA) 162 MG/0.9ML SUBCUTANEOUS INJECTION    Inject 162 mg Subcutaneous every 14 days    VANCOMYCIN (VANCOCIN) 125 MG CAPSULE    Take 1 capsule (125 mg) by mouth 4 times daily for 3 days, THEN 1 capsule (125 mg) 2 times daily for 7 days, THEN 1 capsule (125 mg) daily for 7 days, THEN 1 capsule (125 mg) every other day for 14 days.    VENLAFAXINE (EFFEXOR-ER) 225 MG 24 HR TABLET    TAKE ONE TABLET BY MOUTH ONCE DAILY    VITAMIN D3 25 MCG (1000 UT) TABLET    TAKE THREE TABLETS BY MOUTH ONCE DAILY   Modified Medications    No medications on file  "  Discontinued Medications    No medications on file          Allergies   Allergen Reactions     Bee Venom Swelling     Azithromycin Diarrhea     Erythromycin      Other reaction(s): GI intolerance, Vomiting     Fentanyl Other (See Comments)     sweating  sweating     Prochlorperazine Fatigue     Other reaction(s): Other (see comments)  Fatigue     Buspirone      Other reaction(s): GI intolerance  vomiting     Erythrocin Nausea and Vomiting     Zithromax [Azithromycin Dihydrate] Diarrhea     Enbrel [Etanercept] Hives and Rash        Review of Systems   Constitutional: Positive for fever. Negative for chills.   HENT: Negative for rhinorrhea and sore throat.         Oral ulcers   Eyes: Negative for redness.   Respiratory: Positive for shortness of breath. Negative for cough.    Cardiovascular: Negative for chest pain.   Gastrointestinal: Positive for abdominal pain, blood in stool and diarrhea. Negative for nausea and vomiting.   Genitourinary: Negative for hematuria.   Musculoskeletal: Positive for back pain.   Neurological: Positive for dizziness. Negative for syncope.   All other systems reviewed and are negative.    A complete review of systems was performed with pertinent positives and negatives noted in the HPI, and all other systems negative.    Physical Exam   BP: (!) 131/91  Pulse: 82  Temp: 98.8  F (37.1  C)  Resp: 18  Height: 160 cm (5' 3\")  Weight: 112 kg (247 lb)  SpO2: 100 %      Physical Exam  Vitals signs and nursing note reviewed.   Constitutional:       General: She is not in acute distress.     Appearance: She is well-developed. She is not diaphoretic.      Comments: Adult female, alert, cooperative, appears uncomfortable   HENT:      Head: Normocephalic and atraumatic.      Mouth/Throat:      Mouth: Mucous membranes are moist.      Comments: Healing oral ulcers  Eyes:      Conjunctiva/sclera: Conjunctivae normal.      Pupils: Pupils are equal, round, and reactive to light.   Cardiovascular:      " Rate and Rhythm: Normal rate and regular rhythm.      Heart sounds: Normal heart sounds.   Pulmonary:      Effort: Pulmonary effort is normal. No respiratory distress.      Breath sounds: Normal breath sounds. No wheezing or rales.   Abdominal:      General: There is no distension.      Palpations: Abdomen is soft.      Tenderness: There is abdominal tenderness.      Comments: Soft, obese, TTP along L side of abdomen with voluntary guarding, no rebound. Hypoactive bowel sounds   Skin:     General: Skin is warm and dry.   Neurological:      Mental Status: She is alert and oriented to person, place, and time.         ED Course   6:43 PM  The patient was seen and examined by Dr. Chanda Tran in Room ED 08.      Procedures                           Results for orders placed or performed during the hospital encounter of 04/26/21 (from the past 24 hour(s))   CBC with platelets differential   Result Value Ref Range    WBC 14.5 (H) 4.0 - 11.0 10e9/L    RBC Count 3.99 3.8 - 5.2 10e12/L    Hemoglobin 12.3 11.7 - 15.7 g/dL    Hematocrit 37.0 35.0 - 47.0 %    MCV 93 78 - 100 fl    MCH 30.8 26.5 - 33.0 pg    MCHC 33.2 31.5 - 36.5 g/dL    RDW 12.9 10.0 - 15.0 %    Platelet Count 291 150 - 450 10e9/L    Diff Method Automated Method     % Neutrophils 73.8 %    % Lymphocytes 17.5 %    % Monocytes 6.6 %    % Eosinophils 0.2 %    % Basophils 0.5 %    % Immature Granulocytes 1.4 %    Nucleated RBCs 0 0 /100    Absolute Neutrophil 10.7 (H) 1.6 - 8.3 10e9/L    Absolute Lymphocytes 2.6 0.8 - 5.3 10e9/L    Absolute Monocytes 1.0 0.0 - 1.3 10e9/L    Absolute Eosinophils 0.0 0.0 - 0.7 10e9/L    Absolute Basophils 0.1 0.0 - 0.2 10e9/L    Abs Immature Granulocytes 0.2 0 - 0.4 10e9/L    Absolute Nucleated RBC 0.0    Comprehensive metabolic panel   Result Value Ref Range    Sodium 138 133 - 144 mmol/L    Potassium 3.9 3.4 - 5.3 mmol/L    Chloride 106 94 - 109 mmol/L    Carbon Dioxide 26 20 - 32 mmol/L    Anion Gap 5 3 - 14 mmol/L    Glucose  111 (H) 70 - 99 mg/dL    Urea Nitrogen 11 7 - 30 mg/dL    Creatinine 0.71 0.52 - 1.04 mg/dL    GFR Estimate >90 >60 mL/min/[1.73_m2]    GFR Estimate If Black >90 >60 mL/min/[1.73_m2]    Calcium 9.5 8.5 - 10.1 mg/dL    Bilirubin Total 0.2 0.2 - 1.3 mg/dL    Albumin 3.8 3.4 - 5.0 g/dL    Protein Total 7.2 6.8 - 8.8 g/dL    Alkaline Phosphatase 55 40 - 150 U/L    ALT 41 0 - 50 U/L    AST 15 0 - 45 U/L   Erythrocyte sedimentation rate auto   Result Value Ref Range    Sed Rate 11 0 - 20 mm/h   CRP inflammation   Result Value Ref Range    CRP Inflammation 20.0 (H) 0.0 - 8.0 mg/L   ABO/Rh type and screen   Result Value Ref Range    ABO A     RH(D) Pos     Antibody Screen Neg     Test Valid Only At          [facility]    Specimen Expires 04/29/2021    ISTAT gases elec ica gluc christiano POCT   Result Value Ref Range    Ph Venous 7.42 7.32 - 7.43 pH    PCO2 Venous 41 40 - 50 mm Hg    PO2 Venous 28 25 - 47 mm Hg    Bicarbonate Venous 26 21 - 28 mmol/L    O2 Sat Venous 54 %    Sodium 140 133 - 144 mmol/L    Potassium 3.9 3.4 - 5.3 mmol/L    Glucose 115 (H) 70 - 99 mg/dL    Calcium Ionized 4.9 4.4 - 5.2 mg/dL    Hemoglobin 12.6 11.7 - 15.7 g/dL    Hematocrit - POCT 37 35.0 - 47.0 %PCV   Asymptomatic SARS-CoV-2 COVID-19 Virus (Coronavirus) by PCR    Specimen: Nasopharyngeal   Result Value Ref Range    SARS-CoV-2 Virus Specimen Source Nasopharyngeal     SARS-CoV-2 PCR Result NEGATIVE     SARS-CoV-2 PCR Comment       Testing was performed using the [employer] Xpress SARS-CoV-2 Assay on the Cepheid Gene-Xpert   Instrument Systems. Additional information about this Emergency Use Authorization (EUA)   assay can be found via the Lab Guide.     CT Abdomen Pelvis w Contrast    Narrative    EXAMINATION: CT ABDOMEN PELVIS W CONTRAST, 4/26/2021 9:07 PM    TECHNIQUE:  Helical CT images from the lung bases through the  symphysis pubis were obtained with contrast.  Coronal reformatted  images were generated  at a workstation for further assessment.    CONTRAST:  135 cc Isovue 370 IV.    COMPARISON: CT 4/13/2021    HISTORY: recent c diff infection, brand new diagnosis of ulcerative  colitis after flex sig done by [facility], severe L sided abd pain with  bloody diarrhea, eval for colitis/megacolon/intraabdominal abscess    FINDINGS:    Abdomen and pelvis:   Liver: No suspicious liver lesions. Portal veins appear patent.  Gallbladder: No gallstones. No evidence of acute cholecystitis.  Spleen: Normal size.  Pancreas: Near-complete fatty atrophy.  Adrenal glands: No adrenal nodules.  Kidneys: No kidney masses. No hydronephrosis or obstructing renal  stones.  Bladder / Pelvic organs: Prominent bilateral ovaries. Lateral aspect  of the right ovary is either heterogeneously enhancing or a  hemorrhagic cyst. Unchanged in size since prior studies.  Bowel: Colonic diverticulosis. The sigmoid colon is redundant. No  evidence for acute inflammatory changes. No bowel wall thickening. The  appendix is not identified, however there are no inflammatory changes  of the right lower quadrant. The small and large bowel are normal in  caliber.  Lymph nodes: No retroperitoneal, mesenteric, or pelvic  lymphadenopathy.  Fluid: No free fluid within the abdomen.  Vessels: No infrarenal aortic aneurysm.     Lung bases: Bibasilar atelectasis. The heart is not enlarged.    Bones and soft tissues: Mild degenerative changes of the spine. No  suspicious osseous lesions. Small fat-containing periumbilical hernia.      Impression    IMPRESSION:   1.  No evidence for active colitis.  2.  Colonic diverticulosis without evidence for diverticulitis.    I have personally reviewed the examination and initial interpretation  and I agree with the findings.    ASIA ARAGON MD   Clostridium difficile toxin B PCR    Specimen: Feces   Result Value Ref Range    Specimen Description Feces     C Diff Toxin B PCR Negative NEG^Negative     Medications   methylPREDNISolone  sodium succinate (solu-MEDROL) injection 32 mg (has no administration in time range)   0.9% sodium chloride BOLUS (0 mLs Intravenous Stopped 4/26/21 2113)   HYDROmorphone (PF) (DILAUDID) injection 0.5 mg (0.5 mg Intravenous Given 4/26/21 1909)   iopamidol (ISOVUE-370) solution 135 mL (135 mLs Intravenous Given 4/26/21 2100)   sodium chloride (PF) 0.9% PF flush 84 mL (84 mLs Intravenous Given 4/26/21 2100)   HYDROmorphone (PF) (DILAUDID) injection 0.5 mg (0.5 mg Intravenous Given 4/26/21 2120)   methylPREDNISolone sodium succinate (solu-MEDROL) injection 31.25 mg (31.25 mg Intravenous Given 4/26/21 2152)             Assessments & Plan (with Medical Decision Making)   Patient presents to the ED with c/o worsening abd pain, passing pure blood from rectum 10-15 x daily, in the context of continued treatment for c difficile with PO vanco and brand new diagnosis of ulcerative colitis (diagnosed by GI today at [facility] based on flex sig and biopsies from 4/22).  Reviewed notes from [facility].  Patient appears to be getting worse despite appropriate treatment for C. difficile.  We did establish IV access and we did draw blood for laboratory analysis.  CBC shows leukocytosis with white count of 14.5, hemoglobin today is 12.3 with a normal platelet count.  CMP is within normal limits, sed rate is normal, CRP is elevated at 20.  Type and screen has been obtained.  After discussion with GI I have ordered additional stool studies including repeat C. difficile, stool for O&P, and enteric pathogen panel.  Additionally I ordered a fecal calprotectin based on their recommendation.  Also after discussion with GI, I did order Solu-Medrol 32 mg here in the emergency department (which is the beginning dosage for the inflammatory bowel disease steroid taper).  Patient will be treated with IV fluids, analgesics, and steroids for now.  GI will see the patient in the morning.  Patient was admitted to the medicine service at this time.    I have  reviewed the nursing notes.    I have reviewed the findings, diagnosis, plan and need for follow up with the patient.    New Prescriptions    No medications on file       Final diagnoses:   Ulcerative colitis with complication, unspecified location (H)   C. difficile colitis     IHenry, am serving as a trained medical scribe to document services personally performed by Chanda Tran MD, based on the provider's statements to me.      IChanda MD, was physically present and have reviewed and verified the accuracy of this note documented by Henry Sandhu.     4/26/2021   [facility] EMERGENCY DEPARTMENT     Chanda Tran MD  04/27/21 0004

## 2021-04-26 NOTE — ED TRIAGE NOTES
"Flex sig last Thursday. Now has frequent bloody diarrhea with golf ball side clots (25 episodes since yesterday). Has abdominal pain on left side of abdomen. Has been on budesonide since Friday. Pt states left side of abdomen is now firm and her doctor wanted her to come to ED to make sure \"nothing went wrong in the flex sig.\" Pt states she is also still on medication for CDIFF.   "

## 2021-04-27 LAB
ALBUMIN SERPL-MCNC: 3.4 G/DL (ref 3.4–5)
ALP SERPL-CCNC: 57 U/L (ref 40–150)
ALT SERPL W P-5'-P-CCNC: 35 U/L (ref 0–50)
ANION GAP SERPL CALCULATED.3IONS-SCNC: 7 MMOL/L (ref 3–14)
AST SERPL W P-5'-P-CCNC: 11 U/L (ref 0–45)
BILIRUB SERPL-MCNC: 0.3 MG/DL (ref 0.2–1.3)
BUN SERPL-MCNC: 10 MG/DL (ref 7–30)
C COLI+JEJUNI+LARI FUSA STL QL NAA+PROBE: NOT DETECTED
CALCIUM SERPL-MCNC: 9.2 MG/DL (ref 8.5–10.1)
CHLORIDE SERPL-SCNC: 107 MMOL/L (ref 94–109)
CMV DNA SPEC NAA+PROBE-ACNC: NORMAL [IU]/ML
CMV DNA SPEC NAA+PROBE-LOG#: NORMAL {LOG_IU}/ML
CO2 SERPL-SCNC: 25 MMOL/L (ref 20–32)
CREAT SERPL-MCNC: 0.66 MG/DL (ref 0.52–1.04)
CRP SERPL-MCNC: 21 MG/L (ref 0–8)
EC STX1 GENE STL QL NAA+PROBE: NOT DETECTED
EC STX2 GENE STL QL NAA+PROBE: NOT DETECTED
ENTERIC PATHOGEN COMMENT: NORMAL
ERYTHROCYTE [DISTWIDTH] IN BLOOD BY AUTOMATED COUNT: 12.7 % (ref 10–15)
ERYTHROCYTE [SEDIMENTATION RATE] IN BLOOD BY WESTERGREN METHOD: 14 MM/H (ref 0–20)
GFR SERPL CREATININE-BSD FRML MDRD: >90 ML/MIN/{1.73_M2}
GLUCOSE BLDC GLUCOMTR-MCNC: 105 MG/DL (ref 70–99)
GLUCOSE BLDC GLUCOMTR-MCNC: 140 MG/DL (ref 70–99)
GLUCOSE BLDC GLUCOMTR-MCNC: 157 MG/DL (ref 70–99)
GLUCOSE SERPL-MCNC: 119 MG/DL (ref 70–99)
HCT VFR BLD AUTO: 38.8 % (ref 35–47)
HGB BLD-MCNC: 12.9 G/DL (ref 11.7–15.7)
MCH RBC QN AUTO: 31.3 PG (ref 26.5–33)
MCHC RBC AUTO-ENTMCNC: 33.2 G/DL (ref 31.5–36.5)
MCV RBC AUTO: 94 FL (ref 78–100)
NOROV GI+II ORF1-ORF2 JNC STL QL NAA+PR: NOT DETECTED
O+P STL MICRO: NORMAL
PLATELET # BLD AUTO: 289 10E9/L (ref 150–450)
POTASSIUM SERPL-SCNC: 4 MMOL/L (ref 3.4–5.3)
PROT SERPL-MCNC: 7 G/DL (ref 6.8–8.8)
RBC # BLD AUTO: 4.12 10E12/L (ref 3.8–5.2)
RVA NSP5 STL QL NAA+PROBE: NOT DETECTED
SALMONELLA SP RPOD STL QL NAA+PROBE: NOT DETECTED
SHIGELLA SP+EIEC IPAH STL QL NAA+PROBE: NOT DETECTED
SODIUM SERPL-SCNC: 139 MMOL/L (ref 133–144)
SPECIMEN SOURCE: NORMAL
SPECIMEN SOURCE: NORMAL
V CHOL+PARA RFBL+TRKH+TNAA STL QL NAA+PR: NOT DETECTED
WBC # BLD AUTO: 15.5 10E9/L (ref 4–11)
Y ENTERO RECN STL QL NAA+PROBE: NOT DETECTED

## 2021-04-27 PROCEDURE — 999N001017 HC STATISTIC GLUCOSE BY METER IP

## 2021-04-27 PROCEDURE — 36415 COLL VENOUS BLD VENIPUNCTURE: CPT | Performed by: STUDENT IN AN ORGANIZED HEALTH CARE EDUCATION/TRAINING PROGRAM

## 2021-04-27 PROCEDURE — 250N000013 HC RX MED GY IP 250 OP 250 PS 637: Performed by: STUDENT IN AN ORGANIZED HEALTH CARE EDUCATION/TRAINING PROGRAM

## 2021-04-27 PROCEDURE — 86140 C-REACTIVE PROTEIN: CPT | Performed by: STUDENT IN AN ORGANIZED HEALTH CARE EDUCATION/TRAINING PROGRAM

## 2021-04-27 PROCEDURE — 85652 RBC SED RATE AUTOMATED: CPT | Performed by: STUDENT IN AN ORGANIZED HEALTH CARE EDUCATION/TRAINING PROGRAM

## 2021-04-27 PROCEDURE — 120N000002 HC R&B MED SURG/OB UMMC

## 2021-04-27 PROCEDURE — 250N000012 HC RX MED GY IP 250 OP 636 PS 637: Performed by: STUDENT IN AN ORGANIZED HEALTH CARE EDUCATION/TRAINING PROGRAM

## 2021-04-27 PROCEDURE — 80053 COMPREHEN METABOLIC PANEL: CPT | Performed by: STUDENT IN AN ORGANIZED HEALTH CARE EDUCATION/TRAINING PROGRAM

## 2021-04-27 PROCEDURE — 36415 COLL VENOUS BLD VENIPUNCTURE: CPT | Performed by: NURSE PRACTITIONER

## 2021-04-27 PROCEDURE — 85027 COMPLETE CBC AUTOMATED: CPT | Performed by: STUDENT IN AN ORGANIZED HEALTH CARE EDUCATION/TRAINING PROGRAM

## 2021-04-27 PROCEDURE — 86481 TB AG RESPONSE T-CELL SUSP: CPT | Performed by: NURSE PRACTITIONER

## 2021-04-27 PROCEDURE — 96376 TX/PRO/DX INJ SAME DRUG ADON: CPT | Performed by: EMERGENCY MEDICINE

## 2021-04-27 PROCEDURE — 99223 1ST HOSP IP/OBS HIGH 75: CPT | Performed by: NURSE PRACTITIONER

## 2021-04-27 PROCEDURE — 250N000011 HC RX IP 250 OP 636: Performed by: STUDENT IN AN ORGANIZED HEALTH CARE EDUCATION/TRAINING PROGRAM

## 2021-04-27 PROCEDURE — 99233 SBSQ HOSP IP/OBS HIGH 50: CPT | Mod: GC | Performed by: INTERNAL MEDICINE

## 2021-04-27 RX ORDER — LIDOCAINE 40 MG/G
CREAM TOPICAL
Status: DISCONTINUED | OUTPATIENT
Start: 2021-04-27 | End: 2021-05-03 | Stop reason: HOSPADM

## 2021-04-27 RX ORDER — VANCOMYCIN HYDROCHLORIDE 125 MG/1
125 CAPSULE ORAL DAILY
Status: COMPLETED | OUTPATIENT
Start: 2021-04-27 | End: 2021-05-02

## 2021-04-27 RX ORDER — VENLAFAXINE HYDROCHLORIDE 75 MG/1
225 CAPSULE, EXTENDED RELEASE ORAL DAILY
Status: DISCONTINUED | OUTPATIENT
Start: 2021-04-27 | End: 2021-04-27

## 2021-04-27 RX ORDER — TRIAMCINOLONE ACETONIDE 0.1 %
PASTE (GRAM) DENTAL 2 TIMES DAILY
Status: DISCONTINUED | OUTPATIENT
Start: 2021-04-27 | End: 2021-05-03 | Stop reason: HOSPADM

## 2021-04-27 RX ORDER — VITAMIN B COMPLEX
25 TABLET ORAL DAILY
Status: DISCONTINUED | OUTPATIENT
Start: 2021-04-27 | End: 2021-04-30

## 2021-04-27 RX ORDER — PREDNISONE 5 MG/1
5 TABLET ORAL DAILY
Status: DISCONTINUED | OUTPATIENT
Start: 2021-04-27 | End: 2021-05-03 | Stop reason: HOSPADM

## 2021-04-27 RX ORDER — DEXTROSE MONOHYDRATE 25 G/50ML
25-50 INJECTION, SOLUTION INTRAVENOUS
Status: DISCONTINUED | OUTPATIENT
Start: 2021-04-27 | End: 2021-05-03 | Stop reason: HOSPADM

## 2021-04-27 RX ORDER — VANCOMYCIN HYDROCHLORIDE 125 MG/1
125 CAPSULE ORAL 2 TIMES DAILY
Status: DISCONTINUED | OUTPATIENT
Start: 2021-04-27 | End: 2021-04-27

## 2021-04-27 RX ORDER — HYDROXYZINE HYDROCHLORIDE 25 MG/1
25 TABLET, FILM COATED ORAL DAILY PRN
Status: DISCONTINUED | OUTPATIENT
Start: 2021-04-27 | End: 2021-04-29

## 2021-04-27 RX ORDER — VANCOMYCIN HYDROCHLORIDE 125 MG/1
125 CAPSULE ORAL EVERY OTHER DAY
Status: DISCONTINUED | OUTPATIENT
Start: 2021-05-03 | End: 2021-05-03 | Stop reason: HOSPADM

## 2021-04-27 RX ORDER — NICOTINE POLACRILEX 4 MG
15-30 LOZENGE BUCCAL
Status: DISCONTINUED | OUTPATIENT
Start: 2021-04-27 | End: 2021-05-03 | Stop reason: HOSPADM

## 2021-04-27 RX ORDER — TRAMADOL HYDROCHLORIDE 50 MG/1
50 TABLET ORAL EVERY 6 HOURS PRN
Status: DISCONTINUED | OUTPATIENT
Start: 2021-04-27 | End: 2021-05-03 | Stop reason: HOSPADM

## 2021-04-27 RX ORDER — ACETAMINOPHEN 325 MG/1
975 TABLET ORAL EVERY 6 HOURS PRN
Status: DISCONTINUED | OUTPATIENT
Start: 2021-04-27 | End: 2021-05-03 | Stop reason: HOSPADM

## 2021-04-27 RX ORDER — VENLAFAXINE HYDROCHLORIDE 225 MG/1
225 TABLET, EXTENDED RELEASE ORAL
Status: DISCONTINUED | OUTPATIENT
Start: 2021-04-27 | End: 2021-05-03 | Stop reason: HOSPADM

## 2021-04-27 RX ORDER — ROSUVASTATIN CALCIUM 5 MG/1
10 TABLET, COATED ORAL DAILY
Status: DISCONTINUED | OUTPATIENT
Start: 2021-04-27 | End: 2021-05-03 | Stop reason: HOSPADM

## 2021-04-27 RX ORDER — GABAPENTIN 300 MG/1
900 CAPSULE ORAL 4 TIMES DAILY
Status: DISCONTINUED | OUTPATIENT
Start: 2021-04-27 | End: 2021-05-03 | Stop reason: HOSPADM

## 2021-04-27 RX ADMIN — GABAPENTIN 900 MG: 300 CAPSULE ORAL at 20:22

## 2021-04-27 RX ADMIN — VANCOMYCIN HYDROCHLORIDE 125 MG: 125 CAPSULE ORAL at 08:49

## 2021-04-27 RX ADMIN — TRAMADOL HYDROCHLORIDE 50 MG: 50 TABLET ORAL at 20:22

## 2021-04-27 RX ADMIN — METHYLPREDNISOLONE SODIUM SUCCINATE 32 MG: 40 INJECTION, POWDER, LYOPHILIZED, FOR SOLUTION INTRAMUSCULAR; INTRAVENOUS at 08:50

## 2021-04-27 RX ADMIN — METHYLPREDNISOLONE SODIUM SUCCINATE 32 MG: 40 INJECTION, POWDER, LYOPHILIZED, FOR SOLUTION INTRAMUSCULAR; INTRAVENOUS at 20:22

## 2021-04-27 RX ADMIN — ENOXAPARIN SODIUM 40 MG: 100 INJECTION SUBCUTANEOUS at 09:59

## 2021-04-27 RX ADMIN — CALCIUM CARBONATE 600 MG (1,500 MG)-VITAMIN D3 400 UNIT TABLET 1 TABLET: at 08:49

## 2021-04-27 RX ADMIN — ACETAMINOPHEN 975 MG: 325 TABLET, FILM COATED ORAL at 22:24

## 2021-04-27 RX ADMIN — ACETAMINOPHEN 975 MG: 325 TABLET, FILM COATED ORAL at 05:12

## 2021-04-27 RX ADMIN — INSULIN ASPART 1 UNITS: 100 INJECTION, SOLUTION INTRAVENOUS; SUBCUTANEOUS at 11:27

## 2021-04-27 RX ADMIN — ACETAMINOPHEN 975 MG: 325 TABLET, FILM COATED ORAL at 14:02

## 2021-04-27 RX ADMIN — GABAPENTIN 900 MG: 300 CAPSULE ORAL at 08:48

## 2021-04-27 RX ADMIN — VENLAFAXINE HYDROCHLORIDE 225 MG: 225 TABLET, EXTENDED RELEASE ORAL at 08:50

## 2021-04-27 RX ADMIN — GABAPENTIN 900 MG: 300 CAPSULE ORAL at 13:44

## 2021-04-27 RX ADMIN — GABAPENTIN 900 MG: 300 CAPSULE ORAL at 15:38

## 2021-04-27 RX ADMIN — TRAMADOL HYDROCHLORIDE 50 MG: 50 TABLET ORAL at 09:59

## 2021-04-27 RX ADMIN — Medication 1 TABLET: at 08:50

## 2021-04-27 RX ADMIN — TRIAMCINOLONE ACETONIDE: 1 PASTE DENTAL at 09:58

## 2021-04-27 RX ADMIN — ROSUVASTATIN CALCIUM 10 MG: 10 TABLET, FILM COATED ORAL at 08:49

## 2021-04-27 RX ADMIN — Medication 25 MCG: at 08:48

## 2021-04-27 ASSESSMENT — ACTIVITIES OF DAILY LIVING (ADL)
DRESSING/BATHING_DIFFICULTY: NO
CONCENTRATING,_REMEMBERING_OR_MAKING_DECISIONS_DIFFICULTY: NO
WEAR_GLASSES_OR_BLIND: YES
DIFFICULTY_EATING/SWALLOWING: NO
FALL_HISTORY_WITHIN_LAST_SIX_MONTHS: NO
DOING_ERRANDS_INDEPENDENTLY_DIFFICULTY: NO
TOILETING_ISSUES: NO
DIFFICULTY_COMMUNICATING: NO
WALKING_OR_CLIMBING_STAIRS_DIFFICULTY: NO

## 2021-04-27 ASSESSMENT — MIFFLIN-ST. JEOR: SCORE: 1739.96

## 2021-04-27 NOTE — CONSULTS
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 Hgb stable in spite of persistent bloody stools. Her CRP is elevated to 20 from <2.9 on 4/15. The past 24 hours she has had >10 stools, all bloody. She is getting second dose IV steroid this am. Her 4/22 colonic biopsies are without mention of CMV testing. Regarding initiation of biologic therapy, hepatitis B and tb testing last negative 2018.      Recommendations  -Proctofoam BID to help with tenesmus (rectal pain, fecal urgency)  -ADAT  -Track stool frequency and % with blood  -Daily CRP, hgb  -Fecal calprotectin  -Quant gold and hepatitis B serologies in preparation for further biologic treatment  -Continue BID 32 mg Solumedrol IV    GI will continue to follow. Thank you for involving us in this patient's care. Please do not hesitate to contact the GI service with any questions or concerns.     Pt care plan discussed with Dr. Boudreaux, GI staff physician.    AMADA Bustillo CNP  Text page  -------------------------------------------------------------------------------------------------------------------          Chief Complaint:   We were asked by Dr. Guerrero of medicine to evaluate this patient with bloody stools and new UC diagnosis    History is obtained from the patient and the medical record.          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    Continues on suppressive vancomycin. Dealing with persistent LLQ, worse with gas/bm, fatigue and bloody bowel movements. Continues to pass bloody with all BM. 10 yesterday, 15 the day before, this has been her normal range the past 1-2 weeks. She is also having more arthralgias generally. Had 3 weeks of face rash that cleared last week. Diet has been minimal, some oatmeal, cheese, etc. Feels hungry but food runs right through her. Drinking clears ok.    2018 checkpoint inhibitor colitis and arthritis on nivolumab. She was trialed on prednisone 100 mg daily and oral mesalamine without response. Then received one dose of infliximab and further outpatient treatments with vedolizumab (dose at 0,2, and 6 weeks) then discontinued as symptoms improved and she had normal egd/colonoscopy in 2019.    Due to seronegative RA she has been on corticosteroids the past three years, tapered down to 5 mg daily decreasing by 1 mg/week since 8/2020. She is now on Actemra after failed abalimumab and Enbrel trials, last dose scheduled for 4/25 but not yet taken.    Presumed UC diagnosed endoscopically on 4/22 with flex with biopsy results of 'colon, sigmoid, and rectum' with moderate active chronic colitis with crypt architectural distrotion. No commentary on CMV. At that time she was prescribed budesonide 9 mg (started 4/23) daily and conrtifoam enemas (Rx not ready, has not started). Dr. Keenan of [place] was considering starting her on vedolizumab due to her positive past treatment response.     She does report that due to the long drive  to [place] she would like to establish care for her IBD here at the [facility].      She does not take NSAIDs, is a former smoker (5 pack year hx, quit ), and drinks socially.        Past Medical History:   Reviewed and edited as appropriate  Past Medical History:   Diagnosis Date     Abnormal MRI     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      Anxiety      Basal cell carcinoma      Cervical high risk HPV (human papillomavirus) test positive 2019    See problem list     Colitis      Depression      Diabetes mellitus, iatrogenic (H) 2020     Inflammatory arthritis      Insomnia      Intestinal giardiasis 3/5/2018     Lumbago     left lower back pain     Lymphedema      Malignant melanoma (H)      Melanoma (H) 10/23/2017     Mild persistent asthma      Obesity      STORMY (obstructive sleep apnea)      Prothrombin deficiency (H)     takes 81mg asa daily     Stroke (cerebrum) (H)     During      TIA (transient ischemic attack)      Type 2 diabetes mellitus (H)             Past Surgical History:   Reviewed and edited as appropriate   Past Surgical History:   Procedure Laterality Date     APPENDECTOMY       COLONOSCOPY N/A 10/18/2017    Procedure: COLONOSCOPY;  Colon;  Surgeon: Debbie Stephens MD;  [facility]     COLONOSCOPY N/A 3/9/2018    Procedure: COMBINED COLONOSCOPY, SINGLE OR MULTIPLE BIOPSY/POLYPECTOMY BY BIOPSY;  colon;  Surgeon: Benita Schumacher MD;  Location: [facility]     COLONOSCOPY      multiple since  to present - about 6 total     DISSECT LYMPH NODE AXILLA Left 10/23/2017    Procedure: DISSECT LYMPH NODE AXILLA;  Left Axillary Lymph Node Dissection ;  Surgeon: Laurent Cool MD;  [facility]     EXAM UNDER ANESTHESIA PELVIC N/A 2020    Procedure: EXAM UNDER ANESTHESIA, PELVIS; with Cervical Biopsies, Vaginal Biopsy and Endocervical Curettings;  Surgeon: Melina Jung MD;  Location: [facility] OR     GYN SURGERY  ,          REPAIR  MOHS Left 7/25/2017    Procedure: REPAIR MOHS;  Left Upper Lid Moh's Reconstruction;  Surgeon: Kisha Bosch MD;  [facility]            Previous Endoscopy:     4/22/21 flex    Diffuse        moderate inflammation characterized by congestion (edema), erythema and        shallow ulcerations was found in the rectum and in the sigmoid colon.        Biopsies were taken with a cold forceps for histology. Verification of        patient identification for the specimen was done.      Results for orders placed or performed during the hospital encounter of 03/08/18   COLONOSCOPY   Result Value Ref Range    COLONOSCOPY       [facility], [address] [place] [address] [phone]     Endoscopy Department  _______________________________________________________________________________  Patient Name: Doretha Fernandez            Procedure Date: 3/9/2018 10:30 AM  MRN: [id number]                       Account Number: [id number]  YOB: 1975              Admit Type: Inpatient  Age: 43                                Gender: Female  Note Status: Finalized                Attending MD: Benita Schumacher MD  Pause for the Cause: performed.       Total Sedation Time: 20 minutes of   continuous 1:1 bedside monitoring performed.  _______________________________________________________________________________     Procedure:           Colonoscopy  Indications:         Hematochezia, 44 yo F wtih hematochezia and diarrhea                        with stool + giardia, currently on Opdivo for metastatic                        melanoma. CT showed left sided colitis.  Providers:           Pj Schumacher MD, Renard Cosme, TEJINDER  Referring MD:          Medicines:           Midazolam 1.5 mg IV, Meperidine 25 mg IV,                        Diphenhydramine 25 mg IV  Complications:       No immediate complications.  _______________________________________________________________________________  Procedure:            Pre-Anesthesia Assessment:                       - Prior to the procedure, a History and Physical was                        performed, and patient medications and allergies were                        reviewed. The patient is competent. The risks and                        benefits of the procedure and the sedation options and                        risks were discussed with the patient. All questions                        were answered and informed consent was obtained. Patient                        identification and proposed procedure were verified by                        the physician and the nurse in the pre-procedure area in                        the procedure  room. Mental Status Examination: alert and                        oriented. Airway Examination: normal oropharyngeal                        airway and neck mobility. Respiratory Examination: clear                        to auscultation. CV Examination: normal. Prophylactic                        Antibiotics: The patient does not require prophylactic                        antibiotics. Prior Anticoagulants: The patient has taken                        no previous anticoagulant or antiplatelet agents. ASA                        Grade Assessment: II - A patient with mild systemic                        disease. After reviewing the risks and benefits, the                        patient was deemed in satisfactory condition to undergo                        the procedure. The anesthesia plan was to use moderate                        sedation / analgesia (conscious sedation). Immediately                        prior to administration of medications, the patient was                        re- assessed for adequacy to receive sedatives. The heart                        rate, respiratory rate, oxygen saturations, blood                        pressure, adequacy of pulmonary ventilation, and                        response to care were monitored throughout  the                        procedure. The physical status of the patient was                        re-assessed after the procedure.                       After obtaining informed consent, the colonoscope was                        passed under direct vision. Throughout the procedure,                        the patient's blood pressure, pulse, and oxygen                        saturations were monitored continuously. The pediatric                        colonoscope was introduced through the anus and advanced                        to the transverse colon for evaluation. This was the                        intended extent. Due to poor prep and patient                        discomfort, scope was not advanced further. The                         colonoscopy was performed without difficulty. The                        patient tolerated the procedure well. The quality of the                        bowel preparation was poor, with solid stool throughout                        the exam.                                                                                   Findings:       The perianal and digital rectal examinations were normal.       A diffuse area of moderately congested, erythematous, granular and        vascular-pattern-decreased mucosa was found in the rectum, in the        recto-sigmoid colon, in the sigmoid colon, in the descending colon and        in the transverse colon. There was overlying exudate that could be        washed off. This was most severe in the rectosigmoid area, which        contained patchy areas of petechiae. Biopsies were taken with a cold        forceps for histology (rushed).                                                                                   Impression:           - Preparation of the colon was poor.                       - Congested, erythematous, granular and                        vascular-pattern-decreased mucosa in the rectum, in the                         recto-sigmoid colon, in the sigmoid colon, in the                        descending colon and in the transverse colon. Biopsied.  Recommendation:      - Return patient to hospital [facility] for ongoing care.                       - Resume previous diet.                       - Continue present medications.                       - Await pathology results (rushed).                                                                                     Electronically signed by: Benita Schumacher MD  __________________  Benita Schumacher MD  3/9/2018 12:14:51 PM  I was physically present for the entire viewing portion of the exam.  __________________________  Signature of teaching physician  Gauri/Dominik Schumacher MD  Number of Addenda: 0    Note Initiated On: 3/9/2018 8:16 AM              Social History:   Reviewed and edited as appropriate  Social History     Socioeconomic History     Marital status:      Spouse name: Not on file     Number of children: Not on file     Years of education: Not on file     Highest education level: Not on file   Occupational History     Not on file   Social Needs     Financial resource strain: Not on file     Food insecurity     Worry: Not on file     Inability: Not on file     Transportation needs     Medical: Not on file     Non-medical: Not on file   Tobacco Use     Smoking status: Former Smoker     Packs/day: 1.00     Years: 5.00     Pack years: 5.00     Quit date: 3/20/1998     Years since quittin.1     Smokeless tobacco: Never Used   Substance and Sexual Activity     Alcohol use: Yes     Comment: occ     Drug use: No     Sexual activity: Not Currently     Partners: Male     Birth control/protection: Surgical   Lifestyle     Physical activity     Days per week: Not on file     Minutes per session: Not on file     Stress: Not on file   Relationships     Social connections     Talks on phone: Not on file     Gets together: Not on file     Attends Mandaen service: Not on file      Active member of club or organization: Not on file     Attends meetings of clubs or organizations: Not on file     Relationship status: Not on file     Intimate partner violence     Fear of current or ex partner: Not on file     Emotionally abused: Not on file     Physically abused: Not on file     Forced sexual activity: Not on file   Other Topics Concern     Parent/sibling w/ CABG, MI or angioplasty before 65F 55M? No   Social History Narrative    19 y.o- patient's mother   of lung cancer. She had to take care of her younger sister.    2012- patient's  had a heart attack with stents placed, followed by cardiac rehabilitation    2000 TO 2012  was in [facility] for depression    2013 patient's  went through alcohol rehabilitation at [facility] inpatient            They have attended couple counseling a couple of times and patient went to the family program for chemical dependency.    Patient denies alcohol or drug use and herself            Has 2 children, girls ages 10 and 13     For a while she was working 3 jobs since her  was ill. Works at the [employer] for [employer]. Reports her job is very stressful.                Family History:   Reviewed and edited as appropriate  Family History   Problem Relation Age of Onset     Cancer Mother 45        lung     Neurologic Disorder Mother         epilepsy     Lipids Father      Gastrointestinal Disease Father         diverticulitis      Depression Father      Cancer Maternal Grandmother      Blood Disease Maternal Grandmother         lymphoma      Arthritis Maternal Grandmother      Diabetes Maternal Grandmother      Depression Maternal Grandmother      Macular Degeneration Maternal Grandmother      Glaucoma Maternal Grandmother      Diabetes Maternal Grandfather      Cerebrovascular Disease Maternal Grandfather      Blood Disease Maternal Grandfather      Heart Disease  Maternal Grandfather      Glaucoma Maternal Grandfather      Cancer Paternal Grandmother      Cancer - colorectal Paternal Grandmother      Respiratory Paternal Grandfather         emphysema      Heart Disease Daughter      Asthma Daughter      Depression Sister      Melanoma No family hx of             Allergies:   Reviewed and edited as appropriate     Allergies   Allergen Reactions     Bee Venom Swelling     Azithromycin Diarrhea     Erythromycin      Other reaction(s): GI intolerance, Vomiting     Fentanyl Other (See Comments)     sweating  sweating     Prochlorperazine Fatigue     Other reaction(s): Other (see comments)  Fatigue     Buspirone      Other reaction(s): GI intolerance  vomiting     Erythrocin Nausea and Vomiting     Zithromax [Azithromycin Dihydrate] Diarrhea     Enbrel [Etanercept] Hives and Rash            Medications:     Current Facility-Administered Medications   Medication     acetaminophen (TYLENOL) tablet 975 mg     calcium carbonate 600 mg-vitamin D 400 units (CALTRATE) per tablet 1 tablet     glucose gel 15-30 g    Or     dextrose 50 % injection 25-50 mL    Or     glucagon injection 1 mg     enoxaparin ANTICOAGULANT (LOVENOX) injection 40 mg     ferrous fumarate 65 mg ([place]. FE)-Vitamin C 125 mg (VITRON C) tablet 1 tablet     gabapentin (NEURONTIN) capsule 900 mg     hydrOXYzine (ATARAX) tablet 25 mg     insulin aspart (NovoLOG) injection (RAPID ACTING)     insulin aspart (NovoLOG) injection (RAPID ACTING)     lidocaine (LMX4) cream     lidocaine 1 % 1 mL     methylPREDNISolone sodium succinate (solu-MEDROL) injection 32 mg     [Held by provider] predniSONE (DELTASONE) tablet 5 mg     rosuvastatin (CRESTOR) tablet 10 mg     sodium chloride (PF) 0.9% PF flush 3 mL     sodium chloride (PF) 0.9% PF flush 3 mL     traMADol (ULTRAM) tablet 50 mg     triamcinolone (KENALOG) 0.1 % paste     [START ON 5/3/2021] vancomycin (VANCOCIN) capsule 125 mg     vancomycin (VANCOCIN) capsule 125 mg      venlafaxine (EFFEXOR-ER) 24 hr tablet 225 mg     Vitamin D3 (CHOLECALCIFEROL) tablet 25 mcg     Current Outpatient Medications   Medication Sig     ACCU-CHEK GUIDE test strip USE TO TEST BLOOD SUGAR TWO TIMES A DAY OR AS DIRECTED     acetaminophen (TYLENOL) 500 MG tablet Take 1,000 mg by mouth every 8 hours as needed for mild pain     blood glucose monitoring (ACCU-CHEK FASTCLIX) lancets USE TWO TIMES A DAY OR AS DIRECTED     Calcium Carb-Cholecalciferol 600-800 MG-UNIT TABS Take 800 mg by mouth daily before breakfast     ferrous fumarate 65 mg, [place]. FE,-Vitamin C 125 mg (VITRON-C)  MG TABS tablet Take 1 tablet by mouth daily     gabapentin (NEURONTIN) 300 MG capsule TAKE THREE CAPSULES BY MOUTH FOUR TIMES A DAY     hydrOXYzine (ATARAX) 25 MG tablet TAKE ONE TABLET BY MOUTH AT BEDTIME (Patient taking differently: As needed)     metFORMIN (GLUCOPHAGE-XR) 500 MG 24 hr tablet Take 1,000 mg by mouth Daily with breakfast.     ondansetron (ZOFRAN) 8 MG tablet Take 8 mg by mouth every 8 hours as needed for nausea (Pt has not taken in past year 2/10/20)      predniSONE (DELTASONE) 5 MG tablet Take 5 mg by mouth daily     rosuvastatin (CRESTOR) 10 MG tablet Take 1 tablet (10 mg) by mouth daily     tocilizumab (ACTEMRA) 162 MG/0.9ML subcutaneous injection Inject 162 mg Subcutaneous every 14 days     vancomycin (VANCOCIN) 125 MG capsule Take 1 capsule (125 mg) by mouth 4 times daily for 3 days, THEN 1 capsule (125 mg) 2 times daily for 7 days, THEN 1 capsule (125 mg) daily for 7 days, THEN 1 capsule (125 mg) every other day for 14 days.     venlafaxine (EFFEXOR-ER) 225 MG 24 hr tablet TAKE ONE TABLET BY MOUTH ONCE DAILY     VITAMIN D3 25 MCG (1000 UT) tablet TAKE THREE TABLETS BY MOUTH ONCE DAILY     Facility-Administered Medications Ordered in Other Encounters   Medication     lidocaine 1 % 9 mL     sodium bicarbonate 8.4 % injection 1 mEq             Review of Systems:     A complete review of systems was performed  "and is negative except as noted in the HPI           Physical Exam:   BP (!) 142/75   Pulse 72   Temp 98.8  F (37.1  C) (Oral)   Resp 14   Ht 1.6 m (5' 3\")   Wt 112 kg (247 lb)   SpO2 96%   BMI 43.75 kg/m    Wt:   Wt Readings from Last 2 Encounters:   21 112 kg (247 lb)   21 115.7 kg (255 lb)      Constitutional: cooperative, pleasant, not dyspneic/diaphoretic, no acute distress  Eyes: Sclera anicteric/injected  Ears/nose/mouth/throat: Normal oropharynx without ulcers or exudate, mucus membranes moist, hearing intact  Neck: supple without obvious mass  CV: No edema  Respiratory: Unlabored breathing  Lymph: No submandibular, supraclavicular or inguinal lymphadenopathy  Abd: Nondistended, +bs, no hepatosplenomegaly, L sided tenderness, no peritoneal signs  Skin: warm, perfused, no jaundice  Neuro: AAO x 3  Psych: Normal affect  MSK: No gross deformities         Data:   Labs and imaging below were independently reviewed and interpreted    BMP  Recent Labs   Lab 21  1835    138   POTASSIUM 3.9 3.9   CHLORIDE  --  106   PATRICIA  --  9.5   CO2  --  26   BUN  --  11   CR  --  0.71   * 111*     CBC  Recent Labs   Lab 21  1835   WBC  --  14.5*   RBC  --  3.99   HGB 12.6 12.3   HCT  --  37.0   MCV  --  93   MCH  --  30.8   MCHC  --  33.2   RDW  --  12.9   PLT  --  291     INRNo lab results found in last 7 days.  LFTs  Recent Labs   Lab 21  1835   ALKPHOS 55   AST 15   ALT 41   BILITOTAL 0.2   PROTTOTAL 7.2   ALBUMIN 3.8      PANCNo lab results found in last 7 days.    Imagin2021 CT ab pelvis    IMPRESSION:   1.  No evidence for active colitis.  2.  Colonic diverticulosis without evidence for diverticulitis.     I have personally reviewed the examination and initial interpretation  and I agree with the findings.     AISA ARAGON MD    "

## 2021-04-27 NOTE — H&P
[facility]    History and Physical - [facility] Night Service        Date of Admission:  4/26/2021    Assessment & Plan   Doretha Fernandez is a 46 year old female admitted on 4/26/2021. She has a history of metastatic melanoma of unknown primary, seronegative rheumatoid arthritis (on prednisone and tocilizumab), steroid-induced diabetes, C.difficile infection, prior hx of nivolumab-induced colitis in 2018 who is admitted for ulcerative colitis flare.    IBD flare (ulcerative colitis)  C.difficile colitis  History of nivolumab-induced colitis  Colonic diverticulosis  Patient has a history of colitis after treatment with nivolumab for melanoma in 2018 which was refractory to steroid treatment but improved with infliximab and vedolizumab. Now developed 1 month history of abdominal pain, diarrhea with BRBPR. Tested positive for C.difficile 4/5/2021 and started on PO vancomycin 4/7/2021 with minimal improvement. Had flexible sigmoidoscopy (4/22/2021) showed diffuse inflammation with biopsy result consistent with active chronic colitis, thought to be diagnostic for ulcerative colitis. C.difficile on admission negative.  - Start IV solumedrol 32 mg BID  - Continue vancomycin PO taper, currently on 125 mg daily per last prescription  - Follow enteric stool panel and ova&parasite result. Fecal calprotectin.  - Follow CMV PCR  - Trend CRP, CBC, CMP  - Monitor stool output  - Pain control with acetaminophen and tramadol PRN  - GI consult for management of IBD flare    Seronegative RA  Trials of adalimumab and Enbrel were not successful. Has required corticosteroid therapy for the past three years with which she has had significant difficulty in tapering. Does not appears to have acute flare on presentation.  - Hold PTA prednisone  - Patient due for Tocilizumab (last dose 4/11), will hold-off for now with steroid treatment. Consider discuss with rheumatology.    Steroid-induced DM  On  chronic steroid treatment for ICI-induced colitis and seronegative RA. Anticipate hyperglycemia now that high-dose steroid initiated for treatment of IBD flare.  - BS q4h, target 140-180 mg/dL. Hypoglycemia protocol.  - Low sliding scale insulin while NPO  - Hold PTA metformin 1000 mg daily    SANDRA/MDD  -Continue PTA Venlafaxine.     STORMY  Morbid obesity  - CPAP home setting     RLS:   - Continue with PTA Gabapentin 900 mg QID    Metastatic melanoma  Diagnosed on lymph node biopsy in Oct 2017, unclear primary. Treated with nivolumab and developed nivolumab-induced colitis in May 2018 treated as above. Now in complete remission and received no treatment for the past 2 years.    Diet: NPO per Anesthesia Guidelines for Procedure/Surgery Except for: Meds  Fluids: None  DVT Prophylaxis: Enoxaparin (Lovenox) SQ  Major Catheter: not present  Code Status: Full Code       Disposition Plan   Expected discharge: 4 - 7 days, recommended to prior living arrangement once adequate pain management/ tolerating PO medications and IBD flare treated.  Entered: Oriana Ramos MD 04/27/2021, 3:26 AM     The patient's care was discussed with the Attending Physician, Dr. Holm.    Oriana Ramos MD  [facility]  Contact information available via [facility] Paging/Directory  Please see sign in/sign out for up to date coverage information  ______________________________________________________________________    Chief Complaint   Abdominal pain and BRBPR for 1 month    History is obtained from the patient    History of Present Illness   Doretha Fernandez is a 46 year old female who has a history of metastatic melanoma of unknown primary, seronegative rheumatoid arthritis (on prednisone and tocilizumab), steroid-induced diabetes, C.difficile infection, prior hx of nivolumab-induced colitis in 2018, and prothrombin deficiency; who is admitted for ulcerative colitis  flare.    Patient reports history of abdominal pain and bloody diarrhea for about a month. Initially had watery diarrhea with intermittent blood in stool but has worsened to the point that, over the past few days, she passes pure blood per rectum and has 10-15 episodes of BRBPR. She has had tenesmus, urgency, and severe L sided abd pain worsening since her flex sig 4 days ago. Denies fever. Denies nausea/vomiting.    She initially tested positive for C.difficile 2021 and prescribed PO vancomycin with no improvement and had a hospitalization here for C.difficile in early April. Due to the persistence and severity of her symptoms, despite a long PO vanco taper, she had flexible sigmoidoscopy (2021) through [facility] with biopsies taken. She received a call from her [facility] doc today saying that they were diagnostic for ulcerative colitis.     Review of Systems    The 10 point Review of Systems is negative other than noted in the HPI or here.    Past Medical History    I have reviewed this patient's medical history and updated it with pertinent information if needed.   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      Anxiety      Basal cell carcinoma      Cervical high risk HPV (human papillomavirus) test positive 2019    See problem list     Colitis      Depression      Diabetes mellitus, iatrogenic (H) 2020     Inflammatory arthritis      Insomnia      Intestinal giardiasis 3/5/2018     Lumbago     left lower back pain     Lymphedema      Malignant melanoma (H)      Melanoma (H) 10/23/2017     Mild persistent asthma      Obesity      STORMY (obstructive sleep apnea)      Prothrombin deficiency (H)     takes 81mg asa daily     Stroke (cerebrum) (H)     During      TIA (transient ischemic attack)      Type 2 diabetes mellitus (H)       Past Surgical History   I have reviewed this patient's surgical history and updated it  with pertinent information if needed.  Past Surgical History:   Procedure Laterality Date     APPENDECTOMY       COLONOSCOPY N/A 10/18/2017    Procedure: COLONOSCOPY;  Colon;  Surgeon: Debbie Stephens MD;  [facility]     COLONOSCOPY N/A 3/9/2018    Procedure: COMBINED COLONOSCOPY, SINGLE OR MULTIPLE BIOPSY/POLYPECTOMY BY BIOPSY;  colon;  Surgeon: Benita Schumacher MD;  Location: [facility] GI     COLONOSCOPY      multiple since  to present - about 6 total     DISSECT LYMPH NODE AXILLA Left 10/23/2017    Procedure: DISSECT LYMPH NODE AXILLA;  Left Axillary Lymph Node Dissection ;  Surgeon: Laurent Cool MD;  Location: [facility] OR     EXAM UNDER ANESTHESIA PELVIC N/A 2020    Procedure: EXAM UNDER ANESTHESIA, PELVIS; with Cervical Biopsies, Vaginal Biopsy and Endocervical Curettings;  Surgeon: Melina Jung MD;  Location: [facility] OR     GYN SURGERY  ,          REPAIR MOHS Left 2017    Procedure: REPAIR MOHS;  Left Upper Lid Moh's Reconstruction;  Surgeon: Kisha Bosch MD;  Location: [facility] OR      Social History   I have reviewed this patient's social history and updated it with pertinent information if needed. Doretha Fernandez  reports that she quit smoking about 23 years ago. She has a 5.00 pack-year smoking history. She has never used smokeless tobacco. She reports current alcohol use. She reports that she does not use drugs.    Family History   I have reviewed this patient's family history and updated it with pertinent information if needed.  Family History   Problem Relation Age of Onset     Cancer Mother 45        lung     Neurologic Disorder Mother         epilepsy     Lipids Father      Gastrointestinal Disease Father         diverticulitis      Depression Father      Cancer Maternal Grandmother      Blood Disease Maternal Grandmother         lymphoma      Arthritis Maternal Grandmother      Diabetes Maternal Grandmother      Depression Maternal Grandmother      Macular Degeneration  Maternal Grandmother      Glaucoma Maternal Grandmother      Diabetes Maternal Grandfather      Cerebrovascular Disease Maternal Grandfather      Blood Disease Maternal Grandfather      Heart Disease Maternal Grandfather      Glaucoma Maternal Grandfather      Cancer Paternal Grandmother      Cancer - colorectal Paternal Grandmother      Respiratory Paternal Grandfather         emphysema      Heart Disease Daughter      Asthma Daughter      Depression Sister      Melanoma No family hx of      Prior to Admission Medications   Prior to Admission Medications   Prescriptions Last Dose Informant Patient Reported? Taking?   ACCU-CHEK GUIDE test strip   No No   Sig: USE TO TEST BLOOD SUGAR TWO TIMES A DAY OR AS DIRECTED   Calcium Carb-Cholecalciferol 600-800 MG-UNIT TABS   Yes No   Sig: Take 800 mg by mouth daily before breakfast   VITAMIN D3 25 MCG (1000 UT) tablet   No No   Sig: TAKE THREE TABLETS BY MOUTH ONCE DAILY   acetaminophen (TYLENOL) 500 MG tablet   Yes No   Sig: Take 1,000 mg by mouth every 8 hours as needed for mild pain   blood glucose monitoring (ACCU-CHEK FASTCLIX) lancets   No No   Sig: USE TWO TIMES A DAY OR AS DIRECTED   ferrous fumarate 65 mg, [place]. FE,-Vitamin C 125 mg (VITRON-C)  MG TABS tablet   No No   Sig: Take 1 tablet by mouth daily   gabapentin (NEURONTIN) 300 MG capsule   No No   Sig: TAKE THREE CAPSULES BY MOUTH FOUR TIMES A DAY   hydrOXYzine (ATARAX) 25 MG tablet   No No   Sig: TAKE ONE TABLET BY MOUTH AT BEDTIME   Patient taking differently: As needed   metFORMIN (GLUCOPHAGE-XR) 500 MG 24 hr tablet   Yes No   Sig: Take 1,000 mg by mouth Daily with breakfast.   ondansetron (ZOFRAN) 8 MG tablet  Self Yes No   Sig: Take 8 mg by mouth every 8 hours as needed for nausea (Pt has not taken in past year 2/10/20)    predniSONE (DELTASONE) 5 MG tablet   Yes No   Sig: Take 5 mg by mouth daily   rosuvastatin (CRESTOR) 10 MG tablet   No No   Sig: Take 1 tablet (10 mg) by mouth daily   tocilizumab  (ACTEMRA) 162 MG/0.9ML subcutaneous injection   Yes No   Sig: Inject 162 mg Subcutaneous every 14 days   vancomycin (VANCOCIN) 125 MG capsule   No No   Sig: Take 1 capsule (125 mg) by mouth 4 times daily for 3 days, THEN 1 capsule (125 mg) 2 times daily for 7 days, THEN 1 capsule (125 mg) daily for 7 days, THEN 1 capsule (125 mg) every other day for 14 days.   venlafaxine (EFFEXOR-ER) 225 MG 24 hr tablet   No No   Sig: TAKE ONE TABLET BY MOUTH ONCE DAILY      Facility-Administered Medications: None     Allergies   Allergies   Allergen Reactions     Bee Venom Swelling     Azithromycin Diarrhea     Erythromycin      Other reaction(s): GI intolerance, Vomiting     Fentanyl Other (See Comments)     sweating  sweating     Prochlorperazine Fatigue     Other reaction(s): Other (see comments)  Fatigue     Buspirone      Other reaction(s): GI intolerance  vomiting     Erythrocin Nausea and Vomiting     Zithromax [Azithromycin Dihydrate] Diarrhea     Enbrel [Etanercept] Hives and Rash     Physical Exam   Vital Signs: Temp: 98.8  F (37.1  C) Temp src: Oral BP: (!) 158/85 Pulse: 72   Resp: 15 SpO2: 94 % O2 Device: None (Room air)    Weight: 247 lbs 0 oz  Constitutional: awake, alert, cooperative, mild distress in pain  Eyes: Lids and lashes normal, pupils equal, round and reactive to light, extra ocular muscles intact, sclera clear, conjunctiva normal  Respiratory: No increased work of breathing, good air exchange, clear to auscultation bilaterally, no crackles or wheezing  Cardiovascular: Normal apical impulse, regular rate and rhythm, normal S1 and S2, and no murmur noted  GI: No scars, hyperactive bowel sounds, soft, non-distended. Mild tenderness to palpation on L side of abdomen without guarding or rebound  Skin: no bruising or bleeding and no redness, warmth, or swelling  Musculoskeletal: no lower extremity pitting edema present  there is no redness, warmth, or swelling of the joints  Neurologic: Awake, alert, oriented to  name, place and time. No focal deficit.    Data   Data reviewed today: I reviewed all medications, new labs and imaging results over the last 24 hours and discussed as pertinent in assessment and plan.    Recent Labs   Lab 04/26/21  1852 04/26/21  1835   WBC  --  14.5*   HGB 12.6 12.3   MCV  --  93   PLT  --  291    138   POTASSIUM 3.9 3.9   CHLORIDE  --  106   CO2  --  26   BUN  --  11   CR  --  0.71   ANIONGAP  --  5   PATRICIA  --  9.5   * 111*   ALBUMIN  --  3.8   PROTTOTAL  --  7.2   BILITOTAL  --  0.2   ALKPHOS  --  55   ALT  --  41   AST  --  15     CT ABDOMEN PELVIS W CONTRAST (4/26/2021)  1.  No evidence for active colitis.  2.  Colonic diverticulosis without evidence for diverticulitis.

## 2021-04-27 NOTE — PROGRESS NOTES
[facility]    Progress Note - [facility]        Date of Admission:  4/26/2021    Assessment & Plan       Doretha Fernandez is a 46 year old female admitted on 4/26/2021. She has a history of metastatic melanoma of unknown primary, seronegative rheumatoid arthritis (on prednisone and tocilizumab), steroid-induced diabetes, C.difficile infection, prior hx of nivolumab-induced colitis in 2018 who is admitted for ulcerative colitis flare.    Plans for Today:  - Proctofoam BID ordered by GI for tenesmus  - Advance diet as tolerated  - Will need to monitor & record stool frequency and % blood  - Continue methylprednisolone 32 mg BID IV     IBD flare (ulcerative colitis)  C.difficile colitis  History of nivolumab-induced colitis  Colonic diverticulosis  Patient has a history of colitis after treatment with nivolumab for melanoma in 2018 which was refractory to steroid treatment but improved with infliximab and vedolizumab. Now developed 1 month history of abdominal pain, diarrhea with BRBPR. Tested positive for C.difficile 4/5/2021 and started on PO vancomycin 4/7/2021 with minimal improvement. Had flexible sigmoidoscopy (4/22/2021) showed diffuse inflammation with biopsy result consistent with active chronic colitis, thought to be diagnostic for ulcerative colitis. C.difficile on admission negative.  - Continue vancomycin PO taper, currently on 125 mg daily per last prescription  - Follow enteric stool panel and ova&parasite result. Fecal calprotectin.  - Follow CMV PCR  - Trend CRP, CBC, CMP  - Pain control with acetaminophen and tramadol PRN  - GI consult for management of IBD flare  - Proctofoam BID ordered by GI for tenesmus  - Advance diet as tolerated  - Will need to monitor & record stool frequency and % blood  - Continue methylprednisolone 32 mg BID IV     Seronegative RA  Trials of adalimumab and Enbrel were not successful. Has required corticosteroid therapy  for the past three years with which she has had significant difficulty in tapering. Does not appears to have acute flare on presentation.  - Hold PTA prednisone  - Patient due for Tocilizumab (last dose 4/11), will hold-off for now with steroid treatment. Consider discuss with rheumatology.     Steroid-induced DM  On chronic steroid treatment for ICI-induced colitis and seronegative RA. Anticipate hyperglycemia now that high-dose steroid initiated for treatment of IBD flare.  - BS q4h, target 140-180 mg/dL. Hypoglycemia protocol.  - Low sliding scale insulin while NPO  - Hold PTA metformin 1000 mg daily     SANDRA/MDD  -Continue PTA Venlafaxine.     STORMY  Morbid obesity  - CPAP home setting     RLS:   - Continue with PTA Gabapentin 900 mg QID     Metastatic melanoma  Diagnosed on lymph node biopsy in Oct 2017, unclear primary. Treated with nivolumab and developed nivolumab-induced colitis in May 2018 treated as above. Now in complete remission and received no treatment for the past 2 years.     Diet: Full Liquid Diet    Fluids: PO  Lines: PIV  DVT Prophylaxis: Enoxaparin (Lovenox) SQ  Major Catheter: not present  Code Status: Full Code           Disposition Plan   Expected discharge: 2 - 3 days, recommended to prior living arrangement once colitis therapy plan established and symptoms improved.  Entered: Krishna Sepulveda DO 04/27/2021, 3:20 PM       The patient's care was discussed with the Attending Physician, Dr. Peralta.    Krishna Sepulveda DO  [address]  Please see sign in/sign out for up to date coverage information  ______________________________________________________________________    Interval History   No acute events overnight since admission.   Patient did have a significant migraine this morning which she attributes to not having had caffeine this morning.     Data reviewed today: I reviewed all medications, new labs and imaging  results over the last 24 hours.     Physical Exam   Vital Signs: Temp: 98.8  F (37.1  C) Temp src: Oral BP: 122/71 Pulse: 88   Resp: 18 SpO2: 94 % O2 Device: None (Room air)    Weight: 247 lbs 0 oz  General Appearance: Awake, alert, NAD but appears uncomfortable  Respiratory: CTA b/l  Cardiovascular:  RRR no murmur  GI: Obese, soft, minimally tender diffusely, but particularly over left abdomen. +BS  Skin: No suspicious rashes  Neuro: No focal neurological deficits noted     Data   Recent Labs   Lab 04/27/21  0923 04/26/21  1852 04/26/21  1835   WBC 15.5*  --  14.5*   HGB 12.9 12.6 12.3   MCV 94  --  93     --  291    140 138   POTASSIUM 4.0 3.9 3.9   CHLORIDE 107  --  106   CO2 25  --  26   BUN 10  --  11   CR 0.66  --  0.71   ANIONGAP 7  --  5   PATRICIA 9.2  --  9.5   * 115* 111*   ALBUMIN 3.4  --  3.8   PROTTOTAL 7.0  --  7.2   BILITOTAL 0.3  --  0.2   ALKPHOS 57  --  55   ALT 35  --  41   AST 11  --  15     Recent Results (from the past 24 hour(s))   CT Abdomen Pelvis w Contrast    Narrative    EXAMINATION: CT ABDOMEN PELVIS W CONTRAST, 4/26/2021 9:07 PM    TECHNIQUE:  Helical CT images from the lung bases through the  symphysis pubis were obtained with contrast.  Coronal reformatted  images were generated at a workstation for further assessment.    CONTRAST:  135 cc Isovue 370 IV.    COMPARISON: CT 4/13/2021    HISTORY: recent c diff infection, brand new diagnosis of ulcerative  colitis after flex sig done by [facility], severe L sided abd pain with  bloody diarrhea, eval for colitis/megacolon/intraabdominal abscess    FINDINGS:    Abdomen and pelvis:   Liver: No suspicious liver lesions. Portal veins appear patent.  Gallbladder: No gallstones. No evidence of acute cholecystitis.  Spleen: Normal size.  Pancreas: Near-complete fatty atrophy.  Adrenal glands: No adrenal nodules.  Kidneys: No kidney masses. No hydronephrosis or obstructing renal  stones.  Bladder / Pelvic organs: Prominent bilateral  ovaries. Lateral aspect  of the right ovary is either heterogeneously enhancing or a  hemorrhagic cyst. Unchanged in size since prior studies.  Bowel: Colonic diverticulosis. The sigmoid colon is redundant. No  evidence for acute inflammatory changes. No bowel wall thickening. The  appendix is not identified, however there are no inflammatory changes  of the right lower quadrant. The small and large bowel are normal in  caliber.  Lymph nodes: No retroperitoneal, mesenteric, or pelvic  lymphadenopathy.  Fluid: No free fluid within the abdomen.  Vessels: No infrarenal aortic aneurysm.     Lung bases: Bibasilar atelectasis. The heart is not enlarged.    Bones and soft tissues: Mild degenerative changes of the spine. No  suspicious osseous lesions. Small fat-containing periumbilical hernia.      Impression    IMPRESSION:   1.  No evidence for active colitis.  2.  Colonic diverticulosis without evidence for diverticulitis.    I have personally reviewed the examination and initial interpretation  and I agree with the findings.    ASIA ARAGON MD     Medications       calcium carbonate 600 mg-vitamin D 400 units  1 tablet Oral QAM AC     enoxaparin ANTICOAGULANT  40 mg Subcutaneous Q24H     ferrous fumarate 65 mg ([place]. FE)-Vitamin C 125 mg  1 tablet Oral Daily     gabapentin  900 mg Oral 4x Daily     hydrocortisone  1 applicator Rectal BID     insulin aspart  1-3 Units Subcutaneous TID AC     insulin aspart  1-3 Units Subcutaneous At Bedtime     methylPREDNISolone  32 mg Intravenous Q12H     [Held by provider] predniSONE  5 mg Oral Daily     rosuvastatin  10 mg Oral Daily     sodium chloride (PF)  3 mL Intracatheter Q8H     triamcinolone   Mouth/Throat BID     [START ON 5/3/2021] vancomycin  125 mg Oral Every Other Day     vancomycin  125 mg Oral Daily     venlafaxine  225 mg Oral Daily with breakfast     Vitamin D3  25 mcg Oral Daily

## 2021-04-27 NOTE — PROGRESS NOTES
**Brief GI Note**    Pt with new diagnosis of UC on flex sig at [place]. Has hx of recent C diff on taper as well as RA on tocilizumab and 5 mg oral prednisone.    Plan:  -inpatient team, please use Ulcerative Colitis Order Set  -briefly, start 32 mg IV solumedrol BID  -trend CRP, CBC, CMP  -DVT prophylaxis  -stool O&P, enteric path panel, C diff  -continue oral vancomycin at current taper dose  -please follow rest of order set for Ulcerative Colitis    GI to see in AM.    Stephanie Hauser MD PhD   Gastroenterology Fellow

## 2021-04-28 LAB
ALBUMIN SERPL-MCNC: 3.5 G/DL (ref 3.4–5)
ALP SERPL-CCNC: 48 U/L (ref 40–150)
ALT SERPL W P-5'-P-CCNC: 37 U/L (ref 0–50)
ANION GAP SERPL CALCULATED.3IONS-SCNC: 6 MMOL/L (ref 3–14)
AST SERPL W P-5'-P-CCNC: 12 U/L (ref 0–45)
BILIRUB SERPL-MCNC: 0.2 MG/DL (ref 0.2–1.3)
BUN SERPL-MCNC: 16 MG/DL (ref 7–30)
CALCIUM SERPL-MCNC: 8.9 MG/DL (ref 8.5–10.1)
CALPROTECTIN STL-MCNT: 134 MG/KG (ref 0–49.9)
CHLORIDE SERPL-SCNC: 106 MMOL/L (ref 94–109)
CO2 SERPL-SCNC: 24 MMOL/L (ref 20–32)
CREAT SERPL-MCNC: 0.74 MG/DL (ref 0.52–1.04)
CRP SERPL-MCNC: 11 MG/L (ref 0–8)
ERYTHROCYTE [DISTWIDTH] IN BLOOD BY AUTOMATED COUNT: 12.7 % (ref 10–15)
ERYTHROCYTE [SEDIMENTATION RATE] IN BLOOD BY WESTERGREN METHOD: 11 MM/H (ref 0–20)
GFR SERPL CREATININE-BSD FRML MDRD: >90 ML/MIN/{1.73_M2}
GLUCOSE BLDC GLUCOMTR-MCNC: 129 MG/DL (ref 70–99)
GLUCOSE BLDC GLUCOMTR-MCNC: 174 MG/DL (ref 70–99)
GLUCOSE BLDC GLUCOMTR-MCNC: 174 MG/DL (ref 70–99)
GLUCOSE BLDC GLUCOMTR-MCNC: 176 MG/DL (ref 70–99)
GLUCOSE BLDC GLUCOMTR-MCNC: 212 MG/DL (ref 70–99)
GLUCOSE SERPL-MCNC: 286 MG/DL (ref 70–99)
HBV CORE AB SERPL QL IA: NONREACTIVE
HBV SURFACE AB SERPL IA-ACNC: 0.1 M[IU]/ML
HBV SURFACE AG SERPL QL IA: NONREACTIVE
HCT VFR BLD AUTO: 36.4 % (ref 35–47)
HGB BLD-MCNC: 11.7 G/DL (ref 11.7–15.7)
MCH RBC QN AUTO: 30 PG (ref 26.5–33)
MCHC RBC AUTO-ENTMCNC: 32.1 G/DL (ref 31.5–36.5)
MCV RBC AUTO: 93 FL (ref 78–100)
PLATELET # BLD AUTO: 293 10E9/L (ref 150–450)
POTASSIUM SERPL-SCNC: 3.9 MMOL/L (ref 3.4–5.3)
PROT SERPL-MCNC: 6.8 G/DL (ref 6.8–8.8)
RBC # BLD AUTO: 3.9 10E12/L (ref 3.8–5.2)
SODIUM SERPL-SCNC: 136 MMOL/L (ref 133–144)
WBC # BLD AUTO: 15.7 10E9/L (ref 4–11)

## 2021-04-28 PROCEDURE — 36415 COLL VENOUS BLD VENIPUNCTURE: CPT | Performed by: STUDENT IN AN ORGANIZED HEALTH CARE EDUCATION/TRAINING PROGRAM

## 2021-04-28 PROCEDURE — 85652 RBC SED RATE AUTOMATED: CPT | Performed by: STUDENT IN AN ORGANIZED HEALTH CARE EDUCATION/TRAINING PROGRAM

## 2021-04-28 PROCEDURE — 120N000002 HC R&B MED SURG/OB UMMC

## 2021-04-28 PROCEDURE — 250N000013 HC RX MED GY IP 250 OP 250 PS 637: Performed by: STUDENT IN AN ORGANIZED HEALTH CARE EDUCATION/TRAINING PROGRAM

## 2021-04-28 PROCEDURE — 86704 HEP B CORE ANTIBODY TOTAL: CPT | Performed by: STUDENT IN AN ORGANIZED HEALTH CARE EDUCATION/TRAINING PROGRAM

## 2021-04-28 PROCEDURE — 250N000013 HC RX MED GY IP 250 OP 250 PS 637: Performed by: INTERNAL MEDICINE

## 2021-04-28 PROCEDURE — 85027 COMPLETE CBC AUTOMATED: CPT | Performed by: STUDENT IN AN ORGANIZED HEALTH CARE EDUCATION/TRAINING PROGRAM

## 2021-04-28 PROCEDURE — 250N000011 HC RX IP 250 OP 636: Performed by: STUDENT IN AN ORGANIZED HEALTH CARE EDUCATION/TRAINING PROGRAM

## 2021-04-28 PROCEDURE — 250N000012 HC RX MED GY IP 250 OP 636 PS 637: Performed by: STUDENT IN AN ORGANIZED HEALTH CARE EDUCATION/TRAINING PROGRAM

## 2021-04-28 PROCEDURE — 99233 SBSQ HOSP IP/OBS HIGH 50: CPT | Mod: GC | Performed by: INTERNAL MEDICINE

## 2021-04-28 PROCEDURE — 86706 HEP B SURFACE ANTIBODY: CPT | Performed by: STUDENT IN AN ORGANIZED HEALTH CARE EDUCATION/TRAINING PROGRAM

## 2021-04-28 PROCEDURE — 87340 HEPATITIS B SURFACE AG IA: CPT | Performed by: STUDENT IN AN ORGANIZED HEALTH CARE EDUCATION/TRAINING PROGRAM

## 2021-04-28 PROCEDURE — 99232 SBSQ HOSP IP/OBS MODERATE 35: CPT | Performed by: NURSE PRACTITIONER

## 2021-04-28 PROCEDURE — 999N001017 HC STATISTIC GLUCOSE BY METER IP

## 2021-04-28 PROCEDURE — 80053 COMPREHEN METABOLIC PANEL: CPT | Performed by: STUDENT IN AN ORGANIZED HEALTH CARE EDUCATION/TRAINING PROGRAM

## 2021-04-28 PROCEDURE — 86140 C-REACTIVE PROTEIN: CPT | Performed by: STUDENT IN AN ORGANIZED HEALTH CARE EDUCATION/TRAINING PROGRAM

## 2021-04-28 RX ORDER — DICYCLOMINE HCL 20 MG
20 TABLET ORAL
Status: DISCONTINUED | OUTPATIENT
Start: 2021-04-28 | End: 2021-05-03 | Stop reason: HOSPADM

## 2021-04-28 RX ADMIN — VANCOMYCIN HYDROCHLORIDE 125 MG: 125 CAPSULE ORAL at 08:10

## 2021-04-28 RX ADMIN — ENOXAPARIN SODIUM 40 MG: 100 INJECTION SUBCUTANEOUS at 08:10

## 2021-04-28 RX ADMIN — METHYLPREDNISOLONE SODIUM SUCCINATE 32 MG: 40 INJECTION, POWDER, LYOPHILIZED, FOR SOLUTION INTRAMUSCULAR; INTRAVENOUS at 08:11

## 2021-04-28 RX ADMIN — DICYCLOMINE HYDROCHLORIDE 20 MG: 20 TABLET ORAL at 17:26

## 2021-04-28 RX ADMIN — CALCIUM CARBONATE 600 MG (1,500 MG)-VITAMIN D3 400 UNIT TABLET 1 TABLET: at 08:10

## 2021-04-28 RX ADMIN — METHYLPREDNISOLONE SODIUM SUCCINATE 32 MG: 40 INJECTION, POWDER, LYOPHILIZED, FOR SOLUTION INTRAMUSCULAR; INTRAVENOUS at 20:18

## 2021-04-28 RX ADMIN — TRAMADOL HYDROCHLORIDE 50 MG: 50 TABLET ORAL at 15:33

## 2021-04-28 RX ADMIN — HYDROXYZINE HYDROCHLORIDE 25 MG: 25 TABLET, FILM COATED ORAL at 23:01

## 2021-04-28 RX ADMIN — GABAPENTIN 900 MG: 300 CAPSULE ORAL at 08:09

## 2021-04-28 RX ADMIN — Medication 1 TABLET: at 08:11

## 2021-04-28 RX ADMIN — GABAPENTIN 900 MG: 300 CAPSULE ORAL at 20:18

## 2021-04-28 RX ADMIN — TRIAMCINOLONE ACETONIDE: 1 PASTE DENTAL at 08:14

## 2021-04-28 RX ADMIN — ROSUVASTATIN CALCIUM 10 MG: 10 TABLET, FILM COATED ORAL at 08:10

## 2021-04-28 RX ADMIN — Medication 5 MG: at 23:30

## 2021-04-28 RX ADMIN — TRAMADOL HYDROCHLORIDE 50 MG: 50 TABLET ORAL at 21:40

## 2021-04-28 RX ADMIN — GABAPENTIN 900 MG: 300 CAPSULE ORAL at 12:24

## 2021-04-28 RX ADMIN — Medication 25 MCG: at 08:10

## 2021-04-28 RX ADMIN — VENLAFAXINE HYDROCHLORIDE 225 MG: 225 TABLET, EXTENDED RELEASE ORAL at 08:10

## 2021-04-28 RX ADMIN — PRAMOXINE HYDROCHLORIDE HYDROCORTISONE ACETATE 1 APPLICATORFUL: 100; 100 AEROSOL, FOAM TOPICAL at 00:21

## 2021-04-28 RX ADMIN — PRAMOXINE HYDROCHLORIDE HYDROCORTISONE ACETATE 1 APPLICATORFUL: 100; 100 AEROSOL, FOAM TOPICAL at 20:18

## 2021-04-28 RX ADMIN — ACETAMINOPHEN 975 MG: 325 TABLET, FILM COATED ORAL at 21:40

## 2021-04-28 RX ADMIN — GABAPENTIN 900 MG: 300 CAPSULE ORAL at 17:26

## 2021-04-28 RX ADMIN — INSULIN ASPART 1 UNITS: 100 INJECTION, SOLUTION INTRAVENOUS; SUBCUTANEOUS at 18:44

## 2021-04-28 ASSESSMENT — ACTIVITIES OF DAILY LIVING (ADL)
ADLS_ACUITY_SCORE: 13

## 2021-04-28 NOTE — PLAN OF CARE
Time: 6394-9717    Reason for admission: UC flare  Vitals: VSS on RA  Activity: Ind in room  Pain: C/O abdominal pain, PRN tramadol given x1  Neuro: A&Ox4, frustrated/tearful at times.    Cardiac: WDL  Respiratory: WDL  GI/: voiding spontaneously, multiple soft bloody BM today (MD aware)  Diet: regular diet, good appetite  Lines: PIV SL  Skin: intact    New this shift: father visited at bedside, supportive.  Requested to postpone proctofoam until after dinner.    Plan: Will continue to monitor and follow POC

## 2021-04-28 NOTE — PROGRESS NOTES
[facility]    Progress Note - [facility] Service        Date of Admission:  4/26/2021    Assessment & Plan       Doretha Fernandez is a 46 year old female admitted on 4/26/2021. She has a history of metastatic melanoma of unknown primary, seronegative rheumatoid arthritis (on prednisone and tocilizumab), steroid-induced diabetes, C.difficile infection, prior hx of nivolumab-induced colitis in 2018 who is admitted for ulcerative colitis flare.    Plans for Today:  - Continue to monitor and record stool frequency and % blood  - Continue methylprednisolone 32 mg BID IV  - Start Bentyl 20 mg TID     IBD flare (ulcerative colitis)  C.difficile colitis  History of nivolumab-induced colitis  Colonic diverticulosis  Patient has a history of colitis after treatment with nivolumab for melanoma in 2018 which was refractory to steroid treatment but improved with infliximab and vedolizumab. Now developed 1 month history of abdominal pain, diarrhea with BRBPR. Tested positive for C.difficile 4/5/2021 and started on PO vancomycin 4/7/2021 with minimal improvement. Had flexible sigmoidoscopy (4/22/2021) showed diffuse inflammation with biopsy result consistent with active chronic colitis, thought to be diagnostic for ulcerative colitis. C.difficile on admission negative.  - Continue vancomycin PO taper, currently on 125 mg daily per last prescription  - Follow enteric stool panel and ova&parasite result. Fecal calprotectin.  - Follow CMV PCR  - Trend CRP, CBC, CMP  - Pain control with acetaminophen and tramadol PRN  - GI consult for management of IBD flare  - Proctofoam BID ordered by GI for tenesmus  - Advance diet as tolerated  - Quant Gold and Hep B serologies pending before possible initiation of biologic agent  - Will need to monitor & record stool frequency and % blood  - Continue methylprednisolone 32 mg BID IV   - Start Bentyl 20 mg     Seronegative RA  Trials of adalimumab and  Enbrel were not successful. Has required corticosteroid therapy for the past three years with which she has had significant difficulty in tapering. Does not appears to have acute flare on presentation.  - Hold PTA prednisone  - Patient due for Tocilizumab (last dose 4/11), will hold-off for now with steroid treatment. Consider discuss with rheumatology.     Steroid-induced DM  On chronic steroid treatment for ICI-induced colitis and seronegative RA. Anticipate hyperglycemia now that high-dose steroid initiated for treatment of IBD flare.  - BS q4h, target 140-180 mg/dL. Hypoglycemia protocol.  - Low sliding scale insulin while NPO  - Hold PTA metformin 1000 mg daily     SANDRA/MDD  -Continue PTA Venlafaxine.     STORMY  Morbid obesity  - CPAP home setting     RLS:   - Continue with PTA Gabapentin 900 mg QID     Metastatic melanoma  Diagnosed on lymph node biopsy in Oct 2017, unclear primary. Treated with nivolumab and developed nivolumab-induced colitis in May 2018 treated as above. Now in complete remission and received no treatment for the past 2 years.     Diet: Regular Diet Adult    Fluids: PO  Lines: PIV  DVT Prophylaxis: Enoxaparin (Lovenox) SQ  Major Catheter: not present  Code Status: Full Code           Disposition Plan   Expected discharge: 2 - 3 days, recommended to prior living arrangement once colitis therapy plan established and symptoms improved.  Entered: Krishna Sepulveda DO 04/28/2021, 9:50 AM       The patient's care was discussed with the Attending Physician, Dr. Peralta.    Krishna Sepulveda DO  [address]  Please see sign in/sign out for up to date coverage information  ______________________________________________________________________    Interval History   No acute events overnight since admission.   Patient has continued to have bloody bowel movements.  She enjoyed talking with Dr. Gonzalez and Dr. Peralta in the am,  although was tearful after initial discussions with GI because she is fearful of colectomy and understandably frustrated by all that she has gone through.      Data reviewed today: I reviewed all medications, new labs and imaging results over the last 24 hours.     Physical Exam   Vital Signs: Temp: 97  F (36.1  C) Temp src: Oral BP: 139/73 Pulse: 77   Resp: 20 SpO2: 92 % O2 Device: None (Room air)    Weight: 248 lbs 3.2 oz  General Appearance: Awake, alert, NAD but appears uncomfortable  Respiratory: CTA b/l  Cardiovascular:  RRR no murmur  GI: Obese, soft, minimally tender diffusely, but particularly over left abdomen. +BS  Skin: No suspicious rashes  Neuro: No focal neurological deficits noted     Data   Recent Labs   Lab 04/27/21  0923 04/26/21  1852 04/26/21  1835   WBC 15.5*  --  14.5*   HGB 12.9 12.6 12.3   MCV 94  --  93     --  291    140 138   POTASSIUM 4.0 3.9 3.9   CHLORIDE 107  --  106   CO2 25  --  26   BUN 10  --  11   CR 0.66  --  0.71   ANIONGAP 7  --  5   PATRICIA 9.2  --  9.5   * 115* 111*   ALBUMIN 3.4  --  3.8   PROTTOTAL 7.0  --  7.2   BILITOTAL 0.3  --  0.2   ALKPHOS 57  --  55   ALT 35  --  41   AST 11  --  15     No results found for this or any previous visit (from the past 24 hour(s)).  Medications       calcium carbonate 600 mg-vitamin D 400 units  1 tablet Oral QAM AC     enoxaparin ANTICOAGULANT  40 mg Subcutaneous Q24H     ferrous fumarate 65 mg ([place]. FE)-Vitamin C 125 mg  1 tablet Oral Daily     gabapentin  900 mg Oral 4x Daily     hydrocortisone-pramoxine  1 Applicatorful Rectal BID     insulin aspart  1-3 Units Subcutaneous TID AC     insulin aspart  1-3 Units Subcutaneous At Bedtime     methylPREDNISolone  32 mg Intravenous Q12H     [Held by provider] predniSONE  5 mg Oral Daily     rosuvastatin  10 mg Oral Daily     sodium chloride (PF)  3 mL Intracatheter Q8H     triamcinolone   Mouth/Throat BID     [START ON 5/3/2021] vancomycin  125 mg Oral Every Other Day      vancomycin  125 mg Oral Daily     venlafaxine  225 mg Oral Daily with breakfast     Vitamin D3  25 mcg Oral Daily

## 2021-04-28 NOTE — PROGRESS NOTES
GASTROENTEROLOGY CONSULTATION      Date of Admission:  4/26/2021          ASSESSMENT AND RECOMMENDATIONS:   Assessment:  45 yo F with significant PMH of h/o nivolumab induced colitis treated with Remicade and Entyvio now in remission, h/o giardia infection, h/o C.diff infection 8 years ago, RA on prednisone & tocilizumab, recently strep throat treated with penicillin c/b C.diff and started PO Vancomycin 4/7 without improvement, now with diarrhea, bloody stools, and new diagnosis of UC (proctosigmoiditis)      #Ulcerative proctosigmoiditis  # H/o checkpoint inhibitor induced colitis s/p Remicade  # RA on tocilizumab & Prednisone  #Tenesmus, bloody diarrhea  Continuing to monitor progress with 72 hr IV steroids. Still experiencing 10+ bloody stools and significant tenesmus. Will add bentyl to try and address rectal symptoms.    Recommendations  -Proctofoam BID to help with tenesmus (rectal pain, fecal urgency)  -Bentyl 20 mg TID  -Track stool frequency and % with blood  -Daily CRP, hgb  -Quant gold and hepatitis B serologies in preparation for further biologic treatment  -Continue BID 32 mg Solumedrol IV    GI will continue to follow. Thank you for involving us in this patient's care. Please do not hesitate to contact the GI service with any questions or concerns.     Pt care plan discussed with Dr. Boudreaux, GI staff physician.    AMADA Bustillo CNP  Text page  -------------------------------------------------------------------------------------------------------------------          Chief Complaint:   We were asked by Dr. Guerrero of medicine to evaluate this patient with bloody stools and new UC diagnosis    History is obtained from the patient and the medical record.          History of Present Illness:   10+ loose stools again past 24 hours, all with blood. Eating, but feeling like food moves right through her. Endorses fatigue. No nausea, vomiting.        Past Medical History:   Reviewed and edited as  appropriate  Past Medical History:   Diagnosis Date     Abnormal MRI     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      Anxiety      Basal cell carcinoma      Cervical high risk HPV (human papillomavirus) test positive 2019    See problem list     Colitis      Depression      Diabetes mellitus, iatrogenic (H) 2020     Inflammatory arthritis      Insomnia      Intestinal giardiasis 3/5/2018     Lumbago     left lower back pain     Lymphedema      Malignant melanoma (H)      Melanoma (H) 10/23/2017     Mild persistent asthma      Obesity      STORMY (obstructive sleep apnea)      Prothrombin deficiency (H)     takes 81mg asa daily     Stroke (cerebrum) (H)     During      TIA (transient ischemic attack)      Type 2 diabetes mellitus (H)             Past Surgical History:   Reviewed and edited as appropriate   Past Surgical History:   Procedure Laterality Date     APPENDECTOMY       COLONOSCOPY N/A 10/18/2017    Procedure: COLONOSCOPY;  Colon;  Surgeon: Debbie Stephens MD;  [facility]     COLONOSCOPY N/A 3/9/2018    Procedure: COMBINED COLONOSCOPY, SINGLE OR MULTIPLE BIOPSY/POLYPECTOMY BY BIOPSY;  colon;  Surgeon: Benita Schumacher MD;  Location: [facility]     COLONOSCOPY      multiple since  to present - about 6 total     DISSECT LYMPH NODE AXILLA Left 10/23/2017    Procedure: DISSECT LYMPH NODE AXILLA;  Left Axillary Lymph Node Dissection ;  Surgeon: Laurent Cool MD;  Location: [facility] OR     EXAM UNDER ANESTHESIA PELVIC N/A 2020    Procedure: EXAM UNDER ANESTHESIA, PELVIS; with Cervical Biopsies, Vaginal Biopsy and Endocervical Curettings;  Surgeon: Melina Jung MD;  Location: [facility] OR     GYN SURGERY  ,          REPAIR MOHS Left 2017    Procedure: REPAIR MOHS;  Left Upper Lid Moh's Reconstruction;  Surgeon: Kisha Bosch MD;  [facility]            Previous Endoscopy:     21 flex    Diffuse        moderate inflammation characterized  by congestion (edema), erythema and        shallow ulcerations was found in the rectum and in the sigmoid colon.        Biopsies were taken with a cold forceps for histology. Verification of        patient identification for the specimen was done.      Results for orders placed or performed during the hospital encounter of 03/08/18   COLONOSCOPY   Result Value Ref Range    COLONOSCOPY       [facility], [place]  [address] [place] [address] [phone]     Endoscopy Department  _______________________________________________________________________________  Patient Name: Doretha Fernandez            Procedure Date: 3/9/2018 10:30 AM  MRN: [id number]                       Account Number: [id number]  YOB: 1975              Admit Type: Inpatient  Age: 43                                Gender: Female  Note Status: Finalized                Attending MD: Benita Schumacher MD  Pause for the Cause: performed.       Total Sedation Time: 20 minutes of   continuous 1:1 bedside monitoring performed.  _______________________________________________________________________________     Procedure:           Colonoscopy  Indications:         Hematochezia, 44 yo F wtih hematochezia and diarrhea                        with stool + giardia, currently on Opdivo for metastatic                        melanoma. CT showed left sided colitis.  Providers:           Pj Schumacher MD, Renard Cosme, RN  Referring MD:          Medicines:           Midazolam 1.5 mg IV, Meperidine 25 mg IV,                        Diphenhydramine 25 mg IV  Complications:       No immediate complications.  _______________________________________________________________________________  Procedure:           Pre-Anesthesia Assessment:                       - Prior to the procedure, a History and Physical was                        performed, and patient medications and allergies were                        reviewed. The patient is  competent. The risks and                        benefits of the procedure and the sedation options and                        risks were discussed with the patient. All questions                        were answered and informed consent was obtained. Patient                        identification and proposed procedure were verified by                        the physician and the nurse in the pre-procedure area in                        the procedure  room. Mental Status Examination: alert and                        oriented. Airway Examination: normal oropharyngeal                        airway and neck mobility. Respiratory Examination: clear                        to auscultation. CV Examination: normal. Prophylactic                        Antibiotics: The patient does not require prophylactic                        antibiotics. Prior Anticoagulants: The patient has taken                        no previous anticoagulant or antiplatelet agents. ASA                        Grade Assessment: II - A patient with mild systemic                        disease. After reviewing the risks and benefits, the                        patient was deemed in satisfactory condition to undergo                        the procedure. The anesthesia plan was to use moderate                        sedation / analgesia (conscious sedation). Immediately                        prior to administration of medications, the patient was                        re- assessed for adequacy to receive sedatives. The heart                        rate, respiratory rate, oxygen saturations, blood                        pressure, adequacy of pulmonary ventilation, and                        response to care were monitored throughout the                        procedure. The physical status of the patient was                        re-assessed after the procedure.                       After obtaining informed consent, the colonoscope was                         passed under direct vision. Throughout the procedure,                        the patient's blood pressure, pulse, and oxygen                        saturations were monitored continuously. The pediatric                        colonoscope was introduced through the anus and advanced                        to the transverse colon for evaluation. This was the                        intended extent. Due to poor prep and patient                        discomfort, scope was not advanced further. The                         colonoscopy was performed without difficulty. The                        patient tolerated the procedure well. The quality of the                        bowel preparation was poor, with solid stool throughout                        the exam.                                                                                   Findings:       The perianal and digital rectal examinations were normal.       A diffuse area of moderately congested, erythematous, granular and        vascular-pattern-decreased mucosa was found in the rectum, in the        recto-sigmoid colon, in the sigmoid colon, in the descending colon and        in the transverse colon. There was overlying exudate that could be        washed off. This was most severe in the rectosigmoid area, which        contained patchy areas of petechiae. Biopsies were taken with a cold        forceps for histology (rushed).                                                                                   Impression:           - Preparation of the colon was poor.                       - Congested, erythematous, granular and                        vascular-pattern-decreased mucosa in the rectum, in the                        recto-sigmoid colon, in the sigmoid colon, in the                        descending colon and in the transverse colon. Biopsied.  Recommendation:      - Return patient to hospital [facility] for ongoing care.                       - Resume  previous diet.                       - Continue present medications.                       - Await pathology results (rushed).                                                                                     Electronically signed by: Benita Schumacher MD  __________________  Benita Schumacher MD  3/9/2018 12:14:51 PM  I was physically present for the entire viewing portion of the exam.  __________________________  Signature of teaching physician  Gauri/Dominik Schumacher MD  Number of Addenda: 0    Note Initiated On: 3/9/2018 8:16 AM              Social History:   Reviewed and edited as appropriate  Social History     Socioeconomic History     Marital status:      Spouse name: Not on file     Number of children: Not on file     Years of education: Not on file     Highest education level: Not on file   Occupational History     Not on file   Social Needs     Financial resource strain: Not on file     Food insecurity     Worry: Not on file     Inability: Not on file     Transportation needs     Medical: Not on file     Non-medical: Not on file   Tobacco Use     Smoking status: Former Smoker     Packs/day: 1.00     Years: 5.00     Pack years: 5.00     Quit date: 3/20/1998     Years since quittin.1     Smokeless tobacco: Never Used   Substance and Sexual Activity     Alcohol use: Yes     Comment: occ     Drug use: No     Sexual activity: Not Currently     Partners: Male     Birth control/protection: Surgical   Lifestyle     Physical activity     Days per week: Not on file     Minutes per session: Not on file     Stress: Not on file   Relationships     Social connections     Talks on phone: Not on file     Gets together: Not on file     Attends Tenriism service: Not on file     Active member of club or organization: Not on file     Attends meetings of clubs or organizations: Not on file     Relationship status: Not on file     Intimate partner violence     Fear of current or ex partner: Not on file      Emotionally abused: Not on file     Physically abused: Not on file     Forced sexual activity: Not on file   Other Topics Concern     Parent/sibling w/ CABG, MI or angioplasty before 65F 55M? No   Social History Narrative    19 y.o- patient's mother   of lung cancer. She had to take care of her younger sister.    2012- patient's  had a heart attack with stents placed, followed by cardiac rehabilitation    2000 TO 2012  was in [facility] for depression    2013 patient's  went through alcohol rehabilitation at [facility] inpatient            They have attended couple counseling a couple of times and patient went to the family program for chemical dependency.    Patient denies alcohol or drug use and herself            Has 2 children, girls ages 10 and 13     For a while she was working 3 jobs since her  was ill. 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 Reports her job is very stressful.                Family History:   Reviewed and edited as appropriate  Family History   Problem Relation Age of Onset     Cancer Mother 45        lung     Neurologic Disorder Mother         epilepsy     Lipids Father      Gastrointestinal Disease Father         diverticulitis      Depression Father      Cancer Maternal Grandmother      Blood Disease Maternal Grandmother         lymphoma      Arthritis Maternal Grandmother      Diabetes Maternal Grandmother      Depression Maternal Grandmother      Macular Degeneration Maternal Grandmother      Glaucoma Maternal Grandmother      Diabetes Maternal Grandfather      Cerebrovascular Disease Maternal Grandfather      Blood Disease Maternal Grandfather      Heart Disease Maternal Grandfather      Glaucoma Maternal Grandfather      Cancer Paternal Grandmother      Cancer - colorectal Paternal Grandmother      Respiratory Paternal Grandfather         emphysema      Heart Disease Daughter      Asthma  Daughter      Depression Sister      Melanoma No family hx of             Allergies:   Reviewed and edited as appropriate     Allergies   Allergen Reactions     Bee Venom Swelling     Azithromycin Diarrhea     Erythromycin      Other reaction(s): GI intolerance, Vomiting     Fentanyl Other (See Comments)     sweating  sweating     Prochlorperazine Fatigue     Other reaction(s): Other (see comments)  Fatigue     Buspirone      Other reaction(s): GI intolerance  vomiting     Erythrocin Nausea and Vomiting     Zithromax [Azithromycin Dihydrate] Diarrhea     Enbrel [Etanercept] Hives and Rash            Medications:     Current Facility-Administered Medications   Medication     acetaminophen (TYLENOL) tablet 975 mg     calcium carbonate 600 mg-vitamin D 400 units (CALTRATE) per tablet 1 tablet     glucose gel 15-30 g    Or     dextrose 50 % injection 25-50 mL    Or     glucagon injection 1 mg     enoxaparin ANTICOAGULANT (LOVENOX) injection 40 mg     ferrous fumarate 65 mg ([place]. FE)-Vitamin C 125 mg (VITRON C) tablet 1 tablet     gabapentin (NEURONTIN) capsule 900 mg     hydrocortisone-pramoxine (PROCTOFOAM-HC) rectal foam 1 Applicatorful     hydrOXYzine (ATARAX) tablet 25 mg     insulin aspart (NovoLOG) injection (RAPID ACTING)     insulin aspart (NovoLOG) injection (RAPID ACTING)     lidocaine (LMX4) cream     lidocaine 1 % 1 mL     methylPREDNISolone sodium succinate (solu-MEDROL) injection 32 mg     [Held by provider] predniSONE (DELTASONE) tablet 5 mg     rosuvastatin (CRESTOR) tablet 10 mg     sodium chloride (PF) 0.9% PF flush 3 mL     sodium chloride (PF) 0.9% PF flush 3 mL     traMADol (ULTRAM) tablet 50 mg     triamcinolone (KENALOG) 0.1 % paste     [START ON 5/3/2021] vancomycin (VANCOCIN) capsule 125 mg     vancomycin (VANCOCIN) capsule 125 mg     venlafaxine (EFFEXOR-ER) 24 hr tablet 225 mg     Vitamin D3 (CHOLECALCIFEROL) tablet 25 mcg     Facility-Administered Medications Ordered in Other Encounters  "  Medication     lidocaine 1 % 9 mL     sodium bicarbonate 8.4 % injection 1 mEq             Review of Systems:     A complete review of systems was performed and is negative except as noted in the HPI           Physical Exam:   BP (!) 140/79 (BP Location: Right leg)   Pulse 78   Temp 98.4  F (36.9  C) (Oral)   Resp 16   Ht 1.6 m (5' 3\")   Wt 112.6 kg (248 lb 3.2 oz)   SpO2 94%   BMI 43.97 kg/m    Wt:   Wt Readings from Last 2 Encounters:   04/27/21 112.6 kg (248 lb 3.2 oz)   04/13/21 115.7 kg (255 lb)      Constitutional: cooperative, pleasant, not dyspneic/diaphoretic, no acute distress  Eyes: Sclera anicteric/injected  Ears/nose/mouth/throat: Normal oropharynx without ulcers or exudate, mucus membranes moist, hearing intact  Neck: supple without obvious mass  CV: No edema  Respiratory: Unlabored breathing  Lymph: No submandibular, supraclavicular or inguinal lymphadenopathy  Abd: Nondistended, +bs, no hepatosplenomegaly, L sided tenderness, no peritoneal signs  Skin: warm, perfused, no jaundice  Neuro: AAO x 3  Psych: Normal affect  MSK: No gross deformities         Data:   Labs and imaging below were independently reviewed and interpreted    BMP  Recent Labs   Lab 04/28/21  1006 04/27/21  0923 04/26/21  1852 04/26/21  1835    139 140 138   POTASSIUM 3.9 4.0 3.9 3.9   CHLORIDE 106 107  --  106   PATRICIA 8.9 9.2  --  9.5   CO2 24 25  --  26   BUN 16 10  --  11   CR 0.74 0.66  --  0.71   * 119* 115* 111*     CBC  Recent Labs   Lab 04/28/21  1006 04/27/21  0923 04/26/21  1835 04/26/21  1835   WBC 15.7* 15.5*  --  14.5*   RBC 3.90 4.12  --  3.99   HGB 11.7 12.9   < > 12.3   HCT 36.4 38.8  --  37.0   MCV 93 94  --  93   MCH 30.0 31.3  --  30.8   MCHC 32.1 33.2  --  33.2   RDW 12.7 12.7  --  12.9    289  --  291    < > = values in this interval not displayed.     INRNo lab results found in last 7 days.  LFTs  Recent Labs   Lab 04/28/21  1006 04/27/21  0923 04/26/21  1835   ALKPHOS 48 57 55   AST " 12 11 15   ALT 37 35 41   BILITOTAL 0.2 0.3 0.2   PROTTOTAL 6.8 7.0 7.2   ALBUMIN 3.5 3.4 3.8      PANCNo lab results found in last 7 days.    Imagin2021 CT ab pelvis    IMPRESSION:   1.  No evidence for active colitis.  2.  Colonic diverticulosis without evidence for diverticulitis.     I have personally reviewed the examination and initial interpretation  and I agree with the findings.     ASIA ARAGON MD

## 2021-04-28 NOTE — PROGRESS NOTES
"[id number]    Reason for admission: C. difficile colitis [A04.72]  Ulcerative colitis with complication, unspecified location (H) [K51.919]    BP (!) 147/77 (BP Location: Right leg)   Pulse 82   Temp 98.3  F (36.8  C) (Oral)   Resp 18   Ht 1.6 m (5' 3\")   Wt 112.6 kg (248 lb 3.2 oz)   SpO2 95%   BMI 43.97 kg/m      Activity: Up ad miriam  Respiratory: WDL  Cardiovascular: WDL   GI/: Voiding freely, blood/clot stool. Hyperactive BS. Abdominal fullness.   Skin: 2 RN skin assessment done with Hayde MARR. No acute skin issues found. Skin clean dry and intact. Previous history of lymphedema on left arm/leg.   Musculoskeletal: Generalized weakness.  Neurologic: WDL  Pain: Abdominal pain, HA tylenol given.  Diet: Regular  Lines/Drains: PIV SL    New this shift: Proctofoam ordered BID.     Plan: Expected discharge: 2 - 3 days, recommended to prior living arrangement once colitis therapy plan established and symptoms improved.        "

## 2021-04-28 NOTE — PROGRESS NOTES
Pt has been alert and oriented X4, VSS, up in room independently, tolerated reg diet well,  and 174 today. Pt had multiple soft/bloody stools this shift. I&O log given to pt. Hgb 11.7 this am, slightly down from 12.9 yesterday. Pt is currently on IV steroid and po Vanco. Pt has been a little sad and frustrated thru out the day but has been calm and pleasant. Able to make needs known.

## 2021-04-29 LAB
ALBUMIN SERPL-MCNC: 3.5 G/DL (ref 3.4–5)
ALP SERPL-CCNC: 52 U/L (ref 40–150)
ALT SERPL W P-5'-P-CCNC: 58 U/L (ref 0–50)
ANION GAP SERPL CALCULATED.3IONS-SCNC: 8 MMOL/L (ref 3–14)
AST SERPL W P-5'-P-CCNC: 27 U/L (ref 0–45)
BILIRUB SERPL-MCNC: 0.2 MG/DL (ref 0.2–1.3)
BUN SERPL-MCNC: 20 MG/DL (ref 7–30)
CALCIUM SERPL-MCNC: 9 MG/DL (ref 8.5–10.1)
CHLORIDE SERPL-SCNC: 107 MMOL/L (ref 94–109)
CO2 SERPL-SCNC: 25 MMOL/L (ref 20–32)
CREAT SERPL-MCNC: 0.67 MG/DL (ref 0.52–1.04)
CRP SERPL-MCNC: 4.6 MG/L (ref 0–8)
ERYTHROCYTE [SEDIMENTATION RATE] IN BLOOD BY WESTERGREN METHOD: 10 MM/H (ref 0–20)
GAMMA INTERFERON BACKGROUND BLD IA-ACNC: 0 IU/ML
GFR SERPL CREATININE-BSD FRML MDRD: >90 ML/MIN/{1.73_M2}
GLUCOSE BLDC GLUCOMTR-MCNC: 140 MG/DL (ref 70–99)
GLUCOSE BLDC GLUCOMTR-MCNC: 146 MG/DL (ref 70–99)
GLUCOSE BLDC GLUCOMTR-MCNC: 208 MG/DL (ref 70–99)
GLUCOSE BLDC GLUCOMTR-MCNC: 244 MG/DL (ref 70–99)
GLUCOSE BLDC GLUCOMTR-MCNC: 99 MG/DL (ref 70–99)
GLUCOSE SERPL-MCNC: 114 MG/DL (ref 70–99)
M TB IFN-G CD4+ BCKGRND COR BLD-ACNC: 3.66 IU/ML
M TB TUBERC IFN-G BLD QL: NEGATIVE
MITOGEN IGNF BCKGRD COR BLD-ACNC: 0 IU/ML
MITOGEN IGNF BCKGRD COR BLD-ACNC: 0 IU/ML
PLATELET # BLD AUTO: 268 10E9/L (ref 150–450)
POTASSIUM SERPL-SCNC: 4.2 MMOL/L (ref 3.4–5.3)
PROT SERPL-MCNC: 6.7 G/DL (ref 6.8–8.8)
SODIUM SERPL-SCNC: 140 MMOL/L (ref 133–144)

## 2021-04-29 PROCEDURE — 250N000013 HC RX MED GY IP 250 OP 250 PS 637: Performed by: STUDENT IN AN ORGANIZED HEALTH CARE EDUCATION/TRAINING PROGRAM

## 2021-04-29 PROCEDURE — 86140 C-REACTIVE PROTEIN: CPT | Performed by: STUDENT IN AN ORGANIZED HEALTH CARE EDUCATION/TRAINING PROGRAM

## 2021-04-29 PROCEDURE — 120N000002 HC R&B MED SURG/OB UMMC

## 2021-04-29 PROCEDURE — 999N001017 HC STATISTIC GLUCOSE BY METER IP

## 2021-04-29 PROCEDURE — 250N000011 HC RX IP 250 OP 636: Performed by: STUDENT IN AN ORGANIZED HEALTH CARE EDUCATION/TRAINING PROGRAM

## 2021-04-29 PROCEDURE — 85652 RBC SED RATE AUTOMATED: CPT | Performed by: STUDENT IN AN ORGANIZED HEALTH CARE EDUCATION/TRAINING PROGRAM

## 2021-04-29 PROCEDURE — 36415 COLL VENOUS BLD VENIPUNCTURE: CPT | Performed by: STUDENT IN AN ORGANIZED HEALTH CARE EDUCATION/TRAINING PROGRAM

## 2021-04-29 PROCEDURE — 99233 SBSQ HOSP IP/OBS HIGH 50: CPT | Mod: GC | Performed by: INTERNAL MEDICINE

## 2021-04-29 PROCEDURE — 85049 AUTOMATED PLATELET COUNT: CPT | Performed by: STUDENT IN AN ORGANIZED HEALTH CARE EDUCATION/TRAINING PROGRAM

## 2021-04-29 PROCEDURE — 99232 SBSQ HOSP IP/OBS MODERATE 35: CPT | Performed by: NURSE PRACTITIONER

## 2021-04-29 PROCEDURE — 80053 COMPREHEN METABOLIC PANEL: CPT | Performed by: STUDENT IN AN ORGANIZED HEALTH CARE EDUCATION/TRAINING PROGRAM

## 2021-04-29 RX ORDER — HYDROXYZINE HYDROCHLORIDE 25 MG/1
25 TABLET, FILM COATED ORAL
Status: DISCONTINUED | OUTPATIENT
Start: 2021-04-29 | End: 2021-05-03 | Stop reason: HOSPADM

## 2021-04-29 RX ADMIN — INSULIN ASPART 2 UNITS: 100 INJECTION, SOLUTION INTRAVENOUS; SUBCUTANEOUS at 13:52

## 2021-04-29 RX ADMIN — DICYCLOMINE HYDROCHLORIDE 20 MG: 20 TABLET ORAL at 09:20

## 2021-04-29 RX ADMIN — ROSUVASTATIN CALCIUM 10 MG: 10 TABLET, FILM COATED ORAL at 09:19

## 2021-04-29 RX ADMIN — GABAPENTIN 900 MG: 300 CAPSULE ORAL at 17:13

## 2021-04-29 RX ADMIN — METHYLPREDNISOLONE SODIUM SUCCINATE 32 MG: 40 INJECTION, POWDER, LYOPHILIZED, FOR SOLUTION INTRAMUSCULAR; INTRAVENOUS at 09:18

## 2021-04-29 RX ADMIN — PRAMOXINE HYDROCHLORIDE HYDROCORTISONE ACETATE 1 APPLICATORFUL: 100; 100 AEROSOL, FOAM TOPICAL at 09:19

## 2021-04-29 RX ADMIN — Medication 1 TABLET: at 09:20

## 2021-04-29 RX ADMIN — HYDROXYZINE HYDROCHLORIDE 25 MG: 25 TABLET, FILM COATED ORAL at 23:08

## 2021-04-29 RX ADMIN — ENOXAPARIN SODIUM 40 MG: 100 INJECTION SUBCUTANEOUS at 09:18

## 2021-04-29 RX ADMIN — CALCIUM CARBONATE 600 MG (1,500 MG)-VITAMIN D3 400 UNIT TABLET 1 TABLET: at 09:19

## 2021-04-29 RX ADMIN — INSULIN ASPART 1 UNITS: 100 INJECTION, SOLUTION INTRAVENOUS; SUBCUTANEOUS at 19:45

## 2021-04-29 RX ADMIN — DICYCLOMINE HYDROCHLORIDE 20 MG: 20 TABLET ORAL at 17:13

## 2021-04-29 RX ADMIN — DICYCLOMINE HYDROCHLORIDE 20 MG: 20 TABLET ORAL at 13:51

## 2021-04-29 RX ADMIN — ACETAMINOPHEN 975 MG: 325 TABLET, FILM COATED ORAL at 21:19

## 2021-04-29 RX ADMIN — VANCOMYCIN HYDROCHLORIDE 125 MG: 125 CAPSULE ORAL at 09:19

## 2021-04-29 RX ADMIN — PRAMOXINE HYDROCHLORIDE HYDROCORTISONE ACETATE 1 APPLICATORFUL: 100; 100 AEROSOL, FOAM TOPICAL at 21:20

## 2021-04-29 RX ADMIN — GABAPENTIN 900 MG: 300 CAPSULE ORAL at 09:20

## 2021-04-29 RX ADMIN — HYDROXYZINE HYDROCHLORIDE 25 MG: 25 TABLET, FILM COATED ORAL at 09:22

## 2021-04-29 RX ADMIN — TRAMADOL HYDROCHLORIDE 50 MG: 50 TABLET ORAL at 21:18

## 2021-04-29 RX ADMIN — GABAPENTIN 900 MG: 300 CAPSULE ORAL at 13:51

## 2021-04-29 RX ADMIN — Medication 25 MCG: at 09:23

## 2021-04-29 RX ADMIN — GABAPENTIN 900 MG: 300 CAPSULE ORAL at 21:18

## 2021-04-29 RX ADMIN — METHYLPREDNISOLONE SODIUM SUCCINATE 32 MG: 40 INJECTION, POWDER, LYOPHILIZED, FOR SOLUTION INTRAMUSCULAR; INTRAVENOUS at 21:18

## 2021-04-29 RX ADMIN — VENLAFAXINE HYDROCHLORIDE 225 MG: 225 TABLET, EXTENDED RELEASE ORAL at 09:18

## 2021-04-29 RX ADMIN — Medication 5 MG: at 23:08

## 2021-04-29 ASSESSMENT — ACTIVITIES OF DAILY LIVING (ADL)
ADLS_ACUITY_SCORE: 13

## 2021-04-29 NOTE — PROGRESS NOTES
GASTROENTEROLOGY PROGRESS NOTE          ASSESSMENT AND RECOMMENDATIONS:   Assessment:  47 yo F with significant PMH of h/o nivolumab induced colitis treated with Remicade and Entyvio now in remission, h/o giardia infection, h/o C.diff infection 8 years ago, RA on prednisone & tocilizumab, recently strep throat treated with penicillin c/b C.diff and started PO Vancomycin 4/7 without improvement, now with diarrhea, bloody stools, and new diagnosis of UC (proctosigmoiditis)      #Ulcerative proctosigmoiditis  # H/o checkpoint inhibitor induced colitis s/p Remicade  # RA on tocilizumab & Prednisone  #Tenesmus, bloody diarrhea  CRP continues to trend down. Stools also improved from 10 >> 6 daily. Only one so far today. Progress so far on >48 hours of IV steroids is encouraging. Tomorrow will reassess for continued improvement and ability to transition to PO steroids. If her progress worsens will consider salvage therapy with cyclosporine. Dr. Boudreaux has been in communication with her [place] care team, who are in agreement with Reinitiating vedolizumab in outpatient setting. She will continue to follow at [place] to keep all her GI in one healthcare system.    Recommendations  -Continue daily CRP  -Continue to document number of stools daily and the % containing blood  -Continue bentyl and proctofoam to address tenesmus     GI will continue to follow. Thank you for involving us in this patient's care. Please do not hesitate to contact the GI service with any questions or concerns.     Pt care plan discussed with Dr. Boudreaux, GI staff physician.    Yamil Diaz CNP  [facility]  Text page          Interval History:   6 bloody stools yesterday. 1 this am, along with one episode of just passing blood. Continues to try proctofoam with mildly improved fecal urgency. Good po, still feeling like food runs right through her. Denies nausea, vomiting. Endorses fatigue.         Medications:     Current Facility-Administered Medications  "  Medication     acetaminophen (TYLENOL) tablet 975 mg     calcium carbonate 600 mg-vitamin D 400 units (CALTRATE) per tablet 1 tablet     glucose gel 15-30 g    Or     dextrose 50 % injection 25-50 mL    Or     glucagon injection 1 mg     dicyclomine (BENTYL) tablet 20 mg     enoxaparin ANTICOAGULANT (LOVENOX) injection 40 mg     ferrous fumarate 65 mg ([place]. FE)-Vitamin C 125 mg (VITRON C) tablet 1 tablet     gabapentin (NEURONTIN) capsule 900 mg     hydrocortisone-pramoxine (PROCTOFOAM-HC) rectal foam 1 Applicatorful     hydrOXYzine (ATARAX) tablet 25 mg     insulin aspart (NovoLOG) injection (RAPID ACTING)     insulin aspart (NovoLOG) injection (RAPID ACTING)     lidocaine (LMX4) cream     lidocaine 1 % 1 mL     melatonin tablet 5 mg     methylPREDNISolone sodium succinate (solu-MEDROL) injection 32 mg     [Held by provider] predniSONE (DELTASONE) tablet 5 mg     rosuvastatin (CRESTOR) tablet 10 mg     sodium chloride (PF) 0.9% PF flush 3 mL     sodium chloride (PF) 0.9% PF flush 3 mL     traMADol (ULTRAM) tablet 50 mg     triamcinolone (KENALOG) 0.1 % paste     [START ON 5/3/2021] vancomycin (VANCOCIN) capsule 125 mg     vancomycin (VANCOCIN) capsule 125 mg     venlafaxine (EFFEXOR-ER) 24 hr tablet 225 mg     Vitamin D3 (CHOLECALCIFEROL) tablet 25 mcg     Facility-Administered Medications Ordered in Other Encounters   Medication     lidocaine 1 % 9 mL     sodium bicarbonate 8.4 % injection 1 mEq        Physical Exam   Blood pressure 138/55, pulse 73, temperature 97.5  F (36.4  C), temperature source Oral, resp. rate 16, height 1.6 m (5' 3\"), weight 112.6 kg (248 lb 3.2 oz), SpO2 93 %, not currently breastfeeding.  Constitutional: cooperative, pleasant, not dyspneic/diaphoretic, no acute distress  Eyes: Sclera anicteric/injected  Ears/nose/mouth/throat: Normal oropharynx without ulcers or exudate, mucus membranes moist  Neck: supple  CV: No edema  Respiratory: Unlabored breathing  Abd:  Nondistended, +bs, no " hepatosplenomegaly, mild general tenderness, no peritoneal signs  Skin: warm, perfused, no jaundice  Neuro: AAO x 3  Psych: Normal affect  MSK: No gross deformities    Data   Current Labs  CBC  Recent Labs   Lab 04/29/21 0826 04/28/21 1006 04/27/21 0923 04/26/21 1852 04/26/21  1835   WBC  --  15.7* 15.5*  --  14.5*   RBC  --  3.90 4.12  --  3.99   HGB  --  11.7 12.9 12.6 12.3   HCT  --  36.4 38.8  --  37.0   MCV  --  93 94  --  93   MCH  --  30.0 31.3  --  30.8   MCHC  --  32.1 33.2  --  33.2   RDW  --  12.7 12.7  --  12.9    293 289  --  291     BMP  Recent Labs   Lab 04/29/21 0826 04/28/21 1006 04/27/21 0923 04/26/21 1852 04/26/21  1835    136 139 140 138   POTASSIUM 4.2 3.9 4.0 3.9 3.9   CHLORIDE 107 106 107  --  106   CO2 25 24 25  --  26   ANIONGAP 8 6 7  --  5   * 286* 119* 115* 111*   BUN 20 16 10  --  11   CR 0.67 0.74 0.66  --  0.71   GFRESTIMATED >90 >90 >90  --  >90   GFRESTBLACK >90 >90 >90  --  >90   PATRICIA 9.0 8.9 9.2  --  9.5      INRNo lab results found in last 7 days.  Liver panel  Recent Labs   Lab 04/29/21 0826 04/28/21 1006 04/27/21 0923 04/26/21  1835   PROTTOTAL 6.7* 6.8 7.0 7.2   ALBUMIN 3.5 3.5 3.4 3.8   BILITOTAL 0.2 0.2 0.3 0.2   ALKPHOS 52 48 57 55   AST 27 12 11 15   ALT 58* 37 35 41       Imaging/procedures:  Reviewed in EMR

## 2021-04-29 NOTE — PLAN OF CARE
Time: [id number]     Reason for admission: UC flare  Vitals: VSS on RA  Activity: Ind in room  Pain: C/O abdominal pain  Neuro: A&Ox4    Cardiac: WDL  Respiratory: WDL  GI/: voiding spontaneously, 3 BM this shift (soft/bloody) (MD aware)  Diet: regular diet, good appetite  Lines: PIV SL  Skin: intact     New this shift: pt sleeping much of shift, reported feeling very tired.  PRN atarax given in AM for anxiety, now dose available at HS.    Plan: Will continue to monitor and follow POC

## 2021-04-29 NOTE — PROGRESS NOTES
[facility]    Progress Note - [facility] Service        Date of Admission:  4/26/2021    Assessment & Plan       Doretha Fernandez is a 46 year old female admitted on 4/26/2021. She has a history of metastatic melanoma of unknown primary, seronegative rheumatoid arthritis (on prednisone and tocilizumab), steroid-induced diabetes, C.difficile infection, prior hx of nivolumab-induced colitis in 2018 who is admitted for ulcerative colitis flare.    Plans for Today:  - Hydroxyzine 25 mg at bedtime PRN.  Will avoid during day  - CBC, CRP in am  - Continue to monitor and record stool frequency and % blood  - Continue methylprednisolone 32 mg BID IV     IBD flare (ulcerative colitis)  C.difficile colitis  History of nivolumab-induced colitis  Colonic diverticulosis  Patient has a history of colitis after treatment with nivolumab for melanoma in 2018 which was refractory to steroid treatment but improved with infliximab and vedolizumab. Now developed 1 month history of abdominal pain, diarrhea with BRBPR. Tested positive for C.difficile 4/5/2021 and started on PO vancomycin 4/7/2021 with minimal improvement. Had flexible sigmoidoscopy (4/22/2021) showed diffuse inflammation with biopsy result consistent with active chronic colitis, thought to be diagnostic for ulcerative colitis. C.difficile on admission negative.  - Continue vancomycin PO taper, currently on 125 mg daily per last prescription  - Follow enteric stool panel and ova&parasite result. Fecal calprotectin.  - Follow CMV PCR  - Trend CRP, CBC, CMP  - Pain control with acetaminophen and tramadol PRN  - GI consult for management of IBD flare  - Proctofoam BID ordered by GI for tenesmus  - Advance diet as tolerated  - Quant Gold and Hep B serologies pending before possible initiation of biologic agent  - Will need to monitor & record stool frequency and % blood  - Continue methylprednisolone 32 mg BID IV   - Start Bentyl  20 mg     Seronegative RA  Trials of adalimumab and Enbrel were not successful. Has required corticosteroid therapy for the past three years with which she has had significant difficulty in tapering. Does not appears to have acute flare on presentation.  - Hold PTA prednisone  - Patient due for Tocilizumab (last dose 4/11), will hold-off for now with steroid treatment. Consider discuss with rheumatology.     Steroid-induced DM  On chronic steroid treatment for ICI-induced colitis and seronegative RA. Anticipate hyperglycemia now that high-dose steroid initiated for treatment of IBD flare.  - BS q4h, target 140-180 mg/dL. Hypoglycemia protocol.  - Low sliding scale insulin while NPO  - Hold PTA metformin 1000 mg daily     SANDRA/MDD  -Continue PTA Venlafaxine.     STORMY  Morbid obesity  - CPAP home setting     RLS:   - Continue with PTA Gabapentin 900 mg QID     Metastatic melanoma  Diagnosed on lymph node biopsy in Oct 2017, unclear primary. Treated with nivolumab and developed nivolumab-induced colitis in May 2018 treated as above. Now in complete remission and received no treatment for the past 2 years.     Diet: Regular Diet Adult    Fluids: PO  Lines: PIV  DVT Prophylaxis: Enoxaparin (Lovenox) SQ  Major Catheter: not present  Code Status: Full Code           Disposition Plan   Expected discharge: 2 - 3 days, recommended to prior living arrangement once colitis therapy plan established and symptoms improved.  Entered: Krishna Sepulveda DO 04/29/2021, 7:46 AM       The patient's care was discussed with the Attending Physician, Dr. Fenton.    Krishna Sepulveda DO  [address]  Please see sign in/sign out for up to date coverage information  ______________________________________________________________________    Interval History   No acute events overnight since admission.   Patient has continued to have bloody bowel movements. She did not  sleep well at all last night.  Very anxious today, still poor appetite because she is so nervous.  Denies fevers, chills, significant abdominal pain, skin rash, joint pain.     Data reviewed today: I reviewed all medications, new labs and imaging results over the last 24 hours.     Physical Exam   Vital Signs: Temp: 98.3  F (36.8  C) Temp src: Oral BP: 111/63 Pulse: 69   Resp: 16 SpO2: 92 % O2 Device: None (Room air)    Weight: 248 lbs 3.2 oz     General Appearance: Awake, alert, sitting in chair at bedside, NAD but appears anxiousl and uncomfortable  Respiratory: CTA b/l  Cardiovascular:  RRR no murmur  GI: Obese, soft, minimally tender diffusely, but particularly over left abdomen. +BS  Skin: No suspicious rashes  Neuro: No focal neurological deficits noted     Data   Recent Labs   Lab 04/28/21  1006 04/27/21  0923 04/26/21  1852 04/26/21  1835   WBC 15.7* 15.5*  --  14.5*   HGB 11.7 12.9 12.6 12.3   MCV 93 94  --  93    289  --  291    139 140 138   POTASSIUM 3.9 4.0 3.9 3.9   CHLORIDE 106 107  --  106   CO2 24 25  --  26   BUN 16 10  --  11   CR 0.74 0.66  --  0.71   ANIONGAP 6 7  --  5   PATRICIA 8.9 9.2  --  9.5   * 119* 115* 111*   ALBUMIN 3.5 3.4  --  3.8   PROTTOTAL 6.8 7.0  --  7.2   BILITOTAL 0.2 0.3  --  0.2   ALKPHOS 48 57  --  55   ALT 37 35  --  41   AST 12 11  --  15     No results found for this or any previous visit (from the past 24 hour(s)).  Medications       calcium carbonate 600 mg-vitamin D 400 units  1 tablet Oral QAM AC     dicyclomine  20 mg Oral TID w/meals     enoxaparin ANTICOAGULANT  40 mg Subcutaneous Q24H     ferrous fumarate 65 mg ([place]. FE)-Vitamin C 125 mg  1 tablet Oral Daily     gabapentin  900 mg Oral 4x Daily     hydrocortisone-pramoxine  1 Applicatorful Rectal BID     insulin aspart  1-3 Units Subcutaneous TID AC     insulin aspart  1-3 Units Subcutaneous At Bedtime     methylPREDNISolone  32 mg Intravenous Q12H     [Held by provider] predniSONE  5 mg Oral  Daily     rosuvastatin  10 mg Oral Daily     sodium chloride (PF)  3 mL Intracatheter Q8H     triamcinolone   Mouth/Throat BID     [START ON 5/3/2021] vancomycin  125 mg Oral Every Other Day     vancomycin  125 mg Oral Daily     venlafaxine  225 mg Oral Daily with breakfast     Vitamin D3  25 mcg Oral Daily

## 2021-04-29 NOTE — PLAN OF CARE
Time: 1900-0730    Reason for admission: UC flare  Activity:  Ind in room  Pain:  C/o pain in left abdomen. Some relief with tylenol and tramadol  Neuro:  AOx4, PERRLA. Able to make needs known  Cardiac:  WDL, mild edema throughout  Respiratory: WDL, stable on RA. No SOB reported  GI/: Bloody stool, pt is good historian and reported own I/Os. Proctofoam used and is offering some relief. Voiding w/o difficulty.   Diet:  Reg diet, carb counts  Lines:  R PIV SL, WDL.  Skin:  No noted issues  Labs/Imaging: BG stable, insulin corrected    New changes this shift:  Melatonin added for sleep. Pt is having trouble sleeping with steroids in place. Melatonin did aide with sleep. Steroids are also cause pt to experience chills with shaking and sweating. Linen change performed.     Plan: Continue to monitor and follow POC.

## 2021-04-30 LAB
ALBUMIN SERPL-MCNC: 3.7 G/DL (ref 3.4–5)
ALP SERPL-CCNC: 60 U/L (ref 40–150)
ALT SERPL W P-5'-P-CCNC: 158 U/L (ref 0–50)
ANION GAP SERPL CALCULATED.3IONS-SCNC: 6 MMOL/L (ref 3–14)
AST SERPL W P-5'-P-CCNC: 72 U/L (ref 0–45)
BILIRUB SERPL-MCNC: 0.1 MG/DL (ref 0.2–1.3)
BUN SERPL-MCNC: 22 MG/DL (ref 7–30)
CALCIUM SERPL-MCNC: 9.4 MG/DL (ref 8.5–10.1)
CHLORIDE SERPL-SCNC: 105 MMOL/L (ref 94–109)
CO2 SERPL-SCNC: 26 MMOL/L (ref 20–32)
CREAT SERPL-MCNC: 0.66 MG/DL (ref 0.52–1.04)
CRP SERPL-MCNC: 4.3 MG/L (ref 0–8)
ERYTHROCYTE [DISTWIDTH] IN BLOOD BY AUTOMATED COUNT: 12.4 % (ref 10–15)
ERYTHROCYTE [SEDIMENTATION RATE] IN BLOOD BY WESTERGREN METHOD: 8 MM/H (ref 0–20)
GFR SERPL CREATININE-BSD FRML MDRD: >90 ML/MIN/{1.73_M2}
GLUCOSE BLDC GLUCOMTR-MCNC: 153 MG/DL (ref 70–99)
GLUCOSE BLDC GLUCOMTR-MCNC: 179 MG/DL (ref 70–99)
GLUCOSE BLDC GLUCOMTR-MCNC: 182 MG/DL (ref 70–99)
GLUCOSE BLDC GLUCOMTR-MCNC: 189 MG/DL (ref 70–99)
GLUCOSE BLDC GLUCOMTR-MCNC: 260 MG/DL (ref 70–99)
GLUCOSE SERPL-MCNC: 196 MG/DL (ref 70–99)
HCT VFR BLD AUTO: 40.3 % (ref 35–47)
HGB BLD-MCNC: 13.3 G/DL (ref 11.7–15.7)
MCH RBC QN AUTO: 30.8 PG (ref 26.5–33)
MCHC RBC AUTO-ENTMCNC: 33 G/DL (ref 31.5–36.5)
MCV RBC AUTO: 93 FL (ref 78–100)
PLATELET # BLD AUTO: 307 10E9/L (ref 150–450)
POTASSIUM SERPL-SCNC: 3.8 MMOL/L (ref 3.4–5.3)
PROT SERPL-MCNC: 7.2 G/DL (ref 6.8–8.8)
RBC # BLD AUTO: 4.32 10E12/L (ref 3.8–5.2)
SODIUM SERPL-SCNC: 137 MMOL/L (ref 133–144)
WBC # BLD AUTO: 19.3 10E9/L (ref 4–11)

## 2021-04-30 PROCEDURE — 80053 COMPREHEN METABOLIC PANEL: CPT | Performed by: STUDENT IN AN ORGANIZED HEALTH CARE EDUCATION/TRAINING PROGRAM

## 2021-04-30 PROCEDURE — 250N000013 HC RX MED GY IP 250 OP 250 PS 637: Performed by: STUDENT IN AN ORGANIZED HEALTH CARE EDUCATION/TRAINING PROGRAM

## 2021-04-30 PROCEDURE — 85652 RBC SED RATE AUTOMATED: CPT | Performed by: STUDENT IN AN ORGANIZED HEALTH CARE EDUCATION/TRAINING PROGRAM

## 2021-04-30 PROCEDURE — 999N001017 HC STATISTIC GLUCOSE BY METER IP

## 2021-04-30 PROCEDURE — 250N000011 HC RX IP 250 OP 636: Performed by: STUDENT IN AN ORGANIZED HEALTH CARE EDUCATION/TRAINING PROGRAM

## 2021-04-30 PROCEDURE — 85027 COMPLETE CBC AUTOMATED: CPT | Performed by: STUDENT IN AN ORGANIZED HEALTH CARE EDUCATION/TRAINING PROGRAM

## 2021-04-30 PROCEDURE — 99232 SBSQ HOSP IP/OBS MODERATE 35: CPT | Mod: GC | Performed by: INTERNAL MEDICINE

## 2021-04-30 PROCEDURE — 120N000002 HC R&B MED SURG/OB UMMC

## 2021-04-30 PROCEDURE — 86140 C-REACTIVE PROTEIN: CPT | Performed by: STUDENT IN AN ORGANIZED HEALTH CARE EDUCATION/TRAINING PROGRAM

## 2021-04-30 PROCEDURE — 99233 SBSQ HOSP IP/OBS HIGH 50: CPT | Performed by: INTERNAL MEDICINE

## 2021-04-30 PROCEDURE — 999N000127 HC STATISTIC PERIPHERAL IV START W US GUIDANCE

## 2021-04-30 PROCEDURE — 36415 COLL VENOUS BLD VENIPUNCTURE: CPT

## 2021-04-30 PROCEDURE — 36415 COLL VENOUS BLD VENIPUNCTURE: CPT | Performed by: STUDENT IN AN ORGANIZED HEALTH CARE EDUCATION/TRAINING PROGRAM

## 2021-04-30 RX ORDER — VITAMIN B COMPLEX
75 TABLET ORAL DAILY
Status: DISCONTINUED | OUTPATIENT
Start: 2021-05-01 | End: 2021-05-03 | Stop reason: HOSPADM

## 2021-04-30 RX ADMIN — INSULIN ASPART 1 UNITS: 100 INJECTION, SOLUTION INTRAVENOUS; SUBCUTANEOUS at 18:29

## 2021-04-30 RX ADMIN — GABAPENTIN 900 MG: 300 CAPSULE ORAL at 12:33

## 2021-04-30 RX ADMIN — DICYCLOMINE HYDROCHLORIDE 20 MG: 20 TABLET ORAL at 07:53

## 2021-04-30 RX ADMIN — CALCIUM CARBONATE 600 MG (1,500 MG)-VITAMIN D3 400 UNIT TABLET 1 TABLET: at 07:53

## 2021-04-30 RX ADMIN — PRAMOXINE HYDROCHLORIDE HYDROCORTISONE ACETATE 1 APPLICATORFUL: 100; 100 AEROSOL, FOAM TOPICAL at 21:37

## 2021-04-30 RX ADMIN — INSULIN ASPART 1 UNITS: 100 INJECTION, SOLUTION INTRAVENOUS; SUBCUTANEOUS at 08:00

## 2021-04-30 RX ADMIN — DICYCLOMINE HYDROCHLORIDE 20 MG: 20 TABLET ORAL at 18:32

## 2021-04-30 RX ADMIN — INSULIN ASPART 1 UNITS: 100 INJECTION, SOLUTION INTRAVENOUS; SUBCUTANEOUS at 14:01

## 2021-04-30 RX ADMIN — METHYLPREDNISOLONE SODIUM SUCCINATE 32 MG: 40 INJECTION, POWDER, LYOPHILIZED, FOR SOLUTION INTRAMUSCULAR; INTRAVENOUS at 07:50

## 2021-04-30 RX ADMIN — METHYLPREDNISOLONE SODIUM SUCCINATE 32 MG: 40 INJECTION, POWDER, LYOPHILIZED, FOR SOLUTION INTRAMUSCULAR; INTRAVENOUS at 21:37

## 2021-04-30 RX ADMIN — VANCOMYCIN HYDROCHLORIDE 125 MG: 125 CAPSULE ORAL at 07:52

## 2021-04-30 RX ADMIN — TRAMADOL HYDROCHLORIDE 50 MG: 50 TABLET ORAL at 09:14

## 2021-04-30 RX ADMIN — ACETAMINOPHEN 975 MG: 325 TABLET, FILM COATED ORAL at 09:14

## 2021-04-30 RX ADMIN — PRAMOXINE HYDROCHLORIDE HYDROCORTISONE ACETATE 1 APPLICATORFUL: 100; 100 AEROSOL, FOAM TOPICAL at 09:05

## 2021-04-30 RX ADMIN — HYDROXYZINE HYDROCHLORIDE 25 MG: 25 TABLET, FILM COATED ORAL at 21:50

## 2021-04-30 RX ADMIN — VENLAFAXINE HYDROCHLORIDE 225 MG: 225 TABLET, EXTENDED RELEASE ORAL at 07:49

## 2021-04-30 RX ADMIN — DICYCLOMINE HYDROCHLORIDE 20 MG: 20 TABLET ORAL at 12:28

## 2021-04-30 RX ADMIN — PRAMOXINE HYDROCHLORIDE HYDROCORTISONE ACETATE 1 APPLICATORFUL: 100; 100 AEROSOL, FOAM TOPICAL at 08:00

## 2021-04-30 RX ADMIN — ENOXAPARIN SODIUM 40 MG: 100 INJECTION SUBCUTANEOUS at 07:49

## 2021-04-30 RX ADMIN — GABAPENTIN 900 MG: 300 CAPSULE ORAL at 07:52

## 2021-04-30 RX ADMIN — ROSUVASTATIN CALCIUM 10 MG: 10 TABLET, FILM COATED ORAL at 07:53

## 2021-04-30 RX ADMIN — GABAPENTIN 900 MG: 300 CAPSULE ORAL at 21:36

## 2021-04-30 RX ADMIN — GABAPENTIN 900 MG: 300 CAPSULE ORAL at 16:24

## 2021-04-30 RX ADMIN — Medication 1 TABLET: at 07:52

## 2021-04-30 RX ADMIN — Medication 5 MG: at 21:50

## 2021-04-30 RX ADMIN — Medication 25 MCG: at 07:51

## 2021-04-30 ASSESSMENT — ACTIVITIES OF DAILY LIVING (ADL)
ADLS_ACUITY_SCORE: 13

## 2021-04-30 NOTE — PLAN OF CARE
Time: 1900-0730     Reason for admission: UC flare  Activity:  Ind in room  Pain:  C/o pain in left abdomen. Some relief with tylenol and tramadol  Neuro:  AOx4, PERRLA. Able to make needs known  Cardiac:  WDL, mild edema throughout  Respiratory: WDL, stable on RA. No SOB reported  GI/: Bloody stool, pt is good historian and reported own I/Os Overnight I/Os need to be recorded this AM. Proctofoam used and is offering some relief from BMs. Voiding w/o difficulty.   Diet:  Reg diet, carb counts  Lines:  R PIV SL, WDL.  Skin:  No noted issues  Labs/Imaging: BG stable, insulin corrected     New changes this shift:  Melatonin aided in sleep. PRN bedtime atarax given to aide with sleep as well.      Plan: Continue to monitor and follow POC.

## 2021-04-30 NOTE — PLAN OF CARE
Pt is A/Ox4, VSS and C/O pain in toy area. PRN q6 tylenol and tramadol given once today at 09:14AM. On reg diet. Pt is keeping a stool log that staff is to transfer into epic. On enteric ISO for hx of c diff. Bg checks ACHS, pt was 153 and 189 so far today, sliding scale insulin utilized as needed. R PIV removed d/t occluded and new R PIV in place. Plan is to continue to manage UC flare and pt can discharge home once stool is more formed, no blood present in stool and stools are less frequent.

## 2021-04-30 NOTE — PROGRESS NOTES
GASTROENTEROLOGY PROGRESS NOTE          ASSESSMENT AND RECOMMENDATIONS:   Assessment:  47 yo F with significant PMH of h/o nivolumab induced colitis treated with Remicade and Entyvio now in remission, h/o giardia infection, h/o C.diff infection 8 years ago, seronegative RA on prednisone & tocilizumab, recently strep throat treated with penicillin c/b C.diff and started PO Vancomycin 4/7 without improvement, now with diarrhea, bloody stools, and new diagnosis of UC proctosigmoiditis.     #Ulcerative proctosigmoiditis  # H/o checkpoint inhibitor induced colitis s/p Remicade  # RA on tocilizumab & Prednisone  #Tenesmus, bloody diarrhea  CRP continues to trend down, pending today. Stools also improved from 10 >> 6 daily >>4 today. She also feels that there is decreased blood and more solid components. Progress so far on 72 hours of IV steroids is encouraging. Tomorrow will reassess for continued improvement and ability to transition to PO steroids. If her progress worsens will consider salvage therapy with cyclosporine. Dr. Boudreaux has been in communication with her [place] care team, who are in agreement with reinitiating vedolizumab in outpatient setting. She will continue to follow at [place] to keep all her GI in one healthcare system.    Recommendations  -Continue IV steroids through the end of today (should receive this evening's dose)  -If she continues to feel better tomorrow, we will consider transitioning to PO steroids tomorrow  -Continue daily CRP  -Continue to document number of stools daily and the % containing blood  -Continue bentyl and proctofoam to address tenesmus   -Plan for follow-up with [place] [facility], but she will let us know if she wants to transfer care here    GI will continue to follow. Thank you for involving us in this patient's care. Please do not hesitate to contact the GI service with any questions or concerns.     Pt care plan discussed with Dr. New, GI staff physician.    Jamal Sun,  "MD  GI Fellow  [phone]        Interval History:   4 stools this morning already, but they do feel more firm and with less blood. No episodes of blood only.          Medications:     Current Facility-Administered Medications   Medication     acetaminophen (TYLENOL) tablet 975 mg     calcium carbonate 600 mg-vitamin D 400 units (CALTRATE) per tablet 1 tablet     glucose gel 15-30 g    Or     dextrose 50 % injection 25-50 mL    Or     glucagon injection 1 mg     dicyclomine (BENTYL) tablet 20 mg     enoxaparin ANTICOAGULANT (LOVENOX) injection 40 mg     ferrous fumarate 65 mg ([place]. FE)-Vitamin C 125 mg (VITRON C) tablet 1 tablet     gabapentin (NEURONTIN) capsule 900 mg     hydrocortisone-pramoxine (PROCTOFOAM-HC) rectal foam 1 Applicatorful     hydrOXYzine (ATARAX) tablet 25 mg     insulin aspart (NovoLOG) injection (RAPID ACTING)     insulin aspart (NovoLOG) injection (RAPID ACTING)     lidocaine (LMX4) cream     lidocaine 1 % 1 mL     melatonin tablet 5 mg     methylPREDNISolone sodium succinate (solu-MEDROL) injection 32 mg     [Held by provider] predniSONE (DELTASONE) tablet 5 mg     rosuvastatin (CRESTOR) tablet 10 mg     sodium chloride (PF) 0.9% PF flush 3 mL     sodium chloride (PF) 0.9% PF flush 3 mL     traMADol (ULTRAM) tablet 50 mg     triamcinolone (KENALOG) 0.1 % paste     [START ON 5/3/2021] vancomycin (VANCOCIN) capsule 125 mg     vancomycin (VANCOCIN) capsule 125 mg     venlafaxine (EFFEXOR-ER) 24 hr tablet 225 mg     [START ON 5/1/2021] Vitamin D3 (CHOLECALCIFEROL) tablet 75 mcg     Facility-Administered Medications Ordered in Other Encounters   Medication     lidocaine 1 % 9 mL     sodium bicarbonate 8.4 % injection 1 mEq        Physical Exam   Blood pressure (!) 151/75, pulse 63, temperature 97.1  F (36.2  C), temperature source Oral, resp. rate 18, height 1.6 m (5' 3\"), weight 112.6 kg (248 lb 3.2 oz), SpO2 95 %, not currently breastfeeding.  Constitutional: cooperative, pleasant, not " dyspneic/diaphoretic, no acute distress  Eyes: Sclera anicteric/injected  Ears/nose/mouth/throat: Normal oropharynx without ulcers or exudate, mucus membranes moist  Neck: supple  CV: No edema  Respiratory: Unlabored breathing  Abd:  Nondistended, +bs, no hepatosplenomegaly, mild general tenderness, no peritoneal signs  Skin: warm, perfused, no jaundice  Neuro: AAO x 3  Psych: Normal affect  MSK: No gross deformities    Data   Current Labs  CBC  Recent Labs   Lab 04/29/21 0826 04/28/21 1006 04/27/21 0923 04/26/21 1852 04/26/21  1835   WBC  --  15.7* 15.5*  --  14.5*   RBC  --  3.90 4.12  --  3.99   HGB  --  11.7 12.9 12.6 12.3   HCT  --  36.4 38.8  --  37.0   MCV  --  93 94  --  93   MCH  --  30.0 31.3  --  30.8   MCHC  --  32.1 33.2  --  33.2   RDW  --  12.7 12.7  --  12.9    293 289  --  291     BMP  Recent Labs   Lab 04/29/21 0826 04/28/21 1006 04/27/21 0923 04/26/21 1852 04/26/21  1835    136 139 140 138   POTASSIUM 4.2 3.9 4.0 3.9 3.9   CHLORIDE 107 106 107  --  106   CO2 25 24 25  --  26   ANIONGAP 8 6 7  --  5   * 286* 119* 115* 111*   BUN 20 16 10  --  11   CR 0.67 0.74 0.66  --  0.71   GFRESTIMATED >90 >90 >90  --  >90   GFRESTBLACK >90 >90 >90  --  >90   PATRICIA 9.0 8.9 9.2  --  9.5      INRNo lab results found in last 7 days.  Liver panel  Recent Labs   Lab 04/29/21 0826 04/28/21 1006 04/27/21 0923 04/26/21  1835   PROTTOTAL 6.7* 6.8 7.0 7.2   ALBUMIN 3.5 3.5 3.4 3.8   BILITOTAL 0.2 0.2 0.3 0.2   ALKPHOS 52 48 57 55   AST 27 12 11 15   ALT 58* 37 35 41       Imaging/procedures:  Reviewed in EMR

## 2021-04-30 NOTE — PROGRESS NOTES
Vascular Access Services Notes:    Lab draw via RUE PIV.  was present to assist.        YUMIKO CasasN, RN [facility]

## 2021-04-30 NOTE — PROGRESS NOTES
CLINICAL NUTRITION SERVICES - ASSESSMENT NOTE     Nutrition Prescription    RECOMMENDATIONS FOR MDs/PROVIDERS TO ORDER:  1. If appetite declines and/or diarrhea doesn't continue to improve, start calorie counts and/or consider need for nutrition support if appropriate  2. Diet per primary team    Malnutrition Status:    Non-severe malnutrition in the context of acute illness    Recommendations already ordered by Registered Dietitian (RD):  PRN supplements    Future/Additional Recommendations:  1. Encourage supplements and/or scheduled snacks between meal times if appetite low.  Small/frequent meals.     2. If plan to start TPN for bowel rest, please consult Pharmacy/Nutrition to start and manage TPN.  Would recommend the following:  --Use dosing weight 67 kg  --Begin CPN, goal volume 1800 ml/day (or other per provider/pending fluid status) with initial 140 g Dex daily (GIR 1.5), 100g AA daily, and 250 ml 20% IV lipids 5 days/week.   --Advance PN dex by 55 g every 1-2 days (per Pharm D/RD discretion) to initial goal of 250 g Dex to increase provisions to 1607 kcals (24 kcal/kg/day), 1.5 g PRO/kg/day, GIR 2.6 with 22% kcals from Fat.     REASON FOR ASSESSMENT  Doretha Fernandez is a/an 46 year old female assessed by the dietitian for Malnutrition screening tool score >/=2 (weight loss and decreased appetite)    NUTRITION HISTORY  Per provider notes, pt admit with 1 month of abdominal pain and watery diarrhea, decreased appetite as well.  New diagnosis of UC at OSH PTA.  On admission pt was reporting hesitancy to eat 2/2 bloody diarrhea.  GI following this admission and has been started in IV steroids, plan to transition to PO steroids.     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  Per provider notes today, stools are firming up and with less blood.  Still with abdominal pain.     LABS  Labs reviewed    MEDICATIONS  Medications reviewed    ANTHROPOMETRICS  Height: 160 cm (5' 3\")  Most Recent Weight: 112.6 kg (248 lb 3.2 oz)    IBW: 52.3 kg   BMI: Obesity Grade III BMI >40  Weight History: 7 lb wt loss the past 2 weeks (2.7% wt loss), suspect due to diarrhea (loss of fluids and decreased PO)  Wt Readings from Last 10 Encounters:   04/27/21 112.6 kg (248 lb 3.2 oz)   04/13/21 115.7 kg (255 lb)   04/05/21 115.7 kg (255 lb)   02/18/21 117 kg (258 lb)   09/23/20 112.4 kg (247 lb 12.8 oz)   09/06/20 114.8 kg (253 lb)   08/27/20 114.9 kg (253 lb 6.4 oz)   08/07/20 113.4 kg (250 lb)   07/16/20 111.1 kg (245 lb)   07/09/20 111.5 kg (245 lb 12.8 oz)      Dosing Weight: 67 kg (adjusted based on actual wt 112.6 kg and IBW)    ASSESSED NUTRITION NEEDS  Estimated Energy Needs: [id number] kcals/day (20  - 25 - 30 kcals/kg)  Justification: Maintenance (25-30 kcal/kg) for PO/EN, but aim lower end; lower range (20-25 kcal/kg) if requires PN  Estimated Protein Needs:  grams protein/day (1.2 - 1.5 grams of pro/kg)  Justification: Hypercatabolism with acute illness  Estimated Fluid Needs: (1 mL/kcal)   Justification: Maintenance, or other per provider pending fluid status    PHYSICAL FINDINGS  See malnutrition section below.    MALNUTRITION  % Intake: < 75% for > 7 days (non-severe) -> likely combo of decreased appetite and malabsorption from diarrhea  % Weight Loss: > 2% in 1 week (severe)  Subcutaneous Fat Loss: Unable to assess  Muscle Loss: Unable to assess  Fluid Accumulation/Edema: None noted per provider note today  Malnutrition Diagnosis: Non-severe malnutrition in the context " of acute illness    NUTRITION DIAGNOSIS  Inadequate oral intake related to decreased appetite/po intakes 2/2 diarrhea as evidenced by reported decreased appetite and 2.7% wt loss over the past 2 weeks PTA    INTERVENTIONS  Implementation  Nutrition Education: Unable to complete due to pt unavailable during attempts to visit today  Medical food supplement therapy     Goals  Patient to consume % of nutritionally adequate meal trays TID, or the equivalent with supplements/snacks.     Monitoring/Evaluation  Progress toward goals will be monitored and evaluated per protocol.     Latosha Olivarez RD, LD  Pager: [phone]  Units covered: 7C (all beds) and 5A (beds 9531 through 5211-2)

## 2021-04-30 NOTE — PROGRESS NOTES
[facility]    Medicine Progress Note - Hospitalist Service, Terrie Garza       Date of Admission:  4/26/2021  Assessment & Plan        Doretha Fernandez is a 46 year old female admitted on 4/26/2021. She has a history of metastatic melanoma of unknown primary, seronegative rheumatoid arthritis (on prednisone and tocilizumab), steroid-induced diabetes, C.difficile infection, prior hx of nivolumab-induced colitis in 2018 who is admitted for ulcerative colitis flare. GI is consulted.      IBD flare (ulcerative colitis)  C.difficile colitis  History of nivolumab-induced colitis  Colonic diverticulosis  Patient has a history of colitis after treatment with nivolumab for melanoma in 2018 which was refractory to steroid treatment but improved with infliximab and vedolizumab. Now developed 1 month history of abdominal pain, diarrhea with BRBPR. Tested positive for C.difficile 4/5/2021 and started on PO vancomycin 4/7/2021 with minimal improvement. Had flexible sigmoidoscopy (4/22/2021) showed diffuse inflammation with biopsy result consistent with active chronic colitis, thought to be diagnostic for ulcerative colitis. C.difficile on admission negative.  - Continue vancomycin PO taper, currently on 125 mg daily per last prescription  - enteric stool panel and ova&parasite negative  - Fecal calprotectin elevated to 134  - CMV PCR negative  - Trend CRP, CBC, CMP  - Pain control with acetaminophen and tramadol PRN  - GI consult for management of IBD flare  - Proctofoam BID ordered by GI for tenesmus  - Advance diet as tolerated  - Quant Gold and Hep B negative  - Will need to monitor & record stool frequency and % blood  - Continue methylprednisolone 32 mg BID IV   - Continue Bentyl 20 mg   - to reassess tomorrow if patient is able to transition to PO steroids vs start cyclosporine. Preferred treatment would be to transition to PO steroids to start outpatient entyvio.     Elevated Blood  Pressure  - likely from steroids, if able to switch to PO and start tapering then will hold off on treatment. Otherwise patient may benefit from amlodipine 5mg while taking steroids. CTM    Seronegative RA  Trials of adalimumab and Enbrel were not successful. Has required corticosteroid therapy for the past three years with which she has had significant difficulty in tapering. Does not appears to have acute flare on presentation.  - Hold PTA prednisone  - Patient due for Tocilizumab (last dose 4/11), will hold-off for now with steroid treatment. Consider discuss with rheumatology on discharge for Tocilizumab vs Vedolizumab     Steroid-induced DM  On chronic steroid treatment for ICI-induced colitis and seronegative RA. Anticipate hyperglycemia now that high-dose steroid initiated for treatment of IBD flare.  - BS q4h, target 140-180 mg/dL. Hypoglycemia protocol.  - Low sliding scale insulin while NPO  - Hold PTA metformin 1000 mg daily    Leukocytosis  - from steroids     SANDRA/MDD  -Continue PTA Venlafaxine.     STORMY  Morbid obesity  - CPAP home setting     RLS:   - Continue with PTA Gabapentin 900 mg QID     Metastatic melanoma  Diagnosed on lymph node biopsy in Oct 2017, unclear primary. Treated with nivolumab and developed nivolumab-induced colitis in May 2018 treated as above. Now in complete remission and received no treatment for the past 2 years.     Diet: Regular Diet Adult    DVT Prophylaxis: Enoxaparin (Lovenox) SQ  Major Catheter: not present  Code Status: Full Code           Disposition Plan   Expected discharge: 2 - 3 days, recommended to prior living arrangement once BM blood decreased and transtioned to PO steroids.  Entered: Avelino Peralta MD 04/29/2021, 8:04 PM       The patient's care was discussed with the Bedside Nurse, Patient and GI Consultant.    Avelino Peralta MD  Hospitalist Service, [address]  Contact information available via [facility]  Paging/Directory  Please see sign in/sign out for up to date coverage information    Time Spent on this Encounter   I spent 40 minutes on the unit/floor managing the care of Doretha Fernandez. Over 50% of my time was spent on the following:   - Counseling the patient and/or family regarding: diagnostic results, prognosis and risks and benefits of treatment options  - Coordination of care with the: consultant(s)    - coordination with GI  - discussion with patient regarding current symptoms, plan for steroids, discussion of cyclosporine.    Avelino Peralta MD    ______________________________________________________________________    Interval History   Overnight continued to have bloody bowel movement. The first BM today was mostly blood, but since then 3 bowel movements have been 80% stool, 20% blood. Abdominal pain is still significant. Patient would like to be home but understands that clear treatment plan is needed. She is struggling being away from her daughters. No fevers, no chills, no shortness of breath, no chest pain.    Otherwise 4pt ROS is negative    Data reviewed today: I reviewed all medications, new labs and imaging results over the last 24 hours. I personally reviewed no images or EKG's today.    Physical Exam   Vital Signs: Temp: 97.5  F (36.4  C) Temp src: Oral BP: 138/55 Pulse: 73   Resp: 16 SpO2: 93 % O2 Device: None (Room air)    Weight: 248 lbs 3.2 oz  Gen: NAD, sitting comfortably in bed  Mouth: OP clear, no lesions  CV: RRR, no murmurs, 2+ radial pulses  RESP: CTA bilaterally, no w/r/c  Abd: soft, nontender, nondistended  Ext: no edema bilaterally  Neuro: CNII-CNXII intact     Data   Recent Labs   Lab 04/29/21  0826 04/28/21  1006 04/27/21  0923 04/26/21  1852 04/26/21  1835   WBC  --  15.7* 15.5*  --  14.5*   HGB  --  11.7 12.9 12.6 12.3   MCV  --  93 94  --  93    293 289  --  291    136 139 140 138   POTASSIUM 4.2 3.9 4.0 3.9 3.9   CHLORIDE 107 106 107  --  106   CO2 25 24 25   --  26   BUN 20 16 10  --  11   CR 0.67 0.74 0.66  --  0.71   ANIONGAP 8 6 7  --  5   PATRICIA 9.0 8.9 9.2  --  9.5   * 286* 119* 115* 111*   ALBUMIN 3.5 3.5 3.4  --  3.8   PROTTOTAL 6.7* 6.8 7.0  --  7.2   BILITOTAL 0.2 0.2 0.3  --  0.2   ALKPHOS 52 48 57  --  55   ALT 58* 37 35  --  41   AST 27 12 11  --  15

## 2021-05-01 LAB
ALBUMIN SERPL-MCNC: 3.3 G/DL (ref 3.4–5)
ALP SERPL-CCNC: 53 U/L (ref 40–150)
ALT SERPL W P-5'-P-CCNC: 140 U/L (ref 0–50)
ANION GAP SERPL CALCULATED.3IONS-SCNC: 6 MMOL/L (ref 3–14)
AST SERPL W P-5'-P-CCNC: 36 U/L (ref 0–45)
BILIRUB SERPL-MCNC: 0.2 MG/DL (ref 0.2–1.3)
BUN SERPL-MCNC: 23 MG/DL (ref 7–30)
CALCIUM SERPL-MCNC: 8.9 MG/DL (ref 8.5–10.1)
CHLORIDE SERPL-SCNC: 105 MMOL/L (ref 94–109)
CO2 SERPL-SCNC: 26 MMOL/L (ref 20–32)
CREAT SERPL-MCNC: 0.68 MG/DL (ref 0.52–1.04)
CRP SERPL-MCNC: <2.9 MG/L (ref 0–8)
ERYTHROCYTE [DISTWIDTH] IN BLOOD BY AUTOMATED COUNT: 12.8 % (ref 10–15)
ERYTHROCYTE [SEDIMENTATION RATE] IN BLOOD BY WESTERGREN METHOD: 8 MM/H (ref 0–20)
GFR SERPL CREATININE-BSD FRML MDRD: >90 ML/MIN/{1.73_M2}
GLUCOSE BLDC GLUCOMTR-MCNC: 146 MG/DL (ref 70–99)
GLUCOSE BLDC GLUCOMTR-MCNC: 181 MG/DL (ref 70–99)
GLUCOSE BLDC GLUCOMTR-MCNC: 205 MG/DL (ref 70–99)
GLUCOSE SERPL-MCNC: 319 MG/DL (ref 70–99)
HCT VFR BLD AUTO: 39.6 % (ref 35–47)
HGB BLD-MCNC: 12.9 G/DL (ref 11.7–15.7)
MCH RBC QN AUTO: 30.5 PG (ref 26.5–33)
MCHC RBC AUTO-ENTMCNC: 32.6 G/DL (ref 31.5–36.5)
MCV RBC AUTO: 94 FL (ref 78–100)
PLATELET # BLD AUTO: 305 10E9/L (ref 150–450)
POTASSIUM SERPL-SCNC: 4.2 MMOL/L (ref 3.4–5.3)
PROT SERPL-MCNC: 6.4 G/DL (ref 6.8–8.8)
RBC # BLD AUTO: 4.23 10E12/L (ref 3.8–5.2)
SODIUM SERPL-SCNC: 136 MMOL/L (ref 133–144)
WBC # BLD AUTO: 20 10E9/L (ref 4–11)

## 2021-05-01 PROCEDURE — 80053 COMPREHEN METABOLIC PANEL: CPT | Performed by: STUDENT IN AN ORGANIZED HEALTH CARE EDUCATION/TRAINING PROGRAM

## 2021-05-01 PROCEDURE — 86140 C-REACTIVE PROTEIN: CPT | Performed by: STUDENT IN AN ORGANIZED HEALTH CARE EDUCATION/TRAINING PROGRAM

## 2021-05-01 PROCEDURE — 120N000002 HC R&B MED SURG/OB UMMC

## 2021-05-01 PROCEDURE — 250N000011 HC RX IP 250 OP 636: Performed by: STUDENT IN AN ORGANIZED HEALTH CARE EDUCATION/TRAINING PROGRAM

## 2021-05-01 PROCEDURE — 250N000013 HC RX MED GY IP 250 OP 250 PS 637: Performed by: STUDENT IN AN ORGANIZED HEALTH CARE EDUCATION/TRAINING PROGRAM

## 2021-05-01 PROCEDURE — 999N001017 HC STATISTIC GLUCOSE BY METER IP

## 2021-05-01 PROCEDURE — 99232 SBSQ HOSP IP/OBS MODERATE 35: CPT | Mod: GC | Performed by: INTERNAL MEDICINE

## 2021-05-01 PROCEDURE — 36415 COLL VENOUS BLD VENIPUNCTURE: CPT | Performed by: STUDENT IN AN ORGANIZED HEALTH CARE EDUCATION/TRAINING PROGRAM

## 2021-05-01 PROCEDURE — 250N000013 HC RX MED GY IP 250 OP 250 PS 637: Performed by: INTERNAL MEDICINE

## 2021-05-01 PROCEDURE — 99233 SBSQ HOSP IP/OBS HIGH 50: CPT | Mod: GC | Performed by: INTERNAL MEDICINE

## 2021-05-01 PROCEDURE — 85652 RBC SED RATE AUTOMATED: CPT | Performed by: STUDENT IN AN ORGANIZED HEALTH CARE EDUCATION/TRAINING PROGRAM

## 2021-05-01 PROCEDURE — 85027 COMPLETE CBC AUTOMATED: CPT | Performed by: STUDENT IN AN ORGANIZED HEALTH CARE EDUCATION/TRAINING PROGRAM

## 2021-05-01 RX ORDER — AMLODIPINE BESYLATE 5 MG/1
5 TABLET ORAL DAILY
Status: DISCONTINUED | OUTPATIENT
Start: 2021-05-02 | End: 2021-05-03 | Stop reason: HOSPADM

## 2021-05-01 RX ORDER — AMLODIPINE BESYLATE 2.5 MG/1
2.5 TABLET ORAL ONCE
Status: COMPLETED | OUTPATIENT
Start: 2021-05-01 | End: 2021-05-01

## 2021-05-01 RX ORDER — AMLODIPINE BESYLATE 2.5 MG/1
2.5 TABLET ORAL DAILY
Status: DISCONTINUED | OUTPATIENT
Start: 2021-05-01 | End: 2021-05-01

## 2021-05-01 RX ADMIN — INSULIN ASPART 1 UNITS: 100 INJECTION, SOLUTION INTRAVENOUS; SUBCUTANEOUS at 09:46

## 2021-05-01 RX ADMIN — PRAMOXINE HYDROCHLORIDE HYDROCORTISONE ACETATE 1 APPLICATORFUL: 100; 100 AEROSOL, FOAM TOPICAL at 09:47

## 2021-05-01 RX ADMIN — VANCOMYCIN HYDROCHLORIDE 125 MG: 125 CAPSULE ORAL at 09:53

## 2021-05-01 RX ADMIN — METHYLPREDNISOLONE SODIUM SUCCINATE 32 MG: 40 INJECTION, POWDER, LYOPHILIZED, FOR SOLUTION INTRAMUSCULAR; INTRAVENOUS at 09:45

## 2021-05-01 RX ADMIN — CALCIUM CARBONATE 600 MG (1,500 MG)-VITAMIN D3 400 UNIT TABLET 1 TABLET: at 09:54

## 2021-05-01 RX ADMIN — ROSUVASTATIN CALCIUM 10 MG: 10 TABLET, FILM COATED ORAL at 09:55

## 2021-05-01 RX ADMIN — VENLAFAXINE HYDROCHLORIDE 225 MG: 225 TABLET, EXTENDED RELEASE ORAL at 09:54

## 2021-05-01 RX ADMIN — GABAPENTIN 900 MG: 300 CAPSULE ORAL at 13:07

## 2021-05-01 RX ADMIN — Medication 5 MG: at 21:13

## 2021-05-01 RX ADMIN — ACETAMINOPHEN 975 MG: 325 TABLET, FILM COATED ORAL at 08:22

## 2021-05-01 RX ADMIN — METHYLPREDNISOLONE SODIUM SUCCINATE 32 MG: 40 INJECTION, POWDER, LYOPHILIZED, FOR SOLUTION INTRAMUSCULAR; INTRAVENOUS at 21:16

## 2021-05-01 RX ADMIN — INSULIN ASPART 3 UNITS: 100 INJECTION, SOLUTION INTRAVENOUS; SUBCUTANEOUS at 13:39

## 2021-05-01 RX ADMIN — PRAMOXINE HYDROCHLORIDE HYDROCORTISONE ACETATE 1 APPLICATORFUL: 100; 100 AEROSOL, FOAM TOPICAL at 21:25

## 2021-05-01 RX ADMIN — DICYCLOMINE HYDROCHLORIDE 20 MG: 20 TABLET ORAL at 09:55

## 2021-05-01 RX ADMIN — DICYCLOMINE HYDROCHLORIDE 20 MG: 20 TABLET ORAL at 13:07

## 2021-05-01 RX ADMIN — GABAPENTIN 900 MG: 300 CAPSULE ORAL at 15:59

## 2021-05-01 RX ADMIN — AMLODIPINE BESYLATE 2.5 MG: 2.5 TABLET ORAL at 17:20

## 2021-05-01 RX ADMIN — ENOXAPARIN SODIUM 40 MG: 100 INJECTION SUBCUTANEOUS at 09:47

## 2021-05-01 RX ADMIN — Medication 75 MCG: at 09:54

## 2021-05-01 RX ADMIN — AMLODIPINE BESYLATE 2.5 MG: 2.5 TABLET ORAL at 13:08

## 2021-05-01 RX ADMIN — Medication 1 TABLET: at 09:55

## 2021-05-01 RX ADMIN — GABAPENTIN 900 MG: 300 CAPSULE ORAL at 09:53

## 2021-05-01 RX ADMIN — TRAMADOL HYDROCHLORIDE 50 MG: 50 TABLET ORAL at 09:52

## 2021-05-01 RX ADMIN — HYDROXYZINE HYDROCHLORIDE 25 MG: 25 TABLET, FILM COATED ORAL at 21:13

## 2021-05-01 RX ADMIN — DICYCLOMINE HYDROCHLORIDE 20 MG: 20 TABLET ORAL at 17:20

## 2021-05-01 RX ADMIN — GABAPENTIN 900 MG: 300 CAPSULE ORAL at 21:13

## 2021-05-01 ASSESSMENT — ACTIVITIES OF DAILY LIVING (ADL)
ADLS_ACUITY_SCORE: 13

## 2021-05-01 NOTE — PLAN OF CARE
Pt is A/Ox4 and C/O pain in toy area. PRN q6 tylenol and tramadol given once today. Pt VSS exc HTN. High of 170/79, pt started 5mg PO amlodipine. On reg diet. Pt is keeping a stool log that staff is to transfer into epic. Pt has two reported stools so far today. On enteric ISO for hx of c diff. Bg checks ACHS, pt was 146 and 319 so far today, sliding scale insulin utilized as needed. R PIV SL. Plan is to continue to manage UC flare and pt can discharge home once stool is more formed, no blood present in stool and stools are less frequent. Pt will most likely transition to PO steroid after tomorrows AM IV dose and can possibly discharge home if her s/s are under control.

## 2021-05-01 NOTE — PROGRESS NOTES
Pt. A/O x4 and VSS on RA. Pt. refused pain meds and denied pain during shift. Discomfort reported in perineal area. Pt. is independent in room and able to reposition independently. BG was 260 and 1 unit novolog given at HS. R. PIV SL. Skin is CDI. Enteric precautions maintained. Plan is to continue POC and discharge when stools are more formed.

## 2021-05-01 NOTE — PROGRESS NOTES
GASTROENTEROLOGY PROGRESS NOTE    Date: 05/01/2021     Assessment:  47 yo F with significant PMH of melanoma,  h/o nivolumab induced colitis treated with Remicade and Entyvio now in remission, h/o giardia infection, h/o C.diff infection 8 years ago, seronegative RA on prednisone & tocilizumab, recently strep throat treated with penicillin c/b C.diff and started PO Vancomycin 4/7 without improvement, now with diarrhea, bloody stools, and new diagnosis of UC proctosigmoiditis.     # Acute flare of ulcerative proctosigmoiditis  # H/o checkpoint inhibitor induced colitis s/p Remicade  # RA on tocilizumab & Prednisone  #Tenesmus, bloody diarrhea  - D 10   - BM in the last 24 hours: 7 BM, all have blood, CRP <2.9  - Able to tolerate diet, mild abdominal cramp, no abdominal tenderness  - On Methyl prednisone 32 mg BID, D 5  - Had melanoma, not a candidate for Anti-TNF.  - Considering salvage therapy with cyclosporine/Colectomy if no improvement on steroids   - Dr. Boudreaux has been in communication with her [place] care team, who are in agreement with reinitiating vedolizumab in outpatient setting. She will continue to follow at [place] to keep all her GI in one healthcare system.     Recommendations  -Continue IV steroids for today, we will hold off switching to oral steroids given she had 7 BM  -If she continues to feel better tomorrow, we will consider transitioning to PO steroids tomorrow  - Continue daily CRP  - Continue to document number of stools daily and the % containing blood  - Continue bentyl and proctofoam to address tenesmus  - Continue DVT prophylaxis    -Plan for follow-up with [facility], but she will let us know if she wants to transfer care here     Thank you for involving us in this patient's care. Please do not hesitate to contact the GI service with any questions or concerns.      Pt care plan discussed with CAMI Olivarez, GI staff physician.    Kathy Eduardo MD  Gastroenterology  "Fellow  Division of Gastroenterology, Hepatology and Nutrition  [facility]    _______________________________________________________________    24 Hour Events:  7 BM with blood     Subjective:  Mild abdominal cramps.     Objective:  Blood pressure (!) 170/79, pulse 71, temperature 97.7  F (36.5  C), temperature source Oral, resp. rate 18, height 1.6 m (5' 3\"), weight 112.6 kg (248 lb 3.2 oz), SpO2 94 %, not currently breastfeeding.    Gen: A&Ox3, NAD  HEENT: ncat, perrl, eomi, sclera anicteric  CV: RRR  Lungs: CTA b/l  Abd:  +bs, soft, nd/nt  Skin: no jaundice, no stigmata of chronic liver disease  MS: appropriate muscle mass for age  Neuro: non focal       LABS:  BMP  Recent Labs   Lab 05/01/21  1302 04/30/21  1705 04/29/21  0826 04/28/21  1006    137 140 136   POTASSIUM 4.2 3.8 4.2 3.9   CHLORIDE 105 105 107 106   PATRICIA 8.9 9.4 9.0 8.9   CO2 26 26 25 24   BUN 23 22 20 16   CR 0.68 0.66 0.67 0.74   * 196* 114* 286*     CBC  Recent Labs   Lab 05/01/21  1302 04/30/21  1705 04/29/21  0826 04/28/21  1006 04/27/21  0923   WBC 20.0* 19.3*  --  15.7* 15.5*   RBC 4.23 4.32  --  3.90 4.12   HGB 12.9 13.3  --  11.7 12.9   HCT 39.6 40.3  --  36.4 38.8   MCV 94 93  --  93 94   MCH 30.5 30.8  --  30.0 31.3   MCHC 32.6 33.0  --  32.1 33.2   RDW 12.8 12.4  --  12.7 12.7    307 268 293 289     INRNo lab results found in last 7 days.  LFTs  Recent Labs   Lab 05/01/21  1302 04/30/21  1705 04/29/21  0826 04/28/21  1006   ALKPHOS 53 60 52 48   AST 36 72* 27 12   * 158* 58* 37   BILITOTAL 0.2 0.1* 0.2 0.2   PROTTOTAL 6.4* 7.2 6.7* 6.8   ALBUMIN 3.3* 3.7 3.5 3.5      PANCNo lab results found in last 7 days.    IMAGING:    "

## 2021-05-01 NOTE — PROGRESS NOTES
[facility]    Medicine Progress Note - Hospitalist Service, Terrie Garza       Date of Admission:  4/26/2021  Assessment & Plan      Doretha Fernandez is a 46 year old female admitted on 4/26/2021. She has a history of metastatic melanoma of unknown primary, seronegative rheumatoid arthritis (on prednisone and tocilizumab), steroid-induced diabetes, C.difficile infection, prior hx of nivolumab-induced colitis in 2018 who is admitted for ulcerative colitis flare. GI is consulted.      Plan for Today:  - Continue methylprednisolone 32 mg BID IV, GI considering transition to prednisone 5/2/21  - Started amlodipine 5 mg PO every day for HTN     IBD flare (ulcerative colitis)  C.difficile colitis  History of nivolumab-induced colitis  Colonic diverticulosis  Patient has a history of colitis after treatment with nivolumab for melanoma in 2018 which was refractory to steroid treatment but improved with infliximab and vedolizumab. Now developed 1 month history of abdominal pain, diarrhea with BRBPR. Tested positive for C.difficile 4/5/2021 and started on PO vancomycin 4/7/2021 with minimal improvement. Had flexible sigmoidoscopy (4/22/2021) showed diffuse inflammation with biopsy result consistent with active chronic colitis, thought to be diagnostic for ulcerative colitis. C.difficile on admission negative.  - Continue vancomycin PO taper, currently on 125 mg daily per last prescription  - enteric stool panel and ova&parasite negative  - Fecal calprotectin elevated to 134  - CMV PCR negative  - Trend CRP, CBC, CMP  - Pain control with acetaminophen and tramadol PRN  - GI consult for management of IBD flare  - Proctofoam BID ordered by GI for tenesmus  - Advance diet as tolerated  - Quant Gold and Hep B negative  - Will need to monitor & record stool frequency and % blood  - Continue methylprednisolone 32 mg BID IV   - Continue Bentyl 20 mg   - to reassess tomorrow if patient is able  to transition to PO steroids vs start cyclosporine. Preferred treatment would be to transition to PO steroids to start outpatient entyvio.     Elevated Blood Pressure  - likely from steroids, if able to switch to PO and start tapering then will hold off on treatment.   - Started amlodipine 5 mg PO every day for HTN     Seronegative RA  Trials of adalimumab and Enbrel were not successful. Has required corticosteroid therapy for the past three years with which she has had significant difficulty in tapering. Does not appears to have acute flare on presentation.  - Hold PTA prednisone  - Patient due for Tocilizumab (last dose 4/11), will hold-off for now with steroid treatment. Consider discuss with rheumatology on discharge for Tocilizumab vs Vedolizumab     Steroid-induced DM  On chronic steroid treatment for ICI-induced colitis and seronegative RA. Anticipate hyperglycemia now that high-dose steroid initiated for treatment of IBD flare.  - BS q4h, target 140-180 mg/dL. Hypoglycemia protocol.  - Low sliding scale insulin while NPO  - Hold PTA metformin 1000 mg daily    Leukocytosis  - from steroids     SANDRA/MDD  -Continue PTA Venlafaxine.     STORMY  Morbid obesity  - CPAP home setting     RLS:   - Continue with PTA Gabapentin 900 mg QID     Metastatic melanoma  Diagnosed on lymph node biopsy in Oct 2017, unclear primary. Treated with nivolumab and developed nivolumab-induced colitis in May 2018 treated as above. Now in complete remission and received no treatment for the past 2 years.     Diet: Regular Diet Adult    DVT Prophylaxis: Enoxaparin (Lovenox) SQ  Major Catheter: not present  Code Status: Full Code           Disposition Plan   Expected discharge: 2 - 3 days, recommended to prior living arrangement once BM blood decreased and transtioned to PO steroids.  Entered: Krishna Sepulveda,  05/01/2021, 5:05 PM       The patient's care was discussed with the Attending Physician, Dr. Peralta, Bedside Nurse, Patient  and GI Consultant.    Krishna Sepulveda, DO  PGY3, IM    _____________________________________________________________________    Interval History   No acute events overnight.  Patient still having multiple bowel movements per day, but with decreasing amounts of blood.  GI still looking for <6 BM per day and she is still having more than this.      Denies fevers, chills, abdominal pain, SOB.  She is having a headache, Tylenol 1000 mg is somewhat effective.     Data reviewed today: I reviewed all medications, new labs and imaging results over the last 24 hours. I personally reviewed no images or EKG's today.    Physical Exam   Vital Signs: Temp: 97.7  F (36.5  C) Temp src: Oral BP: (!) 170/79 Pulse: 71   Resp: 18 SpO2: 94 % O2 Device: None (Room air)    Weight: 248 lbs 3.2 oz  Gen: NAD, sitting comfortably in bed  Mouth: OP clear, no lesions  CV: RRR, no murmurs, 2+ radial pulses  RESP: CTA bilaterally, no w/r/c  Abd: soft, nontender, nondistended  Ext: no edema bilaterally  Neuro: CNII-CNXII intact     Data   Recent Labs   Lab 05/01/21  1302 04/30/21  1705 04/29/21  0826 04/28/21  1006   WBC 20.0* 19.3*  --  15.7*   HGB 12.9 13.3  --  11.7   MCV 94 93  --  93    307 268 293    137 140 136   POTASSIUM 4.2 3.8 4.2 3.9   CHLORIDE 105 105 107 106   CO2 26 26 25 24   BUN 23 22 20 16   CR 0.68 0.66 0.67 0.74   ANIONGAP 6 6 8 6   PATRICIA 8.9 9.4 9.0 8.9   * 196* 114* 286*   ALBUMIN 3.3* 3.7 3.5 3.5   PROTTOTAL 6.4* 7.2 6.7* 6.8   BILITOTAL 0.2 0.1* 0.2 0.2   ALKPHOS 53 60 52 48   * 158* 58* 37   AST 36 72* 27 12

## 2021-05-02 LAB
CREAT SERPL-MCNC: 0.7 MG/DL (ref 0.52–1.04)
CRP SERPL-MCNC: <2.9 MG/L (ref 0–8)
ERYTHROCYTE [DISTWIDTH] IN BLOOD BY AUTOMATED COUNT: 12.5 % (ref 10–15)
GFR SERPL CREATININE-BSD FRML MDRD: >90 ML/MIN/{1.73_M2}
GLUCOSE BLDC GLUCOMTR-MCNC: 149 MG/DL (ref 70–99)
GLUCOSE BLDC GLUCOMTR-MCNC: 163 MG/DL (ref 70–99)
GLUCOSE BLDC GLUCOMTR-MCNC: 185 MG/DL (ref 70–99)
GLUCOSE BLDC GLUCOMTR-MCNC: 275 MG/DL (ref 70–99)
GLUCOSE BLDC GLUCOMTR-MCNC: 300 MG/DL (ref 70–99)
HCT VFR BLD AUTO: 42.5 % (ref 35–47)
HGB BLD-MCNC: 14.1 G/DL (ref 11.7–15.7)
MCH RBC QN AUTO: 30.9 PG (ref 26.5–33)
MCHC RBC AUTO-ENTMCNC: 33.2 G/DL (ref 31.5–36.5)
MCV RBC AUTO: 93 FL (ref 78–100)
PLATELET # BLD AUTO: 331 10E9/L (ref 150–450)
RBC # BLD AUTO: 4.57 10E12/L (ref 3.8–5.2)
WBC # BLD AUTO: 20.7 10E9/L (ref 4–11)

## 2021-05-02 PROCEDURE — 99233 SBSQ HOSP IP/OBS HIGH 50: CPT | Performed by: INTERNAL MEDICINE

## 2021-05-02 PROCEDURE — 250N000013 HC RX MED GY IP 250 OP 250 PS 637: Performed by: STUDENT IN AN ORGANIZED HEALTH CARE EDUCATION/TRAINING PROGRAM

## 2021-05-02 PROCEDURE — 86140 C-REACTIVE PROTEIN: CPT | Performed by: STUDENT IN AN ORGANIZED HEALTH CARE EDUCATION/TRAINING PROGRAM

## 2021-05-02 PROCEDURE — 250N000013 HC RX MED GY IP 250 OP 250 PS 637: Performed by: INTERNAL MEDICINE

## 2021-05-02 PROCEDURE — 120N000002 HC R&B MED SURG/OB UMMC

## 2021-05-02 PROCEDURE — 85027 COMPLETE CBC AUTOMATED: CPT | Performed by: STUDENT IN AN ORGANIZED HEALTH CARE EDUCATION/TRAINING PROGRAM

## 2021-05-02 PROCEDURE — 82565 ASSAY OF CREATININE: CPT | Performed by: STUDENT IN AN ORGANIZED HEALTH CARE EDUCATION/TRAINING PROGRAM

## 2021-05-02 PROCEDURE — 999N001017 HC STATISTIC GLUCOSE BY METER IP

## 2021-05-02 PROCEDURE — 250N000012 HC RX MED GY IP 250 OP 636 PS 637: Performed by: INTERNAL MEDICINE

## 2021-05-02 PROCEDURE — 36415 COLL VENOUS BLD VENIPUNCTURE: CPT | Performed by: STUDENT IN AN ORGANIZED HEALTH CARE EDUCATION/TRAINING PROGRAM

## 2021-05-02 PROCEDURE — 250N000011 HC RX IP 250 OP 636: Performed by: STUDENT IN AN ORGANIZED HEALTH CARE EDUCATION/TRAINING PROGRAM

## 2021-05-02 RX ORDER — PREDNISONE 20 MG/1
40 TABLET ORAL DAILY
Status: DISCONTINUED | OUTPATIENT
Start: 2021-05-02 | End: 2021-05-03 | Stop reason: HOSPADM

## 2021-05-02 RX ORDER — CALCIUM CARBONATE 500 MG/1
1000 TABLET, CHEWABLE ORAL 3 TIMES DAILY PRN
Status: DISCONTINUED | OUTPATIENT
Start: 2021-05-02 | End: 2021-05-03 | Stop reason: HOSPADM

## 2021-05-02 RX ORDER — METFORMIN HCL 500 MG
1000 TABLET, EXTENDED RELEASE 24 HR ORAL
Status: DISCONTINUED | OUTPATIENT
Start: 2021-05-02 | End: 2021-05-03 | Stop reason: HOSPADM

## 2021-05-02 RX ORDER — METFORMIN HCL 500 MG
500 TABLET, EXTENDED RELEASE 24 HR ORAL
Status: DISCONTINUED | OUTPATIENT
Start: 2021-05-02 | End: 2021-05-02

## 2021-05-02 RX ORDER — PANTOPRAZOLE SODIUM 40 MG/1
40 TABLET, DELAYED RELEASE ORAL
Status: DISCONTINUED | OUTPATIENT
Start: 2021-05-03 | End: 2021-05-03 | Stop reason: HOSPADM

## 2021-05-02 RX ADMIN — DICYCLOMINE HYDROCHLORIDE 20 MG: 20 TABLET ORAL at 17:59

## 2021-05-02 RX ADMIN — GABAPENTIN 900 MG: 300 CAPSULE ORAL at 16:37

## 2021-05-02 RX ADMIN — PRAMOXINE HYDROCHLORIDE HYDROCORTISONE ACETATE 1 APPLICATORFUL: 100; 100 AEROSOL, FOAM TOPICAL at 16:39

## 2021-05-02 RX ADMIN — VENLAFAXINE HYDROCHLORIDE 225 MG: 225 TABLET, EXTENDED RELEASE ORAL at 09:55

## 2021-05-02 RX ADMIN — GABAPENTIN 900 MG: 300 CAPSULE ORAL at 12:18

## 2021-05-02 RX ADMIN — METHYLPREDNISOLONE SODIUM SUCCINATE 32 MG: 40 INJECTION, POWDER, LYOPHILIZED, FOR SOLUTION INTRAMUSCULAR; INTRAVENOUS at 09:51

## 2021-05-02 RX ADMIN — ENOXAPARIN SODIUM 40 MG: 100 INJECTION SUBCUTANEOUS at 09:50

## 2021-05-02 RX ADMIN — GABAPENTIN 900 MG: 300 CAPSULE ORAL at 21:05

## 2021-05-02 RX ADMIN — PREDNISONE 40 MG: 20 TABLET ORAL at 12:11

## 2021-05-02 RX ADMIN — AMLODIPINE BESYLATE 5 MG: 5 TABLET ORAL at 09:56

## 2021-05-02 RX ADMIN — INSULIN ASPART 1 UNITS: 100 INJECTION, SOLUTION INTRAVENOUS; SUBCUTANEOUS at 12:19

## 2021-05-02 RX ADMIN — HYDROXYZINE HYDROCHLORIDE 25 MG: 25 TABLET, FILM COATED ORAL at 21:05

## 2021-05-02 RX ADMIN — CALCIUM CARBONATE 600 MG (1,500 MG)-VITAMIN D3 400 UNIT TABLET 1 TABLET: at 09:59

## 2021-05-02 RX ADMIN — DICYCLOMINE HYDROCHLORIDE 20 MG: 20 TABLET ORAL at 09:54

## 2021-05-02 RX ADMIN — PRAMOXINE HYDROCHLORIDE HYDROCORTISONE ACETATE 1 APPLICATORFUL: 100; 100 AEROSOL, FOAM TOPICAL at 21:09

## 2021-05-02 RX ADMIN — Medication 1 TABLET: at 09:57

## 2021-05-02 RX ADMIN — VANCOMYCIN HYDROCHLORIDE 125 MG: 125 CAPSULE ORAL at 09:58

## 2021-05-02 RX ADMIN — ACETAMINOPHEN 975 MG: 325 TABLET, FILM COATED ORAL at 06:23

## 2021-05-02 RX ADMIN — INSULIN ASPART 1 UNITS: 100 INJECTION, SOLUTION INTRAVENOUS; SUBCUTANEOUS at 09:50

## 2021-05-02 RX ADMIN — DICYCLOMINE HYDROCHLORIDE 20 MG: 20 TABLET ORAL at 12:11

## 2021-05-02 RX ADMIN — Medication 5 MG: at 21:05

## 2021-05-02 RX ADMIN — METFORMIN ER 500 MG 1000 MG: 500 TABLET ORAL at 16:37

## 2021-05-02 RX ADMIN — GABAPENTIN 900 MG: 300 CAPSULE ORAL at 09:56

## 2021-05-02 RX ADMIN — INSULIN ASPART 2 UNITS: 100 INJECTION, SOLUTION INTRAVENOUS; SUBCUTANEOUS at 18:00

## 2021-05-02 RX ADMIN — ROSUVASTATIN CALCIUM 10 MG: 10 TABLET, FILM COATED ORAL at 09:57

## 2021-05-02 RX ADMIN — Medication 75 MCG: at 09:55

## 2021-05-02 ASSESSMENT — ACTIVITIES OF DAILY LIVING (ADL)
ADLS_ACUITY_SCORE: 13

## 2021-05-02 NOTE — PROGRESS NOTES
Pt. VSS on RA. Pt. is A/O x4 and able to use call light appropriately. Pt. is ind. in room with toileting and personal hygiene. Pt. denied pain overnight with discomfort reported in toy area. Pt. requested tylenol for 5/10 pain appx. 0630. Atarax and melatonin given at HS. Pt. affect pleasant an cooperative. Pt. started on amlodipine yesterday for HTN. Pt. on regular diet and tolerating well. BG were WNL overnight and no insulin was required. NORA BUCHANAN. Pt. reported feeling better about her hospital course and improvement in symptoms from the last couple days. Enteric precautions maintained.  Plan is to continue decreasing stool frequency and transition to oral steroids prior to discharge; Potential discharge later today

## 2021-05-02 NOTE — PLAN OF CARE
Pt is A/Ox4 and denies pain. Pt VSS exc HTN. High of 157/83. On reg diet. Pt is keeping a stool log that staff is to transfer into epic. Pt has two reported stools so far today. Less bloody than other days per pt. On enteric ISO for hx of c diff. Bg checks ACHS, pt was 149 and 163 so far today, sliding scale insulin utilized as needed. R PIV SL. Plan is to continue to manage UC flare and pt can discharge home once stool is more formed, no blood present in stool and stools are less frequent. Pt started PO steroid today and can possibly discharge home if her s/s are under control.

## 2021-05-02 NOTE — PROGRESS NOTES
[facility]    Medicine Progress Note - Hospitalist Service, Terrie Garza       Date of Admission:  4/26/2021  Assessment & Plan        Doretha Fernandez is a 46 year old female admitted on 4/26/2021. She has a history of metastatic melanoma of unknown primary, seronegative rheumatoid arthritis (on prednisone and tocilizumab), steroid-induced diabetes, C.difficile infection, prior hx of nivolumab-induced colitis in 2018 who is admitted for ulcerative colitis flare. GI is consulted.      Plan for Today:  - transition to oral steroids, 40mg prednisone  - restart metformin  - switch to lactose free diet per GI    IBD flare (ulcerative colitis)  C.difficile colitis  History of nivolumab-induced colitis  Colonic diverticulosis  Patient has a history of colitis after treatment with nivolumab for melanoma in 2018 which was refractory to steroid treatment but improved with infliximab and vedolizumab. Now developed 1 month history of abdominal pain, diarrhea with BRBPR. Tested positive for C.difficile 4/5/2021 and started on PO vancomycin 4/7/2021 with minimal improvement. Had flexible sigmoidoscopy (4/22/2021) showed diffuse inflammation with biopsy result consistent with active chronic colitis, thought to be diagnostic for ulcerative colitis. C.difficile on admission negative.  - Continue vancomycin PO taper, currently on 125 mg daily per last prescription  - enteric stool panel and ova&parasite negative  - Fecal calprotectin elevated to 134  - CMV PCR negative  - Trend CRP, CBC, CMP, CRP has normalized  - Pain control with acetaminophen and tramadol PRN  - GI consult for management of IBD flare  - Proctofoam BID ordered by GI for tenesmus  - Advance diet as tolerated  - Quant Gold and Hep B negative  - Will need to monitor & record stool frequency and % blood  - Stop methylprednisolone 32 mg BID IV   - Start Prednisone 40mg BID  - Continue Bentyl 20 mg   - Plan to discharge tomorrow  if stable on PO steroids. Patient would like to have GI care through the [facility]. Will need follow up to start outpatient entyvio.     Reflux, GERD, due to steroids  - start PO PPI, start Tums for symptoms control    Elevated Blood Pressure  - likely from steroids, if able to switch to PO and start tapering then will hold off on treatment.   - Started amlodipine 5 mg PO every day for HTN     Seronegative RA  Trials of adalimumab and Enbrel were not successful. Has required corticosteroid therapy for the past three years with which she has had significant difficulty in tapering. Does not appears to have acute flare on presentation.  - Hold PTA prednisone  - Patient due for Tocilizumab (last dose 4/11), will hold-off for now with steroid treatment. Consider discuss with rheumatology on discharge for Tocilizumab vs Vedolizumab     Steroid-induced DM  On chronic steroid treatment for ICI-induced colitis and seronegative RA. Anticipate hyperglycemia now that high-dose steroid initiated for treatment of IBD flare.  - BS q4h, target 140-180 mg/dL. Hypoglycemia protocol.  - Low sliding scale insulin while NPO  - Restart metformin 1000 mg daily    Leukocytosis  - from steroids     SANDRA/MDD  -Continue PTA Venlafaxine.     STORMY  Morbid obesity  - CPAP home setting     RLS:   - Continue with PTA Gabapentin 900 mg QID     Metastatic melanoma  Diagnosed on lymph node biopsy in Oct 2017, unclear primary. Treated with nivolumab and developed nivolumab-induced colitis in May 2018 treated as above. Now in complete remission and received no treatment for the past 2 years.    Malnutrition:    - Level of malnutrition: Non-Severe   - Based on: weight loss, reduced intake       Diet: Regular Diet Adult    DVT Prophylaxis: Enoxaparin (Lovenox) SQ  Major Catheter: not present  Code Status: Full Code           Disposition Plan   Expected discharge: Tomorrow, recommended to prior living arrangement once adequate pain management/  tolerating PO medications.  Entered: Avelino Peralta MD 05/02/2021, 7:55 AM       The patient's care was discussed with the Bedside Nurse and Patient.    Avelino Peralta MD  Hospitalist Service, [address]  Contact information available via [facility] Paging/Directory  Please see sign in/sign out for up to date coverage information    Time Spent on this Encounter   I spent 40 minutes on the unit/floor managing the care of Doretha Fernandez. Over 50% of my time was spent on the following:   - Counseling the patient and/or family regarding: diagnostic results, prognosis and risks and benefits of treatment options  - Coordination of care with the: consultant(s)    - coordination with GI  - discussion with patient the importance of continued care, safe discharge. Discussion of GERD, steroid side effects and treatment options. All questions were answered    Avelino Peralta MD    ______________________________________________________________________    Interval History   5 bowel movements yesterday, patient is complaining of reflux pain today with epigastric pain and sour taste in her mouth. Would like tums.  Improved BM, less blood. Would like to go home, but is ok with plan for discharge tomorrow if stable on PO steroids.    Otherwise 4pt ROS is negative    Data reviewed today: I reviewed all medications, new labs and imaging results over the last 24 hours. I personally reviewed no images or EKG's today.    Physical Exam   Vital Signs: Temp: 97.7  F (36.5  C) Temp src: Oral BP: (!) 144/62 Pulse: 67   Resp: 18 SpO2: 93 % O2 Device: None (Room air)    Weight: 248 lbs 3.2 oz  Gen: NAD, sitting comfortably in bed  Eyes: PERRLA, EOMI, conjuctiva clear  Mouth: OP clear, no lesions  Neck: No cervical LAD  CV: RRR, no murmurs, 2+ radial pulses  RESP: CTA bilaterally, no w/r/c  Abd: soft, nontender, nondistended  Ext: no edema bilaterally  Neuro: CNII-CNXII intact      Data    Recent Labs   Lab 05/01/21  1302 04/30/21  1705 04/29/21  0826 04/28/21  1006   WBC 20.0* 19.3*  --  15.7*   HGB 12.9 13.3  --  11.7   MCV 94 93  --  93    307 268 293    137 140 136   POTASSIUM 4.2 3.8 4.2 3.9   CHLORIDE 105 105 107 106   CO2 26 26 25 24   BUN 23 22 20 16   CR 0.68 0.66 0.67 0.74   ANIONGAP 6 6 8 6   PATRICIA 8.9 9.4 9.0 8.9   * 196* 114* 286*   ALBUMIN 3.3* 3.7 3.5 3.5   PROTTOTAL 6.4* 7.2 6.7* 6.8   BILITOTAL 0.2 0.1* 0.2 0.2   ALKPHOS 53 60 52 48   * 158* 58* 37   AST 36 72* 27 12

## 2021-05-02 NOTE — PROGRESS NOTES
GASTROENTEROLOGY PROGRESS NOTE    Date: 05/02/2021     Assessment:  A 45 yo F with significant PMH of melanoma,  h/o nivolumab induced colitis treated with Remicade and Entyvio now in remission, h/o giardia infection, h/o C.diff infection 8 years ago, seronegative RA on prednisone & tocilizumab, recently strep throat treated with penicillin c/b C.diff and started PO Vancomycin 4/7 without improvement, now with diarrhea, bloody stools, and new diagnosis of UC proctosigmoiditis.     # Acute flare of ulcerative proctosigmoiditis  # H/o checkpoint inhibitor induced colitis s/p Remicade  # RA on tocilizumab & Prednisone  #Tenesmus, bloody diarrhea  - D 11  - BM in the last 24 hours: 4 BM, all are formed and have blood, CRP <2.9  - Able to tolerate diet, mild abdominal cramp, no abdominal tenderness  - Methyl prednisone 32 mg BID, D 6  - Had melanoma, not a candidate for Anti-TNF.  - Considering salvage therapy with cyclosporine/Colectomy if no improvement on steroids   - Dr. Boudreaux has been in communication with her [place] care team, who are in agreement with reinitiating vedolizumab in outpatient setting. She will continue to follow at [place] to keep all her GI in one healthcare system.     Recommendations  - Switching to oral prednisone 40 mg daily.  - Lactose free diet    - Continue daily CRP  - Continue to document number of stools daily and the % containing blood  - Continue bentyl and proctofoam to address tenesmus  - Continue DVT prophylaxis    -Plan for follow-up with [place] [facility], but she will let us know if she wants to transfer care here     Thank you for involving us in this patient's care. Please do not hesitate to contact the GI service with any questions or concerns.      Pt care plan discussed with CAMI Olivarez, GI staff physician.    Kathy Eduardo MD  Gastroenterology Fellow  Division of Gastroenterology, Hepatology and Nutrition  [facility]  "[place]    _______________________________________________________________    24 Hour Events:  4 formed BM with blood     Subjective:  Mild abdominal cramps. Able to tolerate diet.     Objective:  Blood pressure (!) 157/83, pulse 66, temperature 98.8  F (37.1  C), temperature source Oral, resp. rate 18, height 1.6 m (5' 3\"), weight 112.6 kg (248 lb 3.2 oz), SpO2 95 %, not currently breastfeeding.    Gen: A&Ox3, NAD  HEENT: ncat, perrl, eomi, sclera anicteric  CV: RRR  Lungs: CTA b/l  Abd:  +bs, soft, nd/nt  Skin: no jaundice, no stigmata of chronic liver disease  MS: appropriate muscle mass for age  Neuro: non focal       LABS:  BMP  Recent Labs   Lab 05/02/21  0836 05/01/21  1302 04/30/21  1705 04/29/21  0826 04/28/21  1006   NA  --  136 137 140 136   POTASSIUM  --  4.2 3.8 4.2 3.9   CHLORIDE  --  105 105 107 106   PATRICIA  --  8.9 9.4 9.0 8.9   CO2  --  26 26 25 24   BUN  --  23 22 20 16   CR 0.70 0.68 0.66 0.67 0.74   GLC  --  319* 196* 114* 286*     CBC  Recent Labs   Lab 05/02/21  0836 05/01/21  1302 04/30/21  1705 04/29/21  0826 04/28/21  1006   WBC 20.7* 20.0* 19.3*  --  15.7*   RBC 4.57 4.23 4.32  --  3.90   HGB 14.1 12.9 13.3  --  11.7   HCT 42.5 39.6 40.3  --  36.4   MCV 93 94 93  --  93   MCH 30.9 30.5 30.8  --  30.0   MCHC 33.2 32.6 33.0  --  32.1   RDW 12.5 12.8 12.4  --  12.7    305 307 268 293     INRNo lab results found in last 7 days.  LFTs  Recent Labs   Lab 05/01/21  1302 04/30/21  1705 04/29/21  0826 04/28/21  1006   ALKPHOS 53 60 52 48   AST 36 72* 27 12   * 158* 58* 37   BILITOTAL 0.2 0.1* 0.2 0.2   PROTTOTAL 6.4* 7.2 6.7* 6.8   ALBUMIN 3.3* 3.7 3.5 3.5      PANCNo lab results found in last 7 days.    IMAGING:    "

## 2021-05-03 ENCOUNTER — PATIENT OUTREACH (OUTPATIENT)
Dept: CARE COORDINATION | Facility: CLINIC | Age: 46
End: 2021-05-03

## 2021-05-03 VITALS
DIASTOLIC BLOOD PRESSURE: 59 MMHG | WEIGHT: 248.2 LBS | HEART RATE: 83 BPM | BODY MASS INDEX: 43.98 KG/M2 | TEMPERATURE: 97.8 F | RESPIRATION RATE: 18 BRPM | OXYGEN SATURATION: 96 % | HEIGHT: 63 IN | SYSTOLIC BLOOD PRESSURE: 113 MMHG

## 2021-05-03 LAB
ERYTHROCYTE [DISTWIDTH] IN BLOOD BY AUTOMATED COUNT: 12.7 % (ref 10–15)
GLUCOSE BLDC GLUCOMTR-MCNC: 130 MG/DL (ref 70–99)
GLUCOSE BLDC GLUCOMTR-MCNC: 178 MG/DL (ref 70–99)
GLUCOSE BLDC GLUCOMTR-MCNC: 235 MG/DL (ref 70–99)
HCT VFR BLD AUTO: 41.1 % (ref 35–47)
HGB BLD-MCNC: 13.7 G/DL (ref 11.7–15.7)
MCH RBC QN AUTO: 31.6 PG (ref 26.5–33)
MCHC RBC AUTO-ENTMCNC: 33.3 G/DL (ref 31.5–36.5)
MCV RBC AUTO: 95 FL (ref 78–100)
PLATELET # BLD AUTO: 307 10E9/L (ref 150–450)
RBC # BLD AUTO: 4.34 10E12/L (ref 3.8–5.2)
WBC # BLD AUTO: 20.3 10E9/L (ref 4–11)

## 2021-05-03 PROCEDURE — 99232 SBSQ HOSP IP/OBS MODERATE 35: CPT | Performed by: INTERNAL MEDICINE

## 2021-05-03 PROCEDURE — 250N000013 HC RX MED GY IP 250 OP 250 PS 637: Performed by: STUDENT IN AN ORGANIZED HEALTH CARE EDUCATION/TRAINING PROGRAM

## 2021-05-03 PROCEDURE — 250N000011 HC RX IP 250 OP 636: Performed by: STUDENT IN AN ORGANIZED HEALTH CARE EDUCATION/TRAINING PROGRAM

## 2021-05-03 PROCEDURE — 99239 HOSP IP/OBS DSCHRG MGMT >30: CPT | Mod: GC | Performed by: INTERNAL MEDICINE

## 2021-05-03 PROCEDURE — 250N000013 HC RX MED GY IP 250 OP 250 PS 637

## 2021-05-03 PROCEDURE — 999N001017 HC STATISTIC GLUCOSE BY METER IP

## 2021-05-03 PROCEDURE — 85027 COMPLETE CBC AUTOMATED: CPT | Performed by: STUDENT IN AN ORGANIZED HEALTH CARE EDUCATION/TRAINING PROGRAM

## 2021-05-03 PROCEDURE — 250N000013 HC RX MED GY IP 250 OP 250 PS 637: Performed by: INTERNAL MEDICINE

## 2021-05-03 PROCEDURE — 250N000012 HC RX MED GY IP 250 OP 636 PS 637: Performed by: INTERNAL MEDICINE

## 2021-05-03 PROCEDURE — 36415 COLL VENOUS BLD VENIPUNCTURE: CPT | Performed by: STUDENT IN AN ORGANIZED HEALTH CARE EDUCATION/TRAINING PROGRAM

## 2021-05-03 RX ORDER — DICYCLOMINE HCL 20 MG
20 TABLET ORAL
Qty: 30 TABLET | Refills: 0 | Status: ON HOLD | OUTPATIENT
Start: 2021-05-03 | End: 2021-06-18

## 2021-05-03 RX ORDER — CALCIUM CARBONATE 500 MG/1
1 TABLET, CHEWABLE ORAL 3 TIMES DAILY PRN
Qty: 30 TABLET | Refills: 0 | Status: SHIPPED | OUTPATIENT
Start: 2021-05-03 | End: 2021-07-01

## 2021-05-03 RX ORDER — PANTOPRAZOLE SODIUM 40 MG/1
40 TABLET, DELAYED RELEASE ORAL
Qty: 30 TABLET | Refills: 0 | Status: ON HOLD | OUTPATIENT
Start: 2021-05-04 | End: 2021-06-04

## 2021-05-03 RX ORDER — SULFAMETHOXAZOLE/TRIMETHOPRIM 800-160 MG
1 TABLET ORAL DAILY
Qty: 45 TABLET | Refills: 0 | Status: ON HOLD | OUTPATIENT
Start: 2021-05-03 | End: 2021-06-09

## 2021-05-03 RX ORDER — AMLODIPINE BESYLATE 5 MG/1
5 TABLET ORAL DAILY
Qty: 30 TABLET | Refills: 0 | Status: ON HOLD | OUTPATIENT
Start: 2021-05-04 | End: 2021-06-04

## 2021-05-03 RX ORDER — FAMOTIDINE 10 MG
10 TABLET ORAL 2 TIMES DAILY
Status: DISCONTINUED | OUTPATIENT
Start: 2021-05-03 | End: 2021-05-03 | Stop reason: HOSPADM

## 2021-05-03 RX ORDER — FAMOTIDINE 10 MG
10 TABLET ORAL 2 TIMES DAILY
Qty: 30 TABLET | Refills: 0 | Status: SHIPPED | OUTPATIENT
Start: 2021-05-03 | End: 2021-06-03

## 2021-05-03 RX ORDER — PREDNISONE 5 MG/1
40 TABLET ORAL DAILY
Qty: 63 TABLET | Refills: 0 | Status: ON HOLD | OUTPATIENT
Start: 2021-05-03 | End: 2021-05-11

## 2021-05-03 RX ADMIN — GABAPENTIN 900 MG: 300 CAPSULE ORAL at 07:47

## 2021-05-03 RX ADMIN — VENLAFAXINE HYDROCHLORIDE 225 MG: 225 TABLET, EXTENDED RELEASE ORAL at 07:48

## 2021-05-03 RX ADMIN — VANCOMYCIN HYDROCHLORIDE 125 MG: 125 CAPSULE ORAL at 09:55

## 2021-05-03 RX ADMIN — ENOXAPARIN SODIUM 40 MG: 100 INJECTION SUBCUTANEOUS at 07:46

## 2021-05-03 RX ADMIN — PREDNISONE 40 MG: 20 TABLET ORAL at 07:47

## 2021-05-03 RX ADMIN — Medication 1 TABLET: at 07:48

## 2021-05-03 RX ADMIN — FAMOTIDINE 10 MG: 10 TABLET, FILM COATED ORAL at 14:04

## 2021-05-03 RX ADMIN — CALCIUM CARBONATE 600 MG (1,500 MG)-VITAMIN D3 400 UNIT TABLET 1 TABLET: at 07:47

## 2021-05-03 RX ADMIN — PRAMOXINE HYDROCHLORIDE HYDROCORTISONE ACETATE 1 APPLICATORFUL: 100; 100 AEROSOL, FOAM TOPICAL at 07:51

## 2021-05-03 RX ADMIN — Medication 75 MCG: at 07:47

## 2021-05-03 RX ADMIN — DICYCLOMINE HYDROCHLORIDE 20 MG: 20 TABLET ORAL at 07:48

## 2021-05-03 RX ADMIN — PANTOPRAZOLE SODIUM 40 MG: 40 TABLET, DELAYED RELEASE ORAL at 07:48

## 2021-05-03 RX ADMIN — DICYCLOMINE HYDROCHLORIDE 20 MG: 20 TABLET ORAL at 11:41

## 2021-05-03 RX ADMIN — ROSUVASTATIN CALCIUM 10 MG: 10 TABLET, FILM COATED ORAL at 07:48

## 2021-05-03 RX ADMIN — GABAPENTIN 900 MG: 300 CAPSULE ORAL at 11:41

## 2021-05-03 RX ADMIN — INSULIN ASPART 1 UNITS: 100 INJECTION, SOLUTION INTRAVENOUS; SUBCUTANEOUS at 11:44

## 2021-05-03 RX ADMIN — AMLODIPINE BESYLATE 5 MG: 5 TABLET ORAL at 07:48

## 2021-05-03 ASSESSMENT — ACTIVITIES OF DAILY LIVING (ADL)
ADLS_ACUITY_SCORE: 13

## 2021-05-03 NOTE — PROGRESS NOTES
BRIEF SOCIAL WORK NOTE     SW received report from provider during rounds that pt will need a parking pass. Pt has been hospitalized for 7 days and pt drove herself to [facility]. SW provided 1 free parking pass in order for pt to discharge from hospital and return home.    GEORGIE Nash, [facility]  5A Acute Care   PH: [phone]  Pager: [phone]  [facility]

## 2021-05-03 NOTE — PLAN OF CARE
Independent in room, lactose free diet - good intake. Denies pain just generalized exhaustion from hospitalization. 2 BM this shift. Pt stating 50/50 stool and blood - team aware. A/O.     Pt adequate for discharge, ok with Team and GI. PIV removed, belongings collected, AVS reviewed. Pt waiting for scripts to be signed and filled. Pt will drive self home.

## 2021-05-03 NOTE — PROGRESS NOTES
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  Background includes Melanoma, hx nivoloumab induced colitis with prior Remicade and Entyvio treatment, history of C. difficile, history of seronegative RA.  She is on oral steroids at this point, appears to be fairly stable and we are cautiously optimistic for continued improvement    RECOMMENDATIONS:  ---Suggest continuing prednisone 40 mg for 2 weeks, then 30 mg for 1 week, then 25 mg for 1 week, then 20 mg for 1 week, then 15 mg for 1 week, then 10 mg for 1 week, then 5 mg for 1 week then off  ---Suggest 1 Bactrim DS tab until prednisone dosage is less than 20 mg/day.  ---Follow-up in the GI clinic and initiate vedolizumab as soon as possible.  --- Return to the hospital if increasing stool frequency, increased rectal bleeding, increased abdominal pain or other concerns.  --- Agree with holding tocilizumab.  If colitis worsens after this is reinitiated, then also consider this is a possibility for an exacerbating medication.      Gastroenterology follow up recommendations: Pending clinical course.      Thank you for involving us in this patient's care. Please do not hesitate to contact the GI service with any questions or concerns.        Alex New MD  Gastroenterology Staff  Division of Gastroenterology, Hepatology and Nutrition  [facility]    _______________________________________________________________    24 Hour Events:  none    Subjective:  4 bowel movements, more formed but still with some blood.  Minimal abdominal pain.  Otherwise feeling well today.  Wants to discharge given that she is feeling well.  Interested in transferring care to [facility].    Objective:  Blood pressure 113/59, pulse 83, temperature 97.8  F (36.6  C), temperature source Oral, resp. rate 18, height 1.6 m (5' 3\"), weight 112.6 kg " (248 lb 3.2 oz), SpO2 96 %, not currently breastfeeding.    Gen: A&Ox3, NAD  HEENT: ncat, perrl, eomi, sclera anicteric  CV: RRR  Lungs: CTA b/l  Abd: obese, +bs, soft, nd/nt  Skin: no jaundice, no stigmata of chronic liver disease  MS: appropriate muscle mass for age  Neuro: non focal       LABS:  Reviewed.    IMAGING:  none

## 2021-05-03 NOTE — PROGRESS NOTES
Temp:  [97.6  F (36.4  C)-98.8  F (37.1  C)] 97.6  F (36.4  C)  Pulse:  [66-76] 76  Resp:  [18-20] 20  BP: (144-157)/(62-83) 149/67  SpO2:  [92 %-95 %] 92 %   Pt. is A/O and independent in room. VSS ex. HTN on RA. Pt. on regular diet with good appetite. Enteric precautions maintained. BG  checks were 300, 178. Sliding scale insulin given at HS. Pt. had questions about discharge diabetes medications and instructions. General education given on insulin administration to pt in event of needing novolog/sliding scale at home. Pt. also given instruction on tocilizumab injection per her questions about subcutaneous injections.  Pt. has stool log kept at bedside for monitoring. Pt. had 4 BMs yesterday. Pt. has had one BM today (5/3/21).  Goal is for patient to have less bloody and frequent stools daily which has been improving since admission.  Plan is for patient to continue on oral steroids and potentially discharge later today.

## 2021-05-04 DIAGNOSIS — Z71.89 OTHER SPECIFIED COUNSELING: ICD-10-CM

## 2021-05-04 DIAGNOSIS — G89.29 OTHER CHRONIC PAIN: ICD-10-CM

## 2021-05-04 NOTE — PROGRESS NOTES
[facility]: Post-Discharge Note  SITUATION                                                      Admission:    Admission Date: 04/27/21   Reason for Admission: Ulcerative colitis with complication, unspecified location  Discharge:   Discharge Date: 05/03/21  Discharge Diagnosis: Ulcerative colitis with complication, unspecified location    BACKGROUND                                                      Doretha Fernandez is a 46 year old female who has a history of metastatic melanoma of unknown primary, seronegative rheumatoid arthritis (on prednisone and tocilizumab), steroid-induced diabetes, C.difficile infection, prior hx of nivolumab-induced colitis in 2018, and prothrombin deficiency; who is admitted for ulcerative colitis flare.       ASSESSMENT      Discharge Assessment  Patient reports symptoms are: Improved  Does the patient have all of their medications?: Yes  Does patient know what their new medications are for?: Yes  Does patient have a follow-up appointment scheduled?: Yes  Does patient have any other questions or concerns?: No    Post-op  Did the patient have surgery or a procedure: No  Fever: No  Chills: No  Eating & Drinking: eating and drinking without complaints/concerns  Bowel Function: normal  Urinary Status: voiding without complaint/concerns        PLAN                                                      Outpatient Plan:  Follow up with primary care provider, Anna Naidu, within 7-14 days to follow up blood pressure and diabetes.  Check blood sugar at this visit and might consider increased metformin dose  Follow up with your rheumatologist to discuss tocilizumab which we recommend holding while on steroids  Follow up with Gastroenterology, at [facility], within 2 weeks to consider steroid sparing agents    Future Appointments   Date Time Provider Department Center   8/2/2021 10:00 AM Lowell Bullock MD [facility]

## 2021-05-04 NOTE — DISCHARGE SUMMARY
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  Check blood sugar at this visit   and might consider increased metformin dose   Follow up with your rheumatologist to discuss tocilizumab which we   recommend holding while on steroids  Follow up with Gastroenterology, at [facility], within 2   weeks to consider steroid sparing agents    Appointments on [place] and/or [facility] (with [facility] or [facility]   provider or service). Call [phone] if you haven't heard regarding   these appointments within 7 days of discharge.           Discharge Disposition   Discharged to home  Condition at discharge: Satisfactory    Hospital Course   Doretha Fernandez was admitted on 4/26/2021 for colitis flare.  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  Tested positive for C.difficile 4/5/2021 and started on PO vancomycin 4/7/2021 with minimal improvement. Had flexible sigmoidoscopy (4/22/2021) showed diffuse inflammation with biopsy result consistent with active chronic colitis, thought to be diagnostic for ulcerative colitis. C.difficile on admission negative.  Patient improved with IV steroids and transitioned to PO prior to discharge with plan for close follow up with GI.     - Continue PO vancomycin taper dosing   - Prednisone 40 mg for 2 weeks, then 30 mg for 1  week, then 25 mg for 1 week, then 20 mg for 1 week, then 15 mg for 1 week, then 10 mg for 1 week, then 5 mg for 1 week then off   **Called patient and updated with slight adjustment to steroid taper from what was ordered**  - Bactrim DS tab Qday until prednisone dosage is less than 20 mg/day.  - Follow-up in the GI clinic and initiate vedolizumab as soon as possible   GI to coordinate  - Return to the hospital if increasing stool frequency, increased rectal bleeding, increased abdominal pain or other concerns.  - Holding tocilizumab.  If colitis worsens after this is reinitiated, then also consider this is a possibility for an exacerbating medication   - Follow up with rheumatologist     Seronegative RA:   - Hold tocilizumab pending GI and rheum follow up     T2DM:   - Continue metformin  - Titrate dose with close PCP follow up     HTN:   - Started on amlodipine   - Follow up with PCP    Consultations This Hospital Stay   GI LUMINAL ADULT IP CONSULT  VASCULAR ACCESS CARE ADULT IP CONSULT    Code Status   Full Code       The patient was discussed with Dr. Kathryn Abbott MD  [address] [address]  Phone: [phone]  ______________________________________________________________________    Physical Exam   Vital Signs: Temp: 97.8  F (36.6  C) Temp src: Oral BP: 113/59 Pulse: 83   Resp: 18 SpO2: 96 % O2 Device: None (Room air)    Weight: 248 lbs 3.2 oz  General Appearance: Pleasant adult female, supine in bed.  NAD  Respiratory: Normal WOB on RA  Cardiovascular: RRR, intact distal perfusion   GI: Soft, NT, ND  Skin: WPP, no lesions/rashes  Other: Alert and oriented, grossly nonfocal      Primary Care Physician   Anna Naidu    Discharge Orders      Reason for your hospital stay    You were hospitalized with colitis.     Adult [facility] Follow-up and recommended labs and tests    Follow up with primary care provider, Anna Naidu,  within 7-14  days to follow up blood pressure and diabetes.  Check blood sugar at this visit and might consider increased metformin dose   Follow up with your rheumatologist to discuss tocilizumab which we recommend holding while on steroids  Follow up with Gastroenterology, at [facility], within 2 weeks to consider steroid sparing agents    Appointments on [place] and/or [facility] (with [facility] or [facility] provider or service). Call [phone] if you haven't heard regarding these appointments within 7 days of discharge.     Activity    Your activity upon discharge: activity as tolerated     When to contact your care team    We will send you home with a course of steroids.  You should continue to hold your tocilizumab until you follow up with Rheumatology.  GI will arrange close follow up here for you in their clinic.  And you should see your PCP in the next couple weeks to discuss blood pressure and blood sugar management.  In the mean time continue lactose free diet.  If you have increased bloody bowel movements, fever, or abdominal pain please contact your care team or return to the ER for evaluation.     Full Code     Diet    Follow this diet upon discharge: Orders Placed This Encounter      No Lactose Diet       Significant Results and Procedures   Results for orders placed or performed during the hospital encounter of 04/26/21   CT Abdomen Pelvis w Contrast    Narrative    EXAMINATION: CT ABDOMEN PELVIS W CONTRAST, 4/26/2021 9:07 PM    TECHNIQUE:  Helical CT images from the lung bases through the  symphysis pubis were obtained with contrast.  Coronal reformatted  images were generated at a workstation for further assessment.    CONTRAST:  135 cc Isovue 370 IV.    COMPARISON: CT 4/13/2021    HISTORY: recent c diff infection, brand new diagnosis of ulcerative  colitis after flex sig done by [place], severe L sided abd pain with  bloody diarrhea, eval for colitis/megacolon/intraabdominal  abscess    FINDINGS:    Abdomen and pelvis:   Liver: No suspicious liver lesions. Portal veins appear patent.  Gallbladder: No gallstones. No evidence of acute cholecystitis.  Spleen: Normal size.  Pancreas: Near-complete fatty atrophy.  Adrenal glands: No adrenal nodules.  Kidneys: No kidney masses. No hydronephrosis or obstructing renal  stones.  Bladder / Pelvic organs: Prominent bilateral ovaries. Lateral aspect  of the right ovary is either heterogeneously enhancing or a  hemorrhagic cyst. Unchanged in size since prior studies.  Bowel: Colonic diverticulosis. The sigmoid colon is redundant. No  evidence for acute inflammatory changes. No bowel wall thickening. The  appendix is not identified, however there are no inflammatory changes  of the right lower quadrant. The small and large bowel are normal in  caliber.  Lymph nodes: No retroperitoneal, mesenteric, or pelvic  lymphadenopathy.  Fluid: No free fluid within the abdomen.  Vessels: No infrarenal aortic aneurysm.     Lung bases: Bibasilar atelectasis. The heart is not enlarged.    Bones and soft tissues: Mild degenerative changes of the spine. No  suspicious osseous lesions. Small fat-containing periumbilical hernia.      Impression    IMPRESSION:   1.  No evidence for active colitis.  2.  Colonic diverticulosis without evidence for diverticulitis.    I have personally reviewed the examination and initial interpretation  and I agree with the findings.    ASIA ARAGON MD       Discharge Medications   Discharge Medication List as of 5/3/2021  2:44 PM      START taking these medications    Details   amLODIPine (NORVASC) 5 MG tablet Take 1 tablet (5 mg) by mouth daily, Disp-30 tablet, R-0, Local Print      calcium carbonate (TUMS) 500 MG chewable tablet Take 1 tablet (500 mg) by mouth 3 times daily as needed for heartburn, Disp-30 tablet, R-0, Local Print      dicyclomine (BENTYL) 20 MG tablet Take 1 tablet (20 mg) by mouth 3 times daily (with meals), Disp-30  tablet, R-0, Local Print      famotidine (PEPCID) 10 MG tablet Take 1 tablet (10 mg) by mouth 2 times daily, Disp-30 tablet, R-0, Local Print      hydrocortisone-pramoxine (PROCTOFOAM-HC) rectal foam Place 1 Applicatorful rectally 2 times dailyDisp-60 g, R-0Local Print      pantoprazole (PROTONIX) 40 MG EC tablet Take 1 tablet (40 mg) by mouth every morning (before breakfast), Disp-30 tablet, R-0, Local Print      sulfamethoxazole-trimethoprim (BACTRIM DS) 800-160 MG tablet Take 1 tablet by mouth daily, Disp-45 tablet, R-0, Local PrintContinue until off steroids         CONTINUE these medications which have CHANGED    Details   predniSONE (DELTASONE) 5 MG tablet Take 8 tablets (40 mg) by mouth daily, Disp-63 tablet, R-0, Local [id number] daily for 2weeks then 30mg daily for 2 weeks, then 20mg daily for 2 weeks         CONTINUE these medications which have NOT CHANGED    Details   ACCU-CHEK GUIDE test strip USE TO TEST BLOOD SUGAR TWO TIMES A DAY OR AS DIRECTED, Disp-200 strip, R-0, E-Prescribe      acetaminophen (TYLENOL) 500 MG tablet Take 1,000 mg by mouth every 8 hours as needed for mild pain, Historical      blood glucose monitoring (ACCU-CHEK FASTCLIX) lancets USE TWO TIMES A DAY OR AS DIRECTED, Disp-102 each, R-9, E-Prescribe      Calcium Carb-Cholecalciferol 600-800 MG-UNIT TABS Take 800 mg by mouth daily before breakfast, Historical      ferrous fumarate 65 mg, Jamul. FE,-Vitamin C 125 mg (VITRON-C)  MG TABS tablet Take 1 tablet by mouth daily, Disp-30 tablet, R-1, E-PrescribeDUE TO BE SEEN      gabapentin (NEURONTIN) 300 MG capsule TAKE THREE CAPSULES BY MOUTH FOUR TIMES A DAY, Disp-360 capsule, R-0, E-Prescribe      hydrOXYzine (ATARAX) 25 MG tablet TAKE ONE TABLET BY MOUTH AT BEDTIME, Disp-90 tablet, R-1, E-Prescribe      metFORMIN (GLUCOPHAGE-XR) 500 MG 24 hr tablet Take 1,000 mg by mouth Daily with breakfast., Historical      ondansetron (ZOFRAN) 8 MG tablet Take 8 mg by mouth every 8 hours as  needed for nausea (Pt has not taken in past year 2/10/20) , Historical      rosuvastatin (CRESTOR) 10 MG tablet Take 1 tablet (10 mg) by mouth daily, Disp-90 tablet, R-3, E-Prescribe      vancomycin (VANCOCIN) 125 MG capsule Take 1 capsule (125 mg) by mouth 4 times daily for 3 days, THEN 1 capsule (125 mg) 2 times daily for 7 days, THEN 1 capsule (125 mg) daily for 7 days, THEN 1 capsule (125 mg) every other day for 14 days., Disp-40 capsule, R-0, E-Prescribe      venlafaxine (EFFEXOR-ER) 225 MG 24 hr tablet TAKE ONE TABLET BY MOUTH ONCE DAILY, Disp-90 tablet, R-1, E-Prescribe      VITAMIN D3 25 MCG (1000 UT) tablet TAKE THREE TABLETS BY MOUTH ONCE DAILY, Disp-300 tablet, R-1, E-Prescribe         STOP taking these medications       tocilizumab (ACTEMRA) 162 MG/0.9ML subcutaneous injection Comments:   Reason for Stopping:             Allergies   Allergies   Allergen Reactions     Bee Venom Swelling     Azithromycin Diarrhea     Erythromycin      Other reaction(s): GI intolerance, Vomiting     Fentanyl Other (See Comments)     sweating  sweating     Prochlorperazine Fatigue     Other reaction(s): Other (see comments)  Fatigue     Buspirone      Other reaction(s): GI intolerance  vomiting     Erythrocin Nausea and Vomiting     Zithromax [Azithromycin Dihydrate] Diarrhea     Enbrel [Etanercept] Hives and Rash

## 2021-05-05 ENCOUNTER — PATIENT OUTREACH (OUTPATIENT)
Dept: GASTROENTEROLOGY | Facility: CLINIC | Age: 46
End: 2021-05-05

## 2021-05-05 ENCOUNTER — PATIENT OUTREACH (OUTPATIENT)
Dept: NURSING | Facility: CLINIC | Age: 46
End: 2021-05-05
Payer: COMMERCIAL

## 2021-05-05 DIAGNOSIS — K51.919 ULCERATIVE COLITIS WITH COMPLICATION, UNSPECIFIED LOCATION (H): Primary | ICD-10-CM

## 2021-05-05 RX ORDER — GABAPENTIN 300 MG/1
CAPSULE ORAL
Qty: 360 CAPSULE | Refills: 0 | Status: SHIPPED | OUTPATIENT
Start: 2021-05-05 | End: 2021-06-23

## 2021-05-05 NOTE — CONFIDENTIAL NOTE
This patient is going to be discharging.  Can we please set up an clinic follow-up with Dr. Boudreaux, and also she will need initiation of vedolizumab.  It sounds like she wants to follow primarily with our system now so getting this done as soon as possible will be best.  Please let me know if any questions.       Alex New MD     Contacted patient to discuss follow after hospitalization      Vedo therapy plan entered\\   Standing lab orders   Patient would like to infuse at [facility]

## 2021-05-05 NOTE — TELEPHONE ENCOUNTER
REFERRAL INFORMATION:    Referring Provider:  Dr. New     Referring Clinic:  N/A    Reason for Visit/Diagnosis: Post Hospital      FUTURE VISIT INFORMATION:    Appointment Date: 5/12/2021    Appointment Time: 8:20 AM      NOTES STATUS DETAILS   OFFICE NOTE from Referring Provider N/A    OFFICE NOTE from Other Specialist Care Everywhere 4/22/2021 Office visit with Dr. Halley Keenan ([place])    9/23/2020 Office visit with Dr. Myron Harris ([facility])     3/19/19, 9/12/18, 7/18/18 Office visit with Shayla Schoenoff, PA-C ([place])     5/24/18 Office visit with Dr. Rosibel Ochoa ([place])     4/10/18 Office visit with Dr. Davey ([facility])       HOSPITAL DISCHARGE SUMMARY/  ED VISITS Internal 4/26/2021, 4/13/2021, 3/8/18 ([facility])   9/6/2020 ([facility])      OPERATIVE REPORT Received/ Care Everywhere Sigmoidoscopy: 4/11/18 ([facility])  Sigmoidoscopy: 4/22/2021, 2/5/19 ([place])   MEDICATION LIST Internal         ENDOSCOPY  Care Everywhere EGD: 2/5/19   COLONOSCOPY Internal/ Care Everywhere 3/8/18, 10/18/17  9/12/18 ([place])    ERCP N/A    EUS N/A    STOOL TESTING Internal/ Care Everywhere 4/26/2021, 9/23/2020   PERTINENT LABS Internal/ Care Everywhere    PATHOLOGY REPORTS (RELATED) Care Everywhere    IMAGING (CT, MRI, EGD, MRCP, Small Bowel Follow Through/SBT, MR/CT Enterography) Internal CT Abdomen Pelvis: 4/26/2021, 4/13/2021, 9/6/2020, 12/16/19

## 2021-05-06 ENCOUNTER — HOSPITAL ENCOUNTER (INPATIENT)
Facility: CLINIC | Age: 46
LOS: 5 days | Discharge: HOME HEALTH | DRG: 386 | End: 2021-05-11
Attending: EMERGENCY MEDICINE | Admitting: INTERNAL MEDICINE
Payer: COMMERCIAL

## 2021-05-06 ENCOUNTER — APPOINTMENT (OUTPATIENT)
Dept: CT IMAGING | Facility: CLINIC | Age: 46
DRG: 386 | End: 2021-05-06
Attending: EMERGENCY MEDICINE
Payer: COMMERCIAL

## 2021-05-06 ENCOUNTER — APPOINTMENT (OUTPATIENT)
Dept: CT IMAGING | Facility: CLINIC | Age: 46
DRG: 386 | End: 2021-05-06
Attending: NURSE PRACTITIONER
Payer: COMMERCIAL

## 2021-05-06 DIAGNOSIS — E66.01 MORBID OBESITY (H): Chronic | ICD-10-CM

## 2021-05-06 DIAGNOSIS — K52.9 COLITIS: ICD-10-CM

## 2021-05-06 DIAGNOSIS — Z20.822 CONTACT WITH AND (SUSPECTED) EXPOSURE TO COVID-19: ICD-10-CM

## 2021-05-06 DIAGNOSIS — E09.9 STEROID-INDUCED DIABETES MELLITUS, SEQUELA (H): ICD-10-CM

## 2021-05-06 DIAGNOSIS — F32.0 MILD MAJOR DEPRESSION (H): Chronic | ICD-10-CM

## 2021-05-06 DIAGNOSIS — D84.9 IMMUNOSUPPRESSION (H): ICD-10-CM

## 2021-05-06 DIAGNOSIS — T38.0X5S STEROID-INDUCED DIABETES MELLITUS, SEQUELA (H): ICD-10-CM

## 2021-05-06 DIAGNOSIS — K62.5 RECTAL BLEEDING: ICD-10-CM

## 2021-05-06 DIAGNOSIS — C43.62 MALIGNANT MELANOMA OF LEFT UPPER EXTREMITY INCLUDING SHOULDER (H): Chronic | ICD-10-CM

## 2021-05-06 DIAGNOSIS — F41.1 ANXIETY STATE: Chronic | ICD-10-CM

## 2021-05-06 DIAGNOSIS — K59.1 FUNCTIONAL DIARRHEA: ICD-10-CM

## 2021-05-06 DIAGNOSIS — K62.5 HEMORRHAGE OF RECTUM AND ANUS: ICD-10-CM

## 2021-05-06 DIAGNOSIS — E13.9 DIABETES MELLITUS, IATROGENIC (H): Primary | Chronic | ICD-10-CM

## 2021-05-06 LAB
ALBUMIN SERPL-MCNC: 3.4 G/DL (ref 3.4–5)
ALBUMIN UR-MCNC: NEGATIVE MG/DL
ALP SERPL-CCNC: 52 U/L (ref 40–150)
ALT SERPL W P-5'-P-CCNC: 91 U/L (ref 0–50)
ANION GAP SERPL CALCULATED.3IONS-SCNC: 9 MMOL/L (ref 3–14)
APPEARANCE UR: CLEAR
APTT PPP: 23 SEC (ref 22–37)
AST SERPL W P-5'-P-CCNC: 21 U/L (ref 0–45)
BASOPHILS # BLD AUTO: 0.1 10E9/L (ref 0–0.2)
BASOPHILS NFR BLD AUTO: 0.2 %
BILIRUB SERPL-MCNC: 0.2 MG/DL (ref 0.2–1.3)
BILIRUB UR QL STRIP: NEGATIVE
BUN SERPL-MCNC: 19 MG/DL (ref 7–30)
CALCIUM SERPL-MCNC: 8.3 MG/DL (ref 8.5–10.1)
CHLORIDE SERPL-SCNC: 105 MMOL/L (ref 94–109)
CO2 SERPL-SCNC: 24 MMOL/L (ref 20–32)
COLOR UR AUTO: ABNORMAL
CREAT SERPL-MCNC: 0.66 MG/DL (ref 0.52–1.04)
CRP SERPL-MCNC: 13 MG/L (ref 0–8)
CRP SERPL-MCNC: 5.7 MG/L (ref 0–8)
DIFFERENTIAL METHOD BLD: ABNORMAL
EOSINOPHIL # BLD AUTO: 0 10E9/L (ref 0–0.7)
EOSINOPHIL NFR BLD AUTO: 0.1 %
ERYTHROCYTE [DISTWIDTH] IN BLOOD BY AUTOMATED COUNT: 12.9 % (ref 10–15)
ERYTHROCYTE [SEDIMENTATION RATE] IN BLOOD BY WESTERGREN METHOD: 6 MM/H (ref 0–20)
GFR SERPL CREATININE-BSD FRML MDRD: >90 ML/MIN/{1.73_M2}
GLUCOSE SERPL-MCNC: 149 MG/DL (ref 70–99)
GLUCOSE UR STRIP-MCNC: NEGATIVE MG/DL
HCT VFR BLD AUTO: 41.8 % (ref 35–47)
HGB BLD-MCNC: 14 G/DL (ref 11.7–15.7)
HGB UR QL STRIP: ABNORMAL
IMM GRANULOCYTES # BLD: 0.5 10E9/L (ref 0–0.4)
IMM GRANULOCYTES NFR BLD: 1.9 %
INR PPP: 0.96 (ref 0.86–1.14)
INTERPRETATION ECG - MUSE: NORMAL
KETONES UR STRIP-MCNC: NEGATIVE MG/DL
LABORATORY COMMENT REPORT: NORMAL
LACTATE BLD-SCNC: 2.9 MMOL/L (ref 0.7–2)
LACTATE BLD-SCNC: 4 MMOL/L (ref 0.7–2)
LEUKOCYTE ESTERASE UR QL STRIP: NEGATIVE
LIPASE SERPL-CCNC: 47 U/L (ref 73–393)
LYMPHOCYTES # BLD AUTO: 1.1 10E9/L (ref 0.8–5.3)
LYMPHOCYTES NFR BLD AUTO: 3.8 %
MAGNESIUM SERPL-MCNC: 2.6 MG/DL (ref 1.6–2.3)
MCH RBC QN AUTO: 31.7 PG (ref 26.5–33)
MCHC RBC AUTO-ENTMCNC: 33.5 G/DL (ref 31.5–36.5)
MCV RBC AUTO: 95 FL (ref 78–100)
MONOCYTES # BLD AUTO: 0.5 10E9/L (ref 0–1.3)
MONOCYTES NFR BLD AUTO: 1.9 %
NEUTROPHILS # BLD AUTO: 26 10E9/L (ref 1.6–8.3)
NEUTROPHILS NFR BLD AUTO: 92.1 %
NITRATE UR QL: NEGATIVE
NRBC # BLD AUTO: 0 10*3/UL
NRBC BLD AUTO-RTO: 0 /100
PH UR STRIP: 7 PH (ref 5–7)
PHOSPHATE SERPL-MCNC: 2.7 MG/DL (ref 2.5–4.5)
PLATELET # BLD AUTO: 308 10E9/L (ref 150–450)
POTASSIUM SERPL-SCNC: 4.2 MMOL/L (ref 3.4–5.3)
PROCALCITONIN SERPL-MCNC: <0.05 NG/ML
PROT SERPL-MCNC: 6.7 G/DL (ref 6.8–8.8)
RBC # BLD AUTO: 4.41 10E12/L (ref 3.8–5.2)
RBC #/AREA URNS AUTO: <1 /HPF (ref 0–2)
SARS-COV-2 RNA RESP QL NAA+PROBE: NEGATIVE
SODIUM SERPL-SCNC: 138 MMOL/L (ref 133–144)
SOURCE: ABNORMAL
SP GR UR STRIP: 1.01 (ref 1–1.03)
SPECIMEN SOURCE: NORMAL
SQUAMOUS #/AREA URNS AUTO: 1 /HPF (ref 0–1)
TROPONIN I SERPL-MCNC: <0.015 UG/L (ref 0–0.04)
UROBILINOGEN UR STRIP-MCNC: NORMAL MG/DL (ref 0–2)
WBC # BLD AUTO: 28.3 10E9/L (ref 4–11)
WBC #/AREA URNS AUTO: 2 /HPF (ref 0–5)

## 2021-05-06 PROCEDURE — 71275 CT ANGIOGRAPHY CHEST: CPT | Mod: 26 | Performed by: RADIOLOGY

## 2021-05-06 PROCEDURE — 83605 ASSAY OF LACTIC ACID: CPT | Performed by: STUDENT IN AN ORGANIZED HEALTH CARE EDUCATION/TRAINING PROGRAM

## 2021-05-06 PROCEDURE — 81001 URINALYSIS AUTO W/SCOPE: CPT | Performed by: PHYSICIAN ASSISTANT

## 2021-05-06 PROCEDURE — 99207 PR APP CREDIT; MD BILLING SHARED VISIT: CPT | Performed by: PHYSICIAN ASSISTANT

## 2021-05-06 PROCEDURE — 250N000011 HC RX IP 250 OP 636: Performed by: EMERGENCY MEDICINE

## 2021-05-06 PROCEDURE — 86140 C-REACTIVE PROTEIN: CPT | Performed by: PHYSICIAN ASSISTANT

## 2021-05-06 PROCEDURE — C9803 HOPD COVID-19 SPEC COLLECT: HCPCS | Performed by: EMERGENCY MEDICINE

## 2021-05-06 PROCEDURE — 96361 HYDRATE IV INFUSION ADD-ON: CPT | Performed by: EMERGENCY MEDICINE

## 2021-05-06 PROCEDURE — 84145 PROCALCITONIN (PCT): CPT | Performed by: EMERGENCY MEDICINE

## 2021-05-06 PROCEDURE — 87040 BLOOD CULTURE FOR BACTERIA: CPT | Performed by: INTERNAL MEDICINE

## 2021-05-06 PROCEDURE — U0005 INFEC AGEN DETEC AMPLI PROBE: HCPCS | Performed by: EMERGENCY MEDICINE

## 2021-05-06 PROCEDURE — 74177 CT ABD & PELVIS W/CONTRAST: CPT | Mod: 26 | Performed by: RADIOLOGY

## 2021-05-06 PROCEDURE — 83605 ASSAY OF LACTIC ACID: CPT | Performed by: EMERGENCY MEDICINE

## 2021-05-06 PROCEDURE — 250N000009 HC RX 250: Performed by: EMERGENCY MEDICINE

## 2021-05-06 PROCEDURE — 258N000003 HC RX IP 258 OP 636: Performed by: PHYSICIAN ASSISTANT

## 2021-05-06 PROCEDURE — 99285 EMERGENCY DEPT VISIT HI MDM: CPT | Mod: 25 | Performed by: EMERGENCY MEDICINE

## 2021-05-06 PROCEDURE — 99207 PR CDG-MDM COMPONENT: MEETS MODERATE - UP CODED: CPT | Performed by: INTERNAL MEDICINE

## 2021-05-06 PROCEDURE — 84100 ASSAY OF PHOSPHORUS: CPT | Performed by: EMERGENCY MEDICINE

## 2021-05-06 PROCEDURE — 36569 INSJ PICC 5 YR+ W/O IMAGING: CPT

## 2021-05-06 PROCEDURE — 85730 THROMBOPLASTIN TIME PARTIAL: CPT | Performed by: EMERGENCY MEDICINE

## 2021-05-06 PROCEDURE — 86140 C-REACTIVE PROTEIN: CPT | Performed by: EMERGENCY MEDICINE

## 2021-05-06 PROCEDURE — 36592 COLLECT BLOOD FROM PICC: CPT | Performed by: PHYSICIAN ASSISTANT

## 2021-05-06 PROCEDURE — 80053 COMPREHEN METABOLIC PANEL: CPT | Performed by: EMERGENCY MEDICINE

## 2021-05-06 PROCEDURE — 96365 THER/PROPH/DIAG IV INF INIT: CPT | Mod: 59 | Performed by: EMERGENCY MEDICINE

## 2021-05-06 PROCEDURE — 85652 RBC SED RATE AUTOMATED: CPT | Performed by: EMERGENCY MEDICINE

## 2021-05-06 PROCEDURE — C9113 INJ PANTOPRAZOLE SODIUM, VIA: HCPCS | Performed by: PHYSICIAN ASSISTANT

## 2021-05-06 PROCEDURE — 99223 1ST HOSP IP/OBS HIGH 75: CPT | Mod: AI | Performed by: INTERNAL MEDICINE

## 2021-05-06 PROCEDURE — 250N000011 HC RX IP 250 OP 636: Performed by: PHYSICIAN ASSISTANT

## 2021-05-06 PROCEDURE — 74177 CT ABD & PELVIS W/CONTRAST: CPT

## 2021-05-06 PROCEDURE — 83690 ASSAY OF LIPASE: CPT | Performed by: EMERGENCY MEDICINE

## 2021-05-06 PROCEDURE — 83735 ASSAY OF MAGNESIUM: CPT | Performed by: EMERGENCY MEDICINE

## 2021-05-06 PROCEDURE — 99285 EMERGENCY DEPT VISIT HI MDM: CPT | Performed by: EMERGENCY MEDICINE

## 2021-05-06 PROCEDURE — 71275 CT ANGIOGRAPHY CHEST: CPT

## 2021-05-06 PROCEDURE — 250N000013 HC RX MED GY IP 250 OP 250 PS 637: Performed by: EMERGENCY MEDICINE

## 2021-05-06 PROCEDURE — 272N000019 HC KIT OPEN ENDED DOUBLE LUMEN

## 2021-05-06 PROCEDURE — 96376 TX/PRO/DX INJ SAME DRUG ADON: CPT | Performed by: EMERGENCY MEDICINE

## 2021-05-06 PROCEDURE — U0003 INFECTIOUS AGENT DETECTION BY NUCLEIC ACID (DNA OR RNA); SEVERE ACUTE RESPIRATORY SYNDROME CORONAVIRUS 2 (SARS-COV-2) (CORONAVIRUS DISEASE [COVID-19]), AMPLIFIED PROBE TECHNIQUE, MAKING USE OF HIGH THROUGHPUT TECHNOLOGIES AS DESCRIBED BY CMS-2020-01-R: HCPCS | Performed by: EMERGENCY MEDICINE

## 2021-05-06 PROCEDURE — 999N000127 HC STATISTIC PERIPHERAL IV START W US GUIDANCE

## 2021-05-06 PROCEDURE — 250N000013 HC RX MED GY IP 250 OP 250 PS 637: Performed by: PHYSICIAN ASSISTANT

## 2021-05-06 PROCEDURE — 85610 PROTHROMBIN TIME: CPT | Performed by: EMERGENCY MEDICINE

## 2021-05-06 PROCEDURE — 120N000011 HC R&B TRANSPLANT UMMC

## 2021-05-06 PROCEDURE — 258N000003 HC RX IP 258 OP 636: Performed by: EMERGENCY MEDICINE

## 2021-05-06 PROCEDURE — 96375 TX/PRO/DX INJ NEW DRUG ADDON: CPT | Performed by: EMERGENCY MEDICINE

## 2021-05-06 PROCEDURE — 99223 1ST HOSP IP/OBS HIGH 75: CPT | Performed by: NURSE PRACTITIONER

## 2021-05-06 PROCEDURE — 84484 ASSAY OF TROPONIN QUANT: CPT | Performed by: EMERGENCY MEDICINE

## 2021-05-06 PROCEDURE — 85025 COMPLETE CBC W/AUTO DIFF WBC: CPT | Performed by: EMERGENCY MEDICINE

## 2021-05-06 RX ORDER — IOPAMIDOL 755 MG/ML
135 INJECTION, SOLUTION INTRAVASCULAR ONCE
Status: COMPLETED | OUTPATIENT
Start: 2021-05-06 | End: 2021-05-06

## 2021-05-06 RX ORDER — LORAZEPAM 2 MG/ML
0.5 INJECTION INTRAMUSCULAR ONCE
Status: COMPLETED | OUTPATIENT
Start: 2021-05-06 | End: 2021-05-06

## 2021-05-06 RX ORDER — NICOTINE POLACRILEX 4 MG
15-30 LOZENGE BUCCAL
Status: DISCONTINUED | OUTPATIENT
Start: 2021-05-06 | End: 2021-05-11 | Stop reason: HOSPADM

## 2021-05-06 RX ORDER — LIDOCAINE 40 MG/G
CREAM TOPICAL
Status: DISCONTINUED | OUTPATIENT
Start: 2021-05-06 | End: 2021-05-11 | Stop reason: HOSPADM

## 2021-05-06 RX ORDER — SODIUM CHLORIDE 9 MG/ML
INJECTION, SOLUTION INTRAVENOUS CONTINUOUS
Status: DISCONTINUED | OUTPATIENT
Start: 2021-05-06 | End: 2021-05-07

## 2021-05-06 RX ORDER — PIPERACILLIN SODIUM, TAZOBACTAM SODIUM 4; .5 G/20ML; G/20ML
4.5 INJECTION, POWDER, LYOPHILIZED, FOR SOLUTION INTRAVENOUS ONCE
Status: COMPLETED | OUTPATIENT
Start: 2021-05-06 | End: 2021-05-06

## 2021-05-06 RX ORDER — HEPARIN SODIUM,PORCINE 10 UNIT/ML
5-15 VIAL (ML) INTRAVENOUS EVERY 24 HOURS
Status: DISCONTINUED | OUTPATIENT
Start: 2021-05-06 | End: 2021-05-11 | Stop reason: HOSPADM

## 2021-05-06 RX ORDER — ACETAMINOPHEN 325 MG/1
650 TABLET ORAL EVERY 4 HOURS PRN
Status: DISCONTINUED | OUTPATIENT
Start: 2021-05-06 | End: 2021-05-10

## 2021-05-06 RX ORDER — HEPARIN SODIUM,PORCINE 10 UNIT/ML
5 VIAL (ML) INTRAVENOUS
Status: DISCONTINUED | OUTPATIENT
Start: 2021-05-06 | End: 2021-05-11 | Stop reason: HOSPADM

## 2021-05-06 RX ORDER — PIPERACILLIN SODIUM, TAZOBACTAM SODIUM 4; .5 G/20ML; G/20ML
4.5 INJECTION, POWDER, LYOPHILIZED, FOR SOLUTION INTRAVENOUS EVERY 6 HOURS
Status: DISCONTINUED | OUTPATIENT
Start: 2021-05-06 | End: 2021-05-07

## 2021-05-06 RX ORDER — ONDANSETRON 4 MG/1
4 TABLET, ORALLY DISINTEGRATING ORAL EVERY 6 HOURS PRN
Status: DISCONTINUED | OUTPATIENT
Start: 2021-05-06 | End: 2021-05-11 | Stop reason: HOSPADM

## 2021-05-06 RX ORDER — ONDANSETRON 2 MG/ML
4 INJECTION INTRAMUSCULAR; INTRAVENOUS EVERY 6 HOURS PRN
Status: DISCONTINUED | OUTPATIENT
Start: 2021-05-06 | End: 2021-05-11 | Stop reason: HOSPADM

## 2021-05-06 RX ORDER — VANCOMYCIN HYDROCHLORIDE 125 MG/1
125 CAPSULE ORAL 2 TIMES DAILY
Status: DISCONTINUED | OUTPATIENT
Start: 2021-05-06 | End: 2021-05-10

## 2021-05-06 RX ORDER — DEXTROSE MONOHYDRATE 25 G/50ML
25-50 INJECTION, SOLUTION INTRAVENOUS
Status: DISCONTINUED | OUTPATIENT
Start: 2021-05-06 | End: 2021-05-11 | Stop reason: HOSPADM

## 2021-05-06 RX ORDER — SODIUM CHLORIDE, SODIUM LACTATE, POTASSIUM CHLORIDE, CALCIUM CHLORIDE 600; 310; 30; 20 MG/100ML; MG/100ML; MG/100ML; MG/100ML
INJECTION, SOLUTION INTRAVENOUS CONTINUOUS
Status: DISCONTINUED | OUTPATIENT
Start: 2021-05-06 | End: 2021-05-11 | Stop reason: HOSPADM

## 2021-05-06 RX ORDER — HYDROMORPHONE HYDROCHLORIDE 1 MG/ML
0.5 INJECTION, SOLUTION INTRAMUSCULAR; INTRAVENOUS; SUBCUTANEOUS EVERY 4 HOURS PRN
Status: DISCONTINUED | OUTPATIENT
Start: 2021-05-06 | End: 2021-05-11 | Stop reason: HOSPADM

## 2021-05-06 RX ADMIN — HYDROMORPHONE HYDROCHLORIDE 1 MG: 1 INJECTION, SOLUTION INTRAMUSCULAR; INTRAVENOUS; SUBCUTANEOUS at 14:04

## 2021-05-06 RX ADMIN — LIDOCAINE HYDROCHLORIDE 30 ML: 20 SOLUTION ORAL; TOPICAL at 16:24

## 2021-05-06 RX ADMIN — SODIUM CHLORIDE, POTASSIUM CHLORIDE, SODIUM LACTATE AND CALCIUM CHLORIDE 1000 ML: 600; 310; 30; 20 INJECTION, SOLUTION INTRAVENOUS at 19:35

## 2021-05-06 RX ADMIN — PIPERACILLIN SODIUM AND TAZOBACTAM SODIUM 4.5 G: 4; .5 INJECTION, POWDER, LYOPHILIZED, FOR SOLUTION INTRAVENOUS at 22:34

## 2021-05-06 RX ADMIN — SODIUM CHLORIDE 1000 ML: 9 INJECTION, SOLUTION INTRAVENOUS at 14:04

## 2021-05-06 RX ADMIN — HYDROMORPHONE HYDROCHLORIDE 0.5 MG: 1 INJECTION, SOLUTION INTRAMUSCULAR; INTRAVENOUS; SUBCUTANEOUS at 18:04

## 2021-05-06 RX ADMIN — Medication 5 ML: at 22:28

## 2021-05-06 RX ADMIN — HYDROMORPHONE HYDROCHLORIDE 0.5 MG: 1 INJECTION, SOLUTION INTRAMUSCULAR; INTRAVENOUS; SUBCUTANEOUS at 22:54

## 2021-05-06 RX ADMIN — PANTOPRAZOLE SODIUM 40 MG: 40 INJECTION, POWDER, FOR SOLUTION INTRAVENOUS at 17:06

## 2021-05-06 RX ADMIN — PIPERACILLIN SODIUM,TAZOBACTAM SODIUM 4.5 G: 4; .5 INJECTION, POWDER, FOR SOLUTION INTRAVENOUS at 17:09

## 2021-05-06 RX ADMIN — ACETAMINOPHEN 650 MG: 325 TABLET, FILM COATED ORAL at 20:03

## 2021-05-06 RX ADMIN — SODIUM CHLORIDE, POTASSIUM CHLORIDE, SODIUM LACTATE AND CALCIUM CHLORIDE 1000 ML: 600; 310; 30; 20 INJECTION, SOLUTION INTRAVENOUS at 16:24

## 2021-05-06 RX ADMIN — SODIUM CHLORIDE, POTASSIUM CHLORIDE, SODIUM LACTATE AND CALCIUM CHLORIDE: 600; 310; 30; 20 INJECTION, SOLUTION INTRAVENOUS at 23:01

## 2021-05-06 RX ADMIN — LORAZEPAM 0.5 MG: 2 INJECTION INTRAMUSCULAR; INTRAVENOUS at 17:47

## 2021-05-06 RX ADMIN — IOPAMIDOL 135 ML: 755 INJECTION, SOLUTION INTRAVENOUS at 16:34

## 2021-05-06 RX ADMIN — SODIUM CHLORIDE: 9 INJECTION, SOLUTION INTRAVENOUS at 22:34

## 2021-05-06 RX ADMIN — VANCOMYCIN HYDROCHLORIDE 125 MG: 125 CAPSULE ORAL at 21:20

## 2021-05-06 RX ADMIN — SODIUM CHLORIDE: 9 INJECTION, SOLUTION INTRAVENOUS at 16:15

## 2021-05-06 ASSESSMENT — ACTIVITIES OF DAILY LIVING (ADL)
DRESSING/BATHING_DIFFICULTY: NO
VISION_MANAGEMENT: GLASSES
WALKING_OR_CLIMBING_STAIRS_DIFFICULTY: NO
DOING_ERRANDS_INDEPENDENTLY_DIFFICULTY: NO
TOILETING_ISSUES: YES
WHICH_OF_THE_ABOVE_FUNCTIONAL_RISKS_HAD_A_RECENT_ONSET_OR_CHANGE?: TOILETING
DIFFICULTY_COMMUNICATING: NO
FALL_HISTORY_WITHIN_LAST_SIX_MONTHS: NO
CONCENTRATING,_REMEMBERING_OR_MAKING_DECISIONS_DIFFICULTY: NO
DIFFICULTY_EATING/SWALLOWING: NO
WEAR_GLASSES_OR_BLIND: YES
EQUIPMENT_CURRENTLY_USED_AT_HOME: GLUCOMETER

## 2021-05-06 ASSESSMENT — MIFFLIN-ST. JEOR
SCORE: 1729.98
SCORE: 1735.88

## 2021-05-06 ASSESSMENT — ENCOUNTER SYMPTOMS
FEVER: 0
DIZZINESS: 1
NAUSEA: 1
BLOOD IN STOOL: 1
DIARRHEA: 1

## 2021-05-06 NOTE — CONSULTS
GASTROENTEROLOGY CONSULTATION      Date of Admission:  5/6/2021          ASSESSMENT AND RECOMMENDATIONS:   Assessment:  47 yo F with significant PMH of melanoma,  h/o nivolumab induced colitis treated with Remicade and Entyvio now in remission, h/o giardia infection, h/o C.diff infection 8 years ago, seronegative RA on prednisone & tocilizumab, recently strep throat treated with penicillin c/b C.diff and started PO Vancomycin 4/7 without improvement. Recently hospitalized 4/26-5/3 with new UC diagnosis, transitioned from IV steroids to PO on 5/3 with a plan to establish care at [facility] and start vedolizumab, now back in [facility] ED on 5/6 with recurrent abdominal pain and hematochezia.    # Acute flare of ulcerative proctosigmoiditis  # H/o checkpoint inhibitor induced colitis s/p Remicade  # RA on tocilizumab & Prednisone  #Tenesmus, bloody diarrhea  Now has WBC 20 >>> 28, LA 4.0, chills, and sweating with recurrent tenesmus, abdominal pain, and increase in hematochezia following 3.5 days of PO steroid taper. She also has significant LLQ tenderness to palpation and mild distension concerning for possible peritoneal irritation. Prior to repeat flex sigmoidoscopy need to rule microperforation or malka abscess. Pending CT and infectious studies will also transition back to IV steroids.     Recommendations  -Repeat CT ab pelvis  -Colorectal surgery consult, appreciate input  -Blood cultures with consider of broad spectrum antibiotics if positive  -Infectious stools studies  -Ok to restart 32 mg Solumedrol IV BID if infectious studies negative and CT not concerning for perforation or abscess   -Daily CRP and hgb  -Track # daily stools and % with blood  -NPO, Flex sig tomorrow (2 tapwaters enemas at 0700 if tolerable)  -Serial abdominal exam  -DVT prophylaxis    GI will continue to follow. Thank you for involving us in this patient's care. Please do not hesitate to contact the GI service with any questions or concerns.     Pt  care plan discussed with Dr. New, GI staff physician.    AMADA Bustillo CNP  Text page  -------------------------------------------------------------------------------------------------------------------          Chief Complaint:   We were asked by Dr. Robbins of emergency medicine to evaluate this patient with UC, hematochezia, and abdominal pain    History is obtained from the patient and the medical record.          History of Present Illness:   45 yo F with significant PMH of melanoma,  h/o nivolumab induced colitis treated with Remicade and Entyvio now in remission, h/o giardia infection, h/o C.diff infection 8 years ago, seronegative RA on prednisone & tocilizumab, recently strep throat treated with penicillin c/b C.diff and started PO Vancomycin 4/7 without improvement. Recently hospitalized 4/26-5/3 with new UC diagnosis, transitioned from IV steroids to PO on 5/3 with a plan to establish care at [facility] and start vedolizumab, now back in [facility] ED on 5/6 with recurrent abdominal pain and hematochezia.    On 5/4 she had 4 bloody bowel movement. By last night she had 6+ bloody outputs, a couple with stool. Since 0200 she reports at least 10-15 episodes of malka blood without stool. She also has recurrence of significant LLQ cramping pain and also chest pain that feels like bad acid reflux. She also has 'rectal' pain, that 'burns from inside'. Proctofoam administration at home has not helped at all. She is in tears describing her overwhelming fatigue and frustration with her persistent bleeding.      She endorses chills and sweating. No change in joint pain. No new rash.        Past Medical History:   Reviewed and edited as appropriate  Past Medical History:   Diagnosis Date     Abnormal MRI     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      Anxiety      Basal cell carcinoma      Cervical high risk HPV (human papillomavirus) test positive 12/13/2019    See problem list     Colitis       Depression      Diabetes mellitus, iatrogenic (H) 2020     Inflammatory arthritis      Insomnia      Intestinal giardiasis 3/5/2018     Lumbago     left lower back pain     Lymphedema      Malignant melanoma (H)      Melanoma (H) 10/23/2017     Mild persistent asthma      Obesity      STORMY (obstructive sleep apnea)      Prothrombin deficiency (H)     takes 81mg asa daily     Stroke (cerebrum) (H)     During      TIA (transient ischemic attack)      Type 2 diabetes mellitus (H)             Past Surgical History:   Reviewed and edited as appropriate   Past Surgical History:   Procedure Laterality Date     APPENDECTOMY       COLONOSCOPY N/A 10/18/2017    Procedure: COLONOSCOPY;  Colon;  Surgeon: Debbie Stephens MD;  [facility]     COLONOSCOPY N/A 3/9/2018    Procedure: COMBINED COLONOSCOPY, SINGLE OR MULTIPLE BIOPSY/POLYPECTOMY BY BIOPSY;  colon;  Surgeon: Benita Schumacher MD;  Location: [facility] GI     COLONOSCOPY      multiple since  to present - about 6 total     DISSECT LYMPH NODE AXILLA Left 10/23/2017    Procedure: DISSECT LYMPH NODE AXILLA;  Left Axillary Lymph Node Dissection ;  Surgeon: Laurent Cool MD;  Location: [facility] OR     EXAM UNDER ANESTHESIA PELVIC N/A 2020    Procedure: EXAM UNDER ANESTHESIA, PELVIS; with Cervical Biopsies, Vaginal Biopsy and Endocervical Curettings;  Surgeon: Melina Jung MD;  Location: [facility] OR     GYN SURGERY  ,          REPAIR MOHS Left 2017    Procedure: REPAIR MOHS;  Left Upper Lid Moh's Reconstruction;  Surgeon: Kisha Bosch MD;  [facility]: [facility] OR            Previous Endoscopy:     Results for orders placed or performed during the hospital encounter of 18   COLONOSCOPY   Result Value Ref Range    COLONOSCOPY       [address] [place] [address] [phone]     Endoscopy Department  _______________________________________________________________________________  Patient Name:  Doretha Fernandez            Procedure Date: 3/9/2018 10:30 AM  MRN: [id number]                       Account Number: [id number]  YOB: 1975              Admit Type: Inpatient  Age: 43                                Gender: Female  Note Status: Finalized                Attending MD: Benita Schumacher MD  Pause for the Cause: performed.       Total Sedation Time: 20 minutes of   continuous 1:1 bedside monitoring performed.  _______________________________________________________________________________     Procedure:           Colonoscopy  Indications:         Hematochezia, 44 yo F wtih hematochezia and diarrhea                        with stool + giardia, currently on Opdivo for metastatic                        melanoma. CT showed left sided colitis.  Providers:           Pj Schumacher MD, Renard Cosme RN  Referring MD:          Medicines:           Midazolam 1.5 mg IV, Meperidine 25 mg IV,                        Diphenhydramine 25 mg IV  Complications:       No immediate complications.  _______________________________________________________________________________  Procedure:           Pre-Anesthesia Assessment:                       - Prior to the procedure, a History and Physical was                        performed, and patient medications and allergies were                        reviewed. The patient is competent. The risks and                        benefits of the procedure and the sedation options and                        risks were discussed with the patient. All questions                        were answered and informed consent was obtained. Patient                        identification and proposed procedure were verified by                        the physician and the nurse in the pre-procedure area in                        the procedure  room. Mental Status Examination: alert and                        oriented. Airway Examination: normal oropharyngeal                        airway  and neck mobility. Respiratory Examination: clear                        to auscultation. CV Examination: normal. Prophylactic                        Antibiotics: The patient does not require prophylactic                        antibiotics. Prior Anticoagulants: The patient has taken                        no previous anticoagulant or antiplatelet agents. ASA                        Grade Assessment: II - A patient with mild systemic                        disease. After reviewing the risks and benefits, the                        patient was deemed in satisfactory condition to undergo                        the procedure. The anesthesia plan was to use moderate                        sedation / analgesia (conscious sedation). Immediately                        prior to administration of medications, the patient was                        re- assessed for adequacy to receive sedatives. The heart                        rate, respiratory rate, oxygen saturations, blood                        pressure, adequacy of pulmonary ventilation, and                        response to care were monitored throughout the                        procedure. The physical status of the patient was                        re-assessed after the procedure.                       After obtaining informed consent, the colonoscope was                        passed under direct vision. Throughout the procedure,                        the patient's blood pressure, pulse, and oxygen                        saturations were monitored continuously. The pediatric                        colonoscope was introduced through the anus and advanced                        to the transverse colon for evaluation. This was the                        intended extent. Due to poor prep and patient                        discomfort, scope was not advanced further. The                         colonoscopy was performed without difficulty. The                         patient tolerated the procedure well. The quality of the                        bowel preparation was poor, with solid stool throughout                        the exam.                                                                                   Findings:       The perianal and digital rectal examinations were normal.       A diffuse area of moderately congested, erythematous, granular and        vascular-pattern-decreased mucosa was found in the rectum, in the        recto-sigmoid colon, in the sigmoid colon, in the descending colon and        in the transverse colon. There was overlying exudate that could be        washed off. This was most severe in the rectosigmoid area, which        contained patchy areas of petechiae. Biopsies were taken with a cold        forceps for histology (rushed).                                                                                   Impression:           - Preparation of the colon was poor.                       - Congested, erythematous, granular and                        vascular-pattern-decreased mucosa in the rectum, in the                        recto-sigmoid colon, in the sigmoid colon, in the                        descending colon and in the transverse colon. Biopsied.  Recommendation:      - Return patient to hospital [facility] for ongoing care.                       - Resume previous diet.                       - Continue present medications.                       - Await pathology results (rushed).                                                                                     Electronically signed by: Benita Schumacher MD  __________________  Benita Schumacher MD  3/9/2018 12:14:51 PM  I was physically present for the entire viewing portion of the exam.  __________________________  Signature of teaching physician  Gauri/Dominik Schumacher MD  Number of Addenda: 0    Note Initiated On: 3/9/2018 8:16 AM              Social History:   Reviewed and edited as  appropriate  Social History     Socioeconomic History     Marital status:      Spouse name: Not on file     Number of children: Not on file     Years of education: Not on file     Highest education level: Not on file   Occupational History     Not on file   Social Needs     Financial resource strain: Not on file     Food insecurity     Worry: Not on file     Inability: Not on file     Transportation needs     Medical: Not on file     Non-medical: Not on file   Tobacco Use     Smoking status: Former Smoker     Packs/day: 1.00     Years: 5.00     Pack years: 5.00     Quit date: 3/20/1998     Years since quittin.1     Smokeless tobacco: Never Used   Substance and Sexual Activity     Alcohol use: Yes     Comment: occ     Drug use: No     Sexual activity: Not Currently     Partners: Male     Birth control/protection: Surgical   Lifestyle     Physical activity     Days per week: Not on file     Minutes per session: Not on file     Stress: Not on file   Relationships     Social connections     Talks on phone: Not on file     Gets together: Not on file     Attends Yarsanism service: Not on file     Active member of club or organization: Not on file     Attends meetings of clubs or organizations: Not on file     Relationship status: Not on file     Intimate partner violence     Fear of current or ex partner: Not on file     Emotionally abused: Not on file     Physically abused: Not on file     Forced sexual activity: Not on file   Other Topics Concern     Parent/sibling w/ CABG, MI or angioplasty before 65F 55M? No   Social History Narrative    19 y.o- patient's mother   of lung cancer. She had to take care of her younger sister.    2012- patient's  had a heart attack with stents placed, followed by cardiac rehabilitation    2000 TO 2012  was in [facility] for depression    2013 patient's  went through alcohol rehabilitation at  [place] inpatient            They have attended couple counseling a couple of times and patient went to the family program for chemical dependency.    Patient denies alcohol or drug use and herself            Has 2 children, girls ages 10 and 13     For a while she was working 3 jobs since her  was ill. 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 Reports her job is very stressful.                Family History:   Reviewed and edited as appropriate  Family History   Problem Relation Age of Onset     Cancer Mother 45        lung     Neurologic Disorder Mother         epilepsy     Lipids Father      Gastrointestinal Disease Father         diverticulitis      Depression Father      Cancer Maternal Grandmother      Blood Disease Maternal Grandmother         lymphoma      Arthritis Maternal Grandmother      Diabetes Maternal Grandmother      Depression Maternal Grandmother      Macular Degeneration Maternal Grandmother      Glaucoma Maternal Grandmother      Diabetes Maternal Grandfather      Cerebrovascular Disease Maternal Grandfather      Blood Disease Maternal Grandfather      Heart Disease Maternal Grandfather      Glaucoma Maternal Grandfather      Cancer Paternal Grandmother      Cancer - colorectal Paternal Grandmother      Respiratory Paternal Grandfather         emphysema      Heart Disease Daughter      Asthma Daughter      Depression Sister      Melanoma No family hx of             Allergies:   Reviewed and edited as appropriate     Allergies   Allergen Reactions     Bee Venom Swelling     Azithromycin Diarrhea     Erythromycin      Other reaction(s): GI intolerance, Vomiting     Fentanyl Other (See Comments)     sweating  sweating     Prochlorperazine Fatigue     Other reaction(s): Other (see comments)  Fatigue     Buspirone      Other reaction(s): GI intolerance  vomiting     Erythrocin Nausea and Vomiting     Zithromax [Azithromycin Dihydrate] Diarrhea     Enbrel [Etanercept] Hives and Rash             Medications:     Current Facility-Administered Medications   Medication     0.9% sodium chloride BOLUS    Followed by     sodium chloride 0.9% infusion     lidocaine (LMX4) cream     lidocaine 1 % 0.1-1 mL     sodium chloride (PF) 0.9% PF flush 10 mL     sodium chloride (PF) 0.9% PF flush 10-20 mL     Current Outpatient Medications   Medication Sig     ACCU-CHEK GUIDE test strip USE TO TEST BLOOD SUGAR TWO TIMES A DAY OR AS DIRECTED     acetaminophen (TYLENOL) 500 MG tablet Take 1,000 mg by mouth every 8 hours as needed for mild pain     amLODIPine (NORVASC) 5 MG tablet Take 1 tablet (5 mg) by mouth daily     blood glucose monitoring (ACCU-CHEK FASTCLIX) lancets USE TWO TIMES A DAY OR AS DIRECTED     Calcium Carb-Cholecalciferol 600-800 MG-UNIT TABS Take 800 mg by mouth daily before breakfast     calcium carbonate (TUMS) 500 MG chewable tablet Take 1 tablet (500 mg) by mouth 3 times daily as needed for heartburn     dicyclomine (BENTYL) 20 MG tablet Take 1 tablet (20 mg) by mouth 3 times daily (with meals)     famotidine (PEPCID) 10 MG tablet Take 1 tablet (10 mg) by mouth 2 times daily     ferrous fumarate 65 mg, [place]. FE,-Vitamin C 125 mg (VITRON-C)  MG TABS tablet Take 1 tablet by mouth daily     gabapentin (NEURONTIN) 300 MG capsule TAKE THREE CAPSULES BY MOUTH FOUR TIMES A DAY     hydrocortisone-pramoxine (PROCTOFOAM-HC) rectal foam Place 1 Applicatorful rectally 2 times daily     hydrOXYzine (ATARAX) 25 MG tablet TAKE ONE TABLET BY MOUTH AT BEDTIME (Patient taking differently: As needed)     metFORMIN (GLUCOPHAGE-XR) 500 MG 24 hr tablet Take 1,000 mg by mouth Daily with breakfast.     ondansetron (ZOFRAN) 8 MG tablet Take 8 mg by mouth every 8 hours as needed for nausea (Pt has not taken in past year 2/10/20)      pantoprazole (PROTONIX) 40 MG EC tablet Take 1 tablet (40 mg) by mouth every morning (before breakfast)     predniSONE (DELTASONE) 5 MG tablet Take 8 tablets (40 mg) by mouth daily  "    rosuvastatin (CRESTOR) 10 MG tablet Take 1 tablet (10 mg) by mouth daily     sulfamethoxazole-trimethoprim (BACTRIM DS) 800-160 MG tablet Take 1 tablet by mouth daily     vancomycin (VANCOCIN) 125 MG capsule Take 1 capsule (125 mg) by mouth 4 times daily for 3 days, THEN 1 capsule (125 mg) 2 times daily for 7 days, THEN 1 capsule (125 mg) daily for 7 days, THEN 1 capsule (125 mg) every other day for 14 days.     venlafaxine (EFFEXOR-ER) 225 MG 24 hr tablet TAKE ONE TABLET BY MOUTH ONCE DAILY     VITAMIN D3 25 MCG (1000 UT) tablet TAKE THREE TABLETS BY MOUTH ONCE DAILY     Facility-Administered Medications Ordered in Other Encounters   Medication     lidocaine 1 % 9 mL     sodium bicarbonate 8.4 % injection 1 mEq             Review of Systems:     A complete review of systems was performed and is negative except as noted in the HPI           Physical Exam:   /84   Pulse 88   Temp 98.7  F (37.1  C) (Oral)   Resp 8   Ht 1.6 m (5' 3\")   Wt 111.6 kg (246 lb)   SpO2 100%   BMI 43.58 kg/m    Wt:   Wt Readings from Last 2 Encounters:   05/06/21 111.6 kg (246 lb)   04/27/21 112.6 kg (248 lb 3.2 oz)      Constitutional: cooperative, pleasant, not dyspneic/diaphoretic, no acute distress  Eyes: Sclera anicteric/injected  Ears/nose/mouth/throat: Normal oropharynx without ulcers or exudate, mucus membranes moist, hearing intact  Neck: supple without obvious mass  CV: No edema  Respiratory: Unlabored breathing  Lymph: No submandibular, supraclavicular or inguinal lymphadenopathy  Abd: mild distension, +bs, no hepatosplenomegaly, significant LLQ tenderness, no peritoneal signs  Skin: warm, perfused, no jaundice  Neuro: AAO x 3, No asterixis  Psych: Normal affect  MSK: No gross deformities         Data:   Labs and imaging below were independently reviewed and interpreted    BMP  Recent Labs   Lab 05/06/21  1356 05/02/21  0836 05/01/21  1302 04/30/21  1705     --  136 137   POTASSIUM 4.2  --  4.2 3.8   CHLORIDE " 105  --  105 105   PATRICIA 8.3*  --  8.9 9.4   CO2 24  --  26 26   BUN 19  --  23 22   CR 0.66 0.70 0.68 0.66   *  --  319* 196*     CBC  Recent Labs   Lab 21  1356 21  0956 21  0836 21  1302   WBC 28.3* 20.3* 20.7* 20.0*   RBC 4.41 4.34 4.57 4.23   HGB 14.0 13.7 14.1 12.9   HCT 41.8 41.1 42.5 39.6   MCV 95 95 93 94   MCH 31.7 31.6 30.9 30.5   MCHC 33.5 33.3 33.2 32.6   RDW 12.9 12.7 12.5 12.8    307 331 305     INRNo lab results found in last 7 days.  LFTs  Recent Labs   Lab 21  1356 21  1302 21  1705   ALKPHOS 52 53 60   AST 21 36 72*   ALT 91* 140* 158*   BILITOTAL 0.2 0.2 0.1*   PROTTOTAL 6.7* 6.4* 7.2   ALBUMIN 3.4 3.3* 3.7      PANCNo lab results found in last 7 days.    Imagin2021 ct ab pelv    IMPRESSION:   1.  No evidence for active colitis.  2.  Colonic diverticulosis without evidence for diverticulitis.     I have personally reviewed the examination and initial interpretation  and I agree with the findings.     ASIA ARAGON MD

## 2021-05-06 NOTE — ED TRIAGE NOTES
Pt reports multiple bloody bowel movements today. Complains of rectal pain, generalized body aches, and dizziness.

## 2021-05-06 NOTE — ED PROVIDER NOTES
"    [place] EMERGENCY DEPARTMENT ([facility])  5/06/21    History     Chief Complaint   Patient presents with     Rectal Bleeding     The history is provided by the patient and medical records.     Doretha Fernandez is a 46 year old female with complex past medical history significant for metastatic malignant melanoma of lymph nodes of the axilla and upper limb (10/2017), s/p left axillary lymphadenectomy, lymphedema of the left arm, rheumatoid arthritis (immunosuppressed on prednisone 5 mg PO daily), type II iatrogenic diabetes mellitus, drug-induced Cushing's syndrome, prior hx of checkpoint inhibitor colitis in 2018 after treatment with nivolumab for melanoma, s/p flexible sigmoidoscopy (4/22/2021) with biopsies diagnostic for ulcerative colitis who presents to the emergency room for evaluation of a colitis flare.  She notes 10-15 episodes of bright red blood per rectum with mucus, blood, blood clots, and diarrhea since 2 AM this morning.  She notes that in the past 24 hours, her symptoms have been the worst that they have ever been.  She endorses hot flashes and sweating but denies fever.  She is on a prednisone taper but is still on 40 mg, which is what she was taking at discharge. Patient was also started on Bactrim and Norvasc medication during her last hospitalization.  She notes severe heartburn with these medications.  Patient endorses nausea, dizziness, and thirst.  Patient states that anything she drinks or eats feels like it \"goes right through\".  Patient has been taking steroids for 2.5 years without problem until 1 month ago. She is followed by Dr. Charles here at [facility] and is planning on starting treatment with him.     Per review of the patient's medical record, they were recently hospitalized at [facility] from 4/26/2021-5/3/2021 for evaluation of colitis flare. Prior to admission, they were noted to have presented with severe left sided abdominal pain and continued bloody " diarrhea and passing pure bright red blood per rectum 10-15x per day. At this time, patient was also receiving PO vancomycin for treatment of c difficile (positive test 4/5/2021), with negative C. Diff on admission.  CBC showed leukocytosis with white count of 14.5.  Hemoglobin was 12.3 with a normal platelet count.  CRP is elevated at 20. Solumedrol 32 mg, IV fluids, analgesics, and steroids were given in the ED.  Patient improved upon admission with IV steroids and transitioned to PO prior to discharge with plan for close follow up with GI.    CT Abdomen Pelvis w Contrast 4/26/2021  IMPRESSION:   1.  No evidence for active colitis.  2.  Colonic diverticulosis without evidence for diverticulitis.   ---------------------------------------------------------------------------------------------------------------  Per chart review from [facility] visit on 4/22/21, patient underwent flexible sigmoidoscopy for recent onset of bloody diarrhea.     FLEXIBLE SIGMOIDOSCOPY - [facility] (4/22/2021)  Post-op Diagnoses:  - Preparation of the colon was fair.  - Diffuse moderate inflammation was found in the rectum and in the sigmoid colon secondary to colitis. Biopsied.  - The descending colon is normal.     Surg Path exam - [facility] 4/22/21  Biopsy - Colon     FINAL DIAGNOSIS  A. Colon, Sigmoid, Rectum, endoscopic biopsy:  Moderate  active chronic colitis. There is basal plasmacytosis and  crypt architectural distortion. No granulomas.  No  dysplasia.  -----------------------------------------------------------------------------------------------------    Additionally per chart review, patient was diagnosed with metastatic malignant melanoma in 10/2017 and developed severe colitis secondary to nivolumab s/p 8 cycles, completed 2/22/2018.  Patient was placed on a dose of Remicade after poor response to high-dose steroids and 5-ASA.  She transition to [place] and is currently on Actemra and was to continue on prednisone.      Past  Medical History:   Diagnosis Date     Abnormal MRI     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      Anxiety      Basal cell carcinoma      Cervical high risk HPV (human papillomavirus) test positive 2019    See problem list     Colitis      Depression      Diabetes mellitus, iatrogenic (H) 2020     Inflammatory arthritis      Insomnia      Intestinal giardiasis 3/5/2018     Lumbago     left lower back pain     Lymphedema      Malignant melanoma (H)      Melanoma (H) 10/23/2017     Mild persistent asthma      Obesity      STORMY (obstructive sleep apnea)      Prothrombin deficiency (H)     takes 81mg asa daily     Stroke (cerebrum) (H)     During      TIA (transient ischemic attack)      Type 2 diabetes mellitus (H)        Past Surgical History:   Procedure Laterality Date     APPENDECTOMY       COLONOSCOPY N/A 10/18/2017    Procedure: COLONOSCOPY;  Colon;  Surgeon: Debbie Stephens MD;  [facility]     COLONOSCOPY N/A 3/9/2018    Procedure: COMBINED COLONOSCOPY, SINGLE OR MULTIPLE BIOPSY/POLYPECTOMY BY BIOPSY;  colon;  Surgeon: Benita Schumacher MD;  Location: [facility] GI     COLONOSCOPY      multiple since  to present - about 6 total     DISSECT LYMPH NODE AXILLA Left 10/23/2017    Procedure: DISSECT LYMPH NODE AXILLA;  Left Axillary Lymph Node Dissection ;  Surgeon: Laurent Cool MD;  Location: [facility] OR     EXAM UNDER ANESTHESIA PELVIC N/A 2020    Procedure: EXAM UNDER ANESTHESIA, PELVIS; with Cervical Biopsies, Vaginal Biopsy and Endocervical Curettings;  Surgeon: Melina Jung MD;  Location: [facility] OR     GYN SURGERY  ,          REPAIR MOHS Left 2017    Procedure: REPAIR MOHS;  Left Upper Lid Moh's Reconstruction;  Surgeon: Kisha Bosch MD;  [facility]: [facility] OR       Family History   Problem Relation Age of Onset     Cancer Mother 45        lung     Neurologic Disorder Mother         epilepsy     Lipids Father      Gastrointestinal Disease  Father         diverticulitis      Depression Father      Cancer Maternal Grandmother      Blood Disease Maternal Grandmother         lymphoma      Arthritis Maternal Grandmother      Diabetes Maternal Grandmother      Depression Maternal Grandmother      Macular Degeneration Maternal Grandmother      Glaucoma Maternal Grandmother      Diabetes Maternal Grandfather      Cerebrovascular Disease Maternal Grandfather      Blood Disease Maternal Grandfather      Heart Disease Maternal Grandfather      Glaucoma Maternal Grandfather      Cancer Paternal Grandmother      Cancer - colorectal Paternal Grandmother      Respiratory Paternal Grandfather         emphysema      Heart Disease Daughter      Asthma Daughter      Depression Sister      Melanoma No family hx of        Social History     Tobacco Use     Smoking status: Former Smoker     Packs/day: 1.00     Years: 5.00     Pack years: 5.00     Quit date: 3/20/1998     Years since quittin.1     Smokeless tobacco: Never Used   Substance Use Topics     Alcohol use: Yes     Comment: occ       Current Facility-Administered Medications   Medication     glucose gel 15-30 g    Or     dextrose 50 % injection 25-50 mL    Or     glucagon injection 1 mg     heparin lock flush 10 UNIT/ML injection 5 mL     heparin lock flush 10 UNIT/ML injection 5-15 mL     insulin aspart (NovoLOG) injection (RAPID ACTING)     lactated ringers BOLUS 1,000 mL     lidocaine (LMX4) cream     lidocaine (LMX4) cream     lidocaine 1 % 0.1-1 mL     lidocaine 1 % 0.1-1 mL     pantoprazole (PROTONIX) IV push injection 40 mg     piperacillin-tazobactam (ZOSYN) 4.5 g vial to attach to  mL bag     sodium chloride (PF) 0.9% PF flush 10 mL     sodium chloride (PF) 0.9% PF flush 10 mL     sodium chloride (PF) 0.9% PF flush 10-20 mL     sodium chloride (PF) 0.9% PF flush 10-20 mL     sodium chloride 0.9% infusion     Current Outpatient Medications   Medication     ACCU-CHEK GUIDE test strip      acetaminophen (TYLENOL) 500 MG tablet     amLODIPine (NORVASC) 5 MG tablet     blood glucose monitoring (ACCU-CHEK FASTCLIX) lancets     Calcium Carb-Cholecalciferol 600-800 MG-UNIT TABS     calcium carbonate (TUMS) 500 MG chewable tablet     dicyclomine (BENTYL) 20 MG tablet     famotidine (PEPCID) 10 MG tablet     ferrous fumarate 65 mg, Sycuan. FE,-Vitamin C 125 mg (VITRON-C)  MG TABS tablet     gabapentin (NEURONTIN) 300 MG capsule     hydrocortisone-pramoxine (PROCTOFOAM-HC) rectal foam     hydrOXYzine (ATARAX) 25 MG tablet     metFORMIN (GLUCOPHAGE-XR) 500 MG 24 hr tablet     ondansetron (ZOFRAN) 8 MG tablet     pantoprazole (PROTONIX) 40 MG EC tablet     predniSONE (DELTASONE) 5 MG tablet     rosuvastatin (CRESTOR) 10 MG tablet     sulfamethoxazole-trimethoprim (BACTRIM DS) 800-160 MG tablet     vancomycin (VANCOCIN) 125 MG capsule     venlafaxine (EFFEXOR-ER) 225 MG 24 hr tablet     VITAMIN D3 25 MCG (1000 UT) tablet     Facility-Administered Medications Ordered in Other Encounters   Medication     lidocaine 1 % 9 mL     sodium bicarbonate 8.4 % injection 1 mEq        Allergies   Allergen Reactions     Bee Venom Swelling     Azithromycin Diarrhea     Erythromycin      Other reaction(s): GI intolerance, Vomiting     Fentanyl Other (See Comments)     sweating  sweating     Prochlorperazine Fatigue     Other reaction(s): Other (see comments)  Fatigue     Buspirone      Other reaction(s): GI intolerance  vomiting     Erythrocin Nausea and Vomiting     Zithromax [Azithromycin Dihydrate] Diarrhea     Enbrel [Etanercept] Hives and Rash       Review of Systems   Constitutional: Negative for fever.   Gastrointestinal: Positive for blood in stool, diarrhea and nausea.   Neurological: Positive for dizziness.   All other systems reviewed and are negative.    A complete review of systems was performed with pertinent positives and negatives noted in the HPI, and all other systems negative.    Physical Exam   BP:  "124/72  Pulse: 83  Temp: 97.8  F (36.6  C)  Resp: 16  Height: 160 cm (5' 3\")  Weight: 111.6 kg (246 lb)  SpO2: 98 %  Physical Exam  Vitals signs and nursing note reviewed.   Constitutional:       General: She is not in acute distress.     Appearance: She is well-developed. She is not ill-appearing, toxic-appearing or diaphoretic.      Comments: Patient is awake and alert, she appears quite uncomfortable.  She is anxious and tearful.  She is otherwise mentating normally and protecting her airway without difficulty.   HENT:      Head: Normocephalic and atraumatic.      Mouth/Throat:      Lips: Pink.      Mouth: Mucous membranes are moist.      Pharynx: Oropharynx is clear. No oropharyngeal exudate.   Eyes:      General: Lids are normal. No scleral icterus.     Extraocular Movements: Extraocular movements intact.      Right eye: No nystagmus.      Left eye: No nystagmus.      Conjunctiva/sclera: Conjunctivae normal.      Pupils: Pupils are equal, round, and reactive to light.   Neck:      Musculoskeletal: Normal range of motion and neck supple. No erythema or neck rigidity.      Thyroid: No thyromegaly.      Vascular: No JVD.      Trachea: No tracheal deviation.   Cardiovascular:      Rate and Rhythm: Normal rate and regular rhythm.      Pulses: Normal pulses.      Heart sounds: Normal heart sounds. No murmur. No friction rub. No gallop.    Pulmonary:      Effort: Pulmonary effort is normal. No respiratory distress.      Breath sounds: Normal breath sounds.   Abdominal:      General: Bowel sounds are normal. There is no distension.      Palpations: Abdomen is soft. There is no mass.      Tenderness: There is abdominal tenderness ( Mild tenderness to palpation without peritoneal signs) in the right lower quadrant, suprapubic area and left lower quadrant. There is no right CVA tenderness, left CVA tenderness, guarding or rebound.   Musculoskeletal: Normal range of motion.         General: No tenderness.      Right lower " leg: No edema.      Left lower leg: No edema.   Lymphadenopathy:      Cervical: No cervical adenopathy.   Skin:     General: Skin is warm and dry.      Capillary Refill: Capillary refill takes less than 2 seconds.      Coloration: Skin is not pale.      Findings: No erythema or rash.   Neurological:      Mental Status: She is alert and oriented to person, place, and time.      Cranial Nerves: No cranial nerve deficit.      Sensory: No sensory deficit.      Motor: Motor function is intact.   Psychiatric:         Mood and Affect: Mood is anxious. Affect is tearful.         Speech: Speech normal.         Behavior: Behavior normal.         ED Course    12:53 PM  The patient was seen and examined by Kofi Robbins MD in Room ED25.     Procedures               Results for orders placed or performed during the hospital encounter of 05/06/21   Double Lumen Midline Placement     Status: None    Narrative    Hayde Johansen RN     5/6/2021  3:49 PM  [facility]    Double Lumen Midline Placement    Date/Time: 5/6/2021 3:39 PM  Performed by: Hayde Johansen RN  Authorized by: Kofi Robbins MD   Indications: vascular access    UNIVERSAL PROTOCOL   Site Marked: Yes  Prior Images Obtained and Reviewed:  Yes  Required items: Required blood products, implants, devices and special   equipment available    Patient identity confirmed:  Verbally with patient  Patient was reevaluated immediately before administering moderate or deep   sedation or anesthesia  Confirmation Checklist:  Patient's identity using two indicators, relevant   allergies, procedure was appropriate and matched the consent or emergent   situation and correct equipment/implants were available  Time out: Immediately prior to the procedure a time out was called    Universal Protocol: the Joint Commission Universal Protocol was followed    Preparation: Patient was prepped and draped in usual  sterile fashion           ANESTHESIA    Anesthesia: See MAR for details  Local Anesthetic:  Lidocaine 1% without epinephrine  Anesthetic Total (mL):  3.5      SEDATION    Patient Sedated: No        Preparation: skin prepped with ChloraPrep  Skin prep agent: skin prep agent completely dried prior to procedure  Sterile barriers: maximum sterile barriers were used: cap, mask, sterile   gown, sterile gloves, and large sterile sheet  Hand hygiene: hand hygiene performed prior to central venous catheter   insertion  Type of line used: Midline  Catheter type: double lumen  Lumen type: non-valved  Catheter size: 5 Fr  Brand: Bard  Lot number: [id number]  Placement method: venipuncture, MST and ultrasound  Number of attempts: 1  Successful placement: yes  Orientation: right  Location: brachial vein (medial) (0.39 cm vein diameter)  Arm circumference: adults 10 cm  Extremity circumference: 39  Visible catheter length: 0  Total catheter length: 17  Dressing and securement: blood cleaned with CHG, securement device, site   cleaned, statlock and chlorhexidine disc applied  PROCEDURE   Patient Tolerance:  Patient tolerated the procedure well with no immediate   complications       CBC with platelets differential     Status: Abnormal   Result Value Ref Range    WBC 28.3 (H) 4.0 - 11.0 10e9/L    RBC Count 4.41 3.8 - 5.2 10e12/L    Hemoglobin 14.0 11.7 - 15.7 g/dL    Hematocrit 41.8 35.0 - 47.0 %    MCV 95 78 - 100 fl    MCH 31.7 26.5 - 33.0 pg    MCHC 33.5 31.5 - 36.5 g/dL    RDW 12.9 10.0 - 15.0 %    Platelet Count 308 150 - 450 10e9/L    Diff Method Automated Method     % Neutrophils 92.1 %    % Lymphocytes 3.8 %    % Monocytes 1.9 %    % Eosinophils 0.1 %    % Basophils 0.2 %    % Immature Granulocytes 1.9 %    Nucleated RBCs 0 0 /100    Absolute Neutrophil 26.0 (H) 1.6 - 8.3 10e9/L    Absolute Lymphocytes 1.1 0.8 - 5.3 10e9/L    Absolute Monocytes 0.5 0.0 - 1.3 10e9/L    Absolute Eosinophils 0.0 0.0 - 0.7 10e9/L    Absolute  Basophils 0.1 0.0 - 0.2 10e9/L    Abs Immature Granulocytes 0.5 (H) 0 - 0.4 10e9/L    Absolute Nucleated RBC 0.0    INR     Status: None   Result Value Ref Range    INR 0.96 0.86 - 1.14   Partial thromboplastin time     Status: None   Result Value Ref Range    PTT 23 22 - 37 sec   Comprehensive metabolic panel     Status: Abnormal   Result Value Ref Range    Sodium 138 133 - 144 mmol/L    Potassium 4.2 3.4 - 5.3 mmol/L    Chloride 105 94 - 109 mmol/L    Carbon Dioxide 24 20 - 32 mmol/L    Anion Gap 9 3 - 14 mmol/L    Glucose 149 (H) 70 - 99 mg/dL    Urea Nitrogen 19 7 - 30 mg/dL    Creatinine 0.66 0.52 - 1.04 mg/dL    GFR Estimate >90 >60 mL/min/[1.73_m2]    GFR Estimate If Black >90 >60 mL/min/[1.73_m2]    Calcium 8.3 (L) 8.5 - 10.1 mg/dL    Bilirubin Total 0.2 0.2 - 1.3 mg/dL    Albumin 3.4 3.4 - 5.0 g/dL    Protein Total 6.7 (L) 6.8 - 8.8 g/dL    Alkaline Phosphatase 52 40 - 150 U/L    ALT 91 (H) 0 - 50 U/L    AST 21 0 - 45 U/L   CRP inflammation     Status: None   Result Value Ref Range    CRP Inflammation 5.7 0.0 - 8.0 mg/L   Erythrocyte sedimentation rate auto     Status: None   Result Value Ref Range    Sed Rate 6 0 - 20 mm/h   Lactic acid whole blood     Status: Abnormal   Result Value Ref Range    Lactic Acid 4.0 (HH) 0.7 - 2.0 mmol/L   Magnesium     Status: Abnormal   Result Value Ref Range    Magnesium 2.6 (H) 1.6 - 2.3 mg/dL   Phosphorus     Status: None   Result Value Ref Range    Phosphorus 2.7 2.5 - 4.5 mg/dL   Blood culture     Status: None (Preliminary result)    Specimen: Blood    PURPLE PORT   Result Value Ref Range    Specimen Description Blood PURPLE PORT     Culture Micro PENDING      Medications   0.9% sodium chloride BOLUS (0 mLs Intravenous Stopped 5/6/21 1620)     Followed by   sodium chloride 0.9% infusion ( Intravenous New Bag 5/6/21 1615)   lidocaine 1 % 0.1-1 mL (has no administration in time range)   lidocaine (LMX4) cream (has no administration in time range)   sodium chloride (PF)  0.9% PF flush 10-20 mL (has no administration in time range)   sodium chloride (PF) 0.9% PF flush 10 mL ( Intracatheter Canceled Entry 5/6/21 1511)   lidocaine 1 % 0.1-1 mL (has no administration in time range)   lidocaine (LMX4) cream (has no administration in time range)   sodium chloride (PF) 0.9% PF flush 10-20 mL (has no administration in time range)   sodium chloride (PF) 0.9% PF flush 10 mL (10 mLs Intracatheter Given 5/6/21 1615)   heparin lock flush 10 UNIT/ML injection 5-15 mL (has no administration in time range)   heparin lock flush 10 UNIT/ML injection 5 mL (has no administration in time range)   lactated ringers BOLUS 1,000 mL (1,000 mLs Intravenous New Bag 5/6/21 1624)   glucose gel 15-30 g (has no administration in time range)     Or   dextrose 50 % injection 25-50 mL (has no administration in time range)     Or   glucagon injection 1 mg (has no administration in time range)   insulin aspart (NovoLOG) injection (RAPID ACTING) (1 Units Subcutaneous Not Given 5/6/21 1629)   pantoprazole (PROTONIX) IV push injection 40 mg (has no administration in time range)   piperacillin-tazobactam (ZOSYN) 4.5 g vial to attach to  mL bag (has no administration in time range)   HYDROmorphone (DILAUDID) injection 1 mg (1 mg Intravenous Given 5/6/21 1404)   iopamidol (ISOVUE-370) solution 135 mL (135 mLs Intravenous Given 5/6/21 1634)   sodium chloride (PF) 0.9% PF flush 84 mL (84 mLs Intravenous Given 5/6/21 1634)   lidocaine (XYLOCAINE) 2 % 15 mL, alum & mag hydroxide-simethicone (MAALOX) 15 mL GI Cocktail (30 mLs Oral Given 5/6/21 1624)        Assessments & Plan (with Medical Decision Making)       This patient presented to the emergency department with worsening rectal bleeding and abdominal pain.  She had recently been hospitalized with a ulcerative colitis flare and also was treated at that time for C. difficile colitis.  Currently she is on prednisone 40 mg a day.  On exam here she appears quite  uncomfortable.  Vital signs were within normal limits and she was not febrile.  Her abdominal exam demonstrated some lower abdominal tenderness without rebound or guarding. Lactate is elevated at 4 and she has a significant leukocytosis.  This could be secondary to prednisone use but could also be secondary to infectious etiology or C. Difficile. I did order a midline to be placed as vascular access was difficult in  the patient and she had only a 20-gauge IV.  Fluids were started and she was given pain medication.  I consulted with GI and at this point will hold on broad-spectrum antibiotics as there is no clear source and feel that much of this might be her volume depletion and UC flare.  With her history of C. difficile I want to avoid unnecessary antibiotic treatment but if the patient does spike a fever or obvious source is found out we will start broad-spectrum antibiotic.  Currently she is meeting 1 SIRS criteria with her white blood cell count.  We will obtain CTA of the chest abdomen pelvis and I have also spoken with the triage hospitalist.  Patient will be admitted to the internal medicine service for further treatment and evaluation.  This part of the medical record was transcribed by Saumya Alvarez, Medical Scribe, from a dictation done by Kofi Robbins MD.       I have reviewed the nursing notes. I have reviewed the findings, diagnosis, plan and need for follow up with the patient.    New Prescriptions    No medications on file       Final diagnoses:   Colitis   Rectal bleeding     I, Saumya Alvarez, am serving as a trained medical scribe to document services personally performed by Kofi Robbins MD based on the provider's statements to me on May 6, 2021.  This document has been checked and approved by the attending provider.    I, Kofi Robbins MD, was physically present and have reviewed and verified the accuracy of this note documented by tonja Spencer  damon.      --  Kofi Robbins MD  [facility] EMERGENCY DEPARTMENT  5/6/2021     Kofi Robbins MD  05/07/21 0802

## 2021-05-06 NOTE — PROGRESS NOTES
Clinic Care Coordination Contact  Community Health Worker Initial Outreach    Patient accepts CC: Yes. Patient scheduled for assessment with RN CC on 5/6 at 11am. Patient noted desire to discuss support and resources available to her.     The Clinic Community Health Worker spoke with the patient today to discuss possible Clinic Care Coordination enrollment. The service was described to the patient and immediate needs were discussed. The patient agreed to enrollment and an assessment was scheduled. The patient was provided with contact information for the clinic CHW.             Assessment date: 5/6 @11am    Yesika MCCORMICK  Community Health Worker  [facility] Care Coordination  [facility]  [email or web address]   Office: [phone]

## 2021-05-06 NOTE — PROCEDURES
[facility]    Double Lumen Midline Placement    Date/Time: 5/6/2021 3:39 PM  Performed by: Hayde Johansen RN  Authorized by: Kofi Robbins MD   Indications: vascular access    UNIVERSAL PROTOCOL   Site Marked: Yes  Prior Images Obtained and Reviewed:  Yes  Required items: Required blood products, implants, devices and special equipment available    Patient identity confirmed:  Verbally with patient  Patient was reevaluated immediately before administering moderate or deep sedation or anesthesia  Confirmation Checklist:  Patient's identity using two indicators, relevant allergies, procedure was appropriate and matched the consent or emergent situation and correct equipment/implants were available  Time out: Immediately prior to the procedure a time out was called    Universal Protocol: the Joint Commission Universal Protocol was followed    Preparation: Patient was prepped and draped in usual sterile fashion           ANESTHESIA    Anesthesia: See MAR for details  Local Anesthetic:  Lidocaine 1% without epinephrine  Anesthetic Total (mL):  3.5      SEDATION    Patient Sedated: No        Preparation: skin prepped with ChloraPrep  Skin prep agent: skin prep agent completely dried prior to procedure  Sterile barriers: maximum sterile barriers were used: cap, mask, sterile gown, sterile gloves, and large sterile sheet  Hand hygiene: hand hygiene performed prior to central venous catheter insertion  Type of line used: Midline  Catheter type: double lumen  Lumen type: non-valved  Catheter size: 5 Fr  Brand: Bard  Lot number: [id number]  Placement method: venipuncture, MST and ultrasound  Number of attempts: 1  Successful placement: yes  Orientation: right  Location: brachial vein (medial) (0.39 cm vein diameter)  Arm circumference: adults 10 cm  Extremity circumference: 39  Visible catheter length: 0  Total catheter length: 17  Dressing and securement: blood  cleaned with CHG, securement device, site cleaned, statlock and chlorhexidine disc applied  PROCEDURE   Patient Tolerance:  Patient tolerated the procedure well with no immediate complications

## 2021-05-06 NOTE — H&P
[facility]    History and Physical - Hospitalist Service, [facility]        Date of Admission:  5/6/2021    Assessment & Plan   Doretha Fernandez is a 46 year old female admitted on 5/6/2021. She has PMH of melanoma w/ nivolumab induced colitis s/p treatment with infliximab and vedolizumab, hx of giardia and C.diff infection (most recently 4/7/21), seronegative RA on prednisone and tocilizumab, Steroid induced hyperglycemia, CVA w/ mild left-sides weakness (2004), HLD, HTN, and Depression  w/ recent admission 4/26-5/3/21 with new UC diagnosis who presents to the ED for evaluation of recurrent abdominal pain and hematochezia. Patient admitted to Medicine w/ GI consult.      ## Acute flare of ulcerative proctocolitis  ## Leukocytosis  ## Lactic Acidosis: Patient presents with~ 12 hour hx of LLQ abdominal pain, hematochezia, clot, and mucus. No fever, though reports feeling chilled.  Currently on prednisone taper (maintains on 40 mg of prednisone daily).  LA 4.0, WBC 28.3 w/ predominance of neutrophil (susepct a component of steroid induced elevation, though also consider infectious etiology), CRP 5.7,  Hgb stable at 14.0 . INR 0.96, Platelets 308. VSS. GI consulted on admission. Per chart review, patient received high dose steroids (1g solumedrol) and ultimately Entyvyo in 2017/2018 for severe diarrhea/colitis thought s/t nivolumab therapy. Bactrim prophy while on steroids. Bentyl PTA.  - Bactrim held  - Did not order Bentyl  - CT A/P w/ contrast  - IV steroids if infectious w/u non-revealing   - IVF bolus and maintainence  - GI consult appreciate assistance and recommendations  - CRS surgery consult. Appreciate assistance and recommendations  - BCx2  - UA  - Enteric panel  - C. Diff PCR  - Enteric precautions  - NPO at MN  - Continue Zosyn fr now  - IV PPI  -Two tap water enema in AM for possible Flex Sig  - CBC, CMP in am  - Daily CRP    ## Atypical CP: Patient  "reports that she has andreas experiencing \"chest fullness\" and a burning sensation in her epigastric region since before recent admission 4/26/21. No aggravating or alleviating factors PTA. VSS on admission. No O2 use. CTPE neg. Received GI cocktail in ED with mild improvement in sx  - EKD  - Trop  - GI cocktail RN     ## Seronegative RA: Follows with Rheumatology at [facility], DR. Brady. Previously  maintained on tocilizumab. Reports prednisone taper since 8/2020- was on 5 mg prednisone daily prior to recent UC diagnosis.   - F/u with Rheumatology as scheduled    ## Steroid induced hyperglycemia: A1c 6.1 (4/5/21). BG stable on admission. PTA metformin  - HOLD metformin  - LSSI q4h while NPO  - BG checks q4h     ## HTN: BP mildly elevated on admission: PTA Norvasc. Favor IV medications PRN as with lactic acidosis and possible infectious etiologyu  - HOLD    ## HLD: PTA statin  - Did not order     ## Anxiety: Severe in setting of a/c medical, reports of challenges with teenage daughters PTA, and concern for job loss with recent sick leave/abscence. PTA Effexor  - PRN IV ativan  - DID not order tonight  - Continue to provide support  - May benefit from health Phycology consult     ## Hx of Malignant Melanoma: Dx 11/2017. Follows with Dr. Giron at [facility]. C/b check point inhibitor colitis s/p treatment with  infliximab and vedolizumab. Now in remission   - Monitor   - F/u with Oncology as schedduled    ## Hx of recurrent C. Diff infection: Positive PCR at outside facility 4/7- on treatment w/ PO Vancomycin taper  - Continue BID PO Vancomycin dosing while on abx       Diet:  NPO for now  DVT Prophylaxis: Pneumatic Compression Devices  Major Catheter: not present  Code Status:  Full Code         Disposition Plan   Expected discharge: 2 - 3 days, recommended to prior living arrangement once GI and CRS consultation w/ possible intervention .  Entered: Clementine Mann PA-C 05/06/2021, 3:41 PM     The patient's care " was discussed with the Attending Physician, Dr. Irvin.    [facility]  Contact information available via [facility] Paging/Directory  Please see sign in/sign out for up to date coverage information    ______________________________________________________________________    Chief Complaint   Abdominal pain, hematochezia    History is obtained from the patient and EMR    History of Present Illness   Doretha Fernandez is a 46 year old female who presents to the ED for evaluation of abdominal pain, hematochezia. Patient reports that since her recent admission 4/26-5/3, he has been experiencing bloody stools w/ mucus and clot, though since 2am this morning (5/6/21) she has noted an increase in stools and LLQ abdominal pain. Patient reports that she has not experienced this may stools previously and is having trouble controlling them. She is also experiencing diffuse body aches, Epigastric pain, chest fullness, and chills. She denies fever, dysuria, previous cardiac disease, DVT/PE. No recent travel or reports of known sick contacts. Patient is also reporting significant anxiety regarding recent diagnosis of UC. She is tearful and is afraid that she will lose her job because she has no more FMLA to use. WE discussed POC as outlined above and patient agreeable.     Review of Systems    The 10 point Review of Systems is negative other than noted in the HPI or here.     Past Medical History    I have reviewed this patient's medical history and updated it with pertinent information if needed.   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      Anxiety      Basal cell carcinoma      Cervical high risk HPV (human papillomavirus) test positive 12/13/2019    See problem list     Colitis      Depression      Diabetes mellitus, iatrogenic (H) 1/28/2020     Inflammatory arthritis      Insomnia       Intestinal giardiasis 3/5/2018     Lumbago     left lower back pain     Lymphedema      Malignant melanoma (H)      Melanoma (H) 10/23/2017     Mild persistent asthma      Obesity      STORMY (obstructive sleep apnea)      Prothrombin deficiency (H)     takes 81mg asa daily     Stroke (cerebrum) (H)     During      TIA (transient ischemic attack)      Type 2 diabetes mellitus (H)        Past Surgical History   I have reviewed this patient's surgical history and updated it with pertinent information if needed.  Past Surgical History:   Procedure Laterality Date     APPENDECTOMY       COLONOSCOPY N/A 10/18/2017    Procedure: COLONOSCOPY;  Colon;  Surgeon: Debbie Stephens MD;  [facility]     COLONOSCOPY N/A 3/9/2018    Procedure: COMBINED COLONOSCOPY, SINGLE OR MULTIPLE BIOPSY/POLYPECTOMY BY BIOPSY;  colon;  Surgeon: Benita Schumacher MD;  Location: [facility] GI     COLONOSCOPY      multiple since  to present - about 6 total     DISSECT LYMPH NODE AXILLA Left 10/23/2017    Procedure: DISSECT LYMPH NODE AXILLA;  Left Axillary Lymph Node Dissection ;  Surgeon: Laurent Cool MD;  Location: [facility] OR     EXAM UNDER ANESTHESIA PELVIC N/A 2020    Procedure: EXAM UNDER ANESTHESIA, PELVIS; with Cervical Biopsies, Vaginal Biopsy and Endocervical Curettings;  Surgeon: Melina Jugn MD;  Location: [facility] OR     GYN SURGERY  ,          REPAIR MOHS Left 2017    Procedure: REPAIR MOHS;  Left Upper Lid Moh's Reconstruction;  Surgeon: Kisha Bosch MD;  [facility]       Social History   I have reviewed this patient's social history and updated it with pertinent information if needed.  Social History     Tobacco Use     Smoking status: Former Smoker     Packs/day: 1.00     Years: 5.00     Pack years: 5.00     Quit date: 3/20/1998     Years since quittin.1     Smokeless tobacco: Never Used   Substance Use Topics     Alcohol use: Yes     Comment: occ     Drug use: No       Family  History   I have reviewed this patient's family history and updated it with pertinent information if needed.  Family History   Problem Relation Age of Onset     Cancer Mother 45        lung     Neurologic Disorder Mother         epilepsy     Lipids Father      Gastrointestinal Disease Father         diverticulitis      Depression Father      Cancer Maternal Grandmother      Blood Disease Maternal Grandmother         lymphoma      Arthritis Maternal Grandmother      Diabetes Maternal Grandmother      Depression Maternal Grandmother      Macular Degeneration Maternal Grandmother      Glaucoma Maternal Grandmother      Diabetes Maternal Grandfather      Cerebrovascular Disease Maternal Grandfather      Blood Disease Maternal Grandfather      Heart Disease Maternal Grandfather      Glaucoma Maternal Grandfather      Cancer Paternal Grandmother      Cancer - colorectal Paternal Grandmother      Respiratory Paternal Grandfather         emphysema      Heart Disease Daughter      Asthma Daughter      Depression Sister      Melanoma No family hx of        Prior to Admission Medications   Prior to Admission Medications   Prescriptions Last Dose Informant Patient Reported? Taking?   ACCU-CHEK GUIDE test strip   No No   Sig: USE TO TEST BLOOD SUGAR TWO TIMES A DAY OR AS DIRECTED   Calcium Carb-Cholecalciferol 600-800 MG-UNIT TABS   Yes No   Sig: Take 800 mg by mouth daily before breakfast   VITAMIN D3 25 MCG (1000 UT) tablet   No No   Sig: TAKE THREE TABLETS BY MOUTH ONCE DAILY   acetaminophen (TYLENOL) 500 MG tablet   Yes No   Sig: Take 1,000 mg by mouth every 8 hours as needed for mild pain   amLODIPine (NORVASC) 5 MG tablet   No No   Sig: Take 1 tablet (5 mg) by mouth daily   blood glucose monitoring (ACCU-CHEK FASTCLIX) lancets   No No   Sig: USE TWO TIMES A DAY OR AS DIRECTED   calcium carbonate (TUMS) 500 MG chewable tablet   No No   Sig: Take 1 tablet (500 mg) by mouth 3 times daily as needed for heartburn   dicyclomine  (BENTYL) 20 MG tablet   No No   Sig: Take 1 tablet (20 mg) by mouth 3 times daily (with meals)   famotidine (PEPCID) 10 MG tablet   No No   Sig: Take 1 tablet (10 mg) by mouth 2 times daily   ferrous fumarate 65 mg, [place]. FE,-Vitamin C 125 mg (VITRON-C)  MG TABS tablet   No No   Sig: Take 1 tablet by mouth daily   gabapentin (NEURONTIN) 300 MG capsule   No No   Sig: TAKE THREE CAPSULES BY MOUTH FOUR TIMES A DAY   hydrOXYzine (ATARAX) 25 MG tablet   No No   Sig: TAKE ONE TABLET BY MOUTH AT BEDTIME   Patient taking differently: As needed   hydrocortisone-pramoxine (PROCTOFOAM-HC) rectal foam   No No   Sig: Place 1 Applicatorful rectally 2 times daily   metFORMIN (GLUCOPHAGE-XR) 500 MG 24 hr tablet   Yes No   Sig: Take 1,000 mg by mouth Daily with breakfast.   ondansetron (ZOFRAN) 8 MG tablet  Self Yes No   Sig: Take 8 mg by mouth every 8 hours as needed for nausea (Pt has not taken in past year 2/10/20)    pantoprazole (PROTONIX) 40 MG EC tablet   No No   Sig: Take 1 tablet (40 mg) by mouth every morning (before breakfast)   predniSONE (DELTASONE) 5 MG tablet   No No   Sig: Take 8 tablets (40 mg) by mouth daily   rosuvastatin (CRESTOR) 10 MG tablet   No No   Sig: Take 1 tablet (10 mg) by mouth daily   sulfamethoxazole-trimethoprim (BACTRIM DS) 800-160 MG tablet   No No   Sig: Take 1 tablet by mouth daily   vancomycin (VANCOCIN) 125 MG capsule   No No   Sig: Take 1 capsule (125 mg) by mouth 4 times daily for 3 days, THEN 1 capsule (125 mg) 2 times daily for 7 days, THEN 1 capsule (125 mg) daily for 7 days, THEN 1 capsule (125 mg) every other day for 14 days.   venlafaxine (EFFEXOR-ER) 225 MG 24 hr tablet   No No   Sig: TAKE ONE TABLET BY MOUTH ONCE DAILY      Facility-Administered Medications: None     Allergies   Allergies   Allergen Reactions     Bee Venom Swelling     Azithromycin Diarrhea     Erythromycin      Other reaction(s): GI intolerance, Vomiting     Fentanyl Other (See Comments)     sweating  sweating      Prochlorperazine Fatigue     Other reaction(s): Other (see comments)  Fatigue     Buspirone      Other reaction(s): GI intolerance  vomiting     Erythrocin Nausea and Vomiting     Zithromax [Azithromycin Dihydrate] Diarrhea     Enbrel [Etanercept] Hives and Rash       Physical Exam   Vital Signs: Temp: 98.7  F (37.1  C) Temp src: Oral BP: 133/84 Pulse: 89   Resp: 12 SpO2: 95 % O2 Device: None (Room air)    Weight: 246 lbs 0 oz      Physical Exam   Constitutional: Pleasant female lying in bed. Tearful. Appears anxious.  Well nourished, well developed  HEENT:   Head: Normocephalic and atraumatic.   Eyes: Conjunctivae are normal. Pupils are equal, round, and reactive to light.  Pharynx has no erythema or exudate, mucous membranes are moist  Neck:   No adenopathy, no bony tenderness  Cardiovascular: Regular rate and rhythm without murmurs or gallops  Pulmonary/Chest: Clear to auscultation bilaterally, with no wheezes or retractions. No respiratory distress.  GI: Soft with good bowel sounds.  Mild LLQ TTP. Non-distended, with no guarding, no rebound, no peritoneal signs.   Back:  No bony or CVA tenderness   Musculoskeletal:  No edema or clubbing   Skin: Skin is warm and dry. No rash noted to exposed skin areas.   Neurological: Alert and oriented to person, place, and time. Nonfocal exam  Psychiatric:  Anxious, tearful      Data   Data reviewed today: I reviewed all medications, new labs and imaging results over the last 24 hours. I personally reviewed recent imaging impression, daily labs, progress notes      Recent Labs   Lab 05/06/21  1356 05/03/21  0956 05/02/21  0836 05/01/21  1302 04/30/21  1705   WBC 28.3* 20.3* 20.7* 20.0* 19.3*   HGB 14.0 13.7 14.1 12.9 13.3   MCV 95 95 93 94 93    307 331 305 307   INR 0.96  --   --   --   --      --   --  136 137   POTASSIUM 4.2  --   --  4.2 3.8   CHLORIDE 105  --   --  105 105   CO2 24  --   --  26 26   BUN 19  --   --  23 22   CR 0.66  --  0.70 0.68 0.66    ANIONGAP 9  --   --  6 6   PATRICIA 8.3*  --   --  8.9 9.4   *  --   --  319* 196*   ALBUMIN 3.4  --   --  3.3* 3.7   PROTTOTAL 6.7*  --   --  6.4* 7.2   BILITOTAL 0.2  --   --  0.2 0.1*   ALKPHOS 52  --   --  53 60   ALT 91*  --   --  140* 158*   AST 21  --   --  36 72*

## 2021-05-07 ENCOUNTER — PATIENT OUTREACH (OUTPATIENT)
Dept: GASTROENTEROLOGY | Facility: CLINIC | Age: 46
End: 2021-05-07

## 2021-05-07 LAB
ALBUMIN SERPL-MCNC: 2.8 G/DL (ref 3.4–5)
ALP SERPL-CCNC: 42 U/L (ref 40–150)
ALT SERPL W P-5'-P-CCNC: 63 U/L (ref 0–50)
ANION GAP SERPL CALCULATED.3IONS-SCNC: 6 MMOL/L (ref 3–14)
AST SERPL W P-5'-P-CCNC: 10 U/L (ref 0–45)
BILIRUB SERPL-MCNC: 0.5 MG/DL (ref 0.2–1.3)
BUN SERPL-MCNC: 14 MG/DL (ref 7–30)
C COLI+JEJUNI+LARI FUSA STL QL NAA+PROBE: NOT DETECTED
C DIFF TOX B STL QL: NEGATIVE
CALCIUM SERPL-MCNC: 7.9 MG/DL (ref 8.5–10.1)
CHLORIDE SERPL-SCNC: 106 MMOL/L (ref 94–109)
CO2 SERPL-SCNC: 28 MMOL/L (ref 20–32)
CREAT SERPL-MCNC: 0.63 MG/DL (ref 0.52–1.04)
EC STX1 GENE STL QL NAA+PROBE: NOT DETECTED
EC STX2 GENE STL QL NAA+PROBE: NOT DETECTED
ENTERIC PATHOGEN COMMENT: NORMAL
ERYTHROCYTE [DISTWIDTH] IN BLOOD BY AUTOMATED COUNT: 13.1 % (ref 10–15)
FLEXIBLE SIGMOIDOSCOPY: NORMAL
GFR SERPL CREATININE-BSD FRML MDRD: >90 ML/MIN/{1.73_M2}
GLUCOSE BLDC GLUCOMTR-MCNC: 146 MG/DL (ref 70–99)
GLUCOSE BLDC GLUCOMTR-MCNC: 178 MG/DL (ref 70–99)
GLUCOSE BLDC GLUCOMTR-MCNC: 250 MG/DL (ref 70–99)
GLUCOSE BLDC GLUCOMTR-MCNC: 78 MG/DL (ref 70–99)
GLUCOSE BLDC GLUCOMTR-MCNC: 96 MG/DL (ref 70–99)
GLUCOSE BLDC GLUCOMTR-MCNC: 97 MG/DL (ref 70–99)
GLUCOSE BLDC GLUCOMTR-MCNC: 98 MG/DL (ref 70–99)
GLUCOSE SERPL-MCNC: 93 MG/DL (ref 70–99)
HCT VFR BLD AUTO: 35.5 % (ref 35–47)
HGB BLD-MCNC: 11.5 G/DL (ref 11.7–15.7)
MCH RBC QN AUTO: 30.5 PG (ref 26.5–33)
MCHC RBC AUTO-ENTMCNC: 32.4 G/DL (ref 31.5–36.5)
MCV RBC AUTO: 94 FL (ref 78–100)
NOROV GI+II ORF1-ORF2 JNC STL QL NAA+PR: NOT DETECTED
PLATELET # BLD AUTO: 247 10E9/L (ref 150–450)
POTASSIUM SERPL-SCNC: 4 MMOL/L (ref 3.4–5.3)
PROT SERPL-MCNC: 5.4 G/DL (ref 6.8–8.8)
RBC # BLD AUTO: 3.77 10E12/L (ref 3.8–5.2)
RVA NSP5 STL QL NAA+PROBE: NOT DETECTED
SALMONELLA SP RPOD STL QL NAA+PROBE: NOT DETECTED
SHIGELLA SP+EIEC IPAH STL QL NAA+PROBE: NOT DETECTED
SODIUM SERPL-SCNC: 140 MMOL/L (ref 133–144)
SPECIMEN SOURCE: NORMAL
V CHOL+PARA RFBL+TRKH+TNAA STL QL NAA+PR: NOT DETECTED
WBC # BLD AUTO: 13.4 10E9/L (ref 4–11)
Y ENTERO RECN STL QL NAA+PROBE: NOT DETECTED

## 2021-05-07 PROCEDURE — 87506 IADNA-DNA/RNA PROBE TQ 6-11: CPT | Performed by: NURSE PRACTITIONER

## 2021-05-07 PROCEDURE — 45331 SIGMOIDOSCOPY AND BIOPSY: CPT | Performed by: INTERNAL MEDICINE

## 2021-05-07 PROCEDURE — 36592 COLLECT BLOOD FROM PICC: CPT | Performed by: PHYSICIAN ASSISTANT

## 2021-05-07 PROCEDURE — 88312 SPECIAL STAINS GROUP 1: CPT | Mod: TC | Performed by: INTERNAL MEDICINE

## 2021-05-07 PROCEDURE — 0DBN8ZX EXCISION OF SIGMOID COLON, VIA NATURAL OR ARTIFICIAL OPENING ENDOSCOPIC, DIAGNOSTIC: ICD-10-PCS | Performed by: INTERNAL MEDICINE

## 2021-05-07 PROCEDURE — 250N000013 HC RX MED GY IP 250 OP 250 PS 637: Performed by: PHYSICIAN ASSISTANT

## 2021-05-07 PROCEDURE — 999N001017 HC STATISTIC GLUCOSE BY METER IP

## 2021-05-07 PROCEDURE — 99233 SBSQ HOSP IP/OBS HIGH 50: CPT | Performed by: STUDENT IN AN ORGANIZED HEALTH CARE EDUCATION/TRAINING PROGRAM

## 2021-05-07 PROCEDURE — 120N000011 HC R&B TRANSPLANT UMMC

## 2021-05-07 PROCEDURE — 250N000011 HC RX IP 250 OP 636: Performed by: EMERGENCY MEDICINE

## 2021-05-07 PROCEDURE — 88312 SPECIAL STAINS GROUP 1: CPT | Mod: 26 | Performed by: PATHOLOGY

## 2021-05-07 PROCEDURE — 88342 IMHCHEM/IMCYTCHM 1ST ANTB: CPT | Mod: 26 | Performed by: PATHOLOGY

## 2021-05-07 PROCEDURE — 88341 IMHCHEM/IMCYTCHM EA ADD ANTB: CPT | Mod: TC | Performed by: INTERNAL MEDICINE

## 2021-05-07 PROCEDURE — 88342 IMHCHEM/IMCYTCHM 1ST ANTB: CPT | Mod: TC | Performed by: INTERNAL MEDICINE

## 2021-05-07 PROCEDURE — 250N000013 HC RX MED GY IP 250 OP 250 PS 637: Performed by: STUDENT IN AN ORGANIZED HEALTH CARE EDUCATION/TRAINING PROGRAM

## 2021-05-07 PROCEDURE — 88305 TISSUE EXAM BY PATHOLOGIST: CPT | Mod: 26 | Performed by: PATHOLOGY

## 2021-05-07 PROCEDURE — 87493 C DIFF AMPLIFIED PROBE: CPT | Performed by: NURSE PRACTITIONER

## 2021-05-07 PROCEDURE — 250N000011 HC RX IP 250 OP 636: Performed by: INTERNAL MEDICINE

## 2021-05-07 PROCEDURE — 250N000011 HC RX IP 250 OP 636: Performed by: STUDENT IN AN ORGANIZED HEALTH CARE EDUCATION/TRAINING PROGRAM

## 2021-05-07 PROCEDURE — 85027 COMPLETE CBC AUTOMATED: CPT | Performed by: PHYSICIAN ASSISTANT

## 2021-05-07 PROCEDURE — C9113 INJ PANTOPRAZOLE SODIUM, VIA: HCPCS | Performed by: PHYSICIAN ASSISTANT

## 2021-05-07 PROCEDURE — G0500 MOD SEDAT ENDO SERVICE >5YRS: HCPCS | Performed by: INTERNAL MEDICINE

## 2021-05-07 PROCEDURE — 250N000012 HC RX MED GY IP 250 OP 636 PS 637: Performed by: PHYSICIAN ASSISTANT

## 2021-05-07 PROCEDURE — 80053 COMPREHEN METABOLIC PANEL: CPT | Performed by: PHYSICIAN ASSISTANT

## 2021-05-07 PROCEDURE — 99231 SBSQ HOSP IP/OBS SF/LOW 25: CPT | Performed by: PHYSICIAN ASSISTANT

## 2021-05-07 PROCEDURE — 258N000003 HC RX IP 258 OP 636: Performed by: PHYSICIAN ASSISTANT

## 2021-05-07 PROCEDURE — 250N000011 HC RX IP 250 OP 636: Performed by: PHYSICIAN ASSISTANT

## 2021-05-07 PROCEDURE — 88305 TISSUE EXAM BY PATHOLOGIST: CPT | Mod: TC | Performed by: INTERNAL MEDICINE

## 2021-05-07 RX ORDER — VENLAFAXINE HYDROCHLORIDE 75 MG/1
225 CAPSULE, EXTENDED RELEASE ORAL DAILY
Status: DISCONTINUED | OUTPATIENT
Start: 2021-05-07 | End: 2021-05-11 | Stop reason: HOSPADM

## 2021-05-07 RX ORDER — DICYCLOMINE HCL 20 MG
20 TABLET ORAL
Status: DISCONTINUED | OUTPATIENT
Start: 2021-05-07 | End: 2021-05-11 | Stop reason: HOSPADM

## 2021-05-07 RX ORDER — METHYLPREDNISOLONE SODIUM SUCCINATE 40 MG/ML
32 INJECTION, POWDER, LYOPHILIZED, FOR SOLUTION INTRAMUSCULAR; INTRAVENOUS EVERY 12 HOURS
Status: DISCONTINUED | OUTPATIENT
Start: 2021-05-07 | End: 2021-05-11 | Stop reason: HOSPADM

## 2021-05-07 RX ORDER — GABAPENTIN 300 MG/1
900 CAPSULE ORAL 3 TIMES DAILY
Status: DISCONTINUED | OUTPATIENT
Start: 2021-05-07 | End: 2021-05-11 | Stop reason: HOSPADM

## 2021-05-07 RX ORDER — ONDANSETRON 4 MG/1
8 TABLET, FILM COATED ORAL EVERY 8 HOURS PRN
Status: DISCONTINUED | OUTPATIENT
Start: 2021-05-07 | End: 2021-05-07

## 2021-05-07 RX ORDER — DIPHENHYDRAMINE HYDROCHLORIDE 50 MG/ML
INJECTION INTRAMUSCULAR; INTRAVENOUS PRN
Status: COMPLETED | OUTPATIENT
Start: 2021-05-07 | End: 2021-05-07

## 2021-05-07 RX ORDER — LORAZEPAM 2 MG/ML
0.5 INJECTION INTRAMUSCULAR ONCE
Status: COMPLETED | OUTPATIENT
Start: 2021-05-07 | End: 2021-05-07

## 2021-05-07 RX ORDER — PANTOPRAZOLE SODIUM 40 MG/1
40 TABLET, DELAYED RELEASE ORAL
Status: DISCONTINUED | OUTPATIENT
Start: 2021-05-08 | End: 2021-05-11 | Stop reason: HOSPADM

## 2021-05-07 RX ADMIN — HYDROMORPHONE HYDROCHLORIDE 0.5 MG: 1 INJECTION, SOLUTION INTRAMUSCULAR; INTRAVENOUS; SUBCUTANEOUS at 03:45

## 2021-05-07 RX ADMIN — VENLAFAXINE HYDROCHLORIDE 225 MG: 75 CAPSULE, EXTENDED RELEASE ORAL at 15:32

## 2021-05-07 RX ADMIN — VANCOMYCIN HYDROCHLORIDE 125 MG: 125 CAPSULE ORAL at 07:54

## 2021-05-07 RX ADMIN — INSULIN ASPART 1 UNITS: 100 INJECTION, SOLUTION INTRAVENOUS; SUBCUTANEOUS at 15:38

## 2021-05-07 RX ADMIN — PIPERACILLIN SODIUM AND TAZOBACTAM SODIUM 4.5 G: 4; .5 INJECTION, POWDER, LYOPHILIZED, FOR SOLUTION INTRAVENOUS at 03:45

## 2021-05-07 RX ADMIN — HYDROMORPHONE HYDROCHLORIDE 0.5 MG: 1 INJECTION, SOLUTION INTRAMUSCULAR; INTRAVENOUS; SUBCUTANEOUS at 23:36

## 2021-05-07 RX ADMIN — INSULIN ASPART 1 UNITS: 100 INJECTION, SOLUTION INTRAVENOUS; SUBCUTANEOUS at 23:36

## 2021-05-07 RX ADMIN — VANCOMYCIN HYDROCHLORIDE 125 MG: 125 CAPSULE ORAL at 20:28

## 2021-05-07 RX ADMIN — PANTOPRAZOLE SODIUM 40 MG: 40 INJECTION, POWDER, FOR SOLUTION INTRAVENOUS at 07:54

## 2021-05-07 RX ADMIN — ENOXAPARIN SODIUM 40 MG: 100 INJECTION SUBCUTANEOUS at 15:32

## 2021-05-07 RX ADMIN — ONDANSETRON 4 MG: 2 INJECTION INTRAMUSCULAR; INTRAVENOUS at 01:00

## 2021-05-07 RX ADMIN — DIPHENHYDRAMINE HYDROCHLORIDE 50 MG: 50 INJECTION, SOLUTION INTRAMUSCULAR; INTRAVENOUS at 09:20

## 2021-05-07 RX ADMIN — ACETAMINOPHEN 650 MG: 325 TABLET, FILM COATED ORAL at 01:00

## 2021-05-07 RX ADMIN — METHYLPREDNISOLONE SODIUM SUCCINATE 32 MG: 40 INJECTION, POWDER, FOR SOLUTION INTRAMUSCULAR; INTRAVENOUS at 12:18

## 2021-05-07 RX ADMIN — ACETAMINOPHEN 650 MG: 325 TABLET, FILM COATED ORAL at 14:16

## 2021-05-07 RX ADMIN — Medication 5 ML: at 10:55

## 2021-05-07 RX ADMIN — DICYCLOMINE HYDROCHLORIDE 20 MG: 20 TABLET ORAL at 17:41

## 2021-05-07 RX ADMIN — HYDROMORPHONE HYDROCHLORIDE 0.5 MG: 1 INJECTION, SOLUTION INTRAMUSCULAR; INTRAVENOUS; SUBCUTANEOUS at 17:52

## 2021-05-07 RX ADMIN — SODIUM CHLORIDE, POTASSIUM CHLORIDE, SODIUM LACTATE AND CALCIUM CHLORIDE: 600; 310; 30; 20 INJECTION, SOLUTION INTRAVENOUS at 11:17

## 2021-05-07 RX ADMIN — INSULIN ASPART 2 UNITS: 100 INJECTION, SOLUTION INTRAVENOUS; SUBCUTANEOUS at 20:29

## 2021-05-07 RX ADMIN — PIPERACILLIN SODIUM AND TAZOBACTAM SODIUM 4.5 G: 4; .5 INJECTION, POWDER, LYOPHILIZED, FOR SOLUTION INTRAVENOUS at 10:53

## 2021-05-07 RX ADMIN — GABAPENTIN 900 MG: 300 CAPSULE ORAL at 15:32

## 2021-05-07 RX ADMIN — HYDROMORPHONE HYDROCHLORIDE 0.5 MG: 1 INJECTION, SOLUTION INTRAMUSCULAR; INTRAVENOUS; SUBCUTANEOUS at 11:48

## 2021-05-07 RX ADMIN — Medication 1 MG: at 01:00

## 2021-05-07 RX ADMIN — GABAPENTIN 900 MG: 300 CAPSULE ORAL at 20:28

## 2021-05-07 RX ADMIN — METHYLPREDNISOLONE SODIUM SUCCINATE 32 MG: 40 INJECTION, POWDER, FOR SOLUTION INTRAMUSCULAR; INTRAVENOUS at 23:36

## 2021-05-07 RX ADMIN — MIDAZOLAM 2 MG: 1 INJECTION INTRAMUSCULAR; INTRAVENOUS at 09:23

## 2021-05-07 RX ADMIN — MIDAZOLAM 1 MG: 1 INJECTION INTRAMUSCULAR; INTRAVENOUS at 09:25

## 2021-05-07 RX ADMIN — LORAZEPAM 0.5 MG: 2 INJECTION INTRAMUSCULAR; INTRAVENOUS at 18:37

## 2021-05-07 RX ADMIN — HYDROMORPHONE HYDROCHLORIDE 0.5 MG: 1 INJECTION, SOLUTION INTRAMUSCULAR; INTRAVENOUS; SUBCUTANEOUS at 06:49

## 2021-05-07 ASSESSMENT — ACTIVITIES OF DAILY LIVING (ADL)
ADLS_ACUITY_SCORE: 15

## 2021-05-07 NOTE — CONSULTS
[facility]  Colon and Rectal Surgery Consult Note  Name: Doretha Fernandez    MRN: [id number]  YOB: 1975    Age: 46 year old  Date of admission: 5/6/2021  Primary care provider: Anna Naidu              History of Present Illness:   Doretha Fernandez is a 46 year old female who presents with recurrent rectal bleeding. She has a history of melanoma with treatment with nivolumab ~5 years ago, which resulted in her developing colitis. 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 She also has issues with seronegative arthritis, and notes that she has been on steroids for approximately 4 years primarily to treat the arthritic symptoms. More recently, her colitis symptoms have worsened significantly. Her bloody bowel movements have become more pronounced and frequent, and she recently required admission. She was discharged on Monday on a prednisone taper, and at the time was having more solid nonbloody bowel movements. Over the week, and particularly last night, she started having abdominal cramps and passing blood stools with large clots. She presented to the ED via ambulance and was found to be hemodynamically stable with a hemoglobin of 14.0 but a lactate of 4.0. CT scan did not show any evidence of bowel ischemia or perforation.               Past Medical History:     Past Medical History:   Diagnosis Date     Abnormal MRI     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      Anxiety      Basal cell carcinoma      Cervical high risk HPV (human papillomavirus) test positive 12/13/2019    See problem list     Colitis      Depression      Diabetes mellitus, iatrogenic (H) 1/28/2020     Inflammatory arthritis      Insomnia      Intestinal giardiasis 3/5/2018     Lumbago     left lower back pain     Lymphedema      Malignant melanoma (H)      Melanoma (H) 10/23/2017     Mild  persistent asthma      Obesity      STORMY (obstructive sleep apnea)      Prothrombin deficiency (H)     takes 81mg asa daily     Stroke (cerebrum) (H)     During      TIA (transient ischemic attack)      Type 2 diabetes mellitus (H)              Past Surgical History:     Past Surgical History:   Procedure Laterality Date     APPENDECTOMY  2004     COLONOSCOPY N/A 10/18/2017    Procedure: COLONOSCOPY;  Colon;  Surgeon: Debbie Stephens MD;  Location: [facility] OR     COLONOSCOPY N/A 3/9/2018    Procedure: COMBINED COLONOSCOPY, SINGLE OR MULTIPLE BIOPSY/POLYPECTOMY BY BIOPSY;  colon;  Surgeon: Benita Schumacher MD;  Location: [facility] GI     COLONOSCOPY      multiple since  to present - about 6 total     DISSECT LYMPH NODE AXILLA Left 10/23/2017    Procedure: DISSECT LYMPH NODE AXILLA;  Left Axillary Lymph Node Dissection ;  Surgeon: Laurent Cool MD;  Location: [facility] OR     EXAM UNDER ANESTHESIA PELVIC N/A 2020    Procedure: EXAM UNDER ANESTHESIA, PELVIS; with Cervical Biopsies, Vaginal Biopsy and Endocervical Curettings;  Surgeon: Melina Jung MD;  Location: [facility] OR     GYN SURGERY  ,          REPAIR MOHS Left 2017    Procedure: REPAIR MOHS;  Left Upper Lid Moh's Reconstruction;  Surgeon: Kisha Bosch MD;  Location: [facility] OR               Social History:     Social History     Tobacco Use     Smoking status: Former Smoker     Packs/day: 1.00     Years: 5.00     Pack years: 5.00     Quit date: 3/20/1998     Years since quittin.1     Smokeless tobacco: Never Used   Substance Use Topics     Alcohol use: Yes     Comment: occ             Family History:     Family History   Problem Relation Age of Onset     Cancer Mother 45        lung     Neurologic Disorder Mother         epilepsy     Lipids Father      Gastrointestinal Disease Father         diverticulitis      Depression Father      Cancer Maternal Grandmother      Blood Disease Maternal Grandmother         lymphoma       "Arthritis Maternal Grandmother      Diabetes Maternal Grandmother      Depression Maternal Grandmother      Macular Degeneration Maternal Grandmother      Glaucoma Maternal Grandmother      Diabetes Maternal Grandfather      Cerebrovascular Disease Maternal Grandfather      Blood Disease Maternal Grandfather      Heart Disease Maternal Grandfather      Glaucoma Maternal Grandfather      Cancer Paternal Grandmother      Cancer - colorectal Paternal Grandmother      Respiratory Paternal Grandfather         emphysema      Heart Disease Daughter      Asthma Daughter      Depression Sister      Melanoma No family hx of              Allergies:     Allergies   Allergen Reactions     Bee Venom Swelling     Azithromycin Diarrhea     Erythromycin      Other reaction(s): GI intolerance, Vomiting     Fentanyl Other (See Comments)     sweating  sweating     Prochlorperazine Fatigue     Other reaction(s): Other (see comments)  Fatigue     Buspirone      Other reaction(s): GI intolerance  vomiting     Erythrocin Nausea and Vomiting     Zithromax [Azithromycin Dihydrate] Diarrhea     Enbrel [Etanercept] Hives and Rash             Medications:       heparin lock flush  5-15 mL Intracatheter Q24H     insulin aspart  1-4 Units Subcutaneous Q4H     pantoprazole (PROTONIX) IV  40 mg Intravenous Daily with breakfast     piperacillin-tazobactam  4.5 g Intravenous Q6H     sodium chloride (PF)  10 mL Intracatheter Q8H     sodium chloride (PF)  10 mL Intracatheter Q8H     sodium chloride (PF)  3 mL Intracatheter Q8H     vancomycin  125 mg Oral BID             Review of Systems:   A comprehensive greater than 10 system review of systems was carried out.  Pertinent positives and negatives are noted above.  Otherwise negative for contributory info.            Physical Exam:     Blood pressure 131/77, pulse 74, temperature 98.5  F (36.9  C), temperature source Oral, resp. rate 13, height 1.6 m (5' 3\"), weight 111.6 kg (246 lb), SpO2 95 %, not " currently breastfeeding.    Intake/Output Summary (Last 24 hours) at 5/6/2021 2145  Last data filed at 5/6/2021 1956  Gross per 24 hour   Intake --   Output 1000 ml   Net -1000 ml       EXAM:  GEN: Awake alert and oriented  PULM: Non-labored breathing with normal respiratory effort  CVS: reg rate and rhythm  ABD: Soft, tender in LLQ but no guarding or rebound, nondistended  NEURO: CN II-XII grossly intact  MSK: extremeties with no clubbing, cyanosis or edema  PSYCH: responsive, alert, cooperative; oriented x3; appropriate mood and affect  EXT/SKIN: inspection reveals no rashes, lesions or ulcers, normal coloring         Data Reviewed:     Recent Results (from the past 24 hour(s))   CT Abdomen Pelvis w Contrast    Narrative    CT CHEST PULMONARY EMBOLISM W CONTRAST, CT ABDOMEN PELVIS W CONTRAST  5/6/2021 4:51 PM    History: Chest pain, tachycardia, evaluate for pulmonary embolism,  inflammatory bowel disease    Comparison: Chest radiograph 4/13/2021, abdomen and pelvis CT  4/26/2021, 04/13/2021    Technique: Helical CT acquisition from the lung apices to the pubic  symphysis was obtained with intravenous contrast, per pulmonary  angiogram protocol. Images of the abdomen and pelvis were obtained  during portal venous phase. Axial, coronal, and sagittal  reconstructions were obtained and reviewed.    Contrast: iopamidol (ISOVUE-370) solution 135 mL    Findings:     CHEST:   Contrast bolus is adequate. No evidence for pulmonary embolism.    Lungs: No pneumothorax, pleural effusion, or suspicious pulmonary  nodule. Bibasilar atelectasis.  Airways: Central tracheobronchial tree is clear.  Vessels: Main pulmonary artery is borderline enlarged measuring up to  3.0 cm. Thoracic aorta is normal in caliber.  Heart: Heart size is normal without pericardial effusion.  Lymph nodes: No suspicious mediastinal or hilar lymphadenopathy.  Thyroid: Within normal limits.    ABDOMEN PELVIS:    Liver: Normal parenchymal attenuation  without focal mass.  Biliary system: Gallbladder is within normal limits. No intrahepatic  or extrahepatic biliary ductal dilatation.  Pancreas: Diffuse fatty atrophy of the pancreas.  Stomach: Within normal limits.  Spleen: Within normal limits.  Adrenal glands: Within normal limits.  Kidneys: No focal mass, hydronephrosis, or stone.  Bladder: Within normal limits.  Reproductive organs: Within normal limits.  Colon: Few scattered colonic diverticula. No evidence of  diverticulitis. Mild to moderate colonic stool.  Appendix: Within normal limits.  Small Bowel: Within normal limits.  Lymph nodes: No intra-abdominal or pelvic lymphadenopathy.  Vasculature: Within normal limits.    Soft tissues: No suspicious soft tissue mass or fluid collection.  Small fat-containing umbilical hernia.  Bones: No suspicious osseous lesion. Mild degenerative changes in the  spine.      Impression    IMPRESSION:   1. No pulmonary embolism.   2. No acute findings in the chest, abdomen, or pelvis.    I have personally reviewed the examination and initial interpretation  and I agree with the findings.    JOVI VEGA MD   CT Chest Pulmonary Embolism w Contrast    Narrative    CT CHEST PULMONARY EMBOLISM W CONTRAST, CT ABDOMEN PELVIS W CONTRAST  5/6/2021 4:51 PM    History: Chest pain, tachycardia, evaluate for pulmonary embolism,  inflammatory bowel disease    Comparison: Chest radiograph 4/13/2021, abdomen and pelvis CT  4/26/2021, 04/13/2021    Technique: Helical CT acquisition from the lung apices to the pubic  symphysis was obtained with intravenous contrast, per pulmonary  angiogram protocol. Images of the abdomen and pelvis were obtained  during portal venous phase. Axial, coronal, and sagittal  reconstructions were obtained and reviewed.    Contrast: iopamidol (ISOVUE-370) solution 135 mL    Findings:     CHEST:   Contrast bolus is adequate. No evidence for pulmonary embolism.    Lungs: No pneumothorax, pleural effusion, or  suspicious pulmonary  nodule. Bibasilar atelectasis.  Airways: Central tracheobronchial tree is clear.  Vessels: Main pulmonary artery is borderline enlarged measuring up to  3.0 cm. Thoracic aorta is normal in caliber.  Heart: Heart size is normal without pericardial effusion.  Lymph nodes: No suspicious mediastinal or hilar lymphadenopathy.  Thyroid: Within normal limits.    ABDOMEN PELVIS:    Liver: Normal parenchymal attenuation without focal mass.  Biliary system: Gallbladder is within normal limits. No intrahepatic  or extrahepatic biliary ductal dilatation.  Pancreas: Diffuse fatty atrophy of the pancreas.  Stomach: Within normal limits.  Spleen: Within normal limits.  Adrenal glands: Within normal limits.  Kidneys: No focal mass, hydronephrosis, or stone.  Bladder: Within normal limits.  Reproductive organs: Within normal limits.  Colon: Few scattered colonic diverticula. No evidence of  diverticulitis. Mild to moderate colonic stool.  Appendix: Within normal limits.  Small Bowel: Within normal limits.  Lymph nodes: No intra-abdominal or pelvic lymphadenopathy.  Vasculature: Within normal limits.    Soft tissues: No suspicious soft tissue mass or fluid collection.  Small fat-containing umbilical hernia.  Bones: No suspicious osseous lesion. Mild degenerative changes in the  spine.      Impression    IMPRESSION:   1. No pulmonary embolism.   2. No acute findings in the chest, abdomen, or pelvis.    I have personally reviewed the examination and initial interpretation  and I agree with the findings.    JOVI VEGA MD       Recent Labs   Lab 05/06/21  1356 05/03/21  0956 05/02/21  0836   WBC 28.3* 20.3* 20.7*   HGB 14.0 13.7 14.1   HCT 41.8 41.1 42.5   MCV 95 95 93    307 331          Lab Results   Component Value Date     05/06/2021     05/01/2021     04/30/2021    Lab Results   Component Value Date    CHLORIDE 105 05/06/2021    CHLORIDE 105 05/01/2021    CHLORIDE 105 04/30/2021     Lab Results   Component Value Date    BUN 19 05/06/2021    BUN 23 05/01/2021    BUN 22 04/30/2021      Lab Results   Component Value Date    POTASSIUM 4.2 05/06/2021    POTASSIUM 4.2 05/01/2021    POTASSIUM 3.8 04/30/2021    Lab Results   Component Value Date    CO2 24 05/06/2021    CO2 26 05/01/2021    CO2 26 04/30/2021    Lab Results   Component Value Date    CR 0.66 05/06/2021    CR 0.70 05/02/2021    CR 0.68 05/01/2021        Recent Labs   Lab 05/06/21  1839 05/06/21  1356   LACT 2.9* 4.0*         Assessment and Plan:   46F with history of nivolumab-induced colitis vs. Ulcerative colitis presenting with recurrence of blood per rectum. Currently, she is hemodynamically normal with improving lactate, and no radiographic evidence of perforation. We agree with endoscopic evaluation by gastroenterology as well as ruling out infectious causes, particularly given her history of Clostridium difficile infection. No acute surgical intervention at present. Will continue to follow.

## 2021-05-07 NOTE — PLAN OF CARE
"/67 (BP Location: Right leg)   Pulse 81   Temp 98.6  F (37  C) (Oral)   Resp 16   Ht 1.6 m (5' 3\")   Wt 112.2 kg (247 lb 4.8 oz)   LMP 04/30/2021 (Exact Date)   SpO2 93%   BMI 43.81 kg/m        [id number]  VSS on RA. Dilaudid x2 and Tylenol x1 for abdominal pain. BG checks q4, 98, 78 and 178. Insulin given per sliding scale. Regular diet, denies nausea. R DL midline with LR @100ml/hr, other lumen hep locked. R PIV TKO between abx. Up SBA. Commode at bedside. Team didn't want lactic recheck this shift. Pt had flex-sig done today. Tap water enema given x1. Social work spoke with pt today. Will continue to monitor and notify team with changes.   "

## 2021-05-07 NOTE — PROGRESS NOTES
Admitted/transferred from: Emergency department  Time of arrival on unit 0774  2 RN full  skin assessment completed by TEJINDER Dent and Mela RN  Skin assessment finding: Bruising on Abdomen and R forearm/hand; Edema of BUE; Blanchable redness on coccyx  Interventions/actions: No interventions initiated at this time.    Will continue to monitor.

## 2021-05-07 NOTE — OR NURSING
Pt had flex sig with biopsy under moderate sedation, pt tolerated procedure very well. Stool sample collected for the floor d/t difficulty obtaining sample. Sent to primary RN Ania for proper labeling and send to lab (if adequate amount present). Pt stable at time of transfer back to .

## 2021-05-07 NOTE — PLAN OF CARE
Just gave 200cc of tap water enema. Currently is up to the bedside commode waiting to expel her enema.    0744: Did pass the enema and flex of red/maroon tissue present, Just a few gail of actual stool noted.

## 2021-05-07 NOTE — CONFIDENTIAL NOTE
[facility] called and offered patient an appt for infusion on May 12 at 1330  Left this date and time message for patient.

## 2021-05-07 NOTE — PLAN OF CARE
"BP (!) 143/78 (BP Location: Right arm)   Pulse 70   Temp 97.9  F (36.6  C) (Oral)   Resp 16   Ht 1.6 m (5' 3\")   Wt 112.2 kg (247 lb 4.8 oz)   SpO2 97%   BMI 43.81 kg/m      Shift: 1500 - 2330 (PT admitted to  around 2215)  VS: Stable on RA, afebrile; Hypertensive  Neuro: AOx4  BG: Q4H  Labs: Lactic acid 4.0 recheck at 1839 was 2.9; Stool sample needs to be collected  Respiratory: Pt denies dyspnea, no cough noted  Pain/Nausea/PRN: Pt denies nausea; PRN dilaudid x1  Diet: NPO  LDA: R PIV (infusing NS @ 125 mL/hr); R Midline catheter (Red lumen heparin locked, purple lumen infusing LR @ 100 mL/hr)  GI/: Voids without difficulty; Stools are loose and bloody, one stool occurrence since admission to the unit  Skin: Bruising on abdomen and R forearm/hand; Edema in BUE; Blanchable redness on coccyx  Mobility: SBA  Plan: Sigmoid colonoscopy tomorrow (5/7/21)    Will give report to oncoming nurse. Pt left in stable condition, care relinquished at this time.     "

## 2021-05-07 NOTE — PLAN OF CARE
[id number] Nursing Shift  Report  At start of shift Doretha was c/o a severe headache and abdominal discomfort. For her headache she rec'd tylenol and zofran for abdominal discomfort I gave Zofran, When later checking in  noticed she was sleeping. Did speak with [employer] cover and rec'd an order to stop the NS and continue with the LR at 100cc/hr. When assisting to her to bathroom later in shift she was c/o increasing abd pain. Did give IV dilaudid at the time and when back to bed continued to c/o abd. Discomfort, but fell off to sleep. Doretha did stool a small amount, but was mixed with urine and could not send stool samples. Stool looked more like pieces of sluffed off maroon tissue. Is NPO for a flex-sig this morning and will give an enema around 0630, Which she is aware of. Will continue to monitor and report any significant changes,

## 2021-05-07 NOTE — PROGRESS NOTES
"[facility]    Medicine Progress Note - Hospitalist Service, Gold 11       Date of Admission:  5/6/2021  Assessment & Plan       Doretha Fernandez is a 46 year old female admitted on 5/6/2021. She has PMH of melanoma w/ nivolumab induced colitis s/p treatment with infliximab and vedolizumab, hx of giardia and C.diff infection (most recently 4/7/21), seronegative RA on prednisone and tocilizumab, Steroid induced hyperglycemia, CVA w/ mild left-sides weakness (2004), HLD, HTN, and Depression  w/ recent admission 4/26-5/3/21 with new UC diagnosis who presents to the ED for evaluation of recurrent abdominal pain and hematochezia. Patient admitted to Medicine w/ GI consult. Underwent flex sig which showed Gunter Score 3 UC and patient started on IV steroids.     Acute flare of ulcerative proctocolitis  Leukocytosis  Lactic Acidosis, resolved  Patient presents with~ 12 hour hx of LLQ abdominal pain, hematochezia, clot, and mucus. No fever, though reports feeling chilled.  Currently on prednisone taper (maintains on 40 mg of prednisone daily) GI consulted on admission and felt to be likely UC flare. CT AP w/o signs of abscess or perforation. She underwent flex sig day after admission and confirmed Gunter Score 3 UC and biopsies taken. Started on IV solumedrol with plans for possibly starting cellcept  - GI consult appreciate assistance and recommendations  - CRS surgery consult no indication for surgery  - Continue IVF   - IV solumedrol 32 mg BID  - Follow stool panel   - Stopped zosyn    Non cardiac chest pain  Patient reports that she has andreas experiencing \"chest fullness\" and a burning sensation in her epigastric region since before recent admission 4/26/21. No aggravating or alleviating factors PTA. VSS on admission. No O2 use. CTPE neg. Received GI cocktail in ED with mild improvement in sx. EKG normal and Trop negative.                Seronegative RA  Follows with Rheumatology at " [facility], DR. Brady. Previously  maintained on tocilizumab. Reports prednisone taper since 8/2020- was on 5 mg prednisone daily prior to recent UC diagnosis.   - F/u with Rheumatology as scheduled     Steroid induced hyperglycemia  A1c 6.1 (4/5/21). BG stable on admission. PTA metformin  - HOLD metformin  - LSSI q4h while NPO  - BG checks q4h      HTN  BP mildly elevated on admission on PTA Norvasc  - will hold until tolerating more intake or bp rises     HLD: PTA statin  - Did not order      Anxiety  Severe in setting of a/c medical, reports of challenges with teenage daughters PTA, and concern for job loss with recent sick leave/abscence. PTA Effexor  - PRN IV ativan  - DID not order tonight  - Continue to provide support  - May benefit from health Phycology consult      Hx of Malignant Melanoma  Dx 11/2017. Follows with Dr. Giron at [facility]. C/b check point inhibitor colitis s/p treatment with  infliximab and vedolizumab. Now in remission   - Monitor   - F/u with Oncology as schedduled     Hx of recurrent C. Diff infection  Positive PCR at outside facility 4/7- on treatment w/ PO Vancomycin taper w/ PCR now negative  - Continue BID PO Vancomycin dosing while on abx       Diet: Advance Diet as Tolerated: Clear Liquid Diet    DVT Prophylaxis: Enoxaparin (Lovenox) SQ  Major Catheter: not present  Code Status: Full Code           Disposition Plan   Expected discharge: 2 - 3 days, recommended to prior living arrangement once UC flare resolved and medication management decided upon.  Entered: Lamonte Ellison DO 05/07/2021, 2:46 PM       The patient's care was discussed with the Bedside Nurse, Patient and GI Consultant.    Lamonte Ellison DO  Hospitalist Service, [address]  Contact information available via [facility] Paging/Directory  Please see sign in/sign out for up to date coverage  information  ______________________________________________________________________    Interval History     Patient admitted yesterday for concerns of UC flare. Doing well today when I saw her. Sleepy after procedure and endorsing some mild abdominal pain but asking to advance her diet.     Four point ROS completed and otherwise negative     Data reviewed today: I reviewed all medications, new labs and imaging results over the last 24 hours. I personally reviewed the EKG tracing showing NSR and the abdominal CT image(s) showing no acute pathology.    Physical Exam   Vital Signs: Temp: 97.9  F (36.6  C) Temp src: Oral BP: 125/70 Pulse: 65   Resp: 16 SpO2: 93 % O2 Device: None (Room air) Oxygen Delivery: 2 LPM  Weight: 247 lbs 4.8 oz    Constitutional: Laying in bed sleepy.   Eyes: anicteric  Cardiovascular: Regular rate and rhythm without murmurs or gallops  Pulmonary/Chest: Clear to auscultation bilaterally, with no wheezes or retractions. No respiratory distress.  GI: Soft with good bowel sounds.  Mild TTP all over. Non-distended, with no guarding, no rebound, no peritoneal signs.   Back:  No bony or CVA tenderness   Musculoskeletal:  No edema or clubbing   Skin: Skin is warm and dry. No rash noted to exposed skin areas.   Neurological: Alert and oriented to person, place, and time. Nonfocal exam  Psychiatric:  apprpopriatve    Data   Recent Labs   Lab 05/07/21  0544 05/06/21  1358 05/06/21  1356 05/03/21  0956 05/02/21  0836 05/01/21  1302   WBC 13.4*  --  28.3* 20.3* 20.7* 20.0*   HGB 11.5*  --  14.0 13.7 14.1 12.9   MCV 94  --  95 95 93 94     --  308 307 331 305   INR  --   --  0.96  --   --   --      --  138  --   --  136   POTASSIUM 4.0  --  4.2  --   --  4.2   CHLORIDE 106  --  105  --   --  105   CO2 28  --  24  --   --  26   BUN 14  --  19  --   --  23   CR 0.63  --  0.66  --  0.70 0.68   ANIONGAP 6  --  9  --   --  6   PATRICIA 7.9*  --  8.3*  --   --  8.9   GLC 93  --  149*  --   --  319*    ALBUMIN 2.8*  --  3.4  --   --  3.3*   PROTTOTAL 5.4*  --  6.7*  --   --  6.4*   BILITOTAL 0.5  --  0.2  --   --  0.2   ALKPHOS 42  --  52  --   --  53   ALT 63*  --  91*  --   --  140*   AST 10  --  21  --   --  36   LIPASE  --  47*  --   --   --   --    TROPI  --   --  <0.015  --   --   --      Recent Results (from the past 24 hour(s))   CT Abdomen Pelvis w Contrast    Narrative    CT CHEST PULMONARY EMBOLISM W CONTRAST, CT ABDOMEN PELVIS W CONTRAST  5/6/2021 4:51 PM    History: Chest pain, tachycardia, evaluate for pulmonary embolism,  inflammatory bowel disease    Comparison: Chest radiograph 4/13/2021, abdomen and pelvis CT  4/26/2021, 04/13/2021    Technique: Helical CT acquisition from the lung apices to the pubic  symphysis was obtained with intravenous contrast, per pulmonary  angiogram protocol. Images of the abdomen and pelvis were obtained  during portal venous phase. Axial, coronal, and sagittal  reconstructions were obtained and reviewed.    Contrast: iopamidol (ISOVUE-370) solution 135 mL    Findings:     CHEST:   Contrast bolus is adequate. No evidence for pulmonary embolism.    Lungs: No pneumothorax, pleural effusion, or suspicious pulmonary  nodule. Bibasilar atelectasis.  Airways: Central tracheobronchial tree is clear.  Vessels: Main pulmonary artery is borderline enlarged measuring up to  3.0 cm. Thoracic aorta is normal in caliber.  Heart: Heart size is normal without pericardial effusion.  Lymph nodes: No suspicious mediastinal or hilar lymphadenopathy.  Thyroid: Within normal limits.    ABDOMEN PELVIS:    Liver: Normal parenchymal attenuation without focal mass.  Biliary system: Gallbladder is within normal limits. No intrahepatic  or extrahepatic biliary ductal dilatation.  Pancreas: Diffuse fatty atrophy of the pancreas.  Stomach: Within normal limits.  Spleen: Within normal limits.  Adrenal glands: Within normal limits.  Kidneys: No focal mass, hydronephrosis, or stone.  Bladder: Within  normal limits.  Reproductive organs: Within normal limits.  Colon: Few scattered colonic diverticula. No evidence of  diverticulitis. Mild to moderate colonic stool.  Appendix: Within normal limits.  Small Bowel: Within normal limits.  Lymph nodes: No intra-abdominal or pelvic lymphadenopathy.  Vasculature: Within normal limits.    Soft tissues: No suspicious soft tissue mass or fluid collection.  Small fat-containing umbilical hernia.  Bones: No suspicious osseous lesion. Mild degenerative changes in the  spine.      Impression    IMPRESSION:   1. No pulmonary embolism.   2. No acute findings in the chest, abdomen, or pelvis.    I have personally reviewed the examination and initial interpretation  and I agree with the findings.    JOVI VEGA MD   CT Chest Pulmonary Embolism w Contrast    Narrative    CT CHEST PULMONARY EMBOLISM W CONTRAST, CT ABDOMEN PELVIS W CONTRAST  5/6/2021 4:51 PM    History: Chest pain, tachycardia, evaluate for pulmonary embolism,  inflammatory bowel disease    Comparison: Chest radiograph 4/13/2021, abdomen and pelvis CT  4/26/2021, 04/13/2021    Technique: Helical CT acquisition from the lung apices to the pubic  symphysis was obtained with intravenous contrast, per pulmonary  angiogram protocol. Images of the abdomen and pelvis were obtained  during portal venous phase. Axial, coronal, and sagittal  reconstructions were obtained and reviewed.    Contrast: iopamidol (ISOVUE-370) solution 135 mL    Findings:     CHEST:   Contrast bolus is adequate. No evidence for pulmonary embolism.    Lungs: No pneumothorax, pleural effusion, or suspicious pulmonary  nodule. Bibasilar atelectasis.  Airways: Central tracheobronchial tree is clear.  Vessels: Main pulmonary artery is borderline enlarged measuring up to  3.0 cm. Thoracic aorta is normal in caliber.  Heart: Heart size is normal without pericardial effusion.  Lymph nodes: No suspicious mediastinal or hilar lymphadenopathy.  Thyroid: Within  normal limits.    ABDOMEN PELVIS:    Liver: Normal parenchymal attenuation without focal mass.  Biliary system: Gallbladder is within normal limits. No intrahepatic  or extrahepatic biliary ductal dilatation.  Pancreas: Diffuse fatty atrophy of the pancreas.  Stomach: Within normal limits.  Spleen: Within normal limits.  Adrenal glands: Within normal limits.  Kidneys: No focal mass, hydronephrosis, or stone.  Bladder: Within normal limits.  Reproductive organs: Within normal limits.  Colon: Few scattered colonic diverticula. No evidence of  diverticulitis. Mild to moderate colonic stool.  Appendix: Within normal limits.  Small Bowel: Within normal limits.  Lymph nodes: No intra-abdominal or pelvic lymphadenopathy.  Vasculature: Within normal limits.    Soft tissues: No suspicious soft tissue mass or fluid collection.  Small fat-containing umbilical hernia.  Bones: No suspicious osseous lesion. Mild degenerative changes in the  spine.      Impression    IMPRESSION:   1. No pulmonary embolism.   2. No acute findings in the chest, abdomen, or pelvis.    I have personally reviewed the examination and initial interpretation  and I agree with the findings.    JOVI VEGA MD     Medications     lactated ringers 100 mL/hr at 05/07/21 1117       enoxaparin ANTICOAGULANT  40 mg Subcutaneous Q24H     gabapentin  900 mg Oral TID     heparin lock flush  5-15 mL Intracatheter Q24H     insulin aspart  1-4 Units Subcutaneous Q4H     methylPREDNISolone  32 mg Intravenous Q12H     pantoprazole (PROTONIX) IV  40 mg Intravenous Daily with breakfast     sodium chloride (PF)  10 mL Intracatheter Q8H     sodium chloride (PF)  10 mL Intracatheter Q8H     sodium chloride (PF)  3 mL Intracatheter Q8H     vancomycin  125 mg Oral BID

## 2021-05-07 NOTE — PROGRESS NOTES
CLINICAL NUTRITION SERVICES - ASSESSMENT NOTE     Nutrition Prescription    RECOMMENDATIONS FOR MDs/PROVIDERS TO ORDER:  Advance diet as able to low fiber   Encourage oral intake- if unable to advance diet with out exacerbation of diarrhea, recommend considering nutrition support  Consider checking fecal elastase due to low lipase   Consider starting general MVI in addition to calcium + D supplement     Malnutrition Status:    Non-severe Malnutrition in the context of acute on chronic illness     Recommendations already ordered by Registered Dietitian (RD):  Ordered Gelatein for patient today- pt may order supplement PRN     Future/Additional Recommendations:  Enteral Nutrition Recommendations: Vital 1.5 Claudio @ goal of  50ml/hr  (1200ml/day)  will provide: 1800 kcals, 81 g PRO, 916 ml free H20, 224 g CHO, and 7 g fiber daily.  --Start at 10 mL/hr and advance by 10 ml q 6-8 hours  --Check K+, Mg++, Po4 and do not advance if K+ <3, Mg<1.5 and Po4 <2. Hold at current rate and replace lytes. Once within parameters, may continue advancement.   --Free water flush of 60 mL q 4 hours for tube patency = 360 mL free water (flush + free water TF = 1276 mL/day)  --Start Certavite (15 mL) MVI daily   --Aspiration precautions with gastric feeds    If total bowel rest is indicated, recommend starting parenteral nutrition   Volume 1440 mL/day (60 mL/hr), Dex 125 g (GIR = 1.29 mg/kg/min), AA 90 g (1.3 g/kg), 250 ml 20% intralipid 6x per week = 1213 kcal (18 kcal/kg) and 35% kcal from fat   --Advance dextrose by 50 g/day pending labs and RD/PharmD discretion to goal of 225 g   TPN @ goal volume provides 1440 mL/day, 225 g (GIR= 2.32 mg/kg/min), 90 g AA and 250 ml 20% lipid 6x per week = 1553 kcal (23 kcal/kg- hypocaloric with TPN) and 27% kcal from fat  --Consider SMOF lipid if will be on TPN >14 days or Direct bili >2  -Monitor liver enzymes and Triglycerides at start of TPN and weekly      REASON FOR ASSESSMENT  Doretha Fernandez is  "a/an 46 year old female assessed by the dietitian for Admission Nutrition Risk Screen for positive (weight loss 2-13 lbs and poor appetite)     CLINICAL HISTORY   PMH of melanoma w/ nivolumab induced colitis s/p treatment with infliximab and vedolizumab, hx of giardia and C.diff infection (most recently 4/7/21), seronegative RA on prednisone and tocilizumab, Steroid induced hyperglycemia, CVA w/ mild left-sides weakness (2004), HLD, HTN, and Depression  w/ recent admission 4/26-5/3/21 with new UC diagnosis who presents to the ED for evaluation of recurrent abdominal pain and hematochezia.    NUTRITION HISTORY  Patient reported she has not been eating well for the past month as whatever she eats \"runs through her.\" Foods she has tried consuming include chicken, white bread, bananas, berries, pineapple and lactose free products as she heard lactose can impact diarrhea. She reported she may have lost 10 lbs then stated she has \"more to lose.\" She reported last flare was about 5 years ago.     CURRENT NUTRITION ORDERS  Diet: clears (just advanced)  Intake/Tolerance: N/A     LABS  Lipase 47 (L, 5/6)    MEDICATIONS  Novolog  Protonix    ANTHROPOMETRICS  Height: 160 cm (5' 3\")  Most Recent Weight: 112.2 kg (247 lb 4.8 oz)    IBW: 52.3 kg  BMI: >40   Weight History:   Wt Readings from Last 15 Encounters:   05/06/21 112.2 kg (247 lb 4.8 oz)   04/27/21 112.6 kg (248 lb 3.2 oz)   04/13/21 115.7 kg (255 lb)   04/05/21 115.7 kg (255 lb)   02/18/21 117 kg (258 lb)   09/23/20 112.4 kg (247 lb 12.8 oz)   09/06/20 114.8 kg (253 lb)   08/27/20 114.9 kg (253 lb 6.4 oz)   08/07/20 113.4 kg (250 lb)   07/16/20 111.1 kg (245 lb)   07/09/20 111.5 kg (245 lb 12.8 oz)   07/01/20 111.6 kg (246 lb)   06/26/20 111.6 kg (246 lb)   06/04/20 112.5 kg (248 lb)   05/27/20 111.1 kg (245 lb)   3% weight change in one month     Dosing Weight: 67.2 kg (adjusted)    ASSESSED NUTRITION NEEDS  RMR (MSJ) 1536 per weight 112.2 kg   Estimated Energy Needs: " 1344 - 1680 - 2016  kcals/day (20 - 25 - 30 kcals/kg)  Justification: Maintenance, lower end if TPN is needed   Estimated Protein Needs: + grams protein/day (1.2 - 1.5 grams of pro/kg)  Justification: Increased needs  Estimated Fluid Needs: 8237-5871 mL/day (1 mL/kcal)   Justification: Maintenance    PHYSICAL FINDINGS  See malnutrition section below.    MALNUTRITION  % Intake: < 75% for > 7 days (non-severe)  % Weight Loss: Up to 5% in 1 month (non-severe)  Subcutaneous Fat Loss: None observed  Muscle Loss: None observed  Fluid Accumulation/Edema: None noted  Malnutrition Diagnosis: Non-severe malnutrition in the context of acute on chronic illness     NUTRITION DIAGNOSIS  Inadequate oral intake related to altered GI function as evidenced by inability to consume adequate PO intake 2/2 colitis flare and current clear liquid diet     INTERVENTIONS  Implementation  Nutrition Education: RD role in care  - Provided handout per NCM: Medical Nutrition Therapy for Crohn's Colitis.    Medical food supplement therapy     Goals  Diet adv v nutrition support within 2-3 days.     Monitoring/Evaluation  Progress toward goals will be monitored and evaluated per protocol.    Nahed Merino RD, LD  6B pager: [phone]

## 2021-05-07 NOTE — CONSULTS
"Care Management Follow Up    Length of Stay (days): 1    Expected Discharge Date: 05/10/21     Concerns to be Addressed: psychosocial concerns      Patient plan of care discussed at interdisciplinary rounds: Yes    Anticipated Discharge Disposition: Unknown      Anticipated Discharge Services:  N/A  Anticipated Discharge DME:  N/A    Patient/family educated on Medicare website which has current facility and service quality ratings:  N/A  Education Provided on the Discharge Plan: N/A   Patient/Family in Agreement with the Plan:  N/A    Referrals Placed by CM/SW: N/A    Private pay costs discussed: Not applicable    Additional Information:  Pt is a 46 year joao woman presenting to [facility] on 5/7 for evaluation of recurrent abdominal pain and hematochezia. Pt has history of multiple hospitalizations with the last being noted 4/26-5/3. SW received consult for psychosocial support. SW met w/ pt at bedside to discuss concerns and provide support and resources as needed.     Pt identified feeling \"tired\" and relayed wanting to \"get healthy and go home\". SW provided emotional support and inquired about any social supports. Pt identified her father and family friend Adolfo as support. Adolfo has been able to visit pt and she noted being tired during visits. Pt mentioned having 2 teenage children \"17\" and \"13\". Pt reported speaking with her youngest daily. Pt mentioned worrying about her youngest daughter the most as her oldest is \"mean\". Pt noted her 17 year old daughter has been using drugs, partying and engaging in harmful behavior. Pt reported wanting to be home and care for her family. Pt appeared teary eyed throughout meeting as she discussed her illness. SW normalized her experience and provided empathy. SW provided education about healthy psychology team and pt expressed interest in working with health psychology. SW agreed to notify the team to provide ongoing support for pt. Pt thanked MIGUEL for meeting, but asked for time " "to rest as she became fatigued.    Lastly, pt identified concerns about losing her job as her \"FMLA is up\". SW provided emotional support and agreed to speak w/ unit SW to provide additional resources.    SW available for ongoing support and resources as needed throughout hospitalization.    GEORGIE Nash, LGSW- assisting 7A  5A Acute Care   PH: [phone]  Pager: [phone]  [facility]          "

## 2021-05-07 NOTE — CONFIDENTIAL NOTE
Approval for julianne  [facility]   Also sent my chart message

## 2021-05-07 NOTE — PROGRESS NOTES
Colorectal Surgery Progress Note  [facility]      Subjective:  Feeling a little bit better today.  Has not had any stools/flatus since admission.      Vitals:  Vitals:    05/06/21 1938 05/06/21 2225 05/07/21 0100 05/07/21 0400   BP:  (!) 143/78 (!) 141/81 134/74   BP Location:  Right arm     Pulse: 74 70 71 70   Resp: 13 16 16 16   Temp:  97.9  F (36.6  C) 98  F (36.7  C) 97.8  F (36.6  C)   TempSrc:  Oral Oral Oral   SpO2: 95% 97% 97% 97%   Weight:  112.2 kg (247 lb 4.8 oz)     Height:         I/O:  I/O last 3 completed shifts:  In: 20 [I.V.:20]  Out: 1000 [Urine:1000]    Physical Exam:  Gen: AAOx3, NAD  Pulm: Non-labored breathing  Abd: Soft, protuberant, mildly tender, no guarding/rebound  Ext:  Warm and well-perfused    BMP  Recent Labs   Lab 05/07/21  0544 05/06/21  1356 05/02/21  0836 05/01/21  1302 04/30/21  1705    138  --  136 137   POTASSIUM 4.0 4.2  --  4.2 3.8   CHLORIDE 106 105  --  105 105   CO2 28 24  --  26 26   BUN 14 19  --  23 22   CR 0.63 0.66 0.70 0.68 0.66   GLC 93 149*  --  319* 196*   MAG  --  2.6*  --   --   --    PHOS  --  2.7  --   --   --      CBC  Recent Labs   Lab 05/07/21  0544 05/06/21  1356 05/03/21  0956 05/02/21  0836   WBC 13.4* 28.3* 20.3* 20.7*   HGB 11.5* 14.0 13.7 14.1   HCT 35.5 41.8 41.1 42.5    308 307 331         ASSESSMENT: This is a 46 year old female PMH melanoma Tx w/ nivolumab ~5 yrs ago c/b nivolumab-induced colitis, more recently thought to be underlying UC, seroneg RA on steroids x4 years. Recently had worsening of colitis with admission discharged on 5/3 w/ steroid taper. However has had progressively worsening abd pain, hematochezia w/ large clots. CT neg for bowel ischemia/perf. Hgb 11.5 from baseline around 12.  WBC down to 13.4 today.  Likely hemoconcentrated on admission.     - no indication for acute surgical intervention  - apprec Medicine and GI work up.  Restarted on IV solumedrol.  Tentative plan for flex sig  today with GI.   - we will continue to follow      Judi Sanabria PA-C   Colon and Rectal Surgery  [id number]     Patient was seen and discussed with Fellow, Dr. Kauffman

## 2021-05-08 LAB
ANION GAP SERPL CALCULATED.3IONS-SCNC: 8 MMOL/L (ref 3–14)
BUN SERPL-MCNC: 16 MG/DL (ref 7–30)
CALCIUM SERPL-MCNC: 9.2 MG/DL (ref 8.5–10.1)
CHLORIDE SERPL-SCNC: 106 MMOL/L (ref 94–109)
CO2 SERPL-SCNC: 22 MMOL/L (ref 20–32)
CREAT SERPL-MCNC: 0.71 MG/DL (ref 0.52–1.04)
CRP SERPL-MCNC: 11 MG/L (ref 0–8)
ERYTHROCYTE [DISTWIDTH] IN BLOOD BY AUTOMATED COUNT: 12.8 % (ref 10–15)
ERYTHROCYTE [DISTWIDTH] IN BLOOD BY AUTOMATED COUNT: NORMAL % (ref 10–15)
GFR SERPL CREATININE-BSD FRML MDRD: >90 ML/MIN/{1.73_M2}
GLUCOSE BLDC GLUCOMTR-MCNC: 121 MG/DL (ref 70–99)
GLUCOSE BLDC GLUCOMTR-MCNC: 154 MG/DL (ref 70–99)
GLUCOSE BLDC GLUCOMTR-MCNC: 162 MG/DL (ref 70–99)
GLUCOSE BLDC GLUCOMTR-MCNC: 216 MG/DL (ref 70–99)
GLUCOSE BLDC GLUCOMTR-MCNC: 261 MG/DL (ref 70–99)
GLUCOSE BLDC GLUCOMTR-MCNC: 266 MG/DL (ref 70–99)
GLUCOSE SERPL-MCNC: 196 MG/DL (ref 70–99)
HCT VFR BLD AUTO: 38.6 % (ref 35–47)
HCT VFR BLD AUTO: NORMAL % (ref 35–47)
HGB BLD-MCNC: 12.7 G/DL (ref 11.7–15.7)
HGB BLD-MCNC: NORMAL G/DL (ref 11.7–15.7)
MCH RBC QN AUTO: 30.6 PG (ref 26.5–33)
MCH RBC QN AUTO: NORMAL PG (ref 26.5–33)
MCHC RBC AUTO-ENTMCNC: 32.9 G/DL (ref 31.5–36.5)
MCHC RBC AUTO-ENTMCNC: NORMAL G/DL (ref 31.5–36.5)
MCV RBC AUTO: 93 FL (ref 78–100)
MCV RBC AUTO: NORMAL FL (ref 78–100)
PLATELET # BLD AUTO: 320 10E9/L (ref 150–450)
PLATELET # BLD AUTO: NORMAL 10E9/L (ref 150–450)
POTASSIUM SERPL-SCNC: 4.1 MMOL/L (ref 3.4–5.3)
RBC # BLD AUTO: 4.15 10E12/L (ref 3.8–5.2)
RBC # BLD AUTO: NORMAL 10E12/L (ref 3.8–5.2)
SODIUM SERPL-SCNC: 136 MMOL/L (ref 133–144)
WBC # BLD AUTO: 17.8 10E9/L (ref 4–11)
WBC # BLD AUTO: NORMAL 10E9/L (ref 4–11)

## 2021-05-08 PROCEDURE — 80048 BASIC METABOLIC PNL TOTAL CA: CPT | Performed by: STUDENT IN AN ORGANIZED HEALTH CARE EDUCATION/TRAINING PROGRAM

## 2021-05-08 PROCEDURE — 86140 C-REACTIVE PROTEIN: CPT | Performed by: STUDENT IN AN ORGANIZED HEALTH CARE EDUCATION/TRAINING PROGRAM

## 2021-05-08 PROCEDURE — 120N000011 HC R&B TRANSPLANT UMMC

## 2021-05-08 PROCEDURE — 250N000013 HC RX MED GY IP 250 OP 250 PS 637: Performed by: PHYSICIAN ASSISTANT

## 2021-05-08 PROCEDURE — 36415 COLL VENOUS BLD VENIPUNCTURE: CPT | Performed by: STUDENT IN AN ORGANIZED HEALTH CARE EDUCATION/TRAINING PROGRAM

## 2021-05-08 PROCEDURE — 85027 COMPLETE CBC AUTOMATED: CPT | Performed by: INTERNAL MEDICINE

## 2021-05-08 PROCEDURE — 250N000011 HC RX IP 250 OP 636: Performed by: STUDENT IN AN ORGANIZED HEALTH CARE EDUCATION/TRAINING PROGRAM

## 2021-05-08 PROCEDURE — 99232 SBSQ HOSP IP/OBS MODERATE 35: CPT | Performed by: INTERNAL MEDICINE

## 2021-05-08 PROCEDURE — 36415 COLL VENOUS BLD VENIPUNCTURE: CPT | Performed by: INTERNAL MEDICINE

## 2021-05-08 PROCEDURE — 258N000003 HC RX IP 258 OP 636: Performed by: PHYSICIAN ASSISTANT

## 2021-05-08 PROCEDURE — 250N000013 HC RX MED GY IP 250 OP 250 PS 637: Performed by: STUDENT IN AN ORGANIZED HEALTH CARE EDUCATION/TRAINING PROGRAM

## 2021-05-08 PROCEDURE — 250N000011 HC RX IP 250 OP 636: Performed by: PHYSICIAN ASSISTANT

## 2021-05-08 PROCEDURE — 999N001017 HC STATISTIC GLUCOSE BY METER IP

## 2021-05-08 PROCEDURE — 99232 SBSQ HOSP IP/OBS MODERATE 35: CPT | Performed by: STUDENT IN AN ORGANIZED HEALTH CARE EDUCATION/TRAINING PROGRAM

## 2021-05-08 RX ORDER — METHOCARBAMOL 500 MG/1
500 TABLET, FILM COATED ORAL 4 TIMES DAILY
Status: DISCONTINUED | OUTPATIENT
Start: 2021-05-08 | End: 2021-05-11 | Stop reason: HOSPADM

## 2021-05-08 RX ORDER — HYDROXYZINE HYDROCHLORIDE 25 MG/1
25 TABLET, FILM COATED ORAL EVERY 6 HOURS PRN
Status: DISCONTINUED | OUTPATIENT
Start: 2021-05-08 | End: 2021-05-10

## 2021-05-08 RX ADMIN — METHYLPREDNISOLONE SODIUM SUCCINATE 32 MG: 40 INJECTION, POWDER, FOR SOLUTION INTRAMUSCULAR; INTRAVENOUS at 23:14

## 2021-05-08 RX ADMIN — VENLAFAXINE HYDROCHLORIDE 225 MG: 75 CAPSULE, EXTENDED RELEASE ORAL at 07:35

## 2021-05-08 RX ADMIN — DICYCLOMINE HYDROCHLORIDE 20 MG: 20 TABLET ORAL at 17:31

## 2021-05-08 RX ADMIN — Medication 1 MG: at 02:27

## 2021-05-08 RX ADMIN — ALTEPLASE 2 MG: 2.2 INJECTION, POWDER, LYOPHILIZED, FOR SOLUTION INTRAVENOUS at 14:53

## 2021-05-08 RX ADMIN — INSULIN ASPART 1 UNITS: 100 INJECTION, SOLUTION INTRAVENOUS; SUBCUTANEOUS at 16:38

## 2021-05-08 RX ADMIN — HYDROMORPHONE HYDROCHLORIDE 0.5 MG: 1 INJECTION, SOLUTION INTRAMUSCULAR; INTRAVENOUS; SUBCUTANEOUS at 23:14

## 2021-05-08 RX ADMIN — GABAPENTIN 900 MG: 300 CAPSULE ORAL at 20:06

## 2021-05-08 RX ADMIN — Medication 1 MG: at 23:14

## 2021-05-08 RX ADMIN — HYDROXYZINE HYDROCHLORIDE 25 MG: 25 TABLET, FILM COATED ORAL at 14:15

## 2021-05-08 RX ADMIN — GABAPENTIN 900 MG: 300 CAPSULE ORAL at 14:15

## 2021-05-08 RX ADMIN — ALTEPLASE 2 MG: 2.2 INJECTION, POWDER, LYOPHILIZED, FOR SOLUTION INTRAVENOUS at 14:52

## 2021-05-08 RX ADMIN — METHOCARBAMOL 500 MG: 500 TABLET, FILM COATED ORAL at 15:48

## 2021-05-08 RX ADMIN — PANTOPRAZOLE SODIUM 40 MG: 40 TABLET, DELAYED RELEASE ORAL at 07:28

## 2021-05-08 RX ADMIN — ENOXAPARIN SODIUM 40 MG: 100 INJECTION SUBCUTANEOUS at 14:26

## 2021-05-08 RX ADMIN — DICYCLOMINE HYDROCHLORIDE 20 MG: 20 TABLET ORAL at 11:40

## 2021-05-08 RX ADMIN — METHYLPREDNISOLONE SODIUM SUCCINATE 32 MG: 40 INJECTION, POWDER, FOR SOLUTION INTRAMUSCULAR; INTRAVENOUS at 11:41

## 2021-05-08 RX ADMIN — INSULIN ASPART 2 UNITS: 100 INJECTION, SOLUTION INTRAVENOUS; SUBCUTANEOUS at 20:06

## 2021-05-08 RX ADMIN — ACETAMINOPHEN 650 MG: 325 TABLET, FILM COATED ORAL at 22:25

## 2021-05-08 RX ADMIN — METHOCARBAMOL 500 MG: 500 TABLET, FILM COATED ORAL at 20:06

## 2021-05-08 RX ADMIN — HYDROMORPHONE HYDROCHLORIDE 0.5 MG: 1 INJECTION, SOLUTION INTRAMUSCULAR; INTRAVENOUS; SUBCUTANEOUS at 14:24

## 2021-05-08 RX ADMIN — INSULIN ASPART 1 UNITS: 100 INJECTION, SOLUTION INTRAVENOUS; SUBCUTANEOUS at 23:21

## 2021-05-08 RX ADMIN — VANCOMYCIN HYDROCHLORIDE 125 MG: 125 CAPSULE ORAL at 07:27

## 2021-05-08 RX ADMIN — SODIUM CHLORIDE, POTASSIUM CHLORIDE, SODIUM LACTATE AND CALCIUM CHLORIDE: 600; 310; 30; 20 INJECTION, SOLUTION INTRAVENOUS at 18:03

## 2021-05-08 RX ADMIN — SODIUM CHLORIDE, POTASSIUM CHLORIDE, SODIUM LACTATE AND CALCIUM CHLORIDE: 600; 310; 30; 20 INJECTION, SOLUTION INTRAVENOUS at 07:35

## 2021-05-08 RX ADMIN — ENOXAPARIN SODIUM 40 MG: 100 INJECTION SUBCUTANEOUS at 23:38

## 2021-05-08 RX ADMIN — INSULIN ASPART 1 UNITS: 100 INJECTION, SOLUTION INTRAVENOUS; SUBCUTANEOUS at 04:50

## 2021-05-08 RX ADMIN — VANCOMYCIN HYDROCHLORIDE 125 MG: 125 CAPSULE ORAL at 20:06

## 2021-05-08 RX ADMIN — ACETAMINOPHEN 650 MG: 325 TABLET, FILM COATED ORAL at 10:44

## 2021-05-08 RX ADMIN — ACETAMINOPHEN 650 MG: 325 TABLET, FILM COATED ORAL at 02:27

## 2021-05-08 RX ADMIN — GABAPENTIN 900 MG: 300 CAPSULE ORAL at 07:27

## 2021-05-08 RX ADMIN — INSULIN ASPART 2 UNITS: 100 INJECTION, SOLUTION INTRAVENOUS; SUBCUTANEOUS at 11:47

## 2021-05-08 RX ADMIN — DICYCLOMINE HYDROCHLORIDE 20 MG: 20 TABLET ORAL at 07:27

## 2021-05-08 ASSESSMENT — ACTIVITIES OF DAILY LIVING (ADL)
ADLS_ACUITY_SCORE: 15
ADLS_ACUITY_SCORE: 15
ADLS_ACUITY_SCORE: 16
ADLS_ACUITY_SCORE: 15
ADLS_ACUITY_SCORE: 15
ADLS_ACUITY_SCORE: 16

## 2021-05-08 NOTE — PLAN OF CARE
9310  0730    47 y/o F w/hx of melanoma w/nivolumab induced colitis (s/p treatment w/infliximab & vedolizumab); giardia; c.diff; seronegative RA; steroid induced hyperglycemia; CVA w/mild left-sided weakness (2004); hyperlipidemia, hypertension, depression. Recent admission 4/26 - 5/3/21 (newly diagnosed w/UC). Admitted from ED 5/6/21 w/recurrent abdominal pain, hematochezia.     Vitals: Temp: 98.7  F (37.1  C) Temp src: Oral BP: (!) 152/90 Pulse: 78   Resp: 16 SpO2: 91 % O2 Device: None (Room air)   Pain/Nausea: Denies nausea; endorsed abdominal pain.   Interventions: 0.5mg IV dilaudid x1, 650mg tylenol x1.   Diet: Regular.   BG orders: Q4hr checks w/s/s novolog.   LDA: RPIV, saline locked; R double lumen midline w/LR @100mL/hr.   GI: Diarrhea this admission, but no BM this shift.   : Voiding, not saving.   Skin: Pale, molly.    Neuro: A&Ox4. Lethargic.   Mobility: Independent in room.   Plan: Continue to monitor.     Of note this shift: Pt has non-pitting edema of RUE.

## 2021-05-08 NOTE — PROGRESS NOTES
"[facility]    Medicine Progress Note - Hospitalist Service, Gold 11       Date of Admission:  5/6/2021  Assessment & Plan Doretha Fernandez is a 46 year old female admitted on 5/6/2021. She has PMH of melanoma w/ nivolumab induced colitis s/p treatment with infliximab and vedolizumab, hx of giardia and C.diff infection (most recently 4/7/21), seronegative RA on prednisone and tocilizumab, Steroid induced hyperglycemia, CVA w/ mild left-sides weakness (2004), HLD, HTN, and Depression  w/ recent admission 4/26-5/3/21 with new UC diagnosis who presents to the ED for evaluation of recurrent abdominal pain and hematochezia. Patient admitted to Medicine w/ GI consult. Underwent flex sig which showed Gunter Score 3 UC and patient started on IV steroids.     Acute flare of ulcerative proctocolitis  Leukocytosis  Lactic Acidosis, resolved  Patient presents with~ 12 hour hx of LLQ abdominal pain, hematochezia, clot, and mucus. No fever, though reports feeling chilled.  Currently on prednisone taper (maintains on 40 mg of prednisone daily) GI consulted on admission and felt to be likely UC flare. CT AP w/o signs of abscess or perforation. She underwent flex sig day after admission and confirmed Gunter Score 3 UC and biopsies taken. Started on IV solumedrol with plans for possibly starting cellcept  - GI consult appreciate assistance and recommendations  - CRS surgery consult no indication for surgery  - Continue IVF   - IV solumedrol 32 mg BID  - Follow stool panel   - Stopped zosyn    Non cardiac chest pain  Patient reports that she has andreas experiencing \"chest fullness\" and a burning sensation in her epigastric region since before recent admission 4/26/21. No aggravating or alleviating factors PTA. VSS on admission. No O2 use. CTPE neg. Received GI cocktail in ED with mild improvement in sx. EKG normal and Trop negative.                Seronegative RA  Follows with [facility], " DR. Brady. Previously  maintained on tocilizumab. Reports prednisone taper since 8/2020- was on 5 mg prednisone daily prior to recent UC diagnosis.   - F/u with Rheumatology as scheduled     Steroid induced hyperglycemia  A1c 6.1 (4/5/21). BG stable on admission. PTA metformin  - HOLD metformin  - LSSI q4h while NPO  - BG checks q4h      HTN  BP mildly elevated on admission on PTA Norvasc  - will hold until tolerating more intake or bp rises     HLD: PTA statin  - Did not order      Anxiety  Severe in setting of a/c medical, reports of challenges with teenage daughters PTA, and concern for job loss with recent sick leave/abscence. PTA Effexor  - PRN IV ativan  - DID not order tonight  - Continue to provide support  - May benefit from health Phycology consult      Hx of Malignant Melanoma  Dx 11/2017. Follows with Dr. Giron at [place]. C/b check point inhibitor colitis s/p treatment with  infliximab and vedolizumab. Now in remission   - Monitor   - F/u with Oncology as schedduled     Hx of recurrent C. Diff infection  Positive PCR at outside facility 4/7- on treatment w/ PO Vancomycin taper w/ PCR now negative  - Continue BID PO Vancomycin dosing while on abx       Diet: Snacks/Supplements Adult: Gelatein Plus; Between Meals  Low Fiber Diet    DVT Prophylaxis: Enoxaparin (Lovenox) SQ  Major Catheter: not present  Code Status: Full Code           Disposition Plan   Expected discharge: 2 - 3 days, recommended to prior living arrangement once UC flare resolved and medication management decided upon.  Entered: Lamonte Ellison DO 05/08/2021, 3:01 PM       The patient's care was discussed with the Bedside Nurse, Patient and GI Consultant.    Lamonte Ellison DO  Hospitalist Service, [address]  Contact information available via [facility] Paging/Directory  Please see sign in/sign out for up to date coverage  information  ______________________________________________________________________    Interval History     No acute events overnight . Unsure if she will go to [facility] or not today     Four point ROS completed and otherwise negative     Data reviewed today: I reviewed all medications, new labs and imaging results over the last 24 hours. I personally reviewed the EKG tracing showing NSR and the abdominal CT image(s) showing no acute pathology.    Physical Exam   Vital Signs: Temp: 99  F (37.2  C) Temp src: Oral BP: 132/72 Pulse: 101   Resp: 18 SpO2: 97 % O2 Device: None (Room air)    Weight: 247 lbs 4.8 oz    Constitutional: AOX3  Eyes: anicteric  Cardiovascular: Regular rate and rhythm without murmurs or gallops  Pulmonary/Chest: Clear to auscultation bilaterally, with no wheezes or retractions. No respiratory distress.  GI: Soft with good bowel sounds.  Mild TTP all over. Non-distended, with no guarding, no rebound, no peritoneal signs.   Back:  No bony or CVA tenderness   Musculoskeletal:  No edema or clubbing   Skin: Skin is warm and dry. No rash noted to exposed skin areas.   Neurological: Alert and oriented to person, place, and time. Nonfocal exam  Psychiatric:  apprpopriatve    Data   Recent Labs   Lab 05/07/21  0544 05/06/21  1358 05/06/21  1356 05/03/21  0956 05/02/21  0836   WBC 13.4*  --  28.3* 20.3* 20.7*   HGB 11.5*  --  14.0 13.7 14.1   MCV 94  --  95 95 93     --  308 307 331   INR  --   --  0.96  --   --      --  138  --   --    POTASSIUM 4.0  --  4.2  --   --    CHLORIDE 106  --  105  --   --    CO2 28  --  24  --   --    BUN 14  --  19  --   --    CR 0.63  --  0.66  --  0.70   ANIONGAP 6  --  9  --   --    PATRICIA 7.9*  --  8.3*  --   --    GLC 93  --  149*  --   --    ALBUMIN 2.8*  --  3.4  --   --    PROTTOTAL 5.4*  --  6.7*  --   --    BILITOTAL 0.5  --  0.2  --   --    ALKPHOS 42  --  52  --   --    ALT 63*  --  91*  --   --    AST 10  --  21  --   --    LIPASE  --  47*  --   --   --     TROPI  --   --  <0.015  --   --      No results found for this or any previous visit (from the past 24 hour(s)).  Medications     lactated ringers 100 mL/hr at 05/08/21 0735       dicyclomine  20 mg Oral TID w/meals     enoxaparin ANTICOAGULANT  40 mg Subcutaneous Q12H     gabapentin  900 mg Oral TID     heparin lock flush  5-15 mL Intracatheter Q24H     insulin aspart  1-4 Units Subcutaneous Q4H     methocarbamol  500 mg Oral 4x Daily     methylPREDNISolone  32 mg Intravenous Q12H     pantoprazole  40 mg Oral QAM AC     sodium chloride (PF)  10 mL Intracatheter Q8H     sodium chloride (PF)  10 mL Intracatheter Q8H     sodium chloride (PF)  3 mL Intracatheter Q8H     vancomycin  125 mg Oral BID     venlafaxine  225 mg Oral Daily

## 2021-05-08 NOTE — PLAN OF CARE
"BP (!) 147/86 (BP Location: Right leg)   Pulse 98   Temp 98.9  F (37.2  C) (Oral)   Resp 18   Ht 1.6 m (5' 3\")   Wt 112.2 kg (247 lb 4.8 oz)   LMP 04/30/2021 (Exact Date)   SpO2 93%   BMI 43.81 kg/m      3184-0733  Hypertensive, OVSS on RA. Dilaudid x1 and Tylenol x1 for abdominal pain. BG checks, 266, 216 and 196. Low fiber diet, denies nausea. R PIV w/LR @100ml/hr. R DL midline, no blood return noted in either lumen. TPA given, team aware. Up ad miriam in the room. Pt requesting to speak with . Sticky note placed. Will continue to monitor and notify team with changes.   "

## 2021-05-08 NOTE — PROVIDER NOTIFICATION
Notified team that midline still has no blood return noted in both lumens after TPA administration.

## 2021-05-08 NOTE — PROGRESS NOTES
"Colon & Rectal Surgery Progress Note    Subjective:   No BM's overnight. Feels pretty much the same. No n/v.    Objective: BP (!) 142/88 (BP Location: Right leg)   Pulse 84   Temp 98.7  F (37.1  C) (Oral)   Resp 18   Ht 5' 3\"   Wt 247 lb 4.8 oz   LMP 04/30/2021 (Exact Date)   SpO2 91%   BMI 43.81 kg/m       Recent Labs   Lab Test 05/07/21  0544   WBC 13.4*   RBC 3.77*   HGB 11.5*   HCT 35.5   MCV 94   MCH 30.5   MCHC 32.4   RDW 13.1          Intake/Output Summary (Last 24 hours) at 5/8/2021 1011  Last data filed at 5/8/2021 0659  Gross per 24 hour   Intake 2400 ml   Output 200 ml   Net 2200 ml       Abdomen: obese, slightly distended, tender on left side but no rebound or guarding.    Assessment:  Left-sided UC vs. Med-induced colitis. Flex Sig results reviewed. AM Labs pending. C diff -. CMV pending.    Plan:  - LR diet + protein suppl.  - DVT prophylaxis.  - Agree with IV steroids. GI is discussing Entyvio vs. CsA.  - Will follow. No indication for surgery at this time. Not sure of the utility of a DLI in this setting, especially given obesity.    Ernie Pagan M.D., M.Sc.    Colon and Rectal Surgery  Pager: [phone].    "

## 2021-05-09 LAB
ANION GAP SERPL CALCULATED.3IONS-SCNC: 7 MMOL/L (ref 3–14)
BUN SERPL-MCNC: 17 MG/DL (ref 7–30)
CALCIUM SERPL-MCNC: 8.9 MG/DL (ref 8.5–10.1)
CHLORIDE SERPL-SCNC: 106 MMOL/L (ref 94–109)
CO2 SERPL-SCNC: 27 MMOL/L (ref 20–32)
CREAT SERPL-MCNC: 0.65 MG/DL (ref 0.52–1.04)
CRP SERPL-MCNC: 7.7 MG/L (ref 0–8)
ERYTHROCYTE [DISTWIDTH] IN BLOOD BY AUTOMATED COUNT: 12.8 % (ref 10–15)
GFR SERPL CREATININE-BSD FRML MDRD: >90 ML/MIN/{1.73_M2}
GLUCOSE BLDC GLUCOMTR-MCNC: 152 MG/DL (ref 70–99)
GLUCOSE BLDC GLUCOMTR-MCNC: 154 MG/DL (ref 70–99)
GLUCOSE BLDC GLUCOMTR-MCNC: 193 MG/DL (ref 70–99)
GLUCOSE BLDC GLUCOMTR-MCNC: 220 MG/DL (ref 70–99)
GLUCOSE BLDC GLUCOMTR-MCNC: 261 MG/DL (ref 70–99)
GLUCOSE BLDC GLUCOMTR-MCNC: 268 MG/DL (ref 70–99)
GLUCOSE SERPL-MCNC: 133 MG/DL (ref 70–99)
HCT VFR BLD AUTO: 39.5 % (ref 35–47)
HGB BLD-MCNC: 13 G/DL (ref 11.7–15.7)
MCH RBC QN AUTO: 31.5 PG (ref 26.5–33)
MCHC RBC AUTO-ENTMCNC: 32.9 G/DL (ref 31.5–36.5)
MCV RBC AUTO: 96 FL (ref 78–100)
PLATELET # BLD AUTO: 308 10E9/L (ref 150–450)
POTASSIUM SERPL-SCNC: 4 MMOL/L (ref 3.4–5.3)
RBC # BLD AUTO: 4.13 10E12/L (ref 3.8–5.2)
SODIUM SERPL-SCNC: 140 MMOL/L (ref 133–144)
WBC # BLD AUTO: 13.6 10E9/L (ref 4–11)

## 2021-05-09 PROCEDURE — 120N000011 HC R&B TRANSPLANT UMMC

## 2021-05-09 PROCEDURE — 250N000011 HC RX IP 250 OP 636: Performed by: EMERGENCY MEDICINE

## 2021-05-09 PROCEDURE — 85027 COMPLETE CBC AUTOMATED: CPT | Performed by: STUDENT IN AN ORGANIZED HEALTH CARE EDUCATION/TRAINING PROGRAM

## 2021-05-09 PROCEDURE — 36415 COLL VENOUS BLD VENIPUNCTURE: CPT | Performed by: STUDENT IN AN ORGANIZED HEALTH CARE EDUCATION/TRAINING PROGRAM

## 2021-05-09 PROCEDURE — 86140 C-REACTIVE PROTEIN: CPT | Performed by: STUDENT IN AN ORGANIZED HEALTH CARE EDUCATION/TRAINING PROGRAM

## 2021-05-09 PROCEDURE — 250N000012 HC RX MED GY IP 250 OP 636 PS 637: Performed by: STUDENT IN AN ORGANIZED HEALTH CARE EDUCATION/TRAINING PROGRAM

## 2021-05-09 PROCEDURE — 258N000003 HC RX IP 258 OP 636: Performed by: PHYSICIAN ASSISTANT

## 2021-05-09 PROCEDURE — 250N000011 HC RX IP 250 OP 636: Performed by: STUDENT IN AN ORGANIZED HEALTH CARE EDUCATION/TRAINING PROGRAM

## 2021-05-09 PROCEDURE — 99232 SBSQ HOSP IP/OBS MODERATE 35: CPT | Performed by: INTERNAL MEDICINE

## 2021-05-09 PROCEDURE — 99232 SBSQ HOSP IP/OBS MODERATE 35: CPT | Performed by: STUDENT IN AN ORGANIZED HEALTH CARE EDUCATION/TRAINING PROGRAM

## 2021-05-09 PROCEDURE — 250N000011 HC RX IP 250 OP 636: Performed by: PHYSICIAN ASSISTANT

## 2021-05-09 PROCEDURE — 250N000013 HC RX MED GY IP 250 OP 250 PS 637: Performed by: PHYSICIAN ASSISTANT

## 2021-05-09 PROCEDURE — 80048 BASIC METABOLIC PNL TOTAL CA: CPT | Performed by: STUDENT IN AN ORGANIZED HEALTH CARE EDUCATION/TRAINING PROGRAM

## 2021-05-09 PROCEDURE — 999N001017 HC STATISTIC GLUCOSE BY METER IP

## 2021-05-09 PROCEDURE — 250N000013 HC RX MED GY IP 250 OP 250 PS 637: Performed by: STUDENT IN AN ORGANIZED HEALTH CARE EDUCATION/TRAINING PROGRAM

## 2021-05-09 RX ORDER — PREDNISONE 20 MG/1
20 TABLET ORAL ONCE
Status: COMPLETED | OUTPATIENT
Start: 2021-05-09 | End: 2021-05-09

## 2021-05-09 RX ADMIN — INSULIN ASPART 2 UNITS: 100 INJECTION, SOLUTION INTRAVENOUS; SUBCUTANEOUS at 20:04

## 2021-05-09 RX ADMIN — Medication 10 ML: at 15:22

## 2021-05-09 RX ADMIN — ACETAMINOPHEN 650 MG: 325 TABLET, FILM COATED ORAL at 23:25

## 2021-05-09 RX ADMIN — Medication 1 MG: at 23:25

## 2021-05-09 RX ADMIN — VANCOMYCIN HYDROCHLORIDE 125 MG: 125 CAPSULE ORAL at 20:00

## 2021-05-09 RX ADMIN — PANTOPRAZOLE SODIUM 40 MG: 40 TABLET, DELAYED RELEASE ORAL at 07:25

## 2021-05-09 RX ADMIN — VANCOMYCIN HYDROCHLORIDE 125 MG: 125 CAPSULE ORAL at 07:25

## 2021-05-09 RX ADMIN — HYDROMORPHONE HYDROCHLORIDE 0.5 MG: 1 INJECTION, SOLUTION INTRAMUSCULAR; INTRAVENOUS; SUBCUTANEOUS at 23:26

## 2021-05-09 RX ADMIN — INSULIN ASPART 1 UNITS: 100 INJECTION, SOLUTION INTRAVENOUS; SUBCUTANEOUS at 16:25

## 2021-05-09 RX ADMIN — ENOXAPARIN SODIUM 40 MG: 100 INJECTION SUBCUTANEOUS at 23:33

## 2021-05-09 RX ADMIN — INSULIN ASPART 2 UNITS: 100 INJECTION, SOLUTION INTRAVENOUS; SUBCUTANEOUS at 23:25

## 2021-05-09 RX ADMIN — METHOCARBAMOL 500 MG: 500 TABLET, FILM COATED ORAL at 15:21

## 2021-05-09 RX ADMIN — VENLAFAXINE HYDROCHLORIDE 225 MG: 75 CAPSULE, EXTENDED RELEASE ORAL at 07:25

## 2021-05-09 RX ADMIN — DICYCLOMINE HYDROCHLORIDE 20 MG: 20 TABLET ORAL at 17:51

## 2021-05-09 RX ADMIN — INSULIN ASPART 1 UNITS: 100 INJECTION, SOLUTION INTRAVENOUS; SUBCUTANEOUS at 12:05

## 2021-05-09 RX ADMIN — DICYCLOMINE HYDROCHLORIDE 20 MG: 20 TABLET ORAL at 12:01

## 2021-05-09 RX ADMIN — HYDROMORPHONE HYDROCHLORIDE 0.5 MG: 1 INJECTION, SOLUTION INTRAMUSCULAR; INTRAVENOUS; SUBCUTANEOUS at 03:54

## 2021-05-09 RX ADMIN — PREDNISONE 20 MG: 20 TABLET ORAL at 17:51

## 2021-05-09 RX ADMIN — METHOCARBAMOL 500 MG: 500 TABLET, FILM COATED ORAL at 12:01

## 2021-05-09 RX ADMIN — METHOCARBAMOL 500 MG: 500 TABLET, FILM COATED ORAL at 07:25

## 2021-05-09 RX ADMIN — DICYCLOMINE HYDROCHLORIDE 20 MG: 20 TABLET ORAL at 07:25

## 2021-05-09 RX ADMIN — GABAPENTIN 900 MG: 300 CAPSULE ORAL at 07:24

## 2021-05-09 RX ADMIN — METHYLPREDNISOLONE SODIUM SUCCINATE 32 MG: 40 INJECTION, POWDER, FOR SOLUTION INTRAMUSCULAR; INTRAVENOUS at 23:26

## 2021-05-09 RX ADMIN — HYDROMORPHONE HYDROCHLORIDE 0.5 MG: 1 INJECTION, SOLUTION INTRAMUSCULAR; INTRAVENOUS; SUBCUTANEOUS at 16:25

## 2021-05-09 RX ADMIN — SODIUM CHLORIDE, POTASSIUM CHLORIDE, SODIUM LACTATE AND CALCIUM CHLORIDE: 600; 310; 30; 20 INJECTION, SOLUTION INTRAVENOUS at 23:27

## 2021-05-09 RX ADMIN — INSULIN ASPART 1 UNITS: 100 INJECTION, SOLUTION INTRAVENOUS; SUBCUTANEOUS at 03:49

## 2021-05-09 RX ADMIN — SODIUM CHLORIDE, POTASSIUM CHLORIDE, SODIUM LACTATE AND CALCIUM CHLORIDE: 600; 310; 30; 20 INJECTION, SOLUTION INTRAVENOUS at 13:25

## 2021-05-09 RX ADMIN — INSULIN ASPART 1 UNITS: 100 INJECTION, SOLUTION INTRAVENOUS; SUBCUTANEOUS at 07:31

## 2021-05-09 RX ADMIN — ENOXAPARIN SODIUM 40 MG: 100 INJECTION SUBCUTANEOUS at 12:01

## 2021-05-09 RX ADMIN — GABAPENTIN 900 MG: 300 CAPSULE ORAL at 13:25

## 2021-05-09 RX ADMIN — METHOCARBAMOL 500 MG: 500 TABLET, FILM COATED ORAL at 20:00

## 2021-05-09 RX ADMIN — METHYLPREDNISOLONE SODIUM SUCCINATE 32 MG: 40 INJECTION, POWDER, FOR SOLUTION INTRAMUSCULAR; INTRAVENOUS at 12:01

## 2021-05-09 RX ADMIN — GABAPENTIN 900 MG: 300 CAPSULE ORAL at 20:00

## 2021-05-09 ASSESSMENT — ACTIVITIES OF DAILY LIVING (ADL)
ADLS_ACUITY_SCORE: 13
ADLS_ACUITY_SCORE: 15

## 2021-05-09 NOTE — PLAN OF CARE
"BP (!) 161/87 (BP Location: Right leg)   Pulse 86   Temp 98.9  F (37.2  C) (Oral)   Resp 16   Ht 1.6 m (5' 3\")   Wt 112.2 kg (247 lb 4.8 oz)   LMP 04/30/2021 (Exact Date)   SpO2 93%   BMI 43.81 kg/m      0465-0148  HTN, team aware, OVSS on RA. T max 99.2. BG checks, 154, 220 and 193. Insulin given per sliding scale. Abdominal pain treated with scheduled robaxin and prn dilaudid x1. Low fiber diet, tolerating. R DL midline hep locked, notified team about no blood return in either lumen. R PIV w.LR @100ml/hr. 1 formed brown stool, one bloody/mucous this shift. Voiding, not saving. Skin intact. Up ad miriam in room. Plan for pt to talk to [place] tomorrow. Last dose of methylpred tonight. Plan to start 40 mg of prednisone daily tomorrow.  Will continue to monitor and notify team with changes.   "

## 2021-05-09 NOTE — PLAN OF CARE
1540  0730    47 y/o F w/hx of melanoma w/nivolumab induced colitis (s/p treatment w/infliximab & vedolizumab); giardia; c.diff; seronegative RA; steroid induced hyperglycemia; CVA w/mild left-sided weakness (2004); hyperlipidemia, hypertension, depression. Recent admission 4/26 - 5/3/21 (newly diagnosed w/UC). Admitted from ED 5/6/21 w/recurrent abdominal pain, hematochezia.     Vitals: Temp: 98.6  F (37  C) Temp src: Oral BP: (!) 148/84 Pulse: 88   Resp: 16 SpO2: 92 % O2 Device: None (Room air)     Pain/Nausea: Denies nausea; endorsed abdominal pain.   Interventions: 0.5mg IV dilaudid x2, 650mg tylenol x1.   Diet: Low fiber.   BG orders: Q4hr checks w/s/s novolog.   LDA: RPIV w/LR @100mL/hr; R double lumen midline, saline locked.   GI: Stool x1 of bright red mucus   : Voiding, not saving.   Skin: Pale, molly.   Neuro: A&Ox4. Tearful & anxious this shift.   Mobility: Independent in room.   Education: Discussed ileostomy/treatment plan.   Plan: Continue to monitor.     Of note this shift: Pt complained of pain at midline site which was resolved w/repositioning.

## 2021-05-09 NOTE — PROGRESS NOTES
"[facility]    Medicine Progress Note - Hospitalist Service, Gold 11       Date of Admission:  5/6/2021  Assessment & Plan Doretha Fernandez is a 46 year old female admitted on 5/6/2021. She has PMH of melanoma w/ nivolumab induced colitis s/p treatment with infliximab and vedolizumab, hx of giardia and C.diff infection (most recently 4/7/21), seronegative RA on prednisone and tocilizumab, Steroid induced hyperglycemia, CVA w/ mild left-sides weakness (2004), HLD, HTN, and Depression  w/ recent admission 4/26-5/3/21 with new UC diagnosis who presents to the ED for evaluation of recurrent abdominal pain and hematochezia. Patient admitted to Medicine w/ GI consult. Underwent flex sig which showed Gunter Score 3 UC and patient started on IV steroids.     Acute flare of ulcerative proctocolitis  Leukocytosis  Lactic Acidosis, resolved  Patient presents with~ 12 hour hx of LLQ abdominal pain, hematochezia, clot, and mucus. No fever, though reports feeling chilled.  Currently on prednisone taper (maintains on 40 mg of prednisone daily) GI consulted on admission and felt to be likely UC flare. CT AP w/o signs of abscess or perforation. She underwent flex sig day after admission and confirmed Gunter Score 3 UC and biopsies taken. Started on IV solumedrol with plans for possibly starting cellcept. After discussion with GI and surgery patient may get a diverting ileostomy here versus the [facility] .  - GI consult appreciate assistance and recommendations  - CRS surgery consult no indication for surgery  - Continue IVF   - IV solumedrol 32 mg BID    Non cardiac chest pain  Patient reports that she has andreas experiencing \"chest fullness\" and a burning sensation in her epigastric region since before recent admission 4/26/21. No aggravating or alleviating factors PTA. VSS on admission. No O2 use. CTPE neg. Received GI cocktail in ED with mild improvement in sx. EKG normal and Trop negative.          "       Seronegative RA  Follows with Rheumatology at [place], DR. Brady. Previously  maintained on tocilizumab. Reports prednisone taper since 8/2020- was on 5 mg prednisone daily prior to recent UC diagnosis.   - F/u with Rheumatology as scheduled     Steroid induced hyperglycemia  A1c 6.1 (4/5/21). BG stable on admission. PTA metformin  - HOLD metformin  - LSSI q4h while NPO  - BG checks q4h      HTN  BP mildly elevated on admission on PTA Norvasc  - will hold until tolerating more intake or bp rises     HLD: PTA statin  - Did not order      Anxiety  Severe in setting of a/c medical, reports of challenges with teenage daughters PTA, and concern for job loss with recent sick leave/abscence. PTA Effexor  - Hydroxyzine ordered for anxiety  - Continue to provide support  - May benefit from health Phycology consult      Hx of Malignant Melanoma  Dx 11/2017. Follows with Dr. Giron at [place]. C/b check point inhibitor colitis s/p treatment with  infliximab and vedolizumab. Now in remission   - Monitor   - F/u with Oncology as schedduled     Hx of recurrent C. Diff infection  Positive PCR at outside facility 4/7- on treatment w/ PO Vancomycin taper w/ PCR now negative  - Continue BID PO Vancomycin dosing while on abx       Diet: Snacks/Supplements Adult: Gelatein Plus; Between Meals  Low Fiber Diet    DVT Prophylaxis: Enoxaparin (Lovenox) SQ  Major Catheter: not present  Code Status: Full Code           Disposition Plan   Expected discharge: 1-2 days, recommended to prior living arrangement once UC flare resolved and medication management decided upon.  Entered: Lamonte Ellison DO 05/09/2021, 12:33 PM       The patient's care was discussed with the Bedside Nurse, Patient and GI Consultant.    Lamonte Ellison DO  Hospitalist Service, [address]  Contact information available via [facility] Paging/Directory  Please see sign in/sign out for up to date coverage  information  ______________________________________________________________________    Interval History     No events overnight. She feels about the same today.Is waiting to talk to her [facility] team on Monday re transfer and surgery.    Four point ROS completed and otherwise negative     Data reviewed today: I reviewed all medications, new labs and imaging results over the last 24 hours. I personally reviewed the EKG tracing showing NSR and the abdominal CT image(s) showing no acute pathology.    Physical Exam   Vital Signs: Temp: 99.2  F (37.3  C) Temp src: Oral BP: (!) 145/79 Pulse: 92   Resp: 16 SpO2: 93 % O2 Device: None (Room air)    Weight: 247 lbs 4.8 oz    Constitutional: AOX3  Eyes: anicteric  Cardiovascular: Regular rate and rhythm without murmurs or gallops  Pulmonary/Chest: Clear to auscultation bilaterally, with no wheezes or retractions. No respiratory distress.  GI: Soft with good bowel sounds.  Mild TTP all over. Non-distended, with no guarding, no rebound, no peritoneal signs.   Back:  No bony or CVA tenderness   Musculoskeletal:  No edema or clubbing   Skin: Skin is warm and dry. No rash noted to exposed skin areas.   Neurological: Alert and oriented to person, place, and time. Nonfocal exam  Psychiatric:  apprpopriatve    Data   Recent Labs   Lab 05/09/21  0718 05/08/21  1739 05/08/21  1627 05/07/21  0544 05/06/21  1358 05/06/21  1356   WBC 13.6* 17.8* Canceled, Test credited 13.4*  --  28.3*   HGB 13.0 12.7 Canceled, Test credited 11.5*  --  14.0   MCV 96 93 Canceled, Test credited 94  --  95    320 Canceled, Test credited 247  --  308   INR  --   --   --   --   --  0.96     --  136 140  --  138   POTASSIUM 4.0  --  4.1 4.0  --  4.2   CHLORIDE 106  --  106 106  --  105   CO2 27  --  22 28  --  24   BUN 17  --  16 14  --  19   CR 0.65  --  0.71 0.63  --  0.66   ANIONGAP 7  --  8 6  --  9   PATRICIA 8.9  --  9.2 7.9*  --  8.3*   *  --  196* 93  --  149*   ALBUMIN  --   --   --  2.8*  --   3.4   PROTTOTAL  --   --   --  5.4*  --  6.7*   BILITOTAL  --   --   --  0.5  --  0.2   ALKPHOS  --   --   --  42  --  52   ALT  --   --   --  63*  --  91*   AST  --   --   --  10  --  21   LIPASE  --   --   --   --  47*  --    TROPI  --   --   --   --   --  <0.015     No results found for this or any previous visit (from the past 24 hour(s)).  Medications     lactated ringers 100 mL/hr at 05/08/21 1900       dicyclomine  20 mg Oral TID w/meals     enoxaparin ANTICOAGULANT  40 mg Subcutaneous Q12H     gabapentin  900 mg Oral TID     heparin lock flush  5-15 mL Intracatheter Q24H     insulin aspart  1-4 Units Subcutaneous Q4H     methocarbamol  500 mg Oral 4x Daily     methylPREDNISolone  32 mg Intravenous Q12H     pantoprazole  40 mg Oral QAM AC     sodium chloride (PF)  10 mL Intracatheter Q8H     sodium chloride (PF)  10 mL Intracatheter Q8H     sodium chloride (PF)  3 mL Intracatheter Q8H     vancomycin  125 mg Oral BID     venlafaxine  225 mg Oral Daily

## 2021-05-09 NOTE — PROGRESS NOTES
"Medicine Progress Note  5/8/2021    S: No BM overnight. Passed blood with minimal stool x2 today. Ongoing left sided abdominal pain. No fever. 4 point ROS otherwise negative.     O:  Vital signs:  Vital signs:  Temp: 98.2  F (36.8  C) Temp src: Oral BP: 138/80 Pulse: 94   Resp: 18 SpO2: 95 % O2 Device: None (Room air) Oxygen Delivery: 2 LPM Height: 160 cm (5' 3\") Weight: 112.2 kg (247 lb 4.8 oz)  Estimated body mass index is 43.81 kg/m  as calculated from the following:    Height as of this encounter: 1.6 m (5' 3\").    Weight as of this encounter: 112.2 kg (247 lb 4.8 oz).  Gen: NAD  HEENT: MMM  Neck: full ROM  CV: normal rate  Pulm: non labored  Abd: soft, non distended, tender to palpation RLQ without rebound.   Skin: WWP  Neuro: AAOx3  MSK: Moves all extremities equally  Psych: normal affect    Labs reviewed.  CRP 13 -> 11  WBC 23.3 -> 13.4 -> 17.8  C diff negative  Pathology in process    Imaging reviewed.  No new imaging.    Assessment and plan:  45 yo F with melanoma h/o nivolumab induced colitis treated with Remicade and Entyvio now in remission, h/o giardia infection, h/o C.diff infection 8 years ago, seronegative RA on prednisone & tocilizumab, recently strep throat treated with penicillin c/b C.diff and started PO Vancomycin 4/7 without improvement. Recently hospitalized 4/26-5/3 with new UC diagnosis, transitioned from IV steroids to PO on 5/3 with a plan to establish care at [facility] and start vedolizumab, now back in [facility] ED on 5/6 with recurrent abdominal pain and hematochezia.     # Acute flare of ulcerative proctosigmoiditis  # H/o checkpoint inhibitor induced colitis s/p Remicade  # RA on tocilizumab & Prednisone  #Tenesmus, bloody diarrhea  Essentially stable last two days. Minimal clinical improvement. Physical exam stable. Labs minimally improved. C diff negative. Biopsies still in process.     Recommendations  - continue methylprednisolone 32mg BID IV  - continue DVT prophylaxis  - follow up " pathology  - daily CRP and CBC  - track # daily stools and % with blood  - we will continue discussion with colorectal surgery re vedolizumab vs surgical management. She failed transition to PO steroids once already and clinical improvement since starting methylpred so far has been underwhelming.     Josse Coyle MD    [facility]  Division of Gastroenterology  [phone]

## 2021-05-09 NOTE — PROGRESS NOTES
Surgery Progress Note    S: NAEO. Pain about the same. Multiple bloody BMs in last 24hrs. Denies n/v.    O:  Temp:  [98.2  F (36.8  C)-99  F (37.2  C)] 98.7  F (37.1  C)  Pulse:  [] 74  Resp:  [15-18] 15  BP: (132-151)/(72-88) 151/88  SpO2:  [92 %-97 %] 93 %    Gen: NAD, resting comfortably  CV: RRR  Resp: nonlabored respirations, comfortable on RA  Abd: soft, nd, minimal ttp    I/O last 3 completed shifts:  In: 1200 [I.V.:1200]  Out: -     A/P: Doretha Fernandez is a 46 year old female with UC vs. Medication induced colitis. Biopsies pending from flex sig.     - Continue IV steroids per GI  - DVT ppx  - low fiber diet    No acute indication for surgical intervention. CRS will follow.    Patient seen and d/w fellow, Dr. Kauffman, and d/w staff Dr. Ej Manley MD (PGY-1)  Surgery

## 2021-05-10 LAB
ANION GAP SERPL CALCULATED.3IONS-SCNC: 6 MMOL/L (ref 3–14)
BUN SERPL-MCNC: 15 MG/DL (ref 7–30)
CALCIUM SERPL-MCNC: 9.1 MG/DL (ref 8.5–10.1)
CHLORIDE SERPL-SCNC: 105 MMOL/L (ref 94–109)
CO2 SERPL-SCNC: 28 MMOL/L (ref 20–32)
CREAT SERPL-MCNC: 0.56 MG/DL (ref 0.52–1.04)
CRP SERPL-MCNC: 6.4 MG/L (ref 0–8)
ERYTHROCYTE [DISTWIDTH] IN BLOOD BY AUTOMATED COUNT: 12.9 % (ref 10–15)
GFR SERPL CREATININE-BSD FRML MDRD: >90 ML/MIN/{1.73_M2}
GLUCOSE BLDC GLUCOMTR-MCNC: 134 MG/DL (ref 70–99)
GLUCOSE BLDC GLUCOMTR-MCNC: 146 MG/DL (ref 70–99)
GLUCOSE BLDC GLUCOMTR-MCNC: 154 MG/DL (ref 70–99)
GLUCOSE BLDC GLUCOMTR-MCNC: 273 MG/DL (ref 70–99)
GLUCOSE BLDC GLUCOMTR-MCNC: 285 MG/DL (ref 70–99)
GLUCOSE SERPL-MCNC: 145 MG/DL (ref 70–99)
HCT VFR BLD AUTO: 37.7 % (ref 35–47)
HGB BLD-MCNC: 12.5 G/DL (ref 11.7–15.7)
MCH RBC QN AUTO: 32 PG (ref 26.5–33)
MCHC RBC AUTO-ENTMCNC: 33.2 G/DL (ref 31.5–36.5)
MCV RBC AUTO: 96 FL (ref 78–100)
PLATELET # BLD AUTO: 300 10E9/L (ref 150–450)
POTASSIUM SERPL-SCNC: 4 MMOL/L (ref 3.4–5.3)
RBC # BLD AUTO: 3.91 10E12/L (ref 3.8–5.2)
SODIUM SERPL-SCNC: 139 MMOL/L (ref 133–144)
WBC # BLD AUTO: 10.8 10E9/L (ref 4–11)

## 2021-05-10 PROCEDURE — 36415 COLL VENOUS BLD VENIPUNCTURE: CPT | Performed by: STUDENT IN AN ORGANIZED HEALTH CARE EDUCATION/TRAINING PROGRAM

## 2021-05-10 PROCEDURE — 120N000011 HC R&B TRANSPLANT UMMC

## 2021-05-10 PROCEDURE — 86140 C-REACTIVE PROTEIN: CPT | Performed by: STUDENT IN AN ORGANIZED HEALTH CARE EDUCATION/TRAINING PROGRAM

## 2021-05-10 PROCEDURE — 99221 1ST HOSP IP/OBS SF/LOW 40: CPT | Mod: GC | Performed by: SURGERY

## 2021-05-10 PROCEDURE — 258N000003 HC RX IP 258 OP 636: Performed by: PHYSICIAN ASSISTANT

## 2021-05-10 PROCEDURE — 250N000012 HC RX MED GY IP 250 OP 636 PS 637: Performed by: STUDENT IN AN ORGANIZED HEALTH CARE EDUCATION/TRAINING PROGRAM

## 2021-05-10 PROCEDURE — 999N001017 HC STATISTIC GLUCOSE BY METER IP

## 2021-05-10 PROCEDURE — 99233 SBSQ HOSP IP/OBS HIGH 50: CPT | Performed by: INTERNAL MEDICINE

## 2021-05-10 PROCEDURE — 250N000013 HC RX MED GY IP 250 OP 250 PS 637: Performed by: PHYSICIAN ASSISTANT

## 2021-05-10 PROCEDURE — 250N000013 HC RX MED GY IP 250 OP 250 PS 637: Performed by: NURSE PRACTITIONER

## 2021-05-10 PROCEDURE — 250N000013 HC RX MED GY IP 250 OP 250 PS 637: Performed by: STUDENT IN AN ORGANIZED HEALTH CARE EDUCATION/TRAINING PROGRAM

## 2021-05-10 PROCEDURE — 85027 COMPLETE CBC AUTOMATED: CPT | Performed by: STUDENT IN AN ORGANIZED HEALTH CARE EDUCATION/TRAINING PROGRAM

## 2021-05-10 PROCEDURE — 250N000011 HC RX IP 250 OP 636: Performed by: PHYSICIAN ASSISTANT

## 2021-05-10 PROCEDURE — 250N000011 HC RX IP 250 OP 636: Performed by: EMERGENCY MEDICINE

## 2021-05-10 PROCEDURE — 99232 SBSQ HOSP IP/OBS MODERATE 35: CPT | Performed by: STUDENT IN AN ORGANIZED HEALTH CARE EDUCATION/TRAINING PROGRAM

## 2021-05-10 PROCEDURE — 250N000011 HC RX IP 250 OP 636: Performed by: STUDENT IN AN ORGANIZED HEALTH CARE EDUCATION/TRAINING PROGRAM

## 2021-05-10 PROCEDURE — 80048 BASIC METABOLIC PNL TOTAL CA: CPT | Performed by: STUDENT IN AN ORGANIZED HEALTH CARE EDUCATION/TRAINING PROGRAM

## 2021-05-10 RX ORDER — HYDROCORTISONE 100 MG/60ML
100 SUSPENSION RECTAL 2 TIMES DAILY
Status: DISCONTINUED | OUTPATIENT
Start: 2021-05-10 | End: 2021-05-11 | Stop reason: HOSPADM

## 2021-05-10 RX ORDER — PREDNISONE 20 MG/1
40 TABLET ORAL DAILY
Status: DISCONTINUED | OUTPATIENT
Start: 2021-05-10 | End: 2021-05-10 | Stop reason: ALTCHOICE

## 2021-05-10 RX ORDER — NALOXONE HYDROCHLORIDE 0.4 MG/ML
0.4 INJECTION, SOLUTION INTRAMUSCULAR; INTRAVENOUS; SUBCUTANEOUS
Status: DISCONTINUED | OUTPATIENT
Start: 2021-05-10 | End: 2021-05-11 | Stop reason: HOSPADM

## 2021-05-10 RX ORDER — PREDNISOLONE 5 MG/1
20 TABLET ORAL DAILY
Status: DISCONTINUED | OUTPATIENT
Start: 2021-05-10 | End: 2021-05-10

## 2021-05-10 RX ORDER — PREDNISONE 20 MG/1
20 TABLET ORAL ONCE
Status: COMPLETED | OUTPATIENT
Start: 2021-05-10 | End: 2021-05-10

## 2021-05-10 RX ORDER — NALOXONE HYDROCHLORIDE 0.4 MG/ML
0.2 INJECTION, SOLUTION INTRAMUSCULAR; INTRAVENOUS; SUBCUTANEOUS
Status: DISCONTINUED | OUTPATIENT
Start: 2021-05-10 | End: 2021-05-11 | Stop reason: HOSPADM

## 2021-05-10 RX ORDER — PREDNISOLONE 5 MG/1
30 TABLET ORAL 2 TIMES DAILY
Status: DISCONTINUED | OUTPATIENT
Start: 2021-05-11 | End: 2021-05-10

## 2021-05-10 RX ORDER — OXYCODONE HYDROCHLORIDE 5 MG/1
5 TABLET ORAL EVERY 4 HOURS PRN
Status: DISCONTINUED | OUTPATIENT
Start: 2021-05-10 | End: 2021-05-11 | Stop reason: HOSPADM

## 2021-05-10 RX ORDER — ACETAMINOPHEN 325 MG/1
975 TABLET ORAL EVERY 8 HOURS PRN
Status: DISCONTINUED | OUTPATIENT
Start: 2021-05-10 | End: 2021-05-11 | Stop reason: HOSPADM

## 2021-05-10 RX ORDER — HYDROXYZINE HYDROCHLORIDE 25 MG/1
50 TABLET, FILM COATED ORAL EVERY 6 HOURS PRN
Status: DISCONTINUED | OUTPATIENT
Start: 2021-05-10 | End: 2021-05-11 | Stop reason: HOSPADM

## 2021-05-10 RX ORDER — HYDROCORTISONE 100 MG/60ML
100 SUSPENSION RECTAL 2 TIMES DAILY
Status: DISCONTINUED | OUTPATIENT
Start: 2021-05-10 | End: 2021-05-10

## 2021-05-10 RX ADMIN — INSULIN ASPART 1 UNITS: 100 INJECTION, SOLUTION INTRAVENOUS; SUBCUTANEOUS at 11:52

## 2021-05-10 RX ADMIN — GABAPENTIN 900 MG: 300 CAPSULE ORAL at 07:46

## 2021-05-10 RX ADMIN — DICYCLOMINE HYDROCHLORIDE 20 MG: 20 TABLET ORAL at 07:46

## 2021-05-10 RX ADMIN — METHYLPREDNISOLONE SODIUM SUCCINATE 32 MG: 40 INJECTION, POWDER, FOR SOLUTION INTRAMUSCULAR; INTRAVENOUS at 11:36

## 2021-05-10 RX ADMIN — ACETAMINOPHEN 650 MG: 325 TABLET, FILM COATED ORAL at 07:46

## 2021-05-10 RX ADMIN — PREDNISONE 20 MG: 20 TABLET ORAL at 14:49

## 2021-05-10 RX ADMIN — HYDROMORPHONE HYDROCHLORIDE 0.5 MG: 1 INJECTION, SOLUTION INTRAMUSCULAR; INTRAVENOUS; SUBCUTANEOUS at 09:18

## 2021-05-10 RX ADMIN — INSULIN ASPART 2 UNITS: 100 INJECTION, SOLUTION INTRAVENOUS; SUBCUTANEOUS at 20:14

## 2021-05-10 RX ADMIN — INSULIN ASPART 2 UNITS: 100 INJECTION, SOLUTION INTRAVENOUS; SUBCUTANEOUS at 15:49

## 2021-05-10 RX ADMIN — DICYCLOMINE HYDROCHLORIDE 20 MG: 20 TABLET ORAL at 18:15

## 2021-05-10 RX ADMIN — HYDROCORTISONE 1 ENEMA: 100 ENEMA RECTAL at 20:17

## 2021-05-10 RX ADMIN — METHOCARBAMOL 500 MG: 500 TABLET, FILM COATED ORAL at 07:46

## 2021-05-10 RX ADMIN — METHOCARBAMOL 500 MG: 500 TABLET, FILM COATED ORAL at 20:15

## 2021-05-10 RX ADMIN — ENOXAPARIN SODIUM 40 MG: 100 INJECTION SUBCUTANEOUS at 11:49

## 2021-05-10 RX ADMIN — METHOCARBAMOL 500 MG: 500 TABLET, FILM COATED ORAL at 11:49

## 2021-05-10 RX ADMIN — INSULIN ASPART 1 UNITS: 100 INJECTION, SOLUTION INTRAVENOUS; SUBCUTANEOUS at 04:57

## 2021-05-10 RX ADMIN — ACETAMINOPHEN 650 MG: 325 TABLET, FILM COATED ORAL at 11:49

## 2021-05-10 RX ADMIN — HYDROCORTISONE 1 ENEMA: 100 ENEMA RECTAL at 15:54

## 2021-05-10 RX ADMIN — OXYCODONE HYDROCHLORIDE 5 MG: 5 TABLET ORAL at 13:53

## 2021-05-10 RX ADMIN — GABAPENTIN 900 MG: 300 CAPSULE ORAL at 20:15

## 2021-05-10 RX ADMIN — HYDROXYZINE HYDROCHLORIDE 50 MG: 25 TABLET, FILM COATED ORAL at 20:14

## 2021-05-10 RX ADMIN — PANTOPRAZOLE SODIUM 40 MG: 40 TABLET, DELAYED RELEASE ORAL at 07:46

## 2021-05-10 RX ADMIN — GABAPENTIN 900 MG: 300 CAPSULE ORAL at 13:16

## 2021-05-10 RX ADMIN — PREDNISONE 40 MG: 20 TABLET ORAL at 13:16

## 2021-05-10 RX ADMIN — SODIUM CHLORIDE, POTASSIUM CHLORIDE, SODIUM LACTATE AND CALCIUM CHLORIDE: 600; 310; 30; 20 INJECTION, SOLUTION INTRAVENOUS at 20:38

## 2021-05-10 RX ADMIN — OXYCODONE HYDROCHLORIDE 5 MG: 5 TABLET ORAL at 21:51

## 2021-05-10 RX ADMIN — METHOCARBAMOL 500 MG: 500 TABLET, FILM COATED ORAL at 15:49

## 2021-05-10 RX ADMIN — HYDROXYZINE HYDROCHLORIDE 25 MG: 25 TABLET, FILM COATED ORAL at 11:49

## 2021-05-10 RX ADMIN — DICYCLOMINE HYDROCHLORIDE 20 MG: 20 TABLET ORAL at 11:49

## 2021-05-10 RX ADMIN — VENLAFAXINE HYDROCHLORIDE 225 MG: 75 CAPSULE, EXTENDED RELEASE ORAL at 07:46

## 2021-05-10 RX ADMIN — VANCOMYCIN HYDROCHLORIDE 125 MG: 125 CAPSULE ORAL at 07:46

## 2021-05-10 ASSESSMENT — ACTIVITIES OF DAILY LIVING (ADL)
ADLS_ACUITY_SCORE: 13

## 2021-05-10 ASSESSMENT — MIFFLIN-ST. JEOR: SCORE: 1753.57

## 2021-05-10 NOTE — PLAN OF CARE
"BP (!) 166/93 (BP Location: Right leg)   Pulse 75   Temp 98.4  F (36.9  C) (Oral)   Resp 18   Ht 1.6 m (5' 3\")   Wt 113.9 kg (251 lb 3.2 oz)   LMP 04/30/2021 (Exact Date)   SpO2 94%   BMI 44.50 kg/m      9596-0670. Slightly hypertensive, OVSS on RA.  & 154. Pt reporting uncontrolled abdominal pain. PRN oxy, dilaudid, and tylenol all given x 1. Hydrocort enema ordered, awaiting arrival from pharmacy. Tolerating a low fiber diet, fair oral intake. R PIV with LR @ 100. R midline is hep locked. Voiding adequately, not saving. Pt had 3 large, bloody BM's this morning. Skin is diaphoretic and molly. Plan for continued oral steroids + enemas. Will continue to monitor and update with any changes.   "

## 2021-05-10 NOTE — PROGRESS NOTES
"[facility]    Medicine Progress Note - Hospitalist Service, Gold 11       Date of Admission:  5/6/2021  Assessment & Plan Doretha Fernandez is a 46 year old female admitted on 5/6/2021. She has PMH of melanoma w/ nivolumab induced colitis s/p treatment with infliximab and vedolizumab, hx of giardia and C.diff infection (most recently 4/7/21), seronegative RA on prednisone and tocilizumab, Steroid induced hyperglycemia, CVA w/ mild left-sides weakness (2004), HLD, HTN, and Depression  w/ recent admission 4/26-5/3/21 with new UC diagnosis who presents to the ED for evaluation of recurrent abdominal pain and hematochezia. Patient admitted to Medicine w/ GI consult. Underwent flex sig which showed Gunter Score 3 UC and patient started on IV steroids.     Acute flare of ulcerative proctocolitis  Leukocytosis  Lactic Acidosis, resolved  Patient presents with~ 12 hour hx of LLQ abdominal pain, hematochezia, clot, and mucus. No fever, though reports feeling chilled.  Currently on prednisone taper (maintains on 40 mg of prednisone daily) GI consulted on admission and felt to be likely UC flare. CT AP w/o signs of abscess or perforation. She underwent flex sig day after admission and confirmed Gunter Score 3 UC and biopsies taken. Started on IV solumedrol with plans for possibly starting cellcept. After discussion with GI and surgery patient may get a diverting ileostomy here versus the [place] .  - GI consult appreciate assistance and recommendations  - CRS surgery consult no indication for surgery  - Continue IVF   - After discussion with GI switching to oral steroids today     Non cardiac chest pain  Patient reports that she has andreas experiencing \"chest fullness\" and a burning sensation in her epigastric region since before recent admission 4/26/21. No aggravating or alleviating factors PTA. VSS on admission. No O2 use. CTPE neg. Received GI cocktail in ED with mild improvement in sx. EKG " normal and Trop negative.                Seronegative RA  Follows with Rheumatology at [facility], DR. Brady. Previously  maintained on tocilizumab. Reports prednisone taper since 8/2020- was on 5 mg prednisone daily prior to recent UC diagnosis.   - F/u with Rheumatology as scheduled     Steroid induced hyperglycemia  A1c 6.1 (4/5/21). BG stable on admission. PTA metformin  - HOLD metformin  - LSSI q4h while NPO  - BG checks q4h      HTN  BP mildly elevated on admission on PTA Norvasc  - will hold until tolerating more intake or bp rises     HLD: PTA statin  - Did not order      Anxiety  Severe in setting of a/c medical, reports of challenges with teenage daughters PTA, and concern for job loss with recent sick leave/abscence. PTA Effexor  - Hydroxyzine ordered for anxiety  - Continue to provide support  - May benefit from health Phycology consult      Hx of Malignant Melanoma  Dx 11/2017. Follows with Dr. Giron at [facility]. C/b check point inhibitor colitis s/p treatment with  infliximab and vedolizumab. Now in remission   - Monitor   - F/u with Oncology as schedduled     Hx of recurrent C. Diff infection  Positive PCR at outside facility 4/7- on treatment w/ PO Vancomycin taper w/ PCR now negative  - Continue BID PO Vancomycin dosing while on abx    Non-severe malnutrition in the context of acute on chronic illness   Per RD. Nutrition as able         Diet: Snacks/Supplements Adult: Gelatein Plus; Between Meals  Low Fiber Diet    DVT Prophylaxis: Enoxaparin (Lovenox) SQ  Major Catheter: not present  Code Status: Full Code           Disposition Plan   Expected discharge: 1-2 days, recommended to prior living arrangement once UC flare resolved and medication management decided upon.  Entered: Lamonte Ellison DO 05/10/2021, 12:31 PM       The patient's care was discussed with the Bedside Nurse, Patient and GI Consultant.    Lamonte Ellison DO  Hospitalist Service, [address]  [facility]  Contact information available via [facility] Paging/Directory  Please see sign in/sign out for up to date coverage information  ______________________________________________________________________    Interval History     No acute events overnight. Patient is feeling much worse today. Had 3 painful bloody BMs this AM and is distraught. Doesn't feel like switching to PO steroids is a good idea.     Four point ROS completed and otherwise negative     Data reviewed today: I reviewed all medications, new labs and imaging results over the last 24 hours. I personally reviewed the EKG tracing showing NSR and the abdominal CT image(s) showing no acute pathology.    Physical Exam   Vital Signs: Temp: 98.4  F (36.9  C) Temp src: Oral BP: (!) 166/93 Pulse: 75   Resp: 18 SpO2: 94 % O2 Device: None (Room air)    Weight: 251 lbs 3.2 oz    Constitutional: AOX3  Eyes: anicteric  Cardiovascular: Regular rate and rhythm without murmurs or gallops  Pulmonary/Chest: Clear to auscultation bilaterally, with no wheezes or retractions. No respiratory distress.  GI: Soft with good bowel sounds.  Mild TTP all over. Non-distended, with no guarding, no rebound, no peritoneal signs.   Back:  No bony or CVA tenderness   Musculoskeletal:  No edema or clubbing   Skin: Skin is warm and dry. No rash noted to exposed skin areas.   Neurological: Alert and oriented to person, place, and time. Nonfocal exam  Psychiatric:  apprpopriatve    Data   Recent Labs   Lab 05/10/21  0702 05/09/21  0718 05/08/21  1739 05/08/21  1627 05/07/21  0544 05/06/21  1358 05/06/21  1356   WBC 10.8 13.6* 17.8* Canceled, Test credited 13.4*  --  28.3*   HGB 12.5 13.0 12.7 Canceled, Test credited 11.5*  --  14.0   MCV 96 96 93 Canceled, Test credited 94  --  95    308 320 Canceled, Test credited 247  --  308   INR  --   --   --   --   --   --  0.96    140  --  136 140  --  138   POTASSIUM 4.0 4.0  --  4.1 4.0  --  4.2   CHLORIDE 105 106  --  106 106   --  105   CO2 28 27  --  22 28  --  24   BUN 15 17  --  16 14  --  19   CR 0.56 0.65  --  0.71 0.63  --  0.66   ANIONGAP 6 7  --  8 6  --  9   PATRICIA 9.1 8.9  --  9.2 7.9*  --  8.3*   * 133*  --  196* 93  --  149*   ALBUMIN  --   --   --   --  2.8*  --  3.4   PROTTOTAL  --   --   --   --  5.4*  --  6.7*   BILITOTAL  --   --   --   --  0.5  --  0.2   ALKPHOS  --   --   --   --  42  --  52   ALT  --   --   --   --  63*  --  91*   AST  --   --   --   --  10  --  21   LIPASE  --   --   --   --   --  47*  --    TROPI  --   --   --   --   --   --  <0.015     No results found for this or any previous visit (from the past 24 hour(s)).  Medications     lactated ringers 100 mL/hr at 05/09/21 3767       dicyclomine  20 mg Oral TID w/meals     enoxaparin ANTICOAGULANT  40 mg Subcutaneous Q12H     gabapentin  900 mg Oral TID     heparin lock flush  5-15 mL Intracatheter Q24H     insulin aspart  1-4 Units Subcutaneous Q4H     methocarbamol  500 mg Oral 4x Daily     [Held by provider] methylPREDNISolone  32 mg Intravenous Q12H     pantoprazole  40 mg Oral QAM AC     predniSONE  40 mg Oral Daily     sodium chloride (PF)  10 mL Intracatheter Q8H     sodium chloride (PF)  10 mL Intracatheter Q8H     sodium chloride (PF)  3 mL Intracatheter Q8H     venlafaxine  225 mg Oral Daily

## 2021-05-10 NOTE — PROGRESS NOTES
"Colon & Rectal Surgery Progress Note    May 10, 2021    Subjective:   NAEO. Severe pain deep in lower abdomen. Producing flatus and one bloody BM this morning. No n/v.     Objective: BP (!) 147/89 (BP Location: Right leg)   Pulse 66   Temp 98.3  F (36.8  C) (Oral)   Resp 18   Ht 1.6 m (5' 3\")   Wt 112.2 kg (247 lb 4.8 oz)   LMP 04/30/2021 (Exact Date)   SpO2 92%   BMI 43.81 kg/m         Intake/Output Summary (Last 24 hours) at 5/10/2021 0831  Last data filed at 5/9/2021 1859  Gross per 24 hour   Intake 1680 ml   Output --   Net 1680 ml     PE:  GEN: NAD, uncomfortable  HEAD: atraumatic  ABD/GI: obese habitus, slightly distended, tender on left side but no rebound or guarding.  EXTREMITIES: warm and well perfused    Labs:  CRP 6.4 (7.7)  WBC 10.8 (13.6)  Hgb 12.5  Plt 300    Assessment:  46yoF with left-sided UC vs. Med-induced colitis. Flex sig bx results pending.    Plan:  - Continue steroids per GI, possible biologic inpatient  - low fiber diet    No acute indication for surgical intervention. CRS will follow.    Patient seen and discussed with fellow Dr. Kauffman and gen surg resident Dr. Manley.    Dutch Dejesus, MS3  [facility] [employer] [facility]    I saw and examined the patient with the medical student and agree with the findings as documented. I have edited the note to reflect my findings and plan where necessary.    Amada Manley MD (PGY-1)  Surgery    "

## 2021-05-10 NOTE — PLAN OF CARE
1900 - 0730    47 y/o F w/hx of melanoma w/nivolumab induced colitis (s/p treatment w/infliximab & vedolizumab); giardia; c.diff; seronegative RA; steroid induced hyperglycemia; CVA w/mild left-sided weakness (2004); hyperlipidemia, hypertension, depression. Recent admission 4/26 - 5/3/21 (newly diagnosed w/UC). Admitted from ED 5/6/21 w/recurrent abdominal pain, hematochezia.     Vitals: Temp: 98.3  F (36.8  C) Temp src: Oral BP: (!) 147/89 Pulse: 66   Resp: 18 SpO2: 92 % O2 Device: None (Room air)    Pain/Nausea: Denied nausea; endorsed abdominal pain.   Interventions: 0.5mg IV dilaudid x1, 650mg tylenol x1.  Diet: Low fiber.   BG orders: Q4hr checks w/s/s novolog.   LDA: RPIV w/LR@100mL/hr; R double lumen midline, heparin locked.   GI: BMx1, formed brown/black stool + bloody mucous.   : Voiding, not saving.   Skin: Pale, molly.  Neuro: A&Ox4.   Mobility: Independent.   Education: Discussed weaning from IV pain meds in preparation for discharge.  Plan: Consult w/[facility] today. Discharge today or tomorrow.

## 2021-05-10 NOTE — PROGRESS NOTES
Care Management Follow Up    Length of Stay (days): 4    Expected Discharge Date: TBD     Concerns to be Addressed: coping/stress, employment/school     Patient plan of care discussed at interdisciplinary rounds: Yes    Anticipated Discharge Disposition: Home     Anticipated Discharge Services: None  Anticipated Discharge DME: None    Patient/family educated on Medicare website which has current facility and service quality ratings: N/A  Education Provided on the Discharge Plan:    Patient/Family in Agreement with the Plan: yes    Referrals Placed by CM/SW: Internal Clinic Care Coordination  Private pay costs discussed: Not applicable    Additional Information:  Patient asked to meet with SW at bedside. SW went to her room and patient said she needed some help with paperwork from her employer. Per patient she has used up all of her FMLA and fears she may be let go. SW asked patient to email SW the paperwork needed to be completed. Patient also asked for a doctors note. SW reviewed the paperwork patient sent. SW provided patient with the paperwork she needs to fill out and SW provided the MD the paperwork they need to fill out. SW also wrote a work excuse letter and had the MD sign. All paperwork was completed by the appropraite parties. SW provided patient with all the physical copies and also emailed her copies so she could send to . Patient may be considering getting a  if there are continued problems with her work giving her time off due to medical problems. Patient thankful for the help.    GEORGIE Azar, SUZY  7A   Ph: [phone]  Pager: [phone]

## 2021-05-10 NOTE — PROGRESS NOTES
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 Recently hospitalized 4/26-5/3 with new UC diagnosis, transitioned from IV steroids to PO on 5/3 with a plan to establish care at [facility] and start vedolizumab, now back in [facility] ED on 5/6 with recurrent abdominal pain and hematochezia.    # Acute flare of ulcerative proctosigmoiditis  # H/o checkpoint inhibitor induced colitis s/p Remicade  # RA on tocilizumab & Prednisone  #Tenesmus, bloody diarrhea  Leukocytosis resolved and VSS with hypertension continued. First full day of PO steroid trial today. Per CRS no acute indication for surgery and believe DLI of limited utility d/t abdominal obesity. Possible total colectomy if surgery deemed necessary. Given her previous failure of PO steroid taper and limited medication options (no anti-TNF d/t melanoma hx) tentative plan for steroid bridge vedolizumab vs need for surgical management.     Recommendations  -Per discussion with pharmacy, inpatient vedolizumab is not an option  -Continue PO steroids today (will increase dose to 60 mg prednisolone daily), reassess response in the am for ability to discharge and begin vedolizumab outpatient versus need to restart IV steroids  -Continue to monitor stool frequency and % containing blood  -Begin trial of Mg enema BID to address tenesmus/bloody stools  -Continue Bentyl  -Continue DVT prophylaxis    GI will continue to follow. Thank you for involving us in this patient's care. Please do not hesitate to contact the GI service with any questions or concerns.     Pt care plan discussed with Dr. Davey, GI staff  physician.    Yamil Diaz CNP  [facility]  Text page          Interval History:   Continues with abdominal pain, worse during and after BM. Feels a scraping sensation 'in my belly'. 2 bloody stools overnight and one this am so far. Afebrile.          Medications:     Current Facility-Administered Medications   Medication     acetaminophen (TYLENOL) tablet 650 mg     glucose gel 15-30 g    Or     dextrose 50 % injection 25-50 mL    Or     glucagon injection 1 mg     dicyclomine (BENTYL) tablet 20 mg     enoxaparin ANTICOAGULANT (LOVENOX) injection 40 mg     gabapentin (NEURONTIN) capsule 900 mg     heparin lock flush 10 UNIT/ML injection 5 mL     heparin lock flush 10 UNIT/ML injection 5-15 mL     HYDROmorphone (PF) (DILAUDID) injection 0.5 mg     hydrOXYzine (ATARAX) tablet 25 mg     insulin aspart (NovoLOG) injection (RAPID ACTING)     lactated ringers infusion     lidocaine (LMX4) cream     lidocaine (LMX4) cream     lidocaine (LMX4) cream     lidocaine 1 % 0.1-1 mL     lidocaine 1 % 0.1-1 mL     lidocaine 1 % 0.1-1 mL     melatonin tablet 1 mg     methocarbamol (ROBAXIN) tablet 500 mg     methylPREDNISolone sodium succinate (solu-MEDROL) injection 32 mg     ondansetron (ZOFRAN-ODT) ODT tab 4 mg    Or     ondansetron (ZOFRAN) injection 4 mg     pantoprazole (PROTONIX) EC tablet 40 mg     sodium chloride (PF) 0.9% PF flush 10 mL     sodium chloride (PF) 0.9% PF flush 10 mL     sodium chloride (PF) 0.9% PF flush 10-20 mL     sodium chloride (PF) 0.9% PF flush 10-20 mL     sodium chloride (PF) 0.9% PF flush 3 mL     sodium chloride (PF) 0.9% PF flush 3 mL     vancomycin (VANCOCIN) capsule 125 mg     venlafaxine (EFFEXOR-XR) 24 hr capsule 225 mg     Facility-Administered Medications Ordered in Other Encounters   Medication     lidocaine 1 % 9 mL     sodium bicarbonate 8.4 % injection 1 mEq        Physical Exam   Blood pressure 134/87, pulse 96, temperature 97.6  F (36.4  C), temperature source Oral, resp. rate 18,  "height 1.6 m (5' 3\"), weight 113.9 kg (251 lb 3.2 oz), last menstrual period 04/30/2021, SpO2 95 %, not currently breastfeeding.  Constitutional: cooperative, pleasant, not dyspneic/diaphoretic, no acute distress  Eyes: Sclera anicteric/injected  Ears/nose/mouth/throat: Normal oropharynx without ulcers or exudate, mucus membranes moist  Neck: supple  CV: No edema  Respiratory: Unlabored breathing  Abd: Non distended, obese, +bs, no hepatosplenomegaly, generally tender, L>R, no peritoneal signs  Skin: warm, perfused, no jaundice  Neuro: AAO x 3  Psych: Normal affect  MSK: No gross deformities    Data   Current Labs  CBC  Recent Labs   Lab 05/10/21  0702 05/09/21  0718 05/08/21  1739 05/08/21  1627   WBC 10.8 13.6* 17.8* Canceled, Test credited   RBC 3.91 4.13 4.15 Canceled, Test credited   HGB 12.5 13.0 12.7 Canceled, Test credited   HCT 37.7 39.5 38.6 Canceled, Test credited   MCV 96 96 93 Canceled, Test credited   MCH 32.0 31.5 30.6 Canceled, Test credited   MCHC 33.2 32.9 32.9 Canceled, Test credited   RDW 12.9 12.8 12.8 Canceled, Test credited    308 320 Canceled, Test credited     BMP  Recent Labs   Lab 05/10/21  0702 05/09/21  0718 05/08/21  1627 05/07/21  0544 05/06/21  1356    140 136 140 138   POTASSIUM 4.0 4.0 4.1 4.0 4.2   CHLORIDE 105 106 106 106 105   CO2 28 27 22 28 24   ANIONGAP 6 7 8 6 9   * 133* 196* 93 149*   BUN 15 17 16 14 19   CR 0.56 0.65 0.71 0.63 0.66   GFRESTIMATED >90 >90 >90 >90 >90   GFRESTBLACK >90 >90 >90 >90 >90   PATRICIA 9.1 8.9 9.2 7.9* 8.3*   MAG  --   --   --   --  2.6*   PHOS  --   --   --   --  2.7      INR  Recent Labs   Lab 05/06/21  1356   INR 0.96     Liver panel  Recent Labs   Lab 05/07/21  0544 05/06/21  1356   PROTTOTAL 5.4* 6.7*   ALBUMIN 2.8* 3.4   BILITOTAL 0.5 0.2   ALKPHOS 42 52   AST 10 21   ALT 63* 91*       Imaging/procedures:  Reviewed in EMR    "

## 2021-05-11 ENCOUNTER — PATIENT OUTREACH (OUTPATIENT)
Dept: GASTROENTEROLOGY | Facility: CLINIC | Age: 46
End: 2021-05-11

## 2021-05-11 ENCOUNTER — PATIENT OUTREACH (OUTPATIENT)
Dept: CARE COORDINATION | Facility: CLINIC | Age: 46
End: 2021-05-11

## 2021-05-11 VITALS
TEMPERATURE: 98.2 F | HEART RATE: 93 BPM | RESPIRATION RATE: 18 BRPM | HEIGHT: 63 IN | SYSTOLIC BLOOD PRESSURE: 178 MMHG | WEIGHT: 251.2 LBS | OXYGEN SATURATION: 95 % | BODY MASS INDEX: 44.51 KG/M2 | DIASTOLIC BLOOD PRESSURE: 97 MMHG

## 2021-05-11 LAB
ANION GAP SERPL CALCULATED.3IONS-SCNC: 7 MMOL/L (ref 3–14)
BUN SERPL-MCNC: 16 MG/DL (ref 7–30)
CALCIUM SERPL-MCNC: 8.3 MG/DL (ref 8.5–10.1)
CHLORIDE SERPL-SCNC: 106 MMOL/L (ref 94–109)
CO2 SERPL-SCNC: 28 MMOL/L (ref 20–32)
CREAT SERPL-MCNC: 0.7 MG/DL (ref 0.52–1.04)
CRP SERPL-MCNC: 5.4 MG/L (ref 0–8)
ERYTHROCYTE [DISTWIDTH] IN BLOOD BY AUTOMATED COUNT: 13.1 % (ref 10–15)
ERYTHROCYTE [DISTWIDTH] IN BLOOD BY AUTOMATED COUNT: NORMAL % (ref 10–15)
GFR SERPL CREATININE-BSD FRML MDRD: >90 ML/MIN/{1.73_M2}
GLUCOSE BLDC GLUCOMTR-MCNC: 160 MG/DL (ref 70–99)
GLUCOSE BLDC GLUCOMTR-MCNC: 188 MG/DL (ref 70–99)
GLUCOSE SERPL-MCNC: 128 MG/DL (ref 70–99)
HBA1C MFR BLD: 6.5 % (ref 0–5.6)
HBA1C MFR BLD: NORMAL % (ref 0–5.6)
HCT VFR BLD AUTO: 37.7 % (ref 35–47)
HCT VFR BLD AUTO: NORMAL % (ref 35–47)
HGB BLD-MCNC: 12.4 G/DL (ref 11.7–15.7)
HGB BLD-MCNC: NORMAL G/DL (ref 11.7–15.7)
MCH RBC QN AUTO: 31.5 PG (ref 26.5–33)
MCH RBC QN AUTO: NORMAL PG (ref 26.5–33)
MCHC RBC AUTO-ENTMCNC: 32.9 G/DL (ref 31.5–36.5)
MCHC RBC AUTO-ENTMCNC: NORMAL G/DL (ref 31.5–36.5)
MCV RBC AUTO: 96 FL (ref 78–100)
MCV RBC AUTO: NORMAL FL (ref 78–100)
PLATELET # BLD AUTO: 298 10E9/L (ref 150–450)
PLATELET # BLD AUTO: NORMAL 10E9/L (ref 150–450)
POTASSIUM SERPL-SCNC: 4 MMOL/L (ref 3.4–5.3)
RBC # BLD AUTO: 3.94 10E12/L (ref 3.8–5.2)
RBC # BLD AUTO: NORMAL 10E12/L (ref 3.8–5.2)
SODIUM SERPL-SCNC: 140 MMOL/L (ref 133–144)
WBC # BLD AUTO: 13.9 10E9/L (ref 4–11)
WBC # BLD AUTO: NORMAL 10E9/L (ref 4–11)

## 2021-05-11 PROCEDURE — 99239 HOSP IP/OBS DSCHRG MGMT >30: CPT | Performed by: INTERNAL MEDICINE

## 2021-05-11 PROCEDURE — 250N000013 HC RX MED GY IP 250 OP 250 PS 637: Performed by: INTERNAL MEDICINE

## 2021-05-11 PROCEDURE — 36415 COLL VENOUS BLD VENIPUNCTURE: CPT | Performed by: INTERNAL MEDICINE

## 2021-05-11 PROCEDURE — 36415 COLL VENOUS BLD VENIPUNCTURE: CPT | Performed by: STUDENT IN AN ORGANIZED HEALTH CARE EDUCATION/TRAINING PROGRAM

## 2021-05-11 PROCEDURE — 250N000013 HC RX MED GY IP 250 OP 250 PS 637: Performed by: NURSE PRACTITIONER

## 2021-05-11 PROCEDURE — 86140 C-REACTIVE PROTEIN: CPT | Performed by: STUDENT IN AN ORGANIZED HEALTH CARE EDUCATION/TRAINING PROGRAM

## 2021-05-11 PROCEDURE — 999N001017 HC STATISTIC GLUCOSE BY METER IP

## 2021-05-11 PROCEDURE — 83036 HEMOGLOBIN GLYCOSYLATED A1C: CPT | Performed by: INTERNAL MEDICINE

## 2021-05-11 PROCEDURE — 99232 SBSQ HOSP IP/OBS MODERATE 35: CPT | Performed by: NURSE PRACTITIONER

## 2021-05-11 PROCEDURE — 250N000012 HC RX MED GY IP 250 OP 636 PS 637: Performed by: NURSE PRACTITIONER

## 2021-05-11 PROCEDURE — 250N000011 HC RX IP 250 OP 636: Performed by: STUDENT IN AN ORGANIZED HEALTH CARE EDUCATION/TRAINING PROGRAM

## 2021-05-11 PROCEDURE — 250N000013 HC RX MED GY IP 250 OP 250 PS 637: Performed by: STUDENT IN AN ORGANIZED HEALTH CARE EDUCATION/TRAINING PROGRAM

## 2021-05-11 PROCEDURE — 250N000011 HC RX IP 250 OP 636: Performed by: PHYSICIAN ASSISTANT

## 2021-05-11 PROCEDURE — 85027 COMPLETE CBC AUTOMATED: CPT | Performed by: INTERNAL MEDICINE

## 2021-05-11 PROCEDURE — 80048 BASIC METABOLIC PNL TOTAL CA: CPT | Performed by: STUDENT IN AN ORGANIZED HEALTH CARE EDUCATION/TRAINING PROGRAM

## 2021-05-11 RX ORDER — HYDROCORTISONE 100 MG/60ML
100 SUSPENSION RECTAL 2 TIMES DAILY
Qty: 28 ENEMA | Refills: 0 | Status: SHIPPED | OUTPATIENT
Start: 2021-05-11 | End: 2021-05-25

## 2021-05-11 RX ORDER — UBIQUINOL 100 MG
1 CAPSULE ORAL 3 TIMES DAILY
Qty: 100 EACH | Refills: 0 | Status: SHIPPED | OUTPATIENT
Start: 2021-05-11 | End: 2022-12-07

## 2021-05-11 RX ORDER — NICOTINE POLACRILEX 4 MG
15-30 LOZENGE BUCCAL
Status: DISCONTINUED | OUTPATIENT
Start: 2021-05-11 | End: 2021-05-11 | Stop reason: HOSPADM

## 2021-05-11 RX ORDER — PREDNISOLONE SODIUM PHOSPHATE 15 MG/5ML
30 SOLUTION ORAL 2 TIMES DAILY
Status: DISCONTINUED | OUTPATIENT
Start: 2021-05-11 | End: 2021-05-11 | Stop reason: HOSPADM

## 2021-05-11 RX ORDER — OXYCODONE HYDROCHLORIDE 5 MG/1
5 TABLET ORAL EVERY 4 HOURS PRN
Qty: 20 TABLET | Refills: 0 | Status: ON HOLD | OUTPATIENT
Start: 2021-05-11 | End: 2021-06-09

## 2021-05-11 RX ORDER — PREDNISONE 10 MG/1
TABLET ORAL
Qty: 126 TABLET | Refills: 0 | Status: ON HOLD | OUTPATIENT
Start: 2021-05-11 | End: 2021-06-09

## 2021-05-11 RX ORDER — INSULIN LISPRO 100 [IU]/ML
1-7 INJECTION, SOLUTION INTRAVENOUS; SUBCUTANEOUS
Qty: 3 ML | Refills: 2 | Status: SHIPPED | OUTPATIENT
Start: 2021-05-11 | End: 2022-02-07

## 2021-05-11 RX ORDER — CONTAINER,EMPTY
1 EACH MISCELLANEOUS ONCE
Qty: 1 EACH | Refills: 0 | Status: SHIPPED | OUTPATIENT
Start: 2021-05-11 | End: 2021-05-11

## 2021-05-11 RX ORDER — DEXTROSE MONOHYDRATE 25 G/50ML
25-50 INJECTION, SOLUTION INTRAVENOUS
Status: DISCONTINUED | OUTPATIENT
Start: 2021-05-11 | End: 2021-05-11 | Stop reason: HOSPADM

## 2021-05-11 RX ORDER — AMLODIPINE BESYLATE 5 MG/1
5 TABLET ORAL DAILY
Status: DISCONTINUED | OUTPATIENT
Start: 2021-05-11 | End: 2021-05-11 | Stop reason: HOSPADM

## 2021-05-11 RX ORDER — PREDNISOLONE 5 MG/1
30 TABLET ORAL 2 TIMES DAILY WITH MEALS
Status: DISCONTINUED | OUTPATIENT
Start: 2021-05-11 | End: 2021-05-11

## 2021-05-11 RX ADMIN — DICYCLOMINE HYDROCHLORIDE 20 MG: 20 TABLET ORAL at 11:43

## 2021-05-11 RX ADMIN — ACETAMINOPHEN 975 MG: 325 TABLET, FILM COATED ORAL at 07:48

## 2021-05-11 RX ADMIN — VENLAFAXINE HYDROCHLORIDE 225 MG: 75 CAPSULE, EXTENDED RELEASE ORAL at 07:49

## 2021-05-11 RX ADMIN — HYDROCORTISONE 1 ENEMA: 100 ENEMA RECTAL at 09:07

## 2021-05-11 RX ADMIN — DICYCLOMINE HYDROCHLORIDE 20 MG: 20 TABLET ORAL at 07:49

## 2021-05-11 RX ADMIN — GABAPENTIN 900 MG: 300 CAPSULE ORAL at 07:49

## 2021-05-11 RX ADMIN — GABAPENTIN 900 MG: 300 CAPSULE ORAL at 14:43

## 2021-05-11 RX ADMIN — AMLODIPINE BESYLATE 5 MG: 5 TABLET ORAL at 07:49

## 2021-05-11 RX ADMIN — PREDNISOLONE SODIUM PHOSPHATE 30 MG: 15 SOLUTION ORAL at 10:40

## 2021-05-11 RX ADMIN — INSULIN ASPART 1 UNITS: 100 INJECTION, SOLUTION INTRAVENOUS; SUBCUTANEOUS at 01:10

## 2021-05-11 RX ADMIN — ENOXAPARIN SODIUM 40 MG: 100 INJECTION SUBCUTANEOUS at 01:09

## 2021-05-11 RX ADMIN — METHOCARBAMOL 500 MG: 500 TABLET, FILM COATED ORAL at 07:49

## 2021-05-11 RX ADMIN — METHOCARBAMOL 500 MG: 500 TABLET, FILM COATED ORAL at 11:43

## 2021-05-11 RX ADMIN — PANTOPRAZOLE SODIUM 40 MG: 40 TABLET, DELAYED RELEASE ORAL at 07:49

## 2021-05-11 RX ADMIN — ENOXAPARIN SODIUM 40 MG: 100 INJECTION SUBCUTANEOUS at 11:43

## 2021-05-11 RX ADMIN — OXYCODONE HYDROCHLORIDE 5 MG: 5 TABLET ORAL at 07:48

## 2021-05-11 RX ADMIN — HYDROMORPHONE HYDROCHLORIDE 0.5 MG: 1 INJECTION, SOLUTION INTRAMUSCULAR; INTRAVENOUS; SUBCUTANEOUS at 10:49

## 2021-05-11 ASSESSMENT — ACTIVITIES OF DAILY LIVING (ADL)
ADLS_ACUITY_SCORE: 13

## 2021-05-11 NOTE — DISCHARGE SUMMARY
[facility]  Hospitalist Discharge Summary      Date of Admission:  5/6/2021  Date of Discharge:  5/11/2021  4:24 PM  Discharging Provider: Gita Duran MD  Discharge Team: Hospitalist Service, Gold 11    Discharge Diagnoses      Acute flare of ulcerative proctocolitis  Hx of Nivolumab induced colitis s/p Remicaide  Leukocytosis  Lactic Acidosis, resolved  Hematochezia  Seronegative RA  Steroid induced hyperglycemia  HTN  HLD  Anxiety  Hx of recurrent C. Diff  Non-severe malnutrition    Follow-ups Needed After Discharge   Follow-up Appointments     Adult [facility] Follow-up and recommended labs and tests      Follow up with primary care provider, Anna Naidu, within 7 days for   hospital follow- up.  No follow up labs or test are needed.      Follow-up with the GI doctors tomorrow morning 5/12 for a virtual visit at   8:20. Your infusion appointment is at 1:30pm.    Appointments on [place] and/or [facility] (with [facility] or [facility]   provider or service). Call [phone] if you haven't heard regarding   these appointments within 7 days of discharge.           Unresulted Labs Ordered in the Past 30 Days of this Admission     Date and Time Order Name Status Description    5/6/2021 1552 Blood culture Preliminary     5/6/2021 1552 Blood culture Preliminary       These results will be followed up by PCP, Dr. Duran    Discharge Disposition   Discharged to home  Condition at discharge: Stable    Hospital Course   Doretha Fernandez is a 46 year old female admitted on 5/6/2021. She has PMH of melanoma w/ nivolumab induced colitis s/p treatment with infliximab and vedolizumab, hx of giardia and C.diff infection (most recently 4/7/21), seronegative RA on prednisone and tocilizumab, Steroid induced hyperglycemia, CVA w/ mild left-sides weakness (2004), HLD, HTN, and Depression  w/ recent admission 4/26-5/3 with new UC diagnosis who presents to the ED for evaluation of  recurrent abdominal pain and hematochezia c/w UC flare, s/p flex sig which showed Gunter Score 3 UC for which she was started on IV steroids and transitioned to oral steroids by the time of discharge.      Acute flare of ulcerative proctocolitis  Hx of Nivolumab induced colitis s/p Remicaide  Leukocytosis  Lactic Acidosis, resolved  Hematochezia  Patient had a new diagnosis of UC during hospitalization from 4/26-5/3 and was sent home on oral steroids, but returned to the emergency room on 5/6 with persistent symptoms of hematochezia, tenesmus, and pain.  On admission, CT AP w/o signs of abscess or perforation.   Did have elevated inflammatory markers as well.  GI was consulted at the time of admission; she underwent flex sig on 5/7 which confirmed Gunter Score 3 UC.   She was started on IV solumedrol and transitioned to oral steroid therapy and did well with this, at a higher dose than she had previously been on (60 mg).  While admitted, colorectal surgery also evaluated the patient and did not feel a diverting loop ileostomy was indicated.  Inpatient GI team also spoke to her patient's gastroenterologist at the [facility] regarding her care.  It was determined that should she fail transition to oral steroids, we would have to pursue a total colectomy.  However, with transition to oral steroids, patient did well and had improved abdominal pain and bloody diarrhea on the day of discharge.  She was tolerating a regular diet as well.  After discussion with GI, we elected to discharge patient home with close GI follow-up scheduled for tomorrow, 5/12, to ensure patient symptomatic improvement and to initiate therapy with Vedolizumab.     Patient will be discharged with a prednisone taper as follows - of note, she was provided with medications through the transition to 30 mg daily in case her dose needs further adjustment:  60mg daily for 1 weeks, then 50/40/30/25/20/15/10/5mg daily for 1 week each, then 5mg every other day  for 1 week until off.    Additionally, she was prescribed twice daily hydrocortisone suppositories to use for the next 2 weeks, as well as Bentyl for IBS type symptoms.     Steroid induced hyperglycemia  A1c 6.1 (4/5/21). BG stable on admission.  Patient was on Metformin prior to admission.  She reports a prior history of needing to check her blood sugars but has never been on insulin.  While she was admitted here night on IV steroids, her blood sugars increased into the 200s to 300s.  She did require sliding scale insulin to correct for mealtime sugars.  Given anticipate her prednisone taper will be prolonged, we elected to continue her Metformin on discharge as well as start sliding scale insulin with meals.  Patient was sent on a correction factor of 1 unit of insulin for every 50 g/dL above 150 g/dL of glucose.  Patient was instructed on how to check her blood sugars, how to administer insulin, and we will have home nursing checking on patient tomorrow to ensure she has proper insulin technique.  She will benefit from ongoing diabetic education through her PCPs clinic, though the hope is that when she is off steroids, she will no longer need insulin therapy moving forward.    Anxiety  Patient has significant concerns related to psychosocial stressors as well as familial stressors during this admission.  Her prior to admission Effexor was continued and she was also started on hydroxyzine for as needed for anxiety.  Social work was consulted and assisted patient with obtaining resources for home.  Patient very eager to discharge back home on the day of discharge.    Consultations This Hospital Stay   VASCULAR ACCESS CARE ADULT IP CONSULT  VASCULAR ACCESS ADULT IP CONSULT  COLORECTAL SURGERY ADULT IP CONSULT  CARE MANAGEMENT / SOCIAL WORK IP CONSULT  VASCULAR ACCESS CARE ADULT IP CONSULT  DIABETES EDUCATION IP CONSULT    Code Status   Full Code    Time Spent on this Encounter   Gita WILLIAM MD, personally  saw the patient today and spent greater than 30 minutes discharging this patient.       Gita Duran MD  [facility] [address]  [address] [address]  Phone: [phone]  ______________________________________________________________________    Physical Exam   Vital Signs: Temp: 98.2  F (36.8  C) Temp src: Oral BP: (!) 178/97 Pulse: 93   Resp: 18 SpO2: 95 % O2 Device: None (Room air)    Weight: 251 lbs 3.2 oz  General Appearance: Pleasant, conversant, no distress.  Eager to go home.  Respiratory: Breathing comfortably on room air.  Lungs are clear to auscultation bilaterally.  Cardiovascular: Regular rate and rhythm, no murmurs rubs or gallops.  GI: Soft, with mild tenderness to palpation but no rebound or guarding.  Skin: Warm dry, no skin rash, no jaundice  Other: No pitting edema present in lower extremities.       Primary Care Physician   Anna Naidu    Discharge Orders      Care Coordination Referral      Home care nursing referral      Reason for your hospital stay    You were admitted with a flare of ulcerative colitis. You did well with transition to a higher dose of Prednisolone. We will send you home with close outpatient follow-up with the GI doctors. You will continue Metformin and use a sliding scale insulin regimen for blood sugar correction at home.    INSULIN SLIDING SCALE DOSING    Do Not give Correction Insulin if Pre-Meal BG less than 140.   For Pre-Meal  - 189 give 1 unit.   For Pre-Meal  - 239 give 2 units.   For Pre-Meal  - 289 give 3 units.   For Pre-Meal  - 339 give 4 units.   For Pre-Meal - 399 give 5 units.   For Pre-Meal -449 give 6 units  For Pre-Meal BG greater than or equal to 450 give 7 units.   To be given with prandial insulin, and based on pre-meal blood glucose.    Notify provider if glucose greater than or equal to 350 mg/dL after administration of correction dose.     Adult [email or web address]  Follow-up and recommended labs and tests    Follow up with primary care provider, Anna Naidu, within 7 days for hospital follow- up.  No follow up labs or test are needed.      Follow-up with the GI doctors tomorrow morning 5/12 for a virtual visit at 8:20. Your infusion appointment is at 1:30pm.    Appointments on [place] and/or [facility] (with [facility] or [facility] provider or service). Call [phone] if you haven't heard regarding these appointments within 7 days of discharge.     Activity    Your activity upon discharge: activity as tolerated     MD face to face encounter    Documentation of Face to Face and Certification for Home Health Services    I certify that patient: Doretha Fernandez is under my care and that I, or a nurse practitioner or physician's assistant working with me, had a face-to-face encounter that meets the physician face-to-face encounter requirements with this patient on: 5/11/2021.    This encounter with the patient was in whole, or in part, for the following medical condition, which is the primary reason for home health care with medical history significant for melanoma,  h/o nivolumab induced colitis treated with Remicade and Entyvio now in remission, h/o giardia infection, h/o C.diff infection 8 years ago, seronegative RA on prednisone & tocilizumab, recently strep throat treated with penicillin c/b C.diff and started PO Vancomycin 4/7 without improvement. Recently hospitalized 4/26-5/3 with new UC diagnosis, transitioned from IV steroids to PO on 5/3 with a plan to establish care at [facility] and start vedolizumab, now back in [facility] ED on 5/6 with recurrent abdominal pain and hematochezia.    I certify that, based on my findings, the following services are medically necessary home health services: Nursing.    My clinical findings support the need for the above services because: Nurse is needed: To assess Medication management, sliding scale insulin education after changes in medications  or other medical regimen..    Further, I certify that my clinical findings support that this patient is homebound (i.e. absences from home require considerable and taxing effort and are for medical reasons or [employer] services or infrequently or of short duration when for other reasons) because: Requires assistance of another person or specialized equipment to access medical services because patient: Requires supervision of another for safe transfer...    Based on the above findings. I certify that this patient is confined to the home and needs intermittent skilled nursing care, physical therapy and/or speech therapy.  The patient is under my care, and I have initiated the establishment of the plan of care.  This patient will be followed by a physician who will periodically review the plan of care.  Physician/Provider to provide follow up care: Anna Naidu    Attending hospital physician (the Medicare certified [place] provider): Gita Duran MD  Physician Signature: See electronic signature associated with these discharge orders.  Date: 5/11/2021     Diet    Follow this diet upon discharge: Orders Placed This Encounter      Snacks/Supplements Adult: Gelatein Plus; Between Meals     Regular adult diet       Significant Results and Procedures   Results for orders placed or performed during the hospital encounter of 05/06/21   CT Abdomen Pelvis w Contrast    Narrative    CT CHEST PULMONARY EMBOLISM W CONTRAST, CT ABDOMEN PELVIS W CONTRAST  5/6/2021 4:51 PM    History: Chest pain, tachycardia, evaluate for pulmonary embolism,  inflammatory bowel disease    Comparison: Chest radiograph 4/13/2021, abdomen and pelvis CT  4/26/2021, 04/13/2021    Technique: Helical CT acquisition from the lung apices to the pubic  symphysis was obtained with intravenous contrast, per pulmonary  angiogram protocol. Images of the abdomen and pelvis were obtained  during portal venous phase. Axial, coronal, and  sagittal  reconstructions were obtained and reviewed.    Contrast: iopamidol (ISOVUE-370) solution 135 mL    Findings:     CHEST:   Contrast bolus is adequate. No evidence for pulmonary embolism.    Lungs: No pneumothorax, pleural effusion, or suspicious pulmonary  nodule. Bibasilar atelectasis.  Airways: Central tracheobronchial tree is clear.  Vessels: Main pulmonary artery is borderline enlarged measuring up to  3.0 cm. Thoracic aorta is normal in caliber.  Heart: Heart size is normal without pericardial effusion.  Lymph nodes: No suspicious mediastinal or hilar lymphadenopathy.  Thyroid: Within normal limits.    ABDOMEN PELVIS:    Liver: Normal parenchymal attenuation without focal mass.  Biliary system: Gallbladder is within normal limits. No intrahepatic  or extrahepatic biliary ductal dilatation.  Pancreas: Diffuse fatty atrophy of the pancreas.  Stomach: Within normal limits.  Spleen: Within normal limits.  Adrenal glands: Within normal limits.  Kidneys: No focal mass, hydronephrosis, or stone.  Bladder: Within normal limits.  Reproductive organs: Within normal limits.  Colon: Few scattered colonic diverticula. No evidence of  diverticulitis. Mild to moderate colonic stool.  Appendix: Within normal limits.  Small Bowel: Within normal limits.  Lymph nodes: No intra-abdominal or pelvic lymphadenopathy.  Vasculature: Within normal limits.    Soft tissues: No suspicious soft tissue mass or fluid collection.  Small fat-containing umbilical hernia.  Bones: No suspicious osseous lesion. Mild degenerative changes in the  spine.      Impression    IMPRESSION:   1. No pulmonary embolism.   2. No acute findings in the chest, abdomen, or pelvis.    I have personally reviewed the examination and initial interpretation  and I agree with the findings.    JOVI VEGA MD   CT Chest Pulmonary Embolism w Contrast    Narrative    CT CHEST PULMONARY EMBOLISM W CONTRAST, CT ABDOMEN PELVIS W CONTRAST  5/6/2021 4:51  PM    History: Chest pain, tachycardia, evaluate for pulmonary embolism,  inflammatory bowel disease    Comparison: Chest radiograph 4/13/2021, abdomen and pelvis CT  4/26/2021, 04/13/2021    Technique: Helical CT acquisition from the lung apices to the pubic  symphysis was obtained with intravenous contrast, per pulmonary  angiogram protocol. Images of the abdomen and pelvis were obtained  during portal venous phase. Axial, coronal, and sagittal  reconstructions were obtained and reviewed.    Contrast: iopamidol (ISOVUE-370) solution 135 mL    Findings:     CHEST:   Contrast bolus is adequate. No evidence for pulmonary embolism.    Lungs: No pneumothorax, pleural effusion, or suspicious pulmonary  nodule. Bibasilar atelectasis.  Airways: Central tracheobronchial tree is clear.  Vessels: Main pulmonary artery is borderline enlarged measuring up to  3.0 cm. Thoracic aorta is normal in caliber.  Heart: Heart size is normal without pericardial effusion.  Lymph nodes: No suspicious mediastinal or hilar lymphadenopathy.  Thyroid: Within normal limits.    ABDOMEN PELVIS:    Liver: Normal parenchymal attenuation without focal mass.  Biliary system: Gallbladder is within normal limits. No intrahepatic  or extrahepatic biliary ductal dilatation.  Pancreas: Diffuse fatty atrophy of the pancreas.  Stomach: Within normal limits.  Spleen: Within normal limits.  Adrenal glands: Within normal limits.  Kidneys: No focal mass, hydronephrosis, or stone.  Bladder: Within normal limits.  Reproductive organs: Within normal limits.  Colon: Few scattered colonic diverticula. No evidence of  diverticulitis. Mild to moderate colonic stool.  Appendix: Within normal limits.  Small Bowel: Within normal limits.  Lymph nodes: No intra-abdominal or pelvic lymphadenopathy.  Vasculature: Within normal limits.    Soft tissues: No suspicious soft tissue mass or fluid collection.  Small fat-containing umbilical hernia.  Bones: No suspicious osseous  lesion. Mild degenerative changes in the  spine.      Impression    IMPRESSION:   1. No pulmonary embolism.   2. No acute findings in the chest, abdomen, or pelvis.    I have personally reviewed the examination and initial interpretation  and I agree with the findings.    JOVI VEGA MD       Discharge Medications   Discharge Medication List as of 5/11/2021  3:24 PM      START taking these medications    Details   Alcohol Swabs (ALCOHOL PREP) 70 % PADS 1 applicator 3 times daily, Disp-100 each, R-0, E-Prescribe      blood glucose (NO BRAND SPECIFIED) lancets standard Use to test blood sugar 3 times daily or as directed.Disp-1 each, [id number]      !! blood glucose (NO BRAND SPECIFIED) test strip Use to test blood sugar 3 times daily or as directed., Disp-200 strip, R-0, E-Prescribe      blood glucose monitoring (NO BRAND SPECIFIED) meter device kit Use to test blood sugar 3 times daily or as directed.Disp-1 kit, [id number]      hydrocortisone (CORTENEMA) 100 MG/60ML enema Place 1 enema rectally 2 times daily for 14 days, Disp-28 enema, R-0, E-Prescribe      insulin lispro (HUMALOG KWIKPEN) 100 UNIT/ML (1 unit dial) KWIKPEN Inject 1-7 Units Subcutaneous 4 times daily (with meals and nightly) Per the insulin sliding scale provided in the discharge summary., Disp-3 mL, R-2, E-Prescribe      oxyCODONE (ROXICODONE) 5 MG tablet Take 1 tablet (5 mg) by mouth every 4 hours as needed for moderate to severe pain, Disp-20 tablet, R-0, E-Prescribe       !! - Potential duplicate medications found. Please discuss with provider.      CONTINUE these medications which have CHANGED    Details   predniSONE (DELTASONE) 10 MG tablet Take 6 tablets daily for 7 days, then 5 tablets daily for 7 days, then 4 tablets daily for 7 days, then 3 tablets daily for 7 days. Call the GI doctors for refill after that point., Disp-126 tablet, R-0, E-Prescribe         CONTINUE these medications which have NOT CHANGED    Details   !!  ACCU-CHEK GUIDE test strip USE TO TEST BLOOD SUGAR TWO TIMES A DAY OR AS DIRECTED, Disp-200 strip, R-0, E-Prescribe      acetaminophen (TYLENOL) 500 MG tablet Take 1,000 mg by mouth every 8 hours as needed for mild pain, Historical      amLODIPine (NORVASC) 5 MG tablet Take 1 tablet (5 mg) by mouth daily, Disp-30 tablet, R-0, Local Print      blood glucose monitoring (ACCU-CHEK FASTCLIX) lancets USE TWO TIMES A DAY OR AS DIRECTED, Disp-102 each, R-9, E-Prescribe      Calcium Carb-Cholecalciferol 600-800 MG-UNIT TABS Take 800 mg by mouth daily before breakfast, Historical      calcium carbonate (TUMS) 500 MG chewable tablet Take 1 tablet (500 mg) by mouth 3 times daily as needed for heartburn, Disp-30 tablet, R-0, Local Print      dicyclomine (BENTYL) 20 MG tablet Take 1 tablet (20 mg) by mouth 3 times daily (with meals), Disp-30 tablet, R-0, Local Print      famotidine (PEPCID) 10 MG tablet Take 1 tablet (10 mg) by mouth 2 times daily, Disp-30 tablet, R-0, Local Print      ferrous fumarate 65 mg, [place]. FE,-Vitamin C 125 mg (VITRON-C)  MG TABS tablet Take 1 tablet by mouth daily, Disp-30 tablet, R-1, E-PrescribeDUE TO BE SEEN      gabapentin (NEURONTIN) 300 MG capsule TAKE THREE CAPSULES BY MOUTH FOUR TIMES A DAY, Disp-360 capsule, R-0, E-Prescribe      hydrOXYzine (ATARAX) 25 MG tablet TAKE ONE TABLET BY MOUTH AT BEDTIME, Disp-90 tablet, R-1, E-Prescribe      metFORMIN (GLUCOPHAGE-XR) 500 MG 24 hr tablet Take 1,000 mg by mouth Daily with breakfast., Historical      ondansetron (ZOFRAN) 8 MG tablet Take 8 mg by mouth every 8 hours as needed for nausea (Pt has not taken in past year 2/10/20) , Historical      pantoprazole (PROTONIX) 40 MG EC tablet Take 1 tablet (40 mg) by mouth every morning (before breakfast), Disp-30 tablet, R-0, Local Print      rosuvastatin (CRESTOR) 10 MG tablet Take 1 tablet (10 mg) by mouth daily, Disp-90 tablet, R-3, E-Prescribe      sulfamethoxazole-trimethoprim (BACTRIM DS) 800-160 MG  tablet Take 1 tablet by mouth daily, Disp-45 tablet, R-0, Local PrintContinue until off steroids      vancomycin (VANCOCIN) 125 MG capsule Take 1 capsule (125 mg) by mouth 4 times daily for 3 days, THEN 1 capsule (125 mg) 2 times daily for 7 days, THEN 1 capsule (125 mg) daily for 7 days, THEN 1 capsule (125 mg) every other day for 14 days., Disp-40 capsule, R-0, E-Prescribe      venlafaxine (EFFEXOR-ER) 225 MG 24 hr tablet TAKE ONE TABLET BY MOUTH ONCE DAILY, Disp-90 tablet, R-1, E-Prescribe      VITAMIN D3 25 MCG (1000 UT) tablet TAKE THREE TABLETS BY MOUTH ONCE DAILY, Disp-300 tablet, R-1, E-Prescribe       !! - Potential duplicate medications found. Please discuss with provider.      STOP taking these medications       hydrocortisone-pramoxine (PROCTOFOAM-HC) rectal foam Comments:   Reason for Stopping:             Allergies   Allergies   Allergen Reactions     Bee Venom Swelling     Azithromycin Diarrhea     Erythromycin      Other reaction(s): GI intolerance, Vomiting     Fentanyl Other (See Comments)     sweating  sweating     Prochlorperazine Fatigue     Other reaction(s): Other (see comments)  Fatigue     Buspirone      Other reaction(s): GI intolerance  vomiting     Erythrocin Nausea and Vomiting     Zithromax [Azithromycin Dihydrate] Diarrhea     Enbrel [Etanercept] Hives and Rash

## 2021-05-11 NOTE — PROGRESS NOTES
Care Management Discharge Note    Discharge Date: 05/11/21       Discharge Disposition: Home    Discharge Services: None    Discharge DME: None    Discharge Transportation: family or friend will provide     Patient/family educated on Medicare website which has current facility and service quality ratings: (N/A)    Education Provided on the Discharge Plan:  Yes  Persons Notified of Discharge Plans: Patient  Patient/Family in Agreement with the Plan: yes    Handoff Referral Completed: Yes    Additional Information:  Pt status reviewed/discussed with Dr. Duran.  Pt cleared for discharge today.  Pt is scheduled for outpatient IV vedolizumab tomorrow at [facility].  Pt will discharge on sliding scale subcutaneous insulin which is new for the patient.   Discussed option of home care RN and scheduling clinic appointment for post hospital f/u.      Reviewed anticipated plan with Bianca MARR.    Met with pt.  Introduced RNCC role. Pt notes that she met with a diabetic educator roughly a year ago.  Pt has a glucometer, lancets and test strips.  Patient has not had to give herself subcutaneous insulin but has administered other medications subcutaneous and notes no concerns with learning to administer subcutaneous insulin.  Pt would be open to having home health RN visit.  Pt confirmed her PCP is through [facility].      Email referral made to [facility].  Home care confirmed that they are able to see the patient tomorrow morning.    Discharge home care orders updated.    Reviewed with Dr. Duran.    Email referral made to [facility].    1345: Reviewed home care plan with patient and Bianca MARR.       Diann Santiago RN BSN, PHN, ACM-RN  7A RN Care Coordinator  Phone: [phone]  Pager [phone]    5/11/2021 1:48 PM

## 2021-05-11 NOTE — PLAN OF CARE
[id number]  VS: Blood pressures high 150's-160's/80's on right leg.  On room air.    BG: every 4 hours,  in the 180's,  sliding scale given.    Labs: pending this am.    Pain/Nausea: when asked if she had pain she stated yes, but then fell asleep immediately.  No complaints of nausea.  Appeared to sleep well.    Diet: Low fiber diet.    LDA: midline infusing LR @ 100cc/hr.  Pt states PIV was painful on evening shift.    GI: pt reports 2 BMs during the night.  : Voiding, not saving.    Skin: intact  Mobility: Up ad miriam in the room.    Plan: Possible discharge today?

## 2021-05-11 NOTE — PHARMACY-RX INSURANCE COVERAGE
Consulted by Shannan Woods with Inpatient Diabetes Management Team to run test claims for covered Blood Glucose Meter/Supplies & Insulins.    Patient has pharmacy benefits through [employer]. Per insurance, the following products are covered and preferred under the plan:      Accu-chek meter/supplies    Lantus    Toujeo    Humalog      Please feel free to contact me with any other test claims, prior authorizations, or insurance questions regarding outpatient medications.     Thanks!      Sara Marie, [facility] Discharge Pharmacy Liaison  Pronouns: She/Her/Hers  (covering for Doretha Tyrone, [facility]-Discharge/[facility] Discharge Pharmacy Liaison)    [facility]  [address] [place] [address]  [address] [place] [address] [address] [address]   [email or web address]  [email or web address]   Phone: [phone]  Pager: [phone]  Fax: [phone]

## 2021-05-11 NOTE — PROGRESS NOTES
CLINICAL NUTRITION SERVICES - BRIEF NOTE (Consult for low residue diet education)  Reason for Assessment:  Nutrition education regarding low fiber/low residue diet along with CHO in foods/CHO moderation at meals.   Diet History:  Patient reports no history of following a low fiber/low residue diet and/or CHO in foods.    Nutrition Diagnosis:  Food- and nutrition-related knowledge deficit r/t no previous knowledge of low fiber diet AEB patient report and request for information.   Interventions:  Provided instruction on low fiber/low residue diet.  Recommended avoiding gristle/rinds on meats (steak, joel), whole grain products, brown rice, popcorn, raw vegetables, raw fruits (except banana, stone fruits without the peel, melons with no seeds), corn, peas, nuts/seeds, juice with pulp.   Discussed food sources of CHO, keeping CHO portion sizes small, recommending sticking to ~4 servings per meal  Provided handouts: AND Low Fiber Therapy and CHO counting handout  Encouraged ongoing use of Gelatein Plus (which patient enjoys - pineapple flavor) and consideration of a low CHO/high protein supplement drink (Ensure Max Protein).  Sending some of these for patient to try.   Goals:   Patient to name 3 foods to avoid on a low fiber diet and name 3 foods containing CHO  Follow-up:    Patient to ask any further nutrition-related questions before discharge.  In addition, pt may request outpatient RD appointment.    Cathy Ibarra MS, RD, LD  Pager [phone]

## 2021-05-11 NOTE — PLAN OF CARE
Order for discharge - AVS (follow up apts, med list) reviewed with patient. She verbalized understanding. Midline IV removed per order. RN reviewed sliding scale insulin administration with patient. RN printed off diabetic education handouts - hyper/hypoglycemia, using an injection pen + step by step, how to check your blood sugar, understanding carbohydrates, goals for your diabetes care.

## 2021-05-11 NOTE — PROGRESS NOTES
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 Recently hospitalized 4/26-5/3 with new UC diagnosis, transitioned from IV steroids to PO on 5/3 with a plan to establish care at [facility] and start vedolizumab, now back in [facility] ED on 5/6 with recurrent abdominal pain and hematochezia.    # Acute flare of ulcerative proctosigmoiditis  # H/o checkpoint inhibitor induced colitis s/p Remicade  # RA on tocilizumab & Prednisone  #Tenesmus, bloody diarrhea  CRP down to 5.4 on 60 mg prednisone (now prednisolone) daily following 36 hours on PO steroids. Abdominal pain and stool both subjectively better with gali enema use started yesterday pm. Will move forward with discharge and continued PO steroid trial and initiation of vedolizumab tomorrow. Expect peak effect of vedolizumab to take up to ~2 months. If PO steroid trial fails again (worsened abdominal pain, bloody stools) with hydrocortisone enemas anticipate the next step would be to move forward with colectomy with CRS who has been following this admission.    Recommendations  --Continue Gali enema BID for two weeks  -Prednisone taper:  Recommend a gradual prednisone (PDN) taper schedule per our discussion today:  - 60mg daily for 1 weeks, then  - 50/40/30/25/20/15/10/5mg daily for 1 week each, then  - 5mg every other day for 1 week until off.  -Video visit with Zeferino Sanchez tomorrow (5/12 at 0820)  -Infusion schedule for vedolizumab: 5/12, 5/26, 6/23, 8/2 8/18  -Continue to monitor stool frequency and % containing blood  -With worsened bleeding/abdominal pain may contact [facility] during business hours.  With significantly worsened pain/bloody stool seek care in ED  -Continue Bentyl      GI will sign off. Thank you for involving us in this patient's care. Please do not hesitate to contact the GI service with any questions or concerns.     Pt care plan discussed with Dr. Davey, GI staff physician.    Yamil Diaz CNP  [facility]  Text page          Interval History:   Did better overnight into this am with less abdominal pain on oxycodone, no dilaudid (L side ). 5 total bm yesterday, all with blood, prior to gali enemas. Since 2 enemas yesterday only 2 bm overnight, without blood, none this am yet. Denies fever, chills, nausea, and vomiting.          Medications:     Current Facility-Administered Medications   Medication     acetaminophen (TYLENOL) tablet 975 mg     amLODIPine (NORVASC) tablet 5 mg     glucose gel 15-30 g    Or     dextrose 50 % injection 25-50 mL    Or     glucagon injection 1 mg     glucose gel 15-30 g    Or     dextrose 50 % injection 25-50 mL    Or     glucagon injection 1 mg     dicyclomine (BENTYL) tablet 20 mg     enoxaparin ANTICOAGULANT (LOVENOX) injection 40 mg     gabapentin (NEURONTIN) capsule 900 mg     heparin lock flush 10 UNIT/ML injection 5 mL     heparin lock flush 10 UNIT/ML injection 5-15 mL     hydrocortisone (CORTENEMA) enema 1 enema     HYDROmorphone (PF) (DILAUDID) injection 0.5 mg     hydrOXYzine (ATARAX) tablet 50 mg     insulin aspart (NovoLOG) injection (RAPID ACTING)     insulin aspart (NovoLOG) injection (RAPID ACTING)     lactated ringers infusion     lidocaine (LMX4) cream     lidocaine (LMX4) cream     lidocaine (LMX4) cream     lidocaine 1 % 0.1-1 mL     lidocaine 1 % 0.1-1 mL     lidocaine 1 % 0.1-1 mL     melatonin tablet 1 mg     methocarbamol (ROBAXIN) tablet 500 mg     [Held by provider] methylPREDNISolone sodium succinate (solu-MEDROL) injection 32 mg     naloxone (NARCAN) injection 0.2 mg    Or     naloxone (NARCAN) injection 0.4 mg    Or      "naloxone (NARCAN) injection 0.2 mg    Or     naloxone (NARCAN) injection 0.4 mg     ondansetron (ZOFRAN-ODT) ODT tab 4 mg    Or     ondansetron (ZOFRAN) injection 4 mg     oxyCODONE (ROXICODONE) tablet 5 mg     pantoprazole (PROTONIX) EC tablet 40 mg     sodium chloride (PF) 0.9% PF flush 10 mL     sodium chloride (PF) 0.9% PF flush 10 mL     sodium chloride (PF) 0.9% PF flush 10-20 mL     sodium chloride (PF) 0.9% PF flush 10-20 mL     sodium chloride (PF) 0.9% PF flush 3 mL     sodium chloride (PF) 0.9% PF flush 3 mL     venlafaxine (EFFEXOR-XR) 24 hr capsule 225 mg     Facility-Administered Medications Ordered in Other Encounters   Medication     lidocaine 1 % 9 mL     sodium bicarbonate 8.4 % injection 1 mEq        Physical Exam   Blood pressure (!) 162/98, pulse 62, temperature 98.6  F (37  C), temperature source Oral, resp. rate 18, height 1.6 m (5' 3\"), weight 113.9 kg (251 lb 3.2 oz), last menstrual period 04/30/2021, SpO2 93 %, not currently breastfeeding.  Constitutional: cooperative, pleasant, not dyspneic/diaphoretic, no acute distress  Eyes: Sclera anicteric/injected  Ears/nose/mouth/throat: Normal oropharynx without ulcers or exudate, mucus membranes moist  Neck: supple  CV: No edema  Respiratory: Unlabored breathing  Abd: Non distended, obese, +bs, no hepatosplenomegaly, mildly tender, L>R, no peritoneal signs  Skin: warm, perfused, no jaundice  Neuro: AAO x 3  Psych: Normal affect  MSK: No gross deformities    Data   Current Labs  CBC  Recent Labs   Lab 05/10/21  0702 05/09/21  0718 05/08/21  1739 05/08/21  1627   WBC 10.8 13.6* 17.8* Canceled, Test credited   RBC 3.91 4.13 4.15 Canceled, Test credited   HGB 12.5 13.0 12.7 Canceled, Test credited   HCT 37.7 39.5 38.6 Canceled, Test credited   MCV 96 96 93 Canceled, Test credited   MCH 32.0 31.5 30.6 Canceled, Test credited   MCHC 33.2 32.9 32.9 Canceled, Test credited   RDW 12.9 12.8 12.8 Canceled, Test credited    308 320 Canceled, Test " credited     BMP  Recent Labs   Lab 05/11/21  0752 05/10/21  0702 05/09/21  0718 05/08/21  1627 05/06/21  1356 05/06/21  1356    139 140 136   < > 138   POTASSIUM 4.0 4.0 4.0 4.1   < > 4.2   CHLORIDE 106 105 106 106   < > 105   CO2 PENDING 28 27 22   < > 24   ANIONGAP PENDING 6 7 8   < > 9   GLC PENDING 145* 133* 196*   < > 149*   BUN PENDING 15 17 16   < > 19   CR PENDING 0.56 0.65 0.71   < > 0.66   GFRESTIMATED PENDING >90 >90 >90   < > >90   GFRESTBLACK PENDING >90 >90 >90   < > >90   PATRICIA PENDING 9.1 8.9 9.2   < > 8.3*   MAG  --   --   --   --   --  2.6*   PHOS  --   --   --   --   --  2.7    < > = values in this interval not displayed.      INR  Recent Labs   Lab 05/06/21  1356   INR 0.96     Liver panel  Recent Labs   Lab 05/07/21  0544 05/06/21  1356   PROTTOTAL 5.4* 6.7*   ALBUMIN 2.8* 3.4   BILITOTAL 0.5 0.2   ALKPHOS 42 52   AST 10 21   ALT 63* 91*       Imaging/procedures:  Reviewed in EMR

## 2021-05-12 ENCOUNTER — PATIENT OUTREACH (OUTPATIENT)
Dept: NURSING | Facility: CLINIC | Age: 46
End: 2021-05-12
Payer: COMMERCIAL

## 2021-05-12 ENCOUNTER — VIRTUAL VISIT (OUTPATIENT)
Dept: GASTROENTEROLOGY | Facility: CLINIC | Age: 46
End: 2021-05-12
Payer: COMMERCIAL

## 2021-05-12 ENCOUNTER — PRE VISIT (OUTPATIENT)
Dept: GASTROENTEROLOGY | Facility: CLINIC | Age: 46
End: 2021-05-12

## 2021-05-12 ENCOUNTER — INFUSION THERAPY VISIT (OUTPATIENT)
Dept: INFUSION THERAPY | Facility: CLINIC | Age: 46
End: 2021-05-12
Attending: INTERNAL MEDICINE
Payer: COMMERCIAL

## 2021-05-12 VITALS
SYSTOLIC BLOOD PRESSURE: 115 MMHG | OXYGEN SATURATION: 97 % | DIASTOLIC BLOOD PRESSURE: 58 MMHG | TEMPERATURE: 97.9 F | RESPIRATION RATE: 16 BRPM | HEART RATE: 81 BPM

## 2021-05-12 VITALS — BODY MASS INDEX: 43.77 KG/M2 | HEIGHT: 63 IN | WEIGHT: 247 LBS

## 2021-05-12 DIAGNOSIS — K51.919 ULCERATIVE COLITIS WITH COMPLICATION, UNSPECIFIED LOCATION (H): Primary | ICD-10-CM

## 2021-05-12 DIAGNOSIS — K51.311 ULCERATIVE RECTOSIGMOIDITIS WITH RECTAL BLEEDING (H): Primary | ICD-10-CM

## 2021-05-12 DIAGNOSIS — C43.9 METASTATIC MALIGNANT MELANOMA (H): ICD-10-CM

## 2021-05-12 DIAGNOSIS — K52.9 COLITIS: ICD-10-CM

## 2021-05-12 LAB
BACTERIA SPEC CULT: NO GROWTH
BACTERIA SPEC CULT: NO GROWTH
COPATH REPORT: NORMAL
SPECIMEN SOURCE: NORMAL
SPECIMEN SOURCE: NORMAL

## 2021-05-12 PROCEDURE — 258N000003 HC RX IP 258 OP 636: Performed by: INTERNAL MEDICINE

## 2021-05-12 PROCEDURE — 250N000011 HC RX IP 250 OP 636: Performed by: INTERNAL MEDICINE

## 2021-05-12 PROCEDURE — 99205 OFFICE O/P NEW HI 60 MIN: CPT | Mod: TEL | Performed by: PHYSICIAN ASSISTANT

## 2021-05-12 PROCEDURE — 99417 PROLNG OP E/M EACH 15 MIN: CPT | Performed by: PHYSICIAN ASSISTANT

## 2021-05-12 PROCEDURE — 96365 THER/PROPH/DIAG IV INF INIT: CPT

## 2021-05-12 RX ADMIN — VEDOLIZUMAB 300 MG: 300 INJECTION, POWDER, LYOPHILIZED, FOR SOLUTION INTRAVENOUS at 14:17

## 2021-05-12 ASSESSMENT — MIFFLIN-ST. JEOR: SCORE: 1734.51

## 2021-05-12 ASSESSMENT — PAIN SCALES - GENERAL: PAINLEVEL: SEVERE PAIN (6)

## 2021-05-12 NOTE — PROGRESS NOTES
Clinic Care Coordination Contact  Community Health Worker Initial Outreach    CHW Initial Information Gathering:  Referral Source: IP Report  Current living arrangement:: I live in a private home  Community Resources: Home Care  Supplies used at home:: None  Equipment Currently Used at Home: none  Informal Support system:: Family  No PCP office visit in Past Year: Yes  Transportation means:: Regular car  CHW Additional Questions  If ED/Hospital discharge, follow-up appointment scheduled as recommended?: No  MyChart active?: Yes  Patient sent Social Determinants of Health questionnaire?: Yes    Patient accepts CC: Yes. Patient scheduled for assessment with [facility] on 5/17 at 11am. Patient noted desire to discuss opportunities for support and resources.     The Clinic Community Health Worker spoke with the patient today to discuss possible Clinic Care Coordination enrollment. The service was described to the patient and immediate needs were discussed. The patient agreed to enrollment and an assessment was scheduled. The patient was provided with contact information for the clinic CHW.             Assessment date: 5/17 @11am    Yesika MCCORMICK  Community Health Worker  [facility]  Clinic Care Coordination  [facility]  [email or web address]   Office: [phone]

## 2021-05-12 NOTE — PATIENT INSTRUCTIONS
It was a pleasure taking care of you today.  I've included a brief summary of our discussion and care plan from today's visit below.  Please review this information with your primary care provider.  ______________________________________________________________________    My recommendations are summarized as follows:    --    Return to GI Clinic in 4 weeks to review your progress.    ______________________________________________________________________    How do I schedule labs, imaging studies, or procedures that were ordered in clinic today?     Labs: To schedule lab appointment at the [facility], use my chart or call [phone]. If you have a [facility] closer to home where you are regularly seen you can give them a call.     Procedures: If a colonoscopy, upper endoscopy, breath test, esophageal manometry, or pH impedence was ordered today, our endoscopy team will call you to schedule this. If you have not heard from our endoscopy team within a week, please call [phone] to schedule.     Imaging Studies: If you were scheduled for a CT scan, X-ray, MRI, ultrasound, HIDA scan or other imaging study, please call [phone] to have this scheduled.     Referral: If a referral to another specialty was ordered, expect a phone call or follow instructions above. If you have not heard from anyone regarding your referral in a week, please call our clinic to check the status.     Who do I call with any questions after my visit?  Please be in touch if there are any further questions that arise following today's visit.  There are multiple ways to contact your gastroenterology care team.        During business hours, you may reach a Gastroenterology nurse at [phone] (Chloé)      To schedule or reschedule an appointment, please call [phone].       You can always send a secure message through [employer].  [employer] messages are answered by your nurse or doctor typically within 24 hours.  Please  allow extra time on weekends and holidays.        For urgent/emergent questions after business hours, you may reach the on-call GI Fellow by contacting the [facility]  at [phone].     How will I get the results of any tests ordered?    You will receive all of your results.  If you have signed up for [employer], any tests ordered at your visit will be available to you after your physician reviews them.  Typically this takes 1-2 weeks.  If there are urgent results that require a change in your care plan, your physician or nurse will call you to discuss the next steps.      What is [employer]?  [employer] is a secure way for you to access all of your healthcare records from the [facility].  It is a web based computer program, so you can sign on to it from any location.  It also allows you to send secure messages to your care team.  I recommend signing up for [employer] access if you have not already done so and are comfortable with using a computer.         Sincerely,    Zeferino Sanchez PA-C  [facility]  Division of Gastroenterology

## 2021-05-12 NOTE — LETTER
5/12/2021         RE: Doretha Fernandez  [address]  [place] [address]        Dear Colleague,    Thank you for referring your patient, Doretha Fernandez, to the [facility] [place]. Please see a copy of my visit note below.    Doretha is a 46 year old who is being evaluated via a billable telephone visit.      Patient opted to switch to telephone visit.    What phone number would you like to be contacted at? [phone]  How would you like to obtain your AVS? Eli Turner, EMT    IBD CLINIC VISIT     CC/REFERRING MD:  [facility]  REASON FOR FOLLOW UP: UC proctosigmoiditis  COLLABORATING PHYSICIAN: Renard Davey    IBD HISTORY  Age at diagnosis: 2021  Extent of disease: proctosigmoid   Current UC medications: Vedolizumab and prednisone   Prior UC surgeries: none  Prior IBD Medications:   Infliximab, 1 dose in the hospital good response  Vedolizumab, completed induction dosing  Prednisone, minimal response to high-dose oral and IV with significant weight gain  5-ASA, no response  Topical therapies to include enemas, no response    DRUG MONITORING  TPMT enzyme activity: --    6-TGN/6-MMPN levels: --    Biologic concentration:--    DISEASE ASSESSMENT  Labs:  Lab Results   Component Value Date    ALBUMIN 2.8 05/07/2021     Recent Labs   Lab Test 05/11/21  0752 05/10/21  0702 05/06/21  1356 05/06/21  1356 05/01/21  1302 05/01/21  1302   CRP 5.4 6.4   < > 5.7   < > <2.9   SED  --   --   --  6  --  8    < > = values in this interval not displayed.     Endoscopic assessment: Gunter 3 flex sig 5/7/21  Enterography: --  Fecal calprotectin: --   C diff: 3/2021 treated with vanco taper (at [facility])     IBDQ Score Date IBDQ - Total Score  (Higher score better)   4/10/2018 32         ASSESSMENT/PLAN  Ms. Fernandez is 45 yo F with significant PMH of melanoma, h/o nivolumab induced colitis treated with Remicade and Entyvio now in remission, seronegative RA on prednisone &  tocilizumab, Recently hospitalized 4/26-5/3 with new UC diagnosis, transitioned from IV steroids to PO on 5/3 who presents for initial IBD clinic visit post hospitalization.     1.  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  Clinically patient is also experiencing a severe flare.  CRP has been able to decrease to 5.4 upon discharge on 60 mg of prednisone daily.  She is also instructed to use Mg enemas twice daily which she has yet to use today.  We discussed that this may allow for improvement in the tenesmus to allow for decreased bowel movements.  She is scheduled for her first vedolizumab infusion today however this may take some time to take effect.  If the oral steroid trial fails in addition to the hydrocortisone enemas the next step would be to move forward with a colectomy.  Patient has already met colorectal surgery upon last admission.  Patient is understands this.  --Continue Mg enema twice daily for 2 weeks  --Continue prednisone taper to include 60 mg for a total of 1 week then 50/40/30/25/20/15/10/5 milligrams daily for 1 week each then 5 mg every other day for 1 week until off  --Proceed with vedolizumab infusion today (schedule is 5/12, 5/26, 6/23, 8/2, 8/18)  --Continue to closely monitor clinical status  --May use Bentyl as needed for abdominal cramping    2.  Seronegative RA: Patient has followed with rheumatology.  She is on Tocilizumab in conjunction with current prednisone    3.  History of melanoma complicated by development of checkpoint inhibitor colitis    4.  IBD healthcare maintenance based on patients current medication: Did not get to at today's visit.     RTC 4 weeks    Thank you for this consultation.  188 minutes spent on the date of the encounter doing chart review, history and exam, documentation and further activities as noted above.  It was a pleasure to participate in the care of this patient; please contact us with any further questions.       Zeferino Sanchez PA-C  Division of Gastroenterology, Hepatology and Nutrition  [facility]        HPI:   Ms. 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     Prior to initiating checkpoint inhibitor treatment for melanoma she had a colonoscopy in October 2017 which was normal.  She developed diarrhea, colonoscopy posttreatment then showed inflammatory changes from rectum to transverse colon with biopsy showing moderate active chronic colitis.  She was started on prednisone 100 mg daily and topical and oral mesalamine neither of which allowed for improvement.  She was hospitalized May 2018 and received IV Solu-Medrol without response.  She was given a single dose of infliximab with subsequent improvement and transition to vedolizumab.  March 2019 she was experiencing constipation, EGD and colonoscopy both appeared normal.    She had trouble with significant arthralgias and diffuse pain following rheumatology and pain medicine.  She has required corticosteroid therapy for the past 3 years with significant difficulty in tapering.  She had been initiated on Actemra for what is presumed to be seronegative rheumatoid arthritis.  Trials of adalimumab and Enbrel were not successful.    Starting March 2021 she began having loose stools for the first time in several years.  Her otherwise baseline was 1 formed stool a day.  She went to experiencing 10-13 bloody bowel movements per day sometimes passing clots alone.  This is associated with urgency and tenesmus.  She was found to have C. difficile treated with vancomycin and prolonged taper with plans to do a flexible sigmoidoscopy at [facility].  While receiving the enema for prep  she had a vasovagal type response.  Findings most consistent with ulcerative colitis.  She has also had associated oral aphthous ulcerations making it challenging to eat.    She was most recently admitted to the hospital, discharged yesterday due to abdominal pain and hematochezia.  Patient was discharged on a p.o. steroid trial and initiation of vedolizumab, infusion scheduled for today.  As of today she has had 3 stools, average 5 stools a day currently without blood.  Yesterday stools were more formed however today is more liquid.  She has yet to do the enema yet today.  She has overall abdominal discomfort.  She denies any fevers.    ROS:  Complete 10 System ROS performed. All are negative except as documented below, in the HPI, or in patient questionnaire from today's visit.    No fevers or chills  No weight loss  No blurry vision, double vision or change in vision  No sore throat  No lymphadenopathy  No headache, paraesthesias, or weakness in a limb  No shortness of breath or wheezing  No chest pain or pressure  No arthralgias or myalgias  No rashes or skin changes  No odynophagia or dysphagia  No BRBPR, hematochezia, melena  No dysuria, frequency or urgency  No hot/cold intolerance or polyria  No anxiety or depression    Extra intestinal manifestations of IBD:  No uveitis/episcleritis  No aphthous ulcers   No arthritis   No erythema nodosum/pyoderma gangrenosum.     PERTINENT PAST MEDICAL HISTORY:  Past Medical History:   Diagnosis Date     Abnormal MRI     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      Anxiety      Basal cell carcinoma      Cervical high risk HPV (human papillomavirus) test positive 12/13/2019    See problem list     Colitis      Depression      Diabetes mellitus, iatrogenic (H) 1/28/2020     Inflammatory arthritis      Insomnia      Intestinal giardiasis 3/5/2018     Lumbago     left lower back pain     Lymphedema      Malignant melanoma (H)      Melanoma (H) 10/23/2017      Mild persistent asthma      Obesity      STORMY (obstructive sleep apnea)      Prothrombin deficiency (H)     takes 81mg asa daily     Stroke (cerebrum) (H)     During      TIA (transient ischemic attack)      Type 2 diabetes mellitus (H)        PREVIOUS SURGERIES:  Past Surgical History:   Procedure Laterality Date     APPENDECTOMY  2004     COLONOSCOPY N/A 10/18/2017    Procedure: COLONOSCOPY;  Colon;  Surgeon: Debbie Stephens MD;  [facility]     COLONOSCOPY N/A 3/9/2018    Procedure: COMBINED COLONOSCOPY, SINGLE OR MULTIPLE BIOPSY/POLYPECTOMY BY BIOPSY;  colon;  Surgeon: Benita Schumacher MD;  Location: [facility] GI     COLONOSCOPY      multiple since  to present - about 6 total     DISSECT LYMPH NODE AXILLA Left 10/23/2017    Procedure: DISSECT LYMPH NODE AXILLA;  Left Axillary Lymph Node Dissection ;  Surgeon: Laurent Cool MD;  Location: [facility] OR     EXAM UNDER ANESTHESIA PELVIC N/A 2020    Procedure: EXAM UNDER ANESTHESIA, PELVIS; with Cervical Biopsies, Vaginal Biopsy and Endocervical Curettings;  Surgeon: Melina Jung MD;  Location: [facility] OR     GYN SURGERY  ,          REPAIR MOHS Left 2017    Procedure: REPAIR MOHS;  Left Upper Lid Moh's Reconstruction;  Surgeon: Kisha Bosch MD;  [facility]       ALLERGIES:     Allergies   Allergen Reactions     Bee Venom Swelling     Azithromycin Diarrhea     Erythromycin      Other reaction(s): GI intolerance, Vomiting     Fentanyl Other (See Comments)     sweating  sweating     Prochlorperazine Fatigue     Other reaction(s): Other (see comments)  Fatigue     Buspirone      Other reaction(s): GI intolerance  vomiting     Erythrocin Nausea and Vomiting     Zithromax [Azithromycin Dihydrate] Diarrhea     Enbrel [Etanercept] Hives and Rash       PERTINENT MEDICATIONS:    Current Outpatient Medications:      ACCU-CHEK GUIDE test strip, USE TO TEST BLOOD SUGAR TWO TIMES A DAY OR AS DIRECTED, Disp: 200 strip, Rfl: 0      acetaminophen (TYLENOL) 500 MG tablet, Take 1,000 mg by mouth every 8 hours as needed for mild pain, Disp: , Rfl:      Alcohol Swabs (ALCOHOL PREP) 70 % PADS, 1 applicator 3 times daily, Disp: 100 each, Rfl: 0     amLODIPine (NORVASC) 5 MG tablet, Take 1 tablet (5 mg) by mouth daily, Disp: 30 tablet, Rfl: 0     blood glucose (NO BRAND SPECIFIED) lancets standard, Use to test blood sugar 3 times daily or as directed., Disp: 1 each, Rfl: 0     blood glucose (NO BRAND SPECIFIED) test strip, Use to test blood sugar 3 times daily or as directed., Disp: 200 strip, Rfl: 0     blood glucose monitoring (ACCU-CHEK FASTCLIX) lancets, USE TWO TIMES A DAY OR AS DIRECTED, Disp: 102 each, Rfl: 9     blood glucose monitoring (NO BRAND SPECIFIED) meter device kit, Use to test blood sugar 3 times daily or as directed., Disp: 1 kit, Rfl: 0     Calcium Carb-Cholecalciferol 600-800 MG-UNIT TABS, Take 800 mg by mouth daily before breakfast, Disp: , Rfl:      calcium carbonate (TUMS) 500 MG chewable tablet, Take 1 tablet (500 mg) by mouth 3 times daily as needed for heartburn, Disp: 30 tablet, Rfl: 0     dicyclomine (BENTYL) 20 MG tablet, Take 1 tablet (20 mg) by mouth 3 times daily (with meals), Disp: 30 tablet, Rfl: 0     famotidine (PEPCID) 10 MG tablet, Take 1 tablet (10 mg) by mouth 2 times daily, Disp: 30 tablet, Rfl: 0     ferrous fumarate 65 mg, [place]. FE,-Vitamin C 125 mg (VITRON-C)  MG TABS tablet, Take 1 tablet by mouth daily, Disp: 30 tablet, Rfl: 1     gabapentin (NEURONTIN) 300 MG capsule, TAKE THREE CAPSULES BY MOUTH FOUR TIMES A DAY, Disp: 360 capsule, Rfl: 0     hydrocortisone (CORTENEMA) 100 MG/60ML enema, Place 1 enema rectally 2 times daily for 14 days, Disp: 28 enema, Rfl: 0     hydrOXYzine (ATARAX) 25 MG tablet, TAKE ONE TABLET BY MOUTH AT BEDTIME (Patient taking differently: As needed), Disp: 90 tablet, Rfl: 1     insulin lispro (HUMALOG KWIKPEN) 100 UNIT/ML (1 unit dial) AIDANIKPEN, Inject 1-7 Units Subcutaneous  4 times daily (with meals and nightly) Per the insulin sliding scale provided in the discharge summary., Disp: 3 mL, Rfl: 2     metFORMIN (GLUCOPHAGE-XR) 500 MG 24 hr tablet, Take 1,000 mg by mouth Daily with breakfast., Disp: , Rfl:      ondansetron (ZOFRAN) 8 MG tablet, Take 8 mg by mouth every 8 hours as needed for nausea (Pt has not taken in past year 2/10/20) , Disp: , Rfl:      oxyCODONE (ROXICODONE) 5 MG tablet, Take 1 tablet (5 mg) by mouth every 4 hours as needed for moderate to severe pain, Disp: 20 tablet, Rfl: 0     pantoprazole (PROTONIX) 40 MG EC tablet, Take 1 tablet (40 mg) by mouth every morning (before breakfast), Disp: 30 tablet, Rfl: 0     predniSONE (DELTASONE) 10 MG tablet, Take 6 tablets daily for 7 days, then 5 tablets daily for 7 days, then 4 tablets daily for 7 days, then 3 tablets daily for 7 days. Call the GI doctors for refill after that point., Disp: 126 tablet, Rfl: 0     rosuvastatin (CRESTOR) 10 MG tablet, Take 1 tablet (10 mg) by mouth daily, Disp: 90 tablet, Rfl: 3     sulfamethoxazole-trimethoprim (BACTRIM DS) 800-160 MG tablet, Take 1 tablet by mouth daily, Disp: 45 tablet, Rfl: 0     vancomycin (VANCOCIN) 125 MG capsule, Take 1 capsule (125 mg) by mouth 4 times daily for 3 days, THEN 1 capsule (125 mg) 2 times daily for 7 days, THEN 1 capsule (125 mg) daily for 7 days, THEN 1 capsule (125 mg) every other day for 14 days., Disp: 40 capsule, Rfl: 0     venlafaxine (EFFEXOR-ER) 225 MG 24 hr tablet, TAKE ONE TABLET BY MOUTH ONCE DAILY, Disp: 90 tablet, Rfl: 1     VITAMIN D3 25 MCG (1000 UT) tablet, TAKE THREE TABLETS BY MOUTH ONCE DAILY, Disp: 300 tablet, Rfl: 1  No current facility-administered medications for this visit.     Facility-Administered Medications Ordered in Other Visits:      lidocaine 1 % 9 mL, 9 mL, Intradermal, Once, Anna Naidu MD     sodium bicarbonate 8.4 % injection 1 mEq, 1 mEq, Intradermal, Once, Anna Naidu MD    SOCIAL HISTORY:  Social History      Socioeconomic History     Marital status:      Spouse name: Not on file     Number of children: Not on file     Years of education: Not on file     Highest education level: Not on file   Occupational History     Not on file   Social Needs     Financial resource strain: Not on file     Food insecurity     Worry: Not on file     Inability: Not on file     Transportation needs     Medical: Not on file     Non-medical: Not on file   Tobacco Use     Smoking status: Former Smoker     Packs/day: 1.00     Years: 5.00     Pack years: 5.00     Quit date: 3/20/1998     Years since quittin.1     Smokeless tobacco: Never Used   Substance and Sexual Activity     Alcohol use: Yes     Comment: occ     Drug use: No     Sexual activity: Not Currently     Partners: Male     Birth control/protection: Surgical   Lifestyle     Physical activity     Days per week: Not on file     Minutes per session: Not on file     Stress: Not on file   Relationships     Social connections     Talks on phone: Not on file     Gets together: Not on file     Attends Scientologist service: Not on file     Active member of club or organization: Not on file     Attends meetings of clubs or organizations: Not on file     Relationship status: Not on file     Intimate partner violence     Fear of current or ex partner: Not on file     Emotionally abused: Not on file     Physically abused: Not on file     Forced sexual activity: Not on file   Other Topics Concern     Parent/sibling w/ CABG, MI or angioplasty before 65F 55M? No   Social History Narrative    20 y.o- patient's mother   of lung cancer. She had to take care of her younger sister.    2012- patient's  had a heart attack with stents placed, followed by cardiac rehabilitation    2000 TO 2012  was in [facility] psychiatric hospital for depression    2013 patient's  went through alcohol rehabilitation at [facility]             They have attended couple counseling a couple of times and patient went to the family program for chemical dependency.    Patient denies alcohol or drug use and herself            Has 2 children, girls ages 10 and 13     For a while she was working 3 jobs since her  was ill. 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 Reports her job is very stressful.           FAMILY HISTORY:  Father with history of colon polyps.  Paternal grandmother with history of colon cancer in her 50s or 60s.  Father did also history of diverticulitis as well as IBS.  Her child has been diagnosed with IBS.  No history of Crohn's disease.  No history of ulcerative colitis.   Family History   Problem Relation Age of Onset     Cancer Mother 45        lung     Neurologic Disorder Mother         epilepsy     Lipids Father      Gastrointestinal Disease Father         diverticulitis      Depression Father      Colitis Father      Colon Cancer Father      Diverticulitis Father      Cancer Maternal Grandmother      Blood Disease Maternal Grandmother         lymphoma      Arthritis Maternal Grandmother      Diabetes Maternal Grandmother      Depression Maternal Grandmother      Macular Degeneration Maternal Grandmother      Glaucoma Maternal Grandmother      Diabetes Maternal Grandfather      Cerebrovascular Disease Maternal Grandfather      Blood Disease Maternal Grandfather      Heart Disease Maternal Grandfather      Glaucoma Maternal Grandfather      Cancer Paternal Grandmother      Cancer - colorectal Paternal Grandmother      Colitis Paternal Grandmother      Colon Cancer Paternal Grandmother      Diverticulitis Paternal Grandmother      Respiratory Paternal Grandfather         emphysema      Colitis Paternal Grandfather      Colon Cancer Paternal Grandfather      Diverticulitis Paternal Grandfather      Heart Disease Daughter      Asthma Daughter      Depression Sister      Melanoma No family hx of        Past/family/social  "history reviewed and no changes    PHYSICAL EXAMINATION:  Constitutional: aaox3, cooperative, pleasant, not dyspneic/diaphoretic, no acute distress  Vitals reviewed: Ht 1.6 m (5' 3\")   Wt 112 kg (247 lb)   LMP 04/30/2021 (Exact Date)   BMI 43.75 kg/m    Wt:   Wt Readings from Last 2 Encounters:   05/12/21 112 kg (247 lb)   05/10/21 113.9 kg (251 lb 3.2 oz)      This visit was conducted through a telephone visit. The physical exam was deferred.      PERTINENT STUDIES:  Most recent CBC:  Recent Labs   Lab Test 05/11/21  1059 05/11/21  0752   WBC 13.9* Canceled, Test credited   HGB 12.4 Canceled, Test credited   HCT 37.7 Canceled, Test credited    Canceled, Test credited     Most recent hepatic panel:  Recent Labs   Lab Test 05/07/21  0544 05/06/21  1356   ALT 63* 91*   AST 10 21     Most recent creatinine:  Recent Labs   Lab Test 05/11/21  0752 05/10/21  0702   CR 0.70 0.56       Again, thank you for allowing me to participate in the care of your patient.      Sincerely,    Zeferino Sanchez PA-C    "

## 2021-05-12 NOTE — PROGRESS NOTES
The patient is scheduled for clinic follow up within 24-48 hours of hospital discharge. No post-hospital call is needed at this time.    Name: Doretha Fernandez MRN: [id number]   Date: 5/12/2021 Status: [facility]   Time: 8:20 AM Length: 40   Visit Type: VIDEO VISIT NEW [1702] JAMES:     Provider: Zeferino Sanchez PA-C Department: [facility]OLOG[facility]         Rufian Menon CMA, MORA  Post Hospital Discharge Team

## 2021-05-12 NOTE — PROGRESS NOTES
Infusion Nursing Note:  Doretha Fernandez presents today for Entyvio.    Patient seen by provider today: No   present during visit today: Not Applicable.    Note: N/A.    Intravenous Access:  Peripheral IV placed.    Treatment Conditions:  Biological Infusion Checklist:  ~~~ NOTE: If the patient answers yes to any of the questions below, hold the infusion and contact ordering provider or on-call provider.    1. Have you recently had an elevated temperature, fever, chills, productive cough, coughing for 3 weeks or longer or hemoptysis, abnormal vital signs, night sweats,  chest pain or have you noticed a decrease in your appetite, unexplained weight loss or fatigue? No  2. Do you have any open wounds or new incisions? No  3. Do you have any recent or upcoming hospitalizations, surgeries or dental procedures? No  4. Do you currently have or recently have had any signs of illness or infection or are you on any antibiotics? Yes, antibiotics for colitis   5. Have you had any new, sudden or worsening abdominal pain? No  6. Have you or anyone in your household received a live vaccination in the past 4 weeks? Please note:  No live vaccines while on biologic/chemotherapy until 6 months after the last treatment.  Patient can receive the flu vaccine (shot only) and the pneumovax.  It is optimal for the patient to get these vaccines mid cycle, but they can be given at any time as long as it is not on the day of the infusion. No  7. Have you recently been diagnosed with any new nervous system diseases (ie. Multiple sclerosis, Guillain [place], seizures, neurological changes) or cancer diagnosis? No  8. Are you on any form of radiation or chemotherapy? No  9. Are you pregnant or breast feeding or do you have plans of pregnancy in the future? No  10. Have you been having any signs of worsening depression or suicidal ideations?  (benlysta only) No  11. Have there been any other new onset medical symptoms? No        Post  Infusion Assessment:  Patient tolerated infusion without incident.  Blood return noted pre and post infusion.  Site patent and intact, free from redness, edema or discomfort.  No evidence of extravasations.  Access discontinued per protocol.       Discharge Plan:   Discharge instructions reviewed with: Patient.  Patient and/or family verbalized understanding of discharge instructions and all questions answered.  Copy of AVS reviewed with patient and/or family.  Patient will return 5/26/21 for next appointment.  Patient discharged in stable condition accompanied by: self.  Departure Mode: Ambulatory.    Lay Styles RN

## 2021-05-12 NOTE — PROGRESS NOTES
Doretha is a 46 year old who is being evaluated via a billable telephone visit.      Patient opted to switch to telephone visit.    What phone number would you like to be contacted at? [phone]  How would you like to obtain your AVS? Eli Turner, EMT    IBD CLINIC VISIT     CC/REFERRING MD:  [facility] [place]  REASON FOR FOLLOW UP: UC proctosigmoiditis  COLLABORATING PHYSICIAN: Reanrd Davey    IBD HISTORY  Age at diagnosis: 2021  Extent of disease: proctosigmoid   Current UC medications: Vedolizumab and prednisone   Prior UC surgeries: none  Prior IBD Medications:   Infliximab, 1 dose in the hospital good response  Vedolizumab, completed induction dosing  Prednisone, minimal response to high-dose oral and IV with significant weight gain  5-ASA, no response  Topical therapies to include enemas, no response    DRUG MONITORING  TPMT enzyme activity: --    6-TGN/6-MMPN levels: --    Biologic concentration:--    DISEASE ASSESSMENT  Labs:  Lab Results   Component Value Date    ALBUMIN 2.8 05/07/2021     Recent Labs   Lab Test 05/11/21  0752 05/10/21  0702 05/06/21  1356 05/06/21  1356 05/01/21  1302 05/01/21  1302   CRP 5.4 6.4   < > 5.7   < > <2.9   SED  --   --   --  6  --  8    < > = values in this interval not displayed.     Endoscopic assessment: Gunter 3 flex sig 5/7/21  Enterography: --  Fecal calprotectin: --   C diff: 3/2021 treated with vanco taper (at [facility])     IBDQ Score Date IBDQ - Total Score  (Higher score better)   4/10/2018 32         ASSESSMENT/PLAN  Ms. 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     1.  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  Clinically  patient is also experiencing a severe flare.  CRP has been able to decrease to 5.4 upon discharge on 60 mg of prednisone daily.  She is also instructed to use Mg enemas twice daily which she has yet to use today.  We discussed that this may allow for improvement in the tenesmus to allow for decreased bowel movements.  She is scheduled for her first vedolizumab infusion today however this may take some time to take effect.  If the oral steroid trial fails in addition to the hydrocortisone enemas the next step would be to move forward with a colectomy.  Patient has already met colorectal surgery upon last admission.  Patient is understands this.  --Continue Mg enema twice daily for 2 weeks  --Continue prednisone taper to include 60 mg for a total of 1 week then 50/40/30/25/20/15/10/5 milligrams daily for 1 week each then 5 mg every other day for 1 week until off  --Proceed with vedolizumab infusion today (schedule is 5/12, 5/26, 6/23, 8/2, 8/18)  --Continue to closely monitor clinical status  --May use Bentyl as needed for abdominal cramping    2.  Seronegative RA: Patient has followed with rheumatology.  She is on Tocilizumab in conjunction with current prednisone    3.  History of melanoma complicated by development of checkpoint inhibitor colitis    4.  IBD healthcare maintenance based on patients current medication: Did not get to at today's visit.     RTC 4 weeks    Thank you for this consultation.  188 minutes spent on the date of the encounter doing chart review, history and exam, documentation and further activities as noted above.  It was a pleasure to participate in the care of this patient; please contact us with any further questions.      Zeferino Sanchez PA-C  Division of Gastroenterology, Hepatology and Nutrition  [facility]        HPI:   Ms. 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     Prior to initiating checkpoint inhibitor treatment for melanoma she had a colonoscopy in October 2017 which was normal.  She developed diarrhea, colonoscopy posttreatment then showed inflammatory changes from rectum to transverse colon with biopsy showing moderate active chronic colitis.  She was started on prednisone 100 mg daily and topical and oral mesalamine neither of which allowed for improvement.  She was hospitalized May 2018 and received IV Solu-Medrol without response.  She was given a single dose of infliximab with subsequent improvement and transition to vedolizumab.  March 2019 she was experiencing constipation, EGD and colonoscopy both appeared normal.    She had trouble with significant arthralgias and diffuse pain following rheumatology and pain medicine.  She has required corticosteroid therapy for the past 3 years with significant difficulty in tapering.  She had been initiated on Actemra for what is presumed to be seronegative rheumatoid arthritis.  Trials of adalimumab and Enbrel were not successful.    Starting March 2021 she began having loose stools for the first time in several years.  Her otherwise baseline was 1 formed stool a day.  She went to experiencing 10-13 bloody bowel movements per day sometimes passing clots alone.  This is associated with urgency and tenesmus.  She was found to have C. difficile treated with vancomycin and prolonged taper with plans to do a flexible sigmoidoscopy at [facility].  While receiving the enema for prep she had a vasovagal type response.  Findings most consistent with ulcerative colitis.  She has also had associated oral aphthous ulcerations making it challenging to eat.    She was most recently admitted to the hospital, discharged yesterday due to abdominal pain and  hematochezia.  Patient was discharged on a p.o. steroid trial and initiation of vedolizumab, infusion scheduled for today.  As of today she has had 3 stools, average 5 stools a day currently without blood.  Yesterday stools were more formed however today is more liquid.  She has yet to do the enema yet today.  She has overall abdominal discomfort.  She denies any fevers.    ROS:  Complete 10 System ROS performed. All are negative except as documented below, in the HPI, or in patient questionnaire from today's visit.    No fevers or chills  No weight loss  No blurry vision, double vision or change in vision  No sore throat  No lymphadenopathy  No headache, paraesthesias, or weakness in a limb  No shortness of breath or wheezing  No chest pain or pressure  No arthralgias or myalgias  No rashes or skin changes  No odynophagia or dysphagia  No BRBPR, hematochezia, melena  No dysuria, frequency or urgency  No hot/cold intolerance or polyria  No anxiety or depression    Extra intestinal manifestations of IBD:  No uveitis/episcleritis  No aphthous ulcers   No arthritis   No erythema nodosum/pyoderma gangrenosum.     PERTINENT PAST MEDICAL HISTORY:  Past Medical History:   Diagnosis Date     Abnormal MRI     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      Anxiety      Basal cell carcinoma      Cervical high risk HPV (human papillomavirus) test positive 2019    See problem list     Colitis      Depression      Diabetes mellitus, iatrogenic (H) 2020     Inflammatory arthritis      Insomnia      Intestinal giardiasis 3/5/2018     Lumbago     left lower back pain     Lymphedema      Malignant melanoma (H)      Melanoma (H) 10/23/2017     Mild persistent asthma      Obesity      STORMY (obstructive sleep apnea)      Prothrombin deficiency (H)     takes 81mg asa daily     Stroke (cerebrum) (H)     During      TIA (transient ischemic attack)      Type 2 diabetes mellitus (H)        PREVIOUS  SURGERIES:  Past Surgical History:   Procedure Laterality Date     APPENDECTOMY       COLONOSCOPY N/A 10/18/2017    Procedure: COLONOSCOPY;  Colon;  Surgeon: Debbie Stephens MD;  [facility]     COLONOSCOPY N/A 3/9/2018    Procedure: COMBINED COLONOSCOPY, SINGLE OR MULTIPLE BIOPSY/POLYPECTOMY BY BIOPSY;  colon;  Surgeon: Benita Schumacher MD;  Location: [facility]     COLONOSCOPY      multiple since 2018 to present - about 6 total     DISSECT LYMPH NODE AXILLA Left 10/23/2017    Procedure: DISSECT LYMPH NODE AXILLA;  Left Axillary Lymph Node Dissection ;  Surgeon: Laurent Cool MD;  Location: [facility]     EXAM UNDER ANESTHESIA PELVIC N/A 2020    Procedure: EXAM UNDER ANESTHESIA, PELVIS; with Cervical Biopsies, Vaginal Biopsy and Endocervical Curettings;  Surgeon: Melina Jung MD;  Location: [facility] OR     GYN SURGERY  ,          REPAIR MOHS Left 2017    Procedure: REPAIR MOHS;  Left Upper Lid Moh's Reconstruction;  Surgeon: Kisha Bosch MD;  [facility]       ALLERGIES:     Allergies   Allergen Reactions     Bee Venom Swelling     Azithromycin Diarrhea     Erythromycin      Other reaction(s): GI intolerance, Vomiting     Fentanyl Other (See Comments)     sweating  sweating     Prochlorperazine Fatigue     Other reaction(s): Other (see comments)  Fatigue     Buspirone      Other reaction(s): GI intolerance  vomiting     Erythrocin Nausea and Vomiting     Zithromax [Azithromycin Dihydrate] Diarrhea     Enbrel [Etanercept] Hives and Rash       PERTINENT MEDICATIONS:    Current Outpatient Medications:      ACCU-CHEK GUIDE test strip, USE TO TEST BLOOD SUGAR TWO TIMES A DAY OR AS DIRECTED, Disp: 200 strip, Rfl: 0     acetaminophen (TYLENOL) 500 MG tablet, Take 1,000 mg by mouth every 8 hours as needed for mild pain, Disp: , Rfl:      Alcohol Swabs (ALCOHOL PREP) 70 % PADS, 1 applicator 3 times daily, Disp: 100 each, Rfl: 0     amLODIPine (NORVASC) 5 MG tablet, Take 1 tablet (5 mg) by  mouth daily, Disp: 30 tablet, Rfl: 0     blood glucose (NO BRAND SPECIFIED) lancets standard, Use to test blood sugar 3 times daily or as directed., Disp: 1 each, Rfl: 0     blood glucose (NO BRAND SPECIFIED) test strip, Use to test blood sugar 3 times daily or as directed., Disp: 200 strip, Rfl: 0     blood glucose monitoring (ACCU-CHEK FASTCLIX) lancets, USE TWO TIMES A DAY OR AS DIRECTED, Disp: 102 each, Rfl: 9     blood glucose monitoring (NO BRAND SPECIFIED) meter device kit, Use to test blood sugar 3 times daily or as directed., Disp: 1 kit, Rfl: 0     Calcium Carb-Cholecalciferol 600-800 MG-UNIT TABS, Take 800 mg by mouth daily before breakfast, Disp: , Rfl:      calcium carbonate (TUMS) 500 MG chewable tablet, Take 1 tablet (500 mg) by mouth 3 times daily as needed for heartburn, Disp: 30 tablet, Rfl: 0     dicyclomine (BENTYL) 20 MG tablet, Take 1 tablet (20 mg) by mouth 3 times daily (with meals), Disp: 30 tablet, Rfl: 0     famotidine (PEPCID) 10 MG tablet, Take 1 tablet (10 mg) by mouth 2 times daily, Disp: 30 tablet, Rfl: 0     ferrous fumarate 65 mg, [place]. FE,-Vitamin C 125 mg (VITRON-C)  MG TABS tablet, Take 1 tablet by mouth daily, Disp: 30 tablet, Rfl: 1     gabapentin (NEURONTIN) 300 MG capsule, TAKE THREE CAPSULES BY MOUTH FOUR TIMES A DAY, Disp: 360 capsule, Rfl: 0     hydrocortisone (CORTENEMA) 100 MG/60ML enema, Place 1 enema rectally 2 times daily for 14 days, Disp: 28 enema, Rfl: 0     hydrOXYzine (ATARAX) 25 MG tablet, TAKE ONE TABLET BY MOUTH AT BEDTIME (Patient taking differently: As needed), Disp: 90 tablet, Rfl: 1     insulin lispro (HUMALOG KWIKPEN) 100 UNIT/ML (1 unit dial) KWIKPEN, Inject 1-7 Units Subcutaneous 4 times daily (with meals and nightly) Per the insulin sliding scale provided in the discharge summary., Disp: 3 mL, Rfl: 2     metFORMIN (GLUCOPHAGE-XR) 500 MG 24 hr tablet, Take 1,000 mg by mouth Daily with breakfast., Disp: , Rfl:      ondansetron (ZOFRAN) 8 MG tablet,  Take 8 mg by mouth every 8 hours as needed for nausea (Pt has not taken in past year 2/10/20) , Disp: , Rfl:      oxyCODONE (ROXICODONE) 5 MG tablet, Take 1 tablet (5 mg) by mouth every 4 hours as needed for moderate to severe pain, Disp: 20 tablet, Rfl: 0     pantoprazole (PROTONIX) 40 MG EC tablet, Take 1 tablet (40 mg) by mouth every morning (before breakfast), Disp: 30 tablet, Rfl: 0     predniSONE (DELTASONE) 10 MG tablet, Take 6 tablets daily for 7 days, then 5 tablets daily for 7 days, then 4 tablets daily for 7 days, then 3 tablets daily for 7 days. Call the GI doctors for refill after that point., Disp: 126 tablet, Rfl: 0     rosuvastatin (CRESTOR) 10 MG tablet, Take 1 tablet (10 mg) by mouth daily, Disp: 90 tablet, Rfl: 3     sulfamethoxazole-trimethoprim (BACTRIM DS) 800-160 MG tablet, Take 1 tablet by mouth daily, Disp: 45 tablet, Rfl: 0     vancomycin (VANCOCIN) 125 MG capsule, Take 1 capsule (125 mg) by mouth 4 times daily for 3 days, THEN 1 capsule (125 mg) 2 times daily for 7 days, THEN 1 capsule (125 mg) daily for 7 days, THEN 1 capsule (125 mg) every other day for 14 days., Disp: 40 capsule, Rfl: 0     venlafaxine (EFFEXOR-ER) 225 MG 24 hr tablet, TAKE ONE TABLET BY MOUTH ONCE DAILY, Disp: 90 tablet, Rfl: 1     VITAMIN D3 25 MCG (1000 UT) tablet, TAKE THREE TABLETS BY MOUTH ONCE DAILY, Disp: 300 tablet, Rfl: 1  No current facility-administered medications for this visit.     Facility-Administered Medications Ordered in Other Visits:      lidocaine 1 % 9 mL, 9 mL, Intradermal, Once, Anna Naidu MD     sodium bicarbonate 8.4 % injection 1 mEq, 1 mEq, Intradermal, Once, Anna Naidu MD    SOCIAL HISTORY:  Social History     Socioeconomic History     Marital status:      Spouse name: Not on file     Number of children: Not on file     Years of education: Not on file     Highest education level: Not on file   Occupational History     Not on file   Social Needs     Financial resource  strain: Not on file     Food insecurity     Worry: Not on file     Inability: Not on file     Transportation needs     Medical: Not on file     Non-medical: Not on file   Tobacco Use     Smoking status: Former Smoker     Packs/day: 1.00     Years: 5.00     Pack years: 5.00     Quit date: 3/20/1998     Years since quittin.1     Smokeless tobacco: Never Used   Substance and Sexual Activity     Alcohol use: Yes     Comment: occ     Drug use: No     Sexual activity: Not Currently     Partners: Male     Birth control/protection: Surgical   Lifestyle     Physical activity     Days per week: Not on file     Minutes per session: Not on file     Stress: Not on file   Relationships     Social connections     Talks on phone: Not on file     Gets together: Not on file     Attends [employer]ism service: Not on file     Active member of club or organization: Not on file     Attends meetings of clubs or organizations: Not on file     Relationship status: Not on file     Intimate partner violence     Fear of current or ex partner: Not on file     Emotionally abused: Not on file     Physically abused: Not on file     Forced sexual activity: Not on file   Other Topics Concern     Parent/sibling w/ CABG, MI or angioplasty before 65F 55M? No   Social History Narrative    20 y.o- patient's mother   of lung cancer. She had to take care of her younger sister.    2012- patient's  had a heart attack with stents placed, followed by cardiac rehabilitation    2000 TO 2012  was in [facility] for [facility]    2013 patient's  went through alcohol rehabilitation at [facility] inpatient            They have attended couple counseling a couple of times and patient went to the family program for chemical dependency.    Patient denies alcohol or drug use and herself            Has 2 children, girls ages 10 and 13     For a while she was working 3 jobs since her   "was ill. 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 Reports her job is very stressful.           FAMILY HISTORY:  Father with history of colon polyps.  Paternal grandmother with history of colon cancer in her 50s or 60s.  Father did also history of diverticulitis as well as IBS.  Her child has been diagnosed with IBS.  No history of Crohn's disease.  No history of ulcerative colitis.   Family History   Problem Relation Age of Onset     Cancer Mother 45        lung     Neurologic Disorder Mother         epilepsy     Lipids Father      Gastrointestinal Disease Father         diverticulitis      Depression Father      Colitis Father      Colon Cancer Father      Diverticulitis Father      Cancer Maternal Grandmother      Blood Disease Maternal Grandmother         lymphoma      Arthritis Maternal Grandmother      Diabetes Maternal Grandmother      Depression Maternal Grandmother      Macular Degeneration Maternal Grandmother      Glaucoma Maternal Grandmother      Diabetes Maternal Grandfather      Cerebrovascular Disease Maternal Grandfather      Blood Disease Maternal Grandfather      Heart Disease Maternal Grandfather      Glaucoma Maternal Grandfather      Cancer Paternal Grandmother      Cancer - colorectal Paternal Grandmother      Colitis Paternal Grandmother      Colon Cancer Paternal Grandmother      Diverticulitis Paternal Grandmother      Respiratory Paternal Grandfather         emphysema      Colitis Paternal Grandfather      Colon Cancer Paternal Grandfather      Diverticulitis Paternal Grandfather      Heart Disease Daughter      Asthma Daughter      Depression Sister      Melanoma No family hx of        Past/family/social history reviewed and no changes    PHYSICAL EXAMINATION:  Constitutional: aaox3, cooperative, pleasant, not dyspneic/diaphoretic, no acute distress  Vitals reviewed: Ht 1.6 m (5' 3\")   Wt 112 kg (247 lb)   LMP 04/30/2021 (Exact Date)   BMI 43.75 kg/m    Wt:   Wt Readings " from Last 2 Encounters:   05/12/21 112 kg (247 lb)   05/10/21 113.9 kg (251 lb 3.2 oz)      This visit was conducted through a telephone visit. The physical exam was deferred.      PERTINENT STUDIES:  Most recent CBC:  Recent Labs   Lab Test 05/11/21  1059 05/11/21  0752   WBC 13.9* Canceled, Test credited   HGB 12.4 Canceled, Test credited   HCT 37.7 Canceled, Test credited    Canceled, Test credited     Most recent hepatic panel:  Recent Labs   Lab Test 05/07/21  0544 05/06/21  1356   ALT 63* 91*   AST 10 21     Most recent creatinine:  Recent Labs   Lab Test 05/11/21  0752 05/10/21  0702   CR 0.70 0.56

## 2021-05-14 ENCOUNTER — PATIENT OUTREACH (OUTPATIENT)
Dept: GASTROENTEROLOGY | Facility: CLINIC | Age: 46
End: 2021-05-14

## 2021-05-14 ENCOUNTER — PATIENT OUTREACH (OUTPATIENT)
Dept: GASTROENTEROLOGY | Facility: CLINIC | Age: 46
End: 2021-05-14
Payer: COMMERCIAL

## 2021-05-14 ENCOUNTER — NURSE TRIAGE (OUTPATIENT)
Dept: CALL CENTER | Age: 46
End: 2021-05-14

## 2021-05-14 NOTE — PROGRESS NOTES
Called patient in respond to a call to the triage line  Patient had a home health nurse visit today  Pt did take enemas twice yesterday but did not take this am she said due to nurse visit  No bowel movement today   Passed blood clots one time this am  Had diarrhea previously on May 12   No bowel movement on May 13  Some pan in abdomen and flank pain this is not new  New comp[plaint of some back pain   No nausea or vomiting   Taking fluids    Sent numbers to call after office hours if symptoms[toms increase  Also aware if symptoms esc late more blood in stools and clots  stools an increase in pain along with rigid abdomen with distension      Plan enemas twice day  Continue prednisone 60 mg daily  Next infusion in 2 weeks  Dr. Petar rodriguez

## 2021-05-14 NOTE — TELEPHONE ENCOUNTER
"      Additional Information    Negative: Passed out (i.e., fainted, collapsed and was not responding)    Negative: Shock suspected (e.g., cold/pale/clammy skin, too weak to stand, low BP, rapid pulse)    Negative: Sounds like a life-threatening emergency to the triager    Answer Assessment - Initial Assessment Questions  1. LOCATION: \"Where does it hurt?\"       abd- both side  2. RADIATION: \"Does the pain shoot anywhere else?\" (e.g., chest, back)  nyla flank      3. ONSET: \"When did the pain begin?\" (e.g., minutes, hours or days ago)      day  4. SUDDEN: \"Gradual or sudden onset?\"  gradual        5. PATTERN \"Does the pain come and go, or is it constant?\"     - If constant: \"Is it getting better, staying the same, or worsening?\"       (Note: Constant means the pain never goes away completely; most serious pain is constant and it progresses)      - If intermittent: \"How long does it last?\" \"Do you have pain now?\"      (Note: Intermittent means the pain goes away completely between bouts)        6. SEVERITY: \"How bad is the pain?\"  (e.g., Scale 1-10; mild, moderate, or severe)    - MILD (1-3): doesn't interfere with normal activities, abdomen soft and not tender to touch     - MODERATE (4-7): interferes with normal activities or awakens from sleep, tender to touch     - SEVERE (8-10): excruciating pain, doubled over, unable to do any normal activities       # 5-6, has taken oxycodone- helps some  7. RECURRENT SYMPTOM: \"Have you ever had this type of abdominal pain before?\" If so, ask: \"When was the last time?\" and \"What happened that time?\"       Took enema yesterday- no results  At 7 AM today went to have BM and passed blood, many clots the size of 10cent to 25 cent.  Has not passed gas, has bowel sounds    8. CAUSE: \"What do you think is causing the abdominal pain?\"        9. RELIEVING/AGGRAVATING FACTORS: \"What makes it better or worse?\" (e.g., movement, antacids, bowel movement)        10. OTHER SYMPTOMS: \"Has " "there been any vomiting, diarrhea, constipation, or urine problems?\"  Denies chest pain,SOB,  dizziness, fever, N/V, abd swelling  States had bagel, sandwich and drinking without difficulty. Urine out normal amt, slight dark-this is her norm           11. PREGNANCY: \"Is there any chance you are pregnant?\" \"When was your last menstrual period?\"    Protocols used: ABDOMINAL PAIN - FEMALE-A-[place]    [facility]: Nurse Triage Note  SITUATION/BACKGROUND                                                      Doretha Fernandez is a 46 year old female who calls with abd pain, bleeding-per rectum. See above-    Pt # [phone]     Pt of FRANK Sanchez PA-C, GI Clinic  Hx: h/o nivolumab induced colitis treated      Allergies:   Allergies   Allergen Reactions     Bee Venom Swelling     Azithromycin Diarrhea     Erythromycin      Other reaction(s): GI intolerance, Vomiting     Fentanyl Other (See Comments)     sweating  sweating     Prochlorperazine Fatigue     Other reaction(s): Other (see comments)  Fatigue     Buspirone      Other reaction(s): GI intolerance  vomiting     Erythrocin Nausea and Vomiting     Zithromax [Azithromycin Dihydrate] Diarrhea     Enbrel [Etanercept] Hives and Rash         RECOMMENDATION/PLAN                                                      RECOMMENDED DISPOSITION: Ivonl send high priority note onto GI Clinic- spoke with Clinic RN Aislinn, she will call pt back to discuss symptom and plan of care.  Reviewed when to seek urgent/emergent care, per protocol  Will comply with recommendation: Yes    If further questions/concerns or if symptoms do not improve, worsen or new symptoms develop, call your PCP or [phone] to talk with the Resident on call, as soon as possible.    Guideline used: Abd pain  Telephone Triage Protocols for Nurses, Fifth Edition, Isaura Cagle RN  "

## 2021-05-17 ENCOUNTER — PATIENT OUTREACH (OUTPATIENT)
Dept: NURSING | Facility: CLINIC | Age: 46
End: 2021-05-17
Attending: INTERNAL MEDICINE
Payer: COMMERCIAL

## 2021-05-17 ASSESSMENT — ACTIVITIES OF DAILY LIVING (ADL): DEPENDENT_IADLS:: INDEPENDENT

## 2021-05-17 NOTE — PROGRESS NOTES
Clinic Care Coordination Contact    Clinic Care Coordination Contact  OUTREACH    Referral Information:  Referral Source: IP Report    Primary Diagnosis: Psychosocial    Chief Complaint   Patient presents with     Clinic Care Coordination - Initial     Needs assessment        Universal Utilization:   Clinic Utilization  Difficulty keeping appointments: No  Compliance Concerns: No  No-Show Concerns: No  No PCP office visit in Past Year: No    Utilization    Last refreshed: 5/17/2021  8:41 AM: Hospital Admissions 3           Last refreshed: 5/17/2021  8:41 AM: ED Visits 7           Last refreshed: 5/17/2021  8:41 AM: No Show Count (past year) 2              Current as of: 5/17/2021  8:41 AM              Clinical Concerns:  Current Medical Concerns: Pt was hospitalized at [facility] 5/6/21 to 5/11/21 for ulcerative colitis flare.       Patient Active Problem List   Diagnosis     CARDIOVASCULAR SCREENING; LDL GOAL LESS THAN 160     DUB (dysfunctional uterine bleeding)     Anxiety state     Esophageal reflux     Mild major depression (H)     Mild intermittent asthma without complication     Vision changes     Prothrombin mutation (H)     Metastatic malignant melanoma (H)     Malignant melanoma of left upper extremity including shoulder (H)     Functional diarrhea     Colitis     Rectal bleeding     Bilateral leg cramps     Rash and nonspecific skin eruption     Other chronic pain     Immunosuppressed status (H)     Ulcerative colitis with complication, unspecified location (H)     Morbid obesity (H)     Cervical high risk HPV (human papillomavirus) test positive     STORMY (obstructive sleep apnea)     Secondary lymphedema     Chronic neutrophilia     Diabetes mellitus, iatrogenic (H)     High risk HPV infection     Abdominal pain     Anemia     Candidiasis of mouth     Hypocalcemia     Malaise     Menstrual irregularity     Arthritis, rheumatoid (H)     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 difficile colitis       Current Behavioral Concerns: FMLA/long term disability needs, may need legal assistance      Education Provided to patient: role of  CC and clinic care coordination, AVS review, legal resources      Order: Reason for Referral: Would benefit from ongoing SW follow up. Patient discharging on sliding scale insulin which is new for the patient. The patient has glucometer and supplies but has not had to administer subcutaneous insulin. Currently working on trying to arrange home care RN services and possibly clinic follow up for the patient. The need for subcutaneous insulin is steroid induced. Additional pertinent details: Home discharge. Psychosocial Concerns.    Hospital AVS: Admitted with a flare of ulcerative colitis. You did well with transition to a higher dose of Prednisolone. We will send you home with close outpatient follow-up with the GI doctors. You will continue Metformin and use a sliding scale insulin regimen for blood sugar correction at home.    CHW: Patient noted desire to discuss opportunities for support and resources.      CC outreach to pt for initial assessment per CHW scheduling. Pt is working with  home care RN/PT. Pt is new to insulin, seems to be connected to diabetes ed and gastro team per chart.     INSULIN SLIDING SCALE DOSING  Do Not give Correction Insulin if Pre-Meal BG less than 140.  For Pre-Meal  - 189 give 1 unit.  For Pre-Meal  - 239 give 2 units.  For Pre-Meal  - 289 give 3 units.  For Pre-Meal  - 339 give 4 units.  For Pre-Meal - 399 give 5 units.  For Pre-Meal -449 give 6 units  For Pre-Meal BG greater than or equal to 450 give 7 units.  To be given with prandial insulin, and based on pre-meal  blood glucose.  Notify provider if  glucose greater than or equal to 350 mg/dL  after administration of correction dose.    Pt reported she is not working. Nurse is coming 2x/week. Pt's employer says she no longer qualifies for FMLA. Pt talked to long term disability agency today - sending forms to complete. Advised should send to gastro provider. Pt took call from gastro clinic while on phone with CC, got fax #. Pt will send forms there.      Pt reported she thinks her job is trying to get rid of her - already used FMLA last year, short 120 hours of time to get it back. Noted she was on long term disability last year. CC advised to call  - [facility]  ([phone]).     CC gave gastro clinic address and phone #[phone]. Pt has not seen them yet - 6/16/21 appt.   - [facility]: [phone]  - Fax# [phone]    Pain  Pain: Yes    Health Maintenance Reviewed: Due/Overdue     Health Maintenance Due   Topic Date Due     DIABETIC FOOT EXAM  Never done     URINE DRUG SCREEN  Never done     ADVANCE CARE PLANNING  Never done     Pneumococcal Vaccine: Pediatrics (0 to 5 Years) and At-Risk Patients (6 to 64 Years) (1 of 4 - PCV13) Never done     HIV SCREENING  Never done     HEPATITIS B IMMUNIZATION (1 of 3 - Risk 3-dose series) Never done     PREVENTIVE CARE VISIT  03/02/2016     MAMMO SCREENING  10/04/2018     EYE EXAM  10/12/2018     ASTHMA ACTION PLAN  01/28/2020     COLPOSCOPY  Never done     HPV TEST  12/13/2020     PAP  12/13/2020     MICROALBUMIN  06/04/2021       Clinical Pathway: None    Medication Management:  No questions today.      START taking:  Alcohol Prep  blood glucose (NO BRAND SPECIFIED)  blood glucose monitoring (NO BRAND SPECIFIED)  hydrocortisone (CORTENEMA)  insulin lispro (HumaLOG KWIKPEN)  oxyCODONE (ROXICODONE)    CHANGE how you take:  Accu-Chek Guide  blood glucose (NO BRAND SPECIFIED)  predniSONE (DELTASONE)    STOP taking:  hydrocortisone-pramoxine rectal  foam (PROCTOFOAM-HC)    Post-discharge  medication reconciliation status: Discharge medications reviewed and reconciled.  Changed medications per note/orders (see AVS). Inpatient team updated medication list accordingly.     Functional Status:  Dependent ADLs: Independent  Dependent IADLs: Independent  Bed or wheelchair confined: No  Mobility Status: Independent  Fallen 2 or more times in the past year?: No  Any fall with injury in the past year?: No    Living Situation:  Current living arrangement: I live in a private home  [facility]    Lifestyle & Psychosocial Needs:  Diet: Diabetic diet  Inadequate nutrition: No  Tube Feeding: No  Inadequate activity/exercise: No  Significant changes in sleep pattern: No  Transportation means: Regular car     Mandaen or spiritual beliefs that impact treatment: No  Mental health DX: Yes  Mental health DX how managed: Medication  Mental health management concern: No  Chemical Dependency Status: No Current Concerns  Informal Support system: Family     Socioeconomic History     Marital status:      Spouse name: Not on file     Number of children: Not on file     Years of education: Not on file     Highest education level: Not on file     Tobacco Use     Smoking status: Former Smoker     Packs/day: 1.00     Years: 5.00     Pack years: 5.00     Quit date: 3/20/1998     Years since quittin.1     Smokeless tobacco: Never Used   Substance and Sexual Activity     Alcohol use: Yes     Comment: occ     Drug use: No     Sexual activity: Not Currently     Partners: Male     Birth control/protection: Surgical       Care Coordinator has reviewed patient's Social Determinants of Health (SDoH) on this date. Upon review, changes were not made.      Resources and Interventions:  Current Resources:   Skilled Home Care Services: Skilled Nursing, Physicial Therapy  Community Resources: Home Care, Financial/Insurance  Supplies used at home: None  Equipment Currently Used at Home: none  Employment  Status: employed full-time    Advance Care Plan/Directive  Advanced Care Plans/Directives on file: No    Referrals Placed: Other, Specialty Providers ()     Patient/Caregiver understanding: Pt reports understanding and denies any additional questions or concerns at this times. MIGUEL CC engaged in AIDET communication during encounter.     Future Appointments              In 1 week ROOM 2 [facility]    In 1 month Renard Davey MD [facility]    In 1 month ROOM 7 [facility]    In 2 months Lowell Bullock MD [facility]    In 3 months ROOM 3 [facility]          Plan: No further outreaches will be made at this time unless a new referral is made or a change in the pt's status occurs.     Patient was provided with this writer's contact information and encouraged to call with any questions or concerns.     MIGUEL CC will send care coordination introduction letter to patient.    DELIA Barrett   Social Work Clinic Care Coordinator   [facility]  [phone]  [email or web address]

## 2021-05-17 NOTE — LETTER
[facility] CARE COORDINATION  [facility]  [address] [place] [address]    May 17, 2021    Doretha Fernandez  [address] [place] [address]      Dear Doretha,    I am a clinic care coordinator who works with Anna Naidu MD at [facility]. I wanted to thank you for spending the time to talk with me.  Below is a description of clinic care coordination and how I can further assist you.      The clinic care coordination team is made up of a registered nurse,  and community health worker who understand the health care system. The goal of clinic care coordination is to help you manage your health and improve access to the health care system in the most efficient manner. The team can assist you in meeting your health care goals by providing education, coordinating services, strengthening the communication among your providers and supporting you with any resource needs.    Please feel free to contact the Community Health Worker at [phone] with any questions or concerns. We are focused on providing you with the highest-quality healthcare experience possible and that all starts with you.     Sincerely,     DELIA Barrett   Social Work Clinic Care Coordinator   [facility]  [phone]  [email or web address]

## 2021-05-17 NOTE — TELEPHONE ENCOUNTER
Patient states she is doing a little better. No blood in stool.  2 stools today .  Will taper to prednisone 50 mg on Wednesday. Next infusion in 9 days. Needs to have her employer fax forms for leave.  Pt has the fax number. Will call if any questions or symptoms escalate.

## 2021-05-24 ENCOUNTER — TRANSFERRED RECORDS (OUTPATIENT)
Dept: HEALTH INFORMATION MANAGEMENT | Facility: CLINIC | Age: 46
End: 2021-05-24

## 2021-05-24 ENCOUNTER — RECORDS - HEALTHEAST (OUTPATIENT)
Dept: ADMINISTRATIVE | Facility: CLINIC | Age: 46
End: 2021-05-24

## 2021-05-25 ENCOUNTER — MEDICAL CORRESPONDENCE (OUTPATIENT)
Dept: HEALTH INFORMATION MANAGEMENT | Facility: CLINIC | Age: 46
End: 2021-05-25

## 2021-05-25 ENCOUNTER — PATIENT OUTREACH (OUTPATIENT)
Dept: GASTROENTEROLOGY | Facility: CLINIC | Age: 46
End: 2021-05-25

## 2021-05-25 RX ORDER — METHYLPREDNISOLONE SODIUM SUCCINATE 40 MG/ML
40 INJECTION, POWDER, LYOPHILIZED, FOR SOLUTION INTRAMUSCULAR; INTRAVENOUS ONCE
Status: CANCELLED
Start: 2021-05-26 | End: 2021-05-26

## 2021-05-25 NOTE — PROGRESS NOTES
Patient reports that she had pet scan yesterday at [facility] and no evidence of myeloma   That Dr. Giron would be okay with Remicade  Patient said that she is not feeling better and wondering if she should switch to Remicade    Discussed that she has only had one dose of entyvio  and second one tomrrow  Still in the induction dose  Normally do not switch therapy without giving vedo a try through the induction phase.   Prednisone 50 mg today and scheduled to drop to 40mg tomorrow        States 10 stools on Sunday  Mucus only on Monday and today 3 bloody stools   Patient thinks she has hemorrhoids  States uncomfortable and not using rectal therapy due to the pain   Plan take 50mg prednisone again tomorrow  Second dose of entyvio   Update Dr. Davey

## 2021-05-25 NOTE — PROGRESS NOTES
Added methylprednisolone to therapy plan  Left a message outlining the message from Dr. Davey and he will touch base after her labs adilson daniels

## 2021-05-25 NOTE — PROGRESS NOTES
Thanks for update.    Let's see how infusion goes tomorrow and what labs look like (CRP, ESR, CBC, LFTs, BMP).    Let's also give her some IV methylpred 40 mg with infusion.    Regarding infliximab (Remicade). Prior to this we were told her oncologist was not ok with this.    She would require at least 3 doses of infliixmab for induction. She is also at risk for having antibodies to this given her prior exposure. We could consider this but as outlined previously we had agreed that if vedolizumab fails to proceed with colectomy. Even her IBD doc at [place] had agreed this was the best course of action.    We will see how infusion goes tomorrow and response over the next 1-2 weeks.    Recommend cortenemas as able (hopefully she can do every other day) to see if this continues to help with tenesmus).    Renard Davey MD

## 2021-05-26 ENCOUNTER — INFUSION THERAPY VISIT (OUTPATIENT)
Dept: INFUSION THERAPY | Facility: CLINIC | Age: 46
End: 2021-05-26
Attending: INTERNAL MEDICINE
Payer: COMMERCIAL

## 2021-05-26 ENCOUNTER — HOSPITAL ENCOUNTER (OUTPATIENT)
Dept: LAB | Facility: CLINIC | Age: 46
Discharge: HOME | End: 2021-05-26
Attending: INTERNAL MEDICINE | Admitting: INTERNAL MEDICINE
Payer: COMMERCIAL

## 2021-05-26 VITALS
TEMPERATURE: 97.5 F | DIASTOLIC BLOOD PRESSURE: 65 MMHG | SYSTOLIC BLOOD PRESSURE: 114 MMHG | RESPIRATION RATE: 18 BRPM | HEART RATE: 92 BPM

## 2021-05-26 DIAGNOSIS — C43.9 METASTATIC MALIGNANT MELANOMA (H): ICD-10-CM

## 2021-05-26 DIAGNOSIS — K52.9 COLITIS: ICD-10-CM

## 2021-05-26 DIAGNOSIS — K51.919 ULCERATIVE COLITIS WITH COMPLICATION, UNSPECIFIED LOCATION (H): Primary | ICD-10-CM

## 2021-05-26 LAB
ALBUMIN SERPL-MCNC: 3.8 G/DL (ref 3.4–5)
ALP SERPL-CCNC: 52 U/L (ref 40–150)
ALT SERPL W P-5'-P-CCNC: 75 U/L (ref 0–50)
AST SERPL W P-5'-P-CCNC: 14 U/L (ref 0–45)
BASOPHILS # BLD AUTO: 0.2 10E9/L (ref 0–0.2)
BASOPHILS NFR BLD AUTO: 1 %
BILIRUB DIRECT SERPL-MCNC: <0.1 MG/DL (ref 0–0.2)
BILIRUB SERPL-MCNC: 0.4 MG/DL (ref 0.2–1.3)
CRP SERPL-MCNC: 6 MG/L (ref 0–8)
DIFFERENTIAL METHOD BLD: ABNORMAL
EOSINOPHIL # BLD AUTO: 0.1 10E9/L (ref 0–0.7)
EOSINOPHIL NFR BLD AUTO: 0.8 %
ERYTHROCYTE [DISTWIDTH] IN BLOOD BY AUTOMATED COUNT: 13.2 % (ref 10–15)
ERYTHROCYTE [SEDIMENTATION RATE] IN BLOOD BY WESTERGREN METHOD: 5 MM/H (ref 0–20)
HCT VFR BLD AUTO: 41.6 % (ref 35–47)
HGB BLD-MCNC: 13.4 G/DL (ref 11.7–15.7)
IMM GRANULOCYTES # BLD: 0.5 10E9/L (ref 0–0.4)
IMM GRANULOCYTES NFR BLD: 3.2 %
LYMPHOCYTES # BLD AUTO: 4.8 10E9/L (ref 0.8–5.3)
LYMPHOCYTES NFR BLD AUTO: 31 %
MCH RBC QN AUTO: 30.7 PG (ref 26.5–33)
MCHC RBC AUTO-ENTMCNC: 32.2 G/DL (ref 31.5–36.5)
MCV RBC AUTO: 95 FL (ref 78–100)
MONOCYTES # BLD AUTO: 1.1 10E9/L (ref 0–1.3)
MONOCYTES NFR BLD AUTO: 7.2 %
NEUTROPHILS # BLD AUTO: 8.8 10E9/L (ref 1.6–8.3)
NEUTROPHILS NFR BLD AUTO: 56.8 %
NRBC # BLD AUTO: 0 10*3/UL
NRBC BLD AUTO-RTO: 0 /100
PLATELET # BLD AUTO: 258 10E9/L (ref 150–450)
PLATELET # BLD EST: ABNORMAL 10*3/UL
PROT SERPL-MCNC: 6.8 G/DL (ref 6.8–8.8)
RBC # BLD AUTO: 4.37 10E12/L (ref 3.8–5.2)
RBC MORPH BLD: NORMAL
WBC # BLD AUTO: 15.4 10E9/L (ref 4–11)

## 2021-05-26 PROCEDURE — 85025 COMPLETE CBC W/AUTO DIFF WBC: CPT | Performed by: INTERNAL MEDICINE

## 2021-05-26 PROCEDURE — 36415 COLL VENOUS BLD VENIPUNCTURE: CPT | Performed by: INTERNAL MEDICINE

## 2021-05-26 PROCEDURE — 250N000011 HC RX IP 250 OP 636: Performed by: INTERNAL MEDICINE

## 2021-05-26 PROCEDURE — 96375 TX/PRO/DX INJ NEW DRUG ADDON: CPT

## 2021-05-26 PROCEDURE — 258N000003 HC RX IP 258 OP 636: Performed by: INTERNAL MEDICINE

## 2021-05-26 PROCEDURE — 80076 HEPATIC FUNCTION PANEL: CPT | Performed by: INTERNAL MEDICINE

## 2021-05-26 PROCEDURE — 86140 C-REACTIVE PROTEIN: CPT | Performed by: INTERNAL MEDICINE

## 2021-05-26 PROCEDURE — 85652 RBC SED RATE AUTOMATED: CPT | Performed by: INTERNAL MEDICINE

## 2021-05-26 PROCEDURE — 96365 THER/PROPH/DIAG IV INF INIT: CPT

## 2021-05-26 RX ORDER — METHYLPREDNISOLONE SODIUM SUCCINATE 40 MG/ML
40 INJECTION, POWDER, LYOPHILIZED, FOR SOLUTION INTRAMUSCULAR; INTRAVENOUS ONCE
Status: CANCELLED
Start: 2021-06-23 | End: 2021-06-23

## 2021-05-26 RX ORDER — METHYLPREDNISOLONE SODIUM SUCCINATE 40 MG/ML
40 INJECTION, POWDER, LYOPHILIZED, FOR SOLUTION INTRAMUSCULAR; INTRAVENOUS ONCE
Status: COMPLETED | OUTPATIENT
Start: 2021-05-26 | End: 2021-05-26

## 2021-05-26 RX ADMIN — VEDOLIZUMAB 300 MG: 300 INJECTION, POWDER, LYOPHILIZED, FOR SOLUTION INTRAVENOUS at 14:45

## 2021-05-26 RX ADMIN — SODIUM CHLORIDE 250 ML: 9 INJECTION, SOLUTION INTRAVENOUS at 14:34

## 2021-05-26 RX ADMIN — METHYLPREDNISOLONE SODIUM SUCCINATE 40 MG: 40 INJECTION, POWDER, FOR SOLUTION INTRAMUSCULAR; INTRAVENOUS at 14:33

## 2021-05-26 NOTE — PROGRESS NOTES
Infusion Nursing Note:  Doretha Fernandez presents today for Entyvio.    Patient seen by provider today: No   present during visit today: Not Applicable.    Note: N/A.    Intravenous Access:  Peripheral IV placed after 10 attempts.    Treatment Conditions:  Biological Infusion Checklist:  ~~~ NOTE: If the patient answers yes to any of the questions below, hold the infusion and contact ordering provider or on-call provider.    1. Have you recently had an elevated temperature, fever, chills, productive cough, coughing for 3 weeks or longer or hemoptysis, abnormal vital signs, night sweats,  chest pain or have you noticed a decrease in your appetite, unexplained weight loss or fatigue? No  2. Do you have any open wounds or new incisions? No  3. Do you have any recent or upcoming hospitalizations, surgeries or dental procedures? No  4. Do you currently have or recently have had any signs of illness or infection or are you on any antibiotics? Yes, MD aware; he prescribed it  5. Have you had any new, sudden or worsening abdominal pain? No  6. Have you or anyone in your household received a live vaccination in the past 4 weeks? Please note:  No live vaccines while on biologic/chemotherapy until 6 months after the last treatment.  Patient can receive the flu vaccine (shot only) and the pneumovax.  It is optimal for the patient to get these vaccines mid cycle, but they can be given at any time as long as it is not on the day of the infusion. No  7. Have you recently been diagnosed with any new nervous system diseases (ie. Multiple sclerosis, Guillain Doucette, seizures, neurological changes) or cancer diagnosis? No  8. Are you on any form of radiation or chemotherapy? No  9. Are you pregnant or breast feeding or do you have plans of pregnancy in the future? No  10. Have you been having any signs of worsening depression or suicidal ideations?  (benlysta only) No  11. Have there been any other new onset medical symptoms?  No  Post Infusion Assessment:  Patient tolerated infusion without incident.  Blood return noted pre and post infusion.  Site patent and intact, free from redness, edema or discomfort.  No evidence of extravasations.  Access discontinued per protocol.     Discharge Plan:   Discharge instructions reviewed with: Patient.  Patient and/or family verbalized understanding of discharge instructions and all questions answered.  AVS to patient via [employer].  Patient will return 6/23/2021 for next appointment.   Patient discharged in stable condition accompanied by: self.  Departure Mode: Ambulatory.    Chantel Cook RN

## 2021-05-27 ENCOUNTER — RECORDS - HEALTHEAST (OUTPATIENT)
Dept: ADMINISTRATIVE | Facility: CLINIC | Age: 46
End: 2021-05-27

## 2021-05-28 ENCOUNTER — RECORDS - HEALTHEAST (OUTPATIENT)
Dept: ADMINISTRATIVE | Facility: CLINIC | Age: 46
End: 2021-05-28

## 2021-05-29 ENCOUNTER — RECORDS - HEALTHEAST (OUTPATIENT)
Dept: ADMINISTRATIVE | Facility: CLINIC | Age: 46
End: 2021-05-29

## 2021-05-30 ENCOUNTER — RECORDS - HEALTHEAST (OUTPATIENT)
Dept: ADMINISTRATIVE | Facility: CLINIC | Age: 46
End: 2021-05-30

## 2021-06-01 ENCOUNTER — PATIENT OUTREACH (OUTPATIENT)
Dept: GASTROENTEROLOGY | Facility: CLINIC | Age: 46
End: 2021-06-01

## 2021-06-01 DIAGNOSIS — E13.9 DIABETES MELLITUS, IATROGENIC (H): Primary | Chronic | ICD-10-CM

## 2021-06-01 NOTE — PROGRESS NOTES
Called pt to discuss. Having 5-10 BMs daily. Range from all blood to bloody/green.    Does not feel significantly better since discharge 3 weeks ago. Has now had 2 vedo infusions. On pred 40 mg (set to go downto 30 mg soon).    Feels swollen.    We discussed options:  1. Continue vedolizumab and prednisone taper  2. Trial infliximab infusions (to induce remission) and then go back to vedolizumab for maintenance. I have discussed this with her primary oncologist at [place] and he is in favor of this. He states risk of this inducing recurrence of melanoma is very low. She does have exposure to one dose of infliximab a couple of years ago so there is a risk she has antibodies.  3. Total colectomy.    Patient does not think monotherapy with vedolizumab is working.    She wants to think more of inliximab vs colectomy. She wants to talk to her dad.    Either way I think given her stagnation we should admit to help expedite either infliximab infusion or colectomy. And also to help get blood sugars under control.    Renard Davey MD

## 2021-06-01 NOTE — PROGRESS NOTES
Discussed case with admitting hospitalist and with patient again.    Her symptoms are not great but are stable.    She would like more time to consider infliximab vs colectomy. She will discuss with her father.    Will hold off on admission for now.    I will discuss with IBD colleagues about their thoughts on trial of inlfiximab induction.    Will touch base tomorrow. Will then decide on trial of infliximab (inpatient vs outpatient) vs colectomy.    Patient is in agreement. All questions answered.    Renard Davey MD

## 2021-06-01 NOTE — PROGRESS NOTES
Discussed with Dr. Davey and due to concerns of elevated blood sugar, ongoing multiple loose stools and blood and mucus, along with swollen joints and high doses of steroids    Patient informed  Contacted [facility] direct admit to start the process.

## 2021-06-01 NOTE — PROGRESS NOTES
Returned message patient left over the holiday weekend.     Patient reports that Saturday states a lot of stools then said about 10   Sunday just blood and mucus  Monday about 10 again  States worked about dropping down to 30 mg tomorrow  Then states that her face and neck are swollen along with her feet and toes  Elevated blood sugar  Was talking then disconnected.     Route to Dr. Davey

## 2021-06-02 DIAGNOSIS — G47.00 PERSISTENT INSOMNIA: Primary | ICD-10-CM

## 2021-06-02 DIAGNOSIS — G47.33 OBSTRUCTIVE SLEEP APNEA: ICD-10-CM

## 2021-06-03 ENCOUNTER — APPOINTMENT (OUTPATIENT)
Dept: CT IMAGING | Facility: CLINIC | Age: 46
End: 2021-06-03
Attending: EMERGENCY MEDICINE
Payer: COMMERCIAL

## 2021-06-03 ENCOUNTER — NURSE TRIAGE (OUTPATIENT)
Dept: NURSING | Facility: CLINIC | Age: 46
End: 2021-06-03

## 2021-06-03 ENCOUNTER — PATIENT OUTREACH (OUTPATIENT)
Dept: SURGERY | Facility: CLINIC | Age: 46
End: 2021-06-03

## 2021-06-03 ENCOUNTER — HOSPITAL ENCOUNTER (INPATIENT)
Facility: CLINIC | Age: 46
LOS: 6 days | Discharge: HOME | End: 2021-06-09
Attending: EMERGENCY MEDICINE | Admitting: INTERNAL MEDICINE
Payer: COMMERCIAL

## 2021-06-03 DIAGNOSIS — K21.9 GASTROESOPHAGEAL REFLUX DISEASE WITHOUT ESOPHAGITIS: Chronic | ICD-10-CM

## 2021-06-03 DIAGNOSIS — I10 HYPERTENSION, UNSPECIFIED TYPE: ICD-10-CM

## 2021-06-03 DIAGNOSIS — G47.00 PERSISTENT INSOMNIA: ICD-10-CM

## 2021-06-03 DIAGNOSIS — Z20.822 CONTACT WITH AND (SUSPECTED) EXPOSURE TO COVID-19: ICD-10-CM

## 2021-06-03 DIAGNOSIS — E09.9 STEROID-INDUCED DIABETES MELLITUS, SEQUELA (H): ICD-10-CM

## 2021-06-03 DIAGNOSIS — E13.9 DIABETES MELLITUS, IATROGENIC (H): Chronic | ICD-10-CM

## 2021-06-03 DIAGNOSIS — T38.0X5S STEROID-INDUCED DIABETES MELLITUS, SEQUELA (H): ICD-10-CM

## 2021-06-03 DIAGNOSIS — G47.33 OSA (OBSTRUCTIVE SLEEP APNEA): Chronic | ICD-10-CM

## 2021-06-03 DIAGNOSIS — Z29.9 PREVENTIVE MEASURE: ICD-10-CM

## 2021-06-03 DIAGNOSIS — K51.90 ULCERATIVE COLITIS WITHOUT COMPLICATIONS, UNSPECIFIED LOCATION (H): Primary | ICD-10-CM

## 2021-06-03 DIAGNOSIS — E66.01 MORBID OBESITY (H): Chronic | ICD-10-CM

## 2021-06-03 LAB
ALBUMIN SERPL-MCNC: 3.5 G/DL (ref 3.4–5)
ALBUMIN UR-MCNC: 10 MG/DL
ALP SERPL-CCNC: 53 U/L (ref 40–150)
ALT SERPL W P-5'-P-CCNC: 49 U/L (ref 0–50)
ANION GAP SERPL CALCULATED.3IONS-SCNC: 6 MMOL/L (ref 3–14)
APPEARANCE UR: CLEAR
AST SERPL W P-5'-P-CCNC: 10 U/L (ref 0–45)
BASOPHILS # BLD AUTO: 0.1 10E9/L (ref 0–0.2)
BASOPHILS NFR BLD AUTO: 0.9 %
BILIRUB SERPL-MCNC: 0.2 MG/DL (ref 0.2–1.3)
BILIRUB UR QL STRIP: NEGATIVE
BUN SERPL-MCNC: 21 MG/DL (ref 7–30)
CALCIUM SERPL-MCNC: 9.2 MG/DL (ref 8.5–10.1)
CHLORIDE SERPL-SCNC: 105 MMOL/L (ref 94–109)
CO2 SERPL-SCNC: 28 MMOL/L (ref 20–32)
COLOR UR AUTO: ABNORMAL
CREAT SERPL-MCNC: 0.73 MG/DL (ref 0.52–1.04)
CRP SERPL-MCNC: 25 MG/L (ref 0–8)
DIFFERENTIAL METHOD BLD: ABNORMAL
EOSINOPHIL # BLD AUTO: 0 10E9/L (ref 0–0.7)
EOSINOPHIL NFR BLD AUTO: 0 %
ERYTHROCYTE [DISTWIDTH] IN BLOOD BY AUTOMATED COUNT: 13.4 % (ref 10–15)
ERYTHROCYTE [SEDIMENTATION RATE] IN BLOOD BY WESTERGREN METHOD: 9 MM/H (ref 0–20)
GFR SERPL CREATININE-BSD FRML MDRD: >90 ML/MIN/{1.73_M2}
GLUCOSE BLDC GLUCOMTR-MCNC: 125 MG/DL (ref 70–99)
GLUCOSE SERPL-MCNC: 116 MG/DL (ref 70–99)
GLUCOSE UR STRIP-MCNC: NEGATIVE MG/DL
HCT VFR BLD AUTO: 41 % (ref 35–47)
HGB BLD-MCNC: 13 G/DL (ref 11.7–15.7)
HGB UR QL STRIP: ABNORMAL
KETONES UR STRIP-MCNC: NEGATIVE MG/DL
LABORATORY COMMENT REPORT: NORMAL
LEUKOCYTE ESTERASE UR QL STRIP: ABNORMAL
LIPASE SERPL-CCNC: 53 U/L (ref 73–393)
LYMPHOCYTES # BLD AUTO: 4.5 10E9/L (ref 0.8–5.3)
LYMPHOCYTES NFR BLD AUTO: 36.6 %
MCH RBC QN AUTO: 30.6 PG (ref 26.5–33)
MCHC RBC AUTO-ENTMCNC: 31.7 G/DL (ref 31.5–36.5)
MCV RBC AUTO: 97 FL (ref 78–100)
MONOCYTES # BLD AUTO: 0.9 10E9/L (ref 0–1.3)
MONOCYTES NFR BLD AUTO: 7.1 %
MUCOUS THREADS #/AREA URNS LPF: PRESENT /LPF
NEUTROPHILS # BLD AUTO: 6.6 10E9/L (ref 1.6–8.3)
NEUTROPHILS NFR BLD AUTO: 53.6 %
NITRATE UR QL: NEGATIVE
NRBC # BLD AUTO: 0.1 10*3/UL
NRBC BLD AUTO-RTO: 1 /100
PH UR STRIP: 5.5 PH (ref 5–7)
PLATELET # BLD AUTO: 257 10E9/L (ref 150–450)
PLATELET # BLD EST: ABNORMAL 10*3/UL
POTASSIUM SERPL-SCNC: 3.6 MMOL/L (ref 3.4–5.3)
PROMYELOCYTES # BLD MANUAL: 0.2 10E9/L
PROMYELOCYTES NFR BLD MANUAL: 1.8 %
PROT SERPL-MCNC: 6.7 G/DL (ref 6.8–8.8)
RBC # BLD AUTO: 4.25 10E12/L (ref 3.8–5.2)
RBC #/AREA URNS AUTO: 3 /HPF (ref 0–2)
RBC MORPH BLD: NORMAL
SARS-COV-2 RNA RESP QL NAA+PROBE: NEGATIVE
SODIUM SERPL-SCNC: 139 MMOL/L (ref 133–144)
SOURCE: ABNORMAL
SP GR UR STRIP: 1.02 (ref 1–1.03)
SPECIMEN SOURCE: NORMAL
SQUAMOUS #/AREA URNS AUTO: 10 /HPF (ref 0–1)
UROBILINOGEN UR STRIP-MCNC: NORMAL MG/DL (ref 0–2)
WBC # BLD AUTO: 12.3 10E9/L (ref 4–11)
WBC #/AREA URNS AUTO: 3 /HPF (ref 0–5)

## 2021-06-03 PROCEDURE — 85652 RBC SED RATE AUTOMATED: CPT | Performed by: INTERNAL MEDICINE

## 2021-06-03 PROCEDURE — 83690 ASSAY OF LIPASE: CPT | Performed by: EMERGENCY MEDICINE

## 2021-06-03 PROCEDURE — 74177 CT ABD & PELVIS W/CONTRAST: CPT

## 2021-06-03 PROCEDURE — 87086 URINE CULTURE/COLONY COUNT: CPT | Performed by: EMERGENCY MEDICINE

## 2021-06-03 PROCEDURE — 258N000003 HC RX IP 258 OP 636: Performed by: EMERGENCY MEDICINE

## 2021-06-03 PROCEDURE — 250N000011 HC RX IP 250 OP 636: Performed by: EMERGENCY MEDICINE

## 2021-06-03 PROCEDURE — 250N000011 HC RX IP 250 OP 636: Performed by: RADIOLOGY

## 2021-06-03 PROCEDURE — 250N000013 HC RX MED GY IP 250 OP 250 PS 637: Performed by: STUDENT IN AN ORGANIZED HEALTH CARE EDUCATION/TRAINING PROGRAM

## 2021-06-03 PROCEDURE — 120N000002 HC R&B MED SURG/OB UMMC

## 2021-06-03 PROCEDURE — 96375 TX/PRO/DX INJ NEW DRUG ADDON: CPT | Performed by: EMERGENCY MEDICINE

## 2021-06-03 PROCEDURE — 96361 HYDRATE IV INFUSION ADD-ON: CPT | Performed by: EMERGENCY MEDICINE

## 2021-06-03 PROCEDURE — 86140 C-REACTIVE PROTEIN: CPT | Performed by: INTERNAL MEDICINE

## 2021-06-03 PROCEDURE — 80053 COMPREHEN METABOLIC PANEL: CPT | Performed by: EMERGENCY MEDICINE

## 2021-06-03 PROCEDURE — 96374 THER/PROPH/DIAG INJ IV PUSH: CPT | Mod: 59 | Performed by: EMERGENCY MEDICINE

## 2021-06-03 PROCEDURE — U0003 INFECTIOUS AGENT DETECTION BY NUCLEIC ACID (DNA OR RNA); SEVERE ACUTE RESPIRATORY SYNDROME CORONAVIRUS 2 (SARS-COV-2) (CORONAVIRUS DISEASE [COVID-19]), AMPLIFIED PROBE TECHNIQUE, MAKING USE OF HIGH THROUGHPUT TECHNOLOGIES AS DESCRIBED BY CMS-2020-01-R: HCPCS | Performed by: EMERGENCY MEDICINE

## 2021-06-03 PROCEDURE — U0005 INFEC AGEN DETEC AMPLI PROBE: HCPCS | Performed by: EMERGENCY MEDICINE

## 2021-06-03 PROCEDURE — 250N000011 HC RX IP 250 OP 636: Performed by: STUDENT IN AN ORGANIZED HEALTH CARE EDUCATION/TRAINING PROGRAM

## 2021-06-03 PROCEDURE — 81001 URINALYSIS AUTO W/SCOPE: CPT | Performed by: EMERGENCY MEDICINE

## 2021-06-03 PROCEDURE — C9803 HOPD COVID-19 SPEC COLLECT: HCPCS | Performed by: EMERGENCY MEDICINE

## 2021-06-03 PROCEDURE — 85025 COMPLETE CBC W/AUTO DIFF WBC: CPT | Performed by: EMERGENCY MEDICINE

## 2021-06-03 PROCEDURE — 250N000013 HC RX MED GY IP 250 OP 250 PS 637: Performed by: EMERGENCY MEDICINE

## 2021-06-03 PROCEDURE — 99285 EMERGENCY DEPT VISIT HI MDM: CPT | Mod: 25 | Performed by: EMERGENCY MEDICINE

## 2021-06-03 PROCEDURE — 999N001017 HC STATISTIC GLUCOSE BY METER IP

## 2021-06-03 PROCEDURE — 74177 CT ABD & PELVIS W/CONTRAST: CPT | Mod: 26 | Performed by: RADIOLOGY

## 2021-06-03 PROCEDURE — 99223 1ST HOSP IP/OBS HIGH 75: CPT | Mod: AI | Performed by: INTERNAL MEDICINE

## 2021-06-03 PROCEDURE — 99285 EMERGENCY DEPT VISIT HI MDM: CPT | Performed by: EMERGENCY MEDICINE

## 2021-06-03 RX ORDER — INSULIN LISPRO 100 [IU]/ML
1-7 INJECTION, SOLUTION INTRAVENOUS; SUBCUTANEOUS
Status: DISCONTINUED | OUTPATIENT
Start: 2021-06-03 | End: 2021-06-03

## 2021-06-03 RX ORDER — CALCIUM CARBONATE 500 MG/1
500 TABLET, CHEWABLE ORAL 3 TIMES DAILY PRN
Status: DISCONTINUED | OUTPATIENT
Start: 2021-06-03 | End: 2021-06-09 | Stop reason: HOSPADM

## 2021-06-03 RX ORDER — IOPAMIDOL 755 MG/ML
135 INJECTION, SOLUTION INTRAVASCULAR ONCE
Status: COMPLETED | OUTPATIENT
Start: 2021-06-03 | End: 2021-06-03

## 2021-06-03 RX ORDER — HYDROXYZINE HYDROCHLORIDE 25 MG/1
25 TABLET, FILM COATED ORAL 3 TIMES DAILY PRN
Status: DISCONTINUED | OUTPATIENT
Start: 2021-06-03 | End: 2021-06-09 | Stop reason: HOSPADM

## 2021-06-03 RX ORDER — HYDROMORPHONE HYDROCHLORIDE 1 MG/ML
0.5 INJECTION, SOLUTION INTRAMUSCULAR; INTRAVENOUS; SUBCUTANEOUS ONCE
Status: COMPLETED | OUTPATIENT
Start: 2021-06-03 | End: 2021-06-03

## 2021-06-03 RX ORDER — VENLAFAXINE HYDROCHLORIDE 225 MG/1
225 TABLET, EXTENDED RELEASE ORAL DAILY
Status: DISCONTINUED | OUTPATIENT
Start: 2021-06-04 | End: 2021-06-09 | Stop reason: HOSPADM

## 2021-06-03 RX ORDER — LIDOCAINE 40 MG/G
CREAM TOPICAL
Status: DISCONTINUED | OUTPATIENT
Start: 2021-06-03 | End: 2021-06-09 | Stop reason: HOSPADM

## 2021-06-03 RX ORDER — ROSUVASTATIN CALCIUM 10 MG/1
10 TABLET, COATED ORAL DAILY
Status: DISCONTINUED | OUTPATIENT
Start: 2021-06-04 | End: 2021-06-09 | Stop reason: HOSPADM

## 2021-06-03 RX ORDER — OXYCODONE HYDROCHLORIDE 5 MG/1
5 TABLET ORAL ONCE
Status: COMPLETED | OUTPATIENT
Start: 2021-06-03 | End: 2021-06-03

## 2021-06-03 RX ORDER — GABAPENTIN 300 MG/1
900 CAPSULE ORAL 4 TIMES DAILY
Status: DISCONTINUED | OUTPATIENT
Start: 2021-06-03 | End: 2021-06-09 | Stop reason: HOSPADM

## 2021-06-03 RX ORDER — ACETAMINOPHEN 500 MG
1000 TABLET ORAL EVERY 8 HOURS PRN
Status: DISCONTINUED | OUTPATIENT
Start: 2021-06-03 | End: 2021-06-09

## 2021-06-03 RX ORDER — HYDROMORPHONE HYDROCHLORIDE 1 MG/ML
0.5 INJECTION, SOLUTION INTRAMUSCULAR; INTRAVENOUS; SUBCUTANEOUS
Status: DISCONTINUED | OUTPATIENT
Start: 2021-06-03 | End: 2021-06-08

## 2021-06-03 RX ORDER — PEN NEEDLE, DIABETIC 32GX 5/32"
NEEDLE, DISPOSABLE MISCELLANEOUS
Qty: 100 EACH | Refills: 0 | Status: ON HOLD | OUTPATIENT
Start: 2021-06-03 | End: 2021-06-18

## 2021-06-03 RX ORDER — PANTOPRAZOLE SODIUM 40 MG/1
40 TABLET, DELAYED RELEASE ORAL
Status: DISCONTINUED | OUTPATIENT
Start: 2021-06-04 | End: 2021-06-09 | Stop reason: HOSPADM

## 2021-06-03 RX ORDER — ONDANSETRON 8 MG/1
8 TABLET, FILM COATED ORAL EVERY 8 HOURS PRN
Status: ON HOLD | COMMUNITY
End: 2021-06-18

## 2021-06-03 RX ORDER — NICOTINE POLACRILEX 4 MG
15-30 LOZENGE BUCCAL
Status: DISCONTINUED | OUTPATIENT
Start: 2021-06-03 | End: 2021-06-09 | Stop reason: HOSPADM

## 2021-06-03 RX ORDER — DICYCLOMINE HCL 20 MG
20 TABLET ORAL
Status: DISCONTINUED | OUTPATIENT
Start: 2021-06-03 | End: 2021-06-09 | Stop reason: HOSPADM

## 2021-06-03 RX ORDER — SULFAMETHOXAZOLE/TRIMETHOPRIM 800-160 MG
1 TABLET ORAL DAILY
Status: DISCONTINUED | OUTPATIENT
Start: 2021-06-04 | End: 2021-06-08

## 2021-06-03 RX ORDER — ONDANSETRON 8 MG/1
8 TABLET, FILM COATED ORAL EVERY 8 HOURS PRN
Status: DISCONTINUED | OUTPATIENT
Start: 2021-06-03 | End: 2021-06-09 | Stop reason: HOSPADM

## 2021-06-03 RX ORDER — OXYCODONE HYDROCHLORIDE 5 MG/1
5 TABLET ORAL EVERY 4 HOURS PRN
Status: DISCONTINUED | OUTPATIENT
Start: 2021-06-03 | End: 2021-06-04

## 2021-06-03 RX ORDER — KETOROLAC TROMETHAMINE 15 MG/ML
15 INJECTION, SOLUTION INTRAMUSCULAR; INTRAVENOUS ONCE
Status: COMPLETED | OUTPATIENT
Start: 2021-06-03 | End: 2021-06-03

## 2021-06-03 RX ORDER — VITAMIN B COMPLEX
25 TABLET ORAL DAILY
Status: DISCONTINUED | OUTPATIENT
Start: 2021-06-04 | End: 2021-06-09 | Stop reason: HOSPADM

## 2021-06-03 RX ORDER — GABAPENTIN 300 MG/1
300 CAPSULE ORAL 4 TIMES DAILY
Status: DISCONTINUED | OUTPATIENT
Start: 2021-06-03 | End: 2021-06-03 | Stop reason: DRUGHIGH

## 2021-06-03 RX ORDER — DEXTROSE MONOHYDRATE 25 G/50ML
25-50 INJECTION, SOLUTION INTRAVENOUS
Status: DISCONTINUED | OUTPATIENT
Start: 2021-06-03 | End: 2021-06-09 | Stop reason: HOSPADM

## 2021-06-03 RX ORDER — AMLODIPINE BESYLATE 5 MG/1
5 TABLET ORAL DAILY
Status: DISCONTINUED | OUTPATIENT
Start: 2021-06-04 | End: 2021-06-09 | Stop reason: HOSPADM

## 2021-06-03 RX ADMIN — SODIUM CHLORIDE 1000 ML: 9 INJECTION, SOLUTION INTRAVENOUS at 16:47

## 2021-06-03 RX ADMIN — OXYCODONE HYDROCHLORIDE 5 MG: 5 TABLET ORAL at 16:47

## 2021-06-03 RX ADMIN — HYDROMORPHONE HYDROCHLORIDE 0.5 MG: 1 INJECTION, SOLUTION INTRAMUSCULAR; INTRAVENOUS; SUBCUTANEOUS at 19:03

## 2021-06-03 RX ADMIN — OXYCODONE HYDROCHLORIDE 5 MG: 5 TABLET ORAL at 19:56

## 2021-06-03 RX ADMIN — DICYCLOMINE HYDROCHLORIDE 20 MG: 20 TABLET ORAL at 20:39

## 2021-06-03 RX ADMIN — IOPAMIDOL 135 ML: 755 INJECTION, SOLUTION INTRAVENOUS at 14:10

## 2021-06-03 RX ADMIN — GABAPENTIN 900 MG: 300 CAPSULE ORAL at 21:42

## 2021-06-03 RX ADMIN — HYDROXYZINE HYDROCHLORIDE 25 MG: 25 TABLET, FILM COATED ORAL at 21:42

## 2021-06-03 RX ADMIN — KETOROLAC TROMETHAMINE 15 MG: 15 INJECTION, SOLUTION INTRAMUSCULAR; INTRAVENOUS at 12:45

## 2021-06-03 RX ADMIN — ACETAMINOPHEN 1000 MG: 500 TABLET, FILM COATED ORAL at 19:56

## 2021-06-03 RX ADMIN — ONDANSETRON HYDROCHLORIDE 8 MG: 4 TABLET, FILM COATED ORAL at 20:00

## 2021-06-03 ASSESSMENT — MIFFLIN-ST. JEOR: SCORE: 1734.51

## 2021-06-03 ASSESSMENT — ENCOUNTER SYMPTOMS
BLOOD IN STOOL: 1
HEADACHES: 1
MYALGIAS: 1
FEVER: 0
RECTAL PAIN: 1
SHORTNESS OF BREATH: 0
ABDOMINAL PAIN: 1

## 2021-06-03 NOTE — TELEPHONE ENCOUNTER
"Prescription approved per [facility] Refill Protocol.  .Karen Andres RN    Requested Prescriptions   Pending Prescriptions Disp Refills     BD BLAYNE U/F 32G X 4 MM insulin pen needle [Pharmacy Med Name: BD PEN NEEDLE BLAYNE  32G X 4 MM MISC] 100 each 0     Sig: USE AS DIRECTED       Diabetic Supplies Protocol Failed - 6/1/2021  2:41 PM        Failed - Medication is active on med list        Passed - Patient is 18 years of age or older        Passed - Recent (6 mo) or future (30 days) visit within the authorizing provider's specialty     Patient had office visit in the last 6 months or has a visit in the next 30 days with authorizing provider.  See \"Patient Info\" tab in inbasket, or \"Choose Columns\" in Meds & Orders section of the refill encounter.                 "

## 2021-06-03 NOTE — TELEPHONE ENCOUNTER
"Call from Doretha, crying due to lower abdominal stabbing pain radiating into back, 10/10.  Yesterday had 10 episodes of \" a lot\" of rectal blood with pus, no stool, today since 8 am has had 4 episodes.  Is experiencing dizziness, denies shortness of breath. Denies fever.  Also reports is experiencing numbness of fingers and feet.  Reports blood sugars are \"all over the place\".  Advised of need to call 911.  Her 16 year old daughter is with her.  Agrees to call 911.  Has appointment scheduled with Dr Davey  6/16/21    Reason for Disposition    Sounds like a life-threatening emergency to the triager    Answer Assessment - Initial Assessment Questions  1. APPEARANCE of BLOOD: \"What color is it?\" \"Is it passed separately, on the surface of the stool, or mixed in with the stool?\"       Red and black  2. AMOUNT: \"How much blood was passed?\"       \"A lot\"    3. FREQUENCY: \"How many times has blood been passed with the stools?\"       10 times yesterday, 4 this morning since 8 am.  No stool with blood  4. ONSET: \"When was the blood first seen in the stools?\" (Days or weeks)       yesterday  5. DIARRHEA: \"Is there also some diarrhea?\" If so, ask: \"How many diarrhea stools were passed in past 24 hours?\"       No stool, just blood and pus  6. CONSTIPATION: \"Do you have constipation?\" If so, \"How bad is it?\"      No  7. RECURRENT SYMPTOMS: \"Have you had blood in your stools before?\" If so, ask: \"When was the last time?\" and \"What happened that time?\"       No  8. BLOOD THINNERS: \"Do you take any blood thinners?\" (e.g., Coumadin/warfarin, Pradaxa/dabigatran, aspirin)      No  9. OTHER SYMPTOMS: \"Do you have any other symptoms?\"  (e.g., abdominal pain, vomiting, dizziness, fever)      Experiencing lower abdominal pain radiating into back describes as stabbing, 10/10, denies fever, admits to dizziness, denies shortness of breath  10. PREGNANCY: \"Is there any chance you are pregnant?\" \"When was your last menstrual period?\"        " N/A    Protocols used: RECTAL BLEEDING-A-[place]

## 2021-06-03 NOTE — ED PROVIDER NOTES
ED Provider Note  [facility]      History     Chief Complaint   Patient presents with     Rectal Bleeding     Abdominal Pain     The history is provided by the patient and medical records.     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     Patient reports that she has been struggling with an ulcerative colitis flare since having C. Diff a few months ago. She has been working with GI to try to manage this with a steroid taper and Entyvio infusions. She has had 2 infusions so far and states that she has had no improvement of symptoms. She has been having blood in her stool with associated abdominal pain for the past 2 months which has worsened in the past few days. Yesterday she had 10 episodes of bloody mucous per rectum. She states that she has had no actual stool in days. Today she woke up around 8 am and has had 4 episodes of mucous with dark red blood. She reports rectal pain with BMs. She states that within the past week she feels her rectum has become deformed and she thinks she may be developing hemorrhoids. She has only had virtual visits recently, so this has not been examined by her doctor. She has been taking baths with little relief. She reports increased abdominal pain today. She states that today is the most abdominal pain she has ever had. Her pain is normally only left sided and today is diffuse, mostly on the lower abdomen. She was nauseous yesterday, but denies vomiting.     She called Dr. aDvey who advised she be seen in the ED. She also has a headache and generalized aches. She has a history of Cushing's and has noticed she has become more swollen in the past 72 hours.  She is on a Prednisone taper and was scheduled to go down to 30 mg yesterday, but her doctor chose to keep her at 40 mg. She notes that she was given Fentanyl en route by EMS.   Past Medical History  Past Medical History:   Diagnosis Date     Abnormal MRI     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      Anxiety      Basal cell carcinoma      Cervical high risk HPV (human papillomavirus) test positive 2019    See problem list     Colitis      Depression      Diabetes mellitus, iatrogenic (H) 2020     Inflammatory arthritis      Insomnia      Intestinal giardiasis 3/5/2018     Lumbago     left lower back pain     Lymphedema      Malignant melanoma (H)      Melanoma (H) 10/23/2017     Mild persistent asthma      Obesity      STORMY (obstructive sleep apnea)      Prothrombin deficiency (H)     takes 81mg asa daily     Stroke (cerebrum) (H)     During      TIA (transient ischemic attack)      Type 2 diabetes mellitus (H)      Past Surgical History:   Procedure Laterality Date     APPENDECTOMY       COLONOSCOPY N/A 10/18/2017    Procedure: COLONOSCOPY;  Colon;  Surgeon: Debbie Stephens MD;  Location: [facility]     COLONOSCOPY N/A 3/9/2018    Procedure: COMBINED COLONOSCOPY, SINGLE OR MULTIPLE BIOPSY/POLYPECTOMY BY BIOPSY;  colon;  Surgeon: Benita Schumacher MD;  Location: [facility] GI     COLONOSCOPY      multiple since  to present - about 6 total     DISSECT LYMPH NODE AXILLA Left 10/23/2017    Procedure: DISSECT LYMPH NODE AXILLA;  Left Axillary Lymph Node Dissection ;  Surgeon: Laurent Cool MD;  Location: [facility] OR     EXAM UNDER ANESTHESIA PELVIC N/A 2020    Procedure: EXAM UNDER ANESTHESIA, PELVIS; with Cervical Biopsies, Vaginal Biopsy and Endocervical Curettings;  Surgeon: Melina Jung MD;  Location: [facility] OR     GYN SURGERY  ,          REPAIR MOHS Left 2017    Procedure: REPAIR MOHS;  Left Upper Lid Moh's Reconstruction;  Surgeon: Kisha Bosch MD;   Location: [facility] OR     ACCU-CHEK GUIDE test strip  acetaminophen (TYLENOL) 500 MG tablet  Alcohol Swabs (ALCOHOL PREP) 70 % PADS  amLODIPine (NORVASC) 5 MG tablet  BD BLAYNE U/F 32G X 4 MM insulin pen needle  blood glucose (NO BRAND SPECIFIED) lancets standard  blood glucose (NO BRAND SPECIFIED) test strip  blood glucose monitoring (ACCU-CHEK FASTCLIX) lancets  blood glucose monitoring (NO BRAND SPECIFIED) meter device kit  Calcium Carb-Cholecalciferol 600-800 MG-UNIT TABS  calcium carbonate (TUMS) 500 MG chewable tablet  dicyclomine (BENTYL) 20 MG tablet  famotidine (PEPCID) 10 MG tablet  ferrous fumarate 65 mg, [place]. FE,-Vitamin C 125 mg (VITRON-C)  MG TABS tablet  gabapentin (NEURONTIN) 300 MG capsule  hydrOXYzine (ATARAX) 25 MG tablet  insulin lispro (HUMALOG KWIKPEN) 100 UNIT/ML (1 unit dial) KWIKPEN  metFORMIN (GLUCOPHAGE-XR) 500 MG 24 hr tablet  ondansetron (ZOFRAN) 8 MG tablet  oxyCODONE (ROXICODONE) 5 MG tablet  pantoprazole (PROTONIX) 40 MG EC tablet  predniSONE (DELTASONE) 10 MG tablet  rosuvastatin (CRESTOR) 10 MG tablet  sulfamethoxazole-trimethoprim (BACTRIM DS) 800-160 MG tablet  venlafaxine (EFFEXOR-ER) 225 MG 24 hr tablet  VITAMIN D3 25 MCG (1000 UT) tablet      Allergies   Allergen Reactions     Bee Venom Swelling     Azithromycin Diarrhea     Erythromycin      Other reaction(s): GI intolerance, Vomiting     Fentanyl Other (See Comments)     sweating  sweating     Prochlorperazine Fatigue     Other reaction(s): Other (see comments)  Fatigue     Buspirone      Other reaction(s): GI intolerance  vomiting     Erythrocin Nausea and Vomiting     Zithromax [Azithromycin Dihydrate] Diarrhea     Enbrel [Etanercept] Hives and Rash     Family History  Family History   Problem Relation Age of Onset     Cancer Mother 45        lung     Neurologic Disorder Mother         epilepsy     Lipids Father      Gastrointestinal Disease Father         diverticulitis      Depression Father      Colitis Father      Colon  Cancer Father      Diverticulitis Father      Cancer Maternal Grandmother      Blood Disease Maternal Grandmother         lymphoma      Arthritis Maternal Grandmother      Diabetes Maternal Grandmother      Depression Maternal Grandmother      Macular Degeneration Maternal Grandmother      Glaucoma Maternal Grandmother      Diabetes Maternal Grandfather      Cerebrovascular Disease Maternal Grandfather      Blood Disease Maternal Grandfather      Heart Disease Maternal Grandfather      Glaucoma Maternal Grandfather      Cancer Paternal Grandmother      Cancer - colorectal Paternal Grandmother      Colitis Paternal Grandmother      Colon Cancer Paternal Grandmother      Diverticulitis Paternal Grandmother      Respiratory Paternal Grandfather         emphysema      Colitis Paternal Grandfather      Colon Cancer Paternal Grandfather      Diverticulitis Paternal Grandfather      Heart Disease Daughter      Asthma Daughter      Depression Sister      Melanoma No family hx of      Social History   Social History     Tobacco Use     Smoking status: Former Smoker     Packs/day: 1.00     Years: 5.00     Pack years: 5.00     Quit date: 3/20/1998     Years since quittin.2     Smokeless tobacco: Never Used   Substance Use Topics     Alcohol use: Yes     Comment: occ     Drug use: No      Past medical history, past surgical history, medications, allergies, family history, and social history were reviewed with the patient. No additional pertinent items.       Review of Systems   Constitutional: Negative for fever.   Respiratory: Negative for shortness of breath.    Cardiovascular: Negative for chest pain.   Gastrointestinal: Positive for abdominal pain, blood in stool and rectal pain.   Musculoskeletal: Positive for myalgias.   Neurological: Positive for headaches. Negative for syncope.   All other systems reviewed and are negative.    A complete review of systems was performed with pertinent positives and negatives noted  "in the HPI, and all other systems negative.    Physical Exam   BP: 125/84  Pulse: 78  Temp: 98.2  F (36.8  C)  Resp: 18  Height: 160 cm (5' 3\")  Weight: 112 kg (247 lb)  SpO2: 97 %  Physical Exam  General: awake, alert, patient tearful and though nontoxic-appearing  Head: normal cephalic  HEENT: pupils equal, conjugate gaze intact, edema noted around the eyelids, no scleral icterus  Neck: Supple  CV: regular rate and rhythm without murmur  Lungs: clear to auscultation  Abd: soft, patient has diffuse tenderness across the lower abdomen, no guarding, no peritoneal signs  Rectal: Hemorrhoidal tissue noted on exam though no bleeding hemorrhoids or prolapsed hemorrhoids noted.  No rectal prolapse appreciated.  Patient did not tolerate internal exam but no masses or lesions appreciated on brief internal exam.  EXT: lower extremities without swelling or edema  Neuro: awake, answers questions appropriately. No focal deficits noted       ED Course      Procedures        The medical record was reviewed and interpreted.  Current labs reviewed and interpreted.  Previous labs reviewed and interpreted.       Results for orders placed or performed during the hospital encounter of 06/03/21   CT Abdomen Pelvis w Contrast     Status: None    Narrative    CT abdomen pelvis with contrast    Indication left lower quadrant abdominal pain    COMPARISON: Thyroid 821    Contrast: 135 mL intravenous Isovue-370    FINDINGS: The included lower chest appears normal.  Within the abdomen and pelvis there is mildly homogeneous appearance  of low density throughout the liver without focal mass or ductal  dilation, suggestive of mild steatosis. The spleen appears normal.  Bilateral adrenal glands appear normal. Bilateral kidneys appear  normal. Atrophy of the body and tail of the pancreas is noted. No  masses in the head region and there is no ductal dilation. Gallbladder  appears grossly normal. No enlarged mesenteric or retroperitoneal  lymph " nodes. The abdominal aorta and IVC are normal in size. Urinary  bladder appears grossly normal. Prominent adnexal hypodensities are  present, the left measures 3.5 cm in greatest dimension and the right  measures 2.6 cm in greatest dimension, likely subtly prominent ovaries  with possible follicles. Consider pelvic ultrasound. No free fluid. No  enlarged inguinal or deep pelvic lymph nodes.  Bones show mild degenerative changes in the thoracolumbar spine there  is no suspicious sclerotic or lytic/destructive lesion. Sigmoid colon  is redundant and there is diverticulosis without inflammatory change.      Impression    IMPRESSION: Prominent adnexal structures which may indicate ovarian  follicles, consider pelvic ultrasound. Redundant sigmoid colon with  diverticulosis. No evidence of inflammation. Thoracolumbar  spondylosis. Extensive fatty atrophy of the distal body as well as  tail portions of the pancreas.    STACY ADAME MD   CBC with platelets differential     Status: Abnormal   Result Value Ref Range    WBC 12.3 (H) 4.0 - 11.0 10e9/L    RBC Count 4.25 3.8 - 5.2 10e12/L    Hemoglobin 13.0 11.7 - 15.7 g/dL    Hematocrit 41.0 35.0 - 47.0 %    MCV 97 78 - 100 fl    MCH 30.6 26.5 - 33.0 pg    MCHC 31.7 31.5 - 36.5 g/dL    RDW 13.4 10.0 - 15.0 %    Platelet Count 257 150 - 450 10e9/L    Diff Method Manual Differential     % Neutrophils 53.6 %    % Lymphocytes 36.6 %    % Monocytes 7.1 %    % Eosinophils 0.0 %    % Basophils 0.9 %    % Promyelocytes 1.8 %    Nucleated RBCs 1 (H) 0 /100    Absolute Neutrophil 6.6 1.6 - 8.3 10e9/L    Absolute Lymphocytes 4.5 0.8 - 5.3 10e9/L    Absolute Monocytes 0.9 0.0 - 1.3 10e9/L    Absolute Eosinophils 0.0 0.0 - 0.7 10e9/L    Absolute Basophils 0.1 0.0 - 0.2 10e9/L    Absolute Promyeloctyes 0.2 (H) 0 10e9/L    Absolute Nucleated RBC 0.1     RBC Morphology Normal     Platelet Estimate Confirming automated cell count    Comprehensive metabolic panel     Status: Abnormal    Result Value Ref Range    Sodium 139 133 - 144 mmol/L    Potassium 3.6 3.4 - 5.3 mmol/L    Chloride 105 94 - 109 mmol/L    Carbon Dioxide 28 20 - 32 mmol/L    Anion Gap 6 3 - 14 mmol/L    Glucose 116 (H) 70 - 99 mg/dL    Urea Nitrogen 21 7 - 30 mg/dL    Creatinine 0.73 0.52 - 1.04 mg/dL    GFR Estimate >90 >60 mL/min/[1.73_m2]    GFR Estimate If Black >90 >60 mL/min/[1.73_m2]    Calcium 9.2 8.5 - 10.1 mg/dL    Bilirubin Total 0.2 0.2 - 1.3 mg/dL    Albumin 3.5 3.4 - 5.0 g/dL    Protein Total 6.7 (L) 6.8 - 8.8 g/dL    Alkaline Phosphatase 53 40 - 150 U/L    ALT 49 0 - 50 U/L    AST 10 0 - 45 U/L   Lipase     Status: Abnormal   Result Value Ref Range    Lipase 53 (L) 73 - 393 U/L   UA with Microscopic     Status: Abnormal   Result Value Ref Range    Color Urine Light Yellow     Appearance Urine Clear     Glucose Urine Negative NEG^Negative mg/dL    Bilirubin Urine Negative NEG^Negative    Ketones Urine Negative NEG^Negative mg/dL    Specific Gravity Urine 1.020 1.003 - 1.035    Blood Urine Trace (A) NEG^Negative    pH Urine 5.5 5.0 - 7.0 pH    Protein Albumin Urine 10 (A) NEG^Negative mg/dL    Urobilinogen mg/dL Normal 0.0 - 2.0 mg/dL    Nitrite Urine Negative NEG^Negative    Leukocyte Esterase Urine Trace (A) NEG^Negative    Source Midstream Urine     WBC Urine 3 0 - 5 /HPF    RBC Urine 3 (H) 0 - 2 /HPF    Squamous Epithelial /HPF Urine 10 (H) 0 - 1 /HPF    Mucous Urine Present (A) NEG^Negative /LPF     Medications   0.9% sodium chloride BOLUS (1,000 mLs Intravenous New Bag 6/3/21 1647)   ketorolac (TORADOL) injection 15 mg (15 mg Intravenous Given 6/3/21 1245)   iopamidol (ISOVUE-370) solution 135 mL (135 mLs Intravenous Given 6/3/21 1410)   sodium chloride (PF) 0.9% PF flush 84 mL (84 mLs Intravenous Given 6/3/21 1410)   oxyCODONE (ROXICODONE) tablet 5 mg (5 mg Oral Given 6/3/21 1647)        Assessments & Plan (with Medical Decision Making)   Doretha Barcenas is a 46-year-old female who presents with ongoing symptoms  of abdominal pain, rectal bleeding.  Patient endorses a chronicity of symptoms over the last several months that are not improving despite outpatient medications and steroids.    Would consider potential complication such as a new abscess or perforation though probably less likely given the patient's chronicity of her symptoms.  Also consider things like electrolyte abnormalities with profuse diarrhea or acute blood loss anemia with rectal bleeding.    Initial evaluation will include lab work and symptomatic control.    Labs notable for stable white count, could be steroid effect, stable hemoglobin.  Patient with normal electrolytes.  Will obtain CT scan given her worsening symptoms to rule out complication such as perforation or abscess.    Patient again fentanyl in route and Toradol here in the emergency department.  Did note improved pain from the headache after Toradol.    CT scan without acute findings, prominent adnexa noted patient made aware and can follow-up as an outpatient but do not feel that this is the cause of her abdominal pain, per chart review there is discussion about inpatient admission a couple of days ago to pursue colectomy or switch IV treatments, patient is not managing outpatient so plan is to admit for ongoing symptomatic control, GI consult and likely colorectal surgery consult.    She remained vitally stable while in the emergency department.  She was given oxycodone and Toradol for pain control.  IV fluids.  Patient reported several bloody stools while in the emergency department.    This part of the document was transcribed by Henry Sandhu, Medical Scribe.    I have reviewed the nursing notes. I have reviewed the findings, diagnosis, plan and need for follow up with the patient.    New Prescriptions    No medications on file       Final diagnoses:   Ulcerative colitis without complications, unspecified location (H)   I, Sonam Adair, am serving as a trained medical scribe to  document services personally performed by Victoriano Alonzo MD, based on the provider's statements to me.     I, Victoriano Alonzo MD, was physically present and have reviewed and verified the accuracy of this note documented by Sonam Adair.      --  Victoriano Alonzo MD  [facility] EMERGENCY DEPARTMENT  6/3/2021     Victoriano Alonzo MD  06/03/21 1144

## 2021-06-03 NOTE — ED TRIAGE NOTES
Pt BIBA from home. Pt here 3 weeks ago for UC flare. Pt was discharged with steroids vs surgery. Bloody bowel movements since this morning. 4 episodes today and 10 episodes yesterday. 100mcg fentanyl given intra nasal. . BP decreasing with EMS. A&O

## 2021-06-03 NOTE — H&P
History and Physical    Doretha Fernandez MRN# [id number]   Age: 46 year old YOB: 1975     Date of Admission:  6/3/2021    Primary care provider: Anna Naidu          Assessment and Plan:   Assessment:  Doretha Fernandez is a 45 yo F with PMH of metastatic malignant melanoma in complete remission, adjuvant immunotherapy induced severe colitis on Vedolizumab and prednisone taper (w/ possible plans for colectomy in the future), steroid induced DM2, and chronic pain syndrome who presented to the ED with complaints of rectal bleeding and abdominal pain thought to be secondary to UC flare.     Plan:    Ulcerative Colitis, possible flare   Chronic Abdominal Pain   Presents with abdominal pain and rectal bleeding likely related to ulcerative colitis flare. States she was diagnosed with UC 3 years ago as a complication of immunotherapy and was symptom free for several years. However, within the past 3 months, she developed strep pharyngitis and was treated with antibiotics. She ultimately developed C.Diff as a complication of her antibiotics and her UC flares have been recurring since then. She has tried steroid tapers multiple times and two Entyvio infusions without relief. She is strongly considering colectomy. Will discuss continuation of steroid taper vs increased dose vs pausing for possible surgery with GI.  - GI consult, formal consult in the AM    - Per discussion with GI, Dr. Turcios over the phone, obtain ESR and CRP to check for elevation. Continue current dose of prednisone taper for now. GI will evaluate in the AM.   - PTA Bentyl 20mg PO TID   - PTA Zofran 8mg PO Q8H PRN   - PTA Oxycodone 5mg PO Q4H PRN   - PTA Tylenol 1000mg PO Q8H PRN   - PTA Gabapentin 300mg PO QID   - Ordered Dilaudid 0.5mg Q2H PRN     Was on a Prednisone Taper as follows:    - 60mg PO daily for 7 days    - 50mg PO daily for 7 days    - 40mg PO daily for 7 days    - 30 mg PO daily for 7 days (pt is on day one; end date: 6/10/21)     - 20 mg PO daily for 7 days    - 10 mg PO daily for 7 days     Steroid Induced DM2   - PTA insulin lispro 1-7 units QID with meals   - Holding PTA Metformin in setting of possible imaging     UTI?   - PTA Bactrim 800/160mg PO daily     Chronic Problems:     HTN   HLD   - PTA Amlodipine 5mg PO daily   - PTA Rosuvastatin 10mg PO daily     GERD   Unclear why patient is on both Famotidine and Pantoprazole. Will continue Pantoprazole for now.   - hold PTA Famotidine 10mg PO BID   - PTA Pantoprazole 40mg PO every day      Iron Deficiency Anemia, resolved   Hgb stable at 13.0.   - PTA iron/vitamin C tablet 65-125mg PO every day     Anxiety   - PTA Atarax 25mg PO TID     MDD   - PTA Venlafaxine 225 mg PO daily     Vitamin D Deficiency   - PTA Vitamin D 25 mcg PO daily       FEN: Advance diet as tolerated   Prophylaxis: On SCDs, not on anticoagulation secondary to rectal bleeding and possible colectomy pending discussion with GI   Consults: GI consult     Code Status: FULL    Disposition: Anticipate pt's admission to span two midnights or more pending further workup.    Patient staffed with Dr. Wood.     Abran Castano MD  Internal Medicine, PGY-2  P: [phone]           Chief Complaint:   Abdominal Pain   Rectal Bleeding          History of Present Illness:   Doretha Fernandez is a 45 yo F with PMH of metastatic malignant melanoma in complete remission, adjuvant immunotherapy induced severe colitis on Vedolizumab and prednisone taper (w/ possible plans for colectomy in the future), steroid induced DM2, and chronic pain syndrome who presented to the ED with complaints of rectal bleeding and abdominal pain thought to be secondary to UC flare.     Patient was initially diagnosed with ulcerative colitis 3 years ago and was flare free for several years until 3 months ago when she had strep pharyngitis and received antibiotics. She developed C. Diff after antibiotic treatment and has had multiple flares since then. So far,  she has received steroid tapers and two Entyvio infusions for management. Pt states that the infusions have not helped to relieve her symptoms. She has had abdominal pain with associated rectal bleeding for the past two months with worsening symptoms within the past few days, states she had over 10 bloody bowel movements yesterday. Her bowel movements are mucous in nature and without stool.     Currently, she has had 11 bowel movements today since 8AM. Again, these bowel movements are mucous and without stool and dark red. She does have rectal pain with her BMs, She worries that she may be developing hemorrhoids as she feels that her rectum is more deformed. She endorses abdominal pain that is diffuse but worse in her lower abdomen. She follows with GI and reached out to Dr. Davey, her GI specialist, who told her to report to the ED. She is very teary during our discussion and states that she is strongly considering colectomy as a more permanent treatment option.            Review of Systems:   10-pt ROS negative except as mentioned in HPI          Past Medical History:     Medical History reviewed.   Past Medical History:   Diagnosis Date    Abnormal MRI     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     Anxiety     Basal cell carcinoma     Cervical high risk HPV (human papillomavirus) test positive 2019    See problem list    Colitis     Depression     Diabetes mellitus, iatrogenic (H) 2020    Inflammatory arthritis     Insomnia     Intestinal giardiasis 3/5/2018    Lumbago     left lower back pain    Lymphedema     Malignant melanoma (H)     Melanoma (H) 10/23/2017    Mild persistent asthma     Obesity     STORMY (obstructive sleep apnea)     Prothrombin deficiency (H)     takes 81mg asa daily    Stroke (cerebrum) (H)     During     TIA (transient ischemic attack)     Type 2 diabetes mellitus (H)              Past Surgical History:   Surgical History reviewed.   Past Surgical  History:   Procedure Laterality Date    APPENDECTOMY      COLONOSCOPY N/A 10/18/2017    Procedure: COLONOSCOPY;  Colon;  Surgeon: Debbie Stephens MD;  Location: [facility]    COLONOSCOPY N/A 3/9/2018    Procedure: COMBINED COLONOSCOPY, SINGLE OR MULTIPLE BIOPSY/POLYPECTOMY BY BIOPSY;  colon;  Surgeon: Benita Schumacher MD;  Location: [facility]    COLONOSCOPY      multiple since  to present - about 6 total    DISSECT LYMPH NODE AXILLA Left 10/23/2017    Procedure: DISSECT LYMPH NODE AXILLA;  Left Axillary Lymph Node Dissection ;  Surgeon: Laurent Cool MD;  Location: [facility] OR    EXAM UNDER ANESTHESIA PELVIC N/A 2020    Procedure: EXAM UNDER ANESTHESIA, PELVIS; with Cervical Biopsies, Vaginal Biopsy and Endocervical Curettings;  Surgeon: Melina Jung MD;  Location: [facility] OR    GYN SURGERY  ,         REPAIR MOHS Left 2017    Procedure: REPAIR MOHS;  Left Upper Lid Moh's Reconstruction;  Surgeon: Kisha Bosch MD;  Location: [facility]             Social History:   Social History reviewed.   Social History     Tobacco Use    Smoking status: Former Smoker     Packs/day: 1.00     Years: 5.00     Pack years: 5.00     Quit date: 3/20/1998     Years since quittin.2    Smokeless tobacco: Never Used   Substance Use Topics    Alcohol use: Yes     Comment: occ             Family History:   Family History reviewed.  Family History   Problem Relation Age of Onset    Cancer Mother 45        lung    Neurologic Disorder Mother         epilepsy    Lipids Father     Gastrointestinal Disease Father         diverticulitis     Depression Father     Colitis Father     Colon Cancer Father     Diverticulitis Father     Cancer Maternal Grandmother     Blood Disease Maternal Grandmother         lymphoma     Arthritis Maternal Grandmother     Diabetes Maternal Grandmother     Depression Maternal Grandmother     Macular Degeneration Maternal Grandmother     Glaucoma Maternal Grandmother     Diabetes Maternal  Grandfather     Cerebrovascular Disease Maternal Grandfather     Blood Disease Maternal Grandfather     Heart Disease Maternal Grandfather     Glaucoma Maternal Grandfather     Cancer Paternal Grandmother     Cancer - colorectal Paternal Grandmother     Colitis Paternal Grandmother     Colon Cancer Paternal Grandmother     Diverticulitis Paternal Grandmother     Respiratory Paternal Grandfather         emphysema     Colitis Paternal Grandfather     Colon Cancer Paternal Grandfather     Diverticulitis Paternal Grandfather     Heart Disease Daughter     Asthma Daughter     Depression Sister     Melanoma No family hx of              Allergies:     Allergies   Allergen Reactions    Bee Venom Swelling    Azithromycin Diarrhea    Erythromycin      Other reaction(s): GI intolerance, Vomiting    Fentanyl Other (See Comments)     sweating  sweating    Prochlorperazine Fatigue     Other reaction(s): Other (see comments)  Fatigue    Buspirone      Other reaction(s): GI intolerance  vomiting    Erythrocin Nausea and Vomiting    Zithromax [Azithromycin Dihydrate] Diarrhea    Enbrel [Etanercept] Hives and Rash             Medications:   Medications Reviewed.   Current Facility-Administered Medications   Medication    0.9% sodium chloride BOLUS     Current Outpatient Medications   Medication Sig    ACCU-CHEK GUIDE test strip USE TO TEST BLOOD SUGAR TWO TIMES A DAY OR AS DIRECTED    acetaminophen (TYLENOL) 500 MG tablet Take 1,000 mg by mouth every 8 hours as needed for mild pain    Alcohol Swabs (ALCOHOL PREP) 70 % PADS 1 applicator 3 times daily    amLODIPine (NORVASC) 5 MG tablet Take 1 tablet (5 mg) by mouth daily    BD BLAYNE U/F 32G X 4 MM insulin pen needle USE AS DIRECTED    blood glucose (NO BRAND SPECIFIED) lancets standard Use to test blood sugar 3 times daily or as directed.    blood glucose (NO BRAND SPECIFIED) test strip Use to test blood sugar 3 times daily or as directed.    blood glucose monitoring (ACCU-CHEK  FASTCLIX) lancets USE TWO TIMES A DAY OR AS DIRECTED    blood glucose monitoring (NO BRAND SPECIFIED) meter device kit Use to test blood sugar 3 times daily or as directed.    Calcium Carb-Cholecalciferol 600-800 MG-UNIT TABS Take 800 mg by mouth daily before breakfast    calcium carbonate (TUMS) 500 MG chewable tablet Take 1 tablet (500 mg) by mouth 3 times daily as needed for heartburn    dicyclomine (BENTYL) 20 MG tablet Take 1 tablet (20 mg) by mouth 3 times daily (with meals)    famotidine (PEPCID) 10 MG tablet Take 1 tablet (10 mg) by mouth 2 times daily    ferrous fumarate 65 mg, [place]. FE,-Vitamin C 125 mg (VITRON-C)  MG TABS tablet Take 1 tablet by mouth daily    gabapentin (NEURONTIN) 300 MG capsule TAKE THREE CAPSULES BY MOUTH FOUR TIMES A DAY    hydrOXYzine (ATARAX) 25 MG tablet TAKE ONE TABLET BY MOUTH AT BEDTIME (Patient taking differently: As needed)    insulin lispro (HUMALOG KWIKPEN) 100 UNIT/ML (1 unit dial) KWIKPEN Inject 1-7 Units Subcutaneous 4 times daily (with meals and nightly) Per the insulin sliding scale provided in the discharge summary.    metFORMIN (GLUCOPHAGE-XR) 500 MG 24 hr tablet Take 1,000 mg by mouth Daily with breakfast.    ondansetron (ZOFRAN) 8 MG tablet Take 8 mg by mouth every 8 hours as needed for nausea (Pt has not taken in past year 2/10/20)     oxyCODONE (ROXICODONE) 5 MG tablet Take 1 tablet (5 mg) by mouth every 4 hours as needed for moderate to severe pain    pantoprazole (PROTONIX) 40 MG EC tablet Take 1 tablet (40 mg) by mouth every morning (before breakfast)    predniSONE (DELTASONE) 10 MG tablet Take 6 tablets daily for 7 days, then 5 tablets daily for 7 days, then 4 tablets daily for 7 days, then 3 tablets daily for 7 days. Call the GI doctors for refill after that point.    rosuvastatin (CRESTOR) 10 MG tablet Take 1 tablet (10 mg) by mouth daily    sulfamethoxazole-trimethoprim (BACTRIM DS) 800-160 MG tablet Take 1 tablet by mouth daily    venlafaxine  "(EFFEXOR-ER) 225 MG 24 hr tablet TAKE ONE TABLET BY MOUTH ONCE DAILY    VITAMIN D3 25 MCG (1000 UT) tablet TAKE THREE TABLETS BY MOUTH ONCE DAILY     Facility-Administered Medications Ordered in Other Encounters   Medication    lidocaine 1 % 9 mL    sodium bicarbonate 8.4 % injection 1 mEq             Physical Exam:   Vitals were reviewed.  Blood pressure 108/70, pulse 78, temperature 98.2  F (36.8  C), temperature source Oral, resp. rate 18, height 1.6 m (5' 3\"), weight 112 kg (247 lb), SpO2 94 %, not currently breastfeeding.    General: AAOx3, NAD, teary, obese   Skin: Not jaundiced, no rash, no ecchymoses  HEENT: MMM, PERRLA, EOM intact  CV: RRR, normal S1S2, no murmur, clicks, rubs  Resp: Clear to auscultation bilaterally, no wheezes, rhonchi  Abd: Obese, Soft, diffusely tender to palpation, BS+, no masses appreciated  Extremities: warm and well perfused, palpable pulses, peripheral edema present bilaterally   Neuro: No lateralizing symptoms or focal neurologic deficits         Data:   No intake/output data recorded.    ROUTINE LABS (Last four results)  CMP  Recent Labs   Lab 06/03/21  1224      POTASSIUM 3.6   CHLORIDE 105   CO2 28   ANIONGAP 6   *   BUN 21   CR 0.73   GFRESTIMATED >90   GFRESTBLACK >90   PATRIICA 9.2   PROTTOTAL 6.7*   ALBUMIN 3.5   BILITOTAL 0.2   ALKPHOS 53   AST 10   ALT 49     CBC  Recent Labs   Lab 06/03/21  1224   WBC 12.3*   RBC 4.25   HGB 13.0   HCT 41.0   MCV 97   MCH 30.6   MCHC 31.7   RDW 13.4        INRNo lab results found in last 7 days.  Arterial Blood GasNo lab results found in last 7 days.      CT abdomen pelvis with contrast     Indication left lower quadrant abdominal pain     COMPARISON: Thyroid 821     Contrast: 135 mL intravenous [id number]     FINDINGS: The included lower chest appears normal.  Within the abdomen and pelvis there is mildly homogeneous appearance  of low density throughout the liver without focal mass or ductal  dilation, suggestive of mild " steatosis. The spleen appears normal.  Bilateral adrenal glands appear normal. Bilateral kidneys appear  normal. Atrophy of the body and tail of the pancreas is noted. No  masses in the head region and there is no ductal dilation. Gallbladder  appears grossly normal. No enlarged mesenteric or retroperitoneal  lymph nodes. The abdominal aorta and IVC are normal in size. Urinary  bladder appears grossly normal. Prominent adnexal hypodensities are  present, the left measures 3.5 cm in greatest dimension and the right  measures 2.6 cm in greatest dimension, likely subtly prominent ovaries  with possible follicles. Consider pelvic ultrasound. No free fluid. No  enlarged inguinal or deep pelvic lymph nodes.  Bones show mild degenerative changes in the thoracolumbar spine there  is no suspicious sclerotic or lytic/destructive lesion. Sigmoid colon  is redundant and there is diverticulosis without inflammatory change.                                                                      IMPRESSION: Prominent adnexal structures which may indicate ovarian  follicles, consider pelvic ultrasound. Redundant sigmoid colon with  diverticulosis. No evidence of inflammation. Thoracolumbar  spondylosis. Extensive fatty atrophy of the distal body as well as  tail portions of the pancreas.    - End of Note -

## 2021-06-04 LAB
ALBUMIN SERPL-MCNC: 3.2 G/DL (ref 3.4–5)
ALP SERPL-CCNC: 54 U/L (ref 40–150)
ALT SERPL W P-5'-P-CCNC: 42 U/L (ref 0–50)
ANION GAP SERPL CALCULATED.3IONS-SCNC: 5 MMOL/L (ref 3–14)
AST SERPL W P-5'-P-CCNC: 12 U/L (ref 0–45)
BACTERIA SPEC CULT: NORMAL
BILIRUB SERPL-MCNC: 0.3 MG/DL (ref 0.2–1.3)
BUN SERPL-MCNC: 18 MG/DL (ref 7–30)
C COLI+JEJUNI+LARI FUSA STL QL NAA+PROBE: NOT DETECTED
C DIFF TOX B STL QL: NEGATIVE
CA-I BLD-MCNC: 4.5 MG/DL (ref 4.4–5.2)
CALCIUM SERPL-MCNC: 8.3 MG/DL (ref 8.5–10.1)
CHLORIDE SERPL-SCNC: 104 MMOL/L (ref 94–109)
CO2 SERPL-SCNC: 28 MMOL/L (ref 20–32)
CREAT SERPL-MCNC: 0.72 MG/DL (ref 0.52–1.04)
CRP SERPL-MCNC: 58 MG/L (ref 0–8)
EC STX1 GENE STL QL NAA+PROBE: NOT DETECTED
EC STX2 GENE STL QL NAA+PROBE: NOT DETECTED
ENTERIC PATHOGEN COMMENT: NORMAL
ERYTHROCYTE [DISTWIDTH] IN BLOOD BY AUTOMATED COUNT: 13.4 % (ref 10–15)
GFR SERPL CREATININE-BSD FRML MDRD: >90 ML/MIN/{1.73_M2}
GLUCOSE BLDC GLUCOMTR-MCNC: 126 MG/DL (ref 70–99)
GLUCOSE BLDC GLUCOMTR-MCNC: 156 MG/DL (ref 70–99)
GLUCOSE BLDC GLUCOMTR-MCNC: 175 MG/DL (ref 70–99)
GLUCOSE BLDC GLUCOMTR-MCNC: 228 MG/DL (ref 70–99)
GLUCOSE SERPL-MCNC: 113 MG/DL (ref 70–99)
HCT VFR BLD AUTO: 38.4 % (ref 35–47)
HGB BLD-MCNC: 12.3 G/DL (ref 11.7–15.7)
Lab: NORMAL
MCH RBC QN AUTO: 30.8 PG (ref 26.5–33)
MCHC RBC AUTO-ENTMCNC: 32 G/DL (ref 31.5–36.5)
MCV RBC AUTO: 96 FL (ref 78–100)
NOROV GI+II ORF1-ORF2 JNC STL QL NAA+PR: NOT DETECTED
PLATELET # BLD AUTO: 225 10E9/L (ref 150–450)
POTASSIUM SERPL-SCNC: 4.1 MMOL/L (ref 3.4–5.3)
PROT SERPL-MCNC: 6.2 G/DL (ref 6.8–8.8)
RBC # BLD AUTO: 3.99 10E12/L (ref 3.8–5.2)
RVA NSP5 STL QL NAA+PROBE: NOT DETECTED
SALMONELLA SP RPOD STL QL NAA+PROBE: NOT DETECTED
SHIGELLA SP+EIEC IPAH STL QL NAA+PROBE: NOT DETECTED
SODIUM SERPL-SCNC: 136 MMOL/L (ref 133–144)
SPECIMEN SOURCE: NORMAL
SPECIMEN SOURCE: NORMAL
V CHOL+PARA RFBL+TRKH+TNAA STL QL NAA+PR: NOT DETECTED
WBC # BLD AUTO: 9 10E9/L (ref 4–11)
Y ENTERO RECN STL QL NAA+PROBE: NOT DETECTED

## 2021-06-04 PROCEDURE — 99221 1ST HOSP IP/OBS SF/LOW 40: CPT | Performed by: PHYSICIAN ASSISTANT

## 2021-06-04 PROCEDURE — 250N000013 HC RX MED GY IP 250 OP 250 PS 637: Performed by: STUDENT IN AN ORGANIZED HEALTH CARE EDUCATION/TRAINING PROGRAM

## 2021-06-04 PROCEDURE — 87493 C DIFF AMPLIFIED PROBE: CPT | Performed by: STUDENT IN AN ORGANIZED HEALTH CARE EDUCATION/TRAINING PROGRAM

## 2021-06-04 PROCEDURE — 36415 COLL VENOUS BLD VENIPUNCTURE: CPT | Performed by: INTERNAL MEDICINE

## 2021-06-04 PROCEDURE — 99222 1ST HOSP IP/OBS MODERATE 55: CPT | Performed by: NURSE PRACTITIONER

## 2021-06-04 PROCEDURE — 82330 ASSAY OF CALCIUM: CPT | Performed by: STUDENT IN AN ORGANIZED HEALTH CARE EDUCATION/TRAINING PROGRAM

## 2021-06-04 PROCEDURE — 36415 COLL VENOUS BLD VENIPUNCTURE: CPT | Performed by: STUDENT IN AN ORGANIZED HEALTH CARE EDUCATION/TRAINING PROGRAM

## 2021-06-04 PROCEDURE — 250N000013 HC RX MED GY IP 250 OP 250 PS 637: Performed by: NURSE PRACTITIONER

## 2021-06-04 PROCEDURE — 120N000002 HC R&B MED SURG/OB UMMC

## 2021-06-04 PROCEDURE — 86140 C-REACTIVE PROTEIN: CPT | Performed by: INTERNAL MEDICINE

## 2021-06-04 PROCEDURE — 250N000012 HC RX MED GY IP 250 OP 636 PS 637: Performed by: STUDENT IN AN ORGANIZED HEALTH CARE EDUCATION/TRAINING PROGRAM

## 2021-06-04 PROCEDURE — 250N000011 HC RX IP 250 OP 636: Performed by: STUDENT IN AN ORGANIZED HEALTH CARE EDUCATION/TRAINING PROGRAM

## 2021-06-04 PROCEDURE — 87506 IADNA-DNA/RNA PROBE TQ 6-11: CPT | Performed by: STUDENT IN AN ORGANIZED HEALTH CARE EDUCATION/TRAINING PROGRAM

## 2021-06-04 PROCEDURE — 99233 SBSQ HOSP IP/OBS HIGH 50: CPT | Mod: GC | Performed by: INTERNAL MEDICINE

## 2021-06-04 PROCEDURE — 85027 COMPLETE CBC AUTOMATED: CPT | Performed by: INTERNAL MEDICINE

## 2021-06-04 PROCEDURE — 999N001017 HC STATISTIC GLUCOSE BY METER IP

## 2021-06-04 PROCEDURE — 999N000199 HC STATISTIC WOC PT EDUCATION, 31-45 MIN

## 2021-06-04 PROCEDURE — 250N000013 HC RX MED GY IP 250 OP 250 PS 637: Performed by: INTERNAL MEDICINE

## 2021-06-04 PROCEDURE — 80053 COMPREHEN METABOLIC PANEL: CPT | Performed by: INTERNAL MEDICINE

## 2021-06-04 RX ORDER — PANTOPRAZOLE SODIUM 40 MG/1
40 TABLET, DELAYED RELEASE ORAL
Qty: 30 TABLET | Refills: 0 | Status: SHIPPED | OUTPATIENT
Start: 2021-06-04 | End: 2021-06-09

## 2021-06-04 RX ORDER — NALOXONE HYDROCHLORIDE 0.4 MG/ML
0.2 INJECTION, SOLUTION INTRAMUSCULAR; INTRAVENOUS; SUBCUTANEOUS
Status: DISCONTINUED | OUTPATIENT
Start: 2021-06-04 | End: 2021-06-09 | Stop reason: HOSPADM

## 2021-06-04 RX ORDER — MULTIPLE VITAMINS W/ MINERALS TAB 9MG-400MCG
1 TAB ORAL DAILY
Status: DISCONTINUED | OUTPATIENT
Start: 2021-06-04 | End: 2021-06-09 | Stop reason: HOSPADM

## 2021-06-04 RX ORDER — NALOXONE HYDROCHLORIDE 0.4 MG/ML
0.4 INJECTION, SOLUTION INTRAMUSCULAR; INTRAVENOUS; SUBCUTANEOUS
Status: DISCONTINUED | OUTPATIENT
Start: 2021-06-04 | End: 2021-06-09 | Stop reason: HOSPADM

## 2021-06-04 RX ORDER — METHYLPREDNISOLONE SODIUM SUCCINATE 125 MG/2ML
32 INJECTION, POWDER, LYOPHILIZED, FOR SOLUTION INTRAMUSCULAR; INTRAVENOUS ONCE
Status: COMPLETED | OUTPATIENT
Start: 2021-06-04 | End: 2021-06-04

## 2021-06-04 RX ORDER — AMLODIPINE BESYLATE 5 MG/1
5 TABLET ORAL DAILY
Qty: 30 TABLET | Refills: 0 | Status: SHIPPED | OUTPATIENT
Start: 2021-06-04 | End: 2021-06-09

## 2021-06-04 RX ORDER — HYDROCORTISONE 100 MG/60ML
100 SUSPENSION RECTAL 2 TIMES DAILY
Status: DISCONTINUED | OUTPATIENT
Start: 2021-06-04 | End: 2021-06-09 | Stop reason: HOSPADM

## 2021-06-04 RX ORDER — OXYCODONE HYDROCHLORIDE 5 MG/1
5-10 TABLET ORAL EVERY 4 HOURS PRN
Status: DISCONTINUED | OUTPATIENT
Start: 2021-06-04 | End: 2021-06-09 | Stop reason: HOSPADM

## 2021-06-04 RX ADMIN — HYDROXYZINE HYDROCHLORIDE 25 MG: 25 TABLET, FILM COATED ORAL at 07:43

## 2021-06-04 RX ADMIN — GABAPENTIN 900 MG: 300 CAPSULE ORAL at 07:41

## 2021-06-04 RX ADMIN — Medication 25 MCG: at 07:42

## 2021-06-04 RX ADMIN — DICYCLOMINE HYDROCHLORIDE 20 MG: 20 TABLET ORAL at 07:42

## 2021-06-04 RX ADMIN — OXYCODONE HYDROCHLORIDE 5 MG: 5 TABLET ORAL at 04:56

## 2021-06-04 RX ADMIN — METHYLPREDNISOLONE SODIUM SUCCINATE 31.25 MG: 125 INJECTION, POWDER, FOR SOLUTION INTRAMUSCULAR; INTRAVENOUS at 15:41

## 2021-06-04 RX ADMIN — DICYCLOMINE HYDROCHLORIDE 20 MG: 20 TABLET ORAL at 19:04

## 2021-06-04 RX ADMIN — AMLODIPINE BESYLATE 5 MG: 5 TABLET ORAL at 07:53

## 2021-06-04 RX ADMIN — PANTOPRAZOLE SODIUM 40 MG: 40 TABLET, DELAYED RELEASE ORAL at 07:42

## 2021-06-04 RX ADMIN — HYDROMORPHONE HYDROCHLORIDE 0.5 MG: 1 INJECTION, SOLUTION INTRAMUSCULAR; INTRAVENOUS; SUBCUTANEOUS at 09:47

## 2021-06-04 RX ADMIN — HYDROMORPHONE HYDROCHLORIDE 0.5 MG: 1 INJECTION, SOLUTION INTRAMUSCULAR; INTRAVENOUS; SUBCUTANEOUS at 15:40

## 2021-06-04 RX ADMIN — DICYCLOMINE HYDROCHLORIDE 20 MG: 20 TABLET ORAL at 12:24

## 2021-06-04 RX ADMIN — PREDNISONE 30 MG: 20 TABLET ORAL at 07:42

## 2021-06-04 RX ADMIN — VENLAFAXINE HYDROCHLORIDE 225 MG: 225 TABLET, EXTENDED RELEASE ORAL at 07:42

## 2021-06-04 RX ADMIN — OXYCODONE HYDROCHLORIDE 10 MG: 5 TABLET ORAL at 23:08

## 2021-06-04 RX ADMIN — MULTIPLE VITAMINS W/ MINERALS TAB 1 TABLET: TAB at 13:47

## 2021-06-04 RX ADMIN — SULFAMETHOXAZOLE AND TRIMETHOPRIM 1 TABLET: 800; 160 TABLET ORAL at 07:42

## 2021-06-04 RX ADMIN — HYDROCORTISONE 1 ENEMA: 100 ENEMA RECTAL at 20:17

## 2021-06-04 RX ADMIN — OXYCODONE HYDROCHLORIDE 10 MG: 5 TABLET ORAL at 13:47

## 2021-06-04 RX ADMIN — GABAPENTIN 900 MG: 300 CAPSULE ORAL at 12:24

## 2021-06-04 RX ADMIN — Medication 1 TABLET: at 07:42

## 2021-06-04 RX ADMIN — OXYCODONE HYDROCHLORIDE 10 MG: 5 TABLET ORAL at 19:04

## 2021-06-04 RX ADMIN — ROSUVASTATIN CALCIUM 10 MG: 10 TABLET, FILM COATED ORAL at 07:42

## 2021-06-04 RX ADMIN — HYDROMORPHONE HYDROCHLORIDE 0.5 MG: 1 INJECTION, SOLUTION INTRAMUSCULAR; INTRAVENOUS; SUBCUTANEOUS at 12:24

## 2021-06-04 RX ADMIN — Medication 5 MG: at 23:08

## 2021-06-04 RX ADMIN — INSULIN ASPART 1 UNITS: 100 INJECTION, SOLUTION INTRAVENOUS; SUBCUTANEOUS at 19:16

## 2021-06-04 RX ADMIN — HYDROXYZINE HYDROCHLORIDE 25 MG: 25 TABLET, FILM COATED ORAL at 21:34

## 2021-06-04 RX ADMIN — Medication 1 TABLET: at 20:16

## 2021-06-04 RX ADMIN — ACETAMINOPHEN 1000 MG: 500 TABLET, FILM COATED ORAL at 04:56

## 2021-06-04 RX ADMIN — ENOXAPARIN SODIUM 40 MG: 40 INJECTION SUBCUTANEOUS at 13:48

## 2021-06-04 RX ADMIN — GABAPENTIN 900 MG: 300 CAPSULE ORAL at 15:40

## 2021-06-04 RX ADMIN — GABAPENTIN 900 MG: 300 CAPSULE ORAL at 20:16

## 2021-06-04 ASSESSMENT — ACTIVITIES OF DAILY LIVING (ADL)
ADLS_ACUITY_SCORE: 13

## 2021-06-04 ASSESSMENT — VISUAL ACUITY: OU: BASELINE

## 2021-06-04 NOTE — PLAN OF CARE
Pt here for UC flare-up, vss, neuros include: a/o x4, N/T in hands. PIV SL, clear liquid diet, voiding spont, small bloody/mucous stools, up ad miriam. PRN pain meds provided with better relief, please continue to give pt her pain meds when available, including @ night. Plan for GI and colorectal surgery to see patient and make for possible surgery plan. Continue to monitor per orders.

## 2021-06-04 NOTE — ED NOTES
[facility]   ED Nurse to Floor Handoff     Doretha Fernandez is a 46 year old female who speaks English and lives alone,  in a home  They arrived in the ED by car from home    ED Chief Complaint: Rectal Bleeding and Abdominal Pain    ED Dx;   Final diagnoses:   Ulcerative colitis without complications, unspecified location (H)         Needed?: No    Allergies:   Allergies   Allergen Reactions     Bee Venom Swelling     Azithromycin Diarrhea     Erythromycin      Other reaction(s): GI intolerance, Vomiting     Fentanyl Other (See Comments)     sweating  sweating     Prochlorperazine Fatigue     Other reaction(s): Other (see comments)  Fatigue     Buspirone      Other reaction(s): GI intolerance  vomiting     Erythrocin Nausea and Vomiting     Zithromax [Azithromycin Dihydrate] Diarrhea     Enbrel [Etanercept] Hives and Rash   .  Past Medical Hx:   Past Medical History:   Diagnosis Date     Abnormal MRI     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      Anxiety      Basal cell carcinoma      Cervical high risk HPV (human papillomavirus) test positive 2019    See problem list     Colitis      Depression      Diabetes mellitus, iatrogenic (H) 2020     Inflammatory arthritis      Insomnia      Intestinal giardiasis 3/5/2018     Lumbago     left lower back pain     Lymphedema      Malignant melanoma (H)      Melanoma (H) 10/23/2017     Mild persistent asthma      Obesity      STORMY (obstructive sleep apnea)      Prothrombin deficiency (H)     takes 81mg asa daily     Stroke (cerebrum) (H)     During      TIA (transient ischemic attack)      Type 2 diabetes mellitus (H)       Baseline Mental status: WDL  Current Mental Status changes: at basesline    Infection present or suspected this encounter: no  Sepsis suspected: No  Isolation type: Enteric  Patient tested for COVID 19 prior to admission: YES     Activity level -  Baseline/Home:  Independent  Activity Level - Current:   Independent    Bariatric equipment needed?: No    In the ED these meds were given:   Medications   acetaminophen (TYLENOL) tablet 1,000 mg (1,000 mg Oral Given 6/3/21 1956)   amLODIPine (NORVASC) tablet 5 mg (has no administration in time range)   calcium carbonate (TUMS) chewable tablet 500 mg (has no administration in time range)   dicyclomine (BENTYL) tablet 20 mg (20 mg Oral Given 6/3/21 2039)   ferrous fumarate 65 mg ([place]. FE)-Vitamin C 125 mg (VITRON C) tablet 1 tablet (has no administration in time range)   hydrOXYzine (ATARAX) tablet 25 mg (25 mg Oral Given 6/3/21 2142)   ondansetron (ZOFRAN) tablet 8 mg (8 mg Oral Given 6/3/21 2000)   oxyCODONE (ROXICODONE) tablet 5 mg (5 mg Oral Given 6/3/21 1956)   pantoprazole (PROTONIX) EC tablet 40 mg (has no administration in time range)   rosuvastatin (CRESTOR) tablet 10 mg (has no administration in time range)   sulfamethoxazole-trimethoprim (BACTRIM DS) 800-160 MG per tablet 1 tablet (has no administration in time range)   venlafaxine (EFFEXOR-ER) 24 hr tablet 225 mg (has no administration in time range)   Vitamin D3 (CHOLECALCIFEROL) tablet 25 mcg (has no administration in time range)   lidocaine 1 % 0.1-1 mL (has no administration in time range)   lidocaine (LMX4) cream (has no administration in time range)   sodium chloride (PF) 0.9% PF flush 3 mL (3 mLs Intracatheter Not Given 6/3/21 1941)   sodium chloride (PF) 0.9% PF flush 3 mL (has no administration in time range)   melatonin tablet 1 mg (has no administration in time range)   HYDROmorphone (PF) (DILAUDID) injection 0.5 mg (has no administration in time range)   predniSONE (DELTASONE) tablet 30 mg (has no administration in time range)   gabapentin (NEURONTIN) capsule 900 mg (900 mg Oral Given 6/3/21 2142)   glucose gel 15-30 g (has no administration in time range)     Or   dextrose 50 % injection 25-50 mL (has no administration in time range)     Or    glucagon injection 1 mg (has no administration in time range)   insulin aspart (NovoLOG) injection (RAPID ACTING) (has no administration in time range)   insulin aspart (NovoLOG) injection (RAPID ACTING) (1 Units Subcutaneous Not Given 6/3/21 2145)   calcium carbonate 600 mg-vitamin D 400 units (CALTRATE) per tablet 1 tablet (has no administration in time range)   ketorolac (TORADOL) injection 15 mg (15 mg Intravenous Given 6/3/21 1245)   iopamidol (ISOVUE-370) solution 135 mL (135 mLs Intravenous Given 6/3/21 1410)   sodium chloride (PF) 0.9% PF flush 84 mL (84 mLs Intravenous Given 6/3/21 1410)   0.9% sodium chloride BOLUS (0 mLs Intravenous Stopped 6/3/21 1955)   oxyCODONE (ROXICODONE) tablet 5 mg (5 mg Oral Given 6/3/21 1647)   HYDROmorphone (PF) (DILAUDID) injection 0.5 mg (0.5 mg Intravenous Given 6/3/21 1903)       Drips running?  No    Home pump  No    Current LDAs  Peripheral IV 06/03/21 Right Lower forearm (Active)   Number of days: 1       Labs results:   Labs Ordered and Resulted from Time of ED Arrival Up to the Time of Departure from the ED   CBC WITH PLATELETS DIFFERENTIAL - Abnormal; Notable for the following components:       Result Value    WBC 12.3 (*)     Nucleated RBCs 1 (*)     Absolute Promyeloctyes 0.2 (*)     All other components within normal limits   COMPREHENSIVE METABOLIC PANEL - Abnormal; Notable for the following components:    Glucose 116 (*)     Protein Total 6.7 (*)     All other components within normal limits   LIPASE - Abnormal; Notable for the following components:    Lipase 53 (*)     All other components within normal limits   ROUTINE UA WITH MICROSCOPIC - Abnormal; Notable for the following components:    Blood Urine Trace (*)     Protein Albumin Urine 10 (*)     Leukocyte Esterase Urine Trace (*)     RBC Urine 3 (*)     Squamous Epithelial /HPF Urine 10 (*)     Mucous Urine Present (*)     All other components within normal limits   CRP INFLAMMATION - Abnormal; Notable for  the following components:    CRP Inflammation 25.0 (*)     All other components within normal limits   GLUCOSE BY METER - Abnormal; Notable for the following components:    Glucose 125 (*)     All other components within normal limits   SARS-COV-2 (COVID-19) VIRUS RT-PCR   ERYTHROCYTE SEDIMENTATION RATE AUTO   GLUCOSE MONITOR NURSING POCT   GLUCOSE MONITOR NURSING POCT   GLUCOSE MONITOR NURSING POCT   GLUCOSE MONITOR NURSING POCT   GLUCOSE MONITOR NURSING POCT   GLUCOSE MONITOR NURSING POCT   IP ASSIGN PROVIDER TEAM TO TREATMENT TEAM   VITAL SIGNS   PERIPHERAL IV CATHETER   APPLY PNEUMATIC COMPRESSION DEVICE (PCD)   IP CARBOHYDRATE COUNTING BY NURSING   PATIENT EDUCATION   ASSESS FOR HYPOGLYCEMIA SYMPTOMS   NOTIFY PHYSICIAN   URINE CULTURE AEROBIC BACTERIAL   ENTERIC BACTERIA AND VIRUS PANEL BY ZENA STOOL   CLOSTRIDIUM DIFFICILE TOXIN B       Imaging Studies:   Recent Results (from the past 24 hour(s))   CT Abdomen Pelvis w Contrast    Narrative    CT abdomen pelvis with contrast    Indication left lower quadrant abdominal pain    COMPARISON: Thyroid 821    Contrast: 135 mL intravenous Isovue-370    FINDINGS: The included lower chest appears normal.  Within the abdomen and pelvis there is mildly homogeneous appearance  of low density throughout the liver without focal mass or ductal  dilation, suggestive of mild steatosis. The spleen appears normal.  Bilateral adrenal glands appear normal. Bilateral kidneys appear  normal. Atrophy of the body and tail of the pancreas is noted. No  masses in the head region and there is no ductal dilation. Gallbladder  appears grossly normal. No enlarged mesenteric or retroperitoneal  lymph nodes. The abdominal aorta and IVC are normal in size. Urinary  bladder appears grossly normal. Prominent adnexal hypodensities are  present, the left measures 3.5 cm in greatest dimension and the right  measures 2.6 cm in greatest dimension, likely subtly prominent ovaries  with possible  "follicles. Consider pelvic ultrasound. No free fluid. No  enlarged inguinal or deep pelvic lymph nodes.  Bones show mild degenerative changes in the thoracolumbar spine there  is no suspicious sclerotic or lytic/destructive lesion. Sigmoid colon  is redundant and there is diverticulosis without inflammatory change.      Impression    IMPRESSION: Prominent adnexal structures which may indicate ovarian  follicles, consider pelvic ultrasound. Redundant sigmoid colon with  diverticulosis. No evidence of inflammation. Thoracolumbar  spondylosis. Extensive fatty atrophy of the distal body as well as  tail portions of the pancreas.    STACY ADAME MD       Recent vital signs:   /69   Pulse 78   Temp 97.7  F (36.5  C) (Oral)   Resp 18   Ht 1.6 m (5' 3\")   Wt 112 kg (247 lb)   SpO2 93%   BMI 43.75 kg/m      Gisela Coma Scale Score: 15 (06/04/21 0445)       Cardiac Rhythm: Normal Sinus  Pt needs tele? No  Skin/wound Issues: have not had a chance to complete skin assessment    Code Status: Full Code    Pain control: good    Nausea control: good    Abnormal labs/tests/findings requiring intervention: none    Family present during ED course? No   Family Comments/Social Situation comments:     Tasks needing completion: None    Cathy Bray, RN  [phone] [facility]    "

## 2021-06-04 NOTE — PROGRESS NOTES
CLINICAL NUTRITION SERVICES - ASSESSMENT NOTE     Nutrition Prescription    RECOMMENDATIONS FOR MDs/PROVIDERS TO ORDER:  - Advance diet as able to low fiber   - Encourage oral intake- if unable to advance diet with out exacerbation of diarrhea, recommend considering nutrition support  - Consider checking fecal elastase due to low lipase   - Consider starting general MVI in addition to calcium + D supplement     Malnutrition Status:    Patient does not meet two of the established criteria necessary for diagnosing malnutrition but is at risk for malnutrition    Recommendations already ordered by Registered Dietitian (RD):  Gelatein (pineapple) at 10am and 2pm snack    Future/Additional Recommendations:  If total bowel rest is indicated, recommend starting parenteral nutrition   --Use dosing weight 67 kg  --Begin CPN, goal volume 1440 ml/day with initial 140g Dex daily (476 kcal, GIR 1.5), 100g AA daily (400 kcal), and 250 ml 20% IV lipids 5x per week.  Micro/Rx: Infuvite and MTE4  --ONLY once pt receives ~100% of initial continuous PN volume with K+/Mg++/Phos WNL, advance PN dex by 35 g every 1-2 days (pending lytes/Glu and Pharm D/RD discretion) to initial goal of 175g Dex (595 kcal) to increase provisions to 1352 kcals (20 kcal/kg/day), 1.5 g PRO/kg/day, GIR 1.8 with 26% kcals from Fat.       REASON FOR ASSESSMENT  Doretha Fernandez is a/an 46 year old female assessed by the dietitian for Admission Nutrition Risk Screen for positive- unsure weight loss, decreased appetite     Clinical History: metastatic malignant melanoma in complete remission, adjuvant immunotherapy induced severe colitis on Vedolizumab and prednisone taper (w/ possible plans for colectomy in the future), steroid induced DM2, and chronic pain syndrome who presented to the ED with complaints of rectal bleeding and abdominal pain thought to be secondary to UC flare.     NUTRITION HISTORY  Pt reports that she's been following a low fiber diet at home  "and usually eats peanut butter, white bread, chicken, and orange gelatein. Doretha reports that these foods still \"runs through her.\" She's tried different lactose free products but that didn't seem to help. Doretha prefers the pineapple flavor of gelatein.     CURRENT NUTRITION ORDERS  Diet: Clear Liquid  Intake/Tolerance: Doretha has had water so far this admit.     LABS  Labs reviewed  Lipase 53 (L)  CRP 58 (H)    MEDICATIONS  Medications reviewed  Caltrate  Vitron C  Novolog  Prednisone  Vit D 1,000 units    ANTHROPOMETRICS  Height: 160 cm (5' 3\")  Most Recent Weight: 112 kg (247 lb)    IBW: 52.3 kg (214%)  BMI: Obesity Grade III BMI >40  Weight History: Weight loss of 3.7 kg (3%) over the past 2 months, weight loss does not meet criteria for malnutrition.   Wt Readings from Last 10 Encounters:   06/03/21 112 kg (247 lb)   05/12/21 112 kg (247 lb)   05/10/21 113.9 kg (251 lb 3.2 oz)   04/27/21 112.6 kg (248 lb 3.2 oz)   04/13/21 115.7 kg (255 lb)   04/05/21 115.7 kg (255 lb)   02/18/21 117 kg (258 lb)   09/23/20 112.4 kg (247 lb 12.8 oz)   09/06/20 114.8 kg (253 lb)   08/27/20 114.9 kg (253 lb 6.4 oz)     Dosing Weight: 67 kg (adjusted based on admit wt of 112 kg and IBW of 52.3 kg)    ASSESSED NUTRITION NEEDS  Estimated Energy Needs: 6360-0493 kcals/day (20 - 25 kcals/kg)  Justification: Maintenance  Estimated Protein Needs:  grams protein/day (1.2 - 1.5 grams of pro/kg)  Justification: Hypercatabolism with acute illness  Estimated Fluid Needs: (1 mL/kcal)   Justification: Maintenance    PHYSICAL FINDINGS  See malnutrition section below.    MALNUTRITION  % Intake: </=75% for >/= 1 month (severe)  % Weight Loss: Weight loss does not meet criteria  Subcutaneous Fat Loss: None observed  Muscle Loss: None observed  Fluid Accumulation/Edema: None noted  Malnutrition Diagnosis: Patient does not meet two of the established criteria necessary for diagnosing malnutrition but is at risk for malnutrition    NUTRITION " DIAGNOSIS  Inadequate oral intake related to altered GI function as evidenced by inability to consume adequate PO intake 2/2 colitis flare and current clear liquid diet       INTERVENTIONS  Implementation  Nutrition Education: Discussed current PO intake/appetite and role of RD.    Medical food supplement therapy: Gelatein BID     Goals  Diet adv v nutrition support within 2-3 days.     Monitoring/Evaluation  Progress toward goals will be monitored and evaluated per protocol.    Dianna Orozco RD, LD  6A/7D RD pager [phone]

## 2021-06-04 NOTE — CONSULTS
Colon and Rectal Surgery Consultation Note  [facility]    Doretha Fernandez MRN# [id number]   Age: 46 year old YOB: 1975     Date of Admission:  6/3/2021    Reason for consult: Refractory UC, history of melanoma in remission       Requesting physician: Dr. Wood       Level of consult: Consult and follow for daily recommendations           Assessment:   45 y/o F, PMH of melanoma unknown primary (2017), s/p left axillary LN dissection (2017, Dr. Cool) in remission, history of adjuvant nivolumab induced colitis in 2018 treated with remicade (x1 at [facility]) and Entyvio, h/o c.diff (recent bout, started PO vanc 4/7), seronegative RA on prednisone & tocilizumab, steroid induced DM, history of cushings, chronic pain, relatively new diagnosis of UC, received Entyvio x2 (most recent infusions on 5/12, 5/26), mostly steroid dependent x3 years.     Presents with hematochezia, acute on chronic abdominal & rectal pain, nausea.  CRS consulted for consideration for colectomy due to refractory UC (failing entyvio, chronic steroids).  Hgb stable at 12-13.  Also concern is long term management with UC medications & increasing her risk of recurrent melanoma from these typical agents.      Albumin 3.2.  CRP 58.  C.diff negative on 6/4.  Vitals and Abdominal exam stable.          Recommendations:     - greatly appreciate Medicine and GI management  - strict in and outs.  Please have pt document her BMs and percentages of blood vs stools.   - no indication for acute surgical intervention.   - if agreeable to surgery, would recommend a total abdominal colectomy with end ileostomy.  Briefly discussed j-pouch & the need for weight loss prior to assessing candidacy for j-pouch.    - recommend WOCN consult for ileostomy education (ordered)  - from CRS perspective recommend DVT ppx with lovenox for UC.  Hgb stable.  But defer to Medicine and GI.   - recommend continued follow up in CRS clinic to continue  discussing elective surgery   - discussed with GI and Medicine         History of Present Illness:   CC: abdominal pain, hematochezia     Pt has been feeling very poorly.  Quality of life has been poor.  Last actual BM with stool was Saturday 5/30.  This was large volume and smells like c.diff.  Typically passes some blood with mucus followed by stool.  Then vacillates between this through out the day.  However has had only blood since this past weekend.  Thinks that maybe baseline frequency is about 7-8 times per day.  Since Wednesday has had 10 or more stools and again all blood/clots.  Pt states that the amount can vary but can be as much as a cup.  She cannot tell any difference after receiving the entyvio.  Typically has chronic left sided abdominal pain, but last few days, abdominal pain is now diffuse throughout entire abdomen and reports some left arm discomfort similar to her lymph node dissection discomfort. Does have chronic lymphedema of left arm.      Pt reports some nausea but not terrible.  Has been eating well up until this Wed 6/9.  Is on steroids which increases her appetite.  Pt reports that she typically eats sandwiches, peanut butter or ham sandwiches.  Does also take about 1 ensure per day pretty regularly and then periodically takes the Gelatin protein a few times a week.  Pt has gained quite a bit of weight from steroids.     Currently feels cushingoid.  Last time she felt like this was about 3 years ago.  But feels very swollen with neck hump and thinning breast skin.  This started about a week ago.  Pt denies chest pain, shortness of breath, headaches.  Pt denies any known blood clots.  Denies fevers but does feel sweaty at times.  Has not taken her own temperature.      Discussed typical 3 stage approach.  Pt is worried about having a permanent stoma and does not want a permanent stoma.  But pt is also worried about having frequent stools with J-pouch.  Currently is not always able to  "control stool/flatus, but also unclear if that is due to UC versus pelvic floor function.      Discussed with GI.  Pt currently is failing entyvio.  There is also the concern of long term need of UC maintenance medications and increasing her risk of inducing recurrent melanoma.     Last endoscopic evaluation on 2021:  Severe [place]ter score 3 UC.  Inflammation was continuous and circumferential from rectum to descending colon.      Prior surgeries:   x2 (children currently aged 14 & 18).  Pt reports complications after both c-sections.  First  pt reports some type of \"ischemic\" event and left sided weakness and was placed on aspirin after.  Second  had internal bleeding.  Both c-sections were at [facility].  Pt also reports some type of \"thrombosis diagnosis.\"  Pt also had perforated appendicitis and had an open appendectomy.  Pt also had lymph node dissection for her melanoma.  Also mohs on left eye lid.   Per chart review, pt had a TIA after first .            Past Medical History:     Past Medical History:   Diagnosis Date     Abnormal MRI     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      Anxiety      Basal cell carcinoma      Cervical high risk HPV (human papillomavirus) test positive 2019    See problem list     Colitis      Depression      Diabetes mellitus, iatrogenic (H) 2020     Inflammatory arthritis      Insomnia      Intestinal giardiasis 3/5/2018     Lumbago     left lower back pain     Lymphedema      Malignant melanoma (H)      Melanoma (H) 10/23/2017     Mild persistent asthma      Obesity      STORMY (obstructive sleep apnea)      Prothrombin deficiency (H)     takes 81mg asa daily     Stroke (cerebrum) (H)     During      TIA (transient ischemic attack)      Type 2 diabetes mellitus (H)       TIA after first   Reviewed chart & discussed with patient.          Past Surgical History:     Past Surgical History: "   Procedure Laterality Date     APPENDECTOMY       COLONOSCOPY N/A 10/18/2017    Procedure: COLONOSCOPY;  Colon;  Surgeon: Debbie Stephens MD;  [facility]     COLONOSCOPY N/A 3/9/2018    Procedure: COMBINED COLONOSCOPY, SINGLE OR MULTIPLE BIOPSY/POLYPECTOMY BY BIOPSY;  colon;  Surgeon: Benita Schumacher MD;  [facility]     COLONOSCOPY      multiple since  to present - about 6 total     DISSECT LYMPH NODE AXILLA Left 10/23/2017    Procedure: DISSECT LYMPH NODE AXILLA;  Left Axillary Lymph Node Dissection ;  Surgeon: Laurent Cool MD;  [facility]     EXAM UNDER ANESTHESIA PELVIC N/A 2020    Procedure: EXAM UNDER ANESTHESIA, PELVIS; with Cervical Biopsies, Vaginal Biopsy and Endocervical Curettings;  Surgeon: Melina Jung MD;  [facility] OR     GYN SURGERY  ,          REPAIR MOHS Left 2017    Procedure: REPAIR MOHS;  Left Upper Lid Moh's Reconstruction;  Surgeon: Kisha Bosch MD;  [facility]      reviewed chart & discussed with patient.          Social History:     Social History     Socioeconomic History     Marital status:      Spouse name: Not on file     Number of children: Not on file     Years of education: Not on file     Highest education level: Not on file   Occupational History     Not on file   Social Needs     Financial resource strain: Not on file     Food insecurity     Worry: Not on file     Inability: Not on file     Transportation needs     Medical: Not on file     Non-medical: Not on file   Tobacco Use     Smoking status: Former Smoker     Packs/day: 1.00     Years: 5.00     Pack years: 5.00     Quit date: 3/20/1998     Years since quittin.2     Smokeless tobacco: Never Used   Substance and Sexual Activity     Alcohol use: Yes     Comment: occ     Drug use: No     Sexual activity: Not Currently     Partners: Male     Birth control/protection: Surgical   Lifestyle     Physical activity     Days per week: Not on file      Minutes per session: Not on file     Stress: Not on file   Relationships     Social connections     Talks on phone: Not on file     Gets together: Not on file     Attends [employer]al service: Not on file     Active member of club or organization: Not on file     Attends meetings of clubs or organizations: Not on file     Relationship status: Not on file     Intimate partner violence     Fear of current or ex partner: Not on file     Emotionally abused: Not on file     Physically abused: Not on file     Forced sexual activity: Not on file   Other Topics Concern     Parent/sibling w/ CABG, MI or angioplasty before 65F 55M? No   Social History Narrative    20 y.o- patient's mother   of lung cancer. She had to take care of her younger sister.    2012- patient's  had a heart attack with stents placed, followed by cardiac rehabilitation    2000 TO 2012  was in [facility] for depression    2013 patient's  went through alcohol rehabilitation at [facility] inpatient            They have attended couple counseling a couple of times and patient went to the family program for chemical dependency.    Patient denies alcohol or drug use and herself            Has 2 children, girls ages 10 and 13     For a while she was working 3 jobs since her  was ill. 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 Reports her job is very stressful.                 Family History:     Family History   Problem Relation Age of Onset     Cancer Mother 45        lung     Neurologic Disorder Mother         epilepsy     Lipids Father      Gastrointestinal Disease Father         diverticulitis      Depression Father      Colitis Father      Colon Cancer Father      Diverticulitis Father      Cancer Maternal Grandmother      Blood Disease Maternal Grandmother         lymphoma      Arthritis Maternal Grandmother      Diabetes Maternal Grandmother      Depression  Maternal Grandmother      Macular Degeneration Maternal Grandmother      Glaucoma Maternal Grandmother      Diabetes Maternal Grandfather      Cerebrovascular Disease Maternal Grandfather      Blood Disease Maternal Grandfather      Heart Disease Maternal Grandfather      Glaucoma Maternal Grandfather      Cancer Paternal Grandmother      Cancer - colorectal Paternal Grandmother      Colitis Paternal Grandmother      Colon Cancer Paternal Grandmother      Diverticulitis Paternal Grandmother      Respiratory Paternal Grandfather         emphysema      Colitis Paternal Grandfather      Colon Cancer Paternal Grandfather      Diverticulitis Paternal Grandfather      Heart Disease Daughter      Asthma Daughter      Depression Sister      Melanoma No family hx of              Allergies:      Allergies   Allergen Reactions     Bee Venom Swelling     Azithromycin Diarrhea     Erythromycin      Other reaction(s): GI intolerance, Vomiting     Fentanyl Other (See Comments)     sweating  sweating     Prochlorperazine Fatigue     Other reaction(s): Other (see comments)  Fatigue     Buspirone      Other reaction(s): GI intolerance  vomiting     Erythrocin Nausea and Vomiting     Zithromax [Azithromycin Dihydrate] Diarrhea     Enbrel [Etanercept] Hives and Rash             Medications:   lidocaine 1 % 9 mL  sodium bicarbonate 8.4 % injection 1 mEq    acetaminophen (TYLENOL) 500 MG tablet, Take 1,000 mg by mouth every 8 hours as needed for mild pain  Calcium Carb-Cholecalciferol 600-800 MG-UNIT TABS, Take 800 mg by mouth every morning (before breakfast)  calcium carbonate (TUMS) 500 MG chewable tablet, Take 1 tablet (500 mg) by mouth 3 times daily as needed for heartburn  dicyclomine (BENTYL) 20 MG tablet, Take 1 tablet (20 mg) by mouth 3 times daily (with meals)  ferrous fumarate 65 mg, [place]. FE,-Vitamin C 125 mg (VITRON-C)  MG TABS tablet, Take 1 tablet by mouth daily  gabapentin (NEURONTIN) 300 MG capsule, TAKE THREE  CAPSULES BY MOUTH FOUR TIMES A DAY  hydrOXYzine (ATARAX) 25 MG tablet, TAKE ONE TABLET BY MOUTH AT BEDTIME (Patient taking differently: As needed)  insulin lispro (HUMALOG KWIKPEN) 100 UNIT/ML (1 unit dial) KWIKPEN, Inject 1-7 Units Subcutaneous 4 times daily (with meals and nightly) Per the insulin sliding scale provided in the discharge summary.  metFORMIN (GLUCOPHAGE-XR) 500 MG 24 hr tablet, Take 1,000 mg by mouth Daily with breakfast.  oxyCODONE (ROXICODONE) 5 MG tablet, Take 1 tablet (5 mg) by mouth every 4 hours as needed for moderate to severe pain  predniSONE (DELTASONE) 10 MG tablet, Take 6 tablets daily for 7 days, then 5 tablets daily for 7 days, then 4 tablets daily for 7 days, then 3 tablets daily for 7 days. Call the GI doctors for refill after that point.  rosuvastatin (CRESTOR) 10 MG tablet, Take 1 tablet (10 mg) by mouth daily  sulfamethoxazole-trimethoprim (BACTRIM DS) 800-160 MG tablet, Take 1 tablet by mouth daily  venlafaxine (EFFEXOR-ER) 225 MG 24 hr tablet, TAKE ONE TABLET BY MOUTH ONCE DAILY  VITAMIN D3 25 MCG (1000 UT) tablet, TAKE THREE TABLETS BY MOUTH ONCE DAILY  ACCU-CHEK GUIDE test strip, USE TO TEST BLOOD SUGAR TWO TIMES A DAY OR AS DIRECTED  Alcohol Swabs (ALCOHOL PREP) 70 % PADS, 1 applicator 3 times daily  BD BLAYNE U/F 32G X 4 MM insulin pen needle, USE AS DIRECTED  blood glucose (NO BRAND SPECIFIED) lancets standard, Use to test blood sugar 3 times daily or as directed.  blood glucose (NO BRAND SPECIFIED) test strip, Use to test blood sugar 3 times daily or as directed.  blood glucose monitoring (ACCU-CHEK FASTCLIX) lancets, USE TWO TIMES A DAY OR AS DIRECTED  blood glucose monitoring (NO BRAND SPECIFIED) meter device kit, Use to test blood sugar 3 times daily or as directed.  ondansetron (ZOFRAN) 8 MG tablet, Take 8 mg by mouth every 8 hours as needed for nausea (Pt has not taken in past 6 months)              Review of Systems:      All other review of systems negative, except for  "what is mentioned above        Physical Exam:   /57 (BP Location: Right arm)   Pulse 88   Temp 97.4  F (36.3  C) (Oral)   Resp 16   Ht 1.6 m (5' 3\")   Wt 112 kg (247 lb)   SpO2 91%   BMI 43.75 kg/m    General: Alert, interactive, NAD.  Rodriguez facies.    Resp: CTAB, normal resp effort.   Cardiac: RRR, NS1,S2  Abdomen: Soft, Obese, sitting in chair.  No rebound or guarding.  Extremities: No obvious joint abnormalities  Skin: Warm and dry, no jaundice or rash  Neuro: A&Ox3, CN 2-12 intact, PATRICE Sanabria, PALYNN   Colon and Rectal Surgery    Pt was seen and discussed with fellow, Dr. Gould.          "

## 2021-06-04 NOTE — PROGRESS NOTES
[facility]    Internal Medicine Progress Note - [place] 2 Service    Main Plans for Today   - CRP elevated at 25, repeat at 58. Trend daily.   - Sed rate within normal limits.   - Per GI, continue prednisone taper.   - Ongoing discussion with surgery about timing for possible colectomy.   - Pain management with Oxycodone 5-10 mg Q4H PRN and Dilaudid 0.5mg Q2H PRN   - Hydrocortisone enema 100 mg rectally BID     Assessment & Plan     Assessment:  Doretha Fernandez is a 47 yo F with PMH of metastatic malignant melanoma in complete remission, adjuvant immunotherapy induced severe colitis on Vedolizumab and prednisone taper (w/ possible plans for colectomy in the future), steroid induced DM2, and chronic pain syndrome who presented to the ED with complaints of rectal bleeding and abdominal pain thought to be secondary to UC flare.      Plan:     Ulcerative Colitis Flare   Chronic Abdominal Pain   Presents with abdominal pain and rectal bleeding likely related to ulcerative colitis flare. States she was diagnosed with UC 3 years ago as a complication of immunotherapy and was symptom free for several years. However, within the past 3 months, she developed strep pharyngitis and was treated with antibiotics. She ultimately developed C.Diff as a complication of her antibiotics and her UC flares have been recurring since then. She has tried steroid tapers multiple times and two Entyvio infusions without relief. She is strongly considering colectomy. Patient has met with GI team and is interested in moving forward with surgery. Date to be determined.   - GI on board, appreciate recs   - PTA Bentyl 20mg PO TID   - PTA Bactrim 800/160mg PO daily for ppx while on steroids     Pain Management:   - Increased PTA Oxycodone to 5-10mg PO Q4H PRN   - Dilaudid 0.5mg Q2H PRN   - PTA Tylenol 1000mg PO Q8H PRN   - PTA Gabapentin 300mg PO QID     Nausea Management:   - PTA Zofran 8mg PO Q8H PRN       Continue Prednisone Taper as follows:               - 60mg PO daily for 7 days               - 50mg PO daily for 7 days               - 40mg PO daily for 7 days               - 30 mg PO daily for 7 days (pt is on day one; end date: 6/10/21)               - 20 mg PO daily for 7 days               - 10 mg PO daily for 7 days      Steroid Induced DM2   - PTA insulin lispro 1-7 units QID with meals   - No PTA basal insulin   - Med dose sliding scale insulin   - Holding PTA Metformin in setting of possible imaging      Chronic Problems:      HTN   HLD   - PTA Amlodipine 5mg PO daily   - PTA Rosuvastatin 10mg PO daily      GERD   Unclear why patient is on both Famotidine and Pantoprazole. Will continue Pantoprazole for now.   - hold PTA Famotidine 10mg PO BID   - PTA Pantoprazole 40mg PO every day       Iron Deficiency Anemia, resolved   Hgb stable at 13.0.   - PTA iron/vitamin C tablet 65-125mg PO every day      Anxiety   - PTA Atarax 25mg PO TID      MDD   - PTA Venlafaxine 225 mg PO daily      Vitamin D Deficiency   - PTA Vitamin D 25 mcg PO daily         FEN: Advance diet as tolerated   Prophylaxis: Lovenox   Consults: GI consult      Code Status: FULL     Disposition: Anticipate pt's admission to span two midnights or more pending further workup.     Patient staffed with Dr. Wood.      Abran Castano MD  Internal Medicine, PGY-2  P: [phone]     Interval History   No acute events overnight. Patient reports ongoing pain, but states it is much better than on admission. She really wants to move forward with surgery and would like to know when it can possibly be scheduled.     Physical Exam   Vital Signs: Temp: 96.6  F (35.9  C) Temp src: Oral BP: 107/61 Pulse: 90   Resp: 18 SpO2: 93 % O2 Device: None (Room air)    Weight: 247 lbs 0 oz    General: AAOx3, NAD, obese   Skin: Not jaundiced, no rash, no ecchymoses  HEENT: MMM, PERRLA, EOM intact  CV: RRR, normal S1S2, no murmur, clicks, rubs  Resp: Clear to  auscultation bilaterally, no wheezes, rhonchi  Abd: Obese, Soft, diffusely tender to palpation, BS+, no masses appreciated  Extremities: warm and well perfused, palpable pulses, peripheral edema present bilaterally   Neuro: No lateralizing symptoms or focal neurologic deficits        Data   Recent Labs   Lab 06/04/21  0750 06/03/21  1224   WBC 9.0 12.3*   HGB 12.3 13.0   MCV 96 97    257    139   POTASSIUM 4.1 3.6   CHLORIDE 104 105   CO2 28 28   BUN 18 21   CR 0.72 0.73   ANIONGAP 5 6   PATRICIA 8.3* 9.2   * 116*   ALBUMIN 3.2* 3.5   PROTTOTAL 6.2* 6.7*   BILITOTAL 0.3 0.2   ALKPHOS 54 53   ALT 42 49   AST 12 10   LIPASE  --  53*     Recent Results (from the past 24 hour(s))   CT Abdomen Pelvis w Contrast    Narrative    CT abdomen pelvis with contrast    Indication left lower quadrant abdominal pain    COMPARISON: Thyroid 821    Contrast: 135 mL intravenous [id number]    FINDINGS: The included lower chest appears normal.  Within the abdomen and pelvis there is mildly homogeneous appearance  of low density throughout the liver without focal mass or ductal  dilation, suggestive of mild steatosis. The spleen appears normal.  Bilateral adrenal glands appear normal. Bilateral kidneys appear  normal. Atrophy of the body and tail of the pancreas is noted. No  masses in the head region and there is no ductal dilation. Gallbladder  appears grossly normal. No enlarged mesenteric or retroperitoneal  lymph nodes. The abdominal aorta and IVC are normal in size. Urinary  bladder appears grossly normal. Prominent adnexal hypodensities are  present, the left measures 3.5 cm in greatest dimension and the right  measures 2.6 cm in greatest dimension, likely subtly prominent ovaries  with possible follicles. Consider pelvic ultrasound. No free fluid. No  enlarged inguinal or deep pelvic lymph nodes.  Bones show mild degenerative changes in the thoracolumbar spine there  is no suspicious sclerotic or lytic/destructive  lesion. Sigmoid colon  is redundant and there is diverticulosis without inflammatory change.      Impression    IMPRESSION: Prominent adnexal structures which may indicate ovarian  follicles, consider pelvic ultrasound. Redundant sigmoid colon with  diverticulosis. No evidence of inflammation. Thoracolumbar  spondylosis. Extensive fatty atrophy of the distal body as well as  tail portions of the pancreas.    STACY ADAME MD     - End of Note -

## 2021-06-04 NOTE — PLAN OF CARE
Admitted for ulcerative colitis flair up, c. diff infection. Hx mets malignant melanoma in remission, adjunctive immuno therapy, DM 2 2/2 steroid use, Cushing. VSS on RA. Neuros intact ex. numbness fingers and toes baseline. PIV SL. Clear liquid diet. Up SBA. Voids. 1 Loose bloody BM. Stool sample collected and sent. Oxy/Tylenol/dilaudid for abdominal pain. Plan GI consult today. Continue to monitor.

## 2021-06-04 NOTE — TELEPHONE ENCOUNTER
RECORDS RECEIVED FROM: Discuss Surgery for Ulcerative Colitis   Appt date: 6/10/2021   NOTES STATUS DETAILS   OFFICE NOTE from referring provider  N/A    OFFICE NOTE from other specialist   Care Everywhere / Internal [facility]:  21 - GI OV with KEITH Cardoza  4/10/18 - GI OV with Dr. Petar Saucedo - [facility]:  20 - PCC OV with Dr. Harris    [facility]:  21 - GI OV with Dr. Keenan  3/19/19 - GI OV with Shayla Schoenoff, PA   DISCHARGE SUMMARY from hospital  Care Everywhere / Internal [facility]:  6/3/21 - Admission with Dr. Wood  21 - admission with Dr. Duran  21 - Admission with Dr. Guerrero    [facility]:  5/10/18 - Admission with Dr. Jacobs   DISCHARGE REPORT from the ER Internal [facility]:  20 - ED OV with Dr. Serrano   OPERATIVE REPORT  N/A    MEDICATION LIST Internal    LABS     PFC TESTING Internal    ANAL PAP N/A    BIOPSIES/PATHOLOGY RELATED TO DIAGNOSIS N/A    DIAGNOSTIC PROCEDURES     COLONOSCOPY Care Everywhere / Internal [facility]:  18 - Colonoscopy    [facility]:  3/8/18 - Colonoscopy  10/18/17 - Colonoscopy   UPPER ENDOSCOPY (EGD) Care Everywhere [facility]:  19 - EGD   FLEX SIGMOIDOSCOPY  Care Everywhere / Internal [facility]:  21 - Flexible Sigmoidoscopy    [facility]:  21 - Flexible Sigmoidoscopy  19 - Flexible Sigmoidoscopy  18 - Flexible Sigmoidoscopy   ERCP N/A    IMAGING (DISC & REPORT)      CT  In process / Internal [facility]:  6/3/21 - CT Abd/Pelvis  21 - CT Abd/Pelvis  21 - CT Abd/Pelvis  21 - CT Abd/Pelvis  20 - CT Abd/Pelvis  * More CT Abdomen in Epic/PACs    [facility]:  5/10/18 - CT Abd/Pelvis  18 - CT Abd/Pelvis   MRI N/A    XRAY In process [facility]:  18 - XR Abdomen   ULTRASOUND (ENDOANAL/ENDORECTAL) N/A        Records Requested  21    Facility  [facility]  Fax: [phone]   Outcome * 21 11:26 AM Faxed req to [facility] for images to be pushed to [facility]. - Anna     Action 2021 JTV 9:48AM   Action Taken CSS spoke to Agata  from [facility]. Agata confirms that they did not receive a request for images. Requested patient information was to Agata and she will work on it and try to get the images pushed over through PACS.      Action 06/09/2021 JTV 8:28AM   Action Taken CSS checked PACS if [facility] pushed images through. 3rd request was sent to [facility].      Action 06/14/2021 JTV 8:40AM   Action Taken CSS received Medical Records from [facility] via fax. Patient records were forwarded to imaging for proper processing. CSS attached images in PACS from [facility] for patient.

## 2021-06-04 NOTE — PROVIDER NOTIFICATION
Dr. Schumacher notified that pt c/o tingling to left side of body face to toes and stated sensation different than right side.Md notified that pt c/o pain to left abdomen and up. Continue to monitor per Dr. Schumacher.

## 2021-06-04 NOTE — PROGRESS NOTES
Care Management Follow Up    Length of Stay (days): 1    Expected Discharge Date: 06/07/21     Concerns to be Addressed:   Health Care Directive    Patient plan of care discussed at interdisciplinary rounds: Yes        Additional Information:  SW received MD orders to see for Health Care Directive.    SW met with pt and introduced role of SW.  SW provided health care directive,  Verbal education as to its purpose and instruction in regards to complete the document.  SW offered SW assistance in completing document.  Pt states that she feels comfortable completing the document independently.      Pt also indicates that she has short term disability paperwork from her employer (in her cell phone) that needs completion .  Due to the time of day, SW advised that this be addressed on Monday to prevent paperwork from becoming lost over the weekend and as it is unlikely that MD will complete the documents this late in the day.  Pt voices agreement.    SW will follow up to arrange [facility] service when HCD complete and to facilitate completion of disability paperwork from pt's employer.    DELIA Blair  Social Work, 6A  Phone:  [phone]  Pager:  [phone]  6/4/2021          MARYANA Miller

## 2021-06-04 NOTE — TELEPHONE ENCOUNTER
"Prescription approved per [facility] Refill Protocol.  Emilia Kemp RN    Requested Prescriptions   Pending Prescriptions Disp Refills     amLODIPine (NORVASC) 5 MG tablet 30 tablet 0     Sig: Take 1 tablet (5 mg) by mouth daily       Calcium Channel Blockers Protocol  Passed - 6/3/2021  5:48 PM        Passed - Blood pressure under 140/90 in past 12 months     BP Readings from Last 3 Encounters:   06/04/21 105/57   05/26/21 114/65   05/12/21 115/58                 Passed - Recent (12 mo) or future (30 days) visit within the authorizing provider's specialty     Patient has had an office visit with the authorizing provider or a provider within the authorizing providers department within the previous 12 mos or has a future within next 30 days. See \"Patient Info\" tab in inbasket, or \"Choose Columns\" in Meds & Orders section of the refill encounter.              Passed - Medication is active on med list        Passed - Patient is age 18 or older        Passed - No active pregnancy on record        Passed - Normal serum creatinine on file in past 12 months     Recent Labs   Lab Test 06/04/21  0750   CR 0.72       Ok to refill medication if creatinine is low          Passed - No positive pregnancy test in past 12 months           pantoprazole (PROTONIX) 40 MG EC tablet 30 tablet 0     Sig: Take 1 tablet (40 mg) by mouth every morning (before breakfast)       PPI Protocol Passed - 6/3/2021  5:48 PM        Passed - Not on Clopidogrel (unless Pantoprazole ordered)        Passed - No diagnosis of osteoporosis on record        Passed - Recent (12 mo) or future (30 days) visit within the authorizing provider's specialty     Patient has had an office visit with the authorizing provider or a provider within the authorizing providers department within the previous 12 mos or has a future within next 30 days. See \"Patient Info\" tab in inbasket, or \"Choose Columns\" in Meds & Orders section of the refill encounter.              Passed " - Medication is active on med list        Passed - Patient is age 18 or older        Passed - No active pregnacy on record        Passed - No positive pregnancy test in past 12 months

## 2021-06-04 NOTE — CONSULTS
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    UC, proctosigmoid  Hematochezia  Abdominal pain  No leukocytosis. CRP trending up 25 yesterday >> 54 today. She has now failed PO steroid taper twice. Vedolizumab initiated 5/12. CRP, abdominal pain and bloody stool output all worsened. Discussed options of continued medical management versus total colectomy with diverting ileostomy. She is failing vedolizumab presently and Remicade therapy present the risk of recurrent melanoma. Given likelihood of surgery, will not initiate IV steroids at this time prior to further CRS input. She is continuing to have more tenesmus symptoms for which enemas previously helped, will try again for symptom relief. Infectious stool studies negative.     Recommendations  -Start Methylprednisolone 32 mg BID IV (one dose today) with tentative plan for 72 hours of IV steroid and transition back to PO  -Daily CRP (ordered)  -CRS consult (ordered)  -BID Mg enemas  -DVT prophylaxis  -Monitor bm: frequent, # just blood, #of stools (and if containing blood)    GI will continue to follow. Thank you for involving us in this patient's care. Please do not hesitate to contact the GI service with any questions or concerns.     Pt care plan discussed with Dr. Liu, GI staff physician.    AMADA Bustillo CNP  Text page  -------------------------------------------------------------------------------------------------------------------          Chief Complaint:   We were asked by Dr. Wood of internal medicine to evaluate this patient with UC  flare    History is obtained from the patient and the medical record.          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    Discharged on 5/11 with plan for steroid taper and vedolizumab initiation. Was having 5 bm daily at that point, most without blood, improved abdominal pain, but never resolved. Past 5-7 days with significantly more abdominal pain (L > R) with radiation to neck/arm with an associated headache (2/2 receiving fentanyl which she has a known allergy to). Bms 10+ per day the past 3 days, but almost exclusively blood without stool with poor PO. Was having ~5 blood/stool outputs prior to this. Mg enemas provided some relief in hospital, but not at home, possibly d/t difficulty with administering to self. Denies fevers, chills, vomiting. She is nauseous.     Per discussions with Dr. Davey and Dr. Boudreaux over the past few days the options discussed had been: continuing vedolizumab and move back to IV steroids, attempt remicade (max 1-2 doses per her [place] oncologist), or move to total colectomy. She is quite worried about recurrent melanoma and leaning towards colectomy, but she is very anxious about the prospect of surgery as well.    IBD HISTORY  Age at diagnosis: 25  Extent of disease: proctosigmoid   Current UC medications: Vedolizumab and prednisone   Prior UC surgeries: none  Prior IBD Medications:   Infliximab, 1 dose in the hospital good response  Vedolizumab, completed induction dosing  Prednisone, minimal response to high-dose oral and IV with significant weight gain  5-ASA, no response  Topical therapies to include enemas, no response          Past Medical History:   Reviewed and edited as  appropriate  Past Medical History:   Diagnosis Date     Abnormal MRI     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      Anxiety      Basal cell carcinoma      Cervical high risk HPV (human papillomavirus) test positive 2019    See problem list     Colitis      Depression      Diabetes mellitus, iatrogenic (H) 2020     Inflammatory arthritis      Insomnia      Intestinal giardiasis 3/5/2018     Lumbago     left lower back pain     Lymphedema      Malignant melanoma (H)      Melanoma (H) 10/23/2017     Mild persistent asthma      Obesity      STORMY (obstructive sleep apnea)      Prothrombin deficiency (H)     takes 81mg asa daily     Stroke (cerebrum) (H)     During      TIA (transient ischemic attack)      Type 2 diabetes mellitus (H)             Past Surgical History:   Reviewed and edited as appropriate   Past Surgical History:   Procedure Laterality Date     APPENDECTOMY       COLONOSCOPY N/A 10/18/2017    Procedure: COLONOSCOPY;  Colon;  Surgeon: Debbie Stephens MD;  [facility]     COLONOSCOPY N/A 3/9/2018    Procedure: COMBINED COLONOSCOPY, SINGLE OR MULTIPLE BIOPSY/POLYPECTOMY BY BIOPSY;  colon;  Surgeon: Benita Schumacher MD;  Location: [facility] GI     COLONOSCOPY      multiple since  to present - about 6 total     DISSECT LYMPH NODE AXILLA Left 10/23/2017    Procedure: DISSECT LYMPH NODE AXILLA;  Left Axillary Lymph Node Dissection ;  Surgeon: Laurent Cool MD;  Location: [facility] OR     EXAM UNDER ANESTHESIA PELVIC N/A 2020    Procedure: EXAM UNDER ANESTHESIA, PELVIS; with Cervical Biopsies, Vaginal Biopsy and Endocervical Curettings;  Surgeon: Melina Jung MD;  Location: [facility] OR     GYN SURGERY  ,          REPAIR MOHS Left 2017    Procedure: REPAIR MOHS;  Left Upper Lid Moh's Reconstruction;  Surgeon: Kisha Bosch MD;  [facility]            Previous Endoscopy:     Results for orders placed or performed during the hospital  encounter of 03/08/18   COLONOSCOPY   Result Value Ref Range    COLONOSCOPY       [facility] [place]  [address] [place] [address] [phone]     Endoscopy Department  _______________________________________________________________________________  Patient Name: Doretha Fernandez            Procedure Date: 3/9/2018 10:30 AM  MRN: [id number]                       Account Number: [id number]  YOB: 1975              Admit Type: Inpatient  Age: 43                                Gender: Female  Note Status: Finalized                Attending MD: Benita Schumacher MD  Pause for the Cause: performed.       Total Sedation Time: 20 minutes of   continuous 1:1 bedside monitoring performed.  _______________________________________________________________________________     Procedure:           Colonoscopy  Indications:         Hematochezia, 44 yo F wtih hematochezia and diarrhea                        with stool + giardia, currently on Opdivo for metastatic                        melanoma. CT showed left sided colitis.  Providers:           Pj Schumacher MD, Renard Cosme RN  Referring MD:          Medicines:           Midazolam 1.5 mg IV, Meperidine 25 mg IV,                        Diphenhydramine 25 mg IV  Complications:       No immediate complications.  _______________________________________________________________________________  Procedure:           Pre-Anesthesia Assessment:                       - Prior to the procedure, a History and Physical was                        performed, and patient medications and allergies were                        reviewed. The patient is competent. The risks and                        benefits of the procedure and the sedation options and                        risks were discussed with the patient. All questions                        were answered and informed consent was obtained. Patient                        identification and proposed procedure  were verified by                        the physician and the nurse in the pre-procedure area in                        the procedure  room. Mental Status Examination: alert and                        oriented. Airway Examination: normal oropharyngeal                        airway and neck mobility. Respiratory Examination: clear                        to auscultation. CV Examination: normal. Prophylactic                        Antibiotics: The patient does not require prophylactic                        antibiotics. Prior Anticoagulants: The patient has taken                        no previous anticoagulant or antiplatelet agents. ASA                        Grade Assessment: II - A patient with mild systemic                        disease. After reviewing the risks and benefits, the                        patient was deemed in satisfactory condition to undergo                        the procedure. The anesthesia plan was to use moderate                        sedation / analgesia (conscious sedation). Immediately                        prior to administration of medications, the patient was                        re- assessed for adequacy to receive sedatives. The heart                        rate, respiratory rate, oxygen saturations, blood                        pressure, adequacy of pulmonary ventilation, and                        response to care were monitored throughout the                        procedure. The physical status of the patient was                        re-assessed after the procedure.                       After obtaining informed consent, the colonoscope was                        passed under direct vision. Throughout the procedure,                        the patient's blood pressure, pulse, and oxygen                        saturations were monitored continuously. The pediatric                        colonoscope was introduced through the anus and advanced                        to the  transverse colon for evaluation. This was the                        intended extent. Due to poor prep and patient                        discomfort, scope was not advanced further. The                         colonoscopy was performed without difficulty. The                        patient tolerated the procedure well. The quality of the                        bowel preparation was poor, with solid stool throughout                        the exam.                                                                                   Findings:       The perianal and digital rectal examinations were normal.       A diffuse area of moderately congested, erythematous, granular and        vascular-pattern-decreased mucosa was found in the rectum, in the        recto-sigmoid colon, in the sigmoid colon, in the descending colon and        in the transverse colon. There was overlying exudate that could be        washed off. This was most severe in the rectosigmoid area, which        contained patchy areas of petechiae. Biopsies were taken with a cold        forceps for histology (rushed).                                                                                   Impression:           - Preparation of the colon was poor.                       - Congested, erythematous, granular and                        vascular-pattern-decreased mucosa in the rectum, in the                        recto-sigmoid colon, in the sigmoid colon, in the                        descending colon and in the transverse colon. Biopsied.  Recommendation:      - Return patient to hospital [facility] for ongoing care.                       - Resume previous diet.                       - Continue present medications.                       - Await pathology results (rushed).                                                                                     Electronically signed by: Benita Schumacher MD  __________________  Benita Schumacher MD  3/9/2018 12:14:51  PM  I was physically present for the entire viewing portion of the exam.  __________________________  Signature of teaching physician  Gauri/Dominik Schumacher MD  Number of Addenda: 0    Note Initiated On: 3/9/2018 8:16 AM              Social History:   Reviewed and edited as appropriate  Social History     Socioeconomic History     Marital status:      Spouse name: Not on file     Number of children: Not on file     Years of education: Not on file     Highest education level: Not on file   Occupational History     Not on file   Social Needs     Financial resource strain: Not on file     Food insecurity     Worry: Not on file     Inability: Not on file     Transportation needs     Medical: Not on file     Non-medical: Not on file   Tobacco Use     Smoking status: Former Smoker     Packs/day: 1.00     Years: 5.00     Pack years: 5.00     Quit date: 3/20/1998     Years since quittin.2     Smokeless tobacco: Never Used   Substance and Sexual Activity     Alcohol use: Yes     Comment: occ     Drug use: No     Sexual activity: Not Currently     Partners: Male     Birth control/protection: Surgical   Lifestyle     Physical activity     Days per week: Not on file     Minutes per session: Not on file     Stress: Not on file   Relationships     Social connections     Talks on phone: Not on file     Gets together: Not on file     Attends [employer] service: Not on file     Active member of club or organization: Not on file     Attends meetings of clubs or organizations: Not on file     Relationship status: Not on file     Intimate partner violence     Fear of current or ex partner: Not on file     Emotionally abused: Not on file     Physically abused: Not on file     Forced sexual activity: Not on file   Other Topics Concern     Parent/sibling w/ CABG, MI or angioplasty before 65F 55M? No   Social History Narrative    20 y.o- patient's mother   of lung cancer. She had to take care of her younger sister.     7/2012- patient's  had a heart attack with stents placed, followed by cardiac rehabilitation    November 2000 TO December 2012  was in [facility] hospital for depression    January 2013 patient's  went through alcohol rehabilitation at [facility] inpatient            They have attended couple counseling a couple of times and patient went to the family program for chemical dependency.    Patient denies alcohol or drug use and herself            Has 2 children, girls ages 10 and 13     For a while she was working 3 jobs since her  was ill. Works at the [employer] for [employer]. Reports her job is very stressful.                Family History:   Reviewed and edited as appropriate  Family History   Problem Relation Age of Onset     Cancer Mother 45        lung     Neurologic Disorder Mother         epilepsy     Lipids Father      Gastrointestinal Disease Father         diverticulitis      Depression Father      Colitis Father      Colon Cancer Father      Diverticulitis Father      Cancer Maternal Grandmother      Blood Disease Maternal Grandmother         lymphoma      Arthritis Maternal Grandmother      Diabetes Maternal Grandmother      Depression Maternal Grandmother      Macular Degeneration Maternal Grandmother      Glaucoma Maternal Grandmother      Diabetes Maternal Grandfather      Cerebrovascular Disease Maternal Grandfather      Blood Disease Maternal Grandfather      Heart Disease Maternal Grandfather      Glaucoma Maternal Grandfather      Cancer Paternal Grandmother      Cancer - colorectal Paternal Grandmother      Colitis Paternal Grandmother      Colon Cancer Paternal Grandmother      Diverticulitis Paternal Grandmother      Respiratory Paternal Grandfather         emphysema      Colitis Paternal Grandfather      Colon Cancer Paternal Grandfather      Diverticulitis Paternal Grandfather      Heart Disease Daughter      Asthma Daughter       Depression Sister      Melanoma No family hx of             Allergies:   Reviewed and edited as appropriate     Allergies   Allergen Reactions     Bee Venom Swelling     Azithromycin Diarrhea     Erythromycin      Other reaction(s): GI intolerance, Vomiting     Fentanyl Other (See Comments)     sweating  sweating     Prochlorperazine Fatigue     Other reaction(s): Other (see comments)  Fatigue     Buspirone      Other reaction(s): GI intolerance  vomiting     Erythrocin Nausea and Vomiting     Zithromax [Azithromycin Dihydrate] Diarrhea     Enbrel [Etanercept] Hives and Rash            Medications:     Current Facility-Administered Medications   Medication     acetaminophen (TYLENOL) tablet 1,000 mg     amLODIPine (NORVASC) tablet 5 mg     calcium carbonate (TUMS) chewable tablet 500 mg     calcium carbonate 600 mg-vitamin D 400 units (CALTRATE) per tablet 1 tablet     glucose gel 15-30 g    Or     dextrose 50 % injection 25-50 mL    Or     glucagon injection 1 mg     dicyclomine (BENTYL) tablet 20 mg     enoxaparin ANTICOAGULANT (LOVENOX) injection 40 mg     ferrous fumarate 65 mg ([place] FE)-Vitamin C 125 mg (VITRON C) tablet 1 tablet     gabapentin (NEURONTIN) capsule 900 mg     GUMMY VITAMINS & MINERALS chewable tablet 1 tablet     hydrocortisone (CORTENEMA) enema 1 enema     HYDROmorphone (PF) (DILAUDID) injection 0.5 mg     hydrOXYzine (ATARAX) tablet 25 mg     insulin aspart (NovoLOG) injection (RAPID ACTING)     insulin aspart (NovoLOG) injection (RAPID ACTING)     lidocaine (LMX4) cream     lidocaine 1 % 0.1-1 mL     melatonin tablet 1 mg     naloxone (NARCAN) injection 0.2 mg    Or     naloxone (NARCAN) injection 0.4 mg    Or     naloxone (NARCAN) injection 0.2 mg    Or     naloxone (NARCAN) injection 0.4 mg     ondansetron (ZOFRAN) tablet 8 mg     oxyCODONE (ROXICODONE) tablet 5-10 mg     pantoprazole (PROTONIX) EC tablet 40 mg     predniSONE (DELTASONE) tablet 30 mg     rosuvastatin (CRESTOR) tablet 10 mg  "    sodium chloride (PF) 0.9% PF flush 3 mL     sodium chloride (PF) 0.9% PF flush 3 mL     sulfamethoxazole-trimethoprim (BACTRIM DS) 800-160 MG per tablet 1 tablet     venlafaxine (EFFEXOR-ER) 24 hr tablet 225 mg     Vitamin D3 (CHOLECALCIFEROL) tablet 25 mcg     Facility-Administered Medications Ordered in Other Encounters   Medication     lidocaine 1 % 9 mL     sodium bicarbonate 8.4 % injection 1 mEq             Review of Systems:     A complete review of systems was performed and is negative except as noted in the HPI           Physical Exam:   /61 (BP Location: Left arm)   Pulse 90   Temp 96.6  F (35.9  C) (Oral)   Resp 18   Ht 1.6 m (5' 3\")   Wt 112 kg (247 lb)   SpO2 93%   BMI 43.75 kg/m    Wt:   Wt Readings from Last 2 Encounters:   06/03/21 112 kg (247 lb)   05/12/21 112 kg (247 lb)      Constitutional: cooperative, pleasant, not dyspneic/diaphoretic, no acute distress  Eyes: Sclera anicteric/injected  Ears/nose/mouth/throat: Normal oropharynx without ulcers or exudate, mucus membranes moist, hearing intact  Neck: supple without obvious mass  CV: +2 LLE  Respiratory: Unlabored breathing  Lymph: No submandibular, supraclavicular or inguinal lymphadenopathy  Abd: Nondistended, +bs, no hepatosplenomegaly, tender L > R, no peritoneal signs  Skin: warm, perfused, no jaundice  Neuro: AAO x 3  Psych: Normal affect  MSK: No gross deformities         Data:   Labs and imaging below were independently reviewed and interpreted    BMP  Recent Labs   Lab 06/04/21  0750 06/03/21  1224    139   POTASSIUM 4.1 3.6   CHLORIDE 104 105   PATRICIA 8.3* 9.2   CO2 28 28   BUN 18 21   CR 0.72 0.73   * 116*     CBC  Recent Labs   Lab 06/04/21  0750 06/03/21  1224   WBC 9.0 12.3*   RBC 3.99 4.25   HGB 12.3 13.0   HCT 38.4 41.0   MCV 96 97   MCH 30.8 30.6   MCHC 32.0 31.7   RDW 13.4 13.4    257     INRNo lab results found in last 7 days.  LFTs  Recent Labs   Lab 06/04/21  0750 06/03/21  1224   ALKPHOS 54 " 53   AST 12 10   ALT 42 49   BILITOTAL 0.3 0.2   PROTTOTAL 6.2* 6.7*   ALBUMIN 3.2* 3.5      PANC  Recent Labs   Lab 21  1224   LIPASE 53*       Imagin/3/2021 CT ab pelvis    IMPRESSION: Prominent adnexal structures which may indicate ovarian  follicles, consider pelvic ultrasound. Redundant sigmoid colon with  diverticulosis. No evidence of inflammation. Thoracolumbar  spondylosis. Extensive fatty atrophy of the distal body as well as  tail portions of the pancreas.     STACY ADAME MD

## 2021-06-04 NOTE — PHARMACY-ADMISSION MEDICATION HISTORY
Admission Medication History Completed by Pharmacy    See [facility] Admission Navigator for allergy information, preferred outpatient pharmacy, prior to admission medications and immunization status.     Medication History Sources:     Patient    Care Everywhere    Sure Scripts (Fill hx)    Changes made to PTA medication list (reason):    Added: None    Deleted:   o Famotidine 10 mg tablet; Take 1 tablet by mouth 2 times daily (Patient discontinued use by direction from provider)    Changed: None    Additional Information:    Patient was confident in her medications, but we did not go through each medication and directions thoroughly. She states that she took all of her medications yesterday, except her insulin which she took this morning.    Antimicrobial History  Medication Name: Penicillin V 500 mg tablet    Indication: Strep Pharyngitis  Instructions: Take 1 tablet by mouth daily for 10 days  Duration: 7   Patient did not complete the course of antibiotics.  Complications: Improvement in strep symptoms, however, patient developed C.Difficile infection as a result    Medication Name: Vancomycin 125 mg capsule  Indication: C. Difficile infection  Instructions: Take 1 capsule by mouth 4 times a day for C. Difficile  Duration: 14 days  Patient completed the course of antibiotics.    Medication Name: Sulfamethoxazole-trimethoprim 800-160 mg tablet  Indication: Prophylaxis with Prednisone regimen  Instructions: Take 1 tablet by mouth daily  Duration: Continue until off steroids  Patient has not completed the course of antibiotics.      Prior to Admission medications    Medication Sig Last Dose Taking? Auth Provider   acetaminophen (TYLENOL) 500 MG tablet Take 1,000 mg by mouth every 8 hours as needed for mild pain Past Week Yes Unknown, Entered By History   amLODIPine (NORVASC) 5 MG tablet Take 1 tablet (5 mg) by mouth daily 6/2/2021 Yes Howard Abbott MD   Calcium Carb-Cholecalciferol 600-800 MG-UNIT TABS Take  800 mg by mouth every morning (before breakfast) 6/2/2021 Yes Unknown, Entered By History   calcium carbonate (TUMS) 500 MG chewable tablet Take 1 tablet (500 mg) by mouth 3 times daily as needed for heartburn 6/2/2021 Yes Howard Abbott MD   dicyclomine (BENTYL) 20 MG tablet Take 1 tablet (20 mg) by mouth 3 times daily (with meals) 6/2/2021 Yes Howard Abbott MD   ferrous fumarate 65 mg, [place]. FE,-Vitamin C 125 mg (VITRON-C)  MG TABS tablet Take 1 tablet by mouth daily 6/2/2021 Yes Sachin Machado PA   gabapentin (NEURONTIN) 300 MG capsule TAKE THREE CAPSULES BY MOUTH FOUR TIMES A DAY 6/2/2021 Yes Anna Naidu MD   hydrOXYzine (ATARAX) 25 MG tablet TAKE ONE TABLET BY MOUTH AT BEDTIME  Patient taking differently: As needed 6/2/2021 Yes Anna Naidu MD   insulin lispro (HUMALOG KWIKPEN) 100 UNIT/ML (1 unit dial) KWIKPEN Inject 1-7 Units Subcutaneous 4 times daily (with meals and nightly) Per the insulin sliding scale provided in the discharge summary. 6/3/2021 at AM Yes Gita Duran MD   metFORMIN (GLUCOPHAGE-XR) 500 MG 24 hr tablet Take 1,000 mg by mouth Daily with breakfast. 6/2/2021 Yes Unknown, Entered By History   oxyCODONE (ROXICODONE) 5 MG tablet Take 1 tablet (5 mg) by mouth every 4 hours as needed for moderate to severe pain Past Month Yes Gita Duran MD   pantoprazole (PROTONIX) 40 MG EC tablet Take 1 tablet (40 mg) by mouth every morning (before breakfast) 6/2/2021 Yes Howard Abbott MD   predniSONE (DELTASONE) 10 MG tablet Take 6 tablets daily for 7 days, then 5 tablets daily for 7 days, then 4 tablets daily for 7 days, then 3 tablets daily for 7 days. Call the GI doctors for refill after that point. 6/2/2021 Yes Gita Duran MD   rosuvastatin (CRESTOR) 10 MG tablet Take 1 tablet (10 mg) by mouth daily 6/2/2021 Yes Anna Naidu MD   sulfamethoxazole-trimethoprim (BACTRIM DS) 800-160 MG tablet Take 1 tablet by mouth daily 6/2/2021 Yes  Howard Abbott MD   venlafaxine (EFFEXOR-ER) 225 MG 24 hr tablet TAKE ONE TABLET BY MOUTH ONCE DAILY 6/2/2021 Yes Anna Naidu MD   VITAMIN D3 25 MCG (1000 UT) tablet TAKE THREE TABLETS BY MOUTH ONCE DAILY 6/2/2021 Yes Anna Naidu MD   ACCU-CHEK GUIDE test strip USE TO TEST BLOOD SUGAR TWO TIMES A DAY OR AS DIRECTED   Anna Naidu MD   Alcohol Swabs (ALCOHOL PREP) 70 % PADS 1 applicator 3 times daily   Gita Duran MD   BD BLAYNE U/F 32G X 4 MM insulin pen needle USE AS DIRECTED   Anna Naidu MD   blood glucose (NO BRAND SPECIFIED) lancets standard Use to test blood sugar 3 times daily or as directed.   Gita Duran MD   blood glucose (NO BRAND SPECIFIED) test strip Use to test blood sugar 3 times daily or as directed.   Gita Duran MD   blood glucose monitoring (ACCU-CHEK FASTCLIX) lancets USE TWO TIMES A DAY OR AS DIRECTED   Anna Naidu MD   blood glucose monitoring (NO BRAND SPECIFIED) meter device kit Use to test blood sugar 3 times daily or as directed.   Gita Duran MD   ondansetron (ZOFRAN) 8 MG tablet Take 8 mg by mouth every 8 hours as needed for nausea (Pt has not taken in past 6 months) More than a month  Unknown, Entered By History       Date completed: 06/03/21    Medication history completed by: MARIELA Cavazos, Pharmacy Intern

## 2021-06-05 LAB
ANION GAP SERPL CALCULATED.3IONS-SCNC: 7 MMOL/L (ref 3–14)
BUN SERPL-MCNC: 16 MG/DL (ref 7–30)
CALCIUM SERPL-MCNC: 9.1 MG/DL (ref 8.5–10.1)
CHLORIDE SERPL-SCNC: 99 MMOL/L (ref 94–109)
CO2 SERPL-SCNC: 27 MMOL/L (ref 20–32)
CREAT SERPL-MCNC: 0.79 MG/DL (ref 0.52–1.04)
CRP SERPL-MCNC: 41 MG/L (ref 0–8)
ERYTHROCYTE [DISTWIDTH] IN BLOOD BY AUTOMATED COUNT: 12.7 % (ref 10–15)
GFR SERPL CREATININE-BSD FRML MDRD: >90 ML/MIN/{1.73_M2}
GLUCOSE BLDC GLUCOMTR-MCNC: 167 MG/DL (ref 70–99)
GLUCOSE BLDC GLUCOMTR-MCNC: 174 MG/DL (ref 70–99)
GLUCOSE BLDC GLUCOMTR-MCNC: 219 MG/DL (ref 70–99)
GLUCOSE BLDC GLUCOMTR-MCNC: 244 MG/DL (ref 70–99)
GLUCOSE BLDC GLUCOMTR-MCNC: 262 MG/DL (ref 70–99)
GLUCOSE SERPL-MCNC: 254 MG/DL (ref 70–99)
HCT VFR BLD AUTO: 38.5 % (ref 35–47)
HGB BLD-MCNC: 12.7 G/DL (ref 11.7–15.7)
MCH RBC QN AUTO: 30.7 PG (ref 26.5–33)
MCHC RBC AUTO-ENTMCNC: 33 G/DL (ref 31.5–36.5)
MCV RBC AUTO: 93 FL (ref 78–100)
PLATELET # BLD AUTO: 252 10E9/L (ref 150–450)
POTASSIUM SERPL-SCNC: 4.1 MMOL/L (ref 3.4–5.3)
RBC # BLD AUTO: 4.14 10E12/L (ref 3.8–5.2)
SODIUM SERPL-SCNC: 134 MMOL/L (ref 133–144)
WBC # BLD AUTO: 10.3 10E9/L (ref 4–11)

## 2021-06-05 PROCEDURE — 250N000013 HC RX MED GY IP 250 OP 250 PS 637: Performed by: STUDENT IN AN ORGANIZED HEALTH CARE EDUCATION/TRAINING PROGRAM

## 2021-06-05 PROCEDURE — 250N000013 HC RX MED GY IP 250 OP 250 PS 637: Performed by: NURSE PRACTITIONER

## 2021-06-05 PROCEDURE — 120N000002 HC R&B MED SURG/OB UMMC

## 2021-06-05 PROCEDURE — 86140 C-REACTIVE PROTEIN: CPT | Performed by: STUDENT IN AN ORGANIZED HEALTH CARE EDUCATION/TRAINING PROGRAM

## 2021-06-05 PROCEDURE — 250N000013 HC RX MED GY IP 250 OP 250 PS 637: Performed by: INTERNAL MEDICINE

## 2021-06-05 PROCEDURE — 80048 BASIC METABOLIC PNL TOTAL CA: CPT | Performed by: STUDENT IN AN ORGANIZED HEALTH CARE EDUCATION/TRAINING PROGRAM

## 2021-06-05 PROCEDURE — 999N001017 HC STATISTIC GLUCOSE BY METER IP

## 2021-06-05 PROCEDURE — 82656 EL-1 FECAL QUAL/SEMIQ: CPT | Performed by: STUDENT IN AN ORGANIZED HEALTH CARE EDUCATION/TRAINING PROGRAM

## 2021-06-05 PROCEDURE — 36415 COLL VENOUS BLD VENIPUNCTURE: CPT | Performed by: STUDENT IN AN ORGANIZED HEALTH CARE EDUCATION/TRAINING PROGRAM

## 2021-06-05 PROCEDURE — 250N000011 HC RX IP 250 OP 636: Performed by: STUDENT IN AN ORGANIZED HEALTH CARE EDUCATION/TRAINING PROGRAM

## 2021-06-05 PROCEDURE — 85027 COMPLETE CBC AUTOMATED: CPT | Performed by: STUDENT IN AN ORGANIZED HEALTH CARE EDUCATION/TRAINING PROGRAM

## 2021-06-05 PROCEDURE — 99233 SBSQ HOSP IP/OBS HIGH 50: CPT | Mod: GC | Performed by: INTERNAL MEDICINE

## 2021-06-05 RX ORDER — METHYLPREDNISOLONE SODIUM SUCCINATE 40 MG/ML
32 INJECTION, POWDER, LYOPHILIZED, FOR SOLUTION INTRAMUSCULAR; INTRAVENOUS DAILY
Status: DISCONTINUED | OUTPATIENT
Start: 2021-06-05 | End: 2021-06-05

## 2021-06-05 RX ORDER — METHYLPREDNISOLONE SODIUM SUCCINATE 125 MG/2ML
32 INJECTION, POWDER, LYOPHILIZED, FOR SOLUTION INTRAMUSCULAR; INTRAVENOUS ONCE
Status: DISCONTINUED | OUTPATIENT
Start: 2021-06-05 | End: 2021-06-05

## 2021-06-05 RX ORDER — METHYLPREDNISOLONE SODIUM SUCCINATE 40 MG/ML
32 INJECTION, POWDER, LYOPHILIZED, FOR SOLUTION INTRAMUSCULAR; INTRAVENOUS 2 TIMES DAILY
Status: COMPLETED | OUTPATIENT
Start: 2021-06-05 | End: 2021-06-07

## 2021-06-05 RX ORDER — METHYLPREDNISOLONE SODIUM SUCCINATE 40 MG/ML
32 INJECTION, POWDER, LYOPHILIZED, FOR SOLUTION INTRAMUSCULAR; INTRAVENOUS 2 TIMES DAILY
Status: DISCONTINUED | OUTPATIENT
Start: 2021-06-05 | End: 2021-06-05

## 2021-06-05 RX ADMIN — METHYLPREDNISOLONE SODIUM SUCCINATE 32 MG: 40 INJECTION, POWDER, FOR SOLUTION INTRAMUSCULAR; INTRAVENOUS at 14:37

## 2021-06-05 RX ADMIN — HYDROXYZINE HYDROCHLORIDE 25 MG: 25 TABLET, FILM COATED ORAL at 23:46

## 2021-06-05 RX ADMIN — HYDROCORTISONE 1 ENEMA: 100 ENEMA RECTAL at 20:21

## 2021-06-05 RX ADMIN — AMLODIPINE BESYLATE 5 MG: 5 TABLET ORAL at 09:05

## 2021-06-05 RX ADMIN — Medication 1 TABLET: at 09:06

## 2021-06-05 RX ADMIN — VENLAFAXINE HYDROCHLORIDE 225 MG: 225 TABLET, EXTENDED RELEASE ORAL at 09:06

## 2021-06-05 RX ADMIN — GABAPENTIN 900 MG: 300 CAPSULE ORAL at 09:06

## 2021-06-05 RX ADMIN — HYDROMORPHONE HYDROCHLORIDE 0.5 MG: 1 INJECTION, SOLUTION INTRAMUSCULAR; INTRAVENOUS; SUBCUTANEOUS at 16:23

## 2021-06-05 RX ADMIN — HYDROMORPHONE HYDROCHLORIDE 0.5 MG: 1 INJECTION, SOLUTION INTRAMUSCULAR; INTRAVENOUS; SUBCUTANEOUS at 09:02

## 2021-06-05 RX ADMIN — ENOXAPARIN SODIUM 40 MG: 40 INJECTION SUBCUTANEOUS at 14:37

## 2021-06-05 RX ADMIN — GABAPENTIN 900 MG: 300 CAPSULE ORAL at 16:20

## 2021-06-05 RX ADMIN — METHYLPREDNISOLONE SODIUM SUCCINATE 32 MG: 40 INJECTION, POWDER, FOR SOLUTION INTRAMUSCULAR; INTRAVENOUS at 20:22

## 2021-06-05 RX ADMIN — SULFAMETHOXAZOLE AND TRIMETHOPRIM 1 TABLET: 800; 160 TABLET ORAL at 09:06

## 2021-06-05 RX ADMIN — OXYCODONE HYDROCHLORIDE 10 MG: 5 TABLET ORAL at 14:36

## 2021-06-05 RX ADMIN — ROSUVASTATIN CALCIUM 10 MG: 10 TABLET, FILM COATED ORAL at 09:05

## 2021-06-05 RX ADMIN — MULTIPLE VITAMINS W/ MINERALS TAB 1 TABLET: TAB at 09:06

## 2021-06-05 RX ADMIN — HYDROXYZINE HYDROCHLORIDE 25 MG: 25 TABLET, FILM COATED ORAL at 20:35

## 2021-06-05 RX ADMIN — INSULIN ASPART 2 UNITS: 100 INJECTION, SOLUTION INTRAVENOUS; SUBCUTANEOUS at 18:48

## 2021-06-05 RX ADMIN — GABAPENTIN 900 MG: 300 CAPSULE ORAL at 20:22

## 2021-06-05 RX ADMIN — PANTOPRAZOLE SODIUM 40 MG: 40 TABLET, DELAYED RELEASE ORAL at 09:06

## 2021-06-05 RX ADMIN — INSULIN ASPART 3 UNITS: 100 INJECTION, SOLUTION INTRAVENOUS; SUBCUTANEOUS at 09:13

## 2021-06-05 RX ADMIN — Medication 5 MG: at 23:37

## 2021-06-05 RX ADMIN — Medication 1 TABLET: at 20:22

## 2021-06-05 RX ADMIN — HYDROCORTISONE 1 ENEMA: 100 ENEMA RECTAL at 09:30

## 2021-06-05 RX ADMIN — DICYCLOMINE HYDROCHLORIDE 20 MG: 20 TABLET ORAL at 09:06

## 2021-06-05 RX ADMIN — OXYCODONE HYDROCHLORIDE 10 MG: 5 TABLET ORAL at 23:39

## 2021-06-05 RX ADMIN — Medication 25 MCG: at 09:06

## 2021-06-05 RX ADMIN — INSULIN ASPART 1 UNITS: 100 INJECTION, SOLUTION INTRAVENOUS; SUBCUTANEOUS at 11:28

## 2021-06-05 RX ADMIN — HYDROXYZINE HYDROCHLORIDE 25 MG: 25 TABLET, FILM COATED ORAL at 11:16

## 2021-06-05 RX ADMIN — DICYCLOMINE HYDROCHLORIDE 20 MG: 20 TABLET ORAL at 14:36

## 2021-06-05 RX ADMIN — GABAPENTIN 900 MG: 300 CAPSULE ORAL at 14:37

## 2021-06-05 RX ADMIN — OXYCODONE HYDROCHLORIDE 10 MG: 5 TABLET ORAL at 04:18

## 2021-06-05 RX ADMIN — OXYCODONE HYDROCHLORIDE 10 MG: 5 TABLET ORAL at 09:02

## 2021-06-05 RX ADMIN — DICYCLOMINE HYDROCHLORIDE 20 MG: 20 TABLET ORAL at 17:35

## 2021-06-05 ASSESSMENT — ACTIVITIES OF DAILY LIVING (ADL)
ADLS_ACUITY_SCORE: 13
ADLS_ACUITY_SCORE: 13
ADLS_ACUITY_SCORE: 14
ADLS_ACUITY_SCORE: 13

## 2021-06-05 NOTE — PLAN OF CARE
Vitals: VSS on RA  Neuros: Alert and oriented x4. All extremties 5/5. Tingling to L side face to toes-MD notified. N/T to bilateral fingers.  IV: PIV SL'd  Resp/trach: Wnl on RA  Diet: Clear liquids, strict I/O.  Bowel status: Bs+x4. Tenderness to abdomen. No BM this shift. Denied nausea.  : Voiding.  Skin: Bruising to right forearm.  Pain: Utilizing prn oxycodone and dilaudid for pain to left abdomen with improvement in pain.  Activity: Up independently.  Plan: Report called to TEJINDER Weeks on 5A. Pt transferred to 5A in wheelchair via transport.

## 2021-06-05 NOTE — PROGRESS NOTES
Admission/Transfer from: Unit 6A   2 RN skin assessment completed. Yes  Significant findings include: Pt has diffuse mild bruising throughout extremities in varying stages. Nothing bigger than a quarter size bruise. No other findings.   WOC Nurse Consult Ordered? No  Was NST/NA able to join during assessment? No  Bed algorithm can be found in PCS flowsheets and forms binder, was this used for this assessment? Yes, standard bed remains at this time  Was a pulsate mattress ordered? No

## 2021-06-05 NOTE — CONSULTS
WOC Preoperative Ostomy    Diagnosis related to ostomy:  UC.   history of adjuvant nivolumab induced colitis in 2018 treated with remicade (x1 at [place]) and Entyvio, h/o c.diff (recent bout, started PO vanc 4/7), seronegative RA on prednisone & tocilizumab, steroid induced DM, history of cushings, chronic pain, mostly steroid dependent x3 years.      CRS recommending a total abdominal colectomy with end ileostomy, potential J pouch.     Date of Surgery: not scheduled   Emotional readiness for surgery: distraught  Physical Limitations: Without limitations  Teaching: Anatomy/picture of stoma, stoma/bowel function postop, intro to pouches, diet, returning to work/lifting, intimacy/sex, written/media offered and role of WOC/postop followup explained

## 2021-06-05 NOTE — PLAN OF CARE
Time:  Transfer from  at 5701-5185    Reason for admission:  UC flare  Activity:  IND  Pain:  C/o pain in L abdomen that radiated through left side up to left arm. Controlled with 10mg PO oxy  Neuro:  Aox4, pleasant and able to make needs known. Pt is int anxious given current medical status and life events, PRN atarax given x1 prior to sleep. Pt having int numbness to bilateral feet and constant numbness and tingling in hands. All other neuro appear intact.   Cardiac: WDL ex int tachy   Respiratory: WDL, stable on RA. No SOB or ADKINS reported.   GI/: No BM this shift, monitoring stool for % of blood per patient care order. Strict I&Os. Pt is aware and agreeable to call for placement of hat to measure output. Pt having intermittent N/V but denies this shift.   Diet: Clear liquid diet was tolerated, advanced to full liquid. Tolerance to this diet has not been assessed yet.   Lines:  R PIV, SL, WDL.   Skin:  See previous progress note for details. No gross issues noted   Labs/Imaging:  BG stable this shift, no other new labs/imaging. Stool sample sent this AM.     New changes this shift:   Continue prednisone taper as seen in MD note.     Plan: Pt interested in moving forward with colectomy, no scheduled date. Continue to monitor and provide pain control per plan of care.

## 2021-06-05 NOTE — PROGRESS NOTES
[facility]    Medicine Progress Note - Hospitalist Service, Terrie Young       Date of Admission:  6/3/2021  Assessment & Plan       Doretha Fernandez is a 47 yo F with history of metastatic malignant melanoma in complete remission, adjuvant immunotherapy induced severe colitis on Vedolizumab and prednisone taper (w/ possible plans for colectomy in the future), steroid induced DM2, and chronic pain syndrome who presented to the ED with complaints of rectal bleeding and abdominal pain thought to be secondary to colitis flare.      Plan:  Ulcerative Colitis Flare   Presented with abdominal pain and rectal bleeding likely related to ulcerative colitis flare. She has tried steroid tapers multiple times and two Entyvio infusions without relief.   - GI and colorectal surgery on board, appreciate recs   - IV methylprednisolone 32mg IV BID  - BID hydrocortisone enemas  - Monitor BM frequency and # of just blood  - PTA Bentyl 20mg PO TID   - PTA Bactrim 800/160mg PO daily for ppx while on steroids      Acute on chronic abdominal pain:   - Increased PTA Oxycodone to 5-10mg PO Q4H PRN   - Dilaudid 0.5mg Q2H PRN   - PTA Tylenol 1000mg PO Q8H PRN   - PTA Gabapentin 900mg PO QID      Nausea:   - PTA Zofran 8mg PO Q8H PRN      Steroid Induced DM2   - PTA insulin lispro 1-7 units QID with meals   - No PTA basal insulin   - Med dose sliding scale insulin   - Holding PTA Metformin in setting of possible imaging      Chronic Problems:   HTN - PTA Amlodipine 5mg PO daily   HLD - PTA Rosuvastatin 10mg PO daily   GERD   - hold PTA Famotidine 10mg PO BID   - PTA Pantoprazole 40mg PO every day    Iron Deficiency Anemia, resolved - PTA iron/vitamin C tablet 65-125mg PO every day   Anxiety - PTA Atarax 25mg PO TID    MDD - PTA Venlafaxine 225 mg PO daily    Vitamin D Deficiency - PTA Vitamin D 25 mcg PO daily      Diet: Snacks/Supplements Adult: Gelatein Plus; Between Meals  Advance Diet as Tolerated:  "Full Liquid Diet    DVT Prophylaxis: Enoxaparin (Lovenox) SQ  Major Catheter: not present  Code Status: Full Code      Disposition Plan   Expected discharge: 2 - 3 days, recommended to prior living arrangement once adequate pain management/ tolerating PO medications and long-term management plan established.  Entered: Krishna Wood MD 06/05/2021, 12:10 PM     The patient's care was discussed with the Bedside Nurse and Patient.    Krishna Wood MD  Hospitalist Service, [address]  Contact information available via [facility] Paging/Directory  Please see sign in/sign out for up to date coverage information  ______________________________________________________________________    Interval History   Reports pain is a little better this morning after IV hydromorphone. Has slightly decreased frequency of stools. 2 today so far, both with just \"blood and pus.\" Abdominal pain is 3/10 intensity, but was as bad as it's ever been yesterday when she was in the ED. Endorses nausea. No fever/chills    Data reviewed today: I reviewed all medications, new labs and imaging results over the last 24 hours.    Physical Exam   Vital Signs: Temp: 97.4  F (36.3  C) Temp src: Oral BP: 114/69 Pulse: 86   Resp: 12 SpO2: 94 % O2 Device: Nasal cannula Oxygen Delivery: 1 LPM  Weight: 247 lbs 0 oz  Constitutional: Lying in bed, tearful, no acute distress  GI: Normal bowel sounds, soft, severe tenderness on palpation of left lower quadrant without rebound tenderness or guarding  "

## 2021-06-05 NOTE — PROGRESS NOTES
"General Surgery Progress Note    Subjective: No acute events. Afebrile, vitals stable. Pain controlled, denies abd pain, bloating, n/v. Passing gas, no BM yesterday. Ambulating. Voiding.    Objective:   /69 (BP Location: Right arm)   Pulse 86   Temp 97.4  F (36.3  C) (Oral)   Resp 12   Ht 1.6 m (5' 3\")   Wt 112 kg (247 lb)   SpO2 94%   BMI 43.75 kg/m      PE:  Gen: comfortable in bed  CV: Regular rate  Resp: non-labored breathing on supplemental O2 o/n  Abd: Soft, non-tender, non-distended, no rebound/guarding  Ext: warm and well perfused      Intake/Output Summary (Last 24 hours) at 6/5/2021 0652  Last data filed at 6/4/2021 2100  Gross per 24 hour   Intake 360 ml   Output 450 ml   Net -90 ml       Labs pending.    A/P: 46F hx medically refractory UC with overlying C diff and possible immunotherapy colitis (h/o melanoma) who presented with hematochezia, nausea, abdominal and rectal pain. CRS was consulted to evaluate for TAC w/ EI. At the moment, she does not have concerning features or toxic megacolon or evidence of perforation.     - Continue cares per primary and GI  - Outpatient discussion in colorectal clinic about surgical management    Seen and discussed with staff on call.    Poncho Castellanos MD  Surgery PGY-1    Attestation:  I, Ernie Pagan MD, have reviewed and discussed with the advanced practice provider their history, physical and plan.  I personally reviewed the vital signs, medications and labs.  My key history or physical exam findings: Af. WBC NL. No evidence of toxic megacolon or perforation although continues to be refractory to medical therapy and is a surgical candidiate.  Key management decisions made by me: Doretha will be seeing Dr. Ram this Thursday to discuss surgical options and timing. Agree with [facility] consult for education. If she has decided to proceed with surgery, would recommend stopping biologics and taperiing steroids as much as possible.    Ernie" TRACY Pagan M.D., M.Sc.    Colon and Rectal Surgery  Pager: [phone].    June 5, 2021

## 2021-06-05 NOTE — PROGRESS NOTES
2530-8791: Afebrile. VSS on RA. O2 in low 90s. A/Ox4. Up independently. Pt anxious throughout the day, PRN atarax given x1. Admitted with abd pain + rectal bleeding r/t colitis flare. Pt c/o pain in left abdomen that radiates to left shoulder. PRN oxycodone given x2 and IV dilaudid given x1 with good relief. Tapering IV methylprednisolone, pt receiving 32mg BID. Numbness present in fingers and toes at baseline. Other neuros intact. Had 1 small, bloody BM with mucus present this afternoon. Voiding well. Tolerating full liquid diet with good appetite. Monitoring BGs before meal, sliding scale novolog given as ordered. Calls appropriately and makes needs known. Continue with POC.    Capri Hernandez RN

## 2021-06-06 LAB
ANION GAP SERPL CALCULATED.3IONS-SCNC: 6 MMOL/L (ref 3–14)
BUN SERPL-MCNC: 19 MG/DL (ref 7–30)
CALCIUM SERPL-MCNC: 8.8 MG/DL (ref 8.5–10.1)
CHLORIDE SERPL-SCNC: 102 MMOL/L (ref 94–109)
CO2 SERPL-SCNC: 29 MMOL/L (ref 20–32)
CREAT SERPL-MCNC: 0.8 MG/DL (ref 0.52–1.04)
CRP SERPL-MCNC: 25 MG/L (ref 0–8)
ERYTHROCYTE [DISTWIDTH] IN BLOOD BY AUTOMATED COUNT: 12.7 % (ref 10–15)
GFR SERPL CREATININE-BSD FRML MDRD: 88 ML/MIN/{1.73_M2}
GLUCOSE BLDC GLUCOMTR-MCNC: 178 MG/DL (ref 70–99)
GLUCOSE BLDC GLUCOMTR-MCNC: 189 MG/DL (ref 70–99)
GLUCOSE BLDC GLUCOMTR-MCNC: 283 MG/DL (ref 70–99)
GLUCOSE BLDC GLUCOMTR-MCNC: 310 MG/DL (ref 70–99)
GLUCOSE BLDC GLUCOMTR-MCNC: 323 MG/DL (ref 70–99)
GLUCOSE SERPL-MCNC: 181 MG/DL (ref 70–99)
HCT VFR BLD AUTO: 38.7 % (ref 35–47)
HGB BLD-MCNC: 12.4 G/DL (ref 11.7–15.7)
MCH RBC QN AUTO: 30.4 PG (ref 26.5–33)
MCHC RBC AUTO-ENTMCNC: 32 G/DL (ref 31.5–36.5)
MCV RBC AUTO: 95 FL (ref 78–100)
PLATELET # BLD AUTO: 249 10E9/L (ref 150–450)
POTASSIUM SERPL-SCNC: 4.1 MMOL/L (ref 3.4–5.3)
RBC # BLD AUTO: 4.08 10E12/L (ref 3.8–5.2)
SODIUM SERPL-SCNC: 138 MMOL/L (ref 133–144)
WBC # BLD AUTO: 8.7 10E9/L (ref 4–11)

## 2021-06-06 PROCEDURE — 86140 C-REACTIVE PROTEIN: CPT | Performed by: STUDENT IN AN ORGANIZED HEALTH CARE EDUCATION/TRAINING PROGRAM

## 2021-06-06 PROCEDURE — 85027 COMPLETE CBC AUTOMATED: CPT | Performed by: STUDENT IN AN ORGANIZED HEALTH CARE EDUCATION/TRAINING PROGRAM

## 2021-06-06 PROCEDURE — 250N000013 HC RX MED GY IP 250 OP 250 PS 637: Performed by: INTERNAL MEDICINE

## 2021-06-06 PROCEDURE — 80048 BASIC METABOLIC PNL TOTAL CA: CPT | Performed by: STUDENT IN AN ORGANIZED HEALTH CARE EDUCATION/TRAINING PROGRAM

## 2021-06-06 PROCEDURE — 250N000013 HC RX MED GY IP 250 OP 250 PS 637: Performed by: STUDENT IN AN ORGANIZED HEALTH CARE EDUCATION/TRAINING PROGRAM

## 2021-06-06 PROCEDURE — 99233 SBSQ HOSP IP/OBS HIGH 50: CPT | Mod: GC | Performed by: INTERNAL MEDICINE

## 2021-06-06 PROCEDURE — 36415 COLL VENOUS BLD VENIPUNCTURE: CPT | Performed by: STUDENT IN AN ORGANIZED HEALTH CARE EDUCATION/TRAINING PROGRAM

## 2021-06-06 PROCEDURE — 250N000011 HC RX IP 250 OP 636: Performed by: STUDENT IN AN ORGANIZED HEALTH CARE EDUCATION/TRAINING PROGRAM

## 2021-06-06 PROCEDURE — 999N000127 HC STATISTIC PERIPHERAL IV START W US GUIDANCE

## 2021-06-06 PROCEDURE — 250N000013 HC RX MED GY IP 250 OP 250 PS 637: Performed by: NURSE PRACTITIONER

## 2021-06-06 PROCEDURE — 999N001017 HC STATISTIC GLUCOSE BY METER IP

## 2021-06-06 PROCEDURE — 120N000002 HC R&B MED SURG/OB UMMC

## 2021-06-06 RX ADMIN — HYDROXYZINE HYDROCHLORIDE 25 MG: 25 TABLET, FILM COATED ORAL at 22:04

## 2021-06-06 RX ADMIN — VENLAFAXINE HYDROCHLORIDE 225 MG: 225 TABLET, EXTENDED RELEASE ORAL at 07:43

## 2021-06-06 RX ADMIN — HYDROXYZINE HYDROCHLORIDE 25 MG: 25 TABLET, FILM COATED ORAL at 11:51

## 2021-06-06 RX ADMIN — OXYCODONE HYDROCHLORIDE 10 MG: 5 TABLET ORAL at 14:36

## 2021-06-06 RX ADMIN — METHYLPREDNISOLONE SODIUM SUCCINATE 32 MG: 40 INJECTION, POWDER, FOR SOLUTION INTRAMUSCULAR; INTRAVENOUS at 17:16

## 2021-06-06 RX ADMIN — ENOXAPARIN SODIUM 40 MG: 40 INJECTION SUBCUTANEOUS at 14:33

## 2021-06-06 RX ADMIN — PANTOPRAZOLE SODIUM 40 MG: 40 TABLET, DELAYED RELEASE ORAL at 07:44

## 2021-06-06 RX ADMIN — ROSUVASTATIN CALCIUM 10 MG: 10 TABLET, FILM COATED ORAL at 07:44

## 2021-06-06 RX ADMIN — Medication 1 TABLET: at 20:49

## 2021-06-06 RX ADMIN — SULFAMETHOXAZOLE AND TRIMETHOPRIM 1 TABLET: 800; 160 TABLET ORAL at 07:44

## 2021-06-06 RX ADMIN — DICYCLOMINE HYDROCHLORIDE 20 MG: 20 TABLET ORAL at 17:16

## 2021-06-06 RX ADMIN — HYDROMORPHONE HYDROCHLORIDE 0.5 MG: 1 INJECTION, SOLUTION INTRAMUSCULAR; INTRAVENOUS; SUBCUTANEOUS at 08:08

## 2021-06-06 RX ADMIN — Medication 25 MCG: at 07:43

## 2021-06-06 RX ADMIN — GABAPENTIN 900 MG: 300 CAPSULE ORAL at 11:51

## 2021-06-06 RX ADMIN — OXYCODONE HYDROCHLORIDE 10 MG: 5 TABLET ORAL at 04:47

## 2021-06-06 RX ADMIN — HYDROMORPHONE HYDROCHLORIDE 0.5 MG: 1 INJECTION, SOLUTION INTRAMUSCULAR; INTRAVENOUS; SUBCUTANEOUS at 12:42

## 2021-06-06 RX ADMIN — METHYLPREDNISOLONE SODIUM SUCCINATE 32 MG: 40 INJECTION, POWDER, FOR SOLUTION INTRAMUSCULAR; INTRAVENOUS at 07:44

## 2021-06-06 RX ADMIN — OXYCODONE HYDROCHLORIDE 10 MG: 5 TABLET ORAL at 23:48

## 2021-06-06 RX ADMIN — HYDROMORPHONE HYDROCHLORIDE 0.5 MG: 1 INJECTION, SOLUTION INTRAMUSCULAR; INTRAVENOUS; SUBCUTANEOUS at 22:06

## 2021-06-06 RX ADMIN — GABAPENTIN 900 MG: 300 CAPSULE ORAL at 07:44

## 2021-06-06 RX ADMIN — INSULIN ASPART 1 UNITS: 100 INJECTION, SOLUTION INTRAVENOUS; SUBCUTANEOUS at 07:50

## 2021-06-06 RX ADMIN — DICYCLOMINE HYDROCHLORIDE 20 MG: 20 TABLET ORAL at 07:43

## 2021-06-06 RX ADMIN — OXYCODONE HYDROCHLORIDE 10 MG: 5 TABLET ORAL at 09:27

## 2021-06-06 RX ADMIN — HYDROCORTISONE 1 ENEMA: 100 ENEMA RECTAL at 09:28

## 2021-06-06 RX ADMIN — GABAPENTIN 900 MG: 300 CAPSULE ORAL at 17:16

## 2021-06-06 RX ADMIN — AMLODIPINE BESYLATE 5 MG: 5 TABLET ORAL at 07:43

## 2021-06-06 RX ADMIN — INSULIN ASPART 3 UNITS: 100 INJECTION, SOLUTION INTRAVENOUS; SUBCUTANEOUS at 17:25

## 2021-06-06 RX ADMIN — DICYCLOMINE HYDROCHLORIDE 20 MG: 20 TABLET ORAL at 11:51

## 2021-06-06 RX ADMIN — GABAPENTIN 900 MG: 300 CAPSULE ORAL at 20:49

## 2021-06-06 RX ADMIN — Medication 1 TABLET: at 07:44

## 2021-06-06 RX ADMIN — HYDROCORTISONE 1 ENEMA: 100 ENEMA RECTAL at 20:50

## 2021-06-06 RX ADMIN — INSULIN ASPART 4 UNITS: 100 INJECTION, SOLUTION INTRAVENOUS; SUBCUTANEOUS at 11:56

## 2021-06-06 RX ADMIN — MULTIPLE VITAMINS W/ MINERALS TAB 1 TABLET: TAB at 07:43

## 2021-06-06 ASSESSMENT — ACTIVITIES OF DAILY LIVING (ADL)
ADLS_ACUITY_SCORE: 13
ADLS_ACUITY_SCORE: 15
ADLS_ACUITY_SCORE: 13
ADLS_ACUITY_SCORE: 15

## 2021-06-06 NOTE — PROGRESS NOTES
[facility]    Medicine Progress Note - Hospitalist Service, Terrie 2       Date of Admission:  6/3/2021  Assessment & Plan       Doretha Fernandez is a 47 yo F with history of metastatic malignant melanoma in complete remission, adjuvant immunotherapy induced severe colitis on Vedolizumab and prednisone taper (w/ possible plans for colectomy in the future), steroid induced DM2, and chronic pain syndrome who presented to the ED with complaints of rectal bleeding and abdominal pain thought to be secondary to colitis flare.     Changes Today:   - Continue IV steroids   - Continue pain management with Dilaudid and Oxycodone      Plan:  Ulcerative Colitis Flare   Presented with abdominal pain and rectal bleeding likely related to ulcerative colitis flare. She has tried steroid tapers multiple times and two Entyvio infusions without relief.   - GI and colorectal surgery on board, appreciate recs   - IV methylprednisolone 32mg IV BID (end date: 6/7/21)   - BID hydrocortisone enemas  - Monitor BM frequency and # of just blood  - PTA Bentyl 20mg PO TID   - PTA Bactrim 800/160mg PO daily for ppx while on steroids      Acute on chronic abdominal pain:   - Increased PTA Oxycodone to 5-10mg PO Q4H PRN   - Dilaudid 0.5mg Q2H PRN   - PTA Tylenol 1000mg PO Q8H PRN   - PTA Gabapentin 900mg PO QID      Nausea:   - PTA Zofran 8mg PO Q8H PRN      Steroid Induced DM2   - PTA insulin lispro 1-7 units QID with meals   - No PTA basal insulin   - Med dose sliding scale insulin   - Holding PTA Metformin in setting of possible imaging      Chronic Problems:   HTN - PTA Amlodipine 5mg PO daily   HLD - PTA Rosuvastatin 10mg PO daily   GERD   - hold PTA Famotidine 10mg PO BID   - PTA Pantoprazole 40mg PO every day    Iron Deficiency Anemia, resolved - PTA iron/vitamin C tablet 65-125mg PO every day   Anxiety - PTA Atarax 25mg PO TID    MDD - PTA Venlafaxine 225 mg PO daily    Vitamin D Deficiency - PTA  Vitamin D 25 mcg PO daily      Diet: Snacks/Supplements Adult: Gelatein Plus; Between Meals  High Kcal/High Protein Diet, ADULT    DVT Prophylaxis: Enoxaparin (Lovenox) SQ  Major Catheter: not present  Code Status: Full Code      Disposition Plan   Expected discharge: 2 - 3 days, recommended to prior living arrangement once adequate pain management/ tolerating PO medications and long-term management plan established.  Entered: Abran Castano MD 06/06/2021, 10:52 AM     The patient's care was discussed with the Bedside Nurse and Patient.    Abran Castano MD  Hospitalist Service, [address]  Contact information available via [facility] Paging/Directory  Please see sign in/sign out for up to date coverage information  ______________________________________________________________________    Interval History   Intermittently distraught about situation. She is worried that surgery may not ultimately help her situation. Discussed waiting until Thursday to discuss with Dr. Ram. Patient states she will wait for further information. Did discuss that because there is no acute/emergent process occurring, surgery will not be occurring inpatient.     Data reviewed today: I reviewed all medications, new labs and imaging results over the last 24 hours.    Physical Exam   Vital Signs: Temp: 97.8  F (36.6  C) Temp src: Oral BP: 121/85 Pulse: 94   Resp: 18 SpO2: 94 % O2 Device: None (Room air)    Weight: 247 lbs 0 oz  Constitutional: Lying in bed, tearful, no acute distress  GI: Normal bowel sounds, soft, severe tenderness on palpation of left lower quadrant without rebound tenderness or guarding

## 2021-06-06 NOTE — PROGRESS NOTES
8678-1081: Afebrile. VSS on RA. O2 in low 90s. A/Ox4. Up independently. Pt intermittently tearful and anxious. PRN atarax given with relief. Admitted with abd pain + rectal bleeding r/t colitis flare. C/o pain across left abdomen that radiates to left arm. PRN oxycodone and dilaudid given with relief. Receiving IV methylprednisolone BID through 6/7 then will start oral prednisone taper. Patient's bowel movements are more brown today with less blood. Blood makes up about 25-50% of stool. Voiding well. Neuros are intact ex numbness/tingling in bilateral fingers and toes. High clint/protein diet, tolerating well. Blood sugars elevated today 310 and 283. Sliding scale novolog given per orders. Calls appropriately and makes needs known. Will discharge home once tolerating oral steroids and rectal bleeding has improved. Patient is meeting with colorectal surgeon as outpatient on 6/10 to discuss surgery options. Continue with POC.    Capri Hernandez RN

## 2021-06-06 NOTE — PLAN OF CARE
2300 - 0730:    VSS on RA. Pt utilizing 2L O2 via NC while sleeping (normally uses CPAP at home). Continuous pulse ox on. A&Ox4. Frequently tearful this shift. Independent. R PIV SL. Pt complaining of abdominal pain. PRN Oxycodone given x 2. High Kcal, High Protein diet. BG's ACHS & 0200.  @ 0200. Voiding WNL. 2 BM's this shift (see flowsheets).

## 2021-06-06 NOTE — PLAN OF CARE
A&O VSS on RA (1-2L O2 for sleep) PIV SL. Up to bathroom independently. BM x 3 (blood and mucus only). Voids without difficulty. IV dilaudid x 1 for abd pain. High clint diet, good appetite. Calls to make need known.

## 2021-06-07 DIAGNOSIS — G47.00 PERSISTENT INSOMNIA: ICD-10-CM

## 2021-06-07 LAB
ANION GAP SERPL CALCULATED.3IONS-SCNC: 5 MMOL/L (ref 3–14)
BUN SERPL-MCNC: 24 MG/DL (ref 7–30)
CALCIUM SERPL-MCNC: 8.7 MG/DL (ref 8.5–10.1)
CHLORIDE SERPL-SCNC: 103 MMOL/L (ref 94–109)
CO2 SERPL-SCNC: 30 MMOL/L (ref 20–32)
CREAT SERPL-MCNC: 0.81 MG/DL (ref 0.52–1.04)
CRP SERPL-MCNC: 12 MG/L (ref 0–8)
ERYTHROCYTE [DISTWIDTH] IN BLOOD BY AUTOMATED COUNT: 12.8 % (ref 10–15)
GFR SERPL CREATININE-BSD FRML MDRD: 88 ML/MIN/{1.73_M2}
GLUCOSE BLDC GLUCOMTR-MCNC: 162 MG/DL (ref 70–99)
GLUCOSE BLDC GLUCOMTR-MCNC: 199 MG/DL (ref 70–99)
GLUCOSE BLDC GLUCOMTR-MCNC: 206 MG/DL (ref 70–99)
GLUCOSE BLDC GLUCOMTR-MCNC: 265 MG/DL (ref 70–99)
GLUCOSE BLDC GLUCOMTR-MCNC: 284 MG/DL (ref 70–99)
GLUCOSE SERPL-MCNC: 161 MG/DL (ref 70–99)
HCT VFR BLD AUTO: 36.4 % (ref 35–47)
HGB BLD-MCNC: 11.7 G/DL (ref 11.7–15.7)
MCH RBC QN AUTO: 30.2 PG (ref 26.5–33)
MCHC RBC AUTO-ENTMCNC: 32.1 G/DL (ref 31.5–36.5)
MCV RBC AUTO: 94 FL (ref 78–100)
PLATELET # BLD AUTO: 249 10E9/L (ref 150–450)
POTASSIUM SERPL-SCNC: 4 MMOL/L (ref 3.4–5.3)
RBC # BLD AUTO: 3.88 10E12/L (ref 3.8–5.2)
SODIUM SERPL-SCNC: 138 MMOL/L (ref 133–144)
WBC # BLD AUTO: 13.5 10E9/L (ref 4–11)

## 2021-06-07 PROCEDURE — 120N000002 HC R&B MED SURG/OB UMMC

## 2021-06-07 PROCEDURE — 250N000013 HC RX MED GY IP 250 OP 250 PS 637: Performed by: STUDENT IN AN ORGANIZED HEALTH CARE EDUCATION/TRAINING PROGRAM

## 2021-06-07 PROCEDURE — 36415 COLL VENOUS BLD VENIPUNCTURE: CPT | Performed by: STUDENT IN AN ORGANIZED HEALTH CARE EDUCATION/TRAINING PROGRAM

## 2021-06-07 PROCEDURE — 85027 COMPLETE CBC AUTOMATED: CPT | Performed by: STUDENT IN AN ORGANIZED HEALTH CARE EDUCATION/TRAINING PROGRAM

## 2021-06-07 PROCEDURE — 250N000011 HC RX IP 250 OP 636: Performed by: STUDENT IN AN ORGANIZED HEALTH CARE EDUCATION/TRAINING PROGRAM

## 2021-06-07 PROCEDURE — 250N000013 HC RX MED GY IP 250 OP 250 PS 637: Performed by: NURSE PRACTITIONER

## 2021-06-07 PROCEDURE — 86140 C-REACTIVE PROTEIN: CPT | Performed by: STUDENT IN AN ORGANIZED HEALTH CARE EDUCATION/TRAINING PROGRAM

## 2021-06-07 PROCEDURE — 250N000011 HC RX IP 250 OP 636: Performed by: INTERNAL MEDICINE

## 2021-06-07 PROCEDURE — 999N001017 HC STATISTIC GLUCOSE BY METER IP

## 2021-06-07 PROCEDURE — 80048 BASIC METABOLIC PNL TOTAL CA: CPT | Performed by: STUDENT IN AN ORGANIZED HEALTH CARE EDUCATION/TRAINING PROGRAM

## 2021-06-07 PROCEDURE — 99233 SBSQ HOSP IP/OBS HIGH 50: CPT | Performed by: NURSE PRACTITIONER

## 2021-06-07 PROCEDURE — 250N000013 HC RX MED GY IP 250 OP 250 PS 637: Performed by: INTERNAL MEDICINE

## 2021-06-07 PROCEDURE — 99233 SBSQ HOSP IP/OBS HIGH 50: CPT | Performed by: INTERNAL MEDICINE

## 2021-06-07 RX ORDER — METHYLPREDNISOLONE SODIUM SUCCINATE 40 MG/ML
32 INJECTION, POWDER, LYOPHILIZED, FOR SOLUTION INTRAMUSCULAR; INTRAVENOUS ONCE
Status: COMPLETED | OUTPATIENT
Start: 2021-06-07 | End: 2021-06-07

## 2021-06-07 RX ADMIN — DICYCLOMINE HYDROCHLORIDE 20 MG: 20 TABLET ORAL at 18:19

## 2021-06-07 RX ADMIN — Medication 5 MG: at 21:38

## 2021-06-07 RX ADMIN — MULTIPLE VITAMINS W/ MINERALS TAB 1 TABLET: TAB at 08:37

## 2021-06-07 RX ADMIN — INSULIN ASPART 3 UNITS: 100 INJECTION, SOLUTION INTRAVENOUS; SUBCUTANEOUS at 13:02

## 2021-06-07 RX ADMIN — GABAPENTIN 900 MG: 300 CAPSULE ORAL at 13:03

## 2021-06-07 RX ADMIN — HYDROMORPHONE HYDROCHLORIDE 0.5 MG: 1 INJECTION, SOLUTION INTRAMUSCULAR; INTRAVENOUS; SUBCUTANEOUS at 10:32

## 2021-06-07 RX ADMIN — GABAPENTIN 900 MG: 300 CAPSULE ORAL at 16:26

## 2021-06-07 RX ADMIN — INSULIN ASPART 2 UNITS: 100 INJECTION, SOLUTION INTRAVENOUS; SUBCUTANEOUS at 18:19

## 2021-06-07 RX ADMIN — DICYCLOMINE HYDROCHLORIDE 20 MG: 20 TABLET ORAL at 08:37

## 2021-06-07 RX ADMIN — HYDROXYZINE HYDROCHLORIDE 25 MG: 25 TABLET, FILM COATED ORAL at 14:41

## 2021-06-07 RX ADMIN — METHYLPREDNISOLONE SODIUM SUCCINATE 32 MG: 40 INJECTION, POWDER, FOR SOLUTION INTRAMUSCULAR; INTRAVENOUS at 18:11

## 2021-06-07 RX ADMIN — Medication 1 TABLET: at 08:38

## 2021-06-07 RX ADMIN — METHYLPREDNISOLONE SODIUM SUCCINATE 32 MG: 40 INJECTION, POWDER, FOR SOLUTION INTRAMUSCULAR; INTRAVENOUS at 06:59

## 2021-06-07 RX ADMIN — VENLAFAXINE HYDROCHLORIDE 225 MG: 225 TABLET, EXTENDED RELEASE ORAL at 08:38

## 2021-06-07 RX ADMIN — GABAPENTIN 900 MG: 300 CAPSULE ORAL at 08:37

## 2021-06-07 RX ADMIN — HYDROXYZINE HYDROCHLORIDE 25 MG: 25 TABLET, FILM COATED ORAL at 21:38

## 2021-06-07 RX ADMIN — ROSUVASTATIN CALCIUM 10 MG: 10 TABLET, FILM COATED ORAL at 08:37

## 2021-06-07 RX ADMIN — AMLODIPINE BESYLATE 5 MG: 5 TABLET ORAL at 08:38

## 2021-06-07 RX ADMIN — DICYCLOMINE HYDROCHLORIDE 20 MG: 20 TABLET ORAL at 13:04

## 2021-06-07 RX ADMIN — OXYCODONE HYDROCHLORIDE 10 MG: 5 TABLET ORAL at 18:11

## 2021-06-07 RX ADMIN — INSULIN ASPART 1 UNITS: 100 INJECTION, SOLUTION INTRAVENOUS; SUBCUTANEOUS at 08:38

## 2021-06-07 RX ADMIN — GABAPENTIN 900 MG: 300 CAPSULE ORAL at 20:48

## 2021-06-07 RX ADMIN — Medication 25 MCG: at 08:37

## 2021-06-07 RX ADMIN — SULFAMETHOXAZOLE AND TRIMETHOPRIM 1 TABLET: 800; 160 TABLET ORAL at 08:38

## 2021-06-07 RX ADMIN — HYDROCORTISONE 1 ENEMA: 100 ENEMA RECTAL at 21:01

## 2021-06-07 RX ADMIN — ENOXAPARIN SODIUM 40 MG: 40 INJECTION SUBCUTANEOUS at 14:38

## 2021-06-07 RX ADMIN — HYDROMORPHONE HYDROCHLORIDE 0.5 MG: 1 INJECTION, SOLUTION INTRAMUSCULAR; INTRAVENOUS; SUBCUTANEOUS at 16:28

## 2021-06-07 RX ADMIN — HYDROMORPHONE HYDROCHLORIDE 0.5 MG: 1 INJECTION, SOLUTION INTRAMUSCULAR; INTRAVENOUS; SUBCUTANEOUS at 23:22

## 2021-06-07 RX ADMIN — OXYCODONE HYDROCHLORIDE 10 MG: 5 TABLET ORAL at 11:05

## 2021-06-07 RX ADMIN — PANTOPRAZOLE SODIUM 40 MG: 40 TABLET, DELAYED RELEASE ORAL at 08:38

## 2021-06-07 RX ADMIN — HYDROMORPHONE HYDROCHLORIDE 0.5 MG: 1 INJECTION, SOLUTION INTRAMUSCULAR; INTRAVENOUS; SUBCUTANEOUS at 20:49

## 2021-06-07 RX ADMIN — HYDROMORPHONE HYDROCHLORIDE 0.5 MG: 1 INJECTION, SOLUTION INTRAMUSCULAR; INTRAVENOUS; SUBCUTANEOUS at 13:14

## 2021-06-07 RX ADMIN — HYDROMORPHONE HYDROCHLORIDE 0.5 MG: 1 INJECTION, SOLUTION INTRAMUSCULAR; INTRAVENOUS; SUBCUTANEOUS at 18:19

## 2021-06-07 RX ADMIN — Medication 1 TABLET: at 20:48

## 2021-06-07 RX ADMIN — OXYCODONE HYDROCHLORIDE 10 MG: 5 TABLET ORAL at 14:41

## 2021-06-07 RX ADMIN — HYDROCORTISONE 1 ENEMA: 100 ENEMA RECTAL at 08:38

## 2021-06-07 ASSESSMENT — ACTIVITIES OF DAILY LIVING (ADL)
ADLS_ACUITY_SCORE: 14
ADLS_ACUITY_SCORE: 15
ADLS_ACUITY_SCORE: 14

## 2021-06-07 NOTE — PLAN OF CARE
VSS, up ind in room, reports bloody stools have decreased today, voiding, IV dilaudid prior to enema due to abd pain with this, BG  and sliding scale insulin given, pt is alert, orientated, pleasant and cooperative

## 2021-06-07 NOTE — PLAN OF CARE
Up independently.  Makes needs known.  Patient tearful, angry, anxious, and frustrated today.  Patient verbalized frustration with her continued health problems.  Also c/o increased pain today.  Using both prn dilaudid and oxycodone.  Patient also having some social problems with her teenage daughter outside of the hospital that is causing her additional stress.  Offered supportive listening, validation of feelings.  Discussed possible health psychology consult with primary service.  Continue to monitor.

## 2021-06-07 NOTE — PROGRESS NOTES
Care Management Follow Up    Length of Stay (days): 4    Expected Discharge Date: 06/08/21     Concerns to be Addressed:     Advance directive  Patient plan of care discussed at interdisciplinary rounds: No    Anticipated Discharge Disposition:  Home     Additional Information:  SW met with pt to discuss healthcare directive. Pt stated she had not received the healthcare directive form, but does want to complete one. SW provided blank form and reached out to [employer] to schedule remote notary for tomorrow at 10:30 via unit iPad. Patient to complete directive before that time.    No additional psychosocial concerns identified at this time, 5A  will follow going forward.    Diana Puri, [facility]  ICU  covering for 5A   [facility]   P: [phone]  Pager: [phone]

## 2021-06-07 NOTE — PLAN OF CARE
2300 - 0730:     VSS on RA. Pt utilizing 2L O2 via NC while sleeping (normally uses CPAP at home). Continuous pulse ox on. A&Ox4. Occasionally tearful this shift. Independent. R PIV SL. Pt complaining of abdominal pain. PRN Oxycodone given x 1. High Kcal, High Protein diet. BG's ACHS & 0200.  @ 0200. Voiding WNL. 1 BM's this shift (see flowsheets). Possible discharge today (6/7) and OP follow up for Colostomy.

## 2021-06-07 NOTE — PROGRESS NOTES
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     UC, proctosigmoid  Hematochezia  Abdominal pain  Minor leukocytosis noted today, wbc 8.5 >>> 13.5 with more abdominal pain, 3 bloody bm past 24 hr, consistent with past couple of day. Objectively CRP continues to drop. L sided disease/tenesmus likely driving her symptoms and not necessarily representing more inflammation. CRS planning to see as outpatient 6/10.     Recommendations  -Continue IV steroid this pm and hold tomorrow am, will reassess ability to transition to PO  -Continue BID Mg enemas  -Monitor bm: frequent, # just blood, #of stools (and if containing blood)    GI will continue to follow. Thank you for involving us in this patient's care. Please do not hesitate to contact the GI service with any questions or concerns.     Pt care plan discussed with Dr. Liu, GI staff physician.    Yamil Diaz CNP  [facility]  Text page          Interval History:   3 bloody bm past 24 hr with an additional 5 movements of only blood. Interval increase in abdominal pain this am. Denies fever, chills, nausea, vomiting. Tearful, describing frustration with lack of clear plan regarding next steps.          Medications:     Current Facility-Administered Medications   Medication     acetaminophen (TYLENOL) tablet 1,000 mg     amLODIPine (NORVASC) tablet 5 mg     calcium carbonate (TUMS) chewable tablet 500 mg     calcium carbonate 600 mg-vitamin D 400 units (CALTRATE) per tablet 1 tablet     glucose gel 15-30 g    Or     dextrose 50 % injection 25-50 mL    Or     glucagon injection 1 mg     dicyclomine  "(BENTYL) tablet 20 mg     enoxaparin ANTICOAGULANT (LOVENOX) injection 40 mg     ferrous fumarate 65 mg ([place]. FE)-Vitamin C 125 mg (VITRON C) tablet 1 tablet     gabapentin (NEURONTIN) capsule 900 mg     hydrocortisone (CORTENEMA) enema 1 enema     HYDROmorphone (PF) (DILAUDID) injection 0.5 mg     hydrOXYzine (ATARAX) tablet 25 mg     insulin aspart (NovoLOG) injection (RAPID ACTING)     insulin aspart (NovoLOG) injection (RAPID ACTING)     lidocaine (LMX4) cream     lidocaine 1 % 0.1-1 mL     melatonin tablet 5 mg     multivitamin w/minerals (THERA-VIT-M) tablet 1 tablet     naloxone (NARCAN) injection 0.2 mg    Or     naloxone (NARCAN) injection 0.4 mg    Or     naloxone (NARCAN) injection 0.2 mg    Or     naloxone (NARCAN) injection 0.4 mg     ondansetron (ZOFRAN) tablet 8 mg     oxyCODONE (ROXICODONE) tablet 5-10 mg     pantoprazole (PROTONIX) EC tablet 40 mg     rosuvastatin (CRESTOR) tablet 10 mg     sodium chloride (PF) 0.9% PF flush 3 mL     sodium chloride (PF) 0.9% PF flush 3 mL     sulfamethoxazole-trimethoprim (BACTRIM DS) 800-160 MG per tablet 1 tablet     venlafaxine (EFFEXOR-ER) 24 hr tablet 225 mg     Vitamin D3 (CHOLECALCIFEROL) tablet 25 mcg     Facility-Administered Medications Ordered in Other Encounters   Medication     lidocaine 1 % 9 mL     sodium bicarbonate 8.4 % injection 1 mEq        Physical Exam   Blood pressure 116/87, pulse 87, temperature 97.3  F (36.3  C), temperature source Oral, resp. rate 18, height 1.6 m (5' 3\"), weight 112 kg (247 lb), SpO2 97 %, not currently breastfeeding.  Constitutional: cooperative, pleasant, not dyspneic/diaphoretic, no acute distress  Eyes: Sclera anicteric/injected  Ears/nose/mouth/throat: Normal oropharynx without ulcers or exudate, mucus membranes moist  Neck: supple  CV: No edema  Respiratory: Unlabored breathing  Abd: Nondistended, +bs, no hepatosplenomegaly, tender generally, L > R, no peritoneal signs  Skin: warm, perfused, no jaundice  Neuro: " AAO x 3  Psych: Normal affect  MSK: No gross deformities    Data   Current Labs  CBC  Recent Labs   Lab 06/07/21  0636 06/06/21  0525 06/05/21  0848 06/04/21  0750   WBC 13.5* 8.7 10.3 9.0   RBC 3.88 4.08 4.14 3.99   HGB 11.7 12.4 12.7 12.3   HCT 36.4 38.7 38.5 38.4   MCV 94 95 93 96   MCH 30.2 30.4 30.7 30.8   MCHC 32.1 32.0 33.0 32.0   RDW 12.8 12.7 12.7 13.4    249 252 225     BMP  Recent Labs   Lab 06/07/21  0636 06/06/21  0525 06/05/21  0848 06/04/21  0750    138 134 136   POTASSIUM 4.0 4.1 4.1 4.1   CHLORIDE 103 102 99 104   CO2 30 29 27 28   ANIONGAP 5 6 7 5   * 181* 254* 113*   BUN 24 19 16 18   CR 0.81 0.80 0.79 0.72   GFRESTIMATED 88 88 >90 >90   GFRESTBLACK >90 >90 >90 >90   PATRICIA 8.7 8.8 9.1 8.3*      INRNo lab results found in last 7 days.  Liver panel  Recent Labs   Lab 06/04/21  0750 06/03/21  1224   PROTTOTAL 6.2* 6.7*   ALBUMIN 3.2* 3.5   BILITOTAL 0.3 0.2   ALKPHOS 54 53   AST 12 10   ALT 42 49       Imaging/procedures:  Reviewed in EMR

## 2021-06-07 NOTE — PROGRESS NOTES
[facility]    Medicine Progress Note - Hospitalist Service, Ter[facility] [address]       Date of Admission:  6/3/2021  Assessment & Plan       Doretha Fernandez is a 45 yo F with history of metastatic malignant melanoma in complete remission, adjuvant immunotherapy induced severe colitis on Vedolizumab and prednisone taper (w/ possible plans for colectomy in the future), steroid induced DM2, and chronic pain syndrome who presented to the ED with complaints of rectal bleeding and abdominal pain thought to be secondary to colitis flare.     Changes today: Health psychology consult, touch base with CRS and GI, additional dose of IV methylprednisolone tonight, reassess tomorrow morning.     Plan:  Ulcerative Colitis Flare   Presented with abdominal pain and rectal bleeding likely related to ulcerative colitis flare. She has tried steroid tapers multiple times and two Entyvio infusions without relief.   - GI and colorectal surgery on board, appreciate recs   -- CRS outpatient appointment on Thursday, 6/10  - IV methylprednisolone 32mg IV BID (end date: 6/7/21)   - BID hydrocortisone enemas  - Monitor BM frequency and # of just blood  - PTA Bentyl 20mg PO TID   - PTA Bactrim 800/160mg PO daily for ppx while on steroid    Anxiety, MDD: chronic, acutely exacerbated by illness:  - PTA Atarax 25mg PO TID, PTA Venlafaxine 225 mg PO daily    - Health psychology consult     Acute on chronic abdominal pain:   - Increased PTA Oxycodone to 5-10mg PO Q4H PRN   - Dilaudid 0.5mg Q2H PRN   - PTA Tylenol 1000mg PO Q8H PRN   - PTA Gabapentin 900mg PO QID     Prominent adnexal structures on CT: Will need pelvic US in future    Hematuria: 3RBC on UA, will need to repeat in future     Nausea:   - PTA Zofran 8mg PO Q8H PRN      Steroid Induced DM2   - PTA insulin lispro 1-7 units QID with meals   - No PTA basal insulin   - Med dose sliding scale insulin   - Holding PTA Metformin in setting of possible imaging       Chronic Problems:   HTN - PTA Amlodipine 5mg PO daily   HLD - PTA Rosuvastatin 10mg PO daily   GERD   - hold PTA Famotidine 10mg PO BID   - PTA Pantoprazole 40mg PO every day    Iron Deficiency Anemia, resolved - PTA iron/vitamin C tablet 65-125mg PO every day   Vitamin D Deficiency - PTA Vitamin D 25 mcg PO daily      Diet: Snacks/Supplements Adult: Gelatein Plus; Between Meals  High Kcal/High Protein Diet, ADULT    DVT Prophylaxis: Enoxaparin (Lovenox) SQ  Major Catheter: not present  Code Status: Full Code         Disposition Plan   Expected discharge: 2 - 3 days, recommended to prior living arrangement once adequate pain management/ tolerating PO medications.  Entered: Krishna Wood MD 06/07/2021, 12:58 PM     The patient's care was discussed with the Bedside Nurse and Patient.    Krishna Wood MD  Hospitalist Service, [address]  Contact information available via [facility] Paging/Directory  Please see sign in/sign out for up to date coverage information  ______________________________________________________________________    Interval History   Having more formed stools today, but they are more frequent and more painful. Start as blood followed by hard dark stools. More abdominal pain today. Says this is her usual loop and does not think things are getting better yet. Pain mostly on left side, radiating to left flank. No fever/chills.    Data reviewed today: I reviewed all medications, new labs and imaging results over the last 24 hours.    Physical Exam   Vital Signs: Temp: 97.3  F (36.3  C) Temp src: Oral BP: 116/87 Pulse: 87   Resp: 18 SpO2: 97 % O2 Device: None (Room air)    Weight: 247 lbs 0 oz  Constitutional: awake, alert, cooperative, no apparent distress, and appears stated age  GI: Normal bowel sounds, soft, moderately tender on palpation of left lower and upper quadrants, no rebound tenderness/guarding.  Back: Mild pain on  palpation of left flank

## 2021-06-07 NOTE — PROGRESS NOTES
COLON & RECTAL SURGERY  PROGRESS NOTE    SUBJECTIVE:  No acute events. Afebrile, vitals stable. Pain similar to severity to yesterday. BM x4 last 24 hours. Most recent BM more formed, less bloody, less painful. Tolerating diet.    OBJECTIVE:  Temp:  [96.3  F (35.7  C)-98.1  F (36.7  C)] 97.3  F (36.3  C)  Pulse:  [] 87  Resp:  [18-20] 18  BP: (116-139)/(75-87) 116/87  SpO2:  [94 %-97 %] 97 %    Intake/Output Summary (Last 24 hours) at 6/6/2021 0944  Last data filed at 6/5/2021 2000  Gross per 24 hour   Intake 240 ml   Output 1500 ml   Net -1260 ml       GENERAL:  No acute distress  EXTREMITIES: Warm and well perfused, no edema   ABDOMEN:  Soft, appropriately tender, non-distended. No guarding, rigidity, or peritoneal signs.     Labs reviewed.      ASSESSMENT/PLAN: 46F hx medically refractory UC with overlying C diff and possible immunotherapy colitis (h/o melanoma) who presented with hematochezia, nausea, abdominal and rectal pain. CRS was consulted to evaluate for TAC w/ EI. She continues to clinically improve as her bowel movements are becoming more formed and less bloody.      - Continue cares per primary and GI  - Outpatient discussion in colorectal clinic about surgical management (scheduled for 6/10 with Dr. Ram)  - If she decides to proceed with surgery, recommend stopping biologics and tapering steroids as much as possible.  - OK to discharge from colorectal perspective    Seen and discussed with fellow who will discuss with staff.    Poncho Castellanos MD  Surgery PGY-1

## 2021-06-08 ENCOUNTER — DOCUMENTATION ONLY (OUTPATIENT)
Dept: OTHER | Facility: CLINIC | Age: 46
End: 2021-06-08

## 2021-06-08 ENCOUNTER — AMBULATORY - HEALTHEAST (OUTPATIENT)
Dept: OTHER | Facility: CLINIC | Age: 46
End: 2021-06-08

## 2021-06-08 LAB
ANION GAP SERPL CALCULATED.3IONS-SCNC: 5 MMOL/L (ref 3–14)
BUN SERPL-MCNC: 25 MG/DL (ref 7–30)
CALCIUM SERPL-MCNC: 8.9 MG/DL (ref 8.5–10.1)
CHLORIDE SERPL-SCNC: 102 MMOL/L (ref 94–109)
CO2 SERPL-SCNC: 30 MMOL/L (ref 20–32)
CREAT SERPL-MCNC: 0.79 MG/DL (ref 0.52–1.04)
CRP SERPL-MCNC: 7.5 MG/L (ref 0–8)
ERYTHROCYTE [DISTWIDTH] IN BLOOD BY AUTOMATED COUNT: 13.1 % (ref 10–15)
GFR SERPL CREATININE-BSD FRML MDRD: >90 ML/MIN/{1.73_M2}
GLUCOSE BLDC GLUCOMTR-MCNC: 154 MG/DL (ref 70–99)
GLUCOSE BLDC GLUCOMTR-MCNC: 221 MG/DL (ref 70–99)
GLUCOSE BLDC GLUCOMTR-MCNC: 221 MG/DL (ref 70–99)
GLUCOSE BLDC GLUCOMTR-MCNC: 290 MG/DL (ref 70–99)
GLUCOSE BLDC GLUCOMTR-MCNC: 370 MG/DL (ref 70–99)
GLUCOSE SERPL-MCNC: 177 MG/DL (ref 70–99)
HCT VFR BLD AUTO: 36.9 % (ref 35–47)
HGB BLD-MCNC: 11.7 G/DL (ref 11.7–15.7)
MCH RBC QN AUTO: 30.1 PG (ref 26.5–33)
MCHC RBC AUTO-ENTMCNC: 31.7 G/DL (ref 31.5–36.5)
MCV RBC AUTO: 95 FL (ref 78–100)
PLATELET # BLD AUTO: 244 10E9/L (ref 150–450)
POTASSIUM SERPL-SCNC: 4.2 MMOL/L (ref 3.4–5.3)
RBC # BLD AUTO: 3.89 10E12/L (ref 3.8–5.2)
SODIUM SERPL-SCNC: 137 MMOL/L (ref 133–144)
WBC # BLD AUTO: 17.7 10E9/L (ref 4–11)

## 2021-06-08 PROCEDURE — 250N000013 HC RX MED GY IP 250 OP 250 PS 637: Performed by: NURSE PRACTITIONER

## 2021-06-08 PROCEDURE — 120N000002 HC R&B MED SURG/OB UMMC

## 2021-06-08 PROCEDURE — 86140 C-REACTIVE PROTEIN: CPT | Performed by: STUDENT IN AN ORGANIZED HEALTH CARE EDUCATION/TRAINING PROGRAM

## 2021-06-08 PROCEDURE — 250N000013 HC RX MED GY IP 250 OP 250 PS 637: Performed by: STUDENT IN AN ORGANIZED HEALTH CARE EDUCATION/TRAINING PROGRAM

## 2021-06-08 PROCEDURE — 250N000012 HC RX MED GY IP 250 OP 636 PS 637: Performed by: STUDENT IN AN ORGANIZED HEALTH CARE EDUCATION/TRAINING PROGRAM

## 2021-06-08 PROCEDURE — 99233 SBSQ HOSP IP/OBS HIGH 50: CPT | Mod: GC | Performed by: SURGERY

## 2021-06-08 PROCEDURE — 85027 COMPLETE CBC AUTOMATED: CPT | Performed by: STUDENT IN AN ORGANIZED HEALTH CARE EDUCATION/TRAINING PROGRAM

## 2021-06-08 PROCEDURE — 250N000013 HC RX MED GY IP 250 OP 250 PS 637: Performed by: INTERNAL MEDICINE

## 2021-06-08 PROCEDURE — 36415 COLL VENOUS BLD VENIPUNCTURE: CPT | Performed by: STUDENT IN AN ORGANIZED HEALTH CARE EDUCATION/TRAINING PROGRAM

## 2021-06-08 PROCEDURE — 250N000011 HC RX IP 250 OP 636: Performed by: STUDENT IN AN ORGANIZED HEALTH CARE EDUCATION/TRAINING PROGRAM

## 2021-06-08 PROCEDURE — 99233 SBSQ HOSP IP/OBS HIGH 50: CPT | Mod: GC | Performed by: STUDENT IN AN ORGANIZED HEALTH CARE EDUCATION/TRAINING PROGRAM

## 2021-06-08 PROCEDURE — 99233 SBSQ HOSP IP/OBS HIGH 50: CPT | Performed by: NURSE PRACTITIONER

## 2021-06-08 PROCEDURE — 80048 BASIC METABOLIC PNL TOTAL CA: CPT | Performed by: STUDENT IN AN ORGANIZED HEALTH CARE EDUCATION/TRAINING PROGRAM

## 2021-06-08 PROCEDURE — 999N001017 HC STATISTIC GLUCOSE BY METER IP

## 2021-06-08 RX ORDER — PREDNISONE 20 MG/1
40 TABLET ORAL DAILY
Status: DISCONTINUED | OUTPATIENT
Start: 2021-06-08 | End: 2021-06-09 | Stop reason: HOSPADM

## 2021-06-08 RX ORDER — HYDROMORPHONE HYDROCHLORIDE 2 MG/1
2 TABLET ORAL
Status: DISCONTINUED | OUTPATIENT
Start: 2021-06-08 | End: 2021-06-09 | Stop reason: HOSPADM

## 2021-06-08 RX ORDER — NICOTINE POLACRILEX 4 MG
15-30 LOZENGE BUCCAL
Status: DISCONTINUED | OUTPATIENT
Start: 2021-06-08 | End: 2021-06-09 | Stop reason: HOSPADM

## 2021-06-08 RX ORDER — DEXTROSE MONOHYDRATE 25 G/50ML
25-50 INJECTION, SOLUTION INTRAVENOUS
Status: DISCONTINUED | OUTPATIENT
Start: 2021-06-08 | End: 2021-06-09 | Stop reason: HOSPADM

## 2021-06-08 RX ORDER — SULFAMETHOXAZOLE AND TRIMETHOPRIM 400; 80 MG/1; MG/1
1 TABLET ORAL DAILY
Status: DISCONTINUED | OUTPATIENT
Start: 2021-06-09 | End: 2021-06-09 | Stop reason: HOSPADM

## 2021-06-08 RX ADMIN — PREDNISONE 40 MG: 20 TABLET ORAL at 15:49

## 2021-06-08 RX ADMIN — GABAPENTIN 900 MG: 300 CAPSULE ORAL at 20:28

## 2021-06-08 RX ADMIN — VENLAFAXINE HYDROCHLORIDE 225 MG: 225 TABLET, EXTENDED RELEASE ORAL at 09:58

## 2021-06-08 RX ADMIN — SULFAMETHOXAZOLE AND TRIMETHOPRIM 1 TABLET: 800; 160 TABLET ORAL at 09:57

## 2021-06-08 RX ADMIN — HYDROCORTISONE 1 ENEMA: 100 ENEMA RECTAL at 17:02

## 2021-06-08 RX ADMIN — OXYCODONE HYDROCHLORIDE 10 MG: 5 TABLET ORAL at 01:55

## 2021-06-08 RX ADMIN — ENOXAPARIN SODIUM 40 MG: 40 INJECTION SUBCUTANEOUS at 14:14

## 2021-06-08 RX ADMIN — OXYCODONE HYDROCHLORIDE 5 MG: 5 TABLET ORAL at 09:57

## 2021-06-08 RX ADMIN — OXYCODONE HYDROCHLORIDE 10 MG: 5 TABLET ORAL at 14:13

## 2021-06-08 RX ADMIN — GABAPENTIN 900 MG: 300 CAPSULE ORAL at 09:56

## 2021-06-08 RX ADMIN — HYDROMORPHONE HYDROCHLORIDE 2 MG: 2 TABLET ORAL at 15:48

## 2021-06-08 RX ADMIN — AMLODIPINE BESYLATE 5 MG: 5 TABLET ORAL at 09:57

## 2021-06-08 RX ADMIN — Medication 25 MCG: at 09:57

## 2021-06-08 RX ADMIN — Medication 1 TABLET: at 09:58

## 2021-06-08 RX ADMIN — HYDROMORPHONE HYDROCHLORIDE 2 MG: 2 TABLET ORAL at 20:28

## 2021-06-08 RX ADMIN — DICYCLOMINE HYDROCHLORIDE 20 MG: 20 TABLET ORAL at 18:20

## 2021-06-08 RX ADMIN — DICYCLOMINE HYDROCHLORIDE 20 MG: 20 TABLET ORAL at 09:57

## 2021-06-08 RX ADMIN — PANTOPRAZOLE SODIUM 40 MG: 40 TABLET, DELAYED RELEASE ORAL at 09:57

## 2021-06-08 RX ADMIN — ROSUVASTATIN CALCIUM 10 MG: 10 TABLET, FILM COATED ORAL at 10:53

## 2021-06-08 RX ADMIN — MULTIPLE VITAMINS W/ MINERALS TAB 1 TABLET: TAB at 09:57

## 2021-06-08 RX ADMIN — Medication 1 TABLET: at 20:28

## 2021-06-08 RX ADMIN — INSULIN ASPART 5 UNITS: 100 INJECTION, SOLUTION INTRAVENOUS; SUBCUTANEOUS at 14:47

## 2021-06-08 RX ADMIN — DICYCLOMINE HYDROCHLORIDE 20 MG: 20 TABLET ORAL at 14:13

## 2021-06-08 RX ADMIN — GABAPENTIN 900 MG: 300 CAPSULE ORAL at 14:13

## 2021-06-08 RX ADMIN — Medication 5 MG: at 22:48

## 2021-06-08 RX ADMIN — HYDROXYZINE HYDROCHLORIDE 25 MG: 25 TABLET, FILM COATED ORAL at 12:57

## 2021-06-08 RX ADMIN — HYDROXYZINE HYDROCHLORIDE 25 MG: 25 TABLET, FILM COATED ORAL at 22:48

## 2021-06-08 RX ADMIN — HYDROCORTISONE 1 ENEMA: 100 ENEMA RECTAL at 21:59

## 2021-06-08 RX ADMIN — GABAPENTIN 900 MG: 300 CAPSULE ORAL at 15:48

## 2021-06-08 ASSESSMENT — ACTIVITIES OF DAILY LIVING (ADL)
ADLS_ACUITY_SCORE: 13
ADLS_ACUITY_SCORE: 14
ADLS_ACUITY_SCORE: 13
DEPENDENT_IADLS:: INDEPENDENT

## 2021-06-08 NOTE — CONSULTS
Care Management Initial Consult        General Information  Assessment completed with: VM-chart review    Type of CM/SW Visit: evaluate for D/C Planning needs  Primary Care Provider verified and updated as needed: Yes   Readmission within the last 30 days: previous discharge plan unsuccessful   Return Category: Progression of disease    Reason for Consult: elevated risk for readmission, discharge planning  Advance Care Planning: no scanned documemnts ([facility] to notarize HCD today at 1030am appointment)          Communication Assessment  Patient's communication style:   spoken language (English or Bilingual)    Hearing Difficulty or Deaf: no   Wear Glasses or Blind: yes      Cognitive  Cognitive/Neuro/Behavioral: WDL  Level of Consciousness: alert  Arousal Level: opens eyes spontaneously  Orientation: oriented x 4  Mood/Behavior: angry, anxious, sad  Best Language: 0 - No aphasia  Speech: clear, logical, spontaneous        Living Environment:   People in home: children dependent, significant other  (Daughters ages 17 & 13, sig other Adolfo)  Current living Arrangements: house      Able to return to prior arrangements: yes       Family/Social Support:  Care provided by: self, significant other  Provides care for: children dependent (Daughters ages 17 & 13)  Marital Status:   Description of Support System: Supportive, Involved - Significant Other  (Adolfo)  Support Assessment: Complicated family dynamics, Caregiver experiencing high stress (pt's oldest daughter creating significant stress for her)      Current Resources:   Patient receiving home care services: No ([facility] closed services 5/25/21)  Community Resources:  Clinic SW rec's from 5/17/21: advised to call  - [facility]  ([phone]).   Equipment currently used at home: none  Supplies currently used at home: Diabetic Supplies      Employment/Financial:  Employment Status: employed    Employment Comments: working  on/off, FMLA, disability, etc 2/2 recurrent illness. Clinic SW f/u from 21: Pt reported she thinks her job is trying to get rid of her - already used FMLA last year, short 120 hours of time to get it back. Noted she was on long term disability last year. CC advised to call  - [facility]  ([phone]).       Lifestyle & Psychosocial Needs:  Socioeconomic History     Marital status:      Spouse name: Not on file     Number of children: Not on file     Years of education: Not on file     Highest education level: Not on file     Tobacco Use     Smoking status: Former Smoker     Packs/day: 1.00     Years: 5.00     Pack years: 5.00     Quit date: 3/20/1998     Years since quittin.2     Smokeless tobacco: Never Used   Substance and Sexual Activity     Alcohol use: Yes     Comment: occ     Drug use: No     Sexual activity: Not Currently     Partners: Male     Birth control/protection: Surgical       Functional Status:  Prior to admission patient needed assistance:   Dependent ADLs:: Independent  Dependent IADLs:: Independent     Mental Health Status:  Mental Health Status: No Current Concerns       Chemical Dependency Status:  Chemical Dependency Status: No Current Concerns           Values/Beliefs:  Spiritual, Cultural Beliefs, Moravian Practices, Values that affect care: no               Additional Information:    Date: 6/10/2021 Status: Scheduled     Time: 11:30 AM Length: 60     Visit Type: [facility] NEW [[id number]] Copay: $0.00     Provider: Quinton Ram MD Department: [facility] COLON & RECTAL SURGERY       Notes: discuss surgery for UC       Date: 2021 Status: Scheduled     Time: 3:40 PM Length: 40     Visit Type: VIDEO VISIT RETURN [1283] Copay: $0.00     Provider: Renard Davey MD Department: [facility] GASTROENTEROLOGY     Chloé,   RN [phone] Notes: follow up after with shonda whitaker        No needs identified from Care Management Assessment, anticipate  patient will dc today; please call or page if needs arise.  Janee Jj RN, BSN, PHN  Care Coordinator  [facility]  Direct phone: [phone]  Pager: [phone]    To contact the on-call Weekend Care Coordination Team please page [phone]

## 2021-06-08 NOTE — PROGRESS NOTES
[facility]    Medicine Progress Note - Hospitalist Service, Terrie 2       Date of Admission:  6/3/2021  Assessment & Plan         Doretha Fernandez is a 47 yo F with history of metastatic malignant melanoma in complete remission, adjuvant immunotherapy induced severe colitis on Vedolizumab and prednisone taper (w/ possible plans for colectomy in the future), steroid induced DM2, and chronic pain syndrome who presented to the ED with complaints of rectal bleeding and abdominal pain thought to be secondary to colitis flare.     Changes today:   - Discontinue IV Methylprednisolone   - Begin PO Prednisone   - Transition from IV dilaudid to PO   - Tentative discharge tomorrow AM if stable     Plan:    Ulcerative Colitis Flare   Presented with abdominal pain and rectal bleeding likely related to ulcerative colitis flare. She has tried steroid tapers multiple times and two Entyvio infusions without relief. Currently considering colectomy as a more permanent solution.   - GI and colorectal surgery on board, appreciate recs   - CRS outpatient appointment on Thursday, 6/10  - IV methylprednisolone 32mg IV BID (end date: 6/7/21)   - BID hydrocortisone enemas  - Monitor BM frequency and # of stools that are just blood  - PTA Bentyl 20mg PO TID   - PTA Bactrim 800/160mg PO daily for ppx while on steroid    - On Prednisone Taper as follows:               - 40mg PO daily for 14 days (6/8 - 6/21)               - 30 mg PO daily for 7 days (6/22 - 6/29)               - 20 mg PO daily for 7 days (6/30 - 7/7)               - 10 mg PO daily for 7 days (7/8 - 7/15)    - 5 mg PO daily for 7 days (7/16 - 7/23)    - 5 mg PO every other day for 7 days (7/25, 7/27, 7/29, 7/31)     Anxiety, MDD: chronic, acutely exacerbated by illness:  - PTA Atarax 25mg PO TID, PTA Venlafaxine 225 mg PO daily    - Health psychology consult, recs pending      Acute on chronic abdominal pain:   - Increased PTA  Oxycodone to 5-10mg PO Q4H PRN   - Dilaudid 0.5mg Q2H PRN discontinued   - PO Dilaudid 2mg Q2H PRN ordered   - PTA Tylenol 1000mg PO Q8H PRN   - PTA Gabapentin 900mg PO QID     Prominent adnexal structures on CT: Will need pelvic US in future    Hematuria: 3RBC on UA, will need to repeat in future     Nausea:   - PTA Zofran 8mg PO Q8H PRN      Steroid Induced DM2   - PTA insulin lispro 1-7 units QID with meals   - No PTA basal insulin   - Med dose sliding scale insulin   - Holding PTA Metformin in setting of possible imaging      Chronic Problems:   HTN - PTA Amlodipine 5mg PO daily   HLD - PTA Rosuvastatin 10mg PO daily   GERD   - hold PTA Famotidine 10mg PO BID   - PTA Pantoprazole 40mg PO every day    Iron Deficiency Anemia, resolved - PTA iron/vitamin C tablet 65-125mg PO every day   Vitamin D Deficiency - PTA Vitamin D 25 mcg PO daily      Diet: Snacks/Supplements Adult: Gelatein Plus; Between Meals  High Kcal/High Protein Diet, ADULT    DVT Prophylaxis: Enoxaparin (Lovenox) SQ  Major Catheter: not present  Code Status: Full Code         Disposition Plan   Expected discharge: Possible discharge tomorrow AM pending control of abdominal pain.      The patient's care was discussed with the Bedside Nurse and Patient, and attending physician, Dr. Ball.     Abran Castano MD  Hospitalist Service, [address]  Contact information available via [facility] Paging/Directory  Please see sign in/sign out for up to date coverage information  ______________________________________________________________________    Interval History   No acute events overnight. States that she feels emotional today because she is having additional stress at home. She is also unsure if surgery is the right decision. She is worried that after her colectomy, she may not be able to lose the weight required to move forward with a J pouch and may end up with a colectomy for some time.  Discussed with patient to wait until her appointment on Thursday to receive additional information to help with her decision.     Data reviewed today: I reviewed all medications, new labs and imaging results over the last 24 hours.    Physical Exam   Vital Signs: Temp: 97.8  F (36.6  C) Temp src: Oral BP: 121/73 Pulse: 79   Resp: 20 SpO2: 93 % O2 Device: Nasal cannula with humidification Oxygen Delivery: 2 LPM  Weight: 247 lbs 0 oz  Constitutional: awake, alert, cooperative, no apparent distress, and appears stated age. Intermittently tearful.   GI: Normal bowel sounds, soft, moderately tender on palpation of left lower and upper quadrants, no rebound tenderness/guarding.  Back: Mild pain on palpation of left flank

## 2021-06-08 NOTE — PLAN OF CARE
"Time: 7366-7522    Reason for admission: UC flare  Vitals: VSS on RA  Activity: Up independently in room  Pain: C/o sharp L lower abd pain, PRN oxy given x 2  Neuro: A&Ox4  Cardiac: No acute concerns  Respiratory: Denies SOB/cough. Wears CPAP at home, wearing 2 L NC overnight.   GI/: 2 loose brown/mucous BMs, scant amount of maroon blood. Voids without difficulty.   Diet: High kcal/protein  Lines:  R PIV SL  Skin/Wounds: No skin concerns  Labs/imaging:  and 370     New changes this shift: Pt tearful, anxious today. Several difficult conversations with providers about plan of care, potential future surgeries, etc. Pt states feeling \"overwhelmed and just not mentally here today.\" Emotional support provided. Psychology consult placed. Reassured autonomy in care plan and validated feelings. Lavender essential oils given. PRN atarax given once.  at lunch time, insulin pen unavailable at time and pt already started eating, so could not give 1 U insulin. At lunchtime, , 5 units given.     Plan: Continue prednisone taper. sliding scale insulin for steroid induced T2DM. Continue to monitor for bloody stools. GI following. Plan to f/u OP with GI to discuss possible colectomy.     "

## 2021-06-08 NOTE — PLAN OF CARE
"Time: 8103-2673     Reason for admission: Ulcerative colitis without complications, unspecified location (H) [K51.90]    Vitals: /85 (BP Location: Right arm)   Pulse 89   Temp 96.8  F (36  C) (Oral)   Resp 20   Ht 1.6 m (5' 3\")   Wt 112 kg (247 lb)   SpO2 93%   BMI 43.75 kg/m       Activity: IND   Pain: 7/10 pain in the left abdominal region that radiates upwards to left shoulder and neck. Oxy, dilaudid available for pain.   Neuro: AxO, pleasant.    Cardiac: WDL  Respiratory: RA during the day, Clear bilaterally. Wears 2L nc at night d/t not having cpap machine.   GI/: Voids spontaneously. 5+ bloody stools today.   Diet:  Orders Placed This Encounter      High Kcal/High Protein Diet, ADULT     Lines:   Peripheral IV 06/06/21 Right;Anterior Lower forearm (Active)   Site Assessment WDL 06/07/21 2100   Line Status Saline locked 06/07/21 2100   Dressing Intervention New dressing  06/07/21 2100   Phlebitis Scale 0-->no symptoms 06/07/21 2100   Infiltration Scale 0 06/07/21 2100   Infiltration Site Treatment Method  None 06/06/21 2345   Number of days: 1      Skin/Wounds: WDL. Clammnicolás.   BG:   Glucose Values Bedside Glucose (mg/dl )  GLUCOSE   Latest Ref Rng & Units - 70 - 99 mg/dL   6/7/2021 -- 265(H)   6/7/2021 -- 206(H)   6/7/2021 -- 284(H)   Some recent data might be hidden      Labs: Labs/Lactic]:    Hemoglobin (g/dL)   Date Value   06/07/2021 11.7     Creatinine (mg/dL)   Date Value   06/07/2021 0.81     Platelet Count (10e9/L)   Date Value   06/07/2021 249     Hematocrit (%)   Date Value   06/07/2021 36.4     WBC (10e9/L)   Date Value   06/07/2021 13.5 (H)       Lactic Acid (mmol/L)   Date Value   05/06/2021 2.9 (H)   05/06/2021 4.0 (HH)            [Electrolytes]:    Potassium (mmol/L)   Date Value   06/07/2021 4.0       Magnesium (mg/dL)   Date Value   05/06/2021 2.6 (H)       Phosphorus (mg/dL)   Date Value   05/06/2021 2.7       Sodium   Date Value Ref Range Status   06/07/2021 138 133 - 144 " mmol/L Final       New changes this shift: Patient frusturated with all of health issues going on with her. Therapeutic listening offered and appreciated. Having issues at home that is adding to her stress. Possibly discharging home tomorrow.

## 2021-06-08 NOTE — PROGRESS NOTES
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     UC, proctosigmoid  Hematochezia  Abdominal pain  Continued leukocytosis on IV steroids. CRP continues to improve 58 >>>> 7.5 since initiation IV steroids (6 doses), though she does report a small increase in bloody stools from 3 >> 5 past 24 hr. Will attempt to transition to PO steroids with plan for follow up in CRS clinic Thursday, pending tolerance. Pending further discussion between patient and CRS, will likely forgo further biologic therapy and attempt continued taper of steroids in preparation for TAC and end ileosotmy. Her next vedolizumab is scheduled for 6/24/21 at this time.     Recommendations  -Continue BID Mg enemas through today  -Continue daily CRP  -Monitor bm: frequent, # just blood, #of stools (and if containing blood)  -Tentative plan for discharge tomorrow with PO steroid taper beginning today:  -Recommend a gradual prednisone (PDN) taper pending tolerance:  - prednisone 40mg daily for 2 weeks, then  - prednisone 30/20/15/10/5mg daily for 1 week each, then  - prednisone 5mg every other day for 1 week until off.    Total pills: 228 (5 mg tabs)          GI will continue to follow. Thank you for involving us in this patient's care. Please do not hesitate to contact the GI service with any questions or concerns.     Pt care plan discussed with Dr. Liu, GI staff physician.    Yamil Diaz CNP  [facility]  Text page          Interval History:   Reports interval increase in stools containing bloody to 5 yesterday from 3 the previous day. No stools without blood. Pain remains unchanged.  "Denies fever, chills, nausea, and vomiting. Tolerating fair PO.         Medications:     Current Facility-Administered Medications   Medication     acetaminophen (TYLENOL) tablet 1,000 mg     amLODIPine (NORVASC) tablet 5 mg     calcium carbonate (TUMS) chewable tablet 500 mg     calcium carbonate 600 mg-vitamin D 400 units (CALTRATE) per tablet 1 tablet     glucose gel 15-30 g    Or     dextrose 50 % injection 25-50 mL    Or     glucagon injection 1 mg     dicyclomine (BENTYL) tablet 20 mg     enoxaparin ANTICOAGULANT (LOVENOX) injection 40 mg     ferrous fumarate 65 mg ([place]. FE)-Vitamin C 125 mg (VITRON C) tablet 1 tablet     gabapentin (NEURONTIN) capsule 900 mg     hydrocortisone (CORTENEMA) enema 1 enema     HYDROmorphone (PF) (DILAUDID) injection 0.5 mg     hydrOXYzine (ATARAX) tablet 25 mg     insulin aspart (NovoLOG) injection (RAPID ACTING)     insulin aspart (NovoLOG) injection (RAPID ACTING)     lidocaine (LMX4) cream     lidocaine 1 % 0.1-1 mL     melatonin tablet 5 mg     multivitamin w/minerals (THERA-VIT-M) tablet 1 tablet     naloxone (NARCAN) injection 0.2 mg    Or     naloxone (NARCAN) injection 0.4 mg    Or     naloxone (NARCAN) injection 0.2 mg    Or     naloxone (NARCAN) injection 0.4 mg     ondansetron (ZOFRAN) tablet 8 mg     oxyCODONE (ROXICODONE) tablet 5-10 mg     pantoprazole (PROTONIX) EC tablet 40 mg     rosuvastatin (CRESTOR) tablet 10 mg     sodium chloride (PF) 0.9% PF flush 3 mL     sodium chloride (PF) 0.9% PF flush 3 mL     sulfamethoxazole-trimethoprim (BACTRIM DS) 800-160 MG per tablet 1 tablet     venlafaxine (EFFEXOR-ER) 24 hr tablet 225 mg     Vitamin D3 (CHOLECALCIFEROL) tablet 25 mcg     Facility-Administered Medications Ordered in Other Encounters   Medication     lidocaine 1 % 9 mL     sodium bicarbonate 8.4 % injection 1 mEq        Physical Exam   Blood pressure 121/73, pulse 79, temperature 97.8  F (36.6  C), temperature source Oral, resp. rate 20, height 1.6 m (5' 3\"), " weight 112 kg (247 lb), SpO2 93 %, not currently breastfeeding.  Constitutional: cooperative, pleasant, not dyspneic/diaphoretic, no acute distress  Eyes: Sclera anicteric/injected  Ears/nose/mouth/throat: Normal oropharynx without ulcers or exudate, mucus membranes moist  Neck: supple  CV: No edema  Respiratory: Unlabored breathing  Abd: Nondistended, +bs, no hepatosplenomegaly, tender generally, L > R, no peritoneal signs  Skin: warm, perfused, no jaundice  Neuro: AAO x 3  Psych: Normal affect  MSK: No gross deformities    Data   Current Labs  CBC  Recent Labs   Lab 06/08/21  0623 06/07/21  0636 06/06/21  0525 06/05/21  0848   WBC 17.7* 13.5* 8.7 10.3   RBC 3.89 3.88 4.08 4.14   HGB 11.7 11.7 12.4 12.7   HCT 36.9 36.4 38.7 38.5   MCV 95 94 95 93   MCH 30.1 30.2 30.4 30.7   MCHC 31.7 32.1 32.0 33.0   RDW 13.1 12.8 12.7 12.7    249 249 252     BMP  Recent Labs   Lab 06/08/21  0623 06/07/21  0636 06/06/21  0525 06/05/21  0848    138 138 134   POTASSIUM 4.2 4.0 4.1 4.1   CHLORIDE 102 103 102 99   CO2 30 30 29 27   ANIONGAP 5 5 6 7   * 161* 181* 254*   BUN 25 24 19 16   CR 0.79 0.81 0.80 0.79   GFRESTIMATED >90 88 88 >90   GFRESTBLACK >90 >90 >90 >90   PATRICIA 8.9 8.7 8.8 9.1      INRNo lab results found in last 7 days.  Liver panel  Recent Labs   Lab 06/04/21  0750 06/03/21  1224   PROTTOTAL 6.2* 6.7*   ALBUMIN 3.2* 3.5   BILITOTAL 0.3 0.2   ALKPHOS 54 53   AST 12 10   ALT 42 49       Imaging/procedures:  Reviewed in EMR

## 2021-06-08 NOTE — PROGRESS NOTES
COLON & RECTAL SURGERY  PROGRESS NOTE    SUBJECTIVE:  No acute events. Afebrile, vitals stable. Still passing bloody stools and having abdominal pain that is worsened with bowel movements. After lengthy discussion yesterday afternoon, patient is in better spirits about potential surgery although still quite anxious.     OBJECTIVE:  Temp:  [96.7  F (35.9  C)-97.8  F (36.6  C)] 97.8  F (36.6  C)  Pulse:  [79-98] 79  Resp:  [18-20] 20  BP: (116-139)/(73-87) 121/73  SpO2:  [93 %-97 %] 93 %    Intake/Output Summary (Last 24 hours) at 6/6/2021 0944  Last data filed at 6/5/2021 2000  Gross per 24 hour   Intake 240 ml   Output 1500 ml   Net -1260 ml       GENERAL:  No acute distress  EXTREMITIES: Warm and well perfused, no edema   ABDOMEN:  Soft, appropriately tender, non-distended. No guarding, rigidity, or peritoneal signs.     Labs reviewed.  BMP wnl  CRP 7.5 (12.0)  WBC 17.7 (13.5); received dose of IV steroids yest evening, afebrile, HR 70s, clinically stable    ASSESSMENT/PLAN: 46F hx medically refractory UC with overlying C diff and possible immunotherapy colitis (h/o melanoma) who presented with hematochezia, nausea, abdominal and rectal pain. CRS was consulted to evaluate for TAC w/ EI. She continues to clinically improve as her bowel movements are becoming more formed and less bloody. She has a clinic appointment on 6/10 to discuss next steps.      - Continue cares per primary and GI  - Colorectal clinic appt for 6/10 with Dr. Ram  - If she decides to proceed with surgery, recommend stopping biologics and tapering steroids as much as possible.  - OK to discharge from colorectal perspective    Seen and discussed with fellow who will discuss with staff.    oPncho Castellanos MD  Surgery PGY-1

## 2021-06-09 VITALS
HEART RATE: 98 BPM | DIASTOLIC BLOOD PRESSURE: 72 MMHG | TEMPERATURE: 97.6 F | WEIGHT: 247 LBS | SYSTOLIC BLOOD PRESSURE: 120 MMHG | OXYGEN SATURATION: 96 % | RESPIRATION RATE: 18 BRPM | BODY MASS INDEX: 43.77 KG/M2 | HEIGHT: 63 IN

## 2021-06-09 LAB
ALBUMIN SERPL-MCNC: 3.1 G/DL (ref 3.4–5)
ANION GAP SERPL CALCULATED.3IONS-SCNC: 2 MMOL/L (ref 3–14)
BUN SERPL-MCNC: 23 MG/DL (ref 7–30)
CALCIUM SERPL-MCNC: 8.4 MG/DL (ref 8.5–10.1)
CHLORIDE SERPL-SCNC: 104 MMOL/L (ref 94–109)
CO2 SERPL-SCNC: 31 MMOL/L (ref 20–32)
CREAT SERPL-MCNC: 0.7 MG/DL (ref 0.52–1.04)
CRP SERPL-MCNC: 5.3 MG/L (ref 0–8)
ELASTASE PANC STL-MCNT: 2.9 UG/G
ERYTHROCYTE [DISTWIDTH] IN BLOOD BY AUTOMATED COUNT: 13.1 % (ref 10–15)
GFR SERPL CREATININE-BSD FRML MDRD: >90 ML/MIN/{1.73_M2}
GLUCOSE BLDC GLUCOMTR-MCNC: 141 MG/DL (ref 70–99)
GLUCOSE BLDC GLUCOMTR-MCNC: 222 MG/DL (ref 70–99)
GLUCOSE BLDC GLUCOMTR-MCNC: 227 MG/DL (ref 70–99)
GLUCOSE BLDC GLUCOMTR-MCNC: 259 MG/DL (ref 70–99)
GLUCOSE SERPL-MCNC: 153 MG/DL (ref 70–99)
HCT VFR BLD AUTO: 39.7 % (ref 35–47)
HGB BLD-MCNC: 12.7 G/DL (ref 11.7–15.7)
MCH RBC QN AUTO: 30.7 PG (ref 26.5–33)
MCHC RBC AUTO-ENTMCNC: 32 G/DL (ref 31.5–36.5)
MCV RBC AUTO: 96 FL (ref 78–100)
PLATELET # BLD AUTO: 254 10E9/L (ref 150–450)
POTASSIUM SERPL-SCNC: 3.9 MMOL/L (ref 3.4–5.3)
RBC # BLD AUTO: 4.14 10E12/L (ref 3.8–5.2)
SODIUM SERPL-SCNC: 138 MMOL/L (ref 133–144)
WBC # BLD AUTO: 16.1 10E9/L (ref 4–11)

## 2021-06-09 PROCEDURE — 250N000013 HC RX MED GY IP 250 OP 250 PS 637: Performed by: STUDENT IN AN ORGANIZED HEALTH CARE EDUCATION/TRAINING PROGRAM

## 2021-06-09 PROCEDURE — 250N000012 HC RX MED GY IP 250 OP 636 PS 637: Performed by: STUDENT IN AN ORGANIZED HEALTH CARE EDUCATION/TRAINING PROGRAM

## 2021-06-09 PROCEDURE — 90791 PSYCH DIAGNOSTIC EVALUATION: CPT | Performed by: PSYCHOLOGIST

## 2021-06-09 PROCEDURE — 99239 HOSP IP/OBS DSCHRG MGMT >30: CPT | Mod: GC | Performed by: STUDENT IN AN ORGANIZED HEALTH CARE EDUCATION/TRAINING PROGRAM

## 2021-06-09 PROCEDURE — 99233 SBSQ HOSP IP/OBS HIGH 50: CPT | Performed by: NURSE PRACTITIONER

## 2021-06-09 PROCEDURE — 36415 COLL VENOUS BLD VENIPUNCTURE: CPT | Performed by: STUDENT IN AN ORGANIZED HEALTH CARE EDUCATION/TRAINING PROGRAM

## 2021-06-09 PROCEDURE — 82040 ASSAY OF SERUM ALBUMIN: CPT | Performed by: STUDENT IN AN ORGANIZED HEALTH CARE EDUCATION/TRAINING PROGRAM

## 2021-06-09 PROCEDURE — 80048 BASIC METABOLIC PNL TOTAL CA: CPT | Performed by: STUDENT IN AN ORGANIZED HEALTH CARE EDUCATION/TRAINING PROGRAM

## 2021-06-09 PROCEDURE — 85027 COMPLETE CBC AUTOMATED: CPT | Performed by: STUDENT IN AN ORGANIZED HEALTH CARE EDUCATION/TRAINING PROGRAM

## 2021-06-09 PROCEDURE — 999N001017 HC STATISTIC GLUCOSE BY METER IP

## 2021-06-09 PROCEDURE — 250N000013 HC RX MED GY IP 250 OP 250 PS 637: Performed by: INTERNAL MEDICINE

## 2021-06-09 PROCEDURE — 86140 C-REACTIVE PROTEIN: CPT | Performed by: STUDENT IN AN ORGANIZED HEALTH CARE EDUCATION/TRAINING PROGRAM

## 2021-06-09 PROCEDURE — 250N000013 HC RX MED GY IP 250 OP 250 PS 637: Performed by: NURSE PRACTITIONER

## 2021-06-09 RX ORDER — AMLODIPINE BESYLATE 5 MG/1
5 TABLET ORAL DAILY
Qty: 30 TABLET | Refills: 0 | Status: SHIPPED | OUTPATIENT
Start: 2021-06-09 | End: 2021-07-15

## 2021-06-09 RX ORDER — PREDNISONE 20 MG/1
30 TABLET ORAL DAILY
Qty: 21 TABLET | Refills: 0 | Status: ON HOLD | OUTPATIENT
Start: 2021-06-09 | End: 2021-06-18

## 2021-06-09 RX ORDER — HYDROMORPHONE HYDROCHLORIDE 2 MG/1
2 TABLET ORAL
Qty: 30 TABLET | Refills: 0 | Status: ON HOLD | OUTPATIENT
Start: 2021-06-09 | End: 2021-06-18

## 2021-06-09 RX ORDER — PREDNISONE 5 MG/1
5 TABLET ORAL DAILY
Qty: 14 TABLET | Refills: 0 | Status: ON HOLD | OUTPATIENT
Start: 2021-06-09 | End: 2021-06-18

## 2021-06-09 RX ORDER — SULFAMETHOXAZOLE AND TRIMETHOPRIM 400; 80 MG/1; MG/1
1 TABLET ORAL DAILY
Qty: 120 TABLET | Refills: 0 | Status: SHIPPED | OUTPATIENT
Start: 2021-06-10 | End: 2021-07-28

## 2021-06-09 RX ORDER — HYDROXYZINE HYDROCHLORIDE 25 MG/1
25 TABLET, FILM COATED ORAL AT BEDTIME
Qty: 90 TABLET | Refills: 1 | Status: SHIPPED | OUTPATIENT
Start: 2021-06-09 | End: 2021-06-09

## 2021-06-09 RX ORDER — PANTOPRAZOLE SODIUM 40 MG/1
40 TABLET, DELAYED RELEASE ORAL
Qty: 30 TABLET | Refills: 0 | Status: SHIPPED | OUTPATIENT
Start: 2021-06-09 | End: 2021-07-15

## 2021-06-09 RX ORDER — PREDNISONE 5 MG/1
5 TABLET ORAL DAILY
Qty: 7 TABLET | Refills: 0 | Status: ON HOLD | OUTPATIENT
Start: 2021-06-09 | End: 2021-06-18

## 2021-06-09 RX ORDER — PREDNISONE 10 MG/1
10 TABLET ORAL DAILY
Qty: 14 TABLET | Refills: 0 | Status: ON HOLD | OUTPATIENT
Start: 2021-06-09 | End: 2021-06-18

## 2021-06-09 RX ORDER — PREDNISONE 20 MG/1
40 TABLET ORAL DAILY
Qty: 26 TABLET | Refills: 0 | Status: ON HOLD | OUTPATIENT
Start: 2021-06-10 | End: 2021-06-18

## 2021-06-09 RX ORDER — HYDROXYZINE HYDROCHLORIDE 25 MG/1
25 TABLET, FILM COATED ORAL AT BEDTIME
Qty: 30 TABLET | Refills: 1 | Status: ON HOLD | OUTPATIENT
Start: 2021-06-09 | End: 2021-06-18

## 2021-06-09 RX ORDER — OXYCODONE HYDROCHLORIDE 5 MG/1
5 TABLET ORAL EVERY 4 HOURS PRN
Qty: 30 TABLET | Refills: 0 | Status: ON HOLD | OUTPATIENT
Start: 2021-06-09 | End: 2021-06-18

## 2021-06-09 RX ORDER — ACETAMINOPHEN 325 MG/1
650 TABLET ORAL EVERY 6 HOURS PRN
Status: DISCONTINUED | OUTPATIENT
Start: 2021-06-09 | End: 2021-06-09 | Stop reason: HOSPADM

## 2021-06-09 RX ORDER — PREDNISONE 20 MG/1
20 TABLET ORAL DAILY
Qty: 14 TABLET | Refills: 0 | Status: ON HOLD | OUTPATIENT
Start: 2021-06-09 | End: 2021-06-18

## 2021-06-09 RX ADMIN — ROSUVASTATIN CALCIUM 10 MG: 10 TABLET, FILM COATED ORAL at 08:00

## 2021-06-09 RX ADMIN — DICYCLOMINE HYDROCHLORIDE 20 MG: 20 TABLET ORAL at 08:00

## 2021-06-09 RX ADMIN — GABAPENTIN 900 MG: 300 CAPSULE ORAL at 07:58

## 2021-06-09 RX ADMIN — ACETAMINOPHEN 650 MG: 325 TABLET, FILM COATED ORAL at 06:42

## 2021-06-09 RX ADMIN — OXYCODONE HYDROCHLORIDE 10 MG: 5 TABLET ORAL at 11:02

## 2021-06-09 RX ADMIN — Medication 25 MCG: at 07:59

## 2021-06-09 RX ADMIN — DICYCLOMINE HYDROCHLORIDE 20 MG: 20 TABLET ORAL at 12:55

## 2021-06-09 RX ADMIN — PREDNISONE 40 MG: 20 TABLET ORAL at 08:00

## 2021-06-09 RX ADMIN — ACETAMINOPHEN 1000 MG: 500 TABLET, FILM COATED ORAL at 00:37

## 2021-06-09 RX ADMIN — VENLAFAXINE HYDROCHLORIDE 225 MG: 225 TABLET, EXTENDED RELEASE ORAL at 07:58

## 2021-06-09 RX ADMIN — AMLODIPINE BESYLATE 5 MG: 5 TABLET ORAL at 07:59

## 2021-06-09 RX ADMIN — Medication 1 TABLET: at 08:00

## 2021-06-09 RX ADMIN — HYDROXYZINE HYDROCHLORIDE 25 MG: 25 TABLET, FILM COATED ORAL at 09:39

## 2021-06-09 RX ADMIN — HYDROMORPHONE HYDROCHLORIDE 2 MG: 2 TABLET ORAL at 16:46

## 2021-06-09 RX ADMIN — GABAPENTIN 900 MG: 300 CAPSULE ORAL at 12:55

## 2021-06-09 RX ADMIN — MULTIPLE VITAMINS W/ MINERALS TAB 1 TABLET: TAB at 08:00

## 2021-06-09 RX ADMIN — PANTOPRAZOLE SODIUM 40 MG: 40 TABLET, DELAYED RELEASE ORAL at 08:01

## 2021-06-09 RX ADMIN — GABAPENTIN 900 MG: 300 CAPSULE ORAL at 15:52

## 2021-06-09 RX ADMIN — INSULIN HUMAN 6 UNITS: 100 INJECTION, SUSPENSION SUBCUTANEOUS at 09:39

## 2021-06-09 RX ADMIN — HYDROMORPHONE HYDROCHLORIDE 2 MG: 2 TABLET ORAL at 13:00

## 2021-06-09 RX ADMIN — HYDROCORTISONE 1 ENEMA: 100 ENEMA RECTAL at 08:17

## 2021-06-09 RX ADMIN — SULFAMETHOXAZOLE AND TRIMETHOPRIM 1 TABLET: 400; 80 TABLET ORAL at 07:58

## 2021-06-09 RX ADMIN — OXYCODONE HYDROCHLORIDE 10 MG: 5 TABLET ORAL at 06:42

## 2021-06-09 RX ADMIN — HYDROMORPHONE HYDROCHLORIDE 2 MG: 2 TABLET ORAL at 00:36

## 2021-06-09 RX ADMIN — HYDROMORPHONE HYDROCHLORIDE 2 MG: 2 TABLET ORAL at 08:11

## 2021-06-09 RX ADMIN — OXYCODONE HYDROCHLORIDE 10 MG: 5 TABLET ORAL at 14:54

## 2021-06-09 ASSESSMENT — ACTIVITIES OF DAILY LIVING (ADL)
ADLS_ACUITY_SCORE: 13

## 2021-06-09 NOTE — TELEPHONE ENCOUNTER
"Requested Prescriptions   Pending Prescriptions Disp Refills     hydrOXYzine (ATARAX) 25 MG tablet [Pharmacy Med Name: hydrOXYzine HCL 25MG TAB] 90 tablet 1     Sig: TAKE ONE TABLET BY MOUTH AT BEDTIME       Antihistamines Protocol Passed - 6/7/2021  3:23 PM        Passed - Recent (12 mo) or future (30 days) visit within the authorizing provider's specialty     Patient has had an office visit with the authorizing provider or a provider within the authorizing providers department within the previous 12 mos or has a future within next 30 days. See \"Patient Info\" tab in inbasket, or \"Choose Columns\" in Meds & Orders section of the refill encounter.              Passed - Patient is age 3 or older     Apply age and/or weight-based dosing for peds patients age 3 and older.    Forward request to provider for patients under the age of 3.          Passed - Medication is active on med list             "

## 2021-06-09 NOTE — PLAN OF CARE
"Time: 0700 - 1500    Reason for admission: UC flare  Vitals: VSS on RA  Activity: Up independently in room  Pain: C/o sharp L and R lower abd pain, PRN oxy and PO dilaudid given   Neuro: A&Ox4  Cardiac: No acute concerns  Respiratory: Denies SOB/cough. Wears CPAP at home, wearing 2 L NC overnight.   GI/: One loose brown/mucous BM, scant amount of brown/maroon blood. Voids without difficulty. Hydrocortisone enema given.    Diet: High kcal/protein, carb counting  Skin/Wounds: No skin concerns  Labs/imaging:  and 227, covered with SSI      New changes this shift: Pt continues to appear anxious, tearful. Pt states feeling overwhelmed with current health state and says she is scared of dying. Pt states she doesn't want to stay in the hospital because she doesn't want to \"catch anything here\" and add to her list of problems, saying she feels like everything around her is contaminated. Pt showered, linens changed, felt some relief from this. Pt states she doesn't want to go home either, feeling she is a burden to her family and fears she will have no support at home. Psychologist called patient's room phone to consult. PRN atarax given. Reassured autonomy in care plan and validated feelings. Lavender essential oils given.     Plan: Continue prednisone taper. sliding scale insulin for steroid induced T2DM. Continue to monitor for bloody stools. GI following. Plan to f/u OP with GI tomorrow (6/10) to discuss colectomy. AVS reviewed with patient, belongings returned from security and in room, PIV removed. Pt will discharge home when discharge medications ready.    "

## 2021-06-09 NOTE — PROGRESS NOTES
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     UC, proctosigmoid  Hematochezia  Abdominal pain  Continued leukocytosis on IV steroids. CRP continues to improve 58 >>>> 5.3 following transition to PO steroids yesterday after 6 doses IV steroid, with interval decrease in bloody stool 3 >> 2 past 24 hr. CRS visit outpatient to further discuss TAC and EI. Ideally will continue to taper steroid and will hold 6/24 vedolizumab presuming decision to go forward with surgery in the coming weeks is made. Will also ensure she is seen in outpatient IBD clinic, timing pending disease/surgical course     Recommendations  -Continue BID Mg enemas if patient desires/finds helpful  -CRS clinic visit 6/9 to further discuss TAC and end ileostomy  -Will make primary IBD provider Dr. Boudreaux aware of situation to help arrange follow up in outpatient setting  -Continue PO steroid taper:  -Recommend a gradual prednisone (PDN) taper pending tolerance:  - prednisone 40mg daily for 2 weeks total, then  - prednisone 30/20/15/10/5mg daily for 1 week each, then  - prednisone 5mg every other day for 1 week until off.    Total pills: 228 (5 mg tabs)  -With severe increase in abdominal pain, nausea/vomiting or bloody stools with dizziness, lightheadedness or chest pain immediately seek care      GI will signoff. Thank you for involving us in this patient's care. Please do not hesitate to contact the GI service with any questions or concerns.     Pt care plan discussed with Dr. Liu, GI staff physician.    Yamil Diaz CNP  [facility]  Text page          Interval History:    Continues with tenesmus and frequent small volume bloody output. Estimates only two bm, both with blood, past 24 hr. Pain remains about the same on PO opioids. Denies fever, chills, vomiting.          Medications:     Current Facility-Administered Medications   Medication     acetaminophen (TYLENOL) tablet 650 mg     amLODIPine (NORVASC) tablet 5 mg     calcium carbonate (TUMS) chewable tablet 500 mg     calcium carbonate 600 mg-vitamin D 400 units (CALTRATE) per tablet 1 tablet     glucose gel 15-30 g    Or     dextrose 50 % injection 25-50 mL    Or     glucagon injection 1 mg     glucose gel 15-30 g    Or     dextrose 50 % injection 25-50 mL    Or     glucagon injection 1 mg     dicyclomine (BENTYL) tablet 20 mg     enoxaparin ANTICOAGULANT (LOVENOX) injection 40 mg     ferrous fumarate 65 mg ([place]. FE)-Vitamin C 125 mg (VITRON C) tablet 1 tablet     gabapentin (NEURONTIN) capsule 900 mg     hydrocortisone (CORTENEMA) enema 1 enema     HYDROmorphone (DILAUDID) tablet 2 mg     hydrOXYzine (ATARAX) tablet 25 mg     insulin aspart (NovoLOG) injection (RAPID ACTING)     insulin aspart (NovoLOG) injection (RAPID ACTING)     insulin NPH injection 6 Units     lidocaine (LMX4) cream     lidocaine 1 % 0.1-1 mL     melatonin tablet 5 mg     multivitamin w/minerals (THERA-VIT-M) tablet 1 tablet     naloxone (NARCAN) injection 0.2 mg    Or     naloxone (NARCAN) injection 0.4 mg    Or     naloxone (NARCAN) injection 0.2 mg    Or     naloxone (NARCAN) injection 0.4 mg     ondansetron (ZOFRAN) tablet 8 mg     oxyCODONE (ROXICODONE) tablet 5-10 mg     pantoprazole (PROTONIX) EC tablet 40 mg     predniSONE (DELTASONE) tablet 40 mg     rosuvastatin (CRESTOR) tablet 10 mg     sodium chloride (PF) 0.9% PF flush 3 mL     sodium chloride (PF) 0.9% PF flush 3 mL     sulfamethoxazole-trimethoprim (BACTRIM) 400-80 MG per tablet 1 tablet     venlafaxine (EFFEXOR-ER) 24 hr tablet 225 mg     Vitamin D3 (CHOLECALCIFEROL) tablet 25 mcg  "    Facility-Administered Medications Ordered in Other Encounters   Medication     lidocaine 1 % 9 mL     sodium bicarbonate 8.4 % injection 1 mEq        Physical Exam   Blood pressure 122/72, pulse 68, temperature 97.7  F (36.5  C), temperature source Oral, resp. rate 18, height 1.6 m (5' 3\"), weight 112 kg (247 lb), SpO2 94 %, not currently breastfeeding.  Constitutional: cooperative, pleasant, not dyspneic/diaphoretic, no acute distress  Eyes: Sclera anicteric/injected  Ears/nose/mouth/throat: Normal oropharynx without ulcers or exudate, mucus membranes moist  Neck: supple  CV: No edema  Respiratory: Unlabored breathing  Abd: Nondistended, +bs, no hepatosplenomegaly, tender generally, L > R, no peritoneal signs  Skin: warm, perfused, no jaundice  Neuro: AAO x 3  Psych: Normal affect  MSK: No gross deformities    Data   Current Labs  CBC  Recent Labs   Lab 06/09/21 0626 06/08/21 0623 06/07/21  0636 06/06/21  0525   WBC 16.1* 17.7* 13.5* 8.7   RBC 4.14 3.89 3.88 4.08   HGB 12.7 11.7 11.7 12.4   HCT 39.7 36.9 36.4 38.7   MCV 96 95 94 95   MCH 30.7 30.1 30.2 30.4   MCHC 32.0 31.7 32.1 32.0   RDW 13.1 13.1 12.8 12.7    244 249 249     BMP  Recent Labs   Lab 06/09/21 0626 06/08/21 0623 06/07/21  0636 06/06/21  0525    137 138 138   POTASSIUM 3.9 4.2 4.0 4.1   CHLORIDE 104 102 103 102   CO2 31 30 30 29   ANIONGAP 2* 5 5 6   * 177* 161* 181*   BUN 23 25 24 19   CR 0.70 0.79 0.81 0.80   GFRESTIMATED >90 >90 88 88   GFRESTBLACK >90 >90 >90 >90   PATRICIA 8.4* 8.9 8.7 8.8      INRNo lab results found in last 7 days.  Liver panel  Recent Labs   Lab 06/04/21  0750 06/03/21  1224   PROTTOTAL 6.2* 6.7*   ALBUMIN 3.2* 3.5   BILITOTAL 0.3 0.2   ALKPHOS 54 53   AST 12 10   ALT 42 49       Imaging/procedures:  Reviewed in EMR    "

## 2021-06-09 NOTE — PLAN OF CARE
"[id number]: pt remains A&O and vitally stable on 2L NC overnight.  Up IND around room.  Continues to report abd pain, PO dilaudid and tylenol given overnight, pt seemed to sleep well afterwards.  Did c/o HA this AM, unable to given tylenol with current ordered frequency, selin cross cover paged to adjust order.  BGs remain elevated, 259 at 0200.  PIV saline locked.  Per pt, voiding WNL and still having loose/mucous/blood stools.  Makes needs known, will continue to manage pain and follow POC    /81 (BP Location: Right arm)   Pulse 68   Temp 97.7  F (36.5  C) (Oral)   Resp 18   Ht 1.6 m (5' 3\")   Wt 112 kg (247 lb)   SpO2 94%   BMI 43.75 kg/m      "

## 2021-06-09 NOTE — PLAN OF CARE
"Time: 6282-4427     Reason for admission/Dx:   Ulcerative colitis without complications, unspecified location (H) [K51.90]     /72 (BP Location: Right arm)   Pulse 98   Temp 97.6  F (36.4  C) (Oral)   Resp 18   Ht 1.6 m (5' 3\")   Wt 112 kg (247 lb)   SpO2 96%   BMI 43.75 kg/m      Shift Summary: Upon RN-hand off, pt awaiting dispense of discharge meds.  Upon initial pt-RN encounter, pt tearful with delays in receiving discharge meds 2/2 insurance and contemplated staying another night d/t 74 y.o. father dropping her off 1 hour away to home in [place] and then returning tomorrow AM 6/10 for surgery follow-up back here. Ultimately, pt amenable to safe discharge home after discharge meds available for  at 1630. Per pt req, PO dilaudid given for pain, prior to leaving. Updated pt on discharge meds changing (AM NPH to now Lantus @ ).     Plan: Discharged to home 1645.    "

## 2021-06-09 NOTE — PLAN OF CARE
"Time: 6637-4710     Reason for admission: Ulcerative colitis without complications, unspecified location (H) [K51.90]    Vitals: /69 (BP Location: Right arm)   Pulse 88   Temp 97.9  F (36.6  C) (Oral)   Resp 18   Ht 1.6 m (5' 3\")   Wt 112 kg (247 lb)   SpO2 94%   BMI 43.75 kg/m       Activity: IND  Pain: Continued pain on the let side of abdomen that radiates upward to shoulder. IV pain meds switched to PO. Stated that PO wasn't helping as mush as the IV meds were.   Neuro: AxOx4   Cardiac: WDL  Respiratory: RA during the day. 2L NC overnight. CPAP at home.   GI/: 2 loose mucous and bloody BMs, small bright red blood. Voids spontaneously.   Diet:  Orders Placed This Encounter      High Kcal/High Protein Diet, ADULT     Lines:   Peripheral IV 06/06/21 Right;Anterior Lower forearm (Active)   Site Assessment WDL 06/08/21 1500   Line Status Saline locked 06/08/21 1500   Dressing Intervention New dressing  06/07/21 2100   Phlebitis Scale 0-->no symptoms 06/08/21 1500   Infiltration Scale 0 06/08/21 1500   Infiltration Site Treatment Method  None 06/06/21 2345   Number of days: 2      Skin/Wounds: WDL  BG:   Glucose Values Bedside Glucose (mg/dl )  GLUCOSE   Latest Ref Rng & Units - 70 - 99 mg/dL   6/8/2021 -- 221(H)   6/8/2021 -- 370(H)   6/8/2021 -- 154(H)   Some recent data might be hidden       New changes this shift: Pt. Tearful and anxious today. Feels alone and frustrated with health and home life. Emotional support provided. Reassured that she is not alone and has a team of people behind her. Feeling less stressed as night went on. New orders for NPH daily before breakfast placed. Carb counting. And sugar checks w/o coverage at 0200.      Plan: Continue to monitor bloody stools, insulin coverage, steroids, and emotional support. Plan to meet with GI about colectomy options.     "

## 2021-06-09 NOTE — DISCHARGE SUMMARY
"  [facility]   Cardiology II Service / Advanced Heart Failure  Discharge Summary     Doretha Fernandez MRN# [id number]   YOB: 1975 Age: 46 year old     DATE OF ADMISSION: 6/3/2021  DATE OF DISCHARGE: 6/9/2021  ADMITTING PROVIDER: MD Nina   DISCHARGE PROVIDER: Ruby Ball MD   PRIMARY PROVIDER: Anna Naidu      DISCHARGE DIAGNOSES:   Ulcerative Colitis Flare   Acute on Chronic Abdominal Pain     Secondary Diagnoses:   Steroid Induced DM2   Anxiety   MDD   Prominent Adnexal Structures on CT   Hematuria   Nausea   HTN/HLD   GERD   Iron Deficiency Anemia   Vit D Deficiency     ITEMS FOR OUTPATIENT FOLLOW UP:   1. Follow up with Dr. Ram with colorectal surgery on 6/10.   2. Continue Prednisone taper   3. Follow up with PCP within 1-2 weeks       HPI: Please see the detailed H & P from 6/3/2021.    \"Doretha Fernandez is a 45 yo F with PMH of metastatic malignant melanoma in complete remission, adjuvant immunotherapy induced severe colitis on Vedolizumab and prednisone taper (w/ possible plans for colectomy in the future), steroid induced DM2, and chronic pain syndrome who presented to the ED with complaints of rectal bleeding and abdominal pain thought to be secondary to UC flare.      Patient was initially diagnosed with ulcerative colitis 3 years ago and was flare free for several years until 3 months ago when she had strep pharyngitis and received antibiotics. She developed C. Diff after antibiotic treatment and has had multiple flares since then. So far, she has received steroid tapers and two Entyvio infusions for management. Pt states that the infusions have not helped to relieve her symptoms. She has had abdominal pain with associated rectal bleeding for the past two months with worsening symptoms within the past few days, states she had over 10 bloody bowel movements yesterday. Her bowel movements are mucous in nature and without stool.      Currently, she has had 11 " "bowel movements today since 8AM. Again, these bowel movements are mucous and without stool and dark red. She does have rectal pain with her BMs, She worries that she may be developing hemorrhoids as she feels that her rectum is more deformed. She endorses abdominal pain that is diffuse but worse in her lower abdomen. She follows with GI and reached out to Dr. Davey, her GI specialist, who told her to report to the ED. She is very teary during our discussion and states that she is strongly considering colectomy as a more permanent treatment option.\"     HOSPITAL COURSE BY PROBLEM:     Ulcerative Colitis Flare   Presented with abdominal pain and rectal bleeding likely related to ulcerative colitis flare. She has tried steroid tapers multiple times and two Entyvio infusions without relief. Started on IV Methylprednisolone 32mg IV BID for three days during this hospitalization and discharged on a Prednisone taper as noted below. Plan is for patient to follow up with Dr. Ram of colorectal surgery on 6/10 to discuss moving forward with colectomy w/ EI.    - CRS outpatient appointment on Thursday, 6/10  - PTA Bentyl 20mg PO TID   - PTA Bactrim PO daily for ppx while on steroid     - On Prednisone Taper as follows:               - 40mg PO daily for 14 days (6/8 - 6/21)               - 30 mg PO daily for 7 days (6/22 - 6/29)               - 20 mg PO daily for 7 days (6/30 - 7/7)               - 10 mg PO daily for 7 days (7/8 - 7/15)               - 5 mg PO daily for 7 days (7/16 - 7/23)               - 5 mg PO every other day for 7 days (7/25, 7/27, 7/29, 7/31)     Acute on chronic abdominal pain:   - PTA Oxycodone 5 mg PO Q4H PRN   - Discharged on PO Dilaudid 2mg Q2H PRN  - PTA Tylenol 1000mg PO Q8H PRN   - PTA Gabapentin 900mg PO QID      Prominent adnexal structures on CT: Will need pelvic US in future. PCP to follow up on this.      Hematuria: 3RBC on UA, will need to repeat in future. PCP to follow up on this. " "     Nausea:   - PTA Zofran 8mg PO Q8H PRN      Steroid Induced DM2   - PTA insulin lispro 1-7 units QID with meals   - PTA Metformin   - Started on Insulin Lantus 6 units at bedtime      Chronic Problems:   HTN - PTA Amlodipine 5mg PO daily   HLD - PTA Rosuvastatin 10mg PO daily   GERD   - Discontinued PTA Famotidine 10mg PO BID   - PTA Pantoprazole 40mg PO every day    Iron Deficiency Anemia, resolved - PTA iron/vitamin C tablet 65-125mg PO every day   Vitamin D Deficiency - PTA Vitamin D 25 mcg PO daily   Anxiety, MDD: chronic, acutely exacerbated by illness:  - PTA Atarax 25mg PO TID, PTA Venlafaxine 225 mg PO daily        PHYSICAL EXAM:  Blood pressure 120/72, pulse 98, temperature 97.6  F (36.4  C), temperature source Oral, resp. rate 18, height 1.6 m (5' 3\"), weight 112 kg (247 lb), SpO2 96 %, not currently breastfeeding.  General: AAOx3, NAD, obese, intermittently tearful   Skin: Not jaundiced, no rash, no ecchymoses  HEENT: MMM, PERRLA, EOM intact  CV: RRR, normal S1S2, no murmur, clicks, rubs  Resp: Clear to auscultation bilaterally, no wheezes, rhonchi  Abd: Obese, Soft, diffusely tender to palpation, BS+, no masses appreciated  Extremities: warm and well perfused, palpable pulses, peripheral edema present bilaterally   Neuro: No lateralizing symptoms or focal neurologic deficits    LABS:   Last CBC:   Recent Labs   Lab Test 06/09/21  0626   WBC 16.1*   RBC 4.14   HGB 12.7   HCT 39.7   MCV 96   MCH 30.7   MCHC 32.0   RDW 13.1          Last CMP:  Recent Labs   Lab Test 06/09/21  0626 06/04/21  0750 06/04/21  0750      < > 136   POTASSIUM 3.9   < > 4.1   CHLORIDE 104   < > 104   PATRICIA 8.4*   < > 8.3*   CO2 31   < > 28   BUN 23   < > 18   CR 0.70   < > 0.72   *   < > 113*   AST  --   --  12   ALT  --   --  42   BILITOTAL  --   --  0.3   ALBUMIN 3.1*  --  3.2*   PROTTOTAL  --   --  6.2*   ALKPHOS  --   --  54    < > = values in this interval not displayed.       IMAGING:  Results for orders " placed or performed during the hospital encounter of 06/03/21   CT Abdomen Pelvis w Contrast    Narrative    CT abdomen pelvis with contrast    Indication left lower quadrant abdominal pain    COMPARISON: Thyroid 821    Contrast: 135 mL intravenous [id number]    FINDINGS: The included lower chest appears normal.  Within the abdomen and pelvis there is mildly homogeneous appearance  of low density throughout the liver without focal mass or ductal  dilation, suggestive of mild steatosis. The spleen appears normal.  Bilateral adrenal glands appear normal. Bilateral kidneys appear  normal. Atrophy of the body and tail of the pancreas is noted. No  masses in the head region and there is no ductal dilation. Gallbladder  appears grossly normal. No enlarged mesenteric or retroperitoneal  lymph nodes. The abdominal aorta and IVC are normal in size. Urinary  bladder appears grossly normal. Prominent adnexal hypodensities are  present, the left measures 3.5 cm in greatest dimension and the right  measures 2.6 cm in greatest dimension, likely subtly prominent ovaries  with possible follicles. Consider pelvic ultrasound. No free fluid. No  enlarged inguinal or deep pelvic lymph nodes.  Bones show mild degenerative changes in the thoracolumbar spine there  is no suspicious sclerotic or lytic/destructive lesion. Sigmoid colon  is redundant and there is diverticulosis without inflammatory change.      Impression    IMPRESSION: Prominent adnexal structures which may indicate ovarian  follicles, consider pelvic ultrasound. Redundant sigmoid colon with  diverticulosis. No evidence of inflammation. Thoracolumbar  spondylosis. Extensive fatty atrophy of the distal body as well as  tail portions of the pancreas.    STACY ADAME MD       PROCEDURES: None     CONSULTATIONS: GI, CRS, Health Psychology     PENDING RESULTS: None     DISCHARGE MEDICATIONS:  Discharge Medication List as of 6/9/2021  4:39 PM      START taking these  medications    Details   HYDROmorphone (DILAUDID) 2 MG tablet Take 1 tablet (2 mg) by mouth every 2 hours as needed for moderate to severe pain, Disp-30 tablet, R-0, Local Print      insulin glargine (LANTUS PEN) 100 UNIT/ML pen Inject 6 Units Subcutaneous At Bedtime, Disp-3 mL, R-0, E-PrescribeIf Lantus is not covered by insurance, may substitute Basaglar at same dose and frequency.        sulfamethoxazole-trimethoprim (BACTRIM) 400-80 MG tablet Take 1 tablet by mouth daily, Disp-120 tablet, R-0, E-Prescribe         CONTINUE these medications which have CHANGED    Details   amLODIPine (NORVASC) 5 MG tablet Take 1 tablet (5 mg) by mouth daily Needs follow up visit before next refill due, Disp-30 tablet, R-0, E-Prescribe      hydrOXYzine (ATARAX) 25 MG tablet Take 1 tablet (25 mg) by mouth At Bedtime As needed, Disp-30 tablet, R-1, E-Prescribe      oxyCODONE (ROXICODONE) 5 MG tablet Take 1 tablet (5 mg) by mouth every 4 hours as needed for moderate to severe pain, Disp-30 tablet, R-0, Local Print      pantoprazole (PROTONIX) 40 MG EC tablet Take 1 tablet (40 mg) by mouth every morning (before breakfast) Needs follow up visit before next refill due, Disp-30 tablet, R-0, E-Prescribe      !! predniSONE (DELTASONE) 10 MG tablet Take 1 tablet (10 mg) by mouth daily for 14 days, Disp-14 tablet, R-0, E-Prescribe      !! predniSONE (DELTASONE) 20 MG tablet Take 2 tablets (40 mg) by mouth daily for 13 days, Disp-26 tablet, R-0, E-Prescribe      !! predniSONE (DELTASONE) 20 MG tablet Take 1.5 tablets (30 mg) by mouth daily for 14 days, Disp-21 tablet, R-0, E-Prescribe      !! predniSONE (DELTASONE) 20 MG tablet Take 1 tablet (20 mg) by mouth daily for 14 days, Disp-14 tablet, R-0, E-Prescribe      !! predniSONE (DELTASONE) 5 MG tablet Take 1 tablet (5 mg) by mouth daily for 14 days, Disp-14 tablet, R-0, E-Prescribe      !! predniSONE (DELTASONE) 5 MG tablet Take 1 tablet (5 mg) by mouth daily for 7 days, Disp-7 tablet, R-0,  E-Prescribe       !! - Potential duplicate medications found. Please discuss with provider.      CONTINUE these medications which have NOT CHANGED    Details   !! ACCU-CHEK GUIDE test strip USE TO TEST BLOOD SUGAR TWO TIMES A DAY OR AS DIRECTED, Disp-200 strip, R-0, E-Prescribe      acetaminophen (TYLENOL) 500 MG tablet Take 1,000 mg by mouth every 8 hours as needed for mild pain, Historical      Alcohol Swabs (ALCOHOL PREP) 70 % PADS 1 applicator 3 times daily, Disp-100 each, R-0, E-Prescribe      BD BLAYNE U/F 32G X 4 MM insulin pen needle USE AS DIRECTEDDisp-100 each, [id number]      blood glucose (NO BRAND SPECIFIED) lancets standard Use to test blood sugar 3 times daily or as directed.Disp-1 each, [id number]      !! blood glucose (NO BRAND SPECIFIED) test strip Use to test blood sugar 3 times daily or as directed., Disp-200 strip, R-0, E-Prescribe      blood glucose monitoring (ACCU-CHEK FASTCLIX) lancets USE TWO TIMES A DAY OR AS DIRECTED, Disp-102 each, R-9, E-Prescribe      blood glucose monitoring (NO BRAND SPECIFIED) meter device kit Use to test blood sugar 3 times daily or as directed.Disp-1 kit, [id number]      Calcium Carb-Cholecalciferol 600-800 MG-UNIT TABS Take 800 mg by mouth every morning (before breakfast), Historical      calcium carbonate (TUMS) 500 MG chewable tablet Take 1 tablet (500 mg) by mouth 3 times daily as needed for heartburn, Disp-30 tablet, R-0, Local Print      dicyclomine (BENTYL) 20 MG tablet Take 1 tablet (20 mg) by mouth 3 times daily (with meals), Disp-30 tablet, R-0, Local Print      ferrous fumarate 65 mg, [place] FE,-Vitamin C 125 mg (VITRON-C)  MG TABS tablet Take 1 tablet by mouth daily, Disp-30 tablet, R-1, E-PrescribeDUE TO BE SEEN      gabapentin (NEURONTIN) 300 MG capsule TAKE THREE CAPSULES BY MOUTH FOUR TIMES A DAY, Disp-360 capsule, R-0, E-Prescribe      insulin lispro (HUMALOG KWIKPEN) 100 UNIT/ML (1 unit dial) KWIKPEN Inject 1-7 Units Subcutaneous 4  times daily (with meals and nightly) Per the insulin sliding scale provided in the discharge summary., Disp-3 mL, R-2, E-Prescribe      metFORMIN (GLUCOPHAGE-XR) 500 MG 24 hr tablet Take 1,000 mg by mouth Daily with breakfast., Historical      ondansetron (ZOFRAN) 8 MG tablet Take 8 mg by mouth every 8 hours as needed for nausea (Pt has not taken in past 6 months), Historical      PROCTOFOAM HC rectal foam PLACE 1 APPLICATORFUL RECTALLY TWO TIMES A DAY, Disp-10 g, R-1, E-Prescribe      rosuvastatin (CRESTOR) 10 MG tablet Take 1 tablet (10 mg) by mouth daily, Disp-90 tablet, R-3, E-Prescribe      venlafaxine (EFFEXOR-ER) 225 MG 24 hr tablet TAKE ONE TABLET BY MOUTH ONCE DAILY, Disp-90 tablet, R-1, E-Prescribe      VITAMIN D3 25 MCG (1000 UT) tablet TAKE THREE TABLETS BY MOUTH ONCE DAILY, Disp-300 tablet, R-1, E-Prescribe       !! - Potential duplicate medications found. Please discuss with provider.      STOP taking these medications       insulin  UNIT/ML injection Comments:   Reason for Stopping:         sulfamethoxazole-trimethoprim (BACTRIM DS) 800-160 MG tablet Comments:   Reason for Stopping:               DISCHARGE DISPOSITION: Doretha Fernandez will discharge to home in stable condition.     DISCHARGE INSTRUCTIONS:  Discharge Procedure Orders   Weight/Bariatric Adult Referral     Reason for your hospital stay   Order Comments: You were in the hospital for an ulcerative colitis flare. Please follow up with colorectal surgery on 6/10 as planned. Please continue your prednisone taper.     Adult [facility] Follow-up and recommended labs and tests   Order Comments: Follow up with primary care provider, Anna Naidu, within 7 days for hospital follow- up.  The following labs/tests are recommended: CRP.      Appointments on [place] and/or [facility] (with [facility] or [facility] provider or service). Call [phone] if you haven't heard regarding these appointments within 7 days of discharge.     Activity    Order Comments: Your activity upon discharge: activity as tolerated     Order Specific Question Answer Comments   Is discharge order? Yes      Discharge Instructions   Order Comments: You should follow up with Colorectal Surgery on 6/10. Continue with prednisone taper.     Follow Up and recommended labs and tests   Order Comments: Follow up with Dr. Tafoya , at [facility] at 11AM.     Full Code     Order Specific Question Answer Comments   Code status determined by: Discussion with patient/ legal decision maker      Diet   Order Comments: Follow this diet upon discharge: Orders Placed This Encounter      Snacks/Supplements Adult: Gelatein Plus; Between Meals      High Kcal/High Protein Diet, ADULT     Order Specific Question Answer Comments   Is discharge order? Yes        60 minutes spent in discharge, including >50% in counseling and coordination of care, medication review and plan of care recommended on follow up. Questions were answered.     It was our pleasure to care for Doretha Fernandez during this hospitalization. Please do not hesitate to contact me should there be questions regarding the hospital course or discharge plan.      Patient was seen and discussed with Dr. Ball.     Abran Castano MD   Internal Medicine, PGY-2  P: [phone]   6/9/2021
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
## 2021-06-09 NOTE — PROGRESS NOTES
COLON & RECTAL SURGERY  PROGRESS NOTE    SUBJECTIVE:  No acute events. Afebrile, vitals stable. Patient passing bloody bowel movements. Less pain with BMs. Anxious, scared about upcoming surgery plans.     OBJECTIVE:  Temp:  [97.7  F (36.5  C)-98.2  F (36.8  C)] 97.7  F (36.5  C)  Pulse:  [68-88] 68  Resp:  [18-20] 18  BP: (116-121)/(69-81) 121/81  SpO2:  [92 %-94 %] 94 %    Intake/Output Summary (Last 24 hours) at 6/6/2021 0944  Last data filed at 6/5/2021 2000  Gross per 24 hour   Intake 240 ml   Output 1500 ml   Net -1260 ml       GENERAL:  No acute distress  EXTREMITIES: Warm and well perfused, no edema   ABDOMEN:  Soft, appropriately tender, non-distended. No guarding, rigidity, or peritoneal signs.     Labs reviewed.  WBC downtrending  CRP wnl  Lytes wnl    ASSESSMENT/PLAN: 46F hx medically refractory UC with overlying C diff and possible immunotherapy colitis (h/o melanoma) who presented with hematochezia, nausea, abdominal and rectal pain. CRS was consulted to evaluate for TAC w/ EI. She is likely a candidate for surgery and will have further discussions in clinic with Dr. Ram on 6/10. If still inpatient, will discuss plans in house.     - Colorectal clinic appt for 6/10 with Dr. Ram  - Remainder of cares per primary    Seen and discussed with fellow who will discuss with staff.    Poncho Castellanos MD  Surgery PGY-1

## 2021-06-10 ENCOUNTER — PATIENT OUTREACH (OUTPATIENT)
Dept: CARE COORDINATION | Facility: CLINIC | Age: 46
End: 2021-06-10

## 2021-06-10 ENCOUNTER — MYC MEDICAL ADVICE (OUTPATIENT)
Dept: SURGERY | Facility: CLINIC | Age: 46
End: 2021-06-10

## 2021-06-10 ENCOUNTER — VIRTUAL VISIT (OUTPATIENT)
Dept: SURGERY | Facility: CLINIC | Age: 46
End: 2021-06-10
Payer: COMMERCIAL

## 2021-06-10 ENCOUNTER — PRE VISIT (OUTPATIENT)
Dept: SURGERY | Facility: CLINIC | Age: 46
End: 2021-06-10

## 2021-06-10 ENCOUNTER — TEAM CONFERENCE (OUTPATIENT)
Dept: SURGERY | Facility: CLINIC | Age: 46
End: 2021-06-10

## 2021-06-10 VITALS — WEIGHT: 247 LBS | BODY MASS INDEX: 43.77 KG/M2 | HEIGHT: 63 IN

## 2021-06-10 DIAGNOSIS — Z20.822 ENCOUNTER FOR LABORATORY TESTING FOR COVID-19 VIRUS: Primary | ICD-10-CM

## 2021-06-10 DIAGNOSIS — K51.919 ULCERATIVE COLITIS WITH COMPLICATION, UNSPECIFIED LOCATION (H): ICD-10-CM

## 2021-06-10 DIAGNOSIS — Z71.89 OTHER SPECIFIED COUNSELING: ICD-10-CM

## 2021-06-10 PROCEDURE — 99215 OFFICE O/P EST HI 40 MIN: CPT | Mod: 95 | Performed by: SURGERY

## 2021-06-10 RX ORDER — POLYETHYLENE GLYCOL 3350 17 G/17G
238 POWDER, FOR SOLUTION ORAL SEE ADMIN INSTRUCTIONS
Qty: 14 PACKET | Refills: 0 | Status: ON HOLD | OUTPATIENT
Start: 2021-06-10 | End: 2021-06-18

## 2021-06-10 RX ORDER — ONDANSETRON 4 MG/1
4 TABLET, FILM COATED ORAL EVERY 6 HOURS
Qty: 3 TABLET | Refills: 0 | Status: ON HOLD | OUTPATIENT
Start: 2021-06-10 | End: 2021-06-18

## 2021-06-10 RX ORDER — METRONIDAZOLE 500 MG/1
500 TABLET ORAL EVERY 6 HOURS
Qty: 3 TABLET | Refills: 0 | Status: ON HOLD | OUTPATIENT
Start: 2021-06-10 | End: 2021-06-18

## 2021-06-10 RX ORDER — NEOMYCIN SULFATE 500 MG/1
1000 TABLET ORAL EVERY 6 HOURS
Qty: 6 TABLET | Refills: 0 | Status: ON HOLD | OUTPATIENT
Start: 2021-06-10 | End: 2021-06-18

## 2021-06-10 ASSESSMENT — MIFFLIN-ST. JEOR: SCORE: 1734.51

## 2021-06-10 ASSESSMENT — PAIN SCALES - GENERAL: PAINLEVEL: SEVERE PAIN (7)

## 2021-06-10 NOTE — LETTER
6/10/2021       RE: Doretha Fernandez  [address] [facility] [address]     Dear Colleague,    Thank you for referring your patient, Doretha Fernandez, to the [facility] [place] [facility]. Please see a copy of my visit note below.    Doretha is a 46 year old who is being evaluated via a billable video visit.      How would you like to obtain your AVS? MyChart  If the video visit is dropped, the invitation should be resent by: Send to e-mail at: [email or web address]  Will anyone else be joining your video visit? Yes: Dad. How would they like to receive their invitation? Other e-mail: [email or web address]      Video Start Time: 1200  Video-Visit Details    Type of service:  Video Visit    Video End Time:1230    Originating Location (pt. Location): Home    Distant Location (provider location):  [facility] [place]     Platform used for Video Visit: Gabriel      Again, thank you for allowing me to participate in the care of your patient.      Sincerely,    Quinton Ram MD

## 2021-06-10 NOTE — NURSING NOTE
"Chief Complaint   Patient presents with     New Patient     New consult to discuss surgery for ulcerative colitis       Vitals:    06/10/21 0826   Weight: 247 lb   Height: 5' 3\"       Body mass index is 43.75 kg/m .         Lou Pineda CMA    "

## 2021-06-10 NOTE — PROGRESS NOTES
Doretha is a 46 year old who is being evaluated via a billable video visit.      How would you like to obtain your AVS? MyChart  If the video visit is dropped, the invitation should be resent by: Send to e-mail at: [email or web address]  Will anyone else be joining your video visit? Yes: Dad. How would they like to receive their invitation? Other e-mail: [email or web address]      Video Start Time: 1200  Video-Visit Details    Type of service:  Video Visit    Video End Time:1230    Originating Location (pt. Location): Home    Distant Location (provider location):  [facility] RE[facility] [place]     Platform used for Video Visit: [employer]

## 2021-06-10 NOTE — PROGRESS NOTES
Clinic Care Coordination Contact  Community Health Worker Initial Outreach       The CHW reviewed the chart and the patient has a scheduled surgery. The CHW will defer the outreach at this time.

## 2021-06-10 NOTE — PROGRESS NOTES
COLON AND RECTAL SURGERY HUDDLE:    Patient was reviewed in preporation for their surgery the following was reviewed and has been completed:    Surgeon: Dr. Ram     Surgery & Date: 6/15/2021 laparoscopic total abdominal colectomy, end ileostomy    Last MD Note: reviewed    Anesthesia Type: General    Other Providers: No    PAC: Yes- recently admitted and had an H and P    WOC: No--being marked in pre op day of surgery     Labs: Yes    Bowel Prep: Yes MiraLAX / Gatorade , Antibiotic and Magnesium Citrate    Packet: Yes    Imaging: N/A    Post-Op Appointments: No-- will set up after surgery as our  is LAURA    COVID: Yes

## 2021-06-11 DIAGNOSIS — Z20.822 ENCOUNTER FOR LABORATORY TESTING FOR COVID-19 VIRUS: ICD-10-CM

## 2021-06-11 LAB
SARS-COV-2 RNA RESP QL NAA+PROBE: NORMAL
SPECIMEN SOURCE: NORMAL

## 2021-06-11 PROCEDURE — U0005 INFEC AGEN DETEC AMPLI PROBE: HCPCS | Performed by: SURGERY

## 2021-06-11 PROCEDURE — U0003 INFECTIOUS AGENT DETECTION BY NUCLEIC ACID (DNA OR RNA); SEVERE ACUTE RESPIRATORY SYNDROME CORONAVIRUS 2 (SARS-COV-2) (CORONAVIRUS DISEASE [COVID-19]), AMPLIFIED PROBE TECHNIQUE, MAKING USE OF HIGH THROUGHPUT TECHNOLOGIES AS DESCRIBED BY CMS-2020-01-R: HCPCS | Performed by: SURGERY

## 2021-06-11 NOTE — PROGRESS NOTES
Clinic Care Coordination Contact  Patient declined CCC at this time due to she has Chloé an specialty care coordinator that shw works with

## 2021-06-14 ENCOUNTER — ANESTHESIA EVENT (OUTPATIENT)
Dept: SURGERY | Facility: CLINIC | Age: 46
End: 2021-06-14
Payer: COMMERCIAL

## 2021-06-14 NOTE — ANESTHESIA PREPROCEDURE EVALUATION
Anesthesia Pre-Procedure Evaluation    Patient: Doretha Fernandez   MRN: [id number] : 1975        Preoperative Diagnosis: Ulcerative colitis without complications, unspecified location (H) [K51.90]   Procedure : Procedure(s):  laparoscopic total abdominal colectomy, end ileostomy     Past Medical History:   Diagnosis Date     Abnormal MRI     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      Anxiety      Basal cell carcinoma      Cervical high risk HPV (human papillomavirus) test positive 2019    See problem list     Colitis      Depression      Diabetes mellitus, iatrogenic (H) 2020     Inflammatory arthritis      Insomnia      Intestinal giardiasis 3/5/2018     Lumbago     left lower back pain     Lymphedema      Malignant melanoma (H)      Melanoma (H) 10/23/2017     Mild persistent asthma      Obesity      STORMY (obstructive sleep apnea)      Prothrombin deficiency (H)     takes 81mg asa daily     Stroke (cerebrum) (H)     During      TIA (transient ischemic attack)      Type 2 diabetes mellitus (H)       Past Surgical History:   Procedure Laterality Date     APPENDECTOMY       COLONOSCOPY N/A 10/18/2017    Procedure: COLONOSCOPY;  Colon;  Surgeon: Debbie Stephens MD;  [facility]     COLONOSCOPY N/A 3/9/2018    Procedure: COMBINED COLONOSCOPY, SINGLE OR MULTIPLE BIOPSY/POLYPECTOMY BY BIOPSY;  colon;  Surgeon: Benita Schumacher MD;  [facility]     COLONOSCOPY      multiple since  to present - about 6 total     DISSECT LYMPH NODE AXILLA Left 10/23/2017    Procedure: DISSECT LYMPH NODE AXILLA;  Left Axillary Lymph Node Dissection ;  Surgeon: Laurent Cool MD;  [facility]     EXAM UNDER ANESTHESIA PELVIC N/A 2020    Procedure: EXAM UNDER ANESTHESIA, PELVIS; with Cervical Biopsies, Vaginal Biopsy and Endocervical Curettings;  Surgeon: Melina Jung MD;  [facility]     GYN SURGERY  ,          REPAIR MOHS Left 2017     Procedure: REPAIR MOHS;  Left Upper Lid Moh's Reconstruction;  Surgeon: Kisha Bosch MD;  [facility]      Allergies   Allergen Reactions     Bee Venom Swelling     Azithromycin Diarrhea     Erythromycin      Other reaction(s): GI intolerance, Vomiting     Fentanyl Other (See Comments)     sweating  sweating     Prochlorperazine Fatigue     Other reaction(s): Other (see comments)  Fatigue     Buspirone      Other reaction(s): GI intolerance  vomiting     Erythrocin Nausea and Vomiting     Zithromax [Azithromycin Dihydrate] Diarrhea     Enbrel [Etanercept] Hives and Rash      Social History     Tobacco Use     Smoking status: Former Smoker     Packs/day: 1.00     Years: 5.00     Pack years: 5.00     Quit date: 3/20/1998     Years since quittin.2     Smokeless tobacco: Never Used   Substance Use Topics     Alcohol use: Yes     Comment: occ      Wt Readings from Last 1 Encounters:   06/10/21 112 kg (247 lb)        Anesthesia Evaluation   Pt has had prior anesthetic.         ROS/MED HX  ENT/Pulmonary:     (+) sleep apnea, allergic rhinitis, Mild Persistent, asthma     Neurologic:     (+) CVA, date: ,     Cardiovascular:     (+) Dyslipidemia hypertension-----Previous cardiac testing   Echo: Date: 21 Results:  Global and regional left ventricular function is normal with an EF of 55-60%. Right ventricular function, chamber size, wall motion, and thickness are normal.  Stress Test: Date: Results:    ECG Reviewed: Date: 21 Results:  NSR  Cath: Date: Results:      METS/Exercise Tolerance:     Hematologic: Comments: Prothrombin deficiency (on ASA)      Musculoskeletal: Comment: Seronegative RA  (+) arthritis,     GI/Hepatic: Comment: immunotherapy induced severe colitis. Vedolizumab and prednisone taper   Chronic Abdominal pain     (+) GERD,     Renal/Genitourinary:       Endo: Comment: Steroid induced DM    (+) type II DM, Last HgA1c: 6.5, date: 21, Using insulin, Obesity,      Psychiatric/Substance Use:     (+) psychiatric history anxiety and depression     Infectious Disease: Comment: Recurrent C. Diff (on PO vanco since 4/7/21)      Malignancy: Comment: H/o Metastatic melanoma  (+) Malignancy, History of Skin.Skin CA Remission status post.        Other:      (+) , H/O Chronic Pain,        Physical Exam    Airway        Mallampati: II   TM distance: > 3 FB   Neck ROM: full     Respiratory Devices and Support         Dental  no notable dental history         Cardiovascular          Rhythm and rate: regular and normal     Pulmonary   pulmonary exam normal                OUTSIDE LABS:  CBC:   Lab Results   Component Value Date    WBC 16.1 (H) 06/09/2021    WBC 17.7 (H) 06/08/2021    HGB 12.7 06/09/2021    HGB 11.7 06/08/2021    HCT 39.7 06/09/2021    HCT 36.9 06/08/2021     06/09/2021     06/08/2021     BMP:   Lab Results   Component Value Date     06/09/2021     06/08/2021    POTASSIUM 3.9 06/09/2021    POTASSIUM 4.2 06/08/2021    CHLORIDE 104 06/09/2021    CHLORIDE 102 06/08/2021    CO2 31 06/09/2021    CO2 30 06/08/2021    BUN 23 06/09/2021    BUN 25 06/08/2021    CR 0.70 06/09/2021    CR 0.79 06/08/2021     (H) 06/09/2021     (H) 06/08/2021     COAGS:   Lab Results   Component Value Date    PTT 23 05/06/2021    INR 0.96 05/06/2021     POC:   Lab Results   Component Value Date     (H) 06/09/2021    HCG Negative 04/13/2021    HCGS Negative 09/06/2020     HEPATIC:   Lab Results   Component Value Date    ALBUMIN 3.1 (L) 06/09/2021    PROTTOTAL 6.2 (L) 06/04/2021    ALT 42 06/04/2021    AST 12 06/04/2021    ALKPHOS 54 06/04/2021    BILITOTAL 0.3 06/04/2021     OTHER:   Lab Results   Component Value Date    LACT 2.9 (H) 05/06/2021    A1C 6.5 (H) 05/11/2021    PATRICIA 8.4 (L) 06/09/2021    PHOS 2.7 05/06/2021    MAG 2.6 (H) 05/06/2021    LIPASE 53 (L) 06/03/2021    TSH 2.5 06/09/2020    T4 0.90 10/04/2017    CRP 5.3 06/09/2021    SED 9 06/03/2021        Anesthesia Plan    ASA Status:  3   NPO Status:  NPO Appropriate    Anesthesia Type: General.     - Airway: ETT   Induction: Intravenous.   Maintenance: Balanced.   Techniques and Equipment:     - Airway: Video-Laryngoscope, Fiberoptic Bronchoscope     - Lines/Monitors: 2nd IV, BIS     Consents    Anesthesia Plan(s) and associated risks, benefits, and realistic alternatives discussed. Questions answered and patient/representative(s) expressed understanding.     - Discussed with:  Patient      - Extended Intubation/Ventilatory Support Discussed: No.      - Patient is DNR/DNI Status: No    Use of blood products discussed: No .     Postoperative Care    Pain management: IV analgesics, Multi-modal analgesia, Peripheral nerve block (Single Shot).   PONV prophylaxis: Ondansetron (or other 5HT-3), Dexamethasone or Solumedrol     Comments:                Laina Cornell MD

## 2021-06-15 ENCOUNTER — DOCUMENTATION ONLY (OUTPATIENT)
Dept: SURGERY | Facility: CLINIC | Age: 46
End: 2021-06-15

## 2021-06-15 ENCOUNTER — ANESTHESIA (OUTPATIENT)
Dept: SURGERY | Facility: CLINIC | Age: 46
End: 2021-06-15
Payer: COMMERCIAL

## 2021-06-15 ENCOUNTER — HOSPITAL ENCOUNTER (INPATIENT)
Facility: CLINIC | Age: 46
LOS: 4 days | Discharge: HOME HEALTH | End: 2021-06-19
Attending: SURGERY | Admitting: SURGERY
Payer: COMMERCIAL

## 2021-06-15 ENCOUNTER — PATIENT OUTREACH (OUTPATIENT)
Dept: GASTROENTEROLOGY | Facility: CLINIC | Age: 46
End: 2021-06-15

## 2021-06-15 DIAGNOSIS — F41.1 ANXIETY STATE: Chronic | ICD-10-CM

## 2021-06-15 DIAGNOSIS — G47.00 PERSISTENT INSOMNIA: ICD-10-CM

## 2021-06-15 DIAGNOSIS — G89.18 ACUTE POST-OPERATIVE PAIN: Primary | ICD-10-CM

## 2021-06-15 DIAGNOSIS — K51.90 ULCERATIVE COLITIS WITHOUT COMPLICATIONS, UNSPECIFIED LOCATION (H): ICD-10-CM

## 2021-06-15 LAB
ABO + RH BLD: NORMAL
ABO + RH BLD: NORMAL
BLD GP AB SCN SERPL QL: NORMAL
BLOOD BANK CMNT PATIENT-IMP: NORMAL
CREAT SERPL-MCNC: 0.68 MG/DL (ref 0.52–1.04)
GFR SERPL CREATININE-BSD FRML MDRD: >90 ML/MIN/{1.73_M2}
GLUCOSE BLDC GLUCOMTR-MCNC: 137 MG/DL (ref 70–99)
GLUCOSE BLDC GLUCOMTR-MCNC: 145 MG/DL (ref 70–99)
GLUCOSE BLDC GLUCOMTR-MCNC: 155 MG/DL (ref 70–99)
GLUCOSE BLDC GLUCOMTR-MCNC: 181 MG/DL (ref 70–99)
GLUCOSE BLDC GLUCOMTR-MCNC: 181 MG/DL (ref 70–99)
GLUCOSE BLDC GLUCOMTR-MCNC: 200 MG/DL (ref 70–99)
GLUCOSE BLDC GLUCOMTR-MCNC: 210 MG/DL (ref 70–99)
HCG UR QL: NEGATIVE
HGB BLD-MCNC: 12.9 G/DL (ref 11.7–15.7)
SPECIMEN EXP DATE BLD: NORMAL

## 2021-06-15 PROCEDURE — 86901 BLOOD TYPING SEROLOGIC RH(D): CPT | Performed by: ANESTHESIOLOGY

## 2021-06-15 PROCEDURE — 250N000025 HC SEVOFLURANE, PER MIN: Performed by: SURGERY

## 2021-06-15 PROCEDURE — 250N000011 HC RX IP 250 OP 636: Performed by: STUDENT IN AN ORGANIZED HEALTH CARE EDUCATION/TRAINING PROGRAM

## 2021-06-15 PROCEDURE — C9290 INJ, BUPIVACAINE LIPOSOME: HCPCS | Performed by: ANESTHESIOLOGY

## 2021-06-15 PROCEDURE — 86850 RBC ANTIBODY SCREEN: CPT | Performed by: ANESTHESIOLOGY

## 2021-06-15 PROCEDURE — 0DTE4ZZ RESECTION OF LARGE INTESTINE, PERCUTANEOUS ENDOSCOPIC APPROACH: ICD-10-PCS | Performed by: SURGERY

## 2021-06-15 PROCEDURE — 999N001017 HC STATISTIC GLUCOSE BY METER IP

## 2021-06-15 PROCEDURE — 272N000001 HC OR GENERAL SUPPLY STERILE: Performed by: SURGERY

## 2021-06-15 PROCEDURE — 250N000011 HC RX IP 250 OP 636: Performed by: ANESTHESIOLOGY

## 2021-06-15 PROCEDURE — 250N000011 HC RX IP 250 OP 636: Performed by: PHYSICIAN ASSISTANT

## 2021-06-15 PROCEDURE — 360N000077 HC SURGERY LEVEL 4, PER MIN: Performed by: SURGERY

## 2021-06-15 PROCEDURE — 250N000013 HC RX MED GY IP 250 OP 250 PS 637: Performed by: STUDENT IN AN ORGANIZED HEALTH CARE EDUCATION/TRAINING PROGRAM

## 2021-06-15 PROCEDURE — 258N000003 HC RX IP 258 OP 636: Performed by: STUDENT IN AN ORGANIZED HEALTH CARE EDUCATION/TRAINING PROGRAM

## 2021-06-15 PROCEDURE — 88307 TISSUE EXAM BY PATHOLOGIST: CPT | Mod: TC | Performed by: SURGERY

## 2021-06-15 PROCEDURE — 88307 TISSUE EXAM BY PATHOLOGIST: CPT | Mod: 26 | Performed by: PATHOLOGY

## 2021-06-15 PROCEDURE — 250N000011 HC RX IP 250 OP 636

## 2021-06-15 PROCEDURE — 370N000017 HC ANESTHESIA TECHNICAL FEE, PER MIN: Performed by: SURGERY

## 2021-06-15 PROCEDURE — 250N000011 HC RX IP 250 OP 636: Performed by: NURSE ANESTHETIST, CERTIFIED REGISTERED

## 2021-06-15 PROCEDURE — 710N000010 HC RECOVERY PHASE 1, LEVEL 2, PER MIN: Performed by: SURGERY

## 2021-06-15 PROCEDURE — 250N000009 HC RX 250: Performed by: STUDENT IN AN ORGANIZED HEALTH CARE EDUCATION/TRAINING PROGRAM

## 2021-06-15 PROCEDURE — 0D1B4Z4 BYPASS ILEUM TO CUTANEOUS, PERCUTANEOUS ENDOSCOPIC APPROACH: ICD-10-PCS | Performed by: SURGERY

## 2021-06-15 PROCEDURE — 86900 BLOOD TYPING SEROLOGIC ABO: CPT | Performed by: ANESTHESIOLOGY

## 2021-06-15 PROCEDURE — 999N000198 HC STATISTIC WOC PT EDUCATION, 16-30 MIN

## 2021-06-15 PROCEDURE — 82565 ASSAY OF CREATININE: CPT | Performed by: STUDENT IN AN ORGANIZED HEALTH CARE EDUCATION/TRAINING PROGRAM

## 2021-06-15 PROCEDURE — 120N000002 HC R&B MED SURG/OB UMMC

## 2021-06-15 PROCEDURE — 999N000141 HC STATISTIC PRE-PROCEDURE NURSING ASSESSMENT: Performed by: SURGERY

## 2021-06-15 PROCEDURE — 36416 COLLJ CAPILLARY BLOOD SPEC: CPT | Performed by: STUDENT IN AN ORGANIZED HEALTH CARE EDUCATION/TRAINING PROGRAM

## 2021-06-15 PROCEDURE — 36415 COLL VENOUS BLD VENIPUNCTURE: CPT | Performed by: ANESTHESIOLOGY

## 2021-06-15 PROCEDURE — 81025 URINE PREGNANCY TEST: CPT | Performed by: STUDENT IN AN ORGANIZED HEALTH CARE EDUCATION/TRAINING PROGRAM

## 2021-06-15 PROCEDURE — 258N000003 HC RX IP 258 OP 636: Performed by: ANESTHESIOLOGY

## 2021-06-15 PROCEDURE — 250N000009 HC RX 250: Performed by: NURSE ANESTHETIST, CERTIFIED REGISTERED

## 2021-06-15 PROCEDURE — 85018 HEMOGLOBIN: CPT | Performed by: ANESTHESIOLOGY

## 2021-06-15 RX ORDER — KETAMINE HYDROCHLORIDE 10 MG/ML
INJECTION INTRAMUSCULAR; INTRAVENOUS PRN
Status: DISCONTINUED | OUTPATIENT
Start: 2021-06-15 | End: 2021-06-15

## 2021-06-15 RX ORDER — CEFAZOLIN SODIUM 2 G/100ML
2 INJECTION, SOLUTION INTRAVENOUS
Status: DISCONTINUED | OUTPATIENT
Start: 2021-06-15 | End: 2021-06-15 | Stop reason: HOSPADM

## 2021-06-15 RX ORDER — FLUMAZENIL 0.1 MG/ML
0.2 INJECTION, SOLUTION INTRAVENOUS
Status: DISCONTINUED | OUTPATIENT
Start: 2021-06-15 | End: 2021-06-15 | Stop reason: HOSPADM

## 2021-06-15 RX ORDER — ONDANSETRON 2 MG/ML
INJECTION INTRAMUSCULAR; INTRAVENOUS PRN
Status: DISCONTINUED | OUTPATIENT
Start: 2021-06-15 | End: 2021-06-15

## 2021-06-15 RX ORDER — NALOXONE HYDROCHLORIDE 0.4 MG/ML
0.2 INJECTION, SOLUTION INTRAMUSCULAR; INTRAVENOUS; SUBCUTANEOUS
Status: DISCONTINUED | OUTPATIENT
Start: 2021-06-15 | End: 2021-06-15 | Stop reason: HOSPADM

## 2021-06-15 RX ORDER — SODIUM CHLORIDE, SODIUM LACTATE, POTASSIUM CHLORIDE, CALCIUM CHLORIDE 600; 310; 30; 20 MG/100ML; MG/100ML; MG/100ML; MG/100ML
INJECTION, SOLUTION INTRAVENOUS CONTINUOUS
Status: DISCONTINUED | OUTPATIENT
Start: 2021-06-15 | End: 2021-06-15 | Stop reason: HOSPADM

## 2021-06-15 RX ORDER — BUPIVACAINE HYDROCHLORIDE 2.5 MG/ML
INJECTION, SOLUTION EPIDURAL; INFILTRATION; INTRACAUDAL
Status: COMPLETED | OUTPATIENT
Start: 2021-06-15 | End: 2021-06-15

## 2021-06-15 RX ORDER — HYDROMORPHONE HCL IN WATER/PF 6 MG/30 ML
0.2 PATIENT CONTROLLED ANALGESIA SYRINGE INTRAVENOUS
Status: DISCONTINUED | OUTPATIENT
Start: 2021-06-15 | End: 2021-06-17

## 2021-06-15 RX ORDER — NALOXONE HYDROCHLORIDE 0.4 MG/ML
0.4 INJECTION, SOLUTION INTRAMUSCULAR; INTRAVENOUS; SUBCUTANEOUS
Status: DISCONTINUED | OUTPATIENT
Start: 2021-06-15 | End: 2021-06-19 | Stop reason: HOSPADM

## 2021-06-15 RX ORDER — ACETAMINOPHEN 325 MG/1
650 TABLET ORAL EVERY 4 HOURS PRN
Status: DISCONTINUED | OUTPATIENT
Start: 2021-06-18 | End: 2021-06-16

## 2021-06-15 RX ORDER — CEFAZOLIN SODIUM 2 G/100ML
2 INJECTION, SOLUTION INTRAVENOUS SEE ADMIN INSTRUCTIONS
Status: DISCONTINUED | OUTPATIENT
Start: 2021-06-15 | End: 2021-06-15 | Stop reason: HOSPADM

## 2021-06-15 RX ORDER — PROPOFOL 10 MG/ML
INJECTION, EMULSION INTRAVENOUS PRN
Status: DISCONTINUED | OUTPATIENT
Start: 2021-06-15 | End: 2021-06-15

## 2021-06-15 RX ORDER — LABETALOL HYDROCHLORIDE 5 MG/ML
10 INJECTION, SOLUTION INTRAVENOUS
Status: DISCONTINUED | OUTPATIENT
Start: 2021-06-15 | End: 2021-06-15 | Stop reason: HOSPADM

## 2021-06-15 RX ORDER — LIDOCAINE 40 MG/G
CREAM TOPICAL
Status: DISCONTINUED | OUTPATIENT
Start: 2021-06-15 | End: 2021-06-15 | Stop reason: HOSPADM

## 2021-06-15 RX ORDER — NALOXONE HYDROCHLORIDE 0.4 MG/ML
0.2 INJECTION, SOLUTION INTRAMUSCULAR; INTRAVENOUS; SUBCUTANEOUS
Status: DISCONTINUED | OUTPATIENT
Start: 2021-06-15 | End: 2021-06-19 | Stop reason: HOSPADM

## 2021-06-15 RX ORDER — ONDANSETRON 2 MG/ML
4 INJECTION INTRAMUSCULAR; INTRAVENOUS EVERY 6 HOURS PRN
Status: DISCONTINUED | OUTPATIENT
Start: 2021-06-15 | End: 2021-06-19 | Stop reason: HOSPADM

## 2021-06-15 RX ORDER — ONDANSETRON 4 MG/1
4 TABLET, ORALLY DISINTEGRATING ORAL EVERY 30 MIN PRN
Status: DISCONTINUED | OUTPATIENT
Start: 2021-06-15 | End: 2021-06-15 | Stop reason: HOSPADM

## 2021-06-15 RX ORDER — EPHEDRINE SULFATE 50 MG/ML
INJECTION, SOLUTION INTRAMUSCULAR; INTRAVENOUS; SUBCUTANEOUS PRN
Status: DISCONTINUED | OUTPATIENT
Start: 2021-06-15 | End: 2021-06-15

## 2021-06-15 RX ORDER — ACETAMINOPHEN 325 MG/1
975 TABLET ORAL EVERY 8 HOURS
Status: DISCONTINUED | OUTPATIENT
Start: 2021-06-15 | End: 2021-06-16

## 2021-06-15 RX ORDER — AMLODIPINE BESYLATE 5 MG/1
5 TABLET ORAL DAILY
Status: DISCONTINUED | OUTPATIENT
Start: 2021-06-15 | End: 2021-06-19 | Stop reason: HOSPADM

## 2021-06-15 RX ORDER — LIDOCAINE HYDROCHLORIDE 20 MG/ML
INJECTION, SOLUTION INFILTRATION; PERINEURAL PRN
Status: DISCONTINUED | OUTPATIENT
Start: 2021-06-15 | End: 2021-06-15

## 2021-06-15 RX ORDER — OXYCODONE HYDROCHLORIDE 5 MG/1
5-10 TABLET ORAL EVERY 4 HOURS PRN
Status: DISCONTINUED | OUTPATIENT
Start: 2021-06-15 | End: 2021-06-15

## 2021-06-15 RX ORDER — HYDROMORPHONE HCL IN WATER/PF 6 MG/30 ML
0.4 PATIENT CONTROLLED ANALGESIA SYRINGE INTRAVENOUS
Status: DISCONTINUED | OUTPATIENT
Start: 2021-06-15 | End: 2021-06-17

## 2021-06-15 RX ORDER — NALOXONE HYDROCHLORIDE 0.4 MG/ML
0.4 INJECTION, SOLUTION INTRAMUSCULAR; INTRAVENOUS; SUBCUTANEOUS
Status: DISCONTINUED | OUTPATIENT
Start: 2021-06-15 | End: 2021-06-15 | Stop reason: HOSPADM

## 2021-06-15 RX ORDER — DEXAMETHASONE SODIUM PHOSPHATE 4 MG/ML
INJECTION, SOLUTION INTRA-ARTICULAR; INTRALESIONAL; INTRAMUSCULAR; INTRAVENOUS; SOFT TISSUE PRN
Status: DISCONTINUED | OUTPATIENT
Start: 2021-06-15 | End: 2021-06-15

## 2021-06-15 RX ORDER — ONDANSETRON 2 MG/ML
4 INJECTION INTRAMUSCULAR; INTRAVENOUS ONCE
Status: COMPLETED | OUTPATIENT
Start: 2021-06-15 | End: 2021-06-15

## 2021-06-15 RX ORDER — ONDANSETRON 4 MG/1
4 TABLET, ORALLY DISINTEGRATING ORAL EVERY 6 HOURS PRN
Status: DISCONTINUED | OUTPATIENT
Start: 2021-06-15 | End: 2021-06-19 | Stop reason: HOSPADM

## 2021-06-15 RX ORDER — ACETAMINOPHEN 325 MG/1
975 TABLET ORAL ONCE
Status: DISCONTINUED | OUTPATIENT
Start: 2021-06-15 | End: 2021-06-15 | Stop reason: HOSPADM

## 2021-06-15 RX ORDER — HYDRALAZINE HYDROCHLORIDE 20 MG/ML
2.5-5 INJECTION INTRAMUSCULAR; INTRAVENOUS EVERY 10 MIN PRN
Status: DISCONTINUED | OUTPATIENT
Start: 2021-06-15 | End: 2021-06-15 | Stop reason: HOSPADM

## 2021-06-15 RX ORDER — LIDOCAINE 40 MG/G
CREAM TOPICAL
Status: DISCONTINUED | OUTPATIENT
Start: 2021-06-15 | End: 2021-06-19 | Stop reason: HOSPADM

## 2021-06-15 RX ORDER — ONDANSETRON 2 MG/ML
4 INJECTION INTRAMUSCULAR; INTRAVENOUS EVERY 30 MIN PRN
Status: DISCONTINUED | OUTPATIENT
Start: 2021-06-15 | End: 2021-06-15 | Stop reason: HOSPADM

## 2021-06-15 RX ORDER — SODIUM CHLORIDE, SODIUM LACTATE, POTASSIUM CHLORIDE, CALCIUM CHLORIDE 600; 310; 30; 20 MG/100ML; MG/100ML; MG/100ML; MG/100ML
INJECTION, SOLUTION INTRAVENOUS CONTINUOUS
Status: DISCONTINUED | OUTPATIENT
Start: 2021-06-15 | End: 2021-06-17

## 2021-06-15 RX ORDER — ROSUVASTATIN CALCIUM 10 MG/1
10 TABLET, COATED ORAL DAILY
Status: DISCONTINUED | OUTPATIENT
Start: 2021-06-15 | End: 2021-06-19 | Stop reason: HOSPADM

## 2021-06-15 RX ORDER — OXYCODONE HYDROCHLORIDE 5 MG/1
5-10 TABLET ORAL EVERY 4 HOURS PRN
Status: DISCONTINUED | OUTPATIENT
Start: 2021-06-15 | End: 2021-06-17

## 2021-06-15 RX ORDER — ACETAMINOPHEN 325 MG/1
975 TABLET ORAL ONCE
Status: COMPLETED | OUTPATIENT
Start: 2021-06-15 | End: 2021-06-15

## 2021-06-15 RX ORDER — HYDROMORPHONE HYDROCHLORIDE 1 MG/ML
.3-.5 INJECTION, SOLUTION INTRAMUSCULAR; INTRAVENOUS; SUBCUTANEOUS EVERY 5 MIN PRN
Status: DISCONTINUED | OUTPATIENT
Start: 2021-06-15 | End: 2021-06-15 | Stop reason: HOSPADM

## 2021-06-15 RX ORDER — LORAZEPAM 2 MG/ML
1-2 INJECTION INTRAMUSCULAR ONCE
Status: COMPLETED | OUTPATIENT
Start: 2021-06-15 | End: 2021-06-15

## 2021-06-15 RX ORDER — VENLAFAXINE HYDROCHLORIDE 225 MG/1
225 TABLET, EXTENDED RELEASE ORAL DAILY
Status: DISCONTINUED | OUTPATIENT
Start: 2021-06-15 | End: 2021-06-19 | Stop reason: HOSPADM

## 2021-06-15 RX ADMIN — HYDROMORPHONE HYDROCHLORIDE 0.5 MG: 1 INJECTION, SOLUTION INTRAMUSCULAR; INTRAVENOUS; SUBCUTANEOUS at 15:55

## 2021-06-15 RX ADMIN — PHENYLEPHRINE HYDROCHLORIDE 100 MCG: 10 INJECTION INTRAVENOUS at 11:58

## 2021-06-15 RX ADMIN — HYDROCORTISONE SODIUM SUCCINATE 50 MG: 100 INJECTION, POWDER, FOR SOLUTION INTRAMUSCULAR; INTRAVENOUS at 10:45

## 2021-06-15 RX ADMIN — SUGAMMADEX 200 MG: 100 INJECTION, SOLUTION INTRAVENOUS at 15:47

## 2021-06-15 RX ADMIN — OXYCODONE HYDROCHLORIDE 10 MG: 5 TABLET ORAL at 21:36

## 2021-06-15 RX ADMIN — SODIUM CHLORIDE, POTASSIUM CHLORIDE, SODIUM LACTATE AND CALCIUM CHLORIDE: 600; 310; 30; 20 INJECTION, SOLUTION INTRAVENOUS at 10:20

## 2021-06-15 RX ADMIN — KETAMINE HYDROCHLORIDE 10 MG/HR: 100 INJECTION, SOLUTION, CONCENTRATE INTRAMUSCULAR; INTRAVENOUS at 11:20

## 2021-06-15 RX ADMIN — MIDAZOLAM 2 MG: 1 INJECTION INTRAMUSCULAR; INTRAVENOUS at 09:38

## 2021-06-15 RX ADMIN — PHENYLEPHRINE HYDROCHLORIDE 100 MCG: 10 INJECTION INTRAVENOUS at 11:35

## 2021-06-15 RX ADMIN — SODIUM CHLORIDE, POTASSIUM CHLORIDE, SODIUM LACTATE AND CALCIUM CHLORIDE: 600; 310; 30; 20 INJECTION, SOLUTION INTRAVENOUS at 10:45

## 2021-06-15 RX ADMIN — ONDANSETRON 4 MG: 2 INJECTION INTRAMUSCULAR; INTRAVENOUS at 15:20

## 2021-06-15 RX ADMIN — PHENYLEPHRINE HYDROCHLORIDE 100 MCG: 10 INJECTION INTRAVENOUS at 15:24

## 2021-06-15 RX ADMIN — ENOXAPARIN SODIUM 40 MG: 40 INJECTION SUBCUTANEOUS at 08:48

## 2021-06-15 RX ADMIN — HYDROMORPHONE HYDROCHLORIDE 0.4 MG: 0.2 INJECTION, SOLUTION INTRAMUSCULAR; INTRAVENOUS; SUBCUTANEOUS at 19:18

## 2021-06-15 RX ADMIN — PROPOFOL 200 MG: 10 INJECTION, EMULSION INTRAVENOUS at 10:32

## 2021-06-15 RX ADMIN — SODIUM CHLORIDE, POTASSIUM CHLORIDE, SODIUM LACTATE AND CALCIUM CHLORIDE: 600; 310; 30; 20 INJECTION, SOLUTION INTRAVENOUS at 16:29

## 2021-06-15 RX ADMIN — METRONIDAZOLE 500 MG: 500 INJECTION, SOLUTION INTRAVENOUS at 11:15

## 2021-06-15 RX ADMIN — BUPIVACAINE HYDROCHLORIDE 20 ML: 2.5 INJECTION, SOLUTION EPIDURAL; INFILTRATION; INTRACAUDAL; PERINEURAL at 09:46

## 2021-06-15 RX ADMIN — ONDANSETRON 4 MG: 2 INJECTION INTRAMUSCULAR; INTRAVENOUS at 09:51

## 2021-06-15 RX ADMIN — HYDROMORPHONE HYDROCHLORIDE 1 MG: 1 INJECTION, SOLUTION INTRAMUSCULAR; INTRAVENOUS; SUBCUTANEOUS at 11:30

## 2021-06-15 RX ADMIN — SODIUM CHLORIDE, POTASSIUM CHLORIDE, SODIUM LACTATE AND CALCIUM CHLORIDE: 600; 310; 30; 20 INJECTION, SOLUTION INTRAVENOUS at 21:01

## 2021-06-15 RX ADMIN — LORAZEPAM 1 MG: 2 INJECTION INTRAMUSCULAR; INTRAVENOUS at 16:44

## 2021-06-15 RX ADMIN — PHENYLEPHRINE HYDROCHLORIDE 200 MCG: 10 INJECTION INTRAVENOUS at 11:06

## 2021-06-15 RX ADMIN — HYDROMORPHONE HYDROCHLORIDE 0.4 MG: 0.2 INJECTION, SOLUTION INTRAMUSCULAR; INTRAVENOUS; SUBCUTANEOUS at 21:52

## 2021-06-15 RX ADMIN — ROCURONIUM BROMIDE 30 MG: 10 INJECTION INTRAVENOUS at 11:21

## 2021-06-15 RX ADMIN — HYDROMORPHONE HYDROCHLORIDE 0.5 MG: 1 INJECTION, SOLUTION INTRAMUSCULAR; INTRAVENOUS; SUBCUTANEOUS at 14:20

## 2021-06-15 RX ADMIN — ROCURONIUM BROMIDE 10 MG: 10 INJECTION INTRAVENOUS at 13:53

## 2021-06-15 RX ADMIN — MIDAZOLAM 1 MG: 1 INJECTION INTRAMUSCULAR; INTRAVENOUS at 10:23

## 2021-06-15 RX ADMIN — HYDROMORPHONE HYDROCHLORIDE 0.5 MG: 1 INJECTION, SOLUTION INTRAMUSCULAR; INTRAVENOUS; SUBCUTANEOUS at 16:25

## 2021-06-15 RX ADMIN — Medication 5 MG: at 11:06

## 2021-06-15 RX ADMIN — ACETAMINOPHEN 975 MG: 325 TABLET, FILM COATED ORAL at 19:18

## 2021-06-15 RX ADMIN — ACETAMINOPHEN 975 MG: 325 TABLET, FILM COATED ORAL at 08:15

## 2021-06-15 RX ADMIN — Medication 50 MG: at 10:36

## 2021-06-15 RX ADMIN — PHENYLEPHRINE HYDROCHLORIDE 100 MCG: 10 INJECTION INTRAVENOUS at 12:57

## 2021-06-15 RX ADMIN — HUMAN INSULIN 2 UNITS/HR: 100 INJECTION, SOLUTION SUBCUTANEOUS at 13:20

## 2021-06-15 RX ADMIN — ONDANSETRON 4 MG: 2 INJECTION INTRAMUSCULAR; INTRAVENOUS at 16:25

## 2021-06-15 RX ADMIN — HYDROCORTISONE SODIUM SUCCINATE 50 MG: 100 INJECTION, POWDER, FOR SOLUTION INTRAMUSCULAR; INTRAVENOUS at 21:18

## 2021-06-15 RX ADMIN — DEXAMETHASONE SODIUM PHOSPHATE 4 MG: 4 INJECTION, SOLUTION INTRA-ARTICULAR; INTRALESIONAL; INTRAMUSCULAR; INTRAVENOUS; SOFT TISSUE at 15:22

## 2021-06-15 RX ADMIN — BUPIVACAINE 20 ML: 13.3 INJECTION, SUSPENSION, LIPOSOMAL INFILTRATION at 09:46

## 2021-06-15 RX ADMIN — LIDOCAINE HYDROCHLORIDE 100 MG: 20 INJECTION, SOLUTION INFILTRATION; PERINEURAL at 10:24

## 2021-06-15 RX ADMIN — ROCURONIUM BROMIDE 20 MG: 10 INJECTION INTRAVENOUS at 15:09

## 2021-06-15 RX ADMIN — Medication 5 MG: at 11:34

## 2021-06-15 RX ADMIN — ROCURONIUM BROMIDE 20 MG: 10 INJECTION INTRAVENOUS at 12:15

## 2021-06-15 RX ADMIN — PHENYLEPHRINE HYDROCHLORIDE 100 MCG: 10 INJECTION INTRAVENOUS at 14:52

## 2021-06-15 RX ADMIN — HYDROMORPHONE HYDROCHLORIDE 0.5 MG: 1 INJECTION, SOLUTION INTRAMUSCULAR; INTRAVENOUS; SUBCUTANEOUS at 16:13

## 2021-06-15 RX ADMIN — Medication 5 MG: at 11:58

## 2021-06-15 RX ADMIN — PROPOFOL 50 MG: 10 INJECTION, EMULSION INTRAVENOUS at 10:37

## 2021-06-15 RX ADMIN — Medication 5 MG: at 12:57

## 2021-06-15 RX ADMIN — Medication 2 G: at 11:05

## 2021-06-15 RX ADMIN — ROCURONIUM BROMIDE 40 MG: 10 INJECTION INTRAVENOUS at 10:30

## 2021-06-15 ASSESSMENT — ACTIVITIES OF DAILY LIVING (ADL): ADLS_ACUITY_SCORE: 14

## 2021-06-15 NOTE — BRIEF OP NOTE
[facility]    Brief Operative Note    Pre-operative diagnosis: Ulcerative colitis without complications, unspecified location (H) [K51.90]  Post-operative diagnosis Same as pre-operative diagnosis    Procedure: Procedure(s):  laparoscopic total abdominal colectomy, end ileostomy  Surgeon: Surgeon(s) and Role:     * Quinton Ram MD - Primary     * Vlad Flores MD - Assisting     * Lowell Gould MD - Resident - Assisting  Anesthesia: Combined General with Block   Estimated blood loss: Less than 50 ml  Drains: None  Specimens:   ID Type Source Tests Collected by Time Destination   A : Colon Tissue Colon SURGICAL PATHOLOGY EXAM Quinton Ram MD 6/15/2021  2:49 PM      Findings: Stump stapled at top of rectum  Complications: None.  Implants: * No implants in log *      Stable to PACU    Lowell Gould MD PhD  CRS fellow

## 2021-06-15 NOTE — CONFIDENTIAL NOTE
"Colon and Rectal Surgery Video Clinic Note    RE: Doretha Fernandez.  : 1975.  JUNIOR: 6/10/2021.    Doretha Fernandez is a 46 year old female who is being evaluated via a billable video visit.      The patient has been notified of following:     \"This video visit will be conducted via a call between you and your physician/provider. We have found that certain health care needs can be provided without the need for an in-person physical exam.  This service lets us provide the care you need with a video conversation.  If a prescription is necessary we can send it directly to your pharmacy.  If lab work is needed we can place an order for that and you can then stop by our lab to have the test done at a later time.    Video visits are billed at different rates depending on your insurance coverage.  Please reach out to your insurance provider with any questions.    If during the course of the call the physician/provider feels a video visit is not appropriate, you will not be charged for this service.\"    Patient has given verbal consent for Video visit? Yes    Video Start Time: 1200      Reason for visit: discuss colectomy.    HPI:     Doretha Fernandez is a 47 y/o lady that I know from her recent hospitalization for UC flare.  For the past 3 years she has been having acute on chronic flares of UC.  During these flares she requires IV steroids and hospital admission and at least 3 have occurred in the past several months.  I saw her in the hospital earlier in the week and she actually discharged yesterday on po steroids.  She has required oral steroids for the past 3 years and has never been able to wean off of them for any significant amount of time.  The hospitalizations are usually due to a combination of abdominal pain and frequent diarrhea, often bloody.  This has had a devastating effect on her quality of life.  Additionally, she has developed Cushing's syndrome from the chronic steroid use and is now at least +65 lbs " from her starting weight.    She was trialed on vedolizumab by her gastroenterologist, Dr Renard Davey.  I was able to speak with him at length and he is concerned that the vedolizumab has not had a significant impact on her quality of life and that she has not been able to wean off of steroids and has required numerous hospitalizations.  Unfortunately she had stage III melanoma and was responsive to immunotherapy.  She has been cancer free.  However, due to concern that immunosuppression and biologic therapy, especially infliximab, could lead to melanoma recurrence, her options for medical therapy are limited.    She presents today via video with her father and his significant other.  They all agree that Doretha has really been suffering a lot and that her chronic abdominal pain and diarrhea are really limiting her life.   She seems quite miserable and is tearful in conversation when discussing this.    Her most recent colonoscopy was in 2021 by Dr Eddie Boudreaux.  Gunter Grade 3 (severe) inflammation was noted from the rectum proximal to the transverse colon.    Medical history:  Past Medical History:   Diagnosis Date     Abnormal MRI     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      Anxiety      Basal cell carcinoma      Cervical high risk HPV (human papillomavirus) test positive 2019    See problem list     Colitis      Depression      Diabetes mellitus, iatrogenic (H) 2020     Inflammatory arthritis      Insomnia      Intestinal giardiasis 3/5/2018     Lumbago     left lower back pain     Lymphedema      Malignant melanoma (H)      Melanoma (H) 10/23/2017     Mild persistent asthma      Obesity      STORMY (obstructive sleep apnea)      Prothrombin deficiency (H)     takes 81mg asa daily     Stroke (cerebrum) (H)     During      TIA (transient ischemic attack)      Type 2 diabetes mellitus (H)        Surgical history:  Past Surgical History:   Procedure Laterality Date      APPENDECTOMY       COLONOSCOPY N/A 10/18/2017    Procedure: COLONOSCOPY;  Colon;  Surgeon: Debbie Stephens MD;  [facility]     COLONOSCOPY N/A 3/9/2018    Procedure: COMBINED COLONOSCOPY, SINGLE OR MULTIPLE BIOPSY/POLYPECTOMY BY BIOPSY;  colon;  Surgeon: Benita Schumacher MD;  Location: [facility] GI     COLONOSCOPY      multiple since  to present - about 6 total     DISSECT LYMPH NODE AXILLA Left 10/23/2017    Procedure: DISSECT LYMPH NODE AXILLA;  Left Axillary Lymph Node Dissection ;  Surgeon: Laurent Cool MD;  Location: [facility] OR     EXAM UNDER ANESTHESIA PELVIC N/A 2020    Procedure: EXAM UNDER ANESTHESIA, PELVIS; with Cervical Biopsies, Vaginal Biopsy and Endocervical Curettings;  Surgeon: Melina Jung MD;  Location: [facility] OR     GYN SURGERY  ,          REPAIR MOHS Left 2017    Procedure: REPAIR MOHS;  Left Upper Lid Moh's Reconstruction;  Surgeon: Kisha Bosch MD;  Location: [facility]       Family history:  Family History   Problem Relation Age of Onset     Cancer Mother 45        lung     Neurologic Disorder Mother         epilepsy     Lipids Father      Gastrointestinal Disease Father         diverticulitis      Depression Father      Colitis Father      Colon Cancer Father      Diverticulitis Father      Cancer Maternal Grandmother      Blood Disease Maternal Grandmother         lymphoma      Arthritis Maternal Grandmother      Diabetes Maternal Grandmother      Depression Maternal Grandmother      Macular Degeneration Maternal Grandmother      Glaucoma Maternal Grandmother      Diabetes Maternal Grandfather      Cerebrovascular Disease Maternal Grandfather      Blood Disease Maternal Grandfather      Heart Disease Maternal Grandfather      Glaucoma Maternal Grandfather      Cancer Paternal Grandmother      Cancer - colorectal Paternal Grandmother      Colitis Paternal Grandmother      Colon Cancer Paternal Grandmother      Diverticulitis Paternal Grandmother      Respiratory  Paternal Grandfather         emphysema      Colitis Paternal Grandfather      Colon Cancer Paternal Grandfather      Diverticulitis Paternal Grandfather      Heart Disease Daughter      Asthma Daughter      Depression Sister      Melanoma No family hx of        Medications:  -prednisone  -enytvio    Allergies:  Allergies   Allergen Reactions     Bee Venom Swelling     Azithromycin Diarrhea     Erythromycin      Other reaction(s): GI intolerance, Vomiting     Fentanyl Other (See Comments)     sweating  sweating     Prochlorperazine Fatigue     Other reaction(s): Other (see comments)  Fatigue     Buspirone      Other reaction(s): GI intolerance  vomiting     Erythrocin Nausea and Vomiting     Zithromax [Azithromycin Dihydrate] Diarrhea     Enbrel [Etanercept] Hives and Rash       Social history:   Social History     Tobacco Use     Smoking status: Former Smoker     Packs/day: 1.00     Years: 5.00     Pack years: 5.00     Quit date: 3/20/1998     Years since quittin.2     Smokeless tobacco: Never Used   Substance Use Topics     Alcohol use: Yes     Comment: occ     Marital status: .    ROS:  A complete review of systems was performed with the patient and all systems negative except as per HPI.    Physical Examination:  Deferred as this was a video visit.      Laboratory values reviewed:  Recent Labs   Lab Test 06/15/21  0926 21  0626 21  0750 21  0750 21  1356 21  1356   WBC  --  16.1*   < > 9.0   < > 28.3*   HGB 12.9 12.7   < > 12.3   < > 14.0   PLT  --  254   < > 225   < > 308   CR  --  0.70   < > 0.72   < > 0.66   ALBUMIN  --  3.1*  --  3.2*   < > 3.4   BILITOTAL  --   --   --  0.3   < > 0.2   ALKPHOS  --   --   --  54   < > 52   ALT  --   --   --  42   < > 91*   AST  --   --   --  12   < > 21   INR  --   --   --   --   --  0.96    < > = values in this interval not displayed.       Imaging personally reviewed by me:  CT abd/pelvis 6/3/2021  Prominent adnexal structures  which may indicate ovarian  follicles, consider pelvic ultrasound. Redundant sigmoid colon with  diverticulosis. No evidence of inflammation. Thoracolumbar  spondylosis. Extensive fatty atrophy of the distal body as well as  tail portions of the pancreas.    ASSESSMENT  47 y/o lady with ulcerative colitis, non-responsive to medical management and steroid dependent.    Risks, benefits, and alternatives of operative treatment were thoroughly discussed with the patient, he/she understands these well and agrees to proceed.    PLAN  1. OR on Tuesday, Valeri 15, 2021 for laparoscopic total abdominal colectomy and end ileostomy  2. WOC RN marking in preop  3. Extensive discussion with her on the risks and benefits of surgery, expected post op recovery, and timing if/when ileal pouch creation would take place; this is contingent upon her loosing weight after colectomy  4. Please see my note from her inpatient hospitalization for more details of our discussion  5.  She voiced understanding and she and her family agreed to proceed with surgery      Video-Visit Details    Type of service:  Video Visit    Video End Time (time video stopped): 12:30 pm    Originating Location (pt. Location): Home    Distant Location (provider location):  [facility] [place]     Mode of Communication:  Video Conference via [employer]    60 minutes spent on the date of the encounter doing chart review, history, review of imaging, documentation ,and further activities as noted above, which includes my time spent on video with the patient/family.         Quinton Ram MD, PhD    Division of Colon and Rectal Surgery  [facility]    Referring Provider:  No referring provider defined for this encounter.     Primary Care Provider:  Anna Naidu

## 2021-06-15 NOTE — ANESTHESIA PROCEDURE NOTES
TAP Procedure Note  Pre-Procedure   Staff -        Anesthesiologist:  Howard Fields DO       Performed By: anesthesiologist       Location: pre-op       Procedure Start/Stop Times: 6/15/2021 9:38 AM and 6/15/2021 9:48 AM       Pre-Anesthestic Checklist: patient identified, IV checked, site marked, risks and benefits discussed, informed consent, monitors and equipment checked, pre-op evaluation, at physician/surgeon's request and post-op pain management  Timeout:       Correct Patient: Yes        Correct Procedure: Yes        Correct Site: Yes        Correct Position: Yes        Correct Laterality: Yes        Site Marked: Yes  Procedure Documentation  Procedure: TAP       Diagnosis: POST OPERATIVE PAIN       Laterality: bilateral       Patient Position: supine       Patient Prep/Sterile Barriers: sterile gloves, mask       Skin prep: Chloraprep       Needle Type: short bevel       Needle Gauge: 21.        Needle Length (millimeters): 110        Ultrasound guided       1. Ultrasound was used to identify targeted nerve, plexus, vascular marker, or fascial plane and place a needle adjacent to it in real-time.       2. Ultrasound was used to visualize the spread of anesthetic in close proximity to the above referenced structure.       3. A permanent image is entered into the patient's record.       4. The visualized anatomic structures appeared normal.       5. There were no apparent abnormal pathologic findings.    Assessment/Narrative         The placement was negative for: blood aspirated, painful injection and site bleeding       Paresthesias: No.     Bolus given via needle..        Secured via.        Insertion/Infusion Method: Single Shot       Complications: none       Injection made incrementally with aspirations every 5 mL.    Medication(s) Administered   Bupivacaine 0.25% PF (Infiltration), 20 mL  Bupivacaine liposome (Exparel) 1.3% LA inj susp (Infiltration), 20 mL  Medication Administration Time:  6/15/2021 9:46 AM    Comments:  Informed consent obtained.  All risks and benefits of the nerve block discussed with the patient.  All questions answered and all parties agreed with the plan.   Block was placed at the surgeon's request for post operative pain control.

## 2021-06-15 NOTE — ANESTHESIA PROCEDURE NOTES
Airway      Staff -        Anesthesiologist:  Jony Watts MD       Resident/Fellow: Laina Cornell MD       Performed By: resident and with residents       Procedure performed by resident/fellow/CRNA in presence of a teaching physician.    Consent for Airway        Urgency: elective  Indications and Patient Condition       Indications for airway management: toy-procedural       Induction type:intravenous       Mask difficulty assessment: 2 - vent by mask + OA or adjuvant +/- NMBA    Final Airway Details       Final airway type: endotracheal airway       Successful airway: ETT - single  Endotracheal Airway Details        ETT size (mm): 7.0       Cuffed: yes       Successful intubation technique: video laryngoscopy and flexible bronchoscopy (h/o difficult VL view of glottis. )       VL Blade Size: Glidescope 3       Grade View of Cords: 1       Adjucts: stylet       Position: Right       Measured from: gums/teeth       Secured at (cm): 21       Bite block used: Soft    Post intubation assessment        Placement verified by: capnometry and chest rise        Number of attempts at approach: 1       Number of other approaches attempted: 0       Secured with: pink tape       Ease of procedure: easy       Dentition: Intact and Unchanged    Medication(s) Administered   Medication Administration Time: 6/15/2021 10:43 AM

## 2021-06-15 NOTE — PROGRESS NOTES
Action 06/15/2021 JTV 4:12PM   Action Taken SHIRLEY received Pathology slides from [facility]. [facility] sent the slides to Pathology on 5th floor for processing.

## 2021-06-15 NOTE — ANESTHESIA CARE TRANSFER NOTE
Patient: Doretha Fernandez    Procedure(s):  laparoscopic total abdominal colectomy, end ileostomy    Diagnosis: Ulcerative colitis without complications, unspecified location (H) [K51.90]  Diagnosis Additional Information: No value filed.    Anesthesia Type:   General     Note:    Oropharynx: oropharynx clear of all foreign objects  Level of Consciousness: awake      Independent Airway: airway patency satisfactory and stable  Dentition: dentition unchanged  Vital Signs Stable: post-procedure vital signs reviewed and stable  Report to RN Given: handoff report given  Patient transferred to: PACU    Handoff Report: Identifed the Patient, Identified the Reponsible Provider, Reviewed the pertinent medical history, Discussed the surgical course, Reviewed Intra-OP anesthesia mangement and issues during anesthesia, Set expectations for post-procedure period and Allowed opportunity for questions and acknowledgement of understanding      Vitals: (Last set prior to Anesthesia Care Transfer)  CRNA VITALS  6/15/2021 1526 - 6/15/2021 1610      6/15/2021             Pulse:  91    SpO2:  98 %        Electronically Signed By: AMADA Petersen CRNA  Valeri 15, 2021  4:10 PM

## 2021-06-15 NOTE — ANESTHESIA POSTPROCEDURE EVALUATION
Patient: Doretha Fernandez    Procedure(s):  laparoscopic total abdominal colectomy, end ileostomy    Diagnosis:Ulcerative colitis without complications, unspecified location (H) [K51.90]  Diagnosis Additional Information: No value filed.    Anesthesia Type:  General    Note:  Disposition: Inpatient   Postop Pain Control: Uneventful            Sign Out: Well controlled pain   PONV: No   Neuro/Psych: Uneventful            Sign Out: Acceptable/Baseline neuro status   Airway/Respiratory: Uneventful            Sign Out: Acceptable/Baseline resp. status   CV/Hemodynamics: Uneventful            Sign Out: Acceptable CV status; No obvious hypovolemia; No obvious fluid overload   Other NRE: NONE   DID A NON-ROUTINE EVENT OCCUR? No           Last vitals:  Vitals:    06/15/21 1615 06/15/21 1630 06/15/21 1645   BP: 116/68 102/65 106/62   Pulse: 93 90 89   Resp: 15 16 14   Temp:      SpO2: 98% 94% 97%       Last vitals prior to Anesthesia Care Transfer:  CRNA VITALS  6/15/2021 1526 - 6/15/2021 1626      6/15/2021             Pulse:  91    SpO2:  98 %          Electronically Signed By: Kris Crawley MD  Valeri 15, 2021  5:13 PM

## 2021-06-15 NOTE — OR NURSING
TAP block performed by Dr Fields. Patientt tolerated well. Patient received 2 mg of versed. VSS. Patient denies any adverse signs or symptoms. See flow sheet/MAR for monitoring.

## 2021-06-15 NOTE — CONSULTS
WOC Preoperative Ostomy    Diagnosis: UC with overlying C diff and possible immunotherapy colitis (h/o melanoma)  Date of Surgery: 6/15/2021  Type of Surgery: end ileosotmy  Emotional readiness for surgery: tearful and nervous  Physical Limitations: Without limitations  Abdomen assessed for site by: Patient's ability to visiualize area, avoidance of skin creases and scars, palpating for rectus abdominus muscle and clothing fit  Teaching: written/media offered, ostomy volunteer offered and role of WOC/postop followup explained, did not complete entire teaching as pt was tearful.  Stoma site marking: Method used: permanent marker and covered with tegaderm  Location chosen: RUQ and LUQ    Plan to follow up post surgery for ostomy teaching.

## 2021-06-15 NOTE — PROGRESS NOTES
"POST OP CHECK     S: Currently complaining of pain and \"just wants it to go away,\" but feels ok with lying still. Worried about having a stoma and what life will be like, but thinks it will be better without her colitis pain. Emotional about changing the bag and needed reassurance. Otherwise no complaints. Denies nausea, vomiting, chest pain, sob or other concerns.     O:   /71   Pulse (P) 87   Temp (P) 97.8  F (36.6  C) (Oral)   Resp 12   SpO2 (P) 98%     General: awake, alert, lying flat in bed, NAD  CV: Non-cyanotic, Peripheral pulses intact  Resp: Nonlabored breathing on NC  Abd: soft, appropriately tender, without guarding or rebound tenderness, incisions c/d/i with Dermabond, stoma beefy red and healthy, bowel sweat in the ostomy  : lacy with clear yellow urine  Ext: wwp. No edema. No calf tenderness. SCDs in place.      A/P: Dorteha Fernandez is a 46 year old female with history of ulcerative colitis and melanoma now POD0 from laparoscopic total abdominal colectomy with end ileostomy.     No acute concerns postop  Scheduled tylenol, PRN dilaudid  Lacy  LR @ 75  PRN zofran   Stress dose steroids    Remainder of cares per primary team.     Nkechi Pitts MD  PGY-1 Surgery          "

## 2021-06-15 NOTE — OP NOTE
"PREOPERATIVE DIAGNOSES:   1.  Ulcerative colitis  2. History of melanoma     POSTOPERATIVE DIAGNOSES:   1.  Ulcerative colitis  2. History of melanoma     ANESTHESIA:  General endotracheal anesthesia      PROCEDURE PROPOSED:  1.  Laparoscopic total abdominal colectomy with end ileostomy.    PROCEDURES PERFORMED:   1.  Laparoscopic total abdominal colectomy with end ileostomy.    Surgeon: Dr. Ram     ASSISTANT:  Lowell Gould MD CRS Fellow     HISTORY OF PRESENT ILLNESS:  Ms. Fernandez is a 46-year-old female who has known ulcerative colitis. She also has a history of melanoma. Her disease has not be optimally controlled using her current medication regimen, and she was presented with the options of either biologic medications, or surgery. Given her melanoma, she opted to undergo colectomy, rather than take long-term immunomodulating medications that may precipitate a recurrence of her cancer. A long discussion was had regarding this choice. Also raised was the fact this end ileostomy may be permanent, and that given her body habitus a ileal pouch anal anastomosis may not be feasible.     DESCRIPTION OF PROCEDURE:  After obtaining informed consent, the patient was brought to the operating room and placed in the modified lithotomy position.  General endotracheal anesthesia was gently induced without difficulty.  She also received appropriate preoperative antibiotic prophylaxis as well as mechanical and chemical DVT prophylaxis.  Bilateral lower extremity pneumatic compression devices were applied, and all pressure points were cushioned.  A Major catheter was inserted.  The abdomen was prepped and draped in the standard sterile fashion.  A \"timeout\" was performed.      A veress needle was placed in the left upper quadrant without difficulty and pneumoperitnoeum was intiated with CO2 gas. A 5mm optiview trocar was placed in the LUQ. Five additional ports were placed through out the case, 5mm ports in the RUQ, RLQ, " LLQ, and periumbilical region, and a 10mm port at the site of the future ileostomy. These were all placed under direct visualization and without difficulty.  The patient was then positioned and the small bowel was retracted to the right upper abdomen.  The sigmoid colon was mobilized using a lateral to medial approach. This dissection was then carried up to the splenic flexure as well as to the proximal rectum. We mobilized the omentum off the transverse colon and entered the lesser sac. We carried this dissection to the hepatic flexure and splenic flexure. We then completed our splenic flexure mobilization. Next we mobilized our right colon using a lateral to medial approach. We identified the duodenum without difficulty. The hepatic flexure was quite redundant, but was mobilized with ease.    We then proceed to mobilize our terminal ileum along the mesentery and retroperitoneal interface. After this was mobilized enough to get our stoma to the ostomy location, we divided the terminal ileum using an Endo-ALISIA stapler. A ligasure was then used to divide the right colon mesentery. We then made a window at our recto-sigmoid junction and divided our colon using another firing of the Endo-Alisia, and similarly took our left colon mesentery using the ligasure. With the right colon and left colon mobilized and mesentery divided, we turned our attention to the transverse mesocolon on our already mobilized transverse colon. We proceeded close to the colon itself, using the ligasure to dived the mesentery and avoid small bowel and small bowel vascular structures.    A circular incision was made in the RUQ and we divided the fascia in a linear fashion. We then expressed our specimen through this incision and followed that with the transected ileum. Our skin incision were closed using a 4-0 Monocryl and skin glue.     An end ileostomy was matured in the RUQ using 3-0 chromic stitches to Nahed the edges. We insured there was no  twist as we expressed it through the abdominal wall.     The patient tolerated the procedure well and was transferred to the PACU in stable condition.    Lowell Gould MD PhD  CRS fellow

## 2021-06-15 NOTE — OR NURSING
Patient came to PACU on insulin drip, ok to shut off drip per Dr. Crawley, blood sugar stable at 137. Patient having severe pain and anxiety, gave 1 mg dialudid, per patient no change in pain after medication. Verbal order from Dr. Crawley to give 1-2 mg IV ativan. Gave 1 mg IV ativan, good results- patient resting comfortably. Will continue to monitor.

## 2021-06-16 LAB
ANION GAP SERPL CALCULATED.3IONS-SCNC: 6 MMOL/L (ref 3–14)
BUN SERPL-MCNC: 8 MG/DL (ref 7–30)
CALCIUM SERPL-MCNC: 7.6 MG/DL (ref 8.5–10.1)
CHLORIDE SERPL-SCNC: 105 MMOL/L (ref 94–109)
CO2 SERPL-SCNC: 29 MMOL/L (ref 20–32)
CREAT SERPL-MCNC: 0.67 MG/DL (ref 0.52–1.04)
ERYTHROCYTE [DISTWIDTH] IN BLOOD BY AUTOMATED COUNT: 13.8 % (ref 10–15)
GFR SERPL CREATININE-BSD FRML MDRD: >90 ML/MIN/{1.73_M2}
GLUCOSE BLDC GLUCOMTR-MCNC: 145 MG/DL (ref 70–99)
GLUCOSE BLDC GLUCOMTR-MCNC: 161 MG/DL (ref 70–99)
GLUCOSE BLDC GLUCOMTR-MCNC: 164 MG/DL (ref 70–99)
GLUCOSE BLDC GLUCOMTR-MCNC: 209 MG/DL (ref 70–99)
GLUCOSE SERPL-MCNC: 145 MG/DL (ref 70–99)
HBA1C MFR BLD: 7.5 % (ref 0–5.6)
HCT VFR BLD AUTO: 35.5 % (ref 35–47)
HGB BLD-MCNC: 11.7 G/DL (ref 11.7–15.7)
MCH RBC QN AUTO: 31.2 PG (ref 26.5–33)
MCHC RBC AUTO-ENTMCNC: 33 G/DL (ref 31.5–36.5)
MCV RBC AUTO: 95 FL (ref 78–100)
PLATELET # BLD AUTO: 201 10E9/L (ref 150–450)
POTASSIUM SERPL-SCNC: 4.6 MMOL/L (ref 3.4–5.3)
RBC # BLD AUTO: 3.75 10E12/L (ref 3.8–5.2)
SODIUM SERPL-SCNC: 140 MMOL/L (ref 133–144)
WBC # BLD AUTO: 14.3 10E9/L (ref 4–11)

## 2021-06-16 PROCEDURE — 80048 BASIC METABOLIC PNL TOTAL CA: CPT | Performed by: STUDENT IN AN ORGANIZED HEALTH CARE EDUCATION/TRAINING PROGRAM

## 2021-06-16 PROCEDURE — 258N000003 HC RX IP 258 OP 636: Performed by: STUDENT IN AN ORGANIZED HEALTH CARE EDUCATION/TRAINING PROGRAM

## 2021-06-16 PROCEDURE — 999N000199 HC STATISTIC WOC PT EDUCATION, 31-45 MIN

## 2021-06-16 PROCEDURE — 250N000013 HC RX MED GY IP 250 OP 250 PS 637: Performed by: STUDENT IN AN ORGANIZED HEALTH CARE EDUCATION/TRAINING PROGRAM

## 2021-06-16 PROCEDURE — 85027 COMPLETE CBC AUTOMATED: CPT | Performed by: STUDENT IN AN ORGANIZED HEALTH CARE EDUCATION/TRAINING PROGRAM

## 2021-06-16 PROCEDURE — 250N000011 HC RX IP 250 OP 636: Performed by: STUDENT IN AN ORGANIZED HEALTH CARE EDUCATION/TRAINING PROGRAM

## 2021-06-16 PROCEDURE — 250N000012 HC RX MED GY IP 250 OP 636 PS 637: Performed by: PHYSICIAN ASSISTANT

## 2021-06-16 PROCEDURE — 999N001017 HC STATISTIC GLUCOSE BY METER IP

## 2021-06-16 PROCEDURE — 250N000013 HC RX MED GY IP 250 OP 250 PS 637: Performed by: PHYSICIAN ASSISTANT

## 2021-06-16 PROCEDURE — 120N000002 HC R&B MED SURG/OB UMMC

## 2021-06-16 PROCEDURE — 250N000011 HC RX IP 250 OP 636: Performed by: PHYSICIAN ASSISTANT

## 2021-06-16 PROCEDURE — 88321 CONSLTJ&REPRT SLD PREP ELSWR: CPT | Mod: GC | Performed by: PATHOLOGY

## 2021-06-16 PROCEDURE — 36415 COLL VENOUS BLD VENIPUNCTURE: CPT | Performed by: STUDENT IN AN ORGANIZED HEALTH CARE EDUCATION/TRAINING PROGRAM

## 2021-06-16 PROCEDURE — 83036 HEMOGLOBIN GLYCOSYLATED A1C: CPT | Performed by: STUDENT IN AN ORGANIZED HEALTH CARE EDUCATION/TRAINING PROGRAM

## 2021-06-16 PROCEDURE — 999N001032 HC STATISTIC REVIEW OUTSIDE SLIDES TC 88321: Performed by: SURGERY

## 2021-06-16 PROCEDURE — G0463 HOSPITAL OUTPT CLINIC VISIT: HCPCS

## 2021-06-16 RX ORDER — HYDROMORPHONE HYDROCHLORIDE 1 MG/ML
0.3 INJECTION, SOLUTION INTRAMUSCULAR; INTRAVENOUS; SUBCUTANEOUS
Status: COMPLETED | OUTPATIENT
Start: 2021-06-16 | End: 2021-06-16

## 2021-06-16 RX ORDER — NICOTINE POLACRILEX 4 MG
15-30 LOZENGE BUCCAL
Status: DISCONTINUED | OUTPATIENT
Start: 2021-06-16 | End: 2021-06-19 | Stop reason: HOSPADM

## 2021-06-16 RX ORDER — ACETAMINOPHEN 325 MG/1
975 TABLET ORAL EVERY 8 HOURS
Status: DISCONTINUED | OUTPATIENT
Start: 2021-06-16 | End: 2021-06-19 | Stop reason: HOSPADM

## 2021-06-16 RX ORDER — HYDROXYZINE HYDROCHLORIDE 25 MG/1
25 TABLET, FILM COATED ORAL EVERY 6 HOURS PRN
Status: DISCONTINUED | OUTPATIENT
Start: 2021-06-16 | End: 2021-06-18

## 2021-06-16 RX ORDER — DEXTROSE MONOHYDRATE 25 G/50ML
25-50 INJECTION, SOLUTION INTRAVENOUS
Status: DISCONTINUED | OUTPATIENT
Start: 2021-06-16 | End: 2021-06-19 | Stop reason: HOSPADM

## 2021-06-16 RX ORDER — PREDNISONE 20 MG/1
20 TABLET ORAL DAILY
Status: DISCONTINUED | OUTPATIENT
Start: 2021-06-18 | End: 2021-06-19 | Stop reason: HOSPADM

## 2021-06-16 RX ORDER — SIMETHICONE 80 MG
80-160 TABLET,CHEWABLE ORAL 4 TIMES DAILY PRN
Status: DISCONTINUED | OUTPATIENT
Start: 2021-06-16 | End: 2021-06-19 | Stop reason: HOSPADM

## 2021-06-16 RX ORDER — PREDNISONE 20 MG/1
40 TABLET ORAL DAILY
Status: DISCONTINUED | OUTPATIENT
Start: 2021-06-17 | End: 2021-06-16

## 2021-06-16 RX ADMIN — HYDROMORPHONE HYDROCHLORIDE 0.4 MG: 0.2 INJECTION, SOLUTION INTRAMUSCULAR; INTRAVENOUS; SUBCUTANEOUS at 00:02

## 2021-06-16 RX ADMIN — HYDROMORPHONE HYDROCHLORIDE 0.4 MG: 0.2 INJECTION, SOLUTION INTRAMUSCULAR; INTRAVENOUS; SUBCUTANEOUS at 09:00

## 2021-06-16 RX ADMIN — SODIUM CHLORIDE, POTASSIUM CHLORIDE, SODIUM LACTATE AND CALCIUM CHLORIDE: 600; 310; 30; 20 INJECTION, SOLUTION INTRAVENOUS at 22:07

## 2021-06-16 RX ADMIN — VENLAFAXINE HYDROCHLORIDE 225 MG: 225 TABLET, EXTENDED RELEASE ORAL at 08:12

## 2021-06-16 RX ADMIN — OXYCODONE HYDROCHLORIDE 10 MG: 5 TABLET ORAL at 14:02

## 2021-06-16 RX ADMIN — ONDANSETRON 4 MG: 4 TABLET, ORALLY DISINTEGRATING ORAL at 00:20

## 2021-06-16 RX ADMIN — ENOXAPARIN SODIUM 40 MG: 40 INJECTION SUBCUTANEOUS at 09:58

## 2021-06-16 RX ADMIN — HYDROMORPHONE HYDROCHLORIDE 0.4 MG: 0.2 INJECTION, SOLUTION INTRAMUSCULAR; INTRAVENOUS; SUBCUTANEOUS at 11:58

## 2021-06-16 RX ADMIN — HYDROCORTISONE SODIUM SUCCINATE 50 MG: 100 INJECTION, POWDER, FOR SOLUTION INTRAMUSCULAR; INTRAVENOUS at 11:59

## 2021-06-16 RX ADMIN — HYDROMORPHONE HYDROCHLORIDE 0.4 MG: 0.2 INJECTION, SOLUTION INTRAMUSCULAR; INTRAVENOUS; SUBCUTANEOUS at 03:14

## 2021-06-16 RX ADMIN — SIMETHICONE 160 MG: 80 TABLET, CHEWABLE ORAL at 09:56

## 2021-06-16 RX ADMIN — SODIUM CHLORIDE, POTASSIUM CHLORIDE, SODIUM LACTATE AND CALCIUM CHLORIDE: 600; 310; 30; 20 INJECTION, SOLUTION INTRAVENOUS at 10:34

## 2021-06-16 RX ADMIN — ACETAMINOPHEN 975 MG: 325 TABLET, FILM COATED ORAL at 11:59

## 2021-06-16 RX ADMIN — HYDROMORPHONE HYDROCHLORIDE 0.3 MG: 1 INJECTION, SOLUTION INTRAMUSCULAR; INTRAVENOUS; SUBCUTANEOUS at 09:57

## 2021-06-16 RX ADMIN — HYDROCORTISONE SODIUM SUCCINATE 50 MG: 100 INJECTION, POWDER, FOR SOLUTION INTRAMUSCULAR; INTRAVENOUS at 19:11

## 2021-06-16 RX ADMIN — INSULIN ASPART 2 UNITS: 100 INJECTION, SOLUTION INTRAVENOUS; SUBCUTANEOUS at 17:14

## 2021-06-16 RX ADMIN — HYDROMORPHONE HYDROCHLORIDE 0.2 MG: 0.2 INJECTION, SOLUTION INTRAMUSCULAR; INTRAVENOUS; SUBCUTANEOUS at 05:58

## 2021-06-16 RX ADMIN — ACETAMINOPHEN 975 MG: 325 TABLET, FILM COATED ORAL at 19:07

## 2021-06-16 RX ADMIN — SIMETHICONE 160 MG: 80 TABLET, CHEWABLE ORAL at 19:19

## 2021-06-16 RX ADMIN — HYDROCORTISONE SODIUM SUCCINATE 50 MG: 100 INJECTION, POWDER, FOR SOLUTION INTRAMUSCULAR; INTRAVENOUS at 03:11

## 2021-06-16 RX ADMIN — HYDROMORPHONE HYDROCHLORIDE 0.4 MG: 0.2 INJECTION, SOLUTION INTRAMUSCULAR; INTRAVENOUS; SUBCUTANEOUS at 18:21

## 2021-06-16 RX ADMIN — HYDROXYZINE HYDROCHLORIDE 25 MG: 25 TABLET, FILM COATED ORAL at 09:21

## 2021-06-16 RX ADMIN — HYDROMORPHONE HYDROCHLORIDE 0.4 MG: 0.2 INJECTION, SOLUTION INTRAMUSCULAR; INTRAVENOUS; SUBCUTANEOUS at 15:57

## 2021-06-16 RX ADMIN — HYDROXYZINE HYDROCHLORIDE 25 MG: 25 TABLET, FILM COATED ORAL at 22:00

## 2021-06-16 RX ADMIN — ENOXAPARIN SODIUM 40 MG: 40 INJECTION SUBCUTANEOUS at 22:00

## 2021-06-16 RX ADMIN — OXYCODONE HYDROCHLORIDE 5 MG: 5 TABLET ORAL at 08:10

## 2021-06-16 RX ADMIN — SIMETHICONE 160 MG: 80 TABLET, CHEWABLE ORAL at 14:02

## 2021-06-16 RX ADMIN — ONDANSETRON 4 MG: 2 INJECTION INTRAMUSCULAR; INTRAVENOUS at 17:06

## 2021-06-16 RX ADMIN — HYDROXYZINE HYDROCHLORIDE 25 MG: 25 TABLET, FILM COATED ORAL at 15:43

## 2021-06-16 RX ADMIN — HYDROMORPHONE HYDROCHLORIDE 0.4 MG: 0.2 INJECTION, SOLUTION INTRAMUSCULAR; INTRAVENOUS; SUBCUTANEOUS at 20:32

## 2021-06-16 RX ADMIN — ACETAMINOPHEN 975 MG: 325 TABLET, FILM COATED ORAL at 03:10

## 2021-06-16 RX ADMIN — AMLODIPINE BESYLATE 5 MG: 5 TABLET ORAL at 08:11

## 2021-06-16 RX ADMIN — ROSUVASTATIN CALCIUM 10 MG: 10 TABLET, FILM COATED ORAL at 08:11

## 2021-06-16 RX ADMIN — OXYCODONE HYDROCHLORIDE 10 MG: 5 TABLET ORAL at 19:07

## 2021-06-16 RX ADMIN — HYDROMORPHONE HYDROCHLORIDE 0.4 MG: 0.2 INJECTION, SOLUTION INTRAMUSCULAR; INTRAVENOUS; SUBCUTANEOUS at 22:29

## 2021-06-16 ASSESSMENT — ACTIVITIES OF DAILY LIVING (ADL)
ADLS_ACUITY_SCORE: 13
ADLS_ACUITY_SCORE: 14

## 2021-06-16 NOTE — PROGRESS NOTES
Colorectal Surgery Progress Note    Subjective: No acute events. Afebrile, vitals stable. Pain controlled. Stoma with small amount of thick stool in bag. Has not ambulated since surgery.     Objective:   /64 (BP Location: Right arm)   Pulse 76   Temp 97.8  F (36.6  C) (Oral)   Resp 12   SpO2 92%     PE:  Gen: lying in bed, anxious about postop course/stoma  CV: Regular rate  Resp: non-labored breathing on 1.5L NC  Abd: Soft, non-tender, non-distended, ileostomy pink patent with small volume thick stool in bag  Ext: warm and well perfused  Incision: lap sites c/d/i      Intake/Output Summary (Last 24 hours) at 6/16/2021 0631  Last data filed at 6/16/2021 0407  Gross per 24 hour   Intake 2267.5 ml   Output 1465 ml   Net 802.5 ml       Labs pending    A/P: 46F hx medically refractory UC s/p TAC w/ EI 6/15. Progressing appropriately in the postoperative period.     - Low fiber diet; educated pt on going slow  - IV fluids  - Stress dose steroids. Outpt steroid taper according to previous notes (Continue prednisone taper to include 60 mg for a total of 1 week then 50/40/30/25/20/15/10/5 milligrams daily for 1 week each then 5 mg every other day for 1 week until off)  - Pain control  - Remove Major    Seen and discussed with fellow who will discuss with staff.    Poncho Castellanos MD  Surgery PGY-1

## 2021-06-16 NOTE — PROGRESS NOTES
Admitted/transferred from: PACU  2 RN full   skin assessment completed by Pamella White, TEJINDER and Nichole REDMOND RN.  Skin assessment finding: issues found bruising on L forearm by IV site, 4 abdominal lap sites   Interventions/actions: other none at this time     Will continue to monitor.

## 2021-06-16 NOTE — CONSULTS
"  WO Nurse Inpatient [facility]   WO Nurse Inpatient Adult     Initial Assessment   Assessment of new end Ileostomy Stoma complication(s) none   Mucocutaneous junction; intact   Peristomal complication(s) none   Pouch wear time:less than 24 hours  Following today's visit:Patient  is  able to demonstrate;       1. How to empty their pouch? No, reviewed/ demonstrated and asked to practice with nursing.      2. How to change their pouch?  No, patient observed pouch change with step by step directions.       3. How to read and record intake and output correctly? No, demonstrated how to measure output, asked to practice with nurses.     Objective data:  Patient history according to medical record: 46F hx medically refractory UC s/p TAC w/ EI 6/15. Progressing appropriately in the postoperative period.   Current Diet/Nutrition: Orders Placed This Encounter      Low Fiber Diet     TPN no   I/O last 3 completed shifts:  In: 2287.5 [P.O.:600; I.V.:1687.5]  Out: 1705 [Urine:1655; Blood:50]  Labs:    Recent Labs   Lab 06/16/21  0811   HGB 11.7   WBC 14.3*   A1C 7.5*        Physical Exam:  Current pouching system:Keith 2 piece 70  Reason for pouch change today: ostomy education  Stoma appearance: healthy, pink and round  Stoma size; 1 1/4\"   Peristomal skin: intact  Stoma output :brown and pasty   Abdominal  Assessment  distended , NG still in place? No  Surgical Site: open to air  Pain: Dull ache  Is patient still on a PCA No    Interventions:  Patient's chart evaluated.  Focus of today's visit: initial fitting, pouch change demonstration , verbal instruction , pouch emptying and output measurement   Participant of teaching session today patient   Orders: Written  Change made with ostomy management today: Yes- Adapt ring, 57 flat two piece with fecal pouch.  Patient/family: observing  Supplies:Gathered and at bedside    Plan:  Learning needs: refitting of appliance, pouch change return demonstration, verbal " instruction , diet and hydration , output measurement, odor/flatus management, lifestyle adjustments and discharge instructions  Preparation for discharge: Registered for samples from   Recommend home care? yes and by home WOC nurse if possible    Discussed plan of care with Patient  Nursing to notify the Provider(s) and re-consult the WOC Nurse if new ostomy concerns or discharge planned before next planned WOC visit.    WOC Nurse will return: Daily M-F  Face to face time: 45 minutes    Stephy South RN, CWOCN

## 2021-06-16 NOTE — PLAN OF CARE
POD 0 laparoscopic total abdominal colectomy, end ileostomy. 3 L NC, OAVSS. A&O x 4. Arrived to the floor around 1900, was in a lot of pain. Oxycodone and IV Dilaudid PRN for pain. 2 RN skin assessment completed. 4 abdominal lap sites, liquid bandage, YADIRA. R wrist PIV SL. R forearm PIV infusing LR at 75 mL/hr. Major, patent with adequate output. New ileostomy, small reddish output. Clear liquids, tolerating well. Did not dangle at bedside due to pain.

## 2021-06-16 NOTE — PLAN OF CARE
Problem: Adult Inpatient Plan of Care  Goal: Absence of Hospital-Acquired Illness or Injury  Intervention: Prevent Skin Injury  Recent Flowsheet Documentation  Taken 6/16/2021 1244 by Desi Travis RN  Body Position: position changed independently  Taken 6/16/2021 0800 by Desi Travis RN  Body Position: position changed independently    Patient POD1 from ileostomy creation s/p ulcerative colitis s/p opdivo s/p malignant melanoma. Ostomy stooling, lap sites x 4 WNL.  Encouraging activity/OOB to prevent skin injury. Patient out of bed x 1 on shift, sitting in chair.     Problem: Adult Inpatient Plan of Care  Goal: Absence of Hospital-Acquired Illness or Injury  Intervention: Identify and Manage Fall Risk  Recent Flowsheet Documentation  Taken 6/16/2021 0800 by Desi Travis RN  Safety Promotion/Fall Prevention:    activity supervised    increased rounding and observation    increase visualization of patient    nonskid shoes/slippers when out of bed    patient and family education    room near nurse's station    Problem: Pain (Ileostomy)  Goal: Acceptable Pain Control  Outcome: Improving    Patient is POD 1 and very anxious to get out of bed. Encouraging patient to move; patient states she will get out of bed when pain better controlled. Educated her on importance of getting out of bed to HELP with pain control; patient eventually agreed, and admitted that pain was better controlled when standing or sitting. Patient initially out of bed with assistance of 2; once out of bed and ambulating, patient steady and assistance of standby to one.      Problem: Postoperative Stoma Care (Ileostomy)  Goal: Optimal Stoma Healing  Outcome: Improving     Patient POD1 from ileostomy creation; patient stooling, WOC RN changed bag today. Educating patient on ostomy cares.     AOVSS, /58 (BP Location: Right arm)   Pulse 83   Temp 97.9  F (36.6  C) (Oral)   Resp 18   SpO2 96% . Pain best controlled with  simethicone and activity; second best controlled with vistaril; IV dilaudid and oral oxycodone did not decrease patient's pain today.

## 2021-06-16 NOTE — PLAN OF CARE
Nasal canula at 2L, other VSS stable.Patient is alert and oriented, can be very anxious. Slept through most of the night. Ileostomy output changed from pink to soft,brown stool; passing flatus. Lap sites intact. Reports high pain levels (7-9). Pain managed with Tylenol and  IV Dilaudid. Reported nausea; given Zofran with releif. Right forearm PIV infusing LR. Urine output from catheter is yellow and adequate. Dangled at bedside.Clear liquid diet. Demonstrated incentive spirometry use. Continue with care of plan.

## 2021-06-16 NOTE — CONSULTS
Care Management Initial Consult    General Information  Assessment completed with: Patient, (Patient)  Type of CM/SW Visit: Offer D/C Planning    Primary Care Provider verified and updated as needed: Yes   Readmission within the last 30 days:        Reason for Consult: discharge planning  Advance Care Planning:       General Information Comments: (Home RN for post op support is okay with patient.)    Communication Assessment  Patient's communication style: (Speaks English)    Hearing Difficulty or Deaf: no   Wear Glasses or Blind: yes    Cognitive  Cognitive/Neuro/Behavioral: .WDL except  Level of Consciousness: alert  Arousal Level: opens eyes spontaneously  Orientation: oriented x 4  Mood/Behavior: anxious, agitated  Best Language: 0 - No aphasia  Speech: clear, spontaneous, logical    Living Environment:   People in home: child(gold), dependent     Current living Arrangements:        Able to return to prior arrangements:         Current Resources:   Patient receiving home care services:  Non prior to admission.     Community Resources:    Equipment currently used at home: none  Supplies currently used at home:      Employment/Financial:  Employment Status:          Financial Concerns:   Non discussed          Lifestyle & Psychosocial Needs:  (From Chart Flow Sheet)        Socioeconomic History     Marital status:      Spouse name: Not on file     Number of children: Not on file     Years of education: Not on file     Highest education level: Not on file     Tobacco Use     Smoking status: Former Smoker     Packs/day: 1.00     Years: 5.00     Pack years: 5.00     Quit date: 3/20/1998     Years since quittin.2     Smokeless tobacco: Never Used   Substance and Sexual Activity     Alcohol use: Yes     Comment: occ     Drug use: No     Sexual activity: Not Currently     Partners: Male     Birth control/protection: Surgical       Functional Status:  Prior to admission patient needed assistance:   independent      Mental Health Status:  Not assessed.          Chemical Dependency Status:  See above.              Values/Beliefs:  Spiritual, Cultural Beliefs, Druze Practices, Values that affect care: no               Additional Information:    Please fax discharge orders to [facility], [place]     Ph:  [phone]     Fax: [phone]     Skilled RN for initial home safety evaluation and 1-3 times a week to assist with management and education reinforcement with new ileostomy.     Skilled home care RN to assist with nutrition and hydration evaluation, medication management, endurance evaluation and general status evaluation after hospitalization for surgery.       Skilled home care RN to  re evaluate patient's understanding about how to order ileostomy supplies as instructed by the inpatient WOC (Wound Ostomy Continence) Nurse Consult Team.     Skilled home care RN to assist with diabetic management in home setting this post op period.     Skilled home care RN to assist with management and education reinforcement with home lovenox injections in home setting as prescribed.     The inpatient Care Management RN will continue to follow for updated transition needs prn.    Eugenie Mcclendon,  DOTTIE.S.N., R.N., P.H.N..  Care Coordinator     Pager   [facility] [place]

## 2021-06-17 LAB
ANION GAP SERPL CALCULATED.3IONS-SCNC: 3 MMOL/L (ref 3–14)
BUN SERPL-MCNC: 6 MG/DL (ref 7–30)
CALCIUM SERPL-MCNC: 8.1 MG/DL (ref 8.5–10.1)
CHLORIDE SERPL-SCNC: 109 MMOL/L (ref 94–109)
CO2 SERPL-SCNC: 28 MMOL/L (ref 20–32)
COPATH REPORT: NORMAL
CREAT SERPL-MCNC: 0.65 MG/DL (ref 0.52–1.04)
ERYTHROCYTE [DISTWIDTH] IN BLOOD BY AUTOMATED COUNT: 13.6 % (ref 10–15)
GFR SERPL CREATININE-BSD FRML MDRD: >90 ML/MIN/{1.73_M2}
GLUCOSE BLDC GLUCOMTR-MCNC: 134 MG/DL (ref 70–99)
GLUCOSE BLDC GLUCOMTR-MCNC: 136 MG/DL (ref 70–99)
GLUCOSE BLDC GLUCOMTR-MCNC: 219 MG/DL (ref 70–99)
GLUCOSE BLDC GLUCOMTR-MCNC: 236 MG/DL (ref 70–99)
GLUCOSE SERPL-MCNC: 96 MG/DL (ref 70–99)
HCT VFR BLD AUTO: 35.7 % (ref 35–47)
HGB BLD-MCNC: 11.5 G/DL (ref 11.7–15.7)
MCH RBC QN AUTO: 30.4 PG (ref 26.5–33)
MCHC RBC AUTO-ENTMCNC: 32.2 G/DL (ref 31.5–36.5)
MCV RBC AUTO: 94 FL (ref 78–100)
PLATELET # BLD AUTO: 222 10E9/L (ref 150–450)
POTASSIUM SERPL-SCNC: 4.1 MMOL/L (ref 3.4–5.3)
RBC # BLD AUTO: 3.78 10E12/L (ref 3.8–5.2)
SODIUM SERPL-SCNC: 140 MMOL/L (ref 133–144)
WBC # BLD AUTO: 11.3 10E9/L (ref 4–11)

## 2021-06-17 PROCEDURE — 85027 COMPLETE CBC AUTOMATED: CPT | Performed by: STUDENT IN AN ORGANIZED HEALTH CARE EDUCATION/TRAINING PROGRAM

## 2021-06-17 PROCEDURE — 120N000002 HC R&B MED SURG/OB UMMC

## 2021-06-17 PROCEDURE — 250N000011 HC RX IP 250 OP 636: Performed by: PHYSICIAN ASSISTANT

## 2021-06-17 PROCEDURE — 999N000198 HC STATISTIC WOC PT EDUCATION, 16-30 MIN

## 2021-06-17 PROCEDURE — 250N000013 HC RX MED GY IP 250 OP 250 PS 637: Performed by: STUDENT IN AN ORGANIZED HEALTH CARE EDUCATION/TRAINING PROGRAM

## 2021-06-17 PROCEDURE — C9113 INJ PANTOPRAZOLE SODIUM, VIA: HCPCS | Performed by: PHYSICIAN ASSISTANT

## 2021-06-17 PROCEDURE — 80048 BASIC METABOLIC PNL TOTAL CA: CPT | Performed by: STUDENT IN AN ORGANIZED HEALTH CARE EDUCATION/TRAINING PROGRAM

## 2021-06-17 PROCEDURE — 36416 COLLJ CAPILLARY BLOOD SPEC: CPT | Performed by: STUDENT IN AN ORGANIZED HEALTH CARE EDUCATION/TRAINING PROGRAM

## 2021-06-17 PROCEDURE — 999N001017 HC STATISTIC GLUCOSE BY METER IP

## 2021-06-17 PROCEDURE — 250N000011 HC RX IP 250 OP 636: Performed by: STUDENT IN AN ORGANIZED HEALTH CARE EDUCATION/TRAINING PROGRAM

## 2021-06-17 PROCEDURE — 250N000013 HC RX MED GY IP 250 OP 250 PS 637: Performed by: PHYSICIAN ASSISTANT

## 2021-06-17 RX ORDER — PANTOPRAZOLE SODIUM 40 MG/1
40 TABLET, DELAYED RELEASE ORAL
Status: DISCONTINUED | OUTPATIENT
Start: 2021-06-18 | End: 2021-06-19 | Stop reason: HOSPADM

## 2021-06-17 RX ORDER — HYDROMORPHONE HYDROCHLORIDE 1 MG/ML
0.5 INJECTION, SOLUTION INTRAMUSCULAR; INTRAVENOUS; SUBCUTANEOUS EVERY 4 HOURS PRN
Status: COMPLETED | OUTPATIENT
Start: 2021-06-17 | End: 2021-06-17

## 2021-06-17 RX ORDER — HYDROMORPHONE HYDROCHLORIDE 2 MG/1
2 TABLET ORAL
Status: DISCONTINUED | OUTPATIENT
Start: 2021-06-17 | End: 2021-06-19 | Stop reason: HOSPADM

## 2021-06-17 RX ADMIN — VENLAFAXINE HYDROCHLORIDE 225 MG: 225 TABLET, EXTENDED RELEASE ORAL at 09:42

## 2021-06-17 RX ADMIN — INSULIN ASPART 2 UNITS: 100 INJECTION, SOLUTION INTRAVENOUS; SUBCUTANEOUS at 13:29

## 2021-06-17 RX ADMIN — PANTOPRAZOLE SODIUM 40 MG: 40 INJECTION, POWDER, FOR SOLUTION INTRAVENOUS at 09:42

## 2021-06-17 RX ADMIN — HYDROMORPHONE HYDROCHLORIDE 0.2 MG: 0.2 INJECTION, SOLUTION INTRAMUSCULAR; INTRAVENOUS; SUBCUTANEOUS at 02:44

## 2021-06-17 RX ADMIN — OXYCODONE HYDROCHLORIDE 10 MG: 5 TABLET ORAL at 08:22

## 2021-06-17 RX ADMIN — ACETAMINOPHEN 975 MG: 325 TABLET, FILM COATED ORAL at 17:54

## 2021-06-17 RX ADMIN — SIMETHICONE 160 MG: 80 TABLET, CHEWABLE ORAL at 08:22

## 2021-06-17 RX ADMIN — HYDROXYZINE HYDROCHLORIDE 25 MG: 25 TABLET, FILM COATED ORAL at 08:22

## 2021-06-17 RX ADMIN — ROSUVASTATIN CALCIUM 10 MG: 10 TABLET, FILM COATED ORAL at 09:42

## 2021-06-17 RX ADMIN — HYDROMORPHONE HYDROCHLORIDE 2 MG: 2 TABLET ORAL at 10:46

## 2021-06-17 RX ADMIN — HYDROCORTISONE SODIUM SUCCINATE 50 MG: 100 INJECTION, POWDER, FOR SOLUTION INTRAMUSCULAR; INTRAVENOUS at 20:51

## 2021-06-17 RX ADMIN — OXYCODONE HYDROCHLORIDE 10 MG: 5 TABLET ORAL at 00:50

## 2021-06-17 RX ADMIN — AMLODIPINE BESYLATE 5 MG: 5 TABLET ORAL at 09:43

## 2021-06-17 RX ADMIN — ENOXAPARIN SODIUM 40 MG: 40 INJECTION SUBCUTANEOUS at 21:01

## 2021-06-17 RX ADMIN — HYDROMORPHONE HYDROCHLORIDE 2 MG: 2 TABLET ORAL at 13:51

## 2021-06-17 RX ADMIN — HYDROMORPHONE HYDROCHLORIDE 2 MG: 2 TABLET ORAL at 16:54

## 2021-06-17 RX ADMIN — HYDROCORTISONE SODIUM SUCCINATE 50 MG: 100 INJECTION, POWDER, FOR SOLUTION INTRAMUSCULAR; INTRAVENOUS at 09:35

## 2021-06-17 RX ADMIN — HYDROXYZINE HYDROCHLORIDE 25 MG: 25 TABLET, FILM COATED ORAL at 14:22

## 2021-06-17 RX ADMIN — ACETAMINOPHEN 975 MG: 325 TABLET, FILM COATED ORAL at 02:44

## 2021-06-17 RX ADMIN — ENOXAPARIN SODIUM 40 MG: 40 INJECTION SUBCUTANEOUS at 09:50

## 2021-06-17 RX ADMIN — ACETAMINOPHEN 975 MG: 325 TABLET, FILM COATED ORAL at 09:50

## 2021-06-17 RX ADMIN — HYDROMORPHONE HYDROCHLORIDE 0.5 MG: 1 INJECTION, SOLUTION INTRAMUSCULAR; INTRAVENOUS; SUBCUTANEOUS at 17:54

## 2021-06-17 RX ADMIN — HYDROMORPHONE HYDROCHLORIDE 0.5 MG: 1 INJECTION, SOLUTION INTRAMUSCULAR; INTRAVENOUS; SUBCUTANEOUS at 21:21

## 2021-06-17 RX ADMIN — HYDROXYZINE HYDROCHLORIDE 25 MG: 25 TABLET, FILM COATED ORAL at 21:21

## 2021-06-17 ASSESSMENT — ACTIVITIES OF DAILY LIVING (ADL)
ADLS_ACUITY_SCORE: 13
ADLS_ACUITY_SCORE: 14
ADLS_ACUITY_SCORE: 13

## 2021-06-17 NOTE — CONSULTS
"  WO Nurse Inpatient [facility]   WO Nurse Inpatient Adult     Follow up Assessment   Assessment of new end Ileostomy Stoma complication(s) none   Mucocutaneous junction; intact   Peristomal complication(s) none   Pouch wear time:less than 24 hours  Following today's visit:Patient  is  able to demonstrate;       1. How to empty their pouch? No, reviewed/ demonstrated and asked to practice with nursing.      2. How to change their pouch?  No, patient observed pouch change with step by step directions on 6/16      3. How to read and record intake and output correctly? No, demonstrated how to measure output, asked to practice with nurses.     Objective data:  Patient history according to medical record: 46F hx medically refractory UC s/p TAC w/ EI 6/15. Progressing appropriately in the postoperative period.   Current Diet/Nutrition: Orders Placed This Encounter      Low Fiber Diet     TPN no   I/O last 3 completed shifts:  In: 2740 [P.O.:1540; I.V.:1200]  Out: 1060 [Urine:960; Stool:100]  Labs:    Recent Labs   Lab 06/17/21  0805 06/16/21  0811   HGB 11.5* 11.7   WBC 11.3* 14.3*   A1C  --  7.5*        Physical Exam:  Current pouching system:Keith 2 piece 70  Reason for pouch change today: ostomy education  Stoma appearance: healthy, pink and round  Stoma size; 1 1/4\"   Peristomal skin: intact  Stoma output :brown and pasty   Abdominal  Assessment  distended , NG still in place? No  Surgical Site: open to air  Pain: Dull ache  Is patient still on a PCA No    Interventions:  Patient's chart evaluated.  Focus of today's visit: refitting of appliance, verbal instruction , diet and hydration , pouch emptying, output measurement, odor/flatus management and lifestyle adjustments   Participant of teaching session today patient   Orders: Written  Change made with ostomy management today: No- Adapt ring, 57 flat two piece with fecal pouch.  Patient/family: observing  Supplies:Gathered and at " bedside    Plan:  Learning needs: pouch change return demonstration and discharge instructions  Preparation for discharge: Registered for samples from   Recommend home care? yes and by home WOC nurse if possible    Discussed plan of care with Patient  Nursing to notify the Provider(s) and re-consult the WOC Nurse if new ostomy concerns or discharge planned before next planned WOC visit.    WOC Nurse will return: Daily M-F  Face to face time: 35 minutes

## 2021-06-17 NOTE — PLAN OF CARE
POD # 1 s/p Laparoscopic total abdominal colectomy, end ileostomy. Patient reports poor pain control despite IV Dilaudid, Oxycodone, Atarax, and Tylenol, MD notified and will assess. Intermittent nausea with relief after intervention, no emesis during the shift. Pt was feeling anxious today, very tearful. She's feeling better now after her roommate discharged home. Pt on Low Fiber diet, ate 50% of her dinner meal, PIV infusing. Ileostomy appliance intact. Pt is voiding spontaneously in commode, didn't walk in the hallway due to pain. Risks and benefits explained, up in chair x 1. Lovenox info given to pt, she still needs injection teaching. Encouraged to use the IS, PCDs in place, continuous capnography.     Update: Patient reports better pain control, feeling less anxious. Nausea has improved. Pt is due to void soon.

## 2021-06-17 NOTE — PLAN OF CARE
Nasal canula at 2 L, other VSS. Patient is oriented and alert. Slept through most of the night. Ileostomy is intact; output is soft brown stool, passing flatus. Lap sites intact. Right forearm PIV saline locked, right hand PIV infusing LR. Denied nausea this shift. Rated pain 7-8/10; pain managed with Oxycodone, Tylenol, and Dilaudid. Ambulated to bathroom with stand by assist, voided adequate amount of yellow urine.Blood glucose taken, result of 134. Low fiber diet. Linen change due to diaphoresis. Continue with care of plan.

## 2021-06-17 NOTE — PLAN OF CARE
"Problem: Adult Inpatient Plan of Care  Goal: Plan of Care Review  Outcome: Improving    Problem: Adjustment to Surgery (Ileostomy)  Goal: Optimal Coping  Outcome: Improving     Problem: Postoperative Stoma Care (Ileostomy)  Goal: Optimal Stoma Healing  Outcome: Improving      Problem: Adult Inpatient Plan of Care  Goal: Optimal Comfort and Wellbeing  Outcome: Improving     Patient admitted 6/15 for colectomy w/ stoma creation. Ostomy stooling brown, formed stool; patient assisting with emptying ostomy and cleaning pouch opening. Patient requesting atarax today for anxiety but states anxiety is improved from yesterday. Pain is still severe, uncontrolled with current regimen; paged Lorna Sanabria PA-C re: prior to admission medication of oral dilaudid, Lorna adjusted PRN narcotics and patient reports improved pain control with oral dilaudid 2mg q 3h. Patient out of bed, ambulating halls x 1 today on writer's shift. Tolerating low fiber diet and tolerating fluids. LR infusion discontinued today. Wound Ostomy RN came to educate patient further about new ostomy, patient states to writer she \"knows (she) can do it\" after visit with ostomy nurse. Patient states anxiety regarding discharge to home tomorrow; per Lorna Sanabria PA-C, plan will not be to discharge tomorrow. Patient needs encouragement to get out of bed. She is alert and oriented, vitals stable, /63 (BP Location: Right arm)   Pulse 72   Temp 97.1  F (36.2  C) (Oral)   Resp 12   Wt 108.9 kg (240 lb 1.6 oz)   SpO2 95%   BMI 42.53 kg/m  . Continue POC and education regarding new ostomy cares and post-operative pain control.    "

## 2021-06-17 NOTE — PLAN OF CARE
Time: 0616-2605    Reason for Admission: POD # 2 s/p Laparoscopic total abdominal colectomy, end ileostomy.    Activity: Assist x1.     Neuro: A&O x4. Calm and cooperative.     GI/: Voiding spontaneously without difficulty. Ileostomy in place with brown stool.     Diet: Low Fiber, fair appetite.     Incisions/Drains: Abdominal lap sites with liquid bandage, CDI.    IV Access: 2 R PIV saline locked.     Labs: B    Vitals: VSS on RA    Pain: pt reporting abdominal pain. PRN IV dilaudid given and PO oxycodone given x1.     New changes this shift: Pt has one more dose of IV dilaudid she can have before bed.     Plan: Continue with plan of care.

## 2021-06-17 NOTE — PROGRESS NOTES
Colorectal Surgery Progress Note    Subjective: No acute events. Afebrile, vitals stable. Stoma with large volume thick brown stool and gas in bag. Feels overall better than yesterday.     Objective:   /69 (BP Location: Right arm)   Pulse 73   Temp 97.3  F (36.3  C) (Temporal)   Resp 14   SpO2 95%     PE:  Gen: lying in bed, comfortable  CV: Regular rate  Resp: non-labored breathing on 2L NC  Abd: Soft, non-tender, non-distended, ileostomy pink patent with large volume thick stool and gasin bag  Ext: warm and well perfused  Incision: lap sites c/d/i      Intake/Output Summary (Last 24 hours) at 6/16/2021 0631  Last data filed at 6/16/2021 0407  Gross per 24 hour   Intake 2267.5 ml   Output 1465 ml   Net 802.5 ml       A/P: 46F hx medically refractory UC s/p TAC w/ EI 6/15. Tolerating diet, will undergo WOYANCY teaching for new stoma with hopes of discharge in the next day or two.    - Low fiber diet; educated pt on going slow  - IV fluids  - Steroid taper  - Discontinue IV dilaudid in anticipation of discharge in coming days  - Dispo: home in next few days; TYRONE Koenig ostomy teaching    Seen and discussed with fellow who will discuss with staff.    Poncho Castellanos MD  Surgery PGY-1

## 2021-06-18 ENCOUNTER — DOCUMENTATION ONLY (OUTPATIENT)
Dept: SURGERY | Facility: CLINIC | Age: 46
End: 2021-06-18

## 2021-06-18 LAB
GLUCOSE BLDC GLUCOMTR-MCNC: 102 MG/DL (ref 70–99)
GLUCOSE BLDC GLUCOMTR-MCNC: 150 MG/DL (ref 70–99)
GLUCOSE BLDC GLUCOMTR-MCNC: 182 MG/DL (ref 70–99)
GLUCOSE BLDC GLUCOMTR-MCNC: 184 MG/DL (ref 70–99)
GLUCOSE BLDC GLUCOMTR-MCNC: 223 MG/DL (ref 70–99)
GLUCOSE BLDC GLUCOMTR-MCNC: 259 MG/DL (ref 70–99)
PLATELET # BLD AUTO: 272 10E9/L (ref 150–450)

## 2021-06-18 PROCEDURE — 250N000012 HC RX MED GY IP 250 OP 636 PS 637: Performed by: PHYSICIAN ASSISTANT

## 2021-06-18 PROCEDURE — 250N000011 HC RX IP 250 OP 636: Performed by: STUDENT IN AN ORGANIZED HEALTH CARE EDUCATION/TRAINING PROGRAM

## 2021-06-18 PROCEDURE — 250N000011 HC RX IP 250 OP 636: Performed by: PHYSICIAN ASSISTANT

## 2021-06-18 PROCEDURE — 999N001017 HC STATISTIC GLUCOSE BY METER IP

## 2021-06-18 PROCEDURE — 250N000013 HC RX MED GY IP 250 OP 250 PS 637: Performed by: STUDENT IN AN ORGANIZED HEALTH CARE EDUCATION/TRAINING PROGRAM

## 2021-06-18 PROCEDURE — 250N000013 HC RX MED GY IP 250 OP 250 PS 637: Performed by: PHYSICIAN ASSISTANT

## 2021-06-18 PROCEDURE — 120N000002 HC R&B MED SURG/OB UMMC

## 2021-06-18 PROCEDURE — 250N000013 HC RX MED GY IP 250 OP 250 PS 637: Performed by: SURGERY

## 2021-06-18 PROCEDURE — 999N000196 HC STATISTIC WOC PT EDUCATION, > 60 MIN

## 2021-06-18 PROCEDURE — 85049 AUTOMATED PLATELET COUNT: CPT | Performed by: STUDENT IN AN ORGANIZED HEALTH CARE EDUCATION/TRAINING PROGRAM

## 2021-06-18 PROCEDURE — 36415 COLL VENOUS BLD VENIPUNCTURE: CPT | Performed by: STUDENT IN AN ORGANIZED HEALTH CARE EDUCATION/TRAINING PROGRAM

## 2021-06-18 RX ORDER — PREDNISONE 20 MG/1
20 TABLET ORAL DAILY
COMMUNITY
Start: 2021-06-19 | End: 2021-11-11 | Stop reason: DRUGHIGH

## 2021-06-18 RX ORDER — HYDROXYZINE HYDROCHLORIDE 25 MG/1
25 TABLET, FILM COATED ORAL EVERY 6 HOURS PRN
Qty: 40 TABLET | Refills: 0 | Status: ON HOLD | OUTPATIENT
Start: 2021-06-18 | End: 2023-01-13

## 2021-06-18 RX ORDER — PREDNISONE 20 MG/1
20 TABLET ORAL DAILY
COMMUNITY
Start: 2021-06-19 | End: 2021-06-18

## 2021-06-18 RX ORDER — SIMETHICONE 80 MG
80-160 TABLET,CHEWABLE ORAL 4 TIMES DAILY PRN
Qty: 120 TABLET | Refills: 0 | Status: SHIPPED | OUTPATIENT
Start: 2021-06-19 | End: 2023-07-11

## 2021-06-18 RX ORDER — HYDROXYZINE HYDROCHLORIDE 25 MG/1
25 TABLET, FILM COATED ORAL
Status: DISCONTINUED | OUTPATIENT
Start: 2021-06-18 | End: 2021-06-18

## 2021-06-18 RX ORDER — GABAPENTIN 300 MG/1
900 CAPSULE ORAL 4 TIMES DAILY
Status: DISCONTINUED | OUTPATIENT
Start: 2021-06-18 | End: 2021-06-19 | Stop reason: HOSPADM

## 2021-06-18 RX ORDER — HYDROMORPHONE HYDROCHLORIDE 2 MG/1
2 TABLET ORAL EVERY 6 HOURS PRN
Qty: 10 TABLET | Refills: 0 | Status: SHIPPED | OUTPATIENT
Start: 2021-06-18 | End: 2021-07-15

## 2021-06-18 RX ORDER — HYDROXYZINE HYDROCHLORIDE 25 MG/1
25 TABLET, FILM COATED ORAL EVERY 4 HOURS PRN
Status: DISCONTINUED | OUTPATIENT
Start: 2021-06-18 | End: 2021-06-19 | Stop reason: HOSPADM

## 2021-06-18 RX ORDER — BACILLUS COAGULANS 1B CELL
1 CAPSULE ORAL DAILY
Qty: 30 TABLET | Refills: 1 | COMMUNITY
Start: 2021-06-21 | End: 2023-07-11

## 2021-06-18 RX ORDER — ACETAMINOPHEN 325 MG/1
975 TABLET ORAL EVERY 8 HOURS
Qty: 90 TABLET | Refills: 0 | Status: ON HOLD | OUTPATIENT
Start: 2021-06-18 | End: 2023-09-07

## 2021-06-18 RX ADMIN — HYDROMORPHONE HYDROCHLORIDE 2 MG: 2 TABLET ORAL at 17:18

## 2021-06-18 RX ADMIN — HYDROXYZINE HYDROCHLORIDE 25 MG: 25 TABLET, FILM COATED ORAL at 02:42

## 2021-06-18 RX ADMIN — HYDROXYZINE HYDROCHLORIDE 25 MG: 25 TABLET, FILM COATED ORAL at 20:40

## 2021-06-18 RX ADMIN — HYDROMORPHONE HYDROCHLORIDE 2 MG: 2 TABLET ORAL at 10:07

## 2021-06-18 RX ADMIN — VENLAFAXINE HYDROCHLORIDE 225 MG: 225 TABLET, EXTENDED RELEASE ORAL at 08:29

## 2021-06-18 RX ADMIN — HYDROXYZINE HYDROCHLORIDE 25 MG: 25 TABLET, FILM COATED ORAL at 10:07

## 2021-06-18 RX ADMIN — GABAPENTIN 900 MG: 300 CAPSULE ORAL at 20:38

## 2021-06-18 RX ADMIN — HYDROXYZINE HYDROCHLORIDE 25 MG: 25 TABLET, FILM COATED ORAL at 14:18

## 2021-06-18 RX ADMIN — PREDNISONE 20 MG: 20 TABLET ORAL at 08:29

## 2021-06-18 RX ADMIN — GABAPENTIN 900 MG: 300 CAPSULE ORAL at 17:16

## 2021-06-18 RX ADMIN — INSULIN ASPART 1 UNITS: 100 INJECTION, SOLUTION INTRAVENOUS; SUBCUTANEOUS at 14:19

## 2021-06-18 RX ADMIN — HYDROMORPHONE HYDROCHLORIDE 2 MG: 2 TABLET ORAL at 14:18

## 2021-06-18 RX ADMIN — GABAPENTIN 900 MG: 300 CAPSULE ORAL at 12:32

## 2021-06-18 RX ADMIN — AMLODIPINE BESYLATE 5 MG: 5 TABLET ORAL at 08:28

## 2021-06-18 RX ADMIN — PANTOPRAZOLE SODIUM 40 MG: 40 TABLET, DELAYED RELEASE ORAL at 08:29

## 2021-06-18 RX ADMIN — HYDROMORPHONE HYDROCHLORIDE 2 MG: 2 TABLET ORAL at 23:36

## 2021-06-18 RX ADMIN — ACETAMINOPHEN 975 MG: 325 TABLET, FILM COATED ORAL at 18:18

## 2021-06-18 RX ADMIN — HYDROMORPHONE HYDROCHLORIDE 2 MG: 2 TABLET ORAL at 00:12

## 2021-06-18 RX ADMIN — ROSUVASTATIN CALCIUM 10 MG: 10 TABLET, FILM COATED ORAL at 08:28

## 2021-06-18 RX ADMIN — ACETAMINOPHEN 975 MG: 325 TABLET, FILM COATED ORAL at 02:03

## 2021-06-18 RX ADMIN — ONDANSETRON 4 MG: 2 INJECTION INTRAMUSCULAR; INTRAVENOUS at 10:16

## 2021-06-18 RX ADMIN — ACETAMINOPHEN 975 MG: 325 TABLET, FILM COATED ORAL at 10:08

## 2021-06-18 RX ADMIN — HYDROMORPHONE HYDROCHLORIDE 2 MG: 2 TABLET ORAL at 20:38

## 2021-06-18 RX ADMIN — ENOXAPARIN SODIUM 40 MG: 40 INJECTION SUBCUTANEOUS at 21:44

## 2021-06-18 RX ADMIN — INSULIN GLARGINE 4 UNITS: 100 INJECTION, SOLUTION SUBCUTANEOUS at 21:44

## 2021-06-18 ASSESSMENT — ACTIVITIES OF DAILY LIVING (ADL)
ADLS_ACUITY_SCORE: 13

## 2021-06-18 NOTE — PROGRESS NOTES
"  WO Nurse Inpatient [facility]   WO Nurse Inpatient Adult     Follow up Assessment   Assessment of new end Ileostomy Stoma complication(s) none   Mucocutaneous junction; intact   Peristomal complication(s) none   Pouch wear time:24-48 hours  Following today's visit:Patient  is  able to demonstrate;       1. How to empty their pouch? Yes      2. How to change their pouch? Yes- with minimal verbal instruction      3. How to read and record intake and output correctly? Yes  Objective data:  Patient history according to medical record: 46F hx medically refractory UC s/p TAC w/ EI 6/15. Progressing appropriately in the postoperative period.   Current Diet/Nutrition: Orders Placed This Encounter      Low Fiber Diet      Diet     TPN no   I/O last 3 completed shifts:  In: 390 [P.O.:390]  Out: 1525 [Urine:1325; Stool:200]  Labs:    Recent Labs   Lab 06/17/21  0805 06/16/21  0811   HGB 11.5* 11.7   WBC 11.3* 14.3*   A1C  --  7.5*        Physical Exam:  Current pouching system:[place] 2 piece 57 with adapt ring  Reason for pouch change today: ostomy education and routine schedule  Stoma appearance: healthy, pink and round  Stoma size; 1 1/8\"   Peristomal skin: intact  Stoma output :brown and pasty at times more watery  Abdominal  Assessment  distended , NG still in place? No  Surgical Site: open to air  Pain: Dull ache  Is patient still on a PCA No    Interventions:  Patient's chart evaluated.  Focus of today's visit: refitting of appliance, pouch change return demonstration, pouch emptying, output measurement and discharge instructions   Participant of teaching session today patient   Orders: Reviewed  Change made with ostomy management today: No  Patient/family: active participation and performed with minimal verbal cues  Supplies:Gathered and at bedside    Plan:  Learning needs: All topics covered  Preparation for discharge: Registered for samples from , Prescriptions or note left on chart for " MD to sign/complete, Discussed making a WOC Nurse outpatient appointment upon discharge, Discussed when to follow up with a WOC Nurse in the future and Discussed how/ where to order supplies  Recommend home care? yes and by home WOC nurse if possible    Discussed plan of care with Patient  Nursing to notify the Provider(s) and re-consult the WOC Nurse if new ostomy concerns or discharge planned before next planned WOC visit.    WOC Nurse will return: Daily M-F  Face to face time: > 60 minutes          [employer]     Name: Doretha Fernandez  Date: 6/18/2021    To order your ostomy supplies    The ostomy Supplier needs this supply list  to process your order. You will need to fax/deliver this list, along with your Insurance information. Your home care nurse can assist with this process.                 List of Ostomy Distributors      [facility]  Ph. [phone] Fax # [phone]  [facility]   Ph. [phone]  [employer] [facility]   Ph. [phone]  [facility]   Ph. [phone]  Or Call your insurance provider for their preferred supplier    Your Medical Supplier will need your surgeon's name, phone and fax number    Clinic:                     Phone                            Fax  Colorectal Surgery:    [phone]    Verbal Order for ostomy supplies for 1 Month per:                                          Stephy South RN, CWOCN                                                                      Authorizing MD: Dr. Quinton Ram    Quantity of pouches: 20/m    Request the following supplies:      Keith    2 piece flat wafer 57mm  # [id number]     Fecal pouch 57mm- # [id number]                    Accessories  2  barrier ring #[id number]     Adapt powder #[id number]    No sting film barrier # [id number]                          [place] barrier extenders #[id number]                          Adapt odor eliminator and lubricant  236ml bottle # [id number]     -9 Spray room deodorizer #[id number]                              Change your pouch three times a week, more often if leaking.    If you are cutting a hole in the wafer of your pouch, recheck stoma size and adjust pouch opening as needed every week    . Call the Ostomy Nurse at [facility]       [address] [place] [address] VINNY GO : [phone]   Schedule a follow-up visit in 2 to 4 weeks after your surgery, sooner if having problems Bring a complete set of pouch-changing supplies to this visit    [facility] outpatient [facility] department  [address] [place] [address]   number- [phone]      Problems you should Report  - The stoma turns blue or darker in color.  - Cuts or sores around the stoma.  - Red, raw or painful skin around the stoma.  - Any bulging of the skin around the stoma.  - A pouch that leaks every day.  - Problems making the right size hole in the pouch wafer.    Please call with any questions or concerns.

## 2021-06-18 NOTE — PLAN OF CARE
Pt is tearful and anxious about cares/discharge tomorrow. A x O, VSS on RA. Intermittent nausea, improved by IV Zofran. Pain managed w/ PO Dilaudid and Tylenol. Colostomy w/ formed stool. Bag due to be changed. Pt needs to practice emptying and changing bag. BG checks before meals. SBA. Resting between cares. Continue POC.

## 2021-06-18 NOTE — DISCHARGE INSTRUCTIONS
[facility]     Name: Doretha Fernandez  Date: 6/18/2021    To order your ostomy supplies    The ostomy Supplier needs this supply list  to process your order. You will need to fax/deliver this list, along with your Insurance information. Your home care nurse can assist with this process.                 List of Ostomy Distributors      [facility]  Ph. [phone] Fax # [phone]  [facility]   Ph. [phone] ext- [phone]  [facility]   Ph. [phone]  [facility]   Ph. [phone]  Or Call your insurance provider for their preferred supplier    Your Medical Supplier will need your surgeon's name, phone and fax number    Clinic:                     Phone                            Fax  Colorectal Surgery:    [phone]    Verbal Order for ostomy supplies for 1 Month per:                                          Stephy South RN, CWOCN                                                                      Authorizing MD: Dr. Quinton Ram    Quantity of pouches: 20/m    Request the following supplies:      Keith    2 piece flat wafer 57mm  # 68556     Fecal pouch 57mm- # 15433                    Accessories  2  barrier ring #5755     Adapt powder #7906    No sting film barrier # 3345                          Keith barrier extenders #02885                          Adapt odor eliminator and lubricant 236ml bottle # 37015     M-9 Spray room deodorizer #3811                              Change your pouch three times a week, more often if leaking.    If you are cutting a hole in the wafer of your pouch, recheck stoma size and adjust pouch opening as needed every week    . Call the Ostomy Nurse at [facility]       [address] [place] [address], [place] : [phone]   Schedule a follow-up visit in 2 to 4 weeks after your surgery, sooner if having problems Bring a complete set of  pouch-changing supplies to this visit    [facility] outpatient [facility] department  [address] [place] [address]   number- [phone]      Problems you should Report  - The stoma turns blue or darker in color.  - Cuts or sores around the stoma.  - Red, raw or painful skin around the stoma.  - Any bulging of the skin around the stoma.  - A pouch that leaks every day.  - Problems making the right size hole in the pouch wafer.    Please call with any questions or concerns.

## 2021-06-18 NOTE — DISCHARGE SUMMARY
[facility]  Discharge Summary  Colon and Rectal Surgery     Doretha Fernandez MRN# [id number]   YOB: 1975 Age: 46 year old     Date of Admission:  6/15/2021  Date of Discharge::  6/19/2021  Admitting Physician:  Quinton Ram MD  Discharge Physician:  Quinton Ram MD  Primary Care Physician:        Anna Naidu          Admission Diagnoses:   Ulcerative colitis without complications, unspecified location (H) [K51.90]  Refractory ulcerative colitis  Anxiety     History of melanoma unknown primary 2017  History of nivolumab induced colitis  History of c.diff  Seronegative RA  Chronic steroid use  Steroid induced hyperglycemia  History of cushings  Chronic pain         Discharge Diagnosis:   Ulcerative colitis without complications, unspecified location (H) [K51.90]  Refractory ulcerative colitis    History of melanoma unknown primary 2017  History of nivolumab induced colitis  History of c.diff  Seronegative RA  Chronic steroid use  Steroid induced hyperglycemia  History of cushings  Chronic pain         Procedures:   6/15/2021: PROCEDURES PERFORMED:   1.  Laparoscopic total abdominal colectomy with end ileostomy.         Consultations:   WOUND OSTOMY CONTINENCE NURSE  IP CONSULT  CARE MANAGEMENT / SOCIAL WORK IP CONSULT         Imaging Studies:     Results for orders placed or performed during the hospital encounter of 06/15/21   POC US Guidance Needle Placement    Impression    TAP          Medications Prior to Admission:     Medications Prior to Admission   Medication Sig Dispense Refill Last Dose     Alcohol Swabs (ALCOHOL PREP) 70 % PADS 1 applicator 3 times daily 100 each 0 Past Week at Unknown time     amLODIPine (NORVASC) 5 MG tablet Take 1 tablet (5 mg) by mouth daily Needs follow up visit before next refill due 30 tablet 0 6/14/2021 at 1200     blood glucose (NO BRAND SPECIFIED) lancets standard Use to test blood sugar 3  times daily or as directed. 1 each 0 6/14/2021 at Unknown time     blood glucose (NO BRAND SPECIFIED) test strip Use to test blood sugar 3 times daily or as directed. 200 strip 0 6/14/2021 at Unknown time     Calcium Carb-Cholecalciferol 600-800 MG-UNIT TABS Take 800 mg by mouth every morning (before breakfast)   Past Week at Unknown time     calcium carbonate (TUMS) 500 MG chewable tablet Take 1 tablet (500 mg) by mouth 3 times daily as needed for heartburn 30 tablet 0 Past Week at Unknown time     gabapentin (NEURONTIN) 300 MG capsule TAKE THREE CAPSULES BY MOUTH FOUR TIMES A  capsule 0 6/14/2021 at Unknown time     insulin glargine (LANTUS PEN) 100 UNIT/ML pen Inject 6 Units Subcutaneous At Bedtime 3 mL 0 6/14/2021 at 2000     insulin lispro (HUMALOG KWIKPEN) 100 UNIT/ML (1 unit dial) KWIKPEN Inject 1-7 Units Subcutaneous 4 times daily (with meals and nightly) Per the insulin sliding scale provided in the discharge summary. 3 mL 2 6/14/2021 at 2000     metFORMIN (GLUCOPHAGE-XR) 500 MG 24 hr tablet Take 1,000 mg by mouth Daily with breakfast.   6/14/2021 at 0800     pantoprazole (PROTONIX) 40 MG EC tablet Take 1 tablet (40 mg) by mouth every morning (before breakfast) Needs follow up visit before next refill due 30 tablet 0 6/14/2021 at 0800     rosuvastatin (CRESTOR) 10 MG tablet Take 1 tablet (10 mg) by mouth daily 90 tablet 3 6/14/2021 at 1400     sulfamethoxazole-trimethoprim (BACTRIM) 400-80 MG tablet Take 1 tablet by mouth daily 120 tablet 0 6/14/2021 at 0800     venlafaxine (EFFEXOR-ER) 225 MG 24 hr tablet TAKE ONE TABLET BY MOUTH ONCE DAILY 90 tablet 1 6/14/2021 at 0800     VITAMIN D3 25 MCG (1000 UT) tablet TAKE THREE TABLETS BY MOUTH ONCE DAILY 300 tablet 1 Past Week at Unknown time     [DISCONTINUED] ACCU-CHEK GUIDE test strip USE TO TEST BLOOD SUGAR TWO TIMES A DAY OR AS DIRECTED 200 strip 0 6/14/2021 at Unknown time     [DISCONTINUED] acetaminophen (TYLENOL) 500 MG tablet Take 1,000 mg by mouth  every 8 hours as needed for mild pain   6/15/2021 at 0800     [DISCONTINUED] BD BLAYNE U/F 32G X 4 MM insulin pen needle USE AS DIRECTED 100 each 0 6/14/2021 at Unknown time     [DISCONTINUED] blood glucose monitoring (ACCU-CHEK FASTCLIX) lancets USE TWO TIMES A DAY OR AS DIRECTED 102 each 9 6/14/2021 at Unknown time     [DISCONTINUED] blood glucose monitoring (NO BRAND SPECIFIED) meter device kit Use to test blood sugar 3 times daily or as directed. 1 kit 0      [DISCONTINUED] dicyclomine (BENTYL) 20 MG tablet Take 1 tablet (20 mg) by mouth 3 times daily (with meals) 30 tablet 0 Past Month at Unknown time     [DISCONTINUED] ferrous fumarate 65 mg, [place]. FE,-Vitamin C 125 mg (VITRON-C)  MG TABS tablet Take 1 tablet by mouth daily 30 tablet 1 6/11/2021     [DISCONTINUED] HYDROmorphone (DILAUDID) 2 MG tablet Take 1 tablet (2 mg) by mouth every 2 hours as needed for moderate to severe pain 30 tablet 0 6/14/2021 at 2000     [DISCONTINUED] hydrOXYzine (ATARAX) 25 MG tablet Take 1 tablet (25 mg) by mouth At Bedtime As needed 30 tablet 1 6/15/2021 at 0630     [DISCONTINUED] neomycin (MYCIFRADIN) 500 MG tablet Take 2 tablets (1,000 mg) by mouth every 6 hours At 8:00 am, 2:00 pm, 8:00 pm the day prior to your surgery with flagyl and zofran. 6 tablet 0 6/14/2021 at 2000     [DISCONTINUED] ondansetron (ZOFRAN) 4 MG tablet Take 1 tablet (4 mg) by mouth every 6 hours At 8:00 am, 2:00 pm, 8:00 pm the day prior to your surgery with neomycin and flagyl. 3 tablet 0 6/14/2021 at 2000     [DISCONTINUED] ondansetron (ZOFRAN) 8 MG tablet Take 8 mg by mouth every 8 hours as needed for nausea (Pt has not taken in past 6 months)   Unknown at Unknown time     [DISCONTINUED] oxyCODONE (ROXICODONE) 5 MG tablet Take 1 tablet (5 mg) by mouth every 4 hours as needed for moderate to severe pain 30 tablet 0 6/14/2021 at 2000     [DISCONTINUED] polyethylene glycol (MIRALAX) 17 g packet Take 238 g by mouth See Admin Instructions Starting at 4 pm  "night prior to surgery. Refer to \"Getting Ready for Surgery\" instructions. 14 packet 0 6/14/2021 at Unknown time     [DISCONTINUED] predniSONE (DELTASONE) 10 MG tablet Take 1 tablet (10 mg) by mouth daily for 14 days 14 tablet 0 Unknown at Unknown time     [DISCONTINUED] predniSONE (DELTASONE) 20 MG tablet Take 2 tablets (40 mg) by mouth daily for 13 days 26 tablet 0 6/14/2021 at 0800     [DISCONTINUED] predniSONE (DELTASONE) 20 MG tablet Take 1.5 tablets (30 mg) by mouth daily for 14 days 21 tablet 0 Unknown at Unknown time     [DISCONTINUED] predniSONE (DELTASONE) 20 MG tablet Take 1 tablet (20 mg) by mouth daily for 14 days 14 tablet 0 Unknown at Unknown time     [DISCONTINUED] predniSONE (DELTASONE) 5 MG tablet Take 1 tablet (5 mg) by mouth daily for 14 days 14 tablet 0 Unknown at Unknown time     [DISCONTINUED] predniSONE (DELTASONE) 5 MG tablet Take 1 tablet (5 mg) by mouth daily for 7 days 7 tablet 0 Unknown at Unknown time     [DISCONTINUED] PROCTOFOAM HC rectal foam PLACE 1 APPLICATORFUL RECTALLY TWO TIMES A DAY (Patient not taking: Reported on 6/10/2021) 10 g 1 Not Taking at Unknown time            Discharge Medications:     Current Discharge Medication List      START taking these medications    Details   enoxaparin ANTICOAGULANT (LOVENOX) 40 MG/0.4ML syringe Inject 0.4 mLs (40 mg) Subcutaneous every 12 hours for 26 days  Qty: 20.8 mL, Refills: 0    Associated Diagnoses: Ulcerative colitis without complications, unspecified location (H)      simethicone (MYLICON) 80 MG chewable tablet Take 1-2 tablets ( mg) by mouth 4 times daily as needed for cramping  Qty: 120 tablet, Refills: 0    Associated Diagnoses: Acute post-operative pain         CONTINUE these medications which have CHANGED    Details   acetaminophen (TYLENOL) 325 MG tablet Take 3 tablets (975 mg) by mouth every 8 hours As scheduled for the next 7-10 days, then decrease both dose & frequency to as needed there after.  Qty: 90 tablet, " Refills: 0    Associated Diagnoses: Acute post-operative pain      HYDROmorphone (DILAUDID) 2 MG tablet Take 1 tablet (2 mg) by mouth every 6 hours as needed for moderate to severe pain  Qty: 10 tablet, Refills: 0    Associated Diagnoses: Acute post-operative pain      hydrOXYzine (ATARAX) 25 MG tablet Take 1 tablet (25 mg) by mouth every 6 hours as needed for anxiety  Qty: 40 tablet, Refills: 0    Associated Diagnoses: Acute post-operative pain; Anxiety state      insulin glargine (LANTUS PEN) 100 UNIT/ML pen Inject 4 Units Subcutaneous At Bedtime  Qty:      Comments: If Lantus is not covered by insurance, may substitute Basaglar at same dose and frequency.        predniSONE (DELTASONE) 20 MG tablet Take 1 tablet (20 mg) by mouth daily Prednisone 20mg daily x5 days, then decrease to prednisone 10mg daily x5 days, then decrease to prednisone 5mg daily x5 days, then decrease to prednisone 5mg every other day for 5 days, then off.  Qty:        VITRON-C  MG TABS tablet Take 1 tablet by mouth daily  Qty: 30 tablet, Refills: 1    Associated Diagnoses: Persistent insomnia         CONTINUE these medications which have NOT CHANGED    Details   Alcohol Swabs (ALCOHOL PREP) 70 % PADS 1 applicator 3 times daily  Qty: 100 each, Refills: 0    Associated Diagnoses: Steroid-induced diabetes mellitus, sequela (H)      amLODIPine (NORVASC) 5 MG tablet Take 1 tablet (5 mg) by mouth daily Needs follow up visit before next refill due  Qty: 30 tablet, Refills: 0    Associated Diagnoses: Hypertension, unspecified type      blood glucose (NO BRAND SPECIFIED) lancets standard Use to test blood sugar 3 times daily or as directed.  Qty: 1 each, Refills: 0    Associated Diagnoses: Steroid-induced diabetes mellitus, sequela (H)      blood glucose (NO BRAND SPECIFIED) test strip Use to test blood sugar 3 times daily or as directed.  Qty: 200 strip, Refills: 0    Associated Diagnoses: Steroid-induced diabetes mellitus, sequela (H)       Calcium Carb-Cholecalciferol 600-800 MG-UNIT TABS Take 800 mg by mouth every morning (before breakfast)      calcium carbonate (TUMS) 500 MG chewable tablet Take 1 tablet (500 mg) by mouth 3 times daily as needed for heartburn  Qty: 30 tablet, Refills: 0    Associated Diagnoses: Gastroesophageal reflux disease without esophagitis      gabapentin (NEURONTIN) 300 MG capsule TAKE THREE CAPSULES BY MOUTH FOUR TIMES A DAY  Qty: 360 capsule, Refills: 0    Associated Diagnoses: Other chronic pain      insulin lispro (HUMALOG KWIKPEN) 100 UNIT/ML (1 unit dial) KWIKPEN Inject 1-7 Units Subcutaneous 4 times daily (with meals and nightly) Per the insulin sliding scale provided in the discharge summary.  Qty: 3 mL, Refills: 2    Associated Diagnoses: Steroid-induced diabetes mellitus, sequela (H)      metFORMIN (GLUCOPHAGE-XR) 500 MG 24 hr tablet Take 1,000 mg by mouth Daily with breakfast.      pantoprazole (PROTONIX) 40 MG EC tablet Take 1 tablet (40 mg) by mouth every morning (before breakfast) Needs follow up visit before next refill due  Qty: 30 tablet, Refills: 0    Associated Diagnoses: Gastroesophageal reflux disease without esophagitis      rosuvastatin (CRESTOR) 10 MG tablet Take 1 tablet (10 mg) by mouth daily  Qty: 90 tablet, Refills: 3    Associated Diagnoses: Diabetes mellitus, iatrogenic (H)      sulfamethoxazole-trimethoprim (BACTRIM) 400-80 MG tablet Take 1 tablet by mouth daily  Qty: 120 tablet, Refills: 0    Associated Diagnoses: Preventive measure      venlafaxine (EFFEXOR-ER) 225 MG 24 hr tablet TAKE ONE TABLET BY MOUTH ONCE DAILY  Qty: 90 tablet, Refills: 1    Associated Diagnoses: Mild major depression (H); Anxiety state      VITAMIN D3 25 MCG (1000 UT) tablet TAKE THREE TABLETS BY MOUTH ONCE DAILY  Qty: 300 tablet, Refills: 1    Associated Diagnoses: Vitamin D deficiency         STOP taking these medications       BD BLAYNE U/F 32G X 4 MM insulin pen needle Comments:   Reason for Stopping:         blood  glucose monitoring (ACCU-CHEK FASTCLIX) lancets Comments:   Reason for Stopping:         blood glucose monitoring (NO BRAND SPECIFIED) meter device kit Comments:   Reason for Stopping:         dicyclomine (BENTYL) 20 MG tablet Comments:   Reason for Stopping:         neomycin (MYCIFRADIN) 500 MG tablet Comments:   Reason for Stopping:         ondansetron (ZOFRAN) 4 MG tablet Comments:   Reason for Stopping:         ondansetron (ZOFRAN) 8 MG tablet Comments:   Reason for Stopping:         oxyCODONE (ROXICODONE) 5 MG tablet Comments:   Reason for Stopping:         polyethylene glycol (MIRALAX) 17 g packet Comments:   Reason for Stopping:         PROCTOFOAM HC rectal foam Comments:   Reason for Stopping:                     Brief History of Illness:   47 y/o F, PMH of melanoma unknown primary (2017), s/p left axillary LN dissection (2017, Dr. Cool) in remission, history of adjuvant nivolumab induced colitis in 2018 treated with remicade (x1 at [place]) and Entyvio, h/o c.diff (recent bout, started PO vanc 4/7), seronegative RA on prednisone & tocilizumab, steroid induced DM, history of cushings, chronic pain, relatively new diagnosis of UC, received Entyvio x2 (most recent infusions on 5/12, 5/26), mostly steroid dependent x3 years.  Pt has had recent readmissions for hematochezia, acute on chronic abdominal & rectal pain, nausea for refractory UC and concerns regarding risks associated with melanoma recurrence with using standard UC maintenance medications.  Pt underwent total abdominal colectomy and end ileostomy on 6/15/2021.           Hospital Course:   Post-operatively pt was gently fluid resuscitated.  Major was removed and pt was able to void.  Pt had quick return of bowel function and was able to tolerate a low fiber diet.     Pt was seen by WOCN and taught how to manage her new ostomy. Pt was deemed appropriate for discharge home.  Pt was taught how to self-inject post-operative prophylactic lovenox for which  she is to do for a total of 30 days.  Post-operative pain was controlled with scheduled tylenol, gabapentin, robaxin, atarax prn for anxiety and prn dilaudid.  At home, pt was alternating between dilaudid and oxycodone.  We instructed patient to stop using both agents.  Pt felt that dilaudid was more effective for her.  Therefore we instructed patient to dispose of the oxycodone and only use dilaudid.  Pt still had about fifteen tablets of dilaudid from her most recent admission.  Pt was also counseled to wean herself down on dilaudid every few days.      Pt was also given a prednisone taper.  Pt is to take prednisone 20mg daily for 5 days, then decrease to prednisone 10mg daily for 5 days, then decrease to prednisone 5mg daily for 5 days, then decrease to prednisone 5mg every other day for 5 days, then off.  Pt follows with Dr. OG, rheumatologist at [place].  Pt should follow up with Dr. OG in about 2-3 weeks.  Pt has also seen therapist Isaura Taylor with [facility] [employer] [facility] in [place].  Encouraged pt to call [facility] to make an appointment to be seen next week to help cope with all the changes she has undergone and to help with the anxiety associated with transitioning home.     Patient is to follow up in the Colon and Rectal Surgery Clinic in 2-3 week with Jena Null NP and then with Dr. Ram in 2-3 weeks after. Pt should also follow up with her PCP in 1-2 weeks to reassess her diabetic regimen as she weans down on steroids.          Day of Discharge Physical Exam:   Blood pressure 122/80, pulse 82, temperature 96.7  F (35.9  C), temperature source Temporal, resp. rate 17, weight 108.9 kg (240 lb 1.6 oz), SpO2 94 %, not currently breastfeeding.    Gen: AAOx3, NAD  Pulm: Non-labored breathing  Abd: Soft, appropriately tender, no guarding/rebound   Incision C/D/I    Stoma pink and viable with stool and gas in bag  Ext:  Warm and well-perfused         Final Pathology Result:   FINAL PATHOLOGY  RESULT PENDING AT THE TIME OF DISCHARGE           Discharge Instructions and Follow-Up:     Discharge Procedure Orders   Home care nursing referral   Referral Priority: Routine Referral Type: Home Health Therapies & Aides   Number of Visits Requested: 1     MD face to face encounter   Order Comments: Documentation of Face to Face and Certification for Home Health Services    I certify that patient: Doretha Fernandez is under my care and that I, or a nurse practitioner or physician's assistant working with me, had a face-to-face encounter that meets the physician face-to-face encounter requirements with this patient on: June 16, 2021    This encounter with the patient was in whole, or in part, for the following medical condition, which is the primary reason for home health care: Ulcerative Colitis and formation of an end ileostomy.    I certify that, based on my findings, the following services are medically necessary home health services: Skilled home care RN and WOC RN.    My clinical findings support the need for the above services because: MD orders and recommendations of the inpatient WOC RN Consult Team.    Further, I certify that my clinical findings support that this patient is homebound secondary to illness.    Based on the above findings. I certify that this patient is confined to the home and needs intermittent skilled nursing care, physical therapy and/or speech therapy.  The patient is under my care, and I have initiated the establishment of the plan of care.  This patient will be followed by a physician who will periodically review the plan of care.  Physician/Provider to provide follow up care: Anna Naidu    Attending hospital physician (the Medicare certified [place] provider): Dr. Quinton Ram M.D.  Physician Signature: See electronic signature associated with these discharge orders.    Date: 6/16/2021     Reason for your hospital stay   Order Comments: PROCEDURES PERFORMED:   1.  Laparoscopic  total abdominal colectomy with end ileostomy.     Monitor and record   Order Comments: MONITOR AND RECORD:   1. Total daily ileostomy outputs.  Keep a record/log of the total 24 hour volumes.  Bring this record/log with you to your first post-operative colon and rectal surgery clinic appointment.     Adult [facility] Follow-up and recommended labs and tests   Order Comments: WOUND CARE  -Inspect your wounds daily for signs of infection (increased redness, drainage, pain)  -Keep your wound clean and dry  -You may shower, but do not soak in tub or pool    NOTIFY  Please contact Annamarie Espinal RN or Lou VILLATORO at [phone] for problems after discharge such as:  -Temperature > 101F, chills, rigors, dizziness  -Redness around or purulent drainage from wound  -Inability to tolerate diet, nausea or vomiting  -You stop passing gas (or your stoma stops functioning), develop significant bloating, abdominal pain  -Have blood in stools/vomit  -Have severe diarrhea/constipation  -Any other questions or concerns.  - At nights (after 4:30pm), on weekends, or if urgent, call [phone] and ask the  to speak with the on-call Colorectal Surgery resident or fellow    -Measure your total daily ileostomy output and record.  If you have LESS than 500mL or GREATER than 1200mL of ileostomy output within a 24 hour period, please notify the Colorectal Nurse.  If you have greater than 1000-1200mL in a 24 hour period or greater than 500cc for three consecutive 8 hour shifts, take Imodium 2mg once, stay hydrated especially with Pedialyte and call the Colorectal Clinic to further discuss.      Medication Instructions  Some of your medications may have changed. Please take only prescribed and resumed medications     FOLLOW-UP  1.  You will need to follow-up with Jena Null NP in the Colon and Rectal Surgery clinic in 2-3 week(s) and then with CRS Staff: Dr. Quinton Ram in 2-3 weeks after.  Please contact our  clinic scheduler (phone # [phone]) if you have not heard from our clinic in 3 business days afer discharge to schedule a follow-up appointment. Please bring your log of daily ileostomy outputs to your follow up clinic appointment.     2.  Follow up with your primary care provider in 1-2 weeks after discharge from the hospital to review this hospitalization and to discuss your diabetic medication regimen.  As you taper off the steroids, you may not require as much insulin.  Continue to check your blood glucoses 4 times per day especially during the post-operative period.      3.  Make an appointment with therapist Isaura Taylor at [facility] to be seen within 1 week of discharge home.     4.  Make an appointment with your Rheumatologist, Dr. OG at [place] to be seen in 2-3 weeks.     5.  Follow up with your Gastroenterologist in 3-4 weeks.      6.  Throw away the oxycodone.  We recommend only using one narcotic medication at a time.           ORAL REHYDRATION RECIPE for Home Use  With your new ileostomy, you are at increased risk for dehydration,  Especially if you have high ileostomy outputs, or low appetites, you can help prevent it by drinking this mixture as directed.  Some examples of commercially prepared Oral Rehydration Solutions are: Pedialyte (solution, powder packet, Freeze Pops), or Drip Drop.     Electrolyte Water   -4 cups of water (equal to 1 quart or 32 ounces)   -  teaspoon salt   -6 teaspoons sugar   -Crystal Light if desired (or other choice Nutrasweet or Splenda flavoring - SUGAR-FREE) to taste (recommend lemonade or orange-pineapple flavors, but any flavor will work)    Add two cups of tap water to a large container. With measuring spoons (not table silverware), add the dry ingredients and stir or shake well. Add two additional cups of water. This will equal one quart of Electrolyte Water. Add sugar-free flavoring if desired. Best if chilled. Sip solution throughout the day. Discard  after 24 hours.            Appointments on [place] and/or [facility] (with [facility] or [facility] provider or service). Call [phone] if you haven't heard regarding these appointments within 7 days of discharge.     Activity   Order Comments: Your activity upon discharge:   ACTIVITY  -No lifting, pushing, pulling greater than 10 lbs and no strenuous exercise for 6 weeks   -No driving while on narcotic analgesics (i.e. Percocet, oxycodone, Vicodin)  -No driving until you are able to fully twist to both sides or slam on brakes quickly and without any pain  -We HIGHLY encourage you to walk at least 5-6 times per day.  And walk for at least 10-15 minutes at a time or what is weather permitting.  Walking will help you prevent blood clots, it will help with post-surgical healing, it will help decrease risk of post-operative pnuemonias and will help you maintain your endurance and strength.     Order Specific Question Answer Comments   Is discharge order? Yes      Diet   Order Comments: Follow this diet upon discharge:   DIET  -Low Residue Diet for at least 4-6 weeks unless cleared by Colorectal surgery.  No raw vegetables, fruit skins, fibrous foods that require a lot of chewing, nuts, seeds, corn, popcorn.   -We recommend eating slowly, chewing thoroughly, eating small frequent meals throughout the day  -Stay well hydrated.    -Follow the food recommendations given by the Nutritionist and the Wound Ostomy Nurse.     Order Specific Question Answer Comments   Is discharge order? Yes             Home Health Care:     Arranged           Discharge Disposition:     Discharged to home      Condition at discharge: Stable    ADDENDUM:    Patient seen and examined independently of medical student.    Doing well will appropriate stoma output.    Abd: soft, appropriately tender, nondistended. Stoma pink with green succus in appliance, and incisions c/d/i.    Plan will be to discharge today on lovenox and steroid taper, will  appropriate follow ups in clinic.

## 2021-06-18 NOTE — PROGRESS NOTES
Colorectal Surgery Progress Note    Subjective: POD3 s/p TAC w/ EI. Pain controlled. Ostomy with gas, liquid brown output in bag. Tolerated low fiber diet without nausea/vomiting. Ambulated within room yesterday.      Objective:   /86 (BP Location: Right arm)   Pulse 67   Temp 98.2  F (36.8  C) (Oral)   Resp 18   Wt 108.9 kg (240 lb 1.6 oz)   SpO2 96%   BMI 42.53 kg/m      PE:  Gen: lying in bed, sleepy  CV: Regular rate  Resp: non-labored breathing on RA  Abd: Soft, non-tender, non-distended, ileostomy pink patent with large volume liquid stool and gas in bag  Ext: warm and well perfused  Incision: lap sites c/d/i      Intake/Output Summary (Last 24 hours) at 6/16/2021 0631  Last data filed at 6/16/2021 0407  Gross per 24 hour   Intake 2267.5 ml   Output 1465 ml   Net 802.5 ml       A/P: 46F hx medically refractory UC s/p TAC w/ EI 6/15. Tolerating diet, will undergo WOCN teaching for new stoma with hopes of discharge in the next day or two. Will optimize patient's pain control prior to discharge.     - Low fiber diet  - PO dilaudid prn, Atarax prn  - Steroid taper  - Encourage ambulation, OOB  - Dispo: home as early as today but likely 6/19; Lovenox, WOCN ostomy teaching.    Seen and discussed with fellow who will discuss with staff.    Poncho Castellanos MD  Surgery PGY-1

## 2021-06-18 NOTE — PLAN OF CARE
Problem: Adult Inpatient Plan of Care  Goal: Optimal Comfort and Wellbeing  Outcome: Improving     Problem: Pain (Ileostomy)  Goal: Acceptable Pain Control  Outcome: Improving     Pt ate 100% of her supper. Denies nausea and vomiting. Pt was given tylenol, dilaudid for pain to abdomen. Aromatherapy and vistaril given for anxiety. Pt requested for more doses of vistaril and MD was paged. Call back received from MD and a one time dose of vistaril was ordered and given. Colostomy is stooling and emptied. Blood glucose level was 223 and 1 unit of insulin was given. On subcutaneous Lovenox and pt is monitored for bleeding. /86 (BP Location: Right arm)   Pulse 67   Temp 98.2  F (36.8  C) (Oral)   Resp 18   Wt 108.9 kg (240 lb 1.6 oz)   SpO2 96%. Pt is sleeping at this time. SBA with activities. Will continue to monitor.

## 2021-06-19 VITALS
RESPIRATION RATE: 20 BRPM | BODY MASS INDEX: 42.53 KG/M2 | SYSTOLIC BLOOD PRESSURE: 117 MMHG | TEMPERATURE: 98.1 F | WEIGHT: 240.1 LBS | DIASTOLIC BLOOD PRESSURE: 76 MMHG | OXYGEN SATURATION: 92 % | HEART RATE: 74 BPM

## 2021-06-19 LAB — GLUCOSE BLDC GLUCOMTR-MCNC: 120 MG/DL (ref 70–99)

## 2021-06-19 PROCEDURE — 250N000012 HC RX MED GY IP 250 OP 636 PS 637: Performed by: PHYSICIAN ASSISTANT

## 2021-06-19 PROCEDURE — 250N000013 HC RX MED GY IP 250 OP 250 PS 637: Performed by: PHYSICIAN ASSISTANT

## 2021-06-19 PROCEDURE — 250N000011 HC RX IP 250 OP 636: Performed by: PHYSICIAN ASSISTANT

## 2021-06-19 PROCEDURE — 250N000013 HC RX MED GY IP 250 OP 250 PS 637: Performed by: SURGERY

## 2021-06-19 PROCEDURE — 999N001017 HC STATISTIC GLUCOSE BY METER IP

## 2021-06-19 PROCEDURE — 250N000013 HC RX MED GY IP 250 OP 250 PS 637: Performed by: STUDENT IN AN ORGANIZED HEALTH CARE EDUCATION/TRAINING PROGRAM

## 2021-06-19 RX ADMIN — GABAPENTIN 900 MG: 300 CAPSULE ORAL at 08:40

## 2021-06-19 RX ADMIN — HYDROMORPHONE HYDROCHLORIDE 2 MG: 2 TABLET ORAL at 08:47

## 2021-06-19 RX ADMIN — ROSUVASTATIN CALCIUM 10 MG: 10 TABLET, FILM COATED ORAL at 08:40

## 2021-06-19 RX ADMIN — HYDROMORPHONE HYDROCHLORIDE 2 MG: 2 TABLET ORAL at 05:49

## 2021-06-19 RX ADMIN — ACETAMINOPHEN 975 MG: 325 TABLET, FILM COATED ORAL at 10:21

## 2021-06-19 RX ADMIN — PREDNISONE 20 MG: 20 TABLET ORAL at 08:40

## 2021-06-19 RX ADMIN — ACETAMINOPHEN 975 MG: 325 TABLET, FILM COATED ORAL at 05:49

## 2021-06-19 RX ADMIN — VENLAFAXINE HYDROCHLORIDE 225 MG: 225 TABLET, EXTENDED RELEASE ORAL at 08:40

## 2021-06-19 RX ADMIN — HYDROXYZINE HYDROCHLORIDE 25 MG: 25 TABLET, FILM COATED ORAL at 01:02

## 2021-06-19 RX ADMIN — AMLODIPINE BESYLATE 5 MG: 5 TABLET ORAL at 08:40

## 2021-06-19 RX ADMIN — ENOXAPARIN SODIUM 40 MG: 40 INJECTION SUBCUTANEOUS at 10:21

## 2021-06-19 RX ADMIN — PANTOPRAZOLE SODIUM 40 MG: 40 TABLET, DELAYED RELEASE ORAL at 08:40

## 2021-06-19 ASSESSMENT — ACTIVITIES OF DAILY LIVING (ADL)
ADLS_ACUITY_SCORE: 13

## 2021-06-19 NOTE — PROGRESS NOTES
Care Management Discharge Note    Discharge Date: 06/19/21     Discharge Disposition: Home    Discharge Services: Home Care    Discharge DME: N/A    Discharge Transportation: Car, family or friend     Private pay costs discussed: Not applicable    PAS Confirmation Code: N/A  Patient/family educated on Medicare website which has current facility and service quality ratings: N/A    Education Provided on the Discharge Plan: Yes  Persons Notified of Discharge Plans: Patient, bedside RN, SW, [facility]  Patient/Family in Agreement with the Plan: Yes    Handoff Referral Completed: Yes    Additional Information:    Per team, patient will discharge home today.    [facility] Care updated.    Michelle Phan, RN, BSN, PHN  Care Coordinator   P: [phone], [facility]

## 2021-06-19 NOTE — DISCHARGE SUMMARY
Assumed care from [id number].    Lovenox and ileostomy teaching reiterated. Patient acknowledges understanding. Discharge medications picked up from pharmacy. Pt plans to follow up for appointments. Pt discharging home w/ family.

## 2021-06-19 NOTE — PROGRESS NOTES
Assumed cares of pt @ 1900. Pt is AxO. VSS on RA. Pain managed with Tylenol and Dilaudid. Pt having increased anxiety with situation and using PRN Atarax. Pt is up ad miriam, ambulated halls x1. Ileostomy with adequate output and pt is able to empty bag with a little encouragement to help relieve some of her anxiety and then she did well. Voiding. Tolerating diet. Denies nausea.  BG checked at bedtime and coverage given per scale. Plans for discharge to home today. Continue POC.

## 2021-06-21 ENCOUNTER — MEDICAL CORRESPONDENCE (OUTPATIENT)
Dept: HEALTH INFORMATION MANAGEMENT | Facility: CLINIC | Age: 46
End: 2021-06-21

## 2021-06-21 ENCOUNTER — PATIENT OUTREACH (OUTPATIENT)
Dept: CARE COORDINATION | Facility: CLINIC | Age: 46
End: 2021-06-21

## 2021-06-21 ENCOUNTER — PATIENT OUTREACH (OUTPATIENT)
Dept: SURGERY | Facility: CLINIC | Age: 46
End: 2021-06-21

## 2021-06-21 LAB — COPATH REPORT: NORMAL

## 2021-06-21 NOTE — PROGRESS NOTES
Prior Authorization Approval    Enoxaparin Sodium 40mg/0.4mL syr  Date Initiated: 6/18/2021  Date Completed: 6/18/2021  Prior Auth Type: Quantity Limit                Status: Approved    Effective Date: 03/18/2021 - 07/18/2021  Copay: 0.00     Filling Pharmacy: [facility] - [place], [place] - [address] [place] [address]    Insurance: [facility] - Phone [phone] Fax [phone]  ID: [id number]  Case Number: [id number] / [id number]   Submitted Via: Alyse Hansen  [facility] Liaison  Ph: [phone] Pager: [phone]

## 2021-06-21 NOTE — PROGRESS NOTES
Clinic Care Coordination Contact  [facility]/Voicemail     Clinical Data: Care Coordination Outreach  Outreach attempted x 1.  Left message on patient's voicemail with call back information and requested return call.  Plan: CHW Care Coordinator will try to reach patient again in 1-2 business days.    Yesika MCCORMICK  Community Health Worker  [facility] Care Coordination  [facility]  [email or web address]   Office: [phone]

## 2021-06-21 NOTE — PROGRESS NOTES
Post Op Note     Called to check on patient postoperatively after hospital discharge.       Patient is s/p TAC with MIKKI with Dr. Ram for chrons.   Admitted 6/16/2021 and discharged on 6/19/2021.      Pain is not well controlled with tylenol, gabapentin, dilaudid. She feels achy today. Yesterday she didn't take that much dilaudid because she was feeling good so she feels like she is catching up.     Patient is eating and drinking normally. Patient is on a low fiber diet.  Encouraged patient to drink 8-10 glasses of water a day.     Patient has been recording daily ileostomy outputs. Patient has recorded outputs these were; 600cc for 24 hours. Advised patient to contact the clinic if outputs are more than 1000 mL daily for two consecutive days or more than 2000 mL in one day or less than 500 mL for two consecutive days. At night she has a lot of output which caused a blow out the first night.     Patient denies taking medication for bowel function.     Patient reports pouching difficulties     If any pouching difficulties reported, please contact [facility] nurse  to schedule appointment.     Patient is voiding normally and urine is light in color.    Patient is set up with home care. Patient reports being contacted by the home care service. Patient has been seen by someone from home care.     Patient Denies nausea and vomiting.     Patient Denies any fevers or chills.    Patient's incision is C/D/I. Patient reminded NOT to remove any dressings over their incisions.     Patient is on a activity restriction. Lifting 10 pounds for 6 weeks.     Patient Denies needing any forms completed.     Follow up is set up with Jena Thompson NP on 6/30/2021 and with Dr. Ram on 7/22/2021.   Encouraged the patient to contact the clinic in the meantime with any fevers, chills, nausea, vomiting, increased colostomy output, no colostomy output, dizziness, lightheadedness, uncontrolled pain, changes to the incisions, or  with any questions or concerns.    Patient's questions were answered to their stated satisfaction and they are in agreement with this plan.    Annamarie -[phone]  [facility] Surgery Clinic  [facility]

## 2021-06-22 ENCOUNTER — PATIENT OUTREACH (OUTPATIENT)
Dept: NURSING | Facility: CLINIC | Age: 46
End: 2021-06-22
Payer: COMMERCIAL

## 2021-06-22 NOTE — PROGRESS NOTES
Clinic Care Coordination Contact  Community Health Worker Initial Outreach    CHW Initial Information Gathering:  Referral Source: IP Handoff  Current living arrangement:: I live in a private home with family  Type of residence:: Private home - stairs  Community Resources: Home Care  Supplies used at home:: Diabetic Supplies  Equipment Currently Used at Home: (CPAP)  Informal Support system:: Family  No PCP office visit in Past Year: Yes  Transportation means:: Regular car  CHW Additional Questions  If ED/Hospital discharge, follow-up appointment scheduled as recommended?: Yes  Medication changes made following ED/Hospital discharge?: Yes, patient to be scheduled with CC RN/SW within approx 1 business day  MyChart active?: Yes  Patient sent Social Determinants of Health questionnaire?: Yes    Patient accepts CC: Yes. Patient scheduled for assessment with RN CC on 6/24 at 11am. Patient noted desire to discuss future RN/SW support and resources needed.     The Clinic Community Health Worker spoke with the patient today to discuss possible Clinic Care Coordination enrollment. The service was described to the patient and immediate needs were discussed. The patient agreed to enrollment and an assessment was scheduled. The patient was provided with contact information for the clinic CHW.             Assessment date: 6/24 @11am    Yesika MCCORMICK  Community Health Worker  [facility]  Clinic Care Coordination  [facility]  [email or web address]  [email or web address]   Office: [phone]

## 2021-06-23 ENCOUNTER — TELEPHONE (OUTPATIENT)
Dept: SURGERY | Facility: CLINIC | Age: 46
End: 2021-06-23

## 2021-06-23 DIAGNOSIS — E09.9 STEROID-INDUCED DIABETES MELLITUS, SEQUELA (H): ICD-10-CM

## 2021-06-23 DIAGNOSIS — T38.0X5S STEROID-INDUCED DIABETES MELLITUS, SEQUELA (H): ICD-10-CM

## 2021-06-23 DIAGNOSIS — G89.29 OTHER CHRONIC PAIN: ICD-10-CM

## 2021-06-23 DIAGNOSIS — E55.9 VITAMIN D DEFICIENCY: Primary | ICD-10-CM

## 2021-06-23 RX ORDER — GABAPENTIN 300 MG/1
CAPSULE ORAL
Qty: 360 CAPSULE | Refills: 0 | Status: SHIPPED | OUTPATIENT
Start: 2021-06-23 | End: 2021-07-15

## 2021-06-23 NOTE — TELEPHONE ENCOUNTER
I told Doretha that there are no restrictions from our standpoint and she should make an appointment with her dentist.

## 2021-06-23 NOTE — TELEPHONE ENCOUNTER
[facility] Call Center    Phone Message    May a detailed message be left on voicemail: yes     Reason for Call: Other: Patient called as she had surgery on 6/15/21.  Patient now has a tooth that that broke, is impacted, swollen, and needs to be removed ASAP.  Patient does not know what do to, due to the surgery.  Please call patient back ASAP.     Action Taken: Message routed to:  [facility] ([facility]): [facility] Adult [facility]    Travel Screening: Not Applicable

## 2021-06-24 ENCOUNTER — PATIENT OUTREACH (OUTPATIENT)
Dept: CARE COORDINATION | Facility: CLINIC | Age: 46
End: 2021-06-24

## 2021-06-24 ASSESSMENT — ACTIVITIES OF DAILY LIVING (ADL): DEPENDENT_IADLS:: INDEPENDENT

## 2021-06-24 NOTE — PROGRESS NOTES
Clinic Care Coordination Contact    Clinic Care Coordination Contact  OUTREACH    Referral Information:  Referral Source: IP Handoff       Chief Complaint   Patient presents with     Clinic Care Coordination - Initial     RN CC     Pt scheduled for 11am RN Care Coordinator assessment, following recent hospitalization at [facility] (6/15/21 to 6/19/21) for ulcerative colitis, s/p lap total abdominal colectomy with end ileostomy on 6/15/21.      Upon reaching pt she states she is unable to talk long as she's getting ready to leave for a dental appt at noon, to have a tooth pulled.  Reports a couple of days ago her tooth cracked/fractured and needs it pulled.  States pain wise from her surgery she doesn't have much, the pain medication is helping; however, the pain medication isn't helping with her tooth pain.        Pain  Pain (GOAL):: Yes  Type: Acute (<3mo)  Location of chronic pain:: tooth pain from fractured/cracked tooth  Radiating: No  Progression: Constant  Alleviating Factors: Pain Medication         Plan:    RN Care Coordinator assessment rescheduled to tomorrow, 6/25/21 at 1:00pm, per pt's request.      Polly Huerta RN Clinic Care Coordination

## 2021-06-25 ENCOUNTER — PATIENT OUTREACH (OUTPATIENT)
Dept: CARE COORDINATION | Facility: CLINIC | Age: 46
End: 2021-06-25
Attending: SURGERY

## 2021-06-25 SDOH — SOCIAL STABILITY: SOCIAL INSECURITY
WITHIN THE LAST YEAR, HAVE YOU BEEN KICKED, HIT, SLAPPED, OR OTHERWISE PHYSICALLY HURT BY YOUR PARTNER OR EX-PARTNER?: NOT ASKED

## 2021-06-25 SDOH — HEALTH STABILITY: PHYSICAL HEALTH: ON AVERAGE, HOW MANY DAYS PER WEEK DO YOU ENGAGE IN MODERATE TO STRENUOUS EXERCISE (LIKE A BRISK WALK)?: NOT ASKED

## 2021-06-25 SDOH — SOCIAL STABILITY: SOCIAL NETWORK: ARE YOU MARRIED, WIDOWED, DIVORCED, SEPARATED, NEVER MARRIED, OR LIVING WITH A PARTNER?: DIVORCED

## 2021-06-25 SDOH — SOCIAL STABILITY: SOCIAL NETWORK: IN A TYPICAL WEEK, HOW MANY TIMES DO YOU TALK ON THE PHONE WITH FAMILY, FRIENDS, OR NEIGHBORS?: NOT ASKED

## 2021-06-25 SDOH — SOCIAL STABILITY: SOCIAL INSECURITY
WITHIN THE LAST YEAR, HAVE YOU BEEN HUMILIATED OR EMOTIONALLY ABUSED IN OTHER WAYS BY YOUR PARTNER OR EX-PARTNER?: NOT ASKED

## 2021-06-25 SDOH — SOCIAL STABILITY: SOCIAL INSECURITY
WITHIN THE LAST YEAR, HAVE TO BEEN RAPED OR FORCED TO HAVE ANY KIND OF SEXUAL ACTIVITY BY YOUR PARTNER OR EX-PARTNER?: NOT ASKED

## 2021-06-25 SDOH — SOCIAL STABILITY: SOCIAL NETWORK: HOW OFTEN DO YOU ATTEND CHURCH OR RELIGIOUS SERVICES?: NOT ASKED

## 2021-06-25 SDOH — HEALTH STABILITY: PHYSICAL HEALTH: ON AVERAGE, HOW MANY MINUTES DO YOU ENGAGE IN EXERCISE AT THIS LEVEL?: NOT ASKED

## 2021-06-25 SDOH — SOCIAL STABILITY: SOCIAL NETWORK
DO YOU BELONG TO ANY CLUBS OR ORGANIZATIONS SUCH AS CHURCH GROUPS UNIONS, FRATERNAL OR ATHLETIC GROUPS, OR SCHOOL GROUPS?: NOT ASKED

## 2021-06-25 SDOH — HEALTH STABILITY: MENTAL HEALTH
STRESS IS WHEN SOMEONE FEELS TENSE, NERVOUS, ANXIOUS, OR CAN'T SLEEP AT NIGHT BECAUSE THEIR MIND IS TROUBLED. HOW STRESSED ARE YOU?: VERY MUCH

## 2021-06-25 SDOH — SOCIAL STABILITY: SOCIAL NETWORK: HOW OFTEN DO YOU ATTENT MEETINGS OF THE CLUB OR ORGANIZATION YOU BELONG TO?: NOT ASKED

## 2021-06-25 SDOH — SOCIAL STABILITY: SOCIAL INSECURITY: WITHIN THE LAST YEAR, HAVE YOU BEEN AFRAID OF YOUR PARTNER OR EX-PARTNER?: NOT ASKED

## 2021-06-25 SDOH — SOCIAL STABILITY: SOCIAL NETWORK: HOW OFTEN DO YOU GET TOGETHER WITH FRIENDS OR RELATIVES?: NOT ASKED

## 2021-06-25 ASSESSMENT — ACTIVITIES OF DAILY LIVING (ADL): DEPENDENT_IADLS:: CLEANING;TRANSPORTATION

## 2021-06-25 NOTE — TELEPHONE ENCOUNTER
Routing refill request to provider for review/approval because: Medication is reported/historical

## 2021-06-25 NOTE — LETTER
[facility] CARE COORDINATION  [facility]   [address] [place] [address]      June 29, 2021    Doretha Fernnadez  [address]  [place] [facility] [address]      Dear Doretha,    I am a clinic care coordinator who works with Anna Naidu MD at [facility].  I wanted to thank you for spending the time to talk with me.  Below is a description of clinic care coordination and how I can further assist you.      The clinic care coordination team is made up of a registered nurse,  and community health worker who understand the health care system. The goal of clinic care coordination is to help you manage your health and improve access to the health care system in the most efficient manner. The team can assist you in meeting your health care goals by providing education, coordinating services, strengthening the communication among your providers and supporting you with any resource needs.    Please feel free to contact me at [phone], or our Community Health WorkerYesika at [phone] with any questions or concerns. We are focused on providing you with the highest-quality healthcare experience possible and that all starts with you.     Sincerely,     Polly Huerta RN Clinic Care Coordination     Enclosed: I have enclosed a copy of the Complex Care Plan. This has helpful information and goals that we have talked about. Please keep this in an easy to access place to use as needed.

## 2021-06-25 NOTE — LETTER
[facility]  Complex Care Plan  About Me:    Patient Name:  Doretha Fernandez    YOB: 1975  Age:         46 year old   [facility] MRN:    [id number] Telephone Information:  Home Phone [phone]   Mobile [phone]       Address:  [address] [address] Email address:  [email or web address]      Emergency Contact(s)    Name Relationship Lgl Grd Work Phone Home Phone Mobile Phone   1. RONNIE MONTANEZ Sister No   [phone]   2. ROSETTE MILLS Father No [phone]   3. JUDY GRANT Stepparent No [phone]           Primary language:  English     needed? No   [facility]:  [phone] op. 1  Other communication barriers: None  Preferred Method of Communication:  Mail  Current living arrangement: I live in a private home with family  Mobility Status/ Medical Equipment: Independent    Health Maintenance  Health Maintenance Reviewed: Due/Overdue   Health Maintenance Due   Topic Date Due     DIABETIC FOOT EXAM  Never done     URINE DRUG SCREEN  Never done     Pneumococcal Vaccine: Pediatrics (0 to 5 Years) and At-Risk Patients (6 to 64 Years) (1 of 4 - PCV13) Never done     HIV SCREENING  Never done     HEPATITIS B IMMUNIZATION (1 of 3 - Risk 3-dose series) Never done     PREVENTIVE CARE VISIT  03/02/2016     MAMMO SCREENING  10/04/2018     EYE EXAM  10/12/2018     ASTHMA ACTION PLAN  01/28/2020     COLPOSCOPY  Never done     HPV TEST  12/13/2020     PAP  12/13/2020     MICROALBUMIN  06/04/2021        My Access Plan  Medical Emergency 911   Primary Clinic Line   -     24 Hour Appointment Line [phone] or  [phone]) (toll-free)   24 Hour Nurse Line [phone] (toll-free)   [facility], [phone]   [facility]  [phone]   Preferred Pharmacy [facility] - [place], [place] - [address]     Behavioral Health Crisis Line The National Suicide Prevention Lifeline at 1-296.575.2794 or 911         My Care Team Members  Patient Care Team       Relationship Specialty Notifications Start End    Anna Naidu MD PCP - General Family Practice  2/25/20     Phone: [phone] Pager: [phone] Fax: [phone] [address] [facility] [address]    Cheo Norton MD MD Ophthalmology  8/24/17     Phone: [phone] Fax: [phone]         [address] [facility] [address]    Laura Garcia, RN Nurse Coordinator Surgical Oncology Admissions 10/13/17     Phone: [phone] Pager: [phone]        Kathy Richards MD MD Hematology & Oncology Abnormal results only, Admissions 11/17/17     Phone: [phone] Fax: [phone]         [address] [facility]  [facility] [address]    Antonia Nair MD MD Radiation Oncology  3/20/18     Phone: [phone] Fax: [phone] [address] [address]    Laura Rene, RN Diabetes Educator Diabetes Education  2/7/20     Phone: [phone]         Melina Jung MD Assigned Cancer Care Provider   10/23/20     Phone: [phone] Fax: [phone]         [address] [facility] [address]    Chloé Burr MD Assigned OBGYN Provider   10/23/20     Phone: [phone] Fax: [phone]         [address] [place] [address]    Lowell Bullock MD Assigned Surgical Provider   10/23/20     Phone: [phone] Pager: [phone] Fax: [phone] [address] [facility] [address]    Chris Medrano MD Assigned Musculoskeletal Provider   10/23/20     Phone: [phone] Fax: [phone]         [address] [facility] [address]    Anna Naidu MD Assigned PCP   2/21/21     Phone: [phone] Pager: [phone] Fax: [phone] [address] [facility] [place] [address]    Chloé Ignacio, TEJINDER Specialty  Care Coordinator Gastroenterology  5/7/21     Phone: [phone]         Zeferino Sanchez PA-C Assigned Heart and Vascular Provider   5/23/21     Phone: [phone] Fax: [phone]         [address] [facility] [address]    Polly Huerta, RN Lead Care Coordinator Primary Care - [facility] Admissions 6/28/21     Phone: [phone]         Yesika Fong Community Health Worker Primary Care - [facility] Admissions 6/28/21     Phone: [phone]         Gisel Marte, RN Lead Care Coordinator Primary Care - [facility] Admissions 6/28/21     Phone: [phone]         Annamarie Ruiz Financial Resource Worker Primary Care - [facility]  6/29/21     P. [phone]  F. [phone]            My Care Plans  Self Management and Treatment Plan  Goals and (Comments)  Goals        General    Financial Wellbeing (pt-stated)     Notes - Note edited  6/28/2021  5:23 PM by Polly Huerta, RN    Goal Statement: I will apply for [employer] Benefits within the next 1-2 weeks if I am eligible.   Date Goal Set:  6/28/2021   Barriers:  Having difficulty obtaining LTD through employer, recent abdominal surgery/hospitalization  Strengths:  Motivated, engaged in care coordination  Date to Achieve By:  7/12/2021  Patient expressed understanding of goal:  Yes  Action steps to achieve this goal:  1.  I understand a referral was placed to the Financial Resource Worker, I will receive a call within the next 3 business days.    2.  I understand the financial worker will make two attempts to call me. If I still need help with this goal, I will connect with my Community Health Worker Yesika at [phone].      Healthy Eating (pt-stated)     Notes - Note created  6/29/2021  5:11 PM by Polly Huerta, RN    Goal Statement: I would like to see a nutritionist to learn about a low fiber diet and how to incorporate it with my diabetic diet, and participate in the nutrition study offered through the [facility], as informed to me during my recent hospitalization,  within the next month.  Date Goal set:  6/28/2021  Barriers:  New ileostomy, recent abdominal surgery, complex medical history  Strengths:  Motivated, engaged with home care  Date to Achieve By:  7/28/2021  Patient expressed understanding of goal:  Yes  Action steps to achieve this goal:  1.  I will continue working with my home care RN on dietary education and learning foods that are safe to eat with my new ileostomy and diabetes  2.  I will talk with my Colon and Rectal Surgery team about seeing a nutritionist and participating in the nutrition study through the [facility], at my upcoming appt on 6/30/21  3.  I will talk with my primary care provider about a referral to a nutritionist, if needed   4.  I will ask my care team for additional resources if needed      Medical (pt-stated)     Notes - Note edited  6/28/2021  5:23 PM by Polly Huerta, RN    Goal Statement:  I want to find an oral surgeon who can remove my cracked/fractured tooth within in the next 1-2 weeks, or ASAP.  Date Goal set:  6/28/2021   Barriers:  [place] dental [place], [place] network [place]  Strengths:  motivated, strong self-advocate, engaged in care coordination  Date to Achieve By:  7/12/21  Patient expressed understanding of goal:  Yes  Action steps to achieve this goal:  1.  RN Care Coordinator will send information on dental resources to pt via [employer].  2.  I will review dental information from RN Care Coordinator and follow up with dental provider(s) directly to schedule appt ASAP  3.  I will let my care team know if I need additional resources       Medical (pt-stated)     Notes - Note edited  6/28/2021  5:22 PM by Polly Huerta, RN    Goal statement: I will schedule and attend a follow up appointment with my PCP within the next 1-2 weeks, per my hospital discharge instructions.  Date goal set:  6/28/2021   Barriers: recent hospitalization for total abdominal colectomy with  end ileostomy, multiple provider follow up appts  Strengths: motivated, self-advocate, engaged in self-care  Date to achieve by:  7/12/2021  Patient expressed understanding of goal: Yes  Action steps to achieve this goal:  1.  I will schedule a hospital follow up appointment with my primary care provider, Dr. Anna Naidu, via zintin ASAP  2.  I will attend my appointment with PCP as scheduled.  3.  I will talk with my provider about a plan for my diabetes regimen as I wean down/off steroids, and discuss possible referral to an Endocrinologist and or Certified Diabetic Educator (CDE).  4.  I will follow up with my care team if I need assistance or additional resources         Action Plans on File:      Advance Care Plans/Directives Type:   Type Advanced Care Plans/Directives: Advanced Directive - On File      My Medical and Care Information  Problem List   Patient Active Problem List   Diagnosis     CARDIOVASCULAR SCREENING; LDL GOAL LESS THAN 160     DUB (dysfunctional uterine bleeding)     Anxiety state     Esophageal reflux     Mild major depression (H)     Mild intermittent asthma without complication     Vision changes     Prothrombin mutation (H)     Metastatic malignant melanoma (H)     Malignant melanoma of left upper extremity including shoulder (H)     Functional diarrhea     Colitis     Rectal bleeding     Bilateral leg cramps     Rash and nonspecific skin eruption     Other chronic pain     Immunosuppressed status (H)     Ulcerative colitis with complication, unspecified location (H)     Morbid obesity (H)     Cervical high risk HPV (human papillomavirus) test positive     STORMY (obstructive sleep apnea)     Secondary lymphedema     Chronic neutrophilia     Diabetes mellitus, iatrogenic (H)     High risk HPV infection     Abdominal pain     Anemia     Candidiasis of mouth     Hypocalcemia     Malaise     Menstrual irregularity     Arthritis, rheumatoid (H)     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 difficile colitis     Ulcerative colitis without complications, unspecified location (H)            Current Medications and Allergies:  Current Outpatient Medications   Medication Sig Dispense Refill     acetaminophen (TYLENOL) 325 MG tablet Take 3 tablets (975 mg) by mouth every 8 hours As scheduled for the next 7-10 days, then decrease both dose & frequency to as needed there after. 90 tablet 0     amLODIPine (NORVASC) 5 MG tablet Take 1 tablet (5 mg) by mouth daily Needs follow up visit before next refill due 30 tablet 0     blood glucose (NO BRAND SPECIFIED) lancets standard Use to test blood sugar 3 times daily or as directed. 1 each 0     Calcium Carb-Cholecalciferol 600-800 MG-UNIT TABS Take 800 mg by mouth every morning (before breakfast) 90 tablet 3     calcium carbonate (TUMS) 500 MG chewable tablet Take 1 tablet (500 mg) by mouth 3 times daily as needed for heartburn 30 tablet 0     enoxaparin ANTICOAGULANT (LOVENOX) 40 MG/0.4ML syringe Inject 0.4 mLs (40 mg) Subcutaneous every 12 hours for 26 days 20.8 mL 0     gabapentin (NEURONTIN) 300 MG capsule TAKE THREE CAPSULES BY MOUTH FOUR TIMES A  capsule 0     HYDROmorphone (DILAUDID) 2 MG tablet Take 1 tablet (2 mg) by mouth every 6 hours as needed for moderate to severe pain 10 tablet 0     hydrOXYzine (ATARAX) 25 MG tablet Take 1 tablet (25 mg) by mouth every 6 hours as needed for anxiety 40 tablet 0     insulin glargine (LANTUS PEN) 100 UNIT/ML pen Inject 4 Units Subcutaneous At Bedtime       insulin lispro (HUMALOG KWIKPEN) 100 UNIT/ML (1 unit dial) KWIKPEN Inject 1-7 Units Subcutaneous 4 times daily (with meals and nightly) Per the insulin sliding scale provided in the discharge summary. 3 mL 2     metFORMIN (GLUCOPHAGE-XR) 500 MG 24 hr tablet  Take 1,000 mg by mouth Daily with breakfast.       oxyCODONE (ROXICODONE) 5 MG tablet Take 5 mg by mouth every 4 hours as needed for moderate to severe pain       pantoprazole (PROTONIX) 40 MG EC tablet Take 1 tablet (40 mg) by mouth every morning (before breakfast) Needs follow up visit before next refill due 30 tablet 0     predniSONE (DELTASONE) 20 MG tablet Take 1 tablet (20 mg) by mouth daily Prednisone 20mg daily x5 days, then decrease to prednisone 10mg daily x5 days, then decrease to prednisone 5mg daily x5 days, then decrease to prednisone 5mg every other day for 5 days, then off.       rosuvastatin (CRESTOR) 10 MG tablet Take 1 tablet (10 mg) by mouth daily 90 tablet 3     simethicone (MYLICON) 80 MG chewable tablet Take 1-2 tablets ( mg) by mouth 4 times daily as needed for cramping 120 tablet 0     sulfamethoxazole-trimethoprim (BACTRIM) 400-80 MG tablet Take 1 tablet by mouth daily 120 tablet 0     venlafaxine (EFFEXOR-ER) 225 MG 24 hr tablet TAKE ONE TABLET BY MOUTH ONCE DAILY 90 tablet 1     VITAMIN D3 25 MCG (1000 UT) tablet TAKE THREE TABLETS BY MOUTH ONCE DAILY 300 tablet 1     VITRON-C  MG TABS tablet Take 1 tablet by mouth daily 30 tablet 1     Alcohol Swabs (ALCOHOL PREP) 70 % PADS 1 applicator 3 times daily 100 each 0     blood glucose (NO BRAND SPECIFIED) test strip Use to test blood sugar 3 times daily or as directed. 200 strip 0        Care Coordination Start Date: 6/28/2021   Frequency of Care Coordination: 2 weeks   Form Last Updated: 06/29/2021

## 2021-06-25 NOTE — PROGRESS NOTES
Clinic Care Coordination Contact    Clinic Care Coordination Contact  OUTREACH    Referral Information:  Referral Source: IP Handoff    Primary Diagnosis: GI Disorders(Ulcerative colitis, s/p total abdominal colectomy with end ileostomy)    Chief Complaint   Patient presents with     Clinic Care Coordination - Initial     RN CC     Clinic Care Coordination - Post Hospital        [place] Utilization:   Clinic Utilization  Difficulty keeping appointments:: No  Compliance Concerns: No  No-Show Concerns: No  No PCP office visit in Past Year: Yes  Utilization    Last refreshed: 6/29/2021  2:53 PM: Hospital Admissions 5           Last refreshed: 6/29/2021  2:53 PM: ED Visits 6           Last refreshed: 6/29/2021  2:53 PM: No Show Count (past year) 2              Current as of: 6/29/2021  2:53 PM            Clinical Concerns:  Current Medical Concerns:    Patient Active Problem List   Diagnosis     CARDIOVASCULAR SCREENING; LDL GOAL LESS THAN 160     DUB (dysfunctional uterine bleeding)     Anxiety state     Esophageal reflux     Mild major depression (H)     Mild intermittent asthma without complication     Vision changes     Prothrombin mutation (H)     Metastatic malignant melanoma (H)     Malignant melanoma of left upper extremity including shoulder (H)     Functional diarrhea     Colitis     Rectal bleeding     Bilateral leg cramps     Rash and nonspecific skin eruption     Other chronic pain     Immunosuppressed status (H)     Ulcerative colitis with complication, unspecified location (H)     Morbid obesity (H)     Cervical high risk HPV (human papillomavirus) test positive     STORMY (obstructive sleep apnea)     Secondary lymphedema     Chronic neutrophilia     Diabetes mellitus, iatrogenic (H)     High risk HPV infection     Abdominal pain     Anemia     Candidiasis of mouth     Hypocalcemia     Malaise     Menstrual irregularity     Arthritis, rheumatoid (H)     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 difficile colitis     Ulcerative colitis without complications, unspecified location (H)      Patient was hospitalized at [facility] from 6/15/21 to 6/19/21 with diagnosis of ulcerative colitis without complications, refractory ulcerative colitis, and underwent planned laparoscopic total abdominal colectomy with end ileostomy procedure 6/15/21.           \"Brief History of Illness:   47 y/o F, PMH of melanoma unknown primary (2017), s/p left axillary LN dissection (2017, Dr. Cool) in remission, history of adjuvant nivolumab induced colitis in 2018 treated with remicade (x1 at [facility]) and Entyvio, h/o c.diff (recent bout, started PO vanc 4/7), seronegative RA on prednisone & tocilizumab, steroid induced DM, history of cushings, chronic pain, relatively new diagnosis of UC, received Entyvio x2 (most recent infusions on 5/12, 5/26), mostly steroid dependent x3 years.  Pt has had recent readmissions for hematochezia, acute on chronic abdominal & rectal pain, nausea for refractory UC and concerns regarding risks associated with melanoma recurrence with using standard UC maintenance medications.  Pt underwent total abdominal colectomy and end ileostomy on 6/15/2021.\"        Pt states \"good\" output from her ileostomy, putting out 650-800 ml a day from her ileostomy.     Reviewed some dietary concerns with home care nurse today - which she found was helpful.  \"Living off of white bread and peanut butter, eggs & toast.\"  States while in the hospital, she [facility] \"[facility] asking if she wanted to participate in a nutrition study with her recent ileostomy, and that it was free.  She agreed to participate, but hasn't heard anything further.  Today she reports having just received a call from [facility] at [facility] " "asking if she wanted to see a nutritionist but that it would be $499.00 for a consult, however, it was separate from the \"nutrition study\" and the person she spoke with didn't know what she was referring to with regards to the nutrition study.  RN Care Coordinator reviewed chart but unable to locate any notes regarding discussion about a nutrition study.  Pt plans to discuss with CRS and GI at her upcoming follow up appts, to see if they have any additional information on this.    In process of weaning off prednisone - today decreased her dose from 20 mg daily to 10 mg a day - concerning as she's been on prednisone for 3 years.    Pain 7/10 now, due for meds - dilaudid, has 2 tabs left out of 10, had been taking every 4 hours based on the old rx bottle she had at home, and now almost out.  RN Care Coordinator encouraged pt to call CRS ASAP to request refill before the weekend, as narcotics are not filled on the weekend.       **RN unable to complete full assessment/goals as pt had to end call to assist her daughter, and requested RN Care Coordinator call her back in a little bit.  RN Care Coordinator attempted call back, left voicemail with RN Care Coordinator's contact information for pt to call back today, before 4:30pm if able to continue assessment, otherwise RN Care Coordinator will call pt back on Monday to complete assessment/goals for CCC, also left reminder for pt to call CRS ASAP to request a refill of her pain medication before the weekend.      =====================================    6/28/21 Addendum/Continuation of assessment from 6/25/21:      Pt reports her checking her BG levels QID, and states her readings have been \"going down,\" averaging 172 the last week.  States sometimes not even needing insulin with meals as her BG does not warrant it.  Doing well on decreased steroid dose, but has had some increased joint pains, which she's aware is to be expected while weaning off prednisone.  States \"I can " "still walk normal, so I'm happy about that.\"        Current Behavioral Concerns: Anxiety - Pt reports seeing her therapist, Isaura Taylor, at [facility] on Tuesdays, and has appts scheduled every Tuesday at 11am through the end of July.  She is planning to and working on starting family therapy, but this is not available until September, to help with oldest daughter's mental health and her trouble with the law     Education Provided to patient:  Role of CCC, and reviewed resources to contact should she need assistance over the weekend/after-hours, and or develops any sx.     Pain  Pain (GOAL):: Yes  Type: Acute (<3mo)  Location of chronic pain:: tooth pain from fractured/cracked tooth, post-op abdominal pain  Pain - taking Tylenol, 1 oxycodone (5 mg tab) at 10:30am for pain 8-9/10. Ran out of Dilaudid and took first oxycodone this morning as she needed something for pain - feels the oxycodone provides better pain relief.  Rates her pain a 6/10 now \"combined\" between tooth and abdominal pain right around the stoma site, really tender today and band of pain from \"rib to rib, and a hill like lump\" along the side of her stoma, but no redness - home care nurse has looked at the area and said incisions looked good.   Radiating: No  Progression: Constant  Description of pain: Tender, Sharp  Chronic pain severity:: 6(Pt states pain is 6/10 combined between tooth and post-op abd pain)  Limitation of routine activities due to chronic pain:: Yes  Description: Unable to perform most daily activities (chores, hobbies, social activities, driving)(Limited activity/lifting restrictions due to recent abdominal surgery)  Alleviating Factors: Pain Medication, Rest, Heat  Aggravating Factors: Activity    Health Maintenance Reviewed: Due/Overdue   Health Maintenance Due   Topic Date Due     DIABETIC FOOT EXAM  Never done     URINE DRUG SCREEN  Never done     Pneumococcal Vaccine: Pediatrics (0 to 5 Years) and At-Risk Patients " "(6 to 64 Years) (1 of 4 - PCV13) Never done     HIV SCREENING  Never done     HEPATITIS B IMMUNIZATION (1 of 3 - Risk 3-dose series) Never done     PREVENTIVE CARE VISIT  03/02/2016     MAMMO SCREENING  10/04/2018     EYE EXAM  10/12/2018     ASTHMA ACTION PLAN  01/28/2020     COLPOSCOPY  Never done     HPV TEST  12/13/2020     PAP  12/13/2020     MICROALBUMIN  06/04/2021      Pt acknowledged need to schedule follow up visit with her OB/GYN \"because they saw something on my CT scan and said I needed to follow up with OB as an outpatient.\"  Pt will call and schedule OB follow up in the near future.    Clinical Pathway: None    Medication Management:  Reviewed medications with pt and she is taking all medications as prescribed.     Functional Status:  Dependent ADLs:: Independent  Dependent IADLs:: Cleaning, Transportation(sister or dad help with housecleaning)  Bed or wheelchair confined:: No  Mobility Status: Independent  Fallen 2 or more times in the past year?: No  Any fall with injury in the past year?: No    Living Situation:  Current living arrangement:: I live in a private home with family  Type of residence:: Private [place] [facility]""    Lifestyle & Psychosocial Needs:  Lifestyle     Physical activity     Days per week: Not on file     Minutes per session: Not on file     Stress: Very much     Social Needs     Financial resource strain: Very hard     Food insecurity     Worry: Never true     Inability: Never true     Transportation needs     Medical: No     Non-medical: No     Diet:: Low Fiber, Diabetic diet  Inadequate nutrition (GOAL):: No  Tube Feeding: No  Inadequate activity/exercise (GOAL):: No  Significant changes in sleep pattern (GOAL): No  Transportation means:: Regular car     Tenriism or spiritual beliefs that impact treatment:: No  Mental health DX:: Yes(depression, anxiety)  Mental health DX how managed:: Medication, Outpatient Counseling  Mental health management concern (GOAL):: No(Pt seeing " "therapist weekly through end )  Chemical Dependency Status: No Current Concerns  Chemical Dependency Management: (N/A)  Informal Support system:: Family, Other(Pt states she has family but they're not always supportive, and her therapist; however, overall not a lot of support)   Socioeconomic History     Marital status:      Spouse name: Not on file     Number of children: Not on file     Years of education: Not on file     Highest education level: Not on file   Relationships     Social connections     Talks on phone: Not on file     Gets together: Not on file     Attends Nondenominational service: Not on file     Active member of club or organization: Not on file     Attends meetings of clubs or organizations: Not on file     Relationship status:      Intimate partner violence     Fear of current or ex partner: Not on file     Emotionally abused: Not on file     Physically abused: Not on file     Forced sexual activity: Not on file     Tobacco Use     Smoking status: Former Smoker     Packs/day: 1.00     Years: 5.00     Pack years: 5.00     Quit date: 3/20/1998     Years since quittin.2     Smokeless tobacco: Never Used   Substance and Sexual Activity     Alcohol use: Yes     Comment: occ     Drug use: No     Sexual activity: Not Currently     Partners: Male     Birth control/protection: Surgical       Pt states she needs assistance with LTD paperwork - sent paperwork to CRS team on 21, hasn't heard back (see [employer] message dated 6/10/21 for details).  Pt reports having \"difficulty\" with obtaining LTD through her employer, hasn't gotten a pay check since the 1st week of April, \"I'm not even sure I still have a job at this point.\"  RN Care Coordinator completed FRW referral screening to see if she qualifies for other county benefits/assistance aside from EBT food stamps (per pt, both her daughters qualify for and receive the pandemic EBT cards).     RN Care Coordinator called and spoke with " Annamarie, RN with [facility] to inquire on LTD paperwork submitted by pt on 6/11/21 (see [employer] message dated 6/10/21 for further details).  Informed Annamarie pt needs paperwork completed for her LTD benefits ASAP, she's not received a pay check since the first week in April as the initial paperwork did not have all the necessary information for her LTD insurance to process.  Annamarie informed RN Care Coordinator paperwork was forwarded to Lou, their CMA, on 6/21/21 - Annamarie will follow up on the status of the paperwork and call pt directly with update.       Resources and Interventions:  Current Resources: Home Care    Skilled Home Care Services: Skilled Nursing - Home Care - RN (out today, 6/24/21) and will have ostomy nurse as well.  Pt states On Monday (6/28/21) will switch to a different brand of ostomy bag to see if that helps with the irritation around the stoma.  Has reviewed dietary education during home care visits, which pt states she's found very helpful.      - Diet concerns - Low fiber diet for 4-7 weeks, but conflicts with her diabetic diet - interested in talking with nutritionist, and participating in nutrition study as discussed with her in the hospital - pt currently working with home care RN on dietary education      Community Resources: Home Care, OP Mental Health, Financial/Insurance([place], [place] cards for her 2 daughters)  Supplies used at home:: Diabetic Supplies, Wound Care Supplies(CPAP)  Equipment Currently Used at Home: colostomy/ostomy supplies, glucometer(CPAP; illeostomy)  Employment Status: other (see comments)(Working on getting LTD through employer)    Advance Care Plan/Directive  Advanced Care Plans/Directives on file:: Yes  Type Advanced Care Plans/Directives: Advanced Directive - On File    Referrals Placed: Financial Services, Other (FRW screening completed; dental resources)     Goals:   Goals        General    Financial Wellbeing (pt-stated)     Notes - Note edited   6/28/2021  5:23 PM by Polly Huerta, RN    Goal Statement: I will apply for County Benefits within the next 1-2 weeks if I am eligible.   Date Goal Set:  6/28/2021   Barriers:  Having difficulty obtaining LTD through employer, recent abdominal surgery/hospitalization  Strengths:  Motivated, engaged in care coordination  Date to Achieve By:  7/12/2021  Patient expressed understanding of goal:  Yes  Action steps to achieve this goal:  1.  I understand a referral was placed to the Financial Resource Worker, I will receive a call within the next 3 business days.    2.  I understand the financial worker will make two attempts to call me. If I still need help with this goal, I will connect with my Community Health Worker Yesika at [phone].      Healthy Eating (pt-stated)     Notes - Note created  6/29/2021  5:11 PM by Polly Huerta, RN    Goal Statement: I would like to see a nutritionist to learn about a low fiber diet and how to incorporate it with my diabetic diet, and participate in the nutrition study offered through the [facility], as informed to me during my recent hospitalization, within the next month.  Date Goal set:  6/28/2021  Barriers:  New ileostomy, recent abdominal surgery, complex medical history  Strengths:  Motivated, engaged with home care  Date to Achieve By:  7/28/2021  Patient expressed understanding of goal:  Yes  Action steps to achieve this goal:  1.  I will continue working with my home care RN on dietary education and learning foods that are safe to eat with my new ileostomy and diabetes  2.  I will talk with my Colon and Rectal Surgery team about seeing a nutritionist and participating in the nutrition study through the [facility], at my upcoming appt on 6/30/21  3.  I will talk with my primary care provider about a referral to a nutritionist, if needed   4.  I will ask my care team for additional resources if needed      Medical (pt-stated)     Notes - Note edited  6/28/2021  5:23 PM by  Polly Huerta, RN    Goal Statement:  I want to find an oral surgeon who can remove my cracked/fractured tooth within in the next 1-2 weeks, or ASAP.  Date Goal set:  6/28/2021   Barriers:  limited dental options [place], no openings with [place] network oral surgeon until September  Strengths:  motivated, strong self-advocate, engaged in care coordination  Date to Achieve By:  7/12/21  Patient expressed understanding of goal:  Yes  Action steps to achieve this goal:  1.  RN Care Coordinator will send information on dental resources to pt via [employer].  2.  I will review dental information from RN Care Coordinator and follow up with dental provider(s) directly to schedule appt ASAP  3.  I will let my care team know if I need additional resources       Medical (pt-stated)     Notes - Note edited  6/28/2021  5:22 PM by Polly Huerta, RN    Goal statement: I will schedule and attend a follow up appointment with my PCP within the next 1-2 weeks, per my hospital discharge instructions.  Date goal set:  6/28/2021   Barriers: recent hospitalization for total abdominal colectomy with end ileostomy, multiple provider follow up appts  Strengths: motivated, self-advocate, engaged in self-care  Date to achieve by:  7/12/2021  Patient expressed understanding of goal: Yes  Action steps to achieve this goal:  1.  I will schedule a hospital follow up appointment with my primary care provider, Dr. Anna Naidu, via [employer] ASAP  2.  I will attend my appointment with PCP as scheduled.  3.  I will talk with my provider about a plan for my diabetes regimen as I wean down/off steroids, and discuss possible referral to an Endocrinologist and or Certified Diabetic Educator (CDE).  4.  I will follow up with my care team if I need assistance or additional resources            Patient/Caregiver understanding: The patient indicates understanding of the information discussed and agrees with the plan.      Outreach Frequency: 2  "weeks  Future Appointments              Tomorrow Jena Thompson APRN CNP [facility] Colon and Rectal Surgery [facility]    In 1 week Zeferino Sanchez PA-C [facility] Gastroenterology [facility]    In 2 weeks Anna Naidu MD [facility]    In 3 weeks Quinton Ram MD [facility] Colon and Rectal Surgery [facility]    In 1 month Lowell Bullock MD [facility]    In 1 month ROOM 3 [place]; [place] CANCER INFUSION NURSE [facility]          Plan:   - Pt will continue to work with home care RN on dietary educations/concerns.  Pt will continue low fiber diet as directed by surgeon for \"4-7 weeks.\"  She will discuss nutritionist referral with CRS team and or PCP at upcoming appt(s).    - Dental - needs help finding a provider who can pull her tooth through her insurance ([employer] [place]), has tried several who have been unable to, even declining her offer to pay out of pocket - RN Care Coordinator sent pt some dental resources via [email or web address].  Pt also mentioned having discussed concerns with home care RN, who was going to look into the [facility] to see if they could get her in sooner - pt will follow up with home care RN directly for update.    - Pt aware she needs to schedule an appt with her Rheumatologist, \"Dr. OG\" at [place] in the next 2-3 weeks, and will call to schedule this  - PCP appt in the next 1-2 weeks to discuss DM regimen as she weans down on her steroids, and discuss referral to Endocrine vs CDE, or both  - CRS appts already scheduled on 6/30/21 and 7/22/21 - pt will work with CRS team on wt loss and plan for undergoing 2nd surgery for a \"j pouch\" to route small intestine to rectum and remove need for ileostomy bag.   - Pt will keep previously scheduled appts with her therapist, Isaura Taylor, at [facility] - has appts every Tuesday at 11am through the end of July, and working on starting family therapy, but not available until September, to help with oldest daughter's mental health and trouble with the law  - Contact surgeon's office for refill of pain meds  - Pt will schedule appt with [facility] outpatient clinic in 2-4 weeks  - Pt will keep previously scheduled appt with GI on 7/12/21, for hospital follow up  - RN Care Coordinator will follow up with pt again in two weeks to reassess above follow up plan.        Polly Huerta RN Clinic Care Coordination

## 2021-06-28 ENCOUNTER — PATIENT OUTREACH (OUTPATIENT)
Dept: SURGERY | Facility: CLINIC | Age: 46
End: 2021-06-28

## 2021-06-28 RX ORDER — OXYCODONE HYDROCHLORIDE 5 MG/1
5 TABLET ORAL EVERY 4 HOURS PRN
COMMUNITY
End: 2021-07-15

## 2021-06-28 SDOH — ECONOMIC STABILITY: TRANSPORTATION INSECURITY
IN THE PAST 12 MONTHS, HAS THE LACK OF TRANSPORTATION KEPT YOU FROM MEDICAL APPOINTMENTS OR FROM GETTING MEDICATIONS?: NO

## 2021-06-28 SDOH — ECONOMIC STABILITY: TRANSPORTATION INSECURITY
IN THE PAST 12 MONTHS, HAS LACK OF TRANSPORTATION KEPT YOU FROM MEETINGS, WORK, OR FROM GETTING THINGS NEEDED FOR DAILY LIVING?: NO

## 2021-06-28 SDOH — ECONOMIC STABILITY: FOOD INSECURITY: WITHIN THE PAST 12 MONTHS, YOU WORRIED THAT YOUR FOOD WOULD RUN OUT BEFORE YOU GOT MONEY TO BUY MORE.: NEVER TRUE

## 2021-06-28 SDOH — ECONOMIC STABILITY: INCOME INSECURITY: HOW HARD IS IT FOR YOU TO PAY FOR THE VERY BASICS LIKE FOOD, HOUSING, MEDICAL CARE, AND HEATING?: VERY HARD

## 2021-06-28 SDOH — ECONOMIC STABILITY: FOOD INSECURITY: WITHIN THE PAST 12 MONTHS, THE FOOD YOU BOUGHT JUST DIDN'T LAST AND YOU DIDN'T HAVE MONEY TO GET MORE.: NEVER TRUE

## 2021-06-28 ASSESSMENT — ACTIVITIES OF DAILY LIVING (ADL): DEPENDENT_IADLS:: CLEANING;TRANSPORTATION

## 2021-06-28 NOTE — PROGRESS NOTES
ARLETHM x1 with patient as I received a message from her home care RN requesting a refill on her dilaudid. I asked the patient to call me back to go over pain management plan

## 2021-06-29 ENCOUNTER — PATIENT OUTREACH (OUTPATIENT)
Dept: CARE COORDINATION | Facility: CLINIC | Age: 46
End: 2021-06-29

## 2021-06-29 DIAGNOSIS — K21.9 GASTROESOPHAGEAL REFLUX DISEASE WITHOUT ESOPHAGITIS: Chronic | ICD-10-CM

## 2021-06-29 NOTE — PROGRESS NOTES
Clinic Care Coordination Contact  Program: [facility]  County: [facility] Case #:  [facility] Worker:   Irvingyamini #:   Subscriber #:   Renewal:  Date Applied:     FRW Outreach:  6/29/2021: Outreach attempted x 1. Left message on voicemail with call back information and requested return call.  Plan: FRW will call again within one week.       Health Insurance:    Major Programs  This subscriber has eligibility for [place]: Medical Assistance.  Elig Type AA: Parent of a dependent child  Eligibility Begin Date: 04/01/2020  Eligibility End Date: --/--/----  This subscriber is eligible for the following service types: Medical Care ,  Chiropractic ,  Dental Care ,  Hospital ,  Hospital - Inpatient ,  Hospital - Outpatient ,  Emergency Services ,  Pharmacy ,  Professional (Physician) Visit - Office ,  Vision (Optometry) ,  Mental Health ,  Urgent Care  Prepaid Health Plan  This subscriber receives [place]12 - Prepaid Medical Assistance Program (PMAP) delivered through [employer]. The phone numbers is [phone] ().    Referral/Screening:    Is patient requesting help applying for [facility] benefits? Yes   Have you recently applied for any [facility] benefits? No   How many people in your household? 3   Do you buy/eat food together? Yes   What is the monthly gross income for the household (wages, social security, workers comp, and pension)?  --  Currently no income, has been using [place] savings   [place] for [place]? No  Pt currently has [employer] [place]   Is this an insurance renewal? No   Is this a new insurance application request? No   Is this a jordan care application? No   Any other information for the FRW? Pt having diff-  iculty getting  LTD through her  employer, has-  n't received a  pay check since  the first week  in April.

## 2021-06-30 ENCOUNTER — VIRTUAL VISIT (OUTPATIENT)
Dept: SURGERY | Facility: CLINIC | Age: 46
End: 2021-06-30
Payer: COMMERCIAL

## 2021-06-30 VITALS — BODY MASS INDEX: 41.64 KG/M2 | WEIGHT: 235 LBS | HEIGHT: 63 IN

## 2021-06-30 DIAGNOSIS — K51.919 ULCERATIVE COLITIS WITH COMPLICATION, UNSPECIFIED LOCATION (H): ICD-10-CM

## 2021-06-30 DIAGNOSIS — E66.01 MORBID OBESITY (H): ICD-10-CM

## 2021-06-30 DIAGNOSIS — Z09 FOLLOW-UP EXAMINATION AFTER COLORECTAL SURGERY: Primary | ICD-10-CM

## 2021-06-30 PROCEDURE — 99024 POSTOP FOLLOW-UP VISIT: CPT | Performed by: NURSE PRACTITIONER

## 2021-06-30 ASSESSMENT — MIFFLIN-ST. JEOR: SCORE: 1680.08

## 2021-06-30 ASSESSMENT — PAIN SCALES - GENERAL: PAINLEVEL: EXTREME PAIN (8)

## 2021-06-30 NOTE — PROGRESS NOTES
"Colon and Rectal Surgery Consult Video Note    Date: 2021     Referring provider:  No referring provider defined for this encounter.     RE: Doretha Fernandez  : 1975  JUNIOR: 2021    Doretha Fernandez is a 46 year old female who is being evaluated via a billable video visit.      The patient has been notified of following:     \"This video visit will be conducted via a call between you and your physician/provider. We have found that certain health care needs can be provided without the need for an in-person physical exam.  This service lets us provide the care you need with a video conversation.  If a prescription is necessary we can send it directly to your pharmacy.  If lab work is needed we can place an order for that and you can then stop by our lab to have the test done at a later time.    Video visits are billed at different rates depending on your insurance coverage.  Please reach out to your insurance provider with any questions.    If during the course of the call the physician/provider feels a video visit is not appropriate, you will not be charged for this service.\"    Patient has given verbal consent for Video visit? Yes    Video Start Time: 902     Doretha Fernandez is a very pleasant 46 year old female with PMH of melanoma unknown primary (2017), s/p left axillary LN dissection (2017, Dr. Cool) in remission, history of adjuvant nivolumab induced colitis in 2018 treated with remicade (x1 at [place]) and Entyvio, h/o c.diff (recent bout, started PO vanc ), seronegative RA on prednisone & tocilizumab, steroid induced DM, history of cushings, chronic pain, relatively new diagnosis of UC, received Entyvio x2 (most recent infusions on , ), mostly steroid dependent x3 years.  Pt had recent readmissions for hematochezia, acute on chronic abdominal & rectal pain, nausea for refractory UC and concerns regarding risks associated with melanoma recurrence with using standard UC maintenance " medications.  She is now status post total abdominal colectomy and end ileostomy on 6/15/2021 with Dr. Ram.    FINAL DIAGNOSIS:   COLON, RESECTION:   - Moderate chronic active colitis with crypt abscess formation (Swapna   grade 3)   - Negative for dysplasia and malignancy   - Viable surgical margins   - Two benign lymph nodes (0/2)     Interval History:   Doretha has been doing well since returning home. She states that she has pain but is happy she no longer feels sick. She took tylenol and only one oxycodone yesterday. No pouching difficulties and she is putting out about 650-850 ml/day. She is voiding normally. She is on a prednisone taper and is loosing weight. She would like to continue to loose weight and plans to work with a dietician. Her [facility] nurses have told her that her incisions look good.    Assessment/Plan: 46 year old female status post total abdominal colectomy and end ileostomy on 6/15/2021 with Dr. Ram.  Recovering well from surgery.  Pain is well controlled.  Outputs from her ostomy have been good.  She is eating and drinking without difficulty.  Will need to be on a low residue diet for another 2 weeks and she can slowly add fiber back into her diet.  I recommended working with our medical weight management team on weight loss, and she is interested in this.  I will place referral.  No lifting more than 10 pounds for full 6 weeks from surgery.  She is scheduled for follow-up with Dr. Ram on 7/22/2021.  Encouraged her to contact the clinic in the meantime with any questions or concerns.    Video-Visit Details    Type of service:  Video Visit    Video End Time (time video stopped): 0918    Originating Location (pt. Location): Home    Distant Location (provider location):  [facility] [place]     Mode of Communication:  Video Conference via [employer]    20 minutes spent on the date of the encounter doing chart review, history, review of  imaging, documentation ,and further activities as noted above, which includes my time spent on video with the patient/family.       Medical history:  Past Medical History:   Diagnosis Date     Abnormal MRI     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      Anxiety      Basal cell carcinoma      Cervical high risk HPV (human papillomavirus) test positive 2019    See problem list     Colitis      Depression      Diabetes mellitus, iatrogenic (H) 2020     Inflammatory arthritis      Insomnia      Intestinal giardiasis 3/5/2018     Lumbago     left lower back pain     Lymphedema      Malignant melanoma (H)      Melanoma (H) 10/23/2017     Mild persistent asthma      Obesity      STORMY (obstructive sleep apnea)      Prothrombin deficiency (H)     takes 81mg asa daily     Stroke (cerebrum) (H)     During      TIA (transient ischemic attack)      Type 2 diabetes mellitus (H)        Surgical history:  Past Surgical History:   Procedure Laterality Date     APPENDECTOMY       COLONOSCOPY N/A 10/18/2017    Procedure: COLONOSCOPY;  Colon;  Surgeon: Debbie Stephens MD;  [facility]     COLONOSCOPY N/A 3/9/2018    Procedure: COMBINED COLONOSCOPY, SINGLE OR MULTIPLE BIOPSY/POLYPECTOMY BY BIOPSY;  colon;  Surgeon: Benita Schumacher MD;  Location: [facility]     COLONOSCOPY      multiple since  to present - about 6 total     DISSECT LYMPH NODE AXILLA Left 10/23/2017    Procedure: DISSECT LYMPH NODE AXILLA;  Left Axillary Lymph Node Dissection ;  Surgeon: Laurent Cool MD;  Location: [facility] OR     EXAM UNDER ANESTHESIA PELVIC N/A 2020    Procedure: EXAM UNDER ANESTHESIA, PELVIS; with Cervical Biopsies, Vaginal Biopsy and Endocervical Curettings;  Surgeon: Melina Jung MD;  Location: [facility] OR     GYN SURGERY  ,          LAPAROSCOPIC ASSISTED COLECTOMY N/A 6/15/2021    Procedure: laparoscopic total abdominal colectomy, end ileostomy;  Surgeon: Quinton Ram,  MD;  Location: [facility] OR     REPAIR MOHS Left 7/25/2017    Procedure: REPAIR MOHS;  Left Upper Lid Moh's Reconstruction;  Surgeon: Kisha Bosch MD;  Location[facility]       Problem list:  Patient Active Problem List    Diagnosis Date Noted     Ulcerative colitis without complications, unspecified location (H) 06/03/2021     Priority: Medium     Dehydration 04/13/2021     Priority: Medium     Hematochezia 04/13/2021     Priority: Medium     Diarrhea of infectious origin 04/13/2021     Priority: Medium     C. difficile colitis 04/13/2021     Priority: Medium     Pain syndrome, chronic 02/12/2020     Priority: Medium     Drug-induced Cushing's syndrome (H) 02/12/2020     Priority: Medium     Diabetes mellitus, iatrogenic (H) 01/28/2020     Priority: Medium     High risk HPV infection 01/28/2020     Priority: Medium     Added automatically from request for surgery [id number]       Immunosuppression (H) 01/28/2020     Priority: Medium     Added automatically from request for surgery [id number]       STORMY (obstructive sleep apnea) 01/27/2020     Priority: Medium     1/2019 (241#)-AHI 24, lowest oxygen saturation was 86%, no periodic limb movement were noted, CPAP 8 cm/H20 was effective.       Secondary lymphedema 01/27/2020     Priority: Medium     Chronic neutrophilia 01/27/2020     Priority: Medium     Cervical high risk HPV (human papillomavirus) test positive 12/13/2019     Priority: Medium     2011 NIL pap.  2015 NIL pap, Neg HPV.  12/13/19 NIL pap , + HR HPV 16. Plan colp.   1/6/19 Failed colp exam secondary to anatomic constraints with gyn. Referred to gyn/onc. Tracking ended.        Immunosuppressed status (H) 09/05/2019     Priority: Medium     Ulcerative colitis with complication, unspecified location (H) 09/05/2019     Priority: Medium     Morbid obesity (H) 09/05/2019     Priority: Medium     Arthritis, rheumatoid (H) 11/26/2018     Priority: Medium     Hypocalcemia 05/15/2018     Priority: Medium     Anemia  05/14/2018     Priority: Medium     Menstrual irregularity 05/14/2018     Priority: Medium     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     Priority: Medium     Malignant melanoma of left upper extremity including shoulder (H) 10/12/2017     Priority: Medium     Metastatic malignant melanoma (H) 10/10/2017     Priority: Medium     Prothrombin mutation (H) 04/05/2017     Priority: Medium     On daily aspirin 81 mg per hematology's recommendations from 2008       Vision changes 04/01/2017     Priority: Medium     Mild intermittent asthma without complication 11/08/2013     Priority: Medium     Mild major depression (H) 06/24/2013     Priority: Medium     Anxiety state 09/13/2012     Priority: Medium     Problem list name updated by automated process. Provider to review       Esophageal reflux 09/13/2012     Priority: Medium     DUB (dysfunctional uterine bleeding) 07/28/2011     Priority: Medium     CARDIOVASCULAR SCREENING; LDL GOAL LESS THAN 160 07/28/2011     Priority: Low       Medications:  Current Outpatient Medications   Medication Sig Dispense Refill     acetaminophen (TYLENOL) 325 MG tablet Take 3 tablets (975 mg) by mouth every 8 hours As scheduled for the next 7-10 days, then decrease both dose & frequency to as needed there after. 90 tablet 0     Alcohol Swabs (ALCOHOL PREP) 70 % PADS 1 applicator 3 times daily 100 each 0     amLODIPine (NORVASC) 5 MG tablet Take 1 tablet  (5 mg) by mouth daily Needs follow up visit before next refill due 30 tablet 0     blood glucose (NO BRAND SPECIFIED) lancets standard Use to test blood sugar 3 times daily or as directed. 1 each 0     blood glucose (NO BRAND SPECIFIED) test strip Use to test blood sugar 3 times daily or as directed. 200 strip 0     Calcium Carb-Cholecalciferol 600-800 MG-UNIT TABS Take 800 mg by mouth every morning (before breakfast) 90 tablet 3     calcium carbonate (TUMS) 500 MG chewable tablet Take 1 tablet (500 mg) by mouth 3 times daily as needed for heartburn 30 tablet 0     enoxaparin ANTICOAGULANT (LOVENOX) 40 MG/0.4ML syringe Inject 0.4 mLs (40 mg) Subcutaneous every 12 hours for 26 days 20.8 mL 0     gabapentin (NEURONTIN) 300 MG capsule TAKE THREE CAPSULES BY MOUTH FOUR TIMES A  capsule 0     HYDROmorphone (DILAUDID) 2 MG tablet Take 1 tablet (2 mg) by mouth every 6 hours as needed for moderate to severe pain 10 tablet 0     hydrOXYzine (ATARAX) 25 MG tablet Take 1 tablet (25 mg) by mouth every 6 hours as needed for anxiety 40 tablet 0     insulin glargine (LANTUS PEN) 100 UNIT/ML pen Inject 4 Units Subcutaneous At Bedtime       insulin lispro (HUMALOG KWIKPEN) 100 UNIT/ML (1 unit dial) KWIKPEN Inject 1-7 Units Subcutaneous 4 times daily (with meals and nightly) Per the insulin sliding scale provided in the discharge summary. 3 mL 2     metFORMIN (GLUCOPHAGE-XR) 500 MG 24 hr tablet Take 1,000 mg by mouth Daily with breakfast.       oxyCODONE (ROXICODONE) 5 MG tablet Take 5 mg by mouth every 4 hours as needed for moderate to severe pain       pantoprazole (PROTONIX) 40 MG EC tablet Take 1 tablet (40 mg) by mouth every morning (before breakfast) Needs follow up visit before next refill due 30 tablet 0     predniSONE (DELTASONE) 20 MG tablet Take 1 tablet (20 mg) by mouth daily Prednisone 20mg daily x5 days, then decrease to prednisone 10mg daily x5 days, then decrease to prednisone 5mg daily x5 days, then decrease  to prednisone 5mg every other day for 5 days, then off.       rosuvastatin (CRESTOR) 10 MG tablet Take 1 tablet (10 mg) by mouth daily 90 tablet 3     simethicone (MYLICON) 80 MG chewable tablet Take 1-2 tablets ( mg) by mouth 4 times daily as needed for cramping 120 tablet 0     sulfamethoxazole-trimethoprim (BACTRIM) 400-80 MG tablet Take 1 tablet by mouth daily 120 tablet 0     venlafaxine (EFFEXOR-ER) 225 MG 24 hr tablet TAKE ONE TABLET BY MOUTH ONCE DAILY 90 tablet 1     VITAMIN D3 25 MCG (1000 UT) tablet TAKE THREE TABLETS BY MOUTH ONCE DAILY 300 tablet 1     VITRON-C  MG TABS tablet Take 1 tablet by mouth daily 30 tablet 1       Allergies:  Allergies   Allergen Reactions     Bee Venom Swelling     Azithromycin Diarrhea     Erythromycin      Other reaction(s): GI intolerance, Vomiting     Fentanyl Other (See Comments)     sweating  sweating     Prochlorperazine Fatigue     Other reaction(s): Other (see comments)  Fatigue     Buspirone      Other reaction(s): GI intolerance  vomiting     Erythrocin Nausea and Vomiting     Zithromax [Azithromycin Dihydrate] Diarrhea     Enbrel [Etanercept] Hives and Rash       Family history:  Family History   Problem Relation Age of Onset     Cancer Mother 45        lung     Neurologic Disorder Mother         epilepsy     Lipids Father      Gastrointestinal Disease Father         diverticulitis      Depression Father      Colitis Father      Colon Cancer Father      Diverticulitis Father      Cancer Maternal Grandmother      Blood Disease Maternal Grandmother         lymphoma      Arthritis Maternal Grandmother      Diabetes Maternal Grandmother      Depression Maternal Grandmother      Macular Degeneration Maternal Grandmother      Glaucoma Maternal Grandmother      Diabetes Maternal Grandfather      Cerebrovascular Disease Maternal Grandfather      Blood Disease Maternal Grandfather      Heart Disease Maternal Grandfather      Glaucoma Maternal Grandfather       "Cancer Paternal Grandmother      Cancer - colorectal Paternal Grandmother      Colitis Paternal Grandmother      Colon Cancer Paternal Grandmother      Diverticulitis Paternal Grandmother      Respiratory Paternal Grandfather         emphysema      Colitis Paternal Grandfather      Colon Cancer Paternal Grandfather      Diverticulitis Paternal Grandfather      Heart Disease Daughter      Asthma Daughter      Depression Sister      Melanoma No family hx of        Social history:  Social History     Tobacco Use     Smoking status: Former Smoker     Packs/day: 1.00     Years: 5.00     Pack years: 5.00     Quit date: 3/20/1998     Years since quittin.2     Smokeless tobacco: Never Used   Substance Use Topics     Alcohol use: Yes     Comment: occ    Marital status: .    Nursing Notes:   Lou Concepcion CMA  2021  8:52 AM  Signed  Chief Complaint   Patient presents with     Surgical Followup     Post-op follow up, DOS:06/15/2021       Vitals:    21 0842   Weight: 235 lb   Height: 5' 3\"       Body mass index is 41.63 kg/m .         RICHMOND Nuñez APRN, NP-C  Colon and Rectal Surgery   [facility]    This note was created using speech recognition software and may contain unintended word substitutions.    "

## 2021-06-30 NOTE — LETTER
"2021       RE: Doretha Fernandez  [address] [facility] [address]     Dear Colleague,    Thank you for referring your patient, Doretha Fernandez, to the [facility] COLON AND RECTAL SURGERY CLINIC [place] at [facility]. Please see a copy of my visit note below.    Colon and Rectal Surgery Consult Video Note    Date: 2021     Referring provider:  No referring provider defined for this encounter.     RE: Doretha Fernandez  : 1975  JUNIOR: 2021    Doretha Fernandez is a 46 year old female who is being evaluated via a billable video visit.      The patient has been notified of following:     \"This video visit will be conducted via a call between you and your physician/provider. We have found that certain health care needs can be provided without the need for an in-person physical exam.  This service lets us provide the care you need with a video conversation.  If a prescription is necessary we can send it directly to your pharmacy.  If lab work is needed we can place an order for that and you can then stop by our lab to have the test done at a later time.    Video visits are billed at different rates depending on your insurance coverage.  Please reach out to your insurance provider with any questions.    If during the course of the call the physician/provider feels a video visit is not appropriate, you will not be charged for this service.\"    Patient has given verbal consent for Video visit? Yes    Video Start Time: 902     Doretha Fernandez is a very pleasant 46 year old female with PMH of melanoma unknown primary (), s/p left axillary LN dissection (2017, Dr. Cool) in remission, history of adjuvant nivolumab induced colitis in 2018 treated with remicade (x1 at [place]) and Entyvio, h/o c.diff (recent bout, started PO vanc ), seronegative RA on prednisone & tocilizumab, steroid induced DM, history of cushings, chronic pain, relatively new " diagnosis of UC, received Entyvio x2 (most recent infusions on 5/12, 5/26), mostly steroid dependent x3 years.  Pt had recent readmissions for hematochezia, acute on chronic abdominal & rectal pain, nausea for refractory UC and concerns regarding risks associated with melanoma recurrence with using standard UC maintenance medications.  She is now status post total abdominal colectomy and end ileostomy on 6/15/2021 with Dr. Ram.    FINAL DIAGNOSIS:   COLON, RESECTION:   - Moderate chronic active colitis with crypt abscess formation (Swapna   grade 3)   - Negative for dysplasia and malignancy   - Viable surgical margins   - Two benign lymph nodes (0/2)     Interval History:   Doretha has been doing well since returning home. She states that she has pain but is happy she no longer feels sick. She took tylenol and only one oxycodone yesterday. No pouching difficulties and she is putting out about 650-850 ml/day. She is voiding normally. She is on a prednisone taper and is loosing weight. She would like to continue to loose weight and plans to work with a dietician. Her [facility] nurses have told her that her incisions look good.    Assessment/Plan: 46 year old female status post total abdominal colectomy and end ileostomy on 6/15/2021 with Dr. Ram.  Recovering well from surgery.  Pain is well controlled.  Outputs from her ostomy have been good.  She is eating and drinking without difficulty.  Will need to be on a low residue diet for another 2 weeks and she can slowly add fiber back into her diet.  I recommended working with our medical weight management team on weight loss, and she is interested in this.  I will place referral.  No lifting more than 10 pounds for full 6 weeks from surgery.  She is scheduled for follow-up with Dr. Ram on 7/22/2021.  Encouraged her to contact the clinic in the meantime with any questions or concerns.    Video-Visit Details    Type of service:  Video Visit    Video End Time  (time video stopped): 918    Originating Location (pt. Location): Home    Distant Location (provider location):  [facility] COLON AND RECTAL SURGERY CLINIC [place]     Mode of Communication:  Video Conference via AmWell    20 minutes spent on the date of the encounter doing chart review, history, review of imaging, documentation ,and further activities as noted above, which includes my time spent on video with the patient/family.       Medical history:  Past Medical History:   Diagnosis Date     Abnormal MRI     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      Anxiety      Basal cell carcinoma      Cervical high risk HPV (human papillomavirus) test positive 2019    See problem list     Colitis      Depression      Diabetes mellitus, iatrogenic (H) 2020     Inflammatory arthritis      Insomnia      Intestinal giardiasis 3/5/2018     Lumbago     left lower back pain     Lymphedema      Malignant melanoma (H)      Melanoma (H) 10/23/2017     Mild persistent asthma      Obesity      STORMY (obstructive sleep apnea)      Prothrombin deficiency (H)     takes 81mg asa daily     Stroke (cerebrum) (H)     During      TIA (transient ischemic attack)      Type 2 diabetes mellitus (H)        Surgical history:  Past Surgical History:   Procedure Laterality Date     APPENDECTOMY       COLONOSCOPY N/A 10/18/2017    Procedure: COLONOSCOPY;  Colon;  Surgeon: Debbie Stephens MD;  [facility]     COLONOSCOPY N/A 3/9/2018    Procedure: COMBINED COLONOSCOPY, SINGLE OR MULTIPLE BIOPSY/POLYPECTOMY BY BIOPSY;  colon;  Surgeon: Benita Schumacher MD;  Location: [facility] GI     COLONOSCOPY      multiple since  to present - about 6 total     DISSECT LYMPH NODE AXILLA Left 10/23/2017    Procedure: DISSECT LYMPH NODE AXILLA;  Left Axillary Lymph Node Dissection ;  Surgeon: Laurent Cool MD;  [facility]     EXAM UNDER ANESTHESIA PELVIC N/A 2020    Procedure: EXAM UNDER ANESTHESIA,  PELVIS; with Cervical Biopsies, Vaginal Biopsy and Endocervical Curettings;  Surgeon: Melina Jung MD;  Location: [facility] OR     GYN SURGERY  ,          LAPAROSCOPIC ASSISTED COLECTOMY N/A 6/15/2021    Procedure: laparoscopic total abdominal colectomy, end ileostomy;  Surgeon: Quinton Ram MD;  Location: [facility] OR     REPAIR MOHS Left 2017    Procedure: REPAIR MOHS;  Left Upper Lid Moh's Reconstruction;  Surgeon: Kisha Bosch MD;  [facility]       Problem list:  Patient Active Problem List    Diagnosis Date Noted     Ulcerative colitis without complications, unspecified location (H) 2021     Priority: Medium     Dehydration 2021     Priority: Medium     Hematochezia 2021     Priority: Medium     Diarrhea of infectious origin 2021     Priority: Medium     C. difficile colitis 2021     Priority: Medium     Pain syndrome, chronic 2020     Priority: Medium     Drug-induced Cushing's syndrome (H) 2020     Priority: Medium     Diabetes mellitus, iatrogenic (H) 2020     Priority: Medium     High risk HPV infection 2020     Priority: Medium     Added automatically from request for surgery [id number]       Immunosuppression (H) 2020     Priority: Medium     Added automatically from request for surgery [id number]       STORMY (obstructive sleep apnea) 2020     Priority: Medium     2019 (241#)-AHI 24, lowest oxygen saturation was 86%, no periodic limb movement were noted, CPAP 8 cm/H20 was effective.       Secondary lymphedema 2020     Priority: Medium     Chronic neutrophilia 2020     Priority: Medium     Cervical high risk HPV (human papillomavirus) test positive 2019     Priority: Medium      NIL pap.   NIL pap, Neg HPV.  19 NIL pap , + HR HPV 16. Plan colp.   19 Failed colp exam secondary to anatomic constraints with gyn. Referred to gyn/onc. Tracking ended.        Immunosuppressed status  (H) 09/05/2019     Priority: Medium     Ulcerative colitis with complication, unspecified location (H) 09/05/2019     Priority: Medium     Morbid obesity (H) 09/05/2019     Priority: Medium     Arthritis, rheumatoid (H) 11/26/2018     Priority: Medium     Hypocalcemia 05/15/2018     Priority: Medium     Anemia 05/14/2018     Priority: Medium     Menstrual irregularity 05/14/2018     Priority: Medium     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     Priority: Medium     Malignant melanoma of left upper extremity including shoulder (H) 10/12/2017     Priority: Medium     Metastatic malignant melanoma (H) 10/10/2017     Priority: Medium     Prothrombin mutation (H) 04/05/2017     Priority: Medium     On daily aspirin 81 mg per hematology's recommendations from 2008       Vision changes 04/01/2017     Priority: Medium     Mild intermittent asthma without complication 11/08/2013     Priority: Medium     Mild major depression (H) 06/24/2013     Priority: Medium     Anxiety state 09/13/2012     Priority: Medium     Problem list name updated by automated process. Provider to review       Esophageal reflux 09/13/2012     Priority: Medium     DUB (dysfunctional uterine bleeding) 07/28/2011     Priority: Medium     CARDIOVASCULAR SCREENING; LDL GOAL LESS THAN 160 07/28/2011     Priority: Low       Medications:  Current Outpatient Medications   Medication Sig Dispense Refill     acetaminophen  (TYLENOL) 325 MG tablet Take 3 tablets (975 mg) by mouth every 8 hours As scheduled for the next 7-10 days, then decrease both dose & frequency to as needed there after. 90 tablet 0     Alcohol Swabs (ALCOHOL PREP) 70 % PADS 1 applicator 3 times daily 100 each 0     amLODIPine (NORVASC) 5 MG tablet Take 1 tablet (5 mg) by mouth daily Needs follow up visit before next refill due 30 tablet 0     blood glucose (NO BRAND SPECIFIED) lancets standard Use to test blood sugar 3 times daily or as directed. 1 each 0     blood glucose (NO BRAND SPECIFIED) test strip Use to test blood sugar 3 times daily or as directed. 200 strip 0     Calcium Carb-Cholecalciferol 600-800 MG-UNIT TABS Take 800 mg by mouth every morning (before breakfast) 90 tablet 3     calcium carbonate (TUMS) 500 MG chewable tablet Take 1 tablet (500 mg) by mouth 3 times daily as needed for heartburn 30 tablet 0     enoxaparin ANTICOAGULANT (LOVENOX) 40 MG/0.4ML syringe Inject 0.4 mLs (40 mg) Subcutaneous every 12 hours for 26 days 20.8 mL 0     gabapentin (NEURONTIN) 300 MG capsule TAKE THREE CAPSULES BY MOUTH FOUR TIMES A  capsule 0     HYDROmorphone (DILAUDID) 2 MG tablet Take 1 tablet (2 mg) by mouth every 6 hours as needed for moderate to severe pain 10 tablet 0     hydrOXYzine (ATARAX) 25 MG tablet Take 1 tablet (25 mg) by mouth every 6 hours as needed for anxiety 40 tablet 0     insulin glargine (LANTUS PEN) 100 UNIT/ML pen Inject 4 Units Subcutaneous At Bedtime       insulin lispro (HUMALOG KWIKPEN) 100 UNIT/ML (1 unit dial) KWIKPEN Inject 1-7 Units Subcutaneous 4 times daily (with meals and nightly) Per the insulin sliding scale provided in the discharge summary. 3 mL 2     metFORMIN (GLUCOPHAGE-XR) 500 MG 24 hr tablet Take 1,000 mg by mouth Daily with breakfast.       oxyCODONE (ROXICODONE) 5 MG tablet Take 5 mg by mouth every 4 hours as needed for moderate to severe pain       pantoprazole (PROTONIX) 40 MG EC tablet Take 1 tablet (40 mg) by  mouth every morning (before breakfast) Needs follow up visit before next refill due 30 tablet 0     predniSONE (DELTASONE) 20 MG tablet Take 1 tablet (20 mg) by mouth daily Prednisone 20mg daily x5 days, then decrease to prednisone 10mg daily x5 days, then decrease to prednisone 5mg daily x5 days, then decrease to prednisone 5mg every other day for 5 days, then off.       rosuvastatin (CRESTOR) 10 MG tablet Take 1 tablet (10 mg) by mouth daily 90 tablet 3     simethicone (MYLICON) 80 MG chewable tablet Take 1-2 tablets ( mg) by mouth 4 times daily as needed for cramping 120 tablet 0     sulfamethoxazole-trimethoprim (BACTRIM) 400-80 MG tablet Take 1 tablet by mouth daily 120 tablet 0     venlafaxine (EFFEXOR-ER) 225 MG 24 hr tablet TAKE ONE TABLET BY MOUTH ONCE DAILY 90 tablet 1     VITAMIN D3 25 MCG (1000 UT) tablet TAKE THREE TABLETS BY MOUTH ONCE DAILY 300 tablet 1     VITRON-C  MG TABS tablet Take 1 tablet by mouth daily 30 tablet 1       Allergies:  Allergies   Allergen Reactions     Bee Venom Swelling     Azithromycin Diarrhea     Erythromycin      Other reaction(s): GI intolerance, Vomiting     Fentanyl Other (See Comments)     sweating  sweating     Prochlorperazine Fatigue     Other reaction(s): Other (see comments)  Fatigue     Buspirone      Other reaction(s): GI intolerance  vomiting     Erythrocin Nausea and Vomiting     Zithromax [Azithromycin Dihydrate] Diarrhea     Enbrel [Etanercept] Hives and Rash       Family history:  Family History   Problem Relation Age of Onset     Cancer Mother 45        lung     Neurologic Disorder Mother         epilepsy     Lipids Father      Gastrointestinal Disease Father         diverticulitis      Depression Father      Colitis Father      Colon Cancer Father      Diverticulitis Father      Cancer Maternal Grandmother      Blood Disease Maternal Grandmother         lymphoma      Arthritis Maternal Grandmother      Diabetes Maternal Grandmother       "Depression Maternal Grandmother      Macular Degeneration Maternal Grandmother      Glaucoma Maternal Grandmother      Diabetes Maternal Grandfather      Cerebrovascular Disease Maternal Grandfather      Blood Disease Maternal Grandfather      Heart Disease Maternal Grandfather      Glaucoma Maternal Grandfather      Cancer Paternal Grandmother      Cancer - colorectal Paternal Grandmother      Colitis Paternal Grandmother      Colon Cancer Paternal Grandmother      Diverticulitis Paternal Grandmother      Respiratory Paternal Grandfather         emphysema      Colitis Paternal Grandfather      Colon Cancer Paternal Grandfather      Diverticulitis Paternal Grandfather      Heart Disease Daughter      Asthma Daughter      Depression Sister      Melanoma No family hx of        Social history:  Social History     Tobacco Use     Smoking status: Former Smoker     Packs/day: 1.00     Years: 5.00     Pack years: 5.00     Quit date: 3/20/1998     Years since quittin.2     Smokeless tobacco: Never Used   Substance Use Topics     Alcohol use: Yes     Comment: occ    Marital status: .    Nursing Notes:   Lou Concepcion CMA  2021  8:52 AM  Signed  Chief Complaint   Patient presents with     Surgical Followup     Post-op follow up, DOS:06/15/2021       Vitals:    21 0842   Weight: 235 lb   Height: 5' 3\"       Body mass index is 41.63 kg/m .         RICHMOND Nuñez APRN, NP-C  Colon and Rectal Surgery   [facility]    This note was created using speech recognition software and may contain unintended word substitutions.        Again, thank you for allowing me to participate in the care of your patient.      Sincerely,    AMADA Rowe CNP      "

## 2021-06-30 NOTE — NURSING NOTE
"Chief Complaint   Patient presents with     Surgical Followup     Post-op follow up, DOS:06/15/2021       Vitals:    06/30/21 0842   Weight: 235 lb   Height: 5' 3\"       Body mass index is 41.63 kg/m .         Lou Pineda CMA    "

## 2021-07-01 ENCOUNTER — DOCUMENTATION ONLY (OUTPATIENT)
Dept: SURGERY | Facility: CLINIC | Age: 46
End: 2021-07-01

## 2021-07-01 RX ORDER — CALCIUM CARBONATE 500 MG/1
1 TABLET, CHEWABLE ORAL 3 TIMES DAILY PRN
Qty: 30 TABLET | Refills: 0 | Status: ON HOLD | OUTPATIENT
Start: 2021-07-01 | End: 2023-09-06

## 2021-07-01 NOTE — PROGRESS NOTES
FMLA Paper Work    - FMLA paperwork received 06/21/2021    - Surgery Date: 06/15/2021    - First Post-Op Date: 06/30/2021    - Second Post-Op Date: 07/22/2021    - Filled Out: 07/01/2021    - Restrictions: No lifting, pushing or pulling anything greater than ten pounds for six weeks post surgery. No driving or operating machinery if taking narcotics.    - Expected Return to Work Date: 07/28/2021    - Signed by Provider: 07/01/2021    - Faxed to: (Fax Number [employer] Company) Emailed to patient at [email or web address]    - Copy sent to scanning and original in file folder in Clinic 07/01/2021.         Lou Pineda CMA

## 2021-07-02 ENCOUNTER — PATIENT OUTREACH (OUTPATIENT)
Dept: SURGERY | Facility: CLINIC | Age: 46
End: 2021-07-02

## 2021-07-02 NOTE — PROGRESS NOTES
Patient states that she found whole pills in her ileostomy bag. Once is her effexor which is delayed release and one is metformin. She noticed this yesterday. Discussed with Dr. Ram. Told patient that she should take her pills as she normally does and discuss with prescriber to see if she can get a different form of the medication.

## 2021-07-05 ENCOUNTER — TELEPHONE (OUTPATIENT)
Dept: FAMILY MEDICINE | Facility: CLINIC | Age: 46
End: 2021-07-05

## 2021-07-05 DIAGNOSIS — F41.1 ANXIETY STATE: Primary | Chronic | ICD-10-CM

## 2021-07-05 DIAGNOSIS — G89.4 PAIN SYNDROME, CHRONIC: ICD-10-CM

## 2021-07-05 DIAGNOSIS — E13.9 DIABETES MELLITUS, IATROGENIC (H): Chronic | ICD-10-CM

## 2021-07-05 DIAGNOSIS — F32.0 MILD MAJOR DEPRESSION (H): Chronic | ICD-10-CM

## 2021-07-05 RX ORDER — VENLAFAXINE HYDROCHLORIDE 75 MG/1
75 CAPSULE, EXTENDED RELEASE ORAL 3 TIMES DAILY
Qty: 450 ML | Refills: 5 | Status: SHIPPED | OUTPATIENT
Start: 2021-07-05 | End: 2021-07-19

## 2021-07-05 RX ORDER — GABAPENTIN 250 MG/5ML
900 SOLUTION ORAL 4 TIMES DAILY
Qty: 2160 ML | Refills: 5 | Status: SHIPPED | OUTPATIENT
Start: 2021-07-05 | End: 2021-07-15

## 2021-07-05 RX ORDER — VENLAFAXINE 75 MG/1
75 TABLET ORAL 3 TIMES DAILY
Status: CANCELLED | OUTPATIENT
Start: 2021-07-05

## 2021-07-05 RX ORDER — METFORMIN HYDROCHLORIDE 500 MG/5ML
1000 SOLUTION ORAL
Qty: 473 ML | Refills: 5 | Status: SHIPPED | OUTPATIENT
Start: 2021-07-05 | End: 2021-07-15

## 2021-07-05 NOTE — TELEPHONE ENCOUNTER
Pt requesting meds to be ordered in liquid form due to decreased absorption of meds since getting ileostomy.  Pt feels these meds have been less effective and Colorectal surgeon recommended liquid form.    Glucophage- mg - 1000 mg/day.   -Metformin ER comes in Suspension.    Effexor- mg/day.  -comes in liquid form would need to take 2-3x/day.    Gabapentin 300 mg 3 caps 4x/day.  -comes in liquid form.    [facility].    Advise.  Michael

## 2021-07-05 NOTE — TELEPHONE ENCOUNTER
Reason for Call:  Medication request:    Do you use a [facility]?  Name of the pharmacy and phone number for the current request:  [facility] [phone]    Name of the medication requested: *Patient just had ileostomy two weeks ago and meds are not being absorbed: Effexor, Metformin, possibly Gabapentin. Patient needs liquid meds    Can we leave a detailed message on this number? YES    Phone number patient can be reached at: Home number on file [phone] (home)    Best Time: Any    Call taken on 7/5/2021 at 12:46 PM by Elisa Casas

## 2021-07-06 ENCOUNTER — PATIENT OUTREACH (OUTPATIENT)
Dept: CARE COORDINATION | Facility: CLINIC | Age: 46
End: 2021-07-06

## 2021-07-06 NOTE — PROGRESS NOTES
Clinic Care Coordination Contact  Program: [facility]  County: [facility] Case #:  [facility] Worker:   Irvingyamini #:   Subscriber #:   Renewal:  Date Applied:     FRW Outreach:  2021: FRW called patient for our scheduled phone appointment and patient would like to reschedule. We rescheduled to 21 at 10:00 am. FRW will make outreach at that time.   2021: FRW called patient and we went through the combined application screening. They appear to be within the income guidelines o we scheduled a phone visit to apply on 21 at 10:00 am. FRW will make outreach at that time.   2021: Outreach attempted x 1. Left message on [employer] with call back information and requested return call.  Plan: FRW will call again within one week.     SNAP/CASH Application Screenin. Have you had county benefits before? Years ago  2. How many people in the household, do you eat/buy food together? 3, herself, 2 daughters   3. What is your monthly income (include all tax members)? None hasn't worked since 21. Daughter, $13, 30 hours $1,690  4. Do you have a bank account? Checking and Savings,   5. Do you have any utility bills (electricity, rent, mortgage, phone, insurance, medical bills, etc.)? Mortgage $1451  6. Do you have social security cards and/or green cards? [place] Citizens       Health Insurance:    Major Programs  This subscriber has eligibility for [place]: Medical Assistance.  Elig Type AA: Parent of a dependent child  Eligibility Begin Date: 2020  Eligibility End Date: --/--/----  This subscriber is eligible for the following service types: Medical Care ,  Chiropractic ,  Dental Care ,  Hospital ,  Hospital - Inpatient ,  Hospital - Outpatient ,  Emergency Services ,  Pharmacy ,  Professional (Physician) Visit - Office ,  Vision (Optometry) ,  Mental Health ,  Urgent Care  Prepaid Health Plan  This subscriber receives MA12 - Prepaid Medical Assistance Program (PMAP) delivered through [employer]. The  phone numbers is [phone] ().    Referral/Screening:    Is patient requesting help applying for [facility] benefits? Yes   Have you recently applied for any [facility] benefits? No   How many people in your household? 3   Do you buy/eat food together? Yes   What is the monthly gross income for the household (wages, social security, workers comp, and pension)?  --  Currently no income, has been using her savings   [place]? No  Pt currently has [employer] [place]   Is this an insurance renewal? No   Is this a new insurance application request? No   Is this a jordan care application? No   Any other information for the FRW? Pt having diff-  iculty getting  LTD through her  employer, has-  n't received a  pay check since  the first week  in April.       Financial Resource Worker Follow Up    Goals:   Goals Addressed as of 7/6/2021 at 9:08 AM                 Today      Financial Wellbeing (pt-stated)   20%    Added 6/28/21 by Polly Huerta, RN     Goal Statement: I will apply for [facility] Benefits within the next 1-2 weeks if I am eligible.   Date Goal Set:  6/28/2021   Barriers:  Having difficulty obtaining LTD through employer, recent abdominal surgery/hospitalization  Strengths:  Motivated, engaged in care coordination  Date to Achieve By:  7/12/2021  Patient expressed understanding of goal:  Yes  Action steps to achieve this goal:  1.  I understand a referral was placed to the Financial Resource Worker, I will receive a call within the next 3 business days.    2.  I understand the financial worker will make two attempts to call me. If I still need help with this goal, I will connect with my Community Health Worker Yesika at [phone].            Intervention and Education during outreach: n/a    FRW Plan: FRW will make outreach 7/14/21 at 10:00 am.

## 2021-07-08 ENCOUNTER — NURSE TRIAGE (OUTPATIENT)
Dept: NURSING | Facility: CLINIC | Age: 46
End: 2021-07-08

## 2021-07-08 NOTE — TELEPHONE ENCOUNTER
[facility]: Nurse Triage Note  SITUATION/BACKGROUND                                                      Doretha Fernandez is a 46 year old female who calls with abdominal pain and concerns of redness and warmth to her ostomy stoma.      Allergies:   Allergies   Allergen Reactions     Bee Venom Swelling     Azithromycin Diarrhea     Erythromycin      Other reaction(s): GI intolerance, Vomiting     Fentanyl Other (See Comments)     sweating  sweating     Prochlorperazine Fatigue     Other reaction(s): Other (see comments)  Fatigue     Buspirone      Other reaction(s): GI intolerance  vomiting     Erythrocin Nausea and Vomiting     Zithromax [Azithromycin Dihydrate] Diarrhea     Enbrel [Etanercept] Hives and Rash       ASSESSMENT      46 year old female status post total abdominal colectomy and end ileostomy on 6/15/2021 with Dr. Ram. She calls today after her home health nurse left to update us because she is experiencing left abdominal pain for 24 hours.  The pain is constant with occasional sharpness  She rates the pain 5-6/10.  She also states that she has pain on her right side under her stoma   She states on Tuesday her bag leaked and she had to change it   She noted that around the stoma her skin was red and hot to touch  The size of the stoma had also decreased in size.  She applied the bag and has not had any issues with it since  Her output is normal She states the skin around the bag is normal  Denies fever.  She does not want to remove the adhesive to evaluate the stoma because it causes skin irritation    Will forward to colon- rectal team to see if she can be seen today for evaluation or call back with recommendations           RECOMMENDATION/PLAN                                                      RECOMMENDED DISPOSITION:  See above  Will comply with recommendation: Yes    If further questions/concerns or if symptoms do not improve, worsen or new symptoms develop, call your PCP  or [phone] to talk with the Resident on call, as soon as possible.    Guideline used: abdominal pain  Telephone Triage Protocols for Nurses, Fifth Edition, Isaura Baez, RN, RN

## 2021-07-12 ENCOUNTER — VIRTUAL VISIT (OUTPATIENT)
Dept: GASTROENTEROLOGY | Facility: CLINIC | Age: 46
End: 2021-07-12
Payer: COMMERCIAL

## 2021-07-12 VITALS — BODY MASS INDEX: 42.34 KG/M2 | WEIGHT: 239 LBS

## 2021-07-12 DIAGNOSIS — K51.919 ULCERATIVE COLITIS WITH COMPLICATION, UNSPECIFIED LOCATION (H): Primary | ICD-10-CM

## 2021-07-12 PROCEDURE — 99213 OFFICE O/P EST LOW 20 MIN: CPT | Mod: 95 | Performed by: PHYSICIAN ASSISTANT

## 2021-07-12 NOTE — PROGRESS NOTES
"Doretha Fernandez is a 46 year old female who is being evaluated via a billable video visit.      The patient has been notified of following:     \"This video visit will be conducted via a call between you and your physician/provider. We have found that certain health care needs can be provided without the need for an in-person physical exam.  This service lets us provide the care you need with a video conversation.  If a prescription is necessary we can send it directly to your pharmacy.  If lab work is needed we can place an order for that and you can then stop by our lab to have the test done at a later time.    If during the course of the call the physician/provider feels a video visit is not appropriate, you will not be charged for this service.\"     Patient confirmed that they are in [place] for today's visit no. Patient is currently in [facility].    Video-Visit Details  Type of service:  Video Visit     Video Start Time: 1:38 PM  Video End Time:  1:54 PM    Originating Location (pt. Location): Home    Distant Location (provider location):  [facility] [place]     Platform used: [facility]    IBD CLINIC VISIT     CC/REFERRING MD:  [facility]  REASON FOR FOLLOW UP: UC proctosigmoiditis  COLLABORATING PHYSICIAN: Renard Davey    IBD HISTORY  Age at diagnosis: 2021  Extent of disease: proctosigmoid   Current UC medications: None  Prior UC surgeries: total abdominal colectomy and end ileostomy 6/15/21   Prior IBD Medications:   Infliximab, 1 dose in the hospital good response  Vedolizumab, completed induction dosing  Prednisone, minimal response to high-dose oral and IV with significant weight gain  5-ASA, no response  Topical therapies to include enemas, no response    DRUG MONITORING  TPMT enzyme activity: --    6-TGN/6-MMPN levels: --    Biologic concentration:--    DISEASE ASSESSMENT  Labs:  Lab Results   Component Value Date    ALBUMIN 2.8 05/07/2021     Recent Labs "   Lab Test 06/09/21  0626 06/08/21  0623 06/03/21  2040 05/26/21  1320   CRP 5.3 7.5 25.0* 6.0   SED  --   --  9 5     Endoscopic assessment:   Colon path:  FINAL DIAGNOSIS:   COLON, RESECTION:   - Moderate chronic active colitis with crypt abscess formation (Swapna   grade 3)   - Negative for dysplasia and malignancy   - Viable surgical margins   - Two benign lymph nodes (0/2)   Enterography: --  Fecal calprotectin: --   C diff: 3/2021 treated with vanco taper (at [place])     IBDQ Score Date IBDQ - Total Score  (Higher score better)   4/10/2018 32     ASSESSMENT/PLAN  Ms. Fernandez is 47 yo F her for follow up following colectomy    1.  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 She continues to follow closely with colorectal surgery at this time.     2.  Seronegative RA: Patient has followed with rheumatology.  She is working on getting off of prednisone. I suggested she consider working with endocrinology regarding the best plan for taper off.     3.  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     Thank you for this consultation.  It was a pleasure to participate in the care of this patient; please contact us with any further questions.      Zeferino Sanchez PA-C  Division of Gastroenterology, Hepatology and Nutrition  [facility]        HPI:   Ms. Fernandez is 47 yo F with significant PMH of melanoma unknown primary (2017), s/p left axillary LN dissection (2017, Dr. Cool) in remission, history of adjuvant nivolumab induced colitis in 2018 treated with remicade (x1 at [place]) and Entyvio, h/o c.diff (recent bout, started PO vanc 4/7), seronegative RA on prednisone & tocilizumab, steroid induced DM, history of cushings, chronic pain, relatively new diagnosis of UC, received Entyvio x2 (most recent infusions on 5/12, 5/26), mostly steroid dependent x3 years.  Pt had recent readmissions for hematochezia, acute on chronic  abdominal & rectal pain, nausea for refractory UC and concerns regarding risks associated with melanoma recurrence with using standard UC maintenance medications.  She is now status post total abdominal colectomy and end ileostomy on 6/15/2021 with Dr. Ram.    Patient is feeling well, some pain but over all feeling well after surgery. Output has been stable.     FINAL DIAGNOSIS:   COLON, RESECTION:   - Moderate chronic active colitis with crypt abscess formation (Swapna   grade 3)   - Negative for dysplasia and malignancy   - Viable surgical margins   - Two benign lymph nodes (0/2)     ROS:  Complete 10 System ROS performed. All are negative except as documented below, in the HPI, or in patient questionnaire from today's visit.    PERTINENT PAST MEDICAL HISTORY:  Past Medical History:   Diagnosis Date     Abnormal MRI     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      Anxiety      Basal cell carcinoma      Cervical high risk HPV (human papillomavirus) test positive 2019    See problem list     Colitis      Depression      Diabetes mellitus, iatrogenic (H) 2020     Inflammatory arthritis      Insomnia      Intestinal giardiasis 3/5/2018     Lumbago     left lower back pain     Lymphedema      Malignant melanoma (H)      Melanoma (H) 10/23/2017     Mild persistent asthma      Obesity      STORMY (obstructive sleep apnea)      Prothrombin deficiency (H)     takes 81mg asa daily     Stroke (cerebrum) (H)     During      TIA (transient ischemic attack)      Type 2 diabetes mellitus (H)        PREVIOUS SURGERIES:  Past Surgical History:   Procedure Laterality Date     APPENDECTOMY       COLONOSCOPY N/A 10/18/2017    Procedure: COLONOSCOPY;  Colon;  Surgeon: Debbie Stephens MD;  [facility]     COLONOSCOPY N/A 3/9/2018    Procedure: COMBINED COLONOSCOPY, SINGLE OR MULTIPLE BIOPSY/POLYPECTOMY BY BIOPSY;  colon;  Surgeon: Benita Schumacher MD;  Location: [facility]     COLONOSCOPY       multiple since 2018 to present - about 6 total     DISSECT LYMPH NODE AXILLA Left 10/23/2017    Procedure: DISSECT LYMPH NODE AXILLA;  Left Axillary Lymph Node Dissection ;  Surgeon: Laurent Cool MD;  [facility]     EXAM UNDER ANESTHESIA PELVIC N/A 2020    Procedure: EXAM UNDER ANESTHESIA, PELVIS; with Cervical Biopsies, Vaginal Biopsy and Endocervical Curettings;  Surgeon: Melina Jung MD;  Location: [facility] OR     GYN SURGERY  ,          LAPAROSCOPIC ASSISTED COLECTOMY N/A 6/15/2021    Procedure: laparoscopic total abdominal colectomy, end ileostomy;  Surgeon: Quinton Ram MD;  Location: [facility] OR     REPAIR MOHS Left 2017    Procedure: REPAIR MOHS;  Left Upper Lid Moh's Reconstruction;  Surgeon: Kisah Bosch MD;  [facility]       ALLERGIES:     Allergies   Allergen Reactions     Bee Venom Swelling     Azithromycin Diarrhea     Erythromycin      Other reaction(s): GI intolerance, Vomiting     Fentanyl Other (See Comments)     sweating  sweating     Prochlorperazine Fatigue     Other reaction(s): Other (see comments)  Fatigue     Buspirone      Other reaction(s): GI intolerance  vomiting     Erythrocin Nausea and Vomiting     Zithromax [Azithromycin Dihydrate] Diarrhea     Enbrel [Etanercept] Hives and Rash       PERTINENT MEDICATIONS:    Current Outpatient Medications:      acetaminophen (TYLENOL) 325 MG tablet, Take 3 tablets (975 mg) by mouth every 8 hours As scheduled for the next 7-10 days, then decrease both dose & frequency to as needed there after., Disp: 90 tablet, Rfl: 0     Alcohol Swabs (ALCOHOL PREP) 70 % PADS, 1 applicator 3 times daily, Disp: 100 each, Rfl: 0     amLODIPine (NORVASC) 5 MG tablet, Take 1 tablet (5 mg) by mouth daily Needs follow up visit before next refill due, Disp: 30 tablet, Rfl: 0     blood glucose (NO BRAND SPECIFIED) lancets standard, Use to test blood sugar 3 times daily or as directed., Disp: 1 each, Rfl: 0     blood  glucose (NO BRAND SPECIFIED) test strip, Use to test blood sugar 3 times daily or as directed., Disp: 200 strip, Rfl: 0     Calcium Carb-Cholecalciferol 600-800 MG-UNIT TABS, Take 800 mg by mouth every morning (before breakfast), Disp: 90 tablet, Rfl: 3     calcium carbonate (TUMS) 500 MG chewable tablet, Take 1 tablet (500 mg) by mouth 3 times daily as needed for heartburn, Disp: 30 tablet, Rfl: 0     enoxaparin ANTICOAGULANT (LOVENOX) 40 MG/0.4ML syringe, Inject 0.4 mLs (40 mg) Subcutaneous every 12 hours for 26 days, Disp: 20.8 mL, Rfl: 0     gabapentin (NEURONTIN) 250 MG/5ML solution, Take 18 mLs (900 mg) by mouth 4 times daily, Disp: 2160 mL, Rfl: 5     gabapentin (NEURONTIN) 300 MG capsule, TAKE THREE CAPSULES BY MOUTH FOUR TIMES A DAY, Disp: 360 capsule, Rfl: 0     HYDROmorphone (DILAUDID) 2 MG tablet, Take 1 tablet (2 mg) by mouth every 6 hours as needed for moderate to severe pain, Disp: 10 tablet, Rfl: 0     hydrOXYzine (ATARAX) 25 MG tablet, Take 1 tablet (25 mg) by mouth every 6 hours as needed for anxiety, Disp: 40 tablet, Rfl: 0     insulin glargine (LANTUS PEN) 100 UNIT/ML pen, Inject 4 Units Subcutaneous At Bedtime, Disp:  , Rfl:      insulin lispro (HUMALOG KWIKPEN) 100 UNIT/ML (1 unit dial) KWIKPEN, Inject 1-7 Units Subcutaneous 4 times daily (with meals and nightly) Per the insulin sliding scale provided in the discharge summary., Disp: 3 mL, Rfl: 2     metFORMIN (GLUCOPHAGE) 500 MG/5ML SOLN solution, Take 10 mLs (1,000 mg) by mouth daily (with breakfast), Disp: 473 mL, Rfl: 5     metFORMIN (GLUCOPHAGE-XR) 500 MG 24 hr tablet, Take 1,000 mg by mouth Daily with breakfast., Disp: , Rfl:      oxyCODONE (ROXICODONE) 5 MG tablet, Take 5 mg by mouth every 4 hours as needed for moderate to severe pain, Disp: , Rfl:      pantoprazole (PROTONIX) 40 MG EC tablet, Take 1 tablet (40 mg) by mouth every morning (before breakfast) Needs follow up visit before next refill due, Disp: 30 tablet, Rfl: 0      predniSONE (DELTASONE) 20 MG tablet, Take 1 tablet (20 mg) by mouth daily Prednisone 20mg daily x5 days, then decrease to prednisone 10mg daily x5 days, then decrease to prednisone 5mg daily x5 days, then decrease to prednisone 5mg every other day for 5 days, then off., Disp:  , Rfl:      rosuvastatin (CRESTOR) 10 MG tablet, Take 1 tablet (10 mg) by mouth daily, Disp: 90 tablet, Rfl: 3     simethicone (MYLICON) 80 MG chewable tablet, Take 1-2 tablets ( mg) by mouth 4 times daily as needed for cramping, Disp: 120 tablet, Rfl: 0     sulfamethoxazole-trimethoprim (BACTRIM) 400-80 MG tablet, Take 1 tablet by mouth daily, Disp: 120 tablet, Rfl: 0     venlafaxine (EFFEXOR) 15 mg/mL suspension, Take 5 mLs (75 mg) by mouth 3 times daily, Disp: 450 mL, Rfl: 5     venlafaxine (EFFEXOR-ER) 225 MG 24 hr tablet, TAKE ONE TABLET BY MOUTH ONCE DAILY, Disp: 90 tablet, Rfl: 1     VITAMIN D3 25 MCG (1000 UT) tablet, TAKE THREE TABLETS BY MOUTH ONCE DAILY, Disp: 300 tablet, Rfl: 1     VITRON-C  MG TABS tablet, Take 1 tablet by mouth daily, Disp: 30 tablet, Rfl: 1  No current facility-administered medications for this visit.    Facility-Administered Medications Ordered in Other Visits:      lidocaine 1 % 9 mL, 9 mL, Intradermal, Once, Anna Naidu MD     sodium bicarbonate 8.4 % injection 1 mEq, 1 mEq, Intradermal, Once, Anna Naidu MD    SOCIAL HISTORY:  Social History     Socioeconomic History     Marital status:      Spouse name: Not on file     Number of children: Not on file     Years of education: Not on file     Highest education level: Not on file   Occupational History     Not on file   Tobacco Use     Smoking status: Former Smoker     Packs/day: 1.00     Years: 5.00     Pack years: 5.00     Quit date: 3/20/1998     Years since quittin.3     Smokeless tobacco: Never Used   Substance and Sexual Activity     Alcohol use: Yes     Comment: occ     Drug use: No     Sexual activity: Not Currently      Partners: Male     Birth control/protection: Surgical   Other Topics Concern     Parent/sibling w/ CABG, MI or angioplasty before 65F 55M? No   Social History Narrative    20 y.o- patient's mother   of lung cancer. She had to take care of her younger sister.    2012- patient's  had a heart attack with stents placed, followed by cardiac rehabilitation    2000 TO 2012  was in [facility] for depression    2013 patient's  went through alcohol rehabilitation at [place] inpatient            They have attended couple counseling a couple of times and patient went to the family program for chemical dependency.    Patient denies alcohol or drug use and herself            Has 2 children, girls ages 10 and 13     For a while she was working 3 jobs since her  was ill. 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 Reports her job is very stressful.         Social Determinants of Health     Financial Resource Strain: High Risk     Difficulty of Paying Living Expenses: Very hard   Food Insecurity: No Food Insecurity     Worried About Running Out of Food in the Last Year: Never true     Ran Out of Food in the Last Year: Never true   Transportation Needs: No Transportation Needs     Lack of Transportation (Medical): No     Lack of Transportation (Non-Medical): No   Physical Activity:      Days of Exercise per Week:      Minutes of Exercise per Session:    Stress: Stress Concern Present     Feeling of Stress : Very much   Social Connections: Unknown     Frequency of Communication with Friends and Family: Not asked     Frequency of Social Gatherings with Friends and Family: Not asked     Attends Jewish Services: Not asked     Active Member of Clubs or Organizations: Not asked     Attends Club or Organization Meetings: Not asked     Marital Status:    Intimate Partner Violence: Unknown     Fear of Current or Ex-Partner: Not asked      Emotionally Abused: Not asked     Physically Abused: Not asked     Sexually Abused: Not asked       FAMILY HISTORY:  Father with history of colon polyps.  Paternal grandmother with history of colon cancer in her 50s or 60s.  Father did also history of diverticulitis as well as IBS.  Her child has been diagnosed with IBS.  No history of Crohn's disease.  No history of ulcerative colitis.   Family History   Problem Relation Age of Onset     Cancer Mother 45        lung     Neurologic Disorder Mother         epilepsy     Lipids Father      Gastrointestinal Disease Father         diverticulitis      Depression Father      Colitis Father      Colon Cancer Father      Diverticulitis Father      Cancer Maternal Grandmother      Blood Disease Maternal Grandmother         lymphoma      Arthritis Maternal Grandmother      Diabetes Maternal Grandmother      Depression Maternal Grandmother      Macular Degeneration Maternal Grandmother      Glaucoma Maternal Grandmother      Diabetes Maternal Grandfather      Cerebrovascular Disease Maternal Grandfather      Blood Disease Maternal Grandfather      Heart Disease Maternal Grandfather      Glaucoma Maternal Grandfather      Cancer Paternal Grandmother      Cancer - colorectal Paternal Grandmother      Colitis Paternal Grandmother      Colon Cancer Paternal Grandmother      Diverticulitis Paternal Grandmother      Respiratory Paternal Grandfather         emphysema      Colitis Paternal Grandfather      Colon Cancer Paternal Grandfather      Diverticulitis Paternal Grandfather      Heart Disease Daughter      Asthma Daughter      Depression Sister      Melanoma No family hx of        Past/family/social history reviewed and no changes    PHYSICAL EXAMINATION:  Constitutional: aaox3, cooperative, pleasant, not dyspneic/diaphoretic, no acute distress  Vitals reviewed: Wt 108.4 kg (239 lb)   BMI 42.34 kg/m    Wt:   Wt Readings from Last 2 Encounters:   07/12/21 108.4 kg (239 lb)    06/30/21 106.6 kg (235 lb)      Constitutional - general appearance is well and in no acute distress. Body habitus normal  Eyes - No redness or discharge  Respiratory - No cough, unlabored breathing  Musculoskeletal - range of motion intact: Neck and arms  Skin - No discoloration or lesions  Neurological - No tremors, headaches  Psychiatric - No anxiety, alert & oriented      PERTINENT STUDIES:  Most recent CBC:  Recent Labs   Lab Test 06/18/21  0854 06/17/21  0805 06/16/21  0811   WBC  --  11.3* 14.3*   HGB  --  11.5* 11.7   HCT  --  35.7 35.5    222 201     Most recent hepatic panel:  Recent Labs   Lab Test 06/04/21  0750 06/03/21  1224   ALT 42 49   AST 12 10     Most recent creatinine:  Recent Labs   Lab Test 06/17/21  0805 06/16/21  0811   CR 0.65 0.67

## 2021-07-12 NOTE — LETTER
"    7/12/2021         RE: Doretha Fernandez  [id number] [facility] [address]        Dear Colleague,    Thank you for referring your patient, Doretha Fernandez, to the [facility] [place]. Please see a copy of my visit note below.    Doretha Fernandez is a 46 year old female who is being evaluated via a billable video visit.      The patient has been notified of following:     \"This video visit will be conducted via a call between you and your physician/provider. We have found that certain health care needs can be provided without the need for an in-person physical exam.  This service lets us provide the care you need with a video conversation.  If a prescription is necessary we can send it directly to your pharmacy.  If lab work is needed we can place an order for that and you can then stop by our lab to have the test done at a later time.    If during the course of the call the physician/provider feels a video visit is not appropriate, you will not be charged for this service.\"     Patient confirmed that they are in [place] for today's visit no. Patient is currently in [place] visiting.    Video-Visit Details  Type of service:  Video Visit     Video Start Time: 1:38 PM  Video End Time:  1:54 PM    Originating Location (pt. Location): Home    Distant Location (provider location):  [facility] [place]     Platform used: Allovue    IBD CLINIC VISIT     CC/REFERRING MD:  [facility]  REASON FOR FOLLOW UP: UC proctosigmoiditis  COLLABORATING PHYSICIAN: Renard Davey    IBD HISTORY  Age at diagnosis: 2021  Extent of disease: proctosigmoid   Current UC medications: None  Prior UC surgeries: total abdominal colectomy and end ileostomy 6/15/21   Prior IBD Medications:   Infliximab, 1 dose in the hospital good response  Vedolizumab, completed induction dosing  Prednisone, minimal response to high-dose oral and IV with significant weight gain  5-ASA, no " response  Topical therapies to include enemas, no response    DRUG MONITORING  TPMT enzyme activity: --    6-TGN/6-MMPN levels: --    Biologic concentration:--    DISEASE ASSESSMENT  Labs:  Lab Results   Component Value Date    ALBUMIN 2.8 05/07/2021     Recent Labs   Lab Test 06/09/21  0626 06/08/21  0623 06/03/21 2040 05/26/21  1320   CRP 5.3 7.5 25.0* 6.0   SED  --   --  9 5     Endoscopic assessment:   Colon path:  FINAL DIAGNOSIS:   COLON, RESECTION:   - Moderate chronic active colitis with crypt abscess formation (Swapna   grade 3)   - Negative for dysplasia and malignancy   - Viable surgical margins   - Two benign lymph nodes (0/2)   Enterography: --  Fecal calprotectin: --   C diff: 3/2021 treated with vanco taper (at [place])     IBDQ Score Date IBDQ - Total Score  (Higher score better)   4/10/2018 32     ASSESSMENT/PLAN  Ms. Fernandez is 45 yo F her for follow up following colectomy    1.  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 She continues to follow closely with colorectal surgery at this time.     2.  Seronegative RA: Patient has followed with rheumatology.  She is working on getting off of prednisone. I suggested she consider working with endocrinology regarding the best plan for taper off.     3.  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     Thank you for this consultation.  It was a pleasure to participate in the care of this patient; please contact us with any further questions.      Zeferino Sanchez PA-C  Division of Gastroenterology, Hepatology and Nutrition  [facility]        HPI:   Ms. Fernandez is 45 yo F with significant PMH of melanoma unknown primary (2017), s/p left axillary LN dissection (2017, Dr. Cool) in remission, history of adjuvant nivolumab induced colitis in 2018 treated with remicade (x1 at [place]) and Entyvio, h/o c.diff (recent bout, started PO vanc 4/7), seronegative RA on prednisone  & tocilizumab, steroid induced DM, history of cushings, chronic pain, relatively new diagnosis of UC, received Entyvio x2 (most recent infusions on , ), mostly steroid dependent x3 years.  Pt had recent readmissions for hematochezia, acute on chronic abdominal & rectal pain, nausea for refractory UC and concerns regarding risks associated with melanoma recurrence with using standard UC maintenance medications.  She is now status post total abdominal colectomy and end ileostomy on 6/15/2021 with Dr. Ram.    Patient is feeling well, some pain but over all feeling well after surgery. Output has been stable.     FINAL DIAGNOSIS:   COLON, RESECTION:   - Moderate chronic active colitis with crypt abscess formation (Swapna   grade 3)   - Negative for dysplasia and malignancy   - Viable surgical margins   - Two benign lymph nodes (0/2)     ROS:  Complete 10 System ROS performed. All are negative except as documented below, in the HPI, or in patient questionnaire from today's visit.    PERTINENT PAST MEDICAL HISTORY:  Past Medical History:   Diagnosis Date     Abnormal MRI     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      Anxiety      Basal cell carcinoma      Cervical high risk HPV (human papillomavirus) test positive 2019    See problem list     Colitis      Depression      Diabetes mellitus, iatrogenic (H) 2020     Inflammatory arthritis      Insomnia      Intestinal giardiasis 3/5/2018     Lumbago     left lower back pain     Lymphedema      Malignant melanoma (H)      Melanoma (H) 10/23/2017     Mild persistent asthma      Obesity      STORMY (obstructive sleep apnea)      Prothrombin deficiency (H)     takes 81mg asa daily     Stroke (cerebrum) (H)     During      TIA (transient ischemic attack)      Type 2 diabetes mellitus (H)        PREVIOUS SURGERIES:  Past Surgical History:   Procedure Laterality Date     APPENDECTOMY  2004     COLONOSCOPY N/A 10/18/2017     Procedure: COLONOSCOPY;  Colon;  Surgeon: Debbie Stephens MD;  [facility]     COLONOSCOPY N/A 3/9/2018    Procedure: COMBINED COLONOSCOPY, SINGLE OR MULTIPLE BIOPSY/POLYPECTOMY BY BIOPSY;  colon;  Surgeon: Benita Schumacher MD;  Location: [facility]     COLONOSCOPY      multiple since  to present - about 6 total     DISSECT LYMPH NODE AXILLA Left 10/23/2017    Procedure: DISSECT LYMPH NODE AXILLA;  Left Axillary Lymph Node Dissection ;  Surgeon: Laurent Cool MD;  [facility]     EXAM UNDER ANESTHESIA PELVIC N/A 2020    Procedure: EXAM UNDER ANESTHESIA, PELVIS; with Cervical Biopsies, Vaginal Biopsy and Endocervical Curettings;  Surgeon: Melina Jung MD;  Location: [facility] OR     GYN SURGERY  ,          LAPAROSCOPIC ASSISTED COLECTOMY N/A 6/15/2021    Procedure: laparoscopic total abdominal colectomy, end ileostomy;  Surgeon: Quinton Ram MD;  Location: [facility]     REPAIR MOHS Left 2017    Procedure: REPAIR MOHS;  Left Upper Lid Moh's Reconstruction;  Surgeon: Kisha Bosch MD;  [facility]       ALLERGIES:     Allergies   Allergen Reactions     Bee Venom Swelling     Azithromycin Diarrhea     Erythromycin      Other reaction(s): GI intolerance, Vomiting     Fentanyl Other (See Comments)     sweating  sweating     Prochlorperazine Fatigue     Other reaction(s): Other (see comments)  Fatigue     Buspirone      Other reaction(s): GI intolerance  vomiting     Erythrocin Nausea and Vomiting     Zithromax [Azithromycin Dihydrate] Diarrhea     Enbrel [Etanercept] Hives and Rash       PERTINENT MEDICATIONS:    Current Outpatient Medications:      acetaminophen (TYLENOL) 325 MG tablet, Take 3 tablets (975 mg) by mouth every 8 hours As scheduled for the next 7-10 days, then decrease both dose & frequency to as needed there after., Disp: 90 tablet, Rfl: 0     Alcohol Swabs (ALCOHOL PREP) 70 % PADS, 1 applicator 3 times daily, Disp: 100 each, Rfl: 0     amLODIPine (NORVASC)  5 MG tablet, Take 1 tablet (5 mg) by mouth daily Needs follow up visit before next refill due, Disp: 30 tablet, Rfl: 0     blood glucose (NO BRAND SPECIFIED) lancets standard, Use to test blood sugar 3 times daily or as directed., Disp: 1 each, Rfl: 0     blood glucose (NO BRAND SPECIFIED) test strip, Use to test blood sugar 3 times daily or as directed., Disp: 200 strip, Rfl: 0     Calcium Carb-Cholecalciferol 600-800 MG-UNIT TABS, Take 800 mg by mouth every morning (before breakfast), Disp: 90 tablet, Rfl: 3     calcium carbonate (TUMS) 500 MG chewable tablet, Take 1 tablet (500 mg) by mouth 3 times daily as needed for heartburn, Disp: 30 tablet, Rfl: 0     enoxaparin ANTICOAGULANT (LOVENOX) 40 MG/0.4ML syringe, Inject 0.4 mLs (40 mg) Subcutaneous every 12 hours for 26 days, Disp: 20.8 mL, Rfl: 0     gabapentin (NEURONTIN) 250 MG/5ML solution, Take 18 mLs (900 mg) by mouth 4 times daily, Disp: 2160 mL, Rfl: 5     gabapentin (NEURONTIN) 300 MG capsule, TAKE THREE CAPSULES BY MOUTH FOUR TIMES A DAY, Disp: 360 capsule, Rfl: 0     HYDROmorphone (DILAUDID) 2 MG tablet, Take 1 tablet (2 mg) by mouth every 6 hours as needed for moderate to severe pain, Disp: 10 tablet, Rfl: 0     hydrOXYzine (ATARAX) 25 MG tablet, Take 1 tablet (25 mg) by mouth every 6 hours as needed for anxiety, Disp: 40 tablet, Rfl: 0     insulin glargine (LANTUS PEN) 100 UNIT/ML pen, Inject 4 Units Subcutaneous At Bedtime, Disp:  , Rfl:      insulin lispro (HUMALOG KWIKPEN) 100 UNIT/ML (1 unit dial) KWIKPEN, Inject 1-7 Units Subcutaneous 4 times daily (with meals and nightly) Per the insulin sliding scale provided in the discharge summary., Disp: 3 mL, Rfl: 2     metFORMIN (GLUCOPHAGE) 500 MG/5ML SOLN solution, Take 10 mLs (1,000 mg) by mouth daily (with breakfast), Disp: 473 mL, Rfl: 5     metFORMIN (GLUCOPHAGE-XR) 500 MG 24 hr tablet, Take 1,000 mg by mouth Daily with breakfast., Disp: , Rfl:      oxyCODONE (ROXICODONE) 5 MG tablet, Take 5 mg by  mouth every 4 hours as needed for moderate to severe pain, Disp: , Rfl:      pantoprazole (PROTONIX) 40 MG EC tablet, Take 1 tablet (40 mg) by mouth every morning (before breakfast) Needs follow up visit before next refill due, Disp: 30 tablet, Rfl: 0     predniSONE (DELTASONE) 20 MG tablet, Take 1 tablet (20 mg) by mouth daily Prednisone 20mg daily x5 days, then decrease to prednisone 10mg daily x5 days, then decrease to prednisone 5mg daily x5 days, then decrease to prednisone 5mg every other day for 5 days, then off., Disp:  , Rfl:      rosuvastatin (CRESTOR) 10 MG tablet, Take 1 tablet (10 mg) by mouth daily, Disp: 90 tablet, Rfl: 3     simethicone (MYLICON) 80 MG chewable tablet, Take 1-2 tablets ( mg) by mouth 4 times daily as needed for cramping, Disp: 120 tablet, Rfl: 0     sulfamethoxazole-trimethoprim (BACTRIM) 400-80 MG tablet, Take 1 tablet by mouth daily, Disp: 120 tablet, Rfl: 0     venlafaxine (EFFEXOR) 15 mg/mL suspension, Take 5 mLs (75 mg) by mouth 3 times daily, Disp: 450 mL, Rfl: 5     venlafaxine (EFFEXOR-ER) 225 MG 24 hr tablet, TAKE ONE TABLET BY MOUTH ONCE DAILY, Disp: 90 tablet, Rfl: 1     VITAMIN D3 25 MCG (1000 UT) tablet, TAKE THREE TABLETS BY MOUTH ONCE DAILY, Disp: 300 tablet, Rfl: 1     VITRON-C  MG TABS tablet, Take 1 tablet by mouth daily, Disp: 30 tablet, Rfl: 1  No current facility-administered medications for this visit.    Facility-Administered Medications Ordered in Other Visits:      lidocaine 1 % 9 mL, 9 mL, Intradermal, Once, Anna Naidu MD     sodium bicarbonate 8.4 % injection 1 mEq, 1 mEq, Intradermal, Once, Anna Naidu MD    SOCIAL HISTORY:  Social History     Socioeconomic History     Marital status:      Spouse name: Not on file     Number of children: Not on file     Years of education: Not on file     Highest education level: Not on file   Occupational History     Not on file   Tobacco Use     Smoking status: Former Smoker     Packs/day:  1.00     Years: 5.00     Pack years: 5.00     Quit date: 3/20/1998     Years since quittin.3     Smokeless tobacco: Never Used   Substance and Sexual Activity     Alcohol use: Yes     Comment: occ     Drug use: No     Sexual activity: Not Currently     Partners: Male     Birth control/protection: Surgical   Other Topics Concern     Parent/sibling w/ CABG, MI or angioplasty before 65F 55M? No   Social History Narrative    20 y.o- patient's mother   of lung cancer. She had to take care of her younger sister.    2012- patient's  had a heart attack with stents placed, followed by cardiac rehabilitation    2000 TO 2012  was in [facility] for depression    2013 patient's  went through alcohol rehabilitation at [facility]            They have attended couple counseling a couple of times and patient went to the family program for chemical dependency.    Patient denies alcohol or drug use and herself            Has 2 children, girls ages 10 and 13     For a while she was working 3 jobs since her  was ill. 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 Reports her job is very stressful.         Social Determinants of Health     Financial Resource Strain: High Risk     Difficulty of Paying Living Expenses: Very hard   Food Insecurity: No Food Insecurity     Worried About Running Out of Food in the Last Year: Never true     Ran Out of Food in the Last Year: Never true   Transportation Needs: No Transportation Needs     Lack of Transportation (Medical): No     Lack of Transportation (Non-Medical): No   Physical Activity:      Days of Exercise per Week:      Minutes of Exercise per Session:    Stress: Stress Concern Present     Feeling of Stress : Very much   Social Connections: Unknown     Frequency of Communication with Friends and Family: Not asked     Frequency of Social Gatherings with Friends and Family: Not asked      Attends Druze Services: Not asked     Active Member of Clubs or Organizations: Not asked     Attends Club or Organization Meetings: Not asked     Marital Status:    Intimate Partner Violence: Unknown     Fear of Current or Ex-Partner: Not asked     Emotionally Abused: Not asked     Physically Abused: Not asked     Sexually Abused: Not asked       FAMILY HISTORY:  Father with history of colon polyps.  Paternal grandmother with history of colon cancer in her 50s or 60s.  Father did also history of diverticulitis as well as IBS.  Her child has been diagnosed with IBS.  No history of Crohn's disease.  No history of ulcerative colitis.   Family History   Problem Relation Age of Onset     Cancer Mother 45        lung     Neurologic Disorder Mother         epilepsy     Lipids Father      Gastrointestinal Disease Father         diverticulitis      Depression Father      Colitis Father      Colon Cancer Father      Diverticulitis Father      Cancer Maternal Grandmother      Blood Disease Maternal Grandmother         lymphoma      Arthritis Maternal Grandmother      Diabetes Maternal Grandmother      Depression Maternal Grandmother      Macular Degeneration Maternal Grandmother      Glaucoma Maternal Grandmother      Diabetes Maternal Grandfather      Cerebrovascular Disease Maternal Grandfather      Blood Disease Maternal Grandfather      Heart Disease Maternal Grandfather      Glaucoma Maternal Grandfather      Cancer Paternal Grandmother      Cancer - colorectal Paternal Grandmother      Colitis Paternal Grandmother      Colon Cancer Paternal Grandmother      Diverticulitis Paternal Grandmother      Respiratory Paternal Grandfather         emphysema      Colitis Paternal Grandfather      Colon Cancer Paternal Grandfather      Diverticulitis Paternal Grandfather      Heart Disease Daughter      Asthma Daughter      Depression Sister      Melanoma No family hx of        Past/family/social history reviewed and no  changes    PHYSICAL EXAMINATION:  Constitutional: aaox3, cooperative, pleasant, not dyspneic/diaphoretic, no acute distress  Vitals reviewed: Wt 108.4 kg (239 lb)   BMI 42.34 kg/m    Wt:   Wt Readings from Last 2 Encounters:   07/12/21 108.4 kg (239 lb)   06/30/21 106.6 kg (235 lb)      Constitutional - general appearance is well and in no acute distress. Body habitus normal  Eyes - No redness or discharge  Respiratory - No cough, unlabored breathing  Musculoskeletal - range of motion intact: Neck and arms  Skin - No discoloration or lesions  Neurological - No tremors, headaches  Psychiatric - No anxiety, alert & oriented      PERTINENT STUDIES:  Most recent CBC:  Recent Labs   Lab Test 06/18/21  0854 06/17/21  0805 06/16/21  0811   WBC  --  11.3* 14.3*   HGB  --  11.5* 11.7   HCT  --  35.7 35.5    222 201     Most recent hepatic panel:  Recent Labs   Lab Test 06/04/21  0750 06/03/21  1224   ALT 42 49   AST 12 10     Most recent creatinine:  Recent Labs   Lab Test 06/17/21  0805 06/16/21  0811   CR 0.65 0.67                         Again, thank you for allowing me to participate in the care of your patient.        Sincerely,        Zeferino Sanchez PA-C

## 2021-07-12 NOTE — NURSING NOTE
Chief Complaint   Patient presents with     Follow Up       Vitals:    07/12/21 1311   Weight: 108.4 kg (239 lb)       Body mass index is 42.34 kg/m .    Lynne Gimenez CMA

## 2021-07-13 ENCOUNTER — PATIENT OUTREACH (OUTPATIENT)
Dept: NURSING | Facility: CLINIC | Age: 46
End: 2021-07-13
Payer: COMMERCIAL

## 2021-07-13 ASSESSMENT — ACTIVITIES OF DAILY LIVING (ADL): DEPENDENT_IADLS:: CLEANING;TRANSPORTATION

## 2021-07-13 NOTE — PROGRESS NOTES
Clinic Care Coordination Contact    Follow Up Progress Note   [facility]  Discharge Summary  Colon and Rectal Surgery      Doretha Fernandez MRN# [id number]   YOB: 1975 Age: 46 year old      Date of Admission:                      6/15/2021  Date of Discharge::                      6/19/2021  Admitting Physician:                    Quinton Ram MD  Discharge Physician:                   Quinton Ram MD  Primary Care Physician:        Anna Naidu           Admission Diagnoses:   Ulcerative colitis without complications, unspecified location (H) [K51.90]  Refractory ulcerative colitis  Anxiety      History of melanoma unknown primary 2017  History of nivolumab induced colitis  History of c.diff  Seronegative RA  Chronic steroid use  Steroid induced hyperglycemia  History of cushings  Chronic pain          Discharge Diagnosis:   Ulcerative colitis without complications, unspecified location (H) [K51.90]  Refractory ulcerative colitis     History of melanoma unknown primary 2017  History of nivolumab induced colitis  History of c.diff  Seronegative RA  Chronic steroid use  Steroid induced hyperglycemia  History of cushings  Chronic pain          Procedures:   6/15/2021: PROCEDURES PERFORMED:   1.  Laparoscopic total abdominal colectomy with end ileostomy.       Assessment:Patientis on the phone with her Vet right now and requests a follow up call on Friday 7/16/2021    Goals not discussed today      Intervention/Education provided during outreach:Introduced self as the new care coordinator      Outreach Frequency: 2 weeks    Plan:   Patient will keep her appointment tomorrow with the [facility] and FRW tomorrow and PCP visit  7/15   Care Coordinator will follow up 7/16/2021  [facility]   Gisel Marte RN, Care Coordinator   [facility]'s   E-mail [email or web address]    [phone]

## 2021-07-14 ENCOUNTER — PATIENT OUTREACH (OUTPATIENT)
Dept: CARE COORDINATION | Facility: CLINIC | Age: 46
End: 2021-07-14

## 2021-07-14 ENCOUNTER — APPOINTMENT (OUTPATIENT)
Dept: CARE COORDINATION | Facility: CLINIC | Age: 46
End: 2021-07-14
Payer: COMMERCIAL

## 2021-07-14 NOTE — PROGRESS NOTES
Clinic Care Coordination Contact  Program: [facility]  County: [facility] Case #:  [facility] Worker:   Jose #:   Subscriber #:   Renewal:  Date Applied:     FRW Outreach:  2021: FRW called patient for our scheduled phone appointment and left a vm with call back information. FRW will make outreach in 1-2 weeks.   2021: FRW called patient for our scheduled phone appointment and patient would like to reschedule. We rescheduled to 21 at 10:00 am. FRW will make outreach at that time.   2021: FRW called patient and we went through the combined application screening. They appear to be within the income guidelines o we scheduled a phone visit to apply on 21 at 10:00 am. FRW will make outreach at that time.   2021: Outreach attempted x 1. Left message on voicemail with call back information and requested return call.  Plan: FRW will call again within one week.     SNAP/CASH Application Screenin. Have you had [facility] benefits before? Years ago  2. How many people in the household, do you eat/buy food together? 3, herself, 2 daughters   3. What is your monthly income (include all tax members)? None hasn't worked since 21. Daughter, $13, 30 hours $1,690  4. Do you have a bank account? Checking and Savings,   5. Do you have any utility bills (electricity, rent, mortgage, phone, insurance, medical bills, etc.)? Mortgage $1451  6. Do you have social security cards and/or green cards? [place] Citizens       Health Insurance:    Major Programs  This subscriber has eligibility for [place]: Medical Assistance.  Elig Type AA: Parent of a dependent child  Eligibility Begin Date: 2020  Eligibility End Date: --/--/----  This subscriber is eligible for the following service types: Medical Care ,  Chiropractic ,  Dental Care ,  Hospital ,  Hospital - Inpatient ,  Hospital - Outpatient ,  Emergency Services ,  Pharmacy ,  Professional (Physician) Visit - Office ,  Vision (Optometry) ,  Mental Health ,   Urgent Care  Prepaid Health Plan  This subscriber receives MA12 - Prepaid Medical Assistance Program (PMAP) delivered through [employer]. The phone numbers is [phone] ().    Referral/Screening:    Is patient requesting help applying for [facility] benefits? Yes   Have you recently applied for any [facility] benefits? No   How many people in your household? 3   Do you buy/eat food together? Yes   What is the monthly gross income for the household (wages, social security, workers comp, and pension)?  --  Currently no income, has been using [place] savings   [place] for [place]? No  Pt currently has [employer] [place]   Is this an insurance renewal? No   Is this a new insurance application request? No   Is this a jordan care application? No   Any other information for the FRW? Pt having diff-  iculty getting  LTD through her  employer, has-  n't received a  pay check since  the first week  in April.       Financial Resource Worker Follow Up    Goals:   Goals Addressed as of 7/6/2021 at 9:08 AM                 Today      Financial Wellbeing (pt-stated)   20%    Added 6/28/21 by Polly Huerta, RN     Goal Statement: I will apply for [facility] Benefits within the next 1-2 weeks if I am eligible.   Date Goal Set:  6/28/2021   Barriers:  Having difficulty obtaining LTD through employer, recent abdominal surgery/hospitalization  Strengths:  Motivated, engaged in care coordination  Date to Achieve By:  7/12/2021  Patient expressed understanding of goal:  Yes  Action steps to achieve this goal:  1.  I understand a referral was placed to the Financial Resource Worker, I will receive a call within the next 3 business days.    2.  I understand the financial worker will make two attempts to call me. If I still need help with this goal, I will connect with my Community Health Worker Yesika at [phone].            Intervention and Education during outreach: n/a    FRW Plan: FRW will make outreach in  1-2 weeks

## 2021-07-15 ENCOUNTER — OFFICE VISIT (OUTPATIENT)
Dept: FAMILY MEDICINE | Facility: CLINIC | Age: 46
End: 2021-07-15
Payer: COMMERCIAL

## 2021-07-15 ENCOUNTER — TELEPHONE (OUTPATIENT)
Dept: FAMILY MEDICINE | Facility: CLINIC | Age: 46
End: 2021-07-15

## 2021-07-15 VITALS
SYSTOLIC BLOOD PRESSURE: 112 MMHG | OXYGEN SATURATION: 96 % | RESPIRATION RATE: 18 BRPM | HEIGHT: 63 IN | HEART RATE: 92 BPM | WEIGHT: 247 LBS | TEMPERATURE: 97.8 F | DIASTOLIC BLOOD PRESSURE: 82 MMHG | BODY MASS INDEX: 43.77 KG/M2

## 2021-07-15 DIAGNOSIS — I10 HYPERTENSION, UNSPECIFIED TYPE: ICD-10-CM

## 2021-07-15 DIAGNOSIS — E13.9 DIABETES MELLITUS, IATROGENIC (H): Primary | ICD-10-CM

## 2021-07-15 DIAGNOSIS — C43.9 METASTATIC MALIGNANT MELANOMA (H): ICD-10-CM

## 2021-07-15 DIAGNOSIS — N94.9 ADNEXAL FULLNESS: ICD-10-CM

## 2021-07-15 DIAGNOSIS — D68.52 PROTHROMBIN MUTATION (H): ICD-10-CM

## 2021-07-15 DIAGNOSIS — E24.2 DRUG-INDUCED CUSHING'S SYNDROME (H): ICD-10-CM

## 2021-07-15 DIAGNOSIS — K21.9 GASTROESOPHAGEAL REFLUX DISEASE WITHOUT ESOPHAGITIS: Chronic | ICD-10-CM

## 2021-07-15 DIAGNOSIS — Z90.49 S/P COLECTOMY: ICD-10-CM

## 2021-07-15 DIAGNOSIS — R87.810 CERVICAL HIGH RISK HPV (HUMAN PAPILLOMAVIRUS) TEST POSITIVE: ICD-10-CM

## 2021-07-15 DIAGNOSIS — Z93.3 COLOSTOMY IN PLACE (H): ICD-10-CM

## 2021-07-15 PROCEDURE — 99214 OFFICE O/P EST MOD 30 MIN: CPT | Performed by: FAMILY MEDICINE

## 2021-07-15 RX ORDER — METFORMIN HCL 500 MG
1000 TABLET, EXTENDED RELEASE 24 HR ORAL DAILY
Qty: 90 TABLET | Refills: 1 | Status: SHIPPED | OUTPATIENT
Start: 2021-07-15 | End: 2021-07-28 | Stop reason: ALTCHOICE

## 2021-07-15 RX ORDER — PANTOPRAZOLE SODIUM 40 MG/1
40 TABLET, DELAYED RELEASE ORAL
Qty: 90 TABLET | Refills: 3 | Status: SHIPPED | OUTPATIENT
Start: 2021-07-15 | End: 2022-08-15

## 2021-07-15 RX ORDER — AMLODIPINE BESYLATE 5 MG/1
5 TABLET ORAL DAILY
Qty: 90 TABLET | Refills: 3 | Status: SHIPPED | OUTPATIENT
Start: 2021-07-15 | End: 2022-05-18

## 2021-07-15 ASSESSMENT — MIFFLIN-ST. JEOR: SCORE: 1734.51

## 2021-07-15 ASSESSMENT — PAIN SCALES - GENERAL: PAINLEVEL: NO PAIN (0)

## 2021-07-15 NOTE — PROGRESS NOTES
Chloé-    patient tells me you recommended lymphedema therapy for her with homecare- I'm okay with this plan.    Let me know if you need more form me otherwise, I'll await the forms to sign.    Anna Naidu M.D.

## 2021-07-15 NOTE — PROGRESS NOTES
"    Assessment & Plan     Diabetes mellitus, iatrogenic (H)   control is fair, with reduction in prednisone should improve.  Patient also to work with nutritionist on diet/weight loss  - metFORMIN (GLUCOPHAGE-XR) 500 MG 24 hr tablet; Take 2 tablets (1,000 mg) by mouth daily Daily with breakfast.    Hypertension, unspecified type  Well controlled  - amLODIPine (NORVASC) 5 MG tablet; Take 1 tablet (5 mg) by mouth daily    Gastroesophageal reflux disease without esophagitis  Stable, well controlled  - pantoprazole (PROTONIX) 40 MG EC tablet; Take 1 tablet (40 mg) by mouth every morning (before breakfast)    Adnexal fullness  Bilateral adnexal fullness/cysts seen on CT scan in the hospital, US recommended as follow up - this is ordered  - US Pelvic Complete with Transvaginal; Future    Cervical high risk HPV (human papillomavirus) test positive  Due for pap (diagnostic)    Metastatic malignant melanoma (H)  Continues regular care with dermatology  Recent PET scan okay    Drug-induced Cushing's syndrome (H)       Prothrombin mutation (H)  On aspirin    S/P colectomy  Working on weight loss for potential takedown procedure in the future    Colostomy in place (H)   as above       BMI:   Estimated body mass index is 43.75 kg/m  as calculated from the following:    Height as of this encounter: 1.6 m (5' 3\").    Weight as of this encounter: 112 kg (247 lb).           No follow-ups on file.    Anna Naidu MD  [facility]   Doretha is a 46 year old who presents for the following health issues  accompanied by her self:    HPI     Chief Complaint   Patient presents with     Edema     Patient is here to follow-up on edema on left arm. Patient noticed some blistering and a red line and she previously had edema in the same spot. Patient had a fluid pocket that she tried moving around. Patient is looking for lymphedema order.     Medication Reconciliation     Was taken off gabapentin.      " "Medication Question     Patient would like to discuss metformin and insulin and see if she is able to go off any medication.      Medication Request     Patient would like to discuss stopping medication that she lno longer has to be on to see if it helps with pain.      Medication Problem     Patient concerned that after lovenox nightly injection she is bleeding from injection site.      Medication Reconciliation     only taking 500mg daily of metformin     Surgery for her colon resection/ostomy was 3.5 weeks ago.    Feels she needs to lose a lot of weigh to have ostomy takedown and is discouraged by this.  Meeting with nutritionist on 7/28/2021.    No low blood sugars.  Lowest was 120 fasting in the am.  Last HgbA1c one month ago was 7.5% so despite numbers being \"better\" we're not at the point of lowering her insulin dose.  She's been intolerant of more than 500 mg metformin daily.  As the prednisone tapers we may be able to back off on the insulin, particularly if she's having hypoglycemia.     She's on 5 mg prednisone every other day.   Going to 2.5 mg every other day soon.   Stopped the gabapentin by her rheumatologist   Specialists feel that the colitis was causing her arthritis, so now that the colectomy has been done, they feel the arthritis will improve.    Having edema of the left arm/hand.  Would like to have lymphedema therapy see her at home and homecare RN was going to arrange this.  Will have our RN call [facility] and give verbal order to get this started.       reviewed - patient overdue for follow up pap.  Had exam under anesthesia 2/2020 for abnormal pap and +HPV.  She is overdue for a yearly follow up pap (diagnostic) and will schedule that.     Patient is bothered by a broken tooth.  Has state insurance now and having a very hard time getting a oral surgeon appointment to get the tooth pulled.     Review of Systems   Constitutional, HEENT, cardiovascular, pulmonary, gi and gu systems are " "negative, except as otherwise noted.      Objective    /82   Pulse 92   Temp 97.8  F (36.6  C) (Tympanic)   Resp 18   Ht 1.6 m (5' 3\")   Wt 112 kg (247 lb)   SpO2 96%   BMI 43.75 kg/m    Body mass index is 43.75 kg/m .  Physical Exam   GENERAL: healthy, alert and no distress  NECK: no adenopathy, no asymmetry, masses, or scars and thyroid normal to palpation  RESP: lungs clear to auscultation - no rales, rhonchi or wheezes  CV: regular rate and rhythm, normal S1 S2, no S3 or S4, no murmur, click or rub, no peripheral edema and peripheral pulses strong  ABDOMEN: soft, nontender, no hepatosplenomegaly, no masses and bowel sounds normal  MS: lymphedema of the left upper extremity    Lab results reviewed from hospital                "

## 2021-07-15 NOTE — PATIENT INSTRUCTIONS
Our Clinic hours are:  Mondays    7:20 am - 7 pm  Tues -  Fri  7:20 am - 5 pm    Clinic Phone: [phone]    The clinic lab opens at 7:30 am Mon - Fri and appointments are required.    [facility]. [phone]  Monday  8 am - 7pm  Tues - Fri 8 am - 5:30 pm

## 2021-07-15 NOTE — TELEPHONE ENCOUNTER
Dr. Naidu sent staff message to Chloé Vidal RN with [facility], however they are unable to access Epic any longer.      Left message for Homecare RN to call us back to give below verbal orders for lymphedema treatment.    Rupal Fleming, RN

## 2021-07-16 ENCOUNTER — TELEPHONE (OUTPATIENT)
Dept: FAMILY MEDICINE | Facility: CLINIC | Age: 46
End: 2021-07-16

## 2021-07-16 DIAGNOSIS — F32.0 MILD MAJOR DEPRESSION (H): ICD-10-CM

## 2021-07-16 DIAGNOSIS — F41.1 ANXIETY STATE: Primary | ICD-10-CM

## 2021-07-16 PROBLEM — Z90.49 S/P COLECTOMY: Status: ACTIVE | Noted: 2021-07-16

## 2021-07-16 PROBLEM — Z93.3 COLOSTOMY IN PLACE (H): Status: ACTIVE | Noted: 2021-07-16

## 2021-07-16 RX ORDER — VENLAFAXINE HYDROCHLORIDE 225 MG/1
225 TABLET, EXTENDED RELEASE ORAL DAILY
Status: CANCELLED | OUTPATIENT
Start: 2021-07-16

## 2021-07-16 NOTE — TELEPHONE ENCOUNTER
Reason for Call:  Other prescription    Detailed comments: Pt states oral form of Effexor makes her nauseated, pharmacist states there is an immediate release pill form available, pt is requesting new prescription be sent for this.     Phone Number Patient can be reached at: Home number on file [phone] (home)    Best Time: any    Can we leave a detailed message on this number? YES    Call taken on 7/16/2021 at 3:28 PM by Chantel Lundy

## 2021-07-16 NOTE — TELEPHONE ENCOUNTER
Please see message below. Script pended for Effexor 225 mg. Routed to provider.  Karen Andres RN

## 2021-07-19 ENCOUNTER — TELEPHONE (OUTPATIENT)
Dept: FAMILY MEDICINE | Facility: CLINIC | Age: 46
End: 2021-07-19

## 2021-07-19 ENCOUNTER — PATIENT OUTREACH (OUTPATIENT)
Dept: NURSING | Facility: CLINIC | Age: 46
End: 2021-07-19
Payer: COMMERCIAL

## 2021-07-19 DIAGNOSIS — Z90.49 S/P COLECTOMY: Primary | ICD-10-CM

## 2021-07-19 DIAGNOSIS — E13.9 DIABETES MELLITUS, IATROGENIC (H): ICD-10-CM

## 2021-07-19 DIAGNOSIS — G89.29 OTHER CHRONIC PAIN: ICD-10-CM

## 2021-07-19 RX ORDER — VENLAFAXINE 75 MG/1
75 TABLET ORAL 3 TIMES DAILY
Qty: 90 TABLET | Refills: 5 | Status: SHIPPED | OUTPATIENT
Start: 2021-07-19 | End: 2022-02-09

## 2021-07-19 RX ORDER — GABAPENTIN 600 MG/1
600 TABLET ORAL 4 TIMES DAILY
Qty: 120 TABLET | Refills: 0 | Status: SHIPPED | OUTPATIENT
Start: 2021-07-19 | End: 2021-09-23

## 2021-07-19 RX ORDER — GABAPENTIN 300 MG/1
CAPSULE ORAL
Qty: 360 CAPSULE | Refills: 0 | Status: SHIPPED | OUTPATIENT
Start: 2021-07-19 | End: 2021-07-19 | Stop reason: DRUGHIGH

## 2021-07-19 ASSESSMENT — ACTIVITIES OF DAILY LIVING (ADL): DEPENDENT_IADLS:: CLEANING;TRANSPORTATION

## 2021-07-19 NOTE — PROGRESS NOTES
Clinic Care Coordination Contact    Follow Up Progress Note   [facility] (6/15/21 to 6/19/21) for ulcerative colitis, s/p lap total abdominal colectomy with end ileostomy on 6/15/21.        Assessment:   1.Patient reports she is having normal looking stool in ileostomy bag.  Patient states she has Annamarie RN [facility] visit today at 11:00  Patient sent a text to Annamarie she only has 2 bags left and no rings.  Patient was to have supplies mailed to her   2. Patient continues to test her blood sugars 3-4 times a day and the average has been 170.  Patient has been on insulin for 2 months  due to steroid induced diabetes .  3.  Patient is following a low fiber diet and has an appointment with the Nutritionist 7/28/201  4. Patient has not checked on [employer] benefits yet stating she is checking with HR for long term disability      Goals addressed this encounter:   Goals Addressed                    This Visit's Progress       Financial Wellbeing (pt-stated)   20%      Goal Statement: [place] within the next 1-2 weeks if I am eligible.   Date Goal Set:  6/28/2021   Barriers:  Having difficulty obtaining LTD through employer, recent abdominal surgery/hospitalization  Strengths:  Motivated, engaged in care coordination  Date to Achieve By:  7/12/2021  Patient expressed understanding of goal:  Yes  Action steps to achieve this goal:  1.  I understand a referral was placed to the Financial Resource Worker, I will receive a call within the next 3 business days.    2.  I understand the financial worker will make two attempts to call me. If I still need help with this goal, I will connect with my Community Health Worker Yesika at [phone].         Medical (pt-stated)   0%      Goal Statement:  I want to find an oral surgeon who can remove my cracked/fractured tooth within in the next 1-2 weeks, or ASAP.  Date Goal set:  6/28/2021   Barriers:  [place], [place]  Strengths:  motivated, strong self-advocate, engaged in care coordination  Date to Achieve By:  7/12/21  Patient expressed understanding of goal:  Yes  Action steps to achieve this goal:  1.  RN Care Coordinator will send information on dental resources to pt via [employer].  2.  I will review dental information from RN Care Coordinator and follow up with dental provider(s) directly to schedule appt ASAP  3.  I will let my care team know if I need additional resources          Medical (pt-stated)   50%      Goal statement: I will schedule and attend a follow up appointment with my PCP within the next 1-2 weeks, per my hospital discharge instructions.  Date goal set:  6/28/2021   Barriers: recent hospitalization for total abdominal colectomy with end ileostomy, multiple provider follow up appts  Strengths: motivated, self-advocate, engaged in self-care  Date to achieve by:  7/12/2021  Patient expressed understanding of goal: Yes  Action steps to achieve this goal:  1.  I will schedule a hospital follow up appointment with my primary care provider, Dr. Anna Naidu, via [employer] ASAP  2.  I will attend my appointment with PCP as scheduled.  3.  I will talk with my provider about a plan for my diabetes regimen as I wean down/off steroids, and discuss possible referral to an Endocrinologist and or Certified Diabetic Educator (CDE).  4.  I will follow up with my care team if I need assistance or additional resources             Intervention/Education provided during outreach: [facility] introduced self as the new CCRN      Outreach Frequency: 2 weeks    Plan:   CC RN will call [facility] home care and informed them patient has only 2 ileostomy bags and no rings(Spoke to Kaia at [facility] and she will e-mail CM to problem solve)  Care Coordinator will follow up in 1-2 weeks   [facility]   Gisel Marte RN, Care Coordinator   [facility]'s    E-mail [email or web address]   [phone]

## 2021-07-19 NOTE — TELEPHONE ENCOUNTER
Gabapentin was discontinued on 7/15/21.  Pt experienced great pain over entire body and went back on the Gabapentin.  Gabapentin oral suspension causes nausea and asking for Gabapentin IR tablets.  Pt is taking 18 mls 3x/day.  Advise.  Michael

## 2021-07-19 NOTE — TELEPHONE ENCOUNTER
Refilled. Should work with rheumatology (I think they were the ones who told her to stop the gabapentin) on weaning down.  Can start by cutting down one pill every 2-3 days.    Anna Naidu M.D.

## 2021-07-19 NOTE — TELEPHONE ENCOUNTER
Pt needs order to be for tablets not capsules.  The capsule casings are coming into her colostomy.  Tablets come in 600 mg and 800 mg dose only.  (600 mg x 6 tabs / day=3600mg/day. This is the dose she is taking).  Advise.  Michael

## 2021-07-19 NOTE — TELEPHONE ENCOUNTER
Prescription for venlafaxine 75 mg - take three times daily.  The immediate release pill needs to be taken more frequently.    Anna Naidu M.D.

## 2021-07-19 NOTE — TELEPHONE ENCOUNTER
Gabapentin is all immediate release as far as I know.  You could check with pharmacy on this.  They don't make an extended release which is why it is dosed three or four times a day.    Anna Naidu M.D.

## 2021-07-19 NOTE — TELEPHONE ENCOUNTER
Left message for patient on her cell phone    I will do 600 mg tablets and have her take this four times daily (down from 900 mg four times daily).  Goal would be to taper back on this dose over time.    Anna Naidu M.D.

## 2021-07-19 NOTE — TELEPHONE ENCOUNTER
Reason for Call:  Home Health Care    Annamarie with [facility] called regarding (reason for call): Please call with verbal orders - OK to leave message     Orders are needed for this patient.     Skilled Nursing: Lymphedema eval to address leg swelling     Pt Provider: Evangelista    Phone Number Homecare Nurse can be reached at: [phone]    Can we leave a detailed message on this number? YES    Phone number patient can be reached at: Home number on file [phone] (home)    Best Time: any    Call taken on 7/19/2021 at 11:22 AM by Jena Tony

## 2021-07-20 ENCOUNTER — OFFICE VISIT (OUTPATIENT)
Dept: WOUND CARE | Facility: CLINIC | Age: 46
End: 2021-07-20
Payer: COMMERCIAL

## 2021-07-20 ENCOUNTER — TELEPHONE (OUTPATIENT)
Dept: FAMILY MEDICINE | Facility: CLINIC | Age: 46
End: 2021-07-20

## 2021-07-20 ENCOUNTER — PATIENT OUTREACH (OUTPATIENT)
Dept: NURSING | Facility: CLINIC | Age: 46
End: 2021-07-20
Payer: COMMERCIAL

## 2021-07-20 ENCOUNTER — TELEPHONE (OUTPATIENT)
Dept: WOUND CARE | Facility: CLINIC | Age: 46
End: 2021-07-20

## 2021-07-20 DIAGNOSIS — Z93.3 COLOSTOMY IN PLACE (H): Primary | ICD-10-CM

## 2021-07-20 DIAGNOSIS — Z93.2 ILEOSTOMY STATUS (H): Primary | ICD-10-CM

## 2021-07-20 PROCEDURE — 99024 POSTOP FOLLOW-UP VISIT: CPT

## 2021-07-20 ASSESSMENT — ACTIVITIES OF DAILY LIVING (ADL): DEPENDENT_IADLS:: CLEANING;TRANSPORTATION

## 2021-07-20 NOTE — TELEPHONE ENCOUNTER
[facility] calling with verbal orders.    Lymphedema treatment 1 x a week for one week and 2 x a week x 4 weeks.     Verbal orders given.    Rupal Fleming RN

## 2021-07-20 NOTE — TELEPHONE ENCOUNTER
Pt didn't receive supplies and they weren't ordered for her. She is now using Keith, 2 piece, barrier 80534, flat, ring Adapt 7805. She has Ileostomy,   Stoma is protruding but her abdomen is creased.  Pt will come to clinic for assessment today. Patricia Sosa RN

## 2021-07-20 NOTE — PATIENT INSTRUCTIONS
Ileostomy Diet General guidelines:    For 4-6 weeks after surgery the intestine needs time to heal and you should eat foods that are low in fiber.   Foods to avoid for this period: whole grains, raw vegetables and fresh fruit.     When your colon has been bypassed or removed you have a risk of dehydration (the function of the colon is to absorb fluid). Sports or electrolyte beverages like Gatorade contain sodium and are recommended but in a diluted form. Diluted them with half potion of water.    After 4-6 weeks you can start a normal diet again, but introduce new foods one at the time. It helps to keep a notepad in the kitchen or bathroom for a food log; write down  which foods make your stool thicker and which make it more watery.    No two people will react the same to foods.      Take time to eat slowly and chew well, this avoids blockages and swallowing air.    Make sure to eat your meals at regular time so your intestines work regularly which helps with regular output    Avoid skipping meals or overeating which can cause blockage    If your stool is very thick you have a risk of obstruction.   Foods that make your output thinner: grape juice, apple juice and prune juice.     If your stool is very watery you may have difficulties pouching.  Foods that make your food thicker: raw apples, bananas, cheese, potatoes, pasta, rice, whole grain bread and peanut butter.   Use the thick-makers carefully! Fiberous foods can cause food blockages and should be consumed with a cup of fluid.    Foods that appear to have a risk for causing blockages:  Nuts and Seeds, Popcorn, Dried Fruit, Coconut, Raisins, Peas, Chinese Vegetables Vegetable Skins, Salad Greens, Celery, Coleslaw, Raw Pineapple, Mushrooms,   Relishes, Corn  You do not need to avoid these foods. Just eat small amounts and be sure to chew them well.    Odor and gas:  Ileostomy output typically does not have a lot of odor.   Foods that cause odors: Asparagus,  coffee, garlic, prunes, beans, cucumbers, green peppers, radishes, brussel sprouts, eggs, milk, turnips, cabbage, fish, onions, vitamins/certain drugs, alcoholic beverages.    Foods that help decrease odors:  Parsley, yogurt, cranberry juice, spinach.     Foods that produce gas:  Apples, soda, dairy products, onions, asparagus, drinking straws & chewing gum (you ll swallow more air), melons, beans, mushrooms, corn, broccoli, spinach, nuts, cabbage.    Products that help with gas and odors are Devrom Tablets (bismuth subgallate) can be used four times daily tohelp control the odors of gas but doesn't prevent gas. Avoiding disturbing foods is more sensable but if you must a couple of Devrom tablets right before you eat should do the trick.

## 2021-07-20 NOTE — PROGRESS NOTES
"WOC Ostomy Assessment  Patient comes to clinic for consultation regarding ostomy issues.    Ostomy care is provided by self   Procedure: Colectomy  Dx related to ostomy:UC  Consulted per Dr James Silverman    Subjective:  Patient's reason for visit: \"post op\"     The quantity of ostomy supplies needed by a beneficiary is determined  primarily by the type of ostomy, its location, its construction, and the condition of the skin surface  surrounding the stoma.    Objective:  Type: Ileostomy since 06/03/2021  Stoma: 25 mm  end normal-appearing, pink-red, round, moist, good turgor and slightly protruberant    Location: right lower quadrant     Complications: none   Mucutaneous junction:  intact     Peristomal skin: erythema and erosion of epidermis  and barrier is showing some signs of leakage 3-9 o'clock  Output: brown , thin and soft    Frequency of pouch change: 0-1 days. This is is not scheduled.   Received home care [facility] assessment after discharge:Yes    Current pouch system/supplies: Keith   two piece, cut to fit  Flat and Adapt barrier ring    Assessment: Patient has ileostomy after emergency admission.  She currently has home care but ran out of pouches and home care was not able to order her what she needs.  Her abdomen is round and stoma is only slightly protruding, she has leaking underneath the barrier possibly due to adept ring and not changing it often enough.    Intervention/Plan: .  Patient should change her pouch every other day until the erythema has resolved.  She will need the Sarah ring and slight convex barrier.  She is currently using the 2 piece system which does not come in a soft convex however I think that eventually that will be helpful which she should wear with a belt.  Skin breakdown will recover fast if pouch is changed daily for 2 or 3 days.  After that she should change her pouch 3 times per week routinely until more comfortable with the process.  At that time she can consider a " one-piece pouch  for now I gave her some 2 piece pouches that are slightly convex with  Sarah ring.  Will discuss with home care nurse.  I was not able to place order due to patient having an home care episode.  The following recommendations will be made. Precut 83315 barrier with tape edge  20321 pouch ,089640 Sarah ring 7760 adhesive remover, 7299 belt   Pt should change pouch 3xweek    [facility] Head nurse Case RN  Chloé [phone] discussed pouche recommednations. Pt has my number should issues arise     Return to clinic prn.        Patricia Sosa, RN  RN CWON

## 2021-07-20 NOTE — PROGRESS NOTES
Clinic Care Coordination Contact    Follow Up Progress Note      Assessment: Patient did not recieve colostomy supplies in the mail stating it was never ordered and fell through the cracks.  Patient has an appointment at 2:30 with Ostomy RN for problem solving and additional education   Goals addressed this encounter:   Goals Addressed                    This Visit's Progress       Financial Wellbeing (pt-stated)   20%      Goal Statement: I will apply for [employer] Benefits within the next 1-2 weeks if I am eligible.   Date Goal Set:  6/28/2021   Barriers:  Having difficulty obtaining LTD through employer, recent abdominal surgery/hospitalization  Strengths:  Motivated, engaged in care coordination  Date to Achieve By:  7/12/2021  Patient expressed understanding of goal:  Yes  Action steps to achieve this goal:  1.  I understand a referral was placed to the Financial Resource Worker, I will receive a call within the next 3 business days.    2.  I understand the financial worker will make two attempts to call me. If I still need help with this goal, I will connect with my Community Health Worker Yesika at [phone].         Healthy Eating (pt-stated)   40%      Goal Statement: I would like to see a nutritionist to learn about a low fiber diet and how to incorporate it with my diabetic diet, and participate in the nutrition study offered through the [employer] [facility] [employer], as informed to me during my recent hospitalization, within the next month.  Date Goal set:  6/28/2021  Barriers:  New ileostomy, recent abdominal surgery, complex medical history  Strengths:  Motivated, engaged with home care  Date to Achieve By:  7/28/2021  Patient expressed understanding of goal:  Yes  Action steps to achieve this goal:  1.  I will continue working with my home care RN on dietary education and learning foods that are safe to eat with my new ileostomy and diabetes  2.  I will talk with my Colon and Rectal Surgery team about seeing a nutritionist  and participating in the nutrition study through the [facility], at my upcoming appt on 6/30/21  3.  I will talk with my primary care provider about a referral to a nutritionist, if needed   4.  I will ask my care team for additional resources if needed         Medical (pt-stated)   0%      Goal Statement:  I want to find an oral surgeon who can remove my cracked/fractured tooth within in the next 1-2 weeks, or ASAP.  Date Goal set:  6/28/2021   Barriers:  [place] dental [place], [place] with [place] network [place]  Strengths:  motivated, strong self-advocate, engaged in care coordination  Date to Achieve By:  7/12/21  Patient expressed understanding of goal:  Yes  Action steps to achieve this goal:  1.  RN Care Coordinator will send information on dental resources to pt via [employer].  2.  I will review dental information from RN Care Coordinator and follow up with dental provider(s) directly to schedule appt ASAP  3.  I will let my care team know if I need additional resources          Medical (pt-stated)   50%      Goal statement: I will schedule and attend a follow up appointment with my PCP within the next 1-2 weeks, per my hospital discharge instructions.  Date goal set:  6/28/2021   Barriers: recent hospitalization for total abdominal colectomy with end ileostomy, multiple provider follow up appts  Strengths: motivated, self-advocate, engaged in self-care  Date to achieve by:  7/12/2021  Patient expressed understanding of goal: Yes  Action steps to achieve this goal:  1.  I will schedule a hospital follow up appointment with my primary care provider, Dr. Anna Naidu, via [employer] ASAP  2.  I will attend my appointment with PCP as scheduled.  3.  I will talk with my provider about a plan for my diabetes regimen as I wean down/off steroids, and discuss possible referral to an Endocrinologist and or Certified Diabetic Educator (CDE).  4.  I will follow up with my care team if  I need assistance or additional resources             Intervention/Education provided during outreach: [facility] page if questions      Outreach Frequency: 2 weeks    Plan:   Patient will bring ADA paperwork for the PCP to sign   Care Coordinator will follow up in 1-2 weeks     [facility]   Gisel Marte RN, Care Coordinator   [facility]'s   E-mail [email or web address]   [phone]

## 2021-07-21 ENCOUNTER — PATIENT OUTREACH (OUTPATIENT)
Dept: CARE COORDINATION | Facility: CLINIC | Age: 46
End: 2021-07-21

## 2021-07-21 NOTE — PROGRESS NOTES
Clinic Care Coordination Contact  Program: Count includes the [facility]  County: [facility] Case #:  [facility] Worker:   Irvingyamini #:   Subscriber #:   Renewal:  Date Applied:     FRW Outreach:  2021: FRW called patient and she states she was sent a paper combined application by the [facility]. She already filled it out and would like to try sending it in on her own but may need help. Patient requested FRW check in, in a couple of weeks to make sure she doesn't need assistance. FRW will make outreach in 2 weeks.   2021: FRW called patient for our scheduled phone appointment and left a vm with call back information. FRW will make outreach in 1-2 weeks.   2021: FRW called patient for our scheduled phone appointment and patient would like to reschedule. We rescheduled to 21 at 10:00 am. FRW will make outreach at that time.   2021: FRW called patient and we went through the combined application screening. They appear to be within the income guidelines o we scheduled a phone visit to apply on 21 at 10:00 am. FRW will make outreach at that time.   2021: Outreach attempted x 1. Left message on voicemail with call back information and requested return call.  Plan: FRW will call again within one week.     SNAP/CASH Application Screenin. Have you had county benefits before? Years ago  2. How many people in the household, do you eat/buy food together? 3, herself, 2 daughters   3. What is your monthly income (include all tax members)? None hasn't worked since 21. Daughter, $13, 30 hours $1,690  4. Do you have a bank account? Checking and Savings,   5. Do you have any utility bills (electricity, rent, mortgage, phone, insurance, medical bills, etc.)? Mortgage $1451  6. Do you have social security cards and/or green cards? [place] Citizens       Health Insurance:    Major Programs  This subscriber has eligibility for MA: Medical Assistance.  Elig Type AA: Parent of a dependent child  Eligibility Begin Date:  04/01/2020  Eligibility End Date: --/--/----  This subscriber is eligible for the following service types: Medical Care ,  Chiropractic ,  Dental Care ,  Hospital ,  Hospital - Inpatient ,  Hospital - Outpatient ,  Emergency Services ,  Pharmacy ,  Professional (Physician) Visit - Office ,  Vision (Optometry) ,  Mental Health ,  Urgent Care  Prepaid Health Plan  This subscriber receives MA12 - Prepaid Medical Assistance Program (PMAP) delivered through [employer]. The phone numbers is [phone] ().    Referral/Screening:    Is patient requesting help applying for [facility] benefits? Yes   Have you recently applied for any [facility] benefits? No   How many people in your household? 3   Do you buy/eat food together? Yes   What is the monthly gross income for the household (wages, social security, workers comp, and pension)?  --  Currently no income, has been using her savings   [place]? No  Pt currently has [employer] [place]   Is this an insurance renewal? No   Is this a new insurance application request? No   Is this a jordan care application? No   Any other information for the FRW? Pt having diff-  iculty getting  LTD through her  employer, has-  n't received a  pay check since  the first week  in April.       Financial Resource Worker Follow Up    Goals:   Goals Addressed as of 7/6/2021 at 9:08 AM                 Today      Financial Wellbeing (pt-stated)   20%    Added 6/28/21 by Polly Huerta, RN     Goal Statement: I will apply for [facility] Benefits within the next 1-2 weeks if I am eligible.   Date Goal Set:  6/28/2021   Barriers:  Having difficulty obtaining LTD through employer, recent abdominal surgery/hospitalization  Strengths:  Motivated, engaged in care coordination  Date to Achieve By:  7/12/2021  Patient expressed understanding of goal:  Yes  Action steps to achieve this goal:  1.  I understand a referral was placed to the Financial Resource Worker, I will  receive a call within the next 3 business days.    2.  I understand the financial worker will make two attempts to call me. If I still need help with this goal, I will connect with my Community Health Worker Yesika at [phone].            Intervention and Education during outreach: n/a    FRW Plan: FRW will make outreach in 2 weeks

## 2021-07-22 ENCOUNTER — MEDICAL CORRESPONDENCE (OUTPATIENT)
Dept: HEALTH INFORMATION MANAGEMENT | Facility: CLINIC | Age: 46
End: 2021-07-22

## 2021-07-22 ENCOUNTER — VIRTUAL VISIT (OUTPATIENT)
Dept: SURGERY | Facility: CLINIC | Age: 46
End: 2021-07-22
Payer: COMMERCIAL

## 2021-07-22 VITALS — WEIGHT: 245 LBS | BODY MASS INDEX: 43.41 KG/M2 | HEIGHT: 63 IN

## 2021-07-22 DIAGNOSIS — K51.919 ULCERATIVE COLITIS WITH COMPLICATION, UNSPECIFIED LOCATION (H): Primary | ICD-10-CM

## 2021-07-22 PROCEDURE — 99024 POSTOP FOLLOW-UP VISIT: CPT | Performed by: SURGERY

## 2021-07-22 ASSESSMENT — MIFFLIN-ST. JEOR: SCORE: 1725.44

## 2021-07-22 ASSESSMENT — PAIN SCALES - GENERAL: PAINLEVEL: NO PAIN (0)

## 2021-07-22 NOTE — NURSING NOTE
"Chief Complaint   Patient presents with     Surgical Followup     Post-op follow up, DOS:06/15/2021       Vitals:    07/22/21 0907   Weight: 245 lb   Height: 5' 3\"       Body mass index is 43.4 kg/m .         Lou Pineda CMA    "

## 2021-07-22 NOTE — NURSING NOTE
"Chief Complaint   Patient presents with     Hospital F/U       Vitals:    05/12/21 0815   Weight: 247 lb   Height: 5' 3\"       Body mass index is 43.75 kg/m .      Blayne Turner, EMT  Surgery Clinic                      "
 used

## 2021-07-22 NOTE — PROGRESS NOTES
Colon and Rectal Surgery Clinic Note    RE: Doretha Fernandez.  : 1975.  JUNIOR: 2021.    Reason for visit: f/u TAC + end ileostomy    Doretha is a 46 year old who is being evaluated via a billable video visit.      How would you like to obtain your AVS? MyChart  If the video visit is dropped, the invitation should be resent by: Send to e-mail at: [email or web address]  Will anyone else be joining your video visit? No      Video Start Time: 929  Video-Visit Details    Type of service:  Video Visit    Video End Time:9:49 AM    Originating Location (pt. Location): Home    Distant Location (provider location):  [facility] [place]     Platform used for Video Visit: [employer]    HPI:     Doretha Fernandez is a 47 y/o lady with UC not responding to medical management who underwent total abdominal colectomy with end ileostomy on 6/15/2021.    Since then she has been doing better.  The past month has certainly been a process, both healing from surgery and getting used to the ostomy, but overall she has progressed and thinks the past week has been the best week she has had since surgery.  She still gets pretty tired and has joint pain.  She has a physician who is helping her with her pain meds for the arthritis.    She is at 5 mg every other day of prednisone.  She is going to go to 2.5 mg prednisone every other day next week.  At this point this is being titrated by her arthritis doctor that she sees.    Her last narcotic pain medication was weeks ago.  She is only using tylenol and gabapentin.    She has noted mucus per rectum almost daily.    Ileostomy output ranges around 900-1000 cc per 24 hour period.  She has required no post op admissions.    She doesn't know if she's lost or gained weight since surgery.  She is meeting with medical weight loss specialist.  She is eating OK, she doesn't have much of an appetite.    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      Anxiety      Basal cell carcinoma      Cervical high risk HPV (human papillomavirus) test positive 2019    See problem list     Colitis      Depression      Diabetes mellitus, iatrogenic (H) 2020     Inflammatory arthritis      Insomnia      Intestinal giardiasis 3/5/2018     Lumbago     left lower back pain     Lymphedema      Malignant melanoma (H)      Melanoma (H) 10/23/2017     Mild persistent asthma      Obesity      STORMY (obstructive sleep apnea)      Prothrombin deficiency (H)     takes 81mg asa daily     Stroke (cerebrum) (H)     During      TIA (transient ischemic attack)      Type 2 diabetes mellitus (H)        Surgical history:  Past Surgical History:   Procedure Laterality Date     APPENDECTOMY       COLONOSCOPY N/A 10/18/2017    Procedure: COLONOSCOPY;  Colon;  Surgeon: Debbie Stephens MD;  [facility]     COLONOSCOPY N/A 3/9/2018    Procedure: COMBINED COLONOSCOPY, SINGLE OR MULTIPLE BIOPSY/POLYPECTOMY BY BIOPSY;  colon;  Surgeon: Benita Schumacher MD;  Location: [facility] GI     COLONOSCOPY      multiple since  to present - about 6 total     DISSECT LYMPH NODE AXILLA Left 10/23/2017    Procedure: DISSECT LYMPH NODE AXILLA;  Left Axillary Lymph Node Dissection ;  Surgeon: Laurent Cool MD;  Location: [facility] OR     EXAM UNDER ANESTHESIA PELVIC N/A 2020    Procedure: EXAM UNDER ANESTHESIA, PELVIS; with Cervical Biopsies, Vaginal Biopsy and Endocervical Curettings;  Surgeon: Melina Jung MD;  Location: [facility] OR     GYN SURGERY  ,          LAPAROSCOPIC ASSISTED COLECTOMY N/A 6/15/2021    Procedure: laparoscopic total abdominal colectomy, end ileostomy;  Surgeon: Quinton Ram MD;  Location: [facility] OR     REPAIR MOHS Left 2017    Procedure: REPAIR MOHS;  Left Upper Lid Moh's Reconstruction;  Surgeon: Kisha Bosch MD;  Location: [facility] OR       Family  history:  Family History   Problem Relation Age of Onset     Cancer Mother 45        lung     Neurologic Disorder Mother         epilepsy     Lipids Father      Gastrointestinal Disease Father         diverticulitis      Depression Father      Colitis Father      Colon Cancer Father      Diverticulitis Father      Cancer Maternal Grandmother      Blood Disease Maternal Grandmother         lymphoma      Arthritis Maternal Grandmother      Diabetes Maternal Grandmother      Depression Maternal Grandmother      Macular Degeneration Maternal Grandmother      Glaucoma Maternal Grandmother      Diabetes Maternal Grandfather      Cerebrovascular Disease Maternal Grandfather      Blood Disease Maternal Grandfather      Heart Disease Maternal Grandfather      Glaucoma Maternal Grandfather      Cancer Paternal Grandmother      Cancer - colorectal Paternal Grandmother      Colitis Paternal Grandmother      Colon Cancer Paternal Grandmother      Diverticulitis Paternal Grandmother      Respiratory Paternal Grandfather         emphysema      Colitis Paternal Grandfather      Colon Cancer Paternal Grandfather      Diverticulitis Paternal Grandfather      Heart Disease Daughter      Asthma Daughter      Depression Sister      Melanoma No family hx of      Medications:  Current Outpatient Medications   Medication Sig Dispense Refill     acetaminophen (TYLENOL) 325 MG tablet Take 3 tablets (975 mg) by mouth every 8 hours As scheduled for the next 7-10 days, then decrease both dose & frequency to as needed there after. 90 tablet 0     Alcohol Swabs (ALCOHOL PREP) 70 % PADS 1 applicator 3 times daily 100 each 0     amLODIPine (NORVASC) 5 MG tablet Take 1 tablet (5 mg) by mouth daily 90 tablet 3     blood glucose (NO BRAND SPECIFIED) lancets standard Use to test blood sugar 3 times daily or as directed. 1 each 0     blood glucose (NO BRAND SPECIFIED) test strip Use to test blood sugar 3 times daily or as directed. 200 strip 0      Calcium Carb-Cholecalciferol 600-800 MG-UNIT TABS Take 800 mg by mouth every morning (before breakfast) 90 tablet 3     calcium carbonate (TUMS) 500 MG chewable tablet Take 1 tablet (500 mg) by mouth 3 times daily as needed for heartburn 30 tablet 0     gabapentin (NEURONTIN) 600 MG tablet Take 1 tablet (600 mg) by mouth 4 times daily 120 tablet 0     hydrOXYzine (ATARAX) 25 MG tablet Take 1 tablet (25 mg) by mouth every 6 hours as needed for anxiety 40 tablet 0     insulin glargine (LANTUS PEN) 100 UNIT/ML pen Inject 4 Units Subcutaneous At Bedtime       insulin lispro (HUMALOG KWIKPEN) 100 UNIT/ML (1 unit dial) KWIKPEN Inject 1-7 Units Subcutaneous 4 times daily (with meals and nightly) Per the insulin sliding scale provided in the discharge summary. 3 mL 2     metFORMIN (GLUCOPHAGE-XR) 500 MG 24 hr tablet Take 2 tablets (1,000 mg) by mouth daily Daily with breakfast. 90 tablet 1     pantoprazole (PROTONIX) 40 MG EC tablet Take 1 tablet (40 mg) by mouth every morning (before breakfast) 90 tablet 3     predniSONE (DELTASONE) 20 MG tablet Take 1 tablet (20 mg) by mouth daily Prednisone 20mg daily x5 days, then decrease to prednisone 10mg daily x5 days, then decrease to prednisone 5mg daily x5 days, then decrease to prednisone 5mg every other day for 5 days, then off.       rosuvastatin (CRESTOR) 10 MG tablet Take 1 tablet (10 mg) by mouth daily 90 tablet 3     simethicone (MYLICON) 80 MG chewable tablet Take 1-2 tablets ( mg) by mouth 4 times daily as needed for cramping 120 tablet 0     sulfamethoxazole-trimethoprim (BACTRIM) 400-80 MG tablet Take 1 tablet by mouth daily 120 tablet 0     venlafaxine (EFFEXOR) 75 MG tablet Take 1 tablet (75 mg) by mouth 3 times daily 90 tablet 5     VITAMIN D3 25 MCG (1000 UT) tablet TAKE THREE TABLETS BY MOUTH ONCE DAILY 300 tablet 1     VITRON-C  MG TABS tablet Take 1 tablet by mouth daily 30 tablet 1       Allergies:  Allergies   Allergen Reactions     Bee Venom  Swelling     Azithromycin Diarrhea     Erythromycin      Other reaction(s): GI intolerance, Vomiting     Fentanyl Other (See Comments)     sweating  sweating     Prochlorperazine Fatigue     Other reaction(s): Other (see comments)  Fatigue     Buspirone      Other reaction(s): GI intolerance  vomiting     Erythrocin Nausea and Vomiting     Zithromax [Azithromycin Dihydrate] Diarrhea     Enbrel [Etanercept] Hives and Rash       Social history:   Social History     Tobacco Use     Smoking status: Former Smoker     Packs/day: 1.00     Years: 5.00     Pack years: 5.00     Quit date: 3/20/1998     Years since quittin.3     Smokeless tobacco: Never Used   Substance Use Topics     Alcohol use: Yes     Comment: occ     Marital status: .    ROS:  A complete review of systems was performed with the patient and all systems negative except as per HPI.    Physical Examination:  Overall she looks well.  She is awake, alert, oriented, and pleasant in conversation.  She is seated at her computer, breathing normal rate and rhythm.  Doesn't look out of breath.    Procedures:  None.    Laboratory values reviewed:  Recent Labs   Lab Test 21  0854 21  0805 06/15/21  0926 21  0626 21  0750 21  1356   WBC  --  11.3*   < > 16.1* 9.0 28.3*   HGB  --  11.5*  --  12.7 12.3 14.0    222   < > 254 225 308   CR  --  0.65   < > 0.70 0.72 0.66   ALBUMIN  --   --   --  3.1* 3.2* 3.4   BILITOTAL  --   --   --   --  0.3 0.2   ALKPHOS  --   --   --   --  54 52   ALT  --   --   --   --  42 91*   AST  --   --   --   --  12 21   INR  --   --   --   --   --  0.96    < > = values in this interval not displayed.       Imaging personally reviewed by me:  None today.    ASSESSMENT  47 y/o lady with UC, one month s/p TAC and end ileostomy.    Risks, benefits, and alternatives of operative treatment were thoroughly discussed with the patient, he/she understands these well and agrees to proceed.    PLAN  1. RTC in  2 months (end of September)  2. Continue to work with her home physician on titrating steroids to zero  3. Very happy she is meeting with medical weight loss specialist  4. Very excited to see her progress over the next several months and then reassess her candidacy for ileal pouch    45 minutes spent on the date of the encounter doing chart review, history and exam, imaging review, documentation and further activities as noted above.      Quinton Ram MD, PhD    Division of Colon and Rectal Surgery  [facility]    Referring Provider:  No referring provider defined for this encounter.     Primary Care Provider:  Anna Naidu

## 2021-07-26 ENCOUNTER — MYC MEDICAL ADVICE (OUTPATIENT)
Dept: FAMILY MEDICINE | Facility: CLINIC | Age: 46
End: 2021-07-26

## 2021-07-26 NOTE — TELEPHONE ENCOUNTER
Forms printed and in forms bin    **When finished: Please make a copy for station  and a copy to be sent to scanning. Send originals with patient.     Date received: July 26, 2021   Doctor: Anna Naidu MD  Daytime phone number: [phone]  : Patient     n: Mail back to patient  y: Mail or fax to destination requesting form  n: Patient to     Other notes:

## 2021-07-26 NOTE — TELEPHONE ENCOUNTER
Forms held in Form's Bin - Appt needed for completion - Can be either in person or virtual, per MD

## 2021-07-28 ENCOUNTER — VIRTUAL VISIT (OUTPATIENT)
Dept: FAMILY MEDICINE | Facility: CLINIC | Age: 46
End: 2021-07-28
Payer: COMMERCIAL

## 2021-07-28 ENCOUNTER — VIRTUAL VISIT (OUTPATIENT)
Dept: SURGERY | Facility: CLINIC | Age: 46
End: 2021-07-28
Payer: COMMERCIAL

## 2021-07-28 ENCOUNTER — TELEPHONE (OUTPATIENT)
Dept: SURGERY | Facility: CLINIC | Age: 46
End: 2021-07-28

## 2021-07-28 VITALS — HEIGHT: 63 IN | WEIGHT: 242 LBS | BODY MASS INDEX: 42.88 KG/M2

## 2021-07-28 DIAGNOSIS — K51.919 ULCERATIVE COLITIS WITH COMPLICATION, UNSPECIFIED LOCATION (H): Primary | ICD-10-CM

## 2021-07-28 DIAGNOSIS — Z93.3 COLOSTOMY IN PLACE (H): ICD-10-CM

## 2021-07-28 DIAGNOSIS — E13.9 DIABETES MELLITUS, IATROGENIC (H): ICD-10-CM

## 2021-07-28 DIAGNOSIS — F32.1 MODERATE MAJOR DEPRESSION (H): ICD-10-CM

## 2021-07-28 DIAGNOSIS — M06.9 RHEUMATOID ARTHRITIS, INVOLVING UNSPECIFIED SITE, UNSPECIFIED WHETHER RHEUMATOID FACTOR PRESENT (H): ICD-10-CM

## 2021-07-28 DIAGNOSIS — G43.109 MIGRAINE WITH AURA AND WITHOUT STATUS MIGRAINOSUS, NOT INTRACTABLE: ICD-10-CM

## 2021-07-28 DIAGNOSIS — E66.01 MORBID OBESITY (H): Primary | ICD-10-CM

## 2021-07-28 PROBLEM — F43.29 ADJUSTMENT DISORDER WITH MIXED EMOTIONAL FEATURES: Status: ACTIVE | Noted: 2020-06-16

## 2021-07-28 PROBLEM — M25.50 POLYARTHRALGIA: Status: ACTIVE | Noted: 2020-04-29

## 2021-07-28 PROBLEM — M25.562 ARTHRALGIA OF BOTH KNEES: Status: ACTIVE | Noted: 2018-05-12

## 2021-07-28 PROBLEM — M25.561 ARTHRALGIA OF BOTH KNEES: Status: ACTIVE | Noted: 2018-05-12

## 2021-07-28 PROBLEM — G62.0 DRUG-INDUCED POLYNEUROPATHY (H): Status: ACTIVE | Noted: 2020-04-29

## 2021-07-28 PROCEDURE — 99215 OFFICE O/P EST HI 40 MIN: CPT | Mod: 24 | Performed by: FAMILY MEDICINE

## 2021-07-28 PROCEDURE — 99212 OFFICE O/P EST SF 10 MIN: CPT | Mod: 95 | Performed by: FAMILY MEDICINE

## 2021-07-28 RX ORDER — PHENTERMINE HYDROCHLORIDE 37.5 MG/1
18.75-37.5 TABLET ORAL
Qty: 90 TABLET | Refills: 0 | Status: SHIPPED | OUTPATIENT
Start: 2021-07-28 | End: 2021-10-26

## 2021-07-28 RX ORDER — TRIAMCINOLONE ACETONIDE 0.1 %
1 PASTE (GRAM) DENTAL
COMMUNITY
Start: 2021-04-22 | End: 2022-05-18

## 2021-07-28 ASSESSMENT — MIFFLIN-ST. JEOR: SCORE: 1711.7

## 2021-07-28 NOTE — PATIENT INSTRUCTIONS
Our Clinic hours are:  Mondays    7:20 am - 7 pm  Tues -  Fri  7:20 am - 5 pm    Clinic Phone: [phone]    The clinic lab opens at 7:30 am Mon - Fri and appointments are required.    [facility]. [phone]  Monday  8 am - 7pm  Tues - Fri 8 am - 5:30 pm
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
## 2021-07-28 NOTE — TELEPHONE ENCOUNTER
Patient and doctor visit went long. Attempted to call patient for initial video nutrition visit, patient did not answer and unable to leave a voicemail due to full inbox. Sent patient a [email or web address] message about rescheduling initial appointment.    Yolette Valdivia RD, LD

## 2021-07-28 NOTE — PROGRESS NOTES
"Doretha is a 46 year old who is being evaluated via a billable video visit.      How would you like to obtain your AVS? MyChart  If the video visit is dropped, the invitation should be resent by: Send to e-mail at: [email or web address]  Will anyone else be joining your video visit? No    Video Start Time: 1020    Assessment & Plan     Ulcerative colitis with complication, unspecified location (H)       Rheumatoid arthritis, involving unspecified site, unspecified whether rheumatoid factor present (H)       Moderate major depression (H)       Colostomy in place (H)     Disability and LTD paperwork completed - will be scanned and forms left at  for patient to .    In short, she has been out of work since Mid April when she was hospitalized for ulcerative colitis and ultimately had a colectomy and ostomy placed.    She has ongoing rheumatoid arthritis and polyarthritis due that began after an immunologic drug was given for her metastatic melanoma diagnosis at the end of 2017 when she developed colitis and then subsequent arthritis.      She has not had a resolution of pain since that time and the pain makes work difficult and prolonged standing/sitting exacerbates her pain.    Her depression and anxiety make prolonged mental efforts difficult and increases risk of making mistakes in her work, etc.              BMI:   Estimated body mass index is 43.4 kg/m  as calculated from the following:    Height as of 7/22/21: 1.6 m (5' 3\").    Weight as of 7/22/21: 111.1 kg (245 lb).   Weight management plan: Discussed healthy diet and exercise guidelines        No follow-ups on file.    Anna Naidu MD  [facility]   Doretha is a 46 year old who presents for the following health issues      HPI     Chief Complaint   Patient presents with     Forms     Patient is asking to have disability forms completed.           Review of Systems         Objective           Vitals:  No " vitals were obtained today due to virtual visit.    Physical Exam   GENERAL: Healthy, alert and no distress  EYES: Eyes grossly normal to inspection.  No discharge or erythema, or obvious scleral/conjunctival abnormalities.  RESP: No audible wheeze, cough, or visible cyanosis.  No visible retractions or increased work of breathing.    SKIN: Visible skin clear. No significant rash, abnormal pigmentation or lesions.  NEURO: Cranial nerves grossly intact.  Mentation and speech appropriate for age.  PSYCH: mentation appears normal, affect flat and judgement and insight intact                Video-Visit Details    Type of service:  Video Visit    Video End Time:1027    Originating Location (pt. Location): Home    Distant Location (provider location):  [facility]     Platform used for Video Visit: [employer]

## 2021-08-03 ENCOUNTER — PATIENT OUTREACH (OUTPATIENT)
Dept: CARE COORDINATION | Facility: CLINIC | Age: 46
End: 2021-08-03

## 2021-08-03 DIAGNOSIS — E13.9 DIABETES MELLITUS, IATROGENIC (H): Primary | ICD-10-CM

## 2021-08-03 RX ORDER — LIRAGLUTIDE 6 MG/ML
1.8 INJECTION SUBCUTANEOUS DAILY
Qty: 27 ML | Refills: 3 | Status: SHIPPED | OUTPATIENT
Start: 2021-08-03 | End: 2021-09-01

## 2021-08-03 NOTE — PROGRESS NOTES
Clinic Care Coordination Contact  Program: [facility]  County: [facility] Case #:  [facility] Worker:   Irvingyamini #:   Subscriber #:   Renewal:  Date Applied:     FRW Outreach:  8/3/2021: FRW called patient and left a final vm with call back information as it appeared patient dismissed the call. FRW left a vm advising her to please call or let her CHW know if she would still like FRW assistance. FRW will remove patient from panel and resolve the FRW episode. Please refer to FRW for future needs.  2021: FRW called patient and she states she was sent a paper combined application by the [facility]. She already filled it out and would like to try sending it in on her own but may need help. Patient requested FRW check in, in a couple of weeks to make sure she doesn't need assistance. FRW will make outreach in 2 weeks.   2021: FRW called patient for our scheduled phone appointment and left a vm with call back information. FRW will make outreach in 1-2 weeks.   2021: FRW called patient for our scheduled phone appointment and patient would like to reschedule. We rescheduled to 21 at 10:00 am. FRW will make outreach at that time.   2021: FRW called patient and we went through the combined application screening. They appear to be within the income guidelines o we scheduled a phone visit to apply on 21 at 10:00 am. FRW will make outreach at that time.   2021: Outreach attempted x 1. Left message on voicemail with call back information and requested return call.  Plan: FRW will call again within one week.     SNAP/CASH Application Screenin. Have you had [facility] benefits before? Years ago  2. How many people in the household, do you eat/buy food together? 3, herself, 2 daughters   3. What is your monthly income (include all tax members)? None hasn't worked since 21. Daughter, $13, 30 hours $1,690  4. Do you have a bank account? Checking and Savings,   5. Do you have any utility bills  (electricity, rent, mortgage, phone, insurance, medical bills, etc.)? Mortgage $1451  6. Do you have social security cards and/or green cards? [place] Citizens       Health Insurance:    Major Programs  This subscriber has eligibility for MA: Medical Assistance.  Elig Type AA: Parent of a dependent child  Eligibility Begin Date: 04/01/2020  Eligibility End Date: --/--/----  This subscriber is eligible for the following service types: Medical Care ,  Chiropractic ,  Dental Care ,  Hospital ,  Hospital - Inpatient ,  Hospital - Outpatient ,  Emergency Services ,  Pharmacy ,  Professional (Physician) Visit - Office ,  Vision (Optometry) ,  Mental Health ,  Urgent Care  Prepaid Health Plan  This subscriber receives MA12 - Prepaid Medical Assistance Program (PMAP) delivered through [employer]. The phone numbers is [phone] ().    Referral/Screening:    Is patient requesting help applying for Community Health benefits? Yes   Have you recently applied for any county benefits? No   How many people in your household? 3   Do you buy/eat food together? Yes   What is the monthly gross income for the household (wages, social security, workers comp, and pension)?  --  Currently no income, has been using [place] savings   [place]? No  Pt currently has [employer] [place]   Is this an insurance renewal? No   Is this a new insurance application request? No   Is this a jordan care application? No   Any other information for the FRW? Pt having diff-  iculty getting  LTD through her  employer, has-  n't received a  pay check since  the first week  in April.       Financial Resource Worker Follow Up    Goals:       Intervention and Education during outreach: n/a    FRW Plan: n/a

## 2021-08-04 ENCOUNTER — PATIENT OUTREACH (OUTPATIENT)
Dept: CARE COORDINATION | Facility: CLINIC | Age: 46
End: 2021-08-04

## 2021-08-04 DIAGNOSIS — Z90.49 S/P COLECTOMY: ICD-10-CM

## 2021-08-04 DIAGNOSIS — K51.90 ULCERATIVE COLITIS WITHOUT COMPLICATIONS, UNSPECIFIED LOCATION (H): Primary | ICD-10-CM

## 2021-08-04 ASSESSMENT — ACTIVITIES OF DAILY LIVING (ADL): DEPENDENT_IADLS:: CLEANING;TRANSPORTATION

## 2021-08-04 NOTE — PROGRESS NOTES
Clinic Care Coordination Contact  [facility]/Voicemail    Referral Source: IP Handoff  6/15/2021 referral   Reason for Referral: Care Transition: Home care discharge      Additional pertinent details: 47 y/o F, PMH of melanoma unknown primary (2017), s/p left axillary LN dissection (2017, Dr. Cool) in remission, history of adjuvant nivolumab induced colitis in 2018 treated with remicade (x1 at [place]) and Entyvio, h/o c.diff (recent bout, started PO vanc 4/7), seronegative RA on prednisone & tocilizumab, steroid induced DM, history of cushings, chronic pain, relatively new diagnosis of UC, received Entyvio x2 (most recent infusions on 5/12, 5/26), mostly steroid dependent x3 years.  Pt has had recent readmissions for hematochezia, acute on chronic abdominal & rectal pain, nausea for refractory UC and concerns regarding risks associated with melanoma recurrence with using standard UC maintenance medications.  Pt underwent total abdominal colectomy and end ileostomy on 6/15/2021. Referral made to [facility] for RN only.     Hospital admission 6/15-6/19/2021       Discharge Diagnosis:   Ulcerative colitis without complications, unspecified location (H) [K51.90]  Refractory ulcerative colitis     History of melanoma unknown primary 2017  History of nivolumab induced colitis  History of c.diff  Seronegative RA  Chronic steroid use  Steroid induced hyperglycemia  History of cushings  Chronic pain          Procedures:   6/15/2021: PROCEDURES PERFORMED:   1.  Laparoscopic total abdominal colectomy with end ileostomy.    Clinical Data: Care Coordinator Outreach  Outreach attempted x 1.  Left message on patient's voicemail with call back information and requested return call.  Plan:  Care Coordinator will try to reach patient again in 10 business days.  [facility]   Gisel Marte RN, Care Coordinator   [facility]'s   E-mail [email or web address]   [phone]

## 2021-08-06 ENCOUNTER — TELEPHONE (OUTPATIENT)
Dept: SURGERY | Facility: CLINIC | Age: 46
End: 2021-08-06

## 2021-08-09 ENCOUNTER — VIRTUAL VISIT (OUTPATIENT)
Dept: SURGERY | Facility: CLINIC | Age: 46
End: 2021-08-09
Payer: COMMERCIAL

## 2021-08-09 DIAGNOSIS — E66.01 MORBID OBESITY WITH BMI OF 40.0-44.9, ADULT (H): Primary | ICD-10-CM

## 2021-08-09 DIAGNOSIS — E66.9 OBESITY: Primary | ICD-10-CM

## 2021-08-09 PROCEDURE — 97802 MEDICAL NUTRITION INDIV IN: CPT | Mod: 95 | Performed by: DIETITIAN, REGISTERED

## 2021-08-09 NOTE — PROGRESS NOTES
Reason for Visit: 24-Week Healthy Lifestyle Plan Initial Visit - Health Coaching    Progress Notes:  New 24-Week Healthy Lifestyle Plan Weight Management Health Coaching Note  Doretha Fernandez   MRN: [id number]  : 1975  JUNIOR: 2021    Initial Health  Visit #1 ~ Phone Visit    ASSESSMENT:  Initial Start Date:  2021  Graduation Date:  2022  [employer] (online resource) enrollment date(s):  2021  Physician:  Dr. Lindsey  Initial Weight (lbs): 242 lbs.  Current Weight (lbs): 244 lbs. (goal of under 200 lbs so she can have next surgery - J-pouch)  Average Daily Water (ounces): did not report today (oz/day)  Weight Loss Medication: phentermine  Nutrition Plan: real whole foods    PATIENT INFORMATION PROVIDED via email sent by Health :  1.) 24 Week Healthy Lifestyle Plan Overview:  Explained the structure of the 24-week plan, reviewed scheduling information including the main scheduling phone number [phone], discussed all coaching sessions will be done via phone.  2.) [employer] - Online resource available to patient on the 1st day of the month following start date with Health .  Patient will receive a Welcome email from [employer] with their user name and password.  Patient will have full access to [employer] for a duration of 7 months.  This online resource will be continued if patient purchases the 24-week Extension which includes 3, 30-min. Health and Wellness Coaching appointments.  3.) Support Group monthly topics, dates/times - Flyer with more information provided by the Health  (see end of note for the current list)  4.) Explain the role of a Health  & the FOUR Pillars of Health (Nutrition, Exercise/Activity/Movement, Sleep and Stress Management)    INITIAL INTAKE: (will complete at visit #2)  The four pillars of well-being may impact your ability to manage weight.   Level of Satisfaction:  Rate your current level of satisfaction on a scale of 1-10 in  each pillar.     Nutrition (1 -10):     Movement/Activity (1 -10):     Sleep (1 -10):     Stress Management (1 -10):     Questions: Use all or some of the following questions to get to know what the patient is wanting for herself/himself. Ask,  Is this a question that would be important to talk about for you?   1.) Which of the pillars are you wanting to focus on first and why?   2.) What are some things you have learned along the way about yourself and weight loss?   3.) What is really solid about you or what can you really count on about yourself?   4.) Is there anything else that you would like me to know?     WELLNESS VISION: 5 minute Visioning Exercise (may be completed at visit #2/#3)    You may choose to close your eyes for this exercise. Start with three full breaths to bring your attention inward.    In your mind s eye, see yourself in the near future. You are your healthiest, happiest, and most true version of yourself. What do you see? What do you notice?     Invite patient to expand on this vision, and/or start  trying on  this vision this week.      NOTES:    Has a daughter  Emotionally drained and mentally exhausted  Lots of health issues  predisone for 3 years, 60 lbs.  Diabetes  High cholesterol  Cancer survivor - metastatic melanoma  Collectomy in June (ostomy bag) J-pouch  Mentally exhausted  Would love day by day instructions of what to eat, etc.      Goals:  3 meals/day & Protein first  Start incorporating fruits and veggies into diet (starting to re-introduce if ok)  Review the list from Yolette with foods that are ok to eat (with all her health concerns)  *asked Yolette (sent message) for more information and sample meals for several days.  Doretha would like a plan set up for her. Also inquired if Bariatrix food products would be a good choice for her, easy to follow plan if ok with her health history. Yolette confirmed that Bariatrix products are ok.  Sent Doretha the online link to order if she is  interested:    [email or web address]      FOLLOW-Up: 8/16 with Joanna for HC #2, 9/1 with Dr. Lindsey, 9/20 with SUAD De La Vega, f/u      Monthly Support Groups offered virtually via TEAMS.  Contact Dianna Zamudio [email or web address]) to be added to the invite list.  Friday, August 27, 12:30-1:30 pm:  Open Forum  Friday, September 24, 12:30-1:30 pm:  Sleep and the Seven Types of Rest  Friday, October 29, 12:30-1:30 pm:  Open Forum  Friday, November 19, 12:30-1:30 pm:  Gratitude  Friday, December 10, 12:30-1:30 pm:  Open Forum       SIGNATURE:  YUMIKO Nelson, [facility]  National Board Certified Health &   24 Week Healthy Lifestyle Program Health   [facility]  [email or web address]

## 2021-08-09 NOTE — LETTER
2021         RE: Doretha Fernandez  [address]  [place] [address]        Dear Colleague,    Thank you for referring your patient, Doretha Fernandez, to the [facility]ERY [facility] AND [facility]ATRICS CARE [place]. Please see a copy of my visit note below.    Reason for Visit: 24-Week Healthy Lifestyle Plan Initial Visit - Health Coaching    Progress Notes:  New 24-Week Healthy Lifestyle Plan Weight Management Health Coaching Note  Doretha Fernandez   MRN: [id number]  : 1975  JUNIOR: 2021    Initial Health  Visit #1 ~ Phone Visit    ASSESSMENT:  Initial Start Date:  2021  Graduation Date:  2022  [employer] (online resource) enrollment date(s):  2021  Physician:  Dr. Lindsey  Initial Weight (lbs): 242 lbs.  Current Weight (lbs): 244 lbs. (goal of under 200 lbs so she can have next surgery - J-pouch)  Average Daily Water (ounces): did not report today (oz/day)  Weight Loss Medication: phentermine  Nutrition Plan: real whole foods    PATIENT INFORMATION PROVIDED via email sent by Health :  1.) 24 Week Healthy Lifestyle Plan Overview:  Explained the structure of the 24-week plan, reviewed scheduling information including the main scheduling phone number [phone], discussed all coaching sessions will be done via phone.  2.) [employer] - Online resource available to patient on the 1st day of the month following start date with Health .  Patient will receive a Welcome email from [employer] with their user name and password.  Patient will have full access to [employer] for a duration of 7 months.  This online resource will be continued if patient purchases the 24-week Extension which includes 3, 30-min. Health and Wellness Coaching appointments.  3.) Support Group monthly topics, dates/times - Flyer with more information provided by the Health  (see end of note for the current list)  4.) Explain the role of a Health  & the FOUR Pillars of Health  (Nutrition, Exercise/Activity/Movement, Sleep and Stress Management)    INITIAL INTAKE: (will complete at visit #2)  The four pillars of well-being may impact your ability to manage weight.   Level of Satisfaction:  Rate your current level of satisfaction on a scale of 1-10 in each pillar.     Nutrition (1 -10):     Movement/Activity (1 -10):     Sleep (1 -10):     Stress Management (1 -10):     Questions: Use all or some of the following questions to get to know what the patient is wanting for herself/himself. Ask,  Is this a question that would be important to talk about for you?   1.) Which of the pillars are you wanting to focus on first and why?   2.) What are some things you have learned along the way about yourself and weight loss?   3.) What is really solid about you or what can you really count on about yourself?   4.) Is there anything else that you would like me to know?     WELLNESS VISION: 5 minute Visioning Exercise (may be completed at visit #2/#3)    You may choose to close your eyes for this exercise. Start with three full breaths to bring your attention inward.    In your mind s eye, see yourself in the near future. You are your healthiest, happiest, and most true version of yourself. What do you see? What do you notice?     Invite patient to expand on this vision, and/or start  trying on  this vision this week.      NOTES:    Has a daughter  Emotionally drained and mentally exhausted  Lots of health issues  predisone for 3 years, 60 lbs.  Diabetes  High cholesterol  Cancer survivor - metastatic melanoma  Collectomy in June (ostomy bag) J-pouch  Mentally exhausted  Would love day by day instructions of what to eat, etc.      Goals:  3 meals/day & Protein first  Start incorporating fruits and veggies into diet (starting to re-introduce if ok)  Review the list from Yolette with foods that are ok to eat (with all her health concerns)  *ask Yolette (sent message) for more information and sample meals for  several days.  Doretha would like a plan set up for her. Also inquired if Bariatrix food products would be a good choice for her, easy to follow plan if ok with her health history.      FOLLOW-Up: 8/16 with Joanna for HC #2, 9/1 with Dr. Lindsey, 9/20 with SUAD De La Vega, f/u      Monthly Support Groups offered virtually via TEAMS.  Contact Dianna Zamudio ([email or web address]) to be added to the invite list.  Friday, August 27, 12:30-1:30 pm:  Open Forum  Friday, September 24, 12:30-1:30 pm:  Sleep and the Seven Types of Rest  Friday, October 29, 12:30-1:30 pm:  Open Forum  Friday, November 19, 12:30-1:30 pm:  Gratitude  Friday, December 10, 12:30-1:30 pm:  Open Forum       SIGNATURE:  YUMIKO Nelson, [facility]  National Board Certified Health &   24 Week Healthy Lifestyle Program Health   [facility]  [email or web address]        Again, thank you for allowing me to participate in the care of your patient.        Sincerely,        Joanna Ojeda

## 2021-08-09 NOTE — PROGRESS NOTES
Doretha Fernandez is a 46 year old who is being evaluated via a billable video visit.      How would you like to obtain your AVS? MyChart  If the video visit is dropped, the invitation should be resent by: Send to e-mail at: [email or web address]  Will anyone else be joining your video visit? No      Video Start Time: 8:36 am      Medical Weight Loss Initial Diet Evaluation - 24 Week Program  Assessment:  Doretha is presenting today for a new weight management nutrition consultation. Pt has had an initial appointment with Dr. Lindsey.  Weight loss medication: Phentermine. Trulicity     Personal Goals: Under 200 lb to have step 2 surgery     Anthropometrics:    BMI: There is no height or weight on file to calculate BMI.   Ideal body weight: 52.4 kg (115 lb 7.7 oz)  Adjusted ideal body weight: 75.3 kg (166 lb 1.4 oz)  Estimated RMR (Upson-St Jeor equation):  1714 kcals x 1.2 (sedentary) = 2057 kcals (for weight maintenance)    Recommended Protein Intake: 85-95 grams of protein/day    Medical History:  Patient Active Problem List   Diagnosis     CARDIOVASCULAR SCREENING; LDL GOAL LESS THAN 160     DUB (dysfunctional uterine bleeding)     Anxiety state     Esophageal reflux     Moderate major depression (H)     Mild intermittent asthma without complication     Vision changes     Prothrombin mutation (H)     Metastatic malignant melanoma (H)     Malignant melanoma of left upper extremity including shoulder (H)     Functional diarrhea     Colitis     Rectal bleeding     Bilateral leg cramps     Rash and nonspecific skin eruption     Other chronic pain     Immunosuppressed status (H)     Ulcerative colitis with complication, unspecified location (H)     Morbid obesity (H)     Cervical high risk HPV (human papillomavirus) test positive     STORMY (obstructive sleep apnea)     Secondary lymphedema     Chronic neutrophilia     Diabetes mellitus, iatrogenic (H)     High risk HPV infection     Abdominal pain     Anemia     Candidiasis  of mouth     Hypocalcemia     Malaise     Menstrual irregularity     Arthritis, rheumatoid (H)     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 difficile colitis     Ulcerative colitis without complications, unspecified location (H)     Colostomy in place (H)     S/P colectomy     Polyarthralgia     Drug-induced polyneuropathy (H)     Arthralgia of both knees     Adjustment disorder with mixed emotional features     Migraines      Diabetes: yes, related to high dose Prednisone  HbA1c:  No results found for: HGBA1C    Nutrition History:   Food allergies/intolerances/cultural or religous food customs: No, but was doing low    Weight loss history: she has gained about 60 lbs related to high dose prednisone, recent colostomy    Dietary Recall:  Two meals is typical - the last couple weeks she has been watching more what she eats  Breakfast: Keys Omelet  Lunch: None  Dinner: chicken, asparagus, potato with sour cream  Typical Snacks: jairon crackers     Beverages:   Coffee: 2-3 cups coffee  Water: Trying to drink more water, 4 bottles yesterday - aims for 6 bottles    Exercise:   No routine; likes walking - has been walking every day, gets about 5,000 steps     Nutrition Diagnosis (PES statement):   Overweight/Obesity (NC 3.3) related to excessive calorie intake as evidenced by BMI 42    Nutrition Intervention  1. Food and/or Nutrient Delivery   a. Placed emphasis on importance of developing a healthy meal routine, aiming for 3 meals a day and no snacks.  2. Nutrition Education   a. Discussed with patient how to build a meal: the importance of including a lean/low fat protein at each meal, include a source of vegetables at a minimum of lunch and dinner  b. Educated on sources of lean protein, portion sizes, the  amount of grams found in each source. Recommend patient to aim for 20-30g protein at each meal.  c. Discussed the importance of adequate hydration, with emphasis on drinking 64oz of water or zero calorie beverages per day.  3. Nutrition Counseling   a. Encouraged importance of developing routine exercise for health benefits and weight loss.  b. Discussed mindful eating techniques such as eating off smaller places/bowls, taking 20-30 minutes to eat in a calm/relaxed environment without distractions.    Goals established by patient:   1. Eat protein first, plate method (incorporate 1 small portion of a new food to meals). Add only one new food at a time, avoid skins and peels.  2. Chew food well, take 20-30 minutes to eat meals  3. Continue walking routine    Handouts provided:  Nutrient Dense Foods List  Non surgical Nutrition Info and sample meals    Assessment/Plan:    Pt will follow up with bariatrician and 1 month(s) with dietitian.     Video-Visit Details    Type of service:  Video Visit    Video End Time:9:12 AM    Originating Location (pt. Location): Home    Distant Location (provider location):  [facility] SURGERY CLINIC AND BARIATRICS CARE [place]     Platform used for Video Visit: Gabriel Valdivia RD

## 2021-08-09 NOTE — LETTER
8/9/2021         RE: Doretha Fernandez  [address]  [place] [address]        Dear Colleague,    Thank you for referring your patient, Doretha Fernandez, to the [facility] SURGERY CLINIC AND BARIATRICS CARE [place]. Please see a copy of my visit note below.    Doretha Fernandez is a 46 year old who is being evaluated via a billable video visit.      How would you like to obtain your AVS? MyChart  If the video visit is dropped, the invitation should be resent by: Send to e-mail at: [email or web address]  Will anyone else be joining your video visit? No      Video Start Time: 8:36 am      Medical Weight Loss Initial Diet Evaluation - 24 Week Program  Assessment:  Doretha is presenting today for a new weight management nutrition consultation. Pt has had an initial appointment with Dr. Lindsey.  Weight loss medication: Phentermine. Trulicity     Personal Goals: Under 200 lb to have step 2 surgery     Anthropometrics:    BMI: There is no height or weight on file to calculate BMI.   Ideal body weight: 52.4 kg (115 lb 7.7 oz)  Adjusted ideal body weight: 75.3 kg (166 lb 1.4 oz)  Estimated RMR (Hidalgo-St Jeor equation):  1714 kcals x 1.2 (sedentary) = 2057 kcals (for weight maintenance)    Recommended Protein Intake: 85-95 grams of protein/day    Medical History:  Patient Active Problem List   Diagnosis     CARDIOVASCULAR SCREENING; LDL GOAL LESS THAN 160     DUB (dysfunctional uterine bleeding)     Anxiety state     Esophageal reflux     Moderate major depression (H)     Mild intermittent asthma without complication     Vision changes     Prothrombin mutation (H)     Metastatic malignant melanoma (H)     Malignant melanoma of left upper extremity including shoulder (H)     Functional diarrhea     Colitis     Rectal bleeding     Bilateral leg cramps     Rash and nonspecific skin eruption     Other chronic pain     Immunosuppressed status (H)     Ulcerative colitis with complication, unspecified location (H)      Morbid obesity (H)     Cervical high risk HPV (human papillomavirus) test positive     STORMY (obstructive sleep apnea)     Secondary lymphedema     Chronic neutrophilia     Diabetes mellitus, iatrogenic (H)     High risk HPV infection     Abdominal pain     Anemia     Candidiasis of mouth     Hypocalcemia     Malaise     Menstrual irregularity     Arthritis, rheumatoid (H)     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 difficile colitis     Ulcerative colitis without complications, unspecified location (H)     Colostomy in place (H)     S/P colectomy     Polyarthralgia     Drug-induced polyneuropathy (H)     Arthralgia of both knees     Adjustment disorder with mixed emotional features     Migraines      Diabetes: yes, related to high dose Prednisone  HbA1c:  No results found for: HGBA1C    Nutrition History:   Food allergies/intolerances/cultural or religous food customs: No, but was doing low    Weight loss history: she has gained about 60 lbs related to high dose prednisone, recent colostomy    Dietary Recall:  Two meals is typical - the last couple weeks she has been watching more what she eats  Breakfast: Keys Omelet  Lunch: None  Dinner: chicken, asparagus, potato with sour cream  Typical Snacks: jairon crackers     Beverages:   Coffee: 2-3 cups coffee  Water: Trying to drink more water, 4 bottles yesterday - aims for 6 bottles    Exercise:   No routine; likes walking - has been walking every day, gets about 5,000 steps     Nutrition Diagnosis (PES statement):   Overweight/Obesity (NC 3.3) related to excessive calorie intake as evidenced by BMI 42    Nutrition Intervention  1. Food and/or Nutrient Delivery   a. Placed emphasis on importance of developing a healthy meal routine, aiming for 3 meals a day and  no snacks.  2. Nutrition Education   a. Discussed with patient how to build a meal: the importance of including a lean/low fat protein at each meal, include a source of vegetables at a minimum of lunch and dinner  b. Educated on sources of lean protein, portion sizes, the amount of grams found in each source. Recommend patient to aim for 20-30g protein at each meal.  c. Discussed the importance of adequate hydration, with emphasis on drinking 64oz of water or zero calorie beverages per day.  3. Nutrition Counseling   a. Encouraged importance of developing routine exercise for health benefits and weight loss.  b. Discussed mindful eating techniques such as eating off smaller places/bowls, taking 20-30 minutes to eat in a calm/relaxed environment without distractions.    Goals established by patient:   1. Eat protein first, plate method (incorporate 1 small portion of a new food to meals). Add only one new food at a time, avoid skins and peels.  2. Chew food well, take 20-30 minutes to eat meals  3. Continue walking routine    Handouts provided:  Nutrient Dense Foods List  Non surgical Nutrition Info and sample meals    Assessment/Plan:    Pt will follow up with bariatrician and 1 month(s) with dietitian.     Video-Visit Details    Type of service:  Video Visit    Video End Time:9:12 AM    Originating Location (pt. Location): Home    Distant Location (provider location):  [facility] SURGERY CLINIC AND BAR[facility]ATRICS CARE [place]     Platform used for Video Visit: Gabriel Valdivia RD        Again, thank you for allowing me to participate in the care of your patient.        Sincerely,        Yolette Valdivia RD

## 2021-08-09 NOTE — TELEPHONE ENCOUNTER
Central Prior Authorization Team   Phone: [phone]    PA Initiation    Medication: phentermine (ADIPEX-P) 37.5 MG tablet   Insurance Company: [employer] [place] - Phone [phone] Fax [phone]  Pharmacy Filling the Rx: [place], [place] - [address]  Filling Pharmacy Phone: [phone]  Filling Pharmacy Fax: [phone]  Start Date: 8/9/2021

## 2021-08-15 ENCOUNTER — HOSPITAL ENCOUNTER (EMERGENCY)
Facility: CLINIC | Age: 46
Discharge: HOME | End: 2021-08-15
Attending: FAMILY MEDICINE | Admitting: FAMILY MEDICINE
Payer: COMMERCIAL

## 2021-08-15 VITALS
BODY MASS INDEX: 42.52 KG/M2 | RESPIRATION RATE: 16 BRPM | DIASTOLIC BLOOD PRESSURE: 83 MMHG | SYSTOLIC BLOOD PRESSURE: 117 MMHG | OXYGEN SATURATION: 97 % | WEIGHT: 240 LBS | TEMPERATURE: 97.7 F | HEART RATE: 89 BPM

## 2021-08-15 DIAGNOSIS — M05.9 RHEUMATOID ARTHRITIS WITH POSITIVE RHEUMATOID FACTOR, INVOLVING UNSPECIFIED SITE (H): ICD-10-CM

## 2021-08-15 DIAGNOSIS — K51.80 OTHER ULCERATIVE COLITIS WITHOUT COMPLICATION (H): ICD-10-CM

## 2021-08-15 DIAGNOSIS — R52 BODY ACHES: ICD-10-CM

## 2021-08-15 DIAGNOSIS — M62.81 GENERALIZED MUSCLE WEAKNESS: ICD-10-CM

## 2021-08-15 LAB
ALBUMIN SERPL-MCNC: 3.7 G/DL (ref 3.4–5)
ALBUMIN UR-MCNC: 30 MG/DL
ALP SERPL-CCNC: 73 U/L (ref 40–150)
ALT SERPL W P-5'-P-CCNC: 28 U/L (ref 0–50)
ANION GAP SERPL CALCULATED.3IONS-SCNC: 8 MMOL/L (ref 3–14)
APPEARANCE UR: ABNORMAL
AST SERPL W P-5'-P-CCNC: 17 U/L (ref 0–45)
BACTERIA #/AREA URNS HPF: ABNORMAL /HPF
BASOPHILS # BLD AUTO: 0.1 10E3/UL (ref 0–0.2)
BASOPHILS NFR BLD AUTO: 1 %
BILIRUB SERPL-MCNC: 0.2 MG/DL (ref 0.2–1.3)
BILIRUB UR QL STRIP: NEGATIVE
BUN SERPL-MCNC: 16 MG/DL (ref 7–30)
CALCIUM SERPL-MCNC: 9.3 MG/DL (ref 8.5–10.1)
CHLORIDE BLD-SCNC: 108 MMOL/L (ref 94–109)
CO2 SERPL-SCNC: 23 MMOL/L (ref 20–32)
COLOR UR AUTO: YELLOW
CREAT SERPL-MCNC: 0.66 MG/DL (ref 0.52–1.04)
EOSINOPHIL # BLD AUTO: 0.7 10E3/UL (ref 0–0.7)
EOSINOPHIL NFR BLD AUTO: 5 %
ERYTHROCYTE [DISTWIDTH] IN BLOOD BY AUTOMATED COUNT: 13 % (ref 10–15)
GFR SERPL CREATININE-BSD FRML MDRD: >90 ML/MIN/1.73M2
GLUCOSE BLD-MCNC: 116 MG/DL (ref 70–99)
GLUCOSE UR STRIP-MCNC: NEGATIVE MG/DL
HCT VFR BLD AUTO: 38.2 % (ref 35–47)
HGB BLD-MCNC: 12.6 G/DL (ref 11.7–15.7)
HGB UR QL STRIP: NEGATIVE
HYALINE CASTS: 3 /LPF
IMM GRANULOCYTES # BLD: 0.1 10E3/UL
IMM GRANULOCYTES NFR BLD: 1 %
KETONES UR STRIP-MCNC: NEGATIVE MG/DL
LACTATE SERPL-SCNC: 2.3 MMOL/L (ref 0.7–2)
LEUKOCYTE ESTERASE UR QL STRIP: NEGATIVE
LYMPHOCYTES # BLD AUTO: 2.7 10E3/UL (ref 0.8–5.3)
LYMPHOCYTES NFR BLD AUTO: 21 %
MAGNESIUM SERPL-MCNC: 1.7 MG/DL (ref 1.6–2.3)
MCH RBC QN AUTO: 30.1 PG (ref 26.5–33)
MCHC RBC AUTO-ENTMCNC: 33 G/DL (ref 31.5–36.5)
MCV RBC AUTO: 91 FL (ref 78–100)
MONOCYTES # BLD AUTO: 0.9 10E3/UL (ref 0–1.3)
MONOCYTES NFR BLD AUTO: 7 %
MUCOUS THREADS #/AREA URNS LPF: PRESENT /LPF
NEUTROPHILS # BLD AUTO: 8.3 10E3/UL (ref 1.6–8.3)
NEUTROPHILS NFR BLD AUTO: 65 %
NITRATE UR QL: NEGATIVE
NRBC # BLD AUTO: 0 10E3/UL
NRBC BLD AUTO-RTO: 0 /100
PH UR STRIP: 5 [PH] (ref 5–7)
PLATELET # BLD AUTO: 336 10E3/UL (ref 150–450)
POTASSIUM BLD-SCNC: 4.1 MMOL/L (ref 3.4–5.3)
PROT SERPL-MCNC: 7.5 G/DL (ref 6.8–8.8)
RBC # BLD AUTO: 4.18 10E6/UL (ref 3.8–5.2)
RBC URINE: 1 /HPF
SODIUM SERPL-SCNC: 139 MMOL/L (ref 133–144)
SP GR UR STRIP: 1.03 (ref 1–1.03)
SQUAMOUS EPITHELIAL: 4 /HPF
UROBILINOGEN UR STRIP-MCNC: NORMAL MG/DL
WBC # BLD AUTO: 12.9 10E3/UL (ref 4–11)
WBC URINE: 2 /HPF

## 2021-08-15 PROCEDURE — 85025 COMPLETE CBC W/AUTO DIFF WBC: CPT | Performed by: FAMILY MEDICINE

## 2021-08-15 PROCEDURE — 83605 ASSAY OF LACTIC ACID: CPT | Performed by: FAMILY MEDICINE

## 2021-08-15 PROCEDURE — 99284 EMERGENCY DEPT VISIT MOD MDM: CPT | Performed by: FAMILY MEDICINE

## 2021-08-15 PROCEDURE — 99283 EMERGENCY DEPT VISIT LOW MDM: CPT | Performed by: FAMILY MEDICINE

## 2021-08-15 PROCEDURE — 83735 ASSAY OF MAGNESIUM: CPT | Performed by: FAMILY MEDICINE

## 2021-08-15 PROCEDURE — 81001 URINALYSIS AUTO W/SCOPE: CPT | Performed by: FAMILY MEDICINE

## 2021-08-15 PROCEDURE — 36415 COLL VENOUS BLD VENIPUNCTURE: CPT | Performed by: FAMILY MEDICINE

## 2021-08-15 PROCEDURE — 80053 COMPREHEN METABOLIC PANEL: CPT | Performed by: FAMILY MEDICINE

## 2021-08-15 RX ORDER — HYDROCODONE BITARTRATE AND ACETAMINOPHEN 5; 325 MG/1; MG/1
1 TABLET ORAL EVERY 4 HOURS PRN
Qty: 15 TABLET | Refills: 0 | Status: SHIPPED | OUTPATIENT
Start: 2021-08-15 | End: 2021-09-01

## 2021-08-15 ASSESSMENT — ENCOUNTER SYMPTOMS
FEVER: 0
SPEECH DIFFICULTY: 0
MYALGIAS: 1
CHILLS: 0
EYE REDNESS: 0
COUGH: 0
LIGHT-HEADEDNESS: 0
NAUSEA: 1
WEAKNESS: 1
SORE THROAT: 0
PSYCHIATRIC NEGATIVE: 1
ABDOMINAL DISTENTION: 0
BRUISES/BLEEDS EASILY: 0
DYSURIA: 0
SHORTNESS OF BREATH: 0
VOMITING: 0

## 2021-08-15 NOTE — ED PROVIDER NOTES
History     Chief Complaint   Patient presents with     Generalized Weakness     fatigue, weakness and all over body aches. thirsty. Hx of colectomy 7 weeks ago.      HPI  Doretha Fernandez is a 46 year old female who presents with weakness and fatigue as well as body aches for about the last 5 days.      Doretha, who has a complex history involving metastatic melanoma, RA, UC, diabetes, presents with a 5-day history of weakness and generalized body aches.  She has bad enough symptoms that she finds it difficulty to do simple daily activities like holding onto the steering wheel while driving.    She has some nausea but is not vomiting.  She has not noticed fever.  Her ileostomy is putting out soft or liquid stool.    Recent changes in her treatments involve a total colectomy performed 7 weeks ago.  She also most recently had a change of medications going on Victoza and 2000 mg of Metformin daily for diabetes.  She also just started on phentermine.  She was previously on insulin but this was discontinued.    In regard to her melanoma, she underwent chemotherapy and is considered in remission at this point.  It sounds like she is having more trouble with ERA and UC issues.  Diabetes was a result of taking prednisone in the past.  She is currently on prednisone 5 mg q. OD.    Her: GI specialists are at the [facility].  Her oncologist is at [place].  Her rheumatologist is at [place].  Primary doctor is in [facility].          Allergies:  Allergies   Allergen Reactions     Bee Venom Swelling     Azithromycin Diarrhea     Erythromycin      Other reaction(s): GI intolerance, Vomiting     Fentanyl Other (See Comments)     sweating  sweating     Prochlorperazine Fatigue     Other reaction(s): Other (see comments)  Fatigue     Buspirone      Other reaction(s): GI intolerance  vomiting     Erythrocin Nausea and Vomiting     Zithromax [Azithromycin Dihydrate] Diarrhea     Enbrel [Etanercept] Hives and Rash       Problem List:     Patient Active Problem List    Diagnosis Date Noted     Migraines      Priority: Medium     Colostomy in place (H) 07/16/2021     Priority: Medium     S/P colectomy 07/16/2021     Priority: Medium     Ulcerative colitis without complications, unspecified location (H) 06/03/2021     Priority: Medium     Dehydration 04/13/2021     Priority: Medium     Hematochezia 04/13/2021     Priority: Medium     Diarrhea of infectious origin 04/13/2021     Priority: Medium     C. difficile colitis 04/13/2021     Priority: Medium     Adjustment disorder with mixed emotional features 06/16/2020     Priority: Medium     Polyarthralgia 04/29/2020     Priority: Medium     Drug-induced polyneuropathy (H) 04/29/2020     Priority: Medium     Pain syndrome, chronic 02/12/2020     Priority: Medium     Drug-induced Cushing's syndrome (H) 02/12/2020     Priority: Medium     Diabetes mellitus, iatrogenic (H) 01/28/2020     Priority: Medium     High risk HPV infection 01/28/2020     Priority: Medium     Added automatically from request for surgery [id number]       Immunosuppression (H) 01/28/2020     Priority: Medium     Added automatically from request for surgery [id number]       STORMY (obstructive sleep apnea) 01/27/2020     Priority: Medium     1/2019 (241#)-AHI 24, lowest oxygen saturation was 86%, no periodic limb movement were noted, CPAP 8 cm/H20 was effective.       Secondary lymphedema 01/27/2020     Priority: Medium     Chronic neutrophilia 01/27/2020     Priority: Medium     Cervical high risk HPV (human papillomavirus) test positive 12/13/2019     Priority: Medium     2011 NIL pap.  2015 NIL pap, Neg HPV.  12/13/19 NIL pap , + HR HPV 16. Plan colp.   1/6/19 Failed colp exam secondary to anatomic constraints with gyn. Referred to gyn/onc. Tracking ended.        Immunosuppressed status (H) 09/05/2019     Priority: Medium     Ulcerative colitis with complication, unspecified location (H) 09/05/2019     Priority: Medium     Morbid obesity  (H) 09/05/2019     Priority: Medium     Arthritis, rheumatoid (H) 11/26/2018     Priority: Medium     Hypocalcemia 05/15/2018     Priority: Medium     Anemia 05/14/2018     Priority: Medium     Menstrual irregularity 05/14/2018     Priority: Medium     Arthralgia of both knees 05/12/2018     Priority: Medium     Formatting of this note might be different from the original.  Work-up in progress. There is concern for inflammatory arthritis.       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     Priority: Medium     Malignant melanoma of left upper extremity including shoulder (H) 10/12/2017     Priority: Medium     Metastatic malignant melanoma (H) 10/10/2017     Priority: Medium     Prothrombin mutation (H) 04/05/2017     Priority: Medium     On daily aspirin 81 mg per hematology's recommendations from 2008       Vision changes 04/01/2017     Priority: Medium     Mild intermittent asthma without complication 11/08/2013     Priority: Medium     Moderate major depression (H) 06/24/2013     Priority: Medium     Anxiety state 09/13/2012     Priority: Medium     Problem list name updated by automated process. Provider to review       Esophageal reflux 09/13/2012     Priority: Medium     DUB (dysfunctional uterine bleeding) 07/28/2011     Priority: Medium     CARDIOVASCULAR SCREENING; LDL GOAL LESS THAN 160 07/28/2011     Priority: Low        Past Medical History:    Past Medical History:    Diagnosis Date     Abnormal MRI      Anxiety      Basal cell carcinoma      Cervical high risk HPV (human papillomavirus) test positive 2019     Colitis      Depression      Diabetes mellitus, iatrogenic (H) 2020     Esophageal reflux      Inflammatory arthritis      Insomnia      Intestinal giardiasis 3/5/2018     Lumbago      Lymphedema      Malignant melanoma (H)      Melanoma (H) 10/23/2017     Migraines      Mild persistent asthma      Morbid obesity with BMI of 40.0-44.9, adult (H)      STORMY (obstructive sleep apnea)      Prothrombin deficiency (H)      Stroke (cerebrum) (H)      TIA (transient ischemic attack)      Type 2 diabetes mellitus (H)        Past Surgical History:    Past Surgical History:   Procedure Laterality Date     APPENDECTOMY       COLONOSCOPY N/A 10/18/2017    Procedure: COLONOSCOPY;  Colon;  Surgeon: Debbie Stephens MD;  [facility]     COLONOSCOPY N/A 3/9/2018    Procedure: COMBINED COLONOSCOPY, SINGLE OR MULTIPLE BIOPSY/POLYPECTOMY BY BIOPSY;  colon;  Surgeon: Benita Schumacher MD;  Location: [facility] GI     COLONOSCOPY      multiple since  to present - about 6 total     DISSECT LYMPH NODE AXILLA Left 10/23/2017    Procedure: DISSECT LYMPH NODE AXILLA;  Left Axillary Lymph Node Dissection ;  Surgeon: Laurent Cool MD;  Location: [facility]     EXAM UNDER ANESTHESIA PELVIC N/A 2020    Procedure: EXAM UNDER ANESTHESIA, PELVIS; with Cervical Biopsies, Vaginal Biopsy and Endocervical Curettings;  Surgeon: Melina Jung MD;  Location: [facility] OR     GYN SURGERY  ,          LAPAROSCOPIC ASSISTED COLECTOMY N/A 6/15/2021    Procedure: laparoscopic total abdominal colectomy, end ileostomy;  Surgeon: Quinton Ram MD;  Location: [facility] OR     REPAIR MOHS Left 2017    Procedure: REPAIR MOHS;  Left Upper Lid Moh's Reconstruction;  Surgeon: Kisha Bosch MD;  [facility]       Family History:    Family History   Problem Relation Age of  Onset     Cancer Mother 45        lung     Neurologic Disorder Mother         epilepsy     Lipids Father      Gastrointestinal Disease Father         diverticulitis      Depression Father      Colitis Father      Colon Cancer Father      Diverticulitis Father      Cancer Maternal Grandmother      Blood Disease Maternal Grandmother         lymphoma      Arthritis Maternal Grandmother      Diabetes Maternal Grandmother      Depression Maternal Grandmother      Macular Degeneration Maternal Grandmother      Glaucoma Maternal Grandmother      Diabetes Maternal Grandfather      Cerebrovascular Disease Maternal Grandfather      Blood Disease Maternal Grandfather      Heart Disease Maternal Grandfather      Glaucoma Maternal Grandfather      Cancer Paternal Grandmother      Cancer - colorectal Paternal Grandmother      Colitis Paternal Grandmother      Colon Cancer Paternal Grandmother      Diverticulitis Paternal Grandmother      Respiratory Paternal Grandfather         emphysema      Colitis Paternal Grandfather      Colon Cancer Paternal Grandfather      Diverticulitis Paternal Grandfather      Heart Disease Daughter      Asthma Daughter      Depression Sister      Melanoma No family hx of        Social History:  Marital Status:   [4]  Social History     Tobacco Use     Smoking status: Former Smoker     Packs/day: 1.00     Years: 5.00     Pack years: 5.00     Quit date: 3/20/1998     Years since quittin.4     Smokeless tobacco: Never Used   Vaping Use     Vaping Use: Never used   Substance Use Topics     Alcohol use: Yes     Comment: occ     Drug use: No        Medications:    HYDROcodone-acetaminophen (NORCO) 5-325 MG tablet  acetaminophen (TYLENOL) 325 MG tablet  Alcohol Swabs (ALCOHOL PREP) 70 % PADS  amLODIPine (NORVASC) 5 MG tablet  blood glucose (NO BRAND SPECIFIED) lancets standard  blood glucose (NO BRAND SPECIFIED) test strip  Calcium Carb-Cholecalciferol 600-800 MG-UNIT TABS  calcium carbonate  (TUMS) 500 MG chewable tablet  gabapentin (NEURONTIN) 600 MG tablet  hydrOXYzine (ATARAX) 25 MG tablet  insulin glargine (LANTUS PEN) 100 UNIT/ML pen  insulin lispro (HUMALOG KWIKPEN) 100 UNIT/ML (1 unit dial) KWIKPEN  liraglutide (VICTOZA) 18 MG/3ML solution  metFORMIN (GLUCOPHAGE) 500 MG tablet  Ostomy Supplies MISC  pantoprazole (PROTONIX) 40 MG EC tablet  phentermine (ADIPEX-P) 37.5 MG tablet  predniSONE (DELTASONE) 20 MG tablet  rosuvastatin (CRESTOR) 10 MG tablet  simethicone (MYLICON) 80 MG chewable tablet  triamcinolone (KENALOG) 0.1 % paste  venlafaxine (EFFEXOR) 75 MG tablet  VITAMIN D3 25 MCG (1000 UT) tablet  VITRON-C  MG TABS tablet          Review of Systems   Constitutional: Negative for chills and fever.   HENT: Negative for congestion and sore throat.    Eyes: Negative for redness.   Respiratory: Negative for cough and shortness of breath.    Cardiovascular: Negative for chest pain.   Gastrointestinal: Positive for nausea. Negative for abdominal distention and vomiting.   Genitourinary: Negative for dysuria.   Musculoskeletal: Positive for myalgias.   Skin: Negative for rash.   Neurological: Positive for weakness. Negative for speech difficulty and light-headedness.   Hematological: Does not bruise/bleed easily.   Psychiatric/Behavioral: Negative.        Physical Exam   BP: 117/83  Pulse: 89  Temp: 97.7  F (36.5  C)  Resp: 16  Weight: 108.9 kg (240 lb)  SpO2: 97 %      Physical Exam  Constitutional:       General: She is not in acute distress.  HENT:      Head: Normocephalic.      Mouth/Throat:      Mouth: Mucous membranes are moist.      Pharynx: Oropharynx is clear. No posterior oropharyngeal erythema.   Eyes:      General: No scleral icterus.     Conjunctiva/sclera: Conjunctivae normal.      Pupils: Pupils are equal, round, and reactive to light.   Cardiovascular:      Rate and Rhythm: Normal rate and regular rhythm.      Heart sounds: No murmur heard.     Pulmonary:      Breath sounds:  Normal breath sounds.   Abdominal:      Palpations: Abdomen is soft.      Comments: Slightly tender generally without guarding or localization.  Bowel sounds present.   Musculoskeletal:         General: No swelling or tenderness.      Cervical back: No rigidity or tenderness.   Lymphadenopathy:      Cervical: No cervical adenopathy.   Skin:     Findings: No rash.   Neurological:      General: No focal deficit present.      Mental Status: She is alert.   Psychiatric:         Mood and Affect: Mood normal.         ED Course       2:07 PM: Patient's medical record was reviewed, patient interviewed and examined, initial work-up ordered after discussion.         Procedures              Critical Care time:  none               Results for orders placed or performed during the hospital encounter of 08/15/21 (from the past 24 hour(s))   Comprehensive metabolic panel   Result Value Ref Range    Sodium 139 133 - 144 mmol/L    Potassium 4.1 3.4 - 5.3 mmol/L    Chloride 108 94 - 109 mmol/L    Carbon Dioxide (CO2) 23 20 - 32 mmol/L    Anion Gap 8 3 - 14 mmol/L    Urea Nitrogen 16 7 - 30 mg/dL    Creatinine 0.66 0.52 - 1.04 mg/dL    Calcium 9.3 8.5 - 10.1 mg/dL    Glucose 116 (H) 70 - 99 mg/dL    Alkaline Phosphatase 73 40 - 150 U/L    AST 17 0 - 45 U/L    ALT 28 0 - 50 U/L    Protein Total 7.5 6.8 - 8.8 g/dL    Albumin 3.7 3.4 - 5.0 g/dL    Bilirubin Total 0.2 0.2 - 1.3 mg/dL    GFR Estimate >90 >60 mL/min/1.73m2   Magnesium   Result Value Ref Range    Magnesium 1.7 1.6 - 2.3 mg/dL   CBC with platelets differential    Narrative    The following orders were created for panel order CBC with platelets differential.  Procedure                               Abnormality         Status                     ---------                               -----------         ------                     CBC with platelets and d...[[id number]]  Abnormal            Final result                 Please view results for these tests on the individual  orders.   Lactic acid whole blood   Result Value Ref Range    Lactic Acid 2.3 (H) 0.7 - 2.0 mmol/L   CBC with platelets and differential   Result Value Ref Range    WBC Count 12.9 (H) 4.0 - 11.0 10e3/uL    RBC Count 4.18 3.80 - 5.20 10e6/uL    Hemoglobin 12.6 11.7 - 15.7 g/dL    Hematocrit 38.2 35.0 - 47.0 %    MCV 91 78 - 100 fL    MCH 30.1 26.5 - 33.0 pg    MCHC 33.0 31.5 - 36.5 g/dL    RDW 13.0 10.0 - 15.0 %    Platelet Count 336 150 - 450 10e3/uL    % Neutrophils 65 %    % Lymphocytes 21 %    % Monocytes 7 %    % Eosinophils 5 %    % Basophils 1 %    % Immature Granulocytes 1 %    NRBCs per 100 WBC 0 <1 /100    Absolute Neutrophils 8.3 1.6 - 8.3 10e3/uL    Absolute Lymphocytes 2.7 0.8 - 5.3 10e3/uL    Absolute Monocytes 0.9 0.0 - 1.3 10e3/uL    Absolute Eosinophils 0.7 0.0 - 0.7 10e3/uL    Absolute Basophils 0.1 0.0 - 0.2 10e3/uL    Absolute Immature Granulocytes 0.1 (H) <=0.0 10e3/uL    Absolute NRBCs 0.0 10e3/uL   UA with Microscopic reflex to Culture    Specimen: Urine, Clean Catch   Result Value Ref Range    Color Urine Yellow Colorless, Straw, Light Yellow, Yellow    Appearance Urine Slightly Cloudy (A) Clear    Glucose Urine Negative Negative mg/dL    Bilirubin Urine Negative Negative    Ketones Urine Negative Negative mg/dL    Specific Gravity Urine 1.030 1.003 - 1.035    Blood Urine Negative Negative    pH Urine 5.0 5.0 - 7.0    Protein Albumin Urine 30  (A) Negative mg/dL    Urobilinogen Urine Normal Normal, 2.0 mg/dL    Nitrite Urine Negative Negative    Leukocyte Esterase Urine Negative Negative    Bacteria Urine Few (A) None Seen /HPF    Mucus Urine Present (A) None Seen /LPF    RBC Urine 1 <=2 /HPF    WBC Urine 2 <=5 /HPF    Squamous Epithelials Urine 4 (H) <=1 /HPF    Hyaline Casts Urine 3 (H) <=2 /LPF    Narrative    Urine Culture not indicated       Medications - No data to display    Assessments & Plan (with Medical Decision Making)     This patient who has multiple medical problems presented  with weakness and general body aches.  Context is further spelled out above under history.  There has been a couple of recent medication changes.  She was being tapered off prednisone.  She had a colectomy 7 weeks ago.    Her exam was benign except for tenderness.    Consideration was given to multiple possibilities which could bring about above symptoms.  These could include but would not be limited to electrolyte imbalance, abnormal blood sugar, a rheumatologic phenomenon unmasked by prednisone taper, infection, and idiosyncratic reaction to recently prescribed medications or to combination of medications.    Work-up did not disclose an electrolyte imbalance or abnormal blood sugar.  There was no sign of UTI.  Minimal elevation of WBC was noted but there was no suggestion of infection to correlate clinically.    Patient has a primary care appointment tomorrow.  She will contact her rheumatologic specialist tomorrow also.    I did suggest that she temporarily stop the Victoza and phentermine which she started just recently.  I also suggested that she could increase her prednisone to 10 mg/day to see if this would be helpful.    Indications to return to ER were also discussed.  Patient voices understanding of recommendations.        I have reviewed the nursing notes.    I have reviewed the findings, diagnosis, plan and need for follow up with the patient.       Discharge Medication List as of 8/15/2021  4:42 PM      START taking these medications    Details   HYDROcodone-acetaminophen (NORCO) 5-325 MG tablet Take 1 tablet by mouth every 4 hours as needed for severe pain, Disp-15 tablet, R-0, [facility]             Final diagnoses:   Generalized muscle weakness   Body aches   Rheumatoid arthritis with positive rheumatoid factor, involving unspecified site (H)   Other ulcerative colitis without complication (H)       8/15/2021   [facility] EMERGENCY DEPT     Alex Hightower MD  08/15/21 1940        Alex Hightower MD  08/16/21 1412       Alex Hightower MD  08/16/21 5021

## 2021-08-15 NOTE — ED TRIAGE NOTES
"fatigue, weakness and all over body aches. thirsty. Hx of colectomy 7 weeks ago. Started new meds this week, insulin pen, and  Phentermine. Has sores in mouth \" reminds me of UC\"     "

## 2021-08-15 NOTE — DISCHARGE INSTRUCTIONS
I recommend taking additional 5 mg Prednisone today.    Then change Prednisone to 10 mg daily.    Hold off on Victoza and phentermine for now.    Discuss matters with your rheumatologist tomorrow.    Complete your primary care appointment tomorrow also.    For worsening symptoms, consider presenting to the ER at the [facility].

## 2021-08-16 ENCOUNTER — OFFICE VISIT (OUTPATIENT)
Dept: FAMILY MEDICINE | Facility: CLINIC | Age: 46
End: 2021-08-16
Payer: COMMERCIAL

## 2021-08-16 ENCOUNTER — VIRTUAL VISIT (OUTPATIENT)
Dept: SURGERY | Facility: CLINIC | Age: 46
End: 2021-08-16
Payer: COMMERCIAL

## 2021-08-16 ENCOUNTER — PATIENT OUTREACH (OUTPATIENT)
Dept: CARE COORDINATION | Facility: CLINIC | Age: 46
End: 2021-08-16

## 2021-08-16 VITALS
RESPIRATION RATE: 16 BRPM | BODY MASS INDEX: 42.88 KG/M2 | TEMPERATURE: 67.4 F | DIASTOLIC BLOOD PRESSURE: 84 MMHG | SYSTOLIC BLOOD PRESSURE: 120 MMHG | HEIGHT: 63 IN | WEIGHT: 242 LBS | OXYGEN SATURATION: 99 % | HEART RATE: 88 BPM

## 2021-08-16 DIAGNOSIS — M79.10 MYALGIA: Primary | ICD-10-CM

## 2021-08-16 DIAGNOSIS — E66.9 OBESITY: Primary | ICD-10-CM

## 2021-08-16 DIAGNOSIS — E55.9 VITAMIN D DEFICIENCY: ICD-10-CM

## 2021-08-16 DIAGNOSIS — R19.7 DIARRHEA, UNSPECIFIED TYPE: ICD-10-CM

## 2021-08-16 DIAGNOSIS — M62.81 GENERALIZED MUSCLE WEAKNESS: Primary | ICD-10-CM

## 2021-08-16 LAB
CK SERPL-CCNC: 37 U/L (ref 30–225)
CRP SERPL-MCNC: 20.3 MG/L (ref 0–8)
DEPRECATED CALCIDIOL+CALCIFEROL SERPL-MC: 57 UG/L (ref 20–75)
ERYTHROCYTE [SEDIMENTATION RATE] IN BLOOD BY WESTERGREN METHOD: 21 MM/HR (ref 0–20)
VIT B12 SERPL-MCNC: 549 PG/ML (ref 193–986)

## 2021-08-16 PROCEDURE — 99207 PR MWM HEALTH COACH NO CHARGE: CPT

## 2021-08-16 PROCEDURE — 82550 ASSAY OF CK (CPK): CPT | Performed by: FAMILY MEDICINE

## 2021-08-16 PROCEDURE — 99213 OFFICE O/P EST LOW 20 MIN: CPT | Performed by: FAMILY MEDICINE

## 2021-08-16 PROCEDURE — 36415 COLL VENOUS BLD VENIPUNCTURE: CPT | Performed by: FAMILY MEDICINE

## 2021-08-16 PROCEDURE — 82306 VITAMIN D 25 HYDROXY: CPT | Performed by: FAMILY MEDICINE

## 2021-08-16 PROCEDURE — 82607 VITAMIN B-12: CPT | Performed by: FAMILY MEDICINE

## 2021-08-16 PROCEDURE — 86140 C-REACTIVE PROTEIN: CPT | Performed by: FAMILY MEDICINE

## 2021-08-16 PROCEDURE — 85652 RBC SED RATE AUTOMATED: CPT | Performed by: FAMILY MEDICINE

## 2021-08-16 RX ORDER — LANOLIN ALCOHOL/MO/W.PET/CERES
1000 CREAM (GRAM) TOPICAL DAILY
Status: ON HOLD | COMMUNITY
Start: 2021-08-16 | End: 2023-09-06

## 2021-08-16 ASSESSMENT — ACTIVITIES OF DAILY LIVING (ADL): DEPENDENT_IADLS:: CLEANING;TRANSPORTATION

## 2021-08-16 ASSESSMENT — MIFFLIN-ST. JEOR: SCORE: 1711.83

## 2021-08-16 NOTE — PROGRESS NOTES
Reason for Visit: 24-Week Healthy Lifestyle Plan Follow up Visit - Health Coaching    Progress Notes:  Return 24-Week Healthy Lifestyle Plan Weight Management Health Coaching Note  Doretha Fernandez   MRN: [id number]  : 1975  JUNIOR: 2021    Health  Follow-up Visit #:  2  Telephone Visit: yes  Initial Start Date:  2021  Graduation Date:  2022  [employer] (online resource) enrollment date(s):  2021  Physician:  Dr. Lindsey  Initial Weight (lbs): 242 lbs.  Current Weight (lbs): 240 lbs. (goal of under 200 lbs so she can have next surgery - J-pouch)   Average Daily Water (ounces): did not report today (oz/day)  Weight Loss Medication: phentermine  Nutrition Plan: real whole foods    Level of Satisfaction:   Rate your current level of [id number] on a scale of 1-10 in each pillar.      Nutrition (1 -10): 3    Movement/Activity (1 -10): 1    Sleep (1 -10): 1    Stress Management (1 -10): 4       PILLAR(S) DISCUSSED IN THIS VISIT:  Nutrition: knows what is recommended to eat first protein, chicken, etc. Has recently received info from SUAD De La Vega with ideas for what to eat each day. Struggling with ideas and how her body is responding along with her colostomy bag. Sugar has significantly  Exercise/Movement: in constant pain  Sleep: uses a CPAP and snoring when uses her CPAP, needs a new sleep study.  Stress Management: has a therapist, ground herself, feet touching the ground & breathe, journaling      GOALS:   Sleep: call and schedule sleep study and check her CPAP machine  Stress Management: go to the cabin (Thursday thru Saturday), stress-free and relax  Other: Joanna to message Dr. Lindsey regarding Victoza and ER doc who rec. She stopped taking it as of 8/15/21  Waiting on blood tests to determine next plan for Predisonze dosage etc.      NOTES:    Has a daughter  Emotionally drained and mentally exhausted  Lots of health issues  predisone for 3 years, 60 lbs.  Diabetes  High  cholesterol  Cancer survivor - metastatic melanoma  Collectomy in  (ostomy bag) J-pouch  Mentally exhausted  Would love day by day instructions of what to eat, etc.        Goals:  3 meals/day & Protein first  Start incorporating fruits and veggies into diet (starting to re-introduce if ok)  Review the list from Yolette with foods that are ok to eat (with all her health concerns)  *asked Yolette (sent message) for more information and sample meals for several days.  Doretha would like a plan set up for her. Also inquired if [employer] food products would be a good choice for her, easy to follow plan if ok with her health history. Yolette confirmed that [employer] products are ok.  Sent Doretha the online link to order if she is interested:     [email or web address]      FOLLOW-Up:  21 with Dr. Lindsey,  with Joanna for HC #3,  with SU[employer] De La Vega for f/u      Reminder:  Monthly Support Groups offered virtually via TEAMS.  Contact Dianna Zamudio ([email or web address]) to be added to the invite list.  , 12:30-1:30 pm:  Open Forum  :30-1:30 pm:  Sleep and the Seven Types of Rest  , 12:30-1:30 pm:  Open Forum  , :30-1:30 pm:  Gratitude  Friday, December 10, 12:30-1:30 pm:  Open Forum       SIGNATURE:  YUMIKO Nelson, [facility]  National Board Certified Health &   24 Week Healthy Lifestyle Program Health   [facility]  email:  [email or web address]  appointment [phone]

## 2021-08-16 NOTE — LETTER
2021         RE: Doretha Fernandez  [address]  [facility] [address]        Dear Colleague,    Thank you for referring your patient, Doretha Fernandez, to the [facility] SURGERY CLINIC AND BARIATRICS CARE [place]. Please see a copy of my visit note below.    Reason for Visit: 24-Week Healthy Lifestyle Plan Follow up Visit - Health Coaching    Progress Notes:  Return 24-Week Healthy Lifestyle Plan Weight Management Health Coaching Note  Doretha Fernandez   MRN: [id number]  : 1975  JUNIOR: 2021    Health  Follow-up Visit #:  2  Telephone Visit: yes  Initial Start Date:  2021  Graduation Date:  2022  [employer] (online resource) enrollment date(s):  2021  Physician:  Dr. Lindsey  Initial Weight (lbs): 242 lbs.  Current Weight (lbs): 240 lbs. (goal of under 200 lbs so she can have next surgery - J-pouch)   Average Daily Water (ounces): did not report today (oz/day)  Weight Loss Medication: phentermine  Nutrition Plan: real whole foods    Level of Satisfaction:   Rate your current level of [id number] on a scale of 1-10 in each pillar.      Nutrition (1 -10): 3    Movement/Activity (1 -10): 1    Sleep (1 -10): 1    Stress Management (1 -10): 4       PILLAR(S) DISCUSSED IN THIS VISIT:  Nutrition: knows what is recommended to eat first protein, chicken, etc. Has recently received info from SUAD De La Vega with ideas for what to eat each day. Struggling with ideas and how her body is responding along with her colostomy bag. Sugar has significantly  Exercise/Movement: in constant pain  Sleep: uses a CPAP and snoring when uses her CPAP, needs a new sleep study.  Stress Management: has a therapist, ground herself, feet touching the ground & breathe, journaling      GOALS:   Sleep: call and schedule sleep study and check her CPAP machine  Stress Management: go to the cabin (Thursday thru Saturday), stress-free and relax  Other: Joanna to message Dr. Lindsey regarding Victoza and ER  doc who rec. She stopped taking it as of 8/15/21  Waiting on blood tests to determine next plan for Predisonze dosage etc.      NOTES:    Has a daughter  Emotionally drained and mentally exhausted  Lots of health issues  predisone for 3 years, 60 lbs.  Diabetes  High cholesterol  Cancer survivor - metastatic melanoma  Collectomy in  (ostomy bag) J-pouch  Mentally exhausted  Would love day by day instructions of what to eat, etc.        Goals:  3 meals/day & Protein first  Start incorporating fruits and veggies into diet (starting to re-introduce if ok)  Review the list from Yolette with foods that are ok to eat (with all her health concerns)  *asked Yolette (sent message) for more information and sample meals for several days.  Doretha would like a plan set up for her. Also inquired if Bariatrix food products would be a good choice for her, easy to follow plan if ok with her health history. Yolette confirmed that Bariatrix products are ok.  Sent Doretha the online link to order if she is interested:     [email or web address]      FOLLOW-Up:  21 with Dr. Lindsey,  with Joanna for HC #3,  with SUAD De La Vega for f/u      Reminder:  Monthly Support Groups offered virtually via TEAMS.  Contact Dianna Zamudio [email or web address]) to be added to the invite list.  , 12:30-1:30 pm:  Open Forum  :30-1:30 pm:  Sleep and the Seven Types of Rest  :30-1:30 pm:  Open Forum  , :30-1:30 pm:  Gratitude  Friday, December 10, 12:30-1:30 pm:  Open Forum       SIGNATURE:  YUMIKO Nelson, [facility]  National Board Certified Health &   24 Week Healthy Lifestyle Program Health   [facility] Weight Management Program  email:  [email or web address]  appointment [phone]      Again, thank you for allowing me to participate in the care of your patient.         Sincerely,        Joanna Ojeda

## 2021-08-16 NOTE — LETTER
[facility] COORDIN[facility]  [address]  [place] [address]  August 23, 2021    Doretha Fernandez  [address] [facility] [place] [address]      Dear Doretha,    I have been unsuccessful in reaching you since our last contact. At this time the Care Coordination team will make no further attempts to reach you, however this does not change your ability to continue receiving care from your providers at your primary care clinic. If you need additional support from a care coordinator in the future please contact me at [phone].    All of us at [facility] are invested in your health and are here to assist you in meeting your goals.     Sincerely,    [facility]   Gisel Marte RN, Care Coordinator   [facility]'s   E-mail [email or web address]   [phone]

## 2021-08-16 NOTE — PROGRESS NOTES
"    Assessment & Plan     Myalgia  Improving  There is some dryness on her forehead  Check ESR/CRP - we have values from a month ago at [place] to compare them to  Continue prednisone but as feeling better I would recommend going back to 5 mg daily  Follow up with rheum at [place]    - CK total; Future  - ESR: Erythrocyte sedimentation rate; Future  - CRP, inflammation; Future  - Vitamin D Deficiency; Future  - Vitamin B12; Future  - CK total  - ESR: Erythrocyte sedimentation rate  - CRP, inflammation  - Vitamin D Deficiency  - Vitamin B12    Vitamin D deficiency     - Vitamin D Deficiency; Future  - Vitamin D Deficiency    Diarrhea, unspecified type  Likely secondary to the Victoza.  I wonder if she got dehydrated and then had increased pain/myalgias, etc.     Remain off the victoza until talking to the weight loss clinic - they may want to try something else.                  No follow-ups on file.    Anna Naidu MD  [facility]   Doretha is a 46 year old who presents for the following health issues     HPI     Acute Illness  Acute illness concerns: body aches fatigue  Onset/Duration: x 6 days   Symptoms:  Fever: no  Chills/Sweats: no  Headache (location?): YES  Sinus Pressure: no  Conjunctivitis:  no  Ear Pain: no  Rhinorrhea: no  Congestion: no  Sore Throat: no  Cough: no  Wheeze: no  Decreased Appetite: no  Nausea: no  Vomiting: no  Diarrhea: no - pt has ostomy bag  Dysuria/Freq.: no  Dysuria or Hematuria: no  Fatigue/Achiness: YES  Sick/Strep Exposure: no  Therapies tried and outcome: None      1 week ago was \"doing good\".  Took daughter to MOA and then by the evening was more tired, exhausted. Joints hurt.  It's exhausting keeping her head up.     Started Victoza on Monday and started Phentermine on Monday.  For five days last week had to empty her ileostomy bag five times a day.  Looking at SE profile on Victoza 20+% of people get diarrhea.  Patient did not correlate this " "as the ostomy is still rather new for her.     Less stoma output in the past day or two.   Increased prednisone to 10 mg yesterday and today.  Feeling a little bit better.       Review of Systems   Some left sided abdominal pain since the ER doctor palpated her abdomen yesterday.        Objective    /84 (BP Location: Right arm, Patient Position: Sitting, Cuff Size: Adult Large)   Pulse 88   Temp (!) 67.4  F (19.7  C) (Tympanic)   Resp 16   Ht 1.6 m (5' 3\")   Wt 109.8 kg (242 lb)   SpO2 99%   Breastfeeding No   BMI 42.87 kg/m    Body mass index is 42.87 kg/m .  Physical Exam   GENERAL: healthy, alert and no distress  NECK: no adenopathy, no asymmetry, masses, or scars and thyroid normal to palpation  RESP: lungs clear to auscultation - no rales, rhonchi or wheezes  CV: regular rate and rhythm, normal S1 S2, no S3 or S4, no murmur, click or rub, no peripheral edema and peripheral pulses strong  ABDOMEN: soft, nontender, no hepatosplenomegaly, no masses and bowel sounds normal  MS: no gross musculoskeletal defects noted, no edema  PSYCH: mentation appears normal and tearful  Skin: forehead dry/scaly    ER labs reviewed              "

## 2021-08-16 NOTE — PROGRESS NOTES
Clinic Care Coordination Contact  [facility]/Voicemail    Referral Source: ED Follow-Up  ED visit 8/15/2021 Generalized muscle weakness   Clinical Data: Care Coordinator Outreach  Outreach attempted x 1.  Left message on patient's voicemail with call back information and requested return call.  Plan: . Care Coordinator will try to reach patient again in 1-2 business days.  [facility]   Gisel Marte RN, Care Coordinator   [facility]'s   E-mail [email or web address]   [phone]

## 2021-08-17 ENCOUNTER — TELEPHONE (OUTPATIENT)
Dept: SURGERY | Facility: CLINIC | Age: 46
End: 2021-08-17

## 2021-08-17 NOTE — RESULT ENCOUNTER NOTE
Doretha,    All of the labs were normal or acceptable.    Please contact my office if you have questions.    Anna Naidu M.D.

## 2021-08-17 NOTE — TELEPHONE ENCOUNTER
Spoke to oDretha who was in the ER with muscle cramps thought d/t dehydration. Had increased output from her ostomy and has started Victoza 5 days before that. She had increased her metformin to 1,000mg BID from daily and did not notice increased ostomy output from that and now, stopping the Victoza and increasing her prednisone, she is feeling better. Her inflammatory markers were up in the ER. Discussed continuing the phentermine and metformin while on prednisone and taking hydroxyzine and melatonin as needed for sleep. She will wean off of the prednisone as symptoms allow and potentially give Victoza another try when everything is stable.

## 2021-08-20 ENCOUNTER — OFFICE VISIT (OUTPATIENT)
Dept: FAMILY MEDICINE | Facility: CLINIC | Age: 46
End: 2021-08-20
Payer: COMMERCIAL

## 2021-08-20 VITALS
DIASTOLIC BLOOD PRESSURE: 80 MMHG | OXYGEN SATURATION: 98 % | HEIGHT: 63 IN | BODY MASS INDEX: 42.35 KG/M2 | TEMPERATURE: 97.7 F | RESPIRATION RATE: 18 BRPM | WEIGHT: 239 LBS | HEART RATE: 89 BPM | SYSTOLIC BLOOD PRESSURE: 118 MMHG

## 2021-08-20 DIAGNOSIS — R87.810 CERVICAL HIGH RISK HPV (HUMAN PAPILLOMAVIRUS) TEST POSITIVE: Primary | ICD-10-CM

## 2021-08-20 PROCEDURE — G0145 SCR C/V CYTO,THINLAYER,RESCR: HCPCS | Performed by: FAMILY MEDICINE

## 2021-08-20 PROCEDURE — 99213 OFFICE O/P EST LOW 20 MIN: CPT | Performed by: FAMILY MEDICINE

## 2021-08-20 PROCEDURE — 87624 HPV HI-RISK TYP POOLED RSLT: CPT | Performed by: FAMILY MEDICINE

## 2021-08-20 ASSESSMENT — ASTHMA QUESTIONNAIRES
QUESTION_5 LAST FOUR WEEKS HOW WOULD YOU RATE YOUR ASTHMA CONTROL: COMPLETELY CONTROLLED
ACT_TOTALSCORE: 25
QUESTION_4 LAST FOUR WEEKS HOW OFTEN HAVE YOU USED YOUR RESCUE INHALER OR NEBULIZER MEDICATION (SUCH AS ALBUTEROL): NOT AT ALL
QUESTION_3 LAST FOUR WEEKS HOW OFTEN DID YOUR ASTHMA SYMPTOMS (WHEEZING, COUGHING, SHORTNESS OF BREATH, CHEST TIGHTNESS OR PAIN) WAKE YOU UP AT NIGHT OR EARLIER THAN USUAL IN THE MORNING: NOT AT ALL
QUESTION_1 LAST FOUR WEEKS HOW MUCH OF THE TIME DID YOUR ASTHMA KEEP YOU FROM GETTING AS MUCH DONE AT WORK, SCHOOL OR AT HOME: NONE OF THE TIME
QUESTION_2 LAST FOUR WEEKS HOW OFTEN HAVE YOU HAD SHORTNESS OF BREATH: NOT AT ALL

## 2021-08-20 ASSESSMENT — PAIN SCALES - GENERAL: PAINLEVEL: SEVERE PAIN (7)

## 2021-08-20 ASSESSMENT — MIFFLIN-ST. JEOR: SCORE: 1698.23

## 2021-08-20 NOTE — PATIENT INSTRUCTIONS
Our Clinic hours are:  Mondays    7:20 am - 7 pm  Tues -  Fri  7:20 am - 5 pm    Clinic Phone: [phone]    The clinic lab opens at 7:30 am Mon - Fri and appointments are required.    [facility]. [phone]  Monday  8 am - 7pm  Tues - Fri 8 am - 5:30 pm

## 2021-08-20 NOTE — PROGRESS NOTES
"    Assessment & Plan     Cervical high risk HPV (human papillomavirus) test positive   pap/hpv obtained  - Pap screen with HPV - recommended age 30 - 65 years                 No follow-ups on file.    Anna Naidu MD  [facility]    Lance Coyne is a 46 year old who presents for the following health issues     HPI     Chief Complaint   Patient presents with     Gyn Exam     Pap     Forms     Patient has paperwork to get money back from a plane ticket due to having procedures.      Patient with NIL pap 1/2020 with HPV +16.  Had coloposcopy under anesthesia.  Biopsies all negative.  Told to have pap/hpv in 1 year (that was 2/25/2020).  Patient is following up now.     H/o malignant melanoma, has been on steroids so immunosuppressed.         Review of Systems         Objective    /80   Pulse 89   Temp 97.7  F (36.5  C) (Tympanic)   Resp 18   Ht 1.6 m (5' 3\")   Wt 108.4 kg (239 lb)   LMP 08/09/2021   SpO2 98%   BMI 42.34 kg/m    Body mass index is 42.34 kg/m .  Physical Exam   GENERAL APPEARANCE: healthy, alert and no distress   (female): normal cervix, adnexae, and uterus without masses or discharge and pap obtained                "

## 2021-08-21 ASSESSMENT — ASTHMA QUESTIONNAIRES: ACT_TOTALSCORE: 25

## 2021-08-23 NOTE — PROGRESS NOTES
No return calls from patients after multiple outreaches made  Dis-enrollment letter mailed   [facility]   Gisel Marte RN, Care Coordinator   [facility]'s   E-mail [email or web address]   [phone]

## 2021-08-25 LAB
BKR LAB AP GYN ADEQUACY: NORMAL
BKR LAB AP GYN INTERPRETATION: NORMAL
BKR LAB AP HPV REFLEX: NORMAL
BKR LAB AP PREVIOUS ABNL DX: NORMAL
BKR LAB AP PREVIOUS ABNORMAL: NORMAL
PATH REPORT.COMMENTS IMP SPEC: NORMAL
PATH REPORT.RELEVANT HX SPEC: NORMAL

## 2021-08-26 LAB
HUMAN PAPILLOMA VIRUS 16 DNA: NEGATIVE
HUMAN PAPILLOMA VIRUS 18 DNA: NEGATIVE
HUMAN PAPILLOMA VIRUS FINAL DIAGNOSIS: NORMAL
HUMAN PAPILLOMA VIRUS OTHER HR: NEGATIVE

## 2021-08-26 NOTE — ADDENDUM NOTE
Addended by: RIGO TINAJERO on: 8/26/2021 11:57 AM     Modules accepted: Orders, Level of Service

## 2021-08-30 ENCOUNTER — PATIENT OUTREACH (OUTPATIENT)
Dept: FAMILY MEDICINE | Facility: CLINIC | Age: 46
End: 2021-08-30

## 2021-09-01 ENCOUNTER — VIRTUAL VISIT (OUTPATIENT)
Dept: SURGERY | Facility: CLINIC | Age: 46
End: 2021-09-01
Payer: COMMERCIAL

## 2021-09-01 DIAGNOSIS — R19.8 HIGH OUTPUT ILEOSTOMY (H): Primary | ICD-10-CM

## 2021-09-01 DIAGNOSIS — Z93.2 HIGH OUTPUT ILEOSTOMY (H): Primary | ICD-10-CM

## 2021-09-01 DIAGNOSIS — E66.01 MORBID OBESITY (H): Primary | ICD-10-CM

## 2021-09-01 PROCEDURE — 99215 OFFICE O/P EST HI 40 MIN: CPT | Mod: 95 | Performed by: FAMILY MEDICINE

## 2021-09-01 RX ORDER — LOPERAMIDE HYDROCHLORIDE 2 MG/1
TABLET ORAL
Qty: 30 TABLET | Refills: 0 | Status: SHIPPED | OUTPATIENT
Start: 2021-09-01 | End: 2021-09-01

## 2021-09-01 NOTE — PROGRESS NOTES
Doretha Fernandez is 46 year old  female who presents for a billable video visit today.    How would you like to obtain your AVS? MyChart  If dropped from the video visit, the video invitation should be resent by: Send to e-mail at: [email or web address]  Will anyone else be joining your video visit? No      Video Start Time: 11:37    Bariatric Follow-up    46 year old  female BMI:There is no height or weight on file to calculate BMI.    Weight:   Wt Readings from Last 1 Encounters:   08/20/21 108.4 kg (239 lb)    pounds    Comorbidities:  Patient Active Problem List   Diagnosis     CARDIOVASCULAR SCREENING; LDL GOAL LESS THAN 160     DUB (dysfunctional uterine bleeding)     Anxiety state     Esophageal reflux     Moderate major depression (H)     Mild intermittent asthma without complication     Vision changes     Prothrombin mutation (H)     Metastatic malignant melanoma (H)     Malignant melanoma of left upper extremity including shoulder (H)     Functional diarrhea     Colitis     Rectal bleeding     Bilateral leg cramps     Rash and nonspecific skin eruption     Other chronic pain     Immunosuppressed status (H)     Ulcerative colitis with complication, unspecified location (H)     Morbid obesity (H)     Cervical high risk HPV (human papillomavirus) test positive     STORMY (obstructive sleep apnea)     Secondary lymphedema     Chronic neutrophilia     Diabetes mellitus, iatrogenic (H)     High risk HPV infection     Abdominal pain     Anemia     Candidiasis of mouth     Hypocalcemia     Malaise     Menstrual irregularity     Arthritis, rheumatoid (H)     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 difficile colitis     Ulcerative colitis without complications, unspecified location (H)     Colostomy in place (H)      S/P colectomy     Polyarthralgia     Drug-induced polyneuropathy (H)     Arthralgia of both knees     Adjustment disorder with mixed emotional features     Migraines         Interim: Not feeling well. Joint pain, rashes, hair loss, prednisone increased to 30mg daily. Working with 24 week program. Has gained back the weight back. Very Frustrated. Has the [employer] ascencion and was using until prednisone was increased. Now knows it wasn't the Victoza caosing her    Plan:  DIET  Continue 3 meals, protein first. Discussed 10/10 rule and FOODUCATE ascencion   EXERCISE now at 3,000 steps. Work toward 8,000 steps at your pace. Walk outside which will help with sleep   PHARMACOTHERAPY restart Victoza at 0.6mg, continue phentermine and metformin    Stay the course. Continue working with health  and dietitian.      -We reviewed her medications and whether associated with weight gain.    Current Outpatient Medications:      acetaminophen (TYLENOL) 325 MG tablet, Take 3 tablets (975 mg) by mouth every 8 hours As scheduled for the next 7-10 days, then decrease both dose & frequency to as needed there after., Disp: 90 tablet, Rfl: 0     Alcohol Swabs (ALCOHOL PREP) 70 % PADS, 1 applicator 3 times daily, Disp: 100 each, Rfl: 0     amLODIPine (NORVASC) 5 MG tablet, Take 1 tablet (5 mg) by mouth daily, Disp: 90 tablet, Rfl: 3     blood glucose (NO BRAND SPECIFIED) lancets standard, Use to test blood sugar 3 times daily or as directed., Disp: 1 each, Rfl: 0     blood glucose (NO BRAND SPECIFIED) test strip, Use to test blood sugar 3 times daily or as directed., Disp: 200 strip, Rfl: 0     Calcium Carb-Cholecalciferol 600-800 MG-UNIT TABS, Take 800 mg by mouth every morning (before breakfast), Disp: 90 tablet, Rfl: 3     calcium carbonate (TUMS) 500 MG chewable tablet, Take 1 tablet (500 mg) by mouth 3 times daily as needed for heartburn, Disp: 30 tablet, Rfl: 0     cyanocobalamin (VITAMIN B-12) 1000 MCG tablet, Take 1 tablet  (1,000 mcg) by mouth daily, Disp: , Rfl:      gabapentin (NEURONTIN) 600 MG tablet, Take 1 tablet (600 mg) by mouth 4 times daily, Disp: 120 tablet, Rfl: 0     hydrOXYzine (ATARAX) 25 MG tablet, Take 1 tablet (25 mg) by mouth every 6 hours as needed for anxiety, Disp: 40 tablet, Rfl: 0     insulin glargine (LANTUS PEN) 100 UNIT/ML pen, Inject 4 Units Subcutaneous At Bedtime, Disp:  , Rfl:      insulin lispro (HUMALOG KWIKPEN) 100 UNIT/ML (1 unit dial) KWIKPEN, Inject 1-7 Units Subcutaneous 4 times daily (with meals and nightly) Per the insulin sliding scale provided in the discharge summary., Disp: 3 mL, Rfl: 2     metFORMIN (GLUCOPHAGE) 500 MG tablet, Take 2 tablets (1,000 mg) by mouth 2 times daily (with meals), Disp: 360 tablet, Rfl: 3     Ostomy Supplies MISC, 20 each daily, Disp: 20 each, Rfl: 11     pantoprazole (PROTONIX) 40 MG EC tablet, Take 1 tablet (40 mg) by mouth every morning (before breakfast), Disp: 90 tablet, Rfl: 3     phentermine (ADIPEX-P) 37.5 MG tablet, Take 0.5-1 tablets (18.75-37.5 mg) by mouth every morning (before breakfast), Disp: 90 tablet, Rfl: 0     predniSONE (DELTASONE) 20 MG tablet, Take 20 mg by mouth daily 30 mg daily, Disp:  , Rfl:      rosuvastatin (CRESTOR) 10 MG tablet, Take 1 tablet (10 mg) by mouth daily, Disp: 90 tablet, Rfl: 3     simethicone (MYLICON) 80 MG chewable tablet, Take 1-2 tablets ( mg) by mouth 4 times daily as needed for cramping, Disp: 120 tablet, Rfl: 0     triamcinolone (KENALOG) 0.1 % paste, 1 Application by Other route, Disp: , Rfl:      venlafaxine (EFFEXOR) 75 MG tablet, Take 1 tablet (75 mg) by mouth 3 times daily, Disp: 90 tablet, Rfl: 5     VITAMIN D3 25 MCG (1000 UT) tablet, TAKE THREE TABLETS BY MOUTH ONCE DAILY, Disp: 300 tablet, Rfl: 1     VITRON-C  MG TABS tablet, Take 1 tablet by mouth daily, Disp: 30 tablet, Rfl: 1  No current facility-administered medications for this visit.    Facility-Administered Medications Ordered in Other  "Visits:      lidocaine 1 % 9 mL, 9 mL, Intradermal, Once, Anna Naidu MD     sodium bicarbonate 8.4 % injection 1 mEq, 1 mEq, Intradermal, Once, Anna Naidu MD      We discussed [facility] Bariatric Basics including:  -eating 3 meals daily  -eating protein first  -eating slowly, chewing food well  -avoiding/limiting calorie containing beverages  -choosing wheat, not white with breads, crackers, pastas, kvng, bagels, tortillas, rice  -limiting restaurant or cafeteria eating to twice a week or less  -We discussed the importance of restorative sleep and stress management in maintaining a healthy weight.  -We discussed insulin resistance and glycemic index as it relates to appetite and weight control  -We discussed the National Weight Control Registry healthy weight maintenance strategies and ways to optimize metabolism.  -We discussed the importance of physical activity including cardiovascular and strength training in maintaining a healthier weight and explored viable options.    Most recent labs:  Lab Results   Component Value Date    WBC 12.9 (H) 08/15/2021    HGB 12.6 08/15/2021    HCT 38.2 08/15/2021    MCV 91 08/15/2021     08/15/2021     Lab Results   Component Value Date    CHOL 207 (H) 04/05/2021     Lab Results   Component Value Date    HDL 40 (L) 04/05/2021       Lab Results   Component Value Date    TRIG 244 (H) 04/05/2021     Lab Results   Component Value Date    ALT 28 08/15/2021    AST 17 08/15/2021    ALKPHOS 73 08/15/2021       Lab Results   Component Value Date    TSH 2.5 06/09/2020         DIETARY HISTORY  Meals Per Day: 3  Eating Protein First?: trying  Food Diary: B:yogurtand toast WW wit PB, bananas L:tuna or WW D:out to eat for daughter's orientation Mini 9\" chicken ranch Pizza at [employer]  Snacks Per Day: beef sticks from the  or cheese  Typical Snack: see above, Otto Bars  Fluid Intake: intentional  Portion Control: problematic  Calorie Containing Beverages: no  Alcohol " per week: no  Typical Protein Food Choices: variety  Choosing Whole Grains: yes  Grocery Shopping is done by: herself  Food Preparation is done by: herself  Meals at Restaurant/Cafeteria/Take Out Per Week: <2/wk  Eating at the Table:   TV is Off During Meals:     Positive Changes Since Last Visit: Maintains a 3# weight loss despite restarting prednisone  Struggling With: frustration with prednisone driving appetite and water retention    Knowledgeable in Reading Food Labels: Discussed 10/10 rule and FOODUCATE ascencion  Getting Adequate Protein: yes  Sleeping 7-8 hours/day not while on Prednisone  Stress management shopping    PHYSICAL ACTIVITY PATTERNS:  Cardiovascular: walking 3,000 steps a day  Strength Training: none    REVIEW OF SYSTEMS  GENERAL/CONSTITUTIONAL:  Fatigue: yes  CARDIOVASCULAR:  Chest Pain with Exertion: no  PULMONARY:  Dyspnea on exertion: yes  CPAP Use:   Tobacco Use: no  GASTROINTESTINAL:  GERD/Heartburn: ye  UROLOGIC:  Urinary Symptoms: no  NEUROLOGIC:  Headaches: yes  Paresthesias: no  PSYCHIATRIC:  Moods: frustrated  MUSCULOSKELETAL/RHEUMATOLOGIC  Arthralgias: yes  Myalgias: yes  ENDOCRINE:  Monitoring Blood Sugars: no  Sugars Well Controlled: unsure. Last A1c was 7.5  DERMATOLOGIC:  Rashes: plethora    PHYSICAL EXAM: (most recent vitals and today's stated weight)  Vitals: LMP 08/09/2021       GEN: Pleasant and in no acute distress  PSYCH: A&OX3,     I have reviewed the note as documented above.  This accurately captures the substance of my conversation with the patient.  Thank you for the opportunity to participate in the care of your patient.    Theresa Lindsey MD, FAAFP  [facility]  Diplomate, American Board of Obesity Medicine    Total time spent on the date of this encounter doing: chart review, review of test results, patient visit, physical exam, education, counseling, developing plan of care, and documenting = 40 minutes.          Video-Visit Details    Type of  service:  Video Visit    Video End Time (time video stopped): 12:17  Originating Location (pt. Location): Home    Distant Location (provider location):  [facility] SURGERY CLINIC AND BARIATRICS CARE [place]     Platform used for Video Visit: Cinemagram

## 2021-09-01 NOTE — LETTER
9/1/2021         RE: Doretha Fernandez  [id number]  [facility] [address]        Dear Colleague,    Thank you for referring your patient, Doretha Fernandez, to the [facility] AND BARIATRICS [facility]ARE [place]. Please see a copy of my visit note below.    Doretha Fernandez is 46 year old  female who presents for a billable video visit today.    How would you like to obtain your AVS? MyChart  If dropped from the video visit, the video invitation should be resent by: Send to e-mail at: [email or web address]  Will anyone else be joining your video visit? No      Video Start Time: 11:37    Bariatric Follow-up    46 year old  female BMI:There is no height or weight on file to calculate BMI.    Weight:   Wt Readings from Last 1 Encounters:   08/20/21 108.4 kg (239 lb)    pounds    Comorbidities:  Patient Active Problem List   Diagnosis     CARDIOVASCULAR SCREENING; LDL GOAL LESS THAN 160     DUB (dysfunctional uterine bleeding)     Anxiety state     Esophageal reflux     Moderate major depression (H)     Mild intermittent asthma without complication     Vision changes     Prothrombin mutation (H)     Metastatic malignant melanoma (H)     Malignant melanoma of left upper extremity including shoulder (H)     Functional diarrhea     Colitis     Rectal bleeding     Bilateral leg cramps     Rash and nonspecific skin eruption     Other chronic pain     Immunosuppressed status (H)     Ulcerative colitis with complication, unspecified location (H)     Morbid obesity (H)     Cervical high risk HPV (human papillomavirus) test positive     STORMY (obstructive sleep apnea)     Secondary lymphedema     Chronic neutrophilia     Diabetes mellitus, iatrogenic (H)     High risk HPV infection     Abdominal pain     Anemia     Candidiasis of mouth     Hypocalcemia     Malaise     Menstrual irregularity     Arthritis, rheumatoid (H)     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 difficile colitis     Ulcerative colitis without complications, unspecified location (H)     Colostomy in place (H)     S/P colectomy     Polyarthralgia     Drug-induced polyneuropathy (H)     Arthralgia of both knees     Adjustment disorder with mixed emotional features     Migraines         Interim: Not feeling well. Joint pain, rashes, hair loss, prednisone increased to 30mg daily. Working with 24 week program. Has gained back the weight back. Very Frustrated. Has the [employer] ascencion and was using until prednisone was increased. Now knows it wasn't the Victoza caosing her    Plan:  DIET  Continue 3 meals, protein first. Discussed 10/10 rule and FOODUCATE ascencion   EXERCISE now at 3,000 steps. Work toward 8,000 steps at your pace. Walk outside which will help with sleep   PHARMACOTHERAPY restart Victoza at 0.6mg, continue phentermine and metformin    Stay the course. Continue working with health  and dietitian.      -We reviewed her medications and whether associated with weight gain.    Current Outpatient Medications:      acetaminophen (TYLENOL) 325 MG tablet, Take 3 tablets (975 mg) by mouth every 8 hours As scheduled for the next 7-10 days, then decrease both dose & frequency to as needed there after., Disp: 90 tablet, Rfl: 0     Alcohol Swabs (ALCOHOL PREP) 70 % PADS, 1 applicator 3 times daily, Disp: 100 each, Rfl: 0     amLODIPine (NORVASC) 5 MG tablet, Take 1 tablet (5 mg) by mouth daily, Disp: 90 tablet, Rfl: 3     blood glucose (NO BRAND SPECIFIED) lancets standard, Use to test blood sugar 3 times daily or as directed., Disp: 1 each, Rfl: 0     blood glucose (NO BRAND SPECIFIED) test strip, Use to test blood sugar 3 times daily or as directed., Disp: 200 strip, Rfl: 0     Calcium Carb-Cholecalciferol 600-800 MG-UNIT TABS, Take 800 mg by  mouth every morning (before breakfast), Disp: 90 tablet, Rfl: 3     calcium carbonate (TUMS) 500 MG chewable tablet, Take 1 tablet (500 mg) by mouth 3 times daily as needed for heartburn, Disp: 30 tablet, Rfl: 0     cyanocobalamin (VITAMIN B-12) 1000 MCG tablet, Take 1 tablet (1,000 mcg) by mouth daily, Disp: , Rfl:      gabapentin (NEURONTIN) 600 MG tablet, Take 1 tablet (600 mg) by mouth 4 times daily, Disp: 120 tablet, Rfl: 0     hydrOXYzine (ATARAX) 25 MG tablet, Take 1 tablet (25 mg) by mouth every 6 hours as needed for anxiety, Disp: 40 tablet, Rfl: 0     insulin glargine (LANTUS PEN) 100 UNIT/ML pen, Inject 4 Units Subcutaneous At Bedtime, Disp:  , Rfl:      insulin lispro (HUMALOG KWIKPEN) 100 UNIT/ML (1 unit dial) KWIKPEN, Inject 1-7 Units Subcutaneous 4 times daily (with meals and nightly) Per the insulin sliding scale provided in the discharge summary., Disp: 3 mL, Rfl: 2     metFORMIN (GLUCOPHAGE) 500 MG tablet, Take 2 tablets (1,000 mg) by mouth 2 times daily (with meals), Disp: 360 tablet, Rfl: 3     Ostomy Supplies MISC, 20 each daily, Disp: 20 each, Rfl: 11     pantoprazole (PROTONIX) 40 MG EC tablet, Take 1 tablet (40 mg) by mouth every morning (before breakfast), Disp: 90 tablet, Rfl: 3     phentermine (ADIPEX-P) 37.5 MG tablet, Take 0.5-1 tablets (18.75-37.5 mg) by mouth every morning (before breakfast), Disp: 90 tablet, Rfl: 0     predniSONE (DELTASONE) 20 MG tablet, Take 20 mg by mouth daily 30 mg daily, Disp:  , Rfl:      rosuvastatin (CRESTOR) 10 MG tablet, Take 1 tablet (10 mg) by mouth daily, Disp: 90 tablet, Rfl: 3     simethicone (MYLICON) 80 MG chewable tablet, Take 1-2 tablets ( mg) by mouth 4 times daily as needed for cramping, Disp: 120 tablet, Rfl: 0     triamcinolone (KENALOG) 0.1 % paste, 1 Application by Other route, Disp: , Rfl:      venlafaxine (EFFEXOR) 75 MG tablet, Take 1 tablet (75 mg) by mouth 3 times daily, Disp: 90 tablet, Rfl: 5     VITAMIN D3 25 MCG (1000 UT)  tablet, TAKE THREE TABLETS BY MOUTH ONCE DAILY, Disp: 300 tablet, Rfl: 1     VITRON-C  MG TABS tablet, Take 1 tablet by mouth daily, Disp: 30 tablet, Rfl: 1  No current facility-administered medications for this visit.    Facility-Administered Medications Ordered in Other Visits:      lidocaine 1 % 9 mL, 9 mL, Intradermal, Once, Anna Naidu MD     sodium bicarbonate 8.4 % injection 1 mEq, 1 mEq, Intradermal, Once, Anna Naidu MD      We discussed [facility] Bariatric Basics including:  -eating 3 meals daily  -eating protein first  -eating slowly, chewing food well  -avoiding/limiting calorie containing beverages  -choosing wheat, not white with breads, crackers, pastas, kvng, bagels, tortillas, rice  -limiting restaurant or cafeteria eating to twice a week or less  -We discussed the importance of restorative sleep and stress management in maintaining a healthy weight.  -We discussed insulin resistance and glycemic index as it relates to appetite and weight control  -We discussed the National Weight Control Registry healthy weight maintenance strategies and ways to optimize metabolism.  -We discussed the importance of physical activity including cardiovascular and strength training in maintaining a healthier weight and explored viable options.    Most recent labs:  Lab Results   Component Value Date    WBC 12.9 (H) 08/15/2021    HGB 12.6 08/15/2021    HCT 38.2 08/15/2021    MCV 91 08/15/2021     08/15/2021     Lab Results   Component Value Date    CHOL 207 (H) 04/05/2021     Lab Results   Component Value Date    HDL 40 (L) 04/05/2021       Lab Results   Component Value Date    TRIG 244 (H) 04/05/2021     Lab Results   Component Value Date    ALT 28 08/15/2021    AST 17 08/15/2021    ALKPHOS 73 08/15/2021       Lab Results   Component Value Date    TSH 2.5 06/09/2020         DIETARY HISTORY  Meals Per Day: 3  Eating Protein First?: trying  Food Diary: B:yogurtand toast WW lennie PB, liz L:tuna  "or WW D:out to eat for daughter's orientation Mini 9\" chicken ranch Pizza at [employer]  Snacks Per Day: beef sticks from the  or cheese  Typical Snack: see above, Otto Bars  Fluid Intake: intentional  Portion Control: problematic  Calorie Containing Beverages: no  Alcohol per week: no  Typical Protein Food Choices: variety  Choosing Whole Grains: yes  Grocery Shopping is done by: herself  Food Preparation is done by: herself  Meals at Restaurant/Cafeteria/Take Out Per Week: <2/wk  Eating at the Table:   TV is Off During Meals:     Positive Changes Since Last Visit: Maintains a 3# weight loss despite restarting prednisone  Struggling With: frustration with prednisone driving appetite and water retention    Knowledgeable in Reading Food Labels: Discussed 10/10 rule and FOODUCATE ascencion  Getting Adequate Protein: yes  Sleeping 7-8 hours/day not while on Prednisone  Stress management shopping    PHYSICAL ACTIVITY PATTERNS:  Cardiovascular: walking 3,000 steps a day  Strength Training: none    REVIEW OF SYSTEMS  GENERAL/CONSTITUTIONAL:  Fatigue: yes  CARDIOVASCULAR:  Chest Pain with Exertion: no  PULMONARY:  Dyspnea on exertion: yes  CPAP Use:   Tobacco Use: no  GASTROINTESTINAL:  GERD/Heartburn: ye  UROLOGIC:  Urinary Symptoms: no  NEUROLOGIC:  Headaches: yes  Paresthesias: no  PSYCHIATRIC:  Moods: frustrated  MUSCULOSKELETAL/RHEUMATOLOGIC  Arthralgias: yes  Myalgias: yes  ENDOCRINE:  Monitoring Blood Sugars: no  Sugars Well Controlled: unsure. Last A1c was 7.5  DERMATOLOGIC:  Rashes: plethora    PHYSICAL EXAM: (most recent vitals and today's stated weight)  Vitals: LMP 08/09/2021       GEN: Pleasant and in no acute distress  PSYCH: A&OX3,     I have reviewed the note as documented above.  This accurately captures the substance of my conversation with the patient.  Thank you for the opportunity to participate in the care of your patient.    Theresa Lindsey MD, FAAFP  [facility]  Diplomate, " American Board of Obesity Medicine    Total time spent on the date of this encounter doing: chart review, review of test results, patient visit, physical exam, education, counseling, developing plan of care, and documenting = 40 minutes.          Video-Visit Details    Type of service:  Video Visit    Video End Time (time video stopped): 12:17  Originating Location (pt. Location): Home    Distant Location (provider location):  [facility] SURGERY [facility] AND BARIATRICS [facility]     Platform used for Video Visit: AmWell      Again, thank you for allowing me to participate in the care of your patient.        Sincerely,        Theresa Lindsey MD

## 2021-09-07 ENCOUNTER — TELEPHONE (OUTPATIENT)
Dept: FAMILY MEDICINE | Facility: CLINIC | Age: 46
End: 2021-09-07

## 2021-09-07 NOTE — TELEPHONE ENCOUNTER
Call attempt x2 for health and wellness coaching appt #3 as part of the 24-week Healthy Lifestyle Plan.    Sent email and Hippflowhart message to offer assistance in getting the appt rescheduled.    May call the main scheduling line for assistance as well:  [phone].    YUMIKO Nelson, [facility]  National Board Certified Health &   24 Week Healthy Lifestyle Program Health   [place] [facility]  [phone]  email:  [email or web address]

## 2021-09-14 ENCOUNTER — OFFICE VISIT (OUTPATIENT)
Dept: DERMATOLOGY | Facility: CLINIC | Age: 46
End: 2021-09-14
Payer: COMMERCIAL

## 2021-09-14 VITALS — SYSTOLIC BLOOD PRESSURE: 120 MMHG | DIASTOLIC BLOOD PRESSURE: 82 MMHG | HEART RATE: 103 BPM | OXYGEN SATURATION: 96 %

## 2021-09-14 DIAGNOSIS — Z85.820 HISTORY OF MELANOMA: Primary | ICD-10-CM

## 2021-09-14 DIAGNOSIS — Z85.828 HISTORY OF SKIN CANCER: ICD-10-CM

## 2021-09-14 DIAGNOSIS — L82.1 SEBORRHEIC KERATOSIS: ICD-10-CM

## 2021-09-14 DIAGNOSIS — D18.01 ANGIOMA OF SKIN: ICD-10-CM

## 2021-09-14 DIAGNOSIS — D23.9 DERMAL NEVUS: ICD-10-CM

## 2021-09-14 DIAGNOSIS — D48.5 NEOPLASM OF UNCERTAIN BEHAVIOR OF SKIN: ICD-10-CM

## 2021-09-14 DIAGNOSIS — L81.4 LENTIGO: ICD-10-CM

## 2021-09-14 PROCEDURE — 99213 OFFICE O/P EST LOW 20 MIN: CPT | Mod: 25 | Performed by: DERMATOLOGY

## 2021-09-14 PROCEDURE — 11102 TANGNTL BX SKIN SINGLE LES: CPT | Performed by: DERMATOLOGY

## 2021-09-14 PROCEDURE — 88305 TISSUE EXAM BY PATHOLOGIST: CPT | Performed by: DERMATOLOGY

## 2021-09-14 NOTE — PATIENT INSTRUCTIONS
Wound Care Instructions     FOR SUPERFICIAL WOUNDS     [facility] [phone]  [facility] [phone]               BACK    AFTER 24 HOURS YOU SHOULD REMOVE THE BANDAGE AND BEGIN DAILY DRESSING CHANGES AS FOLLOWS:     1) Remove Dressing.     2) Clean and dry the area with tap water using a Q-tip or sterile gauze pad.     3) Apply Vaseline, Aquaphor, Polysporin ointment or Bacitracin ointment over entire wound.  Do NOT use Neosporin ointment.     4) Cover the wound with a band-aid, or a sterile non-stick gauze pad and micropore paper tape      REPEAT THESE INSTRUCTIONS AT LEAST ONCE A DAY UNTIL THE WOUND HAS COMPLETELY HEALED.    It is an old wives tale that a wound heals better when it is exposed to air and allowed to dry out. The wound will heal faster with a better cosmetic result if it is kept moist with ointment and covered with a bandage.    **Do not let the wound dry out.**    Supplies Needed:      *Cotton tipped applicators (Q-tips)    *Polysporin Ointment or Bacitracin Ointment (NOT NEOSPORIN)    *Band-aids or non-stick gauze pads and micropore paper tape.    PATIENT INFORMATION:    During the healing process you will notice a number of changes. All wounds develop a small halo of redness surrounding the wound.  This means healing is occurring. Severe itching with extensive redness usually indicates sensitivity to the ointment or bandage tape used to dress the wound.  You should call our office if this develops.      Swelling  and/or discoloration around your surgical site is common, particularly when performed around the eye.    All wounds normally drain.  The larger the wound the more drainage there will be.  After 7-10 days, you will notice the wound beginning to shrink and new skin will begin to grow.  The wound is healed when you can see skin has formed over the entire area.  A healed wound has a healthy, shiny look to the surface and is red to dark pink in color to  normalize.  Wounds may take approximately 4-6 weeks to heal.  Larger wounds may take 6-8 weeks.  After the wound is healed you may discontinue dressing changes.    You may experience a sensation of tightness as your wound heals. This is normal and will gradually subside.    Your healed wound may be sensitive to temperature changes. This sensitivity improves with time, but if you re having a lot of discomfort, try to avoid temperature extremes.    Patients frequently experience itching after their wound appears to have healed because of the continue healing under the skin.  Plain Vaseline will help relieve the itching.      POSSIBLE COMPLICATIONS    BLEEDIN. Leave the bandage in place.  2. Use tightly rolled up gauze or a cloth to apply direct pressure over the bandage for 30  minutes.  3. Reapply pressure for an additional 30 minutes if necessary  4. Use additional gauze and tape to maintain pressure once the bleeding has stopped.

## 2021-09-14 NOTE — LETTER
9/14/2021         RE: Doretha Fernandez  [address]  [facility] [address]        Dear Colleague,    Thank you for referring your patient, Doretha Fernandez, to the [facility]. Please see a copy of my visit note below.    Doretha Fernandez is an extremely pleasant 46 year old year old female patient here today for f/u h xof melanoma unknown primary.  She went to Dr. Cool for node dissection.1 out of 20 nodes positive.  No adjuvant Radiation.  of brain and PET scan both clear.  Was on  Nivolumab got 8 injection but got colitis.  She went to [facility].   She was treated with high dose steroids for colitis and had a dx of bacterial pneumonia.   She was admitted for to hospital for this. She is followed by GI She is off of melanoma meds.  She was also dx with seronegative rheumatoid arthritis and was on humira. Scans havebeen clear. She is 4 years out!!!.  She notes changing mole on back.  Recently had colon resection given colitis and arthritis still persists.  On prednisone.  May be starting rituxan.  Associated symptoms: none.  Patient has no other skin complaints today.  Remainder of the HPI, Meds, PMH, Allergies, FH, and SH was reviewed in chart.    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      Anxiety      Basal cell carcinoma      Cervical high risk HPV (human papillomavirus) test positive 12/13/2019    See problem list     Colitis      Depression      Diabetes mellitus, iatrogenic (H) 1/28/2020     Esophageal reflux      Inflammatory arthritis      Insomnia      Intestinal giardiasis 3/5/2018     Lumbago     left lower back pain     Lymphedema      Malignant melanoma (H)      Melanoma (H) 10/23/2017     Migraines      Mild persistent asthma      Morbid obesity with BMI of 40.0-44.9, adult (H)      STORMY (obstructive sleep apnea)      Prothrombin deficiency (H)     takes 81mg asa daily     Stroke (cerebrum) (H) 2004     During      TIA (transient ischemic attack)      Type 2 diabetes mellitus (H)        Past Surgical History:   Procedure Laterality Date     APPENDECTOMY       COLONOSCOPY N/A 10/18/2017    Procedure: COLONOSCOPY;  Colon;  Surgeon: Debbie Stephens MD;  [facility]     COLONOSCOPY N/A 3/9/2018    Procedure: COMBINED COLONOSCOPY, SINGLE OR MULTIPLE BIOPSY/POLYPECTOMY BY BIOPSY;  colon;  Surgeon: Benita Schumacher MD;  Location: [facility] GI     COLONOSCOPY      multiple since  to present - about 6 total     DISSECT LYMPH NODE AXILLA Left 10/23/2017    Procedure: DISSECT LYMPH NODE AXILLA;  Left Axillary Lymph Node Dissection ;  Surgeon: Laurent Cool MD;  Location: [facility]     EXAM UNDER ANESTHESIA PELVIC N/A 2020    Procedure: EXAM UNDER ANESTHESIA, PELVIS; with Cervical Biopsies, Vaginal Biopsy and Endocervical Curettings;  Surgeon: Melina Jung MD;  Location: [facility] OR     GYN SURGERY  ,          LAPAROSCOPIC ASSISTED COLECTOMY N/A 6/15/2021    Procedure: laparoscopic total abdominal colectomy, end ileostomy;  Surgeon: Quinton Ram MD;  Location: [facility] OR     REPAIR MOHS Left 2017    Procedure: REPAIR MOHS;  Left Upper Lid Moh's Reconstruction;  Surgeon: Kisha Bosch MD;  Location: [facility]        Family History   Problem Relation Age of Onset     Cancer Mother 45        lung     Neurologic Disorder Mother         epilepsy     Lipids Father      Gastrointestinal Disease Father         diverticulitis      Depression Father      Colitis Father      Colon Cancer Father      Diverticulitis Father      Cancer Maternal Grandmother      Blood Disease Maternal Grandmother         lymphoma      Arthritis Maternal Grandmother      Diabetes Maternal Grandmother      Depression Maternal Grandmother      Macular Degeneration Maternal Grandmother      Glaucoma Maternal Grandmother      Diabetes Maternal Grandfather      Cerebrovascular Disease Maternal Grandfather      Blood  Disease Maternal Grandfather      Heart Disease Maternal Grandfather      Glaucoma Maternal Grandfather      Cancer Paternal Grandmother      Cancer - colorectal Paternal Grandmother      Colitis Paternal Grandmother      Colon Cancer Paternal Grandmother      Diverticulitis Paternal Grandmother      Respiratory Paternal Grandfather         emphysema      Colitis Paternal Grandfather      Colon Cancer Paternal Grandfather      Diverticulitis Paternal Grandfather      Heart Disease Daughter      Asthma Daughter      Depression Sister      Melanoma No family hx of        Social History     Socioeconomic History     Marital status:      Spouse name: Not on file     Number of children: Not on file     Years of education: Not on file     Highest education level: Not on file   Occupational History     Not on file   Tobacco Use     Smoking status: Former Smoker     Packs/day: 1.00     Years: 5.00     Pack years: 5.00     Quit date: 3/20/1998     Years since quittin.5     Smokeless tobacco: Never Used   Vaping Use     Vaping Use: Never used   Substance and Sexual Activity     Alcohol use: Yes     Comment: occ     Drug use: No     Sexual activity: Not Currently     Partners: Male     Birth control/protection: Surgical   Other Topics Concern     Parent/sibling w/ CABG, MI or angioplasty before 65F 55M? No   Social History Narrative    20 y.o- patient's mother   of lung cancer. She had to take care of her younger sister.    2012- patient's  had a heart attack with stents placed, followed by cardiac rehabilitation    2000 TO 2012  was in [facility] for depression    2013 patient's  went through alcohol rehabilitation at [facility] inpatient            They have attended couple counseling a couple of times and patient went to the family program for chemical dependency.    Patient denies alcohol or drug use and herself            Has 2  "children, girls ages 17 and 14. Oldest has been a \"trouble maker.\"     For a while she was working 3 jobs since her  was ill. Works at the [employer] for [employer]. Reports her job is very stressful.         Social Determinants of Health     Financial Resource Strain: High Risk     Difficulty of Paying Living Expenses: Very hard   Food Insecurity: No Food Insecurity     Worried About Running Out of Food in the Last Year: Never true     Ran Out of Food in the Last Year: Never true   Transportation Needs: No Transportation Needs     Lack of Transportation (Medical): No     Lack of Transportation (Non-Medical): No   Physical Activity:      Days of Exercise per Week:      Minutes of Exercise per Session:    Stress: Stress Concern Present     Feeling of Stress : Very much   Social Connections: Unknown     Frequency of Communication with Friends and Family: Not asked     Frequency of Social Gatherings with Friends and Family: Not asked     Attends Bahai Services: Not asked     Active Member of Clubs or Organizations: Not asked     Attends Club or Organization Meetings: Not asked     Marital Status:    Intimate Partner Violence: Unknown     Fear of Current or Ex-Partner: Not asked     Emotionally Abused: Not asked     Physically Abused: Not asked     Sexually Abused: Not asked       Outpatient Encounter Medications as of 9/14/2021   Medication Sig Dispense Refill     acetaminophen (TYLENOL) 325 MG tablet Take 3 tablets (975 mg) by mouth every 8 hours As scheduled for the next 7-10 days, then decrease both dose & frequency to as needed there after. 90 tablet 0     Alcohol Swabs (ALCOHOL PREP) 70 % PADS 1 applicator 3 times daily 100 each 0     amLODIPine (NORVASC) 5 MG tablet Take 1 tablet (5 mg) by mouth daily 90 tablet 3     blood glucose (NO BRAND SPECIFIED) lancets standard Use to test blood sugar 3 times daily or as directed. 1 each 0     blood glucose (NO BRAND SPECIFIED) test strip " Use to test blood sugar 3 times daily or as directed. 200 strip 0     Calcium Carb-Cholecalciferol 600-800 MG-UNIT TABS Take 800 mg by mouth every morning (before breakfast) 90 tablet 3     calcium carbonate (TUMS) 500 MG chewable tablet Take 1 tablet (500 mg) by mouth 3 times daily as needed for heartburn 30 tablet 0     cyanocobalamin (VITAMIN B-12) 1000 MCG tablet Take 1 tablet (1,000 mcg) by mouth daily       gabapentin (NEURONTIN) 600 MG tablet Take 1 tablet (600 mg) by mouth 4 times daily 120 tablet 0     hydrOXYzine (ATARAX) 25 MG tablet Take 1 tablet (25 mg) by mouth every 6 hours as needed for anxiety 40 tablet 0     insulin glargine (LANTUS PEN) 100 UNIT/ML pen Inject 4 Units Subcutaneous At Bedtime       insulin lispro (HUMALOG KWIKPEN) 100 UNIT/ML (1 unit dial) KWIKPEN Inject 1-7 Units Subcutaneous 4 times daily (with meals and nightly) Per the insulin sliding scale provided in the discharge summary. 3 mL 2     metFORMIN (GLUCOPHAGE) 500 MG tablet Take 2 tablets (1,000 mg) by mouth 2 times daily (with meals) 360 tablet 3     Ostomy Supplies MISC 20 each daily 20 each 11     pantoprazole (PROTONIX) 40 MG EC tablet Take 1 tablet (40 mg) by mouth every morning (before breakfast) 90 tablet 3     phentermine (ADIPEX-P) 37.5 MG tablet Take 0.5-1 tablets (18.75-37.5 mg) by mouth every morning (before breakfast) 90 tablet 0     predniSONE (DELTASONE) 20 MG tablet Take 20 mg by mouth daily 30 mg daily       rosuvastatin (CRESTOR) 10 MG tablet Take 1 tablet (10 mg) by mouth daily 90 tablet 3     simethicone (MYLICON) 80 MG chewable tablet Take 1-2 tablets ( mg) by mouth 4 times daily as needed for cramping 120 tablet 0     triamcinolone (KENALOG) 0.1 % paste 1 Application by Other route       venlafaxine (EFFEXOR) 75 MG tablet Take 1 tablet (75 mg) by mouth 3 times daily 90 tablet 5     VITAMIN D3 25 MCG (1000 UT) tablet TAKE THREE TABLETS BY MOUTH ONCE DAILY 300 tablet 1     VITRON-C  MG TABS tablet  Take 1 tablet by mouth daily 30 tablet 1     Facility-Administered Encounter Medications as of 9/14/2021   Medication Dose Route Frequency Provider Last Rate Last Admin     lidocaine 1 % 9 mL  9 mL Intradermal Once Anna Naidu MD         sodium bicarbonate 8.4 % injection 1 mEq  1 mEq Intradermal Once Anna Naidu MD                 O:   NAD, WDWN, Alert & Oriented, Mood & Affect wnl, Vitals stable   Here today alone   /82 (BP Location: Right arm, Patient Position: Right side, Cuff Size: Adult Large)   Pulse 103   SpO2 96%    General appearance normal   Vitals stable   Alert, oriented and in no acute distress      Following lymph nodes palpated: Occipital, Cervical, Supraclavicular , inguinal and femoral no lad   Pink papules on trunk  Mid upper back 6mm pigmented macule  L     Stuck on papules and brown macules on trunk and ext   Red papules on trunk  Flesh colored papules on trunk     The remainder of the full exam was normal; the following areas were examined:  conjunctiva/lids, oral mucosa, neck, peripheral vascular system, abdomen, lymph nodes, digits/nails, eccrine and apocrine glands, scalp/hair, face, neck, chest, abdomen, buttocks, back, RUE, LUE, RLE, LLE       Eyes: Conjunctivae/lids:Normal     ENT: Lips, buccal mucosa, tongue: normal    MSK:Normal    Cardiovascular: peripheral edema none    Pulm: Breathing Normal    Lymph Nodes: No Head and Neck Lymphadenopathy     Neuro/Psych: Orientation:Alert and Orientedx3 ; Mood/Affect:normal       A/P:  1. Seborrheic keratosis, lentigo, angioma, dermal nevus, hx of n,sc hx of metastatic melanoma  MELANOMA DISCUSSED WITH PATIENT:  I discussed the specifics of tumor, prognosis, metachronous melanoma, self exam, and genetics with the patient. I explained the need for monthly skin exams including and taught the patient how to do this. Patient was asked about new or changing moles . I discussed with patient signs and symptoms that could arise in the  setting of recurrent locoregional or metastatic disease. In addition, the need to undergo every 6 month dermatologic full skin survey and evaluation given that patients with a diagnosis of melanoma are at risk of recurrence (local and distant) and of subsequent de bhumi melanoma  2. Mid back r/o atypical nevus  TANGENTIAL BIOPSY SENT OUT:  After consent, anesthesia with LEC and prep, tangential excision performed and specimen sent out for permanent section histology.  No complications and routine wound care. Patient told to call our office in 1-2 weeks for result.         It was a pleasure speaking to Doretha Fernandez today.  Previous clinic notes and pertinent laboratory tests were reviewed prior to Doretha Fernandez's visit.  Nature and genetics of benign skin lesions dicussed with patient.  Signs and Symptoms of skin cancer discussed with patient.  Patient encouraged to perform monthly skin exams.  UV precautions reviewed with patient.  Return to clinic 6 months        Again, thank you for allowing me to participate in the care of your patient.        Sincerely,        Lowell Bullock MD

## 2021-09-14 NOTE — PROGRESS NOTES
Doretha Fernandez is an extremely pleasant 46 year old year old female patient here today for f/u h xof melanoma unknown primary.  She went to Dr. Cool for node dissection.1 out of 20 nodes positive.  No adjuvant Radiation.  of brain and PET scan both clear.  Was on  Nivolumab got 8 injection but got colitis.  She went to [place].   She was treated with high dose steroids for colitis and had a dx of bacterial pneumonia.   She was admitted for to hospital for this. She is followed by GI She is off of melanoma meds.  She was also dx with seronegative rheumatoid arthritis and was on humira. Scans havebeen clear. She is 4 years out!!!.  She notes changing mole on back.  Recently had colon resection given colitis and arthritis still persists.  On prednisone.  May be starting rituxan.  Associated symptoms: none.  Patient has no other skin complaints today.  Remainder of the HPI, Meds, PMH, Allergies, FH, and SH was reviewed in chart.    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      Anxiety      Basal cell carcinoma      Cervical high risk HPV (human papillomavirus) test positive 2019    See problem list     Colitis      Depression      Diabetes mellitus, iatrogenic (H) 2020     Esophageal reflux      Inflammatory arthritis      Insomnia      Intestinal giardiasis 3/5/2018     Lumbago     left lower back pain     Lymphedema      Malignant melanoma (H)      Melanoma (H) 10/23/2017     Migraines      Mild persistent asthma      Morbid obesity with BMI of 40.0-44.9, adult (H)      STORMY (obstructive sleep apnea)      Prothrombin deficiency (H)     takes 81mg asa daily     Stroke (cerebrum) (H)     During      TIA (transient ischemic attack)      Type 2 diabetes mellitus (H)        Past Surgical History:   Procedure Laterality Date     APPENDECTOMY  2004     COLONOSCOPY N/A 10/18/2017    Procedure: COLONOSCOPY;  Colon;  Surgeon:  Debbie Stephens MD;  [facility]     COLONOSCOPY N/A 3/9/2018    Procedure: COMBINED COLONOSCOPY, SINGLE OR MULTIPLE BIOPSY/POLYPECTOMY BY BIOPSY;  colon;  Surgeon: Benita Schumacher MD;  Location: [facility]     COLONOSCOPY      multiple since  to present - about 6 total     DISSECT LYMPH NODE AXILLA Left 10/23/2017    Procedure: DISSECT LYMPH NODE AXILLA;  Left Axillary Lymph Node Dissection ;  Surgeon: Laurent Cool MD;  [facility]     EXAM UNDER ANESTHESIA PELVIC N/A 2020    Procedure: EXAM UNDER ANESTHESIA, PELVIS; with Cervical Biopsies, Vaginal Biopsy and Endocervical Curettings;  Surgeon: Melina Jung MD;  Location: [facility]     GYN SURGERY  ,          LAPAROSCOPIC ASSISTED COLECTOMY N/A 6/15/2021    Procedure: laparoscopic total abdominal colectomy, end ileostomy;  Surgeon: Quinton Ram MD;  Location: [facility]     REPAIR MOHS Left 2017    Procedure: REPAIR MOHS;  Left Upper Lid Moh's Reconstruction;  Surgeon: Kisha Bosch MD;  [facility]        Family History   Problem Relation Age of Onset     Cancer Mother 45        lung     Neurologic Disorder Mother         epilepsy     Lipids Father      Gastrointestinal Disease Father         diverticulitis      Depression Father      Colitis Father      Colon Cancer Father      Diverticulitis Father      Cancer Maternal Grandmother      Blood Disease Maternal Grandmother         lymphoma      Arthritis Maternal Grandmother      Diabetes Maternal Grandmother      Depression Maternal Grandmother      Macular Degeneration Maternal Grandmother      Glaucoma Maternal Grandmother      Diabetes Maternal Grandfather      Cerebrovascular Disease Maternal Grandfather      Blood Disease Maternal Grandfather      Heart Disease Maternal Grandfather      Glaucoma Maternal Grandfather      Cancer Paternal Grandmother      Cancer - colorectal Paternal Grandmother      Colitis Paternal Grandmother      Colon Cancer Paternal  "Grandmother      Diverticulitis Paternal Grandmother      Respiratory Paternal Grandfather         emphysema      Colitis Paternal Grandfather      Colon Cancer Paternal Grandfather      Diverticulitis Paternal Grandfather      Heart Disease Daughter      Asthma Daughter      Depression Sister      Melanoma No family hx of        Social History     Socioeconomic History     Marital status:      Spouse name: Not on file     Number of children: Not on file     Years of education: Not on file     Highest education level: Not on file   Occupational History     Not on file   Tobacco Use     Smoking status: Former Smoker     Packs/day: 1.00     Years: 5.00     Pack years: 5.00     Quit date: 3/20/1998     Years since quittin.5     Smokeless tobacco: Never Used   Vaping Use     Vaping Use: Never used   Substance and Sexual Activity     Alcohol use: Yes     Comment: occ     Drug use: No     Sexual activity: Not Currently     Partners: Male     Birth control/protection: Surgical   Other Topics Concern     Parent/sibling w/ CABG, MI or angioplasty before 65F 55M? No   Social History Narrative    20 y.o- patient's mother   of lung cancer. She had to take care of her younger sister.    2012- patient's  had a heart attack with stents placed, followed by cardiac rehabilitation    2000 TO 2012  was in [facility]    2013 patient's  went through alcohol rehabilitation at [place] inpatient            They have attended couple counseling a couple of times and patient went to the family program for chemical dependency.    Patient denies alcohol or drug use and herself            Has 2 children, girls ages 17 and 14. Oldest has been a \"trouble maker.\"     For a while she was working 3 jobs since her  was ill. Works at the [employer] for [employer]. Reports her job is very stressful.         Social " Determinants of Health     Financial Resource Strain: High Risk     Difficulty of Paying Living Expenses: Very hard   Food Insecurity: No Food Insecurity     Worried About Running Out of Food in the Last Year: Never true     Ran Out of Food in the Last Year: Never true   Transportation Needs: No Transportation Needs     Lack of Transportation (Medical): No     Lack of Transportation (Non-Medical): No   Physical Activity:      Days of Exercise per Week:      Minutes of Exercise per Session:    Stress: Stress Concern Present     Feeling of Stress : Very much   Social Connections: Unknown     Frequency of Communication with Friends and Family: Not asked     Frequency of Social Gatherings with Friends and Family: Not asked     Attends Buddhist Services: Not asked     Active Member of Clubs or Organizations: Not asked     Attends Club or Organization Meetings: Not asked     Marital Status:    Intimate Partner Violence: Unknown     Fear of Current or Ex-Partner: Not asked     Emotionally Abused: Not asked     Physically Abused: Not asked     Sexually Abused: Not asked       Outpatient Encounter Medications as of 9/14/2021   Medication Sig Dispense Refill     acetaminophen (TYLENOL) 325 MG tablet Take 3 tablets (975 mg) by mouth every 8 hours As scheduled for the next 7-10 days, then decrease both dose & frequency to as needed there after. 90 tablet 0     Alcohol Swabs (ALCOHOL PREP) 70 % PADS 1 applicator 3 times daily 100 each 0     amLODIPine (NORVASC) 5 MG tablet Take 1 tablet (5 mg) by mouth daily 90 tablet 3     blood glucose (NO BRAND SPECIFIED) lancets standard Use to test blood sugar 3 times daily or as directed. 1 each 0     blood glucose (NO BRAND SPECIFIED) test strip Use to test blood sugar 3 times daily or as directed. 200 strip 0     Calcium Carb-Cholecalciferol 600-800 MG-UNIT TABS Take 800 mg by mouth every morning (before breakfast) 90 tablet 3     calcium carbonate (TUMS) 500 MG chewable tablet  Take 1 tablet (500 mg) by mouth 3 times daily as needed for heartburn 30 tablet 0     cyanocobalamin (VITAMIN B-12) 1000 MCG tablet Take 1 tablet (1,000 mcg) by mouth daily       gabapentin (NEURONTIN) 600 MG tablet Take 1 tablet (600 mg) by mouth 4 times daily 120 tablet 0     hydrOXYzine (ATARAX) 25 MG tablet Take 1 tablet (25 mg) by mouth every 6 hours as needed for anxiety 40 tablet 0     insulin glargine (LANTUS PEN) 100 UNIT/ML pen Inject 4 Units Subcutaneous At Bedtime       insulin lispro (HUMALOG KWIKPEN) 100 UNIT/ML (1 unit dial) KWIKPEN Inject 1-7 Units Subcutaneous 4 times daily (with meals and nightly) Per the insulin sliding scale provided in the discharge summary. 3 mL 2     metFORMIN (GLUCOPHAGE) 500 MG tablet Take 2 tablets (1,000 mg) by mouth 2 times daily (with meals) 360 tablet 3     Ostomy Supplies MISC 20 each daily 20 each 11     pantoprazole (PROTONIX) 40 MG EC tablet Take 1 tablet (40 mg) by mouth every morning (before breakfast) 90 tablet 3     phentermine (ADIPEX-P) 37.5 MG tablet Take 0.5-1 tablets (18.75-37.5 mg) by mouth every morning (before breakfast) 90 tablet 0     predniSONE (DELTASONE) 20 MG tablet Take 20 mg by mouth daily 30 mg daily       rosuvastatin (CRESTOR) 10 MG tablet Take 1 tablet (10 mg) by mouth daily 90 tablet 3     simethicone (MYLICON) 80 MG chewable tablet Take 1-2 tablets ( mg) by mouth 4 times daily as needed for cramping 120 tablet 0     triamcinolone (KENALOG) 0.1 % paste 1 Application by Other route       venlafaxine (EFFEXOR) 75 MG tablet Take 1 tablet (75 mg) by mouth 3 times daily 90 tablet 5     VITAMIN D3 25 MCG (1000 UT) tablet TAKE THREE TABLETS BY MOUTH ONCE DAILY 300 tablet 1     VITRON-C  MG TABS tablet Take 1 tablet by mouth daily 30 tablet 1     Facility-Administered Encounter Medications as of 9/14/2021   Medication Dose Route Frequency Provider Last Rate Last Admin     lidocaine 1 % 9 mL  9 mL Intradermal Once Anna Naidu MD          sodium bicarbonate 8.4 % injection 1 mEq  1 mEq Intradermal Once Anna Naidu MD                 O:   NAD, WDWN, Alert & Oriented, Mood & Affect wnl, Vitals stable   Here today alone   /82 (BP Location: Right arm, Patient Position: Right side, Cuff Size: Adult Large)   Pulse 103   SpO2 96%    General appearance normal   Vitals stable   Alert, oriented and in no acute distress      Following lymph nodes palpated: Occipital, Cervical, Supraclavicular , inguinal and femoral no lad   Pink papules on trunk  Mid upper back 6mm pigmented macule  L     Stuck on papules and brown macules on trunk and ext   Red papules on trunk  Flesh colored papules on trunk     The remainder of the full exam was normal; the following areas were examined:  conjunctiva/lids, oral mucosa, neck, peripheral vascular system, abdomen, lymph nodes, digits/nails, eccrine and apocrine glands, scalp/hair, face, neck, chest, abdomen, buttocks, back, RUE, LUE, RLE, LLE       Eyes: Conjunctivae/lids:Normal     ENT: Lips, buccal mucosa, tongue: normal    MSK:Normal    Cardiovascular: peripheral edema none    Pulm: Breathing Normal    Lymph Nodes: No Head and Neck Lymphadenopathy     Neuro/Psych: Orientation:Alert and Orientedx3 ; Mood/Affect:normal       A/P:  1. Seborrheic keratosis, lentigo, angioma, dermal nevus, hx of n,sc hx of metastatic melanoma  MELANOMA DISCUSSED WITH PATIENT:  I discussed the specifics of tumor, prognosis, metachronous melanoma, self exam, and genetics with the patient. I explained the need for monthly skin exams including and taught the patient how to do this. Patient was asked about new or changing moles . I discussed with patient signs and symptoms that could arise in the setting of recurrent locoregional or metastatic disease. In addition, the need to undergo every 6 month dermatologic full skin survey and evaluation given that patients with a diagnosis of melanoma are at risk of recurrence (local and  distant) and of subsequent de bhumi melanoma  2. Mid back r/o atypical nevus  TANGENTIAL BIOPSY SENT OUT:  After consent, anesthesia with LEC and prep, tangential excision performed and specimen sent out for permanent section histology.  No complications and routine wound care. Patient told to call our office in 1-2 weeks for result.         It was a pleasure speaking to Doretha Fernandez today.  Previous clinic notes and pertinent laboratory tests were reviewed prior to Doretha Fernandez's visit.  Nature and genetics of benign skin lesions dicussed with patient.  Signs and Symptoms of skin cancer discussed with patient.  Patient encouraged to perform monthly skin exams.  UV precautions reviewed with patient.  Return to clinic 6 months

## 2021-09-14 NOTE — NURSING NOTE
Chief Complaint   Patient presents with     Derm Problem     skin check        Denisha David on 9/14/2021 at 2:02 PM

## 2021-09-19 ENCOUNTER — HEALTH MAINTENANCE LETTER (OUTPATIENT)
Age: 46
End: 2021-09-19

## 2021-09-20 ENCOUNTER — VIRTUAL VISIT (OUTPATIENT)
Dept: SURGERY | Facility: CLINIC | Age: 46
End: 2021-09-20
Payer: COMMERCIAL

## 2021-09-20 DIAGNOSIS — E66.01 MORBID OBESITY WITH BMI OF 40.0-44.9, ADULT (H): Primary | ICD-10-CM

## 2021-09-20 LAB
PATH REPORT.COMMENTS IMP SPEC: NORMAL
PATH REPORT.COMMENTS IMP SPEC: NORMAL
PATH REPORT.FINAL DX SPEC: NORMAL
PATH REPORT.GROSS SPEC: NORMAL
PATH REPORT.MICROSCOPIC SPEC OTHER STN: NORMAL
PATH REPORT.RELEVANT HX SPEC: NORMAL

## 2021-09-20 PROCEDURE — 97803 MED NUTRITION INDIV SUBSEQ: CPT | Mod: 95 | Performed by: DIETITIAN, REGISTERED

## 2021-09-20 NOTE — PROGRESS NOTES
Doretha Fernandez is a 46 year old who is being evaluated via a billable video visit.      How would you like to obtain your AVS? MyChart  If the video visit is dropped, the invitation should be resent by: Send to e-mail at: [email or web address]  Will anyone else be joining your video visit? No      Video Start Time: 10:30 am      Medical  Weight Loss Follow-Up Diet Evaluation  Assessment:  Doretha is presenting today for a follow up weight management nutrition consultation. Pt has had an initial appointment with Dr. Lindsey  Weight loss medication: Phentermine. Trulicity   Personal Goals: Under 200 lb to have step 2 surgery   Pt's weight is 239 lbs  No flowsheet data found.  BMI: There is no height or weight on file to calculate BMI.  Ideal body weight: 52.4 kg (115 lb 8.3 oz)  Adjusted ideal body weight: 74.8 kg (164 lb 14.6 oz)    Estimated RMR (Ind[place]-St Jeor equation):  1714 kcals x 1.2 (sedentary) = 2057 kcals (for weight maintenance)  Recommended Protein Intake: 85-95 grams of protein/day  Patient Active Problem List:  Patient Active Problem List   Diagnosis     CARDIOVASCULAR SCREENING; LDL GOAL LESS THAN 160     DUB (dysfunctional uterine bleeding)     Anxiety state     Esophageal reflux     Moderate major depression (H)     Mild intermittent asthma without complication     Vision changes     Prothrombin mutation (H)     Metastatic malignant melanoma (H)     Malignant melanoma of left upper extremity including shoulder (H)     Functional diarrhea     Colitis     Rectal bleeding     Bilateral leg cramps     Rash and nonspecific skin eruption     Other chronic pain     Immunosuppressed status (H)     Ulcerative colitis with complication, unspecified location (H)     Morbid obesity (H)     Cervical high risk HPV (human papillomavirus) test positive     STORMY (obstructive sleep apnea)     Secondary lymphedema     Chronic neutrophilia     Diabetes mellitus, iatrogenic (H)     High risk HPV infection     Abdominal pain  "    Anemia     Candidiasis of mouth     Hypocalcemia     Malaise     Menstrual irregularity     Arthritis, rheumatoid (H)     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 difficile colitis     Ulcerative colitis without complications, unspecified location (H)     Colostomy in place (H)     S/P colectomy     Polyarthralgia     Drug-induced polyneuropathy (H)     Arthralgia of both knees     Adjustment disorder with mixed emotional features     Migraines     Diabetes: iatrogenic.    Weight loss history: she has gained about 60 lbs related to high dose prednisone, recent colostomy    Progress on goals from last visit: She is frustrated with having to continue to take prednisone and her lack of progress. She states she feels defeated and like it's impossible to lose weight while on Phentermine. She was open to discussing strategies and weight loss expectations.  1. Eat protein first, plate method (incorporate 1 small portion of a new food to meals). Add only one new food at a time, avoid skins and peels.  2. Chew food well, take 20-30 minutes to eat meals -  Yes, has concluded that she cannot eat onions and garlic  3. Continue walking routine - met    Proteins she eats: chicken, tuna, salmon, PB, yogurt, Deli ham and deli turkey, piece of provolone, beef jerky.     Dietary Recall:  Breakfast: Toast with PB, banana on top  Lunch/Dinner: Turkey tenderloin wrapped in joel, carrots, cauliflower, broccoli  Beverages:   Coffee  Water: 48 oz of water    Exercise:   Doesn't walk every day. But \"goes to things\" to get out to get walking  Nutrition Diagnosis:    Overweight/Obesity (NC 3.3) related to excessive calorie intake as evidenced by BMI 42    Intervention:  1. Food and/or nutrient delivery: Portion controlled, high " protein, fruit and veggie focused  2. Nutrition counseling: Reviewed progress with goals, discussed weight loss strategies to implement, and weight loss expectations    Monitoring/Evaluation:    Goals:  1. Restart tracking, focusing on protein intake 20-30 g per meal and aiming for 3 meals per day  2. Continue walking routine    Patient to follow up bariatrician and 1 month(s) with SUAD    Video-Visit Details    Type of service:  Video Visit    Video End Time:11:07 am    Originating Location (pt. Location): Home    Distant Location (provider location):  [facility] [place]     Platform used for Video Visit: Gabriel Valdivia RD

## 2021-09-20 NOTE — LETTER
9/20/2021         RE: Doretha Fernandez  [address]  [place] [facility] [place] [facility] [address]        Dear Colleague,    Thank you for referring your patient, Doretha Fernandez, to the [facility] [place]. Please see a copy of my visit note below.    Doretha Fernandez is a 46 year old who is being evaluated via a billable video visit.      How would you like to obtain your AVS? MyChart  If the video visit is dropped, the invitation should be resent by: Send to e-mail at: [email or web address]  Will anyone else be joining your video visit? No      Video Start Time: 10:30 am      Medical  Weight Loss Follow-Up Diet Evaluation  Assessment:  Doretha is presenting today for a follow up weight management nutrition consultation. Pt has had an initial appointment with Dr. Lindsey  Weight loss medication: Phentermine. Trulicity   Personal Goals: Under 200 lb to have step 2 surgery   Pt's weight is 239 lbs  No flowsheet data found.  BMI: There is no height or weight on file to calculate BMI.  Ideal body weight: 52.4 kg (115 lb 8.3 oz)  Adjusted ideal body weight: 74.8 kg (164 lb 14.6 oz)    Estimated RMR (Bailey-St Jeor equation):  1714 kcals x 1.2 (sedentary) = 2057 kcals (for weight maintenance)  Recommended Protein Intake: 85-95 grams of protein/day  Patient Active Problem List:  Patient Active Problem List   Diagnosis     CARDIOVASCULAR SCREENING; LDL GOAL LESS THAN 160     DUB (dysfunctional uterine bleeding)     Anxiety state     Esophageal reflux     Moderate major depression (H)     Mild intermittent asthma without complication     Vision changes     Prothrombin mutation (H)     Metastatic malignant melanoma (H)     Malignant melanoma of left upper extremity including shoulder (H)     Functional diarrhea     Colitis     Rectal bleeding     Bilateral leg cramps     Rash and nonspecific skin eruption     Other chronic pain     Immunosuppressed status (H)     Ulcerative colitis with complication,  unspecified location (H)     Morbid obesity (H)     Cervical high risk HPV (human papillomavirus) test positive     STORMY (obstructive sleep apnea)     Secondary lymphedema     Chronic neutrophilia     Diabetes mellitus, iatrogenic (H)     High risk HPV infection     Abdominal pain     Anemia     Candidiasis of mouth     Hypocalcemia     Malaise     Menstrual irregularity     Arthritis, rheumatoid (H)     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 difficile colitis     Ulcerative colitis without complications, unspecified location (H)     Colostomy in place (H)     S/P colectomy     Polyarthralgia     Drug-induced polyneuropathy (H)     Arthralgia of both knees     Adjustment disorder with mixed emotional features     Migraines     Diabetes: iatrogenic.    Weight loss history: she has gained about 60 lbs related to high dose prednisone, recent colostomy    Progress on goals from last visit: She is frustrated with having to continue to take prednisone and her lack of progress. She states she feels defeated and like it's impossible to lose weight while on Phentermine. She was open to discussing strategies and weight loss expectations.  1. Eat protein first, plate method (incorporate 1 small portion of a new food to meals). Add only one new food at a time, avoid skins and peels.  2. Chew food well, take 20-30 minutes to eat meals -  Yes, has concluded that she cannot eat onions and garlic  3. Continue walking routine - met    Proteins she eats: chicken, tuna, salmon, PB, yogurt, Deli ham and deli turkey, piece of provolone, beef jerky.     Dietary Recall:  Breakfast: Toast with PB, banana on top  Lunch/Dinner: Turkey tenderloin wrapped in joel, carrots, cauliflower, broccoli  Beverages:   Coffee  Water: 48 oz of  "water    Exercise:   Doesn't walk every day. But \"goes to things\" to get out to get walking  Nutrition Diagnosis:    Overweight/Obesity (NC 3.3) related to excessive calorie intake as evidenced by BMI 42    Intervention:  1. Food and/or nutrient delivery: Portion controlled, high protein, fruit and veggie focused  2. Nutrition counseling: Reviewed progress with goals, discussed weight loss strategies to implement, and weight loss expectations    Monitoring/Evaluation:    Goals:  1. Restart tracking, focusing on protein intake 20-30 g per meal and aiming for 3 meals per day  2. Continue walking routine    Patient to follow up bariatrician and 1 month(s) with RD    Video-Visit Details    Type of service:  Video Visit    Video End Time:11:07 am    Originating Location (pt. Location): Home    Distant Location (provider location):  [facility] [place]     Platform used for Video Visit: Gabriel Valdivia RD      Again, thank you for allowing me to participate in the care of your patient.        Sincerely,        Yolette Valdivia RD    "

## 2021-09-23 ENCOUNTER — OFFICE VISIT (OUTPATIENT)
Dept: SURGERY | Facility: CLINIC | Age: 46
End: 2021-09-23
Payer: COMMERCIAL

## 2021-09-23 VITALS
OXYGEN SATURATION: 95 % | WEIGHT: 243.3 LBS | HEIGHT: 63 IN | HEART RATE: 90 BPM | DIASTOLIC BLOOD PRESSURE: 67 MMHG | SYSTOLIC BLOOD PRESSURE: 110 MMHG | BODY MASS INDEX: 43.11 KG/M2

## 2021-09-23 DIAGNOSIS — G89.29 OTHER CHRONIC PAIN: ICD-10-CM

## 2021-09-23 DIAGNOSIS — Z93.2 ILEOSTOMY STATUS (H): Primary | ICD-10-CM

## 2021-09-23 PROCEDURE — 99215 OFFICE O/P EST HI 40 MIN: CPT | Performed by: SURGERY

## 2021-09-23 RX ORDER — GABAPENTIN 600 MG/1
600 TABLET ORAL 4 TIMES DAILY
Qty: 120 TABLET | Refills: 0 | OUTPATIENT
Start: 2021-09-23

## 2021-09-23 RX ORDER — LOPERAMIDE HYDROCHLORIDE 2 MG/1
4 TABLET ORAL 4 TIMES DAILY PRN
Qty: 2 TABLET | Refills: 3 | Status: ON HOLD | OUTPATIENT
Start: 2021-09-23 | End: 2023-01-12

## 2021-09-23 RX ORDER — GABAPENTIN 600 MG/1
600 TABLET ORAL 4 TIMES DAILY
Qty: 120 TABLET | Refills: 1 | Status: SHIPPED | OUTPATIENT
Start: 2021-09-23 | End: 2021-12-23

## 2021-09-23 ASSESSMENT — PAIN SCALES - GENERAL: PAINLEVEL: SEVERE PAIN (6)

## 2021-09-23 ASSESSMENT — MIFFLIN-ST. JEOR: SCORE: 1717.73

## 2021-09-23 NOTE — PROGRESS NOTES
Colon and Rectal Surgery Clinic Note    RE: Doretha Fernandez.  : 1975.  JUNIOR: 2021.    Reason for visit: f/u UC s/p TAC w end ileostomy    HPI:     Doretha Fernandez is a 46 year old lady with UC.  On 6/15/2021 she underwent TAC with end ileostomy (laparoscopic) with me.  I last saw her 2 months ago during her first month of recovery.      Overall she is doing ok.  She is in constant pain from her arthritis which she describes as everywhere.  She has some weight while working with medical weight loss clinic and is now on phentermine.  She thinks she is 239 lbs.  She lost 15 lbs initially but thinks she has regained it since being on prednisone.  She takes 10 mg per day most of the time, but sometimes goes to 20 mg on bad days.    Her ileostomy is functioning OK.  She is not sure how much is coming out per day because she is not measuring it any more, but most days empties her bag 11 times per day.    She is eating about 1500 calories per day.  She knows certain foods cause her irritation.    Her pain is pretty severe.  It is mostly arthritis pain.  She was supposed to start rituximab for this, but then was told not to start a biologic because of risk of recurrence of melanoma.      Medical history:  Past Medical History:   Diagnosis Date     Abnormal MRI     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      Anxiety      Basal cell carcinoma      Cervical high risk HPV (human papillomavirus) test positive 2019    See problem list     Colitis      Depression      Diabetes mellitus, iatrogenic (H) 2020     Esophageal reflux      Inflammatory arthritis      Insomnia      Intestinal giardiasis 3/5/2018     Lumbago     left lower back pain     Lymphedema      Malignant melanoma (H)      Melanoma (H) 10/23/2017     Migraines      Mild persistent asthma      Morbid obesity with BMI of 40.0-44.9, adult (H)      STORMY (obstructive sleep apnea)      Prothrombin deficiency (H)     takes 81mg asa daily      Stroke (cerebrum) (H)     During      TIA (transient ischemic attack)      Type 2 diabetes mellitus (H)        Surgical history:  Past Surgical History:   Procedure Laterality Date     APPENDECTOMY       COLONOSCOPY N/A 10/18/2017    Procedure: COLONOSCOPY;  Colon;  Surgeon: Debbie Stephens MD;  [facility]     COLONOSCOPY N/A 3/9/2018    Procedure: COMBINED COLONOSCOPY, SINGLE OR MULTIPLE BIOPSY/POLYPECTOMY BY BIOPSY;  colon;  Surgeon: Benita Schumacher MD;  [facility]     COLONOSCOPY      multiple since  to present - about 6 total     DISSECT LYMPH NODE AXILLA Left 10/23/2017    Procedure: DISSECT LYMPH NODE AXILLA;  Left Axillary Lymph Node Dissection ;  Surgeon: Laurent Cool MD;  [facility]     EXAM UNDER ANESTHESIA PELVIC N/A 2020    Procedure: EXAM UNDER ANESTHESIA, PELVIS; with Cervical Biopsies, Vaginal Biopsy and Endocervical Curettings;  Surgeon: Melina Jung MD;  [facility]     GYN SURGERY  ,          LAPAROSCOPIC ASSISTED COLECTOMY N/A 6/15/2021    Procedure: laparoscopic total abdominal colectomy, end ileostomy;  Surgeon: Quinton Ram MD;  [facility]     REPAIR MOHS Left 2017    Procedure: REPAIR MOHS;  Left Upper Lid Moh's Reconstruction;  Surgeon: Kisha Bosch MD;  [facility]       Family history:  Family History   Problem Relation Age of Onset     Cancer Mother 45        lung     Neurologic Disorder Mother         epilepsy     Lipids Father      Gastrointestinal Disease Father         diverticulitis      Depression Father      Colitis Father      Colon Cancer Father      Diverticulitis Father      Cancer Maternal Grandmother      Blood Disease Maternal Grandmother         lymphoma      Arthritis Maternal Grandmother      Diabetes Maternal Grandmother      Depression Maternal Grandmother      Macular Degeneration Maternal Grandmother      Glaucoma Maternal Grandmother      Diabetes Maternal  Grandfather      Cerebrovascular Disease Maternal Grandfather      Blood Disease Maternal Grandfather      Heart Disease Maternal Grandfather      Glaucoma Maternal Grandfather      Cancer Paternal Grandmother      Cancer - colorectal Paternal Grandmother      Colitis Paternal Grandmother      Colon Cancer Paternal Grandmother      Diverticulitis Paternal Grandmother      Respiratory Paternal Grandfather         emphysema      Colitis Paternal Grandfather      Colon Cancer Paternal Grandfather      Diverticulitis Paternal Grandfather      Heart Disease Daughter      Asthma Daughter      Depression Sister      Melanoma No family hx of        Medications:  Current Outpatient Medications   Medication Sig Dispense Refill     acetaminophen (TYLENOL) 325 MG tablet Take 3 tablets (975 mg) by mouth every 8 hours As scheduled for the next 7-10 days, then decrease both dose & frequency to as needed there after. 90 tablet 0     Alcohol Swabs (ALCOHOL PREP) 70 % PADS 1 applicator 3 times daily 100 each 0     amLODIPine (NORVASC) 5 MG tablet Take 1 tablet (5 mg) by mouth daily 90 tablet 3     blood glucose (NO BRAND SPECIFIED) lancets standard Use to test blood sugar 3 times daily or as directed. 1 each 0     blood glucose (NO BRAND SPECIFIED) test strip Use to test blood sugar 3 times daily or as directed. 200 strip 0     Calcium Carb-Cholecalciferol 600-800 MG-UNIT TABS Take 800 mg by mouth every morning (before breakfast) 90 tablet 3     calcium carbonate (TUMS) 500 MG chewable tablet Take 1 tablet (500 mg) by mouth 3 times daily as needed for heartburn 30 tablet 0     cyanocobalamin (VITAMIN B-12) 1000 MCG tablet Take 1 tablet (1,000 mcg) by mouth daily       gabapentin (NEURONTIN) 600 MG tablet Take 1 tablet (600 mg) by mouth 4 times daily 120 tablet 0     hydrOXYzine (ATARAX) 25 MG tablet Take 1 tablet (25 mg) by mouth every 6 hours as needed for anxiety 40 tablet 0     insulin glargine (LANTUS PEN) 100 UNIT/ML pen Inject  4 Units Subcutaneous At Bedtime       insulin lispro (HUMALOG KWIKPEN) 100 UNIT/ML (1 unit dial) KWIKPEN Inject 1-7 Units Subcutaneous 4 times daily (with meals and nightly) Per the insulin sliding scale provided in the discharge summary. 3 mL 2     metFORMIN (GLUCOPHAGE) 500 MG tablet Take 2 tablets (1,000 mg) by mouth 2 times daily (with meals) 360 tablet 3     Ostomy Supplies MISC 20 each daily 20 each 11     pantoprazole (PROTONIX) 40 MG EC tablet Take 1 tablet (40 mg) by mouth every morning (before breakfast) 90 tablet 3     phentermine (ADIPEX-P) 37.5 MG tablet Take 0.5-1 tablets (18.75-37.5 mg) by mouth every morning (before breakfast) 90 tablet 0     predniSONE (DELTASONE) 20 MG tablet Take 20 mg by mouth daily 30 mg daily       rosuvastatin (CRESTOR) 10 MG tablet Take 1 tablet (10 mg) by mouth daily 90 tablet 3     simethicone (MYLICON) 80 MG chewable tablet Take 1-2 tablets ( mg) by mouth 4 times daily as needed for cramping 120 tablet 0     triamcinolone (KENALOG) 0.1 % paste 1 Application by Other route       venlafaxine (EFFEXOR) 75 MG tablet Take 1 tablet (75 mg) by mouth 3 times daily 90 tablet 5     VITAMIN D3 25 MCG (1000 UT) tablet TAKE THREE TABLETS BY MOUTH ONCE DAILY 300 tablet 1     VITRON-C  MG TABS tablet Take 1 tablet by mouth daily 30 tablet 1       Allergies:  Allergies   Allergen Reactions     Bee Venom Swelling     Azithromycin Diarrhea     Erythromycin      Other reaction(s): GI intolerance, Vomiting     Fentanyl Other (See Comments)     sweating  sweating     Prochlorperazine Fatigue     Other reaction(s): Other (see comments)  Fatigue     Buspirone      Other reaction(s): GI intolerance  vomiting     Erythrocin Nausea and Vomiting     Zithromax [Azithromycin Dihydrate] Diarrhea     Enbrel [Etanercept] Hives and Rash       Social history:   Social History     Tobacco Use     Smoking status: Former Smoker     Packs/day: 1.00     Years: 5.00     Pack years: 5.00     Quit  date: 3/20/1998     Years since quittin.5     Smokeless tobacco: Never Used   Substance Use Topics     Alcohol use: Yes     Comment: occ     Marital status: .    ROS:  A complete review of systems was performed with the patient and all systems negative except as per HPI.    Physical Examination:  There were no vitals taken for this visit.  General: Well hydrated. No acute distress.  Abdomen: Soft, NT, ileostomy is pink and patent, no evidence of parastomal hernia      Laboratory values reviewed:  Recent Labs   Lab Test 08/15/21  1405 21  0544 21  1356   WBC 12.9*   < > 28.3*   HGB 12.6   < > 14.0      < > 308   CR 0.66   < > 0.66   ALBUMIN 3.7   < > 3.4   BILITOTAL 0.2   < > 0.2   ALKPHOS 73   < > 52   ALT 28   < > 91*   AST 17   < > 21   INR  --   --  0.96    < > = values in this interval not displayed.       ASSESSMENT  45 y/o lady with UC s/p urgent TAC + end ileostomy.    Risks, benefits, and alternatives of operative treatment were thoroughly discussed with the patient, he/she understands these well and agrees to proceed.    PLAN  1. She is still having significant issues with arthritis pain in numerous joints; she is not a candidate for rituximab which was suggested by her rheumatologist at [facility]; she is still on steroids (10-20 mg of prednisone) to treat her arthritis pain  2. I suggested rheumatology referral here at [facility]  3. She is going to start taking loperamide 2 mg bid and recording 24 hour output from her ileostomy to make sure she is not becoming dehydrated; we will titrate imodium accordingly; over 20 minutes spent in counseling about monitoring ileostomy output and pharmacologic methods to manage this  4. RTC in 2 months to see how she is doing  5. At this point she is still on steroids for her arthritis and this is a suboptimal state for ileal pouch creation; she understands this    45 minutes spent on the date of the encounter doing chart review, history and  exam, imaging review, documentation and further activities as noted above.      Quinton Ram MD, PhD    Division of Colon and Rectal Surgery  [facility]    Referring Provider:  No referring provider defined for this encounter.     Primary Care Provider:  Anna Naidu

## 2021-09-23 NOTE — LETTER
2021       RE: Doretha Fernandez  [id number]  [place] [address]     Dear Colleague,    Thank you for referring your patient, Doretha Fernandez, to the [facility] COLON AND RECTAL SURGERY CLIN[facility] [place] [facility]. Please see a copy of my visit note below.    Colon and Rectal Surgery Clinic Note    RE: Doretha Fernandez.  : 1975.  JUNIOR: 2021.    Reason for visit: f/u UC s/p TAC w end ileostomy    HPI:     Doretha Fernandez is a 46 year old lady with UC.  On 6/15/2021 she underwent TAC with end ileostomy (laparoscopic) with me.  I last saw her 2 months ago during her first month of recovery.      Overall she is doing ok.  She is in constant pain from her arthritis which she describes as everywhere.  She has some weight while working with medical weight loss clinic and is now on phentermine.  She thinks she is 239 lbs.  She lost 15 lbs initially but thinks she has regained it since being on prednisone.  She takes 10 mg per day most of the time, but sometimes goes to 20 mg on bad days.    Her ileostomy is functioning OK.  She is not sure how much is coming out per day because she is not measuring it any more, but most days empties her bag 11 times per day.    She is eating about 1500 calories per day.  She knows certain foods cause her irritation.    Her pain is pretty severe.  It is mostly arthritis pain.  She was supposed to start rituximab for this, but then was told not to start a biologic because of risk of recurrence of melanoma.      Medical history:  Past Medical History:   Diagnosis Date     Abnormal MRI     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      Anxiety      Basal cell carcinoma      Cervical high risk HPV (human papillomavirus) test positive 2019    See problem list     Colitis      Depression      Diabetes mellitus, iatrogenic (H) 2020     Esophageal reflux      Inflammatory arthritis      Insomnia       Intestinal giardiasis 3/5/2018     Lumbago     left lower back pain     Lymphedema      Malignant melanoma (H)      Melanoma (H) 10/23/2017     Migraines      Mild persistent asthma      Morbid obesity with BMI of 40.0-44.9, adult (H)      STORMY (obstructive sleep apnea)      Prothrombin deficiency (H)     takes 81mg asa daily     Stroke (cerebrum) (H)     During      TIA (transient ischemic attack)      Type 2 diabetes mellitus (H)        Surgical history:  Past Surgical History:   Procedure Laterality Date     APPENDECTOMY       COLONOSCOPY N/A 10/18/2017    Procedure: COLONOSCOPY;  Colon;  Surgeon: Debbie Stephens MD;  [facility]     COLONOSCOPY N/A 3/9/2018    Procedure: COMBINED COLONOSCOPY, SINGLE OR MULTIPLE BIOPSY/POLYPECTOMY BY BIOPSY;  colon;  Surgeon: Bentia Schumacher MD;  [facility]     COLONOSCOPY      multiple since  to present - about 6 total     DISSECT LYMPH NODE AXILLA Left 10/23/2017    Procedure: DISSECT LYMPH NODE AXILLA;  Left Axillary Lymph Node Dissection ;  Surgeon: Laurent Cool MD;  [facility]     EXAM UNDER ANESTHESIA PELVIC N/A 2020    Procedure: EXAM UNDER ANESTHESIA, PELVIS; with Cervical Biopsies, Vaginal Biopsy and Endocervical Curettings;  Surgeon: Melina Jung MD;  [facility]     GYN SURGERY  ,          LAPAROSCOPIC ASSISTED COLECTOMY N/A 6/15/2021    Procedure: laparoscopic total abdominal colectomy, end ileostomy;  Surgeon: Quinton Ram MD;  [facility]     REPAIR MOHS Left 2017    Procedure: REPAIR MOHS;  Left Upper Lid Moh's Reconstruction;  Surgeon: Kisha Bosch MD;  [facility]       Family history:  Family History   Problem Relation Age of Onset     Cancer Mother 45        lung     Neurologic Disorder Mother         epilepsy     Lipids Father      Gastrointestinal Disease Father         diverticulitis      Depression Father      Colitis Father      Colon Cancer Father       Diverticulitis Father      Cancer Maternal Grandmother      Blood Disease Maternal Grandmother         lymphoma      Arthritis Maternal Grandmother      Diabetes Maternal Grandmother      Depression Maternal Grandmother      Macular Degeneration Maternal Grandmother      Glaucoma Maternal Grandmother      Diabetes Maternal Grandfather      Cerebrovascular Disease Maternal Grandfather      Blood Disease Maternal Grandfather      Heart Disease Maternal Grandfather      Glaucoma Maternal Grandfather      Cancer Paternal Grandmother      Cancer - colorectal Paternal Grandmother      Colitis Paternal Grandmother      Colon Cancer Paternal Grandmother      Diverticulitis Paternal Grandmother      Respiratory Paternal Grandfather         emphysema      Colitis Paternal Grandfather      Colon Cancer Paternal Grandfather      Diverticulitis Paternal Grandfather      Heart Disease Daughter      Asthma Daughter      Depression Sister      Melanoma No family hx of        Medications:  Current Outpatient Medications   Medication Sig Dispense Refill     acetaminophen (TYLENOL) 325 MG tablet Take 3 tablets (975 mg) by mouth every 8 hours As scheduled for the next 7-10 days, then decrease both dose & frequency to as needed there after. 90 tablet 0     Alcohol Swabs (ALCOHOL PREP) 70 % PADS 1 applicator 3 times daily 100 each 0     amLODIPine (NORVASC) 5 MG tablet Take 1 tablet (5 mg) by mouth daily 90 tablet 3     blood glucose (NO BRAND SPECIFIED) lancets standard Use to test blood sugar 3 times daily or as directed. 1 each 0     blood glucose (NO BRAND SPECIFIED) test strip Use to test blood sugar 3 times daily or as directed. 200 strip 0     Calcium Carb-Cholecalciferol 600-800 MG-UNIT TABS Take 800 mg by mouth every morning (before breakfast) 90 tablet 3     calcium carbonate (TUMS) 500 MG chewable tablet Take 1 tablet (500 mg) by mouth 3 times daily as needed for heartburn 30 tablet 0     cyanocobalamin (VITAMIN B-12) 1000 MCG  tablet Take 1 tablet (1,000 mcg) by mouth daily       gabapentin (NEURONTIN) 600 MG tablet Take 1 tablet (600 mg) by mouth 4 times daily 120 tablet 0     hydrOXYzine (ATARAX) 25 MG tablet Take 1 tablet (25 mg) by mouth every 6 hours as needed for anxiety 40 tablet 0     insulin glargine (LANTUS PEN) 100 UNIT/ML pen Inject 4 Units Subcutaneous At Bedtime       insulin lispro (HUMALOG KWIKPEN) 100 UNIT/ML (1 unit dial) KWIKPEN Inject 1-7 Units Subcutaneous 4 times daily (with meals and nightly) Per the insulin sliding scale provided in the discharge summary. 3 mL 2     metFORMIN (GLUCOPHAGE) 500 MG tablet Take 2 tablets (1,000 mg) by mouth 2 times daily (with meals) 360 tablet 3     Ostomy Supplies MISC 20 each daily 20 each 11     pantoprazole (PROTONIX) 40 MG EC tablet Take 1 tablet (40 mg) by mouth every morning (before breakfast) 90 tablet 3     phentermine (ADIPEX-P) 37.5 MG tablet Take 0.5-1 tablets (18.75-37.5 mg) by mouth every morning (before breakfast) 90 tablet 0     predniSONE (DELTASONE) 20 MG tablet Take 20 mg by mouth daily 30 mg daily       rosuvastatin (CRESTOR) 10 MG tablet Take 1 tablet (10 mg) by mouth daily 90 tablet 3     simethicone (MYLICON) 80 MG chewable tablet Take 1-2 tablets ( mg) by mouth 4 times daily as needed for cramping 120 tablet 0     triamcinolone (KENALOG) 0.1 % paste 1 Application by Other route       venlafaxine (EFFEXOR) 75 MG tablet Take 1 tablet (75 mg) by mouth 3 times daily 90 tablet 5     VITAMIN D3 25 MCG (1000 UT) tablet TAKE THREE TABLETS BY MOUTH ONCE DAILY 300 tablet 1     VITRON-C  MG TABS tablet Take 1 tablet by mouth daily 30 tablet 1       Allergies:  Allergies   Allergen Reactions     Bee Venom Swelling     Azithromycin Diarrhea     Erythromycin      Other reaction(s): GI intolerance, Vomiting     Fentanyl Other (See Comments)     sweating  sweating     Prochlorperazine Fatigue     Other reaction(s): Other (see comments)  Fatigue     Buspirone       Other reaction(s): GI intolerance  vomiting     Erythrocin Nausea and Vomiting     Zithromax [Azithromycin Dihydrate] Diarrhea     Enbrel [Etanercept] Hives and Rash       Social history:   Social History     Tobacco Use     Smoking status: Former Smoker     Packs/day: 1.00     Years: 5.00     Pack years: 5.00     Quit date: 3/20/1998     Years since quittin.5     Smokeless tobacco: Never Used   Substance Use Topics     Alcohol use: Yes     Comment: occ     Marital status: .    ROS:  A complete review of systems was performed with the patient and all systems negative except as per HPI.    Physical Examination:  There were no vitals taken for this visit.  General: Well hydrated. No acute distress.  Abdomen: Soft, NT, ileostomy is pink and patent, no evidence of parastomal hernia      Laboratory values reviewed:  Recent Labs   Lab Test 08/15/21  1405 21  0544 21  1356   WBC 12.9*   < > 28.3*   HGB 12.6   < > 14.0      < > 308   CR 0.66   < > 0.66   ALBUMIN 3.7   < > 3.4   BILITOTAL 0.2   < > 0.2   ALKPHOS 73   < > 52   ALT 28   < > 91*   AST 17   < > 21   INR  --   --  0.96    < > = values in this interval not displayed.       ASSESSMENT  45 y/o lady with UC s/p urgent TAC + end ileostomy.    Risks, benefits, and alternatives of operative treatment were thoroughly discussed with the patient, he/she understands these well and agrees to proceed.    PLAN  1. She is still having significant issues with arthritis pain in numerous joints; she is not a candidate for rituximab which was suggested by her rheumatologist at [facility]; she is still on steroids (10-20 mg of prednisone) to treat her arthritis pain  2. I suggested rheumatology referral here at [facility]  3. She is going to start taking loperamide 2 mg bid and recording 24 hour output from her ileostomy to make sure she is not becoming dehydrated; we will titrate imodium accordingly; over 20 minutes spent in counseling about monitoring  ileostomy output and pharmacologic methods to manage this  4. RTC in 2 months to see how she is doing  5. At this point she is still on steroids for her arthritis and this is a suboptimal state for ileal pouch creation; she understands this    45 minutes spent on the date of the encounter doing chart review, history and exam, imaging review, documentation and further activities as noted above.      Quinton Ram MD, PhD    Division of Colon and Rectal Surgery  [facility]    Referring Provider:  No referring provider defined for this encounter.     Primary Care Provider:  Anna Naidu        Again, thank you for allowing me to participate in the care of your patient.      Sincerely,    Quinton Ram MD

## 2021-09-23 NOTE — NURSING NOTE
"Chief Complaint   Patient presents with     Follow Up       Vitals:    09/23/21 1115   BP: 110/67   BP Location: Right arm   Patient Position: Sitting   Cuff Size: Adult Large   Pulse: 90   SpO2: 95%   Weight: 243 lb 4.8 oz   Height: 5' 3\"       Body mass index is 43.1 kg/m .    Tiffanie Naidu CMA    "

## 2021-09-24 DIAGNOSIS — M19.90 ARTHRITIS: Primary | ICD-10-CM

## 2021-10-01 NOTE — TELEPHONE ENCOUNTER
NOTES Status Details   OFFICE NOTE from referring provider Internal 09.24.2021 Quinton Ram MD   OFFICE NOTE from other specialist Care Everywhere              Internal 09.08.2021 Negrito Garcia, P.A.-C.    07.14.2021 Savannah Brady M.D., Ph.D.      07.28.2021 Anna Naidu MD     DISCHARGE SUMMARY from hospital N/A    DISCHARGE REPORT from the ER Internal 08.15.2021   MEDICATION LIST Internal /Care Everywhere    LABS (Any and all labs)      Internal /Care Everywhere    Biopsy/pathology (Anything related to diagnoses I.e. fluid aspirations, lip biopsy, muscle biopsy)      N/A     N/A    Imaging (All imaging related to diagnoses)     Echo N/A    HRCT N/A    CXR N/A    EMG N/A                   Scleroderma/Dermatomyositis diagnoses     Previous Cardiology notes  N/A    Previous Pulmonary notes N/A    Previous Dermatology notes N/A    Previous GI notes N/A    Lupus diagnoses     Previous Nephrology notes N/A    Previous Dermatology notes N/A    Previous Cardiology notes N/A

## 2021-10-04 ENCOUNTER — PRE VISIT (OUTPATIENT)
Dept: RHEUMATOLOGY | Facility: CLINIC | Age: 46
End: 2021-10-04

## 2021-10-04 ENCOUNTER — LAB (OUTPATIENT)
Dept: LAB | Facility: CLINIC | Age: 46
End: 2021-10-04
Attending: INTERNAL MEDICINE
Payer: COMMERCIAL

## 2021-10-04 ENCOUNTER — OFFICE VISIT (OUTPATIENT)
Dept: RHEUMATOLOGY | Facility: CLINIC | Age: 46
End: 2021-10-04
Attending: SURGERY
Payer: COMMERCIAL

## 2021-10-04 VITALS
SYSTOLIC BLOOD PRESSURE: 119 MMHG | DIASTOLIC BLOOD PRESSURE: 83 MMHG | HEART RATE: 81 BPM | BODY MASS INDEX: 42.96 KG/M2 | WEIGHT: 242.5 LBS | OXYGEN SATURATION: 97 %

## 2021-10-04 DIAGNOSIS — M19.90 ARTHRITIS: ICD-10-CM

## 2021-10-04 LAB
ALBUMIN SERPL-MCNC: 3.7 G/DL (ref 3.4–5)
ALP SERPL-CCNC: 76 U/L (ref 40–150)
ALT SERPL W P-5'-P-CCNC: 38 U/L (ref 0–50)
ANION GAP SERPL CALCULATED.3IONS-SCNC: 8 MMOL/L (ref 3–14)
AST SERPL W P-5'-P-CCNC: 11 U/L (ref 0–45)
BASOPHILS # BLD MANUAL: 0 10E3/UL (ref 0–0.2)
BASOPHILS NFR BLD MANUAL: 0 %
BILIRUB SERPL-MCNC: 0.1 MG/DL (ref 0.2–1.3)
BUN SERPL-MCNC: 14 MG/DL (ref 7–30)
CALCIUM SERPL-MCNC: 10 MG/DL (ref 8.5–10.1)
CHLORIDE BLD-SCNC: 104 MMOL/L (ref 94–109)
CO2 SERPL-SCNC: 26 MMOL/L (ref 20–32)
CREAT SERPL-MCNC: 0.76 MG/DL (ref 0.52–1.04)
CRP SERPL-MCNC: 13.2 MG/L (ref 0–8)
EOSINOPHIL # BLD MANUAL: 0 10E3/UL (ref 0–0.7)
EOSINOPHIL NFR BLD MANUAL: 0 %
ERYTHROCYTE [DISTWIDTH] IN BLOOD BY AUTOMATED COUNT: 13.4 % (ref 10–15)
ERYTHROCYTE [SEDIMENTATION RATE] IN BLOOD BY WESTERGREN METHOD: 12 MM/HR (ref 0–20)
GFR SERPL CREATININE-BSD FRML MDRD: >90 ML/MIN/1.73M2
GLUCOSE BLD-MCNC: 111 MG/DL (ref 70–99)
HCT VFR BLD AUTO: 38.9 % (ref 35–47)
HGB BLD-MCNC: 13 G/DL (ref 11.7–15.7)
LYMPHOCYTES # BLD MANUAL: 3.6 10E3/UL (ref 0.8–5.3)
LYMPHOCYTES NFR BLD MANUAL: 19 %
MCH RBC QN AUTO: 28.8 PG (ref 26.5–33)
MCHC RBC AUTO-ENTMCNC: 33.4 G/DL (ref 31.5–36.5)
MCV RBC AUTO: 86 FL (ref 78–100)
MONOCYTES # BLD MANUAL: 0.6 10E3/UL (ref 0–1.3)
MONOCYTES NFR BLD MANUAL: 3 %
MYELOCYTES # BLD MANUAL: 0.4 10E3/UL
MYELOCYTES NFR BLD MANUAL: 2 %
NEUTROPHILS # BLD MANUAL: 14.4 10E3/UL (ref 1.6–8.3)
NEUTROPHILS NFR BLD MANUAL: 76 %
PLAT MORPH BLD: ABNORMAL
PLATELET # BLD AUTO: 413 10E3/UL (ref 150–450)
POTASSIUM BLD-SCNC: 4 MMOL/L (ref 3.4–5.3)
PROT SERPL-MCNC: 7.8 G/DL (ref 6.8–8.8)
RBC # BLD AUTO: 4.52 10E6/UL (ref 3.8–5.2)
RBC MORPH BLD: ABNORMAL
SODIUM SERPL-SCNC: 138 MMOL/L (ref 133–144)
TSH SERPL DL<=0.005 MIU/L-ACNC: 1.04 MU/L (ref 0.4–4)
WBC # BLD AUTO: 19 10E3/UL (ref 4–11)

## 2021-10-04 PROCEDURE — 80053 COMPREHEN METABOLIC PANEL: CPT | Performed by: PATHOLOGY

## 2021-10-04 PROCEDURE — 85027 COMPLETE CBC AUTOMATED: CPT | Performed by: PATHOLOGY

## 2021-10-04 PROCEDURE — 36415 COLL VENOUS BLD VENIPUNCTURE: CPT | Performed by: PATHOLOGY

## 2021-10-04 PROCEDURE — 84443 ASSAY THYROID STIM HORMONE: CPT | Performed by: PATHOLOGY

## 2021-10-04 PROCEDURE — 86140 C-REACTIVE PROTEIN: CPT | Performed by: PATHOLOGY

## 2021-10-04 PROCEDURE — 99205 OFFICE O/P NEW HI 60 MIN: CPT | Performed by: INTERNAL MEDICINE

## 2021-10-04 PROCEDURE — G0463 HOSPITAL OUTPT CLINIC VISIT: HCPCS

## 2021-10-04 PROCEDURE — 85652 RBC SED RATE AUTOMATED: CPT | Performed by: PATHOLOGY

## 2021-10-04 NOTE — LETTER
10/4/2021       RE: Doretha Fernandez  [address]  [facility] [address]     Dear Colleague,    Thank you for referring your patient, Doretha Fernandez, to the [facility] RHEUMATOLOGY CLINIC [place] at [facility]. Please see a copy of my visit note below.    Rheumatology consultation note    CC: Joint pain and muscle pain throughout, second opinion    HPI: Doretha is 46 years old, and she has a complex medical history.  The brief story is that she was diagnosed with a metastatic melanoma with an unknown primary, and she was treated successfully with nivolumab, amongst other things, and she is considered to be cured from her melanoma.  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  She has also developed inflammatory arthritis and she was started on several medications including etanercept, adalimumab, tocilizumab, that did not really seem to be of much benefit.  Because of her arthritis she was started on prednisone as well and she is currently still on 20 mg of prednisone daily.  Attempts to taper in the past have failed, with recurrence of her arthritis and worsening pain and she is now not able to taper her prednisone.  She is gained significant amount of weight and she currently has diabetes, obesity, obstructive sleep apnea.  She is using CPAP but she has a feeling is not working correctly.  She is extremely fatigued and has pain everywhere and she is sleepy during the day and sometimes has to take naps during the day.  Physical activity on Monday will result in total lung capacity for the next few days.    Social history, she does not smoke, does not drink alcohol, no recreational drugs.  Until the spring 2021 she worked for [employer] which is a physical job with lots of walking.  However she could not continue this job because of the total body pain and arthritis.    Past  [facility]  [address]  [facility] [address]  Phone:  [phone]  Fax:  [phone]                                    Jairo Escamilla   MRN: [id number]    Department:  [facility]   Date of Visit:  3/17/2020           After Visit Summary Signature Page    I have received my discharge instructions, and my questions have been answered. I have discussed any challenges I see with this plan with the nurse or doctor.    ..........................................................................................................................................  Patient/Patient Representative Signature      ..........................................................................................................................................  Patient Representative Print Name and Relationship to Patient    ..................................................               ................................................  Date                                   Time    ..........................................................................................................................................  Reviewed by Signature/Title    ...................................................              ..............................................  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  Depression anxiety.    I have reviewed her medications as listed in epic.  I have also reviewed her laboratory test results, and her medical records including those from [facility].    Physical examination  I reviewed her vital signs and they are normal except for a BMI of 43.  Lungs are clear to auscultation,   heart regular rate and rhythm no murmurs normal S1-S2  Joint examination shows no active synovitis in the hands wrists elbows and shoulders.  Normal range of motion in the upper extremities.  She is painful throughout the exam.  Lower extremity exam shows that she has normal range of motion of the hips knees ankles and feet no active synovitis.  Exam is painful for her.    Impression/plan  This is a complicated story and my considerations are as follows.  1.  On exam today there is no active synovitis in any of her joints.  2.  She has pain throughout including any tender points are seen in fibromyalgia, both in the upper and lower extremities as well as the trunk.  3.  There is no clear reason why she should be on a statin as her cholesterol has never been dramatically elevated, and I suggested she stop this medication.  Statins are known to contribute to muscle and joint pain in some patients, and I am not sure if this plays a role at all but eliminating that medication from med list seems reasonable.  4.  I suggest a slow taper of her prednisone reducing by 2.5 mg every few weeks, starting tomorrow reducing her dose to 17.5 mg daily.  Very importantly start tapering her prednisone as she needs another surgery for reanastomosis.  5.  I suggest she contact sleep medicine to review her CPAP settings as I suspect that these are not correct for her at this time as she indicates its not working properly and also her family members have been indicated that when she is  asleep the machine is not working properly.  6.  I encouraged her to do mild exercises as tolerated to be sure that she does not have a setback with more muscle pain after a to vigorous exercise.  7.  We will repeat some blood work today including inflammatory markers, CBC, chemistries and thyroid.  8.  I reviewed the remainder of her chart that I did not get a chance to review today and I will get back to her by the end of this week with some further recommendations if there are major lab abnormalities.  9.  Have scheduled to follow-up in 1 month time, but she is welcome to come in earlier if needed.    Addendum  Here CRP is only slightly elevated at 13.2 which I interpret as normal given her increased weight. ESR is normal. The remainder of her lab tests are essentially normal.    I spent 60 minutes face to face with the patient, with 35 minutes on counseling and coordination of care.    Again, thank you for allowing me to participate in the care of your patient.      Sincerely,    Don Hopper MD

## 2021-10-04 NOTE — NURSING NOTE
Chief Complaint   Patient presents with     New Patient     RA         /83 (BP Location: Right arm, Patient Position: Sitting, Cuff Size: Adult Large)   Pulse 81   Wt 110 kg (242 lb 8 oz)   SpO2 97%   BMI 42.96 kg/m        Mundo Manley, EMT

## 2021-10-05 NOTE — PROGRESS NOTES
Rheumatology consultation note    CC: Joint pain and muscle pain throughout, second opinion    HPI: Doretha is 46 years old, and she has a complex medical history.  The brief story is that she was diagnosed with a metastatic melanoma with an unknown primary, and she was treated successfully with nivolumab, amongst other things, and she is considered to be cured from her melanoma.  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  She has also developed inflammatory arthritis and she was started on several medications including etanercept, adalimumab, tocilizumab, that did not really seem to be of much benefit.  Because of her arthritis she was started on prednisone as well and she is currently still on 20 mg of prednisone daily.  Attempts to taper in the past have failed, with recurrence of her arthritis and worsening pain and she is now not able to taper her prednisone.  She is gained significant amount of weight and she currently has diabetes, obesity, obstructive sleep apnea.  She is using CPAP but she has a feeling is not working correctly.  She is extremely fatigued and has pain everywhere and she is sleepy during the day and sometimes has to take naps during the day.  Physical activity on Monday will result in total lung capacity for the next few days.    Social history, she does not smoke, does not drink alcohol, no recreational drugs.  Until the spring 2021 she worked for [employer] which is a physical job with lots of walking.  However she could not continue this job because of the total body pain and arthritis.    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  Depression anxiety.    I have reviewed her medications as listed in epic.  I have also reviewed her laboratory test results, and her medical records including those from  [facility].    Physical examination  I reviewed her vital signs and they are normal except for a BMI of 43.  Lungs are clear to auscultation,   heart regular rate and rhythm no murmurs normal S1-S2  Joint examination shows no active synovitis in the hands wrists elbows and shoulders.  Normal range of motion in the upper extremities.  She is painful throughout the exam.  Lower extremity exam shows that she has normal range of motion of the hips knees ankles and feet no active synovitis.  Exam is painful for her.    Impression/plan  This is a complicated story and my considerations are as follows.  1.  On exam today there is no active synovitis in any of her joints.  2.  She has pain throughout including any tender points are seen in fibromyalgia, both in the upper and lower extremities as well as the trunk.  3.  There is no clear reason why she should be on a statin as her cholesterol has never been dramatically elevated, and I suggested she stop this medication.  Statins are known to contribute to muscle and joint pain in some patients, and I am not sure if this plays a role at all but eliminating that medication from med list seems reasonable.  4.  I suggest a slow taper of her prednisone reducing by 2.5 mg every few weeks, starting tomorrow reducing her dose to 17.5 mg daily.  Very importantly start tapering her prednisone as she needs another surgery for reanastomosis.  5.  I suggest she contact sleep medicine to review her CPAP settings as I suspect that these are not correct for her at this time as she indicates its not working properly and also her family members have been indicated that when she is asleep the machine is not working properly.  6.  I encouraged her to do mild exercises as tolerated to be sure that she does not have a setback with more muscle pain after a to vigorous exercise.  7.  We will repeat some blood work today including inflammatory markers, CBC, chemistries and thyroid.  8.  I reviewed  the remainder of her chart that I did not get a chance to review today and I will get back to her by the end of this week with some further recommendations if there are major lab abnormalities.  9.  Have scheduled to follow-up in 1 month time, but she is welcome to come in earlier if needed.    Addendum  Here CRP is only slightly elevated at 13.2 which I interpret as normal given her increased weight. ESR is normal. The remainder of her lab tests are essentially normal.    I spent 60 minutes face to face with the patient, with 35 minutes on counseling and coordination of care.

## 2021-10-07 DIAGNOSIS — E55.9 VITAMIN D DEFICIENCY: ICD-10-CM

## 2021-10-12 RX ORDER — CHOLECALCIFEROL (VITAMIN D3) 25 MCG
TABLET ORAL
Qty: 300 TABLET | Refills: 1 | Status: SHIPPED | OUTPATIENT
Start: 2021-10-12 | End: 2022-11-17

## 2021-10-15 ENCOUNTER — TELEPHONE (OUTPATIENT)
Dept: LAB | Facility: CLINIC | Age: 46
End: 2021-10-15

## 2021-10-15 NOTE — TELEPHONE ENCOUNTER
Prior Authorization Retail Medication Request    Medication/Dose: Pantoprazole sodium 40 tbec  ICD code (if different than what is on RX):    Previously Tried and Failed:    Rationale:      Insurance Name:  [employer]  Insurance ID:  [id number]      Pharmacy Information (if different than what is on RX)  Name:  [facility]  Phone: [phone]

## 2021-10-19 NOTE — TELEPHONE ENCOUNTER
Central Prior Authorization Team   Phone: [phone]      PA Initiation    Medication: Pantoprazole sodium 40 tbec-Initiated  Insurance Company: [employer] - Phone [phone] Fax [phone]  Pharmacy Filling the Rx: [place], [place] - [address]  Filling Pharmacy Phone: [phone]  Filling Pharmacy Fax:    Start Date: 10/19/2021

## 2021-10-20 NOTE — TELEPHONE ENCOUNTER
PRIOR AUTHORIZATION DENIED    Medication: Pantoprazole sodium 40 tbec-DENIED    Denial Date: 10/19/2021    Denial Rational: Requested quantity is larger/greater than allowed by insurance.  All PPIs are covered up to 1 per day for 120 days within 365 days.  If an appeal is desired please let the PA team know when a letter of medical necessity has been written explaining why the patient needs more than the 120 per 365.          Appeal Information:

## 2021-10-21 NOTE — TELEPHONE ENCOUNTER
DR. Naidu  PA for pantoprazole has been denied.    Denial Rational: Requested quantity is larger/greater than allowed by insurance.  All PPIs are covered up to 1 per day for 120 days within 365 days.  If an appeal is desired please let the PA team know when a letter of medical necessity has been written explaining why the patient needs more than the 120 per 365.

## 2021-10-21 NOTE — TELEPHONE ENCOUNTER
Please notify patient that the pantoprazole was denied by insurance.     May use over the counter omeprazole 20 mg daily if symptoms of reflux are still present.    Anna Naidu M.D.

## 2021-10-21 NOTE — TELEPHONE ENCOUNTER
Pt informed Pantoprazole was denied by Ins. And to take Omeprazole 20 mg OTC med. [facility]

## 2021-10-25 ENCOUNTER — TELEPHONE (OUTPATIENT)
Dept: SURGERY | Facility: CLINIC | Age: 46
End: 2021-10-25

## 2021-10-25 NOTE — TELEPHONE ENCOUNTER
Patient would like to pause her participation in the Weight Management Program due to her use of Prednisone which is making it very difficult for her to make progress toward her weight loss goals. She plans to contact the clinic to get rescheduled when she is off prednisone.    Yolette Valdivia RD, LD

## 2021-10-27 ENCOUNTER — TELEPHONE (OUTPATIENT)
Facility: CLINIC | Age: 46
End: 2021-10-27

## 2021-10-27 DIAGNOSIS — R19.8 HIGH OUTPUT ILEOSTOMY (H): Primary | ICD-10-CM

## 2021-10-27 DIAGNOSIS — Z93.2 HIGH OUTPUT ILEOSTOMY (H): Primary | ICD-10-CM

## 2021-10-27 NOTE — TELEPHONE ENCOUNTER
Rx sent to pharmacy for loperamide 2mg caps qnty only #2. Please resend for larger qnty as appropriate. Pt requested.  Thank you  Roxana Jaime RPh  [facility]    [phone]

## 2021-11-03 RX ORDER — LOPERAMIDE HYDROCHLORIDE 2 MG/1
TABLET ORAL
Qty: 240 TABLET | Refills: 3 | Status: ON HOLD | OUTPATIENT
Start: 2021-11-03 | End: 2023-09-06

## 2021-11-09 ENCOUNTER — MYC MEDICAL ADVICE (OUTPATIENT)
Dept: FAMILY MEDICINE | Facility: CLINIC | Age: 46
End: 2021-11-09
Payer: COMMERCIAL

## 2021-11-11 ENCOUNTER — TELEPHONE (OUTPATIENT)
Dept: RHEUMATOLOGY | Facility: CLINIC | Age: 46
End: 2021-11-11

## 2021-11-11 ENCOUNTER — OFFICE VISIT (OUTPATIENT)
Dept: RHEUMATOLOGY | Facility: CLINIC | Age: 46
End: 2021-11-11
Attending: INTERNAL MEDICINE
Payer: COMMERCIAL

## 2021-11-11 VITALS
BODY MASS INDEX: 43.98 KG/M2 | SYSTOLIC BLOOD PRESSURE: 118 MMHG | WEIGHT: 248.3 LBS | OXYGEN SATURATION: 98 % | DIASTOLIC BLOOD PRESSURE: 84 MMHG | HEART RATE: 92 BPM

## 2021-11-11 DIAGNOSIS — G47.33 OBSTRUCTIVE SLEEP APNEA: Primary | ICD-10-CM

## 2021-11-11 DIAGNOSIS — K21.9 GASTROESOPHAGEAL REFLUX DISEASE WITHOUT ESOPHAGITIS: ICD-10-CM

## 2021-11-11 DIAGNOSIS — M19.90 ARTHRITIS: ICD-10-CM

## 2021-11-11 PROCEDURE — 99214 OFFICE O/P EST MOD 30 MIN: CPT | Performed by: INTERNAL MEDICINE

## 2021-11-11 PROCEDURE — G0463 HOSPITAL OUTPT CLINIC VISIT: HCPCS

## 2021-11-11 RX ORDER — OMEPRAZOLE 40 MG/1
40 CAPSULE, DELAYED RELEASE ORAL DAILY
Qty: 30 CAPSULE | Refills: 5 | Status: SHIPPED | OUTPATIENT
Start: 2021-11-11 | End: 2022-05-18 | Stop reason: ALTCHOICE

## 2021-11-11 RX ORDER — PREDNISONE 1 MG/1
TABLET ORAL
Qty: 120 TABLET | Refills: 2 | Status: SHIPPED | OUTPATIENT
Start: 2021-11-11 | End: 2022-10-31

## 2021-11-11 RX ORDER — PREDNISONE 5 MG/1
TABLET ORAL
Qty: 60 TABLET | Refills: 3 | Status: SHIPPED | OUTPATIENT
Start: 2021-11-11 | End: 2022-05-18

## 2021-11-11 ASSESSMENT — PAIN SCALES - GENERAL: PAINLEVEL: MODERATE PAIN (4)

## 2021-11-11 NOTE — NURSING NOTE
Chief Complaint   Patient presents with     RECHECK     follow up arthritis       /84   Pulse 92   Wt 112.6 kg (248 lb 4.8 oz)   SpO2 98%   BMI 43.98 kg/m        Kailee GAO, [place]

## 2021-11-11 NOTE — LETTER
11/11/2021       RE: Doretha Yu  [address]  [facility] [address]     Dear Colleague,    Thank you for referring your patient, Doretha Yu, to the [facility] RHEUMATOLOGY CLINIC [place] at [facility]. Please see a copy of my visit note below.    Rheumatology consultation note    CC: Follow-up visit for chronic pain syndrome and the use of chronic prednisone    HPI: Doretha is here for second visit and she reports that she has been able to reduce her prednisone dose from 20 mg daily on her previous visit a month ago to now 12.5 mg daily currently.  She is also been trying to get a reassessment of her CPAP settings, but she was told that she needed a new referral.  Further, she stopped her statin.  She has had difficulty with her medical insurance as her work discontinued her insurance and later reinstated it, admitting that they had terminated her insurance incorrectly.  However as a result she incurred several medical bills during the time she was not covered that she is trying to have them pay, which seems reasonable to me.  Importantly, she has been able to taper her prednisone and she has not gained any weight and she is trying to exercise as much as she can.  She did experience more pain especially on days after she did too much exercise.  She is determined in reducing her prednisone as she needs her prednisone dose less than 10 mg before she will be eligible for abdominal surgery.    Physical examination  I reviewed her vital signs and they are normal but she does have an elevated BMI of 43.  Joint examination shows no active synovitis in her hands are wrists or elbows with full range of motion including the shoulders.  She does have tenderness on palpation.    Impression/plan  1.  It is very encouraging that she was able to reduce her prednisone from 20 mg down to 12.5 mg daily.  I suggest a somewhat slower taper now from 12.5mg to 10 mg, and  then reducing by 1 mg every 2 weeks.  It is important to get adjusted to the lower dose of prednisone before tapering further.  2.  I submitted a referral for a new sleep study and assessment of her CPAP settings.  Right now she feels that her CPAP is not working correctly.  3.  It is important that she does daily exercise, but not overdo it.  Prednisone is known to induce muscle weakness and atrophy, and only daily exercise can prevent that.  4.  I have prescribed Prilosec and prednisone tablets.  5.  Plan to see her back in 3 months time or earlier if needed.    I spent 30 minutes face to face with the patient, with 25 minutes on counseling and coordination of care.    Don Hopper MD

## 2021-11-11 NOTE — PROGRESS NOTES
Rheumatology consultation note    CC: Follow-up visit for chronic pain syndrome and the use of chronic prednisone    HPI: Doretha is here for second visit and she reports that she has been able to reduce her prednisone dose from 20 mg daily on her previous visit a month ago to now 12.5 mg daily currently.  She is also been trying to get a reassessment of her CPAP settings, but she was told that she needed a new referral.  Further, she stopped her statin.  She has had difficulty with her medical insurance as her work discontinued her insurance and later reinstated it, admitting that they had terminated her insurance incorrectly.  However as a result she incurred several medical bills during the time she was not covered that she is trying to have them pay, which seems reasonable to me.  Importantly, she has been able to taper her prednisone and she has not gained any weight and she is trying to exercise as much as she can.  She did experience more pain especially on days after she did too much exercise.  She is determined in reducing her prednisone as she needs her prednisone dose less than 10 mg before she will be eligible for abdominal surgery.    Physical examination  I reviewed her vital signs and they are normal but she does have an elevated BMI of 43.  Joint examination shows no active synovitis in her hands are wrists or elbows with full range of motion including the shoulders.  She does have tenderness on palpation.    Impression/plan  1.  It is very encouraging that she was able to reduce her prednisone from 20 mg down to 12.5 mg daily.  I suggest a somewhat slower taper now from 12.5mg to 10 mg, and then reducing by 1 mg every 2 weeks.  It is important to get adjusted to the lower dose of prednisone before tapering further.  2.  I submitted a referral for a new sleep study and assessment of her CPAP settings.  Right now she feels that her CPAP is not working correctly.  3.  It is important that she does daily  exercise, but not overdo it.  Prednisone is known to induce muscle weakness and atrophy, and only daily exercise can prevent that.  4.  I have prescribed Prilosec and prednisone tablets.  5.  Plan to see her back in 3 months time or earlier if needed.    I spent 30 minutes face to face with the patient, with 25 minutes on counseling and coordination of care.

## 2021-11-11 NOTE — TELEPHONE ENCOUNTER
[facility] Call Center    Phone Message    May a detailed message be left on voicemail: yes     Reason for Call: Medication Question or concern regarding medication   Prescription Clarification  Name of Medication: prednison  Prescribing Provider: Dr. Hopper   Pharmacy: [facility]   What on the order needs clarification? Need clarification on prednisone taper          Action Taken: Message routed to:  [facility] Rheum    Travel Screening: Not Applicable

## 2021-11-11 NOTE — TELEPHONE ENCOUNTER
Spoke with Jeffry, pharmacist at [facility].  Confirmed that patient was instructed during visit with Dr. Hopper today to discontinue previous orders for prednisone 12.5. mg daily start a gradual taper starting at 10 mg daily.     Laura Ortega RN  Adult Rheumatology Clinic

## 2021-12-23 DIAGNOSIS — G89.29 OTHER CHRONIC PAIN: ICD-10-CM

## 2021-12-23 RX ORDER — GABAPENTIN 600 MG/1
TABLET ORAL
Qty: 120 TABLET | Refills: 0 | Status: SHIPPED | OUTPATIENT
Start: 2021-12-23 | End: 2022-03-11

## 2021-12-23 NOTE — TELEPHONE ENCOUNTER
Pending Prescriptions:                       Disp   Refills    gabapentin (NEURONTIN) 600 MG tablet [Phar*120 ta*0        Sig: TAKE ONE TABLET (600MG) BY MOUTH FOUR TIMES A DAY    Routing refill request to provider for review/approval because:  Drug not on the [facility] refill protocol     Arianna Mirza RN

## 2022-01-04 ENCOUNTER — TELEPHONE (OUTPATIENT)
Dept: FAMILY MEDICINE | Facility: CLINIC | Age: 47
End: 2022-01-04
Payer: COMMERCIAL

## 2022-01-04 NOTE — TELEPHONE ENCOUNTER
This spoke with pt, she was advised to talk the the pharmacist at her pharmacy about the medication interactions as they are the best resource.   Floridalma Wilde RN

## 2022-01-04 NOTE — TELEPHONE ENCOUNTER
Reason for Call:  Covid concerns.      Detailed comments: Patient tested positive for Covid.  She takes venlafaxine and the interactions say not to take with Robitussin.  She is wondering what else she can take.      Phone Number Patient can be reached at: [phone]    Can we leave a detailed message on this number?  Yes    Call taken on 1/4/2022 at 3:03 PM by Swapna James

## 2022-01-06 ENCOUNTER — HOSPITAL ENCOUNTER (EMERGENCY)
Facility: CLINIC | Age: 47
Discharge: HOME | End: 2022-01-07
Attending: EMERGENCY MEDICINE | Admitting: EMERGENCY MEDICINE
Payer: COMMERCIAL

## 2022-01-06 ENCOUNTER — APPOINTMENT (OUTPATIENT)
Dept: GENERAL RADIOLOGY | Facility: CLINIC | Age: 47
End: 2022-01-06
Attending: EMERGENCY MEDICINE
Payer: COMMERCIAL

## 2022-01-06 ENCOUNTER — TELEPHONE (OUTPATIENT)
Dept: FAMILY MEDICINE | Facility: CLINIC | Age: 47
End: 2022-01-06
Payer: COMMERCIAL

## 2022-01-06 DIAGNOSIS — J12.82 PNEUMONIA DUE TO 2019 NOVEL CORONAVIRUS: ICD-10-CM

## 2022-01-06 DIAGNOSIS — U07.1 PNEUMONIA DUE TO 2019 NOVEL CORONAVIRUS: ICD-10-CM

## 2022-01-06 LAB
BASE EXCESS BLDV CALC-SCNC: 2.2 MMOL/L (ref -7.7–1.9)
BASOPHILS # BLD AUTO: 0 10E3/UL (ref 0–0.2)
BASOPHILS NFR BLD AUTO: 0 %
D DIMER PPP FEU-MCNC: 0.73 UG/ML FEU (ref 0–0.5)
EOSINOPHIL # BLD AUTO: 0.1 10E3/UL (ref 0–0.7)
EOSINOPHIL NFR BLD AUTO: 1 %
ERYTHROCYTE [DISTWIDTH] IN BLOOD BY AUTOMATED COUNT: 14.5 % (ref 10–15)
HCO3 BLDV-SCNC: 27 MMOL/L (ref 21–28)
HCT VFR BLD AUTO: 36.6 % (ref 35–47)
HGB BLD-MCNC: 11.9 G/DL (ref 11.7–15.7)
IMM GRANULOCYTES # BLD: 0.1 10E3/UL
IMM GRANULOCYTES NFR BLD: 1 %
LACTATE SERPL-SCNC: 1.1 MMOL/L (ref 0.7–2)
LYMPHOCYTES # BLD AUTO: 2 10E3/UL (ref 0.8–5.3)
LYMPHOCYTES NFR BLD AUTO: 26 %
MAGNESIUM SERPL-MCNC: 2.1 MG/DL (ref 1.6–2.3)
MCH RBC QN AUTO: 27.9 PG (ref 26.5–33)
MCHC RBC AUTO-ENTMCNC: 32.5 G/DL (ref 31.5–36.5)
MCV RBC AUTO: 86 FL (ref 78–100)
MONOCYTES # BLD AUTO: 0.8 10E3/UL (ref 0–1.3)
MONOCYTES NFR BLD AUTO: 10 %
NEUTROPHILS # BLD AUTO: 4.8 10E3/UL (ref 1.6–8.3)
NEUTROPHILS NFR BLD AUTO: 62 %
NRBC # BLD AUTO: 0 10E3/UL
NRBC BLD AUTO-RTO: 0 /100
O2/TOTAL GAS SETTING VFR VENT: 0 %
PCO2 BLDV: 44 MM HG (ref 40–50)
PH BLDV: 7.4 [PH] (ref 7.32–7.43)
PLATELET # BLD AUTO: 256 10E3/UL (ref 150–450)
PO2 BLDV: 26 MM HG (ref 25–47)
RBC # BLD AUTO: 4.26 10E6/UL (ref 3.8–5.2)
WBC # BLD AUTO: 7.8 10E3/UL (ref 4–11)

## 2022-01-06 PROCEDURE — 96365 THER/PROPH/DIAG IV INF INIT: CPT | Mod: 59 | Performed by: EMERGENCY MEDICINE

## 2022-01-06 PROCEDURE — 250N000013 HC RX MED GY IP 250 OP 250 PS 637: Performed by: FAMILY MEDICINE

## 2022-01-06 PROCEDURE — 82728 ASSAY OF FERRITIN: CPT | Performed by: EMERGENCY MEDICINE

## 2022-01-06 PROCEDURE — 82040 ASSAY OF SERUM ALBUMIN: CPT | Performed by: EMERGENCY MEDICINE

## 2022-01-06 PROCEDURE — 71045 X-RAY EXAM CHEST 1 VIEW: CPT

## 2022-01-06 PROCEDURE — 80053 COMPREHEN METABOLIC PANEL: CPT | Performed by: EMERGENCY MEDICINE

## 2022-01-06 PROCEDURE — 99285 EMERGENCY DEPT VISIT HI MDM: CPT | Mod: 25 | Performed by: EMERGENCY MEDICINE

## 2022-01-06 PROCEDURE — 36415 COLL VENOUS BLD VENIPUNCTURE: CPT | Performed by: EMERGENCY MEDICINE

## 2022-01-06 PROCEDURE — 82803 BLOOD GASES ANY COMBINATION: CPT | Performed by: EMERGENCY MEDICINE

## 2022-01-06 PROCEDURE — 99285 EMERGENCY DEPT VISIT HI MDM: CPT | Performed by: EMERGENCY MEDICINE

## 2022-01-06 PROCEDURE — 86140 C-REACTIVE PROTEIN: CPT | Performed by: EMERGENCY MEDICINE

## 2022-01-06 PROCEDURE — C9803 HOPD COVID-19 SPEC COLLECT: HCPCS | Performed by: EMERGENCY MEDICINE

## 2022-01-06 PROCEDURE — 85379 FIBRIN DEGRADATION QUANT: CPT | Performed by: EMERGENCY MEDICINE

## 2022-01-06 PROCEDURE — 87040 BLOOD CULTURE FOR BACTERIA: CPT | Performed by: EMERGENCY MEDICINE

## 2022-01-06 PROCEDURE — 84484 ASSAY OF TROPONIN QUANT: CPT | Performed by: EMERGENCY MEDICINE

## 2022-01-06 PROCEDURE — 83605 ASSAY OF LACTIC ACID: CPT | Performed by: EMERGENCY MEDICINE

## 2022-01-06 PROCEDURE — 85025 COMPLETE CBC W/AUTO DIFF WBC: CPT | Performed by: EMERGENCY MEDICINE

## 2022-01-06 PROCEDURE — 250N000013 HC RX MED GY IP 250 OP 250 PS 637: Performed by: EMERGENCY MEDICINE

## 2022-01-06 PROCEDURE — 83735 ASSAY OF MAGNESIUM: CPT | Performed by: EMERGENCY MEDICINE

## 2022-01-06 PROCEDURE — 87636 SARSCOV2 & INF A&B AMP PRB: CPT | Performed by: EMERGENCY MEDICINE

## 2022-01-06 PROCEDURE — 96366 THER/PROPH/DIAG IV INF ADDON: CPT | Performed by: EMERGENCY MEDICINE

## 2022-01-06 PROCEDURE — 258N000003 HC RX IP 258 OP 636: Performed by: EMERGENCY MEDICINE

## 2022-01-06 RX ORDER — OXYCODONE HYDROCHLORIDE 5 MG/1
10 TABLET ORAL ONCE
Status: COMPLETED | OUTPATIENT
Start: 2022-01-06 | End: 2022-01-06

## 2022-01-06 RX ORDER — ACETAMINOPHEN 500 MG
1000 TABLET ORAL ONCE
Status: COMPLETED | OUTPATIENT
Start: 2022-01-06 | End: 2022-01-06

## 2022-01-06 RX ADMIN — SODIUM CHLORIDE, POTASSIUM CHLORIDE, SODIUM LACTATE AND CALCIUM CHLORIDE 1000 ML: 600; 310; 30; 20 INJECTION, SOLUTION INTRAVENOUS at 23:09

## 2022-01-06 RX ADMIN — ACETAMINOPHEN 1000 MG: 500 TABLET, FILM COATED ORAL at 20:48

## 2022-01-06 RX ADMIN — OXYCODONE HYDROCHLORIDE 10 MG: 5 TABLET ORAL at 23:09

## 2022-01-06 ASSESSMENT — MIFFLIN-ST. JEOR: SCORE: 1702.76

## 2022-01-06 NOTE — TELEPHONE ENCOUNTER
S-(situation): uncontrollable coughing, high fever, dizzy, lightheaded, COVID positive    B-(background): started Monday after returning from a vacation    A-(assessment): patient was called, she is breathing fine but is coughing so much she is having difficulty, has a stoma and it is bleeding from the excessive accessory muscle movement, has decreased output in the stoma, says she has concentrated urine, she is drinking but is sweating excessively due to fever that she is weak, has high fever of 100 now, was 103.7 yesterday, is taking ibuprofen which she is not suppose to take to help lower the fever, patient is producing clear drainage out of her ears, has blood clots coming out of her rectum as she still has one despite ostomy. Patient is coughing uncontrollably on the phone. Patient is dizzy and lightheaded, felt like passing out earlier.    R-(recommendations): Patient is advised to seek the ER at [place] now, patient started to cry, she says she has no one to drive her till later, patient is told that if she gets worse can call an ambulance, if feels she can wait till family member gets home this evening then have that individual drive patient in as soon as able to, while waiting try warm teas to sooth the cough, deep breathing to relax. Patient feels she can wait and is agreeable to be seen in the ER.      TEJINDER Rees

## 2022-01-06 NOTE — TELEPHONE ENCOUNTER
Reason for call:  Patient reporting a symptom    Symptom or request: Patient is calling saying that she is positive for covid and would like to talk to a nurse about what to take that its not getting better that she is getting worse and her cough is getting worse.      Phone Number patient can be reached at:  Home number on file [phone] (home)    Best Time:  any    Can we leave a detailed message on this number:  YES    Call taken on 1/6/2022 at 4:01 PM by Vonda Champagne

## 2022-01-07 ENCOUNTER — APPOINTMENT (OUTPATIENT)
Dept: CT IMAGING | Facility: CLINIC | Age: 47
End: 2022-01-07
Attending: EMERGENCY MEDICINE
Payer: COMMERCIAL

## 2022-01-07 ENCOUNTER — PATIENT OUTREACH (OUTPATIENT)
Dept: CARE COORDINATION | Facility: CLINIC | Age: 47
End: 2022-01-07
Payer: COMMERCIAL

## 2022-01-07 VITALS
WEIGHT: 240 LBS | RESPIRATION RATE: 18 BRPM | HEART RATE: 79 BPM | HEIGHT: 63 IN | TEMPERATURE: 99.2 F | BODY MASS INDEX: 42.52 KG/M2 | DIASTOLIC BLOOD PRESSURE: 72 MMHG | SYSTOLIC BLOOD PRESSURE: 110 MMHG | OXYGEN SATURATION: 96 %

## 2022-01-07 LAB
ALBUMIN SERPL-MCNC: 2.9 G/DL (ref 3.4–5)
ALP SERPL-CCNC: 83 U/L (ref 40–150)
ALT SERPL W P-5'-P-CCNC: 65 U/L (ref 0–50)
ANION GAP SERPL CALCULATED.3IONS-SCNC: 10 MMOL/L (ref 3–14)
AST SERPL W P-5'-P-CCNC: 44 U/L (ref 0–45)
BILIRUB SERPL-MCNC: 0.2 MG/DL (ref 0.2–1.3)
BUN SERPL-MCNC: 13 MG/DL (ref 7–30)
CALCIUM SERPL-MCNC: 8.9 MG/DL (ref 8.5–10.1)
CHLORIDE BLD-SCNC: 102 MMOL/L (ref 94–109)
CO2 SERPL-SCNC: 25 MMOL/L (ref 20–32)
CREAT SERPL-MCNC: 0.96 MG/DL (ref 0.52–1.04)
CRP SERPL-MCNC: 88.1 MG/L (ref 0–8)
FERRITIN SERPL-MCNC: 210 NG/ML (ref 8–252)
FLUAV RNA SPEC QL NAA+PROBE: NEGATIVE
FLUBV RNA RESP QL NAA+PROBE: NEGATIVE
GFR SERPL CREATININE-BSD FRML MDRD: 74 ML/MIN/1.73M2
GLUCOSE BLD-MCNC: 114 MG/DL (ref 70–99)
HOLD SPECIMEN: NORMAL
POTASSIUM BLD-SCNC: 3.3 MMOL/L (ref 3.4–5.3)
PROT SERPL-MCNC: 7.5 G/DL (ref 6.8–8.8)
SARS-COV-2 RNA RESP QL NAA+PROBE: POSITIVE
SODIUM SERPL-SCNC: 137 MMOL/L (ref 133–144)
TROPONIN I SERPL HS-MCNC: 6 NG/L

## 2022-01-07 PROCEDURE — 250N000011 HC RX IP 250 OP 636: Performed by: EMERGENCY MEDICINE

## 2022-01-07 PROCEDURE — 250N000009 HC RX 250: Performed by: EMERGENCY MEDICINE

## 2022-01-07 PROCEDURE — 74177 CT ABD & PELVIS W/CONTRAST: CPT

## 2022-01-07 RX ORDER — OXYCODONE HYDROCHLORIDE 5 MG/1
5 TABLET ORAL EVERY 6 HOURS PRN
Qty: 6 TABLET | Refills: 0 | Status: SHIPPED | OUTPATIENT
Start: 2022-01-07 | End: 2022-02-07

## 2022-01-07 RX ORDER — ACETAMINOPHEN 500 MG
1000 TABLET ORAL ONCE
Status: DISCONTINUED | OUTPATIENT
Start: 2022-01-07 | End: 2022-01-07 | Stop reason: HOSPADM

## 2022-01-07 RX ORDER — IOPAMIDOL 755 MG/ML
91 INJECTION, SOLUTION INTRAVASCULAR ONCE
Status: COMPLETED | OUTPATIENT
Start: 2022-01-07 | End: 2022-01-07

## 2022-01-07 RX ORDER — OXYCODONE HYDROCHLORIDE 5 MG/1
5 TABLET ORAL ONCE
Status: DISCONTINUED | OUTPATIENT
Start: 2022-01-07 | End: 2022-01-07 | Stop reason: HOSPADM

## 2022-01-07 RX ADMIN — IOPAMIDOL 91 ML: 755 INJECTION, SOLUTION INTRAVENOUS at 01:43

## 2022-01-07 RX ADMIN — SODIUM CHLORIDE 100 ML: 9 INJECTION, SOLUTION INTRAVENOUS at 01:42

## 2022-01-07 NOTE — ED TRIAGE NOTES
Pt here with covid concerns. Cough, fever, dehydration, chills, pain all over, bleeding from her stoma, and blood clots from her rectum. Pt has been unable to manage her meds. Symptoms started Sunday. Just flew home from [place].

## 2022-01-07 NOTE — ED PROVIDER NOTES
History     Chief Complaint   Patient presents with     Covid Concern     HPI  Doretha Yu is a 46 year old female with history of malignant melanoma of the left upper extremity was treated with immunomodulation which unfortunately caused ulcerative colitis and rheumatoid arthritis who is now on chronic prednisone and insulin for diabetes who is vaccinated and boosted against COVID19 who presents with known COVID-19 infection.  The patient says that she has been sick for the past 5 days.  Fevers up to 102  F at home.  She feels weak and rundown.  She reports pain all over, especially pain in her chest, constant, hurts to cough, achiness, sharp, rated as severe.  She is complaining of diffuse abdominal pain as well.  No nausea or vomiting.  She reports headache, runny nose, sore throat, bilateral ear pain.  She has normal consistency of the stool from her stoma but has less volume.  There is some wound breakdown around the stoma site with a little bit of blood coming from this area.  No dysuria or urinary frequency.    Allergies:  Allergies   Allergen Reactions     Bee Venom Swelling     Azithromycin Diarrhea     Erythromycin      Other reaction(s): GI intolerance, Vomiting     Fentanyl Other (See Comments)     sweating  sweating     Prochlorperazine Fatigue     Other reaction(s): Other (see comments)  Fatigue     Buspirone      Other reaction(s): GI intolerance  vomiting     Erythrocin Nausea and Vomiting     Zithromax [Azithromycin Dihydrate] Diarrhea     Enbrel [Etanercept] Hives and Rash       Problem List:    Patient Active Problem List    Diagnosis Date Noted     Migraines      Priority: Medium     Colostomy in place (H) 07/16/2021     Priority: Medium     S/P colectomy 07/16/2021     Priority: Medium     Ulcerative colitis without complications, unspecified location (H) 06/03/2021     Priority: Medium     Dehydration 04/13/2021     Priority: Medium     Hematochezia 04/13/2021     Priority: Medium      Diarrhea of infectious origin 04/13/2021     Priority: Medium     C. difficile colitis 04/13/2021     Priority: Medium     Adjustment disorder with mixed emotional features 06/16/2020     Priority: Medium     Polyarthralgia 04/29/2020     Priority: Medium     Drug-induced polyneuropathy (H) 04/29/2020     Priority: Medium     Pain syndrome, chronic 02/12/2020     Priority: Medium     Drug-induced Cushing's syndrome (H) 02/12/2020     Priority: Medium     Diabetes mellitus, iatrogenic (H) 01/28/2020     Priority: Medium     High risk HPV infection 01/28/2020     Priority: Medium     Added automatically from request for surgery [id number]       Immunosuppression (H) 01/28/2020     Priority: Medium     Added automatically from request for surgery [id number]       STORMY (obstructive sleep apnea) 01/27/2020     Priority: Medium     1/2019 (241#)-AHI 24, lowest oxygen saturation was 86%, no periodic limb movement were noted, CPAP 8 cm/H20 was effective.       Secondary lymphedema 01/27/2020     Priority: Medium     Chronic neutrophilia 01/27/2020     Priority: Medium     Cervical high risk HPV (human papillomavirus) test positive 12/13/2019     Priority: Medium     2011 NIL pap.  2015 NIL pap, Neg HPV.  12/13/19 NIL pap , + HR HPV 16. Plan colp.   1/6/19 Failed colp exam secondary to anatomic constraints with gyn. Referred to gyn/onc.   2/25/20 COLP and ECC- Negative for dysplasia. Plan cotest in 1 year.   8/20/2021 NIL pap, Neg HPV. Plan cotest in 1 year.        Immunosuppressed status (H) 09/05/2019     Priority: Medium     Ulcerative colitis with complication, unspecified location (H) 09/05/2019     Priority: Medium     Morbid obesity (H) 09/05/2019     Priority: Medium     Arthritis, rheumatoid (H) 11/26/2018     Priority: Medium     Hypocalcemia 05/15/2018     Priority: Medium     Anemia 05/14/2018     Priority: Medium     Menstrual irregularity 05/14/2018     Priority: Medium     Arthralgia of both knees 05/12/2018      Priority: Medium     Formatting of this note might be different from the original.  Work-up in progress. There is concern for inflammatory arthritis.       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     Priority: Medium     Malignant melanoma of left upper extremity including shoulder (H) 10/12/2017     Priority: Medium     Metastatic malignant melanoma (H) 10/10/2017     Priority: Medium     Prothrombin mutation (H) 04/05/2017     Priority: Medium     On daily aspirin 81 mg per hematology's recommendations from 2008       Vision changes 04/01/2017     Priority: Medium     Mild intermittent asthma without complication 11/08/2013     Priority: Medium     Moderate major depression (H) 06/24/2013     Priority: Medium     Anxiety state 09/13/2012     Priority: Medium     Problem list name updated by automated process. Provider to review       Esophageal reflux 09/13/2012     Priority: Medium     DUB (dysfunctional uterine bleeding) 07/28/2011     Priority: Medium     CARDIOVASCULAR SCREENING; LDL GOAL LESS THAN 160 07/28/2011     Priority: Low        Past Medical History:    Past Medical History:   Diagnosis Date     Abnormal MRI      Anxiety      Basal cell carcinoma      Cervical high risk HPV (human papillomavirus) test positive 12/13/2019     Colitis      Depression      Diabetes mellitus, iatrogenic (H) 1/28/2020     Esophageal reflux      Inflammatory arthritis      Insomnia       Intestinal giardiasis 3/5/2018     Lumbago      Lymphedema      Malignant melanoma (H)      Melanoma (H) 10/23/2017     Migraines      Mild persistent asthma      Morbid obesity with BMI of 40.0-44.9, adult (H)      STORMY (obstructive sleep apnea)      Prothrombin deficiency (H)      Stroke (cerebrum) (H)      TIA (transient ischemic attack)      Type 2 diabetes mellitus (H)        Past Surgical History:    Past Surgical History:   Procedure Laterality Date     APPENDECTOMY       COLONOSCOPY N/A 10/18/2017    Procedure: COLONOSCOPY;  Colon;  Surgeon: Debbie Stephens MD;  [facility]     COLONOSCOPY N/A 3/9/2018    Procedure: COMBINED COLONOSCOPY, SINGLE OR MULTIPLE BIOPSY/POLYPECTOMY BY BIOPSY;  colon;  Surgeon: Benita Schumacher MD;  Location: [facility]     COLONOSCOPY      multiple since  to present - about 6 total     DISSECT LYMPH NODE AXILLA Left 10/23/2017    Procedure: DISSECT LYMPH NODE AXILLA;  Left Axillary Lymph Node Dissection ;  Surgeon: Laurent Cool MD;  Location: [facility]     EXAM UNDER ANESTHESIA PELVIC N/A 2020    Procedure: EXAM UNDER ANESTHESIA, PELVIS; with Cervical Biopsies, Vaginal Biopsy and Endocervical Curettings;  Surgeon: Melina Jung MD;  Location: [facility] OR     GYN SURGERY  ,          LAPAROSCOPIC ASSISTED COLECTOMY N/A 6/15/2021    Procedure: laparoscopic total abdominal colectomy, end ileostomy;  Surgeon: Quinton Ram MD;  Location: [facility] OR     REPAIR MOHS Left 2017    Procedure: REPAIR MOHS;  Left Upper Lid Moh's Reconstruction;  Surgeon: Kisha Bosch MD;  Location: [facility] OR       Family History:    Family History   Problem Relation Age of Onset     Cancer Mother 45        lung     Neurologic Disorder Mother         epilepsy     Lipids Father      Gastrointestinal Disease Father         diverticulitis      Depression Father      Colitis Father      Colon Cancer Father      Diverticulitis Father      Cancer Maternal Grandmother       Blood Disease Maternal Grandmother         lymphoma      Arthritis Maternal Grandmother      Diabetes Maternal Grandmother      Depression Maternal Grandmother      Macular Degeneration Maternal Grandmother      Glaucoma Maternal Grandmother      Diabetes Maternal Grandfather      Cerebrovascular Disease Maternal Grandfather      Blood Disease Maternal Grandfather      Heart Disease Maternal Grandfather      Glaucoma Maternal Grandfather      Cancer Paternal Grandmother      Cancer - colorectal Paternal Grandmother      Colitis Paternal Grandmother      Colon Cancer Paternal Grandmother      Diverticulitis Paternal Grandmother      Respiratory Paternal Grandfather         emphysema      Colitis Paternal Grandfather      Colon Cancer Paternal Grandfather      Diverticulitis Paternal Grandfather      Heart Disease Daughter      Asthma Daughter      Depression Sister      Melanoma No family hx of        Social History:  Marital Status:   [4]  Social History     Tobacco Use     Smoking status: Former Smoker     Packs/day: 1.00     Years: 5.00     Pack years: 5.00     Quit date: 3/20/1998     Years since quittin.8     Smokeless tobacco: Never Used   Vaping Use     Vaping Use: Never used   Substance Use Topics     Alcohol use: Yes     Comment: occ     Drug use: No        Medications:    oxyCODONE (ROXICODONE) 5 MG tablet  acetaminophen (TYLENOL) 325 MG tablet  Alcohol Swabs (ALCOHOL PREP) 70 % PADS  amLODIPine (NORVASC) 5 MG tablet  blood glucose (NO BRAND SPECIFIED) lancets standard  blood glucose (NO BRAND SPECIFIED) test strip  Calcium Carb-Cholecalciferol 600-800 MG-UNIT TABS  calcium carbonate (TUMS) 500 MG chewable tablet  cyanocobalamin (VITAMIN B-12) 1000 MCG tablet  gabapentin (NEURONTIN) 600 MG tablet  hydrOXYzine (ATARAX) 25 MG tablet  insulin glargine (LANTUS PEN) 100 UNIT/ML pen  insulin lispro (HUMALOG KWIKPEN) 100 UNIT/ML (1 unit dial) KWIKPEN  loperamide (IMODIUM A-D) 2 MG tablet  loperamide  "(IMODIUM A-D) 2 MG tablet  metFORMIN (GLUCOPHAGE) 500 MG tablet  omeprazole (PRILOSEC) 40 MG DR capsule  Ostomy Supplies MISC  pantoprazole (PROTONIX) 40 MG EC tablet  predniSONE (DELTASONE) 1 MG tablet  predniSONE (DELTASONE) 5 MG tablet  rosuvastatin (CRESTOR) 10 MG tablet  simethicone (MYLICON) 80 MG chewable tablet  triamcinolone (KENALOG) 0.1 % paste  venlafaxine (EFFEXOR) 75 MG tablet  VITAMIN D3 25 MCG (1000 UT) tablet  VITRON-C  MG TABS tablet          Review of Systems  Pertinent positives and negatives listed in the HPI, all other systems reviewed and are negative.    Physical Exam   BP: 120/79  Pulse: 109  Temp: 100  F (37.8  C)  Resp: 20  Height: 160 cm (5' 3\")  Weight: 108.9 kg (240 lb)  SpO2: 96 %      Physical Exam  Vitals and nursing note reviewed.   Constitutional:       General: She is in acute distress.      Appearance: She is well-developed. She is not diaphoretic.   HENT:      Head: Normocephalic and atraumatic.      Right Ear: Tympanic membrane and external ear normal.      Left Ear: Tympanic membrane and external ear normal.      Nose: Nose normal.      Mouth/Throat:      Mouth: Mucous membranes are dry.   Eyes:      General: No scleral icterus.     Conjunctiva/sclera: Conjunctivae normal.   Cardiovascular:      Rate and Rhythm: Regular rhythm. Tachycardia present.      Heart sounds: No murmur heard.      Pulmonary:      Effort: Pulmonary effort is normal. No respiratory distress.      Breath sounds: No stridor.   Abdominal:      General: There is no distension.      Palpations: Abdomen is soft.      Tenderness: There is no abdominal tenderness. There is no guarding or rebound.      Comments: Stoma in the left lower quadrant with loose stool, brown stool, no blood   Musculoskeletal:      Cervical back: Normal range of motion.   Skin:     General: Skin is warm and dry.   Neurological:      Mental Status: She is alert and oriented to person, place, and time.   Psychiatric:         " Behavior: Behavior normal.         ED Course                 Procedures              Critical Care time:  none               Results for orders placed or performed during the hospital encounter of 01/06/22 (from the past 24 hour(s))   CBC with platelets differential    Narrative    The following orders were created for panel order CBC with platelets differential.  Procedure                               Abnormality         Status                     ---------                               -----------         ------                     CBC with platelets and d...[[id number]]                      Final result                 Please view results for these tests on the individual orders.   Comprehensive metabolic panel   Result Value Ref Range    Sodium 137 133 - 144 mmol/L    Potassium 3.3 (L) 3.4 - 5.3 mmol/L    Chloride 102 94 - 109 mmol/L    Carbon Dioxide (CO2) 25 20 - 32 mmol/L    Anion Gap 10 3 - 14 mmol/L    Urea Nitrogen 13 7 - 30 mg/dL    Creatinine 0.96 0.52 - 1.04 mg/dL    Calcium 8.9 8.5 - 10.1 mg/dL    Glucose 114 (H) 70 - 99 mg/dL    Alkaline Phosphatase 83 40 - 150 U/L    AST 44 0 - 45 U/L    ALT 65 (H) 0 - 50 U/L    Protein Total 7.5 6.8 - 8.8 g/dL    Albumin 2.9 (L) 3.4 - 5.0 g/dL    Bilirubin Total 0.2 0.2 - 1.3 mg/dL    GFR Estimate 74 >60 mL/min/1.73m2   Magnesium   Result Value Ref Range    Magnesium 2.1 1.6 - 2.3 mg/dL   Lactic acid whole blood   Result Value Ref Range    Lactic Acid 1.1 0.7 - 2.0 mmol/L   Blood gas venous   Result Value Ref Range    pH Venous 7.40 7.32 - 7.43    pCO2 Venous 44 40 - 50 mm Hg    pO2 Venous 26 25 - 47 mm Hg    Bicarbonate Venous 27 21 - 28 mmol/L    Base Excess/Deficit (+/-) 2.2 (H) -7.7 - 1.9 mmol/L    FIO2 0    Troponin I   Result Value Ref Range    Troponin I High Sensitivity 6 <54 ng/L   Ferritin   Result Value Ref Range    Ferritin 210 8 - 252 ng/mL   CRP inflammation   Result Value Ref Range    CRP Inflammation 88.1 (H) 0.0 - 8.0 mg/L   D dimer quantitative    Result Value Ref Range    D-Dimer Quantitative 0.73 (H) 0.00 - 0.50 ug/mL FEU    Narrative    This D-dimer assay is intended for use in conjunction with a clinical pretest probability assessment model to exclude pulmonary embolism (PE) and deep venous thrombosis (DVT) in outpatients suspected of PE or DVT. The cut-off value is 0.50 ug/mL FEU.   CBC with platelets and differential   Result Value Ref Range    WBC Count 7.8 4.0 - 11.0 10e3/uL    RBC Count 4.26 3.80 - 5.20 10e6/uL    Hemoglobin 11.9 11.7 - 15.7 g/dL    Hematocrit 36.6 35.0 - 47.0 %    MCV 86 78 - 100 fL    MCH 27.9 26.5 - 33.0 pg    MCHC 32.5 31.5 - 36.5 g/dL    RDW 14.5 10.0 - 15.0 %    Platelet Count 256 150 - 450 10e3/uL    % Neutrophils 62 %    % Lymphocytes 26 %    % Monocytes 10 %    % Eosinophils 1 %    % Basophils 0 %    % Immature Granulocytes 1 %    NRBCs per 100 WBC 0 <1 /100    Absolute Neutrophils 4.8 1.6 - 8.3 10e3/uL    Absolute Lymphocytes 2.0 0.8 - 5.3 10e3/uL    Absolute Monocytes 0.8 0.0 - 1.3 10e3/uL    Absolute Eosinophils 0.1 0.0 - 0.7 10e3/uL    Absolute Basophils 0.0 0.0 - 0.2 10e3/uL    Absolute Immature Granulocytes 0.1 <=0.4 10e3/uL    Absolute NRBCs 0.0 10e3/uL   Extra Tube    Narrative    The following orders were created for panel order Extra Tube.  Procedure                               Abnormality         Status                     ---------                               -----------         ------                     Extra Green Top (Lithium...[[id number]]                      Final result                 Please view results for these tests on the individual orders.   Extra Green Top (Lithium Heparin) Tube   Result Value Ref Range    Hold Specimen JI    Symptomatic; Yes; 1/2/2022 Influenza A/B & SARS-CoV2 (COVID-19) Virus PCR Multiplex Nasopharyngeal    Specimen: Nasopharyngeal; Swab   Result Value Ref Range    Influenza A PCR Negative Negative    Influenza B PCR Negative Negative    SARS CoV2 PCR Positive (A) Negative     Narrative    Testing was performed using the whitney SARS-CoV-2 & Influenza A/B Assay on the whitney Ana System. This test should be ordered for the detection of SARS-CoV-2 and influenza viruses in individuals who meet clinical and/or epidemiological criteria. Test performance is unknown in asymptomatic patients. This test is for in vitro diagnostic use under the FDA EUA for laboratories certified under CLIA to perform moderate and/or high complexity testing. This test has not been FDA cleared or approved. A negative result does not rule out the presence of PCR inhibitors in the specimen or target RNA in concentration below the limit of detection for the assay. If only one viral target is positive but coinfection with multiple targets is suspected, the sample should be re-tested with another FDA cleared, approved or authorized test, if coinfection would change clinical management. [facility] are certified under the Clinical Laboratory Improvement Amendments of 1988 (CLIA-88) as  qualified to perform moderate and/or high complexity laboratory testing.   XR Chest Port 1 View    Narrative    EXAM: XR CHEST PORT 1 VIEW  LOCATION: [facility]  DATE/TIME: 1/6/2022 11:42 PM    INDICATION: Dyspnea/SOB  COMPARISON: 04/13/2021      Impression    IMPRESSION: Slight scattered interstitial prominence in the lungs but no focal acute appearing infiltrates or consolidation. Normal heart size and pulmonary vascularity. Surgical clips left axilla. Chest is otherwise negative.   CT Chest (PE) Abdomen Pelvis w Contrast    Narrative    EXAM: CT CHEST PE ABDOMEN PELVIS W CONTRAST  LOCATION: [facility]  DATE/TIME: 1/7/2022 1:41 AM    INDICATION: Fever, shortness of breath, abdominal pain  COMPARISON: 6/3/2021 and 5/6/2021  TECHNIQUE: CT chest pulmonary angiogram and routine CT abdomen pelvis with IV contrast. Arterial phase through the chest and venous phase  through the abdomen and pelvis. Multiplanar reformats and MIP reconstructions were performed. Dose reduction   techniques were used.   CONTRAST: 91 mL Isovue-370    FINDINGS:  ANGIOGRAM CHEST: Pulmonary arteries are normal caliber and negative for pulmonary emboli. Thoracic aorta is negative for dissection. No CT evidence of right heart strain.     LUNGS AND PLEURA: Mild patchy groundglass infiltrates in the lungs bilaterally with an appearance and distribution suggestive of COVID 19 pneumonitis. No significant pleural effusions.    MEDIASTINUM/AXILLAE: There are few scattered bilateral hilar or mediastinal lymph nodes are likely reactive in nature. Normal heart size. Trace amount pericardial fluid. Normal caliber thoracic aorta. Esophagus is grossly negative. Benign-appearing   postoperative scarring left axilla.    CORONARY ARTERY CALCIFICATION: None.    HEPATOBILIARY: Diffuse fatty infiltration of the liver. No calcified gallstones or biliary dilatation.    PANCREAS: Generalized pancreatic atrophy, especially in the distal body and tail. This appearance is unchanged. Pancreas is otherwise negative.    SPLEEN: Normal.    ADRENAL GLANDS: Normal.    KIDNEYS/BLADDER: Normal.    BOWEL: Postoperative changes of subtotal colectomy with Mejía pouch and right upper quadrant ileostomy. Bowel is normal in caliber with no evidence for obstruction. Moderate sized fat-containing parastomal hernia. There is some mild diffuse low-density   wall thickening in the Mejía pouch which may suggest a nonspecific proctocolitis. Clinical correlation.    LYMPH NODES: Normal.    VASCULATURE: Unremarkable.    PELVIC ORGANS: Normal.    MUSCULOSKELETAL: Mild degenerative changes in the spine.      Impression    IMPRESSION:  1.  Negative for pulmonary embolism.    2.  Patchy groundglass infiltrates in the lungs bilaterally most suggestive of COVID-19 pneumonitis.    3.  Postoperative changes of subtotal colectomy with Mejía pouch and  right upper quadrant ileostomy. There is some diffuse low-density wall thickening in the wall of the Mejía pouch which could indicate a nonspecific proctocolitis. Clinical   correlation.    4.  Bowel elsewhere is unremarkable. No evidence for obstruction.    5.  Marked pancreatic atrophy, unchanged.       Medications   sodium chloride (PF) 0.9% PF flush 3 mL (has no administration in time range)   sodium chloride (PF) 0.9% PF flush 3 mL (3 mLs Intracatheter Given 1/6/22 2330)   oxyCODONE (ROXICODONE) tablet 5 mg (has no administration in time range)   acetaminophen (TYLENOL) tablet 1,000 mg (has no administration in time range)   acetaminophen (TYLENOL) tablet 1,000 mg (1,000 mg Oral Given 1/6/22 2048)   lactated ringers BOLUS 1,000 mL (0 mLs Intravenous Stopped 1/7/22 0125)   oxyCODONE (ROXICODONE) tablet 10 mg (10 mg Oral Given 1/6/22 2309)   iopamidol (ISOVUE-370) solution 91 mL (91 mLs Intravenous Given 1/7/22 0143)   sodium chloride 0.9 % bag 500mL for CT scan flush use (100 mLs Intravenous Given 1/7/22 0142)       Assessments & Plan (with Medical Decision Making)   46-year-old female with a history of history of malignant melanoma of the left upper extremity was treated with immunomodulation which unfortunately caused ulcerative colitis and rheumatoid arthritis who is now on chronic prednisone and insulin for diabetes who is vaccinated and boosted against COVID19 who presents with known COVID-19 infection.  Blood pressure is 103/69, temperature 37.3  C, heart 96, SPO2 is 95% on room air.  Lactic acid is normal which is reassuring and white blood cell count is normal.  Electrolytes are within normal limits.  Chest x-ray and CT of the chest, abdomen, pelvis obtained, all images reviewed independently as well as radiology reads reviewed, no signs of pulmonary embolism or bowel obstruction.  She does have infiltrates within both lungs consistent with COVID-19 pneumonia.  Her COVID-19 swab is positive here.  She  is remained well-appearing here with normal oxygen saturations on room air and she is feeling better after IV fluids.  She was given some oxycodone for pain as well as acetaminophen.  At this point I do believe that the patient is safe to discharge home.  I did call and discussed this plan with the patient's rheumatology team at [facility].  They were in agreement with my above plan and did not think that monoclonal antibodies were necessary at this time.  The patient is discharged with pulse oximeter and home monitoring instructions to return if she has worsening of her symptoms or other concerns, otherwise follow-up in clinic.  The patient is in agreement with this plan    I have reviewed the nursing notes.    I have reviewed the findings, diagnosis, plan and need for follow up with the patient.       New Prescriptions    OXYCODONE (ROXICODONE) 5 MG TABLET    Take 1 tablet (5 mg) by mouth every 6 hours as needed for severe pain       Final diagnoses:   Pneumonia due to 2019 novel coronavirus       1/6/2022   [facility] EMERGENCY DEPT     Steve Cordova MD  01/07/22 0338

## 2022-01-07 NOTE — PROGRESS NOTES
Clinic Care Coordination Contact    Care Coordination ED Discharge Follow up Note    Patient referred for Virtual Home Monitoring Program for COVID-19.     Called and left message for patient encouraging them to schedule virtual visit with PCP and to watch for invitation from [employer]. Phone number to reach both [facility] primary scheduling and Nurse Advisor line ([phone]) reviewed on message.      Elaine Walker RN  [facility]  - Clinic Care Coordinator

## 2022-01-07 NOTE — DISCHARGE INSTRUCTIONS
Continue your home medications.  Return to the emergency department for worsening symptoms, difficulty breathing, repeated vomiting, lightheadedness, or other concerns.  Touch base with your clinic coordinator in the morning.

## 2022-01-08 ENCOUNTER — NURSE TRIAGE (OUTPATIENT)
Dept: CARE COORDINATION | Facility: CLINIC | Age: 47
End: 2022-01-08
Payer: COMMERCIAL

## 2022-01-08 NOTE — TELEPHONE ENCOUNTER
"Nurse Triage SBAR    Is this a 2nd Level Triage? NO    Situation: Received notification of patient's report of worsening shortness of breath via [employer].       Background  : COVID-19 pneumonia    Assessment : see assessment      Protocol Recommended Disposition: Telephone advice. Will send patient information via [employer] Loop for home treatment measures for management of cough, worrisome signs/symptoms that require urgent medical evaluation and 24/7 [facility] Nurse Advisors phone number should patient have questions or concerns after hours. Patient verbalizes agreement with plan.        Answer Assessment - Initial Assessment Questions  1. COVID-19 ONSET: \"When did the symptoms of COVID-19 first start?\"      1-1-2022  2. DIAGNOSIS CONFIRMATION: \"How were you diagnosed?\" (e.g., COVID-19 oral or nasal viral test; COVID-19 antibody test; doctor visit)      Not asked  3. MAIN SYMPTOM:  \"What is your main concern or symptom right now?\" (e.g., breathing difficulty, cough, fatigue. loss of smell)      cough  4. SYMPTOM ONSET: \"When did the  cough  start?\"      Worse this morning. Thought she was feeling better yesterday. Diagnosed with COVID-19 pneumonia 1-6  5. BETTER-SAME-WORSE: \"Are you getting better, staying the same, or getting worse over the last 1 to 2 weeks?\"      As above  6. RECENT MEDICAL VISIT: \"Have you been seen by a healthcare provider (doctor, NP, PA) for these persisting COVID-19 symptoms?\" If Yes, ask: \"When were you seen?\" (e.g., date)      As above  7. COUGH: \"Do you have a cough?\" If Yes, ask: \"How bad is the cough?\"        Harsh, intermittent. Chest feels heavy today  8. FEVER: \"Do you have a fever?\" If Yes, ask: \"What is your temperature, how was it measured, and when did it start?\"      Not measured today or yesterday but reported spiking high fevers of 103 at night to ED provider  9. BREATHING DIFFICULTY: \"Are you having any trouble breathing?\" If Yes, ask: \"How bad is your breathing?\" " "(e.g., mild, moderate, severe)     - MILD: No SOB at rest, mild SOB with walking, speaks normally in sentences, can lay down, no retractions, pulse < 100.     - MODERATE: SOB at rest, SOB with minimal exertion and prefers to sit, cannot lie down flat, speaks in phrases, mild retractions, audible wheezing, pulse 100-120.     - SEVERE: Very SOB at rest, speaks in single words, struggling to breathe, sitting hunched forward, retractions, pulse > 120        Mild with coughing spell  10. HIGH RISK DISEASE: \"Do you have any chronic medical problems?\" (e.g., asthma, heart or lung disease, weak immune system, obesity, etc.)        Yes; ulcerative colitis, malignant melanoma of arm, arthritis; on chronic prednisone  11. PREGNANCY: \"Is there any chance you are pregnant?\" \"When was your last menstrual period?\"        none  12. OTHER SYMPTOMS: \"Do you have any other symptoms?\"  (e.g., fatigue, headache, muscle pain, weakness)        Muscle pain (\"all over body pain\"), fatigue    Protocols used: CORONAVIRUS (COVID-19) PERSISTING SYMPTOMS FOLLOW-UP CALL-A- 8.25.2021      "

## 2022-01-09 ENCOUNTER — HEALTH MAINTENANCE LETTER (OUTPATIENT)
Age: 47
End: 2022-01-09

## 2022-01-12 LAB
BACTERIA BLD CULT: NO GROWTH
BACTERIA BLD CULT: NO GROWTH

## 2022-02-07 ENCOUNTER — LAB (OUTPATIENT)
Dept: LAB | Facility: CLINIC | Age: 47
End: 2022-02-07
Attending: INTERNAL MEDICINE
Payer: COMMERCIAL

## 2022-02-07 ENCOUNTER — OFFICE VISIT (OUTPATIENT)
Dept: RHEUMATOLOGY | Facility: CLINIC | Age: 47
End: 2022-02-07
Attending: INTERNAL MEDICINE
Payer: COMMERCIAL

## 2022-02-07 VITALS
WEIGHT: 245.5 LBS | HEART RATE: 86 BPM | HEIGHT: 63 IN | DIASTOLIC BLOOD PRESSURE: 80 MMHG | BODY MASS INDEX: 43.5 KG/M2 | OXYGEN SATURATION: 97 % | SYSTOLIC BLOOD PRESSURE: 114 MMHG | RESPIRATION RATE: 22 BRPM | TEMPERATURE: 98.1 F

## 2022-02-07 DIAGNOSIS — R73.09 ELEVATED GLUCOSE: ICD-10-CM

## 2022-02-07 DIAGNOSIS — K51.919 ULCERATIVE COLITIS WITH COMPLICATION, UNSPECIFIED LOCATION (H): ICD-10-CM

## 2022-02-07 DIAGNOSIS — E27.1 ADRENAL INSUFFICIENCY (ADDISON'S DISEASE) (H): ICD-10-CM

## 2022-02-07 DIAGNOSIS — M25.50 POLYARTHRALGIA: ICD-10-CM

## 2022-02-07 DIAGNOSIS — Z23 NEED FOR INFLUENZA VACCINATION: Primary | ICD-10-CM

## 2022-02-07 LAB
ALBUMIN SERPL-MCNC: 3.8 G/DL (ref 3.4–5)
ALP SERPL-CCNC: 71 U/L (ref 40–150)
ALT SERPL W P-5'-P-CCNC: 36 U/L (ref 0–50)
ANION GAP SERPL CALCULATED.3IONS-SCNC: 7 MMOL/L (ref 3–14)
AST SERPL W P-5'-P-CCNC: 17 U/L (ref 0–45)
BASOPHILS # BLD AUTO: 0.1 10E3/UL (ref 0–0.2)
BASOPHILS NFR BLD AUTO: 1 %
BILIRUB DIRECT SERPL-MCNC: <0.1 MG/DL (ref 0–0.2)
BILIRUB SERPL-MCNC: 0.2 MG/DL (ref 0.2–1.3)
BUN SERPL-MCNC: 11 MG/DL (ref 7–30)
CALCIUM SERPL-MCNC: 9.3 MG/DL (ref 8.5–10.1)
CHLORIDE BLD-SCNC: 106 MMOL/L (ref 94–109)
CO2 SERPL-SCNC: 25 MMOL/L (ref 20–32)
CREAT SERPL-MCNC: 0.75 MG/DL (ref 0.52–1.04)
CRP SERPL-MCNC: 18.8 MG/L (ref 0–8)
EOSINOPHIL # BLD AUTO: 0.2 10E3/UL (ref 0–0.7)
EOSINOPHIL NFR BLD AUTO: 2 %
ERYTHROCYTE [DISTWIDTH] IN BLOOD BY AUTOMATED COUNT: 15.7 % (ref 10–15)
ERYTHROCYTE [SEDIMENTATION RATE] IN BLOOD BY WESTERGREN METHOD: 17 MM/HR (ref 0–20)
GFR SERPL CREATININE-BSD FRML MDRD: >90 ML/MIN/1.73M2
GLUCOSE BLD-MCNC: 113 MG/DL (ref 70–99)
HBA1C MFR BLD: 7 % (ref 0–5.6)
HCT VFR BLD AUTO: 38.4 % (ref 35–47)
HGB BLD-MCNC: 12.1 G/DL (ref 11.7–15.7)
IMM GRANULOCYTES # BLD: 0.2 10E3/UL
IMM GRANULOCYTES NFR BLD: 2 %
LYMPHOCYTES # BLD AUTO: 1.6 10E3/UL (ref 0.8–5.3)
LYMPHOCYTES NFR BLD AUTO: 13 %
MCH RBC QN AUTO: 28.3 PG (ref 26.5–33)
MCHC RBC AUTO-ENTMCNC: 31.5 G/DL (ref 31.5–36.5)
MCV RBC AUTO: 90 FL (ref 78–100)
MONOCYTES # BLD AUTO: 0.6 10E3/UL (ref 0–1.3)
MONOCYTES NFR BLD AUTO: 5 %
NEUTROPHILS # BLD AUTO: 9.3 10E3/UL (ref 1.6–8.3)
NEUTROPHILS NFR BLD AUTO: 77 %
NRBC # BLD AUTO: 0 10E3/UL
NRBC BLD AUTO-RTO: 0 /100
PLATELET # BLD AUTO: 311 10E3/UL (ref 150–450)
POTASSIUM BLD-SCNC: 4.2 MMOL/L (ref 3.4–5.3)
PROT SERPL-MCNC: 7.5 G/DL (ref 6.8–8.8)
RBC # BLD AUTO: 4.28 10E6/UL (ref 3.8–5.2)
SODIUM SERPL-SCNC: 138 MMOL/L (ref 133–144)
WBC # BLD AUTO: 12.1 10E3/UL (ref 4–11)

## 2022-02-07 PROCEDURE — 250N000011 HC RX IP 250 OP 636: Performed by: INTERNAL MEDICINE

## 2022-02-07 PROCEDURE — 90686 IIV4 VACC NO PRSV 0.5 ML IM: CPT | Performed by: INTERNAL MEDICINE

## 2022-02-07 PROCEDURE — 36415 COLL VENOUS BLD VENIPUNCTURE: CPT | Performed by: PATHOLOGY

## 2022-02-07 PROCEDURE — 99214 OFFICE O/P EST MOD 30 MIN: CPT | Performed by: INTERNAL MEDICINE

## 2022-02-07 PROCEDURE — G0008 ADMIN INFLUENZA VIRUS VAC: HCPCS | Performed by: INTERNAL MEDICINE

## 2022-02-07 PROCEDURE — G0463 HOSPITAL OUTPT CLINIC VISIT: HCPCS | Mod: 25

## 2022-02-07 PROCEDURE — 82248 BILIRUBIN DIRECT: CPT | Performed by: PATHOLOGY

## 2022-02-07 PROCEDURE — 85025 COMPLETE CBC W/AUTO DIFF WBC: CPT | Performed by: PATHOLOGY

## 2022-02-07 RX ADMIN — INFLUENZA A VIRUS A/VICTORIA/2570/2019 IVR-215 (H1N1) ANTIGEN (FORMALDEHYDE INACTIVATED), INFLUENZA A VIRUS A/TASMANIA/503/2020 IVR-221 (H3N2) ANTIGEN (FORMALDEHYDE INACTIVATED), INFLUENZA B VIRUS B/PHUKET/3073/2013 ANTIGEN (FORMALDEHYDE INACTIVATED), AND INFLUENZA B VIRUS B/WASHINGTON/02/2019 ANTIGEN (FORMALDEHYDE INACTIVATED) 0.5 ML: 15; 15; 15; 15 INJECTION, SUSPENSION INTRAMUSCULAR at 12:22

## 2022-02-07 ASSESSMENT — MIFFLIN-ST. JEOR: SCORE: 1727.58

## 2022-02-07 ASSESSMENT — PAIN SCALES - GENERAL: PAINLEVEL: MODERATE PAIN (5)

## 2022-02-07 NOTE — NURSING NOTE
"Chief Complaint   Patient presents with     RECHECK     RA     Vital signs:  Temp: 98.1  F (36.7  C) Temp src: Oral BP: 114/80 Pulse: 86   Resp: 22 SpO2: 97 %     Height: 160 cm (5' 2.99\") Weight: 111.4 kg (245 lb 8 oz)  Estimated body mass index is 43.5 kg/m  as calculated from the following:    Height as of this encounter: 1.6 m (5' 2.99\").    Weight as of this encounter: 111.4 kg (245 lb 8 oz).        Halley Romo, [facility]  2/7/2022 11:21 AM      "

## 2022-02-07 NOTE — LETTER
2/7/2022      RE: Doretha Yu  [address]  [facility] [address]       Rheumatology consultation note    CC: Follow-up for chronic pain syndrome    HPI: Doretha is 46 years old.  On January 6, 2022 she was diagnosed with Covid and she stayed overnight in the emergency room due to breathing difficulties and Covid pneumonia.  She has recovered completely without any major residual symptoms.  She was able to taper down her prednisone to 8 mg daily.  During her Covid this was not increased.  She is determined to taper off of prednisone and lose weight so that she can have reversal of her colostomy.  She also is trying to reduce the number of medications that she is taking.  She has noticed some swelling in her left arm and hand, and this is reminiscent of some lymphedema she has had in the past.     Brief Past Medical History: she was diagnosed with a metastatic melanoma with an unknown primary, successfully with nivolumab, amongst other things, and she is considered to be cured from her melanoma.  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  She has also developed inflammatory arthritis and she was started on several medications including etanercept, adalimumab, tocilizumab, that did not really seem to be of much benefit.  Because of her arthritis she was started on prednisone and has had major difficulty to tapering the prednisone. She is gained significant amount of weight and she currently has diabetes, obesity, obstructive sleep apnea.  She is using CPAP.    Physical examination  I reviewed her vital signs they are normal and her BMI is 43.5  She has some puffiness at the dorsum of her left hand as compared to the right hand, but no evidence for synovitis.    Impression/plan  1.  Prednisone taper.  Because of previous failures to taper off the prednisone I have submitted a referral to endocrine for evaluation for  adrenal insufficiency.  In the meantime she will plan to taper 1 mg every 3 to 4 weeks.  2.  The plan is to reevaluate her CPAP settings and she has an appointment.  3.  She has fibromyalgia-like symptoms that hopefully will improve with tapering of the prednisone, the adjustment of her CPAP, increase exercise and weight loss, all in preparation for a successful reversal of her colostomy.  4.  Plan for follow-up in 3 months.    Addendum  I reviewed today's blood work that was essentially reassuring.  It shows essentially normal chemistries, CRP of 18.8, which is probably acceptable given her BMI, and it has come down nicely from 88 on 6 January when she had Covid.  Her CBC showed a white count of 12 and an ESR of 17.    I spent 30 minutes face to face with the patient, with 25 minutes on counseling and coordination of care.      Don Hopper MD

## 2022-02-07 NOTE — LETTER
2/7/2022       RE: Doretha Yu  [address]  [facility] [address]     Dear Colleague,    Thank you for referring your patient, Doretha Yu, to the [facility] [place] at [facility]. Please see a copy of my visit note below.    Rheumatology consultation note    CC: Follow-up for chronic pain syndrome    HPI: Doretha is 46 years old.  On January 6, 2022 she was diagnosed with Covid and she stayed overnight in the emergency room due to breathing difficulties and Covid pneumonia.  She has recovered completely without any major residual symptoms.  She was able to taper down her prednisone to 8 mg daily.  During her Covid this was not increased.  She is determined to taper off of prednisone and lose weight so that she can have reversal of her colostomy.  She also is trying to reduce the number of medications that she is taking.  She has noticed some swelling in her left arm and hand, and this is reminiscent of some lymphedema she has had in the past.     Brief Past Medical History: she was diagnosed with a metastatic melanoma with an unknown primary, successfully with nivolumab, amongst other things, and she is considered to be cured from her melanoma.  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  She has also developed inflammatory arthritis and she was started on several medications including etanercept, adalimumab, tocilizumab, that did not really seem to be of much benefit.  Because of her arthritis she was started on prednisone and has had major difficulty to tapering the prednisone. She is gained significant amount of weight and she currently has diabetes, obesity, obstructive sleep apnea.  She is using CPAP.    Physical examination  I reviewed her vital signs they are normal and her BMI is 43.5  She has some puffiness at the dorsum of her  left hand as compared to the right hand, but no evidence for synovitis.    Impression/plan  1.  Prednisone taper.  Because of previous failures to taper off the prednisone I have submitted a referral to endocrine for evaluation for adrenal insufficiency.  In the meantime she will plan to taper 1 mg every 3 to 4 weeks.  2.  The plan is to reevaluate her CPAP settings and she has an appointment.  3.  She has fibromyalgia-like symptoms that hopefully will improve with tapering of the prednisone, the adjustment of her CPAP, increase exercise and weight loss, all in preparation for a successful reversal of her colostomy.  4.  Plan for follow-up in 3 months.    Addendum  I reviewed today's blood work that was essentially reassuring.  It shows essentially normal chemistries, CRP of 18.8, which is probably acceptable given her BMI, and it has come down nicely from 88 on 6 January when she had Covid.  Her CBC showed a white count of 12 and an ESR of 17.    I spent 30 minutes face to face with the patient, with 25 minutes on counseling and coordination of care.      Again, thank you for allowing me to participate in the care of your patient.      Sincerely,    Don Hopper MD

## 2022-02-08 ENCOUNTER — TELEPHONE (OUTPATIENT)
Dept: WOUND CARE | Facility: CLINIC | Age: 47
End: 2022-02-08
Payer: COMMERCIAL

## 2022-02-09 DIAGNOSIS — F32.0 MILD MAJOR DEPRESSION (H): ICD-10-CM

## 2022-02-09 DIAGNOSIS — F41.1 ANXIETY STATE: ICD-10-CM

## 2022-02-09 RX ORDER — VENLAFAXINE 75 MG/1
75 TABLET ORAL 3 TIMES DAILY
Qty: 90 TABLET | Refills: 2 | Status: SHIPPED | OUTPATIENT
Start: 2022-02-09 | End: 2022-05-18

## 2022-02-09 NOTE — TELEPHONE ENCOUNTER
"Requested Prescriptions   Pending Prescriptions Disp Refills     venlafaxine (EFFEXOR) 75 MG tablet [Pharmacy Med Name: VENLAFAXINE HCL 75MG TABS] 90 tablet 2     Sig: TAKE 1 TABLET (75 MG) BY MOUTH 3 TIMES DAILY       Serotonin-Norepinephrine Reuptake Inhibitors  Failed - 2/9/2022 11:23 AM        Failed - PHQ-9 score of less than 5 in past 6 months     Please review last PHQ-9 score.           Passed - Blood pressure under 140/90 in past 12 months     BP Readings from Last 3 Encounters:   02/07/22 114/80   01/07/22 110/72   11/11/21 118/84                 Passed - Medication is active on med list        Passed - Patient is age 18 or older        Passed - No active pregnancy on record        Passed - Normal serum creatinine on file in past 12 months     Recent Labs   Lab Test 02/07/22  1257   CR 0.75       Ok to refill medication if creatinine is low          Passed - No positive pregnancy test in past 12 months        Passed - Recent (6 mo) or future (30 days) visit within the authorizing provider's specialty     Patient had office visit in the last 6 months or has a visit in the next 30 days with authorizing provider or within the authorizing provider's specialty.  See \"Patient Info\" tab in inbasket, or \"Choose Columns\" in Meds & Orders section of the refill encounter.                 "

## 2022-02-14 ENCOUNTER — TELEPHONE (OUTPATIENT)
Dept: ENDOCRINOLOGY | Facility: CLINIC | Age: 47
End: 2022-02-14

## 2022-02-14 ENCOUNTER — VIRTUAL VISIT (OUTPATIENT)
Dept: ENDOCRINOLOGY | Facility: CLINIC | Age: 47
End: 2022-02-14
Payer: COMMERCIAL

## 2022-02-14 DIAGNOSIS — E27.40 ADRENAL INSUFFICIENCY (H): Primary | ICD-10-CM

## 2022-02-14 DIAGNOSIS — E11.69 TYPE 2 DIABETES MELLITUS WITH OTHER SPECIFIED COMPLICATION, WITHOUT LONG-TERM CURRENT USE OF INSULIN (H): ICD-10-CM

## 2022-02-14 DIAGNOSIS — Z79.52 CURRENT CHRONIC USE OF SYSTEMIC STEROIDS: ICD-10-CM

## 2022-02-14 PROCEDURE — 99205 OFFICE O/P NEW HI 60 MIN: CPT | Mod: 95 | Performed by: INTERNAL MEDICINE

## 2022-02-14 NOTE — PROGRESS NOTES
Doretha is a 46 year old who is being evaluated via a billable video visit.      How would you like to obtain your AVS? MyChart  If the video visit is dropped, the invitation should be resent by: Text to cell phone: [phone]   Will anyone else be joining your video visit? No    Outcome for 02/14/22 3:09 PM: Patient is not checking blood sugars

## 2022-02-14 NOTE — PROGRESS NOTES
Endocrinology virtual Visit    Chief Complaint: New Patient and Video Visit     Information obtained from:Patient      Assessment/Treatment Plan:      Possible Adrenal insufficiency (AI): risk factors: previous treatment with Nivolumab for metastatic melanoma which can cause AI and chronic high dose steroid treatment (4 years).     Currently on prednisone of 8 mg daily.  Dose reduction causes joint pain.  Prednisone has anti-inflammatory effect therefore when dose is reduced loss of anti-inflammatory effect can cause exacerbation of known rheumatologic disorder.     From adrenal insufficiency standpoint; prednisone of 8 mg daily is above supraphysiologic dose therefore, okay to taper slowly as you are currently doing until prednisone of 5 mg daily. Don't go below prednisone of 5 mg daily until we check hypothalamus-pituitary-adrenal axis by checking a morning cortisol at 8 AM with ACTH level.  We should consider that you have adrenal insufficiency until ruled out otherwise considering the high risk possible underlying causes as discussed above.  In addition, a cortisol level checked in 2018 was low at 2.1.  Adrenal insufficiency is treated with prednisone or hydrocortisone at the physiologic dose.  Please call the following the correct course as documented below.  For maintenance treatment of adrenal insufficiency prednisone of 5 mg daily.  Sick day rules-double up the prednisone dose until back to baseline and then go back to the physiologic dose of prednisone 5 mg daily.   Off note, risk of primary AI low based on normal electrolytes without mineralocorticoid replacement therapy.    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     Patient Instructions     Doretha,     Adrenal insufficiency is diagnosed when the body is not producing enough cortisol. The problem for the cortisol production could be at the pituitary or adrenal level. You have two risk factors for adrenal insufficiency; #1  chronic high dose steroid use and # 2 past history of immunotherapy that can cause adrenal insufficiency.     Adrenal insufficiency is treated with adrenal hormone (usually hydrocortisone or prednisone).  The treatment is designed to reproduce what the body would do in a healthy state.     For patients with adrenal insufficiency, steroid treatment is necessary to maintain a healthy state of life.  Too much or too little steroid treatment can have serious consequences.  You should never discontinue your treatment for adrenal insufficiency.        During minor illness, the body normally makes more adrenal steroid and therefore you should also increase your dose of adrenal replacement medication as directed by your physician.    During major illness, or if you seek medical care because of your illness, be sure to tell the treating physician that you have  adrenal insufficiency  and that you require higher doses of steroids to compensate for the illness.      If you are unable to take your medication because of vomiting, it is important to receive the medication intravenously.     As a next step; continue to taper down prednisone by 1 mg every 2-3 weeks.     Once you are on prednisone 5 mg daily.     Please check blood work for cortisol and ACTH at 8:00 am in the morning. Don't take prednisone on the morning of the blood work. Last dose of prednisone should be 24 hours prior to the blood work. You can bring the prednisone with you to the lab on the morning of the blood work and take it after blood sample is collected.   Lab scheduling to schedule at any [place] lab locations: [phone] select option [phone]          Return to clinic in 2 months.    Test and/or medications prescribed today:  Orders Placed This Encounter   Procedures     Cortisol     Adrenal corticotropin         Cary Castellanos MD  Staff Endocrinologist    Division of Endocrinology and Diabetes      Subjective:         HPI: Doretha OG  Aimee is a 46 year old female with history of  melanoma who is seen in consultation at Don Hopper's request for adrenal insufficiency.    Diagnosed with metastatic melanoma about 4 years ago; treated with nivolumab at the time.  Has been on high-dose steroid treatment since then.  Sent to endocrinology clinic for assessment of adrenal insufficiency and for the slow taper of prednisone.    Reports that over the last 4 years she has developed rheumatoid arthritis, status post colectomy and multiple medical issues reviewed below including diabetes.  Reports that every time the prednisone dose goal below 5mg she develops acute symptoms is hospitalization.  The lowest dose she has been on was 2.5 mg.  More recently she has been in been unable to tolerate a lower dose below presents on 8 mg daily.  Currently she is on prednisone 8 mg daily which was tapered down from a higher dose a week ago.  Reports that every time she is tapering down she experiences worsening of joint pains.  She was advised that she can only undergo second surgery for colon if she is on lower dose of steroid. Pain clinic follow ups in the past. Gained 65 poiunds in 4 years dur to steroids.    240 pounds - for the last two year stable weight.   Lymphedema or the left arm.  Diagnosed with iatrogenic cushing's per report.     Allergies   Allergen Reactions     Bee Venom Swelling     Azithromycin Diarrhea     Erythromycin      Other reaction(s): GI intolerance, Vomiting     Fentanyl Other (See Comments)     sweating  sweating     Prochlorperazine Fatigue     Other reaction(s): Other (see comments)  Fatigue     Buspirone      Other reaction(s): GI intolerance  vomiting     Erythrocin Nausea and Vomiting     Zithromax [Azithromycin Dihydrate] Diarrhea     Enbrel [Etanercept] Hives and Rash       Current Outpatient Medications   Medication Sig Dispense Refill     acetaminophen (TYLENOL) 325 MG tablet Take 3 tablets (975 mg) by mouth every 8 hours As  scheduled for the next 7-10 days, then decrease both dose & frequency to as needed there after. 90 tablet 0     Alcohol Swabs (ALCOHOL PREP) 70 % PADS 1 applicator 3 times daily 100 each 0     amLODIPine (NORVASC) 5 MG tablet Take 1 tablet (5 mg) by mouth daily 90 tablet 3     blood glucose (NO BRAND SPECIFIED) lancets standard Use to test blood sugar 3 times daily or as directed. 1 each 0     blood glucose (NO BRAND SPECIFIED) test strip Use to test blood sugar 3 times daily or as directed. 200 strip 0     Calcium Carb-Cholecalciferol 600-800 MG-UNIT TABS Take 800 mg by mouth every morning (before breakfast) 90 tablet 3     calcium carbonate (TUMS) 500 MG chewable tablet Take 1 tablet (500 mg) by mouth 3 times daily as needed for heartburn 30 tablet 0     cyanocobalamin (VITAMIN B-12) 1000 MCG tablet Take 1 tablet (1,000 mcg) by mouth daily       gabapentin (NEURONTIN) 600 MG tablet TAKE ONE TABLET (600MG) BY MOUTH FOUR TIMES A DAY (Patient taking differently: Take 600 mg by mouth 2 times daily ) 120 tablet 0     hydrOXYzine (ATARAX) 25 MG tablet Take 1 tablet (25 mg) by mouth every 6 hours as needed for anxiety 40 tablet 0     loperamide (IMODIUM A-D) 2 MG tablet Take 2 tabs daily and increase as needed until output is apple sauce consistency. Max of 8 tabs (16 mg) per day. 240 tablet 3     loperamide (IMODIUM A-D) 2 MG tablet Take 2 tablets (4 mg) by mouth 4 times daily as needed for diarrhea 2 tablet 3     metFORMIN (GLUCOPHAGE) 500 MG tablet Take 2 tablets (1,000 mg) by mouth 2 times daily (with meals) 360 tablet 3     omeprazole (PRILOSEC) 40 MG DR capsule Take 1 capsule (40 mg) by mouth daily 30 capsule 5     Ostomy Supplies MISC 20 each daily 20 each 11     pantoprazole (PROTONIX) 40 MG EC tablet Take 1 tablet (40 mg) by mouth every morning (before breakfast) 90 tablet 3     predniSONE (DELTASONE) 1 MG tablet Take 10 mg daily. Taper 1 mg every 2 weeks (Patient taking differently: Take 8 mg by mouth daily  Take 10 mg daily. Taper 1 mg every 2 weeks) 120 tablet 2     predniSONE (DELTASONE) 5 MG tablet Take 10 mg daily. Taper 1 mg every 2 weeks (Patient taking differently: Take 8 mg by mouth daily Take 10 mg daily. Taper 1 mg every 2 weeks) 60 tablet 3     simethicone (MYLICON) 80 MG chewable tablet Take 1-2 tablets ( mg) by mouth 4 times daily as needed for cramping 120 tablet 0     triamcinolone (KENALOG) 0.1 % paste 1 Application by Other route       venlafaxine (EFFEXOR) 75 MG tablet TAKE 1 TABLET (75 MG) BY MOUTH 3 TIMES DAILY 90 tablet 2     VITAMIN D3 25 MCG (1000 UT) tablet TAKE THREE TABLETS BY MOUTH ONCE DAILY 300 tablet 1     VITRON-C  MG TABS tablet Take 1 tablet by mouth daily 30 tablet 1       Review of Systems     8 point review system (Constitutional, HENT, Eyes, Respiratory, Cardiovascular, Gastrointestinal, Genitourinary, Musculoskeletal,Neurological, Psychiatric/Behavioural, Endocrine) is negative or is as per HPI above      Objective:   GENERAL: alert and no distress, moon faces.   EYES: Eyes grossly normal to inspection.  No discharge or erythema, or obvious scleral/conjunctival abnormalities.  RESP: No audible wheeze, cough, or visible cyanosis.  NEURO: alert and oriented.   PSYCH: Mentation appears normal, affect normal/bright, judgement and insight intact, normal speech.    In House Labs:   Lab Results   Component Value Date    A1C 7.0 02/07/2022    A1C 7.5 06/16/2021    A1C 6.5 05/11/2021    A1C Canceled, Test credited 05/11/2021    A1C 6.1 04/05/2021    A1C 6.3 06/04/2020       TSH   Date Value Ref Range Status   10/04/2021 1.04 0.40 - 4.00 mU/L Final   06/09/2020 2.5 0.3 - 4.2 mIU/L Final   01/27/2020 1.90 0.40 - 4.00 mU/L Final   12/18/2018 0.7 0.3 - 4.2 mIU/L Final   02/15/2018 1.31 0.40 - 4.00 mU/L Final   01/18/2018 1.24 0.40 - 4.00 mU/L Final     T4 Free   Date Value Ref Range Status   10/04/2017 0.90 0.76 - 1.46 ng/dL Final       Creatinine   Date Value Ref Range Status    02/07/2022 0.75 0.52 - 1.04 mg/dL Final   06/17/2021 0.65 0.52 - 1.04 mg/dL Final         This note has been dictated using voice recognition software.  As a result, there may be errors in the documentation that have gone undetected.  Please consider this when interpreting information in this documentation.       Video-Visit Details    Type of service:  Video and telephone time   All times are in your local time  1st VideoStart: 02/14/2022 03:22 pm  Stop: 02/14/2022 04:02 pm  Both video and telephone was used for this visit.   Platform used for Video Visit: AmWell  > 70 minutes spent on the date of the encounter doing chart review (including outside records), history, documentation and further activities per the note

## 2022-02-14 NOTE — PATIENT INSTRUCTIONS
Doretha,     Adrenal insufficiency is diagnosed when the body is not producing enough cortisol. The problem for the cortisol production could be at the pituitary or adrenal level. You have two risk factors for adrenal insufficiency; #1 chronic high dose steroid use and # 2 past history of immunotherapy that can cause adrenal insufficiency.     Adrenal insufficiency is treated with adrenal hormone (usually hydrocortisone or prednisone).  The treatment is designed to reproduce what the body would do in a healthy state.     For patients with adrenal insufficiency, steroid treatment is necessary to maintain a healthy state of life.  Too much or too little steroid treatment can have serious consequences.  You should never discontinue your treatment for adrenal insufficiency.        During minor illness, the body normally makes more adrenal steroid and therefore you should also increase your dose of adrenal replacement medication as directed by your physician.    During major illness, or if you seek medical care because of your illness, be sure to tell the treating physician that you have  adrenal insufficiency  and that you require higher doses of steroids to compensate for the illness.      If you are unable to take your medication because of vomiting, it is important to receive the medication intravenously.     As a next step; continue to taper down prednisone by 1 mg every 2-3 weeks.     Once you are on prednisone 5 mg daily.     Please check blood work for cortisol and ACTH at 8:00 am in the morning. Don't take prednisone on the morning of the blood work. Last dose of prednisone should be 24 hours prior to the blood work. You can bring the prednisone with you to the lab on the morning of the blood work and take it after blood sample is collected.   Lab scheduling to schedule at any [place] lab locations: [phone] select option [phone]

## 2022-02-14 NOTE — LETTER
2/14/2022         RE: Doretha Yu  [id number]  [facility] [address]        Dear Colleague,    Thank you for referring your patient, Doretha Yu, to the [facility]. Please see a copy of my visit note below.    Doretha is a 46 year old who is being evaluated via a billable video visit.      How would you like to obtain your AVS? MyChart  If the video visit is dropped, the invitation should be resent by: Text to cell phone: [phone]   Will anyone else be joining your video visit? No    Outcome for 02/14/22 3:09 PM: Patient is not checking blood sugars      Endocrinology virtual Visit    Chief Complaint: New Patient and Video Visit     Information obtained from:Patient      Assessment/Treatment Plan:      Possible Adrenal insufficiency (AI): risk factors: previous treatment with Nivolumab for metastatic melanoma which can cause AI and chronic high dose steroid treatment (4 years).     Currently on prednisone of 8 mg daily.  Dose reduction causes joint pain.  Prednisone has anti-inflammatory effect therefore when dose is reduced loss of anti-inflammatory effect can cause exacerbation of known rheumatologic disorder.     From adrenal insufficiency standpoint; prednisone of 8 mg daily is above supraphysiologic dose therefore, okay to taper slowly as you are currently doing until prednisone of 5 mg daily. Don't go below prednisone of 5 mg daily until we check hypothalamus-pituitary-adrenal axis by checking a morning cortisol at 8 AM with ACTH level.  We should consider that you have adrenal insufficiency until ruled out otherwise considering the high risk possible underlying causes as discussed above.  In addition, a cortisol level checked in 2018 was low at 2.1.  Adrenal insufficiency is treated with prednisone or hydrocortisone at the physiologic dose.  Please call the following the correct course as documented below.  For maintenance treatment of adrenal insufficiency prednisone  of 5 mg daily.  Sick day rules-double up the prednisone dose until back to baseline and then go back to the physiologic dose of prednisone 5 mg daily.   Off note, risk of primary AI low based on normal electrolytes without mineralocorticoid replacement therapy.    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     Patient Instructions     Doretha,     Adrenal insufficiency is diagnosed when the body is not producing enough cortisol. The problem for the cortisol production could be at the pituitary or adrenal level. You have two risk factors for adrenal insufficiency; #1 chronic high dose steroid use and # 2 past history of immunotherapy that can cause adrenal insufficiency.     Adrenal insufficiency is treated with adrenal hormone (usually hydrocortisone or prednisone).  The treatment is designed to reproduce what the body would do in a healthy state.     For patients with adrenal insufficiency, steroid treatment is necessary to maintain a healthy state of life.  Too much or too little steroid treatment can have serious consequences.  You should never discontinue your treatment for adrenal insufficiency.        During minor illness, the body normally makes more adrenal steroid and therefore you should also increase your dose of adrenal replacement medication as directed by your physician.    During major illness, or if you seek medical care because of your illness, be sure to tell the treating physician that you have  adrenal insufficiency  and that you require higher doses of steroids to compensate for the illness.      If you are unable to take your medication because of vomiting, it is important to receive the medication intravenously.     As a next step; continue to taper down prednisone by 1 mg every 2-3 weeks.     Once you are on prednisone 5 mg daily.     Please check blood work for cortisol and ACTH at 8:00 am in the morning. Don't take prednisone on the morning of the blood work. Last  dose of prednisone should be 24 hours prior to the blood work. You can bring the prednisone with you to the lab on the morning of the blood work and take it after blood sample is collected.   Lab scheduling to schedule at any [place] lab locations: [phone] select option 1          Return to clinic in 2 months.    Test and/or medications prescribed today:  Orders Placed This Encounter   Procedures     Cortisol     Adrenal corticotropin         Cary Castellanos MD  Staff Endocrinologist    Division of Endocrinology and Diabetes      Subjective:         HPI: Doretha Yu is a 46 year old female with history of  melanoma who is seen in consultation at [facility]'s request for adrenal insufficiency.    Diagnosed with metastatic melanoma about 4 years ago; treated with nivolumab at the time.  Has been on high-dose steroid treatment since then.  Sent to endocrinology clinic for assessment of adrenal insufficiency and for the slow taper of prednisone.    Reports that over the last 4 years she has developed rheumatoid arthritis, status post colectomy and multiple medical issues reviewed below including diabetes.  Reports that every time the prednisone dose goal below 5mg she develops acute symptoms is hospitalization.  The lowest dose she has been on was 2.5 mg.  More recently she has been in been unable to tolerate a lower dose below presents on 8 mg daily.  Currently she is on prednisone 8 mg daily which was tapered down from a higher dose a week ago.  Reports that every time she is tapering down she experiences worsening of joint pains.  She was advised that she can only undergo second surgery for colon if she is on lower dose of steroid. Pain clinic follow ups in the past. Gained 65 poiunds in 4 years dur to steroids.    240 pounds - for the last two year stable weight.   Lymphedema or the left arm.  Diagnosed with iatrogenic cushing's per report.     Allergies   Allergen Reactions     Bee  Venom Swelling     Azithromycin Diarrhea     Erythromycin      Other reaction(s): GI intolerance, Vomiting     Fentanyl Other (See Comments)     sweating  sweating     Prochlorperazine Fatigue     Other reaction(s): Other (see comments)  Fatigue     Buspirone      Other reaction(s): GI intolerance  vomiting     Erythrocin Nausea and Vomiting     Zithromax [Azithromycin Dihydrate] Diarrhea     Enbrel [Etanercept] Hives and Rash       Current Outpatient Medications   Medication Sig Dispense Refill     acetaminophen (TYLENOL) 325 MG tablet Take 3 tablets (975 mg) by mouth every 8 hours As scheduled for the next 7-10 days, then decrease both dose & frequency to as needed there after. 90 tablet 0     Alcohol Swabs (ALCOHOL PREP) 70 % PADS 1 applicator 3 times daily 100 each 0     amLODIPine (NORVASC) 5 MG tablet Take 1 tablet (5 mg) by mouth daily 90 tablet 3     blood glucose (NO BRAND SPECIFIED) lancets standard Use to test blood sugar 3 times daily or as directed. 1 each 0     blood glucose (NO BRAND SPECIFIED) test strip Use to test blood sugar 3 times daily or as directed. 200 strip 0     Calcium Carb-Cholecalciferol 600-800 MG-UNIT TABS Take 800 mg by mouth every morning (before breakfast) 90 tablet 3     calcium carbonate (TUMS) 500 MG chewable tablet Take 1 tablet (500 mg) by mouth 3 times daily as needed for heartburn 30 tablet 0     cyanocobalamin (VITAMIN B-12) 1000 MCG tablet Take 1 tablet (1,000 mcg) by mouth daily       gabapentin (NEURONTIN) 600 MG tablet TAKE ONE TABLET (600MG) BY MOUTH FOUR TIMES A DAY (Patient taking differently: Take 600 mg by mouth 2 times daily ) 120 tablet 0     hydrOXYzine (ATARAX) 25 MG tablet Take 1 tablet (25 mg) by mouth every 6 hours as needed for anxiety 40 tablet 0     loperamide (IMODIUM A-D) 2 MG tablet Take 2 tabs daily and increase as needed until output is apple sauce consistency. Max of 8 tabs (16 mg) per day. 240 tablet 3     loperamide (IMODIUM A-D) 2 MG tablet Take  2 tablets (4 mg) by mouth 4 times daily as needed for diarrhea 2 tablet 3     metFORMIN (GLUCOPHAGE) 500 MG tablet Take 2 tablets (1,000 mg) by mouth 2 times daily (with meals) 360 tablet 3     omeprazole (PRILOSEC) 40 MG DR capsule Take 1 capsule (40 mg) by mouth daily 30 capsule 5     Ostomy Supplies MISC 20 each daily 20 each 11     pantoprazole (PROTONIX) 40 MG EC tablet Take 1 tablet (40 mg) by mouth every morning (before breakfast) 90 tablet 3     predniSONE (DELTASONE) 1 MG tablet Take 10 mg daily. Taper 1 mg every 2 weeks (Patient taking differently: Take 8 mg by mouth daily Take 10 mg daily. Taper 1 mg every 2 weeks) 120 tablet 2     predniSONE (DELTASONE) 5 MG tablet Take 10 mg daily. Taper 1 mg every 2 weeks (Patient taking differently: Take 8 mg by mouth daily Take 10 mg daily. Taper 1 mg every 2 weeks) 60 tablet 3     simethicone (MYLICON) 80 MG chewable tablet Take 1-2 tablets ( mg) by mouth 4 times daily as needed for cramping 120 tablet 0     triamcinolone (KENALOG) 0.1 % paste 1 Application by Other route       venlafaxine (EFFEXOR) 75 MG tablet TAKE 1 TABLET (75 MG) BY MOUTH 3 TIMES DAILY 90 tablet 2     VITAMIN D3 25 MCG (1000 UT) tablet TAKE THREE TABLETS BY MOUTH ONCE DAILY 300 tablet 1     VITRON-C  MG TABS tablet Take 1 tablet by mouth daily 30 tablet 1       Review of Systems     8 point review system (Constitutional, HENT, Eyes, Respiratory, Cardiovascular, Gastrointestinal, Genitourinary, Musculoskeletal,Neurological, Psychiatric/Behavioural, Endocrine) is negative or is as per HPI above      Objective:   GENERAL: alert and no distress, moon faces.   EYES: Eyes grossly normal to inspection.  No discharge or erythema, or obvious scleral/conjunctival abnormalities.  RESP: No audible wheeze, cough, or visible cyanosis.  NEURO: alert and oriented.   PSYCH: Mentation appears normal, affect normal/bright, judgement and insight intact, normal speech.    In House Labs:   Lab Results    Component Value Date    A1C 7.0 02/07/2022    A1C 7.5 06/16/2021    A1C 6.5 05/11/2021    A1C Canceled, Test credited 05/11/2021    A1C 6.1 04/05/2021    A1C 6.3 06/04/2020       TSH   Date Value Ref Range Status   10/04/2021 1.04 0.40 - 4.00 mU/L Final   06/09/2020 2.5 0.3 - 4.2 mIU/L Final   01/27/2020 1.90 0.40 - 4.00 mU/L Final   12/18/2018 0.7 0.3 - 4.2 mIU/L Final   02/15/2018 1.31 0.40 - 4.00 mU/L Final   01/18/2018 1.24 0.40 - 4.00 mU/L Final     T4 Free   Date Value Ref Range Status   10/04/2017 0.90 0.76 - 1.46 ng/dL Final       Creatinine   Date Value Ref Range Status   02/07/2022 0.75 0.52 - 1.04 mg/dL Final   06/17/2021 0.65 0.52 - 1.04 mg/dL Final         This note has been dictated using voice recognition software.  As a result, there may be errors in the documentation that have gone undetected.  Please consider this when interpreting information in this documentation.       Video-Visit Details    Type of service:  Video and telephone time   All times are in your local time  1st VideoStart: 02/14/2022 03:22 pm  Stop: 02/14/2022 04:02 pm  Both video and telephone was used for this visit.   Platform used for Video Visit: Well  > 70 minutes spent on the date of the encounter doing chart review (including outside records), history, documentation and further activities per the note        Again, thank you for allowing me to participate in the care of your patient.        Sincerely,        Cary Castellanos MD

## 2022-02-28 ENCOUNTER — VIRTUAL VISIT (OUTPATIENT)
Dept: SLEEP MEDICINE | Facility: CLINIC | Age: 47
End: 2022-02-28
Payer: COMMERCIAL

## 2022-02-28 ENCOUNTER — DOCUMENTATION ONLY (OUTPATIENT)
Dept: SLEEP MEDICINE | Facility: CLINIC | Age: 47
End: 2022-02-28

## 2022-02-28 VITALS
DIASTOLIC BLOOD PRESSURE: 80 MMHG | SYSTOLIC BLOOD PRESSURE: 114 MMHG | HEIGHT: 63 IN | BODY MASS INDEX: 43.5 KG/M2 | WEIGHT: 245.5 LBS

## 2022-02-28 DIAGNOSIS — G47.33 OSA (OBSTRUCTIVE SLEEP APNEA): Primary | ICD-10-CM

## 2022-02-28 PROCEDURE — 99214 OFFICE O/P EST MOD 30 MIN: CPT | Mod: 95 | Performed by: PHYSICIAN ASSISTANT

## 2022-02-28 ASSESSMENT — SLEEP AND FATIGUE QUESTIONNAIRES
HOW LIKELY ARE YOU TO NOD OFF OR FALL ASLEEP WHILE SITTING AND READING: HIGH CHANCE OF DOZING
HOW LIKELY ARE YOU TO NOD OFF OR FALL ASLEEP WHILE SITTING AND TALKING TO SOMEONE: WOULD NEVER DOZE
HOW LIKELY ARE YOU TO NOD OFF OR FALL ASLEEP WHILE WATCHING TV: HIGH CHANCE OF DOZING
HOW LIKELY ARE YOU TO NOD OFF OR FALL ASLEEP IN A CAR, WHILE STOPPED FOR A FEW MINUTES IN TRAFFIC: WOULD NEVER DOZE
HOW LIKELY ARE YOU TO NOD OFF OR FALL ASLEEP WHILE LYING DOWN TO REST IN THE AFTERNOON WHEN CIRCUMSTANCES PERMIT: HIGH CHANCE OF DOZING
HOW LIKELY ARE YOU TO NOD OFF OR FALL ASLEEP WHILE SITTING INACTIVE IN A PUBLIC PLACE: HIGH CHANCE OF DOZING
HOW LIKELY ARE YOU TO NOD OFF OR FALL ASLEEP WHEN YOU ARE A PASSENGER IN A CAR FOR AN HOUR WITHOUT A BREAK: WOULD NEVER DOZE
HOW LIKELY ARE YOU TO NOD OFF OR FALL ASLEEP WHILE SITTING QUIETLY AFTER LUNCH WITHOUT ALCOHOL: MODERATE CHANCE OF DOZING

## 2022-02-28 ASSESSMENT — PATIENT HEALTH QUESTIONNAIRE - PHQ9: SUM OF ALL RESPONSES TO PHQ QUESTIONS 1-9: 15

## 2022-02-28 ASSESSMENT — PAIN SCALES - GENERAL: PAINLEVEL: MODERATE PAIN (4)

## 2022-02-28 NOTE — PROGRESS NOTES
Doretha is a 47 year old who is being evaluated via a billable video visit.      How would you like to obtain your AVS? MyChart  If the video visit is dropped, the invitation should be resent by: Send to e-mail at: [email or web address]  Will anyone else be joining your video visit? No      Video Start Time: 9:03 AM  Video-Visit Details    Type of service:  Video Visit    Video End Time:9:21 AM    Originating Location (pt. Location): Home    Distant Location (provider location):  [facility]     Platform used for Video Visit: [employer]       Medication and allergies have been reviewed.   Victorino Gonzalez, RAEANN      Obstructive Sleep Apnea - PAP Follow-Up Visit:    Chief Complaint   Patient presents with     Video Visit     Re-eval for sleep apnea        Doretha Yu comes in today for follow-up of their moderate sleep apnea, managed with CPAP.     Doretha Fernandez is a 46 year old female who had a sleep study for snoring, daytime sleepiness and abnormal overnight oximetry. Polysomnogram data obtained. The test was done at [place] in 1/2019 (241#)-AHI 24, lowest oxygen saturation was 86%, no periodic limb movement were noted, CPAP 8 cm/H20 was effective. She was diagnosed with moderate STORMY and has been on CPAP 8 cmH2O since January 2019.    She presented to [facility] in 2020 to establish care for STORMY and  restless leg syndrome.    Weight now is 245 lbs    Overall, the patient rates her experience with PAP as 7 (0 poor, 10 great). The mask is comfortable. The mask is leaking.  She is not snoring with the mask on. She is having gasp arousals. She is not having any oral or nasal dryness. The pressure settings are comfortable     Her PAP interface is Nasal Mask.    Bedtime is typically 10 PM-12:30 AM. Usually it takes about 5-45 minutes to fall asleep with the mask on. She wakes up multiple times as she has an ostomy bag.  Wake time is typically 7 AM.  Patient is using PAP therapy 6 hours per night. The  patient is usually getting 6 hours of sleep per night. She picks up the phone if she cannot fall sleep.     She does not feel rested in the morning.    Total score - Bogart: 14 (2/28/2022  8:37 AM)      JOSÉ MIGUEL Total Score: 23      ResMed   Auto-PAP 8 - 15 cmH2O 30 day usage data:    100% of days with > 4 hours of use. 0/30 days with no use.   Average use 7 hours and 37 minutes per day.   95%ile Leak 0.2 L/min.   CPAP 95% pressure 10.7 cm.   AHI 1.3 events per hour.       Restless leg syndrome: Weaning off gabapentin and realizes that she needs so she will continue on Gabapentin 600 mg BID. She is iron supplementation also    Past medical/surgical history, family history, social history, medications and allergies were reviewed.      Problem List:  Patient Active Problem List    Diagnosis Date Noted     Migraines      Priority: Medium     Colostomy in place (H) 07/16/2021     Priority: Medium     S/P colectomy 07/16/2021     Priority: Medium     Ulcerative colitis without complications, unspecified location (H) 06/03/2021     Priority: Medium     Dehydration 04/13/2021     Priority: Medium     Hematochezia 04/13/2021     Priority: Medium     Diarrhea of infectious origin 04/13/2021     Priority: Medium     C. difficile colitis 04/13/2021     Priority: Medium     Adjustment disorder with mixed emotional features 06/16/2020     Priority: Medium     Polyarthralgia 04/29/2020     Priority: Medium     Drug-induced polyneuropathy (H) 04/29/2020     Priority: Medium     Pain syndrome, chronic 02/12/2020     Priority: Medium     Drug-induced Cushing's syndrome (H) 02/12/2020     Priority: Medium     Diabetes mellitus, iatrogenic (H) 01/28/2020     Priority: Medium     High risk HPV infection 01/28/2020     Priority: Medium     Added automatically from request for surgery [id number]       Immunosuppression (H) 01/28/2020     Priority: Medium     Added automatically from request for surgery [id number]       STORMY (obstructive sleep  apnea) 01/27/2020     Priority: Medium     1/2019 (241#)-AHI 24, lowest oxygen saturation was 86%, no periodic limb movement were noted, CPAP 8 cm/H20 was effective.       Secondary lymphedema 01/27/2020     Priority: Medium     Chronic neutrophilia 01/27/2020     Priority: Medium     Cervical high risk HPV (human papillomavirus) test positive 12/13/2019     Priority: Medium     2011 NIL pap.  2015 NIL pap, Neg HPV.  12/13/19 NIL pap , + HR HPV 16. Plan colp.   1/6/19 Failed colp exam secondary to anatomic constraints with gyn. Referred to gyn/onc.   2/25/20 COLP and ECC- Negative for dysplasia. Plan cotest in 1 year.   8/20/2021 NIL pap, Neg HPV. Plan cotest in 1 year.        Immunosuppressed status (H) 09/05/2019     Priority: Medium     Ulcerative colitis with complication, unspecified location (H) 09/05/2019     Priority: Medium     Morbid obesity (H) 09/05/2019     Priority: Medium     Arthritis, rheumatoid (H) 11/26/2018     Priority: Medium     Hypocalcemia 05/15/2018     Priority: Medium     Anemia 05/14/2018     Priority: Medium     Menstrual irregularity 05/14/2018     Priority: Medium     Arthralgia of both knees 05/12/2018     Priority: Medium     Formatting of this note might be different from the original.  Work-up in progress. There is concern for inflammatory arthritis.       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     Priority: Medium     Malignant melanoma of left  "upper extremity including shoulder (H) 10/12/2017     Priority: Medium     Metastatic malignant melanoma (H) 10/10/2017     Priority: Medium     Prothrombin mutation (H) 04/05/2017     Priority: Medium     On daily aspirin 81 mg per hematology's recommendations from 2008       Vision changes 04/01/2017     Priority: Medium     Mild intermittent asthma without complication 11/08/2013     Priority: Medium     Moderate major depression (H) 06/24/2013     Priority: Medium     Anxiety state 09/13/2012     Priority: Medium     Problem list name updated by automated process. Provider to review       Esophageal reflux 09/13/2012     Priority: Medium     DUB (dysfunctional uterine bleeding) 07/28/2011     Priority: Medium     CARDIOVASCULAR SCREENING; LDL GOAL LESS THAN 160 07/28/2011     Priority: Low        /80   Ht 1.6 m (5' 2.99\")   Wt 111.4 kg (245 lb 8 oz)   BMI 43.50 kg/m      Impression/Plan:     Moderate Sleep apnea.  Tolerating PAP well. Daytime symptoms are improved, but not remedied completely.  Obstructive sleep apnea appears well controlled per download, but due to reported breakthrough snoring and daytime sleepiness, will change to auto-CPAP 10-16 cm/H20.     Restless leg syndrome: Was weaning off gabapentin to get off and realizes that she needs Gabapentin due to return of RLS symptoms. She will continue on Gabapentin 600 mg BID. She can stop iron supplementation as Ferritin was 210 ng/ml on 1/6/2022.    We discussed discussed sleep hygiene, stimulus control and sleep restriction to help with difficulty falling and staying asleep.     Doretha OG Yu will follow up in about 6 month(s).     36  minutes spent on day of encounter doing chart review,  history and exam, counseling, coordinating plan of care, documentation and further activities as noted above.      Sachin Machado PA-C  "

## 2022-02-28 NOTE — PROGRESS NOTES
Patient came to [place]  for mask fitting appointment on February 28, 2022. Patient requested to switch masks because getting rash from N20- medium. Discussed the following masks: N20 Airtouch memory foam. Patient selected a Resmed Mask name: N20 Nasal mask size Medium Airtouch

## 2022-03-03 NOTE — NURSING NOTE
6 Month reminder sent to pt via [employer]. Marked to send 1 month before follow up due.    SHERRILL Murguia

## 2022-03-07 ENCOUNTER — TELEPHONE (OUTPATIENT)
Dept: FAMILY MEDICINE | Facility: CLINIC | Age: 47
End: 2022-03-07
Payer: COMMERCIAL

## 2022-03-07 DIAGNOSIS — K21.9 GASTROESOPHAGEAL REFLUX DISEASE WITHOUT ESOPHAGITIS: Chronic | ICD-10-CM

## 2022-03-07 RX ORDER — PANTOPRAZOLE SODIUM 40 MG/1
40 TABLET, DELAYED RELEASE ORAL
Qty: 90 TABLET | Refills: 3 | Status: CANCELLED | OUTPATIENT
Start: 2022-03-07

## 2022-03-10 DIAGNOSIS — G89.29 OTHER CHRONIC PAIN: ICD-10-CM

## 2022-03-10 NOTE — TELEPHONE ENCOUNTER
Routing refill request to provider for review/approval because:  Drug not on the [facility] refill protocol     Pending Prescriptions:                       Disp   Refills    gabapentin (NEURONTIN) 600 MG tablet [Phar*120 ta*0        Sig: TAKE ONE TABLET (600MG) BY MOUTH FOUR TIMES A DAY    Britany Keenan RN on 3/10/2022 at 1:44 PM

## 2022-03-10 NOTE — TELEPHONE ENCOUNTER
Patient said she is sick and said she needs this asap. Asking if another provider can fill it.    Vonda Vera PSC on 3/10/2022 at 4:42 PM

## 2022-03-11 DIAGNOSIS — G89.29 OTHER CHRONIC PAIN: ICD-10-CM

## 2022-03-11 RX ORDER — GABAPENTIN 600 MG/1
TABLET ORAL
Qty: 120 TABLET | Refills: 0 | Status: SHIPPED | OUTPATIENT
Start: 2022-03-11 | End: 2022-05-12

## 2022-03-11 RX ORDER — GABAPENTIN 600 MG/1
TABLET ORAL
Qty: 120 TABLET | Refills: 0 | Status: CANCELLED | OUTPATIENT
Start: 2022-03-11

## 2022-03-11 NOTE — TELEPHONE ENCOUNTER
Routing refill request to provider for review/approval because:  Drug not on the [facility] refill protocol     Pending Prescriptions:                       Disp   Refills    gabapentin (NEURONTIN) 600 MG tablet [Phar*120 ta*0        Sig: TAKE ONE TABLET (600MG) BY MOUTH FOUR TIMES A DAY    Britnay Keenan RN on 3/11/2022 at 8:31 AM

## 2022-03-11 NOTE — TELEPHONE ENCOUNTER
Pt was called and informed, she wanted it sent to [facility] pharmacy instead of her usual  [facility] pharmacy, so this RN called pharmacy and they will have [place] fill it. Floridalma Wilde RN

## 2022-03-11 NOTE — TELEPHONE ENCOUNTER
Reason for Call:  Medication or medication refill:gabapentin    Do you use a [facility]?  Name of the pharmacy and phone number for the current request:  [facility] Please because [place] will be closed  Name of the medication requested: Gabapentin    Other request: needs TODAY PLEASE   She has been asking for '3 days now' and is feeling terrible. [place] [facility]    Can we leave a detailed message on this number? YES    Phone number patient can be reached at: Home number on file [phone] (home)    Best Time: any    Call taken on 3/11/2022 at 2:10 PM by Maricarmen Goldstein

## 2022-03-12 ENCOUNTER — MYC MEDICAL ADVICE (OUTPATIENT)
Dept: FAMILY MEDICINE | Facility: CLINIC | Age: 47
End: 2022-03-12
Payer: COMMERCIAL

## 2022-03-14 ENCOUNTER — OFFICE VISIT (OUTPATIENT)
Dept: DERMATOLOGY | Facility: CLINIC | Age: 47
End: 2022-03-14
Payer: COMMERCIAL

## 2022-03-14 VITALS — DIASTOLIC BLOOD PRESSURE: 85 MMHG | HEART RATE: 102 BPM | OXYGEN SATURATION: 95 % | SYSTOLIC BLOOD PRESSURE: 126 MMHG

## 2022-03-14 DIAGNOSIS — D18.01 ANGIOMA OF SKIN: ICD-10-CM

## 2022-03-14 DIAGNOSIS — D23.9 DERMAL NEVUS: ICD-10-CM

## 2022-03-14 DIAGNOSIS — Z85.828 HISTORY OF SKIN CANCER: ICD-10-CM

## 2022-03-14 DIAGNOSIS — L81.4 LENTIGO: ICD-10-CM

## 2022-03-14 DIAGNOSIS — L82.1 SEBORRHEIC KERATOSIS: Primary | ICD-10-CM

## 2022-03-14 DIAGNOSIS — Z85.820 HISTORY OF MELANOMA: ICD-10-CM

## 2022-03-14 PROCEDURE — 99213 OFFICE O/P EST LOW 20 MIN: CPT | Performed by: DERMATOLOGY

## 2022-03-14 NOTE — NURSING NOTE
Chief Complaint   Patient presents with     Derm Problem     skin check        Denisha David on 3/14/2022 at 2:04 PM

## 2022-03-14 NOTE — TELEPHONE ENCOUNTER
Central Prior Authorization Team   Phone: [phone]      PA APPROVAL ALREADY ON FILE    Medication: pantoprazole 40mg -APPROVAL ALREADY ON FILE  Insurance Company:    Pharmacy Filling the Rx:    Filling Pharmacy Phone:    Filling Pharmacy Fax:    Start Date: 3/14/2022    Insurance states there is already a PA on file.  Please see telephone encounter from 2/25/22.  Pharmacy received a paid claim on 03/09/22.

## 2022-03-14 NOTE — LETTER
3/14/2022         RE: Doretha Yu  [id number]  [facility] [address]        Dear Colleague,    Thank you for referring your patient, Doretha Yu, to the [facility]. Please see a copy of my visit note below.    Doretha Fernandez is an extremely pleasant 47 year old year old female patient here today for f/u h xof melanoma unknown primary.  She went to Dr. Cool for node dissection.1 out of 20 nodes positive.  No adjuvant Radiation.  of brain and PET scan both clear.  Was on  Nivolumab got 8 injection but got colitis.  She went to [place].   She was treated with high dose steroids for colitis and had a dx of bacterial pneumonia.   She was admitted for to hospital for this. She is followed by GI She is off of melanoma meds.  She was also dx with seronegative rheumatoid arthritis and was on humira. Scans havebeen clear. She notes some swelling in left arm where nodes were revmoed.  On prednisone still 7mg.  Associated symptoms: none.  Patient has no other skin complaints today.  Remainder of the HPI, Meds, PMH, Allergies, FH, and SH was reviewed in chart.    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      Anxiety      Basal cell carcinoma      Cervical high risk HPV (human papillomavirus) test positive 2019    See problem list     Colitis      Depression      Diabetes mellitus, iatrogenic (H) 2020     Esophageal reflux      Inflammatory arthritis      Insomnia      Intestinal giardiasis 3/5/2018     Lumbago     left lower back pain     Lymphedema      Malignant melanoma (H)      Melanoma (H) 10/23/2017     Migraines      Mild persistent asthma      Morbid obesity with BMI of 40.0-44.9, adult (H)      STORMY (obstructive sleep apnea)      Prothrombin deficiency (H)     takes 81mg asa daily     Stroke (cerebrum) (H)     During      TIA (transient ischemic attack)      Type 2 diabetes mellitus (H)         Past Surgical History:   Procedure Laterality Date     APPENDECTOMY       COLONOSCOPY N/A 10/18/2017    Procedure: COLONOSCOPY;  Colon;  Surgeon: Debbie Stephens MD;  Location: [facility]     COLONOSCOPY N/A 3/9/2018    Procedure: COMBINED COLONOSCOPY, SINGLE OR MULTIPLE BIOPSY/POLYPECTOMY BY BIOPSY;  colon;  Surgeon: Benita Schumacher MD;  Location: [facility] GI     COLONOSCOPY      multiple since  to present - about 6 total     DISSECT LYMPH NODE AXILLA Left 10/23/2017    Procedure: DISSECT LYMPH NODE AXILLA;  Left Axillary Lymph Node Dissection ;  Surgeon: Laurent Cool MD;  Location: [facility] OR     EXAM UNDER ANESTHESIA PELVIC N/A 2020    Procedure: EXAM UNDER ANESTHESIA, PELVIS; with Cervical Biopsies, Vaginal Biopsy and Endocervical Curettings;  Surgeon: Melina Jnug MD;  Location: [facility] OR     GYN SURGERY  ,          LAPAROSCOPIC ASSISTED COLECTOMY N/A 6/15/2021    Procedure: laparoscopic total abdominal colectomy, end ileostomy;  Surgeon: Quinton Ram MD;  Location: [facility] OR     REPAIR MOHS Left 2017    Procedure: REPAIR MOHS;  Left Upper Lid Moh's Reconstruction;  Surgeon: Kisha Bosch MD;  Location: [facility]        Family History   Problem Relation Age of Onset     Cancer Mother 45        lung     Neurologic Disorder Mother         epilepsy     Lipids Father      Gastrointestinal Disease Father         diverticulitis      Depression Father      Colitis Father      Colon Cancer Father      Diverticulitis Father      Cancer Maternal Grandmother      Blood Disease Maternal Grandmother         lymphoma      Arthritis Maternal Grandmother      Diabetes Maternal Grandmother      Depression Maternal Grandmother      Macular Degeneration Maternal Grandmother      Glaucoma Maternal Grandmother      Diabetes Maternal Grandfather      Cerebrovascular Disease Maternal Grandfather      Blood Disease Maternal Grandfather      Heart Disease Maternal Grandfather      Glaucoma  "Maternal Grandfather      Cancer Paternal Grandmother      Cancer - colorectal Paternal Grandmother      Colitis Paternal Grandmother      Colon Cancer Paternal Grandmother      Diverticulitis Paternal Grandmother      Respiratory Paternal Grandfather         emphysema      Colitis Paternal Grandfather      Colon Cancer Paternal Grandfather      Diverticulitis Paternal Grandfather      Heart Disease Daughter      Asthma Daughter      Depression Sister      Melanoma No family hx of        Social History     Socioeconomic History     Marital status:      Spouse name: Not on file     Number of children: Not on file     Years of education: Not on file     Highest education level: Not on file   Occupational History     Not on file   Tobacco Use     Smoking status: Former Smoker     Packs/day: 1.00     Years: 5.00     Pack years: 5.00     Quit date: 3/20/1998     Years since quittin.0     Smokeless tobacco: Never Used   Vaping Use     Vaping Use: Never used   Substance and Sexual Activity     Alcohol use: Not Currently     Drug use: No     Sexual activity: Not Currently     Partners: Male     Birth control/protection: Surgical   Other Topics Concern     Parent/sibling w/ CABG, MI or angioplasty before 65F 55M? No   Social History Narrative    20 y.o- patient's mother   of lung cancer. She had to take care of her younger sister.    2012- patient's  had a heart attack with stents placed, followed by cardiac rehabilitation    2000 TO 2012  was in [facility] for [facility]    2013 patient's  went through alcohol rehabilitation at [place] inpatient            They have attended couple counseling a couple of times and patient went to the family program for chemical dependency.    Patient denies alcohol or drug use and herself            Has 2 children, girls ages 17 and 14. Oldest has been a \"trouble maker.\"     For a while she was " working 3 jobs since her  was ill. 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 Reports her job is very stressful.         Social Determinants of Health     Financial Resource Strain: High Risk     Difficulty of Paying Living Expenses: Very hard   Food Insecurity: No Food Insecurity     Worried About Running Out of Food in the Last Year: Never true     Ran Out of Food in the Last Year: Never true   Transportation Needs: No Transportation Needs     Lack of Transportation (Medical): No     Lack of Transportation (Non-Medical): No   Physical Activity: Not on file   Stress: Stress Concern Present     Feeling of Stress : Very much   Social Connections: Unknown     Frequency of Communication with Friends and Family: Not asked     Frequency of Social Gatherings with Friends and Family: Not asked     Attends [employer] Services: Not asked     Active Member of Clubs or Organizations: Not asked     Attends Club or Organization Meetings: Not asked     Marital Status:    Intimate Partner Violence: Unknown     Fear of Current or Ex-Partner: Not asked     Emotionally Abused: Not asked     Physically Abused: Not asked     Sexually Abused: Not asked   Housing Stability: Not on file       Outpatient Encounter Medications as of 3/14/2022   Medication Sig Dispense Refill     acetaminophen (TYLENOL) 325 MG tablet Take 3 tablets (975 mg) by mouth every 8 hours As scheduled for the next 7-10 days, then decrease both dose & frequency to as needed there after. 90 tablet 0     Alcohol Swabs (ALCOHOL PREP) 70 % PADS 1 applicator 3 times daily 100 each 0     amLODIPine (NORVASC) 5 MG tablet Take 1 tablet (5 mg) by mouth daily 90 tablet 3     blood glucose (NO BRAND SPECIFIED) lancets standard Use to test blood sugar 3 times daily or as directed. 1 each 0     blood glucose (NO BRAND SPECIFIED) test strip Use to test blood sugar 3 times daily or as directed. 200 strip 0     Calcium Carb-Cholecalciferol 600-800 MG-UNIT TABS  Take 800 mg by mouth every morning (before breakfast) 90 tablet 3     calcium carbonate (TUMS) 500 MG chewable tablet Take 1 tablet (500 mg) by mouth 3 times daily as needed for heartburn 30 tablet 0     cyanocobalamin (VITAMIN B-12) 1000 MCG tablet Take 1 tablet (1,000 mcg) by mouth daily       gabapentin (NEURONTIN) 600 MG tablet TAKE ONE TABLET (600MG) BY MOUTH FOUR TIMES A  tablet 0     hydrOXYzine (ATARAX) 25 MG tablet Take 1 tablet (25 mg) by mouth every 6 hours as needed for anxiety 40 tablet 0     loperamide (IMODIUM A-D) 2 MG tablet Take 2 tabs daily and increase as needed until output is apple sauce consistency. Max of 8 tabs (16 mg) per day. 240 tablet 3     loperamide (IMODIUM A-D) 2 MG tablet Take 2 tablets (4 mg) by mouth 4 times daily as needed for diarrhea 2 tablet 3     metFORMIN (GLUCOPHAGE) 500 MG tablet Take 2 tablets (1,000 mg) by mouth 2 times daily (with meals) 360 tablet 3     omeprazole (PRILOSEC) 40 MG DR capsule Take 1 capsule (40 mg) by mouth daily 30 capsule 5     Ostomy Supplies MISC 20 each daily 20 each 11     pantoprazole (PROTONIX) 40 MG EC tablet Take 1 tablet (40 mg) by mouth every morning (before breakfast) 90 tablet 3     predniSONE (DELTASONE) 1 MG tablet Take 10 mg daily. Taper 1 mg every 2 weeks (Patient taking differently: Take 8 mg by mouth daily Take 10 mg daily. Taper 1 mg every 2 weeks) 120 tablet 2     predniSONE (DELTASONE) 5 MG tablet Take 10 mg daily. Taper 1 mg every 2 weeks (Patient taking differently: Take 8 mg by mouth daily Take 10 mg daily. Taper 1 mg every 2 weeks) 60 tablet 3     simethicone (MYLICON) 80 MG chewable tablet Take 1-2 tablets ( mg) by mouth 4 times daily as needed for cramping 120 tablet 0     triamcinolone (KENALOG) 0.1 % paste 1 Application by Other route       venlafaxine (EFFEXOR) 75 MG tablet TAKE 1 TABLET (75 MG) BY MOUTH 3 TIMES DAILY 90 tablet 2     VITAMIN D3 25 MCG (1000 UT) tablet TAKE THREE TABLETS BY MOUTH ONCE DAILY  300 tablet 1     VITRON-C  MG TABS tablet Take 1 tablet by mouth daily 30 tablet 1     Facility-Administered Encounter Medications as of 3/14/2022   Medication Dose Route Frequency Provider Last Rate Last Admin     lidocaine 1 % 9 mL  9 mL Intradermal Once Anna Naidu MD         sodium bicarbonate 8.4 % injection 1 mEq  1 mEq Intradermal Once Anna Naidu MD                 O:   NAD, WDWN, Alert & Oriented, Mood & Affect wnl, Vitals stable   Here today alone   General appearance normal   Vitals stable   Alert, oriented and in no acute distress      Following lymph nodes palpated: Occipital, Cervical, Supraclavicular , , axilla inguinal and femoral no lad                               Pink papules on trunk    Stuck on papules and brown macules on trunk and ext   Red papules on trunk  Flesh colored papules on trunk     The remainder of the full exam was normal; the following areas were examined:  conjunctiva/lids, oral mucosa, neck, peripheral vascular system, abdomen, lymph nodes, digits/nails, eccrine and apocrine glands, scalp/hair, face, neck, chest, abdomen, buttocks, back, RUE, LUE, RLE, LLE       Eyes: Conjunctivae/lids:Normal     ENT: Lips, buccal mucosa, tongue: normal    MSK:Normal    Cardiovascular: peripheral edema none    Pulm: Breathing Normal    Lymph Nodes: No Head and Neck Lymphadenopathy     Neuro/Psych: Orientation:Alert and Orientedx3 ; Mood/Affect:normal       A/P:  1. Seborrheic keratosis, lentigo, angioma, dermal nevus, hx of n,sc hx of metastatic melanoma  MELANOMA DISCUSSED WITH PATIENT:  I discussed the specifics of tumor, prognosis, metachronous melanoma, self exam, and genetics with the patient. I explained the need for monthly skin exams including and taught the patient how to do this. Patient was asked about new or changing moles . I discussed with patient signs and symptoms that could arise in the setting of recurrent locoregional or metastatic disease. In addition, the  need to undergo every 6 month dermatologic full skin survey and evaluation given that patients with a diagnosis of melanoma are at risk of recurrence (local and distant) and of subsequent de bhumi melanoma.      2. L arm swelling likely from node dissection   mychart us in 1 month if no change will order ultrasound, last CT scan was clear  It was a pleasure speaking to Doretha Yu today.  Previous clinic notes and pertinent laboratory tests were reviewed prior to Doretha Yu's visit.  BENIGN LESIONS DISCUSSED WITH PATIENT:  I discussed the specifics of tumor, prognosis, and genetics of benign lesions.  I explained that treatment of these lesions would be purely cosmetic and not medically neccessary.  I discussed with patient different removal options including excision, cautery and /or laser.      Nature and genetics of benign skin lesions dicussed with patient.  Signs and Symptoms of skin cancer discussed with patient.  Patient encouraged to perform monthly skin exams.  UV precautions reviewed with patient.  Return to clinic 6 months        Again, thank you for allowing me to participate in the care of your patient.        Sincerely,        Lowell Bullock MD

## 2022-03-14 NOTE — PROGRESS NOTES
Doretha Fernandez is an extremely pleasant 47 year old year old female patient here today for f/u h xof melanoma unknown primary.  She went to Dr. Cool for node dissection.1 out of 20 nodes positive.  No adjuvant Radiation.  of brain and PET scan both clear.  Was on  Nivolumab got 8 injection but got colitis.  She went to [place].   She was treated with high dose steroids for colitis and had a dx of bacterial pneumonia.   She was admitted for to hospital for this. She is followed by GI She is off of melanoma meds.  She was also dx with seronegative rheumatoid arthritis and was on humira. Scans havebeen clear. She notes some swelling in left arm where nodes were revmoed.  On prednisone still 7mg.  Associated symptoms: none.  Patient has no other skin complaints today.  Remainder of the HPI, Meds, PMH, Allergies, FH, and SH was reviewed in chart.    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      Anxiety      Basal cell carcinoma      Cervical high risk HPV (human papillomavirus) test positive 2019    See problem list     Colitis      Depression      Diabetes mellitus, iatrogenic (H) 2020     Esophageal reflux      Inflammatory arthritis      Insomnia      Intestinal giardiasis 3/5/2018     Lumbago     left lower back pain     Lymphedema      Malignant melanoma (H)      Melanoma (H) 10/23/2017     Migraines      Mild persistent asthma      Morbid obesity with BMI of 40.0-44.9, adult (H)      STORMY (obstructive sleep apnea)      Prothrombin deficiency (H)     takes 81mg asa daily     Stroke (cerebrum) (H)     During      TIA (transient ischemic attack)      Type 2 diabetes mellitus (H)        Past Surgical History:   Procedure Laterality Date     APPENDECTOMY       COLONOSCOPY N/A 10/18/2017    Procedure: COLONOSCOPY;  Colon;  Surgeon: Debbie Stephens MD;  [facility]     COLONOSCOPY N/A 3/9/2018    Procedure: COMBINED  COLONOSCOPY, SINGLE OR MULTIPLE BIOPSY/POLYPECTOMY BY BIOPSY;  colon;  Surgeon: Benita Schumacher MD;  Location: [facility]     COLONOSCOPY      multiple since 2018 to present - about 6 total     DISSECT LYMPH NODE AXILLA Left 10/23/2017    Procedure: DISSECT LYMPH NODE AXILLA;  Left Axillary Lymph Node Dissection ;  Surgeon: Laurent Cool MD;  [facility]     EXAM UNDER ANESTHESIA PELVIC N/A 2020    Procedure: EXAM UNDER ANESTHESIA, PELVIS; with Cervical Biopsies, Vaginal Biopsy and Endocervical Curettings;  Surgeon: Melina Jung MD;  Location: [facility]     GYN SURGERY  ,          LAPAROSCOPIC ASSISTED COLECTOMY N/A 6/15/2021    Procedure: laparoscopic total abdominal colectomy, end ileostomy;  Surgeon: Quinton Ram MD;  Location: [facility]     REPAIR MOHS Left 2017    Procedure: REPAIR MOHS;  Left Upper Lid Moh's Reconstruction;  Surgeon: Kisha Bosch MD;  [facility]        Family History   Problem Relation Age of Onset     Cancer Mother 45        lung     Neurologic Disorder Mother         epilepsy     Lipids Father      Gastrointestinal Disease Father         diverticulitis      Depression Father      Colitis Father      Colon Cancer Father      Diverticulitis Father      Cancer Maternal Grandmother      Blood Disease Maternal Grandmother         lymphoma      Arthritis Maternal Grandmother      Diabetes Maternal Grandmother      Depression Maternal Grandmother      Macular Degeneration Maternal Grandmother      Glaucoma Maternal Grandmother      Diabetes Maternal Grandfather      Cerebrovascular Disease Maternal Grandfather      Blood Disease Maternal Grandfather      Heart Disease Maternal Grandfather      Glaucoma Maternal Grandfather      Cancer Paternal Grandmother      Cancer - colorectal Paternal Grandmother      Colitis Paternal Grandmother      Colon Cancer Paternal Grandmother      Diverticulitis Paternal Grandmother      Respiratory Paternal Grandfather   "       emphysema      Colitis Paternal Grandfather      Colon Cancer Paternal Grandfather      Diverticulitis Paternal Grandfather      Heart Disease Daughter      Asthma Daughter      Depression Sister      Melanoma No family hx of        Social History     Socioeconomic History     Marital status:      Spouse name: Not on file     Number of children: Not on file     Years of education: Not on file     Highest education level: Not on file   Occupational History     Not on file   Tobacco Use     Smoking status: Former Smoker     Packs/day: 1.00     Years: 5.00     Pack years: 5.00     Quit date: 3/20/1998     Years since quittin.0     Smokeless tobacco: Never Used   Vaping Use     Vaping Use: Never used   Substance and Sexual Activity     Alcohol use: Not Currently     Drug use: No     Sexual activity: Not Currently     Partners: Male     Birth control/protection: Surgical   Other Topics Concern     Parent/sibling w/ CABG, MI or angioplasty before 65F 55M? No   Social History Narrative    20 y.o- patient's mother   of lung cancer. She had to take care of her younger sister.    2012- patient's  had a heart attack with stents placed, followed by cardiac rehabilitation    2000 TO 2012  was in [facility] for depression    2013 patient's  went through alcohol rehabilitation at [facility]            They have attended couple counseling a couple of times and patient went to the family program for chemical dependency.    Patient denies alcohol or drug use and herself            Has 2 children, girls ages 17 and 14. Oldest has been a \"trouble maker.\"     For a while she was working 3 jobs since her  was ill. Works at the [employer] for [employer]. Reports her job is very stressful.         Social Determinants of Health     Financial Resource Strain: High Risk     Difficulty of Paying Living Expenses: " Very hard   Food Insecurity: No Food Insecurity     Worried About Running Out of Food in the Last Year: Never true     Ran Out of Food in the Last Year: Never true   Transportation Needs: No Transportation Needs     Lack of Transportation (Medical): No     Lack of Transportation (Non-Medical): No   Physical Activity: Not on file   Stress: Stress Concern Present     Feeling of Stress : Very much   Social Connections: Unknown     Frequency of Communication with Friends and Family: Not asked     Frequency of Social Gatherings with Friends and Family: Not asked     Attends Nondenominational Services: Not asked     Active Member of Clubs or Organizations: Not asked     Attends Club or Organization Meetings: Not asked     Marital Status:    Intimate Partner Violence: Unknown     Fear of Current or Ex-Partner: Not asked     Emotionally Abused: Not asked     Physically Abused: Not asked     Sexually Abused: Not asked   Housing Stability: Not on file       Outpatient Encounter Medications as of 3/14/2022   Medication Sig Dispense Refill     acetaminophen (TYLENOL) 325 MG tablet Take 3 tablets (975 mg) by mouth every 8 hours As scheduled for the next 7-10 days, then decrease both dose & frequency to as needed there after. 90 tablet 0     Alcohol Swabs (ALCOHOL PREP) 70 % PADS 1 applicator 3 times daily 100 each 0     amLODIPine (NORVASC) 5 MG tablet Take 1 tablet (5 mg) by mouth daily 90 tablet 3     blood glucose (NO BRAND SPECIFIED) lancets standard Use to test blood sugar 3 times daily or as directed. 1 each 0     blood glucose (NO BRAND SPECIFIED) test strip Use to test blood sugar 3 times daily or as directed. 200 strip 0     Calcium Carb-Cholecalciferol 600-800 MG-UNIT TABS Take 800 mg by mouth every morning (before breakfast) 90 tablet 3     calcium carbonate (TUMS) 500 MG chewable tablet Take 1 tablet (500 mg) by mouth 3 times daily as needed for heartburn 30 tablet 0     cyanocobalamin (VITAMIN B-12) 1000 MCG tablet  Take 1 tablet (1,000 mcg) by mouth daily       gabapentin (NEURONTIN) 600 MG tablet TAKE ONE TABLET (600MG) BY MOUTH FOUR TIMES A  tablet 0     hydrOXYzine (ATARAX) 25 MG tablet Take 1 tablet (25 mg) by mouth every 6 hours as needed for anxiety 40 tablet 0     loperamide (IMODIUM A-D) 2 MG tablet Take 2 tabs daily and increase as needed until output is apple sauce consistency. Max of 8 tabs (16 mg) per day. 240 tablet 3     loperamide (IMODIUM A-D) 2 MG tablet Take 2 tablets (4 mg) by mouth 4 times daily as needed for diarrhea 2 tablet 3     metFORMIN (GLUCOPHAGE) 500 MG tablet Take 2 tablets (1,000 mg) by mouth 2 times daily (with meals) 360 tablet 3     omeprazole (PRILOSEC) 40 MG DR capsule Take 1 capsule (40 mg) by mouth daily 30 capsule 5     Ostomy Supplies MISC 20 each daily 20 each 11     pantoprazole (PROTONIX) 40 MG EC tablet Take 1 tablet (40 mg) by mouth every morning (before breakfast) 90 tablet 3     predniSONE (DELTASONE) 1 MG tablet Take 10 mg daily. Taper 1 mg every 2 weeks (Patient taking differently: Take 8 mg by mouth daily Take 10 mg daily. Taper 1 mg every 2 weeks) 120 tablet 2     predniSONE (DELTASONE) 5 MG tablet Take 10 mg daily. Taper 1 mg every 2 weeks (Patient taking differently: Take 8 mg by mouth daily Take 10 mg daily. Taper 1 mg every 2 weeks) 60 tablet 3     simethicone (MYLICON) 80 MG chewable tablet Take 1-2 tablets ( mg) by mouth 4 times daily as needed for cramping 120 tablet 0     triamcinolone (KENALOG) 0.1 % paste 1 Application by Other route       venlafaxine (EFFEXOR) 75 MG tablet TAKE 1 TABLET (75 MG) BY MOUTH 3 TIMES DAILY 90 tablet 2     VITAMIN D3 25 MCG (1000 UT) tablet TAKE THREE TABLETS BY MOUTH ONCE DAILY 300 tablet 1     VITRON-C  MG TABS tablet Take 1 tablet by mouth daily 30 tablet 1     Facility-Administered Encounter Medications as of 3/14/2022   Medication Dose Route Frequency Provider Last Rate Last Admin     lidocaine 1 % 9 mL  9 mL  Intradermal Once Anna Naidu MD         sodium bicarbonate 8.4 % injection 1 mEq  1 mEq Intradermal Once Anna Naidu MD                 O:   NAD, WDWN, Alert & Oriented, Mood & Affect wnl, Vitals stable   Here today alone   General appearance normal   Vitals stable   Alert, oriented and in no acute distress      Following lymph nodes palpated: Occipital, Cervical, Supraclavicular , , axilla inguinal and femoral no lad                               Pink papules on trunk    Stuck on papules and brown macules on trunk and ext   Red papules on trunk  Flesh colored papules on trunk     The remainder of the full exam was normal; the following areas were examined:  conjunctiva/lids, oral mucosa, neck, peripheral vascular system, abdomen, lymph nodes, digits/nails, eccrine and apocrine glands, scalp/hair, face, neck, chest, abdomen, buttocks, back, RUE, LUE, RLE, LLE       Eyes: Conjunctivae/lids:Normal     ENT: Lips, buccal mucosa, tongue: normal    MSK:Normal    Cardiovascular: peripheral edema none    Pulm: Breathing Normal    Lymph Nodes: No Head and Neck Lymphadenopathy     Neuro/Psych: Orientation:Alert and Orientedx3 ; Mood/Affect:normal       A/P:  1. Seborrheic keratosis, lentigo, angioma, dermal nevus, hx of n,sc hx of metastatic melanoma  MELANOMA DISCUSSED WITH PATIENT:  I discussed the specifics of tumor, prognosis, metachronous melanoma, self exam, and genetics with the patient. I explained the need for monthly skin exams including and taught the patient how to do this. Patient was asked about new or changing moles . I discussed with patient signs and symptoms that could arise in the setting of recurrent locoregional or metastatic disease. In addition, the need to undergo every 6 month dermatologic full skin survey and evaluation given that patients with a diagnosis of melanoma are at risk of recurrence (local and distant) and of subsequent de bhumi melanoma.      2. L arm swelling likely from node  dissection   mychart us in 1 month if no change will order ultrasound, last CT scan was clear  It was a pleasure speaking to Doretha Yu today.  Previous clinic notes and pertinent laboratory tests were reviewed prior to Doretha Yu's visit.  BENIGN LESIONS DISCUSSED WITH PATIENT:  I discussed the specifics of tumor, prognosis, and genetics of benign lesions.  I explained that treatment of these lesions would be purely cosmetic and not medically neccessary.  I discussed with patient different removal options including excision, cautery and /or laser.      Nature and genetics of benign skin lesions dicussed with patient.  Signs and Symptoms of skin cancer discussed with patient.  Patient encouraged to perform monthly skin exams.  UV precautions reviewed with patient.  Return to clinic 6 months

## 2022-04-07 ENCOUNTER — TELEPHONE (OUTPATIENT)
Dept: ENDOCRINOLOGY | Facility: CLINIC | Age: 47
End: 2022-04-07
Payer: COMMERCIAL

## 2022-04-07 NOTE — TELEPHONE ENCOUNTER
Per 2/14/22 Dr. Castellanos progress note:    Once you are on prednisone 5 mg daily.      Please check blood work for cortisol and ACTH at 8:00 am in the morning. Don't take prednisone on the morning of the blood work. Last dose of prednisone should be 24 hours prior to the blood work. You can bring the prednisone with you to the lab on the morning of the blood work and take it after blood sample is collected.   Lab scheduling to schedule at any [place] lab locations: [phone] select option 1             Writer reached out to patient to review. Patient is now down to 5 mg daily. Patient is going to schedule 8am labs at her local [facility] lab for next week. Writer reviewed above instructions. Patient verbalizes understanding. Writer will update Dr. Castellanos.      Lorna De La Vega, RN  Endocrine Care Coordinator  [facility]

## 2022-04-07 NOTE — TELEPHONE ENCOUNTER
[facility] Call Center    Phone Message    May a detailed message be left on voicemail: yes     Reason for Call: Other: Patient called to follow up with Dr Castellanos about the labs orders she had placed for patient. Patient stated she is down to the suggested steroid dosage that Dr Castellanos wanted patient to be at before having labs work done again to check patient's levels. Patient wondering if she is okay to have the labs drawn now or should patient wait another week or so due to the medication decrease. Patient stated that she is already experiencing an increase in pain due to the medication decrease. Patient would like a call back to discuss.     Action Taken: Message routed to:  Adult Clinics: Endocrinology p [id number]    Travel Screening: Not Applicable

## 2022-04-14 ENCOUNTER — LAB (OUTPATIENT)
Dept: LAB | Facility: CLINIC | Age: 47
End: 2022-04-14
Payer: COMMERCIAL

## 2022-04-14 DIAGNOSIS — K51.919 ULCERATIVE COLITIS WITH COMPLICATION, UNSPECIFIED LOCATION (H): ICD-10-CM

## 2022-04-14 DIAGNOSIS — E27.40 ADRENAL INSUFFICIENCY (H): ICD-10-CM

## 2022-04-14 LAB
ALBUMIN SERPL-MCNC: 3.5 G/DL (ref 3.4–5)
ALP SERPL-CCNC: 69 U/L (ref 40–150)
ALT SERPL W P-5'-P-CCNC: 34 U/L (ref 0–50)
AST SERPL W P-5'-P-CCNC: 17 U/L (ref 0–45)
BASOPHILS # BLD AUTO: 0.1 10E3/UL (ref 0–0.2)
BASOPHILS NFR BLD AUTO: 1 %
BILIRUB DIRECT SERPL-MCNC: <0.1 MG/DL (ref 0–0.2)
BILIRUB SERPL-MCNC: 0.2 MG/DL (ref 0.2–1.3)
CORTIS SERPL-MCNC: 8.2 UG/DL (ref 4–22)
CRP SERPL-MCNC: 18.3 MG/L (ref 0–8)
EOSINOPHIL # BLD AUTO: 0.4 10E3/UL (ref 0–0.7)
EOSINOPHIL NFR BLD AUTO: 4 %
ERYTHROCYTE [DISTWIDTH] IN BLOOD BY AUTOMATED COUNT: 14.2 % (ref 10–15)
ERYTHROCYTE [SEDIMENTATION RATE] IN BLOOD BY WESTERGREN METHOD: 17 MM/HR (ref 0–20)
HCT VFR BLD AUTO: 37.7 % (ref 35–47)
HGB BLD-MCNC: 12.1 G/DL (ref 11.7–15.7)
LYMPHOCYTES # BLD AUTO: 2.9 10E3/UL (ref 0.8–5.3)
LYMPHOCYTES NFR BLD AUTO: 26 %
MCH RBC QN AUTO: 29.4 PG (ref 26.5–33)
MCHC RBC AUTO-ENTMCNC: 32.1 G/DL (ref 31.5–36.5)
MCV RBC AUTO: 92 FL (ref 78–100)
MONOCYTES # BLD AUTO: 0.9 10E3/UL (ref 0–1.3)
MONOCYTES NFR BLD AUTO: 8 %
NEUTROPHILS # BLD AUTO: 6.9 10E3/UL (ref 1.6–8.3)
NEUTROPHILS NFR BLD AUTO: 62 %
PLATELET # BLD AUTO: 330 10E3/UL (ref 150–450)
PROT SERPL-MCNC: 7.2 G/DL (ref 6.8–8.8)
RBC # BLD AUTO: 4.11 10E6/UL (ref 3.8–5.2)
T4 FREE SERPL-MCNC: 0.86 NG/DL (ref 0.76–1.46)
TSH SERPL DL<=0.005 MIU/L-ACNC: 2.86 MU/L (ref 0.4–4)
WBC # BLD AUTO: 11.1 10E3/UL (ref 4–11)

## 2022-04-14 PROCEDURE — 86140 C-REACTIVE PROTEIN: CPT

## 2022-04-14 PROCEDURE — 82024 ASSAY OF ACTH: CPT

## 2022-04-14 PROCEDURE — 85025 COMPLETE CBC W/AUTO DIFF WBC: CPT

## 2022-04-14 PROCEDURE — 84443 ASSAY THYROID STIM HORMONE: CPT

## 2022-04-14 PROCEDURE — 80076 HEPATIC FUNCTION PANEL: CPT

## 2022-04-14 PROCEDURE — 84439 ASSAY OF FREE THYROXINE: CPT

## 2022-04-14 PROCEDURE — 82533 TOTAL CORTISOL: CPT

## 2022-04-14 PROCEDURE — 85652 RBC SED RATE AUTOMATED: CPT

## 2022-04-14 PROCEDURE — 36415 COLL VENOUS BLD VENIPUNCTURE: CPT

## 2022-04-14 NOTE — RESULT ENCOUNTER NOTE
Doretha,    The thyroid blood work results are within the normal limits.  The cortisol level of 8.2 does not completely exclude the possibility of adrenal insufficiency however the fact that its not very low below 5 is encouraging.  The ACTH level is still pending.  Once the ACTH is back; we will discuss next steps.  It might be reasonable to schedule a follow-up appointment/virtual to discuss this in detail further.  Is the prednisone needed from Rheumatology stand point?     Thank you,     Cary Castellanos MD

## 2022-04-15 ENCOUNTER — MYC MEDICAL ADVICE (OUTPATIENT)
Dept: ENDOCRINOLOGY | Facility: CLINIC | Age: 47
End: 2022-04-15
Payer: COMMERCIAL

## 2022-04-15 LAB — ACTH PLAS-MCNC: <10 PG/ML

## 2022-04-18 NOTE — TELEPHONE ENCOUNTER
I just got an opening on 4/20 at 10 30. Virtual okay. Please schedule the pt at that spot before it is taken.

## 2022-04-18 NOTE — TELEPHONE ENCOUNTER
Patient contacted and scheduled for tomorrow.    Hellen Anderson [facility]  Adult Endocrinology  [facility]

## 2022-04-19 ENCOUNTER — VIRTUAL VISIT (OUTPATIENT)
Dept: ENDOCRINOLOGY | Facility: CLINIC | Age: 47
End: 2022-04-19
Payer: COMMERCIAL

## 2022-04-19 ENCOUNTER — TELEPHONE (OUTPATIENT)
Dept: ENDOCRINOLOGY | Facility: CLINIC | Age: 47
End: 2022-04-19

## 2022-04-19 DIAGNOSIS — E24.2 IATROGENIC CUSHING'S SYNDROME (H): Primary | ICD-10-CM

## 2022-04-19 DIAGNOSIS — E11.69 TYPE 2 DIABETES MELLITUS WITH OTHER SPECIFIED COMPLICATION, WITHOUT LONG-TERM CURRENT USE OF INSULIN (H): ICD-10-CM

## 2022-04-19 DIAGNOSIS — Z79.52 CURRENT CHRONIC USE OF SYSTEMIC STEROIDS: ICD-10-CM

## 2022-04-19 PROCEDURE — 99215 OFFICE O/P EST HI 40 MIN: CPT | Mod: 95 | Performed by: INTERNAL MEDICINE

## 2022-04-19 NOTE — PATIENT INSTRUCTIONS
Doretha,     Adrenal insufficiency is diagnosed when the body is not producing enough cortisol. The problem for the cortisol production could be at the pituitary or adrenal level. You have two risk factors for adrenal insufficiency; #1 chronic high dose steroid use and # 2 past history of immunotherapy that can cause adrenal insufficiency.     The results documented below makes adrenal insufficiency less likely however for additional eval; recommend checking cortisol level at 8:00 am before morning dose of prednisone in 5-6 weeks again.   [facility] Latest Ref Rng & Units 4/14/2022   ACTH <47 pg/mL <10   CORTISOL, SERUM 4.0 - 22.0 ug/dL 8.2   TSH 0.40 - 4.00 mU/L 2.86   T4 FREE 0.76 - 1.46 ng/dL 0.86         In the meantime please continue prednisone 5 mg daily.     Please check blood work for cortisol in 5-6 weeks at 8:00 am in the morning. Don't take prednisone on the morning of the blood work. Last dose of prednisone should be 24 hours prior to the blood work. You can bring the prednisone with you to the lab on the morning of the blood work and take it after blood sample is collected.   Lab scheduling to schedule at any [place] lab locations: [phone] select option 1

## 2022-04-19 NOTE — PROGRESS NOTES
Doretha is a 47 year old who is being evaluated via a billable video visit.      How would you like to obtain your AVS? Cellectarhart  If the video visit is dropped, the invitation should be resent by: Text to cell phone: [phone]  Will anyone else be joining your video visit? No

## 2022-04-19 NOTE — PROGRESS NOTES
Endocrinology virtual Visit    Chief Complaint: Follow Up and Video Visit     Information obtained from:Patient      Assessment/Treatment Plan:      # 1 Iatrogenic cushing's syndrome -from longstanding steroid treatment-4years.  80pounds weight gain, developed type 2 diabetes, obstructive sleep apnea, purple significant stretch marks.  Now steroid reduced to prednisone 5 mg daily. Check DEXA scan.  Next steps - see #2    #2 Question of iatrogenic adrenal insufficiency (AI) as we are reducing the prednisone to 5 mg daily-physiologic dose: risk factors for AI: previous treatment with Nivolumab for metastatic melanoma which can cause AI and chronic high dose steroid treatment (4 years).  Once she was on prednisone of 5 mg daily; we checked a morning cortisol at 8 AM prior to prednisone dose which was within the normal limits at 8.2. with ACTH of less than 10. These results makes adrenal insufficiency less likely however does not completely rule it out.  Recommended to continue prednisone 5 mg daily.  Recheck morning cortisol at 8 AM in a few weeks.      ENDO PITUITARY LABS-[facility] Latest Ref Rng & Units 4/14/2022   ACTH <47 pg/mL <10   CORTISOL, SERUM 4.0 - 22.0 ug/dL 8.2   TSH 0.40 - 4.00 mU/L 2.86   T4 FREE 0.76 - 1.46 ng/dL 0.86     Diabetes/streoid induced/Type 2: currently controlled with metformin.  We expect steroid-induced diabetes to improve further as the steroid dose is being reduced.    Patient Instructions       Doretha,     Adrenal insufficiency is diagnosed when the body is not producing enough cortisol. The problem for the cortisol production could be at the pituitary or adrenal level. You have two risk factors for adrenal insufficiency; #1 chronic high dose steroid use and # 2 past history of immunotherapy that can cause adrenal insufficiency.     The results documented below makes adrenal insufficiency less likely however for additional eval; recommend checking cortisol level at 8:00 am before morning dose  of prednisone in 5-6 weeks again.   ENDO PITUITARY LABS-[facility] Latest Ref Rng & Units 4/14/2022   ACTH <47 pg/mL <10   CORTISOL, SERUM 4.0 - 22.0 ug/dL 8.2   TSH 0.40 - 4.00 mU/L 2.86   T4 FREE 0.76 - 1.46 ng/dL 0.86         In the meantime please continue prednisone 5 mg daily.     Please check blood work for cortisol in 5-6 weeks at 8:00 am in the morning. Don't take prednisone on the morning of the blood work. Last dose of prednisone should be 24 hours prior to the blood work. You can bring the prednisone with you to the lab on the morning of the blood work and take it after blood sample is collected.   Lab scheduling to schedule at any [place] lab locations: [phone] select option 1          Return to clinic in 3-4 months.    Test and/or medications prescribed today:  Orders Placed This Encounter   Procedures     Dexa hip/pelvis/spine     Cortisol         Cary Castellanos MD  Staff Endocrinologist    Division of Endocrinology and Diabetes      Subjective:         HPI: Doretha Yu is a 47 year old female who is here for a follow up.     Diagnosed with metastatic melanoma about 4 years ago; treated with nivolumab at the time.  Has been on high-dose steroid treatment since then.  Sent to endocrinology clinic for assessment of adrenal insufficiency and for the slow taper of prednisone.    Reports that over the last 4 years she has developed rheumatoid arthritis, status post colectomy and multiple medical issues reviewed below including diabetes.  Reports that every time the prednisone dose goal below 5mg she develops acute symptoms that leads to hospitalization.  The lowest dose she has been on was 2.5 mg.      Currently she is on prednisone 5 mg daily which was tapered down from a higher dose a few weeks ago.  Reports that every time she is tapering down she experiences worsening of joint pains. Pain clinic follow ups in the past. Gained 80 poiunds in 4 years due to steroids.    Lymphedema  or the left arm.  Diagnosed with iatrogenic cushing's based on Gained 80 pounds, STORMY, diabetes and high blood pressure in the past.         Allergies   Allergen Reactions     Bee Venom Swelling     Azithromycin Diarrhea     Erythromycin      Other reaction(s): GI intolerance, Vomiting     Fentanyl Other (See Comments)     sweating  sweating     Prochlorperazine Fatigue     Other reaction(s): Other (see comments)  Fatigue     Buspirone      Other reaction(s): GI intolerance  vomiting     Erythrocin Nausea and Vomiting     Zithromax [Azithromycin Dihydrate] Diarrhea     Enbrel [Etanercept] Hives and Rash       Current Outpatient Medications   Medication Sig Dispense Refill     acetaminophen (TYLENOL) 325 MG tablet Take 3 tablets (975 mg) by mouth every 8 hours As scheduled for the next 7-10 days, then decrease both dose & frequency to as needed there after. 90 tablet 0     Alcohol Swabs (ALCOHOL PREP) 70 % PADS 1 applicator 3 times daily 100 each 0     amLODIPine (NORVASC) 5 MG tablet Take 1 tablet (5 mg) by mouth daily 90 tablet 3     blood glucose (NO BRAND SPECIFIED) lancets standard Use to test blood sugar 3 times daily or as directed. 1 each 0     blood glucose (NO BRAND SPECIFIED) test strip Use to test blood sugar 3 times daily or as directed. 200 strip 0     Calcium Carb-Cholecalciferol 600-800 MG-UNIT TABS Take 800 mg by mouth every morning (before breakfast) 90 tablet 3     calcium carbonate (TUMS) 500 MG chewable tablet Take 1 tablet (500 mg) by mouth 3 times daily as needed for heartburn 30 tablet 0     cyanocobalamin (VITAMIN B-12) 1000 MCG tablet Take 1 tablet (1,000 mcg) by mouth daily       gabapentin (NEURONTIN) 600 MG tablet TAKE ONE TABLET (600MG) BY MOUTH FOUR TIMES A  tablet 0     hydrOXYzine (ATARAX) 25 MG tablet Take 1 tablet (25 mg) by mouth every 6 hours as needed for anxiety 40 tablet 0     loperamide (IMODIUM A-D) 2 MG tablet Take 2 tabs daily and increase as needed until output is  apple sauce consistency. Max of 8 tabs (16 mg) per day. 240 tablet 3     loperamide (IMODIUM A-D) 2 MG tablet Take 2 tablets (4 mg) by mouth 4 times daily as needed for diarrhea 2 tablet 3     metFORMIN (GLUCOPHAGE) 500 MG tablet Take 2 tablets (1,000 mg) by mouth 2 times daily (with meals) 360 tablet 3     Ostomy Supplies MISC 20 each daily 20 each 11     pantoprazole (PROTONIX) 40 MG EC tablet Take 1 tablet (40 mg) by mouth every morning (before breakfast) 90 tablet 3     predniSONE (DELTASONE) 1 MG tablet Take 10 mg daily. Taper 1 mg every 2 weeks (Patient taking differently: Take 8 mg by mouth daily Take 10 mg daily. Taper 1 mg every 2 weeks) 120 tablet 2     predniSONE (DELTASONE) 5 MG tablet Take 10 mg daily. Taper 1 mg every 2 weeks (Patient taking differently: Take 8 mg by mouth daily Take 10 mg daily. Taper 1 mg every 2 weeks) 60 tablet 3     simethicone (MYLICON) 80 MG chewable tablet Take 1-2 tablets ( mg) by mouth 4 times daily as needed for cramping 120 tablet 0     venlafaxine (EFFEXOR) 75 MG tablet TAKE 1 TABLET (75 MG) BY MOUTH 3 TIMES DAILY 90 tablet 2     VITAMIN D3 25 MCG (1000 UT) tablet TAKE THREE TABLETS BY MOUTH ONCE DAILY 300 tablet 1     VITRON-C  MG TABS tablet Take 1 tablet by mouth daily 30 tablet 1     omeprazole (PRILOSEC) 40 MG DR capsule Take 1 capsule (40 mg) by mouth daily (Patient not taking: Reported on 4/19/2022) 30 capsule 5     triamcinolone (KENALOG) 0.1 % paste 1 Application by Other route (Patient not taking: Reported on 4/19/2022)         Review of Systems     8 point review system (Constitutional, HENT, Eyes, Respiratory, Cardiovascular, Gastrointestinal, Genitourinary, Musculoskeletal,Neurological, Psychiatric/Behavioural, Endocrine) is negative or is as per HPI above      Objective:     ENDO VITALS-[facility] 3/14/2022   /85   Pulse 102   SpO2 95     GENERAL: alert and no distress, moon faces.   EYES: Eyes grossly normal to inspection.  No discharge or  erythema, or obvious scleral/conjunctival abnormalities.  RESP: No audible wheeze, cough, or visible cyanosis.  NEURO: alert and oriented.   PSYCH: Mentation appears normal, affect normal/bright, judgement and insight intact, normal speech.    In House Labs:   Lab Results   Component Value Date    A1C 7.0 02/07/2022    A1C 7.5 06/16/2021    A1C 6.5 05/11/2021    A1C Canceled, Test credited 05/11/2021    A1C 6.1 04/05/2021    A1C 6.3 06/04/2020       TSH   Date Value Ref Range Status   04/14/2022 2.86 0.40 - 4.00 mU/L Final   10/04/2021 1.04 0.40 - 4.00 mU/L Final   06/09/2020 2.5 0.3 - 4.2 mIU/L Final   01/27/2020 1.90 0.40 - 4.00 mU/L Final   12/18/2018 0.7 0.3 - 4.2 mIU/L Final   02/15/2018 1.31 0.40 - 4.00 mU/L Final   01/18/2018 1.24 0.40 - 4.00 mU/L Final     T4 Free   Date Value Ref Range Status   10/04/2017 0.90 0.76 - 1.46 ng/dL Final     Free T4   Date Value Ref Range Status   04/14/2022 0.86 0.76 - 1.46 ng/dL Final       Creatinine   Date Value Ref Range Status   02/07/2022 0.75 0.52 - 1.04 mg/dL Final   06/17/2021 0.65 0.52 - 1.04 mg/dL Final       This note has been dictated using voice recognition software.  As a result, there may be errors in the documentation that have gone undetected.  Please consider this when interpreting information in this documentation.       Video-Visit Details    Type of service:  Video and telephone time   All times are in your local time  Video time = 26 minutes.   Platform used for Video Visit: Gabriel  41 minutes spent on the date of the encounter doing chart review (including outside records), history, documentation and further activities per the note.

## 2022-04-19 NOTE — LETTER
4/19/2022         RE: Doretha Yu  [address]  [place] [facility] [place] [address]        Dear Colleague,    Thank you for referring your patient, Doretha Yu, to the [facility]. Please see a copy of my visit note below.    Doretha is a 47 year old who is being evaluated via a billable video visit.      How would you like to obtain your AVS? MyChart  If the video visit is dropped, the invitation should be resent by: Text to cell phone: [phone]  Will anyone else be joining your video visit? No      Endocrinology virtual Visit    Chief Complaint: Follow Up and Video Visit     Information obtained from:Patient      Assessment/Treatment Plan:      # 1 Iatrogenic cushing's syndrome -from longstanding steroid treatment-4years.  80pounds weight gain, developed type 2 diabetes, obstructive sleep apnea, purple significant stretch marks.  Now steroid reduced to prednisone 5 mg daily. Check DEXA scan.  Next steps - see #2    #2 Question of iatrogenic adrenal insufficiency (AI) as we are reducing the prednisone to 5 mg daily-physiologic dose: risk factors for AI: previous treatment with Nivolumab for metastatic melanoma which can cause AI and chronic high dose steroid treatment (4 years).  Once she was on prednisone of 5 mg daily; we checked a morning cortisol at 8 AM prior to prednisone dose which was within the normal limits at 8.2. with ACTH of less than 10. These results makes adrenal insufficiency less likely however does not completely rule it out.  Recommended to continue prednisone 5 mg daily.  Recheck morning cortisol at 8 AM in a few weeks.      ENDO PITUITARY LABS-[facility] Latest Ref Rng & Units 4/14/2022   ACTH <47 pg/mL <10   CORTISOL, SERUM 4.0 - 22.0 ug/dL 8.2   TSH 0.40 - 4.00 mU/L 2.86   T4 FREE 0.76 - 1.46 ng/dL 0.86     Diabetes/streoid induced/Type 2: currently controlled with metformin.  We expect steroid-induced diabetes to improve further as the steroid dose is being  reduced.    Patient Instructions       Doretha,     Adrenal insufficiency is diagnosed when the body is not producing enough cortisol. The problem for the cortisol production could be at the pituitary or adrenal level. You have two risk factors for adrenal insufficiency; #1 chronic high dose steroid use and # 2 past history of immunotherapy that can cause adrenal insufficiency.     The results documented below makes adrenal insufficiency less likely however for additional eval; recommend checking cortisol level at 8:00 am before morning dose of prednisone in 5-6 weeks again.   ENDO PITUITARY LABS-[facility] Latest Ref Rng & Units 4/14/2022   ACTH <47 pg/mL <10   CORTISOL, SERUM 4.0 - 22.0 ug/dL 8.2   TSH 0.40 - 4.00 mU/L 2.86   T4 FREE 0.76 - 1.46 ng/dL 0.86         In the meantime please continue prednisone 5 mg daily.     Please check blood work for cortisol in 5-6 weeks at 8:00 am in the morning. Don't take prednisone on the morning of the blood work. Last dose of prednisone should be 24 hours prior to the blood work. You can bring the prednisone with you to the lab on the morning of the blood work and take it after blood sample is collected.   Lab scheduling to schedule at any [place] lab locations: [phone] select option [phone]          Return to clinic in 3-4 months.    Test and/or medications prescribed today:  Orders Placed This Encounter   Procedures     Dexa hip/pelvis/spine     Cortisol         Cary Castellanos MD  Staff Endocrinologist    Division of Endocrinology and Diabetes      Subjective:         HPI: Doretha Yu is a 47 year old female who is here for a follow up.     Diagnosed with metastatic melanoma about 4 years ago; treated with nivolumab at the time.  Has been on high-dose steroid treatment since then.  Sent to endocrinology clinic for assessment of adrenal insufficiency and for the slow taper of prednisone.    Reports that over the last 4 years she has developed rheumatoid  arthritis, status post colectomy and multiple medical issues reviewed below including diabetes.  Reports that every time the prednisone dose goal below 5mg she develops acute symptoms that leads to hospitalization.  The lowest dose she has been on was 2.5 mg.      Currently she is on prednisone 5 mg daily which was tapered down from a higher dose a few weeks ago.  Reports that every time she is tapering down she experiences worsening of joint pains. Pain clinic follow ups in the past. Gained 80 poiunds in 4 years due to steroids.    Lymphedema or the left arm.  Diagnosed with iatrogenic cushing's based on Gained 80 pounds, STORMY, diabetes and high blood pressure in the past.         Allergies   Allergen Reactions     Bee Venom Swelling     Azithromycin Diarrhea     Erythromycin      Other reaction(s): GI intolerance, Vomiting     Fentanyl Other (See Comments)     sweating  sweating     Prochlorperazine Fatigue     Other reaction(s): Other (see comments)  Fatigue     Buspirone      Other reaction(s): GI intolerance  vomiting     Erythrocin Nausea and Vomiting     Zithromax [Azithromycin Dihydrate] Diarrhea     Enbrel [Etanercept] Hives and Rash       Current Outpatient Medications   Medication Sig Dispense Refill     acetaminophen (TYLENOL) 325 MG tablet Take 3 tablets (975 mg) by mouth every 8 hours As scheduled for the next 7-10 days, then decrease both dose & frequency to as needed there after. 90 tablet 0     Alcohol Swabs (ALCOHOL PREP) 70 % PADS 1 applicator 3 times daily 100 each 0     amLODIPine (NORVASC) 5 MG tablet Take 1 tablet (5 mg) by mouth daily 90 tablet 3     blood glucose (NO BRAND SPECIFIED) lancets standard Use to test blood sugar 3 times daily or as directed. 1 each 0     blood glucose (NO BRAND SPECIFIED) test strip Use to test blood sugar 3 times daily or as directed. 200 strip 0     Calcium Carb-Cholecalciferol 600-800 MG-UNIT TABS Take 800 mg by mouth every morning (before breakfast) 90 tablet  3     calcium carbonate (TUMS) 500 MG chewable tablet Take 1 tablet (500 mg) by mouth 3 times daily as needed for heartburn 30 tablet 0     cyanocobalamin (VITAMIN B-12) 1000 MCG tablet Take 1 tablet (1,000 mcg) by mouth daily       gabapentin (NEURONTIN) 600 MG tablet TAKE ONE TABLET (600MG) BY MOUTH FOUR TIMES A  tablet 0     hydrOXYzine (ATARAX) 25 MG tablet Take 1 tablet (25 mg) by mouth every 6 hours as needed for anxiety 40 tablet 0     loperamide (IMODIUM A-D) 2 MG tablet Take 2 tabs daily and increase as needed until output is apple sauce consistency. Max of 8 tabs (16 mg) per day. 240 tablet 3     loperamide (IMODIUM A-D) 2 MG tablet Take 2 tablets (4 mg) by mouth 4 times daily as needed for diarrhea 2 tablet 3     metFORMIN (GLUCOPHAGE) 500 MG tablet Take 2 tablets (1,000 mg) by mouth 2 times daily (with meals) 360 tablet 3     Ostomy Supplies MISC 20 each daily 20 each 11     pantoprazole (PROTONIX) 40 MG EC tablet Take 1 tablet (40 mg) by mouth every morning (before breakfast) 90 tablet 3     predniSONE (DELTASONE) 1 MG tablet Take 10 mg daily. Taper 1 mg every 2 weeks (Patient taking differently: Take 8 mg by mouth daily Take 10 mg daily. Taper 1 mg every 2 weeks) 120 tablet 2     predniSONE (DELTASONE) 5 MG tablet Take 10 mg daily. Taper 1 mg every 2 weeks (Patient taking differently: Take 8 mg by mouth daily Take 10 mg daily. Taper 1 mg every 2 weeks) 60 tablet 3     simethicone (MYLICON) 80 MG chewable tablet Take 1-2 tablets ( mg) by mouth 4 times daily as needed for cramping 120 tablet 0     venlafaxine (EFFEXOR) 75 MG tablet TAKE 1 TABLET (75 MG) BY MOUTH 3 TIMES DAILY 90 tablet 2     VITAMIN D3 25 MCG (1000 UT) tablet TAKE THREE TABLETS BY MOUTH ONCE DAILY 300 tablet 1     VITRON-C  MG TABS tablet Take 1 tablet by mouth daily 30 tablet 1     omeprazole (PRILOSEC) 40 MG DR capsule Take 1 capsule (40 mg) by mouth daily (Patient not taking: Reported on 4/19/2022) 30 capsule 5      triamcinolone (KENALOG) 0.1 % paste 1 Application by Other route (Patient not taking: Reported on 4/19/2022)         Review of Systems     8 point review system (Constitutional, HENT, Eyes, Respiratory, Cardiovascular, Gastrointestinal, Genitourinary, Musculoskeletal,Neurological, Psychiatric/Behavioural, Endocrine) is negative or is as per HPI above      Objective:     ENDO VITALS-[facility] 3/14/2022   /85   Pulse 102   SpO2 95     GENERAL: alert and no distress, moon faces.   EYES: Eyes grossly normal to inspection.  No discharge or erythema, or obvious scleral/conjunctival abnormalities.  RESP: No audible wheeze, cough, or visible cyanosis.  NEURO: alert and oriented.   PSYCH: Mentation appears normal, affect normal/bright, judgement and insight intact, normal speech.    In House Labs:   Lab Results   Component Value Date    A1C 7.0 02/07/2022    A1C 7.5 06/16/2021    A1C 6.5 05/11/2021    A1C Canceled, Test credited 05/11/2021    A1C 6.1 04/05/2021    A1C 6.3 06/04/2020       TSH   Date Value Ref Range Status   04/14/2022 2.86 0.40 - 4.00 mU/L Final   10/04/2021 1.04 0.40 - 4.00 mU/L Final   06/09/2020 2.5 0.3 - 4.2 mIU/L Final   01/27/2020 1.90 0.40 - 4.00 mU/L Final   12/18/2018 0.7 0.3 - 4.2 mIU/L Final   02/15/2018 1.31 0.40 - 4.00 mU/L Final   01/18/2018 1.24 0.40 - 4.00 mU/L Final     T4 Free   Date Value Ref Range Status   10/04/2017 0.90 0.76 - 1.46 ng/dL Final     Free T4   Date Value Ref Range Status   04/14/2022 0.86 0.76 - 1.46 ng/dL Final       Creatinine   Date Value Ref Range Status   02/07/2022 0.75 0.52 - 1.04 mg/dL Final   06/17/2021 0.65 0.52 - 1.04 mg/dL Final       This note has been dictated using voice recognition software.  As a result, there may be errors in the documentation that have gone undetected.  Please consider this when interpreting information in this documentation.       Video-Visit Details    Type of service:  Video and telephone time   All times are in your local time  Video  time = 26 minutes.   Platform used for Video Visit: RoboCent  41 minutes spent on the date of the encounter doing chart review (including outside records), history, documentation and further activities per the note.      Again, thank you for allowing me to participate in the care of your patient.        Sincerely,        Cary Castellanos MD

## 2022-04-28 ENCOUNTER — HOSPITAL ENCOUNTER (OUTPATIENT)
Dept: BONE DENSITY | Facility: CLINIC | Age: 47
Discharge: HOME | End: 2022-04-28
Attending: INTERNAL MEDICINE | Admitting: INTERNAL MEDICINE
Payer: COMMERCIAL

## 2022-04-28 DIAGNOSIS — Z79.52 CURRENT CHRONIC USE OF SYSTEMIC STEROIDS: ICD-10-CM

## 2022-04-28 PROCEDURE — 77080 DXA BONE DENSITY AXIAL: CPT

## 2022-04-28 NOTE — RESULT ENCOUNTER NOTE
Doretha,    The DEXA scan results were normal.  Please refer to the detailed report.    Cary Castellanos MD

## 2022-05-01 ENCOUNTER — HEALTH MAINTENANCE LETTER (OUTPATIENT)
Age: 47
End: 2022-05-01

## 2022-05-09 NOTE — NURSING NOTE
"Oncology Rooming Note    October 13, 2017 10:12 AM   Doretha Fernandez is a 42 year old female who presents for:    Chief Complaint   Patient presents with     Oncology Clinic Visit     Rl- New     Initial Vitals: /75  Pulse 82  Temp 98.6  F (37  C) (Oral)  Resp 16  Ht 1.6 m (5' 2.99\")  Wt 94.6 kg (208 lb 8 oz)  LMP 10/07/2017  SpO2 98%  BMI 36.94 kg/m2 Estimated body mass index is 36.94 kg/(m^2) as calculated from the following:    Height as of this encounter: 1.6 m (5' 2.99\").    Weight as of this encounter: 94.6 kg (208 lb 8 oz). Body surface area is 2.05 meters squared.  Mild Pain (3) Comment: left biospy infection   Patient's last menstrual period was 10/07/2017.  Allergies reviewed: Yes  Medications reviewed: Yes    Medications: Medication refills not needed today.  Pharmacy name entered into [facility]: [facility] [place], [place] - [address]    Clinical concerns: New patient. States left side biopsy is infected.     10 minutes for nursing intake (face to face time)     Darya Isidro LPN            " Wt Readings from Last 3 Encounters:   08/11/21 68 8 kg (151 lb 9 6 oz)   08/03/21 70 3 kg (155 lb)   08/02/21 70 3 kg (155 lb)     · Hypovolemic on exam  · Received 30 cc milligram per kilogram of IVF due to meeting severe sepsis  · Hold home HCTZ due to SHERLYN  · Prior echo July 2021 EF 50-55 percent, grade 1 diastolic dysfunction  · Monitor I/O and daily weights  · Continue to monitor for signs fluid overload

## 2022-05-12 DIAGNOSIS — G89.29 OTHER CHRONIC PAIN: ICD-10-CM

## 2022-05-12 RX ORDER — GABAPENTIN 600 MG/1
TABLET ORAL
Qty: 120 TABLET | Refills: 5 | Status: SHIPPED | OUTPATIENT
Start: 2022-05-12 | End: 2022-08-16

## 2022-05-12 NOTE — TELEPHONE ENCOUNTER
Pending Prescriptions:                       Disp   Refills    gabapentin (NEURONTIN) 600 MG tablet      120 ta*0          Routing refill request to provider for review/approval because:  Drug not on the [facility] refill protocol       Carlo Thomas RN

## 2022-05-16 ENCOUNTER — TELEPHONE (OUTPATIENT)
Dept: FAMILY MEDICINE | Facility: CLINIC | Age: 47
End: 2022-05-16
Payer: COMMERCIAL

## 2022-05-16 NOTE — TELEPHONE ENCOUNTER
Reason for Call: Request for an order or referral:    Order or referral being requested: Patient is asking if she can be seen this week by  there is nothing open and she said she only wants PCP.   She also is asking for a referral to lymphedema.     Date needed: at your convenience    Has the patient been seen by the PCP for this problem? YES    Phone number Patient can be reached at:  Home number on file [phone] (home)    Best Time:  any    Can we leave a detailed message on this number?  YES    Call taken on 5/16/2022 at 4:52 PM by Vonda Champagne

## 2022-05-16 NOTE — TELEPHONE ENCOUNTER
Patient has multiple co-morbidities.  Is having pain in all joints now that she is at the end of her prednisone taper.  Patient is also having trouble controlling her lymphedema and it is causing her intermittent pain over the last 3 weeks.  Patient stated she does not want to go to Urgent Care for evaluation.  Denies chest pain  Denies SOB  Denies any other symptoms.  Patient was scheduled with Dr Evangelista Chan 9/18/22 at 0820 arrival time for lymphedema referral and prednisone medication review.    Miguel MARR [facility]

## 2022-05-18 ENCOUNTER — TELEPHONE (OUTPATIENT)
Dept: FAMILY MEDICINE | Facility: CLINIC | Age: 47
End: 2022-05-18

## 2022-05-18 ENCOUNTER — OFFICE VISIT (OUTPATIENT)
Dept: FAMILY MEDICINE | Facility: CLINIC | Age: 47
End: 2022-05-18
Payer: COMMERCIAL

## 2022-05-18 VITALS
SYSTOLIC BLOOD PRESSURE: 114 MMHG | DIASTOLIC BLOOD PRESSURE: 80 MMHG | TEMPERATURE: 97.8 F | RESPIRATION RATE: 14 BRPM | HEART RATE: 86 BPM | OXYGEN SATURATION: 98 % | BODY MASS INDEX: 44.3 KG/M2 | WEIGHT: 250 LBS

## 2022-05-18 DIAGNOSIS — G89.29 OTHER CHRONIC PAIN: ICD-10-CM

## 2022-05-18 DIAGNOSIS — C43.9 METASTATIC MALIGNANT MELANOMA (H): ICD-10-CM

## 2022-05-18 DIAGNOSIS — M06.00 RHEUMATOID ARTHRITIS WITH NEGATIVE RHEUMATOID FACTOR, INVOLVING UNSPECIFIED SITE (H): ICD-10-CM

## 2022-05-18 DIAGNOSIS — D68.52 PROTHROMBIN MUTATION (H): ICD-10-CM

## 2022-05-18 DIAGNOSIS — E66.01 MORBID OBESITY (H): ICD-10-CM

## 2022-05-18 DIAGNOSIS — F41.1 ANXIETY STATE: ICD-10-CM

## 2022-05-18 DIAGNOSIS — Z93.2 ILEOSTOMY STATUS (H): ICD-10-CM

## 2022-05-18 DIAGNOSIS — I89.0 SECONDARY LYMPHEDEMA: Primary | ICD-10-CM

## 2022-05-18 DIAGNOSIS — F32.0 MILD MAJOR DEPRESSION (H): ICD-10-CM

## 2022-05-18 DIAGNOSIS — E24.2 IATROGENIC CUSHING'S SYNDROME (H): ICD-10-CM

## 2022-05-18 DIAGNOSIS — G62.0 DRUG-INDUCED POLYNEUROPATHY (H): ICD-10-CM

## 2022-05-18 DIAGNOSIS — Z12.31 VISIT FOR SCREENING MAMMOGRAM: ICD-10-CM

## 2022-05-18 DIAGNOSIS — E13.9 DIABETES MELLITUS, IATROGENIC (H): ICD-10-CM

## 2022-05-18 DIAGNOSIS — D84.9 IMMUNOSUPPRESSED STATUS (H): ICD-10-CM

## 2022-05-18 DIAGNOSIS — K51.919 ULCERATIVE COLITIS WITH COMPLICATION, UNSPECIFIED LOCATION (H): ICD-10-CM

## 2022-05-18 DIAGNOSIS — Z79.899 ENCOUNTER FOR LONG-TERM (CURRENT) USE OF MEDICATIONS: ICD-10-CM

## 2022-05-18 LAB
ALBUMIN SERPL-MCNC: 3.5 G/DL (ref 3.4–5)
ALP SERPL-CCNC: 80 U/L (ref 40–150)
ALT SERPL W P-5'-P-CCNC: 53 U/L (ref 0–50)
ANION GAP SERPL CALCULATED.3IONS-SCNC: 10 MMOL/L (ref 3–14)
AST SERPL W P-5'-P-CCNC: 27 U/L (ref 0–45)
BILIRUB SERPL-MCNC: 0.2 MG/DL (ref 0.2–1.3)
BUN SERPL-MCNC: 15 MG/DL (ref 7–30)
CALCIUM SERPL-MCNC: 9.2 MG/DL (ref 8.5–10.1)
CHLORIDE BLD-SCNC: 104 MMOL/L (ref 94–109)
CHOLEST SERPL-MCNC: 253 MG/DL
CO2 SERPL-SCNC: 24 MMOL/L (ref 20–32)
CORTIS SERPL-MCNC: 7.3 UG/DL (ref 4–22)
CREAT SERPL-MCNC: 0.82 MG/DL (ref 0.52–1.04)
CREAT UR-MCNC: 290 MG/DL
CREAT UR-MCNC: 290 MG/DL
FASTING STATUS PATIENT QL REPORTED: NO
GFR SERPL CREATININE-BSD FRML MDRD: 89 ML/MIN/1.73M2
GLUCOSE BLD-MCNC: 124 MG/DL (ref 70–99)
HBA1C MFR BLD: 6.9 % (ref 0–5.6)
HDLC SERPL-MCNC: 43 MG/DL
LDLC SERPL CALC-MCNC: 138 MG/DL
MICROALBUMIN UR-MCNC: 13 MG/L
MICROALBUMIN/CREAT UR: 4.48 MG/G CR (ref 0–25)
NONHDLC SERPL-MCNC: 210 MG/DL
POTASSIUM BLD-SCNC: 4 MMOL/L (ref 3.4–5.3)
PROT SERPL-MCNC: 7.7 G/DL (ref 6.8–8.8)
SODIUM SERPL-SCNC: 138 MMOL/L (ref 133–144)
TRIGL SERPL-MCNC: 358 MG/DL

## 2022-05-18 PROCEDURE — 80061 LIPID PANEL: CPT | Performed by: FAMILY MEDICINE

## 2022-05-18 PROCEDURE — 80307 DRUG TEST PRSMV CHEM ANLYZR: CPT | Performed by: FAMILY MEDICINE

## 2022-05-18 PROCEDURE — 83036 HEMOGLOBIN GLYCOSYLATED A1C: CPT | Performed by: FAMILY MEDICINE

## 2022-05-18 PROCEDURE — 82533 TOTAL CORTISOL: CPT | Performed by: FAMILY MEDICINE

## 2022-05-18 PROCEDURE — 82043 UR ALBUMIN QUANTITATIVE: CPT | Performed by: FAMILY MEDICINE

## 2022-05-18 PROCEDURE — 99214 OFFICE O/P EST MOD 30 MIN: CPT | Performed by: FAMILY MEDICINE

## 2022-05-18 PROCEDURE — 36415 COLL VENOUS BLD VENIPUNCTURE: CPT | Performed by: FAMILY MEDICINE

## 2022-05-18 PROCEDURE — 80053 COMPREHEN METABOLIC PANEL: CPT | Performed by: FAMILY MEDICINE

## 2022-05-18 RX ORDER — VENLAFAXINE 75 MG/1
150 TABLET ORAL 2 TIMES DAILY
Qty: 120 TABLET | Refills: 5 | Status: SHIPPED | OUTPATIENT
Start: 2022-05-18 | End: 2022-12-08

## 2022-05-18 RX ORDER — VENLAFAXINE 75 MG/1
150 TABLET ORAL 2 TIMES DAILY
Qty: 120 TABLET | Refills: 5 | Status: CANCELLED | OUTPATIENT
Start: 2022-05-18

## 2022-05-18 ASSESSMENT — ASTHMA QUESTIONNAIRES
QUESTION_1 LAST FOUR WEEKS HOW MUCH OF THE TIME DID YOUR ASTHMA KEEP YOU FROM GETTING AS MUCH DONE AT WORK, SCHOOL OR AT HOME: NONE OF THE TIME
QUESTION_5 LAST FOUR WEEKS HOW WOULD YOU RATE YOUR ASTHMA CONTROL: SOMEWHAT CONTROLLED
QUESTION_2 LAST FOUR WEEKS HOW OFTEN HAVE YOU HAD SHORTNESS OF BREATH: MORE THAN ONCE A DAY
QUESTION_4 LAST FOUR WEEKS HOW OFTEN HAVE YOU USED YOUR RESCUE INHALER OR NEBULIZER MEDICATION (SUCH AS ALBUTEROL): NOT AT ALL
ACT_TOTALSCORE: 19
ACT_TOTALSCORE: 19
QUESTION_3 LAST FOUR WEEKS HOW OFTEN DID YOUR ASTHMA SYMPTOMS (WHEEZING, COUGHING, SHORTNESS OF BREATH, CHEST TIGHTNESS OR PAIN) WAKE YOU UP AT NIGHT OR EARLIER THAN USUAL IN THE MORNING: NOT AT ALL

## 2022-05-18 ASSESSMENT — PATIENT HEALTH QUESTIONNAIRE - PHQ9
SUM OF ALL RESPONSES TO PHQ QUESTIONS 1-9: 10
SUM OF ALL RESPONSES TO PHQ QUESTIONS 1-9: 10
10. IF YOU CHECKED OFF ANY PROBLEMS, HOW DIFFICULT HAVE THESE PROBLEMS MADE IT FOR YOU TO DO YOUR WORK, TAKE CARE OF THINGS AT HOME, OR GET ALONG WITH OTHER PEOPLE: SOMEWHAT DIFFICULT

## 2022-05-18 ASSESSMENT — PAIN SCALES - GENERAL: PAINLEVEL: EXTREME PAIN (8)

## 2022-05-18 NOTE — PROGRESS NOTES
Assessment & Plan     Secondary lymphedema   wants to see lymphedema therapy again for her left arm  - Physical Therapy Referral; Future    Rheumatoid arthritis with negative rheumatoid factor, involving unspecified site (H)  Will be seeing rheum at [facility] in June.  Is down to 5 mg/day of prednisone, working hard to get off altogether     Mild major depression (H)   increase effexor to 150 mg twice daily   - venlafaxine (EFFEXOR) 75 MG tablet; Take 2 tablets (150 mg) by mouth 2 times daily    Morbid obesity (H)  Working on weight loss, discouraged by recent 10 lb gain    Iatrogenic Cushing's syndrome (H)  Seeing Endocrinology  - Cortisol    Drug-induced polyneuropathy (H)   Other chronic pain   she had been taking only 600 mg twice daily of the gabapentin, will increase back to the 600 mg four times daily - will do this slowly over the next 7-10 days    Encouraged to start walking and build up her stamina  We talked about joints in motion being less painful  We talked about general deconditioning making her shortness of breath more prominent    - [id number] - Urine Drug Confirmation Panel (Comprehensive); Future  - [id number] - Urine Drug Confirmation Panel (Comprehensive)      Diabetes mellitus, iatrogenic (H)  Stable, on metformin alone  Should improve with getting off prednisone  - Albumin Random Urine Quantitative with Creat Ratio; Future  - Hemoglobin A1c; Future  - Comprehensive metabolic panel; Future  - Lipid panel reflex to direct LDL Fasting; Future  - Albumin Random Urine Quantitative with Creat Ratio  - Hemoglobin A1c  - Comprehensive metabolic panel  - Lipid panel reflex to direct LDL Fasting    Ileostomy status (H)  Would like reversal of this, has to work on weight loss and get off prednisone    Metastatic malignant melanoma (H)  Follows with dermatology regularly    Immunosuppressed status (H)  On prednisone    Anxiety state  Increase to 150 mg twice daily   - venlafaxine (EFFEXOR) 75 MG tablet; Take  "2 tablets (150 mg) by mouth 2 times daily    Ulcerative colitis with complication, unspecified location (H)   s/p ileostomy currently    Prothrombin mutation (H)         Encounter for long-term (current) use of medications       Visit for screening mammogram     - MA SCREENING DIGITAL BILAT - Future  (s+30); Future             BMI:   Estimated body mass index is 44.3 kg/m  as calculated from the following:    Height as of 2/28/22: 1.6 m (5' 2.99\").    Weight as of this encounter: 113.4 kg (250 lb).           No follow-ups on file.    Anna Naidu MD  [facility]    Lance Coyne is a 47 year old who presents for the following health issues:    History of Present Illness       Reason for visit:  Lyphadema and pain    She eats 2-3 servings of fruits and vegetables daily.She consumes 0 sweetened beverage(s) daily.She exercises with enough effort to increase her heart rate 10 to 19 minutes per day.  She exercises with enough effort to increase her heart rate 3 or less days per week.   She is taking medications regularly.    Today's PHQ-9         PHQ-9 Total Score: 10    PHQ-9 Q9 Thoughts of better off dead/self-harm past 2 weeks :   Not at all    How difficult have these problems made it for you to do your work, take care of things at home, or get along with other people: Somewhat difficult       Has been weaning her prednisone, down to 5 mg.   Was supposed to see her rheumatologist for a 3 month follow up last week and that apt was cancelled as he was unavailable, now doesn't have follow up until August with him.  She is trying to get follow up at [facility] in a few weeks when she is there for her oncology visit and  PET scan.      Patient is working on completing her degree in Psychology through [employer] and I congratulated her on that.              Review of Systems   Constitutional, HEENT, cardiovascular, pulmonary, gi and gu systems are negative, except as otherwise " noted.      Objective    /80   Pulse 86   Temp 97.8  F (36.6  C) (Tympanic)   Resp 14   Wt 113.4 kg (250 lb)   LMP 05/10/2022 (Approximate)   SpO2 98%   Breastfeeding No   BMI 44.30 kg/m    Body mass index is 44.3 kg/m .  Physical Exam   GENERAL: healthy, alert and no distress  NECK: no adenopathy, no asymmetry, masses, or scars and thyroid normal to palpation  RESP: lungs clear to auscultation - no rales, rhonchi or wheezes  CV: regular rate and rhythm, normal S1 S2, no S3 or S4, no murmur, click or rub, no peripheral edema and peripheral pulses strong  ABDOMEN: soft, nontender, no hepatosplenomegaly, no masses and bowel sounds normal  MS: left arm lymphedema     Results for orders placed or performed in visit on 05/18/22   Hemoglobin A1c     Status: Abnormal   Result Value Ref Range    Hemoglobin A1C 6.9 (H) 0.0 - 5.6 %   [id number] - Urine Drug Confirmation Panel (Comprehensive)     Status: None (In process)    Narrative    The following orders were created for panel order [id number] - Urine Drug Confirmation Panel (Comprehensive).  Procedure                               Abnormality         Status                     ---------                               -----------         ------                     Urine Drug Confirmation ...[[id number]]                      In process                 Urine Creatinine for Rashel...[[id number]]                      In process                   Please view results for these tests on the individual orders.

## 2022-05-18 NOTE — TELEPHONE ENCOUNTER
Prior Authorization Retail Medication Request    Medication/Dose: Venlafaxine 75mg tabs  ICD code (if different than what is on RX):  na  Previously Tried and Failed:  na  Rationale:  na    Insurance Name:  gilda white  Insurance ID:  [id number]      Pharmacy Information (if different than what is on RX)  Name:  [facility] [place]  Phone:  [phone]

## 2022-05-19 NOTE — RESULT ENCOUNTER NOTE
Doretha,     The cortisol level of 7.3 does not completely exclude the possibility of adrenal insufficiency. I recommend to continue the prednisone 5 mg daily for now.  We will recheck this number again in 2 months to reassess.  When you check cortisol; it is recommended to be checked at 8 AM in the morning prior to morning dose of prednisone.  Please let me know if any questions.  Cary Castellanos MD

## 2022-05-22 LAB — GABAPENTIN UR QL CFM: PRESENT

## 2022-05-24 NOTE — TELEPHONE ENCOUNTER
Prior Authorization Approval- PATIENT'S INSURANCE HAS LAST NAME HARISH -(SEE INSURANCE CARD ON FILE)    Authorization Effective Date: 2/23/2022  Authorization Expiration Date: 5/24/2023  Medication: Venlafaxine 75mg tabs- [place] APPROVED  Approved Dose/Quantity: 120   Reference #:     Insurance Company:    Expected CoPay:       CoPay Card Available:      Foundation Assistance Needed:    Which Pharmacy is filling the prescription (Not needed for infusion/clinic administered): [facility] [place], [place] - [address]  Pharmacy Notified: Yes  Patient Notified: YesComment:  PHARMACY WILL NOTIFY WHEN READY

## 2022-05-24 NOTE — TELEPHONE ENCOUNTER
"PA Initiation- PATIENT INSURANCE HAS LAST NAME \"HARISH\"    Medication: Venlafaxine 75mg tabs  Insurance Company:    Pharmacy Filling the Rx: [place], [place] - [address]  Filling Pharmacy Phone: [phone]  Filling Pharmacy Fax:    Start Date: 5/24/2022    "

## 2022-05-26 RX ORDER — METHYLPREDNISOLONE SODIUM SUCCINATE 40 MG/ML
40 INJECTION, POWDER, LYOPHILIZED, FOR SOLUTION INTRAMUSCULAR; INTRAVENOUS ONCE
Status: CANCELLED
Start: 2022-05-26 | End: 2022-05-26

## 2022-05-31 ENCOUNTER — HOSPITAL ENCOUNTER (OUTPATIENT)
Dept: MAMMOGRAPHY | Facility: CLINIC | Age: 47
Discharge: HOME | End: 2022-05-31
Attending: FAMILY MEDICINE | Admitting: FAMILY MEDICINE
Payer: COMMERCIAL

## 2022-05-31 DIAGNOSIS — Z12.31 VISIT FOR SCREENING MAMMOGRAM: ICD-10-CM

## 2022-05-31 PROCEDURE — 77067 SCR MAMMO BI INCL CAD: CPT

## 2022-06-02 ENCOUNTER — TRANSFERRED RECORDS (OUTPATIENT)
Dept: HEALTH INFORMATION MANAGEMENT | Facility: CLINIC | Age: 47
End: 2022-06-02
Payer: COMMERCIAL

## 2022-06-02 LAB
ALT SERPL-CCNC: 31 U/L (ref 7–45)
AST SERPL-CCNC: 19 U/L (ref 8–43)
CREATININE (EXTERNAL): 0.89 MG/DL (ref 0.59–1.04)
GFR ESTIMATED (EXTERNAL): 78 ML/MIN/BSA
GFR ESTIMATED (IF AFRICAN AMERICAN) (EXTERNAL): 90 ML/MIN/BSA
GLUCOSE (EXTERNAL): 111 MG/DL (ref 70–140)
POTASSIUM (EXTERNAL): 4.8 MMOL/L (ref 3.6–5.2)
TSH SERPL-ACNC: 1.4 MLU/L (ref 0.3–4.2)

## 2022-06-22 NOTE — PROGRESS NOTES
Patient Quality Outreach    Patient is due for the following:   Physical  - DUE NOW    NEXT STEPS:   Schedule a yearly physical    Type of outreach:    Sent letter.      Questions for provider review:    None     CHARLETTE Murray LPN

## 2022-06-22 NOTE — LETTER
[facility]  [address]  [facility] [address]  [phone]       June 22, 2022    Doretha Yu  [address] [place] [address]        Dear Doretha,    We care about your health and have reviewed your health plan and are making recommendations based on this review, to optimize your health.    You are in particular need of attention regarding:  -Wellness (Physical) Visit     We are recommending that you:                                                                           -schedule a WELLNESS (Physical) APPOINTMENT. We will check fasting labs the same day. You should have nothing to eat except water and meds for 8-10 hours prior.                                                                                                 In addition, here is a list of due or overdue Health Maintenance reminders.    Health Maintenance Due   Topic Date Due     Diabetic Foot Exam  Never done     Pneumococcal Vaccine (1 - PCV) Never done     Hepatitis B Vaccine (1 of 3 - Risk 3-dose series) Never done     Preventive Care Visit  03/02/2016     Eye Exam  10/12/2018     Asthma Action Plan - yearly  01/28/2020     COVID-19 Vaccine (4 - Booster for Pfizer series) 11/30/2021     Diptheria Tetanus Pertussis (DTAP/TDAP/TD) Vaccine (5 - Td or Tdap) 04/09/2022     PAP  08/20/2022     HPV Follow Up  08/20/2022       To address the above recommendations, we encourage you to contact us at [phone], via [employer] or by contacting Central Scheduling toll free at [phone] 24 hours a day. They will assist you with finding the most convenient time and location.    Thank you for trusting [facility] and we appreciate the opportunity to serve you.  We look forward to supporting your healthcare needs in the future.    Healthy Regards,    Your [facility] Team

## 2022-06-24 ENCOUNTER — PATIENT OUTREACH (OUTPATIENT)
Dept: GASTROENTEROLOGY | Facility: CLINIC | Age: 47
End: 2022-06-24

## 2022-06-27 NOTE — TELEPHONE ENCOUNTER
Patient returns call   Patient concerned about her pet  scan at [facility]  Requesting Dr Boudreaux read report and advise  States that provider asked her if she had abdominal pain and she said she is not sure   Reports more like a dull ache at night   Duration 2 months s more on left than right   Last seen in GI in June of 2021  Flex sig in April 2021   Route to Dr Boudreaux

## 2022-07-29 ENCOUNTER — TELEPHONE (OUTPATIENT)
Dept: FAMILY MEDICINE | Facility: CLINIC | Age: 47
End: 2022-07-29

## 2022-07-29 ENCOUNTER — PATIENT OUTREACH (OUTPATIENT)
Dept: FAMILY MEDICINE | Facility: CLINIC | Age: 47
End: 2022-07-29

## 2022-07-29 DIAGNOSIS — R87.810 CERVICAL HIGH RISK HPV (HUMAN PAPILLOMAVIRUS) TEST POSITIVE: ICD-10-CM

## 2022-07-29 NOTE — TELEPHONE ENCOUNTER
Patient called asking for an appointment with Dr Naidu as her depression and anxiety are not managed with current medication.  Patient states she has been crying frequently for 2 weeks and feels her symptoms are worsening.    Patient states she has support and is keeping friend/family aware of how she is doing.  Denies thoughts or plan for self harm.  Patient states she has many health problems and is feeling overwhelmed.  Patient states Dr Naidu knows all about this and she would like to talk with her.  First available is same day 8/1/22, please advise if okay to use this appointment.  Patient states she is open corrie virtual or in person visit.  Will route to the care team to advise.  Cristy Ritter RN

## 2022-08-14 ENCOUNTER — APPOINTMENT (OUTPATIENT)
Dept: CT IMAGING | Facility: CLINIC | Age: 47
End: 2022-08-14
Attending: EMERGENCY MEDICINE
Payer: COMMERCIAL

## 2022-08-14 ENCOUNTER — HOSPITAL ENCOUNTER (EMERGENCY)
Facility: CLINIC | Age: 47
Discharge: HOME | End: 2022-08-14
Attending: EMERGENCY MEDICINE | Admitting: EMERGENCY MEDICINE
Payer: COMMERCIAL

## 2022-08-14 VITALS
TEMPERATURE: 97.9 F | SYSTOLIC BLOOD PRESSURE: 118 MMHG | HEART RATE: 80 BPM | OXYGEN SATURATION: 97 % | RESPIRATION RATE: 16 BRPM | DIASTOLIC BLOOD PRESSURE: 79 MMHG

## 2022-08-14 DIAGNOSIS — R10.11 ABDOMINAL PAIN, RIGHT UPPER QUADRANT: ICD-10-CM

## 2022-08-14 DIAGNOSIS — Z20.822 COVID-19 RULED OUT BY LABORATORY TESTING: ICD-10-CM

## 2022-08-14 DIAGNOSIS — K43.5 PARASTOMAL HERNIA WITHOUT OBSTRUCTION OR GANGRENE: Primary | ICD-10-CM

## 2022-08-14 LAB
ALBUMIN SERPL BCG-MCNC: 4.6 G/DL (ref 3.5–5.2)
ALBUMIN UR-MCNC: NEGATIVE MG/DL
ALP SERPL-CCNC: 77 U/L (ref 35–104)
ALT SERPL W P-5'-P-CCNC: 40 U/L (ref 10–35)
ANION GAP SERPL CALCULATED.3IONS-SCNC: 15 MMOL/L (ref 7–15)
APPEARANCE UR: CLEAR
AST SERPL W P-5'-P-CCNC: 25 U/L (ref 10–35)
BASOPHILS # BLD AUTO: 0.1 10E3/UL (ref 0–0.2)
BASOPHILS NFR BLD AUTO: 1 %
BILIRUB SERPL-MCNC: 0.2 MG/DL
BILIRUB UR QL STRIP: NEGATIVE
BUN SERPL-MCNC: 13.1 MG/DL (ref 6–20)
CALCIUM SERPL-MCNC: 10.2 MG/DL (ref 8.6–10)
CHLORIDE SERPL-SCNC: 102 MMOL/L (ref 98–107)
COLOR UR AUTO: NORMAL
CREAT SERPL-MCNC: 0.67 MG/DL (ref 0.51–0.95)
DEPRECATED HCO3 PLAS-SCNC: 21 MMOL/L (ref 22–29)
EOSINOPHIL # BLD AUTO: 0.2 10E3/UL (ref 0–0.7)
EOSINOPHIL NFR BLD AUTO: 2 %
ERYTHROCYTE [DISTWIDTH] IN BLOOD BY AUTOMATED COUNT: 15.1 % (ref 10–15)
GFR SERPL CREATININE-BSD FRML MDRD: >90 ML/MIN/1.73M2
GLUCOSE SERPL-MCNC: 111 MG/DL (ref 70–99)
GLUCOSE UR STRIP-MCNC: NEGATIVE MG/DL
HCG SERPL QL: NEGATIVE
HCT VFR BLD AUTO: 38.7 % (ref 35–47)
HGB BLD-MCNC: 12.5 G/DL (ref 11.7–15.7)
HGB UR QL STRIP: NEGATIVE
HOLD SPECIMEN: NORMAL
IMM GRANULOCYTES # BLD: 0.1 10E3/UL
IMM GRANULOCYTES NFR BLD: 1 %
KETONES UR STRIP-MCNC: NEGATIVE MG/DL
LACTATE SERPL-SCNC: 1.3 MMOL/L (ref 0.7–2)
LACTATE SERPL-SCNC: 2.3 MMOL/L (ref 0.7–2)
LACTATE SERPL-SCNC: 2.6 MMOL/L (ref 0.7–2)
LEUKOCYTE ESTERASE UR QL STRIP: NEGATIVE
LIPASE SERPL-CCNC: 23 U/L (ref 13–60)
LYMPHOCYTES # BLD AUTO: 1.9 10E3/UL (ref 0.8–5.3)
LYMPHOCYTES NFR BLD AUTO: 16 %
MCH RBC QN AUTO: 28.8 PG (ref 26.5–33)
MCHC RBC AUTO-ENTMCNC: 32.3 G/DL (ref 31.5–36.5)
MCV RBC AUTO: 89 FL (ref 78–100)
MONOCYTES # BLD AUTO: 0.7 10E3/UL (ref 0–1.3)
MONOCYTES NFR BLD AUTO: 6 %
NEUTROPHILS # BLD AUTO: 8.5 10E3/UL (ref 1.6–8.3)
NEUTROPHILS NFR BLD AUTO: 74 %
NITRATE UR QL: NEGATIVE
NRBC # BLD AUTO: 0 10E3/UL
NRBC BLD AUTO-RTO: 0 /100
PH UR STRIP: 5 [PH] (ref 5–7)
PLATELET # BLD AUTO: 350 10E3/UL (ref 150–450)
POTASSIUM SERPL-SCNC: 4.1 MMOL/L (ref 3.4–5.3)
PROT SERPL-MCNC: 7.4 G/DL (ref 6.4–8.3)
RBC # BLD AUTO: 4.34 10E6/UL (ref 3.8–5.2)
RBC URINE: <1 /HPF
SARS-COV-2 RNA RESP QL NAA+PROBE: NEGATIVE
SODIUM SERPL-SCNC: 138 MMOL/L (ref 136–145)
SP GR UR STRIP: 1.01 (ref 1–1.03)
SQUAMOUS EPITHELIAL: <1 /HPF
UROBILINOGEN UR STRIP-MCNC: NORMAL MG/DL
WBC # BLD AUTO: 11.4 10E3/UL (ref 4–11)
WBC URINE: 0 /HPF

## 2022-08-14 PROCEDURE — 80053 COMPREHEN METABOLIC PANEL: CPT | Performed by: EMERGENCY MEDICINE

## 2022-08-14 PROCEDURE — 81001 URINALYSIS AUTO W/SCOPE: CPT | Performed by: EMERGENCY MEDICINE

## 2022-08-14 PROCEDURE — 36415 COLL VENOUS BLD VENIPUNCTURE: CPT | Performed by: EMERGENCY MEDICINE

## 2022-08-14 PROCEDURE — 74177 CT ABD & PELVIS W/CONTRAST: CPT

## 2022-08-14 PROCEDURE — U0005 INFEC AGEN DETEC AMPLI PROBE: HCPCS | Performed by: EMERGENCY MEDICINE

## 2022-08-14 PROCEDURE — 96374 THER/PROPH/DIAG INJ IV PUSH: CPT | Performed by: EMERGENCY MEDICINE

## 2022-08-14 PROCEDURE — 96375 TX/PRO/DX INJ NEW DRUG ADDON: CPT | Performed by: EMERGENCY MEDICINE

## 2022-08-14 PROCEDURE — 74177 CT ABD & PELVIS W/CONTRAST: CPT | Mod: 26 | Performed by: RADIOLOGY

## 2022-08-14 PROCEDURE — 258N000003 HC RX IP 258 OP 636: Performed by: EMERGENCY MEDICINE

## 2022-08-14 PROCEDURE — 83605 ASSAY OF LACTIC ACID: CPT | Performed by: EMERGENCY MEDICINE

## 2022-08-14 PROCEDURE — 83690 ASSAY OF LIPASE: CPT | Performed by: EMERGENCY MEDICINE

## 2022-08-14 PROCEDURE — 250N000011 HC RX IP 250 OP 636: Performed by: EMERGENCY MEDICINE

## 2022-08-14 PROCEDURE — 999N000127 HC STATISTIC PERIPHERAL IV START W US GUIDANCE

## 2022-08-14 PROCEDURE — 82040 ASSAY OF SERUM ALBUMIN: CPT | Performed by: EMERGENCY MEDICINE

## 2022-08-14 PROCEDURE — 99285 EMERGENCY DEPT VISIT HI MDM: CPT | Performed by: EMERGENCY MEDICINE

## 2022-08-14 PROCEDURE — 96376 TX/PRO/DX INJ SAME DRUG ADON: CPT | Performed by: EMERGENCY MEDICINE

## 2022-08-14 PROCEDURE — 96361 HYDRATE IV INFUSION ADD-ON: CPT | Performed by: EMERGENCY MEDICINE

## 2022-08-14 PROCEDURE — 85025 COMPLETE CBC W/AUTO DIFF WBC: CPT | Performed by: EMERGENCY MEDICINE

## 2022-08-14 PROCEDURE — C9803 HOPD COVID-19 SPEC COLLECT: HCPCS | Performed by: EMERGENCY MEDICINE

## 2022-08-14 PROCEDURE — 84703 CHORIONIC GONADOTROPIN ASSAY: CPT | Performed by: EMERGENCY MEDICINE

## 2022-08-14 PROCEDURE — 87040 BLOOD CULTURE FOR BACTERIA: CPT | Performed by: EMERGENCY MEDICINE

## 2022-08-14 PROCEDURE — 99285 EMERGENCY DEPT VISIT HI MDM: CPT | Mod: 25 | Performed by: EMERGENCY MEDICINE

## 2022-08-14 PROCEDURE — 250N000013 HC RX MED GY IP 250 OP 250 PS 637: Performed by: EMERGENCY MEDICINE

## 2022-08-14 RX ORDER — HYDROMORPHONE HYDROCHLORIDE 1 MG/ML
0.5 INJECTION, SOLUTION INTRAMUSCULAR; INTRAVENOUS; SUBCUTANEOUS
Status: COMPLETED | OUTPATIENT
Start: 2022-08-14 | End: 2022-08-14

## 2022-08-14 RX ORDER — IOPAMIDOL 755 MG/ML
135 INJECTION, SOLUTION INTRAVASCULAR ONCE
Status: COMPLETED | OUTPATIENT
Start: 2022-08-14 | End: 2022-08-14

## 2022-08-14 RX ORDER — ACETAMINOPHEN 325 MG/1
975 TABLET ORAL ONCE
Status: COMPLETED | OUTPATIENT
Start: 2022-08-14 | End: 2022-08-14

## 2022-08-14 RX ORDER — SODIUM CHLORIDE 9 MG/ML
INJECTION, SOLUTION INTRAVENOUS CONTINUOUS
Status: DISCONTINUED | OUTPATIENT
Start: 2022-08-14 | End: 2022-08-15 | Stop reason: HOSPADM

## 2022-08-14 RX ORDER — ONDANSETRON 2 MG/ML
4 INJECTION INTRAMUSCULAR; INTRAVENOUS EVERY 30 MIN PRN
Status: DISCONTINUED | OUTPATIENT
Start: 2022-08-14 | End: 2022-08-15 | Stop reason: HOSPADM

## 2022-08-14 RX ORDER — KETOROLAC TROMETHAMINE 30 MG/ML
30 INJECTION, SOLUTION INTRAMUSCULAR; INTRAVENOUS ONCE
Status: COMPLETED | OUTPATIENT
Start: 2022-08-14 | End: 2022-08-14

## 2022-08-14 RX ORDER — LIDOCAINE 4 G/G
1 PATCH TOPICAL EVERY 24 HOURS
Qty: 14 PATCH | Refills: 0 | Status: SHIPPED | OUTPATIENT
Start: 2022-08-14 | End: 2022-12-07

## 2022-08-14 RX ORDER — METHOCARBAMOL 500 MG/1
1000 TABLET, FILM COATED ORAL 3 TIMES DAILY PRN
Qty: 30 TABLET | Refills: 1 | Status: SHIPPED | OUTPATIENT
Start: 2022-08-14 | End: 2022-12-07

## 2022-08-14 RX ORDER — ONDANSETRON 4 MG/1
4 TABLET, ORALLY DISINTEGRATING ORAL EVERY 8 HOURS PRN
Qty: 20 TABLET | Refills: 1 | Status: SHIPPED | OUTPATIENT
Start: 2022-08-14 | End: 2022-08-17

## 2022-08-14 RX ADMIN — HYDROMORPHONE HYDROCHLORIDE 0.5 MG: 1 INJECTION, SOLUTION INTRAMUSCULAR; INTRAVENOUS; SUBCUTANEOUS at 17:25

## 2022-08-14 RX ADMIN — SODIUM CHLORIDE: 9 INJECTION, SOLUTION INTRAVENOUS at 17:39

## 2022-08-14 RX ADMIN — KETOROLAC TROMETHAMINE 30 MG: 30 INJECTION, SOLUTION INTRAMUSCULAR at 20:53

## 2022-08-14 RX ADMIN — SODIUM CHLORIDE: 900 INJECTION, SOLUTION INTRAVENOUS at 18:30

## 2022-08-14 RX ADMIN — HYDROMORPHONE HYDROCHLORIDE 0.5 MG: 1 INJECTION, SOLUTION INTRAMUSCULAR; INTRAVENOUS; SUBCUTANEOUS at 15:21

## 2022-08-14 RX ADMIN — HYDROMORPHONE HYDROCHLORIDE 1 MG: 1 INJECTION, SOLUTION INTRAMUSCULAR; INTRAVENOUS; SUBCUTANEOUS at 18:22

## 2022-08-14 RX ADMIN — IOPAMIDOL 135 ML: 755 INJECTION, SOLUTION INTRAVENOUS at 15:32

## 2022-08-14 RX ADMIN — SODIUM CHLORIDE 1000 ML: 9 INJECTION, SOLUTION INTRAVENOUS at 19:30

## 2022-08-14 RX ADMIN — ACETAMINOPHEN 975 MG: 325 TABLET, FILM COATED ORAL at 20:53

## 2022-08-14 RX ADMIN — SODIUM CHLORIDE 1000 ML: 9 INJECTION, SOLUTION INTRAVENOUS at 15:44

## 2022-08-14 RX ADMIN — SODIUM CHLORIDE 1000 ML: 9 INJECTION, SOLUTION INTRAVENOUS at 18:22

## 2022-08-14 ASSESSMENT — ACTIVITIES OF DAILY LIVING (ADL)
ADLS_ACUITY_SCORE: 39
ADLS_ACUITY_SCORE: 37

## 2022-08-14 ASSESSMENT — ENCOUNTER SYMPTOMS
ABDOMINAL DISTENTION: 0
CONSTIPATION: 0
COLOR CHANGE: 0
DYSURIA: 0
ARTHRALGIAS: 0
VOMITING: 0
DIFFICULTY URINATING: 0
BACK PAIN: 0
DIARRHEA: 0
FATIGUE: 0
SORE THROAT: 0
HEADACHES: 1
NECK PAIN: 0
DIZZINESS: 0
FEVER: 0
COUGH: 0
PALPITATIONS: 0
CONFUSION: 0
DIAPHORESIS: 1
ABDOMINAL PAIN: 1
SHORTNESS OF BREATH: 0
CHEST TIGHTNESS: 0
NAUSEA: 0
FREQUENCY: 1
MYALGIAS: 0
CHILLS: 0
EYE PAIN: 0
WEAKNESS: 0

## 2022-08-14 NOTE — ED TRIAGE NOTES
C/o severe abdominal pain under R rib cage/abdomen, Nausea/vomiting 3 days, migraines  Has Ostomy, noticed extra air, thought she had blockage

## 2022-08-14 NOTE — ED PROVIDER NOTES
ED Provider Note  [facility]      History     Chief Complaint   Patient presents with     Abdominal Pain     Vomiting     The history is provided by the patient and medical records.     Doretha Yu is a 47 year old female with PMH significant for metastatic malignant melanoma of unknown primary (resected), currently in complete remission,  adjuvant Immunotherapy induced severe colitis, and arthritis requiring active therapy, currently struggling again with hemorrhagic colitis s/p colectomy w/ ostomy in place (since 6/3/21), chronic pain syndrome centered on disabling joint pain (currently on prednisone at 5 mg/day), and steroid induced DMII presenting to the ED with abdominal pain and vomiting. Patient reports that she had multiple episodes of emesis on Thursday night (8/11) and has felt generally unwell since. She initially was concerned she may have an obstruction, but her ostomy was functioning normally. This morning she woke up sweating with a sharp, stabbing pain in the RUQ just superior to her stoma. She has continued to have intermittent waves of sharp pain in the RUQ as well as tenderness diffusely throughout the abdomen. This feels similar to when she had her appendix out 18 years ago. She still has her gallbladder. She notes that she has a known hernia surrounding her stoma. She has not eaten much today. Ostomy output has been normal, but less with increased air. She endorses urinary frequency with decreased urine. She has also been having migraines in the past few days. She is not currently on chemo, has been cancer free for 5 years since her resection and initial treatment.     CT Chest PE Abdomen Pelvis W/ contrast 1/7/22  IMPRESSION:  1.  Negative for pulmonary embolism.     2.  Patchy groundglass infiltrates in the lungs bilaterally most suggestive of COVID-19 pneumonitis.     3.  Postoperative changes of subtotal colectomy with Mejía pouch and right upper quadrant  ileostomy. There is some diffuse low-density wall thickening in the wall of the Mejía pouch which could indicate a nonspecific proctocolitis. Clinical   correlation.     4.  Bowel elsewhere is unremarkable. No evidence for obstruction.     5.  Marked pancreatic atrophy, unchanged.    Past Medical History  Past Medical History:   Diagnosis Date     Abnormal MRI     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      Anxiety      Basal cell carcinoma      Cervical high risk HPV (human papillomavirus) test positive 2019    See problem list     Colitis      Depression      Diabetes mellitus, iatrogenic (H) 2020     Esophageal reflux      Inflammatory arthritis      Insomnia      Intestinal giardiasis 2018     Lumbago     left lower back pain     Lymphedema      Malignant melanoma (H)      Melanoma (H) 10/23/2017     Migraines      Mild persistent asthma      Morbid obesity with BMI of 40.0-44.9, adult (H)      STORMY (obstructive sleep apnea)      Prothrombin deficiency (H)     takes 81mg asa daily     Stroke (cerebrum) (H)     During      TIA (transient ischemic attack)      Type 2 diabetes mellitus (H)      Past Surgical History:   Procedure Laterality Date     APPENDECTOMY       COLONOSCOPY N/A 10/18/2017    Procedure: COLONOSCOPY;  Colon;  Surgeon: Debbie Stephens MD;  [facility]     COLONOSCOPY N/A 2018    Procedure: COMBINED COLONOSCOPY, SINGLE OR MULTIPLE BIOPSY/POLYPECTOMY BY BIOPSY;  colon;  Surgeon: Benita Schumacher MD;  Location: [facility] GI     COLONOSCOPY      multiple since  to present - about 6 total     DISSECT LYMPH NODE AXILLA Left 10/23/2017    Procedure: DISSECT LYMPH NODE AXILLA;  Left Axillary Lymph Node Dissection ;  Surgeon: Laurent Cool MD;  Location: [facility] OR     EXAM UNDER ANESTHESIA PELVIC N/A 2020    Procedure: EXAM UNDER ANESTHESIA, PELVIS; with Cervical Biopsies, Vaginal Biopsy and Endocervical Curettings;  Surgeon: Erich  MD Melina;  Location: [facility] OR     GYN SURGERY  ,          LAPAROSCOPIC ASSISTED COLECTOMY N/A 06/15/2021    Procedure: laparoscopic total abdominal colectomy, end ileostomy;  Surgeon: Quinton Ram MD;  Location: [facility] OR     REPAIR MOHS Left 2017    Procedure: REPAIR MOHS;  Left Upper Lid Moh's Reconstruction;  Surgeon: Kisha Bosch MD;  Location: [facility]     Lidocaine (LIDOCARE) 4 % Patch  methocarbamol (ROBAXIN) 500 MG tablet  acetaminophen (TYLENOL) 325 MG tablet  Alcohol Swabs (ALCOHOL PREP) 70 % PADS  blood glucose (NO BRAND SPECIFIED) lancets standard  blood glucose (NO BRAND SPECIFIED) test strip  Calcium Carb-Cholecalciferol 600-800 MG-UNIT TABS  calcium carbonate (TUMS) 500 MG chewable tablet  cyanocobalamin (VITAMIN B-12) 1000 MCG tablet  gabapentin (NEURONTIN) 600 MG tablet  hydrOXYzine (ATARAX) 25 MG tablet  loperamide (IMODIUM A-D) 2 MG tablet  loperamide (IMODIUM A-D) 2 MG tablet  metFORMIN (GLUCOPHAGE) 500 MG tablet  Ostomy Supplies MISC  pantoprazole (PROTONIX) 40 MG EC tablet  predniSONE (DELTASONE) 1 MG tablet  simethicone (MYLICON) 80 MG chewable tablet  venlafaxine (EFFEXOR) 75 MG tablet  VITAMIN D3 25 MCG (1000 UT) tablet  VITRON-C  MG TABS tablet      Allergies   Allergen Reactions     Bee Venom Swelling     Azithromycin Diarrhea     Erythromycin      Other reaction(s): GI intolerance, Vomiting     Fentanyl Other (See Comments)     sweating  sweating     Prochlorperazine Fatigue     Other reaction(s): Other (see comments)  Fatigue     Buspirone      Other reaction(s): GI intolerance  vomiting     Erythrocin Nausea and Vomiting     Zithromax [Azithromycin Dihydrate] Diarrhea     Enbrel [Etanercept] Hives and Rash     Family History  Family History   Problem Relation Age of Onset     Cancer Mother 45        lung     Neurologic Disorder Mother         epilepsy     Lipids Father      Gastrointestinal Disease Father         diverticulitis      Depression  Father      Colitis Father      Colon Cancer Father      Diverticulitis Father      Cancer Maternal Grandmother      Blood Disease Maternal Grandmother         lymphoma      Arthritis Maternal Grandmother      Diabetes Maternal Grandmother      Depression Maternal Grandmother      Macular Degeneration Maternal Grandmother      Glaucoma Maternal Grandmother      Diabetes Maternal Grandfather      Cerebrovascular Disease Maternal Grandfather      Blood Disease Maternal Grandfather      Heart Disease Maternal Grandfather      Glaucoma Maternal Grandfather      Cancer Paternal Grandmother      Cancer - colorectal Paternal Grandmother      Colitis Paternal Grandmother      Colon Cancer Paternal Grandmother      Diverticulitis Paternal Grandmother      Respiratory Paternal Grandfather         emphysema      Colitis Paternal Grandfather      Colon Cancer Paternal Grandfather      Diverticulitis Paternal Grandfather      Heart Disease Daughter      Asthma Daughter      Depression Sister      Melanoma No family hx of      Social History   Social History     Tobacco Use     Smoking status: Former Smoker     Packs/day: 1.00     Years: 5.00     Pack years: 5.00     Quit date: 3/20/1998     Years since quittin.4     Smokeless tobacco: Never Used   Vaping Use     Vaping Use: Never used   Substance Use Topics     Alcohol use: Not Currently     Drug use: No      Past medical history, past surgical history, medications, allergies, family history, and social history were reviewed with the patient. No additional pertinent items.       Review of Systems   Constitutional: Positive for diaphoresis. Negative for chills, fatigue and fever.   HENT: Negative for congestion and sore throat.    Eyes: Negative for pain and visual disturbance.   Respiratory: Negative for cough, chest tightness and shortness of breath.    Cardiovascular: Negative for chest pain and palpitations.   Gastrointestinal: Positive for abdominal pain (RUQ). Negative  for abdominal distention, constipation, diarrhea, nausea and vomiting.        Increased gas in ostomy     Genitourinary: Positive for decreased urine volume and frequency. Negative for difficulty urinating, dysuria and urgency.   Musculoskeletal: Negative for arthralgias, back pain, myalgias and neck pain.   Skin: Negative for color change and rash.   Neurological: Positive for headaches. Negative for dizziness and weakness.   Psychiatric/Behavioral: Negative for confusion.     A complete review of systems was performed with pertinent positives and negatives noted in the HPI, and all other systems negative.    Physical Exam   BP: 118/86  Pulse: 94  Temp: 98.1  F (36.7  C)  Resp: 16  SpO2: 99 %  Physical Exam  Vitals and nursing note reviewed.   Constitutional:       General: She is not in acute distress.     Appearance: Normal appearance. She is not ill-appearing or toxic-appearing.   HENT:      Head: Normocephalic and atraumatic.      Nose: Nose normal.      Mouth/Throat:      Mouth: Mucous membranes are moist.   Eyes:      Pupils: Pupils are equal, round, and reactive to light.   Cardiovascular:      Rate and Rhythm: Normal rate.      Pulses: Normal pulses.      Heart sounds: Normal heart sounds.   Pulmonary:      Effort: Pulmonary effort is normal. No respiratory distress.      Breath sounds: Normal breath sounds.   Abdominal:      General: Abdomen is flat. There is no distension.      Comments: Tenderness palpation in the right upper quadrant.  Positive Mandel sign.  Tenderness around the stoma.  Brown stool output.  Guarding present.  No distention.   Musculoskeletal:         General: No swelling or deformity. Normal range of motion.      Cervical back: Normal range of motion. No rigidity.   Skin:     General: Skin is warm.      Capillary Refill: Capillary refill takes less than 2 seconds.   Neurological:      Mental Status: She is alert and oriented to person, place, and time.   Psychiatric:         Mood and  Affect: Mood normal.         ED Course     ED Course as of 08/23/22 2232   Sun Aug 14, 2022   1500 Patient with history of metastatic melanoma, currently in remission, also history of ulcerative colitis status post colectomy with medical quadrant ostomy, presents to the ED for evaluation of right upper quadrant abdominal pain   1500 Differential diagnosis cholecystitis, hepatitis, SBO, parastomal hernia   1500 , pancreatitis, UTI, pyelonephritis, ureterolithiasis   1500 BP: 118/86   1500 Temp: 98.1  F (36.7  C)   1500 Pulse: 94   1500 Resp: 16   1500 SpO2: 99 %  On arrival, patient has normal vital signs.  She overall appears uncomfortable due to the pain.  Has significant tenderness in the right upper quadrant with a positive Mandel sign.  No signs of acute peritonitis   1501 Plan for CBC, CMP, lipase, urinalysis, CT abdomen pelvis IV contrast.            Results for orders placed or performed during the hospital encounter of 08/14/22   CT Abdomen Pelvis w Contrast     Status: None    Narrative    EXAMINATION: TEMPORARY, 8/14/2022 3:36 PM    TECHNIQUE:  Helical CT images from the lung bases through the  symphysis pubis were obtained with IV contrast.     Contrast dose: 135 cc Isovue-370    COMPARISON: CT chest, abdomen, and pelvis 1/7/2022    HISTORY: Right upper quadrant pain with positive Mandel sign. History  of ulcerative colitis with right upper quadrant ileostomy.    FINDINGS:    Abdomen and pelvis: Wedge-shaped area of hypoattenuation in in the  right hepatic dome (series 2, image 20) The remainder of the liver is  within normal limits. The gallbladder, biliary system, and spleen are  within normal limits. Fatty replacement of the pancreatic parenchyma.  The adrenal glands are unremarkable. Symmetric enhancement of the  kidneys. No hydronephrosis. The urinary bladder is within normal  limits. Reproductive organs are within normal limits.    Postsurgical changes of subtotal colectomy with Mejía's pouch  and  right upper quadrant ileostomy. Parastomal herniation of fat. No  abnormally dilated loops of small bowel. No significant free fluid in  the abdomen or pelvis. No free air, portal venous gas, or pneumatosis.  The portal venous system is patent. Normal caliber abdominal aorta. No  enlarged abdominal or pelvic lymph nodes.    Lung bases: The cardiac size is normal. No pericardial effusion. The  lung bases are clear.    Bones and soft tissues: The soft tissues are within normal limits. No  acute or suspicious osseous lesions.      Impression    IMPRESSION:   1. No acute findings in the abdomen or pelvis to explain the patient's  symptoms. Specifically, no evidence of cholecystitis.  2. Wedge-shaped area of hypoattenuation in the right hepatic dome, may  represent transient hepatic attenuation difference/focal fatty  infiltration/focal fibrosis.   3. Stable postsurgical changes of subtotal colectomy with Mejía's  pouch and right upper quadrant ileostomy with parastomal herniation of  fat.    I have personally reviewed the examination and initial interpretation  and I agree with the findings.    JERMAINE TORRES MD         SYSTEM ID:  [id number]   Comprehensive metabolic panel     Status: Abnormal   Result Value Ref Range    Sodium 138 136 - 145 mmol/L    Potassium 4.1 3.4 - 5.3 mmol/L    Creatinine 0.67 0.51 - 0.95 mg/dL    Urea Nitrogen 13.1 6.0 - 20.0 mg/dL    Chloride 102 98 - 107 mmol/L    Carbon Dioxide (CO2) 21 (L) 22 - 29 mmol/L    Anion Gap 15 7 - 15 mmol/L    Glucose 111 (H) 70 - 99 mg/dL    Calcium 10.2 (H) 8.6 - 10.0 mg/dL    Protein Total 7.4 6.4 - 8.3 g/dL    Albumin 4.6 3.5 - 5.2 g/dL    Bilirubin Total 0.2 <=1.2 mg/dL    Alkaline Phosphatase 77 35 - 104 U/L    AST 25 10 - 35 U/L    ALT 40 (H) 10 - 35 U/L    GFR Estimate >90 >60 mL/min/1.73m2   UA with Microscopic reflex to Culture     Status: Normal    Specimen: Urine, Midstream   Result Value Ref Range    Color Urine Straw Colorless, Straw, Light  Yellow, Yellow    Appearance Urine Clear Clear    Glucose Urine Negative Negative mg/dL    Bilirubin Urine Negative Negative    Ketones Urine Negative Negative mg/dL    Specific Gravity Urine 1.010 1.003 - 1.035    Blood Urine Negative Negative    pH Urine 5.0 5.0 - 7.0    Protein Albumin Urine Negative Negative mg/dL    Urobilinogen Urine Normal Normal, 2.0 mg/dL    Nitrite Urine Negative Negative    Leukocyte Esterase Urine Negative Negative    RBC Urine <1 <=2 /HPF    WBC Urine 0 <=5 /HPF    Squamous Epithelials Urine <1 <=1 /HPF    Narrative    Urine Culture not indicated   Lipase     Status: Normal   Result Value Ref Range    Lipase 23 13 - 60 U/L   [place] Draw     Status: None    Narrative    The following orders were created for panel order [place] Draw.  Procedure                               Abnormality         Status                     ---------                               -----------         ------                     Extra Blue Top Tube[[id number]]                              Final result               Extra Red Top Tube[[id number]]                               Final result                 Please view results for these tests on the individual orders.   CBC with platelets and differential     Status: Abnormal   Result Value Ref Range    WBC Count 11.4 (H) 4.0 - 11.0 10e3/uL    RBC Count 4.34 3.80 - 5.20 10e6/uL    Hemoglobin 12.5 11.7 - 15.7 g/dL    Hematocrit 38.7 35.0 - 47.0 %    MCV 89 78 - 100 fL    MCH 28.8 26.5 - 33.0 pg    MCHC 32.3 31.5 - 36.5 g/dL    RDW 15.1 (H) 10.0 - 15.0 %    Platelet Count 350 150 - 450 10e3/uL    % Neutrophils 74 %    % Lymphocytes 16 %    % Monocytes 6 %    % Eosinophils 2 %    % Basophils 1 %    % Immature Granulocytes 1 %    NRBCs per 100 WBC 0 <1 /100    Absolute Neutrophils 8.5 (H) 1.6 - 8.3 10e3/uL    Absolute Lymphocytes 1.9 0.8 - 5.3 10e3/uL    Absolute Monocytes 0.7 0.0 - 1.3 10e3/uL    Absolute Eosinophils 0.2 0.0 - 0.7 10e3/uL    Absolute Basophils 0.1 0.0 -  0.2 10e3/uL    Absolute Immature Granulocytes 0.1 <=0.4 10e3/uL    Absolute NRBCs 0.0 10e3/uL   Extra Blue Top Tube     Status: None   Result Value Ref Range    Hold Specimen JIC    Extra Red Top Tube     Status: None   Result Value Ref Range    Hold Specimen JIC    Lactic acid whole blood     Status: Abnormal   Result Value Ref Range    Lactic Acid 2.6 (H) 0.7 - 2.0 mmol/L   HCG qualitative Blood     Status: Normal   Result Value Ref Range    hCG Serum Qualitative Negative Negative   Lactic acid whole blood     Status: Abnormal   Result Value Ref Range    Lactic Acid 2.3 (H) 0.7 - 2.0 mmol/L   Asymptomatic COVID-19 Virus (Coronavirus) by PCR Nasopharyngeal     Status: Normal    Specimen: Nasopharyngeal; Swab   Result Value Ref Range    SARS CoV2 PCR Negative Negative    Narrative    Testing was performed using the Xpert Xpress SARS-CoV-2 Assay on the  Cepheid Gene-Xpert Instrument Systems. Additional information about  this Emergency Use Authorization (EUA) assay can be found via the Lab  Guide. This test should be ordered for the detection of SARS-CoV-2 in  individuals who meet SARS-CoV-2 clinical and/or epidemiological  criteria. Test performance is unknown in asymptomatic patients. This  test is for in vitro diagnostic use under the FDA EUA for  laboratories certified under CLIA to perform high complexity testing.  This test has not been FDA cleared or approved. A negative result  does not rule out the presence of PCR inhibitors in the specimen or  target RNA in concentration below the limit of detection for the  assay. The possibility of a false negative should be considered if  the patient's recent exposure or clinical presentation suggests  COVID-19. This test was validated by the [facility]. This laboratory is certified under  the Clinical Laboratory Improvement Amendments of 1988 (CLIA-88) as  qualified to perform high complexity laboratory testing.      Lactic acid whole blood     Status: Normal   Result Value Ref Range    Lactic Acid 1.3 0.7 - 2.0 mmol/L   Extra Tube     Status: None    Narrative    The following orders were created for panel order Extra Tube.  Procedure                               Abnormality         Status                     ---------                               -----------         ------                     Extra Blue Top Tube[[id number]]                              Final result               Extra Red Top Tube[[id number]]                               Final result               Extra Purple Top Tube[[id number]]                            Final result                 Please view results for these tests on the individual orders.   Extra Blue Top Tube     Status: None   Result Value Ref Range    Hold Specimen JIC    Extra Red Top Tube     Status: None   Result Value Ref Range    Hold Specimen JIC    Extra Purple Top Tube     Status: None   Result Value Ref Range    Hold Specimen JIC    Blood Culture Hand, Right     Status: Normal    Specimen: Hand, Right; Blood   Result Value Ref Range    Culture No Growth    CBC with platelets differential     Status: Abnormal    Narrative    The following orders were created for panel order CBC with platelets differential.  Procedure                               Abnormality         Status                     ---------                               -----------         ------                     CBC with platelets and d...[[id number]]  Abnormal            Final result                 Please view results for these tests on the individual orders.     Medications   HYDROmorphone (PF) (DILAUDID) injection 0.5 mg (0.5 mg Intravenous Given 8/14/22 1521)   iopamidol (ISOVUE-370) solution 135 mL (135 mLs Intravenous Given 8/14/22 1532)   sodium chloride (PF) 0.9% PF flush 84 mL (84 mLs Intravenous Given 8/14/22 1532)   0.9% sodium chloride BOLUS (0 mLs Intravenous Stopped 8/14/22 1739)   HYDROmorphone (PF)  (DILAUDID) injection 0.5 mg (0.5 mg Intravenous Given 8/14/22 1725)   0.9% sodium chloride BOLUS (0 mLs Intravenous Stopped 8/14/22 1925)   0.9% sodium chloride BOLUS (0 mLs Intravenous Stopped 8/14/22 2056)   ketorolac (TORADOL) injection 30 mg (30 mg Intravenous Given 8/14/22 2053)   acetaminophen (TYLENOL) tablet 975 mg (975 mg Oral Given 8/14/22 2053)        Assessments & Plan (with Medical Decision Making)   Labs notable for a leukocytosis of 11.4 with a left shift.  Lactic acid mildly elevated at 2.6.  The exact cause is not clear.  We will draw blood cultures and give IV fluids.  We will recheck lactic acid after fluid bolus.  No empiric antibiotics yet.  Normal electrolytes.  Normal renal function.    Preliminary CT scan is negative for acute cholecystitis, parastomal hernia, small bowel obstruction, or any other cause for patient's symptoms    Patient continues to have significant pain despite single dose of Dilaudid.      Will sign out to evening provider plan to follow-up on CT scan final read, repeat lactic, urinalysis, reassess, disposition accordingly.    If patient continues to be symptomatic despite negative work-up, may ultimately need admission for pain control and serial abdominal exams.    I have reviewed the nursing notes. I have reviewed the findings, diagnosis, plan and need for follow up with the patient.    Discharge Medication List as of 8/14/2022 10:37 PM      START taking these medications    Details   Lidocaine (LIDOCARE) 4 % Patch Place 1 patch onto the skin every 24 hours To prevent lidocaine toxicity, patient should be patch free for 12 hrs daily.Disp-14 patch, R-0Local Print      methocarbamol (ROBAXIN) 500 MG tablet Take 2 tablets (1,000 mg) by mouth 3 times daily as needed for muscle spasms, Disp-30 tablet, R-1, Local Print      ondansetron (ZOFRAN ODT) 4 MG ODT tab Take 1 tablet (4 mg) by mouth every 8 hours as needed for nausea, Disp-20 tablet, R-1, Local Print             Final  diagnoses:   Abdominal pain, right upper quadrant   Parastomal hernia without obstruction or gangrene   I, Sonam Adair, am serving as a trained medical scribe to document services personally performed by Edward Ashraf DO, based on the provider's statements to me.     IEdward DO, was physically present and have reviewed and verified the accuracy of this note documented by Sonam Adair.      --  Edward Ashraf DO  [facility] EMERGENCY DEPARTMENT  8/14/2022     Edward Ashraf DO  08/23/22 0875

## 2022-08-15 ENCOUNTER — PATIENT OUTREACH (OUTPATIENT)
Dept: FAMILY MEDICINE | Facility: CLINIC | Age: 47
End: 2022-08-15

## 2022-08-15 DIAGNOSIS — G89.29 OTHER CHRONIC PAIN: ICD-10-CM

## 2022-08-15 NOTE — TELEPHONE ENCOUNTER
Please see previous EMERGENCY DEPARTMENT F/U note written in blue.   Routed to Dr Naidu for review and as FYI.  Patient plane to follow up at [place].  Miguel MARR

## 2022-08-15 NOTE — DISCHARGE INSTRUCTIONS
Colorectal surgery recommend you buy a hernia belt as this will help with discomfort due to parastomal hernia.  We have prescribed you Robaxin as well as lidocaine patches can be placed on the area of discomfort.  We will also prescribe Zofran that can help with any nausea you experience.  A referral for colorectal surgery for purposes of follow-up is been made for you.  They should contact you to set up an appointment.

## 2022-08-15 NOTE — TELEPHONE ENCOUNTER
What type of discharge? Emergency Department  Risk of Hospital admission or ED visit: 79%  Is a TCM episode required? No  When should the patient follow up with PCP? 7 days of discharge.    Miguel Bond RN  [facility]

## 2022-08-15 NOTE — CONSULTS
Colorectal Surgery Consult Note    HPI:   Doretha Yu is a 47 year old female who presented to [facility] on 8/14/2022 d/2 RUQ pain near her ostomy stoma. She has a pmh significant for laparoscopic TAC w/ EIO d/2 UC (6/13/2021). She also has a hx of malignant melanoma (oncologist at [place]; left axillary mass; dx 10/10/2017) s/p axillary lymphadenectomy followed by immunotherapy Nivolimumab; 2017; course c/b therapy induced colitis).     Colorectal surgery was consulted to evaluate abdominal pain and provide recs for possible fat-containing parastomal hernia seen on CT A/P.    -HA x1wk, intermittent  -emesis on 8/11; none since  -decreased appetite; does not eat much and tries to take liquids as tolerated  -has vague LUQ pain for past 3-4 days - recently underwent a PET scan (6/2/2022) w/ focal FDG uptake at gastric fundus, antrum, and pylorus c/f inflammation vs remote submucosal mets  -has sharp RUQ pain starting 8/11  -work this AM w/ diaphoresis - pt is not menopausal, still has regular cycles  -still empties ostomy bag 5-6x daily  -endorses acid reflux w/ heart burn  -takes chronic prednisone 5mg daily for inflammatory arthritis    ROS:  10 system ROS negative unless otherwise indicated    PMH:  -SANDRA/MDD  -Insomnia  -Migraines  -TIA  -Asthma  -STORMY  -Basal cell carcinoma  -Malignant Melanoma  -Ulcerative Colitis  -T2DM  -GERD  -Inflammatory Arthritis      PSH:  -Laparoscopic total abdominal colectomy w/ end ileostomy (6/15/2021)  -Mohs (7/25/2017)  -Appendectomy (2004)    ALLERGIES:  Bee venom, Azithromycin, Erythromycin, Fentanyl, Prochlorperazine, Buspirone, Erythrocin, Zithromax [azithromycin dihydrate], and Enbrel [etanercept]    FamHx:  family history includes Arthritis in her maternal grandmother; Asthma in her daughter; Blood Disease in her maternal grandfather and maternal grandmother; Cancer in her maternal grandmother and paternal grandmother; Cancer (age of onset: 45) in her mother; Cancer - colorectal in  her paternal grandmother; Cerebrovascular Disease in her maternal grandfather; Colitis in her father, paternal grandfather, and paternal grandmother; Colon Cancer in her father, paternal grandfather, and paternal grandmother; Depression in her father, maternal grandmother, and sister; Diabetes in her maternal grandfather and maternal grandmother; Diverticulitis in her father, paternal grandfather, and paternal grandmother; Gastrointestinal Disease in her father; Glaucoma in her maternal grandfather and maternal grandmother; Heart Disease in her daughter and maternal grandfather; Lipids in her father; Macular Degeneration in her maternal grandmother; Neurologic Disorder in her mother; Respiratory in her paternal grandfather.    SocHx:  -Smoking: quit ~ (5 pack year hx)  -EtOH: previously  -Recreation drugs: denies    Objective:  Vitals:  B/P: 118/79, T: 97.9, P: 80, R: 16    Temp: 97.9  F (36.6  C) Temp  Min: 97.9  F (36.6  C)  Max: 98.1  F (36.7  C)  Resp: 16 Resp  Min: 16  Max: 16  SpO2: 97 % SpO2  Min: 97 %  Max: 99 %  Pulse: 80 Pulse  Min: 80  Max: 94    No data recorded  BP: 118/79 Systolic (24hrs), Av , Min:118 , Max:118   Diastolic (24hrs), Av, Min:79, Max:86    Intake/Output Summary (Last 24 hours) at 2022 1902  Last data filed at 2022 1739  Gross per 24 hour   Intake 1000 ml   Output --   Net 1000 ml       Physical Exam:  Gen: well-dress; moderate,  N:AOx3  HEENT: trachea midline, atraumatic, anicteric  P: normal WOB on RA  CV: RRR on monitor; no JVD  GI: soft, ND, mildly TTP at RUQ and LUQ, no voluntary or involuntary guarding, no rigidity - nonperitonitic; ileostomy in RUQ patent and healthy pink appearing and watery, green, and sedimentous output in the bag  : deferred  MSK: moves all extremties  Extremities: WWP    Labs:  Recent Labs   Lab 22  1435   WBC 11.4*   HGB 12.5        Recent Labs   Lab 22  1435      POTASSIUM 4.1   CHLORIDE 102   CO2 21*   BUN  13.1   CR 0.67   *     Recent Labs   Lab 08/14/22  1435   AST 25   ALT 40*   ALKPHOS 77   BILITOTAL 0.2   ALBUMIN 4.6     Recent Labs   Lab 08/14/22  1750 08/14/22  1516 08/14/22  1435   LACT 2.3* 2.6*  --    LIPASE  --   --  23       Studies:  Recent Results (from the past 24 hour(s))   CT Abdomen Pelvis w Contrast    Narrative    EXAMINATION: TEMPORARY, 8/14/2022 3:36 PM    TECHNIQUE:  Helical CT images from the lung bases through the  symphysis pubis were obtained with IV contrast.     Contrast dose: 135 cc Isovue-370    COMPARISON: CT chest, abdomen, and pelvis 1/7/2022    HISTORY: Right upper quadrant pain with positive Mandel sign. History  of ulcerative colitis with right upper quadrant ileostomy.    FINDINGS:    Abdomen and pelvis: Wedge-shaped area of hypoattenuation in in the  right hepatic dome (series 2, image 20) The remainder of the liver is  within normal limits. The gallbladder, biliary system, and spleen are  within normal limits. Fatty replacement of the pancreatic parenchyma.  The adrenal glands are unremarkable. Symmetric enhancement of the  kidneys. No hydronephrosis. The urinary bladder is within normal  limits. Reproductive organs are within normal limits.    Postsurgical changes of subtotal colectomy with Mejía's pouch and  right upper quadrant ileostomy. Parastomal herniation of fat. No  abnormally dilated loops of small bowel. No significant free fluid in  the abdomen or pelvis. No free air, portal venous gas, or pneumatosis.  The portal venous system is patent. Normal caliber abdominal aorta. No  enlarged abdominal or pelvic lymph nodes.    Lung bases: The cardiac size is normal. No pericardial effusion. The  lung bases are clear.    Bones and soft tissues: The soft tissues are within normal limits. No  acute or suspicious osseous lesions.      Impression    IMPRESSION:   1. No acute findings in the abdomen or pelvis to explain the patient's  symptoms. Specifically, no evidence of  cholecystitis.  2. Wedge-shaped area of hypoattenuation in the right hepatic dome, may  represent transient hepatic attenuation difference/focal fatty  infiltration/focal fibrosis.   3. Stable postsurgical changes of subtotal colectomy with Mejía's  pouch and right upper quadrant ileostomy with parastomal herniation of  fat.    I have personally reviewed the examination and initial interpretation  and I agree with the findings.    JERMAINE TRORES MD         SYSTEM ID:  [id number]         Assessment/Recommendations:  Doretha Yu is a 47 year old female who presented to [facility] on 8/14/2022 d/2 RUQ pain near her ostomy stoma. She has a pmh significant for laparoscopic TAC w/ EIO d/2 UC (6/13/2021).     Colorectal surgery was consulted to evaluate abdominal pain and provide recs for possible fat-containing parastomal hernia seen on CT A/P.    Vitals stable and wnl. PE grossly benign - has mild tenderness. Labs demonstrated w/ WBC 11.4 (albeit has inflammatory dx at baseline) and Lacate 2.3 from 2.6. The pt's WBC appears to be her baseline, but may be d/2 her chronic inflammatory arthritis for which she takes prednisone 5mg daily. Given poor PO intake in conjunction with continued IO output, the pt may be dehydrated contributing to elevated lactate. Imaging demonstrated fat parastomal hernia at IO site w/o c/f bowel obstruction.    -pain management w/ robaxin + lidocaine patches; other adjuncts per ED  -repeat Lactate to ensure appropriate downtrend- hydrate as needed  -to wear ostomy hernia belt in meantime; f/up w/ colorectal surgery clinic to evaluate hernia  -no surgical intervention at this time - fat containing hernia w/ bowel obstruction is a nonurgent/emergent issue  -can f/up w/ her oncologist about LUQ pain - may be inflammatory vs malignancy  -give ED precautions (worsening pain, fevers, nausea/vomiting, absent ileostomy output)  -discussed pt w/ Dr. Shirley Rahman MD (Fellow)    Dispo: per ED    JACI WALLACE,  MD  PGY2, General Surgery

## 2022-08-15 NOTE — TELEPHONE ENCOUNTER
"ED/Discharge Protocol    \"Hi, my name is Janee Tay RN, a registered nurse, and I am calling on behalf of Dr. Naidu's office at [facility].  I am calling to follow up and see how things are going for you after your recent visit.\"    \"I see that you were in the (ER/UC/IP) on 8/14/22.    How are you doing now that you are home?\" Patient reports the pain is still there and that she is having pain on left side too. Patient was on a bed in a hallway for 12 hours. Patient would have been admitted but they did not have room for the patient.  Patient was informed that she may have cancer in her stomach lining and told to go home and call her oncologist at [place].  Patient stated that she was told to \"calm down\" but the gut Dr at the [facility] when he told her that she probably has cancer in her gut.  Patient was told that since they were not going to do surgery they were not going to keep her and they sent her home at midnight after telling her she had cancer. Patient is weeping on the phone and is expressing dissatisfaction with her experience at the [facility] EMERGENCY DEPARTMENT.    Writer provided patient with patient [employer] phone number.    Patient denies any thoughts of self harm.  Patient lives with two teenage children and is responsible for their care.    Patient did confirm that she headaches called the [facility] and been in touch with her oncologist's office. They requested that the patient have her records sent from the [facility] down to the [facility] and that they would then get the patient in right away.    Patient has called the [facility] and requested that those records be sent.    Patient did agree to call patient [employer] today after hanging up with the writer.    Patient declined a follow up appointment at the [facility] stating that she just needs to see her oncologist at [place].    Patient is going to go to [facility] today with hard scripts for pain medications and will pick those " up.    Patient will call back to the clinic if there is anything we can help her with.    Hospital F/U     Miguel MARR

## 2022-08-15 NOTE — ED NOTES
Emergency Department Patient Sign-out       Brief HPI:  This is a 47 year old female signed out to me by Dr. Ashraf.  See initial ED Provider note for details of the presentation.         Exam:   Patient Vitals for the past 24 hrs:   BP Temp Temp src Pulse Resp SpO2   08/14/22 1900 118/79 97.9  F (36.6  C) Oral 80 -- 97 %   08/14/22 1322 -- 98.1  F (36.7  C) Oral -- -- --   08/14/22 1320 118/86 -- -- 94 16 99 %           ED RESULTS:   Results for orders placed or performed during the hospital encounter of 08/14/22 (from the past 24 hour(s))   CBC with platelets differential     Status: Abnormal    Collection Time: 08/14/22  2:35 PM    Narrative    The following orders were created for panel order CBC with platelets differential.  Procedure                               Abnormality         Status                     ---------                               -----------         ------                     CBC with platelets and d...[[id number]]  Abnormal            Final result                 Please view results for these tests on the individual orders.   Comprehensive metabolic panel     Status: Abnormal    Collection Time: 08/14/22  2:35 PM   Result Value Ref Range    Sodium 138 136 - 145 mmol/L    Potassium 4.1 3.4 - 5.3 mmol/L    Creatinine 0.67 0.51 - 0.95 mg/dL    Urea Nitrogen 13.1 6.0 - 20.0 mg/dL    Chloride 102 98 - 107 mmol/L    Carbon Dioxide (CO2) 21 (L) 22 - 29 mmol/L    Anion Gap 15 7 - 15 mmol/L    Glucose 111 (H) 70 - 99 mg/dL    Calcium 10.2 (H) 8.6 - 10.0 mg/dL    Protein Total 7.4 6.4 - 8.3 g/dL    Albumin 4.6 3.5 - 5.2 g/dL    Bilirubin Total 0.2 <=1.2 mg/dL    Alkaline Phosphatase 77 35 - 104 U/L    AST 25 10 - 35 U/L    ALT 40 (H) 10 - 35 U/L    GFR Estimate >90 >60 mL/min/1.73m2   Lipase     Status: Normal    Collection Time: 08/14/22  2:35 PM   Result Value Ref Range    Lipase 23 13 - 60 U/L   [place] Draw     Status: None    Collection Time: 08/14/22  2:35 PM    Narrative    The following  orders were created for panel order [place] Draw.  Procedure                               Abnormality         Status                     ---------                               -----------         ------                     Extra Blue Top Tube[[id number]]                              Final result               Extra Red Top Tube[[id number]]                               Final result                 Please view results for these tests on the individual orders.   CBC with platelets and differential     Status: Abnormal    Collection Time: 08/14/22  2:35 PM   Result Value Ref Range    WBC Count 11.4 (H) 4.0 - 11.0 10e3/uL    RBC Count 4.34 3.80 - 5.20 10e6/uL    Hemoglobin 12.5 11.7 - 15.7 g/dL    Hematocrit 38.7 35.0 - 47.0 %    MCV 89 78 - 100 fL    MCH 28.8 26.5 - 33.0 pg    MCHC 32.3 31.5 - 36.5 g/dL    RDW 15.1 (H) 10.0 - 15.0 %    Platelet Count 350 150 - 450 10e3/uL    % Neutrophils 74 %    % Lymphocytes 16 %    % Monocytes 6 %    % Eosinophils 2 %    % Basophils 1 %    % Immature Granulocytes 1 %    NRBCs per 100 WBC 0 <1 /100    Absolute Neutrophils 8.5 (H) 1.6 - 8.3 10e3/uL    Absolute Lymphocytes 1.9 0.8 - 5.3 10e3/uL    Absolute Monocytes 0.7 0.0 - 1.3 10e3/uL    Absolute Eosinophils 0.2 0.0 - 0.7 10e3/uL    Absolute Basophils 0.1 0.0 - 0.2 10e3/uL    Absolute Immature Granulocytes 0.1 <=0.4 10e3/uL    Absolute NRBCs 0.0 10e3/uL   Extra Blue Top Tube     Status: None    Collection Time: 08/14/22  2:35 PM   Result Value Ref Range    Hold Specimen JIC    Extra Red Top Tube     Status: None    Collection Time: 08/14/22  2:35 PM   Result Value Ref Range    Hold Specimen JIC    HCG qualitative Blood     Status: Normal    Collection Time: 08/14/22  2:35 PM   Result Value Ref Range    hCG Serum Qualitative Negative Negative   Lactic acid whole blood     Status: Abnormal    Collection Time: 08/14/22  3:16 PM   Result Value Ref Range    Lactic Acid 2.6 (H) 0.7 - 2.0 mmol/L   CT Abdomen Pelvis w Contrast     Status:  None    Collection Time: 08/14/22  3:48 PM    Narrative    EXAMINATION: TEMPORARY, 8/14/2022 3:36 PM    TECHNIQUE:  Helical CT images from the lung bases through the  symphysis pubis were obtained with IV contrast.     Contrast dose: 135 cc Isovue-370    COMPARISON: CT chest, abdomen, and pelvis 1/7/2022    HISTORY: Right upper quadrant pain with positive Mandel sign. History  of ulcerative colitis with right upper quadrant ileostomy.    FINDINGS:    Abdomen and pelvis: Wedge-shaped area of hypoattenuation in in the  right hepatic dome (series 2, image 20) The remainder of the liver is  within normal limits. The gallbladder, biliary system, and spleen are  within normal limits. Fatty replacement of the pancreatic parenchyma.  The adrenal glands are unremarkable. Symmetric enhancement of the  kidneys. No hydronephrosis. The urinary bladder is within normal  limits. Reproductive organs are within normal limits.    Postsurgical changes of subtotal colectomy with Mejía's pouch and  right upper quadrant ileostomy. Parastomal herniation of fat. No  abnormally dilated loops of small bowel. No significant free fluid in  the abdomen or pelvis. No free air, portal venous gas, or pneumatosis.  The portal venous system is patent. Normal caliber abdominal aorta. No  enlarged abdominal or pelvic lymph nodes.    Lung bases: The cardiac size is normal. No pericardial effusion. The  lung bases are clear.    Bones and soft tissues: The soft tissues are within normal limits. No  acute or suspicious osseous lesions.      Impression    IMPRESSION:   1. No acute findings in the abdomen or pelvis to explain the patient's  symptoms. Specifically, no evidence of cholecystitis.  2. Wedge-shaped area of hypoattenuation in the right hepatic dome, may  represent transient hepatic attenuation difference/focal fatty  infiltration/focal fibrosis.   3. Stable postsurgical changes of subtotal colectomy with Meíja's  pouch and right upper  quadrant ileostomy with parastomal herniation of  fat.    I have personally reviewed the examination and initial interpretation  and I agree with the findings.    JERMAINE TORRES MD         SYSTEM ID:  [id number]   UA with Microscopic reflex to Culture     Status: Normal    Collection Time: 08/14/22  4:55 PM    Specimen: Urine, Midstream   Result Value Ref Range    Color Urine Straw Colorless, Straw, Light Yellow, Yellow    Appearance Urine Clear Clear    Glucose Urine Negative Negative mg/dL    Bilirubin Urine Negative Negative    Ketones Urine Negative Negative mg/dL    Specific Gravity Urine 1.010 1.003 - 1.035    Blood Urine Negative Negative    pH Urine 5.0 5.0 - 7.0    Protein Albumin Urine Negative Negative mg/dL    Urobilinogen Urine Normal Normal, 2.0 mg/dL    Nitrite Urine Negative Negative    Leukocyte Esterase Urine Negative Negative    RBC Urine <1 <=2 /HPF    WBC Urine 0 <=5 /HPF    Squamous Epithelials Urine <1 <=1 /HPF    Narrative    Urine Culture not indicated   Asymptomatic COVID-19 Virus (Coronavirus) by PCR Nasopharyngeal     Status: Normal    Collection Time: 08/14/22  5:13 PM    Specimen: Nasopharyngeal; Swab   Result Value Ref Range    SARS CoV2 PCR Negative Negative    Narrative    Testing was performed using the Xpert Xpress SARS-CoV-2 Assay on the  Cepheid Gene-Xpert Instrument Systems. Additional information about  this Emergency Use Authorization (EUA) assay can be found via the Lab  Guide. This test should be ordered for the detection of SARS-CoV-2 in  individuals who meet SARS-CoV-2 clinical and/or epidemiological  criteria. Test performance is unknown in asymptomatic patients. This  test is for in vitro diagnostic use under the FDA EUA for  laboratories certified under CLIA to perform high complexity testing.  This test has not been FDA cleared or approved. A negative result  does not rule out the presence of PCR inhibitors in the specimen or  target RNA in concentration below the limit  of detection for the  assay. The possibility of a false negative should be considered if  the patient's recent exposure or clinical presentation suggests  COVID-19. This test was validated by the [facility]. This laboratory is certified under  the Clinical Laboratory Improvement Amendments of 1988 (CLIA-88) as  qualified to perform high complexity laboratory testing.     Lactic acid whole blood     Status: Abnormal    Collection Time: 08/14/22  5:50 PM   Result Value Ref Range    Lactic Acid 2.3 (H) 0.7 - 2.0 mmol/L   Lactic acid whole blood     Status: Normal    Collection Time: 08/14/22 10:00 PM   Result Value Ref Range    Lactic Acid 1.3 0.7 - 2.0 mmol/L   Extra Tube     Status: None (In process)    Collection Time: 08/14/22 10:01 PM    Narrative    The following orders were created for panel order Extra Tube.  Procedure                               Abnormality         Status                     ---------                               -----------         ------                     Extra Blue Top Tube[[id number]]                              In process                 Extra Red Top Tube[[id number]]                               In process                 Extra Purple Top Tube[[id number]]                            In process                   Please view results for these tests on the individual orders.       ED MEDICATIONS:   Medications   0.9% sodium chloride BOLUS (0 mLs Intravenous Stopped 8/14/22 1739)     Followed by   sodium chloride 0.9% infusion ( Intravenous New Bag 8/14/22 1739)   0.9% sodium chloride BOLUS (0 mLs Intravenous Stopped 8/14/22 1925)     Followed by   sodium chloride 0.9% infusion ( Intravenous New Bag 8/14/22 1830)   HYDROmorphone (DILAUDID) injection 1 mg (1 mg Intravenous Given 8/14/22 1822)   ondansetron (ZOFRAN) injection 4 mg (has no administration in time range)   HYDROmorphone (PF) (DILAUDID) injection 0.5 mg (0.5 mg Intravenous Given  22 1521)   iopamidol (ISOVUE-370) solution 135 mL (135 mLs Intravenous Given 22 153)   sodium chloride (PF) 0.9% PF flush 84 mL (84 mLs Intravenous Given 22 153)   HYDROmorphone (PF) (DILAUDID) injection 0.5 mg (0.5 mg Intravenous Given 22 1725)   0.9% sodium chloride BOLUS (0 mLs Intravenous Stopped 22)   ketorolac (TORADOL) injection 30 mg (30 mg Intravenous Given 22)   acetaminophen (TYLENOL) tablet 975 mg (975 mg Oral Given 22)     Medical decision makin-year-old female presents to us with a chief complaint of abdominal pain.  CT scan shows no acute cause of her pain although there is evidence of a previously known parastomal hernia containing fat.  This may be the source of her pain.  She was given multiple doses of pain medications.  Pain was persistent so colorectal surgery was consulted.  They recommended a lidocaine patches on the site of the hernia and pain as well as Robaxin.  They will follow-up with her in clinic.  Patient was given a referral as well for colorectal surgery.  She will be contacted to set up an appointment.    Impression:    ICD-10-CM    1. Parastomal hernia without obstruction or gangrene  K43.5 Colorectal Surgery Referral   2. Abdominal pain, right upper quadrant  R10.11        Plan:    Disposition: Discharge      DO Camron Quiroz Benjamin Arthur, DO  [id number]

## 2022-08-15 NOTE — TELEPHONE ENCOUNTER
Requested Prescriptions   Pending Prescriptions Disp Refills    gabapentin (NEURONTIN) 600 MG tablet 120 tablet 5     Sig: TAKE ONE TABLET (600MG) BY MOUTH FOUR TIMES A DAY        There is no refill protocol information for this order

## 2022-08-16 ENCOUNTER — TELEPHONE (OUTPATIENT)
Dept: SURGERY | Facility: CLINIC | Age: 47
End: 2022-08-16

## 2022-08-16 RX ORDER — GABAPENTIN 600 MG/1
TABLET ORAL
Qty: 120 TABLET | Refills: 5 | Status: SHIPPED | OUTPATIENT
Start: 2022-08-16 | End: 2023-07-11

## 2022-08-16 NOTE — TELEPHONE ENCOUNTER
[facility] Call Center    Phone Message    May a detailed message be left on voicemail: no     Reason for Call: Other: Per protocols, send High priority message for parastomal hernia     Action Taken: Message routed to:  [facility] rectal    Travel Screening: Not Applicable

## 2022-08-19 LAB — BACTERIA BLD CULT: NO GROWTH

## 2022-08-21 ENCOUNTER — HEALTH MAINTENANCE LETTER (OUTPATIENT)
Age: 47
End: 2022-08-21

## 2022-09-10 ENCOUNTER — APPOINTMENT (OUTPATIENT)
Dept: GENERAL RADIOLOGY | Facility: CLINIC | Age: 47
End: 2022-09-10
Attending: EMERGENCY MEDICINE
Payer: COMMERCIAL

## 2022-09-10 ENCOUNTER — HOSPITAL ENCOUNTER (EMERGENCY)
Facility: CLINIC | Age: 47
Discharge: HOME | End: 2022-09-10
Attending: EMERGENCY MEDICINE | Admitting: EMERGENCY MEDICINE
Payer: COMMERCIAL

## 2022-09-10 VITALS
HEIGHT: 63 IN | HEART RATE: 83 BPM | BODY MASS INDEX: 43.77 KG/M2 | RESPIRATION RATE: 13 BRPM | SYSTOLIC BLOOD PRESSURE: 120 MMHG | OXYGEN SATURATION: 92 % | DIASTOLIC BLOOD PRESSURE: 83 MMHG | WEIGHT: 247 LBS | TEMPERATURE: 98.5 F

## 2022-09-10 DIAGNOSIS — R07.9 ACUTE CHEST PAIN: ICD-10-CM

## 2022-09-10 DIAGNOSIS — M79.622 PAIN OF LEFT UPPER ARM: ICD-10-CM

## 2022-09-10 LAB
ANION GAP SERPL CALCULATED.3IONS-SCNC: 12 MMOL/L (ref 7–15)
BASOPHILS # BLD AUTO: 0.1 10E3/UL (ref 0–0.2)
BASOPHILS NFR BLD AUTO: 1 %
BUN SERPL-MCNC: 14.4 MG/DL (ref 6–20)
CALCIUM SERPL-MCNC: 9.4 MG/DL (ref 8.6–10)
CHLORIDE SERPL-SCNC: 101 MMOL/L (ref 98–107)
CREAT SERPL-MCNC: 0.88 MG/DL (ref 0.51–0.95)
CRP SERPL-MCNC: 23.38 MG/L
D DIMER PPP FEU-MCNC: 0.49 UG/ML FEU (ref 0–0.5)
DEPRECATED HCO3 PLAS-SCNC: 25 MMOL/L (ref 22–29)
EOSINOPHIL # BLD AUTO: 0.3 10E3/UL (ref 0–0.7)
EOSINOPHIL NFR BLD AUTO: 3 %
ERYTHROCYTE [DISTWIDTH] IN BLOOD BY AUTOMATED COUNT: 14.3 % (ref 10–15)
GFR SERPL CREATININE-BSD FRML MDRD: 82 ML/MIN/1.73M2
GLUCOSE SERPL-MCNC: 130 MG/DL (ref 70–99)
HCT VFR BLD AUTO: 36.1 % (ref 35–47)
HGB BLD-MCNC: 11.9 G/DL (ref 11.7–15.7)
HOLD SPECIMEN: NORMAL
IMM GRANULOCYTES # BLD: 0.1 10E3/UL
IMM GRANULOCYTES NFR BLD: 1 %
LYMPHOCYTES # BLD AUTO: 2.9 10E3/UL (ref 0.8–5.3)
LYMPHOCYTES NFR BLD AUTO: 31 %
MCH RBC QN AUTO: 28.5 PG (ref 26.5–33)
MCHC RBC AUTO-ENTMCNC: 33 G/DL (ref 31.5–36.5)
MCV RBC AUTO: 87 FL (ref 78–100)
MONOCYTES # BLD AUTO: 0.7 10E3/UL (ref 0–1.3)
MONOCYTES NFR BLD AUTO: 8 %
NEUTROPHILS # BLD AUTO: 5.4 10E3/UL (ref 1.6–8.3)
NEUTROPHILS NFR BLD AUTO: 56 %
NRBC # BLD AUTO: 0 10E3/UL
NRBC BLD AUTO-RTO: 0 /100
PLATELET # BLD AUTO: 312 10E3/UL (ref 150–450)
POTASSIUM SERPL-SCNC: 4.1 MMOL/L (ref 3.4–5.3)
RBC # BLD AUTO: 4.17 10E6/UL (ref 3.8–5.2)
SODIUM SERPL-SCNC: 138 MMOL/L (ref 136–145)
TROPONIN T SERPL HS-MCNC: 8 NG/L
TROPONIN T SERPL HS-MCNC: <6 NG/L
WBC # BLD AUTO: 9.5 10E3/UL (ref 4–11)

## 2022-09-10 PROCEDURE — 250N000013 HC RX MED GY IP 250 OP 250 PS 637: Performed by: EMERGENCY MEDICINE

## 2022-09-10 PROCEDURE — 84484 ASSAY OF TROPONIN QUANT: CPT | Performed by: EMERGENCY MEDICINE

## 2022-09-10 PROCEDURE — 93005 ELECTROCARDIOGRAM TRACING: CPT | Performed by: EMERGENCY MEDICINE

## 2022-09-10 PROCEDURE — 71046 X-RAY EXAM CHEST 2 VIEWS: CPT

## 2022-09-10 PROCEDURE — 86140 C-REACTIVE PROTEIN: CPT | Performed by: EMERGENCY MEDICINE

## 2022-09-10 PROCEDURE — 93010 ELECTROCARDIOGRAM REPORT: CPT | Performed by: EMERGENCY MEDICINE

## 2022-09-10 PROCEDURE — 82310 ASSAY OF CALCIUM: CPT | Performed by: EMERGENCY MEDICINE

## 2022-09-10 PROCEDURE — 99285 EMERGENCY DEPT VISIT HI MDM: CPT | Mod: 25 | Performed by: EMERGENCY MEDICINE

## 2022-09-10 PROCEDURE — 36415 COLL VENOUS BLD VENIPUNCTURE: CPT | Performed by: EMERGENCY MEDICINE

## 2022-09-10 PROCEDURE — 85025 COMPLETE CBC W/AUTO DIFF WBC: CPT | Performed by: EMERGENCY MEDICINE

## 2022-09-10 PROCEDURE — 85379 FIBRIN DEGRADATION QUANT: CPT | Performed by: EMERGENCY MEDICINE

## 2022-09-10 RX ORDER — OXYCODONE HYDROCHLORIDE 5 MG/1
10 TABLET ORAL ONCE
Status: DISCONTINUED | OUTPATIENT
Start: 2022-09-10 | End: 2022-09-10 | Stop reason: HOSPADM

## 2022-09-10 RX ORDER — ASPIRIN 81 MG/1
324 TABLET, CHEWABLE ORAL ONCE
Status: COMPLETED | OUTPATIENT
Start: 2022-09-10 | End: 2022-09-10

## 2022-09-10 RX ADMIN — ASPIRIN 81 MG CHEWABLE TABLET 324 MG: 81 TABLET CHEWABLE at 05:06

## 2022-09-10 ASSESSMENT — ACTIVITIES OF DAILY LIVING (ADL)
ADLS_ACUITY_SCORE: 37
ADLS_ACUITY_SCORE: 37

## 2022-09-10 NOTE — ED TRIAGE NOTES
Pt presented to the ER with c/o chest pain that radiates to the L arm, L jaw and neck that started at 0400. Pt denied SOB. Pt reported she did not take medications prior to arrival.    Triage Assessment     Row Name 09/10/22 0459       Triage Assessment (Adult)    Airway WDL WDL       Respiratory WDL    Respiratory WDL WDL       Skin Circulation/Temperature WDL    Skin Circulation/Temperature WDL WDL       Cardiac WDL    Cardiac WDL X;chest pain       Chest Pain Assessment    Chest Pain Location jaw, left;shoulder, left    Character pressure    Precipitating Factors at rest       Peripheral/Neurovascular WDL    Peripheral Neurovascular WDL X;neurovascular assessment upper;neurovascular assessment lower       LUE Neurovascular Assessment    Temperature LUE warm    Color LUE no discoloration    Sensation LUE numbness present  chronic       RUE Neurovascular Assessment    Temperature RUE warm  chronic    Color RUE no discoloration    Sensation RUE numbness present       LLE Neurovascular Assessment    Temperature LLE warm    Color LLE no discoloration    Sensation LLE numbness present  chronic       RLE Neurovascular Assessment    Temperature RLE warm    Color RLE no discoloration    Sensation RLE numbness present  chronic       Cognitive/Neuro/Behavioral WDL    Cognitive/Neuro/Behavioral WDL WDL

## 2022-09-10 NOTE — DISCHARGE INSTRUCTIONS
I am not sure what caused your symptoms with so far everything is looked normal today.  Continue your home medications.  Use acetaminophen as needed for the pain.  Return for worsening pain, rash, fevers, difficulty breathing, or other concerns.  Otherwise follow-up in clinic if not feeling better in the next 2 to 3 days.

## 2022-09-10 NOTE — ED PROVIDER NOTES
History     Chief Complaint   Patient presents with     Chest Pain     Nausea, Vomiting, & Diarrhea     HPI  Doretha Yu is a 47 year old female with history of ulcerative colitis status post partial colectomy with colostomy and metastatic melanoma who presents for left arm and shoulder pain radiating into her jaw and anterior chest.  The patient says that she has been feeling somewhat off over the past couple days with this pressure and tightness in her chest that was very mild.  She went to bed last night feeling okay and then woke up about 1 hour prior to her arrival here with left arm pain radiating into the chest, moderate to severe, sharp.  No known trauma or injury.  She has not taken anything for the pain.  No fevers or chills.  She has nausea but no vomiting.  No shortness of breath, diarrhea, abdominal pain, or rash.    Allergies:  Allergies   Allergen Reactions     Bee Venom Swelling     Azithromycin Diarrhea     Erythromycin      Other reaction(s): GI intolerance, Vomiting     Fentanyl Other (See Comments)     sweating  sweating     Prochlorperazine Fatigue     Other reaction(s): Other (see comments)  Fatigue     Buspirone      Other reaction(s): GI intolerance  vomiting     Erythrocin Nausea and Vomiting     Zithromax [Azithromycin Dihydrate] Diarrhea     Enbrel [Etanercept] Hives and Rash       Problem List:    Patient Active Problem List    Diagnosis Date Noted     Migraines      Priority: Medium     Colostomy in place (H) 07/16/2021     Priority: Medium     S/P colectomy 07/16/2021     Priority: Medium     Ulcerative colitis without complications, unspecified location (H) 06/03/2021     Priority: Medium     Dehydration 04/13/2021     Priority: Medium     Hematochezia 04/13/2021     Priority: Medium     Diarrhea of infectious origin 04/13/2021     Priority: Medium     C. difficile colitis 04/13/2021     Priority: Medium     Adjustment disorder with mixed emotional features 06/16/2020     Priority:  Medium     Polyarthralgia 04/29/2020     Priority: Medium     Drug-induced polyneuropathy (H) 04/29/2020     Priority: Medium     Pain syndrome, chronic 02/12/2020     Priority: Medium     Drug-induced Cushing's syndrome (H) 02/12/2020     Priority: Medium     Diabetes mellitus, iatrogenic (H) 01/28/2020     Priority: Medium     High risk HPV infection 01/28/2020     Priority: Medium     Added automatically from request for surgery [id number]       Immunosuppression (H) 01/28/2020     Priority: Medium     Added automatically from request for surgery [id number]       STORMY (obstructive sleep apnea) 01/27/2020     Priority: Medium     1/2019 (241#)-AHI 24, lowest oxygen saturation was 86%, no periodic limb movement were noted, CPAP 8 cm/H20 was effective.       Secondary lymphedema 01/27/2020     Priority: Medium     Chronic neutrophilia 01/27/2020     Priority: Medium     Cervical high risk HPV (human papillomavirus) test positive 12/13/2019     Priority: Medium     2011 NIL pap.  2015 NIL pap, Neg HPV.  12/13/19 NIL pap , + HR HPV 16. Plan colp.   1/6/19 Failed colp exam secondary to anatomic constraints with gyn. Referred to gyn/onc.   2/25/20 Colpo bx and ECC Negative for dysplasia. Plan cotest in 1 year.   8/20/21 NIL pap, Neg HPV. Plan cotest in 1 year per provider.   7/29/22 Reminder mychart  8/31/22 Reminder call -- left message       Immunosuppressed status (H) 09/05/2019     Priority: Medium     Ulcerative colitis with complication, unspecified location (H) 09/05/2019     Priority: Medium     Morbid obesity (H) 09/05/2019     Priority: Medium     Arthritis, rheumatoid (H) 11/26/2018     Priority: Medium     Hypocalcemia 05/15/2018     Priority: Medium     Anemia 05/14/2018     Priority: Medium     Menstrual irregularity 05/14/2018     Priority: Medium     Arthralgia of both knees 05/12/2018     Priority: Medium     Formatting of this note might be different from the original.  Work-up in progress. There is concern  for inflammatory arthritis.       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     Priority: Medium     Malignant melanoma of left upper extremity including shoulder (H) 10/12/2017     Priority: Medium     Metastatic malignant melanoma (H) 10/10/2017     Priority: Medium     Prothrombin mutation (H) 04/05/2017     Priority: Medium     On daily aspirin 81 mg per hematology's recommendations from 2008       Vision changes 04/01/2017     Priority: Medium     Mild intermittent asthma without complication 11/08/2013     Priority: Medium     Moderate major depression (H) 06/24/2013     Priority: Medium     Anxiety state 09/13/2012     Priority: Medium     Problem list name updated by automated process. Provider to review       Esophageal reflux 09/13/2012     Priority: Medium     DUB (dysfunctional uterine bleeding) 07/28/2011     Priority: Medium     CARDIOVASCULAR SCREENING; LDL GOAL LESS THAN 160 07/28/2011     Priority: Low        Past Medical History:    Past Medical History:   Diagnosis Date     Abnormal MRI      Anxiety      Basal cell carcinoma      Cervical high risk HPV (human papillomavirus) test positive 12/13/2019     Colitis      Depression      Diabetes mellitus, iatrogenic (H) 01/28/2020     Esophageal reflux      Inflammatory arthritis      Insomnia      Intestinal giardiasis 03/05/2018     Lumbago      Lymphedema      Malignant melanoma (H)      Melanoma (H) 10/23/2017      Migraines      Mild persistent asthma      Morbid obesity with BMI of 40.0-44.9, adult (H)      STORMY (obstructive sleep apnea)      Prothrombin deficiency (H)      Stroke (cerebrum) (H)      TIA (transient ischemic attack)      Type 2 diabetes mellitus (H)        Past Surgical History:    Past Surgical History:   Procedure Laterality Date     APPENDECTOMY  2004     COLONOSCOPY N/A 10/18/2017    Procedure: COLONOSCOPY;  Colon;  Surgeon: Debbie Stephens MD;  [facility]     COLONOSCOPY N/A 2018    Procedure: COMBINED COLONOSCOPY, SINGLE OR MULTIPLE BIOPSY/POLYPECTOMY BY BIOPSY;  colon;  Surgeon: Benita Schumacher MD;  Location: [facility]     COLONOSCOPY      multiple since 2018 to present - about 6 total     DISSECT LYMPH NODE AXILLA Left 10/23/2017    Procedure: DISSECT LYMPH NODE AXILLA;  Left Axillary Lymph Node Dissection ;  Surgeon: Laurent Cool MD;  Location: [facility] OR     EXAM UNDER ANESTHESIA PELVIC N/A 2020    Procedure: EXAM UNDER ANESTHESIA, PELVIS; with Cervical Biopsies, Vaginal Biopsy and Endocervical Curettings;  Surgeon: Melina Jung MD;  Location: [facility] OR     GYN SURGERY  ,          LAPAROSCOPIC ASSISTED COLECTOMY N/A 06/15/2021    Procedure: laparoscopic total abdominal colectomy, end ileostomy;  Surgeon: Quinton Ram MD;  Location: [facility] OR     REPAIR MOHS Left 2017    Procedure: REPAIR MOHS;  Left Upper Lid Moh's Reconstruction;  Surgeon: Kisha Bosch MD;  Location: [facility] OR       Family History:    Family History   Problem Relation Age of Onset     Cancer Mother 45        lung     Neurologic Disorder Mother         epilepsy     Lipids Father      Gastrointestinal Disease Father         diverticulitis      Depression Father      Colitis Father      Colon Cancer Father      Diverticulitis Father      Cancer Maternal Grandmother      Blood Disease Maternal Grandmother         lymphoma      Arthritis Maternal Grandmother      Diabetes Maternal  Grandmother      Depression Maternal Grandmother      Macular Degeneration Maternal Grandmother      Glaucoma Maternal Grandmother      Diabetes Maternal Grandfather      Cerebrovascular Disease Maternal Grandfather      Blood Disease Maternal Grandfather      Heart Disease Maternal Grandfather      Glaucoma Maternal Grandfather      Cancer Paternal Grandmother      Cancer - colorectal Paternal Grandmother      Colitis Paternal Grandmother      Colon Cancer Paternal Grandmother      Diverticulitis Paternal Grandmother      Respiratory Paternal Grandfather         emphysema      Colitis Paternal Grandfather      Colon Cancer Paternal Grandfather      Diverticulitis Paternal Grandfather      Heart Disease Daughter      Asthma Daughter      Depression Sister      Melanoma No family hx of        Social History:  Marital Status:   [4]  Social History     Tobacco Use     Smoking status: Former Smoker     Packs/day: 1.00     Years: 5.00     Pack years: 5.00     Quit date: 3/20/1998     Years since quittin.4     Smokeless tobacco: Never Used   Vaping Use     Vaping Use: Never used   Substance Use Topics     Alcohol use: Not Currently     Drug use: No        Medications:    acetaminophen (TYLENOL) 325 MG tablet  Alcohol Swabs (ALCOHOL PREP) 70 % PADS  blood glucose (NO BRAND SPECIFIED) lancets standard  blood glucose (NO BRAND SPECIFIED) test strip  Calcium Carb-Cholecalciferol 600-800 MG-UNIT TABS  calcium carbonate (TUMS) 500 MG chewable tablet  cyanocobalamin (VITAMIN B-12) 1000 MCG tablet  gabapentin (NEURONTIN) 600 MG tablet  hydrOXYzine (ATARAX) 25 MG tablet  Lidocaine (LIDOCARE) 4 % Patch  loperamide (IMODIUM A-D) 2 MG tablet  loperamide (IMODIUM A-D) 2 MG tablet  metFORMIN (GLUCOPHAGE) 500 MG tablet  methocarbamol (ROBAXIN) 500 MG tablet  Ostomy Supplies MISC  pantoprazole (PROTONIX) 40 MG EC tablet  predniSONE (DELTASONE) 1 MG tablet  simethicone (MYLICON) 80 MG chewable tablet  venlafaxine (EFFEXOR) 75  "MG tablet  VITAMIN D3 25 MCG (1000 UT) tablet  VITRON-C  MG TABS tablet          Review of Systems  Pertinent positives and negatives listed in the HPI, all other systems reviewed and are negative.    Physical Exam   BP: 131/88  Pulse: 90  Temp: 98.5  F (36.9  C)  Resp: 18  Height: 160 cm (5' 3\")  Weight: 112 kg (247 lb)  SpO2: 99 %      Physical Exam  Vitals and nursing note reviewed.   Constitutional:       General: She is in acute distress.      Appearance: She is well-developed. She is not diaphoretic.   HENT:      Head: Normocephalic and atraumatic.      Right Ear: External ear normal.      Left Ear: External ear normal.      Nose: Nose normal.   Eyes:      General: No scleral icterus.     Conjunctiva/sclera: Conjunctivae normal.   Cardiovascular:      Rate and Rhythm: Normal rate and regular rhythm.      Heart sounds: No murmur heard.  Pulmonary:      Effort: Pulmonary effort is normal. No respiratory distress.      Breath sounds: No stridor.   Abdominal:      General: There is no distension.      Palpations: Abdomen is soft.      Tenderness: There is no abdominal tenderness.   Musculoskeletal:      Cervical back: Normal range of motion.      Comments: Left Shoulder: no deformity, erythema or warmth appreciated; mild tenderness over the triceps muscle and the AC joint; no tenderness over clavicle, scapula, coracoid, or proximal humerus; full ROM   Skin:     General: Skin is warm and dry.   Neurological:      Mental Status: She is alert and oriented to person, place, and time.   Psychiatric:         Behavior: Behavior normal.         ED Course                 Procedures              EKG Interpretation:      Interpreted by Steve Cordova MD  Time reviewed: 0510  Symptoms at time of EKG: Chest pain   Rhythm: normal sinus   Rate: Normal  Axis: Normal  Ectopy: none  Conduction: normal  ST Segments/ T Waves: No acute ischemic changes  Q Waves: none  Comparison to prior: Unchanged    Clinical Impression: " no acute changes      Critical Care time:  none               Results for orders placed or performed during the hospital encounter of 09/10/22 (from the past 24 hour(s))   CBC with Platelets & Differential    Narrative    The following orders were created for panel order CBC with Platelets & Differential.  Procedure                               Abnormality         Status                     ---------                               -----------         ------                     CBC with platelets and d...[[id number]]                      Final result                 Please view results for these tests on the individual orders.   Basic metabolic panel   Result Value Ref Range    Creatinine 0.88 0.51 - 0.95 mg/dL    Sodium 138 136 - 145 mmol/L    Potassium 4.1 3.4 - 5.3 mmol/L    Urea Nitrogen 14.4 6.0 - 20.0 mg/dL    Chloride 101 98 - 107 mmol/L    Carbon Dioxide (CO2) 25 22 - 29 mmol/L    Anion Gap 12 7 - 15 mmol/L    Glucose 130 (H) 70 - 99 mg/dL    GFR Estimate 82 >60 mL/min/1.73m2    Calcium 9.4 8.6 - 10.0 mg/dL   Troponin T, High Sensitivity   Result Value Ref Range    Troponin T, High Sensitivity <6 <=14 ng/L   CBC with platelets and differential   Result Value Ref Range    WBC Count 9.5 4.0 - 11.0 10e3/uL    RBC Count 4.17 3.80 - 5.20 10e6/uL    Hemoglobin 11.9 11.7 - 15.7 g/dL    Hematocrit 36.1 35.0 - 47.0 %    MCV 87 78 - 100 fL    MCH 28.5 26.5 - 33.0 pg    MCHC 33.0 31.5 - 36.5 g/dL    RDW 14.3 10.0 - 15.0 %    Platelet Count 312 150 - 450 10e3/uL    % Neutrophils 56 %    % Lymphocytes 31 %    % Monocytes 8 %    % Eosinophils 3 %    % Basophils 1 %    % Immature Granulocytes 1 %    NRBCs per 100 WBC 0 <1 /100    Absolute Neutrophils 5.4 1.6 - 8.3 10e3/uL    Absolute Lymphocytes 2.9 0.8 - 5.3 10e3/uL    Absolute Monocytes 0.7 0.0 - 1.3 10e3/uL    Absolute Eosinophils 0.3 0.0 - 0.7 10e3/uL    Absolute Basophils 0.1 0.0 - 0.2 10e3/uL    Absolute Immature Granulocytes 0.1 <=0.4 10e3/uL    Absolute NRBCs 0.0  10e3/uL   CRP Inflammation   Result Value Ref Range    CRP Inflammation 23.38 (H) <5.00 mg/L   [place] Draw    Narrative    The following orders were created for panel order [place] Draw.  Procedure                               Abnormality         Status                     ---------                               -----------         ------                     Extra Blue Top Tube[[id number]]                              Final result                 Please view results for these tests on the individual orders.   Extra Blue Top Tube   Result Value Ref Range    Hold Specimen JIC    D dimer quantitative   Result Value Ref Range    D-Dimer Quantitative 0.49 0.00 - 0.50 ug/mL FEU    Narrative    This D-dimer assay is intended for use in conjunction with a clinical pretest probability assessment model to exclude pulmonary embolism (PE) and deep venous thrombosis (DVT) in outpatients suspected of PE or DVT. The cut-off value is 0.50 ug/mL FEU.   XR Chest 2 Views    Narrative    EXAM: XR CHEST 2 VIEWS  LOCATION: [facility]  DATE/TIME: 9/10/2022 6:01 AM    INDICATION: chest pain  COMPARISON: 1/6/2022      Impression    IMPRESSION: The cardiac silhouette is normal in size and contour. The lungs are clear bilaterally. Surgical clips are again seen in the left axilla.     *Note: Due to a large number of results and/or encounters for the requested time period, some results have not been displayed. A complete set of results can be found in Results Review.       Medications   oxyCODONE (ROXICODONE) tablet 10 mg (has no administration in time range)   aspirin (ASA) chewable tablet 324 mg (324 mg Oral Given 9/10/22 0506)       Assessments & Plan (with Medical Decision Making)   47-year-old female who presents for chest pain and arm pain.  Vital signs are reassuring with a heart of 92, SPO2 is 99% on room air, blood pressure is 130/82.  EKG is sinus rhythm without signs of ischemia or dysrhythmia.  She is  given aspirin for the pain.  We will plan to count is 9.5 which is reassuring and her hemoglobin is 11.9, no signs of anemia.  No signs of cellulitis or abscess on exam.  No signs of septic arthritis.  No signs of zoster.  D-dimer is normal, unlikely DVT or pulmonary embolism.  Troponin is normal.  Chest x-ray obtained, images reviewed independently as well as radiology read reviewed, no signs of infiltrate to suggest pneumonia, no signs of pneumothorax.  She has mildly elevated CRP, nondiagnostic and this is near where she has been in the recent past.  Repeat troponin is again normal making ACS unlikely.  The patient is safe to discharge with reassurance and instructions to return if she has worsening of her symptoms or other concerns, otherwise follow-up in clinic.  The patient is in agreement with this plan.    I have reviewed the nursing notes.    I have reviewed the findings, diagnosis, plan and need for follow up with the patient.       New Prescriptions    No medications on file       Final diagnoses:   Acute chest pain   Pain of left upper arm       9/10/2022   [facility] EMERGENCY DEPT     Steve Cordova MD  09/10/22 0713

## 2022-09-13 NOTE — PROGRESS NOTES
"Colon and Rectal Surgery Clinic Note    RE: Doretha Yu.  : 1975.  JUNIOR: 2022.    Reason for visit: discuss completion proctectomy and IPAA creation     HPI: Doretha Fernandez is a 46 year old female with ulcerative colitis. She presents today to discuss proctectomy and J pouch.  On 6/15/2021 I took her to the OR for a TAC with end ileostomy (laparoscopic). She presented to the ER on 2022 for abdominal pain and vomiting. CT Abdomen/pelvis was negative for any acute findings. There was a parastomal hernia noted. The last time I saw Doretha she was on prednisone for her arthritis.  The last time she saw Dr. Boudreaux in gastroenterology was before her surgery.  She meets with a bariatric medicine specialist through [facility] next week and she will be considered for medical weight loss strategies.    Interval History: Today Doretha thinks she has lost no weight in the past year.  She is 5 mg on prednisone - per day, down from 25 mg when I last saw her.  She has recently started medicinal marijuana and is going to be decreasing down to 4 mg today.  She is hopeful that she will soon be able to get off of the steroids.     She is actually feeling pretty well with the medicinal marijuana.  She says that with the medical marijuana she is able to move around more and has more energy to do activities (mostly chores around the house).      She is having a constant amount of \"bothersome\" discomfort, then at times the pain gets much worse for a limited amount of time.  This sent her to the hospital in August.  She also has a feeling that food is getting stuck when she swallows - she has an EGD planned down at [facility] next week.      CT Abdomen/Pelvis (2022):                                                             IMPRESSION:   1. No acute findings in the abdomen or pelvis to explain the patient's  symptoms. Specifically, no evidence of cholecystitis.  2. Wedge-shaped area of hypoattenuation in the right hepatic dome, " may  represent transient hepatic attenuation difference/focal fatty  infiltration/focal fibrosis.   3. Stable postsurgical changes of subtotal colectomy with Mejía's  pouch and right upper quadrant ileostomy with parastomal herniation of  fat.      Medications:  Current Outpatient Medications   Medication Sig Dispense Refill     acetaminophen (TYLENOL) 325 MG tablet Take 3 tablets (975 mg) by mouth every 8 hours As scheduled for the next 7-10 days, then decrease both dose & frequency to as needed there after. 90 tablet 0     Alcohol Swabs (ALCOHOL PREP) 70 % PADS 1 applicator 3 times daily 100 each 0     blood glucose (NO BRAND SPECIFIED) lancets standard Use to test blood sugar 3 times daily or as directed. 1 each 0     blood glucose (NO BRAND SPECIFIED) test strip Use to test blood sugar 3 times daily or as directed. 200 strip 0     Calcium Carb-Cholecalciferol 600-800 MG-UNIT TABS Take 800 mg by mouth every morning (before breakfast) 90 tablet 3     calcium carbonate (TUMS) 500 MG chewable tablet Take 1 tablet (500 mg) by mouth 3 times daily as needed for heartburn 30 tablet 0     cyanocobalamin (VITAMIN B-12) 1000 MCG tablet Take 1 tablet (1,000 mcg) by mouth daily       gabapentin (NEURONTIN) 600 MG tablet TAKE ONE TABLET (600MG) BY MOUTH FOUR TIMES A  tablet 5     hydrOXYzine (ATARAX) 25 MG tablet Take 1 tablet (25 mg) by mouth every 6 hours as needed for anxiety 40 tablet 0     Lidocaine (LIDOCARE) 4 % Patch Place 1 patch onto the skin every 24 hours To prevent lidocaine toxicity, patient should be patch free for 12 hrs daily. 14 patch 0     loperamide (IMODIUM A-D) 2 MG tablet Take 2 tabs daily and increase as needed until output is apple sauce consistency. Max of 8 tabs (16 mg) per day. 240 tablet 3     loperamide (IMODIUM A-D) 2 MG tablet Take 2 tablets (4 mg) by mouth 4 times daily as needed for diarrhea 2 tablet 3     metFORMIN (GLUCOPHAGE) 500 MG tablet Take 2 tablets (1,000 mg) by mouth 2  times daily (with meals) 360 tablet 0     methocarbamol (ROBAXIN) 500 MG tablet Take 2 tablets (1,000 mg) by mouth 3 times daily as needed for muscle spasms 30 tablet 1     Ostomy Supplies MISC 20 each daily 20 each 11     pantoprazole (PROTONIX) 40 MG EC tablet TAKE 1 TABLET BY MOUTH EVERY MORNING BEFORE BREAKFAST 90 tablet 0     predniSONE (DELTASONE) 1 MG tablet Take 10 mg daily. Taper 1 mg every 2 weeks (Patient taking differently: Take 5 mg by mouth daily Take 10 mg daily. Taper 1 mg every 2 weeks) 120 tablet 2     simethicone (MYLICON) 80 MG chewable tablet Take 1-2 tablets ( mg) by mouth 4 times daily as needed for cramping 120 tablet 0     venlafaxine (EFFEXOR) 75 MG tablet Take 2 tablets (150 mg) by mouth 2 times daily 120 tablet 5     VITAMIN D3 25 MCG (1000 UT) tablet TAKE THREE TABLETS BY MOUTH ONCE DAILY 300 tablet 1     VITRON-C  MG TABS tablet Take 1 tablet by mouth daily 30 tablet 1       ROS:  A complete review of systems was performed with the patient and all systems negative except as per HPI.    Physical Examination:  There were no vitals taken for this visit.  General: Well hydrated. No acute distress.  Abdomen: Soft, NT, RLQ in place      ASSESSMENT  46 y/o lady with UC, still on steroids (but weaning) and morbid obesity, desiring proctectomy and IPAA.     Risks, benefits, and alternatives of operative treatment were thoroughly discussed with the patient, he/she understands these well and agrees to proceed.    PLAN  1. MRE to rule out crohn's disease  2. Encourage follow up with medical weight loss at [facility] and will monitor results over next few months  3. Encouraged medical marijuana and titration off of steroids - I discussed with her that she cannot have pouch creation while still on any prednisone; I'm so please that she has been able to decrease her use down to 4 mg  4. Follow up in December to see if she is off steroids and if she has lost weight - OK for video visit  5. EGD  through [facility] - encouraged her to have this done given her dysphagia issues  6. Maybe IPAA in the new year if she is doing well at December visit  7. Lengthy discussion on IPAA, including possibility of pouch failure and need for excision and permanent ileostomy, at least six pudding-consistency stools per day, and risks of major abdominal surgery    45 minutes spent on the date of the encounter doing chart review, history and exam, imaging review, documentation and further activities as noted above.    Quinton Ram MD, PhD    Division of Colon and Rectal Surgery  [facility]    Referring Provider:  Referred Self, MD  No address on file     Primary Care Provider:  Anna Naidu

## 2022-09-15 ENCOUNTER — TRANSFERRED RECORDS (OUTPATIENT)
Dept: HEALTH INFORMATION MANAGEMENT | Facility: CLINIC | Age: 47
End: 2022-09-15

## 2022-09-15 LAB
ALT SERPL-CCNC: 31 U/L (ref 7–45)
AST SERPL-CCNC: 22 U/L (ref 8–43)
CREATININE (EXTERNAL): 0.95 MG/DL (ref 0.59–1.04)
GLUCOSE (EXTERNAL): 107 MG/DL (ref 70–140)
POTASSIUM (EXTERNAL): 4.3 MMOL/L (ref 3.6–5.2)
TSH SERPL-ACNC: 2.1 MIU/L (ref 0.3–4.2)

## 2022-09-20 ENCOUNTER — OFFICE VISIT (OUTPATIENT)
Dept: SURGERY | Facility: CLINIC | Age: 47
End: 2022-09-20
Payer: COMMERCIAL

## 2022-09-20 VITALS
BODY MASS INDEX: 44.01 KG/M2 | HEIGHT: 63 IN | HEART RATE: 76 BPM | OXYGEN SATURATION: 100 % | DIASTOLIC BLOOD PRESSURE: 86 MMHG | SYSTOLIC BLOOD PRESSURE: 123 MMHG | WEIGHT: 248.4 LBS

## 2022-09-20 DIAGNOSIS — K51.919 ULCERATIVE COLITIS WITH COMPLICATION, UNSPECIFIED LOCATION (H): Primary | ICD-10-CM

## 2022-09-20 PROCEDURE — 99215 OFFICE O/P EST HI 40 MIN: CPT | Performed by: SURGERY

## 2022-09-20 ASSESSMENT — PAIN SCALES - GENERAL: PAINLEVEL: SEVERE PAIN (6)

## 2022-09-20 NOTE — NURSING NOTE
"Chief Complaint   Patient presents with     RECHECK     Follow-up       Vitals:    09/20/22 0854   BP: 123/86   BP Location: Left arm   Patient Position: Sitting   Cuff Size: Adult Large   Pulse: 76   SpO2: 100%   Weight: 248 lb 6.4 oz   Height: 5' 3\"       Body mass index is 44 kg/m .     Phani Troncoso, EMT- P    "

## 2022-09-20 NOTE — LETTER
"2022       RE: Doretha Yu  [address]  [facility] [address]     Dear Colleague,    Thank you for referring your patient, Doretha Yu, to the [facility] COLON AND RECTAL SURGERY CLINIC [place] [facility]. Please see a copy of my visit note below.    Colon and Rectal Surgery Clinic Note    RE: Doretha Yu.  : 1975.  JUNIOR: 2022.    Reason for visit: discuss completion proctectomy and IPAA creation     HPI: Doretha Fernandez is a 46 year old female with ulcerative colitis. She presents today to discuss proctectomy and J pouch.  On 6/15/2021 I took her to the OR for a TAC with end ileostomy (laparoscopic). She presented to the ER on 2022 for abdominal pain and vomiting. CT Abdomen/pelvis was negative for any acute findings. There was a parastomal hernia noted. The last time I saw Doretha she was on prednisone for her arthritis.  The last time she saw Dr. Boudreaux in gastroenterology was before her surgery.  She meets with a bariatric medicine specialist through [place] next week and she will be considered for medical weight loss strategies.    Interval History: Today Doretha thinks she has lost no weight in the past year.  She is 5 mg on prednisone - per day, down from 25 mg when I last saw her.  She has recently started medicinal marijuana and is going to be decreasing down to 4 mg today.  She is hopeful that she will soon be able to get off of the steroids.     She is actually feeling pretty well with the medicinal marijuana.  She says that with the medical marijuana she is able to move around more and has more energy to do activities (mostly chores around the house).      She is having a constant amount of \"bothersome\" discomfort, then at times the pain gets much worse for a limited amount of time.  This sent her to the hospital in August.  She also has a feeling that food is getting stuck when she swallows - she has an EGD planned down " at [place] next week.      CT Abdomen/Pelvis (8/14/2022):                                                             IMPRESSION:   1. No acute findings in the abdomen or pelvis to explain the patient's  symptoms. Specifically, no evidence of cholecystitis.  2. Wedge-shaped area of hypoattenuation in the right hepatic dome, may  represent transient hepatic attenuation difference/focal fatty  infiltration/focal fibrosis.   3. Stable postsurgical changes of subtotal colectomy with Mejía's  pouch and right upper quadrant ileostomy with parastomal herniation of  fat.      Medications:  Current Outpatient Medications   Medication Sig Dispense Refill     acetaminophen (TYLENOL) 325 MG tablet Take 3 tablets (975 mg) by mouth every 8 hours As scheduled for the next 7-10 days, then decrease both dose & frequency to as needed there after. 90 tablet 0     Alcohol Swabs (ALCOHOL PREP) 70 % PADS 1 applicator 3 times daily 100 each 0     blood glucose (NO BRAND SPECIFIED) lancets standard Use to test blood sugar 3 times daily or as directed. 1 each 0     blood glucose (NO BRAND SPECIFIED) test strip Use to test blood sugar 3 times daily or as directed. 200 strip 0     Calcium Carb-Cholecalciferol 600-800 MG-UNIT TABS Take 800 mg by mouth every morning (before breakfast) 90 tablet 3     calcium carbonate (TUMS) 500 MG chewable tablet Take 1 tablet (500 mg) by mouth 3 times daily as needed for heartburn 30 tablet 0     cyanocobalamin (VITAMIN B-12) 1000 MCG tablet Take 1 tablet (1,000 mcg) by mouth daily       gabapentin (NEURONTIN) 600 MG tablet TAKE ONE TABLET (600MG) BY MOUTH FOUR TIMES A  tablet 5     hydrOXYzine (ATARAX) 25 MG tablet Take 1 tablet (25 mg) by mouth every 6 hours as needed for anxiety 40 tablet 0     Lidocaine (LIDOCARE) 4 % Patch Place 1 patch onto the skin every 24 hours To prevent lidocaine toxicity, patient should be patch free for 12 hrs daily. 14 patch 0     loperamide (IMODIUM A-D) 2 MG tablet Take  2 tabs daily and increase as needed until output is apple sauce consistency. Max of 8 tabs (16 mg) per day. 240 tablet 3     loperamide (IMODIUM A-D) 2 MG tablet Take 2 tablets (4 mg) by mouth 4 times daily as needed for diarrhea 2 tablet 3     metFORMIN (GLUCOPHAGE) 500 MG tablet Take 2 tablets (1,000 mg) by mouth 2 times daily (with meals) 360 tablet 0     methocarbamol (ROBAXIN) 500 MG tablet Take 2 tablets (1,000 mg) by mouth 3 times daily as needed for muscle spasms 30 tablet 1     Ostomy Supplies MISC 20 each daily 20 each 11     pantoprazole (PROTONIX) 40 MG EC tablet TAKE 1 TABLET BY MOUTH EVERY MORNING BEFORE BREAKFAST 90 tablet 0     predniSONE (DELTASONE) 1 MG tablet Take 10 mg daily. Taper 1 mg every 2 weeks (Patient taking differently: Take 5 mg by mouth daily Take 10 mg daily. Taper 1 mg every 2 weeks) 120 tablet 2     simethicone (MYLICON) 80 MG chewable tablet Take 1-2 tablets ( mg) by mouth 4 times daily as needed for cramping 120 tablet 0     venlafaxine (EFFEXOR) 75 MG tablet Take 2 tablets (150 mg) by mouth 2 times daily 120 tablet 5     VITAMIN D3 25 MCG (1000 UT) tablet TAKE THREE TABLETS BY MOUTH ONCE DAILY 300 tablet 1     VITRON-C  MG TABS tablet Take 1 tablet by mouth daily 30 tablet 1       ROS:  A complete review of systems was performed with the patient and all systems negative except as per HPI.    Physical Examination:  There were no vitals taken for this visit.  General: Well hydrated. No acute distress.  Abdomen: Soft, NT, RLQ in place      ASSESSMENT  46 y/o lady with UC, still on steroids (but weaning) and morbid obesity, desiring proctectomy and IPAA.     Risks, benefits, and alternatives of operative treatment were thoroughly discussed with the patient, he/she understands these well and agrees to proceed.    PLAN  1. MRE to rule out crohn's disease  2. Encourage follow up with medical weight loss at [place] and will monitor results over next few months  3. Encouraged  medical marijuana and titration off of steroids - I discussed with her that she cannot have pouch creation while still on any prednisone; I'm so please that she has been able to decrease her use down to 4 mg  4. Follow up in December to see if she is off steroids and if she has lost weight - OK for video visit  5. EGD through [facility] - encouraged her to have this done given her dysphagia issues  6. Maybe IPAA in the new year if she is doing well at December visit  7. Lengthy discussion on IPAA, including possibility of pouch failure and need for excision and permanent ileostomy, at least six pudding-consistency stools per day, and risks of major abdominal surgery    45 minutes spent on the date of the encounter doing chart review, history and exam, imaging review, documentation and further activities as noted above.    Quinton Ram MD, PhD    Division of Colon and Rectal Surgery  [facility]    Referring Provider:  Referred Self, MD  No address on file     Primary Care Provider:  Anna Naidu

## 2022-09-21 NOTE — TELEPHONE ENCOUNTER
Left message on machine to call back    Polly BAJWA RN     Island Pedicle Flap Text: The defect edges were debeveled with a #15 scalpel blade.  Given the location of the defect, shape of the defect and the proximity to free margins an island pedicle advancement flap was deemed most appropriate.  Using a sterile surgical marker, an appropriate advancement flap was drawn incorporating the defect, outlining the appropriate donor tissue and placing the expected incisions within the relaxed skin tension lines where possible.    The area thus outlined was incised deep to adipose tissue with a #15 scalpel blade.  The skin margins were undermined to an appropriate distance in all directions around the primary defect and laterally outward around the island pedicle utilizing iris scissors.  There was minimal undermining beneath the pedicle flap.

## 2022-09-23 ENCOUNTER — OFFICE VISIT (OUTPATIENT)
Dept: FAMILY MEDICINE | Facility: CLINIC | Age: 47
End: 2022-09-23
Payer: COMMERCIAL

## 2022-09-23 VITALS
TEMPERATURE: 98 F | OXYGEN SATURATION: 99 % | RESPIRATION RATE: 18 BRPM | HEIGHT: 63 IN | SYSTOLIC BLOOD PRESSURE: 108 MMHG | DIASTOLIC BLOOD PRESSURE: 76 MMHG | BODY MASS INDEX: 43.41 KG/M2 | WEIGHT: 245 LBS | HEART RATE: 100 BPM

## 2022-09-23 DIAGNOSIS — J40 BRONCHITIS: ICD-10-CM

## 2022-09-23 DIAGNOSIS — J02.9 SORE THROAT: Primary | ICD-10-CM

## 2022-09-23 LAB
DEPRECATED S PYO AG THROAT QL EIA: NEGATIVE
GROUP A STREP BY PCR: NOT DETECTED

## 2022-09-23 PROCEDURE — 99213 OFFICE O/P EST LOW 20 MIN: CPT | Performed by: FAMILY MEDICINE

## 2022-09-23 PROCEDURE — 87651 STREP A DNA AMP PROBE: CPT | Performed by: FAMILY MEDICINE

## 2022-09-23 RX ORDER — PREDNISONE 20 MG/1
20 TABLET ORAL DAILY
Qty: 5 TABLET | Refills: 0 | Status: SHIPPED | OUTPATIENT
Start: 2022-09-23 | End: 2022-09-28

## 2022-09-23 RX ORDER — CODEINE PHOSPHATE AND GUAIFENESIN 10; 100 MG/5ML; MG/5ML
1-2 SOLUTION ORAL EVERY 4 HOURS PRN
Qty: 120 ML | Refills: 0 | Status: SHIPPED | OUTPATIENT
Start: 2022-09-23 | End: 2022-12-07

## 2022-09-23 RX ORDER — ALBUTEROL SULFATE 90 UG/1
2 AEROSOL, METERED RESPIRATORY (INHALATION) EVERY 4 HOURS PRN
Qty: 18 G | Refills: 1 | Status: ON HOLD | OUTPATIENT
Start: 2022-09-23 | End: 2023-12-28

## 2022-09-23 ASSESSMENT — ENCOUNTER SYMPTOMS
ACTIVITY CHANGE: 1
RHINORRHEA: 1
SHORTNESS OF BREATH: 1
GASTROINTESTINAL NEGATIVE: 1
NEUROLOGICAL NEGATIVE: 1
ENDOCRINE NEGATIVE: 1
HEMATOLOGIC/LYMPHATIC NEGATIVE: 1
ALLERGIC/IMMUNOLOGIC NEGATIVE: 1
COUGH: 1
SINUS PRESSURE: 1
CARDIOVASCULAR NEGATIVE: 1
MUSCULOSKELETAL NEGATIVE: 1
PSYCHIATRIC NEGATIVE: 1

## 2022-09-23 ASSESSMENT — ASTHMA QUESTIONNAIRES
QUESTION_1 LAST FOUR WEEKS HOW MUCH OF THE TIME DID YOUR ASTHMA KEEP YOU FROM GETTING AS MUCH DONE AT WORK, SCHOOL OR AT HOME: A LITTLE OF THE TIME
ACT_TOTALSCORE: 19
QUESTION_4 LAST FOUR WEEKS HOW OFTEN HAVE YOU USED YOUR RESCUE INHALER OR NEBULIZER MEDICATION (SUCH AS ALBUTEROL): NOT AT ALL
ACT_TOTALSCORE: 19
QUESTION_5 LAST FOUR WEEKS HOW WOULD YOU RATE YOUR ASTHMA CONTROL: SOMEWHAT CONTROLLED
QUESTION_2 LAST FOUR WEEKS HOW OFTEN HAVE YOU HAD SHORTNESS OF BREATH: THREE TO SIX TIMES A WEEK
QUESTION_3 LAST FOUR WEEKS HOW OFTEN DID YOUR ASTHMA SYMPTOMS (WHEEZING, COUGHING, SHORTNESS OF BREATH, CHEST TIGHTNESS OR PAIN) WAKE YOU UP AT NIGHT OR EARLIER THAN USUAL IN THE MORNING: ONCE OR TWICE

## 2022-09-23 ASSESSMENT — PATIENT HEALTH QUESTIONNAIRE - PHQ9
SUM OF ALL RESPONSES TO PHQ QUESTIONS 1-9: 15
10. IF YOU CHECKED OFF ANY PROBLEMS, HOW DIFFICULT HAVE THESE PROBLEMS MADE IT FOR YOU TO DO YOUR WORK, TAKE CARE OF THINGS AT HOME, OR GET ALONG WITH OTHER PEOPLE: SOMEWHAT DIFFICULT
SUM OF ALL RESPONSES TO PHQ QUESTIONS 1-9: 15

## 2022-09-23 ASSESSMENT — PAIN SCALES - GENERAL: PAINLEVEL: MODERATE PAIN (5)

## 2022-09-23 NOTE — PROGRESS NOTES
"  Assessment & Plan     Sore throat  Strep test was negative. Recommend salt water gargles, Tylenol/Ibuprofen as needed  - Streptococcus A Rapid Screen w/Reflex to PCR - Clinic Collect  - Group A Streptococcus PCR Throat Swab    Bronchitis  Likely viral LUNGS:  CTA B/L, no wheezing or crackles. examination was within normal limits.Recommend prednisone and albuterol inhaler.   - albuterol (PROAIR HFA/PROVENTIL HFA/VENTOLIN HFA) 108 (90 Base) MCG/ACT inhaler; Inhale 2 puffs into the lungs every 4 hours as needed for shortness of breath / dyspnea  - predniSONE (DELTASONE) 20 MG tablet; Take 1 tablet (20 mg) by mouth daily for 5 days  - guaiFENesin-codeine (ROBITUSSIN AC) 100-10 MG/5ML solution; Take 5-10 mLs by mouth every 4 hours as needed for cough      20 minutes spent on the date of the encounter doing patient visit and documentation        BMI:   Estimated body mass index is 43.4 kg/m  as calculated from the following:    Height as of this encounter: 1.6 m (5' 3\").    Weight as of this encounter: 111.1 kg (245 lb).   Weight management plan: Discussed healthy diet and exercise guidelines   :[id number]}  FUTURE APPOINTMENTS:       - Follow-up visit in one week or sooner as needed.    Return in about 1 week (around 9/30/2022) for Follow up.    Ollie Cuevas MD  [facility]    Lance Coyne is a 47 year old accompanied by her self, presenting for the following health issues:  Cough (Cough, sore throat, ear pain-bilateral.)      History of Present Illness       Reason for visit:  Cold  Symptom onset:  1-2 weeks ago  Symptoms include:  Throat cough chest ears head  Symptom intensity:  Moderate  Symptom progression:  Worsening  Had these symptoms before:  No  What makes it worse:  No  What makes it better:  No    She eats 2-3 servings of fruits and vegetables daily.She consumes 0 sweetened beverage(s) daily.She exercises with enough effort to increase her heart rate 9 or less minutes " "per day.  She exercises with enough effort to increase her heart rate 3 or less days per week.   She is taking medications regularly.    Today's PHQ-9         PHQ-9 Total Score: 15    PHQ-9 Q9 Thoughts of better off dead/self-harm past 2 weeks :   Not at all    How difficult have these problems made it for you to do your work, take care of things at home, or get along with other people: Somewhat difficult       Acute Illness  Acute illness concerns: Cough, ears, throat.  4 Covid tests have been negative.  Onset/Duration: Over one week.  Symptoms:  Fever: No  Chills/Sweats: hard to tell as she always has that from her medications.  Headache (location?): YES- Front part.  Sinus Pressure: YES- a little, bilateral.  Conjunctivitis:  No  Ear Pain: YES: bilateral  Rhinorrhea: No  Congestion: YES- chest.  Sore Throat: YES  Cough: YES-productive of yellow sputum, productive of green sputum  Wheeze: YES  Decreased Appetite: YES  Nausea: No  Vomiting: No  Diarrhea: No  Dysuria/Freq.: No  Dysuria or Hematuria: No  Fatigue/Achiness: YES  Sick/Strep Exposure: YES-Children's friends have been ill.  Therapies tried and outcome: Tylenol, Ibuprofen, Nyquil.  Takes Prednisone daily.        Review of Systems   Constitutional: Positive for activity change.   HENT: Positive for congestion, rhinorrhea and sinus pressure.    Respiratory: Positive for cough and shortness of breath.    Cardiovascular: Negative.    Gastrointestinal: Negative.    Endocrine: Negative.    Breasts:  negative.    Genitourinary: Negative.    Musculoskeletal: Negative.    Skin: Negative.    Allergic/Immunologic: Negative.    Neurological: Negative.    Hematological: Negative.    Psychiatric/Behavioral: Negative.             Objective    /76   Pulse 100   Temp 98  F (36.7  C) (Tympanic)   Resp 18   Ht 1.6 m (5' 3\")   Wt 111.1 kg (245 lb)   SpO2 99%   BMI 43.40 kg/m    Body mass index is 43.4 kg/m .  Physical Exam  Constitutional:       Appearance: " Normal appearance. She is obese.   HENT:      Head: Normocephalic and atraumatic.      Nose: Nose normal.   Eyes:      Pupils: Pupils are equal, round, and reactive to light.   Cardiovascular:      Rate and Rhythm: Normal rate and regular rhythm.      Pulses: Normal pulses.      Heart sounds: Normal heart sounds.   Pulmonary:      Effort: Pulmonary effort is normal. No respiratory distress.      Breath sounds: Normal breath sounds. No stridor. No wheezing, rhonchi or rales.   Chest:      Chest wall: No tenderness.   Abdominal:      General: Abdomen is flat. Bowel sounds are normal.      Palpations: Abdomen is soft.   Musculoskeletal:         General: Normal range of motion.   Skin:     General: Skin is warm and dry.   Neurological:      Mental Status: She is alert and oriented to person, place, and time.   Psychiatric:         Mood and Affect: Mood normal.         Behavior: Behavior normal.            Results for orders placed or performed in visit on 09/23/22 (from the past 24 hour(s))   Streptococcus A Rapid Screen w/Reflex to PCR - Clinic Collect    Specimen: Throat; Swab   Result Value Ref Range    Group A Strep antigen Negative Negative     *Note: Due to a large number of results and/or encounters for the requested time period, some results have not been displayed. A complete set of results can be found in Results Review.

## 2022-09-26 ENCOUNTER — TELEPHONE (OUTPATIENT)
Dept: FAMILY MEDICINE | Facility: CLINIC | Age: 47
End: 2022-09-26

## 2022-09-26 DIAGNOSIS — J40 BRONCHITIS: Primary | ICD-10-CM

## 2022-09-26 RX ORDER — AZITHROMYCIN 250 MG/1
TABLET, FILM COATED ORAL
Qty: 6 TABLET | Refills: 0 | Status: SHIPPED | OUTPATIENT
Start: 2022-09-26 | End: 2022-10-01

## 2022-09-26 NOTE — TELEPHONE ENCOUNTER
This should go to the physician who saw the patient for this concern on Friday.    Please forward to Dr. Cuevas.    Anna Naidu M.D.

## 2022-09-26 NOTE — TELEPHONE ENCOUNTER
Patient saw Dr. Cuevas 9/23/22 for URI.    Prednisone and Inhaler given.    Patient states she feels worse with a productive cough with green expectorant, some shortness of air and hoarse, sore throat.    Requesting an Antibiotic.    Has Endoscopy scheduled for Wednesday.      [facility].    Elisa MARR

## 2022-09-26 NOTE — TELEPHONE ENCOUNTER
Reason for call:  Patient reporting a symptom    Symptom or request: Pt is reporting her cough is in her chest and it is sore. Would like an antibiotic. Saw Faith Friday 9/23/22 Pls call back to discuss options for care.    Duration (how long have symptoms been present): unknown    Have you been treated for this before? Yes    Additional comments: Faith preformed strep test     Phone Number patient can be reached at:  Home number on file [phone] (home)    Best Time:  any    Can we leave a detailed message on this number:  YES    Call taken on 9/26/2022 at 7:56 AM by Arianna Hickey

## 2022-09-27 RX ORDER — DOXYCYCLINE 100 MG/1
100 CAPSULE ORAL 2 TIMES DAILY
Qty: 20 CAPSULE | Refills: 0 | Status: SHIPPED | OUTPATIENT
Start: 2022-09-27 | End: 2022-12-07

## 2022-09-27 NOTE — TELEPHONE ENCOUNTER
Pt was notified, and verbalized understanding.    Lurdes Bird RN  [facility]

## 2022-09-28 NOTE — TELEPHONE ENCOUNTER
FYI to provider - Patient is lost to pap tracking follow-up. Attempts to contact pt have been made per reminder process and there has been no reply and/or no appt scheduled. Contact hx listed below.     2011 NIL pap.  2015 NIL pap, Neg HPV.  12/13/19 NIL pap , + HR HPV 16. Plan colp.   1/6/19 Failed colp exam secondary to anatomic constraints with gyn. Referred to gyn/onc.   2/25/20 Colpo bx and ECC Negative for dysplasia. Plan cotest in 1 year.   8/20/21 NIL pap, Neg HPV. Plan cotest in 1 year per provider.   7/29/22 Reminder mychart  8/31/22 Reminder call -- left message  9/28/22 Lost to follow-up for pap tracking     Rosibel Smith RN BSN, Pap Tracking

## 2022-10-31 DIAGNOSIS — M19.90 ARTHRITIS: ICD-10-CM

## 2022-10-31 RX ORDER — PREDNISONE 1 MG/1
2 TABLET ORAL DAILY
Qty: 30 TABLET | Refills: 0 | Status: ON HOLD | OUTPATIENT
Start: 2022-10-31 | End: 2023-01-13

## 2022-10-31 NOTE — TELEPHONE ENCOUNTER
Pt does not want to call Rheumatology for her Prednisone refill and wants Dr. Naidu to refill the med. TODAY/[facility]  No need to call patient back, unless there are questions or problems.

## 2022-10-31 NOTE — TELEPHONE ENCOUNTER
While I understand that it is EASIER to call us for refills, I am not the physician MANAGING her prednisone.      What dose is she currently taking, where is she in the taper?  I can do a short term refill, but this should be managed by rheumatology.    Anna Naidu M.D.

## 2022-10-31 NOTE — TELEPHONE ENCOUNTER
"Pt was called about prednisone refill. Pt says this is managed through the [facility], she says she has contacted them but \"won't hear from them until next week.\"   Her prednisone taper went down to 2 mg per day on Friday 10/28 and she has been out for 2 days. She needs about a week's supply.   Pt uses [facility] pharmacy. Floridalma Wilde RN     "

## 2022-11-11 ENCOUNTER — HOSPITAL ENCOUNTER (EMERGENCY)
Facility: CLINIC | Age: 47
Discharge: HOME | End: 2022-11-11
Attending: FAMILY MEDICINE | Admitting: FAMILY MEDICINE
Payer: COMMERCIAL

## 2022-11-11 ENCOUNTER — APPOINTMENT (OUTPATIENT)
Dept: CT IMAGING | Facility: CLINIC | Age: 47
End: 2022-11-11
Attending: FAMILY MEDICINE
Payer: COMMERCIAL

## 2022-11-11 VITALS
OXYGEN SATURATION: 98 % | BODY MASS INDEX: 41.64 KG/M2 | DIASTOLIC BLOOD PRESSURE: 79 MMHG | RESPIRATION RATE: 16 BRPM | TEMPERATURE: 97.2 F | SYSTOLIC BLOOD PRESSURE: 126 MMHG | HEIGHT: 63 IN | WEIGHT: 235 LBS | HEART RATE: 77 BPM

## 2022-11-11 DIAGNOSIS — M62.81 GENERALIZED MUSCLE WEAKNESS: ICD-10-CM

## 2022-11-11 LAB
ALBUMIN SERPL BCG-MCNC: 4.2 G/DL (ref 3.5–5.2)
ALBUMIN UR-MCNC: NEGATIVE MG/DL
ALP SERPL-CCNC: 94 U/L (ref 35–104)
ALT SERPL W P-5'-P-CCNC: 36 U/L (ref 10–35)
ANION GAP SERPL CALCULATED.3IONS-SCNC: 13 MMOL/L (ref 7–15)
APPEARANCE UR: CLEAR
AST SERPL W P-5'-P-CCNC: 37 U/L (ref 10–35)
BACTERIA #/AREA URNS HPF: ABNORMAL /HPF
BASOPHILS # BLD AUTO: 0.1 10E3/UL (ref 0–0.2)
BASOPHILS NFR BLD AUTO: 1 %
BILIRUB SERPL-MCNC: 0.2 MG/DL
BILIRUB UR QL STRIP: NEGATIVE
BUN SERPL-MCNC: 11.5 MG/DL (ref 6–20)
CALCIUM SERPL-MCNC: 9.4 MG/DL (ref 8.6–10)
CHLORIDE SERPL-SCNC: 104 MMOL/L (ref 98–107)
CK SERPL-CCNC: 94 U/L (ref 26–192)
COLOR UR AUTO: YELLOW
CREAT SERPL-MCNC: 0.82 MG/DL (ref 0.51–0.95)
CRP SERPL-MCNC: 14.16 MG/L
DEPRECATED HCO3 PLAS-SCNC: 21 MMOL/L (ref 22–29)
EOSINOPHIL # BLD AUTO: 0.2 10E3/UL (ref 0–0.7)
EOSINOPHIL NFR BLD AUTO: 1 %
ERYTHROCYTE [DISTWIDTH] IN BLOOD BY AUTOMATED COUNT: 13.8 % (ref 10–15)
GFR SERPL CREATININE-BSD FRML MDRD: 89 ML/MIN/1.73M2
GLUCOSE SERPL-MCNC: 83 MG/DL (ref 70–99)
GLUCOSE UR STRIP-MCNC: NEGATIVE MG/DL
HCT VFR BLD AUTO: 37.6 % (ref 35–47)
HGB BLD-MCNC: 12.2 G/DL (ref 11.7–15.7)
HGB UR QL STRIP: NEGATIVE
IMM GRANULOCYTES # BLD: 0.1 10E3/UL
IMM GRANULOCYTES NFR BLD: 1 %
KETONES UR STRIP-MCNC: NEGATIVE MG/DL
LEUKOCYTE ESTERASE UR QL STRIP: NEGATIVE
LIPASE SERPL-CCNC: 15 U/L (ref 13–60)
LYMPHOCYTES # BLD AUTO: 2.8 10E3/UL (ref 0.8–5.3)
LYMPHOCYTES NFR BLD AUTO: 25 %
MAGNESIUM SERPL-MCNC: 2 MG/DL (ref 1.7–2.3)
MCH RBC QN AUTO: 28.1 PG (ref 26.5–33)
MCHC RBC AUTO-ENTMCNC: 32.4 G/DL (ref 31.5–36.5)
MCV RBC AUTO: 87 FL (ref 78–100)
MONOCYTES # BLD AUTO: 0.8 10E3/UL (ref 0–1.3)
MONOCYTES NFR BLD AUTO: 7 %
MUCOUS THREADS #/AREA URNS LPF: PRESENT /LPF
NEUTROPHILS # BLD AUTO: 7.3 10E3/UL (ref 1.6–8.3)
NEUTROPHILS NFR BLD AUTO: 65 %
NITRATE UR QL: NEGATIVE
NRBC # BLD AUTO: 0 10E3/UL
NRBC BLD AUTO-RTO: 0 /100
PH UR STRIP: 5.5 [PH] (ref 5–7)
PLATELET # BLD AUTO: 328 10E3/UL (ref 150–450)
POTASSIUM SERPL-SCNC: 4.3 MMOL/L (ref 3.4–5.3)
PROT SERPL-MCNC: 7.1 G/DL (ref 6.4–8.3)
RBC # BLD AUTO: 4.34 10E6/UL (ref 3.8–5.2)
RBC URINE: 4 /HPF
SODIUM SERPL-SCNC: 138 MMOL/L (ref 136–145)
SP GR UR STRIP: 1 (ref 1–1.03)
SQUAMOUS EPITHELIAL: 2 /HPF
UROBILINOGEN UR STRIP-MCNC: NORMAL MG/DL
WBC # BLD AUTO: 11.2 10E3/UL (ref 4–11)
WBC URINE: 7 /HPF

## 2022-11-11 PROCEDURE — 99285 EMERGENCY DEPT VISIT HI MDM: CPT | Mod: 25 | Performed by: FAMILY MEDICINE

## 2022-11-11 PROCEDURE — 250N000009 HC RX 250: Performed by: FAMILY MEDICINE

## 2022-11-11 PROCEDURE — 96361 HYDRATE IV INFUSION ADD-ON: CPT | Performed by: FAMILY MEDICINE

## 2022-11-11 PROCEDURE — 250N000011 HC RX IP 250 OP 636: Performed by: FAMILY MEDICINE

## 2022-11-11 PROCEDURE — 74177 CT ABD & PELVIS W/CONTRAST: CPT

## 2022-11-11 PROCEDURE — 36415 COLL VENOUS BLD VENIPUNCTURE: CPT | Performed by: FAMILY MEDICINE

## 2022-11-11 PROCEDURE — 82550 ASSAY OF CK (CPK): CPT | Performed by: FAMILY MEDICINE

## 2022-11-11 PROCEDURE — 96376 TX/PRO/DX INJ SAME DRUG ADON: CPT | Performed by: FAMILY MEDICINE

## 2022-11-11 PROCEDURE — 85025 COMPLETE CBC W/AUTO DIFF WBC: CPT | Performed by: FAMILY MEDICINE

## 2022-11-11 PROCEDURE — 80053 COMPREHEN METABOLIC PANEL: CPT | Performed by: FAMILY MEDICINE

## 2022-11-11 PROCEDURE — 258N000003 HC RX IP 258 OP 636: Performed by: FAMILY MEDICINE

## 2022-11-11 PROCEDURE — 86140 C-REACTIVE PROTEIN: CPT | Performed by: FAMILY MEDICINE

## 2022-11-11 PROCEDURE — 81001 URINALYSIS AUTO W/SCOPE: CPT | Performed by: FAMILY MEDICINE

## 2022-11-11 PROCEDURE — 93005 ELECTROCARDIOGRAM TRACING: CPT | Performed by: FAMILY MEDICINE

## 2022-11-11 PROCEDURE — 250N000013 HC RX MED GY IP 250 OP 250 PS 637: Performed by: FAMILY MEDICINE

## 2022-11-11 PROCEDURE — 93010 ELECTROCARDIOGRAM REPORT: CPT | Performed by: FAMILY MEDICINE

## 2022-11-11 PROCEDURE — 96374 THER/PROPH/DIAG INJ IV PUSH: CPT | Mod: 59 | Performed by: FAMILY MEDICINE

## 2022-11-11 PROCEDURE — 83690 ASSAY OF LIPASE: CPT | Performed by: FAMILY MEDICINE

## 2022-11-11 PROCEDURE — 83735 ASSAY OF MAGNESIUM: CPT | Performed by: FAMILY MEDICINE

## 2022-11-11 RX ORDER — ACETAMINOPHEN 500 MG
1000 TABLET ORAL ONCE
Status: COMPLETED | OUTPATIENT
Start: 2022-11-11 | End: 2022-11-11

## 2022-11-11 RX ORDER — IOPAMIDOL 755 MG/ML
90 INJECTION, SOLUTION INTRAVASCULAR ONCE
Status: COMPLETED | OUTPATIENT
Start: 2022-11-11 | End: 2022-11-11

## 2022-11-11 RX ORDER — SODIUM CHLORIDE 9 MG/ML
1000 INJECTION, SOLUTION INTRAVENOUS CONTINUOUS
Status: DISCONTINUED | OUTPATIENT
Start: 2022-11-11 | End: 2022-11-11 | Stop reason: HOSPADM

## 2022-11-11 RX ORDER — ONDANSETRON 2 MG/ML
4 INJECTION INTRAMUSCULAR; INTRAVENOUS EVERY 30 MIN PRN
Status: DISCONTINUED | OUTPATIENT
Start: 2022-11-11 | End: 2022-11-11 | Stop reason: HOSPADM

## 2022-11-11 RX ADMIN — SODIUM CHLORIDE 1000 ML: 9 INJECTION, SOLUTION INTRAVENOUS at 19:18

## 2022-11-11 RX ADMIN — IOPAMIDOL 90 ML: 755 INJECTION, SOLUTION INTRAVENOUS at 18:34

## 2022-11-11 RX ADMIN — ONDANSETRON 4 MG: 2 INJECTION INTRAMUSCULAR; INTRAVENOUS at 16:39

## 2022-11-11 RX ADMIN — ACETAMINOPHEN 1000 MG: 500 TABLET, FILM COATED ORAL at 17:25

## 2022-11-11 RX ADMIN — SODIUM CHLORIDE 69 ML: 9 INJECTION, SOLUTION INTRAVENOUS at 18:34

## 2022-11-11 RX ADMIN — ONDANSETRON 4 MG: 2 INJECTION INTRAMUSCULAR; INTRAVENOUS at 19:18

## 2022-11-11 RX ADMIN — SODIUM CHLORIDE 1000 ML: 9 INJECTION, SOLUTION INTRAVENOUS at 16:36

## 2022-11-11 ASSESSMENT — ENCOUNTER SYMPTOMS
BLOOD IN STOOL: 0
DIARRHEA: 0
PALPITATIONS: 0
DYSURIA: 0
CHILLS: 0
ABDOMINAL PAIN: 1
HEADACHES: 0
FEVER: 0
CONSTIPATION: 0
NAUSEA: 1
SHORTNESS OF BREATH: 0
SORE THROAT: 0
WHEEZING: 0
FREQUENCY: 0
SINUS PRESSURE: 0
DIAPHORESIS: 0
VOMITING: 0
COUGH: 0

## 2022-11-11 ASSESSMENT — ACTIVITIES OF DAILY LIVING (ADL)
ADLS_ACUITY_SCORE: 37

## 2022-11-11 NOTE — ED PROVIDER NOTES
History     Chief Complaint   Patient presents with     Nausea     Abdominal Pain     Generalized Body Aches     HPI  Doretha Yu is a 47 year old female who presents with a complicated past history diabetes, melanoma about 5 years ago with complications after her biologic agent treatments, prothrombin mutation, ulcerative colitis and secondary ostomy placement pending reevaluation to have this removed, history of drug-induced Cushing syndrome due to chronic corticosteroid use.  Has been recently on topiramate and phentermine for weight loss to allow for the ostomy surgery but wonders if the medication started a week ago may have been causing some of her current symptoms.  She tells me that she has had diffuse body aches abdominal cramping decreased ostomy output nausea without vomiting.  No associated fever.  She was planning to drive down to see her [facility] including her colorectal surgeons but she felt that she was too weak to continue driving and stopped at our facility here for further evaluation.  She denies fever.  There is been no significant cough.  The pain of the abdomen will radiate into the chest.  No significant dyspnea.  She is anxious about symptoms and frustrated and at times tearful when describing this.    Allergies:  Allergies   Allergen Reactions     Bee Venom Swelling     Azithromycin Diarrhea     Erythromycin      Other reaction(s): GI intolerance, Vomiting     Fentanyl Other (See Comments)     sweating  sweating     Prochlorperazine Fatigue     Other reaction(s): Other (see comments)  Fatigue     Buspirone      Other reaction(s): GI intolerance  vomiting     Erythrocin Nausea and Vomiting     Zithromax [Azithromycin Dihydrate] Diarrhea     Enbrel [Etanercept] Hives and Rash       Problem List:    Patient Active Problem List    Diagnosis Date Noted     Migraines      Priority: Medium     Colostomy in place (H) 07/16/2021     Priority: Medium     S/P colectomy 07/16/2021      Priority: Medium     Ulcerative colitis without complications, unspecified location (H) 06/03/2021     Priority: Medium     Dehydration 04/13/2021     Priority: Medium     Hematochezia 04/13/2021     Priority: Medium     Diarrhea of infectious origin 04/13/2021     Priority: Medium     C. difficile colitis 04/13/2021     Priority: Medium     Adjustment disorder with mixed emotional features 06/16/2020     Priority: Medium     Polyarthralgia 04/29/2020     Priority: Medium     Drug-induced polyneuropathy (H) 04/29/2020     Priority: Medium     Pain syndrome, chronic 02/12/2020     Priority: Medium     Drug-induced Cushing's syndrome (H) 02/12/2020     Priority: Medium     Diabetes mellitus, iatrogenic (H) 01/28/2020     Priority: Medium     High risk HPV infection 01/28/2020     Priority: Medium     Added automatically from request for surgery [id number]       Immunosuppression (H) 01/28/2020     Priority: Medium     Added automatically from request for surgery [id number]       STORMY (obstructive sleep apnea) 01/27/2020     Priority: Medium     1/2019 (241#)-AHI 24, lowest oxygen saturation was 86%, no periodic limb movement were noted, CPAP 8 cm/H20 was effective.       Secondary lymphedema 01/27/2020     Priority: Medium     Chronic neutrophilia 01/27/2020     Priority: Medium     Cervical high risk HPV (human papillomavirus) test positive 12/13/2019     Priority: Medium     2011 NIL pap.  2015 NIL pap, Neg HPV.  12/13/19 NIL pap , + HR HPV 16. Plan colp.   1/6/19 Failed colp exam secondary to anatomic constraints with gyn. Referred to gyn/onc.   2/25/20 Colpo bx and ECC Negative for dysplasia. Plan cotest in 1 year.   8/20/21 NIL pap, Neg HPV. Plan cotest in 1 year per provider.   9/28/22 Lost to follow-up for pap tracking        Immunosuppressed status (H) 09/05/2019     Priority: Medium     Ulcerative colitis with complication, unspecified location (H) 09/05/2019     Priority: Medium     Morbid obesity (H) 09/05/2019      Priority: Medium     Arthritis, rheumatoid (H) 11/26/2018     Priority: Medium     Hypocalcemia 05/15/2018     Priority: Medium     Anemia 05/14/2018     Priority: Medium     Menstrual irregularity 05/14/2018     Priority: Medium     Arthralgia of both knees 05/12/2018     Priority: Medium     Formatting of this note might be different from the original.  Work-up in progress. There is concern for inflammatory arthritis.       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     Priority: Medium     Malignant melanoma of left upper extremity including shoulder (H) 10/12/2017     Priority: Medium     Metastatic malignant melanoma (H) 10/10/2017     Priority: Medium     Prothrombin mutation (H) 04/05/2017     Priority: Medium     On daily aspirin 81 mg per hematology's recommendations from 2008       Vision changes 04/01/2017     Priority: Medium     Mild intermittent asthma without complication 11/08/2013     Priority: Medium     Moderate major depression (H) 06/24/2013     Priority: Medium     Anxiety state 09/13/2012     Priority: Medium     Problem list name updated by automated process. Provider to review       Esophageal reflux 09/13/2012     Priority: Medium     DUB (dysfunctional uterine bleeding) 07/28/2011     Priority: Medium     CARDIOVASCULAR SCREENING; LDL GOAL LESS THAN 160 07/28/2011     Priority: Low        Past Medical History:    Past Medical History:   Diagnosis Date      Abnormal MRI      Anxiety      Basal cell carcinoma      Cervical high risk HPV (human papillomavirus) test positive 2019     Colitis      Depression      Diabetes mellitus, iatrogenic (H) 2020     Esophageal reflux      Inflammatory arthritis      Insomnia      Intestinal giardiasis 2018     Lumbago      Lymphedema      Malignant melanoma (H)      Melanoma (H) 10/23/2017     Migraines      Mild persistent asthma      Morbid obesity with BMI of 40.0-44.9, adult (H)      STORMY (obstructive sleep apnea)      Prothrombin deficiency (H)      Stroke (cerebrum) (H)      TIA (transient ischemic attack)      Type 2 diabetes mellitus (H)        Past Surgical History:    Past Surgical History:   Procedure Laterality Date     APPENDECTOMY       COLONOSCOPY N/A 10/18/2017    Procedure: COLONOSCOPY;  Colon;  Surgeon: Debbie Stephens MD;  Location: [facility]     COLONOSCOPY N/A 2018    Procedure: COMBINED COLONOSCOPY, SINGLE OR MULTIPLE BIOPSY/POLYPECTOMY BY BIOPSY;  colon;  Surgeon: Benita Schumacher MD;  Location: [facility]     COLONOSCOPY      multiple since  to present - about 6 total     DISSECT LYMPH NODE AXILLA Left 10/23/2017    Procedure: DISSECT LYMPH NODE AXILLA;  Left Axillary Lymph Node Dissection ;  Surgeon: Laurent Cool MD;  Location: [facility]     EXAM UNDER ANESTHESIA PELVIC N/A 2020    Procedure: EXAM UNDER ANESTHESIA, PELVIS; with Cervical Biopsies, Vaginal Biopsy and Endocervical Curettings;  Surgeon: Melina Jung MD;  Location: [facility] OR     GYN SURGERY  ,          LAPAROSCOPIC ASSISTED COLECTOMY N/A 06/15/2021    Procedure: laparoscopic total abdominal colectomy, end ileostomy;  Surgeon: Quinton Ram MD;  Location: [facility]     REPAIR MOHS Left 2017    Procedure: REPAIR MOHS;  Left Upper Lid Moh's Reconstruction;  Surgeon: Kisha Bosch MD;  Location: [facility]       Family History:    Family History   Problem Relation Age of Onset     Cancer  Mother 45        lung     Neurologic Disorder Mother         epilepsy     Lipids Father      Gastrointestinal Disease Father         diverticulitis      Depression Father      Colitis Father      Colon Cancer Father      Diverticulitis Father      Cancer Maternal Grandmother      Blood Disease Maternal Grandmother         lymphoma      Arthritis Maternal Grandmother      Diabetes Maternal Grandmother      Depression Maternal Grandmother      Macular Degeneration Maternal Grandmother      Glaucoma Maternal Grandmother      Diabetes Maternal Grandfather      Cerebrovascular Disease Maternal Grandfather      Blood Disease Maternal Grandfather      Heart Disease Maternal Grandfather      Glaucoma Maternal Grandfather      Cancer Paternal Grandmother      Cancer - colorectal Paternal Grandmother      Colitis Paternal Grandmother      Colon Cancer Paternal Grandmother      Diverticulitis Paternal Grandmother      Respiratory Paternal Grandfather         emphysema      Colitis Paternal Grandfather      Colon Cancer Paternal Grandfather      Diverticulitis Paternal Grandfather      Heart Disease Daughter      Asthma Daughter      Depression Sister      Melanoma No family hx of        Social History:  Marital Status:   [4]  Social History     Tobacco Use     Smoking status: Former     Packs/day: 1.00     Years: 5.00     Pack years: 5.00     Types: Cigarettes     Quit date: 3/20/1998     Years since quittin.6     Smokeless tobacco: Never   Vaping Use     Vaping Use: Never used   Substance Use Topics     Alcohol use: Not Currently     Drug use: No        Medications:    acetaminophen (TYLENOL) 325 MG tablet  albuterol (PROAIR HFA/PROVENTIL HFA/VENTOLIN HFA) 108 (90 Base) MCG/ACT inhaler  Alcohol Swabs (ALCOHOL PREP) 70 % PADS  blood glucose (NO BRAND SPECIFIED) lancets standard  blood glucose (NO BRAND SPECIFIED) test strip  Calcium Carb-Cholecalciferol 600-800 MG-UNIT TABS  calcium carbonate (TUMS) 500 MG  "chewable tablet  cyanocobalamin (VITAMIN B-12) 1000 MCG tablet  doxycycline hyclate (VIBRAMYCIN) 100 MG capsule  gabapentin (NEURONTIN) 600 MG tablet  guaiFENesin-codeine (ROBITUSSIN AC) 100-10 MG/5ML solution  hydrOXYzine (ATARAX) 25 MG tablet  Lidocaine (LIDOCARE) 4 % Patch  loperamide (IMODIUM A-D) 2 MG tablet  loperamide (IMODIUM A-D) 2 MG tablet  medical cannabis (Patient's own supply)  metFORMIN (GLUCOPHAGE) 500 MG tablet  methocarbamol (ROBAXIN) 500 MG tablet  Ostomy Supplies MISC  pantoprazole (PROTONIX) 40 MG EC tablet  predniSONE (DELTASONE) 1 MG tablet  simethicone (MYLICON) 80 MG chewable tablet  venlafaxine (EFFEXOR) 75 MG tablet  VITAMIN D3 25 MCG (1000 UT) tablet  VITRON-C  MG TABS tablet          Review of Systems   Constitutional: Negative for chills, diaphoresis and fever.   HENT: Negative for ear pain, sinus pressure and sore throat.    Eyes: Negative for visual disturbance.   Respiratory: Negative for cough, shortness of breath and wheezing.    Cardiovascular: Negative for chest pain and palpitations.   Gastrointestinal: Positive for abdominal pain and nausea. Negative for blood in stool, constipation, diarrhea and vomiting.   Genitourinary: Negative for dysuria, frequency and urgency.   Skin: Negative for rash.   Neurological: Negative for headaches.   All other systems reviewed and are negative.      Physical Exam   BP: 117/79  Pulse: 87  Temp: 97.2  F (36.2  C)  Resp: 16  Height: 160 cm (5' 3\")  Weight: 106.6 kg (235 lb) (stated)  SpO2: 98 %      Physical Exam  Constitutional:       General: She is in acute distress.      Appearance: She is not diaphoretic.   HENT:      Head: Atraumatic.   Eyes:      Conjunctiva/sclera: Conjunctivae normal.   Cardiovascular:      Rate and Rhythm: Normal rate and regular rhythm.      Heart sounds: No murmur heard.  Pulmonary:      Effort: Pulmonary effort is normal. No respiratory distress.      Breath sounds: Normal breath sounds. No stridor. No wheezing " or rhonchi.   Abdominal:      General: Abdomen is flat. Bowel sounds are normal. There is no distension.      Tenderness: There is abdominal tenderness. There is no right CVA tenderness, left CVA tenderness or guarding.   Musculoskeletal:      Cervical back: Neck supple.      Right lower leg: No edema.      Left lower leg: No edema.   Skin:     Coloration: Skin is not pale.      Findings: No rash.   Neurological:      General: No focal deficit present.      Mental Status: She is alert.      Motor: No weakness.         ED Course                 Procedures                EKG Interpretation:      Interpreted by Cezar Bryna MD  EKG done at 1527 hrs. demonstrates a sinus rhythm with a normal axis.  There is no ST change.  T wave inversion in lead V2.  There is a poor R progression.  No Q waves.  There are normal intervals.  Normal conduction.  No ectopy.  Impression sinus rhythm 77 bpm with poor R progression possible prior anterior septal MI.  No acute significant change other than isolated T wave inversion V2        Critical Care time:  none               Results for orders placed or performed during the hospital encounter of 11/11/22 (from the past 24 hour(s))   Comprehensive metabolic panel   Result Value Ref Range    Sodium 138 136 - 145 mmol/L    Potassium 4.3 3.4 - 5.3 mmol/L    Chloride 104 98 - 107 mmol/L    Carbon Dioxide (CO2) 21 (L) 22 - 29 mmol/L    Anion Gap 13 7 - 15 mmol/L    Urea Nitrogen 11.5 6.0 - 20.0 mg/dL    Creatinine 0.82 0.51 - 0.95 mg/dL    Calcium 9.4 8.6 - 10.0 mg/dL    Glucose 83 70 - 99 mg/dL    Alkaline Phosphatase 94 35 - 104 U/L    AST 37 (H) 10 - 35 U/L    ALT 36 (H) 10 - 35 U/L    Protein Total 7.1 6.4 - 8.3 g/dL    Albumin 4.2 3.5 - 5.2 g/dL    Bilirubin Total 0.2 <=1.2 mg/dL    GFR Estimate 89 >60 mL/min/1.73m2   Lipase   Result Value Ref Range    Lipase 15 13 - 60 U/L   CK total   Result Value Ref Range    CK 94 26 - 192 U/L   CRP Inflammation   Result Value Ref Range    CRP  Inflammation 14.16 (H) <5.00 mg/L   Magnesium   Result Value Ref Range    Magnesium 2.0 1.7 - 2.3 mg/dL   CBC with platelets differential    Narrative    The following orders were created for panel order CBC with platelets differential.  Procedure                               Abnormality         Status                     ---------                               -----------         ------                     CBC with platelets and d...[[id number]]  Abnormal            Final result                 Please view results for these tests on the individual orders.   CBC with platelets and differential   Result Value Ref Range    WBC Count 11.2 (H) 4.0 - 11.0 10e3/uL    RBC Count 4.34 3.80 - 5.20 10e6/uL    Hemoglobin 12.2 11.7 - 15.7 g/dL    Hematocrit 37.6 35.0 - 47.0 %    MCV 87 78 - 100 fL    MCH 28.1 26.5 - 33.0 pg    MCHC 32.4 31.5 - 36.5 g/dL    RDW 13.8 10.0 - 15.0 %    Platelet Count 328 150 - 450 10e3/uL    % Neutrophils 65 %    % Lymphocytes 25 %    % Monocytes 7 %    % Eosinophils 1 %    % Basophils 1 %    % Immature Granulocytes 1 %    NRBCs per 100 WBC 0 <1 /100    Absolute Neutrophils 7.3 1.6 - 8.3 10e3/uL    Absolute Lymphocytes 2.8 0.8 - 5.3 10e3/uL    Absolute Monocytes 0.8 0.0 - 1.3 10e3/uL    Absolute Eosinophils 0.2 0.0 - 0.7 10e3/uL    Absolute Basophils 0.1 0.0 - 0.2 10e3/uL    Absolute Immature Granulocytes 0.1 <=0.4 10e3/uL    Absolute NRBCs 0.0 10e3/uL   CT Abdomen Pelvis w Contrast    Narrative    EXAM: CT ABDOMEN PELVIS W CONTRAST  LOCATION: [facility]  DATE/TIME: 11/11/2022 6:43 PM    INDICATION: abd pain, distention, decreased ostomy output   eval for SBO  COMPARISON: 08/14/2022 and older studies.  TECHNIQUE: CT scan of the abdomen and pelvis was performed following injection of IV contrast. Multiplanar reformats were obtained. Dose reduction techniques were used.  CONTRAST: 90 mL Isovue 370    FINDINGS:   LOWER CHEST: Minimal subpleural atelectasis.    HEPATOBILIARY:  Diffuse fatty infiltration of the liver. Subtle CLEMENTE (transient hepatic attenuation difference) in the right lobe is unchanged.    PANCREAS: Completely atrophied except for the head and uncinate process.    SPLEEN: Normal.    ADRENAL GLANDS: Normal.    KIDNEYS/BLADDER: Normal.    BOWEL: Subtotal colectomy with the right upper quadrant ostomy and a long Mejía's pouch. Parastomal hernia again noted containing only mesentery. Bowel is of normal caliber. No inflammatory changes or ascites.    LYMPH NODES: Normal.    VASCULATURE: Unremarkable.    PELVIC ORGANS: Normal.    MUSCULOSKELETAL: There is a tiny umbilical hernia containing only fat.      Impression    IMPRESSION:   1.  No abnormalities are seen to explain symptoms. Specifically, no bowel obstruction or inflammatory changes.  2.  Stable, postoperative changes from subtotal colectomy with a long Mejía's pouch, right upper quadrant diverting ileostomy and parastomal hernia containing only fat.  3.  Nearly completely atrophied pancreas.   UA with Microscopic reflex to Culture    Specimen: Urine, Clean Catch   Result Value Ref Range    Color Urine Yellow Colorless, Straw, Light Yellow, Yellow    Appearance Urine Clear Clear    Glucose Urine Negative Negative mg/dL    Bilirubin Urine Negative Negative    Ketones Urine Negative Negative mg/dL    Specific Gravity Urine 1.005 1.003 - 1.035    Blood Urine Negative Negative    pH Urine 5.5 5.0 - 7.0    Protein Albumin Urine Negative Negative mg/dL    Urobilinogen Urine Normal Normal, 2.0 mg/dL    Nitrite Urine Negative Negative    Leukocyte Esterase Urine Negative Negative    Bacteria Urine Few (A) None Seen /HPF    Mucus Urine Present (A) None Seen /LPF    RBC Urine 4 (H) <=2 /HPF    WBC Urine 7 (H) <=5 /HPF    Squamous Epithelials Urine 2 (H) <=1 /HPF    Narrative    Urine Culture not indicated     *Note: Due to a large number of results and/or encounters for the requested time period, some results have not been  displayed. A complete set of results can be found in Results Review.       Medications   0.9% sodium chloride BOLUS (has no administration in time range)   sodium chloride 0.9% infusion (has no administration in time range)   ondansetron (ZOFRAN) injection 4 mg (has no administration in time range)   iopamidol (ISOVUE-370) solution 90 mL (has no administration in time range)   sodium chloride 0.9 % bag 500mL for CT scan flush use (has no administration in time range)       Assessments & Plan (with Medical Decision Making)     MDM: Doretha Yu is a 47 year old female presents with a history of prior ostomy placement complicated prior history of inflammatory bowel disease, Cushing's disease due to prolonged use of corticosteroids who presents with a generalized sense of weakness malaise, nausea abdominal pain decreased ostomy output.  Plan from a broad-based evaluation.  Electrolytes including potassium level magnesium, obtain a CK and a CRP.    Findings are reassuring today.  Electrolytes are normal.  CK normal.  Imaging reassuring.  Treated symptomatically.  I do suspect this is related to her prednisone taper and her new medications and we discussed empiric management as below with precautions for return and following up with her primary provider and other specialist next week.      I have reviewed the nursing notes.    I have reviewed the findings, diagnosis, plan and need for follow up with the patient.       New Prescriptions    No medications on file       Final diagnoses:   Generalized muscle weakness - no serious findings.  maintain hydration at home.follow-up this coming week.  I do not think this is the Tirzeptatide based on my review.  I think this may be the prednisone taper.  increase the prednisone to 4 mg over the next 3-4 days and then back down to 3 mg and discuss with your providers.  hold the topiramate/phentermine until symptoms luan.        11/11/2022   [facility] EMERGENCY  DEPT     Cezar Bryan MD  11/11/22 0436

## 2022-11-12 NOTE — DISCHARGE INSTRUCTIONS
ICD-10-CM    1. Generalized muscle weakness  M62.81     no serious findings.  maintain hydration at home.follow-up this coming week.  I do not think this is the Tirzeptatide based on my review.  I think this may be the prednisone taper.  increase the prednisone to 4 mg over the next 3-4 days and then back down to 3 mg and discuss with your providers.  hold the topiramate/phentermine until symptoms luan.

## 2022-11-21 ENCOUNTER — HEALTH MAINTENANCE LETTER (OUTPATIENT)
Age: 47
End: 2022-11-21

## 2022-11-22 NOTE — TELEPHONE ENCOUNTER
Pt injured ankle on 5/24/20 and was seen in the ER.  Pt was sent home with air splint, elevate, Ice, Oxycodone and Tylenol.  Pt calling in excruciating pain, crying.  Recommended pt return to ER for increased, pain from ankle injury.  Pt agreed.  Lauren  
Reason for call:  Patient reporting a symptom    Symptom or request: Pt was seen in ER for ankle injury 5/24 - No fracture and seeing Ortho tomorrow. Pt was given #10 Oxycodone and pt also takes tramadol for arthritis.  Pt was given crutches and air splint.  Pt also c/o arthritis pain because she is tapering off of Prednisone.  Pt wants something stronger for pain.    Pt is sobbing and hard to understand.  Transferred call to Clinic RN as high priority    Duration (how long have symptoms been present): ongoing    Have you been treated for this before? Yes    Additional comments:     Phone Number patient can be reached at:  Home number on file [phone] (home)    Best Time:  any    Can we leave a detailed message on this number:  YES    Call taken on 5/26/2020 at 2:45 PM by Jena Quintero    
No

## 2022-11-28 ENCOUNTER — OFFICE VISIT (OUTPATIENT)
Dept: URGENT CARE | Facility: URGENT CARE | Age: 47
End: 2022-11-28
Payer: COMMERCIAL

## 2022-11-28 VITALS
HEART RATE: 97 BPM | DIASTOLIC BLOOD PRESSURE: 81 MMHG | SYSTOLIC BLOOD PRESSURE: 117 MMHG | BODY MASS INDEX: 40.39 KG/M2 | TEMPERATURE: 98 F | OXYGEN SATURATION: 98 % | WEIGHT: 228 LBS

## 2022-11-28 DIAGNOSIS — H65.93 BILATERAL NON-SUPPURATIVE OTITIS MEDIA: Primary | ICD-10-CM

## 2022-11-28 PROCEDURE — 99213 OFFICE O/P EST LOW 20 MIN: CPT | Performed by: FAMILY MEDICINE

## 2022-11-28 RX ORDER — TIRZEPATIDE 2.5 MG/.5ML
2.5 INJECTION, SOLUTION SUBCUTANEOUS
Status: ON HOLD | COMMUNITY
Start: 2022-10-26 | End: 2023-01-12

## 2022-11-28 RX ORDER — ONDANSETRON 8 MG/1
8 TABLET, ORALLY DISINTEGRATING ORAL
COMMUNITY
Start: 2022-11-10 | End: 2022-12-10

## 2022-11-28 NOTE — PROGRESS NOTES
SUBJECTIVE:   Doretha Yu is a 47 year old female presenting with a chief complaint of fever, cough - non-productive and ear pain bilateral.  Onset of symptoms was 10 day(s) ago.  Course of illness is waxing and waning.    Severity moderate  Current and Associated symptoms:   Treatment measures tried include Tylenol/Ibuprofen.  Predisposing factors include home .    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      Anxiety      Basal cell carcinoma      Cervical high risk HPV (human papillomavirus) test positive 2019    See problem list     Colitis      Depression      Diabetes mellitus, iatrogenic (H) 2020     Esophageal reflux      Inflammatory arthritis      Insomnia      Intestinal giardiasis 2018     Lumbago     left lower back pain     Lymphedema      Malignant melanoma (H)      Melanoma (H) 10/23/2017     Migraines      Mild persistent asthma      Morbid obesity with BMI of 40.0-44.9, adult (H)      STORMY (obstructive sleep apnea)      Prothrombin deficiency (H)     takes 81mg asa daily     Stroke (cerebrum) (H)     During      TIA (transient ischemic attack)      Type 2 diabetes mellitus (H)      Current Outpatient Medications   Medication Sig Dispense Refill     acetaminophen (TYLENOL) 325 MG tablet Take 3 tablets (975 mg) by mouth every 8 hours As scheduled for the next 7-10 days, then decrease both dose & frequency to as needed there after. 90 tablet 0     albuterol (PROAIR HFA/PROVENTIL HFA/VENTOLIN HFA) 108 (90 Base) MCG/ACT inhaler Inhale 2 puffs into the lungs every 4 hours as needed for shortness of breath / dyspnea 18 g 1     Calcium Carb-Cholecalciferol 600-800 MG-UNIT TABS Take 800 mg by mouth every morning (before breakfast) 90 tablet 3     calcium carbonate (TUMS) 500 MG chewable tablet Take 1 tablet (500 mg) by mouth 3 times daily as needed for heartburn 30 tablet 0     Cholecalciferol  (VITAMIN D3) 25 MCG TABS TAKE THREE TABLETS BY MOUTH ONCE DAILY 300 tablet 1     cyanocobalamin (VITAMIN B-12) 1000 MCG tablet Take 1 tablet (1,000 mcg) by mouth daily       gabapentin (NEURONTIN) 600 MG tablet TAKE ONE TABLET (600MG) BY MOUTH FOUR TIMES A  tablet 5     hydrOXYzine (ATARAX) 25 MG tablet Take 1 tablet (25 mg) by mouth every 6 hours as needed for anxiety 40 tablet 0     medical cannabis (Patient's own supply) See Admin Instructions (The purpose of this order is to document that the patient reports taking medical cannabis.  This is not a prescription, and is not used to certify that the patient has a qualifying medical condition.)       metFORMIN (GLUCOPHAGE) 500 MG tablet Take 2 tablets (1,000 mg) by mouth 2 times daily (with meals) ** VISIT DUE/LAST FILL ** 360 tablet 0     ondansetron (ZOFRAN ODT) 8 MG ODT tab Take 8 mg by mouth       Ostomy Supplies MISC 20 each daily 20 each 11     pantoprazole (PROTONIX) 40 MG EC tablet TAKE 1 TABLET BY MOUTH EVERY MORNING BEFORE BREAKFAST 90 tablet 0     predniSONE (DELTASONE) 1 MG tablet Take 2 tablets (2 mg) by mouth daily Take 10 mg daily. Taper 1 mg every 2 weeks 30 tablet 0     Tirzepatide (MOUNJARO) 2.5 MG/0.5ML SOPN Inject 2.5 mg Subcutaneous       venlafaxine (EFFEXOR) 75 MG tablet Take 2 tablets (150 mg) by mouth 2 times daily 120 tablet 5     Alcohol Swabs (ALCOHOL PREP) 70 % PADS 1 applicator 3 times daily (Patient not taking: No sig reported) 100 each 0     blood glucose (NO BRAND SPECIFIED) lancets standard Use to test blood sugar 3 times daily or as directed. (Patient not taking: No sig reported) 1 each 0     blood glucose (NO BRAND SPECIFIED) test strip Use to test blood sugar 3 times daily or as directed. (Patient not taking: No sig reported) 200 strip 0     doxycycline hyclate (VIBRAMYCIN) 100 MG capsule Take 1 capsule (100 mg) by mouth 2 times daily (Patient not taking: Reported on 11/28/2022) 20 capsule 0     guaiFENesin-codeine  (ROBITUSSIN AC) 100-10 MG/5ML solution Take 5-10 mLs by mouth every 4 hours as needed for cough (Patient not taking: Reported on 2022) 120 mL 0     Lidocaine (LIDOCARE) 4 % Patch Place 1 patch onto the skin every 24 hours To prevent lidocaine toxicity, patient should be patch free for 12 hrs daily. (Patient not taking: Reported on 2022) 14 patch 0     loperamide (IMODIUM A-D) 2 MG tablet Take 2 tabs daily and increase as needed until output is apple sauce consistency. Max of 8 tabs (16 mg) per day. (Patient not taking: Reported on 2022) 240 tablet 3     loperamide (IMODIUM A-D) 2 MG tablet Take 2 tablets (4 mg) by mouth 4 times daily as needed for diarrhea (Patient not taking: Reported on 2022) 2 tablet 3     methocarbamol (ROBAXIN) 500 MG tablet Take 2 tablets (1,000 mg) by mouth 3 times daily as needed for muscle spasms (Patient not taking: Reported on 2022) 30 tablet 1     simethicone (MYLICON) 80 MG chewable tablet Take 1-2 tablets ( mg) by mouth 4 times daily as needed for cramping (Patient not taking: Reported on 2022) 120 tablet 0     VITRON-C  MG TABS tablet Take 1 tablet by mouth daily (Patient not taking: Reported on 2022) 30 tablet 1     Social History     Tobacco Use     Smoking status: Former     Packs/day: 1.00     Years: 5.00     Pack years: 5.00     Types: Cigarettes     Quit date: 3/20/1998     Years since quittin.7     Smokeless tobacco: Never   Substance Use Topics     Alcohol use: Not Currently   hasn't been checking blood sugar   She has been benefitting from this     ROS:  Review of systems negative except as stated above.    OBJECTIVE:  /81   Pulse 97   Temp 98  F (36.7  C) (Tympanic)   Wt 103.4 kg (228 lb)   SpO2 98%   BMI 40.39 kg/m    GENERAL APPEARANCE: healthy, alert and no distress  EYES: EOMI,  PERRL, conjunctiva clear  HENT: ear canals and TM's normal.  Nose and mouth without ulcers, erythema or lesions  NECK: supple,  nontender, no lymphadenopathy  RESP: lungs clear to auscultation - no rales, rhonchi or wheezes    ASSESSMENT:  1. Bilateral non-suppurative otitis media    - amoxicillin-clavulanate (AUGMENTIN) 875-125 MG tablet; Take 1 tablet by mouth 2 times daily for 10 days  Dispense: 20 tablet; Refill: 0    Swapna Gipson M.D.

## 2022-12-06 ENCOUNTER — MYC MEDICAL ADVICE (OUTPATIENT)
Dept: SURGERY | Facility: CLINIC | Age: 47
End: 2022-12-06

## 2022-12-06 ENCOUNTER — TELEPHONE (OUTPATIENT)
Dept: FAMILY MEDICINE | Facility: CLINIC | Age: 47
End: 2022-12-06

## 2022-12-06 NOTE — TELEPHONE ENCOUNTER
"Patient returned call. She ws seen in urgent care 11/28/22. She has not fever, no ear drainage. It is painful. Has 2 days of antibiotics left and states, \"nothing has changed. She is nauseated when she lays down. Tylenol and ibuprofen ineffective. Advised to follow up if no improvement in 1 week. Scheduled with PCP for tomorrow.    Britany FAM RN    "

## 2022-12-06 NOTE — TELEPHONE ENCOUNTER
Reason for call:  Patient reporting a symptom    Symptom or request: Pt was seen in  11/28 and was given Rx for an ear infection.  Pt continues to have ear pain, fullness, dizziness and nausea.  Please call patient and advise.      Duration (how long have symptoms been present): ongoing    Have you been treated for this before? Yes    Additional comments:     Phone Number patient can be reached at:  Home number on file [phone] (home)    Best Time:  any    Can we leave a detailed message on this number:  YES    Call taken on 12/6/2022 at 8:15 AM by Jena Tony

## 2022-12-06 NOTE — PROGRESS NOTES
"Colon and Rectal Surgery Video Clinic Note    RE: Doretha Yu.  : 1975.  JUNIOR: 2022.    Doretha Yu is a 47 year old female who is being evaluated via a billable video visit.      The patient has been notified of following:     \"This video visit will be conducted via a call between you and your physician/provider. We have found that certain health care needs can be provided without the need for an in-person physical exam.  This service lets us provide the care you need with a video conversation.  If a prescription is necessary we can send it directly to your pharmacy.  If lab work is needed we can place an order for that and you can then stop by our lab to have the test done at a later time.    Video visits are billed at different rates depending on your insurance coverage.  Please reach out to your insurance provider with any questions.    If during the course of the call the physician/provider feels a video visit is not appropriate, you will not be charged for this service.\"    Patient has given verbal consent for Video visit? Yes    Video Start Time: 12:15      Reason for visit: follow up    HPI:  Doretha Fernandez is a 46 year old female with ulcerative colitis. She presents today for a follow up to discuss proctectomy and J pouch.  On 6/15/2021 I took her to the OR for a TAC with end ileostomy (laparoscopic). She does currently have a parastomal hernia. Last time I saw Doretha I continued to encourage weight loss and titrating off steroids as I will not be able to create a pouch with Doretha on prednisone. Her upper GI on 10/4/2022 showed mild chronic inflammation in the gastric antrum and LA grade A esophagitis with no bleeding in the gastroesophageal junction.     She has not yet completed her MRE.    Interval History: She is currently on 1 mg of prednisone. She follows with medical weight loss clinic at [place]. Her weight loss has been 27 lbs in 2 months.  She thinks her medication has made her very " "nauseous and she says she is only eating 1000 calories per day.  She also says that she has had the flu recently, and that her most recent drop to 1 mg every day of prednisone has left her feeling very tired and emotional.  She notes emotional lability with every change in her steroid dose.  She is teary today and states that she is tired of feeling ill.  She says that she is \"chewing through zofran\" and that the medicinal marijuana is the only thing helpful to her at this time.  She says this helps immensely with pain control.      Upper GI (10/4/2022):  Findings:        LA Grade A (one or more mucosal breaks less than 5 mm, not extending        between tops of 2 mucosal folds) esophagitis with no bleeding was found        at the gastroesophageal junction.        Localized mild inflammation was found in the gastric antrum. Biopsies        were taken with a cold forceps for histology. No evidence for recurrent        melanoma.        The examined duodenum was normal.       Surgical Pathology (10/4/2022):   FINAL DIAGNOSIS   A.  Stomach, antrum, body, endoscopic biopsy:  Fundic-type   mucosa with mild chronic inflammation and focal active   inflammation.  Antral mucosa with mild reactive epithelial   change.  No intestinal metaplasia.  An immunohistochemical   stain for Helicobacter pylori is pending and will be   reported in an addendum       Medications:  Current Outpatient Medications   Medication Sig Dispense Refill     acetaminophen (TYLENOL) 325 MG tablet Take 3 tablets (975 mg) by mouth every 8 hours As scheduled for the next 7-10 days, then decrease both dose & frequency to as needed there after. 90 tablet 0     albuterol (PROAIR HFA/PROVENTIL HFA/VENTOLIN HFA) 108 (90 Base) MCG/ACT inhaler Inhale 2 puffs into the lungs every 4 hours as needed for shortness of breath / dyspnea 18 g 1     Alcohol Swabs (ALCOHOL PREP) 70 % PADS 1 applicator 3 times daily (Patient not taking: No sig reported) 100 each 0     " amoxicillin-clavulanate (AUGMENTIN) 875-125 MG tablet Take 1 tablet by mouth 2 times daily for 10 days 20 tablet 0     blood glucose (NO BRAND SPECIFIED) lancets standard Use to test blood sugar 3 times daily or as directed. (Patient not taking: No sig reported) 1 each 0     blood glucose (NO BRAND SPECIFIED) test strip Use to test blood sugar 3 times daily or as directed. (Patient not taking: No sig reported) 200 strip 0     Calcium Carb-Cholecalciferol 600-800 MG-UNIT TABS Take 800 mg by mouth every morning (before breakfast) 90 tablet 3     calcium carbonate (TUMS) 500 MG chewable tablet Take 1 tablet (500 mg) by mouth 3 times daily as needed for heartburn 30 tablet 0     Cholecalciferol (VITAMIN D3) 25 MCG TABS TAKE THREE TABLETS BY MOUTH ONCE DAILY 300 tablet 1     cyanocobalamin (VITAMIN B-12) 1000 MCG tablet Take 1 tablet (1,000 mcg) by mouth daily       doxycycline hyclate (VIBRAMYCIN) 100 MG capsule Take 1 capsule (100 mg) by mouth 2 times daily (Patient not taking: Reported on 11/28/2022) 20 capsule 0     gabapentin (NEURONTIN) 600 MG tablet TAKE ONE TABLET (600MG) BY MOUTH FOUR TIMES A  tablet 5     guaiFENesin-codeine (ROBITUSSIN AC) 100-10 MG/5ML solution Take 5-10 mLs by mouth every 4 hours as needed for cough (Patient not taking: Reported on 11/28/2022) 120 mL 0     hydrOXYzine (ATARAX) 25 MG tablet Take 1 tablet (25 mg) by mouth every 6 hours as needed for anxiety 40 tablet 0     Lidocaine (LIDOCARE) 4 % Patch Place 1 patch onto the skin every 24 hours To prevent lidocaine toxicity, patient should be patch free for 12 hrs daily. (Patient not taking: Reported on 11/28/2022) 14 patch 0     loperamide (IMODIUM A-D) 2 MG tablet Take 2 tabs daily and increase as needed until output is apple sauce consistency. Max of 8 tabs (16 mg) per day. (Patient not taking: Reported on 9/20/2022) 240 tablet 3     loperamide (IMODIUM A-D) 2 MG tablet Take 2 tablets (4 mg) by mouth 4 times daily as needed for  diarrhea (Patient not taking: Reported on 9/20/2022) 2 tablet 3     medical cannabis (Patient's own supply) See Admin Instructions (The purpose of this order is to document that the patient reports taking medical cannabis.  This is not a prescription, and is not used to certify that the patient has a qualifying medical condition.)       metFORMIN (GLUCOPHAGE) 500 MG tablet Take 2 tablets (1,000 mg) by mouth 2 times daily (with meals) ** VISIT DUE/LAST FILL ** 360 tablet 0     methocarbamol (ROBAXIN) 500 MG tablet Take 2 tablets (1,000 mg) by mouth 3 times daily as needed for muscle spasms (Patient not taking: Reported on 9/20/2022) 30 tablet 1     ondansetron (ZOFRAN ODT) 8 MG ODT tab Take 8 mg by mouth       Ostomy Supplies MISC 20 each daily 20 each 11     pantoprazole (PROTONIX) 40 MG EC tablet TAKE 1 TABLET BY MOUTH EVERY MORNING BEFORE BREAKFAST 90 tablet 0     predniSONE (DELTASONE) 1 MG tablet Take 2 tablets (2 mg) by mouth daily Take 10 mg daily. Taper 1 mg every 2 weeks 30 tablet 0     simethicone (MYLICON) 80 MG chewable tablet Take 1-2 tablets ( mg) by mouth 4 times daily as needed for cramping (Patient not taking: Reported on 9/20/2022) 120 tablet 0     Tirzepatide (MOUNJARO) 2.5 MG/0.5ML SOPN Inject 2.5 mg Subcutaneous       venlafaxine (EFFEXOR) 75 MG tablet Take 2 tablets (150 mg) by mouth 2 times daily 120 tablet 5     VITRON-C  MG TABS tablet Take 1 tablet by mouth daily (Patient not taking: Reported on 9/20/2022) 30 tablet 1       ROS:  A complete review of systems was performed with the patient and all systems negative except as per HPI.    Physical Examination:  She is tearful throughout the majority of our video visit.    ASSESSMENT  48 y/o lady with UC, s/p TAC + EI, now awaiting J pouch, currently still obese and on steroids, but making big improvements.     Risks, benefits, and alternatives of operative treatment were thoroughly discussed with the patient, he/she understands these  well and agrees to proceed.    PLAN  1. Continue weight loss - advised her the greater the weight loss the more likely she will have a positive outcome from surgery; she will call her medical weight loss clinic to let them know about her constant nausea; she has an appointment in January but will try and get one earlier  2. Continue steroid taper; I encouraged her that she has made great progress and am very hopeful that she will no longer be on prednisone at our next visit  3. Follow up with me (video OK) in 2 months to reassess  4. She still needs the MRE      Video-Visit Details    Type of service:  Video Visit    Video End Time (time video stopped): 12:46    Originating Location (pt. Location): Home    Distant Location (provider location):  [facility] [place]     Mode of Communication:  Video Conference via [employer]    45 minutes spent on the date of the encounter doing chart review, history, review of imaging, documentation ,and further activities as noted above, which includes my time spent on video with the patient/family.       Quinton Ram MD, PhD    Division of Colon and Rectal Surgery  [facility]    Referring Provider:  Referred Self, MD  No address on file     Primary Care Provider:  Anna Naidu

## 2022-12-07 ENCOUNTER — OFFICE VISIT (OUTPATIENT)
Dept: FAMILY MEDICINE | Facility: CLINIC | Age: 47
End: 2022-12-07
Payer: COMMERCIAL

## 2022-12-07 VITALS
RESPIRATION RATE: 16 BRPM | OXYGEN SATURATION: 99 % | DIASTOLIC BLOOD PRESSURE: 70 MMHG | TEMPERATURE: 98.4 F | HEART RATE: 87 BPM | SYSTOLIC BLOOD PRESSURE: 112 MMHG

## 2022-12-07 DIAGNOSIS — F32.0 MILD MAJOR DEPRESSION (H): ICD-10-CM

## 2022-12-07 DIAGNOSIS — E13.9 DIABETES MELLITUS, IATROGENIC (H): ICD-10-CM

## 2022-12-07 DIAGNOSIS — F41.1 ANXIETY STATE: ICD-10-CM

## 2022-12-07 DIAGNOSIS — D68.52 PROTHROMBIN MUTATION (H): ICD-10-CM

## 2022-12-07 DIAGNOSIS — H92.02 OTALGIA, LEFT: Primary | ICD-10-CM

## 2022-12-07 LAB — HBA1C MFR BLD: 6.6 % (ref 0–5.6)

## 2022-12-07 PROCEDURE — 99214 OFFICE O/P EST MOD 30 MIN: CPT | Performed by: FAMILY MEDICINE

## 2022-12-07 PROCEDURE — 36415 COLL VENOUS BLD VENIPUNCTURE: CPT | Performed by: FAMILY MEDICINE

## 2022-12-07 PROCEDURE — 83036 HEMOGLOBIN GLYCOSYLATED A1C: CPT | Performed by: FAMILY MEDICINE

## 2022-12-07 ASSESSMENT — ASTHMA QUESTIONNAIRES
QUESTION_1 LAST FOUR WEEKS HOW MUCH OF THE TIME DID YOUR ASTHMA KEEP YOU FROM GETTING AS MUCH DONE AT WORK, SCHOOL OR AT HOME: NONE OF THE TIME
QUESTION_2 LAST FOUR WEEKS HOW OFTEN HAVE YOU HAD SHORTNESS OF BREATH: ONCE OR TWICE A WEEK
QUESTION_4 LAST FOUR WEEKS HOW OFTEN HAVE YOU USED YOUR RESCUE INHALER OR NEBULIZER MEDICATION (SUCH AS ALBUTEROL): NOT AT ALL
ACT_TOTALSCORE: 24
QUESTION_5 LAST FOUR WEEKS HOW WOULD YOU RATE YOUR ASTHMA CONTROL: COMPLETELY CONTROLLED
QUESTION_3 LAST FOUR WEEKS HOW OFTEN DID YOUR ASTHMA SYMPTOMS (WHEEZING, COUGHING, SHORTNESS OF BREATH, CHEST TIGHTNESS OR PAIN) WAKE YOU UP AT NIGHT OR EARLIER THAN USUAL IN THE MORNING: NOT AT ALL
ACT_TOTALSCORE: 24

## 2022-12-07 ASSESSMENT — PAIN SCALES - GENERAL: PAINLEVEL: SEVERE PAIN (6)

## 2022-12-07 ASSESSMENT — PATIENT HEALTH QUESTIONNAIRE - PHQ9
SUM OF ALL RESPONSES TO PHQ QUESTIONS 1-9: 12
10. IF YOU CHECKED OFF ANY PROBLEMS, HOW DIFFICULT HAVE THESE PROBLEMS MADE IT FOR YOU TO DO YOUR WORK, TAKE CARE OF THINGS AT HOME, OR GET ALONG WITH OTHER PEOPLE: SOMEWHAT DIFFICULT
SUM OF ALL RESPONSES TO PHQ QUESTIONS 1-9: 12

## 2022-12-07 NOTE — PROGRESS NOTES
Assessment & Plan       (H92.02) Otalgia, left  (primary encounter diagnosis)  Comment: suspect some residual fluid (ETD)  Plan: with the nausea, I think she's done enough on the Augmentin at this time and could stop  Afrin then flonase and auto-insufflation     (E13.9) Diabetes mellitus, iatrogenic (H)  Comment:  Check HgbA1c today  Down to 2 mg prednisone  Plan: HEMOGLOBIN A1C        Doing well with weight loss on the Tizepatide injections    (D68.52) Prothrombin mutation (H)  Comment:    Plan:  At risk for clotting with surgery        No follow-ups on file.    Anna Naidu MD  [facility]   Doretha is a 47 year old, presenting for the following health issues:  Ear Problem (States she has had ear pain/infection for over 2 weeks.  States antibiotics aren't really helping & she is still dizzy and having pain from it. )      History of Present Illness       Reason for visit:  Ear    She eats 2-3 servings of fruits and vegetables daily.She consumes 0 sweetened beverage(s) daily.She exercises with enough effort to increase her heart rate 10 to 19 minutes per day.  She exercises with enough effort to increase her heart rate 3 or less days per week.   She is taking medications regularly.    Today's PHQ-9         PHQ-9 Total Score: 12    PHQ-9 Q9 Thoughts of better off dead/self-harm past 2 weeks :   Not at all    How difficult have these problems made it for you to do your work, take care of things at home, or get along with other people: Somewhat difficult     Has decreased pain just today.  Has been on augmentin for about a week.    Ear still feels full/congested.         Review of Systems   Constitutional, HEENT, cardiovascular, pulmonary, gi and gu systems are negative, except as otherwise noted.      Objective    /70   Pulse 87   Temp 98.4  F (36.9  C) (Tympanic)   Resp 16   LMP 12/07/2022 (Exact Date)   SpO2 99%   There is no height or weight on file to  calculate BMI.  Physical Exam   GENERAL: healthy, alert and no distress  HENT: normal cephalic/atraumatic, right ear: normal: no effusions, no erythema, normal landmarks, left ear: clear effusion, nose and mouth without ulcers or lesions, oropharynx clear and oral mucous membranes moist      Results for orders placed or performed in visit on 12/07/22   HEMOGLOBIN A1C     Status: Abnormal   Result Value Ref Range    Hemoglobin A1C 6.6 (H) 0.0 - 5.6 %

## 2022-12-08 RX ORDER — VENLAFAXINE 75 MG/1
150 TABLET ORAL 2 TIMES DAILY
Qty: 120 TABLET | Refills: 3 | Status: SHIPPED | OUTPATIENT
Start: 2022-12-08 | End: 2023-03-14

## 2022-12-13 ENCOUNTER — VIRTUAL VISIT (OUTPATIENT)
Dept: SURGERY | Facility: CLINIC | Age: 47
End: 2022-12-13
Payer: COMMERCIAL

## 2022-12-13 DIAGNOSIS — K51.90 ULCERATIVE COLITIS WITHOUT COMPLICATIONS, UNSPECIFIED LOCATION (H): Primary | ICD-10-CM

## 2022-12-13 PROCEDURE — 99215 OFFICE O/P EST HI 40 MIN: CPT | Mod: 95 | Performed by: SURGERY

## 2022-12-13 NOTE — LETTER
"2022       RE: Doretha uY  [id number]  [facility] [address]     Dear Colleague,    Thank you for referring your patient, Doretha Yu, to the [facility] [place] at [facility]. Please see a copy of my visit note below.    Colon and Rectal Surgery Video Clinic Note    RE: Doretha Yu.  : 1975.  JUNIOR: 2022.    Reason for visit: follow up    HPI:  Doretha Fernandez is a 46 year old female with ulcerative colitis. She presents today for a follow up to discuss proctectomy and J pouch.  On 6/15/2021 I took her to the OR for a TAC with end ileostomy (laparoscopic). She does currently have a parastomal hernia. Last time I saw Doretha I continued to encourage weight loss and titrating off steroids as I will not be able to create a pouch with Doretha on prednisone. Her upper GI on 10/4/2022 showed mild chronic inflammation in the gastric antrum and LA grade A esophagitis with no bleeding in the gastroesophageal junction.     She has not yet completed her MRE.    Interval History: She is currently on 1 mg of prednisone. She follows with medical weight loss clinic at [place]. Her weight loss has been 27 lbs in 2 months.  She thinks her medication has made her very nauseous and she says she is only eating 1000 calories per day.  She also says that she has had the flu recently, and that her most recent drop to 1 mg every day of prednisone has left her feeling very tired and emotional.  She notes emotional lability with every change in her steroid dose.  She is teary today and states that she is tired of feeling ill.  She says that she is \"chewing through zofran\" and that the medicinal marijuana is the only thing helpful to her at this time.  She says this helps immensely with pain control.      Upper GI (10/4/2022):  Findings:        LA Grade A (one or more mucosal breaks less than 5 mm, not extending        between tops of " 2 mucosal folds) esophagitis with no bleeding was found        at the gastroesophageal junction.        Localized mild inflammation was found in the gastric antrum. Biopsies        were taken with a cold forceps for histology. No evidence for recurrent        melanoma.        The examined duodenum was normal.       Surgical Pathology (10/4/2022):   FINAL DIAGNOSIS   A.  Stomach, antrum, body, endoscopic biopsy:  Fundic-type   mucosa with mild chronic inflammation and focal active   inflammation.  Antral mucosa with mild reactive epithelial   change.  No intestinal metaplasia.  An immunohistochemical   stain for Helicobacter pylori is pending and will be   reported in an addendum       Medications:  Current Outpatient Medications   Medication Sig Dispense Refill     acetaminophen (TYLENOL) 325 MG tablet Take 3 tablets (975 mg) by mouth every 8 hours As scheduled for the next 7-10 days, then decrease both dose & frequency to as needed there after. 90 tablet 0     albuterol (PROAIR HFA/PROVENTIL HFA/VENTOLIN HFA) 108 (90 Base) MCG/ACT inhaler Inhale 2 puffs into the lungs every 4 hours as needed for shortness of breath / dyspnea 18 g 1     Alcohol Swabs (ALCOHOL PREP) 70 % PADS 1 applicator 3 times daily (Patient not taking: No sig reported) 100 each 0     amoxicillin-clavulanate (AUGMENTIN) 875-125 MG tablet Take 1 tablet by mouth 2 times daily for 10 days 20 tablet 0     blood glucose (NO BRAND SPECIFIED) lancets standard Use to test blood sugar 3 times daily or as directed. (Patient not taking: No sig reported) 1 each 0     blood glucose (NO BRAND SPECIFIED) test strip Use to test blood sugar 3 times daily or as directed. (Patient not taking: No sig reported) 200 strip 0     Calcium Carb-Cholecalciferol 600-800 MG-UNIT TABS Take 800 mg by mouth every morning (before breakfast) 90 tablet 3     calcium carbonate (TUMS) 500 MG chewable tablet Take 1 tablet (500 mg) by mouth 3 times daily as needed for heartburn 30  tablet 0     Cholecalciferol (VITAMIN D3) 25 MCG TABS TAKE THREE TABLETS BY MOUTH ONCE DAILY 300 tablet 1     cyanocobalamin (VITAMIN B-12) 1000 MCG tablet Take 1 tablet (1,000 mcg) by mouth daily       doxycycline hyclate (VIBRAMYCIN) 100 MG capsule Take 1 capsule (100 mg) by mouth 2 times daily (Patient not taking: Reported on 11/28/2022) 20 capsule 0     gabapentin (NEURONTIN) 600 MG tablet TAKE ONE TABLET (600MG) BY MOUTH FOUR TIMES A  tablet 5     guaiFENesin-codeine (ROBITUSSIN AC) 100-10 MG/5ML solution Take 5-10 mLs by mouth every 4 hours as needed for cough (Patient not taking: Reported on 11/28/2022) 120 mL 0     hydrOXYzine (ATARAX) 25 MG tablet Take 1 tablet (25 mg) by mouth every 6 hours as needed for anxiety 40 tablet 0     Lidocaine (LIDOCARE) 4 % Patch Place 1 patch onto the skin every 24 hours To prevent lidocaine toxicity, patient should be patch free for 12 hrs daily. (Patient not taking: Reported on 11/28/2022) 14 patch 0     loperamide (IMODIUM A-D) 2 MG tablet Take 2 tabs daily and increase as needed until output is apple sauce consistency. Max of 8 tabs (16 mg) per day. (Patient not taking: Reported on 9/20/2022) 240 tablet 3     loperamide (IMODIUM A-D) 2 MG tablet Take 2 tablets (4 mg) by mouth 4 times daily as needed for diarrhea (Patient not taking: Reported on 9/20/2022) 2 tablet 3     medical cannabis (Patient's own supply) See Admin Instructions (The purpose of this order is to document that the patient reports taking medical cannabis.  This is not a prescription, and is not used to certify that the patient has a qualifying medical condition.)       metFORMIN (GLUCOPHAGE) 500 MG tablet Take 2 tablets (1,000 mg) by mouth 2 times daily (with meals) ** VISIT DUE/LAST FILL ** 360 tablet 0     methocarbamol (ROBAXIN) 500 MG tablet Take 2 tablets (1,000 mg) by mouth 3 times daily as needed for muscle spasms (Patient not taking: Reported on 9/20/2022) 30 tablet 1     ondansetron (ZOFRAN  ODT) 8 MG ODT tab Take 8 mg by mouth       Ostomy Supplies MISC 20 each daily 20 each 11     pantoprazole (PROTONIX) 40 MG EC tablet TAKE 1 TABLET BY MOUTH EVERY MORNING BEFORE BREAKFAST 90 tablet 0     predniSONE (DELTASONE) 1 MG tablet Take 2 tablets (2 mg) by mouth daily Take 10 mg daily. Taper 1 mg every 2 weeks 30 tablet 0     simethicone (MYLICON) 80 MG chewable tablet Take 1-2 tablets ( mg) by mouth 4 times daily as needed for cramping (Patient not taking: Reported on 9/20/2022) 120 tablet 0     Tirzepatide (MOUNJARO) 2.5 MG/0.5ML SOPN Inject 2.5 mg Subcutaneous       venlafaxine (EFFEXOR) 75 MG tablet Take 2 tablets (150 mg) by mouth 2 times daily 120 tablet 5     VITRON-C  MG TABS tablet Take 1 tablet by mouth daily (Patient not taking: Reported on 9/20/2022) 30 tablet 1       ROS:  A complete review of systems was performed with the patient and all systems negative except as per HPI.    Physical Examination:  She is tearful throughout the majority of our video visit.    ASSESSMENT  48 y/o lady with UC, s/p TAC + EI, now awaiting J pouch, currently still obese and on steroids, but making big improvements.     Risks, benefits, and alternatives of operative treatment were thoroughly discussed with the patient, he/she understands these well and agrees to proceed.    PLAN  1. Continue weight loss - advised her the greater the weight loss the more likely she will have a positive outcome from surgery; she will call her medical weight loss clinic to let them know about her constant nausea; she has an appointment in January but will try and get one earlier  2. Continue steroid taper; I encouraged her that she has made great progress and am very hopeful that she will no longer be on prednisone at our next visit  3. Follow up with me (video OK) in 2 months to reassess  4. She still needs the MRE      Quinton Ram MD, PhD    Division of Colon and Rectal Surgery  [place]  [facility]    Referring Provider:  Referred Self, MD  No address on file     Primary Care Provider:  Anna Naidu

## 2022-12-18 NOTE — DISCHARGE INSTRUCTIONS
Increase fluids, rest, elevate, tylenol and ibuprofen over the counter as long as no allergies or contraindications.     Use medication as directed for break through pain; do not drive or operate machinery within 8 hours of taking it.     Crutches and Cam walking boot as needed.  Follow-up with orthopedic specialist for recheck and further evaluation if symptoms persist.      Patient to return to the emergency department if pain out of proportion, numbness, discoloration to the skin, or change in symptoms occur       negative

## 2022-12-23 ENCOUNTER — NURSE TRIAGE (OUTPATIENT)
Dept: NURSING | Facility: CLINIC | Age: 47
End: 2022-12-23

## 2022-12-23 NOTE — TELEPHONE ENCOUNTER
"Doretha reports having \"Extreme stomach pain\" for a few hours    - Severe for more than an hour  - Both Rt & Lt abdomen; Lt radiates to back  - Started on Lt side  - Still has stool coming from her stoma  - Vomited x1 - \"looked like what's coming out of my stoma\"    She has a history of Ulcerative Colitis and no longer has a colon    ER advised    Shanika Sin RN  [facility]      Reason for Disposition    [1] SEVERE pain (e.g., excruciating) AND [2] present > 1 hour    Additional Information    Negative: Shock suspected (e.g., cold/pale/clammy skin, too weak to stand, low BP, rapid pulse)    Negative: Difficult to awaken or acting confused (e.g., disoriented, slurred speech)    Negative: Passed out (i.e., lost consciousness, collapsed and was not responding)    Negative: Sounds like a life-threatening emergency to the triager    Protocols used: ABDOMINAL PAIN - FEMALE-A-AH      "

## 2022-12-27 ENCOUNTER — HOSPITAL ENCOUNTER (OUTPATIENT)
Dept: MRI IMAGING | Facility: CLINIC | Age: 47
Discharge: HOME | End: 2022-12-27
Attending: SURGERY | Admitting: SURGERY
Payer: COMMERCIAL

## 2022-12-27 DIAGNOSIS — K51.919 ULCERATIVE COLITIS WITH COMPLICATION, UNSPECIFIED LOCATION (H): ICD-10-CM

## 2022-12-27 PROCEDURE — 72197 MRI PELVIS W/O & W/DYE: CPT

## 2022-12-27 PROCEDURE — 255N000002 HC RX 255 OP 636: Performed by: SURGERY

## 2022-12-27 PROCEDURE — A9585 GADOBUTROL INJECTION: HCPCS | Performed by: SURGERY

## 2022-12-27 PROCEDURE — 74183 MRI ABD W/O CNTR FLWD CNTR: CPT

## 2022-12-27 PROCEDURE — 250N000011 HC RX IP 250 OP 636: Performed by: SURGERY

## 2022-12-27 PROCEDURE — 258N000003 HC RX IP 258 OP 636: Performed by: SURGERY

## 2022-12-27 RX ORDER — GADOBUTROL 604.72 MG/ML
10 INJECTION INTRAVENOUS ONCE
Status: COMPLETED | OUTPATIENT
Start: 2022-12-27 | End: 2022-12-27

## 2022-12-27 RX ADMIN — GLUCAGON 1 MG: KIT at 11:35

## 2022-12-27 RX ADMIN — SODIUM CHLORIDE 50 ML: 9 INJECTION, SOLUTION INTRAVENOUS at 12:36

## 2022-12-27 RX ADMIN — GADOBUTROL 10 ML: 604.72 INJECTION INTRAVENOUS at 12:35

## 2023-01-10 ENCOUNTER — APPOINTMENT (OUTPATIENT)
Dept: NUCLEAR MEDICINE | Facility: CLINIC | Age: 48
End: 2023-01-10
Attending: FAMILY MEDICINE
Payer: COMMERCIAL

## 2023-01-10 ENCOUNTER — APPOINTMENT (OUTPATIENT)
Dept: ULTRASOUND IMAGING | Facility: CLINIC | Age: 48
End: 2023-01-10
Attending: FAMILY MEDICINE
Payer: COMMERCIAL

## 2023-01-10 ENCOUNTER — APPOINTMENT (OUTPATIENT)
Dept: CT IMAGING | Facility: CLINIC | Age: 48
End: 2023-01-10
Attending: FAMILY MEDICINE
Payer: COMMERCIAL

## 2023-01-10 ENCOUNTER — HOSPITAL ENCOUNTER (EMERGENCY)
Facility: CLINIC | Age: 48
Discharge: HOME | End: 2023-01-11
Attending: FAMILY MEDICINE | Admitting: FAMILY MEDICINE
Payer: COMMERCIAL

## 2023-01-10 DIAGNOSIS — R10.11 RUQ ABDOMINAL PAIN: ICD-10-CM

## 2023-01-10 DIAGNOSIS — K82.8 BILIARY DYSKINESIA: ICD-10-CM

## 2023-01-10 DIAGNOSIS — R07.9 ACUTE CHEST PAIN: ICD-10-CM

## 2023-01-10 LAB
ALBUMIN SERPL BCG-MCNC: 3.8 G/DL (ref 3.5–5.2)
ALP SERPL-CCNC: 89 U/L (ref 35–104)
ALT SERPL W P-5'-P-CCNC: 33 U/L (ref 10–35)
ANION GAP SERPL CALCULATED.3IONS-SCNC: 12 MMOL/L (ref 7–15)
AST SERPL W P-5'-P-CCNC: 25 U/L (ref 10–35)
BASOPHILS # BLD AUTO: 0.1 10E3/UL (ref 0–0.2)
BASOPHILS NFR BLD AUTO: 1 %
BILIRUB DIRECT SERPL-MCNC: <0.2 MG/DL (ref 0–0.3)
BILIRUB SERPL-MCNC: <0.2 MG/DL
BUN SERPL-MCNC: 10.5 MG/DL (ref 6–20)
CALCIUM SERPL-MCNC: 9.6 MG/DL (ref 8.6–10)
CHLORIDE SERPL-SCNC: 104 MMOL/L (ref 98–107)
CREAT SERPL-MCNC: 0.68 MG/DL (ref 0.51–0.95)
DEPRECATED HCO3 PLAS-SCNC: 24 MMOL/L (ref 22–29)
EOSINOPHIL # BLD AUTO: 0.4 10E3/UL (ref 0–0.7)
EOSINOPHIL NFR BLD AUTO: 4 %
ERYTHROCYTE [DISTWIDTH] IN BLOOD BY AUTOMATED COUNT: 14.7 % (ref 10–15)
GFR SERPL CREATININE-BSD FRML MDRD: >90 ML/MIN/1.73M2
GLUCOSE SERPL-MCNC: 118 MG/DL (ref 70–99)
HCT VFR BLD AUTO: 36.3 % (ref 35–47)
HGB BLD-MCNC: 11.9 G/DL (ref 11.7–15.7)
HOLD SPECIMEN: NORMAL
HOLD SPECIMEN: NORMAL
IMM GRANULOCYTES # BLD: 0.1 10E3/UL
IMM GRANULOCYTES NFR BLD: 1 %
LIPASE SERPL-CCNC: 21 U/L (ref 13–60)
LYMPHOCYTES # BLD AUTO: 2.4 10E3/UL (ref 0.8–5.3)
LYMPHOCYTES NFR BLD AUTO: 24 %
MCH RBC QN AUTO: 27.9 PG (ref 26.5–33)
MCHC RBC AUTO-ENTMCNC: 32.8 G/DL (ref 31.5–36.5)
MCV RBC AUTO: 85 FL (ref 78–100)
MONOCYTES # BLD AUTO: 0.6 10E3/UL (ref 0–1.3)
MONOCYTES NFR BLD AUTO: 6 %
NEUTROPHILS # BLD AUTO: 6.6 10E3/UL (ref 1.6–8.3)
NEUTROPHILS NFR BLD AUTO: 64 %
NRBC # BLD AUTO: 0 10E3/UL
NRBC BLD AUTO-RTO: 0 /100
PLATELET # BLD AUTO: 392 10E3/UL (ref 150–450)
POTASSIUM SERPL-SCNC: 4.3 MMOL/L (ref 3.4–5.3)
PROT SERPL-MCNC: 6.9 G/DL (ref 6.4–8.3)
RBC # BLD AUTO: 4.27 10E6/UL (ref 3.8–5.2)
SODIUM SERPL-SCNC: 140 MMOL/L (ref 136–145)
TROPONIN T SERPL HS-MCNC: 6 NG/L
WBC # BLD AUTO: 10.2 10E3/UL (ref 4–11)

## 2023-01-10 PROCEDURE — 84484 ASSAY OF TROPONIN QUANT: CPT | Performed by: FAMILY MEDICINE

## 2023-01-10 PROCEDURE — 93010 ELECTROCARDIOGRAM REPORT: CPT | Performed by: EMERGENCY MEDICINE

## 2023-01-10 PROCEDURE — 85025 COMPLETE CBC W/AUTO DIFF WBC: CPT | Performed by: FAMILY MEDICINE

## 2023-01-10 PROCEDURE — 250N000011 HC RX IP 250 OP 636: Performed by: FAMILY MEDICINE

## 2023-01-10 PROCEDURE — 80053 COMPREHEN METABOLIC PANEL: CPT | Performed by: FAMILY MEDICINE

## 2023-01-10 PROCEDURE — 250N000009 HC RX 250: Performed by: FAMILY MEDICINE

## 2023-01-10 PROCEDURE — 74177 CT ABD & PELVIS W/CONTRAST: CPT

## 2023-01-10 PROCEDURE — 250N000011 HC RX IP 250 OP 636: Performed by: EMERGENCY MEDICINE

## 2023-01-10 PROCEDURE — 96374 THER/PROPH/DIAG INJ IV PUSH: CPT | Mod: 59 | Performed by: EMERGENCY MEDICINE

## 2023-01-10 PROCEDURE — 99285 EMERGENCY DEPT VISIT HI MDM: CPT | Mod: 25 | Performed by: EMERGENCY MEDICINE

## 2023-01-10 PROCEDURE — 258N000003 HC RX IP 258 OP 636: Performed by: EMERGENCY MEDICINE

## 2023-01-10 PROCEDURE — 36415 COLL VENOUS BLD VENIPUNCTURE: CPT | Performed by: FAMILY MEDICINE

## 2023-01-10 PROCEDURE — 76705 ECHO EXAM OF ABDOMEN: CPT

## 2023-01-10 PROCEDURE — 83690 ASSAY OF LIPASE: CPT | Performed by: FAMILY MEDICINE

## 2023-01-10 PROCEDURE — 78227 HEPATOBIL SYST IMAGE W/DRUG: CPT

## 2023-01-10 PROCEDURE — 96376 TX/PRO/DX INJ SAME DRUG ADON: CPT | Performed by: EMERGENCY MEDICINE

## 2023-01-10 PROCEDURE — A9537 TC99M MEBROFENIN: HCPCS | Performed by: EMERGENCY MEDICINE

## 2023-01-10 PROCEDURE — 93005 ELECTROCARDIOGRAM TRACING: CPT | Performed by: EMERGENCY MEDICINE

## 2023-01-10 PROCEDURE — 343N000001 HC RX 343: Performed by: EMERGENCY MEDICINE

## 2023-01-10 PROCEDURE — 96375 TX/PRO/DX INJ NEW DRUG ADDON: CPT | Performed by: EMERGENCY MEDICINE

## 2023-01-10 PROCEDURE — 82248 BILIRUBIN DIRECT: CPT | Performed by: FAMILY MEDICINE

## 2023-01-10 RX ORDER — ONDANSETRON 2 MG/ML
4 INJECTION INTRAMUSCULAR; INTRAVENOUS ONCE
Status: COMPLETED | OUTPATIENT
Start: 2023-01-10 | End: 2023-01-10

## 2023-01-10 RX ORDER — KIT FOR THE PREPARATION OF TECHNETIUM TC 99M MEBROFENIN 45 MG/10ML
6.8 INJECTION, POWDER, LYOPHILIZED, FOR SOLUTION INTRAVENOUS ONCE
Status: COMPLETED | OUTPATIENT
Start: 2023-01-10 | End: 2023-01-10

## 2023-01-10 RX ORDER — IOPAMIDOL 755 MG/ML
89 INJECTION, SOLUTION INTRAVASCULAR ONCE
Status: COMPLETED | OUTPATIENT
Start: 2023-01-10 | End: 2023-01-10

## 2023-01-10 RX ORDER — HYDROMORPHONE HYDROCHLORIDE 1 MG/ML
0.5 INJECTION, SOLUTION INTRAMUSCULAR; INTRAVENOUS; SUBCUTANEOUS
Status: DISCONTINUED | OUTPATIENT
Start: 2023-01-10 | End: 2023-01-11 | Stop reason: HOSPADM

## 2023-01-10 RX ORDER — KETOROLAC TROMETHAMINE 15 MG/ML
15 INJECTION, SOLUTION INTRAMUSCULAR; INTRAVENOUS ONCE
Status: COMPLETED | OUTPATIENT
Start: 2023-01-10 | End: 2023-01-10

## 2023-01-10 RX ADMIN — ONDANSETRON 4 MG: 2 INJECTION INTRAMUSCULAR; INTRAVENOUS at 22:48

## 2023-01-10 RX ADMIN — HYDROMORPHONE HYDROCHLORIDE 0.5 MG: 1 INJECTION, SOLUTION INTRAMUSCULAR; INTRAVENOUS; SUBCUTANEOUS at 22:33

## 2023-01-10 RX ADMIN — MEBROFENIN 6.8 MILLICURIE: 45 INJECTION, POWDER, LYOPHILIZED, FOR SOLUTION INTRAVENOUS at 20:20

## 2023-01-10 RX ADMIN — KETOROLAC TROMETHAMINE 15 MG: 15 INJECTION, SOLUTION INTRAMUSCULAR; INTRAVENOUS at 18:38

## 2023-01-10 RX ADMIN — HYDROMORPHONE HYDROCHLORIDE 0.5 MG: 1 INJECTION, SOLUTION INTRAMUSCULAR; INTRAVENOUS; SUBCUTANEOUS at 14:28

## 2023-01-10 RX ADMIN — SODIUM CHLORIDE 100 ML: 9 INJECTION, SOLUTION INTRAVENOUS at 12:09

## 2023-01-10 RX ADMIN — FAMOTIDINE 20 MG: 10 INJECTION INTRAVENOUS at 22:30

## 2023-01-10 RX ADMIN — ONDANSETRON 4 MG: 2 INJECTION INTRAMUSCULAR; INTRAVENOUS at 14:27

## 2023-01-10 RX ADMIN — IOPAMIDOL 89 ML: 755 INJECTION, SOLUTION INTRAVENOUS at 12:08

## 2023-01-10 RX ADMIN — HYDROMORPHONE HYDROCHLORIDE 1 MG: 1 INJECTION, SOLUTION INTRAMUSCULAR; INTRAVENOUS; SUBCUTANEOUS at 11:51

## 2023-01-10 RX ADMIN — SODIUM CHLORIDE 2 MCG: 9 INJECTION INTRAMUSCULAR; INTRAVENOUS; SUBCUTANEOUS at 21:16

## 2023-01-10 ASSESSMENT — ACTIVITIES OF DAILY LIVING (ADL)
ADLS_ACUITY_SCORE: 37

## 2023-01-10 NOTE — ED PROVIDER NOTES
"  History     Chief Complaint   Patient presents with     Chest Pain     HPI     Doretha Yu is a 47 year old female who resents with chest pain.  She said this came on about 5 PM yesterday when she got home from a walk with her dog.  It is in the central chest and she describes it as heavy and painful and hurting.  It caused her to sleep poorly during the night and began to radiate into the interscapular region and the night.  It hurts to take a deep breath.  She tried taking simethicone for it with no relief.  She has not had a fever or cough.  She is felt like her neck glands have been swollen for the last few days.  She has had a 28 pound weight loss in the past 6 weeks.  She was put on 2 medications for weight loss.  1 was a weekly injection, probably Victoza.  She does not know what the other one was.  She is attempting to lose weight in order to allow her to have her ileostomy taken down.  She has a parastomal hernia.  She had to stop the weight loss medications because they were causing too much nausea.  She still has her gallbladder.  She was diagnosed with malignant melanoma last year with a single axillary lymph node involved after lymphadenectomy.  She was treated with nivolumab resulting in colitis and proceeding to colectomy.  She is currently still on prednisone 1 mg every other day.    I reviewed the records of her recent visit with colorectal surgery on December 13.  She was seen for follow-up of ulcerative colitis and has had a prior \"TAC\" with end ileostomy and is being considered for distal proctectomy and J-pouch which is delayed due to being on steroid therapy.    I reviewed of the records of her 2 most recent visits for malignant melanoma with axillary metastases as well as her PET scan result from September showing no evidence of recurrent disease.    Allergies:  Allergies   Allergen Reactions     Bee Venom Swelling     Azithromycin Diarrhea     Erythromycin      Other reaction(s): GI " intolerance, Vomiting     Fentanyl Other (See Comments)     sweating  sweating     Prochlorperazine Fatigue     Other reaction(s): Other (see comments)  Fatigue     Buspirone      Other reaction(s): GI intolerance  vomiting     Erythrocin Nausea and Vomiting     Topamax [Topiramate]      Made her lethargic     Zithromax [Azithromycin Dihydrate] Diarrhea     Enbrel [Etanercept] Hives and Rash       Problem List:    Patient Active Problem List    Diagnosis Date Noted     Migraines      Priority: Medium     Colostomy in place (H) 07/16/2021     Priority: Medium     S/P colectomy 07/16/2021     Priority: Medium     Ulcerative colitis without complications, unspecified location (H) 06/03/2021     Priority: Medium     Dehydration 04/13/2021     Priority: Medium     Hematochezia 04/13/2021     Priority: Medium     Diarrhea of infectious origin 04/13/2021     Priority: Medium     C. difficile colitis 04/13/2021     Priority: Medium     Adjustment disorder with mixed emotional features 06/16/2020     Priority: Medium     Polyarthralgia 04/29/2020     Priority: Medium     Drug-induced polyneuropathy (H) 04/29/2020     Priority: Medium     Pain syndrome, chronic 02/12/2020     Priority: Medium     Drug-induced Cushing's syndrome (H) 02/12/2020     Priority: Medium     Diabetes mellitus, iatrogenic (H) 01/28/2020     Priority: Medium     High risk HPV infection 01/28/2020     Priority: Medium     Added automatically from request for surgery [id number]       Immunosuppression (H) 01/28/2020     Priority: Medium     Added automatically from request for surgery [id number]       STORMY (obstructive sleep apnea) 01/27/2020     Priority: Medium     1/2019 (241#)-AHI 24, lowest oxygen saturation was 86%, no periodic limb movement were noted, CPAP 8 cm/H20 was effective.       Secondary lymphedema 01/27/2020     Priority: Medium     Chronic neutrophilia 01/27/2020     Priority: Medium     Cervical high risk HPV (human papillomavirus) test  positive 12/13/2019     Priority: Medium     2011 NIL pap.  2015 NIL pap, Neg HPV.  12/13/19 NIL pap , + HR HPV 16. Plan colp.   1/6/19 Failed colp exam secondary to anatomic constraints with gyn. Referred to gyn/onc.   2/25/20 Colpo bx and ECC Negative for dysplasia. Plan cotest in 1 year.   8/20/21 NIL pap, Neg HPV. Plan cotest in 1 year per provider.   9/28/22 Lost to follow-up for pap tracking        Immunosuppressed status (H) 09/05/2019     Priority: Medium     Ulcerative colitis with complication, unspecified location (H) 09/05/2019     Priority: Medium     Morbid obesity (H) 09/05/2019     Priority: Medium     Arthritis, rheumatoid (H) 11/26/2018     Priority: Medium     Hypocalcemia 05/15/2018     Priority: Medium     Anemia 05/14/2018     Priority: Medium     Menstrual irregularity 05/14/2018     Priority: Medium     Arthralgia of both knees 05/12/2018     Priority: Medium     Formatting of this note might be different from the original.  Work-up in progress. There is concern for inflammatory arthritis.       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     Priority: Medium     Malignant melanoma of left upper extremity including shoulder (H) 10/12/2017     Priority: Medium     Metastatic malignant melanoma (H) 10/10/2017     Priority: Medium     Prothrombin mutation (H) 04/05/2017     Priority: Medium     On daily aspirin 81 mg per hematology's recommendations from 2008        Vision changes 04/01/2017     Priority: Medium     Mild intermittent asthma without complication 11/08/2013     Priority: Medium     Moderate major depression (H) 06/24/2013     Priority: Medium     Anxiety state 09/13/2012     Priority: Medium     Problem list name updated by automated process. Provider to review       Esophageal reflux 09/13/2012     Priority: Medium     DUB (dysfunctional uterine bleeding) 07/28/2011     Priority: Medium     CARDIOVASCULAR SCREENING; LDL GOAL LESS THAN 160 07/28/2011     Priority: Low        Past Medical History:    Past Medical History:   Diagnosis Date     Abnormal MRI      Anxiety      Basal cell carcinoma      Cervical high risk HPV (human papillomavirus) test positive 12/13/2019     Colitis      Depression      Diabetes mellitus, iatrogenic (H) 01/28/2020     Esophageal reflux      Inflammatory arthritis      Insomnia      Intestinal giardiasis 03/05/2018     Lumbago      Lymphedema      Malignant melanoma (H)      Melanoma (H) 10/23/2017     Migraines      Mild persistent asthma      Morbid obesity with BMI of 40.0-44.9, adult (H)      STORMY (obstructive sleep apnea)      Prothrombin deficiency (H)      Stroke (cerebrum) (H) 2004     TIA (transient ischemic attack) 2004     Type 2 diabetes mellitus (H)        Past Surgical History:    Past Surgical History:   Procedure Laterality Date     APPENDECTOMY  2004     COLONOSCOPY N/A 10/18/2017    Procedure: COLONOSCOPY;  Colon;  Surgeon: Debbie Stephens MD;  Location: [facility]     COLONOSCOPY N/A 03/09/2018    Procedure: COMBINED COLONOSCOPY, SINGLE OR MULTIPLE BIOPSY/POLYPECTOMY BY BIOPSY;  colon;  Surgeon: Benita Schumacher MD;  Location: [facility]     COLONOSCOPY      multiple since 2018 to present - about 6 total     DISSECT LYMPH NODE AXILLA Left 10/23/2017    Procedure: DISSECT LYMPH NODE AXILLA;  Left Axillary Lymph Node Dissection ;  Surgeon: Laurent Cool MD;  Location: [facility] OR     EXAM UNDER ANESTHESIA PELVIC N/A 02/25/2020     Procedure: EXAM UNDER ANESTHESIA, PELVIS; with Cervical Biopsies, Vaginal Biopsy and Endocervical Curettings;  Surgeon: Melina Jung MD;  Location: [facility] OR     GYN SURGERY  ,          LAPAROSCOPIC ASSISTED COLECTOMY N/A 06/15/2021    Procedure: laparoscopic total abdominal colectomy, end ileostomy;  Surgeon: Quinton Ram MD;  Location: [facility] OR     REPAIR MOHS Left 2017    Procedure: REPAIR MOHS;  Left Upper Lid Moh's Reconstruction;  Surgeon: Kisha Bosch MD;  Location: [facility] OR       Family History:    Family History   Problem Relation Age of Onset     Cancer Mother 45        lung     Neurologic Disorder Mother         epilepsy     Lipids Father      Gastrointestinal Disease Father         diverticulitis      Depression Father      Colitis Father      Colon Cancer Father      Diverticulitis Father      Cancer Maternal Grandmother      Blood Disease Maternal Grandmother         lymphoma      Arthritis Maternal Grandmother      Diabetes Maternal Grandmother      Depression Maternal Grandmother      Macular Degeneration Maternal Grandmother      Glaucoma Maternal Grandmother      Diabetes Maternal Grandfather      Cerebrovascular Disease Maternal Grandfather      Blood Disease Maternal Grandfather      Heart Disease Maternal Grandfather      Glaucoma Maternal Grandfather      Cancer Paternal Grandmother      Cancer - colorectal Paternal Grandmother      Colitis Paternal Grandmother      Colon Cancer Paternal Grandmother      Diverticulitis Paternal Grandmother      Respiratory Paternal Grandfather         emphysema      Colitis Paternal Grandfather      Colon Cancer Paternal Grandfather      Diverticulitis Paternal Grandfather      Heart Disease Daughter      Asthma Daughter      Depression Sister      Melanoma No family hx of        Social History:  Marital Status:   [4]  Social History     Tobacco Use     Smoking status: Former     Packs/day: 1.00     Years: 5.00      "Pack years: 5.00     Types: Cigarettes     Quit date: 3/20/1998     Years since quittin.8     Smokeless tobacco: Never   Vaping Use     Vaping Use: Never used   Substance Use Topics     Alcohol use: Not Currently     Drug use: No        Medications:    acetaminophen (TYLENOL) 325 MG tablet  albuterol (PROAIR HFA/PROVENTIL HFA/VENTOLIN HFA) 108 (90 Base) MCG/ACT inhaler  Calcium Carb-Cholecalciferol 600-800 MG-UNIT TABS  calcium carbonate (TUMS) 500 MG chewable tablet  Cholecalciferol (VITAMIN D3) 25 MCG TABS  cyanocobalamin (VITAMIN B-12) 1000 MCG tablet  gabapentin (NEURONTIN) 600 MG tablet  hydrOXYzine (ATARAX) 25 MG tablet  loperamide (IMODIUM A-D) 2 MG tablet  loperamide (IMODIUM A-D) 2 MG tablet  medical cannabis (Patient's own supply)  metFORMIN (GLUCOPHAGE) 500 MG tablet  Ostomy Supplies MISC  pantoprazole (PROTONIX) 40 MG EC tablet  predniSONE (DELTASONE) 1 MG tablet  simethicone (MYLICON) 80 MG chewable tablet  Tirzepatide (MOUNJARO) 2.5 MG/0.5ML SOPN  venlafaxine (EFFEXOR) 75 MG tablet  VITRON-C  MG TABS tablet      Review of Systems    All other systems are reviewed and are negative    Physical Exam   BP: 134/87  Pulse: 83  Temp: 97.6  F (36.4  C)  Resp: 18  Height: 160 cm (5' 3\")  Weight: 99.8 kg (220 lb)  SpO2: 100 %      Physical Exam     Nursing note and vitals were reviewed.  Constitutional: Awake and alert, adequately nourished and developed appearing 47-year-old in moderate discomfort, who does not appear acutely ill, and who answers questions appropriately and cooperates with examination.  HEENT: Atraumatic and normocephalic.  PERRL EOMI.   Neck: Freely mobile.  Cardiovascular: Cardiac examination reveals normal heart rate and regular rhythm without murmur.  Pulmonary/Chest: Breathing is unlabored.  Breath sounds are clear and equal bilaterally.  There no retractions, tachypnea, rales, wheezes, or rhonchi.  Abdomen: Soft, tender in the epigastrium and right upper quadrant without " peritoneal signs and mild diffuse abdominal tenderness elsewhere but no peritoneal signs and no rebound or guarding except in the right upper quadrant.  Musculoskeletal: Extremities are warm and well-perfused and without edema  Neurological: Alert, oriented, thought content logical, coherent   Skin: Warm, dry, no rashes.  Psychiatric: Affect congruent with acute pain.    ED Course                 Procedures              EKG Interpretation:      Interpreted by Irwin Mcfarland MD  Time reviewed: 09:43  Symptoms at time of EKG: chest pain   Rhythm: normal sinus   Rate: normal  Axis: normal  Ectopy: none  Conduction: normal  ST Segments/ T Waves: No ST-T wave changes  Q Waves: none  Comparison to prior: Unchanged from 11/11/22    Clinical Impression: normal EKG    Critical Care time:  none               Results for orders placed or performed during the hospital encounter of 01/10/23 (from the past 24 hour(s))   [place] Draw    Narrative    The following orders were created for panel order [place] Draw.  Procedure                               Abnormality         Status                     ---------                               -----------         ------                     Extra Blue Top Tube[[id number]]                              Final result               Extra Red Top Tube[[id number]]                               Final result                 Please view results for these tests on the individual orders.   CBC with Platelets & Differential    Narrative    The following orders were created for panel order CBC with Platelets & Differential.  Procedure                               Abnormality         Status                     ---------                               -----------         ------                     CBC with platelets and d...[[id number]]                      Final result                 Please view results for these tests on the individual orders.   Basic metabolic panel   Result Value Ref Range    Sodium  140 136 - 145 mmol/L    Potassium 4.3 3.4 - 5.3 mmol/L    Chloride 104 98 - 107 mmol/L    Carbon Dioxide (CO2) 24 22 - 29 mmol/L    Anion Gap 12 7 - 15 mmol/L    Urea Nitrogen 10.5 6.0 - 20.0 mg/dL    Creatinine 0.68 0.51 - 0.95 mg/dL    Calcium 9.6 8.6 - 10.0 mg/dL    Glucose 118 (H) 70 - 99 mg/dL    GFR Estimate >90 >60 mL/min/1.73m2   Troponin T, High Sensitivity   Result Value Ref Range    Troponin T, High Sensitivity 6 <=14 ng/L   Extra Blue Top Tube   Result Value Ref Range    Hold Specimen JIC    Extra Red Top Tube   Result Value Ref Range    Hold Specimen JIC    CBC with platelets and differential   Result Value Ref Range    WBC Count 10.2 4.0 - 11.0 10e3/uL    RBC Count 4.27 3.80 - 5.20 10e6/uL    Hemoglobin 11.9 11.7 - 15.7 g/dL    Hematocrit 36.3 35.0 - 47.0 %    MCV 85 78 - 100 fL    MCH 27.9 26.5 - 33.0 pg    MCHC 32.8 31.5 - 36.5 g/dL    RDW 14.7 10.0 - 15.0 %    Platelet Count 392 150 - 450 10e3/uL    % Neutrophils 64 %    % Lymphocytes 24 %    % Monocytes 6 %    % Eosinophils 4 %    % Basophils 1 %    % Immature Granulocytes 1 %    NRBCs per 100 WBC 0 <1 /100    Absolute Neutrophils 6.6 1.6 - 8.3 10e3/uL    Absolute Lymphocytes 2.4 0.8 - 5.3 10e3/uL    Absolute Monocytes 0.6 0.0 - 1.3 10e3/uL    Absolute Eosinophils 0.4 0.0 - 0.7 10e3/uL    Absolute Basophils 0.1 0.0 - 0.2 10e3/uL    Absolute Immature Granulocytes 0.1 <=0.4 10e3/uL    Absolute NRBCs 0.0 10e3/uL   Hepatic panel   Result Value Ref Range    Protein Total 6.9 6.4 - 8.3 g/dL    Albumin 3.8 3.5 - 5.2 g/dL    Bilirubin Total <0.2 <=1.2 mg/dL    Alkaline Phosphatase 89 35 - 104 U/L    AST 25 10 - 35 U/L    ALT 33 10 - 35 U/L    Bilirubin Direct <0.20 0.00 - 0.30 mg/dL   Lipase   Result Value Ref Range    Lipase 21 13 - 60 U/L   CT Chest (PE) Abdomen Pelvis w Contrast    Narrative    CT CHEST PE, ABDOMEN & PELVIS WITH CONTRAST January 10, 2023 12:31  PM    CLINICAL HISTORY: Central pain radiating interscapular. History of  metastatic  melanoma. Ulcerative colitis status post colectomy. Rapid  weight loss.    TECHNIQUE: CT angiogram chest and routine CT abdomen pelvis with IV  contrast. Arterial phase through the chest and venous phase through  the abdomen and pelvis. 2D and 3D MIP reconstructions were performed  by the CT technologist. Dose reduction techniques were used.  CONTRAST: 89 mL Isovue 370.    COMPARISON: CT abdomen and pelvis 11/11/2022, CT chest, abdomen and  pelvis 1/7/2022.    FINDINGS:  ANGIOGRAM CHEST: Pulmonary arteries are normal caliber and negative  for pulmonary emboli. Thoracic aorta is negative for dissection. No CT  evidence of right heart strain.     LUNGS AND PLEURA: No effusion. No acute airspace disease. Minor  bibasilar atelectasis. No acute airspace disease.    MEDIASTINUM/AXILLAE: No acute abnormality. No suspicious lymph nodes.    CORONARY ARTERY CALCIFICATION: None.    HEPATOBILIARY: There is hypoenhancing nodular and linear region at the  periphery of the right liver, for example segment 7 measuring 2.2 cm  series 10 image 28. This shows stability in the short interval. This  is clearly demonstrated on an older CT from 6/3/2021. A portion of  this is linear extending superiorly, for example series 10 images 17  through 24. No acute hepatic abnormality. Gallbladder unremarkable.    PANCREAS: Atrophic body and tail. No acute abnormality.    SPLEEN: Normal.    ADRENAL GLANDS: Normal.    KIDNEYS/BLADDER: Normal.    BOWEL: Subtotal colectomy and right abdominal ileostomy appears  stable. Stable parastomal fat-containing hernia. Stable Aureliano  pouch. No acute inflammatory change of the bowel is seen. No abscess  or free air.    LYMPH NODES: Normal.    PELVIC ORGANS: No acute abnormality.    OTHER: None.    MUSCULOSKELETAL: No convincing aggressive or acute osseous  abnormality.      Impression    IMPRESSION:  1.  No evidence for pulmonary embolism, acute aortic abnormality, or  acute airspace disease.  2.   Indeterminate hypoenhancement along the right hepatic dome  compared to recent imaging but this appears new since 2021. As such  this is considered indeterminate. A neoplastic etiology is not  entirely excluded. Recommend correlation with nonurgent hepatic  specific MRI.    BHAVIN GARCIA MD         SYSTEM ID:  [id number]   US Abdomen Limited (RUQ)    Narrative    ULTRASOUND ABDOMEN LIMITED January 10, 2023 2:22 PM    CLINICAL HISTORY: Right upper quadrant pain.    TECHNIQUE: Limited abdominal ultrasound.    COMPARISON: CT chest, abdomen and pelvis 1/10/2023.    FINDINGS:    GALLBLADDER: The gallbladder is normal. No gallstones, wall  thickening, or pericholecystic fluid. Negative sonographic Mandel's  sign.    BILE DUCTS: There is no biliary dilatation. The common duct measures 4  mm.    LIVER: Fatty liver. Liver is 17.7 cm. No visible focal hepatic lesion.  The abnormality at CT is unable to be seen.    RIGHT KIDNEY: No hydronephrosis.    PANCREAS: The visualized portions of the pancreas are normal.    No ascites.      Impression    IMPRESSION:  1.  No gallstones. Negative sonographic Mandel's sign.  2.  Fatty liver.    BHAVIN GARCIA MD         SYSTEM ID:  [id number]     *Note: Due to a large number of results and/or encounters for the requested time period, some results have not been displayed. A complete set of results can be found in Results Review.       Medications   HYDROmorphone (PF) (DILAUDID) injection 0.5 mg (0.5 mg Intravenous Given 1/10/23 1428)   HYDROmorphone (DILAUDID) injection 1 mg (1 mg Intravenous Given 1/10/23 1151)   iopamidol (ISOVUE-370) solution 89 mL (89 mLs Intravenous Given 1/10/23 1208)   sodium chloride 0.9 % bag 500mL for CT scan flush use (100 mLs Intravenous Given 1/10/23 1209)   ondansetron (ZOFRAN) injection 4 mg (4 mg Intravenous Given 1/10/23 1427)     Met with the patient and reviewed the results of the laboratory studies and CT scan which are all normal.  I still have a high clinical  index of suspicion for gallbladder disease as a cause for her symptoms.  Therefore we will obtain a right upper quadrant ultrasound.  She continues to have pain.  She is required multiple doses of parenteral narcotic medication.    15:00: Ultrasound results reviewed.  These are felt to be normal.  We will proceed with hepatobiliary scan.  Patient continues to be markedly uncomfortable with minimal relief of her symptoms.  Assessments & Plan (with Medical Decision Making)     47-year-old female presented with right upper quadrant and substernal chest pain radiating to the interscapular region as described above.  This occurs in the context of a significant recent weight loss that was intentional and associated with use of weight loss medication.  It occurs in the background of the patient with metastatic melanoma who developed ulcerative colitis as a result of biological therapy leading to colectomy and now has ileostomy.  She is tender in the right upper quadrant and epigastrium.  Her pain was poorly controlled and required multiple doses of parenteral narcotic medication in the emergency department.  I have a high clinical suspicion for biliary colic.  CT and ultrasound are nondiagnostic and laboratory studies are reassuring.  Her pain however is poorly controlled.  We will proceed to hepatobiliary scan.  Disposition will depend on results of the scan and the clinical course of her pain.  At shift change her care was transitioned to Dr. Soliz awaiting results of these tests.    I have reviewed the nursing notes.    I have reviewed the findings, diagnosis, plan and need for follow up with the patient.       Medical Decision Making  The patient presented with a problem that is an acute illness with systemic symptoms.    The patient's evaluation involved:  review of 3+ prior external note(s) (see separate area of note for details)    The patient's management involved a parenteral controlled substance and a decision  regarding hospitalization.        New Prescriptions    No medications on file       Final diagnoses:   RUQ abdominal pain       1/10/2023   [facility] EMERGENCY DEPT     Irwin Mcfarland MD  01/10/23 [id number]

## 2023-01-10 NOTE — ED TRIAGE NOTES
"Pt here with upper chest pain that started last night. \"[place] like heart burn, but antacids didn't help, it kept me up all night\". Pt drove herself here.      Triage Assessment     Row Name 01/10/23 0934       Triage Assessment (Adult)    Airway WDL WDL       Respiratory WDL    Respiratory WDL WDL       Skin Circulation/Temperature WDL    Skin Circulation/Temperature WDL WDL       Cardiac WDL    Cardiac WDL chest pain       Chest Pain Assessment    Chest Pain Location anterior chest, left;anterior chest, right    Chest Pain Radiation shoulder       Peripheral/Neurovascular WDL    Peripheral Neurovascular WDL WDL       Cognitive/Neuro/Behavioral WDL    Cognitive/Neuro/Behavioral WDL WDL              "

## 2023-01-11 ENCOUNTER — TELEPHONE (OUTPATIENT)
Dept: GASTROENTEROLOGY | Facility: CLINIC | Age: 48
End: 2023-01-11

## 2023-01-11 ENCOUNTER — HOSPITAL ENCOUNTER (OUTPATIENT)
Facility: CLINIC | Age: 48
Setting detail: OBSERVATION
Discharge: HOME | End: 2023-01-13
Attending: EMERGENCY MEDICINE | Admitting: PEDIATRICS
Payer: COMMERCIAL

## 2023-01-11 VITALS
WEIGHT: 220 LBS | DIASTOLIC BLOOD PRESSURE: 69 MMHG | HEART RATE: 89 BPM | SYSTOLIC BLOOD PRESSURE: 105 MMHG | TEMPERATURE: 97.6 F | HEIGHT: 63 IN | OXYGEN SATURATION: 91 % | RESPIRATION RATE: 12 BRPM | BODY MASS INDEX: 38.98 KG/M2

## 2023-01-11 DIAGNOSIS — K82.8 BILIARY DYSKINESIA: ICD-10-CM

## 2023-01-11 DIAGNOSIS — K21.9 GASTROESOPHAGEAL REFLUX DISEASE WITHOUT ESOPHAGITIS: Chronic | ICD-10-CM

## 2023-01-11 DIAGNOSIS — F41.1 ANXIETY STATE: Chronic | ICD-10-CM

## 2023-01-11 DIAGNOSIS — R07.89 OTHER CHEST PAIN: ICD-10-CM

## 2023-01-11 DIAGNOSIS — M19.90 ARTHRITIS: ICD-10-CM

## 2023-01-11 DIAGNOSIS — G89.18 ACUTE POST-OPERATIVE PAIN: ICD-10-CM

## 2023-01-11 DIAGNOSIS — R07.9 ACUTE CHEST PAIN: ICD-10-CM

## 2023-01-11 DIAGNOSIS — F43.29 ADJUSTMENT DISORDER WITH MIXED EMOTIONAL FEATURES: ICD-10-CM

## 2023-01-11 DIAGNOSIS — Z11.52 ENCOUNTER FOR SCREENING LABORATORY TESTING FOR SEVERE ACUTE RESPIRATORY SYNDROME CORONAVIRUS 2 (SARS-COV-2): ICD-10-CM

## 2023-01-11 DIAGNOSIS — M94.0 COSTOCHONDRITIS: ICD-10-CM

## 2023-01-11 DIAGNOSIS — R10.13 ABDOMINAL PAIN, EPIGASTRIC: ICD-10-CM

## 2023-01-11 DIAGNOSIS — K29.50 CHRONIC GASTRITIS, PRESENCE OF BLEEDING UNSPECIFIED, UNSPECIFIED GASTRITIS TYPE: Primary | ICD-10-CM

## 2023-01-11 LAB
ALBUMIN SERPL BCG-MCNC: 4.1 G/DL (ref 3.5–5.2)
ALBUMIN UR-MCNC: 10 MG/DL
ALP SERPL-CCNC: 91 U/L (ref 35–104)
ALT SERPL W P-5'-P-CCNC: 34 U/L (ref 10–35)
ANION GAP SERPL CALCULATED.3IONS-SCNC: 14 MMOL/L (ref 7–15)
APPEARANCE UR: ABNORMAL
AST SERPL W P-5'-P-CCNC: 26 U/L (ref 10–35)
BACTERIA #/AREA URNS HPF: ABNORMAL /HPF
BASOPHILS # BLD AUTO: 0.1 10E3/UL (ref 0–0.2)
BASOPHILS NFR BLD AUTO: 1 %
BILIRUB SERPL-MCNC: 0.2 MG/DL
BILIRUB UR QL STRIP: NEGATIVE
BUN SERPL-MCNC: 9.6 MG/DL (ref 6–20)
CALCIUM SERPL-MCNC: 9.7 MG/DL (ref 8.6–10)
CHLORIDE SERPL-SCNC: 103 MMOL/L (ref 98–107)
COLOR UR AUTO: ABNORMAL
CREAT SERPL-MCNC: 0.8 MG/DL (ref 0.51–0.95)
DEPRECATED HCO3 PLAS-SCNC: 22 MMOL/L (ref 22–29)
EOSINOPHIL # BLD AUTO: 0.3 10E3/UL (ref 0–0.7)
EOSINOPHIL NFR BLD AUTO: 3 %
ERYTHROCYTE [DISTWIDTH] IN BLOOD BY AUTOMATED COUNT: 15 % (ref 10–15)
FLUAV RNA SPEC QL NAA+PROBE: NEGATIVE
FLUBV RNA RESP QL NAA+PROBE: NEGATIVE
GFR SERPL CREATININE-BSD FRML MDRD: >90 ML/MIN/1.73M2
GLUCOSE SERPL-MCNC: 120 MG/DL (ref 70–99)
GLUCOSE UR STRIP-MCNC: NEGATIVE MG/DL
HCT VFR BLD AUTO: 40.7 % (ref 35–47)
HGB BLD-MCNC: 12.7 G/DL (ref 11.7–15.7)
HGB UR QL STRIP: NEGATIVE
HOLD SPECIMEN: NORMAL
IMM GRANULOCYTES # BLD: 0.1 10E3/UL
IMM GRANULOCYTES NFR BLD: 1 %
KETONES UR STRIP-MCNC: 40 MG/DL
LEUKOCYTE ESTERASE UR QL STRIP: ABNORMAL
LIPASE SERPL-CCNC: 19 U/L (ref 13–60)
LYMPHOCYTES # BLD AUTO: 2.1 10E3/UL (ref 0.8–5.3)
LYMPHOCYTES NFR BLD AUTO: 20 %
MCH RBC QN AUTO: 27.5 PG (ref 26.5–33)
MCHC RBC AUTO-ENTMCNC: 31.2 G/DL (ref 31.5–36.5)
MCV RBC AUTO: 88 FL (ref 78–100)
MONOCYTES # BLD AUTO: 0.6 10E3/UL (ref 0–1.3)
MONOCYTES NFR BLD AUTO: 6 %
MUCOUS THREADS #/AREA URNS LPF: PRESENT /LPF
NEUTROPHILS # BLD AUTO: 7.2 10E3/UL (ref 1.6–8.3)
NEUTROPHILS NFR BLD AUTO: 69 %
NITRATE UR QL: NEGATIVE
NRBC # BLD AUTO: 0 10E3/UL
NRBC BLD AUTO-RTO: 0 /100
PH UR STRIP: 5 [PH] (ref 5–7)
PLATELET # BLD AUTO: 448 10E3/UL (ref 150–450)
POTASSIUM SERPL-SCNC: 4 MMOL/L (ref 3.4–5.3)
PROT SERPL-MCNC: 7.2 G/DL (ref 6.4–8.3)
RBC # BLD AUTO: 4.61 10E6/UL (ref 3.8–5.2)
RBC URINE: 1 /HPF
RSV RNA SPEC NAA+PROBE: NEGATIVE
SARS-COV-2 RNA RESP QL NAA+PROBE: NEGATIVE
SODIUM SERPL-SCNC: 139 MMOL/L (ref 136–145)
SP GR UR STRIP: 1.02 (ref 1–1.03)
SQUAMOUS EPITHELIAL: 3 /HPF
TRANSITIONAL EPI: <1 /HPF
TROPONIN T SERPL HS-MCNC: <6 NG/L
UROBILINOGEN UR STRIP-MCNC: NORMAL MG/DL
WBC # BLD AUTO: 10.3 10E3/UL (ref 4–11)
WBC URINE: 3 /HPF

## 2023-01-11 PROCEDURE — 250N000009 HC RX 250: Performed by: EMERGENCY MEDICINE

## 2023-01-11 PROCEDURE — 99285 EMERGENCY DEPT VISIT HI MDM: CPT | Mod: 25 | Performed by: EMERGENCY MEDICINE

## 2023-01-11 PROCEDURE — 258N000003 HC RX IP 258 OP 636: Performed by: STUDENT IN AN ORGANIZED HEALTH CARE EDUCATION/TRAINING PROGRAM

## 2023-01-11 PROCEDURE — 85025 COMPLETE CBC W/AUTO DIFF WBC: CPT | Performed by: EMERGENCY MEDICINE

## 2023-01-11 PROCEDURE — 36415 COLL VENOUS BLD VENIPUNCTURE: CPT | Performed by: EMERGENCY MEDICINE

## 2023-01-11 PROCEDURE — 96361 HYDRATE IV INFUSION ADD-ON: CPT

## 2023-01-11 PROCEDURE — 258N000003 HC RX IP 258 OP 636: Performed by: EMERGENCY MEDICINE

## 2023-01-11 PROCEDURE — C9803 HOPD COVID-19 SPEC COLLECT: HCPCS | Performed by: EMERGENCY MEDICINE

## 2023-01-11 PROCEDURE — 250N000013 HC RX MED GY IP 250 OP 250 PS 637: Performed by: EMERGENCY MEDICINE

## 2023-01-11 PROCEDURE — 87637 SARSCOV2&INF A&B&RSV AMP PRB: CPT | Performed by: EMERGENCY MEDICINE

## 2023-01-11 PROCEDURE — 93010 ELECTROCARDIOGRAM REPORT: CPT | Performed by: EMERGENCY MEDICINE

## 2023-01-11 PROCEDURE — 96375 TX/PRO/DX INJ NEW DRUG ADDON: CPT

## 2023-01-11 PROCEDURE — 87338 HPYLORI STOOL AG IA: CPT | Performed by: EMERGENCY MEDICINE

## 2023-01-11 PROCEDURE — 80053 COMPREHEN METABOLIC PANEL: CPT | Performed by: EMERGENCY MEDICINE

## 2023-01-11 PROCEDURE — 250N000011 HC RX IP 250 OP 636

## 2023-01-11 PROCEDURE — 999N000157 HC STATISTIC RCP TIME EA 10 MIN

## 2023-01-11 PROCEDURE — C9113 INJ PANTOPRAZOLE SODIUM, VIA: HCPCS

## 2023-01-11 PROCEDURE — 250N000011 HC RX IP 250 OP 636: Performed by: EMERGENCY MEDICINE

## 2023-01-11 PROCEDURE — 99222 1ST HOSP IP/OBS MODERATE 55: CPT | Mod: AI | Performed by: INTERNAL MEDICINE

## 2023-01-11 PROCEDURE — 120N000005 HC R&B MS OVERFLOW UMMC

## 2023-01-11 PROCEDURE — 96376 TX/PRO/DX INJ SAME DRUG ADON: CPT

## 2023-01-11 PROCEDURE — 94660 CPAP INITIATION&MGMT: CPT

## 2023-01-11 PROCEDURE — 250N000013 HC RX MED GY IP 250 OP 250 PS 637

## 2023-01-11 PROCEDURE — 96361 HYDRATE IV INFUSION ADD-ON: CPT | Performed by: EMERGENCY MEDICINE

## 2023-01-11 PROCEDURE — 93005 ELECTROCARDIOGRAM TRACING: CPT | Performed by: EMERGENCY MEDICINE

## 2023-01-11 PROCEDURE — 999N000127 HC STATISTIC PERIPHERAL IV START W US GUIDANCE

## 2023-01-11 PROCEDURE — 81001 URINALYSIS AUTO W/SCOPE: CPT | Performed by: EMERGENCY MEDICINE

## 2023-01-11 PROCEDURE — 96374 THER/PROPH/DIAG INJ IV PUSH: CPT | Performed by: EMERGENCY MEDICINE

## 2023-01-11 PROCEDURE — 84484 ASSAY OF TROPONIN QUANT: CPT | Performed by: EMERGENCY MEDICINE

## 2023-01-11 PROCEDURE — 83690 ASSAY OF LIPASE: CPT | Performed by: EMERGENCY MEDICINE

## 2023-01-11 RX ORDER — PROCHLORPERAZINE MALEATE 5 MG
10 TABLET ORAL EVERY 6 HOURS PRN
Status: DISCONTINUED | OUTPATIENT
Start: 2023-01-11 | End: 2023-01-13 | Stop reason: HOSPADM

## 2023-01-11 RX ORDER — ONDANSETRON 4 MG/1
4 TABLET, ORALLY DISINTEGRATING ORAL EVERY 8 HOURS PRN
Qty: 10 TABLET | Refills: 0 | Status: ON HOLD | OUTPATIENT
Start: 2023-01-11 | End: 2023-01-13

## 2023-01-11 RX ORDER — ACETAMINOPHEN 325 MG/1
975 TABLET ORAL EVERY 4 HOURS PRN
Status: DISCONTINUED | OUTPATIENT
Start: 2023-01-11 | End: 2023-01-13 | Stop reason: HOSPADM

## 2023-01-11 RX ORDER — ONDANSETRON 4 MG/1
4 TABLET, ORALLY DISINTEGRATING ORAL EVERY 6 HOURS PRN
Status: DISCONTINUED | OUTPATIENT
Start: 2023-01-11 | End: 2023-01-13 | Stop reason: HOSPADM

## 2023-01-11 RX ORDER — SODIUM CHLORIDE 9 MG/ML
INJECTION, SOLUTION INTRAVENOUS CONTINUOUS
Status: DISCONTINUED | OUTPATIENT
Start: 2023-01-11 | End: 2023-01-13

## 2023-01-11 RX ORDER — ONDANSETRON 2 MG/ML
4 INJECTION INTRAMUSCULAR; INTRAVENOUS EVERY 6 HOURS PRN
Status: DISCONTINUED | OUTPATIENT
Start: 2023-01-11 | End: 2023-01-13 | Stop reason: HOSPADM

## 2023-01-11 RX ORDER — ACETAMINOPHEN 325 MG/1
975 TABLET ORAL EVERY 8 HOURS
Status: DISCONTINUED | OUTPATIENT
Start: 2023-01-12 | End: 2023-01-13 | Stop reason: HOSPADM

## 2023-01-11 RX ORDER — OXYCODONE HYDROCHLORIDE 5 MG/1
5 TABLET ORAL ONCE
Status: COMPLETED | OUTPATIENT
Start: 2023-01-11 | End: 2023-01-11

## 2023-01-11 RX ORDER — HYDROXYZINE HYDROCHLORIDE 25 MG/1
25 TABLET, FILM COATED ORAL EVERY 6 HOURS PRN
Status: DISCONTINUED | OUTPATIENT
Start: 2023-01-11 | End: 2023-01-13

## 2023-01-11 RX ORDER — LIDOCAINE 40 MG/G
CREAM TOPICAL
Status: DISCONTINUED | OUTPATIENT
Start: 2023-01-11 | End: 2023-01-13 | Stop reason: HOSPADM

## 2023-01-11 RX ORDER — KETOROLAC TROMETHAMINE 15 MG/ML
10 INJECTION, SOLUTION INTRAMUSCULAR; INTRAVENOUS ONCE
Status: COMPLETED | OUTPATIENT
Start: 2023-01-11 | End: 2023-01-11

## 2023-01-11 RX ORDER — ONDANSETRON 2 MG/ML
4 INJECTION INTRAMUSCULAR; INTRAVENOUS EVERY 30 MIN PRN
Status: DISCONTINUED | OUTPATIENT
Start: 2023-01-11 | End: 2023-01-13 | Stop reason: HOSPADM

## 2023-01-11 RX ORDER — PREDNISONE 1 MG/1
1 TABLET ORAL DAILY
Status: DISCONTINUED | OUTPATIENT
Start: 2023-01-12 | End: 2023-01-13 | Stop reason: HOSPADM

## 2023-01-11 RX ORDER — PANTOPRAZOLE SODIUM 40 MG/1
40 TABLET, DELAYED RELEASE ORAL
Status: DISCONTINUED | OUTPATIENT
Start: 2023-01-11 | End: 2023-01-11

## 2023-01-11 RX ORDER — ALBUTEROL SULFATE 90 UG/1
2 AEROSOL, METERED RESPIRATORY (INHALATION) EVERY 4 HOURS PRN
Status: DISCONTINUED | OUTPATIENT
Start: 2023-01-11 | End: 2023-01-13 | Stop reason: HOSPADM

## 2023-01-11 RX ORDER — DEXTROSE MONOHYDRATE 25 G/50ML
25-50 INJECTION, SOLUTION INTRAVENOUS
Status: DISCONTINUED | OUTPATIENT
Start: 2023-01-11 | End: 2023-01-13 | Stop reason: HOSPADM

## 2023-01-11 RX ORDER — SIMETHICONE 80 MG
80-160 TABLET,CHEWABLE ORAL 4 TIMES DAILY PRN
Status: DISCONTINUED | OUTPATIENT
Start: 2023-01-11 | End: 2023-01-13 | Stop reason: HOSPADM

## 2023-01-11 RX ORDER — NICOTINE POLACRILEX 4 MG
15-30 LOZENGE BUCCAL
Status: DISCONTINUED | OUTPATIENT
Start: 2023-01-11 | End: 2023-01-13 | Stop reason: HOSPADM

## 2023-01-11 RX ORDER — VENLAFAXINE 75 MG/1
150 TABLET ORAL 2 TIMES DAILY
Status: DISCONTINUED | OUTPATIENT
Start: 2023-01-11 | End: 2023-01-13 | Stop reason: HOSPADM

## 2023-01-11 RX ORDER — POLYETHYLENE GLYCOL 3350 17 G/17G
17 POWDER, FOR SOLUTION ORAL DAILY PRN
Status: DISCONTINUED | OUTPATIENT
Start: 2023-01-11 | End: 2023-01-13 | Stop reason: HOSPADM

## 2023-01-11 RX ORDER — PROCHLORPERAZINE 25 MG
25 SUPPOSITORY, RECTAL RECTAL EVERY 12 HOURS PRN
Status: DISCONTINUED | OUTPATIENT
Start: 2023-01-11 | End: 2023-01-13 | Stop reason: HOSPADM

## 2023-01-11 RX ORDER — METHOCARBAMOL 500 MG/1
1000 TABLET, FILM COATED ORAL 3 TIMES DAILY PRN
Status: DISCONTINUED | OUTPATIENT
Start: 2023-01-11 | End: 2023-01-13 | Stop reason: HOSPADM

## 2023-01-11 RX ORDER — GABAPENTIN 600 MG/1
600 TABLET ORAL DAILY
Status: DISCONTINUED | OUTPATIENT
Start: 2023-01-11 | End: 2023-01-13 | Stop reason: HOSPADM

## 2023-01-11 RX ORDER — CALCIUM CARBONATE 500 MG/1
500 TABLET, CHEWABLE ORAL 3 TIMES DAILY PRN
Status: DISCONTINUED | OUTPATIENT
Start: 2023-01-11 | End: 2023-01-13 | Stop reason: HOSPADM

## 2023-01-11 RX ORDER — SUCRALFATE ORAL 1 G/10ML
1 SUSPENSION ORAL ONCE
Status: COMPLETED | OUTPATIENT
Start: 2023-01-11 | End: 2023-01-11

## 2023-01-11 RX ORDER — LANOLIN ALCOHOL/MO/W.PET/CERES
3 CREAM (GRAM) TOPICAL
Status: DISCONTINUED | OUTPATIENT
Start: 2023-01-11 | End: 2023-01-13 | Stop reason: HOSPADM

## 2023-01-11 RX ORDER — MORPHINE SULFATE 4 MG/ML
4 INJECTION, SOLUTION INTRAMUSCULAR; INTRAVENOUS
Status: COMPLETED | OUTPATIENT
Start: 2023-01-11 | End: 2023-01-11

## 2023-01-11 RX ORDER — OXYCODONE AND ACETAMINOPHEN 5; 325 MG/1; MG/1
1 TABLET ORAL EVERY 6 HOURS PRN
Qty: 12 TABLET | Refills: 0 | Status: SHIPPED | OUTPATIENT
Start: 2023-01-11 | End: 2023-07-11

## 2023-01-11 RX ADMIN — PANTOPRAZOLE SODIUM 40 MG: 40 INJECTION, POWDER, FOR SOLUTION INTRAVENOUS at 19:32

## 2023-01-11 RX ADMIN — OXYCODONE HYDROCHLORIDE 5 MG: 5 TABLET ORAL at 11:41

## 2023-01-11 RX ADMIN — KETOROLAC TROMETHAMINE 10 MG: 15 INJECTION, SOLUTION INTRAMUSCULAR; INTRAVENOUS at 16:37

## 2023-01-11 RX ADMIN — PANTOPRAZOLE SODIUM 40 MG: 40 TABLET, DELAYED RELEASE ORAL at 16:26

## 2023-01-11 RX ADMIN — MORPHINE SULFATE 4 MG: 4 INJECTION INTRAVENOUS at 16:37

## 2023-01-11 RX ADMIN — METHOCARBAMOL 1000 MG: 500 TABLET ORAL at 18:07

## 2023-01-11 RX ADMIN — METFORMIN HYDROCHLORIDE 1000 MG: 500 TABLET ORAL at 20:14

## 2023-01-11 RX ADMIN — ACETAMINOPHEN 975 MG: 325 TABLET, FILM COATED ORAL at 18:07

## 2023-01-11 RX ADMIN — SODIUM CHLORIDE 1000 ML: 9 INJECTION, SOLUTION INTRAVENOUS at 12:13

## 2023-01-11 RX ADMIN — SUCRALFATE 1 G: 1 SUSPENSION ORAL at 18:07

## 2023-01-11 RX ADMIN — DICLOFENAC SODIUM 4 G: 10 GEL TOPICAL at 20:15

## 2023-01-11 RX ADMIN — SODIUM CHLORIDE: 9 INJECTION, SOLUTION INTRAVENOUS at 15:30

## 2023-01-11 RX ADMIN — ONDANSETRON HYDROCHLORIDE 4 MG: 2 INJECTION, SOLUTION INTRAMUSCULAR; INTRAVENOUS at 12:15

## 2023-01-11 RX ADMIN — SODIUM CHLORIDE: 9 INJECTION, SOLUTION INTRAVENOUS at 22:25

## 2023-01-11 RX ADMIN — VENLAFAXINE 150 MG: 75 TABLET ORAL at 20:15

## 2023-01-11 RX ADMIN — ONDANSETRON HYDROCHLORIDE 4 MG: 2 INJECTION, SOLUTION INTRAMUSCULAR; INTRAVENOUS at 16:25

## 2023-01-11 RX ADMIN — ONDANSETRON 4 MG: 2 INJECTION INTRAMUSCULAR; INTRAVENOUS at 22:04

## 2023-01-11 RX ADMIN — GABAPENTIN 600 MG: 600 TABLET, FILM COATED ORAL at 19:34

## 2023-01-11 RX ADMIN — LIDOCAINE HYDROCHLORIDE 30 ML: 20 SOLUTION ORAL; TOPICAL at 15:27

## 2023-01-11 ASSESSMENT — ACTIVITIES OF DAILY LIVING (ADL)
ADLS_ACUITY_SCORE: 37
ADLS_ACUITY_SCORE: 20
ADLS_ACUITY_SCORE: 37
ADLS_ACUITY_SCORE: 35

## 2023-01-11 NOTE — CONSULTS
"    [facility]    Consult Note - Emergency General Surgery Service  Date of Admission:  1/11/2023  Consult Requested by: ED- Dr. Barros  Reason for Consult: Abdominal pain    Assessment & Plan: Surgery   Doretha Yu is a 47 year old female presenting to the ED on 1/11/2023 with chief complaint of chest pain and 3 months of nausea. Workup demonstrated no evidence of cholelithiasis or cholecystitis. HIDA did demonstrate a low GB EF of 7% (no pain during exam). Labs unremarkable and non-tender in the RUQ on exam. Presentation inconsistent with the episodic symptoms that are typically of biliary colic. Many alternative diagnoses to consider that are more likely given presentation such as: gastroparesis, GERD, gastritis, and cannabis induced nausea. Finally, given the location of the ileostomy, it would be difficult to remove the gallbladder laparoscopically.There would be a much higher risk of needing to be done open which would carry significant morbidity. Taking on this risk is unreasonable in the setting of diagnostic uncertainty and we cannot be confident that it will improve her symptoms. This was discussed with the patient and she expressed understanding.  - No indication for surgery  - Keep follow up with Dr. Ram and GI. Could consider cholecystectomy at the time of ileostomy takedown.    Clinically Significant Risk Factors Present on Admission                      # DMII: A1C = 6.6 % (Ref range: 0.0 - 5.6 %) within past 3 months    # Obesity: Estimated body mass index is 38.79 kg/m  as calculated from the following:    Height as of this encounter: 1.6 m (5' 3\").    Weight as of this encounter: 99.3 kg (219 lb).         The patient's care was discussed with the Attending Physician, Dr. Bhatt.    Ale Lopez MD, MS  General Surgery, PGY-7  Pg [phone]      [facility]  Non-urgent messages: Securely " message with Patricia (more info)  Text page via [facility] Paging/Directory     ______________________________________________________________________    Chief Complaint   Nausea and chest pain  History is obtained from the patient    History of Present Illness   Doretha Yu is a 47 year old female with PMH of UC (s/p TAC w/ end ileostomy), melanoma, and T2DM who presents with 3-4 months of nausea and epigastric discomfort. The nausea is persistent throughout the day and night. Nothing makes it worse or better. Over the past 2-3 days, she has developed chest, back and neck pain in addition to her persistent nausea. She endorses general abdominal discomfort but not focal areas of pain. Reports she feels bloated and was concerned that she might have an obstruction but her ileostomy has remained functional.    EGD done in Oct 2022 and demonstrated chronic gastritis. She does take protonix daily. Of note, she does use medical cannabis as well. She is currently working on losing weight to be considered for ileostomy takedown. She has lost almost 30lbs over the last few months and is taking medications to aid in weight loss.    Past Medical History    Past Medical History:   Diagnosis Date     Abnormal MRI     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      Anxiety      Basal cell carcinoma      Cervical high risk HPV (human papillomavirus) test positive 2019    See problem list     Colitis      Depression      Diabetes mellitus, iatrogenic (H) 2020     Esophageal reflux      Inflammatory arthritis      Insomnia      Intestinal giardiasis 2018     Lumbago     left lower back pain     Lymphedema      Malignant melanoma (H)      Melanoma (H) 10/23/2017     Migraines      Mild persistent asthma      Morbid obesity with BMI of 40.0-44.9, adult (H)      STORMY (obstructive sleep apnea)      Prothrombin deficiency (H)     takes 81mg asa daily     Stroke (cerebrum) (H)     During      TIA  (transient ischemic attack)      Type 2 diabetes mellitus (H)      Past Surgical History   Past Surgical History:   Procedure Laterality Date     APPENDECTOMY  2004     COLONOSCOPY N/A 10/18/2017    Procedure: COLONOSCOPY;  Colon;  Surgeon: Debbie Stephens MD;  Location: [facility]     COLONOSCOPY N/A 2018    Procedure: COMBINED COLONOSCOPY, SINGLE OR MULTIPLE BIOPSY/POLYPECTOMY BY BIOPSY;  colon;  Surgeon: Benita Schumacher MD;  Location: [facility] GI     COLONOSCOPY      multiple since  to present - about 6 total     DISSECT LYMPH NODE AXILLA Left 10/23/2017    Procedure: DISSECT LYMPH NODE AXILLA;  Left Axillary Lymph Node Dissection ;  Surgeon: Laurent Cool MD;  Location: [facility] OR     EXAM UNDER ANESTHESIA PELVIC N/A 2020    Procedure: EXAM UNDER ANESTHESIA, PELVIS; with Cervical Biopsies, Vaginal Biopsy and Endocervical Curettings;  Surgeon: Melina Jung MD;  Location: [facility] OR     GYN SURGERY  ,          LAPAROSCOPIC ASSISTED COLECTOMY N/A 06/15/2021    Procedure: laparoscopic total abdominal colectomy, end ileostomy;  Surgeon: Quinton Ram MD;  Location: [facility] OR     REPAIR MOHS Left 2017    Procedure: REPAIR MOHS;  Left Upper Lid Moh's Reconstruction;  Surgeon: Kisha Bosch MD;  Location: [facility] OR     Prior to Admission Medications   Current Facility-Administered Medications   Medication     lidocaine (viscous) (XYLOCAINE) 2 % 15 mL, alum & mag hydroxide-simethicone (MAALOX) 15 mL GI Cocktail     ondansetron (ZOFRAN) injection 4 mg     sodium chloride 0.9% infusion     Current Outpatient Medications   Medication Sig     acetaminophen (TYLENOL) 325 MG tablet Take 3 tablets (975 mg) by mouth every 8 hours As scheduled for the next 7-10 days, then decrease both dose & frequency to as needed there after.     albuterol (PROAIR HFA/PROVENTIL HFA/VENTOLIN HFA) 108 (90 Base) MCG/ACT inhaler Inhale 2 puffs into the lungs every 4 hours as needed for shortness of breath /  dyspnea     Calcium Carb-Cholecalciferol 600-800 MG-UNIT TABS Take 800 mg by mouth every morning (before breakfast)     calcium carbonate (TUMS) 500 MG chewable tablet Take 1 tablet (500 mg) by mouth 3 times daily as needed for heartburn     Cholecalciferol (VITAMIN D3) 25 MCG TABS TAKE THREE TABLETS BY MOUTH ONCE DAILY     cyanocobalamin (VITAMIN B-12) 1000 MCG tablet Take 1 tablet (1,000 mcg) by mouth daily     gabapentin (NEURONTIN) 600 MG tablet TAKE ONE TABLET (600MG) BY MOUTH FOUR TIMES A DAY (Patient taking differently: Take 600 mg by mouth daily)     hydrOXYzine (ATARAX) 25 MG tablet Take 1 tablet (25 mg) by mouth every 6 hours as needed for anxiety     loperamide (IMODIUM A-D) 2 MG tablet Take 2 tabs daily and increase as needed until output is apple sauce consistency. Max of 8 tabs (16 mg) per day.     loperamide (IMODIUM A-D) 2 MG tablet Take 2 tablets (4 mg) by mouth 4 times daily as needed for diarrhea     medical cannabis (Patient's own supply) See Admin Instructions (The purpose of this order is to document that the patient reports taking medical cannabis.  This is not a prescription, and is not used to certify that the patient has a qualifying medical condition.)     metFORMIN (GLUCOPHAGE) 500 MG tablet Take 2 tablets (1,000 mg) by mouth 2 times daily (with meals) ** VISIT DUE/LAST FILL **     ondansetron (ZOFRAN ODT) 4 MG ODT tab Take 1 tablet (4 mg) by mouth every 8 hours as needed for nausea     Ostomy Supplies MISC 20 each daily     oxyCODONE-acetaminophen (PERCOCET) 5-325 MG tablet Take 1 tablet by mouth every 6 hours as needed for severe pain (7-10)     pantoprazole (PROTONIX) 40 MG EC tablet TAKE 1 TABLET BY MOUTH EVERY MORNING BEFORE BREAKFAST     predniSONE (DELTASONE) 1 MG tablet Take 2 tablets (2 mg) by mouth daily Take 10 mg daily. Taper 1 mg every 2 weeks (Patient taking differently: Take 1 mg by mouth daily Taper 1 mg every 2 weeks)     simethicone (MYLICON) 80 MG chewable tablet Take  1-2 tablets ( mg) by mouth 4 times daily as needed for cramping     Tirzepatide (MOUNJARO) 2.5 MG/0.5ML SOPN Inject 2.5 mg Subcutaneous     venlafaxine (EFFEXOR) 75 MG tablet TAKE 2 TABLETS (150 MG) BY MOUTH 2 TIMES DAILY     VITRON-C  MG TABS tablet Take 1 tablet by mouth daily     Facility-Administered Medications Ordered in Other Encounters   Medication     lidocaine 1 % 9 mL     sodium bicarbonate 8.4 % injection 1 mEq      Physical Exam   Vital Signs: Temp: 98.3  F (36.8  C) Temp src: Oral BP: 128/87 Pulse: 86   Resp: 16 SpO2: 98 % O2 Device: None (Room air)    Weight: 219 lbs 0 oz   Gen: in NAD  CV: RRR  Pulm: Non-labored breathing  Abd: Soft, mildly distended, non-tender, ileostomy with liquid output  Ext: wwp    Data     I have personally reviewed the following data over the past 24 hrs:    10.3  \   12.7   / 448     139 103 9.6 /  120 (H)   4.0 22 0.80 \       ALT: 34 AST: 26 AP: 91 TBILI: 0.2   ALB: 4.1 TOT PROTEIN: 7.2 LIPASE: 19       Trop: <6 BNP: N/A       Imaging results reviewed over the past 24 hrs:   Recent Results (from the past 24 hour(s))   US Abdomen Limited (RUQ)    Narrative    ULTRASOUND ABDOMEN LIMITED January 10, 2023 2:22 PM    CLINICAL HISTORY: Right upper quadrant pain.    TECHNIQUE: Limited abdominal ultrasound.    COMPARISON: CT chest, abdomen and pelvis 1/10/2023.    FINDINGS:    GALLBLADDER: The gallbladder is normal. No gallstones, wall  thickening, or pericholecystic fluid. Negative sonographic Mandel's  sign.    BILE DUCTS: There is no biliary dilatation. The common duct measures 4  mm.    LIVER: Fatty liver. Liver is 17.7 cm. No visible focal hepatic lesion.  The abnormality at CT is unable to be seen.    RIGHT KIDNEY: No hydronephrosis.    PANCREAS: The visualized portions of the pancreas are normal.    No ascites.      Impression    IMPRESSION:  1.  No gallstones. Negative sonographic Mandel's sign.  2.  Fatty liver.    BHAVIN GARCIA MD         SYSTEM ID:  [id number]    NM Hepatobiliary Scan w GB EF    Narrative    EXAM: NUCLEAR MEDICINE HEPATOBILIARY SCAN WITH GALLBLADDER EJECTION FRACTION  LOCATION: [facility]  DATE/TIME: 1/10/2023 9:58 PM    INDICATION: Right upper quadrant abdominal pain.  COMPARISON: 11/11/2022 - CT abdomen and pelvis.  TECHNIQUE: 6.8 mCi of technetium-99m mebrofenin, IV. Anterior planar imaging of the abdomen. 2.0 mcg of cholecystokinin analog, IV. Gallbladder imaging for 30-60 minutes.    FINDINGS: Normal radiotracer uptake by the liver and excretion into the intrahepatic bile ducts, common bile duct and duodenum. The gallbladder fills with radiotracer. On the post-cholecystokinin images, the gallbladder contracts with a measured ejection   fraction of 7% (Normal range = 35% or greater).      Impression    IMPRESSION:  1. The gallbladder fills with radiotracer and there is therefore no scintigraphic evidence of acute cholecystitis.  2. Low gallbladder ejection fraction of 7%. In the appropriate clinical setting, this is suggestive of biliary dyskinesia/dysfunction.

## 2023-01-11 NOTE — H&P
[facility]    History and Physical - Medicine Service, MARREG[facility] TEAM        Date of Admission:  1/11/2023    Assessment & Plan      Doretha Yu is a 47 year old female with PMH significant for GERD, asthma, metastatic malignant melanoma, anxiety, obesity, STORMY, diabetes, UC s/p TAC with end ileostomy, and RA here for acute on chronic abdominal pain with nausea and new onset costochondritis    #Gallbladder Dysfunction, EF 7%  #UC s/p Ileostomy and TAC   #Gastritis   #Cannabinoid Use   Presents with 3-4 month history of abdominal pain now with 72hrs of chest and back pain with exquisite tenderness on exam. GI cocktail in ED with some relieve. CT unremarkable for acute illness, however, shows possible mass in the liver. Malignancy cannot be ruled out. CMP unremarkable. Lipase negative. No vomiting.  No surgical intervention as per surgery due to ileostomy (see surgical note). No evidence of cholecystitis or cholelithiasis. CT at [facility] unremarkable. No leukocytosis. Will hold off on antibiotics given patient is stable, pain has improved with symptomatic relieve and likely is mostly due to medications. If symptoms persists, may require upper endoscopy by GI with biopsy. Concern for liver mass on CT, c/f malignancy given history of malignant melanoma without primary source.  Bowel obstruction was considered, although no acute concern.   -GenSurg consulted, appreciate recs   -H Pylori testing pending   -IV PPI BID  -Consider GI consult if symptoms persist, place outpatient consult with liver MRI at time of discharge  -Avoid GLP1 meds, opioids, cannabis   -s/p 1L NS, continue mIVF at 125ml/hr    #Non-cardiac Chest Pain   #Costochondritis  #Hx of malignant melanoma with no primary tumor identified    #Vertebral Tenderness   Normal troponin. EKG unremarkable. No history or evidence of trauma or recent surgical procedures. Exquisite tenderness over the chest and back,  "mostly on thoracic vertebra with concern for malignant process, although CT not evident. Patient has being diagnosed with RA and has being undergoing prednisone taper, which has worsen her symptoms of generalized pain. No diagnosis of fibromyalgia in the past.   -PRN Methocarbamol, Voltaren gel  -APAP for pain management   -Avoid opioids     #Asymptomatic Bacteiuria  Denies urinary symptoms. No treatment indicated at the moment  -CTM for symptoms.    #T2DM  -Hypoglycemia protocol and fingersticks ordered   -Continue PTA metformin  -Hold Mounjaro      #STORMY  CPAP ordered for hospital stay    #RA  Continue PTA prednisone 1mg daily     #Anxiety and Depression  -Continue PTA Venlefaxine   -Continue PTA Gabapentin     Diet:  Carb Consistent   DVT Prophylaxis: Ambulate every shift  Major Catheter: Not present  Fluids: NS @ 125ml/hr  Lines: None     Cardiac Monitoring: None  Code Status:  Full Code     Clinically Significant Risk Factors Present on Admission                      # DMII: A1C = 6.6 % (Ref range: 0.0 - 5.6 %) within past 3 months    # Obesity: Estimated body mass index is 38.79 kg/m  as calculated from the following:    Height as of this encounter: 1.6 m (5' 3\").    Weight as of this encounter: 99.3 kg (219 lb).           Disposition Plan    General Medicine     Expected Discharge Date: 01/12/2023                The patient's care was discussed with the Attending Physician, Dr. Byrd .      German Velez Reyes, MD, PhD  Medicine Service, [facility]  Securely message with [employer] (more info)  Text page via [facility] Paging/Directory   See signed in provider for up to date coverage information  ______________________________________________________________________    Chief Complaint   Abdominal and chest pain    History is obtained from the patient    History of Present Illness   Doretha Yu is a 47 year old female with PMH significant for GERD, asthma, " "metastatic malignant melanoma, anxiety, obesity, STORMY, diabetes, UC s/p TAC with end ileostomy, and RA who presents with 72hr history of chest pain and between the shoulder blades back pain with nausea and oral intake avoidance.     She says that she has been having abdominal pain since she started Mounjaro 3 months ago. She has seen a 28lb weight loss with it, which is intentional as she has been encouraged to lose weight for ostomy take down. She is also tapering down prednisone to ensure appropriate wound healing after surgery. She stopped Mounjaro about two weeks ago and has seen very minimal relieve from abdominal pain. She has also been taking different forms for cannabinoids to help with the abdominal pain and muscle pain she suffers from RA (As per patient's report). She says she avoids cannabis usage since she doesn't like the way it makes her feel and does not seem much benefit from it, although she consumes it intermittently. She has not had anything to eat in the past 24 hours with minimal fluid intake due to the nausea. She denies vomiting, although she has felt some bile reflux, known to her from GERD.     Her chest pain started 2-3 days ago and has been worsening. She says that if feels superficial and worsens with movement. She has not had recent cough, however, she endorse recovering from the Flu near Thanksgiving holiday. She has some pain with movement in chest and between the shoulder blades that at times feels shooting in quality. She rates it at a 6-10.     She seems overwhelmed by this acute on chronic pain and cries intermittently expressing frustration about her general health status. Says that feels safe at home and is \"focused on getting better but seems to take one step forward and 3 back\".     Ms Yu denies palpitations, shortness of breath, fevers, chills, new lumps or bumps, rashes, pain or burning with urination.       Past Medical History    Past Medical History:   Diagnosis Date "     Abnormal MRI     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      Anxiety      Basal cell carcinoma      Cervical high risk HPV (human papillomavirus) test positive 2019    See problem list     Colitis      Depression      Diabetes mellitus, iatrogenic (H) 2020     Esophageal reflux      Inflammatory arthritis      Insomnia      Intestinal giardiasis 2018     Lumbago     left lower back pain     Lymphedema      Malignant melanoma (H)      Melanoma (H) 10/23/2017     Migraines      Mild persistent asthma      Morbid obesity with BMI of 40.0-44.9, adult (H)      STORMY (obstructive sleep apnea)      Prothrombin deficiency (H)     takes 81mg asa daily     Stroke (cerebrum) (H)     During      TIA (transient ischemic attack)      Type 2 diabetes mellitus (H)        Past Surgical History   Past Surgical History:   Procedure Laterality Date     APPENDECTOMY       COLONOSCOPY N/A 10/18/2017    Procedure: COLONOSCOPY;  Colon;  Surgeon: Debbie Stephens MD;  [facility]     COLONOSCOPY N/A 2018    Procedure: COMBINED COLONOSCOPY, SINGLE OR MULTIPLE BIOPSY/POLYPECTOMY BY BIOPSY;  colon;  Surgeon: Benita Schumacher MD;  Location: [facility] GI     COLONOSCOPY      multiple since  to present - about 6 total     DISSECT LYMPH NODE AXILLA Left 10/23/2017    Procedure: DISSECT LYMPH NODE AXILLA;  Left Axillary Lymph Node Dissection ;  Surgeon: Laurent Cool MD;  Location: [facility] OR     EXAM UNDER ANESTHESIA PELVIC N/A 2020    Procedure: EXAM UNDER ANESTHESIA, PELVIS; with Cervical Biopsies, Vaginal Biopsy and Endocervical Curettings;  Surgeon: Melina Jung MD;  Location: [facility] OR     GYN SURGERY  ,          LAPAROSCOPIC ASSISTED COLECTOMY N/A 06/15/2021    Procedure: laparoscopic total abdominal colectomy, end ileostomy;  Surgeon: Quinton Ram MD;  Location: [facility] OR     REPAIR MOHS Left 2017    Procedure: REPAIR MOHS;  Left Upper Lid Moh's  Reconstruction;  Surgeon: Kisha Bosch MD;  Location:  OR       Prior to Admission Medications   Prior to Admission Medications   Prescriptions Last Dose Informant Patient Reported? Taking?   Calcium Carb-Cholecalciferol 600-800 MG-UNIT TABS   No No   Sig: Take 800 mg by mouth every morning (before breakfast)   Cholecalciferol (VITAMIN D3) 25 MCG TABS   No No   Sig: TAKE THREE TABLETS BY MOUTH ONCE DAILY   Ostomy Supplies MISC   No No   Si each daily   Tirzepatide (MOUNJARO) 2.5 MG/0.5ML SOPN   Yes No   Sig: Inject 2.5 mg Subcutaneous   VITRON-C  MG TABS tablet   Yes No   Sig: Take 1 tablet by mouth daily   acetaminophen (TYLENOL) 325 MG tablet   No No   Sig: Take 3 tablets (975 mg) by mouth every 8 hours As scheduled for the next 7-10 days, then decrease both dose & frequency to as needed there after.   albuterol (PROAIR HFA/PROVENTIL HFA/VENTOLIN HFA) 108 (90 Base) MCG/ACT inhaler   No No   Sig: Inhale 2 puffs into the lungs every 4 hours as needed for shortness of breath / dyspnea   calcium carbonate (TUMS) 500 MG chewable tablet   No No   Sig: Take 1 tablet (500 mg) by mouth 3 times daily as needed for heartburn   cyanocobalamin (VITAMIN B-12) 1000 MCG tablet   Yes No   Sig: Take 1 tablet (1,000 mcg) by mouth daily   gabapentin (NEURONTIN) 600 MG tablet   No No   Sig: TAKE ONE TABLET (600MG) BY MOUTH FOUR TIMES A DAY   Patient taking differently: Take 600 mg by mouth daily   hydrOXYzine (ATARAX) 25 MG tablet   No No   Sig: Take 1 tablet (25 mg) by mouth every 6 hours as needed for anxiety   loperamide (IMODIUM A-D) 2 MG tablet   No No   Sig: Take 2 tablets (4 mg) by mouth 4 times daily as needed for diarrhea   loperamide (IMODIUM A-D) 2 MG tablet   No No   Sig: Take 2 tabs daily and increase as needed until output is apple sauce consistency. Max of 8 tabs (16 mg) per day.   medical cannabis (Patient's own supply)   Yes No   Sig: See Admin Instructions (The purpose of this order is to document  that the patient reports taking medical cannabis.  This is not a prescription, and is not used to certify that the patient has a qualifying medical condition.)   metFORMIN (GLUCOPHAGE) 500 MG tablet   No No   Sig: Take 2 tablets (1,000 mg) by mouth 2 times daily (with meals) ** VISIT DUE/LAST FILL **   ondansetron (ZOFRAN ODT) 4 MG ODT tab   No No   Sig: Take 1 tablet (4 mg) by mouth every 8 hours as needed for nausea   oxyCODONE-acetaminophen (PERCOCET) 5-325 MG tablet   No No   Sig: Take 1 tablet by mouth every 6 hours as needed for severe pain (7-10)   pantoprazole (PROTONIX) 40 MG EC tablet   No No   Sig: TAKE 1 TABLET BY MOUTH EVERY MORNING BEFORE BREAKFAST   predniSONE (DELTASONE) 1 MG tablet   No No   Sig: Take 2 tablets (2 mg) by mouth daily Take 10 mg daily. Taper 1 mg every 2 weeks   Patient taking differently: Take 1 mg by mouth daily Taper 1 mg every 2 weeks   simethicone (MYLICON) 80 MG chewable tablet   No No   Sig: Take 1-2 tablets ( mg) by mouth 4 times daily as needed for cramping   venlafaxine (EFFEXOR) 75 MG tablet   No No   Sig: TAKE 2 TABLETS (150 MG) BY MOUTH 2 TIMES DAILY      Facility-Administered Medications: None        Review of Systems    The 10 point Review of Systems is negative other than noted in the HPI.    Physical Exam   Vital Signs: Temp: 98.3  F (36.8  C) Temp src: Oral BP: 128/87 Pulse: 86   Resp: 16 SpO2: 98 % O2 Device: None (Room air)    Weight: 219 lbs 0 oz    Constitutional: awake, alert, cooperative, no apparent distress, and appears stated age  Eyes: Lids and lashes normal, pupils equal, round and reactive to light, extra ocular muscles intact, sclera clear, conjunctiva normal  ENT: Normocephalic, without obvious abnormality, atraumatic, sinuses nontender on palpation, external ears without lesions, oral pharynx with moist mucous membranes, tonsils without erythema or exudates, gums normal and good dentition.  Hematologic / Lymphatic: no cervical  lymphadenopathy  Respiratory: No increased work of breathing, good air exchange, clear to auscultation bilaterally, no crackles or wheezing  Cardiovascular: Normal apical impulse, regular rate and rhythm, normal S1 and S2, no S3 or S4, and no murmur noted  GI: Ostomy in place with gas and medium output. Tenderness over the left lower abdomen. High-pitched bowel sounds intermitently, soft, no hepatosplenomegally. Ventral hernia present. Negative Mandel's sign.   Skin: no bruising or bleeding  Musculoskeletal: There is no redness, warmth, or swelling of the joints.  Full range of motion noted.  Motor strength is 5 out of 5 all extremities bilaterally.  Tone is normal. Tenderness all over chest and back, most prominent over the thoracic spine.   Neurologic: Awake, alert, oriented to name, place and time.  Cranial nerves II-XII are grossly intact.  Motor is 5 out of 5 bilaterally.  Cerebellar finger to nose, heel to shin intact.  Sensory is intact.  Babinski down going, Romberg negative, and gait is normal.  Neuropsychiatric: General: normal, calm and normal eye contact    Medical Decision Making       Please see A&P for additional details of medical decision making.      Data     I have personally reviewed the following data over the past 24 hrs:    10.3  \   12.7   / 448     139 103 9.6 /  120 (H)   4.0 22 0.80 \       ALT: 34 AST: 26 AP: 91 TBILI: 0.2   ALB: 4.1 TOT PROTEIN: 7.2 LIPASE: 19       Trop: <6 BNP: N/A       Imaging results reviewed over the past 24 hrs:   Recent Results (from the past 24 hour(s))   NM Hepatobiliary Scan w GB EF    Narrative    EXAM: NUCLEAR MEDICINE HEPATOBILIARY SCAN WITH GALLBLADDER EJECTION FRACTION  LOCATION: [facility]  DATE/TIME: 1/10/2023 9:58 PM    INDICATION: Right upper quadrant abdominal pain.  COMPARISON: 11/11/2022 - CT abdomen and pelvis.  TECHNIQUE: 6.8 mCi of technetium-99m mebrofenin, IV. Anterior planar imaging of the abdomen. 2.0 mcg of  cholecystokinin analog, IV. Gallbladder imaging for 30-60 minutes.    FINDINGS: Normal radiotracer uptake by the liver and excretion into the intrahepatic bile ducts, common bile duct and duodenum. The gallbladder fills with radiotracer. On the post-cholecystokinin images, the gallbladder contracts with a measured ejection   fraction of 7% (Normal range = 35% or greater).      Impression    IMPRESSION:  1. The gallbladder fills with radiotracer and there is therefore no scintigraphic evidence of acute cholecystitis.  2. Low gallbladder ejection fraction of 7%. In the appropriate clinical setting, this is suggestive of biliary dyskinesia/dysfunction.

## 2023-01-11 NOTE — ED TRIAGE NOTES
"Pt brought in by ambulance from home for ongoing chest pain that radiates between shoulder pains, stomach pain and \"I can't eat anything\". Pt was evaluated at [facility] last night for this, was discharged and told to follow up outpatient GI. \"I'm in no shape to drive and I'm miserable, they just discharged me without fixing this.\" Pt got 4mg PO Zofran via EMS and took PO oxycodone at home before leaving.     Triage Assessment     Row Name 01/11/23 1016       Triage Assessment (Adult)    Airway WDL WDL       Respiratory WDL    Respiratory WDL X;rhythm/pattern    Rhythm/Pattern, Respiratory labored  \"it hurts to breathe\"       Skin Circulation/Temperature WDL    Skin Circulation/Temperature WDL WDL       Cardiac WDL    Cardiac WDL WDL       Peripheral/Neurovascular WDL    Peripheral Neurovascular WDL WDL       Cognitive/Neuro/Behavioral WDL    Cognitive/Neuro/Behavioral WDL WDL       Gisela Coma Scale    Best Eye Response 4-->(E4) spontaneous    Best Motor Response 6-->(M6) obeys commands    Best Verbal Response 5-->(V5) oriented    Colby Coma Scale Score 15              "

## 2023-01-11 NOTE — ED PROVIDER NOTES
History     Chief Complaint   Patient presents with     Chest Pain     HPI  Doretha Yu is a 47 year old female with a past medical history of GERD, asthma, metastatic malignant melanoma, anxiety, obesity, STORMY, diabetes, colitis, immunosuppression, anemia, rheumatoid arthritis who presents to the emergency department with a chief complaint of chest and abdominal pain.  The patient states that she has pain that radiates between her shoulder blades and is not able to eat anything due to her symptoms.  Patient arrived via EMS, she received 4 mg of Zofran from them.  She also took oral oxycodone at home before coming here.     Per chart review, the patient was seen in the [facility] emergency department last night and had a CT of the abdomen and pelvis, right upper quadrant ultrasound, and a HIDA scan performed.  CT and ultrasound were unremarkable, HIDA scan did show biliary dyskinesia without any evidence of acute cholecystitis.  The patient was referred for follow-up with GI and surgery.    The patient is frustrated because she was discharged without this issue being resolved.  Her pain is not controlled and she states she is not able to drive or eat due to her pain.    I have reviewed the Medications, Allergies, Past Medical and Surgical History, and Social History in the Epic system.    HIDA scan  IMPRESSION:  1. The gallbladder fills with radiotracer and there is therefore no scintigraphic evidence of acute cholecystitis.  2. Low gallbladder ejection fraction of 7%. In the appropriate clinical setting, this is suggestive of biliary dyskinesia/dysfunction.    RUQ US  IMPRESSION:  1.  No gallstones. Negative sonographic Mandel's sign.  2.  Fatty liver.    CT abdomen/CTA chest PE  IMPRESSION:  1.  No evidence for pulmonary embolism, acute aortic abnormality, or  acute airspace disease.  2.  Indeterminate hypoenhancement along the right hepatic dome  compared to recent imaging but this appears new since 2021.  As such  this is considered indeterminate. A neoplastic etiology is not  entirely excluded. Recommend correlation with nonurgent hepatic  specific MRI.    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      Anxiety      Basal cell carcinoma      Cervical high risk HPV (human papillomavirus) test positive 2019    See problem list     Colitis      Depression      Diabetes mellitus, iatrogenic (H) 2020     Esophageal reflux      Inflammatory arthritis      Insomnia      Intestinal giardiasis 2018     Lumbago     left lower back pain     Lymphedema      Malignant melanoma (H)      Melanoma (H) 10/23/2017     Migraines      Mild persistent asthma      Morbid obesity with BMI of 40.0-44.9, adult (H)      STORMY (obstructive sleep apnea)      Prothrombin deficiency (H)     takes 81mg asa daily     Stroke (cerebrum) (H)     During      TIA (transient ischemic attack)      Type 2 diabetes mellitus (H)      Past Surgical History:   Procedure Laterality Date     APPENDECTOMY       COLONOSCOPY N/A 10/18/2017    Procedure: COLONOSCOPY;  Colon;  Surgeon: Debbie Stephens MD;  [facility]: [facility]     COLONOSCOPY N/A 2018    Procedure: COMBINED COLONOSCOPY, SINGLE OR MULTIPLE BIOPSY/POLYPECTOMY BY BIOPSY;  colon;  Surgeon: Benita Schumacher MD;  Location: [facility] GI     COLONOSCOPY      multiple since 2018 to present - about 6 total     DISSECT LYMPH NODE AXILLA Left 10/23/2017    Procedure: DISSECT LYMPH NODE AXILLA;  Left Axillary Lymph Node Dissection ;  Surgeon: Laurent Cool MD;  Location: [facility] OR     EXAM UNDER ANESTHESIA PELVIC N/A 2020    Procedure: EXAM UNDER ANESTHESIA, PELVIS; with Cervical Biopsies, Vaginal Biopsy and Endocervical Curettings;  Surgeon: Melina Jung MD;  Location: [facility] OR     GYN SURGERY  ,          LAPAROSCOPIC ASSISTED COLECTOMY N/A 06/15/2021    Procedure: laparoscopic total  abdominal colectomy, end ileostomy;  Surgeon: Quinton Ram MD;  Location: [facility] OR     REPAIR MOHS Left 07/25/2017    Procedure: REPAIR MOHS;  Left Upper Lid Moh's Reconstruction;  Surgeon: Kisha Bosch MD;  Location:  OR     No current facility-administered medications for this encounter.     Current Outpatient Medications   Medication     acetaminophen (TYLENOL) 325 MG tablet     albuterol (PROAIR HFA/PROVENTIL HFA/VENTOLIN HFA) 108 (90 Base) MCG/ACT inhaler     Calcium Carb-Cholecalciferol 600-800 MG-UNIT TABS     calcium carbonate (TUMS) 500 MG chewable tablet     Cholecalciferol (VITAMIN D3) 25 MCG TABS     cyanocobalamin (VITAMIN B-12) 1000 MCG tablet     gabapentin (NEURONTIN) 600 MG tablet     hydrOXYzine (ATARAX) 25 MG tablet     loperamide (IMODIUM A-D) 2 MG tablet     loperamide (IMODIUM A-D) 2 MG tablet     medical cannabis (Patient's own supply)     metFORMIN (GLUCOPHAGE) 500 MG tablet     ondansetron (ZOFRAN ODT) 4 MG ODT tab     Ostomy Supplies MISC     oxyCODONE-acetaminophen (PERCOCET) 5-325 MG tablet     pantoprazole (PROTONIX) 40 MG EC tablet     predniSONE (DELTASONE) 1 MG tablet     simethicone (MYLICON) 80 MG chewable tablet     Tirzepatide (MOUNJARO) 2.5 MG/0.5ML SOPN     venlafaxine (EFFEXOR) 75 MG tablet     VITRON-C  MG TABS tablet     Facility-Administered Medications Ordered in Other Encounters   Medication     lidocaine 1 % 9 mL     sodium bicarbonate 8.4 % injection 1 mEq     Allergies   Allergen Reactions     Bee Venom Swelling     Azithromycin Diarrhea     Erythromycin      Other reaction(s): GI intolerance, Vomiting     Fentanyl Other (See Comments)     sweating  sweating     Prochlorperazine Fatigue     Other reaction(s): Other (see comments)  Fatigue     Buspirone      Other reaction(s): GI intolerance  vomiting     Erythrocin Nausea and Vomiting     Topamax [Topiramate]      Made her lethargic     Zithromax [Azithromycin Dihydrate] Diarrhea     Enbrel  "[Etanercept] Hives and Rash     Past medical history, past surgical history, medications, and allergies were reviewed with the patient. Additional pertinent items: None    Social History     Socioeconomic History     Marital status:      Spouse name: Not on file     Number of children: Not on file     Years of education: Not on file     Highest education level: Not on file   Occupational History     Not on file   Tobacco Use     Smoking status: Former     Packs/day: 1.00     Years: 5.00     Pack years: 5.00     Types: Cigarettes     Quit date: 3/20/1998     Years since quittin.8     Smokeless tobacco: Never   Vaping Use     Vaping Use: Never used   Substance and Sexual Activity     Alcohol use: Not Currently     Drug use: Yes     Types: Marijuana     Sexual activity: Not Currently     Partners: Male     Birth control/protection: Surgical   Other Topics Concern     Parent/sibling w/ CABG, MI or angioplasty before 65F 55M? No   Social History Narrative    19 y.o- patient's mother   of lung cancer. She had to take care of her younger sister.    2012- patient's  had a heart attack with stents placed, followed by cardiac rehabilitation    2000 TO 2012  was in [facility] for depression    2013 patient's  went through alcohol rehabilitation at [place] inpatient            They have attended couple counseling a couple of times and patient went to the family program for chemical dependency.    Patient denies alcohol or drug use and herself            Has 2 children, girls ages 17 and 14. Oldest has been a \"trouble maker.\"     For a while she was working 3 jobs since her  was ill. Works at the [employer] for [employer]. Reports her job is very stressful.         Social Determinants of Health     Financial Resource Strain: High Risk     Difficulty of Paying Living Expenses: Very hard   Food Insecurity: No Food " "Insecurity     Worried About Running Out of Food in the Last Year: Never true     Ran Out of Food in the Last Year: Never true   Transportation Needs: No Transportation Needs     Lack of Transportation (Medical): No     Lack of Transportation (Non-Medical): No   Physical Activity: Not on file   Stress: Not on file   Social Connections: Not on file   Intimate Partner Violence: Unknown     Fear of Current or Ex-Partner: Not asked     Emotionally Abused: Not asked     Physically Abused: Not asked     Sexually Abused: Not asked   Housing Stability: Not on file     Social history was reviewed with the patient. Additional pertinent items: None    Review of Systems  A medically appropriate review of systems was performed with pertinent positives and negatives noted in the HPI, and all other systems negative.    Physical Exam   BP: 128/87  Pulse: 86  Temp: 98.3  F (36.8  C)  Resp: 16  Height: 160 cm (5' 3\")  Weight: 99.3 kg (219 lb)  SpO2: 98 %      General: Well nourished, well developed, patient appears to be in moderate distress secondary to pain  HEENT: EOMI, anicteric. NCAT, MMM  Neck: no jugular venous distension, supple, nl ROM  Cardiac: Regular rate and rhythm. No murmurs, rubs, or gallops. Normal S1, S2.  Intact peripheral pulses  Pulm: CTAB, no stridor, wheezes, rales, rhonchi  Abd: Soft, tenderness to palpation in the upper abdomen without rebound or guarding, nondistended.  No masses palpated.    Skin: Warm and dry to the touch.  No rash  Extremities: No LE edema, no cyanosis, w/w/p  Neuro: A&Ox3, no gross focal deficits    ED Course        Procedures              EKG Interpretation:      Interpreted by Polly Barros MD  Time reviewed: 1804  Symptoms at time of EKG: chest painChest pain   Rhythm: normal sinus   Rate: normal, 67 bpm, 67 bpm  Axis: NORMAL  Ectopy: none  Conduction: normal  ST Segments/ T Waves: No ST-T wave changes  Q Waves: none  Comparison to prior: Unchanged from EKG from " yesterday    Clinical Impression: normal EKG                          Labs Ordered and Resulted from Time of ED Arrival to Time of ED Departure   COMPREHENSIVE METABOLIC PANEL - Abnormal       Result Value    Sodium 139      Potassium 4.0      Chloride 103      Carbon Dioxide (CO2) 22      Anion Gap 14      Urea Nitrogen 9.6      Creatinine 0.80      Calcium 9.7      Glucose 120 (*)     Alkaline Phosphatase 91      AST 26      ALT 34      Protein Total 7.2      Albumin 4.1      Bilirubin Total 0.2      GFR Estimate >90     CBC WITH PLATELETS AND DIFFERENTIAL - Abnormal    WBC Count 10.3      RBC Count 4.61      Hemoglobin 12.7      Hematocrit 40.7      MCV 88      MCH 27.5      MCHC 31.2 (*)     RDW 15.0      Platelet Count 448      % Neutrophils 69      % Lymphocytes 20      % Monocytes 6      % Eosinophils 3      % Basophils 1      % Immature Granulocytes 1      NRBCs per 100 WBC 0      Absolute Neutrophils 7.2      Absolute Lymphocytes 2.1      Absolute Monocytes 0.6      Absolute Eosinophils 0.3      Absolute Basophils 0.1      Absolute Immature Granulocytes 0.1      Absolute NRBCs 0.0     LIPASE - Normal    Lipase 19     TROPONIN T, HIGH SENSITIVITY - Normal    Troponin T, High Sensitivity <6     ROUTINE [facility] WITH MICROSCOPIC REFLEX TO CULTURE   HELICOBACTER PYLORI ANTIGEN STOOL            Results for orders placed or performed during the hospital encounter of 01/11/23 (from the past 24 hour(s))   CBC with platelets differential    Narrative    The following orders were created for panel order CBC with platelets differential.  Procedure                               Abnormality         Status                     ---------                               -----------         ------                     CBC with platelets and d...[[id number]]  Abnormal            Final result                 Please view results for these tests on the individual orders.   Comprehensive metabolic panel   Result Value Ref Range    Sodium  139 136 - 145 mmol/L    Potassium 4.0 3.4 - 5.3 mmol/L    Chloride 103 98 - 107 mmol/L    Carbon Dioxide (CO2) 22 22 - 29 mmol/L    Anion Gap 14 7 - 15 mmol/L    Urea Nitrogen 9.6 6.0 - 20.0 mg/dL    Creatinine 0.80 0.51 - 0.95 mg/dL    Calcium 9.7 8.6 - 10.0 mg/dL    Glucose 120 (H) 70 - 99 mg/dL    Alkaline Phosphatase 91 35 - 104 U/L    AST 26 10 - 35 U/L    ALT 34 10 - 35 U/L    Protein Total 7.2 6.4 - 8.3 g/dL    Albumin 4.1 3.5 - 5.2 g/dL    Bilirubin Total 0.2 <=1.2 mg/dL    GFR Estimate >90 >60 mL/min/1.73m2   Lipase   Result Value Ref Range    Lipase 19 13 - 60 U/L   Troponin T, High Sensitivity   Result Value Ref Range    Troponin T, High Sensitivity <6 <=14 ng/L   [place] Draw    Narrative    The following orders were created for panel order [place] Draw.  Procedure                               Abnormality         Status                     ---------                               -----------         ------                     Extra Blue Top Tube[[id number]]                              Final result               Extra Red Top Tube[[id number]]                               Final result               Extra Green Top (Lithium...[[id number]]                                                 Extra Purple Top Tube[[id number]]                                                         Please view results for these tests on the individual orders.   CBC with platelets and differential   Result Value Ref Range    WBC Count 10.3 4.0 - 11.0 10e3/uL    RBC Count 4.61 3.80 - 5.20 10e6/uL    Hemoglobin 12.7 11.7 - 15.7 g/dL    Hematocrit 40.7 35.0 - 47.0 %    MCV 88 78 - 100 fL    MCH 27.5 26.5 - 33.0 pg    MCHC 31.2 (L) 31.5 - 36.5 g/dL    RDW 15.0 10.0 - 15.0 %    Platelet Count 448 150 - 450 10e3/uL    % Neutrophils 69 %    % Lymphocytes 20 %    % Monocytes 6 %    % Eosinophils 3 %    % Basophils 1 %    % Immature Granulocytes 1 %    NRBCs per 100 WBC 0 <1 /100    Absolute Neutrophils 7.2 1.6 - 8.3 10e3/uL    Absolute  Lymphocytes 2.1 0.8 - 5.3 10e3/uL    Absolute Monocytes 0.6 0.0 - 1.3 10e3/uL    Absolute Eosinophils 0.3 0.0 - 0.7 10e3/uL    Absolute Basophils 0.1 0.0 - 0.2 10e3/uL    Absolute Immature Granulocytes 0.1 <=0.4 10e3/uL    Absolute NRBCs 0.0 10e3/uL   Extra Blue Top Tube   Result Value Ref Range    Hold Specimen JIC    Extra Red Top Tube   Result Value Ref Range    Hold Specimen JIC      *Note: Due to a large number of results and/or encounters for the requested time period, some results have not been displayed. A complete set of results can be found in Results Review.       Labs, vital signs, and imaging studies were reviewed by me.    Medications   0.9% sodium chloride BOLUS (1,000 mLs Intravenous New Bag 1/11/23 1213)     Followed by   sodium chloride 0.9% infusion (has no administration in time range)   ondansetron (ZOFRAN) injection 4 mg (4 mg Intravenous Given 1/11/23 1215)   lidocaine (viscous) (XYLOCAINE) 2 % 15 mL, alum & mag hydroxide-simethicone (MAALOX) 15 mL GI Cocktail (has no administration in time range)   pantoprazole (PROTONIX) EC tablet 40 mg (has no administration in time range)   oxyCODONE (ROXICODONE) tablet 5 mg (5 mg Oral Given 1/11/23 1141)       Assessments & Plan (with Medical Decision Making)   Doretha Yu is a 47 year old female who presents to the emergency department with chest and abdominal pain.  Symptoms likely secondary to biliary dyskinesia identified on HIDA scan last night.  Differential diagnosis would also include ACS, anxiety, gastritis.  Labs, EKG ordered to further evaluate the patient.    Laboratory work-up is remarkable for normal troponin, normal white blood cell count    1053 patient was discussed with general surgery, they will come evaluate the patient in the emergency department    Patient was discussed with general surgery, they have seen the patient in the emergency department and you are believe that her symptoms are consistent with gallbladder as the etiology  for her pain, particularly due to the constant nature of her pain.  They recommend nonemergent cholecystectomy at time of ileostomy takedown, in part because cholecystectomy would be extremely technically challenging with ileostomy still in place due to its location and would be much easier/safer for the patient to perform when/after ileostomy is taken down.  No need for emergent surgery at this time.    Patient was discussed with gastroenterology, patient may benefit from a ventral upper endoscopy, though this also does not need to be emergent.  They recommend H. pylori testing, starting the patient on a twice daily PPI (patient is currently on daily pantoprazole), and trying a GI cocktail for the pain.    Medical Decision Making  The patient presented with a problem that is a chronic illness mild to moderate exacerbation, progression, or side effect of treatment.    The patient's evaluation involved:  review of 3+ prior external note(s) (see separate area of note for details)  ordering and review of 3+ test(s) (see separate area of note for details)  review of 3+ test result(s) ordered prior to this encounter (see separate area of note for details)  independent interpretation of testing performed by another health professional (imaging)  discussion of management or test interpretation with another health professional (see separate area of note for details)    The patient's management involved a parenteral controlled substance and a decision regarding hospitalization.    Patient was discussed with ED observation for possible admission for pain control, they state that the patient would not fit their protocols and recommend admission to internal medicine.    I have reviewed the nursing notes.    I have reviewed the findings, diagnosis, plan and need for follow up with the patient.    Patient to be admitted to internal medicine service for further management. Plan was discussed with patient who understands and agrees  with plan    New Prescriptions    No medications on file       Final diagnoses:   Biliary dyskinesia   Acute chest pain   Abdominal pain, epigastric       Polly Barros MD  1/11/2023   [facility] EMERGENCY DEPARTMENT     Polly Barros MD  01/11/23 0122       Polly Barros MD  01/11/23 0948

## 2023-01-11 NOTE — TELEPHONE ENCOUNTER
[facility] Call Center    Phone Message    May a detailed message be left on voicemail: yes     Reason for Call: Symptoms or Concerns     If patient has red-flag symptoms, warm transfer to triage line    Current symptom or concern: Gallbladder isn't working, chest pains, back pains, severe headache    Symptoms have been present for: unknown (Patient was crying and upset.)    Has patient previously been seen for this? Yes    By : Dr. Clifford Soliz - [facility]    Date: 1/10/23    Are there any new or worsening symptoms? Yes: Severe headache      Action Taken: Message routed to:  [facility]    Travel Screening: Not Applicable

## 2023-01-11 NOTE — ED PROVIDER NOTES
"     Emergency Department Patient Sign-out       Brief HPI:  This is a 47 year old female signed out to me by Dr. Mcfarland at the end of his shift at 5 PM.  See initial ED Provider note for details of the presentation.     Significant Events prior to my assuming care: Unremarkable laboratory evaluation; imaging evaluation was performed, CT chest/abdomen/pelvis with IV contrast (PE protocol) and right upper quadrant ultrasound evaluation were performed with no clear cause of her pain identified. Dr. Mcfarland has ordered a HIDA scan, to be performed later this evening.  Incidental CT finding of an indeterminate hypoenhancement along the right hepatic dome  compared to recent imaging but this appears new since 2021. Indeterminate. A neoplastic etiology is not entirely excluded. Recommend correlation with nonurgent hepatic MRI.       Exam:   Patient Vitals for the past 24 hrs:   BP Temp Temp src Pulse Resp SpO2 Height Weight   01/11/23 0200 105/69 -- -- 89 -- 91 % -- --   01/11/23 0100 (!) 133/119 -- -- 84 -- 93 % -- --   01/11/23 0030 118/79 -- -- 79 -- 94 % -- --   01/11/23 0000 107/68 -- -- 78 -- 93 % -- --   01/10/23 2000 -- -- -- 71 12 -- -- --   01/10/23 1930 -- -- -- 70 12 -- -- --   01/10/23 1800 121/86 -- -- 93 18 93 % -- --   01/10/23 1700 122/82 -- -- 75 -- 93 % -- --   01/10/23 1600 136/86 -- -- 84 -- 96 % -- --   01/10/23 1500 125/83 -- -- 78 -- 90 % -- --   01/10/23 1400 (!) 135/91 -- -- 85 -- 97 % -- --   01/10/23 1300 (!) 138/90 -- -- 78 14 92 % -- --   01/10/23 1200 118/76 -- -- 78 15 91 % -- --   01/10/23 1151 (!) 128/96 -- -- 85 24 96 % -- --   01/10/23 1100 (!) 138/92 -- -- 75 14 95 % -- --   01/10/23 0932 134/87 97.6  F (36.4  C) Tympanic 83 18 100 % 1.6 m (5' 3\") 99.8 kg (220 lb)           ED RESULTS:   Results for orders placed or performed during the hospital encounter of 01/10/23 (from the past 24 hour(s))   [place] Draw     Status: None    Collection Time: 01/10/23 10:50 AM    Narrative    The " following orders were created for panel order [place] Draw.  Procedure                               Abnormality         Status                     ---------                               -----------         ------                     Extra Blue Top Tube[[id number]]                              Final result               Extra Red Top Tube[[id number]]                               Final result                 Please view results for these tests on the individual orders.   CBC with Platelets & Differential     Status: None    Collection Time: 01/10/23 10:50 AM    Narrative    The following orders were created for panel order CBC with Platelets & Differential.  Procedure                               Abnormality         Status                     ---------                               -----------         ------                     CBC with platelets and d...[[id number]]                      Final result                 Please view results for these tests on the individual orders.   Basic metabolic panel     Status: Abnormal    Collection Time: 01/10/23 10:50 AM   Result Value Ref Range    Sodium 140 136 - 145 mmol/L    Potassium 4.3 3.4 - 5.3 mmol/L    Chloride 104 98 - 107 mmol/L    Carbon Dioxide (CO2) 24 22 - 29 mmol/L    Anion Gap 12 7 - 15 mmol/L    Urea Nitrogen 10.5 6.0 - 20.0 mg/dL    Creatinine 0.68 0.51 - 0.95 mg/dL    Calcium 9.6 8.6 - 10.0 mg/dL    Glucose 118 (H) 70 - 99 mg/dL    GFR Estimate >90 >60 mL/min/1.73m2   Troponin T, High Sensitivity     Status: Normal    Collection Time: 01/10/23 10:50 AM   Result Value Ref Range    Troponin T, High Sensitivity 6 <=14 ng/L   Extra Blue Top Tube     Status: None    Collection Time: 01/10/23 10:50 AM   Result Value Ref Range    Hold Specimen JIC    Extra Red Top Tube     Status: None    Collection Time: 01/10/23 10:50 AM   Result Value Ref Range    Hold Specimen JIC    CBC with platelets and differential     Status: None    Collection Time: 01/10/23 10:50 AM    Result Value Ref Range    WBC Count 10.2 4.0 - 11.0 10e3/uL    RBC Count 4.27 3.80 - 5.20 10e6/uL    Hemoglobin 11.9 11.7 - 15.7 g/dL    Hematocrit 36.3 35.0 - 47.0 %    MCV 85 78 - 100 fL    MCH 27.9 26.5 - 33.0 pg    MCHC 32.8 31.5 - 36.5 g/dL    RDW 14.7 10.0 - 15.0 %    Platelet Count 392 150 - 450 10e3/uL    % Neutrophils 64 %    % Lymphocytes 24 %    % Monocytes 6 %    % Eosinophils 4 %    % Basophils 1 %    % Immature Granulocytes 1 %    NRBCs per 100 WBC 0 <1 /100    Absolute Neutrophils 6.6 1.6 - 8.3 10e3/uL    Absolute Lymphocytes 2.4 0.8 - 5.3 10e3/uL    Absolute Monocytes 0.6 0.0 - 1.3 10e3/uL    Absolute Eosinophils 0.4 0.0 - 0.7 10e3/uL    Absolute Basophils 0.1 0.0 - 0.2 10e3/uL    Absolute Immature Granulocytes 0.1 <=0.4 10e3/uL    Absolute NRBCs 0.0 10e3/uL   Hepatic panel     Status: Normal    Collection Time: 01/10/23 10:50 AM   Result Value Ref Range    Protein Total 6.9 6.4 - 8.3 g/dL    Albumin 3.8 3.5 - 5.2 g/dL    Bilirubin Total <0.2 <=1.2 mg/dL    Alkaline Phosphatase 89 35 - 104 U/L    AST 25 10 - 35 U/L    ALT 33 10 - 35 U/L    Bilirubin Direct <0.20 0.00 - 0.30 mg/dL   Lipase     Status: Normal    Collection Time: 01/10/23 10:50 AM   Result Value Ref Range    Lipase 21 13 - 60 U/L   CT Chest (PE) Abdomen Pelvis w Contrast     Status: None    Collection Time: 01/10/23 12:31 PM    Narrative    CT CHEST PE, ABDOMEN & PELVIS WITH CONTRAST January 10, 2023 12:31  PM    CLINICAL HISTORY: Central pain radiating interscapular. History of  metastatic melanoma. Ulcerative colitis status post colectomy. Rapid  weight loss.    TECHNIQUE: CT angiogram chest and routine CT abdomen pelvis with IV  contrast. Arterial phase through the chest and venous phase through  the abdomen and pelvis. 2D and 3D MIP reconstructions were performed  by the CT technologist. Dose reduction techniques were used.  CONTRAST: 89 mL Isovue 370.    COMPARISON: CT abdomen and pelvis 11/11/2022, CT chest, abdomen and  pelvis  1/7/2022.    FINDINGS:  ANGIOGRAM CHEST: Pulmonary arteries are normal caliber and negative  for pulmonary emboli. Thoracic aorta is negative for dissection. No CT  evidence of right heart strain.     LUNGS AND PLEURA: No effusion. No acute airspace disease. Minor  bibasilar atelectasis. No acute airspace disease.    MEDIASTINUM/AXILLAE: No acute abnormality. No suspicious lymph nodes.    CORONARY ARTERY CALCIFICATION: None.    HEPATOBILIARY: There is hypoenhancing nodular and linear region at the  periphery of the right liver, for example segment 7 measuring 2.2 cm  series 10 image 28. This shows stability in the short interval. This  is clearly demonstrated on an older CT from 6/3/2021. A portion of  this is linear extending superiorly, for example series 10 images 17  through 24. No acute hepatic abnormality. Gallbladder unremarkable.    PANCREAS: Atrophic body and tail. No acute abnormality.    SPLEEN: Normal.    ADRENAL GLANDS: Normal.    KIDNEYS/BLADDER: Normal.    BOWEL: Subtotal colectomy and right abdominal ileostomy appears  stable. Stable parastomal fat-containing hernia. Stable Aureliano  pouch. No acute inflammatory change of the bowel is seen. No abscess  or free air.    LYMPH NODES: Normal.    PELVIC ORGANS: No acute abnormality.    OTHER: None.    MUSCULOSKELETAL: No convincing aggressive or acute osseous  abnormality.      Impression    IMPRESSION:  1.  No evidence for pulmonary embolism, acute aortic abnormality, or  acute airspace disease.  2.  Indeterminate hypoenhancement along the right hepatic dome  compared to recent imaging but this appears new since 2021. As such  this is considered indeterminate. A neoplastic etiology is not  entirely excluded. Recommend correlation with nonurgent hepatic  specific MRI.    BHAVIN GARCIA MD         SYSTEM ID:  [id number]   US Abdomen Limited (RUQ)     Status: None    Collection Time: 01/10/23  2:22 PM    Narrative    ULTRASOUND ABDOMEN LIMITED January 10, 2023  2:22 PM    CLINICAL HISTORY: Right upper quadrant pain.    TECHNIQUE: Limited abdominal ultrasound.    COMPARISON: CT chest, abdomen and pelvis 1/10/2023.    FINDINGS:    GALLBLADDER: The gallbladder is normal. No gallstones, wall  thickening, or pericholecystic fluid. Negative sonographic Mandel's  sign.    BILE DUCTS: There is no biliary dilatation. The common duct measures 4  mm.    LIVER: Fatty liver. Liver is 17.7 cm. No visible focal hepatic lesion.  The abnormality at CT is unable to be seen.    RIGHT KIDNEY: No hydronephrosis.    PANCREAS: The visualized portions of the pancreas are normal.    No ascites.      Impression    IMPRESSION:  1.  No gallstones. Negative sonographic Mandel's sign.  2.  Fatty liver.    BHAVIN GARCIA MD         SYSTEM ID:  [id number]   NM Hepatobiliary Scan w GB EF     Status: None    Collection Time: 01/10/23  9:58 PM    Narrative    EXAM: NUCLEAR MEDICINE HEPATOBILIARY SCAN WITH GALLBLADDER EJECTION FRACTION  LOCATION: [facility]  DATE/TIME: 1/10/2023 9:58 PM    INDICATION: Right upper quadrant abdominal pain.  COMPARISON: 11/11/2022 - CT abdomen and pelvis.  TECHNIQUE: 6.8 mCi of technetium-99m mebrofenin, IV. Anterior planar imaging of the abdomen. 2.0 mcg of cholecystokinin analog, IV. Gallbladder imaging for 30-60 minutes.    FINDINGS: Normal radiotracer uptake by the liver and excretion into the intrahepatic bile ducts, common bile duct and duodenum. The gallbladder fills with radiotracer. On the post-cholecystokinin images, the gallbladder contracts with a measured ejection   fraction of 7% (Normal range = 35% or greater).      Impression    IMPRESSION:  1. The gallbladder fills with radiotracer and there is therefore no scintigraphic evidence of acute cholecystitis.  2. Low gallbladder ejection fraction of 7%. In the appropriate clinical setting, this is suggestive of biliary dyskinesia/dysfunction.       ED MEDICATIONS:   Medications   HYDROmorphone  (PF) (DILAUDID) injection 0.5 mg (0.5 mg Intravenous Given 1/10/23 2233)   HYDROmorphone (DILAUDID) injection 1 mg (1 mg Intravenous Given 1/10/23 1151)   iopamidol (ISOVUE-370) solution 89 mL (89 mLs Intravenous Given 1/10/23 1208)   sodium chloride 0.9 % bag 500mL for CT scan flush use (100 mLs Intravenous Given 1/10/23 1209)   ondansetron (ZOFRAN) injection 4 mg (4 mg Intravenous Given 1/10/23 1427)   famotidine (PEPCID) injection 20 mg (20 mg Intravenous Given 1/10/23 2230)   ketorolac (TORADOL) injection 15 mg (15 mg Intravenous Given 1/10/23 1838)   technetium Tc 99m mebrofenin (CHOLETEC) radioisotope injection 6.8 millicurie (6.8 millicuries Intravenous Given 1/10/23 2020)   sincalide (KINEVAC) 2 mcg in sodium chloride 0.9 % 3 mL (2 mcg Intravenous Given 1/10/23 2116)   ondansetron (ZOFRAN) injection 4 mg (4 mg Intravenous Given 1/10/23 2248)       Impression:    ICD-10-CM    1. RUQ abdominal pain  R10.11 Adult General Surg Referral      2. Biliary dyskinesia  K82.8 Adult General Surg Referral      3. Acute chest pain  R07.9           Plan:    Pending studies: HIDA scan this evening.    Admit when a bed becomes available for her admission.    6:10 PM - I spoke with the nuclear medicine technician reports that the HIDA scan contrast will be brought up from the [facility] and the HIDA scan will be performed later this evening.  Nuclear medicine technician informing the patient she has no opiate analgesic within 4 hours of the study and needs to be NPO.  I will try Pepcid and Toradol for pain management.    6:15 PM - I updated Mrs. Yu of her pending HIDA scan, reexamined  her and answered her questions.  I informed her of the incidental CT finding of an indeterminate hypoenhancement along the right hepatic dome compared to recent imaging, new since 2021, indeterminate and a neoplastic etiology is not excluded. I informed her of the Radiologist recommendation for nonemergent/nonurgent hepatic MRI study and  she will pursue this with her primary care provider. She expressed understanding of these issues and expressed understanding of the recommendation for an outpatient hepatic MRI study arranged with her primary care provider on a nonemergent/nonurgent basis.    11:00 PM - Sleeping.  Still no beds available here for her admission and she continues to board in the ED.    12:10 AM - Reviewed results of her HIDA scan with her.  She has biliary dyskinesia but no evidence of cholecystitis.  She has been sleeping and appears stable for discharge home.  She reports she has appointments at [place] later today, including an appointment with her GI specialist at [place] later today.  She appears stable for discharge home with follow-up in GI, and referral to our surgery clinic.  I will make a surgery clinic referral for her and prescribe her a small number of Oxycodone/Percocet and Zofran to use if needed for pain and nausea.      Clifford Soliz MD  01/11/23 0350    ADDENDUM 1/11/23 2:03 PM  EMR review shows she did not go to her [place] appointments today and went to [facility] for these symptoms. Surgery consult was obtained:    [facility]  Consult Note - Emergency General Surgery Service  Date of Admission:  1/11/2023 (at 10:53 AM)  Consult Requested by: ED- Dr. Barros  Reason for Consult: Abdominal pain     Assessment & Plan: Surgery   Doretha Yu is a 47 year old female presenting to the ED on 1/11/2023 with chief complaint of chest pain and 3 months of nausea. Workup demonstrated no evidence of cholelithiasis or cholecystitis. HIDA did demonstrate a low GB EF of 7% (no pain during exam). Labs unremarkable and non-tender in the RUQ on exam. Presentation inconsistent with the episodic symptoms that are typically of biliary colic. Many alternative diagnoses to consider that are more likely given presentation such as: gastroparesis, GERD, gastritis, and cannabis induced  nausea. Finally, given the location of the ileostomy, it would be difficult to remove the gallbladder laparoscopically.There would be a much higher risk of needing to be done open which would carry significant morbidity. Taking on this risk is unreasonable in the setting of diagnostic uncertainty and we cannot be confident that it will improve her symptoms. This was discussed with the patient and she expressed understanding.  - No indication for surgery  - Keep follow up with Dr. Ram and GI. Could consider cholecystectomy at the time of ileostomy takedown.     Clifford Soliz MD  01/11/23 8864

## 2023-01-12 LAB
ALBUMIN SERPL BCG-MCNC: 3.5 G/DL (ref 3.5–5.2)
ALP SERPL-CCNC: 72 U/L (ref 35–104)
ALT SERPL W P-5'-P-CCNC: 27 U/L (ref 10–35)
ANION GAP SERPL CALCULATED.3IONS-SCNC: 12 MMOL/L (ref 7–15)
AST SERPL W P-5'-P-CCNC: 22 U/L (ref 10–35)
ATRIAL RATE - MUSE: 67 BPM
BASOPHILS # BLD AUTO: 0.1 10E3/UL (ref 0–0.2)
BASOPHILS NFR BLD AUTO: 1 %
BILIRUB SERPL-MCNC: <0.2 MG/DL
BUN SERPL-MCNC: 9.1 MG/DL (ref 6–20)
C DIFF TOX B STL QL: NEGATIVE
CALCIUM SERPL-MCNC: 8.6 MG/DL (ref 8.6–10)
CHLORIDE SERPL-SCNC: 108 MMOL/L (ref 98–107)
CREAT SERPL-MCNC: 0.73 MG/DL (ref 0.51–0.95)
DEPRECATED HCO3 PLAS-SCNC: 21 MMOL/L (ref 22–29)
DIASTOLIC BLOOD PRESSURE - MUSE: NORMAL MMHG
EOSINOPHIL # BLD AUTO: 0.3 10E3/UL (ref 0–0.7)
EOSINOPHIL NFR BLD AUTO: 4 %
ERYTHROCYTE [DISTWIDTH] IN BLOOD BY AUTOMATED COUNT: 14.8 % (ref 10–15)
GFR SERPL CREATININE-BSD FRML MDRD: >90 ML/MIN/1.73M2
GLUCOSE BLDC GLUCOMTR-MCNC: 111 MG/DL (ref 70–99)
GLUCOSE BLDC GLUCOMTR-MCNC: 115 MG/DL (ref 70–99)
GLUCOSE SERPL-MCNC: 110 MG/DL (ref 70–99)
H PYLORI AG STL QL IA: NEGATIVE
HCT VFR BLD AUTO: 34.7 % (ref 35–47)
HGB BLD-MCNC: 10.7 G/DL (ref 11.7–15.7)
IMM GRANULOCYTES # BLD: 0.1 10E3/UL
IMM GRANULOCYTES NFR BLD: 1 %
INR PPP: 0.95 (ref 0.85–1.15)
INTERPRETATION ECG - MUSE: NORMAL
LYMPHOCYTES # BLD AUTO: 1.8 10E3/UL (ref 0.8–5.3)
LYMPHOCYTES NFR BLD AUTO: 26 %
MCH RBC QN AUTO: 27.3 PG (ref 26.5–33)
MCHC RBC AUTO-ENTMCNC: 30.8 G/DL (ref 31.5–36.5)
MCV RBC AUTO: 89 FL (ref 78–100)
MONOCYTES # BLD AUTO: 0.5 10E3/UL (ref 0–1.3)
MONOCYTES NFR BLD AUTO: 7 %
NEUTROPHILS # BLD AUTO: 4.5 10E3/UL (ref 1.6–8.3)
NEUTROPHILS NFR BLD AUTO: 61 %
NRBC # BLD AUTO: 0 10E3/UL
NRBC BLD AUTO-RTO: 0 /100
P AXIS - MUSE: 53 DEGREES
PLATELET # BLD AUTO: 354 10E3/UL (ref 150–450)
POTASSIUM SERPL-SCNC: 4 MMOL/L (ref 3.4–5.3)
PR INTERVAL - MUSE: 140 MS
PROT SERPL-MCNC: 6 G/DL (ref 6.4–8.3)
QRS DURATION - MUSE: 88 MS
QT - MUSE: 430 MS
QTC - MUSE: 454 MS
R AXIS - MUSE: 42 DEGREES
RBC # BLD AUTO: 3.92 10E6/UL (ref 3.8–5.2)
SODIUM SERPL-SCNC: 141 MMOL/L (ref 136–145)
SYSTOLIC BLOOD PRESSURE - MUSE: NORMAL MMHG
T AXIS - MUSE: 50 DEGREES
VENTRICULAR RATE- MUSE: 67 BPM
WBC # BLD AUTO: 7.2 10E3/UL (ref 4–11)

## 2023-01-12 PROCEDURE — 82962 GLUCOSE BLOOD TEST: CPT

## 2023-01-12 PROCEDURE — 99233 SBSQ HOSP IP/OBS HIGH 50: CPT | Mod: GC | Performed by: PEDIATRICS

## 2023-01-12 PROCEDURE — 87493 C DIFF AMPLIFIED PROBE: CPT

## 2023-01-12 PROCEDURE — 250N000009 HC RX 250: Performed by: STUDENT IN AN ORGANIZED HEALTH CARE EDUCATION/TRAINING PROGRAM

## 2023-01-12 PROCEDURE — 250N000013 HC RX MED GY IP 250 OP 250 PS 637: Performed by: STUDENT IN AN ORGANIZED HEALTH CARE EDUCATION/TRAINING PROGRAM

## 2023-01-12 PROCEDURE — 258N000003 HC RX IP 258 OP 636: Performed by: STUDENT IN AN ORGANIZED HEALTH CARE EDUCATION/TRAINING PROGRAM

## 2023-01-12 PROCEDURE — C9113 INJ PANTOPRAZOLE SODIUM, VIA: HCPCS

## 2023-01-12 PROCEDURE — 258N000003 HC RX IP 258 OP 636: Performed by: EMERGENCY MEDICINE

## 2023-01-12 PROCEDURE — 85025 COMPLETE CBC W/AUTO DIFF WBC: CPT

## 2023-01-12 PROCEDURE — 99418 PROLNG IP/OBS E/M EA 15 MIN: CPT

## 2023-01-12 PROCEDURE — 250N000013 HC RX MED GY IP 250 OP 250 PS 637

## 2023-01-12 PROCEDURE — 99223 1ST HOSP IP/OBS HIGH 75: CPT

## 2023-01-12 PROCEDURE — G0378 HOSPITAL OBSERVATION PER HR: HCPCS

## 2023-01-12 PROCEDURE — 96376 TX/PRO/DX INJ SAME DRUG ADON: CPT

## 2023-01-12 PROCEDURE — 250N000011 HC RX IP 250 OP 636: Performed by: STUDENT IN AN ORGANIZED HEALTH CARE EDUCATION/TRAINING PROGRAM

## 2023-01-12 PROCEDURE — 80053 COMPREHEN METABOLIC PANEL: CPT

## 2023-01-12 PROCEDURE — 36415 COLL VENOUS BLD VENIPUNCTURE: CPT

## 2023-01-12 PROCEDURE — 85610 PROTHROMBIN TIME: CPT

## 2023-01-12 PROCEDURE — 96375 TX/PRO/DX INJ NEW DRUG ADDON: CPT

## 2023-01-12 PROCEDURE — 250N000012 HC RX MED GY IP 250 OP 636 PS 637

## 2023-01-12 PROCEDURE — 250N000011 HC RX IP 250 OP 636

## 2023-01-12 RX ORDER — HYDROMORPHONE HCL IN WATER/PF 6 MG/30 ML
0.2 PATIENT CONTROLLED ANALGESIA SYRINGE INTRAVENOUS EVERY 4 HOURS PRN
Status: DISCONTINUED | OUTPATIENT
Start: 2023-01-12 | End: 2023-01-12

## 2023-01-12 RX ORDER — NALOXONE HYDROCHLORIDE 0.4 MG/ML
0.4 INJECTION, SOLUTION INTRAMUSCULAR; INTRAVENOUS; SUBCUTANEOUS
Status: DISCONTINUED | OUTPATIENT
Start: 2023-01-12 | End: 2023-01-13 | Stop reason: HOSPADM

## 2023-01-12 RX ORDER — HYDROMORPHONE HCL IN WATER/PF 6 MG/30 ML
0.2 PATIENT CONTROLLED ANALGESIA SYRINGE INTRAVENOUS ONCE
Status: COMPLETED | OUTPATIENT
Start: 2023-01-12 | End: 2023-01-12

## 2023-01-12 RX ORDER — HYDROMORPHONE HCL IN WATER/PF 6 MG/30 ML
0.4 PATIENT CONTROLLED ANALGESIA SYRINGE INTRAVENOUS EVERY 4 HOURS PRN
Status: COMPLETED | OUTPATIENT
Start: 2023-01-12 | End: 2023-01-12

## 2023-01-12 RX ORDER — NALOXONE HYDROCHLORIDE 0.4 MG/ML
0.2 INJECTION, SOLUTION INTRAMUSCULAR; INTRAVENOUS; SUBCUTANEOUS
Status: DISCONTINUED | OUTPATIENT
Start: 2023-01-12 | End: 2023-01-13 | Stop reason: HOSPADM

## 2023-01-12 RX ADMIN — HYDROMORPHONE HYDROCHLORIDE 0.4 MG: 0.2 INJECTION, SOLUTION INTRAMUSCULAR; INTRAVENOUS; SUBCUTANEOUS at 14:55

## 2023-01-12 RX ADMIN — ONDANSETRON 4 MG: 2 INJECTION INTRAMUSCULAR; INTRAVENOUS at 05:42

## 2023-01-12 RX ADMIN — PANTOPRAZOLE SODIUM 40 MG: 40 INJECTION, POWDER, FOR SOLUTION INTRAVENOUS at 20:49

## 2023-01-12 RX ADMIN — VENLAFAXINE 150 MG: 75 TABLET ORAL at 20:53

## 2023-01-12 RX ADMIN — HYDROXYZINE HYDROCHLORIDE 25 MG: 25 TABLET, FILM COATED ORAL at 05:42

## 2023-01-12 RX ADMIN — ACETAMINOPHEN 975 MG: 325 TABLET, FILM COATED ORAL at 17:44

## 2023-01-12 RX ADMIN — HYDROMORPHONE HYDROCHLORIDE 0.2 MG: 0.2 INJECTION, SOLUTION INTRAMUSCULAR; INTRAVENOUS; SUBCUTANEOUS at 10:54

## 2023-01-12 RX ADMIN — METFORMIN HYDROCHLORIDE 1000 MG: 500 TABLET ORAL at 17:44

## 2023-01-12 RX ADMIN — ALUMINUM HYDROXIDE, MAGNESIUM HYDROXIDE, AND DIMETHICONE 15 ML: 200; 20; 200 SUSPENSION ORAL at 08:49

## 2023-01-12 RX ADMIN — GABAPENTIN 600 MG: 600 TABLET, FILM COATED ORAL at 08:26

## 2023-01-12 RX ADMIN — PREDNISONE 1 MG: 1 TABLET ORAL at 08:26

## 2023-01-12 RX ADMIN — Medication 1 TABLET: at 08:26

## 2023-01-12 RX ADMIN — ONDANSETRON 4 MG: 2 INJECTION INTRAMUSCULAR; INTRAVENOUS at 22:59

## 2023-01-12 RX ADMIN — ACETAMINOPHEN 975 MG: 325 TABLET, FILM COATED ORAL at 02:04

## 2023-01-12 RX ADMIN — SODIUM CHLORIDE: 9 INJECTION, SOLUTION INTRAVENOUS at 14:58

## 2023-01-12 RX ADMIN — SODIUM CHLORIDE 1000 ML: 9 INJECTION, SOLUTION INTRAVENOUS at 05:46

## 2023-01-12 RX ADMIN — ALUMINUM HYDROXIDE, MAGNESIUM HYDROXIDE, AND DIMETHICONE 15 ML: 200; 20; 200 SUSPENSION ORAL at 08:51

## 2023-01-12 RX ADMIN — VENLAFAXINE 150 MG: 75 TABLET ORAL at 08:26

## 2023-01-12 RX ADMIN — PANTOPRAZOLE SODIUM 40 MG: 40 INJECTION, POWDER, FOR SOLUTION INTRAVENOUS at 08:26

## 2023-01-12 RX ADMIN — HYDROMORPHONE HYDROCHLORIDE 0.2 MG: 0.2 INJECTION, SOLUTION INTRAMUSCULAR; INTRAVENOUS; SUBCUTANEOUS at 12:18

## 2023-01-12 RX ADMIN — SIMETHICONE 160 MG: 80 TABLET, CHEWABLE ORAL at 05:42

## 2023-01-12 RX ADMIN — METFORMIN HYDROCHLORIDE 1000 MG: 500 TABLET ORAL at 08:27

## 2023-01-12 RX ADMIN — HYDROMORPHONE HYDROCHLORIDE 0.4 MG: 0.2 INJECTION, SOLUTION INTRAMUSCULAR; INTRAVENOUS; SUBCUTANEOUS at 18:55

## 2023-01-12 RX ADMIN — ACETAMINOPHEN 975 MG: 325 TABLET, FILM COATED ORAL at 10:07

## 2023-01-12 ASSESSMENT — ACTIVITIES OF DAILY LIVING (ADL)
ADLS_ACUITY_SCORE: 20
DEPENDENT_IADLS:: CLEANING;TRANSPORTATION
ADLS_ACUITY_SCORE: 20

## 2023-01-12 NOTE — PROVIDER NOTIFICATION
Provider on call notified via pager # [phone]  with non radiating mid chest pain this morning and that pt was given Simethicone, Atarax and Zofran for nausea.

## 2023-01-12 NOTE — CONSULTS
Care Management Initial Consult    General Information  Assessment completed with: VM-chart review, Patient    Type of CM/SW Visit: Initial Assessment  Primary Care Provider verified and updated as needed: Yes   Readmission within the last 30 days: no previous admission in last 30 days   Reason for Consult: discharge planning  Advance Care Planning: Advance Care Planning Reviewed: present on chart, verified with patient        Communication Assessment  Patient's communication style: spoken language (English or Bilingual)    Hearing Difficulty or Deaf: no   Wear Glasses or Blind: no    Cognitive  Cognitive/Neuro/Behavioral: WDL                      Living Environment:   People in home: child(gold), adult     Current living Arrangements: house      Able to return to prior arrangements: yes     Family/Social Support:  Care provided by: self, child(gold)  Provides care for: no one  Marital Status:   Support System: Children          Description of Support System: Supportive, Involved    Support Assessment: Adequate family and caregiver support    Current Resources:   Patient receiving home care services: No  Community Resources: None  Equipment currently used at home: colostomy/ostomy supplies  Supplies currently used at home: Diabetic Supplies, Wound Care Supplies    Employment/Financial:  Employment Status: employed full-time     Financial Concerns: No concerns identified   Referral to Financial Worker: No     Lifestyle & Psychosocial Needs:  Social Determinants of Health     Tobacco Use: Medium Risk     Smoking Tobacco Use: Former     Smokeless Tobacco Use: Never     Passive Exposure: Not on file   Alcohol Use: Not on file   Financial Resource Strain: High Risk     Difficulty of Paying Living Expenses: Very hard   Food Insecurity: No Food Insecurity     Worried About Running Out of Food in the Last Year: Never true     Ran Out of Food in the Last Year: Never true   Transportation Needs: No Transportation Needs      Lack of Transportation (Medical): No     Lack of Transportation (Non-Medical): No   Physical Activity: Not on file   Stress: Not on file   Social Connections: Not on file   Intimate Partner Violence: Unknown     Fear of Current or Ex-Partner: Not asked     Emotionally Abused: Not asked     Physically Abused: Not asked     Sexually Abused: Not asked   Depression: At risk     PHQ-2 Score: 3   Housing Stability: Not on file     Functional Status:  Prior to admission patient needed assistance: yes  Dependent ADLs: Independent  Dependent IADLs: Cleaning, Transportation     Mental Health Status:  Mental Health Status: Current Concern     Mental Health Treatment: Medication    Chemical Dependency Status:  Chemical Dependency Status: No Current Concerns           Values/Beliefs:  Spiritual, Cultural Beliefs, [employer] Practices, Values that affect care: no         Additional Information:  Background: Per H&P, Doretha Yu is a 47 year old female with PMH significant for GERD, asthma, metastatic malignant melanoma, anxiety, obesity, STORMY, diabetes, UC s/p TAC with end ileostomy, and RA here for acute on chronic abdominal pain with nausea and new onset costochondritis    Progress:  met with patient at bedside to complete care management assessment. SW introduced self, the role of the medical social worker, and the nature of the care management assessment.     Pt comes from home where she lives in a house with her children. She is independent at baseline and employed full-time. She has no services or workers. She confirmed her health care directive on file is current, confirmed her PCP, and declined any spiritual or cultural needs that would impact her care. Her family will provide her a discharge ride. She denied any discharge needs at this time.     Pt informed of being placed on Outpatient Observation Status. Pt received information sheet and referred to call her insurance companies to discuss. Pt expressed  concern about discharging tomorrow without the doctors having addressed her concerns. SW paged M4 provider to request that they address the patient's concerns. SW offered to be present while the doctors discuss this with her, however the patient declined. Pt offered # to speak with Patient Relations to discuss any concerns, but declined.    Plan:  will follow this patient through discharge.  _____________________________     GEORGIE Valle, LICSW  Advanced Practice Independent Clinical   [facility]   [address] Medicine Beds 9871-9923 & 5C Medicine Overflow Beds 4788-3709  [email or web address]  Phone: [phone]  Pager: [phone]  Fax: [phone]  Message me on [employer].    Weekend 5A & 5B  (0800 - 1630) Pager: [phone]     Weekend 5A & 5B RNCC (2143-3521) Pager: [phone]     Weekdays after hours (1630 - 0000), Saturday & [place] after hours (1220-6667), & [facility] Recognized Holidays Coverage  Pager: [phone]

## 2023-01-12 NOTE — PROGRESS NOTES
[facility]    Progress Note - Medicine Service, JASMIN TEAM 4       Date of Admission:  1/11/2023    Assessment & Plan   Doretha Yu is a 47 year old female with PMH significant for GERD, asthma, metastatic malignant melanoma, anxiety, obesity, STORMY, diabetes, UC s/p TAC with end ileostomy, and RA here for acute on chronic abdominal pain with nausea and vomiting.     Today:   -GI consult, appreciate recs   -3x dose IV dilaudid to get ahead on pain control   -GI cocktail prn   -Health psych & psyhiatry consult for anxiety     #Acute on chronic abdominal pain   #Acute on chronic N/V    #Gastritis   #Cannabinoid Use   Presented with 3-4 month history of abdominal pain now with 72hrs of chest and back pain with exquisite tenderness on exam. GI cocktail in ED with some relieve. CT unremarkable for acute illness, however, shows possible mass in the liver. Malignancy cannot be ruled out. CMP unremarkable. Lipase negative. No vomiting.  No surgical intervention as per surgery due to ileostomy (see surgical note). No evidence of cholecystitis or cholelithiasis. CT at [facility] unremarkable. No leukocytosis. Will hold off on antibiotics given patient is stable, pain has improved with symptomatic relieve and likely is mostly due to medications. Recent EGD at [place] with gastritis and esophagitis w/o ulceration. Suspect majority of symptoms due to poorly controlled gastritis with contribution of significant anxiety. Also consider cannabis hyperemesis Will consult GI.   -GenSurg consulted, appreciate recs    -No surgery at this time  -H Pylori negative   -IV PPI BID  -Avoid GLP1 meds, opioids, cannabis   -GI consult, appreciate recs  -s/p 1L NS, continue mIVF at 75 ml/hr    Gallbladder dysfunction (EF 7%)   Negative sonographic russell sign at [facility]. Do not feel this is primary source of current symptoms. Per surgery will not proceed with cholecystectomy at this time due  to location of ileostomy   -GenSurg consulted, appreciate recs    -No surgery at this time    #UC s/p Ileostomy and TAC   Stoma pink and intact. Adequate output and transit time.   -CTM     #Non-cardiac Chest Pain   #Costochondritis  #Hx of malignant melanoma with no primary tumor identified    #Vertebral Tenderness   Normal troponin. EKG unremarkable. No history or evidence of trauma or recent surgical procedures. Exquisite tenderness over the chest and back, mostly on thoracic vertebra with concern for malignant process, although CT not evident. Patient has being diagnosed with RA and has being undergoing prednisone taper, which has worsen her symptoms of generalized pain. No diagnosis of fibromyalgia in the past. Likely chest wall pain from frequent emesis   -PRN Methocarbamol, Voltaren gel  -APAP for pain management   -Avoid opioids if able    - s/p 3x prn dilaudid 0.4 mg IV      #Asymptomatic Bacteiuria  Denies urinary symptoms. No treatment indicated at the moment  -CTM for symptoms.     #T2DM  -Hypoglycemia protocol and fingersticks ordered   -Continue PTA metformin  -Discontinue Mounjaro       #STORMY  CPAP ordered for hospital stay     #RA  Continue PTA prednisone 1mg daily      #Anxiety and Depression  -Continue PTA Venlefaxine   -Continue PTA Gabapentin        Diet: Combination Diet Regular Diet Adult; Moderate Consistent Carb (60 g CHO per Meal) Diet    DVT Prophylaxis: Pneumatic Compression Devices  Major Catheter: Not present  Fluids: 75 ml/hr   Lines: None     Cardiac Monitoring: None  Code Status: Full Code      Clinically Significant Risk Factors Present on Admission                    # Non-Invasive mechanical ventilation: current O2 Device: BiPAP/CPAP  # Acute hypoxic respiratory failure: continue supplemental O2 as needed    # DMII: A1C = 6.6 % (Ref range: 0.0 - 5.6 %) within past 3 months    # Severe Obesity: Estimated body mass index is 40.22 kg/m  as calculated from the following:    Height as of  "this encounter: 1.6 m (5' 3\").    Weight as of this encounter: 103 kg (227 lb 1.2 oz).           Disposition Plan      Expected Discharge Date: 01/12/2023                The patient's care was discussed with the Attending Physician, Dr. Lowell Pérez and Patient.    Daija Gary MD  Medicine Service, [address]  Securely message with [employer] (more info)  Text page via [employer]/Directory   See signed in provider for up to date coverage information  ______________________________________________________________________    Interval History   Admitted yesterday evening. Please see H&P for full details.     Overnight persistent epigastric and mid chest discomfort which patient describes as a pressure and sometimes burning. No sharp pains. Continues to have nausea. Reports quick transit of meals thorugh GI track to stoma. No fevers or chills. Does express she is very anxious regarding her condition and feels there is something wrong with her due to this new pain. Emotional supports provided.     Physical Exam   Vital Signs: Temp: 98  F (36.7  C) Temp src: Axillary BP: 116/74 Pulse: 81   Resp: 16 SpO2: 97 % O2 Device: BiPAP/CPAP    Weight: 227 lbs 1.18 oz    General: no acute distress, appears uncomfortable and anxious   HEENT: NC/AT, EOMI, PERRL, MMM   CVS: normal s1,s2; no murmurs. Warm well perfused   Resp: CTAB, no wheezes or crackles   Abd: Normal active bowel sounds. Soft/non-distended, tender to palpation in epigastrium. No rebound or guarding. Ileostomy in RUQ pink w/o surrounding erythema-output present in bag   Musc: no gross joint abnormalities, moves all extremities. Tenderness with palpation of mid back between scapula  Extremities: no peripheral edema bilaterally   Skin: no skin rashes   Neuro: alert and oriented x4, no gross neurological deficits    Data    Recent Labs   Lab 01/12/23  1721 01/12/23  1147 01/12/23  0736 01/11/23  1214 " 01/10/23  1050   WBC  --   --  7.2 10.3 10.2   HGB  --   --  10.7* 12.7 11.9   MCV  --   --  89 88 85   PLT  --   --  354 448 392   INR  --   --  0.95  --   --    NA  --   --  141 139 140   POTASSIUM  --   --  4.0 4.0 4.3   CHLORIDE  --   --  108* 103 104   CO2  --   --  21* 22 24   BUN  --   --  9.1 9.6 10.5   CR  --   --  0.73 0.80 0.68   ANIONGAP  --   --  12 14 12   PATRICIA  --   --  8.6 9.7 9.6   * 115* 110* 120* 118*   ALBUMIN  --   --  3.5 4.1 3.8   PROTTOTAL  --   --  6.0* 7.2 6.9   BILITOTAL  --   --  <0.2 0.2 <0.2   ALKPHOS  --   --  72 91 89   ALT  --   --  27 34 33   AST  --   --  22 26 25   LIPASE  --   --   --  19 21       Imaging results reviewed over the past 24 hrs:   No results found for this or any previous visit (from the past 24 hour(s)).

## 2023-01-12 NOTE — PLAN OF CARE
"Goal Outcome Evaluation:    Blood pressure 116/74, pulse 81, temperature 98  F (36.7  C), temperature source Axillary, resp. rate 16, height 1.6 m (5' 3\"), weight 103 kg (227 lb 1.2 oz), last menstrual period 01/03/2023, SpO2 97 %, not currently breastfeeding.    AVSS, A/O x 4. Pt was transferred from ED last evening with complain of chest and abdominal pain. She has ostomy bag and there is now small formed stool in the bag. She is Covid Negative and C-diff Negative. She slept good on hospital C-PAP at 21 % Fi02. She now off the C-PAP.She woke up this morning with another chest pressure non radiating and was given Simethicone, Atarax and Zofran for nausea. Provider on call notified and said to watch pt. Right PIV with NS 0.9/L going at 100 ML/hour. Continue to monitor pt and follow plan of care.        Problem: Plan of Care - These are the overarching goals to be used throughout the patient stay.    Goal: Plan of Care Review  Description: The Plan of Care Review/Shift note should be completed every shift.  The Outcome Evaluation is a brief statement about your assessment that the patient is improving, declining, or no change.  This information will be displayed automatically on your shift note.  Outcome: Progressing     Problem: Plan of Care - These are the overarching goals to be used throughout the patient stay.    Goal: Patient-Specific Goal (Individualized)  Description: You can add care plan individualizations to a care plan. Examples of Individualization might be:  \"Parent requests to be called daily at 9am for status\", \"I have a hard time hearing out of my right ear\", or \"Do not touch me to wake me up as it startles me\".  Outcome: Progressing  Flowsheets (Taken 1/11/2023 2209)  Individualized Care Needs: anxious  Anxieties, Fears or Concerns: yes \"a little bit\"     Problem: Plan of Care - These are the overarching goals to be used throughout the patient stay.    Goal: Absence of Hospital-Acquired Illness or " Injury  Outcome: Progressing  Intervention: Identify and Manage Fall Risk  Recent Flowsheet Documentation  Taken 1/12/2023 0015 by Chantelle Damico RN  Safety Promotion/Fall Prevention:   assistive device/personal items within reach   clutter free environment maintained   lighting adjusted   fall prevention program maintained   nonskid shoes/slippers when out of bed     Problem: Plan of Care - These are the overarching goals to be used throughout the patient stay.    Goal: Absence of Hospital-Acquired Illness or Injury  Intervention: Identify and Manage Fall Risk  Recent Flowsheet Documentation  Taken 1/12/2023 0015 by Chantelle Damico RN  Safety Promotion/Fall Prevention:   assistive device/personal items within reach   clutter free environment maintained   lighting adjusted   fall prevention program maintained   nonskid shoes/slippers when out of bed     Problem: Plan of Care - These are the overarching goals to be used throughout the patient stay.    Goal: Optimal Comfort and Wellbeing  Outcome: Progressing     Problem: Plan of Care - These are the overarching goals to be used throughout the patient stay.    Goal: Readiness for Transition of Care  Intervention: Mutually Develop Transition Plan  Recent Flowsheet Documentation  Taken 1/11/2023 2209 by Chantelle Damico RN  Transportation Anticipated: car, drives self     Problem: Plan of Care - These are the overarching goals to be used throughout the patient stay.    Goal: Readiness for Transition of Care  Recent Flowsheet Documentation  Taken 1/11/2023 2209 by Chantelle Damico RN  Transportation Anticipated: car, drives self  Intervention: Mutually Develop Transition Plan  Recent Flowsheet Documentation  Taken 1/11/2023 2209 by Chantelle Damico RN  Transportation Anticipated: car, drives self

## 2023-01-12 NOTE — PROGRESS NOTES
Patient admitted to: [facility]  Admitted from: ED   Arrived by: wheel chair.  Reason for admission:  Patient accompanied by:  Belongings: With pt in room. No home medications brought in  Teaching: to unit routine, visitors, room service, call light and bed operation  Skin double check completed by: Soumya Damico RN and Xavi Lou RN

## 2023-01-12 NOTE — UTILIZATION REVIEW
"  Admission Status; Secondary Review Determination         Under the authority of the Utilization Management Committee, the utilization review process indicated a secondary review on the above patient.  The review outcome is based on review of the medical records, discussions with staff, and applying clinical experience noted on the date of the review.          (x) Observation Status Appropriate - This patient does not meet hospital inpatient criteria and is placed in observation status. If this patient's primary payer is Medicare and was admitted as an inpatient, Condition Code 44 should be used and patient status changed to \"observation\".     RATIONALE FOR DETERMINATION   47 year old female with PMH significant for GERD, asthma, metastatic malignant melanoma, anxiety, obesity, STORMY, diabetes, UC s/p TAC with end ileostomy, and RA here for acute on chronic abdominal pain with nausea and new onset costochondritis  Surgery and gastroenterology did not recommend any inpatient work-up or surgery, chest pain was felt to be noncardiac likely costochondritis per documentation, expected discharge on 1/12 per history and physical.  The severity of illness, intensity of service provided, expected LOS and risk for adverse outcome make the care appropriate for further observation; however, doesn't meet criteria for hospital inpatient admission. Dr Gary  notified of this determination.    This document was produced using voice recognition software.      The information on this document is developed by the utilization review team in order for the business office to ensure compliance.  This only denotes the appropriateness of proper admission status and does not reflect the quality of care rendered.         The definitions of Inpatient Status and Observation Status used in making the determination above are those provided in the CMS Coverage Manual, Chapter 1 and Chapter 6, section 70.4.      Sincerely,     EUGENIA TORRES MD  "   System Medical Director  Utilization Management  [facility].

## 2023-01-12 NOTE — CONSULTS
Gastroenterology Consultation  GI Luminal Service    Date of Admission:  1/11/2023  Reason for Admission: Acute on chronic abdominal pain, nausea, costochondritis   Date of Consult  1/12/2023   Requesting Physician:  Lowell Pérez MD           ASSESSMENT AND RECOMMENDATIONS:   Assessment:  Doretha Yu is a 47 year old female with a history of GERD, asthma, metastatic malignant melanoma, anxiety, obesity, STORMY, diabetes, UC s/p TAC with end ileostomy (6/15/2021), and RA (on prednisone taper) who presented to the ED 1/11/2023 with new onset costochondritis and acute on chronic abdominal pain, nausea and vomiting for which the GI Luminal Service has been consulted.      # Subacute Nausea  # Subacute Vomiting, resolved  # Epigastric Pain  # Anxiety  Reports subacute constant nausea with vomiting 1-6 times daily and constant epigastric pain. Timeline varies with repeated questioning, ~4 weeks versus since starting her weight loss medication which was started October 2022 per chart review. No vomiting in the last 3 days but persistent nausea. Lipase was within normal limits on admission. H. Pylori stool antigen negative 1/11/2022. Recent EGD at [facility] 10/4/2022 reported with LA Grade A esophagitis with no bleeding, gastritis (biopsied), and normal examined duodenum. Pathology returned with fundic-type mucosa with mild chronic inflammation and focal active inflammation, antral mucosa with mild reactive epithelial change, no intestinal metaplasia, H. Pylori negative. Patient confirms she has been on Pantoprazole 40 mg daily every morning since that time. Nausea with vomiting likely multifactorial with differential including but not limited to medication side-effect, cannabis hyperemesis syndrome, complicated by anxiety. Potentially esophageal irritation from repeated vomiting. Less likely a new gastric ulcer given PPI daily. She is fixated on concern for esophageal perforation, reporting this is causing her a lot  "of anxiety. Reasonable to proceed with gastrografin XR esophagram for evaluation. Additionally, psychiatry consult regarding patient's self-reported anxiety as the patient repeated, \"My anxiety is very high\" and needs to be sure she does not have an esophageal perforation. Reports she currently takes maximum dosing of venlafaxine that controls her depression but not her anxiety. Agree with continuing Zofran and PRN atarax at this time.     # Chest Pain  Endorses 3 day history of chest pressure, intermittent sharp pain that radiates to between her shoulder blades. Given negative cardiac work-up at this time, differential includes musculoskeletal pain versus esophageal irritation from repeated vomiting, also complicated by anxiety. Less likely esophageal perforation given no perforation on CT nor evidence of crepitus on physical exam. Favor continuing supportive cares and conservative management, including PPI 40 mg BID.      Recommendations:  -- Gastrografin XR Esophagram to rule-out esophageal perforation in the setting of persistent nausea, vomiting and substernal chest pain.   -- Agree with Zofran and PRN Atarax for nausea.   -- Consider increasing PPI to 40 mg BID.   -- Complete abstinence from marijuana.   -- Consider Psychiatry consult given patient reporting her anxiety is very high.  -- Continue Supportive Cares  - Adequate volume resuscitation with IVF, pRBCs.  - Monitor Hemoglobin closely. Transfuse to keep Hgb > 7 g/dL from GI standpoint.   - Monitor Platelets closely. Keep PLT > 50 10e3/uL from GI standpoint.  - Maintain hemodynamics: MAP >65 mmHg and HR <100.  - Monitor and optimize electrolytes.  - Monitor and optimize nutrition. --> Diet per primary team. Appreciate RD nutrition recs.   - Reposition/Ambulate every 2 hours while awake.    -- No indication for acute GI endoscopic intervention at this time.  -- Avoid NSAIDs.  -- Analgesia/Antiemetics per primary team    COVID status: Negative " "1/11/2023.    Thank you for involving us in this patient's care. Please do not hesitate to contact the GI service with any questions or concerns.     The patient was discussed and plan agreed upon with Dr. Elisa Flanagan, GI Luminal Service staff physician.    Overall time spent on the date of this encounter preparing to see the patient (including chart review of available notes, clinical status events, imaging and labs); discussing, ordering, coordinating recommended medications, tests or procedures; communicating with other health care professionals; and documenting the above clinical information in the electronic medical record was 150 minutes.    Chantelle Quintanilla PA-C  Inpatient Gastroenterology Service  [facility]  Text Page           History of Present Illness:   Patient seen and examined at 16:20. History is obtained from patient and chart review. Patient's \"second mom\" Eunice is present with her.     Doretha Yu is a 47 year old female with a PMH significant for GERD, asthma, metastatic malignant melanoma, anxiety, obesity, STORMY, diabetes, UC s/p TAC with end ileostomy (6/15/2021), and RA (on prednisone taper) who presented to the ED 1/11/2023 with new onset costochondritis and acute on chronic abdominal pain, nausea and vomiting for which the GI Luminal Service has been consulted.      TEJINDER Puente reports she reported to him that she spoke with her [place] doctor who reported concern regarding esophageal tears.     Reports being started on 2 different weight loss medication, 1 of which was stopped due to an allergy, the other Mounjaro (Tirzepatide) continued. Reports since starting that medications, she has experienced constant nausea, vomiting 1-6 times daily, and epigastric pain - aching to sharp in nature. Reports she has not vomited for 3 days now but continues to experience persistent nausea and ache to sharp epigastric pain. Reports she stopped the weight loss medication a couple " weeks.     Reports 3 days ago, chest pain (points to mid-sternum) started, aching like something is sitting on her chest to intermittently sharp and radiates to between shoulder blades.     Reports baseline ostomy output.     Reports vaping Marijuana around 2x weekly, sometimes one puff, sometimes 3+ puffs, started this in November 2022. Previously tried the tincture and gummies but did not like the way they made her feel.     Reports her anxiety is very high right now about this chest pain. She also reports concern that she has caused a tear or hole in her esophagus and that is what is causing her chest pain. Reassured her that imaging and her physical exam has no evidence of perforation. She again reports her anxiety is very high and that she would like a test that confirms she does not have a tear in her esophagus. Discussed Gastrografin Xray esophagram for evaluation which she would like to do now. Explained that there is uncertainty as to exactly when this could be completed.     She reports her depression is well-controlled with her Venlafaxine but her anxiety is not. Reports she does not believe this is a panic attack.     Discussed nausea, vomiting and abdominal pain may be multifactorial including medication adverse effect (Tirzepatide adverse effects per UpToDate: nausea 12% to 18%, abdominal pain 5% to 6%, vomiting 5% to 9%), cannabis hyperemesis, anxiety. Chest pain may be musculoskeletal from vomiting +/- anxiety +/- potentially esophageal irritation from vomiting.      PTA takes pantoprazole 40 mg PO Daily.     Per chart review, saw [facility] Dr. Lemuel Sanford for telemedicine visit 10/26/2022 for morbid obesity, added Qsymia and Tirzepatide  for weight loss medications and taper Gabapentin and prednisone. Seen again 11/30/2022 where she was noted to have stopped Qsymia due to nausea and weakness/fatigue that resolved after the medication was stopped, noting she was tolerating Tirzepatide well  "suppressing her appetite.      Previously followed in GI IBD clinic with Dr. Renard Davey and Zeferino Sanchez PA-C prior to colectomy.     Per chart review, most recent [facility] GI IBD Virtual Visit 7/12/2021 with Zeferino Sanchez PA-C:  IBD HISTORY  Age at diagnosis: 2021  Extent of disease: proctosigmoid   Current UC medications: None  Prior UC surgeries: total abdominal colectomy and end ileostomy 6/15/21   Prior IBD Medications:   Infliximab, 1 dose in the hospital good response  Vedolizumab, completed induction dosing  Prednisone, minimal response to high-dose oral and IV with significant weight gain  5-ASA, no response  Topical therapies to include enemas, no response  ASSESSMENT/PLAN  Ms. Fernandez is 45 yo F her for follow up following colectomy  1.  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 She continues to follow closely with colorectal surgery at this time.   2.  Seronegative      Previous Procedures:    10/4/2022 - EGD for \"abnormal PET scan of the GI tract\" with Dr. Mauro Bailey - [facility]   Findings:        LA Grade A (one or more mucosal breaks less than 5 mm, not extending        between tops of 2 mucosal folds) esophagitis with no bleeding was found        at the gastroesophageal junction.        Localized mild inflammation was found in the gastric antrum. Biopsies        were taken with a cold forceps for histology. No evidence for recurrent        melanoma.        The examined duodenum was normal.   Post-op Diagnoses:        - LA Grade A esophagitis with no bleeding.        - Gastritis. Biopsied.        - Normal examined duodenum.  Addendum  A Helicobacter pylori immunostain is negative (block A1).     Signed by Ela Laughlin M.D., Ph.D. 10/7/2022 11:54 AM   This test was developed using an analyte specific reagent.   Its performance characteristics were determined by [facility] in a manner consistent with CLIA requirements. This   test has not been cleared or " approved by the U.S. Food and   Drug Administration. REVISED RESULTS   Interpretation  FINAL DIAGNOSIS   A.  Stomach, antrum, body, endoscopic biopsy:  Fundic-type   mucosa with mild chronic inflammation and focal active   inflammation.  Antral mucosa with mild reactive epithelial   change.  No intestinal metaplasia.  An immunohistochemical   stain for Helicobacter pylori is pending and will be   reported in an addendum.                    Past Medical History:   Reviewed and edited as appropriate  Past Medical History:   Diagnosis Date     Abnormal MRI     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      Anxiety      Basal cell carcinoma      Cervical high risk HPV (human papillomavirus) test positive 2019    See problem list     Colitis      Depression      Diabetes mellitus, iatrogenic (H) 2020     Esophageal reflux      Inflammatory arthritis      Insomnia      Intestinal giardiasis 2018     Lumbago     left lower back pain     Lymphedema      Malignant melanoma (H)      Melanoma (H) 10/23/2017     Migraines      Mild persistent asthma      Morbid obesity with BMI of 40.0-44.9, adult (H)      STORMY (obstructive sleep apnea)      Prothrombin deficiency (H)     takes 81mg asa daily     Stroke (cerebrum) (H)     During      TIA (transient ischemic attack)      Type 2 diabetes mellitus (H)             Past Surgical History:   Reviewed and edited as appropriate   Past Surgical History:   Procedure Laterality Date     APPENDECTOMY       COLONOSCOPY N/A 10/18/2017    Procedure: COLONOSCOPY;  Colon;  Surgeon: Debbie Stephens MD;  [facility]     COLONOSCOPY N/A 2018    Procedure: COMBINED COLONOSCOPY, SINGLE OR MULTIPLE BIOPSY/POLYPECTOMY BY BIOPSY;  colon;  Surgeon: Benita Schumacher MD;  Location: [facility] GI     COLONOSCOPY      multiple since 2018 to present - about 6 total     DISSECT LYMPH NODE AXILLA Left 10/23/2017    Procedure: DISSECT LYMPH NODE AXILLA;  Left  Axillary Lymph Node Dissection ;  Surgeon: Laurent Cool MD;  Location: [facility] OR     EXAM UNDER ANESTHESIA PELVIC N/A 2020    Procedure: EXAM UNDER ANESTHESIA, PELVIS; with Cervical Biopsies, Vaginal Biopsy and Endocervical Curettings;  Surgeon: Melina Jung MD;  Location: [facility] OR     GYN SURGERY  ,          LAPAROSCOPIC ASSISTED COLECTOMY N/A 06/15/2021    Procedure: laparoscopic total abdominal colectomy, end ileostomy;  Surgeon: Quinton Ram MD;  Location: [facility] OR     REPAIR MOHS Left 2017    Procedure: REPAIR MOHS;  Left Upper Lid Moh's Reconstruction;  Surgeon: Kisha Bosch MD;  Location:  OR              Social History:   The patient lives in [place], [place].    Alcohol: Rarely, last drink in 2022 on her trip to [place].   Tobacco: Former cigarette smoker, quit ~20 years ago.  Illicit drugs: Marijuana, currently vapes around 2 times weekly 1-3+ times during those days.            Family History:   Patient's family history is reviewed today and is non-contributory    Family History   Problem Relation Age of Onset     Cancer Mother 45        lung     Neurologic Disorder Mother         epilepsy     Lipids Father      Gastrointestinal Disease Father         diverticulitis      Depression Father      Colitis Father      Colon Cancer Father      Diverticulitis Father      Cancer Maternal Grandmother      Blood Disease Maternal Grandmother         lymphoma      Arthritis Maternal Grandmother      Diabetes Maternal Grandmother      Depression Maternal Grandmother      Macular Degeneration Maternal Grandmother      Glaucoma Maternal Grandmother      Diabetes Maternal Grandfather      Cerebrovascular Disease Maternal Grandfather      Blood Disease Maternal Grandfather      Heart Disease Maternal Grandfather      Glaucoma Maternal Grandfather      Cancer Paternal Grandmother      Cancer - colorectal Paternal Grandmother      Colitis Paternal Grandmother      Colon  Cancer Paternal Grandmother      Diverticulitis Paternal Grandmother      Respiratory Paternal Grandfather         emphysema      Colitis Paternal Grandfather      Colon Cancer Paternal Grandfather      Diverticulitis Paternal Grandfather      Heart Disease Daughter      Asthma Daughter      Depression Sister      Melanoma No family hx of              Allergies:   Reviewed and edited as appropriate     Allergies   Allergen Reactions     Bee Venom Swelling     Azithromycin Diarrhea     Erythromycin      Other reaction(s): GI intolerance, Vomiting     Fentanyl Other (See Comments)     sweating  sweating     Prochlorperazine Fatigue     Other reaction(s): Other (see comments)  Fatigue     Buspirone      Other reaction(s): GI intolerance  vomiting     Erythrocin Nausea and Vomiting     Topamax [Topiramate]      Made her lethargic     Zithromax [Azithromycin Dihydrate] Diarrhea     Enbrel [Etanercept] Hives and Rash            Medications:     Current Facility-Administered Medications   Medication     acetaminophen (TYLENOL) tablet 975 mg     [Held by provider] acetaminophen (TYLENOL) tablet 975 mg     albuterol (PROVENTIL HFA/VENTOLIN HFA) inhaler     calcium carbonate (TUMS) chewable tablet 500 mg     glucose gel 15-30 g    Or     dextrose 50 % injection 25-50 mL    Or     glucagon injection 1 mg     diclofenac (VOLTAREN) 1 % topical gel 4 g     ferrous fumarate 65 mg ([place]. FE)-Vitamin C 125 mg (VITRON C) tablet 1 tablet     gabapentin (NEURONTIN) tablet 600 mg     HYDROmorphone (DILAUDID) injection 0.4 mg     hydrOXYzine (ATARAX) tablet 25 mg     lidocaine (LMX4) cream     lidocaine (viscous) (XYLOCAINE) 2 % 15 mL, alum & mag hydroxide-simethicone (MAALOX) 15 mL GI Cocktail     lidocaine 1 % 0.1-1 mL     melatonin tablet 3 mg     metFORMIN (GLUCOPHAGE) tablet 1,000 mg     methocarbamol (ROBAXIN) tablet 1,000 mg     naloxone (NARCAN) injection 0.2 mg    Or     naloxone (NARCAN) injection 0.4 mg    Or     naloxone  (NARCAN) injection 0.2 mg    Or     naloxone (NARCAN) injection 0.4 mg     ondansetron (ZOFRAN ODT) ODT tab 4 mg    Or     ondansetron (ZOFRAN) injection 4 mg     ondansetron (ZOFRAN) injection 4 mg     pantoprazole (PROTONIX) IV push injection 40 mg     polyethylene glycol (MIRALAX) Packet 17 g     predniSONE (DELTASONE) tablet 1 mg     prochlorperazine (COMPAZINE) injection 10 mg    Or     prochlorperazine (COMPAZINE) tablet 10 mg    Or     prochlorperazine (COMPAZINE) suppository 25 mg     simethicone (MYLICON) chewable tablet  mg     sodium chloride (PF) 0.9% PF flush 3 mL     sodium chloride (PF) 0.9% PF flush 3 mL     sodium chloride 0.9% infusion     venlafaxine (EFFEXOR) tablet 150 mg     Facility-Administered Medications Ordered in Other Encounters   Medication     lidocaine 1 % 9 mL     sodium bicarbonate 8.4 % injection 1 mEq             Review of Systems:     A complete review of systems was performed and is negative except as noted in the HPI           Physical Exam:   Temp: 98.6  F (37  C) Temp src: Axillary BP: 101/75 Pulse: 92   Resp: 16 SpO2: 96 % O2 Device: None (Room air)    Wt:   Wt Readings from Last 2 Encounters:   01/12/23 103.3 kg (227 lb 11.2 oz)   01/10/23 99.8 kg (220 lb)        General: 47 year old female lying in bed in NAD. Appears anxious.  Answers appropriately.    HEENT: Head is AT/NC. Sclera anicteric.   Lungs: Clear to auscultation bilaterally.  No wheezes, rhonchi or crackles appreciated.    Heart: Regular rate and rhythm.  No murmurs, gallops or rubs appreciated.  Normal S1 and S2. Peripheral perfusion intact.  Abdomen: Soft, non-distended.  BS +. +ileostomy. +epigastric pain without rebound or guarding. No peritoneal signs.  Extremities: WWP.  Musculoskeletal: No gross deformity. +tenderness to palpation of sternum.   Skin: No jaundice or rash on exposed skin.  Neurologic: Grossly non-focal.  CN 2-12 grossly intact.   Mental status/Psych: A&O. Asks/answers questions  appropriately. Anxious. Interactive.            Data:   LAB WORK:    BMP  Recent Labs   Lab 01/12/23  1721 01/12/23  1147 01/12/23  0736 01/11/23  1214 01/10/23  1050   NA  --   --  141 139 140   POTASSIUM  --   --  4.0 4.0 4.3   CHLORIDE  --   --  108* 103 104   PATRICIA  --   --  8.6 9.7 9.6   CO2  --   --  21* 22 24   BUN  --   --  9.1 9.6 10.5   CR  --   --  0.73 0.80 0.68   * 115* 110* 120* 118*     CBC  Recent Labs   Lab 01/12/23  0736 01/11/23  1214 01/10/23  1050   WBC 7.2 10.3 10.2   RBC 3.92 4.61 4.27   HGB 10.7* 12.7 11.9   HCT 34.7* 40.7 36.3   MCV 89 88 85   MCH 27.3 27.5 27.9   MCHC 30.8* 31.2* 32.8   RDW 14.8 15.0 14.7    448 392     INR  Recent Labs   Lab 01/12/23  0736   INR 0.95     LFTs  Recent Labs   Lab 01/12/23  0736 01/11/23  1214 01/10/23  1050   ALKPHOS 72 91 89   AST 22 26 25   ALT 27 34 33   BILITOTAL <0.2 0.2 <0.2   PROTTOTAL 6.0* 7.2 6.9   ALBUMIN 3.5 4.1 3.8      PANC  Recent Labs   Lab 01/11/23  1214 01/10/23  1050   LIPASE 19 21       IMAGING:    IMAGING:    EXAM: NUCLEAR MEDICINE HEPATOBILIARY SCAN WITH GALLBLADDER EJECTION FRACTION  LOCATION: [facility]  DATE/TIME: 1/10/2023 9:58 PM  FINDINGS: Normal radiotracer uptake by the liver and excretion into the intrahepatic bile ducts, common bile duct and duodenum. The gallbladder fills with radiotracer. On the post-cholecystokinin images, the gallbladder contracts with a measured ejection   fraction of 7% (Normal range = 35% or greater).                                                                    IMPRESSION:  1. The gallbladder fills with radiotracer and there is therefore no scintigraphic evidence of acute cholecystitis.  2. Low gallbladder ejection fraction of 7%. In the appropriate clinical setting, this is suggestive of biliary dyskinesia/dysfunction      ULTRASOUND ABDOMEN LIMITED January 10, 2023 2:22 PM  FINDINGS:     GALLBLADDER: The gallbladder is normal. No gallstones,  wall  thickening, or pericholecystic fluid. Negative sonographic Mandel's  sign.     BILE DUCTS: There is no biliary dilatation. The common duct measures 4  mm.     LIVER: Fatty liver. Liver is 17.7 cm. No visible focal hepatic lesion.  The abnormality at CT is unable to be seen.     RIGHT KIDNEY: No hydronephrosis.     PANCREAS: The visualized portions of the pancreas are normal.     No ascites.                                                                  IMPRESSION:  1.  No gallstones. Negative sonographic Mandel's sign.  2.  Fatty liver.    CT CHEST PE, ABDOMEN & PELVIS WITH CONTRAST January 10, 2023 12:31  PM  FINDINGS:  ANGIOGRAM CHEST: Pulmonary arteries are normal caliber and negative  for pulmonary emboli. Thoracic aorta is negative for dissection. No CT  evidence of right heart strain.      LUNGS AND PLEURA: No effusion. No acute airspace disease. Minor  bibasilar atelectasis. No acute airspace disease.     MEDIASTINUM/AXILLAE: No acute abnormality. No suspicious lymph nodes.     CORONARY ARTERY CALCIFICATION: None.     HEPATOBILIARY: There is hypoenhancing nodular and linear region at the  periphery of the right liver, for example segment 7 measuring 2.2 cm  series 10 image 28. This shows stability in the short interval. This  is clearly demonstrated on an older CT from 6/3/2021. A portion of  this is linear extending superiorly, for example series 10 images 17  through 24. No acute hepatic abnormality. Gallbladder unremarkable.     PANCREAS: Atrophic body and tail. No acute abnormality.     SPLEEN: Normal.     ADRENAL GLANDS: Normal.     KIDNEYS/BLADDER: Normal.     BOWEL: Subtotal colectomy and right abdominal ileostomy appears  stable. Stable parastomal fat-containing hernia. Stable Aureliano  pouch. No acute inflammatory change of the bowel is seen. No abscess  or free air.     LYMPH NODES: Normal.     PELVIC ORGANS: No acute abnormality.     OTHER: None.     MUSCULOSKELETAL: No convincing  aggressive or acute osseous  abnormality.                                                                      IMPRESSION:  1.  No evidence for pulmonary embolism, acute aortic abnormality, or  acute airspace disease.  2.  Indeterminate hypoenhancement along the right hepatic dome  compared to recent imaging but this appears new since 2021. As such  this is considered indeterminate. A neoplastic etiology is not  entirely excluded. Recommend correlation with nonurgent hepatic  specific MRI.    Most Recent MRE 12/27/2022  FINDINGS:     Exam quality: Satisfactory.     Bowel: A right paramedian mid abdominal ileostomy is present. A  fat-containing noninflamed parastomal hernia is noted. Patient is  status post subtotal colectomy. A long Mejía's pouch is present.  There is minimal wall thickening and minimal increased enhancement of  the Mejía's pouch, which is nonspecific but favored to be  accentuated by underdistention as no T2 signal abnormality, restricted  diffusion, or adjacent inflammatory stranding is present. No findings  specific for acute inflammation of the small bowel. Specifically, no  small bowel wall thickening, mucosal hyperenhancement, T2 signal  abnormality, or inflammatory stranding. No sign of bowel obstruction  or stricture. No fistula or abscess is seen. Stomach is  normal-appearing.     Remainder of exam: The liver, gallbladder, spleen, adrenal glands,  kidneys, and urinary bladder are normal-appearing. Similar-appearing  atrophy of the pancreas. Several prominent to mildly enlarged  pericolonic perirectal lymph nodes are noted, which appear stable to  recent comparison and are likely reactive in nature. Nabothian cysts  are seen at the endocervical junction. No ascites. No acute osseous  abnormality.                                                                      IMPRESSION:   1. Status post subtotal colectomy with right paramedian mid abdominal  ileostomy and long Mejía's pouch.  2.  No findings specific for acute inflammation of the bowel.  3. Multiple prominent pericolonic/perirectal lymph nodes, similar to  comparison.  4. Fat-containing noninflamed parastomal hernia.   5. Pancreatic atrophy.      PET Scan at [facility] 6/2/2022  Impression      1. Focal activity in the stomach likely represents physiologic or inflammatory activity, however   submucosal metastases are a remote consideration. Endoscopy may be beneficial to more definitively   assess.   2. No evidence for recurrent or metastatic disease elsewhere.  Narrative    EXAM:  PET CT SKULL TO THIGH FDG     Serum glucose at time of F-18 FDG injection was 118 mg/dL. Patient followed standard dietary/fasting   requirements for this exam.     RADIOPHARMACEUTICAL/MEDS:   Route: intravenous   fludeoxyglucose F 18 injection USP (FDG F-18),10.05 millicurie     TECHNIQUE:  F18 FDG PET/CT scan was performed from the vertex through the knees with low dose,   non-contrast, free-breathing CT images for attenuation correction and anatomic localization (AC/AL),   with imaging beginning at approximately 60 minutes after radiotracer injection.     COMPARISON:  PET/CT 12/21/2021     INDICATION:  Metastatic melanoma involving the left axilla diagnosed 2017. Treated with resection   and axillary lymphadenectomy with subsequent immunotherapy. Subsequent treatment strategy.     The patient reports no recent vaccinations.     FINDINGS:   Postoperative changes in the left axilla for resection of malignant melanoma. No evidence for local   recurrence.     No enlarged or significantly FDG avid lymph nodes throughout.     Focal areas of activity in the gastric fundus (image 162), gastric antrum (image 175) and the   pylorus (image 181) are indeterminate. The most FDG avid focus in the fundus demonstrates an SUV max   of 6.4. Findings likely inflammatory/physiologic in etiology, however submucosal metastasis is a   remote possibility.     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 Diffuse activity   throughout the small bowel is likely physiologic.     Significant, incidental findings on the low-dose, noncontrast CT:   Mucous retention cyst in the right maxillary sinus. Parastomal hernia.          =======================================================================

## 2023-01-12 NOTE — PHARMACY-ADMISSION MEDICATION HISTORY
Admission Medication History Completed by Pharmacy    See [facility] Admission Navigator for allergy information, preferred outpatient pharmacy, prior to admission medications and immunization status.     Medication History Sources:     Patient    SureScripts    Changes made to PTA medication list (reason):    Added: None    Deleted: -Tirzepatide 2,5 mg SC weekly. Patient stopped taking ~2 weeks ago.  -Duplicate loperamide     Changed: -Patient reports taking prednisone 1 mg daily and gabapentin 600 mg daily.    Additional Information:    None    Prior to Admission medications    Medication Sig Last Dose Taking? Auth Provider Long Term End Date   acetaminophen (TYLENOL) 325 MG tablet Take 3 tablets (975 mg) by mouth every 8 hours As scheduled for the next 7-10 days, then decrease both dose & frequency to as needed there after. Past Month Yes Judi Sanabria PA-C     albuterol (PROAIR HFA/PROVENTIL HFA/VENTOLIN HFA) 108 (90 Base) MCG/ACT inhaler Inhale 2 puffs into the lungs every 4 hours as needed for shortness of breath / dyspnea More than a month Yes Ollie Cuevas MD Yes    Calcium Carb-Cholecalciferol 600-800 MG-UNIT TABS Take 800 mg by mouth every morning (before breakfast) Past Week Yes Yobany Hagen MD     calcium carbonate (TUMS) 500 MG chewable tablet Take 1 tablet (500 mg) by mouth 3 times daily as needed for heartburn Past Week Yes Anna Naidu MD     Cholecalciferol (VITAMIN D3) 25 MCG TABS TAKE THREE TABLETS BY MOUTH ONCE DAILY Past Week Yes Anna Naidu MD     cyanocobalamin (VITAMIN B-12) 1000 MCG tablet Take 1 tablet (1,000 mcg) by mouth daily Past Week Yes Anna Naidu MD     gabapentin (NEURONTIN) 600 MG tablet TAKE ONE TABLET (600MG) BY MOUTH FOUR TIMES A DAY  Patient taking differently: Take 600 mg by mouth daily Past Week Yes Anna Naidu MD Yes    hydrOXYzine (ATARAX) 25 MG tablet Take 1 tablet (25 mg) by mouth every 6 hours as needed for anxiety Past Month  Yes Judi Sanabria PA-C     loperamide (IMODIUM A-D) 2 MG tablet Take 2 tabs daily and increase as needed until output is apple sauce consistency. Max of 8 tabs (16 mg) per day. Past Month Yes Quinton Ram MD     medical cannabis (Patient's own supply) See Admin Instructions (The purpose of this order is to document that the patient reports taking medical cannabis.  This is not a prescription, and is not used to certify that the patient has a qualifying medical condition.) Past Month Yes Reported, Patient     metFORMIN (GLUCOPHAGE) 500 MG tablet Take 2 tablets (1,000 mg) by mouth 2 times daily (with meals) ** VISIT DUE/LAST FILL ** Past Week Yes Anna Naidu MD Yes    ondansetron (ZOFRAN ODT) 4 MG ODT tab Take 1 tablet (4 mg) by mouth every 8 hours as needed for nausea Past Month Yes Clifford Soliz MD     pantoprazole (PROTONIX) 40 MG EC tablet TAKE 1 TABLET BY MOUTH EVERY MORNING BEFORE BREAKFAST 1/11/2023 Yes Anna Naidu MD     predniSONE (DELTASONE) 1 MG tablet Take 2 tablets (2 mg) by mouth daily Take 10 mg daily. Taper 1 mg every 2 weeks  Patient taking differently: Take 1 mg by mouth daily Taper 1 mg every 2 weeks Past Week Yes Anna Naidu MD No    simethicone (MYLICON) 80 MG chewable tablet Take 1-2 tablets ( mg) by mouth 4 times daily as needed for cramping Past Month Yes Judi Sanabria PA-C     venlafaxine (EFFEXOR) 75 MG tablet TAKE 2 TABLETS (150 MG) BY MOUTH 2 TIMES DAILY Past Week Yes Anna Naidu MD Yes    VITRON-C  MG TABS tablet Take 1 tablet by mouth daily More than a month Yes Judi Sanabria PA-C     Ostomy Supplies MISC 20 each daily   Jena Thompson APRN CNP     oxyCODONE-acetaminophen (PERCOCET) 5-325 MG tablet Take 1 tablet by mouth every 6 hours as needed for severe pain (7-10)   Clifford Soliz MD         Date completed: 01/12/23    Medication history completed by: Phani Azul,  PharmD

## 2023-01-13 ENCOUNTER — APPOINTMENT (OUTPATIENT)
Dept: GENERAL RADIOLOGY | Facility: CLINIC | Age: 48
End: 2023-01-13
Attending: STUDENT IN AN ORGANIZED HEALTH CARE EDUCATION/TRAINING PROGRAM
Payer: COMMERCIAL

## 2023-01-13 ENCOUNTER — DOCUMENTATION ONLY (OUTPATIENT)
Dept: ONCOLOGY | Facility: CLINIC | Age: 48
End: 2023-01-13

## 2023-01-13 VITALS
OXYGEN SATURATION: 97 % | HEIGHT: 63 IN | RESPIRATION RATE: 16 BRPM | SYSTOLIC BLOOD PRESSURE: 99 MMHG | HEART RATE: 93 BPM | BODY MASS INDEX: 40.84 KG/M2 | WEIGHT: 230.5 LBS | TEMPERATURE: 98.6 F | DIASTOLIC BLOOD PRESSURE: 67 MMHG

## 2023-01-13 LAB
ALBUMIN SERPL BCG-MCNC: 3.2 G/DL (ref 3.5–5.2)
ALP SERPL-CCNC: 66 U/L (ref 35–104)
ALT SERPL W P-5'-P-CCNC: 35 U/L (ref 10–35)
ANION GAP SERPL CALCULATED.3IONS-SCNC: 11 MMOL/L (ref 7–15)
AST SERPL W P-5'-P-CCNC: 34 U/L (ref 10–35)
BASOPHILS # BLD AUTO: 0 10E3/UL (ref 0–0.2)
BASOPHILS NFR BLD AUTO: 0 %
BILIRUB SERPL-MCNC: <0.2 MG/DL
BUN SERPL-MCNC: 6.8 MG/DL (ref 6–20)
CALCIUM SERPL-MCNC: 8.1 MG/DL (ref 8.6–10)
CHLORIDE SERPL-SCNC: 108 MMOL/L (ref 98–107)
CREAT SERPL-MCNC: 0.68 MG/DL (ref 0.51–0.95)
DEPRECATED HCO3 PLAS-SCNC: 21 MMOL/L (ref 22–29)
EOSINOPHIL # BLD AUTO: 0.3 10E3/UL (ref 0–0.7)
EOSINOPHIL NFR BLD AUTO: 4 %
ERYTHROCYTE [DISTWIDTH] IN BLOOD BY AUTOMATED COUNT: 14.9 % (ref 10–15)
GFR SERPL CREATININE-BSD FRML MDRD: >90 ML/MIN/1.73M2
GLUCOSE BLDC GLUCOMTR-MCNC: 138 MG/DL (ref 70–99)
GLUCOSE SERPL-MCNC: 110 MG/DL (ref 70–99)
HCT VFR BLD AUTO: 34.8 % (ref 35–47)
HGB BLD-MCNC: 10.7 G/DL (ref 11.7–15.7)
IMM GRANULOCYTES # BLD: 0.1 10E3/UL
IMM GRANULOCYTES NFR BLD: 1 %
LYMPHOCYTES # BLD AUTO: 2.2 10E3/UL (ref 0.8–5.3)
LYMPHOCYTES NFR BLD AUTO: 29 %
MCH RBC QN AUTO: 27.2 PG (ref 26.5–33)
MCHC RBC AUTO-ENTMCNC: 30.7 G/DL (ref 31.5–36.5)
MCV RBC AUTO: 88 FL (ref 78–100)
MONOCYTES # BLD AUTO: 0.6 10E3/UL (ref 0–1.3)
MONOCYTES NFR BLD AUTO: 8 %
NEUTROPHILS # BLD AUTO: 4.5 10E3/UL (ref 1.6–8.3)
NEUTROPHILS NFR BLD AUTO: 58 %
NRBC # BLD AUTO: 0 10E3/UL
NRBC BLD AUTO-RTO: 0 /100
PLATELET # BLD AUTO: 342 10E3/UL (ref 150–450)
POTASSIUM SERPL-SCNC: 3.7 MMOL/L (ref 3.4–5.3)
PROT SERPL-MCNC: 5.4 G/DL (ref 6.4–8.3)
RBC # BLD AUTO: 3.94 10E6/UL (ref 3.8–5.2)
SODIUM SERPL-SCNC: 140 MMOL/L (ref 136–145)
WBC # BLD AUTO: 7.6 10E3/UL (ref 4–11)

## 2023-01-13 PROCEDURE — 250N000009 HC RX 250: Performed by: STUDENT IN AN ORGANIZED HEALTH CARE EDUCATION/TRAINING PROGRAM

## 2023-01-13 PROCEDURE — 99233 SBSQ HOSP IP/OBS HIGH 50: CPT

## 2023-01-13 PROCEDURE — 258N000003 HC RX IP 258 OP 636: Performed by: STUDENT IN AN ORGANIZED HEALTH CARE EDUCATION/TRAINING PROGRAM

## 2023-01-13 PROCEDURE — 74220 X-RAY XM ESOPHAGUS 1CNTRST: CPT | Mod: 26 | Performed by: RADIOLOGY

## 2023-01-13 PROCEDURE — 99239 HOSP IP/OBS DSCHRG MGMT >30: CPT | Mod: GC | Performed by: PEDIATRICS

## 2023-01-13 PROCEDURE — 250N000011 HC RX IP 250 OP 636

## 2023-01-13 PROCEDURE — 99204 OFFICE O/P NEW MOD 45 MIN: CPT | Performed by: PSYCHIATRY & NEUROLOGY

## 2023-01-13 PROCEDURE — 99418 PROLNG IP/OBS E/M EA 15 MIN: CPT

## 2023-01-13 PROCEDURE — 250N000013 HC RX MED GY IP 250 OP 250 PS 637: Performed by: STUDENT IN AN ORGANIZED HEALTH CARE EDUCATION/TRAINING PROGRAM

## 2023-01-13 PROCEDURE — C9113 INJ PANTOPRAZOLE SODIUM, VIA: HCPCS

## 2023-01-13 PROCEDURE — G0378 HOSPITAL OBSERVATION PER HR: HCPCS

## 2023-01-13 PROCEDURE — 80053 COMPREHEN METABOLIC PANEL: CPT | Performed by: STUDENT IN AN ORGANIZED HEALTH CARE EDUCATION/TRAINING PROGRAM

## 2023-01-13 PROCEDURE — 85025 COMPLETE CBC W/AUTO DIFF WBC: CPT | Performed by: STUDENT IN AN ORGANIZED HEALTH CARE EDUCATION/TRAINING PROGRAM

## 2023-01-13 PROCEDURE — 82962 GLUCOSE BLOOD TEST: CPT

## 2023-01-13 PROCEDURE — 74220 X-RAY XM ESOPHAGUS 1CNTRST: CPT

## 2023-01-13 PROCEDURE — 250N000013 HC RX MED GY IP 250 OP 250 PS 637

## 2023-01-13 PROCEDURE — 250N000012 HC RX MED GY IP 250 OP 636 PS 637

## 2023-01-13 PROCEDURE — 96376 TX/PRO/DX INJ SAME DRUG ADON: CPT

## 2023-01-13 PROCEDURE — 36415 COLL VENOUS BLD VENIPUNCTURE: CPT | Performed by: STUDENT IN AN ORGANIZED HEALTH CARE EDUCATION/TRAINING PROGRAM

## 2023-01-13 RX ORDER — HYDROXYZINE HYDROCHLORIDE 25 MG/1
25-50 TABLET, FILM COATED ORAL EVERY 6 HOURS PRN
Qty: 40 TABLET | Refills: 0 | Status: SHIPPED | OUTPATIENT
Start: 2023-01-13 | End: 2023-01-28

## 2023-01-13 RX ORDER — HYDROXYZINE HYDROCHLORIDE 25 MG/1
50 TABLET, FILM COATED ORAL EVERY 6 HOURS PRN
Status: DISCONTINUED | OUTPATIENT
Start: 2023-01-13 | End: 2023-01-13 | Stop reason: HOSPADM

## 2023-01-13 RX ORDER — PANTOPRAZOLE SODIUM 40 MG/1
40 TABLET, DELAYED RELEASE ORAL 2 TIMES DAILY
Qty: 180 TABLET | Refills: 0 | Status: SHIPPED | OUTPATIENT
Start: 2023-01-13 | End: 2023-04-13

## 2023-01-13 RX ORDER — PROCHLORPERAZINE MALEATE 10 MG
10 TABLET ORAL EVERY 6 HOURS PRN
Qty: 10 TABLET | Refills: 0 | Status: SHIPPED | OUTPATIENT
Start: 2023-01-13 | End: 2023-07-11

## 2023-01-13 RX ORDER — QUETIAPINE FUMARATE 25 MG/1
25 TABLET, FILM COATED ORAL 2 TIMES DAILY PRN
Qty: 10 TABLET | Refills: 0 | Status: SHIPPED | OUTPATIENT
Start: 2023-01-13 | End: 2023-07-11

## 2023-01-13 RX ORDER — SUCRALFATE 1 G/1
1 TABLET ORAL 4 TIMES DAILY PRN
Qty: 21 TABLET | Refills: 1 | Status: SHIPPED | OUTPATIENT
Start: 2023-01-13 | End: 2023-07-11

## 2023-01-13 RX ORDER — QUETIAPINE FUMARATE 25 MG/1
25 TABLET, FILM COATED ORAL ONCE
Status: COMPLETED | OUTPATIENT
Start: 2023-01-13 | End: 2023-01-13

## 2023-01-13 RX ORDER — PREDNISONE 1 MG/1
1 TABLET ORAL DAILY
Qty: 10 TABLET | Refills: 0
Start: 2023-01-13 | End: 2023-07-11

## 2023-01-13 RX ORDER — METHOCARBAMOL 1000 MG/1
1000 TABLET, FILM COATED ORAL 3 TIMES DAILY PRN
Qty: 21 TABLET | Refills: 0 | Status: SHIPPED | OUTPATIENT
Start: 2023-01-13 | End: 2023-07-11

## 2023-01-13 RX ORDER — ONDANSETRON 4 MG/1
4 TABLET, ORALLY DISINTEGRATING ORAL EVERY 8 HOURS PRN
Qty: 10 TABLET | Refills: 1 | Status: SHIPPED | OUTPATIENT
Start: 2023-01-13 | End: 2023-12-14

## 2023-01-13 RX ADMIN — ACETAMINOPHEN 975 MG: 325 TABLET, FILM COATED ORAL at 10:21

## 2023-01-13 RX ADMIN — Medication 1 TABLET: at 08:30

## 2023-01-13 RX ADMIN — VENLAFAXINE 150 MG: 75 TABLET ORAL at 17:46

## 2023-01-13 RX ADMIN — METFORMIN HYDROCHLORIDE 1000 MG: 500 TABLET ORAL at 17:46

## 2023-01-13 RX ADMIN — HYDROXYZINE HYDROCHLORIDE 50 MG: 25 TABLET, FILM COATED ORAL at 11:21

## 2023-01-13 RX ADMIN — METHOCARBAMOL 1000 MG: 500 TABLET ORAL at 08:29

## 2023-01-13 RX ADMIN — SODIUM CHLORIDE 1000 ML: 9 INJECTION, SOLUTION INTRAVENOUS at 02:33

## 2023-01-13 RX ADMIN — METHOCARBAMOL 1000 MG: 500 TABLET ORAL at 17:46

## 2023-01-13 RX ADMIN — PANTOPRAZOLE SODIUM 40 MG: 40 INJECTION, POWDER, FOR SOLUTION INTRAVENOUS at 08:30

## 2023-01-13 RX ADMIN — VENLAFAXINE 150 MG: 75 TABLET ORAL at 08:29

## 2023-01-13 RX ADMIN — METFORMIN HYDROCHLORIDE 1000 MG: 500 TABLET ORAL at 08:29

## 2023-01-13 RX ADMIN — ACETAMINOPHEN 975 MG: 325 TABLET, FILM COATED ORAL at 17:46

## 2023-01-13 RX ADMIN — ALUMINUM HYDROXIDE, MAGNESIUM HYDROXIDE, AND DIMETHICONE 30 ML/ML: 200; 20; 200 SUSPENSION ORAL at 02:43

## 2023-01-13 RX ADMIN — QUETIAPINE FUMARATE 25 MG: 25 TABLET ORAL at 13:33

## 2023-01-13 RX ADMIN — HYDROXYZINE HYDROCHLORIDE 50 MG: 25 TABLET, FILM COATED ORAL at 17:49

## 2023-01-13 RX ADMIN — ACETAMINOPHEN 975 MG: 325 TABLET, FILM COATED ORAL at 02:33

## 2023-01-13 RX ADMIN — PREDNISONE 1 MG: 1 TABLET ORAL at 08:30

## 2023-01-13 RX ADMIN — GABAPENTIN 600 MG: 600 TABLET, FILM COATED ORAL at 08:30

## 2023-01-13 ASSESSMENT — ACTIVITIES OF DAILY LIVING (ADL)
ADLS_ACUITY_SCORE: 20

## 2023-01-13 NOTE — CONSULTS
"          Initial Psychiatric Consult   Consult date: January 13, 2023         Reason for Consult, requesting source:    Anxiety, pain   Requesting source: Lowell Pérez. Discussed with nursing and Dr Gary     This note is being entered to supplement the psychiatry consultation note that was completed on January 13, 2023 by the licensed mental health professional Serina JACQUES, [facility]. They have reviewed with me the pertinent clinical details related to their encounter. I am being consulted to offer additional guidance on psychiatric pharmacological interventions.           HPI:   Doretha Yu is a 47 year old female with PMH significant for GERD, asthma, metastatic malignant melanoma, anxiety, obesity, STORMY, diabetes, UC s/p TAC with end ileostomy, and RA here for acute on chronic abdominal pain with nausea and new onset costochondritis.     She is seen today by psychiatry to assess her for anxiety and depression.  She spent most of my visit complaining about poor medical care and that she was receiving in the house and not addressing her problems with abdominal and chest pain.  She said she had a lot of problems since they are holding her Effexor for a while and she developed emotional problems from those.  I see that there was a slight delay in starting it; it was started on the evening of her second day of hospitalization.  At 1 point she said \" I just want it all to stop\", but she denies suicidal ideation or even significant depression.  However, she said her anxiety is under control and is exacerbated by the poor medical care she is receiving.  Reviewing her chart I see that she has on Prozac, Wellbutrin, Celexa and Zoloft off and on over the years.  She has been in psychotherapy but not recently.         Physical ROS:   The 10 point Review of Systems is negative other than noted in the HPI or here.         Family and Social History:   SH:She lives with her children ages 15 and 18; single parent but she " "does have some help with her parents  FH: Positive for some mood disorders and substance use         Medications:       acetaminophen  975 mg Oral Q8H     diclofenac  4 g Topical 4x Daily     ferrous fumarate 65 mg ([place]. FE)-Vitamin C 125 mg  1 tablet Oral Daily     gabapentin  600 mg Oral Daily     metFORMIN  1,000 mg Oral BID w/meals     pantoprazole  40 mg Intravenous BID     predniSONE  1 mg Oral Daily     QUEtiapine  25 mg Oral Once     sodium chloride (PF)  3 mL Intracatheter Q8H     venlafaxine  150 mg Oral BID     She received 50 mg of hydroxyzine prior to my arrival but did not seem to help much         Physical and Psychiatric Examination:     /86 (BP Location: Right arm)   Pulse 75   Temp 97.5  F (36.4  C) (Axillary)   Resp 16   Ht 1.6 m (5' 3\")   Wt 104.6 kg (230 lb 8 oz)   LMP 01/03/2023 (Approximate)   SpO2 97%   BMI 40.83 kg/m    Weight is 230 lbs 8 oz  Body mass index is 40.83 kg/m .    Physical Exam:  I have reviewed the physical exam as documented by by the medical team and agree with findings and assessment and have no additional findings to add at this time.    Mental Status Exam:  Appearance: awake, alert and adequately groomed  Attitude:  was very upset ,  Tearful at times   Eye Contact:  fair  Mood:  angry  Affect:  mood congruent  Speech:  clear, coherent  Psychomotor Behavior:  no evidence of tardive dyskinesia, dystonia, or tics  Muscle strength and tone: intact   Throught Process:  circumstantial  Associations:  no loose associations  Thought Content:  no evidence of suicidal ideation or homicidal ideation and no evidence of psychotic thought  Insight:  fair  Judgement:  limited  Oriented to:  time, person, and place  Attention Span and Concentration:  fair  Recent and Remote Memory:  not formally assessed   Language: able to name/identify objects without impairment  Fund of Knowledge: intact with awareness of current and past events             DSM-5 Diagnosis:   300.02 " "(F41.1) Generalized Anxiety Disorder   Adjustment disorder   Pain disorder            Assessment:   I suspect that her baseline functioning is somewhat of a struggle for her and she is probably a challenge to care for.  I think she would benefit from psychotherapy and she agreed to this. It was set up by Serina JACQUES, LGSW.   Short-term she would benefit from a bit of Seroquel just to calm her down enough to get out of the hospital.  I do not consider her a danger to herself          Summary of Recommendations:   From a psychiatric perspective she is okay to discharge  I will give her Seroquel 25 mg now and send her off with a prescription for a few 25 mg Seroquel to take 1/2-1 up to 3 times a day as needed for anxiety  Page me or re-consult psychiatry as needed (psychiatry is signing off).     Anthony Camacho M.D.   Consult liaison psychiatry   [facility]   Contact information available via [facility] Paging/Directory.  If I am not available, then [facility] intake ([phone]) should know who   Is on call        \"This dictation was performed with voice recognition software and may contain errors,  omissions and inadvertent word substitution.\"           "

## 2023-01-13 NOTE — PROGRESS NOTES
Observation goals PER UNIT ROUTINE  Comments: Improvement of abdominal pain: Pt is complaining of chest pressure not abdominal pain.

## 2023-01-13 NOTE — PLAN OF CARE
Temp: 97.5  F (36.4  C) Temp src: Axillary BP: 115/86 Pulse: 75   Resp: 16 SpO2: 97 % O2 Device: None (Room air)      A &O x4. AVSS. Had XR gastrografin esophagram. Psychiatry saw patient. Gave Robaxin, Atarax, and Tylenol for chest and epigastric pain with no relief per patient. Gave 1 time seroquel for anxiety. Voiding good UOP. Emptied own ileostomy. R PIV running NS at 75ml/hr. Tolerating diet. No c/o nausea

## 2023-01-13 NOTE — PROGRESS NOTES
GASTROENTEROLOGY PROGRESS AND SIGN-OFF NOTE  GI Luminal Service    Date of Admission: 1/11/2023  Reason for Admission: Costochondritis, subacute abdominal pain, nausea, vomiting    ASSESSMENT:  Doretha Yu is a 47 year old female with a history of GERD, asthma, metastatic malignant melanoma, anxiety, obesity, STORMY, diabetes, UC s/p TAC with end ileostomy (6/15/2021), and RA (on prednisone taper) who presented to the ED 1/11/2023 with new onset costochondritis and subacute abdominal pain, nausea and vomiting for which the GI Luminal Service has been consulted.       # Subacute Nausea  # Subacute Vomiting, resolved  # Subacute Epigastric Pain  # Anxiety  # Cannabis Use (Medical Marijuana)  Reports subacute constant nausea with vomiting 1-6 times daily and constant epigastric pain. Timeline varies with repeated questioning, ~4 weeks versus since starting her weight loss medication which was started October 2022 per chart review. No vomiting in the last 4 days but persistent constant nausea. Lipase was within normal limits on admission. H. Pylori stool antigen negative 1/11/2022. Recent EGD at [facility] 10/4/2022 reported with LA Grade A esophagitis with no bleeding, gastritis (biopsied), and normal examined duodenum. Pathology returned with fundic-type mucosa with mild chronic inflammation and focal active inflammation, antral mucosa with mild reactive epithelial change, no intestinal metaplasia, H. Pylori negative. Patient confirms she has been taking Pantoprazole 40 mg daily every morning since that time.     Subacute nausea, vomiting, epigastric pain likely multifactorial and complicated by anxiety with differential including but not limited to medication side-effect (gabapentin, metformin, venlafaxine, percocet, and Tirzepatide though discontinued at some point, now inpatient opioids), cannabis hyperemesis syndrome (vapes medical marijuana since November 2022), DGBI (patient reports very high anxiety),  "potentially esophageal irritation from repeated vomiting. Less likely a new gastric ulcer given PPI daily. She is fixated on concern for esophageal perforation, reporting this is causing her a lot of anxiety, so proceeded with gastrografin XR esophagram for evaluation, which is completely normal. XR Gastrografin Esophagram 1/13/2022 with normal esophageal motility, no strictures or masses, no hiatal hernia, patent gastroesophageal junction, absence of gastroesophageal reflux, no definite esophageal perforation identified. CT 1/10/2023 also reassuring without acute etiology for her chest and epigastric pain; noted \"indeterminant hypoenhancement along the right hepatic dome compared to recent imaging but appears new since 2021,\" recommending correlation with nonurgent hepatic specific MRI.  MRE 12/27/2022 also reassuring with no finding specific for acute inflammation of the bowel. Appreciate psychiatry consult regarding patient's self-reported anxiety. Patient reported she currently takes maximum dosing of venlafaxine that controls her depression but does not control her anxiety.      # Acute Chest Pain  Reports 4 day history of constant chest pressure with intermittent sharp pain that radiates to between her shoulder blades. Given negative cardiac work-up at this time, differential includes but is not limited to musculoskeletal pain (costochondritis given tenderness with palpation on exam) versus esophageal irritation from repeated vomiting versus other, also complicated by anxiety. Normal XR Gastrografin Esophagram 1/13/2023, negative for esophageal perforation, gastroesophageal reflux absent. Favor continuing supportive cares and conservative management, including PPI 40 mg BID.      Recommendations:  -- Agree with Zofran, GI Cocktail and PRN Atarax for nausea.   -- Noted opioids given for pain management.   - Consider limiting (if possible discontinuing) opioids as known adverse effect of nausea and vomiting. "   -- Continue Pantoprazole 40 mg PO BID for 3 months then can decreased down to PTA dosing: (40 mg PO daily).    - Consider trial of liquid sucralfate QID.   -- Complete abstinence from cannabis.   -- Appreciate Psychiatry consult given patient reporting her anxiety is very high.  -- Appreciate General Surgery input regarding biliary dyskinesia (see 11/11/2023 consult note).  -- Continue Supportive Cares  - Adequate volume resuscitation with IVF, pRBCs.  - Monitor Hemoglobin closely. Transfuse to keep Hgb > 7 g/dL from GI standpoint.   - Monitor Platelets closely. Keep PLT > 50 10e3/uL from GI standpoint.  - Maintain hemodynamics: MAP >65 mmHg and HR <100.  - Monitor and optimize electrolytes.  - Monitor and optimize nutrition. --> Diet per primary team.  - Reposition/Ambulate every 2 hours while awake.    -- No indication for acute GI endoscopic intervention at this time.  -- Avoid NSAIDs.  -- Analgesia/Antiemetics per primary team    Outpatient:  -- Continue to follow-up closely with her established outpatient [facility] team.   -- Close follow-up with her established [facility] team.   - Hepatic specific MRI for further evaluation of indeterminate hypoenhancement along the right hepatic dome on CT 1/10/2023.  -- Consider close follow-up with Psychiatry for optimization of psychiatric pharmacotherapy.      COVID status: Negative 1/11/2023.     The inpatient gastroenterology service will sign off at this time. Thank you for allowing us to participate in the care of this patient. Please do not hesitate to page the GI service with any questions or concerns.      The patient was discussed and plan agreed upon with Dr. Elisa Flanagan, GI Luminal Service staff physician.     Overall time spent on the date of this encounter preparing to see the patient (including chart review of available notes, clinical status events, imaging and labs); discussing, ordering, coordinating recommended medications, tests or procedures;  "communicating with other health care professionals; and documenting the above clinical information in the electronic medical record was 90 minutes.     Chantelle Quintanilla PA-C  Inpatient Gastroenterology Service  [facility]  Text Page    _______________________________________________________________      Subjective: Nursing notes and 24hr events reviewed. No acute events noted, patient requesting dilaudid for chest pain/pressure. Improvement of abdominal pain. Then RN notes no complaint of abdominal pain, only chest discomfort.     Patient seen and examined at 12:15 PM. Psychiatrist Dr. Camacho entered the room as well.     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 Her dad just arrived there today to stay with them.    Patient reports her epigastric abdominal pain and chest pain are both still there, unchanged then started crying, visibly upset, stating, \"No one is listening to me! No one believes me! I'm not a drug addict! I am sick of not feeling good and sick of being in pain! I am only getting 975 mg of Tylenol, and I take more than that at home! I can just take that at home! Just send me home!\" Patient utilized profanity within her statements.     She states she has not been given prednisone since admission. Per MAR, she has received her 1 mg prednisone PO daily.    She states she has not been given her venlafaxine since admission, noting that is why she is emotional and upset. Per MAR, patient has been given 150 mg venlafaxine PO BID since admission.    Discussed Normal XR Esophagram, to which the patient replied: \"So you're telling me nothing is wrong with me?! I know my body! No one listened to me, and I am the one who found my skin cancer! Something is wrong with me!\" Again utilized profanity throughout.     Acknowledged her report of continued pain, noting her imaging is reassuring. Discussed nausea, vomiting, and abdominal pain likely " "multifactorial including medication side-effect, vomiting itself, cannabis hyperemesis syndrome, to which the patient interrupted, \"I did not even want to start the cannabis! You guys prescribed it to me! You did this to me!\"     Reports nausea has improved, still no vomiting.     Encouraged patient to follow-up with her outpatient GI team at [facility] for ongoing care.     Dr. Camacho (psychiatry) remained in the patient's room to continue his assessment.       PTA Rx Adverse Effects per UpToDate:  -- Tirzepatide adverse effects per UpToDate: nausea 12% to 18%, abdominal pain 5% to 6%, vomiting 5% to 9%. --> Though recently discontinued  -- Gabapentin: dyspepsia 1% to 2%, nausea >1%, vomiting ?8%  -- Metformin: nausea and vomiting (IR tablet: 26%; ER tablet: 7%), abdominal pain 7%, dyspepsia (7%)   -- Venlafaxine: nausea (30%), vomiting (4%), Anorexia (8% to 22%)    -- Percocet (Oxycodone-acetaminophen): Nausea (31%), Vomiting (9%), dyspepsia (?1%).    Inpatient Rx Adverse Effects per UpToDate::  -- Dilaudid: frequency not defined: nausea, vomiting   - 2 mg 1/10   - 1.2 mg 1/12  -- Morphine: Abdominal pain (3%), nausea (7%), vomiting (2%)    - 4 mg 1/11  -- Oxycodone: Nausea (extended-release: 11% to 23%; immediate-release: ?3%), vomiting (extended-release: 4% to 21%; immediate-release: 3%), abdominal pain (?5%)    - 5 mg 1/11    ROS:   4 pt ROS negative unless noted in subjective.     Objective:  Blood pressure 104/57, pulse 74, temperature 97.9  F (36.6  C), temperature source Axillary, resp. rate 16, height 1.6 m (5' 3\"), weight 103.3 kg (227 lb 11.2 oz), last menstrual period 01/03/2023, SpO2 93 %, not currently breastfeeding.    General: 47 year old female lying in bed in NAD. Appears upset, crying.   HEENT: Head is AT/NC. Sclera anicteric. Tear-filled eyes bilaterally.  Lungs: No increased work of breathing, speaking in full sentences, equal chest rise. On Room Air.  Heart: Regular rate. Peripheral perfusion " "intact.  Abdomen: Soft, non-distended.  +epigastric pain without rebound or guarding. No peritoneal signs. +ileostomy.   Extremities: WWP.  Musculoskeletal: No gross deformity.  Skin: No jaundice or rash on exposed skin.  Neurologic: Grossly non-focal.  CN 2-12 grossly intact.   Mental status/Psych: A&O. Asks/answers questions appropriately but also responds with intermittent profanity, visibly upset, crying, raising voice during interview.       Date 01/13/23 0700 - 01/14/23 0659   Shift 1234-6078 2133-8165 [id number] 24 Hour Total   INTAKE   I.V. 75   75   Shift Total(mL/kg) 75(0.73)   75(0.73)   OUTPUT   Shift Total(mL/kg)       Weight (kg) 103.28 103.28 103.28 103.28         Previous Procedures:     10/4/2022 - EGD for \"abnormal PET scan of the GI tract\" with Dr. Mauro Bailey - [facility]   Findings:        LA Grade A (one or more mucosal breaks less than 5 mm, not extending        between tops of 2 mucosal folds) esophagitis with no bleeding was found        at the gastroesophageal junction.        Localized mild inflammation was found in the gastric antrum. Biopsies        were taken with a cold forceps for histology. No evidence for recurrent        melanoma.        The examined duodenum was normal.   Post-op Diagnoses:        - LA Grade A esophagitis with no bleeding.        - Gastritis. Biopsied.        - Normal examined duodenum.  Addendum  A Helicobacter pylori immunostain is negative (block A1).     Signed by Ela Laughlin M.D., Ph.D. 10/7/2022 11:54 AM   This test was developed using an analyte specific reagent.   Its performance characteristics were determined by [facility] in a manner consistent with CLIA requirements. This   test has not been cleared or approved by the U.S. Food and   Drug Administration. REVISED RESULTS   Interpretation  FINAL DIAGNOSIS   A.  Stomach, antrum, body, endoscopic biopsy:  Fundic-type   mucosa with mild chronic inflammation and focal active   inflammation.  Antral " mucosa with mild reactive epithelial   change.  No intestinal metaplasia.  An immunohistochemical   stain for Helicobacter pylori is pending and will be   reported in an addendum.             LABS:  BMP  Recent Labs   Lab 01/13/23  0527 01/12/23  1721 01/12/23  1147 01/12/23  0736 01/11/23  1214 01/10/23  1050     --   --  141 139 140   POTASSIUM 3.7  --   --  4.0 4.0 4.3   CHLORIDE 108*  --   --  108* 103 104   PATRICIA 8.1*  --   --  8.6 9.7 9.6   CO2 21*  --   --  21* 22 24   BUN 6.8  --   --  9.1 9.6 10.5   CR 0.68  --   --  0.73 0.80 0.68   * 111* 115* 110* 120* 118*     CBC  Recent Labs   Lab 01/13/23 0527 01/12/23  0736 01/11/23  1214 01/10/23  1050   WBC 7.6 7.2 10.3 10.2   RBC 3.94 3.92 4.61 4.27   HGB 10.7* 10.7* 12.7 11.9   HCT 34.8* 34.7* 40.7 36.3   MCV 88 89 88 85   MCH 27.2 27.3 27.5 27.9   MCHC 30.7* 30.8* 31.2* 32.8   RDW 14.9 14.8 15.0 14.7    354 448 392     INR  Recent Labs   Lab 01/12/23  0736   INR 0.95     LFTs  Recent Labs   Lab 01/13/23 0527 01/12/23  0736 01/11/23  1214 01/10/23  1050   ALKPHOS 66 72 91 89   AST 34 22 26 25   ALT 35 27 34 33   BILITOTAL <0.2 <0.2 0.2 <0.2   PROTTOTAL 5.4* 6.0* 7.2 6.9   ALBUMIN 3.2* 3.5 4.1 3.8      PANC  Recent Labs   Lab 01/11/23  1214 01/10/23  1050   LIPASE 19 21         IMAGING:     XR Gastrografin Esophagram 1/13/2023  FINDINGS: Examination of the esophagus reveals adequate primary and  secondary peristalsis. There were no focal strictures or masses. The  gastroesophageal junction is patent. No hiatal hernia was seen on  examination. There was absence of gastroesophageal reflux.                                                                   IMPRESSION: No definite esophageal perforation identified.      EXAM: NUCLEAR MEDICINE HEPATOBILIARY SCAN WITH GALLBLADDER EJECTION FRACTION  LOCATION: [facility]  DATE/TIME: 1/10/2023 9:58 PM  FINDINGS: Normal radiotracer uptake by the liver and excretion into the  intrahepatic bile ducts, common bile duct and duodenum. The gallbladder fills with radiotracer. On the post-cholecystokinin images, the gallbladder contracts with a measured ejection   fraction of 7% (Normal range = 35% or greater).                                                                    IMPRESSION:  1. The gallbladder fills with radiotracer and there is therefore no scintigraphic evidence of acute cholecystitis.  2. Low gallbladder ejection fraction of 7%. In the appropriate clinical setting, this is suggestive of biliary dyskinesia/dysfunction        ULTRASOUND ABDOMEN LIMITED January 10, 2023 2:22 PM  FINDINGS:     GALLBLADDER: The gallbladder is normal. No gallstones, wall  thickening, or pericholecystic fluid. Negative sonographic Mandel's  sign.     BILE DUCTS: There is no biliary dilatation. The common duct measures 4  mm.     LIVER: Fatty liver. Liver is 17.7 cm. No visible focal hepatic lesion.  The abnormality at CT is unable to be seen.     RIGHT KIDNEY: No hydronephrosis.     PANCREAS: The visualized portions of the pancreas are normal.     No ascites.                                                                  IMPRESSION:  1.  No gallstones. Negative sonographic Mandel's sign.  2.  Fatty liver.     CT CHEST PE, ABDOMEN & PELVIS WITH CONTRAST January 10, 2023 12:31  PM  FINDINGS:  ANGIOGRAM CHEST: Pulmonary arteries are normal caliber and negative  for pulmonary emboli. Thoracic aorta is negative for dissection. No CT  evidence of right heart strain.      LUNGS AND PLEURA: No effusion. No acute airspace disease. Minor  bibasilar atelectasis. No acute airspace disease.     MEDIASTINUM/AXILLAE: No acute abnormality. No suspicious lymph nodes.     CORONARY ARTERY CALCIFICATION: None.     HEPATOBILIARY: There is hypoenhancing nodular and linear region at the  periphery of the right liver, for example segment 7 measuring 2.2 cm  series 10 image 28. This shows stability in the short interval.  This  is clearly demonstrated on an older CT from 6/3/2021. A portion of  this is linear extending superiorly, for example series 10 images 17  through 24. No acute hepatic abnormality. Gallbladder unremarkable.     PANCREAS: Atrophic body and tail. No acute abnormality.     SPLEEN: Normal.     ADRENAL GLANDS: Normal.     KIDNEYS/BLADDER: Normal.     BOWEL: Subtotal colectomy and right abdominal ileostomy appears  stable. Stable parastomal fat-containing hernia. Stable Aureliano  pouch. No acute inflammatory change of the bowel is seen. No abscess  or free air.     LYMPH NODES: Normal.     PELVIC ORGANS: No acute abnormality.     OTHER: None.     MUSCULOSKELETAL: No convincing aggressive or acute osseous  abnormality.                                                                      IMPRESSION:  1.  No evidence for pulmonary embolism, acute aortic abnormality, or  acute airspace disease.  2.  Indeterminate hypoenhancement along the right hepatic dome  compared to recent imaging but this appears new since 2021. As such  this is considered indeterminate. A neoplastic etiology is not  entirely excluded. Recommend correlation with nonurgent hepatic  specific MRI.     Most Recent MRE 12/27/2022  FINDINGS:     Exam quality: Satisfactory.     Bowel: A right paramedian mid abdominal ileostomy is present. A  fat-containing noninflamed parastomal hernia is noted. Patient is  status post subtotal colectomy. A long Mejía's pouch is present.  There is minimal wall thickening and minimal increased enhancement of  the Mejía's pouch, which is nonspecific but favored to be  accentuated by underdistention as no T2 signal abnormality, restricted  diffusion, or adjacent inflammatory stranding is present. No findings  specific for acute inflammation of the small bowel. Specifically, no  small bowel wall thickening, mucosal hyperenhancement, T2 signal  abnormality, or inflammatory stranding. No sign of bowel obstruction  or  stricture. No fistula or abscess is seen. Stomach is  normal-appearing.     Remainder of exam: The liver, gallbladder, spleen, adrenal glands,  kidneys, and urinary bladder are normal-appearing. Similar-appearing  atrophy of the pancreas. Several prominent to mildly enlarged  pericolonic perirectal lymph nodes are noted, which appear stable to  recent comparison and are likely reactive in nature. Nabothian cysts  are seen at the endocervical junction. No ascites. No acute osseous  abnormality.                                                                      IMPRESSION:   1. Status post subtotal colectomy with right paramedian mid abdominal  ileostomy and long Mejía's pouch.  2. No findings specific for acute inflammation of the bowel.  3. Multiple prominent pericolonic/perirectal lymph nodes, similar to  comparison.  4. Fat-containing noninflamed parastomal hernia.   5. Pancreatic atrophy.        PET Scan at [facility] 6/2/2022  Impression       1. Focal activity in the stomach likely represents physiologic or inflammatory activity, however   submucosal metastases are a remote consideration. Endoscopy may be beneficial to more definitively   assess.   2. No evidence for recurrent or metastatic disease elsewhere.  Narrative     EXAM:  PET CT SKULL TO THIGH FDG     Serum glucose at time of F-18 FDG injection was 118 mg/dL. Patient followed standard dietary/fasting   requirements for this exam.     RADIOPHARMACEUTICAL/MEDS:   Route: intravenous   fludeoxyglucose F 18 injection USP (FDG F-18),10.05 millicurie     TECHNIQUE:  F18 FDG PET/CT scan was performed from the vertex through the knees with low dose,   non-contrast, free-breathing CT images for attenuation correction and anatomic localization (AC/AL),   with imaging beginning at approximately 60 minutes after radiotracer injection.     COMPARISON:  PET/CT 12/21/2021     INDICATION:  Metastatic melanoma involving the left axilla diagnosed 2017. Treated with  resection   and axillary lymphadenectomy with subsequent immunotherapy. Subsequent treatment strategy.     The patient reports no recent vaccinations.     FINDINGS:   Postoperative changes in the left axilla for resection of malignant melanoma. No evidence for local   recurrence.     No enlarged or significantly FDG avid lymph nodes throughout.     Focal areas of activity in the gastric fundus (image 162), gastric antrum (image 175) and the   pylorus (image 181) are indeterminate. The most FDG avid focus in the fundus demonstrates an SUV max   of 6.4. Findings likely inflammatory/physiologic in etiology, however submucosal metastasis is a   remote possibility.     Status post subtotal colectomy with ileostomy in the right lower quadrant. Diffuse activity   throughout the small bowel is likely physiologic.     Significant, incidental findings on the low-dose, noncontrast CT:   Mucous retention cyst in the right maxillary sinus. Parastomal hernia.

## 2023-01-13 NOTE — PROGRESS NOTES
Observation goals : PER UNIT ROUTINE  Comments :,Improvement of abdominal pain: No complain of abdominal pain. Only chest discomfort.: Pt had GI Cocktail and scheduled Tylenol and she has been sleeping good.

## 2023-01-13 NOTE — CONSULTS
"Triage and Transition - Consult and Liaison     Doretha Yu  January 13, 2023    Session start: 10:00 am  Session end: 10:36 am  Session duration in minutes: 36  CPT utilized: 12907 - Brief diagnostic assessment (modifier 52)  Patient was seen virtually (Profista cart or other teleconferencing device).    Diagnosis:   Adjustment Disorders  309.28 (F43.23) With mixed anxiety and depressed mood, present and by hx.;  300.02 (F41.1) Generalized Anxiety Disorder, present and by hx.;       Plan/Recommendations:     Continue care coordination.    Will ask psychiatry provider to follow up with pt rgarding med review, pt requesting additional, left message for psyciatric provider.    Maintain current transition plan. Next steps include: Psychiatric provider follow up.    Pt expresses desire in OP mental health therapy, appt set up as follows:    Date: Friday, 1/27/2023  Time: 9:00 am - 10:00 am  Provider: Chantelle Hightower MA, LP  Location: [facility], [address] [place] [address]  Phone: [phone]  Type: Teletherapy      Please enter another psychiatry if further visits are needed. Patients are not followed by Psychiatry C&L Service unless otherwise indicated.         Reason for consult: Psychiatry consult was requested due to \"severe anxiety contributing to nausea\". Patient was seen by Triage and Transition Consult & Liaison team.     Identifying information: Doretha is 47 year old White  female   followed related to \"severe anxiety contributing to nausea\".     Summary of Patient Situation  Pt agreeable to meet via  today.  Pt sitting on bed.  Pt at first questioning why consult, then discusses hx of anxiety and depression.  She states her mood as \"annoyed by cares\", tearful at times.  Per chart review pt with a hx of SANDRA, MDD, and Adjustment disorder.    Today pt appears to be presenting with adjustment anxiety and depression sx in the setting of stressors and medical " "condition.  She reports she is anxious regarding her medical condition.  Anxiety and depression sx including excessive worry, intermittent sadness, and difficulty controlling worry.  Pt immediately talks about heightened sx while in hospital.  She talks about being \"annoyed with her care at hospital\".  She denies any SI,HI,AH/VH.  Per chart review she does not appear to endorse a psych hosp hx, with information available in epic ehr at the time of this assessment.  She reports she is hopeful and looking forward to feeling better.    She reports she is not currently seeing a therapist, however expresses desire in doing so, appt set up in plan portion of this note.  She reports she takes effexor, that is prescribed by her PCP.  She talks about her perceived level of med helpfulness, she states it is not working that well currently, she is requesting a med review and possible additional, as she states \"maybe something on top of the effexor\".      She reports having adequate supports including her sister and some close friends.  She talks about historical coping skills including \"grounding self and positive self talk\".  Clinician psychoeducated on additional including deep breathing and meditation.             Collateral information:   Reviewed chart, coordinated with care team and RN and coordinated with psychiatric provider for pt follow up, left message.    Mental Status Exam   Affect: Appropriate  Appearance: Appropriate   Attention Span/Concentration: Attentive    Eye Contact: Engaged  Fund of Knowledge: Appropriate   Language /Speech Content: Fluent  Language /Speech Volume: Normal   Language /Speech Rate/Productions: Normal   Recent Memory: Intact  Remote Memory: Intact  Mood: Anxious   Orientation:   Person: Yes   Place: Yes  Time of Day: Yes   Date: Yes   Situation (Do they understand why they are here?): Yes   Psychomotor Behavior: Normal   Thought Content: Clear  Thought Form: Intact    Current medications: " Per EHR  Current Facility-Administered Medications   Medication     acetaminophen (TYLENOL) tablet 975 mg     [Held by provider] acetaminophen (TYLENOL) tablet 975 mg     albuterol (PROVENTIL HFA/VENTOLIN HFA) inhaler     calcium carbonate (TUMS) chewable tablet 500 mg     glucose gel 15-30 g    Or     dextrose 50 % injection 25-50 mL    Or     glucagon injection 1 mg     diclofenac (VOLTAREN) 1 % topical gel 4 g     ferrous fumarate 65 mg ([place]. FE)-Vitamin C 125 mg (VITRON C) tablet 1 tablet     gabapentin (NEURONTIN) tablet 600 mg     hydrOXYzine (ATARAX) tablet 50 mg     lidocaine (LMX4) cream     lidocaine (viscous) (XYLOCAINE) 2 % 15 mL, alum & mag hydroxide-simethicone (MAALOX) 15 mL GI Cocktail     lidocaine 1 % 0.1-1 mL     melatonin tablet 3 mg     metFORMIN (GLUCOPHAGE) tablet 1,000 mg     methocarbamol (ROBAXIN) tablet 1,000 mg     naloxone (NARCAN) injection 0.2 mg    Or     naloxone (NARCAN) injection 0.4 mg    Or     naloxone (NARCAN) injection 0.2 mg    Or     naloxone (NARCAN) injection 0.4 mg     ondansetron (ZOFRAN ODT) ODT tab 4 mg    Or     ondansetron (ZOFRAN) injection 4 mg     ondansetron (ZOFRAN) injection 4 mg     pantoprazole (PROTONIX) IV push injection 40 mg     polyethylene glycol (MIRALAX) Packet 17 g     predniSONE (DELTASONE) tablet 1 mg     prochlorperazine (COMPAZINE) injection 10 mg    Or     prochlorperazine (COMPAZINE) tablet 10 mg    Or     prochlorperazine (COMPAZINE) suppository 25 mg     simethicone (MYLICON) chewable tablet  mg     sodium chloride (PF) 0.9% PF flush 3 mL     sodium chloride (PF) 0.9% PF flush 3 mL     sodium chloride 0.9% infusion     venlafaxine (EFFEXOR) tablet 150 mg     Facility-Administered Medications Ordered in Other Encounters   Medication     lidocaine 1 % 9 mL     sodium bicarbonate 8.4 % injection 1 mEq        Therapeutic intervention and progress:  Therapeutic intervention consisted of building therapeutic rapport, active listening,  validation, thought reframing, engaging in learning/practicing coping skills, normalizing and CBT concepts. Patient is making progress towards treatment goals as evidenced by pt engaged in session, willingness to engage in therapy.      Serina Bess MSW, LGSW  Triage and Transition - Consult and Liaison   [phone]

## 2023-01-13 NOTE — DISCHARGE SUMMARY
[facility]  Discharge Summary - Medicine & Pediatrics       Date of Admission:  1/11/2023  Date of Discharge:  1/13/2023   Discharging Provider: Dr. Lowell Pérez  Discharge Service: Medicine Service, JASMIN TEAM 4    Discharge Diagnoses   #Acute on chronic abdominal pain   #Acute on chronic N/V    #Gastritis, esophagitis   #Cannabinoid Use   Gallbladder dysfunction (EF 7%)   #UC s/p Ileostomy and TAC   #Non-cardiac Chest Pain   #Costochondritis  #Hx of malignant melanoma with no primary tumor identified    #Vertebral Tenderness   #Anxiety and Depression    Follow-ups Needed After Discharge   Please see PCP within 1-2 weeks for hospital follow-up   Please see GI at [facility] for hospital follow-up    Unresulted Labs Ordered in the Past 30 Days of this Admission     No orders found from 12/12/2022 to 1/12/2023.      These results will be followed up by n/a    Discharge Disposition   Discharged to home  Condition at discharge: Stable      Hospital Course   Doretha Yu is a 47 year old female with PMH significant for GERD, asthma, metastatic malignant melanoma, anxiety, obesity, STORMY, diabetes, UC s/p TAC with end ileostomy, and RA here for acute on chronic abdominal pain with nausea and vomiting. Symptoms most consistent with non-bleeding gastritis and esophagitis.  The following problems were addressed during her hospitalization:    #Acute on chronic abdominal pain   #Acute on chronic N/V    #Gastritis, esophagitis   #Cannabinoid Use   Presented with acute on chronic epigastric and chest discomfort in setting of frequent nausea and vomiting.  CT unremarkable for acute illness & GI cocktail provide some relief in ER. CMP unremarkable. Lipase negative. No evidence of cholecystitis or cholelithiasis. CT at [facility] unremarkable. No leukocytosis. Recent EGD at [place] with gastritis and esophagitis w/o ulceration. H Pylori negative. GI consulted for assistance in care and  agree symptoms most consistent with acute flare of gastritis and esophagitis likely 2/2 frequent emesis. Patient timeline of nausea, vomiting, and pain inconsistent during admission but suspect polypharmacy with multiple medications contributing to nausea. Recently discontinued GLP-1 inhibitor with improvement. Also suspect possible cannabis contribution.     She was started on IV PPI 40 mg twice daily and pain management (acetaminophen, GI cocktail). Given IV fluids for dehydration with improved volume status. Attempted to avoid IV narcotics and provided education as how may paradoxically increase GI pain. Patient very anxious that she may have esophageal tear, gastrograffin XR obtained and negative for tear or rupture. Patient remained frustrated with care provided and persistent pain. Discussed care plan with patient on HD 2 and felt all ongoing mangement could be completed as outpatient. Patient preferred to discharge home.   - Cont pantoprazole 40 mg BID 8 -12 weeks   - Carafate QID prn   - zofran Q6H prn   - Avoid opioids, cannabis   - Please follow-up with GI, recommend continue to see [facility] provider for continuity of care     #Non-cardiac Chest Pain   #Costochondritis  #Hx of malignant melanoma   #Vertebral Tenderness   Normal troponin. EKG unremarkable. No history or evidence of trauma or recent surgical procedures. Exquisite tenderness over the chest and back, mostly on thoracic vertebra with initial concern for malignant process, although not evident on CT. Likely chest wall pain and costochondritis from frequent emesis. Also suspect referred pain from esophagitis and gastritis as described above. She was treated with scheduled non-opiate analgesia and given several doses of opiate analgesia as needed which did not seem to help.   - continue as needed diclofenac gel, methocarbamol (limited rx provided at discharge, follow-up with PCP recommended)   - Recommend avoid opioids     #Anxiety and MDD,  chronic   #Acute adjustment disorder with mixed mood   Patient experiencing severe anxiety in relation to ongoing symptoms and health care needs. Repeatedly stated anxiety is out of control to provider. Strongly suspect that anxiety contributing nausea, vomitng, and pain via somatization. Health psychiatry consulted during stay and will coordinate continued therapy visits as outpatient. Psychiatry consulted and recommend short term prescription for seroquel at home while working to coordinate psychiatry follow-up.   -Seroquel 25 mg PO twice daily prn, prescription for 10 tabs provided   - Adult mental health referral   -Continue PTA Venlefaxine     Gallbladder dysfunction (EF 7%)   [facility]. Negative sonographic russell sign, HIDA with delayed emptying. Discussed with GI and general surgery and felt that patient primary pain/concerns were not consistent with biliary colic. Per surgery will not proceed with cholecystectomy at this time due to location of ileostomy and potential risks of procedure   - Continue to follow, consider elective cholecystectomy at time of ileostomy take down       #UC s/p Ileostomy and TAC   No active issues. Stoma pink and intact. Adequate output and transit time.       #Anxiety and Depression    Consultations This Hospital Stay   NURSING TO CONSULT FOR VASCULAR ACCESS CARE IP CONSULT  GI LUMINAL ADULT IP CONSULT  CARE MANAGEMENT / SOCIAL WORK IP CONSULT  PSYCHOLOGY ADULT IP CONSULT  PSYCHIATRY IP CONSULT    Code Status   Full Code       The patient was discussed with Dr. Lowell Gary MD  [address] [address]  Phone: [phone]  Fax: [phone]  ______________________________________________________________________    Physical Exam   Vital Signs: Temp: 98.6  F (37  C) Temp src: Oral BP: 99/67 Pulse: 93   Resp: 16 SpO2: 97 % O2 Device: None (Room air)    Weight: 230  lbs 8 oz     General: no acute distress, appears uncomfortable and anxious, teary while talking to provider   HEENT: NC/AT, EOMI, conjunctiva clear, MMM   CVS: normal s1,s2; no murmurs. Warm well perfused   Resp: CTAB, no wheezes or crackles   Abd: Normal active bowel sounds. Soft/non-distended, tender to palpation in epigastrium. No rebound or guarding. Ileostomy in RUQ pink w/o surrounding erythema-output present in bag   Musc: no gross joint abnormalities, moves all extremities. Tenderness with palpation of mid back between scapula  Extremities: no peripheral edema bilaterally   Skin: no skin rashes   Neuro: alert and oriented x4, no gross neurological deficits  Psych: Labile mood, intermittent crying & anger with provider, no SI or HI.       Primary Care Physician   Anna Naidu    Discharge Orders      Adult Mental [facility]  Referral      Reason for your hospital stay    You were admitted to the hospital for abdominal and chest pain. The cause of this pain has been diagnosed as gastritis and esophagitis or inflammation of the stomach and esophagus. You were seen by GI and psychiatry to assist with symptoms and anxiety related to pain. You were started on a twice daily acid blocking medication pantoprazole that should treat the inflammation -- this will likely take several weeks to heal.     You were also given pain mediations while admitted.     For home I would like you to continue taking the pantoprazole (protonix) twice daily. I have also prescribed a medicine called carafate that should help soothe the irritation as well. You can take this medicine up to 4 times a day. Continue to take the zofran and compazine to prevent nausea and vomiting.     Activity    Your activity upon discharge: activity as tolerated     Discharge Instructions    To help your body heal faster I strongly recommend avoiding irritants to the esophagus and stomach.   Please avoid marijuana and smoke inhalation. I recommend  avoiding all alcohol until your symptoms improve if you consume alcoholic beverages.     Our GI team here also reviewed your medications at home, the following may contribute to your nausea & pain. We recommend limiting the use of them if possible:   -Percocet   -Gabapentin (discuss with primary care prior to stopping)     Adult [facility] Follow-up and recommended labs and tests    Follow up with primary care provider, Anna Naidu, within 7 days for hospital follow- up.  No follow up labs or test are needed.    Follow up with GI , at [facility], within 1 month  to evaluate treatment change and for hospital follow- up. No follow up labs or test are needed.    Appointments on [place] and/or [facility] (with [facility] or [facility] provider or service). Call [phone] if you haven't heard regarding these appointments within 7 days of discharge.     Diet    Follow this diet upon discharge: Orders Placed This Encounter      Combination Diet Regular Diet Adult; Moderate Consistent Carb (60 g CHO per Meal) Diet       Significant Results and Procedures   Most Recent 3 CBC's:Recent Labs   Lab Test 01/13/23 0527 01/12/23 0736 01/11/23  1214   WBC 7.6 7.2 10.3   HGB 10.7* 10.7* 12.7   MCV 88 89 88    354 448     Most Recent 3 BMP's:Recent Labs   Lab Test 01/13/23  1331 01/13/23 0527 01/12/23  1721 01/12/23  1147 01/12/23  0736 01/11/23  1214   NA  --  140  --   --  141 139   POTASSIUM  --  3.7  --   --  4.0 4.0   CHLORIDE  --  108*  --   --  108* 103   CO2  --  21*  --   --  21* 22   BUN  --  6.8  --   --  9.1 9.6   CR  --  0.68  --   --  0.73 0.80   ANIONGAP  --  11  --   --  12 14   PATRICIA  --  8.1*  --   --  8.6 9.7   * 110* 111*   < > 110* 120*    < > = values in this interval not displayed.     Most Recent 2 LFT's:Recent Labs   Lab Test 01/13/23 0527 01/12/23 0736   AST 34 22   ALT 35 27   ALKPHOS 66 72   BILITOTAL <0.2 <0.2   ,   Results for orders placed or performed during the hospital encounter of  01/11/23   XR Gastrografin Esophagram    Narrative    Esophagram dated 1/13/2023    COMPARISON:    None    HISTORY:    47 year old female with recurrent nausea/vomiting, rule  out esophageal tear    TECHNIQUE: The patient was given Omnipaque in the left posterior  oblique and right posterior oblique standing positions. The patient  was then given thin barium in standing and supine positions.    FINDINGS: Examination of the esophagus reveals adequate primary and  secondary peristalsis. There were no focal strictures or masses. The  gastroesophageal junction is patent. No hiatal hernia was seen on  examination. There was absence of gastroesophageal reflux.      Impression    IMPRESSION: No definite esophageal perforation identified.    Fluoroscopy time was 1.3 minutes.    I have personally reviewed the examination and initial interpretation  and I agree with the findings.    JEREMIAS ONEILL MD         SYSTEM ID:  [id number]     *Note: Due to a large number of results and/or encounters for the requested time period, some results have not been displayed. A complete set of results can be found in Results Review.       Discharge Medications   Discharge Medication List as of 1/13/2023  5:05 PM      START taking these medications    Details   diclofenac (VOLTAREN) 1 % topical gel Apply 4 g topically 4 times daily, Disp-150 g, R-0, E-Prescribe      methocarbamol 1000 MG TABS Take 1,000 mg by mouth 3 times daily as needed for muscle spasms, Disp-21 tablet, R-0, E-Prescribe      prochlorperazine (COMPAZINE) 10 MG tablet Take 1 tablet (10 mg) by mouth every 6 hours as needed for vomiting, Disp-10 tablet, R-0, E-Prescribe      QUEtiapine (SEROQUEL) 25 MG tablet Take 1 tablet (25 mg) by mouth 2 times daily as needed (Anxiety), Disp-10 tablet, R-0, E-Prescribe      sucralfate (CARAFATE) 1 GM tablet Take 1 tablet (1 g) by mouth 4 times daily as needed for nausea (esophagitis pain), Disp-21 tablet, R-1, E-Prescribe         CONTINUE  these medications which have CHANGED    Details   hydrOXYzine (ATARAX) 25 MG tablet Take 1-2 tablets (25-50 mg) by mouth every 6 hours as needed for anxiety, Disp-40 tablet, R-0, E-Prescribe      ondansetron (ZOFRAN ODT) 4 MG ODT tab Take 1 tablet (4 mg) by mouth every 8 hours as needed for nausea or vomiting, Disp-10 tablet, R-1, E-Prescribe      pantoprazole (PROTONIX) 40 MG EC tablet Take 1 tablet (40 mg) by mouth 2 times daily for 90 days, Disp-180 tablet, R-0, E-Prescribe      predniSONE (DELTASONE) 1 MG tablet Take 1 tablet (1 mg) by mouth daily Taper 1 mg every 2 weeks    Continue taper as previously instructed. Current dose 1 mg daily, Disp-10 tablet, R-0, No Print Out         CONTINUE these medications which have NOT CHANGED    Details   acetaminophen (TYLENOL) 325 MG tablet Take 3 tablets (975 mg) by mouth every 8 hours As scheduled for the next 7-10 days, then decrease both dose & frequency to as needed there after., Disp-90 tablet, R-0, E-Prescribe      albuterol (PROAIR HFA/PROVENTIL HFA/VENTOLIN HFA) 108 (90 Base) MCG/ACT inhaler Inhale 2 puffs into the lungs every 4 hours as needed for shortness of breath / dyspnea, Disp-18 g, R-1, E-Prescribe      Calcium Carb-Cholecalciferol 600-800 MG-UNIT TABS Take 800 mg by mouth every morning (before breakfast), Disp-90 tablet, R-3, E-Prescribe      calcium carbonate (TUMS) 500 MG chewable tablet Take 1 tablet (500 mg) by mouth 3 times daily as needed for heartburn, Disp-30 tablet, R-0, E-Prescribe      Cholecalciferol (VITAMIN D3) 25 MCG TABS TAKE THREE TABLETS BY MOUTH ONCE DAILY, Disp-300 tablet, R-1, E-Prescribe      cyanocobalamin (VITAMIN B-12) 1000 MCG tablet Take 1 tablet (1,000 mcg) by mouth daily, Historical      gabapentin (NEURONTIN) 600 MG tablet TAKE ONE TABLET (600MG) BY MOUTH FOUR TIMES A DAY, Disp-120 tablet, R-5, E-Prescribe      loperamide (IMODIUM A-D) 2 MG tablet Take 2 tabs daily and increase as needed until output is apple sauce  consistency. Max of 8 tabs (16 mg) per day., Disp-240 tablet, R-3, E-Prescribe      medical cannabis (Patient's own supply) See Admin Instructions (The purpose of this order is to document that the patient reports taking medical cannabis.  This is not a prescription, and is not used to certify that the patient has a qualifying medical condition.), Historical      metFORMIN (GLUCOPHAGE) 500 MG tablet Take 2 tablets (1,000 mg) by mouth 2 times daily (with meals) ** VISIT DUE/LAST FILL **, Disp-360 tablet, R-0, E-Prescribe      simethicone (MYLICON) 80 MG chewable tablet Take 1-2 tablets ( mg) by mouth 4 times daily as needed for cramping, Disp-120 tablet, R-0, E-Prescribe      venlafaxine (EFFEXOR) 75 MG tablet TAKE 2 TABLETS (150 MG) BY MOUTH 2 TIMES DAILY, Disp-120 tablet, R-3, E-Prescribe      VITRON-C  MG TABS tablet Take 1 tablet by mouth daily, Disp-30 tablet, R-1, RORY, Historical      Ostomy Supplies MISC 20 each daily, Disp-20 each, R-11, [employer] Precut 14504  x20 each Keith  32402 pouch ,  x20 each 232206 Sarah ring   x20 each  7760 adhesive remover,  X 1 box  7299 belt   x1 each Change barrier and pouch 3xweek      oxyCODONE-acetaminophen (PERCOCET) 5-325 MG tablet Take 1 tablet by mouth every 6 hours as needed for severe pain (7-10), Disp-12 tablet, R-0, [employer]           Allergies   Allergies   Allergen Reactions     Bee Venom Swelling     Azithromycin Diarrhea     Erythromycin      Other reaction(s): GI intolerance, Vomiting     Fentanyl Other (See Comments)     sweating  sweating     Prochlorperazine Fatigue     Other reaction(s): Other (see comments)  Fatigue     Buspirone      Other reaction(s): GI intolerance  vomiting     Erythrocin Nausea and Vomiting     Topamax [Topiramate]      Made her lethargic     Zithromax [Azithromycin Dihydrate] Diarrhea     Enbrel [Etanercept] Hives and Rash

## 2023-01-13 NOTE — PLAN OF CARE
Patient vital signs stable. She has been crying and frustrated at times today from her pain and not having answers what's causing the pain.  She has been getting IV dilaudid every four hours and rates her pain a 7 before getting the drug and a 5 when she has been medicated. She describes her pain as crushing/pressure, when she eats she feels her food is stuck in her esophagus.. She was unable to eat breakfast,  but did manage to eat most of her lunch this afternoon, and ate 90% of her supper tonight.  She manages her illeostomy on her own. She was made an observation patient this morning,  no goals ordered today after paging treatment team multiple times today for goals. One goal entered at 18:30 tonight improvement of abdominal pain as a goal.  Continue to monitor.  Problem: Plan of Care - These are the overarching goals to be used throughout the patient stay.    Goal: Plan of Care Review  Description: The Plan of Care Review/Shift note should be completed every shift.  The Outcome Evaluation is a brief statement about your assessment that the patient is improving, declining, or no change.  This information will be displayed automatically on your shift note.  Outcome: Not Progressing   Goal Outcome Evaluation:

## 2023-01-13 NOTE — PLAN OF CARE
Goal Outcome Evaluation:     Observation goals per unit Routine:   Comments : Improvement of abdominal pain : No complain of abdominal pain

## 2023-01-13 NOTE — PROVIDER NOTIFICATION
Provider on call Vernon Clayton MD was notified via pager #[phone] that pt wanted some dilaudid for pain rated 7/10 in her chest.

## 2023-01-13 NOTE — UTILIZATION REVIEW
"  Concurrent stay review; Secondary Review Determination      Under the authority of the Utilization Management Committee, the utilization review process indicated a secondary review on the above patient. The review outcome is based on review of the medical records, discussions with staff, and applying clinical experience noted on the date of the review.      (x) Observation Status Appropriate - Concurrent stay review      RATIONALE FOR DETERMINATION:      47 year old female with PMH significant for GERD, asthma, metastatic malignant melanoma, anxiety, obesity, STORMY, diabetes, UC s/p TAC with end ileostomy, and RA here for acute on chronic abdominal pain with nausea and new onset costochondritis    Vital signs unremarkable.  No fever.  No hypoxia    Labs unremarkable.  WBC count normal.  LFTs normal.  Lipase normal    Gastrograffin study of esophagus shows \"No definite esophageal perforation identified.\"    Surgery and gastroenterology did not recommend any inpatient work-up or surgery, chest pain was felt to be noncardiac likely costochondritis per documentation.  GI service has now signed off.      The patient is receiving oral medications for pain management.      I agree with previous UR review on 1/12/23 that observation status is appropriate.       Patient is clinically improving and there is no clear indication to change patient's status to inpatient. The severity of illness, intensity of service provided, expected LOS and risk for adverse outcome make the care appropriate for observation.      The information on this document is developed by the utilization review team in order for the business office to ensure compliance. This only denotes the appropriateness of proper admission status and does not reflect the quality of care rendered.   The definitions of Inpatient Status and Observation Status used in making the determination above are those provided in the CMS Coverage Manual, Chapter 1 and Chapter 6, " section 70.4.      Sincerely,      Cezar Whittington MD  Utilization Review   Physician Advisor   [facility].

## 2023-01-13 NOTE — PROGRESS NOTES
Chantelle Damico RN  Registered Nurse  Blood and Marrow Transplantation  Plan of Care  Addendum  Date of Service:  1/12/2023  8:00 PM  Creation Time:  1/13/2023  6:32 AM     Addendum        Goal Outcome Evaluation:      Observation goals per unit Routine:   Comments : Improvement of abdominal pain : No complain of abdominal pain                    Revision History

## 2023-01-13 NOTE — CONSULTS
"Triage and Transition - Consult and Liaison     Doretha Yu  January 13, 2023    9:05 am:  Psychiatry consult acknowledged. Clinician called unit in attempts to see pt today via , care team states they will set up ipad.    9:25 am: Clinician called back as no pt on ipad, care team states pt now at xray off floor currently\".    Consult unable to be completed due to pt unavailable currently.     9:30 am:  Care team states will call writer back when pt able to be seen today before 3pm.    10:15 am : care team coordination, care team states setting up ipad now.    Serina Bess MSW, LGSW  Triage and Transition - Consult and Liaison   [phone]    "

## 2023-01-14 NOTE — PROGRESS NOTES
"BP 99/67 (BP Location: Right arm)   Pulse 93   Temp 98.6  F (37  C) (Oral)   Resp 16   Ht 1.6 m (5' 3\")   Wt 104.6 kg (230 lb 8 oz)   LMP 01/03/2023 (Approximate)   SpO2 97%   BMI 40.83 kg/m      Afebrile; VSS on RA. Pain controlled with scheduled Tylenol, PRN Robaxin, and PRN Atarax. Pt medically ready for discharge. PIV removed. Provided with AVS, discharge instructions, medication information. All questions answered.     Observation goals:  1. Improvement of abdominal pain: Met.    Pt discharged to: Home.  Via: Private car.   Time: 1600.   Reason not before 11am: Observation goal not met; not medically ready.   Accompanied by: Self.   Belongings: With pt.   Teaching: Per unit protocol.     "

## 2023-01-15 LAB — HOLD SPECIMEN: NORMAL

## 2023-01-16 ENCOUNTER — PATIENT OUTREACH (OUTPATIENT)
Dept: CARE COORDINATION | Facility: CLINIC | Age: 48
End: 2023-01-16

## 2023-01-16 NOTE — PROGRESS NOTES
[facility] Care Resource Center Contact  [facility]/Voicemail     Clinical Data: Transitional Care Management Outreach     Outreach attempted x 2.  Left message on patient's voicemail, providing [facility]'s 24/7 scheduling and nurse triage phone number [phone] ([phone]) for questions/concerns and/or to schedule an appt with an [facility] provider, if they do not have a PCP.      Plan:  [facility] will do no further outreaches at this time.       Janee Phan RN  Connected Care Resource [place], [facility]    *Connected Care Resource Team does NOT follow patient ongoing. Referrals are identified based on internal discharge reports and the outreach is to ensure patient has an understanding of their discharge instructions.

## 2023-01-17 NOTE — TELEPHONE ENCOUNTER
"Spoke with Doretha to follow up on phone encounter from 1/11/23. She states \"I was there for 3 days and no one help me.\" ... \"I will find another GI Provider\"  "

## 2023-01-19 NOTE — PROGRESS NOTES
Prior Authorization Approval    Ondansetron 4mg ODT  Date Initiated: 1/13/2023  Date Completed: 1/13/2023  Prior Auth Type: Quantity Limit                Status: Approved    Effective Date: 12/14/2022 - 07/12/2023  Copay:       Filling Pharmacy: [facility] - [place], [place] - [address] [place] [address]    Insurance: [employer] - Phone [phone] Fax [phone]  ID: [id number]  [facility] Serrano/Case Number: [id number] / [id number]   Submitted Via: Alyse Hansen  [facility] Pharm[facility] Liaison  Ph: [phone] Pager: [phone]

## 2023-01-19 NOTE — PROGRESS NOTES
Prior Authorization Approval    Ondansetron 4mg ODT  Date Initiated: 1/13/2023  Date Completed: 1/13/2023  Prior Auth Type: Quantity Limit                Status: Approved    Effective Date: 01/01/2023 - 01/14/2024  Copay: 1.00     Filling Pharmacy: [facility] - [place], [place] - [address] [place] [address]    Insurance: [facility] - Phone [phone] Fax [phone]  ID: [id number]  [facility] Key/Case Number: [id number] / [id number]   Submitted Via: Alyse Hansen  [facility] Pharmacy Liaison  Ph: [phone] Pager: [phone]

## 2023-01-19 NOTE — PROGRESS NOTES
Prior Authorization Approval    Pantoprazole 40mg tabs  Date Initiated: 1/13/2023  Date Completed: 1/13/2023  Prior Auth Type: Quantity Limit                Status: Approved    Effective Date: 01/14/2022 - 01/14/2023  Copay: 1.00     Filling Pharmacy: [facility] - [place], [place] - [address] [place] [address]    Insurance: [facility] - Phone [phone] Fax [phone]  ID: [id number]  [facility] Key/Case Number: [id number] / [id number]   Submitted Via: Alyse Hansen  [facility] Pharm[facility] Liaison  Ph: [phone] Pager: [phone]

## 2023-02-07 ENCOUNTER — E-VISIT (OUTPATIENT)
Dept: FAMILY MEDICINE | Facility: CLINIC | Age: 48
End: 2023-02-07
Payer: COMMERCIAL

## 2023-02-07 DIAGNOSIS — N39.0 ACUTE UTI (URINARY TRACT INFECTION): Primary | ICD-10-CM

## 2023-02-07 PROCEDURE — 99421 OL DIG E/M SVC 5-10 MIN: CPT | Performed by: PHYSICIAN ASSISTANT

## 2023-02-07 RX ORDER — NITROFURANTOIN 25; 75 MG/1; MG/1
100 CAPSULE ORAL 2 TIMES DAILY
Qty: 10 CAPSULE | Refills: 0 | Status: SHIPPED | OUTPATIENT
Start: 2023-02-07 | End: 2023-02-12

## 2023-02-07 NOTE — PATIENT INSTRUCTIONS
Dear Doretha Fernandez    After reviewing your responses, I've been able to diagnose you with a urinary tract infection, which is a common infection of the bladder with bacteria.  This is not a sexually transmitted infection, though urinating immediately after intercourse can help prevent infections.  Drinking lots of fluids is also helpful to clear your current infection and prevent the next one.      I have sent a prescription for antibiotics to your pharmacy to treat this infection.    It is important that you take all of your prescribed medication even if your symptoms are improving after a few doses.  Taking all of your medicine helps prevent the symptoms from returning.     If your symptoms worsen, you develop pain in your back or stomach, develop fevers, or are not improving in 5 days, please contact your primary care provider for an appointment or visit any of our convenient Walk-in or Urgent Care Centers to be seen, which can be found on our website here.    Thanks again for choosing us as your health care partner,    Carlene Robertson PA-C    Urinary Tract Infections in Women  Urinary tract infections (UTIs) are most often caused by bacteria. These bacteria enter the urinary tract. The bacteria may come from inside the body. Or they may travel from the skin outside the rectum or vagina into the urethra. Female anatomy makes it easy for bacteria from the bowel to enter a woman s urinary tract, which is the most common source of UTI. This means women develop UTIs more often than men. Pain in or around the urinary tract is a common UTI symptom. But the only way to know for sure if you have a UTI for the healthcare provider to test your urine. The two tests that may be done are the urinalysis and urine culture.     Types of UTIs    Cystitis. A bladder infection (cystitis) is the most common UTI in women. You may have urgent or frequent need to pee. You may also have pain, burning when you pee, and bloody  urine.    Urethritis. This is an inflamed urethra, which is the tube that carries urine from the bladder to outside the body. You may have lower stomach or back pain. You may also have urgent or frequent need to pee.    Pyelonephritis. This is a kidney infection. If not treated, it can be serious and damage your kidneys. In severe cases, you may need to stay in the hospital. You may have a fever and lower back pain.    Medicines to treat a UTI  Most UTIs are treated with antibiotics. These kill the bacteria. The length of time you need to take them depends on the type of infection. It may be as short as 3 days. If you have repeated UTIs, you may need a low-dose antibiotic for several months. Take antibiotics exactly as directed. Don t stop taking them until all of the medicine is gone. If you stop taking the antibiotic too soon, the infection may not go away. You may also develop a resistance to the antibiotic. This can make it much harder to treat.   Lifestyle changes to treat and prevent UTIs   The lifestyle changes below will help get rid of your UTI. They may also help prevent future UTIs.     Drink plenty of fluids. This includes water, juice, or other caffeine-free drinks. Fluids help flush bacteria out of your body.    Empty your bladder. Always empty your bladder when you feel the urge to pee. And always pee before going to sleep. Urine that stays in your bladder can lead to infection. Try to pee before and after sex as well.    Practice good personal hygiene. Wipe yourself from front to back after using the toilet. This helps keep bacteria from getting into the urethra.    Use condoms during sex. These help prevent UTIs caused by sexually transmitted bacteria. Also don't use spermicides during sex. These can increase the risk for UTIs. Choose other forms of birth control instead. For women who tend to get UTIs after sex, a low-dose of a preventive antibiotic may be used. Be sure to discuss this option with  your healthcare provider.    Follow up with your healthcare provider as directed. He or she may test to make sure the infection has cleared. If needed, more treatment may be started.  Margot last reviewed this educational content on 7/1/2019 2000-2021 [employer]. All rights reserved. This information is not intended as a substitute for professional medical care. Always follow your healthcare professional's instructions.

## 2023-02-21 NOTE — PROGRESS NOTES
"Colon and Rectal Surgery Video Clinic Note    RE: Doretha Fernandez.  : 1975.  JUNIOR: 2023.    Doretha Fernandez is a 47 year old female who is being evaluated via a billable video visit.      The patient has been notified of following:     \"This video visit will be conducted via a call between you and your physician/provider. We have found that certain health care needs can be provided without the need for an in-person physical exam.  This service lets us provide the care you need with a video conversation.  If a prescription is necessary we can send it directly to your pharmacy.  If lab work is needed we can place an order for that and you can then stop by our lab to have the test done at a later time.    Video visits are billed at different rates depending on your insurance coverage.  Please reach out to your insurance provider with any questions.    If during the course of the call the physician/provider feels a video visit is not appropriate, you will not be charged for this service.\"    Patient has given verbal consent for Video visit? Yes    Video Start Time: 8:05 AM      Reason for visit: follow up    HPI:  Doretha Fernandez is a 46 year old female with ulcerative colitis. She presents today for a follow up to discuss proctectomy and J pouch.  On 6/15/2021 I took her to the OR for a TAC with end ileostomy (laparoscopic). She does currently have a parastomal hernia. Last time I saw Doretha I continued to encourage weight loss and titrating off steroids as I will not be able to create a pouch with Doretha on prednisone. Her upper GI on 10/4/2022 showed mild chronic inflammation in the gastric antrum and LA grade A esophagitis with no bleeding in the gastroesophageal junction. Her MRE in 2022 showed a subtotal colectomy with right paramedian mid abdominal ileostomy and long Mejía's pouch. No findings specific for acute inflammation of the bowel. Multiple prominent pericolonic/perirectal lymph nodes, " "similar to comparison. Fat-containing noninflamed parastomal hernia. Pancreatic atrophy.    She was admitted from 1/11/2023-1/13/2023 for acute on chronic abdominal pain with nausea and vomiting consistent with non-bleeding gastritis and esophagitis which was treated conservatively at the [facility]. It was noted that she has gallbladder dysfunction but surgery was not recommended at that time due to ileostomy location (this was at [facility]).  At the [place] she was not very satisfied with her care and felt like she left \"in the same pain\" as she presented.  She wanted an upper GI scope, but did not receive one.      She then went down to [place].  Upper GI scope was performed that showed some reflux esophagitis.  Biopsies of the esophagus were negative for eosinophilic esophagitis and it is believed the inflammation is due to GERD.  Her pantoprazole was changed to dexlansoprazole by Dr Sanford, her GI MD down in [place].  She also had a biopsy of the second portion of her duodenum which on pathology showed changes possibly consistent with celiac disease.  She has now been on a gluten free diet per Dr Sanford since last week.     Prednisone: off of it since last week. Weight loss: 28 pounds since starting her prior medical weight loss plan.  After much experimentation with weight loss medications she is on phentermine alone for the past week.  She has not had chronic nausea or vomiting for several weeks as it was believed the nausea/vomiting was due to the biologic weight loss medication.    She reports \"pain\" all over, but especially in her shoulders and arms.  She states she is having \"lots\" of ostomy output.  She is not measuring her ileostomy output.    She has a follow up visit scheduled with Dr Sanford at [place] for 3/14/2023.  She remains on cannabinoids via her vape pen.  She was told to remain on this from her physician team (GI?) at [facility] (her report, I did not read this in their " notes).       Upper GI (10/4/2022):  Findings:        LA Grade A (one or more mucosal breaks less than 5 mm, not extending        between tops of 2 mucosal folds) esophagitis with no bleeding was found        at the gastroesophageal junction.        Localized mild inflammation was found in the gastric antrum. Biopsies        were taken with a cold forceps for histology. No evidence for recurrent        melanoma.        The examined duodenum was normal.        Surgical Pathology (10/4/2022):    FINAL DIAGNOSIS   A.  Stomach, antrum, body, endoscopic biopsy:  Fundic-type   mucosa with mild chronic inflammation and focal active   inflammation.  Antral mucosa with mild reactive epithelial   change.  No intestinal metaplasia.  An immunohistochemical   stain for Helicobacter pylori is pending and will be   reported in an addendum       MRE (12/27/2022):  IMPRESSION:   1. Status post subtotal colectomy with right paramedian mid abdominal  ileostomy and long Mejía's pouch.  2. No findings specific for acute inflammation of the bowel.  3. Multiple prominent pericolonic/perirectal lymph nodes, similar to  comparison.  4. Fat-containing noninflamed parastomal hernia.   5. Pancreatic atrophy.    Medications:  Current Outpatient Medications   Medication Sig Dispense Refill     acetaminophen (TYLENOL) 325 MG tablet Take 3 tablets (975 mg) by mouth every 8 hours As scheduled for the next 7-10 days, then decrease both dose & frequency to as needed there after. 90 tablet 0     albuterol (PROAIR HFA/PROVENTIL HFA/VENTOLIN HFA) 108 (90 Base) MCG/ACT inhaler Inhale 2 puffs into the lungs every 4 hours as needed for shortness of breath / dyspnea 18 g 1     Calcium Carb-Cholecalciferol 600-800 MG-UNIT TABS Take 800 mg by mouth every morning (before breakfast) 90 tablet 3     calcium carbonate (TUMS) 500 MG chewable tablet Take 1 tablet (500 mg) by mouth 3 times daily as needed for heartburn 30 tablet 0     Cholecalciferol (VITAMIN D3)  25 MCG TABS TAKE THREE TABLETS BY MOUTH ONCE DAILY 300 tablet 1     cyanocobalamin (VITAMIN B-12) 1000 MCG tablet Take 1 tablet (1,000 mcg) by mouth daily       diclofenac (VOLTAREN) 1 % topical gel Apply 4 g topically 4 times daily 150 g 0     gabapentin (NEURONTIN) 600 MG tablet TAKE ONE TABLET (600MG) BY MOUTH FOUR TIMES A DAY (Patient taking differently: Take 600 mg by mouth daily) 120 tablet 5     loperamide (IMODIUM A-D) 2 MG tablet Take 2 tabs daily and increase as needed until output is apple sauce consistency. Max of 8 tabs (16 mg) per day. 240 tablet 3     medical cannabis (Patient's own supply) See Admin Instructions (The purpose of this order is to document that the patient reports taking medical cannabis.  This is not a prescription, and is not used to certify that the patient has a qualifying medical condition.)       metFORMIN (GLUCOPHAGE) 500 MG tablet Take 2 tablets (1,000 mg) by mouth 2 times daily (with meals) ** VISIT DUE/LAST FILL ** 360 tablet 0     methocarbamol 1000 MG TABS Take 1,000 mg by mouth 3 times daily as needed for muscle spasms 21 tablet 0     ondansetron (ZOFRAN ODT) 4 MG ODT tab Take 1 tablet (4 mg) by mouth every 8 hours as needed for nausea or vomiting 10 tablet 1     Ostomy Supplies MISC 20 each daily 20 each 11     oxyCODONE-acetaminophen (PERCOCET) 5-325 MG tablet Take 1 tablet by mouth every 6 hours as needed for severe pain (7-10) 12 tablet 0     pantoprazole (PROTONIX) 40 MG EC tablet Take 1 tablet (40 mg) by mouth 2 times daily for 90 days 180 tablet 0     predniSONE (DELTASONE) 1 MG tablet Take 1 tablet (1 mg) by mouth daily Taper 1 mg every 2 weeks    Continue taper as previously instructed. Current dose 1 mg daily 10 tablet 0     prochlorperazine (COMPAZINE) 10 MG tablet Take 1 tablet (10 mg) by mouth every 6 hours as needed for vomiting 10 tablet 0     QUEtiapine (SEROQUEL) 25 MG tablet Take 1 tablet (25 mg) by mouth 2 times daily as needed (Anxiety) 10 tablet 0      simethicone (MYLICON) 80 MG chewable tablet Take 1-2 tablets ( mg) by mouth 4 times daily as needed for cramping 120 tablet 0     sucralfate (CARAFATE) 1 GM tablet Take 1 tablet (1 g) by mouth 4 times daily as needed for nausea (esophagitis pain) 21 tablet 1     venlafaxine (EFFEXOR) 75 MG tablet TAKE 2 TABLETS (150 MG) BY MOUTH 2 TIMES DAILY 120 tablet 3     VITRON-C  MG TABS tablet Take 1 tablet by mouth daily 30 tablet 1       Allergies:  Allergies   Allergen Reactions     Bee Venom Swelling     Azithromycin Diarrhea     Erythromycin      Other reaction(s): GI intolerance, Vomiting     Fentanyl Other (See Comments)     sweating  sweating     Prochlorperazine Fatigue     Other reaction(s): Other (see comments)  Fatigue     Buspirone      Other reaction(s): GI intolerance  vomiting     Erythrocin Nausea and Vomiting     Topamax [Topiramate]      Made her lethargic     Zithromax [Azithromycin Dihydrate] Diarrhea     Enbrel [Etanercept] Hives and Rash       Social history:   Social History     Tobacco Use     Smoking status: Former     Packs/day: 1.00     Years: 5.00     Pack years: 5.00     Types: Cigarettes     Quit date: 3/20/1998     Years since quittin.9     Smokeless tobacco: Never   Substance Use Topics     Alcohol use: Not Currently     Marital status: .    ROS:  A complete review of systems was performed with the patient and all systems negative except as per HPI.    Physical Examination:  Virtual visit.  She looks well.  She is in better spirits (not crying, actually laughing and somewhat jovial) than last visit.    Procedures:  None.    ASSESSMENT  48 y/o lady s/p TAC for UC over a year ago, still dealing with some issues prior to being ideal candidate for J pouch, including    1. Celiac disease (possible)  2. Chronic nausea (improving)  3. Non-ideal body weight (started on phentermine last week)    Risks, benefits, and alternatives of operative treatment were thoroughly discussed  with the patient, he/she understands these well and agrees to proceed.    PLAN  1. Ileostomy output - Recommended measuring ostomy output and if >1000 ml over 24 hour period she should start imodium 1 tab bid, increase to 2 tabs bid if not helpful, and if that is not helpful she should call us and we will advise on increasing her dose  2. See me again in April after her March appointment with her GI team at [facility] - she is now gluten free as she was recently diagnosed with celiac disease and her PPI was changed to dexlansoprazole (dexilant?) as she was found to have reflux esophagitis on her 2/2023 EGD at [facility]  3. Continue phentermine for weight loss (Dr Sanford at [facility]); she has had sleeve gastrectomy mentioned to her but has not had a serious conversation about this; she has only been on phetermine for a week (was previously on some biologic agent that gave her esophagitis and chronic nausea/vomiting) and so I would like to see if this is successful for her; if it is not I wonder if she would be a candidate for sleeve gastrectomy; her current BMI makes J pouch formation (and reach) very risky  4. MRE from 12/2022 reviewed - no evidence of small bowel Crohn's disease  5. I will see her again in April 2023; video visit OK      Video-Visit Details    Type of service:  Video Visit    Video End Time (time video stopped): 8:41 AM    Originating Location (pt. Location): Home    Distant Location (provider location):  [facility] [place]     Mode of Communication:  Video Conference via [employer]well    60 minutes spent on the date of the encounter doing chart review, history, review of imaging, documentation ,and further activities as noted above, which includes my time spent on video with the patient/family.         Quinton Ram MD, PhD    Division of Colon and Rectal Surgery  [facility]    Referring Provider:  Miguel Fuentes  DO [place]  [address] [place] [address]     Primary Care Provider:  Anna Naidu

## 2023-02-24 ENCOUNTER — VIRTUAL VISIT (OUTPATIENT)
Dept: SURGERY | Facility: CLINIC | Age: 48
End: 2023-02-24
Payer: COMMERCIAL

## 2023-02-24 VITALS — HEIGHT: 63 IN | BODY MASS INDEX: 38.98 KG/M2 | WEIGHT: 220 LBS

## 2023-02-24 DIAGNOSIS — K51.90 ULCERATIVE COLITIS WITHOUT COMPLICATIONS, UNSPECIFIED LOCATION (H): Primary | ICD-10-CM

## 2023-02-24 PROCEDURE — 99215 OFFICE O/P EST HI 40 MIN: CPT | Mod: VID | Performed by: SURGERY

## 2023-02-24 ASSESSMENT — PAIN SCALES - GENERAL: PAINLEVEL: SEVERE PAIN (7)

## 2023-02-24 NOTE — NURSING NOTE
Doretha Fernandez is a 47 year old who is being evaluated via a billable video visit.      How would you like to obtain your AVS? MyChart  If the video visit is dropped, the invitation should be resent by: Text to cell phone: [phone]  Will anyone else be joining your video visit? No    During this virtual visit the patient is located in [place], patient verifies this as the location during the entirety of this visit.      Dai Moreno RN

## 2023-02-24 NOTE — LETTER
"2023       RE: Doretha Fernandez  [id number] [facility] [address]     Dear Colleague,    Thank you for referring your patient, Doretha Fernandez, to the [facility] COLON AND [facility]ERY [facility] [place] at [facility]. Please see a copy of my visit note below.    Colon and Rectal Surgery Video Clinic Note    RE: Doretha Fernandez.  : 1975.  JUNIOR: 2023.      Reason for visit: follow up    HPI:  Doretha Fernandez is a 46 year old female with ulcerative colitis. She presents today for a follow up to discuss proctectomy and J pouch.  On 6/15/2021 I took her to the OR for a TAC with end ileostomy (laparoscopic). She does currently have a parastomal hernia. Last time I saw Doretha I continued to encourage weight loss and titrating off steroids as I will not be able to create a pouch with Doretha on prednisone. Her upper GI on 10/4/2022 showed mild chronic inflammation in the gastric antrum and LA grade A esophagitis with no bleeding in the gastroesophageal junction. Her MRE in 2022 showed a subtotal colectomy with right paramedian mid abdominal ileostomy and long Mejía's pouch. No findings specific for acute inflammation of the bowel. Multiple prominent pericolonic/perirectal lymph nodes, similar to comparison. Fat-containing noninflamed parastomal hernia. Pancreatic atrophy.    She was admitted from 2023-2023 for acute on chronic abdominal pain with nausea and vomiting consistent with non-bleeding gastritis and esophagitis which was treated conservatively at the [facility]. It was noted that she has gallbladder dysfunction but surgery was not recommended at that time due to ileostomy location (this was at [facility]).  At the [place] she was not very satisfied with her care and felt like she left \"in the same pain\" as she presented.  She wanted an upper GI scope, but did not receive one.      She then went down to " "[place].  Upper GI scope was performed that showed some reflux esophagitis.  Biopsies of the esophagus were negative for eosinophilic esophagitis and it is believed the inflammation is due to GERD.  Her pantoprazole was changed to dexlansoprazole by Dr Sanford, her GI MD down in [place].  She also had a biopsy of the second portion of her duodenum which on pathology showed changes possibly consistent with celiac disease.  She has now been on a gluten free diet per Dr Sanford since last week.     Prednisone: off of it since last week. Weight loss: 28 pounds since starting her prior medical weight loss plan.  After much experimentation with weight loss medications she is on phentermine alone for the past week.  She has not had chronic nausea or vomiting for several weeks as it was believed the nausea/vomiting was due to the biologic weight loss medication.    She reports \"pain\" all over, but especially in her shoulders and arms.  She states she is having \"lots\" of ostomy output.  She is not measuring her ileostomy output.    She has a follow up visit scheduled with Dr Sanford at [place] for 3/14/2023.  She remains on cannabinoids via her vape pen.  She was told to remain on this from her physician team (GI?) at [facility] (her report, I did not read this in their notes).       Upper GI (10/4/2022):  Findings:        LA Grade A (one or more mucosal breaks less than 5 mm, not extending        between tops of 2 mucosal folds) esophagitis with no bleeding was found        at the gastroesophageal junction.        Localized mild inflammation was found in the gastric antrum. Biopsies        were taken with a cold forceps for histology. No evidence for recurrent        melanoma.        The examined duodenum was normal.        Surgical Pathology (10/4/2022):    FINAL DIAGNOSIS   A.  Stomach, antrum, body, endoscopic biopsy:  Fundic-type   mucosa with mild chronic inflammation and focal active   inflammation.  Antral mucosa with mild " reactive epithelial   change.  No intestinal metaplasia.  An immunohistochemical   stain for Helicobacter pylori is pending and will be   reported in an addendum       MRE (12/27/2022):  IMPRESSION:   1. Status post subtotal colectomy with right paramedian mid abdominal  ileostomy and long Mejía's pouch.  2. No findings specific for acute inflammation of the bowel.  3. Multiple prominent pericolonic/perirectal lymph nodes, similar to  comparison.  4. Fat-containing noninflamed parastomal hernia.   5. Pancreatic atrophy.    Medications:  Current Outpatient Medications   Medication Sig Dispense Refill     acetaminophen (TYLENOL) 325 MG tablet Take 3 tablets (975 mg) by mouth every 8 hours As scheduled for the next 7-10 days, then decrease both dose & frequency to as needed there after. 90 tablet 0     albuterol (PROAIR HFA/PROVENTIL HFA/VENTOLIN HFA) 108 (90 Base) MCG/ACT inhaler Inhale 2 puffs into the lungs every 4 hours as needed for shortness of breath / dyspnea 18 g 1     Calcium Carb-Cholecalciferol 600-800 MG-UNIT TABS Take 800 mg by mouth every morning (before breakfast) 90 tablet 3     calcium carbonate (TUMS) 500 MG chewable tablet Take 1 tablet (500 mg) by mouth 3 times daily as needed for heartburn 30 tablet 0     Cholecalciferol (VITAMIN D3) 25 MCG TABS TAKE THREE TABLETS BY MOUTH ONCE DAILY 300 tablet 1     cyanocobalamin (VITAMIN B-12) 1000 MCG tablet Take 1 tablet (1,000 mcg) by mouth daily       diclofenac (VOLTAREN) 1 % topical gel Apply 4 g topically 4 times daily 150 g 0     gabapentin (NEURONTIN) 600 MG tablet TAKE ONE TABLET (600MG) BY MOUTH FOUR TIMES A DAY (Patient taking differently: Take 600 mg by mouth daily) 120 tablet 5     loperamide (IMODIUM A-D) 2 MG tablet Take 2 tabs daily and increase as needed until output is apple sauce consistency. Max of 8 tabs (16 mg) per day. 240 tablet 3     medical cannabis (Patient's own supply) See Admin Instructions (The purpose of this order is to  document that the patient reports taking medical cannabis.  This is not a prescription, and is not used to certify that the patient has a qualifying medical condition.)       metFORMIN (GLUCOPHAGE) 500 MG tablet Take 2 tablets (1,000 mg) by mouth 2 times daily (with meals) ** VISIT DUE/LAST FILL ** 360 tablet 0     methocarbamol 1000 MG TABS Take 1,000 mg by mouth 3 times daily as needed for muscle spasms 21 tablet 0     ondansetron (ZOFRAN ODT) 4 MG ODT tab Take 1 tablet (4 mg) by mouth every 8 hours as needed for nausea or vomiting 10 tablet 1     Ostomy Supplies MISC 20 each daily 20 each 11     oxyCODONE-acetaminophen (PERCOCET) 5-325 MG tablet Take 1 tablet by mouth every 6 hours as needed for severe pain (7-10) 12 tablet 0     pantoprazole (PROTONIX) 40 MG EC tablet Take 1 tablet (40 mg) by mouth 2 times daily for 90 days 180 tablet 0     predniSONE (DELTASONE) 1 MG tablet Take 1 tablet (1 mg) by mouth daily Taper 1 mg every 2 weeks    Continue taper as previously instructed. Current dose 1 mg daily 10 tablet 0     prochlorperazine (COMPAZINE) 10 MG tablet Take 1 tablet (10 mg) by mouth every 6 hours as needed for vomiting 10 tablet 0     QUEtiapine (SEROQUEL) 25 MG tablet Take 1 tablet (25 mg) by mouth 2 times daily as needed (Anxiety) 10 tablet 0     simethicone (MYLICON) 80 MG chewable tablet Take 1-2 tablets ( mg) by mouth 4 times daily as needed for cramping 120 tablet 0     sucralfate (CARAFATE) 1 GM tablet Take 1 tablet (1 g) by mouth 4 times daily as needed for nausea (esophagitis pain) 21 tablet 1     venlafaxine (EFFEXOR) 75 MG tablet TAKE 2 TABLETS (150 MG) BY MOUTH 2 TIMES DAILY 120 tablet 3     VITRON-C  MG TABS tablet Take 1 tablet by mouth daily 30 tablet 1       Allergies:  Allergies   Allergen Reactions     Bee Venom Swelling     Azithromycin Diarrhea     Erythromycin      Other reaction(s): GI intolerance, Vomiting     Fentanyl Other (See Comments)     sweating  sweating      Prochlorperazine Fatigue     Other reaction(s): Other (see comments)  Fatigue     Buspirone      Other reaction(s): GI intolerance  vomiting     Erythrocin Nausea and Vomiting     Topamax [Topiramate]      Made her lethargic     Zithromax [Azithromycin Dihydrate] Diarrhea     Enbrel [Etanercept] Hives and Rash       Social history:   Social History     Tobacco Use     Smoking status: Former     Packs/day: 1.00     Years: 5.00     Pack years: 5.00     Types: Cigarettes     Quit date: 3/20/1998     Years since quittin.9     Smokeless tobacco: Never   Substance Use Topics     Alcohol use: Not Currently     Marital status: .    ROS:  A complete review of systems was performed with the patient and all systems negative except as per HPI.    Physical Examination:  Virtual visit.  She looks well.  She is in better spirits (not crying, actually laughing and somewhat jovial) than last visit.    Procedures:  None.    ASSESSMENT  48 y/o lady s/p TAC for UC over a year ago, still dealing with some issues prior to being ideal candidate for J pouch, including    1. Celiac disease (possible)  2. Chronic nausea (improving)  3. Non-ideal body weight (started on phentermine last week)    Risks, benefits, and alternatives of operative treatment were thoroughly discussed with the patient, he/she understands these well and agrees to proceed.    PLAN  1. Ileostomy output - Recommended measuring ostomy output and if >1000 ml over 24 hour period she should start imodium 1 tab bid, increase to 2 tabs bid if not helpful, and if that is not helpful she should call us and we will advise on increasing her dose  2. See me again in April after her March appointment with her GI team at [facility] - she is now gluten free as she was recently diagnosed with celiac disease and her PPI was changed to dexlansoprazole (dexilant?) as she was found to have reflux esophagitis on her 2023 EGD at [facility]  3. Continue phentermine for weight loss (  [facility] at [facility]); she has had sleeve gastrectomy mentioned to her but has not had a serious conversation about this; she has only been on phetermine for a week (was previously on some biologic agent that gave her esophagitis and chronic nausea/vomiting) and so I would like to see if this is successful for her; if it is not I wonder if she would be a candidate for sleeve gastrectomy; her current BMI makes J pouch formation (and reach) very risky  4. MRE from 12/2022 reviewed - no evidence of small bowel Crohn's disease  5. I will see her again in April 2023; video visit OK        Quinton Ram MD, PhD    Division of Colon and Rectal Surgery  [facility]    Referring Provider:  Miguel Lyon DO  [address] [place] [address]     Primary Care Provider:  Anna Naidu

## 2023-03-06 ENCOUNTER — OFFICE VISIT (OUTPATIENT)
Dept: DERMATOLOGY | Facility: CLINIC | Age: 48
End: 2023-03-06
Payer: COMMERCIAL

## 2023-03-06 DIAGNOSIS — Z85.820 HISTORY OF MELANOMA: Primary | ICD-10-CM

## 2023-03-06 DIAGNOSIS — D18.01 ANGIOMA OF SKIN: ICD-10-CM

## 2023-03-06 DIAGNOSIS — L81.4 LENTIGO: ICD-10-CM

## 2023-03-06 DIAGNOSIS — Z85.828 HISTORY OF SKIN CANCER: ICD-10-CM

## 2023-03-06 DIAGNOSIS — D23.9 DERMAL NEVUS: ICD-10-CM

## 2023-03-06 DIAGNOSIS — L82.1 SEBORRHEIC KERATOSIS: ICD-10-CM

## 2023-03-06 PROCEDURE — 99213 OFFICE O/P EST LOW 20 MIN: CPT | Performed by: DERMATOLOGY

## 2023-03-06 ASSESSMENT — PAIN SCALES - GENERAL: PAINLEVEL: NO PAIN (0)

## 2023-03-06 NOTE — LETTER
3/6/2023         RE: Doretha Fernandez  [address]  [facility] [address]        Dear Colleague,    Thank you for referring your patient, Doretha Fernandez, to the [facility]. Please see a copy of my visit note below.    Doretha Fernandez is an extremely pleasant 48 year old year old female patient here today for f/u h xof melanoma unknown primary.  She went to Dr. Cool for node dissection.1 out of 20 nodes positive.  No adjuvant Radiation.  of brain and PET scan both clear.  Was on  Nivolumab got 8 injection but got colitis.  She went to [place].   She was treated with high dose steroids for colitis and had a dx of bacterial pneumonia.   She was admitted for to hospital for this. She is followed by GI She is off of melanoma meds.  She was also dx with seronegative rheumatoid arthritis and was on humira. Scans havebeen stable.  She is off of all medications now.  Waiting to get colostomy back out.  Associated symptoms: none.  Patient has no other skin complaints today.  Remainder of the HPI, Meds, PMH, Allergies, FH, and SH was reviewed in chart.       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      Anxiety      Basal cell carcinoma      Cervical high risk HPV (human papillomavirus) test positive 2019    See problem list     Colitis      Depression      Diabetes mellitus, iatrogenic (H) 2020     Esophageal reflux      Inflammatory arthritis      Insomnia      Intestinal giardiasis 2018     Lumbago     left lower back pain     Lymphedema      Malignant melanoma (H)      Melanoma (H) 10/23/2017     Migraines      Mild persistent asthma      Morbid obesity with BMI of 40.0-44.9, adult (H)      STORMY (obstructive sleep apnea)      Prothrombin deficiency (H)     takes 81mg asa daily     Stroke (cerebrum) (H)     During      TIA (transient ischemic attack)      Type 2 diabetes mellitus (H)         Past Surgical History:   Procedure Laterality Date     APPENDECTOMY       COLONOSCOPY N/A 10/18/2017    Procedure: COLONOSCOPY;  Colon;  Surgeon: Debbie Stephens MD;  [facility]     COLONOSCOPY N/A 2018    Procedure: COMBINED COLONOSCOPY, SINGLE OR MULTIPLE BIOPSY/POLYPECTOMY BY BIOPSY;  colon;  Surgeon: Benita Schumacher MD;  Location: [facility] GI     COLONOSCOPY      multiple since  to present - about 6 total     DISSECT LYMPH NODE AXILLA Left 10/23/2017    Procedure: DISSECT LYMPH NODE AXILLA;  Left Axillary Lymph Node Dissection ;  Surgeon: Laurent Cool MD;  Location: [facility] OR     EXAM UNDER ANESTHESIA PELVIC N/A 2020    Procedure: EXAM UNDER ANESTHESIA, PELVIS; with Cervical Biopsies, Vaginal Biopsy and Endocervical Curettings;  Surgeon: Melina Jung MD;  Location: [facility] OR     GYN SURGERY  ,          LAPAROSCOPIC ASSISTED COLECTOMY N/A 06/15/2021    Procedure: laparoscopic total abdominal colectomy, end ileostomy;  Surgeon: Quinton Ram MD;  Location: [facility] OR     REPAIR MOHS Left 2017    Procedure: REPAIR MOHS;  Left Upper Lid Moh's Reconstruction;  Surgeon: Kisha Bosch MD;  [facility] OR        Family History   Problem Relation Age of Onset     Cancer Mother 45        lung     Neurologic Disorder Mother         epilepsy     Lipids Father      Gastrointestinal Disease Father         diverticulitis      Depression Father      Colitis Father      Colon Cancer Father      Diverticulitis Father      Cancer Maternal Grandmother      Blood Disease Maternal Grandmother         lymphoma      Arthritis Maternal Grandmother      Diabetes Maternal Grandmother      Depression Maternal Grandmother      Macular Degeneration Maternal Grandmother      Glaucoma Maternal Grandmother      Diabetes Maternal Grandfather      Cerebrovascular Disease Maternal Grandfather      Blood Disease Maternal Grandfather      Heart Disease Maternal Grandfather      Glaucoma  "Maternal Grandfather      Cancer Paternal Grandmother      Cancer - colorectal Paternal Grandmother      Colitis Paternal Grandmother      Colon Cancer Paternal Grandmother      Diverticulitis Paternal Grandmother      Respiratory Paternal Grandfather         emphysema      Colitis Paternal Grandfather      Colon Cancer Paternal Grandfather      Diverticulitis Paternal Grandfather      Heart Disease Daughter      Asthma Daughter      Depression Sister      Melanoma No family hx of        Social History     Socioeconomic History     Marital status:      Spouse name: Not on file     Number of children: Not on file     Years of education: Not on file     Highest education level: Not on file   Occupational History     Not on file   Tobacco Use     Smoking status: Former     Packs/day: 1.00     Years: 5.00     Pack years: 5.00     Types: Cigarettes     Quit date: 3/20/1998     Years since quittin.9     Smokeless tobacco: Never   Vaping Use     Vaping Use: Never used   Substance and Sexual Activity     Alcohol use: Not Currently     Drug use: Yes     Types: Marijuana     Sexual activity: Not Currently     Partners: Male     Birth control/protection: Surgical   Other Topics Concern     Parent/sibling w/ CABG, MI or angioplasty before 65F 55M? No   Social History Narrative    19 y.o- patient's mother   of lung cancer. She had to take care of her younger sister.    2012- patient's  had a heart attack with stents placed, followed by cardiac rehabilitation    2000 TO 2012  was in [facility] for depression    2013 patient's  went through alcohol rehabilitation at [facility] inpatient            They have attended couple counseling a couple of times and patient went to the family program for chemical dependency.    Patient denies alcohol or drug use and herself            Has 2 children, girls ages 17 and 14. Oldest has been a \"trouble " "maker.\"     For a while she was working 3 jobs since her  was ill. Works at the [employer] for [employer]. Reports her job is very stressful.         Social Determinants of Health     Financial Resource Strain: High Risk     Difficulty of Paying Living Expenses: Very hard   Food Insecurity: No Food Insecurity     Worried About Running Out of Food in the Last Year: Never true     Ran Out of Food in the Last Year: Never true   Transportation Needs: No Transportation Needs     Lack of Transportation (Medical): No     Lack of Transportation (Non-Medical): No   Physical Activity: Not on file   Stress: Not on file   Social Connections: Not on file   Intimate Partner Violence: Unknown     Fear of Current or Ex-Partner: Not asked     Emotionally Abused: Not asked     Physically Abused: Not asked     Sexually Abused: Not asked   Housing Stability: Not on file       Outpatient Encounter Medications as of 3/6/2023   Medication Sig Dispense Refill     acetaminophen (TYLENOL) 325 MG tablet Take 3 tablets (975 mg) by mouth every 8 hours As scheduled for the next 7-10 days, then decrease both dose & frequency to as needed there after. 90 tablet 0     albuterol (PROAIR HFA/PROVENTIL HFA/VENTOLIN HFA) 108 (90 Base) MCG/ACT inhaler Inhale 2 puffs into the lungs every 4 hours as needed for shortness of breath / dyspnea 18 g 1     Calcium Carb-Cholecalciferol 600-800 MG-UNIT TABS Take 800 mg by mouth every morning (before breakfast) 90 tablet 3     calcium carbonate (TUMS) 500 MG chewable tablet Take 1 tablet (500 mg) by mouth 3 times daily as needed for heartburn 30 tablet 0     Cholecalciferol (VITAMIN D3) 25 MCG TABS TAKE THREE TABLETS BY MOUTH ONCE DAILY 300 tablet 1     cyanocobalamin (VITAMIN B-12) 1000 MCG tablet Take 1 tablet (1,000 mcg) by mouth daily       diclofenac (VOLTAREN) 1 % topical gel Apply 4 g topically 4 times daily 150 g 0     gabapentin (NEURONTIN) 600 MG tablet TAKE ONE TABLET (600MG) BY " MOUTH FOUR TIMES A DAY (Patient taking differently: Take 600 mg by mouth daily) 120 tablet 5     loperamide (IMODIUM A-D) 2 MG tablet Take 2 tabs daily and increase as needed until output is apple sauce consistency. Max of 8 tabs (16 mg) per day. 240 tablet 3     medical cannabis (Patient's own supply) See Admin Instructions (The purpose of this order is to document that the patient reports taking medical cannabis.  This is not a prescription, and is not used to certify that the patient has a qualifying medical condition.)       metFORMIN (GLUCOPHAGE) 500 MG tablet TAKE 2 TABLETS BY MOUTH 2 TIMES DAILY WITH MEALS ** OFFICE VISIT DUE/LAST FILL ** 120 tablet 0     methocarbamol 1000 MG TABS Take 1,000 mg by mouth 3 times daily as needed for muscle spasms 21 tablet 0     ondansetron (ZOFRAN ODT) 4 MG ODT tab Take 1 tablet (4 mg) by mouth every 8 hours as needed for nausea or vomiting 10 tablet 1     Ostomy Supplies MISC 20 each daily 20 each 11     oxyCODONE-acetaminophen (PERCOCET) 5-325 MG tablet Take 1 tablet by mouth every 6 hours as needed for severe pain (7-10) 12 tablet 0     pantoprazole (PROTONIX) 40 MG EC tablet Take 1 tablet (40 mg) by mouth 2 times daily for 90 days 180 tablet 0     predniSONE (DELTASONE) 1 MG tablet Take 1 tablet (1 mg) by mouth daily Taper 1 mg every 2 weeks    Continue taper as previously instructed. Current dose 1 mg daily (Patient not taking: Reported on 2/24/2023) 10 tablet 0     prochlorperazine (COMPAZINE) 10 MG tablet Take 1 tablet (10 mg) by mouth every 6 hours as needed for vomiting 10 tablet 0     QUEtiapine (SEROQUEL) 25 MG tablet Take 1 tablet (25 mg) by mouth 2 times daily as needed (Anxiety) 10 tablet 0     simethicone (MYLICON) 80 MG chewable tablet Take 1-2 tablets ( mg) by mouth 4 times daily as needed for cramping 120 tablet 0     sucralfate (CARAFATE) 1 GM tablet Take 1 tablet (1 g) by mouth 4 times daily as needed for nausea (esophagitis pain) 21 tablet 1      venlafaxine (EFFEXOR) 75 MG tablet TAKE 2 TABLETS (150 MG) BY MOUTH 2 TIMES DAILY 120 tablet 3     VITRON-C  MG TABS tablet Take 1 tablet by mouth daily 30 tablet 1     Facility-Administered Encounter Medications as of 3/6/2023   Medication Dose Route Frequency Provider Last Rate Last Admin     lidocaine 1 % 9 mL  9 mL Intradermal Once Anna Naidu MD         sodium bicarbonate 8.4 % injection 1 mEq  1 mEq Intradermal Once Anna Naidu MD                 O:   NAD, WDWN, Alert & Oriented, Mood & Affect wnl, Vitals stable   Here today alone   LMP 01/03/2023 (Approximate)    General appearance normal   Vitals stable   Alert, oriented and in no acute distress      Following lymph nodes palpated: Occipital, Cervical, Supraclavicular , inguinal, axillary no lad        Stuck on papules and brown macules on trunk and ext   Red papules on trunk  Flesh colored papules on trunk     The remainder of the full exam was normal; the following areas were examined:  conjunctiva/lids, oral mucosa, neck, peripheral vascular system, abdomen, lymph nodes, digits/nails, eccrine and apocrine glands, scalp/hair, face, neck, chest, abdomen, buttocks, back, RUE, LUE, RLE, LLE       Eyes: Conjunctivae/lids:Normal     ENT: Lips, buccal mucosa, tongue: normal    MSK:Normal    Cardiovascular: peripheral edema none    Pulm: Breathing Normal    Lymph Nodes: No Head and Neck Lymphadenopathy     Neuro/Psych: Orientation:Alert and Orientedx3 ; Mood/Affect:normal       A/P:  1. Seborrheic keratosis, lentigo, angioma, dermal nevus, hx of non-melanoma skin cancer, hx of metastic melanoma  MELANOMA DISCUSSED WITH PATIENT:  I discussed the specifics of tumor, prognosis, metachronous melanoma, self exam, and genetics with the patient. I explained the need for monthly skin exams including and taught the patient how to do this. Patient was asked about new or changing moles . I discussed with patient signs and symptoms that could arise in the  setting of recurrent locoregional or metastatic disease. In addition, the need to undergo every 6 month dermatologic full skin survey and evaluation given that patients with a diagnosis of melanoma are at risk of recurrence (local and distant) and of subsequent de bhumi melanoma.      It was a pleasure speaking to Doretha Fernandez today.  Previous clinic notes and pertinent laboratory tests were reviewed prior to Doretha Fernandez's visit.  Signs and Symptoms of skin cancer discussed with patient.  Patient encouraged to perform monthly skin exams.  UV precautions reviewed with patient.  Return to clinic 6 months        Again, thank you for allowing me to participate in the care of your patient.        Sincerely,        Lowell Bullock MD

## 2023-03-06 NOTE — PROGRESS NOTES
Doretha Fernandez is an extremely pleasant 48 year old year old female patient here today for f/u h xof melanoma unknown primary.  She went to Dr. Cool for node dissection.1 out of 20 nodes positive.  No adjuvant Radiation.  of brain and PET scan both clear.  Was on  Nivolumab got 8 injection but got colitis.  She went to [facility].   She was treated with high dose steroids for colitis and had a dx of bacterial pneumonia.   She was admitted for to hospital for this. She is followed by GI She is off of melanoma meds.  She was also dx with seronegative rheumatoid arthritis and was on humira. Scans havebeen stable.  She is off of all medications now.  Waiting to get colostomy back out.  Associated symptoms: none.  Patient has no other skin complaints today.  Remainder of the HPI, Meds, PMH, Allergies, FH, and SH was reviewed in chart.       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      Anxiety      Basal cell carcinoma      Cervical high risk HPV (human papillomavirus) test positive 2019    See problem list     Colitis      Depression      Diabetes mellitus, iatrogenic (H) 2020     Esophageal reflux      Inflammatory arthritis      Insomnia      Intestinal giardiasis 2018     Lumbago     left lower back pain     Lymphedema      Malignant melanoma (H)      Melanoma (H) 10/23/2017     Migraines      Mild persistent asthma      Morbid obesity with BMI of 40.0-44.9, adult (H)      STORMY (obstructive sleep apnea)      Prothrombin deficiency (H)     takes 81mg asa daily     Stroke (cerebrum) (H)     During      TIA (transient ischemic attack)      Type 2 diabetes mellitus (H)        Past Surgical History:   Procedure Laterality Date     APPENDECTOMY       COLONOSCOPY N/A 10/18/2017    Procedure: COLONOSCOPY;  Colon;  Surgeon: Debbie Stephens MD;  Location: [facility]     COLONOSCOPY N/A 2018    Procedure: COMBINED  COLONOSCOPY, SINGLE OR MULTIPLE BIOPSY/POLYPECTOMY BY BIOPSY;  colon;  Surgeon: Benita Schumacher MD;  Location: [facility]     COLONOSCOPY      multiple since 2018 to present - about 6 total     DISSECT LYMPH NODE AXILLA Left 10/23/2017    Procedure: DISSECT LYMPH NODE AXILLA;  Left Axillary Lymph Node Dissection ;  Surgeon: Laurent Cool MD;  Location: [facility]     EXAM UNDER ANESTHESIA PELVIC N/A 2020    Procedure: EXAM UNDER ANESTHESIA, PELVIS; with Cervical Biopsies, Vaginal Biopsy and Endocervical Curettings;  Surgeon: Melina Jung MD;  Location: [facility]     GYN SURGERY  ,          LAPAROSCOPIC ASSISTED COLECTOMY N/A 06/15/2021    Procedure: laparoscopic total abdominal colectomy, end ileostomy;  Surgeon: Quinton Ram MD;  Location: [facility]     REPAIR MOHS Left 2017    Procedure: REPAIR MOHS;  Left Upper Lid Moh's Reconstruction;  Surgeon: Kisha Bosch MD;  Location: [facility]        Family History   Problem Relation Age of Onset     Cancer Mother 45        lung     Neurologic Disorder Mother         epilepsy     Lipids Father      Gastrointestinal Disease Father         diverticulitis      Depression Father      Colitis Father      Colon Cancer Father      Diverticulitis Father      Cancer Maternal Grandmother      Blood Disease Maternal Grandmother         lymphoma      Arthritis Maternal Grandmother      Diabetes Maternal Grandmother      Depression Maternal Grandmother      Macular Degeneration Maternal Grandmother      Glaucoma Maternal Grandmother      Diabetes Maternal Grandfather      Cerebrovascular Disease Maternal Grandfather      Blood Disease Maternal Grandfather      Heart Disease Maternal Grandfather      Glaucoma Maternal Grandfather      Cancer Paternal Grandmother      Cancer - colorectal Paternal Grandmother      Colitis Paternal Grandmother      Colon Cancer Paternal Grandmother      Diverticulitis Paternal Grandmother      Respiratory Paternal  "Grandfather         emphysema      Colitis Paternal Grandfather      Colon Cancer Paternal Grandfather      Diverticulitis Paternal Grandfather      Heart Disease Daughter      Asthma Daughter      Depression Sister      Melanoma No family hx of        Social History     Socioeconomic History     Marital status:      Spouse name: Not on file     Number of children: Not on file     Years of education: Not on file     Highest education level: Not on file   Occupational History     Not on file   Tobacco Use     Smoking status: Former     Packs/day: 1.00     Years: 5.00     Pack years: 5.00     Types: Cigarettes     Quit date: 3/20/1998     Years since quittin.9     Smokeless tobacco: Never   Vaping Use     Vaping Use: Never used   Substance and Sexual Activity     Alcohol use: Not Currently     Drug use: Yes     Types: Marijuana     Sexual activity: Not Currently     Partners: Male     Birth control/protection: Surgical   Other Topics Concern     Parent/sibling w/ CABG, MI or angioplasty before 65F 55M? No   Social History Narrative    20 y.o- patient's mother   of lung cancer. She had to take care of her younger sister.    2012- patient's  had a heart attack with stents placed, followed by cardiac rehabilitation    2000 TO 2012  was in [facility] for depression    2013 patient's  went through alcohol rehabilitation at [facility]            They have attended couple counseling a couple of times and patient went to the family program for chemical dependency.    Patient denies alcohol or drug use and herself            Has 2 children, girls ages 17 and 14. Oldest has been a \"trouble maker.\"     For a while she was working 3 jobs since her  was ill. Works at the [employer] for [employer]. Reports her job is very stressful.         Social Determinants of Health     Financial Resource Strain: High Risk "     Difficulty of Paying Living Expenses: Very hard   Food Insecurity: No Food Insecurity     Worried About Running Out of Food in the Last Year: Never true     Ran Out of Food in the Last Year: Never true   Transportation Needs: No Transportation Needs     Lack of Transportation (Medical): No     Lack of Transportation (Non-Medical): No   Physical Activity: Not on file   Stress: Not on file   Social Connections: Not on file   Intimate Partner Violence: Unknown     Fear of Current or Ex-Partner: Not asked     Emotionally Abused: Not asked     Physically Abused: Not asked     Sexually Abused: Not asked   Housing Stability: Not on file       Outpatient Encounter Medications as of 3/6/2023   Medication Sig Dispense Refill     acetaminophen (TYLENOL) 325 MG tablet Take 3 tablets (975 mg) by mouth every 8 hours As scheduled for the next 7-10 days, then decrease both dose & frequency to as needed there after. 90 tablet 0     albuterol (PROAIR HFA/PROVENTIL HFA/VENTOLIN HFA) 108 (90 Base) MCG/ACT inhaler Inhale 2 puffs into the lungs every 4 hours as needed for shortness of breath / dyspnea 18 g 1     Calcium Carb-Cholecalciferol 600-800 MG-UNIT TABS Take 800 mg by mouth every morning (before breakfast) 90 tablet 3     calcium carbonate (TUMS) 500 MG chewable tablet Take 1 tablet (500 mg) by mouth 3 times daily as needed for heartburn 30 tablet 0     Cholecalciferol (VITAMIN D3) 25 MCG TABS TAKE THREE TABLETS BY MOUTH ONCE DAILY 300 tablet 1     cyanocobalamin (VITAMIN B-12) 1000 MCG tablet Take 1 tablet (1,000 mcg) by mouth daily       diclofenac (VOLTAREN) 1 % topical gel Apply 4 g topically 4 times daily 150 g 0     gabapentin (NEURONTIN) 600 MG tablet TAKE ONE TABLET (600MG) BY MOUTH FOUR TIMES A DAY (Patient taking differently: Take 600 mg by mouth daily) 120 tablet 5     loperamide (IMODIUM A-D) 2 MG tablet Take 2 tabs daily and increase as needed until output is apple sauce consistency. Max of 8 tabs (16 mg) per  day. 240 tablet 3     medical cannabis (Patient's own supply) See Admin Instructions (The purpose of this order is to document that the patient reports taking medical cannabis.  This is not a prescription, and is not used to certify that the patient has a qualifying medical condition.)       metFORMIN (GLUCOPHAGE) 500 MG tablet TAKE 2 TABLETS BY MOUTH 2 TIMES DAILY WITH MEALS ** OFFICE VISIT DUE/LAST FILL ** 120 tablet 0     methocarbamol 1000 MG TABS Take 1,000 mg by mouth 3 times daily as needed for muscle spasms 21 tablet 0     ondansetron (ZOFRAN ODT) 4 MG ODT tab Take 1 tablet (4 mg) by mouth every 8 hours as needed for nausea or vomiting 10 tablet 1     Ostomy Supplies MISC 20 each daily 20 each 11     oxyCODONE-acetaminophen (PERCOCET) 5-325 MG tablet Take 1 tablet by mouth every 6 hours as needed for severe pain (7-10) 12 tablet 0     pantoprazole (PROTONIX) 40 MG EC tablet Take 1 tablet (40 mg) by mouth 2 times daily for 90 days 180 tablet 0     predniSONE (DELTASONE) 1 MG tablet Take 1 tablet (1 mg) by mouth daily Taper 1 mg every 2 weeks    Continue taper as previously instructed. Current dose 1 mg daily (Patient not taking: Reported on 2/24/2023) 10 tablet 0     prochlorperazine (COMPAZINE) 10 MG tablet Take 1 tablet (10 mg) by mouth every 6 hours as needed for vomiting 10 tablet 0     QUEtiapine (SEROQUEL) 25 MG tablet Take 1 tablet (25 mg) by mouth 2 times daily as needed (Anxiety) 10 tablet 0     simethicone (MYLICON) 80 MG chewable tablet Take 1-2 tablets ( mg) by mouth 4 times daily as needed for cramping 120 tablet 0     sucralfate (CARAFATE) 1 GM tablet Take 1 tablet (1 g) by mouth 4 times daily as needed for nausea (esophagitis pain) 21 tablet 1     venlafaxine (EFFEXOR) 75 MG tablet TAKE 2 TABLETS (150 MG) BY MOUTH 2 TIMES DAILY 120 tablet 3     VITRON-C  MG TABS tablet Take 1 tablet by mouth daily 30 tablet 1     Facility-Administered Encounter Medications as of 3/6/2023    Medication Dose Route Frequency Provider Last Rate Last Admin     lidocaine 1 % 9 mL  9 mL Intradermal Once Anna Naidu MD         sodium bicarbonate 8.4 % injection 1 mEq  1 mEq Intradermal Once Anna Naidu MD                 O:   NAD, WDWN, Alert & Oriented, Mood & Affect wnl, Vitals stable   Here today alone   [facility] 01/03/2023 (Approximate)    General appearance normal   Vitals stable   Alert, oriented and in no acute distress      Following lymph nodes palpated: Occipital, Cervical, Supraclavicular , inguinal, axillary no lad        Stuck on papules and brown macules on trunk and ext   Red papules on trunk  Flesh colored papules on trunk     The remainder of the full exam was normal; the following areas were examined:  conjunctiva/lids, oral mucosa, neck, peripheral vascular system, abdomen, lymph nodes, digits/nails, eccrine and apocrine glands, scalp/hair, face, neck, chest, abdomen, buttocks, back, RUE, LUE, RLE, LLE       Eyes: Conjunctivae/lids:Normal     ENT: Lips, buccal mucosa, tongue: normal    MSK:Normal    Cardiovascular: peripheral edema none    Pulm: Breathing Normal    Lymph Nodes: No Head and Neck Lymphadenopathy     Neuro/Psych: Orientation:Alert and Orientedx3 ; Mood/Affect:normal       A/P:  1. Seborrheic keratosis, lentigo, angioma, dermal nevus, hx of non-melanoma skin cancer, hx of metastic melanoma  MELANOMA DISCUSSED WITH PATIENT:  I discussed the specifics of tumor, prognosis, metachronous melanoma, self exam, and genetics with the patient. I explained the need for monthly skin exams including and taught the patient how to do this. Patient was asked about new or changing moles . I discussed with patient signs and symptoms that could arise in the setting of recurrent locoregional or metastatic disease. In addition, the need to undergo every 6 month dermatologic full skin survey and evaluation given that patients with a diagnosis of melanoma are at risk of recurrence (local and  distant) and of subsequent de bhumi melanoma.      It was a pleasure speaking to Doretha Fernandez today.  Previous clinic notes and pertinent laboratory tests were reviewed prior to Doretha Fernandez's visit.  Signs and Symptoms of skin cancer discussed with patient.  Patient encouraged to perform monthly skin exams.  UV precautions reviewed with patient.  Return to clinic 6 months

## 2023-03-14 ENCOUNTER — TELEPHONE (OUTPATIENT)
Dept: FAMILY MEDICINE | Facility: CLINIC | Age: 48
End: 2023-03-14

## 2023-03-29 ENCOUNTER — APPOINTMENT (OUTPATIENT)
Dept: CT IMAGING | Facility: CLINIC | Age: 48
End: 2023-03-29
Attending: EMERGENCY MEDICINE
Payer: COMMERCIAL

## 2023-03-29 ENCOUNTER — HOSPITAL ENCOUNTER (EMERGENCY)
Facility: CLINIC | Age: 48
Discharge: HOME | End: 2023-03-29
Attending: EMERGENCY MEDICINE | Admitting: EMERGENCY MEDICINE
Payer: COMMERCIAL

## 2023-03-29 ENCOUNTER — NURSE TRIAGE (OUTPATIENT)
Dept: NURSING | Facility: CLINIC | Age: 48
End: 2023-03-29
Payer: COMMERCIAL

## 2023-03-29 VITALS
OXYGEN SATURATION: 99 % | WEIGHT: 220 LBS | TEMPERATURE: 98.9 F | BODY MASS INDEX: 38.97 KG/M2 | HEART RATE: 75 BPM | RESPIRATION RATE: 14 BRPM | DIASTOLIC BLOOD PRESSURE: 80 MMHG | SYSTOLIC BLOOD PRESSURE: 126 MMHG

## 2023-03-29 DIAGNOSIS — R10.84 ABDOMINAL PAIN, GENERALIZED: ICD-10-CM

## 2023-03-29 LAB
ALBUMIN SERPL BCG-MCNC: 4.8 G/DL (ref 3.5–5.2)
ALBUMIN UR-MCNC: NEGATIVE MG/DL
ALP SERPL-CCNC: 108 U/L (ref 35–104)
ALT SERPL W P-5'-P-CCNC: 30 U/L (ref 10–35)
ANION GAP SERPL CALCULATED.3IONS-SCNC: 17 MMOL/L (ref 7–15)
APPEARANCE UR: ABNORMAL
AST SERPL W P-5'-P-CCNC: 26 U/L (ref 10–35)
BASOPHILS # BLD AUTO: 0.1 10E3/UL (ref 0–0.2)
BASOPHILS NFR BLD AUTO: 1 %
BILIRUB SERPL-MCNC: 0.2 MG/DL
BILIRUB UR QL STRIP: NEGATIVE
BUN SERPL-MCNC: 12.1 MG/DL (ref 6–20)
CALCIUM SERPL-MCNC: 10.9 MG/DL (ref 8.6–10)
CHLORIDE SERPL-SCNC: 95 MMOL/L (ref 98–107)
COLOR UR AUTO: YELLOW
CREAT SERPL-MCNC: 0.77 MG/DL (ref 0.51–0.95)
DEPRECATED HCO3 PLAS-SCNC: 23 MMOL/L (ref 22–29)
EOSINOPHIL # BLD AUTO: 0.3 10E3/UL (ref 0–0.7)
EOSINOPHIL NFR BLD AUTO: 3 %
ERYTHROCYTE [DISTWIDTH] IN BLOOD BY AUTOMATED COUNT: 14.4 % (ref 10–15)
GFR SERPL CREATININE-BSD FRML MDRD: >90 ML/MIN/1.73M2
GLUCOSE SERPL-MCNC: 118 MG/DL (ref 70–99)
GLUCOSE UR STRIP-MCNC: NEGATIVE MG/DL
HCT VFR BLD AUTO: 40.8 % (ref 35–47)
HGB BLD-MCNC: 13.3 G/DL (ref 11.7–15.7)
HGB UR QL STRIP: NEGATIVE
HOLD SPECIMEN: NORMAL
HOLD SPECIMEN: NORMAL
HYALINE CASTS: 10 /LPF
IMM GRANULOCYTES # BLD: 0.1 10E3/UL
IMM GRANULOCYTES NFR BLD: 0 %
KETONES UR STRIP-MCNC: 5 MG/DL
LEUKOCYTE ESTERASE UR QL STRIP: ABNORMAL
LIPASE SERPL-CCNC: 82 U/L (ref 13–60)
LYMPHOCYTES # BLD AUTO: 3.1 10E3/UL (ref 0.8–5.3)
LYMPHOCYTES NFR BLD AUTO: 27 %
MCH RBC QN AUTO: 27.7 PG (ref 26.5–33)
MCHC RBC AUTO-ENTMCNC: 32.6 G/DL (ref 31.5–36.5)
MCV RBC AUTO: 85 FL (ref 78–100)
MONOCYTES # BLD AUTO: 0.6 10E3/UL (ref 0–1.3)
MONOCYTES NFR BLD AUTO: 5 %
MUCOUS THREADS #/AREA URNS LPF: PRESENT /LPF
NEUTROPHILS # BLD AUTO: 7.6 10E3/UL (ref 1.6–8.3)
NEUTROPHILS NFR BLD AUTO: 64 %
NITRATE UR QL: NEGATIVE
NRBC # BLD AUTO: 0 10E3/UL
NRBC BLD AUTO-RTO: 0 /100
PH UR STRIP: 5 [PH] (ref 5–7)
PLATELET # BLD AUTO: 400 10E3/UL (ref 150–450)
POTASSIUM SERPL-SCNC: 3.9 MMOL/L (ref 3.4–5.3)
PROT SERPL-MCNC: 8.6 G/DL (ref 6.4–8.3)
RBC # BLD AUTO: 4.8 10E6/UL (ref 3.8–5.2)
RBC URINE: 0 /HPF
SODIUM SERPL-SCNC: 135 MMOL/L (ref 136–145)
SP GR UR STRIP: 1.02 (ref 1–1.03)
SQUAMOUS EPITHELIAL: 2 /HPF
UROBILINOGEN UR STRIP-MCNC: NORMAL MG/DL
WBC # BLD AUTO: 11.8 10E3/UL (ref 4–11)
WBC URINE: 6 /HPF

## 2023-03-29 PROCEDURE — 80053 COMPREHEN METABOLIC PANEL: CPT | Performed by: EMERGENCY MEDICINE

## 2023-03-29 PROCEDURE — 85025 COMPLETE CBC W/AUTO DIFF WBC: CPT | Performed by: EMERGENCY MEDICINE

## 2023-03-29 PROCEDURE — 74177 CT ABD & PELVIS W/CONTRAST: CPT

## 2023-03-29 PROCEDURE — 96361 HYDRATE IV INFUSION ADD-ON: CPT | Performed by: EMERGENCY MEDICINE

## 2023-03-29 PROCEDURE — 250N000011 HC RX IP 250 OP 636: Performed by: EMERGENCY MEDICINE

## 2023-03-29 PROCEDURE — 99284 EMERGENCY DEPT VISIT MOD MDM: CPT | Performed by: EMERGENCY MEDICINE

## 2023-03-29 PROCEDURE — 83690 ASSAY OF LIPASE: CPT | Performed by: EMERGENCY MEDICINE

## 2023-03-29 PROCEDURE — 36415 COLL VENOUS BLD VENIPUNCTURE: CPT | Performed by: EMERGENCY MEDICINE

## 2023-03-29 PROCEDURE — 87506 IADNA-DNA/RNA PROBE TQ 6-11: CPT | Performed by: EMERGENCY MEDICINE

## 2023-03-29 PROCEDURE — 258N000003 HC RX IP 258 OP 636: Performed by: EMERGENCY MEDICINE

## 2023-03-29 PROCEDURE — 96374 THER/PROPH/DIAG INJ IV PUSH: CPT | Mod: 59 | Performed by: EMERGENCY MEDICINE

## 2023-03-29 PROCEDURE — 87086 URINE CULTURE/COLONY COUNT: CPT | Performed by: EMERGENCY MEDICINE

## 2023-03-29 PROCEDURE — 99285 EMERGENCY DEPT VISIT HI MDM: CPT | Mod: 25 | Performed by: EMERGENCY MEDICINE

## 2023-03-29 PROCEDURE — 250N000009 HC RX 250: Performed by: EMERGENCY MEDICINE

## 2023-03-29 PROCEDURE — 81001 URINALYSIS AUTO W/SCOPE: CPT | Performed by: EMERGENCY MEDICINE

## 2023-03-29 RX ORDER — ONDANSETRON 2 MG/ML
4 INJECTION INTRAMUSCULAR; INTRAVENOUS ONCE
Status: COMPLETED | OUTPATIENT
Start: 2023-03-29 | End: 2023-03-29

## 2023-03-29 RX ORDER — IOPAMIDOL 755 MG/ML
100 INJECTION, SOLUTION INTRAVASCULAR ONCE
Status: COMPLETED | OUTPATIENT
Start: 2023-03-29 | End: 2023-03-29

## 2023-03-29 RX ADMIN — SODIUM CHLORIDE 67 ML: 9 INJECTION, SOLUTION INTRAVENOUS at 15:45

## 2023-03-29 RX ADMIN — ONDANSETRON 4 MG: 2 INJECTION INTRAMUSCULAR; INTRAVENOUS at 13:50

## 2023-03-29 RX ADMIN — IOPAMIDOL 100 ML: 755 INJECTION, SOLUTION INTRAVENOUS at 15:45

## 2023-03-29 RX ADMIN — SODIUM CHLORIDE 1000 ML: 9 INJECTION, SOLUTION INTRAVENOUS at 13:49

## 2023-03-29 ASSESSMENT — ACTIVITIES OF DAILY LIVING (ADL)
ADLS_ACUITY_SCORE: 37

## 2023-03-29 NOTE — ED TRIAGE NOTES
"Pt has an ostomy, abd pain, nausea some emesis, \" I called my colon Dr and he told me to come to the ER \"        Triage Assessment     Row Name 03/29/23 1241       Triage Assessment (Adult)    Airway WDL WDL       Respiratory WDL    Respiratory WDL WDL       Cognitive/Neuro/Behavioral WDL    Cognitive/Neuro/Behavioral WDL WDL              "

## 2023-03-29 NOTE — ED NOTES
Abdominal pain with nausea.  Patient stated that she has been emptying her ostomy hourly but still feels like she has to have a bowel movement.

## 2023-03-29 NOTE — ED PROVIDER NOTES
"  History     Chief Complaint   Patient presents with     Ostomy     Pt has an ostomy, abd pain, nausea some emesis, \" I called my colon Dr and he told me to come to the ER \"      Abdominal Pain     Pt has an ostomy, abd pain, nausea some emesis, \" I called my colon Dr and he told me to come to the ER \"        HPI  Doretha Fernandez is a 48 year old female with past medical history significant for ulcerative colitis status post colectomy with ostomy who presents emergency department complaining of back pain and loosening stools.  Patient states for the past few days has had increasing liquid order ostomy site which is bilious in nature with history of increased in amount for the past few days and she feels a bit dehydrated.  Has been nauseated denies any fevers or chills has not had any headache or visual changes and denies any anterior abdominal pain is having some right flank pain.  She denies any leg swelling or focal numbness weakness any extremity is not any bowel or bladder dysfunction.  Denies any calf pain leg swelling or rash.    Allergies:  Allergies   Allergen Reactions     Bee Venom Swelling     Azithromycin Diarrhea     Erythromycin      Other reaction(s): GI intolerance, Vomiting     Fentanyl Other (See Comments)     sweating  sweating     Prochlorperazine Fatigue     Other reaction(s): Other (see comments)  Fatigue     Buspirone      Other reaction(s): GI intolerance  vomiting     Erythrocin Nausea and Vomiting     Topamax [Topiramate]      Made her lethargic     Zithromax [Azithromycin Dihydrate] Diarrhea     Enbrel [Etanercept] Hives and Rash       Problem List:    Patient Active Problem List    Diagnosis Date Noted     Biliary dyskinesia 01/11/2023     Priority: Medium     Migraines      Priority: Medium     Colostomy in place (H) 07/16/2021     Priority: Medium     S/P colectomy 07/16/2021     Priority: Medium     Ulcerative colitis without complications, unspecified location (H) 06/03/2021     " Priority: Medium     Dehydration 04/13/2021     Priority: Medium     Hematochezia 04/13/2021     Priority: Medium     Diarrhea of infectious origin 04/13/2021     Priority: Medium     C. difficile colitis 04/13/2021     Priority: Medium     Adjustment disorder with mixed emotional features 06/16/2020     Priority: Medium     Polyarthralgia 04/29/2020     Priority: Medium     Drug-induced polyneuropathy (H) 04/29/2020     Priority: Medium     Pain syndrome, chronic 02/12/2020     Priority: Medium     Drug-induced Cushing's syndrome (H) 02/12/2020     Priority: Medium     Diabetes mellitus, iatrogenic (H) 01/28/2020     Priority: Medium     High risk HPV infection 01/28/2020     Priority: Medium     Added automatically from request for surgery [id number]       Immunosuppression (H) 01/28/2020     Priority: Medium     Added automatically from request for surgery [id number]       STORMY (obstructive sleep apnea) 01/27/2020     Priority: Medium     1/2019 (241#)-AHI 24, lowest oxygen saturation was 86%, no periodic limb movement were noted, CPAP 8 cm/H20 was effective.       Secondary lymphedema 01/27/2020     Priority: Medium     Chronic neutrophilia 01/27/2020     Priority: Medium     Cervical high risk HPV (human papillomavirus) test positive 12/13/2019     Priority: Medium     2011 NIL pap.  2015 NIL pap, Neg HPV.  12/13/19 NIL pap , + HR HPV 16. Plan colp.   1/6/19 Failed colp exam secondary to anatomic constraints with gyn. Referred to gyn/onc.   2/25/20 Colpo bx and ECC Negative for dysplasia. Plan cotest in 1 year.   8/20/21 NIL pap, Neg HPV. Plan cotest in 1 year per provider.   9/28/22 Lost to follow-up for pap tracking        Immunosuppressed status (H) 09/05/2019     Priority: Medium     Ulcerative colitis with complication, unspecified location (H) 09/05/2019     Priority: Medium     Morbid obesity (H) 09/05/2019     Priority: Medium     Arthritis, rheumatoid (H) 11/26/2018     Priority: Medium     Hypocalcemia  05/15/2018     Priority: Medium     Anemia 05/14/2018     Priority: Medium     Menstrual irregularity 05/14/2018     Priority: Medium     Arthralgia of both knees 05/12/2018     Priority: Medium     Formatting of this note might be different from the original.  Work-up in progress. There is concern for inflammatory arthritis.       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     Priority: Medium     Malignant melanoma of left upper extremity including shoulder (H) 10/12/2017     Priority: Medium     Metastatic malignant melanoma (H) 10/10/2017     Priority: Medium     Prothrombin mutation (H) 04/05/2017     Priority: Medium     On daily aspirin 81 mg per hematology's recommendations from 2008       Vision changes 04/01/2017     Priority: Medium     Mild intermittent asthma without complication 11/08/2013     Priority: Medium     Moderate major depression (H) 06/24/2013     Priority: Medium     Anxiety state 09/13/2012     Priority: Medium     Problem list name updated by automated process. Provider to review       Esophageal reflux 09/13/2012     Priority: Medium     DUB (dysfunctional uterine bleeding) 07/28/2011     Priority: Medium     CARDIOVASCULAR SCREENING; LDL GOAL LESS THAN 160 07/28/2011     Priority: Low        Past Medical History:    Past Medical History:   Diagnosis Date     Abnormal MRI      Anxiety      Basal cell carcinoma      Cervical high risk HPV (human  papillomavirus) test positive 2019     Colitis      Depression      Diabetes mellitus, iatrogenic (H) 2020     Esophageal reflux      Inflammatory arthritis      Insomnia      Intestinal giardiasis 2018     Lumbago      Lymphedema      Malignant melanoma (H)      Melanoma (H) 10/23/2017     Migraines      Mild persistent asthma      Morbid obesity with BMI of 40.0-44.9, adult (H)      TSORMY (obstructive sleep apnea)      Prothrombin deficiency (H)      Stroke (cerebrum) (H)      TIA (transient ischemic attack)      Type 2 diabetes mellitus (H)        Past Surgical History:    Past Surgical History:   Procedure Laterality Date     APPENDECTOMY       COLONOSCOPY N/A 10/18/2017    Procedure: COLONOSCOPY;  Colon;  Surgeon: Debbie Stephens MD;  Location: [facility]     COLONOSCOPY N/A 2018    Procedure: COMBINED COLONOSCOPY, SINGLE OR MULTIPLE BIOPSY/POLYPECTOMY BY BIOPSY;  colon;  Surgeon: Benita Schumacher MD;  Location: [facility]     COLONOSCOPY      multiple since  to present - about 6 total     DISSECT LYMPH NODE AXILLA Left 10/23/2017    Procedure: DISSECT LYMPH NODE AXILLA;  Left Axillary Lymph Node Dissection ;  Surgeon: Laurent Cool MD;  Location: [facility] OR     EXAM UNDER ANESTHESIA PELVIC N/A 2020    Procedure: EXAM UNDER ANESTHESIA, PELVIS; with Cervical Biopsies, Vaginal Biopsy and Endocervical Curettings;  Surgeon: Melina Jung MD;  Location: [facility] OR     GYN SURGERY  ,          LAPAROSCOPIC ASSISTED COLECTOMY N/A 06/15/2021    Procedure: laparoscopic total abdominal colectomy, end ileostomy;  Surgeon: Quinton Ram MD;  Location: [facility]     REPAIR MOHS Left 2017    Procedure: REPAIR MOHS;  Left Upper Lid Moh's Reconstruction;  Surgeon: Kisha Bosch MD;  Location: [facility] OR       Family History:    Family History   Problem Relation Age of Onset     Cancer Mother 45        lung     Neurologic Disorder Mother         epilepsy     Lipids  Father      Gastrointestinal Disease Father         diverticulitis      Depression Father      Colitis Father      Colon Cancer Father      Diverticulitis Father      Cancer Maternal Grandmother      Blood Disease Maternal Grandmother         lymphoma      Arthritis Maternal Grandmother      Diabetes Maternal Grandmother      Depression Maternal Grandmother      Macular Degeneration Maternal Grandmother      Glaucoma Maternal Grandmother      Diabetes Maternal Grandfather      Cerebrovascular Disease Maternal Grandfather      Blood Disease Maternal Grandfather      Heart Disease Maternal Grandfather      Glaucoma Maternal Grandfather      Cancer Paternal Grandmother      Cancer - colorectal Paternal Grandmother      Colitis Paternal Grandmother      Colon Cancer Paternal Grandmother      Diverticulitis Paternal Grandmother      Respiratory Paternal Grandfather         emphysema      Colitis Paternal Grandfather      Colon Cancer Paternal Grandfather      Diverticulitis Paternal Grandfather      Heart Disease Daughter      Asthma Daughter      Depression Sister      Melanoma No family hx of        Social History:  Marital Status:   [4]  Social History     Tobacco Use     Smoking status: Former     Packs/day: 1.00     Years: 5.00     Pack years: 5.00     Types: Cigarettes     Quit date: 3/20/1998     Years since quittin.0     Smokeless tobacco: Never   Vaping Use     Vaping Use: Never used   Substance Use Topics     Alcohol use: Not Currently     Drug use: Yes     Types: Marijuana        Medications:    acetaminophen (TYLENOL) 325 MG tablet  albuterol (PROAIR HFA/PROVENTIL HFA/VENTOLIN HFA) 108 (90 Base) MCG/ACT inhaler  Calcium Carb-Cholecalciferol 600-800 MG-UNIT TABS  calcium carbonate (TUMS) 500 MG chewable tablet  Cholecalciferol (VITAMIN D3) 25 MCG TABS  cyanocobalamin (VITAMIN B-12) 1000 MCG tablet  diclofenac (VOLTAREN) 1 % topical gel  gabapentin (NEURONTIN) 600 MG tablet  loperamide (IMODIUM  A-D) 2 MG tablet  medical cannabis (Patient's own supply)  metFORMIN (GLUCOPHAGE) 500 MG tablet  methocarbamol 1000 MG TABS  ondansetron (ZOFRAN ODT) 4 MG ODT tab  [facility]  oxyCODONE-acetaminophen (PERCOCET) 5-325 MG tablet  pantoprazole (PROTONIX) 40 MG EC tablet  predniSONE (DELTASONE) 1 MG tablet  prochlorperazine (COMPAZINE) 10 MG tablet  QUEtiapine (SEROQUEL) 25 MG tablet  simethicone (MYLICON) 80 MG chewable tablet  sucralfate (CARAFATE) 1 GM tablet  venlafaxine (EFFEXOR) 75 MG tablet  VITRON-C  MG TABS tablet          Review of Systems  All systems reviewed and other than pertinent positives and negatives in HPI all other systems are negative.  Physical Exam   BP: 134/88  Pulse: 88  Temp: 98.9  F (37.2  C)  Resp: 14  Weight: 99.8 kg (220 lb)  SpO2: 99 %      Physical Exam  Vitals and nursing note reviewed.   Constitutional:       General: She is not in acute distress.     Appearance: She is well-developed. She is not ill-appearing, toxic-appearing or diaphoretic.   HENT:      Head: Normocephalic and atraumatic.      Nose: Nose normal.      Mouth/Throat:      Mouth: Mucous membranes are moist.      Pharynx: Oropharynx is clear.   Eyes:      Conjunctiva/sclera: Conjunctivae normal.   Cardiovascular:      Rate and Rhythm: Normal rate and regular rhythm.      Pulses: Normal pulses.      Heart sounds: Normal heart sounds. No murmur heard.  Pulmonary:      Effort: Pulmonary effort is normal.      Breath sounds: Normal breath sounds. No stridor. No wheezing or rhonchi.   Abdominal:      Comments: Ostomy present in the left mid quadrant.  Pink and discharging darkish green liquid.  Abdomen is soft nondistended with no significant tenderness there is some mild right flank tenderness present.  No obvious other hernias are present bowel sounds are slightly hyperactive.   Musculoskeletal:         General: No swelling or tenderness. Normal range of motion.      Cervical back: Normal range of motion and  neck supple.      Right lower leg: No edema.      Left lower leg: No edema.   Skin:     General: Skin is warm and dry.      Capillary Refill: Capillary refill takes less than 2 seconds.      Findings: No rash.   Neurological:      General: No focal deficit present.      Mental Status: She is alert and oriented to person, place, and time.      Sensory: No sensory deficit.      Motor: No weakness.      Coordination: Coordination normal.   Psychiatric:         Mood and Affect: Mood normal.         ED Course                 Procedures              Critical Care time:  none               Results for orders placed or performed during the hospital encounter of 03/29/23 (from the past 24 hour(s))   CBC with platelets differential    Narrative    The following orders were created for panel order CBC with platelets differential.  Procedure                               Abnormality         Status                     ---------                               -----------         ------                     CBC with platelets and d...[[id number]]  Abnormal            Final result                 Please view results for these tests on the individual orders.   Comprehensive metabolic panel   Result Value Ref Range    Sodium 135 (L) 136 - 145 mmol/L    Potassium 3.9 3.4 - 5.3 mmol/L    Chloride 95 (L) 98 - 107 mmol/L    Carbon Dioxide (CO2) 23 22 - 29 mmol/L    Anion Gap 17 (H) 7 - 15 mmol/L    Urea Nitrogen 12.1 6.0 - 20.0 mg/dL    Creatinine 0.77 0.51 - 0.95 mg/dL    Calcium 10.9 (H) 8.6 - 10.0 mg/dL    Glucose 118 (H) 70 - 99 mg/dL    Alkaline Phosphatase 108 (H) 35 - 104 U/L    AST 26 10 - 35 U/L    ALT 30 10 - 35 U/L    Protein Total 8.6 (H) 6.4 - 8.3 g/dL    Albumin 4.8 3.5 - 5.2 g/dL    Bilirubin Total 0.2 <=1.2 mg/dL    GFR Estimate >90 >60 mL/min/1.73m2   Lipase   Result Value Ref Range    Lipase 82 (H) 13 - 60 U/L   CBC with platelets and differential   Result Value Ref Range    WBC Count 11.8 (H) 4.0 - 11.0 10e3/uL    RBC  Count 4.80 3.80 - 5.20 10e6/uL    Hemoglobin 13.3 11.7 - 15.7 g/dL    Hematocrit 40.8 35.0 - 47.0 %    MCV 85 78 - 100 fL    MCH 27.7 26.5 - 33.0 pg    MCHC 32.6 31.5 - 36.5 g/dL    RDW 14.4 10.0 - 15.0 %    Platelet Count 400 150 - 450 10e3/uL    % Neutrophils 64 %    % Lymphocytes 27 %    % Monocytes 5 %    % Eosinophils 3 %    % Basophils 1 %    % Immature Granulocytes 0 %    NRBCs per 100 WBC 0 <1 /100    Absolute Neutrophils 7.6 1.6 - 8.3 10e3/uL    Absolute Lymphocytes 3.1 0.8 - 5.3 10e3/uL    Absolute Monocytes 0.6 0.0 - 1.3 10e3/uL    Absolute Eosinophils 0.3 0.0 - 0.7 10e3/uL    Absolute Basophils 0.1 0.0 - 0.2 10e3/uL    Absolute Immature Granulocytes 0.1 <=0.4 10e3/uL    Absolute NRBCs 0.0 10e3/uL   [place] Draw    Narrative    The following orders were created for panel order [place] Draw.  Procedure                               Abnormality         Status                     ---------                               -----------         ------                     Extra Blue Top Tube[[id number]]                              Final result               Extra Red Top Tube[[id number]]                               Final result                 Please view results for these tests on the individual orders.   Extra Blue Top Tube   Result Value Ref Range    Hold Specimen JIC    Extra Red Top Tube   Result Value Ref Range    Hold Specimen JIC    CT Abdomen Pelvis w Contrast    Narrative    CT ABDOMEN AND PELVIS WITH CONTRAST 3/29/2023 3:53 PM    CLINICAL HISTORY: Abdominal pain with ostomy status having high  output.    TECHNIQUE: CT scan of the abdomen and pelvis was performed following  injection of IV contrast. Multiplanar reformats were obtained. Dose  reduction techniques were used.    CONTRAST: 100mL Isovue 370    COMPARISON: None.    FINDINGS:   LOWER CHEST: Normal.    HEPATOBILIARY: Normal.    PANCREAS: Fatty atrophy of the dorsal pancreas, unchanged.    SPLEEN: Normal.    ADRENAL GLANDS:  Normal.    KIDNEYS/BLADDER: Normal.    BOWEL: Near total colectomy with Mejía pouch. Mild chronic wall  thickening of the pouch is unchanged. Right lower quadrant ileostomy.  Small bowel is unremarkable. No dilated or inflamed loops of small  bowel.    PELVIC ORGANS: Normal.    ADDITIONAL FINDINGS: None.    MUSCULOSKELETAL: Normal.      Impression    IMPRESSION:   No specific abnormality to explain the patient's symptoms. No bowel  obstruction. No significant interval change from previous.    ROSALIND MORRIS MD         SYSTEM ID:  [id number]   UA with Microscopic reflex to Culture    Specimen: Urine, Clean Catch   Result Value Ref Range    Color Urine Yellow Colorless, Straw, Light Yellow, Yellow    Appearance Urine Slightly Cloudy (A) Clear    Glucose Urine Negative Negative mg/dL    Bilirubin Urine Negative Negative    Ketones Urine 5 (A) Negative mg/dL    Specific Gravity Urine 1.025 1.003 - 1.035    Blood Urine Negative Negative    pH Urine 5.0 5.0 - 7.0    Protein Albumin Urine Negative Negative mg/dL    Urobilinogen Urine Normal Normal, 2.0 mg/dL    Nitrite Urine Negative Negative    Leukocyte Esterase Urine Trace (A) Negative    Mucus Urine Present (A) None Seen /LPF    RBC Urine 0 <=2 /HPF    WBC Urine 6 (H) <=5 /HPF    Squamous Epithelials Urine 2 (H) <=1 /HPF    Hyaline Casts Urine 10 (H) <=2 /LPF    Narrative    Urine Culture not indicated     *Note: Due to a large number of results and/or encounters for the requested time period, some results have not been displayed. A complete set of results can be found in Results Review.       Medications   0.9% sodium chloride BOLUS (0 mLs Intravenous Stopped 3/29/23 1948)   ondansetron (ZOFRAN) injection 4 mg (4 mg Intravenous $Given 3/29/23 1350)   iopamidol (ISOVUE-370) solution 100 mL (100 mLs Intravenous $Given 3/29/23 1545)   sodium chloride 0.9 % bag 500mL for CT scan flush use (67 mLs As instructed $Given 3/29/23 1845)       Assessments & Plan (with Medical  Decision Making) records were reviewed.  Past medical history and medications were reviewed.  Labs were obtained.  Patient was given Zofran and fluid bolus.  No recent clinical visits were noted except for office visit to dermatology.  And colorectal video clinic note from 2/24/2023.  White count was elevated 11.8 hemoglobin 13.3 platelet count 400 there is no left shift.  Comprehensive metabolic panel with a sodium 135 chloride 95 anion gap 17.  Calcium mildly elevated glucose 118 alk phos 108.  Lipase was elevated at 82.  UA with 5 ketones trace leukocyte Estrace 6 WBCs 2 squamous 10 hyaline casts a urine culture was sent.  Due to patient's presentation and pain a CT abdomen pelvis was obtained.  The images were reviewed.  This revealed no specific abnormality explain patient's symptoms.  No bowel obstruction no significant interval change from previous.  Findings discussed in detail with patient after some time.  Unfortunately there have been several sick patients and patient care was delayed and I did apologize for this.  Patient had a stool sample sent off and we are waiting for that as well as a urine culture she should push fluids take Zofran for nausea and return if worsening pain fevers chills blood in ostomy or other symptoms present.  Patient is agreement this plan at this time.     I have reviewed the nursing notes.    I have reviewed the findings, diagnosis, plan and need for follow up with the patient.           Discharge Medication List as of 3/29/2023  7:48 PM          Final diagnoses:   Abdominal pain, generalized - increased ostomy output       3/29/2023   [facility] EMERGENCY DEPT     Gorge, Anthony Jackman MD  03/29/23 2948

## 2023-03-29 NOTE — TELEPHONE ENCOUNTER
Doretha's colostomy has been filling with bile (3/4 a bag every hour). She is only drinking water and had a small bowel of cereral today. She is nauseated but not throwing up. She is having abd and lower back pain. Dark urine. Tachycardic. I told her this is not something we can address in clinic today. She needs fluids and a work up for GI upset. Requested that she present to a local ER or Urgent care.

## 2023-03-29 NOTE — TELEPHONE ENCOUNTER
Nurse Triage SBAR    Is this a 2nd Level Triage? YES, LICENSED PRACTITIONER REVIEW IS REQUIRED    Situation:   SEVERE NAUSEA AND GREATLY INCREASED COLOSTOMY DRAINAGE (BLACK, GREEN BILE LIQUID. EMPTYING EVERY 1 TO 1.5 HOURS FOR 24 HOURS.  FEELS WEAK, DIZZY AND DEHYDRATED  HAS LEFT FLANK PAIN RADIATING TO HER BACK  UNABLE TO TAKE PO AT THIS TIME  URIN OUTPUT IS DECREASED AND DARK.    Background:   46 year old female with ulcerative colitis. With parastomal hernia. a subtotal colectomy with right paramedian mid abdominal ileostomy and long Mejía's pouch    Assessment:   PATIENT NEEDS TO GO TO ER FOR EVALUATION AND REHYDRATION    Protocol Recommended Disposition:   Go To ED/UCC Now (Or To Office With PCP Approval)    Recommendation:   CALL PATIENT WITH RECOMMENDATION OF WHICH HOSPITAL TO GO TO [place] VS [place].     Routed to provider/CLINIC TEAM    Reason for Disposition    Drinking very little and dehydration suspected (e.g., no urine > 12 hours, very dry mouth, very lightheaded)    Additional Information    Negative: Shock suspected (e.g., cold/pale/clammy skin, too weak to stand, low BP, rapid pulse)    Negative: Sounds like a life-threatening emergency to the triager    Negative: Nausea or vomiting and pregnancy < 20 weeks    Negative: Menstrual Period - Missed or Late (i.e., pregnancy suspected)    Negative: Heat exhaustion suspected (i.e., dehydration from heat exposure)    Negative: Anxiety or stress suspected (i.e., nausea with anxiety attacks or stressful situations)    Negative: Traumatic Brain Injury (TBI) suspected    Negative: Vomiting occurs    Negative: Other symptom is present, see that guideline.  (e.g., chest pain, headache, dizziness, abdominal pain, colds, sore throat, etc.).    Negative: Unable to walk, or can only walk with assistance (e.g., requires support)    Negative: Difficulty breathing    Negative: Insulin-dependent diabetes (Type I) and glucose > 400 mg/dL (22 mmol/L)    Answer  "Assessment - Initial Assessment Questions  1. NAUSEA SEVERITY: \"How bad is the nausea?\" (e.g., mild, moderate, severe; dehydration, weight loss)    - MILD: loss of appetite without change in eating habits    - MODERATE: decreased oral intake without significant weight loss, dehydration, or malnutrition    - SEVERE: inadequate caloric or fluid intake, significant weight loss, symptoms of dehydration      Severe - not eating or drinking    2. ONSET: \"When did the nausea begin?\"      During the day yesterday    3. VOMITING: \"Any vomiting?\" If Yes, ask: \"How many times today?\"      No    4. RECURRENT SYMPTOM: \"Have you had nausea before?\" If Yes, ask: \"When was the last time?\" \"What happened that time?\"      New    5. CAUSE: \"What do you think is causing the nausea?\"      Unknown        6. PREGNANCY: \"Is there any chance you are pregnant?\" (e.g., unprotected intercourse, missed birth control pill, broken condom)      No    ADDITIONAL NOTES:  Patient calls today with severe nausea and largely increased colostomy out put.  Is needing to empty bag every 1 to 1.5 hours of large amounts of black/green bile liquid.  Feels weak and dizzy - feels dehydrated.    Left Flank pain going to the back  Feels the need to pass diarrhea rectaly - but has colostomy    Protocols used: NAUSEA-A-OH      "

## 2023-03-30 NOTE — DISCHARGE INSTRUCTIONS
Return if symptoms worsen or new symptoms develop.  We will await culture results from the output of the ostomy.  Drink plenty of fluids and stay hydrated.  If any worsening abdominal pain vomiting blood in your ostomy output or weakness lightheadedness or other symptoms present please return for recheck.  We will call you with culture results.

## 2023-03-31 DIAGNOSIS — M06.4 RHEUMATOID ARTHRITIS WITH INFLAMMATORY POLYARTHROPATHY (H): Primary | ICD-10-CM

## 2023-03-31 LAB — BACTERIA UR CULT: NORMAL

## 2023-04-10 ENCOUNTER — LAB (OUTPATIENT)
Dept: LAB | Facility: CLINIC | Age: 48
End: 2023-04-10
Payer: COMMERCIAL

## 2023-04-10 DIAGNOSIS — E13.9 DIABETES MELLITUS, IATROGENIC (H): ICD-10-CM

## 2023-04-10 DIAGNOSIS — M06.4 RHEUMATOID ARTHRITIS WITH INFLAMMATORY POLYARTHROPATHY (H): ICD-10-CM

## 2023-04-10 LAB
AST SERPL W P-5'-P-CCNC: 29 U/L (ref 10–35)
BASOPHILS # BLD AUTO: 0 10E3/UL (ref 0–0.2)
BASOPHILS NFR BLD AUTO: 0 %
CREAT SERPL-MCNC: 0.77 MG/DL (ref 0.51–0.95)
CRP SERPL-MCNC: 20.73 MG/L
EOSINOPHIL # BLD AUTO: 0.4 10E3/UL (ref 0–0.7)
EOSINOPHIL NFR BLD AUTO: 5 %
ERYTHROCYTE [DISTWIDTH] IN BLOOD BY AUTOMATED COUNT: 14.4 % (ref 10–15)
ERYTHROCYTE [SEDIMENTATION RATE] IN BLOOD BY WESTERGREN METHOD: 29 MM/HR (ref 0–20)
GFR SERPL CREATININE-BSD FRML MDRD: >90 ML/MIN/1.73M2
HBA1C MFR BLD: 6.4 % (ref 0–5.6)
HCT VFR BLD AUTO: 38.3 % (ref 35–47)
HGB BLD-MCNC: 12.5 G/DL (ref 11.7–15.7)
IMM GRANULOCYTES # BLD: 0 10E3/UL
IMM GRANULOCYTES NFR BLD: 0 %
LYMPHOCYTES # BLD AUTO: 2.7 10E3/UL (ref 0.8–5.3)
LYMPHOCYTES NFR BLD AUTO: 29 %
MCH RBC QN AUTO: 27.8 PG (ref 26.5–33)
MCHC RBC AUTO-ENTMCNC: 32.6 G/DL (ref 31.5–36.5)
MCV RBC AUTO: 85 FL (ref 78–100)
MONOCYTES # BLD AUTO: 0.8 10E3/UL (ref 0–1.3)
MONOCYTES NFR BLD AUTO: 8 %
NEUTROPHILS # BLD AUTO: 5.3 10E3/UL (ref 1.6–8.3)
NEUTROPHILS NFR BLD AUTO: 57 %
PLATELET # BLD AUTO: 416 10E3/UL (ref 150–450)
RBC # BLD AUTO: 4.5 10E6/UL (ref 3.8–5.2)
WBC # BLD AUTO: 9.2 10E3/UL (ref 4–11)

## 2023-04-10 PROCEDURE — 85652 RBC SED RATE AUTOMATED: CPT

## 2023-04-10 PROCEDURE — 84450 TRANSFERASE (AST) (SGOT): CPT

## 2023-04-10 PROCEDURE — 36415 COLL VENOUS BLD VENIPUNCTURE: CPT

## 2023-04-10 PROCEDURE — 85025 COMPLETE CBC W/AUTO DIFF WBC: CPT

## 2023-04-10 PROCEDURE — 82565 ASSAY OF CREATININE: CPT

## 2023-04-10 PROCEDURE — 83036 HEMOGLOBIN GLYCOSYLATED A1C: CPT

## 2023-04-10 PROCEDURE — 86140 C-REACTIVE PROTEIN: CPT

## 2023-04-16 ENCOUNTER — HEALTH MAINTENANCE LETTER (OUTPATIENT)
Age: 48
End: 2023-04-16

## 2023-04-17 ENCOUNTER — TELEPHONE (OUTPATIENT)
Dept: FAMILY MEDICINE | Facility: CLINIC | Age: 48
End: 2023-04-17
Payer: COMMERCIAL

## 2023-04-17 NOTE — TELEPHONE ENCOUNTER
General Call      Reason for Call:      What are your questions or concerns:  Pt calling because her last A1C came back high so she stopped taking her metformin and prednisone. She is wondering if she should go back on these meds yet, or if she should have repeat labs to test levels. Had labs done 4/10 and A1C came back at 6.4. Wondering what her next steps are.      Could we send this information to you in [employer] or would you prefer to receive a phone call?:   Patient would prefer a phone call   Okay to leave a detailed message?: Yes at Home number on file [phone] (home)

## 2023-04-18 NOTE — TELEPHONE ENCOUNTER
I would restart the metformin and she can see me in clinic to discuss further.    Anna Naidu M.D.

## 2023-04-18 NOTE — TELEPHONE ENCOUNTER
Pt was called and given Dr Naidu's message. Pt said she called pharmacy and was told there is not a metformin prescription there. Pharmacy was called, staff said the prescription was filled last month but pt did not pick it up so it was returned after 10 days. The will fill it for her to  today. Floridalma Wilde RN

## 2023-04-23 NOTE — PROGRESS NOTES
----- Message -----      From: Mike Pearce RN      Sent: 9/11/2017   4:16 PM        To: Stefanie Childs RN   Subject: Headache breakthrough medication request         Patient called to clarify medication orders. Let her know that she can take up to 1200mg of Neurontin and 200mg of Topiramate  Per Dr Miranda's note on 8/24.   Pt c/o sever pain 9/10 and wondering what she should do or if there is something she can take for breakthrough pain as Dr Miranda doesn't want her taking Tylenol.   Patient can be reached at [phone]   ==================================  9/12/17:  Returned a call to Doretha.  She is experiencing a migraine with nausea and vomiting and states that the pain is a 10/10 and she is not on any rescue medication for her migraines.  She is at work but is very weepy and is quite beside herself with pain.  I advised that she needs to go to her ED for immediate pain control which she agrees.  I told her that I would send a message to Dr. Miranda and ask him about rescue medications and call her back with his response.  She is in agreement with this plan.    Message sent to Dr. Miranda.   Alert-The patient is alert, awake and responds to voice. The patient is oriented to time, place, and person. The triage nurse is able to obtain subjective information.

## 2023-05-23 ENCOUNTER — PATIENT OUTREACH (OUTPATIENT)
Dept: CARE COORDINATION | Facility: CLINIC | Age: 48
End: 2023-05-23
Payer: COMMERCIAL

## 2023-06-01 ENCOUNTER — TRANSFERRED RECORDS (OUTPATIENT)
Dept: MULTI SPECIALTY CLINIC | Facility: CLINIC | Age: 48
End: 2023-06-01

## 2023-06-01 LAB — RETINOPATHY: NORMAL

## 2023-06-02 NOTE — PROGRESS NOTES
"Colon and Rectal Surgery Clinic Note    RE: Doretha Fernandez.  : 1975.  JUNIOR: 2023.    Reason for visit: follow up     HPI:  Doretha Fernandez is a 46 year old female with ulcerative colitis. She presents today for a follow up to discuss proctectomy and J pouch.  On 6/15/2021 I took her to the OR for a TAC with end ileostomy (laparoscopic). She does currently have a parastomal hernia. Last time I saw Doretha I continued to encourage weight loss and titrating off steroids as I will not be able to create a pouch with Doretha on prednisone. Her upper GI on 10/4/2022 showed mild chronic inflammation in the gastric antrum and LA grade A esophagitis with no bleeding in the gastroesophageal junction. Her MRE in 2022 showed a subtotal colectomy with right paramedian mid abdominal ileostomy and long Mejía's pouch. No findings specific for acute inflammation of the bowel. Multiple prominent pericolonic/perirectal lymph nodes, similar to comparison. Fat-containing noninflamed parastomal hernia. Pancreatic atrophy.     She was admitted from 2023-2023 for acute on chronic abdominal pain with nausea and vomiting consistent with non-bleeding gastritis and esophagitis which was treated conservatively at the [facility]. It was noted that she has gallbladder dysfunction but surgery was not recommended at that time due to ileostomy location (this was at [facility]).  At the [place] she was not very satisfied with her care and felt like she left \"in the same pain\" as she presented.  She wanted an upper GI scope, but did not receive one.       She then went down to [place].  Upper GI scope was performed that showed some reflux esophagitis.  Biopsies of the esophagus were negative for eosinophilic esophagitis and it is believed the inflammation is due to GERD.  Her pantoprazole was changed to dexlansoprazole by Dr Sanford, her GI MD down in [place].  She also had a biopsy of the second portion of " her duodenum which on pathology showed changes possibly consistent with celiac disease.  She has now been on a gluten free diet per Dr Sanford.    Interval History:     Weight loss: >50 lbs since February, currently on phentermine     Prednisone: none since January 2023    Ileostomy output: she is not measuring it       Upper GI (10/4/2022):  Findings:        LA Grade A (one or more mucosal breaks less than 5 mm, not extending        between tops of 2 mucosal folds) esophagitis with no bleeding was found        at the gastroesophageal junction.        Localized mild inflammation was found in the gastric antrum. Biopsies        were taken with a cold forceps for histology. No evidence for recurrent        melanoma.        The examined duodenum was normal.        Surgical Pathology (10/4/2022):    FINAL DIAGNOSIS   A.  Stomach, antrum, body, endoscopic biopsy:  Fundic-type   mucosa with mild chronic inflammation and focal active   inflammation.  Antral mucosa with mild reactive epithelial   change.  No intestinal metaplasia.  An immunohistochemical   stain for Helicobacter pylori is pending and will be   reported in an addendum        MRE (12/27/2022):  IMPRESSION:   1. Status post subtotal colectomy with right paramedian mid abdominal  ileostomy and long Mejía's pouch.  2. No findings specific for acute inflammation of the bowel.  3. Multiple prominent pericolonic/perirectal lymph nodes, similar to  comparison.  4. Fat-containing noninflamed parastomal hernia.   5. Pancreatic atrophy    Medications:  Current Outpatient Medications   Medication Sig Dispense Refill     acetaminophen (TYLENOL) 325 MG tablet Take 3 tablets (975 mg) by mouth every 8 hours As scheduled for the next 7-10 days, then decrease both dose & frequency to as needed there after. 90 tablet 0     albuterol (PROAIR HFA/PROVENTIL HFA/VENTOLIN HFA) 108 (90 Base) MCG/ACT inhaler Inhale 2 puffs into the lungs every 4 hours as needed for shortness of  breath / dyspnea 18 g 1     Calcium Carb-Cholecalciferol 600-800 MG-UNIT TABS Take 800 mg by mouth every morning (before breakfast) 90 tablet 3     calcium carbonate (TUMS) 500 MG chewable tablet Take 1 tablet (500 mg) by mouth 3 times daily as needed for heartburn 30 tablet 0     Cholecalciferol (VITAMIN D3) 25 MCG TABS TAKE THREE TABLETS BY MOUTH ONCE DAILY 300 tablet 1     cyanocobalamin (VITAMIN B-12) 1000 MCG tablet Take 1 tablet (1,000 mcg) by mouth daily       diclofenac (VOLTAREN) 1 % topical gel Apply 4 g topically 4 times daily 150 g 0     gabapentin (NEURONTIN) 600 MG tablet TAKE ONE TABLET (600MG) BY MOUTH FOUR TIMES A DAY (Patient taking differently: Take 600 mg by mouth daily) 120 tablet 5     loperamide (IMODIUM A-D) 2 MG tablet Take 2 tabs daily and increase as needed until output is apple sauce consistency. Max of 8 tabs (16 mg) per day. 240 tablet 3     medical cannabis (Patient's own supply) See Admin Instructions (The purpose of this order is to document that the patient reports taking medical cannabis.  This is not a prescription, and is not used to certify that the patient has a qualifying medical condition.)       metFORMIN (GLUCOPHAGE) 500 MG tablet TAKE 2 TABLETS BY MOUTH 2 TIMES DAILY WITH MEALS ** OFFICE VISIT DUE/LAST FILL ** 360 tablet 0     methocarbamol 1000 MG TABS Take 1,000 mg by mouth 3 times daily as needed for muscle spasms 21 tablet 0     ondansetron (ZOFRAN ODT) 4 MG ODT tab Take 1 tablet (4 mg) by mouth every 8 hours as needed for nausea or vomiting 10 tablet 1     Ostomy Supplies MISC 20 each daily 20 each 11     oxyCODONE-acetaminophen (PERCOCET) 5-325 MG tablet Take 1 tablet by mouth every 6 hours as needed for severe pain (7-10) 12 tablet 0     predniSONE (DELTASONE) 1 MG tablet Take 1 tablet (1 mg) by mouth daily Taper 1 mg every 2 weeks    Continue taper as previously instructed. Current dose 1 mg daily (Patient not taking: Reported on 2/24/2023) 10 tablet 0      "prochlorperazine (COMPAZINE) 10 MG tablet Take 1 tablet (10 mg) by mouth every 6 hours as needed for vomiting 10 tablet 0     QUEtiapine (SEROQUEL) 25 MG tablet Take 1 tablet (25 mg) by mouth 2 times daily as needed (Anxiety) 10 tablet 0     simethicone (MYLICON) 80 MG chewable tablet Take 1-2 tablets ( mg) by mouth 4 times daily as needed for cramping 120 tablet 0     sucralfate (CARAFATE) 1 GM tablet Take 1 tablet (1 g) by mouth 4 times daily as needed for nausea (esophagitis pain) 21 tablet 1     venlafaxine (EFFEXOR) 75 MG tablet TAKE TWO TABLETS BY MOUTH TWICE A  tablet 2     VITRON-C  MG TABS tablet Take 1 tablet by mouth daily 30 tablet 1       ROS:  A complete review of systems was performed with the patient and all systems negative except as per HPI.    Physical Examination:  There were no vitals taken for this visit.  General: Well hydrated. No acute distress.  Abdomen: Soft, NT, not distended, well healed laparoscopic port sites, RLQ ileostomy    ASSESSMENT  47 y/o lady s/p TAC for UC, now ready for robotic proctectomy, IPAA, DLI.     Risks, benefits, and alternatives of operative treatment were thoroughly discussed with the patient, he/she understands these well and agrees to proceed.    PLAN  1. OR for proctectomy (robotic), IPAA, DLI  2. PAC  3. Continue weight loss  4. Risks and benefits of IPAA discussed with Doretha including possibility of \"reach\" issues at time of pouch creation, failure of anastomosis to heal requiring further time with DLI and possible other interventions, reasonable expectation for \"pouch\" life (average 6 BM's per day, pudding-like consistency of stool, possible need for antimotility medications, etc).    30 minutes spent on the date of the encounter doing chart review, history and exam, imaging review, documentation and further activities as noted above.    Quinton Ram MD, PhD    Division of Colon and Rectal Surgery  [facility]    Referring Provider:  No referring provider defined for this encounter.     Primary Care Provider:  Anna Naidu

## 2023-06-13 ENCOUNTER — OFFICE VISIT (OUTPATIENT)
Dept: SURGERY | Facility: CLINIC | Age: 48
End: 2023-06-13
Payer: COMMERCIAL

## 2023-06-13 VITALS
WEIGHT: 211 LBS | BODY MASS INDEX: 37.39 KG/M2 | HEIGHT: 63 IN | DIASTOLIC BLOOD PRESSURE: 73 MMHG | HEART RATE: 91 BPM | SYSTOLIC BLOOD PRESSURE: 106 MMHG | OXYGEN SATURATION: 100 %

## 2023-06-13 DIAGNOSIS — K51.018 ULCERATIVE PANCOLITIS WITH OTHER COMPLICATION (H): Primary | ICD-10-CM

## 2023-06-13 PROCEDURE — 99214 OFFICE O/P EST MOD 30 MIN: CPT | Performed by: SURGERY

## 2023-06-13 RX ORDER — METRONIDAZOLE 500 MG/1
500 TABLET ORAL EVERY 6 HOURS
Qty: 3 TABLET | Refills: 0 | Status: SHIPPED | OUTPATIENT
Start: 2023-06-13 | End: 2023-06-13

## 2023-06-13 RX ORDER — PHENTERMINE HYDROCHLORIDE 15 MG/1
15 CAPSULE ORAL EVERY MORNING
Status: ON HOLD | COMMUNITY
Start: 2023-04-12 | End: 2023-11-08

## 2023-06-13 RX ORDER — ONDANSETRON 8 MG/1
TABLET, ORALLY DISINTEGRATING ORAL
COMMUNITY
Start: 2023-01-05 | End: 2023-07-11

## 2023-06-13 RX ORDER — NEOMYCIN SULFATE 500 MG/1
1000 TABLET ORAL EVERY 6 HOURS
Qty: 6 TABLET | Refills: 0 | Status: SHIPPED | OUTPATIENT
Start: 2023-06-13 | End: 2023-06-13

## 2023-06-13 RX ORDER — POLYETHYLENE GLYCOL 3350 17 G/17G
238 POWDER, FOR SOLUTION ORAL SEE ADMIN INSTRUCTIONS
Qty: 14 PACKET | Refills: 0 | Status: SHIPPED | OUTPATIENT
Start: 2023-06-13 | End: 2023-06-13

## 2023-06-13 RX ORDER — ONDANSETRON 4 MG/1
4 TABLET, FILM COATED ORAL EVERY 6 HOURS
Qty: 3 TABLET | Refills: 0 | Status: SHIPPED | OUTPATIENT
Start: 2023-06-13 | End: 2023-06-13

## 2023-06-13 ASSESSMENT — PAIN SCALES - GENERAL: PAINLEVEL: SEVERE PAIN (6)

## 2023-06-13 NOTE — PATIENT INSTRUCTIONS
Follow up:  Schedule surgery with Cathy at [phone]  Pre op physical  Labs   Wound ostomy nurse visit       Please call with any questions or concerns regarding your clinic visit today.    It is a pleasure being involved in your health care.    Contacts post-consultation depending on your need:    Radiology Appointments [phone]    Schedule Clinic Appointments [phone] # 1   M-F 7:30 - 5 pm    [facility] [phone]    Clinic Fax Number [phone]    Surgery Scheduling [phone]    My Chart is available 24 hours a day and is a secure way to access your records and communicate with your care team.  I strongly recommend signing up if you haven't already done so, if you are comfortable with computers.  If you would like to inquire about this or are having problems with My Chart access, you may call [phone] or go online at [email or web address].  Please allow at least 24 hours for a response and extra time on weekends and Holidays.

## 2023-06-13 NOTE — LETTER
"2023       RE: Doretha Fernandez  [address]  [facility] [facility] [address]     Dear Colleague,    Thank you for referring your patient, Doretha Fernandez, to the [facility] [place] at [facility]. Please see a copy of my visit note below.    Colon and Rectal Surgery Clinic Note    RE: Doretha Fernandez.  : 1975.  JUNIOR: 2023.    Reason for visit: follow up     HPI:  Doretha Fernandez is a 46 year old female with ulcerative colitis. She presents today for a follow up to discuss proctectomy and J pouch.  On 6/15/2021 I took her to the OR for a TAC with end ileostomy (laparoscopic). She does currently have a parastomal hernia. Last time I saw Doretha I continued to encourage weight loss and titrating off steroids as I will not be able to create a pouch with Doretha on prednisone. Her upper GI on 10/4/2022 showed mild chronic inflammation in the gastric antrum and LA grade A esophagitis with no bleeding in the gastroesophageal junction. Her MRE in 2022 showed a subtotal colectomy with right paramedian mid abdominal ileostomy and long Mejía's pouch. No findings specific for acute inflammation of the bowel. Multiple prominent pericolonic/perirectal lymph nodes, similar to comparison. Fat-containing noninflamed parastomal hernia. Pancreatic atrophy.     She was admitted from 2023-2023 for acute on chronic abdominal pain with nausea and vomiting consistent with non-bleeding gastritis and esophagitis which was treated conservatively at the [facility]. It was noted that she has gallbladder dysfunction but surgery was not recommended at that time due to ileostomy location (this was at [facility]).  At the [place] she was not very satisfied with her care and felt like she left \"in the same pain\" as she presented.  She wanted an upper GI scope, but did not receive one.       She then went down to [place].  " Upper GI scope was performed that showed some reflux esophagitis.  Biopsies of the esophagus were negative for eosinophilic esophagitis and it is believed the inflammation is due to GERD.  Her pantoprazole was changed to dexlansoprazole by Dr Sanford, her GI MD down in [place].  She also had a biopsy of the second portion of her duodenum which on pathology showed changes possibly consistent with celiac disease.  She has now been on a gluten free diet per Dr Sanford.    Interval History:     Weight loss: >50 lbs since February, currently on phentermine     Prednisone: none since January 2023    Ileostomy output: she is not measuring it     Upper GI (10/4/2022):  Findings:        LA Grade A (one or more mucosal breaks less than 5 mm, not extending        between tops of 2 mucosal folds) esophagitis with no bleeding was found        at the gastroesophageal junction.        Localized mild inflammation was found in the gastric antrum. Biopsies        were taken with a cold forceps for histology. No evidence for recurrent        melanoma.        The examined duodenum was normal.      Surgical Pathology (10/4/2022):    FINAL DIAGNOSIS   A.  Stomach, antrum, body, endoscopic biopsy:  Fundic-type   mucosa with mild chronic inflammation and focal active   inflammation.  Antral mucosa with mild reactive epithelial   change.  No intestinal metaplasia.  An immunohistochemical   stain for Helicobacter pylori is pending and will be   reported in an addendum      MRE (12/27/2022):  IMPRESSION:   1. Status post subtotal colectomy with right paramedian mid abdominal  ileostomy and long Mejía's pouch.  2. No findings specific for acute inflammation of the bowel.  3. Multiple prominent pericolonic/perirectal lymph nodes, similar to  comparison.  4. Fat-containing noninflamed parastomal hernia.   5. Pancreatic atrophy    Medications:  Current Outpatient Medications   Medication Sig Dispense Refill    acetaminophen (TYLENOL) 325 MG  tablet Take 3 tablets (975 mg) by mouth every 8 hours As scheduled for the next 7-10 days, then decrease both dose & frequency to as needed there after. 90 tablet 0    albuterol (PROAIR HFA/PROVENTIL HFA/VENTOLIN HFA) 108 (90 Base) MCG/ACT inhaler Inhale 2 puffs into the lungs every 4 hours as needed for shortness of breath / dyspnea 18 g 1    Calcium Carb-Cholecalciferol 600-800 MG-UNIT TABS Take 800 mg by mouth every morning (before breakfast) 90 tablet 3    calcium carbonate (TUMS) 500 MG chewable tablet Take 1 tablet (500 mg) by mouth 3 times daily as needed for heartburn 30 tablet 0    Cholecalciferol (VITAMIN D3) 25 MCG TABS TAKE THREE TABLETS BY MOUTH ONCE DAILY 300 tablet 1    cyanocobalamin (VITAMIN B-12) 1000 MCG tablet Take 1 tablet (1,000 mcg) by mouth daily      diclofenac (VOLTAREN) 1 % topical gel Apply 4 g topically 4 times daily 150 g 0    gabapentin (NEURONTIN) 600 MG tablet TAKE ONE TABLET (600MG) BY MOUTH FOUR TIMES A DAY (Patient taking differently: Take 600 mg by mouth daily) 120 tablet 5    loperamide (IMODIUM A-D) 2 MG tablet Take 2 tabs daily and increase as needed until output is apple sauce consistency. Max of 8 tabs (16 mg) per day. 240 tablet 3    medical cannabis (Patient's own supply) See Admin Instructions (The purpose of this order is to document that the patient reports taking medical cannabis.  This is not a prescription, and is not used to certify that the patient has a qualifying medical condition.)      metFORMIN (GLUCOPHAGE) 500 MG tablet TAKE 2 TABLETS BY MOUTH 2 TIMES DAILY WITH MEALS ** OFFICE VISIT DUE/LAST FILL ** 360 tablet 0    methocarbamol 1000 MG TABS Take 1,000 mg by mouth 3 times daily as needed for muscle spasms 21 tablet 0    ondansetron (ZOFRAN ODT) 4 MG ODT tab Take 1 tablet (4 mg) by mouth every 8 hours as needed for nausea or vomiting 10 tablet 1    Ostomy Supplies MISC 20 each daily 20 each 11    oxyCODONE-acetaminophen (PERCOCET) 5-325 MG tablet Take 1  "tablet by mouth every 6 hours as needed for severe pain (7-10) 12 tablet 0    predniSONE (DELTASONE) 1 MG tablet Take 1 tablet (1 mg) by mouth daily Taper 1 mg every 2 weeks    Continue taper as previously instructed. Current dose 1 mg daily (Patient not taking: Reported on 2/24/2023) 10 tablet 0    prochlorperazine (COMPAZINE) 10 MG tablet Take 1 tablet (10 mg) by mouth every 6 hours as needed for vomiting 10 tablet 0    QUEtiapine (SEROQUEL) 25 MG tablet Take 1 tablet (25 mg) by mouth 2 times daily as needed (Anxiety) 10 tablet 0    simethicone (MYLICON) 80 MG chewable tablet Take 1-2 tablets ( mg) by mouth 4 times daily as needed for cramping 120 tablet 0    sucralfate (CARAFATE) 1 GM tablet Take 1 tablet (1 g) by mouth 4 times daily as needed for nausea (esophagitis pain) 21 tablet 1    venlafaxine (EFFEXOR) 75 MG tablet TAKE TWO TABLETS BY MOUTH TWICE A  tablet 2    VITRON-C  MG TABS tablet Take 1 tablet by mouth daily 30 tablet 1       ROS:  A complete review of systems was performed with the patient and all systems negative except as per HPI.    Physical Examination:  There were no vitals taken for this visit.  General: Well hydrated. No acute distress.  Abdomen: Soft, NT, not distended, well healed laparoscopic port sites, RLQ ileostomy    ASSESSMENT  47 y/o lady s/p TAC for UC, now ready for robotic proctectomy, IPAA, DLI.     Risks, benefits, and alternatives of operative treatment were thoroughly discussed with the patient, he/she understands these well and agrees to proceed.    PLAN  1. OR for proctectomy (robotic), IPAA, DLI  2. PAC  3. Continue weight loss  4. Risks and benefits of IPAA discussed with Doretha including possibility of \"reach\" issues at time of pouch creation, failure of anastomosis to heal requiring further time with DLI and possible other interventions, reasonable expectation for \"pouch\" life (average 6 BM's per day, pudding-like consistency of stool, possible need for " antimotility medications, etc).    30 minutes spent on the date of the encounter doing chart review, history and exam, imaging review, documentation and further activities as noted above.    Quinton Ram MD, PhD    Division of Colon and Rectal Surgery  [facility]    Referring Provider:  No referring provider defined for this encounter.     Primary Care Provider:  Anna Naidu

## 2023-06-13 NOTE — NURSING NOTE
"Chief Complaint   Patient presents with     Follow Up     Discuss surgery       Vitals:    06/13/23 1259   BP: 106/73   Pulse: 91   SpO2: 100%   Weight: 211 lb   Height: 5' 3\"       Body mass index is 37.38 kg/m .      aDi Moreno RN  "

## 2023-06-19 ENCOUNTER — TELEPHONE (OUTPATIENT)
Dept: SURGERY | Facility: CLINIC | Age: 48
End: 2023-06-19
Payer: COMMERCIAL

## 2023-06-19 NOTE — TELEPHONE ENCOUNTER
Dr. Flores's next surgery date with access to a robot is Weds August 30, 2023.     On 6/19/2023 at 7:33 AM:  Received  msg from patient wanting to schedule her robotic surgery with Dr. Flores.     Sent message to Dr. Flores asking if August 30 is soon enough, or if he's willing to possibly do a 2nd case robot (not ideal), or a wait list date to try to do patient's surgery sooner. Awaiting response.    Sent message for clarification as to whether case is supposed to be assigned to Dr. Ram.    Cathy Terrazas  Misti-op Coordinator  [place]  Direct Phone: [phone]

## 2023-06-26 ENCOUNTER — TELEPHONE (OUTPATIENT)
Dept: SURGERY | Facility: CLINIC | Age: 48
End: 2023-06-26
Payer: COMMERCIAL

## 2023-06-26 ENCOUNTER — PREP FOR PROCEDURE (OUTPATIENT)
Dept: SURGERY | Facility: CLINIC | Age: 48
End: 2023-06-26
Payer: COMMERCIAL

## 2023-06-26 NOTE — TELEPHONE ENCOUNTER
On 6/26/2023  Messaged with Dr. Ram regarding possible robotic surgery dates. The soonest date and only date available with a robot through and of September is Tuesday September 5, 2023.    On 6/26/2023 at 2:04 PM:  Spoke with patient via phone, she approved robotic surgery date 9/5/2023 with Dr. Quinton Ram. Scheduled related appts. Will send Surgery Packet via [employer] and Mail.    Cathy Terrazas  Misti-op Coordinator  [place]  Direct Phone: [phone]

## 2023-06-27 ENCOUNTER — PREP FOR PROCEDURE (OUTPATIENT)
Dept: SURGERY | Facility: CLINIC | Age: 48
End: 2023-06-27
Payer: COMMERCIAL

## 2023-07-05 NOTE — TELEPHONE ENCOUNTER
FUTURE VISIT INFORMATION      SURGERY INFORMATION:    Date: 23    Location: uu or    Surgeon:  Quinton Ram MD    Anesthesia Type:  general    Procedure: COMPLETION PROCTECTOMY, ROBOT-ASSISTED, ILEAL POUCH ANASTAMOSIS    RECORDS REQUESTED FROM:       Primary Care Provider: Anna Naidu MD- [facility]    Pertinent Medical History: STORMY    Most recent EKG+ Tracin23    Most recent ECHO: 21

## 2023-07-05 NOTE — TELEPHONE ENCOUNTER
FUTURE VISIT INFORMATION        SURGERY INFORMATION:    Date: 23    Location: uu or    Surgeon:  Quinton Ram MD    Anesthesia Type:  general    Procedure: COMPLETION PROCTECTOMY, ROBOT-ASSISTED, ILEAL POUCH ANASTAMOSIS     RECORDS REQUESTED FROM:         Primary Care Provider: Anna Naidu MD- [facility]     Pertinent Medical History: SOTRMY     Most recent EKG+ Tracin23     Most recent ECHO: 21

## 2023-07-10 ENCOUNTER — TELEPHONE (OUTPATIENT)
Dept: FAMILY MEDICINE | Facility: CLINIC | Age: 48
End: 2023-07-10
Payer: COMMERCIAL

## 2023-07-10 DIAGNOSIS — F32.9 CURRENT EPISODE OF MAJOR DEPRESSIVE DISORDER WITHOUT PRIOR EPISODE, UNSPECIFIED DEPRESSION EPISODE SEVERITY: Primary | ICD-10-CM

## 2023-07-10 RX ORDER — VENLAFAXINE HYDROCHLORIDE 150 MG/1
150 TABLET, EXTENDED RELEASE ORAL 2 TIMES DAILY
Qty: 180 TABLET | Refills: 0 | Status: CANCELLED | OUTPATIENT
Start: 2023-07-10

## 2023-07-10 RX ORDER — VENLAFAXINE HYDROCHLORIDE 150 MG/1
150 TABLET, EXTENDED RELEASE ORAL 2 TIMES DAILY
Qty: 180 TABLET | Refills: 0 | Status: SHIPPED | OUTPATIENT
Start: 2023-07-10 | End: 2023-07-20

## 2023-07-10 NOTE — TELEPHONE ENCOUNTER
Prior Authorization Retail Medication Request    Medication/Dose: Venlafaxine 150mg tablet  ICD code (if different than what is on RX):  na  Previously Tried and Failed:  na  Rationale:  na    Insurance Name:  [facility]  Insurance ID:  [id number]      Pharmacy Information (if different than what is on RX)  Name:  [facility]  Phone:  [phone]

## 2023-07-10 NOTE — TELEPHONE ENCOUNTER
"Central Prior Authorization Team - Phone: [phone]     PA Initiation    Medication: VENLAFAXINE HCL  MG PO TB24  Insurance Company: [employer] - Phone [phone] Fax [phone]  Pharmacy Filling the Rx: [facility] [place], [place] - [address]  Filling Pharmacy Phone: [phone]  Filling Pharmacy Fax:    Start Date: 7/10/2023    Insurance has last name as 'Aimee\"        "

## 2023-07-11 ENCOUNTER — LAB (OUTPATIENT)
Dept: LAB | Facility: CLINIC | Age: 48
End: 2023-07-11
Payer: COMMERCIAL

## 2023-07-11 ENCOUNTER — OFFICE VISIT (OUTPATIENT)
Dept: FAMILY MEDICINE | Facility: CLINIC | Age: 48
End: 2023-07-11
Payer: COMMERCIAL

## 2023-07-11 VITALS
BODY MASS INDEX: 36.14 KG/M2 | OXYGEN SATURATION: 97 % | RESPIRATION RATE: 20 BRPM | HEART RATE: 85 BPM | WEIGHT: 204 LBS | DIASTOLIC BLOOD PRESSURE: 70 MMHG | SYSTOLIC BLOOD PRESSURE: 122 MMHG | HEIGHT: 63 IN | TEMPERATURE: 97.9 F

## 2023-07-11 DIAGNOSIS — E13.9 DIABETES MELLITUS, IATROGENIC (H): ICD-10-CM

## 2023-07-11 DIAGNOSIS — A09 ACUTE INFECTIOUS DIARRHEA: Primary | ICD-10-CM

## 2023-07-11 DIAGNOSIS — F32.9 CURRENT EPISODE OF MAJOR DEPRESSIVE DISORDER WITHOUT PRIOR EPISODE, UNSPECIFIED DEPRESSION EPISODE SEVERITY: ICD-10-CM

## 2023-07-11 DIAGNOSIS — Z62.820 PARENT-CHILD CONFLICT: ICD-10-CM

## 2023-07-11 LAB
ALBUMIN SERPL BCG-MCNC: 4.3 G/DL (ref 3.5–5.2)
ALP SERPL-CCNC: 137 U/L (ref 35–104)
ALT SERPL W P-5'-P-CCNC: 88 U/L (ref 0–50)
ANION GAP SERPL CALCULATED.3IONS-SCNC: 14 MMOL/L (ref 7–15)
AST SERPL W P-5'-P-CCNC: 56 U/L (ref 0–45)
BASOPHILS # BLD AUTO: 0.1 10E3/UL (ref 0–0.2)
BASOPHILS NFR BLD AUTO: 1 %
BILIRUB SERPL-MCNC: 0.2 MG/DL
BUN SERPL-MCNC: 8.9 MG/DL (ref 6–20)
CALCIUM SERPL-MCNC: 10.2 MG/DL (ref 8.6–10)
CHLORIDE SERPL-SCNC: 106 MMOL/L (ref 98–107)
CHOLEST SERPL-MCNC: 186 MG/DL
CREAT SERPL-MCNC: 0.71 MG/DL (ref 0.51–0.95)
CREAT UR-MCNC: 246.9 MG/DL
DEPRECATED HCO3 PLAS-SCNC: 20 MMOL/L (ref 22–29)
EOSINOPHIL # BLD AUTO: 0.3 10E3/UL (ref 0–0.7)
EOSINOPHIL NFR BLD AUTO: 3 %
ERYTHROCYTE [DISTWIDTH] IN BLOOD BY AUTOMATED COUNT: 14.1 % (ref 10–15)
GFR SERPL CREATININE-BSD FRML MDRD: >90 ML/MIN/1.73M2
GLUCOSE SERPL-MCNC: 115 MG/DL (ref 70–99)
HBA1C MFR BLD: 6.3 % (ref 0–5.6)
HCT VFR BLD AUTO: 38.7 % (ref 35–47)
HDLC SERPL-MCNC: 28 MG/DL
HGB BLD-MCNC: 12.7 G/DL (ref 11.7–15.7)
IMM GRANULOCYTES # BLD: 0.1 10E3/UL
IMM GRANULOCYTES NFR BLD: 1 %
LDLC SERPL CALC-MCNC: 105 MG/DL
LYMPHOCYTES # BLD AUTO: 3 10E3/UL (ref 0.8–5.3)
LYMPHOCYTES NFR BLD AUTO: 29 %
MCH RBC QN AUTO: 27.4 PG (ref 26.5–33)
MCHC RBC AUTO-ENTMCNC: 32.8 G/DL (ref 31.5–36.5)
MCV RBC AUTO: 84 FL (ref 78–100)
MICROALBUMIN UR-MCNC: <12 MG/L
MICROALBUMIN/CREAT UR: NORMAL MG/G{CREAT}
MONOCYTES # BLD AUTO: 0.6 10E3/UL (ref 0–1.3)
MONOCYTES NFR BLD AUTO: 5 %
NEUTROPHILS # BLD AUTO: 6.4 10E3/UL (ref 1.6–8.3)
NEUTROPHILS NFR BLD AUTO: 62 %
NONHDLC SERPL-MCNC: 158 MG/DL
PLATELET # BLD AUTO: 395 10E3/UL (ref 150–450)
POTASSIUM SERPL-SCNC: 4.3 MMOL/L (ref 3.4–5.3)
PROT SERPL-MCNC: 8 G/DL (ref 6.4–8.3)
RBC # BLD AUTO: 4.63 10E6/UL (ref 3.8–5.2)
SODIUM SERPL-SCNC: 140 MMOL/L (ref 136–145)
TRIGL SERPL-MCNC: 263 MG/DL
WBC # BLD AUTO: 10.4 10E3/UL (ref 4–11)

## 2023-07-11 PROCEDURE — 96127 BRIEF EMOTIONAL/BEHAV ASSMT: CPT | Performed by: FAMILY MEDICINE

## 2023-07-11 PROCEDURE — 83036 HEMOGLOBIN GLYCOSYLATED A1C: CPT

## 2023-07-11 PROCEDURE — 85025 COMPLETE CBC W/AUTO DIFF WBC: CPT | Performed by: FAMILY MEDICINE

## 2023-07-11 PROCEDURE — 80061 LIPID PANEL: CPT

## 2023-07-11 PROCEDURE — 99214 OFFICE O/P EST MOD 30 MIN: CPT | Performed by: FAMILY MEDICINE

## 2023-07-11 PROCEDURE — 80053 COMPREHEN METABOLIC PANEL: CPT

## 2023-07-11 PROCEDURE — 82043 UR ALBUMIN QUANTITATIVE: CPT | Performed by: FAMILY MEDICINE

## 2023-07-11 PROCEDURE — 36415 COLL VENOUS BLD VENIPUNCTURE: CPT

## 2023-07-11 PROCEDURE — 82570 ASSAY OF URINE CREATININE: CPT | Performed by: FAMILY MEDICINE

## 2023-07-11 RX ORDER — CIPROFLOXACIN 500 MG/1
500 TABLET, FILM COATED ORAL 2 TIMES DAILY
Qty: 6 TABLET | Refills: 0 | Status: SHIPPED | OUTPATIENT
Start: 2023-07-11 | End: 2023-07-14

## 2023-07-11 ASSESSMENT — PATIENT HEALTH QUESTIONNAIRE - PHQ9
SUM OF ALL RESPONSES TO PHQ QUESTIONS 1-9: 20
10. IF YOU CHECKED OFF ANY PROBLEMS, HOW DIFFICULT HAVE THESE PROBLEMS MADE IT FOR YOU TO DO YOUR WORK, TAKE CARE OF THINGS AT HOME, OR GET ALONG WITH OTHER PEOPLE: VERY DIFFICULT
SUM OF ALL RESPONSES TO PHQ QUESTIONS 1-9: 20

## 2023-07-11 ASSESSMENT — PAIN SCALES - GENERAL: PAINLEVEL: SEVERE PAIN (7)

## 2023-07-11 ASSESSMENT — ASTHMA QUESTIONNAIRES: ACT_TOTALSCORE: 22

## 2023-07-11 NOTE — TELEPHONE ENCOUNTER
Central Prior Authorization Team - Phone: [phone]     Prior Authorization Approval- as previously noted insurance has last name still under  'Yu'    Medication: VENLAFAXINE HCL  MG PO TB24  Authorization Effective Date: 4/12/2023  Authorization Expiration Date: 7/11/2024  Approved Dose/Quantity: 180  Reference #:     Insurance Company: [employer] - Phone [phone] Fax [phone]  Expected CoPay:       CoPay Card Available:      Financial Assistance Needed:   Which Pharmacy is filling the prescription: [facility] [place], [place] - [address]  Pharmacy Notified: Yes  Patient Notified: Yes pharmacy will notify when ready

## 2023-07-11 NOTE — PROGRESS NOTES
"  Assessment & Plan     Acute infectious diarrhea  >6 days.  Enterotoxic e coli found at outside hospital.   Given her immunosuppressed status and severity/length of symptoms, will treat with cipro    - ciprofloxacin (CIPRO) 500 MG tablet; Take 1 tablet (500 mg) by mouth 2 times daily for 3 days  - CBC with platelets and differential; Future    Diabetes mellitus, iatrogenic (H)  Stable, off metformin from hospital stay  Will go back to it when stools return to normal.  Start slow with 1 twice daily and then increase back to 1000 mg twice daily   - Albumin Random Urine Quantitative with Creat Ratio  - Comprehensive metabolic panel; Future  - Lipid panel reflex to direct LDL Fasting; Future  - Hemoglobin A1c; Future  - metFORMIN (GLUCOPHAGE) 500 MG tablet; TAKE 2 TABLETS BY MOUTH 2 TIMES DAILY WITH MEALS    Current episode of major depressive disorder without prior episode, unspecified depression episode severity   continue effexor    Parent-child conflict   transgendered child lives with her and creates a lot of conflict.  Not using good hygiene, not contributing to household, is verbally abusive to her.  She has asked them to leave and they won't.   Needs help with boundary setting               BMI:   Estimated body mass index is 36.14 kg/m  as calculated from the following:    Height as of this encounter: 1.6 m (5' 3\").    Weight as of this encounter: 92.5 kg (204 lb).       Depression Screening Follow Up        7/11/2023    11:24 AM   PHQ   PHQ-9 Total Score 20   Q9: Thoughts of better off dead/self-harm past 2 weeks Several days   F/U: Thoughts of suicide or self-harm No   F/U: Safety concerns No                 Follow Up    Follow Up Actions Taken      Discussed the following ways the patient can remain in a safe environment:        Anna Naidu MD  [facility]   Doretha is a 48 year old, presenting for the following health issues:  Abdominal Pain      History of " Present Illness       Reason for visit:  Ecoli and viral infection    She eats 2-3 servings of fruits and vegetables daily.She consumes 0 sweetened beverage(s) daily.She exercises with enough effort to increase her heart rate 10 to 19 minutes per day.  She exercises with enough effort to increase her heart rate 3 or less days per week.   She is taking medications regularly.    Today's PHQ-9         PHQ-9 Total Score: 20    PHQ-9 Q9 Thoughts of better off dead/self-harm past 2 weeks :   Several days  Thoughts of suicide or self harm: (P) No  Self-harm Plan:     Self-harm Action:       Safety concerns for self or others: (P) No    How difficult have these problems made it for you to do your work, take care of things at home, or get along with other people: Very difficult         Hospital Follow-up Visit:    Hospital/Nursing Home/ Rehab Facility: [facility]  Date of Admission: 7/5/2023  Date of Discharge: 7/8/2023  Reason(s) for Admission: Abdominal pain, bug bite    Was your hospitalization related to COVID-19? No   Problems taking medications regularly:  None  Medication changes since discharge: Metformin was discontinued  Problems adhering to non-medication therapy:  None    Summary of hospitalization:  CareEverywhere information obtained and reviewed  Diagnostic Tests/Treatments reviewed.  Follow up needed: none  Other Healthcare Providers Involved in Patient s Care:         None  Update since discharge: She notes that she is not sleeping well, burning around stoma, fatigue and sweating. She is not checking blood sugars at home. She does have a productive cough, ear pain, diarrhea, abdominal pain, chills, headaches and throatt irritation. She has had low grade tempeture's.  She notes that she needs new CPAP supplies now that she has lost weight    Plan of care communicated with patient     Hospital Course:   Doretha Yu is a 48-year-old female visiting from [place] with history of  "ulcerative pancolitis s/p partial colectomy, end ileostomy right upper quadrant 6/21, history of parastomal hernia who presented to the ED with complaints of pain around her stoma.  admitted for pain monitoring/management. CT scan demonstrated no acute findings, known parastomal hernia. After admission she was noted to have copious malka liquid output of her ostomy, so she had a GI pathogen panel obtained which was positive for enterotoxic E. coli. She did not require antibiotic therapy for such. She was treated symptomatically for her abdominal discomfort, and for most 2 days prior to discharge was tolerating mainly oral medications for pain control although she did require a few doses of IM Toradol after her IV infiltrated. On day of discharge she is still complaining of significant amount of abdominal discomfort diffusely as well as some discomfort around her stoma site but is ambulating well and has been eating solid food for a couple of days now. She is requesting discharge home and feels comfortable driving herself back to [place]. She is encouraged to take frequent breaks and to call for any concerns. She is also instructed to call her PCP clinic next business day to make a follow-up appointment for next week. We did discuss that her CRP continues to be moderately elevated at time of discharge which likely is combination of her current enteritis as well as what appears to be a viral URI that she has been developing. Her daughter has currently been hospitalized during this time with adenovirus and reportedly a bacterial pneumonia as well.           Review of Systems   Constitutional, HEENT, cardiovascular, pulmonary, gi and gu systems are negative, except as otherwise noted.      Objective    /70   Pulse 85   Temp 97.9  F (36.6  C) (Tympanic)   Resp 20   Ht 1.6 m (5' 3\")   Wt 92.5 kg (204 lb)   LMP  (LMP Unknown)   SpO2 97%   BMI 36.14 kg/m    Body mass index is 36.14 kg/m .  Physical Exam "   GENERAL: healthy, alert and no distress  NECK: no adenopathy, no asymmetry, masses, or scars and thyroid normal to palpation  RESP: lungs clear to auscultation - no rales, rhonchi or wheezes  CV: regular rate and rhythm, normal S1 S2, no S3 or S4, no murmur, click or rub, no peripheral edema and peripheral pulses strong  ABDOMEN: tenderness throughout and hypoactive bowel sounds but present.  Watery output of stoma  MS: no gross musculoskeletal defects noted, no edema  PSYCH: mentation appears normal, tearful, anxious, judgement and insight intact and appearance well groomed    Results for orders placed or performed in visit on 07/11/23   Hemoglobin A1c     Status: Abnormal   Result Value Ref Range    Hemoglobin A1C 6.3 (H) 0.0 - 5.6 %

## 2023-07-12 ENCOUNTER — MYC MEDICAL ADVICE (OUTPATIENT)
Dept: FAMILY MEDICINE | Facility: CLINIC | Age: 48
End: 2023-07-12
Payer: COMMERCIAL

## 2023-07-18 ENCOUNTER — TELEPHONE (OUTPATIENT)
Dept: FAMILY MEDICINE | Facility: CLINIC | Age: 48
End: 2023-07-18
Payer: COMMERCIAL

## 2023-07-18 NOTE — TELEPHONE ENCOUNTER
Called patient she reports she is contacting GI to start with and she will let Dr. Naidu's team know if she needs follow up.     Patient asked about starting medication for Cholesterol and would be will to take a medication but wants to first start out on a low dosage to make sure she tolerates the medication first.     RN provided education about the risks of increased LDL with diabetes, diet and exercise to also help with LDL. Patient reports she needs the medication prescribed as an oral suspension.     Patient would like prescription to go to [facility].     Routing to Dr. Naidu to evaluate.    Rosemary Borden RN on 7/18/2023 at 5:45 PM

## 2023-07-18 NOTE — TELEPHONE ENCOUNTER
Symptoms    Describe your symptoms: Pt was inpatient last week with E-Coli and yesterday pt started to not feel good again.  Pt's dumping info her ileostmy more often and it's all liquid.  Her ileostomy burns.  No fever.  Pt wants to know if she should see Dr. Naidu or call GI?  Please call patient and advise.      Any pain: Yes:     How long have you been having symptoms: 2  days    Have you been seen for this:  Yes:     Appointment offered?: No    Triage offered?: Yes:     Home remedies tried:     Preferred Pharmacy:   [facility] [place], [place] - [address]  [address]  [address] [facility] [address]  Phone: [phone] Fax: [phone] Alternate Fax: [phone]    Could we send this information to you in [place] or would you prefer to receive a phone call?:   Patient would prefer a phone call   Okay to leave a detailed message?: Yes at Cell number on file:    Telephone Information:   Mobile [phone]

## 2023-07-19 NOTE — TELEPHONE ENCOUNTER
I need clarification on why the cholesterol medication needs to be a suspension?  She takes other medications orally, correctly?  If this is truly necessary, please check with pharmacy on what is available as a liquid.      Anna Naidu M.D.

## 2023-07-19 NOTE — TELEPHONE ENCOUNTER
Patient Contact    Attempt # 1    Was call answered?  No.  Left message on voicemail with information to call back.      When patient calls back need to know why she needs medication as a suspension.     Britany Keenan RN on 7/19/2023 at 12:10 PM

## 2023-07-20 ENCOUNTER — TELEPHONE (OUTPATIENT)
Dept: FAMILY MEDICINE | Facility: CLINIC | Age: 48
End: 2023-07-20
Payer: COMMERCIAL

## 2023-07-20 DIAGNOSIS — F32.9 CURRENT EPISODE OF MAJOR DEPRESSIVE DISORDER WITHOUT PRIOR EPISODE, UNSPECIFIED DEPRESSION EPISODE SEVERITY: Primary | ICD-10-CM

## 2023-07-20 RX ORDER — VENLAFAXINE 50 MG/1
50 TABLET ORAL 2 TIMES DAILY
Qty: 180 TABLET | Refills: 1 | Status: SHIPPED | OUTPATIENT
Start: 2023-07-20 | End: 2023-10-31

## 2023-07-20 RX ORDER — VENLAFAXINE 100 MG/1
100 TABLET ORAL 2 TIMES DAILY
Qty: 180 TABLET | Refills: 1 | Status: SHIPPED | OUTPATIENT
Start: 2023-07-20 | End: 2023-10-03

## 2023-07-20 NOTE — TELEPHONE ENCOUNTER
Patient Contact    Attempt # 2    Was call answered?  No.  Left message on voicemail with information to call  back.    Britany Keenan RN on 7/20/2023 at 11:13 AM

## 2023-07-20 NOTE — TELEPHONE ENCOUNTER
Patient would like the 75mg tablets ( 2 tabs twice a day)      Per insurance, Maximum daily dose of 3 tabs.    Prior Authorization Retail Medication Request    Medication/Dose: Venlafaxine 75mg tablet  ICD code (if different than what is on RX):  na  Previously Tried and Failed:  na  Rationale:  na    Insurance Name:  gilda white  Insurance ID:  [id number]        Pharmacy Information (if different than what is on RX)  Name:   [facility]  Phone:  [phone]

## 2023-07-20 NOTE — TELEPHONE ENCOUNTER
Central Prior Authorization Team - Phone: [phone]     PA Initiation    Medication: VENLAFAXINE HCL 75 MG PO TABS  Insurance Company: [employer] - Phone [phone] Fax [phone]  Pharmacy Filling the Rx: [place], [place] - [address]  Filling Pharmacy Phone: [phone]  Filling Pharmacy Fax:    Start Date: 7/20/2023

## 2023-07-21 NOTE — TELEPHONE ENCOUNTER
Patient Contact    Attempt # 3    Was call answered?  No.  Left message on voicemail with information to call  back.    Britany Keenan RN on 7/21/2023 at 10:05 AM

## 2023-07-21 NOTE — TELEPHONE ENCOUNTER
Central Prior Authorization Team - Phone: [phone]     Prior Authorization Approval    Medication: VENLAFAXINE HCL 75 MG PO TABS  Authorization Effective Date: 4/21/2023  Authorization Expiration Date: 7/20/2024  Approved Dose/Quantity: 120  Reference #:     Insurance Company: [employer] - Phone [phone] Fax [phone]  Expected CoPay:       CoPay Card Available:      Financial Assistance Needed:   Which Pharmacy is filling the prescription: [facility] [place], [place] - [address]  Pharmacy Notified: Yes  Patient Notified: Yes pharmacy will notify when ready

## 2023-08-16 NOTE — TELEPHONE ENCOUNTER
FUTURE VISIT INFORMATION        SURGERY INFORMATION:  Date: 23  Location: uu or  Surgeon:  Quinton Ram MD  Anesthesia Type:  general  Procedure: COMPLETION PROCTECTOMY, ROBOT-ASSISTED, ILEAL POUCH ANASTAMOSIS     RECORDS REQUESTED FROM:         Primary Care Provider: Anna Naidu MD- [facility]     Pertinent Medical History: STORMY     Most recent EKG+ Tracin23     Most recent ECHO: 21

## 2023-08-21 LAB
ABO/RH(D): NORMAL
ANTIBODY SCREEN: NEGATIVE
SPECIMEN EXPIRATION DATE: NORMAL

## 2023-08-22 ENCOUNTER — LAB (OUTPATIENT)
Dept: LAB | Facility: CLINIC | Age: 48
End: 2023-08-22
Attending: SURGERY
Payer: COMMERCIAL

## 2023-08-22 ENCOUNTER — OFFICE VISIT (OUTPATIENT)
Dept: SURGERY | Facility: CLINIC | Age: 48
End: 2023-08-22
Payer: COMMERCIAL

## 2023-08-22 ENCOUNTER — ANESTHESIA EVENT (OUTPATIENT)
Dept: SURGERY | Facility: CLINIC | Age: 48
DRG: 330 | End: 2023-08-22
Payer: COMMERCIAL

## 2023-08-22 ENCOUNTER — PRE VISIT (OUTPATIENT)
Dept: SURGERY | Facility: CLINIC | Age: 48
End: 2023-08-22

## 2023-08-22 VITALS
WEIGHT: 206 LBS | BODY MASS INDEX: 36.5 KG/M2 | OXYGEN SATURATION: 98 % | HEART RATE: 75 BPM | DIASTOLIC BLOOD PRESSURE: 71 MMHG | TEMPERATURE: 98 F | HEIGHT: 63 IN | RESPIRATION RATE: 16 BRPM | SYSTOLIC BLOOD PRESSURE: 103 MMHG

## 2023-08-22 DIAGNOSIS — K51.00 ULCERATIVE PANCOLITIS (H): ICD-10-CM

## 2023-08-22 DIAGNOSIS — Z01.818 PREOP EXAMINATION: ICD-10-CM

## 2023-08-22 DIAGNOSIS — K51.018 ULCERATIVE PANCOLITIS WITH OTHER COMPLICATION (H): ICD-10-CM

## 2023-08-22 DIAGNOSIS — Z01.818 PREOP EXAMINATION: Primary | ICD-10-CM

## 2023-08-22 LAB
ALBUMIN SERPL BCG-MCNC: 4.4 G/DL (ref 3.5–5.2)
ALP SERPL-CCNC: 101 U/L (ref 35–104)
ALT SERPL W P-5'-P-CCNC: 28 U/L (ref 0–50)
ANION GAP SERPL CALCULATED.3IONS-SCNC: 10 MMOL/L (ref 7–15)
AST SERPL W P-5'-P-CCNC: 21 U/L (ref 0–45)
BASOPHILS # BLD AUTO: 0.1 10E3/UL (ref 0–0.2)
BASOPHILS NFR BLD AUTO: 1 %
BILIRUB SERPL-MCNC: 0.2 MG/DL
BUN SERPL-MCNC: 12.9 MG/DL (ref 6–20)
CALCIUM SERPL-MCNC: 10.2 MG/DL (ref 8.6–10)
CHLORIDE SERPL-SCNC: 104 MMOL/L (ref 98–107)
CREAT SERPL-MCNC: 0.77 MG/DL (ref 0.51–0.95)
DEPRECATED HCO3 PLAS-SCNC: 24 MMOL/L (ref 22–29)
EOSINOPHIL # BLD AUTO: 0.3 10E3/UL (ref 0–0.7)
EOSINOPHIL NFR BLD AUTO: 4 %
ERYTHROCYTE [DISTWIDTH] IN BLOOD BY AUTOMATED COUNT: 14.5 % (ref 10–15)
GFR SERPL CREATININE-BSD FRML MDRD: >90 ML/MIN/1.73M2
GLUCOSE SERPL-MCNC: 92 MG/DL (ref 70–99)
HCT VFR BLD AUTO: 37.5 % (ref 35–47)
HGB BLD-MCNC: 12.2 G/DL (ref 11.7–15.7)
IMM GRANULOCYTES # BLD: 0 10E3/UL
IMM GRANULOCYTES NFR BLD: 0 %
LYMPHOCYTES # BLD AUTO: 2.2 10E3/UL (ref 0.8–5.3)
LYMPHOCYTES NFR BLD AUTO: 23 %
MCH RBC QN AUTO: 27.6 PG (ref 26.5–33)
MCHC RBC AUTO-ENTMCNC: 32.5 G/DL (ref 31.5–36.5)
MCV RBC AUTO: 85 FL (ref 78–100)
MONOCYTES # BLD AUTO: 0.5 10E3/UL (ref 0–1.3)
MONOCYTES NFR BLD AUTO: 6 %
NEUTROPHILS # BLD AUTO: 6.4 10E3/UL (ref 1.6–8.3)
NEUTROPHILS NFR BLD AUTO: 66 %
NRBC # BLD AUTO: 0 10E3/UL
NRBC BLD AUTO-RTO: 0 /100
PLATELET # BLD AUTO: 409 10E3/UL (ref 150–450)
POTASSIUM SERPL-SCNC: 4.1 MMOL/L (ref 3.4–5.3)
PREALB SERPL IA-MCNC: 20 MG/DL (ref 15–45)
PROT SERPL-MCNC: 7.5 G/DL (ref 6.4–8.3)
RBC # BLD AUTO: 4.42 10E6/UL (ref 3.8–5.2)
SODIUM SERPL-SCNC: 138 MMOL/L (ref 136–145)
WBC # BLD AUTO: 9.5 10E3/UL (ref 4–11)

## 2023-08-22 PROCEDURE — 85025 COMPLETE CBC W/AUTO DIFF WBC: CPT | Performed by: PATHOLOGY

## 2023-08-22 PROCEDURE — 99000 SPECIMEN HANDLING OFFICE-LAB: CPT | Performed by: PATHOLOGY

## 2023-08-22 PROCEDURE — 84134 ASSAY OF PREALBUMIN: CPT | Performed by: SURGERY

## 2023-08-22 PROCEDURE — 80053 COMPREHEN METABOLIC PANEL: CPT | Performed by: PATHOLOGY

## 2023-08-22 PROCEDURE — 99204 OFFICE O/P NEW MOD 45 MIN: CPT | Performed by: CLINICAL NURSE SPECIALIST

## 2023-08-22 PROCEDURE — 36415 COLL VENOUS BLD VENIPUNCTURE: CPT | Performed by: PATHOLOGY

## 2023-08-22 PROCEDURE — 86901 BLOOD TYPING SEROLOGIC RH(D): CPT | Performed by: CLINICAL NURSE SPECIALIST

## 2023-08-22 ASSESSMENT — LIFESTYLE VARIABLES: TOBACCO_USE: 1

## 2023-08-22 ASSESSMENT — PAIN SCALES - GENERAL: PAINLEVEL: SEVERE PAIN (6)

## 2023-08-22 ASSESSMENT — ENCOUNTER SYMPTOMS: DYSRHYTHMIAS: 0

## 2023-08-22 NOTE — PATIENT INSTRUCTIONS
Preparing for Your Surgery      Name:  Doretha Fernandez   MRN:  [id number]   :  1975   Today's Date:  2023       Arriving for surgery:  Surgery date:  23  Arrival time:  06:30 am      Please come to:         [facility] [address]  [address] [place]  [address]      The [facility] Patient /Visitor Ramp is located at [address]. Patients and visitors who self-park will receive the reduced hospital parking rate. If the Patient /Visitor Ramp is full, please follow the signs to the [employer] parking located at the main hospital entrance.     parking is available ( 24 hours/ 7 days a week)    Discounted parking pass options are available for patients and visitors. They can be purchased at the [employer] desk at the main hospital entrance.    -    Stop at the security desk and they will direct surgery patients to the 3rd floor Surgery Waiting Room. [phone]     -  If you are in need of directions, wheelchair or escort please stop at the Information/security desk in the lobby.       What can I eat or drink?  -  You may eat per surgeon's instructions.  -  You may have clear liquids until 2 hours prior to arrival time.     Examples of clear liquids:  Water  Clear broth  Juices (apple, white grape, white cranberry  and cider) without pulp  Noncarbonated, powder based beverages  (lemonade and Bg-Aid)  Sodas (Sprite, 7-Up, ginger ale and seltzer)  Coffee or tea (without milk or cream)  Gatorade    -  No Alcohol or cannabis products for at least 24 hours before surgery.     Which medicines can I take?    Hold Aspirin for 7 days before surgery.   Hold Multivitamins for 7 days before surgery.  Hold Supplements for 7 days before surgery.  Hold Ibuprofen (Advil, Motrin) for 1 day(s) before surgery--unless otherwise directed by surgeon.  Hold Naproxen (Aleve) for 4 days before surgery.  Hold phentermine x 7 days before  surgery.  -  DO NOT take these medications the day of surgery:  TUMS, metformin    -  PLEASE TAKE these medications the day of surgery:  Tylenol if needed; take other morning medications.  Bring inhalers if currently using.    How do I prepare myself?  - Please take 2 showers (one the night prior to surgery and one the morning of surgery) using Scrubcare or Hibiclens soap.    Use this soap only from the neck to your toes.     Leave the soap on your skin for one minute--then rinse thoroughly.      You may use your own shampoo and conditioner. No other hair products.   - Please remove all jewelry and body piercings.  - No lotions, deodorants or fragrance.  - No makeup or fingernail polish.   - Bring your ID and insurance card.    -If you have a Deep Brain Stimulator, Spinal Cord Stimulator, or any Neuro Stimulator device---you must bring the remote control to the hospital.      ALL PATIENTS GOING HOME THE SAME DAY OF SURGERY ARE REQUIRED TO HAVE A RESPONSIBLE ADULT TO DRIVE AND BE IN ATTENDANCE WITH THEM FOR 24 HOURS FOLLOWING SURGERY.    Covid testing policy as of 12/06/2022  Your surgeon will notify and schedule you for a COVID test if one is needed before surgery--please direct any questions or COVID symptoms to your surgeon      Questions or Concerns:    - For any questions regarding the day of surgery or your hospital stay, please contact the Pre Admission Nursing Office at [phone].       - If you have health changes between today and your surgery, please call your surgeon.       - For questions after surgery, please call your surgeons office.           Current Visitor Guidelines    You may have 2 visitors in the pre op area.    Visiting hours: 8 a.m. to 8:30 p.m.    You may have four visitors during your inpatient hospital stay.    Patients confirmed or suspected to have symptoms of COVID 19 or flu:     No visitors allowed for adult patients.   Children (under age 18) can have 1 named visitor.     People  who are sick or showing symptoms of COVID 19 or flu:    Are not allowed to visit patients--we can only make exceptions in special situations.       Please follow these guidelines for your visit:          Please maintain social distance          Masking is optional--however at times you may be asked to wear a mask for the safety of yourself and others     Clean your hands with alcohol hand . Do this when you arrive at and leave the building and patient room,    And again after you touch your mask or anything in the room.     Go directly to and from the room you are visiting.     Stay in the patient s room during your visit. Limit going to other places in the hospital as much as possible     Leave bags and jackets at home or in the car.     For everyone s health, please don t come and go during your visit. That includes for smoking   during your visit.

## 2023-08-22 NOTE — H&P
Pre-Operative H & P     CC:  Preoperative exam to assess for increased cardiopulmonary risk while undergoing surgery and anesthesia.    Date of Encounter: 8/22/2023  Primary Care Physician:  Anna Naidu     Reason for visit:   Encounter Diagnoses   Name Primary?    Preop examination Yes    Ulcerative pancolitis (H)        HPI  Doretha Fernandez is a 48 year old female who presents for pre-operative H & P in preparation for  Procedure Information       Case: [id number] Date/Time: 09/05/23 0830    Procedure: COMPLETION PROCTECTOMY, ROBOT-ASSISTED, ILEAL POUCH ANASTAMOSIS (Pelvis)    Anesthesia type: General    Diagnosis: Ulcerative pancolitis with other complication (H) [K51.018]    Pre-op diagnosis: Ulcerative pancolitis with other complication (H) [K51.018]    Location:  OR  /  OR    Providers: Quinton Ram MD          History is obtained from the patient and chart review    Patient with complex medical history to include basal cell carcinoma of eyelid, malignant melanoma status post left lymph node dissection in 2017, 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      She has been recently followed by Dr. Ram for ulcerative colitis and is s/p TAC with end ileostomy (laparoscopic) on 6/15/21. In follow up a parastomal hernia was noted. At last follow up, Dr. Ram encouraged patient to lose weight and titrate off steroids as it would be difficult to create a pouch with her on steroids. Since that time she has lost 60 pounds, is no longer on steroids and is preparing for above procedure.     Hx of abnormal bleeding or anti-platelet use: Denies    Menstrual history: Patient's last menstrual period was 08/01/2023 (approximate).     Past Medical  History  Past Medical History:   Diagnosis Date    Abnormal MRI     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     Anxiety     Basal cell carcinoma     Cervical high risk HPV (human papillomavirus) test positive 2019    See problem list    Colitis     Depression     Diabetes mellitus, iatrogenic (H) 2020    Esophageal reflux     Inflammatory arthritis     Insomnia     Intestinal giardiasis 2018    Lumbago     left lower back pain    Lymphedema     Malignant melanoma (H)     Melanoma (H) 10/23/2017    Migraines     Mild persistent asthma     Morbid obesity with BMI of 40.0-44.9, adult (H)     STORMY (obstructive sleep apnea)     Prothrombin deficiency (H)     takes 81mg asa daily    Stroke (cerebrum) (H)     During     TIA (transient ischemic attack)     Type 2 diabetes mellitus (H)     Ulcerative pancolitis (H)        Past Surgical History  Past Surgical History:   Procedure Laterality Date    APPENDECTOMY      COLONOSCOPY N/A 10/18/2017    Procedure: COLONOSCOPY;  Colon;  Surgeon: Debbie Stephens MD;  [facility]: [facility]    COLONOSCOPY N/A 2018    Procedure: COMBINED COLONOSCOPY, SINGLE OR MULTIPLE BIOPSY/POLYPECTOMY BY BIOPSY;  colon;  Surgeon: Benita Schumacher MD;  Location: [facility]    COLONOSCOPY      multiple since  to present - about 6 total    DISSECT LYMPH NODE AXILLA Left 10/23/2017    Procedure: DISSECT LYMPH NODE AXILLA;  Left Axillary Lymph Node Dissection ;  Surgeon: Laurent Cool MD;  Location: [facility] OR    EXAM UNDER ANESTHESIA PELVIC N/A 2020    Procedure: EXAM UNDER ANESTHESIA, PELVIS; with Cervical Biopsies, Vaginal Biopsy and Endocervical Curettings;  Surgeon: Melina Jung MD;  Location: [facility] OR    GYN SURGERY  ,         LAPAROSCOPIC ASSISTED COLECTOMY N/A 06/15/2021    Procedure: laparoscopic total abdominal colectomy, end ileostomy;  Surgeon: Quinton Ram MD;  Location: [facility] OR    REPAIR MOHS Left 2017     Procedure: REPAIR MOHS;  Left Upper Lid Moh's Reconstruction;  Surgeon: Kisha Bosch MD;  Location: [facility] OR       Prior to Admission Medications  Current Outpatient Medications   Medication Sig Dispense Refill    acetaminophen (TYLENOL) 325 MG tablet Take 3 tablets (975 mg) by mouth every 8 hours As scheduled for the next 7-10 days, then decrease both dose & frequency to as needed there after. 90 tablet 0    calcium carbonate (TUMS) 500 MG chewable tablet Take 1 tablet (500 mg) by mouth 3 times daily as needed for heartburn (Patient taking differently: Take 1 chew tab by mouth as needed for heartburn) 30 tablet 0    CANNABIDIOL PO Take 2 capsules by mouth      medical cannabis (Patient's own supply) See Admin Instructions (The purpose of this order is to document that the patient reports taking medical cannabis.  This is not a prescription, and is not used to certify that the patient has a qualifying medical condition.)      metFORMIN (GLUCOPHAGE) 500 MG tablet TAKE 2 TABLETS BY MOUTH 2 TIMES DAILY WITH MEALS (Patient taking differently: Take 500 mg by mouth 2 times daily (with meals) TAKE 2 TABLETS BY MOUTH 2 TIMES DAILY WITH MEALS) 360 tablet 1    ondansetron (ZOFRAN ODT) 4 MG ODT tab Take 1 tablet (4 mg) by mouth every 8 hours as needed for nausea or vomiting (Patient taking differently: Take 4 mg by mouth as needed for nausea or vomiting) 10 tablet 1    phentermine (ADIPEX-P) 15 MG capsule Take 15 mg by mouth every morning      venlafaxine (EFFEXOR) 100 MG tablet Take 1 tablet (100 mg) by mouth 2 times daily Total of 150 mg bid 180 tablet 1    venlafaxine (EFFEXOR) 50 MG tablet Take 1 tablet (50 mg) by mouth 2 times daily Total of 150 mg twice daily 180 tablet 1    albuterol (PROAIR HFA/PROVENTIL HFA/VENTOLIN HFA) 108 (90 Base) MCG/ACT inhaler Inhale 2 puffs into the lungs every 4 hours as needed for shortness of breath / dyspnea (Patient not taking: Reported on 8/22/2023) 18 g 1    Calcium  Carb-Cholecalciferol 600-800 MG-UNIT TABS Take 800 mg by mouth every morning (before breakfast) 90 tablet 3    Cholecalciferol (VITAMIN D3) 25 MCG TABS TAKE THREE TABLETS BY MOUTH ONCE DAILY 300 tablet 1    cyanocobalamin (VITAMIN B-12) 1000 MCG tablet Take 1 tablet (1,000 mcg) by mouth daily      diclofenac (VOLTAREN) 1 % topical gel Apply 4 g topically 4 times daily (Patient not taking: Reported on 2023) 150 g 0    loperamide (IMODIUM A-D) 2 MG tablet Take 2 tabs daily and increase as needed until output is apple sauce consistency. Max of 8 tabs (16 mg) per day. (Patient not taking: Reported on 2023) 240 tablet 3    Ostomy Supplies MISC 20 each daily 20 each 11       Allergies  Allergies   Allergen Reactions    Bee Venom Swelling    Azithromycin Diarrhea    Erythromycin      Other reaction(s): GI intolerance, Vomiting    Fentanyl Other (See Comments)     sweating  sweating    Prochlorperazine Fatigue     Other reaction(s): Other (see comments)  Fatigue    Buspirone      Other reaction(s): GI intolerance  vomiting    Erythrocin Nausea and Vomiting    Topamax [Topiramate]      Made her lethargic    Zithromax [Azithromycin Dihydrate] Diarrhea    Enbrel [Etanercept] Hives and Rash       Social History  Social History     Socioeconomic History    Marital status:      Spouse name: Not on file    Number of children: Not on file    Years of education: Not on file    Highest education level: Not on file   Occupational History    Not on file   Tobacco Use    Smoking status: Former     Packs/day: 1.00     Years: 5.00     Pack years: 5.00     Types: Cigarettes     Quit date: 3/20/1998     Years since quittin.4    Smokeless tobacco: Never   Vaping Use    Vaping Use: Never used   Substance and Sexual Activity    Alcohol use: Not Currently    Drug use: Yes     Types: Marijuana     Comment: vape, edible    Sexual activity: Not Currently     Partners: Male     Birth control/protection: Surgical   Other  "Topics Concern    Parent/sibling w/ CABG, MI or angioplasty before 65F 55M? No   Social History Narrative    20 y.o- patient's mother   of lung cancer. She had to take care of her younger sister.    2012- patient's  had a heart attack with stents placed, followed by cardiac rehabilitation    2000 TO 2012  was in [facility] for depression    2013 patient's  went through alcohol rehabilitation at [facility] inpatient            They have attended couple counseling a couple of times and patient went to the family program for chemical dependency.    Patient denies alcohol or drug use and herself            Has 2 children, girls ages 17 and 14. Oldest has been a \"trouble maker.\"     For a while she was working 3 jobs since her  was ill. 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. Reports her job is very stressful.         Social Determinants of Health     Financial Resource Strain: High Risk (2021)    Overall Financial Resource Strain (CARDIA)     Difficulty of Paying Living Expenses: Very hard   Food Insecurity: No Food Insecurity (2021)    Hunger Vital Sign     Worried About Running Out of Food in the Last Year: Never true     Ran Out of Food in the Last Year: Never true   Transportation Needs: No Transportation Needs (2021)    PRAPARE - Transportation     Lack of Transportation (Medical): No     Lack of Transportation (Non-Medical): No   Physical Activity: Not on file   Stress: Stress Concern Present (2021)    [employer] - Occupational Stress Questionnaire     Feeling of Stress : Very much   Social Connections: Unknown (2021)    Social Connection and Isolation Panel [NHANES]     Frequency of Communication with Friends and Family: Not asked     Frequency of Social Gatherings with Friends and Family: Not asked     Attends Sikh Services: Not asked     Active Member of " Clubs or Organizations: Not asked     Attends Club or Organization Meetings: Not asked     Marital Status:    Intimate Partner Violence: Unknown (6/25/2021)    Humiliation, Afraid, Rape, and Kick questionnaire     Fear of Current or Ex-Partner: Not asked     Emotionally Abused: Not asked     Physically Abused: Not asked     Sexually Abused: Not asked   Housing Stability: Not on file       Family History  Family History   Problem Relation Age of Onset    Cancer Mother 45        lung    Neurologic Disorder Mother         epilepsy    Lipids Father     Gastrointestinal Disease Father         diverticulitis     Depression Father     Colitis Father     Colon Cancer Father     Diverticulitis Father     Depression Sister     Cancer Maternal Grandmother     Blood Disease Maternal Grandmother         lymphoma     Arthritis Maternal Grandmother     Diabetes Maternal Grandmother     Depression Maternal Grandmother     Macular Degeneration Maternal Grandmother     Glaucoma Maternal Grandmother     Diabetes Maternal Grandfather     Cerebrovascular Disease Maternal Grandfather     Blood Disease Maternal Grandfather     Heart Disease Maternal Grandfather     Glaucoma Maternal Grandfather     Cancer Paternal Grandmother     Cancer - colorectal Paternal Grandmother     Colitis Paternal Grandmother     Colon Cancer Paternal Grandmother     Diverticulitis Paternal Grandmother     Respiratory Paternal Grandfather         emphysema     Colitis Paternal Grandfather     Colon Cancer Paternal Grandfather     Diverticulitis Paternal Grandfather     Heart Disease Daughter     Asthma Daughter     Melanoma No family hx of     Anesthesia Reaction No family hx of     Clotting Disorder No family hx of        Review of Systems  The complete review of systems is negative other than noted in the HPI or here.   Anesthesia Evaluation   Pt has had prior anesthetic. Type: General and MAC.    History of anesthetic complications   STORMY.    ROS/MED  HX  ENT/Pulmonary: Comment: Patient has been diagnosed with STORMY, but does not use CPAP. Reported had been told while hospitalized that her 02 sat dropped when sleeping and that she should be using her CPAP. Has lost 60# since November. Will be having updated study    (+) sleep apnea, doesn't use CPAP,              tobacco use, Past use,   Intermittent, asthma Last exacerbation: Rare use of albuterol,  Treatment: Inhaler prn,              (-) recent URI   Neurologic: Comment: Poss fibromyalgia    (+)      migraines,    CVA, date: 2004 during c section, with deficits, - generalized left side weakness.                   Cardiovascular:     (+) Dyslipidemia - -   -  - -                                 Previous cardiac testing   Echo: Date: 4/2021 Results:    Stress Test:  Date: Results:    ECG Reviewed:  Date: 1/11/23 Results:  SR  Cath:  Date: Results:   (-) taking anticoagulants/antiplatelets, ADKINS and arrhythmias   METS/Exercise Tolerance: >4 METS Comment: Walked 5-6 miles at [employer] yesterday. Going to [place] tomorrow   Hematologic: Comments: Prothrombin deficiency    (+)      anemia,       (-) history of blood clots and history of blood transfusion   Musculoskeletal: Comment: Chronic joint pain  (+)  arthritis,             GI/Hepatic: Comment: Ileostomy status    (+) GERD, Asymptomatic on medication,     Inflammatory bowel disease,             Renal/Genitourinary:  - neg Renal ROS     Endo: Comment: Prednisone induced DIABETES MELLITUS on metformin     (+)  type II DM, Last HgA1c: 6.3, date: 7/11/23, Not using insulin, - not using insulin pump. Normal glucose range: does not self monitor,        Obesity,    (-) chronic steroid usage   Psychiatric/Substance Use: Comment: Vaping cannabis or using edibles for pain     (+) psychiatric history anxiety and depression       Infectious Disease:  - neg infectious disease ROS     Malignancy:   (+) Malignancy, History of Skin and Other.Skin CA Remission status post  "Surgery.  Other CA melanoma-no primary found, immunotherapy Remission status post Surgery.    Other:  - neg other ROS    (+)  , H/O Chronic Pain,         /71 (BP Location: Right arm, Patient Position: Sitting, Cuff Size: Adult Large)   Pulse 75   Temp 98  F (36.7  C) (Oral)   Resp 16   Ht 1.6 m (5' 3\")   Wt 93.4 kg (206 lb)   LMP 2023 (Approximate)   SpO2 98%   Breastfeeding No   BMI 36.49 kg/m      Physical Exam   Constitutional: Awake, alert, cooperative, no apparent distress, and appears stated age.  Eyes: Pupils equal, round and reactive to light, extra ocular muscles intact, sclera clear, conjunctiva normal.  HENT: Normocephalic, oral pharynx with moist mucus membranes, good dentition. No goiter appreciated.   Respiratory: Clear to auscultation bilaterally, no crackles or wheezing. No cough or obvious dyspnea.  Cardiovascular: Regular rate and rhythm, normal S1 and S2, and no murmur noted. Carotids +2, no bruits. No edema. Palpable pulses to radial  DP and PT arteries.   GI: Normal bowel sounds, soft, non-distended, nontender. Colostomy right abdomen, covered with appliance, soft brown stool present.    Lymph/Hematologic: No cervical lymphadenopathy and no supraclavicular lymphadenopathy.  Genitourinary:  Deferred.   Skin: Warm and dry.    Musculoskeletal: Full ROM of neck. There is no redness, warmth, or swelling of the joints. Gross motor strength is normal.    Neurologic: Awake, alert, oriented to name, place and time. Cranial nerves II-XII are grossly intact. Gait is normal.   Neuropsychiatric: Calm, cooperative. Normal affect.     Prior Labs/Diagnostic Studies   All labs and imaging personally reviewed     EK23 Sinus rhythm    Echocardiogram 2021  Interpretation Summary  Global and regional left ventricular function is normal with an EF of 55-60%.  Right ventricular function, chamber size, wall motion, and thickness are  normal.  The inferior vena cava is normal.  No " pericardial effusion is present.  There has been no change.    7/11/23 US Abdomen    Impression:   Negative for gallbladder disease.     Hepatic steatosis with mild hepatomegaly.     CT abdomen/pelvis 7/11/23    Impression:   1. Right mid abdominal ileostomy with a fat containing parastomal hernia. No small bowel obstruction.     2. Mild rectal wall thickening may be due to underdistention or proctitis.     CT coronary angiogram chest 7/6/23  Impression:   1.  No pulmonary emboli.   2.  No thoracic aortic aneurysm or dissection   3.  No pneumonia         The patient's records and results personally reviewed by this provider.     Outside records reviewed from: Care Everywhere    LAB/DIAGNOSTIC STUDIES TODAY:  Lab Results   Component Value Date    WBC 9.5 08/22/2023    WBC 11.3 06/17/2021     Lab Results   Component Value Date    RBC 4.42 08/22/2023    RBC 3.78 06/17/2021     Lab Results   Component Value Date    HGB 12.2 08/22/2023    HGB 11.5 06/17/2021     Lab Results   Component Value Date    HCT 37.5 08/22/2023    HCT 35.7 06/17/2021     Lab Results   Component Value Date    MCV 85 08/22/2023    MCV 94 06/17/2021     Lab Results   Component Value Date    MCH 27.6 08/22/2023    MCH 30.4 06/17/2021     Lab Results   Component Value Date    MCHC 32.5 08/22/2023    MCHC 32.2 06/17/2021     Lab Results   Component Value Date    RDW 14.5 08/22/2023    RDW 13.6 06/17/2021     Lab Results   Component Value Date     08/22/2023     06/18/2021     Last Comprehensive Metabolic Panel:  Sodium   Date Value Ref Range Status   08/22/2023 138 136 - 145 mmol/L Final   06/17/2021 140 133 - 144 mmol/L Final     Potassium   Date Value Ref Range Status   08/22/2023 4.1 3.4 - 5.3 mmol/L Final   05/18/2022 4.0 3.4 - 5.3 mmol/L Final   06/17/2021 4.1 3.4 - 5.3 mmol/L Final     Chloride   Date Value Ref Range Status   08/22/2023 104 98 - 107 mmol/L Final   05/18/2022 104 94 - 109 mmol/L Final   06/17/2021 109 94 - 109 mmol/L  Final     Carbon Dioxide   Date Value Ref Range Status   06/17/2021 28 20 - 32 mmol/L Final     Carbon Dioxide (CO2)   Date Value Ref Range Status   08/22/2023 24 22 - 29 mmol/L Final   05/18/2022 24 20 - 32 mmol/L Final     Anion Gap   Date Value Ref Range Status   08/22/2023 10 7 - 15 mmol/L Final   05/18/2022 10 3 - 14 mmol/L Final   06/17/2021 3 3 - 14 mmol/L Final     Glucose   Date Value Ref Range Status   08/22/2023 92 70 - 99 mg/dL Final   05/18/2022 124 (H) 70 - 99 mg/dL Final   06/17/2021 96 70 - 99 mg/dL Final     GLUCOSE BY METER POCT   Date Value Ref Range Status   01/13/2023 138 (H) 70 - 99 mg/dL Final     Urea Nitrogen   Date Value Ref Range Status   08/22/2023 12.9 6.0 - 20.0 mg/dL Final   05/18/2022 15 7 - 30 mg/dL Final   06/17/2021 6 (L) 7 - 30 mg/dL Final     Creatinine   Date Value Ref Range Status   08/22/2023 0.77 0.51 - 0.95 mg/dL Final   06/17/2021 0.65 0.52 - 1.04 mg/dL Final     GFR Estimate   Date Value Ref Range Status   08/22/2023 >90 >60 mL/min/1.73m2 Final   06/17/2021 >90 >60 mL/min/[1.73_m2] Final     Comment:     Non  GFR Calc  Starting 12/18/2018, serum creatinine based estimated GFR (eGFR) will be   calculated using the Chronic Kidney Disease Epidemiology Collaboration   (CKD-EPI) equation.       Calcium   Date Value Ref Range Status   08/22/2023 10.2 (H) 8.6 - 10.0 mg/dL Final   06/17/2021 8.1 (L) 8.5 - 10.1 mg/dL Final     Bilirubin Total   Date Value Ref Range Status   08/22/2023 0.2 <=1.2 mg/dL Final   06/04/2021 0.3 0.2 - 1.3 mg/dL Final     Alkaline Phosphatase   Date Value Ref Range Status   08/22/2023 101 35 - 104 U/L Final   06/04/2021 54 40 - 150 U/L Final     ALT   Date Value Ref Range Status   08/22/2023 28 0 - 50 U/L Final     Comment:     Reference intervals for this test were updated on 6/12/2023 to more accurately reflect our healthy population. There may be differences in the flagging of prior results with similar values performed with this  "method. Interpretation of those prior results can be made in the context of the updated reference intervals.     06/04/2021 42 0 - 50 U/L Final     AST   Date Value Ref Range Status   08/22/2023 21 0 - 45 U/L Final     Comment:     Reference intervals for this test were updated on 6/12/2023 to more accurately reflect our healthy population. There may be differences in the flagging of prior results with similar values performed with this method. Interpretation of those prior results can be made in the context of the updated reference intervals.   06/04/2021 12 0 - 45 U/L Final     Assessment    Doretha Fernandez is a 48 year old female seen as a PAC referral for risk assessment and optimization for anesthesia.    Plan/Recommendations  Pt will be optimized for the proposed procedure.  See below for details on the assessment, risk, and preoperative recommendations    NEUROLOGY  History of stroke during C section in 2004. Residual left side weakness. No recurrence.   Was found to have prothrombin deficiency. Was on baby aspirin daily for years but no longer taking  She has had several MRIs of the brain which show a chronic cortical and subcortical signal abnormality in the right parietal region. Her last was in 2017.   Chronic pain of joints, possible fibromyalgia. May take Tylenol on DOS. Vaping cannabis. Some edibles.   Denies seizure history.   -Post Op delirium risk factors:  High co-morbid index    ENT  - Physical exam is concerning for complicated airway.  Mallampati: II  TM: > 3  Small mouth opening  Previous airway notes:   6/15/21  \"Placement Date: 06/15/21; Placement Time: 1043 (created via procedure documentation); Mask Ventilation: 2; Induction Type: Intravenous; Ease of Intubation: Easy; Technique: Video laryngoscopy, Flexible bronchoscopy (h/o difficult VL view of glottis. ); ETT Type: Single; Tube Size: 7 mm; VL Blade Size: Glide scope 3; Grade View: 1; Adjucts: Stylet; Placement Person: Resident; Attempts: " "1; Depth: 21 cm \"    \"Mask Ventilation: Easy with oral airway; Ease of Intubation: Easy; Airway Size: 7;  Cuffed;  Oral;  Blade Type: D-MAC;  Blade Size: 4;  Place by: FRAN FLAHERTY;  Insertion Attempts: 1;  Secured at (cm)to lip: 21 cm;  Breath Sounds: Equal, clear and bilateral;  End Tidal CO2: Present;  Dentition: Intact, Unchanged;  Grade View of Cords: 3;  Airway Adjuncts:  C-Mac \"    CARDIAC  High cholesterol being evaluated by PCP. No meds at this time. No other cardiac history, symptoms or meds. Good activity tolerance. Able to walk distance without exertional symptoms. Echocardiogram above.   - METS (Metabolic Equivalents)>4    RCRI: 0.9% risk of serious cardiac events    PULMONARY  Asthma with minimal symptoms and rare use of inhaler.   STORMY but no longer uses CPAP  Reports being told that her sats drop while sleeping during hospitalization.   Has lost 60# since November.   Has plan to get updated sleep study.   - Tobacco History    History   Smoking Status    Former    Packs/day: 1.00    Years: 5.00    Types: Cigarettes    Quit date: 3/20/1998   Smokeless Tobacco    Never       GI: Denies GERD  History of autoimmune colitis after immunotherapy for malignant melanoma - resolved.   s/p colectomy 6/15/21. Ileostomy status  PONV Medium Risk  Total Score: 2           1 AN PONV: Pt is Female    1 AN PONV: Patient is not a current smoker        /RENAL  - Baseline Creatinine  0.77    ENDOCRINE    - BMI: Estimated body mass index is 36.49 kg/m  as calculated from the following:    Height as of this encounter: 1.6 m (5' 3\").    Weight as of this encounter: 93.4 kg (206 lb).  Obesity (BMI >30)  DIABETES MELLITUS II. Prednisone induced, currently on metformin but will hold on DOS. Last A1C 6.3.   Intentional weight loss of 60# in preparation for surgery. Will hold phentermine for 7 days prior to surgery    HEME: VTE risk 0.26%  Denies personal or family history of blood clots  Denies history of blood transfusion "   Prothrombin deficiency as above.    MSK: The patient has inflammatory arthritis - this is secondary to her immunotherapy for malignant melanoma. She is followed by rheumatologist at [facility]   Widespread joint pain. Tylenol prn. Vaping cannabis  No longer on prednisone      PSYCH  - Anxiety and depression. Will take Effexor on DOS.   Patient reports getting more and more anxious as she gets closer to surgery. She does not want to see the OR, and is requesting early sedation if possible.     Skin: History of BCC of eyelid/ malignant melanoma - patient is s/p lymph node dissection on 10/23/17 by Dr. Cool. She has lymphedema secondary to dissection.     Access: Protecting left arm   Difficult IV access      Optimal recovery pathway      Different anesthesia methods/types have been discussed with the patient, but they are aware that the final plan will be decided by the assigned anesthesia provider on the date of service.  Patient was discussed with Dr Mera    The patient is optimized for their procedure. AVS with information on surgery time/arrival time, meds and NPO status given by nursing staff. No further diagnostic testing indicated.      On the day of service:     Prep time: 16 minutes  Visit time: 15 minutes  Documentation time: 20 minutes  ------------------------------------------  Total time: 51 minutes      MAADA Pollock CNS  Preoperative Assessment Center  [facility]  Clinic and Surgery Center  Phone: [phone]  Fax: [phone]

## 2023-08-25 ENCOUNTER — TEAM CONFERENCE (OUTPATIENT)
Dept: SURGERY | Facility: CLINIC | Age: 48
End: 2023-08-25
Payer: COMMERCIAL

## 2023-08-25 NOTE — PROGRESS NOTES
COLON AND RECTAL SURGERY HUDDLE:    Patient was reviewed in preparation for their surgery the following was reviewed and has been completed:    Surgeon: Dr. Quinton Ram     Surgery & Date: 9/5     COMPLETION PROCTECTOMY, ROBOT-ASSISTED, ILEAL POUCH ANASTAMOSIS     Last MD Note: reviewed    Anesthesia Type: General    Other Providers: No    PAC: Yes    WOC: N/A    Labs: Yes    Bowel Prep: No No Prep    Packet: Yes    Imaging: N/A    Post-Op Appointments: Yes    Pre op soap: Yes Questions about shower: no    Is patient on TPN?: N/A   If yes, I contacted the TPN pharmacist by paging the  pharmacy at [phone] or calling [phone]. I also contacted Lorna PARKER with inpatient colon and rectal team.     Pre op call complete: Yes     Surgery Clinical Trials Office Consent Approach Documentation    Building A Robotic Surgical Video Registry for Future Use in Surgical Automation (IRB [id number])    ICF Version Date / IRB Approval Date:  Version dt 9/7/2022, IRB ([place]) Approved 10/12/2022    I spoke with Doretha Fernandez by phone on 8/25/2023, and they would like to be contacted by Robotics study staff to proceed with e-consent process.

## 2023-08-28 ENCOUNTER — ALLIED HEALTH/NURSE VISIT (OUTPATIENT)
Dept: RESEARCH | Facility: CLINIC | Age: 48
End: 2023-08-28
Payer: COMMERCIAL

## 2023-08-28 DIAGNOSIS — Z00.6 RESEARCH EXAM: Primary | ICD-10-CM

## 2023-08-28 NOTE — PROGRESS NOTES
.Surgery Clinical Trials Office Informed Consent Process Documentation      Building A Robotic Surgical Video Registry for Future Use in Surgical Automation.     Protocol Version 1.5, IRB Approved 12 OCT 2022    PI:  Quinton Ram MD, PhD    Date of Approach/Consent: 08/28/23    I met with this patient today to discuss the Robotics study.    The subject was screened and meets all of the inclusion criteria and none of the exclusion criteria is met.      The subject was told:  -that the study involves research   -the purpose of the research study  -the expected duration of the study and the approximate number of subjects sought  -of procedures that are identified as experimental  -of reasonably foreseeable risks or discomforts to the subject  -of any benefits to the subject or others that may be expected from the research  -of alternative procedures and/or treatment  -how the confidentiality of records would be maintained  -whether or not compensation and medical treatments are available should injury occur as a result of the study  -who to contact if they have questions related to the research study or questions regarding research subjects' rights  -that participation is completely voluntary and that their decision to or not to participate will have no impact on their relationships with the [facility] and the staff    No study procedures were completed prior to the consent being obtained.    The subject demonstrated an understanding of what the study involved.  Specifically, how this study differed from standard of care at our center and what was required of the subject as part of the study.    The subject reviewed the consent form and was given the opportunity to ask questions before signing.  Questions and concerns were answered by the study staff and/or study physician.    I confirm that a copy of the signed ICF was provided to the subject.  [x] Yes [] No    The subject was offered a copy of the signed informed  consent but declined. [] Yes [x] No    The consent required the use of a :   [] Yes [x]  No       A 'short form' consent was used:     [] Yes [x] No         If yes, provide name of witness:     The subject required a legally-authorized representative (LAR) to sign on their behalf:                                                    [] Yes  [x] No   If yes, please record name of LAR:     Questions to Evaluate Subject Comprehension of Study:    Question: Adequate Response? If No, explain what actions were taken   What is being studied? [x] Yes  [] No   Can you describe how the video of your surgery will be stored?  [x] Yes  [] No   How long will the study last; will you be required to return for visits? [x] Yes  [] No   What kinds of risks are there? [x] Yes  [] No   Do you understand that you can withdraw consent at any time and for any reason while participating in the study? [x] Yes  [] No       Study Coordinators: Dom Wiley ([phone])  Tatiana Boyer ([phone])                                                                           : Chanelle Viera  ([phone])

## 2023-09-04 ASSESSMENT — LIFESTYLE VARIABLES: TOBACCO_USE: 1

## 2023-09-04 ASSESSMENT — ENCOUNTER SYMPTOMS: DYSRHYTHMIAS: 0

## 2023-09-04 NOTE — ANESTHESIA PREPROCEDURE EVALUATION
Anesthesia Pre-Procedure Evaluation    Patient: Doretha Fernandez   MRN: [id number] : 1975        Procedure : Procedure(s):  COMPLETION PROCTECTOMY, ROBOT-ASSISTED, ILEAL POUCH ANASTAMOSIS          Past Medical History:   Diagnosis Date    Abnormal MRI     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     Anxiety     Basal cell carcinoma     Cervical high risk HPV (human papillomavirus) test positive 2019    See problem list    Colitis     Depression     Diabetes mellitus, iatrogenic (H) 2020    Esophageal reflux     Inflammatory arthritis     Insomnia     Intestinal giardiasis 2018    Lumbago     left lower back pain    Lymphedema     Malignant melanoma (H)     Melanoma (H) 10/23/2017    Migraines     Mild persistent asthma     Morbid obesity with BMI of 40.0-44.9, adult (H)     STORMY (obstructive sleep apnea)     Prothrombin deficiency (H)     takes 81mg asa daily    Stroke (cerebrum) (H)     During     TIA (transient ischemic attack)     Type 2 diabetes mellitus (H)     Ulcerative pancolitis (H)       Past Surgical History:   Procedure Laterality Date    APPENDECTOMY      COLONOSCOPY N/A 10/18/2017    Procedure: COLONOSCOPY;  Colon;  Surgeon: Debbie Stephens MD;  Location: [facility]    COLONOSCOPY N/A 2018    Procedure: COMBINED COLONOSCOPY, SINGLE OR MULTIPLE BIOPSY/POLYPECTOMY BY BIOPSY;  colon;  Surgeon: Benita Schumacher MD;  Location: [facility] GI    COLONOSCOPY      multiple since  to present - about 6 total    DISSECT LYMPH NODE AXILLA Left 10/23/2017    Procedure: DISSECT LYMPH NODE AXILLA;  Left Axillary Lymph Node Dissection ;  Surgeon: Laurent Cool MD;  Location: [facility] OR    EXAM UNDER ANESTHESIA PELVIC N/A 2020    Procedure: EXAM UNDER ANESTHESIA, PELVIS; with Cervical Biopsies, Vaginal Biopsy and Endocervical Curettings;  Surgeon: Melina Jung MD;  Location: [facility] OR    GYN SURGERY  ,         LAPAROSCOPIC ASSISTED COLECTOMY N/A  06/15/2021    Procedure: laparoscopic total abdominal colectomy, end ileostomy;  Surgeon: Quinton Ram MD;  Location: [facility] OR    REPAIR MOHS Left 2017    Procedure: REPAIR MOHS;  Left Upper Lid Moh's Reconstruction;  Surgeon: Kisha Bosch MD;  Location: [facility] OR      Allergies   Allergen Reactions    Bee Venom Swelling    Azithromycin Diarrhea    Erythromycin      Other reaction(s): GI intolerance, Vomiting    Fentanyl Other (See Comments)     sweating  sweating    Prochlorperazine Fatigue     Other reaction(s): Other (see comments)  Fatigue    Buspirone      Other reaction(s): GI intolerance  vomiting    Erythrocin Nausea and Vomiting    Topamax [Topiramate]      Made her lethargic    Zithromax [Azithromycin Dihydrate] Diarrhea    Enbrel [Etanercept] Hives and Rash      Social History     Tobacco Use    Smoking status: Former     Packs/day: 1.00     Years: 5.00     Pack years: 5.00     Types: Cigarettes     Quit date: 3/20/1998     Years since quittin.4    Smokeless tobacco: Never   Substance Use Topics    Alcohol use: Not Currently      Wt Readings from Last 1 Encounters:   23 93.4 kg (206 lb)        Anesthesia Evaluation   Pt has had prior anesthetic. Type: General and MAC.    History of anesthetic complications   STORMY.    ROS/MED HX  ENT/Pulmonary: Comment: Patient has been diagnosed with STORMY, but does not use CPAP. Reported had been told while hospitalized that her 02 sat dropped when sleeping and that she should be using her CPAP. Has lost 60# since November. Will be having updated study    (+) sleep apnea, doesn't use CPAP,              tobacco use, Past use,   Intermittent, asthma Last exacerbation: Rare use of albuterol,  Treatment: Inhaler prn,              (-) recent URI   Neurologic: Comment: Poss fibromyalgia    (+)      migraines,    CVA, date:  during c section, with deficits, - generalized left side weakness.                   Cardiovascular:     (+) Dyslipidemia -  -   -  - -                                 Previous cardiac testing   Echo: Date: 4/2021 Results:    Stress Test:  Date: Results:    ECG Reviewed:  Date: 1/11/23 Results:  SR  Cath:  Date: Results:   (-) taking anticoagulants/antiplatelets, ADKINS and arrhythmias   METS/Exercise Tolerance: >4 METS Comment: Walked 5-6 miles at [employer] yesterday. Going [place] tomorrow   Hematologic: Comments: Prothrombin deficiency    (+)      anemia,       (-) history of blood clots and history of blood transfusion   Musculoskeletal: Comment: Chronic joint pain  (+)  arthritis,             GI/Hepatic: Comment: Ileostomy status    (+) GERD, Asymptomatic on medication,     Inflammatory bowel disease,             Renal/Genitourinary:  - neg Renal ROS     Endo: Comment: Prednisone induced DIABETES MELLITUS on metformin     (+)  type II DM, Last HgA1c: 6.3, date: 7/11/23, Not using insulin, - not using insulin pump. Normal glucose range: does not self monitor,        Obesity,    (-) chronic steroid usage   Psychiatric/Substance Use: Comment: Vaping cannabis or using edibles for pain     (+) psychiatric history anxiety and depression       Infectious Disease:  - neg infectious disease ROS     Malignancy:   (+) Malignancy, History of Skin and Other.Skin CA Remission status post Surgery.  Other CA melanoma-no primary found, immunotherapy Remission status post Surgery.    Other:  - neg other ROS    (+)  , H/O Chronic Pain,         Physical Exam    Airway        Mallampati: II       Respiratory Devices and Support         Dental       (+) Minor Abnormalities - some fillings, tiny chips      Cardiovascular   cardiovascular exam normal          Pulmonary   pulmonary exam normal                OUTSIDE LABS:  CBC:   Lab Results   Component Value Date    WBC 9.5 08/22/2023    WBC 10.4 07/11/2023    HGB 12.2 08/22/2023    HGB 12.7 07/11/2023    HCT 37.5 08/22/2023    HCT 38.7 07/11/2023     08/22/2023     07/11/2023     BMP:   Lab  Results   Component Value Date     08/22/2023     07/11/2023    POTASSIUM 4.1 08/22/2023    POTASSIUM 4.3 07/11/2023    CHLORIDE 104 08/22/2023    CHLORIDE 106 07/11/2023    CO2 24 08/22/2023    CO2 20 (L) 07/11/2023    BUN 12.9 08/22/2023    BUN 8.9 07/11/2023    CR 0.77 08/22/2023    CR 0.71 07/11/2023    GLC 92 08/22/2023     (H) 07/11/2023     COAGS:   Lab Results   Component Value Date    PTT 23 05/06/2021    INR 0.95 01/12/2023     POC:   Lab Results   Component Value Date     (H) 06/19/2021    HCG Negative 06/15/2021    HCGS Negative 08/14/2022     HEPATIC:   Lab Results   Component Value Date    ALBUMIN 4.4 08/22/2023    PROTTOTAL 7.5 08/22/2023    ALT 28 08/22/2023    AST 21 08/22/2023    ALKPHOS 101 08/22/2023    BILITOTAL 0.2 08/22/2023     OTHER:   Lab Results   Component Value Date    LACT 1.3 08/14/2022    A1C 6.3 (H) 07/11/2023    PATRICIA 10.2 (H) 08/22/2023    PHOS 2.7 05/06/2021    MAG 2.0 11/11/2022    LIPASE 82 (H) 03/29/2023    TSH 2.86 04/14/2022    T4 0.86 04/14/2022    CRP 18.3 (H) 04/14/2022    SED 29 (H) 04/10/2023       Anesthesia Plan    ASA Status:  3       Anesthesia Type: General.     - Airway: ETT   Induction: Intravenous, Propofol.   Maintenance: Balanced.   Techniques and Equipment:     - Lines/Monitors: 2nd IV     Consents    Anesthesia Plan(s) and associated risks, benefits, and realistic alternatives discussed. Questions answered and patient/representative(s) expressed understanding.     - Discussed:     - Discussed with:  Patient      - Extended Intubation/Ventilatory Support Discussed: No.      - Patient is DNR/DNI Status: No     Use of blood products discussed: Yes.     - Discussed with: Patient.     Postoperative Care    Pain management: IV analgesics.   PONV prophylaxis: Ondansetron (or other 5HT-3), Dexamethasone or Solumedrol, Background Propofol Infusion     Comments:           H&P reviewed: Unable to attach H&P to encounter due to EHR limitations.  H&P Update: appropriate H&P reviewed, patient examined. No interval changes since H&P (within 30 days).         LAZARO CHU MD

## 2023-09-05 ENCOUNTER — HOSPITAL ENCOUNTER (INPATIENT)
Facility: CLINIC | Age: 48
LOS: 6 days | Discharge: HOME | DRG: 330 | End: 2023-09-11
Attending: SURGERY | Admitting: SURGERY
Payer: COMMERCIAL

## 2023-09-05 ENCOUNTER — ANESTHESIA (OUTPATIENT)
Dept: SURGERY | Facility: CLINIC | Age: 48
DRG: 330 | End: 2023-09-05
Payer: COMMERCIAL

## 2023-09-05 DIAGNOSIS — K51.011 ULCERATIVE PANCOLITIS WITH RECTAL BLEEDING (H): ICD-10-CM

## 2023-09-05 DIAGNOSIS — G89.18 ACUTE POST-OPERATIVE PAIN: ICD-10-CM

## 2023-09-05 DIAGNOSIS — K21.9 GASTROESOPHAGEAL REFLUX DISEASE WITHOUT ESOPHAGITIS: Chronic | ICD-10-CM

## 2023-09-05 DIAGNOSIS — Z93.2 ILEOSTOMY STATUS (H): ICD-10-CM

## 2023-09-05 DIAGNOSIS — K51.919 ULCERATIVE COLITIS WITH COMPLICATION, UNSPECIFIED LOCATION (H): Primary | ICD-10-CM

## 2023-09-05 DIAGNOSIS — Z43.2 ATTENTION TO ILEOSTOMY (H): ICD-10-CM

## 2023-09-05 LAB
GLUCOSE BLDC GLUCOMTR-MCNC: 110 MG/DL (ref 70–99)
GLUCOSE BLDC GLUCOMTR-MCNC: 153 MG/DL (ref 70–99)

## 2023-09-05 PROCEDURE — 360N000080 HC SURGERY LEVEL 7, PER MIN: Performed by: SURGERY

## 2023-09-05 PROCEDURE — 45397 LAP REMOVE RECTUM W/POUCH: CPT | Performed by: SURGERY

## 2023-09-05 PROCEDURE — 0DUP47Z SUPPLEMENT RECTUM WITH AUTOLOGOUS TISSUE SUBSTITUTE, PERCUTANEOUS ENDOSCOPIC APPROACH: ICD-10-PCS | Performed by: SURGERY

## 2023-09-05 PROCEDURE — 370N000017 HC ANESTHESIA TECHNICAL FEE, PER MIN: Performed by: SURGERY

## 2023-09-05 PROCEDURE — 250N000011 HC RX IP 250 OP 636: Performed by: SURGERY

## 2023-09-05 PROCEDURE — 258N000003 HC RX IP 258 OP 636: Performed by: NURSE ANESTHETIST, CERTIFIED REGISTERED

## 2023-09-05 PROCEDURE — 250N000013 HC RX MED GY IP 250 OP 250 PS 637: Performed by: STUDENT IN AN ORGANIZED HEALTH CARE EDUCATION/TRAINING PROGRAM

## 2023-09-05 PROCEDURE — 80048 BASIC METABOLIC PNL TOTAL CA: CPT

## 2023-09-05 PROCEDURE — 250N000011 HC RX IP 250 OP 636

## 2023-09-05 PROCEDURE — 258N000003 HC RX IP 258 OP 636: Performed by: STUDENT IN AN ORGANIZED HEALTH CARE EDUCATION/TRAINING PROGRAM

## 2023-09-05 PROCEDURE — 250N000011 HC RX IP 250 OP 636: Performed by: STUDENT IN AN ORGANIZED HEALTH CARE EDUCATION/TRAINING PROGRAM

## 2023-09-05 PROCEDURE — 272N000001 HC OR GENERAL SUPPLY STERILE: Performed by: SURGERY

## 2023-09-05 PROCEDURE — 250N000011 HC RX IP 250 OP 636: Mod: JZ | Performed by: NURSE ANESTHETIST, CERTIFIED REGISTERED

## 2023-09-05 PROCEDURE — 250N000009 HC RX 250: Performed by: NURSE ANESTHETIST, CERTIFIED REGISTERED

## 2023-09-05 PROCEDURE — 250N000011 HC RX IP 250 OP 636: Performed by: ANESTHESIOLOGY

## 2023-09-05 PROCEDURE — 85027 COMPLETE CBC AUTOMATED: CPT

## 2023-09-05 PROCEDURE — 0DTP4ZZ RESECTION OF RECTUM, PERCUTANEOUS ENDOSCOPIC APPROACH: ICD-10-PCS | Performed by: SURGERY

## 2023-09-05 PROCEDURE — 8E0W4CZ ROBOTIC ASSISTED PROCEDURE OF TRUNK REGION, PERCUTANEOUS ENDOSCOPIC APPROACH: ICD-10-PCS | Performed by: SURGERY

## 2023-09-05 PROCEDURE — 120N000002 HC R&B MED SURG/OB UMMC

## 2023-09-05 PROCEDURE — 88307 TISSUE EXAM BY PATHOLOGIST: CPT | Mod: 26 | Performed by: PATHOLOGY

## 2023-09-05 PROCEDURE — 36415 COLL VENOUS BLD VENIPUNCTURE: CPT

## 2023-09-05 PROCEDURE — 0D1B0Z4 BYPASS ILEUM TO CUTANEOUS, OPEN APPROACH: ICD-10-PCS | Performed by: SURGERY

## 2023-09-05 PROCEDURE — C9290 INJ, BUPIVACAINE LIPOSOME: HCPCS | Performed by: STUDENT IN AN ORGANIZED HEALTH CARE EDUCATION/TRAINING PROGRAM

## 2023-09-05 PROCEDURE — 250N000013 HC RX MED GY IP 250 OP 250 PS 637: Performed by: SURGERY

## 2023-09-05 PROCEDURE — 250N000011 HC RX IP 250 OP 636: Performed by: NURSE ANESTHETIST, CERTIFIED REGISTERED

## 2023-09-05 PROCEDURE — 88304 TISSUE EXAM BY PATHOLOGIST: CPT | Mod: 26 | Performed by: PATHOLOGY

## 2023-09-05 PROCEDURE — 250N000011 HC RX IP 250 OP 636: Mod: JZ | Performed by: ANESTHESIOLOGY

## 2023-09-05 PROCEDURE — 999N000141 HC STATISTIC PRE-PROCEDURE NURSING ASSESSMENT: Performed by: SURGERY

## 2023-09-05 PROCEDURE — 710N000010 HC RECOVERY PHASE 1, LEVEL 2, PER MIN: Performed by: SURGERY

## 2023-09-05 PROCEDURE — 250N000025 HC SEVOFLURANE, PER MIN: Performed by: SURGERY

## 2023-09-05 PROCEDURE — 88307 TISSUE EXAM BY PATHOLOGIST: CPT | Mod: TC | Performed by: SURGERY

## 2023-09-05 RX ORDER — ONDANSETRON 2 MG/ML
4 INJECTION INTRAMUSCULAR; INTRAVENOUS EVERY 30 MIN PRN
Status: DISCONTINUED | OUTPATIENT
Start: 2023-09-05 | End: 2023-09-05 | Stop reason: HOSPADM

## 2023-09-05 RX ORDER — NALOXONE HYDROCHLORIDE 0.4 MG/ML
0.4 INJECTION, SOLUTION INTRAMUSCULAR; INTRAVENOUS; SUBCUTANEOUS
Status: DISCONTINUED | OUTPATIENT
Start: 2023-09-05 | End: 2023-09-05 | Stop reason: HOSPADM

## 2023-09-05 RX ORDER — ONDANSETRON 4 MG/1
4 TABLET, ORALLY DISINTEGRATING ORAL EVERY 6 HOURS PRN
Status: DISCONTINUED | OUTPATIENT
Start: 2023-09-05 | End: 2023-09-11 | Stop reason: HOSPADM

## 2023-09-05 RX ORDER — ACETAMINOPHEN 325 MG/1
975 TABLET ORAL EVERY 8 HOURS
Status: DISCONTINUED | OUTPATIENT
Start: 2023-09-05 | End: 2023-09-05

## 2023-09-05 RX ORDER — HYDROMORPHONE HCL IN WATER/PF 6 MG/30 ML
0.4 PATIENT CONTROLLED ANALGESIA SYRINGE INTRAVENOUS EVERY 5 MIN PRN
Status: DISCONTINUED | OUTPATIENT
Start: 2023-09-05 | End: 2023-09-05 | Stop reason: HOSPADM

## 2023-09-05 RX ORDER — ACETAMINOPHEN 325 MG/1
975 TABLET ORAL EVERY 8 HOURS
Status: DISCONTINUED | OUTPATIENT
Start: 2023-09-05 | End: 2023-09-06

## 2023-09-05 RX ORDER — SODIUM CHLORIDE, SODIUM LACTATE, POTASSIUM CHLORIDE, CALCIUM CHLORIDE 600; 310; 30; 20 MG/100ML; MG/100ML; MG/100ML; MG/100ML
INJECTION, SOLUTION INTRAVENOUS CONTINUOUS
Status: DISCONTINUED | OUTPATIENT
Start: 2023-09-05 | End: 2023-09-05 | Stop reason: HOSPADM

## 2023-09-05 RX ORDER — PROPOFOL 10 MG/ML
INJECTION, EMULSION INTRAVENOUS PRN
Status: DISCONTINUED | OUTPATIENT
Start: 2023-09-05 | End: 2023-09-05

## 2023-09-05 RX ORDER — FENTANYL CITRATE 50 UG/ML
INJECTION, SOLUTION INTRAMUSCULAR; INTRAVENOUS PRN
Status: DISCONTINUED | OUTPATIENT
Start: 2023-09-05 | End: 2023-09-05

## 2023-09-05 RX ORDER — ONDANSETRON 2 MG/ML
INJECTION INTRAMUSCULAR; INTRAVENOUS PRN
Status: DISCONTINUED | OUTPATIENT
Start: 2023-09-05 | End: 2023-09-05

## 2023-09-05 RX ORDER — SODIUM CHLORIDE, SODIUM LACTATE, POTASSIUM CHLORIDE, CALCIUM CHLORIDE 600; 310; 30; 20 MG/100ML; MG/100ML; MG/100ML; MG/100ML
INJECTION, SOLUTION INTRAVENOUS CONTINUOUS
Status: DISCONTINUED | OUTPATIENT
Start: 2023-09-05 | End: 2023-09-07

## 2023-09-05 RX ORDER — NALOXONE HYDROCHLORIDE 0.4 MG/ML
0.2 INJECTION, SOLUTION INTRAMUSCULAR; INTRAVENOUS; SUBCUTANEOUS
Status: DISCONTINUED | OUTPATIENT
Start: 2023-09-05 | End: 2023-09-05 | Stop reason: HOSPADM

## 2023-09-05 RX ORDER — ONDANSETRON 2 MG/ML
4 INJECTION INTRAMUSCULAR; INTRAVENOUS ONCE
Status: DISCONTINUED | OUTPATIENT
Start: 2023-09-05 | End: 2023-09-05 | Stop reason: HOSPADM

## 2023-09-05 RX ORDER — LIDOCAINE 40 MG/G
CREAM TOPICAL
Status: DISCONTINUED | OUTPATIENT
Start: 2023-09-05 | End: 2023-09-11 | Stop reason: HOSPADM

## 2023-09-05 RX ORDER — OXYCODONE HYDROCHLORIDE 10 MG/1
10 TABLET ORAL
Status: DISCONTINUED | OUTPATIENT
Start: 2023-09-05 | End: 2023-09-05 | Stop reason: HOSPADM

## 2023-09-05 RX ORDER — HYDROMORPHONE HCL IN WATER/PF 6 MG/30 ML
0.2 PATIENT CONTROLLED ANALGESIA SYRINGE INTRAVENOUS EVERY 5 MIN PRN
Status: DISCONTINUED | OUTPATIENT
Start: 2023-09-05 | End: 2023-09-05 | Stop reason: HOSPADM

## 2023-09-05 RX ORDER — CEFAZOLIN SODIUM/WATER 2 G/20 ML
2 SYRINGE (ML) INTRAVENOUS
Status: COMPLETED | OUTPATIENT
Start: 2023-09-05 | End: 2023-09-05

## 2023-09-05 RX ORDER — VENLAFAXINE 50 MG/1
50 TABLET ORAL 2 TIMES DAILY
Status: DISCONTINUED | OUTPATIENT
Start: 2023-09-05 | End: 2023-09-05

## 2023-09-05 RX ORDER — PROCHLORPERAZINE MALEATE 5 MG
10 TABLET ORAL EVERY 6 HOURS PRN
Status: DISCONTINUED | OUTPATIENT
Start: 2023-09-05 | End: 2023-09-11 | Stop reason: HOSPADM

## 2023-09-05 RX ORDER — ONDANSETRON 4 MG/1
4 TABLET, ORALLY DISINTEGRATING ORAL EVERY 30 MIN PRN
Status: DISCONTINUED | OUTPATIENT
Start: 2023-09-05 | End: 2023-09-05 | Stop reason: HOSPADM

## 2023-09-05 RX ORDER — CEFAZOLIN SODIUM/WATER 2 G/20 ML
2 SYRINGE (ML) INTRAVENOUS SEE ADMIN INSTRUCTIONS
Status: DISCONTINUED | OUTPATIENT
Start: 2023-09-05 | End: 2023-09-05 | Stop reason: HOSPADM

## 2023-09-05 RX ORDER — LIDOCAINE 40 MG/G
CREAM TOPICAL
Status: DISCONTINUED | OUTPATIENT
Start: 2023-09-05 | End: 2023-09-05 | Stop reason: HOSPADM

## 2023-09-05 RX ORDER — ONDANSETRON 2 MG/ML
4 INJECTION INTRAMUSCULAR; INTRAVENOUS EVERY 6 HOURS PRN
Status: DISCONTINUED | OUTPATIENT
Start: 2023-09-05 | End: 2023-09-11 | Stop reason: HOSPADM

## 2023-09-05 RX ORDER — FENTANYL CITRATE 50 UG/ML
50 INJECTION, SOLUTION INTRAMUSCULAR; INTRAVENOUS ONCE
Status: COMPLETED | OUTPATIENT
Start: 2023-09-05 | End: 2023-09-05

## 2023-09-05 RX ORDER — ENOXAPARIN SODIUM 100 MG/ML
40 INJECTION SUBCUTANEOUS
Status: DISCONTINUED | OUTPATIENT
Start: 2023-09-05 | End: 2023-09-05 | Stop reason: HOSPADM

## 2023-09-05 RX ORDER — BUPIVACAINE HYDROCHLORIDE 2.5 MG/ML
INJECTION, SOLUTION EPIDURAL; INFILTRATION; INTRACAUDAL
Status: COMPLETED | OUTPATIENT
Start: 2023-09-05 | End: 2023-09-05

## 2023-09-05 RX ORDER — SODIUM CHLORIDE, SODIUM LACTATE, POTASSIUM CHLORIDE, CALCIUM CHLORIDE 600; 310; 30; 20 MG/100ML; MG/100ML; MG/100ML; MG/100ML
INJECTION, SOLUTION INTRAVENOUS CONTINUOUS PRN
Status: DISCONTINUED | OUTPATIENT
Start: 2023-09-05 | End: 2023-09-05

## 2023-09-05 RX ORDER — VENLAFAXINE 75 MG/1
150 TABLET ORAL 2 TIMES DAILY
Status: DISCONTINUED | OUTPATIENT
Start: 2023-09-05 | End: 2023-09-11 | Stop reason: HOSPADM

## 2023-09-05 RX ORDER — FLUMAZENIL 0.1 MG/ML
0.2 INJECTION, SOLUTION INTRAVENOUS
Status: DISCONTINUED | OUTPATIENT
Start: 2023-09-05 | End: 2023-09-05 | Stop reason: HOSPADM

## 2023-09-05 RX ORDER — NALOXONE HYDROCHLORIDE 0.4 MG/ML
0.4 INJECTION, SOLUTION INTRAMUSCULAR; INTRAVENOUS; SUBCUTANEOUS
Status: DISCONTINUED | OUTPATIENT
Start: 2023-09-05 | End: 2023-09-11 | Stop reason: HOSPADM

## 2023-09-05 RX ORDER — METHOCARBAMOL 500 MG/1
500 TABLET, FILM COATED ORAL 4 TIMES DAILY
Status: DISCONTINUED | OUTPATIENT
Start: 2023-09-05 | End: 2023-09-06

## 2023-09-05 RX ORDER — ALBUTEROL SULFATE 90 UG/1
2 AEROSOL, METERED RESPIRATORY (INHALATION) EVERY 4 HOURS PRN
Status: DISCONTINUED | OUTPATIENT
Start: 2023-09-05 | End: 2023-09-11 | Stop reason: HOSPADM

## 2023-09-05 RX ORDER — LIDOCAINE HYDROCHLORIDE 20 MG/ML
INJECTION, SOLUTION INFILTRATION; PERINEURAL PRN
Status: DISCONTINUED | OUTPATIENT
Start: 2023-09-05 | End: 2023-09-05

## 2023-09-05 RX ORDER — ACETAMINOPHEN 325 MG/1
975 TABLET ORAL ONCE
Status: COMPLETED | OUTPATIENT
Start: 2023-09-05 | End: 2023-09-05

## 2023-09-05 RX ORDER — METRONIDAZOLE 500 MG/100ML
500 INJECTION, SOLUTION INTRAVENOUS
Status: COMPLETED | OUTPATIENT
Start: 2023-09-05 | End: 2023-09-05

## 2023-09-05 RX ORDER — OXYCODONE HYDROCHLORIDE 5 MG/1
5 TABLET ORAL
Status: DISCONTINUED | OUTPATIENT
Start: 2023-09-05 | End: 2023-09-05 | Stop reason: HOSPADM

## 2023-09-05 RX ORDER — FAMOTIDINE 20 MG/1
20 TABLET, FILM COATED ORAL 2 TIMES DAILY
Status: DISCONTINUED | OUTPATIENT
Start: 2023-09-05 | End: 2023-09-11 | Stop reason: HOSPADM

## 2023-09-05 RX ADMIN — SODIUM CHLORIDE, POTASSIUM CHLORIDE, SODIUM LACTATE AND CALCIUM CHLORIDE: 600; 310; 30; 20 INJECTION, SOLUTION INTRAVENOUS at 16:39

## 2023-09-05 RX ADMIN — Medication 20 MG: at 11:37

## 2023-09-05 RX ADMIN — Medication 2 G: at 09:15

## 2023-09-05 RX ADMIN — FAMOTIDINE 20 MG: 20 TABLET ORAL at 21:15

## 2023-09-05 RX ADMIN — Medication 50 MG: at 08:45

## 2023-09-05 RX ADMIN — HYDROMORPHONE HYDROCHLORIDE 0.2 MG: 0.2 INJECTION, SOLUTION INTRAMUSCULAR; INTRAVENOUS; SUBCUTANEOUS at 16:41

## 2023-09-05 RX ADMIN — Medication 2 G: at 13:15

## 2023-09-05 RX ADMIN — VENLAFAXINE 150 MG: 75 TABLET ORAL at 21:15

## 2023-09-05 RX ADMIN — Medication 50 MG: at 09:20

## 2023-09-05 RX ADMIN — SODIUM CHLORIDE, POTASSIUM CHLORIDE, SODIUM LACTATE AND CALCIUM CHLORIDE: 600; 310; 30; 20 INJECTION, SOLUTION INTRAVENOUS at 17:05

## 2023-09-05 RX ADMIN — FENTANYL CITRATE 50 MCG: 50 INJECTION, SOLUTION INTRAMUSCULAR; INTRAVENOUS at 08:01

## 2023-09-05 RX ADMIN — ACETAMINOPHEN 975 MG: 325 TABLET, FILM COATED ORAL at 08:13

## 2023-09-05 RX ADMIN — METRONIDAZOLE 500 MG: 500 INJECTION, SOLUTION INTRAVENOUS at 09:10

## 2023-09-05 RX ADMIN — SUGAMMADEX 300 MG: 100 INJECTION, SOLUTION INTRAVENOUS at 16:17

## 2023-09-05 RX ADMIN — Medication: at 17:11

## 2023-09-05 RX ADMIN — Medication 20 MG: at 14:57

## 2023-09-05 RX ADMIN — Medication 20 MG: at 10:28

## 2023-09-05 RX ADMIN — MIDAZOLAM 2 MG: 1 INJECTION INTRAMUSCULAR; INTRAVENOUS at 08:26

## 2023-09-05 RX ADMIN — ONDANSETRON 4 MG: 2 INJECTION INTRAMUSCULAR; INTRAVENOUS at 16:41

## 2023-09-05 RX ADMIN — SODIUM CHLORIDE, POTASSIUM CHLORIDE, SODIUM LACTATE AND CALCIUM CHLORIDE: 600; 310; 30; 20 INJECTION, SOLUTION INTRAVENOUS at 10:24

## 2023-09-05 RX ADMIN — HYDROMORPHONE HYDROCHLORIDE 0.4 MG: 0.2 INJECTION, SOLUTION INTRAMUSCULAR; INTRAVENOUS; SUBCUTANEOUS at 17:08

## 2023-09-05 RX ADMIN — FENTANYL CITRATE 50 MCG: 50 INJECTION, SOLUTION INTRAMUSCULAR; INTRAVENOUS at 07:53

## 2023-09-05 RX ADMIN — ACETAMINOPHEN 975 MG: 325 TABLET, FILM COATED ORAL at 21:04

## 2023-09-05 RX ADMIN — SODIUM CHLORIDE, POTASSIUM CHLORIDE, SODIUM LACTATE AND CALCIUM CHLORIDE: 600; 310; 30; 20 INJECTION, SOLUTION INTRAVENOUS at 08:26

## 2023-09-05 RX ADMIN — FENTANYL CITRATE 50 MCG: 50 INJECTION, SOLUTION INTRAMUSCULAR; INTRAVENOUS at 16:23

## 2023-09-05 RX ADMIN — Medication 20 MG: at 12:41

## 2023-09-05 RX ADMIN — FAMOTIDINE 20 MG: 10 INJECTION, SOLUTION INTRAVENOUS at 08:11

## 2023-09-05 RX ADMIN — MIDAZOLAM 1 MG: 1 INJECTION INTRAMUSCULAR; INTRAVENOUS at 08:00

## 2023-09-05 RX ADMIN — MIDAZOLAM 1 MG: 1 INJECTION INTRAMUSCULAR; INTRAVENOUS at 07:44

## 2023-09-05 RX ADMIN — BUPIVACAINE HYDROCHLORIDE 20 ML: 2.5 INJECTION, SOLUTION EPIDURAL; INFILTRATION; INTRACAUDAL; PERINEURAL at 08:00

## 2023-09-05 RX ADMIN — BUPIVACAINE 20 ML: 13.3 INJECTION, SUSPENSION, LIPOSOMAL INFILTRATION at 08:00

## 2023-09-05 RX ADMIN — FENTANYL CITRATE 50 MCG: 50 INJECTION, SOLUTION INTRAMUSCULAR; INTRAVENOUS at 15:24

## 2023-09-05 RX ADMIN — METHOCARBAMOL 500 MG: 500 TABLET, FILM COATED ORAL at 21:04

## 2023-09-05 RX ADMIN — FENTANYL CITRATE 50 MCG: 50 INJECTION, SOLUTION INTRAMUSCULAR; INTRAVENOUS at 08:48

## 2023-09-05 RX ADMIN — LIDOCAINE HYDROCHLORIDE 100 MG: 20 INJECTION, SOLUTION INFILTRATION; PERINEURAL at 08:45

## 2023-09-05 RX ADMIN — ONDANSETRON 4 MG: 2 INJECTION INTRAMUSCULAR; INTRAVENOUS at 16:04

## 2023-09-05 RX ADMIN — FENTANYL CITRATE 50 MCG: 50 INJECTION, SOLUTION INTRAMUSCULAR; INTRAVENOUS at 16:29

## 2023-09-05 RX ADMIN — MIDAZOLAM 1 MG: 1 INJECTION INTRAMUSCULAR; INTRAVENOUS at 07:48

## 2023-09-05 RX ADMIN — FENTANYL CITRATE 50 MCG: 50 INJECTION, SOLUTION INTRAMUSCULAR; INTRAVENOUS at 10:02

## 2023-09-05 RX ADMIN — FENTANYL CITRATE 50 MCG: 50 INJECTION, SOLUTION INTRAMUSCULAR; INTRAVENOUS at 13:05

## 2023-09-05 RX ADMIN — PROPOFOL 150 MG: 10 INJECTION, EMULSION INTRAVENOUS at 08:45

## 2023-09-05 RX ADMIN — Medication 20 MG: at 14:02

## 2023-09-05 ASSESSMENT — ACTIVITIES OF DAILY LIVING (ADL)
ADLS_ACUITY_SCORE: 20
ADLS_ACUITY_SCORE: 22
ADLS_ACUITY_SCORE: 20

## 2023-09-05 NOTE — ANESTHESIA CARE TRANSFER NOTE
Patient: Doretha Yu    Procedure: Procedure(s):  COMPLETION PROCTECTOMY, ROBOT-ASSISTED, ILEAL POUCH ANASTAMOSIS  Sigmoidoscopy flexible       Diagnosis: Ulcerative pancolitis with other complication (H) [K51.018]  Diagnosis Additional Information: No value filed.    Anesthesia Type:   General     Note:    Oropharynx: oropharynx clear of all foreign objects  Level of Consciousness: awake  Oxygen Supplementation: nasal cannula  Level of Supplemental Oxygen (L/min / FiO2): 6  Independent Airway: airway patency satisfactory and stable  Dentition: dentition unchanged S/P dental procedure  Vital Signs Stable: post-procedure vital signs reviewed and stable  Report to RN Given: handoff report given  Patient transferred to: PACU  Comments: Pt remains stable, monitors on alarms in place, report to PACU RN, no complications  Handoff Report: Identifed the Patient, Identified the Reponsible Provider, Reviewed the pertinent medical history, Discussed the surgical course, Reviewed Intra-OP anesthesia mangement and issues during anesthesia, Set expectations for post-procedure period and Allowed opportunity for questions and acknowledgement of understanding      Vitals:  Vitals Value Taken Time   /90 09/05/23 1631   Temp     Pulse 107 09/05/23 1638   Resp 14 09/05/23 1638   SpO2 96 % 09/05/23 1638   Vitals shown include unvalidated device data.    Electronically Signed By: AMADA Gatica CRNA  September 5, 2023  4:39 PM

## 2023-09-05 NOTE — ANESTHESIA PROCEDURE NOTES
TAP Procedure Note    Pre-Procedure   Staff -        Anesthesiologist:  Edward Escalante MD       Resident/Fellow: Jeffry Spencer MD       Performed By: fellow       Location: pre-op       Procedure Start/Stop Times: 9/5/2023 8:00 AM and 9/5/2023 8:08 AM       Pre-Anesthestic Checklist: patient identified, IV checked, site marked, risks and benefits discussed, informed consent, monitors and equipment checked, pre-op evaluation, at physician/surgeon's request and post-op pain management  Timeout:       Correct Patient: Yes        Correct Procedure: Yes        Correct Site: Yes        Correct Position: Yes        Correct Laterality: Yes        Site Marked: Yes  Procedure Documentation  Procedure: TAP       Diagnosis: POST OPERATIVE PAIN CONTROL       Laterality: bilateral       Patient Position: supine       Patient Prep/Sterile Barriers: sterile gloves, mask       Skin prep: Chloraprep       Needle Type: insulated       Needle Gauge: 21.        Needle Length (Inches): 4        Ultrasound guided       1. Ultrasound was used to identify targeted nerve, plexus, vascular marker, or fascial plane and place a needle adjacent to it in real-time.       2. Ultrasound was used to visualize the spread of anesthetic in close proximity to the above referenced structure.       3. A permanent image is entered into the patient's record.    Assessment/Narrative         The placement was negative for: blood aspirated, painful injection and site bleeding       Paresthesias: No.       Bolus given via needle..        Secured via.        Insertion/Infusion Method: Single Shot       Complications: none    Medication(s) Administered   Bupivacaine 0.25% PF (Infiltration) - Infiltration   20 mL - 9/5/2023 8:00:00 AM  Bupivacaine liposome (Exparel) 1.3% LA inj susp (Infiltration) - Infiltration   20 mL - 9/5/2023 8:00:00 AM  Medication Administration Time: 9/5/2023 8:00 AM      FOR [facility] ([facility] [place]) ONLY:   Pain Team Contact information:  "please page the Pain Team Via [facility]. Search \"Pain\". During daytime hours, please page the attending first. At night please page the resident first.      "

## 2023-09-05 NOTE — ANESTHESIA POSTPROCEDURE EVALUATION
Patient: Doretha Yu    Procedure: Procedure(s):  COMPLETION PROCTECTOMY, ROBOT-ASSISTED, ILEAL POUCH ANASTAMOSIS  Sigmoidoscopy flexible       Anesthesia Type:  General    Note:  Disposition: Admission   Postop Pain Control: Uneventful            Sign Out: Well controlled pain   PONV: No   Neuro/Psych: Uneventful            Sign Out: Acceptable/Baseline neuro status   Airway/Respiratory: Uneventful            Sign Out: Acceptable/Baseline resp. status   CV/Hemodynamics: Uneventful            Sign Out: Acceptable CV status; No obvious hypovolemia; No obvious fluid overload   Other NRE: NONE   DID A NON-ROUTINE EVENT OCCUR? No    Event details/Postop Comments:  No complications.           Last vitals:  Vitals Value Taken Time   /91 09/05/23 1800   Temp 36.9  C (98.5  F) 09/05/23 1630   Pulse 91 09/05/23 1806   Resp 5 09/05/23 1806   SpO2 98 % 09/05/23 1806   Vitals shown include unvalidated device data.    Electronically Signed By: Ismael Velazquez MD  September 5, 2023  6:07 PM

## 2023-09-05 NOTE — OR NURSING
PATIENT IS RESTING WITH EYES CLOSED, NO GRIMACING NOTED, PT IS SNORING. PT ROUSES EASILY AND THEN STATES THAT HER PAIN IS SEVERE, BUT FALLS BACK ASLEEP IN LESS THAN A MINUTE AND BEGINS SNORING AGAIN.   PATIENT HAS PCA BUTTON IN HER HAND AND IS ENCOURAGED TO USE IT WHEN SHE HAS PAIN. ANESTHESIA PAGED.

## 2023-09-05 NOTE — OR NURSING
BEHAVIORAL AND NUMERICAL RATING OF PAIN EXPLAINED TO ANESTHESIA AND PATIENT HAS BEEN CLEARED TO TRANSFER TO THE FLOOR. PATIENT CONTINUES TO REST WITH EYES CLOSED AND NO GRIMACING NOTED, RESP REGULAR, BUT PATIENT IS SNORING.

## 2023-09-05 NOTE — BRIEF OP NOTE
[facility]    Brief Operative Note    Pre-operative diagnosis: Ulcerative pancolitis with other complication (H) [K51.018]  Post-operative diagnosis Same as pre-operative diagnosis    Procedure: Procedure(s):  COMPLETION PROCTECTOMY, ROBOT-ASSISTED, ILEAL POUCH ANASTAMOSIS  Sigmoidoscopy flexible  Surgeon: Surgeon(s) and Role:     * Quinton Ram MD - Primary     * Gasper Varela MD - Fellow - Assisting     * Madeline Rodriguez MD - Fellow - Assisting  Anesthesia: General with Block   Estimated Blood Loss: 50 mL from 9/5/2023  8:38 AM to 9/5/2023  4:27 PM      Drains: Augustus-El in pelvis   Specimens:   ID Type Source Tests Collected by Time Destination   1 : Ileostomy Trim Tissue Other SURGICAL PATHOLOGY EXAM Quinton Ram MD 9/5/2023 10:49 AM    2 : Rectum Tissue Rectum SURGICAL PATHOLOGY EXAM Quinton Ram MD 9/5/2023  1:50 PM    3 : Anastomotic rings Tissue Large Intestine, Colon SURGICAL PATHOLOGY EXAM Quinton Ram MD 9/5/2023  2:51 PM      Findings:   Flex sig notable for extremely friable rectum, take down of end ileostomy, robotic proctectomy, creation of J-pouch via prior EI site, required only minimal mobilization of J-pouch  .EEA anastomosis, intact donuts, negative leak test. Resiting of DLI to lower quadrant due to reach of ileum. Closed prior ileostomy site / parastomal hernia with interrupted figure-of-eight PDS.   Complications: None.  Implants: * No implants in log *

## 2023-09-05 NOTE — ANESTHESIA PROCEDURE NOTES
Airway       Patient location during procedure: OR       Procedure Start/Stop Times: 9/5/2023 8:47 AM  Staff -        Performed By: CRNAIndications and Patient Condition       Indications for airway management: toy-procedural         Mask difficulty assessment: 1 - vent by mask    Final Airway Details       Final airway type: endotracheal airway       Successful airway: ETT - single  Endotracheal Airway Details        ETT size (mm): 7.0       Cuffed: yes       Successful intubation technique: direct laryngoscopy       DL Blade Type: Yeh 2       Grade View of Cords: 1       Bite block used: None    Post intubation assessment        Placement verified by: capnometry        Ease of procedure: easy       Dentition: Intact    Medication(s) Administered   Medication Administration Time: 9/5/2023 8:47 AM

## 2023-09-06 LAB
ANION GAP SERPL CALCULATED.3IONS-SCNC: 12 MMOL/L (ref 7–15)
ANION GAP SERPL CALCULATED.3IONS-SCNC: 12 MMOL/L (ref 7–15)
BUN SERPL-MCNC: 7.7 MG/DL (ref 6–20)
BUN SERPL-MCNC: 8.7 MG/DL (ref 6–20)
CALCIUM SERPL-MCNC: 8.5 MG/DL (ref 8.6–10)
CALCIUM SERPL-MCNC: 8.8 MG/DL (ref 8.6–10)
CHLORIDE SERPL-SCNC: 106 MMOL/L (ref 98–107)
CHLORIDE SERPL-SCNC: 107 MMOL/L (ref 98–107)
CREAT SERPL-MCNC: 0.78 MG/DL (ref 0.51–0.95)
CREAT SERPL-MCNC: 0.87 MG/DL (ref 0.51–0.95)
DEPRECATED HCO3 PLAS-SCNC: 22 MMOL/L (ref 22–29)
DEPRECATED HCO3 PLAS-SCNC: 22 MMOL/L (ref 22–29)
ERYTHROCYTE [DISTWIDTH] IN BLOOD BY AUTOMATED COUNT: 14.6 % (ref 10–15)
ERYTHROCYTE [DISTWIDTH] IN BLOOD BY AUTOMATED COUNT: 14.7 % (ref 10–15)
GFR SERPL CREATININE-BSD FRML MDRD: 82 ML/MIN/1.73M2
GFR SERPL CREATININE-BSD FRML MDRD: >90 ML/MIN/1.73M2
GLUCOSE SERPL-MCNC: 122 MG/DL (ref 70–99)
GLUCOSE SERPL-MCNC: 138 MG/DL (ref 70–99)
HCT VFR BLD AUTO: 35.8 % (ref 35–47)
HCT VFR BLD AUTO: 36.2 % (ref 35–47)
HGB BLD-MCNC: 11.1 G/DL (ref 11.7–15.7)
HGB BLD-MCNC: 11.1 G/DL (ref 11.7–15.7)
MAGNESIUM SERPL-MCNC: 1.8 MG/DL (ref 1.7–2.3)
MCH RBC QN AUTO: 27.1 PG (ref 26.5–33)
MCH RBC QN AUTO: 27.1 PG (ref 26.5–33)
MCHC RBC AUTO-ENTMCNC: 30.7 G/DL (ref 31.5–36.5)
MCHC RBC AUTO-ENTMCNC: 31 G/DL (ref 31.5–36.5)
MCV RBC AUTO: 87 FL (ref 78–100)
MCV RBC AUTO: 88 FL (ref 78–100)
PHOSPHATE SERPL-MCNC: 4.9 MG/DL (ref 2.5–4.5)
PLATELET # BLD AUTO: 315 10E3/UL (ref 150–450)
PLATELET # BLD AUTO: 345 10E3/UL (ref 150–450)
POTASSIUM SERPL-SCNC: 4.1 MMOL/L (ref 3.4–5.3)
POTASSIUM SERPL-SCNC: 4.5 MMOL/L (ref 3.4–5.3)
RBC # BLD AUTO: 4.1 10E6/UL (ref 3.8–5.2)
RBC # BLD AUTO: 4.1 10E6/UL (ref 3.8–5.2)
SODIUM SERPL-SCNC: 140 MMOL/L (ref 136–145)
SODIUM SERPL-SCNC: 141 MMOL/L (ref 136–145)
WBC # BLD AUTO: 15.5 10E3/UL (ref 4–11)
WBC # BLD AUTO: 19.1 10E3/UL (ref 4–11)

## 2023-09-06 PROCEDURE — 250N000013 HC RX MED GY IP 250 OP 250 PS 637: Performed by: PHYSICIAN ASSISTANT

## 2023-09-06 PROCEDURE — 250N000013 HC RX MED GY IP 250 OP 250 PS 637: Performed by: STUDENT IN AN ORGANIZED HEALTH CARE EDUCATION/TRAINING PROGRAM

## 2023-09-06 PROCEDURE — 250N000011 HC RX IP 250 OP 636: Mod: JZ

## 2023-09-06 PROCEDURE — 120N000002 HC R&B MED SURG/OB UMMC

## 2023-09-06 PROCEDURE — 83735 ASSAY OF MAGNESIUM: CPT | Performed by: STUDENT IN AN ORGANIZED HEALTH CARE EDUCATION/TRAINING PROGRAM

## 2023-09-06 PROCEDURE — 250N000011 HC RX IP 250 OP 636: Mod: JZ | Performed by: PHYSICIAN ASSISTANT

## 2023-09-06 PROCEDURE — 85027 COMPLETE CBC AUTOMATED: CPT | Performed by: STUDENT IN AN ORGANIZED HEALTH CARE EDUCATION/TRAINING PROGRAM

## 2023-09-06 PROCEDURE — 99255 IP/OBS CONSLTJ NEW/EST HI 80: CPT | Performed by: PHYSICIAN ASSISTANT

## 2023-09-06 PROCEDURE — 84100 ASSAY OF PHOSPHORUS: CPT | Performed by: STUDENT IN AN ORGANIZED HEALTH CARE EDUCATION/TRAINING PROGRAM

## 2023-09-06 PROCEDURE — G0463 HOSPITAL OUTPT CLINIC VISIT: HCPCS

## 2023-09-06 PROCEDURE — 80048 BASIC METABOLIC PNL TOTAL CA: CPT | Performed by: STUDENT IN AN ORGANIZED HEALTH CARE EDUCATION/TRAINING PROGRAM

## 2023-09-06 PROCEDURE — 258N000003 HC RX IP 258 OP 636: Performed by: STUDENT IN AN ORGANIZED HEALTH CARE EDUCATION/TRAINING PROGRAM

## 2023-09-06 PROCEDURE — 250N000013 HC RX MED GY IP 250 OP 250 PS 637

## 2023-09-06 PROCEDURE — 36415 COLL VENOUS BLD VENIPUNCTURE: CPT | Performed by: STUDENT IN AN ORGANIZED HEALTH CARE EDUCATION/TRAINING PROGRAM

## 2023-09-06 RX ORDER — ACETAMINOPHEN 325 MG/1
975 TABLET ORAL EVERY 6 HOURS
Status: DISCONTINUED | OUTPATIENT
Start: 2023-09-06 | End: 2023-09-11 | Stop reason: HOSPADM

## 2023-09-06 RX ORDER — KETOROLAC TROMETHAMINE 30 MG/ML
30 INJECTION, SOLUTION INTRAMUSCULAR; INTRAVENOUS EVERY 6 HOURS PRN
Status: DISCONTINUED | OUTPATIENT
Start: 2023-09-06 | End: 2023-09-06

## 2023-09-06 RX ORDER — HYDROXYZINE HYDROCHLORIDE 50 MG/1
50 TABLET, FILM COATED ORAL EVERY 6 HOURS PRN
Status: DISCONTINUED | OUTPATIENT
Start: 2023-09-06 | End: 2023-09-11 | Stop reason: HOSPADM

## 2023-09-06 RX ORDER — ENOXAPARIN SODIUM 100 MG/ML
40 INJECTION SUBCUTANEOUS EVERY 24 HOURS
Status: DISCONTINUED | OUTPATIENT
Start: 2023-09-06 | End: 2023-09-11 | Stop reason: HOSPADM

## 2023-09-06 RX ORDER — HYDROXYZINE HYDROCHLORIDE 25 MG/1
25 TABLET, FILM COATED ORAL EVERY 6 HOURS PRN
Status: DISCONTINUED | OUTPATIENT
Start: 2023-09-06 | End: 2023-09-11 | Stop reason: HOSPADM

## 2023-09-06 RX ORDER — METHOCARBAMOL 750 MG/1
750 TABLET, FILM COATED ORAL 4 TIMES DAILY
Status: DISCONTINUED | OUTPATIENT
Start: 2023-09-07 | End: 2023-09-07

## 2023-09-06 RX ORDER — METHOCARBAMOL 500 MG/1
500 TABLET, FILM COATED ORAL 4 TIMES DAILY
Status: DISCONTINUED | OUTPATIENT
Start: 2023-09-06 | End: 2023-09-06

## 2023-09-06 RX ORDER — HYDROMORPHONE HYDROCHLORIDE 2 MG/1
2-4 TABLET ORAL
Status: DISCONTINUED | OUTPATIENT
Start: 2023-09-06 | End: 2023-09-07

## 2023-09-06 RX ORDER — METHOCARBAMOL 750 MG/1
750 TABLET, FILM COATED ORAL 4 TIMES DAILY
Status: DISCONTINUED | OUTPATIENT
Start: 2023-09-06 | End: 2023-09-06

## 2023-09-06 RX ORDER — HYDROMORPHONE HCL IN WATER/PF 6 MG/30 ML
.2-.4 PATIENT CONTROLLED ANALGESIA SYRINGE INTRAVENOUS
Status: DISCONTINUED | OUTPATIENT
Start: 2023-09-06 | End: 2023-09-07

## 2023-09-06 RX ADMIN — HYDROMORPHONE HYDROCHLORIDE 2 MG: 2 TABLET ORAL at 12:18

## 2023-09-06 RX ADMIN — HYDROMORPHONE HYDROCHLORIDE 0.4 MG: 0.2 INJECTION, SOLUTION INTRAMUSCULAR; INTRAVENOUS; SUBCUTANEOUS at 12:52

## 2023-09-06 RX ADMIN — HYDROMORPHONE HYDROCHLORIDE 4 MG: 2 TABLET ORAL at 19:27

## 2023-09-06 RX ADMIN — METHOCARBAMOL 500 MG: 500 TABLET, FILM COATED ORAL at 20:29

## 2023-09-06 RX ADMIN — METHOCARBAMOL 750 MG: 750 TABLET ORAL at 08:49

## 2023-09-06 RX ADMIN — ACETAMINOPHEN 975 MG: 325 TABLET, FILM COATED ORAL at 05:25

## 2023-09-06 RX ADMIN — HYDROMORPHONE HYDROCHLORIDE 0.4 MG: 0.2 INJECTION, SOLUTION INTRAMUSCULAR; INTRAVENOUS; SUBCUTANEOUS at 17:28

## 2023-09-06 RX ADMIN — VENLAFAXINE 150 MG: 75 TABLET ORAL at 08:49

## 2023-09-06 RX ADMIN — HYDROXYZINE HYDROCHLORIDE 50 MG: 50 TABLET ORAL at 23:52

## 2023-09-06 RX ADMIN — FAMOTIDINE 20 MG: 20 TABLET ORAL at 20:29

## 2023-09-06 RX ADMIN — KETOROLAC TROMETHAMINE 30 MG: 30 INJECTION, SOLUTION INTRAMUSCULAR; INTRAVENOUS at 02:52

## 2023-09-06 RX ADMIN — METHOCARBAMOL 500 MG: 500 TABLET, FILM COATED ORAL at 11:36

## 2023-09-06 RX ADMIN — SODIUM CHLORIDE, POTASSIUM CHLORIDE, SODIUM LACTATE AND CALCIUM CHLORIDE: 600; 310; 30; 20 INJECTION, SOLUTION INTRAVENOUS at 05:22

## 2023-09-06 RX ADMIN — SODIUM CHLORIDE, POTASSIUM CHLORIDE, SODIUM LACTATE AND CALCIUM CHLORIDE: 600; 310; 30; 20 INJECTION, SOLUTION INTRAVENOUS at 17:41

## 2023-09-06 RX ADMIN — HYDROMORPHONE HYDROCHLORIDE 2 MG: 2 TABLET ORAL at 09:47

## 2023-09-06 RX ADMIN — ENOXAPARIN SODIUM 40 MG: 40 INJECTION SUBCUTANEOUS at 14:06

## 2023-09-06 RX ADMIN — FAMOTIDINE 20 MG: 20 TABLET ORAL at 08:49

## 2023-09-06 RX ADMIN — ACETAMINOPHEN 975 MG: 325 TABLET, FILM COATED ORAL at 11:36

## 2023-09-06 RX ADMIN — VENLAFAXINE 150 MG: 75 TABLET ORAL at 20:28

## 2023-09-06 RX ADMIN — HYDROMORPHONE HYDROCHLORIDE 4 MG: 2 TABLET ORAL at 22:27

## 2023-09-06 RX ADMIN — METHOCARBAMOL 500 MG: 500 TABLET, FILM COATED ORAL at 16:22

## 2023-09-06 RX ADMIN — ACETAMINOPHEN 975 MG: 325 TABLET, FILM COATED ORAL at 23:09

## 2023-09-06 RX ADMIN — HYDROMORPHONE HYDROCHLORIDE 2 MG: 2 TABLET ORAL at 16:22

## 2023-09-06 RX ADMIN — HYDROXYZINE HYDROCHLORIDE 25 MG: 50 TABLET ORAL at 14:06

## 2023-09-06 RX ADMIN — ACETAMINOPHEN 975 MG: 325 TABLET, FILM COATED ORAL at 17:27

## 2023-09-06 RX ADMIN — HYDROMORPHONE HYDROCHLORIDE 0.4 MG: 0.2 INJECTION, SOLUTION INTRAMUSCULAR; INTRAVENOUS; SUBCUTANEOUS at 23:55

## 2023-09-06 ASSESSMENT — ACTIVITIES OF DAILY LIVING (ADL)
ADLS_ACUITY_SCORE: 22
ADLS_ACUITY_SCORE: 22
ADLS_ACUITY_SCORE: 28
ADLS_ACUITY_SCORE: 22
DEPENDENT_IADLS:: INDEPENDENT
ADLS_ACUITY_SCORE: 22
ADLS_ACUITY_SCORE: 22
ADLS_ACUITY_SCORE: 28
ADLS_ACUITY_SCORE: 22
ADLS_ACUITY_SCORE: 28
ADLS_ACUITY_SCORE: 22

## 2023-09-06 NOTE — PLAN OF CARE
Pt A/Ox4, VSS on RA, on capno. Clear liquid diet, tolerating sips of water and meds, denies nausea. Uncontrolled pain, provider aware. Encouraged to use PCA dilaudid. R PIV with PCA and LR. L SHAY, leaking at site, marked. Previous ileostomy site dressing with drainage, provider aware and states this is likely iodine. New ileostomy site with red fluid in bag. Lap sites x3 YADIRA. Major with adequate urine output. Pt declined turning, getting out of bed. Provider aware of poor pain control; awaiting lab results and then will order Toradol.

## 2023-09-06 NOTE — OP NOTE
Procedure Date: 9/5/2023     PREOPERATIVE DIAGNOSIS:  Chronic ulcerative pancolitis refractory to medical therapy (status post total abdominal colectomy with end ileostomy).     POSTOPERATIVE DIAGNOSIS:  Chronic ulcerative pancolitis refractory to medical therapy (status post total abdominal colectomy with end ileostomy).      ANESTHESIA:  General endotracheal anesthesia plus bilateral TAP blocks.     PROCEDURES PERFORMED:    1.  Laparoscopic robotic-assisted completion proctectomy.  2.  Takedown of end ileostomy  3.  Creation of ileal J-pouch with ileoanal anastomosis   4.  Flexible pouchoscopy/sigmoidoscopy.  5.  Diverting loop ileostomy creation       SURGEON:  Quinton Ram MD, PhD.     ASSISTANT:  Madeline Rodriguez MD, and Gasper Varela MD (Colon and Rectal Surgery fellows)     Bedside assistant: Mariela Crandall PA-C.  Ms Raz's assistance was required at the bedside for instrument exchanges and well as tissue retraction and suture passing.  This allowed Otoniel Rodriguez and Avery to be at the robotic consoles and receive much greater educational/training benefit.     BRIEF HISTORY:  Doretha Yu is a 48-year-old lady who underwent total abdominal colectomy with end ileostomy by me in June 2021 for ulcerative pancolitis.  Thankfully, she recovered very well from this.  She has lost over 60 lbs in the past year and now is a much more appropriate candidate for this elective procedure.  Her ileostomy was functioning well with and she denied any pouching issues.  She denied any previous anorectal operations.  We had a long discussion with regards to a restorative proctocolectomy versus end ileostomy.  She elected to proceed with an ileal J-pouch.  I thoroughly discussed the risks, benefits and alternatives of operative treatment with Doretha, and she agreed to proceed.     DESCRIPTION OF OPERATION:  After obtaining informed consent, the patient was brought to the operating room and placed in the modified lithotomy  "position.  General endotracheal anesthesia was gently induced without difficulty.  She underwent preoperative placement of bilateral TAP blocks.  She also received appropriate preoperative antibiotic prophylaxis as well as mechanical and chemical DVT prophylaxis.  Bilateral lower extremity pneumatic compression devices were applied, and all pressure points were cushioned.  A Major catheter was inserted.      We performed a time-out and I performed a flexible sigmoidoscopy.  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  It is unclear if there was a component of active ulcerative proctitis as well.    The abdomen was prepped and draped in the standard sterile fashion.  A \"timeout\" was performed.       An 8 mm supra-umbilical incision was made and the Ellen port was placed.  The abdomen was insufflated to 15 mmHg.  The robotic camera was then inserted.  There were few, if any, adhesions.  We were able to place an additional left sided 8 mm port under direct vision.  We placed an additional port (8 mm) on the right  in addition to an 8 mm AirSeal port in the right upper quadrant.  The mesentery of the small bowel was very mobile and I did not anticipate it causing us issues with reach of the ileal pouch into the pelvis.  We then turned our attention to the end ileostomy.     A circular incision was placed around the ileostomy and this was dissected down into the peritoneal cavity.  After it was completely freed from abdominal wall attachments we brought an ample amount of small bowel out through the ostomy site.  We were able to get this to reach to the pubis, thus further convincing us that IPAA creation was feasible.      A Gel Port was placed in the former ileostomy site. A 12 mm robotic port and an additional assist port were placed through this site.     Pneumoperitoneum was established up to 15 mmHg without difficulty.  The patient was placed in Trendelenberg position.  The [employer] robot " "was docked, and all robotic instruments were placed under direct vision.  The uterus was large and we sewed it to the anterior abdominal wall with three 0 vicryl sutures to keep it out of the way.  After this, the small bowel was retracted to the upper abdomen and completion proctectomy was performed with circumferential dissection all the way down to the pelvic floor.  Left and right ureters were clearly identified during this dissection and care was taken to not injure them. Once we had reached the pelvic floor, we performed a rectal exam to assure that we were just above the anal canal.  The mesorectum was very inflamed and took about 3 hours to dissect.  I confirmed with digital exam that we were transecting at a reasonable level with just enough (about 1 cm) of rectal cuff remaining for the stapled anastomosis.  We then transected the rectum about 1-2 cm proximal to the an al verge with a robotic stapler (45 mm -- green load).  This was performed with 3 firings of the stapler.  The rectum was placed in a 15 cm endocatch bag.    After this the rectum was removed through the former ileostomy site.  It was sent to pathology.  We then brought out the small bowel through the old ileostomy site to creat the J pouch.     We used the 100 mm NEHAL blue load stapler to transect 1 cm or so of the distal ileum and sent this to pathology as \"ileostomy trim\".  We selected a spot to the ileum that was at least 15 cm long for our ileal J-pouch.  Three stay sutures were placed at the proximal portion of the pouch.  An antimesenteric enterotomy was placed and a 15 cm long ileal j-pouch was constructed with one firing of the 100 mm NEHAL blue load and one firing of the endo-NEHAL 60 mm blue load (the laparoscopic stapler).  The entire \"common\" staple line of the J pouch was then oversewn in interrupted Lembert fashion with 3-0 PDS suture.  A 29 mm EEA anvil was then sewn into our enterotomy site and fixed in place with a 2-0 Prolene " pursestring suture.  The pouch was then returned to the peritoneal cavity and re-insufflated.      The anvil was then brought all the way down to the rectal stump.  This reached easily without undo tension.  The anus was then gently dilated and a 29 mm EEA stapler was passed transanally.  It was clear that the stapler was passed the anal sphincter complex.  The post was delivered just adjacent to one of the previous robotic staple lines and the anvil was connected to the post.  There was absolutely no evidence of tension, torsion, or inadequate blood supply to the ileoanal anastomosis.  We made sure it wasn't twisted and that it went straight down into the pelvis with no twisting of the mesentery multiple times.  The stapler was then fired and then withdrawn.  Both anastomotic rings were intact and these were sent for permanent pathology.  Flexible pouchoscopy was then performed with a flexible sigmoidoscope and there was no evidence of bleeding inside the ileal J-pouch.  Furthermore, hydropneumatic testing was negative.  The mucosa of the entire ileal J-pouch appeared to be healthy.  The flexible sigmoidoscope was then removed. The abdomen and pelvis were then irrigated with sterile water and subsequently suctioned out.       A 19-Chinese Ralf drain was left in the pelvis and brought through one of the left sided trocar sites.  The 12 mm right lower quadrant port was closed with 0 vicryl and the PMI suture passer device under direct vision.  We then selected a segment of ileum that was approximately 20-25 cm proximal to the J-pouch for our loop ileostomy.      This did not reach her former ileostomy site, which was in the right upper quadrant.  We closed the former ileostomy site with #1 PDS suture in several figure-of-eight sutures.  We partially closed the skin at this site with 0 vicryl suture.  The site was packed with betadyne soaked kerlix.    The new ileostomy site was chosen in the right lower quadrant.  A  circular incision in the skin was made and we dissected down to fascia.  We made a linear incision in anterior fascia and then  out the rectus muscle fibers.  We sharply entered the peritoneal cavity through the posterior fascia.  The small bowel easily came out through this site.    Instrument, sponge, and needle counts were all correct as reported to me.  All instruments and trocars were removed under direct vision.  The right sided 12 mm port site was then also closed with 0 vicryl and the PMI suture passer.  Pneumoperitoneum was desufflated.  The trocar sites were closed at the skin level with 4-0 Monocryl subcuticular sutures and skin glue.  The Ralf drain was fixated to the skin with a 2-0 nylon suture. The segment of ileum that was brought through the prior ileostomy site was oriented correctly and matured as a loop ileostomy with multiple 3-0 Vicryl sutures.  A sterile stoma appliance was then placed.  Instrument, sponge, and needle counts were all correct as reported to me.  The patient tolerated the procedure well.     COMPLICATIONS:  None immediately.     ESTIMATED BLOOD LOSS:  50 mL.     DRAINS AND TUBES:  A 19-Yoruba Ralf drain in the pelvis and Major catheter.     SPECIMENS:  Ileostomy trim, rectum, and anastomotic rings     FINDINGS:  A 29 mm EEA double stapled ileoanal anastomosis.  Negative hydropneumatic testing.     DISPOSITION:  PACU.     Quinton Ram MD, PhD

## 2023-09-06 NOTE — PROGRESS NOTES
"  Colorectal Surgery Progress Note  Surgery Cross-Cover  Post Op Check    09/05/2023    Doretha Yu is a 48 year old female POD#0 s/p Procedure(s):  COMPLETION PROCTECTOMY, ROBOT-ASSISTED, ILEAL POUCH ANASTAMOSIS  Sigmoidoscopy flexible for Pre-Op Diagnosis Codes:     * Ulcerative pancolitis with other complication (H) [K51.018]    Pt reports their pain is difficult to control. Denies nausea, SOB, chest pain, or dizziness. Patient is voiding via urinary catheter    /83 (BP Location: Right arm)   Pulse 87   Temp 98.9  F (37.2  C) (Oral)   Resp 17   Ht 1.6 m (5' 3\")   Wt 95.2 kg (209 lb 14.1 oz)   SpO2 97%   BMI 37.18 kg/m      Gen: A&O x4, NAD   Chest: breathing non-labored on room air    Abdomen: soft, tender to palpation, non-distended. Stoma pink and viable, serosanguinous drainage in collection bag  Incision: clean, dry, intact covered by dressings  Extremities: warm and well perfused  Devices: SHAY drain in place    A/P: CBC and BMP. Pain management. Discussed with colorectal fellow on call. Continue plan of care per primary team. Please call with any questions.    Lorene Blas, DO    -----------------------------------------------------------------------------------------------    Hgb stable at 11.1 (from 12.2).    A/P: start Toradol prn for additional pain control    Hayde Marshall, DO  General Surgery, PGY-1   "

## 2023-09-06 NOTE — PROVIDER NOTIFICATION
"Provider notified: Hayde Marshall    \"Pt stating her PCA pump isnt relieving her pain. She is wondering if we can increase the dose a bit.\"  "

## 2023-09-06 NOTE — PHARMACY-ADMISSION MEDICATION HISTORY
Admission Medication History Completed by Pharmacy    See [facility] Admission Navigator for allergy information, preferred outpatient pharmacy, prior to admission medications and immunization status.     Medication History Sources:   Patient, chart    Changes made to PTA medication list (reason):  Added: None  Deleted: calcium-vit D, Tums, Vit D, Vit B12, Diclofenac gel, loperamide  Changed: edited comment on medical cannabis-- vape form in the evenings as needed, patient no longer uses albuterol    Additional Information:  Patient states she has not taken any vitamins listed for the past year    Prior to Admission medications    Medication Sig Last Dose Taking? Auth Provider Long Term End Date   acetaminophen (TYLENOL) 325 MG tablet Take 3 tablets (975 mg) by mouth every 8 hours As scheduled for the next 7-10 days, then decrease both dose & frequency to as needed there after. Past Week Yes Judi Sanabria PA-C     medical cannabis (Patient's own supply) See Admin Instructions (The purpose of this order is to document that the patient reports taking medical cannabis.  This is not a prescription, and is not used to certify that the patient has a qualifying medical condition.) Past Week Yes Reported, Patient     metFORMIN (GLUCOPHAGE) 500 MG tablet TAKE 2 TABLETS BY MOUTH 2 TIMES DAILY WITH MEALS  Patient taking differently: Take 500 mg by mouth 2 times daily (with meals) TAKE 2 TABLETS BY MOUTH 2 TIMES DAILY WITH MEALS Past Week at 0700 Yes Anna Naidu MD Yes    ondansetron (ZOFRAN ODT) 4 MG ODT tab Take 1 tablet (4 mg) by mouth every 8 hours as needed for nausea or vomiting  Patient taking differently: Take 4 mg by mouth as needed for nausea or vomiting Past Week Yes Lowell Pérez MD     phentermine (ADIPEX-P) 15 MG capsule Take 15 mg by mouth every morning Past Week Yes Reported, Patient     venlafaxine (EFFEXOR) 100 MG tablet Take 1 tablet (100 mg) by mouth 2 times daily Total of 150 mg bid Past Week  Yes Anna Naidu MD Yes    venlafaxine (EFFEXOR) 50 MG tablet Take 1 tablet (50 mg) by mouth 2 times daily Total of 150 mg twice daily Past Week Yes Anna Naidu MD Yes    albuterol (PROAIR HFA/PROVENTIL HFA/VENTOLIN HFA) 108 (90 Base) MCG/ACT inhaler Inhale 2 puffs into the lungs every 4 hours as needed for shortness of breath / dyspnea   Ollie Cuevas MD Yes    Ostomy Supplies MISC 20 each daily   Jena Thompson APRN CNP         Date completed: 09/06/23    Medication history completed by: Yolette Sanderson [facility]

## 2023-09-06 NOTE — PROGRESS NOTES
Admitted/transferred from: PACU  2 RN skin assessment completed by Kathryn Medellin, TEJINDER and Mary Herbert, TEJINDER.  Skin assessment finding: lap sites, stitch on back, former takedown site, ileostomy, Jpx1, lacy, PIV  Interventions/actions: continue to monitor    Will continue to monitor.

## 2023-09-06 NOTE — PROVIDER NOTIFICATION
"Provider notified: Lorene Blas    \"Pt's labs resulted, OK to order toradol for extra pain control?  "

## 2023-09-06 NOTE — CONSULTS
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 She is now s/p robotic proctectomy, ileostomy takedown, and creation of ileal J-pouch on 9/5/23.    Pain service was consulted to assist with pain management.  She is opioid naive at baseline.    She has chronic peripheral neuropathy, arthritis and fibromyalgia for which she uses medical cannabis-vapors for pain relief (certified by rheumatology at the [facility]).    Doretha states her pain is across the abdomen, more so on the right.  Pain is \"excruciating\" with movements.  She is using the PCA Dilaudid 0.2mg per dose but states she falls asleep for 5-45minutes and wakes up in pain.  We discussed using oral dilaudid with IV dilaudid for longer lasting pain relief which she agrees.  We discussed indications, risks and benefits of the pain regimen.        Plan:   Discontinue PCA Dilaudid.  (Averaged 0.2mg/hour).  not providing long lasting pain relief, falls asleep after a dose and wakes up in pain.  Start Dilaudid 2-4mg PO Q 3 hours PRN.  (Opioid naive prior to admission)  Start Dilaudid 0.2-0.4mg IV Q 2 hours PRN.  Use oral Dilaudid first.  Robaxin 500mg PO Q 6 " hours PRN.  Continue acetaminophen.  Menthol patches as needed.  Bowel regimen.  Wants to avoid additional medications, avoiding gabapentin and pregabalin due to weight gain / ineffectiveness.    Thank you for the opportunity to participate in the care of Doretha Yu  Pain Service will continue to follow.    Discussed with attending anesthesiologist  Primary Service Contacted with Recommendations? Yes    Alla Kulkarni PA-C  2023        Per [place]  review pulled from system on 23.     2023 2 Phentermine 15 Mg Capsule 30.00 30 An Aco [id number] [place] ([id number]) 2/2  [place]   2023 2 Phentermine 15 Mg Capsule 30.00 30 An Aco [id number] [place] ([id number]) 1/  [place]   2023 1 Phentermine 15 Mg Capsule 30.00 30 An Aco [id number] [place] ([id number]) 0/2  [place]   2023 2 Phentermine 15 Mg Capsule 30.00 30 An Aco [id number] [place] ([id number]) 0/0  [place]   2023 1 Oxycodone-Acetaminophen 5-325 12.00 2 An Gen [id number] [place] ([id number]) 0/0        Pain Medications/Prescriber: Lemuel Sanford  at the [facility]    Past Medical History:  Past Medical History:   Diagnosis Date    Abnormal MRI     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     Anxiety     Basal cell carcinoma     Cervical high risk HPV (human papillomavirus) test positive 2019    See problem list    Colitis     Depression     Diabetes mellitus, iatrogenic (H) 2020    Esophageal reflux     Inflammatory arthritis     Insomnia     Intestinal giardiasis 2018    Lumbago     left lower back pain    Lymphedema     Malignant melanoma (H)     Melanoma (H) 10/23/2017    Migraines     Mild persistent asthma     Morbid obesity with BMI of 40.0-44.9, adult (H)     STORMY (obstructive sleep apnea)     Prothrombin deficiency (H)     takes 81mg asa daily    Stroke (cerebrum) (H)     During     TIA (transient ischemic attack)     Type 2 diabetes mellitus (H)     Ulcerative  pancolitis (H)          Family History:    Family History   Problem Relation Age of Onset    Cancer Mother 45        lung    Neurologic Disorder Mother         epilepsy    Lipids Father     Gastrointestinal Disease Father         diverticulitis     Depression Father     Colitis Father     Colon Cancer Father     Diverticulitis Father     Depression Sister     Cancer Maternal Grandmother     Blood Disease Maternal Grandmother         lymphoma     Arthritis Maternal Grandmother     Diabetes Maternal Grandmother     Depression Maternal Grandmother     Macular Degeneration Maternal Grandmother     Glaucoma Maternal Grandmother     Diabetes Maternal Grandfather     Cerebrovascular Disease Maternal Grandfather     Blood Disease Maternal Grandfather     Heart Disease Maternal Grandfather     Glaucoma Maternal Grandfather     Cancer Paternal Grandmother     Cancer - colorectal Paternal Grandmother     Colitis Paternal Grandmother     Colon Cancer Paternal Grandmother     Diverticulitis Paternal Grandmother     Respiratory Paternal Grandfather         emphysema     Colitis Paternal Grandfather     Colon Cancer Paternal Grandfather     Diverticulitis Paternal Grandfather     Heart Disease Daughter     Asthma Daughter     Melanoma No family hx of     Anesthesia Reaction No family hx of     Clotting Disorder No family hx of        Social History:  Social History     Tobacco Use    Smoking status: Former     Packs/day: 1.00     Years: 5.00     Pack years: 5.00     Types: Cigarettes     Quit date: 3/20/1998     Years since quittin.4    Smokeless tobacco: Never   Substance Use Topics    Alcohol use: Not Currently         Review of Systems:  Complete ROS reviewed. Unless otherwise noted, all other systems found to be negative.        Laboratory Results:  Recent Labs   Lab Test 23  0657 23  0527 23  0736 21  0544 21  1356   INR  --   --  0.95  --  0.96      < > 354   < > 308   PTT  --   --    "--   --  23   BUN 8.7   < > 9.1   < > 19    < > = values in this interval not displayed.       Allergies:  Allergies   Allergen Reactions    Bee Venom Swelling    Azithromycin Diarrhea    Erythromycin      Other reaction(s): GI intolerance, Vomiting    Fentanyl Other (See Comments)     sweating  sweating    Prochlorperazine Fatigue     Other reaction(s): Other (see comments)  Fatigue    Buspirone      Other reaction(s): GI intolerance  vomiting    Erythrocin Nausea and Vomiting    Gluten Meal      Celiac disease    Topamax [Topiramate]      Made her lethargic    Zithromax [Azithromycin Dihydrate] Diarrhea    Enbrel [Etanercept] Hives and Rash         Current Pain Related Medications:  Medications related to Pain Management (From now, onward)      Start     Dose/Rate Route Frequency Ordered Stop    09/06/23 1125  acetaminophen (TYLENOL) tablet 975 mg         975 mg Oral EVERY 6 HOURS 09/06/23 0611      09/06/23 0800  methocarbamol (ROBAXIN) tablet 750 mg         750 mg Oral 4 TIMES DAILY 09/06/23 0638      09/06/23 0700  HYDROmorphone (DILAUDID) PCA 0.2 mg/mL OPIOID NAIVE (age less than 65 years)          Intravenous CONTINUOUS 09/06/23 0637      09/06/23 0636  hydrOXYzine (ATARAX) tablet 25 mg        See Hyperspace for full Linked Orders Report.    25 mg Oral EVERY 6 HOURS PRN 09/06/23 0636      09/06/23 0636  hydrOXYzine (ATARAX) tablet 50 mg        See Hyperspace for full Linked Orders Report.    50 mg Oral EVERY 6 HOURS PRN 09/06/23 0636      09/05/23 1902  lidocaine 1 % 0.1-1 mL         0.1-1 mL Other EVERY 1 HOUR PRN 09/05/23 1902      09/05/23 1902  lidocaine (LMX4) cream          Topical EVERY 1 HOUR PRN 09/05/23 1902      09/05/23 0650  BUPivacaine liposome (EXPAREL) LA inj was given in the infiltration site to produce post-op analgesia. Duration of action is up to 72 hours. Other \"aguilar\" meds should not be given for 96 hours except for lidocaine 4% patch. This is for INFORMATION ONLY.          Does not " "apply CONTINUOUS PRN 09/05/23 0650 09/09/23 0649              Physical Exam:  Vitals: BP 99/55   Pulse 96   Temp 98.4  F (36.9  C) (Oral)   Resp 16   Ht 1.6 m (5' 3\")   Wt 95.2 kg (209 lb 14.1 oz)   SpO2 96%   BMI 37.18 kg/m      Physical Exam:     CONSTITUTIONAL/GENERAL APPEARANCE:  tearful, anxious  EYES: EOMI, sclera anicteric, PERRLA  ENT/NECK: atraumatic, lips and oral mucous membranes dry  RESPIRATORY: non-labored breathing. No cough, wheeze  CV:HR within normal limits  ABDOMEN: round, ostomy bag present with output.  MUSCULOSKELETAL/BACK/SPINE/EXTREMITIES: Moves all extremities purposefully.    NEURO: Alert and Oriented x3. Answers questions appropriately  SKIN/VASCULAR EXAM:  No jaundice, no visible rashes or lesions        Billing on time: 60 minutes spent on chart review, face to face with patient, discuss plan of care with  primary service and documenting.       Acute Inpatient Pain Service [facility]  Hours of pain coverage 24/7   Page via Amcom- Please Page the Pain Team Via Amcom: \"PAIN MANAGEMENT ACUTE INPATIENT/ [facility]\"           "

## 2023-09-06 NOTE — CONSULTS
Care Management Initial Consult    General Information  Assessment completed with: Patient,    Type of CM/SW Visit: Initial Assessment  Primary Care Provider verified and updated as needed: Yes   Readmission within the last 30 days: no previous admission in last 30 days      Reason for Consult: discharge planning  Advance Care Planning: Advance Care Planning Reviewed: present on chart       Communication Assessment  Patient's communication style: spoken language (English or Bilingual)    Hearing Difficulty or Deaf: no   Wear Glasses or Blind: no    Cognitive  Cognitive/Neuro/Behavioral: WDL  Level of Consciousness: lethargic  Arousal Level: arouses to voice, arouses to touch/gentle shaking  Orientation: disoriented to, time  Mood/Behavior: anxious     Speech: slow    Living Environment:   People in home: child(gold), dependent     Current living Arrangements: house      Able to return to prior arrangements: yes    Family/Social Support:  Care provided by: self, child(gold)  Provides care for: child(gold)  Marital Status:   Parent(s), Children, Sibling(s)          Description of Support System: Supportive    Support Assessment: Adequate family and caregiver support, Adequate social supports    Current Resources:   Patient receiving home care services: No  Community Resources: None  Equipment currently used at home: none  Supplies currently used at home: None    Employment/Financial:  Employment Status: employed full-time     Financial Concerns: No concerns identified   Referral to Financial Worker: No    Does the patient's insurance plan have a 3 day qualifying hospital stay waiver?  Yes   Will the waiver be used for post-acute placement? No    Lifestyle & Psychosocial Needs: (pulled from chart)  Social Determinants of Health     Tobacco Use: Medium Risk (9/5/2023)    Patient History     Smoking Tobacco Use: Former     Smokeless Tobacco Use: Never     Passive Exposure: Not on file   Alcohol Use: Not on file    Financial Resource Strain: High Risk (6/28/2021)    Overall Financial Resource Strain (CARDIA)     Difficulty of Paying Living Expenses: Very hard   Food Insecurity: No Food Insecurity (6/28/2021)    Hunger Vital Sign     Worried About Running Out of Food in the Last Year: Never true     Ran Out of Food in the Last Year: Never true   Transportation Needs: No Transportation Needs (6/28/2021)    PRAPARE - Transportation     Lack of Transportation (Medical): No     Lack of Transportation (Non-Medical): No   Physical Activity: Not on file   Stress: Stress Concern Present (6/25/2021)    [employer] - Occupational Stress Questionnaire     Feeling of Stress : Very much   Social Connections: Unknown (6/25/2021)    Social Connection and Isolation Panel [NHANES]     Frequency of Communication with Friends and Family: Not asked     Frequency of Social Gatherings with Friends and Family: Not asked     Attends [employer] Services: Not asked     Active Member of Clubs or Organizations: Not asked     Attends Club or Organization Meetings: Not asked     Marital Status:    Intimate Partner Violence: Unknown (6/25/2021)    Humiliation, Afraid, Rape, and Kick questionnaire     Fear of Current or Ex-Partner: Not asked     Emotionally Abused: Not asked     Physically Abused: Not asked     Sexually Abused: Not asked   Depression: Not at risk (8/22/2023)    PHQ-2     PHQ-2 Score: 0   Recent Concern: Depression - At risk (7/11/2023)    PHQ-2     PHQ-2 Score: 6   Housing Stability: Not on file       Functional Status:  Prior to admission patient needed assistance:   Dependent ADLs:: Independent  Dependent IADLs:: Independent       Mental Health Status:  Mental Health Status: No Current Concerns       Chemical Dependency Status:  Chemical Dependency Status: No Current Concerns             Values/Beliefs:  Spiritual, Cultural Beliefs, [employer] Practices, Values that affect care: no                Additional Information:  Pt is a 48 year old female with complex medical history to include ulcerative colitis s/p TAC with end ileostomy (laparoscopic) on 6/15/21 with subsequent parastomal hernia, inflammatory arthritis secondary to immunotherapy, chronic prednisone use, type 2 diabetes, obesity, history of CVA in 2004 after giving birth, prothrombin deficiency, obstructive sleep apnea, asthma, lymphedema, lumbago, chronic pain, depression, anxiety and insomnia. She is now s/p robotic proctectomy, ileostomy takedown, and creation of ileal J-pouch.     RNCC completed CM assessment due to need for discharge planning. RNCC met with pt at bedside and introduced RNCC role. Pt states she lives with her dependent daughter in a split level home. There are 3 steps to get into the house and 8 to get to main level. Pt states she was fully independent with all ADLs and IADLs before hospitalization and did not use any assistive devices or current home services. Pt states she did have [facility] after her last hospitalization and really liked them. Pt would like to have them again to follow her outpatient this time as well. Pt states she has support from her family. Pt is currently employed full time and has completed [facility] paperwork and is currently on short term disability. Family will provide transportation when medically ready.    RNCC sent home care referral to [facility].    Angel Richey RN BSN  5A RN Care Coordinator   Ph: [phone]  Pager: [phone]

## 2023-09-06 NOTE — PROGRESS NOTES
Colorectal Surgery Progress Note  [facility]  POD#1      Subjective:    Doretha complains of ongoing uncontrolled pain throughout the night. She reports that nothing has helped, though she does endorse dozing off to sleep several times throughout the night. She is tearful and anxious due to pain this morning. She has not yet passed gas, voiding via lacy catheter.    Vitals:  Vitals:    09/06/23 0033 09/06/23 0218 09/06/23 0635 09/06/23 0637   BP:  112/65 97/57 99/55   BP Location:  Right arm Right arm    Cuff Size:       Pulse:  97 96    Resp:  16 16    Temp:  98.5  F (36.9  C) 98.4  F (36.9  C)    TempSrc:  Oral Oral    SpO2: 95% 96% 96%    Weight:       Height:         I/O:  I/O last 3 completed shifts:  In: 3442.5 [I.V.:3442.5]  Out: 1240 [Urine:1050; Drains:140; Blood:50]    Physical Exam:  Gen: AAOx3, NAD  Pulm: Non-labored breathing  Abd: Soft, non-distended, significantly tender globally though particularly in the LLQ   Incision covered by partially saturated, will replace this am   SHAY drain sliding scale output  Ext:  Warm and well-perfused    BMP  Recent Labs   Lab 09/05/23  2348 09/05/23  1727 09/05/23  0830     --   --    POTASSIUM 4.5  --   --    CHLORIDE 107  --   --    CO2 22  --   --    BUN 7.7  --   --    CR 0.78  --   --    * 153* 110*     CBC  Recent Labs   Lab 09/05/23 2348   WBC 19.1*   HGB 11.1*   HCT 35.8            ASSESSMENT:   This is a 48 year old female with complex medical history to include ulcerative colitis s/p TAC with end ileostomy (laparoscopic) on 6/15/21 with subsequent parastomal hernia, inflammatory arthritis secondary to immunotherapy, chronic prednisone use, type 2 diabetes, obesity, history of CVA in 2004 after giving birth, prothrombin deficiency, obstructive sleep apnea, asthma, lymphedema, lumbago, chronic pain, depression, anxiety and insomnia. She is now s/p robotic proctectomy, ileostomy takedown, and creation of ileal  J-pouch. Postoperative course so far complicated by uncontrolled pain.      Neuro/Pain: Increased PCA dilaudid to 0.2mg q10 minutes. Increased APAP to 975mg Q6h. Added atarax PRN as adjunctive. Increased robaxin to 750mg QID. Encouraged to sit up and take sips, will attempt to transition to orals when able.  Pain consult ordered, appreciate recommendations.  PULM: encourage IS.  GI/FEN: Clear liquid diet  : Continue Major until POD2  Heme: Hemoglobin stable post-op, CBC this am pending  ID: Complete periop antibiotics  Activity: as tolerated, encourage sitting up in bed  Dispo: POD3-5 pending ROBF        Patient was seen and discussed with Dr. Rodriguez.    Lowell Bowman MD  General Surgery PGY-1  Colon and Rectal Surgery  Pager: [phone]

## 2023-09-06 NOTE — PLAN OF CARE
3500-8359: A&Ox4, VSS on RA. PCA discontinued. Pt reporting pain unmanaged w/ tylenol, robaxin and prn dilaudid. Provider paged about pain and said they are going to continue w/ the same pain regime. Pt refusing to get out of bed due to pain. WOC nurse in to help patient change ileostomy bag.

## 2023-09-06 NOTE — PLAN OF CARE
"Status 9954-2659    BP 99/55   Pulse 96   Temp 98.4  F (36.9  C) (Oral)   Resp 16   Ht 1.6 m (5' 3\")   Wt 95.2 kg (209 lb 14.1 oz)   SpO2 96%   BMI 37.18 kg/m      A&Ox4. Not OOB this shift, refusing repositioning d/t pain. LR infusing at 75mL/hr. Reports severe abdominal pain despite dilaudid PCA, IV toradol, scheduled Tylenol, and scheduled Robaxin. MD aware, PCA pump to be adjusted for better pain management. SHAY drain and ostomy CDI. Major patent with adequate urine output.     "

## 2023-09-06 NOTE — CONSULTS
[facility] Nurse Inpatient Assessment     Consulted for: new loop ileostomy RLQ      Patient History (according to Colorectal provider note(s) 9/6/23:      This is a 48 year old female with complex medical history to include ulcerative colitis s/p TAC with end ileostomy (laparoscopic) on 6/15/21 with subsequent parastomal hernia, inflammatory arthritis secondary to immunotherapy, chronic prednisone use, type 2 diabetes, obesity, history of CVA in 2004 after giving birth, prothrombin deficiency, obstructive sleep apnea, asthma, lymphedema, lumbago, chronic pain, depression, anxiety and insomnia. She is now s/p robotic proctectomy, ileostomy takedown, and creation of ileal J-pouch on 9/5/23 with Dr Ram.     Per OP note:   Doretha Yu is a 48-year-old lady who underwen total abdominal colectomy with end ileostomy by me in June 2021 for ulcerative pancolitis.  Thankfully, she recovered very well from this.  She has lost over 60 lbs in the past year and now is a much more appropriate candidate for this elective procedure.  Her ileostomy was functioning well with and she denied any pouching issues.  She denied any previous anorectal operations.  We had a long discussion with regards to a restorative proctocolectomy versus end ileostomy.  She elected to proceed with an ileal J-pouch.     Assessment:      Areas visualized during today's visit: Abdomen    Assessment of new loop Ileostomy:  Diagnosis Pertinent to Stoma: Ulcerative Colitis      Surgery Date: 9/5/23  Surgeon: Dr Ram      Hospital: [facility]  Pouching system in place on assessment today: Keith 70mm two piece, cut to fit, and flat   Pouch barrier status:  50% melted- RN thought was leaking but after removing pouch it would have leaked before tomorrow but it was likely the midline that leaked on ostomy pouch  Pouch last changed/wear time: <24 hours  Reason for pouch change today: leakage and routine  "schedule  Effectiveness of current pouching/ supply plan: Attempting new system, will re-evaluate next assessment  Change made with ostomy management today: Yes  Pouching system placed today: [place] 57mm two piece, cut to fit, flat, barrier ring, and skin prep- stretched barrier ring out then pinched together at 3 and 9 O'clock to fill crease  Supplies: gathered, ordered ostomy belt, convex barrier and convex barrier ring, supplies stored on unit, discussed with RN, and discussed with patient (pt has precut barriers that are 1\"- will not fit right now but explained to pt that they may work in 4-6 weeks once swelling goes down)    Last photo: 9/6/23  Stoma location: RLQ (previous site on LLQ would not work so had to be moved)  Stoma size: 1 1/8 inches, slightly oval  Stoma appearance: red, oval, moist, edematous, and protruberant  Mucocutaneous junction:  intact  Peristomal complication(s): none   Output: liquid and brown  Output volume emptied during visit: 0 ml  Abdominal assessment: Distended  Surgical site(s): dressing dry and intact  NG still in place? No  Pain: Cramping and Sharp  Is patient still on a PCA? No    Ostomy education assessment:  Participant of teaching session today: patient   Education completed today: Stoma assessment, Pouching system assessment , Evaluate leakage issue, Adjustment of pouching plan, Pouch change demonstration, Ostomy accessory product use , and Introduction to pouches  Educational materials/methods: Verbal, Demonstration, and Addressed patient/caregiver questions/concerns  Education still needed: Pouch change return demonstration and Discharge instructions, pt has had an ostomy previously- see HPI  Learning Comprehension:   Psychosocial assessment: JUDAH- pt in a lot of pain today but has had an ileostomy in the past which was flat/retracted and she had no difficulty pouching it  Patient readiness for education today: attentive and in significant pain  Following today's visit: " "patient  is able to demonstrate;         1. How to empty their pouch? Yes        2. How to change their pouch? Yes        3. How to read and record intake and output correctly? Yes and with minimal assist- today due to pain  Preparation for discharge completed: No discharge preparation started yet  Preparation for discharge still needed: Placed prescription recommendations in discharge navigator for MD to sign, Ensured patient has extra supplies for discharge, Discussed how to order supplies after discharge , Discussed how and when to make an outpatient WOC nurse appointment after discharge, and Discuss signs/symptoms of when to seek medical attention  Pt support system on discharge: parents  WO recommend home care? No  Face to face time: 30 minutes         Treatment Plan:     RLQ Ileostomy pouching plan:   Pouching system: ostomy supplies pouches: Keith 57 FECAL ([id number]) ostomy supplies barrier: Keith 57mm FLAT ([id number])  Accessories used: [facility] ostomy accessories: 2\" Cera Barrier Ring ([id number]), Large Belt ([id number]), M9 Drops ([id number]), and Cavilon no sting barrier film ([id number])   Frequency of pouch changes: PRN leakage and Three times a week  WOC follow up plan: Daily Monday-Friday (as able)  Bedside RN interventions: Change pouch PRN if leaking using the supplies above, Empty pouch when 1/3 to 1/2 full, ensure to clean pouch outlet after emptying to prevent odor, Notify WOC for ongoing pouch leakage, and Document stoma appearance and output volume, color, and consistency every shift     Orders: Written    RECOMMEND PRIMARY TEAM ORDER: None, at this time  Education provided: plan of care  Discussed plan of care with: Patient and Nurse  WOC nurse follow-up plan: daily M-F  Notify WOC if wound(s) deteriorate.  Nursing to notify the Provider(s) and re-consult the WOC Nurse if new skin concern.    DATA:     Current support surface: Standard  Standard gel/foam mattress (IsoFlex, Atmos air, etc)  Containment of " urine/stool: Incontinent pad in bed and Ileostomy pouch  BMI: Body mass index is 37.18 kg/m .   Active diet order: Orders Placed This Encounter      Low Fiber Diet     Output: I/O last 3 completed shifts:  In: 3456.5 [I.V.:3456.5]  Out: 2220 [Urine:1850; Drains:320; Blood:50]     Labs:   Recent Labs   Lab 09/06/23  0657   HGB 11.1*   WBC 15.5*     Pressure injury risk assessment:   Sensory Perception: 4-->no impairment  Moisture: 4-->rarely moist  Activity: 3-->walks occasionally  Mobility: 3-->slightly limited  Nutrition: 3-->adequate  Friction and Shear: 3-->no apparent problem  Lv Score: 20    Rachel Montez RN CWOCN  Pager no longer is use, please contact through [facility]: [facility] Nurse [place]  Dept. Office Number: [phone]

## 2023-09-07 ENCOUNTER — APPOINTMENT (OUTPATIENT)
Dept: OCCUPATIONAL THERAPY | Facility: CLINIC | Age: 48
DRG: 330 | End: 2023-09-07
Attending: PHYSICIAN ASSISTANT
Payer: COMMERCIAL

## 2023-09-07 LAB
PATH REPORT.COMMENTS IMP SPEC: NORMAL
PATH REPORT.COMMENTS IMP SPEC: NORMAL
PATH REPORT.FINAL DX SPEC: NORMAL
PATH REPORT.GROSS SPEC: NORMAL
PATH REPORT.MICROSCOPIC SPEC OTHER STN: NORMAL
PATH REPORT.RELEVANT HX SPEC: NORMAL
PHOTO IMAGE: NORMAL

## 2023-09-07 PROCEDURE — 120N000002 HC R&B MED SURG/OB UMMC

## 2023-09-07 PROCEDURE — 250N000011 HC RX IP 250 OP 636: Mod: JZ

## 2023-09-07 PROCEDURE — 250N000013 HC RX MED GY IP 250 OP 250 PS 637: Performed by: STUDENT IN AN ORGANIZED HEALTH CARE EDUCATION/TRAINING PROGRAM

## 2023-09-07 PROCEDURE — 97166 OT EVAL MOD COMPLEX 45 MIN: CPT | Mod: GO | Performed by: OCCUPATIONAL THERAPIST

## 2023-09-07 PROCEDURE — 999N000248 HC STATISTIC IV INSERT WITH US BY RN

## 2023-09-07 PROCEDURE — 250N000013 HC RX MED GY IP 250 OP 250 PS 637

## 2023-09-07 PROCEDURE — 97530 THERAPEUTIC ACTIVITIES: CPT | Mod: GO | Performed by: OCCUPATIONAL THERAPIST

## 2023-09-07 PROCEDURE — 250N000013 HC RX MED GY IP 250 OP 250 PS 637: Performed by: PHYSICIAN ASSISTANT

## 2023-09-07 PROCEDURE — G0463 HOSPITAL OUTPT CLINIC VISIT: HCPCS

## 2023-09-07 PROCEDURE — 250N000011 HC RX IP 250 OP 636: Mod: JZ | Performed by: PHYSICIAN ASSISTANT

## 2023-09-07 PROCEDURE — 258N000003 HC RX IP 258 OP 636: Performed by: STUDENT IN AN ORGANIZED HEALTH CARE EDUCATION/TRAINING PROGRAM

## 2023-09-07 PROCEDURE — 97535 SELF CARE MNGMENT TRAINING: CPT | Mod: GO | Performed by: OCCUPATIONAL THERAPIST

## 2023-09-07 PROCEDURE — 99233 SBSQ HOSP IP/OBS HIGH 50: CPT | Performed by: PHYSICIAN ASSISTANT

## 2023-09-07 RX ORDER — HYDROMORPHONE HCL IN WATER/PF 6 MG/30 ML
.2-.4 PATIENT CONTROLLED ANALGESIA SYRINGE INTRAVENOUS
Status: DISCONTINUED | OUTPATIENT
Start: 2023-09-07 | End: 2023-09-08

## 2023-09-07 RX ORDER — METHOCARBAMOL 750 MG/1
750 TABLET, FILM COATED ORAL 4 TIMES DAILY PRN
Status: DISCONTINUED | OUTPATIENT
Start: 2023-09-07 | End: 2023-09-09

## 2023-09-07 RX ORDER — HYDROMORPHONE HYDROCHLORIDE 2 MG/1
4-6 TABLET ORAL EVERY 4 HOURS PRN
Status: DISCONTINUED | OUTPATIENT
Start: 2023-09-07 | End: 2023-09-08 | Stop reason: ALTCHOICE

## 2023-09-07 RX ADMIN — HYDROMORPHONE HYDROCHLORIDE 0.2 MG: 0.2 INJECTION, SOLUTION INTRAMUSCULAR; INTRAVENOUS; SUBCUTANEOUS at 12:25

## 2023-09-07 RX ADMIN — FAMOTIDINE 20 MG: 20 TABLET ORAL at 08:22

## 2023-09-07 RX ADMIN — ACETAMINOPHEN 975 MG: 325 TABLET, FILM COATED ORAL at 11:20

## 2023-09-07 RX ADMIN — HYDROMORPHONE HYDROCHLORIDE 4 MG: 2 TABLET ORAL at 08:22

## 2023-09-07 RX ADMIN — HYDROMORPHONE HYDROCHLORIDE 0.4 MG: 0.2 INJECTION, SOLUTION INTRAMUSCULAR; INTRAVENOUS; SUBCUTANEOUS at 21:40

## 2023-09-07 RX ADMIN — HYDROMORPHONE HYDROCHLORIDE 4 MG: 2 TABLET ORAL at 11:20

## 2023-09-07 RX ADMIN — HYDROMORPHONE HYDROCHLORIDE 0.4 MG: 0.2 INJECTION, SOLUTION INTRAMUSCULAR; INTRAVENOUS; SUBCUTANEOUS at 17:30

## 2023-09-07 RX ADMIN — ACETAMINOPHEN 975 MG: 325 TABLET, FILM COATED ORAL at 17:29

## 2023-09-07 RX ADMIN — METHOCARBAMOL 750 MG: 750 TABLET ORAL at 12:25

## 2023-09-07 RX ADMIN — HYDROXYZINE HYDROCHLORIDE 25 MG: 25 TABLET, FILM COATED ORAL at 13:31

## 2023-09-07 RX ADMIN — HYDROMORPHONE HYDROCHLORIDE 6 MG: 2 TABLET ORAL at 16:24

## 2023-09-07 RX ADMIN — HYDROXYZINE HYDROCHLORIDE 25 MG: 25 TABLET, FILM COATED ORAL at 05:57

## 2023-09-07 RX ADMIN — FAMOTIDINE 20 MG: 20 TABLET ORAL at 19:23

## 2023-09-07 RX ADMIN — VENLAFAXINE 150 MG: 75 TABLET ORAL at 08:22

## 2023-09-07 RX ADMIN — SODIUM CHLORIDE, POTASSIUM CHLORIDE, SODIUM LACTATE AND CALCIUM CHLORIDE: 600; 310; 30; 20 INJECTION, SOLUTION INTRAVENOUS at 05:57

## 2023-09-07 RX ADMIN — HYDROMORPHONE HYDROCHLORIDE 0.4 MG: 0.2 INJECTION, SOLUTION INTRAMUSCULAR; INTRAVENOUS; SUBCUTANEOUS at 05:50

## 2023-09-07 RX ADMIN — HYDROMORPHONE HYDROCHLORIDE 0.2 MG: 0.2 INJECTION, SOLUTION INTRAMUSCULAR; INTRAVENOUS; SUBCUTANEOUS at 12:04

## 2023-09-07 RX ADMIN — ENOXAPARIN SODIUM 40 MG: 40 INJECTION SUBCUTANEOUS at 13:30

## 2023-09-07 RX ADMIN — HYDROXYZINE HYDROCHLORIDE 50 MG: 50 TABLET ORAL at 21:40

## 2023-09-07 RX ADMIN — VENLAFAXINE 150 MG: 75 TABLET ORAL at 19:23

## 2023-09-07 RX ADMIN — METHOCARBAMOL 750 MG: 750 TABLET ORAL at 08:22

## 2023-09-07 RX ADMIN — HYDROMORPHONE HYDROCHLORIDE 4 MG: 2 TABLET ORAL at 04:49

## 2023-09-07 RX ADMIN — HYDROMORPHONE HYDROCHLORIDE 6 MG: 2 TABLET ORAL at 20:26

## 2023-09-07 RX ADMIN — ACETAMINOPHEN 975 MG: 325 TABLET, FILM COATED ORAL at 05:50

## 2023-09-07 RX ADMIN — HYDROMORPHONE HYDROCHLORIDE 4 MG: 2 TABLET ORAL at 01:36

## 2023-09-07 ASSESSMENT — ACTIVITIES OF DAILY LIVING (ADL)
ADLS_ACUITY_SCORE: 28
PREVIOUS_RESPONSIBILITIES: MEAL PREP;HOUSEKEEPING;LAUNDRY;SHOPPING;YARDWORK;MEDICATION MANAGEMENT;FINANCES;DRIVING
ADLS_ACUITY_SCORE: 28

## 2023-09-07 NOTE — PLAN OF CARE
0920-6128: A&Ox4, VSS on 2L O2 when sleeping. Pt reporting pain unmanaged w/ tylenol, robaxin, atarax and prn IV and PO dilaudid. Patient resting on and off. IV fluids discontinued today. OT walked w/ patient MCFP to her doorway and back and encouraged her to walk two more times today. Pt reporting she does not have an appetite. Adequate urine output in lacy, ileostomy producing green/brown liquid. SHAY producing serosanguinous output. Encouraging IS, though pt does not enjoy it, educated her on the importance.

## 2023-09-07 NOTE — PROVIDER NOTIFICATION
"Paged provider at 1955: \"FYI pt's pain consistently 8/10, does not decrease with pain meds. Pt states that it hurts to talk.\" Also FYI has not gotten out of bed since surgery due to pain. Thanks, Kathryn MARR [phone]\"  "

## 2023-09-07 NOTE — PROGRESS NOTES
09/07/23 1235   Appointment Info   Signing Clinician's Name / Credentials (OT) Melida Jeremykyaw OTR/L   Rehab Comments (OT) ABD prec   Living Environment   People in Home child(gold), dependent   Current Living Arrangements house   Home Accessibility stairs to enter home;stairs within home   Number of Stairs, Main Entrance 2   Number of Stairs, Within Home, Primary   (7 up 7 down)   Transportation Anticipated car, drives self;family or friend will provide   Living Environment Comments Pt lives with 17 yo dtr in split level entry home; pt can stay on upper level, laundry is in basement. Pt reports dtr is in school and after school activities until 5pm, but can assist when home   Self-Care   Usual Activity Tolerance good   Current Activity Tolerance fair   Equipment Currently Used at Home none   Fall history within last six months no   Activity/Exercise/Self-Care Comment Pt reports indep with mobility and I/ADLs at baseline, [place]   Instrumental Activities of Daily Living (IADL)   Previous Responsibilities meal prep;housekeeping;laundry;shopping;yardwork;medication management;finances;driving   IADL Comments dtr can assist with heavy home chores including laundry, groceries, at dc   General Information   Onset of Illness/Injury or Date of Surgery 09/06/23   Referring Physician Judi Sanabria PA-C   Patient/Family Therapy Goal Statement (OT) home   Additional Occupational Profile Info/Pertinent History of Current Problem per chart: 48 year old female who has PMH ulcerative colitis s/p TAC with end ileostomy (laparoscopic) on 6/15/21 with subsequent parastomal hernia, inflammatory arthritis secondary to immunotherapy, chronic prednisone use, type 2 diabetes, obesity, history of CVA in 2004 after giving birth, prothrombin deficiency, obstructive sleep apnea, asthma, lymphedema, lumbago, chronic pain, fibromyalgia, depression, anxiety and insomnia. She is now s/p robotic  proctectomy, ileostomy takedown, and creation of ileal J-pouch on 9/5/23.   Existing Precautions/Restrictions fall;abdominal   Left Upper Extremity (Weight-bearing Status) partial weight-bearing (PWB)  (10#)   Right Upper Extremity (Weight-bearing Status) partial weight-bearing (PWB)  (10#)   General Observations and Info activity order: up with assist   Cognitive Status Examination   Affect/Mental Status (Cognitive) WFL   Follows Commands follows one-step commands;over 90% accuracy   Cognitive Status Comments pt appropriate with interview and new learning   Pain Assessment   Patient Currently in Pain Yes, see Vital Sign flowsheet   Posture   Posture forward head position;protracted shoulders   Range of Motion Comprehensive   Comment, General Range of Motion BUE/BLE WFL   Strength Comprehensive (MMT)   Comment, General Manual Muscle Testing (MMT) Assessment BUE/BLE WFL   Coordination   Upper Extremity Coordination No deficits were identified   Bed Mobility   Bed Mobility scooting/bridging;supine-sit;sit-supine   Scooting/Bridging [place] (Bed Mobility) contact guard;verbal cues   Supine-Sit [place] (Bed Mobility) minimum assist (75% patient effort)   Sit-Supine [place] (Bed Mobility) moderate assist (50% patient effort)   Assistive Device (Bed Mobility) bed rails   Comment (Bed Mobility) HOB 60*   Transfers   Transfers sit-stand transfer;toilet transfer;shower transfer   Sit-Stand Transfer   Sit-Stand [place] (Transfers) minimum assist (75% patient effort);verbal cues   Sit/Stand Transfer Comments arm-in-arm   Shower Transfer   Shower Transfer Comments ModA per clinical judgement   Toilet Transfer   Toilet Transfer Comments ModA per clinical judgement   Balance   Balance Comments good seated; unsteady standing, arm-in-arm for balance   Activities of Daily Living   BADL Assessment/Intervention upper body dressing;lower body dressing;grooming;toileting   Upper Body Dressing Assessment/Training    Comment, (Upper Body Dressing) ABD binder   [place] Level (Upper Body Dressing) moderate assist (50% patient effort)   Lower Body Dressing Assessment/Training   [place] Level (Lower Body Dressing) maximum assist (25% patient effort)   Grooming Assessment/Training   [place] Level (Grooming) set up   Toileting   [place] Level (Toileting) moderate assist (50% patient effort)   Clinical Impression   Criteria for Skilled Therapeutic Interventions Met (OT) Yes, treatment indicated   OT Diagnosis impaired ADLs   OT Problem List-Impairments impacting ADL problems related to;activity tolerance impaired;range of motion (ROM);strength;pain;post-surgical precautions   Assessment of Occupational Performance 3-5 Performance Deficits   Identified Performance Deficits dressing, toilet transfer, shower transfer, home chores   Planned Therapy Interventions (OT) ADL retraining;IADL retraining;bed mobility training;strengthening;transfer training;home program guidelines   Clinical Decision Making Complexity (OT) moderate complexity   Anticipated Equipment Needs Upon Discharge (OT) shower chair   Risk & Benefits of therapy have been explained evaluation/treatment results reviewed;patient   OT Total Evaluation Time   OT Eval, Moderate Complexity Minutes (23177) 6   OT Goals   Therapy Frequency (OT) Daily   OT Predicted Duration/Target Date for Goal Attainment 09/14/23   OT Goals Upper Body Dressing;Lower Body Dressing;Lower Body Bathing;Bed Mobility;Transfers;Toilet Transfer/Toileting;OT Goal 1   OT: Upper Body Dressing Modified independent;within precautions   OT: Lower Body Dressing Modified independent;using adaptive equipment;within precautions   OT: Lower Body Bathing Modified independent;using adaptive equipment;with precautions   OT: Bed Mobility Modified independent;supine to/from sitting;within precautions   OT: Transfer Supervision/stand-by assist;within precautions   OT: Toilet Transfer/Toileting Modified  independent;toilet transfer;cleaning and garment management;within precautions   OT: Goal 1 Indep with functional mobility for household distances, including Veronica 7 stairs   Interventions   Interventions Quick Adds Self-Care/Home Management;Therapeutic Activity   OT Discharge Planning   OT Plan progress ambulation, toilet transfer, shower transfer, stairs x7, review ABD prec, bed mobility w/flat bed, screen for PT   OT Discharge Recommendation (DC Rec) home with assist   OT Rationale for DC Rec Pt below baseline, limited by weakness, pain, anxiety.  Anticipate pt will progress to home with assist for heavy home chores, once medically stable and pain is controlled.  Pt is independent and active at baseline.   OT Brief overview of current status Ax1 room ambulation arm-in-arm; please encourage OOB 3-4x/day; session limited by bloody dc and pain   Total Session Time   Timed Code Treatment Minutes 24   Total Session Time (sum of timed and untimed services) 30

## 2023-09-07 NOTE — PLAN OF CARE
"Pt A/O, VSS on RA to 1LNC. On cont SpO2 monitoring. Refused to get out of bed despite multiple attempts and education, provider is aware. Refused SCDs. Low fiber diet, pt states she has no appetite and did not eat, tolerating sips of water, denies nausea. Pain consistently 8/10, states \"it hurts to talk,\" received PRN PO dilaudid and PRN IV dilaudid as well as routine medications. Provider aware of continued pain. Previous ileostomy site covered in packed dressing, new ileostomy with gas and BM. LLQ SHAY x1. Major in place with adequate urine output. LR @ 75mL/hr in R PIV.  "

## 2023-09-07 NOTE — PROGRESS NOTES
Colorectal Surgery Progress Note  [facility]  POD#2      Subjective:    Doretha complains of ongoing pain throughout the night despite maxing out PRN pain medications. She was however able to sleep better than the previous night. Making adequate stool via ostomy, voiding via lacy catheter. Encouraged to eat and drink this morning.    Vitals:  Vitals:    09/06/23 0637 09/06/23 1524 09/06/23 2221 09/07/23 0600   BP: 99/55 97/53 104/58 98/44   BP Location:  Right arm Right arm Right arm   Pulse:  106 93 91   Resp:  16 16 18   Temp:  98.6  F (37  C) 98.6  F (37  C) 97.8  F (36.6  C)   TempSrc:  Oral Oral Oral   SpO2:  95% 98% 97%   Weight:       Height:         I/O:  I/O last 3 completed shifts:  In: 2459 [P.O.:1245; I.V.:1214]  Out: 1945 [Urine:1525; Drains:320; Stool:100]    Physical Exam:  Gen: AAOx3, NAD  Pulm: Non-labored breathing  Abd: Soft, non-distended, significantly tender globally though particularly in the LLQ   Incision covered by partially saturated dressing, will replace this am   SHAY drain serosanguinous output  Ext:  Warm and well-perfused    BMP  Recent Labs   Lab 09/06/23 0657 09/05/23  2348 09/05/23  1727 09/05/23  0830    141  --   --    POTASSIUM 4.1 4.5  --   --    CHLORIDE 106 107  --   --    CO2 22 22  --   --    BUN 8.7 7.7  --   --    CR 0.87 0.78  --   --    * 138* 153* 110*   MAG 1.8  --   --   --    PHOS 4.9*  --   --   --      CBC  Recent Labs   Lab 09/06/23 0657 09/05/23 2348   WBC 15.5* 19.1*   HGB 11.1* 11.1*   HCT 36.2 35.8    345         ASSESSMENT:   This is a 48 year old female with complex medical history to include ulcerative colitis s/p TAC with end ileostomy (laparoscopic) on 6/15/21 with subsequent parastomal hernia, inflammatory arthritis secondary to immunotherapy, chronic prednisone use, type 2 diabetes, obesity, history of CVA in 2004 after giving birth, prothrombin deficiency, obstructive sleep apnea, asthma, lymphedema,  lumbago, chronic pain, depression, anxiety and insomnia. She is now s/p robotic proctectomy, ileostomy takedown, and creation of ileal J-pouch. Postoperative course so far complicated by uncontrolled pain.    Neuro/Pain: Transitioned to oral pain regimen with PO dilaudid 2-4mg q3h. Increased APAP to 975mg Q6h. Atarax PRN as adjunctive. Robaxin 500mg QID.  Pain team following, appreciate recommendations.  PULM: encourage IS.  GI/FEN: LRD  : Continue Major until POD3  Heme: Hemoglobin stable post-op  ID: Complete periop antibiotics  Activity: as tolerated, must get OOB today, ambulate  Dispo: POD3-5 pending ROBF        Patient was seen and discussed with Dr. Rodriguez.    Lowell Bowman MD  General Surgery PGY-1  Colon and Rectal Surgery  Pager: [phone]

## 2023-09-07 NOTE — PROGRESS NOTES
"Pain Service Progress Note  [facility]  Date: 09/07/2023       Patient Name: Doretha Yu  MRN: [id number]  Age: 48 year old  Sex: female      Assessment:  Doretha Yu is a 48 year old female who has PMH ulcerative colitis s/p TAC with end ileostomy (laparoscopic) on 6/15/21 with subsequent parastomal hernia, inflammatory arthritis secondary to immunotherapy, chronic prednisone use, type 2 diabetes, obesity, history of CVA in 2004 after giving birth, prothrombin deficiency, obstructive sleep apnea, asthma, lymphedema, lumbago, chronic pain, fibromyalgia, depression, anxiety and insomnia. She is now s/p robotic proctectomy, ileostomy takedown, and creation of ileal J-pouch on 9/5/23.     Pain service was consulted to assist with pain management.  She is opioid naive at baseline.    She has chronic peripheral neuropathy, arthritis and fibromyalgia for which she uses medical cannabis-vapors for pain relief (certified by rheumatology at the [facility]).    Doretha is seen sitting up in bed, first time since surgery.  She reports pain is unchanged from yesterday.  Abdominal surgical pain is still \"excruciating\" with pain level of 7-8/10.  She notes that she was able to sleep last night.  Would like to have more pain relief.  Feels that PO and IV Dilaudid together help with pain quite a bit.  We discussed potentially increasing dose of PO Dilaudid for more pain relief and to utilize less IV Dilaudid.  Indications, risks and benefits reviewed. She is still preferring to avoid additional medications (specifically gabapentin) if possible.    Plan/Recommendations:    Change Dilaudid 4-6mg PO Q 4 hours PRN.  (Opioid naive prior to admission)  Change Dilaudid 0.2-0.4mg IV Q 3 hours PRN.  Use oral Dilaudid first.  Robaxin 500-750mg PO Q 6 hours PRN.  Continue acetaminophen.  Menthol patches as needed.  If okay by primary team, consider toradol 15mg IV Q 6 hours PRN. Max is 5 days.   Bowel " "regimen.  Wants to avoid additional medications, avoiding gabapentin and pregabalin due to weight gain / ineffectiveness.     Thank you for the opportunity to participate in the care of Doretha Yu  Pain Service will continue to follow.     Discussed with attending anesthesiologist  Primary Service Contacted with Recommendations? Yes      Alla Kulkarni PA-C  09/07/2023       Diet: Low Fiber Diet    Relevant Labs:  Recent Labs   Lab Test 09/06/23  0657 01/13/23  0527 01/12/23  0736 05/07/21  0544 05/06/21  1356   INR  --   --  0.95  --  0.96      < > 354   < > 308   PTT  --   --   --   --  23   BUN 8.7   < > 9.1   < > 19    < > = values in this interval not displayed.       Physical Exam:  Vitals: BP 98/44 (BP Location: Right arm)   Pulse 91   Temp 97.8  F (36.6  C) (Oral)   Resp 18   Ht 1.6 m (5' 3\")   Wt 95.2 kg (209 lb 14.1 oz)   SpO2 97%   BMI 37.18 kg/m      Physical Exam:     CONSTITUTIONAL/GENERAL APPEARANCE: Conversant.  EYES: EOMI, sclerae clear  ENT/NECK: neck is supple  RESPIRATORY:non labored breathing, on room air  CARDIOVASCULAR: HR within normal limits  GI:soft, nontender, has abdominal binder on, ostomy bag present with output.  MUSCULOSKELETAL/BACK/SPINE/EXTREMITIES: Moving UE and LE independently.     NEURO:  AAOx3.   SKIN/VASCULAR EXAM:  Dry and warm.  PSYCHIATRIC/BEHAVIORAL/OBSERVATIONS:  Verbalizes   Judgment/Insight -fair   Orientation - x3   Memory -fair   Mood and affect - calm, pleasant, cooperative         Relevant Medications:  Current Pain Medications:  Medications related to Pain Management (From now, onward)      Start     Dose/Rate Route Frequency Ordered Stop    09/07/23 0800  methocarbamol (ROBAXIN) tablet 750 mg         750 mg Oral 4 TIMES DAILY 09/06/23 2332      09/06/23 1125  acetaminophen (TYLENOL) tablet 975 mg         975 mg Oral EVERY 6 HOURS 09/06/23 0611      09/06/23 0932  HYDROmorphone (DILAUDID) injection 0.2-0.4 mg         0.2-0.4 mg Intravenous EVERY 2 " "HOURS PRN 09/06/23 0933      09/06/23 0931  HYDROmorphone (DILAUDID) tablet 2-4 mg         2-4 mg Oral EVERY 3 HOURS PRN 09/06/23 0933      09/06/23 0636  hydrOXYzine (ATARAX) tablet 25 mg        See Hyperspace for full Linked Orders Report.    25 mg Oral EVERY 6 HOURS PRN 09/06/23 0636      09/06/23 0636  hydrOXYzine (ATARAX) tablet 50 mg        See Hyperspace for full Linked Orders Report.    50 mg Oral EVERY 6 HOURS PRN 09/06/23 0636      09/05/23 1902  lidocaine 1 % 0.1-1 mL         0.1-1 mL Other EVERY 1 HOUR PRN 09/05/23 1902      09/05/23 1902  lidocaine (LMX4) cream          Topical EVERY 1 HOUR PRN 09/05/23 1902      09/05/23 0650  BUPivacaine liposome (EXPAREL) LA inj was given in the infiltration site to produce post-op analgesia. Duration of action is up to 72 hours. Other \"aguilar\" meds should not be given for 96 hours except for lidocaine 4% patch. This is for INFORMATION ONLY.          Does not apply CONTINUOUS PRN 09/05/23 0650 09/09/23 0649            Primary Service Contacted with Recommendations? Yes            Acute Inpatient Pain Service [facility]  Hours of pain coverage 24/7   Page via enavu- Please Page the Pain Team [place] [facility] [place] \"[place]IN [place]NAGEMENT ACUTE INPATIENT/ [facility]\"            "

## 2023-09-07 NOTE — DISCHARGE SUMMARY
[facility]  Discharge Summary  Colon and Rectal Surgery     Doretha Yu MRN# [id number]   YOB: 1975 Age: 48 year old     Date of Admission:  9/5/2023  Date of Discharge::  9/11/2023  Admitting Physician:  Quinton Ram MD  Discharge Physician:    Primary Care Physician:        Anna Naidu          Admission Diagnoses:   Ulcerative pancolitis with other complication (H) [K51.018]  Gastroesophageal reflux disease without esophagitis [K21.9]  Parastomal hernia  Inflammatory arthritis  Fibromyalgia  Type 2 diabetes  Obesity  Prothrombin deficiency  Obstructive sleep apnea  Asthma  Lymphedema  Lumbago  Chronic pain  Depression  Anxiety  Insomnia         Discharge Diagnosis:   Ulcerative pancolitis with other complication (H) [K51.018]  Gastroesophageal reflux disease without esophagitis [K21.9]  Inflammatory arthritis  Fibromyalgia  Type 2 diabetes  Obesity  Prothrombin deficiency  Obstructive sleep apnea  Asthma  Lymphedema  Lumbago  Acute postoperative pain  Chronic pain  Depression  Anxiety  Insomnia         Procedures:   1.  Laparoscopic robotic-assisted completion proctectomy.  2.  Takedown of end ileostomy  3.  Creation of ileal J-pouch with ileoanal anastomosis   4.  Flexible pouchoscopy/sigmoidoscopy.  5.  Diverting loop ileostomy creation           Consultations:   WOUND OSTOMY CONTINENCE NURSE  IP CONSULT  PHYSICAL THERAPY ADULT IP CONSULT  OCCUPATIONAL THERAPY ADULT IP CONSULT  PAIN MANAGEMENT ADULT IP CONSULT  CARE MANAGEMENT / SOCIAL WORK IP CONSULT  NURSING TO CONSULT FOR VASCULAR ACCESS CARE IP CONSULT  NURSING TO CONSULT FOR VASCULAR ACCESS CARE IP CONSULT         Imaging Studies:     Results for orders placed or performed during the hospital encounter of 09/05/23   POC US Guidance Needle Placement    Narrative    Transversus abdominus plane block    Impression    Transversus abdominus plane block       *Note: Due to a large  number of results and/or encounters for the requested time period, some results have not been displayed. A complete set of results can be found in Results Review.              Medications Prior to Admission:     Medications Prior to Admission   Medication Sig Dispense Refill Last Dose    clobetasol (TEMOVATE) 0.05 % external cream Apply 1 Application topically 2 times daily       medical cannabis (Patient's own supply) See Admin Instructions (The purpose of this order is to document that the patient reports taking medical cannabis.  This is not a prescription, and is not used to certify that the patient has a qualifying medical condition.)   Past Week    metFORMIN (GLUCOPHAGE) 500 MG tablet TAKE 2 TABLETS BY MOUTH 2 TIMES DAILY WITH MEALS (Patient taking differently: Take 500 mg by mouth 2 times daily (with meals) TAKE 2 TABLETS BY MOUTH 2 TIMES DAILY WITH MEALS) 360 tablet 1 Past Week at 0700    ondansetron (ZOFRAN ODT) 4 MG ODT tab Take 1 tablet (4 mg) by mouth every 8 hours as needed for nausea or vomiting (Patient taking differently: Take 4 mg by mouth as needed for nausea or vomiting) 10 tablet 1 Past Week    phentermine (ADIPEX-P) 15 MG capsule Take 15 mg by mouth every morning   Past Week    venlafaxine (EFFEXOR) 100 MG tablet Take 1 tablet (100 mg) by mouth 2 times daily Total of 150 mg bid 180 tablet 1 Past Week    venlafaxine (EFFEXOR) 50 MG tablet Take 1 tablet (50 mg) by mouth 2 times daily Total of 150 mg twice daily 180 tablet 1 Past Week    albuterol (PROAIR HFA/PROVENTIL HFA/VENTOLIN HFA) 108 (90 Base) MCG/ACT inhaler Inhale 2 puffs into the lungs every 4 hours as needed for shortness of breath / dyspnea 18 g 1     Ostomy Supplies MISC 20 each daily 20 each 11     [DISCONTINUED] acetaminophen (TYLENOL) 325 MG tablet Take 3 tablets (975 mg) by mouth every 8 hours As scheduled for the next 7-10 days, then decrease both dose & frequency to as needed there after. 90 tablet 0 Past Week               Discharge Medications:     Current Discharge Medication List        START taking these medications    Details   famotidine (PEPCID) 20 MG tablet Take 1 tablet (20 mg) by mouth 2 times daily  Qty: 60 tablet, Refills: 0    Associated Diagnoses: Ulcerative pancolitis with rectal bleeding (H)      hydrOXYzine (ATARAX) 25 MG tablet Take 1 tablet (25 mg) by mouth every 6 hours as needed for other (adjuvant pain)  Qty: 15 tablet, Refills: 0    Associated Diagnoses: Acute post-operative pain      menthol (ICY HOT) 5 % PTCH Apply 1 patch topically every 8 hours as needed for muscle soreness  Qty: 10 patch, Refills: 0    Associated Diagnoses: Acute post-operative pain      methocarbamol (ROBAXIN) 750 MG tablet Take 1 tablet (750 mg) by mouth 4 times daily as needed for muscle spasms  Qty: 40 tablet, Refills: 0    Associated Diagnoses: Ulcerative pancolitis with rectal bleeding (H)      !! oxyCODONE (ROXICODONE) 5 MG tablet Take 3 tablets (15 mg) by mouth every 4 hours  Qty: 36 tablet, Refills: 0    Comments: Take 15mg every 4 hours as needed for the first two days after discharge. Decrease by one dose each day as able. After 2 days, decrease to 10mg every 4 hours, and continue to decrease by one dose each day  Associated Diagnoses: Acute post-operative pain      !! oxyCODONE IR (ROXICODONE) 5 MG tablet Take 2 tablets (10 mg) by mouth every 4 hours  Qty: 60 tablet, Refills: 0    Associated Diagnoses: Acute post-operative pain       !! - Potential duplicate medications found. Please discuss with provider.        CONTINUE these medications which have CHANGED    Details   acetaminophen (TYLENOL) 325 MG tablet Take 3 tablets (975 mg) by mouth every 6 hours  Qty: 100 tablet, Refills: 0    Associated Diagnoses: Ulcerative pancolitis with rectal bleeding (H)           CONTINUE these medications which have NOT CHANGED    Details   clobetasol (TEMOVATE) 0.05 % external cream Apply 1 Application topically 2 times daily      medical  cannabis (Patient's own supply) See Admin Instructions (The purpose of this order is to document that the patient reports taking medical cannabis.  This is not a prescription, and is not used to certify that the patient has a qualifying medical condition.)      metFORMIN (GLUCOPHAGE) 500 MG tablet TAKE 2 TABLETS BY MOUTH 2 TIMES DAILY WITH MEALS  Qty: 360 tablet, Refills: 1    Associated Diagnoses: Diabetes mellitus, iatrogenic (H)      ondansetron (ZOFRAN ODT) 4 MG ODT tab Take 1 tablet (4 mg) by mouth every 8 hours as needed for nausea or vomiting  Qty: 10 tablet, Refills: 1    Associated Diagnoses: Chronic gastritis, presence of bleeding unspecified, unspecified gastritis type      phentermine (ADIPEX-P) 15 MG capsule Take 15 mg by mouth every morning      !! venlafaxine (EFFEXOR) 100 MG tablet Take 1 tablet (100 mg) by mouth 2 times daily Total of 150 mg bid  Qty: 180 tablet, Refills: 1    Associated Diagnoses: Current episode of major depressive disorder without prior episode, unspecified depression episode severity      !! venlafaxine (EFFEXOR) 50 MG tablet Take 1 tablet (50 mg) by mouth 2 times daily Total of 150 mg twice daily  Qty: 180 tablet, Refills: 1    Associated Diagnoses: Current episode of major depressive disorder without prior episode, unspecified depression episode severity      albuterol (PROAIR HFA/PROVENTIL HFA/VENTOLIN HFA) 108 (90 Base) MCG/ACT inhaler Inhale 2 puffs into the lungs every 4 hours as needed for shortness of breath / dyspnea  Qty: 18 g, Refills: 1    Associated Diagnoses: Bronchitis      Ostomy Supplies MISC 20 each daily  Qty: 20 each, Refills: 11    Comments: Keith Precut 14504  x20 each  Keith  79113 pouch ,  x20 each  148167 Sarah ring   x20 each   7760 adhesive remover,  X 1 box   7299 belt   x1 each  Change barrier and pouch 3xweek  Associated Diagnoses: Ileostomy status (H)       !! - Potential duplicate medications found. Please discuss with provider.          "          Brief History of Illness:   This is a 48 year old female with complex medical history to include ulcerative colitis s/p TAC with end ileostomy (laparoscopic) on 6/15/21 with subsequent parastomal hernia, inflammatory arthritis secondary to immunotherapy, chronic prednisone use, type 2 diabetes, obesity, history of CVA in 2004 after giving birth, prothrombin deficiency, obstructive sleep apnea, asthma, lymphedema, lumbago, chronic pain, depression, anxiety and insomnia. She is now s/p robotic proctectomy, ileostomy takedown, and creation of ileal J-pouch.         Hospital Course:   Post-operatively pt was gently fluid resuscitated.  Major was removed and pt was able to void on post-operative day 3.  Pt had eventual return of bowel function and was able to tolerate small amounts of a low fiber diet. Her post-operative course was complicated by uncontrolled pain which was only moderately improved with hydromorphone, robaxin, APAP, and atarax. She has a history of fibromyalgia and endorses chronic pain at baseline, stating that her extremities feel as if they are \"about to fall off\" due to pain. At home she uses only marijuana for pain relief, avoiding NSAIDs due to her prothrombin deficiency.     There was some concern regarding possible ileovaginal fistula based on thick brown/red vaginal drainage, though this was thought to be less likely based on operative and exam findings. If there continues to be ongoing concerns of drainage of stool or succus from the vagina, we would recommend vaginal contrast vs pouchogram to further assess the presence of a fistula.    Patient is to follow up in the Colon and Rectal Surgery Clinic in 2-3 week with Jena Null NP or Mariela Crandall PA-C and then with Dr. Ram in 2-3 weeks after.          Day of Discharge Physical Exam:   Blood pressure 100/56, pulse 81, temperature 98.1  F (36.7  C), temperature source Oral, resp. rate 16, height 1.6 m (5' 3\"), " weight 95.2 kg (209 lb 14.1 oz), SpO2 92 %, not currently breastfeeding.    Gen:      AAOx3, NAD  Pulm:    Non-labored breathing  Abd:      Soft, non-distended, moderately tender globally though particularly in the LLQ               Incision covered by partially saturated dressing, will replace this am               SHAY drain serosanguinous output  Ext:       Warm and well-perfused         Final Pathology Result:   Pending at time of discharge           Discharge Instructions and Follow-Up:     DIET  -Low Residue Diet for at least 4-6 weeks.  No raw vegetables, fruit skins, fibrous foods that require a lot of chewing, nuts, seeds, corn, popcorn.   -We recommend eating slowly, chewing thoroughly, eating small frequent meals throughout the day  -Stay well hydrated.      ACTIVITY  -No lifting, pushing, pulling greater than 10 lbs and no strenuous exercise for 6 weeks   -Do not insert anything into your anus or rectum for 6 weeks  -No driving while on narcotic analgesics (i.e. Percocet, oxycodone, Vicodin)  -No driving until you are able to fully twist to both sides or slam on brakes quickly and without any pain  -We encourage walking at least 4-5 times per day    WOUND CARE  -Inspect your wounds daily for signs of infection (increased redness, drainage, pain)  -Keep your wound clean and dry  -You may shower, but do not soak in tub or pool  -Pack old ostomy sites 1-2 times per day with either Nugauze or minimally moistened gauze and cover with additional gauze and secure in place with medical adhesive. Change this gauze 1-2 times per day and more as needed for any drainage/saturation/showering.     NOTIFY  Please contact Annamarie Espinal RN or Charis Adkins RN at [phone] for problems after discharge such as:  -Temperature > 101F, chills, rigors, dizziness  -Redness around or purulent drainage from wound  -Inability to tolerate diet, nausea or vomiting  -You stop passing gas, develop significant bloating, abdominal  pain  -Have blood in stools/vomit  -Have severe diarrhea/constipation  -Any other questions or concerns.  - At nights (after 4:30pm), on weekends, or if urgent, call [phone] and ask the  to speak with the on-call Colorectal Surgery resident or fellow      Medication Instructions  Some of your medications may have changed. Please take only prescribed and resumed medications     FOLLOW-UP  1.  You will need to follow-up with Jena Null NP or Mariela Crandall PA-C in the Colon and Rectal Surgery clinic in 1-2 week(s) and then with CRS Staff: Dr. Quinton Ram in 3-4 weeks after.  Please contact our Nurses (phone # [phone]) if you have not heard from our clinic in 3 business days afer discharge to schedule a follow-up appointment.    2.  Follow up with your primary care provider in 1-2 weeks after discharge from the hospital to review this hospitalization.          Home Health Care:     Attempted to obtain home care, unable due to staffing.           Discharge Disposition:     Discharged to home      Condition at discharge: Timoteo Bowman MD  General Surgery PGY-1  Colon and Rectal Surgery  Pager: [phone]    Pt was seen and discussed with Dr. Hernandez on 9/11/23.    The above plan of care was performed and communicated to me by CRS Staff: Dr. Quinton Ram     I spent 45 minute face-to-face or coordinating care of Doretha Yu. Over 50% of our time on the unit was spent counseling the patient and/or coordinating care as documented in the assessment and plan.     14759 post op hospital visit

## 2023-09-07 NOTE — PLAN OF CARE
"Goal Outcome Evaluation:      Plan of Care Reviewed With: patient    Overall Patient Progress: no change    BP 98/44 (BP Location: Right arm)   Pulse 91   Temp 97.8  F (36.6  C) (Oral)   Resp 18   Ht 1.6 m (5' 3\")   Wt 95.2 kg (209 lb 14.1 oz)   SpO2 97%   BMI 37.18 kg/m      Neuro: A&Ox4.   Cardiac: Afebrile, VSS.   Respiratory: RA   GI/: UOP wdl via lacy catheter. Ileostomy patent with watery green output.  Diet/appetite: Tolerating diet. Poor po intake. Denies nausea   Activity: refusing to get oob 2/2 pain. Encouraged increased activity. Educated on respiratory issues and increased weakness that can occur with post op inactivity.   Pain: .reports continued pain- appears to rest btwn cares. Scheduled tylenol and prns: po and IV dilaudid, atarax admin through night. To be seen by pain service team today.  Skin: dressings intact. No drainage from incisions noted.  Lines: piv infusing LR at 75 ml/hr  Drains: SHAY- dressing CDI; serosang output measured and recorded. Lacy catheter- patent. Ileostomy- intact and patent    Rested btwn cares. Able to make needs known. Continue to monitor. Notify MD of changes/concerns      Problem: Plan of Care - These are the overarching goals to be used throughout the patient stay.    Goal: Optimal Comfort and Wellbeing  Outcome: Not Progressing     Problem: Plan of Care - These are the overarching goals to be used throughout the patient stay.    Goal: Readiness for Transition of Care  Outcome: Not Progressing          "

## 2023-09-07 NOTE — PROGRESS NOTES
[facility] Nurse Inpatient Assessment     Consulted for: new loop ileostomy RLQ      Patient History (according to Colorectal provider note(s) 9/6/23:      This is a 48 year old female with complex medical history to include ulcerative colitis s/p TAC with end ileostomy (laparoscopic) on 6/15/21 with subsequent parastomal hernia, inflammatory arthritis secondary to immunotherapy, chronic prednisone use, type 2 diabetes, obesity, history of CVA in 2004 after giving birth, prothrombin deficiency, obstructive sleep apnea, asthma, lymphedema, lumbago, chronic pain, depression, anxiety and insomnia. She is now s/p robotic proctectomy, ileostomy takedown, and creation of ileal J-pouch on 9/5/23 with Dr Ram.     Per OP note:   Doretha Yu is a 48-year-old lady who underwen total abdominal colectomy with end ileostomy by me in June 2021 for ulcerative pancolitis.  Thankfully, she recovered very well from this.  She has lost over 60 lbs in the past year and now is a much more appropriate candidate for this elective procedure.  Her ileostomy was functioning well with and she denied any pouching issues.  She denied any previous anorectal operations.  We had a long discussion with regards to a restorative proctocolectomy versus end ileostomy.  She elected to proceed with an ileal J-pouch.     Assessment:      Areas visualized during today's visit: Abdomen    Assessment of new loop Ileostomy:  Diagnosis Pertinent to Stoma: Ulcerative Colitis      Surgery Date: 9/5/23  Surgeon: Dr Ram      Hospital: [facility]  Pouching system in place on assessment today: Keith 70mm two piece, cut to fit, and flat   Pouch barrier status:  50% melted- RN thought was leaking but after removing pouch it would have leaked before tomorrow but it was likely the midline that leaked on ostomy pouch  Pouch last changed/wear time: 24 hours, 9/6/23  Reason for pouch change today: ostomy  "education and pouch not changed today  Effectiveness of current pouching/ supply plan: Effective  Change made with ostomy management today: No  Pouching system placed today: Keith 57mm two piece, cut to fit, flat, barrier ring, and skin prep- stretched barrier ring out then pinched together at 3 and 9 O'clock to fill crease  Supplies: gathered, ordered ostomy belt, convex barrier and convex barrier ring, supplies stored on unit, discussed with RN, and discussed with patient (pt has precut barriers that are 1\"- will not fit right now but explained to pt that they may work in 4-6 weeks once swelling goes down)    Last photo: 9/6/23  Stoma location: RLQ (previous site on LLQ would not work so had to be moved)  Stoma size: 1 1/8 inches, slightly oval  Stoma appearance: red, oval, moist, edematous, and protruberant  Mucocutaneous junction:  intact, 9/7did not evaluate, pouch on  Peristomal complication(s): none   Output: liquid and brown,green  Output volume emptied during visit: 50 ml  Abdominal assessment: Distended  Surgical site(s): dressing dry and intact, changed per Colorectal team  NG still in place? No  Pain: Cramping and Sharp  Is patient still on a PCA? No    Ostomy education assessment:  Participant of teaching session today: patient   Education completed today: Stoma assessment, Pouching system assessment , Evaluate leakage issue, Importance of hydration, When to seek medical attention, and Discharge instructions  Educational materials/methods: Verbal, Demonstration, and Addressed patient/caregiver questions/concerns  Education still needed: Pouch change return demonstration and Discharge instructions, pt has had an ostomy previously- see HPI.   Learning Comprehension:   Psychosocial assessment: JUDAH- pt in a lot of pain today but has had an ileostomy in the past which was flat/retracted and she had no difficulty pouching it.   9/7 Reported she is comfortable with pouch changes, only had questions " "regarding supplies to go home with. Supplies provided and at bedside. Questions answered  Patient readiness for education today: attentive and in significant pain  Following today's visit: patient  is able to demonstrate;         1. How to empty their pouch? Yes        2. How to change their pouch? Yes        3. How to read and record intake and output correctly? Yes and with minimal assist- today due to pain  Preparation for discharge completed: No discharge preparation started yet, Ensured patient has extra supplies for discharge, Discussed how to order supplies after discharge , Discussed how and when to make an outpatient WOC nurse appointment after discharge, Prepared for discharge home with home care, and Discuss signs/symptoms of when to seek medical attention  Preparation for discharge still needed: Placed prescription recommendations in discharge navigator for MD to sign  Pt support system on discharge: parents  WO recommend home care? No  Face to face time: 15 minutes         Treatment Plan:     RLQ Ileostomy pouching plan:   Pouching system: ostomy supplies pouches: [place] 57 FECAL (811174) ostomy supplies barrier: [place] 57mm FLAT (309346)  Accessories used: [facility] ostomy accessories: 2\" Cera Barrier Ring (450930), Large Belt (153473), M9 Drops ([id number]), and Cavilon no sting barrier film ([id number])   Frequency of pouch changes: PRN leakage and Three times a week  WOC follow up plan: Daily Monday-Friday (as able)  Bedside RN interventions: Change pouch PRN if leaking using the supplies above, Empty pouch when 1/3 to 1/2 full, ensure to clean pouch outlet after emptying to prevent odor, Notify WOC for ongoing pouch leakage, and Document stoma appearance and output volume, color, and consistency every shift     Orders: Reviewed    RECOMMEND PRIMARY TEAM ORDER: None, at this time  Education provided: plan of care  Discussed plan of care with: Patient and Nurse  WOC nurse follow-up plan: " Monday/WednesdayFriday  Notify [facility] if wound(s) deteriorate.  Nursing to notify the Provider(s) and re-consult the [facility] Nurse if new skin concern.    DATA:     Current support surface: Standard  Standard gel/foam mattress (IsoFlex, Atmos air, etc)  Containment of urine/stool: Incontinent pad in bed and Ileostomy pouch  BMI: Body mass index is 37.18 kg/m .   Active diet order: Orders Placed This Encounter      Low Fiber Diet     Output: I/O last 3 completed shifts:  In: 3045 [P.O.:1245; I.V.:1800]  Out: 1440 [Urine:1050; Drains:190; Stool:200]     Labs:   Recent Labs   Lab 09/06/23  0657   HGB 11.1*   WBC 15.5*       Pressure injury risk assessment:   Sensory Perception: 3-->slightly limited  Moisture: 3-->occasionally moist  Activity: 3-->walks occasionally  Mobility: 3-->slightly limited  Nutrition: 2-->probably inadequate  Friction and Shear: 3-->no apparent problem  Lv Score: 17    Sara Medrano, RN, BSN, CWON  Pager no longer is use, please contact through [employer] group: [facility] Nurse [place]  Dept. Office Number: [phone]

## 2023-09-08 ENCOUNTER — APPOINTMENT (OUTPATIENT)
Dept: OCCUPATIONAL THERAPY | Facility: CLINIC | Age: 48
DRG: 330 | End: 2023-09-08
Attending: SURGERY
Payer: COMMERCIAL

## 2023-09-08 PROCEDURE — 120N000002 HC R&B MED SURG/OB UMMC

## 2023-09-08 PROCEDURE — 250N000013 HC RX MED GY IP 250 OP 250 PS 637: Performed by: STUDENT IN AN ORGANIZED HEALTH CARE EDUCATION/TRAINING PROGRAM

## 2023-09-08 PROCEDURE — 250N000013 HC RX MED GY IP 250 OP 250 PS 637: Performed by: PHYSICIAN ASSISTANT

## 2023-09-08 PROCEDURE — 250N000011 HC RX IP 250 OP 636: Mod: JZ | Performed by: STUDENT IN AN ORGANIZED HEALTH CARE EDUCATION/TRAINING PROGRAM

## 2023-09-08 PROCEDURE — G0463 HOSPITAL OUTPT CLINIC VISIT: HCPCS

## 2023-09-08 PROCEDURE — 97535 SELF CARE MNGMENT TRAINING: CPT | Mod: GO | Performed by: OCCUPATIONAL THERAPIST

## 2023-09-08 PROCEDURE — 99232 SBSQ HOSP IP/OBS MODERATE 35: CPT | Mod: GC

## 2023-09-08 PROCEDURE — 97530 THERAPEUTIC ACTIVITIES: CPT | Mod: GO | Performed by: OCCUPATIONAL THERAPIST

## 2023-09-08 PROCEDURE — 250N000013 HC RX MED GY IP 250 OP 250 PS 637

## 2023-09-08 PROCEDURE — 250N000011 HC RX IP 250 OP 636: Mod: JZ | Performed by: PHYSICIAN ASSISTANT

## 2023-09-08 RX ORDER — CLOBETASOL PROPIONATE 0.5 MG/G
1 CREAM TOPICAL 2 TIMES DAILY
Status: DISCONTINUED | OUTPATIENT
Start: 2023-09-08 | End: 2023-09-11 | Stop reason: HOSPADM

## 2023-09-08 RX ORDER — ACETAMINOPHEN 325 MG/1
975 TABLET ORAL EVERY 6 HOURS
Qty: 100 TABLET | Refills: 0 | Status: SHIPPED | OUTPATIENT
Start: 2023-09-08 | End: 2023-10-31

## 2023-09-08 RX ORDER — METHOCARBAMOL 750 MG/1
750 TABLET, FILM COATED ORAL 4 TIMES DAILY PRN
Qty: 40 TABLET | Refills: 0 | Status: SHIPPED | OUTPATIENT
Start: 2023-09-08 | End: 2023-10-02

## 2023-09-08 RX ORDER — HYDROXYZINE HYDROCHLORIDE 25 MG/1
25 TABLET, FILM COATED ORAL EVERY 6 HOURS PRN
Qty: 15 TABLET | Refills: 0 | Status: SHIPPED | OUTPATIENT
Start: 2023-09-08 | End: 2023-10-03

## 2023-09-08 RX ORDER — HYDROMORPHONE HCL IN WATER/PF 6 MG/30 ML
0.2 PATIENT CONTROLLED ANALGESIA SYRINGE INTRAVENOUS
Status: DISCONTINUED | OUTPATIENT
Start: 2023-09-08 | End: 2023-09-09

## 2023-09-08 RX ORDER — OXYCODONE HYDROCHLORIDE 10 MG/1
10 TABLET ORAL EVERY 4 HOURS PRN
Status: DISCONTINUED | OUTPATIENT
Start: 2023-09-08 | End: 2023-09-11

## 2023-09-08 RX ORDER — CLOBETASOL PROPIONATE 0.5 MG/G
1 CREAM TOPICAL 2 TIMES DAILY
COMMUNITY
Start: 2023-08-29 | End: 2023-09-12

## 2023-09-08 RX ADMIN — HYDROMORPHONE HYDROCHLORIDE 6 MG: 2 TABLET ORAL at 08:37

## 2023-09-08 RX ADMIN — OXYCODONE HYDROCHLORIDE 10 MG: 10 TABLET ORAL at 11:36

## 2023-09-08 RX ADMIN — OXYCODONE HYDROCHLORIDE 15 MG: 5 TABLET ORAL at 16:03

## 2023-09-08 RX ADMIN — OXYCODONE HYDROCHLORIDE 15 MG: 5 TABLET ORAL at 20:34

## 2023-09-08 RX ADMIN — FAMOTIDINE 20 MG: 20 TABLET ORAL at 08:36

## 2023-09-08 RX ADMIN — HYDROXYZINE HYDROCHLORIDE 50 MG: 50 TABLET ORAL at 11:47

## 2023-09-08 RX ADMIN — METHOCARBAMOL 750 MG: 750 TABLET ORAL at 03:49

## 2023-09-08 RX ADMIN — ACETAMINOPHEN 975 MG: 325 TABLET, FILM COATED ORAL at 17:57

## 2023-09-08 RX ADMIN — ACETAMINOPHEN 975 MG: 325 TABLET, FILM COATED ORAL at 06:17

## 2023-09-08 RX ADMIN — HYDROMORPHONE HYDROCHLORIDE 0.2 MG: 0.2 INJECTION, SOLUTION INTRAMUSCULAR; INTRAVENOUS; SUBCUTANEOUS at 21:48

## 2023-09-08 RX ADMIN — HYDROXYZINE HYDROCHLORIDE 50 MG: 50 TABLET ORAL at 03:36

## 2023-09-08 RX ADMIN — ACETAMINOPHEN 975 MG: 325 TABLET, FILM COATED ORAL at 23:53

## 2023-09-08 RX ADMIN — HYDROMORPHONE HYDROCHLORIDE 6 MG: 2 TABLET ORAL at 04:28

## 2023-09-08 RX ADMIN — HYDROMORPHONE HYDROCHLORIDE 0.2 MG: 0.2 INJECTION, SOLUTION INTRAMUSCULAR; INTRAVENOUS; SUBCUTANEOUS at 06:35

## 2023-09-08 RX ADMIN — CLOBETASOL PROPIONATE 1 G: 0.5 CREAM TOPICAL at 20:34

## 2023-09-08 RX ADMIN — HYDROXYZINE HYDROCHLORIDE 50 MG: 50 TABLET ORAL at 22:37

## 2023-09-08 RX ADMIN — HYDROMORPHONE HYDROCHLORIDE 0.2 MG: 0.2 INJECTION, SOLUTION INTRAMUSCULAR; INTRAVENOUS; SUBCUTANEOUS at 14:01

## 2023-09-08 RX ADMIN — VENLAFAXINE 150 MG: 75 TABLET ORAL at 23:50

## 2023-09-08 RX ADMIN — VENLAFAXINE 150 MG: 75 TABLET ORAL at 08:36

## 2023-09-08 RX ADMIN — HYDROMORPHONE HYDROCHLORIDE 0.2 MG: 0.2 INJECTION, SOLUTION INTRAMUSCULAR; INTRAVENOUS; SUBCUTANEOUS at 17:57

## 2023-09-08 RX ADMIN — ACETAMINOPHEN 975 MG: 325 TABLET, FILM COATED ORAL at 11:36

## 2023-09-08 RX ADMIN — METHOCARBAMOL 750 MG: 750 TABLET ORAL at 17:57

## 2023-09-08 RX ADMIN — METHOCARBAMOL 750 MG: 750 TABLET ORAL at 09:58

## 2023-09-08 RX ADMIN — FAMOTIDINE 20 MG: 20 TABLET ORAL at 20:34

## 2023-09-08 RX ADMIN — HYDROMORPHONE HYDROCHLORIDE 6 MG: 2 TABLET ORAL at 00:31

## 2023-09-08 RX ADMIN — ENOXAPARIN SODIUM 40 MG: 40 INJECTION SUBCUTANEOUS at 14:02

## 2023-09-08 ASSESSMENT — ACTIVITIES OF DAILY LIVING (ADL)
ADLS_ACUITY_SCORE: 28

## 2023-09-08 NOTE — PROGRESS NOTES
Pain Service Progress Note  [facility]  Date: 09/08/2023       Patient Name: Doretha Yu  MRN: [id number]  Age: 48 year old  Sex: female      Assessment:  Doretha Yu is a 48 year old female who has PMH ulcerative colitis s/p TAC with end ileostomy (laparoscopic) on 6/15/21 with subsequent parastomal hernia, inflammatory arthritis secondary to immunotherapy, chronic prednisone use, type 2 diabetes, obesity, history of CVA in 2004 after giving birth, prothrombin deficiency, obstructive sleep apnea, asthma, lymphedema, lumbago, chronic pain, fibromyalgia, depression, anxiety and insomnia. She is now s/p robotic proctectomy, ileostomy takedown, and creation of ileal J-pouch on 9/5/23.     Pain service was consulted to assist with pain management.  She is opioid naive at baseline.    She has chronic peripheral neuropathy, arthritis and fibromyalgia for which she uses medical cannabis-vapors for pain relief (certified by rheumatology at the [facility]).    She reports no improvement in her pain overnight and still endorses 8.5/10 lower abdominal pain.     Plan/Recommendations:    Change Dilaudid 4-6mg PO Q 4 hours PRN to oxycodone 10-15mg PO Q4h prn  Dilaudid 0.2-0.4mg IV Q 3 hours PRN.  Use oral oxycodone first.  Robaxin 500-750mg PO Q 6 hours PRN.  Continue acetaminophen.  Menthol patches as needed.  If okay by primary team, consider toradol 15mg IV Q 6 hours PRN. Max is 5 days.   Bowel regimen.  Wants to avoid additional medications, avoiding gabapentin and pregabalin due to weight gain / ineffectiveness.     Thank you for the opportunity to participate in the care of Doretha Yu  Pain Service will continue to follow.     Discussed with attending anesthesiologist  Primary Service Contacted with Recommendations? Yes      Poncho Wesley MD    09/08/2023       Diet: Low Fiber Diet  Diet    Relevant Labs:  Recent Labs   Lab Test 09/06/23  0657 01/13/23  0527 01/12/23  0736  "05/07/21  0544 05/06/21  1356   INR  --   --  0.95  --  0.96      < > 354   < > 308   PTT  --   --   --   --  23   BUN 8.7   < > 9.1   < > 19    < > = values in this interval not displayed.       Physical Exam:  Vitals: BP 99/65 (BP Location: Right arm)   Pulse 82   Temp 98.4  F (36.9  C) (Oral)   Resp 18   Ht 1.6 m (5' 3\")   Wt 95.2 kg (209 lb 14.1 oz)   SpO2 97%   BMI 37.18 kg/m      Physical Exam:     CONSTITUTIONAL/GENERAL APPEARANCE: Conversant.  EYES: EOMI, sclerae clear  ENT/NECK: neck is supple  RESPIRATORY:non labored breathing, on room air  CARDIOVASCULAR: HR within normal limits  GI:soft, nontender, has abdominal binder on, ostomy bag present with output.  MUSCULOSKELETAL/BACK/SPINE/EXTREMITIES: Moving UE and LE independently.     NEURO:  AAOx3.   SKIN/VASCULAR EXAM:  Dry and warm.  PSYCHIATRIC/BEHAVIORAL/OBSERVATIONS:  Verbalizes   Judgment/Insight -fair   Orientation - x3   Memory -fair   Mood and affect - calm, pleasant, cooperative         Relevant Medications:  Current Pain Medications:  Medications related to Pain Management (From now, onward)      Start     Dose/Rate Route Frequency Ordered Stop    09/07/23 0800  methocarbamol (ROBAXIN) tablet 750 mg         750 mg Oral 4 TIMES DAILY 09/06/23 2332      09/06/23 1125  acetaminophen (TYLENOL) tablet 975 mg         975 mg Oral EVERY 6 HOURS 09/06/23 0611      09/06/23 0932  HYDROmorphone (DILAUDID) injection 0.2-0.4 mg         0.2-0.4 mg Intravenous EVERY 2 HOURS PRN 09/06/23 0933      09/06/23 0931  HYDROmorphone (DILAUDID) tablet 2-4 mg         2-4 mg Oral EVERY 3 HOURS PRN 09/06/23 0933      09/06/23 0636  hydrOXYzine (ATARAX) tablet 25 mg        See Hyperspace for full Linked Orders Report.    25 mg Oral EVERY 6 HOURS PRN 09/06/23 0636      09/06/23 0636  hydrOXYzine (ATARAX) tablet 50 mg        See Hyperspace for full Linked Orders Report.    50 mg Oral EVERY 6 HOURS PRN 09/06/23 0636      09/05/23 1902  lidocaine 1 % 0.1-1 mL      " "   0.1-1 mL Other EVERY 1 HOUR PRN 09/05/23 1902      09/05/23 1902  lidocaine (LMX4) cream          Topical EVERY 1 HOUR PRN 09/05/23 1902      09/05/23 0650  BUPivacaine liposome (EXPAREL) LA inj was given in the infiltration site to produce post-op analgesia. Duration of action is up to 72 hours. Other \"aguilar\" meds should not be given for 96 hours except for lidocaine 4% patch. This is for INFORMATION ONLY.          Does not apply CONTINUOUS PRN 09/05/23 0650 09/09/23 0649            Primary Service Contacted with Recommendations? Yes    Acute Inpatient Pain Service [facility]  Hours of pain coverage 24/7   Page via [employer]- Please Page the Pain Team Via [employer]: \"PAIN MANAGEMENT ACUTE INPATIENT/ [facility] [place], [place]\"            "

## 2023-09-08 NOTE — PROGRESS NOTES
Care Management Follow Up    Length of Stay (days): 3    Expected Discharge Date: 09/11/2023     Concerns to be Addressed: discharge planning     Patient plan of care discussed at interdisciplinary rounds: Yes    Anticipated Discharge Disposition: Home, Home Care (declined)     Anticipated Discharge Services: None  Anticipated Discharge DME: Other (see comment) (Ostomy supplies)    Patient/family educated on Medicare website which has current facility and service quality ratings: yes  Education Provided on the Discharge Plan: Yes  Patient/Family in Agreement with the Plan: yes    Referrals Placed by CM/SW: Internal Clinic Care Coordination  Private pay costs discussed: Not applicable    Additional Information:  RNCC received VM from [facility] that  there are no accepting home care agencies for this patient. Referral was made for PT/OT and provider wanted to add SNV. RNCC called [facility] and requested tp know reason for declines.     -RNCC updated charge.  -RNCC paged colorectal resident if provider will approves patient to go home without home care. TBD.     NEXT: RNCC to follow for discharge planning  GUILLAUME 9/10    Lidia Zaman RNCC Float  Covering for 5A  Phone [phone]  Pager [phone]  Nurse Coordinator     Social Work and Care Management Department       Lidia Zaman

## 2023-09-08 NOTE — PLAN OF CARE
Goal Outcome Evaluation:    POD 3 robotic proctectomy, ileostomy takedown and re-siting, and creation of ileal J-pouch.    AVSS on room air. Pain was poorly managed on oral Dilaudid, but slightly improved with Oxycodone. Also taking IV Dilaudid, Tylenol and Robaxin. On a low fiber diet, denied nausea. Ileostomy with gas and stool. Takedown site repacked. Major removed and was able to void, however did have some urinary incontinence. MD notified in the morning of brown drainage that seemed to be from the vagina. Attending, resident and PA came to bedside. Pelvic exam completed, continue to monitor. In the afternoon, patient had more brown-maroon drainage resident and PA came back to assess patient. PIV saline locked. Up with assist of 1. Ambulated in hallway x2. Continue with plan of care - manage pain, monitor for vaginal and pouch/anal output, encourage activity.

## 2023-09-08 NOTE — PLAN OF CARE
Goal Outcome Evaluation:      Plan of Care Reviewed With: patient      AVSS, alert and oriented x 4. Colostomy with loose green output this shift, lacy catheter with adequate urine output. Ate 75% of dinner with a fair appetite. Had some snacks later on in the shift. Pain managed by PRN oral and IV dilaudid as well as atarax. Has take down sites, ileostomy and SHAY drain. PIV on right arm. Was able to stand up from the bed today despite pain, with assistance. No acute events this shift, continue with care as planned.

## 2023-09-08 NOTE — PROGRESS NOTES
[facility] Nurse Inpatient Assessment     Consulted for: new loop ileostomy RLQ      Patient History (according to Colorectal provider note(s) 9/6/23:      This is a 48 year old female with complex medical history to include ulcerative colitis s/p TAC with end ileostomy (laparoscopic) on 6/15/21 with subsequent parastomal hernia, inflammatory arthritis secondary to immunotherapy, chronic prednisone use, type 2 diabetes, obesity, history of CVA in 2004 after giving birth, prothrombin deficiency, obstructive sleep apnea, asthma, lymphedema, lumbago, chronic pain, depression, anxiety and insomnia. She is now s/p robotic proctectomy, ileostomy takedown, and creation of ileal J-pouch on 9/5/23 with Dr Ram.     Per OP note:   Doretha Yu is a 48-year-old lady who underwen total abdominal colectomy with end ileostomy by me in June 2021 for ulcerative pancolitis.  Thankfully, she recovered very well from this.  She has lost over 60 lbs in the past year and now is a much more appropriate candidate for this elective procedure.  Her ileostomy was functioning well with and she denied any pouching issues.  She denied any previous anorectal operations.  We had a long discussion with regards to a restorative proctocolectomy versus end ileostomy.  She elected to proceed with an ileal J-pouch.     Assessment:      Areas visualized during today's visit: Abdomen    Assessment of new loop Ileostomy:  Diagnosis Pertinent to Stoma: Ulcerative Colitis      Surgery Date: 9/5/23  Surgeon: Dr Ram      Hospital: [facility]  Pouching system in place on assessment today: Keith 70mm two piece, cut to fit, and flat   Pouch barrier status: intact and Minimal melting  Pouch last changed/wear time: 2 days, changed 9/8/23  Reason for pouch change today: ostomy education and routine schedule  Effectiveness of current pouching/ supply plan: Effective  Change made with ostomy management  "today: No  Pouching system placed today: Keith 57mm two piece, cut to fit, flat, barrier ring, and skin prep- stretched barrier ring out then pinched together at 3 and 9 O'clock to fill crease  Supplies: gathered, supplies stored on unit, discussed with RN, and discussed with patient (pt has precut barriers that are 1\"- will not fit right now but explained to pt that they may work in 4-6 weeks once swelling goes down)    Last photo: 9/6/23  Stoma location: RLQ (previous site on LLQ would not work so had to be moved)  Stoma size: 1 1/8 inches, slightly oval  Stoma appearance: red, oval, moist, edematous, and protruberant  Mucocutaneous junction:  intact, 9/7did not evaluate, pouch on  Peristomal complication(s): none   Output: liquid and brown,green  Output volume emptied during visit: 50 ml  Abdominal assessment: Distended  Surgical site(s): dressing dry and intact, changed per Colorectal team  NG still in place? No  Pain: Cramping and Sharp  Is patient still on a PCA? No    Ostomy education assessment:  Participant of teaching session today: patient   Education completed today: Stoma assessment, Pouching system assessment , Evaluate leakage issue, Importance of hydration, When to seek medical attention, and Discharge instructions  Educational materials/methods: Verbal, Demonstration, and Addressed patient/caregiver questions/concerns  Education still needed: Pouch change return demonstration and Discharge instructions, pt has had an ostomy previously- see HPI.   Learning Comprehension:   Psychosocial assessment: JUDAH- pt in a lot of pain today but has had an ileostomy in the past which was flat/retracted and she had no difficulty pouching it.   9/7 Reported she is comfortable with pouch changes, only had questions regarding supplies to go home with. Supplies provided and at bedside. Questions answered  Patient readiness for education today: attentive and in significant pain  Following today's visit: patient  is " "able to demonstrate;         1. How to empty their pouch? Yes        2. How to change their pouch? Yes        3. How to read and record intake and output correctly? Yes and with minimal assist- today due to pain  Preparation for discharge completed: No discharge preparation started yet, Ensured patient has extra supplies for discharge, Discussed how to order supplies after discharge , Discussed how and when to make an outpatient WOC nurse appointment after discharge, Prepared for discharge home with home care, and Discuss signs/symptoms of when to seek medical attention  Preparation for discharge still needed: Placed prescription recommendations in discharge navigator for MD to sign  Pt support system on discharge: parents  WO recommend home care? No  Face to face time: 15 minutes         Treatment Plan:     RLQ Ileostomy pouching plan:   Pouching system: ostomy supplies pouches: [place] 57 FECAL ([id number]) ostomy supplies barrier: [place] 57mm FLAT (820260)  Accessories used: [facility] ostomy accessories: 2\" Cera Barrier Ring (690362), Large Belt (495195), M9 Drops ([id number]), and Cavilon no sting barrier film (565051)   Frequency of pouch changes: PRN leakage and Three times a week  WOC follow up plan: Daily Monday-Friday (as able)  Bedside RN interventions: Change pouch PRN if leaking using the supplies above, Empty pouch when 1/3 to 1/2 full, ensure to clean pouch outlet after emptying to prevent odor, Notify WOC for ongoing pouch leakage, and Document stoma appearance and output volume, color, and consistency every shift     Orders: Reviewed    RECOMMEND PRIMARY TEAM ORDER: None, at this time  Education provided: plan of care  Discussed plan of care with: Patient and Nurse  WOC nurse follow-up plan: Monday/WednesdayFriday  Notify WOC if wound(s) deteriorate.  Nursing to notify the Provider(s) and re-consult the WOC Nurse if new skin concern.    DATA:     Current support surface: Standard  Standard gel/foam mattress " (IsoFlex, Atmos air, etc)  Containment of urine/stool: Incontinent pad in bed and Ileostomy pouch  BMI: Body mass index is 37.18 kg/m .   Active diet order: Orders Placed This Encounter      Low Fiber Diet      Diet     Output: I/O last 3 completed shifts:  In: 715 [P.O.:715]  Out: 2045 [Urine:1605; Drains:90; Stool:350]     Labs:   Recent Labs   Lab 09/06/23  0657   HGB 11.1*   WBC 15.5*       Pressure injury risk assessment:   Sensory Perception: 3-->slightly limited  Moisture: 4-->rarely moist  Activity: 1-->bedfast  Mobility: 2-->very limited  Nutrition: 2-->probably inadequate  Friction and Shear: 3-->no apparent problem  Lv Score: 15      Pager no longer is use, please contact through [employer]   Patricia group: [facility] Nurse [place]  Dept. Office Number: *[phone]

## 2023-09-08 NOTE — PLAN OF CARE
Goal Outcome Evaluation:  POD#3. Pt afebrile, vitals stable.Pt cont to have a lot of pain rating it an 8-9. Pt med with prn dilaudid po and iv, atarax, tylenol, robaxin and icy hot to RLQ. Pt stated her abd feels crampy and just painful. Abd obese, +bs, +gas, + stool out of ileostomy.200 ml of mucousy, green stool.SHAY with scant s/s drge. Dressing changed x1 for leakage at site. Major patent with good uo. Lungs clear dim in bases. Cont to maintain pain control so that pt will amb. Pt needs much encouragement to get OOB and move!. Cont with good pulm hygiene.

## 2023-09-08 NOTE — PROGRESS NOTES
Colorectal Surgery Progress Note  [facility]  POD#3      Subjective:    Doretha complains of ongoing pain, though she appears to be more comfortable than previous and endorses she has been getting out of bed. Encouraged to walk outside of her room this morning.    Vitals:  Vitals:    09/07/23 0600 09/07/23 1542 09/07/23 2202 09/08/23 0404   BP: 98/44 96/57 107/66 99/51   BP Location: Right arm Right arm Right arm Right arm   Pulse: 91 88 100 94   Resp: 18 20 20 20   Temp: 97.8  F (36.6  C) 98.4  F (36.9  C) 97.9  F (36.6  C) 99  F (37.2  C)   TempSrc: Oral Oral Axillary Oral   SpO2: 97% 99% 99% 95%   Weight:       Height:         I/O:  I/O last 3 completed shifts:  In: 715 [P.O.:715]  Out: 2045 [Urine:1605; Drains:90; Stool:350]    Physical Exam:  Gen: AAOx3, NAD  Pulm: Non-labored breathing  Abd: Soft, non-distended, significantly tender globally though particularly in the LLQ   Incision covered by partially saturated dressing, will replace this am   SHAY drain scant serosanguinous output  Ext:  Warm and well-perfused    BMP  Recent Labs   Lab 09/06/23  0657 09/05/23  2348 09/05/23  1727 09/05/23  0830    141  --   --    POTASSIUM 4.1 4.5  --   --    CHLORIDE 106 107  --   --    CO2 22 22  --   --    BUN 8.7 7.7  --   --    CR 0.87 0.78  --   --    * 138* 153* 110*   MAG 1.8  --   --   --    PHOS 4.9*  --   --   --      CBC  Recent Labs   Lab 09/06/23 0657 09/05/23 2348   WBC 15.5* 19.1*   HGB 11.1* 11.1*   HCT 36.2 35.8    345         ASSESSMENT:   This is a 48 year old female with complex medical history to include ulcerative colitis s/p TAC with end ileostomy (laparoscopic) on 6/15/21 with subsequent parastomal hernia, inflammatory arthritis secondary to immunotherapy, chronic prednisone use, type 2 diabetes, obesity, history of CVA in 2004 after giving birth, prothrombin deficiency, obstructive sleep apnea, asthma, lymphedema, lumbago, chronic pain, depression,  anxiety and insomnia. She is now s/p robotic proctectomy, ileostomy takedown, and creation of ileal J-pouch. Postoperative course so far complicated by uncontrolled pain.    Neuro/Pain: Transitioned to oral pain regimen with PO dilaudid 2-4mg q3h, will attempt to wean PRN IV dilaudid. Increased APAP to 975mg Q6h. Atarax PRN as adjunctive. Robaxin 500mg QID.  Pain team following, appreciate recommendations.  PULM: encourage IS.  GI/FEN: LRD  : Remove lacy  Heme: Hemoglobin stable post-op  Ppx: Lovenox 40mg daily  Activity: as tolerated, must get OOB, ambulate  Dispo: POD3-5 pending SEANF      Patient was seen and discussed with Dr. Rodriguez.    Lowell Bowman MD  General Surgery PGY-1  Colon and Rectal Surgery  Pager: [phone]      Addendum:   No lovenox prophylaxis nor SHAY drain on discharge.  Will remove SHAY Drain on the day of discharge.  Discussed with attending, Dr. Ram.

## 2023-09-09 ENCOUNTER — APPOINTMENT (OUTPATIENT)
Dept: OCCUPATIONAL THERAPY | Facility: CLINIC | Age: 48
DRG: 330 | End: 2023-09-09
Attending: SURGERY
Payer: COMMERCIAL

## 2023-09-09 LAB
MAGNESIUM SERPL-MCNC: 1.7 MG/DL (ref 1.7–2.3)
POTASSIUM SERPL-SCNC: 3.2 MMOL/L (ref 3.4–5.3)

## 2023-09-09 PROCEDURE — 84132 ASSAY OF SERUM POTASSIUM: CPT | Performed by: STUDENT IN AN ORGANIZED HEALTH CARE EDUCATION/TRAINING PROGRAM

## 2023-09-09 PROCEDURE — 36415 COLL VENOUS BLD VENIPUNCTURE: CPT | Performed by: STUDENT IN AN ORGANIZED HEALTH CARE EDUCATION/TRAINING PROGRAM

## 2023-09-09 PROCEDURE — 250N000011 HC RX IP 250 OP 636

## 2023-09-09 PROCEDURE — 250N000013 HC RX MED GY IP 250 OP 250 PS 637: Performed by: SURGERY

## 2023-09-09 PROCEDURE — 97530 THERAPEUTIC ACTIVITIES: CPT | Mod: GO

## 2023-09-09 PROCEDURE — 250N000013 HC RX MED GY IP 250 OP 250 PS 637

## 2023-09-09 PROCEDURE — 250N000013 HC RX MED GY IP 250 OP 250 PS 637: Performed by: STUDENT IN AN ORGANIZED HEALTH CARE EDUCATION/TRAINING PROGRAM

## 2023-09-09 PROCEDURE — 99231 SBSQ HOSP IP/OBS SF/LOW 25: CPT

## 2023-09-09 PROCEDURE — 120N000002 HC R&B MED SURG/OB UMMC

## 2023-09-09 PROCEDURE — 83735 ASSAY OF MAGNESIUM: CPT | Performed by: STUDENT IN AN ORGANIZED HEALTH CARE EDUCATION/TRAINING PROGRAM

## 2023-09-09 PROCEDURE — 250N000011 HC RX IP 250 OP 636: Mod: JZ | Performed by: PHYSICIAN ASSISTANT

## 2023-09-09 PROCEDURE — 250N000011 HC RX IP 250 OP 636: Performed by: STUDENT IN AN ORGANIZED HEALTH CARE EDUCATION/TRAINING PROGRAM

## 2023-09-09 PROCEDURE — 97535 SELF CARE MNGMENT TRAINING: CPT | Mod: GO

## 2023-09-09 RX ORDER — MAGNESIUM OXIDE 400 MG/1
400 TABLET ORAL EVERY 4 HOURS
Status: COMPLETED | OUTPATIENT
Start: 2023-09-09 | End: 2023-09-10

## 2023-09-09 RX ORDER — HYDROMORPHONE HYDROCHLORIDE 1 MG/ML
0.3 INJECTION, SOLUTION INTRAMUSCULAR; INTRAVENOUS; SUBCUTANEOUS ONCE
Status: COMPLETED | OUTPATIENT
Start: 2023-09-09 | End: 2023-09-09

## 2023-09-09 RX ORDER — METHOCARBAMOL 500 MG/1
1000 TABLET, FILM COATED ORAL 4 TIMES DAILY PRN
Status: DISCONTINUED | OUTPATIENT
Start: 2023-09-09 | End: 2023-09-10

## 2023-09-09 RX ORDER — POTASSIUM CHLORIDE 750 MG/1
40 TABLET, EXTENDED RELEASE ORAL ONCE
Status: COMPLETED | OUTPATIENT
Start: 2023-09-09 | End: 2023-09-09

## 2023-09-09 RX ORDER — SIMETHICONE 80 MG
80 TABLET,CHEWABLE ORAL EVERY 6 HOURS PRN
Status: DISCONTINUED | OUTPATIENT
Start: 2023-09-09 | End: 2023-09-11 | Stop reason: HOSPADM

## 2023-09-09 RX ADMIN — CLOBETASOL PROPIONATE 1 G: 0.5 CREAM TOPICAL at 09:28

## 2023-09-09 RX ADMIN — ENOXAPARIN SODIUM 40 MG: 40 INJECTION SUBCUTANEOUS at 18:01

## 2023-09-09 RX ADMIN — HYDROXYZINE HYDROCHLORIDE 50 MG: 50 TABLET ORAL at 09:44

## 2023-09-09 RX ADMIN — HYDROMORPHONE HYDROCHLORIDE 0.2 MG: 0.2 INJECTION, SOLUTION INTRAMUSCULAR; INTRAVENOUS; SUBCUTANEOUS at 00:54

## 2023-09-09 RX ADMIN — OXYCODONE HYDROCHLORIDE 15 MG: 5 TABLET ORAL at 14:03

## 2023-09-09 RX ADMIN — HYDROXYZINE HYDROCHLORIDE 50 MG: 50 TABLET ORAL at 22:28

## 2023-09-09 RX ADMIN — METHOCARBAMOL 750 MG: 750 TABLET ORAL at 05:56

## 2023-09-09 RX ADMIN — SIMETHICONE 80 MG: 80 TABLET, CHEWABLE ORAL at 16:05

## 2023-09-09 RX ADMIN — SIMETHICONE 80 MG: 80 TABLET, CHEWABLE ORAL at 09:44

## 2023-09-09 RX ADMIN — HYDROXYZINE HYDROCHLORIDE 50 MG: 50 TABLET ORAL at 16:04

## 2023-09-09 RX ADMIN — HYDROMORPHONE HYDROCHLORIDE 0.2 MG: 0.2 INJECTION, SOLUTION INTRAMUSCULAR; INTRAVENOUS; SUBCUTANEOUS at 03:54

## 2023-09-09 RX ADMIN — OXYCODONE HYDROCHLORIDE 15 MG: 5 TABLET ORAL at 05:56

## 2023-09-09 RX ADMIN — OXYCODONE HYDROCHLORIDE 15 MG: 5 TABLET ORAL at 18:51

## 2023-09-09 RX ADMIN — ACETAMINOPHEN 975 MG: 325 TABLET, FILM COATED ORAL at 14:03

## 2023-09-09 RX ADMIN — ONDANSETRON 4 MG: 4 TABLET, ORALLY DISINTEGRATING ORAL at 03:58

## 2023-09-09 RX ADMIN — OXYCODONE HYDROCHLORIDE 15 MG: 5 TABLET ORAL at 10:11

## 2023-09-09 RX ADMIN — HYDROMORPHONE HYDROCHLORIDE 0.3 MG: 1 INJECTION, SOLUTION INTRAMUSCULAR; INTRAVENOUS; SUBCUTANEOUS at 18:01

## 2023-09-09 RX ADMIN — POTASSIUM CHLORIDE 40 MEQ: 750 TABLET, EXTENDED RELEASE ORAL at 19:58

## 2023-09-09 RX ADMIN — OXYCODONE HYDROCHLORIDE 15 MG: 5 TABLET ORAL at 01:52

## 2023-09-09 RX ADMIN — ACETAMINOPHEN 975 MG: 325 TABLET, FILM COATED ORAL at 06:42

## 2023-09-09 RX ADMIN — METHOCARBAMOL 750 MG: 750 TABLET ORAL at 14:03

## 2023-09-09 RX ADMIN — VENLAFAXINE 150 MG: 75 TABLET ORAL at 19:59

## 2023-09-09 RX ADMIN — METHOCARBAMOL 750 MG: 750 TABLET ORAL at 00:01

## 2023-09-09 RX ADMIN — FAMOTIDINE 20 MG: 20 TABLET ORAL at 09:28

## 2023-09-09 RX ADMIN — OXYCODONE HYDROCHLORIDE 15 MG: 5 TABLET ORAL at 22:29

## 2023-09-09 RX ADMIN — VENLAFAXINE 150 MG: 75 TABLET ORAL at 09:28

## 2023-09-09 RX ADMIN — METHOCARBAMOL 1000 MG: 500 TABLET, FILM COATED ORAL at 21:30

## 2023-09-09 RX ADMIN — FAMOTIDINE 20 MG: 20 TABLET ORAL at 19:59

## 2023-09-09 RX ADMIN — ACETAMINOPHEN 975 MG: 325 TABLET, FILM COATED ORAL at 19:59

## 2023-09-09 RX ADMIN — MAGNESIUM OXIDE TAB 400 MG (241.3 MG ELEMENTAL MG) 400 MG: 400 (241.3 MG) TAB at 19:59

## 2023-09-09 ASSESSMENT — ACTIVITIES OF DAILY LIVING (ADL)
ADLS_ACUITY_SCORE: 29
ADLS_ACUITY_SCORE: 28
ADLS_ACUITY_SCORE: 29
ADLS_ACUITY_SCORE: 25
ADLS_ACUITY_SCORE: 29
ADLS_ACUITY_SCORE: 28
ADLS_ACUITY_SCORE: 29

## 2023-09-09 NOTE — PROGRESS NOTES
COLON & RECTAL SURGERY PROGRESS NOTE  POD# 4    S:  feels well this AM, pain persistent but better controlled. Tolerating diet, DLI functioning. Concern yesterday for drainage per vagina -- pt has not noted any overnight. Motivated to get OOB, shower today.     Vitals:  Vitals:    09/08/23 1503 09/08/23 2231 09/09/23 0647 09/09/23 0718   BP: 91/57 111/68 99/58 99/53   BP Location: Right arm Right arm Right arm Right arm   Cuff Size:   Adult Large    Pulse: 82 91 87 82   Resp: 16 16 16 16   Temp: 97.8  F (36.6  C) 98.9  F (37.2  C) 98  F (36.7  C) 97.9  F (36.6  C)   TempSrc: Oral Oral Oral Oral   SpO2: 95% 90% 93% 90%   Weight:       Height:         I/O:  I/O last 3 completed shifts:  In: 280 [P.O.:280]  Out: 1642.5 [Urine:1150; Drains:7.5; Stool:485]    PE:  Gen:      AAOx3, NAD  Pulm:    Non-labored breathing  Abd:      Soft, non-distended, minimally tender throughout               Incision clean and dry, ostomy pink and patent, SHAY scant sliding scale   Ext:       Warm and well-perfused    Labs:  Recent Labs   Lab 09/06/23  0657 09/05/23  2348 09/05/23  1727 09/05/23  0830   WBC 15.5* 19.1*  --   --    HGB 11.1* 11.1*  --   --     345  --   --     141  --   --    POTASSIUM 4.1 4.5  --   --    CHLORIDE 106 107  --   --    CO2 22 22  --   --    BUN 8.7 7.7  --   --    CR 0.87 0.78  --   --    * 138* 153* 110*   MAG 1.8  --   --   --    PHOS 4.9*  --   --   --      A/P: 48 year old female with complex medical history to include ulcerative colitis s/p TAC with end ileostomy (laparoscopic) on 6/15/21 with subsequent parastomal hernia now s/p robotic proctectomy, ileostomy takedown, creation of ileal J-pouch and DLI on 09/06.     She is doing well, progressing towards discharge. Encouraging regular diet, monitor ostomy output. Will stop IV pain medications today and attempt transition to only PO meds. Voiding. SHAY will be removed prior to discharge. On DVT PPX.     Discussed with Dr. Ram.      Madeline Triplett MD  Colorectal Surgery Fellow

## 2023-09-09 NOTE — PLAN OF CARE
Goal Outcome Evaluation:    POD 4 robotic proctectomy, ileostomy takedown and re-siting, and creation of ileal J-pouch.     AVSS on room air. Tolerated a low fiber/gluten free diet. Reported inadequate pain management - received a one-time IV Dilaudid dose. Also utilizing Oxycodone, Tylenol, Robaxin, Simethicone and Atarax. Ileostomy with lots of gas and a small amount of stool. SHAY with serosanguinous drainage. Takedown site dressing CDI. Voided spontaneously. Up with assist of 1. Showered. Ambulated in hallway. Patient anxious and frustrated with pain plan, and also upon hearing that so far no home care agencies have accepted her. Needs potassium and magnesium replacements. Emotional support offered. [place] encouraged. Continue with plan of care - encourage ambulation, manage pain

## 2023-09-09 NOTE — PLAN OF CARE
Goal Outcome Evaluation:      Plan of Care Reviewed With: patient    POD#4 robotic proctectomy, ileostomy takedown and re-siting, and creation of ileal J-pouch.    A/Ox4. VSS, on room air. Intermittent nausea managed with oral Zofran. SBA to bathroom. Voids spont. Pain managed with IV Dilaudid, Robaxin, Oxycodone, Tylenol, and Ice packs. Intermittent Anxiety managed with PRN Atrax. Takedown site with packing. Ileostomy with gas and stool. PIV SL. Cont POC.    Start a Vitamin B super complex daily     Add 5 -10 more grams of protein (eggs, tuna, nuts, seeds)  Add another cup of fruit  Also healthy fats -- olive oil, avocado    Wait 4 weeks before getting Shingrix started

## 2023-09-10 ENCOUNTER — APPOINTMENT (OUTPATIENT)
Dept: OCCUPATIONAL THERAPY | Facility: CLINIC | Age: 48
DRG: 330 | End: 2023-09-10
Attending: SURGERY
Payer: COMMERCIAL

## 2023-09-10 LAB
HOLD SPECIMEN: NORMAL
MAGNESIUM SERPL-MCNC: 1.9 MG/DL (ref 1.7–2.3)
PLATELET # BLD AUTO: 393 10E3/UL (ref 150–450)
POTASSIUM SERPL-SCNC: 3.9 MMOL/L (ref 3.4–5.3)

## 2023-09-10 PROCEDURE — 250N000013 HC RX MED GY IP 250 OP 250 PS 637

## 2023-09-10 PROCEDURE — 36415 COLL VENOUS BLD VENIPUNCTURE: CPT | Performed by: STUDENT IN AN ORGANIZED HEALTH CARE EDUCATION/TRAINING PROGRAM

## 2023-09-10 PROCEDURE — 97535 SELF CARE MNGMENT TRAINING: CPT | Mod: GO

## 2023-09-10 PROCEDURE — 99231 SBSQ HOSP IP/OBS SF/LOW 25: CPT | Mod: GC

## 2023-09-10 PROCEDURE — 120N000002 HC R&B MED SURG/OB UMMC

## 2023-09-10 PROCEDURE — 83735 ASSAY OF MAGNESIUM: CPT | Performed by: STUDENT IN AN ORGANIZED HEALTH CARE EDUCATION/TRAINING PROGRAM

## 2023-09-10 PROCEDURE — 999N000147 HC STATISTIC PT IP EVAL DEFER

## 2023-09-10 PROCEDURE — 84132 ASSAY OF SERUM POTASSIUM: CPT | Performed by: SURGERY

## 2023-09-10 PROCEDURE — 250N000011 HC RX IP 250 OP 636: Mod: JZ | Performed by: PHYSICIAN ASSISTANT

## 2023-09-10 PROCEDURE — 85049 AUTOMATED PLATELET COUNT: CPT

## 2023-09-10 PROCEDURE — 250N000013 HC RX MED GY IP 250 OP 250 PS 637: Performed by: STUDENT IN AN ORGANIZED HEALTH CARE EDUCATION/TRAINING PROGRAM

## 2023-09-10 PROCEDURE — 250N000013 HC RX MED GY IP 250 OP 250 PS 637: Performed by: SURGERY

## 2023-09-10 PROCEDURE — 97530 THERAPEUTIC ACTIVITIES: CPT | Mod: GO

## 2023-09-10 RX ORDER — HYDROMORPHONE HCL IN WATER/PF 6 MG/30 ML
0.2 PATIENT CONTROLLED ANALGESIA SYRINGE INTRAVENOUS ONCE
Status: DISCONTINUED | OUTPATIENT
Start: 2023-09-10 | End: 2023-09-10

## 2023-09-10 RX ORDER — MAGNESIUM OXIDE 400 MG/1
400 TABLET ORAL EVERY 4 HOURS
Status: COMPLETED | OUTPATIENT
Start: 2023-09-10 | End: 2023-09-10

## 2023-09-10 RX ORDER — METHOCARBAMOL 500 MG/1
500 TABLET, FILM COATED ORAL 4 TIMES DAILY PRN
Status: DISCONTINUED | OUTPATIENT
Start: 2023-09-10 | End: 2023-09-10

## 2023-09-10 RX ORDER — METHOCARBAMOL 750 MG/1
750 TABLET, FILM COATED ORAL 4 TIMES DAILY PRN
Status: DISCONTINUED | OUTPATIENT
Start: 2023-09-10 | End: 2023-09-11

## 2023-09-10 RX ADMIN — MAGNESIUM OXIDE TAB 400 MG (241.3 MG ELEMENTAL MG) 400 MG: 400 (241.3 MG) TAB at 02:31

## 2023-09-10 RX ADMIN — FAMOTIDINE 20 MG: 20 TABLET ORAL at 19:48

## 2023-09-10 RX ADMIN — VENLAFAXINE 150 MG: 75 TABLET ORAL at 09:33

## 2023-09-10 RX ADMIN — HYDROXYZINE HYDROCHLORIDE 25 MG: 25 TABLET, FILM COATED ORAL at 18:08

## 2023-09-10 RX ADMIN — FAMOTIDINE 20 MG: 20 TABLET ORAL at 09:33

## 2023-09-10 RX ADMIN — OXYCODONE HYDROCHLORIDE 15 MG: 5 TABLET ORAL at 23:45

## 2023-09-10 RX ADMIN — METHOCARBAMOL 750 MG: 750 TABLET ORAL at 11:42

## 2023-09-10 RX ADMIN — METHOCARBAMOL 1000 MG: 500 TABLET, FILM COATED ORAL at 05:25

## 2023-09-10 RX ADMIN — ACETAMINOPHEN 975 MG: 325 TABLET, FILM COATED ORAL at 02:30

## 2023-09-10 RX ADMIN — OXYCODONE HYDROCHLORIDE 15 MG: 5 TABLET ORAL at 19:49

## 2023-09-10 RX ADMIN — MAGNESIUM OXIDE TAB 400 MG (241.3 MG ELEMENTAL MG) 400 MG: 400 (241.3 MG) TAB at 11:42

## 2023-09-10 RX ADMIN — HYDROXYZINE HYDROCHLORIDE 50 MG: 50 TABLET ORAL at 11:42

## 2023-09-10 RX ADMIN — ACETAMINOPHEN 975 MG: 325 TABLET, FILM COATED ORAL at 09:33

## 2023-09-10 RX ADMIN — VENLAFAXINE 150 MG: 75 TABLET ORAL at 19:49

## 2023-09-10 RX ADMIN — HYDROXYZINE HYDROCHLORIDE 50 MG: 50 TABLET ORAL at 05:25

## 2023-09-10 RX ADMIN — CLOBETASOL PROPIONATE 1 G: 0.5 CREAM TOPICAL at 19:52

## 2023-09-10 RX ADMIN — ENOXAPARIN SODIUM 40 MG: 40 INJECTION SUBCUTANEOUS at 13:32

## 2023-09-10 RX ADMIN — OXYCODONE HYDROCHLORIDE 15 MG: 5 TABLET ORAL at 02:30

## 2023-09-10 RX ADMIN — OXYCODONE HYDROCHLORIDE 15 MG: 5 TABLET ORAL at 11:03

## 2023-09-10 RX ADMIN — SIMETHICONE 80 MG: 80 TABLET, CHEWABLE ORAL at 15:49

## 2023-09-10 RX ADMIN — OXYCODONE HYDROCHLORIDE 15 MG: 5 TABLET ORAL at 06:57

## 2023-09-10 RX ADMIN — MAGNESIUM OXIDE TAB 400 MG (241.3 MG ELEMENTAL MG) 400 MG: 400 (241.3 MG) TAB at 15:45

## 2023-09-10 RX ADMIN — ACETAMINOPHEN 975 MG: 325 TABLET, FILM COATED ORAL at 15:45

## 2023-09-10 RX ADMIN — METHOCARBAMOL 750 MG: 750 TABLET ORAL at 18:08

## 2023-09-10 RX ADMIN — SIMETHICONE 80 MG: 80 TABLET, CHEWABLE ORAL at 09:33

## 2023-09-10 RX ADMIN — ACETAMINOPHEN 975 MG: 325 TABLET, FILM COATED ORAL at 22:06

## 2023-09-10 RX ADMIN — OXYCODONE HYDROCHLORIDE 10 MG: 10 TABLET ORAL at 15:45

## 2023-09-10 ASSESSMENT — ACTIVITIES OF DAILY LIVING (ADL)
ADLS_ACUITY_SCORE: 28
ADLS_ACUITY_SCORE: 29
ADLS_ACUITY_SCORE: 28
ADLS_ACUITY_SCORE: 29
ADLS_ACUITY_SCORE: 28
ADLS_ACUITY_SCORE: 28
ADLS_ACUITY_SCORE: 29

## 2023-09-10 NOTE — PROGRESS NOTES
"Care Management Follow Up    Length of Stay (days): 5    Expected Discharge Date: 09/11/2023     Concerns to be Addressed: Discharge planning     Patient plan of care discussed at interdisciplinary rounds: Yes    Anticipated Discharge Disposition:  Home, Home Care     Anticipated Discharge Services:  None  Anticipated Discharge DME: Ostomy supplies (see WOC note). Wound dressing supplies    Patient/family educated on Medicare website which has current facility and service quality ratings: yes  Education Provided on the Discharge Plan: Yes  Patient/Family in Agreement with the Plan: yes    Referrals Placed by CM/SW: Internal Clinic Care Coordination  Private pay costs discussed: Not applicable    Additional Information:  Yesterday informed by nursing pt is anticipating daily home care nurse visits. Informed nurse daily home nurse visits are very unlikely. Nursing will work with pt on having her do own wound care. Nurse reported wound is small, using 1/4\" packing strips and 4x4 gauze. I will speak with pt. Nurse thought pt should be able to do dressing on her own.   Spoke with [facility] yesterday about status of home care referrals. They made 12 referrals and none accepted. Denials were due to either insurance or staffing.   _________________________    Informed by therapy today pt was able to ambulate 500 feet; did stairs. Pt asking about home care.   Informed by pt's nurse, pt is asking about home care, family is here.     Introduced myself to pt; her father, Mauro Yu & his wife, Eunice Falk. Explained I help with discharge planning. Pt alert, resting in bed. Informed pt 12 home care agencies have been contacted and none have accepted, either due to insurance or staffing. Pt saying she needs a nurse every day. [facility] and [facility] every day for weeks. Informed pt home care availability has changed; daily home care nurse visits are not going to be possible. " "Eunice asked about a HHA. I said if we get an accepting agency we could add a HHA to the order, they cannot do meds or wound cares. Explained the nurse visit is only about an hour. Home care does not do housekeeping chores. They expressed understanding. Pt's father offered to come and help her with meals or cleaning, not personal or wound cares. Pt declined his offer of help. Pt lives with her 16 year old daughter.   Pt said she is not able to do her abdominal cares. Pt was very adamant about this; said she weighs 200 pounds and is not able to see the wound. This was in contrast to the [facility] note on 9-08 but I did not mention this to pt.   Per WOC note on 9-08: \"9/7 Reported she is comfortable with pouch changes, only had questions regarding supplies to go home with. Supplies provided and at bedside. Questions answered.\"    Pt said she spoke with her primary insurance yesterday, [employer]. They told her she has coverage for home care. They provided pt with a list of home care agencies: [facility]; [facility]; [facility]; [facility]; [facility]; [facility]; [facility].   Pt said she called [facility] and spoke with Chantelle; they are willing to look at referral. Informed pt we can make referrals to those agencies that haven't been contacted yet.   Pt frustrated because she is gluten free and the dietary options are limited. Said she has been eating Chex & chips. This AM had eggs and joel.   Pt with complaints about nursing on the night shift for the last few nights. Said they don't wake her up at night for pain meds. Informed pt typically nursing wound not wake someone up to give them pain meds. Instructed pt to speak with the Nurse Manager tomorrow (today is Sunday). I will also notify the Charge Nurse. Pt said the Pain team was here yesterday. Spoke with pt's nurse today and she is aware of situation.   Pt said she needs another surgery in 5 weeks. Became teary talking about it.   At " the end of my visit reviewed we don't have a home care agency yet. If we do get an agency then daily visits are most unlikely. Pt said she doesn't have anyone else to help her. [facility] declined a TCU stay. Pt aware discharge may be tomorrow. Family asked her how she is getting home, if she can go by car. Pt declined their assist with transport. Pt declined any help from her father or his wife.   ___________________________________________      The following home care agencies have declined:  [facility]  [facility]  [facility]  [facility]  [facility], [facility]  [facility], [facility], [facility], [facility]  [facility]      Plan:  --Make referrals to home care agencies pt received from her insurance (that haven't been contacted yet):  [facility]; [facility]; [facility].   --Contact home care agencies that declined pt due to staffing, see when they will have staffing available.   --Ask [facility] nurse to speak with pt and review her concerns about ability to do ostomy/wound cares.  --Nursing to continue working with pt on doing wound dressing change.   --Consider outpt appts with [facility] to assess wound/ostomy if not able to obtain home care.        Racquel Mac, RN Care Coordinator  [facility]  On 9- Weekend RNCC coverage for Units 5A, 5B & 5C (non-BMT). Weekend Pager: [phone].

## 2023-09-10 NOTE — PLAN OF CARE
Physical Therapy: Orders received. Chart reviewed and discussed with care team.? Physical Therapy not indicated due to patient mobilizing well, OT following, able to ambulate community distances SBA and complete stairs, limited mostly by pain. Anticipate all IP Therapy needs can be met by OT.? Defer discharge recommendations to OT.? Will complete orders.

## 2023-09-10 NOTE — PROGRESS NOTES
Pain Service Progress Note  [facility]  Date: 09/10/2023       Patient Name: Doretha Yu  MRN: [id number]  Age: 48 year old  Sex: female      Assessment:  Doretha Yu is a 48 year old female who has PMH ulcerative colitis s/p TAC with end ileostomy (laparoscopic) on 6/15/21 with subsequent parastomal hernia, inflammatory arthritis secondary to immunotherapy, chronic prednisone use, type 2 diabetes, obesity, history of CVA in 2004 after giving birth, prothrombin deficiency, obstructive sleep apnea, asthma, lymphedema, lumbago, chronic pain, fibromyalgia, depression, anxiety and insomnia. She is now s/p robotic proctectomy, ileostomy takedown, and creation of ileal J-pouch on 9/5/23.     Pain service was consulted to assist with pain management.  She is opioid naive at baseline.    She has chronic peripheral neuropathy, arthritis and fibromyalgia for which she uses medical cannabis-vapors for pain relief (certified by rheumatology at the [facility]).    She reports no improvement in her pain overnight and still endorses 8.5/10 lower abdominal pain.     Plan/Recommendations:    Schedule oxycodone 10-15mg PO Q4h prn, and she could refuse if not in pain.   Dilaudid 0.2-0.4mg IV Q 3 hours PRN for severe breakthrough.  It has been discontinued and the patient states she has more pain  Schedule Robaxin 500-750mg PO Q 6 hours and she could refuse if not in pain.  Continue acetaminophen.  Bowel regimen.         Pain Service will continue to follow.   Primary Service Contacted with Recommendations? No    Danisha Palencia MD  Anesthesiology, CA-3   Discussed with attending anesthesiologist.     09/10/2023       Diet: Low Fiber Diet  Diet    Relevant Labs:  Recent Labs   Lab Test 09/06/23  0657 01/13/23  0527 01/12/23  0736 05/07/21  0544 05/06/21  1356   INR  --   --  0.95  --  0.96      < > 354   < > 308   PTT  --   --   --   --  23   BUN 8.7   < > 9.1   < > 19    < > = values in this  interval not displayed.       Physical Exam:  Temp: 36.7  C (98  F) Temp src: Oral BP: 94/53 Pulse: 84   Resp: 14 SpO2: 96 % O2 Device: None (Room air)      Physical Exam:     CONSTITUTIONAL/GENERAL APPEARANCE: Conversant.  EYES: EOMI, sclerae clear  ENT/NECK: neck is supple  RESPIRATORY:non labored breathing, on room air  CARDIOVASCULAR: HR within normal limits  GI:soft, nontender, has abdominal binder on, ostomy bag present with output.  MUSCULOSKELETAL/BACK/SPINE/EXTREMITIES: Moving UE and LE independently.     NEURO:  AAOx3.   SKIN/VASCULAR EXAM:  Dry and warm.  PSYCHIATRIC/BEHAVIORAL/OBSERVATIONS:  Verbalizes   Judgment/Insight -fair   Orientation - x3   Memory -fair   Mood and affect - calm, pleasant, cooperative         Relevant Medications:  Current Pain Medications:  Medications related to Pain Management (From now, onward)      Start     Dose/Rate Route Frequency Ordered Stop    09/07/23 0800  methocarbamol (ROBAXIN) tablet 750 mg         750 mg Oral 4 TIMES DAILY 09/06/23 2332      09/06/23 1125  acetaminophen (TYLENOL) tablet 975 mg         975 mg Oral EVERY 6 HOURS 09/06/23 0611      09/06/23 0932  HYDROmorphone (DILAUDID) injection 0.2-0.4 mg         0.2-0.4 mg Intravenous EVERY 2 HOURS PRN 09/06/23 0933      09/06/23 0931  HYDROmorphone (DILAUDID) tablet 2-4 mg         2-4 mg Oral EVERY 3 HOURS PRN 09/06/23 0933      09/06/23 0636  hydrOXYzine (ATARAX) tablet 25 mg        See Hyperspace for full Linked Orders Report.    25 mg Oral EVERY 6 HOURS PRN 09/06/23 0636      09/06/23 0636  hydrOXYzine (ATARAX) tablet 50 mg        See Hyperspace for full Linked Orders Report.    50 mg Oral EVERY 6 HOURS PRN 09/06/23 0636      09/05/23 1902  lidocaine 1 % 0.1-1 mL         0.1-1 mL Other EVERY 1 HOUR PRN 09/05/23 1902      09/05/23 1902  lidocaine (LMX4) cream          Topical EVERY 1 HOUR PRN 09/05/23 1902      09/05/23 0650  BUPivacaine liposome (EXPAREL) LA inj was given in the infiltration site to produce  "post-op analgesia. Duration of action is up to 72 hours. Other \"aguilar\" meds should not be given for 96 hours except for lidocaine 4% patch. This is for INFORMATION ONLY.          Does not apply CONTINUOUS PRN 09/05/23 0650 09/09/23 0649            Primary Service Contacted with Recommendations? Yes    Acute Inpatient Pain Service [facility]  Hours of pain coverage 24/7   Page via Amcom- Please Page the Pain Team Via [facility]: \"PAIN MANAGEMENT ACUTE INPATIENT/ [facility]\"            "

## 2023-09-10 NOTE — PLAN OF CARE
D AVSS with sat's 91% on room air. Heart regular and lungs decreased in bases otherwise clear. Pain has been an issue for arlen all evening and overnight despite giving frequent pain med's. See flowsheet for SHAY/Ileostomy and urine output. Make frequent requests for various things and is very demanding of staffs time.   I Vital's, assessment and med's per order.  A Resting in bed with call light in reach.   P Continue to monitor and update MD with changes.

## 2023-09-10 NOTE — PROGRESS NOTES
Pain Service Progress Note  [facility]  Date: 09/09/2023       Patient Name: Doretha Yu  MRN: [id number]  Age: 48 year old  Sex: female      Assessment:  Doretha Yu is a 48 year old female who has PMH ulcerative colitis s/p TAC with end ileostomy (laparoscopic) on 6/15/21 with subsequent parastomal hernia, inflammatory arthritis secondary to immunotherapy, chronic prednisone use, type 2 diabetes, obesity, history of CVA in 2004 after giving birth, prothrombin deficiency, obstructive sleep apnea, asthma, lymphedema, lumbago, chronic pain, fibromyalgia, depression, anxiety and insomnia. She is now s/p robotic proctectomy, ileostomy takedown, and creation of ileal J-pouch on 9/5/23.     Pain service was consulted to assist with pain management.  She is opioid naive at baseline.    She has chronic peripheral neuropathy, arthritis and fibromyalgia for which she uses medical cannabis-vapors for pain relief (certified by rheumatology at the [facility]).    She reports no improvement in her pain overnight and still endorses 8.5/10 lower abdominal pain.     Plan/Recommendations:    1. Continue oxycodone 10-15mg PO Q4h prn  2. Dilaudid 0.2-0.4mg IV Q 3 hours PRN for severe breakthrough.  It has been discontinued and the patient states she has more pain  3. Continue Robaxin 500-750mg PO Q 6 hours PRN.  4. Continue acetaminophen.  5. Bowel regimen.         Pain Service will continue to follow.   Primary Service Contacted with Recommendations? No    Jt Lala IV, MD  [facility]    09/09/2023       Diet: Low Fiber Diet  Diet    Relevant Labs:  Recent Labs   Lab Test 09/06/23  0657 01/13/23  0527 01/12/23  0736 05/07/21  0544 05/06/21  1356   INR  --   --  0.95  --  0.96      < > 354   < > 308   PTT  --   --   --   --  23   BUN 8.7   < > 9.1   < > 19    < > = values in this interval not displayed.       Physical  Exam:  Temp: 98.3  F (36.8  C) Temp src: Oral BP: 104/58 Pulse: 86   Resp: 16 SpO2: 90 % O2 Device: None (Room air)      Physical Exam:     CONSTITUTIONAL/GENERAL APPEARANCE: Conversant.  EYES: EOMI, sclerae clear  ENT/NECK: neck is supple  RESPIRATORY:non labored breathing, on room air  CARDIOVASCULAR: HR within normal limits  GI:soft, nontender, has abdominal binder on, ostomy bag present with output.  MUSCULOSKELETAL/BACK/SPINE/EXTREMITIES: Moving UE and LE independently.     NEURO:  AAOx3.   SKIN/VASCULAR EXAM:  Dry and warm.  PSYCHIATRIC/BEHAVIORAL/OBSERVATIONS:  Verbalizes   Judgment/Insight -fair   Orientation - x3   Memory -fair   Mood and affect - calm, pleasant, cooperative         Relevant Medications:  Current Pain Medications:  Medications related to Pain Management (From now, onward)      Start     Dose/Rate Route Frequency Ordered Stop    09/07/23 0800  methocarbamol (ROBAXIN) tablet 750 mg         750 mg Oral 4 TIMES DAILY 09/06/23 2332      09/06/23 1125  acetaminophen (TYLENOL) tablet 975 mg         975 mg Oral EVERY 6 HOURS 09/06/23 0611      09/06/23 0932  HYDROmorphone (DILAUDID) injection 0.2-0.4 mg         0.2-0.4 mg Intravenous EVERY 2 HOURS PRN 09/06/23 0933      09/06/23 0931  HYDROmorphone (DILAUDID) tablet 2-4 mg         2-4 mg Oral EVERY 3 HOURS PRN 09/06/23 0933      09/06/23 0636  hydrOXYzine (ATARAX) tablet 25 mg        See Hyperspace for full Linked Orders Report.    25 mg Oral EVERY 6 HOURS PRN 09/06/23 0636      09/06/23 0636  hydrOXYzine (ATARAX) tablet 50 mg        See Hyperspace for full Linked Orders Report.    50 mg Oral EVERY 6 HOURS PRN 09/06/23 0636      09/05/23 1902  lidocaine 1 % 0.1-1 mL         0.1-1 mL Other EVERY 1 HOUR PRN 09/05/23 1902      09/05/23 1902  lidocaine (LMX4) cream          Topical EVERY 1 HOUR PRN 09/05/23 1902      09/05/23 0650  BUPivacaine liposome (EXPAREL) LA inj was given in the infiltration site to produce post-op analgesia. Duration of action  "is up to 72 hours. Other \"aguilar\" meds should not be given for 96 hours except for lidocaine 4% patch. This is for INFORMATION ONLY.          Does not apply CONTINUOUS PRN 09/05/23 0650 09/09/23 0649            Primary Service Contacted with Recommendations? Yes    Acute Inpatient Pain Service [facility]  Hours of pain coverage 24/7   Page via [employer]- Please Page the Pain Team Via [employer]: \"PAIN MANAGEMENT ACUTE INPATIENT/ [facility]\"            "

## 2023-09-10 NOTE — PROGRESS NOTES
A&Ox4, able to make needs known. Hx this hospital stay of being accusatory with medication administration. All meds given cosigned this shift for patient safety/reassurance. ABD soft and tender. SHAY site removed today by resident MD, Takedown site repacked by resident this shift. All other sites YADIRA with some bruising. Mag PO replaced today. Passing gas and stool via ileostomy. Tolerating LFD. Passed on to oncoming RN that pt wants to be woken up on nights for medications.

## 2023-09-10 NOTE — PROGRESS NOTES
COLON & RECTAL SURGERY PROGRESS NOTE  POD# 4    S:  feels well this AM, complains of persistent pain but appears to be better controlled. She states that she requests rn to wake her up from sleep in order to administer pain medications, though this is typically unnecessary. Tolerating diet, DLI functioning. Discussed goals of discharge, and that pain may be better controlled once she is using home medical marijuana.    Vitals:  Vitals:    09/09/23 0718 09/09/23 1530 09/09/23 2203 09/10/23 0713   BP: 99/53 104/58 102/60 108/66   BP Location: Right arm Right arm Right arm Right arm   Cuff Size:       Pulse: 82 86 84 87   Resp: 16 16     Temp: 97.9  F (36.6  C) 98.3  F (36.8  C) 97.9  F (36.6  C) 98.5  F (36.9  C)   TempSrc: Oral Oral Oral Oral   SpO2: 90% 90% 94% 93%   Weight:       Height:         I/O:  I/O last 3 completed shifts:  In: -   Out: 2260 [Urine:1250; Drains:405; Stool:605]    PE:  Gen:      AAOx3, NAD  Pulm:    Non-labored breathing  Abd:      Soft, non-distended, minimally tender throughout    Incision clean and dry, ostomy pink and patent, SHAY site significantly tender, drain w/ with moderate serous drainage  Ext:       Warm and well-perfused    Labs:  Recent Labs   Lab 09/09/23  1002 09/06/23  0657 09/05/23  2348 09/05/23  1727 09/05/23  0830   WBC  --  15.5* 19.1*  --   --    HGB  --  11.1* 11.1*  --   --    PLT  --  315 345  --   --    NA  --  140 141  --   --    POTASSIUM 3.2* 4.1 4.5  --   --    CHLORIDE  --  106 107  --   --    CO2  --  22 22  --   --    BUN  --  8.7 7.7  --   --    CR  --  0.87 0.78  --   --    GLC  --  122* 138* 153* 110*   MAG 1.7 1.8  --   --   --    PHOS  --  4.9*  --   --   --      A/P: 48 year old female with complex medical history to include ulcerative colitis s/p TAC with end ileostomy (laparoscopic) on 6/15/21 with subsequent parastomal hernia now s/p robotic proctectomy, ileostomy takedown, creation of ileal J-pouch and DLI on 09/06. She continues to progress towards  discharge. Now transitioned to PO pain meds, though received one dose of IV dilaudid yesterday evening. Voiding spontaneously, no ongoing vaginal drainage and low clinical concern for enterovaginal fistula.  - Continue PO pain control regimen, appreciate pain team recs  - Encouraging regular diet  - Monitor ostomy output  - SHAY will be removed prior to discharge  - Continue DVT PPX, will not need lovenox injections upon discharge however.      Patient was seen and discussed with the colorectal fellow, Dr. Rodriguez.     Lowell Bowman MD  General Surgery PGY-1  Colon and Rectal Surgery  Pager: [phone]

## 2023-09-11 ENCOUNTER — TELEPHONE (OUTPATIENT)
Dept: FAMILY MEDICINE | Facility: CLINIC | Age: 48
End: 2023-09-11

## 2023-09-11 ENCOUNTER — APPOINTMENT (OUTPATIENT)
Dept: OCCUPATIONAL THERAPY | Facility: CLINIC | Age: 48
DRG: 330 | End: 2023-09-11
Attending: SURGERY
Payer: COMMERCIAL

## 2023-09-11 VITALS
HEIGHT: 63 IN | DIASTOLIC BLOOD PRESSURE: 69 MMHG | RESPIRATION RATE: 16 BRPM | WEIGHT: 209.88 LBS | HEART RATE: 86 BPM | OXYGEN SATURATION: 96 % | SYSTOLIC BLOOD PRESSURE: 103 MMHG | TEMPERATURE: 98.5 F | BODY MASS INDEX: 37.19 KG/M2

## 2023-09-11 LAB
HOLD SPECIMEN: NORMAL
MAGNESIUM SERPL-MCNC: 1.9 MG/DL (ref 1.7–2.3)
POTASSIUM SERPL-SCNC: 3.7 MMOL/L (ref 3.4–5.3)

## 2023-09-11 PROCEDURE — 250N000013 HC RX MED GY IP 250 OP 250 PS 637: Performed by: STUDENT IN AN ORGANIZED HEALTH CARE EDUCATION/TRAINING PROGRAM

## 2023-09-11 PROCEDURE — 97535 SELF CARE MNGMENT TRAINING: CPT | Mod: GO | Performed by: OCCUPATIONAL THERAPIST

## 2023-09-11 PROCEDURE — 250N000013 HC RX MED GY IP 250 OP 250 PS 637

## 2023-09-11 PROCEDURE — 250N000013 HC RX MED GY IP 250 OP 250 PS 637: Performed by: SURGERY

## 2023-09-11 PROCEDURE — 250N000011 HC RX IP 250 OP 636: Performed by: SURGERY

## 2023-09-11 PROCEDURE — 999N000198 HC STATISTIC WOC PT EDUCATION, 16-30 MIN

## 2023-09-11 PROCEDURE — 36415 COLL VENOUS BLD VENIPUNCTURE: CPT | Performed by: SURGERY

## 2023-09-11 PROCEDURE — 97530 THERAPEUTIC ACTIVITIES: CPT | Mod: GO | Performed by: OCCUPATIONAL THERAPIST

## 2023-09-11 PROCEDURE — 84132 ASSAY OF SERUM POTASSIUM: CPT | Performed by: SURGERY

## 2023-09-11 PROCEDURE — 83735 ASSAY OF MAGNESIUM: CPT | Performed by: SURGERY

## 2023-09-11 PROCEDURE — 99231 SBSQ HOSP IP/OBS SF/LOW 25: CPT

## 2023-09-11 PROCEDURE — 250N000011 HC RX IP 250 OP 636: Mod: JZ | Performed by: PHYSICIAN ASSISTANT

## 2023-09-11 RX ORDER — METHOCARBAMOL 750 MG/1
750 TABLET, FILM COATED ORAL 4 TIMES DAILY
Status: DISCONTINUED | OUTPATIENT
Start: 2023-09-11 | End: 2023-09-11 | Stop reason: HOSPADM

## 2023-09-11 RX ORDER — POTASSIUM CHLORIDE 750 MG/1
20 TABLET, EXTENDED RELEASE ORAL ONCE
Status: COMPLETED | OUTPATIENT
Start: 2023-09-11 | End: 2023-09-11

## 2023-09-11 RX ORDER — OXYCODONE HYDROCHLORIDE 5 MG/1
15 TABLET ORAL EVERY 4 HOURS
Qty: 36 TABLET | Refills: 0 | Status: SHIPPED | OUTPATIENT
Start: 2023-09-11 | End: 2023-09-22

## 2023-09-11 RX ORDER — OXYCODONE HYDROCHLORIDE 5 MG/1
10 TABLET ORAL EVERY 4 HOURS
Qty: 60 TABLET | Refills: 0 | Status: SHIPPED | OUTPATIENT
Start: 2023-09-11 | End: 2023-09-22

## 2023-09-11 RX ORDER — MAGNESIUM SULFATE HEPTAHYDRATE 40 MG/ML
2 INJECTION, SOLUTION INTRAVENOUS ONCE
Status: COMPLETED | OUTPATIENT
Start: 2023-09-11 | End: 2023-09-11

## 2023-09-11 RX ORDER — FAMOTIDINE 20 MG/1
20 TABLET, FILM COATED ORAL 2 TIMES DAILY
Qty: 60 TABLET | Refills: 0 | Status: SHIPPED | OUTPATIENT
Start: 2023-09-11 | End: 2023-12-15

## 2023-09-11 RX ORDER — OXYCODONE HYDROCHLORIDE 10 MG/1
10 TABLET ORAL EVERY 4 HOURS
Status: DISCONTINUED | OUTPATIENT
Start: 2023-09-11 | End: 2023-09-11 | Stop reason: HOSPADM

## 2023-09-11 RX ADMIN — POTASSIUM CHLORIDE 20 MEQ: 750 TABLET, EXTENDED RELEASE ORAL at 10:04

## 2023-09-11 RX ADMIN — MAGNESIUM SULFATE IN WATER 2 G: 40 INJECTION, SOLUTION INTRAVENOUS at 10:05

## 2023-09-11 RX ADMIN — METHOCARBAMOL 750 MG: 750 TABLET ORAL at 00:57

## 2023-09-11 RX ADMIN — ENOXAPARIN SODIUM 40 MG: 40 INJECTION SUBCUTANEOUS at 12:53

## 2023-09-11 RX ADMIN — ACETAMINOPHEN 975 MG: 325 TABLET, FILM COATED ORAL at 03:47

## 2023-09-11 RX ADMIN — OXYCODONE HYDROCHLORIDE 15 MG: 5 TABLET ORAL at 08:41

## 2023-09-11 RX ADMIN — ACETAMINOPHEN 975 MG: 325 TABLET, FILM COATED ORAL at 10:04

## 2023-09-11 RX ADMIN — METHOCARBAMOL 750 MG: 750 TABLET ORAL at 06:59

## 2023-09-11 RX ADMIN — OXYCODONE HYDROCHLORIDE 15 MG: 5 TABLET ORAL at 12:52

## 2023-09-11 RX ADMIN — CLOBETASOL PROPIONATE 1 G: 0.5 CREAM TOPICAL at 08:44

## 2023-09-11 RX ADMIN — VENLAFAXINE 150 MG: 75 TABLET ORAL at 08:44

## 2023-09-11 RX ADMIN — OXYCODONE HYDROCHLORIDE 15 MG: 5 TABLET ORAL at 03:46

## 2023-09-11 RX ADMIN — SIMETHICONE 80 MG: 80 TABLET, CHEWABLE ORAL at 08:52

## 2023-09-11 RX ADMIN — HYDROXYZINE HYDROCHLORIDE 50 MG: 50 TABLET ORAL at 00:58

## 2023-09-11 RX ADMIN — METHOCARBAMOL 750 MG: 750 TABLET ORAL at 12:16

## 2023-09-11 RX ADMIN — HYDROXYZINE HYDROCHLORIDE 50 MG: 50 TABLET ORAL at 06:59

## 2023-09-11 RX ADMIN — FAMOTIDINE 20 MG: 20 TABLET ORAL at 08:44

## 2023-09-11 ASSESSMENT — ACTIVITIES OF DAILY LIVING (ADL)
ADLS_ACUITY_SCORE: 22
ADLS_ACUITY_SCORE: 28
ADLS_ACUITY_SCORE: 22

## 2023-09-11 NOTE — PROGRESS NOTES
Occupational Therapy Discharge Summary    Reason for therapy discharge:    Discharged to home.    Progress towards therapy goal(s). See [facility] for goal details.  Goals met    Therapy recommendation(s):    No further therapy is recommended.  Continue home exercise program.

## 2023-09-11 NOTE — DISCHARGE INSTRUCTIONS
[facility]      Name: Doretha Yu  Date: 9/11/23     To order your ostomy supplies     The ostomy Supplier needs this supply list  to process your order. You will need to fax/deliver this list, along with your Insurance information. Your home care nurse can assist with this process.     List of Ostomy Distributors                                         [employer]  Ph. [phone] [phone] Fax # [phone]  [facility]   Ph. [phone]  [employer] [facility]   Ph. [phone]  [facility]   Ph. [phone] [phone]  Or Call your insurance provider for their preferred supplier     Your Medical Supplier will need your surgeon's name, phone and fax number     Clinic:                                                                    Phone                                    Fax  Colorectal Surgery:                              [phone]     Verbal Order for ostomy supplies for 1 Month per:                                                     Rachel Montez, RN, CWOCN                                                                                                                Authorizing MD: Dr. Quinton Ram     Quantity of pouches:   20  /mo.     Request the following supplies:      Keith        -2 piece flat wafer 57mm  # [id number] (cut to fit)                           -Fecal pouch 57mm- # 28465  Accessories    -1 3/16 convex rings #[id number]- will need smaller ones as stoma gets smaller- call [employer] or [facility]   nurse for numbers to order                           -2  Sarah ring #[id number]                           -Adapt powder #7906                           -No sting film barrier # 4274                          -Adapt odor eliminator and lubricant 236ml bottle # 74330                            -M-9 Spray room deodorizer #4869                             -Brava elastic barrier strips curved #  [id number]                                                                                                                 Change your pouch 2-3 times a week, more often if leaking.   If you are cutting a hole in the wafer of your pouch, recheck stoma size and adjust pouch opening as needed every week     . Call the Ostomy Nurse at [facility]      [address] [place] [address], [place] : [phone]   Schedule a follow-up visit in 2 to 4 weeks after your surgery, sooner if having problems Bring a complete set of pouch-changing supplies to this visit     [employer] cares program- if you like the [employer] convex rings you can all this number for more samples or to have them look up the size you need!  [phone]      Problems you should Report  - The stoma turns blue or darker in color.  - Cuts or sores around the stoma.  - Red, raw or painful skin around the stoma.  - Any bulging of the skin around the stoma.  - A pouch that leaks every day.  - Problems making the right size hole in the pouch wafer.   Please call with any questions or concerns.

## 2023-09-11 NOTE — PROGRESS NOTES
[facility] Nurse Inpatient Assessment     Consulted for: new loop ileostomy RLQ      Patient History (according to Colorectal provider note(s) 9/6/23:      This is a 48 year old female with complex medical history to include ulcerative colitis s/p TAC with end ileostomy (laparoscopic) on 6/15/21 with subsequent parastomal hernia, inflammatory arthritis secondary to immunotherapy, chronic prednisone use, type 2 diabetes, obesity, history of CVA in 2004 after giving birth, prothrombin deficiency, obstructive sleep apnea, asthma, lymphedema, lumbago, chronic pain, depression, anxiety and insomnia. She is now s/p robotic proctectomy, ileostomy takedown, and creation of ileal J-pouch on 9/5/23 with Dr Ram.     Per OP note:   Doretha Yu is a 48-year-old lady who underwen total abdominal colectomy with end ileostomy by me in June 2021 for ulcerative pancolitis.  Thankfully, she recovered very well from this.  She has lost over 60 lbs in the past year and now is a much more appropriate candidate for this elective procedure.  Her ileostomy was functioning well with and she denied any pouching issues.  She denied any previous anorectal operations.  We had a long discussion with regards to a restorative proctocolectomy versus end ileostomy.  She elected to proceed with an ileal J-pouch.     Assessment:      Areas visualized during today's visit: Abdomen    Assessment of new loop Ileostomy:  Diagnosis Pertinent to Stoma: Ulcerative Colitis      Surgery Date: 9/5/23  Surgeon: Dr Ram      Hospital: [facility]  Pouching system in place on assessment today: Keith 57mm two piece, cut to fit, and flat   Pouch barrier status: intact and Minimal melting  Pouch last changed/wear time: 3 days, changed 9/8/23  Reason for pouch change today: pouch not changed today  Effectiveness of current pouching/ supply plan: Effective  Change made with ostomy management today:  "No  Pouching system placed today: Keith 57mm two piece, cut to fit, flat, barrier ring, and skin prep- stretched barrier ring out then pinched together at 3 and 9 O'clock to fill crease  Supplies: gathered, supplies stored on unit, discussed with RN, and discussed with patient (pt has precut barriers that are 1\"- will not fit right now but explained to pt that they may work in 4-6 weeks once swelling goes down)    Last photo: 9/6/23  Stoma location: RLQ (previous site on LLQ would not work so had to be moved)  Stoma size: 1 1/8 inches, slightly oval  Stoma appearance: red, oval, moist, edematous, and protruberant  Mucocutaneous junction:  intact, 9/7did not evaluate, pouch on  Peristomal complication(s): none   Output: liquid and brown,green  Output volume emptied during visit: 50 ml  Abdominal assessment: Distended  Surgical site(s): dressing dry and intact, changed per Colorectal team  NG still in place? No  Pain: Cramping and Sharp  Is patient still on a PCA? No    Ostomy education assessment:  Participant of teaching session today: patient   Education completed today: Pouching system assessment , Importance of hydration, When to seek medical attention, and Discharge instructions  Educational materials/methods: Verbal, Demonstration, and Addressed patient/caregiver questions/concerns  Education still needed: Pouch change return demonstration, pt has had an ostomy previously- see HPI.   Learning Comprehension:   Psychosocial assessment: JUDAH- pt in a lot of pain today but has had an ileostomy in the past which was flat/retracted and she had no difficulty pouching it.   9/7 Reported she is comfortable with pouch changes, only had questions regarding supplies to go home with. Supplies provided and at bedside. Questions answered  Patient readiness for education today: attentive and in significant pain  Following today's visit: patient  is able to demonstrate;         1. How to empty their pouch? Yes        2. How " "to change their pouch? Yes        3. How to read and record intake and output correctly? Yes and with minimal assist- today due to pain  Preparation for discharge completed: No discharge preparation started yet, Ensured patient has extra supplies for discharge, Discussed how to order supplies after discharge , Discussed how and when to make an outpatient WOC nurse appointment after discharge, Prepared for discharge home with home care, and Discuss signs/symptoms of when to seek medical attention  Preparation for discharge still needed: Placed prescription recommendations in discharge navigator for MD to sign  Pt support system on discharge: parents  WO recommend home care? No  Face to face time: 15 minutes         Treatment Plan:     RLQ Ileostomy pouching plan:   Pouching system: ostomy supplies pouches: Keith 57 FECAL (071359) ostomy supplies barrier: [place] 57mm FLAT (405031)  Accessories used: [facility] ostomy accessories: 2\" Cera Barrier Ring (971462), Large Belt (335198), M9 Drops (165169), and Cavilon no sting barrier film (143579)   Frequency of pouch changes: PRN leakage and Three times a week  WOC follow up plan: Daily Monday-Friday (as able)  Bedside RN interventions: Change pouch PRN if leaking using the supplies above, Empty pouch when 1/3 to 1/2 full, ensure to clean pouch outlet after emptying to prevent odor, Notify WOC for ongoing pouch leakage, and Document stoma appearance and output volume, color, and consistency every shift     Orders: Reviewed    RECOMMEND PRIMARY TEAM ORDER: None, at this time  Education provided: plan of care  Discussed plan of care with: Patient and Nurse  WOC nurse follow-up plan: Monday/WednesdayFriday  Notify WOC if wound(s) deteriorate.  Nursing to notify the Provider(s) and re-consult the WOC Nurse if new skin concern.    DATA:     Current support surface: Standard  Standard gel/foam mattress (IsoFlex, Atmos air, etc)  Containment of urine/stool: Incontinent pad in " bed and Ileostomy pouch  BMI: Body mass index is 37.18 kg/m .   Active diet order: Orders Placed This Encounter      Low Fiber Diet      Diet     Output: I/O last 3 completed shifts:  In: 1140 [P.O.:1140]  Out: 1735 [Urine:1150; Drains:60; Stool:525]     Labs:   Recent Labs   Lab 09/06/23  0657   HGB 11.1*   WBC 15.5*       Pressure injury risk assessment:   Sensory Perception: 3-->slightly limited  Moisture: 4-->rarely moist  Activity: 3-->walks occasionally  Mobility: 3-->slightly limited  Nutrition: 3-->adequate  Friction and Shear: 3-->no apparent problem  Lv Score: 19      Pager no longer is use, please contact through [employer] group: [facility] Nurse [place]  Dept. Office Number: *[phone]       [facility]     Name: Doretha Yu  Date: 9/11/23    To order your ostomy supplies    The ostomy Supplier needs this supply list  to process your order. You will need to fax/deliver this list, along with your Insurance information. Your home care nurse can assist with this process.    List of Ostomy Distributors      [facility]  Ph. [phone] [phone] Fax # [phone]  [facility]   Ph. [phone]  [employer] [facility]   Ph. [phone]  [facility]   Ph. [phone] [phone]  Or Call your insurance provider for their preferred supplier    Your Medical Supplier will need your surgeon's name, phone and fax number    Clinic:                     Phone                            Fax  Colorectal Surgery:    [phone]    Verbal Order for ostomy supplies for 1 Month per:                                          Rachel Montez, RN, CWOCN                                                                                                    Authorizing MD: Dr. Quinton Ram    Quantity of pouches:   20  /mo.    Request the following supplies:      Keith        -2 piece flat wafer 57mm  # 29414 (cut to fit)    -Fecal pouch  57mm- # 66583  Accessories  -1 3/16 convex rings #40690- will need smaller ones as stoma gets smaller- call [employer] or [facility]   nurse for numbers to order    -2  Sarah ring #[id number]    -Adapt powder #7906    -No sting film barrier # 3345                          -Adapt odor eliminator and lubricant 236ml bottle # 89507     -M-9 Spray room deodorizer #0494                           -Brava elastic barrier strips curved # 133098                                                                  Change your pouch 2-3 times a week, more often if leaking.   If you are cutting a hole in the wafer of your pouch, recheck stoma size and adjust pouch opening as needed every week    . Call the Ostomy Nurse at [facility]      [address] [place] [address], [place] : [phone]   Schedule a follow-up visit in 2 to 4 weeks after your surgery, sooner if having problems Bring a complete set of pouch-changing supplies to this visit    [employer] cares program- if you like the [employer] convex rings you can all this number for more samples or to have them look up the size you need!  [phone]     Problems you should Report  - The stoma turns blue or darker in color.  - Cuts or sores around the stoma.  - Red, raw or painful skin around the stoma.  - Any bulging of the skin around the stoma.  - A pouch that leaks every day.  - Problems making the right size hole in the pouch wafer.   Please call with any questions or concerns.

## 2023-09-11 NOTE — PLAN OF CARE
"/69 (BP Location: Right arm, Cuff Size: Adult Large)   Pulse 86   Temp 98.5  F (36.9  C) (Oral)   Resp 16   Ht 1.6 m (5' 3\")   Wt 95.2 kg (209 lb 14.1 oz)   SpO2 96%   BMI 37.18 kg/m       A&Ox4, able to make needs known.  All meds given cosigned this shift for patient safety/reassurance. ABD soft and tender.  Takedown site drsg D/I., SHAY site drsg D/I,sites YADIRA with some bruising. Mag and potassium replaced today.  Pt UAL to bathroom with SBA, Passing gas and stool via ileostomy. Tolerating LFD.     Discharge Note:    Patient discharged to home at 1:50 PM via wheel chair. Accompanied by staff. Discharge instructions reviewed with patient, opportunity offered to ask questions. Discharge teaching included pain management, activity restrictions, dressing changes, and signs and symptoms of infection. Dressing supplies sent home. Prescriptions sent to patients preferred pharmacy.Pt. was given times of last dose for all discharge medications in writing on discharge medication sheets.  All belongings sent with patient. Pt. had no further questions at the time of discharge and no unmet needs were identified.         "

## 2023-09-11 NOTE — TELEPHONE ENCOUNTER
Documentation from today's discharge instructions     WOUND CARE  -Inspect your wounds daily for signs of infection (increased redness, drainage, pain)  -Keep your wound clean and dry  -You may shower, but do not soak in tub or pool  -Pack old ostomy sites 1-2 times per day with either Nugauze or minimally moistened gauze and cover with additional gauze and secure in place with medical adhesive. Change this gauze 1-2 times per day and more as needed for any drainage/saturation/showering.   Floridalma Wilde RN

## 2023-09-11 NOTE — PROGRESS NOTES
Care Management Discharge Note    Discharge Date: 09/11/2023  Discharge Disposition: Home  Discharge Services: OP Clinic  Discharge DME: Other (see comment) (Ostomy supplies)  Discharge Transportation: family or friend will provide  Private pay costs discussed: Not applicable  Does the patient's insurance plan have a 3 day qualifying hospital stay waiver?  Yes   Will the waiver be used for post-acute placement? No  PAS Confirmation Code:  NA  Patient/family educated on Medicare website which has current facility and service quality ratings: yes  Education Provided on the Discharge Plan: Yes  Persons Notified of Discharge Plans: Patient  Patient/Family in Agreement with the Plan: yes    Handoff Referral Completed: Yes    Additional Information:  During morning huddle, RNCC was notified by primary team that pt will be medically ready for discharge today and is needing close follow up due to concerns for readmission. RNCC saw that there was no home care agency that could accept pt. RNCC met with pt at bedside to discuss a good discharge plan. Pt states she lives only a mile away from her PCP clinic and would prefer to follow up there if possible. Pt states [place] and [place] are also close to where she lives as well. Pt is ok with not having home care as long as she can follow up in clinic regarding her wound as she is really concerned regarding infection because of previous experiences. RNCC called PCP clinic and spoke with clinic RN. Clinic RN stated they would be willing to follow up and provide wound care as long as they have an order detailing what cares are necessary. Clinic RN stated she would call pt and get those appointments set up. RNCC called [place] [facility] to ensure they can schedule pt and provide WOC follow up in the event pt needs it. WOC RN stated they can do that as long as an OP WOC referral gets placed. RNCC called and spoke with [place], colorectal resident, who stated he would put in the  wound care orders and OP WOC referral. RNCC met with pt at bedside and went over discharge plan. Pt is comfortable with this plan and stated PCP clinic had already called her and she has an appointment set up for 9/13/23. Father will be picking her up. No other discharge needs identified.    Angel Richey RN BSN  5A RN Care Coordinator   Ph: [phone]  Pager: [phone]   no

## 2023-09-11 NOTE — PROGRESS NOTES
Pain Service Progress Note  [facility]  Date: 09/11/2023       Patient Name: Doretha Yu  MRN: [id number]  Age: 48 year old  Sex: female      Assessment:  Doretha Yu is a 48 year old female who has PMH ulcerative colitis s/p TAC with end ileostomy (laparoscopic) on 6/15/21 with subsequent parastomal hernia, inflammatory arthritis secondary to immunotherapy, chronic prednisone use, type 2 diabetes, obesity, history of CVA in 2004 after giving birth, prothrombin deficiency, obstructive sleep apnea, asthma, lymphedema, lumbago, chronic pain, fibromyalgia, depression, anxiety and insomnia. She is now s/p robotic proctectomy, ileostomy takedown, and creation of ileal J-pouch on 9/5/23.     Pain service was consulted to assist with pain management.  She is opioid naive at baseline.    She has chronic peripheral neuropathy, arthritis and fibromyalgia for which she uses medical cannabis-vapors for pain relief (certified by rheumatology at the [facility]).    She reports no improvement in her pain overnight and still endorses 8.5/10 lower abdominal pain.     Plan/Recommendations:    Continue to schedule oxycodone 10-15mg PO Q4h prn, and she could refuse if not in pain.  2.   Schedule Robaxin 500-750mg PO Q 6 hours and she could refuse if not in pain.  3.   Continue acetaminophen.  .         Pain Service will continue to follow.   Primary Service Contacted with Recommendations? No    Jt Lala IV, MD  [facility]      09/11/2023       Diet: Low Fiber Diet  Diet    Relevant Labs:  Recent Labs   Lab Test 09/06/23  0657 01/13/23  0527 01/12/23  0736 05/07/21  0544 05/06/21  1356   INR  --   --  0.95  --  0.96      < > 354   < > 308   PTT  --   --   --   --  23   BUN 8.7   < > 9.1   < > 19    < > = values in this interval not displayed.       Physical Exam:  Vital signs:  Temp: 98.1  F (36.7  C) Temp src: Oral BP:  "100/56 Pulse: 81   Resp: 16 SpO2: 92 % O2 Device: None (Room air) Oxygen Delivery: 2 LPM Height: 160 cm (5' 3\") Weight: 95.2 kg (209 lb 14.1 oz)  Estimated body mass index is 37.18 kg/m  as calculated from the following:    Height as of this encounter: 1.6 m (5' 3\").    Weight as of this encounter: 95.2 kg (209 lb 14.1 oz).          Physical Exam:     CONSTITUTIONAL/GENERAL APPEARANCE: Conversant.  EYES: EOMI, sclerae clear  ENT/NECK: neck is supple  RESPIRATORY:non labored breathing, on room air  CARDIOVASCULAR: HR within normal limits  GI:soft, nontender, has abdominal binder on, ostomy bag present with output.  MUSCULOSKELETAL/BACK/SPINE/EXTREMITIES: Moving UE and LE independently.     NEURO:  AAOx3.   SKIN/VASCULAR EXAM:  Dry and warm.  PSYCHIATRIC/BEHAVIORAL/OBSERVATIONS:  Verbalizes   Judgment/Insight -fair   Orientation - x3   Memory -fair   Mood and affect - calm, pleasant, cooperative         Relevant Medications:  Current Pain Medications:  Medications related to Pain Management (From now, onward)      Start     Dose/Rate Route Frequency Ordered Stop    09/07/23 0800  methocarbamol (ROBAXIN) tablet 750 mg         750 mg Oral 4 TIMES DAILY 09/06/23 2332      09/06/23 1125  acetaminophen (TYLENOL) tablet 975 mg         975 mg Oral EVERY 6 HOURS 09/06/23 0611      09/06/23 0932  HYDROmorphone (DILAUDID) injection 0.2-0.4 mg         0.2-0.4 mg Intravenous EVERY 2 HOURS PRN 09/06/23 0933      09/06/23 0931  HYDROmorphone (DILAUDID) tablet 2-4 mg         2-4 mg Oral EVERY 3 HOURS PRN 09/06/23 0933      09/06/23 0636  hydrOXYzine (ATARAX) tablet 25 mg        See Hyperspace for full Linked Orders Report.    25 mg Oral EVERY 6 HOURS PRN 09/06/23 0636      09/06/23 0636  hydrOXYzine (ATARAX) tablet 50 mg        See Hyperspace for full Linked Orders Report.    50 mg Oral EVERY 6 HOURS PRN 09/06/23 0636      09/05/23 1902  lidocaine 1 % 0.1-1 mL         0.1-1 mL Other EVERY 1 HOUR PRN 09/05/23 1902      09/05/23 1902  " "lidocaine (LMX4) cream          Topical EVERY 1 HOUR PRN 09/05/23 1902      09/05/23 0650  BUPivacaine liposome (EXPAREL) LA inj was given in the infiltration site to produce post-op analgesia. Duration of action is up to 72 hours. Other \"aguilar\" meds should not be given for 96 hours except for lidocaine 4% patch. This is for INFORMATION ONLY.          Does not apply CONTINUOUS PRN 09/05/23 0650 09/09/23 0649            Primary Service Contacted with Recommendations? Yes    Acute Inpatient Pain Service [facility]  Hours of pain coverage 24/7   Page via [employer]- Please Page the Pain Team Via [facility]: \"PAIN MANAGEMENT ACUTE INPATIENT/ [facility] [place]\"            "

## 2023-09-11 NOTE — TELEPHONE ENCOUNTER
Call received from Jarvis with Clinic Coordination at the . Pt will hopefully be discharged today from the , she has been there a week post surgical for a new ileostomy site. Hosp follow up scheduled for 9/13/2023 with PCP, pt will need old ileostomy site dressing changed a few times a week.  Pt prefers to come to [facility] clinic for dressing changes with the RN. Per Jarvis, there are no special dressings required, it is not a new surgical site. Message forwarded to PCP to verify that dressing changes can be done at [facility], also to ask if orders are needed by another provider for dressing change of if Dr Naidu will order it. Message also forwarded to [facility] RN who saw pt today for recommendations of preferred dressing. Floridalma Wilde RN

## 2023-09-11 NOTE — TELEPHONE ENCOUNTER
Typically wound care is done at [place].     If they have specific dressing recommendations, I suppose the nurse could do that in [facility].  Wound care recommendations/orders should be clearly laid out by surgeon/wound care from the hospital.    Anna Naidu M.D.

## 2023-09-12 ENCOUNTER — TELEPHONE (OUTPATIENT)
Dept: SURGERY | Facility: CLINIC | Age: 48
End: 2023-09-12
Payer: COMMERCIAL

## 2023-09-12 ENCOUNTER — PATIENT OUTREACH (OUTPATIENT)
Dept: CARE COORDINATION | Facility: CLINIC | Age: 48
End: 2023-09-12
Payer: COMMERCIAL

## 2023-09-12 DIAGNOSIS — Z93.2 S/P ILEOSTOMY (H): Primary | ICD-10-CM

## 2023-09-12 NOTE — TELEPHONE ENCOUNTER
On 9/12/2023 at 2:12 PM:  Received call from patient asking if writer's number [phone] is the [facility]. I said that the number is darcy, Cathy from scheduling at the [facility]. Patient said that is all that she needed to know.    Cathy Terrazas  Misti-op Coordinator  [facility]  Direct Phone: [phone]

## 2023-09-12 NOTE — TELEPHONE ENCOUNTER
I'm confused by the request for dressing changes.  The orders on the discharge instructions are to change the dressing 1-2 times a day.      The original call indicated she needed dressing change in clinic 2-3 times a week - are there two separate wound sites?  If so, what dressing does she need changed in the clinic. If this is going to happen here, we need to make sure we have the proper supplies and that staff is comfortable with doing the dressing change.    Anna Naidu M.D.

## 2023-09-12 NOTE — LETTER
[facility] CARE COORDINATION  [address]  [place] [address]    September 12, 2023    Doretha Yu  [address] [place] [address]      Dear Doretha,        I am a  clinic care coordinator who works with Anna Naidu MD with the [facility]. I wanted to introduce myself and provide you with my contact information for you to be able to call me with any questions or concerns. Below is a description of clinic care coordination and how I can further assist you.       The clinic care coordination team is made up of a registered nurse, , financial resource worker and community health worker who understand the health care system. The goal of clinic care coordination is to help you manage your health and improve access to the health care system. Our team works alongside your provider to assist you in determining your health and social needs. We can help you obtain health care and community resources, providing you with necessary information and education. We can work with you through any barriers and develop a care plan that helps coordinate and strengthen the communication between you and your care team.  Our services are voluntary and are offered without charge to you personally.    Please feel free to contact me with any questions or concerns regarding care coordination and what we can offer.      We are focused on providing you with the highest-quality healthcare experience possible.    Sincerely,     Rhys Dominguez MSN, RN, PHN, CCM / covering for Gisel Marte [phone]  Primary Care Clinical RN Care Coordinator  [facility]  9/12/2023   9:40 AM  [email or web address]  Office: [phone]

## 2023-09-12 NOTE — TELEPHONE ENCOUNTER
Pt was called to follow up on wound care needs. Pt was crying saying home care cannot find the staff to take care of her. Chart was reviewed and clinical care coordination will contact patient. Pt has an appointment tomorrow 9/13.     Chantel Burr RN

## 2023-09-12 NOTE — PROGRESS NOTES
Clinic Care Coordination Contact  [facility]/Voicemail       Clinical Data: Care Coordinator Outreach  Outreach attempted x 1.  Left message on patient's voicemail with call back information and requested return call.  Plan: Care Coordinator will send care coordination introduction letter with care coordinator contact information and explanation of care coordination services via [employer]. Care Coordinator will try to reach patient again in 1-2 business days.    Rhys Dominguez MSN, RN, PHN, CCM / covering for Gisel Marte [phone]  Primary Care Clinical RN Care Coordinator  [facility]  9/12/2023   9:41 AM  [email or web address]  Office: [phone]

## 2023-09-12 NOTE — TELEPHONE ENCOUNTER
Routing to Dr. Naidu as FYI for tomorrow's appointment with provider.    Rosemary Borden, RN on 9/12/2023 at 4:28 PM

## 2023-09-13 ENCOUNTER — NURSE TRIAGE (OUTPATIENT)
Dept: NURSING | Facility: CLINIC | Age: 48
End: 2023-09-13

## 2023-09-13 ENCOUNTER — APPOINTMENT (OUTPATIENT)
Dept: CT IMAGING | Facility: CLINIC | Age: 48
DRG: 378 | End: 2023-09-13
Attending: EMERGENCY MEDICINE
Payer: COMMERCIAL

## 2023-09-13 ENCOUNTER — HOSPITAL ENCOUNTER (INPATIENT)
Facility: CLINIC | Age: 48
LOS: 1 days | Discharge: LEFT BEFORE DISCHARGE | DRG: 378 | End: 2023-09-14
Attending: EMERGENCY MEDICINE | Admitting: SURGERY
Payer: COMMERCIAL

## 2023-09-13 VITALS
HEIGHT: 63 IN | TEMPERATURE: 97 F | BODY MASS INDEX: 36.68 KG/M2 | RESPIRATION RATE: 17 BRPM | OXYGEN SATURATION: 96 % | SYSTOLIC BLOOD PRESSURE: 124 MMHG | HEART RATE: 77 BPM | DIASTOLIC BLOOD PRESSURE: 82 MMHG | WEIGHT: 207 LBS

## 2023-09-13 DIAGNOSIS — G89.18 POST-OP PAIN: ICD-10-CM

## 2023-09-13 LAB
ALBUMIN SERPL BCG-MCNC: 3.7 G/DL (ref 3.5–5.2)
ALBUMIN UR-MCNC: NEGATIVE MG/DL
ALP SERPL-CCNC: 138 U/L (ref 35–104)
ALT SERPL W P-5'-P-CCNC: 22 U/L (ref 0–50)
AMORPH CRY #/AREA URNS HPF: ABNORMAL /HPF
ANION GAP SERPL CALCULATED.3IONS-SCNC: 12 MMOL/L (ref 7–15)
APPEARANCE UR: CLEAR
APTT PPP: 26 SECONDS (ref 22–38)
AST SERPL W P-5'-P-CCNC: 26 U/L (ref 0–45)
BACTERIA #/AREA URNS HPF: ABNORMAL /HPF
BASOPHILS # BLD AUTO: 0.1 10E3/UL (ref 0–0.2)
BASOPHILS NFR BLD AUTO: 0 %
BILIRUB SERPL-MCNC: 0.2 MG/DL
BILIRUB UR QL STRIP: NEGATIVE
BUN SERPL-MCNC: 8.1 MG/DL (ref 6–20)
CA-I BLD-MCNC: 5.1 MG/DL (ref 4.4–5.2)
CALCIUM SERPL-MCNC: 10 MG/DL (ref 8.6–10)
CHLORIDE SERPL-SCNC: 100 MMOL/L (ref 98–107)
COLOR UR AUTO: ABNORMAL
CPB POCT: NO
CREAT SERPL-MCNC: 0.83 MG/DL (ref 0.51–0.95)
DEPRECATED HCO3 PLAS-SCNC: 26 MMOL/L (ref 22–29)
EGFRCR SERPLBLD CKD-EPI 2021: 87 ML/MIN/1.73M2
EOSINOPHIL # BLD AUTO: 0.5 10E3/UL (ref 0–0.7)
EOSINOPHIL NFR BLD AUTO: 3 %
ERYTHROCYTE [DISTWIDTH] IN BLOOD BY AUTOMATED COUNT: 14.6 % (ref 10–15)
GLUCOSE BLD-MCNC: 86 MG/DL (ref 70–99)
GLUCOSE SERPL-MCNC: 87 MG/DL (ref 70–99)
GLUCOSE UR STRIP-MCNC: NEGATIVE MG/DL
HCG UR QL: NEGATIVE
HCO3 BLDV-SCNC: 29 MMOL/L (ref 21–28)
HCO3 BLDV-SCNC: 30 MMOL/L (ref 21–28)
HCT VFR BLD AUTO: 33.2 % (ref 35–47)
HCT VFR BLD CALC: 32 % (ref 35–47)
HGB BLD-MCNC: 10.8 G/DL (ref 11.7–15.7)
HGB BLD-MCNC: 10.9 G/DL (ref 11.7–15.7)
HGB UR QL STRIP: ABNORMAL
HYALINE CASTS: 4 /LPF
IMM GRANULOCYTES # BLD: 0.2 10E3/UL
IMM GRANULOCYTES NFR BLD: 1 %
INR PPP: 0.99 (ref 0.85–1.15)
KETONES UR STRIP-MCNC: NEGATIVE MG/DL
LACTATE BLD-SCNC: 1 MMOL/L
LACTATE SERPL-SCNC: 1.1 MMOL/L (ref 0.7–2)
LEUKOCYTE ESTERASE UR QL STRIP: NEGATIVE
LYMPHOCYTES # BLD AUTO: 2.3 10E3/UL (ref 0.8–5.3)
LYMPHOCYTES NFR BLD AUTO: 14 %
MAGNESIUM SERPL-MCNC: 1.9 MG/DL (ref 1.7–2.3)
MCH RBC QN AUTO: 27.1 PG (ref 26.5–33)
MCHC RBC AUTO-ENTMCNC: 32.5 G/DL (ref 31.5–36.5)
MCV RBC AUTO: 83 FL (ref 78–100)
MONOCYTES # BLD AUTO: 0.8 10E3/UL (ref 0–1.3)
MONOCYTES NFR BLD AUTO: 5 %
MUCOUS THREADS #/AREA URNS LPF: PRESENT /LPF
NEUTROPHILS # BLD AUTO: 13.4 10E3/UL (ref 1.6–8.3)
NEUTROPHILS NFR BLD AUTO: 77 %
NITRATE UR QL: NEGATIVE
NRBC # BLD AUTO: 0 10E3/UL
NRBC BLD AUTO-RTO: 0 /100
PCO2 BLDV: 44 MM HG (ref 40–50)
PCO2 BLDV: 46 MM HG (ref 40–50)
PH BLDV: 7.42 [PH] (ref 7.32–7.43)
PH BLDV: 7.42 [PH] (ref 7.32–7.43)
PH UR STRIP: 7 [PH] (ref 5–7)
PLATELET # BLD AUTO: 461 10E3/UL (ref 150–450)
PO2 BLDV: 19 MM HG (ref 25–47)
PO2 BLDV: 20 MM HG (ref 25–47)
POTASSIUM BLD-SCNC: 3.9 MMOL/L (ref 3.4–5.3)
POTASSIUM SERPL-SCNC: 4 MMOL/L (ref 3.4–5.3)
PROT SERPL-MCNC: 7 G/DL (ref 6.4–8.3)
RBC # BLD AUTO: 3.98 10E6/UL (ref 3.8–5.2)
RBC URINE: 0 /HPF
SAO2 % BLDV: 28 % (ref 94–100)
SAO2 % BLDV: 32 % (ref 94–100)
SARS-COV-2 RNA RESP QL NAA+PROBE: NEGATIVE
SODIUM BLD-SCNC: 138 MMOL/L (ref 133–144)
SODIUM SERPL-SCNC: 138 MMOL/L (ref 136–145)
SP GR UR STRIP: 1.02 (ref 1–1.03)
SQUAMOUS EPITHELIAL: <1 /HPF
UROBILINOGEN UR STRIP-MCNC: NORMAL MG/DL
WBC # BLD AUTO: 17.2 10E3/UL (ref 4–11)
WBC URINE: <1 /HPF

## 2023-09-13 PROCEDURE — 87635 SARS-COV-2 COVID-19 AMP PRB: CPT

## 2023-09-13 PROCEDURE — 120N000002 HC R&B MED SURG/OB UMMC

## 2023-09-13 PROCEDURE — 83735 ASSAY OF MAGNESIUM: CPT | Performed by: EMERGENCY MEDICINE

## 2023-09-13 PROCEDURE — 82947 ASSAY GLUCOSE BLOOD QUANT: CPT

## 2023-09-13 PROCEDURE — 250N000011 HC RX IP 250 OP 636: Performed by: EMERGENCY MEDICINE

## 2023-09-13 PROCEDURE — 85730 THROMBOPLASTIN TIME PARTIAL: CPT | Performed by: EMERGENCY MEDICINE

## 2023-09-13 PROCEDURE — 999N000127 HC STATISTIC PERIPHERAL IV START W US GUIDANCE

## 2023-09-13 PROCEDURE — 250N000011 HC RX IP 250 OP 636

## 2023-09-13 PROCEDURE — 87040 BLOOD CULTURE FOR BACTERIA: CPT | Performed by: EMERGENCY MEDICINE

## 2023-09-13 PROCEDURE — 83605 ASSAY OF LACTIC ACID: CPT

## 2023-09-13 PROCEDURE — 85610 PROTHROMBIN TIME: CPT | Performed by: EMERGENCY MEDICINE

## 2023-09-13 PROCEDURE — 99285 EMERGENCY DEPT VISIT HI MDM: CPT | Mod: 25 | Performed by: EMERGENCY MEDICINE

## 2023-09-13 PROCEDURE — 83605 ASSAY OF LACTIC ACID: CPT | Performed by: EMERGENCY MEDICINE

## 2023-09-13 PROCEDURE — 96374 THER/PROPH/DIAG INJ IV PUSH: CPT | Mod: 59 | Performed by: EMERGENCY MEDICINE

## 2023-09-13 PROCEDURE — 99285 EMERGENCY DEPT VISIT HI MDM: CPT | Performed by: EMERGENCY MEDICINE

## 2023-09-13 PROCEDURE — 81025 URINE PREGNANCY TEST: CPT | Performed by: EMERGENCY MEDICINE

## 2023-09-13 PROCEDURE — 82330 ASSAY OF CALCIUM: CPT

## 2023-09-13 PROCEDURE — 85025 COMPLETE CBC W/AUTO DIFF WBC: CPT | Performed by: EMERGENCY MEDICINE

## 2023-09-13 PROCEDURE — 74177 CT ABD & PELVIS W/CONTRAST: CPT | Mod: 26 | Performed by: RADIOLOGY

## 2023-09-13 PROCEDURE — 250N000013 HC RX MED GY IP 250 OP 250 PS 637

## 2023-09-13 PROCEDURE — 36415 COLL VENOUS BLD VENIPUNCTURE: CPT | Performed by: EMERGENCY MEDICINE

## 2023-09-13 PROCEDURE — 74177 CT ABD & PELVIS W/CONTRAST: CPT

## 2023-09-13 PROCEDURE — 82947 ASSAY GLUCOSE BLOOD QUANT: CPT | Performed by: EMERGENCY MEDICINE

## 2023-09-13 PROCEDURE — 81001 URINALYSIS AUTO W/SCOPE: CPT | Performed by: EMERGENCY MEDICINE

## 2023-09-13 PROCEDURE — 250N000013 HC RX MED GY IP 250 OP 250 PS 637: Performed by: EMERGENCY MEDICINE

## 2023-09-13 PROCEDURE — 80053 COMPREHEN METABOLIC PANEL: CPT

## 2023-09-13 PROCEDURE — 82803 BLOOD GASES ANY COMBINATION: CPT

## 2023-09-13 RX ORDER — HYDROXYZINE HYDROCHLORIDE 25 MG/1
25 TABLET, FILM COATED ORAL EVERY 6 HOURS PRN
Status: DISCONTINUED | OUTPATIENT
Start: 2023-09-13 | End: 2023-09-14 | Stop reason: HOSPADM

## 2023-09-13 RX ORDER — CELECOXIB 50 MG/1
50 CAPSULE ORAL 2 TIMES DAILY PRN
Status: DISCONTINUED | OUTPATIENT
Start: 2023-09-13 | End: 2023-09-14 | Stop reason: HOSPADM

## 2023-09-13 RX ORDER — LIDOCAINE 40 MG/G
CREAM TOPICAL
Status: DISCONTINUED | OUTPATIENT
Start: 2023-09-13 | End: 2023-09-14 | Stop reason: HOSPADM

## 2023-09-13 RX ORDER — FAMOTIDINE 20 MG/1
20 TABLET, FILM COATED ORAL 2 TIMES DAILY
Status: DISCONTINUED | OUTPATIENT
Start: 2023-09-13 | End: 2023-09-14 | Stop reason: HOSPADM

## 2023-09-13 RX ORDER — HYDROMORPHONE HYDROCHLORIDE 1 MG/ML
0.5 INJECTION, SOLUTION INTRAMUSCULAR; INTRAVENOUS; SUBCUTANEOUS
Status: DISCONTINUED | OUTPATIENT
Start: 2023-09-13 | End: 2023-09-14 | Stop reason: HOSPADM

## 2023-09-13 RX ORDER — ACETAMINOPHEN 500 MG
1000 TABLET ORAL ONCE
Status: COMPLETED | OUTPATIENT
Start: 2023-09-13 | End: 2023-09-13

## 2023-09-13 RX ORDER — ONDANSETRON 4 MG/1
4 TABLET, ORALLY DISINTEGRATING ORAL EVERY 6 HOURS PRN
Status: DISCONTINUED | OUTPATIENT
Start: 2023-09-13 | End: 2023-09-14 | Stop reason: HOSPADM

## 2023-09-13 RX ORDER — IOPAMIDOL 755 MG/ML
127 INJECTION, SOLUTION INTRAVASCULAR ONCE
Status: COMPLETED | OUTPATIENT
Start: 2023-09-13 | End: 2023-09-13

## 2023-09-13 RX ORDER — ACETAMINOPHEN 325 MG/1
975 TABLET ORAL EVERY 8 HOURS
Status: DISCONTINUED | OUTPATIENT
Start: 2023-09-13 | End: 2023-09-14 | Stop reason: HOSPADM

## 2023-09-13 RX ORDER — METHOCARBAMOL 750 MG/1
750 TABLET, FILM COATED ORAL 4 TIMES DAILY PRN
Status: DISCONTINUED | OUTPATIENT
Start: 2023-09-13 | End: 2023-09-14 | Stop reason: HOSPADM

## 2023-09-13 RX ORDER — HYDROMORPHONE HYDROCHLORIDE 1 MG/ML
0.3 INJECTION, SOLUTION INTRAMUSCULAR; INTRAVENOUS; SUBCUTANEOUS
Status: DISCONTINUED | OUTPATIENT
Start: 2023-09-13 | End: 2023-09-14 | Stop reason: HOSPADM

## 2023-09-13 RX ORDER — ONDANSETRON 2 MG/ML
4 INJECTION INTRAMUSCULAR; INTRAVENOUS EVERY 6 HOURS PRN
Status: DISCONTINUED | OUTPATIENT
Start: 2023-09-13 | End: 2023-09-14 | Stop reason: HOSPADM

## 2023-09-13 RX ORDER — ENOXAPARIN SODIUM 100 MG/ML
40 INJECTION SUBCUTANEOUS EVERY 24 HOURS
Status: DISCONTINUED | OUTPATIENT
Start: 2023-09-13 | End: 2023-09-14 | Stop reason: HOSPADM

## 2023-09-13 RX ADMIN — HYDROMORPHONE HYDROCHLORIDE 0.5 MG: 1 INJECTION, SOLUTION INTRAMUSCULAR; INTRAVENOUS; SUBCUTANEOUS at 14:15

## 2023-09-13 RX ADMIN — OXYCODONE HYDROCHLORIDE 15 MG: 5 TABLET ORAL at 19:59

## 2023-09-13 RX ADMIN — ACETAMINOPHEN 1000 MG: 500 TABLET ORAL at 15:34

## 2023-09-13 RX ADMIN — HYDROMORPHONE HYDROCHLORIDE 0.5 MG: 1 INJECTION, SOLUTION INTRAMUSCULAR; INTRAVENOUS; SUBCUTANEOUS at 17:57

## 2023-09-13 RX ADMIN — METHOCARBAMOL 750 MG: 750 TABLET ORAL at 15:34

## 2023-09-13 RX ADMIN — ACETAMINOPHEN 975 MG: 325 TABLET, FILM COATED ORAL at 19:59

## 2023-09-13 RX ADMIN — FAMOTIDINE 20 MG: 20 TABLET ORAL at 19:59

## 2023-09-13 RX ADMIN — IOPAMIDOL 127 ML: 755 INJECTION, SOLUTION INTRAVENOUS at 15:24

## 2023-09-13 RX ADMIN — HYDROXYZINE HYDROCHLORIDE 25 MG: 25 TABLET, FILM COATED ORAL at 15:34

## 2023-09-13 ASSESSMENT — ACTIVITIES OF DAILY LIVING (ADL)
ADLS_ACUITY_SCORE: 41
ADLS_ACUITY_SCORE: 37
ADLS_ACUITY_SCORE: 41
ADLS_ACUITY_SCORE: 41

## 2023-09-13 NOTE — ED NOTES
Bed: ED35  Expected date:   Expected time:   Means of arrival:   Comments:  [address] had a recent reverse colectomy, severe abd pain, incontinent of bowel and bladder now too

## 2023-09-13 NOTE — TELEPHONE ENCOUNTER
"    Nurse Triage SBAR    Is this a 2nd Level Triage? YES, LICENSED PRACTITIONER REVIEW IS REQUIRED    Situation: Patient with terrible stomach pain.     Large amount of blood mucous coming from rectum and states urine is also passing from rectum as well.  No bladder control.    Background:   COMPLETION PROCTECTOMY, ROBOT-ASSISTED, ILEAL POUCH ANASTAMOSIS N/A General with Block   Sigmoidoscopy Quinton Oliveira MD  on  09-    Assessment:   RN unable to reach Colorectal nurses by phone  Patient states bad stomach ache  Blood mucous coming from rectum, > 1 cup  States not in control of bladder.  Urine is also passing from rectum.    She was to see her PCP for wound check as well today.      Protocol Recommended Disposition:   GO to ED    Routed to provider          Reason for Disposition   Sounds like a serious complication to the triager    Additional Information   Negative: Sounds like a life-threatening emergency to the triager   Negative: Bright red, wide-spread, sunburn-like rash    Answer Assessment - Initial Assessment Questions  1. SYMPTOM: \"What's the main symptom you're concerned about?\" (e.g., pain, fever, vomiting)      Stomach hurting since last evening,  now she has > 1 cup of bloody mucous coming from rectum and bladder is involuntary and she states coming out her rectum as well.  2. ONSET: \"When did   start?\"      today  3. SURGERY: \"What surgery did you have?\"      COMPLETION PROCTECTOMY, ROBOT-ASSISTED, ILEAL POUCH ANASTAMOSIS N/A General with Block  Sigmoidoscopy Quinton Oliveira MD  4. DATE of SURGERY: \"When was the surgery?\"       9-5-2023  5. ANESTHESIA: \" What type of anesthesia did you have?\" (e.g., general, spinal, epidural, local)        6. PAIN: \"Is there any pain?\" If Yes, ask: \"How bad is it?\"  (Scale 1-10; or mild, moderate, severe)      \"I can't tell if I have pain anymore or not\"  \"I am not supposed to be having anything from my " "butt\"  7. FEVER: \"Do you have a fever?\" If Yes, ask: \"What is your temperature, how was it measured, and when did it start?\"      no  8. VOMITING: \"Is there any vomiting?\" If Yes, ask: \"How many times?\"      no  9. BLEEDING: \"Is there any bleeding?\" If Yes, ask: \"How much?\" and \"Where?\"      Blood mucous from rectum  10. OTHER SYMPTOMS: \"Do you have any other symptoms?\" (e.g., drainage from wound, painful urination, constipation)        Patient is extremely upset, talking quickly in a high voice volume and crying off and on.   States she is to see her PCP within the next hour or so for wound packing d/t no Home Care nurse available for home care for her.    Protocols used: Post-Op Symptoms and Mmijnehmc-D-AD    "

## 2023-09-13 NOTE — H&P
Colorectal Surgery Admission History and Physical     Dorteha Yu MRN# [id number]   YOB: 1975 Age: 48 year old      Date of Admission:  9/13/2023    CC: Abdominal pain, hematochezia    Assessment: 48 year old female with complex medical history including ulcerative colitis s/p TAC with end ileostomy (laparoscopic) on 6/15/21 with subsequent parastomal hernia now s/p robotic proctectomy, ileostomy takedown, and creation of ileal J-pouch on 9/5/23 with Dr. Ram. Her postoperative course was complicated by uncontrolled pain and anxiety, of note has fibromyalgia c/b chronic pain at baseline. There was concern for enterovaginal fistula on POD2 due to concern for vaginal discharge, however this was thought to be less likely as a blood clot was seen in the vaginal vault leading us to believe this was residual bleeding from uteropexy vs simple menstrual bleeding. CT scan today showing no evidence of fistula. We will admit the patient with a plan to manage conservatively; attempt to achieve adequate pain control.    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 She is now s/p robotic proctectomy, ileostomy takedown, and creation of ileal J-pouch on 9/5/23. She was discharged home on 9/11.    She now presents to the ED with increased abdominal pain and incontinence of bloody stool. She states this began last night when she experienced  bladder cramping, and when she stood up to go to the bathroom she noticed fluid leaking down her legs consisting of what seemed to be a mix of blood, stool, mucus, and urine. She has been unable to discern whether the fluid is coming from her rectum or vagina, though she does note that previous to last night she was voiding urine with no blood or mucus and she had no recurrence of vaginal bleeding. She hadn't had blood per rectum prior to yesterday evening since her hospitalization.    This recent bout of incontinence has caused her significant stress and anxiety, which is believed to be compounding her ongoing inadequate pain control. She reports that she has not used her home medical marijuana (which usually helps her significantly for pain and anxiety) because she has simply been too exhausted. Writer discussed with patient that some bleeding is to be expected up to two weeks out from surgery, which she understands. She also states that she has had decreased ostomy output for the past 24 hours, however her ostomy had previously been filling up with gas and a small amount of stool.      Past Medical History:  Past Medical History:   Diagnosis Date    Abnormal MRI     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     Anxiety     Basal cell carcinoma     Cervical high risk HPV (human papillomavirus) test positive 2019    See problem list    Colitis     Depression     Diabetes mellitus, iatrogenic (H) 2020    Esophageal reflux     Inflammatory arthritis     Insomnia     Intestinal giardiasis 2018    Lumbago     left lower back pain    Lymphedema     Malignant melanoma (H)     Melanoma (H) 10/23/2017    Migraines     Mild persistent asthma     Morbid obesity with BMI of 40.0-44.9, adult (H)     STORMY (obstructive sleep apnea)     Prothrombin deficiency (H)     takes 81mg asa daily    Stroke (cerebrum) (H)     During     TIA (transient ischemic attack)     Type 2 diabetes  mellitus (H)     Ulcerative pancolitis (H)        Past Surgical History:  Past Surgical History:   Procedure Laterality Date    APPENDECTOMY      COLONOSCOPY N/A 10/18/2017    Procedure: COLONOSCOPY;  Colon;  Surgeon: Debbie Stephens MD;  [facility]: [facility]    COLONOSCOPY N/A 2018    Procedure: COMBINED COLONOSCOPY, SINGLE OR MULTIPLE BIOPSY/POLYPECTOMY BY BIOPSY;  colon;  Surgeon: Benita Schumacher MD;  Location: [facility]    COLONOSCOPY      multiple since  to present - about 6 total    DAVINCI ASSISTED TRANSANAL TOTAL MESORECTAL EXCISION N/A 2023    Procedure: COMPLETION PROCTECTOMY, ROBOT-ASSISTED, ILEAL POUCH ANASTAMOSIS;  Surgeon: Quinton Ram MD;  Location: [facility]    DISSECT LYMPH NODE AXILLA Left 10/23/2017    Procedure: DISSECT LYMPH NODE AXILLA;  Left Axillary Lymph Node Dissection ;  Surgeon: Laurent Cool MD;  Location: [facility]    EXAM UNDER ANESTHESIA PELVIC N/A 2020    Procedure: EXAM UNDER ANESTHESIA, PELVIS; with Cervical Biopsies, Vaginal Biopsy and Endocervical Curettings;  Surgeon: Melina Jung MD;  Location: [facility]    GYN SURGERY  ,         LAPAROSCOPIC ASSISTED COLECTOMY N/A 06/15/2021    Procedure: laparoscopic total abdominal colectomy, end ileostomy;  Surgeon: Quinton Ram MD;  Location: [facility]    REPAIR MOHS Left 2017    Procedure: REPAIR MOHS;  Left Upper Lid Moh's Reconstruction;  Surgeon: Kisha Bosch MD;  Location: [facility]    SIGMOIDOSCOPY FLEXIBLE N/A 2023    Procedure: Sigmoidoscopy flexible;  Surgeon: Quinton Ram MD;  Location: [facility]       Allergies:     Allergies   Allergen Reactions    Bee Venom Swelling    Azithromycin Diarrhea    Erythromycin      Other reaction(s): GI intolerance, Vomiting    Fentanyl Other (See Comments)     sweating  sweating    Prochlorperazine Fatigue     Other reaction(s): Other (see comments)  Fatigue    Buspirone      Other reaction(s): GI intolerance  vomiting     Erythrocin Nausea and Vomiting    Gluten Meal      Celiac disease    Topamax [Topiramate]      Made her lethargic    Zithromax [Azithromycin Dihydrate] Diarrhea    Enbrel [Etanercept] Hives and Rash       Medications:  lidocaine 1 % 9 mL  sodium bicarbonate 8.4 % injection 1 mEq    acetaminophen (TYLENOL) 325 MG tablet, Take 3 tablets (975 mg) by mouth every 6 hours  oxyCODONE (ROXICODONE) 5 MG tablet, Take 3 tablets (15 mg) by mouth every 4 hours  albuterol (PROAIR HFA/PROVENTIL HFA/VENTOLIN HFA) 108 (90 Base) MCG/ACT inhaler, Inhale 2 puffs into the lungs every 4 hours as needed for shortness of breath / dyspnea  [] clobetasol (TEMOVATE) 0.05 % external cream, Apply 1 Application topically 2 times daily  famotidine (PEPCID) 20 MG tablet, Take 1 tablet (20 mg) by mouth 2 times daily  hydrOXYzine (ATARAX) 25 MG tablet, Take 1 tablet (25 mg) by mouth every 6 hours as needed for other (adjuvant pain)  medical cannabis (Patient's own supply), See Admin Instructions (The purpose of this order is to document that the patient reports taking medical cannabis.  This is not a prescription, and is not used to certify that the patient has a qualifying medical condition.)  menthol (ICY HOT) 5 % PTCH, Apply 1 patch topically every 8 hours as needed for muscle soreness  metFORMIN (GLUCOPHAGE) 500 MG tablet, TAKE 2 TABLETS BY MOUTH 2 TIMES DAILY WITH MEALS (Patient taking differently: Take 500 mg by mouth 2 times daily (with meals) TAKE 2 TABLETS BY MOUTH 2 TIMES DAILY WITH MEALS)  methocarbamol (ROBAXIN) 750 MG tablet, Take 1 tablet (750 mg) by mouth 4 times daily as needed for muscle spasms  ondansetron (ZOFRAN ODT) 4 MG ODT tab, Take 1 tablet (4 mg) by mouth every 8 hours as needed for nausea or vomiting (Patient taking differently: Take 4 mg by mouth as needed for nausea or vomiting)  Ostomy Supplies MISC, 20 each daily  oxyCODONE IR (ROXICODONE) 5 MG tablet, Take 2 tablets (10 mg) by mouth every 4 hours  phentermine  "(ADIPEX-P) 15 MG capsule, Take 15 mg by mouth every morning  venlafaxine (EFFEXOR) 100 MG tablet, Take 1 tablet (100 mg) by mouth 2 times daily Total of 150 mg bid  venlafaxine (EFFEXOR) 50 MG tablet, Take 1 tablet (50 mg) by mouth 2 times daily Total of 150 mg twice daily        Medications prior to Admission:  (Not in a hospital admission)     Social History:  Social History     Socioeconomic History    Marital status:      Spouse name: Not on file    Number of children: Not on file    Years of education: Not on file    Highest education level: Not on file   Occupational History    Not on file   Tobacco Use    Smoking status: Former     Packs/day: 1.00     Years: 5.00     Pack years: 5.00     Types: Cigarettes     Quit date: 3/20/1998     Years since quittin.5    Smokeless tobacco: Never   Vaping Use    Vaping Use: Never used   Substance and Sexual Activity    Alcohol use: Not Currently    Drug use: Yes     Types: Marijuana     Comment: vape, edible    Sexual activity: Not Currently     Partners: Male     Birth control/protection: Surgical   Other Topics Concern    Parent/sibling w/ CABG, MI or angioplasty before 65F 55M? No   Social History Narrative    20 y.o- patient's mother   of lung cancer. She had to take care of her younger sister.    2012- patient's  had a heart attack with stents placed, followed by cardiac rehabilitation    2000 TO 2012  was in [facility] for depression    2013 patient's  went through alcohol rehabilitation at [facility] inpatient            They have attended couple counseling a couple of times and patient went to the family program for chemical dependency.    Patient denies alcohol or drug use and herself            Has 2 children, girls ages 17 and 14. Oldest has been a \"trouble maker.\"     For a while she was working 3 jobs since her  was ill. 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 Reports her job is very stressful.         Social Determinants of Health     Financial Resource Strain: High Risk (6/28/2021)    Overall Financial Resource Strain (CARDIA)     Difficulty of Paying Living Expenses: Very hard   Food Insecurity: No Food Insecurity (6/28/2021)    Hunger Vital Sign     Worried About Running Out of Food in the Last Year: Never true     Ran Out of Food in the Last Year: Never true   Transportation Needs: No Transportation Needs (6/28/2021)    PRAPARE - Transportation     Lack of Transportation (Medical): No     Lack of Transportation (Non-Medical): No   Physical Activity: Not on file   Stress: Stress Concern Present (6/25/2021)    [employer] - Occupational Stress Questionnaire     Feeling of Stress : Very much   Social Connections: Unknown (6/25/2021)    Social Connection and Isolation Panel [NHANES]     Frequency of Communication with Friends and Family: Not asked     Frequency of Social Gatherings with Friends and Family: Not asked     Attends Restorationism Services: Not asked     Active Member of Clubs or Organizations: Not asked     Attends Club or Organization Meetings: Not asked     Marital Status:    Intimate Partner Violence: Unknown (6/25/2021)    Humiliation, Afraid, Rape, and Kick questionnaire     Fear of Current or Ex-Partner: Not asked     Emotionally Abused: Not asked     Physically Abused: Not asked     Sexually Abused: Not asked   Housing Stability: Not on file       Family History:  Family History   Problem Relation Age of Onset    Cancer Mother 45        lung    Neurologic Disorder Mother         epilepsy    Lipids Father     Gastrointestinal Disease Father         diverticulitis     Depression Father     Colitis Father     Colon Cancer Father     Diverticulitis Father     Depression Sister     Cancer Maternal Grandmother     Blood Disease Maternal Grandmother         lymphoma     Arthritis Maternal Grandmother      "Diabetes Maternal Grandmother     Depression Maternal Grandmother     Macular Degeneration Maternal Grandmother     Glaucoma Maternal Grandmother     Diabetes Maternal Grandfather     Cerebrovascular Disease Maternal Grandfather     Blood Disease Maternal Grandfather     Heart Disease Maternal Grandfather     Glaucoma Maternal Grandfather     Cancer Paternal Grandmother     Cancer - colorectal Paternal Grandmother     Colitis Paternal Grandmother     Colon Cancer Paternal Grandmother     Diverticulitis Paternal Grandmother     Respiratory Paternal Grandfather         emphysema     Colitis Paternal Grandfather     Colon Cancer Paternal Grandfather     Diverticulitis Paternal Grandfather     Heart Disease Daughter     Asthma Daughter     Melanoma No family hx of     Anesthesia Reaction No family hx of     Clotting Disorder No family hx of        ROS:  The remainder of the complete ROS was negative unless noted in the HPI.    Exam:  /75   Pulse 84   Temp 98.6  F (37  C) (Oral)   Ht 1.588 m (5' 2.5\")   Wt 93.9 kg (207 lb)   LMP 08/01/2023 (Approximate)   SpO2 95%   BMI 37.26 kg/m    General: Alert, interactive, NAD  Resp: CTAB, no crackles or wheezes  Cardiac: RRR, NS1,S2, No m/r/g  Abdomen: Soft, globally tender though notably in the RLQ and suprapubic area, nondistended. +BS.  No HSM or masses, no rebound or guarding.  Extremities: No LE edema or obvious joint abnormalities  Skin: Warm and dry, no jaundice or rash    Labs:  BMP  Recent Labs   Lab 09/13/23  1411 09/13/23  1410 09/11/23  0641 09/10/23  0938 09/09/23  1002    138  --   --   --    POTASSIUM 4.0 3.9 3.7 3.9 3.2*   CHLORIDE 100  --   --   --   --    CO2 26  --   --   --   --    BUN 8.1  --   --   --   --    CR 0.83  --   --   --   --    GLC 87 86  --   --   --    MAG 1.9  --  1.9 1.9 1.7     CBC  Recent Labs   Lab 09/13/23  1411 09/13/23  1410 09/10/23  0938   WBC 17.2*  --   --    HGB 10.8* 10.9*  --    HCT 33.2* 32*  --    *  --  " 393       Imaging:  Results for orders placed or performed during the hospital encounter of 09/13/23   CT Abdomen Pelvis w Contrast    Impression    IMPRESSION:   1.  Postoperative changes of completion proctectomy, ileostomy  takedown, and creation of ileal J-pouch with ileoanal pouch anal  anastomosis and right lower quadrant diverting ileostomy. The distal  bowel at the level of the anastomosis abuts the vaginal canal within  the pelvis without definite communication to suggest fistula tract.  Tiny foci of pneumoperitoneum in the right lower quadrant and under  the right hemidiaphragm, edema in the lower abdomen, and small volume  free fluid in the pelvis, likely postoperative.  2.  Bibasilar dependent consolidative opacities in the lungs, likely  atelectasis.    I have personally reviewed the examination and initial interpretation  and I agree with the findings.    KEESHA WALKER,          SYSTEM ID:  [id number]           The patient was discussed with the CRS fellow, Dr. Hernandez, on 9/13    The above plan of care was performed and communicated to me by CRS Staff: Dr. Quinton Ram     I spent 45 minute face-to-face or coordinating care of Doretha Yu. Over 50% of our time on the unit was spent counseling the patient and/or coordinating care as documented in the assessment and plan.        Lowell Bowman MD  General Surgery PGY-1  Colon and Rectal Surgery  Pager: [phone]

## 2023-09-13 NOTE — ED PROVIDER NOTES
"ED Provider Note  [facility]      History   No chief complaint on file.    HPI  Doretha Yu is a 48 year old female with a history of ulcerative colitis s/p TAC with end ileostomy (laparoscopic) on 6/15/21 with subsequent parastomal hernia, inflammatory arthritis 2/2 immunotherapy, chronic prednisone use, DM II,  CVA (2004), s/p robotic proctectomy, ileostomy takedown, and creation of ileal J-pouch (9/5/2023) who presents to the ED for evaluation of stool and urinary incontinence.  She states that this morning she feels that \"everything is falling out of her\" and she cannot control her urinary flow at all.  She previously had dribbling but now she states she has had total urinary incontinence in her bed.  In addition, she feels that she is having bleeding and stool coming from the rectum.  She is in 10/10 pain.  She took her oxycodone this morning at 8am which didn't provide any relief.  She received IM Dilaudid from EMS which did provide some relief.  She denies any dysuria, fevers, chills, nausea, vomiting.  She did have output from her ileostomy earlier this morning.  She changed her bag and has not had any output since then.  Her previous output appeared like normal stool.      Past Medical History  Past Medical History:   Diagnosis Date    Abnormal MRI     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     Anxiety     Basal cell carcinoma     Cervical high risk HPV (human papillomavirus) test positive 12/13/2019    See problem list    Colitis     Depression     Diabetes mellitus, iatrogenic (H) 01/28/2020    Esophageal reflux     Inflammatory arthritis     Insomnia     Intestinal giardiasis 03/05/2018    Lumbago     left lower back pain    Lymphedema     Malignant melanoma (H)     Melanoma (H) 10/23/2017    Migraines     Mild persistent asthma     Morbid obesity with BMI of 40.0-44.9, adult (H)     STORMY (obstructive sleep apnea)     Prothrombin deficiency (H)     takes 81mg " asa daily    Stroke (cerebrum) (H)     During     TIA (transient ischemic attack)     Type 2 diabetes mellitus (H)     Ulcerative pancolitis (H)      Past Surgical History:   Procedure Laterality Date    APPENDECTOMY      COLONOSCOPY N/A 10/18/2017    Procedure: COLONOSCOPY;  Colon;  Surgeon: Debbie Stephens MD;  [facility]    COLONOSCOPY N/A 2018    Procedure: COMBINED COLONOSCOPY, SINGLE OR MULTIPLE BIOPSY/POLYPECTOMY BY BIOPSY;  colon;  Surgeon: Benita Schumacher MD;  Location: [facility]    COLONOSCOPY      multiple since  to present - about 6 total    DAVINCI ASSISTED TRANSANAL TOTAL MESORECTAL EXCISION N/A 2023    Procedure: COMPLETION PROCTECTOMY, ROBOT-ASSISTED, ILEAL POUCH ANASTAMOSIS;  Surgeon: Quinton Ram MD;  [facility]    DISSECT LYMPH NODE AXILLA Left 10/23/2017    Procedure: DISSECT LYMPH NODE AXILLA;  Left Axillary Lymph Node Dissection ;  Surgeon: Laurent Cool MD;  [facility]: [facility]    EXAM UNDER ANESTHESIA PELVIC N/A 2020    Procedure: EXAM UNDER ANESTHESIA, PELVIS; with Cervical Biopsies, Vaginal Biopsy and Endocervical Curettings;  Surgeon: Melina Jung MD;  Location: [facility]    GYN SURGERY  ,         LAPAROSCOPIC ASSISTED COLECTOMY N/A 06/15/2021    Procedure: laparoscopic total abdominal colectomy, end ileostomy;  Surgeon: Quinton Ram MD;  [facility]    REPAIR MOHS Left 2017    Procedure: REPAIR MOHS;  Left Upper Lid Moh's Reconstruction;  Surgeon: Kisha Bosch MD;  [facility]    SIGMOIDOSCOPY FLEXIBLE N/A 2023    Procedure: Sigmoidoscopy flexible;  Surgeon: Quinton Ram MD;  Location: [facility]     acetaminophen (TYLENOL) 325 MG tablet  albuterol (PROAIR HFA/PROVENTIL HFA/VENTOLIN HFA) 108 (90 Base) MCG/ACT inhaler  famotidine (PEPCID) 20 MG tablet  hydrOXYzine (ATARAX) 25 MG tablet  medical cannabis (Patient's own supply)  menthol (ICY HOT) 5 % PTCH  metFORMIN  (GLUCOPHAGE) 500 MG tablet  methocarbamol (ROBAXIN) 750 MG tablet  ondansetron (ZOFRAN ODT) 4 MG ODT tab  Ostomy Supplies MISC  oxyCODONE (ROXICODONE) 5 MG tablet  oxyCODONE IR (ROXICODONE) 5 MG tablet  phentermine (ADIPEX-P) 15 MG capsule  venlafaxine (EFFEXOR) 100 MG tablet  venlafaxine (EFFEXOR) 50 MG tablet      Allergies   Allergen Reactions    Bee Venom Swelling    Azithromycin Diarrhea    Erythromycin      Other reaction(s): GI intolerance, Vomiting    Fentanyl Other (See Comments)     sweating  sweating    Prochlorperazine Fatigue     Other reaction(s): Other (see comments)  Fatigue    Buspirone      Other reaction(s): GI intolerance  vomiting    Erythrocin Nausea and Vomiting    Gluten Meal      Celiac disease    Topamax [Topiramate]      Made her lethargic    Zithromax [Azithromycin Dihydrate] Diarrhea    Enbrel [Etanercept] Hives and Rash     Family History  Family History   Problem Relation Age of Onset    Cancer Mother 45        lung    Neurologic Disorder Mother         epilepsy    Lipids Father     Gastrointestinal Disease Father         diverticulitis     Depression Father     Colitis Father     Colon Cancer Father     Diverticulitis Father     Depression Sister     Cancer Maternal Grandmother     Blood Disease Maternal Grandmother         lymphoma     Arthritis Maternal Grandmother     Diabetes Maternal Grandmother     Depression Maternal Grandmother     Macular Degeneration Maternal Grandmother     Glaucoma Maternal Grandmother     Diabetes Maternal Grandfather     Cerebrovascular Disease Maternal Grandfather     Blood Disease Maternal Grandfather     Heart Disease Maternal Grandfather     Glaucoma Maternal Grandfather     Cancer Paternal Grandmother     Cancer - colorectal Paternal Grandmother     Colitis Paternal Grandmother     Colon Cancer Paternal Grandmother     Diverticulitis Paternal Grandmother     Respiratory Paternal Grandfather         emphysema     Colitis Paternal Grandfather      Colon Cancer Paternal Grandfather     Diverticulitis Paternal Grandfather     Heart Disease Daughter     Asthma Daughter     Melanoma No family hx of     Anesthesia Reaction No family hx of     Clotting Disorder No family hx of      Social History   Social History     Tobacco Use    Smoking status: Former     Packs/day: 1.00     Years: 5.00     Pack years: 5.00     Types: Cigarettes     Quit date: 3/20/1998     Years since quittin.5    Smokeless tobacco: Never   Vaping Use    Vaping Use: Never used   Substance Use Topics    Alcohol use: Not Currently    Drug use: Yes     Types: Marijuana     Comment: vape, edible      Past medical history, past surgical history, medications, allergies, family history, and social history were reviewed with the patient. No additional pertinent items.      A complete review of systems was performed with pertinent positives and negatives noted in the HPI, and all other systems negative.    Physical Exam      Physical Exam  General: Tearful  HENT: Normocephalic and atraumatic. Trachea midline.  Eyes: EOMI, conjunctivae normal.  Normal voice.  Cardiovascular:  Normal rate and regular rhythm.   No murmur heard.  Radial pulses 2+ bilaterally.  Pulmonary:  No respiratory distress. Normal breath sounds bilaterally.  Abdominal: no distension.  Abdomen is soft. There is no mass.  Moderately tender to right upper and right lower quadrant.  No rebound or guarding.  Incisions appear well-healing.  Musculoskeletal:    Moving all extremities spontaneously.  No cyanosis, clubbing, or edema.  Skin: Warm, dry, and well perfused. Good turgor.  Neurological:  No focal deficit present.      ED Course, Procedures, & Data      Procedures            No results found for any visits on 23.  Medications - No data to display  Labs Ordered and Resulted from Time of ED Arrival to Time of ED Departure - No data to display  No orders to display          Critical care was not performed.     Medical  Decision Making  The patient's presentation was of moderate complexity (an undiagnosed new problem with uncertain diagnosis).    The patient's evaluation involved:  review of external note(s) from 3+ sources (see separate area of note for details)  ordering and/or review of 3+ test(s) in this encounter (see separate area of note for details)  review of 3+ test result(s) ordered prior to this encounter (see separate area of note for details)    The patient's management necessitated high risk, hospital admission    Assessment & Plan    Patient arrived due to uncontrolled pain postoperatively, as well as stating that she has no control over her urinary and bowel movements.  Urinalysis negative for urinary tract infection.  Urine pregnancy test negative.  Lactic acid 1.1.  Leukocytosis of 17.2.  Hemoglobin 10.8, stable.  CT without evidence of acute infection, however unable to rule out rectovaginal fistula.  Patient still has pain and requesting more pain medication after receiving a couple doses of Dilaudid.  I had also ordered her home pain medication regimen without relief.  After evaluation, she states her pain is still a 7 out of 10.  She [place] by [address] and felt too weak to drive, calling an ambulance.  I think it is reasonable to stay in the hospital for pain control and observation.  I spoke with the colorectal surgery team who will admit the patient under Dr. Ram.  Patient was agreeable and her questions were answered.    I have reviewed the nursing notes. I have reviewed the findings, diagnosis, plan and need for follow up with the patient.    New Prescriptions    No medications on file       Final diagnoses:   None     [facility] EMERGENCY DEPARTMENT  9/13/2023     Estefani Mireles MD  09/13/23 1937

## 2023-09-13 NOTE — ED TRIAGE NOTES
"Pt biba from home w/ c/o stool and urinary incontinence. Pt states this am she was covered in a mixture of stool, urine, and blood. Unable to discern if blood was per rectum or in urine. Hx recent proctectomy w/ ileostomy placement. States no output from ileostomy \"it's all coming out of my rectum.\" Further endorses abd pain and distention/bloating- \"it feels like I'm pregnant.\"        "

## 2023-09-13 NOTE — PROGRESS NOTES
Clinic Care Coordination Contact  [facility]/Voicemail       Clinical Data: Care Coordinator Outreach  Outreach attempted x 3.  Left message on patient's voicemail with call back information and requested return call.  Plan: Care Coordinator sent care coordination introduction letter on 9/12/23 via [employer]. Care Coordinator will do no further outreaches at this time.    Rhys Dominguez MSN, RN, PHN, CCM   Primary Care Clinical RN Care Coordinator  [facility]  9/13/2023   9:02 AM  [email or web address]  Office: [phone]

## 2023-09-14 ENCOUNTER — HOSPITAL ENCOUNTER (EMERGENCY)
Facility: CLINIC | Age: 48
Discharge: HOME | End: 2023-09-14
Attending: FAMILY MEDICINE | Admitting: FAMILY MEDICINE
Payer: COMMERCIAL

## 2023-09-14 ENCOUNTER — TELEPHONE (OUTPATIENT)
Dept: SURGERY | Facility: CLINIC | Age: 48
End: 2023-09-14

## 2023-09-14 ENCOUNTER — NURSE TRIAGE (OUTPATIENT)
Dept: FAMILY MEDICINE | Facility: CLINIC | Age: 48
End: 2023-09-14
Payer: COMMERCIAL

## 2023-09-14 VITALS
DIASTOLIC BLOOD PRESSURE: 76 MMHG | WEIGHT: 209 LBS | HEART RATE: 88 BPM | HEIGHT: 62 IN | RESPIRATION RATE: 18 BRPM | SYSTOLIC BLOOD PRESSURE: 113 MMHG | BODY MASS INDEX: 38.46 KG/M2 | TEMPERATURE: 98.5 F | OXYGEN SATURATION: 99 %

## 2023-09-14 DIAGNOSIS — G89.18 ACUTE POST-OPERATIVE PAIN: ICD-10-CM

## 2023-09-14 DIAGNOSIS — N39.41 URGE INCONTINENCE: ICD-10-CM

## 2023-09-14 DIAGNOSIS — K51.919 ULCERATIVE COLITIS WITH COMPLICATION, UNSPECIFIED LOCATION (H): ICD-10-CM

## 2023-09-14 DIAGNOSIS — Z90.49 S/P COLECTOMY: ICD-10-CM

## 2023-09-14 LAB
ALBUMIN SERPL BCG-MCNC: 4.1 G/DL (ref 3.5–5.2)
ALP SERPL-CCNC: 156 U/L (ref 35–104)
ALT SERPL W P-5'-P-CCNC: 23 U/L (ref 0–50)
ANION GAP SERPL CALCULATED.3IONS-SCNC: 14 MMOL/L (ref 7–15)
AST SERPL W P-5'-P-CCNC: 24 U/L (ref 0–45)
BASOPHILS # BLD AUTO: 0.1 10E3/UL (ref 0–0.2)
BASOPHILS NFR BLD AUTO: 1 %
BILIRUB SERPL-MCNC: <0.2 MG/DL
BUN SERPL-MCNC: 8.7 MG/DL (ref 6–20)
CALCIUM SERPL-MCNC: 10.3 MG/DL (ref 8.6–10)
CHLORIDE SERPL-SCNC: 100 MMOL/L (ref 98–107)
CREAT SERPL-MCNC: 0.92 MG/DL (ref 0.51–0.95)
DEPRECATED HCO3 PLAS-SCNC: 25 MMOL/L (ref 22–29)
EGFRCR SERPLBLD CKD-EPI 2021: 77 ML/MIN/1.73M2
EOSINOPHIL # BLD AUTO: 0.4 10E3/UL (ref 0–0.7)
EOSINOPHIL NFR BLD AUTO: 3 %
ERYTHROCYTE [DISTWIDTH] IN BLOOD BY AUTOMATED COUNT: 14.5 % (ref 10–15)
GLUCOSE SERPL-MCNC: 123 MG/DL (ref 70–99)
HCT VFR BLD AUTO: 35.3 % (ref 35–47)
HGB BLD-MCNC: 11.9 G/DL (ref 11.7–15.7)
HOLD SPECIMEN: NORMAL
HOLD SPECIMEN: NORMAL
IMM GRANULOCYTES # BLD: 0.2 10E3/UL
IMM GRANULOCYTES NFR BLD: 2 %
LYMPHOCYTES # BLD AUTO: 2.6 10E3/UL (ref 0.8–5.3)
LYMPHOCYTES NFR BLD AUTO: 17 %
MCH RBC QN AUTO: 27.4 PG (ref 26.5–33)
MCHC RBC AUTO-ENTMCNC: 33.7 G/DL (ref 31.5–36.5)
MCV RBC AUTO: 81 FL (ref 78–100)
MONOCYTES # BLD AUTO: 1.1 10E3/UL (ref 0–1.3)
MONOCYTES NFR BLD AUTO: 7 %
NEUTROPHILS # BLD AUTO: 10.7 10E3/UL (ref 1.6–8.3)
NEUTROPHILS NFR BLD AUTO: 70 %
NRBC # BLD AUTO: 0 10E3/UL
NRBC BLD AUTO-RTO: 0 /100
PLAT MORPH BLD: NORMAL
PLATELET # BLD AUTO: 513 10E3/UL (ref 150–450)
POTASSIUM SERPL-SCNC: 4.2 MMOL/L (ref 3.4–5.3)
PROT SERPL-MCNC: 7.5 G/DL (ref 6.4–8.3)
RBC # BLD AUTO: 4.34 10E6/UL (ref 3.8–5.2)
RBC MORPH BLD: NORMAL
SODIUM SERPL-SCNC: 139 MMOL/L (ref 136–145)
WBC # BLD AUTO: 15 10E3/UL (ref 4–11)

## 2023-09-14 PROCEDURE — 99285 EMERGENCY DEPT VISIT HI MDM: CPT | Mod: 25 | Performed by: FAMILY MEDICINE

## 2023-09-14 PROCEDURE — 85025 COMPLETE CBC W/AUTO DIFF WBC: CPT | Performed by: FAMILY MEDICINE

## 2023-09-14 PROCEDURE — 36415 COLL VENOUS BLD VENIPUNCTURE: CPT | Performed by: FAMILY MEDICINE

## 2023-09-14 PROCEDURE — 250N000011 HC RX IP 250 OP 636: Performed by: FAMILY MEDICINE

## 2023-09-14 PROCEDURE — 76857 US EXAM PELVIC LIMITED: CPT | Mod: 26 | Performed by: FAMILY MEDICINE

## 2023-09-14 PROCEDURE — 76857 US EXAM PELVIC LIMITED: CPT | Performed by: FAMILY MEDICINE

## 2023-09-14 PROCEDURE — 80053 COMPREHEN METABOLIC PANEL: CPT | Performed by: FAMILY MEDICINE

## 2023-09-14 PROCEDURE — 96374 THER/PROPH/DIAG INJ IV PUSH: CPT | Performed by: FAMILY MEDICINE

## 2023-09-14 PROCEDURE — 36416 COLLJ CAPILLARY BLOOD SPEC: CPT | Performed by: FAMILY MEDICINE

## 2023-09-14 RX ORDER — TOLTERODINE 2 MG/1
2-4 CAPSULE, EXTENDED RELEASE ORAL DAILY
Qty: 10 CAPSULE | Refills: 0 | Status: SHIPPED | OUTPATIENT
Start: 2023-09-14 | End: 2023-10-31

## 2023-09-14 RX ORDER — LORAZEPAM 2 MG/ML
1 INJECTION INTRAMUSCULAR ONCE
Status: COMPLETED | OUTPATIENT
Start: 2023-09-14 | End: 2023-09-14

## 2023-09-14 RX ADMIN — LORAZEPAM 1 MG: 2 INJECTION INTRAMUSCULAR; INTRAVENOUS at 11:29

## 2023-09-14 ASSESSMENT — ENCOUNTER SYMPTOMS
WHEEZING: 0
BLOOD IN STOOL: 1
SORE THROAT: 0
DIAPHORESIS: 0
COUGH: 0
SINUS PRESSURE: 0
DIARRHEA: 1
PALPITATIONS: 0
HEADACHES: 0
FEVER: 0
CONSTIPATION: 0
ABDOMINAL PAIN: 1
DYSURIA: 0
SHORTNESS OF BREATH: 0
CHILLS: 0
NAUSEA: 0
VOMITING: 0
FREQUENCY: 0

## 2023-09-14 ASSESSMENT — ACTIVITIES OF DAILY LIVING (ADL)
ADLS_ACUITY_SCORE: 37

## 2023-09-14 NOTE — ED NOTES
"RN was down the hallway and heard hysterical screaming outside the door. Writer went to go check to see if everything was okay and found patient in hallway patient crying very loud and yelling and screaming \"Now I just pissed myself. (Puddle of pee on floor)  Alisa been treated like garbage here! I want to leave.\" Writer tried to calm patient down and explained that we are all very sorry that we aren't able to get patient her own ED bed and ED is over capacity but we are alll trying our best . Patient still remained screaming. Writer stated we need to get you to the bathroom to help clean you up and then we can take things step by step. Patient agreed and writer and TEJINDER Hilton helped change patient in bathroom. Patient provided new pants and put into wheelchair.     Patient still demanded to leave AMA. \"I want this fucking thing out of my arm.\"Writer went to go check with charge and primary and gave the okay that she is okay to leave AMA. Patient signed AMA paperwork and IV removed. Writer then asked how patient will be getting home while being taken outside as demanded. \" Why the fuck do you care?\" Writer explained that she wants to make sure patient makes it safely to car and that when ride comes she can come back out to lobby and help patient get into car. Patient stated \"The only reason you all fucking care now is because I threw a fit in the hallway!\" Writer heard patient talking on phone with family member or friend and ride wont be here for a while so stated \"I'm going to bring you back inside so your not waiting out here and that way I can help you get into car.\"  As soon as patient was parked in waiting room, patient got up from wheelchair and yelled \"Good luck everyone! You're not going to receive any help from this fuckin' hosptial. No one cares here.\" Patient then yelled at writer . \"Leave me the fuck alone! I don't want your help.\"   Charge and primary RN made aware.      "

## 2023-09-14 NOTE — ED NOTES
I was notified patient was upset with being in the hallway. Multiples RN's explained to patient that the ED is over capacity and there is multiple patients in the hallway. Patient still upset and demanding to leave AMA. Inpatient team paged and notified but patient already leaving. IV removed. Patient refusing to wait for primary team. Patient demanding to wait outside and refusing any help. AMA paperwork signed.

## 2023-09-14 NOTE — ED TRIAGE NOTES
Pt was recently released from [facility] for colon surgery, pt had an ileostomy take down and another one placed. Pt reports she was having urinary incontinence and and leaking form her rectum. Pt was seen at the [facility], saw by her surgeon, was to be admitted. Pt left AMA after waiting 12 hours and no one had checked on her. Pt reports she continues to have diffuse pain. Pt reports now having some blood in her urine     Triage Assessment       Row Name 09/14/23 1045       Skin Circulation/Temperature WDL    Skin Circulation/Temperature WDL X

## 2023-09-14 NOTE — DISCHARGE INSTRUCTIONS
ICD-10-CM    1. Acute post-operative pain  G89.18     I spooke with Dr. Ram.  his nursr will be calling to set-up follow-up.   return here for worsrening. continue home pain medications. return for fever. signs infection      2. Urge incontinence  N39.41 Adult Urology  Referral    I suspect this is related to your recent surgery and local irritation.  I can see fluid adjacent to the bladder that was seen on CT yesterday.  the bladder had only 120 cc urine - no signs of significant urinary retention.  recommend  detrol la starting with 2 mg daily (may increase to 4 mg if not effective after 1-2 days) as trial for the next few days until colorectal folloiw-up. perform kegal exercises multiple times daily.   see urology in follow-up

## 2023-09-14 NOTE — ED NOTES
In to discharge pt, pt is tearful, concerned she is not able to take care of self, reluctant to go home at this time. MD his signed out at this time, notified Nahed waite RN.

## 2023-09-14 NOTE — ED NOTES
"Patient is verbalizing frustration about being in a hallway bed in the ER. Writer explained that the ER was currently full but we will be getting her a room as soon as we can. Writer gave patient her evening pain medications, offered her food, and helped her to the bathroom. While walking to the bathroom, patient was complaining that RN \"wasn't doing anything for her\". RN asked what else I could do for her right now. Patient stated to RN \"You're just a condescending little bitch. Fuck you, I just want to leave\". RN clarified if the patient actually wanted to leave. She stated yes. RN walked away to grab the charge nurse and patient started hysterically screaming and crying in the hallway. Multiple RN's spoke with the patient and tried to calm her down, but patient decided to leave AMA. RN spoke to the patient about the risks of leaving AMA. She is A&Ox4. Vitally stable. Able to make her own decisions.    "

## 2023-09-14 NOTE — ED NOTES
Pt tearful regarding discharge. Reinforced discharge follow up, plan. [facility], [facility] referral. MD jensen updated.

## 2023-09-14 NOTE — TELEPHONE ENCOUNTER
Attempted to contact patient via phone one time at [phone] this morning after she left the emergency department yesterday evening (9/13) AMA; no response.    Lowell Bowman MD  General Surgery PGY-1  Colon and Rectal Surgery  Pager: [phone]

## 2023-09-14 NOTE — TELEPHONE ENCOUNTER
"Dr. Naidu:    Received a warm call transfer to speak to this patient by PCS, PCS says red flag nurse refused this call and told PCS to tell patient to call her surgeon, PCS messaged team and writer took the call from patient. Patient is hysterically crying on the phone, she says she has uncontrolled severe abdominal pain (right side more painful than left) that radiates to the side related to post op secondary to colo-rectal surgery. Patient says she went to the [facility] ER yesterday and reports that the staff left her in the hallway, did not feed her, told her she was suppose to be admitted, then after waiting several hours she states she asked the ED nurse to remove her IV which was not connected to any medication and left. Says that Oxycodone prescribed makes her anxious and angry so she does not take it, was so anxious at time of call that writer told her to take one tablet of her Hydroxyzine 25 mg which she did. Patient was advised several times to calm down and deep breath. During course of the call patient reports she is lying in urine on her bed as states she has uncontrollable urine draining out of her when she stands. Patient states she is not mentally able to handle this pain, she is not reporting suicide but is mentally breaking down from pain she says. She is alone now. Patient is advised to call 9-1-1, she says she is not able to and asks writer to call ambulance, writer called 9-1-1 and sent to patient's listed address. Called patient back to let her know that the ambulance is on the way, she asked writer to stay on phone and hung up when ambulance sirens were noted, she says her front door is open.  This is being routed to you as update only.     TEJINDER Rees        Answer Assessment - Initial Assessment Questions  1. LOCATION: \"Where does it hurt?\"       Patient reports abdominal pain that wraps around her entire abdominal area and to the sides  2. RADIATION: \"Does the pain shoot anywhere else?\" " "(e.g., chest, back)      Goes to her sides  3. ONSET: \"When did the pain begin?\" (e.g., minutes, hours or days ago)       Went in for post op pain at the [facility] yesterday, she has long history of colon issues and is planning on surgery for remainder of her colostomy take down in 15 weeks, patient had ulcerative colitis a week ago, has been crying and hysterical throughout the entire call, overwhelmed with post op pain and anxiety.  4. SUDDEN: \"Gradual or sudden onset?\"      gradual  5. PATTERN \"Does the pain come and go, or is it constant?\"     - If it comes and goes: \"How long does it last?\" \"Do you have pain now?\"      (Note: Comes and goes means the pain is intermittent. It goes away completely between bouts.)     - If constant: \"Is it getting better, staying the same, or getting worse?\"       (Note: Constant means the pain never goes away completely; most serious pain is constant and gets worse.)       constant  6. SEVERITY: \"How bad is the pain?\"  (e.g., Scale 1-10; mild, moderate, or severe)     - MILD (1-3): Doesn't interfere with normal activities, abdomen soft and not tender to touch.      - MODERATE (4-7): Interferes with normal activities or awakens from sleep, abdomen tender to touch.      - SEVERE (8-10): Excruciating pain, doubled over, unable to do any normal activities.        Severe 10/10, unable to stand  7. RECURRENT SYMPTOM: \"Have you ever had this type of stomach pain before?\" If Yes, ask: \"When was the last time?\" and \"What happened that time?\"       Patient has extensive colon issues, went in to ED yesterday but says she was waiting too long for a bed and asked the ED nurse to remove her IV and went home.  8. CAUSE: \"What do you think is causing the stomach pain?\"      Ulcerative colitis  9. RELIEVING/AGGRAVATING FACTORS: \"What makes it better or worse?\" (e.g., antacids, bending or twisting motion, bowel movement)      Movement, says that Oxycodone makes her angry and anxious, so she refuses " "to take it/.  10. OTHER SYMPTOMS: \"Do you have any other symptoms?\" (e.g., back pain, diarrhea, fever, urination pain, vomiting)        Patient states her urine just comes out of her when she stands, this started yesterday, feels sweaty, patient is too hysterical to speak  11. PREGNANCY: \"Is there any chance you are pregnant?\" \"When was your last menstrual period?\"        na    Protocols used: Abdominal Pain - Female-A-[place]    "

## 2023-09-14 NOTE — ED PROVIDER NOTES
History     Chief Complaint   Patient presents with    Post-op Problem     Pt was recently released from [facility] for colon surgery, pt had an ileostomy take down and another one placed. Pt reports she was having urinary incontinence and and leaking form her rectum. Pt was seen at the [facility], saw by her surgeon, was to be admitted. Pt left AMA after waiting 12 hours and no one had checked on her. Pt reports she continues to have diffuse pain. Pt reports now having some blood in her urine     HPI  Doretha Yu is a 48 year old female who presents with a history of anxiety intermittent asthma metastatic malignant melanoma diabetes ulcerative colitis with ostomy placements rheumatoid arthritis and immunosuppressed chronically.    Patient was in the emergency department at [facility] yesterday for evaluation post-op abdominal pain, and incontinence of urine and stool.  Followed undergoing on September 5, 2023 at the [facility].  She underwent a robotic proximal tach to me, ileostomy takedown and ileal J-pouch creation     transabdominal colectomy with an end ileostomy laparoscopically in 2021.  She had a secondary parastomal hernia and has had healing complications from her immune status including biologic agents for inflammatory arthritis and chronic prednisone.      She had arrived 9/13/2023 with feeling that everything was falling out of her including urine and stool.  She had previously had stress incontinence but now had large volume incontinence.  She had no fever.  No dysuria urgency frequency.  She denied having had a spinal anesthetic and there is no midline back pain.  She had seen some blood in the stool postprocedure but this was to be expected.  She did note that it was increased from what she had previously.  She also had significant pain.    She underwent extensive evaluation yesterday including a negative urinalysis negative urine pregnancy test normal lactate leukocytosis 17 2 and a  stable hemoglobin 10.8.  A CT of the abdomen pelvis was performed demonstrating no acute infection but could not exclude rectovaginal fistula.  Received several doses of Dilaudid.  Patient did arrive there by EMS.    Colorectal surgery was consulted and patient was accepted for admission by the colorectal surgery team.  The patient notes that she was in the hallway for about 11 hours without admission and ultimately left AMA at around 10 PM.  Overnight she continued to be quite nervous and upset about this.  Persistent pain continued incontinence urine and stool and arrives here by EMS.  There is been no other acute changes since her last evaluation.      Allergies:  Allergies   Allergen Reactions    Bee Venom Swelling    Azithromycin Diarrhea    Erythromycin      Other reaction(s): GI intolerance, Vomiting    Fentanyl Other (See Comments)     sweating  sweating    Prochlorperazine Fatigue     Other reaction(s): Other (see comments)  Fatigue    Buspirone      Other reaction(s): GI intolerance  vomiting    Erythrocin Nausea and Vomiting    Gluten Meal      Celiac disease    Topamax [Topiramate]      Made her lethargic    Zithromax [Azithromycin Dihydrate] Diarrhea    Enbrel [Etanercept] Hives and Rash       Problem List:    Patient Active Problem List    Diagnosis Date Noted    Post-op pain 09/13/2023     Priority: Medium    Gastroesophageal reflux disease without esophagitis 09/05/2023     Priority: Medium    Biliary dyskinesia 01/11/2023     Priority: Medium    Migraines      Priority: Medium    Colostomy in place (H) 07/16/2021     Priority: Medium    S/P colectomy 07/16/2021     Priority: Medium    Ulcerative colitis without complications, unspecified location (H) 06/03/2021     Priority: Medium    Dehydration 04/13/2021     Priority: Medium    Hematochezia 04/13/2021     Priority: Medium    Diarrhea of infectious origin 04/13/2021     Priority: Medium    C. difficile colitis 04/13/2021     Priority: Medium     Adjustment disorder with mixed emotional features 06/16/2020     Priority: Medium    Polyarthralgia 04/29/2020     Priority: Medium    Drug-induced polyneuropathy (H) 04/29/2020     Priority: Medium    Pain syndrome, chronic 02/12/2020     Priority: Medium    Drug-induced Cushing's syndrome (H) 02/12/2020     Priority: Medium    Diabetes mellitus, iatrogenic (H) 01/28/2020     Priority: Medium    High risk HPV infection 01/28/2020     Priority: Medium     Added automatically from request for surgery [id number]      Immunosuppression (H) 01/28/2020     Priority: Medium     Added automatically from request for surgery [id number]      STORMY (obstructive sleep apnea) 01/27/2020     Priority: Medium     1/2019 (241#)-AHI 24, lowest oxygen saturation was 86%, no periodic limb movement were noted, CPAP 8 cm/H20 was effective.      Secondary lymphedema 01/27/2020     Priority: Medium    Chronic neutrophilia 01/27/2020     Priority: Medium    Cervical high risk HPV (human papillomavirus) test positive 12/13/2019     Priority: Medium     2011 NIL pap.  2015 NIL pap, Neg HPV.  12/13/19 NIL pap , + HR HPV 16. Plan colp.   1/6/19 Failed colp exam secondary to anatomic constraints with gyn. Referred to gyn/onc.   2/25/20 Colpo bx and ECC Negative for dysplasia. Plan cotest in 1 year.   8/20/21 NIL pap, Neg HPV. Plan cotest in 1 year per provider.   9/28/22 Lost to follow-up for pap tracking       Immunosuppressed status (H) 09/05/2019     Priority: Medium    Ulcerative colitis with complication, unspecified location (H) 09/05/2019     Priority: Medium    Morbid obesity (H) 09/05/2019     Priority: Medium    Arthritis, rheumatoid (H) 11/26/2018     Priority: Medium    Hypocalcemia 05/15/2018     Priority: Medium    Anemia 05/14/2018     Priority: Medium    Menstrual irregularity 05/14/2018     Priority: Medium    Arthralgia of both knees 05/12/2018     Priority: Medium     Formatting of this note might be different from the  original.  Work-up in progress. There is concern for inflammatory arthritis.      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     Priority: Medium    Malignant melanoma of left upper extremity including shoulder (H) 10/12/2017     Priority: Medium    Metastatic malignant melanoma (H) 10/10/2017     Priority: Medium    Prothrombin mutation (H) 04/05/2017     Priority: Medium     On daily aspirin 81 mg per hematology's recommendations from 2008      Vision changes 04/01/2017     Priority: Medium    Mild intermittent asthma without complication 11/08/2013     Priority: Medium    Moderate major depression (H) 06/24/2013     Priority: Medium    Anxiety state 09/13/2012     Priority: Medium     Problem list name updated by automated process. Provider to review      Esophageal reflux 09/13/2012     Priority: Medium    DUB (dysfunctional uterine bleeding) 07/28/2011     Priority: Medium    CARDIOVASCULAR SCREENING; LDL GOAL LESS THAN 160 07/28/2011     Priority: Low        Past Medical History:    Past Medical History:   Diagnosis Date    Abnormal MRI     Anxiety     Basal cell carcinoma     Cervical high risk HPV (human papillomavirus) test positive 12/13/2019    Colitis     Depression     Diabetes mellitus, iatrogenic (H) 01/28/2020    Esophageal reflux     Inflammatory arthritis     Insomnia     Intestinal giardiasis 03/05/2018    Lumbago     Lymphedema     Malignant melanoma (H)     Melanoma (H)  10/23/2017    Migraines     Mild persistent asthma     Morbid obesity with BMI of 40.0-44.9, adult (H)     STORMY (obstructive sleep apnea)     Prothrombin deficiency (H)     Stroke (cerebrum) (H)     TIA (transient ischemic attack)     Type 2 diabetes mellitus (H)     Ulcerative pancolitis (H)        Past Surgical History:    Past Surgical History:   Procedure Laterality Date    APPENDECTOMY  2004    COLONOSCOPY N/A 10/18/2017    Procedure: COLONOSCOPY;  Colon;  Surgeon: Debbie Stephens MD;  Location: [facility]    COLONOSCOPY N/A 2018    Procedure: COMBINED COLONOSCOPY, SINGLE OR MULTIPLE BIOPSY/POLYPECTOMY BY BIOPSY;  colon;  Surgeon: Benita Schumacher MD;  Location: [facility]    COLONOSCOPY      multiple since  to present - about 6 total    DAVINCI ASSISTED TRANSANAL TOTAL MESORECTAL EXCISION N/A 2023    Procedure: COMPLETION PROCTECTOMY, ROBOT-ASSISTED, ILEAL POUCH ANASTAMOSIS;  Surgeon: Quinton Ram MD;  Location: [facility] OR    DISSECT LYMPH NODE AXILLA Left 10/23/2017    Procedure: DISSECT LYMPH NODE AXILLA;  Left Axillary Lymph Node Dissection ;  Surgeon: Laurent Cool MD;  Location: [facility]    EXAM UNDER ANESTHESIA PELVIC N/A 2020    Procedure: EXAM UNDER ANESTHESIA, PELVIS; with Cervical Biopsies, Vaginal Biopsy and Endocervical Curettings;  Surgeon: Melina Jung MD;  Location: [facility] OR    GYN SURGERY  ,         LAPAROSCOPIC ASSISTED COLECTOMY N/A 06/15/2021    Procedure: laparoscopic total abdominal colectomy, end ileostomy;  Surgeon: Quinton Ram MD;  Location: [facility] OR    REPAIR MOHS Left 2017    Procedure: REPAIR MOHS;  Left Upper Lid Moh's Reconstruction;  Surgeon: Kisha Bosch MD;  Location: [facility]    SIGMOIDOSCOPY FLEXIBLE N/A 2023    Procedure: Sigmoidoscopy flexible;  Surgeon: Quinton Ram MD;  Location: [facility] OR       Family History:    Family History   Problem Relation Age of Onset    Cancer Mother 45        lung     Neurologic Disorder Mother         epilepsy    Lipids Father     Gastrointestinal Disease Father         diverticulitis     Depression Father     Colitis Father     Colon Cancer Father     Diverticulitis Father     Depression Sister     Cancer Maternal Grandmother     Blood Disease Maternal Grandmother         lymphoma     Arthritis Maternal Grandmother     Diabetes Maternal Grandmother     Depression Maternal Grandmother     Macular Degeneration Maternal Grandmother     Glaucoma Maternal Grandmother     Diabetes Maternal Grandfather     Cerebrovascular Disease Maternal Grandfather     Blood Disease Maternal Grandfather     Heart Disease Maternal Grandfather     Glaucoma Maternal Grandfather     Cancer Paternal Grandmother     Cancer - colorectal Paternal Grandmother     Colitis Paternal Grandmother     Colon Cancer Paternal Grandmother     Diverticulitis Paternal Grandmother     Respiratory Paternal Grandfather         emphysema     Colitis Paternal Grandfather     Colon Cancer Paternal Grandfather     Diverticulitis Paternal Grandfather     Heart Disease Daughter     Asthma Daughter     Melanoma No family hx of     Anesthesia Reaction No family hx of     Clotting Disorder No family hx of        Social History:  Marital Status:   [4]  Social History     Tobacco Use    Smoking status: Former     Packs/day: 1.00     Years: 5.00     Pack years: 5.00     Types: Cigarettes     Quit date: 3/20/1998     Years since quittin.5    Smokeless tobacco: Never   Vaping Use    Vaping Use: Never used   Substance Use Topics    Alcohol use: Not Currently    Drug use: Yes     Types: Marijuana     Comment: vape, edible        Medications:    acetaminophen (TYLENOL) 325 MG tablet  albuterol (PROAIR HFA/PROVENTIL HFA/VENTOLIN HFA) 108 (90 Base) MCG/ACT inhaler  famotidine (PEPCID) 20 MG tablet  hydrOXYzine (ATARAX) 25 MG tablet  medical cannabis (Patient's own supply)  menthol (ICY HOT) 5 % PTCH  metFORMIN (GLUCOPHAGE) 500  MG tablet  methocarbamol (ROBAXIN) 750 MG tablet  ondansetron (ZOFRAN ODT) 4 MG ODT tab  Ostomy Supplies MISC  oxyCODONE (ROXICODONE) 5 MG tablet  oxyCODONE IR (ROXICODONE) 5 MG tablet  phentermine (ADIPEX-P) 15 MG capsule  venlafaxine (EFFEXOR) 100 MG tablet  venlafaxine (EFFEXOR) 50 MG tablet          Review of Systems   Constitutional:  Negative for chills, diaphoresis and fever.   HENT:  Negative for ear pain, sinus pressure and sore throat.    Eyes:  Negative for visual disturbance.   Respiratory:  Negative for cough, shortness of breath and wheezing.    Cardiovascular:  Negative for chest pain and palpitations.   Gastrointestinal:  Positive for abdominal pain, blood in stool and diarrhea. Negative for constipation, nausea and vomiting.   Genitourinary:  Positive for enuresis. Negative for dysuria, frequency and urgency.   Skin:  Negative for rash.   Neurological:  Negative for headaches.   All other systems reviewed and are negative.      Physical Exam          Physical Exam  Constitutional:       General: She is in acute distress.      Appearance: She is not diaphoretic.   HENT:      Head: Atraumatic.   Eyes:      Conjunctiva/sclera: Conjunctivae normal.   Cardiovascular:      Rate and Rhythm: Normal rate and regular rhythm.      Heart sounds: No murmur heard.  Pulmonary:      Effort: Pulmonary effort is normal. No respiratory distress.      Breath sounds: Normal breath sounds. No stridor. No wheezing or rhonchi.   Abdominal:      General: There is no distension.      Palpations: There is no mass.      Tenderness: There is abdominal tenderness. There is no guarding.      Hernia: No hernia is present.   Musculoskeletal:      Cervical back: Neck supple.      Right lower leg: No edema.      Left lower leg: No edema.   Skin:     Coloration: Skin is not pale.      Findings: No rash.   Neurological:      General: No focal deficit present.      Mental Status: She is alert.      Motor: No weakness.         ED Course                  Procedures              Critical Care time:  none               Results for orders placed or performed during the hospital encounter of 09/13/23 (from the past 24 hour(s))   Blood Culture Peripheral Blood    Specimen: Peripheral Blood   Result Value Ref Range    Culture No growth after 12 hours    iStat Gases Electrolytes ICA Glucose Venous, POCT   Result Value Ref Range    CPB Applied No     Hematocrit POCT 32 (L) 35 - 47 %    Calcium, Ionized Whole Blood POCT 5.1 4.4 - 5.2 mg/dL    Glucose Whole Blood POCT 86 70 - 99 mg/dL    Bicarbonate Venous POCT 29 (H) 21 - 28 mmol/L    Hemoglobin POCT 10.9 (L) 11.7 - 15.7 g/dL    Potassium POCT 3.9 3.4 - 5.3 mmol/L    Sodium POCT 138 133 - 144 mmol/L    pCO2 Venous POCT 44 40 - 50 mm Hg    pO2 Venous POCT 20 (L) 25 - 47 mm Hg    pH Venous POCT 7.42 7.32 - 7.43    O2 Sat, Venous POCT 32 (L) 94 - 100 %   iStat Gases (lactate) venous, POCT   Result Value Ref Range    Lactic Acid POCT 1.0 <=2.0 mmol/L    Bicarbonate Venous POCT 30 (H) 21 - 28 mmol/L    O2 Sat, Venous POCT 28 (L) 94 - 100 %    pCO2 Venous POCT 46 40 - 50 mm Hg    pH Venous POCT 7.42 7.32 - 7.43    pO2 Venous POCT 19 (L) 25 - 47 mm Hg   CBC with platelets differential    Narrative    The following orders were created for panel order CBC with platelets differential.  Procedure                               Abnormality         Status                     ---------                               -----------         ------                     CBC with platelets and d...[[id number]]  Abnormal            Final result                 Please view results for these tests on the individual orders.   Comprehensive metabolic panel   Result Value Ref Range    Sodium 138 136 - 145 mmol/L    Potassium 4.0 3.4 - 5.3 mmol/L    Chloride 100 98 - 107 mmol/L    Carbon Dioxide (CO2) 26 22 - 29 mmol/L    Anion Gap 12 7 - 15 mmol/L    Urea Nitrogen 8.1 6.0 - 20.0 mg/dL    Creatinine 0.83 0.51 - 0.95 mg/dL    Calcium 10.0 8.6  - 10.0 mg/dL    Glucose 87 70 - 99 mg/dL    Alkaline Phosphatase 138 (H) 35 - 104 U/L    AST 26 0 - 45 U/L    ALT 22 0 - 50 U/L    Protein Total 7.0 6.4 - 8.3 g/dL    Albumin 3.7 3.5 - 5.2 g/dL    Bilirubin Total 0.2 <=1.2 mg/dL    GFR Estimate 87 >60 mL/min/1.73m2   INR   Result Value Ref Range    INR 0.99 0.85 - 1.15   Partial thromboplastin time   Result Value Ref Range    aPTT 26 22 - 38 Seconds   Magnesium   Result Value Ref Range    Magnesium 1.9 1.7 - 2.3 mg/dL   CBC with platelets and differential   Result Value Ref Range    WBC Count 17.2 (H) 4.0 - 11.0 10e3/uL    RBC Count 3.98 3.80 - 5.20 10e6/uL    Hemoglobin 10.8 (L) 11.7 - 15.7 g/dL    Hematocrit 33.2 (L) 35.0 - 47.0 %    MCV 83 78 - 100 fL    MCH 27.1 26.5 - 33.0 pg    MCHC 32.5 31.5 - 36.5 g/dL    RDW 14.6 10.0 - 15.0 %    Platelet Count 461 (H) 150 - 450 10e3/uL    % Neutrophils 77 %    % Lymphocytes 14 %    % Monocytes 5 %    % Eosinophils 3 %    % Basophils 0 %    % Immature Granulocytes 1 %    NRBCs per 100 WBC 0 <1 /100    Absolute Neutrophils 13.4 (H) 1.6 - 8.3 10e3/uL    Absolute Lymphocytes 2.3 0.8 - 5.3 10e3/uL    Absolute Monocytes 0.8 0.0 - 1.3 10e3/uL    Absolute Eosinophils 0.5 0.0 - 0.7 10e3/uL    Absolute Basophils 0.1 0.0 - 0.2 10e3/uL    Absolute Immature Granulocytes 0.2 <=0.4 10e3/uL    Absolute NRBCs 0.0 10e3/uL   Lactic acid whole blood   Result Value Ref Range    Lactic Acid 1.1 0.7 - 2.0 mmol/L   Blood Culture Peripheral Blood    Specimen: Peripheral Blood   Result Value Ref Range    Culture No growth after 12 hours    CT Abdomen Pelvis w Contrast    Narrative    EXAMINATION: CT ABDOMEN PELVIS W CONTRAST, 9/13/2023 3:51 PM    INDICATION: s/p prostatectomy and ileostomy placement. New  stool/urinary incontinence mixed with blood.  Concern for rectovaginal  fistula?    COMPARISON STUDY: 3/29/2023    TECHNIQUE: CT scan of the abdomen and pelvis was performed on  multidetector CT scanner using volumetric acquisition technique  and  images were reconstructed in multiple planes with variable thickness  and reviewed on dedicated workstations.     CONTRAST: iopamidol (ISOVUE-370) solution 127 mL IV without oral  contrast    CT scan radiation dose is optimized to minimum requisite dose using  automated dose modulation techniques.    FINDINGS:    Lower thorax: Bibasilar dependent consolidative opacities, likely  atelectasis.    Liver: No mass. No intrahepatic biliary ductal dilation.    Biliary System: Normal gallbladder. No extrahepatic biliary ductal  dilation.    Pancreas: Atrophic pancreas.    Adrenal glands: No mass or nodules    Spleen: Normal.    Kidneys: No suspicious mass, obstructing calculus or hydronephrosis.    Gastrointestinal tract: Postoperative changes of completion  proctectomy, takedown of ileostomy, and creation of ileal J-pouch with  ileoanal anastomosis and right lower quadrant diverting ileostomy.   Air and fluid distended loops of small bowel in the anterior left mid  abdomen without focal transition point, likely reflecting slow  transit. The distal bowel abuts the vaginal canal within the pelvis  (series 4, image 179) without definite communication to suggest  fistula tract.    Pelvis: Tiny foci of air in the urinary bladder, presumed due to prior  catheterization.    Mesentery/peritoneum/retroperitoneum: Small foci of pneumoperitoneum  in the right lower quadrant and under the right hemidiaphragm, likely  postoperative. Edematous mesenteric fat stranding in the low abdomen  extending into the pelvis. Small volume free fluid in pelvis.    Lymph nodes: No significant lymphadenopathy.    Vasculature: No aneurysm of the abdominal aorta.     Soft tissues: Right lower quadrant diverting ileostomy changes.  Postoperative edema, fat stranding, soft tissue thickening, and a tiny  focus of air at the site of the prior ileostomy takedown in the right  upper quadrant abdominal wall.     Osseous structures: No aggressive or acute  osseous lesion.      Impression    IMPRESSION:   1.  Postoperative changes of completion proctectomy, ileostomy  takedown, and creation of ileal J-pouch with ileoanal pouch anal  anastomosis and right lower quadrant diverting ileostomy. The distal  bowel at the level of the anastomosis abuts the vaginal canal within  the pelvis without definite communication to suggest fistula tract.  Tiny foci of pneumoperitoneum in the right lower quadrant and under  the right hemidiaphragm, edema in the lower abdomen, and small volume  free fluid in the pelvis, likely postoperative.  2.  Bibasilar dependent consolidative opacities in the lungs, likely  atelectasis.    I have personally reviewed the examination and initial interpretation  and I agree with the findings.    KEESHA WALKER,          SYSTEM ID:  [id number]   HCG qualitative urine   Result Value Ref Range    hCG Urine Qualitative Negative Negative   UA with Microscopic reflex to Culture    Specimen: Urine, Clean Catch   Result Value Ref Range    Color Urine Light Yellow Colorless, Straw, Light Yellow, Yellow    Appearance Urine Clear Clear    Glucose Urine Negative Negative mg/dL    Bilirubin Urine Negative Negative    Ketones Urine Negative Negative mg/dL    Specific Gravity Urine 1.018 1.003 - 1.035    Blood Urine Trace (A) Negative    pH Urine 7.0 5.0 - 7.0    Protein Albumin Urine Negative Negative mg/dL    Urobilinogen Urine Normal Normal, 2.0 mg/dL    Nitrite Urine Negative Negative    Leukocyte Esterase Urine Negative Negative    Bacteria Urine Few (A) None Seen /HPF    Mucus Urine Present (A) None Seen /LPF    Amorphous Crystals Urine Few (A) None Seen /HPF    RBC Urine 0 <=2 /HPF    WBC Urine <1 <=5 /HPF    Squamous Epithelials Urine <1 <=1 /HPF    Hyaline Casts Urine 4 (H) <=2 /LPF    Narrative    Urine Culture not indicated   Asymptomatic COVID-19 Virus (Coronavirus) by PCR Nasopharyngeal    Specimen: Nasopharyngeal; Swab   Result Value Ref Range    SARS  CoV2 PCR Negative Negative    Narrative    Testing was performed using the Xpert Xpress SARS-CoV-2 Assay on the Cep[employer] Gene-Xpert Instrument Systems. Additional information about this Emergency Use Authorization (EUA) assay can be found via the Lab Guide. This test should be ordered for the detection of SARS-CoV-2 in individuals who meet SARS-CoV-2 clinical and/or epidemiological criteria as well as from individuals without symptoms or other reasons to suspect COVID-19. Test performance for asymptomatic patients has only been established in anterior nasal swab specimens. This test is for in vitro diagnostic use under the FDA EUA for laboratories certified under CLIA to perform high complexity testing. This test has not been FDA cleared or approved. A negative result does not rule out the presence of PCR inhibitors in the specimen or target RNA concentration below the limit of detection for the assay. The possibility of a false negative should be considered if the patient's recent exposure or clinical presentation suggests COVID-19. This test was validated by [facility]. These Laboratories are certified under the Clinical Laboratory Improvement Amendments (CLIA) as qualified to perform high complexity testing.       *Note: Due to a large number of results and/or encounters for the requested time period, some results have not been displayed. A complete set of results can be found in Results Review.       Medications   LORazepam (ATIVAN) injection 1 mg (1 mg Intravenous $Given 9/14/23 1127)       Assessments & Plan (with Medical Decision Making)     MDM: Doretha Yu is a 48 year old female presenting here with a history of ulcerative colitis related colectomy with revision done at the [facility] September 5 J-pouch creation.  Complicated outpatient by new onset urinary incontinence as well as stool incontinence.  Abdominal pain.  Concerns for possible rectovaginal fistula.  Plan for  admission but left the [facility] yesterday AMA arrives here because of persistent symptoms.  Quite anxious on arrival.  Plan for discussion with colorectal surgery [facility] to establish a plan.  Repeat CBC comprehensive panel but yesterday CT of the abdomen pelvis was completed as well as urinalysis and other testing.    Patient was quite anxious on arrival and given IV Ativan and rested comfortably for more than 2-3 hours    I did speak with patient's surgeon Dr. Ram.  At this point there is no serious cause for her abdominal pain.  Her white count is decreased.  She is afebrile.  Benign examination.  She was quite anxious on arrival and given dose of Ativan slept for hours.  She has not slept well last night.  he will be having his team set-up follow-up.     I suspected that she may have the urge incontinence with larger volumes of urine out with minimal symptoms of an urge to urinate and no warning.  This had onset after surgery and I suspect there is likely an inflammatory component postoperatively that is resulting in the sensation.  She has a bedside ultrasound today that shows 120 cc of urine in the bladder after being in the department for several hours.  She had not yet urinated again.  Given no obvious urinary retention no known cognitive deficits I recommended that we trial with Kegel exercises and Detrol LA 2 to 4 mg and follow-up with urology.  Suspect this is urge incontinence.  Discussed this with the patient.    I did also spend considerable time bedside speaking to her about the overall plan and placed discharge orders.  After I completed my shift.  I was called by charge nurse as the patient did not wish to leave.  Further request from the patient were to obtain home health.  She is arrived by EMS is primarily homebound currently.  She has difficulty with ambulation he does not have reliable transport from her referrals home to medical care.  She has multiple  acute new symptoms  from her complicated surgery and home care is appropriate for her.  I have placed the consult.      I have given her recommendations for return    I have reviewed the nursing notes.    I have reviewed the findings, diagnosis, plan and need for follow up with the patient.           Medical Decision Making  The patient's presentation was of high complexity (a chronic illness severe exacerbation, progression, or side effect of treatment).    The patient's evaluation involved:  review of external note(s) from 1 sources (2 notes - last hospital stay, ed visit yesterday)  review of 3+ test result(s) ordered prior to this encounter (labs from yesterday)  ordering and/or review of 3+ test(s) in this encounter (see separate area of note for details)  discussion of management or test interpretation with another health professional (Dr. Ram)    The patient's management necessitated moderate risk (prescription drug management including medications given in the ED), high risk (a parenteral controlled substance), and high risk (a decision regarding hospitalization).        New Prescriptions    No medications on file       Final diagnoses:   Acute post-operative pain - I spooke with Dr. Ram.  his nursr will be calling to set-up follow-up.   return here for worsrening. continue home pain medications. return for fever. signs infection   Urge incontinence - I suspect this is related to your recent surgery and local irritation.  I can see fluid adjacent to the bladder that was seen on CT yesterday.  the bladder had only 120 cc urine - no signs of significant urinary retention.  recommend  detrol la starting with 2 mg daily (may increase to 4 mg if not effective after 1-2 days) as trial for the next few days until colorectal folloiw-up. perform kegal exercises multiple times daily.   see urology in follow-up   Ulcerative colitis with complication, unspecified location (H)   S/P colectomy        9/14/2023   [facility] EMERGENCY DEPT       Cezar Bryan MD  09/14/23 [id number]

## 2023-09-14 NOTE — PROGRESS NOTES
I was notified about patient wanting to leave AMA. I reached the ED to talk to the patient at 8:35 PM but patient had already left the building 5 min ago. Dr Mckeon/Dr Hernandez notified. The colorectal team will attempt to contact her in Am tomorrow.    Consuelo MCCLAINBS  PGY1 General Surgery  [facility]  Surgery Cross Cover

## 2023-09-14 NOTE — ED NOTES
"Pt up to edge of bed, pt is tearful, states \"its leaking\". Pt ileostomy bag leaking. Removed old, cleaned and replaced new. Pt reports she needs her dressing changed as well and needs to be packed. Removed dressing on abdomen, cleansed wound, packed and applied new dressing. Pt concerned about equipment being used, asked if we have the putty for the ostomy, informed pt we are only provided with limited supplies here in the ED. Call placed to WO, no answer, not in the office today.   "

## 2023-09-15 ENCOUNTER — NURSE TRIAGE (OUTPATIENT)
Dept: FAMILY MEDICINE | Facility: CLINIC | Age: 48
End: 2023-09-15
Payer: COMMERCIAL

## 2023-09-15 ENCOUNTER — PATIENT OUTREACH (OUTPATIENT)
Dept: CARE COORDINATION | Facility: CLINIC | Age: 48
End: 2023-09-15
Payer: COMMERCIAL

## 2023-09-15 ENCOUNTER — HOSPITAL ENCOUNTER (OUTPATIENT)
Dept: WOUND CARE | Facility: CLINIC | Age: 48
Discharge: HOME | End: 2023-09-15
Attending: FAMILY MEDICINE | Admitting: FAMILY MEDICINE
Payer: COMMERCIAL

## 2023-09-15 ENCOUNTER — PATIENT OUTREACH (OUTPATIENT)
Dept: SURGERY | Facility: CLINIC | Age: 48
End: 2023-09-15
Payer: COMMERCIAL

## 2023-09-15 DIAGNOSIS — K92.9 GASTROINTESTINAL DISORDER: Primary | ICD-10-CM

## 2023-09-15 PROCEDURE — G0463 HOSPITAL OUTPT CLINIC VISIT: HCPCS

## 2023-09-15 ASSESSMENT — ACTIVITIES OF DAILY LIVING (ADL): DEPENDENT_IADLS:: INDEPENDENT

## 2023-09-15 NOTE — LETTER
[facility] CARE COORDINATION  [address]  [place] [address]     September 15, 2023    Doretha Yu  [address]  [place] [address]      Dear Doretha,        I am a  clinic care coordinator who works with Anna Naidu MD with the [facility]. I wanted to thank you for spending the time to talk with me.  Below is a description of clinic care coordination and how I can further assist you.       The clinic care coordination team is made up of a registered nurse, , financial resource worker and community health worker who understand the health care system. The goal of clinic care coordination is to help you manage your health and improve access to the health care system. Our team works alongside your provider to assist you in determining your health and social needs. We can help you obtain health care and community resources, providing you with necessary information and education. We can work with you through any barriers and develop a care plan that helps coordinate and strengthen the communication between you and your care team.  Our services are voluntary and are offered without charge to you personally.    Please feel free to contact me with any questions or concerns regarding care coordination and what we can offer.      We are focused on providing you with the highest-quality healthcare experience possible.    Sincerely,     [facility]   Gisel Marte RN, Care Coordinator   [facility]'s   E-mail [email or web address]   [phone]     Enclosed: I have enclosed a copy of the Patient Centered Plan of Care. This has helpful information and goals that we have talked about. Please keep this in an easy to access place to use as needed.

## 2023-09-15 NOTE — LETTER
[facility]  Patient Centered Plan of Care  About Me:        Patient Name:  Doretha Yu    YOB: 1975  Age:         48 year old   [facility] MRN:    [id number] Telephone Information:  Home Phone [phone]   Mobile [phone]       Address:  [address] [address] Email address:  [email or web address]      Emergency Contact(s)    Name Relationship Lgl Grd Work Phone Home Phone Mobile Phone   1. JUDY GRANT Stepparent No [phone]   2. ROSETTE YU Father No  [phone]   3. OBED MONTANEZ Sister No   [phone]           Primary language:  English     needed? No   [employer]:  [phone] op. 1  Other communication barriers:-- (anxiety)    Preferred Method of Communication:  Mail  Current living arrangement: I live in a private home with family    Mobility Status/ Medical Equipment: Independent        Health Maintenance  Health Maintenance Reviewed:   Health Maintenance Due   Topic Date Due    DIABETIC FOOT EXAM  Never done    Pneumococcal Vaccine: Pediatrics (0 to 5 Years) and At-Risk Patients (6 to 64 Years) (1 - PCV) Never done    YEARLY PREVENTIVE VISIT  03/02/2016    EYE EXAM  10/12/2018    ASTHMA ACTION PLAN  01/28/2020    COVID-19 Vaccine (4 - Pfizer risk series) 10/25/2021    DTAP/TDAP/TD IMMUNIZATION (7 - Td or Tdap) 04/09/2022    PAP FOLLOW-UP  08/20/2022    HPV FOLLOW-UP  08/20/2022    URINE DRUG SCREEN  05/18/2023    MAMMO SCREENING  05/31/2023    INFLUENZA VACCINE (1) 09/01/2023          My Access Plan  Medical Emergency 911   Primary Clinic Line [facility] - [phone]   24 Hour Appointment Line [phone] or  [phone]) (toll-free)   24 Hour Nurse Line [phone] (toll-free)   Preferred Urgent Care No data recorded   Preferred Hospital [place], [place]  [phone]     Preferred Pharmacy [facility]  [place], [place] - [address]     Behavioral Health Crisis Line The National Suicide Prevention Lifeline at 1-518.119.1571 or Text/Call 988         My Care Team Members  Patient Care Team         Relationship Specialty Notifications Start End    Anna Naidu MD PCP - General Family Practice  2/25/20     Phone: [phone] Pager: [phone] Fax: [phone]        [address] [facility] [address]    Cheo Norton MD MD Ophthalmology  8/24/17     Phone: [phone] Fax: [phone]         [address] [facility] [address]    Kathy Richards MD MD Hematology & Oncology Abnormal results only, Admissions 11/17/17     Phone: [phone] Fax: [phone]         [address] [place] [address]    Antonia Nair MD MD Radiation Oncology  3/20/18     Phone: [phone] Fax: [phone] [address] [address]  [address] [facility] [address]    Laura Rene, RN Diabetes Educator Diabetes Education  2/7/20     Phone: [phone]         Anna Naidu MD Assigned PCP   2/21/21     Phone: [phone] Pager: [phone] Fax: [phone]        [address] [facility] [address]    Cary Castellanos MD MD Endocrinology, Diabetes, and Metabolism  2/9/22     Phone: [phone] Pager: [phone] Fax: [phone]        [address] [facility] [place] [facility] [address]    Cary Castellanos MD Assigned Endocrinology Provider   2/20/22     Phone: [phone] Pager: [phone] Fax: [phone]        [address] [facility] [address]    Quinton Ram MD Assigned Surgical Provider   5/6/23     Phone: [phone] Pager: [phone] Fax: [phone]        [address] [place] [address]    Gisel Marte, RN Lead Care Coordinator Primary Care - [facility] Admissions 9/15/23     Phone: [phone]                   My Care Plans  Self Management and Treatment Plan  Care Plan  Care Plan: General       Problem: HP GENERAL PROBLEM        Goal: I will have a good plan for wound care in the next week       Start Date: 9/15/2023 Expected End Date: 9/22/2023    This Visit's Progress: 0%    Note:     Barriers: Anxiety  Unable to find home care services   Strengths: lives with daughter   Patient expressed understanding of goal: Yes   Action steps to achieve this goal:  1. I will have a Community Paramedic visit   2. Care coordinator will call surgeons office and ostomy RN for supplies                                  Action Plans on File:                       Advance Care Plans/Directives Type:   Advanced Directive - On File      My Medical and Care Information  Problem List   Patient Active Problem List   Diagnosis    CARDIOVASCULAR SCREENING; LDL GOAL LESS THAN 160    DUB (dysfunctional uterine bleeding)    Anxiety state    Esophageal reflux    Moderate major depression (H)    Mild intermittent asthma without complication    Vision changes    Prothrombin mutation (H)    Metastatic malignant melanoma (H)    Malignant melanoma of left upper extremity including shoulder (H)    Functional diarrhea    Colitis    Rectal bleeding    Bilateral leg cramps    Rash and nonspecific skin eruption    Other chronic pain    Immunosuppressed status (H)    Ulcerative colitis with complication, unspecified location (H)    Morbid obesity (H)    Cervical high risk HPV (human papillomavirus) test positive    STORMY (obstructive sleep apnea)    Secondary lymphedema    Chronic neutrophilia    Diabetes mellitus, iatrogenic (H)    High risk HPV infection    Abdominal pain    Anemia    Candidiasis of mouth    Hypocalcemia    Malaise    Menstrual irregularity    Arthritis, rheumatoid (H)    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 difficile colitis    Ulcerative colitis without  complications, unspecified location (H)    Colostomy in place (H)    S/P colectomy    Polyarthralgia    Drug-induced polyneuropathy (H)    Arthralgia of both knees    Adjustment disorder with mixed emotional features    Migraines    Biliary dyskinesia    Gastroesophageal reflux disease without esophagitis    Post-op pain      Current Medications and Allergies:    Current Outpatient Medications   Medication    acetaminophen (TYLENOL) 325 MG tablet    albuterol (PROAIR HFA/PROVENTIL HFA/VENTOLIN HFA) 108 (90 Base) MCG/ACT inhaler    famotidine (PEPCID) 20 MG tablet    hydrOXYzine (ATARAX) 25 MG tablet    medical cannabis (Patient's own supply)    menthol (ICY HOT) 5 % PTCH    metFORMIN (GLUCOPHAGE) 500 MG tablet    methocarbamol (ROBAXIN) 750 MG tablet    ondansetron (ZOFRAN ODT) 4 MG ODT tab    Ostomy Supplies MISC    oxyCODONE (ROXICODONE) 5 MG tablet    oxyCODONE IR (ROXICODONE) 5 MG tablet    phentermine (ADIPEX-P) 15 MG capsule    tolterodine ER (DETROL LA) 2 MG 24 hr capsule    venlafaxine (EFFEXOR) 100 MG tablet    venlafaxine (EFFEXOR) 50 MG tablet     No current facility-administered medications for this visit.     Facility-Administered Medications Ordered in Other Visits   Medication    lidocaine 1 % 9 mL    sodium bicarbonate 8.4 % injection 1 mEq        Care Coordination Start Date: 9/15/2023   Frequency of Care Coordination: weekly     Form Last Updated: 09/15/2023

## 2023-09-15 NOTE — LETTER
PRIMARY LOCATION: [facility]   CSN:[id number]      PATIENT DEMOGRAPHICS:   Name: Doretha Yu MRN: [id number]   Address: [address] Sex: Female   City/St/Zip: [place] [address] : 1975   Home Phone: [phone] Work Phone: [phone]   County: [facility] Cell Phone: [phone]       EMERGENCY CONTACT:  Contact Name: Eunice Falk Relationship: Stepparent   Home Phone: [phone] Work Phone: [phone]       Cell Phone: [phone]      GUARANTOR INFORMATION: Relationship to Patient: Self  Name: Doretha Yu       Address: [address] [place] [address] St  Account Type: Personal/family   City/St/[place] [address] Employer: [employer]   Home Phone: [phone] Work Phone:          COVERAGE INFORMATION:  Primary Payor: [employer] Plan: [employer] [facility]   Subscriber: Doretha Yu Group #: [id number]   Subscriber Sex: Female       Subscriber : 1975 Patient Rel'ship: Self   Subscriber Effective Date:   Member Effective Date: 2022    Subscriber ID [id number] Member ID: [id number]      Secondary Payor: [employer] Plan: [employer]   Subscriber: Doretha Yu Group #: [id number]   Subscriber Sex: Female       Subscriber : 1975 Patient Rel'ship: Self   Subscriber Effective Date:   Member Effective Date: 2022   Subscriber ID [id number] Member ID: [id number]      PROVIDER INFORMATION:  Referring Physician:   Referring Address:     Referring Phone: N/A Referring Fax:     Primary Physician: Anna Naidu Primary Address: [address] [facility] [address]   Primary Phone: [phone] Primary Fax: [phone]      Documentation of Face to Face and Certification for Home Health Services     I attest that I saw or will see Doretha Yu on this date:  2023     This encounter with the patient was in whole, or in part, for medical condition, which is the primary reason for Home Health Care:       Patient Active Problem List   Diagnosis    CARDIOVASCULAR  SCREENING; LDL GOAL LESS THAN 160    DUB (dysfunctional uterine bleeding)    Anxiety state    Esophageal reflux    Moderate major depression (H)    Mild intermittent asthma without complication    Vision changes    Prothrombin mutation (H)    Metastatic malignant melanoma (H)    Malignant melanoma of left upper extremity including shoulder (H)    Functional diarrhea    Colitis    Rectal bleeding    Bilateral leg cramps    Rash and nonspecific skin eruption    Other chronic pain    Immunosuppressed status (H)    Ulcerative colitis with complication, unspecified location (H)    Morbid obesity (H)    Cervical high risk HPV (human papillomavirus) test positive    STORMY (obstructive sleep apnea)    Secondary lymphedema    Chronic neutrophilia    Diabetes mellitus, iatrogenic (H)    High risk HPV infection    Abdominal pain    Anemia    Candidiasis of mouth    Hypocalcemia    Malaise    Menstrual irregularity    Arthritis, rheumatoid (H)    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 difficile colitis    Ulcerative colitis without complications, unspecified location (H)    Colostomy in place (H)    S/P colectomy    Polyarthralgia    Drug-induced polyneuropathy (H)    Arthralgia of both knees    Adjustment disorder with mixed emotional features    Migraines    Biliary dyskinesia    Gastroesophageal reflux disease without esophagitis    Post-op pain      I certify that, based on my findings, the following services are medically necessary Home Health Services: Skilled Nursing Wound assessment and care       Additional services needed:        Further, I certify that my clinical findings support that this patient is homebound (i.e. absences from home require considerable and taxing effort and are for medical reasons or Oriental orthodox  services or infrequently or of short duration when for other reasons) related to:Patient has difficulty ambulating >100 ft     Based on the above findings, I certify that this patient is confined to the home and needs intermittent skilled nursing care, physical therapy and/or speech therapy. The patient is under my care, and I have initiated the establishment of the plan of care. This patient will be followed by a physician who will periodically review the plan of care.        Physician/Provider to provide follow up care: Provider to follow patient: LACHELLEJOLANTA [[id number]]        Please be aware that coverage of these services is subject to the terms and limitations of your health insurance plan.  Call member services at your health plan with any benefit or coverage questions.  _______________________________________________________________________  Authorized by:  Cezar Bryan MD       PLEASE EVALUATE AND TREAT (Evaluation timeline is 24 - 48 hrs. Please call if there is need for a variance to this timeline).     Medications:         Current Outpatient Medications   Medication Sig Dispense Refill    tolterodine ER (DETROL LA) 2 MG 24 hr capsule Take 1-2 capsules (2-4 mg) by mouth daily 10 capsule 0    acetaminophen (TYLENOL) 325 MG tablet Take 3 tablets (975 mg) by mouth every 6 hours 100 tablet 0    albuterol (PROAIR HFA/PROVENTIL HFA/VENTOLIN HFA) 108 (90 Base) MCG/ACT inhaler Inhale 2 puffs into the lungs every 4 hours as needed for shortness of breath / dyspnea 18 g 1    famotidine (PEPCID) 20 MG tablet Take 1 tablet (20 mg) by mouth 2 times daily 60 tablet 0    hydrOXYzine (ATARAX) 25 MG tablet Take 1 tablet (25 mg) by mouth every 6 hours as needed for other (adjuvant pain) 15 tablet 0    medical cannabis (Patient's own supply) See Admin Instructions (The purpose of this order is to document that the patient reports taking medical cannabis.  This is not a prescription, and is not used to certify that the  patient has a qualifying medical condition.)        menthol (ICY HOT) 5 % PTCH Apply 1 patch topically every 8 hours as needed for muscle soreness 10 patch 0    metFORMIN (GLUCOPHAGE) 500 MG tablet TAKE 2 TABLETS BY MOUTH 2 TIMES DAILY WITH MEALS (Patient taking differently: Take 500 mg by mouth 2 times daily (with meals) TAKE 2 TABLETS BY MOUTH 2 TIMES DAILY WITH MEALS) 360 tablet 1    methocarbamol (ROBAXIN) 750 MG tablet Take 1 tablet (750 mg) by mouth 4 times daily as needed for muscle spasms 40 tablet 0    ondansetron (ZOFRAN ODT) 4 MG ODT tab Take 1 tablet (4 mg) by mouth every 8 hours as needed for nausea or vomiting (Patient taking differently: Take 4 mg by mouth as needed for nausea or vomiting) 10 tablet 1    Ostomy Supplies MISC 20 each daily 20 each 11    oxyCODONE (ROXICODONE) 5 MG tablet Take 3 tablets (15 mg) by mouth every 4 hours 36 tablet 0    oxyCODONE IR (ROXICODONE) 5 MG tablet Take 2 tablets (10 mg) by mouth every 4 hours 60 tablet 0    phentermine (ADIPEX-P) 15 MG capsule Take 15 mg by mouth every morning        venlafaxine (EFFEXOR) 100 MG tablet Take 1 tablet (100 mg) by mouth 2 times daily Total of 150 mg bid 180 tablet 1    venlafaxine (EFFEXOR) 50 MG tablet Take 1 tablet (50 mg) by mouth 2 times daily Total of 150 mg twice daily 180 tablet 1      Problems:      Patient Active Problem List   Diagnosis    CARDIOVASCULAR SCREENING; LDL GOAL LESS THAN 160    DUB (dysfunctional uterine bleeding)    Anxiety state    Esophageal reflux    Moderate major depression (H)    Mild intermittent asthma without complication    Vision changes    Prothrombin mutation (H)    Metastatic malignant melanoma (H)    Malignant melanoma of left upper extremity including shoulder (H)    Functional diarrhea    Colitis    Rectal bleeding    Bilateral leg cramps    Rash and nonspecific skin eruption    Other chronic pain    Immunosuppressed status (H)    Ulcerative colitis with complication, unspecified location (H)     Morbid obesity (H)    Cervical high risk HPV (human papillomavirus) test positive    STORMY (obstructive sleep apnea)    Secondary lymphedema    Chronic neutrophilia    Diabetes mellitus, iatrogenic (H)    High risk HPV infection    Abdominal pain    Anemia    Candidiasis of mouth    Hypocalcemia    Malaise    Menstrual irregularity    Arthritis, rheumatoid (H)    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 difficile colitis    Ulcerative colitis without complications, unspecified location (H)    Colostomy in place (H)    S/P colectomy    Polyarthralgia    Drug-induced polyneuropathy (H)    Arthralgia of both knees    Adjustment disorder with mixed emotional features    Migraines    Biliary dyskinesia    Gastroesophageal reflux disease without esophagitis    Post-op pain      Diet:  None        Code Status:    Code Status: Prior     Allergies:  Bee venom, Azithromycin, Erythromycin, Fentanyl, Prochlorperazine, Buspirone, Erythrocin, Gluten meal, Topamax [topiramate], Zithromax [azithromycin dihydrate], and Enbrel [etanercept]             Order  Home Care Referral [9046.001] (Order [id number])  Referral Details    Referred By  Referred To   Cezar Bryan MD   [address] [place] [facility] [address]   Phone: [phone]   Fax: [phone]    Diagnoses: Urge incontinence   Order: Adult Urology  Referral       Comment: Please be aware that coverage of these services is subject to the terms and limitations of your health insurance plan.  Call member services at your health plan with any benefit or coverage questions.   [facility] will call you to coordinate care as prescribed your provider. If you don t hear from a representative within 2 business days, please call [phone].           Cezra Bryan,  MD   [address] [place] [address]   Phone: [phone]   Fax: [phone]    Diagnoses: Acute post-operative pain   Urge incontinence   Ulcerative colitis with complication, unspecified location (H)   S/P colectomy   Order: Home Care Referral         Patient Name  Doretha Yu MRN  [id number] Legal Sex  Female   1975     Patient Demographics    Address  [address] [place] [address] Phone  [phone] (Home)  [phone] (Work)  [phone] (Mobile) *Preferred* E-mail Address  [email or web address]     Base CPT Code (Reference Only)    Code CPT Chargeables   9046.001 9046      Existing Charges    Charge Line Charge Code Status Charge Trigger Charge Type   None          Order Information    Order Date/Time Release Date/Time Start Date/Time End Date/Time   23 04:09 PM None 2023 None     Order Details    Frequency Duration Priority Order Class   None None Routine: Next available opening  Home Care     Order Providers    Authorizing Provider Encounter Provider   Cezar Bryan MD None     ABN Associated with this Order    There is no ABN associated with this order.                    Ordering Provider's NPI: [id number]  Cezar VAIL. Irvin    Home Care Referral [[id number]]    Electronically signed by: Cezar Bryan MD on 23 Status: Active   Ordering user: Cezar Bryan MD 23 Ordering provider: Cezar Bryan MD     Order History  Outpatient  Date/Time Action Taken User Additional Information   23 Sign Cezar Bryan MD      Order Questions    Question Answer   Reason for Referral: Skilled Nursing   Note: Must select at least one of Skilled Nursing, Physical Therapy, and/or Speech Therapy in addition to any other services.   Skilled Nursing Eval and Treat for: Wound assessment and care   Is the patient homebound? Yes   Homebound Status (describe the functional limitations that support this patient is confined to his/her home. Medicaid  recipients are not required to be homebound.): Patient has difficulty ambulating >100 ft   I attest that I saw or will see the patient on this date: 9/14/2023   Provider to follow patient JOLANTA LAROSE   Additional Information: patient is post-op with complications, difficulty with ostomy care, new severe urge incontinence, difficult ambulation            Reference Links           Associated Diagnoses    Acute post-operative pain [G89.18]   I spooke with Dr. Ram.  his nursr will be calling to set-up follow-up.   return here for worsrening. continue home pain medications. return for fever. signs infection      Urge incontinence [N39.41]   I suspect this is related to your recent surgery and local irritation.  I can see fluid adjacent to the bladder that was seen on CT yesterday.  the bladder had only 120 cc urine - no signs of significant urinary retention.  recommend  detrol la starting with 2 mg daily (may increase to 4 mg if not effective after 1-2 days) as trial for the next few days until colorectal folloiw-up. perform kegal exercises multiple times daily.   see urology in follow-up      Ulcerative colitis with complication, unspecified location (H) [K51.919]      S/P colectomy [Z90.49]        Source Order Set    Order Set Name Order ID    [id number]         Collection Information              Encounter    View Encounter              Order Report    View Order Information         Lab and Collection    Home Care Referral (Order: [id number]) - 9/14/2023      External System: External ID:   HE EAP [id number]         Order Requisition    Home Care Referral (Order #[id number]) on 9/14/23       Reprint Order Requisition    Home Care Referral (Order #[id number]) on 9/14/23       Tracking Links          [facility]   Gisel Marte RN, Care Coordinator   [facility]   E-mail [email or web address]   [phone]

## 2023-09-15 NOTE — PROGRESS NOTES
Post Op Note     Called to check on patient postoperatively after hospital discharge.       Patient is s/p takedown of prior end ileostomy, creation of ileal J-pouch, and creation of new diverting loop ileostomy with Dr. Quinton Ram  for UC.   Admitted 9/5 and discharged on 9/11.    She has been in an out of the ER multiple times this week for urinary incontinence.     She is crying so hard I can barely understand what she is saying. Once she calmed down a bit she explained that she can't get a seal on her ileostomy. She has tried almost 10 bags today without success. She has a visit with FRANNIE LOPEZ on Wed. I called and left a VM with their team, sent an email, and a secure chat to try and get her in today.    I was able to get a hold of FRANNIE LOPEZ would graciously agreed to see her today which will avoid her going into the ER. I was not able to address any other concerns today as this was the biggest concern. I will reach out Monday.     Annamarie [phone]  [facility]  [facility]

## 2023-09-15 NOTE — PROGRESS NOTES
Wound clinic appointment at 4:00 today   CC RN will follow up in 1-2 business days     [facility]   Gisel Marte RN, Care Coordinator   [facility]   E-mail [email or web address]   [phone]

## 2023-09-15 NOTE — TELEPHONE ENCOUNTER
Patient is calling very anxious and crying stating she is covered in feces and cannot get out of bed.   20 minutes spent on the phone attempting to help patient.   Recent bowel surgery and ER visits.   Declined TCU at last ER visit.   Home care is ordered.   Contacted care coordination to assist with getting home care.   Patient declined to allow me to call for medics to get her help and possibly transferred back to ER for admission. Patient declines to call 911.   Advised I would reach out to care coordination to find some type of assistance.   Reviewed notes from CC and orders request sent to PCP.    Secure chat messages sent to CC's and discussed with Doretha CAMERON on phone.     Britany Keenan RN on 9/15/2023 at 12:18 PM    Reason for Disposition    Caller has URGENT question and triager unable to answer question    Additional Information    Negative: Fever > 100.4 F (38.0 C)    Negative: SEVERE headache and after spinal (epidural) anesthesia    Negative: Vomiting and persists > 4 hours    Negative: Vomiting and abdomen looks much more swollen than usual    Negative: Drinking very little and dehydration suspected (e.g., no urine > 12 hours, very dry mouth, very lightheaded)    Negative: Patient sounds very sick or weak to the triager    Negative: Sounds like a serious complication to the triager    Negative: Bright red, wide-spread, sunburn-like rash    Negative: Chest pain    Negative: Difficulty breathing    Negative: Acting confused (e.g., disoriented, slurred speech) or excessively sleepy    Negative: Surgical incision symptoms and questions    Negative: Pain or burning with passing urine (urination) and male    Negative: Pain or burning with passing urine (urination) and female    Negative: Constipation    Negative: New or worsening leg (calf, thigh) pain    Negative: New or worsening leg swelling    Negative: Dizziness is severe, or persists > 24 hours after surgery    Negative: Symptoms arising from use of  a urinary catheter (Major or Coude)    Negative: Cast problems or questions    Negative: Medication question    Negative: Sounds like a life-threatening emergency to the triager    Protocols used: Post-Op Symptoms and [id number]-CZ

## 2023-09-15 NOTE — PROGRESS NOTES
[facility] Declined     Home care search       [facility] Out of network  [facility] declined due to capacity   [facility] Out of service area   [facility] Out of service area   [facility] declined due to insurance  [facility] declined due to insurance   [facility] Decl[facility]   [facility] at capacity   [facility] Out of service   [facility] No RN on staff for wound care   [facility] Private pay only   [facility] Declined no nurse in the patients location   [facility] 2 weeks out       Just notified clinic triage was notified patient is covered in feces and instructed to call 911/ED    Possible Community Paramedic referral in the future if appropriate     [facility]   Gisel Marte RN, Care Coordinator   [facility]   E-mail [email or web address]   [phone]

## 2023-09-15 NOTE — TELEPHONE ENCOUNTER
Copied from routing message      Dr Naidu,    Home care search this morning has been unsuccessful due to insurance or out of service area .  Patient called the triage RN this morning and reports lying in feces and urine and unable to help herself.  Patient refuses 911 /ED   CC RN informed patient writer will need to complete a [facility] report.  Patient states she just needs Ostomy supplies  CC RN left a message on Wound Clinic VM to call patient back to problem solve .   Please sign off on CP referral for a visit next week  [facility]  Gisel Marte RN, Care Coordinator  [facility]  E-mail [email or web address]  [phone]       [facility]  Gisel Marte RN, Care Coordinator  [facility]  E-mail [email or web address]  [phone]     Birtany Keenan RN on 9/15/2023 at 3:05 PM

## 2023-09-16 NOTE — DISCHARGE INSTRUCTIONS
"Visit Summary/General Recommendations    For your ostomy -  We are using a deep convex one-piece Coloplast SenSura [place] pouch, an Sarah ring, and a belt. We also used Coloplast Brava Elastic Barrier Strips around the edges of the pouch since the edges of this pouch sometimes start to lift.    We cut the opening for the stoma to the outer blue line (largest marking).    After you clean your skin with water/gauze (or paper towel), pat dry.   Then:  2.) Dust stoma powder over any open areas, and gently brush away excess powder (it will only stick to the wet areas that need it).   3.) Dab with 3M Cavilon No Sting Barrier Film) to \"seal it\". Let dry fully before sticking your pouch on.    The belt should be snug but not too tight.    Right now I'm hoping for at least 2 days of wear time. If we make it past 24 hours, that's a huge improvement! I think with the plan we put in place today that you will have a good wear time.       Bandage Change  Wash your hands before and after the dressing change. You may wear gloves if you choose. Gloves are available over the counter at [facility], [facility], [facility], etc.   Use scissors at home that you can designate solely for wound care and cleanse well with each use, (rubbing alcohol or alcohol pads work well for this).    Wash hands.  Remove old dressing.  Wash hands.  1.) Clean wound by flushing with a saline bullet. Then clean the skin around the wound with saline/gauze. Pat dry with gauze.   2.) Cut about a 5 inch strip of the 1/4 inch packing strip that is moistened with Vashe. Squeeze out the excess Vashe.  3.) Using the wooden end of the cotton-tipped applicator, gently guide the packing strip to the depth of the wound. Then pull back slightly. Gently fold the packing strip to fill the depth and leave a tail for removal. As the wound fills in, you will use less packing strip  4.) Cover with an island dressing (Mepore bandage) or gauze/tape  Change dressing daily    Signs and " symptoms of infection:  Bright green drainage  Foul odor  Increasing pain in area  New redness or swelling at the site of the wound or streaks up from wound  New warmth to touch around wound accompanied by redness and swelling  Fever    Need to be seen as soon as possible for infection concerns.    Follow up: 9/20/23    Please call with any questions or concerns.    [place] Huffman RN, CWOCN  [phone]

## 2023-09-16 NOTE — PROGRESS NOTES
[facility]    Start of Care in [facility]: 9/15/2023   Referring Doctor: Judi PAREKR  Primary Care Provider: Anna Naidu   Wound Location: abdomen - ostomy takedown site   Type of Ostomy/Surgery Date/Surgeon: loop ileostomy (second stage of IPAA J pouch surgery), 9/5/23, Dr. Ram     Wound/Ostomy Clinic Visit:  1st    Reason for Visit: leaking ostomy pouch - has gone through 15 pouches today     Pt originally was scheduled for her first outpatient visit next Wednesday. She discharged Monday from [facility]. This writer had talked with her about scheduling a follow up and pt had experience with an end ileostomy so felt fine with being seen next week and seeing her PCP this week for assistance with packing her prior stoma site. This writer received a phone call and email from Annamarie Espinal (colorectal care coordinator) that pt needed to be seen urgently, so this writer called and scheduled the pt for this afternoon.     Subjective: Pt states this is the first time she hasn't been crying today. She has paper towel covering her ostomy pouch. She states she's having bladder leakage, unable to keep her pouch on, and is so frustrated and tired. She states there is stool and urine all over her house. Her daughter came home from school to pick her up and bring her to this appointment. Her daughter needs to get back to the school for a football game (she's a cheerleader).     Pt arrived ambulatory with her daughter.    HPI/Pertinent information from chart review:  From hospital discharge:  This is a 48 year old female with complex medical history to include ulcerative colitis s/p TAC with end ileostomy (laparoscopic) on 6/15/21 with subsequent parastomal hernia, inflammatory arthritis secondary to immunotherapy, chronic prednisone use, type 2 diabetes, obesity, history of CVA in 2004 after giving birth, prothrombin deficiency, obstructive sleep apnea, asthma,  lymphedema, lumbago, chronic pain, depression, anxiety and insomnia. She is now s/p robotic proctectomy, ileostomy takedown, and creation of ileal J-pouch.     There was some concern regarding possible ileovaginal fistula based on thick brown/red vaginal drainage, though this was thought to be less likely based on operative and exam findings. If there continues to be ongoing concerns of drainage of stool or succus from the vagina, we would recommend vaginal contrast vs pouchogram to further assess the presence of a fistula.     1.  Laparoscopic robotic-assisted completion proctectomy.  2.  Takedown of end ileostomy  3.  Creation of ileal J-pouch with ileoanal anastomosis   4.  Flexible pouchoscopy/sigmoidoscopy.  5.  Diverting loop ileostomy creation       Past Medical History:  Patient Active Problem List   Diagnosis    CARDIOVASCULAR SCREENING; LDL GOAL LESS THAN 160    DUB (dysfunctional uterine bleeding)    Anxiety state    Esophageal reflux    Moderate major depression (H)    Mild intermittent asthma without complication    Vision changes    Prothrombin mutation (H)    Metastatic malignant melanoma (H)    Malignant melanoma of left upper extremity including shoulder (H)    Functional diarrhea    Colitis    Rectal bleeding    Bilateral leg cramps    Rash and nonspecific skin eruption    Other chronic pain    Immunosuppressed status (H)    Ulcerative colitis with complication, unspecified location (H)    Morbid obesity (H)    Cervical high risk HPV (human papillomavirus) test positive    STORMY (obstructive sleep apnea)    Secondary lymphedema    Chronic neutrophilia    Diabetes mellitus, iatrogenic (H)    High risk HPV infection    Abdominal pain    Anemia    Candidiasis of mouth    Hypocalcemia    Malaise    Menstrual irregularity    Arthritis, rheumatoid (H)    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 difficile colitis    Ulcerative colitis without complications, unspecified location (H)    Colostomy in place (H)    S/P colectomy    Polyarthralgia    Drug-induced polyneuropathy (H)    Arthralgia of both knees    Adjustment disorder with mixed emotional features    Migraines    Biliary dyskinesia    Gastroesophageal reflux disease without esophagitis    Post-op pain                     Tobacco Use:     Tobacco Use      Smoking status: Former        Packs/day: 1.00        Years: 5.00        Pack years: 5        Types: Cigarettes        Quit date: 3/20/1998        Years since quittin.5      Smokeless tobacco: Never       Diabetic: yes  HgbA1C:   Hemoglobin A1C   Date Value Ref Range Status   2023 6.3 (H) 0.0 - 5.6 % Final     Comment:     Normal <5.7%   Prediabetes 5.7-6.4%    Diabetes 6.5% or higher     Note: Adopted from ADA consensus guidelines.   2021 7.5 (H) 0 - 5.6 % Final     Comment:     Normal <5.7% Prediabetes 5.7-6.4%  Diabetes 6.5% or higher - adopted from ADA   consensus guidelines.       Checks Blood Glucose?:   Average Readings:       Personal/social history:  lives with her high school aged daughter and a puppy    OSTOMY EXAM    Pouch wear time: unable to keep a pouch on     Current pouching system: has tried flat pouches as well as convex pouches that are left over from her prior ostomy     Who changes the pouch? pt     Participants in cares today: pt, daughter    Any limitations (dexterity, vision, hearing)? no     Location: RLQ  Color/Moisture: red, moist  Viable: yes  Size: \"  Shape: round  MCJ: small separation noted from 7-9 (see photo) - extends about 3mm and with minimal depth  Os: 2 - both are on the apex; with the shape of the stoma the 2 stomas fold in toward one another  Protrusion: about 1 cm  Peristomal skin: see photo - mix of adhesive related skin damage from frequent " pouch changes as well as denudements r/t stool on peristomal skin  Output: mushy brown  Abdominal profile/plane: stoma sinks into abdomen with sitting, standing, leaning; area around stoma is soft so appropriate for deep convexity      WOUND EXAM  Wound #1    Stage/tissue depth: full thickness  0.9 cm L x 2.2 cm W x 1.7 cm D  Tunneling: no  Undermining: no  Wound bed type/amount: unable to visualize depth but visible tissue is clean, pink; some coagulum noted at surface; non fluctuant  Wound Edges: open  Periwound: intact  Drainage: small serosanguineous  Odor: no  Pain: no      Discussion/Education:  plan of care with rationale;  pt is able to perform cares (after teaching her how to perform cares, she demonstrated understanding by packing her wound)    Assessment:  Loop ileostomy with minimal protrusion, unusual postioning of os's, and soft abdomen in need of deep convexity with a belt for optimal seal at this time; patient has gone through 15 pouch changes today  Skin stripping, denudements from stool on peristomal skin  Prior ostomy site in need of packing - appears clean/without s/sx of infection  Pt with minimal support at home (high school daughter), no home care available to see pt    Plan of Care:  Ostomy: see below  Wound: see below      Cares Performed:  Pouch change per below plan of care    Topical care: Wound/surrounding skin cleansed with Vashe and gauze. Flushed depth with saline. Patted dry. Cares then performed as noted below.      The following discharge instructions were reviewed with and sent home with the patient:  Visit Summary/General Recommendations    For your ostomy -  We are using a deep convex one-piece Coloplast SenSura Earlimart pouch, an Sarah ring, and a belt. We also used Coloplast Brava Elastic Barrier Strips around the edges of the pouch since the edges of this pouch sometimes start to lift.    We cut the opening for the stoma to the outer blue line (largest marking).    After you clean  "your skin with water/gauze (or paper towel), pat dry.   Then:  2.) Dust stoma powder over any open areas, and gently brush away excess powder (it will only stick to the wet areas that need it).   3.) Dab with 3M Cavilon No Sting Barrier Film) to \"seal it\". Let dry fully before sticking your pouch on.    The belt should be snug but not too tight.    Right now I'm hoping for at least 2 days of wear time. If we make it past 24 hours, that's a huge improvement! I think with the plan we put in place today that you will have a good wear time.       Bandage Change  Wash your hands before and after the dressing change. You may wear gloves if you choose. Gloves are available over the counter at [facility], [facility], [facility], etc.   Use scissors at home that you can designate solely for wound care and cleanse well with each use, (rubbing alcohol or alcohol pads work well for this).    Wash hands.  Remove old dressing.  Wash hands.  1.) Clean wound by flushing with a saline bullet. Then clean the skin around the wound with saline/gauze. Pat dry with gauze.   2.) Cut about a 5 inch strip of the 1/4 inch packing strip that is moistened with Vashe. Squeeze out the excess Vashe.  3.) Using the wooden end of the cotton-tipped applicator, gently guide the packing strip to the depth of the wound. Then pull back slightly. Gently fold the packing strip to fill the depth and leave a tail for removal. As the wound fills in, you will use less packing strip  4.) Cover with an island dressing (Mepore bandage) or gauze/tape  Change dressing daily    Signs and symptoms of infection:  Bright green drainage  Foul odor  Increasing pain in area  New redness or swelling at the site of the wound or streaks up from wound  New warmth to touch around wound accompanied by redness and swelling  Fever    Need to be seen as soon as possible for infection concerns.    Follow up: 9/20/23    Please call with any questions or concerns.    Florida Huffman RN, " "[facility]  [phone]    The following supplies were sent home with the patient:  Sarah Rings x10  Pouches x12  Elastic Barrier Strips x10    1/4\" packing strip in Vashe  Several Mepore bandages    Return visit: Wednesday    Verbal, written, & demonstrative education provided.  Face to face time: approximately 60 minutes  Procedure:     Florida Huffman RN, [facility]  [phone]   "

## 2023-09-17 ENCOUNTER — HEALTH MAINTENANCE LETTER (OUTPATIENT)
Age: 48
End: 2023-09-17

## 2023-09-18 ENCOUNTER — PATIENT OUTREACH (OUTPATIENT)
Dept: SURGERY | Facility: CLINIC | Age: 48
End: 2023-09-18
Payer: COMMERCIAL

## 2023-09-18 ENCOUNTER — TELEPHONE (OUTPATIENT)
Dept: WOUND CARE | Facility: CLINIC | Age: 48
End: 2023-09-18
Payer: COMMERCIAL

## 2023-09-18 ENCOUNTER — PATIENT OUTREACH (OUTPATIENT)
Dept: CARE COORDINATION | Facility: CLINIC | Age: 48
End: 2023-09-18
Payer: COMMERCIAL

## 2023-09-18 LAB
BACTERIA BLD CULT: NO GROWTH
BACTERIA BLD CULT: NO GROWTH

## 2023-09-18 NOTE — TELEPHONE ENCOUNTER
LM with Katy. Looks like pt was scheduled at [facility] wound ostomy clinic this week. No need to Change this. As of now  I have no openings on Wednesday to accommodate appt together with Cher Crandall.       ----- Message from Patricia Sosa RN sent at 9/18/2023  8:20 AM CDT -----   Katy Peng Pt is having difficulties with her pches and leaking stool often [phone]

## 2023-09-18 NOTE — PROGRESS NOTES
Colon and Rectal Surgery Postoperative Clinic Note    RE: Doretha Yu  : 1975  JUNIOR: 2023    Doretha Yu is a very pleasant 48 year old female with a history of ulcerative colitis with prior total abdominal colectomy with end ileostomy now status post robotic completion proctectomy with creation of J-pouch with IPAA, takedown of end ileostomy, and diverting loop ileostomy with Dr. Ram on 23.    Final Diagnosis   A. ILEUM, ILEOSTOMY TRIM;  Ileo-cutaneous anastomotic junction with nonspecific mucosa inflammation, congestion, and other features consistent with ileostomy; no dysplasia or malignancy    B. RECTUM, COMPLETION PROCTECTOMY:  Mildly active chronic proctitis with:   -Neutrophilic cryptitis, mucosal atrophy and prominent reactive lymphoid hyperplasias   -Reactive mesorectal lymph nodes;  no dysplasia or malignancy     C. COLON, ANASTOMOTIC RINGS; EXCISION:   -Portions of small intestinal mucosa and wall with no morphologic abnormalities   -Portion of rectal mucosa and wall with chronic proctitis; no dysplasia or malignancy      Interval history: Having a rough recovery for a number of reasons. Baseline anxiety is worsened because she has no home care and no one really to help at home. She had some pouch explosions and was finally able to see [facility] on Friday, which has helped a lot with this. She sees them again later today. She has passed a lot of bloody and mucous per rectum, but this has slowed down. Mostly just mucous now. She has pelvic pain that feels like a menstrual cramp. She is not taking imodium. She is following a low fiber gluten-free diet. Not measuring output lately. Empties her bag 3-4 times during the day and 5-6 times at night.     Physical Examination:  /84 (BP Location: Right arm, Patient Position: Sitting, Cuff Size: Adult Large)   Pulse 114   LMP 2023 (Approximate)   SpO2 97%   General: alert, oriented, in no acute distress, sitting  comfortably  HEENT: moist mucous membranes  Abdomen: Laparoscopic incisions all look well approximated. Takedown site on RUQ open, very shallow.    Assessment/Plan:  48 year old female with a history of ulcerative colitis with prior total abdominal colectomy with end ileostomy now status post robotic completion proctectomy with creation of J-pouch with IPAA, takedown of end ileostomy, and diverting loop ileostomy with Dr. Ram on 23. Having a rough recovery but medical looks fairly well. Start imodium a few times a day. Output was very watery today. This may also help decrease how much she gets up at night.  May transition to a regular diet. Avoid lifting >10 lb for another month. Scheduled to see Dr. Ram on 10/10/23. Discussed that she may have a flexible sigmoidoscopy and GGE before surgery. Patient's questions were answered to her stated satisfaction and she is in agreement with this plan.     Medical history:  Past Medical History:   Diagnosis Date    Abnormal MRI     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     Anxiety     Basal cell carcinoma     Cervical high risk HPV (human papillomavirus) test positive 2019    See problem list    Colitis     Depression     Diabetes mellitus, iatrogenic (H) 2020    Esophageal reflux     Inflammatory arthritis     Insomnia     Intestinal giardiasis 2018    Lumbago     left lower back pain    Lymphedema     Malignant melanoma (H)     Melanoma (H) 10/23/2017    Migraines     Mild persistent asthma     Morbid obesity with BMI of 40.0-44.9, adult (H)     STORMY (obstructive sleep apnea)     Prothrombin deficiency (H)     takes 81mg asa daily    Stroke (cerebrum) (H)     During     TIA (transient ischemic attack)     Type 2 diabetes mellitus (H)     Ulcerative pancolitis (H)        Surgical history:  Past Surgical History:   Procedure Laterality Date    APPENDECTOMY  2004    COLONOSCOPY N/A 10/18/2017    Procedure:  COLONOSCOPY;  Colon;  Surgeon: Debbie Stephens MD;  [facility]    COLONOSCOPY N/A 2018    Procedure: COMBINED COLONOSCOPY, SINGLE OR MULTIPLE BIOPSY/POLYPECTOMY BY BIOPSY;  colon;  Surgeon: Benita Schumacher MD;  Location: [facility]    COLONOSCOPY      multiple since  to present - about 6 total    DAVINCI ASSISTED TRANSANAL TOTAL MESORECTAL EXCISION N/A 2023    Procedure: COMPLETION PROCTECTOMY, ROBOT-ASSISTED, ILEAL POUCH ANASTAMOSIS;  Surgeon: Quinton Ram MD;  Location: [facility]    DISSECT LYMPH NODE AXILLA Left 10/23/2017    Procedure: DISSECT LYMPH NODE AXILLA;  Left Axillary Lymph Node Dissection ;  Surgeon: Laurent Cool MD;  Location: [facility]    EXAM UNDER ANESTHESIA PELVIC N/A 2020    Procedure: EXAM UNDER ANESTHESIA, PELVIS; with Cervical Biopsies, Vaginal Biopsy and Endocervical Curettings;  Surgeon: Melina Jung MD;  Location: [facility]    GYN SURGERY  ,         LAPAROSCOPIC ASSISTED COLECTOMY N/A 06/15/2021    Procedure: laparoscopic total abdominal colectomy, end ileostomy;  Surgeon: Quitnon Ram MD;  Location: [facility]    REPAIR MOHS Left 2017    Procedure: REPAIR MOHS;  Left Upper Lid Moh's Reconstruction;  Surgeon: Kisha Bosch MD;  [facility]    SIGMOIDOSCOPY FLEXIBLE N/A 2023    Procedure: Sigmoidoscopy flexible;  Surgeon: Quinton Ram MD;  Location: [facility]       Problem list:    Patient Active Problem List    Diagnosis Date Noted    Post-op pain 2023     Priority: Medium    Gastroesophageal reflux disease without esophagitis 2023     Priority: Medium    Biliary dyskinesia 2023     Priority: Medium    Migraines      Priority: Medium    Colostomy in place (H) 2021     Priority: Medium    S/P colectomy 2021     Priority: Medium    Ulcerative colitis without complications, unspecified location (H) 2021     Priority: Medium    Dehydration 2021     Priority: Medium     Hematochezia 04/13/2021     Priority: Medium    Diarrhea of infectious origin 04/13/2021     Priority: Medium    C. difficile colitis 04/13/2021     Priority: Medium    Adjustment disorder with mixed emotional features 06/16/2020     Priority: Medium    Polyarthralgia 04/29/2020     Priority: Medium    Drug-induced polyneuropathy (H) 04/29/2020     Priority: Medium    Pain syndrome, chronic 02/12/2020     Priority: Medium    Drug-induced Cushing's syndrome (H) 02/12/2020     Priority: Medium    Diabetes mellitus, iatrogenic (H) 01/28/2020     Priority: Medium    High risk HPV infection 01/28/2020     Priority: Medium     Added automatically from request for surgery [id number]      Immunosuppression (H) 01/28/2020     Priority: Medium     Added automatically from request for surgery [id number]      STORMY (obstructive sleep apnea) 01/27/2020     Priority: Medium     1/2019 (241#)-AHI 24, lowest oxygen saturation was 86%, no periodic limb movement were noted, CPAP 8 cm/H20 was effective.      Secondary lymphedema 01/27/2020     Priority: Medium    Chronic neutrophilia 01/27/2020     Priority: Medium    Cervical high risk HPV (human papillomavirus) test positive 12/13/2019     Priority: Medium     2011 NIL pap.  2015 NIL pap, Neg HPV.  12/13/19 NIL pap , + HR HPV 16. Plan colp.   1/6/19 Failed colp exam secondary to anatomic constraints with gyn. Referred to gyn/onc.   2/25/20 Colpo bx and ECC Negative for dysplasia. Plan cotest in 1 year.   8/20/21 NIL pap, Neg HPV. Plan cotest in 1 year per provider.   9/28/22 Lost to follow-up for pap tracking       Immunosuppressed status (H) 09/05/2019     Priority: Medium    Ulcerative colitis with complication, unspecified location (H) 09/05/2019     Priority: Medium    Morbid obesity (H) 09/05/2019     Priority: Medium    Arthritis, rheumatoid (H) 11/26/2018     Priority: Medium    Hypocalcemia 05/15/2018     Priority: Medium    Anemia 05/14/2018     Priority: Medium    Menstrual  irregularity 05/14/2018     Priority: Medium    Arthralgia of both knees 05/12/2018     Priority: Medium     Formatting of this note might be different from the original.  Work-up in progress. There is concern for inflammatory arthritis.      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     Priority: Medium    Malignant melanoma of left upper extremity including shoulder (H) 10/12/2017     Priority: Medium    Metastatic malignant melanoma (H) 10/10/2017     Priority: Medium    Prothrombin mutation (H) 04/05/2017     Priority: Medium     On daily aspirin 81 mg per hematology's recommendations from 2008      Vision changes 04/01/2017     Priority: Medium    Mild intermittent asthma without complication 11/08/2013     Priority: Medium    Moderate major depression (H) 06/24/2013     Priority: Medium    Anxiety state 09/13/2012     Priority: Medium     Problem list name updated by automated process. Provider to review      Esophageal reflux 09/13/2012     Priority: Medium    DUB (dysfunctional uterine bleeding) 07/28/2011     Priority: Medium    CARDIOVASCULAR SCREENING; LDL GOAL LESS THAN 160 07/28/2011     Priority: Low       Medications:  Current Outpatient Medications   Medication Sig Dispense Refill    acetaminophen (TYLENOL) 325 MG tablet Take 3 tablets (975 mg) by mouth every 6 hours 100 tablet 0    albuterol (PROAIR HFA/PROVENTIL HFA/VENTOLIN HFA) 108 (90 Base) MCG/ACT inhaler Inhale 2 puffs  into the lungs every 4 hours as needed for shortness of breath / dyspnea 18 g 1    famotidine (PEPCID) 20 MG tablet Take 1 tablet (20 mg) by mouth 2 times daily 60 tablet 0    hydrOXYzine (ATARAX) 25 MG tablet Take 1 tablet (25 mg) by mouth every 6 hours as needed for other (adjuvant pain) 15 tablet 0    medical cannabis (Patient's own supply) See Admin Instructions (The purpose of this order is to document that the patient reports taking medical cannabis.  This is not a prescription, and is not used to certify that the patient has a qualifying medical condition.)      menthol (ICY HOT) 5 % PTCH Apply 1 patch topically every 8 hours as needed for muscle soreness 10 patch 0    metFORMIN (GLUCOPHAGE) 500 MG tablet TAKE 2 TABLETS BY MOUTH 2 TIMES DAILY WITH MEALS (Patient taking differently: Take 500 mg by mouth 2 times daily (with meals) TAKE 2 TABLETS BY MOUTH 2 TIMES DAILY WITH MEALS) 360 tablet 1    methocarbamol (ROBAXIN) 750 MG tablet Take 1 tablet (750 mg) by mouth 4 times daily as needed for muscle spasms 40 tablet 0    ondansetron (ZOFRAN ODT) 4 MG ODT tab Take 1 tablet (4 mg) by mouth every 8 hours as needed for nausea or vomiting (Patient taking differently: Take 4 mg by mouth as needed for nausea or vomiting) 10 tablet 1    Ostomy Supplies MISC 20 each daily 20 each 11    oxyCODONE (ROXICODONE) 5 MG tablet Take 3 tablets (15 mg) by mouth every 4 hours 36 tablet 0    oxyCODONE IR (ROXICODONE) 5 MG tablet Take 2 tablets (10 mg) by mouth every 4 hours 60 tablet 0    phentermine (ADIPEX-P) 15 MG capsule Take 15 mg by mouth every morning      tolterodine ER (DETROL LA) 2 MG 24 hr capsule Take 1-2 capsules (2-4 mg) by mouth daily 10 capsule 0    venlafaxine (EFFEXOR) 100 MG tablet Take 1 tablet (100 mg) by mouth 2 times daily Total of 150 mg bid 180 tablet 1    venlafaxine (EFFEXOR) 50 MG tablet Take 1 tablet (50 mg) by mouth 2 times daily Total of 150 mg twice daily 180 tablet 1       Allergies:  Allergies    Allergen Reactions    Bee Venom Swelling    Azithromycin Diarrhea    Erythromycin      Other reaction(s): GI intolerance, Vomiting    Fentanyl Other (See Comments)     sweating  sweating    Prochlorperazine Fatigue     Other reaction(s): Other (see comments)  Fatigue    Buspirone      Other reaction(s): GI intolerance  vomiting    Erythrocin Nausea and Vomiting    Gluten Meal      Celiac disease    Topamax [Topiramate]      Made her lethargic    Zithromax [Azithromycin Dihydrate] Diarrhea    Enbrel [Etanercept] Hives and Rash       Family history:  Family History   Problem Relation Age of Onset    Cancer Mother 45        lung    Neurologic Disorder Mother         epilepsy    Lipids Father     Gastrointestinal Disease Father         diverticulitis     Depression Father     Colitis Father     Colon Cancer Father     Diverticulitis Father     Depression Sister     Cancer Maternal Grandmother     Blood Disease Maternal Grandmother         lymphoma     Arthritis Maternal Grandmother     Diabetes Maternal Grandmother     Depression Maternal Grandmother     Macular Degeneration Maternal Grandmother     Glaucoma Maternal Grandmother     Diabetes Maternal Grandfather     Cerebrovascular Disease Maternal Grandfather     Blood Disease Maternal Grandfather     Heart Disease Maternal Grandfather     Glaucoma Maternal Grandfather     Cancer Paternal Grandmother     Cancer - colorectal Paternal Grandmother     Colitis Paternal Grandmother     Colon Cancer Paternal Grandmother     Diverticulitis Paternal Grandmother     Respiratory Paternal Grandfather         emphysema     Colitis Paternal Grandfather     Colon Cancer Paternal Grandfather     Diverticulitis Paternal Grandfather     Heart Disease Daughter     Asthma Daughter     Melanoma No family hx of     Anesthesia Reaction No family hx of     Clotting Disorder No family hx of        Social history:  Social History     Tobacco Use    Smoking status: Former     Packs/day: 1.00      Years: 5.00     Pack years: 5.00     Types: Cigarettes     Quit date: 3/20/1998     Years since quittin.5    Smokeless tobacco: Never   Substance Use Topics    Alcohol use: Not Currently     Marital status: .    Nursing Notes:   Tariq Shelby, EMT  2023 11:06 AM  Signed  Chief Complaint   Patient presents with    Post-op Visit       Vitals:    23 1104   BP: 119/84   BP Location: Right arm   Patient Position: Sitting   Cuff Size: Adult Large   Pulse: 114   SpO2: 97%       There is no height or weight on file to calculate BMI.    Tariq Shelby EMT-P       20 minutes spent on the date of the encounter doing chart review, history and exam, documentation and further activities as noted above.   This is a postop visit.      Mariela Crandall PA-C  Colon and Rectal Surgery  [facility]

## 2023-09-18 NOTE — PROGRESS NOTES
"I called Doretha to check in. Her stoma pouching is going much better. She saw [facility] on Friday which was very helpful for her. She does still having mucous drainage from the j pouch with blood clots. She does have urgency with this almost every time she urinates. She states the drainage \"is like a faucet.\" The urinary concerns are much better. She is able to control her bladder much more.     She did spend most of our conversation venting about her experience in the ER last week. Unfortunately she did not have a good experience and was under a great deal of mental stress. She does have the number for patient relations. She is worried that if she reaches out to them that this will affect her future surgeries with us. I tried to reassure her that it will not affect her care in our clinic. I told her that we have been thinking about her case a lot and recognize that her mental health has been severely affected by her current physical health.     Updated Dr. Ram about situation. Told Doretha we will see her Wednesday and may do an exam at that point.       "

## 2023-09-18 NOTE — PROGRESS NOTES
"Community Paramedic Program  Community Health Worker Initial Outreach    Referral source: Pt's PCP & CC RN  Reason(s) for visit: Initial Assessment    Home/Safety Assessment    ED/Hospital Follow-Up   Goals for visit(s): Decrease Wound    Decrease ER/Clinic/Ambulance Utilization   How often should patient be seen: Weekly   Preference on when patient should be seen: 1-2 Days     Comment: PCP:  Anna Naidu      Other info that would be helpful:  Needs Wound change with nu gauze packing to wound  Look at Wound Care advice could use moist gauze   Teach patient to do 1-2 times a day    Check and see what dressing/ ostomy supplies needed in the home     Initial visit: pt declined to schedule       Additional information:    Spoke with pt. Explained my role and the reason for my call. Pt confirmed that she was aware we'd be reaching out. Pt said she was able to get into the wound clinic on Friday evening and get the supplies that properly fit her ostomy and that she needed at home. Pt said the supplies that she was sent home with from the hospital didn't work, so she continued having leaks. Pt said she was successfully able to change her ostomy and repack/clean her wound this morning at 4:30 am. Pt shared she has another wound clinic visit on Wednesday and \"I feel comfortable doing it myself now.\" She declined to schedule a home visit at this time. I encouraged her to contact me if anything changes, and she agreed.    Gave pt a brief description of our program, per her request, and examples of how a CP can assist at home. She said she was supposed to have home care but due to her insurance requirements and lack of staffing, her clinic has not been able to find an agency. Pt said she has a surgery coming up in 13 weeks and she's hoping to have home care services and support when she gets home. She asked if she could reach out in the future if she needs CP assistance in the short term, and I agreed. Shared that I can " "reach out to her PCP for a referral/orders and explained how we work with her primary care clinic. Pt said she understood and accepted my offer to send my information via [employer].    When I asked how pt has been feeling, she said \"I'm still in pain but every day it gets a little bit better.\" Pt said she's doing her best to manage at home and expressed appreciation for the call today.    Routing to pt's PCP, CC RN and CP for awareness.    Celeste Keenan  Community Health Worker  [facility] Paramedic program  [phone]  [email or web address]          "

## 2023-09-19 ENCOUNTER — PATIENT OUTREACH (OUTPATIENT)
Dept: CARE COORDINATION | Facility: CLINIC | Age: 48
End: 2023-09-19
Payer: COMMERCIAL

## 2023-09-19 ASSESSMENT — ACTIVITIES OF DAILY LIVING (ADL): DEPENDENT_IADLS:: INDEPENDENT

## 2023-09-19 NOTE — PROGRESS NOTES
Clinic Care Coordination Contact  Follow Up Progress Note      Assessment:   Patient reports doing much better now that she has connected with the wound clinic and declined a Community Paramedic visit .    Patient  now has the right supplies and a better fitting pouch   Patient continues to have some discomfort but her medication and rest has helped     Care Gaps:    Health Maintenance Due   Topic Date Due    DIABETIC FOOT EXAM  Never done    Pneumococcal Vaccine: Pediatrics (0 to 5 Years) and At-Risk Patients (6 to 64 Years) (1 - PCV) Never done    YEARLY PREVENTIVE VISIT  03/02/2016    EYE EXAM  10/12/2018    ASTHMA ACTION PLAN  01/28/2020    COVID-19 Vaccine (4 - Pfizer risk series) 10/25/2021    DTAP/TDAP/TD IMMUNIZATION (7 - Td or Tdap) 04/09/2022    PAP FOLLOW-UP  08/20/2022    HPV FOLLOW-UP  08/20/2022    URINE DRUG SCREEN  05/18/2023    MAMMO SCREENING  05/31/2023    INFLUENZA VACCINE (1) 09/01/2023       Not discussed today     Care Plans  Care Plan: General       Problem: HP GENERAL PROBLEM       Goal: I will have a good plan for wound care in the next week       Start Date: 9/15/2023 Expected End Date: 11/3/2023    This Visit's Progress: 50% Recent Progress: 0%    Note:     Barriers: Anxiety  Unable to find home care services   Strengths: lives with daughter   Patient expressed understanding of goal: Yes   Action steps to achieve this goal:  1. I will have a Community Paramedic visit Not needed   2. Care coordinator will call surgeons office and ostomy RN for supplies   3. I will keep wound clinic appointments                                 Intervention/Education provided during outreach: Patient will seek medical help with questions or concerns      Outreach Frequency: 2 weeks        Plan:   Patient will keep Wound Clinic and Post Op appointment scheduled tomorrow   Care Coordinator will follow up in 1-2 weeks   [facility]   Gisel Marte RN, Care Coordinator   [facility]  and [facility]'s   E-mail [email or web address]   [phone]

## 2023-09-20 ENCOUNTER — HOSPITAL ENCOUNTER (OUTPATIENT)
Dept: WOUND CARE | Facility: CLINIC | Age: 48
Discharge: HOME | End: 2023-09-20
Attending: PHYSICIAN ASSISTANT | Admitting: PHYSICIAN ASSISTANT
Payer: COMMERCIAL

## 2023-09-20 ENCOUNTER — OFFICE VISIT (OUTPATIENT)
Dept: SURGERY | Facility: CLINIC | Age: 48
End: 2023-09-20
Payer: COMMERCIAL

## 2023-09-20 VITALS — HEART RATE: 114 BPM | DIASTOLIC BLOOD PRESSURE: 84 MMHG | SYSTOLIC BLOOD PRESSURE: 119 MMHG | OXYGEN SATURATION: 97 %

## 2023-09-20 DIAGNOSIS — Z09 FOLLOW-UP EXAMINATION AFTER COLORECTAL SURGERY: Primary | ICD-10-CM

## 2023-09-20 DIAGNOSIS — Z93.2 ILEOSTOMY IN PLACE (H): ICD-10-CM

## 2023-09-20 DIAGNOSIS — K51.018 ULCERATIVE PANCOLITIS WITH OTHER COMPLICATION (H): ICD-10-CM

## 2023-09-20 DIAGNOSIS — Z93.2 S/P ILEOSTOMY (H): ICD-10-CM

## 2023-09-20 PROCEDURE — G0463 HOSPITAL OUTPT CLINIC VISIT: HCPCS

## 2023-09-20 PROCEDURE — 99024 POSTOP FOLLOW-UP VISIT: CPT

## 2023-09-20 RX ORDER — LOPERAMIDE HYDROCHLORIDE 2 MG/1
2 TABLET ORAL 4 TIMES DAILY PRN
Qty: 120 TABLET | Refills: 1 | Status: ON HOLD | OUTPATIENT
Start: 2023-09-20 | End: 2023-11-08

## 2023-09-20 ASSESSMENT — PAIN SCALES - GENERAL: PAINLEVEL: SEVERE PAIN (7)

## 2023-09-20 NOTE — PROGRESS NOTES
[facility]    Start of Care in [facility]: 9/15/2023   Referring Doctor: Judi PARKER  Primary Care Provider: Anna Naidu   Wound Location: abdomen - ostomy takedown site   Type of Ostomy/Surgery Date/Surgeon: loop ileostomy (second stage of IPAA J pouch surgery), 9/5/23, Dr. Ram     Wound/Ostomy Clinic Visit:  2nd    Reason for Visit: ileostomy recheck; take down site recheck          Subjective: Pt states the pouch applied in clinic on Friday afternoon lasted until Sunday morning when it leaked. The next pouch lasted until 0500 Tuesday. She states the colorectal team is starting her on Imodium because she's filling so much during the evening and night. Otherwise she states things are going much better. She saw the colorectal team today, and the PA doesn't think the take down site needs to be packed anymore. She asked this writer to look at it also.      From 1st ostomy clinic visit: Pt originally was scheduled for her first outpatient visit next Wednesday. She discharged Monday from [facility]. This writer had talked with her about scheduling a follow up and pt had experience with an end ileostomy so felt fine with being seen next week and seeing her PCP this week for assistance with packing her prior stoma site. This writer received a phone call and email from Annamarie Espinal (colorectal care coordinator) that pt needed to be seen urgently, so this writer called and scheduled the pt for this afternoon. Pt states this is the first time she hasn't been crying today. She has paper towel covering her ostomy pouch. She states she's having bladder leakage, unable to keep her pouch on, and is so frustrated and tired. She states there is stool and urine all over her house. Her daughter came home from school to pick her up and bring her to this appointment. Her daughter needs to get back to the school for a football game (she's a cheerleader).     Pt arrived  ambulatory with her son. He waited in the lobby.    HPI/Pertinent information from chart review:  From hospital discharge:  This is a 48 year old female with complex medical history to include ulcerative colitis s/p TAC with end ileostomy (laparoscopic) on 6/15/21 with subsequent parastomal hernia, inflammatory arthritis secondary to immunotherapy, chronic prednisone use, type 2 diabetes, obesity, history of CVA in 2004 after giving birth, prothrombin deficiency, obstructive sleep apnea, asthma, lymphedema, lumbago, chronic pain, depression, anxiety and insomnia. She is now s/p robotic proctectomy, ileostomy takedown, and creation of ileal J-pouch.     There was some concern regarding possible ileovaginal fistula based on thick brown/red vaginal drainage, though this was thought to be less likely based on operative and exam findings. If there continues to be ongoing concerns of drainage of stool or succus from the vagina, we would recommend vaginal contrast vs pouchogram to further assess the presence of a fistula.     1.  Laparoscopic robotic-assisted completion proctectomy.  2.  Takedown of end ileostomy  3.  Creation of ileal J-pouch with ileoanal anastomosis   4.  Flexible pouchoscopy/sigmoidoscopy.  5.  Diverting loop ileostomy creation     Past Medical History:  Patient Active Problem List   Diagnosis    CARDIOVASCULAR SCREENING; LDL GOAL LESS THAN 160    DUB (dysfunctional uterine bleeding)    Anxiety state    Esophageal reflux    Moderate major depression (H)    Mild intermittent asthma without complication    Vision changes    Prothrombin mutation (H)    Metastatic malignant melanoma (H)    Malignant melanoma of left upper extremity including shoulder (H)    Functional diarrhea    Colitis    Rectal bleeding    Bilateral leg cramps    Rash and nonspecific skin eruption    Other chronic pain    Immunosuppressed status (H)    Ulcerative colitis with complication, unspecified location (H)    Morbid obesity (H)     Cervical high risk HPV (human papillomavirus) test positive    STORMY (obstructive sleep apnea)    Secondary lymphedema    Chronic neutrophilia    Diabetes mellitus, iatrogenic (H)    High risk HPV infection    Abdominal pain    Anemia    Candidiasis of mouth    Hypocalcemia    Malaise    Menstrual irregularity    Arthritis, rheumatoid (H)    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 difficile colitis    Ulcerative colitis without complications, unspecified location (H)    Colostomy in place (H)    S/P colectomy    Polyarthralgia    Drug-induced polyneuropathy (H)    Arthralgia of both knees    Adjustment disorder with mixed emotional features    Migraines    Biliary dyskinesia    Gastroesophageal reflux disease without esophagitis    Post-op pain                     Tobacco Use:     Tobacco Use      Smoking status: Former        Packs/day: 1.00        Years: 5.00        Pack years: 5        Types: Cigarettes        Quit date: 3/20/1998        Years since quittin.5      Smokeless tobacco: Never       Diabetic: yes  HgbA1C:   Hemoglobin A1C   Date Value Ref Range Status   2023 6.3 (H) 0.0 - 5.6 % Final     Comment:     Normal <5.7%   Prediabetes 5.7-6.4%    Diabetes 6.5% or higher     Note: Adopted from ADA consensus guidelines.   2021 7.5 (H) 0 - 5.6 % Final     Comment:     Normal <5.7% Prediabetes 5.7-6.4%  Diabetes 6.5% or higher - adopted from ADA   consensus guidelines.       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 pt     Participants in cares today: pt    Any limitations (dexterity, vision, hearing)? no     Location: RLQ  Color/Moisture: red, moist  Viable: yes  Size: 1 1/8\"  Shape: round  MCJ: intact  Os: 2 - both are on the apex; with the shape of the stoma the 2 stomas fold in toward one another  Protrusion: about 1 cm  Peristomal skin: intact erythema from 2-5:00 extending <5mm and diffused pink satellite lesions with itching  Output: liquid brown  Abdominal profile/plane: stoma sinks into abdomen with sitting, standing, leaning; area around stoma is soft so appropriate for deep convexity      WOUND EXAM  Wound #1    No new photo 9/20/23  Stage/tissue depth: full thickness  0.5 cm L x 1.5 cm W x 1.1 cm D  Tunneling: no  Undermining: no  Wound bed type/amount: clean, pink; non fluctuant  Wound Edges: open  Periwound: intact  Drainage: small serosanguineous (more sanguineous today)  Odor: no  Pain: no      Discussion/Education:  plan of care with rationale; eating marshmallows or peanut butter (or both) prior to going to sleep to help thicken stool; pt is able to perform cares     Assessment:  Loop ileostomy with minimal protrusion, unusual postioning of os's, and soft abdomen - deep convex is working well until pouch becomes overfull when she's sleeping  Skin stripping, denudements from stool on peristomal skin - skin stripping is resolved; denudements are healed, new evidence of pink satellite lesions  Prior ostomy site healing well, no longer deep enough to need packing  Pt with minimal support at home (high school daughter), no home care available to see pt    Plan of Care:  Ostomy: Continuing plan of care per below; she will start Imodium  Wound: stopping packing; will switch to covering with Mepilex Lite 3x3 to be changed Q3 days or sooner for drainage covering >75% of bandage      Cares Performed:  Pouch change per " "below plan of care    Topical care: Wound/surrounding skin cleansed with Vashe and gauze. Flushed depth with saline. Patted dry. Then covered with a Mepilex Lite 3x3.      The following discharge instructions were reviewed with and sent home with the patient (prior; no new today):  Visit Summary/General Recommendations    For your ostomy -  We are using a deep convex one-piece Coloplast SenSura [place] pouch 86254, an Sarah ring, and a belt. We also used Coloplast Brava Elastic Barrier Strips around the edges of the pouch since the edges of this pouch sometimes start to lift.    We cut the opening for the stoma to the outer blue line (largest marking).    After you clean your skin with water/gauze (or paper towel), pat dry.   Then:  2.) Dust stoma powder over any open areas, and gently brush away excess powder (it will only stick to the wet areas that need it).   3.) Dab with 3M Cavilon No Sting Barrier Film) to \"seal it\". Let dry fully before sticking your pouch on.    The belt should be snug but not too tight.    Right now I'm hoping for at least 2 days of wear time. If we make it past 24 hours, that's a huge improvement! I think with the plan we put in place today that you will have a good wear time.    Bandage Change  Wash your hands before and after the dressing change. You may wear gloves if you choose. Gloves are available over the counter at [facility], [facility], [facility], etc.   Use scissors at home that you can designate solely for wound care and cleanse well with each use, (rubbing alcohol or alcohol pads work well for this).    Wash hands.  Remove old dressing.  Wash hands.  1.) Clean wound by flushing with a saline bullet. Then clean the skin around the wound with saline/gauze. Pat dry with gauze.   2.) Cut about a 5 inch strip of the 1/4 inch packing strip that is moistened with Vashe. Squeeze out the excess Vashe.  3.) Using the wooden end of the cotton-tipped applicator, gently guide the packing strip to the " depth of the wound. Then pull back slightly. Gently fold the packing strip to fill the depth and leave a tail for removal. As the wound fills in, you will use less packing strip  4.) Cover with an island dressing (Mepore bandage) or gauze/tape  Change dressing daily    Signs and symptoms of infection:  Bright green drainage  Foul odor  Increasing pain in area  New redness or swelling at the site of the wound or streaks up from wound  New warmth to touch around wound accompanied by redness and swelling  Fever    Need to be seen as soon as possible for infection concerns.    The following supplies were sent home with the patient:  5 Mepilex Lite bandages  Coloplast SenSura [place] belts    Will place an order to [facility] for the following:  Pouch to be changed every 36 hours (total of 20 pouch changes/month)  Keith Adapt Adhesive Remover Wipes  Coloplast SenSura [place] Deep Convex [address]  Coloplast Brava Elastic Barrier Strips [id number]  Coloplast SenSura [place] Belt 4237  Cavilon No Sting Barrier Wipes 3342  ConvaTec Sarah Ring [id number]  Stoma powder (not needed right now)        Return visit: 2 weeks    Verbal, written (prior), & demonstrative education provided.  Face to face time: approximately 60 minutes  Procedure: none    [place] Huffman RN, CWOCN  [phone]

## 2023-09-20 NOTE — NURSING NOTE
Chief Complaint   Patient presents with    Post-op Visit       Vitals:    09/20/23 1104   BP: 119/84   BP Location: Right arm   Patient Position: Sitting   Cuff Size: Adult Large   Pulse: 114   SpO2: 97%       There is no height or weight on file to calculate BMI.    Tariq Shelby EMT-P

## 2023-09-20 NOTE — LETTER
2023       RE: Doretha Yu  [address]  [facility] [address]     Dear Colleague,    Thank you for referring your patient, Doretha Yu, to the [facility] COLON AND RECTAL SURGERY CLINIC [place] [facility]. Please see a copy of my visit note below.    Colon and Rectal Surgery Postoperative Clinic Note    RE: Doretha Yu  : 1975  JUNIOR: 2023    Doretha Yu is a very pleasant 48 year old female with a history of ulcerative colitis with prior total abdominal colectomy with end ileostomy now status post robotic completion proctectomy with creation of J-pouch with IPAA, takedown of end ileostomy, and diverting loop ileostomy with Dr. Ram on 23.    Final Diagnosis   A. ILEUM, ILEOSTOMY TRIM;  Ileo-cutaneous anastomotic junction with nonspecific mucosa inflammation, congestion, and other features consistent with ileostomy; no dysplasia or malignancy    B. RECTUM, COMPLETION PROCTECTOMY:  Mildly active chronic proctitis with:   -Neutrophilic cryptitis, mucosal atrophy and prominent reactive lymphoid hyperplasias   -Reactive mesorectal lymph nodes;  no dysplasia or malignancy     C. COLON, ANASTOMOTIC RINGS; EXCISION:   -Portions of small intestinal mucosa and wall with no morphologic abnormalities   -Portion of rectal mucosa and wall with chronic proctitis; no dysplasia or malignancy      Interval history: Having a rough recovery for a number of reasons. Baseline anxiety is worsened because she has no home care and no one really to help at home. She had some pouch explosions and was finally able to see [facility] on Friday, which has helped a lot with this. She sees them again later today. She has passed a lot of bloody and mucous per rectum, but this has slowed down. Mostly just mucous now. She has pelvic pain that feels like a menstrual cramp. She is not taking imodium. She is following a low fiber gluten-free diet. Not measuring  output lately. Empties her bag 3-4 times during the day and 5-6 times at night.     Physical Examination:  /84 (BP Location: Right arm, Patient Position: Sitting, Cuff Size: Adult Large)   Pulse 114   LMP 08/01/2023 (Approximate)   SpO2 97%   General: alert, oriented, in no acute distress, sitting comfortably  HEENT: moist mucous membranes  Abdomen: Laparoscopic incisions all look well approximated. Takedown site on RUQ open, very shallow.    Assessment/Plan:  48 year old female with a history of ulcerative colitis with prior total abdominal colectomy with end ileostomy now status post robotic completion proctectomy with creation of J-pouch with IPAA, takedown of end ileostomy, and diverting loop ileostomy with Dr. Ram on 9/5/23. Having a rough recovery but medical looks fairly well. Start imodium a few times a day. Output was very watery today. This may also help decrease how much she gets up at night.  May transition to a regular diet. Avoid lifting >10 lb for another month. Scheduled to see Dr. Ram on 10/10/23. Discussed that she may have a flexible sigmoidoscopy and GGE before surgery. Patient's questions were answered to her stated satisfaction and she is in agreement with this plan.     Medical history:  Past Medical History:   Diagnosis Date    Abnormal MRI     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     Anxiety     Basal cell carcinoma     Cervical high risk HPV (human papillomavirus) test positive 12/13/2019    See problem list    Colitis     Depression     Diabetes mellitus, iatrogenic (H) 01/28/2020    Esophageal reflux     Inflammatory arthritis     Insomnia     Intestinal giardiasis 03/05/2018    Lumbago     left lower back pain    Lymphedema     Malignant melanoma (H)     Melanoma (H) 10/23/2017    Migraines     Mild persistent asthma     Morbid obesity with BMI of 40.0-44.9, adult (H)     STORMY (obstructive sleep apnea)     Prothrombin deficiency (H)     takes 81mg  asa daily    Stroke (cerebrum) (H)     During     TIA (transient ischemic attack)     Type 2 diabetes mellitus (H)     Ulcerative pancolitis (H)        Surgical history:  Past Surgical History:   Procedure Laterality Date    APPENDECTOMY      COLONOSCOPY N/A 10/18/2017    Procedure: COLONOSCOPY;  Colon;  Surgeon: Debbie Stephens MD;  [facility]    COLONOSCOPY N/A 2018    Procedure: COMBINED COLONOSCOPY, SINGLE OR MULTIPLE BIOPSY/POLYPECTOMY BY BIOPSY;  colon;  Surgeon: Benita Schumacher MD;  Location: [facility]    COLONOSCOPY      multiple since 2018 to present - about 6 total    DAVINCI ASSISTED TRANSANAL TOTAL MESORECTAL EXCISION N/A 2023    Procedure: COMPLETION PROCTECTOMY, ROBOT-ASSISTED, ILEAL POUCH ANASTAMOSIS;  Surgeon: Quinton Ram MD;  Location: [facility] OR    DISSECT LYMPH NODE AXILLA Left 10/23/2017    Procedure: DISSECT LYMPH NODE AXILLA;  Left Axillary Lymph Node Dissection ;  Surgeon: Laurent Cool MD;  Location: [facility]    EXAM UNDER ANESTHESIA PELVIC N/A 2020    Procedure: EXAM UNDER ANESTHESIA, PELVIS; with Cervical Biopsies, Vaginal Biopsy and Endocervical Curettings;  Surgeon: Melina Jung MD;  Location: [facility] OR    GYN SURGERY  ,         LAPAROSCOPIC ASSISTED COLECTOMY N/A 06/15/2021    Procedure: laparoscopic total abdominal colectomy, end ileostomy;  Surgeon: Quinton Ram MD;  Location: [facility]    REPAIR MOHS Left 2017    Procedure: REPAIR MOHS;  Left Upper Lid Moh's Reconstruction;  Surgeon: Kisha Bosch MD;  Location: [facility]    SIGMOIDOSCOPY FLEXIBLE N/A 2023    Procedure: Sigmoidoscopy flexible;  Surgeon: Quinton Ram MD;  Location: [facility]       Problem list:    Patient Active Problem List    Diagnosis Date Noted    Post-op pain 2023     Priority: Medium    Gastroesophageal reflux disease without esophagitis 2023     Priority: Medium    Biliary dyskinesia 2023     Priority:  Medium    Migraines      Priority: Medium    Colostomy in place (H) 07/16/2021     Priority: Medium    S/P colectomy 07/16/2021     Priority: Medium    Ulcerative colitis without complications, unspecified location (H) 06/03/2021     Priority: Medium    Dehydration 04/13/2021     Priority: Medium    Hematochezia 04/13/2021     Priority: Medium    Diarrhea of infectious origin 04/13/2021     Priority: Medium    C. difficile colitis 04/13/2021     Priority: Medium    Adjustment disorder with mixed emotional features 06/16/2020     Priority: Medium    Polyarthralgia 04/29/2020     Priority: Medium    Drug-induced polyneuropathy (H) 04/29/2020     Priority: Medium    Pain syndrome, chronic 02/12/2020     Priority: Medium    Drug-induced Cushing's syndrome (H) 02/12/2020     Priority: Medium    Diabetes mellitus, iatrogenic (H) 01/28/2020     Priority: Medium    High risk HPV infection 01/28/2020     Priority: Medium     Added automatically from request for surgery [id number]      Immunosuppression (H) 01/28/2020     Priority: Medium     Added automatically from request for surgery [id number]      STORMY (obstructive sleep apnea) 01/27/2020     Priority: Medium     1/2019 (241#)-AHI 24, lowest oxygen saturation was 86%, no periodic limb movement were noted, CPAP 8 cm/H20 was effective.      Secondary lymphedema 01/27/2020     Priority: Medium    Chronic neutrophilia 01/27/2020     Priority: Medium    Cervical high risk HPV (human papillomavirus) test positive 12/13/2019     Priority: Medium     2011 NIL pap.  2015 NIL pap, Neg HPV.  12/13/19 NIL pap , + HR HPV 16. Plan colp.   1/6/19 Failed colp exam secondary to anatomic constraints with gyn. Referred to gyn/onc.   2/25/20 Colpo bx and ECC Negative for dysplasia. Plan cotest in 1 year.   8/20/21 NIL pap, Neg HPV. Plan cotest in 1 year per provider.   9/28/22 Lost to follow-up for pap tracking       Immunosuppressed status (H) 09/05/2019     Priority: Medium    Ulcerative colitis  with complication, unspecified location (H) 09/05/2019     Priority: Medium    Morbid obesity (H) 09/05/2019     Priority: Medium    Arthritis, rheumatoid (H) 11/26/2018     Priority: Medium    Hypocalcemia 05/15/2018     Priority: Medium    Anemia 05/14/2018     Priority: Medium    Menstrual irregularity 05/14/2018     Priority: Medium    Arthralgia of both knees 05/12/2018     Priority: Medium     Formatting of this note might be different from the original.  Work-up in progress. There is concern for inflammatory arthritis.      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     Priority: Medium    Malignant melanoma of left upper extremity including shoulder (H) 10/12/2017     Priority: Medium    Metastatic malignant melanoma (H) 10/10/2017     Priority: Medium    Prothrombin mutation (H) 04/05/2017     Priority: Medium     On daily aspirin 81 mg per hematology's recommendations from 2008      Vision changes 04/01/2017     Priority: Medium    Mild intermittent asthma without complication 11/08/2013     Priority: Medium    Moderate major depression (H) 06/24/2013     Priority: Medium    Anxiety state 09/13/2012     Priority: Medium     Problem list name updated by automated process. Provider to review      Esophageal reflux 09/13/2012     Priority: Medium    DUB (dysfunctional uterine bleeding) 07/28/2011     Priority: Medium    CARDIOVASCULAR SCREENING; LDL GOAL LESS THAN 160 07/28/2011      Priority: Low       Medications:  Current Outpatient Medications   Medication Sig Dispense Refill    acetaminophen (TYLENOL) 325 MG tablet Take 3 tablets (975 mg) by mouth every 6 hours 100 tablet 0    albuterol (PROAIR HFA/PROVENTIL HFA/VENTOLIN HFA) 108 (90 Base) MCG/ACT inhaler Inhale 2 puffs into the lungs every 4 hours as needed for shortness of breath / dyspnea 18 g 1    famotidine (PEPCID) 20 MG tablet Take 1 tablet (20 mg) by mouth 2 times daily 60 tablet 0    hydrOXYzine (ATARAX) 25 MG tablet Take 1 tablet (25 mg) by mouth every 6 hours as needed for other (adjuvant pain) 15 tablet 0    medical cannabis (Patient's own supply) See Admin Instructions (The purpose of this order is to document that the patient reports taking medical cannabis.  This is not a prescription, and is not used to certify that the patient has a qualifying medical condition.)      menthol (ICY HOT) 5 % PTCH Apply 1 patch topically every 8 hours as needed for muscle soreness 10 patch 0    metFORMIN (GLUCOPHAGE) 500 MG tablet TAKE 2 TABLETS BY MOUTH 2 TIMES DAILY WITH MEALS (Patient taking differently: Take 500 mg by mouth 2 times daily (with meals) TAKE 2 TABLETS BY MOUTH 2 TIMES DAILY WITH MEALS) 360 tablet 1    methocarbamol (ROBAXIN) 750 MG tablet Take 1 tablet (750 mg) by mouth 4 times daily as needed for muscle spasms 40 tablet 0    ondansetron (ZOFRAN ODT) 4 MG ODT tab Take 1 tablet (4 mg) by mouth every 8 hours as needed for nausea or vomiting (Patient taking differently: Take 4 mg by mouth as needed for nausea or vomiting) 10 tablet 1    Ostomy Supplies MISC 20 each daily 20 each 11    oxyCODONE (ROXICODONE) 5 MG tablet Take 3 tablets (15 mg) by mouth every 4 hours 36 tablet 0    oxyCODONE IR (ROXICODONE) 5 MG tablet Take 2 tablets (10 mg) by mouth every 4 hours 60 tablet 0    phentermine (ADIPEX-P) 15 MG capsule Take 15 mg by mouth every morning      tolterodine ER (DETROL LA) 2 MG 24 hr capsule Take 1-2 capsules (2-4 mg) by  mouth daily 10 capsule 0    venlafaxine (EFFEXOR) 100 MG tablet Take 1 tablet (100 mg) by mouth 2 times daily Total of 150 mg bid 180 tablet 1    venlafaxine (EFFEXOR) 50 MG tablet Take 1 tablet (50 mg) by mouth 2 times daily Total of 150 mg twice daily 180 tablet 1       Allergies:  Allergies   Allergen Reactions    Bee Venom Swelling    Azithromycin Diarrhea    Erythromycin      Other reaction(s): GI intolerance, Vomiting    Fentanyl Other (See Comments)     sweating  sweating    Prochlorperazine Fatigue     Other reaction(s): Other (see comments)  Fatigue    Buspirone      Other reaction(s): GI intolerance  vomiting    Erythrocin Nausea and Vomiting    Gluten Meal      Celiac disease    Topamax [Topiramate]      Made her lethargic    Zithromax [Azithromycin Dihydrate] Diarrhea    Enbrel [Etanercept] Hives and Rash       Family history:  Family History   Problem Relation Age of Onset    Cancer Mother 45        lung    Neurologic Disorder Mother         epilepsy    Lipids Father     Gastrointestinal Disease Father         diverticulitis     Depression Father     Colitis Father     Colon Cancer Father     Diverticulitis Father     Depression Sister     Cancer Maternal Grandmother     Blood Disease Maternal Grandmother         lymphoma     Arthritis Maternal Grandmother     Diabetes Maternal Grandmother     Depression Maternal Grandmother     Macular Degeneration Maternal Grandmother     Glaucoma Maternal Grandmother     Diabetes Maternal Grandfather     Cerebrovascular Disease Maternal Grandfather     Blood Disease Maternal Grandfather     Heart Disease Maternal Grandfather     Glaucoma Maternal Grandfather     Cancer Paternal Grandmother     Cancer - colorectal Paternal Grandmother     Colitis Paternal Grandmother     Colon Cancer Paternal Grandmother     Diverticulitis Paternal Grandmother     Respiratory Paternal Grandfather         emphysema     Colitis Paternal Grandfather     Colon Cancer Paternal Grandfather      Diverticulitis Paternal Grandfather     Heart Disease Daughter     Asthma Daughter     Melanoma No family hx of     Anesthesia Reaction No family hx of     Clotting Disorder No family hx of        Social history:  Social History     Tobacco Use    Smoking status: Former     Packs/day: 1.00     Years: 5.00     Pack years: 5.00     Types: Cigarettes     Quit date: 3/20/1998     Years since quittin.5    Smokeless tobacco: Never   Substance Use Topics    Alcohol use: Not Currently     Marital status: .    Nursing Notes:   Tariq Shelby, EMT  2023 11:06 AM  Signed  Chief Complaint   Patient presents with    Post-op Visit       Vitals:    23 1104   BP: 119/84   BP Location: Right arm   Patient Position: Sitting   Cuff Size: Adult Large   Pulse: 114   SpO2: 97%       There is no height or weight on file to calculate BMI.    Tariq Shelby EMT-P       20 minutes spent on the date of the encounter doing chart review, history and exam, documentation and further activities as noted above.   This is a postop visit.          Again, thank you for allowing me to participate in the care of your patient.      Sincerely,    Mariela Crandall PA-C

## 2023-09-21 ENCOUNTER — TELEPHONE (OUTPATIENT)
Dept: SURGERY | Facility: CLINIC | Age: 48
End: 2023-09-21
Payer: COMMERCIAL

## 2023-09-21 NOTE — TELEPHONE ENCOUNTER
Phani Troncoso, EMT-P faxed orders for pt from [facility] yesterday 9/20/2023. Will refax order over again today.       Tiffanie Naidu, CMA

## 2023-09-21 NOTE — PROGRESS NOTES
Colon and Rectal Surgery Clinic Note    RE: Doretha Yu.  : 1975.  JUNIOR: 10/10/2023.    Reason for visit: post op    HPI: Doretha Fernandez is a 46 year old female with ulcerative colitis. She presents today for a post op visit.  On 6/15/2021 I took her to the OR for a TAC with end ileostomy (laparoscopic). Her upper GI on 10/4/2022 showed mild chronic inflammation in the gastric antrum and LA grade A esophagitis with no bleeding in the gastroesophageal junction. Her MRE in 2022 showed a subtotal colectomy with right paramedian mid abdominal ileostomy and long Mejía's pouch. No findings specific for acute inflammation of the bowel. Multiple prominent pericolonic/perirectal lymph nodes, similar to comparison. Fat-containing noninflamed parastomal hernia. Pancreatic atrophy.    She is now s/p robotic completion proctectomy with creation of J-pouch with IPAA, takedown of end ileostomy, and diverting loop ileostomy 23.      Interval History: She has been having mucous per pouch, but no longer any bleeding.  She is having nothing per vagina.  She has complete control of her bladder now.  She says her pain is crampy, abdominal pain.  It is mostly all over but sometimes over her pelvis.  She is having some nausea and is very fatigued.  She empties her pouch 6-7 times per day.  It is thin liquid.  She is not measuring her output.     Surgical Pathology (2023):  A. ILEUM, ILEOSTOMY TRIM;  Ileo-cutaneous anastomotic junction with nonspecific mucosa inflammation, congestion, and other features consistent with ileostomy; no dysplasia or malignancy  B. RECTUM, COMPLETION PROCTECTOMY:  Mildly active chronic proctitis with:   -Neutrophilic cryptitis, mucosal atrophy and prominent reactive lymphoid hyperplasias   -Reactive mesorectal lymph nodes;  no dysplasia or malignancy     C. COLON, ANASTOMOTIC RINGS; EXCISION:   -Portions of small intestinal mucosa and wall with no morphologic abnormalities   -Portion  of rectal mucosa and wall with chronic proctitis; no dysplasia or malignancy       Medications:  Current Outpatient Medications   Medication Sig Dispense Refill    acetaminophen (TYLENOL) 325 MG tablet Take 3 tablets (975 mg) by mouth every 6 hours 100 tablet 0    albuterol (PROAIR HFA/PROVENTIL HFA/VENTOLIN HFA) 108 (90 Base) MCG/ACT inhaler Inhale 2 puffs into the lungs every 4 hours as needed for shortness of breath / dyspnea 18 g 1    famotidine (PEPCID) 20 MG tablet Take 1 tablet (20 mg) by mouth 2 times daily 60 tablet 0    hydrOXYzine (ATARAX) 25 MG tablet Take 1 tablet (25 mg) by mouth every 6 hours as needed for other (adjuvant pain) 15 tablet 0    loperamide (IMODIUM A-D) 2 MG tablet Take 1 tablet (2 mg) by mouth 4 times daily as needed for other (high ileostomy output) 120 tablet 1    medical cannabis (Patient's own supply) See Admin Instructions (The purpose of this order is to document that the patient reports taking medical cannabis.  This is not a prescription, and is not used to certify that the patient has a qualifying medical condition.)      menthol (ICY HOT) 5 % PTCH Apply 1 patch topically every 8 hours as needed for muscle soreness 10 patch 0    metFORMIN (GLUCOPHAGE) 500 MG tablet TAKE 2 TABLETS BY MOUTH 2 TIMES DAILY WITH MEALS (Patient taking differently: Take 500 mg by mouth 2 times daily (with meals) TAKE 2 TABLETS BY MOUTH 2 TIMES DAILY WITH MEALS) 360 tablet 1    methocarbamol (ROBAXIN) 750 MG tablet Take 1 tablet (750 mg) by mouth 4 times daily as needed for muscle spasms 40 tablet 0    ondansetron (ZOFRAN ODT) 4 MG ODT tab Take 1 tablet (4 mg) by mouth every 8 hours as needed for nausea or vomiting (Patient taking differently: Take 4 mg by mouth as needed for nausea or vomiting) 10 tablet 1    Ostomy Supplies MISC 20 each daily 20 each 11    oxyCODONE (ROXICODONE) 5 MG tablet Take 3 tablets (15 mg) by mouth every 4 hours 36 tablet 0    oxyCODONE IR (ROXICODONE) 5 MG tablet Take 2  tablets (10 mg) by mouth every 4 hours 60 tablet 0    phentermine (ADIPEX-P) 15 MG capsule Take 15 mg by mouth every morning      tolterodine ER (DETROL LA) 2 MG 24 hr capsule Take 1-2 capsules (2-4 mg) by mouth daily 10 capsule 0    venlafaxine (EFFEXOR) 100 MG tablet Take 1 tablet (100 mg) by mouth 2 times daily Total of 150 mg bid 180 tablet 1    venlafaxine (EFFEXOR) 50 MG tablet Take 1 tablet (50 mg) by mouth 2 times daily Total of 150 mg twice daily 180 tablet 1       ROS:  A complete review of systems was performed with the patient and all systems negative except as per HPI.    Physical Examination:  LMP 08/01/2023 (Approximate)   General: Well hydrated. No acute distress.  Abdomen: Soft, NT, not distended. Incisions sites are clean and healed; former ileostomy site is healed; loop ileostomy in place and with pouch in place      ASSESSMENT  49 y/o lady with UC s/p robotic proctectomy, IPAA, and DLI.     Risks, benefits, and alternatives of operative treatment were thoroughly discussed with the patient, he/she understands these well and agrees to proceed.    PLAN  1. She needs to start measuring output from ileostomy and recording it;   2. Increase loperamide to qid  3. Start lomotil 1 tab bid - this may need to be titrated up  4. Follow up in my December clinic for pouchoscopy  5. GGE prior to pouchoscopy  6. Labs today to assess for dehydration    30 minutes spent on the date of the encounter doing chart review, history and exam, imaging review, documentation and further activities as noted above.    Quinton Ram MD, PhD    Division of Colon and Rectal Surgery  [facility]    Referring Provider:  Quinton Ram MD  [address] [place] [id number]     Primary Care Provider:  Anna Naidu

## 2023-09-21 NOTE — TELEPHONE ENCOUNTER
[facility] Call Center    Phone Message    May a detailed message be left on voicemail: yes     Reason for Call: Other: Please fax wound care order form back signed.     Action Taken: Message routed to:  [facility] crs    Travel Screening: Not Applicable

## 2023-09-22 ENCOUNTER — DOCUMENTATION ONLY (OUTPATIENT)
Dept: SURGERY | Facility: CLINIC | Age: 48
End: 2023-09-22
Payer: COMMERCIAL

## 2023-09-22 ENCOUNTER — NURSE TRIAGE (OUTPATIENT)
Dept: CALL CENTER | Age: 48
End: 2023-09-22
Payer: COMMERCIAL

## 2023-09-22 DIAGNOSIS — K62.89 RECTAL IRRITATION: Primary | ICD-10-CM

## 2023-09-22 DIAGNOSIS — G89.18 ACUTE POST-OPERATIVE PAIN: ICD-10-CM

## 2023-09-22 RX ORDER — OXYCODONE HYDROCHLORIDE 5 MG/1
5-10 TABLET ORAL EVERY 6 HOURS PRN
Qty: 40 TABLET | Refills: 0 | Status: SHIPPED | OUTPATIENT
Start: 2023-09-22 | End: 2023-10-31

## 2023-09-22 RX ORDER — ANORECTAL OINTMENT 15.7; .44; 24; 20.6 G/100G; G/100G; G/100G; G/100G
OINTMENT TOPICAL 4 TIMES DAILY PRN
Qty: 71 G | Refills: 0 | Status: SHIPPED | OUTPATIENT
Start: 2023-09-22 | End: 2023-10-31

## 2023-09-22 NOTE — TELEPHONE ENCOUNTER
Pt calling w/ concern of new burning pain:     Last  48-24 hours, retcum burning and iliosotomy burning on the inside  #7, as time goes it is getting worse, more intensified.    Took last 2 oxycodone  Taking tylenol, cannibus pen, pepcid- not helping  Pt concerned - new burning pain, ? Rx for meds, other tx/recommendation    9-5-23  PROCEDURES PERFORMED:    1.  Laparoscopic robotic-assisted completion proctectomy.  2.  Takedown of end ileostomy  3.  Creation of ileal J-pouch with ileoanal anastomosis   4.  Flexible pouchoscopy/sigmoidoscopy.  5.  Diverting loop ileostomy creation      SURGEON:  Quinton Ram MD, PhD.    Pt # [phone]    Pharm open til 5PM, [facility]  Pharm after 5PM    [facility] - [place], [place] - [address]       High priority note to [facility] - pt call back w/ plan- recommendation    Reason for Disposition   Caller has URGENT question and triager unable to answer question    Additional Information   Negative: Sounds like a life-threatening emergency to the triager   Negative: Chest pain   Negative: Difficulty breathing   Negative: Acting confused (e.g., disoriented, slurred speech) or excessively sleepy   Negative: Surgical incision symptoms and questions   Negative: Pain or burning with passing urine (urination) and male   Negative: Pain or burning with passing urine (urination) and female   Negative: Constipation   Negative: New or worsening leg (calf, thigh) pain   Negative: New or worsening leg swelling   Negative: Dizziness is severe, or persists > 24 hours after surgery   Negative: Symptoms arising from use of a urinary catheter (Major or Coude)   Negative: Cast problems or questions   Negative: Medication question   Negative: Bright red, wide-spread, sunburn-like rash   Negative: SEVERE headache and after spinal (epidural) anesthesia   Negative: Vomiting and persists > 4 hours   Negative: Vomiting and abdomen looks much more swollen than  "usual   Negative: Drinking very little and dehydration suspected (e.g., no urine > 12 hours, very dry mouth, very lightheaded)   Negative: Patient sounds very sick or weak to the triager   Negative: Sounds like a serious complication to the triager   Negative: Fever > 100.4 F (38.0 C)    Answer Assessment - Initial Assessment Questions  1. SYMPTOM: \"What's the main symptom you're concerned about?\" (e.g., pain, fever, vomiting)        Last  48-24 hours, retcum burning and iliosotomy burning inside  #7, as time goes it is getting worse, more intensified.    Took last 2 oxycodone  Taking tylenol, cannibus pen, pepcid- not helping  Pt concerned - new burning pain, ? Rx for meds, other tx/recommendation    2. ONSET: \"When did   start?\"      Last 24-48 hours      3. SURGERY: \"What surgery did you have?\"        PROCEDURES PERFORMED:    1.  Laparoscopic robotic-assisted completion proctectomy.  2.  Takedown of end ileostomy  3.  Creation of ileal J-pouch with ileoanal anastomosis   4.  Flexible pouchoscopy/sigmoidoscopy.  5.  Diverting loop ileostomy creation        SURGEON:  Quinton Ram MD, PhD.  4. DATE of SURGERY: \"When was the surgery?\"       9-5-23  5. ANESTHESIA: \" What type of anesthesia did you have?\" (e.g., general, spinal, epidural, local)        6. PAIN: \"Is there any pain?\" If Yes, ask: \"How bad is it?\"  (Scale 1-10; or mild, moderate, severe)        7. FEVER: \"Do you have a fever?\" If Yes, ask: \"What is your temperature, how was it measured, and when did it start?\"      No  Has had sweats  8. VOMITING: \"Is there any vomiting?\" If Yes, ask: \"How many times?\"      no  9. BLEEDING: \"Is there any bleeding?\" If Yes, ask: \"How much?\" and \"Where?\"      Rectum little    10. OTHER SYMPTOMS: \"Do you have any other symptoms?\" (e.g., drainage from wound, painful urination, constipation)        Stool via rectum, soft-liquid, today  Ileostomy-liquid today    Protocols used: Post-Op Symptoms and " [id number]

## 2023-09-22 NOTE — PROGRESS NOTES
FMLA Paper Work    - FMLA paperwork received 09/22/2023    - Surgery Date: 09/05/2023    - First Post-Op Date: 09/20/2023    - Second Post-Op Date: 10/10/2023    - Filled Out: 09/22/2023    - Restrictions: No pushing, pulling or lifting anything over 10 lbs for 6 weeks    - Expected Return to Work Date: 10/17/2023    - Signed by Provider: Dr. Quinton Ram    - Faxed to: (Fax Number & Company) [employer] [phone]    - Copy sent to scanning and original in file folder in Clinic.     Tiffanie Naidu, CMA

## 2023-09-22 NOTE — TELEPHONE ENCOUNTER
"Doretha is s/p IPAA and DLI on 9/5. Calling in with ileostomy burning \"on the inside\" and rectal burning. She has been having mucous and blood via rectum which could be causing excessive moisture to the skin. She also has parastomal irritation. Denies blood in stool. She is taking tylenol, oxy, weed pen, and pepcid.     Plan for oxycodone refill and start Calmoseptine cream for rectal burning. Discussed with Dr. Ram.   "

## 2023-10-02 ENCOUNTER — PATIENT OUTREACH (OUTPATIENT)
Dept: SURGERY | Facility: CLINIC | Age: 48
End: 2023-10-02
Payer: COMMERCIAL

## 2023-10-02 ENCOUNTER — TELEPHONE (OUTPATIENT)
Dept: SURGERY | Facility: CLINIC | Age: 48
End: 2023-10-02
Payer: COMMERCIAL

## 2023-10-02 DIAGNOSIS — G89.18 ACUTE POST-OPERATIVE PAIN: ICD-10-CM

## 2023-10-02 DIAGNOSIS — K51.011 ULCERATIVE PANCOLITIS WITH RECTAL BLEEDING (H): ICD-10-CM

## 2023-10-02 RX ORDER — METHOCARBAMOL 750 MG/1
750 TABLET, FILM COATED ORAL 4 TIMES DAILY PRN
Qty: 40 TABLET | Refills: 0 | Status: ON HOLD | OUTPATIENT
Start: 2023-10-02 | End: 2023-12-28

## 2023-10-02 NOTE — PROGRESS NOTES
Received a message from Doretha stating that she is still having some abdominal cramping. She is requesting a refill on robaxin. She is still having mucous drainage per rectum which I assured her this is normal. She is having gas per rectum.     Discussed with Dr. Ram. Ok for robaxin refill and no concern with gas per rectum.

## 2023-10-03 DIAGNOSIS — G89.18 ACUTE POST-OPERATIVE PAIN: ICD-10-CM

## 2023-10-03 DIAGNOSIS — F32.9 CURRENT EPISODE OF MAJOR DEPRESSIVE DISORDER WITHOUT PRIOR EPISODE, UNSPECIFIED DEPRESSION EPISODE SEVERITY: ICD-10-CM

## 2023-10-03 RX ORDER — VENLAFAXINE 50 MG/1
50 TABLET ORAL 2 TIMES DAILY
Qty: 180 TABLET | Refills: 1 | OUTPATIENT
Start: 2023-10-03

## 2023-10-03 RX ORDER — VENLAFAXINE 75 MG/1
150 TABLET ORAL 2 TIMES DAILY
Qty: 360 TABLET | Refills: 0 | Status: SHIPPED | OUTPATIENT
Start: 2023-10-03 | End: 2024-01-18

## 2023-10-03 NOTE — TELEPHONE ENCOUNTER
Venlafaxine resent fill.     Hydroxyzine surgical team review - not on protocol for pain.     Jennifer Yeh MSN, RN

## 2023-10-03 NOTE — TELEPHONE ENCOUNTER
FYI - Status Med Update    Who is Calling: patient    Update: Pt calling with 2 urgent Rx refills - Pt is OUT OF BOTH MEDS and needs Rxs sent to [facility] ASAP.    Venlafaxine (EFFEXOR) 75mg - Pt takes 2 tabs 2 times daily - Wants 90-day supply refill  HydrOXYzine (ATARAX) 25 MG tablet - Pt takes 1 tab 2 times daily   Please call patient and advise - Pt wants call back TODAY.      Does caller want a call/response back: Yes     Could we send this information to you in "[employer] or would you prefer to receive a phone call?:   Patient would prefer a phone call   Okay to leave a detailed message?: Yes at Cell number on file:    Telephone Information:   Mobile [phone]

## 2023-10-03 NOTE — TELEPHONE ENCOUNTER
Patient has called indicating she should be on the venlafaxine 75mg bid. I did not see that on her med list here, but she has received that in the past with previous fills. Patient is out of med.

## 2023-10-03 NOTE — TELEPHONE ENCOUNTER
Pt updated that venlafaxine was resent to pharmacy and hydroxyzine sent to surgical team to review.     Jennifer MARR

## 2023-10-04 ENCOUNTER — HOSPITAL ENCOUNTER (OUTPATIENT)
Dept: WOUND CARE | Facility: CLINIC | Age: 48
Discharge: HOME | End: 2023-10-04
Attending: FAMILY MEDICINE | Admitting: FAMILY MEDICINE
Payer: COMMERCIAL

## 2023-10-04 PROCEDURE — G0463 HOSPITAL OUTPT CLINIC VISIT: HCPCS

## 2023-10-04 RX ORDER — HYDROXYZINE HYDROCHLORIDE 25 MG/1
25 TABLET, FILM COATED ORAL 2 TIMES DAILY
Qty: 60 TABLET | Refills: 1 | Status: SHIPPED | OUTPATIENT
Start: 2023-10-04 | End: 2024-07-15

## 2023-10-04 NOTE — PROGRESS NOTES
"[facility]    Start of Care in [facility]: 9/15/2023   Referring Doctor: Judi PARKER  Primary Care Provider: Anna Naidu   Wound Location: abdomen - ostomy takedown site   Type of Ostomy/Surgery Date/Surgeon: loop ileostomy (second stage of IPAA J pouch surgery), 9/5/23, Dr. Ram    Reason for Visit: ileostomy recheck     Subjective: Pt states things have been \"going pretty well\" except for the last 2 pouch changes. She applied a pouch on Sunday and then this was leaking and had to be changed around 0330 today and then leaked again around 1000. Her stool output has become liquid over the last week. She realizes when talking with this writer that she had root beer yesterday for the first time and thinks that may have led to the issues overnight. She states the area around her stoma is a lot more red than it's ever been. She's wondering if her stoma has gotten smaller.    She received her supplies from [employer].     Pt arrived independently.    HPI/Pertinent information from chart review:  From hospital discharge:  This is a 48 year old female with complex medical history to include ulcerative colitis s/p TAC with end ileostomy (laparoscopic) on 6/15/21 with subsequent parastomal hernia, inflammatory arthritis secondary to immunotherapy, chronic prednisone use, type 2 diabetes, obesity, history of CVA in 2004 after giving birth, prothrombin deficiency, obstructive sleep apnea, asthma, lymphedema, lumbago, chronic pain, depression, anxiety and insomnia. She is now s/p robotic proctectomy, ileostomy takedown, and creation of ileal J-pouch.     There was some concern regarding possible ileovaginal fistula based on thick brown/red vaginal drainage, though this was thought to be less likely based on operative and exam findings. If there continues to be ongoing concerns of drainage of stool or succus from the vagina, we would recommend vaginal contrast vs " pouchogram to further assess the presence of a fistula.     1.  Laparoscopic robotic-assisted completion proctectomy.  2.  Takedown of end ileostomy  3.  Creation of ileal J-pouch with ileoanal anastomosis   4.  Flexible pouchoscopy/sigmoidoscopy.  5.  Diverting loop ileostomy creation     Past Medical History:  Patient Active Problem List   Diagnosis    CARDIOVASCULAR SCREENING; LDL GOAL LESS THAN 160    DUB (dysfunctional uterine bleeding)    Anxiety state    Esophageal reflux    Moderate major depression (H)    Mild intermittent asthma without complication    Vision changes    Prothrombin mutation (H24)    Metastatic malignant melanoma (H)    Malignant melanoma of left upper extremity including shoulder (H)    Functional diarrhea    Colitis    Rectal bleeding    Bilateral leg cramps    Rash and nonspecific skin eruption    Other chronic pain    Immunosuppressed status (H24)    Ulcerative colitis with complication, unspecified location (H)    Morbid obesity (H)    Cervical high risk HPV (human papillomavirus) test positive    STORMY (obstructive sleep apnea)    Secondary lymphedema    Chronic neutrophilia    Diabetes mellitus, iatrogenic (H)    High risk HPV infection    Abdominal pain    Anemia    Candidiasis of mouth    Hypocalcemia    Malaise    Menstrual irregularity    Arthritis, rheumatoid (H)    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 difficile colitis    Ulcerative colitis without complications, unspecified location (H)    Colostomy in place (H)    S/P colectomy    Polyarthralgia    Drug-induced polyneuropathy (H24)    Arthralgia of both knees    Adjustment disorder with mixed emotional features    Migraines    Biliary dyskinesia    Gastroesophageal reflux disease without esophagitis    Post-op pain     "                 Tobacco Use:     Tobacco Use      Smoking status: Former        Packs/day: 1.00        Years: 5.00        Pack years: 5        Types: Cigarettes        Quit date: 3/20/1998        Years since quittin.5      Smokeless tobacco: Never       Diabetic: yes  HgbA1C:   Hemoglobin A1C   Date Value Ref Range Status   2023 6.3 (H) 0.0 - 5.6 % Final     Comment:     Normal <5.7%   Prediabetes 5.7-6.4%    Diabetes 6.5% or higher     Note: Adopted from ADA consensus guidelines.   2021 7.5 (H) 0 - 5.6 % Final     Comment:     Normal <5.7% Prediabetes 5.7-6.4%  Diabetes 6.5% or higher - adopted from ADA   consensus guidelines.       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 pt     Participants in cares today: pt    Any limitations (dexterity, vision, hearing)? no     No new photo  Location: RLQ  Color/Moisture: red, moist  Viable: yes  Size: 1\" - measuring smaller  Shape: round  MCJ: intact with sutures; removed some sutures that haven't dissolved using Adsons/scissors to strip  Os: 2 - both are on the apex; with the shape of the stoma the 2 stomas fold in toward one another  Protrusion: fluctuates from flush to up to 2cm  Peristomal skin: intact erythema circumferential from stoma extending about 1cm and diffuse pink where barrier strips were applied; possibly some satellite lesions? Pt is voicing itching  Output: liquid green brown  Abdominal profile/plane: stoma sinks into abdomen with sitting, standing, leaning; area around stoma is soft so appropriate for deep " "convexity (assessed last visit)      WOUND EXAM  Closed with new epithelium, some loose hyperkeratosis      Assessment:  Loop ileostomy with minimal protrusion, unusual postioning of os's, and soft abdomen - deep convex is working well but did have 2 leaks in <12 hours after having root beer yesterday  Stoma measuring smaller but still cutting pouch larger than stoma, effluent making contact with skin so skin with bright erythema but no denudements circumferentially  Skin sensitivity or fungal-like rash where elastic skin barrier strips were   Prior ostomy site closed with some loose hyperkeratosis  Pt with minimal support at home (high school daughter), no home care available to see pt    Plan of Care:  Ostomy: Continuing antifungal powder to crust, deep convex one-piece Coloplast SenSura Thanh pouch 06745 with smaller template (since stoma is measuring 1\"), Sarah ring, belt, Coloplast Elastic Barrier Strips. Added a partial Sarah ring to the 10:00 area in line with her umbilicus where there is a dip in the peristomal plane causing the pouch to not adhere well. Continuing with Imodium and can increase up to 8 tablets/day    Wound: Aquaphor to site twice daily to promote maturation      Discussion/Education:  plan of care with rationale; Imodium - to increase and can take up to 8/day (only taking 3/day at this time); eating marshmallows or peanut butter (or both) prior to going to sleep to help thicken stool; pt is able to perform cares     Cares Performed:  Pouch change per above plan of care    Topical care: none      The following discharge instructions were reviewed with and sent home with the patient (prior; no new today):  See prior AVS    The following supplies were sent home with the patient:  none    Return visit: 1 week - can cancel if erythema has improved with smaller template and pouching is stable (is scheduled weekly until next surgery)    Verbal, written (prior), & demonstrative education " provided.  Face to face time: approximately 40 minutes  Procedure: none    Florida Huffman RN, CWOCN  [phone]

## 2023-10-05 ENCOUNTER — PATIENT OUTREACH (OUTPATIENT)
Dept: CARE COORDINATION | Facility: CLINIC | Age: 48
End: 2023-10-05
Payer: COMMERCIAL

## 2023-10-05 ASSESSMENT — ACTIVITIES OF DAILY LIVING (ADL): DEPENDENT_IADLS:: INDEPENDENT

## 2023-10-05 NOTE — PROGRESS NOTES
Clinic Care Coordination Contact  [facility]/Voicemail    Referral Source: PCP referral 9/5/2023  We were unable to secure home care for this patient. I have spoken with someone in the clinic and have patient scheduled for wound care with primary clinic.     9/14/2023  Post-op Problem        Pt was recently released from [facility] for colon surgery, pt had an ileostomy take down and another one placed. Pt reports she was having urinary incontinence and and leaking form her rectum. Pt was seen at the [facility], saw by her surgeon, was to be admitted. Pt left AMA after waiting 12 hours and no one had checked on her. Pt reports she continues to have diffuse pain. Pt reports now having some blood in her urine     Clinical Data:   Care Coordinator Outreach  Outreach attempted x 1.  Left message on patient's voicemail with call back information and requested return call.  Plan: . Care Coordinator will try to reach patient again in 3-5 business days.    [facility]   Gisel Marte RN, Care Coordinator   [facility]'s   E-mail [email or web address]   [phone]

## 2023-10-05 NOTE — PROGRESS NOTES
Called patient and she is on a Zoom call and requests a call in 30 minutes     [facility]   Gisel Marte RN, Care Coordinator   [facility]'s   E-mail [email or web address]   [phone]

## 2023-10-10 ENCOUNTER — OFFICE VISIT (OUTPATIENT)
Dept: SURGERY | Facility: CLINIC | Age: 48
End: 2023-10-10
Payer: COMMERCIAL

## 2023-10-10 ENCOUNTER — LAB (OUTPATIENT)
Dept: LAB | Facility: CLINIC | Age: 48
End: 2023-10-10
Payer: COMMERCIAL

## 2023-10-10 ENCOUNTER — TELEPHONE (OUTPATIENT)
Facility: CLINIC | Age: 48
End: 2023-10-10

## 2023-10-10 VITALS
OXYGEN SATURATION: 100 % | DIASTOLIC BLOOD PRESSURE: 74 MMHG | HEART RATE: 107 BPM | BODY MASS INDEX: 35.57 KG/M2 | HEIGHT: 62 IN | WEIGHT: 193.3 LBS | SYSTOLIC BLOOD PRESSURE: 103 MMHG

## 2023-10-10 DIAGNOSIS — R19.7 DIARRHEA, UNSPECIFIED TYPE: Primary | ICD-10-CM

## 2023-10-10 DIAGNOSIS — Z93.2 HIGH OUTPUT ILEOSTOMY (H): ICD-10-CM

## 2023-10-10 DIAGNOSIS — Z09 FOLLOW-UP EXAMINATION AFTER COLORECTAL SURGERY: ICD-10-CM

## 2023-10-10 DIAGNOSIS — R19.8 HIGH OUTPUT ILEOSTOMY (H): ICD-10-CM

## 2023-10-10 DIAGNOSIS — Z93.2 ILEOSTOMY IN PLACE (H): Primary | ICD-10-CM

## 2023-10-10 DIAGNOSIS — R11.0 NAUSEA: ICD-10-CM

## 2023-10-10 LAB
ANION GAP SERPL CALCULATED.3IONS-SCNC: 15 MMOL/L (ref 7–15)
BUN SERPL-MCNC: 12.8 MG/DL (ref 6–20)
CALCIUM SERPL-MCNC: 10.2 MG/DL (ref 8.6–10)
CHLORIDE SERPL-SCNC: 102 MMOL/L (ref 98–107)
CREAT SERPL-MCNC: 0.85 MG/DL (ref 0.51–0.95)
DEPRECATED HCO3 PLAS-SCNC: 19 MMOL/L (ref 22–29)
EGFRCR SERPLBLD CKD-EPI 2021: 85 ML/MIN/1.73M2
GLUCOSE SERPL-MCNC: 128 MG/DL (ref 70–99)
MAGNESIUM SERPL-MCNC: 2.1 MG/DL (ref 1.7–2.3)
PHOSPHATE SERPL-MCNC: 3.8 MG/DL (ref 2.5–4.5)
POTASSIUM SERPL-SCNC: 4.1 MMOL/L (ref 3.4–5.3)
SODIUM SERPL-SCNC: 136 MMOL/L (ref 135–145)

## 2023-10-10 PROCEDURE — 83735 ASSAY OF MAGNESIUM: CPT | Performed by: PATHOLOGY

## 2023-10-10 PROCEDURE — 84100 ASSAY OF PHOSPHORUS: CPT | Performed by: PATHOLOGY

## 2023-10-10 PROCEDURE — 99024 POSTOP FOLLOW-UP VISIT: CPT | Performed by: SURGERY

## 2023-10-10 PROCEDURE — 80048 BASIC METABOLIC PNL TOTAL CA: CPT | Performed by: PATHOLOGY

## 2023-10-10 PROCEDURE — 36415 COLL VENOUS BLD VENIPUNCTURE: CPT | Performed by: PATHOLOGY

## 2023-10-10 RX ORDER — LOPERAMIDE HYDROCHLORIDE 2 MG/1
TABLET ORAL
Qty: 240 TABLET | Refills: 1 | Status: SHIPPED | OUTPATIENT
Start: 2023-10-10 | End: 2023-10-31

## 2023-10-10 RX ORDER — DIPHENOXYLATE HCL/ATROPINE 2.5-.025MG
1 TABLET ORAL 2 TIMES DAILY
Qty: 60 TABLET | Refills: 2 | Status: CANCELLED | OUTPATIENT
Start: 2023-10-10

## 2023-10-10 RX ORDER — ONDANSETRON 4 MG/1
4 TABLET, ORALLY DISINTEGRATING ORAL EVERY 8 HOURS PRN
Qty: 20 TABLET | Refills: 0 | Status: SHIPPED | OUTPATIENT
Start: 2023-10-10 | End: 2023-10-31

## 2023-10-10 RX ORDER — DIPHENOXYLATE HCL/ATROPINE 2.5-.025MG
1 TABLET ORAL 2 TIMES DAILY
Qty: 60 TABLET | Refills: 2 | Status: ON HOLD | OUTPATIENT
Start: 2023-10-10 | End: 2023-11-08

## 2023-10-10 ASSESSMENT — PAIN SCALES - GENERAL: PAINLEVEL: SEVERE PAIN (7)

## 2023-10-10 NOTE — LETTER
10/10/2023       RE: Doretha Yu  [address]  [place] [address]       Dear Colleague,    Thank you for referring your patient, Doretha Yu, to the [facility] [place] at [facility]. Please see a copy of my visit note below.    Colon and Rectal Surgery Clinic Note    RE: Doretha Yu.  : 1975.  JUNIOR: 10/10/2023.    Reason for visit: post op    HPI: Doretha Fernandez is a 46 year old female with ulcerative colitis. She presents today for a post op visit.  On 6/15/2021 I took her to the OR for a TAC with end ileostomy (laparoscopic). Her upper GI on 10/4/2022 showed mild chronic inflammation in the gastric antrum and LA grade A esophagitis with no bleeding in the gastroesophageal junction. Her MRE in 2022 showed a subtotal colectomy with right paramedian mid abdominal ileostomy and long Mejía's pouch. No findings specific for acute inflammation of the bowel. Multiple prominent pericolonic/perirectal lymph nodes, similar to comparison. Fat-containing noninflamed parastomal hernia. Pancreatic atrophy.    She is now s/p robotic completion proctectomy with creation of J-pouch with IPAA, takedown of end ileostomy, and diverting loop ileostomy 23.      Interval History: She has been having mucous per pouch, but no longer any bleeding.  She is having nothing per vagina.  She has complete control of her bladder now.  She says her pain is crampy, abdominal pain.  It is mostly all over but sometimes over her pelvis.  She is having some nausea and is very fatigued.  She empties her pouch 6-7 times per day.  It is thin liquid.  She is not measuring her output.     Surgical Pathology (2023):  A. ILEUM, ILEOSTOMY TRIM;  Ileo-cutaneous anastomotic junction with nonspecific mucosa inflammation, congestion, and other features consistent with ileostomy; no dysplasia or malignancy  B. RECTUM, COMPLETION  PROCTECTOMY:  Mildly active chronic proctitis with:   -Neutrophilic cryptitis, mucosal atrophy and prominent reactive lymphoid hyperplasias   -Reactive mesorectal lymph nodes;  no dysplasia or malignancy     C. COLON, ANASTOMOTIC RINGS; EXCISION:   -Portions of small intestinal mucosa and wall with no morphologic abnormalities   -Portion of rectal mucosa and wall with chronic proctitis; no dysplasia or malignancy       Medications:  Current Outpatient Medications   Medication Sig Dispense Refill    acetaminophen (TYLENOL) 325 MG tablet Take 3 tablets (975 mg) by mouth every 6 hours 100 tablet 0    albuterol (PROAIR HFA/PROVENTIL HFA/VENTOLIN HFA) 108 (90 Base) MCG/ACT inhaler Inhale 2 puffs into the lungs every 4 hours as needed for shortness of breath / dyspnea 18 g 1    famotidine (PEPCID) 20 MG tablet Take 1 tablet (20 mg) by mouth 2 times daily 60 tablet 0    hydrOXYzine (ATARAX) 25 MG tablet Take 1 tablet (25 mg) by mouth every 6 hours as needed for other (adjuvant pain) 15 tablet 0    loperamide (IMODIUM A-D) 2 MG tablet Take 1 tablet (2 mg) by mouth 4 times daily as needed for other (high ileostomy output) 120 tablet 1    medical cannabis (Patient's own supply) See Admin Instructions (The purpose of this order is to document that the patient reports taking medical cannabis.  This is not a prescription, and is not used to certify that the patient has a qualifying medical condition.)      menthol (ICY HOT) 5 % PTCH Apply 1 patch topically every 8 hours as needed for muscle soreness 10 patch 0    metFORMIN (GLUCOPHAGE) 500 MG tablet TAKE 2 TABLETS BY MOUTH 2 TIMES DAILY WITH MEALS (Patient taking differently: Take 500 mg by mouth 2 times daily (with meals) TAKE 2 TABLETS BY MOUTH 2 TIMES DAILY WITH MEALS) 360 tablet 1    methocarbamol (ROBAXIN) 750 MG tablet Take 1 tablet (750 mg) by mouth 4 times daily as needed for muscle spasms 40 tablet 0    ondansetron (ZOFRAN ODT) 4 MG ODT tab Take 1 tablet (4 mg) by mouth  every 8 hours as needed for nausea or vomiting (Patient taking differently: Take 4 mg by mouth as needed for nausea or vomiting) 10 tablet 1    Ostomy Supplies MISC 20 each daily 20 each 11    oxyCODONE (ROXICODONE) 5 MG tablet Take 3 tablets (15 mg) by mouth every 4 hours 36 tablet 0    oxyCODONE IR (ROXICODONE) 5 MG tablet Take 2 tablets (10 mg) by mouth every 4 hours 60 tablet 0    phentermine (ADIPEX-P) 15 MG capsule Take 15 mg by mouth every morning      tolterodine ER (DETROL LA) 2 MG 24 hr capsule Take 1-2 capsules (2-4 mg) by mouth daily 10 capsule 0    venlafaxine (EFFEXOR) 100 MG tablet Take 1 tablet (100 mg) by mouth 2 times daily Total of 150 mg bid 180 tablet 1    venlafaxine (EFFEXOR) 50 MG tablet Take 1 tablet (50 mg) by mouth 2 times daily Total of 150 mg twice daily 180 tablet 1       ROS:  A complete review of systems was performed with the patient and all systems negative except as per HPI.    Physical Examination:  LMP 08/01/2023 (Approximate)   General: Well hydrated. No acute distress.  Abdomen: Soft, NT, not distended. Incisions sites are clean and healed; former ileostomy site is healed; loop ileostomy in place and with pouch in place      ASSESSMENT  49 y/o lady with UC s/p robotic proctectomy, IPAA, and DLI.     Risks, benefits, and alternatives of operative treatment were thoroughly discussed with the patient, he/she understands these well and agrees to proceed.    PLAN  1. She needs to start measuring output from ileostomy and recording it;   2. Increase loperamide to qid  3. Start lomotil 1 tab bid - this may need to be titrated up  4. Follow up in my December clinic for pouchoscopy  5. GGE prior to pouchoscopy  6. Labs today to assess for dehydration    30 minutes spent on the date of the encounter doing chart review, history and exam, imaging review, documentation and further activities as noted above.      Referring Provider:  Quinton Ram MD  [address] [place] [address]  [place] [address]     Primary Care Provider:  Anna Naidu        Again, thank you for allowing me to participate in the care of your patient.      Sincerely,    Quinton Ram MD

## 2023-10-10 NOTE — TELEPHONE ENCOUNTER
[facility] Prior Authorization Team Request    Medication:    Dosing:   NDC (required for Medicaid members): [address]-88  Insurance Company: PAID/[employer]  BIN: [id number]  PCN:   Grp:   ID: [id number]  CoverMyMeds Key (if applicable):   Additional documentation:   Pharmacy Filling the Rx:  [facility]  Filling Pharmacy Phone:  [phone]  Contact: [facility] (P [id number]) please send all responses to this pool.  Pharmacy NPI (required for Medicaid members): [id number]

## 2023-10-10 NOTE — NURSING NOTE
"Chief Complaint   Patient presents with    Post-op Visit       Vitals:    10/10/23 1146   BP: 103/74   BP Location: Left arm   Patient Position: Sitting   Cuff Size: Adult Large   Pulse: 107   SpO2: 100%   Weight: 193 lb 4.8 oz   Height: 5' 2\"       Body mass index is 35.36 kg/m .    Tiffanie Naidu CMA    "

## 2023-10-10 NOTE — PATIENT INSTRUCTIONS
Meds were sent down to discharge pharmacy   Schedule TUTU in the waiting room   Plan for takedown end of Dec     Follow up:    You can call Cathy, our surgery scheduler, directly to start the scheduling process.    Appointment you will need in prep: pre op physical with our anesthesia team and blood work     You will be undergoing a large operation. In preparation for your surgery we ask that you focus on a healthy lifestyle to help in the aid of your recovery and to reduce your post surgical complications. Below are a few guidelines to follow:    Schedule an appointment with your primary care physician to discuss how to best improve your overall health condition   If you have diabetes, please visit with your provider to optimize your blood sugar control   Engage in 30 minutes of exercise 5x a week or to the best of your ability. This can be in the form of walking, cycling, weight lifting, running or swimming  Focus on a healthy diet, high in fiber and protein  Vegetables and fruit at every meal   Lean proteins such as fish and chicken   Protein shakes are also encourage   Drink at least 64 ounces of water daily   Avoid alcohol and excessive caffeine   Stop smoking if you are currently smoking     TEJINDER De Luna [phone]    Clinic Fax Number [phone]    Surgery Scheduling (Cathy) [phone]    My Chart is available 24 hours a day and is a secure way to access your records and communicate with your care team.  I strongly recommend signing up if you haven't already done so, if you are comfortable with computers.  If you would like to inquire about this or are having problems with My Chart access, you may call [phone] or go online at [email or web address].  Please allow at least 24 hours for a response and extra time on weekends and Holidays.

## 2023-10-13 NOTE — TELEPHONE ENCOUNTER
Central Prior Authorization Team   Phone: [phone]    PA Initiation    Medication: ONDANSETRON 4 MG PO TBDP  Insurance Company: [employer] Non-Specialty PA's - Phone [phone] Fax [phone]  Pharmacy Filling the Rx: [facility] [place], [place] - [address] [place] [address] [phone]  Filling Pharmacy Phone: [phone]  Filling Pharmacy Fax:    Start Date: 10/13/2023

## 2023-10-17 NOTE — TELEPHONE ENCOUNTER
This was not reviewed for Qty. I spoke to Dorothy at [employer] and initiated the Qty Limit exception. It has been approved. She will fax the approval letter to me. Pharmacy was notified to correct their most recent claim to allow the increased quantity. Pharmacy filled 8 mg ODT in the meantime from an old Rx. They should be able to get a paid claim when she is due for next fill.

## 2023-10-17 NOTE — TELEPHONE ENCOUNTER
Prior Authorization Approval    Medication: ONDANSETRON 4 MG PO TBDP  Authorization Effective Date: 9/17/2023  Authorization Expiration Date: 4/14/2024  Approved Dose/Quantity:   Reference #: [id number]   Insurance Company: [employer] Non-Specialty PA's - Phone [phone] Fax [phone]  Expected CoPay: $    CoPay Card Available:      Financial Assistance Needed:   Which Pharmacy is filling the prescription: [facility] [place], [place] - [address] [place] [address] [phone]  Pharmacy Notified: Yes  Patient Notified: No

## 2023-10-18 ENCOUNTER — HOSPITAL ENCOUNTER (OUTPATIENT)
Dept: WOUND CARE | Facility: CLINIC | Age: 48
Discharge: HOME | End: 2023-10-18
Attending: FAMILY MEDICINE | Admitting: FAMILY MEDICINE
Payer: COMMERCIAL

## 2023-10-18 PROCEDURE — G0463 HOSPITAL OUTPT CLINIC VISIT: HCPCS

## 2023-10-18 NOTE — PROGRESS NOTES
"[facility]    Start of Care in [facility]: 9/15/2023   Referring Doctor: Judi PARKER  Primary Care Provider: Anna Naidu   Wound Location: abdomen - ostomy takedown site   Type of Ostomy/Surgery Date/Surgeon: loop ileostomy (second stage of IPAA J pouch surgery), 9/5/23, Dr. Ram    Reason for Visit: ileostomy recheck     Subjective: Pt states she changed her pouch 2 nights ago because it was \"itching really, really bad.\" Average wear time for her pouch has been 4 days. She states she has had leaks about half the time, usually on day 3 or 4. She knows she's \"pushing it\" with how often she's doing pouch changes. She's added a little more ring to the 10:00 area under the skin barrier.     Pt tearful during visit because she feels so tired and has so little energy.    Colorectal is having her start a fiber supplement because she's been having high volume output (>1000mL/day).    She's also having rectal discharge. It was \"like a faucet\" before. She said it's slowed down but now she's feeling pressure \"almost all the time\". She's already had 6 episodes of brown-tinged mucus (before it was clear) today. She voices continuous burning. She's wondering too about restarting her supplements which she stopped in January 2023 - Vitamin D, Calcium, Magnesium, multivitamin.    This writer messaged Annamarie Espinal with colorectal during appointment. She stated all of those findings are normal, she can use a barrier paste rectally to see if it helps with the burning, and she can restart her supplements.    She'll be meeting with the surgeon in December about when she'll have the final surgery. She hasn't healed enough to have it in November which the original hope.    She received her supplies from [employer].     Pt arrived independently.    HPI/Pertinent information from chart review:  From hospital discharge:  This is a 48 year old female with complex medical " history to include ulcerative colitis s/p TAC with end ileostomy (laparoscopic) on 6/15/21 with subsequent parastomal hernia, inflammatory arthritis secondary to immunotherapy, chronic prednisone use, type 2 diabetes, obesity, history of CVA in 2004 after giving birth, prothrombin deficiency, obstructive sleep apnea, asthma, lymphedema, lumbago, chronic pain, depression, anxiety and insomnia. She is now s/p robotic proctectomy, ileostomy takedown, and creation of ileal J-pouch.     There was some concern regarding possible ileovaginal fistula based on thick brown/red vaginal drainage, though this was thought to be less likely based on operative and exam findings. If there continues to be ongoing concerns of drainage of stool or succus from the vagina, we would recommend vaginal contrast vs pouchogram to further assess the presence of a fistula.     1.  Laparoscopic robotic-assisted completion proctectomy.  2.  Takedown of end ileostomy  3.  Creation of ileal J-pouch with ileoanal anastomosis   4.  Flexible pouchoscopy/sigmoidoscopy.  5.  Diverting loop ileostomy creation     Past Medical History:  Patient Active Problem List   Diagnosis    CARDIOVASCULAR SCREENING; LDL GOAL LESS THAN 160    DUB (dysfunctional uterine bleeding)    Anxiety state    Esophageal reflux    Moderate major depression (H)    Mild intermittent asthma without complication    Vision changes    Prothrombin mutation (H24)    Metastatic malignant melanoma (H)    Malignant melanoma of left upper extremity including shoulder (H)    Functional diarrhea    Colitis    Rectal bleeding    Bilateral leg cramps    Rash and nonspecific skin eruption    Other chronic pain    Immunosuppressed status (H24)    Ulcerative colitis with complication, unspecified location (H)    Morbid obesity (H)    Cervical high risk HPV (human papillomavirus) test positive    STORMY (obstructive sleep apnea)    Secondary lymphedema    Chronic neutrophilia    Diabetes mellitus,  iatrogenic (H)    High risk HPV infection    Abdominal pain    Anemia    Candidiasis of mouth    Hypocalcemia    Malaise    Menstrual irregularity    Arthritis, rheumatoid (H)    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 difficile colitis    Ulcerative colitis without complications, unspecified location (H)    Colostomy in place (H)    S/P colectomy    Polyarthralgia    Drug-induced polyneuropathy (H24)    Arthralgia of both knees    Adjustment disorder with mixed emotional features    Migraines    Biliary dyskinesia    Gastroesophageal reflux disease without esophagitis    Post-op pain                     Tobacco Use:     Tobacco Use      Smoking status: Former        Packs/day: 1.00        Years: 5.00        Additional pack years: 0.00        Total pack years: 5.00        Types: Cigarettes        Quit date: 3/20/1998        Years since quittin.5      Smokeless tobacco: Never       Diabetic: yes  HgbA1C:   Hemoglobin A1C   Date Value Ref Range Status   2023 6.3 (H) 0.0 - 5.6 % Final     Comment:     Normal <5.7%   Prediabetes 5.7-6.4%    Diabetes 6.5% or higher     Note: Adopted from ADA consensus guidelines.   2021 7.5 (H) 0 - 5.6 % Final     Comment:     Normal <5.7% Prediabetes 5.7-6.4%  Diabetes 6.5% or higher - adopted from ADA   consensus guidelines.       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     Current pouching system: Coloplast SenSura [place] 36824 with an Sarah ring and Coloplast Brava Elastic Barrier  "Strips     Who changes the pouch? pt     Participants in cares today: pt    Any limitations (dexterity, vision, hearing)? no     No new photo  Location: RLQ  Color/Moisture: red, moist  Viable: yes  Size: 1\"  Shape: round  MCJ: intact, granulomas x3 from 11-1:00  Os: 2 - both are on the apex; with the shape of the stoma the 2 stomas fold in toward one another  Protrusion: fluctuates from flush to up to 2cm  Peristomal skin: satellite lesions are improved; scant denudements extending about 3mm distally  Output: mushy brown output  Abdominal profile/plane: stoma sinks into abdomen with sitting, standing, leaning; area around stoma is soft so appropriate for deep convexity (assessed last visit)    Assessment:  Loop ileostomy with minimal protrusion, unusual postioning of os's, and soft abdomen - currently having reliable wear time of 2-4 days  Pt exhausted, having high output, having increased rectal discharge, and not eating much (but voices staying hydrated).  Pt with minimal support at home (high school daughter), no home care available to see pt    Plan of Care:  Ostomy: Continuing plan of care - antifungal powder to crust, partial Sarah ring to fill in area in peristomal plane at 10:00 in line with umbilicus, deep convex one-piece Coloplast SenSura [place] pouch 59781 cut to 1\", Sarah ring, belt, Coloplast Elastic Barrier Strips. Continue to change every 2-4 days (recommend every 2-3).  Continuing with Imodium and starting fiber per colorectal.  Restart multivitamins (Magnesium, MV, Vitamin D, Calcium).  Increase protein - protein shakes/pudding, high protein food choices with goal of  grams/protein/day.  Can use barrier paste on perianal area and see if burning improves.    Discussion/Education:  actively listened/acknowledged pts frustrations; plan of care with rationale    Prior - Imodium - to increase and can take up to 8/day (only taking 3/day at this time); eating marshmallows or peanut butter (or both) " prior to going to sleep to help thicken stool; pt is able to perform cares     Cares Performed:  Pouch change per above plan of care    Topical care: none      The following discharge instructions were reviewed with and sent home with the patient (prior; no new today):  See prior AVS    The following supplies were sent home with the patient:  none    Return visit: 2 weeks    Verbal, written (prior), & demonstrative education provided.  Face to face time: approximately 50 minutes  Procedure: none    Florida Huffman RN, CWOCN  [phone]

## 2023-10-20 ENCOUNTER — TELEPHONE (OUTPATIENT)
Dept: SURGERY | Facility: CLINIC | Age: 48
End: 2023-10-20
Payer: COMMERCIAL

## 2023-10-20 NOTE — TELEPHONE ENCOUNTER
Surgery Scheduled DOS 12/27/2023; Surgery Packet sent    On 10/20/2023 at 12:01 PM:    Patient is scheduled for surgery with Dr. Quinton Ram    Spoke with: Doretha    Date of Surgery: Weds December 27, 2023    Location: [facility]    Pre op with Provider Dr. Quinton Ram on 12/13/2023    GGE scheduled at the [facility] on 12/13/2023    H&P: Scheduled with PAC on 12/15/2023    Labs at the [facility] after PAC on 12/15/23    2x Post-op appts scheduled    Surgery packet: sent via [employer] and Mail     Additional comments:   - per instructions from Dr. Quinton Ram, scheduled surgery at end of December, 2023    Cathy Terrazas  Misti-op Coordinator  [facility]  Direct Phone: [phone]

## 2023-10-20 NOTE — TELEPHONE ENCOUNTER
FUTURE VISIT INFORMATION      SURGERY INFORMATION:  Date: 23  Location: [facility] or  Surgeon:  Quinton Ram MD   Anesthesia Type:  general  Procedure: CLOSURE, ILEOSTOMY   Consult:     RECORDS REQUESTED FROM:       Primary Care Provider: Anna Naidu MD - [facility]    Pertinent Medical History: salty    Most recent EKG+ Tracin23    Most recent ECHO: 21

## 2023-10-23 ENCOUNTER — APPOINTMENT (OUTPATIENT)
Dept: CT IMAGING | Facility: CLINIC | Age: 48
End: 2023-10-23
Attending: EMERGENCY MEDICINE
Payer: COMMERCIAL

## 2023-10-23 ENCOUNTER — HOSPITAL ENCOUNTER (EMERGENCY)
Facility: CLINIC | Age: 48
Discharge: HOME | End: 2023-10-24
Attending: EMERGENCY MEDICINE | Admitting: EMERGENCY MEDICINE
Payer: COMMERCIAL

## 2023-10-23 DIAGNOSIS — R11.0 NAUSEA: ICD-10-CM

## 2023-10-23 DIAGNOSIS — R74.8 ELEVATED LIVER ENZYMES: ICD-10-CM

## 2023-10-23 DIAGNOSIS — N17.9 AKI (ACUTE KIDNEY INJURY) (H): ICD-10-CM

## 2023-10-23 LAB
ALBUMIN SERPL BCG-MCNC: 4.9 G/DL (ref 3.5–5.2)
ALP SERPL-CCNC: 246 U/L (ref 35–104)
ALT SERPL W P-5'-P-CCNC: 213 U/L (ref 0–50)
ANION GAP SERPL CALCULATED.3IONS-SCNC: 19 MMOL/L (ref 7–15)
AST SERPL W P-5'-P-CCNC: 159 U/L (ref 0–45)
BASOPHILS # BLD AUTO: 0.1 10E3/UL (ref 0–0.2)
BASOPHILS NFR BLD AUTO: 1 %
BILIRUB SERPL-MCNC: 0.3 MG/DL
BUN SERPL-MCNC: 21 MG/DL (ref 6–20)
CALCIUM SERPL-MCNC: 10.5 MG/DL (ref 8.6–10)
CHLORIDE SERPL-SCNC: 93 MMOL/L (ref 98–107)
CREAT SERPL-MCNC: 1.32 MG/DL (ref 0.51–0.95)
DEPRECATED HCO3 PLAS-SCNC: 19 MMOL/L (ref 22–29)
EGFRCR SERPLBLD CKD-EPI 2021: 50 ML/MIN/1.73M2
EOSINOPHIL # BLD AUTO: 0.2 10E3/UL (ref 0–0.7)
EOSINOPHIL NFR BLD AUTO: 1 %
ERYTHROCYTE [DISTWIDTH] IN BLOOD BY AUTOMATED COUNT: 15.7 % (ref 10–15)
GLUCOSE SERPL-MCNC: 97 MG/DL (ref 70–99)
HCT VFR BLD AUTO: 41.9 % (ref 35–47)
HGB BLD-MCNC: 13.7 G/DL (ref 11.7–15.7)
IMM GRANULOCYTES # BLD: 0.1 10E3/UL
IMM GRANULOCYTES NFR BLD: 1 %
LYMPHOCYTES # BLD AUTO: 3.7 10E3/UL (ref 0.8–5.3)
LYMPHOCYTES NFR BLD AUTO: 28 %
MCH RBC QN AUTO: 27.1 PG (ref 26.5–33)
MCHC RBC AUTO-ENTMCNC: 32.7 G/DL (ref 31.5–36.5)
MCV RBC AUTO: 83 FL (ref 78–100)
MONOCYTES # BLD AUTO: 0.8 10E3/UL (ref 0–1.3)
MONOCYTES NFR BLD AUTO: 6 %
NEUTROPHILS # BLD AUTO: 8.3 10E3/UL (ref 1.6–8.3)
NEUTROPHILS NFR BLD AUTO: 63 %
NRBC # BLD AUTO: 0 10E3/UL
NRBC BLD AUTO-RTO: 0 /100
PLATELET # BLD AUTO: 542 10E3/UL (ref 150–450)
POTASSIUM SERPL-SCNC: 4.5 MMOL/L (ref 3.4–5.3)
PROT SERPL-MCNC: 8.7 G/DL (ref 6.4–8.3)
RBC # BLD AUTO: 5.05 10E6/UL (ref 3.8–5.2)
SODIUM SERPL-SCNC: 131 MMOL/L (ref 135–145)
WBC # BLD AUTO: 13.2 10E3/UL (ref 4–11)

## 2023-10-23 PROCEDURE — 250N000009 HC RX 250: Performed by: EMERGENCY MEDICINE

## 2023-10-23 PROCEDURE — 99285 EMERGENCY DEPT VISIT HI MDM: CPT | Performed by: EMERGENCY MEDICINE

## 2023-10-23 PROCEDURE — 258N000003 HC RX IP 258 OP 636: Performed by: EMERGENCY MEDICINE

## 2023-10-23 PROCEDURE — 99285 EMERGENCY DEPT VISIT HI MDM: CPT | Mod: 25

## 2023-10-23 PROCEDURE — 96361 HYDRATE IV INFUSION ADD-ON: CPT

## 2023-10-23 PROCEDURE — 96374 THER/PROPH/DIAG INJ IV PUSH: CPT | Mod: 59

## 2023-10-23 PROCEDURE — 36415 COLL VENOUS BLD VENIPUNCTURE: CPT | Performed by: STUDENT IN AN ORGANIZED HEALTH CARE EDUCATION/TRAINING PROGRAM

## 2023-10-23 PROCEDURE — 250N000011 HC RX IP 250 OP 636: Performed by: EMERGENCY MEDICINE

## 2023-10-23 PROCEDURE — 74177 CT ABD & PELVIS W/CONTRAST: CPT

## 2023-10-23 PROCEDURE — 85025 COMPLETE CBC W/AUTO DIFF WBC: CPT | Performed by: STUDENT IN AN ORGANIZED HEALTH CARE EDUCATION/TRAINING PROGRAM

## 2023-10-23 PROCEDURE — 250N000011 HC RX IP 250 OP 636: Mod: JZ | Performed by: STUDENT IN AN ORGANIZED HEALTH CARE EDUCATION/TRAINING PROGRAM

## 2023-10-23 PROCEDURE — 80053 COMPREHEN METABOLIC PANEL: CPT | Performed by: EMERGENCY MEDICINE

## 2023-10-23 PROCEDURE — 85025 COMPLETE CBC W/AUTO DIFF WBC: CPT | Performed by: EMERGENCY MEDICINE

## 2023-10-23 RX ORDER — IOPAMIDOL 755 MG/ML
92 INJECTION, SOLUTION INTRAVASCULAR ONCE
Status: COMPLETED | OUTPATIENT
Start: 2023-10-23 | End: 2023-10-23

## 2023-10-23 RX ORDER — ONDANSETRON 2 MG/ML
4 INJECTION INTRAMUSCULAR; INTRAVENOUS ONCE
Status: COMPLETED | OUTPATIENT
Start: 2023-10-23 | End: 2023-10-23

## 2023-10-23 RX ORDER — SODIUM CHLORIDE, SODIUM LACTATE, POTASSIUM CHLORIDE, CALCIUM CHLORIDE 600; 310; 30; 20 MG/100ML; MG/100ML; MG/100ML; MG/100ML
1000 INJECTION, SOLUTION INTRAVENOUS CONTINUOUS
Status: DISCONTINUED | OUTPATIENT
Start: 2023-10-23 | End: 2023-10-24 | Stop reason: HOSPADM

## 2023-10-23 RX ADMIN — IOPAMIDOL 92 ML: 755 INJECTION, SOLUTION INTRAVENOUS at 23:51

## 2023-10-23 RX ADMIN — ONDANSETRON 4 MG: 2 INJECTION INTRAMUSCULAR; INTRAVENOUS at 23:14

## 2023-10-23 RX ADMIN — SODIUM CHLORIDE 63 ML: 9 INJECTION, SOLUTION INTRAVENOUS at 23:51

## 2023-10-23 RX ADMIN — SODIUM CHLORIDE, POTASSIUM CHLORIDE, SODIUM LACTATE AND CALCIUM CHLORIDE 1000 ML: 600; 310; 30; 20 INJECTION, SOLUTION INTRAVENOUS at 23:59

## 2023-10-23 ASSESSMENT — ENCOUNTER SYMPTOMS
CARDIOVASCULAR NEGATIVE: 1
MUSCULOSKELETAL NEGATIVE: 1
EYES NEGATIVE: 1
ENDOCRINE NEGATIVE: 1
HEMATOLOGIC/LYMPHATIC NEGATIVE: 1
PSYCHIATRIC NEGATIVE: 1
GASTROINTESTINAL NEGATIVE: 1
SHORTNESS OF BREATH: 1
FATIGUE: 1
WEAKNESS: 1
ALLERGIC/IMMUNOLOGIC NEGATIVE: 1

## 2023-10-24 ENCOUNTER — TELEPHONE (OUTPATIENT)
Dept: SURGERY | Facility: CLINIC | Age: 48
End: 2023-10-24
Payer: COMMERCIAL

## 2023-10-24 VITALS
OXYGEN SATURATION: 100 % | TEMPERATURE: 96.3 F | WEIGHT: 188 LBS | HEIGHT: 63 IN | BODY MASS INDEX: 33.31 KG/M2 | RESPIRATION RATE: 16 BRPM | HEART RATE: 89 BPM | DIASTOLIC BLOOD PRESSURE: 80 MMHG | SYSTOLIC BLOOD PRESSURE: 123 MMHG

## 2023-10-24 DIAGNOSIS — R19.8 HIGH OUTPUT ILEOSTOMY (H): Primary | ICD-10-CM

## 2023-10-24 DIAGNOSIS — E86.0 DEHYDRATION: ICD-10-CM

## 2023-10-24 DIAGNOSIS — Z93.2 HIGH OUTPUT ILEOSTOMY (H): Primary | ICD-10-CM

## 2023-10-24 DIAGNOSIS — R74.8 ELEVATED LIVER ENZYMES: ICD-10-CM

## 2023-10-24 LAB
HOLD SPECIMEN: NORMAL
HOLD SPECIMEN: NORMAL

## 2023-10-24 ASSESSMENT — ACTIVITIES OF DAILY LIVING (ADL): ADLS_ACUITY_SCORE: 37

## 2023-10-24 NOTE — ED TRIAGE NOTES
Pt has a colostomy as of September. Has been nauseous and having up to 1600mL output in the bag.      Triage Assessment (Adult)       Row Name 10/23/23 2003          Triage Assessment    Airway WDL WDL        Respiratory WDL    Respiratory WDL WDL        Skin Circulation/Temperature WDL    Skin Circulation/Temperature WDL WDL        Cardiac WDL    Cardiac WDL WDL        Peripheral/Neurovascular WDL    Peripheral Neurovascular WDL WDL        Cognitive/Neuro/Behavioral WDL    Cognitive/Neuro/Behavioral WDL WDL

## 2023-10-24 NOTE — ED PROVIDER NOTES
History     Chief Complaint   Patient presents with    Nausea     HPI  Doretha Yu is a 48 year old female who presents with nausea and concern for high output in her ostomy.    Medical records show multiple medical diagnoses including history of anxiety, GERD, mild intermittent asthma and a history of metastatic malignant melanoma.  She is also known to have obstructive sleep apnea and prothrombin mutation.  She has functional diarrhea.  And history of ulcerative colitis and C. difficile colitis with ostomy in place.    Prescribed medications were reviewed.    On examination patient arrived by car from home reporting that she has had nausea and exertional fatigue and weakness.  She also notes that she has had increasing output from her ostomy.  She reports no fever.  Some mild abdominal discomfort.  No chest pain or palpitations.  Given fatigue shortness of breath with activity and ostomy output she presents for further care    Allergies:  Allergies   Allergen Reactions    Bee Venom Swelling    Azithromycin Diarrhea    Erythromycin      Other reaction(s): GI intolerance, Vomiting    Fentanyl Other (See Comments)     sweating  sweating    Prochlorperazine Fatigue     Other reaction(s): Other (see comments)  Fatigue    Buspirone      Other reaction(s): GI intolerance  vomiting    Erythrocin Nausea and Vomiting    Gluten Meal      Celiac disease    Topamax [Topiramate]      Made her lethargic    Zithromax [Azithromycin Dihydrate] Diarrhea    Enbrel [Etanercept] Hives and Rash       Problem List:    Patient Active Problem List    Diagnosis Date Noted    Post-op pain 09/13/2023     Priority: Medium    Gastroesophageal reflux disease without esophagitis 09/05/2023     Priority: Medium    Biliary dyskinesia 01/11/2023     Priority: Medium    Migraines      Priority: Medium    Colostomy in place (H) 07/16/2021     Priority: Medium    S/P colectomy 07/16/2021     Priority: Medium    Ulcerative colitis without  complications, unspecified location (H) 06/03/2021     Priority: Medium    Dehydration 04/13/2021     Priority: Medium    Hematochezia 04/13/2021     Priority: Medium    Diarrhea of infectious origin 04/13/2021     Priority: Medium    C. difficile colitis 04/13/2021     Priority: Medium    Adjustment disorder with mixed emotional features 06/16/2020     Priority: Medium    Polyarthralgia 04/29/2020     Priority: Medium    Drug-induced polyneuropathy (H24) 04/29/2020     Priority: Medium    Pain syndrome, chronic 02/12/2020     Priority: Medium    Drug-induced Cushing's syndrome (H24) 02/12/2020     Priority: Medium    Diabetes mellitus, iatrogenic (H) 01/28/2020     Priority: Medium    High risk HPV infection 01/28/2020     Priority: Medium     Added automatically from request for surgery [id number]      Immunosuppression (H24) 01/28/2020     Priority: Medium     Added automatically from request for surgery [id number]      STORMY (obstructive sleep apnea) 01/27/2020     Priority: Medium     1/2019 (241#)-AHI 24, lowest oxygen saturation was 86%, no periodic limb movement were noted, CPAP 8 cm/H20 was effective.      Secondary lymphedema 01/27/2020     Priority: Medium    Chronic neutrophilia 01/27/2020     Priority: Medium    Cervical high risk HPV (human papillomavirus) test positive 12/13/2019     Priority: Medium     2011 NIL pap.  2015 NIL pap, Neg HPV.  12/13/19 NIL pap , + HR HPV 16. Plan colp.   1/6/19 Failed colp exam secondary to anatomic constraints with gyn. Referred to gyn/onc.   2/25/20 Colpo bx and ECC Negative for dysplasia. Plan cotest in 1 year.   8/20/21 NIL pap, Neg HPV. Plan cotest in 1 year per provider.   9/28/22 Lost to follow-up for pap tracking       Immunosuppressed status (H24) 09/05/2019     Priority: Medium    Ulcerative colitis with complication, unspecified location (H) 09/05/2019     Priority: Medium    Morbid obesity (H) 09/05/2019     Priority: Medium    Arthritis, rheumatoid (H) 11/26/2018      Priority: Medium    Hypocalcemia 05/15/2018     Priority: Medium    Anemia 05/14/2018     Priority: Medium    Menstrual irregularity 05/14/2018     Priority: Medium    Arthralgia of both knees 05/12/2018     Priority: Medium     Formatting of this note might be different from the original.  Work-up in progress. There is concern for inflammatory arthritis.      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     Priority: Medium    Malignant melanoma of left upper extremity including shoulder (H) 10/12/2017     Priority: Medium    Metastatic malignant melanoma (H) 10/10/2017     Priority: Medium    Prothrombin mutation (H24) 04/05/2017     Priority: Medium     On daily aspirin 81 mg per hematology's recommendations from 2008      Vision changes 04/01/2017     Priority: Medium    Mild intermittent asthma without complication 11/08/2013     Priority: Medium    Moderate major depression (H) 06/24/2013     Priority: Medium    Anxiety state 09/13/2012     Priority: Medium     Problem list name updated by automated process. Provider to review      Esophageal reflux 09/13/2012     Priority: Medium    DUB (dysfunctional uterine bleeding) 07/28/2011     Priority: Medium    CARDIOVASCULAR SCREENING; LDL GOAL LESS THAN 160 07/28/2011     Priority: Low        Past Medical History:    Past Medical History:   Diagnosis Date    Abnormal MRI     Anxiety     Basal cell carcinoma     Cervical high risk HPV (human  papillomavirus) test positive 2019    Colitis     Depression     Diabetes mellitus, iatrogenic (H) 2020    Esophageal reflux     Inflammatory arthritis     Insomnia     Intestinal giardiasis 2018    Lumbago     Lymphedema     Malignant melanoma (H)     Melanoma (H) 10/23/2017    Migraines     Mild persistent asthma     Morbid obesity with BMI of 40.0-44.9, adult (H)     STORMY (obstructive sleep apnea)     Prothrombin deficiency (H)     Stroke (cerebrum) (H)     TIA (transient ischemic attack)     Type 2 diabetes mellitus (H)     Ulcerative pancolitis (H)        Past Surgical History:    Past Surgical History:   Procedure Laterality Date    APPENDECTOMY      COLONOSCOPY N/A 10/18/2017    Procedure: COLONOSCOPY;  Colon;  Surgeon: Debbie Stephens MD;  [facility]    COLONOSCOPY N/A 2018    Procedure: COMBINED COLONOSCOPY, SINGLE OR MULTIPLE BIOPSY/POLYPECTOMY BY BIOPSY;  colon;  Surgeon: Benita Schumacher MD;  Location: [facility]    COLONOSCOPY      multiple since  to present - about 6 total    DAVINCI ASSISTED TRANSANAL TOTAL MESORECTAL EXCISION N/A 2023    Procedure: COMPLETION PROCTECTOMY, ROBOT-ASSISTED, ILEAL POUCH ANASTAMOSIS;  Surgeon: Quinton Ram MD;  Location: [facility]    DISSECT LYMPH NODE AXILLA Left 10/23/2017    Procedure: DISSECT LYMPH NODE AXILLA;  Left Axillary Lymph Node Dissection ;  Surgeon: Laurent Cool MD;  Location: [facility]    EXAM UNDER ANESTHESIA PELVIC N/A 2020    Procedure: EXAM UNDER ANESTHESIA, PELVIS; with Cervical Biopsies, Vaginal Biopsy and Endocervical Curettings;  Surgeon: Melina Jung MD;  Location: [facility]    GYN SURGERY  ,         LAPAROSCOPIC ASSISTED COLECTOMY N/A 06/15/2021    Procedure: laparoscopic total abdominal colectomy, end ileostomy;  Surgeon: Quinton Ram MD;  Location: [facility]    REPAIR MOHS Left 2017    Procedure: REPAIR MOHS;  Left Upper Lid Moh's Reconstruction;  Surgeon:  Kisha Bosch MD;  Location: [facility]    SIGMOIDOSCOPY FLEXIBLE N/A 2023    Procedure: Sigmoidoscopy flexible;  Surgeon: Quinton Ram MD;  Location: [facility]       Family History:    Family History   Problem Relation Age of Onset    Cancer Mother 45        lung    Neurologic Disorder Mother         epilepsy    Lipids Father     Gastrointestinal Disease Father         diverticulitis     Depression Father     Colitis Father     Colon Cancer Father     Diverticulitis Father     Depression Sister     Cancer Maternal Grandmother     Blood Disease Maternal Grandmother         lymphoma     Arthritis Maternal Grandmother     Diabetes Maternal Grandmother     Depression Maternal Grandmother     Macular Degeneration Maternal Grandmother     Glaucoma Maternal Grandmother     Diabetes Maternal Grandfather     Cerebrovascular Disease Maternal Grandfather     Blood Disease Maternal Grandfather     Heart Disease Maternal Grandfather     Glaucoma Maternal Grandfather     Cancer Paternal Grandmother     Cancer - colorectal Paternal Grandmother     Colitis Paternal Grandmother     Colon Cancer Paternal Grandmother     Diverticulitis Paternal Grandmother     Respiratory Paternal Grandfather         emphysema     Colitis Paternal Grandfather     Colon Cancer Paternal Grandfather     Diverticulitis Paternal Grandfather     Heart Disease Daughter     Asthma Daughter     Melanoma No family hx of     Anesthesia Reaction No family hx of     Clotting Disorder No family hx of        Social History:  Marital Status:   [4]  Social History     Tobacco Use    Smoking status: Former     Packs/day: 1.00     Years: 5.00     Additional pack years: 0.00     Total pack years: 5.00     Types: Cigarettes     Quit date: 3/20/1998     Years since quittin.6    Smokeless tobacco: Never   Vaping Use    Vaping Use: Never used   Substance Use Topics    Alcohol use: Not Currently    Drug use: Yes     Types: Marijuana     Comment:  "vape, edible        Medications:    acetaminophen (TYLENOL) 325 MG tablet  albuterol (PROAIR HFA/PROVENTIL HFA/VENTOLIN HFA) 108 (90 Base) MCG/ACT inhaler  diphenoxylate-atropine (LOMOTIL) 2.5-0.025 MG tablet  famotidine (PEPCID) 20 MG tablet  hydrOXYzine (ATARAX) 25 MG tablet  loperamide (IMODIUM A-D) 2 MG tablet  loperamide (IMODIUM A-D) 2 MG tablet  medical cannabis (Patient's own supply)  menthol (ICY HOT) 5 % PTCH  menthol-zinc oxide (CALMOSEPTINE) 0.44-20.6 % OINT ointment  metFORMIN (GLUCOPHAGE) 500 MG tablet  methocarbamol (ROBAXIN) 750 MG tablet  ondansetron (ZOFRAN ODT) 4 MG ODT tab  ondansetron (ZOFRAN ODT) 4 MG ODT tab  Ostomy Supplies MISC  oxyCODONE (ROXICODONE) 5 MG tablet  phentermine (ADIPEX-P) 15 MG capsule  tolterodine ER (DETROL LA) 2 MG 24 hr capsule  venlafaxine (EFFEXOR) 50 MG tablet  venlafaxine (EFFEXOR) 75 MG tablet          Review of Systems   Constitutional:  Positive for fatigue.        High ostomy output   HENT: Negative.     Eyes: Negative.    Respiratory:  Positive for shortness of breath.    Cardiovascular: Negative.    Gastrointestinal: Negative.    Endocrine: Negative.    Genitourinary: Negative.    Musculoskeletal: Negative.    Skin: Negative.    Allergic/Immunologic: Negative.    Neurological:  Positive for weakness.   Hematological: Negative.    Psychiatric/Behavioral: Negative.     All other systems reviewed and are negative.      Physical Exam   BP: 108/78  Pulse: 114  Temp: (!) 96.3  F (35.7  C)  Resp: 18  Height: 160 cm (5' 3\")  Weight: 85.3 kg (188 lb) (stated)  SpO2: 98 %      Physical Exam  Constitutional:       General: She is not in acute distress.     Appearance: Normal appearance. She is not ill-appearing, toxic-appearing or diaphoretic.   HENT:      Head: Normocephalic and atraumatic.      Nose: Nose normal.   Eyes:      Extraocular Movements: Extraocular movements intact.      Pupils: Pupils are equal, round, and reactive to light.   Cardiovascular:      Rate and " Rhythm: Normal rate.   Pulmonary:      Effort: Pulmonary effort is normal. No respiratory distress.      Breath sounds: Normal breath sounds. No stridor. No wheezing, rhonchi or rales.   Chest:      Chest wall: No tenderness.   Musculoskeletal:      Cervical back: Normal range of motion.   Skin:     Capillary Refill: Capillary refill takes less than 2 seconds.      Coloration: Skin is not jaundiced or pale.      Findings: No bruising, erythema, lesion or rash.   Neurological:      General: No focal deficit present.      Mental Status: She is alert and oriented to person, place, and time.      Cranial Nerves: No cranial nerve deficit.      Sensory: No sensory deficit.      Motor: No weakness.      Coordination: Coordination normal.      Gait: Gait normal.      Deep Tendon Reflexes: Reflexes normal.   Psychiatric:         Mood and Affect: Mood normal.         Behavior: Behavior normal.         Thought Content: Thought content normal.         Judgment: Judgment normal.         ED Course                 Procedures              Critical Care time:  none             ED medications:  Medications   lactated ringers infusion 1,000 mL ( Intravenous Canceled Entry 10/24/23 0000)   ondansetron (ZOFRAN) injection 4 mg (4 mg Intravenous $Given 10/23/23 2314)   lactated ringers BOLUS 1,000 mL (0 mLs Intravenous Stopped 10/24/23 0130)   iopamidol (ISOVUE-370) solution 92 mL (92 mLs Intravenous $Given 10/23/23 2351)   sodium chloride 0.9 % bag 500mL for CT scan flush use (63 mLs Intravenous $Given 10/23/23 2351)     ED Vitals:  Vitals:    10/23/23 2345 10/24/23 0000 10/24/23 0030 10/24/23 0055   BP: (!) 125/91 133/80 131/87 127/84   Pulse: 94 92 89    Resp:  16     Temp:       TempSrc:       SpO2: 96% 98% 97% 96%   Weight:       Height:         ED Labs and imaging:  Results for orders placed or performed during the hospital encounter of 10/23/23 (from the past 24 hour(s))   CBC with platelets, differential    Narrative    The  following orders were created for panel order CBC with platelets, differential.  Procedure                               Abnormality         Status                     ---------                               -----------         ------                     CBC with platelets and d...[[id number]]  Abnormal            Final result                 Please view results for these tests on the individual orders.   Comprehensive metabolic panel   Result Value Ref Range    Sodium 131 (L) 135 - 145 mmol/L    Potassium 4.5 3.4 - 5.3 mmol/L    Carbon Dioxide (CO2) 19 (L) 22 - 29 mmol/L    Anion Gap 19 (H) 7 - 15 mmol/L    Urea Nitrogen 21.0 (H) 6.0 - 20.0 mg/dL    Creatinine 1.32 (H) 0.51 - 0.95 mg/dL    GFR Estimate 50 (L) >60 mL/min/1.73m2    Calcium 10.5 (H) 8.6 - 10.0 mg/dL    Chloride 93 (L) 98 - 107 mmol/L    Glucose 97 70 - 99 mg/dL    Alkaline Phosphatase 246 (H) 35 - 104 U/L     (H) 0 - 45 U/L     (H) 0 - 50 U/L    Protein Total 8.7 (H) 6.4 - 8.3 g/dL    Albumin 4.9 3.5 - 5.2 g/dL    Bilirubin Total 0.3 <=1.2 mg/dL   [facility] Draw    Narrative    The following orders were created for panel order [facility] Draw.  Procedure                               Abnormality         Status                     ---------                               -----------         ------                     Extra Blue Top Tube[[id number]]                              Final result               Extra Red Top Tube[[id number]]                               Final result                 Please view results for these tests on the individual orders.   CBC with platelets and differential   Result Value Ref Range    WBC Count 13.2 (H) 4.0 - 11.0 10e3/uL    RBC Count 5.05 3.80 - 5.20 10e6/uL    Hemoglobin 13.7 11.7 - 15.7 g/dL    Hematocrit 41.9 35.0 - 47.0 %    MCV 83 78 - 100 fL    MCH 27.1 26.5 - 33.0 pg    MCHC 32.7 31.5 - 36.5 g/dL    RDW 15.7 (H) 10.0 - 15.0 %    Platelet Count 542 (H) 150 - 450 10e3/uL    % Neutrophils 63 %    % Lymphocytes  28 %    % Monocytes 6 %    % Eosinophils 1 %    % Basophils 1 %    % Immature Granulocytes 1 %    NRBCs per 100 WBC 0 <1 /100    Absolute Neutrophils 8.3 1.6 - 8.3 10e3/uL    Absolute Lymphocytes 3.7 0.8 - 5.3 10e3/uL    Absolute Monocytes 0.8 0.0 - 1.3 10e3/uL    Absolute Eosinophils 0.2 0.0 - 0.7 10e3/uL    Absolute Basophils 0.1 0.0 - 0.2 10e3/uL    Absolute Immature Granulocytes 0.1 <=0.4 10e3/uL    Absolute NRBCs 0.0 10e3/uL   Extra Blue Top Tube   Result Value Ref Range    Hold Specimen JIC    Extra Red Top Tube   Result Value Ref Range    Hold Specimen     CT Chest/Abdomen/Pelvis w Contrast    Narrative    EXAM: CT CHEST/ABDOMEN/PELVIS W CONTRAST  LOCATION: [facility]  DATE: 10/24/2023    INDICATION: Shortness of breath, weakness. Nausea, high ostomy output. Compare with imaging from 09/13/2023.  COMPARISON: 09/13/2023.  TECHNIQUE: CT scan of the chest, abdomen, and pelvis was performed following injection of IV contrast. Multiplanar reformats were obtained. Dose reduction techniques were used.   CONTRAST: 92 mL Isovue 370.    FINDINGS:   LUNGS AND PLEURA: Normal.    MEDIASTINUM/AXILLAE: Normal.    CORONARY ARTERY CALCIFICATION: None.    HEPATOBILIARY: Normal.    PANCREAS: Significant atrophy of the body and tail of the pancreas.    SPLEEN: Normal.    ADRENAL GLANDS: Normal.    KIDNEYS/BLADDER: Normal.    BOWEL: Colectomy with ileal J-pouch formation. Diverting ileostomy within the right lower abdomen. No bowel distention. There is mild soft tissue stranding around the pelvic J-pouch probably postsurgical.    LYMPH NODES: Normal.    VASCULATURE: Unremarkable.    PELVIC ORGANS: Few small follicles in the right ovary.    MUSCULOSKELETAL: Normal.      Impression    IMPRESSION:  1.  Proctectomy with right lower quadrant diverting nonobstructive ileostomy. Decreased postsurgical change. J-pouch in the pelvis with mild surrounding postsurgical change. No abscess.    2.  Pancreatic body  and tail atrophy.     *Note: Due to a large number of results and/or encounters for the requested time period, some results have not been displayed. A complete set of results can be found in Results Review.         Assessments & Plan (with Medical Decision Making)   Assessment Summary and Clinical Impression: 48-year-old female who presented with concern about high output from her ostomy and nausea she has a history of C. difficile colitis and also colitis and had an ostomy placed.  She also has other comorbidities including history of GERD obstructive sleep apnea and malignant melanoma of the left upper extremity and prothrombin mutation.  Patient is known to have a history of drug-induced Cushing syndrome.  On arrival she was tachycardic with heart rate of 114 blood pressure was 108/78.  Temperature 35.7.  On exam she appeared older than stated age but in no acute distress.  Hypoactive bowel sounds.  There is no significant output in the ostomy.  We agreed to a work-up to exclude possible causes for her symptoms including fatigue and exertional dyspnea.  Work-up revealed acute kidney injury and elevated liver enzymes.  Abdominal imaging did not reveal any emergency condition or intra-abdominal catastrophe.  After reviewing care goals we agreed to trial care as an outpatient with plan for lab recheck within 72 hours with primary care provider and low threshold to return for evaluation.    ED course and Plan:  Reviewed the medical record.  Work-up on arrival revealed a white count of 13.2 hemoglobin 15.7. Abdominal imaging on 9/13/20233254-[id number]-uy imaging was obtained today for interval comparison given high output in the ostomy and patient's report of nausea.    Work-up revealed elevated liver enzymes including alk phos of 246, AST of 219, ALT of 213.  Normal total bilirubin.  Patient has an anion gap of 19.  Creatinine was 1.32 baseline is 0.8.  She was treated with IV fluids for MARIO.     CT reveaked proctectomy  with right lower quadrant diverting nonobstructing ileostomy.  Mild surrounding postsurgical changes without abscess was noted.  Patient was reevaluated after CT imaging and blood work.  We discussed her kidney injury and elevated liver enzymes.  Patient expressed comfort going home with trial of care as an outpatient with plan for lab recheck within the next 3 days.  We discussed that if her symptoms worsen or she has new concerns she should return to be evaluated.      Disclaimer: This note consists of symbols derived from keyboarding, dictation and/or voice recognition software. As a result, there may be errors in the script that have gone undetected. Please consider this when interpreting information found in this chart.   I have reviewed the nursing notes.    I have reviewed the findings, diagnosis, plan and need for follow up with the patient.           Medical Decision Making  The patient's presentation was of moderate complexity (an acute illness with systemic symptoms).    The patient's evaluation involved:  ordering and/or review of 2 test(s) in this encounter (diagnostic imaging and labs)    The patient's management necessitated moderate risk (prescription drug management including medications given in the ED).        New Prescriptions    No medications on file       Final diagnoses:   MARIO (acute kidney injury) (H24)   Nausea   Elevated liver enzymes       10/23/2023   [facility] EMERGENCY DEPT       Yo Gagnon MD  10/24/23 0137

## 2023-10-24 NOTE — CONFIDENTIAL NOTE
Surgery reschedule from 12/27 to 12/13 vs January 2024 - per Dr. Ram current surgery date 12/272023 will not work for his schedule. Her said to offer wait list date 12/13/2023 (would be after his morning endoscopy block).    On 10/24/2023 at 12:37 PM and 12:39 PM:  Spoke with patient via phone, explained the above. Patient is not sure that she wants to schedule surgery before [facility] - she wants to discuss recovery process/ time with TEJINDER De Luna, before rescheduling. TEJINDER De Luna, said she'll call patient sometime today.     We agreed to wait list for possible date 12/13/2023 for now until she's spoken with TEJINDER De Luna, and has decided if she wants that date vs waiting until January 2024 for surgery. Case added to [facility] Wait List on 12/13/2023.    Cathy Terrazas  Misti-op Coordinator  [facility]  Direct Phone: [phone]

## 2023-10-24 NOTE — DISCHARGE INSTRUCTIONS
1) Your evaluation today revealed that your nausea vomiting and ostomy output has resulted in dehydration causing elevation in kidney function as discussed.  It is unclear if this is also related to your elevated liver enzymes that we reviewed today.  Abdominal imaging did not show any complications from your surgery.    2) we discussed the importance of staying hydrated including continue to use Zofran 8 mg every 6-8 hours and trying to drink fluids if able.  After receiving IV fluids we have agreed to allow you to trial care at home with plan to have a recheck of your creatinine, liver enzymes to ensure your numbers are improving.  Be sure to reach out to your primary care provider either through [facility] to schedule a lab recheck appointment.    3) If you develop worsening symptoms as discussed you should return to be reevaluated

## 2023-10-25 ENCOUNTER — LAB (OUTPATIENT)
Dept: LAB | Facility: CLINIC | Age: 48
End: 2023-10-25
Payer: COMMERCIAL

## 2023-10-25 ENCOUNTER — HOME INFUSION (PRE-WILLOW HOME INFUSION) (OUTPATIENT)
Dept: PHARMACY | Facility: CLINIC | Age: 48
End: 2023-10-25

## 2023-10-25 ENCOUNTER — PATIENT OUTREACH (OUTPATIENT)
Dept: CARE COORDINATION | Facility: CLINIC | Age: 48
End: 2023-10-25

## 2023-10-25 ENCOUNTER — NURSE TRIAGE (OUTPATIENT)
Dept: FAMILY MEDICINE | Facility: CLINIC | Age: 48
End: 2023-10-25

## 2023-10-25 DIAGNOSIS — R74.8 ELEVATED LIVER ENZYMES: ICD-10-CM

## 2023-10-25 DIAGNOSIS — E13.9 DIABETES MELLITUS, IATROGENIC (H): Primary | ICD-10-CM

## 2023-10-25 DIAGNOSIS — E86.0 DEHYDRATION: ICD-10-CM

## 2023-10-25 DIAGNOSIS — R19.8 HIGH OUTPUT ILEOSTOMY (H): ICD-10-CM

## 2023-10-25 DIAGNOSIS — Z93.2 HIGH OUTPUT ILEOSTOMY (H): ICD-10-CM

## 2023-10-25 LAB
ALBUMIN SERPL BCG-MCNC: 4.7 G/DL (ref 3.5–5.2)
ALP SERPL-CCNC: 250 U/L (ref 35–104)
ALT SERPL W P-5'-P-CCNC: 215 U/L (ref 0–50)
ANION GAP SERPL CALCULATED.3IONS-SCNC: 16 MMOL/L (ref 7–15)
AST SERPL W P-5'-P-CCNC: 116 U/L (ref 0–45)
BASOPHILS # BLD AUTO: 0 10E3/UL (ref 0–0.2)
BASOPHILS NFR BLD AUTO: 0 %
BILIRUB SERPL-MCNC: 0.3 MG/DL
BUN SERPL-MCNC: 16.2 MG/DL (ref 6–20)
CALCIUM SERPL-MCNC: 10.1 MG/DL (ref 8.6–10)
CHLORIDE SERPL-SCNC: 98 MMOL/L (ref 98–107)
CREAT SERPL-MCNC: 0.99 MG/DL (ref 0.51–0.95)
DEPRECATED HCO3 PLAS-SCNC: 19 MMOL/L (ref 22–29)
EGFRCR SERPLBLD CKD-EPI 2021: 70 ML/MIN/1.73M2
EOSINOPHIL # BLD AUTO: 0.2 10E3/UL (ref 0–0.7)
EOSINOPHIL NFR BLD AUTO: 1 %
ERYTHROCYTE [DISTWIDTH] IN BLOOD BY AUTOMATED COUNT: 15.8 % (ref 10–15)
GLUCOSE SERPL-MCNC: 166 MG/DL (ref 70–99)
HBA1C MFR BLD: 6.5 % (ref 0–5.6)
HCT VFR BLD AUTO: 42.5 % (ref 35–47)
HGB BLD-MCNC: 13.6 G/DL (ref 11.7–15.7)
IMM GRANULOCYTES # BLD: 0.1 10E3/UL
IMM GRANULOCYTES NFR BLD: 1 %
LYMPHOCYTES # BLD AUTO: 2.6 10E3/UL (ref 0.8–5.3)
LYMPHOCYTES NFR BLD AUTO: 22 %
MAGNESIUM SERPL-MCNC: 2 MG/DL (ref 1.7–2.3)
MCH RBC QN AUTO: 26.9 PG (ref 26.5–33)
MCHC RBC AUTO-ENTMCNC: 32 G/DL (ref 31.5–36.5)
MCV RBC AUTO: 84 FL (ref 78–100)
MONOCYTES # BLD AUTO: 0.8 10E3/UL (ref 0–1.3)
MONOCYTES NFR BLD AUTO: 7 %
NEUTROPHILS # BLD AUTO: 8.3 10E3/UL (ref 1.6–8.3)
NEUTROPHILS NFR BLD AUTO: 69 %
PHOSPHATE SERPL-MCNC: 3.8 MG/DL (ref 2.5–4.5)
PLATELET # BLD AUTO: 448 10E3/UL (ref 150–450)
POTASSIUM SERPL-SCNC: 5.2 MMOL/L (ref 3.4–5.3)
PROT SERPL-MCNC: 8.1 G/DL (ref 6.4–8.3)
RBC # BLD AUTO: 5.06 10E6/UL (ref 3.8–5.2)
SODIUM SERPL-SCNC: 133 MMOL/L (ref 135–145)
WBC # BLD AUTO: 12 10E3/UL (ref 4–11)

## 2023-10-25 PROCEDURE — 83735 ASSAY OF MAGNESIUM: CPT

## 2023-10-25 PROCEDURE — 84100 ASSAY OF PHOSPHORUS: CPT

## 2023-10-25 PROCEDURE — 80053 COMPREHEN METABOLIC PANEL: CPT

## 2023-10-25 PROCEDURE — 83036 HEMOGLOBIN GLYCOSYLATED A1C: CPT

## 2023-10-25 PROCEDURE — 36415 COLL VENOUS BLD VENIPUNCTURE: CPT

## 2023-10-25 PROCEDURE — 85025 COMPLETE CBC W/AUTO DIFF WBC: CPT

## 2023-10-25 ASSESSMENT — ACTIVITIES OF DAILY LIVING (ADL): DEPENDENT_IADLS:: INDEPENDENT

## 2023-10-25 NOTE — TELEPHONE ENCOUNTER
Patient patient and informed her of Dr. Naidu's recommendation.    Patient is tearful and not wanting to go back to the ER and not have things properly addressed.     Patient said she would go to the ER and be evaluated any ways.    Rosemary Borden RN on 10/25/2023 at 5:36 PM

## 2023-10-25 NOTE — PROGRESS NOTES
Clinic Care Coordination Contact  Clinic Care Coordination Contact  OUTREACH    Referral Information:  Referral Source: ED Follow-Up    Primary Diagnosis: GI Disorders    Chief Complaint   Patient presents with    Clinic Care Coordination - [facility]     Clinic Care Coordination RN        .   Universal Utilization: ED visit 10/23-10/24  presents with nausea and concern for high output in her ostomy.   Clinic Utilization  Difficulty keeping appointments:: No  Compliance Concerns: No  No-Show Concerns: No  No PCP office visit in Past Year: No  Utilization      No Show Count (past year)  3             ED Visits  7             Hospital Admissions  2                    Current as of: 10/25/2023  8:27 AM                Clinical Concerns:  Current Medical Concerns:  Patient reports she is very sick and nauseated .    Patient continues to have high output in colostomy bag.  Patient states her Colon  surgeon is arranging IV hydration through home infusion  Patient had lab work this afternoon     Current Behavioral Concerns: No      Pain  Pain (GOAL):: No  Health Maintenance Reviewed: Not assessed  Clinical Pathway: None    Medication Management:  Medication review status: Medications reviewed and no changes reported per patient.             Functional Status:  Dependent ADLs:: Independent  Dependent IADLs:: Independent  Bed or wheelchair confined:: No  Mobility Status: Independent    Living Situation:  Current living arrangement:: I live in a private home with family    Lifestyle & Psychosocial Needs:    Social Determinants of Health     Food Insecurity: No Food Insecurity (6/28/2021)    Hunger Vital Sign     Worried About Running Out of Food in the Last Year: Never true     Ran Out of Food in the Last Year: Never true   Depression: Not at risk (8/22/2023)    PHQ-2     PHQ-2 Score: 0   Recent Concern: Depression - At risk (7/11/2023)    PHQ-2     PHQ-2 Score: 6   Housing Stability: Not on file   Tobacco Use: Medium Risk  (10/10/2023)    Patient History     Smoking Tobacco Use: Former     Smokeless Tobacco Use: Never     Passive Exposure: Not on file   Financial Resource Strain: High Risk (6/28/2021)    Overall Financial Resource Strain (CARDIA)     Difficulty of Paying Living Expenses: Very hard   Alcohol Use: Not on file   Transportation Needs: No Transportation Needs (6/28/2021)    PRAPARE - Transportation     Lack of Transportation (Medical): No     Lack of Transportation (Non-Medical): No   Physical Activity: Not on file   Interpersonal Safety: Unknown (6/25/2021)    Humiliation, Afraid, Rape, and Kick questionnaire     Fear of Current or Ex-Partner: Not asked     Emotionally Abused: Not asked     Physically Abused: Not asked     Sexually Abused: Not asked   Stress: Stress Concern Present (6/25/2021)    Citizen of [place] [employer] - Occupational Stress Questionnaire     Feeling of Stress : Very much   Social Connections: Unknown (6/25/2021)    Social Connection and Isolation Panel [NHANES]     Frequency of Communication with Friends and Family: Not asked     Frequency of Social Gatherings with Friends and Family: Not asked     Attends Temple Services: Not asked     Active Member of Clubs or Organizations: Not asked     Attends Club or Organization Meetings: Not asked     Marital Status:      Diet:: Regular  Inadequate nutrition (GOAL):: No  Tube Feeding: No  Inadequate activity/exercise (GOAL):: No  Significant changes in sleep pattern (GOAL): No        Temple or spiritual beliefs that impact treatment:: No  Mental health DX:: Yes  Mental health DX how managed:: Medication  Mental health management concern (GOAL):: No  Chemical Dependency Status: No Current Concerns  Informal Support system:: Children             Resources and Interventions:  Current Resources:      Community Resources: None  Supplies Currently Used at Home: None  Equipment Currently Used at Home: none  Employment Status: employed  full-time         Advance Care Plan/Directive  Advanced Care Plans/Directives on file:: Yes  Type Advanced Care Plans/Directives: Advanced Directive - On File    Referrals Placed: None         Care Plan:  Care Plan: General       Problem: HP GENERAL PROBLEM       Goal: I will have a good plan for wound care in the next week       Start Date: 9/15/2023 Expected End Date: 11/30/2023    This Visit's Progress: 60% Recent Progress: 50%    Note:     Barriers: Anxiety  Unable to find home care services   Strengths: lives with daughter   Patient expressed understanding of goal: Yes   Action steps to achieve this goal:  1. I will have a Community Paramedic visit Not needed   2. Care coordinator will call surgeons office and ostomy RN for supplies   3. I will keep wound clinic appointments                                 Patient/Caregiver understanding: Patient expresses good understanding of plan     Outreach Frequency: weekly, more frequently as needed  Future Appointments                In 1 week [place] Huffman RN [facility] [place] [facility]    In 3 weeks [place] Huffman RN [facility] [place] [facility] [place]    In 1 month Quinton Ram MD [facility] Colon and Rectal Surgery Clinic [facility]    In 1 month [facility] GIGU RAD; [id number] [facility] Imaging Center Xray [facility]    In 1 month Kailee Keenan APRN CNP [facility] Preoperative Assessment Center [facility]    In 1 month [facility] LAB [facility] Lab [facility]    In 2 months Mariela Crandall PA-C [facility] Colon and Rectal Surgery Clinic [facility]    In 3 months Quinton Ram MD [facility] Colon and Rectal Surgery Clinic [facility]            Plan:   Patient will keep Wound Clinic appointment 11/1/2023  CC RN will follow up in 1-2 business days     [facility]   Gisel Marte RN, Care  Coordinator   [facility]'s   E-mail [email or web address]   [phone]

## 2023-10-25 NOTE — TELEPHONE ENCOUNTER
Patient calling with ongoing issues that were addressed 10/23/23 at the ER. Labs were drawn at that time.    Shortness of breath   Stomach pain  Nausea  No energy and very tired  Racing heart rate with walking  Ostomy output 1600 mL that is pure water  Sweats a lot all of the time but has no thermometer to check her temperature.  chills  Rectal issues 3-4 times per hour she goes to the bathroom and large amounts of mucus comes out that ranges in color from clear to brown to green    Patient reports there is something not right and she feels really sick.    Patient has labs scheduled today at 2:00 pm and patient is asking if Dr. Naidu could see her today.     RN informed patient Dr. Naidu's schedule is full and the level of triage recommends patient to go back to the ER to be evaluated.    Patient would like Dr. Naidu to please evaluate and provide recommendations.    Reason for Disposition   Patient sounds very sick or weak to the triager    Additional Information   Negative: SEVERE difficulty breathing (e.g., struggling for each breath, speaks in single words, pulse > 120)   Negative: Breathing stopped and hasn't returned   Negative: Choking on something   Negative: Bluish (or gray) lips or face   Negative: Difficult to awaken or acting confused (e.g., disoriented, slurred speech)   Negative: Passed out (i.e., fainted, collapsed and was not responding)   Negative: Wheezing started suddenly after medicine, an allergic food, or bee sting   Negative: Stridor (harsh sound while breathing in)   Negative: Slow, shallow and weak breathing   Negative: Sounds like a life-threatening emergency to the triager   Negative: Chest pain   Negative: Wheezing (high pitched whistling sound) and previous asthma attacks or use of asthma medicines   Negative: Difficulty breathing and within 14 days of COVID-19 Exposure   Negative: Difficulty breathing and only present when coughing   Negative: Difficulty breathing and only from  "stuffy nose   Negative: Difficulty breathing and only from stuffy nose or runny nose from common cold   Negative: Fever > 103 F (39.4 C)   Negative: Fever > 101 F (38.3 C) and over 60 years of age   Negative: Fever > 100.0 F (37.8 C) and bedridden (e.g., nursing home patient, stroke, chronic illness, recovering from surgery)   Negative: Fever > 100.0 F (37.8 C) and diabetes mellitus or weak immune system (e.g., HIV positive, cancer chemo, splenectomy, organ transplant, chronic steroids)   Negative: Periods where breathing stops and then resumes normally and bedridden (e.g., nursing home patient, CVA)   Negative: Pregnant or postpartum (from 0 to 6 weeks after delivery)   Negative: MODERATE difficulty breathing (e.g., speaks in phrases, SOB even at rest, pulse 100-120) of new-onset or worse than normal   Negative: Oxygen level (e.g., pulse oximetry) 90% or lower   Negative: Wheezing can be heard across the room   Negative: Drooling or spitting out saliva (because can't swallow)   Negative: Any history of prior \"blood clot\" in leg or lungs   Negative: Illness requiring prolonged bedrest in past month (e.g., immobilization, long hospital stay)   Negative: Hip or leg fracture (broken bone) in past month (or had cast on leg or ankle in past month)   Negative: Major surgery in the past month   Negative: Long-distance travel in past month (e.g., car, bus, train, plane; with trip lasting 6 or more hours)   Negative: Cancer treatment in past six months (or has cancer now)   Negative: Extra heartbeats, irregular heart beating, or heart is beating very fast (i.e., 'palpitations')    Protocols used: Breathing Difficulty-A-[place]    "

## 2023-10-25 NOTE — TELEPHONE ENCOUNTER
Sorry, just got this message at the end of my day due to busy clinic day.    Agree that symptoms are concerning given what the nurse triage has discovered and ER is recommended.    Anna Naidu M.D.

## 2023-10-25 NOTE — PROGRESS NOTES
Therapy: IVF  Insurance: [employer]     Misc: Pt should be covered at 100% between both plans.    In reference to referral from  Colorectal clinic received on 10/25/23 to check for IVF coverage.    Please contact Intake with any questions, [phone] or In Basket pool,  [facility] ([phone]).

## 2023-10-26 ENCOUNTER — HOSPITAL ENCOUNTER (EMERGENCY)
Facility: CLINIC | Age: 48
Discharge: HOME | End: 2023-10-26
Attending: FAMILY MEDICINE | Admitting: FAMILY MEDICINE
Payer: COMMERCIAL

## 2023-10-26 VITALS
TEMPERATURE: 98.2 F | HEART RATE: 77 BPM | BODY MASS INDEX: 34.41 KG/M2 | RESPIRATION RATE: 18 BRPM | HEIGHT: 62 IN | OXYGEN SATURATION: 98 % | SYSTOLIC BLOOD PRESSURE: 109 MMHG | WEIGHT: 187 LBS | DIASTOLIC BLOOD PRESSURE: 76 MMHG

## 2023-10-26 DIAGNOSIS — R19.7 DIARRHEA, UNSPECIFIED TYPE: ICD-10-CM

## 2023-10-26 DIAGNOSIS — E86.0 DEHYDRATION: ICD-10-CM

## 2023-10-26 DIAGNOSIS — R10.84 ABDOMINAL PAIN, GENERALIZED: ICD-10-CM

## 2023-10-26 LAB
ALBUMIN SERPL BCG-MCNC: 4.8 G/DL (ref 3.5–5.2)
ALP SERPL-CCNC: 259 U/L (ref 35–104)
ALT SERPL W P-5'-P-CCNC: 197 U/L (ref 0–50)
ANION GAP SERPL CALCULATED.3IONS-SCNC: 17 MMOL/L (ref 7–15)
AST SERPL W P-5'-P-CCNC: 90 U/L (ref 0–45)
BASOPHILS # BLD AUTO: 0.1 10E3/UL (ref 0–0.2)
BASOPHILS NFR BLD AUTO: 0 %
BILIRUB SERPL-MCNC: 0.3 MG/DL
BUN SERPL-MCNC: 13.7 MG/DL (ref 6–20)
C DIFF TOX B STL QL: NEGATIVE
CALCIUM SERPL-MCNC: 10.4 MG/DL (ref 8.6–10)
CHLORIDE SERPL-SCNC: 96 MMOL/L (ref 98–107)
CREAT SERPL-MCNC: 0.99 MG/DL (ref 0.51–0.95)
DEPRECATED HCO3 PLAS-SCNC: 19 MMOL/L (ref 22–29)
EGFRCR SERPLBLD CKD-EPI 2021: 70 ML/MIN/1.73M2
EOSINOPHIL # BLD AUTO: 0.2 10E3/UL (ref 0–0.7)
EOSINOPHIL NFR BLD AUTO: 2 %
ERYTHROCYTE [DISTWIDTH] IN BLOOD BY AUTOMATED COUNT: 15.8 % (ref 10–15)
GLUCOSE SERPL-MCNC: 121 MG/DL (ref 70–99)
HCT VFR BLD AUTO: 41.5 % (ref 35–47)
HGB BLD-MCNC: 13.4 G/DL (ref 11.7–15.7)
HOLD SPECIMEN: NORMAL
IMM GRANULOCYTES # BLD: 0.1 10E3/UL
IMM GRANULOCYTES NFR BLD: 1 %
LYMPHOCYTES # BLD AUTO: 2.5 10E3/UL (ref 0.8–5.3)
LYMPHOCYTES NFR BLD AUTO: 21 %
MAGNESIUM SERPL-MCNC: 1.8 MG/DL (ref 1.7–2.3)
MCH RBC QN AUTO: 26.9 PG (ref 26.5–33)
MCHC RBC AUTO-ENTMCNC: 32.3 G/DL (ref 31.5–36.5)
MCV RBC AUTO: 83 FL (ref 78–100)
MONOCYTES # BLD AUTO: 0.9 10E3/UL (ref 0–1.3)
MONOCYTES NFR BLD AUTO: 8 %
NEUTROPHILS # BLD AUTO: 8.3 10E3/UL (ref 1.6–8.3)
NEUTROPHILS NFR BLD AUTO: 68 %
NRBC # BLD AUTO: 0 10E3/UL
NRBC BLD AUTO-RTO: 0 /100
NT-PROBNP SERPL-MCNC: <36 PG/ML (ref 0–450)
PLATELET # BLD AUTO: 489 10E3/UL (ref 150–450)
POTASSIUM SERPL-SCNC: 4.9 MMOL/L (ref 3.4–5.3)
PROT SERPL-MCNC: 8.4 G/DL (ref 6.4–8.3)
RBC # BLD AUTO: 4.98 10E6/UL (ref 3.8–5.2)
SODIUM SERPL-SCNC: 132 MMOL/L (ref 135–145)
TROPONIN T SERPL HS-MCNC: 7 NG/L
TSH SERPL DL<=0.005 MIU/L-ACNC: 4.02 UIU/ML (ref 0.3–4.2)
WBC # BLD AUTO: 12 10E3/UL (ref 4–11)

## 2023-10-26 PROCEDURE — 250N000011 HC RX IP 250 OP 636: Mod: JZ | Performed by: FAMILY MEDICINE

## 2023-10-26 PROCEDURE — 93005 ELECTROCARDIOGRAM TRACING: CPT | Performed by: FAMILY MEDICINE

## 2023-10-26 PROCEDURE — 84484 ASSAY OF TROPONIN QUANT: CPT | Performed by: FAMILY MEDICINE

## 2023-10-26 PROCEDURE — 87493 C DIFF AMPLIFIED PROBE: CPT | Performed by: FAMILY MEDICINE

## 2023-10-26 PROCEDURE — 99284 EMERGENCY DEPT VISIT MOD MDM: CPT | Mod: 25 | Performed by: FAMILY MEDICINE

## 2023-10-26 PROCEDURE — 80053 COMPREHEN METABOLIC PANEL: CPT | Performed by: FAMILY MEDICINE

## 2023-10-26 PROCEDURE — 96374 THER/PROPH/DIAG INJ IV PUSH: CPT | Performed by: FAMILY MEDICINE

## 2023-10-26 PROCEDURE — 83880 ASSAY OF NATRIURETIC PEPTIDE: CPT | Performed by: FAMILY MEDICINE

## 2023-10-26 PROCEDURE — 36415 COLL VENOUS BLD VENIPUNCTURE: CPT | Performed by: FAMILY MEDICINE

## 2023-10-26 PROCEDURE — 85025 COMPLETE CBC W/AUTO DIFF WBC: CPT | Performed by: FAMILY MEDICINE

## 2023-10-26 PROCEDURE — 83735 ASSAY OF MAGNESIUM: CPT | Performed by: FAMILY MEDICINE

## 2023-10-26 PROCEDURE — 84443 ASSAY THYROID STIM HORMONE: CPT | Performed by: FAMILY MEDICINE

## 2023-10-26 PROCEDURE — 93010 ELECTROCARDIOGRAM REPORT: CPT | Performed by: FAMILY MEDICINE

## 2023-10-26 PROCEDURE — 258N000003 HC RX IP 258 OP 636: Performed by: FAMILY MEDICINE

## 2023-10-26 PROCEDURE — 96361 HYDRATE IV INFUSION ADD-ON: CPT | Performed by: FAMILY MEDICINE

## 2023-10-26 RX ORDER — ONDANSETRON 2 MG/ML
4 INJECTION INTRAMUSCULAR; INTRAVENOUS EVERY 30 MIN PRN
Status: DISCONTINUED | OUTPATIENT
Start: 2023-10-26 | End: 2023-10-27 | Stop reason: HOSPADM

## 2023-10-26 RX ADMIN — SODIUM CHLORIDE, POTASSIUM CHLORIDE, SODIUM LACTATE AND CALCIUM CHLORIDE 1000 ML: 600; 310; 30; 20 INJECTION, SOLUTION INTRAVENOUS at 21:27

## 2023-10-26 RX ADMIN — ONDANSETRON 4 MG: 2 INJECTION INTRAMUSCULAR; INTRAVENOUS at 21:27

## 2023-10-26 RX ADMIN — SODIUM CHLORIDE, POTASSIUM CHLORIDE, SODIUM LACTATE AND CALCIUM CHLORIDE 1000 ML: 600; 310; 30; 20 INJECTION, SOLUTION INTRAVENOUS at 18:08

## 2023-10-26 ASSESSMENT — ACTIVITIES OF DAILY LIVING (ADL)
ADLS_ACUITY_SCORE: 37
ADLS_ACUITY_SCORE: 37

## 2023-10-26 NOTE — ED TRIAGE NOTES
Pt was seen here on Monday.  Pt states she is dehydrated and has an ostomy that is putting out 1600ml a day.  Sob started a few days ago.  Pt is nauseated.  PT was to have an infusionist come to her house but didn't feel she could wait any longer.

## 2023-10-26 NOTE — ED PROVIDER NOTES
History     Chief Complaint   Patient presents with    Dehydration    Breathing Problem     Pt we seen her on Monday for generalized weakness and sob, pt reports she did have blood work done yesterday but symptoms continue to worsen. Pt reports she gets short of breath walking short distances.      HPI    Doretha Yu is a 48 year old female who comes in with generalized weakness, fatigue, and concerns for dehydration.  She had a prior subtotal colectomy for ulcerative colitis and then in September had a proctectomy with creation of a J-pouch and diverting ileostomy.  She has been struggling with high output from her ileostomy and says she is putting out 1600 cc/day.  She has intermittent mild crampy abdominal pain but no focal persistent pain.  She has been having sweats and chills.  She has not measured a fever.  There has not been any blood coming out of her pouch.  She was here 3 days ago with similar symptoms and had IV fluids administered and had had an acute kidney injury noted on laboratory studies at that time.  Her surgeon is set her up for home infusion but that has not occurred yet and she feels out of breath just trying to get up to go to the bathroom.  She is taking low Paramed and using fiber without any relief of her symptoms.  She is due to have takedown of her ileostomy in December.  She is not short of breath at rest but becomes severely dyspneic with exertion.  She has no history of heart disease or heart failure.  She is not having chest pain, cough, asthma.  She has had C. difficile colitis in the past.    Allergies:  Allergies   Allergen Reactions    Bee Venom Swelling    Azithromycin Diarrhea    Erythromycin      Other reaction(s): GI intolerance, Vomiting    Fentanyl Other (See Comments)     sweating  sweating    Prochlorperazine Fatigue     Other reaction(s): Other (see comments)  Fatigue    Buspirone      Other reaction(s): GI intolerance  vomiting    Erythrocin Nausea and Vomiting     Gluten Meal      Celiac disease    Topamax [Topiramate]      Made her lethargic    Zithromax [Azithromycin Dihydrate] Diarrhea    Enbrel [Etanercept] Hives and Rash       Problem List:    Patient Active Problem List    Diagnosis Date Noted    Post-op pain 09/13/2023     Priority: Medium    Gastroesophageal reflux disease without esophagitis 09/05/2023     Priority: Medium    Biliary dyskinesia 01/11/2023     Priority: Medium    Migraines      Priority: Medium    Colostomy in place (H) 07/16/2021     Priority: Medium    S/P colectomy 07/16/2021     Priority: Medium    Ulcerative colitis without complications, unspecified location (H) 06/03/2021     Priority: Medium    Dehydration 04/13/2021     Priority: Medium    Hematochezia 04/13/2021     Priority: Medium    Diarrhea of infectious origin 04/13/2021     Priority: Medium    C. difficile colitis 04/13/2021     Priority: Medium    Adjustment disorder with mixed emotional features 06/16/2020     Priority: Medium    Polyarthralgia 04/29/2020     Priority: Medium    Drug-induced polyneuropathy (H24) 04/29/2020     Priority: Medium    Pain syndrome, chronic 02/12/2020     Priority: Medium    Drug-induced Cushing's syndrome (H24) 02/12/2020     Priority: Medium    Diabetes mellitus, iatrogenic (H) 01/28/2020     Priority: Medium    High risk HPV infection 01/28/2020     Priority: Medium     Added automatically from request for surgery [id number]      Immunosuppression (H24) 01/28/2020     Priority: Medium     Added automatically from request for surgery [id number]      STORMY (obstructive sleep apnea) 01/27/2020     Priority: Medium     1/2019 (241#)-AHI 24, lowest oxygen saturation was 86%, no periodic limb movement were noted, CPAP 8 cm/H20 was effective.      Secondary lymphedema 01/27/2020     Priority: Medium    Chronic neutrophilia 01/27/2020     Priority: Medium    Cervical high risk HPV (human papillomavirus) test positive 12/13/2019     Priority: Medium     2011 NIL  pap.  2015 NIL pap, Neg HPV.  12/13/19 NIL pap , + HR HPV 16. Plan colp.   1/6/19 Failed colp exam secondary to anatomic constraints with gyn. Referred to gyn/onc.   2/25/20 Colpo bx and ECC Negative for dysplasia. Plan cotest in 1 year.   8/20/21 NIL pap, Neg HPV. Plan cotest in 1 year per provider.   9/28/22 Lost to follow-up for pap tracking       Immunosuppressed status (H24) 09/05/2019     Priority: Medium    Ulcerative colitis with complication, unspecified location (H) 09/05/2019     Priority: Medium    Morbid obesity (H) 09/05/2019     Priority: Medium    Arthritis, rheumatoid (H) 11/26/2018     Priority: Medium    Hypocalcemia 05/15/2018     Priority: Medium    Anemia 05/14/2018     Priority: Medium    Menstrual irregularity 05/14/2018     Priority: Medium    Arthralgia of both knees 05/12/2018     Priority: Medium     Formatting of this note might be different from the original.  Work-up in progress. There is concern for inflammatory arthritis.      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     Priority: Medium    Malignant melanoma of left upper extremity including shoulder (H) 10/12/2017     Priority: Medium    Metastatic malignant melanoma (H) 10/10/2017     Priority: Medium    Prothrombin mutation (H24) 04/05/2017     Priority: Medium     On daily aspirin 81 mg per hematology's recommendations from 2008      Vision changes 04/01/2017     Priority: Medium    Mild intermittent  asthma without complication 11/08/2013     Priority: Medium    Moderate major depression (H) 06/24/2013     Priority: Medium    Anxiety state 09/13/2012     Priority: Medium     Problem list name updated by automated process. Provider to review      Esophageal reflux 09/13/2012     Priority: Medium    DUB (dysfunctional uterine bleeding) 07/28/2011     Priority: Medium    CARDIOVASCULAR SCREENING; LDL GOAL LESS THAN 160 07/28/2011     Priority: Low        Past Medical History:    Past Medical History:   Diagnosis Date    Abnormal MRI     Anxiety     Basal cell carcinoma     Cervical high risk HPV (human papillomavirus) test positive 12/13/2019    Colitis     Depression     Diabetes mellitus, iatrogenic (H) 01/28/2020    Esophageal reflux     Inflammatory arthritis     Insomnia     Intestinal giardiasis 03/05/2018    Lumbago     Lymphedema     Malignant melanoma (H)     Melanoma (H) 10/23/2017    Migraines     Mild persistent asthma     Morbid obesity with BMI of 40.0-44.9, adult (H)     STORMY (obstructive sleep apnea)     Prothrombin deficiency (H)     Stroke (cerebrum) (H) 2004    TIA (transient ischemic attack) 2004    Type 2 diabetes mellitus (H)     Ulcerative pancolitis (H)        Past Surgical History:    Past Surgical History:   Procedure Laterality Date    APPENDECTOMY  2004    COLONOSCOPY N/A 10/18/2017    Procedure: COLONOSCOPY;  Colon;  Surgeon: Debbie Stephens MD;  Location: [facility]    COLONOSCOPY N/A 03/09/2018    Procedure: COMBINED COLONOSCOPY, SINGLE OR MULTIPLE BIOPSY/POLYPECTOMY BY BIOPSY;  colon;  Surgeon: Benita Schumacher MD;  Location: [facility]    COLONOSCOPY      multiple since 2018 to present - about 6 total    DAVINCI ASSISTED TRANSANAL TOTAL MESORECTAL EXCISION N/A 9/5/2023    Procedure: COMPLETION PROCTECTOMY, ROBOT-ASSISTED, ILEAL POUCH ANASTAMOSIS;  Surgeon: Quinton Ram MD;  Location: [facility]    DISSECT LYMPH NODE AXILLA Left 10/23/2017    Procedure: DISSECT LYMPH NODE AXILLA;  Left  Axillary Lymph Node Dissection ;  Surgeon: Laurent Cool MD;  Location: [facility]    EXAM UNDER ANESTHESIA PELVIC N/A 2020    Procedure: EXAM UNDER ANESTHESIA, PELVIS; with Cervical Biopsies, Vaginal Biopsy and Endocervical Curettings;  Surgeon: Melina Jung MD;  Location: [facility]    GYN SURGERY  ,         LAPAROSCOPIC ASSISTED COLECTOMY N/A 06/15/2021    Procedure: laparoscopic total abdominal colectomy, end ileostomy;  Surgeon: Quinton Ram MD;  Location: [facility]    REPAIR MOHS Left 2017    Procedure: REPAIR MOHS;  Left Upper Lid Moh's Reconstruction;  Surgeon: Kisha Bosch MD;  Location: [facility]    SIGMOIDOSCOPY FLEXIBLE N/A 2023    Procedure: Sigmoidoscopy flexible;  Surgeon: Quinton Ram MD;  Location: [facility]       Family History:    Family History   Problem Relation Age of Onset    Cancer Mother 45        lung    Neurologic Disorder Mother         epilepsy    Lipids Father     Gastrointestinal Disease Father         diverticulitis     Depression Father     Colitis Father     Colon Cancer Father     Diverticulitis Father     Depression Sister     Cancer Maternal Grandmother     Blood Disease Maternal Grandmother         lymphoma     Arthritis Maternal Grandmother     Diabetes Maternal Grandmother     Depression Maternal Grandmother     Macular Degeneration Maternal Grandmother     Glaucoma Maternal Grandmother     Diabetes Maternal Grandfather     Cerebrovascular Disease Maternal Grandfather     Blood Disease Maternal Grandfather     Heart Disease Maternal Grandfather     Glaucoma Maternal Grandfather     Cancer Paternal Grandmother     Cancer - colorectal Paternal Grandmother     Colitis Paternal Grandmother     Colon Cancer Paternal Grandmother     Diverticulitis Paternal Grandmother     Respiratory Paternal Grandfather         emphysema     Colitis Paternal Grandfather     Colon Cancer Paternal Grandfather     Diverticulitis Paternal Grandfather   "   Heart Disease Daughter     Asthma Daughter     Melanoma No family hx of     Anesthesia Reaction No family hx of     Clotting Disorder No family hx of        Social History:  Marital Status:   [4]  Social History     Tobacco Use    Smoking status: Former     Packs/day: 1.00     Years: 5.00     Additional pack years: 0.00     Total pack years: 5.00     Types: Cigarettes     Quit date: 3/20/1998     Years since quittin.6    Smokeless tobacco: Never   Vaping Use    Vaping Use: Never used   Substance Use Topics    Alcohol use: Not Currently    Drug use: Yes     Types: Marijuana     Comment: vape, edible        Medications:    acetaminophen (TYLENOL) 325 MG tablet  albuterol (PROAIR HFA/PROVENTIL HFA/VENTOLIN HFA) 108 (90 Base) MCG/ACT inhaler  diphenoxylate-atropine (LOMOTIL) 2.5-0.025 MG tablet  famotidine (PEPCID) 20 MG tablet  hydrOXYzine (ATARAX) 25 MG tablet  loperamide (IMODIUM A-D) 2 MG tablet  loperamide (IMODIUM A-D) 2 MG tablet  medical cannabis (Patient's own supply)  menthol (ICY HOT) 5 % PTCH  menthol-zinc oxide (CALMOSEPTINE) 0.44-20.6 % OINT ointment  metFORMIN (GLUCOPHAGE) 500 MG tablet  methocarbamol (ROBAXIN) 750 MG tablet  ondansetron (ZOFRAN ODT) 4 MG ODT tab  ondansetron (ZOFRAN ODT) 4 MG ODT tab  Ostomy Supplies MISC  oxyCODONE (ROXICODONE) 5 MG tablet  phentermine (ADIPEX-P) 15 MG capsule  tolterodine ER (DETROL LA) 2 MG 24 hr capsule  venlafaxine (EFFEXOR) 50 MG tablet  venlafaxine (EFFEXOR) 75 MG tablet          Review of Systems    All other systems are reviewed and are negative    Physical Exam   BP: 114/83  Pulse: 104  Temp: 98.2  F (36.8  C)  Resp: 18  Height: 157.5 cm (5' 2\")  Weight: 84.8 kg (187 lb)  SpO2: 100 %      Physical Exam    Nursing note and vitals were reviewed.  Constitutional: Awake and alert, adequately nourished and developed appearing 48-year-old in mild discomfort, who answers questions appropriately and cooperates with examination.  HEENT: EOMI.   Neck: " Freely mobile.  Cardiovascular: Cardiac examination reveals normal heart rate and regular rhythm without murmur.  Pulmonary/Chest: Breathing is unlabored.  Breath sounds are clear and equal bilaterally.  There no retractions, tachypnea, rales, wheezes, or rhonchi.  Abdomen: Soft, nontender, no HSM or masses rebound or guarding.  Musculoskeletal: Extremities are warm and well-perfused and without edema  Neurological: Alert, oriented, thought content logical, coherent   Skin: Warm, dry, no rashes.  Psychiatric: Affect broad and appropriate.    ED Course                 Procedures              EKG Interpretation:      Interpreted by Irwin Mcfarland MD  Time reviewed: 21:18  Symptoms at time of EKG: chest heaviness   Rhythm: normal sinus   Rate: normal  Axis: normal  Ectopy: none  Conduction: normal  ST Segments/ T Waves: No ST-T wave changes  Q Waves: none  Comparison to prior: Unchanged from 1/10/23    Clinical Impression: normal EKG      Critical Care time:  none               Results for orders placed or performed during the hospital encounter of 10/26/23 (from the past 24 hour(s))   [facility] Draw    Narrative    The following orders were created for panel order [facility] Draw.  Procedure                               Abnormality         Status                     ---------                               -----------         ------                     Extra Blue Top Tube[[id number]]                              Final result               Extra Red Top Tube[[id number]]                               Final result               Extra Green Top (Lithium...[[id number]]                      Final result               Extra Purple Top Tube[[id number]]                            Final result                 Please view results for these tests on the individual orders.   Extra Blue Top Tube   Result Value Ref Range    Hold Specimen [facility]    Extra Red Top Tube   Result Value Ref Range    Hold Specimen [facility]    Extra Green Top (Lithium  Heparin) Tube   Result Value Ref Range    Hold Specimen [facility]    Extra Purple Top Tube   Result Value Ref Range    Hold Specimen     CBC with platelets differential    Narrative    The following orders were created for panel order CBC with platelets differential.  Procedure                               Abnormality         Status                     ---------                               -----------         ------                     CBC with platelets and d...[[id number]]  Abnormal            Final result                 Please view results for these tests on the individual orders.   Comprehensive metabolic panel   Result Value Ref Range    Sodium 132 (L) 135 - 145 mmol/L    Potassium 4.9 3.4 - 5.3 mmol/L    Carbon Dioxide (CO2) 19 (L) 22 - 29 mmol/L    Anion Gap 17 (H) 7 - 15 mmol/L    Urea Nitrogen 13.7 6.0 - 20.0 mg/dL    Creatinine 0.99 (H) 0.51 - 0.95 mg/dL    GFR Estimate 70 >60 mL/min/1.73m2    Calcium 10.4 (H) 8.6 - 10.0 mg/dL    Chloride 96 (L) 98 - 107 mmol/L    Glucose 121 (H) 70 - 99 mg/dL    Alkaline Phosphatase 259 (H) 35 - 104 U/L    AST 90 (H) 0 - 45 U/L     (H) 0 - 50 U/L    Protein Total 8.4 (H) 6.4 - 8.3 g/dL    Albumin 4.8 3.5 - 5.2 g/dL    Bilirubin Total 0.3 <=1.2 mg/dL   CBC with platelets and differential   Result Value Ref Range    WBC Count 12.0 (H) 4.0 - 11.0 10e3/uL    RBC Count 4.98 3.80 - 5.20 10e6/uL    Hemoglobin 13.4 11.7 - 15.7 g/dL    Hematocrit 41.5 35.0 - 47.0 %    MCV 83 78 - 100 fL    MCH 26.9 26.5 - 33.0 pg    MCHC 32.3 31.5 - 36.5 g/dL    RDW 15.8 (H) 10.0 - 15.0 %    Platelet Count 489 (H) 150 - 450 10e3/uL    % Neutrophils 68 %    % Lymphocytes 21 %    % Monocytes 8 %    % Eosinophils 2 %    % Basophils 0 %    % Immature Granulocytes 1 %    NRBCs per 100 WBC 0 <1 /100    Absolute Neutrophils 8.3 1.6 - 8.3 10e3/uL    Absolute Lymphocytes 2.5 0.8 - 5.3 10e3/uL    Absolute Monocytes 0.9 0.0 - 1.3 10e3/uL    Absolute Eosinophils 0.2 0.0 - 0.7 10e3/uL    Absolute Basophils  0.1 0.0 - 0.2 10e3/uL    Absolute Immature Granulocytes 0.1 <=0.4 10e3/uL    Absolute NRBCs 0.0 10e3/uL   TSH with free T4 reflex   Result Value Ref Range    TSH 4.02 0.30 - 4.20 uIU/mL   Magnesium   Result Value Ref Range    Magnesium 1.8 1.7 - 2.3 mg/dL   Nt probnp inpatient (BNP)   Result Value Ref Range    N terminal Pro BNP Inpatient <36 0 - 450 pg/mL   Troponin T, High Sensitivity   Result Value Ref Range    Troponin T, High Sensitivity 7 <=14 ng/L     *Note: Due to a large number of results and/or encounters for the requested time period, some results have not been displayed. A complete set of results can be found in Results Review.       Medications   ondansetron (ZOFRAN) injection 4 mg (4 mg Intravenous $Given 10/26/23 2127)   lactated ringers BOLUS 1,000 mL (0 mLs Intravenous Stopped 10/26/23 1922)   lactated ringers BOLUS 1,000 mL (0 mLs Intravenous Stopped 10/26/23 2235)     21:00: Patient reassessed.  She says she is not feeling much better.  She said that she is scared to go home because she is not sure what is wrong with her.  We reviewed that her BMP, BNP, TSH, are all reassuring with improvement in her renal function and just mild elevations of her transaminases.  Thyroid function is normal.  She still feels nauseated and feels a sense of pressure on her chest.  We will check an EKG and a troponin.  We will give her an additional antiemetics and more fluids and make a disposition plan after that.  3 days ago her CT of the chest abdomen pelvis did not show a cause for her symptoms and in July she had an ultrasound of her gallbladder that was normal.    Assessments & Plan (with Medical Decision Making)     48-year-old female status post colectomy with an ileostomy presented with concerns for dehydration and high output from her ostomy with recent biochemical evidence of dehydration.  She received 2 L of Ringer's lactate in the emergency department and had a repeat of her laboratory studies which show  improvement from 3 days ago.  Her abdominal examination was benign and I did not feel additional imaging was warranted at this time.  Her nausea was treated with antiemetics and improved.  She had no vomiting and no fever and normal vital signs.  She complained of pressure in her chest and an EKG was normal, troponin was normal and with her feeling of dyspnea on exertion a BNP was obtained and also normal.  It is uncertain what the cause of her high output from her ileostomy is.  I have sent a specimen to test for C. difficile.  She has had this in the past.  She has a mildly elevated white blood cell count and she has intermittent crampy abdominal pain.  Her transaminases and alk phos are mildly elevated but improved from her levels 3 days ago and prior to that time.  Her CT 3 days ago did not show a cause for this and a gallbladder ultrasound in July was normal.  This can continue to be monitored and does not need additional evaluation in the ED today.  I do not think she requires admission at this time and she is comfortable with discharge home.  She has planned follow-up with her surgeon and has been referred to GI.  If her C. difficile test is positive, I would treat this.  Otherwise she should return if she develops a fever, vomiting, focal persistent pain, or other worrisome symptoms.    I have reviewed the nursing notes.    I have reviewed the findings, diagnosis, plan and need for follow up with the patient.         New Prescriptions    No medications on file       Final diagnoses:   Dehydration   Abdominal pain, generalized   Diarrhea, unspecified type       10/26/2023   [facility] EMERGENCY DEPT       Irwin Mcfarland MD  10/26/23 [id number]

## 2023-10-27 ENCOUNTER — LAB REQUISITION (OUTPATIENT)
Dept: LAB | Facility: CLINIC | Age: 48
End: 2023-10-27
Payer: COMMERCIAL

## 2023-10-27 ENCOUNTER — PATIENT OUTREACH (OUTPATIENT)
Dept: CARE COORDINATION | Facility: CLINIC | Age: 48
End: 2023-10-27

## 2023-10-27 ENCOUNTER — TELEPHONE (OUTPATIENT)
Dept: GASTROENTEROLOGY | Facility: CLINIC | Age: 48
End: 2023-10-27
Payer: COMMERCIAL

## 2023-10-27 DIAGNOSIS — R11.0 NAUSEA: ICD-10-CM

## 2023-10-27 LAB
ALBUMIN SERPL BCG-MCNC: 3.9 G/DL (ref 3.5–5.2)
ALP SERPL-CCNC: 234 U/L (ref 35–104)
ALT SERPL W P-5'-P-CCNC: 147 U/L (ref 0–50)
ANION GAP SERPL CALCULATED.3IONS-SCNC: 18 MMOL/L (ref 7–15)
AST SERPL W P-5'-P-CCNC: 73 U/L (ref 0–45)
BILIRUB SERPL-MCNC: 0.2 MG/DL
BUN SERPL-MCNC: 14.7 MG/DL (ref 6–20)
CALCIUM SERPL-MCNC: 9.7 MG/DL (ref 8.6–10)
CHLORIDE SERPL-SCNC: 97 MMOL/L (ref 98–107)
CREAT SERPL-MCNC: 1.01 MG/DL (ref 0.51–0.95)
DEPRECATED HCO3 PLAS-SCNC: 18 MMOL/L (ref 22–29)
EGFRCR SERPLBLD CKD-EPI 2021: 69 ML/MIN/1.73M2
GLUCOSE SERPL-MCNC: 146 MG/DL (ref 70–99)
MAGNESIUM SERPL-MCNC: 1.6 MG/DL (ref 1.7–2.3)
PHOSPHATE SERPL-MCNC: 3.6 MG/DL (ref 2.5–4.5)
POTASSIUM SERPL-SCNC: 4.1 MMOL/L (ref 3.4–5.3)
PROT SERPL-MCNC: 7.3 G/DL (ref 6.4–8.3)
SODIUM SERPL-SCNC: 133 MMOL/L (ref 135–145)

## 2023-10-27 PROCEDURE — 80053 COMPREHEN METABOLIC PANEL: CPT | Performed by: SURGERY

## 2023-10-27 PROCEDURE — 84100 ASSAY OF PHOSPHORUS: CPT | Performed by: SURGERY

## 2023-10-27 PROCEDURE — 83735 ASSAY OF MAGNESIUM: CPT | Performed by: SURGERY

## 2023-10-27 ASSESSMENT — ACTIVITIES OF DAILY LIVING (ADL): DEPENDENT_IADLS:: INDEPENDENT

## 2023-10-27 NOTE — TELEPHONE ENCOUNTER
----- Message from Renard Davey MD sent at 10/27/2023 11:55 AM CDT -----  Adriel De Luna,    Will get her in.    Deven, can we get her in to see me, Zeferino or Beti within the next 2 weeks?    J  ----- Message -----  From: Annamarie Espinal RN  Sent: 10/27/2023   9:00 AM CDT  To: Estefani Grant RN; #    Hi,      this is the pt that Quinton had texted you about. Are you able to see her soon?     For a very very brief summary, she had UC s/p IPAA. She has had many complaints post surgery that we are managing but most recently she had had an elevation of AST/ALT/alk phos. She does have high ileostomy outputs. We are slowly increasing her antidiarrheals and have her on IV NS MWF. She is aware that she needs to see GI.      Thanks  Annamarie

## 2023-10-27 NOTE — DISCHARGE INSTRUCTIONS
Follow-up with your GI doctors and surgeons as planned.  Return to the emergency department if you develop a fever greater than 100.4, focal persistent abdominal pain, vomiting, or other worrisome symptoms.

## 2023-10-29 ENCOUNTER — TELEPHONE (OUTPATIENT)
Dept: SURGERY | Facility: CLINIC | Age: 48
End: 2023-10-29
Payer: COMMERCIAL

## 2023-10-29 NOTE — TELEPHONE ENCOUNTER
Patient called reporting continued high ileostomy output and new low-grade fever to 100.1 measured at home. Patient was recently seen in ED for dehydration and given 2L LR. Discharge instructions included to call if any new fever. She has been having chills and sweats for several weeks but this is the first recorded fever. She continued to have about 1.6L out of her ileostomy per day, mostly liquid stool. She feels dehydrated despite her home infusions and drinking a significant amount of fluids. Her urine is darker than normal as well. She denies any new cough, shortness of breath, or increased phlegm production. She does not believe she has any increased redness around her ostomy site.    - Recommended patient try 1L of her home infusion now and see if she feels better after that. If not, consider working with the home infusion company to get another 1L later today  - If symptoms persist despite this, can consider going to local ED for labs and additional fluids  - Will keep scheduled telehealth visit for tomorrow and discuss concerns regarding increased ostomy output with provider    Mario Godfrey MD  Surgery Resident

## 2023-10-30 ENCOUNTER — HOSPITAL ENCOUNTER (INPATIENT)
Facility: CLINIC | Age: 48
LOS: 9 days | Discharge: HOME | DRG: 385 | End: 2023-11-08
Attending: FAMILY MEDICINE | Admitting: STUDENT IN AN ORGANIZED HEALTH CARE EDUCATION/TRAINING PROGRAM
Payer: COMMERCIAL

## 2023-10-30 ENCOUNTER — VIRTUAL VISIT (OUTPATIENT)
Dept: GASTROENTEROLOGY | Facility: CLINIC | Age: 48
End: 2023-10-30
Payer: COMMERCIAL

## 2023-10-30 ENCOUNTER — APPOINTMENT (OUTPATIENT)
Dept: CT IMAGING | Facility: CLINIC | Age: 48
DRG: 385 | End: 2023-10-30
Attending: FAMILY MEDICINE
Payer: COMMERCIAL

## 2023-10-30 DIAGNOSIS — E86.0 DEHYDRATION: ICD-10-CM

## 2023-10-30 DIAGNOSIS — R79.89 ELEVATED LFTS: ICD-10-CM

## 2023-10-30 DIAGNOSIS — K21.9 GASTROESOPHAGEAL REFLUX DISEASE WITHOUT ESOPHAGITIS: ICD-10-CM

## 2023-10-30 DIAGNOSIS — K13.79 MOUTH SORES: ICD-10-CM

## 2023-10-30 DIAGNOSIS — K94.19 ALTERED BOWEL ELIMINATION DUE TO INTESTINAL OSTOMY (H): ICD-10-CM

## 2023-10-30 DIAGNOSIS — K51.90 ULCERATIVE COLITIS WITHOUT COMPLICATIONS, UNSPECIFIED LOCATION (H): Primary | ICD-10-CM

## 2023-10-30 DIAGNOSIS — Z93.2 HIGH OUTPUT ILEOSTOMY (H): ICD-10-CM

## 2023-10-30 DIAGNOSIS — F43.23 ADJUSTMENT DISORDER WITH MIXED ANXIETY AND DEPRESSED MOOD: Primary | ICD-10-CM

## 2023-10-30 DIAGNOSIS — R05.1 ACUTE COUGH: ICD-10-CM

## 2023-10-30 DIAGNOSIS — Z90.49 S/P COLECTOMY: ICD-10-CM

## 2023-10-30 DIAGNOSIS — I82.409 RECURRENT DEEP VEIN THROMBOSIS (H): ICD-10-CM

## 2023-10-30 DIAGNOSIS — K51.919 ULCERATIVE COLITIS WITH COMPLICATION, UNSPECIFIED LOCATION (H): ICD-10-CM

## 2023-10-30 DIAGNOSIS — F41.9 ANXIETY: ICD-10-CM

## 2023-10-30 DIAGNOSIS — Z93.9 HISTORY OF CREATION OF OSTOMY (H): ICD-10-CM

## 2023-10-30 DIAGNOSIS — D68.52 PROTHROMBIN MUTATION (H): ICD-10-CM

## 2023-10-30 DIAGNOSIS — R19.8 HIGH OUTPUT ILEOSTOMY (H): ICD-10-CM

## 2023-10-30 LAB
ALBUMIN SERPL BCG-MCNC: 3.8 G/DL (ref 3.5–5.2)
ALBUMIN UR-MCNC: 20 MG/DL
ALP SERPL-CCNC: 258 U/L (ref 35–104)
ALT SERPL W P-5'-P-CCNC: 146 U/L (ref 0–50)
AMYLASE SERPL-CCNC: 19 U/L (ref 28–100)
ANION GAP SERPL CALCULATED.3IONS-SCNC: 14 MMOL/L (ref 7–15)
APPEARANCE UR: ABNORMAL
APTT PPP: 29 SECONDS (ref 22–38)
AST SERPL W P-5'-P-CCNC: 92 U/L (ref 0–45)
BACTERIA #/AREA URNS HPF: ABNORMAL /HPF
BASOPHILS # BLD AUTO: 0 10E3/UL (ref 0–0.2)
BASOPHILS NFR BLD AUTO: 1 %
BILIRUB SERPL-MCNC: 0.2 MG/DL
BILIRUB UR QL STRIP: NEGATIVE
BUN SERPL-MCNC: 3.4 MG/DL (ref 6–20)
CALCIUM SERPL-MCNC: 8.8 MG/DL (ref 8.6–10)
CHLORIDE SERPL-SCNC: 96 MMOL/L (ref 98–107)
COLOR UR AUTO: ABNORMAL
CREAT SERPL-MCNC: 0.83 MG/DL (ref 0.51–0.95)
CRP SERPL-MCNC: 133 MG/L
DEPRECATED HCO3 PLAS-SCNC: 21 MMOL/L (ref 22–29)
EGFRCR SERPLBLD CKD-EPI 2021: 87 ML/MIN/1.73M2
EOSINOPHIL # BLD AUTO: 0 10E3/UL (ref 0–0.7)
EOSINOPHIL NFR BLD AUTO: 0 %
ERYTHROCYTE [DISTWIDTH] IN BLOOD BY AUTOMATED COUNT: 16.6 % (ref 10–15)
FLUAV RNA SPEC QL NAA+PROBE: NEGATIVE
FLUBV RNA RESP QL NAA+PROBE: NEGATIVE
GLUCOSE BLDC GLUCOMTR-MCNC: 122 MG/DL (ref 70–99)
GLUCOSE SERPL-MCNC: 132 MG/DL (ref 70–99)
GLUCOSE UR STRIP-MCNC: NEGATIVE MG/DL
HCT VFR BLD AUTO: 34.2 % (ref 35–47)
HGB BLD-MCNC: 11.3 G/DL (ref 11.7–15.7)
HGB UR QL STRIP: ABNORMAL
HOLD SPECIMEN: NORMAL
IMM GRANULOCYTES # BLD: 0.1 10E3/UL
IMM GRANULOCYTES NFR BLD: 2 %
INR PPP: 1.07 (ref 0.85–1.15)
KETONES UR STRIP-MCNC: NEGATIVE MG/DL
LACTATE SERPL-SCNC: 0.8 MMOL/L (ref 0.7–2)
LEUKOCYTE ESTERASE UR QL STRIP: ABNORMAL
LYMPHOCYTES # BLD AUTO: 1.7 10E3/UL (ref 0.8–5.3)
LYMPHOCYTES NFR BLD AUTO: 21 %
MAGNESIUM SERPL-MCNC: 1.6 MG/DL (ref 1.7–2.3)
MCH RBC QN AUTO: 27.3 PG (ref 26.5–33)
MCHC RBC AUTO-ENTMCNC: 33 G/DL (ref 31.5–36.5)
MCV RBC AUTO: 83 FL (ref 78–100)
MONOCYTES # BLD AUTO: 0.6 10E3/UL (ref 0–1.3)
MONOCYTES NFR BLD AUTO: 7 %
MUCOUS THREADS #/AREA URNS LPF: PRESENT /LPF
NEUTROPHILS # BLD AUTO: 5.7 10E3/UL (ref 1.6–8.3)
NEUTROPHILS NFR BLD AUTO: 69 %
NITRATE UR QL: NEGATIVE
NRBC # BLD AUTO: 0 10E3/UL
NRBC BLD AUTO-RTO: 0 /100
OSMOLALITY UR: 86 MMOL/KG (ref 100–1200)
PH UR STRIP: 6 [PH] (ref 5–7)
PLATELET # BLD AUTO: 294 10E3/UL (ref 150–450)
POTASSIUM SERPL-SCNC: 3.5 MMOL/L (ref 3.4–5.3)
PROT SERPL-MCNC: 7.1 G/DL (ref 6.4–8.3)
RBC # BLD AUTO: 4.14 10E6/UL (ref 3.8–5.2)
RBC URINE: 4 /HPF
RSV RNA SPEC NAA+PROBE: NEGATIVE
SARS-COV-2 RNA RESP QL NAA+PROBE: NEGATIVE
SODIUM SERPL-SCNC: 131 MMOL/L (ref 135–145)
SP GR UR STRIP: 1 (ref 1–1.03)
SQUAMOUS EPITHELIAL: <1 /HPF
TSH SERPL DL<=0.005 MIU/L-ACNC: 2.55 UIU/ML (ref 0.3–4.2)
UROBILINOGEN UR STRIP-MCNC: NORMAL MG/DL
WBC # BLD AUTO: 8.2 10E3/UL (ref 4–11)
WBC URINE: 6 /HPF

## 2023-10-30 PROCEDURE — 99417 PROLNG OP E/M EACH 15 MIN: CPT | Mod: VID | Performed by: DIETITIAN, REGISTERED

## 2023-10-30 PROCEDURE — 82962 GLUCOSE BLOOD TEST: CPT

## 2023-10-30 PROCEDURE — 83735 ASSAY OF MAGNESIUM: CPT | Performed by: FAMILY MEDICINE

## 2023-10-30 PROCEDURE — 83935 ASSAY OF URINE OSMOLALITY: CPT

## 2023-10-30 PROCEDURE — 96375 TX/PRO/DX INJ NEW DRUG ADDON: CPT | Performed by: FAMILY MEDICINE

## 2023-10-30 PROCEDURE — 85610 PROTHROMBIN TIME: CPT | Performed by: FAMILY MEDICINE

## 2023-10-30 PROCEDURE — 80053 COMPREHEN METABOLIC PANEL: CPT | Performed by: FAMILY MEDICINE

## 2023-10-30 PROCEDURE — 36415 COLL VENOUS BLD VENIPUNCTURE: CPT | Performed by: FAMILY MEDICINE

## 2023-10-30 PROCEDURE — 99223 1ST HOSP IP/OBS HIGH 75: CPT | Performed by: STUDENT IN AN ORGANIZED HEALTH CARE EDUCATION/TRAINING PROGRAM

## 2023-10-30 PROCEDURE — 85014 HEMATOCRIT: CPT | Performed by: FAMILY MEDICINE

## 2023-10-30 PROCEDURE — 120N000002 HC R&B MED SURG/OB UMMC

## 2023-10-30 PROCEDURE — 74177 CT ABD & PELVIS W/CONTRAST: CPT | Mod: 26 | Performed by: RADIOLOGY

## 2023-10-30 PROCEDURE — 84443 ASSAY THYROID STIM HORMONE: CPT | Performed by: FAMILY MEDICINE

## 2023-10-30 PROCEDURE — 87086 URINE CULTURE/COLONY COUNT: CPT | Performed by: FAMILY MEDICINE

## 2023-10-30 PROCEDURE — 86140 C-REACTIVE PROTEIN: CPT | Performed by: FAMILY MEDICINE

## 2023-10-30 PROCEDURE — 250N000011 HC RX IP 250 OP 636: Performed by: FAMILY MEDICINE

## 2023-10-30 PROCEDURE — 96374 THER/PROPH/DIAG INJ IV PUSH: CPT | Mod: 59 | Performed by: FAMILY MEDICINE

## 2023-10-30 PROCEDURE — 87637 SARSCOV2&INF A&B&RSV AMP PRB: CPT | Performed by: FAMILY MEDICINE

## 2023-10-30 PROCEDURE — 87040 BLOOD CULTURE FOR BACTERIA: CPT | Performed by: FAMILY MEDICINE

## 2023-10-30 PROCEDURE — 250N000011 HC RX IP 250 OP 636: Mod: JZ | Performed by: FAMILY MEDICINE

## 2023-10-30 PROCEDURE — 250N000011 HC RX IP 250 OP 636

## 2023-10-30 PROCEDURE — 81001 URINALYSIS AUTO W/SCOPE: CPT | Performed by: FAMILY MEDICINE

## 2023-10-30 PROCEDURE — 93005 ELECTROCARDIOGRAM TRACING: CPT | Performed by: FAMILY MEDICINE

## 2023-10-30 PROCEDURE — 74177 CT ABD & PELVIS W/CONTRAST: CPT

## 2023-10-30 PROCEDURE — 99285 EMERGENCY DEPT VISIT HI MDM: CPT | Mod: 25 | Performed by: FAMILY MEDICINE

## 2023-10-30 PROCEDURE — 258N000003 HC RX IP 258 OP 636: Performed by: FAMILY MEDICINE

## 2023-10-30 PROCEDURE — 83930 ASSAY OF BLOOD OSMOLALITY: CPT

## 2023-10-30 PROCEDURE — 36415 COLL VENOUS BLD VENIPUNCTURE: CPT

## 2023-10-30 PROCEDURE — 93010 ELECTROCARDIOGRAM REPORT: CPT | Performed by: FAMILY MEDICINE

## 2023-10-30 PROCEDURE — 250N000011 HC RX IP 250 OP 636: Mod: JZ

## 2023-10-30 PROCEDURE — 80074 ACUTE HEPATITIS PANEL: CPT

## 2023-10-30 PROCEDURE — 83605 ASSAY OF LACTIC ACID: CPT | Performed by: FAMILY MEDICINE

## 2023-10-30 PROCEDURE — 99215 OFFICE O/P EST HI 40 MIN: CPT | Mod: 24 | Performed by: DIETITIAN, REGISTERED

## 2023-10-30 PROCEDURE — 96361 HYDRATE IV INFUSION ADD-ON: CPT | Performed by: FAMILY MEDICINE

## 2023-10-30 PROCEDURE — C9113 INJ PANTOPRAZOLE SODIUM, VIA: HCPCS | Mod: JZ

## 2023-10-30 PROCEDURE — 250N000013 HC RX MED GY IP 250 OP 250 PS 637

## 2023-10-30 PROCEDURE — 82150 ASSAY OF AMYLASE: CPT | Performed by: FAMILY MEDICINE

## 2023-10-30 PROCEDURE — 85041 AUTOMATED RBC COUNT: CPT | Performed by: FAMILY MEDICINE

## 2023-10-30 PROCEDURE — 85730 THROMBOPLASTIN TIME PARTIAL: CPT | Performed by: FAMILY MEDICINE

## 2023-10-30 RX ORDER — HYDROMORPHONE HYDROCHLORIDE 1 MG/ML
0.5 INJECTION, SOLUTION INTRAMUSCULAR; INTRAVENOUS; SUBCUTANEOUS
Status: DISCONTINUED | OUTPATIENT
Start: 2023-10-30 | End: 2023-11-05

## 2023-10-30 RX ORDER — LANOLIN ALCOHOL/MO/W.PET/CERES
3 CREAM (GRAM) TOPICAL
Status: DISCONTINUED | OUTPATIENT
Start: 2023-10-30 | End: 2023-11-08 | Stop reason: HOSPADM

## 2023-10-30 RX ORDER — ACETAMINOPHEN 325 MG/1
650 TABLET ORAL EVERY 6 HOURS PRN
Status: DISCONTINUED | OUTPATIENT
Start: 2023-10-30 | End: 2023-11-08 | Stop reason: HOSPADM

## 2023-10-30 RX ORDER — FAMOTIDINE 20 MG/1
20 TABLET, FILM COATED ORAL 2 TIMES DAILY
Status: DISCONTINUED | OUTPATIENT
Start: 2023-10-30 | End: 2023-10-30

## 2023-10-30 RX ORDER — HYDROMORPHONE HYDROCHLORIDE 1 MG/ML
0.5 INJECTION, SOLUTION INTRAMUSCULAR; INTRAVENOUS; SUBCUTANEOUS ONCE
Status: COMPLETED | OUTPATIENT
Start: 2023-10-30 | End: 2023-10-30

## 2023-10-30 RX ORDER — VENLAFAXINE 37.5 MG/1
150 TABLET ORAL 2 TIMES DAILY
Status: DISCONTINUED | OUTPATIENT
Start: 2023-10-30 | End: 2023-11-08 | Stop reason: HOSPADM

## 2023-10-30 RX ORDER — SODIUM CHLORIDE, SODIUM LACTATE, POTASSIUM CHLORIDE, CALCIUM CHLORIDE 600; 310; 30; 20 MG/100ML; MG/100ML; MG/100ML; MG/100ML
1000 INJECTION, SOLUTION INTRAVENOUS CONTINUOUS
Status: DISCONTINUED | OUTPATIENT
Start: 2023-10-30 | End: 2023-11-01

## 2023-10-30 RX ORDER — HYDROMORPHONE HCL IN WATER/PF 6 MG/30 ML
.2-.5 PATIENT CONTROLLED ANALGESIA SYRINGE INTRAVENOUS
Status: DISCONTINUED | OUTPATIENT
Start: 2023-10-30 | End: 2023-10-30

## 2023-10-30 RX ORDER — ACETAMINOPHEN 650 MG/1
650 SUPPOSITORY RECTAL EVERY 6 HOURS PRN
Status: DISCONTINUED | OUTPATIENT
Start: 2023-10-30 | End: 2023-11-03

## 2023-10-30 RX ORDER — HYDROXYZINE HYDROCHLORIDE 25 MG/1
25 TABLET, FILM COATED ORAL 2 TIMES DAILY
Status: DISCONTINUED | OUTPATIENT
Start: 2023-10-30 | End: 2023-11-08 | Stop reason: HOSPADM

## 2023-10-30 RX ORDER — LOPERAMIDE HCL 2 MG
2 CAPSULE ORAL 4 TIMES DAILY PRN
Status: DISCONTINUED | OUTPATIENT
Start: 2023-10-30 | End: 2023-11-01

## 2023-10-30 RX ORDER — IOPAMIDOL 755 MG/ML
115 INJECTION, SOLUTION INTRAVASCULAR ONCE
Status: COMPLETED | OUTPATIENT
Start: 2023-10-30 | End: 2023-10-30

## 2023-10-30 RX ORDER — OXYCODONE HYDROCHLORIDE 5 MG/1
5 TABLET ORAL EVERY 4 HOURS PRN
Status: DISCONTINUED | OUTPATIENT
Start: 2023-10-30 | End: 2023-10-30

## 2023-10-30 RX ORDER — PIPERACILLIN SODIUM, TAZOBACTAM SODIUM 4; .5 G/20ML; G/20ML
4.5 INJECTION, POWDER, LYOPHILIZED, FOR SOLUTION INTRAVENOUS ONCE
Status: DISCONTINUED | OUTPATIENT
Start: 2023-10-30 | End: 2023-10-30

## 2023-10-30 RX ORDER — PIPERACILLIN SODIUM, TAZOBACTAM SODIUM 4; .5 G/20ML; G/20ML
4.5 INJECTION, POWDER, LYOPHILIZED, FOR SOLUTION INTRAVENOUS EVERY 6 HOURS
Status: COMPLETED | OUTPATIENT
Start: 2023-10-30 | End: 2023-11-03

## 2023-10-30 RX ORDER — DEXTROSE MONOHYDRATE 25 G/50ML
25-50 INJECTION, SOLUTION INTRAVENOUS
Status: DISCONTINUED | OUTPATIENT
Start: 2023-10-30 | End: 2023-11-08 | Stop reason: HOSPADM

## 2023-10-30 RX ORDER — NICOTINE POLACRILEX 4 MG
15-30 LOZENGE BUCCAL
Status: DISCONTINUED | OUTPATIENT
Start: 2023-10-30 | End: 2023-11-08 | Stop reason: HOSPADM

## 2023-10-30 RX ORDER — POLYETHYLENE GLYCOL 3350 17 G/17G
17 POWDER, FOR SOLUTION ORAL DAILY PRN
Status: DISCONTINUED | OUTPATIENT
Start: 2023-10-30 | End: 2023-11-01

## 2023-10-30 RX ORDER — ALBUTEROL SULFATE 90 UG/1
2 AEROSOL, METERED RESPIRATORY (INHALATION) EVERY 4 HOURS PRN
Status: DISCONTINUED | OUTPATIENT
Start: 2023-10-30 | End: 2023-11-08 | Stop reason: HOSPADM

## 2023-10-30 RX ORDER — HYDROMORPHONE HYDROCHLORIDE 1 MG/ML
0.5 INJECTION, SOLUTION INTRAMUSCULAR; INTRAVENOUS; SUBCUTANEOUS
Status: DISCONTINUED | OUTPATIENT
Start: 2023-10-30 | End: 2023-10-30

## 2023-10-30 RX ORDER — AMOXICILLIN 250 MG
2 CAPSULE ORAL 2 TIMES DAILY PRN
Status: DISCONTINUED | OUTPATIENT
Start: 2023-10-30 | End: 2023-11-01

## 2023-10-30 RX ORDER — AMOXICILLIN 250 MG
1 CAPSULE ORAL 2 TIMES DAILY PRN
Status: DISCONTINUED | OUTPATIENT
Start: 2023-10-30 | End: 2023-11-01

## 2023-10-30 RX ORDER — TOLTERODINE 2 MG/1
2-4 CAPSULE, EXTENDED RELEASE ORAL DAILY
Status: DISCONTINUED | OUTPATIENT
Start: 2023-10-30 | End: 2023-10-31

## 2023-10-30 RX ORDER — ONDANSETRON 2 MG/ML
4 INJECTION INTRAMUSCULAR; INTRAVENOUS EVERY 30 MIN PRN
Status: COMPLETED | OUTPATIENT
Start: 2023-10-30 | End: 2023-10-30

## 2023-10-30 RX ORDER — DOXYCYCLINE 100 MG/10ML
100 INJECTION, POWDER, LYOPHILIZED, FOR SOLUTION INTRAVENOUS EVERY 12 HOURS
Status: DISCONTINUED | OUTPATIENT
Start: 2023-10-30 | End: 2023-11-04

## 2023-10-30 RX ORDER — METHOCARBAMOL 750 MG/1
750 TABLET, FILM COATED ORAL 4 TIMES DAILY PRN
Status: DISCONTINUED | OUTPATIENT
Start: 2023-10-30 | End: 2023-11-08 | Stop reason: HOSPADM

## 2023-10-30 RX ORDER — LIDOCAINE 40 MG/G
CREAM TOPICAL
Status: DISCONTINUED | OUTPATIENT
Start: 2023-10-30 | End: 2023-11-08 | Stop reason: HOSPADM

## 2023-10-30 RX ADMIN — HYDROMORPHONE HYDROCHLORIDE 0.5 MG: 0.2 INJECTION, SOLUTION INTRAMUSCULAR; INTRAVENOUS; SUBCUTANEOUS at 21:01

## 2023-10-30 RX ADMIN — OXYCODONE HYDROCHLORIDE 2.5 MG: 5 TABLET ORAL at 18:38

## 2023-10-30 RX ADMIN — SODIUM CHLORIDE, POTASSIUM CHLORIDE, SODIUM LACTATE AND CALCIUM CHLORIDE 1000 ML: 600; 310; 30; 20 INJECTION, SOLUTION INTRAVENOUS at 14:44

## 2023-10-30 RX ADMIN — ONDANSETRON 4 MG: 2 INJECTION INTRAMUSCULAR; INTRAVENOUS at 22:15

## 2023-10-30 RX ADMIN — PIPERACILLIN AND TAZOBACTAM 4.5 G: 4; .5 INJECTION, POWDER, FOR SOLUTION INTRAVENOUS at 20:43

## 2023-10-30 RX ADMIN — HYDROMORPHONE HYDROCHLORIDE 0.5 MG: 1 INJECTION, SOLUTION INTRAMUSCULAR; INTRAVENOUS; SUBCUTANEOUS at 16:25

## 2023-10-30 RX ADMIN — ONDANSETRON 4 MG: 2 INJECTION INTRAMUSCULAR; INTRAVENOUS at 14:43

## 2023-10-30 RX ADMIN — PANTOPRAZOLE SODIUM 40 MG: 40 INJECTION, POWDER, FOR SOLUTION INTRAVENOUS at 20:42

## 2023-10-30 RX ADMIN — HYDROMORPHONE HYDROCHLORIDE 0.5 MG: 1 INJECTION, SOLUTION INTRAMUSCULAR; INTRAVENOUS; SUBCUTANEOUS at 15:07

## 2023-10-30 RX ADMIN — HYDROMORPHONE HYDROCHLORIDE 0.5 MG: 1 INJECTION, SOLUTION INTRAMUSCULAR; INTRAVENOUS; SUBCUTANEOUS at 23:50

## 2023-10-30 RX ADMIN — SODIUM CHLORIDE, POTASSIUM CHLORIDE, SODIUM LACTATE AND CALCIUM CHLORIDE 1000 ML: 600; 310; 30; 20 INJECTION, SOLUTION INTRAVENOUS at 16:25

## 2023-10-30 RX ADMIN — HYDROMORPHONE HYDROCHLORIDE 0.5 MG: 1 INJECTION, SOLUTION INTRAMUSCULAR; INTRAVENOUS; SUBCUTANEOUS at 23:53

## 2023-10-30 RX ADMIN — IOPAMIDOL 115 ML: 755 INJECTION, SOLUTION INTRAVENOUS at 17:59

## 2023-10-30 RX ADMIN — DOXYCYCLINE 100 MG: 100 INJECTION, POWDER, LYOPHILIZED, FOR SOLUTION INTRAVENOUS at 21:43

## 2023-10-30 RX ADMIN — ONDANSETRON 4 MG: 2 INJECTION INTRAMUSCULAR; INTRAVENOUS at 18:38

## 2023-10-30 RX ADMIN — OXYCODONE HYDROCHLORIDE 5 MG: 5 TABLET ORAL at 21:51

## 2023-10-30 RX ADMIN — VENLAFAXINE 150 MG: 75 TABLET ORAL at 20:43

## 2023-10-30 ASSESSMENT — ACTIVITIES OF DAILY LIVING (ADL)
ADLS_ACUITY_SCORE: 37

## 2023-10-30 ASSESSMENT — PAIN SCALES - GENERAL: PAINLEVEL: EXTREME PAIN (8)

## 2023-10-30 NOTE — ED PROVIDER NOTES
[place] [facility]ERGENCY DEPARTMENT ([facility])    10/30/23       ED PROVIDER NOTE        History     Chief Complaint   Patient presents with    Post-op Problem     HPI  Doretha Yu is a 48 year old female with a past medical history significant for GERD, ulcerative colitis s/p total abdominal colectomy and end ileostomy on 6/15/2021, C. Diff, asthma and anxiety who presents to the Emergency Department for evaluation of ongoing high output from her ileostomy 1600 mils a day.  Patient reviewing records patient been seen at [facility] a couple times in the ER evaluated for the similar symptoms CT scan a week ago reviewed revealed no significant findings noted.  Patient had C. difficile testing negative patient was sent home continued symptoms as had a couple ER visits.  Patient now presents after having conversation with GI today for further evaluation in the hospital.  Patient notes ongoing output with nausea some left-sided abdominal pain.  Some fevers now also reported.  Recommendations are for enteric panel along with C. difficile CT enterography IV hydration and other lab work etc.  No chest pain no headache no back pain no dysuric symptoms no rash no bleeding no hematemesis etc.      Past Medical History  Past Medical History:   Diagnosis Date    Abnormal MRI     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     Anxiety     Basal cell carcinoma     Cervical high risk HPV (human papillomavirus) test positive 12/13/2019    See problem list    Colitis     Depression     Diabetes mellitus, iatrogenic (H) 01/28/2020    Esophageal reflux     Inflammatory arthritis     Insomnia     Intestinal giardiasis 03/05/2018    Lumbago     left lower back pain    Lymphedema     Malignant melanoma (H)     Melanoma (H) 10/23/2017    Migraines     Mild persistent asthma     Morbid obesity with BMI of 40.0-44.9, adult (H)     STORMY (obstructive sleep apnea)     Prothrombin deficiency (H)     takes 81mg asa daily     Stroke (cerebrum) (H)     During     TIA (transient ischemic attack)     Type 2 diabetes mellitus (H)     Ulcerative pancolitis (H)      Past Surgical History:   Procedure Laterality Date    APPENDECTOMY      COLONOSCOPY N/A 10/18/2017    Procedure: COLONOSCOPY;  Colon;  Surgeon: Debbie Stephens MD;  [facility]    COLONOSCOPY N/A 2018    Procedure: COMBINED COLONOSCOPY, SINGLE OR MULTIPLE BIOPSY/POLYPECTOMY BY BIOPSY;  colon;  Surgeon: Benita Schumacher MD;  Location: [facility] GI    COLONOSCOPY      multiple since  to present - about 6 total    DAVINCI ASSISTED TRANSANAL TOTAL MESORECTAL EXCISION N/A 2023    Procedure: COMPLETION PROCTECTOMY, ROBOT-ASSISTED, ILEAL POUCH ANASTAMOSIS;  Surgeon: Quinton Ram MD;  [facility] OR    DISSECT LYMPH NODE AXILLA Left 10/23/2017    Procedure: DISSECT LYMPH NODE AXILLA;  Left Axillary Lymph Node Dissection ;  Surgeon: Laurent Cool MD;  [facility]    EXAM UNDER ANESTHESIA PELVIC N/A 2020    Procedure: EXAM UNDER ANESTHESIA, PELVIS; with Cervical Biopsies, Vaginal Biopsy and Endocervical Curettings;  Surgeon: Melina uJng MD;  Location: [facility] OR    GYN SURGERY  ,         LAPAROSCOPIC ASSISTED COLECTOMY N/A 06/15/2021    Procedure: laparoscopic total abdominal colectomy, end ileostomy;  Surgeon: Quinton Ram MD;  [facility]: [facility]    REPAIR MOHS Left 2017    Procedure: REPAIR MOHS;  Left Upper Lid Moh's Reconstruction;  Surgeon: Kisha Bosch MD;  [facility]    SIGMOIDOSCOPY FLEXIBLE N/A 2023    Procedure: Sigmoidoscopy flexible;  Surgeon: Quinton Ram MD;  Location: [facility]     acetaminophen (TYLENOL) 325 MG tablet  albuterol (PROAIR HFA/PROVENTIL HFA/VENTOLIN HFA) 108 (90 Base) MCG/ACT inhaler  diphenoxylate-atropine (LOMOTIL) 2.5-0.025 MG tablet  famotidine (PEPCID) 20 MG tablet  hydrOXYzine (ATARAX) 25 MG tablet  loperamide (IMODIUM A-D) 2 MG  tablet  loperamide (IMODIUM A-D) 2 MG tablet  medical cannabis (Patient's own supply)  menthol (ICY HOT) 5 % PTCH  menthol-zinc oxide (CALMOSEPTINE) 0.44-20.6 % OINT ointment  metFORMIN (GLUCOPHAGE) 500 MG tablet  methocarbamol (ROBAXIN) 750 MG tablet  ondansetron (ZOFRAN ODT) 4 MG ODT tab  ondansetron (ZOFRAN ODT) 4 MG ODT tab  Ostomy Supplies MISC  oxyCODONE (ROXICODONE) 5 MG tablet  phentermine (ADIPEX-P) 15 MG capsule  tolterodine ER (DETROL LA) 2 MG 24 hr capsule  venlafaxine (EFFEXOR) 50 MG tablet  venlafaxine (EFFEXOR) 75 MG tablet      Allergies   Allergen Reactions    Bee Venom Swelling    Azithromycin Diarrhea    Erythromycin      Other reaction(s): GI intolerance, Vomiting    Fentanyl Other (See Comments)     sweating  sweating    Prochlorperazine Fatigue     Other reaction(s): Other (see comments)  Fatigue    Buspirone      Other reaction(s): GI intolerance  vomiting    Erythrocin Nausea and Vomiting    Gluten Meal      Celiac disease    Topamax [Topiramate]      Made her lethargic    Zithromax [Azithromycin Dihydrate] Diarrhea    Enbrel [Etanercept] Hives and Rash     Family History  Family History   Problem Relation Age of Onset    Cancer Mother 45        lung    Neurologic Disorder Mother         epilepsy    Lipids Father     Gastrointestinal Disease Father         diverticulitis     Depression Father     Colitis Father     Colon Cancer Father     Diverticulitis Father     Depression Sister     Cancer Maternal Grandmother     Blood Disease Maternal Grandmother         lymphoma     Arthritis Maternal Grandmother     Diabetes Maternal Grandmother     Depression Maternal Grandmother     Macular Degeneration Maternal Grandmother     Glaucoma Maternal Grandmother     Diabetes Maternal Grandfather     Cerebrovascular Disease Maternal Grandfather     Blood Disease Maternal Grandfather     Heart Disease Maternal Grandfather     Glaucoma Maternal Grandfather     Cancer Paternal Grandmother     Cancer -  colorectal Paternal Grandmother     Colitis Paternal Grandmother     Colon Cancer Paternal Grandmother     Diverticulitis Paternal Grandmother     Respiratory Paternal Grandfather         emphysema     Colitis Paternal Grandfather     Colon Cancer Paternal Grandfather     Diverticulitis Paternal Grandfather     Heart Disease Daughter     Asthma Daughter     Melanoma No family hx of     Anesthesia Reaction No family hx of     Clotting Disorder No family hx of      Social History   Social History     Tobacco Use    Smoking status: Former     Packs/day: 1.00     Years: 5.00     Additional pack years: 0.00     Total pack years: 5.00     Types: Cigarettes     Quit date: 3/20/1998     Years since quittin.6    Smokeless tobacco: Never   Vaping Use    Vaping Use: Never used   Substance Use Topics    Alcohol use: Not Currently    Drug use: Yes     Types: Marijuana     Comment: vape, edible         A medically appropriate review of systems was performed with pertinent positives and negatives noted in the HPI, and all other systems negative.    Physical Exam   BP: 107/76  Pulse: 91  Temp: 100  F (37.8  C)  Resp: 18  SpO2: 90 %  Physical Exam  Vitals and nursing note reviewed.   Constitutional:       General: She is in acute distress.      Appearance: Normal appearance. She is well-developed. She is ill-appearing. She is not toxic-appearing.   HENT:      Head: Normocephalic and atraumatic.      Mouth/Throat:      Mouth: Mucous membranes are dry.      Pharynx: Oropharynx is clear.   Eyes:      General: No scleral icterus.     Extraocular Movements: Extraocular movements intact.      Conjunctiva/sclera: Conjunctivae normal.      Pupils: Pupils are equal, round, and reactive to light.   Cardiovascular:      Rate and Rhythm: Normal rate and regular rhythm.   Pulmonary:      Effort: Pulmonary effort is normal. No respiratory distress.      Breath sounds: No stridor.   Chest:      Chest wall: No tenderness.   Abdominal:       General: Abdomen is flat. There is no distension.      Palpations: Abdomen is soft. There is no mass.      Tenderness: There is abdominal tenderness.      Hernia: No hernia is present.      Comments: Ostomy without any bleeding noted no parastomal hernias noted.  No rigidity   Musculoskeletal:         General: No swelling or tenderness.      Cervical back: Normal range of motion and neck supple. No rigidity or tenderness.   Skin:     General: Skin is warm and dry.      Coloration: Skin is not jaundiced or pale.      Findings: No rash.   Neurological:      General: No focal deficit present.      Mental Status: She is alert and oriented to person, place, and time. Mental status is at baseline.   Psychiatric:      Comments: Flat affect noted           ED Course, Procedures, & Data      Records reviewed in epic.  Patient's visit with Dr. Mcfarland in the emergency department on the 26th of this month.  Patient also virtual visit by GI today with recommendations etc. noted previous CT scans a week ago reviewed also.    Here in the ER an IV was established patient received IV fluid lactated ringer bolus with continuous infusion ER.  Zofran for nausea.    White count 8.2 hemoglobin is 11.3.  Urinalysis shows 4 red cells 6 white cells amylase 19 sodium 131 potassium 3.5 bicarb 21 gap is 14.  Creatinine 0.83.  Glucose 132 patient's transaminases continue to be elevated compared to previous.  CRP is 133.    COVID influenza RSV were negative.  Stool studies ordered also.  Pending at this point.    Discussed with GI at this point recommendation CT enterography along with other labs etc. which ordered stool studies.  Patient be admitted to medicine I talked to them also will have GI consultation also in the morning patient given Dilaudid IV for pain control here in the ER.  Continue IV fluids.    Procedures            EKG Interpretation:      Interpreted by Ismael Spangler MD  Time reviewed: 2039  Symptoms at time of EKG: nausea    Rhythm: normal sinus   Rate: normal  Axis: normal  Ectopy: none  Conduction: normal  ST Segments/ T Waves: No ST-T wave changes  Q Waves: none  Comparison to prior: No old EKG available    Clinical Impression: normal EKG                 Results for orders placed or performed during the hospital encounter of 10/30/23   CT Enterography with Contrast     Status: None    Narrative    EXAMINATION: CT ENTEROGRAPHY WITH CONTRAST, 10/30/2023 6:06 PM    INDICATION: -- CT enterography for further assessment of bowel,  infection/abscess or other intraabdominal pathology    COMPARISON STUDY: CT chest abdomen and pelvis 10/23/2023    TECHNIQUE: CT scan of the abdomen and pelvis was performed on  multidetector CT scanner using volumetric acquisition technique and  images were reconstructed in multiple planes with variable thickness  and reviewed on dedicated workstations.     CONTRAST: 88 mm Isovue 360 injected IV with 1500 mL Breeza oral  contrast    CT scan radiation dose is optimized to minimum requisite dose using  automated dose modulation techniques.    FINDINGS:    Lower thorax: New left basilar consolidative opacity in the  posterior/lateral lower lobe.  Trace left pleural effusion with  adjacent atelectasis.    Liver: No mass. No intrahepatic biliary ductal dilation.  Geographic  relative hyperenhancement of the portion of segment 7, likely  perfusional.    Biliary System: Normal gallbladder. No extrahepatic biliary ductal  dilation.    Pancreas: Significant fatty atrophy of the pancreatic parenchyma,  greatest at the pancreatic body and tail.    Adrenal glands: No mass or nodules    Spleen: Normal.    Kidneys: No suspicious mass, obstructing calculus or hydronephrosis.    Gastrointestinal tract: Colectomy and completion proctectomy with  ileal J-pouch formation. Fluid-filled small bowel which is not  abnormally distended. No focal lesions. No inflammatory stranding.    Mesentery/peritoneum/retroperitoneum: No mass. No  free fluid or air.    Lymph nodes: No significant lymphadenopathy.    Vasculature: Patent major abdominal vasculature.    Pelvis: Urinary bladder is normal.  The uterus is within normal  limits. Bilateral thin-walled ovarian cysts measuring up to 3.3 cm on  the left and 3.2 cm on the righ. T    Osseous structures: No aggressive or acute osseous lesion.      Soft tissues: Soft tissue stranding in the anterior abdomen and right  of midline, likely postoperative.      Impression    IMPRESSION:   1. Surgical changes of total colectomy and completion proctectomy with  right lower quadrant diverting ileostomy. No evidence of infectious or  inflammatory bowel pathology. No intra-abdominal abscess.  2. New left lower lobe consolidative opacity, compatible with  pneumonia.  3. Trace left pleural effusion with adjacent atelectasis.    I have personally reviewed the examination and initial interpretation  and I agree with the findings.    KEESHA WALKER,          SYSTEM ID:  [id number]   CRP inflammation     Status: Abnormal   Result Value Ref Range    CRP Inflammation 133.00 (H) <5.00 mg/L   Partial thromboplastin time     Status: Normal   Result Value Ref Range    aPTT 29 22 - 38 Seconds   INR     Status: Normal   Result Value Ref Range    INR 1.07 0.85 - 1.15   Comprehensive metabolic panel     Status: Abnormal   Result Value Ref Range    Sodium 131 (L) 135 - 145 mmol/L    Potassium 3.5 3.4 - 5.3 mmol/L    Carbon Dioxide (CO2) 21 (L) 22 - 29 mmol/L    Anion Gap 14 7 - 15 mmol/L    Urea Nitrogen 3.4 (L) 6.0 - 20.0 mg/dL    Creatinine 0.83 0.51 - 0.95 mg/dL    GFR Estimate 87 >60 mL/min/1.73m2    Calcium 8.8 8.6 - 10.0 mg/dL    Chloride 96 (L) 98 - 107 mmol/L    Glucose 132 (H) 70 - 99 mg/dL    Alkaline Phosphatase 258 (H) 35 - 104 U/L    AST 92 (H) 0 - 45 U/L     (H) 0 - 50 U/L    Protein Total 7.1 6.4 - 8.3 g/dL    Albumin 3.8 3.5 - 5.2 g/dL    Bilirubin Total 0.2 <=1.2 mg/dL   Magnesium     Status: Abnormal   Result  Value Ref Range    Magnesium 1.6 (L) 1.7 - 2.3 mg/dL   Amylase     Status: Abnormal   Result Value Ref Range    Amylase 19 (L) 28 - 100 U/L   Lactic acid whole blood     Status: Normal   Result Value Ref Range    Lactic Acid 0.8 0.7 - 2.0 mmol/L   TSH     Status: Normal   Result Value Ref Range    TSH 2.55 0.30 - 4.20 uIU/mL   UA with Microscopic reflex to Culture     Status: Abnormal    Specimen: Urine, Clean Catch   Result Value Ref Range    Color Urine Straw Colorless, Straw, Light Yellow, Yellow    Appearance Urine Slightly Cloudy (A) Clear    Glucose Urine Negative Negative mg/dL    Bilirubin Urine Negative Negative    Ketones Urine Negative Negative mg/dL    Specific Gravity Urine 1.003 1.003 - 1.035    Blood Urine Trace (A) Negative    pH Urine 6.0 5.0 - 7.0    Protein Albumin Urine 20 (A) Negative mg/dL    Urobilinogen Urine Normal Normal, 2.0 mg/dL    Nitrite Urine Negative Negative    Leukocyte Esterase Urine Moderate (A) Negative    Bacteria Urine Few (A) None Seen /HPF    Mucus Urine Present (A) None Seen /LPF    RBC Urine 4 (H) <=2 /HPF    WBC Urine 6 (H) <=5 /HPF    Squamous Epithelials Urine <1 <=1 /HPF    Narrative    Urine Culture ordered based on laboratory criteria   Symptomatic Influenza A/B, RSV, & SARS-CoV2 PCR (COVID-19) Nasopharyngeal     Status: Normal    Specimen: Nasopharyngeal; Swab   Result Value Ref Range    Influenza A PCR Negative Negative    Influenza B PCR Negative Negative    RSV PCR Negative Negative    SARS CoV2 PCR Negative Negative    Narrative    Testing was performed using the Xpert Xpress CoV2/Flu/RSV Assay on the [employer] GeneXpert Instrument. This test should be ordered for the detection of SARS-CoV-2, influenza, and RSV viruses in individuals who meet clinical and/or epidemiological criteria. Test performance is unknown in asymptomatic patients. This test is for in vitro diagnostic use under the FDA EUA for laboratories certified under CLIA to perform high or moderate  complexity testing. This test has not been FDA cleared or approved. A negative result does not rule out the presence of PCR inhibitors in the specimen or target RNA in concentration below the limit of detection for the assay. If only one viral target is positive but coinfection with multiple targets is suspected, the sample should be re-tested with another FDA cleared, approved, or authorized test, if coinfection would change clinical management. This test was validated by the [facility] [place]. These laboratories are certified under the Clinical Laboratory Improvement Amendments of 1988 (CLIA-88) as qualified to perform high complexity laboratory testing.   CBC with platelets and differential     Status: Abnormal   Result Value Ref Range    WBC Count 8.2 4.0 - 11.0 10e3/uL    RBC Count 4.14 3.80 - 5.20 10e6/uL    Hemoglobin 11.3 (L) 11.7 - 15.7 g/dL    Hematocrit 34.2 (L) 35.0 - 47.0 %    MCV 83 78 - 100 fL    MCH 27.3 26.5 - 33.0 pg    MCHC 33.0 31.5 - 36.5 g/dL    RDW 16.6 (H) 10.0 - 15.0 %    Platelet Count 294 150 - 450 10e3/uL    % Neutrophils 69 %    % Lymphocytes 21 %    % Monocytes 7 %    % Eosinophils 0 %    % Basophils 1 %    % Immature Granulocytes 2 %    NRBCs per 100 WBC 0 <1 /100    Absolute Neutrophils 5.7 1.6 - 8.3 10e3/uL    Absolute Lymphocytes 1.7 0.8 - 5.3 10e3/uL    Absolute Monocytes 0.6 0.0 - 1.3 10e3/uL    Absolute Eosinophils 0.0 0.0 - 0.7 10e3/uL    Absolute Basophils 0.0 0.0 - 0.2 10e3/uL    Absolute Immature Granulocytes 0.1 <=0.4 10e3/uL    Absolute NRBCs 0.0 10e3/uL   Extra Tube ([place] Draw)     Status: None    Narrative    The following orders were created for panel order Extra Tube ([place] Draw).  Procedure                               Abnormality         Status                     ---------                               -----------         ------                     Extra Red Top Tube[[id number]]                               Final result                 Please view  results for these tests on the individual orders.   Extra Red Top Tube     Status: None   Result Value Ref Range    Hold Specimen JIC    Glucose by meter     Status: Abnormal   Result Value Ref Range    GLUCOSE BY METER POCT 122 (H) 70 - 99 mg/dL   EKG 12-lead, complete     Status: None (Preliminary result)   Result Value Ref Range    Systolic Blood Pressure  mmHg    Diastolic Blood Pressure  mmHg    Ventricular Rate 95 BPM    Atrial Rate 95 BPM    HI Interval 144 ms    QRS Duration 86 ms     ms    QTc 432 ms    P Axis 48 degrees    R AXIS 16 degrees    T Axis 43 degrees    Interpretation ECG       Sinus rhythm  Cannot rule out Anterior infarct , age undetermined  Abnormal ECG     CBC with platelets differential     Status: Abnormal    Narrative    The following orders were created for panel order CBC with platelets differential.  Procedure                               Abnormality         Status                     ---------                               -----------         ------                     CBC with platelets and d...[[id number]]  Abnormal            Final result                 Please view results for these tests on the individual orders.     Medications   lidocaine 1 % 0.1-1 mL (has no administration in time range)   lidocaine (LMX4) cream (has no administration in time range)   sodium chloride (PF) 0.9% PF flush 3 mL (3 mLs Intracatheter Not Given 10/30/23 1527)   sodium chloride (PF) 0.9% PF flush 3 mL (has no administration in time range)   lactated ringers infusion 1,000 mL (1,000 mLs Intravenous $New Bag 10/30/23 1625)   ondansetron (ZOFRAN) injection 4 mg (4 mg Intravenous $Given 10/30/23 1838)   albuterol (PROVENTIL HFA/VENTOLIN HFA) inhaler (has no administration in time range)   hydrOXYzine (ATARAX) tablet 25 mg ( Oral Automatically Held 11/2/23 2000)   loperamide (IMODIUM A-D) tablet 2 mg (has no administration in time range)   methocarbamol (ROBAXIN) tablet 750 mg ( Oral Held by provider  10/30/23 1827)   tolterodine ER (DETROL LA) 24 hr capsule 2-4 mg ( Oral Automatically Held 11/2/23 0800)   venlafaxine (EFFEXOR) tablet 150 mg (150 mg Oral $Given 10/30/23 2043)   melatonin tablet 3 mg (has no administration in time range)   acetaminophen (TYLENOL) tablet 650 mg (has no administration in time range)     Or   acetaminophen (TYLENOL) Suppository 650 mg (has no administration in time range)   senna-docusate (SENOKOT-S/PERICOLACE) 8.6-50 MG per tablet 1 tablet (has no administration in time range)     Or   senna-docusate (SENOKOT-S/PERICOLACE) 8.6-50 MG per tablet 2 tablet (has no administration in time range)   polyethylene glycol (MIRALAX) Packet 17 g (has no administration in time range)   piperacillin-tazobactam (ZOSYN) 4.5 g vial to attach to  mL bag (4.5 g Intravenous $New Bag 10/30/23 2043)   pantoprazole (PROTONIX) IV push injection 40 mg (40 mg Intravenous $Given 10/30/23 2042)   doxycycline (VIBRAMYCIN) 100 mg vial to attach to  mL bag (100 mg Intravenous $New Bag 10/30/23 2143)   oxyCODONE (ROXICODONE) tablet 5 mg (5 mg Oral $Given 10/30/23 2151)   HYDROmorphone (DILAUDID) injection 0.2-0.5 mg (0.5 mg Intravenous $Given 10/30/23 2101)   glucose gel 15-30 g (has no administration in time range)     Or   dextrose 50 % injection 25-50 mL (has no administration in time range)     Or   glucagon injection 1 mg (has no administration in time range)   insulin aspart (NovoLOG) injection (RAPID ACTING) (has no administration in time range)   insulin aspart (NovoLOG) injection (RAPID ACTING) ( Subcutaneous Not Given 10/30/23 2148)   lactated ringers BOLUS 1,000 mL (0 mLs Intravenous Stopped 10/30/23 1620)   HYDROmorphone (PF) (DILAUDID) injection 0.5 mg (0.5 mg Intravenous $Given 10/30/23 1507)   BREEZA Lemon-Lime flavored oral liquid for Neutral Abdominal/Pelvic Imaging 1,500 mL (1,500 mLs Oral $Given 10/30/23 3054)   iopamidol (ISOVUE-370) solution 115 mL (115 mLs Intravenous $Given  10/30/23 1759)   sodium chloride (PF) 0.9% PF flush 88 mL (88 mLs Intravenous $Given 10/30/23 1759)     Labs Ordered and Resulted from Time of ED Arrival to Time of ED Departure   CRP INFLAMMATION - Abnormal       Result Value    CRP Inflammation 133.00 (*)    COMPREHENSIVE METABOLIC PANEL - Abnormal    Sodium 131 (*)     Potassium 3.5      Carbon Dioxide (CO2) 21 (*)     Anion Gap 14      Urea Nitrogen 3.4 (*)     Creatinine 0.83      GFR Estimate 87      Calcium 8.8      Chloride 96 (*)     Glucose 132 (*)     Alkaline Phosphatase 258 (*)     AST 92 (*)      (*)     Protein Total 7.1      Albumin 3.8      Bilirubin Total 0.2     MAGNESIUM - Abnormal    Magnesium 1.6 (*)    AMYLASE - Abnormal    Amylase 19 (*)    ROUTINE UA WITH MICROSCOPIC REFLEX TO CULTURE - Abnormal    Color Urine Straw      Appearance Urine Slightly Cloudy (*)     Glucose Urine Negative      Bilirubin Urine Negative      Ketones Urine Negative      Specific Gravity Urine 1.003      Blood Urine Trace (*)     pH Urine 6.0      Protein Albumin Urine 20 (*)     Urobilinogen Urine Normal      Nitrite Urine Negative      Leukocyte Esterase Urine Moderate (*)     Bacteria Urine Few (*)     Mucus Urine Present (*)     RBC Urine 4 (*)     WBC Urine 6 (*)     Squamous Epithelials Urine <1     CBC WITH PLATELETS AND DIFFERENTIAL - Abnormal    WBC Count 8.2      RBC Count 4.14      Hemoglobin 11.3 (*)     Hematocrit 34.2 (*)     MCV 83      MCH 27.3      MCHC 33.0      RDW 16.6 (*)     Platelet Count 294      % Neutrophils 69      % Lymphocytes 21      % Monocytes 7      % Eosinophils 0      % Basophils 1      % Immature Granulocytes 2      NRBCs per 100 WBC 0      Absolute Neutrophils 5.7      Absolute Lymphocytes 1.7      Absolute Monocytes 0.6      Absolute Eosinophils 0.0      Absolute Basophils 0.0      Absolute Immature Granulocytes 0.1      Absolute NRBCs 0.0     GLUCOSE BY METER - Abnormal    GLUCOSE BY METER POCT 122 (*)    PARTIAL  THROMBOPLASTIN TIME - Normal    aPTT 29     INR - Normal    INR 1.07     LACTIC ACID WHOLE BLOOD - Normal    Lactic Acid 0.8     TSH - Normal    TSH 2.55     INFLUENZA A/B, RSV, & SARS-COV2 PCR - Normal    Influenza A PCR Negative      Influenza B PCR Negative      RSV PCR Negative      SARS CoV2 PCR Negative     CALPROTECTIN FECES   INFLUENZA A/B, RSV, & SARS-COV2 PCR   OSMOLALITY, RANDOM URINE   OSMOLALITY   ACUTE HEPATITIS PANEL   GLUCOSE MONITOR NURSING POCT   GLUCOSE MONITOR NURSING POCT   BLOOD CULTURE   BLOOD CULTURE   ENTERIC BACTERIA AND VIRUS PANEL BY PCR   C. DIFFICILE TOXIN B PCR WITH REFLEX TO C. DIFFICILE ANTIGEN AND TOXINS A/B EIA   URINE CULTURE   MRSA MSSA PCR, NASAL SWAB   LE[facility]ION[facility]A PNEUMOPHILA URINARY ANTIGEN   STREPTOCOCCUS PNEUMONIAE ANTIGEN   FRACTIONAL EXCRETION OF SODIUM     CT Enterography with Contrast   Final Result   IMPRESSION:    1. Surgical changes of total colectomy and completion proctectomy with   right lower quadrant diverting ileostomy. No evidence of infectious or   inflammatory bowel pathology. No intra-abdominal abscess.   2. New left lower lobe consolidative opacity, compatible with   pneumonia.   3. Trace left pleural effusion with adjacent atelectasis.      I have personally reviewed the examination and initial interpretation   and I agree with the findings.      KEESHA WALKER,             SYSTEM ID:  [id number]             Critical care was not performed.     Medical Decision Making  The patient's presentation was of high complexity (an acute health issue posing potential threat to life or bodily function).    The patient's evaluation involved:  review of external note(s) from 3+ sources (see separate area of note for details)  review of 3+ test result(s) ordered prior to this encounter (see separate area of note for details)  ordering and/or review of 3+ test(s) in this encounter (see separate area of note for details)  discussion of management or test interpretation  with another health professional (see separate area of note for details)    The patient's management necessitated high risk (a parenteral controlled substance) and high risk (a decision regarding hospitalization).    Assessment & Plan   48 female history of ulcerative colitis history of previous surgeries now has high output from her ileostomy.  Patient seen in the ER at [facility] CT scan done a week ago did not show any acute findings given IV fluids etc. initial C. difficile was negative.  Patient ongoing symptoms had a GI visit today recommend going to the ER patient continued nausea vomiting left abdominal pain noted.  No true coughing chest pain shortness of breath some fevers noted also.  No blood in the stool here in the ER evaluated abdomen nonsurgical.  Low-grade temperature noted patient's white count normal CRP is elevated patient had received IV fluids in the ER Zofran and Dilaudid IV stool studies ordered for enteric and C. difficile discussed with GI ordered CT enterography pending results talk to medicine will admit to medicine with GI consultation regarding ongoing high output from patient's end ileostomy with ulcerative colitis with low-grade fever dehydration.       I have reviewed the nursing notes. I have reviewed the findings, diagnosis, plan and need for follow up with the patient.    New Prescriptions    No medications on file       Final diagnoses:   Ulcerative colitis with complication, unspecified location (H)   S/P colectomy   Dehydration   High output ileostomy (H)       Ismael Spangler  [facility] EMERGENCY DEPARTMENT  10/30/2023    This note was created at least in part by the use of dragon voice dictation system. Inadvertent typographical errors may still exist.  Ismael Spangler MD.  Patient evaluated in the emergency department during the COVID-19 pandemic period. Careful attention to patients safety was addressed throughout the evaluation. Evaluation and treatment management  was initiated with disposition made efficiently and appropriate as possible to minimize any risk of potential exposure to patient during this evaluation.       Ismael Spangler MD  10/30/23 2049

## 2023-10-30 NOTE — NURSING NOTE
Is the patient currently in the state of [place]? YES    Visit mode:VIDEO    If the visit is dropped, the patient can be reconnected by: VIDEO VISIT: Text to cell phone:   Telephone Information:   Mobile [phone]       Will anyone else be joining the visit? NO  (If patient encounters technical issues they should call [phone]    How would you like to obtain your AVS? MyChart    Are changes needed to the allergy or medication list? No    Reason for visit: No chief complaint on file.    Susan MANE

## 2023-10-30 NOTE — LETTER
10/30/2023         RE: Doretha Yu  [address]  [facility] [address]        Dear Colleague,    Thank you for referring your patient, Doretha Yu, to the [facility] [place]. Please see a copy of my visit note below.    Virtual Visit Details    Type of service:  Video Visit   Video Start Time:  11:03 AM  Video End Time: 11:46 AM    Originating Location (pt. Location): Home    Distant Location (provider location):  Off-site  Platform used for Video Visit: [facility]            IBD CLINIC VISIT     CC/REFERRING MD:  No ref. provider found  REASON FOR CONSULTATION: high ilestomy output, elevated LFTs    ASSESSMENT/PLAN    48 year old female with history of UC now s/p total abdominal colectomy and end ileostomy on 6/15/2021 with Dr. Ram and robotic completion proctectomy with creation of J-pouch with IPAA, takedown of end ileostomy, and diverting loop ileostomy 9/5/23. Following up in clinic due to high ileostomy output and elevated LFTs.    Given new fever up to 101.8 today despite high dose tylenol, feeling very dehydrated, weak and unable to get out of bed despite home IVF recommended she go to emergency room for further evaluation. Unable to complete physical exam or vitals today due to virtual visit. She had family (father and step mother) at home helping her today that are able to bring her.      1. 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 Surgical path consistent with moderate disease. She continues to follow closely with colorectal surgery at this time.  Since second surgery has had increased outputs, worse over past 2 to 3 weeks with estimated 1600 cc out daily. She has constant nausea and new onset significant left sided abdominal pain today. She just started home IV  fluids 3 days a week, despite this feels dehydrated and has new onset of fever despite Tylenol (101.8 today). I was not able to complete physical exam or vitals today due to virtual nature of visit.  Given worsening symptoms and fever would recommend emergency department evaluation for potential infection or other process.  She likely also needs fluids.      As an outpatient, pending infectious stool studies reviewed recommendations outlined below for decreasing ostomy output including maximizing loperamide, restarting fiber per colorectal's prior recommendation, and potential to restart Lomotil pending output with these changes. Could consider tincture of opium in the future.  Recommend continuing home IV fluids, pending hydration status may need more frequent. Her beverages may also be leading to more output with Gatorade being hypertonic and possible containing sugar alcohols. She is also drinking plain water that may be pulling Na and fluid into lumen. Would recommend ORS such as pedialyte and referral to dietitian for further evaluation and recommendations of hydration and dietary choices that would help slow ostomy.    Recommend patient go to ED for evaluation and treatment.  Consider following for GI work-up with additional labs and studies as appropriate:  -- Repeat labs for hydration and infection  -- Enteric panel  -- Repeat C.Diff   -- CT enterography for further assessment of bowel, infection/abscess or other intraabdominal pathology    To assist with ostomy output, recommend:  -- Increase Loperimide to 2 tabs 4 times per day  -- Restart fiber 2 tsp twice per day (can increase to 3 times per day)  --If loperimide (8 pills per day) and fiber still not keeping output <9930-3118 ml per day, would recommend restarting lomotil at 2 tabs, can further titrate  -- Avoid high sugar foods and drinks and anything with sugar alcohols, for hydration try Pedialyte instead.   -- Nutrition referral for ostomy diet and  hydration        2. Elevated LFTs  Elevation of Alk Phos, ALT, and AST since 7/11/2023. Slight improvement with last draw. Would recommend full lab work up with hepatitis serologies, ISAEL, antimitochondrial antibody, alpha-1 antitrypsin, celiac serologies, SPEP, F-actin. When more stable, would recommend MRCP for further evaluation, particularly with risk of PSC with history of UC. She does have history of fatty liver and biliary dyskinesia. Note she does have significant tylenol use recently, also possibly contributing.     -- Limit Tylenol use  -- Repeat LFTs  -- Hepatitis serologies, ISAEL, antimitochondrial antibody, alpha-1, celiac serologies, SPEP, F-actin,   -- MRCP when stable    3.  Seronegative RA: Patient has followed with rheumatology.       4.  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  It was a pleasure to participate in the care of this patient; please contact us with any further questions.  I spent a total of 90 minutes, face to face, was spent with this patient, >50% of which was counseling regarding the above delineated issues.    This note was created with voice recognition software, and while reviewed for accuracy, typos may remain.     Beti Issa PA-C  Inflammatory Bowel Disease Program   Division of Gastroenterology, Hepatology and Nutrition  [facility]        IBD HISTORY  Age at diagnosis: 2021  Extent of disease: proctosigmoid   Current UC medications: None  Prior UC surgeries:  total abdominal colectomy and end ileostomy 6/15/21   Prior IBD Medications:   Infliximab, 1 dose in the hospital good response  Vedolizumab, completed induction dosing  Prednisone, minimal response to high-dose oral and IV with significant weight gain  5-ASA, no response  Topical therapies to include enemas, no response    DRUG MONITORING  TPMT enzyme activity:     6-TGN/6-MMPN levels:    Biologic  concentration:       DISEASE ASSESSMENT  Endoscopic assessment:   EGD 2/14/23 ([facility])  Post-op Diagnoses:        - LA Grade A reflux esophagitis.        - Erythematous mucosa in the prepyloric region of the stomach. Biopsied.        - A few fundic gland polyps.        - A single non-bleeding angioectasia in the duodenum (posterior wall).        - Biopsies were taken with a cold forceps for evaluation of celiac        disease.        - No clear cause of abdominal pain identified. No evidence of recurrent        melanoma.     Surgical Path:  Colon path 6/15/23:  FINAL DIAGNOSIS:   COLON, RESECTION:   - Moderate chronic active colitis with crypt abscess formation ([place]   grade 3)   - Negative for dysplasia and malignancy   - Viable surgical margins   - Two benign lymph nodes (0/2)     Surgical Pathology (9/5/2023):  A. ILEUM, ILEOSTOMY TRIM;  Ileo-cutaneous anastomotic junction with nonspecific mucosa inflammation, congestion, and other features consistent with ileostomy; no dysplasia or malignancy  B. RECTUM, COMPLETION PROCTECTOMY:  Mildly active chronic proctitis with:   -Neutrophilic cryptitis, mucosal atrophy and prominent reactive lymphoid hyperplasias  -Reactive mesorectal lymph nodes;  no dysplasia or malignancy  C. COLON, ANASTOMOTIC RINGS; EXCISION:   -Portions of small intestinal mucosa and wall with no morphologic abnormalities   -Portion of rectal mucosa and wall with chronic proctitis; no dysplasia or malignancy     Enterography:   -- MRE 12/27/2022 no findings for acute inflammation of the bowel    Fecal calprotectin: --    C diff: negative 10/26/23, negative 1/12/23,  was positive 3/2021 treated with vanco taper (at [facility])     HPI:   Doretha Yu is a 48 year old female with significant PMH of melanoma unknown primary (2017), s/p left axillary LN dissection (2017, Dr. Cool) in remission, history of adjuvant nivolumab induced colitis in 2018 treated with remicade (x1 at [facility]) and Entyvio, h/o c.diff  "(recent bout, started PO vanc 4/7), seronegative RA on prednisone & tocilizumab, steroid induced DM, history of cushings, chronic pain, with diagnosis of UC in 2021, received Entyvio x2 mostly steroid dependent x3 years.  She had multiple readmissions for hematochezia, acute on chronic abdominal & rectal pain, nausea for refractory UC and concerns regarding risks associated with melanoma recurrence with using standard UC maintenance medications.  She underwent total abdominal colectomy and end ileostomy on 6/15/2021 with Dr. Ram. She is now s/p robotic completion proctectomy with creation of J-pouch with IPAA, takedown of end ileostomy, and diverting loop ileostomy 9/5/23. Plan for ileostomy closure 12/27/23. She was last seen in IBD clinic by Zeferino Sanchez PA-C in 2021.    She last saw Dr. Ram 10/10/23 and has been in close communication with their clinic regarding recent or high ileostomy output. She reports that ostomy output had been higher since surgery but really increased in past 2-3 weeks. She has been to ED twice in past week and received fluids and antiemetics. Labs with WBC mildly elevated to 12. Cr elevated to ~1. Her LFTs (alk phos, AST, ALT) have also been high with some mild improvement per most recent labs 10/27/23. C.diff was negative. CT Chest abdomen pelvis done 10/24/23 showed postsurgical changes, no abscess. She started IVF with [facility] last week with schedule of M, W, F. She notes that she has small gauge needle and takes ~10 hrs to get in 1 L.     Despite IVF she reports today that she feels extremely dehydrated. Reports she feels very weak and has not been able to get out of bed past 2 days. She also started having a fever on Saturday, now up to 101.8 degrees day. She infused 1 L yesterday. Currently has 1600 ml output per day. Brown/green watery output, pills in output, unclear if outer covering. She is drinking Gatorade \"hydrate\" plain water and liquid IV . She " "has not been able to eat significant amounts other than some jello yesterday. She is currently taking 2 tabs loperamide about 3 times per day (as able when she is awake), not taking fiber, not taking lomotil. She reports constant nausea, no vomiting, takes zofran every 4-5 hours (4 pills per day). Has medical canabis but hasn't used last couple days. She has also been experiencing abdominal pain, was left sided shooting pains now today sharp left sided pain under ribs, bringing her to tears. Has been taking 1000 mg tylenol every 6 hours over past week for abdominal pain.    Stoma is red. Ostomy bag popping off and leaking due to gas.    In terms of her pouch she is no longer having blood. Now brown or green mucous, empties when she urinates. Denies any abnormal vaginal output (did get menses).    She has not started Flagyl or other antibiotics.    Today during visit she is tearful and repeats  \"Something not right\"         Extra intestinal manifestations of IBD:  No uveitis/episcleritis  No aphthous ulcers   No arthritis   No erythema nodosum/pyoderma gangrenosum.     sIBDQ:  IBDQ Score Date IBDQ - Total Score  (Higher score better)   4/10/2018   7:20 AM 32          ROS:    Constitutional, HEENT, cardiovascular, pulmonary, GI, , musculoskeletal, neuro, skin, endocrine and psych systems are negative, except as otherwise noted.    PHYSICAL EXAMINATION:  Constitutional: aaox3, cooperative, pleasant, not dyspneic/diaphoretic, no acute distress  Vitals reviewed: There were no vitals taken for this visit.  Wt:   Wt Readings from Last 2 Encounters:   10/26/23 84.8 kg (187 lb)   10/23/23 85.3 kg (188 lb)      General appearance: Healthy appearing adult, in no acute distress  Eyes: Sclera anicteric, Pupils round and reactive to light  Ears, nose, mouth and throat: No obvious external lesions of ears and nose, Hearing intact  Neck: Symmetric, No obvious external lesions  Respiratory: Normal respiration, no use of accessory " muscles   MSK: Gait normal  Skin: No rashes or jaundice   Psychiatric: Oriented to person, place and time, Appropriate mood and affect.       PERTINENT PAST MEDICAL HISTORY:  Past Medical History:   Diagnosis Date    Abnormal MRI     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     Anxiety     Basal cell carcinoma     Cervical high risk HPV (human papillomavirus) test positive 2019    See problem list    Colitis     Depression     Diabetes mellitus, iatrogenic (H) 2020    Esophageal reflux     Inflammatory arthritis     Insomnia     Intestinal giardiasis 2018    Lumbago     left lower back pain    Lymphedema     Malignant melanoma (H)     Melanoma (H) 10/23/2017    Migraines     Mild persistent asthma     Morbid obesity with BMI of 40.0-44.9, adult (H)     STORMY (obstructive sleep apnea)     Prothrombin deficiency (H)     takes 81mg asa daily    Stroke (cerebrum) (H)     During     TIA (transient ischemic attack)     Type 2 diabetes mellitus (H)     Ulcerative pancolitis (H)        PREVIOUS SURGERIES:  Past Surgical History:   Procedure Laterality Date    APPENDECTOMY      COLONOSCOPY N/A 10/18/2017    Procedure: COLONOSCOPY;  Colon;  Surgeon: Debbie Stephens MD;  [facility]    COLONOSCOPY N/A 2018    Procedure: COMBINED COLONOSCOPY, SINGLE OR MULTIPLE BIOPSY/POLYPECTOMY BY BIOPSY;  colon;  Surgeon: Benita Schumacher MD;  Location: [facility]    COLONOSCOPY      multiple since  to present - about 6 total    DAVINCI ASSISTED TRANSANAL TOTAL MESORECTAL EXCISION N/A 2023    Procedure: COMPLETION PROCTECTOMY, ROBOT-ASSISTED, ILEAL POUCH ANASTAMOSIS;  Surgeon: Quinton Ram MD;  Location: [facility]    DISSECT LYMPH NODE AXILLA Left 10/23/2017    Procedure: DISSECT LYMPH NODE AXILLA;  Left Axillary Lymph Node Dissection ;  Surgeon: Laurent Cool MD;  Location: [facility]    EXAM UNDER ANESTHESIA PELVIC N/A 2020    Procedure: EXAM UNDER ANESTHESIA,  PELVIS; with Cervical Biopsies, Vaginal Biopsy and Endocervical Curettings;  Surgeon: Melina Jung MD;  Location: [facility] OR    GYN SURGERY  ,         LAPAROSCOPIC ASSISTED COLECTOMY N/A 06/15/2021    Procedure: laparoscopic total abdominal colectomy, end ileostomy;  Surgeon: Quinton Ram MD;  Location: [facility]    REPAIR MOHS Left 2017    Procedure: REPAIR MOHS;  Left Upper Lid Moh's Reconstruction;  Surgeon: Kisha Bosch MD;  [facility]    SIGMOIDOSCOPY FLEXIBLE N/A 2023    Procedure: Sigmoidoscopy flexible;  Surgeon: Quinton Ram MD;  Location: [facility]       ALLERGIES:     Allergies   Allergen Reactions    Bee Venom Swelling    Azithromycin Diarrhea    Erythromycin      Other reaction(s): GI intolerance, Vomiting    Fentanyl Other (See Comments)     sweating  sweating    Prochlorperazine Fatigue     Other reaction(s): Other (see comments)  Fatigue    Buspirone      Other reaction(s): GI intolerance  vomiting    Erythrocin Nausea and Vomiting    Gluten Meal      Celiac disease    Topamax [Topiramate]      Made her lethargic    Zithromax [Azithromycin Dihydrate] Diarrhea    Enbrel [Etanercept] Hives and Rash       PERTINENT MEDICATIONS:    Current Outpatient Medications:     acetaminophen (TYLENOL) 325 MG tablet, Take 3 tablets (975 mg) by mouth every 6 hours, Disp: 100 tablet, Rfl: 0    albuterol (PROAIR HFA/PROVENTIL HFA/VENTOLIN HFA) 108 (90 Base) MCG/ACT inhaler, Inhale 2 puffs into the lungs every 4 hours as needed for shortness of breath / dyspnea, Disp: 18 g, Rfl: 1    diphenoxylate-atropine (LOMOTIL) 2.5-0.025 MG tablet, Take 1 tablet by mouth 2 times daily, Disp: 60 tablet, Rfl: 2    famotidine (PEPCID) 20 MG tablet, Take 1 tablet (20 mg) by mouth 2 times daily, Disp: 60 tablet, Rfl: 0    hydrOXYzine (ATARAX) 25 MG tablet, Take 1 tablet (25 mg) by mouth 2 times daily, Disp: 60 tablet, Rfl: 1    loperamide (IMODIUM A-D) 2 MG tablet, Take 2 tabs  daily and increase as needed until output is apple sauce consistency. Max of 8 tabs (16 mg) per day., Disp: 240 tablet, Rfl: 1    loperamide (IMODIUM A-D) 2 MG tablet, Take 1 tablet (2 mg) by mouth 4 times daily as needed for other (high ileostomy output), Disp: 120 tablet, Rfl: 1    medical cannabis (Patient's own supply), See Admin Instructions (The purpose of this order is to document that the patient reports taking medical cannabis.  This is not a prescription, and is not used to certify that the patient has a qualifying medical condition.), Disp: , Rfl:     menthol (ICY HOT) 5 % PTCH, Apply 1 patch topically every 8 hours as needed for muscle soreness, Disp: 10 patch, Rfl: 0    menthol-zinc oxide (CALMOSEPTINE) 0.44-20.6 % OINT ointment, Apply topically 4 times daily as needed for skin protection, Disp: 71 g, Rfl: 0    metFORMIN (GLUCOPHAGE) 500 MG tablet, TAKE 2 TABLETS BY MOUTH 2 TIMES DAILY WITH MEALS (Patient taking differently: Take 500 mg by mouth 2 times daily (with meals) TAKE 2 TABLETS BY MOUTH 2 TIMES DAILY WITH MEALS), Disp: 360 tablet, Rfl: 1    methocarbamol (ROBAXIN) 750 MG tablet, Take 1 tablet (750 mg) by mouth 4 times daily as needed for muscle spasms, Disp: 40 tablet, Rfl: 0    ondansetron (ZOFRAN ODT) 4 MG ODT tab, Take 1 tablet (4 mg) by mouth every 8 hours as needed for nausea, Disp: 20 tablet, Rfl: 0    ondansetron (ZOFRAN ODT) 4 MG ODT tab, Take 1 tablet (4 mg) by mouth every 8 hours as needed for nausea or vomiting (Patient taking differently: Take 4 mg by mouth as needed for nausea or vomiting), Disp: 10 tablet, Rfl: 1    Ostomy Supplies MISC, 20 each daily, Disp: 20 each, Rfl: 11    oxyCODONE (ROXICODONE) 5 MG tablet, Take 1-2 tablets (5-10 mg) by mouth every 6 hours as needed for severe pain, Disp: 40 tablet, Rfl: 0    phentermine (ADIPEX-P) 15 MG capsule, Take 15 mg by mouth every morning, Disp: , Rfl:     tolterodine ER (DETROL LA) 2 MG 24 hr capsule, Take 1-2 capsules (2-4 mg) by  mouth daily, Disp: 10 capsule, Rfl: 0    venlafaxine (EFFEXOR) 50 MG tablet, Take 1 tablet (50 mg) by mouth 2 times daily Total of 150 mg twice daily, Disp: 180 tablet, Rfl: 1    venlafaxine (EFFEXOR) 75 MG tablet, TAKE TWO TABLETS BY MOUTH TWICE A DAY, Disp: 360 tablet, Rfl: 0  No current facility-administered medications for this visit.    Facility-Administered Medications Ordered in Other Visits:     lidocaine 1 % 9 mL, 9 mL, Intradermal, Once, Anna Naidu MD    sodium bicarbonate 8.4 % injection 1 mEq, 1 mEq, Intradermal, Once, Anna Naidu MD    SOCIAL HISTORY:  Social History     Socioeconomic History    Marital status:      Spouse name: Not on file    Number of children: Not on file    Years of education: Not on file    Highest education level: Not on file   Occupational History    Not on file   Tobacco Use    Smoking status: Former     Packs/day: 1.00     Years: 5.00     Additional pack years: 0.00     Total pack years: 5.00     Types: Cigarettes     Quit date: 3/20/1998     Years since quittin.6    Smokeless tobacco: Never   Vaping Use    Vaping Use: Never used   Substance and Sexual Activity    Alcohol use: Not Currently    Drug use: Yes     Types: Marijuana     Comment: vape, edible    Sexual activity: Not Currently     Partners: Male     Birth control/protection: Surgical   Other Topics Concern    Parent/sibling w/ CABG, MI or angioplasty before 65F 55M? No   Social History Narrative    20 y.o- patient's mother   of lung cancer. She had to take care of her younger sister.    2012- patient's  had a heart attack with stents placed, followed by cardiac rehabilitation    2000 TO 2012  was in [facility] for depression    2013 patient's  went through alcohol rehabilitation at [facility]            They have attended couple counseling a couple of times and patient went to the family program for  "chemical dependency.    Patient denies alcohol or drug use and herself            Has 2 children, girls ages 17 and 14. Oldest has been a \"trouble maker.\"     For a while she was working 3 jobs since her  was ill. 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 Reports her job is very stressful.         Social Determinants of Health     Financial Resource Strain: High Risk (6/28/2021)    Overall Financial Resource Strain (CARDIA)     Difficulty of Paying Living Expenses: Very hard   Food Insecurity: No Food Insecurity (6/28/2021)    Hunger Vital Sign     Worried About Running Out of Food in the Last Year: Never true     Ran Out of Food in the Last Year: Never true   Transportation Needs: No Transportation Needs (6/28/2021)    PRAPARE - Transportation     Lack of Transportation (Medical): No     Lack of Transportation (Non-Medical): No   Physical Activity: Not on file   Stress: Stress Concern Present (6/25/2021)    [place] [employer] - Occupational Stress Questionnaire     Feeling of Stress : Very much   Social Connections: Unknown (6/25/2021)    Social Connection and Isolation Panel [NHANES]     Frequency of Communication with Friends and Family: Not asked     Frequency of Social Gatherings with Friends and Family: Not asked     Attends Nondenominational Services: Not asked     Active Member of Clubs or Organizations: Not asked     Attends Club or Organization Meetings: Not asked     Marital Status:    Interpersonal Safety: Unknown (6/25/2021)    Humiliation, Afraid, Rape, and Kick questionnaire     Fear of Current or Ex-Partner: Not asked     Emotionally Abused: Not asked     Physically Abused: Not asked     Sexually Abused: Not asked   Housing Stability: Not on file       FAMILY HISTORY:  Father with history of colon polyps.  Paternal grandmother with history of colon cancer in her 50s or 60s.  Father did also history of diverticulitis as well as IBS.  Her child has been diagnosed " with IBS.  No history of Crohn's disease.  No history of ulcerative colitis.   Family History   Problem Relation Age of Onset    Cancer Mother 45        lung    Neurologic Disorder Mother         epilepsy    Lipids Father     Gastrointestinal Disease Father         diverticulitis     Depression Father     Colitis Father     Colon Cancer Father     Diverticulitis Father     Depression Sister     Cancer Maternal Grandmother     Blood Disease Maternal Grandmother         lymphoma     Arthritis Maternal Grandmother     Diabetes Maternal Grandmother     Depression Maternal Grandmother     Macular Degeneration Maternal Grandmother     Glaucoma Maternal Grandmother     Diabetes Maternal Grandfather     Cerebrovascular Disease Maternal Grandfather     Blood Disease Maternal Grandfather     Heart Disease Maternal Grandfather     Glaucoma Maternal Grandfather     Cancer Paternal Grandmother     Cancer - colorectal Paternal Grandmother     Colitis Paternal Grandmother     Colon Cancer Paternal Grandmother     Diverticulitis Paternal Grandmother     Respiratory Paternal Grandfather         emphysema     Colitis Paternal Grandfather     Colon Cancer Paternal Grandfather     Diverticulitis Paternal Grandfather     Heart Disease Daughter     Asthma Daughter     Melanoma No family hx of     Anesthesia Reaction No family hx of     Clotting Disorder No family hx of        Past/family/social history reviewed and no changes      Again, thank you for allowing me to participate in the care of your patient.      Sincerely,    Beti Issa PA-C

## 2023-10-30 NOTE — LETTER
[facility] MED SURG  [address]  [place] [address]  Phone: [phone]    November 5, 2023        Doretha OG Yu  [address] [address] [address]          To whom it may concern:    RE: Doretha Yu    The patient name mentioned above has been hospitalized 10/30/2023 and expecting to be discharged around 11/7/2023. Can return to work 11/13/2023.    Please contact me for questions or concerns.      Sincerely,      Enoc Robledo

## 2023-10-30 NOTE — PATIENT INSTRUCTIONS
It was a pleasure taking care of you today.  I've included a brief summary of our discussion and care plan from today's visit below.  Please review this information with your primary care provider.  ______________________________________________________________________    My recommendations are summarized as follows:  -- Present to Emergency Room for further evaluation of symptoms, fever, and hydration  -- Recommend stool studies with enteric panel and repeat C.Diff  -- Recommend abdominal imaging with CT enterography    -- To assist with ostomy output, recommend:  -- Increase Loperimide to 2 tabs 4 times per day  -- Restart fiber 2 tsp twice per day (can increase to 3 times per day)  --If loperimide (8 pills per day) and fiber still not keeping output <3517-3552 ml per day, would recommend restarting lomotil at 2 tabs, can further titrate  -- Avoid high sugar foods and drinks and anything with sugar alcohols, for hydration try Pedialyte instead.   -- Nutrition referral for ostomy diet and hydration    For liver tests:  -- Limit Tylenol use  -- Repeat liver function tests  -- Hepatitis serologies, ISAEL, antimitochondrial antibody, alpha-1, celiac serologies, SPEP, F-actin,         -- please see scheduling information provided below     Return to GI Clinic in 1 months to review your progress.    ______________________________________________________________________    How do I schedule labs, imaging studies, or procedures that were ordered in clinic today?     Labs: To schedule lab appointment at the [facility], use my chart or call [phone]. If you have a [facility] lab closer to home where you are regularly seen you can give them a call.     Procedures: If a colonoscopy, upper endoscopy, breath test, esophageal manometry, or pH impedence was ordered today, our endoscopy team will call you to schedule this. If you have not heard from our endoscopy team within a week, please call [phone] to  schedule.     Imaging Studies: If you were scheduled for a CT scan, X-ray, MRI, ultrasound, HIDA scan or other imaging study, please call [phone] to have this scheduled.     Referral: If a referral to another specialty was ordered, expect a phone call or follow instructions above. If you have not heard from anyone regarding your referral in a week, please call our clinic to check the status.     Who do I call with any questions after my visit?  Please be in touch if there are any further questions that arise following today's visit.  There are multiple ways to contact your gastroenterology care team.      During business hours, you may reach a Gastroenterology nurse at [phone]    To schedule or reschedule an appointment, please call [phone].     You can always send a secure message through [employer].  [employer] messages are answered by your nurse or doctor typically within 24 hours.  Please allow extra time on weekends and holidays.      For urgent/emergent questions after business hours, you may reach the on-call GI Fellow by contacting the [facility]  at [phone].     How will I get the results of any tests ordered?    You will receive all of your results.  If you have signed up for [employer], any tests ordered at your visit will be available to you after your provider reviews them.  Typically this takes 1-2 weeks.  If there are urgent results that require a change in your care plan, your provider or nurse will call you to discuss the next steps.      What is [employer]?  [employer] is a secure way for you to access all of your healthcare records from the [facility].  It is a web based computer program, so you can sign on to it from any location.  It also allows you to send secure messages to your care team.  I recommend signing up for [employer] access if you have not already done so and are comfortable with using a computer.      How to I schedule a follow-up visit?  If you  did not schedule a follow-up visit today, please call [phone] to schedule a follow-up office visit.      Sincerely,    Beti Issa PA-C  Division of Gastroenterology, Hepatology & Nutrition  [facility]

## 2023-10-30 NOTE — ED TRIAGE NOTES
Referred for high ostomy output and fever  Recent abdominal surgery in September with end ileostomy   Averaging about 1600ml daily from ostomy  Tmax 101.8 this morning     Triage Assessment (Adult)       Row Name 10/30/23 4168          Triage Assessment    Airway WDL WDL        Respiratory WDL    Respiratory WDL WDL        Skin Circulation/Temperature WDL    Skin Circulation/Temperature WDL WDL        Cardiac WDL    Cardiac WDL WDL        Peripheral/Neurovascular WDL    Peripheral Neurovascular WDL WDL        Cognitive/Neuro/Behavioral WDL    Cognitive/Neuro/Behavioral WDL WDL

## 2023-10-30 NOTE — H&P
[facility]    Internal Medicine History and Physical - [facility] 2 Service       Date of Admission:  10/30/2023    Chief Complaint   Fever, increased ostomy output    History is obtained from the patient    History of Present Illness   Doretha Yu is a 48 year old female admitted on 10/30/2023. 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 Pt s/p TAC with end ileostomy (laparoscopic) on 6/15/21 with subsequent parastomal hernia now s/p robotic protectectomy, ileostomy takedown, and creation of J-pouch on 9/5/23. 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 Pt presents with fever x 3 days and increased ostomy output x 2 weeks, admitted at recommendation of GI.     Pt states that she has had increased ostomy output for the last two weeks. Fever for the last 3 days. Pt states she has had very poor po intake for the last few days. No urinary sx, shortness of breath, cough. No recent travel. Lives with 17 y/o daughter. No recent abx.     MELANOMA HISTORY (from gunter note)  Secondary Malignant Neoplasm Lymph Node Axilla And Upper Limb (HCC)   11/7/2017 Initial Diagnosis   1. Labor Day of 2017: Ms. Fernandez noted a nodule in her left anterior axilla.  2. October 2017: A mammogram revealed a suspicious left axillary mass which was biopsied by ultrasound on October 5, 2017. The biopsy revealed a malignant melanoma (Gunter review completed, suggestive of metastatic malignant neoplasm, favoring melanoma).  3. October 11, 2017: PET-CT imaging revealed FDG-avid left axillary mass with no other disease.  4. October 23, 2017: Ms. Fernandez was taken to the operating room by Dr. Cool at the [facility] for left axillary lymphadenectomy. According to the patient,  20 lymph nodes were removed, only 1 of which was involved by melanoma.  5. November 7, 2017: Ms. Fernandez was seen in our Melanoma Clinic at [facility]. At that time, our recommendations were to proceed with adjuvant immunotherapy with nivolumab.  6. In May of 2018, Ms. Fernandez returned to see us for nivolumab-induced colitis that she has been suffering from for the past 8 weeks. Postoperatively, Ms. Fernandez initiated adjuvant nivolumab therapy, and during mid-February 2017, developed what appears to have been severe diarrhea that was believed initially to have been related to giardiasis and subsequently recognized to have been secondary to nivolumab therapy. Ms. Fernandez has been treated by her physicians in the [facility] with varying levels of immunosuppression over the ensuing weeks, with no clear benefit. She continues with multiple bowel movements, 10 to 12 per day, that are primarily consistent with mucus-appearing stool with fluid and streaks of blood     Review of Systems   The 10 point Review of Systems is negative other than noted in the HPI or here.     Past Medical History    I have reviewed this patient's medical history and updated it with pertinent information if needed.   Past Medical History:   Diagnosis Date    Abnormal MRI     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     Anxiety     Basal cell carcinoma     Cervical high risk HPV (human papillomavirus) test positive 12/13/2019    See problem list    Colitis     Depression     Diabetes mellitus, iatrogenic (H) 01/28/2020    Esophageal reflux     Inflammatory arthritis     Insomnia     Intestinal giardiasis 03/05/2018    Lumbago     left lower back pain    Lymphedema     Malignant melanoma (H)     Melanoma (H) 10/23/2017    Migraines     Mild persistent asthma     Morbid obesity with BMI of 40.0-44.9, adult (H)     STORMY (obstructive sleep apnea)     Prothrombin deficiency (H)     takes 81mg asa daily    Stroke (cerebrum) (H) 2004    During      TIA (transient ischemic attack)     Type 2 diabetes mellitus (H)     Ulcerative pancolitis (H)         Past Surgical History   I have reviewed this patient's surgical history and updated it with pertinent information if needed.  Past Surgical History:   Procedure Laterality Date    APPENDECTOMY  2004    COLONOSCOPY N/A 10/18/2017    Procedure: COLONOSCOPY;  Colon;  Surgeon: Debbie Stephens MD;  [facility]    COLONOSCOPY N/A 2018    Procedure: COMBINED COLONOSCOPY, SINGLE OR MULTIPLE BIOPSY/POLYPECTOMY BY BIOPSY;  colon;  Surgeon: Benita Schumacher MD;  Location: [facility]    COLONOSCOPY      multiple since  to present - about 6 total    DAVINCI ASSISTED TRANSANAL TOTAL MESORECTAL EXCISION N/A 2023    Procedure: COMPLETION PROCTECTOMY, ROBOT-ASSISTED, ILEAL POUCH ANASTAMOSIS;  Surgeon: Quinton Ram MD;  Location: [facility]    DISSECT LYMPH NODE AXILLA Left 10/23/2017    Procedure: DISSECT LYMPH NODE AXILLA;  Left Axillary Lymph Node Dissection ;  Surgeon: Laurent Cool MD;  Location: [facility]    EXAM UNDER ANESTHESIA PELVIC N/A 2020    Procedure: EXAM UNDER ANESTHESIA, PELVIS; with Cervical Biopsies, Vaginal Biopsy and Endocervical Curettings;  Surgeon: Melina Jung MD;  Location: [facility] OR    GYN SURGERY  ,         LAPAROSCOPIC ASSISTED COLECTOMY N/A 06/15/2021    Procedure: laparoscopic total abdominal colectomy, end ileostomy;  Surgeon: Quinton Ram MD;  [facility]: [facility]    REPAIR MOHS Left 2017    Procedure: REPAIR MOHS;  Left Upper Lid Moh's Reconstruction;  Surgeon: Kisha Bosch MD;  [facility]    SIGMOIDOSCOPY FLEXIBLE N/A 2023    Procedure: Sigmoidoscopy flexible;  Surgeon: Quinton Ram MD;  Location: [facility]        Social History   Social History     Tobacco Use    Smoking status: Former     Packs/day: 1.00     Years: 5.00     Additional pack years: 0.00     Total pack years: 5.00     Types: Cigarettes      Quit date: 3/20/1998     Years since quittin.6    Smokeless tobacco: Never   Vaping Use    Vaping Use: Never used   Substance Use Topics    Alcohol use: Not Currently    Drug use: Yes     Types: Marijuana     Comment: vape, edible       Family History   I have reviewed this patient's family history and updated it with pertinent information if needed.   Family History   Problem Relation Age of Onset    Cancer Mother 45        lung    Neurologic Disorder Mother         epilepsy    Lipids Father     Gastrointestinal Disease Father         diverticulitis     Depression Father     Colitis Father     Colon Cancer Father     Diverticulitis Father     Depression Sister     Cancer Maternal Grandmother     Blood Disease Maternal Grandmother         lymphoma     Arthritis Maternal Grandmother     Diabetes Maternal Grandmother     Depression Maternal Grandmother     Macular Degeneration Maternal Grandmother     Glaucoma Maternal Grandmother     Diabetes Maternal Grandfather     Cerebrovascular Disease Maternal Grandfather     Blood Disease Maternal Grandfather     Heart Disease Maternal Grandfather     Glaucoma Maternal Grandfather     Cancer Paternal Grandmother     Cancer - colorectal Paternal Grandmother     Colitis Paternal Grandmother     Colon Cancer Paternal Grandmother     Diverticulitis Paternal Grandmother     Respiratory Paternal Grandfather         emphysema     Colitis Paternal Grandfather     Colon Cancer Paternal Grandfather     Diverticulitis Paternal Grandfather     Heart Disease Daughter     Asthma Daughter     Melanoma No family hx of     Anesthesia Reaction No family hx of     Clotting Disorder No family hx of        Prior to Admission Medications   Prior to Admission Medications   Prescriptions Last Dose Informant Patient Reported? Taking?   Ostomy Supplies MISC   No No   Si each daily   acetaminophen (TYLENOL) 325 MG tablet   No No   Sig: Take 3 tablets (975 mg) by mouth every 6 hours    albuterol (PROAIR HFA/PROVENTIL HFA/VENTOLIN HFA) 108 (90 Base) MCG/ACT inhaler   No No   Sig: Inhale 2 puffs into the lungs every 4 hours as needed for shortness of breath / dyspnea   diphenoxylate-atropine (LOMOTIL) 2.5-0.025 MG tablet   No No   Sig: Take 1 tablet by mouth 2 times daily   famotidine (PEPCID) 20 MG tablet   No No   Sig: Take 1 tablet (20 mg) by mouth 2 times daily   hydrOXYzine (ATARAX) 25 MG tablet   No No   Sig: Take 1 tablet (25 mg) by mouth 2 times daily   loperamide (IMODIUM A-D) 2 MG tablet   No No   Sig: Take 1 tablet (2 mg) by mouth 4 times daily as needed for other (high ileostomy output)   loperamide (IMODIUM A-D) 2 MG tablet   No No   Sig: Take 2 tabs daily and increase as needed until output is apple sauce consistency. Max of 8 tabs (16 mg) per day.   medical cannabis (Patient's own supply)   Yes No   Sig: See Admin Instructions (The purpose of this order is to document that the patient reports taking medical cannabis.  This is not a prescription, and is not used to certify that the patient has a qualifying medical condition.)   menthol (ICY HOT) 5 % PTCH   No No   Sig: Apply 1 patch topically every 8 hours as needed for muscle soreness   menthol-zinc oxide (CALMOSEPTINE) 0.44-20.6 % OINT ointment   No No   Sig: Apply topically 4 times daily as needed for skin protection   metFORMIN (GLUCOPHAGE) 500 MG tablet   No No   Sig: TAKE 2 TABLETS BY MOUTH 2 TIMES DAILY WITH MEALS   Patient taking differently: Take 500 mg by mouth 2 times daily (with meals) TAKE 2 TABLETS BY MOUTH 2 TIMES DAILY WITH MEALS   methocarbamol (ROBAXIN) 750 MG tablet   No No   Sig: Take 1 tablet (750 mg) by mouth 4 times daily as needed for muscle spasms   ondansetron (ZOFRAN ODT) 4 MG ODT tab   No No   Sig: Take 1 tablet (4 mg) by mouth every 8 hours as needed for nausea or vomiting   Patient taking differently: Take 4 mg by mouth as needed for nausea or vomiting   ondansetron (ZOFRAN ODT) 4 MG ODT tab   No No    Sig: Take 1 tablet (4 mg) by mouth every 8 hours as needed for nausea   oxyCODONE (ROXICODONE) 5 MG tablet   No No   Sig: Take 1-2 tablets (5-10 mg) by mouth every 6 hours as needed for severe pain   phentermine (ADIPEX-P) 15 MG capsule   Yes No   Sig: Take 15 mg by mouth every morning   tolterodine ER (DETROL LA) 2 MG 24 hr capsule   No No   Sig: Take 1-2 capsules (2-4 mg) by mouth daily   venlafaxine (EFFEXOR) 50 MG tablet   No No   Sig: Take 1 tablet (50 mg) by mouth 2 times daily Total of 150 mg twice daily   venlafaxine (EFFEXOR) 75 MG tablet   No No   Sig: TAKE TWO TABLETS BY MOUTH TWICE A DAY      Facility-Administered Medications: None     Allergies   Allergies   Allergen Reactions    Bee Venom Swelling    Azithromycin Diarrhea    Erythromycin      Other reaction(s): GI intolerance, Vomiting    Fentanyl Other (See Comments)     sweating  sweating    Prochlorperazine Fatigue     Other reaction(s): Other (see comments)  Fatigue    Buspirone      Other reaction(s): GI intolerance  vomiting    Erythrocin Nausea and Vomiting    Gluten Meal      Celiac disease    Topamax [Topiramate]      Made her lethargic    Zithromax [Azithromycin Dihydrate] Diarrhea    Enbrel [Etanercept] Hives and Rash       Physical Exam   Vital Signs: Temp: 100  F (37.8  C) Temp src: Oral BP: 107/76 Pulse: 91   Resp: 18 SpO2: 90 % O2 Device: None (Room air)    Weight: 0 lbs 0 oz    General: Pt laying comfortably in bed, in no acute distress. Not requiring oxygen.   Cardio: Regular rate and rhythm. No murmurs, gallops, rubs.   Pulm: Clear to ascultation bilaterally without wheeze, crackles, rhonchi.   Abd: Hyperactive bowel sounds. TTP with large volume ostomy output  Lower extremities: No lower extremity edema present bilaterally.   Neuro: Alert and oriented to person, place, and time.   Psych: Appropriate mood and affect.       Assessment & Plan   Doretha Yu is a 48 year old female admitted on 10/30/2023. PMH of secondary melanoma  with subsequent nivolumab-induced ulcerative colitis, inflammatory arthritis, DM2. Pt s/p TAC with end ileostomy (laparoscopic) on 6/15/21 with subsequent parastomal hernia now s/p robotic protectectomy, ileostomy takedown, and creation of J-pouch on 9/5/23. 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 Pt presents with fever x 3 days and increased ostomy output x 2 weeks, admitted at recommendation of GI.     Nivolumab induced UC  Increased ostomy output  H/o nivolumab-induced ulcerative colitis s/p TAC with end ileostomy (laparoscopic) on 6/15/21 with subsequent parastomal hernia now s/p robotic protectectomy, ileostomy takedown, and creation of J-pouch on 9/5/23. Pt states since this time she has noticed increased ostomy output, but in the last two weeks much more so with correlating reduced PO intake. Has been seen in the ED on several occasions and has been getting OP IVF at an infusion center as output so significant. No blood in output. No recent abx.     FLUIDS   - IVF LR at 125 cc/hr, goal to match I/O    WORKUP   - Per GI as below    - Blood cultures   - C. Diff   - UC   - amylase- wnl   - Urine legionella   ABX   - Zosyn 10/30-*    CONSULTS   - GI consulted, appreciate recs  - Infectious Stool Studies: C. Difficile Toxin B PCR with reflex to C. Difficile Antigen and Toxins A/B EIA, Enteric Bacteria and Virus Panel PCR.  - Fecal Calprotectin.   - Trend daily labs: CRP, CBC, and CMP.   - CT Enterography for further assessment of bowel, infection/abscess, or other intra-abdominal pathology.   - IV Pantoprazole 40 mg BID.  - Consider general surgery consult, given prior history, defer for now. Pt does follow with OP surgery.     Potential concomitant pneumonia  CT enterography shows possible infiltrate, legionella could explain hyponatremia, fever, and  increased GI output, will send urine studies. Well covered with zosyn, will add azithromycin for atypical coverage while undergoing workup.   - urine legionella, urine strep  - Covid/flu testing  - Zosyn as above   - azithromycin added x 3 days 500 mg (10/30-11/1)    Hyponatremia  Likely hypovolemic hyponatremia in setting of increased UOP. To be complete can get baseline studies.   - Sosm, Uosm, FeNa  - Urine legionella    Hypomagnesemia  Likely 2/2 diarrhea.   -magnesium protocol    Chronic Transaminitis  - pending above workup  - As chronic and no documented hepatitis panel, will order     DM2  A1c 6.5% last 10/25. Takes metformin 500 BID as OP.   - Hold metformin  - LDSSI    Hypothyroidism  - do not see PTA meds on list, can ask if she takes in AM    Normocytic Anemia  Baseline Hb 13, here with Hb of 11.3. Plts wnl.     STORMY  - CPAP    Chronic Pain  - APAP  - oxycodone 5 mg q 4  - Dilaudid 0.2-0.5 q 3 for short term only    Anxiety  - PTA venlafaxine 150 mg BID    Melanoma history:   - see H&P subjective section for full history, follows with [facility].     DISCHARGE PLANNING:     NEW MEDS:   -    MEDS TO STOP/HOLD:   -    FOLLOWUP:   -    Diet: No diet orders on file  Fluids: 125cc/hr LR  Lines: PIV  Major Catheter: Not present  DVT Prophylaxis: Pneumatic Compression Devices  Code Status: Prior     Expected Discharge Date: 10/31/2023                 Pt staffed with Dr. Robledo who agrees with this plan.     Kathy Brasher DO   Internal Medicine PGY2  [facility]  Please use on call [employer] for paging    Data     I have personally reviewed the following data over the past 24 hrs:    8.2  \   11.3 (L)   / 294     131 (L) 96 (L) 3.4 (L) /  132 (H)   3.5 21 (L) 0.83 \     ALT: 146 (H) AST: 92 (H) AP: 258 (H) TBILI: 0.2   ALB: 3.8 TOT PROTEIN: 7.1 LIPASE: N/A     TSH: 2.55 T4: N/A A1C: N/A     Procal: N/A CRP: 133.00 (H) Lactic Acid: 0.8       INR:  1.07 PTT:  29   D-dimer:  N/A Fibrinogen:  N/A       Imaging  results reviewed over the past 24 hrs:   Recent Results (from the past 24 hour(s))   CT Enterography with Contrast    Narrative    EXAMINATION: CT ENTEROGRAPHY WITH CONTRAST, 10/30/2023 6:06 PM    INDICATION: -- CT enterography for further assessment of bowel,  infection/abscess or other intraabdominal pathology    COMPARISON STUDY: CT chest abdomen and pelvis 10/23/2023    TECHNIQUE: CT scan of the abdomen and pelvis was performed on  multidetector CT scanner using volumetric acquisition technique and  images were reconstructed in multiple planes with variable thickness  and reviewed on dedicated workstations.     CONTRAST: 88 mm Isovue 360 injected IV with 1500 mL Breeza oral  contrast    CT scan radiation dose is optimized to minimum requisite dose using  automated dose modulation techniques.    FINDINGS:    Lower thorax: New left basilar consolidative opacity in the  posterior/lateral lower lobe.  Trace left pleural effusion with  adjacent atelectasis.    Liver: No mass. No intrahepatic biliary ductal dilation.  Geographic  relative hyperenhancement of the portion of segment 7, likely  perfusional.    Biliary System: Normal gallbladder. No extrahepatic biliary ductal  dilation.    Pancreas: Significant fatty atrophy of the pancreatic parenchyma,  greatest at the pancreatic body and tail.    Adrenal glands: No mass or nodules    Spleen: Normal.    Kidneys: No suspicious mass, obstructing calculus or hydronephrosis.    Gastrointestinal tract: Colectomy and completion proctectomy with  ileal J-pouch formation. Fluid-filled small bowel which is not  abnormally distended. No focal lesions. No inflammatory stranding.    Mesentery/peritoneum/retroperitoneum: No mass. No free fluid or air.    Lymph nodes: No significant lymphadenopathy.    Vasculature: Patent major abdominal vasculature.    Pelvis: Urinary bladder is normal.  The uterus is within normal  limits. Bilateral thin-walled ovarian cysts measuring up to 3.3 cm  on  the left and 3.2 cm on the righ. T    Osseous structures: No aggressive or acute osseous lesion.      Soft tissues: Soft tissue stranding in the anterior abdomen and right  of midline, likely postoperative.      Impression    IMPRESSION:   1. Surgical changes of total colectomy and completion proctectomy with  right lower quadrant diverting ileostomy. No evidence of infectious or  inflammatory bowel pathology. No intra-abdominal abscess.  2. New left lower lobe consolidative opacity, compatible with  pneumonia.  3. Trace left pleural effusion with adjacent atelectasis.    I have personally reviewed the examination and initial interpretation  and I agree with the findings.    KEESHA WALKER,          SYSTEM ID:  [id number]

## 2023-10-30 NOTE — PROGRESS NOTES
Brief GI Luminal Service Note:    The patient was not seen or examined today. This note is a product of chart review.     Received call from the ED Dr. Ismael Spangler. Patient presents with reported fever, increased ostomy output, new left-sided abdominal pain, was [facility].  - Normotensive, borderline febrile with temperature of 100 degrees fahrenheit.   - Labs: elevated LFTs, normal bilirubin, WBC within normal limits, hemoglobin slightly decreased, Hyponatremic, elevated CRP (133).     Recommendations:   -- Please place a routine GI Luminal Service consult.   - Full GI Luminal Consult note to follow tomorrow 10/31/2023.   -- Infectious Stool Studies: C. Difficile Toxin B PCR with reflex to C. Difficile Antigen and Toxins A/B EIA, Enteric Bacteria and Virus Panel PCR.  -- Fecal Calprotectin.   -- Trend daily labs: CRP, CBC, and CMP.   -- CT Enterography for further assessment of bowel, infection/abscess, or other intra-abdominal pathology.   -- Infectious work-up per primary team.   -- Maintain 2 large bore IVs, 18G or larger.  -- IV Pantoprazole 40 mg BID.  -- Continue Supportive Cares  - Adequate volume resuscitation with IVF, pRBCs.  - Monitor Hemoglobin closely. Transfuse to keep Hgb > 7 g/dL from GI standpoint.   - Monitor Platelets closely. Keep PLT > 50 10e3/uL from GI standpoint.  - Maintain hemodynamics: MAP >65 mmHg and HR <100.  - Monitor and optimize electrolytes.  - Reposition every 30 minutes while awake.  -- Avoid NSAIDs.  -- Analgesics/Antiemetics per primary team.  -- If the patient experiences overt GI bleeding with hemodynamic instability, please page the GI Luminal Service (listed on [facility]).     Thank you for allowing us to participate in the care of this patient. Please do not hesitate to page the GI service with any questions or concerns.     Plan discussed and agreed upon with Dr. Marco Antonio Bravo, GI Luminal Service Staff Physician.     Chantelle Quintanilla PA-C  Inpatient  Gastroenterology Service  [facility]  Pager: *[phone]  Text Page

## 2023-10-30 NOTE — PROGRESS NOTES
Virtual Visit Details    Type of service:  Video Visit   Video Start Time:  11:03 AM  Video End Time: 11:46 AM    Originating Location (pt. Location): Home    Distant Location (provider location):  Off-site  Platform used for Video Visit: [facility]            IBD CLINIC VISIT     CC/REFERRING MD:  No ref. provider found  REASON FOR CONSULTATION: high ilestomy output, elevated LFTs    ASSESSMENT/PLAN    48 year old female with history of UC now s/p total abdominal colectomy and end ileostomy on 6/15/2021 with Dr. Ram and robotic completion proctectomy with creation of J-pouch with IPAA, takedown of end ileostomy, and diverting loop ileostomy 9/5/23. Following up in clinic due to high ileostomy output and elevated LFTs.    Given new fever up to 101.8 today despite high dose tylenol, feeling very dehydrated, weak and unable to get out of bed despite home IVF recommended she go to emergency room for further evaluation. Unable to complete physical exam or vitals today due to virtual visit. She had family (father and step mother) at home helping her today that are able to bring her.      1. 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 Surgical path consistent with moderate disease. She continues to follow closely with colorectal surgery at this time.  Since second surgery has had increased outputs, worse over past 2 to 3 weeks with estimated 1600 cc out daily. She has constant nausea and new onset significant left sided abdominal pain today. She just started home IV fluids 3 days a week, despite this feels dehydrated and has new onset of fever despite Tylenol (101.8 today). I was not able to complete physical exam or vitals today due to virtual nature of visit.  Given worsening symptoms and fever would recommend emergency department  evaluation for potential infection or other process.  She likely also needs fluids.      As an outpatient, pending infectious stool studies reviewed recommendations outlined below for decreasing ostomy output including maximizing loperamide, restarting fiber per colorectal's prior recommendation, and potential to restart Lomotil pending output with these changes. Could consider tincture of opium in the future.  Recommend continuing home IV fluids, pending hydration status may need more frequent. Her beverages may also be leading to more output with Gatorade being hypertonic and possible containing sugar alcohols. She is also drinking plain water that may be pulling Na and fluid into lumen. Would recommend ORS such as pedialyte and referral to dietitian for further evaluation and recommendations of hydration and dietary choices that would help slow ostomy.    Recommend patient go to ED for evaluation and treatment.  Consider following for GI work-up with additional labs and studies as appropriate:  -- Repeat labs for hydration and infection  -- Enteric panel  -- Repeat C.Diff   -- CT enterography for further assessment of bowel, infection/abscess or other intraabdominal pathology    To assist with ostomy output, recommend:  -- Increase Loperimide to 2 tabs 4 times per day  -- Restart fiber 2 tsp twice per day (can increase to 3 times per day)  --If loperimide (8 pills per day) and fiber still not keeping output <5962-4900 ml per day, would recommend restarting lomotil at 2 tabs, can further titrate  -- Avoid high sugar foods and drinks and anything with sugar alcohols, for hydration try Pedialyte instead.   -- Nutrition referral for ostomy diet and hydration        2. Elevated LFTs  Elevation of Alk Phos, ALT, and AST since 7/11/2023. Slight improvement with last draw. Would recommend full lab work up with hepatitis serologies, ISAEL, antimitochondrial antibody, alpha-1 antitrypsin, celiac serologies, SPEP, F-actin.  When more stable, would recommend MRCP for further evaluation, particularly with risk of PSC with history of UC. She does have history of fatty liver and biliary dyskinesia. Note she does have significant tylenol use recently, also possibly contributing.     -- Limit Tylenol use  -- Repeat LFTs  -- Hepatitis serologies, ISAEL, antimitochondrial antibody, alpha-1, celiac serologies, SPEP, F-actin,   -- MRCP when stable    3.  Seronegative RA: Patient has followed with rheumatology.       4.  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  It was a pleasure to participate in the care of this patient; please contact us with any further questions.  I spent a total of 90 minutes, face to face, was spent with this patient, >50% of which was counseling regarding the above delineated issues.    This note was created with voice recognition software, and while reviewed for accuracy, typos may remain.     Beti Issa PA-C  Inflammatory Bowel Disease Program   Division of Gastroenterology, Hepatology and Nutrition  [facility]        IBD HISTORY  Age at diagnosis: 2021  Extent of disease: proctosigmoid   Current UC medications: None  Prior UC surgeries:  total abdominal colectomy and end ileostomy 6/15/21   Prior IBD Medications:   Infliximab, 1 dose in the hospital good response  Vedolizumab, completed induction dosing  Prednisone, minimal response to high-dose oral and IV with significant weight gain  5-ASA, no response  Topical therapies to include enemas, no response    DRUG MONITORING  TPMT enzyme activity:     6-TGN/6-MMPN levels:    Biologic concentration:       DISEASE ASSESSMENT  Endoscopic assessment:   EGD 2/14/23 ([facility])  Post-op Diagnoses:        - LA Grade A reflux esophagitis.        - Erythematous mucosa in the prepyloric region of the stomach. Biopsied.        - A few fundic gland polyps.        - A  single non-bleeding angioectasia in the duodenum (posterior wall).        - Biopsies were taken with a cold forceps for evaluation of celiac        disease.        - No clear cause of abdominal pain identified. No evidence of recurrent        melanoma.     Surgical Path:  Colon path 6/15/23:  FINAL DIAGNOSIS:   COLON, RESECTION:   - Moderate chronic active colitis with crypt abscess formation ([place]   grade 3)   - Negative for dysplasia and malignancy   - Viable surgical margins   - Two benign lymph nodes (0/2)     Surgical Pathology (9/5/2023):  A. ILEUM, ILEOSTOMY TRIM;  Ileo-cutaneous anastomotic junction with nonspecific mucosa inflammation, congestion, and other features consistent with ileostomy; no dysplasia or malignancy  B. RECTUM, COMPLETION PROCTECTOMY:  Mildly active chronic proctitis with:   -Neutrophilic cryptitis, mucosal atrophy and prominent reactive lymphoid hyperplasias  -Reactive mesorectal lymph nodes;  no dysplasia or malignancy  C. COLON, ANASTOMOTIC RINGS; EXCISION:   -Portions of small intestinal mucosa and wall with no morphologic abnormalities   -Portion of rectal mucosa and wall with chronic proctitis; no dysplasia or malignancy     Enterography:   -- MRE 12/27/2022 no findings for acute inflammation of the bowel    Fecal calprotectin: --    C diff: negative 10/26/23, negative 1/12/23,  was positive 3/2021 treated with vanco taper (at [place])     HPI:   Doretha Yu is a 48 year old female with significant PMH of melanoma unknown primary (2017), s/p left axillary LN dissection (2017, Dr. Cool) in remission, history of adjuvant nivolumab induced colitis in 2018 treated with remicade (x1 at [place]) and Entyvio, h/o c.diff (recent bout, started PO vanc 4/7), seronegative RA on prednisone & tocilizumab, steroid induced DM, history of cushings, chronic pain, with diagnosis of UC in 2021, received Entyvio x2 mostly steroid dependent x3 years.  She had multiple readmissions for hematochezia, acute  "on chronic abdominal & rectal pain, nausea for refractory UC and concerns regarding risks associated with melanoma recurrence with using standard UC maintenance medications.  She underwent total abdominal colectomy and end ileostomy on 6/15/2021 with Dr. Ram. She is now s/p robotic completion proctectomy with creation of J-pouch with IPAA, takedown of end ileostomy, and diverting loop ileostomy 9/5/23. Plan for ileostomy closure 12/27/23. She was last seen in IBD clinic by Zeferino Sanchez PA-C in 2021.    She last saw Dr. Ram 10/10/23 and has been in close communication with their clinic regarding recent or high ileostomy output. She reports that ostomy output had been higher since surgery but really increased in past 2-3 weeks. She has been to ED twice in past week and received fluids and antiemetics. Labs with WBC mildly elevated to 12. Cr elevated to ~1. Her LFTs (alk phos, AST, ALT) have also been high with some mild improvement per most recent labs 10/27/23. C.diff was negative. CT Chest abdomen pelvis done 10/24/23 showed postsurgical changes, no abscess. She started IVF with [facility] last week with schedule of M, W, F. She notes that she has small gauge needle and takes ~10 hrs to get in 1 L.     Despite IVF she reports today that she feels extremely dehydrated. Reports she feels very weak and has not been able to get out of bed past 2 days. She also started having a fever on Saturday, now up to 101.8 degrees day. She infused 1 L yesterday. Currently has 1600 ml output per day. Brown/green watery output, pills in output, unclear if outer covering. She is drinking Gatorade \"hydrate\" plain water and liquid IV . She has not been able to eat significant amounts other than some jello yesterday. She is currently taking 2 tabs loperamide about 3 times per day (as able when she is awake), not taking fiber, not taking lomotil. She reports constant nausea, no vomiting, takes zofran every 4-5 " "hours (4 pills per day). Has medical canabis but hasn't used last couple days. She has also been experiencing abdominal pain, was left sided shooting pains now today sharp left sided pain under ribs, bringing her to tears. Has been taking 1000 mg tylenol every 6 hours over past week for abdominal pain.    Stoma is red. Ostomy bag popping off and leaking due to gas.    In terms of her pouch she is no longer having blood. Now brown or green mucous, empties when she urinates. Denies any abnormal vaginal output (did get menses).    She has not started Flagyl or other antibiotics.    Today during visit she is tearful and repeats  \"Something not right\"         Extra intestinal manifestations of IBD:  No uveitis/episcleritis  No aphthous ulcers   No arthritis   No erythema nodosum/pyoderma gangrenosum.     sIBDQ:  IBDQ Score Date IBDQ - Total Score  (Higher score better)   4/10/2018   7:20 AM 32          ROS:    Constitutional, HEENT, cardiovascular, pulmonary, GI, , musculoskeletal, neuro, skin, endocrine and psych systems are negative, except as otherwise noted.    PHYSICAL EXAMINATION:  Constitutional: aaox3, cooperative, pleasant, not dyspneic/diaphoretic, no acute distress  Vitals reviewed: There were no vitals taken for this visit.  Wt:   Wt Readings from Last 2 Encounters:   10/26/23 84.8 kg (187 lb)   10/23/23 85.3 kg (188 lb)      General appearance: Healthy appearing adult, in no acute distress  Eyes: Sclera anicteric, Pupils round and reactive to light  Ears, nose, mouth and throat: No obvious external lesions of ears and nose, Hearing intact  Neck: Symmetric, No obvious external lesions  Respiratory: Normal respiration, no use of accessory muscles   MSK: Gait normal  Skin: No rashes or jaundice   Psychiatric: Oriented to person, place and time, Appropriate mood and affect.       PERTINENT PAST MEDICAL HISTORY:  Past Medical History:   Diagnosis Date    Abnormal MRI     Abnormal MRI and postive prothrombin " genetic mutation.     Anxiety     Basal cell carcinoma     Cervical high risk HPV (human papillomavirus) test positive 2019    See problem list    Colitis     Depression     Diabetes mellitus, iatrogenic (H) 2020    Esophageal reflux     Inflammatory arthritis     Insomnia     Intestinal giardiasis 2018    Lumbago     left lower back pain    Lymphedema     Malignant melanoma (H)     Melanoma (H) 10/23/2017    Migraines     Mild persistent asthma     Morbid obesity with BMI of 40.0-44.9, adult (H)     STORMY (obstructive sleep apnea)     Prothrombin deficiency (H)     takes 81mg asa daily    Stroke (cerebrum) (H)     During     TIA (transient ischemic attack)     Type 2 diabetes mellitus (H)     Ulcerative pancolitis (H)        PREVIOUS SURGERIES:  Past Surgical History:   Procedure Laterality Date    APPENDECTOMY      COLONOSCOPY N/A 10/18/2017    Procedure: COLONOSCOPY;  Colon;  Surgeon: Debbie Stephens MD;  [facility]    COLONOSCOPY N/A 2018    Procedure: COMBINED COLONOSCOPY, SINGLE OR MULTIPLE BIOPSY/POLYPECTOMY BY BIOPSY;  colon;  Surgeon: Benita Schumacher MD;  Location: [facility]    COLONOSCOPY      multiple since  to present - about 6 total    DAVINCI ASSISTED TRANSANAL TOTAL MESORECTAL EXCISION N/A 2023    Procedure: COMPLETION PROCTECTOMY, ROBOT-ASSISTED, ILEAL POUCH ANASTAMOSIS;  Surgeon: Quinton Ram MD;  [facility]    DISSECT LYMPH NODE AXILLA Left 10/23/2017    Procedure: DISSECT LYMPH NODE AXILLA;  Left Axillary Lymph Node Dissection ;  Surgeon: Laurent Cool MD;  [facility]    EXAM UNDER ANESTHESIA PELVIC N/A 2020    Procedure: EXAM UNDER ANESTHESIA, PELVIS; with Cervical Biopsies, Vaginal Biopsy and Endocervical Curettings;  Surgeon: Melina Jung MD;  Location: [facility]    GYN SURGERY  ,         LAPAROSCOPIC ASSISTED COLECTOMY N/A 06/15/2021    Procedure: laparoscopic total abdominal colectomy, end  ileostomy;  Surgeon: Quinton Ram MD;  [facility]: [facility]    REPAIR MOHS Left 07/25/2017    Procedure: REPAIR MOHS;  Left Upper Lid Moh's Reconstruction;  Surgeon: Kisha Bosch MD;  [facility]    SIGMOIDOSCOPY FLEXIBLE N/A 9/5/2023    Procedure: Sigmoidoscopy flexible;  Surgeon: Quinton Ram MD;  Location: [facility]       ALLERGIES:     Allergies   Allergen Reactions    Bee Venom Swelling    Azithromycin Diarrhea    Erythromycin      Other reaction(s): GI intolerance, Vomiting    Fentanyl Other (See Comments)     sweating  sweating    Prochlorperazine Fatigue     Other reaction(s): Other (see comments)  Fatigue    Buspirone      Other reaction(s): GI intolerance  vomiting    Erythrocin Nausea and Vomiting    Gluten Meal      Celiac disease    Topamax [Topiramate]      Made her lethargic    Zithromax [Azithromycin Dihydrate] Diarrhea    Enbrel [Etanercept] Hives and Rash       PERTINENT MEDICATIONS:    Current Outpatient Medications:     acetaminophen (TYLENOL) 325 MG tablet, Take 3 tablets (975 mg) by mouth every 6 hours, Disp: 100 tablet, Rfl: 0    albuterol (PROAIR HFA/PROVENTIL HFA/VENTOLIN HFA) 108 (90 Base) MCG/ACT inhaler, Inhale 2 puffs into the lungs every 4 hours as needed for shortness of breath / dyspnea, Disp: 18 g, Rfl: 1    diphenoxylate-atropine (LOMOTIL) 2.5-0.025 MG tablet, Take 1 tablet by mouth 2 times daily, Disp: 60 tablet, Rfl: 2    famotidine (PEPCID) 20 MG tablet, Take 1 tablet (20 mg) by mouth 2 times daily, Disp: 60 tablet, Rfl: 0    hydrOXYzine (ATARAX) 25 MG tablet, Take 1 tablet (25 mg) by mouth 2 times daily, Disp: 60 tablet, Rfl: 1    loperamide (IMODIUM A-D) 2 MG tablet, Take 2 tabs daily and increase as needed until output is apple sauce consistency. Max of 8 tabs (16 mg) per day., Disp: 240 tablet, Rfl: 1    loperamide (IMODIUM A-D) 2 MG tablet, Take 1 tablet (2 mg) by mouth 4 times daily as needed for other (high ileostomy output), Disp: 120 tablet,  Rfl: 1    medical cannabis (Patient's own supply), See Admin Instructions (The purpose of this order is to document that the patient reports taking medical cannabis.  This is not a prescription, and is not used to certify that the patient has a qualifying medical condition.), Disp: , Rfl:     menthol (ICY HOT) 5 % PTCH, Apply 1 patch topically every 8 hours as needed for muscle soreness, Disp: 10 patch, Rfl: 0    menthol-zinc oxide (CALMOSEPTINE) 0.44-20.6 % OINT ointment, Apply topically 4 times daily as needed for skin protection, Disp: 71 g, Rfl: 0    metFORMIN (GLUCOPHAGE) 500 MG tablet, TAKE 2 TABLETS BY MOUTH 2 TIMES DAILY WITH MEALS (Patient taking differently: Take 500 mg by mouth 2 times daily (with meals) TAKE 2 TABLETS BY MOUTH 2 TIMES DAILY WITH MEALS), Disp: 360 tablet, Rfl: 1    methocarbamol (ROBAXIN) 750 MG tablet, Take 1 tablet (750 mg) by mouth 4 times daily as needed for muscle spasms, Disp: 40 tablet, Rfl: 0    ondansetron (ZOFRAN ODT) 4 MG ODT tab, Take 1 tablet (4 mg) by mouth every 8 hours as needed for nausea, Disp: 20 tablet, Rfl: 0    ondansetron (ZOFRAN ODT) 4 MG ODT tab, Take 1 tablet (4 mg) by mouth every 8 hours as needed for nausea or vomiting (Patient taking differently: Take 4 mg by mouth as needed for nausea or vomiting), Disp: 10 tablet, Rfl: 1    Ostomy Supplies MISC, 20 each daily, Disp: 20 each, Rfl: 11    oxyCODONE (ROXICODONE) 5 MG tablet, Take 1-2 tablets (5-10 mg) by mouth every 6 hours as needed for severe pain, Disp: 40 tablet, Rfl: 0    phentermine (ADIPEX-P) 15 MG capsule, Take 15 mg by mouth every morning, Disp: , Rfl:     tolterodine ER (DETROL LA) 2 MG 24 hr capsule, Take 1-2 capsules (2-4 mg) by mouth daily, Disp: 10 capsule, Rfl: 0    venlafaxine (EFFEXOR) 50 MG tablet, Take 1 tablet (50 mg) by mouth 2 times daily Total of 150 mg twice daily, Disp: 180 tablet, Rfl: 1    venlafaxine (EFFEXOR) 75 MG tablet, TAKE TWO TABLETS BY MOUTH TWICE A DAY, Disp: 360 tablet, Rfl:  "0  No current facility-administered medications for this visit.    Facility-Administered Medications Ordered in Other Visits:     lidocaine 1 % 9 mL, 9 mL, Intradermal, Once, Anna Naidu MD    sodium bicarbonate 8.4 % injection 1 mEq, 1 mEq, Intradermal, Once, Anna Naidu MD    SOCIAL HISTORY:  Social History     Socioeconomic History    Marital status:      Spouse name: Not on file    Number of children: Not on file    Years of education: Not on file    Highest education level: Not on file   Occupational History    Not on file   Tobacco Use    Smoking status: Former     Packs/day: 1.00     Years: 5.00     Additional pack years: 0.00     Total pack years: 5.00     Types: Cigarettes     Quit date: 3/20/1998     Years since quittin.6    Smokeless tobacco: Never   Vaping Use    Vaping Use: Never used   Substance and Sexual Activity    Alcohol use: Not Currently    Drug use: Yes     Types: Marijuana     Comment: vape, edible    Sexual activity: Not Currently     Partners: Male     Birth control/protection: Surgical   Other Topics Concern    Parent/sibling w/ CABG, MI or angioplasty before 65F 55M? No   Social History Narrative    20 y.o- patient's mother   of lung cancer. She had to take care of her younger sister.    2012- patient's  had a heart attack with stents placed, followed by cardiac rehabilitation    2000 TO 2012  was in [facility] for depression    2013 patient's  went through alcohol rehabilitation at [facility]            They have attended couple counseling a couple of times and patient went to the family program for chemical dependency.    Patient denies alcohol or drug use and herself            Has 2 children, girls ages 17 and 14. Oldest has been a \"trouble maker.\"     For a while she was working 3 jobs since her  was ill. 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 Reports " her job is very stressful.         Social Determinants of Health     Financial Resource Strain: High Risk (6/28/2021)    Overall Financial Resource Strain (CARDIA)     Difficulty of Paying Living Expenses: Very hard   Food Insecurity: No Food Insecurity (6/28/2021)    Hunger Vital Sign     Worried About Running Out of Food in the Last Year: Never true     Ran Out of Food in the Last Year: Never true   Transportation Needs: No Transportation Needs (6/28/2021)    PRAPARE - Transportation     Lack of Transportation (Medical): No     Lack of Transportation (Non-Medical): No   Physical Activity: Not on file   Stress: Stress Concern Present (6/25/2021)    [employer] [place] [employer] - Occupational Stress Questionnaire     Feeling of Stress : Very much   Social Connections: Unknown (6/25/2021)    Social Connection and Isolation Panel [NHANES]     Frequency of Communication with Friends and Family: Not asked     Frequency of Social Gatherings with Friends and Family: Not asked     Attends Zoroastrian Services: Not asked     Active Member of Clubs or Organizations: Not asked     Attends Club or Organization Meetings: Not asked     Marital Status:    Interpersonal Safety: Unknown (6/25/2021)    Humiliation, Afraid, Rape, and Kick questionnaire     Fear of Current or Ex-Partner: Not asked     Emotionally Abused: Not asked     Physically Abused: Not asked     Sexually Abused: Not asked   Housing Stability: Not on file       FAMILY HISTORY:  Father with history of colon polyps.  Paternal grandmother with history of colon cancer in her 50s or 60s.  Father did also history of diverticulitis as well as IBS.  Her child has been diagnosed with IBS.  No history of Crohn's disease.  No history of ulcerative colitis.   Family History   Problem Relation Age of Onset    Cancer Mother 45        lung    Neurologic Disorder Mother         epilepsy    Lipids Father     Gastrointestinal Disease Father         diverticulitis      Depression Father     Colitis Father     Colon Cancer Father     Diverticulitis Father     Depression Sister     Cancer Maternal Grandmother     Blood Disease Maternal Grandmother         lymphoma     Arthritis Maternal Grandmother     Diabetes Maternal Grandmother     Depression Maternal Grandmother     Macular Degeneration Maternal Grandmother     Glaucoma Maternal Grandmother     Diabetes Maternal Grandfather     Cerebrovascular Disease Maternal Grandfather     Blood Disease Maternal Grandfather     Heart Disease Maternal Grandfather     Glaucoma Maternal Grandfather     Cancer Paternal Grandmother     Cancer - colorectal Paternal Grandmother     Colitis Paternal Grandmother     Colon Cancer Paternal Grandmother     Diverticulitis Paternal Grandmother     Respiratory Paternal Grandfather         emphysema     Colitis Paternal Grandfather     Colon Cancer Paternal Grandfather     Diverticulitis Paternal Grandfather     Heart Disease Daughter     Asthma Daughter     Melanoma No family hx of     Anesthesia Reaction No family hx of     Clotting Disorder No family hx of        Past/family/social history reviewed and no changes

## 2023-10-30 NOTE — PROGRESS NOTES
Back in the ED today  ml RN will follow up when the patient is discharged from the hospital  Patient was admitted into the  hospital     [facility]   Gisel Marte RN, Care Coordinator   [facility]   E-mail [email or web address]   [phone]

## 2023-10-31 ENCOUNTER — PATIENT OUTREACH (OUTPATIENT)
Dept: CARE COORDINATION | Facility: CLINIC | Age: 48
End: 2023-10-31

## 2023-10-31 LAB
ADV 40+41 DNA STL QL NAA+NON-PROBE: NEGATIVE
ALBUMIN SERPL BCG-MCNC: 3.3 G/DL (ref 3.5–5.2)
ALP SERPL-CCNC: 207 U/L (ref 35–104)
ALT SERPL W P-5'-P-CCNC: 104 U/L (ref 0–50)
ANION GAP SERPL CALCULATED.3IONS-SCNC: 10 MMOL/L (ref 7–15)
AST SERPL W P-5'-P-CCNC: 49 U/L (ref 0–45)
ASTRO TYP 1-8 RNA STL QL NAA+NON-PROBE: NEGATIVE
ATRIAL RATE - MUSE: 95 BPM
BASOPHILS # BLD AUTO: 0 10E3/UL (ref 0–0.2)
BASOPHILS NFR BLD AUTO: 1 %
BILIRUB SERPL-MCNC: 0.2 MG/DL
BUN SERPL-MCNC: 3.7 MG/DL (ref 6–20)
C CAYETANENSIS DNA STL QL NAA+NON-PROBE: NEGATIVE
C DIFF TOX B STL QL: NEGATIVE
CALCIUM SERPL-MCNC: 8.8 MG/DL (ref 8.6–10)
CAMPYLOBACTER DNA SPEC NAA+PROBE: NEGATIVE
CHLORIDE SERPL-SCNC: 101 MMOL/L (ref 98–107)
CREAT SERPL-MCNC: 0.83 MG/DL (ref 0.51–0.95)
CREAT SERPL-MCNC: 0.9 MG/DL (ref 0.51–0.95)
CREAT UR-MCNC: 111 MG/DL
CRP SERPL-MCNC: 105 MG/L
CRYPTOSP DNA STL QL NAA+NON-PROBE: NEGATIVE
DEPRECATED HCO3 PLAS-SCNC: 26 MMOL/L (ref 22–29)
DIASTOLIC BLOOD PRESSURE - MUSE: NORMAL MMHG
E COLI O157 DNA STL QL NAA+NON-PROBE: NORMAL
E HISTOLYT DNA STL QL NAA+NON-PROBE: NEGATIVE
EAEC ASTA GENE ISLT QL NAA+PROBE: NEGATIVE
EC STX1+STX2 GENES STL QL NAA+NON-PROBE: NEGATIVE
EGFRCR SERPLBLD CKD-EPI 2021: 87 ML/MIN/1.73M2
EOSINOPHIL # BLD AUTO: 0 10E3/UL (ref 0–0.7)
EOSINOPHIL NFR BLD AUTO: 1 %
EPEC EAE GENE STL QL NAA+NON-PROBE: NEGATIVE
ERYTHROCYTE [DISTWIDTH] IN BLOOD BY AUTOMATED COUNT: 16.6 % (ref 10–15)
ETEC LTA+ST1A+ST1B TOX ST NAA+NON-PROBE: NEGATIVE
FLUAV RNA SPEC QL NAA+PROBE: NEGATIVE
FLUBV RNA RESP QL NAA+PROBE: NEGATIVE
FRACT EXCRET NA UR+SERPL-RTO: NORMAL %
G LAMBLIA DNA STL QL NAA+NON-PROBE: NEGATIVE
GLUCOSE BLDC GLUCOMTR-MCNC: 100 MG/DL (ref 70–99)
GLUCOSE BLDC GLUCOMTR-MCNC: 142 MG/DL (ref 70–99)
GLUCOSE BLDC GLUCOMTR-MCNC: 154 MG/DL (ref 70–99)
GLUCOSE BLDC GLUCOMTR-MCNC: 202 MG/DL (ref 70–99)
GLUCOSE BLDC GLUCOMTR-MCNC: 97 MG/DL (ref 70–99)
GLUCOSE SERPL-MCNC: 99 MG/DL (ref 70–99)
HAV IGM SERPL QL IA: NONREACTIVE
HBV CORE IGM SERPL QL IA: NONREACTIVE
HBV SURFACE AG SERPL QL IA: NONREACTIVE
HCT VFR BLD AUTO: 33.5 % (ref 35–47)
HCV AB SERPL QL IA: NONREACTIVE
HGB BLD-MCNC: 10.8 G/DL (ref 11.7–15.7)
IMM GRANULOCYTES # BLD: 0.1 10E3/UL
IMM GRANULOCYTES NFR BLD: 1 %
INTERPRETATION ECG - MUSE: NORMAL
L PNEUMO1 AG UR QL IA: NEGATIVE
LYMPHOCYTES # BLD AUTO: 1.7 10E3/UL (ref 0.8–5.3)
LYMPHOCYTES NFR BLD AUTO: 27 %
MCH RBC QN AUTO: 26.9 PG (ref 26.5–33)
MCHC RBC AUTO-ENTMCNC: 32.2 G/DL (ref 31.5–36.5)
MCV RBC AUTO: 83 FL (ref 78–100)
MONOCYTES # BLD AUTO: 0.6 10E3/UL (ref 0–1.3)
MONOCYTES NFR BLD AUTO: 9 %
MRSA DNA SPEC QL NAA+PROBE: NEGATIVE
NEUTROPHILS # BLD AUTO: 3.9 10E3/UL (ref 1.6–8.3)
NEUTROPHILS NFR BLD AUTO: 61 %
NOROVIRUS GI+II RNA STL QL NAA+NON-PROBE: NEGATIVE
NRBC # BLD AUTO: 0 10E3/UL
NRBC BLD AUTO-RTO: 0 /100
OSMOLALITY SERPL: 271 MMOL/KG (ref 275–295)
P AXIS - MUSE: 48 DEGREES
P SHIGELLOIDES DNA STL QL NAA+NON-PROBE: NEGATIVE
PLATELET # BLD AUTO: 249 10E3/UL (ref 150–450)
POTASSIUM SERPL-SCNC: 3.7 MMOL/L (ref 3.4–5.3)
PR INTERVAL - MUSE: 144 MS
PROT SERPL-MCNC: 6.3 G/DL (ref 6.4–8.3)
QRS DURATION - MUSE: 86 MS
QT - MUSE: 344 MS
QTC - MUSE: 432 MS
R AXIS - MUSE: 16 DEGREES
RBC # BLD AUTO: 4.02 10E6/UL (ref 3.8–5.2)
RSV RNA SPEC NAA+PROBE: NEGATIVE
RVA RNA STL QL NAA+NON-PROBE: NEGATIVE
S PNEUM AG SPEC QL: NEGATIVE
SA TARGET DNA: NEGATIVE
SALMONELLA SP RPOD STL QL NAA+PROBE: NEGATIVE
SAPO I+II+IV+V RNA STL QL NAA+NON-PROBE: NEGATIVE
SARS-COV-2 RNA RESP QL NAA+PROBE: NEGATIVE
SHIGELLA SP+EIEC IPAH ST NAA+NON-PROBE: NEGATIVE
SODIUM SERPL-SCNC: 133 MMOL/L (ref 135–145)
SODIUM SERPL-SCNC: 137 MMOL/L (ref 135–145)
SODIUM UR-SCNC: <20 MMOL/L
SYSTOLIC BLOOD PRESSURE - MUSE: NORMAL MMHG
T AXIS - MUSE: 43 DEGREES
V CHOLERAE DNA SPEC QL NAA+PROBE: NEGATIVE
VENTRICULAR RATE- MUSE: 95 BPM
VIBRIO DNA SPEC NAA+PROBE: NEGATIVE
WBC # BLD AUTO: 6.3 10E3/UL (ref 4–11)
Y ENTEROCOL DNA STL QL NAA+PROBE: NEGATIVE

## 2023-10-31 PROCEDURE — 99232 SBSQ HOSP IP/OBS MODERATE 35: CPT | Performed by: STUDENT IN AN ORGANIZED HEALTH CARE EDUCATION/TRAINING PROGRAM

## 2023-10-31 PROCEDURE — 36415 COLL VENOUS BLD VENIPUNCTURE: CPT

## 2023-10-31 PROCEDURE — 86140 C-REACTIVE PROTEIN: CPT

## 2023-10-31 PROCEDURE — 250N000011 HC RX IP 250 OP 636

## 2023-10-31 PROCEDURE — C9113 INJ PANTOPRAZOLE SODIUM, VIA: HCPCS | Mod: JZ

## 2023-10-31 PROCEDURE — 86381 MITOCHONDRIAL ANTIBODY EACH: CPT

## 2023-10-31 PROCEDURE — 999N000157 HC STATISTIC RCP TIME EA 10 MIN

## 2023-10-31 PROCEDURE — 87899 AGENT NOS ASSAY W/OPTIC: CPT

## 2023-10-31 PROCEDURE — 999N000248 HC STATISTIC IV INSERT WITH US BY RN

## 2023-10-31 PROCEDURE — 83993 ASSAY FOR CALPROTECTIN FECAL: CPT

## 2023-10-31 PROCEDURE — 87493 C DIFF AMPLIFIED PROBE: CPT

## 2023-10-31 PROCEDURE — 84295 ASSAY OF SERUM SODIUM: CPT

## 2023-10-31 PROCEDURE — 250N000013 HC RX MED GY IP 250 OP 250 PS 637

## 2023-10-31 PROCEDURE — 80053 COMPREHEN METABOLIC PANEL: CPT

## 2023-10-31 PROCEDURE — 250N000012 HC RX MED GY IP 250 OP 636 PS 637

## 2023-10-31 PROCEDURE — 84300 ASSAY OF URINE SODIUM: CPT

## 2023-10-31 PROCEDURE — 82962 GLUCOSE BLOOD TEST: CPT

## 2023-10-31 PROCEDURE — 250N000011 HC RX IP 250 OP 636: Mod: JZ | Performed by: STUDENT IN AN ORGANIZED HEALTH CARE EDUCATION/TRAINING PROGRAM

## 2023-10-31 PROCEDURE — 99207 PR NON-BILLABLE SERV PER CHARTING: CPT

## 2023-10-31 PROCEDURE — 87641 MR-STAPH DNA AMP PROBE: CPT

## 2023-10-31 PROCEDURE — 87637 SARSCOV2&INF A&B&RSV AMP PRB: CPT

## 2023-10-31 PROCEDURE — 250N000011 HC RX IP 250 OP 636: Mod: JZ

## 2023-10-31 PROCEDURE — 99222 1ST HOSP IP/OBS MODERATE 55: CPT | Performed by: PSYCHIATRY & NEUROLOGY

## 2023-10-31 PROCEDURE — 258N000003 HC RX IP 258 OP 636: Performed by: FAMILY MEDICINE

## 2023-10-31 PROCEDURE — 120N000002 HC R&B MED SURG/OB UMMC

## 2023-10-31 PROCEDURE — 85025 COMPLETE CBC W/AUTO DIFF WBC: CPT

## 2023-10-31 RX ORDER — ENOXAPARIN SODIUM 100 MG/ML
40 INJECTION SUBCUTANEOUS EVERY 24 HOURS
Status: DISCONTINUED | OUTPATIENT
Start: 2023-10-31 | End: 2023-11-08 | Stop reason: HOSPADM

## 2023-10-31 RX ORDER — HYDROMORPHONE HYDROCHLORIDE 1 MG/ML
0.3 INJECTION, SOLUTION INTRAMUSCULAR; INTRAVENOUS; SUBCUTANEOUS ONCE
Status: COMPLETED | OUTPATIENT
Start: 2023-10-31 | End: 2023-10-31

## 2023-10-31 RX ORDER — SALIVA STIMULANT COMB. NO.3
2 SPRAY, NON-AEROSOL (ML) MUCOUS MEMBRANE 4 TIMES DAILY
Status: DISCONTINUED | OUTPATIENT
Start: 2023-10-31 | End: 2023-11-08 | Stop reason: HOSPADM

## 2023-10-31 RX ORDER — LIDOCAINE HYDROCHLORIDE 20 MG/ML
5 SOLUTION OROPHARYNGEAL
Status: DISCONTINUED | OUTPATIENT
Start: 2023-10-31 | End: 2023-11-08 | Stop reason: HOSPADM

## 2023-10-31 RX ORDER — ACETAMINOPHEN 500 MG
1000 TABLET ORAL EVERY 6 HOURS PRN
COMMUNITY

## 2023-10-31 RX ORDER — ONDANSETRON 2 MG/ML
4 INJECTION INTRAMUSCULAR; INTRAVENOUS EVERY 6 HOURS PRN
Status: DISCONTINUED | OUTPATIENT
Start: 2023-10-31 | End: 2023-11-04

## 2023-10-31 RX ORDER — OXYCODONE HYDROCHLORIDE 5 MG/1
5 TABLET ORAL EVERY 4 HOURS PRN
Status: DISCONTINUED | OUTPATIENT
Start: 2023-10-31 | End: 2023-11-05

## 2023-10-31 RX ORDER — DIPHENHYDRAMINE HYDROCHLORIDE AND LIDOCAINE HYDROCHLORIDE AND ALUMINUM HYDROXIDE AND MAGNESIUM HYDRO
10 KIT EVERY 6 HOURS PRN
Status: DISCONTINUED | OUTPATIENT
Start: 2023-10-31 | End: 2023-11-08 | Stop reason: HOSPADM

## 2023-10-31 RX ORDER — ENOXAPARIN SODIUM 100 MG/ML
30 INJECTION SUBCUTANEOUS EVERY 24 HOURS
Status: DISCONTINUED | OUTPATIENT
Start: 2023-10-31 | End: 2023-10-31

## 2023-10-31 RX ADMIN — ONDANSETRON 4 MG: 2 INJECTION INTRAMUSCULAR; INTRAVENOUS at 09:45

## 2023-10-31 RX ADMIN — PIPERACILLIN AND TAZOBACTAM 4.5 G: 4; .5 INJECTION, POWDER, FOR SOLUTION INTRAVENOUS at 00:55

## 2023-10-31 RX ADMIN — Medication 2 SPRAY: at 15:31

## 2023-10-31 RX ADMIN — Medication 2 SPRAY: at 19:32

## 2023-10-31 RX ADMIN — OXYCODONE HYDROCHLORIDE 5 MG: 5 TABLET ORAL at 21:00

## 2023-10-31 RX ADMIN — OXYCODONE HYDROCHLORIDE 5 MG: 5 TABLET ORAL at 17:18

## 2023-10-31 RX ADMIN — HYDROMORPHONE HYDROCHLORIDE 0.5 MG: 1 INJECTION, SOLUTION INTRAMUSCULAR; INTRAVENOUS; SUBCUTANEOUS at 09:45

## 2023-10-31 RX ADMIN — DIPHENHYDRAMINE HYDROCHLORIDE AND LIDOCAINE HYDROCHLORIDE AND ALUMINUM HYDROXIDE AND MAGNESIUM HYDRO 10 ML: KIT at 21:01

## 2023-10-31 RX ADMIN — INSULIN ASPART 1 UNITS: 100 INJECTION, SOLUTION INTRAVENOUS; SUBCUTANEOUS at 17:18

## 2023-10-31 RX ADMIN — SODIUM CHLORIDE, POTASSIUM CHLORIDE, SODIUM LACTATE AND CALCIUM CHLORIDE 1000 ML: 600; 310; 30; 20 INJECTION, SOLUTION INTRAVENOUS at 15:36

## 2023-10-31 RX ADMIN — HYDROMORPHONE HYDROCHLORIDE 0.5 MG: 1 INJECTION, SOLUTION INTRAMUSCULAR; INTRAVENOUS; SUBCUTANEOUS at 12:45

## 2023-10-31 RX ADMIN — SODIUM CHLORIDE, POTASSIUM CHLORIDE, SODIUM LACTATE AND CALCIUM CHLORIDE 1000 ML: 600; 310; 30; 20 INJECTION, SOLUTION INTRAVENOUS at 03:25

## 2023-10-31 RX ADMIN — LOPERAMIDE HYDROCHLORIDE 2 MG: 2 CAPSULE ORAL at 21:09

## 2023-10-31 RX ADMIN — VENLAFAXINE 150 MG: 75 TABLET ORAL at 19:36

## 2023-10-31 RX ADMIN — PANTOPRAZOLE SODIUM 40 MG: 40 INJECTION, POWDER, FOR SOLUTION INTRAVENOUS at 08:37

## 2023-10-31 RX ADMIN — PIPERACILLIN AND TAZOBACTAM 4.5 G: 4; .5 INJECTION, POWDER, FOR SOLUTION INTRAVENOUS at 08:40

## 2023-10-31 RX ADMIN — HYDROMORPHONE HYDROCHLORIDE 0.3 MG: 1 INJECTION, SOLUTION INTRAMUSCULAR; INTRAVENOUS; SUBCUTANEOUS at 01:53

## 2023-10-31 RX ADMIN — PANTOPRAZOLE SODIUM 40 MG: 40 INJECTION, POWDER, FOR SOLUTION INTRAVENOUS at 19:30

## 2023-10-31 RX ADMIN — LOPERAMIDE HYDROCHLORIDE 2 MG: 2 CAPSULE ORAL at 08:48

## 2023-10-31 RX ADMIN — LOPERAMIDE HYDROCHLORIDE 2 MG: 2 CAPSULE ORAL at 17:18

## 2023-10-31 RX ADMIN — VENLAFAXINE 150 MG: 75 TABLET ORAL at 08:48

## 2023-10-31 RX ADMIN — PIPERACILLIN AND TAZOBACTAM 4.5 G: 4; .5 INJECTION, POWDER, FOR SOLUTION INTRAVENOUS at 18:30

## 2023-10-31 RX ADMIN — LOPERAMIDE HYDROCHLORIDE 2 MG: 2 CAPSULE ORAL at 12:51

## 2023-10-31 RX ADMIN — ACETAMINOPHEN 650 MG: 325 TABLET, FILM COATED ORAL at 21:00

## 2023-10-31 RX ADMIN — PIPERACILLIN AND TAZOBACTAM 4.5 G: 4; .5 INJECTION, POWDER, FOR SOLUTION INTRAVENOUS at 12:45

## 2023-10-31 RX ADMIN — DOXYCYCLINE 100 MG: 100 INJECTION, POWDER, LYOPHILIZED, FOR SOLUTION INTRAVENOUS at 09:22

## 2023-10-31 RX ADMIN — HYDROMORPHONE HYDROCHLORIDE 0.5 MG: 1 INJECTION, SOLUTION INTRAMUSCULAR; INTRAVENOUS; SUBCUTANEOUS at 15:31

## 2023-10-31 RX ADMIN — ENOXAPARIN SODIUM 40 MG: 40 INJECTION SUBCUTANEOUS at 15:31

## 2023-10-31 RX ADMIN — DIPHENHYDRAMINE HYDROCHLORIDE AND LIDOCAINE HYDROCHLORIDE AND ALUMINUM HYDROXIDE AND MAGNESIUM HYDRO 10 ML: KIT at 15:28

## 2023-10-31 RX ADMIN — DOXYCYCLINE 100 MG: 100 INJECTION, POWDER, LYOPHILIZED, FOR SOLUTION INTRAVENOUS at 19:31

## 2023-10-31 RX ADMIN — ONDANSETRON 4 MG: 2 INJECTION INTRAMUSCULAR; INTRAVENOUS at 01:25

## 2023-10-31 ASSESSMENT — ACTIVITIES OF DAILY LIVING (ADL)
ADLS_ACUITY_SCORE: 37
ADLS_ACUITY_SCORE: 37
DEPENDENT_IADLS:: INDEPENDENT
ADLS_ACUITY_SCORE: 37

## 2023-10-31 NOTE — CONSULTS
Dental Hygiene Consult Service        Doretha Yu MRN# [id number]   YOB: 1975 Age: 48 year old     Date of Admission: 10/30/2023  PCP is Anna Naidu  Date of Service: 10/31/2023           Reason for Consult:   Referring MD & Reason for Visit: I was asked by No admitting provider for patient encounter., to see Doretha Yu for oral assessment, recurrent ulcers.            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 Pt s/p TAC with end ileostomy (laparoscopic) on 6/15/21 with subsequent parastomal hernia now s/p robotic protectectomy, ileostomy takedown, and creation of J-pouch on 9/5/23. 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 Pt presents with fever x 3 days and increased ostomy output x 2 weeks, admitted at recommendation of GI. Pt states that she has had increased ostomy output for the last two weeks. Fever for the last 3 days. Pt states she has had very poor po intake for the last few days. No urinary sx, shortness of breath, cough. No recent travel. Lives with 15 y/o daughter. No recent abx .  Dental and oropharyngeal history is pertinent for cancer history, hospitalization history, recurrent aphthous ulcers.  The patient reports ulcers throughout her mouth, painful. Have recurred in the past when she is sick or stressed.             General Observations   Level of support Patient is fully independent   Patient currently in pain Yes: Location: in areas of ulcerations, upper and lower lip, tongue.    Patient wears dentures No   Current oral care routine - Sees DDS infrequently, normally daily oral care. Not regular since hospitalization.    Patient has oral care products Patient does not  have oral care products   Extraoral assessment   General appearance Symmetrical and Normal TMJ function   Lymph nodes Symmetrical, no abnormalities, non-tender   Oral Health Assessment Tool (OHAT)   Lips 0=Healthy: smooth, pink, moist   Tongue 2=Unhealthy: (ie. patch that is red and/or white, ulcerated, swollen) - coated, dry and shiny. Ulcerations at apex of tongue and left lateral border near molar.    Gums and Tissues 1=Changes: (ie. dry, shiny, rough, red, swollen, one ulcer/sore spot (under dentures)) - dry and shiny, mild marginal inflammation.    Saliva 1=Changes: (ie. dry, sticky tissues, little saliva present, resident thinks they have dry mouth )- sticky saliva, dry.    Natural Teeth Yes/No 1=Changes: (ie. 1-3 decayed or broken teeth/roots or very worn down teeth) - Pt aware of broken filling in upper right molar. Suspicious caries lesion behind upper front tooth. No pain, no evidence of periapical infection, no fistulas.    Dentures Yes/No Patient does not wear dentures   Oral Cleanliness 1=Changes:(ie. food particles/tartar/plaque in 1-2 areas of the mouth or on small area of dentures or halitosis (bad breath)) - Dry sticky plaque on surfaces of teeth, dental calculus present.    Dental Pain 0=Healthy: no behavioural, verbal, or physical signs of dental pain   OHAT Total Score (0-16) 6   Other Dental Concerns Infrequent professional dental care (pt thinks she was seen last year, hasn't been able to get in due to hospitalizations and surgeries.)   Care provided during assessment Oral Assessment, Intraoral photo(s) of ulcerations, and Patient education   Follow up DH assessments required? Yes, DH will continue to follow up with pt during IP course of treatment   Connect with [facility] or [facility] care? No   Oral Care Plan - Brushing 4x/day with soft toothbrush, being gentle around ulcerated areas.   - Order Biotene, to be used after brushing, before bed and PRN when she feels dry.   - Pt recalls on previous  hospitalizations where she had oral ulcers she was given a topical corticosteroid cream (Triamcinolone paste) to help with healing. Discussed with Med Team about looking into this option as well as MMW for comfort.   - Once discharged, trying to get into her family DDS for routine care. Inquire with med team about any antibiotic prophylaxis needed prior to dental cleanings.             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 Pt s/p TAC with end ileostomy (laparoscopic) on 6/15/21 with subsequent parastomal hernia now s/p robotic protectectomy, ileostomy takedown, and creation of J-pouch on 9/5/23. 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 Pt presents with fever x 3 days and increased ostomy output x 2 weeks, admitted at recommendation of GI. Pt states that she has had increased ostomy output for the last two weeks. Fever for the last 3 days. Pt states she has had very poor po intake for the last few days. No urinary sx, shortness of breath, cough. No recent travel. Lives with 15 y/o daughter. No recent abx , oral exam concerning for recurrent aphthous ulcers, infection risk, NVHAP risk.     Plan:   - Order Biotene  - Med team is looking into topical corticosteroid cream for oral ulcers (Triamcinolone paste), order MMW if pt wants to use  - Encourage oral care plan as above to reduce risk for HAIs such as NVHAP.    Jamia Duvall, JANETTE  Pager: [phone]      Past Medical History   I have reviewed this patient's medical history and updated it with pertinent information if needed.  Past Medical History:   Diagnosis Date    Abnormal MRI     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     Anxiety      Basal cell carcinoma     Cervical high risk HPV (human papillomavirus) test positive 2019    See problem list    Colitis     Depression     Diabetes mellitus, iatrogenic (H) 2020    Esophageal reflux     Inflammatory arthritis     Insomnia     Intestinal giardiasis 2018    Lumbago     left lower back pain    Lymphedema     Malignant melanoma (H)     Melanoma (H) 10/23/2017    Migraines     Mild persistent asthma     Morbid obesity with BMI of 40.0-44.9, adult (H)     STORMY (obstructive sleep apnea)     Prothrombin deficiency (H)     takes 81mg asa daily    Stroke (cerebrum) (H)     During     TIA (transient ischemic attack)     Type 2 diabetes mellitus (H)     Ulcerative pancolitis (H)        Past Surgical History   I have reviewed this patient's surgical history and updated it with pertinent information if needed.  Past Surgical History:   Procedure Laterality Date    APPENDECTOMY      COLONOSCOPY N/A 10/18/2017    Procedure: COLONOSCOPY;  Colon;  Surgeon: Debbie Stephens MD;  Location: [facility]    COLONOSCOPY N/A 2018    Procedure: COMBINED COLONOSCOPY, SINGLE OR MULTIPLE BIOPSY/POLYPECTOMY BY BIOPSY;  colon;  Surgeon: Benita Schumacher MD;  Location: [facility]    COLONOSCOPY      multiple since  to present - about 6 total    DAVINCI ASSISTED TRANSANAL TOTAL MESORECTAL EXCISION N/A 2023    Procedure: COMPLETION PROCTECTOMY, ROBOT-ASSISTED, ILEAL POUCH ANASTAMOSIS;  Surgeon: Quinton Ram MD;  Location: [facility] OR    DISSECT LYMPH NODE AXILLA Left 10/23/2017    Procedure: DISSECT LYMPH NODE AXILLA;  Left Axillary Lymph Node Dissection ;  Surgeon: Laurent Cool MD;  Location: [facility]    EXAM UNDER ANESTHESIA PELVIC N/A 2020    Procedure: EXAM UNDER ANESTHESIA, PELVIS; with Cervical Biopsies, Vaginal Biopsy and Endocervical Curettings;  Surgeon: Melina Jung MD;  Location: [facility] OR    GYN SURGERY  ,         LAPAROSCOPIC ASSISTED COLECTOMY N/A  06/15/2021    Procedure: laparoscopic total abdominal colectomy, end ileostomy;  Surgeon: Quinton Ram MD;  Location: [facility]    REPAIR MOHS Left 2017    Procedure: REPAIR MOHS;  Left Upper Lid Moh's Reconstruction;  Surgeon: Kisha Bosch MD;  Location: [facility]    SIGMOIDOSCOPY FLEXIBLE N/A 2023    Procedure: Sigmoidoscopy flexible;  Surgeon: Quinton Ram MD;  Location: [facility]       Social History   I have reviewed this patient's social history and updated it with pertinent information if needed.  Social History     Tobacco Use    Smoking status: Former     Packs/day: 1.00     Years: 5.00     Additional pack years: 0.00     Total pack years: 5.00     Types: Cigarettes     Quit date: 3/20/1998     Years since quittin.6    Smokeless tobacco: Never   Vaping Use    Vaping Use: Never used   Substance Use Topics    Alcohol use: Not Currently    Drug use: Yes     Types: Marijuana     Comment: vape, edible     Prior to Admission Medications   Prior to Admission Medications   Prescriptions Last Dose Informant Patient Reported? Taking?   Ostomy Supplies MISC   No No   Si each daily   acetaminophen (TYLENOL) 325 MG tablet   No No   Sig: Take 3 tablets (975 mg) by mouth every 6 hours   albuterol (PROAIR HFA/PROVENTIL HFA/VENTOLIN HFA) 108 (90 Base) MCG/ACT inhaler   No No   Sig: Inhale 2 puffs into the lungs every 4 hours as needed for shortness of breath / dyspnea   diphenoxylate-atropine (LOMOTIL) 2.5-0.025 MG tablet   No No   Sig: Take 1 tablet by mouth 2 times daily   famotidine (PEPCID) 20 MG tablet   No No   Sig: Take 1 tablet (20 mg) by mouth 2 times daily   hydrOXYzine (ATARAX) 25 MG tablet   No No   Sig: Take 1 tablet (25 mg) by mouth 2 times daily   loperamide (IMODIUM A-D) 2 MG tablet   No No   Sig: Take 1 tablet (2 mg) by mouth 4 times daily as needed for other (high ileostomy output)   loperamide (IMODIUM A-D) 2 MG tablet   No No   Sig: Take 2 tabs daily and increase  as needed until output is apple sauce consistency. Max of 8 tabs (16 mg) per day.   medical cannabis (Patient's own supply)   Yes No   Sig: See Admin Instructions (The purpose of this order is to document that the patient reports taking medical cannabis.  This is not a prescription, and is not used to certify that the patient has a qualifying medical condition.)   menthol (ICY HOT) 5 % PTCH   No No   Sig: Apply 1 patch topically every 8 hours as needed for muscle soreness   menthol-zinc oxide (CALMOSEPTINE) 0.44-20.6 % OINT ointment   No No   Sig: Apply topically 4 times daily as needed for skin protection   metFORMIN (GLUCOPHAGE) 500 MG tablet   No No   Sig: TAKE 2 TABLETS BY MOUTH 2 TIMES DAILY WITH MEALS   Patient taking differently: Take 500 mg by mouth 2 times daily (with meals) TAKE 2 TABLETS BY MOUTH 2 TIMES DAILY WITH MEALS   methocarbamol (ROBAXIN) 750 MG tablet   No No   Sig: Take 1 tablet (750 mg) by mouth 4 times daily as needed for muscle spasms   ondansetron (ZOFRAN ODT) 4 MG ODT tab   No No   Sig: Take 1 tablet (4 mg) by mouth every 8 hours as needed for nausea or vomiting   Patient taking differently: Take 4 mg by mouth as needed for nausea or vomiting   ondansetron (ZOFRAN ODT) 4 MG ODT tab   No No   Sig: Take 1 tablet (4 mg) by mouth every 8 hours as needed for nausea   oxyCODONE (ROXICODONE) 5 MG tablet   No No   Sig: Take 1-2 tablets (5-10 mg) by mouth every 6 hours as needed for severe pain   phentermine (ADIPEX-P) 15 MG capsule   Yes No   Sig: Take 15 mg by mouth every morning   tolterodine ER (DETROL LA) 2 MG 24 hr capsule   No No   Sig: Take 1-2 capsules (2-4 mg) by mouth daily   venlafaxine (EFFEXOR) 50 MG tablet   No No   Sig: Take 1 tablet (50 mg) by mouth 2 times daily Total of 150 mg twice daily   venlafaxine (EFFEXOR) 75 MG tablet   No No   Sig: TAKE TWO TABLETS BY MOUTH TWICE A DAY      Facility-Administered Medications: None     Allergies   Allergies   Allergen Reactions    Bee Venom  Swelling    Azithromycin Diarrhea    Erythromycin      Other reaction(s): GI intolerance, Vomiting    Fentanyl Other (See Comments)     sweating  sweating    Prochlorperazine Fatigue     Other reaction(s): Other (see comments)  Fatigue    Buspirone      Other reaction(s): GI intolerance  vomiting    Erythrocin Nausea and Vomiting    Gluten Meal      Celiac disease    Topamax [Topiramate]      Made her lethargic    Zithromax [Azithromycin Dihydrate] Diarrhea    Enbrel [Etanercept] Hives and Rash     Physical Exam

## 2023-10-31 NOTE — PROGRESS NOTES
"Team notified-    \"pt pain unmanaged, can you increase or change IV med orders? PO ineffective due to pouch. thanks.\"    " no

## 2023-10-31 NOTE — CONSULTS
Colon and Rectal Surgery Consultation Note  [facility]    Doretha Yu MRN# [id number]   Age: 48 year old YOB: 1975     Date of Admission:  10/30/2023    Reason for consult: High ileostomy output       Requesting physician: Dr. Renner       Level of consult: Consult and follow for daily recommendations           Assessment:   48 year old female, PMH of UC (possibly 2/2 immunotherapy colitis from history of melanoma) s/p TAC w/ EI in 6/2021 with parastomal hernia, followed by robotic proctectomy, end ileostomy take down, formation of j-pouch and diverting loop ileostomy in 9/2023.  Pt has had a tough post-op course and has had high ileostomy outputs requiring outpatient IV fluids.  Now admitted on 10/30 with 3-4 days of fever, increased ileostomy outputs and continued J-pouch drainage.           Recommendations:     - greatly appreciate Medicine and GI management  - agree with pan-infectious work up.  Also agree with infectious work up from both ileostomy and per anus.   - strict in and outs of all ileostomy output, J-pouch output, urine  - Fluid resuscitate. Will need at least 24 hour ileostomy output trends prior to making fluid and medication recommendations.   - recommend [facility] for ostomy supplies.     - recommend dietician consult for nutritional status evaluation as pt reports significant weight loss  - will further discuss with attending  - is scheduled for ileostomy take down with Dr. Ram in Dec.           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 Followed by robotic proctectomy, ileostomy takedown, and creation of ileal J-pouch and diverting loop ileostomy in 9/2023.   Postoperatively has since developed high ileostomy outputs and has required outpatient IV fluids. Now admitted 10/30 for fevers, increased ileostomy outputs,     Pt reports she has had high outputs since surgery.  However her ileostomy outputs has seemingly further increased about 2 weeks ago.  Pt has been working with clinic to try to titrate a bowel slowing regimen.  Pt reports greater than 2000mL of ileostomy output yesterday.  Pt reports LUQ pain that radiates.  Pt reports that she has started a bowel slowing regimen however the medications run through her.  Pt reports that she takes Imodium about 6 tabs total in a day.  She started one capsule of fiber daily last week.  There was discussion of starting a third medication but then she was instructed to stop.  Pt has not been eating well.  Pt endorses about a 30 pound weight loss since surgery and possibly 6 pounds down in the last 4 days.  Pt not eating well as anything she eats - it causes high ileostomy output.  Her ostomy bag blew off twice on Sunday.  Pt now eats maybe one can of soup per day.  Does not think she ate anything all weekend.  Pt does endorse nausea all the time.  But denies abdominal pain and denies abdominal pain with eating.  Pt is on a gluten free diet due to celiac disease.  Has tried to eat some potatoes to thicken up her output but it does not seem to work.  Pt reports a lot of passing liquid from her J-pouch.  Pt is maintaining continence.  Pt describes high volume, liquid per anus - changes color - clear, to light brown and hero, to green to mucus.  Pt denies any vaginal discharge, although at times, pt reports that she cannot always tell where things are coming from as both urine and anal liquid all  evacuate at the same time.  Pt is exhausted.  Started receiving IV fluids as an outpatient and is supposed to get IVF three times per week.  Pt does not report any improvement with fluids.      About 3-4 days ago started having fevers.  Woke up this morning with a cough and is starting to loose her voice.  Pt also having headaches which makes her very worried, as she had developed headaches the preceding months prior to diagnosis of melanoma.  After melanoma was treated her headaches went away.  And now headaches are back which is understandably concerning for her.              Past Medical History:     Past Medical History:   Diagnosis Date    Abnormal MRI     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     Anxiety     Basal cell carcinoma     Cervical high risk HPV (human papillomavirus) test positive 2019    See problem list    Colitis     Depression     Diabetes mellitus, iatrogenic (H) 2020    Esophageal reflux     Inflammatory arthritis     Insomnia     Intestinal giardiasis 2018    Lumbago     left lower back pain    Lymphedema     Malignant melanoma (H)     Melanoma (H) 10/23/2017    Migraines     Mild persistent asthma     Morbid obesity with BMI of 40.0-44.9, adult (H)     STORMY (obstructive sleep apnea)     Prothrombin deficiency (H)     takes 81mg asa daily    Stroke (cerebrum) (H)     During     TIA (transient ischemic attack)     Type 2 diabetes mellitus (H)     Ulcerative pancolitis (H)              Past Surgical History:     Past Surgical History:   Procedure Laterality Date    APPENDECTOMY      COLONOSCOPY N/A 10/18/2017    Procedure: COLONOSCOPY;  Colon;  Surgeon: Debbie Stephens MD;  [facility]    COLONOSCOPY N/A 2018    Procedure: COMBINED COLONOSCOPY, SINGLE OR MULTIPLE BIOPSY/POLYPECTOMY BY BIOPSY;  colon;  Surgeon: Benita Schumacher MD;  Location: [facility]    COLONOSCOPY      multiple since  to present - about 6 total    DAVINCI  ASSISTED TRANSANAL TOTAL MESORECTAL EXCISION N/A 2023    Procedure: COMPLETION PROCTECTOMY, ROBOT-ASSISTED, ILEAL POUCH ANASTAMOSIS;  Surgeon: Quinton Ram MD;  Location: [facility]    DISSECT LYMPH NODE AXILLA Left 10/23/2017    Procedure: DISSECT LYMPH NODE AXILLA;  Left Axillary Lymph Node Dissection ;  Surgeon: Laurent Cool MD;  Location: [facility]    EXAM UNDER ANESTHESIA PELVIC N/A 2020    Procedure: EXAM UNDER ANESTHESIA, PELVIS; with Cervical Biopsies, Vaginal Biopsy and Endocervical Curettings;  Surgeon: Melina Jung MD;  Location: [facility]    GYN SURGERY  ,         LAPAROSCOPIC ASSISTED COLECTOMY N/A 06/15/2021    Procedure: laparoscopic total abdominal colectomy, end ileostomy;  Surgeon: Quinton Ram MD;  Location: [facility]    REPAIR MOHS Left 2017    Procedure: REPAIR MOHS;  Left Upper Lid Moh's Reconstruction;  Surgeon: Kisha Bosch MD;  [facility]    SIGMOIDOSCOPY FLEXIBLE N/A 2023    Procedure: Sigmoidoscopy flexible;  Surgeon: Quinton Ram MD;  Location: [facility]             Social History:     Social History     Socioeconomic History    Marital status:      Spouse name: Not on file    Number of children: Not on file    Years of education: Not on file    Highest education level: Not on file   Occupational History    Not on file   Tobacco Use    Smoking status: Former     Packs/day: 1.00     Years: 5.00     Additional pack years: 0.00     Total pack years: 5.00     Types: Cigarettes     Quit date: 3/20/1998     Years since quittin.6    Smokeless tobacco: Never   Vaping Use    Vaping Use: Never used   Substance and Sexual Activity    Alcohol use: Not Currently    Drug use: Yes     Types: Marijuana     Comment: vape, edible    Sexual activity: Not Currently     Partners: Male     Birth control/protection: Surgical   Other Topics Concern    Parent/sibling w/ CABG, MI or angioplasty before 65F 55M? No   Social  "History Narrative    19 y.o- patient's mother   of lung cancer. She had to take care of her younger sister.    2012- patient's  had a heart attack with stents placed, followed by cardiac rehabilitation    2000 TO 2012  was in [facility] for depression    2013 patient's  went through alcohol rehabilitation at [facility]            They have attended couple counseling a couple of times and patient went to the family program for chemical dependency.    Patient denies alcohol or drug use and herself            Has 2 children, girls ages 17 and 14. Oldest has been a \"trouble maker.\"     For a while she was working 3 jobs since her  was ill. 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 Reports her job is very stressful.         Social Determinants of Health     Financial Resource Strain: High Risk (2021)    Overall Financial Resource Strain (CARDIA)     Difficulty of Paying Living Expenses: Very hard   Food Insecurity: No Food Insecurity (2021)    Hunger Vital Sign     Worried About Running Out of Food in the Last Year: Never true     Ran Out of Food in the Last Year: Never true   Transportation Needs: No Transportation Needs (2021)    PRAPARE - Transportation     Lack of Transportation (Medical): No     Lack of Transportation (Non-Medical): No   Physical Activity: Not on file   Stress: Stress Concern Present (2021)    [employer] - Occupational Stress Questionnaire     Feeling of Stress : Very much   Social Connections: Unknown (2021)    Social Connection and Isolation Panel [NHANES]     Frequency of Communication with Friends and Family: Not asked     Frequency of Social Gatherings with Friends and Family: Not asked     Attends [employer] Services: Not asked     Active Member of Clubs or Organizations: Not asked     Attends Club or Organization Meetings: Not asked "     Marital Status:    Interpersonal Safety: Unknown (6/25/2021)    Humiliation, Afraid, Rape, and Kick questionnaire     Fear of Current or Ex-Partner: Not asked     Emotionally Abused: Not asked     Physically Abused: Not asked     Sexually Abused: Not asked   Housing Stability: Not on file             Family History:     Family History   Problem Relation Age of Onset    Cancer Mother 45        lung    Neurologic Disorder Mother         epilepsy    Lipids Father     Gastrointestinal Disease Father         diverticulitis     Depression Father     Colitis Father     Colon Cancer Father     Diverticulitis Father     Depression Sister     Cancer Maternal Grandmother     Blood Disease Maternal Grandmother         lymphoma     Arthritis Maternal Grandmother     Diabetes Maternal Grandmother     Depression Maternal Grandmother     Macular Degeneration Maternal Grandmother     Glaucoma Maternal Grandmother     Diabetes Maternal Grandfather     Cerebrovascular Disease Maternal Grandfather     Blood Disease Maternal Grandfather     Heart Disease Maternal Grandfather     Glaucoma Maternal Grandfather     Cancer Paternal Grandmother     Cancer - colorectal Paternal Grandmother     Colitis Paternal Grandmother     Colon Cancer Paternal Grandmother     Diverticulitis Paternal Grandmother     Respiratory Paternal Grandfather         emphysema     Colitis Paternal Grandfather     Colon Cancer Paternal Grandfather     Diverticulitis Paternal Grandfather     Heart Disease Daughter     Asthma Daughter     Melanoma No family hx of     Anesthesia Reaction No family hx of     Clotting Disorder No family hx of              Allergies:      Allergies   Allergen Reactions    Bee Venom Swelling    Azithromycin Diarrhea    Erythromycin      Other reaction(s): GI intolerance, Vomiting    Fentanyl Other (See Comments)     sweating  sweating    Prochlorperazine Fatigue     Other reaction(s): Other (see comments)  Fatigue    Buspirone       Other reaction(s): GI intolerance  vomiting    Erythrocin Nausea and Vomiting    Gluten Meal      Celiac disease    Topamax [Topiramate]      Made her lethargic    Zithromax [Azithromycin Dihydrate] Diarrhea    Enbrel [Etanercept] Hives and Rash             Medications:   lidocaine 1 % 9 mL  sodium bicarbonate 8.4 % injection 1 mEq    acetaminophen (TYLENOL) 325 MG tablet, Take 3 tablets (975 mg) by mouth every 6 hours  albuterol (PROAIR HFA/PROVENTIL HFA/VENTOLIN HFA) 108 (90 Base) MCG/ACT inhaler, Inhale 2 puffs into the lungs every 4 hours as needed for shortness of breath / dyspnea  diphenoxylate-atropine (LOMOTIL) 2.5-0.025 MG tablet, Take 1 tablet by mouth 2 times daily  famotidine (PEPCID) 20 MG tablet, Take 1 tablet (20 mg) by mouth 2 times daily  hydrOXYzine (ATARAX) 25 MG tablet, Take 1 tablet (25 mg) by mouth 2 times daily  loperamide (IMODIUM A-D) 2 MG tablet, Take 2 tabs daily and increase as needed until output is apple sauce consistency. Max of 8 tabs (16 mg) per day.  loperamide (IMODIUM A-D) 2 MG tablet, Take 1 tablet (2 mg) by mouth 4 times daily as needed for other (high ileostomy output)  medical cannabis (Patient's own supply), See Admin Instructions (The purpose of this order is to document that the patient reports taking medical cannabis.  This is not a prescription, and is not used to certify that the patient has a qualifying medical condition.)  menthol (ICY HOT) 5 % PTCH, Apply 1 patch topically every 8 hours as needed for muscle soreness  menthol-zinc oxide (CALMOSEPTINE) 0.44-20.6 % OINT ointment, Apply topically 4 times daily as needed for skin protection  metFORMIN (GLUCOPHAGE) 500 MG tablet, TAKE 2 TABLETS BY MOUTH 2 TIMES DAILY WITH MEALS (Patient taking differently: Take 500 mg by mouth 2 times daily (with meals) TAKE 2 TABLETS BY MOUTH 2 TIMES DAILY WITH MEALS)  methocarbamol (ROBAXIN) 750 MG tablet, Take 1 tablet (750 mg) by mouth 4 times daily as needed for muscle  spasms  ondansetron (ZOFRAN ODT) 4 MG ODT tab, Take 1 tablet (4 mg) by mouth every 8 hours as needed for nausea  ondansetron (ZOFRAN ODT) 4 MG ODT tab, Take 1 tablet (4 mg) by mouth every 8 hours as needed for nausea or vomiting (Patient taking differently: Take 4 mg by mouth as needed for nausea or vomiting)  Ostomy Supplies MISC, 20 each daily  oxyCODONE (ROXICODONE) 5 MG tablet, Take 1-2 tablets (5-10 mg) by mouth every 6 hours as needed for severe pain  phentermine (ADIPEX-P) 15 MG capsule, Take 15 mg by mouth every morning  tolterodine ER (DETROL LA) 2 MG 24 hr capsule, Take 1-2 capsules (2-4 mg) by mouth daily  venlafaxine (EFFEXOR) 50 MG tablet, Take 1 tablet (50 mg) by mouth 2 times daily Total of 150 mg twice daily  venlafaxine (EFFEXOR) 75 MG tablet, TAKE TWO TABLETS BY MOUTH TWICE A DAY              Review of Systems:      All other review of systems negative, except for what is mentioned above        Physical Exam:   /72   Pulse 89   Temp 98.9  F (37.2  C) (Oral)   Resp 18   SpO2 95%   General: Alert, interactive, tired appearing  Resp: normal respiratory effort, hoarse voice/dysphonia, intermittent dry cough  Cardiac: RRR, NS1,S2  Abdomen: Soft, minimally tender, nondistended. no rebound or guarding.  Incisions healing well  Ostomy - pink and viable with liquid green output.   External perineal exam:  no obviously erythema, fluctuance  Extremities: No LE edema or obvious joint abnormalities  Skin: Warm and dry, no jaundice or rash  Neuro: A&Ox3, CN 2-12 intact, PATRICE Sanabria PA-C ..................10/31/2023   12:37 PM  Colon and Rectal Surgery      The above plan of care was performed and communicated to me by CRS Staff: Dr. Quinton Rma     I spent 45 minute face-to-face or coordinating care of Doretha Yu. Over 50% of our time on the unit was spent counseling the patient and/or coordinating care as documented in the assessment and plan.

## 2023-10-31 NOTE — CONSULTS
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      # Ulcerative Proctosigmoiditis  # S/p laparoscopic total abdominal colectomy with end ileostomy 6/15/2021  # S/p laparoscopic robotic-assisted completion proctectomy, takedown of end ileostomy, creation of ileal J-pouch with ileoanal anastomosis, and diverting loop ileostomy creation on 9/5/2023  # Acute on Chronic Nausea   # Subacute Loose Stools  # Subacute Abdominal Pain  # Acute Fevers reported PTA  # Acute Hoarseness  # Acute Cough  # Malaise   Reports increased ostomy and rectal output since  surgery 9/5/2023 that has worsened the last 2 to 3 weeks described as loose-watery brown, non-bloody stools with associated nausea with dry heaving, overall fatigue/malaise, recent headaches and presented with 3 day history of reported fevers PTA. Today, patient has a hoarse voice and productive cough with sharp pain reported under her left lower ribs that radiates into her left chest. Reports PTA vapes Medical Marijuana for her acute on chronic nausea but has not recently due to overall feeling ill.     Patient with history of nivolumab-induced ulcerative proctosigmoiditis status-post laparoscopic total abdominal colectomy with end ileostomy (6/15/2021) s/p laparoscopic robotic-assisted completion proctectomy, takedown of end ileostomy, creation of ileal J-pouch with ileoanal anastomosis, and diverting loop ileostomy creation on 9/5/2023 with Dr. Ram. Surgical pathology consistent with mildly active chronic proctitis. Patient is scheduled for upcoming Ileostomy closure on 12/27/2023 with Dr. Ram.   - Patient is not on IBD medications PTA.     - CT Enterography with Contrast 10/30/2023 reported with surgical changes of total colectomy and completion proctectomy with right lower quadrant diverting ileostomy, no evidence of infectious or inflammatory bowel pathology, no intra-abdominal abscess, new left lower lobe consolidative opacity compatible with pneumonia, trace left pleural effusion with adjacent atelectasis.     - CRP trending down: 133 mg/L on 10/30/2023 --> 105 mg/L on 10/31/2023.    - 10/30/2023: Negative Influenza, RSV and COVID-19.   - Blood cultures with no growth after 12 hours.   - 10/31/2023: Negative C. Difficile Toxin B PCR, Enteric Bacteria and Virus Panel PCR.    Acute Clinical picture seems most consistent with an acute likely viral illness that has evolved to a pneumonia given reported 2-3 week worsening of subacute loose stools, acute on chronic nausea, malaise, new productive  cough and voice hoarseness. Infectious illness likely exacerbating loose stools and contributing to increase ostomy and rectal output. Agree with pneumonia treatment per primary team, keeping in mind that IV antibiotics can lead to gut dysbiosis and contribute to loose stools. Additionally unclear how much small bowel remains and with increased loose stools since recent colorectal surgery with known loop ileostomy, may have a component of bile acid malabsorption contributing to loose stools. If concern for bile acid malabsorption contributing to loose stools (bile acid diarrhea), could consider trial of Cholestyramine. Additionally could consider starting acid suppression with PPI 40 mg BID for 12 weeks then daily during the first six months after small bowel surgery in order to reduce gastric secretion and enteral fluid losses. Would also avoid all artificial sugars from the diet: Aspartame, Sucralose, Acesulfame K, Saccharin, Xylitol. Recommend RD Nutrition evaluation as malnutrition can also contribute to loose stools and overall malaise. Agree with strict documentation and trending of both ostomy and rectal output.     Given recent colorectal surgery, recommend consulting CRS for input regarding increased ostomy and rectal output since surgery and potential options for bulking the stool with planned upcoming surgery December 2023. Per most recent CRS outpatient note 10/10/2023, Dr. Ram recommended Loperamide QID, Lomotil 1 tab BID noting it may need to be titrated up and recommending patient measure and record ostomy output. Subsequent Colorectal Surgery RN MyChart message on 10/25 recommended Imodium 1 tablet twice a day, up to 8 tablets daily and start Fiber 2 tbsp twice daily, maximum of 3 times daily. Will defer post-operative loose stool management (loperamide, Lomotil, fiber, etc.) to Colorectal surgery at this time.         # Elevated Liver Tests  Elevation of Alkaline Phosphatase, ALT and AST on  7/11/2023, within normal limits 8/22/2023, alkaline phosphatase elevation 9/13/2023 and 9/14/2023, then again elevation of Alkaline Phosphatase (246 U/L), ALT (213 U/L) and ALT (159 U/L) on 10/23/2023, now overall continuing to trend down. Patient has history of fatty liver and biliary dyskinesia. Patient reported significant tylenol use recently (4000 mg daily since surgery 9/5/2023). On 10/30/2023: Hepatitis C Antibody non-reactive, Hepatitis B Surface Antigen non-reactive, Hepatitis B Core IgM non-reactive, Hepatitis A IgM Antibody non-reactive. Pending Mitochondrial M2 Antibody IgG. Favor continuing to monitor and trend labs.        Recommendations:  -- VTE prophylaxis.   -- Appreciate infectious work-up and treatment per primary team.   -- Physical therapy consult.     -- Please place Colorectal Surgery Consult for increased ostomy and rectal output since recent colorectal surgery 9/5/2023.   - Defer post-operative loose stool management (loperamide, Lomotil, fiber, etc.) to Colorectal surgery at this time.  - Consider scheduling loperamide QID, maximum 16 mg daily.  - Consider Fiber supplementation: 2 tsp BID (can increased to TID).  - If loperamide (8 pills per day) and fiber still not keeping output <7797-1379 ml per day, would recommend restarting lomotil at 2 tabs, can further titrate.   - If concerned for bile acid malabsorption contributing to loose stools (bile acid diarrhea), could consider trial of Cholestyramine. Additionally could consider starting acid suppression with PPI 40 mg BID for 12 weeks then daily during the first six months after small bowel surgery in order to reduce gastric secretion and enteral fluid losses.    -- RD Nutrition evaluation.   - Patient reports following a Gluten Free diet for possible Celiac Disease. Per chart review, previous duodenal biopsies have been negative biopsies for celiac disease.    - Consider trial of Pedialyte for PO hydration.   - Avoid all artificial  sugars from the diet: Aspartame, Sucralose, Acesulfame K, Saccharin, Xylitol.    -- Strict Documentation of both Ostomy and Rectal Output.   - Appreciate detailed documentation of stool appearance, including color, consistency, frequency and amount.    - Can smear stool thinly on white paper towel to distinguish color.     -- Health Psychology consult for healthy coping strategies. Patient is amenable.    -- Psychiatry consult for Mental Health medical management optimization. Patient is amenable.   - Patient reports Brand Name Only Effexor has been the only medication that has helped but that her mental health is not optimized at this time.     -- Analgesia/Antiemetics per primary team.      Outpatient:  -- Patient to call to schedule 4 week follow-up appointment in [facility] per recommendation by Beti Issa PA-C on 10/30/2023.  - [facility] phone: [phone], option 1 for clinic scheduling.       COVID status: negative 10/31/2023.     The inpatient gastroenterology service will sign off at this time. Thank you for allowing us to participate in the care of this patient. Please do not hesitate to page the GI service with any questions or concerns.     The patient was discussed and plan agreed upon with Dr. Marco Antonio Bravo, GI Luminal Service staff physician.    Overall time spent on the date of this encounter preparing to see the patient (including chart review of available notes, clinical status events, imaging and labs); discussing, ordering, coordinating recommended medications, tests or procedures; communicating with other health care professionals; and documenting the above clinical information in the electronic medical record was 90 minutes.    Chantelle Quintanilla PA-C  Inpatient Gastroenterology Service  [facility]  Text Page         History of Present Illness:   Patient seen and examined at 0900. History is obtained from patient and chart  review.    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      Reports increased rectal and ostomy output since surgery 9/5/2023. Reports over the last 2-3 weeks, output has worsened with the last couple days between 0039-8253 mL ostomy output in addition to rectal output every time she urinates, brown liquid-watery, non-bloody. Reports she just hasn't felt good with fatigue, malaise.     Reports nausea since surgery also worsened the last 2-3 weeks with dry heaving.     Reports left sided sharp pain under her left ribs radiating into her left chest and right sided aching abdominal pain. Reports taking 4000 mg of Tylenol daily (1000 mg every 6 hours) until yesterday morning after Beti Issa PA-C requested she discontinue the tylenol.    Reports recent headache that she feels is worsening.     Since Friday, reports having fevers.     Woke up this  morning with hoarse voice and cough, coughing up thick green mucous.    Reports following a gluten free diet for history of possible celiac disease.     Vapes Medical Marijuana for her chronic nausea.     Reports having a difficult time with her mental health. Report brand name Effexor is the only thing that has worked but thinks this is not well-controlled on her PTA Effexor. Reports she has a degree in Psychology and states her mental health is suffering right now. Amenable to speaking with Psychiatry and Health Psychology.       IBD HISTORY  Age at diagnosis: 2021  Extent of disease: proctosigmoid   Current UC medications: None  Prior UC surgeries:   -- 6/15/2021: total abdominal colectomy and end ileostomy  -- 9/5/2023: laparoscopic robotic-assisted completion proctectomy, takedown of end ileostomy, creation of ileal J-pouch with ileoanal anastomosis, flexible pouchoscopy/sigmoidoscopy and diverting loop ileostomy creation    Prior IBD Medications:   -- Infliximab, 1 dose in the hospital good response  -- Vedolizumab, completed induction dosing  -- Prednisone, minimal response to high-dose oral and IV with significant weight gain  -- 5-ASA, no response  -- Topical therapies to include enemas, no response         Per 10/30/2023 Virtual Visit with Beti Issa PA-C in [facility]:  Recommend patient go to ED for evaluation and treatment.  Consider following for GI work-up with additional labs and studies as appropriate:  -- Repeat labs for hydration and infection  -- Enteric panel  -- Repeat C.Diff   -- CT enterography for further assessment of bowel, infection/abscess or other intraabdominal pathology     To assist with ostomy output, recommend:  -- Increase Loperamide to 2 tabs 4 times per day  -- Restart fiber 2 tsp twice per day (can increase to 3 times per day)  --If loperamide (8 pills per day) and fiber still not keeping output <1823-8485 ml per day, would recommend restarting lomotil at 2 tabs, can  further titrate  -- Avoid high sugar foods and drinks and anything with sugar alcohols, for hydration try Pedialyte instead.   -- Nutrition referral for ostomy diet and hydration        Previous Procedures:    9/5/2023 - Colorectal Surgery Operative Note - Dr. Quinton Ram  PREOPERATIVE DIAGNOSIS:  Chronic ulcerative pancolitis refractory to medical therapy (status post total abdominal colectomy with end ileostomy).     POSTOPERATIVE DIAGNOSIS:  Chronic ulcerative pancolitis refractory to medical therapy (status post total abdominal colectomy with end ileostomy).      ANESTHESIA:  General endotracheal anesthesia plus bilateral TAP blocks.     PROCEDURES PERFORMED:    1.  Laparoscopic robotic-assisted completion proctectomy.  2.  Takedown of end ileostomy  3.  Creation of ileal J-pouch with ileoanal anastomosis   4.  Flexible pouchoscopy/sigmoidoscopy.  5.  Diverting loop ileostomy creation     Final Diagnosis   A. ILEUM, ILEOSTOMY TRIM;  Ileo-cutaneous anastomotic junction with nonspecific mucosa inflammation, congestion, and other features consistent with ileostomy; no dysplasia or malignancy  B. RECTUM, COMPLETION PROCTECTOMY:  Mildly active chronic proctitis with:   -Neutrophilic cryptitis, mucosal atrophy and prominent reactive lymphoid hyperplasias   -Reactive mesorectal lymph nodes;  no dysplasia or malignancy     C. COLON, ANASTOMOTIC RINGS; EXCISION:   -Portions of small intestinal mucosa and wall with no morphologic abnormalities   -Portion of rectal mucosa and wall with chronic proctitis; no dysplasia or malignancy        SURGEON:  Quinton Ram MD, PhD.     ASSISTANT:  Madeline Rodriguez MD, and Gasper Varela MD (Colon and Rectal Surgery fellows)     Bedside assistant: Mariela Crandall PA-C.  Ms Crandall's assistance was required at the bedside for instrument exchanges and well as tissue retraction and suture passing.  This allowed Otoniel Rodriguez and Avery to be at the robotic consoles and receive much greater  "educational/training benefit.     BRIEF HISTORY:  Doretha Yu is a 48-year-old lady who underwent total abdominal colectomy with end ileostomy by me in June 2021 for ulcerative pancolitis.  Thankfully, she recovered very well from this.  She has lost over 60 lbs in the past year and now is a much more appropriate candidate for this elective procedure.  Her ileostomy was functioning well with and she denied any pouching issues.  She denied any previous anorectal operations.  We had a long discussion with regards to a restorative proctocolectomy versus end ileostomy.  She elected to proceed with an ileal J-pouch.  I thoroughly discussed the risks, benefits and alternatives of operative treatment with Doretha, and she agreed to proceed.     DESCRIPTION OF OPERATION:  After obtaining informed consent, the patient was brought to the operating room and placed in the modified lithotomy position.  General endotracheal anesthesia was gently induced without difficulty.  She underwent preoperative placement of bilateral TAP blocks.  She also received appropriate preoperative antibiotic prophylaxis as well as mechanical and chemical DVT prophylaxis.  Bilateral lower extremity pneumatic compression devices were applied, and all pressure points were cushioned.  A Major catheter was inserted.       We performed a time-out and I performed a flexible sigmoidoscopy.  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  It is unclear if there was a component of active ulcerative proctitis as well.     The abdomen was prepped and draped in the standard sterile fashion.  A \"timeout\" was performed.       An 8 mm supra-umbilical incision was made and the Ellen port was placed.  The abdomen was insufflated to 15 mmHg.  The robotic camera was then inserted.  There were few, if any, adhesions.  We were able to place an additional left sided 8 mm port under direct vision.  We placed an additional port (8 mm) on the right  in " addition to an 8 mm AirSeal port in the right upper quadrant.  The mesentery of the small bowel was very mobile and I did not anticipate it causing us issues with reach of the ileal pouch into the pelvis.  We then turned our attention to the end ileostomy.     A circular incision was placed around the ileostomy and this was dissected down into the peritoneal cavity.  After it was completely freed from abdominal wall attachments we brought an ample amount of small bowel out through the ostomy site.  We were able to get this to reach to the pubis, thus further convincing us that IPAA creation was feasible.       A Gel Port was placed in the former ileostomy site. A 12 mm robotic port and an additional assist port were placed through this site.     Pneumoperitoneum was established up to 15 mmHg without difficulty.  The patient was placed in Trendelenburg position.  The [employer] Xi robot was docked, and all robotic instruments were placed under direct vision.  The uterus was large and we sewed it to the anterior abdominal wall with three 0 vicryl sutures to keep it out of the way.  After this, the small bowel was retracted to the upper abdomen and completion proctectomy was performed with circumferential dissection all the way down to the pelvic floor.  Left and right ureters were clearly identified during this dissection and care was taken to not injure them. Once we had reached the pelvic floor, we performed a rectal exam to assure that we were just above the anal canal.  The mesorectum was very inflamed and took about 3 hours to dissect.  I confirmed with digital exam that we were transecting at a reasonable level with just enough (about 1 cm) of rectal cuff remaining for the stapled anastomosis.  We then transected the rectum about 1-2 cm proximal to the an al verge with a robotic stapler (45 mm -- green load).  This was performed with 3 firings of the stapler.  The rectum was placed in a 15 cm endocatch bag.    "  After this the rectum was removed through the former ileostomy site.  It was sent to pathology.  We then brought out the small bowel through the old ileostomy site to creat the J pouch.     We used the 100 mm NEHAL blue load stapler to transect 1 cm or so of the distal ileum and sent this to pathology as \"ileostomy trim\".  We selected a spot to the ileum that was at least 15 cm long for our ileal J-pouch.  Three stay sutures were placed at the proximal portion of the pouch.  An antimesenteric enterotomy was placed and a 15 cm long ileal j-pouch was constructed with one firing of the 100 mm NEHAL blue load and one firing of the endo-NEHAL 60 mm blue load (the laparoscopic stapler).  The entire \"common\" staple line of the J pouch was then oversewn in interrupted Lembert fashion with 3-0 PDS suture.  A 29 mm EEA anvil was then sewn into our enterotomy site and fixed in place with a 2-0 Prolene pursestring suture.  The pouch was then returned to the peritoneal cavity and re-insufflated.       The anvil was then brought all the way down to the rectal stump.  This reached easily without undo tension.  The anus was then gently dilated and a 29 mm EEA stapler was passed transanally.  It was clear that the stapler was passed the anal sphincter complex.  The post was delivered just adjacent to one of the previous robotic staple lines and the anvil was connected to the post.  There was absolutely no evidence of tension, torsion, or inadequate blood supply to the ileoanal anastomosis.  We made sure it wasn't twisted and that it went straight down into the pelvis with no twisting of the mesentery multiple times.  The stapler was then fired and then withdrawn.  Both anastomotic rings were intact and these were sent for permanent pathology.  Flexible pouchoscopy was then performed with a flexible sigmoidoscope and there was no evidence of bleeding inside the ileal J-pouch.  Furthermore, hydropneumatic testing was negative.  The mucosa " of the entire ileal J-pouch appeared to be healthy.  The flexible sigmoidoscope was then removed. The abdomen and pelvis were then irrigated with sterile water and subsequently suctioned out.       A 19-Libyan Ralf drain was left in the pelvis and brought through one of the left sided trocar sites.  The 12 mm right lower quadrant port was closed with 0 vicryl and the PMI suture passer device under direct vision.  We then selected a segment of ileum that was approximately 20-25 cm proximal to the J-pouch for our loop ileostomy.       This did not reach her former ileostomy site, which was in the right upper quadrant.  We closed the former ileostomy site with #1 PDS suture in several figure-of-eight sutures.  We partially closed the skin at this site with 0 vicryl suture.  The site was packed with betadyne soaked kerlix.     The new ileostomy site was chosen in the right lower quadrant.  A circular incision in the skin was made and we dissected down to fascia.  We made a linear incision in anterior fascia and then  out the rectus muscle fibers.  We sharply entered the peritoneal cavity through the posterior fascia.  The small bowel easily came out through this site.     Instrument, sponge, and needle counts were all correct as reported to me.  All instruments and trocars were removed under direct vision.  The right sided 12 mm port site was then also closed with 0 vicryl and the PMI suture passer.  Pneumoperitoneum was desufflated.  The trocar sites were closed at the skin level with 4-0 Monocryl subcuticular sutures and skin glue.  The Ralf drain was fixated to the skin with a 2-0 nylon suture. The segment of ileum that was brought through the prior ileostomy site was oriented correctly and matured as a loop ileostomy with multiple 3-0 Vicryl sutures.  A sterile stoma appliance was then placed.  Instrument, sponge, and needle counts were all correct as reported to me.  The patient tolerated the procedure  well.     COMPLICATIONS:  None immediately.     ESTIMATED BLOOD LOSS:  50 mL.     DRAINS AND TUBES:  A 19-Bulgarian Ralf drain in the pelvis and Major catheter.     SPECIMENS:  Ileostomy trim, rectum, and anastomotic rings     FINDINGS:  A 29 mm EEA double stapled ileoanal anastomosis.  Negative hydropneumatic testing.     DISPOSITION:  PACU.     uQinton Ram MD, PhD      2/14/2023 - EGD - [facility]  Pre-op Diagnoses:        Dyspepsia, Dysphagia, Unexplained chest pain   Findings:       Esophagogastric landmarks were identified: the Z-line was found at 37        cm, the gastroesophageal junction was found at 37 cm and the site of        hiatal narrowing was found at 37 cm from the incisors.        The gastroesophageal flap valve was visualized endoscopically and        classified as Hill Grade IV.        LA Grade A (one or more mucosal breaks less than 5 mm, not extending        between tops of 2 mucosal folds) esophagitis was found at the        gastroesophageal junction.        There is no endoscopic evidence of stricture, findings consistent with        candidiasis or vertical lines/rings in the entire esophagus. Biopsies        were obtained from the proximal and distal esophagus with cold forceps        for histology of suspected eosinophilic esophagitis. Estimated blood        loss was minimal. Verification of patient identification for the        specimen was done using the patient's name and birth date. Estimated        blood loss was minimal.        Localized mildly erythematous mucosa without bleeding was found in the        prepyloric region of the stomach. Biopsies were taken with a cold        forceps for Helicobacter pylori testing. Verification of patient        identification for the specimen was done using the patient's name and        birth date. Estimated blood loss was minimal.        A few diminutive sessile fundic gland polyps were found in the gastric        body.        A single  diminutive angioectasia without bleeding was found in the        second portion of the duodenum (posterior wall).        The exam of the duodenum was otherwise normal.        Biopsies for histology were taken with a cold forceps in the second        portion of the duodenum for evaluation of celiac disease. Verification        of patient identification for the specimen was done using the patient's        name and birth date. Estimated blood loss was minimal.     Impression  Performed by [facility]  Post-op Diagnoses:       - LA Grade A reflux esophagitis.       - Erythematous mucosa in the prepyloric region of the stomach. Biopsied.       - A few fundic gland polyps.       - A single non-bleeding angioectasia in the duodenum (posterior wall).       - Biopsies were taken with a cold forceps for evaluation of celiac       disease.       - No clear cause of abdominal pain identified. No evidence of recurrent       melanoma.    Recommendation:       - Ensure compliance with anti-reflux regimen; if compliant consider        escalation of therapy with alternative PPI.        - Await pathology results.       10/4/2022 - EGD - [facility]  Pre-op Diagnoses:        Abnormal PET scan of the GI tract   Findings:       LA Grade A (one or more mucosal breaks less than 5 mm, not extending        between tops of 2 mucosal folds) esophagitis with no bleeding was found        at the gastroesophageal junction.        Localized mild inflammation was found in the gastric antrum. Biopsies        were taken with a cold forceps for histology. No evidence for recurrent        melanoma.        The examined duodenum was normal.     Impression  Performed by [facility]  Post-op Diagnoses:       - LA Grade A esophagitis with no bleeding.       - Gastritis. Biopsied.       - Normal examined duodenum.    Recommendation:       - Return to referring physician.    __________________________________________________________________      Pathology Consult:   FINAL DIAGNOSIS:  I. CASE FROM [place], [place] ([id number], OBTAINED 05/11/18):  A. COLON, RECTOSIGMOID, ENDOSCOPIC BIOPSY:  - Moderate active chronic colitis with cryptitis, crypt distortion and  erosions  - Negative for dysplasia    II. CASE FROM [place] ([id number], OBTAINED 09/12/18):  A. COLON, TRANSVERSE, ENDOSCOPIC BIOPSY:  - Colonic mucosa with no significant histopathologic abnormality    B. COLON, DESCENDING, SIGMOID, ENDOSCOPIC BIOPSY:  - Colonic mucosa with Paneth cell metaplasia and no significant  inflammation    C. RECTUM, ENDOSCOPIC BIOPSY:  - Colonic mucosa with mild crypt distortion and Paneth cell metaplasia  without significant inflammation    III. CASE FROM [place] ([id number], OBTAINED 02/05/19):  A. COLON, LEFT, ENDOSCOPIC BIOPSY:  - Colonic mucosa with no significant histopathologic abnormality  - No evidence of chronic active or microscopic colitis    B. DUODENUM, SECOND PART, ENDOSCOPIC BIOPSY:  - Duodenal mucosa with no significant histologic abnormality  - No evidence of peptic duodenitis and celiac sprue    C. STOMACH, BODY, AND INCISURA, ENDOSCOPIC BIOPSY  - Gastric mucosa with no significant histopathologic abnormality  - No H. pylori like organisms identified on routine staining  - Negative for intestinal metaplasia or dysplasia    IV. CASE FROM [place] ([id number], OBTAINED 04/22/21):  A. COLON, SIGMOID, RECTUM, ENDOSCOPIC BIOPSY:  - Moderate chronic active colitis with cryptitis, crypt abscess formation  and erosions  - Negative for dysplasia     ___________________________________________________________________    6/15/2021 - Colorectal Surgery - Dr. Quinton Ram     PREOPERATIVE DIAGNOSES:   1.  Ulcerative colitis  2. History of melanoma     POSTOPERATIVE DIAGNOSES:   1.  Ulcerative colitis  2. History of  "melanoma     ANESTHESIA:  General endotracheal anesthesia      PROCEDURE PROPOSED:  1.  Laparoscopic total abdominal colectomy with end ileostomy.     PROCEDURES PERFORMED:   1.  Laparoscopic total abdominal colectomy with end ileostomy.     Surgeon: Dr. Ram     ASSISTANT:  Lowell Gould MD CRS Fellow     HISTORY OF PRESENT ILLNESS:  Ms. Fernandez is a 46-year-old female who has known ulcerative colitis. She also has a history of melanoma. Her disease has not be optimally controlled using her current medication regimen, and she was presented with the options of either biologic medications, or surgery. Given her melanoma, she opted to undergo colectomy, rather than take long-term immunomodulating medications that may precipitate a recurrence of her cancer. A long discussion was had regarding this choice. Also raised was the fact this end ileostomy may be permanent, and that given her body habitus a ileal pouch anal anastomosis may not be feasible.     DESCRIPTION OF PROCEDURE:  After obtaining informed consent, the patient was brought to the operating room and placed in the modified lithotomy position.  General endotracheal anesthesia was gently induced without difficulty.  She also received appropriate preoperative antibiotic prophylaxis as well as mechanical and chemical DVT prophylaxis.  Bilateral lower extremity pneumatic compression devices were applied, and all pressure points were cushioned.  A Major catheter was inserted.  The abdomen was prepped and draped in the standard sterile fashion.  A \"timeout\" was performed.       A veress needle was placed in the left upper quadrant without difficulty and pneumoperitoneum was intiated with CO2 gas. A 5mm Optiview trocar was placed in the LUQ. Five additional ports were placed through out the case, 5mm ports in the RUQ, RLQ, LLQ, and periumbilical region, and a 10mm port at the site of the future ileostomy. These were all placed under direct visualization and " without difficulty.  The patient was then positioned and the small bowel was retracted to the right upper abdomen.  The sigmoid colon was mobilized using a lateral to medial approach. This dissection was then carried up to the splenic flexure as well as to the proximal rectum. We mobilized the omentum off the transverse colon and entered the lesser sac. We carried this dissection to the hepatic flexure and splenic flexure. We then completed our splenic flexure mobilization. Next we mobilized our right colon using a lateral to medial approach. We identified the duodenum without difficulty. The hepatic flexure was quite redundant, but was mobilized with ease.     We then proceed to mobilize our terminal ileum along the mesentery and retroperitoneal interface. After this was mobilized enough to get our stoma to the ostomy location, we divided the terminal ileum using an Endo-NEHAL stapler. A LigaSure was then used to divide the right colon mesentery. We then made a window at our recto-sigmoid junction and divided our colon using another firing of the Endo-NEHAL, and similarly took our left colon mesentery using the LigaSure. With the right colon and left colon mobilized and mesentery divided, we turned our attention to the transverse mesocolon on our already mobilized transverse colon. We proceeded close to the colon itself, using the LigaSure to dived the mesentery and avoid small bowel and small bowel vascular structures.     A circular incision was made in the RUQ and we divided the fascia in a linear fashion. We then expressed our specimen through this incision and followed that with the transected ileum. Our skin incision were closed using a 4-0 Monocryl and skin glue.      An end ileostomy was matured in the RUQ using 3-0 chromic stitches to Nahed the edges. We insured there was no twist as we expressed it through the abdominal wall.      The patient tolerated the procedure well and was transferred to the PACU in  stable condition.     Lowell Gould MD PhD  CRS fellow    Attestation signed by Quinton Ram MD at 7/2/2021  6:23 PM (Updated)     Physician Attestation   I was present for the entire procedure between opening and closing.     Please also add Modifier 22 to this case.  The procedure was more difficult than usual given the patient's morbid obesity (BMI 44, weight 247 lbs) as well as the urgent nature of this case.     Of note, Dr Vlad Flores was required during a portion of the case due to the need for a second qualified assistant (in addition to Dr Gould) to aid in retraction (difficult due to obesity and tortuous colon).     Quinton Ram  Date of Service (when I saw the patient): 06/15/21      FINAL DIAGNOSIS:   COLON, RESECTION:   - Moderate chronic active colitis with crypt abscess formation (Swapna   grade 3)   - Negative for dysplasia and malignancy   - Viable surgical margins   - Two benign lymph nodes (0/2)     ____________________________________________________________________      5/7/2021 - Flexible Sigmoidoscopy - Dr. Eddei Boudreaux  Indications:         Disease activity assessment of left-sided chronic                        ulcerative colitis   Impression:          - Severe (Gunter Score 3) ulcerative colitis. Biopsied for                        UC activity and CMV.                        - Stool aspirate taken for C difficile toxin PCR.     FINAL DIAGNOSIS:  RECTOSIGMOID, BIOPSY:  - Colonic mucosa with moderate active colitis  - Negative for dysplasia and malignancy    COMMENT:  The dominant histologic feature is the acute inflammation which manifests  as cryptitis and numerous crypt  abscesses.  The luminal surface is damaged with loss of mucin production.  There is some crypt dropout and  minimal glandular complexity, however other features of chronicity such as   basal plasmacytosis and  pseudo-pyloric/Paneth cell metaplasia are absent.  Numerous apoptotic  bodies are  present within the crypt  epithelium. While this may represent an active phase of inflammatory bowel   disease other diagnostic  consideration should include infection and medication effect . Special  stains (CMV and GMS) have been ordered  and will be reported in an addendum. Recommend clinical and serologic  correlation       4/22/2021 - Flexible Sigmoidoscopy - [facility]                2/5/2019 - EGD - [facility]  Pre-op Diagnoses:        Diarrhea   Findings:       Esophagogastric landmarks were identified: the Z-line was found at 36        cm, the gastroesophageal junction was found at 36 cm and the site of        hiatal narrowing was found at 38 cm from the incisors.        The examined esophagus was normal.        The cardia and gastric fundus were normal on retroflexion.        The entire examined stomach was normal. Biopsies were taken with a cold        forceps for Helicobacter pylori testing.        The examined duodenum was normal. No fluid noted in duodenal lumen.        Biopsies for histology were taken with a cold forceps for evaluation of        celiac disease.     [facility]  Post-op Diagnoses:       - Esophagogastric landmarks identified. Small hiatal hernia.       - Normal esophagus.       - Normal stomach. Biopsied.       - Normal examined duodenum. Biopsied.    Recommendation:       - Await pathology results.        - Return to referring physician as previously scheduled.       2/5/2019 - Flexible Sigmoidoscopy - [facility]                    Past Medical History:   Reviewed and edited as appropriate  Past Medical History:   Diagnosis Date    Abnormal MRI     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     Anxiety     Basal cell carcinoma     Cervical high risk HPV (human papillomavirus) test positive 12/13/2019    See problem list    Colitis     Depression     Diabetes mellitus, iatrogenic (H) 01/28/2020    Esophageal reflux     Inflammatory arthritis      Insomnia     Intestinal giardiasis 2018    Lumbago     left lower back pain    Lymphedema     Malignant melanoma (H)     Melanoma (H) 10/23/2017    Migraines     Mild persistent asthma     Morbid obesity with BMI of 40.0-44.9, adult (H)     STORMY (obstructive sleep apnea)     Prothrombin deficiency (H)     takes 81mg asa daily    Stroke (cerebrum) (H)     During     TIA (transient ischemic attack)     Type 2 diabetes mellitus (H)     Ulcerative pancolitis (H)             Past Surgical History:   Reviewed and edited as appropriate   Past Surgical History:   Procedure Laterality Date    APPENDECTOMY  2004    COLONOSCOPY N/A 10/18/2017    Procedure: COLONOSCOPY;  Colon;  Surgeon: Debbie Stephens MD;  [facility]: [facility]    COLONOSCOPY N/A 2018    Procedure: COMBINED COLONOSCOPY, SINGLE OR MULTIPLE BIOPSY/POLYPECTOMY BY BIOPSY;  colon;  Surgeon: Benita Schumacher MD;  Location: [facility] GI    COLONOSCOPY      multiple since  to present - about 6 total    DAVINCI ASSISTED TRANSANAL TOTAL MESORECTAL EXCISION N/A 2023    Procedure: COMPLETION PROCTECTOMY, ROBOT-ASSISTED, ILEAL POUCH ANASTAMOSIS;  Surgeon: Quinton Ram MD;  Location: [facility] OR    DISSECT LYMPH NODE AXILLA Left 10/23/2017    Procedure: DISSECT LYMPH NODE AXILLA;  Left Axillary Lymph Node Dissection ;  Surgeon: Laurent Cool MD;  Location: [facility] OR    EXAM UNDER ANESTHESIA PELVIC N/A 2020    Procedure: EXAM UNDER ANESTHESIA, PELVIS; with Cervical Biopsies, Vaginal Biopsy and Endocervical Curettings;  Surgeon: Melina Jung MD;  Location: [facility] OR    GYN SURGERY  ,         LAPAROSCOPIC ASSISTED COLECTOMY N/A 06/15/2021    Procedure: laparoscopic total abdominal colectomy, end ileostomy;  Surgeon: Quinton Ram MD;  Location: [facility] OR    REPAIR MOHS Left 2017    Procedure: REPAIR MOHS;  Left Upper Lid Moh's Reconstruction;  Surgeon: Kisha Bosch MD;  Location: [facility] OR    SIGMOIDOSCOPY  FLEXIBLE N/A 9/5/2023    Procedure: Sigmoidoscopy flexible;  Surgeon: Quinton Ram MD;  Location:  OR              Social History:   The patient lives in [place], [place].     Alcohol: Not currently.   Tobacco: Former cigarette smoker, quit 1998.   Illicit drugs: Vapes Medical Marijuana for chronic nausea.            Family History:   Patient's family history is reviewed today and is non-contributory    Family History   Problem Relation Age of Onset    Cancer Mother 45        lung    Neurologic Disorder Mother         epilepsy    Lipids Father     Gastrointestinal Disease Father         diverticulitis     Depression Father     Colitis Father     Colon Cancer Father     Diverticulitis Father     Depression Sister     Cancer Maternal Grandmother     Blood Disease Maternal Grandmother         lymphoma     Arthritis Maternal Grandmother     Diabetes Maternal Grandmother     Depression Maternal Grandmother     Macular Degeneration Maternal Grandmother     Glaucoma Maternal Grandmother     Diabetes Maternal Grandfather     Cerebrovascular Disease Maternal Grandfather     Blood Disease Maternal Grandfather     Heart Disease Maternal Grandfather     Glaucoma Maternal Grandfather     Cancer Paternal Grandmother     Cancer - colorectal Paternal Grandmother     Colitis Paternal Grandmother     Colon Cancer Paternal Grandmother     Diverticulitis Paternal Grandmother     Respiratory Paternal Grandfather         emphysema     Colitis Paternal Grandfather     Colon Cancer Paternal Grandfather     Diverticulitis Paternal Grandfather     Heart Disease Daughter     Asthma Daughter     Melanoma No family hx of     Anesthesia Reaction No family hx of     Clotting Disorder No family hx of              Allergies:   Reviewed and edited as appropriate     Allergies   Allergen Reactions    Bee Venom Swelling    Azithromycin Diarrhea    Erythromycin      Other reaction(s): GI intolerance, Vomiting    Fentanyl Other (See  Comments)     sweating  sweating    Prochlorperazine Fatigue     Other reaction(s): Other (see comments)  Fatigue    Buspirone      Other reaction(s): GI intolerance  vomiting    Erythrocin Nausea and Vomiting    Gluten Meal      Celiac disease    Topamax [Topiramate]      Made her lethargic    Zithromax [Azithromycin Dihydrate] Diarrhea    Enbrel [Etanercept] Hives and Rash            Medications:     Current Facility-Administered Medications   Medication    acetaminophen (TYLENOL) tablet 650 mg    Or    acetaminophen (TYLENOL) Suppository 650 mg    albuterol (PROVENTIL HFA/VENTOLIN HFA) inhaler    glucose gel 15-30 g    Or    dextrose 50 % injection 25-50 mL    Or    glucagon injection 1 mg    doxycycline (VIBRAMYCIN) 100 mg vial to attach to  mL bag    HYDROmorphone (PF) (DILAUDID) injection 0.5 mg    [Held by provider] hydrOXYzine (ATARAX) tablet 25 mg    insulin aspart (NovoLOG) injection (RAPID ACTING)    insulin aspart (NovoLOG) injection (RAPID ACTING)    lactated ringers infusion 1,000 mL    lidocaine (LMX4) cream    lidocaine 1 % 0.1-1 mL    loperamide (IMODIUM) capsule 2 mg    melatonin tablet 3 mg    [Held by provider] methocarbamol (ROBAXIN) tablet 750 mg    ondansetron (ZOFRAN) injection 4 mg    pantoprazole (PROTONIX) IV push injection 40 mg    piperacillin-tazobactam (ZOSYN) 4.5 g vial to attach to  mL bag    polyethylene glycol (MIRALAX) Packet 17 g    senna-docusate (SENOKOT-S/PERICOLACE) 8.6-50 MG per tablet 1 tablet    Or    senna-docusate (SENOKOT-S/PERICOLACE) 8.6-50 MG per tablet 2 tablet    sodium chloride (PF) 0.9% PF flush 3 mL    sodium chloride (PF) 0.9% PF flush 3 mL    [Held by provider] tolterodine ER (DETROL LA) 24 hr capsule 2-4 mg    venlafaxine (EFFEXOR) tablet 150 mg     Current Outpatient Medications   Medication Sig    acetaminophen (TYLENOL) 325 MG tablet Take 3 tablets (975 mg) by mouth every 6 hours    albuterol (PROAIR HFA/PROVENTIL HFA/VENTOLIN HFA) 108 (90  Base) MCG/ACT inhaler Inhale 2 puffs into the lungs every 4 hours as needed for shortness of breath / dyspnea    diphenoxylate-atropine (LOMOTIL) 2.5-0.025 MG tablet Take 1 tablet by mouth 2 times daily    famotidine (PEPCID) 20 MG tablet Take 1 tablet (20 mg) by mouth 2 times daily    hydrOXYzine (ATARAX) 25 MG tablet Take 1 tablet (25 mg) by mouth 2 times daily    loperamide (IMODIUM A-D) 2 MG tablet Take 2 tabs daily and increase as needed until output is apple sauce consistency. Max of 8 tabs (16 mg) per day.    loperamide (IMODIUM A-D) 2 MG tablet Take 1 tablet (2 mg) by mouth 4 times daily as needed for other (high ileostomy output)    medical cannabis (Patient's own supply) See Admin Instructions (The purpose of this order is to document that the patient reports taking medical cannabis.  This is not a prescription, and is not used to certify that the patient has a qualifying medical condition.)    menthol (ICY HOT) 5 % PTCH Apply 1 patch topically every 8 hours as needed for muscle soreness    menthol-zinc oxide (CALMOSEPTINE) 0.44-20.6 % OINT ointment Apply topically 4 times daily as needed for skin protection    metFORMIN (GLUCOPHAGE) 500 MG tablet TAKE 2 TABLETS BY MOUTH 2 TIMES DAILY WITH MEALS (Patient taking differently: Take 500 mg by mouth 2 times daily (with meals) TAKE 2 TABLETS BY MOUTH 2 TIMES DAILY WITH MEALS)    methocarbamol (ROBAXIN) 750 MG tablet Take 1 tablet (750 mg) by mouth 4 times daily as needed for muscle spasms    ondansetron (ZOFRAN ODT) 4 MG ODT tab Take 1 tablet (4 mg) by mouth every 8 hours as needed for nausea    ondansetron (ZOFRAN ODT) 4 MG ODT tab Take 1 tablet (4 mg) by mouth every 8 hours as needed for nausea or vomiting (Patient taking differently: Take 4 mg by mouth as needed for nausea or vomiting)    Ostomy Supplies MISC 20 each daily    oxyCODONE (ROXICODONE) 5 MG tablet Take 1-2 tablets (5-10 mg) by mouth every 6 hours as needed for severe pain    phentermine  (ADIPEX-P) 15 MG capsule Take 15 mg by mouth every morning    tolterodine ER (DETROL LA) 2 MG 24 hr capsule Take 1-2 capsules (2-4 mg) by mouth daily    venlafaxine (EFFEXOR) 50 MG tablet Take 1 tablet (50 mg) by mouth 2 times daily Total of 150 mg twice daily    venlafaxine (EFFEXOR) 75 MG tablet TAKE TWO TABLETS BY MOUTH TWICE A DAY     Facility-Administered Medications Ordered in Other Encounters   Medication    lidocaine 1 % 9 mL    sodium bicarbonate 8.4 % injection 1 mEq             Review of Systems:     A complete review of systems was performed and is negative except as noted in the HPI           Physical Exam:   Temp: 98.8  F (37.1  C) Temp src: Oral BP: 115/65 Pulse: 97   Resp: 18 SpO2: 95 % O2 Device: None (Room air)    Wt:   Wt Readings from Last 2 Encounters:   10/26/23 84.8 kg (187 lb)   10/23/23 85.3 kg (188 lb)        General: 48 year old female lying in bed in NAD. Appears acutely ill, fatigued.  Answers appropriately.    HEENT: Head is AT/NC. Sclera anicteric. +Hoarse voice.   Lungs: No increased work of breathing, speaking in full sentences, equal chest rise. On 1 LPM via nasal cannula. +cough.   Heart: Regular rate. Peripheral perfusion intact.  Abdomen: Soft, non-distended, +generalized tenderness without rebound or guarding.  BS +. No peritoneal signs. +RLQ ileostomy with orange-brown liquid stool.   Extremities: WWP.  Musculoskeletal: No gross deformity.  Skin: No jaundice or rash on exposed skin.   Neurologic: Grossly non-focal.  CN 2-12 grossly intact.   Mental status/Psych: A&O. Asks/answers questions appropriately. Pleasant, interactive. Intermittently tearful.            Data:   LAB WORK:    [facility]  Recent Labs   Lab 10/31/23  0556 10/31/23  0156 10/31/23  0145 10/30/23  2141 10/30/23  1436 10/27/23  2000 10/26/23  1657     --  133*  --  131* 133* 132*   POTASSIUM 3.7  --   --   --  3.5 4.1 4.9   CHLORIDE 101  --   --   --  96* 97* 96*   PATRICIA 8.8  --   --   --  8.8 9.7 10.4*   CO2 26   --   --   --  21* 18* 19*   BUN 3.7*  --   --   --  3.4* 14.7 13.7   CR 0.83  --  0.90  --  0.83 1.01* 0.99*   GLC 99 154*  --  122* 132* 146* 121*     CBC  Recent Labs   Lab 10/31/23  0556 10/30/23  1437 10/26/23  1657 10/25/23  1401   WBC 6.3 8.2 12.0* 12.0*   RBC 4.02 4.14 4.98 5.06   HGB 10.8* 11.3* 13.4 13.6   HCT 33.5* 34.2* 41.5 42.5   MCV 83 83 83 84   MCH 26.9 27.3 26.9 26.9   MCHC 32.2 33.0 32.3 32.0   RDW 16.6* 16.6* 15.8* 15.8*    294 489* 448     INR  Recent Labs   Lab 10/30/23  1436   INR 1.07     LFTs  Recent Labs   Lab 10/31/23  0556 10/30/23  1436 10/27/23  2000 10/26/23  1657   ALKPHOS 207* 258* 234* 259*   AST 49* 92* 73* 90*   * 146* 147* 197*   BILITOTAL 0.2 0.2 0.2 0.3   PROTTOTAL 6.3* 7.1 7.3 8.4*   ALBUMIN 3.3* 3.8 3.9 4.8      PANC  Recent Labs   Lab 10/30/23  1436   AMYLASE 19*       IMAGING:    CT ENTEROGRAPHY WITH CONTRAST, 10/30/2023 6:06 PM  FINDINGS:     Lower thorax: New left basilar consolidative opacity in the  posterior/lateral lower lobe.  Trace left pleural effusion with  adjacent atelectasis.     Liver: No mass. No intrahepatic biliary ductal dilation.  Geographic  relative hyperenhancement of the portion of segment 7, likely  perfusional.     Biliary System: Normal gallbladder. No extrahepatic biliary ductal  dilation.     Pancreas: Significant fatty atrophy of the pancreatic parenchyma,  greatest at the pancreatic body and tail.     Adrenal glands: No mass or nodules     Spleen: Normal.     Kidneys: No suspicious mass, obstructing calculus or hydronephrosis.     Gastrointestinal tract: Colectomy and completion proctectomy with  ileal J-pouch formation. Fluid-filled small bowel which is not  abnormally distended. No focal lesions. No inflammatory stranding.     Mesentery/peritoneum/retroperitoneum: No mass. No free fluid or air.     Lymph nodes: No significant lymphadenopathy.     Vasculature: Patent major abdominal vasculature.     Pelvis: Urinary bladder is  normal.  The uterus is within normal  limits. Bilateral thin-walled ovarian cysts measuring up to 3.3 cm on  the left and 3.2 cm on the right. T     Osseous structures: No aggressive or acute osseous lesion.      Soft tissues: Soft tissue stranding in the anterior abdomen and right  of midline, likely postoperative.                                                                      IMPRESSION:   1. Surgical changes of total colectomy and completion proctectomy with  right lower quadrant diverting ileostomy. No evidence of infectious or  inflammatory bowel pathology. No intra-abdominal abscess.  2. New left lower lobe consolidative opacity, compatible with  pneumonia.  3. Trace left pleural effusion with adjacent atelectasis.        =======================================================================

## 2023-10-31 NOTE — PROVIDER NOTIFICATION
ED E - S.B    Pt. pain meds not working still rates 8/10. After oxy administration. Dilaudid q15 from ED still active. Ok to give?    RN [id number]    Provider call back: okay to give 1 more dilaudid dose from ED provider

## 2023-10-31 NOTE — PROGRESS NOTES
[facility]    Internal Medicine Progress Note - [facility] 2 Service    Resident/Fellow Attestation   I, Ale Brasher MD, was present with the medical/ASHANTI student who participated in the service and in the documentation of the note.  I have verified the history and personally performed the physical exam and medical decision making.  I agree with the assessment and plan of care as documented in the note as edited by me.     Ale Brasher MD  PGY2  Date of Service (when I saw the patient): 10/31/23      Main Plans for Today   - Sputum sample for expanded pneumonia workup  - Dental consult for oral aphthous ulcers  - Magic mouthwash, biotine, looking into oral steroid cream  - Consult colorectal surgery for expanded GI workup  - Consult psychology for support with illness coping  - Consult psychiatry for venlafaxine changes    Assessment & Plan   Doretha Yu is a 48 year old female admitted on 10/30/2023. 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 Pt s/p TAC with end ileostomy (laparoscopic) on 6/15/21 with subsequent parastomal hernia now s/p robotic protectectomy, ileostomy takedown, and creation of J-pouch on 9/5/23. 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 Pt presents with fever x 3 days and increased ostomy output x 2 weeks, admitted at recommendation of GI. Pt states that she has had increased ostomy output for the last two weeks. Fever for the last 3 days. Pt states she has had very poor po intake for the last few days. No urinary sx, shortness of breath, cough. No recent travel. Lives with 15 y/o daughter. No recent abx.     Nivolumab induced UC  Increased ostomy output  H/o nivolumab-induced ulcerative colitis s/p TAC with end  ileostomy (laparoscopic) on 6/15/21 with subsequent parastomal hernia now s/p robotic protectectomy, ileostomy takedown, and creation of J-pouch on 9/5/23. Pt states since this time she has noticed increased ostomy output, but in the last two weeks much more so with correlating reduced PO intake. Has been seen in the ED on several occasions and has been getting OP IVF at an infusion center as output so significant. No blood in output. No recent abx.      FLUIDS              - IVF LR at 125 cc/hr, goal to match I/O     WORKUP              - Per GI as below                      - Blood cultures              - C. Diff: Negative              - UA moderate LE and few bacteria, UC pending              - Amylase: WNL              - Urine legionella: Negative   - Enteric Bacteria and Virus: Negative   - Fecal calprotectin pending    - CT enterography: LLL consolidation, surgical changes of total colectomy and completion proctectomy w/R quadrant diverting ileostomy w/o evidence of infectious or inflammatory bowel pathology    ABX              - Zosyn 10/30-*     CONSULTS              - GI consulted, appreciate recs  - Infectious Stool Studies: C. Difficile Toxin B PCR with reflex to C. Difficile Antigen and Toxins A/B EIA, Enteric Bacteria and Virus Panel PCR.  - Fecal Calprotectin.   - Trend daily labs: CBC, and CMP.   - CT Enterography for further assessment of bowel, infection/abscess, or other intra-abdominal pathology.   - IV Pantoprazole 40 mg BID.  - Colorectal surgery consulted, appreciate recs     Potential concomitant pneumonia  CT enterography shows possible infiltrate LLL, well covered with zosyn, will add azithromycin for atypical coverage while undergoing workup. Urine Legionella and Strep as well as respiratory viral panel negative.   - Zosyn as above + doxycycline added x 3 days 500 mg (10/30-11/1)     Hypomagnesemia  Likely 2/2 diarrhea.   -Magnesium protocol    Aphthous ulcers  Sores on upper lip, lower  lip, and L side of tongue. Endorses these occur when she is sick. May be stress induced or another manifestation of her autoimmune illnesses.   - Consult dental  - Lidocaine swish and spit   - Looking into oral steroid cream    Hyponatremia  Likely hypovolemic hyponatremia in setting of increased UOP. To be complete can get baseline studies. Urine Legionella negative. Osmolality decreased in blood 271 and urine 86.   - Resolved      Chronic Transaminitis  - Pending above workup  - Hepatitis panel negative      Depression  Anxiety  Patient endorses struggle coping with chemotherapy-induced autoimmune diseases that have led to GI surgeries and complications. Depression and anxiety are uncontrolled even on EFFEXOR (generic venlafaxine is refused). Has asked for help from psych when talking to GI.   - Currently PTA venlafaxine 150mg BID  - Consult psychology for coping strategies and support  - Consult psychiatry with help with meds     DM2  A1c 6.5% last 10/25. Takes metformin 500 BID as OP.   - Hold metformin  - LDSSI     Hypothyroidism  - Do not see PTA meds on list, can ask if she takes in AM     Normocytic Anemia  Baseline Hb 13, here with Hb of 11.3. Plts wnl.      STORMY  - CPAP     Chronic Pain  - APAP  - oxycodone 5 mg q 4  - Dilaudid 0.2-0.5 q 3 for short term only     Melanoma history:   - see H&P subjective section for full history, follows with [facility].     DISCHARGE PLANNING:     NEW MEDS:   -    MEDS TO STOP/HOLD:   -    FOLLOWUP:   -    Diet: Advance Diet as Tolerated: Clear Liquid Diet  Fluids: 125cc/hr  Lines: PIV  Major Catheter: Not present    DVT Prophylaxis: Enoxaparin (Lovenox) SQ  Code Status: Full Code     Expected Discharge Date: 10/31/2023             [employer]  Medical Student (MS3)  [employer]    Pt staffed with Dr. Enoc Robledo MD who agrees with this plan.     Kathy Brasher DO   Internal Medicine PGY2  [facility]  Please use on call sytem for  paging    Interval History   Doretha was interviewed in her room in the ED. She is hoarse and states she had new onset cough overnight and work up with trouble speaking. Sputum has been coming up but it is clear. During the interview when she is able to expectorate, her voice clears for 30 seconds at a time. She has left sided pleuritic chest pain as well. She is informed imaging shows lung infiltrate which is consistent with exam for pneumonia despite lack of infectious labs. We are covering it with antibiotics.    She has been having aphthous ulcers which often occur when she is sick. She is informed that dental is on the case, lidocaine is provided now, and they will be recommending her a cream.     She agrees with the review of her GI surgery history and states she continues having high ostomy output despite restricting her intake. There have no been any changes over night. She is informed that GI infectious panels are negative but GI is following the case and is recommending consultation with colorectal surgery. She is agreeable.     Physical Exam   Vital Signs: Temp: 98.8  F (37.1  C) Temp src: Oral BP: 115/65 Pulse: 97   Resp: 18 SpO2: 95 % O2 Device: None (Room air)    Weight: 0 lbs 0 oz    General: Pt laying comfortably in bed, in no acute distress. Not requiring oxygen.   Cardio: Regular rate and rhythm. No murmurs, gallops, rubs.   Pulm: LLL crackles, other lung fields clear.   Abd: Active bowel sounds. Tenderness to palpation in RUQ and LUQ with rebound but no gaurding.   Lower extremities: No lower extremity edema present bilaterally.   Neuro: Alert and oriented to person, place, and time.   Psych: Appropriate mood and affect.       Data   Recent Labs   Lab 10/31/23  1712 10/31/23  1136 10/31/23  0852 10/31/23  0556 10/31/23  0156 10/31/23  0145 10/30/23  2141 10/30/23  1437 10/30/23  1436 10/27/23  2000 10/26/23  1657   WBC  --   --   --  6.3  --   --   --  8.2  --   --  12.0*   HGB  --   --   --  10.8*   --   --   --  11.3*  --   --  13.4   MCV  --   --   --  83  --   --   --  83  --   --  83   PLT  --   --   --  249  --   --   --  294  --   --  489*   INR  --   --   --   --   --   --   --   --  1.07  --   --    NA  --   --   --  137  --  133*  --   --  131* 133* 132*   POTASSIUM  --   --   --  3.7  --   --   --   --  3.5 4.1 4.9   CHLORIDE  --   --   --  101  --   --   --   --  96* 97* 96*   CO2  --   --   --  26  --   --   --   --  21* 18* 19*   BUN  --   --   --  3.7*  --   --   --   --  3.4* 14.7 13.7   CR  --   --   --  0.83  --  0.90  --   --  0.83 1.01* 0.99*   ANIONGAP  --   --   --  10  --   --   --   --  14 18* 17*   PATRICIA  --   --   --  8.8  --   --   --   --  8.8 9.7 10.4*   * 100* 97 99   < >  --    < >  --  132* 146* 121*   ALBUMIN  --   --   --  3.3*  --   --   --   --  3.8 3.9 4.8   PROTTOTAL  --   --   --  6.3*  --   --   --   --  7.1 7.3 8.4*   BILITOTAL  --   --   --  0.2  --   --   --   --  0.2 0.2 0.3   ALKPHOS  --   --   --  207*  --   --   --   --  258* 234* 259*   ALT  --   --   --  104*  --   --   --   --  146* 147* 197*   AST  --   --   --  49*  --   --   --   --  92* 73* 90*    < > = values in this interval not displayed.       Imaging results reviewed over the past 24 hrs:   Recent Results (from the past 24 hour(s))   CT Enterography with Contrast    Narrative    EXAMINATION: CT ENTEROGRAPHY WITH CONTRAST, 10/30/2023 6:06 PM    INDICATION: -- CT enterography for further assessment of bowel,  infection/abscess or other intraabdominal pathology    COMPARISON STUDY: CT chest abdomen and pelvis 10/23/2023    TECHNIQUE: CT scan of the abdomen and pelvis was performed on  multidetector CT scanner using volumetric acquisition technique and  images were reconstructed in multiple planes with variable thickness  and reviewed on dedicated workstations.     CONTRAST: 88 mm Isovue 360 injected IV with 1500 mL Breeza oral  contrast    CT scan radiation dose is optimized to minimum requisite dose  using  automated dose modulation techniques.    FINDINGS:    Lower thorax: New left basilar consolidative opacity in the  posterior/lateral lower lobe.  Trace left pleural effusion with  adjacent atelectasis.    Liver: No mass. No intrahepatic biliary ductal dilation.  Geographic  relative hyperenhancement of the portion of segment 7, likely  perfusional.    Biliary System: Normal gallbladder. No extrahepatic biliary ductal  dilation.    Pancreas: Significant fatty atrophy of the pancreatic parenchyma,  greatest at the pancreatic body and tail.    Adrenal glands: No mass or nodules    Spleen: Normal.    Kidneys: No suspicious mass, obstructing calculus or hydronephrosis.    Gastrointestinal tract: Colectomy and completion proctectomy with  ileal J-pouch formation. Fluid-filled small bowel which is not  abnormally distended. No focal lesions. No inflammatory stranding.    Mesentery/peritoneum/retroperitoneum: No mass. No free fluid or air.    Lymph nodes: No significant lymphadenopathy.    Vasculature: Patent major abdominal vasculature.    Pelvis: Urinary bladder is normal.  The uterus is within normal  limits. Bilateral thin-walled ovarian cysts measuring up to 3.3 cm on  the left and 3.2 cm on the righ. T    Osseous structures: No aggressive or acute osseous lesion.      Soft tissues: Soft tissue stranding in the anterior abdomen and right  of midline, likely postoperative.      Impression    IMPRESSION:   1. Surgical changes of total colectomy and completion proctectomy with  right lower quadrant diverting ileostomy. No evidence of infectious or  inflammatory bowel pathology. No intra-abdominal abscess.  2. New left lower lobe consolidative opacity, compatible with  pneumonia.  3. Trace left pleural effusion with adjacent atelectasis.    I have personally reviewed the examination and initial interpretation  and I agree with the findings.    KEESHA WALKER DO         SYSTEM ID:  [id number]

## 2023-10-31 NOTE — PHARMACY-ADMISSION MEDICATION HISTORY
Pharmacy Intern Admission Medication History    Admission medication history is complete. The information provided in this note is only as accurate as the sources available at the time of the update.    Information Source(s): Patient and CareEverywhere/SureScripts via in-person    Pertinent Information: Pt was a good historian and stated there have been many changes to her medication list within the past month and many medications have been put on hold. She has not taken phentermine since surgery in Sept.    Changes made to PTA medication list:  Added:   Vit B12  Deleted:   Duplicate loperamde  Menthol patch (not taking)  Menthol-zinc oxide ointment (Not taking)  Duplicate ondansetron   Oxycodone (not taking at home, none left at home)  Tolterodine (Started at [place] ED in Sept, never taken at home)  Duplicate venlafaxine  Changed:   APAP -> Currently taking 1000 mg Q6H prn (Consistently takes 4,000 mg/day APAP)  Atarax -> prn    Allergies reviewed with patient and updates made in EHR: yes    Medication History Completed By: Branden Wesley 10/31/2023 5:48 PM        Prior to Admission medications    Medication Sig Last Dose Taking? Auth Provider Long Term End Date   acetaminophen (TYLENOL) 500 MG tablet Take 1,000 mg by mouth every 6 hours as needed for mild pain 10/30/2023 Yes Unknown, Entered By History No    albuterol (PROAIR HFA/PROVENTIL HFA/VENTOLIN HFA) 108 (90 Base) MCG/ACT inhaler Inhale 2 puffs into the lungs every 4 hours as needed for shortness of breath / dyspnea More than a month Yes Ollie Cuevas MD Yes    Cyanocobalamin (B-12 PO) Take 1 tablet by mouth daily Past Week Yes Unknown, Entered By History     hydrOXYzine (ATARAX) 25 MG tablet Take 1 tablet (25 mg) by mouth 2 times daily  Patient taking differently: Take 25 mg by mouth nightly as needed Past Week Yes Jena Thompson APRN CNP     loperamide (IMODIUM A-D) 2 MG tablet Take 1 tablet (2 mg) by mouth 4 times daily as  needed for other (high ileostomy output)  Patient taking differently: Take 4 mg by mouth 3 times daily as needed for other (high ileostomy output) Past Week Yes Mariela Crandall PA-C     medical cannabis (Patient's own supply) See Admin Instructions (The purpose of this order is to document that the patient reports taking medical cannabis.  This is not a prescription, and is not used to certify that the patient has a qualifying medical condition.)  Yes Reported, Patient     metFORMIN (GLUCOPHAGE) 500 MG tablet TAKE 2 TABLETS BY MOUTH 2 TIMES DAILY WITH MEALS  Patient taking differently: Take 500 mg by mouth 2 times daily (with meals) TAKE 2 TABLETS BY MOUTH 2 TIMES DAILY WITH MEALS Past Week Yes Anna Naidu MD Yes    ondansetron (ZOFRAN ODT) 4 MG ODT tab Take 1 tablet (4 mg) by mouth every 8 hours as needed for nausea or vomiting  Patient taking differently: Take 4 mg by mouth as needed for nausea or vomiting Past Week Yes Lowell Pérez MD     venlafaxine (EFFEXOR) 75 MG tablet TAKE TWO TABLETS BY MOUTH TWICE A DAY Past Week Yes Anna Naidu MD Yes    diphenoxylate-atropine (LOMOTIL) 2.5-0.025 MG tablet Take 1 tablet by mouth 2 times daily   Jena Thompson APRN CNP     famotidine (PEPCID) 20 MG tablet Take 1 tablet (20 mg) by mouth 2 times daily   Quinton Ram MD     methocarbamol (ROBAXIN) 750 MG tablet Take 1 tablet (750 mg) by mouth 4 times daily as needed for muscle spasms   Quinton Ram MD     Ostomy Supplies MISC 20 each daily   Jena Thompson APRN CNP     phentermine (ADIPEX-P) 15 MG capsule Take 15 mg by mouth every morning   Reported, Patient

## 2023-10-31 NOTE — UTILIZATION REVIEW
"  Admission Status; Secondary Review Determination         Under the authority of the Utilization Management Committee, the utilization review process indicated a secondary review on the above patient.  The review outcome is based on review of the medical records, discussions with staff, and applying clinical experience noted on the date of the review.        (xxx)      Inpatient Status Appropriate - This patient's medical care is consistent with medical management for inpatient care and reasonable inpatient medical practice.      () Observation Status Appropriate - This patient does not meet hospital inpatient criteria and is placed in observation status. If this patient's primary payer is Medicare and was admitted as an inpatient, Condition Code 44 should be used and patient status changed to \"observation\".   () Admission Status NOT Appropriate - This patient's medical care is not consistent with medical management for Inpatient or Observation Status.          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 Pt s/p TAC with end ileostomy (laparoscopic) on 6/15/21 with subsequent parastomal hernia now s/p robotic protectectomy, ileostomy takedown, and creation of J-pouch on 9/5/23. She was admitted on 10/30/2023 with fever x 3 days and increased ostomy output x 2 weeks. Pt states she has had very poor po intake for the last few days.  CT enterography showed possible infiltrate.  [place] for possible legionella.  CRP markedly elevated at 133.  She is admitted for IVF, IV antibiotics, IV antiemetics, IV pain medication, and further work up. IP status is appropriate.    The severity of illness, intensity of service provided, expected LOS and risk for adverse outcome make the care complex, high risk and appropriate for hospital admission.        The information on this document is " developed by the utilization review team in order for the business office to ensure compliance.  This only denotes the appropriateness of proper admission status and does not reflect the quality of care rendered.         The definitions of Inpatient Status and Observation Status used in making the determination above are those provided in the CMS Coverage Manual, Chapter 1 and Chapter 6, section 70.4.      Sincerely,     Rylee Seymour MD  Physician Advisor   Utilization Review/ Case Management  [facility].

## 2023-10-31 NOTE — PROGRESS NOTES
Clinic Care Coordination Contact  Ambulatory Care Coordination to Inpatient Care Management   Hand-In Communication    Date:  October 31, 2023  Name: Doretha Yu is enrolled in Ambulatory Care Coordination program and I am the Lead Care Coordinator.  CC Contact Information: Epic InBasket + phone  Payor Source: Payor: MEDICA / Plan: MEDICA CHOICE / Product Type: Indemnity /   Current services in place: None   Please see the CC Snaphot and Care Management Flowsheets for specific  details of this Doretha Yu care plan.   Additional details/specific concerns r/t this admission:    Goals of Care I will have a good plan for wound care in the next week  Barriers: Anxiety  Unable to find home care services   Strengths: lives with daughter   Patient expressed understanding of goal: Yes   Action steps to achieve this goal:  1. I will have a Community Paramedic visit Not needed   2. Care coordinator will call surgeons office and ostomy RN for supplies   3. I will keep wound clinic appointments       I will follow this admission in Epic. Please feel free to contact me with questions or for further collaboration in discharge planning.     [facility]   Gisel Marte RN, Care Coordinator   [facility]   E-mail [email or web address]   [phone]

## 2023-10-31 NOTE — DISCHARGE INSTRUCTIONS
Outpatient GI:  Please to call to schedule your 4 week follow-up in [facility] per recommendation by Beti Issa PA-C on 10/30/2023.  - [facility] phone: [phone], option 1 for clinic scheduling.         [facility]     Name: Doretha Yu : 1975  Date: 2023    To order your ostomy supplies    The ostomy Supplier needs this supply list  to process your order. You will need to fax/deliver this list, along with your Insurance information. Your home care nurse can assist with this process.    List of Ostomy Distributors      [facility] [employer]  Ph. [phone] Fax # [phone]  [facility]   Ph. [phone]  [employer]   Ph. [phone]  [facility]   Ph. [phone]  Or Call your insurance provider for their preferred supplier    Your Medical Supplier will need your surgeon's name, phone and fax number    Clinic:                     Phone                            Fax  Colorectal Surgery:    [phone]    Verbal Order for ostomy supplies for 1 Month per:                                       Kylee Salcido RN, CWOCN                                                                  Authorizing Provider: Judi Sanabria PA-C     Quantity of pouches:     20 pouches/month    Request the following supplies:      Keith    1 piece soft convex high output drainable soft tap cut to fit up to 1-1/2 inch #[id number]    Accessories  Keith high output collection bag #5551  2  Sarah ring #[id number]     Coloplast Brava convex barrier ring 1 inch #71575    Adapt powder #[id number]    No sting film barrier # 1195                          Brava elastic barrier strips curved # [id number]    [place] belt large #4021 (29-49 )              Change your pouch two to three times a week, more often if leaking.      . Call the Ostomy Nurse at [facility]  with questions/concerns      [address], [place] : [phone]         Outpatient Infusion:  [facility]   [address]   [place], [address]  Phone: [phone]   ** Hours: M-F [id number]     11/10 - Friday @ 1:30 PM   11/13 - Monday @ 8:00 AM   11/15 - Wednesday @ 2:00 PM   11/17 - Friday @ 0800

## 2023-10-31 NOTE — CONSULTS
Initial Psychiatric Consult   Consult date: October 31, 2023  IPOOJA. Ms. Yu is a 48-year-old female who has a very significant medical history including melanoma treated with immunotherapy which apparently caused colitis she now has a J-pouch and diverting loop ileostomy she has a long history of recurrent major depressive disorder treated with Effexor 150 mg p.o. twice daily.  I am asked to evaluate her depression and anxiety by Dr. Ortiz  Labs and imaging reviewed. Patient seen and evaluated by Bright Douglas MD          HPI:   Prior to interviewing this patient I had an opportunity to review the electronic medical record and note that her last psychiatric consultation was 1/13/2023 by Dr. Camacho at that time the patient had anxiety and an adjustment disorder.  She was on her Effexor but still feeling overwhelmed so low-dose Seroquel was added.    On my interview with the patient today she was in some pain with a new pneumonia as well as abdominal difficulties.  She also had laryngitis and it was somewhat difficult for her to speak.  She reports that both her depression and anxiety have become worse in the last week and she is wondering if she could have an augmentation strategy for her Effexor.  We discussed using Abilify 2 mg in the morning.  We did discuss the potential side effects.        Past Psychiatric History:   Depression and anxiety        Substance Use and History:   No clear history of abuse the patient does use medical marijuana        Past Medical History:   PAST MEDICAL HISTORY:   Past Medical History:   Diagnosis Date    Abnormal MRI     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     Anxiety     Basal cell carcinoma     Cervical high risk HPV (human papillomavirus) test positive 12/13/2019    See problem list    Colitis     Depression     Diabetes mellitus, iatrogenic (H) 01/28/2020    Esophageal reflux     Inflammatory arthritis     Insomnia     Intestinal giardiasis  2018    Lumbago     left lower back pain    Lymphedema     Malignant melanoma (H)     Melanoma (H) 10/23/2017    Migraines     Mild persistent asthma     Morbid obesity with BMI of 40.0-44.9, adult (H)     STORMY (obstructive sleep apnea)     Prothrombin deficiency (H)     takes 81mg asa daily    Stroke (cerebrum) (H)     During     TIA (transient ischemic attack)     Type 2 diabetes mellitus (H)     Ulcerative pancolitis (H)        PAST SURGICAL HISTORY:   Past Surgical History:   Procedure Laterality Date    APPENDECTOMY      COLONOSCOPY N/A 10/18/2017    Procedure: COLONOSCOPY;  Colon;  Surgeon: Debbie Stephens MD;  [facility]    COLONOSCOPY N/A 2018    Procedure: COMBINED COLONOSCOPY, SINGLE OR MULTIPLE BIOPSY/POLYPECTOMY BY BIOPSY;  colon;  Surgeon: Benita Schumacher MD;  Location: [facility]    COLONOSCOPY      multiple since  to present - about 6 total    DAVINCI ASSISTED TRANSANAL TOTAL MESORECTAL EXCISION N/A 2023    Procedure: COMPLETION PROCTECTOMY, ROBOT-ASSISTED, ILEAL POUCH ANASTAMOSIS;  Surgeon: Quinton Ram MD;  Location: [facility]    DISSECT LYMPH NODE AXILLA Left 10/23/2017    Procedure: DISSECT LYMPH NODE AXILLA;  Left Axillary Lymph Node Dissection ;  Surgeon: Laurent Cool MD;  Location: [facility]    EXAM UNDER ANESTHESIA PELVIC N/A 2020    Procedure: EXAM UNDER ANESTHESIA, PELVIS; with Cervical Biopsies, Vaginal Biopsy and Endocervical Curettings;  Surgeon: Melina Jung MD;  Location: [facility]    GYN SURGERY  ,         LAPAROSCOPIC ASSISTED COLECTOMY N/A 06/15/2021    Procedure: laparoscopic total abdominal colectomy, end ileostomy;  Surgeon: Quinton Ram MD;  Location: [facility]    REPAIR MOHS Left 2017    Procedure: REPAIR MOHS;  Left Upper Lid Moh's Reconstruction;  Surgeon: Kisha Bosch MD;  Location: [facility]    SIGMOIDOSCOPY FLEXIBLE N/A 2023    Procedure: Sigmoidoscopy flexible;  Surgeon:  Quinton Ram MD;  Location:  OR             Family History:   FAMILY HISTORY:   Family History   Problem Relation Age of Onset    Cancer Mother 45        lung    Neurologic Disorder Mother         epilepsy    Lipids Father     Gastrointestinal Disease Father         diverticulitis     Depression Father     Colitis Father     Colon Cancer Father     Diverticulitis Father     Depression Sister     Cancer Maternal Grandmother     Blood Disease Maternal Grandmother         lymphoma     Arthritis Maternal Grandmother     Diabetes Maternal Grandmother     Depression Maternal Grandmother     Macular Degeneration Maternal Grandmother     Glaucoma Maternal Grandmother     Diabetes Maternal Grandfather     Cerebrovascular Disease Maternal Grandfather     Blood Disease Maternal Grandfather     Heart Disease Maternal Grandfather     Glaucoma Maternal Grandfather     Cancer Paternal Grandmother     Cancer - colorectal Paternal Grandmother     Colitis Paternal Grandmother     Colon Cancer Paternal Grandmother     Diverticulitis Paternal Grandmother     Respiratory Paternal Grandfather         emphysema     Colitis Paternal Grandfather     Colon Cancer Paternal Grandfather     Diverticulitis Paternal Grandfather     Heart Disease Daughter     Asthma Daughter     Melanoma No family hx of     Anesthesia Reaction No family hx of     Clotting Disorder No family hx of                Social History:   SOCIAL HISTORY:   Social History     Tobacco Use    Smoking status: Former     Packs/day: 1.00     Years: 5.00     Additional pack years: 0.00     Total pack years: 5.00     Types: Cigarettes     Quit date: 3/20/1998     Years since quittin.6    Smokeless tobacco: Never   Substance Use Topics    Alcohol use: Not Currently       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  The review of systems was quite positive with a headache, hard of hearing, poor vision, chest pain and shortness of breath, patient is required nasal cannula oxygen, she has an ileostomy and abdominal discomfort she believes she is somewhat dehydrated and has pneumonia       Medications:      artificial saliva  2 spray Swish & Spit 4x Daily    doxycycline  100 mg Intravenous Q12H    enoxaparin ANTICOAGULANT  40 mg Subcutaneous Q24H    [Held by provider] hydrOXYzine  25 mg Oral BID    insulin aspart  1-3 Units Subcutaneous TID AC    insulin aspart  1-3 Units Subcutaneous At Bedtime    pantoprazole  40 mg Intravenous BID    piperacillin-tazobactam  4.5 g Intravenous Q6H    sodium chloride (PF)  3 mL Intracatheter Q8H    venlafaxine  150 mg Oral BID              Allergies:     Allergies   Allergen Reactions    Bee Venom Swelling    Azithromycin Diarrhea    Erythromycin      Other reaction(s): GI intolerance, Vomiting    Fentanyl Other (See Comments)     sweating  sweating    Prochlorperazine Fatigue     Other reaction(s): Other (see comments)  Fatigue    Buspirone      Other reaction(s): GI intolerance  vomiting    Erythrocin Nausea and Vomiting    Gluten Meal      Celiac disease    Topamax [Topiramate]      Made her lethargic    Zithromax [Azithromycin Dihydrate] Diarrhea    Enbrel [Etanercept] Hives and Rash          Labs:     Recent Results (from the past 48 hour(s))   CRP inflammation    Collection Time: 10/30/23  2:36 PM   Result Value Ref Range    CRP Inflammation 133.00 (H) <5.00 mg/L   Partial thromboplastin time    Collection Time: 10/30/23  2:36 PM   Result Value Ref Range    aPTT 29 22 - 38 Seconds   INR    Collection Time: 10/30/23  2:36 PM   Result Value Ref Range    INR 1.07 0.85 - 1.15   Comprehensive metabolic panel    Collection Time: 10/30/23  2:36 PM   Result Value Ref Range    Sodium 131 (L) 135 - 145 mmol/L    Potassium 3.5 3.4 - 5.3 mmol/L    Carbon Dioxide (CO2) 21 (L) 22 -  29 mmol/L    Anion Gap 14 7 - 15 mmol/L    Urea Nitrogen 3.4 (L) 6.0 - 20.0 mg/dL    Creatinine 0.83 0.51 - 0.95 mg/dL    GFR Estimate 87 >60 mL/min/1.73m2    Calcium 8.8 8.6 - 10.0 mg/dL    Chloride 96 (L) 98 - 107 mmol/L    Glucose 132 (H) 70 - 99 mg/dL    Alkaline Phosphatase 258 (H) 35 - 104 U/L    AST 92 (H) 0 - 45 U/L     (H) 0 - 50 U/L    Protein Total 7.1 6.4 - 8.3 g/dL    Albumin 3.8 3.5 - 5.2 g/dL    Bilirubin Total 0.2 <=1.2 mg/dL   Magnesium    Collection Time: 10/30/23  2:36 PM   Result Value Ref Range    Magnesium 1.6 (L) 1.7 - 2.3 mg/dL   Amylase    Collection Time: 10/30/23  2:36 PM   Result Value Ref Range    Amylase 19 (L) 28 - 100 U/L   TSH    Collection Time: 10/30/23  2:36 PM   Result Value Ref Range    TSH 2.55 0.30 - 4.20 uIU/mL   Blood Culture Peripheral Blood    Collection Time: 10/30/23  2:36 PM    Specimen: Peripheral Blood   Result Value Ref Range    Culture No growth after 12 hours    Extra Red Top Tube    Collection Time: 10/30/23  2:36 PM   Result Value Ref Range    Hold Specimen [facility]    Acute hepatitis panel    Collection Time: 10/30/23  2:36 PM   Result Value Ref Range    Hepatitis A Antibody IgM Nonreactive Nonreactive    Hepatitis B Core Antibody IgM Nonreactive Nonreactive    Hepatitis C Antibody Nonreactive Nonreactive    Hepatitis B Surface Antigen Nonreactive Nonreactive   Lactic acid whole blood    Collection Time: 10/30/23  2:37 PM   Result Value Ref Range    Lactic Acid 0.8 0.7 - 2.0 mmol/L   CBC with platelets and differential    Collection Time: 10/30/23  2:37 PM   Result Value Ref Range    WBC Count 8.2 4.0 - 11.0 10e3/uL    RBC Count 4.14 3.80 - 5.20 10e6/uL    Hemoglobin 11.3 (L) 11.7 - 15.7 g/dL    Hematocrit 34.2 (L) 35.0 - 47.0 %    MCV 83 78 - 100 fL    MCH 27.3 26.5 - 33.0 pg    MCHC 33.0 31.5 - 36.5 g/dL    RDW 16.6 (H) 10.0 - 15.0 %    Platelet Count 294 150 - 450 10e3/uL    % Neutrophils 69 %    % Lymphocytes 21 %    % Monocytes 7 %    % Eosinophils 0 %     % Basophils 1 %    % Immature Granulocytes 2 %    NRBCs per 100 WBC 0 <1 /100    Absolute Neutrophils 5.7 1.6 - 8.3 10e3/uL    Absolute Lymphocytes 1.7 0.8 - 5.3 10e3/uL    Absolute Monocytes 0.6 0.0 - 1.3 10e3/uL    Absolute Eosinophils 0.0 0.0 - 0.7 10e3/uL    Absolute Basophils 0.0 0.0 - 0.2 10e3/uL    Absolute Immature Granulocytes 0.1 <=0.4 10e3/uL    Absolute NRBCs 0.0 10e3/uL   Blood Culture Peripheral Blood    Collection Time: 10/30/23  3:00 PM    Specimen: Peripheral Blood   Result Value Ref Range    Culture No growth after 12 hours    Symptomatic Influenza A/B, RSV, & SARS-CoV2 PCR (COVID-19) Nasopharyngeal    Collection Time: 10/30/23  3:00 PM    Specimen: Nasopharyngeal; Swab   Result Value Ref Range    Influenza A PCR Negative Negative    Influenza B PCR Negative Negative    RSV PCR Negative Negative    SARS CoV2 PCR Negative Negative   UA with Microscopic reflex to Culture    Collection Time: 10/30/23  4:17 PM    Specimen: Urine, Clean Catch   Result Value Ref Range    Color Urine Straw Colorless, Straw, Light Yellow, Yellow    Appearance Urine Slightly Cloudy (A) Clear    Glucose Urine Negative Negative mg/dL    Bilirubin Urine Negative Negative    Ketones Urine Negative Negative mg/dL    Specific Gravity Urine 1.003 1.003 - 1.035    Blood Urine Trace (A) Negative    pH Urine 6.0 5.0 - 7.0    Protein Albumin Urine 20 (A) Negative mg/dL    Urobilinogen Urine Normal Normal, 2.0 mg/dL    Nitrite Urine Negative Negative    Leukocyte Esterase Urine Moderate (A) Negative    Bacteria Urine Few (A) None Seen /HPF    Mucus Urine Present (A) None Seen /LPF    RBC Urine 4 (H) <=2 /HPF    WBC Urine 6 (H) <=5 /HPF    Squamous Epithelials Urine <1 <=1 /HPF   Urine Culture    Collection Time: 10/30/23  4:17 PM    Specimen: Urine, Clean Catch   Result Value Ref Range    Culture Culture in progress    Osmolality, random urine    Collection Time: 10/30/23  4:17 PM   Result Value Ref Range    Osmolality Urine 86 (L) 100  - 1,200 mmol/kg   EKG 12-lead, complete    Collection Time: 10/30/23  8:39 PM   Result Value Ref Range    Systolic Blood Pressure  mmHg    Diastolic Blood Pressure  mmHg    Ventricular Rate 95 BPM    Atrial Rate 95 BPM    MA Interval 144 ms    QRS Duration 86 ms     ms    QTc 432 ms    P Axis 48 degrees    R AXIS 16 degrees    T Axis 43 degrees    Interpretation ECG       Sinus rhythm  Cannot rule out Anterior infarct , age undetermined  Abnormal ECG     Glucose by meter    Collection Time: 10/30/23  9:41 PM   Result Value Ref Range    GLUCOSE BY METER POCT 122 (H) 70 - 99 mg/dL   Osmolality    Collection Time: 10/30/23 11:30 PM   Result Value Ref Range    Osmolality Blood 271 (L) 275 - 295 mmol/kg   Enteric Bacteria and Virus Panel PCR    Collection Time: 10/31/23 12:58 AM    Specimen: Ostomy Pouch; Stool   Result Value Ref Range    Campylobacter species Negative Negative    Salmonella species Negative Negative    Vibrio species Negative Negative    Vibrio cholerae Negative Negative    Yersinia enterocolitica Negative Negative    Enteropathogenic E. coli (EPEC) Negative Negative, NA    Shiga-like toxin-producing E. coli (STEC) Negative Negative    Shigella/Enteroinvasive E. coli (EIEC) Negative Negative    Cryptosporidium species Negative Negative    Giardia lamblia Negative Negative    Norovirus Gl/Gll Negative Negative    Rotavirus A Negative Negative    Plesiomonas shigelloides Negative Negative    Enteroaggregative E. coli (EAEC) Negative Negative    Enterotoxigenic E. coli (ETEC) Negative Negative    E. coli O157 NA Negative, NA    Cyclospora cayetanensis Negative Negative    Entamoeba histolytica Negative Negative    Adenovirus F40/41 Negative Negative    Astrovirus Negative Negative    Sapovirus Negative Negative   C. difficile Toxin B PCR with reflex to C. difficile Antigen and Toxins A/B EIA    Collection Time: 10/31/23 12:58 AM    Specimen: Per Rectum; Stool   Result Value Ref Range    C Difficile  Toxin B by PCR Negative Negative   Legionella pneumophila antigen urine    Collection Time: 10/31/23  1:00 AM    Specimen: Urine, Midstream   Result Value Ref Range    Legionella pneumophila serogroup 1 urinary antigen Negative Negative   Streptococcus pneumoniae antigen    Collection Time: 10/31/23  1:00 AM    Specimen: Urine, Midstream   Result Value Ref Range    Streptococcus pneumoniae antigen Negative Negative   Fractional Excretion of Sodium    Collection Time: 10/31/23  1:00 AM   Result Value Ref Range    Creatinine Urine mg/dL 111.0 mg/dL    Sodium Urine mmol/L <20 mmol/L    %FENA     MRSA MSSA PCR, Nasal Swab    Collection Time: 10/31/23  1:02 AM    Specimen: Nares, Bilateral; Swab   Result Value Ref Range    MRSA Target DNA Negative Negative    SA Target DNA Negative    Asymptomatic Influenza A/B, RSV, & SARS-CoV2 PCR (COVID-19) Nasopharyngeal    Collection Time: 10/31/23  1:02 AM    Specimen: Nasopharyngeal; Swab   Result Value Ref Range    Influenza A PCR Negative Negative    Influenza B PCR Negative Negative    RSV PCR Negative Negative    SARS CoV2 PCR Negative Negative   Sodium    Collection Time: 10/31/23  1:45 AM   Result Value Ref Range    Sodium 133 (L) 135 - 145 mmol/L   Creatinine, serum    Collection Time: 10/31/23  1:45 AM   Result Value Ref Range    Creatinine 0.90 0.51 - 0.95 mg/dL   Glucose by meter    Collection Time: 10/31/23  1:56 AM   Result Value Ref Range    GLUCOSE BY METER POCT 154 (H) 70 - 99 mg/dL   CRP inflammation    Collection Time: 10/31/23  5:56 AM   Result Value Ref Range    CRP Inflammation 105.00 (H) <5.00 mg/L   Comprehensive metabolic panel    Collection Time: 10/31/23  5:56 AM   Result Value Ref Range    Sodium 137 135 - 145 mmol/L    Potassium 3.7 3.4 - 5.3 mmol/L    Carbon Dioxide (CO2) 26 22 - 29 mmol/L    Anion Gap 10 7 - 15 mmol/L    Urea Nitrogen 3.7 (L) 6.0 - 20.0 mg/dL    Creatinine 0.83 0.51 - 0.95 mg/dL    GFR Estimate 87 >60 mL/min/1.73m2    Calcium 8.8 8.6 -  10.0 mg/dL    Chloride 101 98 - 107 mmol/L    Glucose 99 70 - 99 mg/dL    Alkaline Phosphatase 207 (H) 35 - 104 U/L    AST 49 (H) 0 - 45 U/L     (H) 0 - 50 U/L    Protein Total 6.3 (L) 6.4 - 8.3 g/dL    Albumin 3.3 (L) 3.5 - 5.2 g/dL    Bilirubin Total 0.2 <=1.2 mg/dL   CBC with platelets and differential    Collection Time: 10/31/23  5:56 AM   Result Value Ref Range    WBC Count 6.3 4.0 - 11.0 10e3/uL    RBC Count 4.02 3.80 - 5.20 10e6/uL    Hemoglobin 10.8 (L) 11.7 - 15.7 g/dL    Hematocrit 33.5 (L) 35.0 - 47.0 %    MCV 83 78 - 100 fL    MCH 26.9 26.5 - 33.0 pg    MCHC 32.2 31.5 - 36.5 g/dL    RDW 16.6 (H) 10.0 - 15.0 %    Platelet Count 249 150 - 450 10e3/uL    % Neutrophils 61 %    % Lymphocytes 27 %    % Monocytes 9 %    % Eosinophils 1 %    % Basophils 1 %    % Immature Granulocytes 1 %    NRBCs per 100 WBC 0 <1 /100    Absolute Neutrophils 3.9 1.6 - 8.3 10e3/uL    Absolute Lymphocytes 1.7 0.8 - 5.3 10e3/uL    Absolute Monocytes 0.6 0.0 - 1.3 10e3/uL    Absolute Eosinophils 0.0 0.0 - 0.7 10e3/uL    Absolute Basophils 0.0 0.0 - 0.2 10e3/uL    Absolute Immature Granulocytes 0.1 <=0.4 10e3/uL    Absolute NRBCs 0.0 10e3/uL   Glucose by meter    Collection Time: 10/31/23  8:52 AM   Result Value Ref Range    GLUCOSE BY METER POCT 97 70 - 99 mg/dL   Glucose by meter    Collection Time: 10/31/23 11:36 AM   Result Value Ref Range    GLUCOSE BY METER POCT 100 (H) 70 - 99 mg/dL          Physical and Psychiatric Examination:     /75   Pulse 100   Temp 97.8  F (36.6  C) (Oral)   Resp 18   SpO2 99%   Weight is 0 lbs 0 oz  There is no height or weight on file to calculate BMI.    Mental Status Exam:    On my interview the patient was on a gurney in the emergency room mood was depressed and affect tearful speech was coherent and goal oriented but the patient did have laryngitis. Associations tight. Thought process was logical and linear. Content of thought without psychosis she has had intermittent suicidal  ideation without intent or plan. Patient alert and oriented x 4.  Recent and remote memory, concentration, fund of knowledge, and use of language were at baseline. Insight and judgement intact.                Assessment   Recurrent major depressive disorder.  Episode moderate as well as anxiety          Plan:   Continue Effexor and add Abilify 2 mg p.o. in the morning.  I note that psychology has already been consulted and believe the patient would benefit from outpatient psychotherapy            Bright Douglas MD

## 2023-10-31 NOTE — PROVIDER NOTIFICATION
"Team notified-  \"pt pain still unmanaged (8/10). reports a persistent headache. also, can you put in zofran orders again? \"  "

## 2023-11-01 LAB
ALBUMIN SERPL BCG-MCNC: 3 G/DL (ref 3.5–5.2)
ALP SERPL-CCNC: 181 U/L (ref 35–104)
ALT SERPL W P-5'-P-CCNC: 69 U/L (ref 0–50)
ANION GAP SERPL CALCULATED.3IONS-SCNC: 11 MMOL/L (ref 7–15)
AST SERPL W P-5'-P-CCNC: 33 U/L (ref 0–45)
BACTERIA UR CULT: NORMAL
BACTERIA UR CULT: NORMAL
BASOPHILS # BLD AUTO: 0 10E3/UL (ref 0–0.2)
BASOPHILS NFR BLD AUTO: 0 %
BILIRUB SERPL-MCNC: <0.2 MG/DL
BUN SERPL-MCNC: 3.1 MG/DL (ref 6–20)
C PNEUM DNA SPEC QL NAA+PROBE: NOT DETECTED
CALCIUM SERPL-MCNC: 8.7 MG/DL (ref 8.6–10)
CALPROTECTIN STL-MCNT: <5 MG/KG (ref 0–49.9)
CHLORIDE SERPL-SCNC: 104 MMOL/L (ref 98–107)
CREAT SERPL-MCNC: 0.72 MG/DL (ref 0.51–0.95)
DEPRECATED HCO3 PLAS-SCNC: 24 MMOL/L (ref 22–29)
EGFRCR SERPLBLD CKD-EPI 2021: >90 ML/MIN/1.73M2
EOSINOPHIL # BLD AUTO: 0.2 10E3/UL (ref 0–0.7)
EOSINOPHIL NFR BLD AUTO: 3 %
ERYTHROCYTE [DISTWIDTH] IN BLOOD BY AUTOMATED COUNT: 16.2 % (ref 10–15)
FLUAV H1 2009 PAND RNA SPEC QL NAA+PROBE: NOT DETECTED
FLUAV H1 RNA SPEC QL NAA+PROBE: NOT DETECTED
FLUAV H3 RNA SPEC QL NAA+PROBE: NOT DETECTED
FLUAV RNA SPEC QL NAA+PROBE: NOT DETECTED
FLUBV RNA SPEC QL NAA+PROBE: NOT DETECTED
GLUCOSE BLDC GLUCOMTR-MCNC: 109 MG/DL (ref 70–99)
GLUCOSE BLDC GLUCOMTR-MCNC: 122 MG/DL (ref 70–99)
GLUCOSE BLDC GLUCOMTR-MCNC: 143 MG/DL (ref 70–99)
GLUCOSE BLDC GLUCOMTR-MCNC: 92 MG/DL (ref 70–99)
GLUCOSE BLDC GLUCOMTR-MCNC: 93 MG/DL (ref 70–99)
GLUCOSE SERPL-MCNC: 90 MG/DL (ref 70–99)
HADV DNA SPEC QL NAA+PROBE: NOT DETECTED
HCOV PNL SPEC NAA+PROBE: NOT DETECTED
HCT VFR BLD AUTO: 32.7 % (ref 35–47)
HGB BLD-MCNC: 10.4 G/DL (ref 11.7–15.7)
HMPV RNA SPEC QL NAA+PROBE: NOT DETECTED
HPIV1 RNA SPEC QL NAA+PROBE: NOT DETECTED
HPIV2 RNA SPEC QL NAA+PROBE: NOT DETECTED
HPIV3 RNA SPEC QL NAA+PROBE: NOT DETECTED
HPIV4 RNA SPEC QL NAA+PROBE: NOT DETECTED
IMM GRANULOCYTES # BLD: 0 10E3/UL
IMM GRANULOCYTES NFR BLD: 1 %
LYMPHOCYTES # BLD AUTO: 1.7 10E3/UL (ref 0.8–5.3)
LYMPHOCYTES NFR BLD AUTO: 31 %
M PNEUMO DNA SPEC QL NAA+PROBE: NOT DETECTED
MAGNESIUM SERPL-MCNC: 1.6 MG/DL (ref 1.7–2.3)
MCH RBC QN AUTO: 27.8 PG (ref 26.5–33)
MCHC RBC AUTO-ENTMCNC: 31.8 G/DL (ref 31.5–36.5)
MCV RBC AUTO: 87 FL (ref 78–100)
MONOCYTES # BLD AUTO: 0.6 10E3/UL (ref 0–1.3)
MONOCYTES NFR BLD AUTO: 11 %
NEUTROPHILS # BLD AUTO: 3 10E3/UL (ref 1.6–8.3)
NEUTROPHILS NFR BLD AUTO: 54 %
NRBC # BLD AUTO: 0 10E3/UL
NRBC BLD AUTO-RTO: 0 /100
PLATELET # BLD AUTO: 236 10E3/UL (ref 150–450)
POTASSIUM SERPL-SCNC: 3.7 MMOL/L (ref 3.4–5.3)
PROT SERPL-MCNC: 5.8 G/DL (ref 6.4–8.3)
RBC # BLD AUTO: 3.74 10E6/UL (ref 3.8–5.2)
RSV RNA SPEC QL NAA+PROBE: NOT DETECTED
RSV RNA SPEC QL NAA+PROBE: NOT DETECTED
RV+EV RNA SPEC QL NAA+PROBE: NOT DETECTED
SODIUM SERPL-SCNC: 139 MMOL/L (ref 135–145)
WBC # BLD AUTO: 5.5 10E3/UL (ref 4–11)

## 2023-11-01 PROCEDURE — 83735 ASSAY OF MAGNESIUM: CPT

## 2023-11-01 PROCEDURE — 80053 COMPREHEN METABOLIC PANEL: CPT

## 2023-11-01 PROCEDURE — 36415 COLL VENOUS BLD VENIPUNCTURE: CPT

## 2023-11-01 PROCEDURE — 90791 PSYCH DIAGNOSTIC EVALUATION: CPT | Mod: HN

## 2023-11-01 PROCEDURE — 250N000013 HC RX MED GY IP 250 OP 250 PS 637: Performed by: PHYSICIAN ASSISTANT

## 2023-11-01 PROCEDURE — 250N000011 HC RX IP 250 OP 636: Mod: JZ | Performed by: STUDENT IN AN ORGANIZED HEALTH CARE EDUCATION/TRAINING PROGRAM

## 2023-11-01 PROCEDURE — 87633 RESP VIRUS 12-25 TARGETS: CPT

## 2023-11-01 PROCEDURE — 120N000002 HC R&B MED SURG/OB UMMC

## 2023-11-01 PROCEDURE — 250N000013 HC RX MED GY IP 250 OP 250 PS 637

## 2023-11-01 PROCEDURE — 250N000011 HC RX IP 250 OP 636: Mod: JZ

## 2023-11-01 PROCEDURE — 99232 SBSQ HOSP IP/OBS MODERATE 35: CPT | Performed by: STUDENT IN AN ORGANIZED HEALTH CARE EDUCATION/TRAINING PROGRAM

## 2023-11-01 PROCEDURE — C9113 INJ PANTOPRAZOLE SODIUM, VIA: HCPCS | Mod: JZ

## 2023-11-01 PROCEDURE — 82962 GLUCOSE BLOOD TEST: CPT

## 2023-11-01 PROCEDURE — 250N000011 HC RX IP 250 OP 636

## 2023-11-01 PROCEDURE — 258N000003 HC RX IP 258 OP 636: Performed by: FAMILY MEDICINE

## 2023-11-01 PROCEDURE — 85025 COMPLETE CBC W/AUTO DIFF WBC: CPT

## 2023-11-01 PROCEDURE — 87486 CHLMYD PNEUM DNA AMP PROBE: CPT

## 2023-11-01 PROCEDURE — 999N000157 HC STATISTIC RCP TIME EA 10 MIN

## 2023-11-01 PROCEDURE — G0463 HOSPITAL OUTPT CLINIC VISIT: HCPCS

## 2023-11-01 RX ORDER — TRIAMCINOLONE ACETONIDE 0.1 %
PASTE (GRAM) DENTAL 2 TIMES DAILY
Status: DISCONTINUED | OUTPATIENT
Start: 2023-11-01 | End: 2023-11-08 | Stop reason: HOSPADM

## 2023-11-01 RX ORDER — ARIPIPRAZOLE 2 MG/1
2 TABLET ORAL DAILY
Status: DISCONTINUED | OUTPATIENT
Start: 2023-11-01 | End: 2023-11-08 | Stop reason: HOSPADM

## 2023-11-01 RX ORDER — MAGNESIUM SULFATE HEPTAHYDRATE 40 MG/ML
2 INJECTION, SOLUTION INTRAVENOUS ONCE
Status: COMPLETED | OUTPATIENT
Start: 2023-11-01 | End: 2023-11-01

## 2023-11-01 RX ORDER — LOPERAMIDE HCL 2 MG
2 CAPSULE ORAL
Status: DISCONTINUED | OUTPATIENT
Start: 2023-11-01 | End: 2023-11-02

## 2023-11-01 RX ADMIN — ENOXAPARIN SODIUM 40 MG: 40 INJECTION SUBCUTANEOUS at 14:53

## 2023-11-01 RX ADMIN — OXYCODONE HYDROCHLORIDE 5 MG: 5 TABLET ORAL at 21:33

## 2023-11-01 RX ADMIN — PIPERACILLIN AND TAZOBACTAM 4.5 G: 4; .5 INJECTION, POWDER, FOR SOLUTION INTRAVENOUS at 09:14

## 2023-11-01 RX ADMIN — ARIPIPRAZOLE 2 MG: 2 TABLET ORAL at 08:48

## 2023-11-01 RX ADMIN — VENLAFAXINE 150 MG: 75 TABLET ORAL at 10:47

## 2023-11-01 RX ADMIN — LOPERAMIDE HYDROCHLORIDE 2 MG: 2 CAPSULE ORAL at 17:30

## 2023-11-01 RX ADMIN — PANTOPRAZOLE SODIUM 40 MG: 40 INJECTION, POWDER, FOR SOLUTION INTRAVENOUS at 08:06

## 2023-11-01 RX ADMIN — OXYCODONE HYDROCHLORIDE 5 MG: 5 TABLET ORAL at 11:42

## 2023-11-01 RX ADMIN — DOXYCYCLINE 100 MG: 100 INJECTION, POWDER, LYOPHILIZED, FOR SOLUTION INTRAVENOUS at 22:33

## 2023-11-01 RX ADMIN — SODIUM CHLORIDE, POTASSIUM CHLORIDE, SODIUM LACTATE AND CALCIUM CHLORIDE 1000 ML: 600; 310; 30; 20 INJECTION, SOLUTION INTRAVENOUS at 01:13

## 2023-11-01 RX ADMIN — ACETAMINOPHEN 650 MG: 325 TABLET, FILM COATED ORAL at 17:30

## 2023-11-01 RX ADMIN — Medication 2 SPRAY: at 11:36

## 2023-11-01 RX ADMIN — PANTOPRAZOLE SODIUM 40 MG: 40 INJECTION, POWDER, FOR SOLUTION INTRAVENOUS at 21:16

## 2023-11-01 RX ADMIN — Medication 2 SPRAY: at 08:05

## 2023-11-01 RX ADMIN — LOPERAMIDE HYDROCHLORIDE 2 MG: 2 CAPSULE ORAL at 11:36

## 2023-11-01 RX ADMIN — MAGNESIUM SULFATE IN WATER 2 G: 40 INJECTION, SOLUTION INTRAVENOUS at 11:33

## 2023-11-01 RX ADMIN — ONDANSETRON 4 MG: 2 INJECTION INTRAMUSCULAR; INTRAVENOUS at 08:22

## 2023-11-01 RX ADMIN — HYDROMORPHONE HYDROCHLORIDE 0.5 MG: 1 INJECTION, SOLUTION INTRAMUSCULAR; INTRAVENOUS; SUBCUTANEOUS at 00:31

## 2023-11-01 RX ADMIN — HYDROMORPHONE HYDROCHLORIDE 0.5 MG: 1 INJECTION, SOLUTION INTRAMUSCULAR; INTRAVENOUS; SUBCUTANEOUS at 22:28

## 2023-11-01 RX ADMIN — PIPERACILLIN AND TAZOBACTAM 4.5 G: 4; .5 INJECTION, POWDER, FOR SOLUTION INTRAVENOUS at 21:24

## 2023-11-01 RX ADMIN — PIPERACILLIN AND TAZOBACTAM 4.5 G: 4; .5 INJECTION, POWDER, FOR SOLUTION INTRAVENOUS at 01:16

## 2023-11-01 RX ADMIN — VENLAFAXINE 150 MG: 75 TABLET ORAL at 21:33

## 2023-11-01 RX ADMIN — HYDROMORPHONE HYDROCHLORIDE 0.5 MG: 1 INJECTION, SOLUTION INTRAMUSCULAR; INTRAVENOUS; SUBCUTANEOUS at 08:13

## 2023-11-01 RX ADMIN — DOXYCYCLINE 100 MG: 100 INJECTION, POWDER, LYOPHILIZED, FOR SOLUTION INTRAVENOUS at 08:03

## 2023-11-01 RX ADMIN — OXYCODONE HYDROCHLORIDE 5 MG: 5 TABLET ORAL at 17:30

## 2023-11-01 RX ADMIN — OXYCODONE HYDROCHLORIDE 5 MG: 5 TABLET ORAL at 04:58

## 2023-11-01 RX ADMIN — PIPERACILLIN AND TAZOBACTAM 4.5 G: 4; .5 INJECTION, POWDER, FOR SOLUTION INTRAVENOUS at 14:53

## 2023-11-01 ASSESSMENT — ACTIVITIES OF DAILY LIVING (ADL)
ADLS_ACUITY_SCORE: 37
ADLS_ACUITY_SCORE: 37
WALKING_OR_CLIMBING_STAIRS_DIFFICULTY: NO
DOING_ERRANDS_INDEPENDENTLY_DIFFICULTY: NO
CONCENTRATING,_REMEMBERING_OR_MAKING_DECISIONS_DIFFICULTY: NO
FALL_HISTORY_WITHIN_LAST_SIX_MONTHS: NO
ADLS_ACUITY_SCORE: 37
ADLS_ACUITY_SCORE: 37
CHANGE_IN_FUNCTIONAL_STATUS_SINCE_ONSET_OF_CURRENT_ILLNESS/INJURY: NO
ADLS_ACUITY_SCORE: 37
HEARING_DIFFICULTY_OR_DEAF: NO
ADLS_ACUITY_SCORE: 20
ADLS_ACUITY_SCORE: 37
ADLS_ACUITY_SCORE: 37
TOILETING_ISSUES: NO
DIFFICULTY_COMMUNICATING: NO
DIFFICULTY_EATING/SWALLOWING: NO
WEAR_GLASSES_OR_BLIND: NO
ADLS_ACUITY_SCORE: 20
ADLS_ACUITY_SCORE: 20
ADLS_ACUITY_SCORE: 37
DRESSING/BATHING_DIFFICULTY: NO
ADLS_ACUITY_SCORE: 37

## 2023-11-01 NOTE — PROGRESS NOTES
Colorectal Surgery Progress Note  [facility]    Subjective:  Patient reports diffuse abdominal pain, having watery J pouch output.  Pt reports continues to pass liquid per anus. Tolerating clear liquids.    Vitals:  Vitals:    10/31/23 0157 10/31/23 0836 10/31/23 1526 11/01/23 0213   BP: 115/65 100/72 111/75 97/60   BP Location: Right arm   Right arm   Pulse:  89 100 83   Resp: 18 18 18 16   Temp: 98.8  F (37.1  C) 98.9  F (37.2  C) 97.8  F (36.6  C) 98.2  F (36.8  C)   TempSrc: Oral Oral Oral Oral   SpO2:  95% 99% 97%     I/O:  I/O last 3 completed shifts:  In: 5098.33 [P.O.:1740; I.V.:3358.33]  Out: 970 [Stool:970]    Physical Exam:  Gen: AAOx3, NAD  Pulm: Non-labored breathing. Continues to have dysphonia/hoarseness of voice.  No cough noted this morning.   Abd: Soft, non-distended, mildly tender on left side.    Stoma pink and viable with think output with few solid bits  Ext:  Warm and well-perfused    BMP  Recent Labs   Lab 11/01/23  0211 10/31/23  2105 10/31/23  1712 10/31/23  1136 10/31/23  0852 10/31/23  0556 10/31/23  0156 10/31/23  0145 10/30/23  2141 10/30/23  1436 10/27/23  2000 10/26/23  1657 10/25/23  1401   NA  --   --   --   --   --  137  --  133*  --  131* 133* 132* 133*   POTASSIUM  --   --   --   --   --  3.7  --   --   --  3.5 4.1 4.9 5.2   CHLORIDE  --   --   --   --   --  101  --   --   --  96* 97* 96* 98   CO2  --   --   --   --   --  26  --   --   --  21* 18* 19* 19*   BUN  --   --   --   --   --  3.7*  --   --   --  3.4* 14.7 13.7 16.2   CR  --   --   --   --   --  0.83  --  0.90  --  0.83 1.01* 0.99* 0.99*   * 142* 202* 100*   < > 99   < >  --    < > 132* 146* 121* 166*   MAG  --   --   --   --   --   --   --   --   --  1.6* 1.6* 1.8 2.0   PHOS  --   --   --   --   --   --   --   --   --   --  3.6  --  3.8    < > = values in this interval not displayed.     CBC  Recent Labs   Lab 10/31/23  0556 10/30/23  1437 10/26/23  1657 10/25/23  1401   WBC 6.3 8.2  12.0* 12.0*   HGB 10.8* 11.3* 13.4 13.6   HCT 33.5* 34.2* 41.5 42.5    294 489* 448         ASSESSMENT: 48 year old female, PMH of UC (possibly 2/2 immunotherapy colitis from history of melanoma) s/p TAC w/ EI in 6/2021 with parastomal hernia, followed by robotic proctectomy, end ileostomy take down, formation of j-pouch and diverting loop ileostomy in 9/2023.  Pt has had a tough post-op course and has had high ileostomy outputs requiring outpatient IV fluids.  Now admitted on 10/30 with 3-4 days of fever, increased ileostomy outputs and continued J-pouch drainage.       COVID, RSV, Flu, Cdiff (ostomy specimen), enteric panel (ostomy specimen), legionella, strep pneumo negative.     - greatly appreciate Medicine and GI management  - strict in and outs of all ileostomy output, J-pouch output, urine  - start home imodium 2mg TID before meals (ordered)  - OK for a regular diet as tolerated from a CRS perspective.   - Fluid resuscitate.   - recommend [facility] for ostomy supplies.     - recommend dietician consult for nutritional status evaluation as pt reports significant weight loss  - is scheduled for ileostomy take down with Dr. Ram in Dec.   - agree with protonix BID    KEITH Blair-C ..................11/1/2023   7:49 AM  Colon and Rectal Surgery    Patient was seen and discussed with Dr. Varela    The above plan of care was performed and communicated to me by Dr. Ram    I spent 35 minute face-to-face or coordinating care of Doretha Yu. Over 50% of our time on the unit was spent counseling the patient and/or coordinating care as documented in the assessment and plan.

## 2023-11-01 NOTE — PROGRESS NOTES
Pt has been alert and oriented X4, VSS on RA, afebrile, up to the BR independently. Pt c/o nausea this am, Zofran given. Pt was able to tolerate breakfast, no emesis reported. Mag 1.6 this am, 2 gm of IV Mg given. Still reported larger Ostomy output than normal. Pt had 350 mL of brown ostomy output so far. Currently being seen by [facility].

## 2023-11-01 NOTE — PROGRESS NOTES
[facility]    Internal Medicine Progress Note - [place] [address] Service    Resident/Fellow Attestation   I, Ale Brasher MD, was present with the medical/ASHANTI student who participated in the service and in the documentation of the note.  I have verified the history and personally performed the physical exam and medical decision making.  I agree with the assessment and plan of care as documented in the note as edited by me.     Ale Brasher MD  PGY2  Date of Service (when I saw the patient): 11/01/23      Main Plans for Today   - Nutrition consult, supplement recs entered  - WOCN for ostomy supplies  - Waiting for 24 hour ileostomy output   - Start loperamide 2mg TID per CR sugery  - Added triamcinolone 0.1% paste for aphthous ulcers   - Started aripiprazole 2mg qam per psychiatry   - Heme contacted for VTE ppx rec    Assessment & Plan   Doretha Yu is a 48 year old female admitted on 10/30/2023. 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 Pt s/p TAC with end ileostomy (laparoscopic) on 6/15/21 with subsequent parastomal hernia now s/p robotic protectectomy, ileostomy takedown, and creation of J-pouch on 9/5/23. 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 Pt presents with fever x 3 days and increased ostomy output x 2 weeks, admitted at recommendation of GI. Pt states that she has had increased ostomy output for the last two weeks. Fever for the last 3 days. Pt states she has had very poor po intake for the last few days. No urinary sx, shortness of breath, cough. No recent travel. Lives with 15 y/o daughter. No recent abx.     Nivolumab induced UC  Increased ostomy output  H/o nivolumab-induced ulcerative colitis s/p TAC with end ileostomy  (laparoscopic) on 6/15/21 with subsequent parastomal hernia now s/p robotic protectectomy, ileostomy takedown, and creation of J-pouch on 9/5/23. Pt states since this time she has noticed increased ostomy output, but in the last two weeks much more so with correlating reduced PO intake. Has been seen in the ED on several occasions and has been getting OP IVF at an infusion center as output so significant. No blood in output. No recent abx.      FLUIDS              - Discontinue mIVF, aim to match with boluses based on I/O to avoid volume overload     WORKUP              - Per GI as below                      - Blood cultures              - C. Diff: Negative              - UA moderate LE and few bacteria, UC pending              - Amylase: WNL              - Urine legionella: Negative   - Enteric Bacteria and Virus: Negative   - Fecal calprotectin pending   - CT enterography: LLL consolidation, surgical changes of total colectomy and completion proctectomy w/R quadrant diverting ileostomy w/o evidence of infectious or inflammatory bowel pathology    ABX              - Zosyn 10/30-*     CONSULTS              - GI consulted, appreciate recs  - Infectious Stool Studies: C. Difficile Toxin B PCR with reflex to C. Difficile Antigen and Toxins A/B EIA, Enteric Bacteria and Virus Panel PCR  - Fecal Calprotectin  - Trend daily labs: CBC and CMP  - CT Enterography for further assessment of bowel, infection/abscess, or other intra-abdominal pathology  - IV Pantoprazole 40 mg BID  - Avoid artificial sugars   - RD nutrition eval  - Strict documentation of I/O  - Defer to CR surgery for post-operative loose stool management   - VTE prophylaxis   - Health psychology consult for coping strategies  - Psychiatry consult for mental health medical management  - [facility]    - Colorectal surgery consulted, appreciate recs   - Strict documentation of I/O  - Dietician consult   - WOCN for ostomy supplies       Potential concomitant pneumonia  CT enterography shows possible infiltrate LLL, well covered with zosyn, will add azithromycin for atypical coverage while undergoing workup. Urine Legionella and Strep as well as respiratory viral panel negative.   - Zosyn as above + doxycycline added x 3 days 500 mg (10/30-11/1)     Hypomagnesemia  Likely 2/2 diarrhea, continues to sit at 1.6 over the past 3 days.   -Magnesium protocol    Aphthous ulcers  Sores on upper lip, lower lip, and L side of tongue. Endorses these occur when she is sick. May be stress induced or another manifestation of her autoimmune illnesses.   - Consult dental  - Lidocaine swish and spit   - Triamcinolone paste    Hyponatremia  Likely hypovolemic hyponatremia in setting of increased UOP. To be complete can get baseline studies. Urine Legionella negative. Osmolality decreased in blood 271 and urine 86.   - Resolved      Chronic Transaminitis  - Pending above workup  - Hepatitis panel negative      Depression  Anxiety  Patient endorses struggle coping with chemotherapy-induced autoimmune diseases that have led to GI surgeries and complications. Depression and anxiety are uncontrolled even on EFFEXOR (generic venlafaxine is refused). Has asked for help from psych when talking to GI.   - Currently PTA venlafaxine 150mg BID  - Consult psychology for coping strategies and support  - Consult psychiatry with help with meds, recs appreciated   - Add aripiprazole 2mg po qam      DM2  A1c 6.5% last 10/25. Takes metformin 500 BID as OP.   - Hold metformin  - LDSSI     Hypothyroidism  - Do not see PTA meds on list, can ask if she takes in AM     Normocytic Anemia  Baseline Hb 13, here with Hb of 11.3. Plts wnl.      STORMY  - CPAP     Chronic Pain  - APAP  - oxycodone 5 mg q 4  - Dilaudid 0.2-0.5 q 3 for short term only     Melanoma history:   - see H&P subjective section for full history, follows with [facility].     DISCHARGE PLANNING:     NEW MEDS:   -    MEDS TO  STOP/HOLD:   -    FOLLOWUP:   -    Diet: Full Liquid Diet  Fluids: 125cc/hr  Lines: PIV  Major Catheter: Not present    DVT Prophylaxis: Enoxaparin (Lovenox) SQ  Code Status: Full Code     Seven Pka  Medical Student (MS3)  [employer]    Pt staffed with Dr. Enoc Robledo MD who agrees with this plan.     Kathy Brasher DO   Internal Medicine PGY2  [employer]  Please use on call sytem for paging    Interval History   Doretha was interviewed in her room in the ED. Her hoarseness has improved but this attributes this to a small vomitus before I came in. She received water from her nurse and will wait to try expectorating sputum for culture again. She has not been able to produce over the past day. States that her oral ulcers continue to be painful despite rinse. Informed that steroid cream she used in the past was found and ordered by team.     She notes that her abdominal pain has moved from left sided to now being across her lower ribs bilaterally; however, upon inspiration, the left side continues to hurt more. She does think her cough is worse but not sure about dyspnea and notes that she has not required oxygen consistently which is a good sign.     The team came back later to speak with her and her father who is visiting. They express concern that she has been discharged too soon in the past and their frustration nothing has been done to fix her output. They are told that we would like to wait for 24 hours of strict I/O before we make more medication recommendations. They are agreeable to this. Point out that Doretha has been getting hang nails which has not happened before. She does not have any rashes. We explain that this could be a product of stress from her current sickness or due to autoimmune condition similar to the oral ulcers. We will keep an eye on them as they are not currently bothering her.     Physical Exam   Vital Signs: Temp: 98.2  F (36.8  C) Temp src:  Oral BP: 97/60 Pulse: 83   Resp: 16 SpO2: 97 % O2 Device: Nasal cannula Oxygen Delivery: 1 LPM  Weight: 0 lbs 0 oz    General: Pt laying comfortably in bed, in no acute distress. Not requiring oxygen.   Cardio: Regular rate and rhythm. No murmurs, gallops, rubs.   Pulm: LLL crackles that have improved from yesterday, other lung fields clear.   Abd: Active bowel sounds. Tenderness to palpation in RUQ and LUQ with rebound but no gaurding.   HEENT: White ulcers on upper and lower lip ~1cm, 0.5cm bilateral tongue   MSK: Erythematous presumed hang nail lesions on several fingers of both hands   Lower extremities: No lower extremity edema present bilaterally.   Neuro: Alert and oriented to person, place, and time.   Psych: Appropriate mood and affect.       Data   Recent Labs   Lab 11/01/23  0211 10/31/23  2105 10/31/23  1712 10/31/23  0852 10/31/23  0556 10/31/23  0156 10/31/23  0145 10/30/23  2141 10/30/23  1437 10/30/23  1436 10/27/23  2000 10/26/23  1657   WBC  --   --   --   --  6.3  --   --   --  8.2  --   --  12.0*   HGB  --   --   --   --  10.8*  --   --   --  11.3*  --   --  13.4   MCV  --   --   --   --  83  --   --   --  83  --   --  83   PLT  --   --   --   --  249  --   --   --  294  --   --  489*   INR  --   --   --   --   --   --   --   --   --  1.07  --   --    NA  --   --   --   --  137  --  133*  --   --  131* 133* 132*   POTASSIUM  --   --   --   --  3.7  --   --   --   --  3.5 4.1 4.9   CHLORIDE  --   --   --   --  101  --   --   --   --  96* 97* 96*   CO2  --   --   --   --  26  --   --   --   --  21* 18* 19*   BUN  --   --   --   --  3.7*  --   --   --   --  3.4* 14.7 13.7   CR  --   --   --   --  0.83  --  0.90  --   --  0.83 1.01* 0.99*   ANIONGAP  --   --   --   --  10  --   --   --   --  14 18* 17*   PATRICIA  --   --   --   --  8.8  --   --   --   --  8.8 9.7 10.4*   * 142* 202*   < > 99   < >  --    < >  --  132* 146* 121*   ALBUMIN  --   --   --   --  3.3*  --   --   --   --  3.8 3.9 4.8    PROTTOTAL  --   --   --   --  6.3*  --   --   --   --  7.1 7.3 8.4*   BILITOTAL  --   --   --   --  0.2  --   --   --   --  0.2 0.2 0.3   ALKPHOS  --   --   --   --  207*  --   --   --   --  258* 234* 259*   ALT  --   --   --   --  104*  --   --   --   --  146* 147* 197*   AST  --   --   --   --  49*  --   --   --   --  92* 73* 90*    < > = values in this interval not displayed.       Imaging results reviewed over the past 24 hrs:   No results found for this or any previous visit (from the past 24 hour(s)).

## 2023-11-01 NOTE — PLAN OF CARE
Goal Outcome Evaluation:    5004-4081  BP 97/60 (BP Location: Right arm)   Pulse 83   Temp 98.2  F (36.8  C) (Oral)   Resp 16   SpO2 97%     Pt. A&O, on RA, VSS. C/o of Abd pain managed with PRN Oxy and Dilaudid for relieved. Up independent to bathroom to empty ostomy. PIV infusing LR @ 125 ml. Bg q4h, 2am 143.On full liquid diet, denies nausea. Has urostomy in place, voiding spontaneously. No acute change, continue w/poc

## 2023-11-01 NOTE — PROGRESS NOTES
Care Management Follow Up    Length of Stay (days): 2    Expected Discharge Date: 11/02/2023     Concerns to be Addressed:       Patient plan of care discussed at interdisciplinary rounds: No    Anticipated Discharge Disposition:       Anticipated Discharge Services:    Anticipated Discharge DME:      Patient/family educated on Medicare website which has current facility and service quality ratings:    Education Provided on the Discharge Plan:    Patient/Family in Agreement with the Plan:      Referrals Placed by CM/SW:    Private pay costs discussed: Not applicable    Additional Information:  RNCC was updated by [facility] liaison that patient was on IV hydration, normal saline, 1L bolus, three times/week via PIV prior to admission. RNCC reached out to Medicine [place] [address] team to update if will still need. If patient still needs IV hydration, will need orders/update placed to [facility] at discharge time.     Update: probable to resume HI services per [place] [address] resident, orders placed, [facility] updated, will need to update if any changes needed.    [facility]  Phone # [phone]  Fax # [phone]       YUMIKO DowningN, BA, RN, CMSRN, RNCC  [facility]  Covering Units 6C Beds 5549-0259/OBS  Phone: [phone]  Pager: [phone]  After Hours [phone]    6C Beds 7009-9670  Ph: [phone]  6C Beds 4011-1313  pager: [phone]    6B/C/D RNCC Weekend/holiday pager: [phone]    6A/ICU RNCC Weekend/holiday pager: [phone]    7A/7B/7C/7D RNCC Weekend/holiday pager: [phone]    5A/B/C RNCC Weekend/holiday pager: [phone]    [facility] RNCC ED/5 Ortho/5 Med/Surg [phone]    [facility] RNCC 6 Med/Surg 8A, [phone]    Observation SW and weekend/after hours phone: [phone]

## 2023-11-01 NOTE — PROGRESS NOTES
CLINICAL NUTRITION SERVICES - ASSESSMENT NOTE     Nutrition Prescription    RECOMMENDATIONS FOR MDs/PROVIDERS TO ORDER:  Recommend short term peripheral parenteral nutrition support to help support nutrition with high ostomy output despite attempted diet changes and anti-diarrheal medications. Paged CRS provider.     Malnutrition Status:    Severe malnutrition in the context of chronic illness/disease    Recommendations already ordered by Registered Dietitian (RD):  Provided education on eating with ileostomy. Provided handout and education including foods recommended, foods not recommended, recommended changes to diet/eating, oral rehydration options, and oral nutrition supplement recommendations (avoid supplements with artifical sweeteners). Pt reports she is doing most of these options and what foods she eats doesn't make a difference in her output. Discussed oral rehydration options including recipes and Pedialyte. Pt reports other provider (suspect GI) looked up Gatorlyte and told her that would be OK option. Looked up sugar free liquid IV with patient and that would also OK option.   Pt reports she is significantly struggling with nutrition at home because of her high output and how tired she has been. Pt reports she doesn't feel she has the energy to make oral rehydration solutions or prepare meals. She reports constantly emptying ostomy bag and has no energy. Discussed option of temporary IV nutrition. Discussed this would need to be approved through her provider. Explained PN does not utilize the GI system in the same way eating does so would likely provide her energy and hydration with better absorption. Discussed she can continue to eat when on PN.   Trial of [place] ONS and unflavored protein powder with meals  Trial of Banatrol 1 pkt TID (soluble fiber made of banana flakes and prebiotics to solidify stool and relieve diarrhea). Oral instructions: Mix with 4 oz juice, water, or yogurt, applesauce and  take orally. Each packet provides: 40 kcal, 10 g carbohydrates, 9 mg Phosphorus, 125 mg Potassium.     Future/Additional Recommendations:  If POC is to initiate short-term peripheral parenteral nutrition:  Recommend only using Peripheral PN for short-term nutrition support, ~1-2 week duration  If anticipated that patient will need PN support for >2 weeks, consider central line placement.   Use dosing weight 58.7 kg (adjusted weight)  ClinimixE 4.25% AA, 5% dextrose concentration (peripheral line) -Provides 675 mOsm/L (appropriate within limitations of PPN)  PPN at rate of 83 mL/hr (1992 mL/day) to provide 100g Dex daily (340 kcal, GIR 0.8 mg/kg actual wt/min), 85 g AA daily (340 kcal), and 250 ml 20% IV lipids 5-days per week (M-F) for total provision of 1037 kcals (17 kcal/kg/day), 1.4 g PRO/kg/day, and 34% kcals from Fat.       REASON FOR ASSESSMENT  Doretha Yu is a/an 48 year old female assessed by the dietitian for Provider Order - Rec from GI and CR surgery, [facility] multiple GI surgeries and autoimmune disease, reports GF diet, recent weight loss    Patient admitted for new fever, dehydration, weakness and increased ostomy outputs since most recent colorectal surgery 9/5/2023, worsened over the last 3 weeks.  Negative c dif, enteric bacteria, virus panel PCR. Per GI 10/31 - Acute Clinical picture seems most consistent with an acute likely viral illness that has evolved to a pneumonia given reported 2-3 week worsening of subacute loose stools, acute on chronic nausea, malaise, new productive cough and voice hoarseness. Infectious illness likely exacerbating loose stools and contributing to increase ostomy and rectal output. Agree with pneumonia treatment per primary team, keeping in mind that IV antibiotics can lead to gut dysbiosis and contribute to loose stools. Additionally unclear how much small bowel remains and with increased loose stools since recent colorectal surgery with known loop ileostomy, may have a  component of bile acid malabsorption contributing to loose stools.     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 Ram;     Per GI note 10/31:   - Patient reports following a Gluten Free diet for possible Celiac Disease. Per chart review, previous duodenal biopsies have been negative biopsies for celiac disease.    - Consider trial of Pedialyte for PO hydration.   - Avoid all artificial sugars from the diet: Aspartame, Sucralose, Acesulfame K, Saccharin, Xylitol.    Per CRS team, pt started on Loperamide QID and Lomotil BID 10/10/23. 10/25 CRS team recommended Imodium BID and to start fiber 2 tbsp BID maximum TID. Per CRS team, patient is scheduled for ileostomy takedown in December.     NUTRITION HISTORY  Pt reports ostomy output of about 1600 mL daily. She reports trying to eat stool thickening foods but it doesn't make a difference in her output. She has mostly only had soup once daily the past week or so d/t being too fatigued to manage additional meals and d/t her output being unmanageable. She reports not noticing any difference with her output after starting anti-diarrheal medications. She was drinking Gatorlyte and Liquid IV and vitamin water.     CURRENT NUTRITION  "ORDERS  Diet: Full Liquid    Intake/Tolerance: Intake of 50 % of 1 meal(s) so far.    LABS  Hgb A1c 6.5 (H) 10/25/23   10/31/23 05:56 11/01/23 07:01   Sodium 137 139   Potassium 3.7 3.7   Chloride 101 104   Carbon Dioxide (CO2) 26 24   Urea Nitrogen 3.7 (L) 3.1 (L)   Creatinine 0.83 0.72   GFR Estimate 87 >90   Calcium 8.8 8.7   Anion Gap 10 11   Magnesium  1.6 (L)   Albumin 3.3 (L) 3.0 (L)   Protein Total 6.3 (L) 5.8 (L)   Alkaline Phosphatase 207 (H) 181 (H)    (H) 69 (H)   AST 49 (H) 33   Bilirubin Total 0.2 <0.2   CRP Inflammation 105.00 (H)    Glucose 99 90       MEDICATIONS  Artifical saliva  Novolog insulin  Imodium TID before meals  Protonix BID  Zosyn   PRN magic mouth wash, zofran     ANTHROPOMETRICS  Height: 157.5 (5' 2\")  Most Recent Weight:      IBW: 50 kg  BMI: 34.20 kg/m2; BMI Category: Obesity Grade I BMI 30-34.9  Weight History:  10.9 kg (11.4%) weight loss over 5 months. 21.8 kg (20.4%) weight loss over 1 year.  Wt Readings from Last 20 Encounters:   10/26/23 84.8 kg (187 lb)   10/23/23 85.3 kg (188 lb)   10/10/23 87.7 kg (193 lb 4.8 oz)   09/14/23 94.8 kg (209 lb)   09/13/23 93.9 kg (207 lb)   09/05/23 95.2 kg (209 lb 14.1 oz)   08/22/23 93.4 kg (206 lb)   07/11/23 92.5 kg (204 lb)   06/13/23 95.7 kg (211 lb)   03/29/23 99.8 kg (220 lb)   02/24/23 99.8 kg (220 lb)   01/13/23 104.6 kg (230 lb 8 oz)   01/10/23 99.8 kg (220 lb)   11/28/22 103.4 kg (228 lb)   11/11/22 106.6 kg (235 lb)   09/23/22 111.1 kg (245 lb)   09/20/22 112.7 kg (248 lb 6.4 oz)   09/10/22 112 kg (247 lb)   05/18/22 113.4 kg (250 lb)   02/28/22 111.4 kg (245 lb 8 oz)         ASSESSED NUTRITION NEEDS  Dosing Weight: 58.7 kg (Adjusted BW)   Estimated Energy Needs: 1348-2706 kcals/day (30 - 35 kcals/kg )  Justification: Increased needs, likely malabsorption with high ostomy output  Estimated Protein Needs: 70-88 grams protein/day (1.2 - 1.5 grams of pro/kg)  Justification: Increased needs  Estimated Fluid Needs: 0276-9053 " mL/day (1 mL/kcal)   Justification: Increased needs, likely malabsorption with high ostomy output    PHYSICAL FINDINGS  See malnutrition section below.    Lv Score: 19  Per EMR: Skin  Skin WDL: WDL  Skin WDL: WDL    MALNUTRITION  % Intake: </=75% for >/= 1 month (severe)  % Weight Loss: > 10% in 6 months (severe malnutrition)  Subcutaneous Fat Loss: Facial region:  mild   Muscle Loss: Temporal:  mild and Thoracic region (clavicle, acromium bone, deltoid, trapezius, pectoral):  mild  Fluid Accumulation/Edema: None noted  Malnutrition Diagnosis: Severe malnutrition in the context of chronic illness/disease    NUTRITION DIAGNOSIS  Altered GI function related to multiple GI surgeries including reation of ileal J-pouch with ileoanal anastomosis, and diverting loop ileostomy as evidenced by high ostomy output, weight loss, patient report.       INTERVENTIONS  Implementation  Nutrition Education: Provided education on nutrition with ileostomy    Collaboration with other providers  Medical food supplement therapy     Goals  Total avg nutritional intake to meet a minimum of 30 kcal/kg and 1.2 g PRO/kg daily (per dosing wt 58.7 kg).        Monitoring/Evaluation  Progress toward goals will be monitored and evaluated per protocol.    Valery Mackay RDN, LD    6C (beds 1440-8342) + 7C (beds 8566-2345) + ED   RD pager: [phone]  Weekend/Holiday RD pager: [phone]

## 2023-11-01 NOTE — CONSULTS
HEALTH PSYCHOLOGY INPATIENT CONSULT PSYCHODIAGNOSTIC ASSESSMENT     Health Psychology Office   Health Psychology Clinic   Department of Medicine   [place] [address] [facility]  3rd Floor  [address] [place] [address]     Health Psychology Team:  Matilde Blanca, Ph.D., L.P [phone]  Vonda Mckeon, Ph.D., L.P. [phone]  Messi Dockery, Ph.D. [phone]  Annamarie Campos, Ph.D., A.B.P.P., L.P. [phone]  Bright Kang, Ph.D., A.B.P.P., L.P. [phone]         Liana Wade, Ph.D., L.P. [phone]   Radha Davis, Ph.D., A.B.P.P., L.P. [phone]  Leonard Lyon, Ph.D. [phone]    Confidential Summary of Inpatient Health Psychology Consultation*    Sources of Information:  Information was obtained from a clinical interview with the patient and review of medical record.    Referral Source/Reason:  Ms. Yu was referred to Health Psychology by Ale Brasher MD for coping with current medical concerns.     History of Presenting Concerns:  Ms. Yu is a 48 year old y/o female currently inpatient for 2 days, starting on 10/30/2023 for High output ileostomy (H) [R19.8, Z93.2]. Pt reported sxs of sadness and frustration regarding current physical health. Pt reported that she has been chronically sick on and off for ~5 years, around the time of her cancer dx and treatment. Pt stated that she has recently been feeling unwell but felt that her concerns were not taken seriously until she became hospitalized for pain and GI sxs in that past 2 days. Pt stated that it has been challenging to process her current pain sxs (8 out of 10) and emotions. Writer engaged pt in cognitive processing of current emotions and situation. Writer also provided pt with basic psychoeducation on emotion/pain coping and services provided by health psychology. Pt indicated interest in continuing to meet with health  psychology during inpatient stay and then outpatient after discharge.     Patient Demographics (per chart):   Age: 48 year old  Biological Sex: female  Race: White  Ethnicity: Not  or     Hospital Admission (per chart):  Admission Date: 10/30/2023  Admission Diagnosis: High output ileostomy (H) [R19.8, Z93.2]  Length of Stay: 2    Medical History:  See below lists for past medical history, past surgical history, and current medications    Past Medical History:   Diagnosis Date    Abnormal MRI     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     Anxiety     Basal cell carcinoma     Cervical high risk HPV (human papillomavirus) test positive 2019    See problem list    Colitis     Depression     Diabetes mellitus, iatrogenic (H) 2020    Esophageal reflux     Inflammatory arthritis     Insomnia     Intestinal giardiasis 2018    Lumbago     left lower back pain    Lymphedema     Malignant melanoma (H)     Melanoma (H) 10/23/2017    Migraines     Mild persistent asthma     Morbid obesity with BMI of 40.0-44.9, adult (H)     STORMY (obstructive sleep apnea)     Prothrombin deficiency (H)     takes 81mg asa daily    Stroke (cerebrum) (H)     During     TIA (transient ischemic attack)     Type 2 diabetes mellitus (H)     Ulcerative pancolitis (H)        Past Surgical History:   Procedure Laterality Date    APPENDECTOMY      COLONOSCOPY N/A 10/18/2017    Procedure: COLONOSCOPY;  Colon;  Surgeon: Debbie Stephens MD;  [facility]    COLONOSCOPY N/A 2018    Procedure: COMBINED COLONOSCOPY, SINGLE OR MULTIPLE BIOPSY/POLYPECTOMY BY BIOPSY;  colon;  Surgeon: Benita Schumacher MD;  Location: [facility]    COLONOSCOPY      multiple since  to present - about 6 total    DAVINCI ASSISTED TRANSANAL TOTAL MESORECTAL EXCISION N/A 2023    Procedure: COMPLETION PROCTECTOMY, ROBOT-ASSISTED, ILEAL POUCH ANASTAMOSIS;  Surgeon: Quinton Ram MD;  Location: [facility]     DISSECT LYMPH NODE AXILLA Left 10/23/2017    Procedure: DISSECT LYMPH NODE AXILLA;  Left Axillary Lymph Node Dissection ;  Surgeon: Laurent Cool MD;  Location: [facility]    EXAM UNDER ANESTHESIA PELVIC N/A 2020    Procedure: EXAM UNDER ANESTHESIA, PELVIS; with Cervical Biopsies, Vaginal Biopsy and Endocervical Curettings;  Surgeon: Melina Jung MD;  Location: [facility]    GYN SURGERY  ,         LAPAROSCOPIC ASSISTED COLECTOMY N/A 06/15/2021    Procedure: laparoscopic total abdominal colectomy, end ileostomy;  Surgeon: Quinton Ram MD;  Location: [facility]    REPAIR MOHS Left 2017    Procedure: REPAIR MOHS;  Left Upper Lid Moh's Reconstruction;  Surgeon: Kisha Bosch MD;  [facility]    SIGMOIDOSCOPY FLEXIBLE N/A 2023    Procedure: Sigmoidoscopy flexible;  Surgeon: Quinton Ram MD;  Location: [facility]       Current Facility-Administered Medications   Medication    acetaminophen (TYLENOL) tablet 650 mg    Or    acetaminophen (TYLENOL) Suppository 650 mg    albuterol (PROVENTIL HFA/VENTOLIN HFA) inhaler    ARIPiprazole (ABILIFY) tablet 2 mg    artificial saliva (BIOTENE MT) solution 2 spray    glucose gel 15-30 g    Or    dextrose 50 % injection 25-50 mL    Or    glucagon injection 1 mg    doxycycline (VIBRAMYCIN) 100 mg vial to attach to  mL bag    enoxaparin ANTICOAGULANT (LOVENOX) injection 40 mg    HYDROmorphone (PF) (DILAUDID) injection 0.5 mg    [Held by provider] hydrOXYzine (ATARAX) tablet 25 mg    insulin aspart (NovoLOG) injection (RAPID ACTING)    insulin aspart (NovoLOG) injection (RAPID ACTING)    lidocaine (LMX4) cream    lidocaine (viscous) (XYLOCAINE) 2 % solution 5 mL    loperamide (IMODIUM) capsule 2 mg    magic mouthwash suspension (diphenhydramine, lidocaine, aluminum-magnesium & simethicone)    melatonin tablet 3 mg    [Held by provider] methocarbamol (ROBAXIN) tablet 750 mg    ondansetron (ZOFRAN) injection 4 mg    oxyCODONE  "(ROXICODONE) tablet 5 mg    pantoprazole (PROTONIX) IV push injection 40 mg    piperacillin-tazobactam (ZOSYN) 4.5 g vial to attach to  mL bag    sodium chloride (PF) 0.9% PF flush 3 mL    sodium chloride (PF) 0.9% PF flush 3 mL    venlafaxine (EFFEXOR) tablet 150 mg     Current Outpatient Medications   Medication    acetaminophen (TYLENOL) 500 MG tablet    albuterol (PROAIR HFA/PROVENTIL HFA/VENTOLIN HFA) 108 (90 Base) MCG/ACT inhaler    Cyanocobalamin (B-12 PO)    hydrOXYzine (ATARAX) 25 MG tablet    loperamide (IMODIUM A-D) 2 MG tablet    medical cannabis (Patient's own supply)    metFORMIN (GLUCOPHAGE) 500 MG tablet    ondansetron (ZOFRAN ODT) 4 MG ODT tab    venlafaxine (EFFEXOR) 75 MG tablet    diphenoxylate-atropine (LOMOTIL) 2.5-0.025 MG tablet    famotidine (PEPCID) 20 MG tablet    methocarbamol (ROBAXIN) 750 MG tablet    Ostomy Supplies MISC    phentermine (ADIPEX-P) 15 MG capsule     Facility-Administered Medications Ordered in Other Encounters   Medication    lidocaine 1 % 9 mL    sodium bicarbonate 8.4 % injection 1 mEq       Social History:  Current Living Arrangement: Pt reported that she currently lives at home with her 17 y/o daughter    Relationship Status/Family: Pt denied a current romantic relationship. Pt stated that she has a 17 y/o daughter and 20 y/o son.    Social Support: Pt denied a social support network stating that she has \"always been the caretaker,\" for others including her children, siblings, and father.    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    Symptoms of Anxiety: Pt reported feeling stressed and worried about current health and her children    Patient denied sxs associated with panic, obsessive-compulsive disorder, or " agoraphobia.    Symptoms of Attention: Pt denied hx of/current attention-related symptoms.   Symptoms of Psychosis:  Pt denied hx of/current psychotic symptoms.   Symptoms of Sima: Pt denied hx of/current manic symptoms.   Symptoms of Disordered Eating: Pt denied hx of/current disordered eating symptoms.   History of self-harm or suicide attempts: Pt denied hx of/current self-harm or suicidal ideation/attempts.    History of Mental Health Treatment: Pt reported hx of/current mental health treatment. Pt reported a hx of working with a therapist but denied current utilization.    History of Psychiatric Hospitalization:  Pt denied hx of psychiatric hospitalization.      Mental Status/Interview:  Appearance:   Appropriate   Eye Contact:   Good   Psychomotor Behavior: Normal   Attitude:   Cooperative   Orientation:   All  Speech   Rate / Production: Normal    Volume:  Normal   Mood:    Normal  Affect:    Appropriate   Thought Content:   Clear  Thought Form:  Coherent  Logical     Insight:    Did not formally assess at this time.     Suicidal ideation: Pt denied active suicidal ideation.   Homicidal ideation: Pt denied active homicidal ideation.     [place] Suicide Severity Rating Scale Screener (C-SSRS) Past Month  Always ask questions 1 and 2 Past month   1. Wish to be dead (Have you wished you were dead or wished you could go to sleep and not wake up?) No   2. Non-specific active suicidal thoughts (Have you actually had any thoughts of killing yourself?) No   If YES to 2, ask questions 3, 4, 5 and 6.  If NO to 2, skip to question 6.    3. Active suicidal ideation with any methods without intent to act (Have you been thinking about how you might do this?)    4. Active Suicidal Ideation with Some Intent to Act, without Specific Plan (Have you had these thoughts and had some intention of acting on them?)    5. Active Suicidal Ideation with Specific Plan and Intent (Have you started to work out or worked out the details  of how to kill yourself? Did you intend to carry out this plan?)    Always ask question 6    6a. (Have you done anything, started to do anything, or prepared to do anything to end your life?) No   6b. (Was this within the past 3 months?)    KEREN Bradley, et al., (2008). [place]-suicide severity rating scale (C-SSRS). [place], [place]: [facility], 10, 2008.    Impression/Plan:  Ms. Yu is a 48 year old y/o female currently inpatient for 2 days, starting on 10/30/2023 for High output ileostomy (H) [R19.8, Z93.2] who presented today in hospital room. Patient was engaged in the visit and expressed understanding of information provided. Pt presented with symptoms of depression and anxiety secondary to current medical illness. Pt symptoms are likely maintained by hospitalization and minimal social support. Symptom reduction would likely be facilitated by CBT for adjusting/coping with medical illness. Pt was agreeable to follow up with Health Psychology Team. Plan for health psychology to follow this patient approximately once per week for the duration of their hospitalization. Please feel free to call in urgent concerns arise prior to the next follow-up session.    Recommendations for Care Team:  Continue to monitor changes in patient's mood. While not an exhaustive list, examples of symptoms of note include increases in: sadness/hopelessness, time spent worrying, physiological responses to stress, and decreases in: sleep, appetite, socialization, engagement in care. Please continue to engage pt in conversation regarding social supports.     Diagnosis:  Adjustment disorder with mixed anxiety and depressed mood (ICD-10: F43.20)    CPT Code:  [place] PSYCHIATRIC DIAGNOSTIC EVALUATION [1359357]    Session Length:  Start Time: 11:00am  End Time: 11:30am    Date of Service:  11/01/23    Leonard Lyon, PhD  Clinical Health Psychology Fellow  Phone: [phone]  Pager: [phone]    This case is being  supervised by Annamarie Campos, PhD, , ABPP.    This note was completed using Dragon voice recognition software. Although reviewed after completion, some word and grammatical errors may occur.    *In accordance with the Rules of the [place] Board of [employer], it is noted that psychological descriptions and scientific procedures underlying psychological evaluations have limitations.  Absolute predictions cannot be made based on information in this report.

## 2023-11-01 NOTE — CONSULTS
[facility]  WOC Nurse Inpatient Assessment     Consulted for: diverting loop ileostomy       Patient History (according to provider note(s):      48 year old female, PMH of UC (possibly 2/2 immunotherapy colitis from history of melanoma) s/p TAC w/ EI in 6/2021 with parastomal hernia, followed by robotic proctectomy, end ileostomy take down, formation of j-pouch and diverting loop ileostomy in 9/2023.  Pt has had a tough post-op course and has had high ileostomy outputs requiring outpatient IV fluids.  Now admitted on 10/30 with 3-4 days of fever, increased ileostomy outputs and continued J-pouch drainage.     Assessment:      Areas visualized during today's visit: Abdomen    Assessment of Established loop Ileostomy:  How long has patient had ostomy: 9/2023  Stoma: retracted  Mucutaneous junction: not visualized (barrier in place)  Peristomal skin: none   Output: watery, liquid, and undigested food     Is patient independent with ostomy care?: Yes  Home pouching system: deep convex coloplast fecal pouch with silvia ring, brava strips and belt    Pouching issues and/or educational needs: significant output causing difficulty sleeping due to need to empty all the time   Interventions completed today: Pouching system assessment   Pouching system in use while hospitalized:  Coloplast one piece, convex, barrier ring, and ostomy strips  Supplies location: gathered and at bedside     Met with patient, stating having to get up every half hour to empty leading to lack of sleep and exhaustion, brought patient samples of high output pouches that connect to bedside drainage bag, patient is interested in trying them, however, wants to wait until has a room and is not in the ED to change pouch, WOC will return tomorrow for re-assessment, gave patient a few extra pouches of usual supplies in case any leaks occur overnight     Treatment Plan:     Patient given deep convex coloplast fecal  pouches with silvia rings and comfeel to use as brava strips, WO will re-assess tomorrow     Orders: Written    RECOMMEND PRIMARY TEAM ORDER: None, at this time  Education provided: plan of care  Discussed plan of care with: Patient  WOC nurse follow-up plan:  tomorrow  Notify WOC if wound(s) deteriorate.  Nursing to notify the Provider(s) and re-consult the WOC Nurse if new skin concern.    DATA:     Current support surface: Standard  Standard gel/foam mattress (IsoFlex, Atmos air, etc)  Containment of urine/stool: Ileostomy pouch  BMI: There is no height or weight on file to calculate BMI.   Active diet order: Orders Placed This Encounter      Full Liquid Diet     Output: I/O last 3 completed shifts:  In: 2589.58 [P.O.:1350; I.V.:1239.58]  Out: 1120 [Stool:1120]     Labs:   Recent Labs   Lab 11/01/23  0701 10/30/23  1437 10/30/23  1436   ALBUMIN 3.0*   < > 3.8   HGB 10.4*   < >  --    INR  --   --  1.07   WBC 5.5   < >  --     < > = values in this interval not displayed.     Pressure injury risk assessment:   Sensory Perception: 3-->slightly limited  Moisture: 4-->rarely moist  Activity: 3-->walks occasionally  Mobility: 3-->slightly limited  Nutrition: 3-->adequate  Friction and Shear: 3-->no apparent problem  Lv Score: 19      Pager no longer is use, please contact through Patricia Gomez group: WO Nurse [place]   Dept. Office Number: [phone]

## 2023-11-02 LAB
ALBUMIN SERPL BCG-MCNC: 3.4 G/DL (ref 3.5–5.2)
ALP SERPL-CCNC: 193 U/L (ref 35–104)
ALT SERPL W P-5'-P-CCNC: 68 U/L (ref 0–50)
ANION GAP SERPL CALCULATED.3IONS-SCNC: 11 MMOL/L (ref 7–15)
AST SERPL W P-5'-P-CCNC: 29 U/L (ref 0–45)
BASOPHILS # BLD AUTO: 0 10E3/UL (ref 0–0.2)
BASOPHILS NFR BLD AUTO: 1 %
BILIRUB SERPL-MCNC: <0.2 MG/DL
BUN SERPL-MCNC: 3.5 MG/DL (ref 6–20)
CALCIUM SERPL-MCNC: 9.1 MG/DL (ref 8.6–10)
CHLORIDE SERPL-SCNC: 103 MMOL/L (ref 98–107)
CREAT SERPL-MCNC: 0.79 MG/DL (ref 0.51–0.95)
DEPRECATED HCO3 PLAS-SCNC: 26 MMOL/L (ref 22–29)
EGFRCR SERPLBLD CKD-EPI 2021: >90 ML/MIN/1.73M2
EOSINOPHIL # BLD AUTO: 0.2 10E3/UL (ref 0–0.7)
EOSINOPHIL NFR BLD AUTO: 4 %
ERYTHROCYTE [DISTWIDTH] IN BLOOD BY AUTOMATED COUNT: 16.2 % (ref 10–15)
GLUCOSE BLDC GLUCOMTR-MCNC: 101 MG/DL (ref 70–99)
GLUCOSE BLDC GLUCOMTR-MCNC: 107 MG/DL (ref 70–99)
GLUCOSE BLDC GLUCOMTR-MCNC: 115 MG/DL (ref 70–99)
GLUCOSE BLDC GLUCOMTR-MCNC: 160 MG/DL (ref 70–99)
GLUCOSE BLDC GLUCOMTR-MCNC: 84 MG/DL (ref 70–99)
GLUCOSE SERPL-MCNC: 95 MG/DL (ref 70–99)
HCT VFR BLD AUTO: 33.7 % (ref 35–47)
HGB BLD-MCNC: 10.6 G/DL (ref 11.7–15.7)
IMM GRANULOCYTES # BLD: 0 10E3/UL
IMM GRANULOCYTES NFR BLD: 1 %
LYMPHOCYTES # BLD AUTO: 2.3 10E3/UL (ref 0.8–5.3)
LYMPHOCYTES NFR BLD AUTO: 38 %
MAGNESIUM SERPL-MCNC: 1.8 MG/DL (ref 1.7–2.3)
MCH RBC QN AUTO: 27 PG (ref 26.5–33)
MCHC RBC AUTO-ENTMCNC: 31.5 G/DL (ref 31.5–36.5)
MCV RBC AUTO: 86 FL (ref 78–100)
MONOCYTES # BLD AUTO: 0.6 10E3/UL (ref 0–1.3)
MONOCYTES NFR BLD AUTO: 9 %
NEUTROPHILS # BLD AUTO: 3 10E3/UL (ref 1.6–8.3)
NEUTROPHILS NFR BLD AUTO: 47 %
NRBC # BLD AUTO: 0 10E3/UL
NRBC BLD AUTO-RTO: 0 /100
PLATELET # BLD AUTO: 278 10E3/UL (ref 150–450)
POTASSIUM SERPL-SCNC: 3.4 MMOL/L (ref 3.4–5.3)
PROT SERPL-MCNC: 6.6 G/DL (ref 6.4–8.3)
RBC # BLD AUTO: 3.92 10E6/UL (ref 3.8–5.2)
SODIUM SERPL-SCNC: 140 MMOL/L (ref 135–145)
WBC # BLD AUTO: 6.2 10E3/UL (ref 4–11)

## 2023-11-02 PROCEDURE — 258N000003 HC RX IP 258 OP 636

## 2023-11-02 PROCEDURE — 99232 SBSQ HOSP IP/OBS MODERATE 35: CPT | Performed by: STUDENT IN AN ORGANIZED HEALTH CARE EDUCATION/TRAINING PROGRAM

## 2023-11-02 PROCEDURE — 36415 COLL VENOUS BLD VENIPUNCTURE: CPT

## 2023-11-02 PROCEDURE — G0463 HOSPITAL OUTPT CLINIC VISIT: HCPCS

## 2023-11-02 PROCEDURE — 120N000002 HC R&B MED SURG/OB UMMC

## 2023-11-02 PROCEDURE — 85025 COMPLETE CBC W/AUTO DIFF WBC: CPT

## 2023-11-02 PROCEDURE — 250N000011 HC RX IP 250 OP 636: Mod: JZ

## 2023-11-02 PROCEDURE — 80053 COMPREHEN METABOLIC PANEL: CPT

## 2023-11-02 PROCEDURE — 83735 ASSAY OF MAGNESIUM: CPT | Performed by: STUDENT IN AN ORGANIZED HEALTH CARE EDUCATION/TRAINING PROGRAM

## 2023-11-02 PROCEDURE — C9113 INJ PANTOPRAZOLE SODIUM, VIA: HCPCS | Mod: JZ

## 2023-11-02 PROCEDURE — 999N000127 HC STATISTIC PERIPHERAL IV START W US GUIDANCE

## 2023-11-02 PROCEDURE — 250N000011 HC RX IP 250 OP 636: Mod: JZ | Performed by: STUDENT IN AN ORGANIZED HEALTH CARE EDUCATION/TRAINING PROGRAM

## 2023-11-02 PROCEDURE — 250N000011 HC RX IP 250 OP 636

## 2023-11-02 PROCEDURE — 250N000013 HC RX MED GY IP 250 OP 250 PS 637

## 2023-11-02 PROCEDURE — 999N000147 HC STATISTIC PT IP EVAL DEFER

## 2023-11-02 PROCEDURE — 250N000013 HC RX MED GY IP 250 OP 250 PS 637: Performed by: PHYSICIAN ASSISTANT

## 2023-11-02 RX ORDER — BENZONATATE 100 MG/1
100 CAPSULE ORAL 3 TIMES DAILY PRN
Status: DISCONTINUED | OUTPATIENT
Start: 2023-11-02 | End: 2023-11-08 | Stop reason: HOSPADM

## 2023-11-02 RX ORDER — LOPERAMIDE HCL 2 MG
4 CAPSULE ORAL
Status: DISCONTINUED | OUTPATIENT
Start: 2023-11-02 | End: 2023-11-05

## 2023-11-02 RX ORDER — GUAIFENESIN/DEXTROMETHORPHAN 100-10MG/5
10 SYRUP ORAL EVERY 4 HOURS PRN
Status: DISCONTINUED | OUTPATIENT
Start: 2023-11-02 | End: 2023-11-08 | Stop reason: HOSPADM

## 2023-11-02 RX ADMIN — OXYCODONE HYDROCHLORIDE 5 MG: 5 TABLET ORAL at 06:25

## 2023-11-02 RX ADMIN — Medication 2 SPRAY: at 20:41

## 2023-11-02 RX ADMIN — ACETAMINOPHEN 650 MG: 325 TABLET, FILM COATED ORAL at 02:07

## 2023-11-02 RX ADMIN — PANTOPRAZOLE SODIUM 40 MG: 40 INJECTION, POWDER, FOR SOLUTION INTRAVENOUS at 20:42

## 2023-11-02 RX ADMIN — SODIUM CHLORIDE, POTASSIUM CHLORIDE, SODIUM LACTATE AND CALCIUM CHLORIDE 1000 ML: 600; 310; 30; 20 INJECTION, SOLUTION INTRAVENOUS at 10:50

## 2023-11-02 RX ADMIN — ONDANSETRON 4 MG: 2 INJECTION INTRAMUSCULAR; INTRAVENOUS at 22:38

## 2023-11-02 RX ADMIN — DOXYCYCLINE 100 MG: 100 INJECTION, POWDER, LYOPHILIZED, FOR SOLUTION INTRAVENOUS at 09:21

## 2023-11-02 RX ADMIN — OXYCODONE HYDROCHLORIDE 5 MG: 5 TABLET ORAL at 18:28

## 2023-11-02 RX ADMIN — ENOXAPARIN SODIUM 40 MG: 40 INJECTION SUBCUTANEOUS at 13:10

## 2023-11-02 RX ADMIN — GUAIFENESIN AND DEXTROMETHORPHAN 10 ML: 100; 10 SYRUP ORAL at 22:26

## 2023-11-02 RX ADMIN — ACETAMINOPHEN 650 MG: 325 TABLET, FILM COATED ORAL at 10:55

## 2023-11-02 RX ADMIN — VENLAFAXINE 150 MG: 75 TABLET ORAL at 08:07

## 2023-11-02 RX ADMIN — ONDANSETRON 4 MG: 2 INJECTION INTRAMUSCULAR; INTRAVENOUS at 07:33

## 2023-11-02 RX ADMIN — TRIAMCINOLONE ACETONIDE: 1 PASTE DENTAL at 20:44

## 2023-11-02 RX ADMIN — LOPERAMIDE HYDROCHLORIDE 2 MG: 2 CAPSULE ORAL at 06:43

## 2023-11-02 RX ADMIN — PIPERACILLIN AND TAZOBACTAM 4.5 G: 4; .5 INJECTION, POWDER, FOR SOLUTION INTRAVENOUS at 13:05

## 2023-11-02 RX ADMIN — PIPERACILLIN AND TAZOBACTAM 4.5 G: 4; .5 INJECTION, POWDER, FOR SOLUTION INTRAVENOUS at 02:07

## 2023-11-02 RX ADMIN — TRIAMCINOLONE ACETONIDE: 1 PASTE DENTAL at 08:07

## 2023-11-02 RX ADMIN — LOPERAMIDE HYDROCHLORIDE 4 MG: 2 CAPSULE ORAL at 10:55

## 2023-11-02 RX ADMIN — OXYCODONE HYDROCHLORIDE 5 MG: 5 TABLET ORAL at 02:07

## 2023-11-02 RX ADMIN — PANTOPRAZOLE SODIUM 40 MG: 40 INJECTION, POWDER, FOR SOLUTION INTRAVENOUS at 08:07

## 2023-11-02 RX ADMIN — PIPERACILLIN AND TAZOBACTAM 4.5 G: 4; .5 INJECTION, POWDER, FOR SOLUTION INTRAVENOUS at 08:06

## 2023-11-02 RX ADMIN — PIPERACILLIN AND TAZOBACTAM 4.5 G: 4; .5 INJECTION, POWDER, FOR SOLUTION INTRAVENOUS at 20:42

## 2023-11-02 RX ADMIN — ACETAMINOPHEN 650 MG: 325 TABLET, FILM COATED ORAL at 18:28

## 2023-11-02 RX ADMIN — HYDROMORPHONE HYDROCHLORIDE 0.5 MG: 1 INJECTION, SOLUTION INTRAMUSCULAR; INTRAVENOUS; SUBCUTANEOUS at 22:26

## 2023-11-02 RX ADMIN — HYDROMORPHONE HYDROCHLORIDE 0.5 MG: 1 INJECTION, SOLUTION INTRAMUSCULAR; INTRAVENOUS; SUBCUTANEOUS at 07:39

## 2023-11-02 RX ADMIN — DOXYCYCLINE 100 MG: 100 INJECTION, POWDER, LYOPHILIZED, FOR SOLUTION INTRAVENOUS at 22:26

## 2023-11-02 RX ADMIN — LOPERAMIDE HYDROCHLORIDE 4 MG: 2 CAPSULE ORAL at 18:24

## 2023-11-02 RX ADMIN — VENLAFAXINE 150 MG: 75 TABLET ORAL at 20:42

## 2023-11-02 RX ADMIN — ARIPIPRAZOLE 2 MG: 2 TABLET ORAL at 08:07

## 2023-11-02 RX ADMIN — OXYCODONE HYDROCHLORIDE 5 MG: 5 TABLET ORAL at 10:55

## 2023-11-02 ASSESSMENT — ACTIVITIES OF DAILY LIVING (ADL)
ADLS_ACUITY_SCORE: 20

## 2023-11-02 NOTE — SUMMARY OF CARE
Arrived to unit @ 1745  Reason for admission: Febrile w/ increased ostomy output   Admitted from:  ED  Report received from:   Lean RN        2 RN skin assessment completed by: Unable to complete - oncoming RN aware      - Findings (add LDA if needed):   Bed algorithm reevaluated: Yes  Was Pulsate ordered?:No  Care plan (primary problem) and education initiated: Yes  MDRO education done if applicable: N/A  Pt informed about policy regarding no IV pumps off unit: Yes  Flu shot ordered? (October-April only): No  Detailed Belongings: Clothing, phone w/ , slippers

## 2023-11-02 NOTE — PLAN OF CARE
No acute events this shift. Alert and oriented x4, pleasant calm and cooperative with cares. Ileostomy bag in place, patent. Was seen and rounded by primary team. She complained of abdominal pain and was given dilaudid IV and oxycodone thereafter which offered good relief. Relief from nausea obtained after pt was given Zofran. WOCN attached drainage bag to ostomy pouch for ease of drainage. MD updated on drainage amount. She was then placed on gluten free diet and was able to tolerate it. LR bolus ordered and administered via pIV.  Resting in bed as of this time. Colorectal surgery consulting.    P: Continue to monitor ileostomy output. Administer Abx as ordered.

## 2023-11-02 NOTE — PLAN OF CARE
Goal Outcome Evaluation:      Plan of Care Reviewed With: patient    Overall Patient Progress: no changeOverall Patient Progress: no change    Outcome Evaluation: Assumed cares [id number]. Pt c/o abdominal pain and cramping. PRN oxycodone and dilaudid administered with slight effect. Loud and frequent bowel sounds audible without stethoscope. Large amts watery brown stool present in pouch. Up in room independently. Continue to assess pain and effectiveness of interventions. Assess GI status.

## 2023-11-02 NOTE — PLAN OF CARE
PT 7C: cancel/defer    Physical Therapy: Orders received. Chart reviewed and discussed with care team.? Physical Therapy not indicated due to current mobility status at baseline/IND in room. Pt met at bedside, denies mobility concerns for return to home and endorses mobilizing without assistance or aide. Pt is open to OP PT referral to progress overall activity tolerance but agrees therapy is not needed while IP. Encouraged pt to ambulate 3-4x daily with or without RN assistance.? Defer discharge recommendations to medical team, anticipate to home with OP PT.? Will complete orders.

## 2023-11-02 NOTE — PLAN OF CARE
Assumed cares [id number]. A&Ox4. Stable on RA. Afebrile. Abdominal pain relieved w/ oxy and tylenol given in ED prior to transferring to . Up ad miriam. Managing ostomy independently and recording output on whiteboard. Full liquid diet, ordered dinner. R PIV saline locked.

## 2023-11-02 NOTE — PROGRESS NOTES
[facility] Nurse Inpatient Assessment     Consulted for: diverting loop ileostomy       Patient History (according to provider note(s):      48 year old female, PMH of UC (possibly 2/2 immunotherapy colitis from history of melanoma) s/p TAC w/ EI in 6/2021 with parastomal hernia, followed by robotic proctectomy, end ileostomy take down, formation of j-pouch and diverting loop ileostomy in 9/2023.  Pt has had a tough post-op course and has had high ileostomy outputs requiring outpatient IV fluids.  Now admitted on 10/30 with 3-4 days of fever, increased ileostomy outputs and continued J-pouch drainage.     Assessment:      Areas visualized during today's visit: Abdomen    Assessment of Established loop Ileostomy:  How long has patient had ostomy: 9/2023  Stoma: retracted/flat, divot at 7 o clock   Mucutaneous junction: intact  Peristomal skin: maceration slight rash at edges of where pouch wafer is applied, mild   Output: watery, liquid, and undigested food     Is patient independent with ostomy care?: Yes  Home pouching system: deep convex coloplast fecal pouch with silvia ring, brava strips and belt    Pouching issues and/or educational needs: significant output causing difficulty sleeping due to need to empty all the time   Interventions completed today: Pouching system assessment   Pouching system in use while hospitalized:  Coloplast one piece, convex, barrier ring, and ostomy strips-- now trialing high output concha pouch with bedside drainage bag, one piece soft convex with coloplast convex barrier ring, silvia ring, brava strips and belt  Supplies location: gathered and at bedside     Met with patient, stating having to get up every half hour to empty leading to lack of sleep and exhaustion, brought patient samples of high output pouches that connect to bedside drainage bag, patient is interested in trying them, however, wants to wait until has a room and is  "not in the ED to change pouch, WOC will return tomorrow for re-assessment, gave patient a few extra pouches of usual supplies in case any leaks occur overnight--high output system placed 11/2, so far seems to be holding well, WOC will check on pouch tomorrow to ensure continued seal     Treatment Plan:     RLQ Ileostomy pouching plan:   Pouching system: ostomy supplies pouches: convex high output (special order)    Accessories used: [facility] ostomy accessories: 2\" Sarah Ring ([id number]), Large Belt (153843), Brava Barrier Strip (188195), and coloplast convex barrier ring (special order)    Frequency of pouch changes: Twice weekly and PRN leakage  WOC follow up plan: Weekly   Bedside RN interventions: Change pouch PRN if leaking using the supplies above, Empty pouch when 1/3 to 1/2 full, ensure to clean pouch outlet after emptying to prevent odor, Notify WOC for ongoing pouch leakage, and Document stoma appearance and output volume, color, and consistency every shift     DME for high output system:  Request the following supplies:      [employer]    1 piece soft convex high output drainable soft tap cut to fit up to 1-1/2 inch #95467    Accessories  Keith high output collection bag #5551  2  Sarah ring #[id number]     Coloplast Brava convex barrier ring 1 inch #06554    Adapt powder #7906    No sting film barrier # 3345                          Brava elastic barrier strips curved # [id number]    [employer] belt large #7290 (29-49 )       Orders: Written    RECOMMEND PRIMARY TEAM ORDER: None, at this time  Education provided: plan of care  Discussed plan of care with: Patient  WOC nurse follow-up plan:  tomorrow  Notify WOC if wound(s) deteriorate.  Nursing to notify the Provider(s) and re-consult the WOC Nurse if new skin concern.    DATA:     Current support surface: Standard  Standard gel/foam mattress (IsoFlex, Atmos air, etc)  Containment of urine/stool: Ileostomy pouch  BMI: Body mass index is 36.25 kg/m .   Active diet " order: Orders Placed This Encounter      Gluten Free Diet     Output: I/O last 3 completed shifts:  In: 600 [P.O.:600]  Out: 1425 [Stool:1425]     Labs:   Recent Labs   Lab 11/02/23  0659 10/30/23  1437 10/30/23  1436   ALBUMIN 3.4*   < > 3.8   HGB 10.6*   < >  --    INR  --   --  1.07   WBC 6.2   < >  --     < > = values in this interval not displayed.     Pressure injury risk assessment:   Sensory Perception: 4-->no impairment  Moisture: 4-->rarely moist  Activity: 3-->walks occasionally  Mobility: 4-->no limitation  Nutrition: 2-->probably inadequate  Friction and Shear: 3-->no apparent problem  Lv Score: 20      Pager no longer is use, please contact through [place]   Patricia group: [facility] Nurse [place]   Dept. Office Number: [phone]     40-59 yrs

## 2023-11-02 NOTE — PROGRESS NOTES
Colorectal Surgery Progress Note  [facility]    Subjective:  doing ok.  Ezio liquids.  Endorses pain around stoma today and also diffuse.  Intermittent nausea continues.  Passing less per anus.  Continues to have liquid ileo output.     Vitals:  Vitals:    11/01/23 1604 11/01/23 1800 11/02/23 0207 11/02/23 0704   BP: 98/60 95/46 106/58 99/59   BP Location:  Right arm Right arm Right arm   Pulse: 87 85 84 80   Resp:  18 16 16   Temp:  98.3  F (36.8  C) 98.5  F (36.9  C) 98.9  F (37.2  C)   TempSrc:  Oral Oral Oral   SpO2: 95% 97% 98% 98%     I/O:  I/O last 3 completed shifts:  In: 600 [P.O.:600]  Out: 1425 [Stool:1425]    Physical Exam:  Gen: AAOx3, NAD - looks better and brighter today.   Pulm: Non-labored breathing.  + cough continues this morning.   Abd: Soft, non-distended, mildly tender diffusely.     Stoma pink and viable with liquid output but also has an opaque bag on today.   Ext:  Warm and well-perfused    BMP  Recent Labs   Lab 11/02/23  0805 11/02/23  0659 11/02/23  0219 11/01/23  2237 11/01/23  0812 11/01/23  0701 10/31/23  0852 10/31/23  0556 10/31/23  0156 10/31/23  0145 10/30/23  2141 10/30/23  1436 10/27/23  2000   NA  --  140  --   --   --  139  --  137  --  133*  --  131* 133*   POTASSIUM  --  3.4  --   --   --  3.7  --  3.7  --   --   --  3.5 4.1   CHLORIDE  --  103  --   --   --  104  --  101  --   --   --  96* 97*   CO2  --  26  --   --   --  24  --  26  --   --   --  21* 18*   BUN  --  3.5*  --   --   --  3.1*  --  3.7*  --   --   --  3.4* 14.7   CR  --  0.79  --   --   --  0.72  --  0.83  --  0.90  --  0.83 1.01*   * 95 84 109*   < > 90   < > 99   < >  --    < > 132* 146*   MAG  --  1.8  --   --   --  1.6*  --   --   --   --   --  1.6* 1.6*   PHOS  --   --   --   --   --   --   --   --   --   --   --   --  3.6    < > = values in this interval not displayed.     CBC  Recent Labs   Lab 11/02/23  0659 11/01/23  0701 10/31/23  0556 10/30/23  1437   WBC 6.2 5.5  6.3 8.2   HGB 10.6* 10.4* 10.8* 11.3*   HCT 33.7* 32.7* 33.5* 34.2*    236 249 294         ASSESSMENT: 48 year old female, PMH of UC (possibly 2/2 immunotherapy colitis from history of melanoma) s/p TAC w/ EI in 6/2021 with parastomal hernia, followed by robotic proctectomy, end ileostomy take down, formation of j-pouch and diverting loop ileostomy in 9/2023.  Pt has had a tough post-op course and has had high ileostomy outputs requiring outpatient IV fluids.  Now admitted on 10/30 with 3-4 days of fever, increased ileostomy outputs and continued J-pouch drainage.       COVID, RSV, Flu, Cdiff (ostomy specimen), enteric panel (ostomy specimen), legionella, strep pneumo negative.     - greatly appreciate Medicine and GI management  - strict in and outs of all ileostomy output, J-pouch output, urine  - recommend changing to imodium 4mg BID (ordered)  - OK for a regular diet as tolerated from a CRS perspective. (Ordered after discussing w/ Medicine team)  - Fluid resuscitate.   - apprec WOCN  - apprec dietician recs.  Rec reg diet w/ supplements.  If not able to maintain adequate caloric intake, we would likely recommend feeding tube as next step  - is scheduled for ileostomy take down with Dr. Ram in Dec.   - agree with protonix BID  - discussed with medicine team    KEITH Blair-JACE ..................11/2/2023   7:49 AM  Colon and Rectal Surgery    Patient was seen and discussed with Dr. Varela    The above plan of care was performed and communicated to me by Dr. Ram    I spent 35 minute face-to-face or coordinating care of Doretha Yu. Over 50% of our time on the unit was spent counseling the patient and/or coordinating care as documented in the assessment and plan.

## 2023-11-02 NOTE — PLAN OF CARE
Status: admitted 10/30 for increased output from ileostomy  Vitals: VSS on RA  Neuros: intact- A&Ox4, denies N/T 5/5 throughout  IV: PIV TKO btwn IV abx  Diet: full liquid  Bowel status: ostomy in place, pt emptying independently and writing  amounts on board. Loud, frequent BS  : voids spontaneously  Skin: ostomy  Pain: abd pain. Took tylenol & oxy x1 this shift  Activity: ad miriam  Plan: continue to monitor

## 2023-11-02 NOTE — PROGRESS NOTES
[facility]    Internal Medicine Progress Note - [facility] 2 Service    Resident/Fellow Attestation   I, Ale Brasher MD, was present with the medical/ASHANTI student who participated in the service and in the documentation of the note.  I have verified the history and personally performed the physical exam and medical decision making.  I agree with the assessment and plan of care as documented in the note as edited by me.     Ale Brasher MD  PGY2  Date of Service (when I saw the patient): 11/02/23      Main Plans for Today   - Per GI recs  - Goal <1 L ostomy output/day   - Switch to regular diet (gluten free)    - Ensure Max Protein Supplements   - Switch Imodium dose to 4 mg BID  - Resume PTA IVF   - Continue enoxaparin 40mg every day  - Stop Zosyn 11/03    Assessment & Plan   Doretha Yu is a 48 year old female admitted on 10/30/2023. 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 Pt s/p TAC with end ileostomy (laparoscopic) on 6/15/21 with subsequent parastomal hernia now s/p robotic protectectomy, ileostomy takedown, and creation of J-pouch and diverting loop ileostomy on 9/5/23. 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 Pt presents with fever x 3 days and increased ostomy output x 2 weeks, admitted at recommendation of GI. Pt states that she has had increased ostomy output for the last two weeks. Fever for the last 3 days. Pt states she has had very poor po intake for the last few days. No urinary sx, shortness of breath, cough. No recent travel. Lives with 15 y/o daughter. No recent abx.     Nivolumab induced UC  Increased ostomy output  Severe protein calorie malnutrition   H/o nivolumab-induced ulcerative colitis s/p TAC with end  ileostomy (laparoscopic) on 6/15/21 with subsequent parastomal hernia now s/p robotic protectectomy, ileostomy takedown, and creation of J-pouch and diverting loop ileostomy on 9/5/23. Pt states since this time she has noticed increased ostomy output, but in the last two weeks much more so with correlating reduced PO intake. Has been seen in the ED on several occasions and has been getting OP IVF at an infusion center as output so significant. No blood in output. No recent abx.      FLUIDS              - 1L LR     WORKUP              - Per GI as below                      - Blood cultures              - C. Diff: Negative              - UA moderate LE and few bacteria, UC pending              - Amylase: WNL              - Urine legionella: Negative   - Enteric Bacteria and Virus: Negative   - Fecal calprotectin pending   - CT enterography: LLL consolidation, surgical changes of total colectomy and completion proctectomy w/R quadrant diverting ileostomy w/o evidence of infectious or inflammatory bowel pathology    ABX              - Zosyn 10/30-*     CONSULTS              - GI consulted, appreciate recs  - Infectious Stool Studies: C. Difficile Toxin B PCR with reflex to C. Difficile Antigen and Toxins A/B EIA, Enteric Bacteria and Virus Panel PCR  - Fecal Calprotectin  - Trend daily labs: CBC and CMP  - CT Enterography for further assessment of bowel, infection/abscess, or other intra-abdominal pathology  - IV Pantoprazole 40 mg BID  - Loperamide 4mg BID   - Avoid artificial sugars   - Strict documentation of I/O  - Defer to CR surgery for post-operative loose stool management   - VTE prophylaxis   - Health psychology consult for coping strategies  - Psychiatry consult for mental health medical management  - Output goal < 1L/day  - Attempt regular gluten free diet, but if not maintaining PO, next consideration would be [place]   - Ensure Max Protein Supplements   - Resume PTA fluids   - Follow up in 4 weeks with [facility]  GI    - Colorectal surgery consulted, appreciate recs   - Agree with strict documentation of I/O  - Dietician consult: Education, medical food supplementation therapy   - WOCN for ostomy supplies: Patient given deep convex coloplast fecal pouches with silvia rings and comfeel to use as brava strips     # Pneumonia  CT enterography shows possible infiltrate LLL, well covered with zosyn, will add azithromycin for atypical coverage while undergoing workup. Urine Legionella and Strep as well as respiratory viral panel negative.   - Zosyn as above + doxycycline added x 3 days 500 mg (10/30-11/1)     Hypomagnesemia  Likely 2/2 diarrhea.  -Resolved at 1.8 on 11/02 with RN magnesium protocol, will continue for now    Aphthous ulcers  Sores on upper lip, lower lip, and L side of tongue. Endorses these occur when she is sick. May be stress induced or another manifestation of her autoimmune illnesses.   - Consult dental, recs appreciated   - Biotene   - Lidocaine swish and spit   - Triamcinolone paste    Hyponatremia  Likely hypovolemic hyponatremia in setting of increased UOP. To be complete can get baseline studies. Urine Legionella negative. Osmolality decreased in blood 271 and urine 86.   - Resolved      Chronic Transaminitis  - Pending above workup  - Hepatitis panel negative   - Resolving with hydration     Depression  Anxiety  Patient endorses struggle coping with chemotherapy-induced autoimmune diseases that have led to GI surgeries and complications. Depression and anxiety are uncontrolled even on EFFEXOR (generic venlafaxine is refused). Has asked for help from psych when talking to GI.   - Currently PTA venlafaxine 150mg BID  - Consult psychology for coping strategies and support   - Will follow 1x/week  - Consult psychiatry with help with meds, recs appreciated   - Add aripiprazole 2mg po qam      DM2  A1c 6.5% last 10/25. Takes metformin 500 BID as OP.   - Hold metformin  - LDSSI     Hypothyroidism  - Do not see  PTA meds on list, can ask if she takes in AM     Normocytic Anemia  Baseline Hb 13, here with Hb of 10.6 on 11/02. Plts wnl.      STORMY  - CPAP     Chronic Pain  - APAP  - oxycodone 5 mg q 4  - Dilaudid 0.2-0.5 q 3 for short term only    Prothrombin Mutation  She had genetic testing done back when her first child was born and she had a CVA. It showed prothrombin mutation, not deficiency (deficiency may have been accidentally charted by others at some point). As a result, she was placed on prophylactic aspirin but was taken off when the UC symptoms began. This mutation makes her relatively more prothrombotic, will continue Lovenox IP as IBD makes you more thrombotic.   - Consider hematology as outpatient due to hypercoagulability with prothrombin mutation + IBD     Melanoma history:   - see H&P subjective section for full history, follows with [place].     DISCHARGE PLANNING:     NEW MEDS:   -    MEDS TO STOP/HOLD:   - Hold hydroxyzine     FOLLOWUP:   - Follow up in 4 weeks with [facility]  - Scheduled for upcoming Ileostomy closure on 12/27/2023 with Dr. Ram   - Consider hematology as outpatient due to hypercoagulability with prothrombin mutation + IBD    Diet: Full Liquid Diet  Snacks/Supplements Adult: Other; With all meals please send: 1 beneprotein powder, 1 Banatrol Plus, 1 greek yogurt; With Meals  Snacks/Supplements Adult: iCrederity Standard 1.4 Oral Supplement; With Meals  Snacks/Supplements Adult: iCrederity Standard Oral Supplement; With Meals  Fluids: 125cc/hr discontinued, now bolus  Lines: PIV  Major Catheter: Not present    DVT Prophylaxis: Enoxaparin 40mg every day subq  Code Status: Full Code    Expected Discharge Date: 11/02/2023,  3:00 PM      Discharge Comments: ?     [employer] [place]  Medical Student (MS3)  [employer]    Pt staffed with Dr. Enoc Robledo MD who agrees with this plan.     Kathy Brasher DO   Internal Medicine PGY2  [facility]  Please use  on call emily for paging    Interval History   Doretha was interviewed in her new room. She is glad she finally got a placement because the ED was hectic and it was hard for her to sleep. She does not feel she is improving despite looking and sounding better. She was nauseated over night and says she spits up. Her cough may be improved but now she is coughing up material. A collection cup was provided for her to collect a sample. Her pain which was at first localized to the LUQ has now moved to over her entire upper and middle quadrants ending near where her last ostomy scar is (a few inches above the current one). She received the triamcinolone paste this morning which helped, so we will go with that for the mouth ulcers. Her hang nails have not changed and do not bother her much. She said CR surgery already saw her this morning and are okay with her starting a full diet. This is something she is willing to try and currently states she is hungry. She expresses that she really does not want to do a feeding tube or TPN. Pt is informed that we will speak with CR and nutrition today to come up with a plan now that we have some I/O information. She is agreeable to this and expresses that she really does not want to keep coming to the hospital. She has two children, one that lives with her and relies on her, and she needs to be home for the holidays. Reassurance and comfort were provided. No other concerns.     When the team came back later in the morning we mostly spoke about her antithrombin situation. She had genetic testing done back when her first child was born and she had a CVA. It showed prothrombin mutation, not deficiency (deficiency may have been accidentally charted by others at some point). As a result, she was placed on prophylactic aspirin but was taken off when the UC symptoms began. Plan is to continue to enoxaparin. She is agreeable. States that she is still coughing up sputum and expectorates while we  are at her bedside. We provided the collection cup and she said she will continue working on it. Also informed that we would like to see her up and moving. She is agreeable to PT.     Physical Exam   Vital Signs: Temp: 98.9  F (37.2  C) Temp src: Oral BP: 99/59 Pulse: 80   Resp: 16 SpO2: 98 % O2 Device: None (Room air)    Weight: 0 lbs 0 oz    General: Pt laying comfortably in bed, in no acute distress. Not requiring oxygen.   Cardio: Regular rate and rhythm. No murmurs, gallops, rubs.   Pulm: All lung fields clear without wheezes, crackles, or rhonchi.    Abd: Active bowel sounds. Tenderness to palpation in upper and lower quadrants, more severe along midline, minimal guarding or rebound.   HEENT: White ulcers on upper and lower lip ~1cm, 0.5cm bilateral tongue.  MSK: Erythematous presumed hang nail lesions on several fingers of both hands, no visible drainage.    Lower extremities: No lower extremity edema present bilaterally.   Neuro: Alert and oriented to person, place, and time.   Psych: Appropriate mood and affect.     Data   Recent Labs   Lab 11/02/23  0659 11/02/23  0219 11/01/23  2237 11/01/23  1815 11/01/23  0812 11/01/23  0701 10/31/23  0852 10/31/23  0556 10/31/23  0156 10/31/23  0145 10/30/23  1437 10/30/23  1436   WBC 6.2  --   --   --   --  5.5  --  6.3  --   --    < >  --    HGB 10.6*  --   --   --   --  10.4*  --  10.8*  --   --    < >  --    MCV 86  --   --   --   --  87  --  83  --   --    < >  --      --   --   --   --  236  --  249  --   --    < >  --    INR  --   --   --   --   --   --   --   --   --   --   --  1.07   NA  --   --   --   --   --  139  --  137  --  133*  --  131*   POTASSIUM  --   --   --   --   --  3.7  --  3.7  --   --   --  3.5   CHLORIDE  --   --   --   --   --  104  --  101  --   --   --  96*   CO2  --   --   --   --   --  24  --  26  --   --   --  21*   BUN  --   --   --   --   --  3.1*  --  3.7*  --   --   --  3.4*   CR  --   --   --   --   --  0.72  --  0.83  --   0.90  --  0.83   ANIONGAP  --   --   --   --   --  11  --  10  --   --   --  14   PATRICIA  --   --   --   --   --  8.7  --  8.8  --   --   --  8.8   GLC  --  84 109* 93   < > 90   < > 99   < >  --    < > 132*   ALBUMIN  --   --   --   --   --  3.0*  --  3.3*  --   --   --  3.8   PROTTOTAL  --   --   --   --   --  5.8*  --  6.3*  --   --   --  7.1   BILITOTAL  --   --   --   --   --  <0.2  --  0.2  --   --   --  0.2   ALKPHOS  --   --   --   --   --  181*  --  207*  --   --   --  258*   ALT  --   --   --   --   --  69*  --  104*  --   --   --  146*   AST  --   --   --   --   --  33  --  49*  --   --   --  92*    < > = values in this interval not displayed.       Imaging results reviewed over the past 24 hrs:   No results found for this or any previous visit (from the past 24 hour(s)).

## 2023-11-02 NOTE — PLAN OF CARE
Assumed cares [id number]. A&Ox4. Stable on RA. Frequent cough. MD paged for robitussin or tessalon pearls - awaiting orders. Abdominal pain - tylenol and oxy for management. Ostomy intact with significant output. Ostomy managed independently and pt recording output. Tolerating gluten free diet. R PIV saline locked. LR bolus completed.     Goal Outcome Evaluation:      Plan of Care Reviewed With: patient    Overall Patient Progress: no change

## 2023-11-03 LAB
ALBUMIN SERPL BCG-MCNC: 3 G/DL (ref 3.5–5.2)
ALP SERPL-CCNC: 168 U/L (ref 35–104)
ALT SERPL W P-5'-P-CCNC: 50 U/L (ref 0–50)
ANION GAP SERPL CALCULATED.3IONS-SCNC: 10 MMOL/L (ref 7–15)
AST SERPL W P-5'-P-CCNC: 27 U/L (ref 0–45)
BILIRUB SERPL-MCNC: <0.2 MG/DL
BUN SERPL-MCNC: 6 MG/DL (ref 6–20)
CALCIUM SERPL-MCNC: 8.7 MG/DL (ref 8.6–10)
CHLORIDE SERPL-SCNC: 106 MMOL/L (ref 98–107)
CREAT SERPL-MCNC: 0.83 MG/DL (ref 0.51–0.95)
DEPRECATED HCO3 PLAS-SCNC: 24 MMOL/L (ref 22–29)
EGFRCR SERPLBLD CKD-EPI 2021: 87 ML/MIN/1.73M2
ERYTHROCYTE [DISTWIDTH] IN BLOOD BY AUTOMATED COUNT: 16.4 % (ref 10–15)
GLUCOSE BLDC GLUCOMTR-MCNC: 108 MG/DL (ref 70–99)
GLUCOSE BLDC GLUCOMTR-MCNC: 127 MG/DL (ref 70–99)
GLUCOSE BLDC GLUCOMTR-MCNC: 130 MG/DL (ref 70–99)
GLUCOSE BLDC GLUCOMTR-MCNC: 136 MG/DL (ref 70–99)
GLUCOSE BLDC GLUCOMTR-MCNC: 80 MG/DL (ref 70–99)
GLUCOSE SERPL-MCNC: 81 MG/DL (ref 70–99)
HCT VFR BLD AUTO: 29.8 % (ref 35–47)
HGB BLD-MCNC: 9.4 G/DL (ref 11.7–15.7)
MCH RBC QN AUTO: 26.8 PG (ref 26.5–33)
MCHC RBC AUTO-ENTMCNC: 31.5 G/DL (ref 31.5–36.5)
MCV RBC AUTO: 85 FL (ref 78–100)
MITOCHONDRIA M2 IGG SER-ACNC: <1 U/ML
PLATELET # BLD AUTO: 295 10E3/UL (ref 150–450)
POTASSIUM SERPL-SCNC: 3.4 MMOL/L (ref 3.4–5.3)
PROT SERPL-MCNC: 5.6 G/DL (ref 6.4–8.3)
RBC # BLD AUTO: 3.51 10E6/UL (ref 3.8–5.2)
SODIUM SERPL-SCNC: 140 MMOL/L (ref 135–145)
WBC # BLD AUTO: 5.7 10E3/UL (ref 4–11)

## 2023-11-03 PROCEDURE — 99231 SBSQ HOSP IP/OBS SF/LOW 25: CPT | Performed by: STUDENT IN AN ORGANIZED HEALTH CARE EDUCATION/TRAINING PROGRAM

## 2023-11-03 PROCEDURE — 85027 COMPLETE CBC AUTOMATED: CPT

## 2023-11-03 PROCEDURE — 80053 COMPREHEN METABOLIC PANEL: CPT

## 2023-11-03 PROCEDURE — 250N000013 HC RX MED GY IP 250 OP 250 PS 637

## 2023-11-03 PROCEDURE — 999N000127 HC STATISTIC PERIPHERAL IV START W US GUIDANCE

## 2023-11-03 PROCEDURE — 36415 COLL VENOUS BLD VENIPUNCTURE: CPT

## 2023-11-03 PROCEDURE — G0463 HOSPITAL OUTPT CLINIC VISIT: HCPCS

## 2023-11-03 PROCEDURE — C9113 INJ PANTOPRAZOLE SODIUM, VIA: HCPCS | Mod: JZ

## 2023-11-03 PROCEDURE — 258N000003 HC RX IP 258 OP 636

## 2023-11-03 PROCEDURE — 250N000011 HC RX IP 250 OP 636: Mod: JZ

## 2023-11-03 PROCEDURE — 250N000011 HC RX IP 250 OP 636: Mod: JZ | Performed by: STUDENT IN AN ORGANIZED HEALTH CARE EDUCATION/TRAINING PROGRAM

## 2023-11-03 PROCEDURE — 120N000002 HC R&B MED SURG/OB UMMC

## 2023-11-03 RX ADMIN — ONDANSETRON 4 MG: 2 INJECTION INTRAMUSCULAR; INTRAVENOUS at 09:28

## 2023-11-03 RX ADMIN — VENLAFAXINE 150 MG: 75 TABLET ORAL at 21:58

## 2023-11-03 RX ADMIN — PIPERACILLIN AND TAZOBACTAM 4.5 G: 4; .5 INJECTION, POWDER, FOR SOLUTION INTRAVENOUS at 08:33

## 2023-11-03 RX ADMIN — ARIPIPRAZOLE 2 MG: 2 TABLET ORAL at 08:33

## 2023-11-03 RX ADMIN — OXYCODONE HYDROCHLORIDE 5 MG: 5 TABLET ORAL at 18:48

## 2023-11-03 RX ADMIN — DOXYCYCLINE 100 MG: 100 INJECTION, POWDER, LYOPHILIZED, FOR SOLUTION INTRAVENOUS at 21:58

## 2023-11-03 RX ADMIN — GUAIFENESIN AND DEXTROMETHORPHAN 10 ML: 100; 10 SYRUP ORAL at 21:57

## 2023-11-03 RX ADMIN — PANTOPRAZOLE SODIUM 40 MG: 40 INJECTION, POWDER, FOR SOLUTION INTRAVENOUS at 21:57

## 2023-11-03 RX ADMIN — ACETAMINOPHEN 650 MG: 325 TABLET, FILM COATED ORAL at 09:59

## 2023-11-03 RX ADMIN — VENLAFAXINE 150 MG: 75 TABLET ORAL at 08:33

## 2023-11-03 RX ADMIN — OXYCODONE HYDROCHLORIDE 5 MG: 5 TABLET ORAL at 22:40

## 2023-11-03 RX ADMIN — ENOXAPARIN SODIUM 40 MG: 40 INJECTION SUBCUTANEOUS at 14:29

## 2023-11-03 RX ADMIN — DOXYCYCLINE 100 MG: 100 INJECTION, POWDER, LYOPHILIZED, FOR SOLUTION INTRAVENOUS at 09:35

## 2023-11-03 RX ADMIN — Medication 2 SPRAY: at 09:35

## 2023-11-03 RX ADMIN — OXYCODONE HYDROCHLORIDE 5 MG: 5 TABLET ORAL at 01:53

## 2023-11-03 RX ADMIN — PIPERACILLIN AND TAZOBACTAM 4.5 G: 4; .5 INJECTION, POWDER, FOR SOLUTION INTRAVENOUS at 01:53

## 2023-11-03 RX ADMIN — BENZONATATE 100 MG: 100 CAPSULE ORAL at 17:31

## 2023-11-03 RX ADMIN — ACETAMINOPHEN 650 MG: 325 TABLET, FILM COATED ORAL at 01:53

## 2023-11-03 RX ADMIN — PIPERACILLIN AND TAZOBACTAM 4.5 G: 4; .5 INJECTION, POWDER, FOR SOLUTION INTRAVENOUS at 14:28

## 2023-11-03 RX ADMIN — OXYCODONE HYDROCHLORIDE 5 MG: 5 TABLET ORAL at 09:29

## 2023-11-03 RX ADMIN — SODIUM CHLORIDE, POTASSIUM CHLORIDE, SODIUM LACTATE AND CALCIUM CHLORIDE 1000 ML: 600; 310; 30; 20 INJECTION, SOLUTION INTRAVENOUS at 13:31

## 2023-11-03 RX ADMIN — PIPERACILLIN AND TAZOBACTAM 4.5 G: 4; .5 INJECTION, POWDER, FOR SOLUTION INTRAVENOUS at 22:40

## 2023-11-03 RX ADMIN — PANTOPRAZOLE SODIUM 40 MG: 40 INJECTION, POWDER, FOR SOLUTION INTRAVENOUS at 08:34

## 2023-11-03 RX ADMIN — LOPERAMIDE HYDROCHLORIDE 4 MG: 2 CAPSULE ORAL at 17:28

## 2023-11-03 RX ADMIN — ACETAMINOPHEN 650 MG: 325 TABLET, FILM COATED ORAL at 18:48

## 2023-11-03 RX ADMIN — LOPERAMIDE HYDROCHLORIDE 4 MG: 2 CAPSULE ORAL at 08:33

## 2023-11-03 ASSESSMENT — ACTIVITIES OF DAILY LIVING (ADL)
ADLS_ACUITY_SCORE: 20
DEPENDENT_IADLS:: INDEPENDENT
ADLS_ACUITY_SCORE: 20

## 2023-11-03 NOTE — CONSULTS
Care Management Initial Consult    General Information  Assessment completed with: Patient,    Type of CM/SW Visit: Initial Assessment  Primary Care Provider verified and updated as needed: Yes   Readmission within the last 30 days: no previous admission in last 30 days   Reason for Consult: discharge planning  Advance Care Planning: Advance Care Planning Reviewed: present on chart        Communication Assessment  Patient's communication style: spoken language (English or Bilingual)    Hearing Difficulty or Deaf: no   Wear Glasses or Blind: no    Cognitive  Cognitive/Neuro/Behavioral: WDL                      Living Environment:   People in home: child(gold), dependent     Current living Arrangements: house      Able to return to prior arrangements: yes     Family/Social Support:  Care provided by: self, parent(s)  Provides care for: child(gold)  Marital Status:   Support System: Parent(s), Sibling(s)          Description of Support System: Supportive, Involved       Current Resources:   Patient receiving home care services: Yes  Skilled Home Care Services: Skilled Nursing  Community Resources: Home Infusion  Equipment currently used at home: shower chair  Supplies currently used at home: Diabetic Supplies, Wound Care Supplies    Employment/Financial:  Employment Status: employed full-time, other (see comments) (FMLA)      Financial Concerns: none   Referral to Financial Worker: No  Does the patient's insurance plan have a 3 day qualifying hospital stay waiver?  No    Lifestyle & Psychosocial Needs:  Social Determinants of Health     Food Insecurity: No Food Insecurity (6/28/2021)    Hunger Vital Sign     Worried About Running Out of Food in the Last Year: Never true     Ran Out of Food in the Last Year: Never true   Depression: Not at risk (8/22/2023)    PHQ-2     PHQ-2 Score: 0   Recent Concern: Depression - At risk (7/11/2023)    PHQ-2     PHQ-2 Score: 6   Housing Stability: Not on file   Tobacco Use:  Medium Risk (10/30/2023)    Patient History     Smoking Tobacco Use: Former     Smokeless Tobacco Use: Never     Passive Exposure: Not on file   Financial Resource Strain: High Risk (6/28/2021)    Overall Financial Resource Strain (CARDIA)     Difficulty of Paying Living Expenses: Very hard   Alcohol Use: Not on file   Transportation Needs: No Transportation Needs (6/28/2021)    PRAPARE - Transportation     Lack of Transportation (Medical): No     Lack of Transportation (Non-Medical): No   Physical Activity: Not on file   Interpersonal Safety: Unknown (6/25/2021)    Humiliation, Afraid, Rape, and Kick questionnaire     Fear of Current or Ex-Partner: Not asked     Emotionally Abused: Not asked     Physically Abused: Not asked     Sexually Abused: Not asked   Stress: Stress Concern Present (6/25/2021)    [employer] - Occupational Stress Questionnaire     Feeling of Stress : Very much   Social Connections: Unknown (6/25/2021)    Social Connection and Isolation Panel [NHANES]     Frequency of Communication with Friends and Family: Not asked     Frequency of Social Gatherings with Friends and Family: Not asked     Attends Yazidi Services: Not asked     Active Member of Clubs or Organizations: Not asked     Attends Club or Organization Meetings: Not asked     Marital Status:      Functional Status:  Prior to admission patient needed assistance:   Dependent ADLs:: Independent  Dependent IADLs:: Independent     Mental Health Status:  Mental Health Status: No Current Concerns    Mental Health Management: Medication    Chemical Dependency Status:  Chemical Dependency Status: No Current Concerns           Values/Beliefs:  Spiritual, Cultural Beliefs, Yazidi Practices, Values that affect care: no             Additional Information:  CMA completed at bedside, indicated by elevated readmit risk and PTA skilled services. Pt confirmed listed address, PCP ([facility]), and payer source (IMM -  no). Pt resides at noted home with a dependent child; reports strong support from parental figures. PTA services include home infusion, via [facility], for intermittent, maintenance IV fluid - up to x3/week. Infusion orders placed for discharge. DME used include shower chair, ostomy supplies, and blood glucose monitoring. Pt denies any need r/t financial concerns, spiritual support, or mental health; reports ongoing mental health maintenance with Rx mgmt. Pt requesting assistance in requesting an extension of her FMLA, as she continues to be employed full-time; Pt advised to speak to medical team for needed note/letter for extending her FMLA. Pt also reports an ongoing issue with billing, receiving prior service bill from prior to her last-name change. RNCC advised to contact billing, noted his attempt was hindered by an extensive hold-time; referred pt to use POPAPP for alternate means to discuss billing issue. CM team to continue to follow and support safe discharge planning.         Bernabe Duarte RN BSN  7C RNCC  Phone: [phone]  Pager: [phone]    For Weekend & Holiday on call RN Care Coordinator:  (Tasks: Home care, home infusion, medical equipment, transportation, IMM & LOPEZ forms, etc.)  [place] (0800 - 1630) Saturday and Sunday    Units: 5A, 5B, & 5C: [phone]    Units: 6B, 6C, 6D: [phone]    Units: 7A, 7B, 7C, 7D: [phone]    Units: 6A/ICU : [phone]    [facility] ([id number]) Saturday and Sunday    Units: 6 Med/Surg, 8A, 10 ICU, & Pediatric Units: [phone]    Units: 5 Ortho, 5Med/Surg & WB ED: [phone]

## 2023-11-03 NOTE — PROGRESS NOTES
[facility]    Internal Medicine Progress Note - [place] [id number] Service    Resident/Fellow Attestation   I, Ale Brasher MD, was present with the medical/ASHANTI student who participated in the service and in the documentation of the note.  I have verified the history and personally performed the physical exam and medical decision making.  I agree with the assessment and plan of care as documented in the note as edited by me.     Ale Brasher MD  PGY2  Date of Service (when I saw the patient): 11/03/23      Main Plans for Today   - Calorie counts  - Last day of Zosyn  - Continue to monitor I/O    Assessment & Plan   Doretha Yu is a 48 year old female admitted on 10/30/2023. 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 Pt s/p TAC with end ileostomy (laparoscopic) on 6/15/21 with subsequent parastomal hernia now s/p robotic protectectomy, ileostomy takedown, and creation of J-pouch and diverting loop ileostomy on 9/5/23. 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 Pt presents with fever x 3 days and increased ostomy output x 2 weeks, admitted at recommendation of GI. Pt states that she has had increased ostomy output for the last two weeks. Fever for the last 3 days. Pt states she has had very poor po intake for the last few days. No urinary sx, shortness of breath, cough. No recent travel. Lives with 15 y/o daughter. No recent abx.     Nivolumab induced UC  Increased ostomy output  Severe protein calorie malnutrition   H/o nivolumab-induced ulcerative colitis s/p TAC with end ileostomy (laparoscopic) on 6/15/21 with subsequent parastomal hernia now s/p robotic protectectomy, ileostomy takedown, and creation of J-pouch and diverting loop ileostomy  on 9/5/23. Pt states since this time she has noticed increased ostomy output, but in the last two weeks much more so with correlating reduced PO intake. Has been seen in the ED on several occasions and has been getting OP IVF at an infusion center as output so significant. No blood in output. No recent abx.      FLUIDS              - 1L LR     WORKUP              - Per GI as below                      - Blood cultures              - C. Diff: Negative              - UA moderate LE and few bacteria, UC pending              - Amylase: WNL              - Urine legionella: Negative   - Enteric Bacteria and Virus: Negative   - Fecal calprotectin pending   - CT enterography: LLL consolidation, surgical changes of total colectomy and completion proctectomy w/R quadrant diverting ileostomy w/o evidence of infectious or inflammatory bowel pathology    ABX              - Zosyn 10/30-11/03     CONSULTS              - GI consulted, appreciate recs  - Infectious Stool Studies: C. Difficile Toxin B PCR with reflex to C. Difficile Antigen and Toxins A/B EIA, Enteric Bacteria and Virus Panel PCR  - Fecal Calprotectin  - Trend daily labs: CBC and CMP  - CT Enterography for further assessment of bowel, infection/abscess, or other intra-abdominal pathology  - IV Pantoprazole 40 mg BID  - Loperamide 4mg BID   - Avoid artificial sugars   - Strict documentation of I/O  - Defer to CR surgery for post-operative loose stool management   - VTE prophylaxis   - Health psychology consult for coping strategies  - Psychiatry consult for mental health medical management  - Output goal < 1L/day  - Attempt regular gluten free diet, but if not maintaining PO, next consideration would be [place]   - Ensure Max Protein Supplements   - Resume PTA fluids   - Follow up in 4 weeks with [facility]    - Colorectal surgery consulted, appreciate recs   - Agree with strict documentation of I/O  - Dietician consult: Education, medical food supplementation therapy   -  WOCN for ostomy supplies: Patient given deep convex coloplast fecal pouches with silvia rings and comfeel to use as brava strips   - Recommends feeding tube next if she cannot maintain adequate caloric intake      Pneumonia  CT enterography shows possible infiltrate LLL, well covered with zosyn, will add azithromycin for atypical coverage while undergoing workup. Urine Legionella and Strep as well as respiratory viral panel negative.   - Zosyn as above + doxycycline added x 3 days 500 mg (10/30-11/1)     Hypomagnesemia  Likely 2/2 diarrhea.  -Resolved at 1.8 on 11/02 with RN magnesium protocol, will continue for now    Aphthous ulcers  Sores on upper lip, lower lip, and L side of tongue. Endorses these occur when she is sick. May be stress induced or another manifestation of her autoimmune illnesses.   - Consult dental, recs appreciated   - Biotene   - Lidocaine swish and spit   - Triamcinolone paste    Hyponatremia  Likely hypovolemic hyponatremia in setting of increased UOP. To be complete can get baseline studies. Urine Legionella negative. Osmolality decreased in blood 271 and urine 86.   - Resolved      Chronic Transaminitis  - Pending above workup  - Hepatitis panel negative   - Resolving with hydration     Depression  Anxiety  Patient endorses struggle coping with chemotherapy-induced autoimmune diseases that have led to GI surgeries and complications. Depression and anxiety are uncontrolled even on EFFEXOR (generic venlafaxine is refused). Has asked for help from psych when talking to GI.   - Currently PTA venlafaxine 150mg BID  - Consult psychology for coping strategies and support   - Will follow 1x/week  - Consult psychiatry with help with meds, recs appreciated   - Add aripiprazole 2mg po qam      DM2  A1c 6.5% last 10/25. Takes metformin 500 BID as OP.   - Hold metformin  - LDSSI     Hypothyroidism  - Do not see PTA meds on list, can ask if she takes in AM     Normocytic Anemia  Baseline Hb 13, here with  Hb of 9.4 on 11/03. Plts wnl.      STORMY  - CPAP     Chronic Pain  - APAP  - oxycodone 5 mg q 4  - Dilaudid 0.2-0.5 q 3 for short term only    Prothrombin Mutation  She had genetic testing done back when her first child was born and she had a CVA. It showed prothrombin mutation, not deficiency (deficiency may have been accidentally charted by others at some point). As a result, she was placed on prophylactic aspirin but was taken off when the UC symptoms began. This mutation makes her relatively more prothrombotic, will continue Lovenox IP as IBD makes you more thrombotic.   - Consider hematology as outpatient due to hypercoagulability with prothrombin mutation + IBD     Melanoma history:   - see H&P subjective section for full history, follows with [facility].     DISCHARGE PLANNING:     NEW MEDS:   -    MEDS TO STOP/HOLD:   - Hold hydroxyzine   - Hold methocarbamol     FOLLOWUP:   - Follow up in 4 weeks with [facility]  - Scheduled for upcoming Ileostomy closure on 12/27/2023 with Dr. Ram   - Consider hematology as outpatient due to hypercoagulability with prothrombin mutation + IBD    Diet: Snacks/Supplements Adult: Other; With all meals please send: 1 beneprotein powder, 1 Banatrol Plus, 1 greek yogurt; With Meals  Snacks/Supplements Adult: [employer] Standard 1.4 Oral Supplement; With Meals  Snacks/Supplements Adult: [employer] Standard Oral Supplement; With Meals  Gluten Free Diet  Fluids: 125cc/hr discontinued, now bolus  Lines: PIV  Major Catheter: Not present    DVT Prophylaxis: Enoxaparin 40mg every day subq  Code Status: Full Code       [employer]  Medical Student (MS3)  [employer]    Pt staffed with Dr. Enoc Robledo MD who agrees with this plan.     Kathy Brasher DO   Internal Medicine PGY2  [employer] [facility]  Please use on call [employer] for paging    Interval History   Doretha was interviewed in her new room. She had been sleeping. Feels fine with being woken up  because it is getting later and was already woken up early by another team. She is feeling better today with some resolution in her abdominal pain and cough. Was able dislodge a decent amount of sputum overnight but could not collect much. Still having nausea but was able to eat breakfast and dinner yesterday. Endorses eating a full omelette as well as turkey with mashed potatoes. Will have some snacks during lunch time. Continues to state that she does not want a feeding tube or TPN. Mouth ulcers and hang nails are stable.    The team came back later in the day to check in. She is largely unchanged. Able to eat the entirety of her omelette today and some breakfast potatoes. During the meal she got nausea and had to stop but did not vomit.     Physical Exam   Vital Signs: Temp: 97.9  F (36.6  C) Temp src: Oral BP: 103/61 Pulse: 78   Resp: 16 SpO2: 95 % O2 Device: None (Room air)    Weight: 198 lbs 3.2 oz    General: Pt laying comfortably in bed, in no acute distress. Not requiring oxygen.   Cardio: Regular rate and rhythm. No murmurs, gallops, rubs.   Pulm: All lung fields clear without wheezes, crackles, or rhonchi.    Abd: Active bowel sounds. Tenderness to palpation in upper and lower quadrants, more severe along midline, minimal guarding or rebound.   HEENT: White ulcers on upper and lower lip ~1cm, 0.5cm bilateral tongue.  MSK: Erythematous presumed hang nail lesions on several fingers of both hands, no visible drainage.    Lower extremities: No lower extremity edema present bilaterally.   Neuro: Alert and oriented to person, place, and time.   Psych: Appropriate mood and affect.     Data   Recent Labs   Lab 11/03/23  0836 11/03/23  0607 11/03/23  0135 11/02/23  0805 11/02/23  0659 11/01/23  0812 11/01/23  0701 10/30/23  1437 10/30/23  1436   WBC  --  5.7  --   --  6.2  --  5.5   < >  --    HGB  --  9.4*  --   --  10.6*  --  10.4*   < >  --    MCV  --  85  --   --  86  --  87   < >  --    PLT  --  295  --   --   278  --  236   < >  --    INR  --   --   --   --   --   --   --   --  1.07   NA  --  140  --   --  140  --  139   < > 131*   POTASSIUM  --  3.4  --   --  3.4  --  3.7   < > 3.5   CHLORIDE  --  106  --   --  103  --  104   < > 96*   CO2  --  24  --   --  26  --  24   < > 21*   BUN  --  6.0  --   --  3.5*  --  3.1*   < > 3.4*   CR  --  0.83  --   --  0.79  --  0.72   < > 0.83   ANIONGAP  --  10  --   --  11  --  11   < > 14   PATRICIA  --  8.7  --   --  9.1  --  8.7   < > 8.8   GLC 80 81 136*   < > 95   < > 90   < > 132*   ALBUMIN  --  3.0*  --   --  3.4*  --  3.0*   < > 3.8   PROTTOTAL  --  5.6*  --   --  6.6  --  5.8*   < > 7.1   BILITOTAL  --  <0.2  --   --  <0.2  --  <0.2   < > 0.2   ALKPHOS  --  168*  --   --  193*  --  181*   < > 258*   ALT  --  50  --   --  68*  --  69*   < > 146*   AST  --  27  --   --  29  --  33   < > 92*    < > = values in this interval not displayed.       Imaging results reviewed over the past 24 hrs:   No results found for this or any previous visit (from the past 24 hour(s)).

## 2023-11-03 NOTE — PLAN OF CARE
No acute events this shift. Alert and oriented x4, pleasant calm, ambulatory and cooperative with cares. Ileostomy bag in place, patent, still having increased output. Was seen and rounded by primary team as well as Infectious disease. She complained of abdominal pain and was given oxycodone thereafter which offered good relief. Relief from nausea obtained after pt was given Zofran. Placed on strict I and O. LR bolus administered slow due to patient having sensitive/fragile veins. Still on Zosyn and Vibramycin for IV abx, Resting in bed as of this time. No other calls made.

## 2023-11-03 NOTE — PLAN OF CARE
Problem: Adult Inpatient Plan of Care  Goal: Plan of Care Review  Description: The Plan of Care Review/Shift note should be completed every shift.  The Outcome Evaluation is a brief statement about your assessment that the patient is improving, declining, or no change.  This information will be displayed automatically on your shift  note.  Flowsheets (Taken 11/3/2023 0149)  Outcome Evaluation:   Continued POC   Pt makes needs known   PTA services include home infusion for mIVF (x3/week)   Remption orders placed   RNCC to continue to follow  Plan of Care Reviewed With: patient  Overall Patient Progress: improving

## 2023-11-03 NOTE — PROGRESS NOTES
CRS update    Discussed with bedside nurse.    Likely 400cc out the ileostomy from midnight to 7am on 11/3/2023.

## 2023-11-03 NOTE — PROGRESS NOTES
Colorectal Surgery Progress Note  [facility]    Subjective:  tolerated diet yesterday.  Since restarting imodium, ileo outputs are thickening up.    Vitals:  Vitals:    11/02/23 0704 11/02/23 1330 11/02/23 2145 11/03/23 0511   BP: 99/59 96/62 97/57 103/61   BP Location: Right arm Right arm Right arm Right arm   Pulse: 80 76 77 78   Resp: 16 16 16 16   Temp: 98.9  F (37.2  C) 98.2  F (36.8  C) 97.9  F (36.6  C) 97.9  F (36.6  C)   TempSrc: Oral Oral Oral Oral   SpO2: 98% 97% 96% 95%   Weight:  89.9 kg (198 lb 3.2 oz)       I/O:  I/O last 3 completed shifts:  In: 120 [P.O.:120]  Out: 975 [Stool:975]    Physical Exam:  Gen: AAOx3, NAD - looks better and brighter today.   Pulm: Non-labored breathing.  + cough continues this morning.   Abd: Soft, non-distended, mildly tender diffusely.     Stoma pink and viable with applesauce consistency of output but also some liquid   Ext:  Warm and well-perfused    BMP  Recent Labs   Lab 11/03/23  0836 11/03/23  0607 11/03/23  0135 11/02/23  2143 11/02/23  0805 11/02/23  0659 11/01/23  0812 11/01/23  0701 10/31/23  0852 10/31/23  0556 10/30/23  2141 10/30/23  1436 10/27/23  2000   NA  --  140  --   --   --  140  --  139  --  137   < > 131* 133*   POTASSIUM  --  3.4  --   --   --  3.4  --  3.7  --  3.7  --  3.5 4.1   CHLORIDE  --  106  --   --   --  103  --  104  --  101  --  96* 97*   CO2  --  24  --   --   --  26  --  24  --  26  --  21* 18*   BUN  --  6.0  --   --   --  3.5*  --  3.1*  --  3.7*  --  3.4* 14.7   CR  --  0.83  --   --   --  0.79  --  0.72  --  0.83   < > 0.83 1.01*   GLC 80 81 136* 160*   < > 95   < > 90   < > 99   < > 132* 146*   MAG  --   --   --   --   --  1.8  --  1.6*  --   --   --  1.6* 1.6*   PHOS  --   --   --   --   --   --   --   --   --   --   --   --  3.6    < > = values in this interval not displayed.     CBC  Recent Labs   Lab 11/03/23  0607 11/02/23  0659 11/01/23  0701 10/31/23  0556   WBC 5.7 6.2 5.5 6.3   HGB 9.4*  10.6* 10.4* 10.8*   HCT 29.8* 33.7* 32.7* 33.5*    278 236 249         ASSESSMENT: 48 year old female, PMH of UC (possibly 2/2 immunotherapy colitis from history of melanoma) s/p TAC w/ EI in 6/2021 with parastomal hernia, followed by robotic proctectomy, end ileostomy take down, formation of j-pouch and diverting loop ileostomy in 9/2023.  Pt has had a tough post-op course and has had high ileostomy outputs requiring outpatient IV fluids.  Now admitted on 10/30 with 3-4 days of fever, increased ileostomy outputs and continued J-pouch drainage.       COVID, RSV, Flu, Cdiff (ostomy specimen), enteric panel (ostomy specimen), legionella, strep pneumo negative.     - greatly appreciate Medicine and GI management  - strict in and outs of all ileostomy output, J-pouch output, urine  - continue imodium 4mg BID   - continue regular diet  - Fluid resuscitate.   - apprec WOCN  - apprec dietician recs.  Rec reg diet w/ supplements.  If not able to maintain adequate caloric intake, we would likely recommend feeding tube as next step  - is scheduled for ileostomy take down with Dr. Ram in Dec.   - agree with protonix BID  - discussed with medicine team  Dispo:  should continue with outpatient IVF three times per week and weekly labs (CRS ordered weekly outpatient labs)  Have placed CRS discharge recommendations and CRS will coordinate clinic follow up in 2-3 weeks.      Judi Sanabria PA-C ..................11/3/2023   7:49 AM  Colon and Rectal Surgery    Patient was seen and discussed with Dr. Varela    The above plan of care was performed and communicated to me by Dr. Ram    I spent 35 minute face-to-face or coordinating care of Dorethaanna Yu. Over 50% of our time on the unit was spent counseling the patient and/or coordinating care as documented in the assessment and plan.

## 2023-11-03 NOTE — PROGRESS NOTES
[facility] Nurse Inpatient Assessment     Consulted for: diverting loop ileostomy       Patient History (according to provider note(s):      48 year old female, PMH of UC (possibly 2/2 immunotherapy colitis from history of melanoma) s/p TAC w/ EI in 6/2021 with parastomal hernia, followed by robotic proctectomy, end ileostomy take down, formation of j-pouch and diverting loop ileostomy in 9/2023.  Pt has had a tough post-op course and has had high ileostomy outputs requiring outpatient IV fluids.  Now admitted on 10/30 with 3-4 days of fever, increased ileostomy outputs and continued J-pouch drainage.     Assessment:      Areas visualized during today's visit: Abdomen    Assessment of Established loop Ileostomy:  How long has patient had ostomy: 9/2023  Stoma: retracted/flat, divot at 7 o clock   Mucutaneous junction: intact  Peristomal skin: maceration slight rash at edges of where pouch wafer is applied, mild   Output: watery, liquid, and undigested food     Is patient independent with ostomy care?: Yes  Home pouching system: deep convex coloplast fecal pouch with silvia ring, brava strips and belt    Pouching issues and/or educational needs: significant output causing difficulty sleeping due to need to empty all the time   Interventions completed today: Pouching system assessment   Pouching system in use while hospitalized:  Coloplast one piece, convex, barrier ring, and ostomy strips-- now trialing high output concha pouch with bedside drainage bag, one piece soft convex with coloplast convex barrier ring, silvia ring, brava strips and belt  Supplies location: gathered and at bedside     Met with patient, stating having to get up every half hour to empty leading to lack of sleep and exhaustion, brought patient samples of high output pouches that connect to bedside drainage bag, patient is interested in trying them, however, wants to wait until has a room and is  "not in the ED to change pouch, WOC will return tomorrow for re-assessment, gave patient a few extra pouches of usual supplies in case any leaks occur overnight--high output system placed 11/2, so far seems to be holding well, WOC will check on pouch tomorrow to ensure continued seal     11/3: pouch seal intact and system is working well, reminded patient that she can disconnect from bedside bag when not in bed    Treatment Plan:     RLQ Ileostomy pouching plan:   Pouching system: ostomy supplies pouches: convex high output (special order)    Accessories used: [facility] ostomy accessories: 2\" Sarah Ring (002871), Large Belt (908765), Brava Barrier Strip (476608), and coloplast convex barrier ring (special order)    Frequency of pouch changes: Twice weekly and PRN leakage  WOC follow up plan: Weekly   Bedside RN interventions: Change pouch PRN if leaking using the supplies above, Empty pouch when 1/3 to 1/2 full, ensure to clean pouch outlet after emptying to prevent odor, Notify WOC for ongoing pouch leakage, and Document stoma appearance and output volume, color, and consistency every shift     DME for high output system:  Request the following supplies:      [place]    1 piece soft convex high output drainable soft tap cut to fit up to 1-1/2 inch #95018    Accessories  [place] high output collection bag #5551  2  Sarah ring #[id number]     Coloplast Brava convex barrier ring 1 inch #94205    Adapt powder #7906    No sting film barrier # 9252                          Brava elastic barrier strips curved # [id number]    Keith belt large #7009 (29-49 )       Orders: Written    RECOMMEND PRIMARY TEAM ORDER: None, at this time  Education provided: plan of care  Discussed plan of care with: Patient  WOC nurse follow-up plan: twice weekly  Notify WOC if wound(s) deteriorate.  Nursing to notify the Provider(s) and re-consult the WOC Nurse if new skin concern.    DATA:     Current support surface: Standard  Standard gel/foam " mattress (IsoFlex, Atmos air, etc)  Containment of urine/stool: Ileostomy pouch  BMI: Body mass index is 36.27 kg/m .   Active diet order: Orders Placed This Encounter      Gluten Free Diet     Output: I/O last 3 completed shifts:  In: 120 [P.O.:120]  Out: 975 [Stool:975]     Labs:   Recent Labs   Lab 11/03/23  0607 10/30/23  1437 10/30/23  1436   ALBUMIN 3.0*   < > 3.8   HGB 9.4*   < >  --    INR  --   --  1.07   WBC 5.7   < >  --     < > = values in this interval not displayed.     Pressure injury risk assessment:   Sensory Perception: 4-->no impairment  Moisture: 4-->rarely moist  Activity: 3-->walks occasionally  Mobility: 4-->no limitation  Nutrition: 2-->probably inadequate  Friction and Shear: 3-->no apparent problem  Lv Score: 20      Pager no longer is use, please contact through Patricia Gomez group: [facility] Nurse [place]   Dept. Office Number: [phone]

## 2023-11-03 NOTE — PLAN OF CARE
Goal Outcome Evaluation:      Plan of Care Reviewed With: patient    Overall Patient Progress: no changeOverall Patient Progress: no change     Pt A&Ox4, VSS on RA. Pain m,anaged with Dilaudid, Oxy and tylenol. IV Zofran admisntered x1 with relief. Ostomy WNL, adequate output. BG stable. Managed independently. Iv abx continued. PIV TKO. Printer failed for Sputum collection, need a new order for labels. Team paged, no need for sputum as of now. No other changes POC continued.

## 2023-11-04 ENCOUNTER — APPOINTMENT (OUTPATIENT)
Dept: ULTRASOUND IMAGING | Facility: CLINIC | Age: 48
DRG: 385 | End: 2023-11-04
Payer: COMMERCIAL

## 2023-11-04 LAB
ALBUMIN SERPL BCG-MCNC: 3 G/DL (ref 3.5–5.2)
ALP SERPL-CCNC: 155 U/L (ref 35–104)
ALT SERPL W P-5'-P-CCNC: 41 U/L (ref 0–50)
ANION GAP SERPL CALCULATED.3IONS-SCNC: 8 MMOL/L (ref 7–15)
AST SERPL W P-5'-P-CCNC: 23 U/L (ref 0–45)
BACTERIA BLD CULT: NO GROWTH
BACTERIA BLD CULT: NO GROWTH
BILIRUB SERPL-MCNC: <0.2 MG/DL
BUN SERPL-MCNC: 6.5 MG/DL (ref 6–20)
CALCIUM SERPL-MCNC: 8.8 MG/DL (ref 8.6–10)
CHLORIDE SERPL-SCNC: 106 MMOL/L (ref 98–107)
CREAT SERPL-MCNC: 0.74 MG/DL (ref 0.51–0.95)
DEPRECATED HCO3 PLAS-SCNC: 26 MMOL/L (ref 22–29)
EGFRCR SERPLBLD CKD-EPI 2021: >90 ML/MIN/1.73M2
ERYTHROCYTE [DISTWIDTH] IN BLOOD BY AUTOMATED COUNT: 16.4 % (ref 10–15)
GLUCOSE BLDC GLUCOMTR-MCNC: 104 MG/DL (ref 70–99)
GLUCOSE BLDC GLUCOMTR-MCNC: 116 MG/DL (ref 70–99)
GLUCOSE BLDC GLUCOMTR-MCNC: 132 MG/DL (ref 70–99)
GLUCOSE BLDC GLUCOMTR-MCNC: 85 MG/DL (ref 70–99)
GLUCOSE BLDC GLUCOMTR-MCNC: 90 MG/DL (ref 70–99)
GLUCOSE SERPL-MCNC: 82 MG/DL (ref 70–99)
HCT VFR BLD AUTO: 30.3 % (ref 35–47)
HGB BLD-MCNC: 9.5 G/DL (ref 11.7–15.7)
MCH RBC QN AUTO: 27 PG (ref 26.5–33)
MCHC RBC AUTO-ENTMCNC: 31.4 G/DL (ref 31.5–36.5)
MCV RBC AUTO: 86 FL (ref 78–100)
PLATELET # BLD AUTO: 324 10E3/UL (ref 150–450)
POTASSIUM SERPL-SCNC: 3.5 MMOL/L (ref 3.4–5.3)
PROT SERPL-MCNC: 5.5 G/DL (ref 6.4–8.3)
RBC # BLD AUTO: 3.52 10E6/UL (ref 3.8–5.2)
SODIUM SERPL-SCNC: 140 MMOL/L (ref 135–145)
WBC # BLD AUTO: 6.8 10E3/UL (ref 4–11)

## 2023-11-04 PROCEDURE — 99232 SBSQ HOSP IP/OBS MODERATE 35: CPT | Performed by: STUDENT IN AN ORGANIZED HEALTH CARE EDUCATION/TRAINING PROGRAM

## 2023-11-04 PROCEDURE — 250N000011 HC RX IP 250 OP 636

## 2023-11-04 PROCEDURE — 93971 EXTREMITY STUDY: CPT | Mod: 26 | Performed by: RADIOLOGY

## 2023-11-04 PROCEDURE — 250N000013 HC RX MED GY IP 250 OP 250 PS 637

## 2023-11-04 PROCEDURE — 93971 EXTREMITY STUDY: CPT | Mod: RT

## 2023-11-04 PROCEDURE — 250N000013 HC RX MED GY IP 250 OP 250 PS 637: Performed by: STUDENT IN AN ORGANIZED HEALTH CARE EDUCATION/TRAINING PROGRAM

## 2023-11-04 PROCEDURE — 250N000011 HC RX IP 250 OP 636: Mod: JZ | Performed by: STUDENT IN AN ORGANIZED HEALTH CARE EDUCATION/TRAINING PROGRAM

## 2023-11-04 PROCEDURE — 80053 COMPREHEN METABOLIC PANEL: CPT

## 2023-11-04 PROCEDURE — C9113 INJ PANTOPRAZOLE SODIUM, VIA: HCPCS | Mod: JZ

## 2023-11-04 PROCEDURE — 36415 COLL VENOUS BLD VENIPUNCTURE: CPT

## 2023-11-04 PROCEDURE — 85027 COMPLETE CBC AUTOMATED: CPT

## 2023-11-04 PROCEDURE — 120N000002 HC R&B MED SURG/OB UMMC

## 2023-11-04 PROCEDURE — 250N000011 HC RX IP 250 OP 636: Mod: JZ

## 2023-11-04 RX ORDER — ONDANSETRON 4 MG/1
4 TABLET, FILM COATED ORAL EVERY 6 HOURS PRN
Status: DISCONTINUED | OUTPATIENT
Start: 2023-11-04 | End: 2023-11-08 | Stop reason: HOSPADM

## 2023-11-04 RX ORDER — PANTOPRAZOLE SODIUM 40 MG/1
40 TABLET, DELAYED RELEASE ORAL
Status: DISCONTINUED | OUTPATIENT
Start: 2023-11-04 | End: 2023-11-08 | Stop reason: HOSPADM

## 2023-11-04 RX ADMIN — ENOXAPARIN SODIUM 40 MG: 40 INJECTION SUBCUTANEOUS at 13:57

## 2023-11-04 RX ADMIN — PANTOPRAZOLE SODIUM 40 MG: 40 TABLET, DELAYED RELEASE ORAL at 16:46

## 2023-11-04 RX ADMIN — LOPERAMIDE HYDROCHLORIDE 4 MG: 2 CAPSULE ORAL at 16:46

## 2023-11-04 RX ADMIN — HYDROMORPHONE HYDROCHLORIDE 0.5 MG: 1 INJECTION, SOLUTION INTRAMUSCULAR; INTRAVENOUS; SUBCUTANEOUS at 03:03

## 2023-11-04 RX ADMIN — VENLAFAXINE 150 MG: 75 TABLET ORAL at 09:05

## 2023-11-04 RX ADMIN — GUAIFENESIN AND DEXTROMETHORPHAN 10 ML: 100; 10 SYRUP ORAL at 21:05

## 2023-11-04 RX ADMIN — OXYCODONE HYDROCHLORIDE 5 MG: 5 TABLET ORAL at 07:05

## 2023-11-04 RX ADMIN — LOPERAMIDE HYDROCHLORIDE 4 MG: 2 CAPSULE ORAL at 09:06

## 2023-11-04 RX ADMIN — VENLAFAXINE 150 MG: 75 TABLET ORAL at 19:57

## 2023-11-04 RX ADMIN — ACETAMINOPHEN 650 MG: 325 TABLET, FILM COATED ORAL at 07:05

## 2023-11-04 RX ADMIN — ONDANSETRON 4 MG: 2 INJECTION INTRAMUSCULAR; INTRAVENOUS at 07:11

## 2023-11-04 RX ADMIN — PSYLLIUM HUSK 1 PACKET: 3.4 POWDER ORAL at 12:59

## 2023-11-04 RX ADMIN — ONDANSETRON HYDROCHLORIDE 4 MG: 4 TABLET, FILM COATED ORAL at 14:46

## 2023-11-04 RX ADMIN — ARIPIPRAZOLE 2 MG: 2 TABLET ORAL at 09:06

## 2023-11-04 RX ADMIN — OXYCODONE HYDROCHLORIDE 5 MG: 5 TABLET ORAL at 19:57

## 2023-11-04 RX ADMIN — ACETAMINOPHEN 650 MG: 325 TABLET, FILM COATED ORAL at 13:56

## 2023-11-04 ASSESSMENT — ACTIVITIES OF DAILY LIVING (ADL)
ADLS_ACUITY_SCORE: 20

## 2023-11-04 NOTE — PLAN OF CARE
Goal Outcome Evaluation:      Plan of Care Reviewed With: patient    Overall Patient Progress: improvingOverall Patient Progress: improving    Outcome Evaluation: A/Ox4. VSS on RA. IV dilaudid given x1 for abd pain. Up ab miriam. IV antibiotics continued. No acute changes overnight.

## 2023-11-04 NOTE — PLAN OF CARE
Goal Outcome Evaluation:      Plan of Care Reviewed With: patient    Overall Patient Progress: improving    Pt admitted for increased ileostomy output and fever. Output seems to have decreased slightly, getting imodium.No fever this shift. Went to flush PIV this morning, pt  c/o pain, swelling, and warmth of extremity. PIV was removed and team okay with patient not having any access. US found venous thromboses of R basilic and cephalic veins. Okay appeitite, does not like hospital food. On clint counts with BG ACHS. Did c/o nausea was given PRN Zofran. Took shower and is up ad miriam.

## 2023-11-04 NOTE — CARE PLAN
Vitals: /65 (BP Location: Right arm)   Pulse 67   Temp 98.1  F (36.7  C) (Oral)   Resp 16   Wt 89.9 kg (198 lb 4.8 oz)   SpO2 97%   BMI 36.27 kg/m    Time: 3261-6979  Neuro: A/O x 4, calls appropriately and makes needs known.  Activity: up ad miriam   Pain and N/V: abdominal pain managed with oxycodone.  GI/: No BM this shift, Voiding Ileostomy to the to bag.   Cardiac: Denies cardiac chest pain.   Diet: Regular.   Respiratory: Denies SOB, on RA  Lines: R PIV infusing TKO and intermittent abx.  Incisions/Drains/Skin: WDL, No new deficit.   Lab: Reviewed.  New changes this shift: No significant changes this shift, Continue POC.

## 2023-11-04 NOTE — PROGRESS NOTES
Physician Attestation   I, NOMRA HELM MD, was present with the medical/ASHANTI student who participated in the service and in the documentation of the note.  I have verified the history and personally performed the physical exam and medical decision making.  I agree with the assessment and plan of care as documented in the note.          NORMA HELM MD  Date of Service (when I saw the patient): 11/04/23     [facility]    Internal Medicine Progress Note - Maroon 2 Service    Main Plans for Today   - Discontinued Abx  - CR surgery recs  - Fiber added by CR surgery  - Imodium be given 30 min before meal  - PPI changed from IV to oral  - US R arm to rule out DVT    Assessment & Plan   Doretha Yu is a 48 year old female admitted on 10/30/2023. 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 Pt s/p TAC with end ileostomy (laparoscopic) on 6/15/21 with subsequent parastomal hernia now s/p robotic protectectomy, ileostomy takedown, and creation of J-pouch and diverting loop ileostomy on 9/5/23. 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 Pt presents with fever x 3 days and increased ostomy output x 2 weeks, admitted at recommendation of GI. Pt states that she has had increased ostomy output for the last two weeks. Fever for the last 3 days. Pt states she has had very poor po intake for the last few days. No urinary sx, shortness of breath, cough. No recent travel. Lives with 17 y/o daughter. No recent abx.     Nivolumab induced UC  Increased ostomy output  Severe protein calorie malnutrition   H/o nivolumab-induced ulcerative colitis s/p TAC with end ileostomy (laparoscopic) on 6/15/21 with subsequent parastomal hernia now s/p robotic protectectomy,  ileostomy takedown, and creation of J-pouch and diverting loop ileostomy on 9/5/23. Pt states since this time she has noticed increased ostomy output, but in the last two weeks much more so with correlating reduced PO intake. Has been seen in the ED on several occasions and has been getting OP IVF at an infusion center as output so significant. No blood in output. No recent abx.      FLUIDS              - 1L LR     WORKUP              - Per GI as below                      - Blood cultures have not shown growth               - C. Diff: Negative              - UA moderate LE and few bacteria, UC mixed urogenital herlinda              - Amylase: WNL              - Urine legionella: Negative   - Enteric Bacteria and Virus: Negative   - Fecal calprotectin: WNL  - CT enterography: LLL consolidation, surgical changes of total colectomy and completion proctectomy w/R quadrant diverting ileostomy w/o evidence of infectious or inflammatory bowel pathology    ABX              - Zosyn 10/30-11/03     CONSULTS              - GI consulted, appreciate recs  - Infectious Stool Studies: C. Difficile Toxin B PCR with reflex to C. Difficile Antigen and Toxins A/B EIA, Enteric Bacteria and Virus Panel PCR  - Fecal Calprotectin  - Trend daily labs: CBC and CMP  - CT Enterography for further assessment of bowel, infection/abscess, or other intra-abdominal pathology  - IV Pantoprazole 40 mg BID  - Loperamide 4mg BID, give 30 min before meals  - Avoid artificial sugars   - Strict documentation of I/O  - Defer to CR surgery for post-operative loose stool management   - VTE prophylaxis   - Health psychology consult for coping strategies  - Psychiatry consult for mental health medical management  - Output goal < 1L/day  - Attempt regular gluten free diet, but if not maintaining PO, next consideration would be [place]   - Ensure Max Protein Supplements   - Resume PTA fluids   - Follow up in 4 weeks with [facility]    - Colorectal surgery consulted,  appreciate recs   - Agree with strict documentation of I/O  - Dietician consult: Education, medical food supplementation therapy   - WOCN for ostomy supplies: Patient given deep convex coloplast fecal pouches with silvia rings and comfeel to use as brava strips   - Recommends feeding tube next if she cannot maintain adequate caloric intake  - Added fiber 11/04      Pneumonia  CT enterography shows possible infiltrate LLL, well covered with zosyn, will add azithromycin for atypical coverage while undergoing workup. Urine Legionella and Strep as well as respiratory viral panel negative.   - Zosyn as above + doxycycline added 500 mg (10/30-11/03)     Hypomagnesemia  Likely 2/2 diarrhea.  -Resolved at 1.8 on 11/02 with RN magnesium protocol, will continue for now    Aphthous ulcers  Sores on upper lip, lower lip, and L side of tongue. Endorses these occur when she is sick. May be stress induced or another manifestation of her autoimmune illnesses.   - Consult dental, recs appreciated   - Biotene   - Lidocaine swish and spit   - Triamcinolone paste    Hyponatremia  Likely hypovolemic hyponatremia in setting of increased UOP. To be complete can get baseline studies. Urine Legionella negative. Osmolality decreased in blood 271 and urine 86.   - Resolved      Chronic Transaminitis  - Pending above workup  - Hepatitis panel negative   - Resolving with hydration     Depression  Anxiety  Patient endorses struggle coping with chemotherapy-induced autoimmune diseases that have led to GI surgeries and complications. Depression and anxiety are uncontrolled even on EFFEXOR (generic venlafaxine is refused). Has asked for help from psych when talking to GI.   - Currently PTA venlafaxine 150mg BID  - Consult psychology for coping strategies and support   - Will follow 1x/week  - Consult psychiatry with help with meds, recs appreciated   - Add aripiprazole 2mg po qam      DM2  A1c 6.5% last 10/25. Takes metformin 500 BID as OP.   - Hold  metformin  - LDSSI     Hypothyroidism  - Do not see PTA meds on list, can ask if she takes in AM     Normocytic Anemia  Baseline Hb 13, here with Hb of 9.5 on 11/04. Plts wnl.      STORMY  - CPAP     Chronic Pain  - APAP  - oxycodone 5 mg q 4  - Dilaudid 0.2-0.5 q 3 for short term only    Prothrombin Mutation  She had genetic testing done back when her first child was born and she had a CVA. It showed prothrombin mutation, not deficiency (deficiency may have been accidentally charted by others at some point). As a result, she was placed on prophylactic aspirin but was taken off when the UC symptoms began. This mutation makes her relatively more prothrombotic, will continue Lovenox IP as IBD makes you more thrombotic.   - Consider hematology as outpatient due to hypercoagulability with prothrombin mutation + IBD     Melanoma history:   - see H&P subjective section for full history, follows with [facility].     DISCHARGE PLANNING:     NEW MEDS:   -    MEDS TO STOP/HOLD:   - Hold hydroxyzine   - Hold methocarbamol     FOLLOWUP:   - Follow up in 4 weeks with [facility]  - Scheduled for upcoming Ileostomy closure on 12/27/2023 with Dr. Ram   - Consider hematology as outpatient due to hypercoagulability with prothrombin mutation + IBD    Diet: Snacks/Supplements Adult: Other; With all meals please send: 1 beneprotein powder, 1 Banatrol Plus, 1 greek yogurt; With Meals  Snacks/Supplements Adult: PatientsLikeMe Standard 1.4 Oral Supplement; With Meals  Snacks/Supplements Adult: PatientsLikeMe Standard Oral Supplement; With Meals  Gluten Free Diet  Calorie Counts  Fluids: 125cc/hr discontinued, now bolus  Lines: PIV  Major Catheter: Not present    DVT Prophylaxis: Enoxaparin 40mg every day subq  Code Status: Full Code       Seven [place]  Medical Student (MS3)  [employer]    Pt staffed with Dr. Enoc Robledo MD who agrees with this plan.       Interval History   Doretha was interviewed in her room. She  feels she is doing a little worse today and attributes it to her cough which has caused rib soreness and a worse than normal headache. She feels like currently her pain is not controlled. In terms of abdominal pain it is still present but not any worse. She had a good appetite yesterday but still noticing elevated output. She states that she agrees with CR surgery that it is becoming more solid with the increase in loperamide but she sometimes sees chunks of food that seem undigested. She is hungry now and will order breakfast. Another concern is that her R arm (peripheral line present) is edematous and seems warmer to her. The nurse rocío a [place] around a patch of erythema yesterday on the dorsum of the upper arm; however, there is no significant erythema present this morning. Asks if we can put together a form/email to extend her FMLA. Continues to feel like she has fluid buildup but agrees when LE edema is tested that there is no pitting edema. No other concerns.     The team came back later in the day to inform her that antibiotics are being discontinued and we will move the PPI to oral. We will also get ultrasound to take a look at her RUE and make sure she does not have a DVT. She is agreeable to this and interested in preparations for discharge. Will continue trying her best to eat. No other concerns.     Physical Exam   Vital Signs: Temp: 98.3  F (36.8  C) Temp src: Oral BP: 113/71 Pulse: 78   Resp: 16 SpO2: 97 % O2 Device: None (Room air)    Weight: 198 lbs 4.8 oz    General: Pt laying comfortably in bed, in no acute distress. Not requiring oxygen.   Cardio: Regular rate and rhythm. No murmurs, gallops, rubs.   Pulm: All lung fields clear without wheezes, crackles, or rhonchi.    Abd: Active bowel sounds. Tenderness to palpation in upper and lower quadrants, more severe along midline, minimal guarding or rebound.   HEENT: White ulcers on upper and lower lip ~1cm, 0.5cm bilateral tongue.  MSK: Erythematous  presumed hang nail lesions on several fingers of both hands, no visible drainage. RUE noted to be more edematous than the L with a 3x3cm bruise near site of peripheral line. The edematous region is not erythematous or warm and has mild tenderness to palpation.   Lower extremities: No lower extremity edema present bilaterally.   Neuro: Alert and oriented to person, place, and time.   Psych: Appropriate mood and affect.     Data   Recent Labs   Lab 11/04/23  0635 11/04/23  0316 11/03/23  2119 11/03/23  0836 11/03/23  0607 11/02/23  0805 11/02/23  0659 10/30/23  1437 10/30/23  1436   WBC 6.8  --   --   --  5.7  --  6.2   < >  --    HGB 9.5*  --   --   --  9.4*  --  10.6*   < >  --    MCV 86  --   --   --  85  --  86   < >  --      --   --   --  295  --  278   < >  --    INR  --   --   --   --   --   --   --   --  1.07     --   --   --  140  --  140   < > 131*   POTASSIUM 3.5  --   --   --  3.4  --  3.4   < > 3.5   CHLORIDE 106  --   --   --  106  --  103   < > 96*   CO2 26  --   --   --  24  --  26   < > 21*   BUN 6.5  --   --   --  6.0  --  3.5*   < > 3.4*   CR 0.74  --   --   --  0.83  --  0.79   < > 0.83   ANIONGAP 8  --   --   --  10  --  11   < > 14   PATRICIA 8.8  --   --   --  8.7  --  9.1   < > 8.8   GLC 82 85 130*   < > 81   < > 95   < > 132*   ALBUMIN 3.0*  --   --   --  3.0*  --  3.4*   < > 3.8   PROTTOTAL 5.5*  --   --   --  5.6*  --  6.6   < > 7.1   BILITOTAL <0.2  --   --   --  <0.2  --  <0.2   < > 0.2   ALKPHOS 155*  --   --   --  168*  --  193*   < > 258*   ALT 41  --   --   --  50  --  68*   < > 146*   AST 23  --   --   --  27  --  29   < > 92*    < > = values in this interval not displayed.       Imaging results reviewed over the past 24 hrs:   No results found for this or any previous visit (from the past 24 hour(s)).

## 2023-11-04 NOTE — PROGRESS NOTES
Colorectal Surgery Progress Note  [facility]    Subjective: Tolerating diet, ostomy output of 1 L  Vitals:  Vitals:    11/03/23 1100 11/03/23 1358 11/03/23 2121 11/04/23 0700   BP:  114/66 105/65 113/71   BP Location:  Right arm Right arm Right arm   Pulse:  73 67 78   Resp:  16 16 16   Temp:  98.2  F (36.8  C) 98.1  F (36.7  C) 98.3  F (36.8  C)   TempSrc:  Oral Oral Oral   SpO2:  97% 97% 97%   Weight: 89.9 kg (198 lb 4.8 oz)        I/O:  I/O last 3 completed shifts:  In: 2020 [P.O.:2020]  Out: 1125 [Stool:1125]    Physical Exam:  Gen: AAOx3, NAD - looks better and brighter today.   Pulm: Non-labored breathing.  + cough continues this morning.   Abd: Soft, non-distended, mildly tender diffusely.     Stoma pink and viable with applesauce consistency of output but also some liquid   Ext:  Warm and well-perfused    BMP  Recent Labs   Lab 11/04/23  0854 11/04/23  0635 11/04/23  0316 11/03/23  2119 11/03/23  0836 11/03/23  0607 11/02/23  0805 11/02/23  0659 11/01/23  0812 11/01/23  0701 10/30/23  2141 10/30/23  1436   NA  --  140  --   --   --  140  --  140  --  139   < > 131*   POTASSIUM  --  3.5  --   --   --  3.4  --  3.4  --  3.7   < > 3.5   CHLORIDE  --  106  --   --   --  106  --  103  --  104   < > 96*   CO2  --  26  --   --   --  24  --  26  --  24   < > 21*   BUN  --  6.5  --   --   --  6.0  --  3.5*  --  3.1*   < > 3.4*   CR  --  0.74  --   --   --  0.83  --  0.79  --  0.72   < > 0.83   GLC 90 82 85 130*   < > 81   < > 95   < > 90   < > 132*   MAG  --   --   --   --   --   --   --  1.8  --  1.6*  --  1.6*    < > = values in this interval not displayed.     CBC  Recent Labs   Lab 11/04/23  0635 11/03/23  0607 11/02/23  0659 11/01/23  0701   WBC 6.8 5.7 6.2 5.5   HGB 9.5* 9.4* 10.6* 10.4*   HCT 30.3* 29.8* 33.7* 32.7*    295 278 236         ASSESSMENT: 48 year old female, PMH of UC (possibly 2/2 immunotherapy colitis from history of melanoma) s/p TAC w/ EI in 6/2021 with  parastomal hernia, followed by robotic proctectomy, end ileostomy take down, formation of j-pouch and diverting loop ileostomy in 9/2023.  Pt has had a tough post-op course and has had high ileostomy outputs requiring outpatient IV fluids.  Now admitted on 10/30 with 3-4 days of fever, increased ileostomy outputs and continued J-pouch drainage.       COVID, RSV, Flu, Cdiff (ostomy specimen), enteric panel (ostomy specimen), legionella, strep pneumo negative.     - greatly appreciate Medicine and GI management  - strict in and outs of all ileostomy output, J-pouch output, urine  -Added fiber today  -Continue current Imodium.  Imodium needs to be given 30 minutes before food  - continue regular diet  - Fluid resuscitate.   - apprec WOCN  - apprec dietician recs.  Rec reg diet w/ supplements.  If not able to maintain adequate caloric intake, we would likely recommend feeding tube as next step  - is scheduled for ileostomy take down with Dr. Ram in Dec.   - agree with protonix BID  - discussed with medicine team  Dispo:  should continue with outpatient IVF three times per week and weekly labs (CRS ordered weekly outpatient labs)  Have placed CRS discharge recommendations and CRS will coordinate clinic follow up in 2-3 weeks.        Montse Hernandez MD  Fellow, Colon and Rectal Surgery  [facility]  Pager: [phone]

## 2023-11-04 NOTE — PROGRESS NOTES
Calorie Count  Intake recorded for: 11/3  Total Kcals: 1518 Total Protein: 38g  Kcals from Hospital Food: 1518  Protein: 38g  Kcals from Outside Food (average):0 Protein: 0g  # Meals Ordered from Kitchen: 3 meals   # Meals Recorded: 2 meals (First - 100% grilled cheese w/ joel, tater tots w/ ketchup, hummus & veggies, Caesar salad w/ dressing, iced tea yesica water)       (Second - 100% black tea, apple jice yesica water, 75% omelet w/ joel, peppers & cheese, 25% cream of rice w/ brown sugar, fried potatoes)  # Supplements Recorded: 0

## 2023-11-05 LAB
ANION GAP SERPL CALCULATED.3IONS-SCNC: 10 MMOL/L (ref 7–15)
BUN SERPL-MCNC: 6.9 MG/DL (ref 6–20)
CALCIUM SERPL-MCNC: 9.1 MG/DL (ref 8.6–10)
CHLORIDE SERPL-SCNC: 107 MMOL/L (ref 98–107)
CREAT SERPL-MCNC: 0.66 MG/DL (ref 0.51–0.95)
DEPRECATED HCO3 PLAS-SCNC: 26 MMOL/L (ref 22–29)
EGFRCR SERPLBLD CKD-EPI 2021: >90 ML/MIN/1.73M2
GLUCOSE BLDC GLUCOMTR-MCNC: 121 MG/DL (ref 70–99)
GLUCOSE BLDC GLUCOMTR-MCNC: 152 MG/DL (ref 70–99)
GLUCOSE BLDC GLUCOMTR-MCNC: 172 MG/DL (ref 70–99)
GLUCOSE BLDC GLUCOMTR-MCNC: 83 MG/DL (ref 70–99)
GLUCOSE SERPL-MCNC: 84 MG/DL (ref 70–99)
HOLD SPECIMEN: NORMAL
MAGNESIUM SERPL-MCNC: 1.6 MG/DL (ref 1.7–2.3)
PHOSPHATE SERPL-MCNC: 4.2 MG/DL (ref 2.5–4.5)
POTASSIUM SERPL-SCNC: 3.6 MMOL/L (ref 3.4–5.3)
SODIUM SERPL-SCNC: 143 MMOL/L (ref 135–145)

## 2023-11-05 PROCEDURE — 250N000011 HC RX IP 250 OP 636: Mod: JZ | Performed by: STUDENT IN AN ORGANIZED HEALTH CARE EDUCATION/TRAINING PROGRAM

## 2023-11-05 PROCEDURE — 250N000013 HC RX MED GY IP 250 OP 250 PS 637

## 2023-11-05 PROCEDURE — 250N000013 HC RX MED GY IP 250 OP 250 PS 637: Performed by: SURGERY

## 2023-11-05 PROCEDURE — 84100 ASSAY OF PHOSPHORUS: CPT

## 2023-11-05 PROCEDURE — 83735 ASSAY OF MAGNESIUM: CPT

## 2023-11-05 PROCEDURE — 120N000002 HC R&B MED SURG/OB UMMC

## 2023-11-05 PROCEDURE — 36415 COLL VENOUS BLD VENIPUNCTURE: CPT

## 2023-11-05 PROCEDURE — 250N000011 HC RX IP 250 OP 636

## 2023-11-05 PROCEDURE — 82310 ASSAY OF CALCIUM: CPT

## 2023-11-05 PROCEDURE — 99232 SBSQ HOSP IP/OBS MODERATE 35: CPT | Performed by: STUDENT IN AN ORGANIZED HEALTH CARE EDUCATION/TRAINING PROGRAM

## 2023-11-05 RX ORDER — LOPERAMIDE HCL 2 MG
4 CAPSULE ORAL
Status: DISCONTINUED | OUTPATIENT
Start: 2023-11-05 | End: 2023-11-06

## 2023-11-05 RX ORDER — NALOXONE HYDROCHLORIDE 0.4 MG/ML
0.4 INJECTION, SOLUTION INTRAMUSCULAR; INTRAVENOUS; SUBCUTANEOUS
Status: DISCONTINUED | OUTPATIENT
Start: 2023-11-05 | End: 2023-11-08 | Stop reason: HOSPADM

## 2023-11-05 RX ORDER — NALOXONE HYDROCHLORIDE 0.4 MG/ML
0.2 INJECTION, SOLUTION INTRAMUSCULAR; INTRAVENOUS; SUBCUTANEOUS
Status: DISCONTINUED | OUTPATIENT
Start: 2023-11-05 | End: 2023-11-08 | Stop reason: HOSPADM

## 2023-11-05 RX ORDER — LOPERAMIDE HCL 2 MG
4 CAPSULE ORAL
Qty: 180 CAPSULE | Refills: 0 | Status: CANCELLED | OUTPATIENT
Start: 2023-11-06 | End: 2023-12-06

## 2023-11-05 RX ADMIN — HYDROMORPHONE HYDROCHLORIDE 1 MG: 2 TABLET ORAL at 19:50

## 2023-11-05 RX ADMIN — ONDANSETRON HYDROCHLORIDE 4 MG: 4 TABLET, FILM COATED ORAL at 09:52

## 2023-11-05 RX ADMIN — HYDROMORPHONE HYDROCHLORIDE 1 MG: 2 TABLET ORAL at 01:59

## 2023-11-05 RX ADMIN — ACETAMINOPHEN 650 MG: 325 TABLET, FILM COATED ORAL at 01:00

## 2023-11-05 RX ADMIN — BENZONATATE 100 MG: 100 CAPSULE ORAL at 16:38

## 2023-11-05 RX ADMIN — VENLAFAXINE 150 MG: 75 TABLET ORAL at 19:49

## 2023-11-05 RX ADMIN — GUAIFENESIN AND DEXTROMETHORPHAN 10 ML: 100; 10 SYRUP ORAL at 09:52

## 2023-11-05 RX ADMIN — ONDANSETRON HYDROCHLORIDE 4 MG: 4 TABLET, FILM COATED ORAL at 16:32

## 2023-11-05 RX ADMIN — PANTOPRAZOLE SODIUM 40 MG: 40 TABLET, DELAYED RELEASE ORAL at 08:46

## 2023-11-05 RX ADMIN — PSYLLIUM HUSK 1 PACKET: 3.4 POWDER ORAL at 08:46

## 2023-11-05 RX ADMIN — INSULIN ASPART 1 UNITS: 100 INJECTION, SOLUTION INTRAVENOUS; SUBCUTANEOUS at 16:32

## 2023-11-05 RX ADMIN — BENZONATATE 100 MG: 100 CAPSULE ORAL at 21:58

## 2023-11-05 RX ADMIN — HYDROMORPHONE HYDROCHLORIDE 1 MG: 2 TABLET ORAL at 06:30

## 2023-11-05 RX ADMIN — PANTOPRAZOLE SODIUM 40 MG: 40 TABLET, DELAYED RELEASE ORAL at 16:32

## 2023-11-05 RX ADMIN — ONDANSETRON HYDROCHLORIDE 4 MG: 4 TABLET, FILM COATED ORAL at 01:00

## 2023-11-05 RX ADMIN — VENLAFAXINE 150 MG: 75 TABLET ORAL at 08:46

## 2023-11-05 RX ADMIN — LOPERAMIDE HYDROCHLORIDE 4 MG: 2 CAPSULE ORAL at 06:30

## 2023-11-05 RX ADMIN — HYDROMORPHONE HYDROCHLORIDE 1 MG: 2 TABLET ORAL at 16:32

## 2023-11-05 RX ADMIN — HYDROMORPHONE HYDROCHLORIDE 1 MG: 2 TABLET ORAL at 23:23

## 2023-11-05 RX ADMIN — ARIPIPRAZOLE 2 MG: 2 TABLET ORAL at 08:46

## 2023-11-05 RX ADMIN — OXYCODONE HYDROCHLORIDE 5 MG: 5 TABLET ORAL at 01:00

## 2023-11-05 RX ADMIN — LOPERAMIDE HYDROCHLORIDE 4 MG: 2 CAPSULE ORAL at 13:20

## 2023-11-05 RX ADMIN — GUAIFENESIN AND DEXTROMETHORPHAN 10 ML: 100; 10 SYRUP ORAL at 21:10

## 2023-11-05 RX ADMIN — ACETAMINOPHEN 650 MG: 325 TABLET, FILM COATED ORAL at 23:23

## 2023-11-05 RX ADMIN — HYDROMORPHONE HYDROCHLORIDE 1 MG: 2 TABLET ORAL at 09:52

## 2023-11-05 RX ADMIN — ENOXAPARIN SODIUM 40 MG: 40 INJECTION SUBCUTANEOUS at 13:26

## 2023-11-05 RX ADMIN — LOPERAMIDE HYDROCHLORIDE 4 MG: 2 CAPSULE ORAL at 16:32

## 2023-11-05 RX ADMIN — ACETAMINOPHEN 650 MG: 325 TABLET, FILM COATED ORAL at 16:32

## 2023-11-05 ASSESSMENT — ACTIVITIES OF DAILY LIVING (ADL)
ADLS_ACUITY_SCORE: 20

## 2023-11-05 NOTE — PROGRESS NOTES
Calorie Count  Intake recorded for: 11/4  Total Kcals: 3145 Total Protein: 115g  Kcals from Hospital Food: 3145   Protein: 115g  Kcals from Outside Food (average):0 Protein: 0g  # Meals Ordered from Kitchen: 3 meals  # Meals Recorded: 3  First - 100% gluten free toast w/ peanut butter, sausage alvino, greek yogurt, 75% scrambled eggs w/ cheese, 25% cream of rice  Second - 100% fruit cup, joel, lettuce, and tomato sandwich w/ mayonnaise on gluten free bread, side caesar salad w/ caesar dressing  Third - 100% chicken breast w/ gravy, rice, green beans   # Supplements Recorded: 2 - 100% 2 [place] 1.0

## 2023-11-05 NOTE — PROGRESS NOTES
Colorectal Surgery Progress Note  [facility]    Subjective: Tolerating diet, ostomy output of 1.8 L. Subjectively doing well.   Vitals:  Vitals:    11/04/23 0700 11/04/23 1351 11/04/23 2151 11/05/23 0500   BP: 113/71 107/68 101/62 107/56   BP Location: Right arm Right arm Right arm Right arm   Patient Position:    Supine   Cuff Size:    Adult Regular   Pulse: 78 74 77 70   Resp: 16 16 18 13   Temp: 98.3  F (36.8  C) 97.8  F (36.6  C) 98.2  F (36.8  C) 98.5  F (36.9  C)   TempSrc: Oral Axillary Oral Oral   SpO2: 97% 98% 98%    Weight:         I/O:  I/O last 3 completed shifts:  In: 720 [P.O.:720]  Out: 2150 [Stool:2150]    Physical Exam:  Gen: AAOx3, NAD - looks better and brighter today.   Pulm: Non-labored breathing.  + cough continues this morning.   Abd: Soft, non-distended, mildly tender diffusely.     Stoma pink and viable with applesauce consistency of output  Ext:  Warm and well-perfused    BMP  Recent Labs   Lab 11/04/23  2225 11/04/23  1758 11/04/23  1304 11/04/23  0854 11/04/23  0635 11/03/23  0836 11/03/23  0607 11/02/23  0805 11/02/23  0659 11/01/23  0812 11/01/23  0701 10/30/23  2141 10/30/23  1436   NA  --   --   --   --  140  --  140  --  140  --  139   < > 131*   POTASSIUM  --   --   --   --  3.5  --  3.4  --  3.4  --  3.7   < > 3.5   CHLORIDE  --   --   --   --  106  --  106  --  103  --  104   < > 96*   CO2  --   --   --   --  26  --  24  --  26  --  24   < > 21*   BUN  --   --   --   --  6.5  --  6.0  --  3.5*  --  3.1*   < > 3.4*   CR  --   --   --   --  0.74  --  0.83  --  0.79  --  0.72   < > 0.83   * 132* 104* 90 82   < > 81   < > 95   < > 90   < > 132*   MAG  --   --   --   --   --   --   --   --  1.8  --  1.6*  --  1.6*    < > = values in this interval not displayed.     CBC  Recent Labs   Lab 11/04/23  0635 11/03/23  0607 11/02/23  0659 11/01/23  0701   WBC 6.8 5.7 6.2 5.5   HGB 9.5* 9.4* 10.6* 10.4*   HCT 30.3* 29.8* 33.7* 32.7*    295 278 236          ASSESSMENT: 48 year old female, PMH of UC (possibly 2/2 immunotherapy colitis from history of melanoma) s/p TAC w/ EI in 6/2021 with parastomal hernia, followed by robotic proctectomy, end ileostomy take down, formation of j-pouch and diverting loop ileostomy in 9/2023.  Pt has had a tough post-op course and has had high ileostomy outputs requiring outpatient IV fluids.  Now admitted on 10/30 with 3-4 days of fever, increased ileostomy outputs and continued J-pouch drainage.       COVID, RSV, Flu, Cdiff (ostomy specimen), enteric panel (ostomy specimen), legionella, strep pneumo negative.     - greatly appreciate Medicine and GI management  - strict in and outs of all ileostomy output, J-pouch output, urine  - Doubled fiber today  - Increased Imodium to 4mg TID.  Imodium needs to be given 30 minutes before food (orders placed)  - continue regular diet  - apprec WOCN  - apprec dietician recs.  Rec reg diet w/ supplements.  If not able to maintain adequate caloric intake, we would likely recommend feeding tube as next step  - is scheduled for ileostomy take down with Dr. Ram in Dec.   - agree with protonix BID  - discussed with medicine team    Dispo:  Possibly home tomorrow. [Should continue with outpatient IVF three times per week and weekly labs (CRS ordered weekly outpatient labs)  Have placed CRS discharge recommendations and CRS will coordinate clinic follow up in 2-3 weeks.]      Zafar Camarillo MD    Division of Colon & Rectal Surgery  Department of Surgery  [facility]  [phone]

## 2023-11-05 NOTE — PLAN OF CARE
Goal Outcome Evaluation:      Plan of Care Reviewed With: patient    Overall Patient Progress: improvingOverall Patient Progress: improving    Outcome Evaluation: Pt. AOx4 VSS on RA. Pt. complaining of nausea that is not relieved by zofran and a headache. PO diluadid given x2 which relieved headache.

## 2023-11-05 NOTE — PLAN OF CARE
Goal Outcome Evaluation:  /56 (BP Location: Right arm, Patient Position: Supine, Cuff Size: Adult Regular)   Pulse 70   Temp 98.5  F (36.9  C) (Oral)   Resp 13   Wt 89.9 kg (198 lb 4.8 oz)   SpO2 98%   BMI 36.27 kg/m      Care from: [id number]    A&OX4, able to make needs known. PRN dilaudid, tylenol and zofran given for c/o pain and nausea. PRN robitussin and tessalon given for cough. Voiding without difficulties. Ileostomy intact. Tolerating diet. No PIV access, team aware. Up independently in room. Call light within reach, continue with plan of care.

## 2023-11-05 NOTE — PROGRESS NOTES
[facility]    Internal Medicine Progress Note - [place] [id number] Service    Resident/Fellow Attestation   I, Ale Brasher MD, was present with the medical/ASHANTI student who participated in the service and in the documentation of the note.  I have verified the history and personally performed the physical exam and medical decision making.  I agree with the assessment and plan of care as documented in the note as edited by me.     Ale Brasher MD  PGY2  Date of Service (when I saw the patient): 11/05/23      Main Plans for Today   - Venous thromboses of R basilic and cephalic veins near IV access, on anticoagulation  - Removed access   - Pause fluids for the day  - Lymphedema consult  - Added lozenges for throat pain  - CR surgery doubled fiber and increased loperamide to 4mg TID  - Continue monitoring I/O and calories      Assessment & Plan   Doretha Yu is a 48 year old female admitted on 10/30/2023. 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 Pt s/p TAC with end ileostomy (laparoscopic) on 6/15/21 with subsequent parastomal hernia now s/p robotic protectectomy, ileostomy takedown, and creation of J-pouch and diverting loop ileostomy on 9/5/23. 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 Pt presents with fever x 3 days and increased ostomy output x 2 weeks, admitted at recommendation of GI. Pt states that she has had increased ostomy output for the last two weeks. Fever for the last 3 days. Pt states she has had very poor po intake for the last few days. No urinary sx, shortness of breath, cough. No recent travel. Lives with 15 y/o daughter. No recent abx.     Nivolumab induced UC  Increased ostomy output  Severe protein calorie malnutrition    H/o nivolumab-induced ulcerative colitis s/p TAC with end ileostomy (laparoscopic) on 6/15/21 with subsequent parastomal hernia now s/p robotic protectectomy, ileostomy takedown, and creation of J-pouch and diverting loop ileostomy on 9/5/23. Pt states since this time she has noticed increased ostomy output, but in the last two weeks much more so with correlating reduced PO intake. Has been seen in the ED on several occasions and has been getting OP IVF at an infusion center as output so significant. No blood in output. No recent abx.      FLUIDS              - Hold on 11/05     WORKUP              - Per GI as below                      - Blood cultures have not shown growth               - C. Diff: Negative              - UA moderate LE and few bacteria, UC mixed urogenital herlinda              - Amylase: WNL              - Urine legionella: Negative   - Enteric Bacteria and Virus: Negative   - Fecal calprotectin: WNL  - CT enterography: LLL consolidation, surgical changes of total colectomy and completion proctectomy w/R quadrant diverting ileostomy w/o evidence of infectious or inflammatory bowel pathology    ABX              - Zosyn 10/30-11/03     CONSULTS              - GI consulted, appreciate recs  - Infectious Stool Studies: C. Difficile Toxin B PCR with reflex to C. Difficile Antigen and Toxins A/B EIA, Enteric Bacteria and Virus Panel PCR  - Fecal Calprotectin  - Trend daily labs: CBC and CMP  - CT Enterography for further assessment of bowel, infection/abscess, or other intra-abdominal pathology  - IV Pantoprazole 40 mg BID  - Loperamide 4mg TID, give 30 min before meals  - Avoid artificial sugars   - Strict documentation of I/O  - Defer to CR surgery for post-operative loose stool management   - VTE prophylaxis   - Health psychology consult for coping strategies  - Psychiatry consult for mental health medical management  - Output goal < 1L/day  - Attempt regular gluten free diet, but if not maintaining  PO, next consideration would be [place]   - Ensure Max Protein Supplements   - Resume PTA fluids   - Follow up in 4 weeks with [facility]    - Colorectal surgery consulted, appreciate recs   - Agree with strict documentation of I/O  - Dietician consult: Education, medical food supplementation therapy   - WOCN for ostomy supplies: Patient given deep convex coloplast fecal pouches with silvia rings and comfeel to use as brava strips   - Recommends feeding tube next if she cannot maintain adequate caloric intake  - Added fiber 11/04, doubled 11/05      Pneumonia  CT enterography shows possible infiltrate LLL, well covered with zosyn, will add azithromycin for atypical coverage while undergoing workup. Urine Legionella and Strep as well as respiratory viral panel negative.   - Zosyn as above + doxycycline added 500 mg (10/30-11/03)     Hypomagnesemia  Likely 2/2 diarrhea.  -Resolved at 1.8 on 11/02 with RN magnesium protocol, will continue for now    Aphthous ulcers  Sores on upper lip, lower lip, and L side of tongue. Endorses these occur when she is sick. May be stress induced or another manifestation of her autoimmune illnesses.   - Consult dental, recs appreciated   - Biotene   - Lidocaine swish and spit   - Triamcinolone paste    Hyponatremia  Likely hypovolemic hyponatremia in setting of increased UOP. To be complete can get baseline studies. Urine Legionella negative. Osmolality decreased in blood 271 and urine 86.   - Resolved      Chronic Transaminitis  - Pending above workup  - Hepatitis panel negative   - Resolving with hydration     Depression  Anxiety  Patient endorses struggle coping with chemotherapy-induced autoimmune diseases that have led to GI surgeries and complications. Depression and anxiety are uncontrolled even on EFFEXOR (generic venlafaxine is refused). Has asked for help from psych when talking to GI.   - Currently PTA venlafaxine 150mg BID  - Consult psychology for coping strategies and support   -  Will follow 1x/week  - Consult psychiatry with help with meds, recs appreciated   - Add aripiprazole 2mg po qam      DM2  A1c 6.5% last 10/25. Takes metformin 500 BID as OP.   - Hold metformin  - LDSSI     Hypothyroidism  - Do not see PTA meds on list, can ask if she takes in AM     Normocytic Anemia  Baseline Hb 13, here with Hb of 9.5 on 11/04. Plts wnl.      STORMY  - CPAP     Chronic Pain  - APAP  - oxycodone 5 mg q 4  - Dilaudid 0.2-0.5 q 3 for short term only    Prothrombin Mutation  She had genetic testing done back when her first child was born and she had a CVA. It showed prothrombin mutation, not deficiency (deficiency may have been accidentally charted by others at some point). As a result, she was placed on prophylactic aspirin but was taken off when the UC symptoms began. This mutation makes her relatively more prothrombotic, will continue Lovenox IP as IBD makes you more thrombotic.   - Consider hematology as outpatient due to hypercoagulability with prothrombin mutation + IBD     Melanoma history:   - see H&P subjective section for full history, follows with [facility].     DISCHARGE PLANNING:     NEW MEDS:   -    MEDS TO STOP/HOLD:   - Hold hydroxyzine   - Hold methocarbamol     FOLLOWUP:   - Follow up in 4 weeks with [facility] GI  - Scheduled for upcoming Ileostomy closure on 12/27/2023 with Dr. Ram   - Consider hematology as outpatient due to hypercoagulability with prothrombin mutation + IBD    Diet: Snacks/Supplements Adult: Other; With all meals please send: 1 beneprotein powder, 1 Banatrol Plus, 1 greek yogurt; With Meals  Snacks/Supplements Adult: [employer] Standard 1.4 Oral Supplement; With Meals  Snacks/Supplements Adult: [employer] Standard Oral Supplement; With Meals  Gluten Free Diet  Calorie Counts  Fluids: 125cc/hr discontinued, now bolus  Lines: PIV  Major Catheter: Not present    DVT Prophylaxis: Enoxaparin 40mg every day subq  Code Status: Full Code       Seven Torres  Student (MS3)  [employer]    Pt staffed with Dr. Enoc Robledo MD who agrees with this plan.       Interval History   Doretha was interviewed in her room. Today is similar to yesterday, feeling badly again. Has significant nausea that is not improved by ondansetron. Usually ondansetron has worked in the past. She is allergic to compazine. States that her cough has lingered and she believes it is causing a headache. This is typically left-sided starting at the occiput and extending to the temporal region. It feels better when she massages it. No increase in abdominal pain or mouth ulcer pain the past few days. Has been hungry and doubled calorie count yesterday but feels she has to work hard at it. Reassured and noted that her hard work and patience is appreciated. Inquires about FMLA paperwork. No other concerns.     The team came back later and Doretha was largely unchanged. We came in while she was eating her breakfast. She continues to have a good appetite despite nausea. Informed that we added lozenges for throat pain and she has benzonatate + guaifenesin-dextromethorphan for cough. We do not want to add a nausea med right now because they often effect gut motility and we have been changing around meds related to this a lot lately. She is agreeable and interested in discharging tomorrow pending further instruction from CR surgery. Informed that her DVTs are superficial and she is already on anticoagulation so we will not change meds but will put in lymphedema referral. Spoke about FMLA. No other concerns.     Physical Exam   Vital Signs: Temp: 98.5  F (36.9  C) Temp src: Oral BP: 107/56 Pulse: 70   Resp: 13 SpO2: 98 % O2 Device: None (Room air)    Weight: 198 lbs 4.8 oz    General: Pt laying comfortably in bed, in no acute distress. Not requiring oxygen.   Cardio: Regular rate and rhythm. No murmurs, gallops, rubs.   Pulm: All lung fields clear without wheezes, crackles, or rhonchi.     Abd: Active bowel sounds. Tenderness to palpation in upper and lower quadrants, more severe along midline, minimal guarding or rebound.   HEENT: White ulcers on upper and lower lip ~1cm, 0.5cm bilateral tongue.  MSK: Erythematous presumed hang nail lesions on several fingers of both hands, no visible drainage. RUE noted to be more edematous than the L with a 3x3cm bruise near site of peripheral line. The edematous region is not erythematous. Has mild tenderness and warmth upon palpation.   Lower extremities: No lower extremity edema present bilaterally.   Neuro: Alert and oriented to person, place, and time.   Psych: Appropriate mood and affect.     Data   Recent Labs   Lab 11/04/23  2225 11/04/23  1758 11/04/23  1304 11/04/23  0854 11/04/23  0635 11/03/23  0836 11/03/23  0607 11/02/23  0805 11/02/23  0659 10/30/23  1437 10/30/23  1436   WBC  --   --   --   --  6.8  --  5.7  --  6.2   < >  --    HGB  --   --   --   --  9.5*  --  9.4*  --  10.6*   < >  --    MCV  --   --   --   --  86  --  85  --  86   < >  --    PLT  --   --   --   --  324  --  295  --  278   < >  --    INR  --   --   --   --   --   --   --   --   --   --  1.07   NA  --   --   --   --  140  --  140  --  140   < > 131*   POTASSIUM  --   --   --   --  3.5  --  3.4  --  3.4   < > 3.5   CHLORIDE  --   --   --   --  106  --  106  --  103   < > 96*   CO2  --   --   --   --  26  --  24  --  26   < > 21*   BUN  --   --   --   --  6.5  --  6.0  --  3.5*   < > 3.4*   CR  --   --   --   --  0.74  --  0.83  --  0.79   < > 0.83   ANIONGAP  --   --   --   --  8  --  10  --  11   < > 14   PATRICIA  --   --   --   --  8.8  --  8.7  --  9.1   < > 8.8   * 132* 104*   < > 82   < > 81   < > 95   < > 132*   ALBUMIN  --   --   --   --  3.0*  --  3.0*  --  3.4*   < > 3.8   PROTTOTAL  --   --   --   --  5.5*  --  5.6*  --  6.6   < > 7.1   BILITOTAL  --   --   --   --  <0.2  --  <0.2  --  <0.2   < > 0.2   ALKPHOS  --   --   --   --  155*  --  168*  --  193*   < > 258*    ALT  --   --   --   --  41  --  50  --  68*   < > 146*   AST  --   --   --   --  23  --  27  --  29   < > 92*    < > = values in this interval not displayed.       Imaging results reviewed over the past 24 hrs:   Recent Results (from the past 24 hour(s))   US Upper Extremity Venous Duplex Right    Narrative    EXAMINATION: DOPPLER VENOUS ULTRASOUND OF THE RIGHT UPPER EXTREMITY,  11/4/2023 4:30 PM     COMPARISON: None    HISTORY: Pain and erythema in the medial right upper extremity    TECHNIQUE:  Gray-scale evaluation with compression, spectral flow and  color Doppler assessment of the deep venous system of the right upper  extremity.    FINDINGS:  Right: Normal blood flow and waveforms are demonstrated in the  internal jugular, innominate, subclavian, and axillary veins. The  right basilic vein in the axilla is fully compressible, however, from  the antecubital fossa to the mid upper arm the basilic vein is  noncompressible with echogenic luminal material. The right cephalic  vein from the mid forearm to the antecubital fossa also displays  noncompressibility.      Impression    IMPRESSION:  1.  No evidence of right upper extremity deep venous thrombosis.  2.  Superficial venous thromboses involving the right basilic vein and  right cephalic veins from the mid forearm to the antecubital fossa.  Both veins are patent at the axilla.    I have personally reviewed the examination and initial interpretation  and I agree with the findings.    AYAD QUIÑONEZ MD         SYSTEM ID:  [id number]

## 2023-11-06 ENCOUNTER — APPOINTMENT (OUTPATIENT)
Dept: OCCUPATIONAL THERAPY | Facility: CLINIC | Age: 48
DRG: 385 | End: 2023-11-06
Payer: COMMERCIAL

## 2023-11-06 LAB
ANION GAP SERPL CALCULATED.3IONS-SCNC: 13 MMOL/L (ref 7–15)
BUN SERPL-MCNC: 6.5 MG/DL (ref 6–20)
C PNEUM DNA SPEC QL NAA+PROBE: NOT DETECTED
CALCIUM SERPL-MCNC: 9.1 MG/DL (ref 8.6–10)
CHLORIDE SERPL-SCNC: 104 MMOL/L (ref 98–107)
CREAT SERPL-MCNC: 0.67 MG/DL (ref 0.51–0.95)
DEPRECATED HCO3 PLAS-SCNC: 23 MMOL/L (ref 22–29)
EGFRCR SERPLBLD CKD-EPI 2021: >90 ML/MIN/1.73M2
ERYTHROCYTE [DISTWIDTH] IN BLOOD BY AUTOMATED COUNT: 16.5 % (ref 10–15)
FLUAV H1 2009 PAND RNA SPEC QL NAA+PROBE: NOT DETECTED
FLUAV H1 RNA SPEC QL NAA+PROBE: NOT DETECTED
FLUAV H3 RNA SPEC QL NAA+PROBE: NOT DETECTED
FLUAV RNA SPEC QL NAA+PROBE: NOT DETECTED
FLUBV RNA SPEC QL NAA+PROBE: NOT DETECTED
GLUCOSE BLDC GLUCOMTR-MCNC: 123 MG/DL (ref 70–99)
GLUCOSE BLDC GLUCOMTR-MCNC: 124 MG/DL (ref 70–99)
GLUCOSE BLDC GLUCOMTR-MCNC: 140 MG/DL (ref 70–99)
GLUCOSE BLDC GLUCOMTR-MCNC: 161 MG/DL (ref 70–99)
GLUCOSE BLDC GLUCOMTR-MCNC: 97 MG/DL (ref 70–99)
GLUCOSE SERPL-MCNC: 90 MG/DL (ref 70–99)
GROUP A STREP BY PCR: NOT DETECTED
HADV DNA SPEC QL NAA+PROBE: NOT DETECTED
HCOV PNL SPEC NAA+PROBE: NOT DETECTED
HCT VFR BLD AUTO: 30.4 % (ref 35–47)
HGB BLD-MCNC: 9.7 G/DL (ref 11.7–15.7)
HMPV RNA SPEC QL NAA+PROBE: NOT DETECTED
HPIV1 RNA SPEC QL NAA+PROBE: NOT DETECTED
HPIV2 RNA SPEC QL NAA+PROBE: NOT DETECTED
HPIV3 RNA SPEC QL NAA+PROBE: NOT DETECTED
HPIV4 RNA SPEC QL NAA+PROBE: NOT DETECTED
M PNEUMO DNA SPEC QL NAA+PROBE: NOT DETECTED
MAGNESIUM SERPL-MCNC: 1.6 MG/DL (ref 1.7–2.3)
MCH RBC QN AUTO: 27.5 PG (ref 26.5–33)
MCHC RBC AUTO-ENTMCNC: 31.9 G/DL (ref 31.5–36.5)
MCV RBC AUTO: 86 FL (ref 78–100)
PLATELET # BLD AUTO: 419 10E3/UL (ref 150–450)
POTASSIUM SERPL-SCNC: 3.9 MMOL/L (ref 3.4–5.3)
RBC # BLD AUTO: 3.53 10E6/UL (ref 3.8–5.2)
RSV RNA SPEC QL NAA+PROBE: NOT DETECTED
RSV RNA SPEC QL NAA+PROBE: NOT DETECTED
RV+EV RNA SPEC QL NAA+PROBE: NOT DETECTED
SODIUM SERPL-SCNC: 140 MMOL/L (ref 135–145)
WBC # BLD AUTO: 13.2 10E3/UL (ref 4–11)

## 2023-11-06 PROCEDURE — 99232 SBSQ HOSP IP/OBS MODERATE 35: CPT | Performed by: STUDENT IN AN ORGANIZED HEALTH CARE EDUCATION/TRAINING PROGRAM

## 2023-11-06 PROCEDURE — 250N000011 HC RX IP 250 OP 636

## 2023-11-06 PROCEDURE — 97140 MANUAL THERAPY 1/> REGIONS: CPT | Mod: GO

## 2023-11-06 PROCEDURE — 87581 M.PNEUMON DNA AMP PROBE: CPT

## 2023-11-06 PROCEDURE — 36415 COLL VENOUS BLD VENIPUNCTURE: CPT

## 2023-11-06 PROCEDURE — 250N000013 HC RX MED GY IP 250 OP 250 PS 637

## 2023-11-06 PROCEDURE — 250N000013 HC RX MED GY IP 250 OP 250 PS 637: Performed by: SURGERY

## 2023-11-06 PROCEDURE — 120N000002 HC R&B MED SURG/OB UMMC

## 2023-11-06 PROCEDURE — 258N000003 HC RX IP 258 OP 636

## 2023-11-06 PROCEDURE — 82310 ASSAY OF CALCIUM: CPT | Performed by: STUDENT IN AN ORGANIZED HEALTH CARE EDUCATION/TRAINING PROGRAM

## 2023-11-06 PROCEDURE — 87651 STREP A DNA AMP PROBE: CPT

## 2023-11-06 PROCEDURE — 83735 ASSAY OF MAGNESIUM: CPT

## 2023-11-06 PROCEDURE — 250N000013 HC RX MED GY IP 250 OP 250 PS 637: Performed by: STUDENT IN AN ORGANIZED HEALTH CARE EDUCATION/TRAINING PROGRAM

## 2023-11-06 PROCEDURE — 250N000011 HC RX IP 250 OP 636: Mod: JZ | Performed by: STUDENT IN AN ORGANIZED HEALTH CARE EDUCATION/TRAINING PROGRAM

## 2023-11-06 PROCEDURE — 97165 OT EVAL LOW COMPLEX 30 MIN: CPT | Mod: GO

## 2023-11-06 PROCEDURE — 999N000248 HC STATISTIC IV INSERT WITH US BY RN

## 2023-11-06 PROCEDURE — 85027 COMPLETE CBC AUTOMATED: CPT

## 2023-11-06 RX ORDER — LOPERAMIDE HCL 2 MG
4 CAPSULE ORAL
Status: DISCONTINUED | OUTPATIENT
Start: 2023-11-06 | End: 2023-11-08 | Stop reason: HOSPADM

## 2023-11-06 RX ORDER — LOPERAMIDE HCL 2 MG
4 CAPSULE ORAL 4 TIMES DAILY
Status: DISCONTINUED | OUTPATIENT
Start: 2023-11-06 | End: 2023-11-06

## 2023-11-06 RX ORDER — MAGNESIUM OXIDE 400 MG/1
400 TABLET ORAL EVERY 4 HOURS
Status: COMPLETED | OUTPATIENT
Start: 2023-11-06 | End: 2023-11-06

## 2023-11-06 RX ADMIN — ENOXAPARIN SODIUM 40 MG: 40 INJECTION SUBCUTANEOUS at 13:56

## 2023-11-06 RX ADMIN — BENZONATATE 100 MG: 100 CAPSULE ORAL at 12:06

## 2023-11-06 RX ADMIN — LOPERAMIDE HYDROCHLORIDE 4 MG: 2 CAPSULE ORAL at 07:54

## 2023-11-06 RX ADMIN — ONDANSETRON HYDROCHLORIDE 4 MG: 4 TABLET, FILM COATED ORAL at 19:47

## 2023-11-06 RX ADMIN — PSYLLIUM HUSK 1 PACKET: 3.4 POWDER ORAL at 19:47

## 2023-11-06 RX ADMIN — MAGNESIUM OXIDE TAB 400 MG (241.3 MG ELEMENTAL MG) 400 MG: 400 (241.3 MG) TAB at 07:59

## 2023-11-06 RX ADMIN — PSYLLIUM HUSK 1 PACKET: 3.4 POWDER ORAL at 13:56

## 2023-11-06 RX ADMIN — HYDROMORPHONE HYDROCHLORIDE 1 MG: 2 TABLET ORAL at 12:19

## 2023-11-06 RX ADMIN — ARIPIPRAZOLE 2 MG: 2 TABLET ORAL at 07:54

## 2023-11-06 RX ADMIN — Medication 1 LOZENGE: at 20:21

## 2023-11-06 RX ADMIN — LOPERAMIDE HYDROCHLORIDE 4 MG: 2 CAPSULE ORAL at 21:53

## 2023-11-06 RX ADMIN — LOPERAMIDE HYDROCHLORIDE 4 MG: 2 CAPSULE ORAL at 15:58

## 2023-11-06 RX ADMIN — SODIUM CHLORIDE, POTASSIUM CHLORIDE, SODIUM LACTATE AND CALCIUM CHLORIDE 1000 ML: 600; 310; 30; 20 INJECTION, SOLUTION INTRAVENOUS at 12:06

## 2023-11-06 RX ADMIN — PSYLLIUM HUSK 1 PACKET: 3.4 POWDER ORAL at 08:01

## 2023-11-06 RX ADMIN — HYDROMORPHONE HYDROCHLORIDE 1 MG: 2 TABLET ORAL at 15:58

## 2023-11-06 RX ADMIN — ACETAMINOPHEN 650 MG: 325 TABLET, FILM COATED ORAL at 12:18

## 2023-11-06 RX ADMIN — VENLAFAXINE 150 MG: 75 TABLET ORAL at 07:54

## 2023-11-06 RX ADMIN — GUAIFENESIN AND DEXTROMETHORPHAN 10 ML: 100; 10 SYRUP ORAL at 05:15

## 2023-11-06 RX ADMIN — MAGNESIUM OXIDE TAB 400 MG (241.3 MG ELEMENTAL MG) 400 MG: 400 (241.3 MG) TAB at 11:14

## 2023-11-06 RX ADMIN — ACETAMINOPHEN 650 MG: 325 TABLET, FILM COATED ORAL at 05:15

## 2023-11-06 RX ADMIN — VENLAFAXINE 150 MG: 75 TABLET ORAL at 19:46

## 2023-11-06 RX ADMIN — GUAIFENESIN AND DEXTROMETHORPHAN 10 ML: 100; 10 SYRUP ORAL at 15:58

## 2023-11-06 RX ADMIN — HYDROMORPHONE HYDROCHLORIDE 1 MG: 2 TABLET ORAL at 19:46

## 2023-11-06 RX ADMIN — INSULIN ASPART 1 UNITS: 100 INJECTION, SOLUTION INTRAVENOUS; SUBCUTANEOUS at 17:25

## 2023-11-06 RX ADMIN — GUAIFENESIN AND DEXTROMETHORPHAN 10 ML: 100; 10 SYRUP ORAL at 20:21

## 2023-11-06 RX ADMIN — PANTOPRAZOLE SODIUM 40 MG: 40 TABLET, DELAYED RELEASE ORAL at 07:55

## 2023-11-06 RX ADMIN — PANTOPRAZOLE SODIUM 40 MG: 40 TABLET, DELAYED RELEASE ORAL at 15:58

## 2023-11-06 RX ADMIN — HYDROMORPHONE HYDROCHLORIDE 1 MG: 2 TABLET ORAL at 05:15

## 2023-11-06 RX ADMIN — LOPERAMIDE HYDROCHLORIDE 4 MG: 2 CAPSULE ORAL at 11:14

## 2023-11-06 RX ADMIN — ACETAMINOPHEN 650 MG: 325 TABLET, FILM COATED ORAL at 19:46

## 2023-11-06 RX ADMIN — BENZONATATE 100 MG: 100 CAPSULE ORAL at 07:55

## 2023-11-06 ASSESSMENT — ACTIVITIES OF DAILY LIVING (ADL)
ADLS_ACUITY_SCORE: 20

## 2023-11-06 NOTE — PROGRESS NOTES
Colorectal Surgery Progress Note  [facility]    Subjective: Tolerating regular diet, ostomy output of 1.5L yesterday (7am-7am). Subjectively doing well, abdominal pain is better.    Vitals:  Vitals:    11/05/23 0500 11/05/23 2100 11/05/23 2330 11/06/23 0500   BP: 107/56 103/67 120/75 120/70   BP Location: Right arm Right arm Right arm Right arm   Patient Position: Supine      Cuff Size: Adult Regular  Adult Regular    Pulse: 70 74 88 82   Resp: 13 16 16 16   Temp: 98.5  F (36.9  C) 98.9  F (37.2  C) 99  F (37.2  C) 99  F (37.2  C)   TempSrc: Oral Oral Oral Oral   SpO2:  94% 95% 93%   Weight:         I/O:  I/O last 3 completed shifts:  In: 240 [P.O.:240]  Out: 1300 [Stool:1300]    Physical Exam:  Gen: AAOx3, NAD  Pulm: Non-labored breathing.  + cough continues this morning.   Abd: Soft, non-distended, minimally tender diffusely.     Stoma pink and viable with mixed watery/applesauce consistency of output  Ext:  Warm and well-perfused    BMP  Recent Labs   Lab 11/06/23  0757 11/06/23  0634 11/05/23  2107 11/05/23  1631 11/05/23  1202 11/05/23  0820 11/05/23  0724 11/04/23  0854 11/04/23  0635 11/03/23  0836 11/03/23  0607 11/02/23  0805 11/02/23  0659 11/01/23  0812 11/01/23  0701   NA  --   --   --   --   --   --  143  --  140  --  140  --  140  --  139   POTASSIUM  --   --   --   --   --   --  3.6  --  3.5  --  3.4  --  3.4  --  3.7   CHLORIDE  --   --   --   --   --   --  107  --  106  --  106  --  103  --  104   CO2  --   --   --   --   --   --  26  --  26  --  24  --  26  --  24   BUN  --   --   --   --   --   --  6.9  --  6.5  --  6.0  --  3.5*  --  3.1*   CR  --   --   --   --   --   --  0.66  --  0.74  --  0.83  --  0.79  --  0.72   GLC 97  --  152* 172* 121*   < > 84   < > 82   < > 81   < > 95   < > 90   MAG  --  1.6*  --   --   --   --  1.6*  --   --   --   --   --  1.8  --  1.6*   PHOS  --   --   --   --   --   --  4.2  --   --   --   --   --   --   --   --     < > = values in  this interval not displayed.     CBC  Recent Labs   Lab 11/06/23  0634 11/04/23  0635 11/03/23  0607 11/02/23  0659   WBC 13.2* 6.8 5.7 6.2   HGB 9.7* 9.5* 9.4* 10.6*   HCT 30.4* 30.3* 29.8* 33.7*    324 295 278         ASSESSMENT: 48 year old female, PMH of UC (possibly 2/2 immunotherapy colitis from history of melanoma) s/p TAC w/ EI in 6/2021 with parastomal hernia, followed by robotic proctectomy, end ileostomy take down, formation of j-pouch and diverting loop ileostomy in 9/2023.  Pt has had a tough post-op course and has had high ileostomy outputs requiring outpatient IV fluids.  Now admitted on 10/30 with 3-4 days of fever, increased ileostomy outputs and continued J-pouch drainage.       COVID, RSV, Flu, Cdiff (ostomy specimen), enteric panel (ostomy specimen), legionella, strep pneumo negative.     - greatly appreciate Medicine and GI management  - strict in and outs of all ileostomy output, J-pouch output, urine  - increase psyllium to TID (order placed)  - Increase Imodium to 4mg QID (order placed).  Imodium needs to be given 30 minutes before food (orders placed)  - continue regular diet  - apprec WOCN  - apprec dietician recs.  Rec reg diet w/ supplements.  If not able to maintain adequate caloric intake, we would likely recommend feeding tube as next step  - is scheduled for ileostomy take down with Dr. Ram in Dec.   - agree with protonix BID  - discussed with medicine team    Dispo:  Possibly home tomorrow if ileostomy output less than 1.5L. If that is the case, she can be discharged without the 3x weekly IV fluid infusions she was getting previously. Continue weekly labs (CRS ordered weekly outpatient labs)  Have placed CRS discharge recommendations and CRS will coordinate clinic follow up in 2-3 weeks.    Pt d/w attending Dr. Ram.    Gasper Varela MD, MS  Fellow, Colon & Rectal Surgery  [facility]  11/06/2023  10:01 AM

## 2023-11-06 NOTE — PROGRESS NOTES
Calorie Count  Intake recorded for: 11/5  Total Kcals: 888 Total Protein: 25g  Kcals from Hospital Food: 888   Protein: 25g  Kcals from Outside Food (average):0 Protein: 0g  # Meals Ordered from Kitchen: 3 meals  # Meals Recorded: 1 meal ( 100% 1 sausage alvino, cream of rice w/ brown sugar, banana w/ peanut butter, 1 4oz cranberry juice, 25% scrambled eggs)  # Supplements Recorded: 0

## 2023-11-06 NOTE — PLAN OF CARE
Goal Outcome Evaluation:  /75 (BP Location: Right arm, Cuff Size: Adult Regular)   Pulse 88   Temp 99  F (37.2  C) (Oral)   Resp 16   Wt 89.9 kg (198 lb 4.8 oz)   SpO2 95%   BMI 36.27 kg/m       Neuro: A&Ox4.   Cardiac: No tele, VSS.   Respiratory: Sating> 95% on RA.  GI/: Adequate urine output. Ileostomy intact.  Diet/appetite: gluten free diet, calorie count.  Activity:  Independent in room  Pain: C/o headache, minor relief with PRN meds.   Skin: No new deficits noted.   LDA's: None    Plan: Continue with POC. Notify primary team with changes.

## 2023-11-06 NOTE — DISCHARGE SUMMARY
"[facility]  Discharge Summary - Medicine & Pediatrics       Date of Admission:  10/30/2023  Date of Discharge:  11/8/2023  3:57 PM  Discharging Provider: Dr. Easton  Discharge Service: Medicine Service, JASMIN TEAM 2    Discharge Diagnoses   Nivolumab induced UC  Increased ostomy output  Severe protein calorie malnutrition   Pneumonia   Hypomagnesemia   Aphthous ulcers   Hyponatremia   Chronic Transaminitis   Depression  Anxiety  DM2   Hypothyroidism   Normocytic Anemia   STORMY   Chronic Pain   Prothrombin Mutation   Melanoma history     Clinically Significant Risk Factors     # DMII: A1C = 6.5 % (Ref range: 0.0 - 5.6 %) within past 6 months  # Obesity: Estimated body mass index is 36.27 kg/m  as calculated from the following:    Height as of 10/26/23: 1.575 m (5' 2\").    Weight as of this encounter: 89.9 kg (198 lb 4.8 oz).  # Severe Malnutrition: based on nutrition assessment      Follow-ups Needed After Discharge   Follow-up Appointments     Adult [facility] Follow-up and recommended labs and tests      COLON AND RECTAL SURGERY DISCHARGE INSTRUCTIONS:     DIET  -Regular Diet as tolerated  -Stay well hydrated.  But we do recommend eating/snacking on some solid   with the liquids that you drink.  Pick snacks with salt and potassium   (potato chips, gluten free pretzels w/ peanut butter, bananas, yogurt,   etc)  -Recommend 2-3 protein supplements per day, pick the supplements with the   lowest amount of sugar as sugar can cause higher ileostomy outputs.      ACTIVITY  -No lifting, pushing, pulling greater than 10 lbs and no strenuous   exercise for 6 weeks from the date of surgery.   -No driving while on narcotic analgesics (i.e. Percocet, oxycodone,   Vicodin)  -No driving until you are able to fully twist to both sides or slam on   brakes quickly and without any pain  -We encourage walking at least 4-5 times per day    WOUND CARE  -Inspect your wounds daily for signs of " infection (increased redness,   drainage, pain)  -Keep your wound clean and dry  -You may shower, but do not soak in tub or pool    NOTIFY  Please contact Annamarie Espinal RN or Charis Adkins RN at [phone] for   problems after discharge such as:  -Temperature > 101F, chills, rigors, dizziness  -Redness around or purulent drainage from wound  -Inability to tolerate diet, nausea or vomiting  -You stop passing gas (or your stoma stops functioning), develop   significant bloating, abdominal pain  -You have dark and low volume urine  -Have blood in stools/vomit  -Have severe diarrhea/constipation  -Any other questions or concerns.  - At nights (after 4:30pm), on weekends, or if urgent, call [phone]   and ask the  to speak with the on-call Colorectal Surgery resident   or fellow    -Measure your total daily ileostomy output and record.  If you have LESS   than 500mL or GREATER than 1000mL of ileostomy output within a 24 hour   period, please notify the Colorectal Nurse.  If you have greater than   1000-1200mL in a 24 hour period or greater than 500cc for three   consecutive 8 hour shifts, take Imodium 2mg once, stay hydrated especially   with Pedialyte and call the Colorectal Clinic to further discuss.      Medication Instructions  Some of your medications may have changed. Please take only prescribed and   resumed medications     FOLLOW-UP  1.  You will need to follow-up with Jena Null NP or   Mariela Crandall PA-C in the Colon and Rectal Surgery clinic in 2-3 week(s) and   then with CRS Staff: Dr. Quinton Ram as scheduled on 12/12/2023.    Please contact our Nurses (phone # [phone]) if you have not heard   from our clinic in 3 business days afer discharge to schedule a follow-up   appointment.  Please bring your log of daily ileostomy outputs to your   follow up clinic appointment.   2.  We recommend weekly labs to assess your fluid status.  Labs:  BMP and   Mag once weekly for the  next 3-4 weeks.  Lab results should be faxed to   Dr. Ram at Fax: [phone].  3.  We recommend that you continue the outpatient IV fluid boluses three   times per week until seen again in the Colon and Rectal Surgery clinic.    4. Please reach out if you are having liquid ileostomy outputs so we can   help you titrate your bowel slowing medications to help prevent severe   dehydration.    5.  Please be very cognizant of your oral intake as any malnourishment can   delay your surgery in December.  We recommend that you take protein   supplements 2-3 per day starting now and up until your surgery due to the   recent weight loss.           ORAL REHYDRATION RECIPE for Home Use  With your new ileostomy, you are at increased risk for dehydration,    Especially if you have high ileostomy outputs, or low appetites, you can   help prevent it by drinking this mixture as directed.  Some examples of commercially prepared Oral Rehydration Solutions are:   Pedialyte (solution, powder packet, Freeze Pops), or Drip Drop.     Electrolyte Water   -4 cups of water (equal to 1 quart or 32 ounces)   -  teaspoon salt   -6 teaspoons sugar   -Crystal Light if desired (or other choice Nutrasweet or Splenda   flavoring - SUGAR-FREE) to taste (recommend lemonade or orange-pineapple   flavors, but any flavor will work)    Add two cups of tap water to a large container. With measuring spoons (not   table silverware), add the dry ingredients and stir or shake well. Add two   additional cups of water. This will equal one quart of Electrolyte Water.   Add sugar-free flavoring if desired. Best if chilled. Sip solution   throughout the day. Discard after 24 hours.            Appointments on [place] and/or [facility] (with [facility] or [facility]   provider or service). Call [phone] if you haven't heard regarding   these appointments within 7 days of discharge.              Discharge Disposition   Discharged to home  Condition at  discharge: Stable    Hospital Course   Doretha Yu is a 48 year old female admitted on 10/30/2023. 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 Pt s/p TAC with end ileostomy (laparoscopic) on 6/15/21 with subsequent parastomal hernia now s/p robotic protectectomy, ileostomy takedown, and creation of J-pouch and diverting loop ileostomy on 9/5/23. 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 Pt presented with fever x 3 days and increased ostomy output x 2 weeks, admitted at recommendation of GI. Colorectal was consulted and helped to reduce output with slow addition of fiber supplementation and laxatives. Additionally found to have pna incidentally on imaging, treated with 5 days of abx and feeling improved.  At time of discharge output had improved from ostomy and plan for outpatient IV fluid infusions at infusion center due to poor peripheral IV access options.  We will follow-up with colorectal in 1 week.     Nivolumab induced UC  Increased ostomy output  Severe protein calorie malnutrition   H/o nivolumab-induced ulcerative colitis s/p TAC with end ileostomy (laparoscopic) on 6/15/21 with subsequent parastomal hernia now s/p robotic protectectomy, ileostomy takedown, and creation of J-pouch and diverting loop ileostomy on 9/5/23. Pt had noticed increased ostomy output, but in the last two weeks prior to admission, much more so with correlating reduced PO intake. Had been seen in the ED on several occasions and has been getting OP IVF at an infusion center as output so significant. No blood in output. No recent abx. Negative for infectious causes of diarrhea. CT enterography without fistula of inflammatory pathology. Calorie counts demonstrating adequate PO, next step if needed would be  feeding tube.   - PO Pantoprazole 40 mg BID  - Loperamide 2 capsules 4 times a day  -Psyllium 1 packet 3 times a day  -Lomotil 1 tablet 2 times daily  - Avoid artificial sugars   - Health psychology consult for coping strategies  - Attempt regular gluten free diet, but if not maintaining PO, next consideration would be [place]   - Ensure Max Protein Supplements  - Resume PTA fluids at OP infusion center  - Follow up in 4 weeks with [facility] GI    - WOCN for ostomy supplies: Patient given deep convex coloplast fecal pouches with silvia rings and comfeel to use as brava strips     Pneumonia- resolved  CT enterography shows possible infiltrate LLL, well covered with zosyn, will add azithromycin for atypical coverage while undergoing workup. Urine Legionella and Strep as well as respiratory viral panel negative.   - Zosyn + doxycycline (10/30-11/03)     Hypomagnesemia  Likely 2/2 diarrhea.     Aphthous ulcers  Sores on upper lip, lower lip, and L side of tongue. Endorses these occur when she is sick. May be stress induced or another manifestation of her autoimmune illnesses.   - Consult dental, recs appreciated              - Biotene   - Lidocaine swish and spit   - Triamcinolone paste     Depression  Anxiety  Patient endorses struggle coping with chemotherapy-induced autoimmune diseases that have led to GI surgeries and complications. Depression and anxiety are uncontrolled even on EFFEXOR (generic venlafaxine is refused). Has asked for help from psych when talking to GI.   - PTA venlafaxine 150mg BID  -New Abilify 2 mg daily   -Outpatient psychiatry follow-up     DM2  A1c 6.5% last 10/25. Takes metformin 500 BID as OP.   - Resume PTA metformin  -Was on phentermine for weight loss this was discontinued at discharge     STORMY  - CPAP     Chronic Pain  - APAP  -PTA oxycodone 5 mg      Prothrombin Mutation  She had genetic testing done back when her first child was born and she had a CVA. It showed prothrombin mutation, not  deficiency (deficiency may have been accidentally charted by others at some point). As a result, she was placed on prophylactic aspirin but was taken off when the UC symptoms began. This mutation makes her relatively more prothrombotic, will continue Lovenox IP as IBD makes you more thrombotic.   - Hematology followup as outpatient due to hypercoagulability with prothrombin mutation + IBD  -Avoid placement of PICC lines if able     Melanoma history:   - see H&P subjective section for full history, follows with [facility].    Consultations This Hospital Stay   GI LUMINAL ADULT IP CONSULT  COLORECTAL SURGERY ADULT IP CONSULT  PSYCHOLOGY ADULT IP CONSULT  PSYCHIATRY IP CONSULT  DENTAL HYGIENE IP ADULT CONSULT - UU  PHYSICAL THERAPY ADULT IP CONSULT  NURSING TO CONSULT FOR VASCULAR ACCESS CARE IP CONSULT  WOUND OSTOMY CONTINENCE NURSE  IP CONSULT  NUTRITION SERVICES ADULT IP CONSULT  CARE MANAGEMENT / SOCIAL WORK IP CONSULT  NURSING TO CONSULT FOR VASCULAR ACCESS CARE IP CONSULT  NURSING TO CONSULT FOR VASCULAR ACCESS CARE IP CONSULT  NURSING TO CONSULT FOR VASCULAR ACCESS CARE IP CONSULT  LYMPHEDEMA THERAPY IP CONSULT    Code Status   Full Code       The patient was discussed with Dr. Rustam Brasher MD  [facility] 7C MED SURG  [address] [address]  Phone: [phone]  ______________________________________________________________________    Physical Exam   Vital Signs: Temp: 98.5  F (36.9  C) Temp src: Oral BP: 107/56 Pulse: 70   Resp: 13 SpO2: 98 % O2 Device: None (Room air)    Weight: 198 lbs 4.8 oz  General: Pt laying comfortably in bed, in no acute distress. Not requiring oxygen.   Cardio: Regular rate and rhythm. No murmurs, gallops, rubs.   Pulm: All lung fields clear without wheezes, crackles, or rhonchi.    Abd: Active bowel sounds. Tenderness to palpation in upper and lower quadrants, more severe along midline, minimal guarding or rebound.   Lower  extremities: No lower extremity edema present bilaterally.   Neuro: Alert and oriented to person, place, and time.   Psych: Appropriate mood and affect.       Primary Care Physician   Anna Naidu    Discharge Orders      Basic metabolic panel    Weekly labs - BMP and Mg for the next 3-4 weeks.  Results should be faxed to Colon and Rectal Surgery, Dr. Ram at fax: [phone].     Magnesium    Weekly labs - BMP and Mg for the next 3-4 weeks.  Results should be faxed to Colon and Rectal Surgery, Dr. Ram at fax: [phone].     Home Infusion Referral      Adult [facility] Follow-up and recommended labs and tests    COLON AND RECTAL SURGERY DISCHARGE INSTRUCTIONS:     DIET  -Regular Diet as tolerated  -Stay well hydrated.  But we do recommend eating/snacking on some solid with the liquids that you drink.  Pick snacks with salt and potassium (potato chips, gluten free pretzels w/ peanut butter, bananas, yogurt, etc)  -Recommend 2-3 protein supplements per day, pick the supplements with the lowest amount of sugar as sugar can cause higher ileostomy outputs.      ACTIVITY  -No lifting, pushing, pulling greater than 10 lbs and no strenuous exercise for 6 weeks from the date of surgery.   -No driving while on narcotic analgesics (i.e. Percocet, oxycodone, Vicodin)  -No driving until you are able to fully twist to both sides or slam on brakes quickly and without any pain  -We encourage walking at least 4-5 times per day    WOUND CARE  -Inspect your wounds daily for signs of infection (increased redness, drainage, pain)  -Keep your wound clean and dry  -You may shower, but do not soak in tub or pool    NOTIFY  Please contact Annamarie Espinal RN or Charis Adkins RN at [phone] for problems after discharge such as:  -Temperature > 101F, chills, rigors, dizziness  -Redness around or purulent drainage from wound  -Inability to tolerate diet, nausea or vomiting  -You stop passing gas (or your stoma stops  functioning), develop significant bloating, abdominal pain  -You have dark and low volume urine  -Have blood in stools/vomit  -Have severe diarrhea/constipation  -Any other questions or concerns.  - At nights (after 4:30pm), on weekends, or if urgent, call [phone] and ask the  to speak with the on-call Colorectal Surgery resident or fellow    -Measure your total daily ileostomy output and record.  If you have LESS than 500mL or GREATER than 1000mL of ileostomy output within a 24 hour period, please notify the Colorectal Nurse.  If you have greater than 1000-1200mL in a 24 hour period or greater than 500cc for three consecutive 8 hour shifts, take Imodium 2mg once, stay hydrated especially with Pedialyte and call the Colorectal Clinic to further discuss.      Medication Instructions  Some of your medications may have changed. Please take only prescribed and resumed medications     FOLLOW-UP  1.  You will need to follow-up with Jena Null NP or Mariela Crandall PA-C in the Colon and Rectal Surgery clinic in 2-3 week(s) and then with CRS Staff: Dr. Quinton Ram as scheduled on 12/12/2023.  Please contact our Nurses (phone # [phone]) if you have not heard from our clinic in 3 business days afer discharge to schedule a follow-up appointment.  Please bring your log of daily ileostomy outputs to your follow up clinic appointment.   2.  We recommend weekly labs to assess your fluid status.  Labs:  BMP and Mag once weekly for the next 3-4 weeks.  Lab results should be faxed to Dr. Ram at Fax: [phone].  3.  We recommend that you continue the outpatient IV fluid boluses three times per week until seen again in the Colon and Rectal Surgery clinic.    4. Please reach out if you are having liquid ileostomy outputs so we can help you titrate your bowel slowing medications to help prevent severe dehydration.    5.  Please be very cognizant of your oral intake as any malnourishment can  delay your surgery in December.  We recommend that you take protein supplements 2-3 per day starting now and up until your surgery due to the recent weight loss.           ORAL REHYDRATION RECIPE for Home Use  With your new ileostomy, you are at increased risk for dehydration,  Especially if you have high ileostomy outputs, or low appetites, you can help prevent it by drinking this mixture as directed.  Some examples of commercially prepared Oral Rehydration Solutions are: Pedialyte (solution, powder packet, Freeze Pops), or Drip Drop.     Electrolyte Water   -4 cups of water (equal to 1 quart or 32 ounces)   -  teaspoon salt   -6 teaspoons sugar   -Crystal Light if desired (or other choice Nutrasweet or Splenda flavoring - SUGAR-FREE) to taste (recommend lemonade or orange-pineapple flavors, but any flavor will work)    Add two cups of tap water to a large container. With measuring spoons (not table silverware), add the dry ingredients and stir or shake well. Add two additional cups of water. This will equal one quart of Electrolyte Water. Add sugar-free flavoring if desired. Best if chilled. Sip solution throughout the day. Discard after 24 hours.            Appointments on [place] and/or [facility] (with [facility] or [facility] provider or service). Call [phone] if you haven't heard regarding these appointments within 7 days of discharge.       Significant Results and Procedures   Results for orders placed or performed during the hospital encounter of 10/30/23   CT Enterography with Contrast    Narrative    EXAMINATION: CT ENTEROGRAPHY WITH CONTRAST, 10/30/2023 6:06 PM    INDICATION: -- CT enterography for further assessment of bowel,  infection/abscess or other intraabdominal pathology    COMPARISON STUDY: CT chest abdomen and pelvis 10/23/2023    TECHNIQUE: CT scan of the abdomen and pelvis was performed on  multidetector CT scanner using volumetric acquisition technique and  images were reconstructed in  multiple planes with variable thickness  and reviewed on dedicated workstations.     CONTRAST: 88 mm Isovue 360 injected IV with 1500 mL Breeza oral  contrast    CT scan radiation dose is optimized to minimum requisite dose using  automated dose modulation techniques.    FINDINGS:    Lower thorax: New left basilar consolidative opacity in the  posterior/lateral lower lobe.  Trace left pleural effusion with  adjacent atelectasis.    Liver: No mass. No intrahepatic biliary ductal dilation.  Geographic  relative hyperenhancement of the portion of segment 7, likely  perfusional.    Biliary System: Normal gallbladder. No extrahepatic biliary ductal  dilation.    Pancreas: Significant fatty atrophy of the pancreatic parenchyma,  greatest at the pancreatic body and tail.    Adrenal glands: No mass or nodules    Spleen: Normal.    Kidneys: No suspicious mass, obstructing calculus or hydronephrosis.    Gastrointestinal tract: Colectomy and completion proctectomy with  ileal J-pouch formation. Fluid-filled small bowel which is not  abnormally distended. No focal lesions. No inflammatory stranding.    Mesentery/peritoneum/retroperitoneum: No mass. No free fluid or air.    Lymph nodes: No significant lymphadenopathy.    Vasculature: Patent major abdominal vasculature.    Pelvis: Urinary bladder is normal.  The uterus is within normal  limits. Bilateral thin-walled ovarian cysts measuring up to 3.3 cm on  the left and 3.2 cm on the righ. T    Osseous structures: No aggressive or acute osseous lesion.      Soft tissues: Soft tissue stranding in the anterior abdomen and right  of midline, likely postoperative.      Impression    IMPRESSION:   1. Surgical changes of total colectomy and completion proctectomy with  right lower quadrant diverting ileostomy. No evidence of infectious or  inflammatory bowel pathology. No intra-abdominal abscess.  2. New left lower lobe consolidative opacity, compatible with  pneumonia.  3. Trace left  pleural effusion with adjacent atelectasis.    I have personally reviewed the examination and initial interpretation  and I agree with the findings.    KEESHA WALKER DO         SYSTEM ID:  [id number]   US Upper Extremity Venous Duplex Right    Narrative    EXAMINATION: DOPPLER VENOUS ULTRASOUND OF THE RIGHT UPPER EXTREMITY,  11/4/2023 4:30 PM     COMPARISON: None    HISTORY: Pain and erythema in the medial right upper extremity    TECHNIQUE:  Gray-scale evaluation with compression, spectral flow and  color Doppler assessment of the deep venous system of the right upper  extremity.    FINDINGS:  Right: Normal blood flow and waveforms are demonstrated in the  internal jugular, innominate, subclavian, and axillary veins. The  right basilic vein in the axilla is fully compressible, however, from  the antecubital fossa to the mid upper arm the basilic vein is  noncompressible with echogenic luminal material. The right cephalic  vein from the mid forearm to the antecubital fossa also displays  noncompressibility.      Impression    IMPRESSION:  1.  No evidence of right upper extremity deep venous thrombosis.  2.  Superficial venous thromboses involving the right basilic vein and  right cephalic veins from the mid forearm to the antecubital fossa.  Both veins are patent at the axilla.    I have personally reviewed the examination and initial interpretation  and I agree with the findings.    AAYD QUIÑONEZ MD         SYSTEM ID:  [id number]     *Note: Due to a large number of results and/or encounters for the requested time period, some results have not been displayed. A complete set of results can be found in Results Review.       Discharge Medications   Current Discharge Medication List        CONTINUE these medications which have NOT CHANGED    Details   acetaminophen (TYLENOL) 500 MG tablet Take 1,000 mg by mouth every 6 hours as needed for mild pain      albuterol (PROAIR HFA/PROVENTIL HFA/VENTOLIN HFA) 108 (90 Base)  MCG/ACT inhaler Inhale 2 puffs into the lungs every 4 hours as needed for shortness of breath / dyspnea  Qty: 18 g, Refills: 1    Associated Diagnoses: Bronchitis      Cyanocobalamin (B-12 PO) Take 1 tablet by mouth daily      hydrOXYzine (ATARAX) 25 MG tablet Take 1 tablet (25 mg) by mouth 2 times daily  Qty: 60 tablet, Refills: 1    Associated Diagnoses: Acute post-operative pain      loperamide (IMODIUM A-D) 2 MG tablet Take 1 tablet (2 mg) by mouth 4 times daily as needed for other (high ileostomy output)  Qty: 120 tablet, Refills: 1    Associated Diagnoses: Ileostomy in place (H)      medical cannabis (Patient's own supply) See Admin Instructions (The purpose of this order is to document that the patient reports taking medical cannabis.  This is not a prescription, and is not used to certify that the patient has a qualifying medical condition.)      metFORMIN (GLUCOPHAGE) 500 MG tablet TAKE 2 TABLETS BY MOUTH 2 TIMES DAILY WITH MEALS  Qty: 360 tablet, Refills: 1    Associated Diagnoses: Diabetes mellitus, iatrogenic (H)      ondansetron (ZOFRAN ODT) 4 MG ODT tab Take 1 tablet (4 mg) by mouth every 8 hours as needed for nausea or vomiting  Qty: 10 tablet, Refills: 1    Associated Diagnoses: Chronic gastritis, presence of bleeding unspecified, unspecified gastritis type      venlafaxine (EFFEXOR) 75 MG tablet TAKE TWO TABLETS BY MOUTH TWICE A DAY  Qty: 360 tablet, Refills: 0    Associated Diagnoses: Current episode of major depressive disorder without prior episode, unspecified depression episode severity      diphenoxylate-atropine (LOMOTIL) 2.5-0.025 MG tablet Take 1 tablet by mouth 2 times daily  Qty: 60 tablet, Refills: 2    Associated Diagnoses: High output ileostomy (H)      famotidine (PEPCID) 20 MG tablet Take 1 tablet (20 mg) by mouth 2 times daily  Qty: 60 tablet, Refills: 0    Associated Diagnoses: Ulcerative pancolitis with rectal bleeding (H)      methocarbamol (ROBAXIN) 750 MG tablet Take 1 tablet  (750 mg) by mouth 4 times daily as needed for muscle spasms  Qty: 40 tablet, Refills: 0    Associated Diagnoses: Ulcerative pancolitis with rectal bleeding (H)      Ostomy Supplies MISC 20 each daily  Qty: 20 each, Refills: 11    Comments: [place] Precut [id number]  x20 each  Keith  [id number] pouch ,  x20 each  [id number] Sarah ring   x20 each   7760 adhesive remover,  X 1 box   7299 belt   x1 each  Change barrier and pouch 3xweek  Associated Diagnoses: Ileostomy status (H)      phentermine (ADIPEX-P) 15 MG capsule Take 15 mg by mouth every morning           Allergies   Allergies   Allergen Reactions    Bee Venom Swelling    Azithromycin Diarrhea    Erythromycin      Other reaction(s): GI intolerance, Vomiting    Fentanyl Other (See Comments)     sweating  sweating    Prochlorperazine Fatigue     Other reaction(s): Other (see comments)  Fatigue    Buspirone      Other reaction(s): GI intolerance  vomiting    Erythrocin Nausea and Vomiting    Gluten Meal      Celiac disease    Topamax [Topiramate]      Made her lethargic    Zithromax [Azithromycin Dihydrate] Diarrhea    Enbrel [Etanercept] Hives and Rash

## 2023-11-06 NOTE — PROGRESS NOTES
[facility]    Internal Medicine Progress Note - Terrie 2 Service    Main Plans for Today   - Increase psyllium to TID, Increase Imodium to 4mg QID   - OK for PIV in LE if unable to access right arm. Appreciate lymphedema consult. Hopeful to discharge with PIV instead of PICC for fluids at home   - 1L bolus LR   - Strep throat swab, respiratory panel  - Monitor ileostomy output; if < 1.5L over 24 hours possible discharge 11/07    Assessment & Plan   Doretha Yu is a 48 year old female admitted on 10/30/2023. 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 Pt s/p TAC with end ileostomy (laparoscopic) on 6/15/21 with subsequent parastomal hernia now s/p robotic protectectomy, ileostomy takedown, and creation of J-pouch and diverting loop ileostomy on 9/5/23. 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 Pt presents with fever x 3 days and increased ostomy output x 2 weeks, admitted at recommendation of GI. Pt states that she has had increased ostomy output for the last two weeks. Fever for the last 3 days. Pt states she has had very poor po intake for the last few days. No urinary sx, shortness of breath, cough. No recent travel. Lives with 17 y/o daughter. No recent abx.     Nivolumab induced UC  Increased ostomy output  Severe protein calorie malnutrition   H/o nivolumab-induced ulcerative colitis s/p TAC with end ileostomy (laparoscopic) on 6/15/21 with subsequent parastomal hernia now s/p robotic protectectomy, ileostomy takedown, and creation of J-pouch and diverting loop ileostomy on 9/5/23. Pt states since this time she has noticed increased ostomy output, but in the last two weeks much more so with correlating reduced PO intake. Has been seen in the  ED on several occasions and has been getting OP IVF at an infusion center as output so significant. No blood in output. No recent abx.      FLUIDS              - 1L bolus LR     WORKUP              - Per GI as below                      - Blood cultures have not shown growth               - C. Diff: Negative              - UA moderate LE and few bacteria, UC mixed urogenital herlinda              - Amylase: WNL              - Urine legionella: Negative              - Enteric Bacteria and Virus: Negative              - Fecal calprotectin: WNL  - CT enterography: LLL consolidation, surgical changes of total colectomy and completion proctectomy w/R quadrant diverting ileostomy w/o evidence of infectious or inflammatory bowel pathology     ABX              - Zosyn 10/30-11/03     CONSULTS              - GI consulted, appreciate recs  - Infectious Stool Studies: C. Difficile Toxin B PCR with reflex to C. Difficile Antigen and Toxins A/B EIA, Enteric Bacteria and Virus Panel PCR  - Fecal Calprotectin  - Trend daily labs: CBC and CMP  - CT Enterography for further assessment of bowel, infection/abscess, or other intra-abdominal pathology  - IV Pantoprazole 40 mg BID  - Loperamide 4mg TID, give 30 min before meals  - Avoid artificial sugars   - Strict documentation of I/O  - Defer to CR surgery for post-operative loose stool management   - VTE prophylaxis   - Health psychology consult for coping strategies  - Psychiatry consult for mental health medical management  - Output goal < 1L/day  - Attempt regular gluten free diet, but if not maintaining PO, next consideration would be [place]              - Ensure Max Protein Supplements              - Resume PTA fluids   - Follow up in 4 weeks with [facility] GI    - Colorectal surgery consulted, appreciate recs              - Agree with strict documentation of I/O  - Dietician consult: Education, medical food supplementation therapy              - WOCN for ostomy supplies: Patient given  deep convex coloplast fecal pouches with silvia rings and comfeel to use as brava strips              - Recommends feeding tube next if she cannot maintain adequate caloric intake  - Added fiber 11/04, doubled 11/05   - Increase psyllium to TID  - Increase Imodium to 4mg QID      Pneumonia  CT enterography shows possible infiltrate LLL, well covered with zosyn, will add azithromycin for atypical coverage while undergoing workup. Urine Legionella and Strep as well as respiratory viral panel negative.   - Zosyn as above + doxycycline added 500 mg (10/30-11/03)     Hypomagnesemia  Likely 2/2 diarrhea.  -Resolved at 1.8 on 11/02 with RN magnesium protocol, will continue for now     Aphthous ulcers  Sores on upper lip, lower lip, and L side of tongue. Endorses these occur when she is sick. May be stress induced or another manifestation of her autoimmune illnesses.   - Consult dental, recs appreciated              - Biotene   - Lidocaine swish and spit   - Triamcinolone paste     Hyponatremia  Likely hypovolemic hyponatremia in setting of increased UOP. To be complete can get baseline studies. Urine Legionella negative. Osmolality decreased in blood 271 and urine 86.   - Resolved      Chronic Transaminitis  - Pending above workup  - Hepatitis panel negative   - Resolving with hydration     Depression  Anxiety  Patient endorses struggle coping with chemotherapy-induced autoimmune diseases that have led to GI surgeries and complications. Depression and anxiety are uncontrolled even on EFFEXOR (generic venlafaxine is refused). Has asked for help from psych when talking to GI.   - Currently PTA venlafaxine 150mg BID  - Consult psychology for coping strategies and support              - Will follow 1x/week  - Consult psychiatry with help with meds, recs appreciated              - Add aripiprazole 2mg po qam      DM2  A1c 6.5% last 10/25. Takes metformin 500 BID as OP.   - Hold metformin  - LDSSI     Hypothyroidism  - Do not see  PTA meds on list     Normocytic Anemia  Baseline Hb 13, here with Hb of 9.5 on 11/04. Plts wnl.      STORMY  - CPAP     Chronic Pain  - APAP  - oxycodone 5 mg q 4  - Dilaudid 0.2-0.5 q 3 for short term only     Prothrombin Mutation  She had genetic testing done back when her first child was born and she had a CVA. It showed prothrombin mutation, not deficiency (deficiency may have been accidentally charted by others at some point). As a result, she was placed on prophylactic aspirin but was taken off when the UC symptoms began. This mutation makes her relatively more prothrombotic, will continue Lovenox IP as IBD makes you more thrombotic.   - Consider hematology as outpatient due to hypercoagulability with prothrombin mutation + IBD     Melanoma history:   - see H&P subjective section for full history, follows with [place].      DISCHARGE PLANNING:       MEDS TO STOP/HOLD:   - Hold hydroxyzine   - Hold methocarbamol      FOLLOWUP:   - Follow up in 4 weeks with [facility] GI  - Scheduled for upcoming Ileostomy closure on 12/27/2023 with Dr. Ram   - Consider hematology as outpatient due to hypercoagulability with prothrombin mutation + IBD     Diet: Snacks/Supplements Adult: Other; With all meals please send: 1 beneprotein powder, 1 Banatrol Plus, 1 greek yogurt; With Meals  Snacks/Supplements Adult: [employer] Standard 1.4 Oral Supplement; With Meals  Snacks/Supplements Adult: [employer] Standard Oral Supplement; With Meals  Gluten Free Diet  Calorie Counts  Fluids: 125cc/hr discontinued, bolus 1L LR   Lines: PIV  Major Catheter: Not present     DVT Prophylaxis: Enoxaparin 40mg every day subq  Code Status: Full Code     Marcus Holguin DDS  OMFS PGY-1 on IM    Pt staffed with Dr. Enoc Robledo MD who agrees with this plan.     Interval History   Patient lying comfortably in bed on encounter in mild distress. She continues to complain of 6/10 pain despite being compliant with her scheduled regimen. She states she is  experiencing pain in her throat, chest, and abdomen  none of which is new. She has been tolerating her diet but is concerned with her increased output. She does note the consistency is better, which is encouraging. This morning, she feels subjectively worse and that she is sick. She states she felt better after finishing her course of antibiotics but is now feeling worse. She otherwise denies f/c/n/v.       Physical Exam   Vital Signs: Temp: 99  F (37.2  C) Temp src: Oral BP: 120/70 Pulse: 82   Resp: 16 SpO2: 93 % O2 Device: None (Room air)    Weight: 198 lbs 4.8 oz  General: Pt laying comfortably in bed, in no acute distress. Not requiring oxygen.   Cardio: Regular rate and rhythm. No murmurs, gallops, rubs.   Pulm: All lung fields clear without wheezes, crackles, or rhonchi.    Abd: Active bowel sounds. Tenderness to palpation in upper and lower quadrants, more severe along midline, minimal guarding or rebound.   HEENT: White ulcers on upper and lower lip ~1cm, 0.5cm bilateral tongue.  MSK: Erythematous presumed hang nail lesions on several fingers of both hands, no visible drainage. RUE noted to be more edematous than the L with a 3x3cm bruise near site of peripheral line. The edematous region is not erythematous. Has mild tenderness and warmth upon palpation.   Lower extremities: No lower extremity edema present bilaterally.   Neuro: Alert and oriented to person, place, and time.   Psych: Appropriate mood and affec       Data

## 2023-11-06 NOTE — PROVIDER NOTIFICATION
7C [id number] TRACY Coyne  Pt c/o headache with minor relief from PRN dilaudid and tylenol. Anything else we can give her that aren't NSAIDS?  Nydia [phone]

## 2023-11-07 ENCOUNTER — APPOINTMENT (OUTPATIENT)
Dept: OCCUPATIONAL THERAPY | Facility: CLINIC | Age: 48
DRG: 385 | End: 2023-11-07
Payer: COMMERCIAL

## 2023-11-07 LAB
ERYTHROCYTE [DISTWIDTH] IN BLOOD BY AUTOMATED COUNT: 16.5 % (ref 10–15)
GLUCOSE BLDC GLUCOMTR-MCNC: 123 MG/DL (ref 70–99)
GLUCOSE BLDC GLUCOMTR-MCNC: 134 MG/DL (ref 70–99)
GLUCOSE BLDC GLUCOMTR-MCNC: 150 MG/DL (ref 70–99)
GLUCOSE BLDC GLUCOMTR-MCNC: 88 MG/DL (ref 70–99)
GLUCOSE BLDC GLUCOMTR-MCNC: 97 MG/DL (ref 70–99)
HCT VFR BLD AUTO: 30.5 % (ref 35–47)
HGB BLD-MCNC: 9.6 G/DL (ref 11.7–15.7)
MAGNESIUM SERPL-MCNC: 1.8 MG/DL (ref 1.7–2.3)
MCH RBC QN AUTO: 27 PG (ref 26.5–33)
MCHC RBC AUTO-ENTMCNC: 31.5 G/DL (ref 31.5–36.5)
MCV RBC AUTO: 86 FL (ref 78–100)
PLATELET # BLD AUTO: 449 10E3/UL (ref 150–450)
RBC # BLD AUTO: 3.56 10E6/UL (ref 3.8–5.2)
WBC # BLD AUTO: 10.5 10E3/UL (ref 4–11)

## 2023-11-07 PROCEDURE — 83735 ASSAY OF MAGNESIUM: CPT | Performed by: STUDENT IN AN ORGANIZED HEALTH CARE EDUCATION/TRAINING PROGRAM

## 2023-11-07 PROCEDURE — 250N000013 HC RX MED GY IP 250 OP 250 PS 637

## 2023-11-07 PROCEDURE — G0463 HOSPITAL OUTPT CLINIC VISIT: HCPCS

## 2023-11-07 PROCEDURE — 250N000013 HC RX MED GY IP 250 OP 250 PS 637: Performed by: PHYSICIAN ASSISTANT

## 2023-11-07 PROCEDURE — 250N000013 HC RX MED GY IP 250 OP 250 PS 637: Performed by: STUDENT IN AN ORGANIZED HEALTH CARE EDUCATION/TRAINING PROGRAM

## 2023-11-07 PROCEDURE — 250N000011 HC RX IP 250 OP 636

## 2023-11-07 PROCEDURE — 85027 COMPLETE CBC AUTOMATED: CPT

## 2023-11-07 PROCEDURE — 99232 SBSQ HOSP IP/OBS MODERATE 35: CPT | Mod: GC | Performed by: STUDENT IN AN ORGANIZED HEALTH CARE EDUCATION/TRAINING PROGRAM

## 2023-11-07 PROCEDURE — 97535 SELF CARE MNGMENT TRAINING: CPT | Mod: GO

## 2023-11-07 PROCEDURE — 120N000002 HC R&B MED SURG/OB UMMC

## 2023-11-07 PROCEDURE — 250N000011 HC RX IP 250 OP 636: Mod: JZ | Performed by: STUDENT IN AN ORGANIZED HEALTH CARE EDUCATION/TRAINING PROGRAM

## 2023-11-07 PROCEDURE — 250N000011 HC RX IP 250 OP 636: Performed by: STUDENT IN AN ORGANIZED HEALTH CARE EDUCATION/TRAINING PROGRAM

## 2023-11-07 PROCEDURE — 36415 COLL VENOUS BLD VENIPUNCTURE: CPT | Performed by: STUDENT IN AN ORGANIZED HEALTH CARE EDUCATION/TRAINING PROGRAM

## 2023-11-07 RX ORDER — GUAIFENESIN 600 MG/1
600 TABLET, EXTENDED RELEASE ORAL 2 TIMES DAILY
Status: DISCONTINUED | OUTPATIENT
Start: 2023-11-07 | End: 2023-11-08 | Stop reason: HOSPADM

## 2023-11-07 RX ORDER — DIPHENOXYLATE HCL/ATROPINE 2.5-.025MG
1 TABLET ORAL
Status: DISCONTINUED | OUTPATIENT
Start: 2023-11-07 | End: 2023-11-08 | Stop reason: HOSPADM

## 2023-11-07 RX ORDER — MAGNESIUM SULFATE HEPTAHYDRATE 40 MG/ML
2 INJECTION, SOLUTION INTRAVENOUS ONCE
Status: COMPLETED | OUTPATIENT
Start: 2023-11-07 | End: 2023-11-07

## 2023-11-07 RX ORDER — DIPHENOXYLATE HCL/ATROPINE 2.5-.025MG
1 TABLET ORAL 4 TIMES DAILY PRN
Status: CANCELLED | OUTPATIENT
Start: 2023-11-07

## 2023-11-07 RX ADMIN — LOPERAMIDE HYDROCHLORIDE 4 MG: 2 CAPSULE ORAL at 22:16

## 2023-11-07 RX ADMIN — GUAIFENESIN 600 MG: 600 TABLET ORAL at 22:08

## 2023-11-07 RX ADMIN — HYDROMORPHONE HYDROCHLORIDE 1 MG: 2 TABLET ORAL at 01:36

## 2023-11-07 RX ADMIN — DIPHENOXYLATE HYDROCHLORIDE AND ATROPINE SULFATE 1 TABLET: 2.5; .025 TABLET ORAL at 15:44

## 2023-11-07 RX ADMIN — VENLAFAXINE 150 MG: 75 TABLET ORAL at 08:47

## 2023-11-07 RX ADMIN — VENLAFAXINE 150 MG: 75 TABLET ORAL at 22:08

## 2023-11-07 RX ADMIN — PSYLLIUM HUSK 1 PACKET: 3.4 POWDER ORAL at 22:17

## 2023-11-07 RX ADMIN — Medication 1 LOZENGE: at 17:40

## 2023-11-07 RX ADMIN — PANTOPRAZOLE SODIUM 40 MG: 40 TABLET, DELAYED RELEASE ORAL at 15:43

## 2023-11-07 RX ADMIN — PSYLLIUM HUSK 1 PACKET: 3.4 POWDER ORAL at 14:00

## 2023-11-07 RX ADMIN — PSYLLIUM HUSK 1 PACKET: 3.4 POWDER ORAL at 08:47

## 2023-11-07 RX ADMIN — PANTOPRAZOLE SODIUM 40 MG: 40 TABLET, DELAYED RELEASE ORAL at 08:48

## 2023-11-07 RX ADMIN — HYDROMORPHONE HYDROCHLORIDE 1 MG: 2 TABLET ORAL at 17:40

## 2023-11-07 RX ADMIN — LOPERAMIDE HYDROCHLORIDE 4 MG: 2 CAPSULE ORAL at 12:03

## 2023-11-07 RX ADMIN — GUAIFENESIN AND DEXTROMETHORPHAN 10 ML: 100; 10 SYRUP ORAL at 17:40

## 2023-11-07 RX ADMIN — HYDROMORPHONE HYDROCHLORIDE 1 MG: 2 TABLET ORAL at 22:08

## 2023-11-07 RX ADMIN — HYDROMORPHONE HYDROCHLORIDE 1 MG: 2 TABLET ORAL at 09:32

## 2023-11-07 RX ADMIN — LOPERAMIDE HYDROCHLORIDE 4 MG: 2 CAPSULE ORAL at 15:43

## 2023-11-07 RX ADMIN — ACETAMINOPHEN 650 MG: 325 TABLET, FILM COATED ORAL at 08:51

## 2023-11-07 RX ADMIN — ACETAMINOPHEN 650 MG: 325 TABLET, FILM COATED ORAL at 01:36

## 2023-11-07 RX ADMIN — HYDROMORPHONE HYDROCHLORIDE 1 MG: 2 TABLET ORAL at 13:26

## 2023-11-07 RX ADMIN — GUAIFENESIN 600 MG: 600 TABLET ORAL at 12:03

## 2023-11-07 RX ADMIN — MAGNESIUM SULFATE IN WATER 2 G: 40 INJECTION, SOLUTION INTRAVENOUS at 08:51

## 2023-11-07 RX ADMIN — ARIPIPRAZOLE 2 MG: 2 TABLET ORAL at 08:48

## 2023-11-07 RX ADMIN — ENOXAPARIN SODIUM 40 MG: 40 INJECTION SUBCUTANEOUS at 13:26

## 2023-11-07 RX ADMIN — GUAIFENESIN AND DEXTROMETHORPHAN 10 ML: 100; 10 SYRUP ORAL at 01:36

## 2023-11-07 RX ADMIN — INSULIN ASPART 1 UNITS: 100 INJECTION, SOLUTION INTRAVENOUS; SUBCUTANEOUS at 17:41

## 2023-11-07 RX ADMIN — ONDANSETRON HYDROCHLORIDE 4 MG: 4 TABLET, FILM COATED ORAL at 09:05

## 2023-11-07 RX ADMIN — GUAIFENESIN AND DEXTROMETHORPHAN 10 ML: 100; 10 SYRUP ORAL at 08:51

## 2023-11-07 RX ADMIN — DIPHENOXYLATE HYDROCHLORIDE AND ATROPINE SULFATE 1 TABLET: 2.5; .025 TABLET ORAL at 08:48

## 2023-11-07 RX ADMIN — LOPERAMIDE HYDROCHLORIDE 4 MG: 2 CAPSULE ORAL at 08:47

## 2023-11-07 RX ADMIN — HYDROMORPHONE HYDROCHLORIDE 1 MG: 2 TABLET ORAL at 06:30

## 2023-11-07 ASSESSMENT — ACTIVITIES OF DAILY LIVING (ADL)
ADLS_ACUITY_SCORE: 20

## 2023-11-07 NOTE — PLAN OF CARE
"/71 (BP Location: Left leg, Patient Position: Supine, Cuff Size: Adult Regular)   Pulse 90   Temp 98.5  F (36.9  C) (Oral)   Resp 16   Wt 89.9 kg (198 lb 4.8 oz)   SpO2 92%   BMI 36.27 kg/m       Care from: 2300 - 0730    VS & Pain: VSS. Endorsing abdominal, chest, and head pain and given prn tylenol x1 and dilaudid x2 with some improvement.  Neuro: A&Ox4.  Respiratory: Frequent cough - given prn robitussin x1. Vitally stable on RA.  GI/: Ileostomy in place and WD with 250cc output. Voiding WDL.  Nutrition: Gluten free.  Skin: No new skin concerns this shift  Lines: R leg piv SL  Activity: Stand-by assist  Events this shift: Call light within reach and able to make needs known. BG 97 overnight.    Plan: Per colorectal surgery note, \"Possibly home tomorrow if ileostomy output less than 1.5L. If that is the case, she can be discharged without the 3x weekly IV fluid infusions she was getting previously. \"      Goal Outcome Evaluation:      Plan of Care Reviewed With: patient    Overall Patient Progress: no changeOverall Patient Progress: no change    Outcome Evaluation: Given prn dilaudid, tylenol, and robitussin overnight.      "

## 2023-11-07 NOTE — PROGRESS NOTES
[facility]    Internal Medicine Progress Note - Terrie 2 Service    Main Plans for Today  - Lomotil BID  - Downtrending ileostomy output: 1.2L (last 24 hours) < 1.6L < 1.8L  - Respiratory panel, strep throat swab negative  - CR surgery OK with discharge, follow up outpatient in 1-2 weeks  - IVF 3x week - due to difficult IV access, CC looking into infusion center [facility] (~10 miles from home)     Assessment & Plan  Doretha Yu is a 48 year old female admitted on 10/30/2023. 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 Pt s/p TAC with end ileostomy (laparoscopic) on 6/15/21 with subsequent parastomal hernia now s/p robotic protectectomy, ileostomy takedown, and creation of J-pouch and diverting loop ileostomy on 9/5/23. 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 Pt presents with fever x 3 days and increased ostomy output x 2 weeks, admitted at recommendation of GI. Pt states that she has had increased ostomy output for the last two weeks. Fever for the last 3 days. Pt states she has had very poor po intake for the last few days. No urinary sx, shortness of breath, cough. No recent travel. Lives with 15 y/o daughter. No recent abx.      Nivolumab induced UC  Increased ostomy output  Severe protein calorie malnutrition   H/o nivolumab-induced ulcerative colitis s/p TAC with end ileostomy (laparoscopic) on 6/15/21 with subsequent parastomal hernia now s/p robotic protectectomy, ileostomy takedown, and creation of J-pouch and diverting loop ileostomy on 9/5/23. Pt states since this time she has noticed increased ostomy output, but in the last two weeks much more so with correlating reduced PO intake. Has been seen in the ED on several occasions and  has been getting OP IVF at an infusion center as output so significant. No blood in output. No recent abx.      FLUIDS              - 1L bolus LR prn     WORKUP              - Per GI as below                      - Blood cultures have not shown growth               - C. Diff: Negative              - UA moderate LE and few bacteria, UC mixed urogenital herlinda              - Amylase: WNL              - Urine legionella: Negative              - Enteric Bacteria and Virus: Negative              - Fecal calprotectin: WNL  - CT enterography: LLL consolidation, surgical changes of total colectomy and completion proctectomy w/R quadrant diverting ileostomy w/o evidence of infectious or inflammatory bowel pathology     ABX              - Zosyn 10/30-11/03     CONSULTS              - GI consulted, appreciate recs  - Infectious Stool Studies: C. Difficile Toxin B PCR with reflex to C. Difficile Antigen and Toxins A/B EIA, Enteric Bacteria and Virus Panel PCR  - Fecal Calprotectin  - Trend daily labs: CBC and CMP  - CT Enterography for further assessment of bowel, infection/abscess, or other intra-abdominal pathology  - IV Pantoprazole 40 mg BID  - Loperamide 4mg TID, give 30 min before meals  - Avoid artificial sugars   - Strict documentation of I/O  - Defer to CR surgery for post-operative loose stool management   - VTE prophylaxis   - Health psychology consult for coping strategies  - Psychiatry consult for mental health medical management  - Output goal < 1L/day  - Attempt regular gluten free diet, but if not maintaining PO, next consideration would be [place]              - Ensure Max Protein Supplements              - Resume PTA fluids   - Follow up in 4 weeks with [facility] GI    - Colorectal surgery consulted, appreciate recs              - Agree with strict documentation of I/O  - Dietician consult: Education, medical food supplementation therapy              - WOCN for ostomy supplies: Patient given deep convex coloplast  fecal pouches with silvia rings and comfeel to use as brava strips              - Recommends feeding tube next if she cannot maintain adequate caloric intake  - Added fiber 11/04, doubled 11/05   - Increase psyllium to TID  - Increase Imodium to 4mg QID   - Added Lomitol 1 capsule BID prior to meals     Pneumonia  CT enterography shows possible infiltrate LLL, well covered with zosyn, will add azithromycin for atypical coverage while undergoing workup. Urine Legionella and Strep as well as respiratory viral panel negative.   - Zosyn as above + doxycycline added 500 mg (10/30-11/03)     Hypomagnesemia  Likely 2/2 diarrhea.  -Resolved at 1.8 on 11/02 with RN magnesium protocol, will continue for now     Aphthous ulcers  Sores on upper lip, lower lip, and L side of tongue. Endorses these occur when she is sick. May be stress induced or another manifestation of her autoimmune illnesses.   - Consult dental, recs appreciated              - Biotene   - Lidocaine swish and spit   - Triamcinolone paste     Hyponatremia  Likely hypovolemic hyponatremia in setting of increased UOP. To be complete can get baseline studies. Urine Legionella negative. Osmolality decreased in blood 271 and urine 86.   - Resolved      Chronic Transaminitis  - Pending above workup  - Hepatitis panel negative   - Resolving with hydration     Depression  Anxiety  Patient endorses struggle coping with chemotherapy-induced autoimmune diseases that have led to GI surgeries and complications. Depression and anxiety are uncontrolled even on EFFEXOR (generic venlafaxine is refused). Has asked for help from psych when talking to GI.   - Currently PTA venlafaxine 150mg BID  - Consult psychology for coping strategies and support              - Will follow 1x/week  - Consult psychiatry with help with meds, recs appreciated              - Add aripiprazole 2mg po qam      DM2  A1c 6.5% last 10/25. Takes metformin 500 BID as OP.   - Hold metformin  [facility]      Hypothyroidism  - Do not see PTA meds on list     Normocytic Anemia  Baseline Hb 13, here with Hb of 9.5 on 11/04. Plts wnl.      STORMY  - CPAP     Chronic Pain  - APAP  - oxycodone 5 mg q 4  - Dilaudid 0.2-0.5 q 3 for short term only     Prothrombin Mutation  She had genetic testing done back when her first child was born and she had a CVA. It showed prothrombin mutation, not deficiency (deficiency may have been accidentally charted by others at some point). As a result, she was placed on prophylactic aspirin but was taken off when the UC symptoms began. This mutation makes her relatively more prothrombotic, will continue Lovenox IP as IBD makes you more thrombotic.   - Consider hematology as outpatient due to hypercoagulability with prothrombin mutation + IBD     Melanoma history:   - see H&P subjective section for full history, follows with [facility].      DISCHARGE PLANNING:       MEDS TO STOP/HOLD:   - Hold hydroxyzine   - Hold methocarbamol      FOLLOWUP:   - Follow up in 4 weeks with [facility] GI  - Scheduled for upcoming Ileostomy closure on 12/27/2023 with Dr. Ram   - Consider hematology as outpatient due to hypercoagulability with prothrombin mutation + IBD     Diet: Snacks/Supplements Adult: Other; With all meals please send: 1 beneprotein powder, 1 Banatrol Plus, 1 greek yogurt; With Meals  Snacks/Supplements Adult: [email or web address] Standard 1.4 Oral Supplement; With Meals  Snacks/Supplements Adult: [email or web address] Standard Oral Supplement; With Meals  Gluten Free Diet  Calorie Counts  Fluids: 125cc/hr discontinued, bolus 1L LR   Lines: PIV  Major Catheter: Not present     DVT Prophylaxis: Enoxaparin 40mg every day subq  Code Status: Full Code     Marcus Holguin DDS  OMFS PGY-1 on [facility]    Pt staffed with Dr. Madeline Easton who agrees with this plan    Interval History   Patient in moderate emotional distress given her frustration with the complications that have arose from her previous  surgery. She also is mildly upset given the conflicting recommendations from her primary team and CR surgery. Doretha was under the impression she would not be discharged for 1-2 more days. She reports she is confused as to why we are considering discharge despite her output being the same as upon her admission. She continues to complain of pain from her abdomen, chest, and head. Her cough and throat pain has improved.        Physical Exam   Vital Signs: Temp: 98.5  F (36.9  C) Temp src: Oral BP: 128/71 Pulse: 90   Resp: 16 SpO2: 92 % O2 Device: None (Room air)    Weight: 195 lbs 5.24 oz  General: Pt laying comfortably in bed, in no acute distress. Not requiring oxygen.   Cardio: Regular rate and rhythm. No murmurs, gallops, rubs.   Pulm: All lung fields clear without wheezes, crackles, or rhonchi.    Abd: Active bowel sounds. Tenderness to palpation in upper and lower quadrants, more severe along midline, minimal guarding or rebound.   HEENT: White ulcers on upper and lower lip ~1cm, 0.5cm bilateral tongue.  MSK: Erythematous presumed hang nail lesions on several fingers of both hands, no visible drainage. RUE noted to be more edematous than the L with a 3x3cm bruise near site of peripheral line. The edematous region is not erythematous. Has mild tenderness and warmth upon palpation.   Lower extremities: No lower extremity edema present bilaterally.   Neuro: Alert and oriented to person, place, and time.   Psych: Appropriate mood and affect.      Data

## 2023-11-07 NOTE — PLAN OF CARE
Goal Outcome Evaluation:      Plan of Care Reviewed With: patient    Overall Patient Progress: no changeOverall Patient Progress: no change       Pt is alert, orientated, dilaudid for pain po, started on scheduled lomotil today, also takes imodium and metamucil, close I/O monitoring, pt reports not feeling well, tired with congestion, voices concerns that she will be discharged soon, potentially tomorrow, up ind in room, showered ind after setup

## 2023-11-07 NOTE — PLAN OF CARE
Goal Outcome Evaluation:      Plan of Care Reviewed With: patient    Overall Patient Progress: no changeOverall Patient Progress: no change         At the beginning of the evening, the patient was very tearful due to not feeling well. Pt complained of her head aching, stuffy nose, sore throat, cough, nausea and abdominal pain. Pt received tylenol, Dilaudid, Zofran, Robitussin and a throat lozenge. Pt was also tearful because she had a poor appetite but she wanted to eat more. Pt ate 25% of her meal. Pt had a J pouch connected to a bag. PIV in the patient's right leg. Pt able to make her needs known. Continue with plan of care.

## 2023-11-07 NOTE — PROGRESS NOTES
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  Pt has had a tough post-op course and has had high ileostomy outputs requiring outpatient IV fluids.  Now admitted on 10/30 with 3-4 days of fever, increased ileostomy outputs and continued J-pouch drainage.     Assessment:      Areas visualized during today's visit: Abdomen    Assessment of Established loop Ileostomy:  How long has patient had ostomy: 9/2023  Stoma: retracted/flat, divot at 7 o clock   Mucutaneous junction: intact  Peristomal skin: up to 1 cm of maceration slight rash at edges of where pouch wafer is applied, mild   Output: watery, liquid, and undigested food     Is patient independent with ostomy care?: Yes  Home pouching system: deep convex coloplast fecal pouch with silvia ring, brava strips and belt    Pouching issues and/or educational needs: significant output causing difficulty sleeping due to need to empty all the time   Interventions completed today: Pouching system assessment   Pouching system in use while hospitalized:  Coloplast one piece, convex, barrier ring, and ostomy strips-- now trialing high output concha pouch with bedside drainage bag, one piece soft convex with coloplast convex barrier ring, silvia ring, brava strips and belt  Supplies location: gathered and at bedside     Met with patient, stating that it has been hard to adjust to having a drainage bag attached but it has allowed her to sleep through the night.  Previously she was having to get up every half hour to empty leading to lack of sleep and exhaustion, -high output system  "placed 11/2 and 11/5, minimal melting,   Treatment Plan:     RLQ Ileostomy pouching plan:   Pouching system: ostomy supplies pouches: convex high output (special order)    Accessories used: [facility] ostomy accessories: 2\" Sarah Ring (439112), Large Belt (543960), Brava Barrier Strip (143889), and coloplast convex barrier ring (special order)    Frequency of pouch changes: Twice weekly and PRN leakage  WOC follow up plan: Weekly   Bedside RN interventions: Change pouch PRN if leaking using the supplies above, Empty pouch when 1/3 to 1/2 full, ensure to clean pouch outlet after emptying to prevent odor, Notify WOC for ongoing pouch leakage, and Document stoma appearance and output volume, color, and consistency every shift     DME for high output system:  Request the following supplies:      [place]    1 piece soft convex high output drainable soft tap cut to fit up to 1-1/2 inch #81372    Accessories  [place] high output collection bag #5551  2  Sarah ring #[id number]     Coloplast Brava convex barrier ring 1 inch #26568    Adapt powder #7906    No sting film barrier # 3345                          Brava elastic barrier strips curved # [id number]    Keith belt large #2067 (29-49 )       Orders: Written    RECOMMEND PRIMARY TEAM ORDER: None, at this time  Education provided: plan of care  Discussed plan of care with: Patient  WOC nurse follow-up plan: twice weekly  Notify WOC if wound(s) deteriorate.  Nursing to notify the Provider(s) and re-consult the WOC Nurse if new skin concern.    DATA:     Current support surface: Standard  Standard gel/foam mattress (IsoFlex, Atmos air, etc)  Containment of urine/stool: Ileostomy pouch  BMI: Body mass index is 35.73 kg/m .   Active diet order: Orders Placed This Encounter      Gluten Free Diet     Output: I/O last 3 completed shifts:  In: 1440 [P.O.:1440]  Out: 1475 [Stool:1475]     Labs:   Recent Labs   Lab 11/07/23  0703 11/06/23  0634 11/04/23  0635   ALBUMIN  --   --  3.0* "   HGB 9.6*   < > 9.5*   WBC 10.5   < > 6.8    < > = values in this interval not displayed.     Pressure injury risk assessment:   Sensory Perception: 4-->no impairment  Moisture: 4-->rarely moist  Activity: 3-->walks occasionally  Mobility: 4-->no limitation  Nutrition: 2-->probably inadequate  Friction and Shear: 3-->no apparent problem  Lv Score: 20      Pager no longer is use, please contact through [facility]   Patricia group: [facility] Nurse [place]   Dept. Office Number: [phone]

## 2023-11-07 NOTE — PROGRESS NOTES
Colorectal Surgery Progress Note  [facility]    Subjective: endorses feeling poorly this morning.  Abdominal pain, cough, sore throat, intermittent nausea.  From 7am-7am continues to have about 1.5L of ileo output.     Vitals:  Vitals:    11/06/23 0500 11/06/23 1411 11/06/23 2307 11/07/23 0618   BP: 120/70 (!) 169/90 138/72 128/71   BP Location: Right arm Left leg Left leg Left leg   Patient Position:    Supine   Cuff Size:    Adult Regular   Pulse: 82 81 89 90   Resp: 16 14 16 16   Temp: 99  F (37.2  C) 98.9  F (37.2  C) 98.6  F (37  C) 98.5  F (36.9  C)   TempSrc: Oral Axillary Oral Oral   SpO2: 93% 93% 94% 92%   Weight:         I/O:  I/O last 3 completed shifts:  In: 1440 [P.O.:1440]  Out: 1475 [Stool:1475]    Physical Exam:  Gen: AAOx3, NAD  Pulm: Non-labored breathing.  + cough continues this morning.   Abd: Soft, non-distended, minimally tender diffusely.     Stoma pink and viable with mixed watery/applesauce consistency of output  Ext:  Warm and well-perfused    BMP  Recent Labs   Lab 11/07/23  0200 11/06/23  2259 11/06/23  2039 11/06/23  1549 11/06/23  0757 11/06/23  0634 11/05/23  0820 11/05/23  0724 11/04/23  0854 11/04/23  0635 11/03/23  0836 11/03/23  0607 11/02/23  0805 11/02/23  0659 11/01/23  0812 11/01/23  0701   NA  --   --   --   --   --  140  --  143  --  140  --  140  --  140  --  139   POTASSIUM  --   --   --   --   --  3.9  --  3.6  --  3.5  --  3.4  --  3.4  --  3.7   CHLORIDE  --   --   --   --   --  104  --  107  --  106  --  106  --  103  --  104   CO2  --   --   --   --   --  23  --  26  --  26  --  24  --  26  --  24   BUN  --   --   --   --   --  6.5  --  6.9  --  6.5  --  6.0  --  3.5*  --  3.1*   CR  --   --   --   --   --  0.67  --  0.66  --  0.74  --  0.83  --  0.79  --  0.72   GLC 97 124* 161* 140*   < > 90   < > 84   < > 82   < > 81   < > 95   < > 90   MAG  --   --   --   --   --  1.6*  --  1.6*  --   --   --   --   --  1.8  --  1.6*   PHOS  --   --    --   --   --   --   --  4.2  --   --   --   --   --   --   --   --     < > = values in this interval not displayed.     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  Pt has had a tough post-op course and has had high ileostomy outputs requiring outpatient IV fluids.  Now admitted on 10/30 with 3-4 days of fever, increased ileostomy outputs and continued J-pouch drainage.       COVID, RSV, Flu, Cdiff (ostomy specimen), enteric panel (ostomy specimen), legionella, strep pneumo negative.     - greatly appreciate Medicine and GI management  - strict in and outs of all ileostomy output, J-pouch output, urine  - psyllium to TID (order placed)  - Imodium to 4mg QID (order placed).  Imodium needs to be given 30 minutes before food (orders placed)  - start new Lomotil 1 capsule BID prior to meals (ordered)  - continue regular diet  - apprec WOCN  - apprec dietician recs.  Rec reg diet w/ supplements.  If not able to maintain adequate caloric intake, we would likely recommend feeding tube as next step  - is scheduled for ileostomy take down with Dr. Ram in Dec.   - agree with protonix BID  - discussed with medicine team    Dispo:  Due to continued higher ileo outputs, recommend discharge home with IVF three times per week.  We can reassess in clinic and discontinue as an outpatient if doing well.  Continue weekly labs (CRS ordered weekly outpatient labs)  Have placed CRS discharge recommendations and CRS will coordinate clinic follow up in 2-3 weeks.    Judi Sanabria PA-C ..................11/7/2023    7:48 AM  Colon and Rectal Surgery    Patient was seen and discussed with Dr. Varela    The above plan of care was performed and communicated to me by .     STEWART spent 35 minute face-to-face or coordinating care of Doretha Yu. Over 50% of our time on the unit was spent counseling the patient and/or coordinating care as documented in the assessment and plan.     [address] post op hospital visit

## 2023-11-07 NOTE — PROGRESS NOTES
Care Management Discharge Note    Discharge Date: 11/08/2023  Discharge Disposition: Home   Discharge Services:  Home Infusion  Discharge DME:  Ostomy supplies; mIVF  Discharge Transportation: friend of family available  Private pay costs discussed: Not applicable  Does the patient's insurance plan have a 3 day qualifying hospital stay waiver?  No  PAS Confirmation Code:  n/a  Patient/family educated on Medicare website which has current facility and service quality ratings:  n/a  Education Provided on the Discharge Plan:  yes  Persons Notified of Discharge Plans: pt; bedside RN; Charge RN; Primary medicine  Patient/Family in Agreement with the Plan:  yes    Handoff Referral Completed: Yes [facility]  [address] [place] [address]  Phone:[phone]  Fax: [phone]  ** Resumed Home Infusion Services - mIVF (x3/week)    Vs.   [facility]   [address]   [place], [place], [address]  Phone: [phone]   - OP Infusion   ** Hours: M-F 1549-0052 (No weekends or major holidays)    Additional Information:  Tentative discharge planning has encountered a problem with ongoing venous access for pt; PICC vs PIV - contraindications for PICC include prothrombin mutation causing hypercoagulability, alternatively a peripheral IV needs to be changed more frequently by skilled personnel, I.e. infusion center. Pt strongly prefers resumed home infusion, despite living ~10 miles from [facility] (open weekends) . New droplet precautions noted; Continue POC, RNCC to continue to follow for safe discharge planning.        Bernabe Duarte RN BSN  7C RNCC  Phone: [phone]  Pager: [phone]    For Weekend & Holiday on call RN Care Coordinator:  (Tasks: Home care, home infusion, medical equipment, transportation, IMM & LOPEZ forms, etc.)  [place] (0800 - 1630) Saturday and Sunday    Units: 5A, 5B, & 5C: [phone]    Units: 6B, 6C, 6D: [phone]     Units: 7A, 7B, 7C, 7D: [phone]    Units: 6A/ICU : [phone]    [facility] ([phone]) Saturday and Sunday    Units: 6 Med/Surg, 8A, 10 ICU, & Pediatric Units: [phone]    Units: 5 Ortho, 5Med/Surg &  ED: [phone]

## 2023-11-08 ENCOUNTER — APPOINTMENT (OUTPATIENT)
Dept: OCCUPATIONAL THERAPY | Facility: CLINIC | Age: 48
DRG: 385 | End: 2023-11-08
Payer: COMMERCIAL

## 2023-11-08 VITALS
SYSTOLIC BLOOD PRESSURE: 114 MMHG | DIASTOLIC BLOOD PRESSURE: 54 MMHG | RESPIRATION RATE: 18 BRPM | BODY MASS INDEX: 35.73 KG/M2 | HEART RATE: 88 BPM | OXYGEN SATURATION: 95 % | WEIGHT: 195.33 LBS | TEMPERATURE: 99.1 F

## 2023-11-08 LAB
ERYTHROCYTE [DISTWIDTH] IN BLOOD BY AUTOMATED COUNT: 16.5 % (ref 10–15)
GLUCOSE BLDC GLUCOMTR-MCNC: 113 MG/DL (ref 70–99)
GLUCOSE BLDC GLUCOMTR-MCNC: 116 MG/DL (ref 70–99)
GLUCOSE BLDC GLUCOMTR-MCNC: 96 MG/DL (ref 70–99)
HCT VFR BLD AUTO: 31.6 % (ref 35–47)
HGB BLD-MCNC: 10.1 G/DL (ref 11.7–15.7)
MAGNESIUM SERPL-MCNC: 1.9 MG/DL (ref 1.7–2.3)
MCH RBC QN AUTO: 27.4 PG (ref 26.5–33)
MCHC RBC AUTO-ENTMCNC: 32 G/DL (ref 31.5–36.5)
MCV RBC AUTO: 86 FL (ref 78–100)
PLATELET # BLD AUTO: 457 10E3/UL (ref 150–450)
RBC # BLD AUTO: 3.69 10E6/UL (ref 3.8–5.2)
WBC # BLD AUTO: 11.8 10E3/UL (ref 4–11)

## 2023-11-08 PROCEDURE — 250N000013 HC RX MED GY IP 250 OP 250 PS 637

## 2023-11-08 PROCEDURE — 250N000011 HC RX IP 250 OP 636: Mod: JZ | Performed by: STUDENT IN AN ORGANIZED HEALTH CARE EDUCATION/TRAINING PROGRAM

## 2023-11-08 PROCEDURE — 250N000013 HC RX MED GY IP 250 OP 250 PS 637: Performed by: PHYSICIAN ASSISTANT

## 2023-11-08 PROCEDURE — 250N000013 HC RX MED GY IP 250 OP 250 PS 637: Performed by: STUDENT IN AN ORGANIZED HEALTH CARE EDUCATION/TRAINING PROGRAM

## 2023-11-08 PROCEDURE — 83735 ASSAY OF MAGNESIUM: CPT | Performed by: STUDENT IN AN ORGANIZED HEALTH CARE EDUCATION/TRAINING PROGRAM

## 2023-11-08 PROCEDURE — 250N000011 HC RX IP 250 OP 636: Performed by: STUDENT IN AN ORGANIZED HEALTH CARE EDUCATION/TRAINING PROGRAM

## 2023-11-08 PROCEDURE — 85027 COMPLETE CBC AUTOMATED: CPT

## 2023-11-08 PROCEDURE — 250N000011 HC RX IP 250 OP 636

## 2023-11-08 PROCEDURE — 97535 SELF CARE MNGMENT TRAINING: CPT | Mod: GO

## 2023-11-08 PROCEDURE — 36415 COLL VENOUS BLD VENIPUNCTURE: CPT | Performed by: STUDENT IN AN ORGANIZED HEALTH CARE EDUCATION/TRAINING PROGRAM

## 2023-11-08 PROCEDURE — 99239 HOSP IP/OBS DSCHRG MGMT >30: CPT | Mod: GC | Performed by: STUDENT IN AN ORGANIZED HEALTH CARE EDUCATION/TRAINING PROGRAM

## 2023-11-08 RX ORDER — DIPHENHYDRAMINE HYDROCHLORIDE 50 MG/ML
50 INJECTION INTRAMUSCULAR; INTRAVENOUS
Status: CANCELLED
Start: 2023-11-11

## 2023-11-08 RX ORDER — METHYLPREDNISOLONE SODIUM SUCCINATE 125 MG/2ML
125 INJECTION, POWDER, LYOPHILIZED, FOR SOLUTION INTRAMUSCULAR; INTRAVENOUS
Status: CANCELLED
Start: 2023-11-11

## 2023-11-08 RX ORDER — GUAIFENESIN/DEXTROMETHORPHAN 100-10MG/5
10 SYRUP ORAL EVERY 4 HOURS PRN
Qty: 236 ML | Refills: 0 | Status: SHIPPED | OUTPATIENT
Start: 2023-11-08 | End: 2023-12-15

## 2023-11-08 RX ORDER — ALBUTEROL SULFATE 0.83 MG/ML
2.5 SOLUTION RESPIRATORY (INHALATION)
Status: CANCELLED | OUTPATIENT
Start: 2023-11-11

## 2023-11-08 RX ORDER — LOPERAMIDE HCL 2 MG
2 CAPSULE ORAL ONCE
Status: COMPLETED | OUTPATIENT
Start: 2023-11-08 | End: 2023-11-08

## 2023-11-08 RX ORDER — FAMOTIDINE 20 MG/1
20 TABLET, FILM COATED ORAL 2 TIMES DAILY
Qty: 20 TABLET | Refills: 0 | Status: SHIPPED | OUTPATIENT
Start: 2023-11-08 | End: 2023-11-18

## 2023-11-08 RX ORDER — ALBUTEROL SULFATE 90 UG/1
1-2 AEROSOL, METERED RESPIRATORY (INHALATION)
Status: CANCELLED
Start: 2023-11-11

## 2023-11-08 RX ORDER — TRIAMCINOLONE ACETONIDE 0.1 %
PASTE (GRAM) DENTAL 2 TIMES DAILY
Qty: 5 G | Refills: 0 | Status: ON HOLD | OUTPATIENT
Start: 2023-11-08 | End: 2023-12-29

## 2023-11-08 RX ORDER — LOPERAMIDE HCL 2 MG
4 CAPSULE ORAL
Qty: 240 CAPSULE | Refills: 0 | Status: SHIPPED | OUTPATIENT
Start: 2023-11-08 | End: 2023-12-08

## 2023-11-08 RX ORDER — EPINEPHRINE 1 MG/ML
0.3 INJECTION, SOLUTION, CONCENTRATE INTRAVENOUS EVERY 5 MIN PRN
Status: CANCELLED | OUTPATIENT
Start: 2023-11-11

## 2023-11-08 RX ORDER — ARIPIPRAZOLE 2 MG/1
2 TABLET ORAL DAILY
Qty: 30 TABLET | Refills: 0 | Status: SHIPPED | OUTPATIENT
Start: 2023-11-08 | End: 2023-12-14

## 2023-11-08 RX ORDER — HEPARIN SODIUM (PORCINE) LOCK FLUSH IV SOLN 100 UNIT/ML 100 UNIT/ML
5 SOLUTION INTRAVENOUS
Status: CANCELLED | OUTPATIENT
Start: 2023-11-11

## 2023-11-08 RX ORDER — SALIVA STIMULANT COMB. NO.3
2 SPRAY, NON-AEROSOL (ML) MUCOUS MEMBRANE 4 TIMES DAILY
Qty: 44.3 ML | Refills: 0 | Status: ON HOLD | OUTPATIENT
Start: 2023-11-08 | End: 2023-12-28

## 2023-11-08 RX ORDER — DIPHENHYDRAMINE HYDROCHLORIDE AND LIDOCAINE HYDROCHLORIDE AND ALUMINUM HYDROXIDE AND MAGNESIUM HYDRO
10 KIT EVERY 6 HOURS PRN
Qty: 237 ML | Refills: 0 | Status: ON HOLD | OUTPATIENT
Start: 2023-11-08 | End: 2023-12-28

## 2023-11-08 RX ORDER — HEPARIN SODIUM,PORCINE 10 UNIT/ML
5-20 VIAL (ML) INTRAVENOUS DAILY PRN
Status: CANCELLED | OUTPATIENT
Start: 2023-11-11

## 2023-11-08 RX ORDER — MAGNESIUM SULFATE HEPTAHYDRATE 40 MG/ML
2 INJECTION, SOLUTION INTRAVENOUS ONCE
Status: COMPLETED | OUTPATIENT
Start: 2023-11-08 | End: 2023-11-08

## 2023-11-08 RX ORDER — DIPHENOXYLATE HCL/ATROPINE 2.5-.025MG
1 TABLET ORAL
Qty: 60 TABLET | Refills: 0 | Status: SHIPPED | OUTPATIENT
Start: 2023-11-08 | End: 2023-12-08

## 2023-11-08 RX ORDER — MEPERIDINE HYDROCHLORIDE 25 MG/ML
25 INJECTION INTRAMUSCULAR; INTRAVENOUS; SUBCUTANEOUS EVERY 30 MIN PRN
Status: CANCELLED | OUTPATIENT
Start: 2023-11-11

## 2023-11-08 RX ORDER — MAGNESIUM OXIDE 400 MG/1
400 TABLET ORAL EVERY 4 HOURS
Status: COMPLETED | OUTPATIENT
Start: 2023-11-08 | End: 2023-11-08

## 2023-11-08 RX ADMIN — GUAIFENESIN 600 MG: 600 TABLET ORAL at 08:56

## 2023-11-08 RX ADMIN — PANTOPRAZOLE SODIUM 40 MG: 40 TABLET, DELAYED RELEASE ORAL at 08:56

## 2023-11-08 RX ADMIN — LOPERAMIDE HYDROCHLORIDE 4 MG: 2 CAPSULE ORAL at 11:20

## 2023-11-08 RX ADMIN — LOPERAMIDE HYDROCHLORIDE 4 MG: 2 CAPSULE ORAL at 08:57

## 2023-11-08 RX ADMIN — MAGNESIUM SULFATE IN WATER 2 G: 40 INJECTION, SOLUTION INTRAVENOUS at 12:23

## 2023-11-08 RX ADMIN — ONDANSETRON HYDROCHLORIDE 4 MG: 4 TABLET, FILM COATED ORAL at 08:11

## 2023-11-08 RX ADMIN — ENOXAPARIN SODIUM 40 MG: 40 INJECTION SUBCUTANEOUS at 13:52

## 2023-11-08 RX ADMIN — ARIPIPRAZOLE 2 MG: 2 TABLET ORAL at 08:56

## 2023-11-08 RX ADMIN — HYDROMORPHONE HYDROCHLORIDE 1 MG: 2 TABLET ORAL at 08:11

## 2023-11-08 RX ADMIN — DIPHENOXYLATE HYDROCHLORIDE AND ATROPINE SULFATE 1 TABLET: 2.5; .025 TABLET ORAL at 08:56

## 2023-11-08 RX ADMIN — PSYLLIUM HUSK 1 PACKET: 3.4 POWDER ORAL at 08:56

## 2023-11-08 RX ADMIN — DIPHENOXYLATE HYDROCHLORIDE AND ATROPINE SULFATE 1 TABLET: 2.5; .025 TABLET ORAL at 15:23

## 2023-11-08 RX ADMIN — LOPERAMIDE HYDROCHLORIDE 4 MG: 2 CAPSULE ORAL at 15:21

## 2023-11-08 RX ADMIN — VENLAFAXINE 150 MG: 75 TABLET ORAL at 08:56

## 2023-11-08 RX ADMIN — PSYLLIUM HUSK 1 PACKET: 3.4 POWDER ORAL at 13:52

## 2023-11-08 ASSESSMENT — ACTIVITIES OF DAILY LIVING (ADL)
ADLS_ACUITY_SCORE: 20

## 2023-11-08 NOTE — PROGRESS NOTES
Colorectal Surgery Progress Note  [facility]    Subjective: Patient reports thickening of ileostomy output. Has some abdominal pain this morning.     Vitals:  Vitals:    11/07/23 0906 11/07/23 1455 11/07/23 2159 11/08/23 0543   BP:  121/76 121/65 128/62   BP Location:  Left leg Left leg Left leg   Patient Position:       Cuff Size:       Pulse:  85 87 82   Resp:  16 18 16   Temp:  98.2  F (36.8  C) 98.8  F (37.1  C) 98.7  F (37.1  C)   TempSrc:  Oral Oral Oral   SpO2:  94% 96% 92%   Weight: 88.6 kg (195 lb 5.2 oz)        I/O:  I/O last 3 completed shifts:  In: -   Out: 1000 [Stool:1000]    Physical Exam:  Gen: AAOx3, NAD  Pulm: Non-labored breathing.   Abd: Soft, non-distended, non-tender, nonperitonitic    Stoma pink and viable with mixed applesauce consistency of output  Ext:  Warm and well-perfused    BMP  Recent Labs   Lab 11/08/23  0132 11/07/23  2220 11/07/23  1740 11/07/23  1200 11/07/23  0843 11/07/23  0703 11/06/23  0757 11/06/23  0634 11/05/23  0820 11/05/23  0724 11/04/23  0854 11/04/23  0635 11/03/23  0836 11/03/23  0607 11/02/23  0805 11/02/23  0659   NA  --   --   --   --   --   --   --  140  --  143  --  140  --  140  --  140   POTASSIUM  --   --   --   --   --   --   --  3.9  --  3.6  --  3.5  --  3.4  --  3.4   CHLORIDE  --   --   --   --   --   --   --  104  --  107  --  106  --  106  --  103   CO2  --   --   --   --   --   --   --  23  --  26  --  26  --  24  --  26   BUN  --   --   --   --   --   --   --  6.5  --  6.9  --  6.5  --  6.0  --  3.5*   CR  --   --   --   --   --   --   --  0.67  --  0.66  --  0.74  --  0.83  --  0.79   * 123* 150* 134*   < >  --    < > 90   < > 84   < > 82   < > 81   < > 95   MAG  --   --   --   --   --  1.8  --  1.6*  --  1.6*  --   --   --   --   --  1.8   PHOS  --   --   --   --   --   --   --   --   --  4.2  --   --   --   --   --   --     < > = values in this interval not displayed.     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  Pt has had a tough post-op course and has had high ileostomy outputs requiring outpatient IV fluids.  Now admitted on 10/30 with 3-4 days of fever, increased ileostomy outputs and continued J-pouch drainage. Hemodynamically stable today. Decreased ileostomy output at 1L over last 24 hours. Output with thicker consistency.     COVID, RSV, Flu, Cdiff (ostomy specimen), enteric panel (ostomy specimen), legionella, strep pneumo negative.     - no change to plan today   - greatly appreciate Medicine and GI management  - strict in and outs of all ileostomy output, J-pouch output, urine  - psyllium to TID (order placed)  - Imodium to 4mg QID (order placed).  Imodium needs to be given 30 minutes before food (orders placed)  - Lomotil 1 capsule BID prior to meals (ordered)  - continue regular diet  - apprec WOCN  - apprec dietician recs.  Rec reg diet w/ supplements.  If not able to maintain adequate caloric intake, we would likely recommend feeding tube as next step  - is scheduled for ileostomy take down with Dr. Ram in Dec.   - agree with protonix BID  - will discuss with medicine team    Dispo:  Due to continued higher ileo outputs, recommend discharge home with IVF three times per week.  We can reassess in clinic and discontinue as an outpatient if doing well.  Continue weekly labs (CRS ordered weekly outpatient labs)  Have placed CRS discharge recommendations and CRS will coordinate clinic follow up in 2-3 weeks.    Seen and discussed w/CRS fellow Dr. Varela. Patient to be discussed with staff,  Dr. Ram.    Hayde Marshall,   General Surgery Resident, PGY-1

## 2023-11-08 NOTE — DISCHARGE SUMMARY
Discharge to: Home  Transportation: Family  Time: 1549  Prescriptions: Picked up at discharge pharmacy  Belongings: Sent with patient  Access: De-accessed  Care plan and education discontinued: Yes  Paperwork: Reviewed and sent with patient

## 2023-11-08 NOTE — PROGRESS NOTES
CLINICAL NUTRITION SERVICES - REASSESSMENT NOTE     Nutrition Prescription    RECOMMENDATIONS FOR MDs/PROVIDERS TO ORDER:  Low fiber diet as tolerated     Malnutrition Status:    Moderate malnutrition in the context of chronic condition     Recommendations already ordered by Registered Dietitian (RD):  No changes to oral nutrition supplements     Future/Additional Recommendations:  Patient is discharging today. No rec for follow ups     EVALUATION OF THE PROGRESS TOWARD GOALS   Diet:   Regular ( gluten free diet) + [employer] standard oral supplements with meals + 1 benetrol + 1 bene protein + 1 greek yogurt with each meals     Intake:   - Consuming 25-75% of meals with fair appetite per nursing record.     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      - Pt has had a tough post-op course and has had high ileostomy outputs requiring outpatient IV fluids.      Admitted on 10/30 with 3-4 days of fever, increased ileostomy outputs and continued J-pouch drainage. Decreased ileostomy output at 1L over last 24 hours. Output with thicker consistency.   - Strict in and outs of all ileostomy output, J-pouch output, urine  - On Psyllium to TID (order placed), I36  - Imodium to 4mg QID (order placed).  Imodium needs to be given 30 minutes before food (orders placed)  - Lomotil 1 capsule BID prior to meals   - Plan scheduled for ileostomy take down with Dr. Ram in Dec.      Wt trend:  89.9 kg (198 lb 4.8 oz) kg standing wt on 11/3/23  88.6 kg (195 lb 5.2 oz) bed scale on     Labs  BMP unremarkable   CRP: 105 (H), B    GI/BM:  Ileostomy output: 700 ml yesterday, 1225 ml on       MALNUTRITION  % Intake: Decreased intake does not meet criteria  % Weight Loss: > 10% in 6 months (severe malnutrition)   Subcutaneous Fat Loss : Facial region:  mild    Muscle Loss: Temporal:  mild and Thoracic region (clavicle, acromium bone, deltoid, trapezius, pectoral):  mild   Fluid Accumulation/Edema: None noted  Malnutrition Diagnosis: Moderate malnutrition in the context of chronic condition     Previous Goals   Total avg nutritional intake to meet a minimum of 30 kcal/kg and 1.2 g PRO/kg daily (per dosing wt 58.7 kg).   Evaluation: Likely met     Previous Nutrition Diagnosis  Altered GI function related to multiple GI surgeries including reation of ileal J-pouch with ileoanal anastomosis, and diverting loop ileostomy as evidenced by high ostomy output, weight loss, patient report.      Evaluation: No change      CURRENT NUTRITION DIAGNOSIS  Unintended weight loss related to malabsorption associated with high ileostomy output and J-pouch leakage was hindering patient ability to tolerate PO  as evidenced by >1 liter average daily stool, Lyte abnormalities and wt loss on admit with now improve in GI output and oral intake      INTERVENTIONS  Implementation  Medical food supplement therapy    Goals  Patient to consume % of nutritionally adequate meal trays TID, or the equivalent with supplements/snacks.    Monitoring/Evaluation  Progress toward goals will be monitored and evaluated per protocol.    Danya Rae RD/JEWEL  7C [phone] RD pager: [phone]  Weekend/Holiday RD pager: [phone]

## 2023-11-08 NOTE — PLAN OF CARE
Goal Outcome Evaluation:      Plan of Care Reviewed With: patient    Overall Patient Progress: improvingOverall Patient Progress: improving    Outcome Evaluation: Pt. AOx4 VSS on RA. PO dilaudid given x1. Pt. slept throughout shift. no significant events on shift.

## 2023-11-08 NOTE — PROGRESS NOTES
Care Management Discharge Note    Discharge Date: 11/08/2023  Discharge Disposition: Home   Discharge Services:  Infusion - OP   Discharge DME:  Ileostomy supplies; lymphedema wraps  Discharge Transportation: family or friend; car, drives self  Private pay costs discussed: Not applicable  Does the patient's insurance plan have a 3 day qualifying hospital stay waiver?  No  PAS Confirmation Code:  n/a  Patient/family educated on Medicare website which has current facility and service quality ratings:  n/a  Education Provided on the Discharge Plan:  yes  Persons Notified of Discharge Plans: pt; Primary medicine team; Charge RN  Patient/Family in Agreement with the Plan:  yes    Handoff Referral Completed: Yes [facility]   [address]   [place], [place], [address]  Phone: [phone]  Fax: [phone]   ** Hours: M-F 6729-6445    - OP Infusion; 1L NS or LR x3/week    SOC:   11/10 - Friday @ 1330,   11/13 - Monday @ 0800;   11/15 - Wednesday @ 1400;   11/17 - Friday @ 0800    Additional Information:  Pt medically ready to discharge today, as updated by Medicine team's in-person rounding this morning. Consideration of IV hydration needs and use of peripheral IV access, OP setting is necessary. Infusion Clinic in [place], [place] contacted to assist in OP services; Charge RN looking to arrange availabilities. Medicine team informed, via [employer], of needed Therapy Plan for infusion(s). Transportation confirmed via family ~1430. RNCC to conclude following upon safe departure from floor and facility.        Bernabe Duarte RN BSN  7C RNCC  Phone: [phone]  Pager: [phone]    For Weekend & Holiday on call RN Care Coordinator:  (Tasks: Home care, home infusion, medical equipment, transportation, IMM & LOPEZ forms, etc.)  [place] (0800 - 1630) Saturday and Sunday    Units: 5A, 5B, & 5C: [phone]    Units: 6B, 6C, 6D: [phone]    Units: 7A, 7B, 7C, 7D: [phone]     Units: 6A/ICU : [phone]    [facility] ([phone]) Saturday and Sunday    Units: 6 Med/Surg, 8A, 10 ICU, & Pediatric Units: [phone]    Units: 5 Ortho, 5Med/Surg &  ED: [phone]

## 2023-11-08 NOTE — PLAN OF CARE
/62 (BP Location: Left leg)   Pulse 82   Temp 98.7  F (37.1  C) (Oral)   Resp 16   Wt 88.6 kg (195 lb 5.2 oz)   SpO2 92%   BMI 35.73 kg/m      Care from: 0700 - 1530    VS & Pain: VSS. Endorsing abdominal pain and given prn po dilaudid x1 with good effect.  Neuro: A&Ox4.  Respiratory: Vitally stable on RA.  GI/: Ileostomy WDL with 1000cc output this shift. Voiding WDL.  Nutrition: Gluten free diet - adequate appetite and intake.  Skin: No new skin concerns this shift.  Lines: R leg piv SL.   Activity: Up independently in room  Events this shift: Mag replaced this shift via IV.     Plan: Plan to discharge this afternoon to home.    Goal Outcome Evaluation:      Plan of Care Reviewed With: patient    Overall Patient Progress: improvingOverall Patient Progress: improving    Outcome Evaluation: Preparing to discharge this afternoon

## 2023-11-09 ENCOUNTER — PATIENT OUTREACH (OUTPATIENT)
Dept: CARE COORDINATION | Facility: CLINIC | Age: 48
End: 2023-11-09
Payer: COMMERCIAL

## 2023-11-09 ASSESSMENT — ACTIVITIES OF DAILY LIVING (ADL): DEPENDENT_IADLS:: INDEPENDENT

## 2023-11-09 NOTE — PROGRESS NOTES
Clinic Care Coordination Contact  Clinic Care Coordination Contact  OUTREACH    Referral Information:  Referral Source: IP Handoff    Primary Diagnosis: GI Disorders    Chief Complaint   Patient presents with    Clinic Care Coordination - Post Hospital     Clinic Care Coordination RN         Universal Utilization: 10/30/2023 - 11/8/2023 (9 days)  [facility]     Madeline Easton MD  Last attending  Treatment team High output ileostomy (H)  Principal problem   Discharge Diagnoses  Nivolumab induced UC  Increased ostomy output  Severe protein calorie malnutrition   Pneumonia   Hypomagnesemia   Aphthous ulcers   Hyponatremia   Chronic Transaminitis   Depression  Anxiety  DM2   Hypothyroidism   Normocytic Anemia   STORMY   Chronic Pain   Prothrombin Mutation   Melanoma history   Clinic Utilization  Difficulty keeping appointments:: No  Compliance Concerns: No  No-Show Concerns: No  No PCP office visit in Past Year: No  Utilization      No Show Count (past year)  3             ED Visits  9             Hospital Admissions  3                    Current as of: 11/9/2023 10:21 AM                Clinical Concerns:  Current Medical Concerns:  Discussed the importance of staying hydrated and 3 protein drinks a day  Reviewed patient is to contact Colorectal RN if Ostomy output is less than 500 ml or more than 1000 cc   Patient reports output measures 650 today   Patient continues IV hydration 3 times a week and outpatient wound clinic visits     Current Behavioral Concerns: Tired   Patient has a cough from previous Pneumonia diagnosis   Education Provided to patient: Reviewed discharge instructions from Colorectal surgeon    Pain  Pain (GOAL):: No  Health Maintenance Reviewed: Not assessed  Clinical Pathway: None    Medication Management:  Medication review status: Medications reviewed.  Changes noted per patient report.       Functional Status:  Dependent ADLs::  Independent  Dependent IADLs:: Independent  Bed or wheelchair confined:: No  Mobility Status: Independent    Living Situation:  Current living arrangement:: I live in a private home with family    Lifestyle & Psychosocial Needs:    Social Determinants of Health     Food Insecurity: No Food Insecurity (6/28/2021)    Hunger Vital Sign     Worried About Running Out of Food in the Last Year: Never true     Ran Out of Food in the Last Year: Never true   Depression: Not at risk (8/22/2023)    PHQ-2     PHQ-2 Score: 0   Recent Concern: Depression - At risk (7/11/2023)    PHQ-2     PHQ-2 Score: 6   Housing Stability: Not on file   Tobacco Use: Medium Risk (10/30/2023)    Patient History     Smoking Tobacco Use: Former     Smokeless Tobacco Use: Never     Passive Exposure: Not on file   Financial Resource Strain: High Risk (6/28/2021)    Overall Financial Resource Strain (CARDIA)     Difficulty of Paying Living Expenses: Very hard   Alcohol Use: Not on file   Transportation Needs: No Transportation Needs (6/28/2021)    PRAPARE - Transportation     Lack of Transportation (Medical): No     Lack of Transportation (Non-Medical): No   Physical Activity: Not on file   Interpersonal Safety: Unknown (6/25/2021)    Humiliation, Afraid, Rape, and Kick questionnaire     Fear of Current or Ex-Partner: Not asked     Emotionally Abused: Not asked     Physically Abused: Not asked     Sexually Abused: Not asked   Stress: Stress Concern Present (6/25/2021)    [employer] - Occupational Stress Questionnaire     Feeling of Stress : Very much   Social Connections: Unknown (6/25/2021)    Social Connection and Isolation Panel [NHANES]     Frequency of Communication with Friends and Family: Not asked     Frequency of Social Gatherings with Friends and Family: Not asked     Attends Sikh Services: Not asked     Active Member of Clubs or Organizations: Not asked     Attends Club or Organization Meetings: Not asked      Marital Status:      Diet:: Regular  Inadequate nutrition (GOAL):: No  Tube Feeding: No  Inadequate activity/exercise (GOAL):: No  Significant changes in sleep pattern (GOAL): No        Confucianist or spiritual beliefs that impact treatment:: No  Mental health DX:: Yes  Mental health DX how managed:: Medication  Mental health management concern (GOAL):: No  Chemical Dependency Status: No Current Concerns  Informal Support system:: Children             Resources and Interventions:  Current Resources:      Community Resources: None  Supplies Currently Used at Home: None  Equipment Currently Used at Home: none  Employment Status: employed full-time         Advance Care Plan/Directive  Advanced Care Plans/Directives on file:: Yes  Type Advanced Care Plans/Directives: Advanced Directive - On File    Referrals Placed: None         Care Plan:  Care Plan: General       Problem: HP GENERAL PROBLEM       Goal: I will have a good plan for wound care in the next week       Start Date: 9/15/2023 Expected End Date: 11/30/2023    This Visit's Progress: 70% Recent Progress: 60%    Note:     Barriers: Anxiety  Unable to find home care services   Strengths: lives with daughter   Patient expressed understanding of goal: Yes   Action steps to achieve this goal:  1. I will have a Community Paramedic visit Not needed   2. Care coordinator will call surgeons office and ostomy RN for supplies   3. I will keep wound clinic appointments                                 Patient/Caregiver understanding: Patient expresses good understanding of discharge instructions     Outreach Frequency: weekly, more frequently as needed  Future Appointments                In 4 days ROOM 5 [place] CANCER INF[place] NURSE [facility]    In 6 days [place] Huffman RN [facility] Wound Ostomy Clinic [facility]    In 6 days ROOM 1 [facility] CANCER INFUSION NURSE [facility]  [place]    In 1 week ROOM 10 [place] CANCER INFUSION NURSE M [facility] Cancer Center [facility]    In 1 week ROOM 8 [place] CANC[place] INFUSION NURSE M [facility] Cancer [facility] [place]    In 1 week ROOM 10 [place] CANCER INFUSION NURSE M [facility] Cancer [facility], [place]    In 2 weeks ROOM 11 [place] CANC[place] INFUSION NURSE M [facility] Cancer [facility] [place]    In 2 weeks ROOM 10 [place] CANCER INFUSION NURSE M [facility] Cancer [facility] [place]    In 2 weeks ROOM 9 [place] CANCER INFUSION NURSE M [facility] Cancer [facility], [place]    In 3 weeks ROOM 11 [place] CANCER INFUSION NURSE M [facility] Cancer [facility]    In 1 month Quinton Ram MD [facility]    In 1 month [facility] GIGU RAD; UC[facility]XR2 [facility] Imaging [facility]    In 1 month Walter Valente MD [facility]    In 1 month Kailee Keenan APRN CNP [facility] Preoperative Assessment Center [facility]    In 1 month UC LAB [facility] Lab [facility]    In 2 months Mariela Crandall PA-C [facility] Colon and Rectal Surgery Clinic [facility]    In 3 months Quinton Ram MD [facility] Colon and Rectal Surgery Clinic [facility]            Plan:  Patient will follow discharge instructions and will call Colorectal RN with concerns or questions    CC RN will follow up in 3-5  business days       [facility]   Gisel Marte RN, Care Coordinator   [facility]'s   E-mail [email or web address]   [phone]

## 2023-11-10 ENCOUNTER — VIRTUAL VISIT (OUTPATIENT)
Dept: FAMILY MEDICINE | Facility: CLINIC | Age: 48
End: 2023-11-10
Payer: COMMERCIAL

## 2023-11-10 ENCOUNTER — PATIENT OUTREACH (OUTPATIENT)
Dept: SURGERY | Facility: CLINIC | Age: 48
End: 2023-11-10

## 2023-11-10 ENCOUNTER — INFUSION THERAPY VISIT (OUTPATIENT)
Dept: INFUSION THERAPY | Facility: CLINIC | Age: 48
End: 2023-11-10
Payer: COMMERCIAL

## 2023-11-10 ENCOUNTER — E-VISIT (OUTPATIENT)
Dept: URGENT CARE | Facility: CLINIC | Age: 48
End: 2023-11-10
Payer: COMMERCIAL

## 2023-11-10 ENCOUNTER — LAB (OUTPATIENT)
Dept: LAB | Facility: CLINIC | Age: 48
End: 2023-11-10
Payer: COMMERCIAL

## 2023-11-10 DIAGNOSIS — B96.89 BV (BACTERIAL VAGINOSIS): ICD-10-CM

## 2023-11-10 DIAGNOSIS — B37.31 VAGINA, CANDIDIASIS: ICD-10-CM

## 2023-11-10 DIAGNOSIS — B37.31 YEAST INFECTION OF THE VAGINA: ICD-10-CM

## 2023-11-10 DIAGNOSIS — Z93.2 HIGH OUTPUT ILEOSTOMY (H): Primary | ICD-10-CM

## 2023-11-10 DIAGNOSIS — R30.0 DYSURIA: Primary | ICD-10-CM

## 2023-11-10 DIAGNOSIS — N76.0 BV (BACTERIAL VAGINOSIS): ICD-10-CM

## 2023-11-10 DIAGNOSIS — B37.31 YEAST INFECTION OF THE VAGINA: Primary | ICD-10-CM

## 2023-11-10 DIAGNOSIS — R30.0 DYSURIA: ICD-10-CM

## 2023-11-10 DIAGNOSIS — K51.919 ULCERATIVE COLITIS WITH COMPLICATION, UNSPECIFIED LOCATION (H): ICD-10-CM

## 2023-11-10 DIAGNOSIS — R19.8 HIGH OUTPUT ILEOSTOMY (H): Primary | ICD-10-CM

## 2023-11-10 LAB
ALBUMIN UR-MCNC: NEGATIVE MG/DL
APPEARANCE UR: CLEAR
BILIRUB UR QL STRIP: NEGATIVE
CLUE CELLS: PRESENT
COLOR UR AUTO: NORMAL
GLUCOSE UR STRIP-MCNC: NEGATIVE MG/DL
HGB UR QL STRIP: NEGATIVE
KETONES UR STRIP-MCNC: NEGATIVE MG/DL
LEUKOCYTE ESTERASE UR QL STRIP: NEGATIVE
NITRATE UR QL: NEGATIVE
PH UR STRIP: 5 [PH] (ref 5–7)
SP GR UR STRIP: 1.01 (ref 1–1.03)
TRICHOMONAS, WET PREP: ABNORMAL
UROBILINOGEN UR STRIP-MCNC: NORMAL MG/DL
WBC'S/HIGH POWER FIELD, WET PREP: ABNORMAL
YEAST, WET PREP: PRESENT

## 2023-11-10 PROCEDURE — 258N000003 HC RX IP 258 OP 636

## 2023-11-10 PROCEDURE — 96360 HYDRATION IV INFUSION INIT: CPT

## 2023-11-10 PROCEDURE — 99207 PR NON-BILLABLE SERV PER CHARTING: CPT | Performed by: PHYSICIAN ASSISTANT

## 2023-11-10 PROCEDURE — 96361 HYDRATE IV INFUSION ADD-ON: CPT

## 2023-11-10 PROCEDURE — 81003 URINALYSIS AUTO W/O SCOPE: CPT | Performed by: PHYSICIAN ASSISTANT

## 2023-11-10 PROCEDURE — 99441 PR PHYSICIAN TELEPHONE EVALUATION 5-10 MIN: CPT | Mod: 93 | Performed by: INTERNAL MEDICINE

## 2023-11-10 PROCEDURE — 87210 SMEAR WET MOUNT SALINE/INK: CPT

## 2023-11-10 RX ORDER — METRONIDAZOLE 7.5 MG/G
1 GEL VAGINAL DAILY
Qty: 35 G | Refills: 0 | Status: SHIPPED | OUTPATIENT
Start: 2023-11-10 | End: 2023-11-17

## 2023-11-10 RX ORDER — HEPARIN SODIUM,PORCINE 10 UNIT/ML
5-20 VIAL (ML) INTRAVENOUS DAILY PRN
Status: CANCELLED | OUTPATIENT
Start: 2023-11-10

## 2023-11-10 RX ORDER — METHYLPREDNISOLONE SODIUM SUCCINATE 125 MG/2ML
125 INJECTION, POWDER, LYOPHILIZED, FOR SOLUTION INTRAMUSCULAR; INTRAVENOUS
Status: CANCELLED
Start: 2023-11-10

## 2023-11-10 RX ORDER — ALBUTEROL SULFATE 90 UG/1
1-2 AEROSOL, METERED RESPIRATORY (INHALATION)
Status: CANCELLED
Start: 2023-11-10

## 2023-11-10 RX ORDER — ALBUTEROL SULFATE 0.83 MG/ML
2.5 SOLUTION RESPIRATORY (INHALATION)
Status: CANCELLED | OUTPATIENT
Start: 2023-11-10

## 2023-11-10 RX ORDER — FLUCONAZOLE 150 MG/1
150 TABLET ORAL ONCE
Qty: 1 TABLET | Refills: 0 | Status: SHIPPED | OUTPATIENT
Start: 2023-11-10 | End: 2023-11-10

## 2023-11-10 RX ORDER — HEPARIN SODIUM (PORCINE) LOCK FLUSH IV SOLN 100 UNIT/ML 100 UNIT/ML
5 SOLUTION INTRAVENOUS
Status: CANCELLED | OUTPATIENT
Start: 2023-11-10

## 2023-11-10 RX ORDER — DIPHENHYDRAMINE HYDROCHLORIDE 50 MG/ML
50 INJECTION INTRAMUSCULAR; INTRAVENOUS
Status: CANCELLED
Start: 2023-11-10

## 2023-11-10 RX ORDER — MEPERIDINE HYDROCHLORIDE 25 MG/ML
25 INJECTION INTRAMUSCULAR; INTRAVENOUS; SUBCUTANEOUS EVERY 30 MIN PRN
Status: CANCELLED | OUTPATIENT
Start: 2023-11-10

## 2023-11-10 RX ORDER — EPINEPHRINE 1 MG/ML
0.3 INJECTION, SOLUTION, CONCENTRATE INTRAVENOUS EVERY 5 MIN PRN
Status: CANCELLED | OUTPATIENT
Start: 2023-11-10

## 2023-11-10 RX ADMIN — SODIUM CHLORIDE 1000 ML: 9 INJECTION, SOLUTION INTRAVENOUS at 11:26

## 2023-11-10 ASSESSMENT — PAIN SCALES - GENERAL: PAINLEVEL: SEVERE PAIN (6)

## 2023-11-10 NOTE — PROGRESS NOTES
Post Op Note     Called to check on patient postoperatively after hospital discharge.       Admitted for high outputs/pneumonia   Admitted 10/30 and discharged on 11/8.    Patient is eating and drinking normally. Patient is on a low fiber diet.  Encouraged patient to drink 8-10 glasses of water a day.   Patient has been recording daily ileostomy outputs. Patient has recorded outputs these were; 1400cc-1600cc daily. Advised patient to contact the clinic if outputs are more than 1000 mL daily for two consecutive days or more than 2000 mL in one day or less than 500 mL for two consecutive days.  Patient reports taking medication for bowel function. Imodium 4mg QID, metamucil powder TID, lomotil 1 tab BID. She is also on outpatient infusions. MWF. She is still having high outputs so increased lomotil to 1 tablet QID.   Patient denies pouching difficulties  If any pouching difficulties reported, please contact [facility] nurse  to schedule appointment.   Patient is voiding normally and urine is light in color.  Patient is not set up with home care.   Patient Denies nausea and vomiting.  Patient denies fevers of chills   Patient Denies needing any forms completed.   Follow up with Dr. Quinton Ram on 12/12.   She would like to move surgery. Sent a message to Cathy.   She needs a follow up with GI so sent a message to GI    Encouraged the patient to contact the clinic in the meantime with any fevers, chills, nausea, vomiting, increased colostomy output, no colostomy output, dizziness, lightheadedness, uncontrolled pain, changes to the incisions, or with any questions or concerns.    Patient's questions were answered to their stated satisfaction and they are in agreement with this plan.    TEJINDER De Luna [phone]  Colon & Rectal Surgery Clinic  [facility]

## 2023-11-10 NOTE — PATIENT INSTRUCTIONS
For yeast, start fluconazole 150 mg x 1 dose..  for bacterial vaginal infection, use metronidazole cream at bedtime x 1 week.  Avoid intercourse until symptoms have resolved.

## 2023-11-10 NOTE — PATIENT INSTRUCTIONS
Dear Doretha Yu,     After reviewing your responses, I would like you to come in for a urine test to make sure we treat you correctly. This urine test is to evaluate you for a possible urinary tract infection, and should be scheduled for today or tomorrow. Schedule a Lab Only appointment here.     Lab appointments are not available at most locations on the weekends. If no Lab Only appointment is available, you should be seen in any of our convenient Walk-in or Urgent Care Centers, which can be found on our website here.     You will receive instructions with your results in [place] once they are available.     If your symptoms worsen, you develop pain in your back or stomach, develop fevers, or are not improving in 5 days, please contact your primary care provider for an appointment or visit a Walk-in or Urgent Care Center to be seen.     Thanks again for choosing us as your health care partner,     Floridalma Aggarwal PA-C

## 2023-11-10 NOTE — PROGRESS NOTES
Infusion Nursing Note:  Doretha Yu presents today for IVF.    Patient seen by provider today: No   present during visit today: Not Applicable.    Note: N/A.    Intravenous Access:  Peripheral IV placed.    Treatment Conditions:  Not Applicable.    Post Infusion Assessment:  Patient tolerated infusion without incident.  Blood return noted pre and post infusion.  Site patent and intact, free from redness, edema or discomfort.  No evidence of extravasations.  Access discontinued per protocol.     Discharge Plan:   Discharge instructions reviewed with: Patient.  Patient and/or family verbalized understanding of discharge instructions and all questions answered.  AVS to patient via [employer].  Patient will return 11/13/2023 for weekly lab draw and IVFs for next appointment.   Patient discharged in stable condition accompanied by: self.  Departure Mode: Ambulatory.    Chantel Cook RN

## 2023-11-10 NOTE — PROGRESS NOTES
Doretha is a 48 year old who is being evaluated via a billable telephone visit.      What phone number would you like to be contacted at? [phone]   How would you like to obtain your AVS? Eli    Distant Location (provider location):  On-site    Assessment & Plan   Problem List Items Addressed This Visit       Diabetes mellitus, iatrogenic (H) (Chronic)     Other Visit Diagnoses       Yeast infection of the vagina    -  Primary    Relevant Orders    Wet prep - lab collect (Completed)    Visit for screening mammogram        BV (bacterial vaginosis)        Relevant Medications    metroNIDAZOLE (METROGEL) 0.75 % vaginal gel    Vagina, candidiasis        Relevant Medications    fluconazole (DIFLUCAN) 150 MG tablet                 Chantelle Gil [facility]    Subjective   Doretha is a 48 year old, presenting for the following health issues:  Vaginal Problem        11/10/2023    10:20 AM   Additional Questions   Roomed by rafa moss   Accompanied by self         11/10/2023    10:20 AM   Patient Reported Additional Medications   Patient reports taking the following new medications none       HPI     Chief Complaint   Patient presents with    Vaginal Problem         Vaginal Symptoms  Onset/Duration: 2 days  Description:  Vaginal Discharge: curd-like   Itching (Pruritis): YES  Burning sensation:  YES  Odor: No  Accompanying Signs & Symptoms:  Urinary symptoms: YES  Abdominal pain: YES  Fever: No  History:   Sexually active: No  New Partner: No  Possibility of Pregnancy:  No  Recent antibiotic use: YES - zosyn, doxycyline for pneumonia   Previous vaginitis issues: No  Precipitating or alleviating factors: None  Therapies tried and outcome: none  --has had BV and yeast in the remote past      Current Outpatient Medications   Medication Sig Dispense Refill    acetaminophen (TYLENOL) 500 MG tablet Take 1,000 mg by mouth every 6 hours as needed for mild pain      albuterol (PROAIR HFA/PROVENTIL  HFA/VENTOLIN HFA) 108 (90 Base) MCG/ACT inhaler Inhale 2 puffs into the lungs every 4 hours as needed for shortness of breath / dyspnea 18 g 1    ARIPiprazole (ABILIFY) 2 MG tablet Take 1 tablet (2 mg) by mouth daily for 30 days 30 tablet 0    artificial saliva (BIOTENE MT) SOLN solution Swish and spit 2 mLs (2 sprays) in mouth 4 times daily 44.3 mL 0    benzocaine-menthol (CHLORASEPTIC MAX) 15-10 MG lozenge Place 1 lozenge inside cheek every hour as needed 15 lozenge 0    Cyanocobalamin (B-12 PO) Take 1 tablet by mouth daily      diphenoxylate-atropine (LOMOTIL) 2.5-0.025 MG tablet Take 1 tablet by mouth 2 times daily (before meals) for 30 days 60 tablet 0    famotidine (PEPCID) 20 MG tablet Take 1 tablet (20 mg) by mouth 2 times daily for 10 days 20 tablet 0    famotidine (PEPCID) 20 MG tablet Take 1 tablet (20 mg) by mouth 2 times daily 60 tablet 0    guaiFENesin-dextromethorphan (ROBITUSSIN DM) 100-10 MG/5ML syrup Take 10 mLs by mouth every 4 hours as needed for cough 236 mL 0    hydrOXYzine (ATARAX) 25 MG tablet Take 1 tablet (25 mg) by mouth 2 times daily 60 tablet 1    loperamide (IMODIUM) 2 MG capsule Take 2 capsules (4 mg) by mouth 4 times daily (before meals and nightly) for 30 days 240 capsule 0    magic mouthwash suspension, diphenhydrAMINE, lidocaine, aluminum-magnesium & simethicone, (FIRST-MOUTHWASH BLM) compounding kit Swish and swallow 10 mLs in mouth every 6 hours as needed for mouth sores 237 mL 0    medical cannabis (Patient's own supply) See Admin Instructions (The purpose of this order is to document that the patient reports taking medical cannabis.  This is not a prescription, and is not used to certify that the patient has a qualifying medical condition.)      metFORMIN (GLUCOPHAGE) 500 MG tablet TAKE 2 TABLETS BY MOUTH 2 TIMES DAILY WITH MEALS (Patient taking differently: Take 500 mg by mouth 2 times daily (with meals) TAKE 2 TABLETS BY MOUTH 2 TIMES DAILY WITH MEALS) 360 tablet 1     methocarbamol (ROBAXIN) 750 MG tablet Take 1 tablet (750 mg) by mouth 4 times daily as needed for muscle spasms 40 tablet 0    ondansetron (ZOFRAN ODT) 4 MG ODT tab Take 1 tablet (4 mg) by mouth every 8 hours as needed for nausea or vomiting 10 tablet 1    Ostomy Supplies MISC 20 each daily 20 each 11    psyllium (METAMUCIL/KONSYL) Packet Take 1 packet by mouth 3 times daily for 30 days 90 packet 0    triamcinolone (KENALOG) 0.1 % paste Take by mouth 2 times daily 5 g 0    venlafaxine (EFFEXOR) 75 MG tablet TAKE TWO TABLETS BY MOUTH TWICE A  tablet 0           Review of Systems   CONSTITUTIONAL: NEGATIVE for fever, chills, change in weight  ENT/MOUTH: NEGATIVE for ear, mouth and throat problems  RESP: NEGATIVE for significant cough or SOB  CV: NEGATIVE for chest pain, palpitations or peripheral edema      Objective    Vitals - Patient Reported  Pain Score: No Pain (0)      Vitals:  No vitals were obtained today due to virtual visit.    Physical Exam   healthy, alert, and no distress  PSYCH: Alert and oriented times 3; coherent speech, normal   rate and volume, able to articulate logical thoughts, able   to abstract reason, no tangential thoughts, no hallucinations   or delusions  Her affect is normal  RESP: No cough, no audible wheezing, able to talk in full sentences  Remainder of exam unable to be completed due to telephone visits                Phone call duration: 8 minutes

## 2023-11-13 ENCOUNTER — TELEPHONE (OUTPATIENT)
Dept: SURGERY | Facility: CLINIC | Age: 48
End: 2023-11-13

## 2023-11-13 ENCOUNTER — INFUSION THERAPY VISIT (OUTPATIENT)
Dept: INFUSION THERAPY | Facility: CLINIC | Age: 48
End: 2023-11-13
Attending: FAMILY MEDICINE
Payer: COMMERCIAL

## 2023-11-13 ENCOUNTER — LAB (OUTPATIENT)
Dept: LAB | Facility: CLINIC | Age: 48
End: 2023-11-13
Payer: COMMERCIAL

## 2023-11-13 VITALS
SYSTOLIC BLOOD PRESSURE: 90 MMHG | DIASTOLIC BLOOD PRESSURE: 63 MMHG | OXYGEN SATURATION: 95 % | TEMPERATURE: 97.9 F | HEART RATE: 92 BPM | RESPIRATION RATE: 16 BRPM

## 2023-11-13 DIAGNOSIS — R19.8 HIGH OUTPUT ILEOSTOMY (H): Primary | ICD-10-CM

## 2023-11-13 DIAGNOSIS — R19.8 HIGH OUTPUT ILEOSTOMY (H): ICD-10-CM

## 2023-11-13 DIAGNOSIS — R79.89 ELEVATED LFTS: ICD-10-CM

## 2023-11-13 DIAGNOSIS — K51.919 ULCERATIVE COLITIS WITH COMPLICATION, UNSPECIFIED LOCATION (H): ICD-10-CM

## 2023-11-13 DIAGNOSIS — Z93.2 HIGH OUTPUT ILEOSTOMY (H): ICD-10-CM

## 2023-11-13 DIAGNOSIS — Z93.2 HIGH OUTPUT ILEOSTOMY (H): Primary | ICD-10-CM

## 2023-11-13 LAB
ALBUMIN SERPL BCG-MCNC: 4 G/DL (ref 3.5–5.2)
ALP SERPL-CCNC: 155 U/L (ref 35–104)
ALT SERPL W P-5'-P-CCNC: 32 U/L (ref 0–50)
ANION GAP SERPL CALCULATED.3IONS-SCNC: 13 MMOL/L (ref 7–15)
AST SERPL W P-5'-P-CCNC: 26 U/L (ref 0–45)
BILIRUB DIRECT SERPL-MCNC: <0.2 MG/DL (ref 0–0.3)
BILIRUB SERPL-MCNC: <0.2 MG/DL
BUN SERPL-MCNC: 15 MG/DL (ref 6–20)
CALCIUM SERPL-MCNC: 10.1 MG/DL (ref 8.6–10)
CHLORIDE SERPL-SCNC: 99 MMOL/L (ref 98–107)
CREAT SERPL-MCNC: 0.85 MG/DL (ref 0.51–0.95)
DEPRECATED HCO3 PLAS-SCNC: 22 MMOL/L (ref 22–29)
EGFRCR SERPLBLD CKD-EPI 2021: 85 ML/MIN/1.73M2
GLUCOSE SERPL-MCNC: 105 MG/DL (ref 70–99)
HAV IGG SER QL IA: NONREACTIVE
HBV CORE AB SERPL QL IA: NONREACTIVE
HBV SURFACE AB SERPL IA-ACNC: 0.06 M[IU]/ML
HBV SURFACE AB SERPL IA-ACNC: NONREACTIVE M[IU]/ML
HBV SURFACE AG SERPL QL IA: NONREACTIVE
HCV AB SERPL QL IA: NONREACTIVE
MAGNESIUM SERPL-MCNC: 1.5 MG/DL (ref 1.7–2.3)
POTASSIUM SERPL-SCNC: 4.4 MMOL/L (ref 3.4–5.3)
PROT SERPL-MCNC: 7.3 G/DL (ref 6.4–8.3)
SODIUM SERPL-SCNC: 134 MMOL/L (ref 135–145)
TOTAL PROTEIN SERUM FOR ELP: 6.9 G/DL (ref 6.4–8.3)

## 2023-11-13 PROCEDURE — 86038 ANTINUCLEAR ANTIBODIES: CPT | Performed by: DIETITIAN, REGISTERED

## 2023-11-13 PROCEDURE — 82248 BILIRUBIN DIRECT: CPT | Performed by: DIETITIAN, REGISTERED

## 2023-11-13 PROCEDURE — 84165 PROTEIN E-PHORESIS SERUM: CPT | Mod: 26 | Performed by: PATHOLOGY

## 2023-11-13 PROCEDURE — 84165 PROTEIN E-PHORESIS SERUM: CPT | Mod: TC | Performed by: PATHOLOGY

## 2023-11-13 PROCEDURE — 86708 HEPATITIS A ANTIBODY: CPT | Performed by: DIETITIAN, REGISTERED

## 2023-11-13 PROCEDURE — 86706 HEP B SURFACE ANTIBODY: CPT | Performed by: DIETITIAN, REGISTERED

## 2023-11-13 PROCEDURE — 82784 ASSAY IGA/IGD/IGG/IGM EACH: CPT | Performed by: DIETITIAN, REGISTERED

## 2023-11-13 PROCEDURE — 83735 ASSAY OF MAGNESIUM: CPT

## 2023-11-13 PROCEDURE — 86364 TISS TRNSGLTMNASE EA IG CLAS: CPT | Performed by: DIETITIAN, REGISTERED

## 2023-11-13 PROCEDURE — 86015 ACTIN ANTIBODY EACH: CPT | Performed by: DIETITIAN, REGISTERED

## 2023-11-13 PROCEDURE — 86381 MITOCHONDRIAL ANTIBODY EACH: CPT | Performed by: DIETITIAN, REGISTERED

## 2023-11-13 PROCEDURE — 86704 HEP B CORE ANTIBODY TOTAL: CPT | Performed by: DIETITIAN, REGISTERED

## 2023-11-13 PROCEDURE — 87340 HEPATITIS B SURFACE AG IA: CPT | Performed by: DIETITIAN, REGISTERED

## 2023-11-13 PROCEDURE — 258N000003 HC RX IP 258 OP 636

## 2023-11-13 PROCEDURE — 250N000011 HC RX IP 250 OP 636: Mod: JZ | Performed by: FAMILY MEDICINE

## 2023-11-13 PROCEDURE — 82103 ALPHA-1-ANTITRYPSIN TOTAL: CPT | Performed by: DIETITIAN, REGISTERED

## 2023-11-13 PROCEDURE — 86803 HEPATITIS C AB TEST: CPT | Performed by: DIETITIAN, REGISTERED

## 2023-11-13 PROCEDURE — 36415 COLL VENOUS BLD VENIPUNCTURE: CPT

## 2023-11-13 PROCEDURE — 96365 THER/PROPH/DIAG IV INF INIT: CPT

## 2023-11-13 PROCEDURE — 84155 ASSAY OF PROTEIN SERUM: CPT | Mod: 91 | Performed by: DIETITIAN, REGISTERED

## 2023-11-13 RX ORDER — HEPARIN SODIUM,PORCINE 10 UNIT/ML
5-20 VIAL (ML) INTRAVENOUS DAILY PRN
Status: CANCELLED | OUTPATIENT
Start: 2023-11-13

## 2023-11-13 RX ORDER — MAGNESIUM SULFATE HEPTAHYDRATE 40 MG/ML
2 INJECTION, SOLUTION INTRAVENOUS ONCE
Status: COMPLETED | OUTPATIENT
Start: 2023-11-13 | End: 2023-11-13

## 2023-11-13 RX ORDER — HEPARIN SODIUM (PORCINE) LOCK FLUSH IV SOLN 100 UNIT/ML 100 UNIT/ML
5 SOLUTION INTRAVENOUS
Status: CANCELLED | OUTPATIENT
Start: 2023-11-13

## 2023-11-13 RX ORDER — DIPHENHYDRAMINE HYDROCHLORIDE 50 MG/ML
50 INJECTION INTRAMUSCULAR; INTRAVENOUS
Status: CANCELLED
Start: 2023-11-13

## 2023-11-13 RX ORDER — MEPERIDINE HYDROCHLORIDE 25 MG/ML
25 INJECTION INTRAMUSCULAR; INTRAVENOUS; SUBCUTANEOUS EVERY 30 MIN PRN
Status: CANCELLED | OUTPATIENT
Start: 2023-11-13

## 2023-11-13 RX ORDER — ALBUTEROL SULFATE 0.83 MG/ML
2.5 SOLUTION RESPIRATORY (INHALATION)
Status: CANCELLED | OUTPATIENT
Start: 2023-11-13

## 2023-11-13 RX ORDER — METHYLPREDNISOLONE SODIUM SUCCINATE 125 MG/2ML
125 INJECTION, POWDER, LYOPHILIZED, FOR SOLUTION INTRAMUSCULAR; INTRAVENOUS
Status: CANCELLED
Start: 2023-11-13

## 2023-11-13 RX ORDER — EPINEPHRINE 1 MG/ML
0.3 INJECTION, SOLUTION, CONCENTRATE INTRAVENOUS EVERY 5 MIN PRN
Status: CANCELLED | OUTPATIENT
Start: 2023-11-13

## 2023-11-13 RX ORDER — ALBUTEROL SULFATE 90 UG/1
1-2 AEROSOL, METERED RESPIRATORY (INHALATION)
Status: CANCELLED
Start: 2023-11-13

## 2023-11-13 RX ADMIN — SODIUM CHLORIDE 1000 ML: 9 INJECTION, SOLUTION INTRAVENOUS at 09:15

## 2023-11-13 RX ADMIN — MAGNESIUM SULFATE HEPTAHYDRATE 2 G: 2 INJECTION, SOLUTION INTRAVENOUS at 10:59

## 2023-11-13 NOTE — PROGRESS NOTES
Infusion Nursing Note:  Doretha Yu presents today for IV Fluids.    Patient seen by provider today: No   present during visit today: Not Applicable.    Note: Pt is a very difficult IV and blood draw. Had to call the admin supervisor to bring the ultrasound machine.    Able to place the IV with ultra sound and it flushed well, but unable to get blood return to get labs despite several attempts and adjustments. However, placement is confirmed through the ultrasound.     Had to use the butterfly needle with several attempts and ultrasound with 2 staff to get the blood for labs.    Providers contacted and okayed leaving the IV in place until the next infusion on Wednesday.     Intravenous Access:  Peripheral IV placed.    Treatment Conditions:  Lab Results   Component Value Date    HGB 10.1 (L) 11/08/2023    WBC 11.8 (H) 11/08/2023    ANEU 14.4 (H) 10/04/2021    ANEUTAUTO 3.0 11/02/2023     (H) 11/08/2023        Lab Results   Component Value Date     (L) 11/13/2023    POTASSIUM 4.4 11/13/2023    MAG 1.5 (L) 11/13/2023    CR 0.85 11/13/2023    PATRICIA 10.1 (H) 11/13/2023    BILITOTAL <0.2 11/13/2023    ALBUMIN 4.0 11/13/2023    ALT 32 11/13/2023    AST 26 11/13/2023       Results reviewed, labs MET treatment parameters, ok to proceed with treatment.    Electrolyte protocol now ordered and Mag was replaced  this visit.     Post Infusion Assessment:  Patient tolerated infusion without incident.  Blood return noted pre and post infusion.  Site patent and intact, free from redness, edema or discomfort.  No evidence of extravasations.  Access discontinued per protocol.       Discharge Plan:   Discharge instructions reviewed with: Patient.  Patient and/or family verbalized understanding of discharge instructions and all questions answered.  Patient discharged in stable condition accompanied by: self.  Departure Mode: Ambulatory.      Shakila Vargas RN

## 2023-11-14 ENCOUNTER — MYC MEDICAL ADVICE (OUTPATIENT)
Dept: GASTROENTEROLOGY | Facility: CLINIC | Age: 48
End: 2023-11-14
Payer: COMMERCIAL

## 2023-11-14 LAB
ALBUMIN SERPL ELPH-MCNC: 3.6 G/DL (ref 3.7–5.1)
ALPHA1 GLOB SERPL ELPH-MCNC: 0.4 G/DL (ref 0.2–0.4)
ALPHA2 GLOB SERPL ELPH-MCNC: 1.1 G/DL (ref 0.5–0.9)
ANA SER QL IF: NEGATIVE
B-GLOBULIN SERPL ELPH-MCNC: 0.9 G/DL (ref 0.6–1)
GAMMA GLOB SERPL ELPH-MCNC: 0.9 G/DL (ref 0.7–1.6)
IGA SERPL-MCNC: 135 MG/DL (ref 84–499)
M PROTEIN SERPL ELPH-MCNC: 0 G/DL
MITOCHONDRIA M2 IGG SER-ACNC: <1 U/ML
PROT PATTERN SERPL ELPH-IMP: ABNORMAL
TTG IGA SER-ACNC: 0.3 U/ML
TTG IGG SER-ACNC: 1.4 U/ML

## 2023-11-15 ENCOUNTER — INFUSION THERAPY VISIT (OUTPATIENT)
Dept: INFUSION THERAPY | Facility: CLINIC | Age: 48
End: 2023-11-15
Attending: FAMILY MEDICINE
Payer: COMMERCIAL

## 2023-11-15 ENCOUNTER — PATIENT OUTREACH (OUTPATIENT)
Dept: SURGERY | Facility: CLINIC | Age: 48
End: 2023-11-15

## 2023-11-15 ENCOUNTER — HOSPITAL ENCOUNTER (OUTPATIENT)
Dept: WOUND CARE | Facility: CLINIC | Age: 48
Discharge: HOME | End: 2023-11-15
Attending: FAMILY MEDICINE | Admitting: FAMILY MEDICINE
Payer: COMMERCIAL

## 2023-11-15 ENCOUNTER — PATIENT OUTREACH (OUTPATIENT)
Dept: CARE COORDINATION | Facility: CLINIC | Age: 48
End: 2023-11-15
Payer: COMMERCIAL

## 2023-11-15 VITALS — DIASTOLIC BLOOD PRESSURE: 74 MMHG | SYSTOLIC BLOOD PRESSURE: 107 MMHG

## 2023-11-15 DIAGNOSIS — R19.8 HIGH OUTPUT ILEOSTOMY (H): Primary | ICD-10-CM

## 2023-11-15 DIAGNOSIS — Z93.2 HIGH OUTPUT ILEOSTOMY (H): Primary | ICD-10-CM

## 2023-11-15 LAB — SMA IGG SER-ACNC: 9 UNITS

## 2023-11-15 PROCEDURE — G0463 HOSPITAL OUTPT CLINIC VISIT: HCPCS

## 2023-11-15 PROCEDURE — 258N000003 HC RX IP 258 OP 636

## 2023-11-15 PROCEDURE — 96360 HYDRATION IV INFUSION INIT: CPT

## 2023-11-15 RX ORDER — MEPERIDINE HYDROCHLORIDE 25 MG/ML
25 INJECTION INTRAMUSCULAR; INTRAVENOUS; SUBCUTANEOUS EVERY 30 MIN PRN
Status: CANCELLED | OUTPATIENT
Start: 2023-11-15

## 2023-11-15 RX ORDER — DIPHENHYDRAMINE HYDROCHLORIDE 50 MG/ML
50 INJECTION INTRAMUSCULAR; INTRAVENOUS
Status: CANCELLED
Start: 2023-11-15

## 2023-11-15 RX ORDER — EPINEPHRINE 1 MG/ML
0.3 INJECTION, SOLUTION, CONCENTRATE INTRAVENOUS EVERY 5 MIN PRN
Status: CANCELLED | OUTPATIENT
Start: 2023-11-15

## 2023-11-15 RX ORDER — METHYLPREDNISOLONE SODIUM SUCCINATE 125 MG/2ML
125 INJECTION, POWDER, LYOPHILIZED, FOR SOLUTION INTRAMUSCULAR; INTRAVENOUS
Status: CANCELLED
Start: 2023-11-15

## 2023-11-15 RX ORDER — HEPARIN SODIUM (PORCINE) LOCK FLUSH IV SOLN 100 UNIT/ML 100 UNIT/ML
5 SOLUTION INTRAVENOUS
Status: CANCELLED | OUTPATIENT
Start: 2023-11-15

## 2023-11-15 RX ORDER — ALBUTEROL SULFATE 0.83 MG/ML
2.5 SOLUTION RESPIRATORY (INHALATION)
Status: CANCELLED | OUTPATIENT
Start: 2023-11-15

## 2023-11-15 RX ORDER — ALBUTEROL SULFATE 90 UG/1
1-2 AEROSOL, METERED RESPIRATORY (INHALATION)
Status: CANCELLED
Start: 2023-11-15

## 2023-11-15 RX ORDER — HEPARIN SODIUM,PORCINE 10 UNIT/ML
5-20 VIAL (ML) INTRAVENOUS DAILY PRN
Status: CANCELLED | OUTPATIENT
Start: 2023-11-15

## 2023-11-15 RX ADMIN — SODIUM CHLORIDE 1000 ML: 9 INJECTION, SOLUTION INTRAVENOUS at 13:18

## 2023-11-15 ASSESSMENT — ACTIVITIES OF DAILY LIVING (ADL): DEPENDENT_IADLS:: INDEPENDENT

## 2023-11-15 NOTE — PROGRESS NOTES
[facility]    Start of Care in [facility]: 9/15/2023   Referring Doctor: Judi PARKER  Primary Care Provider: Anna Naidu   Wound Location: abdomen - ostomy takedown site   Type of Ostomy/Surgery Date/Surgeon: loop ileostomy (second stage of IPAA J pouch surgery), 9/5/23, Dr. Ram    Reason for Visit: ileostomy recheck     Subjective: Pt changed her pouch yesterday. Pt tearful during visit - states she didn't feel any better upon hospital discharge than she did on admission (hospitalized 10/30-11/8). She feels dizzy and has no appetite. She states her output was closer to 1000/1100mL yesterday. She is scheduled for IV infusion at 2pm today (this writer called and pt able to go as soon as this appointment is finished).    She's been changing her pouch every 2-3 days.    Pt arrived independently.    HPI/Pertinent information from chart review:  From hospital discharge:  This is a 48 year old female with complex medical history to include ulcerative colitis s/p TAC with end ileostomy (laparoscopic) on 6/15/21 with subsequent parastomal hernia, inflammatory arthritis secondary to immunotherapy, chronic prednisone use, type 2 diabetes, obesity, history of CVA in 2004 after giving birth, prothrombin deficiency, obstructive sleep apnea, asthma, lymphedema, lumbago, chronic pain, depression, anxiety and insomnia. She is now s/p robotic proctectomy, ileostomy takedown, and creation of ileal J-pouch.     There was some concern regarding possible ileovaginal fistula based on thick brown/red vaginal drainage, though this was thought to be less likely based on operative and exam findings. If there continues to be ongoing concerns of drainage of stool or succus from the vagina, we would recommend vaginal contrast vs pouchogram to further assess the presence of a fistula.     1.  Laparoscopic robotic-assisted completion proctectomy.  2.  Takedown of end ileostomy  3.   Creation of ileal J-pouch with ileoanal anastomosis   4.  Flexible pouchoscopy/sigmoidoscopy.  5.  Diverting loop ileostomy creation     Past Medical History:  Patient Active Problem List   Diagnosis    CARDIOVASCULAR SCREENING; LDL GOAL LESS THAN 160    DUB (dysfunctional uterine bleeding)    Anxiety state    Esophageal reflux    Moderate major depression (H)    Mild intermittent asthma without complication    Vision changes    Prothrombin mutation (H24)    Metastatic malignant melanoma (H)    Malignant melanoma of left upper extremity including shoulder (H)    Functional diarrhea    Colitis    Rectal bleeding    Bilateral leg cramps    Rash and nonspecific skin eruption    Other chronic pain    Immunosuppressed status (H24)    Ulcerative colitis with complication, unspecified location (H)    Morbid obesity (H)    Cervical high risk HPV (human papillomavirus) test positive    STORMY (obstructive sleep apnea)    Secondary lymphedema    Chronic neutrophilia    Diabetes mellitus, iatrogenic (H)    High risk HPV infection    Abdominal pain    Anemia    Candidiasis of mouth    Hypocalcemia    Malaise    Menstrual irregularity    Arthritis, rheumatoid (H)    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 difficile colitis    Ulcerative colitis without complications, unspecified location (H)    Colostomy in place (H)    S/P colectomy    Polyarthralgia    Drug-induced polyneuropathy (H24)    Arthralgia of both knees    Adjustment disorder with mixed emotional features    Migraines    Biliary dyskinesia    Gastroesophageal reflux disease without esophagitis    Post-op pain    High output ileostomy (H)                     Tobacco Use:     Tobacco Use      Smoking status: Former        Packs/day: 1.00        Years: 5.00         "Additional pack years: 0.00        Total pack years: 5.00        Types: Cigarettes        Quit date: 3/20/1998        Years since quittin.6      Smokeless tobacco: Never       Diabetic: yes  HgbA1C:   Hemoglobin A1C   Date Value Ref Range Status   10/25/2023 6.5 (H) 0.0 - 5.6 % Final     Comment:     Normal <5.7%   Prediabetes 5.7-6.4%    Diabetes 6.5% or higher     Note: Adopted from ADA consensus guidelines.   2021 7.5 (H) 0 - 5.6 % Final     Comment:     Normal <5.7% Prediabetes 5.7-6.4%  Diabetes 6.5% or higher - adopted from ADA   consensus guidelines.       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 pt     Participants in cares today: pt    Any limitations (dexterity, vision, hearing)? no     No new photo  Location: RLQ  Color/Moisture: red, moist  Viable: yes  Size: 1\" - not remeasured as stoma very active during visit and pt stated it is measuring the same size  Shape: round  MCJ: intact  Os: 2 - both are on the apex; with the shape of the stoma the 2 stomas fold in toward one another  Protrusion: fluctuates from flush to up to 2cm  Peristomal skin: scattered denudements and erythema noted circumferentially extending about 2-3cm from liquid stool; scant pink along edges of elastic barrier strip  Output: liquid gold/brown  Abdominal profile/plane: stoma sinks into abdomen with sitting, standing, leaning; area around stoma is soft so appropriate for deep convexity (prior)    Assessment:  Loop ileostomy with minimal protrusion, unusual postioning of os's, and soft abdomen - currently having reliable wear time of 2-3 " "days  Pt voicing feeling dizzy/exhausted, poor appetite. She's scheduled for IV infusion after this visit.  Recently hospitalized from 10/30-11/8  Pt with minimal support at home (high school daughter), no home care available to see pt    Plan of Care:  Ostomy: Continuing plan of care - stoma powder followed by 3M Cavilon No Sting Barrier Film, deep convex one-piece Coloplast SenSura [place] pouch 61407 cut to 1\", Sarah ring, belt, Coloplast Elastic Barrier Strips. Continue to change every 2-3 days.  Continue with bowel medications per colorectal.    Discussion/Education:  actively listened/acknowledged pts frustrations; plan of care with rationale    Prior - Imodium - to increase and can take up to 8/day (only taking 3/day at this time); eating marshmallows or peanut butter (or both) prior to going to sleep to help thicken stool; pt is able to perform cares     Cares Performed:  Pouch change per above plan of care    Topical care: none      The following discharge instructions were reviewed with and sent home with the patient (prior; no new today):  See prior AVS    The following supplies were sent home with the patient:  none    Return visit: not scheduled    Sent CellSpint message to patient about scheduling follow up if needed.    Verbal, written (prior), & demonstrative education provided.  Face to face time: approximately 45 minutes  Procedure: none    [place] Huffman RN, CWOCN  [phone]   "

## 2023-11-15 NOTE — PROGRESS NOTES
Clinic Care Coordination Contact  Follow Up Progress Note   10/30/2023 - 11/8/2023 (9 days)  [facility]     Madeline Easton MD  Last attending  Treatment team High output ileostomy (H)  Principal problem   Discharge Diagnoses  Nivolumab induced UC  Increased ostomy output  Severe protein calorie malnutrition   Pneumonia   Hypomagnesemia   Aphthous ulcers   Hyponatremia   Chronic Transaminitis   Depression  Anxiety  DM2   Hypothyroidism   Normocytic Anemia   STORMY   Chronic Pain   Prothrombin Mutation   Melanoma history      Assessment: Patient continues outpatient IV hydration Patient is at the hospital now during our conversation   Colostomy output continues to be the same since her hospital discharge 1500 CC   Patient continues to experience dizziness  Patient is going to notify Colorectal surgeon and requests a note for work .  Hospital note said she could go back to work Monday 11/20     Care Gaps:    Health Maintenance Due   Topic Date Due    DIABETIC FOOT EXAM  Never done    Pneumococcal Vaccine: Pediatrics (0 to 5 Years) and At-Risk Patients (6 to 64 Years) (1 - PCV) Never done    YEARLY PREVENTIVE VISIT  03/02/2016    EYE EXAM  10/12/2018    ASTHMA ACTION PLAN  01/28/2020    DTAP/TDAP/TD IMMUNIZATION (7 - Td or Tdap) 04/09/2022    PAP FOLLOW-UP  08/20/2022    HPV FOLLOW-UP  08/20/2022    URINE DRUG SCREEN  05/18/2023    MAMMO SCREENING  05/31/2023    INFLUENZA VACCINE (1) 09/01/2023    COVID-19 Vaccine (4 - 2023-24 season) 09/01/2023       Not discussed today     Care Plans  Care Plan: General       Problem: HP GENERAL PROBLEM       Goal: I will have a good plan for wound care in the next week       Start Date: 9/15/2023 Expected End Date: 11/30/2023    This Visit's Progress: 70% Recent Progress: 70%    Note:     Barriers: Anxiety  Unable to find home care services   Strengths: lives with daughter   Patient expressed understanding of goal: Yes   Action  steps to achieve this goal:  1. I will have a Community Paramedic visit Not needed   2. Care coordinator will call surgeons office and ostomy RN for supplies   3. I will keep wound clinic appointments   4. I will keep IV hydration appointments  5. I will continue to measure Ostomy output                               Intervention/Education provided during outreach: continue to measure Ostomy output and call surgeons office with questions or concerns      Outreach Frequency: weekly, more frequently as needed        Plan:     Care Coordinator will follow up in 1-2 weeks     [facility]   Gisel Marte RN, Care Coordinator   [facility]   E-mail [email or web address]   [phone]

## 2023-11-15 NOTE — PROGRESS NOTES
Infusion Nursing Note:  Doretha Yu presents today for IVF.    Patient seen by provider today: No   present during visit today: Not Applicable.    Note: Pt very dizzy, felt like she was going to pass out, diaphoretic. Pt said her doctor thinks it is from her ostomy. IVF given today.    Pt unsure how often she needs lab drawn. Message sent to TEJINDER Espinal to determine frequency of lab draws. Labs should be weekly per RN.      Intravenous Access:  Peripheral IV placed.    Treatment Conditions:  Not Applicable.      Post Infusion Assessment:  Patient tolerated infusion without incident.  Blood return noted pre and post infusion.  Site patent and intact, free from redness, edema or discomfort.  No evidence of extravasations.  Access left in place for infusion on Friday (11/17)      Discharge Plan:   Copy of AVS reviewed with patient and/or family.  Patient will return 11/17/23 for next appointment.  Patient discharged in stable condition accompanied by: self.  Departure Mode: Ambulatory.      ALEX MENDEZ RN

## 2023-11-15 NOTE — PROGRESS NOTES
Spoke with Doretha. She states that when she eats or drinks she will immediatly have output. The only thing that thickens her stool is fiber which she is maxed out on. She is drinking plenty of fluids throughout the day with electrolyte supplements and on MWF IV infusions. Maxed out on imodium. Increase lomotil to 2 tablet QID. Updated Dr. Ram and GI.

## 2023-11-16 LAB — A1AT SERPL-MCNC: 169 MG/DL (ref 90–200)

## 2023-11-17 ENCOUNTER — VIRTUAL VISIT (OUTPATIENT)
Dept: GASTROENTEROLOGY | Facility: CLINIC | Age: 48
End: 2023-11-17
Payer: COMMERCIAL

## 2023-11-17 ENCOUNTER — INFUSION THERAPY VISIT (OUTPATIENT)
Dept: INFUSION THERAPY | Facility: CLINIC | Age: 48
End: 2023-11-17
Attending: STUDENT IN AN ORGANIZED HEALTH CARE EDUCATION/TRAINING PROGRAM
Payer: COMMERCIAL

## 2023-11-17 VITALS — DIASTOLIC BLOOD PRESSURE: 76 MMHG | HEART RATE: 88 BPM | SYSTOLIC BLOOD PRESSURE: 109 MMHG | RESPIRATION RATE: 16 BRPM

## 2023-11-17 DIAGNOSIS — R19.8 HIGH OUTPUT ILEOSTOMY (H): Primary | ICD-10-CM

## 2023-11-17 DIAGNOSIS — K51.90 ULCERATIVE COLITIS WITHOUT COMPLICATIONS, UNSPECIFIED LOCATION (H): Primary | ICD-10-CM

## 2023-11-17 DIAGNOSIS — Z93.2 HIGH OUTPUT ILEOSTOMY (H): ICD-10-CM

## 2023-11-17 DIAGNOSIS — R79.89 ELEVATED LFTS: ICD-10-CM

## 2023-11-17 DIAGNOSIS — K94.19 ALTERED BOWEL ELIMINATION DUE TO INTESTINAL OSTOMY (H): ICD-10-CM

## 2023-11-17 DIAGNOSIS — R19.8 HIGH OUTPUT ILEOSTOMY (H): ICD-10-CM

## 2023-11-17 DIAGNOSIS — Z93.2 HIGH OUTPUT ILEOSTOMY (H): Primary | ICD-10-CM

## 2023-11-17 PROCEDURE — 99215 OFFICE O/P EST HI 40 MIN: CPT | Mod: 24 | Performed by: DIETITIAN, REGISTERED

## 2023-11-17 PROCEDURE — 258N000003 HC RX IP 258 OP 636

## 2023-11-17 PROCEDURE — 96360 HYDRATION IV INFUSION INIT: CPT

## 2023-11-17 PROCEDURE — 99417 PROLNG OP E/M EACH 15 MIN: CPT | Performed by: DIETITIAN, REGISTERED

## 2023-11-17 PROCEDURE — 250N000011 HC RX IP 250 OP 636: Performed by: STUDENT IN AN ORGANIZED HEALTH CARE EDUCATION/TRAINING PROGRAM

## 2023-11-17 RX ORDER — METHYLPREDNISOLONE SODIUM SUCCINATE 40 MG/ML
40 INJECTION, POWDER, LYOPHILIZED, FOR SOLUTION INTRAMUSCULAR; INTRAVENOUS ONCE
Status: CANCELLED
Start: 2023-12-06 | End: 2023-12-06

## 2023-11-17 RX ORDER — CHOLESTYRAMINE 4 G/9G
1 POWDER, FOR SUSPENSION ORAL 2 TIMES DAILY WITH MEALS
Qty: 60 PACKET | Refills: 1 | Status: SHIPPED | OUTPATIENT
Start: 2023-11-17 | End: 2024-04-24

## 2023-11-17 RX ORDER — ALBUTEROL SULFATE 90 UG/1
1-2 AEROSOL, METERED RESPIRATORY (INHALATION)
Status: CANCELLED
Start: 2023-11-17

## 2023-11-17 RX ORDER — HEPARIN SODIUM,PORCINE 10 UNIT/ML
5-20 VIAL (ML) INTRAVENOUS DAILY PRN
Status: CANCELLED | OUTPATIENT
Start: 2023-11-17

## 2023-11-17 RX ORDER — METHYLPREDNISOLONE SODIUM SUCCINATE 125 MG/2ML
125 INJECTION, POWDER, LYOPHILIZED, FOR SOLUTION INTRAMUSCULAR; INTRAVENOUS
Status: CANCELLED
Start: 2023-11-17

## 2023-11-17 RX ORDER — MEPERIDINE HYDROCHLORIDE 25 MG/ML
25 INJECTION INTRAMUSCULAR; INTRAVENOUS; SUBCUTANEOUS EVERY 30 MIN PRN
Status: CANCELLED | OUTPATIENT
Start: 2023-11-17

## 2023-11-17 RX ORDER — ALBUTEROL SULFATE 0.83 MG/ML
2.5 SOLUTION RESPIRATORY (INHALATION)
Status: CANCELLED | OUTPATIENT
Start: 2023-11-17

## 2023-11-17 RX ORDER — DIPHENHYDRAMINE HYDROCHLORIDE 50 MG/ML
50 INJECTION INTRAMUSCULAR; INTRAVENOUS
Status: CANCELLED
Start: 2023-11-17

## 2023-11-17 RX ORDER — EPINEPHRINE 1 MG/ML
0.3 INJECTION, SOLUTION, CONCENTRATE INTRAVENOUS EVERY 5 MIN PRN
Status: CANCELLED | OUTPATIENT
Start: 2023-11-17

## 2023-11-17 RX ORDER — HEPARIN SODIUM (PORCINE) LOCK FLUSH IV SOLN 100 UNIT/ML 100 UNIT/ML
5 SOLUTION INTRAVENOUS
Status: CANCELLED | OUTPATIENT
Start: 2023-11-17

## 2023-11-17 RX ORDER — ONDANSETRON 4 MG/1
4 TABLET, ORALLY DISINTEGRATING ORAL EVERY 6 HOURS PRN
Status: DISCONTINUED | OUTPATIENT
Start: 2023-11-17 | End: 2023-11-17 | Stop reason: HOSPADM

## 2023-11-17 RX ADMIN — SODIUM CHLORIDE 1000 ML: 9 INJECTION, SOLUTION INTRAVENOUS at 08:23

## 2023-11-17 RX ADMIN — ONDANSETRON 4 MG: 4 TABLET, ORALLY DISINTEGRATING ORAL at 08:46

## 2023-11-17 NOTE — PATIENT INSTRUCTIONS
It was a pleasure taking care of you today.  I've included a brief summary of our discussion and care plan from today's visit below.  Please review this information with your primary care provider.  ______________________________________________________________________    My recommendations are summarized as follows:  -- Continue IV Fluids 3 days per week  -- Continue loperamide 2 tabs four times per day  -- Continue lomotil 2 tabs four times per day  -- Continue metamucil, 1 Tbs three times per day  - Continue pantoprazole 40 mg BID   -- We can try cholestyramine to see if this helps slow output, try 1 packet twice daily  -- Utilize Pedialyte or Drip Drop for oral rehydration, try to take frequent sips, avoid chugging  -- Meet with dietitian, hopefully next week to discuss diet and fluid recommendations to assist with ostomy output  -- We will continue to monitor liver labs with your other standing labs, I will add on one test to take a better look at the Alkaline Phosphatase  -- Schedule MRCP (MRI of liver ducts)    -- please see scheduling information provided below     Return to GI Clinic in 1 months to review your progress.    ______________________________________________________________________    How do I schedule labs, imaging studies, or procedures that were ordered in clinic today?     Labs: To schedule lab appointment at the [facility], use my chart or call [phone]. If you have a [place] lab closer to home where you are regularly seen you can give them a call.     Procedures: If a colonoscopy, upper endoscopy, breath test, esophageal manometry, or pH impedence was ordered today, our endoscopy team will call you to schedule this. If you have not heard from our endoscopy team within a week, please call [phone] to schedule.     Imaging Studies: If you were scheduled for a CT scan, X-ray, MRI, ultrasound, HIDA scan or other imaging study, please call [phone] to have this  scheduled.     Referral: If a referral to another specialty was ordered, expect a phone call or follow instructions above. If you have not heard from anyone regarding your referral in a week, please call our clinic to check the status.     Who do I call with any questions after my visit?  Please be in touch if there are any further questions that arise following today's visit.  There are multiple ways to contact your gastroenterology care team.      During business hours, you may reach a Gastroenterology nurse at [phone]    To schedule or reschedule an appointment, please call [phone].     You can always send a secure message through Sontra.  Sontra messages are answered by your nurse or doctor typically within 24 hours.  Please allow extra time on weekends and holidays.      For urgent/emergent questions after business hours, you may reach the on-call GI Fellow by contacting the [facility]  at [phone].     How will I get the results of any tests ordered?    You will receive all of your results.  If you have signed up for [employer], any tests ordered at your visit will be available to you after your provider reviews them.  Typically this takes 1-2 weeks.  If there are urgent results that require a change in your care plan, your provider or nurse will call you to discuss the next steps.      What is Sontra?  Sontra is a secure way for you to access all of your healthcare records from the [facility].  It is a web based computer program, so you can sign on to it from any location.  It also allows you to send secure messages to your care team.  I recommend signing up for Sontra access if you have not already done so and are comfortable with using a computer.      How to I schedule a follow-up visit?  If you did not schedule a follow-up visit today, please call [phone] to schedule a follow-up office visit.      Sincerely,    Beti Issa PA-C  Division of  Gastroenterology, Hepatology & Nutrition  [facility]

## 2023-11-17 NOTE — LETTER
11/17/2023         RE: Doretha Yu  [id number]  [facility] [address]        Dear Colleague,    Thank you for referring your patient, Doretha Yu, to the [facility] [place]. Please see a copy of my visit note below.      IBD CLINIC VISIT     Telephone Call:   Total Time 43 minutes      CC/REFERRING MD:  No ref. provider found  REASON FOR CONSULTATION: high ilestomy output, elevated LFTs    ASSESSMENT/PLAN    48 year old female with history prothrombin deficiency, CVA in 2004 after giving birth, asthma,  chronic pain, depression, anxiety,  melanoma and nivolumab induced UC now s/p total abdominal colectomy and end ileostomy on 6/15/2021 with Dr. Ram and robotic completion proctectomy with creation of J-pouch with IPAA, takedown of end ileostomy, and diverting loop ileostomy 9/5/23. Following up in clinic due to high ileostomy output and elevated LFTs.    1. 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 Surgical path consistent with moderate disease. She continues to follow closely with colorectal surgery at this time.  Since second surgery has had increased outputs, worse over past 3-4 weeks with estimated 7023-1429 cc out daily.  Work-up with infectious stool studies including enteric panel and C. difficile have been negative, inflammatory markers have been elevated however fecal calprotectin normal and CT enterography done which did not show any evidence of bowel inflammation.  It is not fully clear what is driving her high output.  Fiber supplement has thickened stool some, we will plan to continue this.  Also will continue max dose of loperamide at 2 tabs 4 times daily and Lomotil max dose at 2 tabs 4 times daily.  Possible component of bile acid malabsorption contributing  to loose stools, will try addition of cholestyramine at 1 packet twice daily to start.  This can be further increased if she finds is beneficial.  To help maintain hydration she should also continue outpatient IV fluids, this is currently 3 days a week, pending hydration status may benefit from increased frequency.    She is drinking significant amounts of water, often plain water, diluted Pedialyte, Gatorade or liquid IV.  She does sometimes drink significant amounts at once.  Possible dietary and fluid choices and patterns driving more output.  I think she would benefit from meeting with dietitian to further discuss.  Recommended oral rehydration solutions with full Pedialyte or drip drop and taking frequent sips rather than jugs.  Additionally recommended avoidance of sugars and sugar alcohols.    If high output continues despite cholestyramine and nutritional interventions could consider EGD and ileoscopy for further assessment.   It would be beneficial to have her stools better controlled prior to surgery as suspect she would continue to have high output from her J-pouch after connection.    -- Continue IV Fluids 3 days per week  -- Continue loperamide 2 tabs four times per day  -- Continue lomotil 2 tabs four times per day  -- Continue metamucil, 1 Tbs three times per day  - Continue pantoprazole 40 mg BID   -- We can try cholestyramine to see if this helps slow output, try 1 packet twice daily  -- Utilize Pedialyte or Drip Drop for oral rehydration, try to take frequent sips, avoid chugging  -- Meet with dietitian, hopefully next week to discuss diet and fluid recommendations to assist with ostomy outputs      2. Elevated LFTs  Elevation of Alkaline Phosphatase, ALT and AST starting on draw 7/11/2023. Reassuringly, labs have trended down and ALT and AST have normalized within last week, most recent Alk Phos 155 on 11/13/23. Work up with hepatitis serologies, ISAEL, antimitochondrial antibody, alpha-1, celiac  serologies, SPEP, F-actin have been negative. Of note patient has history of fatty liver and biliary dyskinesia (7% EF on HIDA). Patient reported significant tylenol use with 4000 mg daily since surgery 9/5/2023 until last 1-2 weeks.  We will continue to trends LFTs and fraction Alk Phos with persistent elevation. Will also proceed with MRCP for further evaluation.  -- Limit Tylenol use  -- Trend LFTs  -- Fractionate Alk Phos  -- Schedule MRCP       Return to clinic in 4 weeks    Thank you for this consultation.  It was a pleasure to participate in the care of this patient; please contact us with any further questions.  I spent a total of 70 minutes, face to face, was spent with this patient, >50% of which was counseling regarding the above delineated issues.    This note was created with voice recognition software, and while reviewed for accuracy, typos may remain.     Beti Issa PA-C  Inflammatory Bowel Disease Program   Division of Gastroenterology, Hepatology and Nutrition  [facility]        IBD HISTORY  Age at diagnosis: 2021  Extent of disease: proctosigmoid   Current UC medications: None  Prior UC surgeries:  total abdominal colectomy and end ileostomy 6/15/21   Prior IBD Medications:   Infliximab, 1 dose in the hospital good response  Vedolizumab, completed induction dosing  Prednisone, minimal response to high-dose oral and IV with significant weight gain  5-ASA, no response  Topical therapies to include enemas, no response    DRUG MONITORING  TPMT enzyme activity:     6-TGN/6-MMPN levels:    Biologic concentration:       DISEASE ASSESSMENT  Endoscopic assessment:   EGD 2/14/23 ([place])  Post-op Diagnoses:        - LA Grade A reflux esophagitis.        - Erythematous mucosa in the prepyloric region of the stomach. Biopsied.        - A few fundic gland polyps.        - A single non-bleeding angioectasia in the duodenum (posterior wall).        - Biopsies were taken with a cold forceps for  evaluation of celiac        disease.        - No clear cause of abdominal pain identified. No evidence of recurrent        melanoma.     Surgical Path:  Colon path 6/15/23:  FINAL DIAGNOSIS:   COLON, RESECTION:   - Moderate chronic active colitis with crypt abscess formation ([place]   grade 3)   - Negative for dysplasia and malignancy   - Viable surgical margins   - Two benign lymph nodes (0/2)     Surgical Pathology (9/5/2023):  A. ILEUM, ILEOSTOMY TRIM;  Ileo-cutaneous anastomotic junction with nonspecific mucosa inflammation, congestion, and other features consistent with ileostomy; no dysplasia or malignancy  B. RECTUM, COMPLETION PROCTECTOMY:  Mildly active chronic proctitis with:   -Neutrophilic cryptitis, mucosal atrophy and prominent reactive lymphoid hyperplasias  -Reactive mesorectal lymph nodes;  no dysplasia or malignancy  C. COLON, ANASTOMOTIC RINGS; EXCISION:   -Portions of small intestinal mucosa and wall with no morphologic abnormalities   -Portion of rectal mucosa and wall with chronic proctitis; no dysplasia or malignancy     Enterography:   -- MRE 12/27/2022 no findings for acute inflammation of the bowel    Fecal calprotectin: --    C diff: negative 10/26/23, negative 1/12/23,  was positive 3/2021 treated with vanco taper (at [place])     HPI:   Doretha Yu is a 48 year old female with significant PMH of melanoma unknown primary (2017), s/p left axillary LN dissection (2017, Dr. Cool) in remission, history of adjuvant nivolumab induced colitis in 2018 treated with remicade (x1 at [place]) and Entyvio, h/o c.diff (recent bout, started PO vanc 4/7), seronegative RA on prednisone & tocilizumab, steroid induced DM, history of cushings, chronic pain, with diagnosis of UC in 2021, received Entyvio x2 mostly steroid dependent x3 years.  She had multiple readmissions for hematochezia, acute on chronic abdominal & rectal pain, nausea for refractory UC and concerns regarding risks associated with melanoma  recurrence with using standard UC maintenance medications.  She underwent total abdominal colectomy and end ileostomy on 6/15/2021 with Dr. Ram. She is now s/p robotic completion proctectomy with creation of J-pouch with IPAA, takedown of end ileostomy, and diverting loop ileostomy 9/5/23. Plan for ileostomy closure 12/27/23.    Ileostomy output has been increased in surgery, however with worsening in past 3 to 4 weeks.     After our last visit she presented to the emergency room as was recommended given new onset fever and abdominal pain and reports of extreme weakness with ongoing high ileostomy output.  She was hospitalized 10/30-23-11/8/23.  CTE completed which was negative for any intra-abdominal process, fistula, abscess or bowel inflammation.  It did incidentally note pneumonia for which she was treated with 5 days of antibiotics.  Stool studies completed with normal fecal calprotectin negative enteric panel and negative C. difficile from both ostomy and J-pouch.  Adjustments made to bowel regimen including maximizing loperamide at 2 tablets 4 times daily, i restarting Lomotil, starting fiber 3 times a day, and starting pantoprazole 40 mg twice daily.  She notes that she does not feel her ostomy output slowed much during the hospital stay.    Today she reports that she continues to have high output, 8057-3966 ml daily. Liquid brown/yellow stool. No blood. Continues to feel dehydrated with dry mouth, darker urine, fatigue, weakness, dizziness.  Feels better on days when she gets her outpatient IV fluids but still tired and feels like most daily activities are an effort.  Currently getting fluids at outpatient infusion center 3 days a week Monday Wednesday Friday.  Given report of ongoing dizziness Lomotil was increased to 2 tablets 4 times daily just yesterday.    Overall she reports very minimal impact with medications on her ostomy output.  The only thing she thinks has made a difference is fiber  which thickens stool for about 1 to 2 hours after taking then turns back to liquid.    Reports she is drinking 7x16 ounce water bottles per day and 1 bottle of Pedialyte.  Occasional Gatorade.  Mixes plain water with Pedialyte, sometimes mixes with liquid IV.  Sometimes drinks significant amount of water at once.    Reports that she continues to feel nauseated, taking Zofran 1 tab every 6 hours.  Reports this helps just a little, more benefit from prior 8 mg dose.  Reports she is eating minimally.  No vomiting.  Sometimes dry heaving.    Notes weight has been trending down.  It was 208 pounds in September now 184 pounds.      sIBDQ:  IBDQ Score Date IBDQ - Total Score  (Higher score better)   4/10/2018   7:20 AM 32          ROS:    Constitutional, HEENT, cardiovascular, pulmonary, GI, , musculoskeletal, neuro, skin, endocrine and psych systems are negative, except as otherwise noted.    PHYSICAL EXAMINATION:  Constitutional: aaox3, cooperative, pleasant, not dyspneic/diaphoretic, no acute distress  Vitals reviewed: There were no vitals taken for this visit.  Wt:   Wt Readings from Last 2 Encounters:   11/07/23 88.6 kg (195 lb 5.2 oz)   10/26/23 84.8 kg (187 lb)      General appearance: Healthy appearing adult, in no acute distress  Eyes: Sclera anicteric, Pupils round and reactive to light  Ears, nose, mouth and throat: No obvious external lesions of ears and nose, Hearing intact  Neck: Symmetric, No obvious external lesions  Respiratory: Normal respiration, no use of accessory muscles   MSK: Gait normal  Skin: No rashes or jaundice   Psychiatric: Oriented to person, place and time, Appropriate mood and affect.       PERTINENT PAST MEDICAL HISTORY:  Past Medical History:   Diagnosis Date    Abnormal MRI     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     Anxiety     Basal cell carcinoma     Cervical high risk HPV (human papillomavirus) test positive 12/13/2019    See problem list    Colitis      Depression     Diabetes mellitus, iatrogenic (H) 2020    Esophageal reflux     Inflammatory arthritis     Insomnia     Intestinal giardiasis 2018    Lumbago     left lower back pain    Lymphedema     Malignant melanoma (H)     Melanoma (H) 10/23/2017    Migraines     Mild persistent asthma     Morbid obesity with BMI of 40.0-44.9, adult (H)     STORMY (obstructive sleep apnea)     Prothrombin deficiency (H)     takes 81mg asa daily    Stroke (cerebrum) (H)     During     TIA (transient ischemic attack)     Type 2 diabetes mellitus (H)     Ulcerative pancolitis (H)        PREVIOUS SURGERIES:  Past Surgical History:   Procedure Laterality Date    APPENDECTOMY      COLONOSCOPY N/A 10/18/2017    Procedure: COLONOSCOPY;  Colon;  Surgeon: Debbie Stephens MD;  [facility]    COLONOSCOPY N/A 2018    Procedure: COMBINED COLONOSCOPY, SINGLE OR MULTIPLE BIOPSY/POLYPECTOMY BY BIOPSY;  colon;  Surgeon: Bentia Schumacher MD;  [facility]: [facility]    COLONOSCOPY      multiple since  to present - about 6 total    DAVINCI ASSISTED TRANSANAL TOTAL MESORECTAL EXCISION N/A 2023    Procedure: COMPLETION PROCTECTOMY, ROBOT-ASSISTED, ILEAL POUCH ANASTAMOSIS;  Surgeon: Quinton Ram MD;  [facility]    DISSECT LYMPH NODE AXILLA Left 10/23/2017    Procedure: DISSECT LYMPH NODE AXILLA;  Left Axillary Lymph Node Dissection ;  Surgeon: Laurent Cool MD;  [facility]    EXAM UNDER ANESTHESIA PELVIC N/A 2020    Procedure: EXAM UNDER ANESTHESIA, PELVIS; with Cervical Biopsies, Vaginal Biopsy and Endocervical Curettings;  Surgeon: Melina Jung MD;  [facility]    GYN SURGERY  ,         LAPAROSCOPIC ASSISTED COLECTOMY N/A 06/15/2021    Procedure: laparoscopic total abdominal colectomy, end ileostomy;  Surgeon: Quinton Ram MD;  [facility]    REPAIR MOHS Left 2017    Procedure: REPAIR MOHS;  Left Upper Lid Moh's Reconstruction;   Surgeon: Kisha Bosch MD;  [facility]    SIGMOIDOSCOPY FLEXIBLE N/A 9/5/2023    Procedure: Sigmoidoscopy flexible;  Surgeon: Quinton Ram MD;  [facility]       ALLERGIES:     Allergies   Allergen Reactions    Bee Venom Swelling    Azithromycin Diarrhea    Erythromycin      Other reaction(s): GI intolerance, Vomiting    Fentanyl Other (See Comments)     sweating  sweating    Prochlorperazine Fatigue     Other reaction(s): Other (see comments)  Fatigue    Buspirone      Other reaction(s): GI intolerance  vomiting    Erythrocin Nausea and Vomiting    Gluten Meal      Celiac disease    Topamax [Topiramate]      Made her lethargic    Zithromax [Azithromycin Dihydrate] Diarrhea    Enbrel [Etanercept] Hives and Rash       PERTINENT MEDICATIONS:    Current Outpatient Medications:     acetaminophen (TYLENOL) 500 MG tablet, Take 1,000 mg by mouth every 6 hours as needed for mild pain, Disp: , Rfl:     albuterol (PROAIR HFA/PROVENTIL HFA/VENTOLIN HFA) 108 (90 Base) MCG/ACT inhaler, Inhale 2 puffs into the lungs every 4 hours as needed for shortness of breath / dyspnea, Disp: 18 g, Rfl: 1    ARIPiprazole (ABILIFY) 2 MG tablet, Take 1 tablet (2 mg) by mouth daily for 30 days, Disp: 30 tablet, Rfl: 0    artificial saliva (BIOTENE MT) SOLN solution, Swish and spit 2 mLs (2 sprays) in mouth 4 times daily, Disp: 44.3 mL, Rfl: 0    benzocaine-menthol (CHLORASEPTIC MAX) 15-10 MG lozenge, Place 1 lozenge inside cheek every hour as needed, Disp: 15 lozenge, Rfl: 0    Cyanocobalamin (B-12 PO), Take 1 tablet by mouth daily, Disp: , Rfl:     diphenoxylate-atropine (LOMOTIL) 2.5-0.025 MG tablet, Take 1 tablet by mouth 2 times daily (before meals) for 30 days, Disp: 60 tablet, Rfl: 0    famotidine (PEPCID) 20 MG tablet, Take 1 tablet (20 mg) by mouth 2 times daily for 10 days, Disp: 20 tablet, Rfl: 0    famotidine (PEPCID) 20 MG tablet, Take 1 tablet (20 mg) by mouth 2 times daily, Disp: 60 tablet, Rfl: 0     guaiFENesin-dextromethorphan (ROBITUSSIN DM) 100-10 MG/5ML syrup, Take 10 mLs by mouth every 4 hours as needed for cough, Disp: 236 mL, Rfl: 0    hydrOXYzine (ATARAX) 25 MG tablet, Take 1 tablet (25 mg) by mouth 2 times daily, Disp: 60 tablet, Rfl: 1    loperamide (IMODIUM) 2 MG capsule, Take 2 capsules (4 mg) by mouth 4 times daily (before meals and nightly) for 30 days, Disp: 240 capsule, Rfl: 0    magic mouthwash suspension, diphenhydrAMINE, lidocaine, aluminum-magnesium & simethicone, (FIRST-MOUTHWASH BLM) compounding kit, Swish and swallow 10 mLs in mouth every 6 hours as needed for mouth sores, Disp: 237 mL, Rfl: 0    medical cannabis (Patient's own supply), See Admin Instructions (The purpose of this order is to document that the patient reports taking medical cannabis.  This is not a prescription, and is not used to certify that the patient has a qualifying medical condition.), Disp: , Rfl:     metFORMIN (GLUCOPHAGE) 500 MG tablet, TAKE 2 TABLETS BY MOUTH 2 TIMES DAILY WITH MEALS (Patient taking differently: Take 500 mg by mouth 2 times daily (with meals) TAKE 2 TABLETS BY MOUTH 2 TIMES DAILY WITH MEALS), Disp: 360 tablet, Rfl: 1    methocarbamol (ROBAXIN) 750 MG tablet, Take 1 tablet (750 mg) by mouth 4 times daily as needed for muscle spasms, Disp: 40 tablet, Rfl: 0    metroNIDAZOLE (METROGEL) 0.75 % vaginal gel, Place 1 applicator (5 g) vaginally daily for 7 days Patient picking up today, Disp: 35 g, Rfl: 0    ondansetron (ZOFRAN ODT) 4 MG ODT tab, Take 1 tablet (4 mg) by mouth every 8 hours as needed for nausea or vomiting, Disp: 10 tablet, Rfl: 1    Ostomy Supplies MISC, 20 each daily, Disp: 20 each, Rfl: 11    psyllium (METAMUCIL/KONSYL) Packet, Take 1 packet by mouth 3 times daily for 30 days, Disp: 90 packet, Rfl: 0    triamcinolone (KENALOG) 0.1 % paste, Take by mouth 2 times daily, Disp: 5 g, Rfl: 0    venlafaxine (EFFEXOR) 75 MG tablet, TAKE TWO TABLETS BY MOUTH TWICE A DAY, Disp: 360 tablet, Rfl:  "0  No current facility-administered medications for this visit.    Facility-Administered Medications Ordered in Other Visits:     lidocaine 1 % 9 mL, 9 mL, Intradermal, Once, Anna Naidu MD    sodium bicarbonate 8.4 % injection 1 mEq, 1 mEq, Intradermal, Once, Anna Naidu MD    SOCIAL HISTORY:  Social History     Socioeconomic History    Marital status:      Spouse name: Not on file    Number of children: Not on file    Years of education: Not on file    Highest education level: Not on file   Occupational History    Not on file   Tobacco Use    Smoking status: Former     Packs/day: 1.00     Years: 5.00     Additional pack years: 0.00     Total pack years: 5.00     Types: Cigarettes     Quit date: 3/20/1998     Years since quittin.6    Smokeless tobacco: Never   Vaping Use    Vaping Use: Never used   Substance and Sexual Activity    Alcohol use: Not Currently    Drug use: Yes     Types: Marijuana     Comment: vape, edible    Sexual activity: Not Currently     Partners: Male     Birth control/protection: Surgical   Other Topics Concern    Parent/sibling w/ CABG, MI or angioplasty before 65F 55M? No   Social History Narrative    20 y.o- patient's mother   of lung cancer. She had to take care of her younger sister.    2012- patient's  had a heart attack with stents placed, followed by cardiac rehabilitation    2000 TO 2012  was in [facility] for depression    2013 patient's  went through alcohol rehabilitation at [facility] inpatient            They have attended couple counseling a couple of times and patient went to the family program for chemical dependency.    Patient denies alcohol or drug use and herself            Has 2 children, girls ages 17 and 14. Oldest has been a \"trouble maker.\"     For a while she was working 3 jobs since her  was ill. 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 Reports " her job is very stressful.         Social Determinants of Health     Financial Resource Strain: High Risk (6/28/2021)    Overall Financial Resource Strain (CARDIA)     Difficulty of Paying Living Expenses: Very hard   Food Insecurity: No Food Insecurity (6/28/2021)    Hunger Vital Sign     Worried About Running Out of Food in the Last Year: Never true     Ran Out of Food in the Last Year: Never true   Transportation Needs: No Transportation Needs (6/28/2021)    PRAPARE - Transportation     Lack of Transportation (Medical): No     Lack of Transportation (Non-Medical): No   Physical Activity: Not on file   Stress: Stress Concern Present (6/25/2021)    [employer] - Occupational Stress Questionnaire     Feeling of Stress : Very much   Social Connections: Unknown (6/25/2021)    Social Connection and Isolation Panel [NHANES]     Frequency of Communication with Friends and Family: Not asked     Frequency of Social Gatherings with Friends and Family: Not asked     Attends [employer] Services: Not asked     Active Member of Clubs or Organizations: Not asked     Attends Club or Organization Meetings: Not asked     Marital Status:    Interpersonal Safety: Unknown (6/25/2021)    Humiliation, Afraid, Rape, and Kick questionnaire     Fear of Current or Ex-Partner: Not asked     Emotionally Abused: Not asked     Physically Abused: Not asked     Sexually Abused: Not asked   Housing Stability: Not on file       FAMILY HISTORY:  Father with history of colon polyps.  Paternal grandmother with history of colon cancer in her 50s or 60s.  Father did also history of diverticulitis as well as IBS.  Her child has been diagnosed with IBS.  No history of Crohn's disease.  No history of ulcerative colitis.   Family History   Problem Relation Age of Onset    Cancer Mother 45        lung    Neurologic Disorder Mother         epilepsy    Lipids Father     Gastrointestinal Disease Father         diverticulitis      Depression Father     Colitis Father     Colon Cancer Father     Diverticulitis Father     Depression Sister     Cancer Maternal Grandmother     Blood Disease Maternal Grandmother         lymphoma     Arthritis Maternal Grandmother     Diabetes Maternal Grandmother     Depression Maternal Grandmother     Macular Degeneration Maternal Grandmother     Glaucoma Maternal Grandmother     Diabetes Maternal Grandfather     Cerebrovascular Disease Maternal Grandfather     Blood Disease Maternal Grandfather     Heart Disease Maternal Grandfather     Glaucoma Maternal Grandfather     Cancer Paternal Grandmother     Cancer - colorectal Paternal Grandmother     Colitis Paternal Grandmother     Colon Cancer Paternal Grandmother     Diverticulitis Paternal Grandmother     Respiratory Paternal Grandfather         emphysema     Colitis Paternal Grandfather     Colon Cancer Paternal Grandfather     Diverticulitis Paternal Grandfather     Heart Disease Daughter     Asthma Daughter     Melanoma No family hx of     Anesthesia Reaction No family hx of     Clotting Disorder No family hx of        Past/family/social history reviewed and no changes          Again, thank you for allowing me to participate in the care of your patient.      Sincerely,    Beti Issa PA-C

## 2023-11-17 NOTE — PROGRESS NOTES
Infusion Nursing Note:  Doretha Yu presents today for IVF.    Patient seen by provider today: No   present during visit today: Not Applicable.    Note: Pt reports having problems with nausea, dizziness and lightheaded. VSS. Order placed for Zofran ODT because pt doesn't have a pill with her to take for her nausea.      Intravenous Access:  Peripheral IV placed. SL was intact from previous infusion. Flushes well with no redness, pain or swelling. No blood return noted but pt states this      Treatment Conditions:  Not Applicable.      Post Infusion Assessment:  Patient tolerated infusion without incident.  Blood return noted pre and post infusion.  Site patent and intact, free from redness, edema or discomfort.  No evidence of extravasations.  Access discontinued per protocol.       Discharge Plan:   Discharge instructions reviewed with: Patient.  Patient and/or family verbalized understanding of discharge instructions and all questions answered.  Patient discharged in stable condition accompanied by: self.  Departure Mode: Ambulatory.      Chelsea Rodriguez RN

## 2023-11-17 NOTE — NURSING NOTE
Is the patient currently in the state of [place]? YES    Visit mode:VIDEO    If the visit is dropped, the patient can be reconnected by: VIDEO VISIT: Text to cell phone:   Telephone Information:   Mobile [phone]       Will anyone else be joining the visit? NO  (If patient encounters technical issues they should call [phone]    How would you like to obtain your AVS? MyChart    Are changes needed to the allergy or medication list? No    Reason for visit: RECHECK    Edward MANE

## 2023-11-17 NOTE — PROGRESS NOTES
IBD CLINIC VISIT     Telephone Call:   Total Time 43 minutes      CC/REFERRING MD:  No ref. provider found  REASON FOR CONSULTATION: high ilestomy output, elevated LFTs    ASSESSMENT/PLAN    48 year old female with history prothrombin deficiency, CVA in 2004 after giving birth, asthma,  chronic pain, depression, anxiety,  melanoma and nivolumab induced UC now s/p total abdominal colectomy and end ileostomy on 6/15/2021 with Dr. Ram and robotic completion proctectomy with creation of J-pouch with IPAA, takedown of end ileostomy, and diverting loop ileostomy 9/5/23. Following up in clinic due to high ileostomy output and elevated LFTs.    1. 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 Surgical path consistent with moderate disease. She continues to follow closely with colorectal surgery at this time.  Since second surgery has had increased outputs, worse over past 3-4 weeks with estimated 7600-9121 cc out daily.  Work-up with infectious stool studies including enteric panel and C. difficile have been negative, inflammatory markers have been elevated however fecal calprotectin normal and CT enterography done which did not show any evidence of bowel inflammation.  It is not fully clear what is driving her high output.  Fiber supplement has thickened stool some, we will plan to continue this.  Also will continue max dose of loperamide at 2 tabs 4 times daily and Lomotil max dose at 2 tabs 4 times daily.  Possible component of bile acid malabsorption contributing to loose stools, will try addition of cholestyramine at 1 packet twice daily to start.  This can be further increased if she finds is beneficial.  To help maintain hydration she should also continue outpatient IV fluids, this is currently 3 days a week, pending  hydration status may benefit from increased frequency.    She is drinking significant amounts of water, often plain water, diluted Pedialyte, Gatorade or liquid IV.  She does sometimes drink significant amounts at once.  Possible dietary and fluid choices and patterns driving more output.  I think she would benefit from meeting with dietitian to further discuss.  Recommended oral rehydration solutions with full Pedialyte or drip drop and taking frequent sips rather than jugs.  Additionally recommended avoidance of sugars and sugar alcohols.    If high output continues despite cholestyramine and nutritional interventions could consider EGD and ileoscopy for further assessment.   It would be beneficial to have her stools better controlled prior to surgery as suspect she would continue to have high output from her J-pouch after connection.    -- Continue IV Fluids 3 days per week  -- Continue loperamide 2 tabs four times per day  -- Continue lomotil 2 tabs four times per day  -- Continue metamucil, 1 Tbs three times per day  - Continue pantoprazole 40 mg BID   -- We can try cholestyramine to see if this helps slow output, try 1 packet twice daily  -- Utilize Pedialyte or Drip Drop for oral rehydration, try to take frequent sips, avoid chugging  -- Meet with dietitian, hopefully next week to discuss diet and fluid recommendations to assist with ostomy outputs      2. Elevated LFTs  Elevation of Alkaline Phosphatase, ALT and AST starting on draw 7/11/2023. Reassuringly, labs have trended down and ALT and AST have normalized within last week, most recent Alk Phos 155 on 11/13/23. Work up with hepatitis serologies, ISAEL, antimitochondrial antibody, alpha-1, celiac serologies, SPEP, F-actin have been negative. Of note patient has history of fatty liver and biliary dyskinesia (7% EF on HIDA). Patient reported significant tylenol use with 4000 mg daily since surgery 9/5/2023 until last 1-2 weeks.  We will continue to trends  LFTs and fraction Alk Phos with persistent elevation. Will also proceed with MRCP for further evaluation.  -- Limit Tylenol use  -- Trend LFTs  -- Fractionate Alk Phos  -- Schedule MRCP       Return to clinic in 4 weeks    Thank you for this consultation.  It was a pleasure to participate in the care of this patient; please contact us with any further questions.  I spent a total of 70 minutes, face to face, was spent with this patient, >50% of which was counseling regarding the above delineated issues.    This note was created with voice recognition software, and while reviewed for accuracy, typos may remain.     Beti Issa PA-C  Inflammatory Bowel Disease Program   Division of Gastroenterology, Hepatology and Nutrition  [facility]        IBD HISTORY  Age at diagnosis: 2021  Extent of disease: proctosigmoid   Current UC medications: None  Prior UC surgeries:  total abdominal colectomy and end ileostomy 6/15/21   Prior IBD Medications:   Infliximab, 1 dose in the hospital good response  Vedolizumab, completed induction dosing  Prednisone, minimal response to high-dose oral and IV with significant weight gain  5-ASA, no response  Topical therapies to include enemas, no response    DRUG MONITORING  TPMT enzyme activity:     6-TGN/6-MMPN levels:    Biologic concentration:       DISEASE ASSESSMENT  Endoscopic assessment:   EGD 2/14/23 ([place])  Post-op Diagnoses:        - LA Grade A reflux esophagitis.        - Erythematous mucosa in the prepyloric region of the stomach. Biopsied.        - A few fundic gland polyps.        - A single non-bleeding angioectasia in the duodenum (posterior wall).        - Biopsies were taken with a cold forceps for evaluation of celiac        disease.        - No clear cause of abdominal pain identified. No evidence of recurrent        melanoma.     Surgical Path:  Colon path 6/15/23:  FINAL DIAGNOSIS:   COLON, RESECTION:   - Moderate chronic active colitis with crypt abscess  formation ([place]   grade 3)   - Negative for dysplasia and malignancy   - Viable surgical margins   - Two benign lymph nodes (0/2)     Surgical Pathology (9/5/2023):  A. ILEUM, ILEOSTOMY TRIM;  Ileo-cutaneous anastomotic junction with nonspecific mucosa inflammation, congestion, and other features consistent with ileostomy; no dysplasia or malignancy  B. RECTUM, COMPLETION PROCTECTOMY:  Mildly active chronic proctitis with:   -Neutrophilic cryptitis, mucosal atrophy and prominent reactive lymphoid hyperplasias  -Reactive mesorectal lymph nodes;  no dysplasia or malignancy  C. COLON, ANASTOMOTIC RINGS; EXCISION:   -Portions of small intestinal mucosa and wall with no morphologic abnormalities   -Portion of rectal mucosa and wall with chronic proctitis; no dysplasia or malignancy     Enterography:   -- MRE 12/27/2022 no findings for acute inflammation of the bowel    Fecal calprotectin: --    C diff: negative 10/26/23, negative 1/12/23,  was positive 3/2021 treated with vanco taper (at [facility])     HPI:   Doretha Yu is a 48 year old female with significant PMH of melanoma unknown primary (2017), s/p left axillary LN dissection (2017, Dr. Cool) in remission, history of adjuvant nivolumab induced colitis in 2018 treated with remicade (x1 at [facility]) and Entyvio, h/o c.diff (recent bout, started PO vanc 4/7), seronegative RA on prednisone & tocilizumab, steroid induced DM, history of cushings, chronic pain, with diagnosis of UC in 2021, received Entyvio x2 mostly steroid dependent x3 years.  She had multiple readmissions for hematochezia, acute on chronic abdominal & rectal pain, nausea for refractory UC and concerns regarding risks associated with melanoma recurrence with using standard UC maintenance medications.  She underwent total abdominal colectomy and end ileostomy on 6/15/2021 with Dr. Ram. She is now s/p robotic completion proctectomy with creation of J-pouch with IPAA, takedown of end ileostomy, and  diverting loop ileostomy 9/5/23. Plan for ileostomy closure 12/27/23.    Ileostomy output has been increased in surgery, however with worsening in past 3 to 4 weeks.     After our last visit she presented to the emergency room as was recommended given new onset fever and abdominal pain and reports of extreme weakness with ongoing high ileostomy output.  She was hospitalized 10/30-23-11/8/23.  CTE completed which was negative for any intra-abdominal process, fistula, abscess or bowel inflammation.  It did incidentally note pneumonia for which she was treated with 5 days of antibiotics.  Stool studies completed with normal fecal calprotectin negative enteric panel and negative C. difficile from both ostomy and J-pouch.  Adjustments made to bowel regimen including maximizing loperamide at 2 tablets 4 times daily, i restarting Lomotil, starting fiber 3 times a day, and starting pantoprazole 40 mg twice daily.  She notes that she does not feel her ostomy output slowed much during the hospital stay.    Today she reports that she continues to have high output, 4644-6432 ml daily. Liquid brown/yellow stool. No blood. Continues to feel dehydrated with dry mouth, darker urine, fatigue, weakness, dizziness.  Feels better on days when she gets her outpatient IV fluids but still tired and feels like most daily activities are an effort.  Currently getting fluids at outpatient infusion center 3 days a week Monday Wednesday Friday.  Given report of ongoing dizziness Lomotil was increased to 2 tablets 4 times daily just yesterday.    Overall she reports very minimal impact with medications on her ostomy output.  The only thing she thinks has made a difference is fiber which thickens stool for about 1 to 2 hours after taking then turns back to liquid.    Reports she is drinking 7x16 ounce water bottles per day and 1 bottle of Pedialyte.  Occasional Gatorade.  Mixes plain water with Pedialyte, sometimes mixes with liquid IV.   Sometimes drinks significant amount of water at once.    Reports that she continues to feel nauseated, taking Zofran 1 tab every 6 hours.  Reports this helps just a little, more benefit from prior 8 mg dose.  Reports she is eating minimally.  No vomiting.  Sometimes dry heaving.    Notes weight has been trending down.  It was 208 pounds in September now 184 pounds.      sIBDQ:  IBDQ Score Date IBDQ - Total Score  (Higher score better)   4/10/2018   7:20 AM 32          ROS:    Constitutional, HEENT, cardiovascular, pulmonary, GI, , musculoskeletal, neuro, skin, endocrine and psych systems are negative, except as otherwise noted.    PHYSICAL EXAMINATION:  Constitutional: aaox3, cooperative, pleasant, not dyspneic/diaphoretic, no acute distress  Vitals reviewed: There were no vitals taken for this visit.  Wt:   Wt Readings from Last 2 Encounters:   11/07/23 88.6 kg (195 lb 5.2 oz)   10/26/23 84.8 kg (187 lb)      General appearance: Healthy appearing adult, in no acute distress  Eyes: Sclera anicteric, Pupils round and reactive to light  Ears, nose, mouth and throat: No obvious external lesions of ears and nose, Hearing intact  Neck: Symmetric, No obvious external lesions  Respiratory: Normal respiration, no use of accessory muscles   MSK: Gait normal  Skin: No rashes or jaundice   Psychiatric: Oriented to person, place and time, Appropriate mood and affect.       PERTINENT PAST MEDICAL HISTORY:  Past Medical History:   Diagnosis Date    Abnormal MRI     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     Anxiety     Basal cell carcinoma     Cervical high risk HPV (human papillomavirus) test positive 12/13/2019    See problem list    Colitis     Depression     Diabetes mellitus, iatrogenic (H) 01/28/2020    Esophageal reflux     Inflammatory arthritis     Insomnia     Intestinal giardiasis 03/05/2018    Lumbago     left lower back pain    Lymphedema     Malignant melanoma (H)     Melanoma (H) 10/23/2017     Migraines     Mild persistent asthma     Morbid obesity with BMI of 40.0-44.9, adult (H)     STORMY (obstructive sleep apnea)     Prothrombin deficiency (H)     takes 81mg asa daily    Stroke (cerebrum) (H)     During     TIA (transient ischemic attack)     Type 2 diabetes mellitus (H)     Ulcerative pancolitis (H)        PREVIOUS SURGERIES:  Past Surgical History:   Procedure Laterality Date    APPENDECTOMY      COLONOSCOPY N/A 10/18/2017    Procedure: COLONOSCOPY;  Colon;  Surgeon: Debbie Stephens MD;  [facility]    COLONOSCOPY N/A 2018    Procedure: COMBINED COLONOSCOPY, SINGLE OR MULTIPLE BIOPSY/POLYPECTOMY BY BIOPSY;  colon;  Surgeon: Benita Schumacher MD;  Location: [facility]    COLONOSCOPY      multiple since  to present - about 6 total    DAVINCI ASSISTED TRANSANAL TOTAL MESORECTAL EXCISION N/A 2023    Procedure: COMPLETION PROCTECTOMY, ROBOT-ASSISTED, ILEAL POUCH ANASTAMOSIS;  Surgeon: Quinton Ram MD;  [facility]    DISSECT LYMPH NODE AXILLA Left 10/23/2017    Procedure: DISSECT LYMPH NODE AXILLA;  Left Axillary Lymph Node Dissection ;  Surgeon: Laurent Cool MD;  [facility]    EXAM UNDER ANESTHESIA PELVIC N/A 2020    Procedure: EXAM UNDER ANESTHESIA, PELVIS; with Cervical Biopsies, Vaginal Biopsy and Endocervical Curettings;  Surgeon: Melina Jung MD;  Location: [facility]    GYN SURGERY  ,         LAPAROSCOPIC ASSISTED COLECTOMY N/A 06/15/2021    Procedure: laparoscopic total abdominal colectomy, end ileostomy;  Surgeon: Quinton Ram MD;  [facility]    REPAIR MOHS Left 2017    Procedure: REPAIR MOHS;  Left Upper Lid Moh's Reconstruction;  Surgeon: Kisha Bosch MD;  [facility]    SIGMOIDOSCOPY FLEXIBLE N/A 2023    Procedure: Sigmoidoscopy flexible;  Surgeon: Quinton Ram MD;  Location: [facility]       ALLERGIES:     Allergies   Allergen Reactions    Bee Venom Swelling     Azithromycin Diarrhea    Erythromycin      Other reaction(s): GI intolerance, Vomiting    Fentanyl Other (See Comments)     sweating  sweating    Prochlorperazine Fatigue     Other reaction(s): Other (see comments)  Fatigue    Buspirone      Other reaction(s): GI intolerance  vomiting    Erythrocin Nausea and Vomiting    Gluten Meal      Celiac disease    Topamax [Topiramate]      Made her lethargic    Zithromax [Azithromycin Dihydrate] Diarrhea    Enbrel [Etanercept] Hives and Rash       PERTINENT MEDICATIONS:    Current Outpatient Medications:     acetaminophen (TYLENOL) 500 MG tablet, Take 1,000 mg by mouth every 6 hours as needed for mild pain, Disp: , Rfl:     albuterol (PROAIR HFA/PROVENTIL HFA/VENTOLIN HFA) 108 (90 Base) MCG/ACT inhaler, Inhale 2 puffs into the lungs every 4 hours as needed for shortness of breath / dyspnea, Disp: 18 g, Rfl: 1    ARIPiprazole (ABILIFY) 2 MG tablet, Take 1 tablet (2 mg) by mouth daily for 30 days, Disp: 30 tablet, Rfl: 0    artificial saliva (BIOTENE MT) SOLN solution, Swish and spit 2 mLs (2 sprays) in mouth 4 times daily, Disp: 44.3 mL, Rfl: 0    benzocaine-menthol (CHLORASEPTIC MAX) 15-10 MG lozenge, Place 1 lozenge inside cheek every hour as needed, Disp: 15 lozenge, Rfl: 0    Cyanocobalamin (B-12 PO), Take 1 tablet by mouth daily, Disp: , Rfl:     diphenoxylate-atropine (LOMOTIL) 2.5-0.025 MG tablet, Take 1 tablet by mouth 2 times daily (before meals) for 30 days, Disp: 60 tablet, Rfl: 0    famotidine (PEPCID) 20 MG tablet, Take 1 tablet (20 mg) by mouth 2 times daily for 10 days, Disp: 20 tablet, Rfl: 0    famotidine (PEPCID) 20 MG tablet, Take 1 tablet (20 mg) by mouth 2 times daily, Disp: 60 tablet, Rfl: 0    guaiFENesin-dextromethorphan (ROBITUSSIN DM) 100-10 MG/5ML syrup, Take 10 mLs by mouth every 4 hours as needed for cough, Disp: 236 mL, Rfl: 0    hydrOXYzine (ATARAX) 25 MG tablet, Take 1 tablet (25 mg) by mouth 2 times daily, Disp: 60 tablet, Rfl: 1    loperamide  (IMODIUM) 2 MG capsule, Take 2 capsules (4 mg) by mouth 4 times daily (before meals and nightly) for 30 days, Disp: 240 capsule, Rfl: 0    magic mouthwash suspension, diphenhydrAMINE, lidocaine, aluminum-magnesium & simethicone, (FIRST-MOUTHWASH BLM) compounding kit, Swish and swallow 10 mLs in mouth every 6 hours as needed for mouth sores, Disp: 237 mL, Rfl: 0    medical cannabis (Patient's own supply), See Admin Instructions (The purpose of this order is to document that the patient reports taking medical cannabis.  This is not a prescription, and is not used to certify that the patient has a qualifying medical condition.), Disp: , Rfl:     metFORMIN (GLUCOPHAGE) 500 MG tablet, TAKE 2 TABLETS BY MOUTH 2 TIMES DAILY WITH MEALS (Patient taking differently: Take 500 mg by mouth 2 times daily (with meals) TAKE 2 TABLETS BY MOUTH 2 TIMES DAILY WITH MEALS), Disp: 360 tablet, Rfl: 1    methocarbamol (ROBAXIN) 750 MG tablet, Take 1 tablet (750 mg) by mouth 4 times daily as needed for muscle spasms, Disp: 40 tablet, Rfl: 0    metroNIDAZOLE (METROGEL) 0.75 % vaginal gel, Place 1 applicator (5 g) vaginally daily for 7 days Patient picking up today, Disp: 35 g, Rfl: 0    ondansetron (ZOFRAN ODT) 4 MG ODT tab, Take 1 tablet (4 mg) by mouth every 8 hours as needed for nausea or vomiting, Disp: 10 tablet, Rfl: 1    Ostomy Supplies MISC, 20 each daily, Disp: 20 each, Rfl: 11    psyllium (METAMUCIL/KONSYL) Packet, Take 1 packet by mouth 3 times daily for 30 days, Disp: 90 packet, Rfl: 0    triamcinolone (KENALOG) 0.1 % paste, Take by mouth 2 times daily, Disp: 5 g, Rfl: 0    venlafaxine (EFFEXOR) 75 MG tablet, TAKE TWO TABLETS BY MOUTH TWICE A DAY, Disp: 360 tablet, Rfl: 0  No current facility-administered medications for this visit.    Facility-Administered Medications Ordered in Other Visits:     lidocaine 1 % 9 mL, 9 mL, Intradermal, Once, Anna Naidu MD    sodium bicarbonate 8.4 % injection 1 mEq, 1 mEq, Intradermal,  "Once, Anna Naidu MD    SOCIAL HISTORY:  Social History     Socioeconomic History    Marital status:      Spouse name: Not on file    Number of children: Not on file    Years of education: Not on file    Highest education level: Not on file   Occupational History    Not on file   Tobacco Use    Smoking status: Former     Packs/day: 1.00     Years: 5.00     Additional pack years: 0.00     Total pack years: 5.00     Types: Cigarettes     Quit date: 3/20/1998     Years since quittin.6    Smokeless tobacco: Never   Vaping Use    Vaping Use: Never used   Substance and Sexual Activity    Alcohol use: Not Currently    Drug use: Yes     Types: Marijuana     Comment: vape, edible    Sexual activity: Not Currently     Partners: Male     Birth control/protection: Surgical   Other Topics Concern    Parent/sibling w/ CABG, MI or angioplasty before 65F 55M? No   Social History Narrative    20 y.o- patient's mother   of lung cancer. She had to take care of her younger sister.    2012- patient's  had a heart attack with stents placed, followed by cardiac rehabilitation    2000 TO 2012  was in [facility] for depression    2013 patient's  went through alcohol rehabilitation at [facility] inpatient            They have attended couple counseling a couple of times and patient went to the family program for chemical dependency.    Patient denies alcohol or drug use and herself            Has 2 children, girls ages 17 and 14. Oldest has been a \"trouble maker.\"     For a while she was working 3 jobs since her  was ill. Works at the [employer] for [employer]. Reports her job is very stressful.         Social Determinants of Health     Financial Resource Strain: High Risk (2021)    Overall Financial Resource Strain (CARDIA)     Difficulty of Paying Living Expenses: Very hard   Food Insecurity: No Food Insecurity " (6/28/2021)    Hunger Vital Sign     Worried About Running Out of Food in the Last Year: Never true     Ran Out of Food in the Last Year: Never true   Transportation Needs: No Transportation Needs (6/28/2021)    PRAPARE - Transportation     Lack of Transportation (Medical): No     Lack of Transportation (Non-Medical): No   Physical Activity: Not on file   Stress: Stress Concern Present (6/25/2021)    [employer] - Occupational Stress Questionnaire     Feeling of Stress : Very much   Social Connections: Unknown (6/25/2021)    Social Connection and Isolation Panel [NHANES]     Frequency of Communication with Friends and Family: Not asked     Frequency of Social Gatherings with Friends and Family: Not asked     Attends Rastafarian Services: Not asked     Active Member of Clubs or Organizations: Not asked     Attends Club or Organization Meetings: Not asked     Marital Status:    Interpersonal Safety: Unknown (6/25/2021)    Humiliation, Afraid, Rape, and Kick questionnaire     Fear of Current or Ex-Partner: Not asked     Emotionally Abused: Not asked     Physically Abused: Not asked     Sexually Abused: Not asked   Housing Stability: Not on file       FAMILY HISTORY:  Father with history of colon polyps.  Paternal grandmother with history of colon cancer in her 50s or 60s.  Father did also history of diverticulitis as well as IBS.  Her child has been diagnosed with IBS.  No history of Crohn's disease.  No history of ulcerative colitis.   Family History   Problem Relation Age of Onset    Cancer Mother 45        lung    Neurologic Disorder Mother         epilepsy    Lipids Father     Gastrointestinal Disease Father         diverticulitis     Depression Father     Colitis Father     Colon Cancer Father     Diverticulitis Father     Depression Sister     Cancer Maternal Grandmother     Blood Disease Maternal Grandmother         lymphoma     Arthritis Maternal Grandmother     Diabetes  Maternal Grandmother     Depression Maternal Grandmother     Macular Degeneration Maternal Grandmother     Glaucoma Maternal Grandmother     Diabetes Maternal Grandfather     Cerebrovascular Disease Maternal Grandfather     Blood Disease Maternal Grandfather     Heart Disease Maternal Grandfather     Glaucoma Maternal Grandfather     Cancer Paternal Grandmother     Cancer - colorectal Paternal Grandmother     Colitis Paternal Grandmother     Colon Cancer Paternal Grandmother     Diverticulitis Paternal Grandmother     Respiratory Paternal Grandfather         emphysema     Colitis Paternal Grandfather     Colon Cancer Paternal Grandfather     Diverticulitis Paternal Grandfather     Heart Disease Daughter     Asthma Daughter     Melanoma No family hx of     Anesthesia Reaction No family hx of     Clotting Disorder No family hx of        Past/family/social history reviewed and no changes

## 2023-11-20 ENCOUNTER — TELEPHONE (OUTPATIENT)
Dept: GASTROENTEROLOGY | Facility: CLINIC | Age: 48
End: 2023-11-20
Payer: COMMERCIAL

## 2023-11-20 NOTE — TELEPHONE ENCOUNTER
Health Call Center    Phone Message    May a detailed message be left on voicemail: yes     Reason for Call: Other: Pateint called in to schedule appointment with Dr Davey, as suggesnt but his first opring in  07/02/2024. Patient requesting to speak to Beti about how to proceed with what she needs. Please call patient back ASAP.     Action Taken: Message routed to:  [facility] Gastro    Travel Screening: Not Applicable

## 2023-11-21 ENCOUNTER — INFUSION THERAPY VISIT (OUTPATIENT)
Dept: INFUSION THERAPY | Facility: CLINIC | Age: 48
End: 2023-11-21
Attending: FAMILY MEDICINE
Payer: COMMERCIAL

## 2023-11-21 ENCOUNTER — VIRTUAL VISIT (OUTPATIENT)
Dept: GASTROENTEROLOGY | Facility: CLINIC | Age: 48
End: 2023-11-21
Attending: DIETITIAN, REGISTERED
Payer: COMMERCIAL

## 2023-11-21 ENCOUNTER — LAB (OUTPATIENT)
Dept: INFUSION THERAPY | Facility: CLINIC | Age: 48
End: 2023-11-21
Attending: PHYSICIAN ASSISTANT
Payer: COMMERCIAL

## 2023-11-21 ENCOUNTER — TELEPHONE (OUTPATIENT)
Dept: GASTROENTEROLOGY | Facility: CLINIC | Age: 48
End: 2023-11-21

## 2023-11-21 VITALS — HEIGHT: 63 IN | WEIGHT: 182 LBS | BODY MASS INDEX: 32.25 KG/M2

## 2023-11-21 VITALS — RESPIRATION RATE: 16 BRPM | DIASTOLIC BLOOD PRESSURE: 72 MMHG | SYSTOLIC BLOOD PRESSURE: 106 MMHG | HEART RATE: 98 BPM

## 2023-11-21 DIAGNOSIS — R19.8 HIGH OUTPUT ILEOSTOMY (H): Primary | ICD-10-CM

## 2023-11-21 DIAGNOSIS — R19.8 HIGH OUTPUT ILEOSTOMY (H): ICD-10-CM

## 2023-11-21 DIAGNOSIS — Z93.2 HIGH OUTPUT ILEOSTOMY (H): ICD-10-CM

## 2023-11-21 DIAGNOSIS — Z93.2 HIGH OUTPUT ILEOSTOMY (H): Primary | ICD-10-CM

## 2023-11-21 DIAGNOSIS — K51.90 ULCERATIVE COLITIS WITHOUT COMPLICATIONS, UNSPECIFIED LOCATION (H): ICD-10-CM

## 2023-11-21 DIAGNOSIS — K51.919 ULCERATIVE COLITIS WITH COMPLICATION, UNSPECIFIED LOCATION (H): ICD-10-CM

## 2023-11-21 LAB
ANION GAP SERPL CALCULATED.3IONS-SCNC: 16 MMOL/L (ref 7–15)
BUN SERPL-MCNC: 13.1 MG/DL (ref 6–20)
CALCIUM SERPL-MCNC: 10.7 MG/DL (ref 8.6–10)
CHLORIDE SERPL-SCNC: 97 MMOL/L (ref 98–107)
CREAT SERPL-MCNC: 0.79 MG/DL (ref 0.51–0.95)
DEPRECATED HCO3 PLAS-SCNC: 20 MMOL/L (ref 22–29)
EGFRCR SERPLBLD CKD-EPI 2021: >90 ML/MIN/1.73M2
GLUCOSE SERPL-MCNC: 215 MG/DL (ref 70–99)
MAGNESIUM SERPL-MCNC: 1.6 MG/DL (ref 1.7–2.3)
POTASSIUM SERPL-SCNC: 4.3 MMOL/L (ref 3.4–5.3)
SODIUM SERPL-SCNC: 133 MMOL/L (ref 135–145)

## 2023-11-21 PROCEDURE — 250N000011 HC RX IP 250 OP 636: Mod: JZ | Performed by: PHYSICIAN ASSISTANT

## 2023-11-21 PROCEDURE — 258N000003 HC RX IP 258 OP 636

## 2023-11-21 PROCEDURE — 96365 THER/PROPH/DIAG IV INF INIT: CPT

## 2023-11-21 PROCEDURE — 97802 MEDICAL NUTRITION INDIV IN: CPT | Mod: 93 | Performed by: DIETITIAN, REGISTERED

## 2023-11-21 PROCEDURE — 80048 BASIC METABOLIC PNL TOTAL CA: CPT | Performed by: PHYSICIAN ASSISTANT

## 2023-11-21 PROCEDURE — 250N000009 HC RX 250: Performed by: PHYSICIAN ASSISTANT

## 2023-11-21 PROCEDURE — 99207 PR NO CHARGE LOS: CPT | Mod: 93 | Performed by: DIETITIAN, REGISTERED

## 2023-11-21 PROCEDURE — 36415 COLL VENOUS BLD VENIPUNCTURE: CPT

## 2023-11-21 PROCEDURE — 83735 ASSAY OF MAGNESIUM: CPT | Performed by: PHYSICIAN ASSISTANT

## 2023-11-21 RX ORDER — ALBUTEROL SULFATE 0.83 MG/ML
2.5 SOLUTION RESPIRATORY (INHALATION)
Status: CANCELLED | OUTPATIENT
Start: 2023-11-21

## 2023-11-21 RX ORDER — ONDANSETRON 2 MG/ML
4 INJECTION INTRAMUSCULAR; INTRAVENOUS EVERY 6 HOURS PRN
Status: CANCELLED
Start: 2023-11-21

## 2023-11-21 RX ORDER — DIPHENHYDRAMINE HYDROCHLORIDE 50 MG/ML
50 INJECTION INTRAMUSCULAR; INTRAVENOUS
Status: CANCELLED
Start: 2023-11-21

## 2023-11-21 RX ORDER — LIDOCAINE HYDROCHLORIDE 10 MG/ML
1 INJECTION, SOLUTION EPIDURAL; INFILTRATION; INTRACAUDAL; PERINEURAL ONCE
Status: COMPLETED | OUTPATIENT
Start: 2023-11-21 | End: 2023-11-21

## 2023-11-21 RX ORDER — HEPARIN SODIUM (PORCINE) LOCK FLUSH IV SOLN 100 UNIT/ML 100 UNIT/ML
5 SOLUTION INTRAVENOUS
Status: CANCELLED | OUTPATIENT
Start: 2023-11-21

## 2023-11-21 RX ORDER — ALBUTEROL SULFATE 90 UG/1
1-2 AEROSOL, METERED RESPIRATORY (INHALATION)
Status: CANCELLED
Start: 2023-11-21

## 2023-11-21 RX ORDER — MAGNESIUM SULFATE HEPTAHYDRATE 40 MG/ML
2 INJECTION, SOLUTION INTRAVENOUS ONCE
Status: COMPLETED | OUTPATIENT
Start: 2023-11-21 | End: 2023-11-21

## 2023-11-21 RX ORDER — HEPARIN SODIUM,PORCINE 10 UNIT/ML
5-20 VIAL (ML) INTRAVENOUS DAILY PRN
Status: CANCELLED | OUTPATIENT
Start: 2023-11-21

## 2023-11-21 RX ORDER — EPINEPHRINE 1 MG/ML
0.3 INJECTION, SOLUTION, CONCENTRATE INTRAVENOUS EVERY 5 MIN PRN
Status: CANCELLED | OUTPATIENT
Start: 2023-11-21

## 2023-11-21 RX ORDER — METHYLPREDNISOLONE SODIUM SUCCINATE 125 MG/2ML
125 INJECTION, POWDER, LYOPHILIZED, FOR SOLUTION INTRAMUSCULAR; INTRAVENOUS
Status: CANCELLED
Start: 2023-11-21

## 2023-11-21 RX ORDER — MEPERIDINE HYDROCHLORIDE 25 MG/ML
25 INJECTION INTRAMUSCULAR; INTRAVENOUS; SUBCUTANEOUS EVERY 30 MIN PRN
Status: CANCELLED | OUTPATIENT
Start: 2023-11-21

## 2023-11-21 RX ORDER — ONDANSETRON 4 MG/1
4 TABLET, ORALLY DISINTEGRATING ORAL EVERY 6 HOURS PRN
Status: DISCONTINUED | OUTPATIENT
Start: 2023-11-21 | End: 2023-11-22 | Stop reason: HOSPADM

## 2023-11-21 RX ORDER — LIDOCAINE HYDROCHLORIDE 10 MG/ML
1 INJECTION, SOLUTION EPIDURAL; INFILTRATION; INTRACAUDAL; PERINEURAL ONCE
Status: DISCONTINUED | OUTPATIENT
Start: 2023-11-21 | End: 2023-11-21 | Stop reason: HOSPADM

## 2023-11-21 RX ADMIN — LIDOCAINE HYDROCHLORIDE 1 ML: 10 INJECTION, SOLUTION EPIDURAL; INFILTRATION; INTRACAUDAL; PERINEURAL at 14:10

## 2023-11-21 RX ADMIN — MAGNESIUM SULFATE HEPTAHYDRATE 2 G: 2 INJECTION, SOLUTION INTRAVENOUS at 15:20

## 2023-11-21 RX ADMIN — SODIUM CHLORIDE 1000 ML: 9 INJECTION, SOLUTION INTRAVENOUS at 14:19

## 2023-11-21 RX ADMIN — ONDANSETRON 4 MG: 4 TABLET, ORALLY DISINTEGRATING ORAL at 14:33

## 2023-11-21 ASSESSMENT — PAIN SCALES - GENERAL: PAINLEVEL: SEVERE PAIN (6)

## 2023-11-21 NOTE — TELEPHONE ENCOUNTER
On 11/15/2023 at 11:42 AM:  Spoke with patient via phone. Completed all scheduling related to surgery with Dr. Quinton Ram scheduled DOS 12/28/2023. Surgery Packet sent.    Cathy Terrazas  Misti-op Coordinator  [place]  Direct Phone: [phone]

## 2023-11-21 NOTE — NURSING NOTE
Is the patient currently in the state of [place]? YES    Visit mode:TELEPHONE    If the visit is dropped, the patient can be reconnected by: TELEPHONE VISIT: Phone number:   Telephone Information:   Mobile [phone]       Will anyone else be joining the visit? NO  (If patient encounters technical issues they should call [phone])    How would you like to obtain your AVS? MyChart    Are changes needed to the allergy or medication list? No    Reason for visit: Consult    Alyssa MANE

## 2023-11-21 NOTE — LETTER
Tatiana Coyne,      We are unable to reach you by phone to help you schedule an appointment with Beit Issa.  Please call [facility] at [phone] to schedule your appointment.         Thank you,    [facility]  [phone]

## 2023-11-21 NOTE — PROGRESS NOTES
Infusion Nursing Note:  Doretha Yu presents today for IVF.    Patient seen by provider today: No   present during visit today: Not Applicable.    Note: Pt is upset we don't have ultrasound in the department. House Sup placed IV by U/S last week and pt is upset HS isn't here today to place her IV. She states she was told he would be. RN apologized for the miscommunication. Agrees to let RN try to plave IV while waiting to see if ED has an ultrasound trained nurse on staff who is able to place IV, it's reported they are in a red surge.     ED supervisor arrived a while later with ultrasound, IV infusion RN has obtained IV placement.    Pt's magnesium is 1.6, she states she is unable to take oral because it causes diarrhea. Pt is requesting IV replacement today.      Intravenous Access:  Peripheral IV placed. IV SL for infusion tomorrow. Blood return noted.    Treatment Conditions:  Not Applicable.      Post Infusion Assessment:  Patient tolerated infusion without incident.  Blood return noted pre and post infusion.  Site patent and intact, free from redness, edema or discomfort.  No evidence of extravasations.       Discharge Plan:   Discharge instructions reviewed with: Patient.  Patient and/or family verbalized understanding of discharge instructions and all questions answered.  Patient discharged in stable condition accompanied by: self.  Departure Mode: Ambulatory.      Chelsea Rodriguez RN

## 2023-11-21 NOTE — PROGRESS NOTES
Virtual Visit Details    Type of service:  Telephone Visit   Phone call duration: 49 minutes     [facility] Outpatient Medical Nutrition Therapy      Time Spent:  49 minutes  Session Type:  Initial   Referring Physician:  Beti Issa PA-C  Reason for RD Visit:   high output ileostomy, ulcerative colitis     Nutrition Assessment:  Patient is a 48 year old female with history that includes celiac, ulcerative colitis, s/p total abdominal colectomy with end ileostomy in 6/15/2021 and robotic completion proctectomy with creation of J pouch with IPAA takedown of ileostomy and now with diverting loop ileostomy on 9/5/2023. She is having high output of stool in her ostomy since her surgery in 9/2023. She reported having 5712-1934 ml output daily. Whenever she eats, she has high output of watery stool. She was drinking a lot of water about 6-7 x 16 oz bottles of water and was drinking some watered down pedialyte and electrolyte drinks bc they were too sweet for her. She is planning to get some drip drop electrolyte drinks and not watering down electrolyte drinks now since meeting with [facility] recently. She is now having some oatmeal for breakfast just recently to try to help bulk up stools. She adds raisins and brown sugar to oatmeal. She has noticed that oatmeal, potato chips seems to help thicken stools lately. Tolerates potatoes and fries and GF pancake helped recently with output as well. Eating white rice, bananas as well to try to help thicken stools. She hasn't found a gluten free bread that she likes yet. Tolerates gluten free crackers. Since her celiac diagnosis earlier in the year, she has not eaten any gluten. Previously stools with old stoma were fine, but since her new diverting ileostomy in September 2023, she is having issues with high output. She has no appetite and less energy to cook and feeling nauseous all the time. Takes some zofran. She also gets IV fluids as well. She stated that she needs to get  "better about eating multiple smaller meals but due to no appetite and less energy to cook and feeling nauseous all the time and d/t eating causing watery high output, she has not been doing this.     Height:   Ht Readings from Last 1 Encounters:   11/21/23 1.6 m (5' 3\")     Weight:   Wt Readings from Last 10 Encounters:   11/21/23 82.6 kg (182 lb)   11/07/23 88.6 kg (195 lb 5.2 oz)   10/26/23 84.8 kg (187 lb)   10/23/23 85.3 kg (188 lb)   10/10/23 87.7 kg (193 lb 4.8 oz)   09/14/23 94.8 kg (209 lb)   09/13/23 93.9 kg (207 lb)   09/05/23 95.2 kg (209 lb 14.1 oz)   08/22/23 93.4 kg (206 lb)   07/11/23 92.5 kg (204 lb)     BMI: Estimated body mass index is 32.24 kg/m  as calculated from the following:    Height as of this encounter: 1.6 m (5' 3\").    Weight as of this encounter: 82.6 kg (182 lb).    Diet Recall:  (some usual/recent meals/snacks/beverages):  Meal Food    Breakfast 10 AM: lately some oatmeal, sometimes yogurt (but currently out)   Lunch skips   Dinner Chicken salad and tortilla chips (broke up into a couple of time)   Snacks PB with raisins on a spoon or celery, PB, raisins or dips banana with PB   Beverages 6-7 x 16 oz bottles Water, IV hydration, rehydrate or gatorade fit or vitamin water, pedialyte. Planning to buy drip drop, drinks fairlife lactose free milk, protein shakes ([employer])   Alcohol Intake none      Labs:    Last Comprehensive Metabolic Panel:  Sodium   Date Value Ref Range Status   11/21/2023 133 (L) 135 - 145 mmol/L Final     Comment:     Reference intervals for this test were updated on 09/26/2023 to more accurately reflect our healthy population. There may be differences in the flagging of prior results with similar values performed with this method. Interpretation of those prior results can be made in the context of the updated reference intervals.    06/17/2021 140 133 - 144 mmol/L Final     Potassium   Date Value Ref Range Status   11/21/2023 4.3 3.4 - 5.3 mmol/L Final   05/18/2022 " 4.0 3.4 - 5.3 mmol/L Final   06/17/2021 4.1 3.4 - 5.3 mmol/L Final     Potassium POCT   Date Value Ref Range Status   09/13/2023 3.9 3.4 - 5.3 mmol/L Final     Chloride   Date Value Ref Range Status   11/21/2023 97 (L) 98 - 107 mmol/L Final   05/18/2022 104 94 - 109 mmol/L Final   06/17/2021 109 94 - 109 mmol/L Final     Carbon Dioxide   Date Value Ref Range Status   06/17/2021 28 20 - 32 mmol/L Final     Carbon Dioxide (CO2)   Date Value Ref Range Status   11/21/2023 20 (L) 22 - 29 mmol/L Final   05/18/2022 24 20 - 32 mmol/L Final     Anion Gap   Date Value Ref Range Status   11/21/2023 16 (H) 7 - 15 mmol/L Final   05/18/2022 10 3 - 14 mmol/L Final   06/17/2021 3 3 - 14 mmol/L Final     Glucose   Date Value Ref Range Status   11/21/2023 215 (H) 70 - 99 mg/dL Final   05/18/2022 124 (H) 70 - 99 mg/dL Final   06/17/2021 96 70 - 99 mg/dL Final     GLUCOSE BY METER POCT   Date Value Ref Range Status   11/08/2023 113 (H) 70 - 99 mg/dL Final     Urea Nitrogen   Date Value Ref Range Status   11/21/2023 13.1 6.0 - 20.0 mg/dL Final   05/18/2022 15 7 - 30 mg/dL Final   06/17/2021 6 (L) 7 - 30 mg/dL Final     Creatinine   Date Value Ref Range Status   11/21/2023 0.79 0.51 - 0.95 mg/dL Final   06/17/2021 0.65 0.52 - 1.04 mg/dL Final     GFR Estimate   Date Value Ref Range Status   11/21/2023 >90 >60 mL/min/1.73m2 Final   06/17/2021 >90 >60 mL/min/[1.73_m2] Final     Comment:     Non  GFR Calc  Starting 12/18/2018, serum creatinine based estimated GFR (eGFR) will be   calculated using the Chronic Kidney Disease Epidemiology Collaboration   (CKD-EPI) equation.       Calcium   Date Value Ref Range Status   11/21/2023 10.7 (H) 8.6 - 10.0 mg/dL Final   06/17/2021 8.1 (L) 8.5 - 10.1 mg/dL Final     CBC RESULTS:   Recent Labs   Lab Test 11/08/23  0929   WBC 11.8*   RBC 3.69*   HGB 10.1*   HCT 31.6*   MCV 86   MCH 27.4   MCHC 32.0   RDW 16.5*   *     Pertinent Medications/vitamin and mineral supplements:       Current Outpatient Medications   Medication    acetaminophen (TYLENOL) 500 MG tablet    albuterol (PROAIR HFA/PROVENTIL HFA/VENTOLIN HFA) 108 (90 Base) MCG/ACT inhaler    ARIPiprazole (ABILIFY) 2 MG tablet    artificial saliva (BIOTENE MT) SOLN solution    benzocaine-menthol (CHLORASEPTIC MAX) 15-10 MG lozenge    cholestyramine (QUESTRAN) 4 g packet    Cyanocobalamin (B-12 PO)    diphenoxylate-atropine (LOMOTIL) 2.5-0.025 MG tablet    famotidine (PEPCID) 20 MG tablet    guaiFENesin-dextromethorphan (ROBITUSSIN DM) 100-10 MG/5ML syrup    hydrOXYzine (ATARAX) 25 MG tablet    loperamide (IMODIUM) 2 MG capsule    magic mouthwash suspension, diphenhydrAMINE, lidocaine, aluminum-magnesium & simethicone, (FIRST-MOUTHWASH BLM) compounding kit    medical cannabis (Patient's own supply)    metFORMIN (GLUCOPHAGE) 500 MG tablet    methocarbamol (ROBAXIN) 750 MG tablet    ondansetron (ZOFRAN ODT) 4 MG ODT tab    Ostomy Supplies MISC    psyllium (METAMUCIL/KONSYL) Packet    triamcinolone (KENALOG) 0.1 % paste    venlafaxine (EFFEXOR) 75 MG tablet     No current facility-administered medications for this visit.     Facility-Administered Medications Ordered in Other Visits   Medication    lidocaine 1 % 9 mL    ondansetron (ZOFRAN ODT) ODT tab 4 mg    sodium bicarbonate 8.4 % injection 1 mEq       Food Allergies:  gluten    MALNUTRITION:  % Weight Loss:  > 7.5% in 3 months (severe malnutrition)  % Intake:  </= 75% for >/= 1 month (severe malnutrition)  Subcutaneous Fat Loss:  unable to determine  Muscle Loss:  unable to determine  Fluid Retention:  None noted    Malnutrition Diagnosis: Severe malnutrition  In Context of:  Chronic illness or disease    Nutrition Prescription: Nutrition Education     Nutrition Intervention      Provided diet education for ileostomy, high out put ileostomy diet tips, gluten free diet tips and increased calorie and protein tips.    Answered patient's questions. Patient verbalized understanding of  education provided. See Goals below.     Goals:    Aim for eating multiple smaller meals per day such as 6 or more smaller meals per day.    Drink mostly electrolyte drinks, less plain water such as Drip Drop or IV Hydration or Nuun tablets. (Don't dilute the electrolyte drinks). You can also look for unflavored options if you don't want so many sweet tasting drinks.    Aim for drinking the majority of fluids in between meals. (Okay to take sips of fluids with meals, but drink the majority of your fluids in between meals).    Continue including some foods to help bulk up stools such as gluten free oats/oatmeal, gluten free cream of rice cereal, rice, potato/sweet potato without peels, banana, applesauce, lower fiber crackers (such as rice crackers or nut thins), gluten free pretzels or gluten free rice cakes, peanut butter, gluten free toast or gluten free english muffin.    --This one may seem strange but gluten free marshmallows may help bulk up/decrease output.    Avoid dried fruits.     Bananas, canned pears, canned peaches, unsweetened applesauce, ripe melon, peel apple or peel ripe pears, avocado are usually better tolerated.    Avoid raw vegetables and lettuce. Instead have cooked vegetables. If the vegetables have any tough peels or skins even after cooked, then peel or avoid those that are stringy and tough to chew down well.    Chicken, turkey, fish, pork, lean beef, ground meat/ground poultry are all fine. If they are too tough or dry, then broth, gluten free gravy/sauce may help tolerate them better.    Drink at least 1-2 of your Evolve nutrition drinks daily. (These count as some smaller meals).    --can drink them plain or   --For more variety of flavors and extra calories, can blend in some banana or avocado or peanut butter and even some gluten free oats.    10.  Chew food very well before swallowing them.    11. Check out [employer] bak[employer] for a gluten free bread/gluten free buns to see if you like  them.    Nutrition Monitoring and Evaluation: Will monitor adherence to nutrition recommendations at future RD visits.     Further Medical Nutrition Therapy:  Follow-up as needed.    Patient was encouraged to contact RD with any further questions.    Shirley Mota MS, RD, LD

## 2023-11-21 NOTE — PATIENT INSTRUCTIONS
It was nice speaking with you today. Below are the nutrition recommendations we discussed at your visit.    Please let me know if you have any additional questions.    Nutrition Recommendations    Aim for eating multiple smaller meals per day such as 6 or more smaller meals per day.    Drink mostly electrolyte drinks, less plain water such as Drip Drop or IV Hydration or Nuun tablets. (Don't dilute the electrolyte drinks). You can also look for unflavored options if you don't want so many sweet tasting drinks.    Aim for drinking the majority of fluids in between meals. (Okay to take sips of fluids with meals, but drink the majority of your fluids in between meals).    Continue including some foods to help bulk up stools such as gluten free oats/oatmeal, gluten free cream of rice cereal, rice, potato/sweet potato without peels, banana, applesauce, lower fiber crackers (such as rice crackers or nut thins), gluten free pretzels or gluten free rice cakes, peanut butter, gluten free toast or gluten free english muffin.    --This one may seem strange but gluten free marshmallows may help bulk up/decrease output.    Avoid dried fruits.     Bananas, canned pears, canned peaches, unsweetened applesauce, ripe melon, peel apple or peel ripe pears, avocado are usually better tolerated.    Avoid raw vegetables and lettuce. Instead have cooked vegetables. If the vegetables have any tough peels or skins even after cooked, then peel or avoid those that are stringy and tough to chew down well.    Chicken, turkey, fish, pork, lean beef, ground meat/ground poultry are all fine. If they are too tough or dry, then broth, gluten free gravy/sauce may help tolerate them better.    Drink at least 1-2 of your [employer] nutrition drinks daily. (These count as some smaller meals).    --can drink them plain or   --For more variety of flavors and extra calories, can blend in some banana or avocado or peanut butter and even some gluten free  oats.    10.  Chew food very well before swallowing them.    11. Check out [employer] bak[employer] for a gluten free bread/gluten free buns to see if you like them.    Can follow up as needed.    If you would like to schedule a follow up appointment, please call [phone].    Thank you,    Shirley Mota, MS, RD, LD

## 2023-11-21 NOTE — LETTER
11/21/2023         RE: Doretha Yu  [id number]  [facility] [address]        Dear Colleague,    Thank you for referring your patient, Doretha Yu, to the [facility] [place]. Please see a copy of my visit note below.    Virtual Visit Details    Type of service:  Telephone Visit   Phone call duration: 49 minutes     [facility] Outpatient Medical Nutrition Therapy      Time Spent:  49 minutes  Session Type:  Initial   Referring Physician:  Beti Issa PA-C  Reason for RD Visit:   high output ileostomy, ulcerative colitis     Nutrition Assessment:  Patient is a 48 year old female with history that includes celiac, ulcerative colitis, s/p total abdominal colectomy with end ileostomy in 6/15/2021 and robotic completion proctectomy with creation of J pouch with IPAA takedown of ileostomy and now with diverting loop ileostomy on 9/5/2023. She is having high output of stool in her ostomy since her surgery in 9/2023. She reported having 8146-9233 ml output daily. Whenever she eats, she has high output of watery stool. She was drinking a lot of water about 6-7 x 16 oz bottles of water and was drinking some watered down pedialyte and electrolyte drinks bc they were too sweet for her. She is planning to get some drip drop electrolyte drinks and not watering down electrolyte drinks now since meeting with [facility] recently. She is now having some oatmeal for breakfast just recently to try to help bulk up stools. She adds raisins and brown sugar to oatmeal. She has noticed that oatmeal, potato chips seems to help thicken stools lately. Tolerates potatoes and fries and GF pancake helped recently with output as well. Eating white rice, bananas as well to try to help thicken stools. She hasn't found a gluten free bread that she likes yet. Tolerates gluten free crackers. Since her celiac diagnosis earlier in the year, she has not eaten any gluten. Previously stools with old stoma  "were fine, but since her new diverting ileostomy in September 2023, she is having issues with high output. She has no appetite and less energy to cook and feeling nauseous all the time. Takes some zofran. She also gets IV fluids as well. She stated that she needs to get better about eating multiple smaller meals but due to no appetite and less energy to cook and feeling nauseous all the time and d/t eating causing watery high output, she has not been doing this.     Height:   Ht Readings from Last 1 Encounters:   11/21/23 1.6 m (5' 3\")     Weight:   Wt Readings from Last 10 Encounters:   11/21/23 82.6 kg (182 lb)   11/07/23 88.6 kg (195 lb 5.2 oz)   10/26/23 84.8 kg (187 lb)   10/23/23 85.3 kg (188 lb)   10/10/23 87.7 kg (193 lb 4.8 oz)   09/14/23 94.8 kg (209 lb)   09/13/23 93.9 kg (207 lb)   09/05/23 95.2 kg (209 lb 14.1 oz)   08/22/23 93.4 kg (206 lb)   07/11/23 92.5 kg (204 lb)     BMI: Estimated body mass index is 32.24 kg/m  as calculated from the following:    Height as of this encounter: 1.6 m (5' 3\").    Weight as of this encounter: 82.6 kg (182 lb).    Diet Recall:  (some usual/recent meals/snacks/beverages):  Meal Food    Breakfast 10 AM: lately some oatmeal, sometimes yogurt (but currently out)   Lunch skips   Dinner Chicken salad and tortilla chips (broke up into a couple of time)   Snacks PB with raisins on a spoon or celery, PB, raisins or dips banana with PB   Beverages 6-7 x 16 oz bottles Water, IV hydration, rehydrate or gatorade fit or vitamin water, pedialyte. Planning to buy drip drop, drinks fairlife lactose free milk, protein shakes (Evolve)   Alcohol Intake none      Labs:    Last Comprehensive Metabolic Panel:  Sodium   Date Value Ref Range Status   11/21/2023 133 (L) 135 - 145 mmol/L Final     Comment:     Reference intervals for this test were updated on 09/26/2023 to more accurately reflect our healthy population. There may be differences in the flagging of prior results with similar " values performed with this method. Interpretation of those prior results can be made in the context of the updated reference intervals.    06/17/2021 140 133 - 144 mmol/L Final     Potassium   Date Value Ref Range Status   11/21/2023 4.3 3.4 - 5.3 mmol/L Final   05/18/2022 4.0 3.4 - 5.3 mmol/L Final   06/17/2021 4.1 3.4 - 5.3 mmol/L Final     Potassium POCT   Date Value Ref Range Status   09/13/2023 3.9 3.4 - 5.3 mmol/L Final     Chloride   Date Value Ref Range Status   11/21/2023 97 (L) 98 - 107 mmol/L Final   05/18/2022 104 94 - 109 mmol/L Final   06/17/2021 109 94 - 109 mmol/L Final     Carbon Dioxide   Date Value Ref Range Status   06/17/2021 28 20 - 32 mmol/L Final     Carbon Dioxide (CO2)   Date Value Ref Range Status   11/21/2023 20 (L) 22 - 29 mmol/L Final   05/18/2022 24 20 - 32 mmol/L Final     Anion Gap   Date Value Ref Range Status   11/21/2023 16 (H) 7 - 15 mmol/L Final   05/18/2022 10 3 - 14 mmol/L Final   06/17/2021 3 3 - 14 mmol/L Final     Glucose   Date Value Ref Range Status   11/21/2023 215 (H) 70 - 99 mg/dL Final   05/18/2022 124 (H) 70 - 99 mg/dL Final   06/17/2021 96 70 - 99 mg/dL Final     GLUCOSE BY METER POCT   Date Value Ref Range Status   11/08/2023 113 (H) 70 - 99 mg/dL Final     Urea Nitrogen   Date Value Ref Range Status   11/21/2023 13.1 6.0 - 20.0 mg/dL Final   05/18/2022 15 7 - 30 mg/dL Final   06/17/2021 6 (L) 7 - 30 mg/dL Final     Creatinine   Date Value Ref Range Status   11/21/2023 0.79 0.51 - 0.95 mg/dL Final   06/17/2021 0.65 0.52 - 1.04 mg/dL Final     GFR Estimate   Date Value Ref Range Status   11/21/2023 >90 >60 mL/min/1.73m2 Final   06/17/2021 >90 >60 mL/min/[1.73_m2] Final     Comment:     Non  GFR Calc  Starting 12/18/2018, serum creatinine based estimated GFR (eGFR) will be   calculated using the Chronic Kidney Disease Epidemiology Collaboration   (CKD-EPI) equation.       Calcium   Date Value Ref Range Status   11/21/2023 10.7 (H) 8.6 - 10.0 mg/dL  Final   06/17/2021 8.1 (L) 8.5 - 10.1 mg/dL Final     CBC RESULTS:   Recent Labs   Lab Test 11/08/23  0929   WBC 11.8*   RBC 3.69*   HGB 10.1*   HCT 31.6*   MCV 86   MCH 27.4   MCHC 32.0   RDW 16.5*   *     Pertinent Medications/vitamin and mineral supplements:      Current Outpatient Medications   Medication     acetaminophen (TYLENOL) 500 MG tablet     albuterol (PROAIR HFA/PROVENTIL HFA/VENTOLIN HFA) 108 (90 Base) MCG/ACT inhaler     ARIPiprazole (ABILIFY) 2 MG tablet     artificial saliva (BIOTENE MT) SOLN solution     benzocaine-menthol (CHLORASEPTIC MAX) 15-10 MG lozenge     cholestyramine (QUESTRAN) 4 g packet     Cyanocobalamin (B-12 PO)     diphenoxylate-atropine (LOMOTIL) 2.5-0.025 MG tablet     famotidine (PEPCID) 20 MG tablet     guaiFENesin-dextromethorphan (ROBITUSSIN DM) 100-10 MG/5ML syrup     hydrOXYzine (ATARAX) 25 MG tablet     loperamide (IMODIUM) 2 MG capsule     magic mouthwash suspension, diphenhydrAMINE, lidocaine, aluminum-magnesium & simethicone, (FIRST-MOUTHWASH BLM) compounding kit     medical cannabis (Patient's own supply)     metFORMIN (GLUCOPHAGE) 500 MG tablet     methocarbamol (ROBAXIN) 750 MG tablet     ondansetron (ZOFRAN ODT) 4 MG ODT tab     Ostomy Supplies MISC     psyllium (METAMUCIL/KONSYL) Packet     triamcinolone (KENALOG) 0.1 % paste     venlafaxine (EFFEXOR) 75 MG tablet     No current facility-administered medications for this visit.     Facility-Administered Medications Ordered in Other Visits   Medication     lidocaine 1 % 9 mL     ondansetron (ZOFRAN ODT) ODT tab 4 mg     sodium bicarbonate 8.4 % injection 1 mEq       Food Allergies:  gluten    MALNUTRITION:  % Weight Loss:  > 7.5% in 3 months (severe malnutrition)  % Intake:  </= 75% for >/= 1 month (severe malnutrition)  Subcutaneous Fat Loss:  unable to determine  Muscle Loss:  unable to determine  Fluid Retention:  None noted    Malnutrition Diagnosis: Severe malnutrition  In Context of:  Chronic illness or  disease    Nutrition Prescription: Nutrition Education     Nutrition Intervention      Provided diet education for ileostomy, high out put ileostomy diet tips, gluten free diet tips and increased calorie and protein tips.    Answered patient's questions. Patient verbalized understanding of education provided. See Goals below.     Goals:    Aim for eating multiple smaller meals per day such as 6 or more smaller meals per day.    Drink mostly electrolyte drinks, less plain water such as Drip Drop or IV Hydration or Nuun tablets. (Don't dilute the electrolyte drinks). You can also look for unflavored options if you don't want so many sweet tasting drinks.    Aim for drinking the majority of fluids in between meals. (Okay to take sips of fluids with meals, but drink the majority of your fluids in between meals).    Continue including some foods to help bulk up stools such as gluten free oats/oatmeal, gluten free cream of rice cereal, rice, potato/sweet potato without peels, banana, applesauce, lower fiber crackers (such as rice crackers or nut thins), gluten free pretzels or gluten free rice cakes, peanut butter, gluten free toast or gluten free english muffin.    --This one may seem strange but gluten free marshmallows may help bulk up/decrease output.    Avoid dried fruits.     Bananas, canned pears, canned peaches, unsweetened applesauce, ripe melon, peel apple or peel ripe pears, avocado are usually better tolerated.    Avoid raw vegetables and lettuce. Instead have cooked vegetables. If the vegetables have any tough peels or skins even after cooked, then peel or avoid those that are stringy and tough to chew down well.    Chicken, turkey, fish, pork, lean beef, ground meat/ground poultry are all fine. If they are too tough or dry, then broth, gluten free gravy/sauce may help tolerate them better.    Drink at least 1-2 of your Evolve nutrition drinks daily. (These count as some smaller meals).    --can drink them  plain or   --For more variety of flavors and extra calories, can blend in some banana or avocado or peanut butter and even some gluten free oats.    10.  Chew food very well before swallowing them.    11. Check out [employer] for a gluten free bread/gluten free buns to see if you like them.    Nutrition Monitoring and Evaluation: Will monitor adherence to nutrition recommendations at future RD visits.     Further Medical Nutrition Therapy:  Follow-up as needed.    Patient was encouraged to contact RD with any further questions.    Shirley Mota, MS, RD, LD        Again, thank you for allowing me to participate in the care of your patient.      Sincerely,    Shirley Mota RD

## 2023-11-22 ENCOUNTER — INFUSION THERAPY VISIT (OUTPATIENT)
Dept: INFUSION THERAPY | Facility: CLINIC | Age: 48
End: 2023-11-22
Payer: COMMERCIAL

## 2023-11-22 VITALS — HEART RATE: 91 BPM | SYSTOLIC BLOOD PRESSURE: 100 MMHG | DIASTOLIC BLOOD PRESSURE: 70 MMHG | RESPIRATION RATE: 16 BRPM

## 2023-11-22 DIAGNOSIS — Z93.2 HIGH OUTPUT ILEOSTOMY (H): Primary | ICD-10-CM

## 2023-11-22 DIAGNOSIS — R19.8 HIGH OUTPUT ILEOSTOMY (H): Primary | ICD-10-CM

## 2023-11-22 PROCEDURE — 96374 THER/PROPH/DIAG INJ IV PUSH: CPT

## 2023-11-22 PROCEDURE — 258N000003 HC RX IP 258 OP 636

## 2023-11-22 PROCEDURE — 96361 HYDRATE IV INFUSION ADD-ON: CPT

## 2023-11-22 PROCEDURE — 250N000011 HC RX IP 250 OP 636: Mod: JZ | Performed by: SURGERY

## 2023-11-22 RX ORDER — HEPARIN SODIUM,PORCINE 10 UNIT/ML
5-20 VIAL (ML) INTRAVENOUS DAILY PRN
Status: CANCELLED | OUTPATIENT
Start: 2023-11-22

## 2023-11-22 RX ORDER — LIDOCAINE HYDROCHLORIDE 10 MG/ML
1 INJECTION, SOLUTION EPIDURAL; INFILTRATION; INTRACAUDAL; PERINEURAL ONCE
Status: COMPLETED | OUTPATIENT
Start: 2023-11-22 | End: 2023-11-22

## 2023-11-22 RX ORDER — HEPARIN SODIUM (PORCINE) LOCK FLUSH IV SOLN 100 UNIT/ML 100 UNIT/ML
5 SOLUTION INTRAVENOUS
Status: CANCELLED | OUTPATIENT
Start: 2023-11-22

## 2023-11-22 RX ORDER — ONDANSETRON 2 MG/ML
4 INJECTION INTRAMUSCULAR; INTRAVENOUS EVERY 6 HOURS PRN
Status: DISCONTINUED | OUTPATIENT
Start: 2023-11-22 | End: 2023-11-22 | Stop reason: HOSPADM

## 2023-11-22 RX ORDER — ALBUTEROL SULFATE 0.83 MG/ML
2.5 SOLUTION RESPIRATORY (INHALATION)
Status: CANCELLED | OUTPATIENT
Start: 2023-11-22

## 2023-11-22 RX ORDER — EPINEPHRINE 1 MG/ML
0.3 INJECTION, SOLUTION, CONCENTRATE INTRAVENOUS EVERY 5 MIN PRN
Status: CANCELLED | OUTPATIENT
Start: 2023-11-22

## 2023-11-22 RX ORDER — METHYLPREDNISOLONE SODIUM SUCCINATE 125 MG/2ML
125 INJECTION, POWDER, LYOPHILIZED, FOR SOLUTION INTRAMUSCULAR; INTRAVENOUS
Status: CANCELLED
Start: 2023-11-22

## 2023-11-22 RX ORDER — MEPERIDINE HYDROCHLORIDE 25 MG/ML
25 INJECTION INTRAMUSCULAR; INTRAVENOUS; SUBCUTANEOUS EVERY 30 MIN PRN
Status: CANCELLED | OUTPATIENT
Start: 2023-11-22

## 2023-11-22 RX ORDER — ONDANSETRON 2 MG/ML
4 INJECTION INTRAMUSCULAR; INTRAVENOUS EVERY 6 HOURS PRN
Status: CANCELLED
Start: 2023-11-22

## 2023-11-22 RX ORDER — ALBUTEROL SULFATE 90 UG/1
1-2 AEROSOL, METERED RESPIRATORY (INHALATION)
Status: CANCELLED
Start: 2023-11-22

## 2023-11-22 RX ORDER — DIPHENHYDRAMINE HYDROCHLORIDE 50 MG/ML
50 INJECTION INTRAMUSCULAR; INTRAVENOUS
Status: CANCELLED
Start: 2023-11-22

## 2023-11-22 RX ADMIN — SODIUM CHLORIDE 1000 ML: 9 INJECTION, SOLUTION INTRAVENOUS at 13:12

## 2023-11-22 RX ADMIN — ONDANSETRON 4 MG: 2 INJECTION INTRAMUSCULAR; INTRAVENOUS at 13:14

## 2023-11-22 NOTE — PROGRESS NOTES
Infusion Nursing Note:  Doretha Yu presents today for IVF.    Patient seen by provider today: No   present during visit today: Not Applicable.    Note: Pt is a difficult IV start. Plan to SL and leave IV in for the week. Pt has fluid infusion appointments three times a week.      Intravenous Access:  Peripheral IV placed.    Treatment Conditions:  Not Applicable.      Post Infusion Assessment:  Patient tolerated infusion without incident.  Blood return noted pre and post infusion.  Site patent and intact, free from redness, edema or discomfort.  No evidence of extravasations.  IV SL and wrapped with coban.       Discharge Plan:   Discharge instructions reviewed with: Patient.  Patient and/or family verbalized understanding of discharge instructions and all questions answered.  Patient discharged in stable condition accompanied by: self.  Departure Mode: Ambulatory.      Chelsea Rodriguez RN

## 2023-11-25 ENCOUNTER — APPOINTMENT (OUTPATIENT)
Dept: CT IMAGING | Facility: CLINIC | Age: 48
End: 2023-11-25
Attending: EMERGENCY MEDICINE
Payer: COMMERCIAL

## 2023-11-25 ENCOUNTER — HOSPITAL ENCOUNTER (EMERGENCY)
Facility: CLINIC | Age: 48
Discharge: HOME | End: 2023-11-25
Attending: EMERGENCY MEDICINE | Admitting: EMERGENCY MEDICINE
Payer: COMMERCIAL

## 2023-11-25 ENCOUNTER — OFFICE VISIT (OUTPATIENT)
Dept: URGENT CARE | Facility: URGENT CARE | Age: 48
End: 2023-11-25
Payer: COMMERCIAL

## 2023-11-25 ENCOUNTER — TELEPHONE (OUTPATIENT)
Dept: SURGERY | Facility: CLINIC | Age: 48
End: 2023-11-25

## 2023-11-25 VITALS
HEART RATE: 81 BPM | DIASTOLIC BLOOD PRESSURE: 81 MMHG | BODY MASS INDEX: 32.25 KG/M2 | OXYGEN SATURATION: 100 % | HEIGHT: 63 IN | TEMPERATURE: 98.5 F | WEIGHT: 182 LBS | SYSTOLIC BLOOD PRESSURE: 120 MMHG | RESPIRATION RATE: 20 BRPM

## 2023-11-25 VITALS
RESPIRATION RATE: 18 BRPM | TEMPERATURE: 97.2 F | DIASTOLIC BLOOD PRESSURE: 83 MMHG | OXYGEN SATURATION: 100 % | WEIGHT: 182 LBS | SYSTOLIC BLOOD PRESSURE: 115 MMHG | BODY MASS INDEX: 32.24 KG/M2 | HEART RATE: 114 BPM

## 2023-11-25 DIAGNOSIS — R30.0 DYSURIA: Primary | ICD-10-CM

## 2023-11-25 DIAGNOSIS — B37.31 YEAST INFECTION OF THE VAGINA: ICD-10-CM

## 2023-11-25 DIAGNOSIS — R06.00 DYSPNEA, UNSPECIFIED TYPE: ICD-10-CM

## 2023-11-25 LAB
ALBUMIN SERPL BCG-MCNC: 4.6 G/DL (ref 3.5–5.2)
ALBUMIN UR-MCNC: >=300 MG/DL
ALP SERPL-CCNC: 180 U/L (ref 40–150)
ALT SERPL W P-5'-P-CCNC: 82 U/L (ref 0–50)
AMORPH CRY #/AREA URNS HPF: ABNORMAL /HPF
ANION GAP SERPL CALCULATED.3IONS-SCNC: 17 MMOL/L (ref 7–15)
APPEARANCE UR: ABNORMAL
AST SERPL W P-5'-P-CCNC: 60 U/L (ref 0–45)
BACTERIA #/AREA URNS HPF: ABNORMAL /HPF
BASOPHILS # BLD AUTO: 0.1 10E3/UL (ref 0–0.2)
BASOPHILS NFR BLD AUTO: 1 %
BILIRUB SERPL-MCNC: 0.2 MG/DL
BILIRUB UR QL STRIP: ABNORMAL
BUN SERPL-MCNC: 18.2 MG/DL (ref 6–20)
CALCIUM SERPL-MCNC: 10.3 MG/DL (ref 8.6–10)
CHLORIDE SERPL-SCNC: 97 MMOL/L (ref 98–107)
CLUE CELLS: ABNORMAL
COLOR UR AUTO: YELLOW
CREAT SERPL-MCNC: 0.8 MG/DL (ref 0.51–0.95)
D DIMER PPP FEU-MCNC: 0.39 UG/ML FEU (ref 0–0.5)
DEPRECATED HCO3 PLAS-SCNC: 20 MMOL/L (ref 22–29)
EGFRCR SERPLBLD CKD-EPI 2021: >90 ML/MIN/1.73M2
EOSINOPHIL # BLD AUTO: 0.2 10E3/UL (ref 0–0.7)
EOSINOPHIL NFR BLD AUTO: 2 %
ERYTHROCYTE [DISTWIDTH] IN BLOOD BY AUTOMATED COUNT: 16.7 % (ref 10–15)
GLUCOSE SERPL-MCNC: 87 MG/DL (ref 70–99)
GLUCOSE UR STRIP-MCNC: NEGATIVE MG/DL
HCT VFR BLD AUTO: 38.8 % (ref 35–47)
HGB BLD-MCNC: 12.8 G/DL (ref 11.7–15.7)
HGB UR QL STRIP: ABNORMAL
IMM GRANULOCYTES # BLD: 0.1 10E3/UL
IMM GRANULOCYTES NFR BLD: 1 %
KETONES UR STRIP-MCNC: 15 MG/DL
LEUKOCYTE ESTERASE UR QL STRIP: ABNORMAL
LYMPHOCYTES # BLD AUTO: 3.6 10E3/UL (ref 0.8–5.3)
LYMPHOCYTES NFR BLD AUTO: 31 %
MCH RBC QN AUTO: 27.5 PG (ref 26.5–33)
MCHC RBC AUTO-ENTMCNC: 33 G/DL (ref 31.5–36.5)
MCV RBC AUTO: 83 FL (ref 78–100)
MONOCYTES # BLD AUTO: 0.7 10E3/UL (ref 0–1.3)
MONOCYTES NFR BLD AUTO: 6 %
NEUTROPHILS # BLD AUTO: 7 10E3/UL (ref 1.6–8.3)
NEUTROPHILS NFR BLD AUTO: 59 %
NITRATE UR QL: NEGATIVE
NRBC # BLD AUTO: 0 10E3/UL
NRBC BLD AUTO-RTO: 0 /100
NT-PROBNP SERPL-MCNC: <36 PG/ML (ref 0–450)
PH UR STRIP: 5 [PH] (ref 5–7)
PLATELET # BLD AUTO: 419 10E3/UL (ref 150–450)
POTASSIUM SERPL-SCNC: 4.4 MMOL/L (ref 3.4–5.3)
PROT SERPL-MCNC: 8 G/DL (ref 6.4–8.3)
RBC # BLD AUTO: 4.66 10E6/UL (ref 3.8–5.2)
RBC #/AREA URNS AUTO: >100 /HPF
SODIUM SERPL-SCNC: 134 MMOL/L (ref 135–145)
SP GR UR STRIP: >=1.03 (ref 1–1.03)
SQUAMOUS #/AREA URNS AUTO: ABNORMAL /LPF
TRICHOMONAS, WET PREP: ABNORMAL
TROPONIN T SERPL HS-MCNC: 8 NG/L
UROBILINOGEN UR STRIP-ACNC: 1 E.U./DL
WBC # BLD AUTO: 11.8 10E3/UL (ref 4–11)
WBC #/AREA URNS AUTO: ABNORMAL /HPF
WBC'S/HIGH POWER FIELD, WET PREP: ABNORMAL
YEAST, WET PREP: PRESENT

## 2023-11-25 PROCEDURE — 84484 ASSAY OF TROPONIN QUANT: CPT | Performed by: EMERGENCY MEDICINE

## 2023-11-25 PROCEDURE — 87210 SMEAR WET MOUNT SALINE/INK: CPT | Performed by: NURSE PRACTITIONER

## 2023-11-25 PROCEDURE — 80053 COMPREHEN METABOLIC PANEL: CPT | Performed by: EMERGENCY MEDICINE

## 2023-11-25 PROCEDURE — 85025 COMPLETE CBC W/AUTO DIFF WBC: CPT | Performed by: EMERGENCY MEDICINE

## 2023-11-25 PROCEDURE — 83880 ASSAY OF NATRIURETIC PEPTIDE: CPT | Performed by: EMERGENCY MEDICINE

## 2023-11-25 PROCEDURE — 99213 OFFICE O/P EST LOW 20 MIN: CPT | Performed by: NURSE PRACTITIONER

## 2023-11-25 PROCEDURE — 250N000011 HC RX IP 250 OP 636: Performed by: EMERGENCY MEDICINE

## 2023-11-25 PROCEDURE — 99284 EMERGENCY DEPT VISIT MOD MDM: CPT | Performed by: EMERGENCY MEDICINE

## 2023-11-25 PROCEDURE — 87086 URINE CULTURE/COLONY COUNT: CPT | Performed by: NURSE PRACTITIONER

## 2023-11-25 PROCEDURE — 96360 HYDRATION IV INFUSION INIT: CPT | Performed by: EMERGENCY MEDICINE

## 2023-11-25 PROCEDURE — 250N000009 HC RX 250: Performed by: EMERGENCY MEDICINE

## 2023-11-25 PROCEDURE — 36415 COLL VENOUS BLD VENIPUNCTURE: CPT | Performed by: EMERGENCY MEDICINE

## 2023-11-25 PROCEDURE — 81001 URINALYSIS AUTO W/SCOPE: CPT | Performed by: NURSE PRACTITIONER

## 2023-11-25 PROCEDURE — 258N000003 HC RX IP 258 OP 636: Performed by: EMERGENCY MEDICINE

## 2023-11-25 PROCEDURE — 85379 FIBRIN DEGRADATION QUANT: CPT | Performed by: EMERGENCY MEDICINE

## 2023-11-25 PROCEDURE — 99285 EMERGENCY DEPT VISIT HI MDM: CPT | Mod: 25 | Performed by: EMERGENCY MEDICINE

## 2023-11-25 PROCEDURE — 71275 CT ANGIOGRAPHY CHEST: CPT

## 2023-11-25 RX ORDER — NITROFURANTOIN 25; 75 MG/1; MG/1
100 CAPSULE ORAL 2 TIMES DAILY
Qty: 14 CAPSULE | Refills: 0 | Status: SHIPPED | OUTPATIENT
Start: 2023-11-25 | End: 2023-12-02

## 2023-11-25 RX ORDER — FLUCONAZOLE 150 MG/1
TABLET ORAL
COMMUNITY
Start: 2023-11-10 | End: 2023-11-28

## 2023-11-25 RX ORDER — IOPAMIDOL 755 MG/ML
79 INJECTION, SOLUTION INTRAVASCULAR ONCE
Status: COMPLETED | OUTPATIENT
Start: 2023-11-25 | End: 2023-11-25

## 2023-11-25 RX ORDER — TERCONAZOLE 0.4 %
1 CREAM WITH APPLICATOR VAGINAL AT BEDTIME
Qty: 15 G | Refills: 0 | Status: ON HOLD | OUTPATIENT
Start: 2023-11-25 | End: 2023-12-28

## 2023-11-25 RX ORDER — SUCRALFATE ORAL 1 G/10ML
SUSPENSION ORAL
COMMUNITY
Start: 2023-02-01 | End: 2023-12-15

## 2023-11-25 RX ADMIN — SODIUM CHLORIDE 100 ML: 9 INJECTION, SOLUTION INTRAVENOUS at 20:09

## 2023-11-25 RX ADMIN — SODIUM CHLORIDE 1000 ML: 9 INJECTION, SOLUTION INTRAVENOUS at 20:56

## 2023-11-25 RX ADMIN — IOPAMIDOL 79 ML: 755 INJECTION, SOLUTION INTRAVENOUS at 20:09

## 2023-11-25 ASSESSMENT — ENCOUNTER SYMPTOMS
FEVER: 0
EYE ITCHING: 0
COUGH: 1
DYSURIA: 1
DIFFICULTY URINATING: 1
CHILLS: 0

## 2023-11-25 ASSESSMENT — ACTIVITIES OF DAILY LIVING (ADL)
ADLS_ACUITY_SCORE: 37
ADLS_ACUITY_SCORE: 37

## 2023-11-25 NOTE — PROGRESS NOTES
"Assessment & Plan     Dysuria  - UA Macroscopic with reflex to Microscopic and Culture - Clinic Collect  UA RESULTS:  Recent Labs   Lab Test 11/25/23  1228   COLOR Yellow   APPEARANCE Cloudy*   URINEGLC Negative   URINEBILI Small*   URINEKETONE 15*   SG >=1.030   UBLD Large*   URINEPH 5.0   PROTEIN >=300*   UROBILINOGEN 1.0   NITRITE Negative   LEUKEST Small*   RBCU >100*   WBCU 10-25*      - UA Microscopic with Reflex to Culture  - Urine Culture  - nitroFURantoin macrocrystal-monohydrate (MACROBID) 100 MG capsule  Dispense: 14 capsule; Refill: 0  - Wet prep - Clinic Collect positive yeast     Yeast infection of the vagina  - terconazole (TERAZOL 7) 0.4 % vaginal cream  Dispense: 15 g; Refill: 0      Patient states she may go to the hosptial related to elevated heart rate  NP offered to do EKG here in the clinic and patient declined  Patient is concerned about her Magnesium status  Patient is concerned about her dehydration status     Return in about 2 days (around 11/27/2023).    Paige Coffman NP,   [facility]    Lance Coyne is a 48 year old female who presents to clinic today for the following health issues: NP noted elevated heart rate of 114 for several days.  Patient states notes elevated heart rate with walking to the a chair or bed up to \"150\"frequently.   Chief Complaint   Patient presents with    UTI     Pt has frequency, blood in urine, burning with urination.      Ear Problem     Pt has fluid in her left ear     URI Adult  Onset of symptoms was 2 weeks ago.  Course of illness is worsening.    Severity moderately severe  Current and Associated symptoms: runny nose, stuffy nose, non productive cough, wheezing, shortness of breath, and ear pain left  Patient is having high out put from her ostomy and getting 3 infusions a week   Treatment measures tried include Tylenol/Ibuprofen.  Predisposing factors include None.    UTI  Onset of symptoms was 2week(s).Patient was " treated with Difucan for vaginal yeast infection and h Metronidazole 075% for BV on 11/10/2023.  Course of illness is worsening  Severity moderately severe  Current and associated symptoms dysuria, frequency, burning, blood in urine, and voiding in small amounts. Patient has itching of the vaginal area and burning   Treatment and measures tried Drinking fluids   Predisposing factors include history of frequent UTI's and yeast infections  Patient denies vomiting, and vaginal discharge    Review of Systems   Constitutional:  Negative for chills and fever.   Eyes:  Negative for itching.   Respiratory:  Positive for cough.    Cardiovascular:  Negative for chest pain.   Genitourinary:  Positive for difficulty urinating, dysuria and urgency.        Vaginal irritation          Objective    /83   Pulse 114   Temp 97.2  F (36.2  C) (Tympanic)   Resp 18   Wt 82.6 kg (182 lb)   SpO2 100%   BMI 32.24 kg/m    Physical Exam  Vitals and nursing note reviewed.   Constitutional:       Appearance: Normal appearance.   HENT:      Head: Normocephalic and atraumatic.      Right Ear: Tympanic membrane, ear canal and external ear normal.      Left Ear: Tympanic membrane, ear canal and external ear normal.      Nose: Nose normal.      Right Turbinates: Swollen.      Left Turbinates: Swollen.      Right Sinus: No maxillary sinus tenderness or frontal sinus tenderness.      Left Sinus: No maxillary sinus tenderness or frontal sinus tenderness.      Mouth/Throat:      Mouth: Mucous membranes are moist.   Eyes:      Conjunctiva/sclera: Conjunctivae normal.   Cardiovascular:      Rate and Rhythm: Regular rhythm. Tachycardia present.      Pulses: Normal pulses.      Heart sounds: Normal heart sounds.   Pulmonary:      Effort: Pulmonary effort is normal.      Breath sounds: Normal breath sounds.   Lymphadenopathy:      Cervical: Cervical adenopathy present.   Skin:     General: Skin is warm and dry.   Neurological:      Mental Status:  She is alert.   Psychiatric:         Mood and Affect: Mood normal.         Behavior: Behavior normal.

## 2023-11-25 NOTE — TELEPHONE ENCOUNTER
Patient called with concerns of baseline tachycardia with HRs in 100s while resting with associated dyspnea. States she is having ongoing high ileostomy outputs with 1.5+ Liters of output per day. Taking all prescribed bowel regimen. Some small increased thickening of her stool, but otherwise no significant decrease in output.     Patient denies any fevers, chills, nausea, vomiting. Tolerating diet. Making good urine though did get diagnosed with a recent UTI and has now been started on outpatient treatment. Describes significant fatigue and lethargy. Also endorsing concerns of DVT/PE which have happened to her in the past. At the time of our phone call, patient was already in the waiting room of an OSH ED, therefore I recommended that she stay there for evaluation. Discussed that this could certainly be symptoms of dehydration related to her high ostomy outputs, however hard to discern other insidious causes. Patient was understanding and agreed for evaluation at OSH.     All questions answered at the close of this telephone encounter.     Zuly Ruiz MD  General Surgery, PGY-2  On-call Resident

## 2023-11-25 NOTE — ED TRIAGE NOTES
UC today diagnosed with UTI, prescribed medication.  Pt has increased sob and was recommended to go to ED.     Triage Assessment (Adult)       Row Name 11/25/23 1436          Triage Assessment    Airway WDL WDL        Respiratory WDL    Respiratory WDL WDL

## 2023-11-25 NOTE — ED NOTES
Patient verbalized here for SOB, dizziness, and increased resting heart rate.  Patient has been going to infusion clinic 3 times/week for the last 2 weeks for IV hydration.  H/O metastatic melanoma.

## 2023-11-26 LAB — BACTERIA UR CULT: NORMAL

## 2023-11-26 NOTE — ED PROVIDER NOTES
History     Chief Complaint   Patient presents with    Shortness of Breath     HPI  Doretha Yu is a 48 year old female who presents with shortness of air dyspnea on exertion and palpitations.  Worsening over the past week or so.  Denies vomiting.  She has had admission to [facility] at the beginning of the month secondary to ulcerative colitis with high output ostomy and malnutrition.  She describes decreased urinary output.  She denies any fever.  She was seen earlier today and had urinalysis accomplished which showed greater than 100 red cells, 10-25 white cells, moderate squamous epithelial cells, negative nitrite, and small LE.  Started on nitrofurantoin.  Has known history of prothrombin deficiency, had documented right upper extremity superficial venous clot at admission early in the month.  Not on anticoagulation.  Denies leg pain or swelling.  No history of DVT.    Allergies:  Allergies   Allergen Reactions    Bee Venom Swelling    Azithromycin Diarrhea    Erythromycin      Other reaction(s): GI intolerance, Vomiting    Fentanyl Other (See Comments)     sweating  sweating    Prochlorperazine Fatigue     Other reaction(s): Other (see comments)  Fatigue    Buspirone      Other reaction(s): GI intolerance  vomiting    Erythrocin Nausea and Vomiting    Gluten Meal      Celiac disease    Topamax [Topiramate]      Made her lethargic    Zithromax [Azithromycin Dihydrate] Diarrhea    Enbrel [Etanercept] Hives and Rash       Problem List:    Patient Active Problem List    Diagnosis Date Noted    High output ileostomy (H) 10/30/2023     Priority: Medium    Post-op pain 09/13/2023     Priority: Medium    Gastroesophageal reflux disease without esophagitis 09/05/2023     Priority: Medium    Biliary dyskinesia 01/11/2023     Priority: Medium    Migraines      Priority: Medium    Colostomy in place (H) 07/16/2021     Priority: Medium    S/P colectomy 07/16/2021     Priority: Medium    Ulcerative colitis  without complications, unspecified location (H) 06/03/2021     Priority: Medium    Dehydration 04/13/2021     Priority: Medium    Hematochezia 04/13/2021     Priority: Medium    Diarrhea of infectious origin 04/13/2021     Priority: Medium    C. difficile colitis 04/13/2021     Priority: Medium    Adjustment disorder with mixed emotional features 06/16/2020     Priority: Medium    Polyarthralgia 04/29/2020     Priority: Medium    Drug-induced polyneuropathy (H24) 04/29/2020     Priority: Medium    Pain syndrome, chronic 02/12/2020     Priority: Medium    Drug-induced Cushing's syndrome (H24) 02/12/2020     Priority: Medium    Diabetes mellitus, iatrogenic (H) 01/28/2020     Priority: Medium    High risk HPV infection 01/28/2020     Priority: Medium     Added automatically from request for surgery [id number]      Immunosuppression (H24) 01/28/2020     Priority: Medium     Added automatically from request for surgery [id number]      STORMY (obstructive sleep apnea) 01/27/2020     Priority: Medium     1/2019 (241#)-AHI 24, lowest oxygen saturation was 86%, no periodic limb movement were noted, CPAP 8 cm/H20 was effective.      Secondary lymphedema 01/27/2020     Priority: Medium    Chronic neutrophilia 01/27/2020     Priority: Medium    Cervical high risk HPV (human papillomavirus) test positive 12/13/2019     Priority: Medium     2011 NIL pap.  2015 NIL pap, Neg HPV.  12/13/19 NIL pap , + HR HPV 16. Plan colp.   1/6/19 Failed colp exam secondary to anatomic constraints with gyn. Referred to gyn/onc.   2/25/20 Colpo bx and ECC Negative for dysplasia. Plan cotest in 1 year.   8/20/21 NIL pap, Neg HPV. Plan cotest in 1 year per provider.   9/28/22 Lost to follow-up for pap tracking       Immunosuppressed status (H24) 09/05/2019     Priority: Medium    Ulcerative colitis with complication, unspecified location (H) 09/05/2019     Priority: Medium    Morbid obesity (H) 09/05/2019     Priority: Medium    Arthritis, rheumatoid (H)  11/26/2018     Priority: Medium    Hypocalcemia 05/15/2018     Priority: Medium    Anemia 05/14/2018     Priority: Medium    Menstrual irregularity 05/14/2018     Priority: Medium    Arthralgia of both knees 05/12/2018     Priority: Medium     Formatting of this note might be different from the original.  Work-up in progress. There is concern for inflammatory arthritis.      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     Priority: Medium    Malignant melanoma of left upper extremity including shoulder (H) 10/12/2017     Priority: Medium    Metastatic malignant melanoma (H) 10/10/2017     Priority: Medium    Prothrombin mutation (H24) 04/05/2017     Priority: Medium     On daily aspirin 81 mg per hematology's recommendations from 2008      Vision changes 04/01/2017     Priority: Medium    Mild intermittent asthma without complication 11/08/2013     Priority: Medium    Moderate major depression (H) 06/24/2013     Priority: Medium    Anxiety state 09/13/2012     Priority: Medium     Problem list name updated by automated process. Provider to review      Esophageal reflux 09/13/2012     Priority: Medium    DUB (dysfunctional uterine bleeding) 07/28/2011     Priority: Medium    CARDIOVASCULAR SCREENING; LDL GOAL LESS THAN 160 07/28/2011     Priority: Low        Past Medical History:    Past Medical History:   Diagnosis Date    Abnormal MRI     Anxiety     Basal cell carcinoma     Cervical high risk  HPV (human papillomavirus) test positive 2019    Colitis     Depression     Diabetes mellitus, iatrogenic (H) 2020    Esophageal reflux     Inflammatory arthritis     Insomnia     Intestinal giardiasis 2018    Lumbago     Lymphedema     Malignant melanoma (H)     Melanoma (H) 10/23/2017    Migraines     Mild persistent asthma     Morbid obesity with BMI of 40.0-44.9, adult (H)     STORMY (obstructive sleep apnea)     Prothrombin deficiency (H)     Stroke (cerebrum) (H)     TIA (transient ischemic attack)     Type 2 diabetes mellitus (H)     Ulcerative pancolitis (H)        Past Surgical History:    Past Surgical History:   Procedure Laterality Date    APPENDECTOMY      COLONOSCOPY N/A 10/18/2017    Procedure: COLONOSCOPY;  Colon;  Surgeon: Debbie Stephens MD;  [facility]    COLONOSCOPY N/A 2018    Procedure: COMBINED COLONOSCOPY, SINGLE OR MULTIPLE BIOPSY/POLYPECTOMY BY BIOPSY;  colon;  Surgeon: Benita Schumacher MD;  Location: [facility]    COLONOSCOPY      multiple since  to present - about 6 total    DAVINCI ASSISTED TRANSANAL TOTAL MESORECTAL EXCISION N/A 2023    Procedure: COMPLETION PROCTECTOMY, ROBOT-ASSISTED, ILEAL POUCH ANASTAMOSIS;  Surgeon: Quinton Ram MD;  Location: [facility] OR    DISSECT LYMPH NODE AXILLA Left 10/23/2017    Procedure: DISSECT LYMPH NODE AXILLA;  Left Axillary Lymph Node Dissection ;  Surgeon: Laurent Cool MD;  Location: [facility]    EXAM UNDER ANESTHESIA PELVIC N/A 2020    Procedure: EXAM UNDER ANESTHESIA, PELVIS; with Cervical Biopsies, Vaginal Biopsy and Endocervical Curettings;  Surgeon: Melina Jung MD;  Location: [facility] OR    GYN SURGERY  ,         LAPAROSCOPIC ASSISTED COLECTOMY N/A 06/15/2021    Procedure: laparoscopic total abdominal colectomy, end ileostomy;  Surgeon: Quinton Ram MD;  Location: [facility]    REPAIR MOHS Left 2017    Procedure: REPAIR MOHS;  Left Upper Lid Moh's Reconstruction;   Surgeon: Kisha Bosch MD;  [facility]: [facility]    SIGMOIDOSCOPY FLEXIBLE N/A 2023    Procedure: Sigmoidoscopy flexible;  Surgeon: Quinton Ram MD;  Location: [facility]       Family History:    Family History   Problem Relation Age of Onset    Cancer Mother 45        lung    Neurologic Disorder Mother         epilepsy    Lipids Father     Gastrointestinal Disease Father         diverticulitis     Depression Father     Colitis Father     Colon Cancer Father     Diverticulitis Father     Depression Sister     Cancer Maternal Grandmother     Blood Disease Maternal Grandmother         lymphoma     Arthritis Maternal Grandmother     Diabetes Maternal Grandmother     Depression Maternal Grandmother     Macular Degeneration Maternal Grandmother     Glaucoma Maternal Grandmother     Diabetes Maternal Grandfather     Cerebrovascular Disease Maternal Grandfather     Blood Disease Maternal Grandfather     Heart Disease Maternal Grandfather     Glaucoma Maternal Grandfather     Cancer Paternal Grandmother     Cancer - colorectal Paternal Grandmother     Colitis Paternal Grandmother     Colon Cancer Paternal Grandmother     Diverticulitis Paternal Grandmother     Respiratory Paternal Grandfather         emphysema     Colitis Paternal Grandfather     Colon Cancer Paternal Grandfather     Diverticulitis Paternal Grandfather     Heart Disease Daughter     Asthma Daughter     Melanoma No family hx of     Anesthesia Reaction No family hx of     Clotting Disorder No family hx of        Social History:  Marital Status:   [4]  Social History     Tobacco Use    Smoking status: Former     Packs/day: 1.00     Years: 5.00     Additional pack years: 0.00     Total pack years: 5.00     Types: Cigarettes     Quit date: 3/20/1998     Years since quittin.7    Smokeless tobacco: Never   Vaping Use    Vaping Use: Never used   Substance Use Topics    Alcohol use: Not Currently    Drug use: Yes     Types: Marijuana      "Comment: vape, edible        Medications:    acetaminophen (TYLENOL) 500 MG tablet  albuterol (PROAIR HFA/PROVENTIL HFA/VENTOLIN HFA) 108 (90 Base) MCG/ACT inhaler  ARIPiprazole (ABILIFY) 2 MG tablet  artificial saliva (BIOTENE MT) SOLN solution  benzocaine-menthol (CHLORASEPTIC MAX) 15-10 MG lozenge  cholestyramine (QUESTRAN) 4 g packet  Cyanocobalamin (B-12 PO)  diphenoxylate-atropine (LOMOTIL) 2.5-0.025 MG tablet  famotidine (PEPCID) 20 MG tablet  fluconazole (DIFLUCAN) 150 MG tablet  guaiFENesin-dextromethorphan (ROBITUSSIN DM) 100-10 MG/5ML syrup  hydrOXYzine (ATARAX) 25 MG tablet  loperamide (IMODIUM) 2 MG capsule  magic mouthwash suspension, diphenhydrAMINE, lidocaine, aluminum-magnesium & simethicone, (FIRST-MOUTHWASH BLM) compounding kit  medical cannabis (Patient's own supply)  medical cannabis (Patient's own supply)  metFORMIN (GLUCOPHAGE) 500 MG tablet  methocarbamol (ROBAXIN) 750 MG tablet  nitroFURantoin macrocrystal-monohydrate (MACROBID) 100 MG capsule  ondansetron (ZOFRAN ODT) 4 MG ODT tab  Ostomy Supplies MISC  psyllium (METAMUCIL/KONSYL) Packet  sucralfate (CARAFATE) 1 GM/10ML suspension  terconazole (TERAZOL 7) 0.4 % vaginal cream  triamcinolone (KENALOG) 0.1 % paste  venlafaxine (EFFEXOR) 75 MG tablet          Review of Systems    Physical Exam   BP: 130/89  Pulse: 102  Temp: 98.5  F (36.9  C)  Resp: 18  Height: 160 cm (5' 3\")  Weight: 82.6 kg (182 lb)  SpO2: 99 %      Physical Exam  Nontoxic appearing no respiratory distress alert and oriented ×3, tearful at times  Skin pale warm and dry  Head atraumatic normocephalic   Neck supple full active painless range of motion  Lungs clear to auscultation  Heart regular no murmur  Abdomen soft nontender bowel sounds positive, ostomy shows green semiformed output  Strength and sensation grossly intact throughout the extremities, gait and station normal  Speech is fluent, good eye contact, thought processes are rational  Lower extremities without " swelling, redness or tenderness  Pedal pulses symmetrical and strong    ED Course                 Procedures                Results for orders placed or performed during the hospital encounter of 11/25/23 (from the past 24 hour(s))   CBC with platelets differential    Narrative    The following orders were created for panel order CBC with platelets differential.  Procedure                               Abnormality         Status                     ---------                               -----------         ------                     CBC with platelets and d...[[id number]]  Abnormal            Final result                 Please view results for these tests on the individual orders.   D dimer quantitative   Result Value Ref Range    D-Dimer Quantitative 0.39 0.00 - 0.50 ug/mL FEU    Narrative    This D-dimer assay is intended for use in conjunction with a clinical pretest probability assessment model to exclude pulmonary embolism (PE) and deep venous thrombosis (DVT) in outpatients suspected of PE or DVT. The cut-off value is 0.50 ug/mL FEU.   Comprehensive metabolic panel   Result Value Ref Range    Sodium 134 (L) 135 - 145 mmol/L    Potassium 4.4 3.4 - 5.3 mmol/L    Carbon Dioxide (CO2) 20 (L) 22 - 29 mmol/L    Anion Gap 17 (H) 7 - 15 mmol/L    Urea Nitrogen 18.2 6.0 - 20.0 mg/dL    Creatinine 0.80 0.51 - 0.95 mg/dL    GFR Estimate >90 >60 mL/min/1.73m2    Calcium 10.3 (H) 8.6 - 10.0 mg/dL    Chloride 97 (L) 98 - 107 mmol/L    Glucose 87 70 - 99 mg/dL    Alkaline Phosphatase 180 (H) 40 - 150 U/L    AST 60 (H) 0 - 45 U/L    ALT 82 (H) 0 - 50 U/L    Protein Total 8.0 6.4 - 8.3 g/dL    Albumin 4.6 3.5 - 5.2 g/dL    Bilirubin Total 0.2 <=1.2 mg/dL   Troponin T, High Sensitivity   Result Value Ref Range    Troponin T, High Sensitivity 8 <=14 ng/L   Nt probnp inpatient (BNP)   Result Value Ref Range    N terminal Pro BNP Inpatient <36 0 - 450 pg/mL   CBC with platelets and differential   Result Value Ref Range    WBC  Count 11.8 (H) 4.0 - 11.0 10e3/uL    RBC Count 4.66 3.80 - 5.20 10e6/uL    Hemoglobin 12.8 11.7 - 15.7 g/dL    Hematocrit 38.8 35.0 - 47.0 %    MCV 83 78 - 100 fL    MCH 27.5 26.5 - 33.0 pg    MCHC 33.0 31.5 - 36.5 g/dL    RDW 16.7 (H) 10.0 - 15.0 %    Platelet Count 419 150 - 450 10e3/uL    % Neutrophils 59 %    % Lymphocytes 31 %    % Monocytes 6 %    % Eosinophils 2 %    % Basophils 1 %    % Immature Granulocytes 1 %    NRBCs per 100 WBC 0 <1 /100    Absolute Neutrophils 7.0 1.6 - 8.3 10e3/uL    Absolute Lymphocytes 3.6 0.8 - 5.3 10e3/uL    Absolute Monocytes 0.7 0.0 - 1.3 10e3/uL    Absolute Eosinophils 0.2 0.0 - 0.7 10e3/uL    Absolute Basophils 0.1 0.0 - 0.2 10e3/uL    Absolute Immature Granulocytes 0.1 <=0.4 10e3/uL    Absolute NRBCs 0.0 10e3/uL   CT Chest Pulmonary Embolism w Contrast    Narrative    EXAM: CT CHEST PULMONARY EMBOLISM W CONTRAST  LOCATION: [facility]  DATE: 11/25/2023    INDICATION: Tachypnea tachycardia prothrombin deficiency. History of metastatic disease.  COMPARISON: 10/23/2023 and 10/30/2023  TECHNIQUE: CT chest pulmonary angiogram during arterial phase injection of IV contrast. Multiplanar reformats and MIP reconstructions were performed. Dose reduction techniques were used.   CONTRAST: 79mL isovue 370    FINDINGS:  ANGIOGRAM CHEST: Pulmonary arteries are normal caliber and negative for pulmonary emboli. Thoracic aorta is negative for dissection. No CT evidence of right heart strain.    LUNGS AND PLEURA: Normal.    MEDIASTINUM/AXILLAE: Normal.    CORONARY ARTERY CALCIFICATION: None.    UPPER ABDOMEN: Geographic low-attenuation focus posterior superior right lobe of liver may relate to geographic fatty infiltration, on most recent exam this same area demonstrated relative fatty sparing while now perhaps demonstrates focal fatty   infiltration. Similar smaller focus of low-attenuation anterior left lobe of liver again may relate to focal fatty  infiltration.    MUSCULOSKELETAL: Normal.      Impression    IMPRESSION:  1.  No pulmonary emboli. No etiology for symptoms evident.  2.  Geographic low-attenuation posterior superior right lobe of liver may relate to geographic fatty infiltration. Consider follow-up nonemergent hepatic MRI.     *Note: Due to a large number of results and/or encounters for the requested time period, some results have not been displayed. A complete set of results can be found in Results Review.       Medications   iopamidol (ISOVUE-370) solution 79 mL (79 mLs Intravenous $Given 11/25/23 2009)   sodium chloride 0.9 % bag 500mL for CT scan flush use (100 mLs Intravenous $Given 11/25/23 2009)   sodium chloride 0.9% BOLUS 1,000 mL (1,000 mLs Intravenous $New Bag 11/25/23 2056)       Assessments & Plan (with Medical Decision Making)  This 48-year-old female with history of malignant melanoma, developed ulcerative colitis secondary to treatment of malignant melanoma, has had difficulties with post colectomy high output and malnutrition.  Recent admission [facility], discharge summary reviewed in epic at time presentation presents with palpitations shortness of air with exertion.  Broad differential considered included but not limited to pneumonia, heart failure, pericardial effusion, ACS, pulmonary embolism versus other.  CT scan chest as above is negative for any significant finding.  Patient received IV fluid bolus in the emergency department.  She can go ahead and take the nitrofurantoin that was prescribed for her earlier today.  Await urine culture results.  She is safe for discharged home.  She has close follow-up with providers scheduled.  Continue current medication regimen.  Return criteria reviewed.     I have reviewed the nursing notes.    I have reviewed the findings, diagnosis, plan and need for follow up with the patient.        New Prescriptions    No medications on file       Final diagnoses:   Dyspnea, unspecified type        11/25/2023   [facility] EMERGENCY DEPT       Cezar Keenan MD  11/25/23 0730

## 2023-11-26 NOTE — DISCHARGE INSTRUCTIONS
Continue current medications and follow-up    Return for worsening shortness of air, passing out or any other concern

## 2023-11-27 ENCOUNTER — LAB (OUTPATIENT)
Dept: INFUSION THERAPY | Facility: CLINIC | Age: 48
End: 2023-11-27
Attending: PHYSICIAN ASSISTANT
Payer: COMMERCIAL

## 2023-11-27 ENCOUNTER — NURSE TRIAGE (OUTPATIENT)
Dept: FAMILY MEDICINE | Facility: CLINIC | Age: 48
End: 2023-11-27
Payer: COMMERCIAL

## 2023-11-27 ENCOUNTER — INFUSION THERAPY VISIT (OUTPATIENT)
Dept: INFUSION THERAPY | Facility: CLINIC | Age: 48
End: 2023-11-27
Attending: FAMILY MEDICINE
Payer: COMMERCIAL

## 2023-11-27 ENCOUNTER — PATIENT OUTREACH (OUTPATIENT)
Dept: CARE COORDINATION | Facility: CLINIC | Age: 48
End: 2023-11-27
Payer: COMMERCIAL

## 2023-11-27 DIAGNOSIS — K51.919 ULCERATIVE COLITIS WITH COMPLICATION, UNSPECIFIED LOCATION (H): ICD-10-CM

## 2023-11-27 DIAGNOSIS — R79.89 ELEVATED LFTS: ICD-10-CM

## 2023-11-27 DIAGNOSIS — R19.8 HIGH OUTPUT ILEOSTOMY (H): Primary | ICD-10-CM

## 2023-11-27 DIAGNOSIS — Z93.2 HIGH OUTPUT ILEOSTOMY (H): ICD-10-CM

## 2023-11-27 DIAGNOSIS — Z93.2 HIGH OUTPUT ILEOSTOMY (H): Primary | ICD-10-CM

## 2023-11-27 DIAGNOSIS — R19.8 HIGH OUTPUT ILEOSTOMY (H): ICD-10-CM

## 2023-11-27 LAB
ALBUMIN SERPL BCG-MCNC: 4.5 G/DL (ref 3.5–5.2)
ALP SERPL-CCNC: 165 U/L (ref 40–150)
ALT SERPL W P-5'-P-CCNC: 58 U/L (ref 0–50)
ANION GAP SERPL CALCULATED.3IONS-SCNC: 16 MMOL/L (ref 7–15)
AST SERPL W P-5'-P-CCNC: 38 U/L (ref 0–45)
BILIRUB DIRECT SERPL-MCNC: <0.2 MG/DL (ref 0–0.3)
BILIRUB SERPL-MCNC: 0.2 MG/DL
BUN SERPL-MCNC: 16.4 MG/DL (ref 6–20)
CALCIUM SERPL-MCNC: 10.4 MG/DL (ref 8.6–10)
CHLORIDE SERPL-SCNC: 100 MMOL/L (ref 98–107)
CREAT SERPL-MCNC: 0.77 MG/DL (ref 0.51–0.95)
DEPRECATED HCO3 PLAS-SCNC: 19 MMOL/L (ref 22–29)
EGFRCR SERPLBLD CKD-EPI 2021: >90 ML/MIN/1.73M2
GLUCOSE SERPL-MCNC: 104 MG/DL (ref 70–99)
MAGNESIUM SERPL-MCNC: 1.6 MG/DL (ref 1.7–2.3)
POTASSIUM SERPL-SCNC: 4.4 MMOL/L (ref 3.4–5.3)
PROT SERPL-MCNC: 8.1 G/DL (ref 6.4–8.3)
SODIUM SERPL-SCNC: 135 MMOL/L (ref 135–145)

## 2023-11-27 PROCEDURE — 36415 COLL VENOUS BLD VENIPUNCTURE: CPT

## 2023-11-27 PROCEDURE — 80053 COMPREHEN METABOLIC PANEL: CPT | Performed by: DIETITIAN, REGISTERED

## 2023-11-27 PROCEDURE — 250N000011 HC RX IP 250 OP 636: Mod: JZ | Performed by: SURGERY

## 2023-11-27 PROCEDURE — 250N000011 HC RX IP 250 OP 636: Mod: JZ | Performed by: FAMILY MEDICINE

## 2023-11-27 PROCEDURE — 82248 BILIRUBIN DIRECT: CPT | Performed by: DIETITIAN, REGISTERED

## 2023-11-27 PROCEDURE — 96375 TX/PRO/DX INJ NEW DRUG ADDON: CPT

## 2023-11-27 PROCEDURE — 96365 THER/PROPH/DIAG IV INF INIT: CPT

## 2023-11-27 PROCEDURE — 258N000003 HC RX IP 258 OP 636

## 2023-11-27 PROCEDURE — 84075 ASSAY ALKALINE PHOSPHATASE: CPT | Mod: 91 | Performed by: DIETITIAN, REGISTERED

## 2023-11-27 PROCEDURE — 83735 ASSAY OF MAGNESIUM: CPT | Performed by: DIETITIAN, REGISTERED

## 2023-11-27 RX ORDER — MAGNESIUM SULFATE HEPTAHYDRATE 40 MG/ML
2 INJECTION, SOLUTION INTRAVENOUS ONCE
Status: COMPLETED | OUTPATIENT
Start: 2023-11-27 | End: 2023-11-27

## 2023-11-27 RX ORDER — ALBUTEROL SULFATE 0.83 MG/ML
2.5 SOLUTION RESPIRATORY (INHALATION)
Status: CANCELLED | OUTPATIENT
Start: 2023-11-27

## 2023-11-27 RX ORDER — HEPARIN SODIUM (PORCINE) LOCK FLUSH IV SOLN 100 UNIT/ML 100 UNIT/ML
5 SOLUTION INTRAVENOUS
Status: CANCELLED | OUTPATIENT
Start: 2023-11-27

## 2023-11-27 RX ORDER — MEPERIDINE HYDROCHLORIDE 25 MG/ML
25 INJECTION INTRAMUSCULAR; INTRAVENOUS; SUBCUTANEOUS EVERY 30 MIN PRN
Status: CANCELLED | OUTPATIENT
Start: 2023-11-27

## 2023-11-27 RX ORDER — ALBUTEROL SULFATE 90 UG/1
1-2 AEROSOL, METERED RESPIRATORY (INHALATION)
Status: CANCELLED
Start: 2023-11-27

## 2023-11-27 RX ORDER — ONDANSETRON 2 MG/ML
4 INJECTION INTRAMUSCULAR; INTRAVENOUS EVERY 6 HOURS PRN
Status: CANCELLED
Start: 2023-11-27

## 2023-11-27 RX ORDER — DIPHENHYDRAMINE HYDROCHLORIDE 50 MG/ML
50 INJECTION INTRAMUSCULAR; INTRAVENOUS
Status: CANCELLED
Start: 2023-11-27

## 2023-11-27 RX ORDER — EPINEPHRINE 1 MG/ML
0.3 INJECTION, SOLUTION, CONCENTRATE INTRAVENOUS EVERY 5 MIN PRN
Status: CANCELLED | OUTPATIENT
Start: 2023-11-27

## 2023-11-27 RX ORDER — ONDANSETRON 2 MG/ML
4 INJECTION INTRAMUSCULAR; INTRAVENOUS EVERY 6 HOURS PRN
Status: DISCONTINUED | OUTPATIENT
Start: 2023-11-27 | End: 2023-11-27 | Stop reason: HOSPADM

## 2023-11-27 RX ORDER — HEPARIN SODIUM,PORCINE 10 UNIT/ML
5-20 VIAL (ML) INTRAVENOUS DAILY PRN
Status: CANCELLED | OUTPATIENT
Start: 2023-11-27

## 2023-11-27 RX ORDER — METHYLPREDNISOLONE SODIUM SUCCINATE 125 MG/2ML
125 INJECTION, POWDER, LYOPHILIZED, FOR SOLUTION INTRAMUSCULAR; INTRAVENOUS
Status: CANCELLED
Start: 2023-11-27

## 2023-11-27 RX ADMIN — MAGNESIUM SULFATE HEPTAHYDRATE 2 G: 2 INJECTION, SOLUTION INTRAVENOUS at 15:04

## 2023-11-27 RX ADMIN — ONDANSETRON 4 MG: 2 INJECTION INTRAMUSCULAR; INTRAVENOUS at 14:19

## 2023-11-27 RX ADMIN — SODIUM CHLORIDE 1000 ML: 9 INJECTION, SOLUTION INTRAVENOUS at 14:17

## 2023-11-27 NOTE — TELEPHONE ENCOUNTER
The earliest I can see her is tomorrow - the 1200 appointment arrival (end of my morning) would be best.    If she cannot wait this long, could see if there is access with another provider.    Should get the IV fluid infusion she is scheduled for today as dehydration can cause tachycardia and that would help.    Anna Naidu M.D.

## 2023-11-27 NOTE — PROGRESS NOTES
Clinic Care Coordination Contact  Follow Up Progress Note      Assessment: The RN CC nurse care coordinator contacted the patient by phone for an ED follow up call.  The patient was seen in the ED for shortness of breath with standing or walking.  Walking to the bathroom increased her pulse rate to 155.  The patient does not have an upcoming appointment with her PCP.  The RN CC suggested that she call the clinic and ask for the triage nurse.  The patient will do that to see about an appointment for today.  The RN CC explained the importance of walking even at a slower pace to help prevent blood clots.  The patient states understanding and will also speak with the nurse when she has her infusion of fluids today.       Care Gaps:    Health Maintenance Due   Topic Date Due    DIABETIC FOOT EXAM  Never done    Pneumococcal Vaccine: Pediatrics (0 to 5 Years) and At-Risk Patients (6 to 64 Years) (1 - PCV) Never done    YEARLY PREVENTIVE VISIT  03/02/2016    EYE EXAM  10/12/2018    ASTHMA ACTION PLAN  01/28/2020    DTAP/TDAP/TD IMMUNIZATION (7 - Td or Tdap) 04/09/2022    PAP FOLLOW-UP  08/20/2022    HPV FOLLOW-UP  08/20/2022    URINE DRUG SCREEN  05/18/2023    MAMMO SCREENING  05/31/2023    INFLUENZA VACCINE (1) 09/01/2023    COVID-19 Vaccine (4 - 2023-24 season) 09/01/2023       Care Gaps Last addressed on 11/27/23    Care Plans  Care Plan: General       Problem: HP GENERAL PROBLEM       Goal: I will have a good plan for wound care in the next week       Start Date: 9/15/2023 Expected End Date: 11/30/2023    This Visit's Progress: 70% Recent Progress: 70%    Note:     Barriers: Anxiety  Unable to find home care services   Strengths: lives with daughter   Patient expressed understanding of goal: Yes   Action steps to achieve this goal:  1. I will have a Community Paramedic visit Not needed   2. Care coordinator will call surgeons office and ostomy RN for supplies   3. I will keep wound clinic appointments   4. I will keep  IV hydration appointments  5. I will continue to measure Ostomy output                               Intervention/Education provided during outreach: The RN CC spoke with the patient regarding walking to assist in preventing blood clots.     Outreach Frequency: weekly, more frequently as needed        Plan:    The patient will reach out to the triage nurse at [facility] to get an appointment with a provider today or tomorrow.  The patient will continue to get her infusions for fluids to assist in the dehydration.  The patient will follow the directions from the surgeon's office in reference to the high output from the ostomy.    RN CC Nurse Care Coordinator will follow up in 2 days.        Rhys Dominguez MSN, RN, PHN, CCM / covering for Michelle Marte RN (phone [phone])  Primary Care Clinical RN Care Coordinator  [facility]  11/27/2023   11:20 AM  [email or web address]  Office: [phone]

## 2023-11-27 NOTE — TELEPHONE ENCOUNTER
"Nurse Triage SBAR    Is this a 2nd Level Triage? YES    Situation: RN received priority call for patient having high heart rate. Patient reports current heart rate at rest is 115 bpm. She states when she gets up and walks 10 feet to from her bed to the bathroom, it goes up to 150 bpm. She denies chest pain or shortness of breath.    She states she feels like she might pass out, but denies fainting. She states she almost fainted yesterday but caught herself.     She states she feels dehydrated and is malnourished. She has an infusion today at 12:45 pm. She barely eats, but has been trying to eat small meals, bananas, peanut butter, crackers, and cheeses per the recommendation of a Nutritionist she saw last week. She was advised to lay off water and push electrolyte drinks like \"Drip Drop\" per GI.    She has been nauseated \"24/7\" and vomits at times, last time was yesterday. She denies blood in her urine.     She denies blood in ostomy output - output today is yellow/brown. She states she is putting out 1500 ml a day, sometimes more.    Background:   Patient states she has been sick since surgery 9/5/23.     Patient went to urgent care 11/25/2. She then was  in ED 11/25/23 with dyspnea on exertion and palpitations. Please see visit notes. Ruled out PE.     She has contacted the surgery team, who told her to get checked by PCP.      Assessment: RN advised patient to return to ED. She states she does not want to return as they just sent her home last time she went. She would like her PCP to advise and she would like an appointment with PCP.    She states she is home alone, her daughter is at school. RN advised if she gets worse to call 911. She verbalized understanding.    Protocol Recommended Disposition:   Go to ED Now    Recommendation: Please advise if patient can be seen in clinic. Thank you.     Routed to provider    Does the patient meet one of the following criteria for ADS visit consideration? 16+ years old, " "with an [facility] PCP     TIP  Providers, please consider if this condition is appropriate for management at one of our Acute and Diagnostic Services sites.     If patient is a good candidate, please use dotphrase <dot>triageresponse and select Refer to ADS to document.    Emilia Morrison, RN, BSN, PHN  [facility]  Nurse Triage, [facility]    Reason for Disposition   Dizziness, lightheadedness, or weakness    Additional Information   Negative: Received SHOCK from implantable cardiac defibrillator and has persisting symptoms (i.e., palpitations, lightheadedness)   Negative: Visible sweat on face or sweat dripping down face   Negative: Unable to walk, or can only walk with assistance (e.g., requires support)   Negative: Passed out (i.e., lost consciousness, collapsed and was not responding)   Negative: Shock suspected (e.g., cold/pale/clammy skin, too weak to stand, low BP, rapid pulse)   Negative: Difficult to awaken or acting confused (e.g., disoriented, slurred speech)   Negative: Dizziness, lightheadedness, or weakness and heart beating very rapidly (e.g., > 140 / minute)   Negative: Dizziness, lightheadedness, or weakness and heart beating very slowly (e.g., < 50 / minute)   Negative: Sounds like a life-threatening emergency to the triager   Negative: Chest pain   Negative: Difficulty breathing    Answer Assessment - Initial Assessment Questions  1. DESCRIPTION: \"Please describe your heart rate or heartbeat that you are having\" (e.g., fast/slow, regular/irregular, skipped or extra beats, \"palpitations\")      115 bpm at rest, 150 with activity.  2. ONSET: \"When did it start?\" (Minutes, hours or days)       Has been going on for \"over a week\" and getting worse  3. DURATION: \"How long does it last\" (e.g., seconds, minutes, hours)      Stays high  4. PATTERN \"Does it come and go, or has it been constant since it started?\"  \"Does it get worse with exertion?\"   \"Are you feeling it now?\"      " "Gets worse with activity. Feels lightheaded and dizzy constantly. Always feels like she could \"pass out\". She has not fainted but has \"almost fainted\" but \"caught\" herself. Last time was yesterday.   5. TAP: \"Using your hand, can you tap out what you are feeling on a chair or table in front of you, so that I can hear?\" (Note: not all patients can do this)        Patient too upset to do this.  6. HEART RATE: \"Can you tell me your heart rate?\" \"How many beats in 15 seconds?\"  (Note: not all patients can do this)        115 bpm per monitor  7. RECURRENT SYMPTOM: \"Have you ever had this before?\" If Yes, ask: \"When was the last time?\" and \"What happened that time?\"       Just this past week  8. CAUSE: \"What do you think is causing the palpitations?\"      Not sure  9. CARDIAC HISTORY: \"Do you have any history of heart disease?\" (e.g., heart attack, angina, bypass surgery, angioplasty, arrhythmia)       *No Answer*  10. OTHER SYMPTOMS: \"Do you have any other symptoms?\" (e.g., dizziness, chest pain, sweating, difficulty breathing)        Dizziness, light headedness, feeling faint  11. PREGNANCY: \"Is there any chance you are pregnant?\" \"When was your last menstrual period?\"        no    Protocols used: Heart Rate and Heartbeat [id number]    "

## 2023-11-27 NOTE — TELEPHONE ENCOUNTER
Patient Contact     Attempt # 1     Was call answered?  No.  Left message on voicemail with information to call back.         Rosemary Borden RN on 11/27/2023 at 11:37 AM

## 2023-11-27 NOTE — PROGRESS NOTES
Infusion Nursing Note:  Doretha Yu presents today for IVF and possible mag.    Patient seen by provider today: No   present during visit today: Not Applicable.    Note: Pt is a hard IV start. IV left in right wrist.      Intravenous Access:  Labs drawn without difficulty.  Peripheral IV placed.    Treatment Conditions:  Results reviewed, labs MET treatment parameters, ok to proceed with treatment.  Mag 1.6. Will replace with 2g of iv mag.       Post Infusion Assessment:  Patient tolerated infusion without incident.  Patient tolerated injection without incident.  Site patent and intact, free from redness, edema or discomfort.  No evidence of extravasations.       Discharge Plan:   Discharge instructions reviewed with: Patient.  Patient discharged in stable condition accompanied by: self.  Departure Mode: Ambulatory.      Tiffany Pace RN

## 2023-11-28 ENCOUNTER — MYC MEDICAL ADVICE (OUTPATIENT)
Dept: GASTROENTEROLOGY | Facility: CLINIC | Age: 48
End: 2023-11-28

## 2023-11-28 ENCOUNTER — OFFICE VISIT (OUTPATIENT)
Dept: FAMILY MEDICINE | Facility: CLINIC | Age: 48
End: 2023-11-28
Payer: COMMERCIAL

## 2023-11-28 VITALS
BODY MASS INDEX: 32.42 KG/M2 | DIASTOLIC BLOOD PRESSURE: 70 MMHG | TEMPERATURE: 98.7 F | HEART RATE: 98 BPM | OXYGEN SATURATION: 100 % | WEIGHT: 183 LBS | SYSTOLIC BLOOD PRESSURE: 136 MMHG

## 2023-11-28 DIAGNOSIS — R00.0 TACHYCARDIA: ICD-10-CM

## 2023-11-28 DIAGNOSIS — E83.42 HYPOMAGNESEMIA: ICD-10-CM

## 2023-11-28 DIAGNOSIS — R06.09 DOE (DYSPNEA ON EXERTION): ICD-10-CM

## 2023-11-28 DIAGNOSIS — R19.7 DIARRHEA: ICD-10-CM

## 2023-11-28 DIAGNOSIS — D64.9 NORMOCYTIC ANEMIA: ICD-10-CM

## 2023-11-28 DIAGNOSIS — Z93.2 HIGH OUTPUT ILEOSTOMY (H): Primary | ICD-10-CM

## 2023-11-28 DIAGNOSIS — K51.90 ULCERATIVE COLITIS WITHOUT COMPLICATIONS, UNSPECIFIED LOCATION (H): ICD-10-CM

## 2023-11-28 DIAGNOSIS — R19.8 HIGH OUTPUT ILEOSTOMY (H): Primary | ICD-10-CM

## 2023-11-28 DIAGNOSIS — E13.9 DIABETES MELLITUS, IATROGENIC (H): ICD-10-CM

## 2023-11-28 DIAGNOSIS — B37.31 YEAST INFECTION OF THE VAGINA: ICD-10-CM

## 2023-11-28 PROCEDURE — 99214 OFFICE O/P EST MOD 30 MIN: CPT | Mod: 25 | Performed by: FAMILY MEDICINE

## 2023-11-28 PROCEDURE — 93000 ELECTROCARDIOGRAM COMPLETE: CPT | Performed by: FAMILY MEDICINE

## 2023-11-28 RX ORDER — FLUCONAZOLE 150 MG/1
150 TABLET ORAL
Qty: 3 TABLET | Refills: 0 | Status: SHIPPED | OUTPATIENT
Start: 2023-11-28 | End: 2023-12-05

## 2023-11-28 ASSESSMENT — PATIENT HEALTH QUESTIONNAIRE - PHQ9
10. IF YOU CHECKED OFF ANY PROBLEMS, HOW DIFFICULT HAVE THESE PROBLEMS MADE IT FOR YOU TO DO YOUR WORK, TAKE CARE OF THINGS AT HOME, OR GET ALONG WITH OTHER PEOPLE: EXTREMELY DIFFICULT
SUM OF ALL RESPONSES TO PHQ QUESTIONS 1-9: 18
SUM OF ALL RESPONSES TO PHQ QUESTIONS 1-9: 18

## 2023-11-28 NOTE — TELEPHONE ENCOUNTER
Attempted to contact the patient via telephone, however no answer. No option to leave voicemail, did attempt to dial out twice.     Sent [employer] message.

## 2023-11-29 ENCOUNTER — PATIENT OUTREACH (OUTPATIENT)
Dept: CARE COORDINATION | Facility: CLINIC | Age: 48
End: 2023-11-29
Payer: COMMERCIAL

## 2023-11-29 ENCOUNTER — INFUSION THERAPY VISIT (OUTPATIENT)
Dept: INFUSION THERAPY | Facility: CLINIC | Age: 48
End: 2023-11-29
Attending: FAMILY MEDICINE
Payer: COMMERCIAL

## 2023-11-29 DIAGNOSIS — Z93.2 HIGH OUTPUT ILEOSTOMY (H): Primary | ICD-10-CM

## 2023-11-29 DIAGNOSIS — R19.8 HIGH OUTPUT ILEOSTOMY (H): Primary | ICD-10-CM

## 2023-11-29 PROCEDURE — 96361 HYDRATE IV INFUSION ADD-ON: CPT

## 2023-11-29 PROCEDURE — 250N000009 HC RX 250: Performed by: PHYSICIAN ASSISTANT

## 2023-11-29 PROCEDURE — 258N000003 HC RX IP 258 OP 636

## 2023-11-29 PROCEDURE — 250N000011 HC RX IP 250 OP 636: Mod: JZ | Performed by: SURGERY

## 2023-11-29 PROCEDURE — 96374 THER/PROPH/DIAG INJ IV PUSH: CPT

## 2023-11-29 RX ORDER — ALBUTEROL SULFATE 90 UG/1
1-2 AEROSOL, METERED RESPIRATORY (INHALATION)
Status: CANCELLED
Start: 2023-11-29

## 2023-11-29 RX ORDER — MEPERIDINE HYDROCHLORIDE 25 MG/ML
25 INJECTION INTRAMUSCULAR; INTRAVENOUS; SUBCUTANEOUS EVERY 30 MIN PRN
Status: CANCELLED | OUTPATIENT
Start: 2023-11-29

## 2023-11-29 RX ORDER — ONDANSETRON 2 MG/ML
4 INJECTION INTRAMUSCULAR; INTRAVENOUS EVERY 6 HOURS PRN
Status: DISCONTINUED | OUTPATIENT
Start: 2023-11-29 | End: 2023-11-29 | Stop reason: HOSPADM

## 2023-11-29 RX ORDER — METHYLPREDNISOLONE SODIUM SUCCINATE 125 MG/2ML
125 INJECTION, POWDER, LYOPHILIZED, FOR SOLUTION INTRAMUSCULAR; INTRAVENOUS
Status: CANCELLED
Start: 2023-11-29

## 2023-11-29 RX ORDER — DIPHENHYDRAMINE HYDROCHLORIDE 50 MG/ML
50 INJECTION INTRAMUSCULAR; INTRAVENOUS
Status: CANCELLED
Start: 2023-11-29

## 2023-11-29 RX ORDER — EPINEPHRINE 1 MG/ML
0.3 INJECTION, SOLUTION, CONCENTRATE INTRAVENOUS EVERY 5 MIN PRN
Status: CANCELLED | OUTPATIENT
Start: 2023-11-29

## 2023-11-29 RX ORDER — LIDOCAINE HYDROCHLORIDE 10 MG/ML
1 INJECTION, SOLUTION EPIDURAL; INFILTRATION; INTRACAUDAL; PERINEURAL ONCE
Status: COMPLETED | OUTPATIENT
Start: 2023-11-29 | End: 2023-11-29

## 2023-11-29 RX ORDER — ONDANSETRON 2 MG/ML
4 INJECTION INTRAMUSCULAR; INTRAVENOUS EVERY 6 HOURS PRN
Status: CANCELLED
Start: 2023-11-29

## 2023-11-29 RX ORDER — ALBUTEROL SULFATE 0.83 MG/ML
2.5 SOLUTION RESPIRATORY (INHALATION)
Status: CANCELLED | OUTPATIENT
Start: 2023-11-29

## 2023-11-29 RX ORDER — HEPARIN SODIUM (PORCINE) LOCK FLUSH IV SOLN 100 UNIT/ML 100 UNIT/ML
5 SOLUTION INTRAVENOUS
Status: CANCELLED | OUTPATIENT
Start: 2023-11-29

## 2023-11-29 RX ORDER — HEPARIN SODIUM,PORCINE 10 UNIT/ML
5-20 VIAL (ML) INTRAVENOUS DAILY PRN
Status: CANCELLED | OUTPATIENT
Start: 2023-11-29

## 2023-11-29 RX ADMIN — ONDANSETRON 4 MG: 2 INJECTION INTRAMUSCULAR; INTRAVENOUS at 14:50

## 2023-11-29 RX ADMIN — LIDOCAINE HYDROCHLORIDE 1 ML: 10 INJECTION, SOLUTION EPIDURAL; INFILTRATION; INTRACAUDAL; PERINEURAL at 14:48

## 2023-11-29 RX ADMIN — SODIUM CHLORIDE 1000 ML: 9 INJECTION, SOLUTION INTRAVENOUS at 14:49

## 2023-11-29 ASSESSMENT — ACTIVITIES OF DAILY LIVING (ADL): DEPENDENT_IADLS:: INDEPENDENT

## 2023-11-29 NOTE — PROGRESS NOTES
Infusion Nursing Note:  Doretha Yu presents today for IVF.    Patient seen by provider today: No   present during visit today: Not Applicable.    Note: SL IV R wrist not patent. IV pulled and restarted prior to infusion. Will SL new IV for infusion Friday.      Intravenous Access:  Peripheral IV placed.    Treatment Conditions:  Not Applicable.      Post Infusion Assessment:  Patient tolerated infusion without incident.  Blood return noted pre and post infusion.  Site patent and intact, free from redness, edema or discomfort.  No evidence of extravasations.       Discharge Plan:   Discharge instructions reviewed with: Patient.  Patient and/or family verbalized understanding of discharge instructions and all questions answered.  Patient discharged in stable condition accompanied by: self.  Departure Mode: Ambulatory.      hCelsea Rodriguez RN

## 2023-11-29 NOTE — PROGRESS NOTES
Clinic Care Coordination Contact  [facility]/Voicemail    Clinical Data: Care Coordinator Outreach    Outreach Documentation Number of Outreach Attempt   10/27/2023  11:15 AM 1   11/29/2023  12:58 PM 1       Left message on patient's voicemail with call back information and requested return call. Left a message writer can see she had a visit with PCP and to call writer back with cindyy further questions or cocerrns     Plan: . Care Coordinator will try to reach patient again in 10 business days.    [facility]   Gisel Marte RN, Care Coordinator   [facility]'s   E-mail [email or web address]   [phone]

## 2023-11-30 ENCOUNTER — MYC MEDICAL ADVICE (OUTPATIENT)
Dept: INFUSION THERAPY | Facility: CLINIC | Age: 48
End: 2023-11-30
Payer: COMMERCIAL

## 2023-11-30 LAB
ALP BONE SERPL-CCNC: NORMAL U/L
ALP LIVER SERPL-CCNC: NORMAL U/L
ALP OTHER SERPL-CCNC: NORMAL U/L
ALP SERPL-CCNC: NORMAL U/L

## 2023-11-30 ASSESSMENT — ACTIVITIES OF DAILY LIVING (ADL): DEPENDENT_IADLS:: INDEPENDENT

## 2023-11-30 NOTE — PROGRESS NOTES
Clinic Care Coordination Contact  Follow Up Progress Note     10/30/2023 - 11/8/2023 (9 days)  [facility]     Madeline Easton MD  Last attending  Treatment team High output ileostomy (H)  Principal problem   Discharge Diagnoses  Nivolumab induced UC  Increased ostomy output  Severe protein calorie malnutrition   Pneumonia   Hypomagnesemia   Aphthous ulcers   Hyponatremia   Chronic Transaminitis   Depression  Anxiety  DM2   Hypothyroidism   Normocytic Anemia   STORMY   Chronic Pain   Prothrombin Mutation   Melanoma history      Assessment: Patient reports she feels good on the days of her IV fluids but the weekends are tough.  Patient was notified By Gastroenterologist she needs a Liver MRI.  Patient will call that office and set up that appointment   Patient FMLA has run out and now will need to go on short term disability   Patient has been out of work since her surgery in December     Care Gaps:    Health Maintenance Due   Topic Date Due    DIABETIC FOOT EXAM  Never done    Pneumococcal Vaccine: Pediatrics (0 to 5 Years) and At-Risk Patients (6 to 64 Years) (1 - PCV) Never done    YEARLY PREVENTIVE VISIT  03/02/2016    EYE EXAM  10/12/2018    ASTHMA ACTION PLAN  01/28/2020    DTAP/TDAP/TD IMMUNIZATION (7 - Td or Tdap) 04/09/2022    PAP FOLLOW-UP  08/20/2022    HPV FOLLOW-UP  08/20/2022    URINE DRUG SCREEN  05/18/2023    MAMMO SCREENING  05/31/2023    INFLUENZA VACCINE (1) 09/01/2023    COVID-19 Vaccine (4 - 2023-24 season) 09/01/2023       Will address when she is feeling stronger     Care Plans  Care Plan: General       Problem: HP GENERAL PROBLEM       Goal: I will have a good plan for wound care in the next week       Start Date: 9/15/2023 Expected End Date: 11/30/2023    This Visit's Progress: 70% Recent Progress: 70%    Note:     Barriers: Anxiety  Unable to find home care services   Strengths: lives with daughter   Patient expressed understanding of  goal: Yes   Action steps to achieve this goal:  1. I will have a Community Paramedic visit Not needed   2. Care coordinator will call surgeons office and ostomy RN for supplies   3. I will keep wound clinic appointments   4. I will keep IV hydration appointments  5. I will continue to measure Ostomy output                               Intervention/Education provided during outreach: Continue with good hydration      Outreach Frequency: 2 weeks, more frequently as needed      Plan:     Care Coordinator will follow up in 1-2 weeks     [facility]   Gisel Marte RN, Care Coordinator   [facility]'s   E-mail [email or web address]   [phone]

## 2023-12-01 ENCOUNTER — INFUSION THERAPY VISIT (OUTPATIENT)
Dept: INFUSION THERAPY | Facility: CLINIC | Age: 48
End: 2023-12-01
Attending: PHYSICIAN ASSISTANT
Payer: COMMERCIAL

## 2023-12-01 VITALS — DIASTOLIC BLOOD PRESSURE: 77 MMHG | HEART RATE: 80 BPM | TEMPERATURE: 98 F | SYSTOLIC BLOOD PRESSURE: 112 MMHG

## 2023-12-01 DIAGNOSIS — Z93.2 HIGH OUTPUT ILEOSTOMY (H): Primary | ICD-10-CM

## 2023-12-01 DIAGNOSIS — R19.8 HIGH OUTPUT ILEOSTOMY (H): Primary | ICD-10-CM

## 2023-12-01 PROCEDURE — 96361 HYDRATE IV INFUSION ADD-ON: CPT

## 2023-12-01 PROCEDURE — 96374 THER/PROPH/DIAG INJ IV PUSH: CPT

## 2023-12-01 PROCEDURE — 250N000011 HC RX IP 250 OP 636: Mod: JZ | Performed by: SURGERY

## 2023-12-01 PROCEDURE — 258N000003 HC RX IP 258 OP 636

## 2023-12-01 RX ORDER — HEPARIN SODIUM,PORCINE 10 UNIT/ML
5-20 VIAL (ML) INTRAVENOUS DAILY PRN
Status: CANCELLED | OUTPATIENT
Start: 2023-12-01

## 2023-12-01 RX ORDER — DIPHENHYDRAMINE HYDROCHLORIDE 50 MG/ML
50 INJECTION INTRAMUSCULAR; INTRAVENOUS
Status: CANCELLED
Start: 2023-12-01

## 2023-12-01 RX ORDER — MEPERIDINE HYDROCHLORIDE 25 MG/ML
25 INJECTION INTRAMUSCULAR; INTRAVENOUS; SUBCUTANEOUS EVERY 30 MIN PRN
Status: CANCELLED | OUTPATIENT
Start: 2023-12-01

## 2023-12-01 RX ORDER — HEPARIN SODIUM (PORCINE) LOCK FLUSH IV SOLN 100 UNIT/ML 100 UNIT/ML
5 SOLUTION INTRAVENOUS
Status: CANCELLED | OUTPATIENT
Start: 2023-12-01

## 2023-12-01 RX ORDER — ONDANSETRON 2 MG/ML
4 INJECTION INTRAMUSCULAR; INTRAVENOUS EVERY 6 HOURS PRN
Status: DISCONTINUED | OUTPATIENT
Start: 2023-12-01 | End: 2023-12-01 | Stop reason: HOSPADM

## 2023-12-01 RX ORDER — ALBUTEROL SULFATE 90 UG/1
1-2 AEROSOL, METERED RESPIRATORY (INHALATION)
Status: CANCELLED
Start: 2023-12-01

## 2023-12-01 RX ORDER — ALBUTEROL SULFATE 0.83 MG/ML
2.5 SOLUTION RESPIRATORY (INHALATION)
Status: CANCELLED | OUTPATIENT
Start: 2023-12-01

## 2023-12-01 RX ORDER — ONDANSETRON 2 MG/ML
4 INJECTION INTRAMUSCULAR; INTRAVENOUS EVERY 6 HOURS PRN
Status: CANCELLED
Start: 2023-12-01

## 2023-12-01 RX ORDER — METHYLPREDNISOLONE SODIUM SUCCINATE 125 MG/2ML
125 INJECTION, POWDER, LYOPHILIZED, FOR SOLUTION INTRAMUSCULAR; INTRAVENOUS
Status: CANCELLED
Start: 2023-12-01

## 2023-12-01 RX ORDER — EPINEPHRINE 1 MG/ML
0.3 INJECTION, SOLUTION, CONCENTRATE INTRAVENOUS EVERY 5 MIN PRN
Status: CANCELLED | OUTPATIENT
Start: 2023-12-01

## 2023-12-01 RX ADMIN — SODIUM CHLORIDE 1000 ML: 9 INJECTION, SOLUTION INTRAVENOUS at 11:14

## 2023-12-01 RX ADMIN — ONDANSETRON 4 MG: 2 INJECTION INTRAMUSCULAR; INTRAVENOUS at 11:19

## 2023-12-01 NOTE — PROGRESS NOTES
Infusion Nursing Note:  Doretha Yu presents today for IVF and antiemetics.    Patient seen by provider today: No   present during visit today: Not Applicable.    Note: N/A.    Intravenous Access:  Peripheral IV placed patent.     Treatment Conditions:  Not Applicable.    Post Infusion Assessment:  Patient tolerated infusion without incident.  Site patent and intact, free from redness, edema or discomfort.  Access discontinued per protocol.     Discharge Plan:   Patient discharged in stable condition accompanied by: self.  Departure Mode: Ambulatory.    Jonh Babb RN

## 2023-12-04 ENCOUNTER — INFUSION THERAPY VISIT (OUTPATIENT)
Dept: INFUSION THERAPY | Facility: CLINIC | Age: 48
End: 2023-12-04
Attending: PHYSICIAN ASSISTANT
Payer: COMMERCIAL

## 2023-12-04 ENCOUNTER — MYC MEDICAL ADVICE (OUTPATIENT)
Dept: SURGERY | Facility: CLINIC | Age: 48
End: 2023-12-04
Payer: COMMERCIAL

## 2023-12-04 VITALS — RESPIRATION RATE: 16 BRPM | DIASTOLIC BLOOD PRESSURE: 71 MMHG | HEART RATE: 89 BPM | SYSTOLIC BLOOD PRESSURE: 106 MMHG

## 2023-12-04 DIAGNOSIS — R19.8 HIGH OUTPUT ILEOSTOMY (H): Primary | ICD-10-CM

## 2023-12-04 DIAGNOSIS — K51.919 ULCERATIVE COLITIS WITH COMPLICATION, UNSPECIFIED LOCATION (H): ICD-10-CM

## 2023-12-04 DIAGNOSIS — R30.0 DYSURIA: ICD-10-CM

## 2023-12-04 DIAGNOSIS — Z93.2 HIGH OUTPUT ILEOSTOMY (H): Primary | ICD-10-CM

## 2023-12-04 DIAGNOSIS — R79.89 ELEVATED LFTS: ICD-10-CM

## 2023-12-04 LAB
ANION GAP SERPL CALCULATED.3IONS-SCNC: 11 MMOL/L (ref 7–15)
BUN SERPL-MCNC: 12.9 MG/DL (ref 6–20)
CALCIUM SERPL-MCNC: 10.1 MG/DL (ref 8.6–10)
CHLORIDE SERPL-SCNC: 100 MMOL/L (ref 98–107)
CREAT SERPL-MCNC: 0.71 MG/DL (ref 0.51–0.95)
DEPRECATED HCO3 PLAS-SCNC: 24 MMOL/L (ref 22–29)
EGFRCR SERPLBLD CKD-EPI 2021: >90 ML/MIN/1.73M2
GLUCOSE SERPL-MCNC: 137 MG/DL (ref 70–99)
MAGNESIUM SERPL-MCNC: 1.7 MG/DL (ref 1.7–2.3)
POTASSIUM SERPL-SCNC: 3.9 MMOL/L (ref 3.4–5.3)
SODIUM SERPL-SCNC: 135 MMOL/L (ref 135–145)

## 2023-12-04 PROCEDURE — 80048 BASIC METABOLIC PNL TOTAL CA: CPT | Performed by: PHYSICIAN ASSISTANT

## 2023-12-04 PROCEDURE — 258N000003 HC RX IP 258 OP 636

## 2023-12-04 PROCEDURE — 83735 ASSAY OF MAGNESIUM: CPT | Performed by: PHYSICIAN ASSISTANT

## 2023-12-04 PROCEDURE — 96365 THER/PROPH/DIAG IV INF INIT: CPT

## 2023-12-04 PROCEDURE — 250N000011 HC RX IP 250 OP 636: Mod: JZ | Performed by: PHYSICIAN ASSISTANT

## 2023-12-04 PROCEDURE — 250N000011 HC RX IP 250 OP 636: Mod: JZ | Performed by: SURGERY

## 2023-12-04 PROCEDURE — 96375 TX/PRO/DX INJ NEW DRUG ADDON: CPT

## 2023-12-04 PROCEDURE — 250N000009 HC RX 250: Performed by: PHYSICIAN ASSISTANT

## 2023-12-04 PROCEDURE — 36415 COLL VENOUS BLD VENIPUNCTURE: CPT

## 2023-12-04 RX ORDER — HEPARIN SODIUM,PORCINE 10 UNIT/ML
5-20 VIAL (ML) INTRAVENOUS DAILY PRN
Status: CANCELLED | OUTPATIENT
Start: 2023-12-04

## 2023-12-04 RX ORDER — MEPERIDINE HYDROCHLORIDE 25 MG/ML
25 INJECTION INTRAMUSCULAR; INTRAVENOUS; SUBCUTANEOUS EVERY 30 MIN PRN
Status: CANCELLED | OUTPATIENT
Start: 2023-12-04

## 2023-12-04 RX ORDER — LIDOCAINE HYDROCHLORIDE 10 MG/ML
1 INJECTION, SOLUTION EPIDURAL; INFILTRATION; INTRACAUDAL; PERINEURAL ONCE
Qty: 5 ML | Refills: 0 | Status: COMPLETED | OUTPATIENT
Start: 2023-12-04 | End: 2023-12-04

## 2023-12-04 RX ORDER — DIPHENHYDRAMINE HYDROCHLORIDE 50 MG/ML
50 INJECTION INTRAMUSCULAR; INTRAVENOUS
Status: CANCELLED
Start: 2023-12-04

## 2023-12-04 RX ORDER — MAGNESIUM SULFATE HEPTAHYDRATE 40 MG/ML
2 INJECTION, SOLUTION INTRAVENOUS ONCE
Status: COMPLETED | OUTPATIENT
Start: 2023-12-04 | End: 2023-12-04

## 2023-12-04 RX ORDER — ALBUTEROL SULFATE 0.83 MG/ML
2.5 SOLUTION RESPIRATORY (INHALATION)
Status: CANCELLED | OUTPATIENT
Start: 2023-12-04

## 2023-12-04 RX ORDER — HEPARIN SODIUM (PORCINE) LOCK FLUSH IV SOLN 100 UNIT/ML 100 UNIT/ML
5 SOLUTION INTRAVENOUS
Status: CANCELLED | OUTPATIENT
Start: 2023-12-04

## 2023-12-04 RX ORDER — EPINEPHRINE 1 MG/ML
0.3 INJECTION, SOLUTION, CONCENTRATE INTRAVENOUS EVERY 5 MIN PRN
Status: CANCELLED | OUTPATIENT
Start: 2023-12-04

## 2023-12-04 RX ORDER — ALBUTEROL SULFATE 90 UG/1
1-2 AEROSOL, METERED RESPIRATORY (INHALATION)
Status: CANCELLED
Start: 2023-12-04

## 2023-12-04 RX ORDER — METHYLPREDNISOLONE SODIUM SUCCINATE 125 MG/2ML
125 INJECTION, POWDER, LYOPHILIZED, FOR SOLUTION INTRAMUSCULAR; INTRAVENOUS
Status: CANCELLED
Start: 2023-12-04

## 2023-12-04 RX ORDER — ONDANSETRON 2 MG/ML
4 INJECTION INTRAMUSCULAR; INTRAVENOUS EVERY 6 HOURS PRN
Status: DISCONTINUED | OUTPATIENT
Start: 2023-12-04 | End: 2023-12-04 | Stop reason: HOSPADM

## 2023-12-04 RX ORDER — ONDANSETRON 2 MG/ML
4 INJECTION INTRAMUSCULAR; INTRAVENOUS EVERY 6 HOURS PRN
Status: CANCELLED
Start: 2023-12-04

## 2023-12-04 RX ADMIN — MAGNESIUM SULFATE HEPTAHYDRATE 2 G: 2 INJECTION, SOLUTION INTRAVENOUS at 15:51

## 2023-12-04 RX ADMIN — LIDOCAINE HYDROCHLORIDE 1 ML: 10 INJECTION, SOLUTION EPIDURAL; INFILTRATION; INTRACAUDAL; PERINEURAL at 14:57

## 2023-12-04 RX ADMIN — SODIUM CHLORIDE 1000 ML: 9 INJECTION, SOLUTION INTRAVENOUS at 14:57

## 2023-12-04 RX ADMIN — ONDANSETRON 4 MG: 2 INJECTION INTRAMUSCULAR; INTRAVENOUS at 14:58

## 2023-12-04 NOTE — PROGRESS NOTES
Infusion Nursing Note:  Doretha Yu presents today for IVF.    Patient seen by provider today: No   present during visit today: Not Applicable.    Note: IV SL for infusion later infusions this week.      Intravenous Access:  Peripheral IV placed.    Treatment Conditions:  Not Applicable.      Post Infusion Assessment:  Patient tolerated infusion without incident.  Blood return noted pre and post infusion.  Site patent and intact, free from redness, edema or discomfort.  No evidence of extravasations.       Discharge Plan:   Discharge instructions reviewed with: Patient.  Patient and/or family verbalized understanding of discharge instructions and all questions answered.  Patient discharged in stable condition accompanied by: self.  Departure Mode: Ambulatory.      Chelsea Rodriguez RN

## 2023-12-04 NOTE — TELEPHONE ENCOUNTER
"Contacted patient and arranged follow up appointment with Beti Issa as discussed on 12/13.     Discussed current symptoms -     Patient reports output continues to be large volumes and very watery.     Is following the medication/drink recommendations.     States when the Metamucil is \"running through her,\" she does notice that output is briefly thicker.     Then returns to the same watery consistency.     Patient also reports that she has been eating smaller amounts throughout the day, which seems to be somewhat helpful as well.     Currently at an infusion appointment.     Routed to Beti for recommendations.   "

## 2023-12-04 NOTE — PROGRESS NOTES
Colon and Rectal Surgery Clinic Note    RE: Doretha Yu.  : 1975.  JUNIOR: 2023.    Reason for visit: pouchoscopy.    HPI:  Doretha Fernandez is a 46 year old female with ulcerative colitis. She presents today for a pouchoscopy. On 6/15/2021 I took her to the OR for a TAC with end ileostomy (laparoscopic). Her upper GI on 10/4/2022 showed mild chronic inflammation in the gastric antrum and LA grade A esophagitis with no bleeding in the gastroesophageal junction. Her MRE in 2022 showed a subtotal colectomy with right paramedian mid abdominal ileostomy and long Mejía's pouch. No findings specific for acute inflammation of the bowel. Multiple prominent pericolonic/perirectal lymph nodes, similar to comparison. Fat-containing noninflamed parastomal hernia. Pancreatic atrophy. She is s/p robotic completion proctectomy with creation of J-pouch with IPAA, takedown of end ileostomy, and diverting loop ileostomy 23.      Interval History: She is scheduled for ileostomy takedown on . She has been having mucous per pouch, but no longer any bleeding. She is currently taking lomotil 2 tablets QID, imodium 2 tablets QID, fiber TID, and IV fluids three times a week. She is following with GI for elevated liver enzymes.     CT Enterography (10/30/2023):  IMPRESSION:   1. Surgical changes of total colectomy and completion proctectomy with  right lower quadrant diverting ileostomy. No evidence of infectious or  inflammatory bowel pathology. No intra-abdominal abscess.  2. New left lower lobe consolidative opacity, compatible with  pneumonia.  3. Trace left pleural effusion with adjacent atelectasis.     Medications:  Current Outpatient Medications   Medication Sig Dispense Refill    acetaminophen (TYLENOL) 500 MG tablet Take 1,000 mg by mouth every 6 hours as needed for mild pain      albuterol (PROAIR HFA/PROVENTIL HFA/VENTOLIN HFA) 108 (90 Base) MCG/ACT inhaler Inhale 2 puffs into the lungs every 4 hours  as needed for shortness of breath / dyspnea 18 g 1    ARIPiprazole (ABILIFY) 2 MG tablet Take 1 tablet (2 mg) by mouth daily for 30 days 30 tablet 0    artificial saliva (BIOTENE MT) SOLN solution Swish and spit 2 mLs (2 sprays) in mouth 4 times daily 44.3 mL 0    benzocaine-menthol (CHLORASEPTIC MAX) 15-10 MG lozenge Place 1 lozenge inside cheek every hour as needed 15 lozenge 0    cholestyramine (QUESTRAN) 4 g packet Take 1 packet (4 g) by mouth 2 times daily (with meals) 60 packet 1    Cyanocobalamin (B-12 PO) Take 1 tablet by mouth daily      diphenoxylate-atropine (LOMOTIL) 2.5-0.025 MG tablet Take 1 tablet by mouth 2 times daily (before meals) for 30 days 60 tablet 0    famotidine (PEPCID) 20 MG tablet Take 1 tablet (20 mg) by mouth 2 times daily 60 tablet 0    fluconazole (DIFLUCAN) 150 MG tablet Take 1 tablet (150 mg) by mouth every 3 days for 3 doses 3 tablet 0    guaiFENesin-dextromethorphan (ROBITUSSIN DM) 100-10 MG/5ML syrup Take 10 mLs by mouth every 4 hours as needed for cough 236 mL 0    hydrOXYzine (ATARAX) 25 MG tablet Take 1 tablet (25 mg) by mouth 2 times daily 60 tablet 1    loperamide (IMODIUM) 2 MG capsule Take 2 capsules (4 mg) by mouth 4 times daily (before meals and nightly) for 30 days 240 capsule 0    magic mouthwash suspension, diphenhydrAMINE, lidocaine, aluminum-magnesium & simethicone, (FIRST-MOUTHWASH BLM) compounding kit Swish and swallow 10 mLs in mouth every 6 hours as needed for mouth sores 237 mL 0    medical cannabis (Patient's own supply) See Admin Instructions (The purpose of this order is to document that the patient reports taking medical cannabis.  This is not a prescription, and is not used to certify that the patient has a qualifying medical condition.)      medical cannabis (Patient's own supply) See Admin Instructions (The purpose of this order is to document that the patient reports taking medical cannabis.  This is not a prescription, and is not used to certify that  the patient has a qualifying medical condition.)      metFORMIN (GLUCOPHAGE) 500 MG tablet TAKE 2 TABLETS BY MOUTH 2 TIMES DAILY WITH MEALS (Patient taking differently: Take 500 mg by mouth 2 times daily (with meals) TAKE 2 TABLETS BY MOUTH 2 TIMES DAILY WITH MEALS) 360 tablet 1    methocarbamol (ROBAXIN) 750 MG tablet Take 1 tablet (750 mg) by mouth 4 times daily as needed for muscle spasms 40 tablet 0    ondansetron (ZOFRAN ODT) 4 MG ODT tab Take 1 tablet (4 mg) by mouth every 8 hours as needed for nausea or vomiting 10 tablet 1    Ostomy Supplies MISC 20 each daily 20 each 11    psyllium (METAMUCIL/KONSYL) Packet Take 1 packet by mouth 3 times daily for 30 days 90 packet 0    sucralfate (CARAFATE) 1 GM/10ML suspension  (Patient not taking: Reported on 11/25/2023)      terconazole (TERAZOL 7) 0.4 % vaginal cream Place 1 applicator vaginally at bedtime For 7 days please give the correct quantity and It says the tube is 45 grams 15 g 0    triamcinolone (KENALOG) 0.1 % paste Take by mouth 2 times daily 5 g 0    venlafaxine (EFFEXOR) 75 MG tablet TAKE TWO TABLETS BY MOUTH TWICE A  tablet 0       ROS:  A complete review of systems was performed with the patient and all systems negative except as per HPI.    Physical Examination:  Exam was chaperoned by Tiffanie Naidu CMA  And Jena Null NP   LMP  (LMP Unknown)   General: Well hydrated. No acute distress.  Abdomen: Soft, NT. No inguinal adenopathy palpated.  Perianal external examination:  Perianal skin: Intact with no excoriation or lichenification.  Lesions: No evidence of an external lesion, nodularity, or induration in the perianal region.  Eversion of buttocks: There was not evidence of an anal fissure. Details: N/A.  Skin tags or external hemorrhoids: None.  Digital rectal examination: Was performed.   Sphincter tone: Good.  Palpable lesions: No.  Other: None.  Bimanual examination: was not performed.    Anoscopy: Was  deferred    Procedures:  After discussing the risks and benefits, the patient agreed to proceed with flexible pouchoscopy.    Procedure:  The sigmoidoscope was inserted to the small bowel proximal to the J pouch and stopped due to no need to go further.    Findings: normal J pouch mucosa without polyps, tumors or diverticula, no biopsies taken; the ileorectal anastomosis bowel prep was adequate, procedure well tolerated without complications.  Retroflexion: Was not performed. This was not done.  The patient tolerated the procedure well.    Findings:  No results found. However, due to the size of the patient record, not all encounters were searched. Please check Results Review for a complete set of results.  .    ASSESSMENT  49 y/o lady with UC s/p robotic proctectomy, IPAA, and DLI.      Risks, benefits, and alternatives of operative treatment were thoroughly discussed with the patient, he/she understands these well and agrees to proceed.     PLAN  1. She is scheduled for a GGE today   2. OR for DLI takedown    30 minutes spent on the date of the encounter, 15 minutes in procedure and 15 minutes doing chart review, history and exam, imaging review, documentation and further activities as noted above.    Quinton Ram MD, PhD    Division of Colon and Rectal Surgery  [facility]    Referring Provider:  No referring provider defined for this encounter.     Primary Care Provider:  Anna Naidu

## 2023-12-06 ENCOUNTER — INFUSION THERAPY VISIT (OUTPATIENT)
Dept: INFUSION THERAPY | Facility: CLINIC | Age: 48
End: 2023-12-06
Attending: INTERNAL MEDICINE
Payer: COMMERCIAL

## 2023-12-06 VITALS
HEART RATE: 71 BPM | RESPIRATION RATE: 16 BRPM | DIASTOLIC BLOOD PRESSURE: 64 MMHG | SYSTOLIC BLOOD PRESSURE: 96 MMHG | OXYGEN SATURATION: 100 %

## 2023-12-06 DIAGNOSIS — R19.8 HIGH OUTPUT ILEOSTOMY (H): Primary | ICD-10-CM

## 2023-12-06 DIAGNOSIS — Z93.2 HIGH OUTPUT ILEOSTOMY (H): Primary | ICD-10-CM

## 2023-12-06 PROCEDURE — 258N000003 HC RX IP 258 OP 636

## 2023-12-06 PROCEDURE — 96374 THER/PROPH/DIAG INJ IV PUSH: CPT

## 2023-12-06 PROCEDURE — 250N000011 HC RX IP 250 OP 636: Mod: JZ | Performed by: SURGERY

## 2023-12-06 PROCEDURE — 96361 HYDRATE IV INFUSION ADD-ON: CPT

## 2023-12-06 RX ORDER — MEPERIDINE HYDROCHLORIDE 25 MG/ML
25 INJECTION INTRAMUSCULAR; INTRAVENOUS; SUBCUTANEOUS EVERY 30 MIN PRN
Status: CANCELLED | OUTPATIENT
Start: 2023-12-11

## 2023-12-06 RX ORDER — ALBUTEROL SULFATE 90 UG/1
1-2 AEROSOL, METERED RESPIRATORY (INHALATION)
Status: CANCELLED
Start: 2023-12-11

## 2023-12-06 RX ORDER — DIPHENHYDRAMINE HYDROCHLORIDE 50 MG/ML
50 INJECTION INTRAMUSCULAR; INTRAVENOUS
Status: CANCELLED
Start: 2023-12-11

## 2023-12-06 RX ORDER — LIDOCAINE HYDROCHLORIDE 10 MG/ML
1 INJECTION, SOLUTION EPIDURAL; INFILTRATION; INTRACAUDAL; PERINEURAL ONCE
Status: DISCONTINUED | OUTPATIENT
Start: 2023-12-06 | End: 2023-12-06 | Stop reason: HOSPADM

## 2023-12-06 RX ORDER — ALBUTEROL SULFATE 0.83 MG/ML
2.5 SOLUTION RESPIRATORY (INHALATION)
Status: CANCELLED | OUTPATIENT
Start: 2023-12-11

## 2023-12-06 RX ORDER — HEPARIN SODIUM (PORCINE) LOCK FLUSH IV SOLN 100 UNIT/ML 100 UNIT/ML
5 SOLUTION INTRAVENOUS
Status: CANCELLED | OUTPATIENT
Start: 2023-12-11

## 2023-12-06 RX ORDER — ONDANSETRON 2 MG/ML
4 INJECTION INTRAMUSCULAR; INTRAVENOUS EVERY 6 HOURS PRN
Status: CANCELLED
Start: 2023-12-11

## 2023-12-06 RX ORDER — ONDANSETRON 2 MG/ML
4 INJECTION INTRAMUSCULAR; INTRAVENOUS EVERY 6 HOURS PRN
Status: DISCONTINUED | OUTPATIENT
Start: 2023-12-06 | End: 2023-12-06 | Stop reason: HOSPADM

## 2023-12-06 RX ORDER — HEPARIN SODIUM,PORCINE 10 UNIT/ML
5-20 VIAL (ML) INTRAVENOUS DAILY PRN
Status: CANCELLED | OUTPATIENT
Start: 2023-12-11

## 2023-12-06 RX ORDER — METHYLPREDNISOLONE SODIUM SUCCINATE 125 MG/2ML
125 INJECTION, POWDER, LYOPHILIZED, FOR SOLUTION INTRAMUSCULAR; INTRAVENOUS
Status: CANCELLED
Start: 2023-12-11

## 2023-12-06 RX ORDER — EPINEPHRINE 1 MG/ML
0.3 INJECTION, SOLUTION, CONCENTRATE INTRAVENOUS EVERY 5 MIN PRN
Status: CANCELLED | OUTPATIENT
Start: 2023-12-11

## 2023-12-06 RX ADMIN — SODIUM CHLORIDE 1000 ML: 9 INJECTION, SOLUTION INTRAVENOUS at 14:36

## 2023-12-06 RX ADMIN — ONDANSETRON 4 MG: 2 INJECTION INTRAMUSCULAR; INTRAVENOUS at 14:38

## 2023-12-06 NOTE — TELEPHONE ENCOUNTER
"Beti Issa PA-C Broich, Samantha, RN  Phone Number: [phone]     \"Adriel Carvalho! Thank you!!    Can we get her set up for repeat stool tests (enteric panel, C.Diff, O&P, giardia/crypto, microsporidia, fecal calprotectin) and EGD + ileoscopy as soon as possible. Not sure if Dr. Davey has any opening in the next week or two.    Thanks!  Beti\"    All orders placed. Attempted to contact the patient to provide an update, no answer. Left detailed message with direct callback number. Also sent [employer] message. Inbasket message sent to endoscopy scheduling requesting assistance with finding a slot.   "

## 2023-12-06 NOTE — PROGRESS NOTES
Infusion Nursing Note:  Doretha Yu presents today for IVF.    Patient seen by provider today: No   present during visit today: Not Applicable.    Note: IV R hand was accidentally pulled out at home. Attempted IV starts X2. Requesting U/S assisted placement from ED. 22G R wrist placed by Gabi TORREZ RN via U/S. SL IV after infusion for return appointment Friday 12/8/23.       Intravenous Access:  Peripheral IV placed.    Treatment Conditions:  Not Applicable.      Post Infusion Assessment:  Patient tolerated infusion without incident.  Site patent and intact, free from redness, edema or discomfort.  No evidence of extravasations.       Discharge Plan:   Discharge instructions reviewed with: Patient.  Patient and/or family verbalized understanding of discharge instructions and all questions answered.  Patient discharged in stable condition accompanied by: self.  Departure Mode: Ambulatory.      Chelsea Rodriguez RN

## 2023-12-08 ENCOUNTER — INFUSION THERAPY VISIT (OUTPATIENT)
Dept: INFUSION THERAPY | Facility: CLINIC | Age: 48
End: 2023-12-08
Attending: INTERNAL MEDICINE
Payer: COMMERCIAL

## 2023-12-08 VITALS
SYSTOLIC BLOOD PRESSURE: 115 MMHG | RESPIRATION RATE: 14 BRPM | TEMPERATURE: 97.8 F | HEART RATE: 62 BPM | DIASTOLIC BLOOD PRESSURE: 81 MMHG

## 2023-12-08 DIAGNOSIS — R19.8 HIGH OUTPUT ILEOSTOMY (H): Primary | ICD-10-CM

## 2023-12-08 DIAGNOSIS — Z93.2 HIGH OUTPUT ILEOSTOMY (H): Primary | ICD-10-CM

## 2023-12-08 PROCEDURE — 250N000011 HC RX IP 250 OP 636: Mod: JZ | Performed by: SURGERY

## 2023-12-08 PROCEDURE — 96361 HYDRATE IV INFUSION ADD-ON: CPT

## 2023-12-08 PROCEDURE — 96374 THER/PROPH/DIAG INJ IV PUSH: CPT

## 2023-12-08 PROCEDURE — 258N000003 HC RX IP 258 OP 636

## 2023-12-08 RX ORDER — MEPERIDINE HYDROCHLORIDE 25 MG/ML
25 INJECTION INTRAMUSCULAR; INTRAVENOUS; SUBCUTANEOUS EVERY 30 MIN PRN
Status: CANCELLED | OUTPATIENT
Start: 2023-12-11

## 2023-12-08 RX ORDER — DIPHENHYDRAMINE HYDROCHLORIDE 50 MG/ML
50 INJECTION INTRAMUSCULAR; INTRAVENOUS
Status: CANCELLED
Start: 2023-12-11

## 2023-12-08 RX ORDER — HEPARIN SODIUM (PORCINE) LOCK FLUSH IV SOLN 100 UNIT/ML 100 UNIT/ML
5 SOLUTION INTRAVENOUS
Status: CANCELLED | OUTPATIENT
Start: 2023-12-11

## 2023-12-08 RX ORDER — HEPARIN SODIUM,PORCINE 10 UNIT/ML
5-20 VIAL (ML) INTRAVENOUS DAILY PRN
Status: CANCELLED | OUTPATIENT
Start: 2023-12-11

## 2023-12-08 RX ORDER — ONDANSETRON 2 MG/ML
4 INJECTION INTRAMUSCULAR; INTRAVENOUS EVERY 6 HOURS PRN
Status: DISCONTINUED | OUTPATIENT
Start: 2023-12-08 | End: 2023-12-08 | Stop reason: HOSPADM

## 2023-12-08 RX ORDER — ALBUTEROL SULFATE 0.83 MG/ML
2.5 SOLUTION RESPIRATORY (INHALATION)
Status: CANCELLED | OUTPATIENT
Start: 2023-12-11

## 2023-12-08 RX ORDER — ALBUTEROL SULFATE 90 UG/1
1-2 AEROSOL, METERED RESPIRATORY (INHALATION)
Status: CANCELLED
Start: 2023-12-11

## 2023-12-08 RX ORDER — EPINEPHRINE 1 MG/ML
0.3 INJECTION, SOLUTION, CONCENTRATE INTRAVENOUS EVERY 5 MIN PRN
Status: CANCELLED | OUTPATIENT
Start: 2023-12-11

## 2023-12-08 RX ORDER — ONDANSETRON 2 MG/ML
4 INJECTION INTRAMUSCULAR; INTRAVENOUS EVERY 6 HOURS PRN
Status: CANCELLED
Start: 2023-12-11

## 2023-12-08 RX ORDER — METHYLPREDNISOLONE SODIUM SUCCINATE 125 MG/2ML
125 INJECTION, POWDER, LYOPHILIZED, FOR SOLUTION INTRAMUSCULAR; INTRAVENOUS
Status: CANCELLED
Start: 2023-12-11

## 2023-12-08 RX ADMIN — SODIUM CHLORIDE 1000 ML: 9 INJECTION, SOLUTION INTRAVENOUS at 12:53

## 2023-12-08 RX ADMIN — ONDANSETRON 4 MG: 2 INJECTION INTRAMUSCULAR; INTRAVENOUS at 12:54

## 2023-12-08 ASSESSMENT — PAIN SCALES - GENERAL: PAINLEVEL: SEVERE PAIN (6)

## 2023-12-08 NOTE — PROGRESS NOTES
Infusion Nursing Note:  Doretha Yu presents today for IVFs & Zofran.    Patient seen by provider today: No   present during visit today: Not Applicable.    Note: N/A.      Intravenous Access:  Peripheral IV patent.    Treatment Conditions:  Not Applicable.      Post Infusion Assessment:  Patient tolerated infusion without incident.  Blood return noted pre and post infusion.  Site patent and intact, free from redness, edema or discomfort.  No evidence of extravasations.  Access discontinued per protocol.       Discharge Plan:   Patient discharged in stable condition accompanied by: self.  Departure Mode: Ambulatory.      Alice Argueta RN

## 2023-12-11 ENCOUNTER — HOSPITAL ENCOUNTER (OUTPATIENT)
Dept: MRI IMAGING | Facility: CLINIC | Age: 48
Discharge: HOME | End: 2023-12-11
Attending: DIETITIAN, REGISTERED | Admitting: DIETITIAN, REGISTERED
Payer: COMMERCIAL

## 2023-12-11 ENCOUNTER — LAB (OUTPATIENT)
Dept: INFUSION THERAPY | Facility: CLINIC | Age: 48
End: 2023-12-11
Attending: PHYSICIAN ASSISTANT
Payer: COMMERCIAL

## 2023-12-11 VITALS — OXYGEN SATURATION: 97 % | HEART RATE: 90 BPM | RESPIRATION RATE: 16 BRPM

## 2023-12-11 DIAGNOSIS — R19.8 HIGH OUTPUT ILEOSTOMY (H): Primary | ICD-10-CM

## 2023-12-11 DIAGNOSIS — R19.8 HIGH OUTPUT ILEOSTOMY (H): ICD-10-CM

## 2023-12-11 DIAGNOSIS — Z93.2 HIGH OUTPUT ILEOSTOMY (H): ICD-10-CM

## 2023-12-11 DIAGNOSIS — D64.9 NORMOCYTIC ANEMIA: ICD-10-CM

## 2023-12-11 DIAGNOSIS — K51.90 ULCERATIVE COLITIS WITHOUT COMPLICATIONS, UNSPECIFIED LOCATION (H): ICD-10-CM

## 2023-12-11 DIAGNOSIS — R79.89 ELEVATED LFTS: ICD-10-CM

## 2023-12-11 DIAGNOSIS — Z93.2 HIGH OUTPUT ILEOSTOMY (H): Primary | ICD-10-CM

## 2023-12-11 DIAGNOSIS — R19.7 DIARRHEA: ICD-10-CM

## 2023-12-11 LAB
ANION GAP SERPL CALCULATED.3IONS-SCNC: 15 MMOL/L (ref 7–15)
BUN SERPL-MCNC: 18.2 MG/DL (ref 6–20)
CALCIUM SERPL-MCNC: 9.8 MG/DL (ref 8.6–10)
CHLORIDE SERPL-SCNC: 100 MMOL/L (ref 98–107)
CREAT SERPL-MCNC: 0.93 MG/DL (ref 0.51–0.95)
DEPRECATED HCO3 PLAS-SCNC: 22 MMOL/L (ref 22–29)
EGFRCR SERPLBLD CKD-EPI 2021: 76 ML/MIN/1.73M2
GLUCOSE SERPL-MCNC: 99 MG/DL (ref 70–99)
HGB BLD-MCNC: 11.8 G/DL (ref 11.7–15.7)
MAGNESIUM SERPL-MCNC: 1.8 MG/DL (ref 1.7–2.3)
POTASSIUM SERPL-SCNC: 4.2 MMOL/L (ref 3.4–5.3)
SODIUM SERPL-SCNC: 137 MMOL/L (ref 135–145)

## 2023-12-11 PROCEDURE — 96374 THER/PROPH/DIAG INJ IV PUSH: CPT

## 2023-12-11 PROCEDURE — 36415 COLL VENOUS BLD VENIPUNCTURE: CPT

## 2023-12-11 PROCEDURE — 250N000011 HC RX IP 250 OP 636: Mod: JZ | Performed by: SURGERY

## 2023-12-11 PROCEDURE — 255N000002 HC RX 255 OP 636: Performed by: DIETITIAN, REGISTERED

## 2023-12-11 PROCEDURE — 258N000003 HC RX IP 258 OP 636: Performed by: DIETITIAN, REGISTERED

## 2023-12-11 PROCEDURE — 96361 HYDRATE IV INFUSION ADD-ON: CPT

## 2023-12-11 PROCEDURE — 85018 HEMOGLOBIN: CPT | Performed by: FAMILY MEDICINE

## 2023-12-11 PROCEDURE — 258N000003 HC RX IP 258 OP 636

## 2023-12-11 PROCEDURE — 83735 ASSAY OF MAGNESIUM: CPT | Performed by: PHYSICIAN ASSISTANT

## 2023-12-11 PROCEDURE — 82310 ASSAY OF CALCIUM: CPT | Performed by: PHYSICIAN ASSISTANT

## 2023-12-11 PROCEDURE — A9585 GADOBUTROL INJECTION: HCPCS | Performed by: DIETITIAN, REGISTERED

## 2023-12-11 PROCEDURE — 74183 MRI ABD W/O CNTR FLWD CNTR: CPT

## 2023-12-11 RX ORDER — METHYLPREDNISOLONE SODIUM SUCCINATE 125 MG/2ML
125 INJECTION, POWDER, LYOPHILIZED, FOR SOLUTION INTRAMUSCULAR; INTRAVENOUS
Status: CANCELLED
Start: 2023-12-18

## 2023-12-11 RX ORDER — ALBUTEROL SULFATE 90 UG/1
1-2 AEROSOL, METERED RESPIRATORY (INHALATION)
Status: CANCELLED
Start: 2023-12-18

## 2023-12-11 RX ORDER — ONDANSETRON 2 MG/ML
4 INJECTION INTRAMUSCULAR; INTRAVENOUS EVERY 6 HOURS PRN
Status: CANCELLED
Start: 2023-12-18

## 2023-12-11 RX ORDER — HEPARIN SODIUM (PORCINE) LOCK FLUSH IV SOLN 100 UNIT/ML 100 UNIT/ML
5 SOLUTION INTRAVENOUS
Status: CANCELLED | OUTPATIENT
Start: 2023-12-18

## 2023-12-11 RX ORDER — ONDANSETRON 2 MG/ML
4 INJECTION INTRAMUSCULAR; INTRAVENOUS EVERY 6 HOURS PRN
Status: DISCONTINUED | OUTPATIENT
Start: 2023-12-11 | End: 2023-12-11 | Stop reason: HOSPADM

## 2023-12-11 RX ORDER — ALBUTEROL SULFATE 0.83 MG/ML
2.5 SOLUTION RESPIRATORY (INHALATION)
Status: CANCELLED | OUTPATIENT
Start: 2023-12-18

## 2023-12-11 RX ORDER — HEPARIN SODIUM,PORCINE 10 UNIT/ML
5-20 VIAL (ML) INTRAVENOUS DAILY PRN
Status: CANCELLED | OUTPATIENT
Start: 2023-12-18

## 2023-12-11 RX ORDER — MEPERIDINE HYDROCHLORIDE 25 MG/ML
25 INJECTION INTRAMUSCULAR; INTRAVENOUS; SUBCUTANEOUS EVERY 30 MIN PRN
Status: CANCELLED | OUTPATIENT
Start: 2023-12-18

## 2023-12-11 RX ORDER — DIPHENHYDRAMINE HYDROCHLORIDE 50 MG/ML
50 INJECTION INTRAMUSCULAR; INTRAVENOUS
Status: CANCELLED
Start: 2023-12-18

## 2023-12-11 RX ORDER — GADOBUTROL 604.72 MG/ML
8 INJECTION INTRAVENOUS ONCE
Status: COMPLETED | OUTPATIENT
Start: 2023-12-11 | End: 2023-12-11

## 2023-12-11 RX ORDER — EPINEPHRINE 1 MG/ML
0.3 INJECTION, SOLUTION, CONCENTRATE INTRAVENOUS EVERY 5 MIN PRN
Status: CANCELLED | OUTPATIENT
Start: 2023-12-18

## 2023-12-11 RX ADMIN — ONDANSETRON 4 MG: 2 INJECTION INTRAMUSCULAR; INTRAVENOUS at 08:15

## 2023-12-11 RX ADMIN — SODIUM CHLORIDE 1000 ML: 9 INJECTION, SOLUTION INTRAVENOUS at 08:15

## 2023-12-11 RX ADMIN — GADOBUTROL 8 ML: 604.72 INJECTION INTRAVENOUS at 10:35

## 2023-12-11 RX ADMIN — SODIUM CHLORIDE 50 ML: 9 INJECTION, SOLUTION INTRAVENOUS at 10:39

## 2023-12-11 ASSESSMENT — PAIN SCALES - GENERAL: PAINLEVEL: SEVERE PAIN (6)

## 2023-12-11 NOTE — PROGRESS NOTES
Infusion Nursing Note:  Doretha Yu presents today for IVFs/ Zofran.    Patient seen by provider today: No   present during visit today: Not Applicable.    Note: N/A.      Intravenous Access:  Peripheral IV placed.    Treatment Conditions:  Not Applicable.      Post Infusion Assessment:  Patient tolerated infusion without incident.  Blood return noted pre and post infusion.  Site patent and intact, free from redness, edema or discomfort.  No evidence of extravasations.   PIV secured for MRI.      Discharge Plan:   Patient discharged in stable condition accompanied by: self.  Departure Mode: Ambulatory to Imaging.      Alice Argueta RN

## 2023-12-12 ENCOUNTER — OFFICE VISIT (OUTPATIENT)
Dept: SURGERY | Facility: CLINIC | Age: 48
End: 2023-12-12
Payer: COMMERCIAL

## 2023-12-12 ENCOUNTER — ANCILLARY PROCEDURE (OUTPATIENT)
Dept: GENERAL RADIOLOGY | Facility: CLINIC | Age: 48
End: 2023-12-12
Attending: SURGERY
Payer: COMMERCIAL

## 2023-12-12 ENCOUNTER — LAB (OUTPATIENT)
Dept: LAB | Facility: CLINIC | Age: 48
End: 2023-12-12
Payer: COMMERCIAL

## 2023-12-12 VITALS — HEART RATE: 120 BPM | SYSTOLIC BLOOD PRESSURE: 85 MMHG | DIASTOLIC BLOOD PRESSURE: 68 MMHG | OXYGEN SATURATION: 100 %

## 2023-12-12 DIAGNOSIS — K51.919 ULCERATIVE COLITIS WITH COMPLICATION, UNSPECIFIED LOCATION (H): Primary | ICD-10-CM

## 2023-12-12 DIAGNOSIS — Z09 FOLLOW-UP EXAMINATION AFTER COLORECTAL SURGERY: ICD-10-CM

## 2023-12-12 DIAGNOSIS — K51.90 ULCERATIVE COLITIS WITHOUT COMPLICATIONS, UNSPECIFIED LOCATION (H): Primary | ICD-10-CM

## 2023-12-12 DIAGNOSIS — R19.8 HIGH OUTPUT ILEOSTOMY (H): ICD-10-CM

## 2023-12-12 DIAGNOSIS — R19.7 DIARRHEA: ICD-10-CM

## 2023-12-12 DIAGNOSIS — Z93.2 HIGH OUTPUT ILEOSTOMY (H): ICD-10-CM

## 2023-12-12 DIAGNOSIS — K51.90 ULCERATIVE COLITIS WITHOUT COMPLICATIONS, UNSPECIFIED LOCATION (H): ICD-10-CM

## 2023-12-12 LAB

## 2023-12-12 PROCEDURE — 87015 SPECIMEN INFECT AGNT CONCNTJ: CPT | Mod: 90

## 2023-12-12 PROCEDURE — 99000 SPECIMEN HANDLING OFFICE-LAB: CPT

## 2023-12-12 PROCEDURE — 44385 ENDOSCOPY OF BOWEL POUCH: CPT | Performed by: SURGERY

## 2023-12-12 PROCEDURE — 99214 OFFICE O/P EST MOD 30 MIN: CPT | Mod: 25 | Performed by: SURGERY

## 2023-12-12 PROCEDURE — 87207 SMEAR SPECIAL STAIN: CPT | Mod: 90

## 2023-12-12 PROCEDURE — 74270 X-RAY XM COLON 1CNTRST STD: CPT | Mod: GC | Performed by: STUDENT IN AN ORGANIZED HEALTH CARE EDUCATION/TRAINING PROGRAM

## 2023-12-12 PROCEDURE — 83993 ASSAY FOR CALPROTECTIN FECAL: CPT

## 2023-12-12 PROCEDURE — 87507 IADNA-DNA/RNA PROBE TQ 12-25: CPT

## 2023-12-12 PROCEDURE — 87209 SMEAR COMPLEX STAIN: CPT

## 2023-12-12 PROCEDURE — 87493 C DIFF AMPLIFIED PROBE: CPT | Mod: 59

## 2023-12-12 PROCEDURE — 87177 OVA AND PARASITES SMEARS: CPT | Mod: 59

## 2023-12-12 RX ADMIN — DIATRIZOATE MEGLUMINE AND DIATRIZOATE SODIUM 360 ML: 660; 100 SOLUTION ORAL; RECTAL at 14:10

## 2023-12-12 ASSESSMENT — PAIN SCALES - GENERAL: PAINLEVEL: SEVERE PAIN (6)

## 2023-12-12 NOTE — PATIENT INSTRUCTIONS
Follow up:    Please call with any questions or concerns regarding your clinic visit today.    It is a pleasure being involved in your health care.    Contacts post-consultation depending on your need:    Radiology Appointments [phone]    Schedule Clinic Appointments [phone] # 1   M-F 7:30 - 5 pm    Clinic Fax Number [phone]    Surgery Scheduling [phone]    My Chart is available 24 hours a day and is a secure way to access your records and communicate with your care team.  I strongly recommend signing up if you haven't already done so, if you are comfortable with computers.  If you would like to inquire about this or are having problems with My Chart access, you may call [phone] or go online at [email or web address].  Please allow at least 24 hours for a response and extra time on weekends and Holidays.

## 2023-12-12 NOTE — CONFIDENTIAL NOTE
Doretha EARLE Yu had flexible endoscope (serial number [id number], model Olympus 0160S) used for a procedure on 12/12/2023 at 11:58 AM.       Tiffanie Naidu CMA

## 2023-12-12 NOTE — PROGRESS NOTES
Received call from [facility] lab reporting the stool study orders were no longer available. Reordered, updated lab.

## 2023-12-12 NOTE — LETTER
2023       RE: Doretha Yu  [id number]  [facility] [address]       Dear Colleague,    Thank you for referring your patient, Doretha Yu, to the [facility] [place] at [facility]. Please see a copy of my visit note below.    Colon and Rectal Surgery Clinic Note    RE: Doretha Yu.  : 1975.  JUNIOR: 2023.    Reason for visit: pouchoscopy.    HPI:  Doretha Fernandez is a 46 year old female with ulcerative colitis. She presents today for a pouchoscopy. On 6/15/2021 I took her to the OR for a TAC with end ileostomy (laparoscopic). Her upper GI on 10/4/2022 showed mild chronic inflammation in the gastric antrum and LA grade A esophagitis with no bleeding in the gastroesophageal junction. Her MRE in 2022 showed a subtotal colectomy with right paramedian mid abdominal ileostomy and long Mejía's pouch. No findings specific for acute inflammation of the bowel. Multiple prominent pericolonic/perirectal lymph nodes, similar to comparison. Fat-containing noninflamed parastomal hernia. Pancreatic atrophy. She is s/p robotic completion proctectomy with creation of J-pouch with IPAA, takedown of end ileostomy, and diverting loop ileostomy 23.      Interval History: She is scheduled for ileostomy takedown on . She has been having mucous per pouch, but no longer any bleeding. She is currently taking lomotil 2 tablets QID, imodium 2 tablets QID, fiber TID, and IV fluids three times a week. She is following with GI for elevated liver enzymes.     CT Enterography (10/30/2023):  IMPRESSION:   1. Surgical changes of total colectomy and completion proctectomy with  right lower quadrant diverting ileostomy. No evidence of infectious or  inflammatory bowel pathology. No intra-abdominal abscess.  2. New left lower lobe consolidative opacity, compatible with  pneumonia.  3. Trace left pleural effusion with  adjacent atelectasis.     Medications:  Current Outpatient Medications   Medication Sig Dispense Refill    acetaminophen (TYLENOL) 500 MG tablet Take 1,000 mg by mouth every 6 hours as needed for mild pain      albuterol (PROAIR HFA/PROVENTIL HFA/VENTOLIN HFA) 108 (90 Base) MCG/ACT inhaler Inhale 2 puffs into the lungs every 4 hours as needed for shortness of breath / dyspnea 18 g 1    ARIPiprazole (ABILIFY) 2 MG tablet Take 1 tablet (2 mg) by mouth daily for 30 days 30 tablet 0    artificial saliva (BIOTENE MT) SOLN solution Swish and spit 2 mLs (2 sprays) in mouth 4 times daily 44.3 mL 0    benzocaine-menthol (CHLORASEPTIC MAX) 15-10 MG lozenge Place 1 lozenge inside cheek every hour as needed 15 lozenge 0    cholestyramine (QUESTRAN) 4 g packet Take 1 packet (4 g) by mouth 2 times daily (with meals) 60 packet 1    Cyanocobalamin (B-12 PO) Take 1 tablet by mouth daily      diphenoxylate-atropine (LOMOTIL) 2.5-0.025 MG tablet Take 1 tablet by mouth 2 times daily (before meals) for 30 days 60 tablet 0    famotidine (PEPCID) 20 MG tablet Take 1 tablet (20 mg) by mouth 2 times daily 60 tablet 0    fluconazole (DIFLUCAN) 150 MG tablet Take 1 tablet (150 mg) by mouth every 3 days for 3 doses 3 tablet 0    guaiFENesin-dextromethorphan (ROBITUSSIN DM) 100-10 MG/5ML syrup Take 10 mLs by mouth every 4 hours as needed for cough 236 mL 0    hydrOXYzine (ATARAX) 25 MG tablet Take 1 tablet (25 mg) by mouth 2 times daily 60 tablet 1    loperamide (IMODIUM) 2 MG capsule Take 2 capsules (4 mg) by mouth 4 times daily (before meals and nightly) for 30 days 240 capsule 0    magic mouthwash suspension, diphenhydrAMINE, lidocaine, aluminum-magnesium & simethicone, (FIRST-MOUTHWASH BLM) compounding kit Swish and swallow 10 mLs in mouth every 6 hours as needed for mouth sores 237 mL 0    medical cannabis (Patient's own supply) See Admin Instructions (The purpose of this order is to document that the patient reports taking medical  cannabis.  This is not a prescription, and is not used to certify that the patient has a qualifying medical condition.)      medical cannabis (Patient's own supply) See Admin Instructions (The purpose of this order is to document that the patient reports taking medical cannabis.  This is not a prescription, and is not used to certify that the patient has a qualifying medical condition.)      metFORMIN (GLUCOPHAGE) 500 MG tablet TAKE 2 TABLETS BY MOUTH 2 TIMES DAILY WITH MEALS (Patient taking differently: Take 500 mg by mouth 2 times daily (with meals) TAKE 2 TABLETS BY MOUTH 2 TIMES DAILY WITH MEALS) 360 tablet 1    methocarbamol (ROBAXIN) 750 MG tablet Take 1 tablet (750 mg) by mouth 4 times daily as needed for muscle spasms 40 tablet 0    ondansetron (ZOFRAN ODT) 4 MG ODT tab Take 1 tablet (4 mg) by mouth every 8 hours as needed for nausea or vomiting 10 tablet 1    Ostomy Supplies MISC 20 each daily 20 each 11    psyllium (METAMUCIL/KONSYL) Packet Take 1 packet by mouth 3 times daily for 30 days 90 packet 0    sucralfate (CARAFATE) 1 GM/10ML suspension  (Patient not taking: Reported on 11/25/2023)      terconazole (TERAZOL 7) 0.4 % vaginal cream Place 1 applicator vaginally at bedtime For 7 days please give the correct quantity and It says the tube is 45 grams 15 g 0    triamcinolone (KENALOG) 0.1 % paste Take by mouth 2 times daily 5 g 0    venlafaxine (EFFEXOR) 75 MG tablet TAKE TWO TABLETS BY MOUTH TWICE A  tablet 0       ROS:  A complete review of systems was performed with the patient and all systems negative except as per HPI.    Physical Examination:  Exam was chaperoned by Tiffanie Naidu CMA  And Jena Null NP   LMP  (LMP Unknown)   General: Well hydrated. No acute distress.  Abdomen: Soft, NT. No inguinal adenopathy palpated.  Perianal external examination:  Perianal skin: Intact with no excoriation or lichenification.  Lesions: No evidence of an external lesion, nodularity, or  induration in the perianal region.  Eversion of buttocks: There was not evidence of an anal fissure. Details: N/A.  Skin tags or external hemorrhoids: None.  Digital rectal examination: Was performed.   Sphincter tone: Good.  Palpable lesions: No.  Other: None.  Bimanual examination: was not performed.    Anoscopy: Was deferred    Procedures:  After discussing the risks and benefits, the patient agreed to proceed with flexible pouchoscopy.    Procedure:  The sigmoidoscope was inserted to the small bowel proximal to the J pouch and stopped due to no need to go further.    Findings: normal J pouch mucosa without polyps, tumors or diverticula, no biopsies taken; the ileorectal anastomosis bowel prep was adequate, procedure well tolerated without complications.  Retroflexion: Was not performed. This was not done.  The patient tolerated the procedure well.    Findings:  No results found. However, due to the size of the patient record, not all encounters were searched. Please check Results Review for a complete set of results.  .    ASSESSMENT  49 y/o lady with UC s/p robotic proctectomy, IPAA, and DLI.      Risks, benefits, and alternatives of operative treatment were thoroughly discussed with the patient, he/she understands these well and agrees to proceed.     PLAN  1. She is scheduled for a GGE today   2. OR for DLI takedown    30 minutes spent on the date of the encounter, 15 minutes in procedure and 15 minutes doing chart review, history and exam, imaging review, documentation and further activities as noted above.      Referring Provider:  No referring provider defined for this encounter.     Primary Care Provider:  Anna Naidu      Again, thank you for allowing me to participate in the care of your patient.      Sincerely,    Quinton Ram MD

## 2023-12-13 ENCOUNTER — VIRTUAL VISIT (OUTPATIENT)
Dept: GASTROENTEROLOGY | Facility: CLINIC | Age: 48
End: 2023-12-13
Payer: COMMERCIAL

## 2023-12-13 ENCOUNTER — TELEPHONE (OUTPATIENT)
Dept: GASTROENTEROLOGY | Facility: CLINIC | Age: 48
End: 2023-12-13

## 2023-12-13 ENCOUNTER — VIRTUAL VISIT (OUTPATIENT)
Dept: HEMATOLOGY | Facility: CLINIC | Age: 48
End: 2023-12-13
Attending: STUDENT IN AN ORGANIZED HEALTH CARE EDUCATION/TRAINING PROGRAM
Payer: COMMERCIAL

## 2023-12-13 VITALS — HEIGHT: 62 IN | BODY MASS INDEX: 32.76 KG/M2 | WEIGHT: 178 LBS

## 2023-12-13 DIAGNOSIS — I82.409 RECURRENT DEEP VEIN THROMBOSIS (H): ICD-10-CM

## 2023-12-13 DIAGNOSIS — K94.19 ALTERED BOWEL ELIMINATION DUE TO INTESTINAL OSTOMY (H): ICD-10-CM

## 2023-12-13 DIAGNOSIS — Z93.2 HIGH OUTPUT ILEOSTOMY (H): ICD-10-CM

## 2023-12-13 DIAGNOSIS — R19.8 HIGH OUTPUT ILEOSTOMY (H): ICD-10-CM

## 2023-12-13 DIAGNOSIS — D68.52 PROTHROMBIN MUTATION (H): ICD-10-CM

## 2023-12-13 DIAGNOSIS — K51.90 ULCERATIVE COLITIS WITHOUT COMPLICATIONS, UNSPECIFIED LOCATION (H): ICD-10-CM

## 2023-12-13 DIAGNOSIS — R79.89 ELEVATED LFTS: Primary | ICD-10-CM

## 2023-12-13 LAB
CALPROTECTIN STL-MCNT: 25.5 MG/KG (ref 0–49.9)
O+P STL MICRO: NEGATIVE

## 2023-12-13 PROCEDURE — 99204 OFFICE O/P NEW MOD 45 MIN: CPT | Mod: VID | Performed by: INTERNAL MEDICINE

## 2023-12-13 PROCEDURE — 99215 OFFICE O/P EST HI 40 MIN: CPT | Mod: VID | Performed by: DIETITIAN, REGISTERED

## 2023-12-13 ASSESSMENT — PAIN SCALES - GENERAL: PAINLEVEL: MODERATE PAIN (4)

## 2023-12-13 NOTE — NURSING NOTE
Is the patient currently in the state of [place]? YES    Visit mode:VIDEO    If the visit is dropped, the patient can be reconnected by: VIDEO VISIT: Text to cell phone:   Telephone Information:   Mobile [phone]       Will anyone else be joining the visit? NO  (If patient encounters technical issues they should call [phone]    How would you like to obtain your AVS? MyChart    Are changes needed to the allergy or medication list? No    Reason for visit: Video Visit (Recheck IBD)    Rufina MANE

## 2023-12-13 NOTE — PROGRESS NOTES
Doretha Yu is a 48 year old female who is being evaluated via a billable video visit.    Virtual Visit Details    Type of service:  Video Visit   Video Start Time: 10:03 AM  Video End Time:10:30 AM    Originating Location (pt. Location): Home    Distant Location (provider location):  Off-site  Platform used for Video Visit: [facility]          IBD CLINIC VISIT     CC/REFERRING MD:  No ref. provider found  REASON FOR CONSULTATION: high ilestomy output, elevated LFTs    ASSESSMENT/PLAN    48 year old female with history prothrombin deficiency, CVA in 2004 after giving birth, asthma,  chronic pain, depression, anxiety,  melanoma and nivolumab induced UC now s/p total abdominal colectomy and end ileostomy on 6/15/2021 with Dr. Ram and robotic completion proctectomy with creation of J-pouch with IPAA, takedown of end ileostomy, and diverting loop ileostomy 9/5/23. Following up in clinic due to high ileostomy output and elevated LFTs.    1. Ulcerative colitis:   2. High output ostomy  UC proctosigmoiditis now s/p  total abdominal colectomy and end ileostomy on 6/15/2021, completion proctectomy with creation of J-pouch with IPAA, takedown of end ileostomy, and diverting loop ileostomy 9/5/23. Surgical path consistent with moderate disease. She continues to follow closely with colorectal surgery at this time.  Since second surgery has had increased outputs, most recently was slight decrease to 1985-1058 ML per day.  Work-up with infectious stool studies including enteric panel and C. difficile have been negative, inflammatory markers have been elevated however fecal calprotectin normal and CT enterography done which did not show any evidence of bowel inflammation.  It is not fully clear what is driving her high output.  We have rechecked infectious stool studies with ova and parasite, Cryptosporidium, microsporidia and, and C. difficile results pending.  Fiber supplement has thickened output some, we will continue  this, only using twice daily currently, will increase to 3 times daily.  Will also continue max dose loperamide at 2 tabs 4 times per day.  Patient currently using Lomotil 1 tab twice daily, has previously been recommended up to 8 tabs per day, will start with 2 tabs twice daily and further increase pending output.  We also started cholestyramine for possible component of bile acid malabsorption, no significant improvement with the start of this though only using 1 packet/day.  Pending stool pattern with increase in fiber and Lomotil could consider adjusting.  She continues to feel dehydrated, getting benefit from outpatient IV fluids, she should continue this at least 3 days/week.  She has also found benefit from adjusting dietary and fluid intake with small frequent meals/snacks of well-tolerated food as instructed by dietitian and small sips of oral rehydration solution throughout the day.  She has met with colorectal surgery, yesterday for preop, and is cleared for surgery planned 12/28/2023.    -- Continue IV Fluids 3 days per week  -- Continue loperamide 2 tabs four times per day  -- Increase lomotil 2 tabs twice times per day, can further increase towards 2 tabs 4 times a day pending output  -- Increase metamucil, 1 Tbs three times per day  -- Continue pantoprazole 40 mg BID   -- Cholestyramine 1 packet/day  -- Utilize Pedialyte or Drip Drop for oral rehydration, try to take frequent sips, avoid chugging  --Small frequent meals and snacks with dietary recommendations provided by dietitian      2. Elevated LFTs  Elevation of Alkaline Phosphatase, ALT and AST starting on draw 7/11/2023. Reassuringly, labs have trended down most recent alk phos was 165, ALT 58, and AST normal at 38.  Work up with hepatitis serologies, ISAEL, antimitochondrial antibody, alpha-1, celiac serologies, SPEP, F-actin have been negative.  Recent MRCP normal.  Of note patient has history of fatty liver and biliary dyskinesia (7% EF on  HIDA). Patient reported significant tylenol use with 4000 mg daily since surgery 9/5/2023 November.  We will continue to trends LFTs, will order recheck with next lab draw.  -- Limit Tylenol use  -- Trend LFTs, will order check with next draw      Return to clinic in 2 months    Thank you for this consultation.  It was a pleasure to participate in the care of this patient; please contact us with any further questions.  I spent a total of 55 minutes, face to face, was spent with this patient, >50% of which was counseling regarding the above delineated issues.    This note was created with voice recognition software, and while reviewed for accuracy, typos may remain.     Beti Issa PA-C  Inflammatory Bowel Disease Program   Division of Gastroenterology, Hepatology and Nutrition  [facility]        IBD HISTORY  Age at diagnosis: 2021  Extent of disease: proctosigmoid   Current UC medications: None  Prior UC surgeries:  total abdominal colectomy and end ileostomy 6/15/21   Prior IBD Medications:   Infliximab, 1 dose in the hospital good response  Vedolizumab, completed induction dosing  Prednisone, minimal response to high-dose oral and IV with significant weight gain  5-ASA, no response  Topical therapies to include enemas, no response    DRUG MONITORING  TPMT enzyme activity:     6-TGN/6-MMPN levels:    Biologic concentration:       DISEASE ASSESSMENT  Endoscopic assessment:   EGD 2/14/23 ([place])  Post-op Diagnoses:        - LA Grade A reflux esophagitis.        - Erythematous mucosa in the prepyloric region of the stomach. Biopsied.        - A few fundic gland polyps.        - A single non-bleeding angioectasia in the duodenum (posterior wall).        - Biopsies were taken with a cold forceps for evaluation of celiac        disease.        - No clear cause of abdominal pain identified. No evidence of recurrent        melanoma.     Surgical Path:  Colon path 6/15/23:  FINAL DIAGNOSIS:   COLON, RESECTION:    - Moderate chronic active colitis with crypt abscess formation ([place]   grade 3)   - Negative for dysplasia and malignancy   - Viable surgical margins   - Two benign lymph nodes (0/2)     Surgical Pathology (9/5/2023):  A. ILEUM, ILEOSTOMY TRIM;  Ileo-cutaneous anastomotic junction with nonspecific mucosa inflammation, congestion, and other features consistent with ileostomy; no dysplasia or malignancy  B. RECTUM, COMPLETION PROCTECTOMY:  Mildly active chronic proctitis with:   -Neutrophilic cryptitis, mucosal atrophy and prominent reactive lymphoid hyperplasias  -Reactive mesorectal lymph nodes;  no dysplasia or malignancy  C. COLON, ANASTOMOTIC RINGS; EXCISION:   -Portions of small intestinal mucosa and wall with no morphologic abnormalities   -Portion of rectal mucosa and wall with chronic proctitis; no dysplasia or malignancy     Enterography:   -- MRE 12/27/2022 no findings for acute inflammation of the bowel    Fecal calprotectin: --    C diff: negative 10/26/23, negative 1/12/23,  was positive 3/2021 treated with vanco taper (at [place])     HPI:   Doretha Yu is a 48 year old female with significant PMH of melanoma unknown primary (2017), s/p left axillary LN dissection (2017, Dr. Cool) in remission, history of adjuvant nivolumab induced colitis in 2018 treated with remicade (x1 at [place]) and Entyvio, h/o c.diff (recent bout, started PO vanc 4/7), seronegative RA on prednisone & tocilizumab, steroid induced DM, history of cushings, chronic pain, with diagnosis of UC in 2021, received Entyvio x2 mostly steroid dependent x3 years.  She had multiple readmissions for hematochezia, acute on chronic abdominal & rectal pain, nausea for refractory UC and concerns regarding risks associated with melanoma recurrence with using standard UC maintenance medications.  She underwent total abdominal colectomy and end ileostomy on 6/15/2021 with Dr. Ram. She is now s/p robotic completion proctectomy with creation of  "J-pouch with IPAA, takedown of end ileostomy, and diverting loop ileostomy 9/5/23. Plan for ileostomy closure 12/27/23.    Ileostomy output has been increased in surgery, however with worsening in past 3 to 4 weeks.     She was hospitalized 10/30-23-11/8/23 given new onset fever and abdominal pain and reports of extreme weakness with ongoing high ileostomy output.  CTE completed which was negative for any intra-abdominal process, fistula, abscess or bowel inflammation.  It did incidentally note pneumonia for which she was treated with 5 days of antibiotics.  Stool studies completed with normal fecal calprotectin negative enteric panel and negative C. difficile from both ostomy and J-pouch.  Adjustments made to bowel regimen including maximizing loperamide at 2 tablets 4 times daily, restarting Lomotil, starting fiber 3 times a day, and starting pantoprazole 40 mg twice daily.  She notes that she does not feel her ostomy output slowed much during the hospital stay.    She has started on outpatient IV fluids 3 times per week and electrolyte check and replacement.    After last visit she met with Shirley our dietitian regarding diet and fluid replacement.  She reports this was helpful, does report that last week was the past week she had and was snacking on small amounts throughout the day which may have slowed and thickened output some.  Reports she has run out of snacks but has grocery delivery today.  She is drinking drip drop and Pedialyte, sipping throughout the day.  Drinking at least 4 to 5 x 20-24 oz ounce water bottle.  Weight continues to trend down, 178 from 208 pounds.    Today she reports that she is no longer measuring output but continues to be similar.  Emptying ostomy bag 7-8 times per day which she estimates is 4076-8242 ml.  Notes increased output last night with needing to get up 4-5 times overnight.  Stool is still liquid, occasionally \"slushy \"after fiber for a couple hours.  No blood in output.  " "Continues to have mucus per rectum.  She reports she continues to feel dehydrated and dizzy at least once per day on days without infusions.  Does report overall infusions are helping and she feels better on these days.    When discussing bowel regimen she reports that she is currently using 8 tabs of loperamide (2 tabs 4 times a day), 2 tabs of Lomotil (1 tab twice a day), 2 packets of fiber (1 packet twice a day), and 1 packet of cholestyramine daily.  Previously has been recommended 8 tabs loperamide, 8 tabs Lomotil, 3 packets of fiber, and 2 packets cholestyramine.    She met with  yesterday.  Pouchoscopy and Gastrografin enema.  No leak and pouch normal.  Plan is to proceed with surgery 12/28/2023.    Liver enzymes last checked 11/27/2023.  Alk phos downtrend to 165, ALT down to 58, AST down to 38 (normal).  MRCP completed 12/11/23 and was normal.    Intake  Output  sIBDQ:  IBDQ Score Date IBDQ - Total Score  (Higher score better)   4/10/2018   7:20 AM 32          ROS:    Constitutional, HEENT, cardiovascular, pulmonary, GI, , musculoskeletal, neuro, skin, endocrine and psych systems are negative, except as otherwise noted.    PHYSICAL EXAMINATION:  Constitutional: aaox3, cooperative, pleasant, not dyspneic/diaphoretic, no acute distress  Vitals reviewed: Ht 1.575 m (5' 2\")   Wt 80.7 kg (178 lb)   LMP  (LMP Unknown)   BMI 32.56 kg/m    Wt:   Wt Readings from Last 2 Encounters:   12/13/23 80.7 kg (178 lb)   11/28/23 83 kg (183 lb)      General appearance: Healthy appearing adult, in no acute distress  Eyes: Sclera anicteric, Pupils round and reactive to light  Ears, nose, mouth and throat: No obvious external lesions of ears and nose, Hearing intact  Neck: Symmetric, No obvious external lesions  Respiratory: Normal respiration, no use of accessory muscles   MSK: Gait normal  Skin: No rashes or jaundice   Psychiatric: Oriented to person, place and time, Appropriate mood and affect. "       PERTINENT PAST MEDICAL HISTORY:  Past Medical History:   Diagnosis Date    Abnormal MRI     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     Anxiety     Basal cell carcinoma     Cervical high risk HPV (human papillomavirus) test positive 2019    See problem list    Colitis     Depression     Diabetes mellitus, iatrogenic (H) 2020    Esophageal reflux     Inflammatory arthritis     Insomnia     Intestinal giardiasis 2018    Lumbago     left lower back pain    Lymphedema     Malignant melanoma (H)     Melanoma (H) 10/23/2017    Migraines     Mild persistent asthma     Morbid obesity with BMI of 40.0-44.9, adult (H)     STORMY (obstructive sleep apnea)     Prothrombin deficiency (H)     takes 81mg asa daily    Stroke (cerebrum) (H)     During     TIA (transient ischemic attack)     Type 2 diabetes mellitus (H)     Ulcerative pancolitis (H)        PREVIOUS SURGERIES:  Past Surgical History:   Procedure Laterality Date    APPENDECTOMY      COLONOSCOPY N/A 10/18/2017    Procedure: COLONOSCOPY;  Colon;  Surgeon: Debbie Stephens MD;  [facility]    COLONOSCOPY N/A 2018    Procedure: COMBINED COLONOSCOPY, SINGLE OR MULTIPLE BIOPSY/POLYPECTOMY BY BIOPSY;  colon;  Surgeon: Benita Schumacher MD;  Location: [facility]    COLONOSCOPY      multiple since  to present - about 6 total    DAVINCI ASSISTED TRANSANAL TOTAL MESORECTAL EXCISION N/A 2023    Procedure: COMPLETION PROCTECTOMY, ROBOT-ASSISTED, ILEAL POUCH ANASTAMOSIS;  Surgeon: Quinton Ram MD;  [facility]    DISSECT LYMPH NODE AXILLA Left 10/23/2017    Procedure: DISSECT LYMPH NODE AXILLA;  Left Axillary Lymph Node Dissection ;  Surgeon: Laurent Cool MD;  [facility]    EXAM UNDER ANESTHESIA PELVIC N/A 2020    Procedure: EXAM UNDER ANESTHESIA, PELVIS; with Cervical Biopsies, Vaginal Biopsy and Endocervical Curettings;  Surgeon: Melina Jung MD;  Location: [facility]    GYN SURGERY   ,         LAPAROSCOPIC ASSISTED COLECTOMY N/A 06/15/2021    Procedure: laparoscopic total abdominal colectomy, end ileostomy;  Surgeon: Quinton Ram MD;  Location: [facility]    REPAIR MOHS Left 2017    Procedure: REPAIR MOHS;  Left Upper Lid Moh's Reconstruction;  Surgeon: Kisha Bosch MD;  [facility]    SIGMOIDOSCOPY FLEXIBLE N/A 2023    Procedure: Sigmoidoscopy flexible;  Surgeon: Quinton Ram MD;  [facility]: [facility]       ALLERGIES:     Allergies   Allergen Reactions    Bee Venom Swelling    Azithromycin Diarrhea    Erythromycin      Other reaction(s): GI intolerance, Vomiting    Fentanyl Other (See Comments)     sweating  sweating    Prochlorperazine Fatigue     Other reaction(s): Other (see comments)  Fatigue    Buspirone      Other reaction(s): GI intolerance  vomiting    Erythrocin Nausea and Vomiting    Gluten Meal      Celiac disease    Topamax [Topiramate]      Made her lethargic    Zithromax [Azithromycin Dihydrate] Diarrhea    Enbrel [Etanercept] Hives and Rash       PERTINENT MEDICATIONS:    Current Outpatient Medications:     acetaminophen (TYLENOL) 500 MG tablet, Take 1,000 mg by mouth every 6 hours as needed for mild pain, Disp: , Rfl:     albuterol (PROAIR HFA/PROVENTIL HFA/VENTOLIN HFA) 108 (90 Base) MCG/ACT inhaler, Inhale 2 puffs into the lungs every 4 hours as needed for shortness of breath / dyspnea, Disp: 18 g, Rfl: 1    artificial saliva (BIOTENE MT) SOLN solution, Swish and spit 2 mLs (2 sprays) in mouth 4 times daily, Disp: 44.3 mL, Rfl: 0    benzocaine-menthol (CHLORASEPTIC MAX) 15-10 MG lozenge, Place 1 lozenge inside cheek every hour as needed, Disp: 15 lozenge, Rfl: 0    cholestyramine (QUESTRAN) 4 g packet, Take 1 packet (4 g) by mouth 2 times daily (with meals), Disp: 60 packet, Rfl: 1    Cyanocobalamin (B-12 PO), Take 1 tablet by mouth daily, Disp: , Rfl:     famotidine (PEPCID) 20 MG tablet, Take 1 tablet (20 mg) by mouth 2  times daily, Disp: 60 tablet, Rfl: 0    guaiFENesin-dextromethorphan (ROBITUSSIN DM) 100-10 MG/5ML syrup, Take 10 mLs by mouth every 4 hours as needed for cough, Disp: 236 mL, Rfl: 0    hydrOXYzine (ATARAX) 25 MG tablet, Take 1 tablet (25 mg) by mouth 2 times daily, Disp: 60 tablet, Rfl: 1    magic mouthwash suspension, diphenhydrAMINE, lidocaine, aluminum-magnesium & simethicone, (FIRST-MOUTHWASH BLM) compounding kit, Swish and swallow 10 mLs in mouth every 6 hours as needed for mouth sores, Disp: 237 mL, Rfl: 0    medical cannabis (Patient's own supply), See Admin Instructions (The purpose of this order is to document that the patient reports taking medical cannabis.  This is not a prescription, and is not used to certify that the patient has a qualifying medical condition.), Disp: , Rfl:     medical cannabis (Patient's own supply), See Admin Instructions (The purpose of this order is to document that the patient reports taking medical cannabis.  This is not a prescription, and is not used to certify that the patient has a qualifying medical condition.), Disp: , Rfl:     metFORMIN (GLUCOPHAGE) 500 MG tablet, TAKE 2 TABLETS BY MOUTH 2 TIMES DAILY WITH MEALS (Patient taking differently: Take 500 mg by mouth 2 times daily (with meals) TAKE 2 TABLETS BY MOUTH 2 TIMES DAILY WITH MEALS), Disp: 360 tablet, Rfl: 1    methocarbamol (ROBAXIN) 750 MG tablet, Take 1 tablet (750 mg) by mouth 4 times daily as needed for muscle spasms, Disp: 40 tablet, Rfl: 0    ondansetron (ZOFRAN ODT) 4 MG ODT tab, Take 1 tablet (4 mg) by mouth every 8 hours as needed for nausea or vomiting, Disp: 10 tablet, Rfl: 1    Ostomy Supplies MISC, 20 each daily, Disp: 20 each, Rfl: 11    terconazole (TERAZOL 7) 0.4 % vaginal cream, Place 1 applicator vaginally at bedtime For 7 days please give the correct quantity and It says the tube is 45 grams, Disp: 15 g, Rfl: 0    triamcinolone (KENALOG) 0.1 % paste, Take by mouth 2 times daily, Disp: 5 g, Rfl:  0    venlafaxine (EFFEXOR) 75 MG tablet, TAKE TWO TABLETS BY MOUTH TWICE A DAY, Disp: 360 tablet, Rfl: 0    ARIPiprazole (ABILIFY) 2 MG tablet, Take 1 tablet (2 mg) by mouth daily for 30 days, Disp: 30 tablet, Rfl: 0    sucralfate (CARAFATE) 1 GM/10ML suspension, , Disp: , Rfl:   No current facility-administered medications for this visit.    Facility-Administered Medications Ordered in Other Visits:     lidocaine 1 % 9 mL, 9 mL, Intradermal, Once, Anna Naidu MD    sodium bicarbonate 8.4 % injection 1 mEq, 1 mEq, Intradermal, Once, Anna Naidu MD    SOCIAL HISTORY:  Social History     Socioeconomic History    Marital status:      Spouse name: Not on file    Number of children: Not on file    Years of education: Not on file    Highest education level: Not on file   Occupational History    Not on file   Tobacco Use    Smoking status: Former     Packs/day: 1.00     Years: 5.00     Additional pack years: 0.00     Total pack years: 5.00     Types: Cigarettes     Quit date: 3/20/1998     Years since quittin.7    Smokeless tobacco: Never   Vaping Use    Vaping Use: Never used   Substance and Sexual Activity    Alcohol use: Not Currently    Drug use: Yes     Types: Marijuana     Comment: vape, edible    Sexual activity: Not Currently     Partners: Male     Birth control/protection: Surgical   Other Topics Concern    Parent/sibling w/ CABG, MI or angioplasty before 65F 55M? No   Social History Narrative    20 y.o- patient's mother   of lung cancer. She had to take care of her younger sister.    2012- patient's  had a heart attack with stents placed, followed by cardiac rehabilitation    2000 TO 2012  was in [facility] for depression    2013 patient's  went through alcohol rehabilitation at [facility]            They have attended couple counseling a couple of times and patient went to the family program for  "chemical dependency.    Patient denies alcohol or drug use and herself            Has 2 children, girls ages 17 and 14. Oldest has been a \"trouble maker.\"     For a while she was working 3 jobs since her  was ill. 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 Reports her job is very stressful.         Social Determinants of Health     Financial Resource Strain: High Risk (6/28/2021)    Overall Financial Resource Strain (CARDIA)     Difficulty of Paying Living Expenses: Very hard   Food Insecurity: No Food Insecurity (6/28/2021)    Hunger Vital Sign     Worried About Running Out of Food in the Last Year: Never true     Ran Out of Food in the Last Year: Never true   Transportation Needs: No Transportation Needs (6/28/2021)    PRAPARE - Transportation     Lack of Transportation (Medical): No     Lack of Transportation (Non-Medical): No   Physical Activity: Not on file   Stress: Stress Concern Present (6/25/2021)    [employer] - Occupational Stress Questionnaire     Feeling of Stress : Very much   Social Connections: Unknown (6/25/2021)    Social Connection and Isolation Panel [NHANES]     Frequency of Communication with Friends and Family: Not asked     Frequency of Social Gatherings with Friends and Family: Not asked     Attends [employer] Services: Not asked     Active Member of Clubs or Organizations: Not asked     Attends Club or Organization Meetings: Not asked     Marital Status:    Interpersonal Safety: Low Risk  (11/28/2023)    Interpersonal Safety     Do you feel physically and emotionally safe where you currently live?: Yes     Within the past 12 months, have you been hit, slapped, kicked or otherwise physically hurt by someone?: No     Within the past 12 months, have you been humiliated or emotionally abused in other ways by your partner or ex-partner?: No   Housing Stability: Not on file       FAMILY HISTORY:  Father with history of colon polyps.  Paternal " grandmother with history of colon cancer in her 50s or 60s.  Father did also history of diverticulitis as well as IBS.  Her child has been diagnosed with IBS.  No history of Crohn's disease.  No history of ulcerative colitis.   Family History   Problem Relation Age of Onset    Cancer Mother 45        lung    Neurologic Disorder Mother         epilepsy    Lipids Father     Gastrointestinal Disease Father         diverticulitis     Depression Father     Colitis Father     Colon Cancer Father     Diverticulitis Father     Depression Sister     Cancer Maternal Grandmother     Blood Disease Maternal Grandmother         lymphoma     Arthritis Maternal Grandmother     Diabetes Maternal Grandmother     Depression Maternal Grandmother     Macular Degeneration Maternal Grandmother     Glaucoma Maternal Grandmother     Diabetes Maternal Grandfather     Cerebrovascular Disease Maternal Grandfather     Blood Disease Maternal Grandfather     Heart Disease Maternal Grandfather     Glaucoma Maternal Grandfather     Cancer Paternal Grandmother     Cancer - colorectal Paternal Grandmother     Colitis Paternal Grandmother     Colon Cancer Paternal Grandmother     Diverticulitis Paternal Grandmother     Respiratory Paternal Grandfather         emphysema     Colitis Paternal Grandfather     Colon Cancer Paternal Grandfather     Diverticulitis Paternal Grandfather     Heart Disease Daughter     Asthma Daughter     Melanoma No family hx of     Anesthesia Reaction No family hx of     Clotting Disorder No family hx of        Past/family/social history reviewed and no changes

## 2023-12-13 NOTE — PROCEDURES
[facility]  Initial Visit Note      PATIENT NAME: Doretha Yu  MRN: [id number]  : 1975  ENCOUNTER DATE: 2023     REFERRING PROVIDER: ***    REASON FOR CURRENT VISIT: ***    HISTORY OF PRESENTING ILLNESS:      Ms. Doretha Yu is a 48 year old  female referred by ***    Ulcerative     -post-partum stroke, was put on ASA. Off ASA in . No APS.   In  - bleeding during surgery and post-op, after     Pregnancies - 3, MTP of first;     Review of Systems:  A full 14 point ROS was negative other than the symptoms noted above in the HPI.      PAST MEDICAL HISTORY     Active Ambulatory Problems     Diagnosis Date Noted    CARDIOVASCULAR SCREENING; LDL GOAL LESS THAN 160 2011    DUB (dysfunctional uterine bleeding) 2011    Anxiety state 2012    Esophageal reflux 2012    Moderate major depression (H) 2013    Mild intermittent asthma without complication 2013    Vision changes 2017    Prothrombin mutation (H24) 2017    Metastatic malignant melanoma (H) 10/10/2017    Malignant melanoma of left upper extremity including shoulder (H) 10/12/2017    Functional diarrhea 2018    Colitis 2018    Rectal bleeding 2018    Bilateral leg cramps 2018    Rash and nonspecific skin eruption 2018    Other chronic pain 2018    Immunosuppressed status (H24) 2019    Ulcerative colitis with complication, unspecified location (H) 2019    Morbid obesity (H) 2019    Cervical high risk HPV (human papillomavirus) test positive 2019    STORMY (obstructive sleep apnea) 2020    Secondary lymphedema 2020    Chronic neutrophilia 2020    Diabetes mellitus, iatrogenic (H) 2020    High risk HPV infection 2020    Abdominal pain 05/10/2018    Anemia 2018    Candidiasis of mouth 05/10/2018    Hypocalcemia 05/15/2018    Malaise 05/10/2018    Menstrual irregularity  05/14/2018    Arthritis, rheumatoid (H) 11/26/2018    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 difficile colitis 04/13/2021    Ulcerative colitis without complications, unspecified location (H) 06/03/2021    Colostomy in place (H) 07/16/2021    S/P colectomy 07/16/2021    Polyarthralgia 04/29/2020    Drug-induced polyneuropathy (H24) 04/29/2020    Arthralgia of both knees 05/12/2018    Adjustment disorder with mixed emotional features 06/16/2020    Migraines     Biliary dyskinesia 01/11/2023    Gastroesophageal reflux disease without esophagitis 09/05/2023    Post-op pain 09/13/2023    High output ileostomy (H) 10/30/2023     Resolved Ambulatory Problems     Diagnosis Date Noted    Cerebral infarction (H) 06/19/2015    Acute non intractable tension-type headache 04/01/2017    Melanoma (H) 10/23/2017    Sepsis due to cellulitis (H) 11/14/2017    Diarrhea 03/04/2018    Intestinal giardiasis 03/05/2018    Influenza-like illness 05/06/2018    Dysphagia 01/28/2019    Knee pain 05/12/2018     Past Medical History:   Diagnosis Date    Abnormal MRI     Anxiety     Basal cell carcinoma     Depression     Inflammatory arthritis     Insomnia     Lumbago     Lymphedema     Malignant melanoma (H)     Mild persistent asthma     Morbid obesity with BMI of 40.0-44.9, adult (H)     Prothrombin deficiency (H)     Stroke (cerebrum) (H) 2004    TIA (transient ischemic attack) 2004    Type 2 diabetes mellitus (H)     Ulcerative pancolitis (H)          PAST SURGICAL HISTORY:      Past Surgical History:   Procedure Laterality Date    APPENDECTOMY  2004    COLONOSCOPY N/A 10/18/2017    Procedure: COLONOSCOPY;  Colon;  Surgeon: Debbie Stephens MD;  Location: [facility]    COLONOSCOPY N/A 2018    Procedure: COMBINED COLONOSCOPY, SINGLE OR MULTIPLE BIOPSY/POLYPECTOMY BY BIOPSY;  colon;  Surgeon: Benita Schumacher MD;  Location: [facility]    COLONOSCOPY      multiple since  to present - about 6 total    DAVINCI ASSISTED TRANSANAL TOTAL MESORECTAL EXCISION N/A 2023    Procedure: COMPLETION PROCTECTOMY, ROBOT-ASSISTED, ILEAL POUCH ANASTAMOSIS;  Surgeon: Quinton Ram MD;  Location: [facility] OR    DISSECT LYMPH NODE AXILLA Left 10/23/2017    Procedure: DISSECT LYMPH NODE AXILLA;  Left Axillary Lymph Node Dissection ;  Surgeon: Laurent Cool MD;  Location: [facility]    EXAM UNDER ANESTHESIA PELVIC N/A 2020    Procedure: EXAM UNDER ANESTHESIA, PELVIS; with Cervical Biopsies, Vaginal Biopsy and Endocervical Curettings;  Surgeon: Melina Jung MD;  Location: [facility] OR    GYN SURGERY  ,         LAPAROSCOPIC ASSISTED COLECTOMY N/A 06/15/2021    Procedure: laparoscopic total abdominal colectomy, end ileostomy;  Surgeon: Quinton Ram MD;  Location: [facility]    REPAIR MOHS Left 2017    Procedure: REPAIR MOHS;  Left Upper Lid Moh's Reconstruction;  Surgeon: Kisha Bosch MD;  Location: [facility]    SIGMOIDOSCOPY FLEXIBLE N/A 2023    Procedure: Sigmoidoscopy flexible;  Surgeon: Quinton Ram MD;  Location: [facility]        SOCIAL HISTORY:     Social History     Tobacco Use    Smoking status: Former     Packs/day: 1.00     Years: 5.00     Additional pack years: 0.00     Total pack years: 5.00     Types: Cigarettes     Quit date: 3/20/1998     Years since quittin.7    Smokeless tobacco: Never   Vaping Use    Vaping Use: Never used   Substance Use Topics    Alcohol use: Not Currently    Drug use: Yes     Types: Marijuana     Comment: vape, edible     Lives at home, very tired most of the time.   Uses medical marijuana - vape.     FAMILY HISTORY:     Maternal grandfather had an ICH - required ICH  Mother - lung cancer  Paternal  grandfather - colon cancer    ALLEGIES:     Allergies   Allergen Reactions    Bee Venom Swelling    Azithromycin Diarrhea    Erythromycin      Other reaction(s): GI intolerance, Vomiting    Fentanyl Other (See Comments)     sweating  sweating    Prochlorperazine Fatigue     Other reaction(s): Other (see comments)  Fatigue    Buspirone      Other reaction(s): GI intolerance  vomiting    Erythrocin Nausea and Vomiting    Gluten Meal      Celiac disease    Topamax [Topiramate]      Made her lethargic    Zithromax [Azithromycin Dihydrate] Diarrhea    Enbrel [Etanercept] Hives and Rash       CURRENT MEDICATIONS:        Current Outpatient Medications:     acetaminophen (TYLENOL) 500 MG tablet, Take 1,000 mg by mouth every 6 hours as needed for mild pain, Disp: , Rfl:     albuterol (PROAIR HFA/PROVENTIL HFA/VENTOLIN HFA) 108 (90 Base) MCG/ACT inhaler, Inhale 2 puffs into the lungs every 4 hours as needed for shortness of breath / dyspnea, Disp: 18 g, Rfl: 1    ARIPiprazole (ABILIFY) 2 MG tablet, Take 1 tablet (2 mg) by mouth daily for 30 days, Disp: 30 tablet, Rfl: 0    artificial saliva (BIOTENE MT) SOLN solution, Swish and spit 2 mLs (2 sprays) in mouth 4 times daily, Disp: 44.3 mL, Rfl: 0    benzocaine-menthol (CHLORASEPTIC MAX) 15-10 MG lozenge, Place 1 lozenge inside cheek every hour as needed, Disp: 15 lozenge, Rfl: 0    cholestyramine (QUESTRAN) 4 g packet, Take 1 packet (4 g) by mouth 2 times daily (with meals), Disp: 60 packet, Rfl: 1    Cyanocobalamin (B-12 PO), Take 1 tablet by mouth daily, Disp: , Rfl:     famotidine (PEPCID) 20 MG tablet, Take 1 tablet (20 mg) by mouth 2 times daily, Disp: 60 tablet, Rfl: 0    guaiFENesin-dextromethorphan (ROBITUSSIN DM) 100-10 MG/5ML syrup, Take 10 mLs by mouth every 4 hours as needed for cough, Disp: 236 mL, Rfl: 0    hydrOXYzine (ATARAX) 25 MG tablet, Take 1 tablet (25 mg) by mouth 2 times daily, Disp: 60 tablet, Rfl: 1    magic mouthwash suspension, diphenhydrAMINE,  lidocaine, aluminum-magnesium & simethicone, (FIRST-MOUTHWASH BLM) compounding kit, Swish and swallow 10 mLs in mouth every 6 hours as needed for mouth sores, Disp: 237 mL, Rfl: 0    medical cannabis (Patient's own supply), See Admin Instructions (The purpose of this order is to document that the patient reports taking medical cannabis.  This is not a prescription, and is not used to certify that the patient has a qualifying medical condition.), Disp: , Rfl:     medical cannabis (Patient's own supply), See Admin Instructions (The purpose of this order is to document that the patient reports taking medical cannabis.  This is not a prescription, and is not used to certify that the patient has a qualifying medical condition.), Disp: , Rfl:     metFORMIN (GLUCOPHAGE) 500 MG tablet, TAKE 2 TABLETS BY MOUTH 2 TIMES DAILY WITH MEALS (Patient taking differently: Take 500 mg by mouth 2 times daily (with meals) TAKE 2 TABLETS BY MOUTH 2 TIMES DAILY WITH MEALS), Disp: 360 tablet, Rfl: 1    methocarbamol (ROBAXIN) 750 MG tablet, Take 1 tablet (750 mg) by mouth 4 times daily as needed for muscle spasms, Disp: 40 tablet, Rfl: 0    ondansetron (ZOFRAN ODT) 4 MG ODT tab, Take 1 tablet (4 mg) by mouth every 8 hours as needed for nausea or vomiting, Disp: 10 tablet, Rfl: 1    Ostomy Supplies MISC, 20 each daily, Disp: 20 each, Rfl: 11    sucralfate (CARAFATE) 1 GM/10ML suspension, , Disp: , Rfl:     terconazole (TERAZOL 7) 0.4 % vaginal cream, Place 1 applicator vaginally at bedtime For 7 days please give the correct quantity and It says the tube is 45 grams, Disp: 15 g, Rfl: 0    triamcinolone (KENALOG) 0.1 % paste, Take by mouth 2 times daily, Disp: 5 g, Rfl: 0    venlafaxine (EFFEXOR) 75 MG tablet, TAKE TWO TABLETS BY MOUTH TWICE A DAY, Disp: 360 tablet, Rfl: 0  No current facility-administered medications for this visit.    Facility-Administered Medications Ordered in Other Visits:     lidocaine 1 % 9 mL, 9 mL, Intradermal, Once,  Anna Naidu MD    sodium bicarbonate 8.4 % injection 1 mEq, 1 mEq, Intradermal, Once, Anna Naidu MD      PHYSICAL EXAMINATION:      Vital signs: LMP  (LMP Unknown)   PHYSICAL EXAMINATION ATTAINABLE DURING VIDEO VISIT:  CONSTITUTIONAL - Pt looks like stated age, pleasant, not in acute distress. Not obese.  NEURO: Oriented to time, person, and places. No tremor.  ENT, MOUTH: Pupils are equal.  Sclerae are anicteric.  Moist oral mucosa. No oral thrush.   NECK:  No jugular venous distention.  No thyroid enlargement.   RESPIRATORY: talk nl, no sob during conversation, no cough.   MUSCULOSKELETAL/EXTREMITIES:  No edema.  No joint deformity. Normal range of motion  SKIN:  No petechiae.  No rash.  No signs of cellulitis.   PSYCHIATRIC: Normal mood and affect. Good memory. Proper insight and judgement.   THE REST OF A COMPREHENSIVE PHYSICIAL EXAM IS DEFERRED DUE TO COVID-19 PUBLIC HEALTH EMERGENCY RELATED VIDEO VISIT RESCTRICTION.      LABS:      Recent Labs   Lab Test 12/11/23  0908 11/25/23  1945 11/08/23  0929 11/07/23  0703 11/03/23  0607 11/02/23  0659 11/01/23  0701   WBC  --  11.8* 11.8* 10.5   < > 6.2 5.5   RBC  --  4.66 3.69* 3.56*   < > 3.92 3.74*   HGB 11.8 12.8 10.1* 9.6*   < > 10.6* 10.4*   HCT  --  38.8 31.6* 30.5*   < > 33.7* 32.7*   MCV  --  83 86 86   < > 86 87   MCH  --  27.5 27.4 27.0   < > 27.0 27.8   MCHC  --  33.0 32.0 31.5   < > 31.5 31.8   RDW  --  16.7* 16.5* 16.5*   < > 16.2* 16.2*   PLT  --  419 457* 449   < > 278 236   NEUTROPHIL  --  59  --   --   --  47 54   LYMPH  --  31  --   --   --  38 31    < > = values in this interval not displayed.     Recent Labs   Lab Test 12/11/23  0908 12/04/23  1415 11/27/23  1301    135 135   POTASSIUM 4.2 3.9 4.4   CHLORIDE 100 100 100   CO2 22 24 19*   ANIONGAP 15 11 16*   GLC 99 137* 104*   BUN 18.2 12.9 16.4   CR 0.93 0.71 0.77   PATRICIA 9.8 10.1* 10.4*   MAG 1.8 1.7 1.6*     Recent Labs   Lab Test 11/27/23  1301 11/25/23  1945 11/13/23  0922    BILITOTAL 0.2 0.2 <0.2   ALKPHOS 165* 180* 155*   AST 38 60* 26   ALT 58* 82* 32   PROTTOTAL 8.1 8.0 7.3   ALBUMIN 4.5 4.6 4.0     Recent Labs   Lab Test 10/30/23  1436 09/13/23  1411 01/12/23  0736 05/06/21  1356   INR 1.07 0.99 0.95 0.96   PTT 29 26  --  23         IMAGING          PATHOLOGY:            ASSESSMENT AND PLAN:            The patient was seen and discussed with attending physician  *** who agreed with the above history, physical and assessment/plan.     Kevin Lopez   PGY-6 Fellow, Division of Hematology, Oncology and Transplantation  [facility]

## 2023-12-13 NOTE — PROGRESS NOTES
"    [facility]   Initial Visit Note      PATIENT NAME: Doretha Yu  MRN: [id number]  : 1975  ENCOUNTER DATE: 2023     REFERRING PROVIDER: Madeline Easton MD    REASON FOR CURRENT VISIT: Guidance on anticoagulation, concern for thrombophilia due to prior history of prothrombin gene mutation     HISTORY OF PRESENTING ILLNESS:      Ms. Doretha Yu is a 48 year old F with PMH as noted below and significant for chronic ICI-induced ulcerative proctocolitis (s/p total colectomy in  and proctectomy/mesorectal excision) in ), prior melanoma s/p surgical resection and limited adjuvant ICI (nivolumab) in  who presents today for evaluation of concern for thrombophilia. Patient appears well-informed of her medical history, additional information obtained from EMR including prior Heme/Onc consult note in .     Patient's first occurrence of a known thrombosis was in  around the peripartum period of her first successful pregnancy (age ~28) - she developed left sided hemiparesis (mild) and was diagnosed with a CVA. She was placed on ASA at that time. She reports that it was attributed to   prolonged labor requiring a  section. She reports that she underwent a work-up for possible hypercoagulability at that time and was told she had a prothrombin gene mutation. She was subsequently on long-term daily ASA 81mg for secondary stroke ppx but was not put on any anticoagulation. She reports that her second pregnancy was in , and she underwent a LSCS for delilvery and had internal bleeding of some form due to preventive heparin use around time of surgery. She did see a hematologist/oncologist (Dr. Cohen) in  - he notes  \"A hypercoagulability workup was also done.  I have some of those laboratory results.  Plasminogen activity was normal at 190.  Lupus anticoagulant screen was negative.  She had negative antiphospholipid antibody titers.  Antithrombin III, protein " "C&S activity were all within normal limits.  Fibrinogen was normal.  Homocysteine level was normal.  Leiden 5 and prothrombin gene mutations were sent off. \" She did have an echocardiogram with bubble study that was negative for evidence of a PFO.     She has not had any evidence of thromboses (either arterial like stroke,MI,etc) or venous thromboembolism since then. She had a salpingectomy so no further pregnancies. She is not on any oral contraceptive pills. She has struggled with ICI-induced ulcerative colitis since receiving adjuvant nivolumab for melanoma in , necessitating a major abdominal surgery (total colectomy) in  and subsequent proctectomy, mesorectal excision earlier this year in Sept. This has been quite debilitating and she has been on disability since her  surgery, and has several ongoing GI symptoms including increased ileostomy output, poor appetite, fatigue. They are planning for ileostomy reversal shortly and she is looking forward to that. She has been off ASA since her diagnosis of ICI-colitis in . She has not had any recent major bleeding manifestations. Prior to 2017, she tolerated ASA fine without any bleeding.       Review of Systems:  A full 14 point ROS was negative other than the symptoms noted above in the HPI.      PAST MEDICAL HISTORY     Metastatic melanoma diag 2017, s/p surgical excision (including left axillary lymphadenectomy followed by adjuvant nivolumab)   ICI induced ulcerative proctocolitis, diag    Type 2 diabetes mellitis  Obstructive sleep apnea  GERD  Mood disorder  Dyskinesia, NAFLD    PAST SURGICAL HISTORY:      Appendectomy-   section-,   Axillary lymph node dissection-  Total abdominal colectomy (laparoscopic)-2021  Robotic transanal total mesorectal excision with complete proctectomy ileal pouch anastomosis-2023    SOCIAL HISTORY:     Smoked for 5 years during teenage years. Not since then. No significant ETOH use. " Occasionally uses medical cannabis, vapes. No other recreational substances. 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   Briefly used OCPs during teen years, not since then. Had a salpingectomy for contraception.     FAMILY HISTORY:   Cancer in mother  Colon cancer  Paternal grandmother had colon cancer  History of recurrent clots or bleeding disorder  No family history of recurrent pregnancy morbidity    She states that both her sons were tested for prothrombin gene mutation were negative    ALLEGIES:     Allergies reviewed and noted in Allergies tab    CURRENT MEDICATIONS:        Current Outpatient Medications:     acetaminophen (TYLENOL) 500 MG tablet, Take 1,000 mg by mouth every 6 hours as needed for mild pain, Disp: , Rfl:     albuterol (PROAIR HFA/PROVENTIL HFA/VENTOLIN HFA) 108 (90 Base) MCG/ACT inhaler, Inhale 2 puffs into the lungs every 4 hours as needed for shortness of breath / dyspnea, Disp: 18 g, Rfl: 1    ARIPiprazole (ABILIFY) 2 MG tablet, Take 1 tablet (2 mg) by mouth daily for 30 days, Disp: 30 tablet, Rfl: 0    artificial saliva (BIOTENE MT) SOLN solution, Swish and spit 2 mLs (2 sprays) in mouth 4 times daily, Disp: 44.3 mL, Rfl: 0    benzocaine-menthol (CHLORASEPTIC MAX) 15-10 MG lozenge, Place 1 lozenge inside cheek every hour as needed, Disp: 15 lozenge, Rfl: 0    cholestyramine (QUESTRAN) 4 g packet, Take 1 packet (4 g) by mouth 2 times daily (with meals), Disp: 60 packet, Rfl: 1    Cyanocobalamin (B-12 PO), Take 1 tablet by mouth daily, Disp: , Rfl:     famotidine (PEPCID) 20 MG tablet, Take 1 tablet (20 mg) by mouth 2 times daily, Disp: 60 tablet, Rfl: 0    guaiFENesin-dextromethorphan (ROBITUSSIN DM) 100-10 MG/5ML syrup, Take 10 mLs by mouth every 4 hours as needed for cough, Disp: 236 mL, Rfl: 0    hydrOXYzine (ATARAX) 25 MG tablet, Take 1 tablet (25 mg) by mouth 2 times daily, Disp: 60 tablet, Rfl: 1    magic mouthwash suspension, diphenhydrAMINE,  lidocaine, aluminum-magnesium & simethicone, (FIRST-MOUTHWASH BLM) compounding kit, Swish and swallow 10 mLs in mouth every 6 hours as needed for mouth sores, Disp: 237 mL, Rfl: 0    medical cannabis (Patient's own supply), See Admin Instructions (The purpose of this order is to document that the patient reports taking medical cannabis.  This is not a prescription, and is not used to certify that the patient has a qualifying medical condition.), Disp: , Rfl:     medical cannabis (Patient's own supply), See Admin Instructions (The purpose of this order is to document that the patient reports taking medical cannabis.  This is not a prescription, and is not used to certify that the patient has a qualifying medical condition.), Disp: , Rfl:     metFORMIN (GLUCOPHAGE) 500 MG tablet, TAKE 2 TABLETS BY MOUTH 2 TIMES DAILY WITH MEALS (Patient taking differently: Take 500 mg by mouth 2 times daily (with meals) TAKE 2 TABLETS BY MOUTH 2 TIMES DAILY WITH MEALS), Disp: 360 tablet, Rfl: 1    methocarbamol (ROBAXIN) 750 MG tablet, Take 1 tablet (750 mg) by mouth 4 times daily as needed for muscle spasms, Disp: 40 tablet, Rfl: 0    ondansetron (ZOFRAN ODT) 4 MG ODT tab, Take 1 tablet (4 mg) by mouth every 8 hours as needed for nausea or vomiting, Disp: 10 tablet, Rfl: 1    Ostomy Supplies MISC, 20 each daily, Disp: 20 each, Rfl: 11    sucralfate (CARAFATE) 1 GM/10ML suspension, , Disp: , Rfl:     terconazole (TERAZOL 7) 0.4 % vaginal cream, Place 1 applicator vaginally at bedtime For 7 days please give the correct quantity and It says the tube is 45 grams, Disp: 15 g, Rfl: 0    triamcinolone (KENALOG) 0.1 % paste, Take by mouth 2 times daily, Disp: 5 g, Rfl: 0    venlafaxine (EFFEXOR) 75 MG tablet, TAKE TWO TABLETS BY MOUTH TWICE A DAY, Disp: 360 tablet, Rfl: 0  No current facility-administered medications for this visit.    Facility-Administered Medications Ordered in Other Visits:     lidocaine 1 % 9 mL, 9 mL, Intradermal, Once,  Anna Naidu MD    sodium bicarbonate 8.4 % injection 1 mEq, 1 mEq, Intradermal, Once, Anna Naidu MD    PHYSICAL EXAMINATION:      PHYSICAL EXAMINATION ATTAINABLE DURING VIDEO VISIT:  CONSTITUTIONAL - Pt looks like stated age, pleasant, appears fatigued.   NEURO: Oriented to time, person, and places. No tremor.  ENT, MOUTH: Pupils are equal.  Sclerae are anicteric.    NECK:  No jugular venous distention.     RESPIRATORY: talk nl, no sob during conversation, no cough.   MUSCULOSKELETAL/EXTREMITIES:  No edema.  No joint deformity. Normal range of motion  SKIN:  No petechiae.  No rash.  No signs of cellulitis.   PSYCHIATRIC: Normal mood and affect. Good memory. Proper insight and judgement.   THE REST OF A COMPREHENSIVE PHYSICIAL EXAM IS DEFERRED      LABS:      Recent Labs   Lab Test 12/11/23  0908 11/25/23  1945 11/08/23  0929 11/07/23  0703 11/03/23  0607 11/02/23  0659 11/01/23  0701   WBC  --  11.8* 11.8* 10.5   < > 6.2 5.5   RBC  --  4.66 3.69* 3.56*   < > 3.92 3.74*   HGB 11.8 12.8 10.1* 9.6*   < > 10.6* 10.4*   HCT  --  38.8 31.6* 30.5*   < > 33.7* 32.7*   MCV  --  83 86 86   < > 86 87   MCH  --  27.5 27.4 27.0   < > 27.0 27.8   MCHC  --  33.0 32.0 31.5   < > 31.5 31.8   RDW  --  16.7* 16.5* 16.5*   < > 16.2* 16.2*   PLT  --  419 457* 449   < > 278 236   NEUTROPHIL  --  59  --   --   --  47 54   LYMPH  --  31  --   --   --  38 31    < > = values in this interval not displayed.     Recent Labs   Lab Test 12/11/23  0908 12/04/23  1415 11/27/23  1301    135 135   POTASSIUM 4.2 3.9 4.4   CHLORIDE 100 100 100   CO2 22 24 19*   ANIONGAP 15 11 16*   GLC 99 137* 104*   BUN 18.2 12.9 16.4   CR 0.93 0.71 0.77   PATRICIA 9.8 10.1* 10.4*   MAG 1.8 1.7 1.6*     Recent Labs   Lab Test 11/27/23  1301 11/25/23  1945 11/13/23  0922   BILITOTAL 0.2 0.2 <0.2   ALKPHOS 165* 180* 155*   AST 38 60* 26   ALT 58* 82* 32   PROTTOTAL 8.1 8.0 7.3   ALBUMIN 4.5 4.6 4.0     Recent Labs   Lab Test 10/30/23  1436 09/13/23  1411  01/12/23  0736 05/06/21  1356   INR 1.07 0.99 0.95 0.96   PTT 29 26  --  23      05/05/17 15:28   Cardiolipin Antibody IgG <1.6  Negative     Cardiolipin Antibody IgM 1.6         ASSESSMENT AND PLAN:      Doretha Yu is a 48 female with history of melanoma s/p surgical excision followed by adjuvant nivolumab and subsequent ICI-related severe ulcerative colitis/proctitis requiring extensive bowel resection surgeries. She has been referred to hematology for further evaluation of possible history of thrombophilia due to historic documentation of a prothrombin gene mutation.    We reviewed patient's personal and family history of arterial venous thrombosis in detail over the past several decades.  Her only history of thrombosis is a stroke in 2004 was likely peripartum and estrogen-provoked. Heterozygous prothrombin gene mutation by itself does not confer a significantly higher risk of VTE in the absence of prior VTE.  She does not have any personal history of DVT or PE.  She does not have a history of pregnancy morbidity.  She does not have a family history suggestive of an inherited thrombophilia.  She does not have any major thrombogenic risk factors currently like smoking or combination estrogen-containing contraceptive pills and has no plan for future pregnancy.     We do not think that she warrants further testing for inherited or acquired thrombophilia at this time.  We do not think she requires long-term anticoagulation for secondary prophylaxis of thromboses (either arterial or venous).  We would of course recommend routine thromboprophylaxis per protocol during hospitalization and surgery. We had an extensive discussion regarding all of the above with patient and provided reassurance regarding her risks of thrombosis. She was in agreement with this plan.     Plan:  No indication for long term anticoagulation or anti-platelet agent  No further thrombophilia testing required  Routine thromboppx during  hospitalization or surgery.     The patient was seen and discussed with attending physician Dr. Walter Valente MD who agreed with the above history, physical and assessment/plan.     Kevin Lopez   PGY-6 Fellow, Division of Hematology, Oncology and Transplantation  [facility]     Physician Attestation   I saw this patient with the resident and agree with the resident s findings and plan of care as documented in the resident s note.      Briefly, Doretha is a 48 year old woman with estrogen provoked arterial thrombosis and heterozygosity of prothrombin gene mutation.  Neither of these require long-term anticoagulation.    45 minutes spent by me on the date of the encounter doing chart review, review of test results, interpretation of tests, patient visit, and documentation     Walter Valente MD   of Medicine  [facility]

## 2023-12-13 NOTE — LETTER
12/13/2023         RE: Doretha Yu  [address]  [facility] [address]        Dear Colleague,    Thank you for referring your patient, Doretha Yu, to the [facility] [place]. Please see a copy of my visit note below.    Doretha Yu is a 48 year old female who is being evaluated via a billable video visit.      IBD CLINIC VISIT     CC/REFERRING MD:  No ref. provider found  REASON FOR CONSULTATION: high ilestomy output, elevated LFTs    ASSESSMENT/PLAN    48 year old female with history prothrombin deficiency, CVA in 2004 after giving birth, asthma,  chronic pain, depression, anxiety,  melanoma and nivolumab induced UC now s/p total abdominal colectomy and end ileostomy on 6/15/2021 with Dr. Ram and robotic completion proctectomy with creation of J-pouch with IPAA, takedown of end ileostomy, and diverting loop ileostomy 9/5/23. Following up in clinic due to high ileostomy output and elevated LFTs.    1. Ulcerative colitis:   2. High output ostomy  UC proctosigmoiditis now s/p  total abdominal colectomy and end ileostomy on 6/15/2021, completion proctectomy with creation of J-pouch with IPAA, takedown of end ileostomy, and diverting loop ileostomy 9/5/23. Surgical path consistent with moderate disease. She continues to follow closely with colorectal surgery at this time.  Since second surgery has had increased outputs, most recently was slight decrease to 6893-4512 ML per day.  Work-up with infectious stool studies including enteric panel and C. difficile have been negative, inflammatory markers have been elevated however fecal calprotectin normal and CT enterography done which did not show any evidence of bowel inflammation.  It is not fully clear what is driving her high output.  We have rechecked infectious stool studies with ova and parasite, Cryptosporidium, microsporidia and, and C. difficile results pending.  Fiber supplement has thickened output some, we  will continue this, only using twice daily currently, will increase to 3 times daily.  Will also continue max dose loperamide at 2 tabs 4 times per day.  Patient currently using Lomotil 1 tab twice daily, has previously been recommended up to 8 tabs per day, will start with 2 tabs twice daily and further increase pending output.  We also started cholestyramine for possible component of bile acid malabsorption, no significant improvement with the start of this though only using 1 packet/day.  Pending stool pattern with increase in fiber and Lomotil could consider adjusting.  She continues to feel dehydrated, getting benefit from outpatient IV fluids, she should continue this at least 3 days/week.  She has also found benefit from adjusting dietary and fluid intake with small frequent meals/snacks of well-tolerated food as instructed by dietitian and small sips of oral rehydration solution throughout the day.  She has met with colorectal surgery, yesterday for preop, and is cleared for surgery planned 12/28/2023.    -- Continue IV Fluids 3 days per week  -- Continue loperamide 2 tabs four times per day  -- Increase lomotil 2 tabs twice times per day, can further increase towards 2 tabs 4 times a day pending output  -- Increase metamucil, 1 Tbs three times per day  -- Continue pantoprazole 40 mg BID   -- Cholestyramine 1 packet/day  -- Utilize Pedialyte or Drip Drop for oral rehydration, try to take frequent sips, avoid chugging  --Small frequent meals and snacks with dietary recommendations provided by dietitian      2. Elevated LFTs  Elevation of Alkaline Phosphatase, ALT and AST starting on draw 7/11/2023. Reassuringly, labs have trended down most recent alk phos was 165, ALT 58, and AST normal at 38.  Work up with hepatitis serologies, ISAEL, antimitochondrial antibody, alpha-1, celiac serologies, SPEP, F-actin have been negative.  Recent MRCP normal.  Of note patient has history of fatty liver and biliary dyskinesia  (7% EF on HIDA). Patient reported significant tylenol use with 4000 mg daily since surgery 9/5/2023 November.  We will continue to trends LFTs, will order recheck with next lab draw.  -- Limit Tylenol use  -- Trend LFTs, will order check with next draw      Return to clinic in 2 months    Thank you for this consultation.  It was a pleasure to participate in the care of this patient; please contact us with any further questions.  I spent a total of 55 minutes, face to face, was spent with this patient, >50% of which was counseling regarding the above delineated issues.    This note was created with voice recognition software, and while reviewed for accuracy, typos may remain.     Beti Issa PA-C  Inflammatory Bowel Disease Program   Division of Gastroenterology, Hepatology and Nutrition  [facility]        IBD HISTORY  Age at diagnosis: 2021  Extent of disease: proctosigmoid   Current UC medications: None  Prior UC surgeries:  total abdominal colectomy and end ileostomy 6/15/21   Prior IBD Medications:   Infliximab, 1 dose in the hospital good response  Vedolizumab, completed induction dosing  Prednisone, minimal response to high-dose oral and IV with significant weight gain  5-ASA, no response  Topical therapies to include enemas, no response    DRUG MONITORING  TPMT enzyme activity:     6-TGN/6-MMPN levels:    Biologic concentration:       DISEASE ASSESSMENT  Endoscopic assessment:   EGD 2/14/23 ([place])  Post-op Diagnoses:        - LA Grade A reflux esophagitis.        - Erythematous mucosa in the prepyloric region of the stomach. Biopsied.        - A few fundic gland polyps.        - A single non-bleeding angioectasia in the duodenum (posterior wall).        - Biopsies were taken with a cold forceps for evaluation of celiac        disease.        - No clear cause of abdominal pain identified. No evidence of recurrent        melanoma.     Surgical Path:  Colon path 6/15/23:  FINAL DIAGNOSIS:   COLON,  RESECTION:   - Moderate chronic active colitis with crypt abscess formation ([place]   grade 3)   - Negative for dysplasia and malignancy   - Viable surgical margins   - Two benign lymph nodes (0/2)     Surgical Pathology (9/5/2023):  A. ILEUM, ILEOSTOMY TRIM;  Ileo-cutaneous anastomotic junction with nonspecific mucosa inflammation, congestion, and other features consistent with ileostomy; no dysplasia or malignancy  B. RECTUM, COMPLETION PROCTECTOMY:  Mildly active chronic proctitis with:   -Neutrophilic cryptitis, mucosal atrophy and prominent reactive lymphoid hyperplasias  -Reactive mesorectal lymph nodes;  no dysplasia or malignancy  C. COLON, ANASTOMOTIC RINGS; EXCISION:   -Portions of small intestinal mucosa and wall with no morphologic abnormalities   -Portion of rectal mucosa and wall with chronic proctitis; no dysplasia or malignancy     Enterography:   -- MRE 12/27/2022 no findings for acute inflammation of the bowel    Fecal calprotectin: --    C diff: negative 10/26/23, negative 1/12/23,  was positive 3/2021 treated with vanco taper (at [place])     HPI:   Doretha Yu is a 48 year old female with significant PMH of melanoma unknown primary (2017), s/p left axillary LN dissection (2017, Dr. Cool) in remission, history of adjuvant nivolumab induced colitis in 2018 treated with remicade (x1 at [place]) and Entyvio, h/o c.diff (recent bout, started PO vanc 4/7), seronegative RA on prednisone & tocilizumab, steroid induced DM, history of cushings, chronic pain, with diagnosis of UC in 2021, received Entyvio x2 mostly steroid dependent x3 years.  She had multiple readmissions for hematochezia, acute on chronic abdominal & rectal pain, nausea for refractory UC and concerns regarding risks associated with melanoma recurrence with using standard UC maintenance medications.  She underwent total abdominal colectomy and end ileostomy on 6/15/2021 with Dr. Ram. She is now s/p robotic completion proctectomy with  "creation of J-pouch with IPAA, takedown of end ileostomy, and diverting loop ileostomy 9/5/23. Plan for ileostomy closure 12/27/23.    Ileostomy output has been increased in surgery, however with worsening in past 3 to 4 weeks.     She was hospitalized 10/30-23-11/8/23 given new onset fever and abdominal pain and reports of extreme weakness with ongoing high ileostomy output.  CTE completed which was negative for any intra-abdominal process, fistula, abscess or bowel inflammation.  It did incidentally note pneumonia for which she was treated with 5 days of antibiotics.  Stool studies completed with normal fecal calprotectin negative enteric panel and negative C. difficile from both ostomy and J-pouch.  Adjustments made to bowel regimen including maximizing loperamide at 2 tablets 4 times daily, restarting Lomotil, starting fiber 3 times a day, and starting pantoprazole 40 mg twice daily.  She notes that she does not feel her ostomy output slowed much during the hospital stay.    She has started on outpatient IV fluids 3 times per week and electrolyte check and replacement.    After last visit she met with Shirley our dietitian regarding diet and fluid replacement.  She reports this was helpful, does report that last week was the past week she had and was snacking on small amounts throughout the day which may have slowed and thickened output some.  Reports she has run out of snacks but has grocery delivery today.  She is drinking drip drop and Pedialyte, sipping throughout the day.  Drinking at least 4 to 5 x 20-24 oz ounce water bottle.  Weight continues to trend down, 178 from 208 pounds.    Today she reports that she is no longer measuring output but continues to be similar.  Emptying ostomy bag 7-8 times per day which she estimates is 5995-7039 ml.  Notes increased output last night with needing to get up 4-5 times overnight.  Stool is still liquid, occasionally \"slushy \"after fiber for a couple hours.  No blood in " "output.  Continues to have mucus per rectum.  She reports she continues to feel dehydrated and dizzy at least once per day on days without infusions.  Does report overall infusions are helping and she feels better on these days.    When discussing bowel regimen she reports that she is currently using 8 tabs of loperamide (2 tabs 4 times a day), 2 tabs of Lomotil (1 tab twice a day), 2 packets of fiber (1 packet twice a day), and 1 packet of cholestyramine daily.  Previously has been recommended 8 tabs loperamide, 8 tabs Lomotil, 3 packets of fiber, and 2 packets cholestyramine.    She met with  yesterday.  Pouchoscopy and Gastrografin enema.  No leak and pouch normal.  Plan is to proceed with surgery 12/28/2023.    Liver enzymes last checked 11/27/2023.  Alk phos downtrend to 165, ALT down to 58, AST down to 38 (normal).  MRCP completed 12/11/23 and was normal.    Intake  Output  sIBDQ:  IBDQ Score Date IBDQ - Total Score  (Higher score better)   4/10/2018   7:20 AM 32          ROS:    Constitutional, HEENT, cardiovascular, pulmonary, GI, , musculoskeletal, neuro, skin, endocrine and psych systems are negative, except as otherwise noted.    PHYSICAL EXAMINATION:  Constitutional: aaox3, cooperative, pleasant, not dyspneic/diaphoretic, no acute distress  Vitals reviewed: Ht 1.575 m (5' 2\")   Wt 80.7 kg (178 lb)   LMP  (LMP Unknown)   BMI 32.56 kg/m    Wt:   Wt Readings from Last 2 Encounters:   12/13/23 80.7 kg (178 lb)   11/28/23 83 kg (183 lb)      General appearance: Healthy appearing adult, in no acute distress  Eyes: Sclera anicteric, Pupils round and reactive to light  Ears, nose, mouth and throat: No obvious external lesions of ears and nose, Hearing intact  Neck: Symmetric, No obvious external lesions  Respiratory: Normal respiration, no use of accessory muscles   MSK: Gait normal  Skin: No rashes or jaundice   Psychiatric: Oriented to person, place and time, Appropriate mood and affect. "       PERTINENT PAST MEDICAL HISTORY:  Past Medical History:   Diagnosis Date    Abnormal MRI     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     Anxiety     Basal cell carcinoma     Cervical high risk HPV (human papillomavirus) test positive 2019    See problem list    Colitis     Depression     Diabetes mellitus, iatrogenic (H) 2020    Esophageal reflux     Inflammatory arthritis     Insomnia     Intestinal giardiasis 2018    Lumbago     left lower back pain    Lymphedema     Malignant melanoma (H)     Melanoma (H) 10/23/2017    Migraines     Mild persistent asthma     Morbid obesity with BMI of 40.0-44.9, adult (H)     STORMY (obstructive sleep apnea)     Prothrombin deficiency (H)     takes 81mg asa daily    Stroke (cerebrum) (H)     During     TIA (transient ischemic attack)     Type 2 diabetes mellitus (H)     Ulcerative pancolitis (H)        PREVIOUS SURGERIES:  Past Surgical History:   Procedure Laterality Date    APPENDECTOMY      COLONOSCOPY N/A 10/18/2017    Procedure: COLONOSCOPY;  Colon;  Surgeon: Debbie Stephens MD;  [facility]    COLONOSCOPY N/A 2018    Procedure: COMBINED COLONOSCOPY, SINGLE OR MULTIPLE BIOPSY/POLYPECTOMY BY BIOPSY;  colon;  Surgeon: Benita Schumacher MD;  Location: [facility] GI    COLONOSCOPY      multiple since  to present - about 6 total    DAVINCI ASSISTED TRANSANAL TOTAL MESORECTAL EXCISION N/A 2023    Procedure: COMPLETION PROCTECTOMY, ROBOT-ASSISTED, ILEAL POUCH ANASTAMOSIS;  Surgeon: Quinton Ram MD;  [facility]    DISSECT LYMPH NODE AXILLA Left 10/23/2017    Procedure: DISSECT LYMPH NODE AXILLA;  Left Axillary Lymph Node Dissection ;  Surgeon: Laurent Cool MD;  [facility]    EXAM UNDER ANESTHESIA PELVIC N/A 2020    Procedure: EXAM UNDER ANESTHESIA, PELVIS; with Cervical Biopsies, Vaginal Biopsy and Endocervical Curettings;  Surgeon: Melina Jung MD;  Location: [facility] OR    GYN SURGERY   ,         LAPAROSCOPIC ASSISTED COLECTOMY N/A 06/15/2021    Procedure: laparoscopic total abdominal colectomy, end ileostomy;  Surgeon: Quinton Ram MD;  Location: [facility]    REPAIR MOHS Left 2017    Procedure: REPAIR MOHS;  Left Upper Lid Moh's Reconstruction;  Surgeon: Kisha Bosch MD;  [facility]    SIGMOIDOSCOPY FLEXIBLE N/A 2023    Procedure: Sigmoidoscopy flexible;  Surgeon: Quinton Ram MD;  [facility]: [facility]       ALLERGIES:     Allergies   Allergen Reactions    Bee Venom Swelling    Azithromycin Diarrhea    Erythromycin      Other reaction(s): GI intolerance, Vomiting    Fentanyl Other (See Comments)     sweating  sweating    Prochlorperazine Fatigue     Other reaction(s): Other (see comments)  Fatigue    Buspirone      Other reaction(s): GI intolerance  vomiting    Erythrocin Nausea and Vomiting    Gluten Meal      Celiac disease    Topamax [Topiramate]      Made her lethargic    Zithromax [Azithromycin Dihydrate] Diarrhea    Enbrel [Etanercept] Hives and Rash       PERTINENT MEDICATIONS:    Current Outpatient Medications:     acetaminophen (TYLENOL) 500 MG tablet, Take 1,000 mg by mouth every 6 hours as needed for mild pain, Disp: , Rfl:     albuterol (PROAIR HFA/PROVENTIL HFA/VENTOLIN HFA) 108 (90 Base) MCG/ACT inhaler, Inhale 2 puffs into the lungs every 4 hours as needed for shortness of breath / dyspnea, Disp: 18 g, Rfl: 1    artificial saliva (BIOTENE MT) SOLN solution, Swish and spit 2 mLs (2 sprays) in mouth 4 times daily, Disp: 44.3 mL, Rfl: 0    benzocaine-menthol (CHLORASEPTIC MAX) 15-10 MG lozenge, Place 1 lozenge inside cheek every hour as needed, Disp: 15 lozenge, Rfl: 0    cholestyramine (QUESTRAN) 4 g packet, Take 1 packet (4 g) by mouth 2 times daily (with meals), Disp: 60 packet, Rfl: 1    Cyanocobalamin (B-12 PO), Take 1 tablet by mouth daily, Disp: , Rfl:     famotidine (PEPCID) 20 MG tablet, Take 1 tablet (20 mg) by mouth 2  times daily, Disp: 60 tablet, Rfl: 0    guaiFENesin-dextromethorphan (ROBITUSSIN DM) 100-10 MG/5ML syrup, Take 10 mLs by mouth every 4 hours as needed for cough, Disp: 236 mL, Rfl: 0    hydrOXYzine (ATARAX) 25 MG tablet, Take 1 tablet (25 mg) by mouth 2 times daily, Disp: 60 tablet, Rfl: 1    magic mouthwash suspension, diphenhydrAMINE, lidocaine, aluminum-magnesium & simethicone, (FIRST-MOUTHWASH BLM) compounding kit, Swish and swallow 10 mLs in mouth every 6 hours as needed for mouth sores, Disp: 237 mL, Rfl: 0    medical cannabis (Patient's own supply), See Admin Instructions (The purpose of this order is to document that the patient reports taking medical cannabis.  This is not a prescription, and is not used to certify that the patient has a qualifying medical condition.), Disp: , Rfl:     medical cannabis (Patient's own supply), See Admin Instructions (The purpose of this order is to document that the patient reports taking medical cannabis.  This is not a prescription, and is not used to certify that the patient has a qualifying medical condition.), Disp: , Rfl:     metFORMIN (GLUCOPHAGE) 500 MG tablet, TAKE 2 TABLETS BY MOUTH 2 TIMES DAILY WITH MEALS (Patient taking differently: Take 500 mg by mouth 2 times daily (with meals) TAKE 2 TABLETS BY MOUTH 2 TIMES DAILY WITH MEALS), Disp: 360 tablet, Rfl: 1    methocarbamol (ROBAXIN) 750 MG tablet, Take 1 tablet (750 mg) by mouth 4 times daily as needed for muscle spasms, Disp: 40 tablet, Rfl: 0    ondansetron (ZOFRAN ODT) 4 MG ODT tab, Take 1 tablet (4 mg) by mouth every 8 hours as needed for nausea or vomiting, Disp: 10 tablet, Rfl: 1    Ostomy Supplies MISC, 20 each daily, Disp: 20 each, Rfl: 11    terconazole (TERAZOL 7) 0.4 % vaginal cream, Place 1 applicator vaginally at bedtime For 7 days please give the correct quantity and It says the tube is 45 grams, Disp: 15 g, Rfl: 0    triamcinolone (KENALOG) 0.1 % paste, Take by mouth 2 times daily, Disp: 5 g, Rfl:  0    venlafaxine (EFFEXOR) 75 MG tablet, TAKE TWO TABLETS BY MOUTH TWICE A DAY, Disp: 360 tablet, Rfl: 0    ARIPiprazole (ABILIFY) 2 MG tablet, Take 1 tablet (2 mg) by mouth daily for 30 days, Disp: 30 tablet, Rfl: 0    sucralfate (CARAFATE) 1 GM/10ML suspension, , Disp: , Rfl:   No current facility-administered medications for this visit.    Facility-Administered Medications Ordered in Other Visits:     lidocaine 1 % 9 mL, 9 mL, Intradermal, Once, Anna Naidu MD    sodium bicarbonate 8.4 % injection 1 mEq, 1 mEq, Intradermal, Once, Anna Naidu MD    SOCIAL HISTORY:  Social History     Socioeconomic History    Marital status:      Spouse name: Not on file    Number of children: Not on file    Years of education: Not on file    Highest education level: Not on file   Occupational History    Not on file   Tobacco Use    Smoking status: Former     Packs/day: 1.00     Years: 5.00     Additional pack years: 0.00     Total pack years: 5.00     Types: Cigarettes     Quit date: 3/20/1998     Years since quittin.7    Smokeless tobacco: Never   Vaping Use    Vaping Use: Never used   Substance and Sexual Activity    Alcohol use: Not Currently    Drug use: Yes     Types: Marijuana     Comment: vape, edible    Sexual activity: Not Currently     Partners: Male     Birth control/protection: Surgical   Other Topics Concern    Parent/sibling w/ CABG, MI or angioplasty before 65F 55M? No   Social History Narrative    20 y.o- patient's mother   of lung cancer. She had to take care of her younger sister.    2012- patient's  had a heart attack with stents placed, followed by cardiac rehabilitation    2000 TO 2012  was in [facility] for depression    2013 patient's  went through alcohol rehabilitation at [facility]            They have attended couple counseling a couple of times and patient went to the family program for  "chemical dependency.    Patient denies alcohol or drug use and herself            Has 2 children, girls ages 17 and 14. Oldest has been a \"trouble maker.\"     For a while she was working 3 jobs since her  was ill. 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 Reports her job is very stressful.         Social Determinants of Health     Financial Resource Strain: High Risk (6/28/2021)    Overall Financial Resource Strain (CARDIA)     Difficulty of Paying Living Expenses: Very hard   Food Insecurity: No Food Insecurity (6/28/2021)    Hunger Vital Sign     Worried About Running Out of Food in the Last Year: Never true     Ran Out of Food in the Last Year: Never true   Transportation Needs: No Transportation Needs (6/28/2021)    PRAPARE - Transportation     Lack of Transportation (Medical): No     Lack of Transportation (Non-Medical): No   Physical Activity: Not on file   Stress: Stress Concern Present (6/25/2021)    Citizen of [place] [employer]ational [employer] - Occupational Stress Questionnaire     Feeling of Stress : Very much   Social Connections: Unknown (6/25/2021)    Social Connection and Isolation Panel [NHANES]     Frequency of Communication with Friends and Family: Not asked     Frequency of Social Gatherings with Friends and Family: Not asked     Attends [employer]ism Services: Not asked     Active Member of Clubs or Organizations: Not asked     Attends Club or Organization Meetings: Not asked     Marital Status:    Interpersonal Safety: Low Risk  (11/28/2023)    Interpersonal Safety     Do you feel physically and emotionally safe where you currently live?: Yes     Within the past 12 months, have you been hit, slapped, kicked or otherwise physically hurt by someone?: No     Within the past 12 months, have you been humiliated or emotionally abused in other ways by your partner or ex-partner?: No   Housing Stability: Not on file       FAMILY HISTORY:  Father with history of colon polyps.  Paternal " grandmother with history of colon cancer in her 50s or 60s.  Father did also history of diverticulitis as well as IBS.  Her child has been diagnosed with IBS.  No history of Crohn's disease.  No history of ulcerative colitis.   Family History   Problem Relation Age of Onset    Cancer Mother 45        lung    Neurologic Disorder Mother         epilepsy    Lipids Father     Gastrointestinal Disease Father         diverticulitis     Depression Father     Colitis Father     Colon Cancer Father     Diverticulitis Father     Depression Sister     Cancer Maternal Grandmother     Blood Disease Maternal Grandmother         lymphoma     Arthritis Maternal Grandmother     Diabetes Maternal Grandmother     Depression Maternal Grandmother     Macular Degeneration Maternal Grandmother     Glaucoma Maternal Grandmother     Diabetes Maternal Grandfather     Cerebrovascular Disease Maternal Grandfather     Blood Disease Maternal Grandfather     Heart Disease Maternal Grandfather     Glaucoma Maternal Grandfather     Cancer Paternal Grandmother     Cancer - colorectal Paternal Grandmother     Colitis Paternal Grandmother     Colon Cancer Paternal Grandmother     Diverticulitis Paternal Grandmother     Respiratory Paternal Grandfather         emphysema     Colitis Paternal Grandfather     Colon Cancer Paternal Grandfather     Diverticulitis Paternal Grandfather     Heart Disease Daughter     Asthma Daughter     Melanoma No family hx of     Anesthesia Reaction No family hx of     Clotting Disorder No family hx of        Past/family/social history reviewed and no changes        Again, thank you for allowing me to participate in the care of your patient.      Sincerely,    Beti Issa PA-C

## 2023-12-14 ENCOUNTER — INFUSION THERAPY VISIT (OUTPATIENT)
Dept: INFUSION THERAPY | Facility: CLINIC | Age: 48
End: 2023-12-14
Attending: INTERNAL MEDICINE
Payer: COMMERCIAL

## 2023-12-14 DIAGNOSIS — F41.9 ANXIETY: ICD-10-CM

## 2023-12-14 DIAGNOSIS — Z93.2 HIGH OUTPUT ILEOSTOMY (H): Primary | ICD-10-CM

## 2023-12-14 DIAGNOSIS — K29.50 CHRONIC GASTRITIS, PRESENCE OF BLEEDING UNSPECIFIED, UNSPECIFIED GASTRITIS TYPE: ICD-10-CM

## 2023-12-14 DIAGNOSIS — R19.8 HIGH OUTPUT ILEOSTOMY (H): Primary | ICD-10-CM

## 2023-12-14 LAB — MICROSPORID STL MOD TRI STAIN: NEGATIVE

## 2023-12-14 PROCEDURE — 250N000011 HC RX IP 250 OP 636: Performed by: SURGERY

## 2023-12-14 PROCEDURE — 96361 HYDRATE IV INFUSION ADD-ON: CPT

## 2023-12-14 PROCEDURE — 258N000003 HC RX IP 258 OP 636

## 2023-12-14 PROCEDURE — 96374 THER/PROPH/DIAG INJ IV PUSH: CPT

## 2023-12-14 RX ORDER — ONDANSETRON 2 MG/ML
4 INJECTION INTRAMUSCULAR; INTRAVENOUS EVERY 6 HOURS PRN
Status: CANCELLED
Start: 2023-12-18

## 2023-12-14 RX ORDER — ALBUTEROL SULFATE 0.83 MG/ML
2.5 SOLUTION RESPIRATORY (INHALATION)
Status: CANCELLED | OUTPATIENT
Start: 2023-12-18

## 2023-12-14 RX ORDER — HEPARIN SODIUM (PORCINE) LOCK FLUSH IV SOLN 100 UNIT/ML 100 UNIT/ML
5 SOLUTION INTRAVENOUS
Status: CANCELLED | OUTPATIENT
Start: 2023-12-18

## 2023-12-14 RX ORDER — ALBUTEROL SULFATE 90 UG/1
1-2 AEROSOL, METERED RESPIRATORY (INHALATION)
Status: CANCELLED
Start: 2023-12-18

## 2023-12-14 RX ORDER — EPINEPHRINE 1 MG/ML
0.3 INJECTION, SOLUTION, CONCENTRATE INTRAVENOUS EVERY 5 MIN PRN
Status: CANCELLED | OUTPATIENT
Start: 2023-12-18

## 2023-12-14 RX ORDER — DIPHENHYDRAMINE HYDROCHLORIDE 50 MG/ML
50 INJECTION INTRAMUSCULAR; INTRAVENOUS
Status: CANCELLED
Start: 2023-12-18

## 2023-12-14 RX ORDER — HEPARIN SODIUM,PORCINE 10 UNIT/ML
5-20 VIAL (ML) INTRAVENOUS DAILY PRN
Status: CANCELLED | OUTPATIENT
Start: 2023-12-18

## 2023-12-14 RX ORDER — METHYLPREDNISOLONE SODIUM SUCCINATE 125 MG/2ML
125 INJECTION, POWDER, LYOPHILIZED, FOR SOLUTION INTRAMUSCULAR; INTRAVENOUS
Status: CANCELLED
Start: 2023-12-18

## 2023-12-14 RX ORDER — ARIPIPRAZOLE 2 MG/1
2 TABLET ORAL DAILY
Qty: 30 TABLET | Refills: 0 | Status: SHIPPED | OUTPATIENT
Start: 2023-12-14 | End: 2024-01-23

## 2023-12-14 RX ORDER — ONDANSETRON 4 MG/1
4 TABLET, ORALLY DISINTEGRATING ORAL EVERY 8 HOURS PRN
Qty: 10 TABLET | Refills: 1 | Status: SHIPPED | OUTPATIENT
Start: 2023-12-14 | End: 2023-12-19

## 2023-12-14 RX ORDER — ONDANSETRON 2 MG/ML
4 INJECTION INTRAMUSCULAR; INTRAVENOUS EVERY 6 HOURS PRN
Status: DISCONTINUED | OUTPATIENT
Start: 2023-12-14 | End: 2023-12-14 | Stop reason: HOSPADM

## 2023-12-14 RX ORDER — MEPERIDINE HYDROCHLORIDE 25 MG/ML
25 INJECTION INTRAMUSCULAR; INTRAVENOUS; SUBCUTANEOUS EVERY 30 MIN PRN
Status: CANCELLED | OUTPATIENT
Start: 2023-12-18

## 2023-12-14 RX ADMIN — ONDANSETRON 4 MG: 2 INJECTION INTRAMUSCULAR; INTRAVENOUS at 14:34

## 2023-12-14 RX ADMIN — SODIUM CHLORIDE 1000 ML: 9 INJECTION, SOLUTION INTRAVENOUS at 14:24

## 2023-12-14 NOTE — TELEPHONE ENCOUNTER
Prescription approved per [facility] Refill Protocol.  Julie Behrendt RN

## 2023-12-14 NOTE — PROGRESS NOTES
Infusion Nursing Note:  Doretha Yu presents today for IVF.    Patient seen by provider today: No   present during visit today: Not Applicable.    Note: N/A.      Intravenous Access:  Peripheral IV placed that was placed on 12/11/23 infiltrated and the pt pulled it after the MRI yesterday. New IV placed after 3 IV attempts.    Treatment Conditions:  Not Applicable.      Post Infusion Assessment:  Patient tolerated infusion without incident.        Discharge Plan:   Discharge instructions reviewed with: Patient.  Patient discharged in stable condition accompanied by: self.  Departure Mode: Ambulatory.      Tiffany Pace RN

## 2023-12-14 NOTE — TELEPHONE ENCOUNTER
Patient is asking to have this refilled by Dr. Naidu at [facility].    Thank you,    Michell Sparks  Certified Pharmacy Technician II, [facility]  Ph: [phone]  Fx: [phone]

## 2023-12-15 ENCOUNTER — TEAM CONFERENCE (OUTPATIENT)
Dept: SURGERY | Facility: CLINIC | Age: 48
End: 2023-12-15

## 2023-12-15 ENCOUNTER — OFFICE VISIT (OUTPATIENT)
Dept: SURGERY | Facility: CLINIC | Age: 48
End: 2023-12-15
Payer: COMMERCIAL

## 2023-12-15 ENCOUNTER — PATIENT OUTREACH (OUTPATIENT)
Dept: CARE COORDINATION | Facility: CLINIC | Age: 48
End: 2023-12-15

## 2023-12-15 ENCOUNTER — PRE VISIT (OUTPATIENT)
Dept: SURGERY | Facility: CLINIC | Age: 48
End: 2023-12-15

## 2023-12-15 ENCOUNTER — ANESTHESIA EVENT (OUTPATIENT)
Dept: SURGERY | Facility: CLINIC | Age: 48
DRG: 330 | End: 2023-12-15
Payer: COMMERCIAL

## 2023-12-15 ENCOUNTER — INFUSION THERAPY VISIT (OUTPATIENT)
Dept: INFUSION THERAPY | Facility: CLINIC | Age: 48
End: 2023-12-15
Attending: INTERNAL MEDICINE
Payer: COMMERCIAL

## 2023-12-15 VITALS
HEIGHT: 63 IN | SYSTOLIC BLOOD PRESSURE: 105 MMHG | TEMPERATURE: 97.9 F | DIASTOLIC BLOOD PRESSURE: 73 MMHG | OXYGEN SATURATION: 97 % | HEART RATE: 91 BPM | BODY MASS INDEX: 32.43 KG/M2 | WEIGHT: 183 LBS

## 2023-12-15 VITALS
DIASTOLIC BLOOD PRESSURE: 77 MMHG | RESPIRATION RATE: 14 BRPM | OXYGEN SATURATION: 99 % | HEART RATE: 92 BPM | SYSTOLIC BLOOD PRESSURE: 109 MMHG

## 2023-12-15 DIAGNOSIS — R19.8 HIGH OUTPUT ILEOSTOMY (H): Primary | ICD-10-CM

## 2023-12-15 DIAGNOSIS — Z93.2 ILEOSTOMY IN PLACE (H): ICD-10-CM

## 2023-12-15 DIAGNOSIS — Z93.2 HIGH OUTPUT ILEOSTOMY (H): Primary | ICD-10-CM

## 2023-12-15 DIAGNOSIS — K29.50 CHRONIC GASTRITIS, PRESENCE OF BLEEDING UNSPECIFIED, UNSPECIFIED GASTRITIS TYPE: ICD-10-CM

## 2023-12-15 DIAGNOSIS — Z01.818 PREOP EXAMINATION: Primary | ICD-10-CM

## 2023-12-15 PROCEDURE — 96374 THER/PROPH/DIAG INJ IV PUSH: CPT

## 2023-12-15 PROCEDURE — 99215 OFFICE O/P EST HI 40 MIN: CPT | Performed by: NURSE PRACTITIONER

## 2023-12-15 PROCEDURE — 258N000003 HC RX IP 258 OP 636

## 2023-12-15 PROCEDURE — 250N000011 HC RX IP 250 OP 636: Performed by: SURGERY

## 2023-12-15 PROCEDURE — 96361 HYDRATE IV INFUSION ADD-ON: CPT

## 2023-12-15 RX ORDER — HEPARIN SODIUM,PORCINE 10 UNIT/ML
5-20 VIAL (ML) INTRAVENOUS DAILY PRN
Status: CANCELLED | OUTPATIENT
Start: 2023-12-18

## 2023-12-15 RX ORDER — MEPERIDINE HYDROCHLORIDE 25 MG/ML
25 INJECTION INTRAMUSCULAR; INTRAVENOUS; SUBCUTANEOUS EVERY 30 MIN PRN
Status: CANCELLED | OUTPATIENT
Start: 2023-12-18

## 2023-12-15 RX ORDER — ALBUTEROL SULFATE 90 UG/1
1-2 AEROSOL, METERED RESPIRATORY (INHALATION)
Status: CANCELLED
Start: 2023-12-18

## 2023-12-15 RX ORDER — ALBUTEROL SULFATE 0.83 MG/ML
2.5 SOLUTION RESPIRATORY (INHALATION)
Status: CANCELLED | OUTPATIENT
Start: 2023-12-18

## 2023-12-15 RX ORDER — HEPARIN SODIUM (PORCINE) LOCK FLUSH IV SOLN 100 UNIT/ML 100 UNIT/ML
5 SOLUTION INTRAVENOUS
Status: CANCELLED | OUTPATIENT
Start: 2023-12-18

## 2023-12-15 RX ORDER — DIPHENOXYLATE HCL/ATROPINE 2.5-.025MG
2 TABLET ORAL 2 TIMES DAILY
Status: ON HOLD | COMMUNITY
Start: 2023-12-15 | End: 2023-12-31

## 2023-12-15 RX ORDER — ONDANSETRON 2 MG/ML
4 INJECTION INTRAMUSCULAR; INTRAVENOUS EVERY 6 HOURS PRN
Status: DISCONTINUED | OUTPATIENT
Start: 2023-12-15 | End: 2023-12-15 | Stop reason: HOSPADM

## 2023-12-15 RX ORDER — ONDANSETRON 4 MG/1
4 TABLET, ORALLY DISINTEGRATING ORAL EVERY 8 HOURS PRN
Qty: 10 TABLET | Refills: 1 | OUTPATIENT
Start: 2023-12-15

## 2023-12-15 RX ORDER — EPINEPHRINE 1 MG/ML
0.3 INJECTION, SOLUTION, CONCENTRATE INTRAVENOUS EVERY 5 MIN PRN
Status: CANCELLED | OUTPATIENT
Start: 2023-12-18

## 2023-12-15 RX ORDER — DIPHENHYDRAMINE HYDROCHLORIDE 50 MG/ML
50 INJECTION INTRAMUSCULAR; INTRAVENOUS
Status: CANCELLED
Start: 2023-12-18

## 2023-12-15 RX ORDER — ONDANSETRON 2 MG/ML
4 INJECTION INTRAMUSCULAR; INTRAVENOUS EVERY 6 HOURS PRN
Status: CANCELLED
Start: 2023-12-18

## 2023-12-15 RX ORDER — METHYLPREDNISOLONE SODIUM SUCCINATE 125 MG/2ML
125 INJECTION, POWDER, LYOPHILIZED, FOR SOLUTION INTRAMUSCULAR; INTRAVENOUS
Status: CANCELLED
Start: 2023-12-18

## 2023-12-15 RX ORDER — LOPERAMIDE HCL 2 MG
2 CAPSULE ORAL 4 TIMES DAILY PRN
Status: ON HOLD | COMMUNITY
End: 2023-12-31

## 2023-12-15 RX ADMIN — SODIUM CHLORIDE 1000 ML: 9 INJECTION, SOLUTION INTRAVENOUS at 13:11

## 2023-12-15 RX ADMIN — ONDANSETRON 4 MG: 2 INJECTION INTRAMUSCULAR; INTRAVENOUS at 13:11

## 2023-12-15 ASSESSMENT — LIFESTYLE VARIABLES: TOBACCO_USE: 1

## 2023-12-15 ASSESSMENT — PAIN SCALES - GENERAL
PAINLEVEL: SEVERE PAIN (6)
PAINLEVEL: SEVERE PAIN (6)

## 2023-12-15 ASSESSMENT — ENCOUNTER SYMPTOMS: ORTHOPNEA: 0

## 2023-12-15 ASSESSMENT — ACTIVITIES OF DAILY LIVING (ADL): DEPENDENT_IADLS:: INDEPENDENT

## 2023-12-15 NOTE — PROGRESS NOTES
Clinic Care Coordination Contact  [facility]/Voicemail    Clinical Data: Care Coordinator Outreach    Outreach Documentation Number of Outreach Attempt   10/27/2023  11:15 AM 1   11/29/2023  12:58 PM 1   12/15/2023  11:27 AM 1       Left message on patient's voicemail with call back information and requested return call.    Plan:. Care Coordinator will try to reach patient again in 10 business days.    [facility]   Gisel Marte RN, Care Coordinator   [facility]'s   E-mail [email or web address]   [phone]

## 2023-12-15 NOTE — PROGRESS NOTES
COLON AND RECTAL SURGERY HUDDLE:    Patient was reviewed in preparation for their surgery the following was reviewed and has been completed:    Surgeon: Dr. Quinton Ram     Surgery & Date: 12/28 ileostomy takedown      Last MD Note: reviewed    Anesthesia Type: General    Other Providers: Yes    PAC: Yes    WOC: N/A    Labs: Yes    Bowel Prep: No No Prep    Packet: No sent message to [employer]     Imaging: N/A    Post-Op Appointments: Yes    Pre op soap: Yes Questions about shower: no    Is patient on TPN?: N/A   If yes, I contacted the TPN pharmacist by paging the  pharmacy at [phone] or calling [phone]. I also contacted Lorna PARKER with inpatient colon and rectal team.     Pre op call complete: Yes

## 2023-12-15 NOTE — H&P
Pre-Operative H & P     CC:  Preoperative exam to assess for increased cardiopulmonary risk while undergoing surgery and anesthesia.    Date of Encounter: 12/15/2023  Primary Care Physician:  Anna Naidu     Reason for visit:   Encounter Diagnoses   Name Primary?    Preop examination Yes    Ileostomy in place (H)        HPI  Doretha Yu is a 48 year old female who presents for pre-operative H & P in preparation for  Procedure Information       Case: [id number] Date/Time: 12/28/23 1525    Procedure: CLOSURE, ILEOSTOMY (Abdomen)    Anesthesia type: General    Diagnosis: Ileostomy in place (H) [Z93.2]    Pre-op diagnosis: Ileostomy in place (H) [Z93.2]    Location:  OR  /  OR    Providers: Quinton Ram MD            Doretha Yu is a 48 year old female with sleep apnea, asthma, history of postpartum thromboembolic CVA (2004), chemo related neuropathy, obesity, diabetes, depression, anxiety, chemo related inflammatory arthritis, history of malignant melanoma, history of BCC and prothrombin deficiency that is s/p TAC with end ileostomy (laparoscopic) on 6/15/21 and transanal total mesorectal excision, completion proctectomy and ileal pouch anastamosis on 9/5/23 for 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  She has severely high outputs from her ileostomy and requires IVF infusions every other day due to the secondary dehydration and fatigue.  The above listed procedure has now been recommended for further management.         History is obtained from the patient and chart review    Hx of abnormal bleeding or anti-platelet use: none    Menstrual history: Patient's last menstrual period was 11/01/2023 (approximate).:      Past Medical History  Past Medical History:   Diagnosis Date    Abnormal MRI     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     Anxiety     Basal cell carcinoma     Cervical high risk HPV (human papillomavirus) test positive 12/13/2019    See problem list    Colitis      Depression     Diabetes mellitus, iatrogenic (H) 2020    Esophageal reflux     Inflammatory arthritis     Insomnia     Intestinal giardiasis 2018    Lumbago     left lower back pain    Lymphedema     Malignant melanoma (H)     Melanoma (H) 10/23/2017    Migraines     Mild persistent asthma     Morbid obesity with BMI of 40.0-44.9, adult (H)     STORMY (obstructive sleep apnea)     Prothrombin deficiency (H)     takes 81mg asa daily    Stroke (cerebrum) (H)     During     TIA (transient ischemic attack)     Type 2 diabetes mellitus (H)     Ulcerative pancolitis (H)        Past Surgical History  Past Surgical History:   Procedure Laterality Date    APPENDECTOMY      COLONOSCOPY N/A 10/18/2017    Procedure: COLONOSCOPY;  Colon;  Surgeon: Debbie Stephens MD;  [facility]    COLONOSCOPY N/A 2018    Procedure: COMBINED COLONOSCOPY, SINGLE OR MULTIPLE BIOPSY/POLYPECTOMY BY BIOPSY;  colon;  Surgeon: Benita Schumacher MD;  Location: [facility] GI    COLONOSCOPY      multiple since  to present - about 6 total    DAVINCI ASSISTED TRANSANAL TOTAL MESORECTAL EXCISION N/A 2023    Procedure: COMPLETION PROCTECTOMY, ROBOT-ASSISTED, ILEAL POUCH ANASTAMOSIS;  Surgeon: Quinton Ram MD;  [facility] OR    DISSECT LYMPH NODE AXILLA Left 10/23/2017    Procedure: DISSECT LYMPH NODE AXILLA;  Left Axillary Lymph Node Dissection ;  Surgeon: Laurent Cool MD;  [facility]: [facility] OR    EXAM UNDER ANESTHESIA PELVIC N/A 2020    Procedure: EXAM UNDER ANESTHESIA, PELVIS; with Cervical Biopsies, Vaginal Biopsy and Endocervical Curettings;  Surgeon: Melina Jung MD;  Location: [facility] OR    GYN SURGERY  ,         LAPAROSCOPIC ASSISTED COLECTOMY N/A 06/15/2021    Procedure: laparoscopic total abdominal colectomy, end ileostomy;  Surgeon: Quinton Ram MD;  [facility]: [facility] OR    REPAIR MOHS Left 2017    Procedure: REPAIR MOHS;  Left Upper Lid Moh's Reconstruction;   Surgeon: Kisha Bosch MD;  [facility]: [facility] OR    SIGMOIDOSCOPY FLEXIBLE N/A 9/5/2023    Procedure: Sigmoidoscopy flexible;  Surgeon: Quinton Ram MD;  Location: [facility] OR       Prior to Admission Medications  Current Outpatient Medications   Medication Sig Dispense Refill    acetaminophen (TYLENOL) 500 MG tablet Take 1,000 mg by mouth every 6 hours as needed for mild pain      albuterol (PROAIR HFA/PROVENTIL HFA/VENTOLIN HFA) 108 (90 Base) MCG/ACT inhaler Inhale 2 puffs into the lungs every 4 hours as needed for shortness of breath / dyspnea 18 g 1    ARIPiprazole (ABILIFY) 2 MG tablet Take 1 tablet (2 mg) by mouth daily (Patient taking differently: Take 2 mg by mouth every morning) 30 tablet 0    artificial saliva (BIOTENE MT) SOLN solution Swish and spit 2 mLs (2 sprays) in mouth 4 times daily (Patient taking differently: Swish and spit 2 sprays in mouth as needed) 44.3 mL 0    benzocaine-menthol (CHLORASEPTIC MAX) 15-10 MG lozenge Place 1 lozenge inside cheek every hour as needed 15 lozenge 0    cholestyramine (QUESTRAN) 4 g packet Take 1 packet (4 g) by mouth 2 times daily (with meals) 60 packet 1    diphenoxylate-atropine (LOMOTIL) 2.5-0.025 MG tablet Take 2 tablets by mouth 2 times daily      hydrOXYzine (ATARAX) 25 MG tablet Take 1 tablet (25 mg) by mouth 2 times daily (Patient taking differently: Take 25 mg by mouth 2 times daily as needed) 60 tablet 1    loperamide (IMODIUM) 2 MG capsule Take 2 mg by mouth 4 times daily as needed for diarrhea      magic mouthwash suspension, diphenhydrAMINE, lidocaine, aluminum-magnesium & simethicone, (FIRST-MOUTHWASH BLM) compounding kit Swish and swallow 10 mLs in mouth every 6 hours as needed for mouth sores 237 mL 0    medical cannabis (Patient's own supply) See Admin Instructions (The purpose of this order is to document that the patient reports taking medical cannabis.  This is not a prescription, and is not used to certify that the patient has a  qualifying medical condition.)      metFORMIN (GLUCOPHAGE) 500 MG tablet TAKE 2 TABLETS BY MOUTH 2 TIMES DAILY WITH MEALS (Patient taking differently: Take 500 mg by mouth 2 times daily (with meals) TAKE 2 TABLETS BY MOUTH 2 TIMES DAILY WITH MEALS) 360 tablet 1    ondansetron (ZOFRAN ODT) 4 MG ODT tab Take 1 tablet (4 mg) by mouth every 8 hours as needed for nausea or vomiting 10 tablet 1    venlafaxine (EFFEXOR) 75 MG tablet TAKE TWO TABLETS BY MOUTH TWICE A  tablet 0    Cyanocobalamin (B-12 PO) Take 1 tablet by mouth daily (Patient not taking: Reported on 12/15/2023)      medical cannabis (Patient's own supply)       methocarbamol (ROBAXIN) 750 MG tablet Take 1 tablet (750 mg) by mouth 4 times daily as needed for muscle spasms (Patient not taking: Reported on 12/15/2023) 40 tablet 0    Ostomy Supplies MISC 20 each daily 20 each 11    terconazole (TERAZOL 7) 0.4 % vaginal cream Place 1 applicator vaginally at bedtime For 7 days please give the correct quantity and It says the tube is 45 grams 15 g 0    triamcinolone (KENALOG) 0.1 % paste Take by mouth 2 times daily 5 g 0       Allergies  Allergies   Allergen Reactions    Bee Venom Swelling    Azithromycin Diarrhea    Erythromycin      Other reaction(s): GI intolerance, Vomiting    Fentanyl Other (See Comments)     sweating  sweating    Prochlorperazine Fatigue     Other reaction(s): Other (see comments)  Fatigue    Buspirone      Other reaction(s): GI intolerance  vomiting    Erythrocin Nausea and Vomiting    Gluten Meal      Celiac disease    Topamax [Topiramate]      Made her lethargic    Zithromax [Azithromycin Dihydrate] Diarrhea    Enbrel [Etanercept] Hives and Rash       Social History  Social History     Socioeconomic History    Marital status:      Spouse name: Not on file    Number of children: 2    Years of education: Not on file    Highest education level: Not on file   Occupational History    Occupation: on short term disability currently  "- is an    Tobacco Use    Smoking status: Former     Packs/day: 1.00     Years: 5.00     Additional pack years: 0.00     Total pack years: 5.00     Types: Cigarettes     Quit date: 3/20/1998     Years since quittin.7     Passive exposure: Past    Smokeless tobacco: Never   Vaping Use    Vaping Use: Never used   Substance and Sexual Activity    Alcohol use: Not Currently    Drug use: Yes     Types: Marijuana     Comment: vape, edible    Sexual activity: Not Currently     Partners: Male     Birth control/protection: Surgical   Other Topics Concern    Parent/sibling w/ CABG, MI or angioplasty before 65F 55M? No   Social History Narrative    20 y.o- patient's mother   of lung cancer. She had to take care of her younger sister.    2012- patient's  had a heart attack with stents placed, followed by cardiac rehabilitation    2000 TO 2012  was in [facility] for depression    2013 patient's  went through alcohol rehabilitation at [place] inpatient            They have attended couple counseling a couple of times and patient went to the family program for chemical dependency.    Patient denies alcohol or drug use and herself            Has 2 children, girls ages 17 and 14. Oldest has been a \"trouble maker.\"     For a while she was working 3 jobs since her  was ill. Works at the [employer] for [employer]. Reports her job is very stressful.         Social Determinants of Health     Financial Resource Strain: High Risk (2021)    Overall Financial Resource Strain (CARDIA)     Difficulty of Paying Living Expenses: Very hard   Food Insecurity: No Food Insecurity (2021)    Hunger Vital Sign     Worried About Running Out of Food in the Last Year: Never true     Ran Out of Food in the Last Year: Never true   Transportation Needs: No Transportation Needs (2021)    PRAPARE - Transportation     " Lack of Transportation (Medical): No     Lack of Transportation (Non-Medical): No   Physical Activity: Not on file   Stress: Stress Concern Present (6/25/2021)    [employer] - Occupational Stress Questionnaire     Feeling of Stress : Very much   Social Connections: Unknown (6/25/2021)    Social Connection and Isolation Panel [NHANES]     Frequency of Communication with Friends and Family: Not asked     Frequency of Social Gatherings with Friends and Family: Not asked     Attends Buddhism Services: Not asked     Active Member of Clubs or Organizations: Not asked     Attends Club or Organization Meetings: Not asked     Marital Status:    Interpersonal Safety: Low Risk  (11/28/2023)    Interpersonal Safety     Do you feel physically and emotionally safe where you currently live?: Yes     Within the past 12 months, have you been hit, slapped, kicked or otherwise physically hurt by someone?: No     Within the past 12 months, have you been humiliated or emotionally abused in other ways by your partner or ex-partner?: No   Housing Stability: Not on file       Family History  Family History   Problem Relation Age of Onset    Cancer Mother 45        lung    Neurologic Disorder Mother         epilepsy    Lipids Father     Gastrointestinal Disease Father         diverticulitis     Depression Father     Colitis Father     Colon Cancer Father     Diverticulitis Father     Depression Sister     Cancer Maternal Grandmother     Blood Disease Maternal Grandmother         lymphoma     Arthritis Maternal Grandmother     Diabetes Maternal Grandmother     Depression Maternal Grandmother     Macular Degeneration Maternal Grandmother     Glaucoma Maternal Grandmother     Diabetes Maternal Grandfather     Cerebrovascular Disease Maternal Grandfather     Blood Disease Maternal Grandfather     Heart Disease Maternal Grandfather     Glaucoma Maternal Grandfather     Cancer Paternal Grandmother     Cancer  - colorectal Paternal Grandmother     Colitis Paternal Grandmother     Colon Cancer Paternal Grandmother     Diverticulitis Paternal Grandmother     Respiratory Paternal Grandfather         emphysema     Colitis Paternal Grandfather     Colon Cancer Paternal Grandfather     Diverticulitis Paternal Grandfather     Heart Disease Daughter     Asthma Daughter     Melanoma No family hx of     Anesthesia Reaction No family hx of     Clotting Disorder No family hx of        Review of Systems  The complete review of systems is negative other than noted in the HPI or here.   Anesthesia Evaluation   Pt has had prior anesthetic.     No history of anesthetic complications       ROS/MED HX  ENT/Pulmonary:     (+) sleep apnea, doesn't use CPAP,              tobacco use, Past use,   Intermittent, asthma Last exacerbation: never,  Treatment: Inhaler prn,       recent URI, resolved, pneumonia late October 2023:        Neurologic:     (+)    peripheral neuropathy, - chemo related.     CVA, date: 2004 postpartum, with deficits, - left hemiparesis.                   Cardiovascular:     (+)  - -   -  - -           ADKINS (as above).                      Previous cardiac testing   Echo: Date: 2021 Results:  Interpretation Summary  Global and regional left ventricular function is normal with an EF of 55-60%.  Right ventricular function, chamber size, wall motion, and thickness are  normal.  The inferior vena cava is normal.  No pericardial effusion is present.  There has been no change.    Stress Test:  Date: Results:    ECG Reviewed:  Date: 11/2023 Results:  Sinus Rhythm   -Negative precordial T-waves.    WITHIN NORMAL LIMITS  Cath:  Date: Results:   (-) orthopnea/PND   METS/Exercise Tolerance: 4 - Raking leaves, gardening Comment: On good days when she has her IVF infusions she can walk a few blocks, do standing activities, clean her house and ascend a flight of stairs without exertional dyspnea or angina.    Will have exertional dyspnea  "on days that she isn't as well hydrated.     Hematologic: Comments: Thrombus related CVA in 2004 postpartum, but no prior DVT or PE    - prothrombin gene mutation/deficiency    (+) History of blood clots (hx RUE phlebitis, no hx DVT or PE),     anemia,          Musculoskeletal: Comment: Inflammatory arthritis secondary to prior chemo  (+)  arthritis,             GI/Hepatic: Comment: S/p colectomy for ulcerative pancolitis secondary to prior chemo    Ileostomy in place - high output     (+)             liver disease (fatty liver),    (-) GERD   Renal/Genitourinary: Comment: Vaginal candida - diflucan prescribed by primary care provider for 3 doses each 72 hours apart      Endo:     (+)  type II DM, Last HgA1c: 6.5, date: 10/25/23, Not using insulin, - not using insulin pump.         Obesity,       Psychiatric/Substance Use:     (+) psychiatric history anxiety and depression       Infectious Disease:  - neg infectious disease ROS     Malignancy: Comment: History of melanoma  History of BCC on left upper eyelid - resected  (+) Malignancy, History of Skin.Skin CA Remission status post Surgery and Chemo.      Other:  - neg other ROS    (+)  , H/O Chronic Pain,  (-) Any chance pregnant       /73 (BP Location: Right arm, Patient Position: Sitting, Cuff Size: Adult Regular)   Pulse 91   Temp 97.9  F (36.6  C) (Oral)   Ht 1.6 m (5' 3\")   Wt 83 kg (183 lb)   LMP 11/01/2023 (Approximate)   SpO2 97%   Breastfeeding No   BMI 32.42 kg/m      Physical Exam   Constitutional: Awake, alert, cooperative, no apparent distress.  obese  Eyes: Pupils equal, round and reactive to light, extra ocular muscles intact, sclera clear, conjunctiva normal.  HENT: Normocephalic, oral pharynx with moist mucus membranes, good dentition. No goiter appreciated.   Respiratory: Clear to auscultation bilaterally, no crackles or wheezing.  Cardiovascular: Regular rate and rhythm, normal S1 and S2, and no murmur noted.  Carotids +2, no bruits. " No edema. Palpable pulses to radial  DP and PT arteries.   GI: Normal bowel sounds, soft, non-distended, non-tender, no masses palpated, no hepatosplenomegaly.    Lymph/Hematologic: No cervical lymphadenopathy and no supraclavicular lymphadenopathy.  Genitourinary:  deferred  Skin: Warm and dry.  No rashes at anticipated surgical site.   Musculoskeletal: Full ROM of neck. There is no redness, warmth, or swelling of the exposed joints. Gross motor strength is normal.    Neurologic: Awake, alert, oriented to name, place and time. Cranial nerves II-XII are grossly intact. Gait is normal.   Neuropsychiatric: Calm, cooperative. Normal affect.     Prior Labs/Diagnostic Studies   All labs and imaging personally reviewed     EKG/ stress test - if available please see in ROS above     Component      Latest Ref Rng 11/25/2023  7:45 PM 12/11/2023  9:08 AM   WBC      4.0 - 11.0 10e3/uL 11.8 (H)     RBC Count      3.80 - 5.20 10e6/uL 4.66     Hemoglobin      11.7 - 15.7 g/dL 12.8  11.8    Hematocrit      35.0 - 47.0 % 38.8     MCV      78 - 100 fL 83     MCH      26.5 - 33.0 pg 27.5     MCHC      31.5 - 36.5 g/dL 33.0     RDW      10.0 - 15.0 % 16.7 (H)     Platelet Count      150 - 450 10e3/uL 419     % Neutrophils      % 59     % Lymphocytes      % 31     % Monocytes      % 6     % Eosinophils      % 2     % Basophils      % 1     % Immature Granulocytes      % 1     NRBCs per 100 WBC      <1 /100 0     Absolute Neutrophils      1.6 - 8.3 10e3/uL 7.0     Absolute Lymphocytes      0.8 - 5.3 10e3/uL 3.6     Absolute Monocytes      0.0 - 1.3 10e3/uL 0.7     Absolute Eosinophils      0.0 - 0.7 10e3/uL 0.2     Absolute Basophils      0.0 - 0.2 10e3/uL 0.1     Absolute Immature Granulocytes      <=0.4 10e3/uL 0.1     Absolute NRBCs      10e3/uL 0.0     Sodium      135 - 145 mmol/L  137    Potassium      3.4 - 5.3 mmol/L  4.2    Chloride      98 - 107 mmol/L  100    Carbon Dioxide (CO2)      22 - 29 mmol/L  22    Anion Gap      7 -  15 mmol/L  15    Urea Nitrogen      6.0 - 20.0 mg/dL  18.2    Creatinine      0.51 - 0.95 mg/dL  0.93    GFR Estimate      >60 mL/min/1.73m2  76    Calcium      8.6 - 10.0 mg/dL  9.8    Glucose      70 - 99 mg/dL  99    Magnesium      1.7 - 2.3 mg/dL  1.8       Legend:  (H) High        The patient's records and results personally reviewed by this provider.     Outside records reviewed from: Care Everywhere    LAB/DIAGNOSTIC STUDIES TODAY:  none    Assessment    Doretha Yu is a 48 year old female seen as a PAC referral for risk assessment and optimization for anesthesia.    Plan/Recommendations  Pt will be optimized for the proposed procedure.  See below for details on the assessment, risk, and preoperative recommendations    NEUROLOGY  - History of CVA with left hemiparesis. No significant strength difference noted on exam.     -Post Op delirium risk factors:  No risk identified    ENT  - No current airway concerns.  Will need to be reassessed day of surgery.  Mallampati: II  TM: > 3    CARDIAC  - No history of CAD, Hypertension, and Afib  - prior cardiac testing as above.    - METS (Metabolic Equivalents)  Patient performs 4 or more METS exercise without symptoms            Total Score: 0      RCRI-Low risk: Class 2 0.9% complication rate            Total Score: 1    RCRI: Cerebrovascular Disease         PULMONARY  - Obstructive Sleep Apnea  STORMY without home CPAP use.  Reports she stopped using CPAP after significant weight loss, but hasn't had repeat sleep study. Denies any sleep apnea symptoms.     - Asthma  Well controlled  - Tobacco History    History   Smoking Status    Former    Packs/day: 1.00    Years: 5.00    Types: Cigarettes    Quit date: 3/20/1998   Smokeless Tobacco    Never       GI  - denies GERD  - ileostomy with high output.  Surgery planned as above.  - getting IVF's every other day at the infusion center vis PIV due to high output.    PONV High Risk  Total Score: 3           1 AN PONV: Pt is  "Female    1 AN PONV: Patient is not a current smoker    1 AN PONV: Intended Post Op Opioids            ENDOCRINE    - BMI: Estimated body mass index is 32.42 kg/m  as calculated from the following:    Height as of this encounter: 1.6 m (5' 3\").    Weight as of this encounter: 83 kg (183 lb).  Obesity (BMI >30)  - Diabetes  Diabetes Mellitus, Type 2, non-insulin dependent.  Hold morning oral hypoglycemic medications. Recommend close monitoring of the patient's blood glucose levels throughout the perioperative period.    HEME  VTE Low Risk 0.26%            Total Score: 0      - No history of abnormal bleeding or antiplatelet use.  - Chronic anemia.  Recent hgb was 11.8.  Recommend perioperative use of blood conservation techniques intraoperatively and close monitoring for postoperative bleeding.  - history of thrombotic postpartum CVA in 2004 and RUE phlebitis, but no history of DVT or PE.  +prothrombin deficiency though.  Seen by a provider in the Bleeding and Clotting clinic earlier this week.  They noted no indication for long term anticoagulation.  Routine thrombosis prevention during surgery and post-op admission.    MSK  - inflammatory arthritis secondary to prior chemo.  Consider cautious positioning.     PSYCH  - continue mental health medication without interruption.    Access  - difficult IV access.  Has PIV in RAC now.  She notes that the infusion center places a new PIV every few days with US device and she leaves it in between visits for IVFs.    - avoid any IV access, lab draws and BPs on LUE**    Different anesthesia methods/types have been discussed with the patient, but they are aware that the final plan will be decided by the assigned anesthesia provider on the date of service.      The patient is optimized for their procedure. AVS with information on surgery time/arrival time, meds and NPO status given by nursing staff. No further diagnostic testing indicated.      On the day of service:     Prep " time: 13 minutes  Visit time: 20 minutes  Documentation time: 17 minutes  ------------------------------------------  Total time: 50 minutes      AMADA Fournier CNP  Preoperative Assessment Center  [facility]  Clinic and Surgery Center  Phone: [phone]  Fax: [phone]

## 2023-12-15 NOTE — PATIENT INSTRUCTIONS
Preparing for Your Surgery      Name:  Doretha Yu   MRN:  [id number]   :  1975   Today's Date:  12/15/2023       Arriving for surgery:  Surgery date:  2023  Arrival time:  1:30 pm    Please come to:     Please come to:      [facility] [address]  [address]  [address]      The [facility] Patient /Visitor Ramp is located at [address]. Patients and visitors who self-park will receive the reduced hospital parking rate. If the Patient /Visitor Ramp is full, please follow the signs to the  parking located at the main hospital entrance.     parking is available ( 24 hours/ 7 days a week)    Discounted parking pass options are available for patients and visitors. They can be purchased at the [employer] desk at the main hospital entrance.    -    Stop at the security desk and they will direct surgery patients to the 3rd floor Surgery Waiting Room. [phone]     -  If you are in need of directions, wheelchair or escort please stop at the Information/security desk in the lobby.       What can I eat or drink?  -  You may eat and drink normally up to 8 hours prior to arrival time. (Until 5 am)-unless otherwise directed by surgeon   -  You may have clear liquids until 2 hours prior to arrival time. (Until 11:30 am)    Examples of clear liquids:  Water  Clear broth  Juices (apple, white grape, white cranberry  and cider) without pulp  Noncarbonated, powder based beverages  (lemonade and Bg-Aid)  Sodas (Sprite, 7-Up, ginger ale and seltzer)  Coffee or tea (without milk or cream)  Gatorade    -  No Alcohol or cannabis products for at least 24 hours before surgery.     Which medicines can I take?    Hold Aspirin for 7 days before surgery.   Hold Multivitamins for 7 days before surgery.  Hold Supplements for 7 days before surgery.  Hold Ibuprofen (Advil, Motrin) for 1 day(s) before surgery--unless  otherwise directed by surgeon.  Hold Naproxen (Aleve) for 4 days before surgery.    -  DO NOT take these medications the day of surgery:  Questran  Metformin      -  PLEASE TAKE these medications the day of surgery:  Acetaminophen if needed  Inhaler per your routine and bring to hospital  Hydroxyzine if needed  Lomotil   Ondansetron if needed  Venlafaxine       How do I prepare myself?  - Please take 2 showers (one the night prior to surgery and one the morning of surgery) using Scrubcare or Hibiclens soap.    Use this soap only from the neck to your toes.     Leave the soap on your skin for one minute--then rinse thoroughly.      You may use your own shampoo and conditioner. No other hair products.   - Please remove all jewelry and body piercings.  - No lotions, deodorants or fragrance.  - No makeup or fingernail polish.   - Bring your ID and insurance card.    -If you use a CPAP machine, please bring the CPAP machine, tubing, and mask to hospital.    -If you have a Deep Brain Stimulator, Spinal Cord Stimulator, or any Neuro Stimulator device---you must bring the remote control to the hospital.      ALL PATIENTS GOING HOME THE SAME DAY OF SURGERY ARE REQUIRED TO HAVE A RESPONSIBLE ADULT TO DRIVE AND BE IN ATTENDANCE WITH THEM FOR 24 HOURS FOLLOWING SURGERY.    Covid testing policy as of 12/06/2022  Your surgeon will notify and schedule you for a COVID test if one is needed before surgery--please direct any questions or COVID symptoms to your surgeon      Questions or Concerns:    - For any questions regarding the day of surgery or your hospital stay, please contact the Pre Admission Nursing Office at [phone].       - If you have health changes between today and your surgery, please call your surgeon.       - For questions after surgery, please call your surgeons office.           Current Visitor Guidelines    You may have 2 visitors in the pre op area.    Visiting hours: 8 a.m. to 8:30 p.m.    You may have  four visitors during your inpatient hospital stay.    Patients confirmed or suspected to have symptoms of COVID 19 or flu:     No visitors allowed for adult patients.   Children (under age 18) can have 1 named visitor.     People who are sick or showing symptoms of COVID 19 or flu:    Are not allowed to visit patients--we can only make exceptions in special situations.       Please follow these guidelines for your visit:          Please maintain social distance          Masking is optional--however at times you may be asked to wear a mask for the safety of yourself and others     Clean your hands with alcohol hand . Do this when you arrive at and leave the building and patient room,    And again after you touch your mask or anything in the room.     Go directly to and from the room you are visiting.     Stay in the patient s room during your visit. Limit going to other places in the hospital as much as possible     Leave bags and jackets at home or in the car.     For everyone s health, please don t come and go during your visit. That includes for smoking   during your visit.

## 2023-12-15 NOTE — PROGRESS NOTES
Infusion Nursing Note:  Doretha Yu presents today for IVFs/ Zofran.    Patient seen by provider today: No   present during visit today: Not Applicable.    Note: N/A.      Intravenous Access:  Peripheral IV placed 12/14, patent.    Treatment Conditions:  Not Applicable.      Post Infusion Assessment:  Patient tolerated infusion without incident.  Site patent and intact, free from redness, edema or discomfort.  No evidence of extravasations.  Access discontinued per protocol.       Discharge Plan:   Patient discharged in stable condition accompanied by: self.  Departure Mode: Ambulatory.      Alice Argueta RN

## 2023-12-18 ENCOUNTER — LAB (OUTPATIENT)
Dept: INFUSION THERAPY | Facility: CLINIC | Age: 48
End: 2023-12-18
Attending: PHYSICIAN ASSISTANT
Payer: COMMERCIAL

## 2023-12-18 VITALS
OXYGEN SATURATION: 98 % | DIASTOLIC BLOOD PRESSURE: 74 MMHG | HEART RATE: 82 BPM | TEMPERATURE: 98.1 F | SYSTOLIC BLOOD PRESSURE: 111 MMHG | RESPIRATION RATE: 16 BRPM

## 2023-12-18 DIAGNOSIS — R19.8 HIGH OUTPUT ILEOSTOMY (H): Primary | ICD-10-CM

## 2023-12-18 DIAGNOSIS — C43.9 METASTATIC MALIGNANT MELANOMA (H): Primary | ICD-10-CM

## 2023-12-18 DIAGNOSIS — K52.9 COLITIS: ICD-10-CM

## 2023-12-18 DIAGNOSIS — K51.919 ULCERATIVE COLITIS WITH COMPLICATION, UNSPECIFIED LOCATION (H): ICD-10-CM

## 2023-12-18 DIAGNOSIS — Z93.2 HIGH OUTPUT ILEOSTOMY (H): Primary | ICD-10-CM

## 2023-12-18 LAB
ALBUMIN SERPL BCG-MCNC: 4.3 G/DL (ref 3.5–5.2)
ALP SERPL-CCNC: 182 U/L (ref 40–150)
ALT SERPL W P-5'-P-CCNC: 48 U/L (ref 0–50)
AST SERPL W P-5'-P-CCNC: 42 U/L (ref 0–45)
BASOPHILS # BLD AUTO: 0 10E3/UL (ref 0–0.2)
BASOPHILS NFR BLD AUTO: 0 %
BILIRUB DIRECT SERPL-MCNC: <0.2 MG/DL (ref 0–0.3)
BILIRUB SERPL-MCNC: <0.2 MG/DL
CRP SERPL-MCNC: 12.93 MG/L
EOSINOPHIL # BLD AUTO: 0.1 10E3/UL (ref 0–0.7)
EOSINOPHIL NFR BLD AUTO: 2 %
ERYTHROCYTE [DISTWIDTH] IN BLOOD BY AUTOMATED COUNT: 16.7 % (ref 10–15)
ERYTHROCYTE [SEDIMENTATION RATE] IN BLOOD BY WESTERGREN METHOD: 35 MM/HR (ref 0–20)
HCT VFR BLD AUTO: 33.5 % (ref 35–47)
HGB BLD-MCNC: 11.1 G/DL (ref 11.7–15.7)
IMM GRANULOCYTES # BLD: 0 10E3/UL
IMM GRANULOCYTES NFR BLD: 0 %
LYMPHOCYTES # BLD AUTO: 2.4 10E3/UL (ref 0.8–5.3)
LYMPHOCYTES NFR BLD AUTO: 42 %
MAGNESIUM SERPL-MCNC: 1.9 MG/DL (ref 1.7–2.3)
MCH RBC QN AUTO: 27.3 PG (ref 26.5–33)
MCHC RBC AUTO-ENTMCNC: 33.1 G/DL (ref 31.5–36.5)
MCV RBC AUTO: 82 FL (ref 78–100)
MONOCYTES # BLD AUTO: 0.5 10E3/UL (ref 0–1.3)
MONOCYTES NFR BLD AUTO: 9 %
NEUTROPHILS # BLD AUTO: 2.7 10E3/UL (ref 1.6–8.3)
NEUTROPHILS NFR BLD AUTO: 47 %
NRBC # BLD AUTO: 0 10E3/UL
NRBC BLD AUTO-RTO: 0 /100
PLATELET # BLD AUTO: 341 10E3/UL (ref 150–450)
PROT SERPL-MCNC: 7.4 G/DL (ref 6.4–8.3)
RBC # BLD AUTO: 4.07 10E6/UL (ref 3.8–5.2)
WBC # BLD AUTO: 5.8 10E3/UL (ref 4–11)

## 2023-12-18 PROCEDURE — 36415 COLL VENOUS BLD VENIPUNCTURE: CPT | Performed by: INTERNAL MEDICINE

## 2023-12-18 PROCEDURE — 85025 COMPLETE CBC W/AUTO DIFF WBC: CPT | Performed by: INTERNAL MEDICINE

## 2023-12-18 PROCEDURE — 85652 RBC SED RATE AUTOMATED: CPT | Performed by: INTERNAL MEDICINE

## 2023-12-18 PROCEDURE — 82040 ASSAY OF SERUM ALBUMIN: CPT | Performed by: INTERNAL MEDICINE

## 2023-12-18 PROCEDURE — 96374 THER/PROPH/DIAG INJ IV PUSH: CPT

## 2023-12-18 PROCEDURE — 86140 C-REACTIVE PROTEIN: CPT | Performed by: INTERNAL MEDICINE

## 2023-12-18 PROCEDURE — 96361 HYDRATE IV INFUSION ADD-ON: CPT

## 2023-12-18 PROCEDURE — 250N000011 HC RX IP 250 OP 636: Performed by: SURGERY

## 2023-12-18 PROCEDURE — 258N000003 HC RX IP 258 OP 636

## 2023-12-18 PROCEDURE — 83735 ASSAY OF MAGNESIUM: CPT | Performed by: PHYSICIAN ASSISTANT

## 2023-12-18 RX ORDER — HEPARIN SODIUM (PORCINE) LOCK FLUSH IV SOLN 100 UNIT/ML 100 UNIT/ML
5 SOLUTION INTRAVENOUS
Status: CANCELLED | OUTPATIENT
Start: 2023-12-25

## 2023-12-18 RX ORDER — ONDANSETRON 2 MG/ML
4 INJECTION INTRAMUSCULAR; INTRAVENOUS EVERY 6 HOURS PRN
Status: CANCELLED
Start: 2023-12-25

## 2023-12-18 RX ORDER — METHYLPREDNISOLONE SODIUM SUCCINATE 125 MG/2ML
125 INJECTION, POWDER, LYOPHILIZED, FOR SOLUTION INTRAMUSCULAR; INTRAVENOUS
Status: CANCELLED
Start: 2023-12-25

## 2023-12-18 RX ORDER — ALBUTEROL SULFATE 90 UG/1
1-2 AEROSOL, METERED RESPIRATORY (INHALATION)
Status: CANCELLED
Start: 2023-12-25

## 2023-12-18 RX ORDER — ONDANSETRON 2 MG/ML
4 INJECTION INTRAMUSCULAR; INTRAVENOUS EVERY 6 HOURS PRN
Status: DISCONTINUED | OUTPATIENT
Start: 2023-12-18 | End: 2023-12-18 | Stop reason: HOSPADM

## 2023-12-18 RX ORDER — DIPHENHYDRAMINE HYDROCHLORIDE 50 MG/ML
50 INJECTION INTRAMUSCULAR; INTRAVENOUS
Status: CANCELLED
Start: 2023-12-25

## 2023-12-18 RX ORDER — EPINEPHRINE 1 MG/ML
0.3 INJECTION, SOLUTION, CONCENTRATE INTRAVENOUS EVERY 5 MIN PRN
Status: CANCELLED | OUTPATIENT
Start: 2023-12-25

## 2023-12-18 RX ORDER — LIDOCAINE HYDROCHLORIDE 10 MG/ML
1 INJECTION, SOLUTION EPIDURAL; INFILTRATION; INTRACAUDAL; PERINEURAL ONCE
Status: DISCONTINUED | OUTPATIENT
Start: 2023-12-18 | End: 2023-12-18 | Stop reason: HOSPADM

## 2023-12-18 RX ORDER — HEPARIN SODIUM,PORCINE 10 UNIT/ML
5-20 VIAL (ML) INTRAVENOUS DAILY PRN
Status: CANCELLED | OUTPATIENT
Start: 2023-12-25

## 2023-12-18 RX ORDER — METHYLPREDNISOLONE SODIUM SUCCINATE 40 MG/ML
40 INJECTION, POWDER, LYOPHILIZED, FOR SOLUTION INTRAMUSCULAR; INTRAVENOUS ONCE
Status: CANCELLED
Start: 2024-01-31 | End: 2024-01-31

## 2023-12-18 RX ORDER — MEPERIDINE HYDROCHLORIDE 25 MG/ML
25 INJECTION INTRAMUSCULAR; INTRAVENOUS; SUBCUTANEOUS EVERY 30 MIN PRN
Status: CANCELLED | OUTPATIENT
Start: 2023-12-25

## 2023-12-18 RX ORDER — ALBUTEROL SULFATE 0.83 MG/ML
2.5 SOLUTION RESPIRATORY (INHALATION)
Status: CANCELLED | OUTPATIENT
Start: 2023-12-25

## 2023-12-18 RX ADMIN — SODIUM CHLORIDE 1000 ML: 9 INJECTION, SOLUTION INTRAVENOUS at 14:16

## 2023-12-18 RX ADMIN — ONDANSETRON 4 MG: 2 INJECTION INTRAMUSCULAR; INTRAVENOUS at 14:18

## 2023-12-18 NOTE — PROGRESS NOTES
PIV inserted by MW  Able to get blood draw from IV  Will leave IV in place per pt and provider wishes.

## 2023-12-18 NOTE — PROGRESS NOTES
Infusion Nursing Note:  Doretha Yu presents today for PIV labs and Generic Infusion, added Magnesium lab.    Patient seen by provider today: No   present during visit today: Not Applicable.    Note: N/A.      Intravenous Access:  Lab draw site Right hand, Needle type BBraun, Gauge 24.  Peripheral IV placed.    Treatment Conditions:  Lab Results   Component Value Date    HGB 11.1 (L) 12/18/2023    WBC 5.8 12/18/2023    ANEU 14.4 (H) 10/04/2021    ANEUTAUTO 2.7 12/18/2023     12/18/2023        Results reviewed, labs MET treatment parameters, ok to proceed with treatment.      Post Infusion Assessment:  Patient tolerated infusion without incident.  Site patent and intact, free from redness, edema or discomfort.  No evidence of extravasations.  Access PIV left in place until next visit.       Discharge Plan:   Discharge instructions reviewed with: Patient.  Patient and/or family verbalized understanding of discharge instructions and all questions answered.  Patient discharged in stable condition accompanied by: self.  Departure Mode: Ambulatory.      Shakila Vargas RN

## 2023-12-19 DIAGNOSIS — K29.50 CHRONIC GASTRITIS, PRESENCE OF BLEEDING UNSPECIFIED, UNSPECIFIED GASTRITIS TYPE: ICD-10-CM

## 2023-12-19 RX ORDER — ONDANSETRON 4 MG/1
4 TABLET, ORALLY DISINTEGRATING ORAL EVERY 8 HOURS PRN
Qty: 20 TABLET | Refills: 1 | Status: SHIPPED | OUTPATIENT
Start: 2023-12-19

## 2023-12-20 ENCOUNTER — INFUSION THERAPY VISIT (OUTPATIENT)
Dept: INFUSION THERAPY | Facility: CLINIC | Age: 48
End: 2023-12-20
Attending: INTERNAL MEDICINE
Payer: COMMERCIAL

## 2023-12-20 VITALS — DIASTOLIC BLOOD PRESSURE: 76 MMHG | RESPIRATION RATE: 16 BRPM | HEART RATE: 90 BPM | SYSTOLIC BLOOD PRESSURE: 98 MMHG

## 2023-12-20 DIAGNOSIS — R19.8 HIGH OUTPUT ILEOSTOMY (H): Primary | ICD-10-CM

## 2023-12-20 DIAGNOSIS — Z93.2 HIGH OUTPUT ILEOSTOMY (H): Primary | ICD-10-CM

## 2023-12-20 PROCEDURE — 250N000011 HC RX IP 250 OP 636: Performed by: SURGERY

## 2023-12-20 PROCEDURE — 258N000003 HC RX IP 258 OP 636

## 2023-12-20 PROCEDURE — 96361 HYDRATE IV INFUSION ADD-ON: CPT

## 2023-12-20 PROCEDURE — 96374 THER/PROPH/DIAG INJ IV PUSH: CPT

## 2023-12-20 RX ORDER — HEPARIN SODIUM (PORCINE) LOCK FLUSH IV SOLN 100 UNIT/ML 100 UNIT/ML
5 SOLUTION INTRAVENOUS
Status: CANCELLED | OUTPATIENT
Start: 2023-12-25

## 2023-12-20 RX ORDER — METHYLPREDNISOLONE SODIUM SUCCINATE 125 MG/2ML
125 INJECTION, POWDER, LYOPHILIZED, FOR SOLUTION INTRAMUSCULAR; INTRAVENOUS
Status: CANCELLED
Start: 2023-12-25

## 2023-12-20 RX ORDER — EPINEPHRINE 1 MG/ML
0.3 INJECTION, SOLUTION, CONCENTRATE INTRAVENOUS EVERY 5 MIN PRN
Status: CANCELLED | OUTPATIENT
Start: 2023-12-25

## 2023-12-20 RX ORDER — DIPHENHYDRAMINE HYDROCHLORIDE 50 MG/ML
50 INJECTION INTRAMUSCULAR; INTRAVENOUS
Status: CANCELLED
Start: 2023-12-25

## 2023-12-20 RX ORDER — ONDANSETRON 2 MG/ML
4 INJECTION INTRAMUSCULAR; INTRAVENOUS EVERY 6 HOURS PRN
Status: DISCONTINUED | OUTPATIENT
Start: 2023-12-20 | End: 2023-12-20 | Stop reason: HOSPADM

## 2023-12-20 RX ORDER — ALBUTEROL SULFATE 0.83 MG/ML
2.5 SOLUTION RESPIRATORY (INHALATION)
Status: CANCELLED | OUTPATIENT
Start: 2023-12-25

## 2023-12-20 RX ORDER — ALBUTEROL SULFATE 90 UG/1
1-2 AEROSOL, METERED RESPIRATORY (INHALATION)
Status: CANCELLED
Start: 2023-12-25

## 2023-12-20 RX ORDER — LIDOCAINE HYDROCHLORIDE 10 MG/ML
1 INJECTION, SOLUTION EPIDURAL; INFILTRATION; INTRACAUDAL; PERINEURAL ONCE
Status: DISCONTINUED | OUTPATIENT
Start: 2023-12-20 | End: 2023-12-20 | Stop reason: HOSPADM

## 2023-12-20 RX ORDER — HEPARIN SODIUM,PORCINE 10 UNIT/ML
5-20 VIAL (ML) INTRAVENOUS DAILY PRN
Status: CANCELLED | OUTPATIENT
Start: 2023-12-25

## 2023-12-20 RX ORDER — MEPERIDINE HYDROCHLORIDE 25 MG/ML
25 INJECTION INTRAMUSCULAR; INTRAVENOUS; SUBCUTANEOUS EVERY 30 MIN PRN
Status: CANCELLED | OUTPATIENT
Start: 2023-12-25

## 2023-12-20 RX ORDER — ONDANSETRON 2 MG/ML
4 INJECTION INTRAMUSCULAR; INTRAVENOUS EVERY 6 HOURS PRN
Status: CANCELLED
Start: 2023-12-25

## 2023-12-20 RX ADMIN — ONDANSETRON 4 MG: 2 INJECTION INTRAMUSCULAR; INTRAVENOUS at 13:59

## 2023-12-20 RX ADMIN — SODIUM CHLORIDE 1000 ML: 9 INJECTION, SOLUTION INTRAVENOUS at 13:58

## 2023-12-20 NOTE — PROGRESS NOTES
Infusion Nursing Note:  Doretha Yu presents today for IVF.    Patient seen by provider today: No   present during visit today: Not Applicable.    Note: Pt denies any new health concerns.      Intravenous Access:  Peripheral IV placed.    Treatment Conditions:  Not Applicable.      Post Infusion Assessment:  Patient tolerated infusion without incident.  Blood return noted pre and post infusion.  Site patent and intact, free from redness, edema or discomfort.  No evidence of extravasations.  IV SL for infusion on 12/22/23.      Discharge Plan:   Discharge instructions reviewed with: Patient.  Patient and/or family verbalized understanding of discharge instructions and all questions answered.  Patient discharged in stable condition accompanied by: self.  Departure Mode: Ambulatory.      Chelsea Rodriguez RN

## 2023-12-22 ENCOUNTER — INFUSION THERAPY VISIT (OUTPATIENT)
Dept: INFUSION THERAPY | Facility: CLINIC | Age: 48
End: 2023-12-22
Attending: INTERNAL MEDICINE
Payer: COMMERCIAL

## 2023-12-22 DIAGNOSIS — R19.8 HIGH OUTPUT ILEOSTOMY (H): Primary | ICD-10-CM

## 2023-12-22 DIAGNOSIS — Z93.2 HIGH OUTPUT ILEOSTOMY (H): Primary | ICD-10-CM

## 2023-12-22 PROCEDURE — 258N000003 HC RX IP 258 OP 636

## 2023-12-22 PROCEDURE — 96361 HYDRATE IV INFUSION ADD-ON: CPT

## 2023-12-22 PROCEDURE — 250N000011 HC RX IP 250 OP 636: Performed by: SURGERY

## 2023-12-22 PROCEDURE — 96374 THER/PROPH/DIAG INJ IV PUSH: CPT

## 2023-12-22 PROCEDURE — 250N000009 HC RX 250: Performed by: INTERNAL MEDICINE

## 2023-12-22 RX ORDER — DIPHENHYDRAMINE HYDROCHLORIDE 50 MG/ML
50 INJECTION INTRAMUSCULAR; INTRAVENOUS
Status: CANCELLED
Start: 2023-12-25

## 2023-12-22 RX ORDER — HEPARIN SODIUM,PORCINE 10 UNIT/ML
5-20 VIAL (ML) INTRAVENOUS DAILY PRN
Status: CANCELLED | OUTPATIENT
Start: 2023-12-25

## 2023-12-22 RX ORDER — ALBUTEROL SULFATE 90 UG/1
1-2 AEROSOL, METERED RESPIRATORY (INHALATION)
Status: CANCELLED
Start: 2023-12-25

## 2023-12-22 RX ORDER — MEPERIDINE HYDROCHLORIDE 25 MG/ML
25 INJECTION INTRAMUSCULAR; INTRAVENOUS; SUBCUTANEOUS EVERY 30 MIN PRN
Status: CANCELLED | OUTPATIENT
Start: 2023-12-25

## 2023-12-22 RX ORDER — EPINEPHRINE 1 MG/ML
0.3 INJECTION, SOLUTION, CONCENTRATE INTRAVENOUS EVERY 5 MIN PRN
Status: CANCELLED | OUTPATIENT
Start: 2023-12-25

## 2023-12-22 RX ORDER — ONDANSETRON 2 MG/ML
4 INJECTION INTRAMUSCULAR; INTRAVENOUS EVERY 6 HOURS PRN
Status: CANCELLED
Start: 2023-12-25

## 2023-12-22 RX ORDER — LIDOCAINE HYDROCHLORIDE 10 MG/ML
1 INJECTION, SOLUTION EPIDURAL; INFILTRATION; INTRACAUDAL; PERINEURAL ONCE
Status: COMPLETED | OUTPATIENT
Start: 2023-12-22 | End: 2023-12-22

## 2023-12-22 RX ORDER — HEPARIN SODIUM (PORCINE) LOCK FLUSH IV SOLN 100 UNIT/ML 100 UNIT/ML
5 SOLUTION INTRAVENOUS
Status: CANCELLED | OUTPATIENT
Start: 2023-12-25

## 2023-12-22 RX ORDER — METHYLPREDNISOLONE SODIUM SUCCINATE 125 MG/2ML
125 INJECTION, POWDER, LYOPHILIZED, FOR SOLUTION INTRAMUSCULAR; INTRAVENOUS
Status: CANCELLED
Start: 2023-12-25

## 2023-12-22 RX ORDER — ONDANSETRON 2 MG/ML
4 INJECTION INTRAMUSCULAR; INTRAVENOUS EVERY 6 HOURS PRN
Status: DISCONTINUED | OUTPATIENT
Start: 2023-12-22 | End: 2023-12-22 | Stop reason: HOSPADM

## 2023-12-22 RX ORDER — ALBUTEROL SULFATE 0.83 MG/ML
2.5 SOLUTION RESPIRATORY (INHALATION)
Status: CANCELLED | OUTPATIENT
Start: 2023-12-25

## 2023-12-22 RX ADMIN — ONDANSETRON 4 MG: 2 INJECTION INTRAMUSCULAR; INTRAVENOUS at 13:46

## 2023-12-22 RX ADMIN — SODIUM CHLORIDE 1000 ML: 9 INJECTION, SOLUTION INTRAVENOUS at 13:46

## 2023-12-22 RX ADMIN — LIDOCAINE HYDROCHLORIDE 1 ML: 10 INJECTION, SOLUTION EPIDURAL; INFILTRATION; INTRACAUDAL; PERINEURAL at 13:46

## 2023-12-22 NOTE — PROGRESS NOTES
Infusion Nursing Note:  Doretha Yu presents today for IVF.    Patient seen by provider today: No   present during visit today: Not Applicable.    Note: Pt denies any new health changes or concerns.      Intravenous Access:  Peripheral IV placed.    Treatment Conditions:  Not Applicable.      Post Infusion Assessment:  Patient tolerated infusion without incident.  Blood return noted pre and post infusion.  Site patent and intact, free from redness, edema or discomfort.  No evidence of extravasations.  Access discontinued per protocol.       Discharge Plan:   Discharge instructions reviewed with: Patient.  Patient and/or family verbalized understanding of discharge instructions and all questions answered.  Patient discharged in stable condition accompanied by: self.  Departure Mode: Ambulatory.      Chelsea Rodriguez RN

## 2023-12-26 ENCOUNTER — INFUSION THERAPY VISIT (OUTPATIENT)
Dept: INFUSION THERAPY | Facility: CLINIC | Age: 48
End: 2023-12-26
Attending: INTERNAL MEDICINE
Payer: COMMERCIAL

## 2023-12-26 DIAGNOSIS — R19.8 HIGH OUTPUT ILEOSTOMY (H): Primary | ICD-10-CM

## 2023-12-26 DIAGNOSIS — Z93.2 HIGH OUTPUT ILEOSTOMY (H): Primary | ICD-10-CM

## 2023-12-26 PROCEDURE — 250N000011 HC RX IP 250 OP 636: Performed by: SURGERY

## 2023-12-26 PROCEDURE — 96361 HYDRATE IV INFUSION ADD-ON: CPT

## 2023-12-26 PROCEDURE — 96374 THER/PROPH/DIAG INJ IV PUSH: CPT

## 2023-12-26 PROCEDURE — 258N000003 HC RX IP 258 OP 636

## 2023-12-26 RX ORDER — HEPARIN SODIUM (PORCINE) LOCK FLUSH IV SOLN 100 UNIT/ML 100 UNIT/ML
5 SOLUTION INTRAVENOUS
OUTPATIENT
Start: 2024-01-01

## 2023-12-26 RX ORDER — ONDANSETRON 2 MG/ML
4 INJECTION INTRAMUSCULAR; INTRAVENOUS EVERY 6 HOURS PRN
Status: DISCONTINUED | OUTPATIENT
Start: 2023-12-26 | End: 2023-12-26 | Stop reason: HOSPADM

## 2023-12-26 RX ORDER — EPINEPHRINE 1 MG/ML
0.3 INJECTION, SOLUTION, CONCENTRATE INTRAVENOUS EVERY 5 MIN PRN
OUTPATIENT
Start: 2024-01-01

## 2023-12-26 RX ORDER — ALBUTEROL SULFATE 0.83 MG/ML
2.5 SOLUTION RESPIRATORY (INHALATION)
OUTPATIENT
Start: 2024-01-01

## 2023-12-26 RX ORDER — DIPHENHYDRAMINE HYDROCHLORIDE 50 MG/ML
50 INJECTION INTRAMUSCULAR; INTRAVENOUS
Start: 2024-01-01

## 2023-12-26 RX ORDER — ALBUTEROL SULFATE 90 UG/1
1-2 AEROSOL, METERED RESPIRATORY (INHALATION)
Start: 2024-01-01

## 2023-12-26 RX ORDER — MEPERIDINE HYDROCHLORIDE 25 MG/ML
25 INJECTION INTRAMUSCULAR; INTRAVENOUS; SUBCUTANEOUS EVERY 30 MIN PRN
OUTPATIENT
Start: 2024-01-01

## 2023-12-26 RX ORDER — ONDANSETRON 2 MG/ML
4 INJECTION INTRAMUSCULAR; INTRAVENOUS EVERY 6 HOURS PRN
Start: 2024-01-01

## 2023-12-26 RX ORDER — METHYLPREDNISOLONE SODIUM SUCCINATE 125 MG/2ML
125 INJECTION, POWDER, LYOPHILIZED, FOR SOLUTION INTRAMUSCULAR; INTRAVENOUS
Start: 2024-01-01

## 2023-12-26 RX ORDER — LIDOCAINE HYDROCHLORIDE 10 MG/ML
1 INJECTION, SOLUTION EPIDURAL; INFILTRATION; INTRACAUDAL; PERINEURAL ONCE
Status: DISCONTINUED | OUTPATIENT
Start: 2023-12-26 | End: 2023-12-26 | Stop reason: HOSPADM

## 2023-12-26 RX ORDER — HEPARIN SODIUM,PORCINE 10 UNIT/ML
5-20 VIAL (ML) INTRAVENOUS DAILY PRN
OUTPATIENT
Start: 2024-01-01

## 2023-12-26 RX ADMIN — SODIUM CHLORIDE 1000 ML: 9 INJECTION, SOLUTION INTRAVENOUS at 08:29

## 2023-12-26 RX ADMIN — ONDANSETRON 4 MG: 2 INJECTION INTRAMUSCULAR; INTRAVENOUS at 08:33

## 2023-12-26 NOTE — PROGRESS NOTES
Infusion Nursing Note:  Doretha Yu presents today for IVF.    Patient seen by provider today: No   present during visit today: Not Applicable.    Note: Pt denies any new health changes or cocnerns.      Intravenous Access:  Peripheral IV placed.    Treatment Conditions:  Not Applicable.      Post Infusion Assessment:  Patient tolerated infusion without incident.  Blood return noted pre and post infusion.  Site patent and intact, free from redness, edema or discomfort.  No evidence of extravasations.  Access discontinued per protocol.       Discharge Plan:   Discharge instructions reviewed with: Patient.  Patient and/or family verbalized understanding of discharge instructions and all questions answered.  Patient discharged in stable condition accompanied by: self.  Departure Mode: Ambulatory.      Chelsea Rodrgiuez RN

## 2023-12-27 ENCOUNTER — TELEPHONE (OUTPATIENT)
Dept: SURGERY | Facility: CLINIC | Age: 48
End: 2023-12-27
Payer: COMMERCIAL

## 2023-12-27 NOTE — TELEPHONE ENCOUNTER
On 12/27/2023 at 11:42 AM:  Confirmed with patient: new arrival time for surgery tomorrow 11:45 AM, new surgery start time 1:45 PM.    Cathy Terrazas  Misti-op Coordinator  [place]  Direct Phone: [phone]

## 2023-12-28 ENCOUNTER — ANESTHESIA (OUTPATIENT)
Dept: SURGERY | Facility: CLINIC | Age: 48
DRG: 330 | End: 2023-12-28
Payer: COMMERCIAL

## 2023-12-28 ENCOUNTER — HOSPITAL ENCOUNTER (INPATIENT)
Facility: CLINIC | Age: 48
LOS: 3 days | Discharge: HOME | DRG: 330 | End: 2023-12-31
Attending: SURGERY | Admitting: SURGERY
Payer: COMMERCIAL

## 2023-12-28 DIAGNOSIS — G89.18 ACUTE POST-OPERATIVE PAIN: ICD-10-CM

## 2023-12-28 DIAGNOSIS — Z93.2 ILEOSTOMY IN PLACE (H): Primary | ICD-10-CM

## 2023-12-28 LAB
GLUCOSE BLDC GLUCOMTR-MCNC: 105 MG/DL (ref 70–99)
GLUCOSE BLDC GLUCOMTR-MCNC: 99 MG/DL (ref 70–99)

## 2023-12-28 PROCEDURE — 88304 TISSUE EXAM BY PATHOLOGIST: CPT | Mod: TC | Performed by: SURGERY

## 2023-12-28 PROCEDURE — 88304 TISSUE EXAM BY PATHOLOGIST: CPT | Mod: 26 | Performed by: PATHOLOGY

## 2023-12-28 PROCEDURE — 0DBB0ZZ EXCISION OF ILEUM, OPEN APPROACH: ICD-10-PCS | Performed by: SURGERY

## 2023-12-28 PROCEDURE — 258N000003 HC RX IP 258 OP 636: Performed by: NURSE ANESTHETIST, CERTIFIED REGISTERED

## 2023-12-28 PROCEDURE — 250N000009 HC RX 250: Performed by: STUDENT IN AN ORGANIZED HEALTH CARE EDUCATION/TRAINING PROGRAM

## 2023-12-28 PROCEDURE — 250N000011 HC RX IP 250 OP 636: Performed by: NURSE ANESTHETIST, CERTIFIED REGISTERED

## 2023-12-28 PROCEDURE — 258N000003 HC RX IP 258 OP 636: Performed by: STUDENT IN AN ORGANIZED HEALTH CARE EDUCATION/TRAINING PROGRAM

## 2023-12-28 PROCEDURE — 250N000013 HC RX MED GY IP 250 OP 250 PS 637

## 2023-12-28 PROCEDURE — 272N000001 HC OR GENERAL SUPPLY STERILE: Performed by: SURGERY

## 2023-12-28 PROCEDURE — 120N000002 HC R&B MED SURG/OB UMMC

## 2023-12-28 PROCEDURE — 250N000011 HC RX IP 250 OP 636: Performed by: ANESTHESIOLOGY

## 2023-12-28 PROCEDURE — 250N000011 HC RX IP 250 OP 636: Performed by: SURGERY

## 2023-12-28 PROCEDURE — 250N000025 HC SEVOFLURANE, PER MIN: Performed by: SURGERY

## 2023-12-28 PROCEDURE — 370N000017 HC ANESTHESIA TECHNICAL FEE, PER MIN: Performed by: SURGERY

## 2023-12-28 PROCEDURE — 250N000013 HC RX MED GY IP 250 OP 250 PS 637: Performed by: SURGERY

## 2023-12-28 PROCEDURE — 250N000011 HC RX IP 250 OP 636: Performed by: STUDENT IN AN ORGANIZED HEALTH CARE EDUCATION/TRAINING PROGRAM

## 2023-12-28 PROCEDURE — 999N000141 HC STATISTIC PRE-PROCEDURE NURSING ASSESSMENT: Performed by: SURGERY

## 2023-12-28 PROCEDURE — 360N000077 HC SURGERY LEVEL 4, PER MIN: Performed by: SURGERY

## 2023-12-28 PROCEDURE — 250N000009 HC RX 250: Performed by: NURSE ANESTHETIST, CERTIFIED REGISTERED

## 2023-12-28 PROCEDURE — 258N000003 HC RX IP 258 OP 636: Performed by: SURGERY

## 2023-12-28 PROCEDURE — 710N000010 HC RECOVERY PHASE 1, LEVEL 2, PER MIN: Performed by: SURGERY

## 2023-12-28 PROCEDURE — 250N000011 HC RX IP 250 OP 636: Mod: JZ | Performed by: SURGERY

## 2023-12-28 PROCEDURE — 44625 REPAIR BOWEL OPENING: CPT | Performed by: SURGERY

## 2023-12-28 RX ORDER — BUPIVACAINE HYDROCHLORIDE 2.5 MG/ML
INJECTION, SOLUTION EPIDURAL; INFILTRATION; INTRACAUDAL PRN
Status: DISCONTINUED | OUTPATIENT
Start: 2023-12-28 | End: 2023-12-28 | Stop reason: HOSPADM

## 2023-12-28 RX ORDER — LABETALOL HYDROCHLORIDE 5 MG/ML
10 INJECTION, SOLUTION INTRAVENOUS
Status: DISCONTINUED | OUTPATIENT
Start: 2023-12-28 | End: 2023-12-28 | Stop reason: HOSPADM

## 2023-12-28 RX ORDER — HYDROMORPHONE HCL IN WATER/PF 6 MG/30 ML
0.4 PATIENT CONTROLLED ANALGESIA SYRINGE INTRAVENOUS
Status: DISCONTINUED | OUTPATIENT
Start: 2023-12-28 | End: 2023-12-31

## 2023-12-28 RX ORDER — ONDANSETRON 2 MG/ML
4 INJECTION INTRAMUSCULAR; INTRAVENOUS ONCE
Status: COMPLETED | OUTPATIENT
Start: 2023-12-28 | End: 2023-12-28

## 2023-12-28 RX ORDER — ONDANSETRON 4 MG/1
4 TABLET, ORALLY DISINTEGRATING ORAL EVERY 30 MIN PRN
Status: DISCONTINUED | OUTPATIENT
Start: 2023-12-28 | End: 2023-12-28 | Stop reason: HOSPADM

## 2023-12-28 RX ORDER — HYDROMORPHONE HCL IN WATER/PF 6 MG/30 ML
0.2 PATIENT CONTROLLED ANALGESIA SYRINGE INTRAVENOUS
Status: DISCONTINUED | OUTPATIENT
Start: 2023-12-28 | End: 2023-12-31

## 2023-12-28 RX ORDER — HYDROXYZINE HYDROCHLORIDE 25 MG/1
25 TABLET, FILM COATED ORAL 2 TIMES DAILY PRN
Status: DISCONTINUED | OUTPATIENT
Start: 2023-12-28 | End: 2023-12-28

## 2023-12-28 RX ORDER — CEFAZOLIN SODIUM/WATER 2 G/20 ML
2 SYRINGE (ML) INTRAVENOUS
Status: DISCONTINUED | OUTPATIENT
Start: 2023-12-28 | End: 2023-12-28 | Stop reason: HOSPADM

## 2023-12-28 RX ORDER — SODIUM CHLORIDE, SODIUM LACTATE, POTASSIUM CHLORIDE, CALCIUM CHLORIDE 600; 310; 30; 20 MG/100ML; MG/100ML; MG/100ML; MG/100ML
INJECTION, SOLUTION INTRAVENOUS CONTINUOUS
Status: DISCONTINUED | OUTPATIENT
Start: 2023-12-28 | End: 2023-12-28 | Stop reason: HOSPADM

## 2023-12-28 RX ORDER — CYCLOBENZAPRINE HCL 10 MG
10 TABLET ORAL EVERY 8 HOURS PRN
Status: DISCONTINUED | OUTPATIENT
Start: 2023-12-28 | End: 2023-12-30

## 2023-12-28 RX ORDER — NALOXONE HYDROCHLORIDE 0.4 MG/ML
0.4 INJECTION, SOLUTION INTRAMUSCULAR; INTRAVENOUS; SUBCUTANEOUS
Status: DISCONTINUED | OUTPATIENT
Start: 2023-12-28 | End: 2023-12-31 | Stop reason: HOSPADM

## 2023-12-28 RX ORDER — DEXTROSE MONOHYDRATE 25 G/50ML
25-50 INJECTION, SOLUTION INTRAVENOUS
Status: DISCONTINUED | OUTPATIENT
Start: 2023-12-28 | End: 2023-12-31 | Stop reason: HOSPADM

## 2023-12-28 RX ORDER — ONDANSETRON 4 MG/1
4 TABLET, ORALLY DISINTEGRATING ORAL EVERY 6 HOURS PRN
Status: DISCONTINUED | OUTPATIENT
Start: 2023-12-28 | End: 2023-12-31 | Stop reason: HOSPADM

## 2023-12-28 RX ORDER — FENTANYL CITRATE 50 UG/ML
INJECTION, SOLUTION INTRAMUSCULAR; INTRAVENOUS PRN
Status: DISCONTINUED | OUTPATIENT
Start: 2023-12-28 | End: 2023-12-28

## 2023-12-28 RX ORDER — CEFAZOLIN SODIUM/WATER 2 G/20 ML
2 SYRINGE (ML) INTRAVENOUS SEE ADMIN INSTRUCTIONS
Status: DISCONTINUED | OUTPATIENT
Start: 2023-12-28 | End: 2023-12-28 | Stop reason: HOSPADM

## 2023-12-28 RX ORDER — ACETAMINOPHEN 325 MG/1
975 TABLET ORAL 3 TIMES DAILY
Status: DISCONTINUED | OUTPATIENT
Start: 2023-12-28 | End: 2023-12-31 | Stop reason: HOSPADM

## 2023-12-28 RX ORDER — NICOTINE POLACRILEX 4 MG
15-30 LOZENGE BUCCAL
Status: DISCONTINUED | OUTPATIENT
Start: 2023-12-28 | End: 2023-12-31 | Stop reason: HOSPADM

## 2023-12-28 RX ORDER — PROPOFOL 10 MG/ML
INJECTION, EMULSION INTRAVENOUS PRN
Status: DISCONTINUED | OUTPATIENT
Start: 2023-12-28 | End: 2023-12-28

## 2023-12-28 RX ORDER — ONDANSETRON 2 MG/ML
4 INJECTION INTRAMUSCULAR; INTRAVENOUS EVERY 30 MIN PRN
Status: DISCONTINUED | OUTPATIENT
Start: 2023-12-28 | End: 2023-12-28 | Stop reason: HOSPADM

## 2023-12-28 RX ORDER — OXYCODONE HYDROCHLORIDE 10 MG/1
10 TABLET ORAL
Status: CANCELLED | OUTPATIENT
Start: 2023-12-28

## 2023-12-28 RX ORDER — ACETAMINOPHEN 325 MG/1
975 TABLET ORAL ONCE
Status: COMPLETED | OUTPATIENT
Start: 2023-12-28 | End: 2023-12-28

## 2023-12-28 RX ORDER — NALOXONE HYDROCHLORIDE 0.4 MG/ML
0.2 INJECTION, SOLUTION INTRAMUSCULAR; INTRAVENOUS; SUBCUTANEOUS
Status: DISCONTINUED | OUTPATIENT
Start: 2023-12-28 | End: 2023-12-31 | Stop reason: HOSPADM

## 2023-12-28 RX ORDER — ONDANSETRON 2 MG/ML
4 INJECTION INTRAMUSCULAR; INTRAVENOUS EVERY 6 HOURS PRN
Status: DISCONTINUED | OUTPATIENT
Start: 2023-12-28 | End: 2023-12-31 | Stop reason: HOSPADM

## 2023-12-28 RX ORDER — ONDANSETRON 2 MG/ML
4 INJECTION INTRAMUSCULAR; INTRAVENOUS EVERY 30 MIN PRN
Status: CANCELLED | OUTPATIENT
Start: 2023-12-28

## 2023-12-28 RX ORDER — HYDROXYZINE HYDROCHLORIDE 25 MG/1
25 TABLET, FILM COATED ORAL 2 TIMES DAILY PRN
Status: DISCONTINUED | OUTPATIENT
Start: 2023-12-28 | End: 2023-12-31 | Stop reason: HOSPADM

## 2023-12-28 RX ORDER — LIDOCAINE 40 MG/G
CREAM TOPICAL
Status: DISCONTINUED | OUTPATIENT
Start: 2023-12-28 | End: 2023-12-31 | Stop reason: HOSPADM

## 2023-12-28 RX ORDER — ARIPIPRAZOLE 2 MG/1
2 TABLET ORAL EVERY MORNING
Status: DISCONTINUED | OUTPATIENT
Start: 2023-12-29 | End: 2023-12-31 | Stop reason: HOSPADM

## 2023-12-28 RX ORDER — LIDOCAINE 40 MG/G
CREAM TOPICAL
Status: DISCONTINUED | OUTPATIENT
Start: 2023-12-28 | End: 2023-12-28 | Stop reason: HOSPADM

## 2023-12-28 RX ORDER — LIDOCAINE HYDROCHLORIDE 20 MG/ML
INJECTION, SOLUTION INFILTRATION; PERINEURAL PRN
Status: DISCONTINUED | OUTPATIENT
Start: 2023-12-28 | End: 2023-12-28

## 2023-12-28 RX ORDER — HYDROMORPHONE HCL IN WATER/PF 6 MG/30 ML
0.2 PATIENT CONTROLLED ANALGESIA SYRINGE INTRAVENOUS EVERY 5 MIN PRN
Status: DISCONTINUED | OUTPATIENT
Start: 2023-12-28 | End: 2023-12-28 | Stop reason: HOSPADM

## 2023-12-28 RX ORDER — HYDROMORPHONE HCL IN WATER/PF 6 MG/30 ML
0.4 PATIENT CONTROLLED ANALGESIA SYRINGE INTRAVENOUS EVERY 5 MIN PRN
Status: DISCONTINUED | OUTPATIENT
Start: 2023-12-28 | End: 2023-12-28 | Stop reason: HOSPADM

## 2023-12-28 RX ORDER — OXYCODONE HYDROCHLORIDE 5 MG/1
5 TABLET ORAL
Status: CANCELLED | OUTPATIENT
Start: 2023-12-28

## 2023-12-28 RX ORDER — ONDANSETRON 4 MG/1
4 TABLET, ORALLY DISINTEGRATING ORAL EVERY 30 MIN PRN
Status: CANCELLED | OUTPATIENT
Start: 2023-12-28

## 2023-12-28 RX ORDER — OXYCODONE HYDROCHLORIDE 5 MG/1
5 TABLET ORAL EVERY 4 HOURS PRN
Status: DISCONTINUED | OUTPATIENT
Start: 2023-12-28 | End: 2023-12-31 | Stop reason: HOSPADM

## 2023-12-28 RX ORDER — ENOXAPARIN SODIUM 100 MG/ML
40 INJECTION SUBCUTANEOUS
Status: COMPLETED | OUTPATIENT
Start: 2023-12-28 | End: 2023-12-28

## 2023-12-28 RX ORDER — SODIUM CHLORIDE, SODIUM LACTATE, POTASSIUM CHLORIDE, CALCIUM CHLORIDE 600; 310; 30; 20 MG/100ML; MG/100ML; MG/100ML; MG/100ML
INJECTION, SOLUTION INTRAVENOUS CONTINUOUS
Status: DISCONTINUED | OUTPATIENT
Start: 2023-12-28 | End: 2023-12-30

## 2023-12-28 RX ORDER — METRONIDAZOLE 500 MG/100ML
500 INJECTION, SOLUTION INTRAVENOUS
Status: COMPLETED | OUTPATIENT
Start: 2023-12-28 | End: 2023-12-28

## 2023-12-28 RX ORDER — METHOCARBAMOL 500 MG/1
500 TABLET, FILM COATED ORAL 4 TIMES DAILY PRN
Status: DISCONTINUED | OUTPATIENT
Start: 2023-12-28 | End: 2023-12-31 | Stop reason: HOSPADM

## 2023-12-28 RX ORDER — ENOXAPARIN SODIUM 100 MG/ML
40 INJECTION SUBCUTANEOUS EVERY 24 HOURS
Status: DISCONTINUED | OUTPATIENT
Start: 2023-12-29 | End: 2023-12-31 | Stop reason: HOSPADM

## 2023-12-28 RX ORDER — VENLAFAXINE 75 MG/1
150 TABLET ORAL 2 TIMES DAILY
Status: DISCONTINUED | OUTPATIENT
Start: 2023-12-28 | End: 2023-12-31 | Stop reason: HOSPADM

## 2023-12-28 RX ORDER — SODIUM CHLORIDE, SODIUM LACTATE, POTASSIUM CHLORIDE, CALCIUM CHLORIDE 600; 310; 30; 20 MG/100ML; MG/100ML; MG/100ML; MG/100ML
INJECTION, SOLUTION INTRAVENOUS CONTINUOUS PRN
Status: DISCONTINUED | OUTPATIENT
Start: 2023-12-28 | End: 2023-12-28

## 2023-12-28 RX ORDER — CHOLESTYRAMINE 4 G/9G
1 POWDER, FOR SUSPENSION ORAL 2 TIMES DAILY WITH MEALS
Status: DISCONTINUED | OUTPATIENT
Start: 2023-12-29 | End: 2023-12-31 | Stop reason: HOSPADM

## 2023-12-28 RX ORDER — OXYCODONE HYDROCHLORIDE 10 MG/1
10 TABLET ORAL EVERY 4 HOURS PRN
Status: DISCONTINUED | OUTPATIENT
Start: 2023-12-28 | End: 2023-12-31 | Stop reason: HOSPADM

## 2023-12-28 RX ADMIN — Medication 2 G: at 13:53

## 2023-12-28 RX ADMIN — VENLAFAXINE 150 MG: 75 TABLET ORAL at 22:33

## 2023-12-28 RX ADMIN — LIDOCAINE HYDROCHLORIDE 100 MG: 20 INJECTION, SOLUTION INFILTRATION; PERINEURAL at 13:41

## 2023-12-28 RX ADMIN — HYDROMORPHONE HYDROCHLORIDE 0.4 MG: 0.2 INJECTION, SOLUTION INTRAMUSCULAR; INTRAVENOUS; SUBCUTANEOUS at 20:31

## 2023-12-28 RX ADMIN — ENOXAPARIN SODIUM 40 MG: 40 INJECTION SUBCUTANEOUS at 13:04

## 2023-12-28 RX ADMIN — HYDROMORPHONE HYDROCHLORIDE 0.4 MG: 0.2 INJECTION, SOLUTION INTRAMUSCULAR; INTRAVENOUS; SUBCUTANEOUS at 16:34

## 2023-12-28 RX ADMIN — Medication 20 MG: at 14:36

## 2023-12-28 RX ADMIN — SODIUM CHLORIDE, POTASSIUM CHLORIDE, SODIUM LACTATE AND CALCIUM CHLORIDE: 600; 310; 30; 20 INJECTION, SOLUTION INTRAVENOUS at 15:38

## 2023-12-28 RX ADMIN — PROPOFOL 150 MG: 10 INJECTION, EMULSION INTRAVENOUS at 13:41

## 2023-12-28 RX ADMIN — METHOCARBAMOL 500 MG: 500 TABLET ORAL at 19:57

## 2023-12-28 RX ADMIN — FENTANYL CITRATE 50 MCG: 50 INJECTION INTRAMUSCULAR; INTRAVENOUS at 14:01

## 2023-12-28 RX ADMIN — ONDANSETRON 4 MG: 2 INJECTION INTRAMUSCULAR; INTRAVENOUS at 13:31

## 2023-12-28 RX ADMIN — HYDROMORPHONE HYDROCHLORIDE 0.4 MG: 0.2 INJECTION, SOLUTION INTRAMUSCULAR; INTRAVENOUS; SUBCUTANEOUS at 23:59

## 2023-12-28 RX ADMIN — FENTANYL CITRATE 50 MCG: 50 INJECTION INTRAMUSCULAR; INTRAVENOUS at 14:57

## 2023-12-28 RX ADMIN — ONDANSETRON 4 MG: 2 INJECTION INTRAMUSCULAR; INTRAVENOUS at 22:20

## 2023-12-28 RX ADMIN — DEXMEDETOMIDINE HYDROCHLORIDE 8 MCG: 100 INJECTION, SOLUTION INTRAVENOUS at 16:37

## 2023-12-28 RX ADMIN — HYDROMORPHONE HYDROCHLORIDE 0.5 MG: 1 INJECTION, SOLUTION INTRAMUSCULAR; INTRAVENOUS; SUBCUTANEOUS at 15:06

## 2023-12-28 RX ADMIN — HYDROMORPHONE HYDROCHLORIDE 0.4 MG: 0.2 INJECTION, SOLUTION INTRAMUSCULAR; INTRAVENOUS; SUBCUTANEOUS at 16:22

## 2023-12-28 RX ADMIN — MIDAZOLAM 2 MG: 1 INJECTION INTRAMUSCULAR; INTRAVENOUS at 13:26

## 2023-12-28 RX ADMIN — ACETAMINOPHEN 975 MG: 325 TABLET, FILM COATED ORAL at 19:57

## 2023-12-28 RX ADMIN — HYDROMORPHONE HYDROCHLORIDE 0.4 MG: 0.2 INJECTION, SOLUTION INTRAMUSCULAR; INTRAVENOUS; SUBCUTANEOUS at 16:10

## 2023-12-28 RX ADMIN — HYDROXYZINE HYDROCHLORIDE 25 MG: 25 TABLET, FILM COATED ORAL at 19:57

## 2023-12-28 RX ADMIN — Medication 50 MG: at 13:41

## 2023-12-28 RX ADMIN — PHENYLEPHRINE HYDROCHLORIDE 50 MCG: 10 INJECTION INTRAVENOUS at 14:24

## 2023-12-28 RX ADMIN — DEXMEDETOMIDINE HYDROCHLORIDE 4 MCG: 100 INJECTION, SOLUTION INTRAVENOUS at 16:40

## 2023-12-28 RX ADMIN — METRONIDAZOLE 500 MG: 500 INJECTION, SOLUTION INTRAVENOUS at 13:30

## 2023-12-28 RX ADMIN — ONDANSETRON 4 MG: 2 INJECTION INTRAMUSCULAR; INTRAVENOUS at 15:52

## 2023-12-28 RX ADMIN — OXYCODONE HYDROCHLORIDE 10 MG: 10 TABLET ORAL at 22:06

## 2023-12-28 RX ADMIN — FENTANYL CITRATE 50 MCG: 50 INJECTION INTRAMUSCULAR; INTRAVENOUS at 15:47

## 2023-12-28 RX ADMIN — SUGAMMADEX 200 MG: 100 INJECTION, SOLUTION INTRAVENOUS at 15:28

## 2023-12-28 RX ADMIN — HYDROMORPHONE HYDROCHLORIDE 0.4 MG: 0.2 INJECTION, SOLUTION INTRAMUSCULAR; INTRAVENOUS; SUBCUTANEOUS at 16:00

## 2023-12-28 RX ADMIN — HYDROMORPHONE HYDROCHLORIDE 0.2 MG: 0.2 INJECTION, SOLUTION INTRAMUSCULAR; INTRAVENOUS; SUBCUTANEOUS at 15:55

## 2023-12-28 RX ADMIN — FENTANYL CITRATE 50 MCG: 50 INJECTION INTRAMUSCULAR; INTRAVENOUS at 15:35

## 2023-12-28 RX ADMIN — OXYCODONE HYDROCHLORIDE 10 MG: 10 TABLET ORAL at 17:01

## 2023-12-28 RX ADMIN — SODIUM CHLORIDE, POTASSIUM CHLORIDE, SODIUM LACTATE AND CALCIUM CHLORIDE: 600; 310; 30; 20 INJECTION, SOLUTION INTRAVENOUS at 13:37

## 2023-12-28 RX ADMIN — HYDROMORPHONE HYDROCHLORIDE 0.4 MG: 0.2 INJECTION, SOLUTION INTRAMUSCULAR; INTRAVENOUS; SUBCUTANEOUS at 18:36

## 2023-12-28 RX ADMIN — SODIUM CHLORIDE, POTASSIUM CHLORIDE, SODIUM LACTATE AND CALCIUM CHLORIDE: 600; 310; 30; 20 INJECTION, SOLUTION INTRAVENOUS at 18:36

## 2023-12-28 ASSESSMENT — ACTIVITIES OF DAILY LIVING (ADL)
ADLS_ACUITY_SCORE: 22
ADLS_ACUITY_SCORE: 22
ADLS_ACUITY_SCORE: 24
ADLS_ACUITY_SCORE: 22
ADLS_ACUITY_SCORE: 24
ADLS_ACUITY_SCORE: 24

## 2023-12-28 ASSESSMENT — LIFESTYLE VARIABLES: TOBACCO_USE: 1

## 2023-12-28 NOTE — ANESTHESIA POSTPROCEDURE EVALUATION
Patient: Doretha Yu    Procedure: Procedure(s):  CLOSURE, ILEOSTOMY       Anesthesia Type:  General    Note:  Disposition: Admission   Postop Pain Control: Uneventful            Sign Out: Well controlled pain   PONV: No   Neuro/Psych: Uneventful            Sign Out: Acceptable/Baseline neuro status   Airway/Respiratory: Uneventful            Sign Out: Acceptable/Baseline resp. status   CV/Hemodynamics: Uneventful            Sign Out: Acceptable CV status; No obvious hypovolemia; No obvious fluid overload   Other NRE: NONE   DID A NON-ROUTINE EVENT OCCUR? No           Last vitals:  Vitals Value Taken Time   /68 12/28/23 1730   Temp 36.3  C (97.3  F) 12/28/23 1730   Pulse 96 12/28/23 1744   Resp 16 12/28/23 1744   SpO2 93 % 12/28/23 1744   Vitals shown include unfiled device data.    Electronically Signed By: Layton Ventura MD  December 28, 2023  5:46 PM

## 2023-12-28 NOTE — ANESTHESIA CARE TRANSFER NOTE
Patient: Doretha Yu    Procedure: Procedure(s):  CLOSURE, ILEOSTOMY       Diagnosis: Ileostomy in place (H) [Z93.2]  Diagnosis Additional Information: No value filed.    Anesthesia Type:   General     Note:    Oropharynx: oropharynx clear of all foreign objects  Level of Consciousness: awake  Oxygen Supplementation: nasal cannula    Independent Airway: airway patency satisfactory and stable  Dentition: dentition unchanged  Vital Signs Stable: post-procedure vital signs reviewed and stable  Report to RN Given: handoff report given  Patient transferred to: PACU  Comments: Pt remains stable, monitors on alarms in place, report to PACU RN, no complications  Handoff Report: Identifed the Patient, Identified the Reponsible Provider, Reviewed the pertinent medical history, Discussed the surgical course, Reviewed Intra-OP anesthesia mangement and issues during anesthesia, Set expectations for post-procedure period and Allowed opportunity for questions and acknowledgement of understanding  Vitals:  Vitals Value Taken Time   BP     Temp     Pulse 90 12/28/23 1545   Resp 10 12/28/23 1545   SpO2 100 % 12/28/23 1545   Vitals shown include unfiled device data.    Electronically Signed By: AMADA Gatica CRNA  December 28, 2023  3:47 PM

## 2023-12-28 NOTE — OP NOTE
[facility] Colorectal Surgery Operative Report  December 28, 2023    PREOPERATIVE DIAGNOSIS:  1. Loop ileostomy status.  2. Ulcerative colitis  3. S/p total proctocolectomy and ileal-pouch anal anastomosis  4. Dehydration requiring 3x per week IV infusions  5. Chronic pain  6. Morbid obesity   7. Hx of stage III melanoma  8. Generalized anxiety disorder  9. Major depressive disorder  10. DM type II    POSTOPERATIVE DIAGNOSIS:   1. Loop ileostomy status.  2. Ulcerative colitis  3. S/p total proctocolectomy and ileal-pouch anal anastomosis  4. Dehydration requiring 3x per week IV infusions  5. Chronic pain  6. Morbid obesity   7. Hx of stage III melanoma  8. Generalized anxiety disorder  9. Major depressive disorder  10. DM type II    PROCEDURE:  Loop ileostomy takedown.  2.  Small bowel, stapled, side-to-side anastomosis.  3.  Repair of abdominal wall at ileostomy site.    ANESTHESIA: General endotracheal anesthesia    SURGEON:  Quinton Ram MD, PhD     ASSISTANT(S): Kathryn Tabares MD - colorectal surgery fellow; Mariela Crandall PA-C; Ms Crandall's assistance was required for the extra retraction needed for this case and lack of another resident physician or medical student    INDICATIONS FOR PROCEDURE  Doretha Yu is a 48 year old female who underwent TPC + IPAA (pouch 9/2023).  She has been on 3x per week IV infusions for high ileostomy output.  Pouchoscopy and pouchogram looked great (see my 12/12/2023 note). I thoroughly discussed the risks, benefits, and alternatives of operative treatment with the patient and she agreed to proceed.    General risks related to abdominal surgery were reviewed with the patient. These include, but are not limited to, death, myocardial infarction, pneumonia, urinary tract infection, deep venous thrombosis with or without pulmonary embolus, abdominal infection from bowel injury or abscess, fistula, anastomotic leak that may require reoperation and stoma, bowel obstruction, wound  "infection, and bleeding.    OPERATIVE PROCEDURE: After obtaining informed consent, the patient was brought to the operating room and placed in the supine position. Appropriate preoperative mechanical and chemical deep venous thrombosis prophylaxis, as well as preoperative prophylactic parenteral antibiotics were given. General endotracheal anesthesia was gently induced. Bilateral lower extremity pneumatic compression devices were applied and all pressure points were cushioned. The abdomen was then preped and draped in the standard sterile fashion.     After a \"time-out\" was performed, a circumferential incision was made at the mucocutaneous junction of the ileostomy. The subcutaneous tissue was carefully dissected off the terminal ileum down to the level of the fascia. The terminal ileum was then  from the abdominal wall fascia and muscle using sharp dissection. The peritoneal cavity was then entered and any additional adhesions from the ileum to the parietal peritoneum were carefully taken down. After obtaining sufficient bowel length to perform an anastomosis both segments of terminal ileum were aligned, two paired enterotomies were made and the mesentery between these sites was taken with the \"clamp-clamp-tie-tie\" technique.  The arms of the NEHAL 75 mm blue load stapler were inserted into each limb of bowel and the two limbs were brought together, making sure to not incorporate mesentery or adjacent tissues, and a stapled side-to-side functional end-to-end ileo-ileal anastomosis was made with a NEHAL 75 surgical stapler (blue load).     The anastomosis was evaluated through the common enterotomy and no bleeding was visualized. After this, a TA  90 surgical stapler (blue load) was used to transect the former loop ileostomy ends, making sure the NEHAL staple lines were not aligned. The specimen, including the loop ileostomy edges was sent to pathology. The anastomosis appeared to be well vascularized, widely " "patent, and without tension or torsion. The TA staple line was reinforced with interrupted 3-0 PDS at sites of bleeding. The crotch of the NEHAL staple line was reinforced with 3-0 PDS sutures.  Two 3-0 PDS \"crotch\" sutures were placed.    The bowel was then returned to the peritoneal cavity. There was no evidence of bleeding or bowel injury. Instrument, sponge, and needle counts were all correct, as reported to me. The right lower quadrant abdominal wall defect was re approximated with multiple #1 PDS figure-of-eight sutures. The wound was irrigated and dried, and hemostasis was corroborated. The dermis was loosely re-approximated in a circular fashion with a running 0 Vicryl pursestring suture. The wound was packed with betadine soaked kerlix. A sterile dressing was applied. The patient tolerated the procedure well.    COMPLICATIONS: none, immediately.    ESTIMATED BLOOD LOSS: 15 mL.    SPECIMEN(S): Ileostomy trim    OPERATIVE COUNT: Complete.    DRAINS/TUBES: none (no lacy was used in this case)    OPERATIVE FINDINGS:   Stapled side-to-side ileo-ileal anastomosis      Quinton Ram MD, PhD   Division of Colon and Rectal Surgery  [facility]  [phone]    CC:  [facility] Surgery billing.    "

## 2023-12-28 NOTE — ANESTHESIA PREPROCEDURE EVALUATION
Anesthesia Pre-Procedure Evaluation    Patient: Doretha Yu   MRN: [id number] : 1975        Procedure : Procedure(s):  CLOSURE, ILEOSTOMY          Past Medical History:   Diagnosis Date    Abnormal MRI     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     Anxiety     Basal cell carcinoma     Cervical high risk HPV (human papillomavirus) test positive 2019    See problem list    Colitis     Depression     Diabetes mellitus, iatrogenic (H) 2020    Esophageal reflux     Inflammatory arthritis     Insomnia     Intestinal giardiasis 2018    Lumbago     left lower back pain    Lymphedema     Malignant melanoma (H)     Melanoma (H) 10/23/2017    Migraines     Mild persistent asthma     Morbid obesity with BMI of 40.0-44.9, adult (H)     STORMY (obstructive sleep apnea)     Prothrombin deficiency (H)     takes 81mg asa daily    Stroke (cerebrum) (H)     During     TIA (transient ischemic attack)     Type 2 diabetes mellitus (H)     Ulcerative pancolitis (H)       Past Surgical History:   Procedure Laterality Date    APPENDECTOMY      COLONOSCOPY N/A 10/18/2017    Procedure: COLONOSCOPY;  Colon;  Surgeon: Debbie Stephens MD;  [facility]    COLONOSCOPY N/A 2018    Procedure: COMBINED COLONOSCOPY, SINGLE OR MULTIPLE BIOPSY/POLYPECTOMY BY BIOPSY;  colon;  Surgeon: Benita Schumacher MD;  Location: [facility]    COLONOSCOPY      multiple since  to present - about 6 total    DAVINCI ASSISTED TRANSANAL TOTAL MESORECTAL EXCISION N/A 2023    Procedure: COMPLETION PROCTECTOMY, ROBOT-ASSISTED, ILEAL POUCH ANASTAMOSIS;  Surgeon: Quinton Ram MD;  Location: [facility]    DISSECT LYMPH NODE AXILLA Left 10/23/2017    Procedure: DISSECT LYMPH NODE AXILLA;  Left Axillary Lymph Node Dissection ;  Surgeon: Laurent Cool MD;  [facility]: [facility]    EXAM UNDER ANESTHESIA PELVIC N/A 2020    Procedure: EXAM UNDER ANESTHESIA, PELVIS; with Cervical Biopsies, Vaginal  Biopsy and Endocervical Curettings;  Surgeon: Melina Jung MD;  Location: [facility]    GYN SURGERY  ,         LAPAROSCOPIC ASSISTED COLECTOMY N/A 06/15/2021    Procedure: laparoscopic total abdominal colectomy, end ileostomy;  Surgeon: Quinton Ram MD;  [facility]: [facility]    REPAIR MOHS Left 2017    Procedure: REPAIR MOHS;  Left Upper Lid Moh's Reconstruction;  Surgeon: Kisha Bosch MD;  [facility]    SIGMOIDOSCOPY FLEXIBLE N/A 2023    Procedure: Sigmoidoscopy flexible;  Surgeon: Quinton Ram MD;  Location: [facility]      Allergies   Allergen Reactions    Bee Venom Swelling    Azithromycin Diarrhea    Erythromycin      Other reaction(s): GI intolerance, Vomiting    Fentanyl Other (See Comments)     sweating  sweating    Prochlorperazine Fatigue     Other reaction(s): Other (see comments)  Fatigue    Buspirone      Other reaction(s): GI intolerance  vomiting    Erythrocin Nausea and Vomiting    Gluten Meal      Celiac disease    Topamax [Topiramate]      Made her lethargic    Zithromax [Azithromycin Dihydrate] Diarrhea    Enbrel [Etanercept] Hives and Rash      Social History     Tobacco Use    Smoking status: Former     Packs/day: 1.00     Years: 5.00     Additional pack years: 0.00     Total pack years: 5.00     Types: Cigarettes     Quit date: 3/20/1998     Years since quittin.7     Passive exposure: Past    Smokeless tobacco: Never   Substance Use Topics    Alcohol use: Not Currently      Wt Readings from Last 1 Encounters:   23 83.7 kg (184 lb 8.4 oz)        Anesthesia Evaluation   Pt has had prior anesthetic. Type: General.    History of anesthetic complications       ROS/MED HX  ENT/Pulmonary:     (+) sleep apnea, doesn't use CPAP,              tobacco use, Past use,    Intermittent, asthma                  Neurologic:     (+)          CVA,  with deficits,  TIA (left sided weakness),                  Cardiovascular:       METS/Exercise  Tolerance:     Hematologic:       Musculoskeletal:       GI/Hepatic:     (+) GERD,                   Renal/Genitourinary:       Endo:     (+) type I DM,    Not using insulin,          Obesity,       Psychiatric/Substance Use:       Infectious Disease:       Malignancy:       Other:            Physical Exam    Airway        Mallampati: I    Neck ROM: full     Respiratory Devices and Support         Dental       (+) Minor Abnormalities - some fillings, tiny chips      Cardiovascular   cardiovascular exam normal          Pulmonary   pulmonary exam normal                OUTSIDE LABS:  CBC:   Lab Results   Component Value Date    WBC 5.8 12/18/2023    WBC 11.8 (H) 11/25/2023    HGB 11.1 (L) 12/18/2023    HGB 11.8 12/11/2023    HCT 33.5 (L) 12/18/2023    HCT 38.8 11/25/2023     12/18/2023     11/25/2023     BMP:   Lab Results   Component Value Date     12/11/2023     12/04/2023    POTASSIUM 4.2 12/11/2023    POTASSIUM 3.9 12/04/2023    CHLORIDE 100 12/11/2023    CHLORIDE 100 12/04/2023    CO2 22 12/11/2023    CO2 24 12/04/2023    BUN 18.2 12/11/2023    BUN 12.9 12/04/2023    CR 0.93 12/11/2023    CR 0.71 12/04/2023    GLC 99 12/11/2023     (H) 12/04/2023     COAGS:   Lab Results   Component Value Date    PTT 29 10/30/2023    INR 1.07 10/30/2023     POC:   Lab Results   Component Value Date     (H) 06/19/2021    HCG Negative 09/13/2023    HCGS Negative 08/14/2022     HEPATIC:   Lab Results   Component Value Date    ALBUMIN 4.3 12/18/2023    PROTTOTAL 7.4 12/18/2023    ALT 48 12/18/2023    AST 42 12/18/2023    ALKPHOS 182 (H) 12/18/2023    BILITOTAL <0.2 12/18/2023     OTHER:   Lab Results   Component Value Date    LACT 0.8 10/30/2023    A1C 6.5 (H) 10/25/2023    PATRICIA 9.8 12/11/2023    PHOS 4.2 11/05/2023    MAG 1.9 12/18/2023    LIPASE 82 (H) 03/29/2023    AMYLASE 19 (L) 10/30/2023    TSH 2.55 10/30/2023    T4 0.86 04/14/2022    CRP 18.3 (H) 04/14/2022    SED 35 (H) 12/18/2023  "      Anesthesia Plan    ASA Status:  3    NPO Status:  NPO Appropriate    Anesthesia Type: General.     - Airway: ETT   Induction: Intravenous.   Maintenance: Balanced.   Techniques and Equipment:     - Lines/Monitors: 2nd IV     Consents    Anesthesia Plan(s) and associated risks, benefits, and realistic alternatives discussed. Questions answered and patient/representative(s) expressed understanding.     - Discussed: Risks, Benefits and Alternatives for BOTH SEDATION and the PROCEDURE were discussed     - Discussed with:  Patient       Use of blood products discussed: Yes.     - Discussed with: Patient.     - Consented: consented to blood products     Postoperative Care    Pain management: IV analgesics.   PONV prophylaxis: Ondansetron (or other 5HT-3)     Comments:               Anthony Bassett MD    I have reviewed the pertinent notes and labs in the chart from the past 30 days and (re)examined the patient.  Any updates or changes from those notes are reflected in this note.     # Hyponatremia: Lowest Na = 135 mmol/L in last 30 days, will monitor as appropriate          # DMII: A1C = 6.5 % (Ref range: 0.0 - 5.6 %) within past 6 months  # Obesity: Estimated body mass index is 32.69 kg/m  as calculated from the following:    Height as of this encounter: 1.6 m (5' 3\").    Weight as of this encounter: 83.7 kg (184 lb 8.4 oz).      "

## 2023-12-28 NOTE — BRIEF OP NOTE
[facility]    Brief Operative Note    Pre-operative diagnosis: Ileostomy in place (H) [Z93.2]  Post-operative diagnosis Same as pre-operative diagnosis    Procedure: CLOSURE, ILEOSTOMY, N/A - Abdomen    Surgeon: Surgeon(s) and Role:     * Quinton Ram MD - Primary     * Kathryn Tabares MD - Fellow - Assisting  Anesthesia: General   Estimated Blood Loss: 10 mL     Drains: None  Specimens:   ID Type Source Tests Collected by Time Destination   1 : Ileostomy Trim Tissue Other SURGICAL PATHOLOGY EXAM Quinton Ram MD 12/28/2023  2:52 PM      Findings:   Diverting loop ileostomy in place  .  Complications: None.  Implants: * No implants in log *

## 2023-12-28 NOTE — ANESTHESIA PROCEDURE NOTES
Airway       Patient location during procedure: OR       Procedure Start/Stop Times: 12/28/2023 1:44 PM  Staff -        Other Anesthesia Staff: Catalina Damon       Performed By: SRNA  Consent for Airway        Urgency: elective  Indications and Patient Condition       Indications for airway management: toy-procedural       Induction type:intravenous       Mask difficulty assessment: 1 - vent by mask    Final Airway Details       Final airway type: endotracheal airway       Successful airway: ETT - single  Endotracheal Airway Details        ETT size (mm): 7.0       Cuffed: yes       Successful intubation technique: direct laryngoscopy       DL Blade Type: Yeh 2       Grade View of Cords: 1       Adjucts: stylet       Position: Right       Measured from: gums/teeth       Secured at (cm): 22       Bite block used: None    Post intubation assessment        Placement verified by: capnometry, equal breath sounds and chest rise        Number of attempts at approach: 1       Number of other approaches attempted: 0       Secured with: tape       Ease of procedure: easy       Dentition: Unchanged    Medication(s) Administered   Medication Administration Time: 12/28/2023 1:44 PM

## 2023-12-29 ENCOUNTER — PATIENT OUTREACH (OUTPATIENT)
Dept: CARE COORDINATION | Facility: CLINIC | Age: 48
End: 2023-12-29
Payer: COMMERCIAL

## 2023-12-29 LAB
ANION GAP SERPL CALCULATED.3IONS-SCNC: 10 MMOL/L (ref 7–15)
BUN SERPL-MCNC: 7.9 MG/DL (ref 6–20)
CALCIUM SERPL-MCNC: 8.9 MG/DL (ref 8.6–10)
CHLORIDE SERPL-SCNC: 104 MMOL/L (ref 98–107)
CREAT SERPL-MCNC: 0.73 MG/DL (ref 0.51–0.95)
DEPRECATED HCO3 PLAS-SCNC: 24 MMOL/L (ref 22–29)
EGFRCR SERPLBLD CKD-EPI 2021: >90 ML/MIN/1.73M2
ERYTHROCYTE [DISTWIDTH] IN BLOOD BY AUTOMATED COUNT: 17.3 % (ref 10–15)
GLUCOSE BLDC GLUCOMTR-MCNC: 77 MG/DL (ref 70–99)
GLUCOSE BLDC GLUCOMTR-MCNC: 84 MG/DL (ref 70–99)
GLUCOSE BLDC GLUCOMTR-MCNC: 99 MG/DL (ref 70–99)
GLUCOSE BLDC GLUCOMTR-MCNC: 99 MG/DL (ref 70–99)
GLUCOSE SERPL-MCNC: 101 MG/DL (ref 70–99)
HCT VFR BLD AUTO: 31.6 % (ref 35–47)
HGB BLD-MCNC: 10.1 G/DL (ref 11.7–15.7)
MAGNESIUM SERPL-MCNC: 1.8 MG/DL (ref 1.7–2.3)
MCH RBC QN AUTO: 27.7 PG (ref 26.5–33)
MCHC RBC AUTO-ENTMCNC: 32 G/DL (ref 31.5–36.5)
MCV RBC AUTO: 87 FL (ref 78–100)
PHOSPHATE SERPL-MCNC: 4.6 MG/DL (ref 2.5–4.5)
PLATELET # BLD AUTO: 351 10E3/UL (ref 150–450)
POTASSIUM SERPL-SCNC: 4 MMOL/L (ref 3.4–5.3)
RBC # BLD AUTO: 3.65 10E6/UL (ref 3.8–5.2)
SODIUM SERPL-SCNC: 138 MMOL/L (ref 135–145)
WBC # BLD AUTO: 12.6 10E3/UL (ref 4–11)

## 2023-12-29 PROCEDURE — 85027 COMPLETE CBC AUTOMATED: CPT | Performed by: SURGERY

## 2023-12-29 PROCEDURE — 80048 BASIC METABOLIC PNL TOTAL CA: CPT | Performed by: SURGERY

## 2023-12-29 PROCEDURE — 83735 ASSAY OF MAGNESIUM: CPT | Performed by: SURGERY

## 2023-12-29 PROCEDURE — 120N000002 HC R&B MED SURG/OB UMMC

## 2023-12-29 PROCEDURE — 258N000003 HC RX IP 258 OP 636: Performed by: SURGERY

## 2023-12-29 PROCEDURE — 250N000011 HC RX IP 250 OP 636: Performed by: SURGERY

## 2023-12-29 PROCEDURE — 250N000013 HC RX MED GY IP 250 OP 250 PS 637

## 2023-12-29 PROCEDURE — 84100 ASSAY OF PHOSPHORUS: CPT | Performed by: SURGERY

## 2023-12-29 PROCEDURE — 36415 COLL VENOUS BLD VENIPUNCTURE: CPT | Performed by: SURGERY

## 2023-12-29 PROCEDURE — 250N000013 HC RX MED GY IP 250 OP 250 PS 637: Performed by: SURGERY

## 2023-12-29 RX ORDER — METHOCARBAMOL 500 MG/1
500 TABLET, FILM COATED ORAL 4 TIMES DAILY PRN
Qty: 56 TABLET | Refills: 0 | Status: SHIPPED | OUTPATIENT
Start: 2023-12-29 | End: 2024-04-24

## 2023-12-29 RX ADMIN — HYDROMORPHONE HYDROCHLORIDE 0.4 MG: 0.2 INJECTION, SOLUTION INTRAMUSCULAR; INTRAVENOUS; SUBCUTANEOUS at 08:17

## 2023-12-29 RX ADMIN — SODIUM CHLORIDE, POTASSIUM CHLORIDE, SODIUM LACTATE AND CALCIUM CHLORIDE: 600; 310; 30; 20 INJECTION, SOLUTION INTRAVENOUS at 00:05

## 2023-12-29 RX ADMIN — HYDROXYZINE HYDROCHLORIDE 25 MG: 25 TABLET, FILM COATED ORAL at 02:55

## 2023-12-29 RX ADMIN — CYCLOBENZAPRINE 10 MG: 10 TABLET, FILM COATED ORAL at 00:05

## 2023-12-29 RX ADMIN — HYDROMORPHONE HYDROCHLORIDE 0.4 MG: 0.2 INJECTION, SOLUTION INTRAMUSCULAR; INTRAVENOUS; SUBCUTANEOUS at 20:31

## 2023-12-29 RX ADMIN — OXYCODONE HYDROCHLORIDE 10 MG: 10 TABLET ORAL at 17:44

## 2023-12-29 RX ADMIN — CHOLESTYRAMINE 4 G: 4 POWDER, FOR SUSPENSION ORAL at 11:40

## 2023-12-29 RX ADMIN — ACETAMINOPHEN 975 MG: 325 TABLET, FILM COATED ORAL at 08:08

## 2023-12-29 RX ADMIN — METHOCARBAMOL 500 MG: 500 TABLET ORAL at 02:55

## 2023-12-29 RX ADMIN — ARIPIPRAZOLE 2 MG: 2 TABLET ORAL at 08:08

## 2023-12-29 RX ADMIN — CHOLESTYRAMINE 4 G: 4 POWDER, FOR SUSPENSION ORAL at 17:48

## 2023-12-29 RX ADMIN — ENOXAPARIN SODIUM 40 MG: 40 INJECTION SUBCUTANEOUS at 11:28

## 2023-12-29 RX ADMIN — OXYCODONE HYDROCHLORIDE 10 MG: 10 TABLET ORAL at 21:56

## 2023-12-29 RX ADMIN — HYDROMORPHONE HYDROCHLORIDE 0.4 MG: 0.2 INJECTION, SOLUTION INTRAMUSCULAR; INTRAVENOUS; SUBCUTANEOUS at 16:32

## 2023-12-29 RX ADMIN — ACETAMINOPHEN 975 MG: 325 TABLET, FILM COATED ORAL at 20:26

## 2023-12-29 RX ADMIN — HYDROMORPHONE HYDROCHLORIDE 0.4 MG: 0.2 INJECTION, SOLUTION INTRAMUSCULAR; INTRAVENOUS; SUBCUTANEOUS at 10:31

## 2023-12-29 RX ADMIN — VENLAFAXINE 150 MG: 75 TABLET ORAL at 20:26

## 2023-12-29 RX ADMIN — ACETAMINOPHEN 975 MG: 325 TABLET, FILM COATED ORAL at 14:27

## 2023-12-29 RX ADMIN — HYDROMORPHONE HYDROCHLORIDE 0.4 MG: 0.2 INJECTION, SOLUTION INTRAMUSCULAR; INTRAVENOUS; SUBCUTANEOUS at 02:09

## 2023-12-29 RX ADMIN — OXYCODONE HYDROCHLORIDE 10 MG: 10 TABLET ORAL at 11:42

## 2023-12-29 RX ADMIN — HYDROXYZINE HYDROCHLORIDE 25 MG: 25 TABLET, FILM COATED ORAL at 14:27

## 2023-12-29 RX ADMIN — OXYCODONE HYDROCHLORIDE 10 MG: 10 TABLET ORAL at 02:55

## 2023-12-29 RX ADMIN — VENLAFAXINE 150 MG: 75 TABLET ORAL at 08:08

## 2023-12-29 RX ADMIN — HYDROMORPHONE HYDROCHLORIDE 0.4 MG: 0.2 INJECTION, SOLUTION INTRAMUSCULAR; INTRAVENOUS; SUBCUTANEOUS at 04:24

## 2023-12-29 RX ADMIN — OXYCODONE HYDROCHLORIDE 10 MG: 10 TABLET ORAL at 06:28

## 2023-12-29 RX ADMIN — ONDANSETRON 4 MG: 4 TABLET, ORALLY DISINTEGRATING ORAL at 04:26

## 2023-12-29 ASSESSMENT — ACTIVITIES OF DAILY LIVING (ADL)
ADLS_ACUITY_SCORE: 27
ADLS_ACUITY_SCORE: 24
DEPENDENT_IADLS:: INDEPENDENT
ADLS_ACUITY_SCORE: 24
ADLS_ACUITY_SCORE: 27
ADLS_ACUITY_SCORE: 27
ADLS_ACUITY_SCORE: 26
ADLS_ACUITY_SCORE: 27
ADLS_ACUITY_SCORE: 26
ADLS_ACUITY_SCORE: 27
DEPENDENT_IADLS:: INDEPENDENT
ADLS_ACUITY_SCORE: 24

## 2023-12-29 NOTE — PROGRESS NOTES
Clinic Care Coordination Contact  Ambulatory Care Coordination to Inpatient Care Management   Hand-In Communication    Date:  December 29, 2023  Name: Doretha Yu is enrolled in Ambulatory Care Coordination program and I am the Lead Care Coordinator.  CC Contact Information: Epic InBasket + phone  Payor Source: Payor: MEDICA / Plan: MEDICA CHOICE / Product Type: Indemnity /   Current services in place: None   Please see the CC Snaphot and Care Management Flowsheets for specific  details of this Doretha Yu care plan.   Additional details/specific concerns r/t this admission:    Goals of Care I will have a good plan for wound care in the next week Barriers: Anxiety  Unable to find home care services   Strengths: lives with daughter   Patient expressed understanding of goal: Yes   Action steps to achieve this goal:  1. I will have a Community Paramedic visit Not needed   2. Care coordinator will call surgeons office and ostomy RN for supplies   3. I will keep wound clinic appointments   4. I will keep IV hydration appointments  5. I will continue to measure Ostomy output     I will follow this admission in Epic. Please feel free to contact me with questions or for further collaboration in discharge planning.   [facility]   Gisel Marte RN, Care Coordinator   [facility]   E-mail [email or web address]   [phone]

## 2023-12-29 NOTE — DISCHARGE SUMMARY
[facility]  Discharge Summary  Colon and Rectal Surgery     Doretha Yu MRN# [id number]   YOB: 1975 Age: 48 year old     Date of Admission:  12/28/2023  Date of Discharge::  12/31/2023  Admitting Physician:  Quinton Ram MD  Discharge Physician:    Primary Care Physician:        Anna Naidu          Admission Diagnoses:   Ileostomy in place (H) [Z93.2]  Ulcerative colitis  Status post total proctocolectomy and ileal-pouch anal anastomosis  Dehydration requiring intravenous infusion three times weekly  Chronic pain  Morbid obesity  History of stage III melanoma  Generalized anxiety disorder  Major Depressive Disorder  Type 2 Diabetes Mellitus           Discharge Diagnosis:   Status post ileostomy takedown  Ulcerative colitis  Status post total proctocolectomy and ileal-pouch anal anastomosis  Chronic pain  Morbid obesity  History of stage III melanoma  Generalized anxiety disorder  Major Depressive Disorder  Type 2 Diabetes Mellitus         Procedures:   Loop ileostomy takedown.  2.  Small bowel, stapled, side-to-side anastomosis.  3.  Repair of abdominal wall at ileostomy site.              Consultations:   CARE MANAGEMENT / SOCIAL WORK IP CONSULT  NURSING TO CONSULT FOR VASCULAR ACCESS CARE IP CONSULT         Imaging Studies:     Results for orders placed or performed in visit on 12/12/23   XR Colon Water Soluble Diagnostic    Narrative    Examination:  XR COLON WATER SOLUBLE DIAGNOSTIC 12/12/2023 2:12 PM     Comparison: 10/30/2023    History: Post IPAA eval anastamosis---DO NOT INFLATE BALLOON;  Follow-up examination after colorectal surgery    Fluoro time: 0.7 minutes     Technique: Gastrografin was introduced into the colon through a rectal  tube under gravity.  The patient was rolled to fill the colon with  contrast from the rectum to the cecum under fluoroscopy.     Findings: On the  film, the bowel gas pattern is non  obstructed.    A single contrast Gastrografin enema was performed. The anastomosis  was interrogated without demonstration of contrast extravasation  during the study or in the postvoid radiograph.      Impression    Impression: No evidence of anastomotic leak.    I, BENSON GUARDADO DO, attest that I was immediately available to provide  guidance and assistance during the entirety of the procedure.    I have personally reviewed the examination and initial interpretation  and I agree with the findings.    BENSON GUARDADO DO         SYSTEM ID:  [id number]     *Note: Due to a large number of results and/or encounters for the requested time period, some results have not been displayed. A complete set of results can be found in Results Review.              Medications Prior to Admission:     Medications Prior to Admission   Medication Sig Dispense Refill Last Dose    acetaminophen (TYLENOL) 500 MG tablet Take 1,000 mg by mouth every 6 hours as needed for mild pain   12/28/2023    ARIPiprazole (ABILIFY) 2 MG tablet Take 1 tablet (2 mg) by mouth daily (Patient taking differently: Take 2 mg by mouth every morning) 30 tablet 0 12/27/2023    cholestyramine (QUESTRAN) 4 g packet Take 1 packet (4 g) by mouth 2 times daily (with meals) 60 packet 1 Past Week    hydrOXYzine (ATARAX) 25 MG tablet Take 1 tablet (25 mg) by mouth 2 times daily (Patient taking differently: Take 25 mg by mouth 2 times daily as needed) 60 tablet 1 Past Week    medical cannabis (Patient's own supply)    Past Week    medical cannabis (Patient's own supply) See Admin Instructions (The purpose of this order is to document that the patient reports taking medical cannabis.  This is not a prescription, and is not used to certify that the patient has a qualifying medical condition.)   Past Week    metFORMIN (GLUCOPHAGE) 500 MG tablet TAKE 2 TABLETS BY MOUTH 2 TIMES DAILY WITH MEALS 360 tablet 1 Past Week    ondansetron (ZOFRAN ODT) 4 MG ODT tab DISSOLVE ONE TABLET BY  MOUTH EVERY 8 HOURS AS NEEDED FOR NAUSEA. 20 tablet 1 Past Week    Ostomy Supplies MISC 20 each daily 20 each 11 Past Week    venlafaxine (EFFEXOR) 75 MG tablet TAKE TWO TABLETS BY MOUTH TWICE A  tablet 0 12/27/2023    [DISCONTINUED] diphenoxylate-atropine (LOMOTIL) 2.5-0.025 MG tablet Take 2 tablets by mouth 2 times daily   12/27/2023    [DISCONTINUED] loperamide (IMODIUM) 2 MG capsule Take 2 mg by mouth 4 times daily as needed for diarrhea   12/27/2023              Discharge Medications:     Current Discharge Medication List        START taking these medications    Details   !! acetaminophen (TYLENOL) 325 MG tablet Take 3 tablets (975 mg) by mouth 3 times daily    Associated Diagnoses: Ileostomy in place (H)      methocarbamol (ROBAXIN) 500 MG tablet Take 1 tablet (500 mg) by mouth 4 times daily as needed for muscle spasms  Qty: 56 tablet, Refills: 0    Associated Diagnoses: Acute post-operative pain      oxyCODONE IR (ROXICODONE) 10 MG tablet Take 0.5-1 tablets (5-10 mg) by mouth every 6 hours as needed for severe pain Take one whole tab every 6 hours for the next 24 hours then go to half a tab every 6 hours for the following 48 hour then taper off.  Qty: 12 tablet, Refills: 0    Associated Diagnoses: Ileostomy in place (H)      simethicone (MYLICON) 125 MG chewable tablet Take 1 tablet (125 mg) by mouth 2 times daily as needed for intestinal gas  Qty: 10 tablet, Refills: 0    Comments: Take for gas pains  Associated Diagnoses: Ileostomy in place (H)       !! - Potential duplicate medications found. Please discuss with provider.        CONTINUE these medications which have NOT CHANGED    Details   !! acetaminophen (TYLENOL) 500 MG tablet Take 1,000 mg by mouth every 6 hours as needed for mild pain      ARIPiprazole (ABILIFY) 2 MG tablet Take 1 tablet (2 mg) by mouth daily  Qty: 30 tablet, Refills: 0    Associated Diagnoses: Anxiety      cholestyramine (QUESTRAN) 4 g packet Take 1 packet (4 g) by mouth 2  times daily (with meals)  Qty: 60 packet, Refills: 1    Associated Diagnoses: High output ileostomy (H)      hydrOXYzine (ATARAX) 25 MG tablet Take 1 tablet (25 mg) by mouth 2 times daily  Qty: 60 tablet, Refills: 1    Associated Diagnoses: Acute post-operative pain      !! medical cannabis (Patient's own supply)       !! medical cannabis (Patient's own supply) See Admin Instructions (The purpose of this order is to document that the patient reports taking medical cannabis.  This is not a prescription, and is not used to certify that the patient has a qualifying medical condition.)      metFORMIN (GLUCOPHAGE) 500 MG tablet TAKE 2 TABLETS BY MOUTH 2 TIMES DAILY WITH MEALS  Qty: 360 tablet, Refills: 1    Associated Diagnoses: Diabetes mellitus, iatrogenic (H)      ondansetron (ZOFRAN ODT) 4 MG ODT tab DISSOLVE ONE TABLET BY MOUTH EVERY 8 HOURS AS NEEDED FOR NAUSEA.  Qty: 20 tablet, Refills: 1    Associated Diagnoses: Chronic gastritis, presence of bleeding unspecified, unspecified gastritis type      Ostomy Supplies MISC 20 each daily  Qty: 20 each, Refills: 11    Comments: Keith Precut 14504  x20 each  Farson  87266 pouch ,  x20 each  [id number] Sarah ring   x20 each   7760 adhesive remover,  X 1 box   7299 belt   x1 each  Change barrier and pouch 3xweek  Associated Diagnoses: Ileostomy status (H)      venlafaxine (EFFEXOR) 75 MG tablet TAKE TWO TABLETS BY MOUTH TWICE A DAY  Qty: 360 tablet, Refills: 0    Associated Diagnoses: Current episode of major depressive disorder without prior episode, unspecified depression episode severity       !! - Potential duplicate medications found. Please discuss with provider.        STOP taking these medications       diphenoxylate-atropine (LOMOTIL) 2.5-0.025 MG tablet Comments:   Reason for Stopping:         loperamide (IMODIUM) 2 MG capsule Comments:   Reason for Stopping:                        Brief History of Illness:   Ms. Yu is a 48 year old woman who underwent TPC  "and IPAA (09/2023) who has required IV infusions three times weekly for high ileostomy output. Who elected to undergo ileostomy takedown after discussion of the risks and benefits.            Hospital Course:   Post-operatively the patient was admitted to the floor for routine post-operative cares. She was gently fluid resuscitated. Her diet was slowly advanced. At the time of discharge she had return of bowel function, was tolerating a regular diet without nausea or vomiting, and pain was controlled on an oral pain regimen.     Patient is to follow up in the Colon and Rectal Surgery Clinic in 2-3 week with Jena Null NP or Mariela Crandall PA-C and then with Dr. Ram in 2-3 weeks after.          Day of Discharge Physical Exam:   Blood pressure 101/71, pulse 89, temperature 97.6  F (36.4  C), temperature source Oral, resp. rate 16, height 1.6 m (5' 3\"), weight 83.7 kg (184 lb 8.4 oz), SpO2 96%, not currently breastfeeding.    Gen: NAD, responds appropriately to questions   Pulm: Non-labored breathing  Abd: Soft, appropriately tender, no guarding   Former ileostomy site clean without erythema or discharge   Ext:  Warm and well-perfused         Final Pathology Result:   Pending at time of discharge           Discharge Instructions and Follow-Up:     Discharge Procedure Orders   Reason for your hospital stay   Order Comments: 1.  Loop ileostomy takedown.  2.  Small bowel, stapled, side-to-side anastomosis.  3.  Repair of abdominal wall at ileostomy site.     Activity   Order Comments: Your activity upon discharge:     ACTIVITY  -No lifting, pushing, pulling greater than 10 lbs and no strenuous exercise for 6 weeks   -No driving while on narcotic analgesics (i.e. Percocet, oxycodone, Vicodin)  -No driving until you are able to fully twist to both sides or slam on brakes quickly and without any pain  -We encourage walking at least 4-5 times per day     Order Specific Question Answer Comments   Is " discharge order? Yes      Adult [facility] Follow-up and recommended labs and tests   Order Comments: FOLLOW-UP  1.  You will need to follow-up with Jena Null NP or Mariela Crandall PA-C in the Colon and Rectal Surgery clinic in 2-3 week(s) and then with CRS Staff: Dr. Quinton Ram in 4-5 weeks after.  Please contact our Nurses (phone # [phone]) if you have not heard from our clinic in 3 business days afer discharge to schedule a follow-up appointment.    2.  Follow up with your primary care provider in 1-2 weeks after discharge from the hospital to review this hospitalization.     Appointments on [place] and/or [facility] (with [facility] or [facility] provider or service). Call [phone] if you haven't heard regarding these appointments within 7 days of discharge.     When to contact your care team   Order Comments: NOTIFY  Please contact Annamarie Espinal RN or Charis Adkins RN at [phone] for problems after discharge such as:  -Temperature > 101F, chills, rigors, dizziness  -Redness around or purulent drainage from wound  -Inability to tolerate diet, nausea or vomiting  -You stop passing gas, develop significant bloating, abdominal pain  -Have blood in stools/vomit  -Have severe diarrhea/constipation  -Any other questions or concerns.  - At nights (after 4:30pm), on weekends, or if urgent, call [phone] and ask the  to speak with the on-call Colorectal Surgery resident or fellow     Wound care and dressings   Order Comments: Instructions to care for your wound at home:     WOUND CARE  -Inspect your wounds daily for signs of infection (increased redness, drainage, pain)  -Keep your wound clean and dry  -You may shower, but do not soak in tub or pool  -Pack old ostomy sites 1-2 times per day with either Nugauze or minimally moistened gauze and cover with additional gauze and secure in place with medical adhesive.     Discharge Instructions   Order Comments: Medication  Instructions  Some of your medications may have changed. Please take only prescribed and resumed medications     Diet   Order Comments: Follow this diet upon discharge:     DIET  -Low Residue Diet for at least 4-6 weeks unless cleared by Colorectal surgery.  No raw vegetables, fruit skins, fibrous foods that require a lot of chewing, nuts, seeds, corn, popcorn.   -We recommend eating slowly, chewing thoroughly, eating small frequent meals throughout the day  -Stay well hydrated.     Order Specific Question Answer Comments   Is discharge order? Yes             Home Health Care:     Not needed           Discharge Disposition:     Discharged to home      Condition at discharge: Stable    Kathryn Tabares MD on 12/31/2023 at 10:22 AM     Pt was seen and discussed with Dr. Ram    The above plan of care was performed and communicated to me by CRS Staff: Dr. Quinton Ram     I spent 45 minute face-to-face or coordinating care of Doretha Yu. Over 50% of our time on the unit was spent counseling the patient and/or coordinating care as documented in the assessment and plan.     [id number] post op hospital visit

## 2023-12-29 NOTE — PROGRESS NOTES
"  Colorectal Surgery Progress Note  Surgery Cross-Cover  Post Op Check    12/28/2023    Doretha Yu is a 48 year old female POD#0 s/p Procedure(s):  CLOSURE, ILEOSTOMY for Pre-Op Diagnosis Codes:     * Ileostomy in place (H) [Z93.2]    Pt reports their pain is difficult to control, patient tearful while awake citing lower abdominal pain . Denies improvement in pain with oxycodone, dilaudid, robaxin, tylenol, atarax. Denies nausea, chest pain, shortness of breath, or dizziness. Patient is not yet passing flatus or having bowel movements and has not yet voided. Due to void.     /55 (BP Location: Right arm, Patient Position: Semi-Cabrera's, Cuff Size: Adult Regular)   Pulse 84   Temp 97.8  F (36.6  C) (Oral)   Resp 15   Ht 1.6 m (5' 3\")   Wt 83.7 kg (184 lb 8.4 oz)   SpO2 97%   BMI 32.69 kg/m      Gen: A&O x4, NAD   Chest: RR, breathing non-labored on room air   Abdomen: Obese, soft, non-distended, appropriately tender to palpation  Incision: clean, dry, intact covered by dressingswith scant serosanguinous strikethrough  Extremities: warm and well perfused  Devices:  None    A/P: Multimodal pain control. Ordered flexeril. Encourage movement. Continue plan of care. Please call with any questions.    Asia Hughes MD    "

## 2023-12-29 NOTE — PLAN OF CARE
NEURO: A&O x4, anxious at times. Calls appropriately  RESPIRATORY: LS clear and equal bilaterally. On RA, denies SOB.   CARDIAC: WDL, denies cardiac chest pain.   GI/: Due to void. -BS, LBM prior to surgery via old ileostomy.   DIET: Clear liquid diet, tolerating well.   PAIN/NAUSEA: C/o severe abdominal pain rating 8-9/10, PRN Dilaudid given with minimal relief-MD notified. Denies nausea since arrival to unit.   SKIN/INCISION/DRAINS: Abdominal incision packed, covered with gauze-drainage marked. Unable to do full skin assessment d/t pain.   IV ACCESS: R IV infusing LR at 100 mL/hr   ACTIVITY: Not OOB since surgery.  LAB: Reviewed  PLAN: Pain management, pt due to void, encourage OOB activity, continue with POC.

## 2023-12-29 NOTE — PHARMACY-ADMISSION MEDICATION HISTORY
Pharmacist Admission Medication History    Admission medication history is complete. The information provided in this note is only as accurate as the sources available at the time of the update.    Information Source(s): Patient and CareEverywhere/SureScripts via in-person    Pertinent Information:   Pt manages own medications at home and was a good historian. Last doses were entered by RN.  Writer asked patient about several medications with recent fill hx found on SureScripts: Biotene mouth spray, Magic mouthwash, Menthol patch. Pt is no longer using these medications so did not add to list.     Changes made to PTA medication list:  Added: None  Deleted: Benzocaine-menthol lozenge, Triamcinolone 0.1% paste (Pt not taking/using); Cyanocobalamin (Pt has not been taking this medication but would like to restart when able)  Changed: None    Allergies reviewed with patient and updates made in EHR: yes    Medication History Completed By: Gina Mireles RPH 12/29/2023 9:09 AM    PTA Med List   Medication Sig Last Dose    acetaminophen (TYLENOL) 500 MG tablet Take 1,000 mg by mouth every 6 hours as needed for mild pain 12/28/2023    ARIPiprazole (ABILIFY) 2 MG tablet Take 1 tablet (2 mg) by mouth daily (Patient taking differently: Take 2 mg by mouth every morning) 12/27/2023    cholestyramine (QUESTRAN) 4 g packet Take 1 packet (4 g) by mouth 2 times daily (with meals) Past Week    diphenoxylate-atropine (LOMOTIL) 2.5-0.025 MG tablet Take 2 tablets by mouth 2 times daily 12/27/2023    hydrOXYzine (ATARAX) 25 MG tablet Take 1 tablet (25 mg) by mouth 2 times daily (Patient taking differently: Take 25 mg by mouth 2 times daily as needed) Past Week    loperamide (IMODIUM) 2 MG capsule Take 2 mg by mouth 4 times daily as needed for diarrhea 12/27/2023    medical cannabis (Patient's own supply)  Past Week    medical cannabis (Patient's own supply) See Admin Instructions (The purpose of this order is to document that the  patient reports taking medical cannabis.  This is not a prescription, and is not used to certify that the patient has a qualifying medical condition.) Past Week    metFORMIN (GLUCOPHAGE) 500 MG tablet TAKE 2 TABLETS BY MOUTH 2 TIMES DAILY WITH MEALS Past Week    ondansetron (ZOFRAN ODT) 4 MG ODT tab DISSOLVE ONE TABLET BY MOUTH EVERY 8 HOURS AS NEEDED FOR NAUSEA. Past Week    Ostomy Supplies MISC 20 each daily Past Week    venlafaxine (EFFEXOR) 75 MG tablet TAKE TWO TABLETS BY MOUTH TWICE A DAY 12/27/2023

## 2023-12-29 NOTE — PLAN OF CARE
"Goal Outcome Evaluation:      Plan of Care Reviewed With: patient    Overall Patient Progress: no changeOverall Patient Progress: no change     /65 (BP Location: Right arm)   Pulse 82   Temp 98.2  F (36.8  C) (Oral)   Resp 18   Ht 1.6 m (5' 3\")   Wt 83.7 kg (184 lb 8.4 oz)   SpO2 95%   BMI 32.69 kg/m      Status: Stable, VS is WDL, no changes observed.   Pain/Nausea:  Pain managed to tolerable level, denied nausea.   Mobility: Up with SBA.  Diet: FLD.   Labs: Reviewed.   LDAs: PIV x1.   Skin/incisions: Ileostomy takedown site.   Neuro: WDL, able to communicate needs.   Respiratory: WDL, on RA.   Cardiac: WDL.   GI/: Continent of B&B.   New Changes: None.   Plan: Continue to monitor and follow POC.       "

## 2023-12-29 NOTE — PLAN OF CARE
"/55 (BP Location: Right arm, Patient Position: Semi-Cabrera's, Cuff Size: Adult Regular)   Pulse 84   Temp 97.8  F (36.6  C) (Oral)   Resp 15   Ht 1.6 m (5' 3\")   Wt 83.7 kg (184 lb 8.4 oz)   SpO2 97%   BMI 32.69 kg/m      A&O x 4. Reports pain 7-9/10 on abdominal incision. Gave PRN oxycodone, dilaudid, robaxin, atarax, flexeril, scheduled tylenol and T-pump heat pad. SBA with IV pole to bathroom. Voiding, saving. No BM this shift, not passing gas. Reported nausea x 2, gave zofran Iv x 1 and zofran SL x 1. Abdominal incision with packing. Left arm -limb alert-No labs, BP's or IV's. Unable to perform skin 2 RN skin assessment due to pain. PIV infusing  mL/hr.     Goal Outcome Evaluation: Ongoing.       Plan of Care Reviewed With: patient    Overall Patient Progress: no change           "

## 2023-12-29 NOTE — PROVIDER NOTIFICATION
"Colorectal surgery team paged: \"7B MARLEEN Yu (RM [id number]) Pt is c/o severe pain rating 8-9/10 despite Oxycodone and Dilaudid, RASS is +1, pt unable to rest. Pt is requesting additional pain meds please.  Thank you.  Dorothy [phone]\"  "

## 2023-12-29 NOTE — PROGRESS NOTES
Colon and Rectal Surgery  Daily Progress Note    Subjective  Patient reports pain around the incision. Denies chest pain, SOB, bowel function. Urinating well. Has ambulated to bathroom. Tolerating liquids.     Objective  Intake/Output last 24 hrs:  Temp:  [97.3  F (36.3  C)-98.2  F (36.8  C)] 98.2  F (36.8  C)  Pulse:  [82-94] 82  Resp:  [9-23] 18  BP: ()/(55-90) 100/65  SpO2:  [93 %-100 %] 95 %         Physical Exam:  General: awake, alert, in no acute distress  Respiratory: non-labored breathing  Abdomen: soft, appropriately tender, non-distended              Incisions: prior stoma site packing removed. No evidence of erythema or discharge.     Pertinent Labs  Lab Results: personally reviewed.  Lab Results   Component Value Date     12/29/2023     12/11/2023     12/04/2023     06/17/2021     06/16/2021     06/09/2021    CO2 24 12/29/2023    CO2 22 12/11/2023    CO2 24 12/04/2023    CO2 24 05/18/2022    CO2 25 02/07/2022    CO2 25 01/06/2022    CO2 28 06/17/2021    CO2 29 06/16/2021    CO2 31 06/09/2021    BUN 7.9 12/29/2023    BUN 18.2 12/11/2023    BUN 12.9 12/04/2023    BUN 15 05/18/2022    BUN 11 02/07/2022    BUN 13 01/06/2022    BUN 6 06/17/2021    BUN 8 06/16/2021    BUN 23 06/09/2021     Lab Results   Component Value Date    WBC 12.6 12/29/2023    WBC 5.8 12/18/2023    WBC 11.8 11/25/2023    WBC 11.3 06/17/2021    WBC 14.3 06/16/2021    WBC 16.1 06/09/2021    HGB 10.1 12/29/2023    HGB 11.1 12/18/2023    HGB 11.8 12/11/2023    HGB 11.5 06/17/2021    HGB 11.7 06/16/2021    HGB 12.9 06/15/2021    HCT 31.6 12/29/2023    HCT 33.5 12/18/2023    HCT 38.8 11/25/2023    HCT 35.7 06/17/2021    HCT 35.5 06/16/2021    HCT 39.7 06/09/2021    MCV 87 12/29/2023    MCV 82 12/18/2023    MCV 83 11/25/2023    MCV 94 06/17/2021    MCV 95 06/16/2021    MCV 96 06/09/2021     12/29/2023     12/18/2023     11/25/2023     06/18/2021     06/17/2021    PLT  201 06/16/2021       Assessment/Plan: This is a 48 year old female POD #1 s/p ileostomy takedown. Doing well.     Pain: Multimodal pain regimen. Continue dilaudid, oxycodone, acteminophen, flexeril  Anxiety: Resumed home Aripiprazole and venlafaxine   CV: No issues   Respiratory: Still on some supplemental O2. Wean as tolerated. Encouraged ambulation, IS, DB, cough   GI/FEN: s/p ileostomy takedown. AROBF. Will advance diet to FLD. Decrease IVF to 20/hr   Renal: No issues. Good UOP.   Heme: No issues.   Endocrine: Sliding scale insulin   Ppx: Lovenox 40 mg q day   Dispo: general cares. AROBF. Anticipate 2-3 days in hospital.         Discussed with Dr. Yo Tabares MD on 12/29/2023 at 9:59 AM   Colon and rectal surgery fellow

## 2023-12-30 LAB
GLUCOSE BLDC GLUCOMTR-MCNC: 103 MG/DL (ref 70–99)
GLUCOSE BLDC GLUCOMTR-MCNC: 110 MG/DL (ref 70–99)
GLUCOSE BLDC GLUCOMTR-MCNC: 110 MG/DL (ref 70–99)
GLUCOSE BLDC GLUCOMTR-MCNC: 142 MG/DL (ref 70–99)

## 2023-12-30 PROCEDURE — 250N000013 HC RX MED GY IP 250 OP 250 PS 637: Performed by: SURGERY

## 2023-12-30 PROCEDURE — 250N000013 HC RX MED GY IP 250 OP 250 PS 637: Performed by: STUDENT IN AN ORGANIZED HEALTH CARE EDUCATION/TRAINING PROGRAM

## 2023-12-30 PROCEDURE — 999N000127 HC STATISTIC PERIPHERAL IV START W US GUIDANCE

## 2023-12-30 PROCEDURE — 250N000013 HC RX MED GY IP 250 OP 250 PS 637

## 2023-12-30 PROCEDURE — 120N000002 HC R&B MED SURG/OB UMMC

## 2023-12-30 PROCEDURE — 250N000011 HC RX IP 250 OP 636: Performed by: SURGERY

## 2023-12-30 RX ORDER — SIMETHICONE 80 MG
80 TABLET,CHEWABLE ORAL ONCE
Status: COMPLETED | OUTPATIENT
Start: 2023-12-30 | End: 2023-12-30

## 2023-12-30 RX ORDER — LIDOCAINE 4 G/G
1 PATCH TOPICAL
Status: CANCELLED | OUTPATIENT
Start: 2023-12-30

## 2023-12-30 RX ORDER — CALCIUM CARBONATE 500 MG/1
500 TABLET, CHEWABLE ORAL DAILY PRN
Status: DISPENSED | OUTPATIENT
Start: 2023-12-30 | End: 2023-12-31

## 2023-12-30 RX ADMIN — HYDROMORPHONE HYDROCHLORIDE 0.4 MG: 0.2 INJECTION, SOLUTION INTRAMUSCULAR; INTRAVENOUS; SUBCUTANEOUS at 20:08

## 2023-12-30 RX ADMIN — ARIPIPRAZOLE 2 MG: 2 TABLET ORAL at 07:50

## 2023-12-30 RX ADMIN — OXYCODONE HYDROCHLORIDE 10 MG: 10 TABLET ORAL at 18:38

## 2023-12-30 RX ADMIN — CALCIUM CARBONATE (ANTACID) CHEW TAB 500 MG 500 MG: 500 CHEW TAB at 17:27

## 2023-12-30 RX ADMIN — CYCLOBENZAPRINE 10 MG: 10 TABLET, FILM COATED ORAL at 18:59

## 2023-12-30 RX ADMIN — HYDROXYZINE HYDROCHLORIDE 25 MG: 25 TABLET, FILM COATED ORAL at 20:08

## 2023-12-30 RX ADMIN — VENLAFAXINE 150 MG: 75 TABLET ORAL at 20:08

## 2023-12-30 RX ADMIN — HYDROMORPHONE HYDROCHLORIDE 0.4 MG: 0.2 INJECTION, SOLUTION INTRAMUSCULAR; INTRAVENOUS; SUBCUTANEOUS at 04:37

## 2023-12-30 RX ADMIN — CHOLESTYRAMINE 4 G: 4 POWDER, FOR SUSPENSION ORAL at 17:29

## 2023-12-30 RX ADMIN — ACETAMINOPHEN 975 MG: 325 TABLET, FILM COATED ORAL at 07:50

## 2023-12-30 RX ADMIN — OXYCODONE HYDROCHLORIDE 10 MG: 10 TABLET ORAL at 07:50

## 2023-12-30 RX ADMIN — ACETAMINOPHEN 975 MG: 325 TABLET, FILM COATED ORAL at 14:22

## 2023-12-30 RX ADMIN — ONDANSETRON 4 MG: 2 INJECTION INTRAMUSCULAR; INTRAVENOUS at 16:07

## 2023-12-30 RX ADMIN — CHOLESTYRAMINE 4 G: 4 POWDER, FOR SUSPENSION ORAL at 12:02

## 2023-12-30 RX ADMIN — ENOXAPARIN SODIUM 40 MG: 40 INJECTION SUBCUTANEOUS at 12:01

## 2023-12-30 RX ADMIN — OXYCODONE HYDROCHLORIDE 10 MG: 10 TABLET ORAL at 22:42

## 2023-12-30 RX ADMIN — ACETAMINOPHEN 975 MG: 325 TABLET, FILM COATED ORAL at 20:08

## 2023-12-30 RX ADMIN — VENLAFAXINE 150 MG: 75 TABLET ORAL at 07:50

## 2023-12-30 RX ADMIN — HYDROMORPHONE HYDROCHLORIDE 0.4 MG: 0.2 INJECTION, SOLUTION INTRAMUSCULAR; INTRAVENOUS; SUBCUTANEOUS at 16:03

## 2023-12-30 RX ADMIN — CYCLOBENZAPRINE 10 MG: 10 TABLET, FILM COATED ORAL at 07:50

## 2023-12-30 RX ADMIN — OXYCODONE HYDROCHLORIDE 10 MG: 10 TABLET ORAL at 14:22

## 2023-12-30 RX ADMIN — METHOCARBAMOL 500 MG: 500 TABLET ORAL at 17:27

## 2023-12-30 RX ADMIN — SIMETHICONE 80 MG: 80 TABLET, CHEWABLE ORAL at 23:46

## 2023-12-30 RX ADMIN — HYDROMORPHONE HYDROCHLORIDE 0.4 MG: 0.2 INJECTION, SOLUTION INTRAMUSCULAR; INTRAVENOUS; SUBCUTANEOUS at 02:00

## 2023-12-30 RX ADMIN — HYDROMORPHONE HYDROCHLORIDE 0.4 MG: 0.2 INJECTION, SOLUTION INTRAMUSCULAR; INTRAVENOUS; SUBCUTANEOUS at 10:57

## 2023-12-30 ASSESSMENT — ACTIVITIES OF DAILY LIVING (ADL)
ADLS_ACUITY_SCORE: 27
ADLS_ACUITY_SCORE: 27
ADLS_ACUITY_SCORE: 25
ADLS_ACUITY_SCORE: 27
ADLS_ACUITY_SCORE: 25
ADLS_ACUITY_SCORE: 27

## 2023-12-30 NOTE — PLAN OF CARE
"Goal Outcome Evaluation:      Plan of Care Reviewed With: patient    Overall Patient Progress: no changeOverall Patient Progress: no change     /72 (BP Location: Right arm)   Pulse 85   Temp 97.8  F (36.6  C) (Oral)   Resp 16   Ht 1.6 m (5' 3\")   Wt 83.7 kg (184 lb 8.4 oz)   SpO2 95%   BMI 32.69 kg/m        Status: Stable, VS is WDL, reporting increased pain. Team noted. Ileostomy takedown site dressing is CDI.   Pain/Nausea:  Pain is not relief with current regimens per patient at this time, nausea managed with antiemetic.    Mobility: UAL.   Diet: Regular.    Labs: Reviewed.   LDAs: PIV x1.   Skin/incisions: Ileostomy takedown site.   Neuro: WDL, able to communicate needs.   Respiratory: WDL, on RA.   Cardiac: WDL.   GI/: Continent of B&B.   New Changes: None.   Plan: Continue to monitor and follow POC.   "

## 2023-12-30 NOTE — PROVIDER NOTIFICATION
Team paged; ORLANDO Yu 7B31 reported feeling gassy and want some Tums, she does not have Tums on file. Thanks, Jennifer. #[phone].

## 2023-12-30 NOTE — PROGRESS NOTES
Colon and Rectal Surgery  Daily Progress Note    Subjective  Patient reports no acute events overnight.  She is passing gas.  She is ambulating.  She is tolerating liquids.    Objective  Intake/Output last 24 hrs:  Temp:  [98  F (36.7  C)-98.1  F (36.7  C)] 98  F (36.7  C)  Pulse:  [] 101  Resp:  [16] 16  BP: (103-121)/(63-69) 121/69  SpO2:  [92 %-100 %] 100 %         Physical Exam:  General: awake, alert,, in no acute distress  Respiratory: non-labored breathing  Abdomen: soft, appropriately tender, non-distended              Incisions: Prior stoma site without any erythema or discharge.    Pertinent Labs  Lab Results: personally reviewed.  Lab Results   Component Value Date     12/29/2023     12/11/2023     12/04/2023     06/17/2021     06/16/2021     06/09/2021    CO2 24 12/29/2023    CO2 22 12/11/2023    CO2 24 12/04/2023    CO2 24 05/18/2022    CO2 25 02/07/2022    CO2 25 01/06/2022    CO2 28 06/17/2021    CO2 29 06/16/2021    CO2 31 06/09/2021    BUN 7.9 12/29/2023    BUN 18.2 12/11/2023    BUN 12.9 12/04/2023    BUN 15 05/18/2022    BUN 11 02/07/2022    BUN 13 01/06/2022    BUN 6 06/17/2021    BUN 8 06/16/2021    BUN 23 06/09/2021     Lab Results   Component Value Date    WBC 12.6 12/29/2023    WBC 5.8 12/18/2023    WBC 11.8 11/25/2023    WBC 11.3 06/17/2021    WBC 14.3 06/16/2021    WBC 16.1 06/09/2021    HGB 10.1 12/29/2023    HGB 11.1 12/18/2023    HGB 11.8 12/11/2023    HGB 11.5 06/17/2021    HGB 11.7 06/16/2021    HGB 12.9 06/15/2021    HCT 31.6 12/29/2023    HCT 33.5 12/18/2023    HCT 38.8 11/25/2023    HCT 35.7 06/17/2021    HCT 35.5 06/16/2021    HCT 39.7 06/09/2021    MCV 87 12/29/2023    MCV 82 12/18/2023    MCV 83 11/25/2023    MCV 94 06/17/2021    MCV 95 06/16/2021    MCV 96 06/09/2021     12/29/2023     12/18/2023     11/25/2023     06/18/2021     06/17/2021     06/16/2021       Assessment/Plan: This is a 48 year  old female POD #2 s/p ileostomy takedown. Doing well.      Pain: Multimodal pain regimen. Continue dilaudid, oxycodone, acteminophen, flexeril  Anxiety: Resumed home Aripiprazole and venlafaxine   CV: No issues   Respiratory:Encouraged ambulation, IS, DB, cough   GI/FEN: s/p ileostomy takedown.  She is passing some gas.  Will advance diet to general.  Renal: No issues. Good UOP.   Heme: No issues.   Endocrine: Sliding scale insulin   Ppx: Lovenox 40 mg q day   Dispo: general cares. Anticipate 1 to 2 days in hospital.     Discussed with Dr. Yo Tabares MD on 12/30/2023 at 8:12 AM   Colon and rectal surgery fellow

## 2023-12-30 NOTE — CARE PLAN
"Vitals: /63 (BP Location: Right arm)   Pulse 74   Temp 98.1  F (36.7  C) (Oral)   Resp 16   Ht 1.6 m (5' 3\")   Wt 83.7 kg (184 lb 8.4 oz)   SpO2 92%   BMI 32.69 kg/m    Time: 0403-7677  Neuro: A/O x 4, calls appropriately and makes needs known.  Activity: up with SBA.    Pain and N/V: Abdominal pain managed with oxycodone and dilaudid.   GI/: No BM this shift, Voiding spontaneously. AUOP.   Cardiac: Denies cardiac chest pain.   Diet: Full liquid diet.   Respiratory: Denies SOB, on RA  Lines: R PIV infusing LR @ ML/HR.   Incisions/Drains/Skin: Old ostomy site with dressing CDI.   Lab: Reviewed.  New changes this shift: No significant changes this shift, Continue POC.    "

## 2023-12-31 VITALS
WEIGHT: 184.53 LBS | HEART RATE: 89 BPM | BODY MASS INDEX: 32.7 KG/M2 | RESPIRATION RATE: 16 BRPM | OXYGEN SATURATION: 96 % | SYSTOLIC BLOOD PRESSURE: 101 MMHG | HEIGHT: 63 IN | TEMPERATURE: 97.6 F | DIASTOLIC BLOOD PRESSURE: 71 MMHG

## 2023-12-31 LAB
GLUCOSE BLDC GLUCOMTR-MCNC: 115 MG/DL (ref 70–99)
GLUCOSE BLDC GLUCOMTR-MCNC: 93 MG/DL (ref 70–99)

## 2023-12-31 PROCEDURE — 250N000013 HC RX MED GY IP 250 OP 250 PS 637: Performed by: SURGERY

## 2023-12-31 PROCEDURE — 250N000013 HC RX MED GY IP 250 OP 250 PS 637

## 2023-12-31 RX ORDER — ACETAMINOPHEN 325 MG/1
975 TABLET ORAL 3 TIMES DAILY
Start: 2023-12-31

## 2023-12-31 RX ORDER — OXYCODONE HYDROCHLORIDE 10 MG/1
5-10 TABLET ORAL EVERY 6 HOURS PRN
Qty: 12 TABLET | Refills: 0 | Status: SHIPPED | OUTPATIENT
Start: 2023-12-31 | End: 2024-01-03

## 2023-12-31 RX ORDER — SIMETHICONE 125 MG
125 TABLET,CHEWABLE ORAL 2 TIMES DAILY PRN
Qty: 10 TABLET | Refills: 0 | Status: SHIPPED | OUTPATIENT
Start: 2023-12-31 | End: 2024-07-15

## 2023-12-31 RX ADMIN — VENLAFAXINE 150 MG: 75 TABLET ORAL at 08:11

## 2023-12-31 RX ADMIN — ACETAMINOPHEN 975 MG: 325 TABLET, FILM COATED ORAL at 08:11

## 2023-12-31 RX ADMIN — ARIPIPRAZOLE 2 MG: 2 TABLET ORAL at 08:11

## 2023-12-31 RX ADMIN — OXYCODONE HYDROCHLORIDE 10 MG: 10 TABLET ORAL at 08:15

## 2023-12-31 ASSESSMENT — ACTIVITIES OF DAILY LIVING (ADL)
ADLS_ACUITY_SCORE: 25

## 2023-12-31 NOTE — PROVIDER NOTIFICATION
Team paged; Aimee, S 7B 31 is reporting unbearable abdominal pain; has been alternating Oxycodone and Dilaudid throughout the shift, she wants to talk to a Team member. Thanks, Jennifer. #[phone].

## 2023-12-31 NOTE — PLAN OF CARE
Goal Outcome Evaluation:  Pt. discharged at 1145 to home, was accompanied by transport, and left with personal belongings. Pt. received complete discharge paperwork and medications as filled by discharge pharmacy. Pt. was given times of last dose for all discharge medications in writing on discharge medication sheets. Discharge teaching included all medications, pain management, activity restrictions, dressing changes, and signs and symptoms of infection. Pt. had no further questions at the time of discharge and no unmet needs were identified.    Anthony Naidu RN

## 2023-12-31 NOTE — CARE PLAN
"Vitals: /67 (BP Location: Right arm)   Pulse 95   Temp 98.1  F (36.7  C) (Oral)   Resp 18   Ht 1.6 m (5' 3\")   Wt 83.7 kg (184 lb 8.4 oz)   SpO2 91%   BMI 32.69 kg/m    Time: 5597-5931  Neuro: A/O x 4, calls appropriately and makes needs known.  Activity: up with SBA.    Pain and N/V: Abdominal pain managed with oxycodone and dilaudid.   GI/: No BM this shift, Voiding spontaneously. AUOP.   Cardiac: Denies cardiac chest pain.   Diet: Full liquid diet.   Respiratory: Denies SOB, on RA  Lines: R PIV SL.   Incisions/Drains/Skin: Old ostomy site with dressing CDI.   Lab: Reviewed.  New changes this shift: No significant changes this shift, Continue POC  "

## 2023-12-31 NOTE — PROGRESS NOTES
"BRIEF PROVIDER NOTE    Paged by bedside RN that patient was having \"unbearable\" abdominal pain. Evaluated patient at bedside. She describes generalized, migratory sharp abdominal pains that have gradually increased over the last 1-2 hours. Passing gas and had multiple bowel movements, however feels mildly nauseous and has been hiccupping and burping. Feels bloated. She was advanced to FLD today, however states she was too scared to eat anything beyond cream of wheat. Patient is intermittently tearful, expressing fatigue and frustrations over her prolonged health course.    On exam, she is afebrile, hemodynamically stable, breathing comfortably on room air. Anxious affect. Abdomen is obese, soft, moderately distended, diffusely tender to palpation. No rigidity or guarding. Prior ostomy site covered by gauze, c/d/i.    Patient's abdominal pain is most congruent with gas pains given migratory pain in setting of new anastomosis and recent diet advancement.    - Multimodal pain control as tolerated  - Encourage frequent ambulation  - Sleep hygiene   - Diet as tolerated  - Will continue to monitor    Asia Hughes MD  General Surgery Resident      "

## 2023-12-31 NOTE — PROVIDER NOTIFICATION
Doretha Yu. 7B Rm 1943  Unable to locate oxycode prescription. D/C pharm does not have it. Could you print & sign please? Thank you, Anthony Naidu [phone]    Above sent to Resident (MAX)    Anthony Naidu RN

## 2024-01-02 ENCOUNTER — PATIENT OUTREACH (OUTPATIENT)
Dept: CARE COORDINATION | Facility: CLINIC | Age: 49
End: 2024-01-02
Payer: COMMERCIAL

## 2024-01-02 ENCOUNTER — PATIENT OUTREACH (OUTPATIENT)
Dept: SURGERY | Facility: CLINIC | Age: 49
End: 2024-01-02
Payer: COMMERCIAL

## 2024-01-02 DIAGNOSIS — Z09 HOSPITAL DISCHARGE FOLLOW-UP: ICD-10-CM

## 2024-01-02 ASSESSMENT — ACTIVITIES OF DAILY LIVING (ADL): DEPENDENT_IADLS:: INDEPENDENT

## 2024-01-02 NOTE — LETTER
[facility] COORDIN[facility]  [address] [address] [address]     January 3, 2024    Doretha Yu  [address] [address] [address]    Dear Doretha,  Your Care Team congratulates you on your journey to maintain wellness. This document will help guide you on your journey to maintain a healthy lifestyle.  You can use this to help you overcome any barriers you may encounter.  If you should have any questions or concerns, you can contact the members of your Care Team or contact your Primary Care Clinic for assistance.     Health Maintenance  Health Maintenance Reviewed: Not assessed    My Access Plan  Medical Emergency 911   Primary Clinic Line [facility] - [phone]   24 Hour Appointment Line [phone] or  [phone]) (toll-free)   24 Hour Nurse Line [phone] (toll-free)   Preferred Urgent Care     [facility] [place], [place]  [phone]   Preferred Pharmacy [facility] [place] [address]     Behavioral Health Crisis Line The National Suicide Prevention Lifeline at 1-605.548.2152 or 911     My Care Team Members  Patient Care Team         Relationship Specialty Notifications Start End    Anna Naidu MD PCP - General Family Practice  2/25/20     Phone: [phone] Pager: [phone] Fax: [phone]        [address] [address] [address]    Cheo Norton MD MD Ophthalmology  8/24/17     Phone: [phone] Fax: [phone]         [address] [facility] [address]    Kathy Richards MD MD Hematology & Oncology Abnormal results only, Admissions 11/17/17     Phone: [phone] Fax: [phone]         [address] [place] [address]    Antonia Nair MD MD Radiation Oncology  3/20/18     Phone: [phone] Fax: [phone] [address] [address]    Larua Rene, RN Diabetes Educator Diabetes Education   2/7/20     Phone: [phone]         Anna Naidu MD Assigned PCP   2/21/21     Phone: [phone] Pager: [phone] Fax: [phone] [address] [facility] [address]    Cary Castellanos MD MD Endocrinology, Diabetes, and Metabolism  2/9/22     Phone: [phone] Pager: [phone] Fax: [phone]        [address] [place] [address]    Quinton Ram MD Assigned Surgical Provider   5/6/23     Phone: [phone] Pager: [phone] Fax: [phone]        [address] [place] [address]    Anna Naidu MD Assigned Pain Medication Provider   11/4/23     Phone: [phone] Pager: [phone] Fax: [phone] [address] [facility] [address]    Floridalma Aggarwal PA-C Assigned Neuroscience Provider   11/11/23     Phone: [phone] Fax: [phone]         [address] [place] [address]    Paige Coffman NP Assigned Pediatric Specialist Provider   12/2/23     Phone: [phone] Fax: [phone]         [address] [place] [address]    Walter Valente MD Assigned Cancer Care Provider   12/23/23     Phone: [phone] Fax: [phone]         [address] [place] [address]                  Advance Care Plans/Directives Type:   Type Advanced Care Plans/Directives: Advanced Directive - On File  Thank you for providing us with your Advance Directive. We will keep this on file and recommend that it be updated every 2 years, if your health situation changes, if there is a death of one of your health care agents, and/or if there are changes in your marital status.    It has been your Clinic Care Team's pleasure to work with you on your goals.    Colby,  CARLOS [facility] Marte RN, Care Coordinator   [facility]'s   E-mail [email or web address]   [phone]   Your Clinic Care Team

## 2024-01-02 NOTE — TELEPHONE ENCOUNTER
Post Op Note     Called to check on patient postoperatively after hospital discharge.       Patient is s/p ileostomy takedown with Dr. Quinton Ram.   Admitted 12/28 and discharged on 12/31/23.    Pain is well controlled with oxycodone, robaxin, tylenol and ibuprofen,   Patient is eating and drinking normally. Patient is on a low fiber diet.  Encouraged patient to drink 8-10 glasses of water a day.   Patient is passing flats, is having regular daily stools. She is taking Gas-x regularly and having significant gas pain. Recommended a heat pack and peppermint tea.   Patient does not require supplies.  Incentive spirometer, Ostomy Supplies, etc.  Patient is voiding normally and urine is light in color.  Patient is not set up with home care.   Patient's pathology was not reviewed with them.   Patient denies nausea and vomiting.  Patient Reports any fevers or chills.  Patient's incision is CDI. Patient reminded NOT to remove any dressings over their incisions.   Patient is on a activity restriction. Lifting 10 pounds for 6 weeks.   Patient Denies needing any forms completed.   Follow up is set up with Mariela Crandall PA-C on 1/10 and with Dr. Quinton Ram  on 2/13/24.   Encouraged the patient to contact the clinic in the meantime with any fevers, chills, nausea, vomiting, increased colostomy output, no colostomy output, dizziness, lightheadedness, uncontrolled pain, changes to the incisions, or with any questions or concerns.    Patient's questions were answered to their stated satisfaction and they are in agreement with this plan.    TEJINDER Vizcaino [phone]  Colon & Rectal Surgery Clinic  [facility]

## 2024-01-02 NOTE — PROGRESS NOTES
Clinic Care Coordination Contact  [facility]/Voicemail    12/28/2023 - 12/31/2023 (3 days)  [facility]  Ulcerative colitis  Status post total proctocolectomy and ileal-pouch anal anastomosis  Chronic pain  Morbid obesity  History of stage III melanoma  Generalized anxiety disorder  Major Depressive Disorder  Type 2 Diabetes Mellitus          Procedures:   Loop ileostomy takedown.  2.  Small bowel, stapled, side-to-side anastomosis.  3.  Repair of abdominal wall at ileostomy site.           Clinical Data: Care Coordinator Outreach    Outreach Documentation Number of Outreach Attempt   10/27/2023  11:15 AM 1   11/29/2023  12:58 PM 1   12/15/2023  11:27 AM 1   1/2/2024  10:34 AM 1       Left message on patient's voicemail with call back information and requested return call.    Plan: . Care Coordinator will try to reach patient again in 1-2 business days.    [facility]   Gisel Marte RN, Care Coordinator   [facility]'s   E-mail [email or web address]   [phone]

## 2024-01-03 ENCOUNTER — TELEPHONE (OUTPATIENT)
Dept: SURGERY | Facility: CLINIC | Age: 49
End: 2024-01-03
Payer: COMMERCIAL

## 2024-01-03 ENCOUNTER — MYC MEDICAL ADVICE (OUTPATIENT)
Dept: SURGERY | Facility: CLINIC | Age: 49
End: 2024-01-03
Payer: COMMERCIAL

## 2024-01-03 DIAGNOSIS — Z93.2 ILEOSTOMY IN PLACE (H): ICD-10-CM

## 2024-01-03 RX ORDER — OXYCODONE HYDROCHLORIDE 10 MG/1
5 TABLET ORAL EVERY 8 HOURS PRN
Qty: 12 TABLET | Refills: 0 | Status: SHIPPED | OUTPATIENT
Start: 2024-01-03 | End: 2024-02-26

## 2024-01-03 ASSESSMENT — ACTIVITIES OF DAILY LIVING (ADL): DEPENDENT_IADLS:: INDEPENDENT

## 2024-01-03 NOTE — TELEPHONE ENCOUNTER
[facility] Call Center    Phone Message    May a detailed message be left on voicemail: yes     Reason for Call: Medication Question or concern regarding medication   Prescription Clarification  Name of Medication: oxyCODONE IR (ROXICODONE) 10 MG tablet   Prescribing Provider: Jena Madison   [place]   [facility] [address] [address]      What on the order needs clarification? Instruction need clarification.      Action Taken: Message routed to:  [facility]al    Travel Screening: Not Applicable

## 2024-01-03 NOTE — PROGRESS NOTES
Clinic Care Coordination Contact  Clinic Care Coordination Contact  OUTREACH    Referral Information:  Referral Source: IP Handoff    Primary Diagnosis: GI Disorders    Chief Complaint   Patient presents with    Clinic Care Coordination - Post Hospital     Clinic Care Coordination RN        Universal Utilization:   12/28/2023 - 12/31/2023 (3 days)  [facility]  Ulcerative colitis  Status post total proctocolectomy and ileal-pouch anal anastomosis  Chronic pain  Morbid obesity  History of stage III melanoma  Generalized anxiety disorder  Major Depressive Disorder  Type 2 Diabetes Mellitus          Procedures:   Loop ileostomy takedown.  2.  Small bowel, stapled, side-to-side anastomosis.  3.  Repair of abdominal wall at ileostomy site.  Clinic Utilization  Difficulty keeping appointments:: No  Compliance Concerns: No  No-Show Concerns: No  No PCP office visit in Past Year: No  Utilization      No Show Count (past year)  3             ED Visits  9             Hospital Admissions  4                    Current as of: 1/2/2024  4:04 PM                Clinical Concerns:  Current Medical Concerns:  Patient reports her surgical incsion is looking good  Patient states her pain is #8 and is out of her Oxycodone .  Patient states she only received 4 days worth of Oxycodone.  Patient has a call out to her surgeons office to get an RX for Oxycodone.  Patient is passing flatus and has had a BM   Current Behavioral Concerns: No    Education Provided to patient: call surgeons office with questions or concerns   Patient agrees with this plan    Pain  Pain (GOAL):: No  Health Maintenance Reviewed: Not assessed  Clinical Pathway: None    Medication Management:  Medication review status: Medications reviewed.  Changes noted per patient report.       Functional Status:  Dependent ADLs:: Independent  Dependent IADLs:: Independent  Bed or wheelchair confined:: No  Mobility Status:  Independent    Living Situation:  Current living arrangement:: I live in a private home with family    Lifestyle & Psychosocial Needs:    Social Determinants of Health     Food Insecurity: No Food Insecurity (6/28/2021)    Hunger Vital Sign     Worried About Running Out of Food in the Last Year: Never true     Ran Out of Food in the Last Year: Never true   Depression: Not at risk (12/13/2023)    PHQ-2     PHQ-2 Score: 0   Recent Concern: Depression - At risk (11/28/2023)    PHQ-2     PHQ-2 Score: 5   Housing Stability: Not on file   Tobacco Use: Medium Risk (12/28/2023)    Patient History     Smoking Tobacco Use: Former     Smokeless Tobacco Use: Never     Passive Exposure: Past   Financial Resource Strain: High Risk (6/28/2021)    Overall Financial Resource Strain (CARDIA)     Difficulty of Paying Living Expenses: Very hard   Alcohol Use: Not on file   Transportation Needs: No Transportation Needs (6/28/2021)    PRAPARE - Transportation     Lack of Transportation (Medical): No     Lack of Transportation (Non-Medical): No   Physical Activity: Not on file   Interpersonal Safety: Low Risk  (11/28/2023)    Interpersonal Safety     Do you feel physically and emotionally safe where you currently live?: Yes     Within the past 12 months, have you been hit, slapped, kicked or otherwise physically hurt by someone?: No     Within the past 12 months, have you been humiliated or emotionally abused in other ways by your partner or ex-partner?: No   Stress: Stress Concern Present (6/25/2021)    [employer] [place] [employer] - Occupational Stress Questionnaire     Feeling of Stress : Very much   Social Connections: Unknown (6/25/2021)    Social Connection and Isolation Panel [NHANES]     Frequency of Communication with Friends and Family: Not asked     Frequency of Social Gatherings with Friends and Family: Not asked     Attends Spiritism Services: Not asked     Active Member of Clubs or Organizations: Not asked      Attends Club or Organization Meetings: Not asked     Marital Status:      Diet:: Regular  Inadequate nutrition (GOAL):: No  Tube Feeding: No  Inadequate activity/exercise (GOAL):: No  Significant changes in sleep pattern (GOAL): No  Transportation means:: Regular car     Shinto or spiritual beliefs that impact treatment:: No  Mental health DX:: Yes  Mental health DX how managed:: Medication  Mental health management concern (GOAL):: No  Chemical Dependency Status: No Current Concerns  Informal Support system:: Children             Resources and Interventions:  Current Resources:      Community Resources: None  Supplies Currently Used at Home: None  Equipment Currently Used at Home: none  Employment Status: employed full-time         Advance Care Plan/Directive  Advanced Care Plans/Directives on file:: Yes  Type Advanced Care Plans/Directives: Advanced Directive - On File    Referrals Placed: None        Patient/Caregiver understanding: Expresses good understanding of surgical restrictions and discharge instructions     Outreach Frequency: weekly, more frequently as needed  Future Appointments                In 1 week Mariela Crandall PA-C [facility] Colon and Rectal Surgery [facility]    In 1 month Quinton Ram MD [facility] Colon and Rectal Surgery [facility]    In 2 months Renard Davey MD [facility] Gastroenterology Clinic [facility]            Plan: No unmet needs, no further care coordination is needed at this time      [facility]   Gisel Marte RN, Care Coordinator   [facility]'s   E-mail [email or web address]   [phone]   .

## 2024-01-04 ENCOUNTER — TELEPHONE (OUTPATIENT)
Dept: FAMILY MEDICINE | Facility: CLINIC | Age: 49
End: 2024-01-04
Payer: COMMERCIAL

## 2024-01-04 NOTE — TELEPHONE ENCOUNTER
MTM referral from: Transitions of Care (recent hospital discharge or ED visit)    MTM referral outreach attempt #2 on January 4, 2024 at 12:39 PM      Outcome: Patient is not interested at this time, will route to MTM Pharmacist/Provider as an FYI. Thank you for the referral.     Use JULISSA for the carrier/Plan on the flowsheet    Laney Duque [facility]

## 2024-01-10 ENCOUNTER — OFFICE VISIT (OUTPATIENT)
Dept: SURGERY | Facility: CLINIC | Age: 49
End: 2024-01-10
Payer: COMMERCIAL

## 2024-01-10 VITALS
BODY MASS INDEX: 32.71 KG/M2 | SYSTOLIC BLOOD PRESSURE: 113 MMHG | HEART RATE: 82 BPM | DIASTOLIC BLOOD PRESSURE: 77 MMHG | WEIGHT: 184.6 LBS | HEIGHT: 63 IN | OXYGEN SATURATION: 100 %

## 2024-01-10 DIAGNOSIS — Z98.890 S/P CLOSURE OF ILEOSTOMY: Primary | ICD-10-CM

## 2024-01-10 DIAGNOSIS — Z09 FOLLOW-UP EXAMINATION AFTER COLORECTAL SURGERY: ICD-10-CM

## 2024-01-10 DIAGNOSIS — K62.89 ANAL IRRITATION: ICD-10-CM

## 2024-01-10 DIAGNOSIS — R19.7 DIARRHEA, UNSPECIFIED TYPE: ICD-10-CM

## 2024-01-10 PROCEDURE — 99024 POSTOP FOLLOW-UP VISIT: CPT

## 2024-01-10 RX ORDER — DIPHENOXYLATE HCL/ATROPINE 2.5-.025MG
2 TABLET ORAL 2 TIMES DAILY PRN
Qty: 240 TABLET | Refills: 5 | Status: SHIPPED | OUTPATIENT
Start: 2024-01-10

## 2024-01-10 RX ORDER — ZINC OXIDE AND COCOA BUTTER 270; 2052 MG/1; MG/1
1 SUPPOSITORY RECTAL 2 TIMES DAILY PRN
Qty: 24 SUPPOSITORY | Refills: 11 | Status: SHIPPED | OUTPATIENT
Start: 2024-01-10

## 2024-01-10 ASSESSMENT — PAIN SCALES - GENERAL: PAINLEVEL: MODERATE PAIN (4)

## 2024-01-10 NOTE — PROGRESS NOTES
"Colon and Rectal Surgery Postoperative Clinic Note    RE: Doretha Yu  : 1975  JUNIOR: 1/10/2024    Doretha Yu is a very pleasant 48 year old female with a history of ulcerative colitis s/p total abdominal colectomy with IPAA and DLI now status post loop ileostomy takedown with Dr. Ram on 23.    Final Diagnosis   A(1). ILEOSTOMY TRIM:  - Skin and attached small bowel with focal mucosal erosion and mildly active chronic inflammation     Interval history: Doing so much better. Feeling healthy. No abdominal pain. Only having some anal irritation and pressure. Main concern is that she spends most of her mornings and overnights going to the bathroom. She is having about 15 bowel movements daily. Usually she has few bowel movements during the daytime. Taking imodium 7-8 tabs daily.    Physical Examination:  /77 (BP Location: Right arm, Patient Position: Sitting, Cuff Size: Adult Regular)   Pulse 82   Ht 5' 3\"   Wt 184 lb 9.6 oz   LMP 2023 (Approximate)   SpO2 100%   BMI 32.70 kg/m    General: alert, oriented, in no acute distress, sitting comfortably  HEENT: moist mucous membranes  Abdomen: Takedown site on RLQ scabbing in. No erythema or drainage.    Assessment/Plan:  48 year old female with a history of ulcerative colitis s/p total abdominal colectomy with IPAA and DLI now status post loop ileostomy takedown with Dr. Ram on 23. Doing great after surgery. Will refill Lomotil to use in addition to imodium. Can also try Tereso-4 suppositories for the anal irritation. She is seeing Dr. Ram on 24. Contact us in the meantime with questions or concerns. Patient's questions were answered to her stated satisfaction and she is in agreement with this plan.     Medical history:  Past Medical History:   Diagnosis Date    Abnormal MRI     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     Anxiety     Basal cell carcinoma     Cervical high risk HPV (human " papillomavirus) test positive 2019    See problem list    Colitis     Depression     Diabetes mellitus, iatrogenic (H) 2020    Esophageal reflux     Inflammatory arthritis     Insomnia     Intestinal giardiasis 2018    Lumbago     left lower back pain    Lymphedema     Malignant melanoma (H)     Melanoma (H) 10/23/2017    Migraines     Mild persistent asthma     Morbid obesity with BMI of 40.0-44.9, adult (H)     STORMY (obstructive sleep apnea)     Prothrombin deficiency (H)     takes 81mg asa daily    Stroke (cerebrum) (H)     During     TIA (transient ischemic attack)     Type 2 diabetes mellitus (H)     Ulcerative pancolitis (H)        Surgical history:  Past Surgical History:   Procedure Laterality Date    APPENDECTOMY      COLONOSCOPY N/A 10/18/2017    Procedure: COLONOSCOPY;  Colon;  Surgeon: Debbie Stephens MD;  [facility]    COLONOSCOPY N/A 2018    Procedure: COMBINED COLONOSCOPY, SINGLE OR MULTIPLE BIOPSY/POLYPECTOMY BY BIOPSY;  colon;  Surgeon: Benita Schumacher MD;  Location: [facility] GI    COLONOSCOPY      multiple since  to present - about 6 total    DAVINCI ASSISTED TRANSANAL TOTAL MESORECTAL EXCISION N/A 2023    Procedure: COMPLETION PROCTECTOMY, ROBOT-ASSISTED, ILEAL POUCH ANASTAMOSIS;  Surgeon: Quinton Ram MD;  Location: [facility] OR    DISSECT LYMPH NODE AXILLA Left 10/23/2017    Procedure: DISSECT LYMPH NODE AXILLA;  Left Axillary Lymph Node Dissection ;  Surgeon: Laurent Cool MD;  [facility]: [facility]    EXAM UNDER ANESTHESIA PELVIC N/A 2020    Procedure: EXAM UNDER ANESTHESIA, PELVIS; with Cervical Biopsies, Vaginal Biopsy and Endocervical Curettings;  Surgeon: Melina Jung MD;  Location: [facility] OR    GYN SURGERY  ,         LAPAROSCOPIC ASSISTED COLECTOMY N/A 06/15/2021    Procedure: laparoscopic total abdominal colectomy, end ileostomy;  Surgeon: Quinton Ram MD;  Location: [facility]    REPAIR MOHS [facility]  07/25/2017    Procedure: REPAIR MOHS;  Left Upper Lid Moh's Reconstruction;  Surgeon: Kisha Bosch MD;  [facility]: [facility]    SIGMOIDOSCOPY FLEXIBLE N/A 9/5/2023    Procedure: Sigmoidoscopy flexible;  Surgeon: Quinton Ram MD;  [facility]: [facility]    TAKEDOWN ILEOSTOMY N/A 12/28/2023    Procedure: CLOSURE, ILEOSTOMY;  Surgeon: Quinton Ram MD;  Location: [facility]       Problem list:    Patient Active Problem List    Diagnosis Date Noted    Ileostomy in place (H) 12/28/2023     Priority: Medium    High output ileostomy (H) 10/30/2023     Priority: Medium    Post-op pain 09/13/2023     Priority: Medium    Gastroesophageal reflux disease without esophagitis 09/05/2023     Priority: Medium    Biliary dyskinesia 01/11/2023     Priority: Medium    Migraines      Priority: Medium    Colostomy in place (H) 07/16/2021     Priority: Medium    S/P colectomy 07/16/2021     Priority: Medium    Ulcerative colitis without complications, unspecified location (H) 06/03/2021     Priority: Medium    Dehydration 04/13/2021     Priority: Medium    Hematochezia 04/13/2021     Priority: Medium    Diarrhea of infectious origin 04/13/2021     Priority: Medium    C. difficile colitis 04/13/2021     Priority: Medium    Adjustment disorder with mixed emotional features 06/16/2020     Priority: Medium    Polyarthralgia 04/29/2020     Priority: Medium    Drug-induced polyneuropathy (H24) 04/29/2020     Priority: Medium    Pain syndrome, chronic 02/12/2020     Priority: Medium    Drug-induced Cushing's syndrome (H24) 02/12/2020     Priority: Medium    Diabetes mellitus, iatrogenic (H) 01/28/2020     Priority: Medium    High risk HPV infection 01/28/2020     Priority: Medium     Added automatically from request for surgery [id number]      Immunosuppression (H24) 01/28/2020     Priority: Medium     Added automatically from request for surgery [id number]      STORMY (obstructive sleep apnea) 01/27/2020     Priority: Medium     1/2019  (241#)-AHI 24, lowest oxygen saturation was 86%, no periodic limb movement were noted, CPAP 8 cm/H20 was effective.      Secondary lymphedema 01/27/2020     Priority: Medium    Chronic neutrophilia 01/27/2020     Priority: Medium    Cervical high risk HPV (human papillomavirus) test positive 12/13/2019     Priority: Medium     2011 NIL pap.  2015 NIL pap, Neg HPV.  12/13/19 NIL pap , + HR HPV 16. Plan colp.   1/6/19 Failed colp exam secondary to anatomic constraints with gyn. Referred to gyn/onc.   2/25/20 Colpo bx and ECC Negative for dysplasia. Plan cotest in 1 year.   8/20/21 NIL pap, Neg HPV. Plan cotest in 1 year per provider.   9/28/22 Lost to follow-up for pap tracking       Immunosuppressed status (H24) 09/05/2019     Priority: Medium    Ulcerative colitis with complication, unspecified location (H) 09/05/2019     Priority: Medium    Morbid obesity (H) 09/05/2019     Priority: Medium    Arthritis, rheumatoid (H) 11/26/2018     Priority: Medium    Hypocalcemia 05/15/2018     Priority: Medium    Anemia 05/14/2018     Priority: Medium    Menstrual irregularity 05/14/2018     Priority: Medium    Arthralgia of both knees 05/12/2018     Priority: Medium     Formatting of this note might be different from the original.  Work-up in progress. There is concern for inflammatory arthritis.      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     Priority: Medium    Malignant melanoma of left upper  extremity including shoulder (H) 10/12/2017     Priority: Medium    Metastatic malignant melanoma (H) 10/10/2017     Priority: Medium    Prothrombin mutation (H24) 04/05/2017     Priority: Medium     On daily aspirin 81 mg per hematology's recommendations from 2008      Vision changes 04/01/2017     Priority: Medium    Mild intermittent asthma without complication 11/08/2013     Priority: Medium    Moderate major depression (H) 06/24/2013     Priority: Medium    Anxiety state 09/13/2012     Priority: Medium     Problem list name updated by automated process. Provider to review      Esophageal reflux 09/13/2012     Priority: Medium    DUB (dysfunctional uterine bleeding) 07/28/2011     Priority: Medium    CARDIOVASCULAR SCREENING; LDL GOAL LESS THAN 160 07/28/2011     Priority: Low       Medications:  Current Outpatient Medications   Medication Sig Dispense Refill    diphenoxylate-atropine (LOMOTIL) 2.5-0.025 MG tablet Take 2 tablets by mouth 2 times daily as needed for diarrhea 240 tablet 5    methylcellulose (CITRUCEL) powder Take 0.55 g (1.925 teaspoonful) by mouth daily 454 g 3    Rectal Protectant-Emollient (CALMOL-4) 76-10 % SUPP Place 1 suppository rectally 2 times daily as needed (anal irritation) 24 suppository 11    acetaminophen (TYLENOL) 325 MG tablet Take 3 tablets (975 mg) by mouth 3 times daily      acetaminophen (TYLENOL) 500 MG tablet Take 1,000 mg by mouth every 6 hours as needed for mild pain      ARIPiprazole (ABILIFY) 2 MG tablet Take 1 tablet (2 mg) by mouth daily (Patient taking differently: Take 2 mg by mouth every morning) 30 tablet 0    cholestyramine (QUESTRAN) 4 g packet Take 1 packet (4 g) by mouth 2 times daily (with meals) 60 packet 1    hydrOXYzine (ATARAX) 25 MG tablet Take 1 tablet (25 mg) by mouth 2 times daily (Patient taking differently: Take 25 mg by mouth 2 times daily as needed) 60 tablet 1    medical cannabis (Patient's own supply)       medical cannabis (Patient's own supply)  See Admin Instructions (The purpose of this order is to document that the patient reports taking medical cannabis.  This is not a prescription, and is not used to certify that the patient has a qualifying medical condition.)      metFORMIN (GLUCOPHAGE) 500 MG tablet TAKE 2 TABLETS BY MOUTH 2 TIMES DAILY WITH MEALS 360 tablet 1    methocarbamol (ROBAXIN) 500 MG tablet Take 1 tablet (500 mg) by mouth 4 times daily as needed for muscle spasms 56 tablet 0    ondansetron (ZOFRAN ODT) 4 MG ODT tab DISSOLVE ONE TABLET BY MOUTH EVERY 8 HOURS AS NEEDED FOR NAUSEA. 20 tablet 1    Ostomy Supplies MISC 20 each daily 20 each 11    oxyCODONE IR (ROXICODONE) 10 MG tablet Take 0.5 tablets (5 mg) by mouth every 8 hours as needed for severe pain Take one whole tab every 6 hours for the next 24 hours then go to half a tab every 6 hours for the following 48 hour then taper off. 12 tablet 0    simethicone (MYLICON) 125 MG chewable tablet Take 1 tablet (125 mg) by mouth 2 times daily as needed for intestinal gas 10 tablet 0    venlafaxine (EFFEXOR) 75 MG tablet TAKE TWO TABLETS BY MOUTH TWICE A  tablet 0       Allergies:  Allergies   Allergen Reactions    Bee Venom Swelling    Azithromycin Diarrhea    Erythromycin      Other reaction(s): GI intolerance, Vomiting    Fentanyl Other (See Comments)     sweating  sweating    Prochlorperazine Fatigue     Other reaction(s): Other (see comments)  Fatigue    Buspirone      Other reaction(s): GI intolerance  vomiting    Erythrocin Nausea and Vomiting    Gluten Meal      Celiac disease    Topamax [Topiramate]      Made her lethargic    Zithromax [Azithromycin Dihydrate] Diarrhea    Enbrel [Etanercept] Hives and Rash       Family history:  Family History   Problem Relation Age of Onset    Cancer Mother 45        lung    Neurologic Disorder Mother         epilepsy    Lipids Father     Gastrointestinal Disease Father         diverticulitis     Depression Father     Colitis Father     Colon  "Cancer Father     Diverticulitis Father     Depression Sister     Cancer Maternal Grandmother     Blood Disease Maternal Grandmother         lymphoma     Arthritis Maternal Grandmother     Diabetes Maternal Grandmother     Depression Maternal Grandmother     Macular Degeneration Maternal Grandmother     Glaucoma Maternal Grandmother     Diabetes Maternal Grandfather     Cerebrovascular Disease Maternal Grandfather     Blood Disease Maternal Grandfather     Heart Disease Maternal Grandfather     Glaucoma Maternal Grandfather     Cancer Paternal Grandmother     Cancer - colorectal Paternal Grandmother     Colitis Paternal Grandmother     Colon Cancer Paternal Grandmother     Diverticulitis Paternal Grandmother     Respiratory Paternal Grandfather         emphysema     Colitis Paternal Grandfather     Colon Cancer Paternal Grandfather     Diverticulitis Paternal Grandfather     Heart Disease Daughter     Asthma Daughter     Melanoma No family hx of     Anesthesia Reaction No family hx of     Clotting Disorder No family hx of        Social history:  Social History     Tobacco Use    Smoking status: Former     Packs/day: 1.00     Years: 5.00     Additional pack years: 0.00     Total pack years: 5.00     Types: Cigarettes     Quit date: 3/20/1998     Years since quittin.8     Passive exposure: Past    Smokeless tobacco: Never   Substance Use Topics    Alcohol use: Not Currently     Marital status: .    Nursing Notes:   Tiffanie Naidu  1/10/2024 10:32 AM  Signed  Chief Complaint   Patient presents with    Post-op Visit       Vitals:    01/10/24 1030   BP: 113/77   BP Location: Right arm   Patient Position: Sitting   Cuff Size: Adult Regular   Pulse: 82   SpO2: 100%   Weight: 184 lb 9.6 oz   Height: 5' 3\"       Body mass index is 32.7 kg/m .    Tiffanie Naidu CMA       15 minutes spent on the date of the encounter doing chart review, history and exam, documentation and further activities as noted above. "   This is a postop visit.      Mariela Crandall PA-C  Colon and Rectal Surgery  [facility]

## 2024-01-10 NOTE — NURSING NOTE
"Chief Complaint   Patient presents with    Post-op Visit       Vitals:    01/10/24 1030   BP: 113/77   BP Location: Right arm   Patient Position: Sitting   Cuff Size: Adult Regular   Pulse: 82   SpO2: 100%   Weight: 184 lb 9.6 oz   Height: 5' 3\"       Body mass index is 32.7 kg/m .    Tiffanie Naidu CMA    "

## 2024-01-18 DIAGNOSIS — F32.9 CURRENT EPISODE OF MAJOR DEPRESSIVE DISORDER WITHOUT PRIOR EPISODE, UNSPECIFIED DEPRESSION EPISODE SEVERITY: ICD-10-CM

## 2024-01-18 RX ORDER — VENLAFAXINE 75 MG/1
150 TABLET ORAL 2 TIMES DAILY
Qty: 360 TABLET | Refills: 0 | Status: SHIPPED | OUTPATIENT
Start: 2024-01-18 | End: 2024-02-26

## 2024-01-18 NOTE — TELEPHONE ENCOUNTER
"Requested Prescriptions   Pending Prescriptions Disp Refills    venlafaxine (EFFEXOR) 75 MG tablet [Pharmacy Med Name: VENLAFAXINE HCL 75MG TABS] 360 tablet 0     Sig: TAKE TWO TABLETS BY MOUTH TWICE A DAY       Serotonin-Norepinephrine Reuptake Inhibitors  Failed - 1/18/2024  9:03 AM        Failed - PHQ-9 score of less than 5 in past 6 months     Please review last PHQ-9 score.           Failed - SANDRA-7 score of less than 5 in past 6 months.     Please review last SANDRA-7 score.           Passed - Blood pressure under 140/90 in past 12 months     BP Readings from Last 3 Encounters:   01/10/24 113/77   12/31/23 101/71   12/20/23 98/76                 Passed - Medication is active on med list        Passed - Patient is age 18 or older        Passed - No active pregnancy on record        Passed - Normal serum creatinine on file in past 12 months     Recent Labs   Lab Test 12/29/23  0649   CR 0.73       Ok to refill medication if creatinine is low          Passed - No positive pregnancy test in past 12 months        Passed - Recent (6 mo) or future (30 days) visit within the authorizing provider's specialty     Patient had office visit in the last 6 months or has a visit in the next 30 days with authorizing provider or within the authorizing provider's specialty.  See \"Patient Info\" tab in inbasket, or \"Choose Columns\" in Meds & Orders section of the refill encounter.                 "

## 2024-01-19 DIAGNOSIS — F41.9 ANXIETY: ICD-10-CM

## 2024-01-23 RX ORDER — ARIPIPRAZOLE 2 MG/1
2 TABLET ORAL EVERY MORNING
Qty: 30 TABLET | Refills: 0 | Status: SHIPPED | OUTPATIENT
Start: 2024-01-23 | End: 2024-02-26

## 2024-01-23 RX ORDER — ARIPIPRAZOLE 2 MG/1
2 TABLET ORAL DAILY
Qty: 30 TABLET | OUTPATIENT
Start: 2024-01-23

## 2024-01-23 NOTE — TELEPHONE ENCOUNTER
ARIPiprazole (ABILIFY) 2 MG tablet           Last Written Prescription Date:  12/14/23  Last Fill Quantity: 30,   # refills: 0  Last Office Visit : 1/10/24 post op/ Raz  Future Office visit:  2/13/24 Yo    Routing refill request to provider for review/approval because:  Drug not on the [facility] [facility] or [facility] refill protocol .     Last ordered at discharge post ileostomy takedown 12/14/23   Initially prescribed with inpatient ( medicine service ) and consult/psychiatry  on 10/31/23

## 2024-01-29 ENCOUNTER — LAB (OUTPATIENT)
Dept: LAB | Facility: CLINIC | Age: 49
End: 2024-01-29
Payer: COMMERCIAL

## 2024-01-29 DIAGNOSIS — R19.7 DIARRHEA: ICD-10-CM

## 2024-01-29 DIAGNOSIS — Z98.890 S/P CLOSURE OF ILEOSTOMY: ICD-10-CM

## 2024-01-29 DIAGNOSIS — Z98.890 S/P CLOSURE OF ILEOSTOMY: Primary | ICD-10-CM

## 2024-01-29 DIAGNOSIS — K91.850 POUCHITIS (H): ICD-10-CM

## 2024-01-29 DIAGNOSIS — E13.9 DIABETES MELLITUS, IATROGENIC (H): Primary | ICD-10-CM

## 2024-01-29 LAB
ANION GAP SERPL CALCULATED.3IONS-SCNC: 16 MMOL/L (ref 7–15)
BASOPHILS # BLD AUTO: 0 10E3/UL (ref 0–0.2)
BASOPHILS NFR BLD AUTO: 0 %
BUN SERPL-MCNC: 9.5 MG/DL (ref 6–20)
CALCIUM SERPL-MCNC: 9.4 MG/DL (ref 8.6–10)
CHLORIDE SERPL-SCNC: 102 MMOL/L (ref 98–107)
CREAT SERPL-MCNC: 0.69 MG/DL (ref 0.51–0.95)
DEPRECATED HCO3 PLAS-SCNC: 19 MMOL/L (ref 22–29)
EGFRCR SERPLBLD CKD-EPI 2021: >90 ML/MIN/1.73M2
EOSINOPHIL # BLD AUTO: 0.2 10E3/UL (ref 0–0.7)
EOSINOPHIL NFR BLD AUTO: 2 %
ERYTHROCYTE [DISTWIDTH] IN BLOOD BY AUTOMATED COUNT: 15.2 % (ref 10–15)
GLUCOSE SERPL-MCNC: 134 MG/DL (ref 70–99)
HBA1C MFR BLD: 5.8 % (ref 0–5.6)
HCT VFR BLD AUTO: 32.7 % (ref 35–47)
HGB BLD-MCNC: 10.5 G/DL (ref 11.7–15.7)
IMM GRANULOCYTES # BLD: 0 10E3/UL
IMM GRANULOCYTES NFR BLD: 0 %
LYMPHOCYTES # BLD AUTO: 2.5 10E3/UL (ref 0.8–5.3)
LYMPHOCYTES NFR BLD AUTO: 26 %
MCH RBC QN AUTO: 27.5 PG (ref 26.5–33)
MCHC RBC AUTO-ENTMCNC: 32.1 G/DL (ref 31.5–36.5)
MCV RBC AUTO: 86 FL (ref 78–100)
MONOCYTES # BLD AUTO: 0.5 10E3/UL (ref 0–1.3)
MONOCYTES NFR BLD AUTO: 5 %
NEUTROPHILS # BLD AUTO: 6.4 10E3/UL (ref 1.6–8.3)
NEUTROPHILS NFR BLD AUTO: 66 %
PLATELET # BLD AUTO: 359 10E3/UL (ref 150–450)
POTASSIUM SERPL-SCNC: 3.9 MMOL/L (ref 3.4–5.3)
RBC # BLD AUTO: 3.82 10E6/UL (ref 3.8–5.2)
SODIUM SERPL-SCNC: 137 MMOL/L (ref 135–145)
WBC # BLD AUTO: 9.7 10E3/UL (ref 4–11)

## 2024-01-29 PROCEDURE — 83036 HEMOGLOBIN GLYCOSYLATED A1C: CPT

## 2024-01-29 PROCEDURE — 80048 BASIC METABOLIC PNL TOTAL CA: CPT

## 2024-01-29 PROCEDURE — 36415 COLL VENOUS BLD VENIPUNCTURE: CPT

## 2024-01-29 PROCEDURE — 85025 COMPLETE CBC W/AUTO DIFF WBC: CPT

## 2024-01-29 RX ORDER — CIPROFLOXACIN 500 MG/1
500 TABLET, FILM COATED ORAL 2 TIMES DAILY
Qty: 20 TABLET | Refills: 0 | Status: SHIPPED | OUTPATIENT
Start: 2024-01-29 | End: 2024-02-08

## 2024-01-29 RX ORDER — METRONIDAZOLE 500 MG/1
500 TABLET ORAL 3 TIMES DAILY
Qty: 30 TABLET | Refills: 0 | Status: SHIPPED | OUTPATIENT
Start: 2024-01-29 | End: 2024-02-08

## 2024-02-01 ENCOUNTER — TELEPHONE (OUTPATIENT)
Dept: FAMILY MEDICINE | Facility: CLINIC | Age: 49
End: 2024-02-01
Payer: COMMERCIAL

## 2024-02-01 NOTE — PROGRESS NOTES
"Colon and Rectal Surgery Clinic Note    RE: Doretha Yu.  : 1975.  JUNIOR: 2024.    Reason for visit: post op.    HPI: Doretha Fernandez is a 46 year old female with ulcerative colitis. She presents today for a post op visit. On 6/15/2021 I took her to the OR for a TAC with end ileostomy (laparoscopic). Then we proceeded with robotic completion proctectomy with creation of J-pouch with IPAA, takedown of end ileostomy, and diverting loop ileostomy on 23 with subsequent ileostomy takedown on 2023.      Interval History: She did have elevated of liver enzymes after her second procedure but they have continued to downtrend since then. She is having 5 bowel movements a day. Function is really excellent.  I think she had an episode of low-grade pouchitis two weeks ago but this went away with a week of cipro/flagyl.  This is \"the best she has felt in many, many years\" and she says her energy is much improved.         Medications:  Current Outpatient Medications   Medication Sig Dispense Refill    acetaminophen (TYLENOL) 325 MG tablet Take 3 tablets (975 mg) by mouth 3 times daily      acetaminophen (TYLENOL) 500 MG tablet Take 1,000 mg by mouth every 6 hours as needed for mild pain      ARIPiprazole (ABILIFY) 2 MG tablet Take 1 tablet (2 mg) by mouth every morning ** VISIT DUE/LAST FILL ** 30 tablet 0    cholestyramine (QUESTRAN) 4 g packet Take 1 packet (4 g) by mouth 2 times daily (with meals) 60 packet 1    ciprofloxacin (CIPRO) 500 MG tablet Take 1 tablet (500 mg) by mouth 2 times daily for 10 days 20 tablet 0    diphenoxylate-atropine (LOMOTIL) 2.5-0.025 MG tablet Take 2 tablets by mouth 2 times daily as needed for diarrhea 240 tablet 5    hydrOXYzine (ATARAX) 25 MG tablet Take 1 tablet (25 mg) by mouth 2 times daily (Patient taking differently: Take 25 mg by mouth 2 times daily as needed) 60 tablet 1    medical cannabis (Patient's own supply)       medical cannabis (Patient's own supply) See Admin " Instructions (The purpose of this order is to document that the patient reports taking medical cannabis.  This is not a prescription, and is not used to certify that the patient has a qualifying medical condition.)      metFORMIN (GLUCOPHAGE) 500 MG tablet TAKE 2 TABLETS BY MOUTH 2 TIMES DAILY WITH MEALS 360 tablet 1    methocarbamol (ROBAXIN) 500 MG tablet Take 1 tablet (500 mg) by mouth 4 times daily as needed for muscle spasms 56 tablet 0    methylcellulose (CITRUCEL) powder Take 0.55 g (1.925 teaspoonful) by mouth daily 454 g 3    metroNIDAZOLE (FLAGYL) 500 MG tablet Take 1 tablet (500 mg) by mouth 3 times daily for 10 days 30 tablet 0    ondansetron (ZOFRAN ODT) 4 MG ODT tab DISSOLVE ONE TABLET BY MOUTH EVERY 8 HOURS AS NEEDED FOR NAUSEA. 20 tablet 1    Ostomy Supplies MISC 20 each daily 20 each 11    oxyCODONE IR (ROXICODONE) 10 MG tablet Take 0.5 tablets (5 mg) by mouth every 8 hours as needed for severe pain Take one whole tab every 6 hours for the next 24 hours then go to half a tab every 6 hours for the following 48 hour then taper off. 12 tablet 0    Rectal Protectant-Emollient (CALMOL-4) 76-10 % SUPP Place 1 suppository rectally 2 times daily as needed (anal irritation) 24 suppository 11    simethicone (MYLICON) 125 MG chewable tablet Take 1 tablet (125 mg) by mouth 2 times daily as needed for intestinal gas 10 tablet 0    venlafaxine (EFFEXOR) 75 MG tablet TAKE TWO TABLETS BY MOUTH TWICE A  tablet 0       ROS:  A complete review of systems was performed with the patient and all systems negative except as per HPI.    Physical Examination:  LMP 11/01/2023 (Approximate)   General: Well hydrated. No acute distress.  Abdomen: Soft, NT, there is a small fascial defect at her initial ileostomy site (upper right quadrant of abdomen); the incisions at the more recent ileostomy site are healing well    ASSESSMENT  49 y/o lady with UC s/p robotic proctectomy, IPAA, and DLI with subsequent DLI takedown in  December 2023, doing well.     Risks, benefits, and alternatives of operative treatment were thoroughly discussed with the patient, he/she understands these well and agrees to proceed.     PLAN  1. Pouchoscopy in 1 year; then maybe every three years  2. Referral to Arcadio Carrillo for consideration of hernia repair  3. Follow up with me as needed     30 minutes spent on the date of the encounter doing chart review, history and exam, imaging review, documentation and further activities as noted above.    Quinton Ram MD, PhD    Division of Colon and Rectal Surgery  [facility]    Referring Provider:  No referring provider defined for this encounter.     Primary Care Provider:  Anna Naidu

## 2024-02-01 NOTE — LETTER
My Asthma Action Plan    Name: Doretha Yu   YOB: 1975  Date: 2/1/2024   My doctor: Anna Naidu MD   My clinic: [facility]        My Rescue Medicine:   Albuterol inhaler (Proair/Ventolin/Proventil HFA)  2-4 puffs EVERY 4 HOURS as needed. Use a spacer if recommended by your provider.   My Asthma Severity:   Intermittent / Exercise Induced  Know your asthma triggers: upper respiratory infections             GREEN ZONE   Good Control  I feel good  No cough or wheeze  Can work, sleep and play without asthma symptoms       Take your asthma control medicine every day.     If exercise triggers your asthma, take your rescue medication  15 minutes before exercise or sports, and  During exercise if you have asthma symptoms  Spacer to use with inhaler: If you have a spacer, make sure to use it with your inhaler             YELLOW ZONE Getting Worse  I have ANY of these:  I do not feel good  Cough or wheeze  Chest feels tight  Wake up at night   Keep taking your Green Zone medications  Start taking your rescue medicine:  every 20 minutes for up to 1 hour. Then every 4 hours for 24-48 hours.  If you stay in the Yellow Zone for more than 12-24 hours, contact your doctor.  If you do not return to the Green Zone in 12-24 hours or you get worse, start taking your oral steroid medicine if prescribed by your provider.           RED ZONE Medical Alert - Get Help  I have ANY of these:  I feel awful  Medicine is not helping  Breathing getting harder  Trouble walking or talking  Nose opens wide to breathe       Take your rescue medicine NOW  If your provider has prescribed an oral steroid medicine, start taking it NOW  Call your doctor NOW  If you are still in the Red Zone after 20 minutes and you have not reached your doctor:  Take your rescue medicine again and  Call 911 or go to the emergency room right away    See your regular doctor within 2 weeks of an Emergency Room or Urgent Care  visit for follow-up treatment.          Annual Reminders:  Meet with Asthma Educator,  Flu Shot in the Fall, consider Pneumonia Vaccination for patients with asthma (aged 19 and older).    Pharmacy:    [facility] [place] [address] [address] [facility]    Electronically signed by Anna Naidu MD   Date: 02/01/24                    Asthma Triggers  How To Control Things That Make Your Asthma Worse    Triggers are things that make your asthma worse.  Look at the list below to help you find your triggers and   what you can do about them. You can help prevent asthma flare-ups by staying away from your triggers.      Trigger                                                          What you can do   Cigarette Smoke  Tobacco smoke can make asthma worse. Do not allow smoking in your home, car or around you.  Be sure no one smokes at a child s day care or school.  If you smoke, ask your health care provider for ways to help you quit.  Ask family members to quit too.  Ask your health care provider for a referral to Quit Plan to help you quit smoking, or call 3-192-416-PLAN.     Colds, Flu, Bronchitis  These are common triggers of asthma. Wash your hands often.  Don t touch your eyes, nose or mouth.  Get a flu shot every year.     Dust Mites  These are tiny bugs that live in cloth or carpet. They are too small to see. Wash sheets and blankets in hot water every week.   Encase pillows and mattress in dust mite proof covers.  Avoid having carpet if you can. If you have carpet, vacuum weekly.   Use a dust mask and HEPA vacuum.   Pollen and Outdoor Mold  Some people are allergic to trees, grass, or weed pollen, or molds. Try to keep your windows closed.  Limit time out doors when pollen count is high.   Ask you health care provider about taking medicine during allergy season.     Animal Dander  Some people are allergic to skin flakes, urine or saliva from pets with fur  or feathers. Keep pets with fur or feathers out of your home.    If you can t keep the pet outdoors, then keep the pet out of your bedroom.  Keep the bedroom door closed.  Keep pets off cloth furniture and away from stuffed toys.     Mice, Rats, and Cockroaches  Some people are allergic to the waste from these pests.   Cover food and garbage.  Clean up spills and food crumbs.  Store grease in the refrigerator.   Keep food out of the bedroom.   Indoor Mold  This can be a trigger if your home has high moisture. Fix leaking faucets, pipes, or other sources of water.   Clean moldy surfaces.  Dehumidify basement if it is damp and smelly.   Smoke, Strong Odors, and Sprays  These can reduce air quality. Stay away from strong odors and sprays, such as perfume, powder, hair spray, paints, smoke incense, paint, cleaning products, candles and new carpet.   Exercise or Sports  Some people with asthma have this trigger. Be active!  Ask your doctor about taking medicine before sports or exercise to prevent symptoms.    Warm up for 5-10 minutes before and after sports or exercise.     Other Triggers of Asthma  Cold air:  Cover your nose and mouth with a scarf.  Sometimes laughing or crying can be a trigger.  Some medicines and food can trigger asthma.

## 2024-02-01 NOTE — TELEPHONE ENCOUNTER
Patient Quality Outreach    Patient is due for the following:   Pap, mammo    Next Steps:   - Schedule annual wellness/pap  - ACT/AAP  - Vaccine update    Type of outreach:    Sent 7fgame message.      Questions for provider review:    None           Ruth Ann Gudino, CMA

## 2024-02-13 ENCOUNTER — OFFICE VISIT (OUTPATIENT)
Dept: SURGERY | Facility: CLINIC | Age: 49
End: 2024-02-13
Payer: COMMERCIAL

## 2024-02-13 VITALS — DIASTOLIC BLOOD PRESSURE: 76 MMHG | SYSTOLIC BLOOD PRESSURE: 112 MMHG | OXYGEN SATURATION: 100 % | HEART RATE: 100 BPM

## 2024-02-13 DIAGNOSIS — K43.5 PARASTOMAL HERNIA WITHOUT OBSTRUCTION OR GANGRENE: ICD-10-CM

## 2024-02-13 DIAGNOSIS — Z93.2 HIGH OUTPUT ILEOSTOMY (H): Primary | ICD-10-CM

## 2024-02-13 DIAGNOSIS — R19.8 HIGH OUTPUT ILEOSTOMY (H): Primary | ICD-10-CM

## 2024-02-13 DIAGNOSIS — Z09 FOLLOW-UP EXAMINATION AFTER COLORECTAL SURGERY: ICD-10-CM

## 2024-02-13 PROCEDURE — 99024 POSTOP FOLLOW-UP VISIT: CPT | Performed by: SURGERY

## 2024-02-13 ASSESSMENT — PAIN SCALES - GENERAL: PAINLEVEL: MILD PAIN (3)

## 2024-02-13 NOTE — LETTER
"2024       RE: Doretha Yu  [address]  [facility] [address]     Dear Colleague,    Thank you for referring your patient, Doretha Yu, to the [facility] [place] [facility]. Please see a copy of my visit note below.    Colon and Rectal Surgery Clinic Note    RE: Doretha Yu.  : 1975.  JUNIOR: 2024.    Reason for visit: post op.    HPI: Doretha Fernandez is a 46 year old female with ulcerative colitis. She presents today for a post op visit. On 6/15/2021 I took her to the OR for a TAC with end ileostomy (laparoscopic). Then we proceeded with robotic completion proctectomy with creation of J-pouch with IPAA, takedown of end ileostomy, and diverting loop ileostomy on 23 with subsequent ileostomy takedown on 2023.      Interval History: She did have elevated of liver enzymes after her second procedure but they have continued to downtrend since then. She is having 5 bowel movements a day. Function is really excellent.  I think she had an episode of low-grade pouchitis two weeks ago but this went away with a week of cipro/flagyl.  This is \"the best she has felt in many, many years\" and she says her energy is much improved.         Medications:  Current Outpatient Medications   Medication Sig Dispense Refill    acetaminophen (TYLENOL) 325 MG tablet Take 3 tablets (975 mg) by mouth 3 times daily      acetaminophen (TYLENOL) 500 MG tablet Take 1,000 mg by mouth every 6 hours as needed for mild pain      ARIPiprazole (ABILIFY) 2 MG tablet Take 1 tablet (2 mg) by mouth every morning ** VISIT DUE/LAST FILL ** 30 tablet 0    cholestyramine (QUESTRAN) 4 g packet Take 1 packet (4 g) by mouth 2 times daily (with meals) 60 packet 1    ciprofloxacin (CIPRO) 500 MG tablet Take 1 tablet (500 mg) by mouth 2 times daily for 10 days 20 tablet 0    diphenoxylate-atropine (LOMOTIL) 2.5-0.025 MG tablet Take 2 tablets by " mouth 2 times daily as needed for diarrhea 240 tablet 5    hydrOXYzine (ATARAX) 25 MG tablet Take 1 tablet (25 mg) by mouth 2 times daily (Patient taking differently: Take 25 mg by mouth 2 times daily as needed) 60 tablet 1    medical cannabis (Patient's own supply)       medical cannabis (Patient's own supply) See Admin Instructions (The purpose of this order is to document that the patient reports taking medical cannabis.  This is not a prescription, and is not used to certify that the patient has a qualifying medical condition.)      metFORMIN (GLUCOPHAGE) 500 MG tablet TAKE 2 TABLETS BY MOUTH 2 TIMES DAILY WITH MEALS 360 tablet 1    methocarbamol (ROBAXIN) 500 MG tablet Take 1 tablet (500 mg) by mouth 4 times daily as needed for muscle spasms 56 tablet 0    methylcellulose (CITRUCEL) powder Take 0.55 g (1.925 teaspoonful) by mouth daily 454 g 3    metroNIDAZOLE (FLAGYL) 500 MG tablet Take 1 tablet (500 mg) by mouth 3 times daily for 10 days 30 tablet 0    ondansetron (ZOFRAN ODT) 4 MG ODT tab DISSOLVE ONE TABLET BY MOUTH EVERY 8 HOURS AS NEEDED FOR NAUSEA. 20 tablet 1    Ostomy Supplies MISC 20 each daily 20 each 11    oxyCODONE IR (ROXICODONE) 10 MG tablet Take 0.5 tablets (5 mg) by mouth every 8 hours as needed for severe pain Take one whole tab every 6 hours for the next 24 hours then go to half a tab every 6 hours for the following 48 hour then taper off. 12 tablet 0    Rectal Protectant-Emollient (CALMOL-4) 76-10 % SUPP Place 1 suppository rectally 2 times daily as needed (anal irritation) 24 suppository 11    simethicone (MYLICON) 125 MG chewable tablet Take 1 tablet (125 mg) by mouth 2 times daily as needed for intestinal gas 10 tablet 0    venlafaxine (EFFEXOR) 75 MG tablet TAKE TWO TABLETS BY MOUTH TWICE A  tablet 0       ROS:  A complete review of systems was performed with the patient and all systems negative except as per HPI.    Physical Examination:  [facility] 11/01/2023 (Approximate)   General:  Well hydrated. No acute distress.  Abdomen: Soft, NT, there is a small fascial defect at her initial ileostomy site (upper right quadrant of abdomen); the incisions at the more recent ileostomy site are healing well    ASSESSMENT  49 y/o lady with UC s/p robotic proctectomy, IPAA, and DLI with subsequent DLI takedown in December 2023, doing well.     Risks, benefits, and alternatives of operative treatment were thoroughly discussed with the patient, he/she understands these well and agrees to proceed.     PLAN  1. Pouchoscopy in 1 year; then maybe every three years  2. Referral to Arcadio Carrillo for consideration of hernia repair  3. Follow up with me as needed     30 minutes spent on the date of the encounter doing chart review, history and exam, imaging review, documentation and further activities as noted above.        Referring Provider:  No referring provider defined for this encounter.     Primary Care Provider:  Anna Naidu      Again, thank you for allowing me to participate in the care of your patient.      Sincerely,    Quinton Ram MD

## 2024-02-13 NOTE — PROGRESS NOTES
Clinic Care Coordination Contact  [facility]/Voicemail    Clinical Data: Care Coordinator Outreach    ED visit 10/26/2023  Abdominal pain and dehydration     Outreach Documentation Number of Outreach Attempt   10/27/2023  11:15 AM 1       Left message on patient's voicemail with call back information and requested return call.    Plan:. Care Coordinator will try to reach patient again in 1-2 business days.    [facility]   Gisel Marte RN, Care Coordinator   [facility]'s   E-mail [email or web address]   [phone]       Unable to assess due to medical condition

## 2024-02-13 NOTE — NURSING NOTE
Chief Complaint   Patient presents with    Post-op Visit       Vitals:    02/13/24 1129   BP: 112/76   BP Location: Right arm   Patient Position: Sitting   Cuff Size: Adult Regular   Pulse: 100   SpO2: 100%       There is no height or weight on file to calculate BMI.    Tariq Shelby EMT-P

## 2024-02-15 NOTE — TELEPHONE ENCOUNTER
REFERRAL INFORMATION:  Referring Provider: Dr. Ram  Referring Clinic: [facility] - Colon and Rectal  Reason for Visit/Diagnosis: Parastomal Hernia       FUTURE VISIT INFORMATION:  Appointment Date: 2/23/2024  Appointment Time: Noon     NOTES RECORD STATUS  DETAILS   OFFICE NOTE from Referring Provider Internal ealth:  2/13/24 - CR OV with Dr. Ram   OFFICE NOTE from Other Specialists Internal ealth:  1/10/24 - CR OV with KEITH Lazar  [facility]13[facility]23 [facility]  [facility] - [facility] with Dr. Naidu   [facility] DISCHARGE SUMMARY/ ED VISITS  Internal [facility]:  12/28/23 - Admission with Dr. Ram  1/11/23 - Admission with Dr. Pérez  8/14/22 - ED OV with Dr. Camron Saucedo - [place]:  3/29/23 - ED OV with Dr. Pennington  1/10/23 - ED OV with Dr. Soliz   OPERATIVE REPORT Internal ea:  12/28/23 - OP Note for CLOSURE ILEOSTOMY with Dr. Ram  9/5/23 - OP Note for COMPLETION PROCTECTOMY, ROBOT-ASSISTED, ILEAL POUCH ANASTAMOSIS with Dr. Ram  6/15/21 - OP Note for laparoscopic total abdominal colectomy, end ileostomy with Dr. Spring   ENDOSCOPY (EGD)  Care Everywhere [place]:  2/5/19 - EGD   PERTINENT LABS Internal    IMAGING (CT, MRI, US, XR)  Internal [facility]:  12/12/23 - XR Colon  12/11/23 - MRI Abdomen MRCP  11/25/23 - CT Chest  10/30/23  - CT Enterography  10/23/23 - CT Chest/Abd/Pelvis  9/14/23 - US Abdomen  9/13/23 - CT Abd/Pelvis

## 2024-02-23 ENCOUNTER — PRE VISIT (OUTPATIENT)
Dept: SURGERY | Facility: CLINIC | Age: 49
End: 2024-02-23

## 2024-02-23 ENCOUNTER — OFFICE VISIT (OUTPATIENT)
Dept: SURGERY | Facility: CLINIC | Age: 49
End: 2024-02-23
Attending: SURGERY
Payer: COMMERCIAL

## 2024-02-23 VITALS
HEIGHT: 63 IN | WEIGHT: 190.8 LBS | HEART RATE: 73 BPM | DIASTOLIC BLOOD PRESSURE: 80 MMHG | SYSTOLIC BLOOD PRESSURE: 122 MMHG | OXYGEN SATURATION: 99 % | BODY MASS INDEX: 33.81 KG/M2

## 2024-02-23 DIAGNOSIS — K43.9 VENTRAL HERNIA WITHOUT OBSTRUCTION OR GANGRENE: Primary | ICD-10-CM

## 2024-02-23 PROCEDURE — 99214 OFFICE O/P EST MOD 30 MIN: CPT | Mod: 24 | Performed by: SURGERY

## 2024-02-23 ASSESSMENT — PAIN SCALES - GENERAL: PAINLEVEL: NO PAIN (0)

## 2024-02-23 NOTE — PATIENT INSTRUCTIONS
You were diagnosed with a ventral hernia and were offered robot-assisted repair (possibly with mesh, based on size), with Dr. Carrillo. To schedule surgery, call Otto Stewart at [phone]. You will need a CT before surgery to allow Dr Carrillo ot assess size of defect  ---      The risks of hernia repair were reviewed with the patient.    These risks combine the risks of abdominal surgery and the risks of hernia repair, including mesh implantation, and were described to the patient as follows:    Abdominal surgery risks:    These include, but are not limited to, death, myocardial infarction, pneumonia, urinary tract infection, deep venous thrombosis with or without pulmonary embolus, abdominal infection from bowel injury or abscess, bowel obstruction, wound infection, and bleeding.    Hernia repair risks:    Food and Drug Administration Comments on Hernia Repair Surgery and Mesh Implantation.    http://www.fda.gov/MedicalDevices/ProductsandMedicalProcedures/ImplantsandProsthetics/HerniaSurgicalMesh/default.htm      Hernia Repair Complications    Based on FDA s analysis of medical device adverse event reports and of peer-reviewed, scientific literature, the most common adverse events for all surgical repair of hernias--with or without mesh--are pain, infection, hernia recurrence, scar-like tissue that sticks tissues together (adhesion), blockage of the large or small intestine (obstruction), bleeding, abnormal connection between organs, vessels, or intestines (fistula), fluid build-up at the surgical site (seroma), and a hole in neighboring tissues or organs (perforation).    The most common adverse events following hernia repair with mesh are pain, infection, hernia recurrence, adhesion, and bowel obstruction. Some other potential adverse events that can occur following hernia repair with mesh are mesh migration and mesh shrinkage (contraction).    Many complications related to hernia repair with surgical mesh that have  been reported to the FDA have been associated with recalled mesh products that are no longer on the market. Pain, infection, recurrence, adhesion, obstruction, and perforation are the most common complications associated with recalled mesh. In the FDA s analysis of medical adverse event reports to the FDA, recalled mesh products were the main cause of bowel perforation and obstruction complications.    Please refer to the recall notices here and here for more information if you have recalled mesh. For more information on the recalled products, please visit the FDA Medical Device Recall website. Please visit the Medical & Radiation Emitting Device Database to search a specific type of surgical mesh.    If you are unsure about the specific mesh  and brand used in your surgery and have questions about your hernia repair, contact your surgeon or the facility where your surgery was performed to obtain the information from your medical record.

## 2024-02-23 NOTE — NURSING NOTE
"Chief Complaint   Patient presents with    New Patient     Parastomal hernia       Vitals:    02/23/24 1134   BP: 122/80   BP Location: Right arm   Patient Position: Sitting   Cuff Size: Adult Large   Pulse: 73   SpO2: 99%   Weight: 86.5 kg (190 lb 12.8 oz)   Height: 1.6 m (5' 3\")       Body mass index is 33.8 kg/m .                          Negrito Portillo, EMT    "

## 2024-02-23 NOTE — LETTER
2/23/2024       RE: Doretha Yu  [address]  [facility] [address]     Dear Colleague,    Thank you for referring your patient, Doretha Yu, to the [facility] GENER[facility] [place] at [facility]. Please see a copy of my visit note below.    New Hernia Consultation Note      Doretha Yu  [id number]  1975    Requesting Provider: Quinton Ram    Dear Anna Naidu,    I was asked by Quinton Ram to see this patient for the following problem: Doretha Yu is a 48 year old female who presents to clinic today for the following health issues     CHIEF COMPLAINT:  Abdominal bulge      ASSESSMENT/PLAN:  incisional  Hernia size is less than 5cm in size.    Assessment & Plan  Problem List Items Addressed This Visit    None  Visit Diagnoses       Ventral hernia without obstruction or gangrene    -  Primary    Relevant Orders    Case Request: HERNIORRHAPHY, VENTRAL, ROBOT-ASSISTED (Completed)    CT Abdomen Pelvis w/o Contrast    Adult PAC Visit Referral         Symptomatic ventral hernia after 3 prior [facility] MIS operations  -Will need CT before surgery to asssess size of defect  -Can call [facility] to schedule  -Had thorough discussion re Mesh use in UC patients. May consider Phasix vs permanent based on CT    Arcadio Carrillo MD      30 minutes spent by me on the date of the encounter doing chart review, history and exam, documentation and further activities per the note    HISTORY OF PRESENT ILLNESS:  Location: middle abdomen, likely at prior ileostomy site, cheated R of midline  Severity: Mild     2023- DLI takedown    2023-  .  Laparoscopic robotic-assisted completion proctectomy.  2.  Takedown of end ileostomy  3.  Creation of ileal J-pouch with ileoanal anastomosis   4.  Flexible pouchoscopy/sigmoidoscopy.  5.  Diverting loop ileostomy creation    2021-  TAC, End ileostomy for UC    NUTRITIONAL STATUS:  Lab Results   Component  Value Date    ALBUMIN 4.3 2023    ALBUMIN 3.5 2022    ALBUMIN 3.1 2021       Body mass index is 33.8 kg/m .    Patient is not immunosuppressed.    Patient is not a current smoker.    Past Medical History:   Diagnosis Date    Abnormal MRI     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     Anxiety     Basal cell carcinoma     Cervical high risk HPV (human papillomavirus) test positive 2019    See problem list    Colitis     Depression     Diabetes mellitus, iatrogenic (H) 2020    Esophageal reflux     Inflammatory arthritis     Insomnia     Intestinal giardiasis 2018    Lumbago     left lower back pain    Lymphedema     Malignant melanoma (H)     Melanoma (H) 10/23/2017    Migraines     Mild persistent asthma     Morbid obesity with BMI of 40.0-44.9, adult (H)     STORMY (obstructive sleep apnea)     Prothrombin deficiency (H)     takes 81mg asa daily    Stroke (cerebrum) (H)     During     TIA (transient ischemic attack)     Type 2 diabetes mellitus (H)     Ulcerative pancolitis (H)        Patient Active Problem List   Diagnosis    CARDIOVASCULAR SCREENING; LDL GOAL LESS THAN 160    DUB (dysfunctional uterine bleeding)    Anxiety state    Esophageal reflux    Moderate major depression (H)    Mild intermittent asthma without complication    Vision changes    Prothrombin mutation (H24)    Metastatic malignant melanoma (H)    Malignant melanoma of left upper extremity including shoulder (H)    Functional diarrhea    Colitis    Rectal bleeding    Bilateral leg cramps    Rash and nonspecific skin eruption    Other chronic pain    Immunosuppressed status (H24)    Ulcerative colitis with complication, unspecified location (H)    Morbid obesity (H)    Cervical high risk HPV (human papillomavirus) test positive    STORMY (obstructive sleep apnea)    Secondary lymphedema    Chronic neutrophilia    Diabetes mellitus, iatrogenic (H)    High risk HPV infection     Abdominal pain    Anemia    Candidiasis of mouth    Hypocalcemia    Malaise    Menstrual irregularity    Arthritis, rheumatoid (H)    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 difficile colitis    Ulcerative colitis without complications, unspecified location (H)    Colostomy in place (H)    S/P colectomy    Polyarthralgia    Drug-induced polyneuropathy (H24)    Arthralgia of both knees    Adjustment disorder with mixed emotional features    Migraines    Biliary dyskinesia    Gastroesophageal reflux disease without esophagitis    Post-op pain    High output ileostomy (H)    Ileostomy in place (H)       Past Surgical History:   Procedure Laterality Date    APPENDECTOMY      COLONOSCOPY N/A 10/18/2017    Procedure: COLONOSCOPY;  Colon;  Surgeon: Debbie Stephens MD;  [facility]    COLONOSCOPY N/A 2018    Procedure: COMBINED COLONOSCOPY, SINGLE OR MULTIPLE BIOPSY/POLYPECTOMY BY BIOPSY;  colon;  Surgeon: Benita Schumacher MD;  Location: [facility]    COLONOSCOPY      multiple since  to present - about 6 total    DAVINCI ASSISTED TRANSANAL TOTAL MESORECTAL EXCISION N/A 2023    Procedure: COMPLETION PROCTECTOMY, ROBOT-ASSISTED, ILEAL POUCH ANASTAMOSIS;  Surgeon: Quinton Ram MD;  [facility]    DISSECT LYMPH NODE AXILLA Left 10/23/2017    Procedure: DISSECT LYMPH NODE AXILLA;  Left Axillary Lymph Node Dissection ;  Surgeon: Laurent Cool MD;  [facility]: [facility]    EXAM UNDER ANESTHESIA PELVIC N/A 2020    Procedure: EXAM UNDER ANESTHESIA, PELVIS; with Cervical Biopsies, Vaginal Biopsy and Endocervical Curettings;  Surgeon: Melina Jung MD;  Location: [facility]    GYN SURGERY  ,         LAPAROSCOPIC ASSISTED COLECTOMY N/A 06/15/2021    Procedure: laparoscopic total  abdominal colectomy, end ileostomy;  Surgeon: Quinton Ram MD;  [facility]: [facility]    REPAIR MOHS Left 07/25/2017    Procedure: REPAIR MOHS;  Left Upper Lid Moh's Reconstruction;  Surgeon: Kisha Bosch MD;  [facility]    SIGMOIDOSCOPY FLEXIBLE N/A 9/5/2023    Procedure: Sigmoidoscopy flexible;  Surgeon: Quinton Ram MD;  [facility]: [facility]    TAKEDOWN ILEOSTOMY N/A 12/28/2023    Procedure: CLOSURE, ILEOSTOMY;  Surgeon: Quinton Ram MD;  [facility]       MEDICATIONS:  Current Outpatient Medications   Medication    acetaminophen (TYLENOL) 325 MG tablet    acetaminophen (TYLENOL) 500 MG tablet    ARIPiprazole (ABILIFY) 2 MG tablet    cholestyramine (QUESTRAN) 4 g packet    diphenoxylate-atropine (LOMOTIL) 2.5-0.025 MG tablet    hydrOXYzine (ATARAX) 25 MG tablet    medical cannabis (Patient's own supply)    medical cannabis (Patient's own supply)    metFORMIN (GLUCOPHAGE) 500 MG tablet    methocarbamol (ROBAXIN) 500 MG tablet    methylcellulose (CITRUCEL) powder    ondansetron (ZOFRAN ODT) 4 MG ODT tab    Ostomy Supplies MISC    Rectal Protectant-Emollient (CALMOL-4) 76-10 % SUPP    simethicone (MYLICON) 125 MG chewable tablet    venlafaxine (EFFEXOR) 75 MG tablet    oxyCODONE IR (ROXICODONE) 10 MG tablet     No current facility-administered medications for this visit.     Facility-Administered Medications Ordered in Other Visits   Medication    lidocaine 1 % 9 mL    sodium bicarbonate 8.4 % injection 1 mEq       ALLERGIES:  Allergies   Allergen Reactions    Bee Venom Swelling    Azithromycin Diarrhea    Erythromycin      Other reaction(s): GI intolerance, Vomiting    Fentanyl Other (See Comments)     sweating  sweating    Prochlorperazine Fatigue     Other reaction(s): Other (see comments)  Fatigue    Buspirone      Other reaction(s): GI intolerance  vomiting    Erythrocin Nausea and Vomiting    Gluten Meal      Celiac disease    Topamax [Topiramate]      Made her  "lethargic    Zithromax [Azithromycin Dihydrate] Diarrhea    Enbrel [Etanercept] Hives and Rash       Social History     Socioeconomic History    Marital status:     Number of children: 2   Occupational History    Occupation: on short term disability currently - is an    Tobacco Use    Smoking status: Former     Packs/day: 1.00     Years: 5.00     Additional pack years: 0.00     Total pack years: 5.00     Types: Cigarettes     Quit date: 3/20/1998     Years since quittin.9     Passive exposure: Past    Smokeless tobacco: Never   Vaping Use    Vaping Use: Never used   Substance and Sexual Activity    Alcohol use: Not Currently    Drug use: Yes     Types: Marijuana     Comment: vape, edible    Sexual activity: Not Currently     Partners: Male     Birth control/protection: Surgical   Other Topics Concern    Parent/sibling w/ CABG, MI or angioplasty before 65F 55M? No   Social History Narrative    20 y.o- patient's mother   of lung cancer. She had to take care of her younger sister.    2012- patient's  had a heart attack with stents placed, followed by cardiac rehabilitation    2000 TO 2012  was in [facility]    2013 patient's  went through alcohol rehabilitation at [facility] inpatient            They have attended couple counseling a couple of times and patient went to the family program for chemical dependency.    Patient denies alcohol or drug use and herself            Has 2 children, girls ages 17 and 14. Oldest has been a \"trouble maker.\"     For a while she was working 3 jobs since her  was ill. Works at the [employer] for [employer]. Reports her job is very stressful.         Social Determinants of Health     Financial Resource Strain: High Risk (2021)    Overall Financial Resource Strain (CARDIA)     Difficulty of Paying Living Expenses: Very hard   Food " Insecurity: No Food Insecurity (6/28/2021)    Hunger Vital Sign     Worried About Running Out of Food in the Last Year: Never true     Ran Out of Food in the Last Year: Never true   Transportation Needs: No Transportation Needs (6/28/2021)    PRAPARE - Transportation     Lack of Transportation (Medical): No     Lack of Transportation (Non-Medical): No   Stress: Stress Concern Present (6/25/2021)    [employer] - Occupational Stress Questionnaire     Feeling of Stress : Very much   Social Connections: Unknown (6/25/2021)    Social Connection and Isolation Panel [NHANES]     Frequency of Communication with Friends and Family: Not asked     Frequency of Social Gatherings with Friends and Family: Not asked     Attends Temple Services: Not asked     Active Member of Clubs or Organizations: Not asked     Attends Club or Organization Meetings: Not asked     Marital Status:    Interpersonal Safety: Low Risk  (11/28/2023)    Interpersonal Safety     Do you feel physically and emotionally safe where you currently live?: Yes     Within the past 12 months, have you been hit, slapped, kicked or otherwise physically hurt by someone?: No     Within the past 12 months, have you been humiliated or emotionally abused in other ways by your partner or ex-partner?: No       Family History   Problem Relation Age of Onset    Cancer Mother 45        lung    Neurologic Disorder Mother         epilepsy    Lipids Father     Gastrointestinal Disease Father         diverticulitis     Depression Father     Colitis Father     Colon Cancer Father     Diverticulitis Father     Depression Sister     Cancer Maternal Grandmother     Blood Disease Maternal Grandmother         lymphoma     Arthritis Maternal Grandmother     Diabetes Maternal Grandmother     Depression Maternal Grandmother     Macular Degeneration Maternal Grandmother     Glaucoma Maternal Grandmother     Diabetes Maternal Grandfather      "Cerebrovascular Disease Maternal Grandfather     Blood Disease Maternal Grandfather     Heart Disease Maternal Grandfather     Glaucoma Maternal Grandfather     Cancer Paternal Grandmother     Cancer - colorectal Paternal Grandmother     Colitis Paternal Grandmother     Colon Cancer Paternal Grandmother     Diverticulitis Paternal Grandmother     Respiratory Paternal Grandfather         emphysema     Colitis Paternal Grandfather     Colon Cancer Paternal Grandfather     Diverticulitis Paternal Grandfather     Heart Disease Daughter     Asthma Daughter     Melanoma No family hx of     Anesthesia Reaction No family hx of     Clotting Disorder No family hx of        ROS    N/a    PHYSICAL EXAM:  Objective   /80 (BP Location: Right arm, Patient Position: Sitting, Cuff Size: Adult Large)   Pulse 73   Ht 1.6 m (5' 3\")   Wt 86.5 kg (190 lb 12.8 oz)   SpO2 99%   BMI 33.80 kg/m    /80 (BP Location: Right arm, Patient Position: Sitting, Cuff Size: Adult Large)   Pulse 73   Ht 1.6 m (5' 3\")   Wt 86.5 kg (190 lb 12.8 oz)   SpO2 99%   BMI 33.80 kg/m    Body mass index is 33.8 kg/m .  Physical Exam   Small incisions well healed  Palpable visible VH      DISCUSSION OF RISKS:  The risks of hernia repair were reviewed with the patient.    These risks combine the risks of abdominal surgery and the risks of hernia repair, including mesh implantation, and were described to the patient as follows:    Abdominal surgery risks:    These include, but are not limited to, death, myocardial infarction, pneumonia, urinary tract infection, deep venous thrombosis with or without pulmonary embolus, abdominal infection from bowel injury or abscess, bowel obstruction, wound infection, and bleeding.    Hernia repair risks:    Food and Drug Administration Comments on Hernia Repair Surgery and Mesh " Implantation.    http://www.fda.gov/MedicalDevices/ProductsandMedicalProcedures/ImplantsandProsthetics/HerniaSurgicalMesh/default.htm      Hernia Repair Complications    Based on FDA s analysis of medical device adverse event reports and of peer-reviewed, scientific literature, the most common adverse events for all surgical repair of hernias--with or without mesh--are pain, infection, hernia recurrence, scar-like tissue that sticks tissues together (adhesion), blockage of the large or small intestine (obstruction), bleeding, abnormal connection between organs, vessels, or intestines (fistula), fluid build-up at the surgical site (seroma), and a hole in neighboring tissues or organs (perforation).    The most common adverse events following hernia repair with mesh are pain, infection, hernia recurrence, adhesion, and bowel obstruction. Some other potential adverse events that can occur following hernia repair with mesh are mesh migration and mesh shrinkage (contraction).    Many complications related to hernia repair with surgical mesh that have been reported to the FDA have been associated with recalled mesh products that are no longer on the market. Pain, infection, recurrence, adhesion, obstruction, and perforation are the most common complications associated with recalled mesh. In the FDA s analysis of medical adverse event reports to the FDA, recalled mesh products were the main cause of bowel perforation and obstruction complications.    Please refer to the recall notices here and here for more information if you have recalled mesh. For more information on the recalled products, please visit the FDA Medical Device Recall website. Please visit the Medical & Radiation Emitting Device Database to search a specific type of surgical mesh.    If you are unsure about the specific mesh  and brand used in your surgery and have questions about your hernia repair, contact your surgeon or the facility where  your surgery was performed to obtain the information from your medical record.         Again, thank you for allowing me to participate in the care of your patient.      Sincerely,    Arcadio Carrillo MD

## 2024-02-23 NOTE — PROGRESS NOTES
New Hernia Consultation Note      Doretha Yu  [id number]  1975    Requesting Provider: Quinton Ram    Dear Anna Naidu I was asked by Quinton Ram to see this patient for the following problem: Doretha Yu is a 48 year old female who presents to clinic today for the following health issues     CHIEF COMPLAINT:  Abdominal bulge      ASSESSMENT/PLAN:  incisional  Hernia size is less than 5cm in size.    Assessment & Plan   Problem List Items Addressed This Visit    None  Visit Diagnoses       Ventral hernia without obstruction or gangrene    -  Primary    Relevant Orders    Case Request: HERNIORRHAPHY, VENTRAL, ROBOT-ASSISTED (Completed)    CT Abdomen Pelvis w/o Contrast    Adult PAC Visit Referral         Symptomatic ventral hernia after 3 prior [facility] MIS operations  -Will need CT before surgery to asssess size of defect  -Can call [facility] to schedule  -Had thorough discussion re Mesh use in UC patients. May consider Phasix vs permanent based on CT    Arcadio Carrillo MD      30 minutes spent by me on the date of the encounter doing chart review, history and exam, documentation and further activities per the note    HISTORY OF PRESENT ILLNESS:  Location: middle abdomen, likely at prior ileostomy site, cheated R of midline  Severity: Mild     2023- DLI takedown    2023-  .  Laparoscopic robotic-assisted completion proctectomy.  2.  Takedown of end ileostomy  3.  Creation of ileal J-pouch with ileoanal anastomosis   4.  Flexible pouchoscopy/sigmoidoscopy.  5.  Diverting loop ileostomy creation    2021-  TAC, End ileostomy for UC    NUTRITIONAL STATUS:  Lab Results   Component Value Date    ALBUMIN 4.3 12/18/2023    ALBUMIN 3.5 05/18/2022    ALBUMIN 3.1 06/09/2021       Body mass index is 33.8 kg/m .    Patient is not immunosuppressed.    Patient is not a current smoker.    Past Medical History:   Diagnosis Date    Abnormal MRI     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      Anxiety     Basal cell carcinoma     Cervical high risk HPV (human papillomavirus) test positive 2019    See problem list    Colitis     Depression     Diabetes mellitus, iatrogenic (H) 2020    Esophageal reflux     Inflammatory arthritis     Insomnia     Intestinal giardiasis 2018    Lumbago     left lower back pain    Lymphedema     Malignant melanoma (H)     Melanoma (H) 10/23/2017    Migraines     Mild persistent asthma     Morbid obesity with BMI of 40.0-44.9, adult (H)     STORMY (obstructive sleep apnea)     Prothrombin deficiency (H)     takes 81mg asa daily    Stroke (cerebrum) (H)     During     TIA (transient ischemic attack)     Type 2 diabetes mellitus (H)     Ulcerative pancolitis (H)        Patient Active Problem List   Diagnosis    CARDIOVASCULAR SCREENING; LDL GOAL LESS THAN 160    DUB (dysfunctional uterine bleeding)    Anxiety state    Esophageal reflux    Moderate major depression (H)    Mild intermittent asthma without complication    Vision changes    Prothrombin mutation (H24)    Metastatic malignant melanoma (H)    Malignant melanoma of left upper extremity including shoulder (H)    Functional diarrhea    Colitis    Rectal bleeding    Bilateral leg cramps    Rash and nonspecific skin eruption    Other chronic pain    Immunosuppressed status (H24)    Ulcerative colitis with complication, unspecified location (H)    Morbid obesity (H)    Cervical high risk HPV (human papillomavirus) test positive    STORMY (obstructive sleep apnea)    Secondary lymphedema    Chronic neutrophilia    Diabetes mellitus, iatrogenic (H)    High risk HPV infection    Abdominal pain    Anemia    Candidiasis of mouth    Hypocalcemia    Malaise    Menstrual irregularity    Arthritis, rheumatoid (H)    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 difficile colitis    Ulcerative colitis without complications, unspecified location (H)    Colostomy in place (H)    S/P colectomy    Polyarthralgia    Drug-induced polyneuropathy (H24)    Arthralgia of both knees    Adjustment disorder with mixed emotional features    Migraines    Biliary dyskinesia    Gastroesophageal reflux disease without esophagitis    Post-op pain    High output ileostomy (H)    Ileostomy in place (H)       Past Surgical History:   Procedure Laterality Date    APPENDECTOMY      COLONOSCOPY N/A 10/18/2017    Procedure: COLONOSCOPY;  Colon;  Surgeon: Debbie Stephens MD;  [facility]    COLONOSCOPY N/A 2018    Procedure: COMBINED COLONOSCOPY, SINGLE OR MULTIPLE BIOPSY/POLYPECTOMY BY BIOPSY;  colon;  Surgeon: Benita Schumacher MD;  Location: [facility]    COLONOSCOPY      multiple since  to present - about 6 total    DAVINCI ASSISTED TRANSANAL TOTAL MESORECTAL EXCISION N/A 2023    Procedure: COMPLETION PROCTECTOMY, ROBOT-ASSISTED, ILEAL POUCH ANASTAMOSIS;  Surgeon: Quinton Ram MD;  [facility]    DISSECT LYMPH NODE AXILLA Left 10/23/2017    Procedure: DISSECT LYMPH NODE AXILLA;  Left Axillary Lymph Node Dissection ;  Surgeon: Laurent Cool MD;  [facility]    EXAM UNDER ANESTHESIA PELVIC N/A 2020    Procedure: EXAM UNDER ANESTHESIA, PELVIS; with Cervical Biopsies, Vaginal Biopsy and Endocervical Curettings;  Surgeon: Melina Jung MD;  Location: [facility]    GYN SURGERY  ,         LAPAROSCOPIC ASSISTED COLECTOMY N/A 06/15/2021    Procedure: laparoscopic total abdominal colectomy, end ileostomy;  Surgeon: Quinton Ram MD;  [facility]    REPAIR MOHS Left 2017    Procedure: REPAIR MOHS;  Left Upper Lid Moh's Reconstruction;  Surgeon: Kisha Bosch MD;  [facility]    SIGMOIDOSCOPY FLEXIBLE N/A 2023    Procedure: Sigmoidoscopy flexible;   Surgeon: Quinton Ram MD;  [facility]    TAKEDOWN ILEOSTOMY N/A 12/28/2023    Procedure: CLOSURE, ILEOSTOMY;  Surgeon: Quinton Ram MD;  [facility]       MEDICATIONS:  Current Outpatient Medications   Medication    acetaminophen (TYLENOL) 325 MG tablet    acetaminophen (TYLENOL) 500 MG tablet    ARIPiprazole (ABILIFY) 2 MG tablet    cholestyramine (QUESTRAN) 4 g packet    diphenoxylate-atropine (LOMOTIL) 2.5-0.025 MG tablet    hydrOXYzine (ATARAX) 25 MG tablet    medical cannabis (Patient's own supply)    medical cannabis (Patient's own supply)    metFORMIN (GLUCOPHAGE) 500 MG tablet    methocarbamol (ROBAXIN) 500 MG tablet    methylcellulose (CITRUCEL) powder    ondansetron (ZOFRAN ODT) 4 MG ODT tab    Ostomy Supplies MISC    Rectal Protectant-Emollient (CALMOL-4) 76-10 % SUPP    simethicone (MYLICON) 125 MG chewable tablet    venlafaxine (EFFEXOR) 75 MG tablet    oxyCODONE IR (ROXICODONE) 10 MG tablet     No current facility-administered medications for this visit.     Facility-Administered Medications Ordered in Other Visits   Medication    lidocaine 1 % 9 mL    sodium bicarbonate 8.4 % injection 1 mEq       ALLERGIES:  Allergies   Allergen Reactions    Bee Venom Swelling    Azithromycin Diarrhea    Erythromycin      Other reaction(s): GI intolerance, Vomiting    Fentanyl Other (See Comments)     sweating  sweating    Prochlorperazine Fatigue     Other reaction(s): Other (see comments)  Fatigue    Buspirone      Other reaction(s): GI intolerance  vomiting    Erythrocin Nausea and Vomiting    Gluten Meal      Celiac disease    Topamax [Topiramate]      Made her lethargic    Zithromax [Azithromycin Dihydrate] Diarrhea    Enbrel [Etanercept] Hives and Rash       Social History     Socioeconomic History    Marital status:     Number of children: 2   Occupational History    Occupation: on short term disability currently - is an    Tobacco Use     "Smoking status: Former     Packs/day: 1.00     Years: 5.00     Additional pack years: 0.00     Total pack years: 5.00     Types: Cigarettes     Quit date: 3/20/1998     Years since quittin.9     Passive exposure: Past    Smokeless tobacco: Never   Vaping Use    Vaping Use: Never used   Substance and Sexual Activity    Alcohol use: Not Currently    Drug use: Yes     Types: Marijuana     Comment: vape, edible    Sexual activity: Not Currently     Partners: Male     Birth control/protection: Surgical   Other Topics Concern    Parent/sibling w/ CABG, MI or angioplasty before 65F 55M? No   Social History Narrative    20 y.o- patient's mother   of lung cancer. She had to take care of her younger sister.    2012- patient's  had a heart attack with stents placed, followed by cardiac rehabilitation    2000 TO 2012  was in [facility] for depression    2013 patient's  went through alcohol rehabilitation at [facility] inpatient            They have attended couple counseling a couple of times and patient went to the family program for chemical dependency.    Patient denies alcohol or drug use and herself            Has 2 children, girls ages 17 and 14. Oldest has been a \"trouble maker.\"     For a while she was working 3 jobs since her  was ill. 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 Reports her job is very stressful.         Social Determinants of Health     Financial Resource Strain: High Risk (2021)    Overall Financial Resource Strain (CARDIA)     Difficulty of Paying Living Expenses: Very hard   Food Insecurity: No Food Insecurity (2021)    Hunger Vital Sign     Worried About Running Out of Food in the Last Year: Never true     Ran Out of Food in the Last Year: Never true   Transportation Needs: No Transportation Needs (2021)    PRAPARE - Transportation     Lack of Transportation (Medical): No     Lack of " Transportation (Non-Medical): No   Stress: Stress Concern Present (6/25/2021)    [employer] - Occupational Stress Questionnaire     Feeling of Stress : Very much   Social Connections: Unknown (6/25/2021)    Social Connection and Isolation Panel [NHANES]     Frequency of Communication with Friends and Family: Not asked     Frequency of Social Gatherings with Friends and Family: Not asked     Attends L[employer]-day Services: Not asked     Active Member of Clubs or Organizations: Not asked     Attends Club or Organization Meetings: Not asked     Marital Status:    Interpersonal Safety: Low Risk  (11/28/2023)    Interpersonal Safety     Do you feel physically and emotionally safe where you currently live?: Yes     Within the past 12 months, have you been hit, slapped, kicked or otherwise physically hurt by someone?: No     Within the past 12 months, have you been humiliated or emotionally abused in other ways by your partner or ex-partner?: No       Family History   Problem Relation Age of Onset    Cancer Mother 45        lung    Neurologic Disorder Mother         epilepsy    Lipids Father     Gastrointestinal Disease Father         diverticulitis     Depression Father     Colitis Father     Colon Cancer Father     Diverticulitis Father     Depression Sister     Cancer Maternal Grandmother     Blood Disease Maternal Grandmother         lymphoma     Arthritis Maternal Grandmother     Diabetes Maternal Grandmother     Depression Maternal Grandmother     Macular Degeneration Maternal Grandmother     Glaucoma Maternal Grandmother     Diabetes Maternal Grandfather     Cerebrovascular Disease Maternal Grandfather     Blood Disease Maternal Grandfather     Heart Disease Maternal Grandfather     Glaucoma Maternal Grandfather     Cancer Paternal Grandmother     Cancer - colorectal Paternal Grandmother     Colitis Paternal Grandmother     Colon Cancer Paternal Grandmother     Diverticulitis  "Paternal Grandmother     Respiratory Paternal Grandfather         emphysema     Colitis Paternal Grandfather     Colon Cancer Paternal Grandfather     Diverticulitis Paternal Grandfather     Heart Disease Daughter     Asthma Daughter     Melanoma No family hx of     Anesthesia Reaction No family hx of     Clotting Disorder No family hx of        ROS    N/a    PHYSICAL EXAM:  Objective    /80 (BP Location: Right arm, Patient Position: Sitting, Cuff Size: Adult Large)   Pulse 73   Ht 1.6 m (5' 3\")   Wt 86.5 kg (190 lb 12.8 oz)   SpO2 99%   BMI 33.80 kg/m    /80 (BP Location: Right arm, Patient Position: Sitting, Cuff Size: Adult Large)   Pulse 73   Ht 1.6 m (5' 3\")   Wt 86.5 kg (190 lb 12.8 oz)   SpO2 99%   BMI 33.80 kg/m    Body mass index is 33.8 kg/m .  Physical Exam   Small incisions well healed  Palpable visible VH      DISCUSSION OF RISKS:  The risks of hernia repair were reviewed with the patient.    These risks combine the risks of abdominal surgery and the risks of hernia repair, including mesh implantation, and were described to the patient as follows:    Abdominal surgery risks:    These include, but are not limited to, death, myocardial infarction, pneumonia, urinary tract infection, deep venous thrombosis with or without pulmonary embolus, abdominal infection from bowel injury or abscess, bowel obstruction, wound infection, and bleeding.    Hernia repair risks:    Food and Drug Administration Comments on Hernia Repair Surgery and Mesh Implantation.    http://www.fda.gov/MedicalDevices/ProductsandMedicalProcedures/ImplantsandProsthetics/HerniaSurgicalMesh/default.htm      Hernia Repair Complications    Based on FDA s analysis of medical device adverse event reports and of peer-reviewed, scientific literature, the most common adverse events for all surgical repair of hernias--with or without mesh--are pain, infection, hernia recurrence, scar-like tissue that sticks tissues together " (adhesion), blockage of the large or small intestine (obstruction), bleeding, abnormal connection between organs, vessels, or intestines (fistula), fluid build-up at the surgical site (seroma), and a hole in neighboring tissues or organs (perforation).    The most common adverse events following hernia repair with mesh are pain, infection, hernia recurrence, adhesion, and bowel obstruction. Some other potential adverse events that can occur following hernia repair with mesh are mesh migration and mesh shrinkage (contraction).    Many complications related to hernia repair with surgical mesh that have been reported to the FDA have been associated with recalled mesh products that are no longer on the market. Pain, infection, recurrence, adhesion, obstruction, and perforation are the most common complications associated with recalled mesh. In the FDA s analysis of medical adverse event reports to the FDA, recalled mesh products were the main cause of bowel perforation and obstruction complications.    Please refer to the recall notices here and here for more information if you have recalled mesh. For more information on the recalled products, please visit the FDA Medical Device Recall website. Please visit the Medical & Radiation Emitting Device Database to search a specific type of surgical mesh.    If you are unsure about the specific mesh  and brand used in your surgery and have questions about your hernia repair, contact your surgeon or the facility where your surgery was performed to obtain the information from your medical record.         Sincerely,    Arcadio Carrillo MD

## 2024-02-26 ENCOUNTER — OFFICE VISIT (OUTPATIENT)
Dept: FAMILY MEDICINE | Facility: CLINIC | Age: 49
End: 2024-02-26
Payer: COMMERCIAL

## 2024-02-26 VITALS
BODY MASS INDEX: 33.84 KG/M2 | RESPIRATION RATE: 18 BRPM | HEIGHT: 63 IN | HEART RATE: 74 BPM | SYSTOLIC BLOOD PRESSURE: 100 MMHG | DIASTOLIC BLOOD PRESSURE: 64 MMHG | TEMPERATURE: 98.2 F | WEIGHT: 191 LBS | OXYGEN SATURATION: 98 %

## 2024-02-26 DIAGNOSIS — G47.30 SLEEP APNEA, UNSPECIFIED TYPE: ICD-10-CM

## 2024-02-26 DIAGNOSIS — F41.9 ANXIETY: ICD-10-CM

## 2024-02-26 DIAGNOSIS — Z12.31 VISIT FOR SCREENING MAMMOGRAM: ICD-10-CM

## 2024-02-26 DIAGNOSIS — E11.9 TYPE 2 DIABETES MELLITUS WITHOUT COMPLICATION, WITHOUT LONG-TERM CURRENT USE OF INSULIN (H): ICD-10-CM

## 2024-02-26 DIAGNOSIS — E78.5 HYPERLIPIDEMIA LDL GOAL <100: ICD-10-CM

## 2024-02-26 DIAGNOSIS — Z12.4 CERVICAL CANCER SCREENING: ICD-10-CM

## 2024-02-26 DIAGNOSIS — Z00.00 ROUTINE GENERAL MEDICAL EXAMINATION AT A HEALTH CARE FACILITY: Primary | ICD-10-CM

## 2024-02-26 DIAGNOSIS — F32.9 CURRENT EPISODE OF MAJOR DEPRESSIVE DISORDER WITHOUT PRIOR EPISODE, UNSPECIFIED DEPRESSION EPISODE SEVERITY: ICD-10-CM

## 2024-02-26 PROBLEM — E86.0 DEHYDRATION: Status: RESOLVED | Noted: 2021-04-13 | Resolved: 2024-02-26

## 2024-02-26 PROBLEM — A04.72 C. DIFFICILE COLITIS: Status: RESOLVED | Noted: 2021-04-13 | Resolved: 2024-02-26

## 2024-02-26 PROBLEM — K62.5 RECTAL BLEEDING: Status: RESOLVED | Noted: 2018-03-04 | Resolved: 2024-02-26

## 2024-02-26 PROBLEM — E13.9: Chronic | Status: RESOLVED | Noted: 2020-01-28 | Resolved: 2024-02-26

## 2024-02-26 PROBLEM — N92.6 MENSTRUAL IRREGULARITY: Status: RESOLVED | Noted: 2018-05-14 | Resolved: 2024-02-26

## 2024-02-26 PROBLEM — K92.1 HEMATOCHEZIA: Status: RESOLVED | Noted: 2021-04-13 | Resolved: 2024-02-26

## 2024-02-26 PROBLEM — Z93.3 COLOSTOMY IN PLACE (H): Status: RESOLVED | Noted: 2021-07-16 | Resolved: 2024-02-26

## 2024-02-26 PROBLEM — R53.81 MALAISE: Status: RESOLVED | Noted: 2018-05-10 | Resolved: 2024-02-26

## 2024-02-26 PROBLEM — A09 DIARRHEA OF INFECTIOUS ORIGIN: Status: RESOLVED | Noted: 2021-04-13 | Resolved: 2024-02-26

## 2024-02-26 PROBLEM — Z93.2 ILEOSTOMY IN PLACE (H): Status: RESOLVED | Noted: 2023-12-28 | Resolved: 2024-02-26

## 2024-02-26 PROBLEM — B37.0 CANDIDIASIS OF MOUTH: Status: RESOLVED | Noted: 2018-05-10 | Resolved: 2024-02-26

## 2024-02-26 PROCEDURE — 90471 IMMUNIZATION ADMIN: CPT | Performed by: FAMILY MEDICINE

## 2024-02-26 PROCEDURE — 90715 TDAP VACCINE 7 YRS/> IM: CPT | Performed by: FAMILY MEDICINE

## 2024-02-26 PROCEDURE — 99207 PR FOOT EXAM NO CHARGE: CPT | Performed by: FAMILY MEDICINE

## 2024-02-26 PROCEDURE — G0124 SCREEN C/V THIN LAYER BY MD: HCPCS | Performed by: PATHOLOGY

## 2024-02-26 PROCEDURE — 99396 PREV VISIT EST AGE 40-64: CPT | Mod: 24 | Performed by: FAMILY MEDICINE

## 2024-02-26 PROCEDURE — 87624 HPV HI-RISK TYP POOLED RSLT: CPT | Performed by: FAMILY MEDICINE

## 2024-02-26 PROCEDURE — 99214 OFFICE O/P EST MOD 30 MIN: CPT | Mod: 25 | Performed by: FAMILY MEDICINE

## 2024-02-26 PROCEDURE — G0145 SCR C/V CYTO,THINLAYER,RESCR: HCPCS | Performed by: FAMILY MEDICINE

## 2024-02-26 RX ORDER — ARIPIPRAZOLE 2 MG/1
2 TABLET ORAL EVERY MORNING
Qty: 90 TABLET | Refills: 3 | Status: SHIPPED | OUTPATIENT
Start: 2024-02-26 | End: 2024-06-26 | Stop reason: DRUGHIGH

## 2024-02-26 RX ORDER — VENLAFAXINE 75 MG/1
150 TABLET ORAL 2 TIMES DAILY
Qty: 360 TABLET | Refills: 3 | Status: SHIPPED | OUTPATIENT
Start: 2024-02-26

## 2024-02-26 SDOH — HEALTH STABILITY: PHYSICAL HEALTH: ON AVERAGE, HOW MANY DAYS PER WEEK DO YOU ENGAGE IN MODERATE TO STRENUOUS EXERCISE (LIKE A BRISK WALK)?: 1 DAY

## 2024-02-26 ASSESSMENT — ASTHMA QUESTIONNAIRES
ACT_TOTALSCORE: 24
QUESTION_5 LAST FOUR WEEKS HOW WOULD YOU RATE YOUR ASTHMA CONTROL: COMPLETELY CONTROLLED
QUESTION_4 LAST FOUR WEEKS HOW OFTEN HAVE YOU USED YOUR RESCUE INHALER OR NEBULIZER MEDICATION (SUCH AS ALBUTEROL): NOT AT ALL
ACT_TOTALSCORE: 24
QUESTION_3 LAST FOUR WEEKS HOW OFTEN DID YOUR ASTHMA SYMPTOMS (WHEEZING, COUGHING, SHORTNESS OF BREATH, CHEST TIGHTNESS OR PAIN) WAKE YOU UP AT NIGHT OR EARLIER THAN USUAL IN THE MORNING: NOT AT ALL
QUESTION_2 LAST FOUR WEEKS HOW OFTEN HAVE YOU HAD SHORTNESS OF BREATH: ONCE OR TWICE A WEEK
QUESTION_1 LAST FOUR WEEKS HOW MUCH OF THE TIME DID YOUR ASTHMA KEEP YOU FROM GETTING AS MUCH DONE AT WORK, SCHOOL OR AT HOME: NONE OF THE TIME

## 2024-02-26 ASSESSMENT — PATIENT HEALTH QUESTIONNAIRE - PHQ9
10. IF YOU CHECKED OFF ANY PROBLEMS, HOW DIFFICULT HAVE THESE PROBLEMS MADE IT FOR YOU TO DO YOUR WORK, TAKE CARE OF THINGS AT HOME, OR GET ALONG WITH OTHER PEOPLE: NOT DIFFICULT AT ALL
SUM OF ALL RESPONSES TO PHQ QUESTIONS 1-9: 6
SUM OF ALL RESPONSES TO PHQ QUESTIONS 1-9: 6

## 2024-02-26 ASSESSMENT — PAIN SCALES - GENERAL: PAINLEVEL: SEVERE PAIN (6)

## 2024-02-26 ASSESSMENT — SOCIAL DETERMINANTS OF HEALTH (SDOH): HOW OFTEN DO YOU GET TOGETHER WITH FRIENDS OR RELATIVES?: ONCE A WEEK

## 2024-02-26 NOTE — PROGRESS NOTES
"Preventive Care Visit  [facility]  Anna Naidu MD, Family Medicine  Feb 26, 2024    Assessment & Plan     Routine general medical examination at a health care facility       Cervical cancer screening     - Pap Screen with HPV - recommended age 30 - 65 years    Visit for screening mammogram     - MA SCREENING DIGITAL BILAT - Future  (s+30); Future    Anxiety  Feels this is really stable and the abilify has helped significantly.   - ARIPiprazole (ABILIFY) 2 MG tablet; Take 1 tablet (2 mg) by mouth every morning    Current episode of major depressive disorder without prior episode, unspecified depression episode severity  stable  - venlafaxine (EFFEXOR) 75 MG tablet; Take 2 tablets (150 mg) by mouth 2 times daily    Type 2 diabetes mellitus without complication, without long-term current use of insulin (H)  Diet controlled at this time, HgbA1c 5.8% recently but I do feel she would benefit from additional weight loss and improvement in HgbA1c further.   She will start slowly and increase as able, she'll let me know in 1 month how things are going.   - FOOT EXAM  - semaglutide (OZEMPIC) 2 MG/3ML pen; Inject 0.25 mg Subcutaneous every 7 days    Sleep apnea, unspecified type  Sleep apnea mask no longer fits after her weight loss  - Adult Sleep Eval & Management  Referral; Future    Hyperlipidemia LDL goal <100  Declines statin at this time, discussed standard of care and goals of statin  Recheck lipids this summer after more weight loss.     Patient has been advised of split billing requirements and indicates understanding: Yes          BMI  Estimated body mass index is 33.83 kg/m  as calculated from the following:    Height as of this encounter: 1.6 m (5' 3\").    Weight as of this encounter: 86.6 kg (191 lb).       Counseling  Appropriate preventive services were discussed with this patient, including applicable screening as appropriate for fall prevention, nutrition, physical " activity, Tobacco-use cessation, weight loss and cognition.  Checklist reviewing preventive services available has been given to the patient.  Reviewed patient's diet, addressing concerns and/or questions.   She is at risk for lack of exercise and has been provided with information to increase physical activity for the benefit of her well-being.   The patient was instructed to see the dentist every 6 months.   The patient's PHQ-9 score is consistent with mild depression. She was provided with information regarding depression.           Lance Coyne is a 48 year old, presenting for the following:  Physical        2/26/2024     2:52 PM   Additional Questions   Roomed by Ruth Ann raymond CMA   Accompanied by Self        Via the Health Maintenance questionnaire, the patient has reported the following services have been completed -Eye Exam, this information has been sent to the abstraction team.  Health Care Directive  Patient has a Health Care Directive on file  Advance care planning document is on file but is outdated.  Patient encouraged to updated.    HPI        2/26/2024   General Health   How would you rate your overall physical health? Good   Feel stress (tense, anxious, or unable to sleep) Rather much   (!) STRESS CONCERN      2/26/2024   Nutrition   Three or more servings of calcium each day? Yes   Diet: Gluten-free/reduced   How many servings of fruit and vegetables per day? (!) 2-3   How many sweetened beverages each day? 0-1         2/26/2024   Exercise   Days per week of moderate/strenous exercise 1 day   (!) EXERCISE CONCERN      2/26/2024   Social Factors   Frequency of gathering with friends or relatives Once a week   Worry food won't last until get money to buy more No   Food not last or not have enough money for food? No   Do you have housing?  Yes   Are you worried about losing your housing? No   Lack of transportation? No   Unable to get utilities (heat,electricity)? Patient declined         2/26/2024    Dental   Dentist two times every year? (!) NO          Today's PHQ-9 Score:       2024     2:47 PM   PHQ-9 SCORE   PHQ-9 Total Score MyChart 6 (Mild depression)   PHQ-9 Total Score 6         2024   Substance Use   Alcohol more than 3/day or more than 7/wk Not Applicable   Do you use any other substances recreationally? No     Social History     Tobacco Use    Smoking status: Former     Packs/day: 1.00     Years: 5.00     Additional pack years: 0.00     Total pack years: 5.00     Types: Cigarettes     Quit date: 3/20/1998     Years since quittin.9     Passive exposure: Past    Smokeless tobacco: Never   Vaping Use    Vaping Use: Never used   Substance Use Topics    Alcohol use: Not Currently    Drug use: Yes     Types: Marijuana     Comment: vape, edible            No data to display                 Mammogram Screening - Mammogram every 1-2 years updated in Health Maintenance based on mutual decision making        2024   STI Screening   New sexual partner(s) since last STI/HIV test? No     History of abnormal Pap smear: YES - updated in Problem List and Health Maintenance accordingly        Latest Ref Rng & Units 2021    10:22 AM 2019    11:50 AM 2019    11:40 AM   PAP / HPV   PAP  Negative for Intraepithelial Lesion or Malignancy (NILM)      PAP (Historical)    NIL    HPV 16 DNA Negative Negative  Positive     HPV 18 DNA Negative Negative  Negative     Other HR HPV Negative Negative  Negative       ASCVD Risk   The 10-year ASCVD risk score (Cooper CANTRELL, et al., 2019) is: 2.7%    Values used to calculate the score:      Age: 48 years      Sex: Female      Is Non- : No      Diabetic: Yes      Tobacco smoker: No      Systolic Blood Pressure: 100 mmHg      Is BP treated: No      HDL Cholesterol: 28 mg/dL      Total Cholesterol: 186 mg/dL        2024   Contraception/Family Planning   Questions about contraception or family planning No       "  Reviewed and updated as needed this visit by Provider                          Review of Systems  Constitutional, HEENT, cardiovascular, pulmonary, gi and gu systems are negative, except as otherwise noted.     Objective    Exam  /64   Pulse 74   Temp 98.2  F (36.8  C) (Tympanic)   Resp 18   Ht 1.6 m (5' 3\")   Wt 86.6 kg (191 lb)   LMP 01/26/2024 (Approximate)   SpO2 98%   BMI 33.83 kg/m     Estimated body mass index is 33.83 kg/m  as calculated from the following:    Height as of this encounter: 1.6 m (5' 3\").    Weight as of this encounter: 86.6 kg (191 lb).    Physical Exam  GENERAL: alert and no distress  NECK: no adenopathy, no asymmetry, masses, or scars  RESP: lungs clear to auscultation - no rales, rhonchi or wheezes  CV: regular rate and rhythm, normal S1 S2, no S3 or S4, no murmur, click or rub, no peripheral edema  ABDOMEN: soft, nontender, no hepatosplenomegaly, no masses and bowel sounds normal   (female): normal female external genitalia, normal urethral meatus , normal vaginal mucosa, vaginal discharge - bloody, and normal cervix, adnexae, and uterus without masses.  MS: no gross musculoskeletal defects noted, no edema  SKIN: no suspicious lesions or rashes  PSYCH: mentation appears normal, affect normal/bright      Signed Electronically by: Anna Naidu MD    "

## 2024-02-26 NOTE — PATIENT INSTRUCTIONS
Preventive Care Advice   This is general advice given by our system to help you stay healthy. However, your care team may have specific advice just for you. Please talk to your care team about your preventive care needs.  Nutrition  Eat 5 or more servings of fruits and vegetables each day.  Try wheat bread, brown rice and whole grain pasta (instead of white bread, rice, and pasta).  Get enough calcium and vitamin D. Check the label on foods and aim for 100% of the RDA (recommended daily allowance).  Lifestyle  Exercise at least 150 minutes each week   (30 minutes a day, 5 days a week).  Do muscle strengthening activities 2 days a week. These help control your weight and prevent disease.  No smoking.  Wear sunscreen to prevent skin cancer.  Have a dental exam and cleaning every 6 months.  Yearly exams  See your health care team every year to talk about:  Any changes in your health.  Any medicines your care team has prescribed.  Preventive care, family planning, and ways to prevent chronic diseases.  Shots (vaccines)   HPV shots (up to age 26), if you've never had them before.  Hepatitis B shots (up to age 59), if you've never had them before.  COVID-19 shot: Get this shot when it's due.  Flu shot: Get a flu shot every year.  Tetanus shot: Get a tetanus shot every 10 years.  Pneumococcal, hepatitis A, and RSV shots: Ask your care team if you need these based on your risk.  Shingles shot (for age 50 and up).  General health tests  Diabetes screening:  Starting at age 35, Get screened for diabetes at least every 3 years.  If you are younger than age 35, ask your care team if you should be screened for diabetes.  Cholesterol test: At age 39, start having a cholesterol test every 5 years, or more often if advised.  Bone density scan (DEXA): At age 50, ask your care team if you should have this scan for osteoporosis (brittle bones).  Hepatitis C: Get tested at least once in your life.  STIs (sexually transmitted  infections)  Before age 24: Ask your care team if you should be screened for STIs.  After age 24: Get screened for STIs if you're at risk. You are at risk for STIs (including HIV) if:  You are sexually active with more than one person.  You don't use condoms every time.  You or a partner was diagnosed with a sexually transmitted infection.  If you are at risk for HIV, ask about PrEP medicine to prevent HIV.  Get tested for HIV at least once in your life, whether you are at risk for HIV or not.  Cancer screening tests  Cervical cancer screening: If you have a cervix, begin getting regular cervical cancer screening tests at age 21. Most people who have regular screenings with normal results can stop after age 65. Talk about this with your provider.  Breast cancer scan (mammogram): If you've ever had breasts, begin having regular mammograms starting at age 40. This is a scan to check for breast cancer.  Colon cancer screening: It is important to start screening for colon cancer at age 45.  Have a colonoscopy test every 10 years (or more often if you're at risk) Or, ask your provider about stool tests like a FIT test every year or Cologuard test every 3 years.  To learn more about your testing options, visit: [email or web address]://[email or web address]/617924.pdf.  For help making a decision, visit: https://bit.ly/oi67256.  Prostate cancer screening test: If you have a prostate and are age 55 to 69, ask your provider if you would benefit from a yearly prostate cancer screening test.  Lung cancer screening: If you are a current or former smoker age 50 to 80, ask your care team if ongoing lung cancer screenings are right for you.  For informational purposes only. Not to replace the advice of your health care provider. Copyright   2023 [employer]. All rights reserved. Clinically reviewed by the [facility] Transitions Program. "[employer]" 065402 - REV 01/24.

## 2024-02-27 ENCOUNTER — TELEPHONE (OUTPATIENT)
Dept: FAMILY MEDICINE | Facility: CLINIC | Age: 49
End: 2024-02-27
Payer: COMMERCIAL

## 2024-02-27 NOTE — TELEPHONE ENCOUNTER
Ozempic 0.25 or 0.5mg  ID-[id number]  [facility]  [phone]    Thank you,  Sonya Johnson, Pharmacy Technician   [facility]

## 2024-03-01 LAB
BKR LAB AP GYN ADEQUACY: ABNORMAL
BKR LAB AP GYN INTERPRETATION: ABNORMAL
BKR LAB AP GYN OTHER FINDINGS: ABNORMAL
BKR LAB AP HPV REFLEX: ABNORMAL
BKR LAB AP PREVIOUS ABNORMAL: ABNORMAL
PATH REPORT.COMMENTS IMP SPEC: ABNORMAL
PATH REPORT.COMMENTS IMP SPEC: ABNORMAL
PATH REPORT.RELEVANT HX SPEC: ABNORMAL

## 2024-03-11 ENCOUNTER — HOSPITAL ENCOUNTER (OUTPATIENT)
Dept: CT IMAGING | Facility: CLINIC | Age: 49
Discharge: HOME | End: 2024-03-11
Attending: SURGERY | Admitting: SURGERY
Payer: COMMERCIAL

## 2024-03-11 DIAGNOSIS — K43.9 VENTRAL HERNIA WITHOUT OBSTRUCTION OR GANGRENE: ICD-10-CM

## 2024-03-11 PROCEDURE — 74176 CT ABD & PELVIS W/O CONTRAST: CPT

## 2024-03-11 NOTE — TELEPHONE ENCOUNTER
Retail Pharmacy Prior Authorization Team   Phone: [phone]    PA Initiation    Medication: OZEMPIC (0.25 OR 0.5 MG/DOSE) 2 [facility]  Insurance Company: [employer] P[employer] - Phone [phone] Fax [phone]  Pharmacy Filling the Rx: [facility] [place], [place] - [address]  Filling Pharmacy Phone:    Filling Pharmacy Fax:    Start Date: 3/11/2024

## 2024-03-12 NOTE — TELEPHONE ENCOUNTER
PRIOR AUTHORIZATION DENIED    Medication: OZEMPIC (0.25 OR 0.5 MG/DOSE) [facility]  Insurance Company: [employer]AP - Phone [phone] Fax [phone]  Denial Date: 3/11/2024  Denial Reason(s):           Appeal Information:

## 2024-03-13 ENCOUNTER — TELEPHONE (OUTPATIENT)
Dept: FAMILY MEDICINE | Facility: CLINIC | Age: 49
End: 2024-03-13
Payer: COMMERCIAL

## 2024-03-13 DIAGNOSIS — N83.201 RIGHT OVARIAN CYST: Primary | ICD-10-CM

## 2024-03-13 NOTE — TELEPHONE ENCOUNTER
Notify patient of prior authorization denial.  Does she want me to order one of the medications listed?  None of them specifically help with weight loss and her diabetes control does not need additional medication at this time.    Anna Naidu M.D.

## 2024-03-13 NOTE — TELEPHONE ENCOUNTER
Pt would benefit from Inpatient Rehabilitation placement at discharge from facility and requires no DME at discharge.   Pt desires Inpatient Rehabilitation placement at discharge. Pt cooperative; agreeable to therapeutic recommendations and plan of care.    MEENA for pt to call back for message from MD

## 2024-03-13 NOTE — TELEPHONE ENCOUNTER
Called patient and informed her of Trang Lobo's instructions.    Patient expressed understanding and will schedule the ultrasound.    Rosemary Borden RN on 3/13/2024 at 4:20 PM

## 2024-03-13 NOTE — TELEPHONE ENCOUNTER
"Pt notified,  Pt wants PA to be \"Run through\" her other insurance [employer]. Please call patient and advise.      "

## 2024-03-13 NOTE — TELEPHONE ENCOUNTER
I have placed order to do an Ultrasound for further evaluation of the cyst found on CT scan.  This is likely not related to findings on recent pap smear.  Call to schedule U/S at [phone] and will be notified of those results when available.  Notify if any further questions or concerns.  Covering for PCP/ordering provider.  SONDRA Torres

## 2024-03-13 NOTE — TELEPHONE ENCOUNTER
Test Results    Contacts         Type Contact Phone/Fax    03/13/2024 11:49 AM CDT Phone (Incoming) Doretha Yu (Self) [phone] (M)          Who ordered the test:  Surgeon who Dr. Naidu referred her to.  Pt had CT done 3/11 and there was an abnormal finding of a hemorrhagic cyst.  In addition, pt states that abnormal cells were found in her recent PAP smear.  Pt wants to know if the 2 things could be related  and if further testing should be done?  Please call patient and advise.      Type of test: Lab and CT    Date of test:  3/11    Where was the test performed:  [facility]    What are your questions/concerns?:  See above    Could we send this information to you in [email or web address] or would you prefer to receive a phone call?:   Patient would prefer a phone call   Okay to leave a detailed message?: Yes at Cell number on file:    Telephone Information:   Mobile [phone]

## 2024-03-15 NOTE — TELEPHONE ENCOUNTER
Action 03/15/24 MMT   Action Taken  Internal referral, patient well established with clinic. No outside records.

## 2024-03-18 ENCOUNTER — TELEPHONE (OUTPATIENT)
Dept: FAMILY MEDICINE | Facility: CLINIC | Age: 49
End: 2024-03-18
Payer: COMMERCIAL

## 2024-03-18 DIAGNOSIS — E11.9 TYPE 2 DIABETES MELLITUS WITHOUT COMPLICATION, WITHOUT LONG-TERM CURRENT USE OF INSULIN (H): ICD-10-CM

## 2024-03-18 NOTE — TELEPHONE ENCOUNTER
Patient now has [employer] commercial ins and would like to see if the pa would be approved through them. [facility] Pmap was already denied.    Prior Authorization Retail Medication Request    Medication/Dose: Ozempic 0.25  Diagnosis and ICD code (if different than what is on RX):  na  New/renewal/insurance change PA/secondary ins. PA:  Previously Tried and Failed:  na  Rationale:  na    Insurance   Primary: [employer] insurance  Insurance ID:  [id number]    Secondary (if applicable):[facility] [employer]  Insurance ID:  [id number]    Pharmacy Information (if different than what is on RX)  Name:  [facility]  Phone:  [phone]  Fax:[phone]

## 2024-03-19 ENCOUNTER — HOSPITAL ENCOUNTER (OUTPATIENT)
Dept: ULTRASOUND IMAGING | Facility: CLINIC | Age: 49
Discharge: HOME | End: 2024-03-19
Attending: NURSE PRACTITIONER | Admitting: NURSE PRACTITIONER
Payer: COMMERCIAL

## 2024-03-19 DIAGNOSIS — N83.201 RIGHT OVARIAN CYST: ICD-10-CM

## 2024-03-19 PROCEDURE — 76856 US EXAM PELVIC COMPLETE: CPT

## 2024-03-20 ENCOUNTER — TELEPHONE (OUTPATIENT)
Dept: SURGERY | Facility: CLINIC | Age: 49
End: 2024-03-20
Payer: COMMERCIAL

## 2024-03-20 NOTE — TELEPHONE ENCOUNTER
Left voicemail for patient regarding scheduling surgery with Dr. Carrillo.    Provided contact number to discuss.    P: [phone]    __    Sylvia Keenan, on 3/20/2024 at 2:33 PM

## 2024-03-24 DIAGNOSIS — E13.9 DIABETES MELLITUS, IATROGENIC (H): ICD-10-CM

## 2024-03-25 NOTE — TELEPHONE ENCOUNTER
Requested Prescriptions   Pending Prescriptions Disp Refills    metFORMIN (GLUCOPHAGE) 500 MG tablet [Pharmacy Med Name: METFORMIN HCL 500MG TABS] 360 tablet 1     Sig: TAKE 2 TABLETS BY MOUTH 2 TIMES DAILY WITH MEALS       Biguanide Agents Failed - 3/24/2024 11:43 AM        Failed - Medication indicated for associated diagnosis     Medication is associated with one or more of the following diagnoses:     Gestational diabetes mellitus     Hyperinsulinar obesity     Hypersecretion of ovarian androgens    Non-alcoholic fatty liver    Polycystic ovarian syndrome               Pre-diabetes (DM 2 prevention)    Type 2 diabetes mellitus     Weight gain, antipsychotic therapy-induced             Passed - Patient is age 10 or older        Passed - Patient has documented A1c within the specified period of time.     If HgbA1C is 8 or greater, it needs to be on file within the past 3 months.  If less than 8, must be on file within the past 6 months.     Recent Labs   Lab Test 01/29/24  1252   A1C 5.8*             Passed - Patient does NOT have a diagnosis of CHF.        Passed - Medication is active on med list        Passed - Has GFR on file in past 12 months and most recent value is normal        Passed - Recent (6 mo) or future (90 days) visit within the authorizing provider's specialty     The patient must have completed an in-person or virtual visit within the past 6 months or has a future visit scheduled within the next 90 days with the authorizing provider s specialty.  Urgent care and e-visits do not quality as an office visit for this protocol.          Passed - Patient is not pregnant        Passed - Patient has not had a positive pregnancy test within the past 12 mos.

## 2024-03-25 NOTE — TELEPHONE ENCOUNTER
New telephone encounter was created and routed to the pool for the new insurance. Please see that encounter for updates on PA under [employer].

## 2024-03-26 ENCOUNTER — PRE VISIT (OUTPATIENT)
Dept: GASTROENTEROLOGY | Facility: CLINIC | Age: 49
End: 2024-03-26

## 2024-03-26 ENCOUNTER — TELEPHONE (OUTPATIENT)
Dept: GASTROENTEROLOGY | Facility: CLINIC | Age: 49
End: 2024-03-26

## 2024-03-26 ENCOUNTER — VIRTUAL VISIT (OUTPATIENT)
Dept: GASTROENTEROLOGY | Facility: CLINIC | Age: 49
End: 2024-03-26
Payer: COMMERCIAL

## 2024-03-26 DIAGNOSIS — Z90.49 S/P COLECTOMY: ICD-10-CM

## 2024-03-26 DIAGNOSIS — R11.10 REGURGITATION OF FOOD: ICD-10-CM

## 2024-03-26 DIAGNOSIS — R79.89 ELEVATED LFTS: ICD-10-CM

## 2024-03-26 DIAGNOSIS — K51.90 ULCERATIVE COLITIS WITHOUT COMPLICATIONS, UNSPECIFIED LOCATION (H): Primary | ICD-10-CM

## 2024-03-26 PROCEDURE — 99215 OFFICE O/P EST HI 40 MIN: CPT | Mod: 95 | Performed by: INTERNAL MEDICINE

## 2024-03-26 RX ORDER — OMEPRAZOLE 40 MG/1
40 CAPSULE, DELAYED RELEASE ORAL DAILY
Qty: 60 CAPSULE | Refills: 1 | Status: SHIPPED | OUTPATIENT
Start: 2024-03-26

## 2024-03-26 NOTE — TELEPHONE ENCOUNTER
Due to the snow, Writer is reaching out to offer Pt the option to change Pt's visit on 3/26/2024 at 2:20 PM with Dr. Renard Davey to a virtual visit. Writer called and talked with Pt. Pt accepted a virtual visit.

## 2024-03-26 NOTE — NURSING NOTE
Is the patient currently in the state of [place]? YES    Visit mode:VIDEO    If the visit is dropped, the patient can be reconnected by: VIDEO VISIT: Text to cell phone:   Telephone Information:   Mobile [phone]       Will anyone else be joining the visit? NO  (If patient encounters technical issues they should call [phone]    How would you like to obtain your AVS? MyChart    Are changes needed to the allergy or medication list? No    Reason for visit: Consult    Edward MANE

## 2024-03-26 NOTE — LETTER
3/26/2024         RE: Doretha Yu  [id number]  [facility] [address]        Dear Colleague,    Thank you for referring your patient, Doretha Yu, to the [facility] [place]. Please see a copy of my visit note below.    IBD CLINIC VISIT     CC/REFERRING MD:  No ref. provider found    REASON FOR CONSULTATION: follow up UC s/p colectomy and J pouch formation    ASSESSMENT/PLAN    1. Ulcerative colitis now s/p IPAA with recent ostomy take down:     Currently she continues to gradually improve and is overall doing well. Her number of BMs are quite well managed with titration of imodium and lomotil. Her main complaint now is the sensation of incomplete defecation. She has been unable to wean off lomotil (causes significant diarrhea).     -Will try going down to 2 pills imodium and 1 pill lomotil BID.   -Start metamucil 1/2 tablespoon and titrate up  -Check pouchoscopy for objective evidence of pouchitis.   -Referral to pelvic floor center for evaluation of pelvic floor dysfunction.    2. IBD dysplasia surveillance:   not technically due until 2029    3. Regurgitation of food: follow symptoms. Most likely GERD. Obtain EGD at time of pouchoscopy.     4. Elevated LFTs: Mainly improved. Only mild residual alk phos elevation. May be a component of fatty liver. Hepatitis serologies, ISAEL, AMA, A1AT, celiac, SPEP, f-actin, MRCP are all normal.    -no more than 2000 mg of acetaminophen daily  -recheck LFTs    Return to clinic in 6 months with Beti Issa and 12 months with Dr. Davey    Thank you for this consultation.  It was a pleasure to participate in the care of this patient; please contact us with any further questions.  I spent a total of 45 minutes during the day of encounter performed chart review, meeting with patient, patient counseling, care coordination, and documentation.        This note was created with voice recognition software, and while reviewed for accuracy,  typos may remain.     Renard Davey MD  Inflammatory Bowel Disease Program   Division of Gastroenterology, Hepatology and Nutrition  [facility]        IBD HISTORY  Age at diagnosis: 2021  Extent of disease: proctosigmoid   Current UC medications: None  Prior UC surgeries:  total abdominal colectomy and end ileostomy 6/15/21   Prior IBD Medications:   Infliximab, 1 dose in the hospital good response  Vedolizumab, completed induction dosing  Prednisone, minimal response to high-dose oral and IV with significant weight gain  5-ASA, no response  Topical therapies to include enemas, no response    DRUG MONITORING  TPMT enzyme activity:     6-TGN/6-MMPN levels:    Biologic concentration:         DISEASE ASSESSMENT    Endoscopic assessment:   EGD 2/14/23 ([place])  Post-op Diagnoses:        - LA Grade A reflux esophagitis.        - Erythematous mucosa in the prepyloric region of the stomach. Biopsied.        - A few fundic gland polyps.        - A single non-bleeding angioectasia in the duodenum (posterior wall).        - Biopsies were taken with a cold forceps for evaluation of celiac        disease.        - No clear cause of abdominal pain identified. No evidence of recurrent        melanoma.      Surgical Path:  Colon path 6/15/23:  FINAL DIAGNOSIS:   COLON, RESECTION:   - Moderate chronic active colitis with crypt abscess formation ([place]   grade 3)   - Negative for dysplasia and malignancy   - Viable surgical margins   - Two benign lymph nodes (0/2)      Surgical Pathology (9/5/2023):  A. ILEUM, ILEOSTOMY TRIM;  Ileo-cutaneous anastomotic junction with nonspecific mucosa inflammation, congestion, and other features consistent with ileostomy; no dysplasia or malignancy  B. RECTUM, COMPLETION PROCTECTOMY:  Mildly active chronic proctitis with:   -Neutrophilic cryptitis, mucosal atrophy and prominent reactive lymphoid hyperplasias  -Reactive mesorectal lymph nodes;  no dysplasia or malignancy  C.  COLON, ANASTOMOTIC RINGS; EXCISION:   -Portions of small intestinal mucosa and wall with no morphologic abnormalities   -Portion of rectal mucosa and wall with chronic proctitis; no dysplasia or malignancy      Enterography:   -- MRE 12/27/2022 no findings for acute inflammation of the bowel     Fecal calprotectin: --     C diff: negative 10/26/23, negative 1/12/23,  was positive 3/2021 treated with vanco taper (at [place])     HPI:   Currently, here for routine follow up.    Overall she is doing much better than December.    She had her ileostomy takedown in December and has been steadily improving since then. Has followed with [facility] post-op. Did have an episode of presumed pouchitis that was empirically treated with abx and this did help.    She has titrated anti-diarrheal meds with pretty good results. She takes 3 imodium and 1 lomotil BID. With this regimen she has 2-3 BMs in the day and 2 at night.     She does complain of some incomplete defecation. She is not taking any fiber. She denies any blood.    She complains of some nausea and regurgitation of food contents. She feels this happens when she needs to have a BM and also can be 30 min after eating.    She does have a known hernia at her prior ostomy site. She is seeing Dr. Arcadio Carrillo to discuss repair.    She continues to have pain of fibromyalgia in her upper extremities - she follows with rheumatology at [facility] and has an appointment with them tomorrow. She is taking acetaminophen 2000 mg daily. Denies NSAIDs.        Extra intestinal manifestations of IBD:  No uveitis/episcleritis  No aphthous ulcers   No arthritis   No erythema nodosum/pyoderma gangrenosum.     sIBDQ:  IBDQ Score Date IBDQ - Total Score  (Higher score better)   3/26/2024   2:01 PM 39   4/10/2018   7:20 AM 32          ROS:    Constitutional, HEENT, cardiovascular, pulmonary, GI, , musculoskeletal, neuro, skin, endocrine and psych systems are negative, except as otherwise  noted.    PHYSICAL EXAMINATION:  Constitutional: aaox3, cooperative, pleasant, not dyspneic/diaphoretic, no acute distress  Vitals reviewed: LMP 2024 (Approximate)   Wt:   Wt Readings from Last 2 Encounters:   24 86.6 kg (191 lb)   24 86.5 kg (190 lb 12.8 oz)      Eyes: Sclera anicteric/injected  Respiratory: Unlabored breathing  Skin: no jaundice  Psych: Normal affect    PERTINENT PAST MEDICAL HISTORY:  Past Medical History:   Diagnosis Date    Abnormal MRI     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     Anxiety     Basal cell carcinoma     Cervical high risk HPV (human papillomavirus) test positive 2019    See problem list    Colitis     Depression     Diabetes mellitus, iatrogenic (H) 2020    Esophageal reflux     Inflammatory arthritis     Insomnia     Intestinal giardiasis 2018    Lumbago     left lower back pain    Lymphedema     Malignant melanoma (H)     Melanoma (H) 10/23/2017    Migraines     Mild persistent asthma     Morbid obesity with BMI of 40.0-44.9, adult (H)     STORMY (obstructive sleep apnea)     Prothrombin deficiency (H)     takes 81mg asa daily    Stroke (cerebrum) (H)     During     TIA (transient ischemic attack)     Type 2 diabetes mellitus (H)     Ulcerative pancolitis (H)        PREVIOUS SURGERIES:  Past Surgical History:   Procedure Laterality Date    APPENDECTOMY      COLONOSCOPY N/A 10/18/2017    Procedure: COLONOSCOPY;  Colon;  Surgeon: Debbie Stephens MD;  [facility]    COLONOSCOPY N/A 2018    Procedure: COMBINED COLONOSCOPY, SINGLE OR MULTIPLE BIOPSY/POLYPECTOMY BY BIOPSY;  colon;  Surgeon: Benita Schumacher MD;  Location: [facility]    COLONOSCOPY      multiple since  to present - about 6 total    DAVINCI ASSISTED TRANSANAL TOTAL MESORECTAL EXCISION N/A 2023    Procedure: COMPLETION PROCTECTOMY, ROBOT-ASSISTED, ILEAL POUCH ANASTAMOSIS;  Surgeon: Quinton Ram MD;  [facility]     DISSECT LYMPH NODE AXILLA Left 10/23/2017    Procedure: DISSECT LYMPH NODE AXILLA;  Left Axillary Lymph Node Dissection ;  Surgeon: Laurent Cool MD;  [facility]    EXAM UNDER ANESTHESIA PELVIC N/A 2020    Procedure: EXAM UNDER ANESTHESIA, PELVIS; with Cervical Biopsies, Vaginal Biopsy and Endocervical Curettings;  Surgeon: Melina Jung MD;  Location: [facility]    GYN SURGERY  ,         LAPAROSCOPIC ASSISTED COLECTOMY N/A 06/15/2021    Procedure: laparoscopic total abdominal colectomy, end ileostomy;  Surgeon: Quinton Ram MD;  [facility]    REPAIR MOHS Left 2017    Procedure: REPAIR MOHS;  Left Upper Lid Moh's Reconstruction;  Surgeon: Kisha Bosch MD;  [facility]    SIGMOIDOSCOPY FLEXIBLE N/A 2023    Procedure: Sigmoidoscopy flexible;  Surgeon: Quinton Ram MD;  [facility]    TAKEDOWN ILEOSTOMY N/A 2023    Procedure: CLOSURE, ILEOSTOMY;  Surgeon: Quinton Ram MD;  [facility]       ALLERGIES:     Allergies   Allergen Reactions    Bee Venom Swelling    Azithromycin Diarrhea    Erythromycin      Other reaction(s): GI intolerance, Vomiting    Fentanyl Other (See Comments)     sweating  sweating    Prochlorperazine Fatigue     Other reaction(s): Other (see comments)  Fatigue    Buspirone      Other reaction(s): GI intolerance  vomiting    Erythrocin Nausea and Vomiting    Gluten Meal      Celiac disease    Topamax [Topiramate]      Made her lethargic    Zithromax [Azithromycin Dihydrate] Diarrhea    Enbrel [Etanercept] Hives and Rash       PERTINENT MEDICATIONS:    Current Outpatient Medications:     acetaminophen (TYLENOL) 325 MG tablet, Take 3 tablets (975 mg) by mouth 3 times daily, Disp: , Rfl:     acetaminophen (TYLENOL) 500 MG tablet, Take 1,000 mg by mouth every 6 hours as needed for mild pain, Disp: , Rfl:     ARIPiprazole (ABILIFY) 2 MG tablet, Take 1 tablet (2 mg) by mouth every morning, Disp: 90  tablet, Rfl: 3    cholestyramine (QUESTRAN) 4 g packet, Take 1 packet (4 g) by mouth 2 times daily (with meals), Disp: 60 packet, Rfl: 1    diphenoxylate-atropine (LOMOTIL) 2.5-0.025 MG tablet, Take 2 tablets by mouth 2 times daily as needed for diarrhea, Disp: 240 tablet, Rfl: 5    hydrOXYzine (ATARAX) 25 MG tablet, Take 1 tablet (25 mg) by mouth 2 times daily (Patient taking differently: Take 25 mg by mouth 2 times daily as needed), Disp: 60 tablet, Rfl: 1    medical cannabis (Patient's own supply), , Disp: , Rfl:     medical cannabis (Patient's own supply), See Admin Instructions (The purpose of this order is to document that the patient reports taking medical cannabis.  This is not a prescription, and is not used to certify that the patient has a qualifying medical condition.), Disp: , Rfl:     metFORMIN (GLUCOPHAGE) 500 MG tablet, TAKE 2 TABLETS BY MOUTH 2 TIMES DAILY WITH MEALS, Disp: 360 tablet, Rfl: 1    methocarbamol (ROBAXIN) 500 MG tablet, Take 1 tablet (500 mg) by mouth 4 times daily as needed for muscle spasms, Disp: 56 tablet, Rfl: 0    methylcellulose (CITRUCEL) powder, Take 0.55 g (1.925 teaspoonful) by mouth daily, Disp: 454 g, Rfl: 3    ondansetron (ZOFRAN ODT) 4 MG ODT tab, DISSOLVE ONE TABLET BY MOUTH EVERY 8 HOURS AS NEEDED FOR NAUSEA., Disp: 20 tablet, Rfl: 1    Rectal Protectant-Emollient (CALMOL-4) 76-10 % SUPP, Place 1 suppository rectally 2 times daily as needed (anal irritation), Disp: 24 suppository, Rfl: 11    semaglutide (OZEMPIC) 2 MG/3ML pen, Inject 0.25 mg Subcutaneous every 7 days, Disp: 3 mL, Rfl: 0    simethicone (MYLICON) 125 MG chewable tablet, Take 1 tablet (125 mg) by mouth 2 times daily as needed for intestinal gas, Disp: 10 tablet, Rfl: 0    venlafaxine (EFFEXOR) 75 MG tablet, Take 2 tablets (150 mg) by mouth 2 times daily, Disp: 360 tablet, Rfl: 3  No current facility-administered medications for this visit.    Facility-Administered Medications Ordered in Other Visits:      "lidocaine 1 % 9 mL, 9 mL, Intradermal, Once, Anna Naidu MD    sodium bicarbonate 8.4 % injection 1 mEq, 1 mEq, Intradermal, Once, Anna Naidu MD    SOCIAL HISTORY:  Social History     Socioeconomic History    Marital status:      Spouse name: Not on file    Number of children: 2    Years of education: Not on file    Highest education level: Not on file   Occupational History    Occupation: on short term disability currently - is an    Tobacco Use    Smoking status: Former     Packs/day: 1.00     Years: 5.00     Additional pack years: 0.00     Total pack years: 5.00     Types: Cigarettes     Quit date: 3/20/1998     Years since quittin.0     Passive exposure: Past    Smokeless tobacco: Never   Vaping Use    Vaping Use: Never used   Substance and Sexual Activity    Alcohol use: Not Currently    Drug use: Yes     Types: Marijuana     Comment: vape, edible    Sexual activity: Not Currently     Partners: Male     Birth control/protection: Surgical   Other Topics Concern    Parent/sibling w/ CABG, MI or angioplasty before 65F 55M? No   Social History Narrative    20 y.o- patient's mother   of lung cancer. She had to take care of her younger sister.    2012- patient's  had a heart attack with stents placed, followed by cardiac rehabilitation    2000 TO 2012  was in [facility] for depression    2013 patient's  went through alcohol rehabilitation at [facility]            They have attended couple counseling a couple of times and patient went to the family program for chemical dependency.    Patient denies alcohol or drug use and herself            Has 2 children, girls ages 17 and 14. Oldest has been a \"trouble maker.\"     For a while she was working 3 jobs since her  was ill. Works at the [employer] for [employer]. Reports her job is very stressful.         Social " Determinants of Health     Financial Resource Strain: Unknown (2/26/2024)    Financial Resource Strain     Within the past 12 months, have you or your family members you live with been unable to get utilities (heat, electricity) when it was really needed?: Patient declined   Food Insecurity: Low Risk  (2/26/2024)    Food Insecurity     Within the past 12 months, did you worry that your food would run out before you got money to buy more?: No     Within the past 12 months, did the food you bought just not last and you didn t have money to get more?: No   Transportation Needs: Low Risk  (2/26/2024)    Transportation Needs     Within the past 12 months, has lack of transportation kept you from medical appointments, getting your medicines, non-medical meetings or appointments, work, or from getting things that you need?: No   Physical Activity: Unknown (2/26/2024)    Exercise Vital Sign     Days of Exercise per Week: 1 day     Minutes of Exercise per Session: Not on file   Stress: Stress Concern Present (2/26/2024)    [employer] [place] [employer] - Occupational Stress Questionnaire     Feeling of Stress : Rather much   Social Connections: Unknown (2/26/2024)    Social Connection and Isolation Panel [NHANES]     Frequency of Communication with Friends and Family: Not on file     Frequency of Social Gatherings with Friends and Family: Once a week     Attends Faith Services: Not on file     Active Member of Clubs or Organizations: Not on file     Attends Club or Organization Meetings: Not on file     Marital Status: Not on file   Interpersonal Safety: Low Risk  (2/26/2024)    Interpersonal Safety     Do you feel physically and emotionally safe where you currently live?: Yes     Within the past 12 months, have you been hit, slapped, kicked or otherwise physically hurt by someone?: No     Within the past 12 months, have you been humiliated or emotionally abused in other ways by your partner or ex-partner?: No    Housing Stability: Low Risk  (2/26/2024)    Housing Stability     Do you have housing? : Yes     Are you worried about losing your housing?: No       FAMILY HISTORY:  Family History   Problem Relation Age of Onset    Cancer Mother 45        lung    Neurologic Disorder Mother         epilepsy    Lipids Father     Gastrointestinal Disease Father         diverticulitis     Depression Father     Colitis Father     Colon Cancer Father     Diverticulitis Father     Depression Sister     Cancer Maternal Grandmother     Blood Disease Maternal Grandmother         lymphoma     Arthritis Maternal Grandmother     Diabetes Maternal Grandmother     Depression Maternal Grandmother     Macular Degeneration Maternal Grandmother     Glaucoma Maternal Grandmother     Diabetes Maternal Grandfather     Cerebrovascular Disease Maternal Grandfather     Blood Disease Maternal Grandfather     Heart Disease Maternal Grandfather     Glaucoma Maternal Grandfather     Cancer Paternal Grandmother     Cancer - colorectal Paternal Grandmother     Colitis Paternal Grandmother     Colon Cancer Paternal Grandmother     Diverticulitis Paternal Grandmother     Respiratory Paternal Grandfather         emphysema     Colitis Paternal Grandfather     Colon Cancer Paternal Grandfather     Diverticulitis Paternal Grandfather     Heart Disease Daughter     Asthma Daughter     Melanoma No family hx of     Anesthesia Reaction No family hx of     Clotting Disorder No family hx of        Past/family/social history reviewed and no changes      Again, thank you for allowing me to participate in the care of your patient.      Sincerely,    Renard Davey MD

## 2024-03-26 NOTE — PATIENT INSTRUCTIONS
It is good to see you today. Please follow the plan we have outlined below    My office will contact you about scheduling an upper endoscopy and pouchoscopy. We will aim to get these done in about 3 months    Please get blood work    We will refer you to the pelvic floor center    Please take omeprazole 40 mg daily for 2 months to see if this helps your regurgitation    Follow up with Beti Issa in 6 months and Dr. Davey in 12 months

## 2024-03-26 NOTE — PROGRESS NOTES
Virtual Visit Details    Type of service:  Video Visit   Video Start Time: 2:20 PM  Video End Time:2:43 PM    Originating Location (pt. Location): Home    Distant Location (provider location):  On-site  Platform used for Video Visit: Gabriel    IBD CLINIC VISIT     CC/REFERRING MD:  No ref. provider found    REASON FOR CONSULTATION: follow up UC s/p colectomy and J pouch formation    ASSESSMENT/PLAN    1. Ulcerative colitis now s/p IPAA with recent ostomy take down:     Currently she continues to gradually improve and is overall doing well. Her number of BMs are quite well managed with titration of imodium and lomotil. Her main complaint now is the sensation of incomplete defecation. She has been unable to wean off lomotil (causes significant diarrhea).     -Will try going down to 2 pills imodium and 1 pill lomotil BID.   -Start metamucil 1/2 tablespoon and titrate up  -Check pouchoscopy for objective evidence of pouchitis.   -Referral to pelvic floor center for evaluation of pelvic floor dysfunction.    2. IBD dysplasia surveillance:   not technically due until 2029    3. Regurgitation of food: follow symptoms. Most likely GERD. Obtain EGD at time of pouchoscopy.     4. Elevated LFTs: Mainly improved. Only mild residual alk phos elevation. May be a component of fatty liver. Hepatitis serologies, ISAEL, AMA, A1AT, celiac, SPEP, f-actin, MRCP are all normal.    -no more than 2000 mg of acetaminophen daily  -recheck LFTs    Return to clinic in 6 months with Beti Issa and 12 months with Dr. Davey    Thank you for this consultation.  It was a pleasure to participate in the care of this patient; please contact us with any further questions.  I spent a total of 45 minutes during the day of encounter performed chart review, meeting with patient, patient counseling, care coordination, and documentation.        This note was created with voice recognition software, and while reviewed for accuracy, typos may remain.      Renard Davey MD  Inflammatory Bowel Disease Program   Division of Gastroenterology, Hepatology and Nutrition  [facility]        IBD HISTORY  Age at diagnosis: [age]  Extent of disease: proctosigmoid   Current UC medications: None  Prior UC surgeries:  total abdominal colectomy and end ileostomy 6/15/21   Prior IBD Medications:   Infliximab, 1 dose in the hospital good response  Vedolizumab, completed induction dosing  Prednisone, minimal response to high-dose oral and IV with significant weight gain  5-ASA, no response  Topical therapies to include enemas, no response    DRUG MONITORING  TPMT enzyme activity:     6-TGN/6-MMPN levels:    Biologic concentration:         DISEASE ASSESSMENT    Endoscopic assessment:   EGD 2/14/23 ([place])  Post-op Diagnoses:        - LA Grade A reflux esophagitis.        - Erythematous mucosa in the prepyloric region of the stomach. Biopsied.        - A few fundic gland polyps.        - A single non-bleeding angioectasia in the duodenum (posterior wall).        - Biopsies were taken with a cold forceps for evaluation of celiac        disease.        - No clear cause of abdominal pain identified. No evidence of recurrent        melanoma.      Surgical Path:  Colon path 6/15/23:  FINAL DIAGNOSIS:   COLON, RESECTION:   - Moderate chronic active colitis with crypt abscess formation ([place]   grade 3)   - Negative for dysplasia and malignancy   - Viable surgical margins   - Two benign lymph nodes (0/2)      Surgical Pathology (9/5/2023):  A. ILEUM, ILEOSTOMY TRIM;  Ileo-cutaneous anastomotic junction with nonspecific mucosa inflammation, congestion, and other features consistent with ileostomy; no dysplasia or malignancy  B. RECTUM, COMPLETION PROCTECTOMY:  Mildly active chronic proctitis with:   -Neutrophilic cryptitis, mucosal atrophy and prominent reactive lymphoid hyperplasias  -Reactive mesorectal lymph nodes;  no dysplasia or malignancy  C. COLON, ANASTOMOTIC  RINGS; EXCISION:   -Portions of small intestinal mucosa and wall with no morphologic abnormalities   -Portion of rectal mucosa and wall with chronic proctitis; no dysplasia or malignancy      Enterography:   -- MRE 12/27/2022 no findings for acute inflammation of the bowel     Fecal calprotectin: --     C diff: negative 10/26/23, negative 1/12/23,  was positive 3/2021 treated with vanco taper (at [place])     HPI:   Currently, here for routine follow up.    Overall she is doing much better than December.    She had her ileostomy takedown in December and has been steadily improving since then. Has followed with [facility] post-op. Did have an episode of presumed pouchitis that was empirically treated with abx and this did help.    She has titrated anti-diarrheal meds with pretty good results. She takes 3 imodium and 1 lomotil BID. With this regimen she has 2-3 BMs in the day and 2 at night.     She does complain of some incomplete defecation. She is not taking any fiber. She denies any blood.    She complains of some nausea and regurgitation of food contents. She feels this happens when she needs to have a BM and also can be 30 min after eating.    She does have a known hernia at her prior ostomy site. She is seeing Dr. Arcadio Carrillo to discuss repair.    She continues to have pain of fibromyalgia in her upper extremities - she follows with rheumatology at [facility] and has an appointment with them tomorrow. She is taking acetaminophen 2000 mg daily. Denies NSAIDs.        Extra intestinal manifestations of IBD:  No uveitis/episcleritis  No aphthous ulcers   No arthritis   No erythema nodosum/pyoderma gangrenosum.     sIBDQ:  IBDQ Score Date IBDQ - Total Score  (Higher score better)   3/26/2024   2:01 PM 39   4/10/2018   7:20 AM 32          ROS:    Constitutional, HEENT, cardiovascular, pulmonary, GI, , musculoskeletal, neuro, skin, endocrine and psych systems are negative, except as otherwise noted.    PHYSICAL  EXAMINATION:  Constitutional: aaox3, cooperative, pleasant, not dyspneic/diaphoretic, no acute distress  Vitals reviewed: LMP 2024 (Approximate)   Wt:   Wt Readings from Last 2 Encounters:   24 86.6 kg (191 lb)   24 86.5 kg (190 lb 12.8 oz)      Eyes: Sclera anicteric/injected  Respiratory: Unlabored breathing  Skin: no jaundice  Psych: Normal affect    PERTINENT PAST MEDICAL HISTORY:  Past Medical History:   Diagnosis Date    Abnormal MRI     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     Anxiety     Basal cell carcinoma     Cervical high risk HPV (human papillomavirus) test positive 2019    See problem list    Colitis     Depression     Diabetes mellitus, iatrogenic (H) 2020    Esophageal reflux     Inflammatory arthritis     Insomnia     Intestinal giardiasis 2018    Lumbago     left lower back pain    Lymphedema     Malignant melanoma (H)     Melanoma (H) 10/23/2017    Migraines     Mild persistent asthma     Morbid obesity with BMI of 40.0-44.9, adult (H)     STORMY (obstructive sleep apnea)     Prothrombin deficiency (H)     takes 81mg asa daily    Stroke (cerebrum) (H)     During     TIA (transient ischemic attack)     Type 2 diabetes mellitus (H)     Ulcerative pancolitis (H)        PREVIOUS SURGERIES:  Past Surgical History:   Procedure Laterality Date    APPENDECTOMY  2004    COLONOSCOPY N/A 10/18/2017    Procedure: COLONOSCOPY;  Colon;  Surgeon: Debbie Stephens MD;  [facility]    COLONOSCOPY N/A 2018    Procedure: COMBINED COLONOSCOPY, SINGLE OR MULTIPLE BIOPSY/POLYPECTOMY BY BIOPSY;  colon;  Surgeon: Benita Schumacher MD;  [facility]: [facility]    COLONOSCOPY      multiple since  to present - about 6 total    DAVINCI ASSISTED TRANSANAL TOTAL MESORECTAL EXCISION N/A 2023    Procedure: COMPLETION PROCTECTOMY, ROBOT-ASSISTED, ILEAL POUCH ANASTAMOSIS;  Surgeon: Quinton Ram MD;  [facility]    DISSECT LYMPH NODE AXILLA  Left 10/23/2017    Procedure: DISSECT LYMPH NODE AXILLA;  Left Axillary Lymph Node Dissection ;  Surgeon: Laurent Cool MD;  [facility]    EXAM UNDER ANESTHESIA PELVIC N/A 2020    Procedure: EXAM UNDER ANESTHESIA, PELVIS; with Cervical Biopsies, Vaginal Biopsy and Endocervical Curettings;  Surgeon: Melina Jung MD;  [facility]    GYN SURGERY  ,         LAPAROSCOPIC ASSISTED COLECTOMY N/A 06/15/2021    Procedure: laparoscopic total abdominal colectomy, end ileostomy;  Surgeon: Quinton Ram MD;  [facility]    REPAIR MOHS Left 2017    Procedure: REPAIR MOHS;  Left Upper Lid Moh's Reconstruction;  Surgeon: Kisha Bosch MD;  [facility]    SIGMOIDOSCOPY FLEXIBLE N/A 2023    Procedure: Sigmoidoscopy flexible;  Surgeon: Quinton Ram MD;  [facility]    TAKEDOWN ILEOSTOMY N/A 2023    Procedure: CLOSURE, ILEOSTOMY;  Surgeon: Quinton Ram MD;  [facility]       ALLERGIES:     Allergies   Allergen Reactions    Bee Venom Swelling    Azithromycin Diarrhea    Erythromycin      Other reaction(s): GI intolerance, Vomiting    Fentanyl Other (See Comments)     sweating  sweating    Prochlorperazine Fatigue     Other reaction(s): Other (see comments)  Fatigue    Buspirone      Other reaction(s): GI intolerance  vomiting    Erythrocin Nausea and Vomiting    Gluten Meal      Celiac disease    Topamax [Topiramate]      Made her lethargic    Zithromax [Azithromycin Dihydrate] Diarrhea    Enbrel [Etanercept] Hives and Rash       PERTINENT MEDICATIONS:    Current Outpatient Medications:     acetaminophen (TYLENOL) 325 MG tablet, Take 3 tablets (975 mg) by mouth 3 times daily, Disp: , Rfl:     acetaminophen (TYLENOL) 500 MG tablet, Take 1,000 mg by mouth every 6 hours as needed for mild pain, Disp: , Rfl:     ARIPiprazole (ABILIFY) 2 MG tablet, Take 1 tablet (2 mg) by mouth every morning, Disp: 90 tablet, Rfl: 3     cholestyramine (QUESTRAN) 4 g packet, Take 1 packet (4 g) by mouth 2 times daily (with meals), Disp: 60 packet, Rfl: 1    diphenoxylate-atropine (LOMOTIL) 2.5-0.025 MG tablet, Take 2 tablets by mouth 2 times daily as needed for diarrhea, Disp: 240 tablet, Rfl: 5    hydrOXYzine (ATARAX) 25 MG tablet, Take 1 tablet (25 mg) by mouth 2 times daily (Patient taking differently: Take 25 mg by mouth 2 times daily as needed), Disp: 60 tablet, Rfl: 1    medical cannabis (Patient's own supply), , Disp: , Rfl:     medical cannabis (Patient's own supply), See Admin Instructions (The purpose of this order is to document that the patient reports taking medical cannabis.  This is not a prescription, and is not used to certify that the patient has a qualifying medical condition.), Disp: , Rfl:     metFORMIN (GLUCOPHAGE) 500 MG tablet, TAKE 2 TABLETS BY MOUTH 2 TIMES DAILY WITH MEALS, Disp: 360 tablet, Rfl: 1    methocarbamol (ROBAXIN) 500 MG tablet, Take 1 tablet (500 mg) by mouth 4 times daily as needed for muscle spasms, Disp: 56 tablet, Rfl: 0    methylcellulose (CITRUCEL) powder, Take 0.55 g (1.925 teaspoonful) by mouth daily, Disp: 454 g, Rfl: 3    ondansetron (ZOFRAN ODT) 4 MG ODT tab, DISSOLVE ONE TABLET BY MOUTH EVERY 8 HOURS AS NEEDED FOR NAUSEA., Disp: 20 tablet, Rfl: 1    Rectal Protectant-Emollient (CALMOL-4) 76-10 % SUPP, Place 1 suppository rectally 2 times daily as needed (anal irritation), Disp: 24 suppository, Rfl: 11    semaglutide (OZEMPIC) 2 MG/3ML pen, Inject 0.25 mg Subcutaneous every 7 days, Disp: 3 mL, Rfl: 0    simethicone (MYLICON) 125 MG chewable tablet, Take 1 tablet (125 mg) by mouth 2 times daily as needed for intestinal gas, Disp: 10 tablet, Rfl: 0    venlafaxine (EFFEXOR) 75 MG tablet, Take 2 tablets (150 mg) by mouth 2 times daily, Disp: 360 tablet, Rfl: 3  No current facility-administered medications for this visit.    Facility-Administered Medications Ordered in Other Visits:     lidocaine 1 % 9  "mL, 9 mL, Intradermal, Once, Anna Naidu MD    sodium bicarbonate 8.4 % injection 1 mEq, 1 mEq, Intradermal, Once, Anna Naidu MD    SOCIAL HISTORY:  Social History     Socioeconomic History    Marital status:      Spouse name: Not on file    Number of children: 2    Years of education: Not on file    Highest education level: Not on file   Occupational History    Occupation: on short term disability currently - is an    Tobacco Use    Smoking status: Former     Packs/day: 1.00     Years: 5.00     Additional pack years: 0.00     Total pack years: 5.00     Types: Cigarettes     Quit date: 3/20/1998     Years since quittin.0     Passive exposure: Past    Smokeless tobacco: Never   Vaping Use    Vaping Use: Never used   Substance and Sexual Activity    Alcohol use: Not Currently    Drug use: Yes     Types: Marijuana     Comment: vape, edible    Sexual activity: Not Currently     Partners: Male     Birth control/protection: Surgical   Other Topics Concern    Parent/sibling w/ CABG, MI or angioplasty before 65F 55M? No   Social History Narrative    20 y.o- patient's mother   of lung cancer. She had to take care of her younger sister.    2012- patient's  had a heart attack with stents placed, followed by cardiac rehabilitation    2000 TO 2012  was in [facility] for depression    2013 patient's  went through alcohol rehabilitation at [facility] inpatient            They have attended couple counseling a couple of times and patient went to the family program for chemical dependency.    Patient denies alcohol or drug use and herself            Has 2 children, girls ages 17 and 14. Oldest has been a \"trouble maker.\"     For a while she was working 3 jobs since her  was ill. 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 Reports her job is very stressful.         Social Determinants of Health "     Financial Resource Strain: Unknown (2/26/2024)    Financial Resource Strain     Within the past 12 months, have you or your family members you live with been unable to get utilities (heat, electricity) when it was really needed?: Patient declined   Food Insecurity: Low Risk  (2/26/2024)    Food Insecurity     Within the past 12 months, did you worry that your food would run out before you got money to buy more?: No     Within the past 12 months, did the food you bought just not last and you didn t have money to get more?: No   Transportation Needs: Low Risk  (2/26/2024)    Transportation Needs     Within the past 12 months, has lack of transportation kept you from medical appointments, getting your medicines, non-medical meetings or appointments, work, or from getting things that you need?: No   Physical Activity: Unknown (2/26/2024)    Exercise Vital Sign     Days of Exercise per Week: 1 day     Minutes of Exercise per Session: Not on file   Stress: Stress Concern Present (2/26/2024)    [place] [employer] - Occupational Stress Questionnaire     Feeling of Stress : Rather much   Social Connections: Unknown (2/26/2024)    Social Connection and Isolation Panel [NHANES]     Frequency of Communication with Friends and Family: Not on file     Frequency of Social Gatherings with Friends and Family: Once a week     Attends Druze Services: Not on file     Active Member of Clubs or Organizations: Not on file     Attends Club or Organization Meetings: Not on file     Marital Status: Not on file   Interpersonal Safety: Low Risk  (2/26/2024)    Interpersonal Safety     Do you feel physically and emotionally safe where you currently live?: Yes     Within the past 12 months, have you been hit, slapped, kicked or otherwise physically hurt by someone?: No     Within the past 12 months, have you been humiliated or emotionally abused in other ways by your partner or ex-partner?: No   Housing Stability:  Low Risk  (2/26/2024)    Housing Stability     Do you have housing? : Yes     Are you worried about losing your housing?: No       FAMILY HISTORY:  Family History   Problem Relation Age of Onset    Cancer Mother 45        lung    Neurologic Disorder Mother         epilepsy    Lipids Father     Gastrointestinal Disease Father         diverticulitis     Depression Father     Colitis Father     Colon Cancer Father     Diverticulitis Father     Depression Sister     Cancer Maternal Grandmother     Blood Disease Maternal Grandmother         lymphoma     Arthritis Maternal Grandmother     Diabetes Maternal Grandmother     Depression Maternal Grandmother     Macular Degeneration Maternal Grandmother     Glaucoma Maternal Grandmother     Diabetes Maternal Grandfather     Cerebrovascular Disease Maternal Grandfather     Blood Disease Maternal Grandfather     Heart Disease Maternal Grandfather     Glaucoma Maternal Grandfather     Cancer Paternal Grandmother     Cancer - colorectal Paternal Grandmother     Colitis Paternal Grandmother     Colon Cancer Paternal Grandmother     Diverticulitis Paternal Grandmother     Respiratory Paternal Grandfather         emphysema     Colitis Paternal Grandfather     Colon Cancer Paternal Grandfather     Diverticulitis Paternal Grandfather     Heart Disease Daughter     Asthma Daughter     Melanoma No family hx of     Anesthesia Reaction No family hx of     Clotting Disorder No family hx of        Past/family/social history reviewed and no changes

## 2024-03-28 NOTE — TELEPHONE ENCOUNTER
Central Prior Authorization Team   Phone: [phone]    PA Initiation    Medication: Ozempic 0.25  Insurance Company: MEDICA - Phone [phone] Fax [phone]  Pharmacy Filling the Rx: [place], [place] - [address]  Filling Pharmacy Phone: [phone]  Filling Pharmacy Fax:    Start Date: 3/28/2024

## 2024-03-29 ENCOUNTER — TELEPHONE (OUTPATIENT)
Dept: GASTROENTEROLOGY | Facility: CLINIC | Age: 49
End: 2024-03-29
Payer: COMMERCIAL

## 2024-03-29 ENCOUNTER — TELEPHONE (OUTPATIENT)
Dept: FAMILY MEDICINE | Facility: CLINIC | Age: 49
End: 2024-03-29
Payer: COMMERCIAL

## 2024-03-29 DIAGNOSIS — M75.52 SUBACROMIAL BURSITIS OF BOTH SHOULDERS: Primary | ICD-10-CM

## 2024-03-29 DIAGNOSIS — M75.51 SUBACROMIAL BURSITIS OF BOTH SHOULDERS: Primary | ICD-10-CM

## 2024-03-29 NOTE — TELEPHONE ENCOUNTER
Referral placed for non surgical ortho.    They should call her to arrange to be seen.      Anna Naidu M.D.

## 2024-03-29 NOTE — TELEPHONE ENCOUNTER
This writer called and spoke to the patient to see if she would like to schedule her surgery with Dr. Carrillo. Patient stated that she would like to wait to schedule until the summer. Advised the patient to call [phone] when she is ready to schedule. Patient would also like a call from clinical staff with questions about recovery time. Routing to Dr. Carrillo's RNCC to follow up. Patient expressed understanding.    Sylvia Keenan on 3/29/2024 at 12:26 PM

## 2024-03-29 NOTE — TELEPHONE ENCOUNTER
Prior Authorization Approval    Authorization Effective Date: 2/27/2024  Authorization Expiration Date: 3/28/2025  Medication: Ozempic 0.25  Reference #:     Insurance Company: MEDICA - Phone [phone] Fax [phone]  Which Pharmacy is filling the prescription (Not needed for infusion/clinic administered): [facility] [place], [place] - [address]  Pharmacy Notified: Yes  Patient Notified: Instructed pharmacy to notify patient when script is ready to /ship.

## 2024-03-29 NOTE — TELEPHONE ENCOUNTER
Left Voicemail (1st Attempt) and Sent Mychart (1st Attempt) for the patient to call back and schedule the following:    Appointment type: Return IBD  Provider: Clementine for 6 month, Dr. Davey for 1 year  Return date: Sept/October with Clementine and March 2025 with Dr. Davey  Specialty phone number: [phone]  Additional appointment(s) needed:   Additonal Notes:

## 2024-03-29 NOTE — TELEPHONE ENCOUNTER
Patient requesting referral.     Copied from [place] notes in Care Everywhere:     I would recommend sending her for ultrasound-guided bilateral subacromial bursa injections. Unfortunately, our ultrasound clinic is full today, so she would rather pursue this locally and will ask her primary care doctor to see if she can order these for her to be done there for symptomatic relief. I do not think she needs to go back on any immunosuppression at present. I think she can continue to be observed, and we would like to see her back in 1 year's time. She is comfortable with this plan.    Savannah Brady M.D., Ph.D.  DD: 03/27/2024 15:17:55 CT  DT: 03/27/2024 15:47:10 CT  Job ID: [id number]      Patient is requesting Dr. Naidu to place referral orders and recommend a location to have this done.   Requires a visit?  Shouldn't rheumatology order?  I don't see any notes from February visit regarding this issue.     Britany Keenan RN on 3/29/2024 at 8:55 AM

## 2024-04-02 ENCOUNTER — TELEPHONE (OUTPATIENT)
Dept: GASTROENTEROLOGY | Facility: CLINIC | Age: 49
End: 2024-04-02
Payer: COMMERCIAL

## 2024-04-02 NOTE — TELEPHONE ENCOUNTER
Left Voicemail (2nd Attempt) and Sent Mychart (2nd Attempt) for the patient to call back and schedule the following:    Appointment type: Return IBD  Provider: Beti Issa PA-C: 09/26/24 + Dr Davey: 03/26/25  Return date: 09/26/24 + 03/26/25  Specialty phone number: [phone]  Additional appointment(s) needed: N/A  Additonal Notes: N/A

## 2024-04-03 ENCOUNTER — PATIENT OUTREACH (OUTPATIENT)
Dept: ENDOCRINOLOGY | Facility: CLINIC | Age: 49
End: 2024-04-03
Payer: COMMERCIAL

## 2024-04-03 NOTE — PROGRESS NOTES
Answered questions regarding typical postop recovery after hernia surgery.  Patient will need to wait until early summer to accrue enough time off for surgery.

## 2024-04-05 ENCOUNTER — TELEPHONE (OUTPATIENT)
Dept: GASTROENTEROLOGY | Facility: CLINIC | Age: 49
End: 2024-04-05
Payer: COMMERCIAL

## 2024-04-05 NOTE — TELEPHONE ENCOUNTER
"Endoscopy Scheduling Screen    Have you had a positive Covid test in the last 14 days?  No    What is your communication preference for Instructions and/or Bowel Prep?   MyChart    What insurance is in the chart?  Other:  MEDICA/BLUE PLUS    Ordering/Referring Provider: Renard Davey MD     (If ordering provider performs procedure, schedule with ordering provider unless otherwise instructed. )    BMI: Estimated body mass index is 33.83 kg/m  as calculated from the following:    Height as of 2/26/24: 1.6 m (5' 3\").    Weight as of 2/26/24: 86.6 kg (191 lb).     Sedation Ordered  MAC/deep sedation.   BMI<= 45 45 < BMI <= 48 48 < BMI < = 50  BMI > 50   No Restrictions No [facility]  No [facility]  [facility] with exceptions Hospital Only OR Only       Do you have a history of malignant hyperthermia?  No    (Females) Are you currently pregnant?   No     Have you been diagnosed or told you have pulmonary hypertension?   No    Do you have an LVAD?  No    Have you been told you have moderate to severe sleep apnea?  Yes (RN Review required for scheduling unless scheduling in Hospital.)    Have you been told you have COPD, asthma, or any other lung disease?  Yes     What breathing problems do you have?  Asthma     Do you use home oxygen?  No    Have your breathing problems required an ED visit or hospitalization in the last year?  No    Do you have any heart conditions?  No     Have you ever had or are you waiting for an organ transplant?  No. Continue scheduling, no site restrictions.    Have you had a stroke or transient ischemic attack (TIA aka \"mini stroke\" in the last 6 months?   No    Have you been diagnosed with or been told you have cirrhosis of the liver?   No    Are you currently on dialysis?   No    Do you need assistance transferring?   No    BMI: Estimated body mass index is 33.83 kg/m  as calculated from the following:    Height as of 2/26/24: 1.6 m (5' 3\").    Weight as of 2/26/24: 86.6 kg (191 lb). "     Is patients BMI > 40 and scheduling location [facility]?  No    Do you take an injectable medication for weight loss or diabetes (excluding insulin)?  Yes, hold time can be up to 7 days. Please consult with you prescribing provider to discuss endoscopy recommendations.     Do you take the medication Naltrexone?  No    Do you take blood thinners?  No       Prep   Are you currently on dialysis or do you have chronic kidney disease?  No    Do you have a diagnosis of diabetes?  Yes (Golytely Prep)    Do you have a diagnosis of cystic fibrosis (CF)?  No    On a regular basis do you go 3 -5 days between bowel movements?  No    BMI > 40?  No    Preferred Pharmacy:      [place], [place] - [address]  [address]  [address] [facility] [address]  Phone: [phone] Fax: [phone] Alternate Fax: [phone]      Final Scheduling Details     Procedure scheduled  Upper endoscopy (EGD) and Pouchoscopy    Surgeon:  THU     Date of procedure:  8/7     Pre-OP / PAC:   Yes - Patient informed of pre-op requirement.    Location  [facility] - Per exclusion criteria.    Sedation   MAC/Deep Sedation - Per order.      Patient Reminders:   You will receive a call from a Nurse to review instructions and health history.  This assessment must be completed prior to your procedure.  Failure to complete the Nurse assessment may result in the procedure being cancelled.      On the day of your procedure, please designate an adult(s) who can drive you home stay with you for the next 24 hours. The medicines used in the exam will make you sleepy. You will not be able to drive.      You cannot take public transportation, ride share services, or non-medical taxi service without a responsible caregiver.  Medical transport services are allowed with the requirement that a responsible caregiver will receive you at your destination.  We require that drivers and caregivers are confirmed prior to your  procedure.

## 2024-04-10 NOTE — ED AVS SNAPSHOT
[facility] Emergency Department  [address] [address]  Phone:  [phone]  Fax:  [phone]                                    Doretha Fernandez   MRN: [id number]    Department:  [facility] Emergency Department   Date of Visit:  9/6/2020           After Visit Summary Signature Page    I have received my discharge instructions, and my questions have been answered. I have discussed any challenges I see with this plan with the nurse or doctor.    ..........................................................................................................................................  Patient/Patient Representative Signature      ..........................................................................................................................................  Patient Representative Print Name and Relationship to Patient    ..................................................               ................................................  Date                                   Time    ..........................................................................................................................................  Reviewed by Signature/Title    ...................................................              ..............................................  Date                                               Time          22EPIC Rev 08/18        1+ b/l

## 2024-04-15 ENCOUNTER — OFFICE VISIT (OUTPATIENT)
Dept: ORTHOPEDICS | Facility: CLINIC | Age: 49
End: 2024-04-15
Attending: FAMILY MEDICINE
Payer: COMMERCIAL

## 2024-04-15 ENCOUNTER — ANCILLARY PROCEDURE (OUTPATIENT)
Dept: GENERAL RADIOLOGY | Facility: CLINIC | Age: 49
End: 2024-04-15
Attending: FAMILY MEDICINE
Payer: COMMERCIAL

## 2024-04-15 VITALS — HEIGHT: 63 IN | BODY MASS INDEX: 35.26 KG/M2 | WEIGHT: 199 LBS

## 2024-04-15 DIAGNOSIS — M25.511 CHRONIC PAIN OF BOTH SHOULDERS: ICD-10-CM

## 2024-04-15 DIAGNOSIS — M75.32 CALCIFIC TENDINITIS OF BOTH SHOULDERS: Primary | ICD-10-CM

## 2024-04-15 DIAGNOSIS — M75.52 SUBACROMIAL BURSITIS OF BOTH SHOULDERS: ICD-10-CM

## 2024-04-15 DIAGNOSIS — G89.29 CHRONIC PAIN OF BOTH SHOULDERS: ICD-10-CM

## 2024-04-15 DIAGNOSIS — M25.512 CHRONIC PAIN OF BOTH SHOULDERS: ICD-10-CM

## 2024-04-15 DIAGNOSIS — M75.31 CALCIFIC TENDINITIS OF BOTH SHOULDERS: Primary | ICD-10-CM

## 2024-04-15 DIAGNOSIS — M75.51 SUBACROMIAL BURSITIS OF BOTH SHOULDERS: ICD-10-CM

## 2024-04-15 PROCEDURE — 20611 DRAIN/INJ JOINT/BURSA W/US: CPT | Mod: 50 | Performed by: FAMILY MEDICINE

## 2024-04-15 PROCEDURE — 99204 OFFICE O/P NEW MOD 45 MIN: CPT | Mod: 25 | Performed by: FAMILY MEDICINE

## 2024-04-15 PROCEDURE — 73030 X-RAY EXAM OF SHOULDER: CPT | Mod: TC | Performed by: RADIOLOGY

## 2024-04-15 RX ORDER — BETAMETHASONE SODIUM PHOSPHATE AND BETAMETHASONE ACETATE 3; 3 MG/ML; MG/ML
6 INJECTION, SUSPENSION INTRA-ARTICULAR; INTRALESIONAL; INTRAMUSCULAR; SOFT TISSUE
Status: DISCONTINUED | OUTPATIENT
Start: 2024-04-15 | End: 2024-09-24

## 2024-04-15 RX ORDER — ROPIVACAINE HYDROCHLORIDE 5 MG/ML
4 INJECTION, SOLUTION EPIDURAL; INFILTRATION; PERINEURAL
Status: DISCONTINUED | OUTPATIENT
Start: 2024-04-15 | End: 2024-09-24

## 2024-04-15 RX ADMIN — ROPIVACAINE HYDROCHLORIDE 4 ML: 5 INJECTION, SOLUTION EPIDURAL; INFILTRATION; PERINEURAL at 15:32

## 2024-04-15 RX ADMIN — BETAMETHASONE SODIUM PHOSPHATE AND BETAMETHASONE ACETATE 6 MG: 3; 3 INJECTION, SUSPENSION INTRA-ARTICULAR; INTRALESIONAL; INTRAMUSCULAR; SOFT TISSUE at 15:32

## 2024-04-15 ASSESSMENT — PAIN SCALES - GENERAL: PAINLEVEL: EXTREME PAIN (8)

## 2024-04-15 NOTE — PROGRESS NOTES
ASSESSMENT & PLAN    Doretha was seen today for pain and pain.    Diagnoses and all orders for this visit:    Subacromial bursitis of both shoulders  -     Orthopedic  Referral      This issue is acute on chronic and Worsening.    # Bilateral Calcific Tendinitis: Doretha Yu  was seen today for acute on chronic bilateral shoulder pain. Symptoms had been going on for years, worsening over the past few months. On examination there are positive findings of tenderness to palpation over the rotator cuff tendons, pain limiting range of motion. Imaging findings showed significant calcific tendinitis over the shoulders, mild shoulder joint arthritis. Likely cause of patient's condition due to calcific tendinitis. Pain affecting sleep. No radicular symptoms. Other possible conditions contributing to symptoms include shoulder arthritis.  Counseled patient on nature of condition and treatment options.  Given this plan as below, follow-up 1 mon as needed.     Image Findings: bilateral shoulder calcific tendinitis, mild shoulder arthritis  Treatment: Activities as tolerated, home exercises given today  Medications/Injections: Limited tylenol/ibuprofen for pain for 1-2 weeks, Topical Voltaren gel, bilateral rotator cuff tendon steroid injections  Follow-up: In one month if symptoms do not improve, sooner if worsening  Can consider shoulder joint steroid injections, evaluate cervical spine      Shubham Sousa MD  [facility] [place]    -----  Chief Complaint   Patient presents with    Right Shoulder - Pain    Left Shoulder - Pain       SUBJECTIVE  Doretha Yu is a/an 49 year old female who is seen in consultation at the request of  Anna Naidu M.D. for evaluation of bilateral shoulder pain.     The patient is seen by themselves.  The patient is Right handed    Onset: A few years(s) ago. Reports insidious onset without acute precipitating event. Treating with Rheumatology at [place],  "who recommended shoulder injections.   Location of Pain: bilateral shoulder-diffuse, right worse than left radiating to neck and down to fingers   Worsened by: lifting, sleeping   Better with: nothing   Treatments tried: Tylenol, medications for arthritis   Associated symptoms: numbness and tingling (chronic neuropathy)     Orthopedic/Surgical history: NO Rheumatoid arthritis   Social History/Occupation: sees [place]     No family history pertinent to patient's problem today.     REVIEW OF SYSTEMS:  Review of Systems  Constitutional, HEENT, cardiovascular, pulmonary, gi and gu systems are negative, except as otherwise noted.    OBJECTIVE:  Ht 1.6 m (5' 3\")   LMP 01/26/2024 (Approximate)   BMI 33.83 kg/m     General: healthy, alert and in no distress  HEENT: no scleral icterus or conjunctival erythema  Skin: no suspicious lesions or rash. No jaundice.  CV: distal perfusion intact    Resp: normal respiratory effort without conversational dyspnea   Psych: normal mood and affect  Gait: normal steady gait with appropriate coordination and balance    Neuro: Normal light sensory exam of bilateral upper extremities    Ortho Exam   BILATERAL SHOULDER  Inspection:    no swelling, bruising, discoloration, or obvious deformity or asymmetry  Palpation:    Tender about the supraspinatus insertion. Remainder of bony and tendinous landmarks are nontender.  Active Range of Motion:     Abduction 800, , , IR hip pocket.       Strength:    Scapular plane abduction 5-/5, painful, weakness,  ER 5-/5, painful, weakness, IR 5-/5, painful, weakness, biceps 5/5, triceps 5/5  Special Tests:    Positive: Neer's and Arellano', supraspinatus          RADIOLOGY:  I independently ordered, visualized and reviewed these images with the patient  Significant bilateral calcific tendinitis      Review of the result(s) of each unique test - bilateral shoulder x-rays       Disclaimer: This note consists of symbols derived from keyboarding, " dictation and/or voice recognition software. As a result, there may be errors in the script that have gone undetected. Please consider this when interpreting information found in this chart.    Large Joint Injection/Arthocentesis: bilateral subacromial bursa    Date/Time: 4/15/2024 3:32 PM    Performed by: Shubham Sousa MD  Authorized by: Shubham Sousa MD    Indications:  Pain  Needle Size:  21 G  Guidance: ultrasound    Approach:  Lateral  Location:  Shoulder  Laterality:  Bilateral      Site:  Bilateral subacromial bursa  Medications (Right):  6 mg betamethasone acet & sod phos 6 (3-3) MG/ML; 4 mL ROPivacaine 5 MG/ML  Medications (Left):  6 mg betamethasone acet & sod phos 6 (3-3) MG/ML; 4 mL ROPivacaine 5 MG/ML  Outcome:  Tolerated well, no immediate complications  Procedure discussed: discussed risks, benefits, and alternatives    Consent Given by:  Patient  Timeout: timeout called immediately prior to procedure    Prep: patient was prepped and draped in usual sterile fashion     Ultrasound images of procedure were permanently stored.     Patient reported some improvement of pain after the numbing portion bilateral subacromial bursa steroid injections.  Ultrasound guided images were permanently stored.   Aftercare instructions given to patient.  Plan to follow-up as discussed above.     Shubham Sousa MD [facility]

## 2024-04-15 NOTE — LETTER
4/15/2024         RE: Doretha Yu  [address] [address]        Dear Colleague,    Thank you for referring your patient, Doretha Yu, to the [facility]. Please see a copy of my visit note below.    ASSESSMENT & PLAN    Doretha was seen today for pain and pain.    Diagnoses and all orders for this visit:    Subacromial bursitis of both shoulders  -     Orthopedic  Referral      This issue is acute on chronic and Worsening.    # Bilateral Calcific Tendinitis: Doretha Yu  was seen today for acute on chronic bilateral shoulder pain. Symptoms had been going on for years, worsening over the past few months. On examination there are positive findings of tenderness to palpation over the rotator cuff tendons, pain limiting range of motion. Imaging findings showed significant calcific tendinitis over the shoulders, mild shoulder joint arthritis. Likely cause of patient's condition due to calcific tendinitis. Pain affecting sleep. No radicular symptoms. Other possible conditions contributing to symptoms include shoulder arthritis.  Counseled patient on nature of condition and treatment options.  Given this plan as below, follow-up 1 mon as needed.     Image Findings: bilateral shoulder calcific tendinitis, mild shoulder arthritis  Treatment: Activities as tolerated, home exercises given today  Medications/Injections: Limited tylenol/ibuprofen for pain for 1-2 weeks, Topical Voltaren gel, bilateral rotator cuff tendon steroid injections  Follow-up: In one month if symptoms do not improve, sooner if worsening  Can consider shoulder joint steroid injections, evaluate cervical spine      Shubham Sousa MD  [facility]    -----  Chief Complaint   Patient presents with     Right Shoulder - Pain     Left Shoulder - Pain       SUBJECTIVE  Doretha Yu is a/an 49 year old female who is seen in consultation at the request  "of  Anna Naidu M.D. for evaluation of bilateral shoulder pain.     The patient is seen by themselves.  The patient is Right handed    Onset: A few years(s) ago. Reports insidious onset without acute precipitating event. Treating with Rheumatology at [place], who recommended shoulder injections.   Location of Pain: bilateral shoulder-diffuse, right worse than left radiating to neck and down to fingers   Worsened by: lifting, sleeping   Better with: nothing   Treatments tried: Tylenol, medications for arthritis   Associated symptoms: numbness and tingling (chronic neuropathy)     Orthopedic/Surgical history: NO Rheumatoid arthritis   Social History/Occupation: sees [place]     No family history pertinent to patient's problem today.     REVIEW OF SYSTEMS:  Review of Systems  Constitutional, HEENT, cardiovascular, pulmonary, gi and gu systems are negative, except as otherwise noted.    OBJECTIVE:  Ht 1.6 m (5' 3\")   LMP 01/26/2024 (Approximate)   BMI 33.83 kg/m     General: healthy, alert and in no distress  HEENT: no scleral icterus or conjunctival erythema  Skin: no suspicious lesions or rash. No jaundice.  CV: distal perfusion intact    Resp: normal respiratory effort without conversational dyspnea   Psych: normal mood and affect  Gait: normal steady gait with appropriate coordination and balance    Neuro: Normal light sensory exam of bilateral upper extremities    Ortho Exam   BILATERAL SHOULDER  Inspection:    no swelling, bruising, discoloration, or obvious deformity or asymmetry  Palpation:    Tender about the supraspinatus insertion. Remainder of bony and tendinous landmarks are nontender.  Active Range of Motion:     Abduction 800, , , IR hip pocket.       Strength:    Scapular plane abduction 5-/5, painful, weakness,  ER 5-/5, painful, weakness, IR 5-/5, painful, weakness, biceps 5/5, triceps 5/5  Special Tests:    Positive: Neer's and Arellano', supraspinatus          RADIOLOGY:  I independently " ordered, visualized and reviewed these images with the patient  Significant bilateral calcific tendinitis      Review of the result(s) of each unique test - bilateral shoulder x-rays       Disclaimer: This note consists of symbols derived from keyboarding, dictation and/or voice recognition software. As a result, there may be errors in the script that have gone undetected. Please consider this when interpreting information found in this chart.    Large Joint Injection/Arthocentesis: bilateral subacromial bursa    Date/Time: 4/15/2024 3:32 PM    Performed by: Shubham Sousa MD  Authorized by: Shubham Sousa MD    Indications:  Pain  Needle Size:  21 G  Guidance: ultrasound    Approach:  Lateral  Location:  Shoulder  Laterality:  Bilateral      Site:  Bilateral subacromial bursa  Medications (Right):  6 mg betamethasone acet & sod phos 6 (3-3) MG/ML; 4 mL ROPivacaine 5 MG/ML  Medications (Left):  6 mg betamethasone acet & sod phos 6 (3-3) MG/ML; 4 mL ROPivacaine 5 MG/ML  Outcome:  Tolerated well, no immediate complications  Procedure discussed: discussed risks, benefits, and alternatives    Consent Given by:  Patient  Timeout: timeout called immediately prior to procedure    Prep: patient was prepped and draped in usual sterile fashion     Ultrasound images of procedure were permanently stored.     Patient reported some improvement of pain after the numbing portion bilateral subacromial bursa steroid injections.  Ultrasound guided images were permanently stored.   Aftercare instructions given to patient.  Plan to follow-up as discussed above.     Shubham Sousa MD [facility]            Again, thank you for allowing me to participate in the care of your patient.        Sincerely,        Shubham Sousa MD

## 2024-04-15 NOTE — PATIENT INSTRUCTIONS
# Bilateral Calcific Tendinitis: Doretha Yu  was seen today for acute on chronic bilateral shoulder pain. Symptoms had been going on for years, worsening over the past few months. On examination there are positive findings of tenderness to palpation over the rotator cuff tendons, pain limiting range of motion. Imaging findings showed significant calcific tendinitis over the shoulders, mild shoulder joint arthritis. Likely cause of patient's condition due to calcific tendinitis. Pain affecting sleep. No radicular symptoms. Other possible conditions contributing to symptoms include shoulder arthritis.  Counseled patient on nature of condition and treatment options.  Given this plan as below, follow-up 1 mon as needed.     Image Findings: bilateral shoulder calcific tendinitis, mild shoulder arthritis  Treatment: Activities as tolerated, home exercises given today  Medications/Injections: Limited tylenol/ibuprofen for pain for 1-2 weeks, Topical Voltaren gel, bilateral rotator cuff tendon steroid injections  Follow-up: In one month if symptoms do not improve, sooner if worsening  Can consider shoulder joint steroid injections, evaluate cervical spine    Please call [phone]   Ask for my team if you have any questions or concerns    If you have not yet received the influenza vaccine but would like to get one, please call  [phone] or you can schedule via [employer]    It was great seeing you today!    Shubham Sousa MD, [facility]     [facility] Injection Discharge Instructions    Procedure: Bilateral subacromial bursa steroid injections     You may shower, however avoid swimming, tub baths or hot tubs for 24 hours following your procedure  You may have a mild to moderate increase in pain for several days following the injection.  It may take up to 14 days for the steroid medication to start working although you may feel the effect as early as a few days after the procedure.  You may use ice packs for 10-15 minutes,  3 to 4 times a day at the injection site for comfort  You may use anti-inflammatory medications (such as Ibuprofen or Aleve or Advil) or Tylenol for pain control if necessary  If you were fasting, you may resume your normal diet and medications after the procedure  If you have diabetes, check your blood sugar more frequently than usual as your blood sugar may be higher than normal for 10-14 days following a steroid injection. Contact your doctor who manages your diabetes if your blood sugar is higher than usual    If you experience any of the following, call [facility] @ [phone] or [phone]  -Fever over 100 degree F  -Swelling, bleeding, redness, drainage, warmth at the injection site  - New or worsening pain

## 2024-04-23 ENCOUNTER — TELEPHONE (OUTPATIENT)
Dept: FAMILY MEDICINE | Facility: CLINIC | Age: 49
End: 2024-04-23
Payer: COMMERCIAL

## 2024-04-23 NOTE — TELEPHONE ENCOUNTER
Received call from patient.  States that she is having a reaction to something, her medications, her j pouch or something else.  Patient could sleep non stop,  She is so tired.  Appointment scheduled with Dr Naidu for 4/24/24 at 2:40.  Rey Pizano RN

## 2024-04-24 ENCOUNTER — OFFICE VISIT (OUTPATIENT)
Dept: FAMILY MEDICINE | Facility: CLINIC | Age: 49
End: 2024-04-24
Payer: COMMERCIAL

## 2024-04-24 VITALS
OXYGEN SATURATION: 98 % | TEMPERATURE: 98.6 F | HEART RATE: 78 BPM | RESPIRATION RATE: 18 BRPM | HEIGHT: 63 IN | BODY MASS INDEX: 34.91 KG/M2 | WEIGHT: 197 LBS | SYSTOLIC BLOOD PRESSURE: 100 MMHG | DIASTOLIC BLOOD PRESSURE: 70 MMHG

## 2024-04-24 DIAGNOSIS — G47.19 EXCESSIVE DAYTIME SLEEPINESS: Primary | ICD-10-CM

## 2024-04-24 DIAGNOSIS — E11.9 TYPE 2 DIABETES MELLITUS WITHOUT COMPLICATION, WITHOUT LONG-TERM CURRENT USE OF INSULIN (H): ICD-10-CM

## 2024-04-24 DIAGNOSIS — K59.1 FUNCTIONAL DIARRHEA: ICD-10-CM

## 2024-04-24 PROCEDURE — 99213 OFFICE O/P EST LOW 20 MIN: CPT | Performed by: FAMILY MEDICINE

## 2024-04-24 RX ORDER — LOPERAMIDE HCL 2 MG
CAPSULE ORAL
COMMUNITY
Start: 2024-01-18 | End: 2024-04-24

## 2024-04-24 RX ORDER — LOPERAMIDE HCL 2 MG
2 CAPSULE ORAL 4 TIMES DAILY PRN
Qty: 240 CAPSULE | Refills: 11 | Status: SHIPPED | OUTPATIENT
Start: 2024-04-24

## 2024-04-24 ASSESSMENT — PATIENT HEALTH QUESTIONNAIRE - PHQ9
SUM OF ALL RESPONSES TO PHQ QUESTIONS 1-9: 10
10. IF YOU CHECKED OFF ANY PROBLEMS, HOW DIFFICULT HAVE THESE PROBLEMS MADE IT FOR YOU TO DO YOUR WORK, TAKE CARE OF THINGS AT HOME, OR GET ALONG WITH OTHER PEOPLE: SOMEWHAT DIFFICULT
SUM OF ALL RESPONSES TO PHQ QUESTIONS 1-9: 10

## 2024-04-24 ASSESSMENT — PAIN SCALES - GENERAL: PAINLEVEL: NO PAIN (0)

## 2024-04-24 NOTE — PROGRESS NOTES
"  Assessment & Plan     Excessive daytime sleepiness  Likely secondary to significant interruptions in overnight sleep.   Falls asleep sitting up at times.   I referred her to Sleep Medicine in Feb when I saw her and she did not yet make that apt.     Type 2 diabetes mellitus without complication, without long-term current use of insulin (H)  Has had 4 lbs of weight loss in the 2.5 weeks she's been on ozempic 2.5 mg  - semaglutide (OZEMPIC) 2 MG/3ML pen; Inject 0.5 mg Subcutaneous every 7 days    Functional diarrhea  Has been taking 2 tabs twice daily (down from 4 tabs twice daily).  I would recommend she do 2 in the am and 4 in the pm to see if we can get her sleeping a bit better.   - loperamide (IMODIUM) 2 MG capsule; Take 1 capsule (2 mg) by mouth 4 times daily as needed for diarrhea Up to 8/day            BMI  Estimated body mass index is 34.9 kg/m  as calculated from the following:    Height as of this encounter: 1.6 m (5' 3\").    Weight as of this encounter: 89.4 kg (197 lb).             Lance Coyne is a 49 year old, presenting for the following health issues:  Fatigue        4/24/2024     2:49 PM   Additional Questions   Roomed by Ruth Ann raymond CMA   Accompanied by Self     History of Present Illness       Reason for visit:  Tiredness  Symptom onset:  3-4 weeks ago    She eats 2-3 servings of fruits and vegetables daily.She consumes 0 sweetened beverage(s) daily.She exercises with enough effort to increase her heart rate 9 or less minutes per day.  She exercises with enough effort to increase her heart rate 3 or less days per week.   She is taking medications regularly.       - Fatigue x 2 weeks. PMHx diabetes, ileostomy and depression. She is taking Effexor 150mg bid and feels this is under control. She notes that she usually goes to bed at 9-10PM and is able to fall asleep quickly. She wakes up 2-3 times per night due to J pouch being most active at that time. She is then sometimes unable to fall back " "asleep. She wakes up at 7AM and does sometimes nap during the day. She is unsure of snoring or breathing problems at night currently but does have PMHx obstructive sleep apnea. She used to use a CPAP but notes that this no longer fits. She notes some intermittent nausea, emesis and stomach pain. She notes feeling like she has to use the bathroom but does have about 8 stools per day. Not getting good sleep at night as she's often up several times and can't fall back asleep.     Labs recently normal (CBC, bmp, TSH)      Review of Systems  Constitutional, HEENT, cardiovascular, pulmonary, gi and gu systems are negative, except as otherwise noted.      Objective    /70   Pulse 78   Temp 98.6  F (37  C) (Tympanic)   Resp 18   Ht 1.6 m (5' 3\")   Wt 89.4 kg (197 lb)   LMP 02/01/2024   SpO2 98%   BMI 34.90 kg/m    Body mass index is 34.9 kg/m .  Physical Exam   GENERAL: alert and no distress            Signed Electronically by: Anna Naidu MD    "

## 2024-05-13 ENCOUNTER — VIRTUAL VISIT (OUTPATIENT)
Dept: SLEEP MEDICINE | Facility: CLINIC | Age: 49
End: 2024-05-13
Payer: COMMERCIAL

## 2024-05-13 VITALS — BODY MASS INDEX: 34.02 KG/M2 | WEIGHT: 192 LBS | HEIGHT: 63 IN

## 2024-05-13 DIAGNOSIS — F51.04 CHRONIC INSOMNIA: ICD-10-CM

## 2024-05-13 DIAGNOSIS — G47.33 OSA (OBSTRUCTIVE SLEEP APNEA): Primary | Chronic | ICD-10-CM

## 2024-05-13 DIAGNOSIS — G47.19 EXCESSIVE DAYTIME SLEEPINESS: ICD-10-CM

## 2024-05-13 PROCEDURE — 99215 OFFICE O/P EST HI 40 MIN: CPT | Mod: 95 | Performed by: PHYSICIAN ASSISTANT

## 2024-05-13 ASSESSMENT — SLEEP AND FATIGUE QUESTIONNAIRES
HOW LIKELY ARE YOU TO NOD OFF OR FALL ASLEEP WHILE WATCHING TV: MODERATE CHANCE OF DOZING
HOW LIKELY ARE YOU TO NOD OFF OR FALL ASLEEP WHILE SITTING QUIETLY AFTER LUNCH WITHOUT ALCOHOL: SLIGHT CHANCE OF DOZING
HOW LIKELY ARE YOU TO NOD OFF OR FALL ASLEEP WHEN YOU ARE A PASSENGER IN A CAR FOR AN HOUR WITHOUT A BREAK: SLIGHT CHANCE OF DOZING
HOW LIKELY ARE YOU TO NOD OFF OR FALL ASLEEP WHILE LYING DOWN TO REST IN THE AFTERNOON WHEN CIRCUMSTANCES PERMIT: HIGH CHANCE OF DOZING
HOW LIKELY ARE YOU TO NOD OFF OR FALL ASLEEP WHILE SITTING AND READING: MODERATE CHANCE OF DOZING
HOW LIKELY ARE YOU TO NOD OFF OR FALL ASLEEP WHILE SITTING AND TALKING TO SOMEONE: WOULD NEVER DOZE
HOW LIKELY ARE YOU TO NOD OFF OR FALL ASLEEP WHILE SITTING INACTIVE IN A PUBLIC PLACE: SLIGHT CHANCE OF DOZING
HOW LIKELY ARE YOU TO NOD OFF OR FALL ASLEEP IN A CAR, WHILE STOPPED FOR A FEW MINUTES IN TRAFFIC: WOULD NEVER DOZE

## 2024-05-13 NOTE — NURSING NOTE
Is the patient currently in the state of [place]? YES    Visit mode:VIDEO    If the visit is dropped, the patient can be reconnected by: VIDEO VISIT: Text to cell phone:   Telephone Information:   Mobile [phone]       Will anyone else be joining the visit? NO  (If patient encounters technical issues they should call [phone]    How would you like to obtain your AVS? MyChart    Are changes needed to the allergy or medication list? No    Are refills needed on medications prescribed by this physician? NO    Reason for visit: Consult    Francois MANE

## 2024-05-13 NOTE — PROGRESS NOTES
CPAP Follow-Up Visit:    Date on this visit: 5/13/2024    Doretha Yu has a follow-up visit today to review her CPAP use for STORMY and RLS.      Doretha Fernandez is a 46 year old female who had a sleep study for snoring, daytime sleepiness and abnormal overnight oximetry. Polysomnogram data obtained from [facility] in 1/2019 (241#)-AHI 24/hr (supine 72/hr, non-supine 14/hr), O2 gilma 86%, no periodic limb movement were noted, CPAP 8 cm/H20 was effective. She was started on CPAP 8 cmH2O in January 2019.    She presented to [facility] in 2020 to establish care for STORMY and  restless leg syndrome.    Doretha was last seen in 2/2022 by my colleague Sachin Machado PA-C in 2/2022. At that time, her pressures were increased from 8-15 cm to 10-16 cm.   She dropped weight from 245# to 192# and stopped using it because it did not fit right and it never seemed to help her energy. She presents today because she is always tired and has trouble sleeping at night. She has difficulty falling asleep and staying asleep. She dozes in the daytime. She says the other day, she dozed off for 3-4 hours in the morning with a cup of coffee in her hand. She often falls asleep after work for 1-3 hours.  She is not told that she snores or breathes funny now, but when she was in the hospital in 12/23-1/24, they did put her on oxygen due to desaturations in her sleep.  She likes to sleep on her side. She does sometimes wake on her back.   She has a prescription for hydroxyzine but almost never takes it as it makes her too groggy.       Do you use a CPAP Machine at home:   no  Overall, on a scale of 0-10 how would you rate your CPAP (0 poor, 10 great):      What type of mask do you use:   nasal    Typical bedtime:   9:30-10 PM. Sometimes she falls asleep from 5-7 or 8 PM and then she will be up until 11 PM to MN.   Sleep latency:    Typical wake time:  6:45 AM  Wakes 3 times per night for variable # of minutes. Reason for waking: empty her J-pouch.  Sometimes  she wakes at 4 AM and can't get back to sleep. If she avoids the sleep after work, she feels she is still half asleep when she gets up for the restroom.   How many hours are you sleeping per night:  4-5 hours  Do you feel well rested in the morning:  no    Naps: 3-5 times per week for up to 3 hours.     She just went back to work in February. She has been sleeping more after work. Prior to going back to work, her sleep schedule was about the same.  Weight change since sleep study: 192 lbs      She was on gabapentin 600 mg bid. She stopped that over a year ago to get her j-pouch. She was put on medical marijuana to help get off of it. She is not having RLS symptoms now.     Past medical/surgical history, family history, social history, medications and allergies were reviewed.      Problem List:  Patient Active Problem List    Diagnosis Date Noted    Type 2 diabetes mellitus without complication, without long-term current use of insulin (H) 02/26/2024     Priority: Medium    High output ileostomy (H) 10/30/2023     Priority: Medium    Post-op pain 09/13/2023     Priority: Medium    Gastroesophageal reflux disease without esophagitis 09/05/2023     Priority: Medium    Biliary dyskinesia 01/11/2023     Priority: Medium    Migraines      Priority: Medium    S/P colectomy 07/16/2021     Priority: Medium    Ulcerative colitis without complications, unspecified location (H) 06/03/2021     Priority: Medium    Adjustment disorder with mixed emotional features 06/16/2020     Priority: Medium    Polyarthralgia 04/29/2020     Priority: Medium    Drug-induced polyneuropathy (H24) 04/29/2020     Priority: Medium    Pain syndrome, chronic 02/12/2020     Priority: Medium    Drug-induced Cushing's syndrome (H24) 02/12/2020     Priority: Medium    High risk HPV infection 01/28/2020     Priority: Medium     Added automatically from request for surgery [id number]      Immunosuppression (H24) 01/28/2020     Priority: Medium     Added  automatically from request for surgery [id number]      STORMY (obstructive sleep apnea) 01/27/2020     Priority: Medium     1/2019 (241#)-AHI 24, lowest oxygen saturation was 86%, no periodic limb movement were noted, CPAP 8 cm/H20 was effective.      Secondary lymphedema 01/27/2020     Priority: Medium    Chronic neutrophilia 01/27/2020     Priority: Medium    Cervical high risk HPV (human papillomavirus) test positive 12/13/2019     Priority: Medium     2011 NIL pap.  2015 NIL pap, Neg HPV.  12/13/19 NIL pap , + HR HPV 16. Plan colp.   1/6/19 Failed colp exam secondary to anatomic constraints with gyn. Referred to gyn/onc.   2/25/20 Colpo bx and ECC Negative for dysplasia. Plan cotest in 1 year.   8/20/21 NIL pap, Neg HPV. Plan cotest in 1 year per provider.   9/28/22 Lost to follow-up for pap tracking   2/26/24 NIL pap, neg HPV with EM cells. Plan: cotest in 3 years      Immunosuppressed status (H24) 09/05/2019     Priority: Medium    Ulcerative colitis with complication, unspecified location (H) 09/05/2019     Priority: Medium    Morbid obesity (H) 09/05/2019     Priority: Medium    Arthritis, rheumatoid (H) 11/26/2018     Priority: Medium    Hypocalcemia 05/15/2018     Priority: Medium    Anemia 05/14/2018     Priority: Medium    Arthralgia of both knees 05/12/2018     Priority: Medium     Formatting of this note might be different from the original.  Work-up in progress. There is concern for inflammatory arthritis.      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/07/2017     Priority: Medium    Malignant melanoma of left upper extremity including  shoulder (H) 10/12/2017     Priority: Medium    Metastatic malignant melanoma (H) 10/10/2017     Priority: Medium    Prothrombin mutation (H24) 04/05/2017     Priority: Medium     On daily aspirin 81 mg per hematology's recommendations from 2008      Vision changes 04/01/2017     Priority: Medium    Mild intermittent asthma without complication 11/08/2013     Priority: Medium    Moderate major depression (H) 06/24/2013     Priority: Medium    Anxiety state 09/13/2012     Priority: Medium     Problem list name updated by automated process. Provider to review      Esophageal reflux 09/13/2012     Priority: Medium    DUB (dysfunctional uterine bleeding) 07/28/2011     Priority: Medium    Hyperlipidemia LDL goal <100 07/28/2011     Priority: Low        Impression/Plan:    (G47.33) STORMY (obstructive sleep apnea)  (primary encounter diagnosis)  Comment: Doretha was diagnosed with moderate STORMY in 2019 at [facility].  She lost to 55-60# and stopped using CPAP.  Her mask no longer fit correctly and she feels the machine was not working well.  She never felt improved energy with it.  She returns today with difficulty falling asleep and staying asleep and daytime sleepiness.  She states she is not told that she snores but may not be observed closely.  She was put on oxygen when in the hospital for desaturations at night.  However, she does not normally sleep on her back like she had to in the hospital.   Plan: Comprehensive Sleep Study        In-lab PSG.  She will work on sleep scheduling and contact me closer to the date of the sleep study if she desires a prescription for sleep aid for the test.    (G47.19) Excessive daytime sleepiness, (F51.04) Chronic insomnia  Comment: Doretha reports feeling excessively sleepy in the daytime, napping for hours either in the morning or after work.  She did not feel improved energy with using CPAP in the past.  She also has difficulty sleeping at night, so it is possible that her symptoms are a  function of a dysregulated sleep schedule.  Plan: She was advised to plan for a nap after work daily. Start setting an alarm at 90 minutes and reduce the duration of the nap every week by 30 minutes.        She will follow up with me in about 2 week(s) after the sleep study (or as soon as possible after that).     40 minutes were spent on the date of the encounter doing chart review, history and exam, documentation and further activities as noted above.     Bennett Goltz, PA-C    CC: Anna Naidu

## 2024-05-13 NOTE — PROGRESS NOTES
Virtual Visit Details    Type of service:  Video Visit   Video Start Time: 1:50 PM  Video End Time:2:20 PM    Originating Location (pt. Location): Home    Distant Location (provider location):  Off-site  Platform used for Video Visit: Gabriel

## 2024-05-17 ENCOUNTER — TELEPHONE (OUTPATIENT)
Dept: SLEEP MEDICINE | Facility: CLINIC | Age: 49
End: 2024-05-17
Payer: COMMERCIAL

## 2024-05-17 NOTE — TELEPHONE ENCOUNTER
Left message for patient to call back if interested in scheduling for sleep study.     Upon call back, please schedule patient for PSG diagnostic with follow up at soonest appointment with Bennett Goltz, PA-C.

## 2024-05-17 NOTE — NURSING NOTE
Left message for patient to call back to get scheduled for PSG diagnostic & soonest follow up with provider.

## 2024-06-10 ENCOUNTER — MYC MEDICAL ADVICE (OUTPATIENT)
Dept: GASTROENTEROLOGY | Facility: CLINIC | Age: 49
End: 2024-06-10
Payer: COMMERCIAL

## 2024-06-10 ENCOUNTER — LAB (OUTPATIENT)
Dept: LAB | Facility: CLINIC | Age: 49
End: 2024-06-10
Payer: COMMERCIAL

## 2024-06-10 ENCOUNTER — TELEPHONE (OUTPATIENT)
Dept: GASTROENTEROLOGY | Facility: CLINIC | Age: 49
End: 2024-06-10

## 2024-06-10 DIAGNOSIS — K51.90 ULCERATIVE COLITIS WITHOUT COMPLICATIONS, UNSPECIFIED LOCATION (H): ICD-10-CM

## 2024-06-10 DIAGNOSIS — K51.90 ULCERATIVE COLITIS WITHOUT COMPLICATIONS, UNSPECIFIED LOCATION (H): Primary | ICD-10-CM

## 2024-06-10 DIAGNOSIS — E11.9 TYPE 2 DIABETES MELLITUS WITHOUT COMPLICATION, WITHOUT LONG-TERM CURRENT USE OF INSULIN (H): Primary | ICD-10-CM

## 2024-06-10 LAB
ALBUMIN SERPL BCG-MCNC: 4.4 G/DL (ref 3.5–5.2)
ALP SERPL-CCNC: 80 U/L (ref 40–150)
ALT SERPL W P-5'-P-CCNC: 24 U/L (ref 0–50)
AST SERPL W P-5'-P-CCNC: 20 U/L (ref 0–45)
BASOPHILS # BLD AUTO: 0 10E3/UL (ref 0–0.2)
BASOPHILS NFR BLD AUTO: 0 %
BILIRUB DIRECT SERPL-MCNC: <0.2 MG/DL (ref 0–0.3)
BILIRUB SERPL-MCNC: <0.2 MG/DL
CRP SERPL-MCNC: 5.81 MG/L
EOSINOPHIL # BLD AUTO: 0.1 10E3/UL (ref 0–0.7)
EOSINOPHIL NFR BLD AUTO: 1 %
ERYTHROCYTE [DISTWIDTH] IN BLOOD BY AUTOMATED COUNT: 14.2 % (ref 10–15)
HBA1C MFR BLD: 5.6 % (ref 0–5.6)
HCT VFR BLD AUTO: 36.6 % (ref 35–47)
HGB BLD-MCNC: 11.9 G/DL (ref 11.7–15.7)
IMM GRANULOCYTES # BLD: 0 10E3/UL
IMM GRANULOCYTES NFR BLD: 0 %
LYMPHOCYTES # BLD AUTO: 2.5 10E3/UL (ref 0.8–5.3)
LYMPHOCYTES NFR BLD AUTO: 26 %
MCH RBC QN AUTO: 28 PG (ref 26.5–33)
MCHC RBC AUTO-ENTMCNC: 32.5 G/DL (ref 31.5–36.5)
MCV RBC AUTO: 86 FL (ref 78–100)
MONOCYTES # BLD AUTO: 0.7 10E3/UL (ref 0–1.3)
MONOCYTES NFR BLD AUTO: 7 %
NEUTROPHILS # BLD AUTO: 6.4 10E3/UL (ref 1.6–8.3)
NEUTROPHILS NFR BLD AUTO: 66 %
PLATELET # BLD AUTO: 296 10E3/UL (ref 150–450)
PROT SERPL-MCNC: 7.2 G/DL (ref 6.4–8.3)
RBC # BLD AUTO: 4.25 10E6/UL (ref 3.8–5.2)
WBC # BLD AUTO: 9.8 10E3/UL (ref 4–11)

## 2024-06-10 PROCEDURE — 85025 COMPLETE CBC W/AUTO DIFF WBC: CPT

## 2024-06-10 PROCEDURE — 86140 C-REACTIVE PROTEIN: CPT

## 2024-06-10 PROCEDURE — 36415 COLL VENOUS BLD VENIPUNCTURE: CPT

## 2024-06-10 PROCEDURE — 80076 HEPATIC FUNCTION PANEL: CPT

## 2024-06-10 PROCEDURE — 83036 HEMOGLOBIN GLYCOSYLATED A1C: CPT

## 2024-06-10 PROCEDURE — 87493 C DIFF AMPLIFIED PROBE: CPT | Mod: 59

## 2024-06-10 PROCEDURE — 83993 ASSAY FOR CALPROTECTIN FECAL: CPT

## 2024-06-10 PROCEDURE — 87507 IADNA-DNA/RNA PROBE TQ 12-25: CPT | Mod: 59

## 2024-06-10 NOTE — TELEPHONE ENCOUNTER
"Contacted patient to discuss symptoms.     Patient reports her baseline is typically 5 stools per day, which are somewhat formed.     Approximately 1 week ago symptoms started, and have been worsening.     Reports ongoing, \"constant\" watery diarrhea. Experiencing tenesmus and nocturnal stools. Reports she was up all night.     Denies any bloody stools or fevers, however has been experiencing intermittent body aches and chills since Thursday night.     Patient is nauseated, however has not vomited. Reports she is staying hydrated.    Also complains of some abdominal bloating and increased belching.    Reports moderate lower abdominal pain and rectal pain. States she feels the rectal pain \"inside,\" but is now also experiencing perianal discomfort/irritation due to frequent bowel movements. Is utilizing Calmoseptine ointment.     States she also experiences an uncomfortable abdominal pressure when she needs to void, states \"it's hard to explain.\"    Labs and stool studies ordered. Patient scheduled for lab appointment this afternoon.    Routed to Dr. Davey for an update.   "

## 2024-06-10 NOTE — TELEPHONE ENCOUNTER
[facility] Call Center    Phone Message    May a detailed message be left on voicemail: yes     Reason for Call: Symptoms or Concerns     If patient has red-flag symptoms, warm transfer to triage line    Current symptom or concern: Pouchitis    Symptoms have been present for:  1 week(s)    Has patient previously been seen for this? Yes    By : Dr. Davey    Date: 06/10    Are there any new or worsening symptoms? Yes: Pouchitis    Action Taken: Message routed to:  [facility] GI    Travel Screening: Not Applicable     Date of Service:

## 2024-06-11 LAB

## 2024-06-12 LAB — CALPROTECTIN STL-MCNT: 132 MG/KG (ref 0–49.9)

## 2024-06-17 ENCOUNTER — TELEPHONE (OUTPATIENT)
Dept: FAMILY MEDICINE | Facility: CLINIC | Age: 49
End: 2024-06-17
Payer: COMMERCIAL

## 2024-06-17 ENCOUNTER — TELEPHONE (OUTPATIENT)
Dept: GASTROENTEROLOGY | Facility: CLINIC | Age: 49
End: 2024-06-17
Payer: COMMERCIAL

## 2024-06-17 DIAGNOSIS — K43.9 ABDOMINAL WALL HERNIA: Primary | ICD-10-CM

## 2024-06-17 NOTE — TELEPHONE ENCOUNTER
MPRESSION:   1.  Right lower quadrant fat containing hernia. Abdominal wall defect  is superior to the previous ileostomy site.     2.  Proctocolectomy with ileal pouch anal anastomosis and interval  takedown of the right lower quadrant ileostomy. Mild presacral space  fat stranding potentially postoperative although could also reflect a  component of pouchitis in the appropriate clinical setting.     3.  Hyperdense 2.5 cm right adnexal mass, potentially a hemorrhagic  cyst but technically indeterminate. Consider further evaluation with  pelvic ultrasound.    Reviewed with Doretha warning signs of an incarcerated hernia such as nausea, vomiting, extreme pain, swelling at site, and absence of bowel movements. Referral to Dr. Anthony Bhatt to discuss surgery.

## 2024-06-17 NOTE — TELEPHONE ENCOUNTER
[facility] Call Center    Phone Message    May a detailed message be left on voicemail: yes     Reason for Call: Symptoms or Concerns     If patient has red-flag symptoms, warm transfer to triage line    Current symptom or concern: G-Pouch is popping out of hernia. She also is experiencing pain with this.    Symptoms have been present for:  3 day(s)    Has patient previously been seen for this? Yes    By: Dr. Davey    Date: 6/15/24    Are there any new or worsening symptoms? Yes: Worsening    Action Taken: Message routed to:  [facility] GI    Travel Screening: Not Applicable

## 2024-06-17 NOTE — TELEPHONE ENCOUNTER
Patient called c/o intestine and j-pouch issues since having Norovirus last week. Advised patient to contact gastro. Patient voiced understanding.      Reji GONZALEZ RN  [facility]

## 2024-06-17 NOTE — TELEPHONE ENCOUNTER
"Contacted patient to discuss symptoms.    Patient recently positive for Norovirus, however symptoms seem to be calming down. Reports approximately 7-8 bowel movements per day, baseline is 4-5 per day.     Reports intermittent diarrhea, some of the stools are now formed. Denies blood.     Patient reports throughout the past two days, every time she sits down to void or have a bowel movement (not bearing down) her small bowel \"pops out\" of her abdominal wall. States it is right where she was told the hernia is located.     States her abdomen is very sore from maneuvering it back in every time.     States the spot \"blows up like a balloon and I can hear things moving through it. I push it back in once I'm done using the bathroom.\"    CRS recommended a hernia specialist however at the time it was not urgent. She has completed the recommended CT scan, but has not yet scheduled the appointment with the specialist.     Patient expresses significant concern.     Routed to Dr. Davey and CRS team.   "

## 2024-06-19 ENCOUNTER — TELEPHONE (OUTPATIENT)
Dept: SURGERY | Facility: CLINIC | Age: 49
End: 2024-06-19
Payer: COMMERCIAL

## 2024-06-19 NOTE — TELEPHONE ENCOUNTER
Patient left VM message today at 2:54 p.m. stating she had seen Dr Carrillo in the past, is having issues with her hernia and would like to discuss scheduling surgery, last office visit was 2/23/2024. Message sent to Sylvia Keenan to call patient to discuss scheduling timeframe.

## 2024-06-21 ENCOUNTER — NURSE TRIAGE (OUTPATIENT)
Dept: FAMILY MEDICINE | Facility: CLINIC | Age: 49
End: 2024-06-21
Payer: COMMERCIAL

## 2024-06-21 DIAGNOSIS — E11.9 TYPE 2 DIABETES MELLITUS WITHOUT COMPLICATION, WITHOUT LONG-TERM CURRENT USE OF INSULIN (H): ICD-10-CM

## 2024-06-21 NOTE — TELEPHONE ENCOUNTER
Routing refill request to provider for review/approval because:  Drug not on the [facility] refill protocol     Pending Prescriptions:                       Disp   Refills    semaglutide (OZEMPIC) 2 MG/3ML pen        3 mL   0            Sig: Inject 0.5 mg Subcutaneous every 7 days      Requested Prescriptions   Pending Prescriptions Disp Refills    semaglutide (OZEMPIC) 2 MG/3ML pen 3 mL 0     Sig: Inject 0.5 mg Subcutaneous every 7 days       There is no refill protocol information for this order        Britany Keenan RN on 6/21/2024 at 1:34 PM

## 2024-06-21 NOTE — TELEPHONE ENCOUNTER
"Contacted patient for triage.   States she has felt increased depression for the past couple of months.   Denies thoughts of suicide or self harm. Does not have a plan. States \"I am scared of dying\". \"I want to live until I'm old\".   Does not have family or friends around her for support.   States she is sleeping 8-9 hours but would like to sleep more.   Having a very difficult time doing ADL's. Does not want to do anything.   Decreased appetite, is drinking fluids.   She is going to her cabin today and will have neighbors and friends there.   Does not have a therapist. Discharged due to doing well.   Denies alcohol or substance use.   Recent weight loss, is on Ozempic  0.5 mg.   Declines ER, \"I don't want anyone changing my medications except Dr. Naidu\".     Advised ER if worsening symptoms.     Ozempic Adverse effects per micromedex:  Psychiatric: Anxiety, Depression, Suicidal thoughts.     Asking if she should hold her Sunday dose until she sees Dr. Naidu on 6/26? Depression did start to worsen about the time she started Ozempic.     Please advise    [phone] okay to leave detailed message    Britany Keenan RN on 6/21/2024 at 2:35 PM    "

## 2024-06-21 NOTE — TELEPHONE ENCOUNTER
"Contacted patient for triage.     Reason for Disposition    [1] Depression AND [2] worsening (e.g., sleeping poorly, less able to do activities of daily living)    Answer Assessment - Initial Assessment Questions  1. CONCERN: \"What happened that made you call today?\"      Have been depressed for awhile now and now worsening, sleeping all the time  2. DEPRESSION SYMPTOM SCREENING: \"How are you feeling overall?\" (e.g., decreased energy, increased sleeping or difficulty sleeping, difficulty concentrating, feelings of sadness, guilt, hopelessness, or worthlessness)      No energy sleep all the time, yes feeling sad worthless hopeless  3. RISK OF HARM - SUICIDAL IDEATION:  \"Do you ever have thoughts of hurting or killing yourself?\"  (e.g., yes, no, no but preoccupation with thoughts about death)    - INTENT:  \"Do you have thoughts of hurting or killing yourself right NOW?\" (e.g., yes, no, N/A)    - PLAN: \"Do you have a specific plan for how you would do this?\" (e.g., gun, knife, overdose, no plan, N/A)      Scared of dying  4. RISK OF HARM - HOMICIDAL IDEATION:  \"Do you ever have thoughts of hurting or killing someone else?\"  (e.g., yes, no, no but preoccupation with thoughts about death)    - INTENT:  \"Do you have thoughts of hurting or killing someone right NOW?\" (e.g., yes, no, N/A)    - PLAN: \"Do you have a specific plan for how you would do this?\" (e.g., gun, knife, no plan, N/A)       denies  5. FUNCTIONAL IMPAIRMENT: \"How have things been going for you overall? Have you had more difficulty than usual doing your normal daily activities?\"  (e.g., better, same, worse; self-care, school, work, interactions)      worse  6. SUPPORT: \"Who is with you now?\" \"Who do you live with?\" \"Do you have family or friends who you can talk to?\"       Not really  7. THERAPIST: \"Do you have a counselor or therapist? Name?\"      Had one but said they didn't need one  8. STRESSORS: \"Has there been any new stress or recent changes in your " "life?\"      no  9. ALCOHOL USE OR SUBSTANCE USE (DRUG USE): \"Do you drink alcohol or use any illegal drugs?\"      denies  10. OTHER: \"Do you have any other physical symptoms right now?\" (e.g., fever)        Hernia problem with j pouch  11. PREGNANCY: \"Is there any chance you are pregnant?\" \"When was your last menstrual period?\"        denies    Protocols used: Depression-A-AH    "

## 2024-06-21 NOTE — TELEPHONE ENCOUNTER
Please have her hold her ozempic until she sees Dr. Naidu. Please provide her crisis resources and to ER if symptoms worsen. Thank you!    LA RUSH, DO

## 2024-06-21 NOTE — TELEPHONE ENCOUNTER
RN called and informed her of Dr. Lopez's message below.    Patient expressed understanding and will seek more urgent help if she is not able to be safe.    Rosemary Borden RN on 6/21/2024 at 4:06 PM

## 2024-06-21 NOTE — TELEPHONE ENCOUNTER
Called patient got her scheduled for virtual visit. 6/27/2024.  Patient is experiencing an increase in depression Sx and wanted to see Dr. aNidu today, but she is not in today.  I recommended that patient see another provider but she declined. Sent to RN to triage.

## 2024-06-24 ENCOUNTER — TELEPHONE (OUTPATIENT)
Dept: SURGERY | Facility: CLINIC | Age: 49
End: 2024-06-24
Payer: COMMERCIAL

## 2024-06-24 PROBLEM — K43.9 VENTRAL HERNIA WITHOUT OBSTRUCTION OR GANGRENE: Status: ACTIVE | Noted: 2024-02-23

## 2024-06-24 NOTE — TELEPHONE ENCOUNTER
Patient is scheduled for surgery with Dr. Carrillo    Spoke with: Doretha    Date of Surgery: 7/16/2024    Location: [facility]    Informed patient they will need an adult : Yes    Pre op with Provider n/a    H&P: Scheduled with PCP - Maricarmen Naidu MD    Additional imaging/appointments: n/a    Surgery packet: To be sent in the [place] mail     Additional comments: n/a        Sylvia Keenan on 6/24/2024 at 2:18 PM

## 2024-06-24 NOTE — TELEPHONE ENCOUNTER
Left voicemail for patient regarding scheduling surgery with Dr. Carrillo.    Provided contact number to discuss.    P: [phone]    __    Sylvia Keenan, on 6/24/2024 at 1:16 PM

## 2024-06-26 ENCOUNTER — VIRTUAL VISIT (OUTPATIENT)
Dept: FAMILY MEDICINE | Facility: CLINIC | Age: 49
End: 2024-06-26
Payer: COMMERCIAL

## 2024-06-26 DIAGNOSIS — J11.1 INFLUENZA-LIKE ILLNESS: ICD-10-CM

## 2024-06-26 DIAGNOSIS — F32.2 CURRENT SEVERE EPISODE OF MAJOR DEPRESSIVE DISORDER WITHOUT PSYCHOTIC FEATURES WITHOUT PRIOR EPISODE (H): Primary | ICD-10-CM

## 2024-06-26 PROCEDURE — 99213 OFFICE O/P EST LOW 20 MIN: CPT | Mod: 95 | Performed by: FAMILY MEDICINE

## 2024-06-26 RX ORDER — ARIPIPRAZOLE 5 MG/1
5 TABLET ORAL DAILY
Qty: 90 TABLET | Refills: 0 | Status: SHIPPED | OUTPATIENT
Start: 2024-06-26

## 2024-06-26 ASSESSMENT — PATIENT HEALTH QUESTIONNAIRE - PHQ9
5. POOR APPETITE OR OVEREATING: NOT AT ALL
SUM OF ALL RESPONSES TO PHQ QUESTIONS 1-9: 17

## 2024-06-26 ASSESSMENT — ANXIETY QUESTIONNAIRES
1. FEELING NERVOUS, ANXIOUS, OR ON EDGE: SEVERAL DAYS
7. FEELING AFRAID AS IF SOMETHING AWFUL MIGHT HAPPEN: NEARLY EVERY DAY
5. BEING SO RESTLESS THAT IT IS HARD TO SIT STILL: NOT AT ALL
3. WORRYING TOO MUCH ABOUT DIFFERENT THINGS: NOT AT ALL
6. BECOMING EASILY ANNOYED OR IRRITABLE: SEVERAL DAYS
2. NOT BEING ABLE TO STOP OR CONTROL WORRYING: NOT AT ALL
GAD7 TOTAL SCORE: 5
GAD7 TOTAL SCORE: 5

## 2024-06-26 NOTE — PROGRESS NOTES
"Doretha is a 49 year old who is being evaluated via a billable video visit.    How would you like to obtain your AVS? MyChart  If the video visit is dropped, the invitation should be resent by: Text to cell phone: [phone]  Will anyone else be joining your video visit? No      Assessment & Plan     Current severe episode of major depressive disorder without psychotic features without prior episode (H)  Continue Venlafaxine 150 mg twice daily   Increase ABilify to 5 mg daily  She will let me know in a few weeks how this is going, we may need to increase further.     Recommend she go back to her cousnelor  - ARIPiprazole (ABILIFY) 5 MG tablet; Take 1 tablet (5 mg) by mouth daily    Influenza-like illness  Will be doing home COVID testing  She will let me know if positive and if she would like Paxlovid          BMI  Estimated body mass index is 34.01 kg/m  as calculated from the following:    Height as of 5/13/24: 1.6 m (5' 3\").    Weight as of 5/13/24: 87.1 kg (192 lb).             Subjective   Doretha is a 49 year old, presenting for the following health issues:  Video Visit (Depression)        6/26/2024     2:22 PM   Additional Questions   Roomed by Ruth Ann WILKES CMA   Accompanied by Self       Video Start Time: 2:43 PM    HPI       - Fever, congestion and body aches x1 day She notes that temperatures are averaging 100.7. She also notes having a sore throat,  congestion, headaches, fullness in ears, goopy eyes and fatigue. She is taking Tylenol and Dayquil. She notes that her daughter is bringing her a Covid test tonight.    Depression and Anxiety   Abilify 2mg every day, effexor 150mg bid  How are you doing with your depression since your last visit? Worsened   How are you doing with your anxiety since your last visit?  No change  Are you having other symptoms that might be associated with depression or anxiety? Fatigue, crying  Have you had a significant life event? Unsure   Do you have any concerns with your use of " alcohol or other drugs? No    Saw me in April for exhaustion  Now crying a lot  Moods are low   In bed most of the time if not working        Social History     Tobacco Use    Smoking status: Former     Current packs/day: 0.00     Average packs/day: 1 pack/day for 5.0 years (5.0 ttl pk-yrs)     Types: Cigarettes     Start date: 3/20/1993     Quit date: 3/20/1998     Years since quittin.2     Passive exposure: Past    Smokeless tobacco: Never   Vaping Use    Vaping status: Never Used   Substance Use Topics    Alcohol use: Not Currently    Drug use: Yes     Types: Marijuana     Comment: vape, edible         2023    12:04 PM 2024     2:47 PM 2024     2:41 PM   PHQ   PHQ-9 Total Score 18 6 10   Q9: Thoughts of better off dead/self-harm past 2 weeks Not at all Not at all Not at all         2018     8:33 AM 2019     4:09 PM 10/31/2019    10:07 AM   SANDRA-7 SCORE   Total Score   19 (severe anxiety)   Total Score 13 13 19         2024     2:18 PM   Last PHQ-9   1.  Little interest or pleasure in doing things 2   2.  Feeling down, depressed, or hopeless 2   3.  Trouble falling or staying asleep, or sleeping too much 2   4.  Feeling tired or having little energy 3   5.  Poor appetite or overeating 3   6.  Feeling bad about yourself 2   7.  Trouble concentrating 3   8.  Moving slowly or restless 0   Q9: Thoughts of better off dead/self-harm past 2 weeks 0   PHQ-9 Total Score 17         2024     2:18 PM   SANDRA-7    1. Feeling nervous, anxious, or on edge 1   2. Not being able to stop or control worrying 0   3. Worrying too much about different things 0   4. Trouble relaxing 0   5. Being so restless that it is hard to sit still 0   6. Becoming easily annoyed or irritable 1   7. Feeling afraid, as if something awful might happen 3   SANDRA-7 Total Score 5       Suicide Assessment Five-step Evaluation and Treatment (SAFE-T)        Review of Systems  URI/fever/cough/ST started  yesterday       Objective           Vitals:  No vitals were obtained today due to virtual visit.    Physical Exam   GENERAL: alert and lying in bed  EYES: Eyes grossly normal to inspection.  No discharge or erythema, or obvious scleral/conjunctival abnormalities.  RESP: No audible wheeze, cough, or visible cyanosis.    SKIN: Visible skin clear. No significant rash, abnormal pigmentation or lesions.  NEURO: Cranial nerves grossly intact.  Mentation and speech appropriate for age.  PSYCH: Appropriate affect, tone, and pace of words          Video-Visit Details    Type of service:  Video Visit   Video End Time:2:53 PM  Originating Location (pt. Location): Home    Distant Location (provider location):  On-site  Platform used for Video Visit: Gabriel  Signed Electronically by: Anna Naidu MD

## 2024-06-27 ENCOUNTER — TELEPHONE (OUTPATIENT)
Dept: FAMILY MEDICINE | Facility: CLINIC | Age: 49
End: 2024-06-27

## 2024-06-27 DIAGNOSIS — U07.1 INFECTION DUE TO 2019 NOVEL CORONAVIRUS: Primary | ICD-10-CM

## 2024-06-27 NOTE — TELEPHONE ENCOUNTER
CALL patient.    Paxlovid sent to pharmacy for the next 5 days.    The only drug interaction is with the Abilify, the paxlovid can increase concentration of abilify, so I would not start the increase we talked about until AFTER the paxlovid is complete.    Anna Naidu M.D.

## 2024-06-27 NOTE — TELEPHONE ENCOUNTER
S-(situation): the patient was seen yesterday virtually.  She did test positive for Covid.    B-(background): the patient was seen virtually yesterday.    A-(assessment): the patient did test positive for Covid. The patient continues to have symptoms. Her symptoms are about the same as yesterday, however, she has developed a cough.  She was instructed to contact the clinic if she did test positive. The patient feels she is stable.  The patient denies any respiratory distress.    R-(recommendations): will send to provider to review.      Pharmacy pended. [facility].    Thank you    Polly BAJWA RN

## 2024-07-09 ENCOUNTER — TELEPHONE (OUTPATIENT)
Dept: FAMILY MEDICINE | Facility: CLINIC | Age: 49
End: 2024-07-09
Payer: COMMERCIAL

## 2024-07-09 NOTE — TELEPHONE ENCOUNTER
Reason for Call:  Appointment Request    Patient requesting this type of appt: Pre-op    Requested provider: Anna Naidu    Reason patient unable to be scheduled: Not within requested timeframe    When does patient want to be seen/preferred time: 1-2 days    Comments: Patient is having hernia repair on 07/16/2024 and needs a pre op done.     Could we send this information to you in [employer] or would you prefer to receive a phone call?:   Patient would prefer a phone call   Okay to leave a detailed message?: Yes at Cell number on file:    Telephone Information:   Mobile [phone]       Call taken on 7/9/2024 at 8:25 AM by Christiano Castro

## 2024-07-12 ENCOUNTER — OFFICE VISIT (OUTPATIENT)
Dept: SURGERY | Facility: CLINIC | Age: 49
End: 2024-07-12
Payer: COMMERCIAL

## 2024-07-12 VITALS
SYSTOLIC BLOOD PRESSURE: 111 MMHG | DIASTOLIC BLOOD PRESSURE: 76 MMHG | OXYGEN SATURATION: 99 % | WEIGHT: 196 LBS | HEART RATE: 83 BPM | HEIGHT: 63 IN | BODY MASS INDEX: 34.73 KG/M2

## 2024-07-12 DIAGNOSIS — K43.9 VENTRAL HERNIA WITHOUT OBSTRUCTION OR GANGRENE: Primary | ICD-10-CM

## 2024-07-12 PROCEDURE — 99213 OFFICE O/P EST LOW 20 MIN: CPT | Performed by: SURGERY

## 2024-07-12 ASSESSMENT — PAIN SCALES - GENERAL: PAINLEVEL: NO PAIN (0)

## 2024-07-12 NOTE — LETTER
7/12/2024       RE: Doretha Yu  [address]  [facility] [address]     Dear Colleague,    Thank you for referring your patient, Doretha Yu, to the [facility] [place] at [facility]. Please see a copy of my visit note below.    Gen Surg Follow-up    Patient pending robot assist VHR    4-6cm defect      3 prior MIS UC operations    Symptomatic midline bulge    Got over COVID and norovirus (asymptomatic)    ---    Plan for PAC then OR      ----    The risks of hernia repair were reviewed with the patient.    These risks combine the risks of abdominal surgery and the risks of hernia repair, including mesh implantation, and were described to the patient as follows:    Abdominal surgery risks:    These include, but are not limited to, death, myocardial infarction, pneumonia, urinary tract infection, deep venous thrombosis with or without pulmonary embolus, abdominal infection from bowel injury or abscess, bowel obstruction, wound infection, and bleeding.    Hernia repair risks:    Food and Drug Administration Comments on Hernia Repair Surgery and Mesh Implantation.    http://www.fda.gov/MedicalDevices/ProductsandMedicalProcedures/ImplantsandProsthetics/HerniaSurgicalMesh/default.htm      Hernia Repair Complications    Based on FDA s analysis of medical device adverse event reports and of peer-reviewed, scientific literature, the most common adverse events for all surgical repair of hernias--with or without mesh--are pain, infection, hernia recurrence, scar-like tissue that sticks tissues together (adhesion), blockage of the large or small intestine (obstruction), bleeding, abnormal connection between organs, vessels, or intestines (fistula), fluid build-up at the surgical site (seroma), and a hole in neighboring tissues or organs (perforation).    The most common adverse events following hernia repair with mesh are pain, infection,  hernia recurrence, adhesion, and bowel obstruction. Some other potential adverse events that can occur following hernia repair with mesh are mesh migration and mesh shrinkage (contraction).    Many complications related to hernia repair with surgical mesh that have been reported to the FDA have been associated with recalled mesh products that are no longer on the market. Pain, infection, recurrence, adhesion, obstruction, and perforation are the most common complications associated with recalled mesh. In the FDA s analysis of medical adverse event reports to the FDA, recalled mesh products were the main cause of bowel perforation and obstruction complications.    Please refer to the recall notices here and here for more information if you have recalled mesh. For more information on the recalled products, please visit the FDA Medical Device Recall website. Please visit the Medical & Radiation Emitting Device Database to search a specific type of surgical mesh.    If you are unsure about the specific mesh  and brand used in your surgery and have questions about your hernia repair, contact your surgeon or the facility where your surgery was performed to obtain the information from your medical record.           Again, thank you for allowing me to participate in the care of your patient.      Sincerely,    Arcadio Carrillo MD

## 2024-07-12 NOTE — PROGRESS NOTES
Gen Surg Follow-up    Patient pending robot assist VHR    4-6cm defect      3 prior MIS UC operations    Symptomatic midline bulge    Got over COVID and norovirus (asymptomatic)    ---    Plan for PAC then OR      ----    The risks of hernia repair were reviewed with the patient.    These risks combine the risks of abdominal surgery and the risks of hernia repair, including mesh implantation, and were described to the patient as follows:    Abdominal surgery risks:    These include, but are not limited to, death, myocardial infarction, pneumonia, urinary tract infection, deep venous thrombosis with or without pulmonary embolus, abdominal infection from bowel injury or abscess, bowel obstruction, wound infection, and bleeding.    Hernia repair risks:    Food and Drug Administration Comments on Hernia Repair Surgery and Mesh Implantation.    http://www.fda.gov/MedicalDevices/ProductsandMedicalProcedures/ImplantsandProsthetics/HerniaSurgicalMesh/default.htm      Hernia Repair Complications    Based on FDA s analysis of medical device adverse event reports and of peer-reviewed, scientific literature, the most common adverse events for all surgical repair of hernias--with or without mesh--are pain, infection, hernia recurrence, scar-like tissue that sticks tissues together (adhesion), blockage of the large or small intestine (obstruction), bleeding, abnormal connection between organs, vessels, or intestines (fistula), fluid build-up at the surgical site (seroma), and a hole in neighboring tissues or organs (perforation).    The most common adverse events following hernia repair with mesh are pain, infection, hernia recurrence, adhesion, and bowel obstruction. Some other potential adverse events that can occur following hernia repair with mesh are mesh migration and mesh shrinkage (contraction).    Many complications related to hernia repair with surgical mesh that have been reported to the FDA have been associated with  recalled mesh products that are no longer on the market. Pain, infection, recurrence, adhesion, obstruction, and perforation are the most common complications associated with recalled mesh. In the FDA s analysis of medical adverse event reports to the FDA, recalled mesh products were the main cause of bowel perforation and obstruction complications.    Please refer to the recall notices here and here for more information if you have recalled mesh. For more information on the recalled products, please visit the FDA Medical Device Recall website. Please visit the Medical & Radiation Emitting Device Database to search a specific type of surgical mesh.    If you are unsure about the specific mesh  and brand used in your surgery and have questions about your hernia repair, contact your surgeon or the facility where your surgery was performed to obtain the information from your medical record.

## 2024-07-12 NOTE — NURSING NOTE
"Chief Complaint   Patient presents with    Follow Up    Hernia       Vitals:    07/12/24 1009   BP: 111/76   BP Location: Left arm   Patient Position: Sitting   Cuff Size: Adult Regular   Pulse: 83   SpO2: 99%   Weight: 196 lb   Height: 5' 3\"       Body mass index is 34.72 kg/m .    Rukhsana Britt, EMT  "

## 2024-07-15 ENCOUNTER — OFFICE VISIT (OUTPATIENT)
Dept: FAMILY MEDICINE | Facility: CLINIC | Age: 49
End: 2024-07-15
Payer: COMMERCIAL

## 2024-07-15 ENCOUNTER — ANESTHESIA EVENT (OUTPATIENT)
Dept: SURGERY | Facility: AMBULATORY SURGERY CENTER | Age: 49
End: 2024-07-15
Payer: COMMERCIAL

## 2024-07-15 VITALS
DIASTOLIC BLOOD PRESSURE: 70 MMHG | TEMPERATURE: 97.5 F | RESPIRATION RATE: 18 BRPM | OXYGEN SATURATION: 99 % | BODY MASS INDEX: 34.2 KG/M2 | WEIGHT: 193 LBS | SYSTOLIC BLOOD PRESSURE: 124 MMHG | HEART RATE: 84 BPM | HEIGHT: 63 IN

## 2024-07-15 DIAGNOSIS — K43.9 VENTRAL HERNIA WITHOUT OBSTRUCTION OR GANGRENE: ICD-10-CM

## 2024-07-15 DIAGNOSIS — F32.1 CURRENT MODERATE EPISODE OF MAJOR DEPRESSIVE DISORDER WITHOUT PRIOR EPISODE (H): ICD-10-CM

## 2024-07-15 DIAGNOSIS — Z01.818 PREOP GENERAL PHYSICAL EXAM: Primary | ICD-10-CM

## 2024-07-15 DIAGNOSIS — E11.9 TYPE 2 DIABETES MELLITUS WITHOUT COMPLICATION, WITHOUT LONG-TERM CURRENT USE OF INSULIN (H): ICD-10-CM

## 2024-07-15 DIAGNOSIS — D68.52 PROTHROMBIN MUTATION (H): ICD-10-CM

## 2024-07-15 PROBLEM — E66.01 MORBID OBESITY (H): Chronic | Status: RESOLVED | Noted: 2019-09-05 | Resolved: 2024-07-15

## 2024-07-15 PROBLEM — G89.18 POST-OP PAIN: Status: RESOLVED | Noted: 2023-09-13 | Resolved: 2024-07-15

## 2024-07-15 PROBLEM — K82.8 BILIARY DYSKINESIA: Status: RESOLVED | Noted: 2023-01-11 | Resolved: 2024-07-15

## 2024-07-15 LAB
ANION GAP SERPL CALCULATED.3IONS-SCNC: 13 MMOL/L (ref 7–15)
BUN SERPL-MCNC: 12.7 MG/DL (ref 6–20)
CALCIUM SERPL-MCNC: 9.4 MG/DL (ref 8.6–10)
CHLORIDE SERPL-SCNC: 104 MMOL/L (ref 98–107)
CHOLEST SERPL-MCNC: 201 MG/DL
CREAT SERPL-MCNC: 0.8 MG/DL (ref 0.51–0.95)
CREAT UR-MCNC: 226 MG/DL
EGFRCR SERPLBLD CKD-EPI 2021: 90 ML/MIN/1.73M2
ERYTHROCYTE [DISTWIDTH] IN BLOOD BY AUTOMATED COUNT: 13.9 % (ref 10–15)
FASTING STATUS PATIENT QL REPORTED: NO
FASTING STATUS PATIENT QL REPORTED: NO
GLUCOSE SERPL-MCNC: 118 MG/DL (ref 70–99)
HCO3 SERPL-SCNC: 20 MMOL/L (ref 22–29)
HCT VFR BLD AUTO: 35.1 % (ref 35–47)
HDLC SERPL-MCNC: 49 MG/DL
HGB BLD-MCNC: 11.6 G/DL (ref 11.7–15.7)
LDLC SERPL CALC-MCNC: 116 MG/DL
MCH RBC QN AUTO: 28.3 PG (ref 26.5–33)
MCHC RBC AUTO-ENTMCNC: 33 G/DL (ref 31.5–36.5)
MCV RBC AUTO: 86 FL (ref 78–100)
MICROALBUMIN UR-MCNC: <12 MG/L
MICROALBUMIN/CREAT UR: NORMAL MG/G{CREAT}
NONHDLC SERPL-MCNC: 152 MG/DL
PLATELET # BLD AUTO: 340 10E3/UL (ref 150–450)
POTASSIUM SERPL-SCNC: 4.1 MMOL/L (ref 3.4–5.3)
RBC # BLD AUTO: 4.1 10E6/UL (ref 3.8–5.2)
SODIUM SERPL-SCNC: 137 MMOL/L (ref 135–145)
TRIGL SERPL-MCNC: 181 MG/DL
WBC # BLD AUTO: 8.2 10E3/UL (ref 4–11)

## 2024-07-15 PROCEDURE — 99214 OFFICE O/P EST MOD 30 MIN: CPT | Performed by: FAMILY MEDICINE

## 2024-07-15 PROCEDURE — 82043 UR ALBUMIN QUANTITATIVE: CPT | Performed by: FAMILY MEDICINE

## 2024-07-15 PROCEDURE — 80061 LIPID PANEL: CPT | Performed by: FAMILY MEDICINE

## 2024-07-15 PROCEDURE — 82570 ASSAY OF URINE CREATININE: CPT | Performed by: FAMILY MEDICINE

## 2024-07-15 PROCEDURE — 80048 BASIC METABOLIC PNL TOTAL CA: CPT | Performed by: FAMILY MEDICINE

## 2024-07-15 PROCEDURE — 85027 COMPLETE CBC AUTOMATED: CPT | Performed by: FAMILY MEDICINE

## 2024-07-15 PROCEDURE — 36415 COLL VENOUS BLD VENIPUNCTURE: CPT | Performed by: FAMILY MEDICINE

## 2024-07-15 ASSESSMENT — PAIN SCALES - GENERAL: PAINLEVEL: MODERATE PAIN (4)

## 2024-07-15 NOTE — PATIENT INSTRUCTIONS
How to Take Your Medication Before Surgery  Preoperative Medication Instructions   Antiplatelet or Anticoagulation Medication Instructions   - Patient is on no antiplatelet or anticoagulation medications.    Additional Medication Instructions  Take all scheduled medications on the day of surgery EXCEPT for modifications listed below:   - GLP-1 Injectable (exenitide, liraglutide, semaglutide, dulaglutide, etc.): DO NOT TAKE 7 days before surgery        Patient Education   Preparing for Your Surgery  Getting started  A nurse will call you to review your health history and instructions. They will give you an arrival time based on your scheduled surgery time. Please be ready to share:  Your doctor's clinic name and phone number  Your medical, surgical, and anesthesia history  A list of allergies and sensitivities  A list of medicines, including herbal treatments and over-the-counter drugs  Whether the patient has a legal guardian (ask how to send us the papers in advance)  Please tell us if you're pregnant--or if there's any chance you might be pregnant. Some surgeries may injure a fetus (unborn baby), so they require a pregnancy test. Surgeries that are safe for a fetus don't always need a test, and you can choose whether to have one.   If you have a child who's having surgery, please ask for a copy of Preparing for Your Child's Surgery.    Preparing for surgery  Within 10 to 30 days of surgery: Have a pre-op exam (sometimes called an H&P, or History and Physical). This can be done at a clinic or pre-operative center.  If you're having a , you may not need this exam. Talk to your care team.  At your pre-op exam, talk to your care team about all medicines you take. If you need to stop any medicines before surgery, ask when to start taking them again.  We do this for your safety. Many medicines can make you bleed too much during surgery. Some change how well surgery (anesthesia) drugs work.  Call your insurance  company to let them know you're having surgery. (If you don't have insurance, call [phone].)  Call your clinic if there's any change in your health. This includes signs of a cold or flu (sore throat, runny nose, cough, rash, fever). It also includes a scrape or scratch near the surgery site.  If you have questions on the day of surgery, call your hospital or surgery center.  Eating and drinking guidelines  For your safety: Unless your surgeon tells you otherwise, follow the guidelines below.  Eat and drink as usual until 8 hours before you arrive for surgery. After that, no food or milk.  Drink clear liquids until 2 hours before you arrive. These are liquids you can see through, like water, Gatorade, and Propel Water. They also include plain black coffee and tea (no cream or milk), candy, and breath mints. You can spit out gum when you arrive.  If you drink alcohol: Stop drinking it the night before surgery.  If your care team tells you to take medicine on the morning of surgery, it's okay to take it with a sip of water.  Preventing infection  Shower or bathe the night before and morning of your surgery. Follow the instructions your clinic gave you. (If no instructions, use regular soap.)  Don't shave or clip hair near your surgery site. We'll remove the hair if needed.  Don't smoke or vape the morning of surgery. You may chew nicotine gum up to 2 hours before surgery. A nicotine patch is okay.  Note: Some surgeries require you to completely quit smoking and nicotine. Check with your surgeon.  Your care team will make every effort to keep you safe from infection. We will:  Clean our hands often with soap and water (or an alcohol-based hand rub).  Clean the skin at your surgery site with a special soap that kills germs.  Give you a special gown to keep you warm. (Cold raises the risk of infection.)  Wear special hair covers, masks, gowns and gloves during surgery.  Give antibiotic medicine, if prescribed. Not  all surgeries need antibiotics.  What to bring on the day of surgery  Photo ID and insurance card  Copy of your health care directive, if you have one  Glasses and hearing aids (bring cases)  You can't wear contacts during surgery  Inhaler and eye drops, if you use them (tell us about these when you arrive)  CPAP machine or breathing device, if you use them  A few personal items, if spending the night  If you have . . .  A pacemaker, ICD (cardiac defibrillator) or other implant: Bring the ID card.  An implanted stimulator: Bring the remote control.  A legal guardian: Bring a copy of the certified (court-stamped) guardianship papers.  Please remove any jewelry, including body piercings. Leave jewelry and other valuables at home.  If you're going home the day of surgery  You must have a responsible adult drive you home. They should stay with you overnight as well.  If you don't have someone to stay with you, and you aren't safe to go home alone, we may keep you overnight. Insurance often won't pay for this.  After surgery  If it's hard to control your pain or you need more pain medicine, please call your surgeon's office.  Questions?   If you have any questions for your care team, list them here: _________________________________________________________________________________________________________________________________________________________________________ ____________________________________ ____________________________________ ____________________________________  For informational purposes only. Not to replace the advice of your health care provider. Copyright   2003, 2019 [facility]. All rights reserved. Clinically reviewed by Karsisa Landry MD. [employer] [id number] - REV 12/22.

## 2024-07-15 NOTE — PROGRESS NOTES
Preoperative Evaluation  [facility]  [id number]  [facility] [address]  Phone: [phone]  Primary Provider: Anna Naidu MD  Pre-op Performing Provider: Anna Naidu MD  Jul 15, 2024             7/15/2024   Surgical Information   What procedure is being done? hernia   Facility or Hospital where procedure/surgery will be performed: [facility]   Who is doing the procedure / surgery? wise   Date of surgery / procedure: [id number]   Time of surgery / procedure: 840   Where do you plan to recover after surgery? at home with family        Fax number for surgical facility: Note does not need to be faxed, will be available electronically in Epic.    Assessment & Plan     The proposed surgical procedure is considered INTERMEDIATE risk.    Preop general physical exam     - Basic metabolic panel  (Ca, Cl, CO2, Creat, Gluc, K, Na, BUN); Future  - CBC with platelets; Future    Ventral hernia without obstruction or gangrene     - Basic metabolic panel  (Ca, Cl, CO2, Creat, Gluc, K, Na, BUN); Future  - CBC with platelets; Future    Type 2 diabetes mellitus without complication, without long-term current use of insulin (H)  Resolved really with weight loss and being off the chronic steroids which was causing most of the problem.   - Lipid panel reflex to direct LDL Non-fasting; Future  - Albumin Random Urine Quantitative with Creat Ratio; Future  - Basic metabolic panel  (Ca, Cl, CO2, Creat, Gluc, K, Na, BUN); Future  - CBC with platelets; Future    Prothrombin mutation (H24)   history of thrombotic postpartum CVA in 2004 and RUE phlebitis, but no history of DVT or PE. +prothrombin deficiency though. Seen by a provider in the Bleeding and Clotting clinic in December They noted no indication for long term anticoagulation. Routine thrombosis prevention during surgery and post-op admission.   - Basic metabolic panel  (Ca, Cl, CO2, Creat, Gluc, K, Na, BUN); Future  - CBC with  platelets; Future    Current moderate episode of major depressive disorder without prior episode (H)   stable            - No identified additional risk factors other than previously addressed    Antiplatelet or Anticoagulation Medication Instructions   - Patient is on no antiplatelet or anticoagulation medications.    Additional Medication Instructions  Take all scheduled medications on the day of surgery EXCEPT for modifications listed below:   - GLP-1 Injectable (exenitide, liraglutide, semaglutide, dulaglutide, etc.): DO NOT TAKE 7 days before surgery   Last dose of this was a few weeks ago    Recommendation  Approval given to proceed with proposed procedure, without further diagnostic evaluation.    Lance Coyne is a 49 year old, presenting for the following:  Pre-Op Exam          7/15/2024     8:51 AM   Additional Questions   Roomed by Ruth Ann raymond CMA   Accompanied by Self     HPI related to upcoming procedure:   Doretha Yu is a 48 year old female with sleep apnea, asthma, history of postpartum thromboembolic CVA (2004), chemo related neuropathy, obesity, diabetes, depression, anxiety, chemo related inflammatory arthritis, history of malignant melanoma, history of BCC and prothrombin deficiency that is s/p TAC with end ileostomy (laparoscopic) on 6/15/21 and transanal total mesorectal excision, completion proctectomy and ileal pouch anastamosis on 9/5/23 for 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 Takedown of ileostomy in December 20203.      Now with 4-6 cm ventral hernia that started after takedown.  She has pain and discomfort with bowel movements.             7/15/2024   Pre-Op Questionnaire   Have you ever had a heart attack or stroke? (!) YES during pregnancy/labor   Have you ever had surgery on your heart or blood vessels, such as a stent placement, a coronary artery bypass, or surgery on an artery in your head, neck, heart, or legs? No   Do you have chest pain with activity? No   Do you have a  history of heart failure? No   Do you currently have a cold, bronchitis or symptoms of other infection? No   Do you have a cough, shortness of breath, or wheezing? No   Do you or anyone in your family have previous history of blood clots? (!) UNKNOWN personal history of clots. Has seen hematology - prothrombin mutation, told she didn't need anticoagulation   Do you or does anyone in your family have a serious bleeding problem such as prolonged bleeding following surgeries or cuts? (!) YES     Have you ever had problems with anemia or been told to take iron pills? (!) YES     Have you had any abnormal blood loss such as black, tarry or bloody stools, or abnormal vaginal bleeding? NO   Have you ever had a blood transfusion? No   Are you willing to have a blood transfusion if it is medically needed before, during, or after your surgery? Yes   Have you or any of your relatives ever had problems with anesthesia?  NO   Do you have sleep apnea, excessive snoring or daytime drowsiness? (!) YES   Do you have a CPAP machine? (!) NO - not using CPAP, has f/up soon with sleep medicine.  Has lost significant weight.    Do you have any artifical heart valves or other implanted medical devices like a pacemaker, defibrillator, or continuous glucose monitor? No   Do you have artificial joints? No   Are you allergic to latex? No        Health Care Directive  Patient has a Health Care Directive on file      Preoperative Review of    reviewed - controlled substances reflected in medication list.      Status of Chronic Conditions:  See problem list for active medical problems.  Problems all longstanding and stable, except as noted/documented.  See ROS for pertinent symptoms related to these conditions.    Patient Active Problem List    Diagnosis Date Noted    Type 2 diabetes mellitus without complication, without long-term current use of insulin (H) 02/26/2024     Priority: Medium    Ventral hernia without obstruction or gangrene  02/23/2024     Priority: Medium    High output ileostomy (H) 10/30/2023     Priority: Medium    Post-op pain 09/13/2023     Priority: Medium    Gastroesophageal reflux disease without esophagitis 09/05/2023     Priority: Medium    Biliary dyskinesia 01/11/2023     Priority: Medium    Migraines      Priority: Medium    S/P colectomy 07/16/2021     Priority: Medium    Ulcerative colitis without complications, unspecified location (H) 06/03/2021     Priority: Medium    Adjustment disorder with mixed emotional features 06/16/2020     Priority: Medium    Polyarthralgia 04/29/2020     Priority: Medium    Drug-induced polyneuropathy (H24) 04/29/2020     Priority: Medium    Pain syndrome, chronic 02/12/2020     Priority: Medium    Drug-induced Cushing's syndrome (H24) 02/12/2020     Priority: Medium    High risk HPV infection 01/28/2020     Priority: Medium     Added automatically from request for surgery [id number]      Immunosuppression (H24) 01/28/2020     Priority: Medium     Added automatically from request for surgery [id number]      STORMY (obstructive sleep apnea) 01/27/2020     Priority: Medium     1/2019 (241#)-AHI 24, lowest oxygen saturation was 86%, no periodic limb movement were noted, CPAP 8 cm/H20 was effective.      Secondary lymphedema 01/27/2020     Priority: Medium    Chronic neutrophilia 01/27/2020     Priority: Medium    Cervical high risk HPV (human papillomavirus) test positive 12/13/2019     Priority: Medium     2011 NIL pap.  2015 NIL pap, Neg HPV.  12/13/19 NIL pap , + HR HPV 16. Plan colp.   1/6/19 Failed colp exam secondary to anatomic constraints with gyn. Referred to gyn/onc.   2/25/20 Colpo bx and ECC Negative for dysplasia. Plan cotest in 1 year.   8/20/21 NIL pap, Neg HPV. Plan cotest in 1 year per provider.   9/28/22 Lost to follow-up for pap tracking   2/26/24 NIL pap, neg HPV with EM cells. Plan: cotest in 3 years      Immunosuppressed status (H24) 09/05/2019     Priority: Medium    Ulcerative  colitis with complication, unspecified location (H) 09/05/2019     Priority: Medium    Morbid obesity (H) 09/05/2019     Priority: Medium    Arthritis, rheumatoid (H) 11/26/2018     Priority: Medium    Hypocalcemia 05/15/2018     Priority: Medium    Anemia 05/14/2018     Priority: Medium    Arthralgia of both knees 05/12/2018     Priority: Medium     Formatting of this note might be different from the original.  Work-up in progress. There is concern for inflammatory arthritis.      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/07/2017     Priority: Medium    Malignant melanoma of left upper extremity including shoulder (H) 10/12/2017     Priority: Medium    Metastatic malignant melanoma (H) 10/10/2017     Priority: Medium    Prothrombin mutation (H24) 04/05/2017     Priority: Medium     On daily aspirin 81 mg per hematology's recommendations from 2008      Vision changes 04/01/2017     Priority: Medium    Mild intermittent asthma without complication 11/08/2013     Priority: Medium    Moderate major depression (H) 06/24/2013     Priority: Medium    Anxiety state 09/13/2012     Priority: Medium     Problem list name updated by automated process. Provider to review      Esophageal reflux 09/13/2012     Priority: Medium    DUB (dysfunctional uterine bleeding) 07/28/2011     Priority: Medium    Hyperlipidemia LDL goal <100 07/28/2011     Priority: Low      Past Medical History:   Diagnosis Date    Abnormal MRI     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     Anxiety     Basal cell carcinoma     Cervical high risk HPV (human papillomavirus)  test positive 2019    See problem list    Colitis     Depression     Diabetes mellitus, iatrogenic (H) 2020    Esophageal reflux     Inflammatory arthritis     Insomnia     Intestinal giardiasis 2018    Lumbago     left lower back pain    Lymphedema     Malignant melanoma (H)     Melanoma (H) 10/23/2017    Migraines     Mild persistent asthma     Morbid obesity with BMI of 40.0-44.9, adult (H)     STORMY (obstructive sleep apnea)     Prothrombin deficiency (H)     takes 81mg asa daily    Stroke (cerebrum) (H)     During     TIA (transient ischemic attack)     Type 2 diabetes mellitus (H)     Ulcerative pancolitis (H)      Past Surgical History:   Procedure Laterality Date    APPENDECTOMY      COLONOSCOPY N/A 10/18/2017    Procedure: COLONOSCOPY;  Colon;  Surgeon: Debbie Stephens MD;  Location: [facility]    COLONOSCOPY N/A 2018    Procedure: COMBINED COLONOSCOPY, SINGLE OR MULTIPLE BIOPSY/POLYPECTOMY BY BIOPSY;  colon;  Surgeon: Benita Schumacher MD;  Location: [facility]    COLONOSCOPY      multiple since  to present - about 6 total    DAVINCI ASSISTED TRANSANAL TOTAL MESORECTAL EXCISION N/A 2023    Procedure: COMPLETION PROCTECTOMY, ROBOT-ASSISTED, ILEAL POUCH ANASTAMOSIS;  Surgeon: Quinton Ram MD;  Location: [facility] OR    DISSECT LYMPH NODE AXILLA Left 10/23/2017    Procedure: DISSECT LYMPH NODE AXILLA;  Left Axillary Lymph Node Dissection ;  Surgeon: Laurent Cool MD;  Location: [facility]    EXAM UNDER ANESTHESIA PELVIC N/A 2020    Procedure: EXAM UNDER ANESTHESIA, PELVIS; with Cervical Biopsies, Vaginal Biopsy and Endocervical Curettings;  Surgeon: Melina Jung MD;  Location: [facility] OR    GYN SURGERY  ,         LAPAROSCOPIC ASSISTED COLECTOMY N/A 06/15/2021    Procedure: laparoscopic total abdominal colectomy, end ileostomy;  Surgeon: Quinton Ram MD;  Location: [facility]    REPAIR MOHS Left 2017    Procedure: REPAIR MOHS;  Left  Upper Lid Moh's Reconstruction;  Surgeon: Kisha Bosch MD;  Location: [facility]    SIGMOIDOSCOPY FLEXIBLE N/A 9/5/2023    Procedure: Sigmoidoscopy flexible;  Surgeon: Quinton Ram MD;  Location: [facility]    TAKEDOWN ILEOSTOMY N/A 12/28/2023    Procedure: CLOSURE, ILEOSTOMY;  Surgeon: Quinton Ram MD;  Location: [facility]     Current Outpatient Medications   Medication Sig Dispense Refill    ARIPiprazole (ABILIFY) 5 MG tablet Take 1 tablet (5 mg) by mouth daily 90 tablet 0    venlafaxine (EFFEXOR) 75 MG tablet Take 2 tablets (150 mg) by mouth 2 times daily 360 tablet 3    acetaminophen (TYLENOL) 325 MG tablet Take 3 tablets (975 mg) by mouth 3 times daily      acetaminophen (TYLENOL) 500 MG tablet Take 1,000 mg by mouth every 6 hours as needed for mild pain      diphenoxylate-atropine (LOMOTIL) 2.5-0.025 MG tablet Take 2 tablets by mouth 2 times daily as needed for diarrhea 240 tablet 5    hydrOXYzine (ATARAX) 25 MG tablet Take 1 tablet (25 mg) by mouth 2 times daily (Patient not taking: Reported on 7/12/2024) 60 tablet 1    loperamide (IMODIUM) 2 MG capsule Take 1 capsule (2 mg) by mouth 4 times daily as needed for diarrhea Up to 8/day 240 capsule 11    medical cannabis (Patient's own supply)  (Patient not taking: Reported on 6/26/2024)      medical cannabis (Patient's own supply) See Admin Instructions (The purpose of this order is to document that the patient reports taking medical cannabis.  This is not a prescription, and is not used to certify that the patient has a qualifying medical condition.) (Patient not taking: Reported on 6/26/2024)      methylcellulose (CITRUCEL) powder Take 0.55 g (1.925 teaspoonful) by mouth daily (Patient not taking: Reported on 6/26/2024) 454 g 3    omeprazole (PRILOSEC) 40 MG DR capsule Take 1 capsule (40 mg) by mouth daily (Patient not taking: Reported on 6/26/2024) 60 capsule 1    ondansetron (ZOFRAN ODT) 4 MG ODT tab DISSOLVE ONE TABLET BY MOUTH EVERY 8  HOURS AS NEEDED FOR NAUSEA. 20 tablet 1    Rectal Protectant-Emollient (CALMOL-4) 76-10 % SUPP Place 1 suppository rectally 2 times daily as needed (anal irritation) 24 suppository 11    semaglutide (OZEMPIC) 2 MG/3ML pen Inject 0.5 mg Subcutaneous every 7 days (Patient not taking: Reported on 2024) 3 mL 0    semaglutide (OZEMPIC) 2 MG/3ML pen Inject 0.25 mg Subcutaneous every 7 days (Patient not taking: Reported on 2024) 3 mL 0    simethicone (MYLICON) 125 MG chewable tablet Take 1 tablet (125 mg) by mouth 2 times daily as needed for intestinal gas (Patient not taking: Reported on 2024) 10 tablet 0       Allergies   Allergen Reactions    Bee Venom Swelling    Azithromycin Diarrhea    Erythromycin      Other reaction(s): GI intolerance, Vomiting    Fentanyl Other (See Comments)     sweating  sweating    Prochlorperazine Fatigue     Other reaction(s): Other (see comments)  Fatigue    Buspirone      Other reaction(s): GI intolerance  vomiting    Erythrocin Nausea and Vomiting    Gluten Meal      Celiac disease    Topamax [Topiramate]      Made her lethargic    Zithromax [Azithromycin Dihydrate] Diarrhea    Enbrel [Etanercept] Hives and Rash        Social History     Tobacco Use    Smoking status: Former     Current packs/day: 0.00     Average packs/day: 1 pack/day for 5.0 years (5.0 ttl pk-yrs)     Types: Cigarettes     Start date: 3/20/1993     Quit date: 3/20/1998     Years since quittin.3     Passive exposure: Past    Smokeless tobacco: Never   Substance Use Topics    Alcohol use: Not Currently     Family History   Problem Relation Age of Onset    Cancer Mother 45        lung    Neurologic Disorder Mother         epilepsy    Lipids Father     Gastrointestinal Disease Father         diverticulitis     Depression Father     Colitis Father     Colon Cancer Father     Diverticulitis Father     Depression Sister     Cancer Maternal Grandmother     Blood Disease Maternal Grandmother         lymphoma   "   Arthritis Maternal Grandmother     Diabetes Maternal Grandmother     Depression Maternal Grandmother     Macular Degeneration Maternal Grandmother     Glaucoma Maternal Grandmother     Diabetes Maternal Grandfather     Cerebrovascular Disease Maternal Grandfather     Blood Disease Maternal Grandfather     Heart Disease Maternal Grandfather     Glaucoma Maternal Grandfather     Cancer Paternal Grandmother     Cancer - colorectal Paternal Grandmother     Colitis Paternal Grandmother     Colon Cancer Paternal Grandmother     Diverticulitis Paternal Grandmother     Respiratory Paternal Grandfather         emphysema     Colitis Paternal Grandfather     Colon Cancer Paternal Grandfather     Diverticulitis Paternal Grandfather     Heart Disease Daughter     Asthma Daughter     Melanoma No family hx of     Anesthesia Reaction No family hx of     Clotting Disorder No family hx of      History   Drug Use    Types: Marijuana     Comment: vape, edible             Review of Systems  Constitutional, HEENT, cardiovascular, pulmonary, gi and gu systems are negative, except as otherwise noted.    Objective    /70   Pulse 84   Temp 97.5  F (36.4  C) (Tympanic)   Resp 18   Ht 1.6 m (5' 3\")   Wt 87.5 kg (193 lb)   LMP 07/03/2024 (Approximate)   SpO2 99%   BMI 34.19 kg/m     Estimated body mass index is 34.19 kg/m  as calculated from the following:    Height as of this encounter: 1.6 m (5' 3\").    Weight as of this encounter: 87.5 kg (193 lb).  Physical Exam  GENERAL: alert and no distress  NECK: no adenopathy, no asymmetry, masses, or scars  RESP: lungs clear to auscultation - no rales, rhonchi or wheezes  CV: regular rate and rhythm, normal S1 S2, no S3 or S4, no murmur, click or rub, no peripheral edema  ABDOMEN: soft, nontender, without hepatosplenomegaly or masses and hernia ventral 4-6 cm in diameter  MS: no gross musculoskeletal defects noted, no edema    Recent Labs   Lab Test 06/10/24  1406 01/29/24  1252 " 12/29/23  0649 10/30/23  1437 10/30/23  1436 09/14/23  1048 09/13/23  1411   HGB 11.9 10.5* 10.1*   < >  --    < > 10.8*    359 351   < >  --    < > 461*   INR  --   --   --   --  1.07  --  0.99   NA  --  137 138   < > 131*   < > 138   POTASSIUM  --  3.9 4.0   < > 3.5   < > 4.0   CR  --  0.69 0.73   < > 0.83   < > 0.83   A1C 5.6 5.8*  --   --   --    < >  --     < > = values in this interval not displayed.        Diagnostics  Labs pending at this time.  Results will be reviewed when available.   No EKG required, no history of coronary heart disease, significant arrhythmia, peripheral arterial disease or other structural heart disease.    Revised Cardiac Risk Index (RCRI)  The patient has the following serious cardiovascular risks for perioperative complications:   - No serious cardiac risks = 0 points     RCRI Interpretation: 0 points: Class I (very low risk - 0.4% complication rate)         Signed Electronically by: Anna Naidu MD  Copy of this evaluation report is provided to requesting physician.

## 2024-07-16 ENCOUNTER — HOSPITAL ENCOUNTER (OUTPATIENT)
Facility: AMBULATORY SURGERY CENTER | Age: 49
Discharge: HOME | End: 2024-07-16
Attending: SURGERY
Payer: COMMERCIAL

## 2024-07-16 ENCOUNTER — ANESTHESIA (OUTPATIENT)
Dept: SURGERY | Facility: AMBULATORY SURGERY CENTER | Age: 49
End: 2024-07-16
Payer: COMMERCIAL

## 2024-07-16 VITALS
HEART RATE: 84 BPM | SYSTOLIC BLOOD PRESSURE: 113 MMHG | WEIGHT: 193 LBS | BODY MASS INDEX: 34.2 KG/M2 | HEIGHT: 63 IN | RESPIRATION RATE: 12 BRPM | OXYGEN SATURATION: 95 % | DIASTOLIC BLOOD PRESSURE: 66 MMHG | TEMPERATURE: 96.9 F

## 2024-07-16 DIAGNOSIS — K43.9 VENTRAL HERNIA WITHOUT OBSTRUCTION OR GANGRENE: Primary | ICD-10-CM

## 2024-07-16 LAB
GLUCOSE BLDC GLUCOMTR-MCNC: 92 MG/DL (ref 70–99)
HCG UR QL: NEGATIVE
INTERNAL QC OK POCT: NORMAL
POCT KIT EXPIRATION DATE: NORMAL
POCT KIT LOT NUMBER: NORMAL

## 2024-07-16 PROCEDURE — 49593 RPR AA HRN 1ST 3-10 RDC: CPT | Performed by: SURGERY

## 2024-07-16 PROCEDURE — 81025 URINE PREGNANCY TEST: CPT | Performed by: PATHOLOGY

## 2024-07-16 PROCEDURE — 49593 RPR AA HRN 1ST 3-10 RDC: CPT | Performed by: ANESTHESIOLOGY

## 2024-07-16 PROCEDURE — 49593 RPR AA HRN 1ST 3-10 RDC: CPT

## 2024-07-16 PROCEDURE — S2900 ROBOTIC SURGICAL SYSTEM: HCPCS | Performed by: SURGERY

## 2024-07-16 PROCEDURE — 82962 GLUCOSE BLOOD TEST: CPT | Performed by: PATHOLOGY

## 2024-07-16 RX ORDER — ACETAMINOPHEN 325 MG/1
975 TABLET ORAL ONCE
Status: DISCONTINUED | OUTPATIENT
Start: 2024-07-16 | End: 2024-07-17 | Stop reason: HOSPADM

## 2024-07-16 RX ORDER — LIDOCAINE HYDROCHLORIDE 20 MG/ML
INJECTION, SOLUTION INFILTRATION; PERINEURAL PRN
Status: DISCONTINUED | OUTPATIENT
Start: 2024-07-16 | End: 2024-07-16

## 2024-07-16 RX ORDER — ALBUTEROL SULFATE 0.83 MG/ML
2.5 SOLUTION RESPIRATORY (INHALATION) EVERY 4 HOURS PRN
Status: DISCONTINUED | OUTPATIENT
Start: 2024-07-16 | End: 2024-07-16 | Stop reason: HOSPADM

## 2024-07-16 RX ORDER — ONDANSETRON 2 MG/ML
INJECTION INTRAMUSCULAR; INTRAVENOUS PRN
Status: DISCONTINUED | OUTPATIENT
Start: 2024-07-16 | End: 2024-07-16

## 2024-07-16 RX ORDER — FENTANYL CITRATE 50 UG/ML
INJECTION, SOLUTION INTRAMUSCULAR; INTRAVENOUS PRN
Status: DISCONTINUED | OUTPATIENT
Start: 2024-07-16 | End: 2024-07-16

## 2024-07-16 RX ORDER — HYDRALAZINE HYDROCHLORIDE 20 MG/ML
2.5-5 INJECTION INTRAMUSCULAR; INTRAVENOUS EVERY 10 MIN PRN
Status: DISCONTINUED | OUTPATIENT
Start: 2024-07-16 | End: 2024-07-16 | Stop reason: HOSPADM

## 2024-07-16 RX ORDER — OXYCODONE HYDROCHLORIDE 5 MG/1
5-10 TABLET ORAL EVERY 4 HOURS PRN
Qty: 20 TABLET | Refills: 0 | Status: SHIPPED | OUTPATIENT
Start: 2024-07-16

## 2024-07-16 RX ORDER — KETOROLAC TROMETHAMINE 30 MG/ML
30 INJECTION, SOLUTION INTRAMUSCULAR; INTRAVENOUS ONCE
Status: COMPLETED | OUTPATIENT
Start: 2024-07-16 | End: 2024-07-16

## 2024-07-16 RX ORDER — ONDANSETRON 4 MG/1
4 TABLET, ORALLY DISINTEGRATING ORAL EVERY 30 MIN PRN
Status: DISCONTINUED | OUTPATIENT
Start: 2024-07-16 | End: 2024-07-17 | Stop reason: HOSPADM

## 2024-07-16 RX ORDER — KETOROLAC TROMETHAMINE 30 MG/ML
15 INJECTION, SOLUTION INTRAMUSCULAR; INTRAVENOUS
Status: DISCONTINUED | OUTPATIENT
Start: 2024-07-16 | End: 2024-07-16 | Stop reason: HOSPADM

## 2024-07-16 RX ORDER — OXYCODONE HYDROCHLORIDE 5 MG/1
10 TABLET ORAL
Status: DISCONTINUED | OUTPATIENT
Start: 2024-07-16 | End: 2024-07-17 | Stop reason: HOSPADM

## 2024-07-16 RX ORDER — FENTANYL CITRATE 50 UG/ML
25 INJECTION, SOLUTION INTRAMUSCULAR; INTRAVENOUS
Status: DISCONTINUED | OUTPATIENT
Start: 2024-07-16 | End: 2024-07-17 | Stop reason: HOSPADM

## 2024-07-16 RX ORDER — NALOXONE HYDROCHLORIDE 0.4 MG/ML
0.1 INJECTION, SOLUTION INTRAMUSCULAR; INTRAVENOUS; SUBCUTANEOUS
Status: DISCONTINUED | OUTPATIENT
Start: 2024-07-16 | End: 2024-07-16 | Stop reason: HOSPADM

## 2024-07-16 RX ORDER — SODIUM CHLORIDE, SODIUM LACTATE, POTASSIUM CHLORIDE, CALCIUM CHLORIDE 600; 310; 30; 20 MG/100ML; MG/100ML; MG/100ML; MG/100ML
INJECTION, SOLUTION INTRAVENOUS CONTINUOUS
Status: DISCONTINUED | OUTPATIENT
Start: 2024-07-16 | End: 2024-07-16 | Stop reason: HOSPADM

## 2024-07-16 RX ORDER — LIDOCAINE 40 MG/G
CREAM TOPICAL
Status: DISCONTINUED | OUTPATIENT
Start: 2024-07-16 | End: 2024-07-16 | Stop reason: HOSPADM

## 2024-07-16 RX ORDER — FENTANYL CITRATE 50 UG/ML
50 INJECTION, SOLUTION INTRAMUSCULAR; INTRAVENOUS EVERY 5 MIN PRN
Status: DISCONTINUED | OUTPATIENT
Start: 2024-07-16 | End: 2024-07-16 | Stop reason: HOSPADM

## 2024-07-16 RX ORDER — CEFAZOLIN SODIUM 2 G/50ML
2 SOLUTION INTRAVENOUS SEE ADMIN INSTRUCTIONS
Status: DISCONTINUED | OUTPATIENT
Start: 2024-07-16 | End: 2024-07-16 | Stop reason: HOSPADM

## 2024-07-16 RX ORDER — OXYCODONE HYDROCHLORIDE 5 MG/1
5 TABLET ORAL
Status: COMPLETED | OUTPATIENT
Start: 2024-07-16 | End: 2024-07-16

## 2024-07-16 RX ORDER — ONDANSETRON 2 MG/ML
4 INJECTION INTRAMUSCULAR; INTRAVENOUS EVERY 30 MIN PRN
Status: DISCONTINUED | OUTPATIENT
Start: 2024-07-16 | End: 2024-07-16 | Stop reason: HOSPADM

## 2024-07-16 RX ORDER — DEXAMETHASONE SODIUM PHOSPHATE 10 MG/ML
4 INJECTION, SOLUTION INTRAMUSCULAR; INTRAVENOUS
Status: DISCONTINUED | OUTPATIENT
Start: 2024-07-16 | End: 2024-07-16 | Stop reason: HOSPADM

## 2024-07-16 RX ORDER — ACETAMINOPHEN 325 MG/1
975 TABLET ORAL ONCE
Status: COMPLETED | OUTPATIENT
Start: 2024-07-16 | End: 2024-07-16

## 2024-07-16 RX ORDER — FENTANYL CITRATE 50 UG/ML
25 INJECTION, SOLUTION INTRAMUSCULAR; INTRAVENOUS EVERY 5 MIN PRN
Status: DISCONTINUED | OUTPATIENT
Start: 2024-07-16 | End: 2024-07-16 | Stop reason: HOSPADM

## 2024-07-16 RX ORDER — PROPOFOL 10 MG/ML
INJECTION, EMULSION INTRAVENOUS CONTINUOUS PRN
Status: DISCONTINUED | OUTPATIENT
Start: 2024-07-16 | End: 2024-07-16

## 2024-07-16 RX ORDER — DEXAMETHASONE SODIUM PHOSPHATE 10 MG/ML
4 INJECTION, SOLUTION INTRAMUSCULAR; INTRAVENOUS
Status: DISCONTINUED | OUTPATIENT
Start: 2024-07-16 | End: 2024-07-17 | Stop reason: HOSPADM

## 2024-07-16 RX ORDER — DIPHENHYDRAMINE HCL 25 MG
25 CAPSULE ORAL EVERY 6 HOURS PRN
Status: DISCONTINUED | OUTPATIENT
Start: 2024-07-16 | End: 2024-07-16 | Stop reason: HOSPADM

## 2024-07-16 RX ORDER — DIPHENHYDRAMINE HYDROCHLORIDE 50 MG/ML
25 INJECTION INTRAMUSCULAR; INTRAVENOUS EVERY 6 HOURS PRN
Status: DISCONTINUED | OUTPATIENT
Start: 2024-07-16 | End: 2024-07-16 | Stop reason: HOSPADM

## 2024-07-16 RX ORDER — SODIUM CHLORIDE, SODIUM LACTATE, POTASSIUM CHLORIDE, CALCIUM CHLORIDE 600; 310; 30; 20 MG/100ML; MG/100ML; MG/100ML; MG/100ML
INJECTION, SOLUTION INTRAVENOUS CONTINUOUS PRN
Status: DISCONTINUED | OUTPATIENT
Start: 2024-07-16 | End: 2024-07-16

## 2024-07-16 RX ORDER — ONDANSETRON 4 MG/1
4 TABLET, ORALLY DISINTEGRATING ORAL EVERY 30 MIN PRN
Status: DISCONTINUED | OUTPATIENT
Start: 2024-07-16 | End: 2024-07-16 | Stop reason: HOSPADM

## 2024-07-16 RX ORDER — ONDANSETRON 2 MG/ML
4 INJECTION INTRAMUSCULAR; INTRAVENOUS EVERY 30 MIN PRN
Status: DISCONTINUED | OUTPATIENT
Start: 2024-07-16 | End: 2024-07-17 | Stop reason: HOSPADM

## 2024-07-16 RX ORDER — HYDROMORPHONE HYDROCHLORIDE 1 MG/ML
0.4 INJECTION, SOLUTION INTRAMUSCULAR; INTRAVENOUS; SUBCUTANEOUS EVERY 5 MIN PRN
Status: DISCONTINUED | OUTPATIENT
Start: 2024-07-16 | End: 2024-07-16 | Stop reason: HOSPADM

## 2024-07-16 RX ORDER — LORAZEPAM 2 MG/ML
.5-1 INJECTION INTRAMUSCULAR
Status: DISCONTINUED | OUTPATIENT
Start: 2024-07-16 | End: 2024-07-16 | Stop reason: HOSPADM

## 2024-07-16 RX ORDER — LABETALOL HYDROCHLORIDE 5 MG/ML
10 INJECTION, SOLUTION INTRAVENOUS
Status: DISCONTINUED | OUTPATIENT
Start: 2024-07-16 | End: 2024-07-16 | Stop reason: HOSPADM

## 2024-07-16 RX ORDER — HYDROMORPHONE HYDROCHLORIDE 1 MG/ML
0.2 INJECTION, SOLUTION INTRAMUSCULAR; INTRAVENOUS; SUBCUTANEOUS EVERY 5 MIN PRN
Status: DISCONTINUED | OUTPATIENT
Start: 2024-07-16 | End: 2024-07-16 | Stop reason: HOSPADM

## 2024-07-16 RX ORDER — ACETAMINOPHEN 325 MG/1
975 TABLET ORAL ONCE
Status: DISCONTINUED | OUTPATIENT
Start: 2024-07-16 | End: 2024-07-16 | Stop reason: HOSPADM

## 2024-07-16 RX ORDER — PROPOFOL 10 MG/ML
INJECTION, EMULSION INTRAVENOUS PRN
Status: DISCONTINUED | OUTPATIENT
Start: 2024-07-16 | End: 2024-07-16

## 2024-07-16 RX ORDER — NALOXONE HYDROCHLORIDE 0.4 MG/ML
0.1 INJECTION, SOLUTION INTRAMUSCULAR; INTRAVENOUS; SUBCUTANEOUS
Status: DISCONTINUED | OUTPATIENT
Start: 2024-07-16 | End: 2024-07-17 | Stop reason: HOSPADM

## 2024-07-16 RX ORDER — MEPERIDINE HYDROCHLORIDE 25 MG/ML
12.5 INJECTION INTRAMUSCULAR; INTRAVENOUS; SUBCUTANEOUS EVERY 5 MIN PRN
Status: DISCONTINUED | OUTPATIENT
Start: 2024-07-16 | End: 2024-07-16 | Stop reason: HOSPADM

## 2024-07-16 RX ORDER — DEXAMETHASONE SODIUM PHOSPHATE 4 MG/ML
INJECTION, SOLUTION INTRA-ARTICULAR; INTRALESIONAL; INTRAMUSCULAR; INTRAVENOUS; SOFT TISSUE PRN
Status: DISCONTINUED | OUTPATIENT
Start: 2024-07-16 | End: 2024-07-16

## 2024-07-16 RX ORDER — CEFAZOLIN SODIUM 2 G/50ML
2 SOLUTION INTRAVENOUS
Status: COMPLETED | OUTPATIENT
Start: 2024-07-16 | End: 2024-07-16

## 2024-07-16 RX ADMIN — LIDOCAINE HYDROCHLORIDE 100 MG: 20 INJECTION, SOLUTION INFILTRATION; PERINEURAL at 10:20

## 2024-07-16 RX ADMIN — PROPOFOL 200 MCG/KG/MIN: 10 INJECTION, EMULSION INTRAVENOUS at 10:20

## 2024-07-16 RX ADMIN — FENTANYL CITRATE 25 MCG: 50 INJECTION, SOLUTION INTRAMUSCULAR; INTRAVENOUS at 12:31

## 2024-07-16 RX ADMIN — PROPOFOL 150 MCG/KG/MIN: 10 INJECTION, EMULSION INTRAVENOUS at 11:23

## 2024-07-16 RX ADMIN — Medication 200 MCG: at 10:57

## 2024-07-16 RX ADMIN — OXYCODONE HYDROCHLORIDE 5 MG: 5 TABLET ORAL at 14:12

## 2024-07-16 RX ADMIN — ACETAMINOPHEN 975 MG: 325 TABLET ORAL at 09:35

## 2024-07-16 RX ADMIN — Medication 50 MG: at 10:20

## 2024-07-16 RX ADMIN — Medication 0.2 MG: at 12:57

## 2024-07-16 RX ADMIN — Medication 0.1 MG: at 13:02

## 2024-07-16 RX ADMIN — FENTANYL CITRATE 25 MCG: 50 INJECTION, SOLUTION INTRAMUSCULAR; INTRAVENOUS at 12:46

## 2024-07-16 RX ADMIN — FENTANYL CITRATE 25 MCG: 50 INJECTION, SOLUTION INTRAMUSCULAR; INTRAVENOUS at 12:41

## 2024-07-16 RX ADMIN — Medication 100 MCG: at 11:33

## 2024-07-16 RX ADMIN — FENTANYL CITRATE 25 MCG: 50 INJECTION, SOLUTION INTRAMUSCULAR; INTRAVENOUS at 12:36

## 2024-07-16 RX ADMIN — ONDANSETRON 4 MG: 2 INJECTION INTRAMUSCULAR; INTRAVENOUS at 11:54

## 2024-07-16 RX ADMIN — Medication 100 MCG: at 10:46

## 2024-07-16 RX ADMIN — FENTANYL CITRATE 50 MCG: 50 INJECTION, SOLUTION INTRAMUSCULAR; INTRAVENOUS at 10:20

## 2024-07-16 RX ADMIN — PROPOFOL 300 MG: 10 INJECTION, EMULSION INTRAVENOUS at 10:20

## 2024-07-16 RX ADMIN — KETOROLAC TROMETHAMINE 30 MG: 30 INJECTION, SOLUTION INTRAMUSCULAR; INTRAVENOUS at 13:45

## 2024-07-16 RX ADMIN — DEXAMETHASONE SODIUM PHOSPHATE 4 MG: 4 INJECTION, SOLUTION INTRA-ARTICULAR; INTRALESIONAL; INTRAMUSCULAR; INTRAVENOUS; SOFT TISSUE at 10:35

## 2024-07-16 RX ADMIN — CEFAZOLIN SODIUM 2 G: 2 SOLUTION INTRAVENOUS at 10:12

## 2024-07-16 RX ADMIN — Medication 0.2 MG: at 12:52

## 2024-07-16 RX ADMIN — Medication 200 MCG: at 11:13

## 2024-07-16 RX ADMIN — FENTANYL CITRATE 50 MCG: 50 INJECTION, SOLUTION INTRAMUSCULAR; INTRAVENOUS at 10:41

## 2024-07-16 RX ADMIN — Medication 0.5 MG: at 11:43

## 2024-07-16 RX ADMIN — Medication 200 MCG: at 10:29

## 2024-07-16 RX ADMIN — SODIUM CHLORIDE, SODIUM LACTATE, POTASSIUM CHLORIDE, CALCIUM CHLORIDE: 600; 310; 30; 20 INJECTION, SOLUTION INTRAVENOUS at 10:12

## 2024-07-16 RX ADMIN — PROPOFOL 150 MCG/KG/MIN: 10 INJECTION, EMULSION INTRAVENOUS at 10:59

## 2024-07-16 NOTE — ANESTHESIA PROCEDURE NOTES
Airway       Patient location during procedure: OR       Procedure Start/Stop Times: 7/16/2024 10:22 AM  Staff -        Anesthesiologist:  Mauro Matamoros MD       CRNA: Ana Gastelum APRN CRNA       Performed By: CRNA  Consent for Airway        Urgency: elective  Indications and Patient Condition       Indications for airway management: oty-procedural       Induction type:intravenous       Mask difficulty assessment: 2 - vent by mask + OA or adjuvant +/- NMBA    Final Airway Details       Final airway type: endotracheal airway       Successful airway: ETT - single and Oral  Endotracheal Airway Details        ETT size (mm): 6.5       Cuffed: yes       Successful intubation technique: direct laryngoscopy       DL Blade Type: MAC 3       Grade View of Cords: 1       Adjucts: stylet       Position: Right       Measured from: lips       Secured at (cm): 22       Bite block used: Oral Airway (at end of case)    Post intubation assessment        Placement verified by: capnometry, equal breath sounds and chest rise        Number of attempts at approach: 1       Secured with: tape       Ease of procedure: easy       Dentition: Intact and Unchanged    Medication(s) Administered   Medication Administration Time: 7/16/2024 10:22 AM

## 2024-07-16 NOTE — ANESTHESIA POSTPROCEDURE EVALUATION
Patient: Doretha Yu    Procedure: Procedure(s):  HERNIORRHAPHY, VENTRAL, ROBOT-ASSISTED       Anesthesia Type:  General    Note:  Disposition: Outpatient   Postop Pain Control: Uneventful            Sign Out: Well controlled pain   PONV: No   Neuro/Psych: Uneventful            Sign Out: Acceptable/Baseline neuro status   Airway/Respiratory: Uneventful            Sign Out: Acceptable/Baseline resp. status   CV/Hemodynamics: Uneventful            Sign Out: Acceptable CV status; No obvious hypovolemia; No obvious fluid overload   Other NRE: NONE   DID A NON-ROUTINE EVENT OCCUR?            Last vitals:  Vitals Value Taken Time   /69 07/16/24 1310   Temp 36.1  C (96.9  F) 07/16/24 1310   Pulse 98 07/16/24 1312   Resp 13 07/16/24 1312   SpO2 93 % 07/16/24 1312   Vitals shown include unfiled device data.    Electronically Signed By: Mauro Matamoros MD  July 16, 2024  1:28 PM

## 2024-07-16 NOTE — OP NOTE
[facility]    Operative Note    Pre-operative diagnosis: Ventral hernia without obstruction or gangrene [K43.9]   Post-operative diagnosis *same   Procedure: Procedure(s):  HERNIORRHAPHY, VENTRAL, ROBOT-ASSISTED  Extensive lysis of adhesions (requiring 15 additional minutes of OR time).     Surgeon: Surgeons and Role:     * Arcadio Carrillo MD - Primary   Anesthesia: General    Estimated blood loss: Less than 10 ml   Drains: None   Specimens: * No specimens in log *   Findings: 4x6 cm right sided hernia, with bowel adhered to sac .   Complications: .   Implants: .  Name of mesh: Symbotex 12cm round         COMORBIDITIES:   Past Medical History:   Diagnosis Date    Abnormal MRI     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     Anxiety     Basal cell carcinoma     Cervical high risk HPV (human papillomavirus) test positive 2019    See problem list    Colitis     Depression     Diabetes mellitus, iatrogenic (H) 2020    Esophageal reflux     Inflammatory arthritis     Insomnia     Intestinal giardiasis 2018    Lumbago     left lower back pain    Lymphedema     Malignant melanoma (H)     Melanoma (H) 10/23/2017    Migraines     Mild persistent asthma     Morbid obesity (H) 2019    Morbid obesity with BMI of 40.0-44.9, adult (H)     STORMY (obstructive sleep apnea)     Prothrombin deficiency (H)     takes 81mg asa daily    Stroke (cerebrum) (H)     During     TIA (transient ischemic attack)     Type 2 diabetes mellitus (H)     Ulcerative pancolitis (H)        INDICATIONS FOR PROCEDURE  Doretha Yu is a 49 year old female who has previously undergone colon surgery, and she developed an abdominal wall hernia associated with pain and bulge.    After understanding the risks and benefits of proceeding with a laparoscopic ventral hernia repair with robot assist, she agreed to an operation.    General risks related to  abdominal surgery were reviewed with the patient.    These include, but are not limited to, death, myocardial infarction, pneumonia, urinary tract infection, deep venous thrombosis with or without pulmonary embolus, abdominal infection from bowel injury or abscess, bowel obstruction, wound infection, and bleeding.    Risks related to hernia repair were also discussed and these specifically include the risk of recurrence, chronic abdominal wall pain, seroma, and wound and mesh infection.    OPERATIVE PROCEDURE:  Under the benefits of general endotracheal anesthesia, patient was flexed and placed head down. The peritoneal cavity was entered using a Veress thru a 12mm high LUQ incision. A 12MM port was placed in the abdomen. Using direct vision, 3 L lateral 8mm ports were placed. A #1 stratafix was placed int eh abdomen and the robot was docked. Instruments inserted and I transitioned to the console.     There were numerous intraabdominal adhesions and these were taken down using scissors. There was bowel near the sac, which was taken down  sharply and very carefully to avoid injury. The falcuiform was also taken down to allow space for mesh. The defect was cleared and noted top be 4x6cm. The edge of sac around the defect was taken with monopolar scissors.    Pneumo was reduced to 10mmHg    Using the #1 stratafix, the defect was closed primarily in a running fashion.     Next,the mesh was placed in the abdomen. It was affixed to cover the incision 4cm in all directions. The mesh was circumferentially sutured to the abdominal wall, using 4 #3-0 Stratafix. The mesh lay taut and flat. ALL needles removed under direct vision     The abdomen was inspected for hemostasis.    I desufflated the abdomen and this revealed appropriate hernia coverage.    Pneumoperitoneum was completely desufflated and all ports were removed.    30 ml local (see MAR for type) was used on the incisions, and 4-0 Monocryl and skin glue was used on  the skin.  Sterile dressings were applied.  The patient tolerated the procedure well.       I was scrubbed for all critical components of the operation.    All sponge and needle counts were correct x 2 at the end of the procedure.      Arcadio Carrillo MD    Surgery  [phone] (hospital )  [phone] (clinic nurses)

## 2024-07-16 NOTE — DISCHARGE INSTRUCTIONS
[facility]  Home Care Following Anesthesia  For 24 hours after surgery:  Get plenty of rest.  A responsible adult must stay with you for at least 24 hours after you leave the surgery center.  Do not drive or use heavy equipment.  If you have weakness or tingling, don't drive or use heavy equipment until this feeling goes away.   Do not drink alcohol.   Avoid strenuous or risky activities.  Ask for help when climbing stairs.  You may feel lightheaded.  IF so, sit for a few minutes before standing.  Have someone help you get up.   If you have nausea (feel sick to your stomach): Drink only clear liquids such as apple juice, ginger ale, broth or 7-Up.  Rest may also help.  Be sure to drink enough fluids.  Move to a regular diet as you feel able.   You may have a slight fever.  Call the doctor if your fever is over 100 F (37.7 C) (taken under the tongue) or lasts longer than 24 hours.  You may have a dry mouth, a sore throat, muscle aches or trouble sleeping. These should go away after 24 hours.  Do not make important or legal decisions.   It is recommended to avoid smoking.               Tips for taking pain medications  To get the best pain relief possible, remember these points:  Take pain medications as directed, before pain becomes severe.  Pain medication can upset your stomach: taking it with food may help.  Constipation is a common side effect of pain medication. Drink plenty of  fluids.  Eat foods high in fiber. Take a stool softener if recommended by your doctor or pharmacist.  Do not drink alcohol, drive or operate machinery while taking pain medications.  Ask about other ways to control pain, such as with heat, ice or relaxation.    Tylenol/Acetaminophen Consumption    If you feel your pain relief is insufficient, you may take Tylenol/Acetaminophen in addition to your narcotic pain medication.   Be careful not to exceed 4,000 mg of Tylenol/Acetaminophen in a 24 hour  period from all sources.  If you are taking extra strength Tylenol/acetaminophen (500 mg), the maximum dose is 8 tablets in 24 hours.  If you are taking regular strength acetaminophen (325 mg), the maximum dose is 12 tablets in 24 hours.    Call a doctor for any of the following:  Signs of infection (fever, growing tenderness at the surgery site, a large amount of drainage or bleeding, severe pain, foul-smelling drainage, redness, swelling).  It has been over 8 to 10 hours since surgery and you are still not able to urinate (pass water).  Headache for over 24 hours.  Numbness, tingling or weakness the day after surgery (if you had spinal anesthesia).  Signs of Covid-19 infection (temperature over 100 degrees, shortness of breath, cough, loss of taste/smell, generalized body aches, persistent headache, chills, sore throat, nausea/vomiting/diarrhea)  Your doctor is:       Dr. Arcadio Crarillo, General Surgery: [phone]               Or dial [phone] and ask for the resident on call for:  General Surgery  For emergency care, call the:  [facility]:  [phone] (TTY for hearing impaired: [phone])

## 2024-07-16 NOTE — ANESTHESIA POSTPROCEDURE EVALUATION
Patient: Doretha Yu    Procedure: Procedure(s):  HERNIORRHAPHY, VENTRAL, ROBOT-ASSISTED       Anesthesia Type:  General    Note:  Disposition: Outpatient   Postop Pain Control: Uneventful            Sign Out: Well controlled pain   PONV: No   Neuro/Psych: Uneventful            Sign Out: Acceptable/Baseline neuro status   Airway/Respiratory: Uneventful            Sign Out: Acceptable/Baseline resp. status   CV/Hemodynamics: Uneventful            Sign Out: Acceptable CV status; No obvious hypovolemia; No obvious fluid overload   Other NRE: NONE   DID A NON-ROUTINE EVENT OCCUR?            Last vitals:  Vitals Value Taken Time   /66 07/16/24 1234   Temp 36.1  C (96.9  F) 07/16/24 1225   Pulse 80 07/16/24 1239   Resp 7 07/16/24 1239   SpO2 100 % 07/16/24 1238   Vitals shown include unfiled device data.    Electronically Signed By: Mauro Matamoros MD  July 16, 2024  12:40 PM

## 2024-07-16 NOTE — ANESTHESIA CARE TRANSFER NOTE
Patient: Doretha Yu    Procedure: Procedure(s):  HERNIORRHAPHY, VENTRAL, ROBOT-ASSISTED       Diagnosis: Ventral hernia without obstruction or gangrene [K43.9]  Diagnosis Additional Information: No value filed.    Anesthesia Type:   General     Note:    Oropharynx: spontaneously breathing  Level of Consciousness: drowsy  Oxygen Supplementation: face mask  Level of Supplemental Oxygen (L/min / FiO2): 6  Independent Airway: airway patency satisfactory and stable  Dentition: dentition unchanged  Vital Signs Stable: post-procedure vital signs reviewed and stable  Report to RN Given: handoff report given  Patient transferred to: PACU    Handoff Report: Identifed the Patient, Identified the Reponsible Provider, Reviewed the pertinent medical history, Discussed the surgical course, Reviewed Intra-OP anesthesia mangement and issues during anesthesia, Set expectations for post-procedure period and Allowed opportunity for questions and acknowledgement of understanding      Vitals:  Vitals Value Taken Time   /71 07/16/24 1219   Temp 36.1  C (96.9  F) 07/16/24 1219   Pulse 80 07/16/24 1222   Resp 22 07/16/24 1222   SpO2 100 % 07/16/24 1222   Vitals shown include unfiled device data.    Electronically Signed By: AMADA Skinner CRNA  July 16, 2024  12:23 PM

## 2024-07-16 NOTE — ANESTHESIA PREPROCEDURE EVALUATION
Anesthesia Pre-Procedure Evaluation    Patient: Doretha Yu   MRN: [id number] : 1975        Procedure : Procedure(s):  HERNIORRHAPHY, VENTRAL, ROBOT-ASSISTED          Past Medical History:   Diagnosis Date    Abnormal MRI     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     Anxiety     Basal cell carcinoma     Cervical high risk HPV (human papillomavirus) test positive 2019    See problem list    Colitis     Depression     Diabetes mellitus, iatrogenic (H) 2020    Esophageal reflux     Inflammatory arthritis     Insomnia     Intestinal giardiasis 2018    Lumbago     left lower back pain    Lymphedema     Malignant melanoma (H)     Melanoma (H) 10/23/2017    Migraines     Mild persistent asthma     Morbid obesity (H) 2019    Morbid obesity with BMI of 40.0-44.9, adult (H)     STORMY (obstructive sleep apnea)     Prothrombin deficiency (H)     takes 81mg asa daily    Stroke (cerebrum) (H)     During     TIA (transient ischemic attack)     Type 2 diabetes mellitus (H)     Ulcerative pancolitis (H)       Past Surgical History:   Procedure Laterality Date    APPENDECTOMY      COLONOSCOPY N/A 10/18/2017    Procedure: COLONOSCOPY;  Colon;  Surgeon: Debbie Stephens MD;  [facility]: [facility]    COLONOSCOPY N/A 2018    Procedure: COMBINED COLONOSCOPY, SINGLE OR MULTIPLE BIOPSY/POLYPECTOMY BY BIOPSY;  colon;  Surgeon: Benita Schumacher MD;  Location: [facility]    COLONOSCOPY      multiple since  to present - about 6 total    DAVINCI ASSISTED TRANSANAL TOTAL MESORECTAL EXCISION N/A 2023    Procedure: COMPLETION PROCTECTOMY, ROBOT-ASSISTED, ILEAL POUCH ANASTAMOSIS;  Surgeon: Quinton Ram MD;  Location: [facility]    DISSECT LYMPH NODE AXILLA Left 10/23/2017    Procedure: DISSECT LYMPH NODE AXILLA;  Left Axillary Lymph Node Dissection ;  Surgeon: Laurent Cool MD;  Location: [facility]    EXAM UNDER ANESTHESIA PELVIC N/A 2020    Procedure: EXAM UNDER  ANESTHESIA, PELVIS; with Cervical Biopsies, Vaginal Biopsy and Endocervical Curettings;  Surgeon: Melina Jung MD;  Location: [facility] OR    GYN SURGERY  ,         LAPAROSCOPIC ASSISTED COLECTOMY N/A 06/15/2021    Procedure: laparoscopic total abdominal colectomy, end ileostomy;  Surgeon: Quinton Ram MD;  Location: [facility]    REPAIR MOHS Left 2017    Procedure: REPAIR MOHS;  Left Upper Lid Moh's Reconstruction;  Surgeon: Kisha Bosch MD;  Location: [facility]    SIGMOIDOSCOPY FLEXIBLE N/A 2023    Procedure: Sigmoidoscopy flexible;  Surgeon: Quinton Ram MD;  Location: [facility]    TAKEDOWN ILEOSTOMY N/A 2023    Procedure: CLOSURE, ILEOSTOMY;  Surgeon: Quinton Ram MD;  Location: [facility]      Allergies   Allergen Reactions    Bee Venom Swelling    Azithromycin Diarrhea    Erythromycin      Other reaction(s): GI intolerance, Vomiting    Fentanyl Other (See Comments)     sweating  sweating    Prochlorperazine Fatigue     Other reaction(s): Other (see comments)  Fatigue    Buspirone      Other reaction(s): GI intolerance  vomiting    Erythrocin Nausea and Vomiting    Gluten Meal      Celiac disease    Topamax [Topiramate]      Made her lethargic    Zithromax [Azithromycin Dihydrate] Diarrhea    Enbrel [Etanercept] Hives and Rash      Social History     Tobacco Use    Smoking status: Former     Current packs/day: 0.00     Average packs/day: 1 pack/day for 5.0 years (5.0 ttl pk-yrs)     Types: Cigarettes     Start date: 3/20/1993     Quit date: 3/20/1998     Years since quittin.3     Passive exposure: Past    Smokeless tobacco: Never   Substance Use Topics    Alcohol use: Not Currently      Wt Readings from Last 1 Encounters:   24 87.5 kg (193 lb)        Anesthesia Evaluation            ROS/MED HX  ENT/Pulmonary:     (+) sleep apnea,                     asthma                  Neurologic:     (+)          CVA,    TIA,                   Cardiovascular:       METS/Exercise Tolerance:     Hematologic:       Musculoskeletal:       GI/Hepatic:       Renal/Genitourinary:       Endo:     (+)  type II DM,             Obesity,       Psychiatric/Substance Use:       Infectious Disease:       Malignancy:       Other:      (+)  , H/O Chronic Pain,         Physical Exam    Airway        Mallampati: III   TM distance: > 3 FB   Neck ROM: full     Respiratory Devices and Support         Dental       (+) Minor Abnormalities - some fillings, tiny chips      Cardiovascular          Rhythm and rate: regular     Pulmonary           breath sounds clear to auscultation           OUTSIDE LABS:  CBC:   Lab Results   Component Value Date    WBC 8.2 07/15/2024    WBC 9.8 06/10/2024    HGB 11.6 (L) 07/15/2024    HGB 11.9 06/10/2024    HCT 35.1 07/15/2024    HCT 36.6 06/10/2024     07/15/2024     06/10/2024     BMP:   Lab Results   Component Value Date     07/15/2024     01/29/2024    POTASSIUM 4.1 07/15/2024    POTASSIUM 3.9 01/29/2024    CHLORIDE 104 07/15/2024    CHLORIDE 102 01/29/2024    CO2 20 (L) 07/15/2024    CO2 19 (L) 01/29/2024    BUN 12.7 07/15/2024    BUN 9.5 01/29/2024    CR 0.80 07/15/2024    CR 0.69 01/29/2024    GLC 92 07/16/2024     (H) 07/15/2024     COAGS:   Lab Results   Component Value Date    PTT 29 10/30/2023    INR 1.07 10/30/2023     POC:   Lab Results   Component Value Date     (H) 06/19/2021    HCG Negative 07/16/2024    HCGS Negative 08/14/2022     HEPATIC:   Lab Results   Component Value Date    ALBUMIN 4.4 06/10/2024    PROTTOTAL 7.2 06/10/2024    ALT 24 06/10/2024    AST 20 06/10/2024    ALKPHOS 80 06/10/2024    BILITOTAL <0.2 06/10/2024     OTHER:   Lab Results   Component Value Date    LACT 0.8 10/30/2023    A1C 5.6 06/10/2024    PATRICIA 9.4 07/15/2024    PHOS 4.6 (H) 12/29/2023    MAG 1.8 12/29/2023    LIPASE 82 (H) 03/29/2023    AMYLASE 19 (L) 10/30/2023    TSH 2.55 10/30/2023    T4 0.86 04/14/2022     "CRP 18.3 (H) 04/14/2022    SED 35 (H) 12/18/2023       Anesthesia Plan    ASA Status:  3       Anesthesia Type: General.     - Airway: ETT   Induction: Intravenous, Propofol.   Maintenance: TIVA.        Consents    Anesthesia Plan(s) and associated risks, benefits, and realistic alternatives discussed. Questions answered and patient/representative(s) expressed understanding.     - Discussed:     - Discussed with:  Patient            Postoperative Care            Comments:               Mauro Matamoros MD    I have reviewed the pertinent notes and labs in the chart from the past 30 days and (re)examined the patient.  Any updates or changes from those notes are reflected in this note.              # Obesity: Estimated body mass index is 34.19 kg/m  as calculated from the following:    Height as of this encounter: 1.6 m (5' 3\").    Weight as of this encounter: 87.5 kg (193 lb).      "

## 2024-07-16 NOTE — PROGRESS NOTES
The risks of hernia repair were reviewed with the patient.    These risks combine the risks of abdominal surgery and the risks of hernia repair, including mesh implantation, and were described to the patient as follows:    Abdominal surgery risks:    These include, but are not limited to, death, myocardial infarction, pneumonia, urinary tract infection, deep venous thrombosis with or without pulmonary embolus, abdominal infection from bowel injury or abscess, bowel obstruction, wound infection, and bleeding.    Hernia repair risks:    Food and Drug Administration Comments on Hernia Repair Surgery and Mesh Implantation.    http://www.fda.gov/MedicalDevices/ProductsandMedicalProcedures/ImplantsandProsthetics/HerniaSurgicalMesh/default.htm      Hernia Repair Complications    Based on FDA s analysis of medical device adverse event reports and of peer-reviewed, scientific literature, the most common adverse events for all surgical repair of hernias--with or without mesh--are pain, infection, hernia recurrence, scar-like tissue that sticks tissues together (adhesion), blockage of the large or small intestine (obstruction), bleeding, abnormal connection between organs, vessels, or intestines (fistula), fluid build-up at the surgical site (seroma), and a hole in neighboring tissues or organs (perforation).    The most common adverse events following hernia repair with mesh are pain, infection, hernia recurrence, adhesion, and bowel obstruction. Some other potential adverse events that can occur following hernia repair with mesh are mesh migration and mesh shrinkage (contraction).    Many complications related to hernia repair with surgical mesh that have been reported to the FDA have been associated with recalled mesh products that are no longer on the market. Pain, infection, recurrence, adhesion, obstruction, and perforation are the most common complications associated with recalled mesh. In the FDA s analysis of medical  adverse event reports to the FDA, recalled mesh products were the main cause of bowel perforation and obstruction complications.    Please refer to the recall notices here and here for more information if you have recalled mesh. For more information on the recalled products, please visit the FDA Medical Device Recall website. Please visit the Medical & Radiation Emitting Device Database to search a specific type of surgical mesh.    If you are unsure about the specific mesh  and brand used in your surgery and have questions about your hernia repair, contact your surgeon or the facility where your surgery was performed to obtain the information from your medical record.

## 2024-07-16 NOTE — RESULT ENCOUNTER NOTE
Doretha,    These labs are normal or acceptable.    Please contact my office if you have questions.    Anna Naidu M.D.

## 2024-07-18 ENCOUNTER — PATIENT OUTREACH (OUTPATIENT)
Dept: ENDOCRINOLOGY | Facility: CLINIC | Age: 49
End: 2024-07-18
Payer: COMMERCIAL

## 2024-07-18 NOTE — PROGRESS NOTES
RN Post-Op/Post-Discharge Care Coordination Note    Ms. Doretha Yu is a 49 year old female who underwent Ventral/Incisional hernia repair, laparoscopic robot assisted on 07/16/24 with  Arcadio Carrillo MD.      Left Message.

## 2024-07-19 ENCOUNTER — MYC MEDICAL ADVICE (OUTPATIENT)
Dept: SURGERY | Facility: CLINIC | Age: 49
End: 2024-07-19
Payer: COMMERCIAL

## 2024-07-19 NOTE — TELEPHONE ENCOUNTER
RN Post-Op/Post-Discharge Care Coordination Note    Ms. Doretha Yu is a 49 year old female who underwent Ventral/Incisional hernia repair, laparoscopic on 07/16/24 with  Arcadio Carrillo MD.  Spoke with Patient.    Support  Patient able to care for self independently     Health Status  Fevers/chills: Patient denies any fever or chills.  Nausea/Vomiting: Patient denies nausea/vomiting.  Eating/drinking: Patient is able to eat and drink without any complaints.  Bowel habits: Patient reports having loose stool.   Drains (SHAY): N/A  Incisions: Patient denies any signs and symptoms of infection..  Wound closure:  Skin Sealant  Pain: 6 on a scale of 0-10.  Using Tylenol for pain control.  Patient having a lot of gas pain.  States she was told not to take any Immodium and is now having constant diarrhea.  Instructed patient to try Metamucil, choose warm fluids, and stay hydrated.  Instructed to stick to a bland diet, get up and walk, and try a heating pad on abdomen.  Can also add in Gas X.  New Medications:  Oxycodone    Activity/Restrictions  No lifting in excess of 15-20 pounds for 3-4 weeks    Equipment  None    Pathology reviewed with patient:  N/A    Forms/Letters  No    All of her questions were answered including reviewing restrictions, and wound care.  She will call this office if she has any further questions and/or concerns.      Whom and When to Call  Patient acknowledges understanding of how to manage any medication changes and   when to seek medical care.     Patient advised that if after hour medical concerns arise to please call [phone] and choose option 4 to speak to the physician on call.

## 2024-07-30 ENCOUNTER — TELEPHONE (OUTPATIENT)
Dept: GASTROENTEROLOGY | Facility: CLINIC | Age: 49
End: 2024-07-30
Payer: COMMERCIAL

## 2024-07-30 ENCOUNTER — TELEPHONE (OUTPATIENT)
Dept: FAMILY MEDICINE | Facility: CLINIC | Age: 49
End: 2024-07-30
Payer: COMMERCIAL

## 2024-07-30 NOTE — TELEPHONE ENCOUNTER
Message to Dr. Arcadio Carrillo whom performed a Ventral/Incisional hernia repair, laparoscopic on 07/16/24 on patient to verify patient is okay to proceed with scheduled EGD/Pouchoscopy.  --------------------------------------------------------------------------------------------------------------------        Pre visit planning completed.      Procedure details:    Patient scheduled for  Upper Endoscopy/Pouchoscopy  on 8.7.2024.     Arrival time: 1200. Procedure time 1300    Facility location: [facility]; [address] [place] [address] [place] [address]. Check in location: 1st Floor [facility], [place], operated by [facility].     Sedation type: MAC    Pre op exam completed? Yes. Pre op exam completed on 7.15.2024. At [facility]  with Anna Naidu      Indication for procedure:   Ulcerative colitis without complications, unspecified location (H) [K51.90]  - Primary      Regurgitation of food [R11.10]            Chart review:     Electronic implanted devices? No    Recent diagnosis of diverticulitis within the last 6 weeks? No      Medication review:    Diabetic?  Last Hgb A1C was normal.    Anticoagulants? No    Weight loss medication/injectable? Yes - Ozempic (Semaglutide). Weekly dosing of medication.  Hold 7 days before procedure.  Follow up with managing provider.     NSAIDS? No NSAID medications per patient's medication list.  RN will verify with pre-assessment call.    Other medication HOLDING recommendations:  Cannabis/Marijuana: Stop night before procedure.      Prep for procedure:     Bowel prep recommendation: Enemas  Due to: standard bowel prep.    Prep instructions sent via [employer]         Estefani Lo RN  Endoscopy Procedure Pre Assessment RN  [phone] option 4

## 2024-07-30 NOTE — TELEPHONE ENCOUNTER
Prior Authorization Approval    Authorization Effective Date: 5/1/2024  Authorization Expiration Date: 7/30/2025  Medication: Venlafaxine 75mg Tablets-APPROVED  Reference #:     Insurance Company: [employer]AP - Phone [phone] Fax [phone]  Which Pharmacy is filling the prescription (Not needed for infusion/clinic administered): [facility] [place], [place] - [address]  Pharmacy Notified: Yes  Patient Notified: Instructed pharmacy to notify patient when script is ready to /ship.

## 2024-07-30 NOTE — TELEPHONE ENCOUNTER
Prior Authorization Retail Medication Request    Medication/Dose: Venlafaxine 75mg Tablets  Diagnosis and ICD code (if different than what is on RX):  na  New/renewal/insurance change PA/secondary ins. PA:  Previously Tried and Failed:  na  Rationale:  na    Insurance   [place]  Insurance ID:  [id number]    Secondary (if applicable):cindy  Insurance ID:  cindy    Pharmacy Information (if different than what is on RX)  Name:  [facility]  Phone:  [phone]  Fax:[phone]

## 2024-07-30 NOTE — TELEPHONE ENCOUNTER
[facility] Prior Authorization Team   Phone: [phone]    PA Initiation    Medication: VENLAFAXINE HCL 75 MG PO TABS  Insurance Company: [employer]AP - Phone [phone] Fax [phone]  Pharmacy Filling the Rx: [place], [place] - [address]  Filling Pharmacy Phone: [phone]  Filling Pharmacy Fax:    Start Date: 7/30/2024

## 2024-07-31 NOTE — TELEPHONE ENCOUNTER
Attempted to contact patient in order to complete pre assessment questions.     No answer. Left message to return call to [phone] option 4    Callback required communication sent via [employer].      Kailee Gilbert RN  Endoscopy Procedure Pre Assessment

## 2024-08-01 NOTE — TELEPHONE ENCOUNTER
Pre assessment completed for upcoming procedure.   (Please see previous telephone encounter notes for complete details)      Procedure details:    Arrival time and facility location reviewed.    Pre op exam needed? Yes. Pre op exam completed on 7/15/24.    Designated  policy reviewed. Instructed to have someone stay 24  hours post procedure.       Medication review:    Medications reviewed. Please see supporting documentation below. Holding recommendations discussed (if applicable).   Weight loss medication/injectable: Ozempic (Semaglutide). Weekly dosing of medication.  Hold 7 days before procedure.  Follow up with managing provider. Patient reports they haven't taken an Ozempic dose for about 1 month now but planning to resume post-procedure.      Prep for procedure:     Procedure prep instructions reviewed.        Any additional information needed:  N/A      Patient  verbalized understanding and had no questions or concerns at this time.      Kailee Gilbert RN  Endoscopy Procedure Pre Assessment   [phone] option 4

## 2024-08-07 ENCOUNTER — ANESTHESIA EVENT (OUTPATIENT)
Dept: GASTROENTEROLOGY | Facility: CLINIC | Age: 49
End: 2024-08-07
Payer: COMMERCIAL

## 2024-08-07 ENCOUNTER — HOSPITAL ENCOUNTER (OUTPATIENT)
Facility: CLINIC | Age: 49
Discharge: HOME | End: 2024-08-07
Attending: INTERNAL MEDICINE | Admitting: INTERNAL MEDICINE
Payer: COMMERCIAL

## 2024-08-07 ENCOUNTER — ANESTHESIA (OUTPATIENT)
Dept: GASTROENTEROLOGY | Facility: CLINIC | Age: 49
End: 2024-08-07
Payer: COMMERCIAL

## 2024-08-07 VITALS
HEART RATE: 74 BPM | SYSTOLIC BLOOD PRESSURE: 104 MMHG | DIASTOLIC BLOOD PRESSURE: 75 MMHG | RESPIRATION RATE: 9 BRPM | WEIGHT: 193 LBS | BODY MASS INDEX: 34.2 KG/M2 | HEIGHT: 63 IN | OXYGEN SATURATION: 100 %

## 2024-08-07 LAB
PROVATION GI EXAM: NORMAL
UPPER GI ENDOSCOPY: NORMAL

## 2024-08-07 PROCEDURE — 88305 TISSUE EXAM BY PATHOLOGIST: CPT | Mod: 26 | Performed by: PATHOLOGY

## 2024-08-07 PROCEDURE — 250N000011 HC RX IP 250 OP 636: Performed by: ANESTHESIOLOGY

## 2024-08-07 PROCEDURE — 88305 TISSUE EXAM BY PATHOLOGIST: CPT | Mod: TC | Performed by: INTERNAL MEDICINE

## 2024-08-07 PROCEDURE — 258N000003 HC RX IP 258 OP 636: Performed by: ANESTHESIOLOGY

## 2024-08-07 PROCEDURE — 43239 EGD BIOPSY SINGLE/MULTIPLE: CPT | Performed by: INTERNAL MEDICINE

## 2024-08-07 PROCEDURE — 370N000017 HC ANESTHESIA TECHNICAL FEE, PER MIN: Performed by: INTERNAL MEDICINE

## 2024-08-07 PROCEDURE — 43239 EGD BIOPSY SINGLE/MULTIPLE: CPT

## 2024-08-07 PROCEDURE — 250N000009 HC RX 250: Performed by: ANESTHESIOLOGY

## 2024-08-07 PROCEDURE — 43239 EGD BIOPSY SINGLE/MULTIPLE: CPT | Performed by: ANESTHESIOLOGY

## 2024-08-07 PROCEDURE — 999N000010 HC STATISTIC ANES STAT CODE-CRNA PER MINUTE: Performed by: INTERNAL MEDICINE

## 2024-08-07 PROCEDURE — 999N000128 HC STATISTIC PERIPHERAL IV START W/O US GUIDANCE

## 2024-08-07 PROCEDURE — 44386 ENDOSCOPY BOWEL POUCH/BIOP: CPT | Performed by: INTERNAL MEDICINE

## 2024-08-07 RX ORDER — LIDOCAINE HYDROCHLORIDE 20 MG/ML
INJECTION, SOLUTION INFILTRATION; PERINEURAL PRN
Status: DISCONTINUED | OUTPATIENT
Start: 2024-08-07 | End: 2024-08-07

## 2024-08-07 RX ORDER — PROPOFOL 10 MG/ML
INJECTION, EMULSION INTRAVENOUS CONTINUOUS PRN
Status: DISCONTINUED | OUTPATIENT
Start: 2024-08-07 | End: 2024-08-07

## 2024-08-07 RX ORDER — SODIUM CHLORIDE, SODIUM LACTATE, POTASSIUM CHLORIDE, CALCIUM CHLORIDE 600; 310; 30; 20 MG/100ML; MG/100ML; MG/100ML; MG/100ML
INJECTION, SOLUTION INTRAVENOUS CONTINUOUS PRN
Status: DISCONTINUED | OUTPATIENT
Start: 2024-08-07 | End: 2024-08-07

## 2024-08-07 RX ORDER — ONDANSETRON 2 MG/ML
INJECTION INTRAMUSCULAR; INTRAVENOUS PRN
Status: DISCONTINUED | OUTPATIENT
Start: 2024-08-07 | End: 2024-08-07

## 2024-08-07 RX ADMIN — PHENYLEPHRINE HYDROCHLORIDE 50 MCG: 10 INJECTION INTRAVENOUS at 14:23

## 2024-08-07 RX ADMIN — PHENYLEPHRINE HYDROCHLORIDE 200 MCG: 10 INJECTION INTRAVENOUS at 14:14

## 2024-08-07 RX ADMIN — PHENYLEPHRINE HYDROCHLORIDE 50 MCG: 10 INJECTION INTRAVENOUS at 14:31

## 2024-08-07 RX ADMIN — PHENYLEPHRINE HYDROCHLORIDE 200 MCG: 10 INJECTION INTRAVENOUS at 14:06

## 2024-08-07 RX ADMIN — LIDOCAINE HYDROCHLORIDE 100 MG: 20 INJECTION, SOLUTION INFILTRATION; PERINEURAL at 14:02

## 2024-08-07 RX ADMIN — SODIUM CHLORIDE, POTASSIUM CHLORIDE, SODIUM LACTATE AND CALCIUM CHLORIDE: 600; 310; 30; 20 INJECTION, SOLUTION INTRAVENOUS at 14:02

## 2024-08-07 RX ADMIN — PROPOFOL 200 MCG/KG/MIN: 10 INJECTION, EMULSION INTRAVENOUS at 14:02

## 2024-08-07 RX ADMIN — ONDANSETRON 4 MG: 2 INJECTION INTRAMUSCULAR; INTRAVENOUS at 14:02

## 2024-08-07 ASSESSMENT — ACTIVITIES OF DAILY LIVING (ADL)
ADLS_ACUITY_SCORE: 40

## 2024-08-07 NOTE — ANESTHESIA POSTPROCEDURE EVALUATION
Patient: Doretha Yu    Procedure: Procedure(s):  ESOPHAGOGASTRODUODENOSCOPY, WITH BIOPSY  Pouchoscopy with biopsy       Anesthesia Type:  MAC    Note:     Postop Pain Control: Uneventful            Sign Out: Well controlled pain   PONV: No   Neuro/Psych: Uneventful            Sign Out: Acceptable/Baseline neuro status   Airway/Respiratory: Uneventful            Sign Out: Acceptable/Baseline resp. status   CV/Hemodynamics: Uneventful            Sign Out: Acceptable CV status; No obvious hypovolemia; No obvious fluid overload   Other NRE:    DID A NON-ROUTINE EVENT OCCUR? No           Last vitals:  Vitals Value Taken Time   /75 08/07/24 1500   Temp     Pulse 71 08/07/24 1507   Resp 17 08/07/24 1508   SpO2 100 % 08/07/24 1453   Vitals shown include unfiled device data.    Electronically Signed By: Gina Catalan MD, MD  August 7, 2024  6:03 PM

## 2024-08-07 NOTE — H&P
Doretha Yu  [id number]  female  49 year old      Reason for procedure/surgery: regurgitation and irregular bowel habits    Patient Active Problem List   Diagnosis    Hyperlipidemia LDL goal <100    DUB (dysfunctional uterine bleeding)    Anxiety state    Esophageal reflux    Moderate major depression (H)    Mild intermittent asthma without complication    Vision changes    Prothrombin mutation (H24)    Metastatic malignant melanoma (H)    Malignant melanoma of left upper extremity including shoulder (H)    Functional diarrhea    Colitis    Bilateral leg cramps    Rash and nonspecific skin eruption    Other chronic pain    Immunosuppressed status (H24)    Ulcerative colitis with complication, unspecified location (H)    Cervical high risk HPV (human papillomavirus) test positive    STORMY (obstructive sleep apnea)    Secondary lymphedema    Chronic neutrophilia    High risk HPV infection    Abdominal pain    Anemia    Hypocalcemia    Arthritis, rheumatoid (H)    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)    Immunosuppression (H24)    Pain syndrome, chronic    Drug-induced Cushing's syndrome (H24)    Ulcerative colitis without complications, unspecified location (H)    S/P colectomy    Polyarthralgia    Drug-induced polyneuropathy (H24)    Arthralgia of both knees    Adjustment disorder with mixed emotional features    Migraines    Gastroesophageal reflux disease without esophagitis    High output ileostomy (H)    Type 2 diabetes mellitus without complication, without long-term current use of insulin (H)    Ventral hernia without obstruction or gangrene       Past Surgical History:    Past Surgical History:   Procedure Laterality Date    APPENDECTOMY  2004    COLONOSCOPY N/A 10/18/2017    Procedure: COLONOSCOPY;  Colon;  Surgeon: Debbie Stephens MD;  [facility]    COLONOSCOPY N/A 03/09/2018    Procedure: COMBINED COLONOSCOPY, SINGLE OR MULTIPLE BIOPSY/POLYPECTOMY BY  BIOPSY;  colon;  Surgeon: Benita Schumacher MD;  [facility]    COLONOSCOPY      multiple since 2018 to present - about 6 total    DAVINCI ASSISTED TRANSANAL TOTAL MESORECTAL EXCISION N/A 2023    Procedure: COMPLETION PROCTECTOMY, ROBOT-ASSISTED, ILEAL POUCH ANASTAMOSIS;  Surgeon: Quinton Ram MD;  [facility]    DAVINCI HERNIORRHAPHY VENTRAL N/A 2024    Procedure: HERNIORRHAPHY, VENTRAL, ROBOT-ASSISTED;  Surgeon: Arcadio Carrillo MD;  [facility]    DISSECT LYMPH NODE AXILLA Left 10/23/2017    Procedure: DISSECT LYMPH NODE AXILLA;  Left Axillary Lymph Node Dissection ;  Surgeon: Laurent Cool MD;  [facility]    EXAM UNDER ANESTHESIA PELVIC N/A 2020    Procedure: EXAM UNDER ANESTHESIA, PELVIS; with Cervical Biopsies, Vaginal Biopsy and Endocervical Curettings;  Surgeon: Melina Jung MD;  [facility]    GYN SURGERY  ,         LAPAROSCOPIC ASSISTED COLECTOMY N/A 06/15/2021    Procedure: laparoscopic total abdominal colectomy, end ileostomy;  Surgeon: Quinton Ram MD;  [facility]    REPAIR MOHS Left 2017    Procedure: REPAIR MOHS;  Left Upper Lid Moh's Reconstruction;  Surgeon: Kisha Bosch MD;  [facility]    SIGMOIDOSCOPY FLEXIBLE N/A 2023    Procedure: Sigmoidoscopy flexible;  Surgeon: Quinton Ram MD;  [facility]    TAKEDOWN ILEOSTOMY N/A 2023    Procedure: CLOSURE, ILEOSTOMY;  Surgeon: Quinton Ram MD;  [facility]       Past Medical History:   Past Medical History:   Diagnosis Date    Abnormal MRI     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     Anxiety     Basal cell carcinoma     Cervical high risk HPV (human papillomavirus) test positive 2019    See problem list    Colitis     Depression     Diabetes mellitus, iatrogenic (H) 2020    Esophageal reflux     Inflammatory arthritis     Insomnia     Intestinal giardiasis 2018     Lumbago     left lower back pain    Lymphedema     Malignant melanoma (H)     Melanoma (H) 10/23/2017    Migraines     Mild persistent asthma     Morbid obesity (H) 2019    Morbid obesity with BMI of 40.0-44.9, adult (H)     STORMY (obstructive sleep apnea)     Prothrombin deficiency (H)     takes 81mg asa daily    Stroke (cerebrum) (H)     During     TIA (transient ischemic attack)     Type 2 diabetes mellitus (H)     Ulcerative pancolitis (H)        Social History:   Social History     Tobacco Use    Smoking status: Former     Current packs/day: 0.00     Average packs/day: 1 pack/day for 5.0 years (5.0 ttl pk-yrs)     Types: Cigarettes     Start date: 3/20/1993     Quit date: 3/20/1998     Years since quittin.4     Passive exposure: Past    Smokeless tobacco: Never   Substance Use Topics    Alcohol use: Not Currently       Family History:   Family History   Problem Relation Age of Onset    Cancer Mother 45        lung    Neurologic Disorder Mother         epilepsy    Lipids Father     Gastrointestinal Disease Father         diverticulitis     Depression Father     Colitis Father     Colon Cancer Father     Diverticulitis Father     Depression Sister     Cancer Maternal Grandmother     Blood Disease Maternal Grandmother         lymphoma     Arthritis Maternal Grandmother     Diabetes Maternal Grandmother     Depression Maternal Grandmother     Macular Degeneration Maternal Grandmother     Glaucoma Maternal Grandmother     Diabetes Maternal Grandfather     Cerebrovascular Disease Maternal Grandfather     Blood Disease Maternal Grandfather     Heart Disease Maternal Grandfather     Glaucoma Maternal Grandfather     Cancer Paternal Grandmother     Cancer - colorectal Paternal Grandmother     Colitis Paternal Grandmother     Colon Cancer Paternal Grandmother     Diverticulitis Paternal Grandmother     Respiratory Paternal Grandfather         emphysema     Colitis Paternal Grandfather     Colon  "Cancer Paternal Grandfather     Diverticulitis Paternal Grandfather     Heart Disease Daughter     Asthma Daughter     Melanoma No family hx of     Anesthesia Reaction No family hx of     Clotting Disorder No family hx of        Allergies:   Allergies   Allergen Reactions    Bee Venom Swelling    Azithromycin Diarrhea    Erythromycin      Other reaction(s): GI intolerance, Vomiting    Fentanyl Other (See Comments)     sweating  sweating    Prochlorperazine Fatigue     Other reaction(s): Other (see comments)  Fatigue    Buspirone      Other reaction(s): GI intolerance  vomiting    Erythrocin Nausea and Vomiting    Gluten Meal      Celiac disease    Topamax [Topiramate]      Made her lethargic    Zithromax [Azithromycin Dihydrate] Diarrhea    Enbrel [Etanercept] Hives and Rash       Active Medications:   No current outpatient medications on file.       Systemic Review:   CONSTITUTIONAL: NEGATIVE for fever, chills, change in weight  ENT/MOUTH: NEGATIVE for ear, mouth and throat problems  RESP: NEGATIVE for significant cough or SOB  CV: NEGATIVE for chest pain, palpitations or peripheral edema    Physical Examination:   Vital Signs: /66   Pulse 74   Resp 16   Ht 1.6 m (5' 3\")   Wt 87.5 kg (193 lb)   SpO2 100%   BMI 34.19 kg/m    GENERAL: healthy, alert and no distress  NECK: no adenopathy, no asymmetry, masses, or scars  RESP: lungs clear to auscultation - no rales, rhonchi or wheezes  CV: regular rate and rhythm, normal S1 S2, no S3 or S4, no murmur, click or rub, no peripheral edema and peripheral pulses strong  ABDOMEN: soft, nontender, no hepatosplenomegaly, no masses and bowel sounds normal  MS: no gross musculoskeletal defects noted, no edema    Plan: Appropriate to proceed as scheduled.      Renard Davey MD  8/7/2024    PCP:  Anna Naidu    "

## 2024-08-07 NOTE — OR NURSING
Pt refusing pregnancy test. States no chance she could be pregnant. Verbalized understanding of risks.

## 2024-08-07 NOTE — ANESTHESIA PREPROCEDURE EVALUATION
Anesthesia Pre-Procedure Evaluation    Patient: Doretha Yu   MRN: [id number] : 1975        Procedure : Procedure(s):  Esophagoscopy, gastroscopy, duodenoscopy (EGD), combined  Pouchoscopy          Past Medical History:   Diagnosis Date    Abnormal MRI     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     Anxiety     Basal cell carcinoma     Cervical high risk HPV (human papillomavirus) test positive 2019    See problem list    Colitis     Depression     Diabetes mellitus, iatrogenic (H) 2020    Esophageal reflux     Inflammatory arthritis     Insomnia     Intestinal giardiasis 2018    Lumbago     left lower back pain    Lymphedema     Malignant melanoma (H)     Melanoma (H) 10/23/2017    Migraines     Mild persistent asthma     Morbid obesity (H) 2019    Morbid obesity with BMI of 40.0-44.9, adult (H)     STORMY (obstructive sleep apnea)     Prothrombin deficiency (H)     takes 81mg asa daily    Stroke (cerebrum) (H)     During     TIA (transient ischemic attack)     Type 2 diabetes mellitus (H)     Ulcerative pancolitis (H)       Past Surgical History:   Procedure Laterality Date    APPENDECTOMY      COLONOSCOPY N/A 10/18/2017    Procedure: COLONOSCOPY;  Colon;  Surgeon: Debbie Stephens MD;  [facility]    COLONOSCOPY N/A 2018    Procedure: COMBINED COLONOSCOPY, SINGLE OR MULTIPLE BIOPSY/POLYPECTOMY BY BIOPSY;  colon;  Surgeon: Benita Schumacher MD;  [facility]    COLONOSCOPY      multiple since  to present - about 6 total    DAVINCI ASSISTED TRANSANAL TOTAL MESORECTAL EXCISION N/A 2023    Procedure: COMPLETION PROCTECTOMY, ROBOT-ASSISTED, ILEAL POUCH ANASTAMOSIS;  Surgeon: Quinton Ram MD;  [facility]    DAVINCI HERNIORRHAPHY VENTRAL N/A 2024    Procedure: HERNIORRHAPHY, VENTRAL, ROBOT-ASSISTED;  Surgeon: Arcadio Carrillo MD;  Location[facility]    DISSECT LYMPH NODE AXILLA Left 10/23/2017    Procedure:  DISSECT LYMPH NODE AXILLA;  Left Axillary Lymph Node Dissection ;  Surgeon: Laurent Cool MD;  [facility]    EXAM UNDER ANESTHESIA PELVIC N/A 2020    Procedure: EXAM UNDER ANESTHESIA, PELVIS; with Cervical Biopsies, Vaginal Biopsy and Endocervical Curettings;  Surgeon: Melina Jung MD;  [facility]    GYN SURGERY  ,         LAPAROSCOPIC ASSISTED COLECTOMY N/A 06/15/2021    Procedure: laparoscopic total abdominal colectomy, end ileostomy;  Surgeon: Quinton Ram MD;  [facility]    REPAIR MOHS Left 2017    Procedure: REPAIR MOHS;  Left Upper Lid Moh's Reconstruction;  Surgeon: Kisha Bosch MD;  [facility]    SIGMOIDOSCOPY FLEXIBLE N/A 2023    Procedure: Sigmoidoscopy flexible;  Surgeon: Quinton Ram MD;  [facility]    TAKEDOWN ILEOSTOMY N/A 2023    Procedure: CLOSURE, ILEOSTOMY;  Surgeon: Quinton Ram MD;  [facility]      Allergies   Allergen Reactions    Bee Venom Swelling    Azithromycin Diarrhea    Erythromycin      Other reaction(s): GI intolerance, Vomiting    Fentanyl Other (See Comments)     sweating  sweating    Prochlorperazine Fatigue     Other reaction(s): Other (see comments)  Fatigue    Buspirone      Other reaction(s): GI intolerance  vomiting    Erythrocin Nausea and Vomiting    Gluten Meal      Celiac disease    Topamax [Topiramate]      Made her lethargic    Zithromax [Azithromycin Dihydrate] Diarrhea    Enbrel [Etanercept] Hives and Rash      Social History     Tobacco Use    Smoking status: Former     Current packs/day: 0.00     Average packs/day: 1 pack/day for 5.0 years (5.0 ttl pk-yrs)     Types: Cigarettes     Start date: 3/20/1993     Quit date: 3/20/1998     Years since quittin.4     Passive exposure: Past    Smokeless tobacco: Never   Substance Use Topics    Alcohol use: Not Currently      Wt Readings from Last 1 Encounters:   24 87.5 kg (193 lb)        Anesthesia  Evaluation            ROS/MED HX  ENT/Pulmonary:     (+) sleep apnea, doesn't use CPAP,                   Intermittent, asthma                  Neurologic:     (+)          CVA (during  ),                      Cardiovascular:    (-) hypertension and dyslipidemia   METS/Exercise Tolerance: 3 - Able to walk 1-2 blocks without stopping    Hematologic:       Musculoskeletal:   (+)  arthritis (inflammatory),             GI/Hepatic:     (+) GERD, Asymptomatic on medication,                  Renal/Genitourinary:    (-) renal disease   Endo:     (+)  type II DM,             Obesity,       Psychiatric/Substance Use:     (+) psychiatric history anxiety       Infectious Disease:       Malignancy:   (+) Malignancy, History of Skin.    Other:      (+)  , H/O Chronic Pain,         Physical Exam    Airway        Mallampati: II   TM distance: > 3 FB   Neck ROM: full   Mouth opening: > 3 cm    Respiratory Devices and Support         Dental       (+) Minor Abnormalities - some fillings, tiny chips      Cardiovascular   cardiovascular exam normal          Pulmonary   pulmonary exam normal                OUTSIDE LABS:  CBC:   Lab Results   Component Value Date    WBC 8.2 07/15/2024    WBC 9.8 06/10/2024    HGB 11.6 (L) 07/15/2024    HGB 11.9 06/10/2024    HCT 35.1 07/15/2024    HCT 36.6 06/10/2024     07/15/2024     06/10/2024     BMP:   Lab Results   Component Value Date     07/15/2024     2024    POTASSIUM 4.1 07/15/2024    POTASSIUM 3.9 2024    CHLORIDE 104 07/15/2024    CHLORIDE 102 2024    CO2 20 (L) 07/15/2024    CO2 19 (L) 2024    BUN 12.7 07/15/2024    BUN 9.5 2024    CR 0.80 07/15/2024    CR 0.69 2024    GLC 92 2024     (H) 07/15/2024     COAGS:   Lab Results   Component Value Date    PTT 29 10/30/2023    INR 1.07 10/30/2023     POC:   Lab Results   Component Value Date     (H) 2021    HCG Negative 2024    HCGS Negative  "08/14/2022     HEPATIC:   Lab Results   Component Value Date    ALBUMIN 4.4 06/10/2024    PROTTOTAL 7.2 06/10/2024    ALT 24 06/10/2024    AST 20 06/10/2024    ALKPHOS 80 06/10/2024    BILITOTAL <0.2 06/10/2024     OTHER:   Lab Results   Component Value Date    LACT 0.8 10/30/2023    A1C 5.6 06/10/2024    PATRICIA 9.4 07/15/2024    PHOS 4.6 (H) 12/29/2023    MAG 1.8 12/29/2023    LIPASE 82 (H) 03/29/2023    AMYLASE 19 (L) 10/30/2023    TSH 2.55 10/30/2023    T4 0.86 04/14/2022    CRP 18.3 (H) 04/14/2022    SED 35 (H) 12/18/2023       Anesthesia Plan    ASA Status:  2    NPO Status:  NPO Appropriate    Anesthesia Type: MAC.     - Reason for MAC: immobility needed, straight local not clinically adequate              Consents    Anesthesia Plan(s) and associated risks, benefits, and realistic alternatives discussed. Questions answered and patient/representative(s) expressed understanding.     - Discussed:     - Discussed with:  Patient            Postoperative Care    Pain management: IV analgesics.   PONV prophylaxis: Ondansetron (or other 5HT-3)     Comments:               Branden Kaplan MD    I have reviewed the pertinent notes and labs in the chart from the past 30 days and (re)examined the patient.  Any updates or changes from those notes are reflected in this note.              # Obesity: Estimated body mass index is 34.19 kg/m  as calculated from the following:    Height as of this encounter: 1.6 m (5' 3\").    Weight as of this encounter: 87.5 kg (193 lb).      "

## 2024-08-08 ENCOUNTER — TELEPHONE (OUTPATIENT)
Dept: FAMILY MEDICINE | Facility: CLINIC | Age: 49
End: 2024-08-08
Payer: COMMERCIAL

## 2024-08-08 NOTE — TELEPHONE ENCOUNTER
Patient Quality Outreach    Patient is due for the following:   Mammo    Next Steps:   - Schedule mammogram  - Schedule med check with non fasting labs this September  - ACT    Type of outreach:    Sent [employer] message.      Questions for provider review:    None           Ruth Ann Gudino, CMA

## 2024-08-09 ENCOUNTER — CARE COORDINATION (OUTPATIENT)
Dept: GASTROENTEROLOGY | Facility: CLINIC | Age: 49
End: 2024-08-09
Payer: COMMERCIAL

## 2024-08-09 DIAGNOSIS — K20.80 ESOPHAGITIS, LOS ANGELES GRADE B: Primary | ICD-10-CM

## 2024-08-09 DIAGNOSIS — K29.60 EROSIVE GASTRITIS: ICD-10-CM

## 2024-08-09 RX ORDER — ESOMEPRAZOLE MAGNESIUM 40 MG/1
40 CAPSULE, DELAYED RELEASE ORAL
Qty: 60 CAPSULE | Refills: 3 | Status: SHIPPED | OUTPATIENT
Start: 2024-08-09

## 2024-08-20 ENCOUNTER — TELEPHONE (OUTPATIENT)
Dept: SLEEP MEDICINE | Facility: CLINIC | Age: 49
End: 2024-08-20

## 2024-08-20 DIAGNOSIS — G47.19 EXCESSIVE DAYTIME SLEEPINESS: ICD-10-CM

## 2024-08-20 DIAGNOSIS — G47.33 OSA (OBSTRUCTIVE SLEEP APNEA): Primary | ICD-10-CM

## 2024-08-20 NOTE — TELEPHONE ENCOUNTER
Received following email please review. If any questions please reach out to financial securing listed below.    Hello!    We received a denial for the below patient, we would need to appeal with [email or web address] [place] Denial Notification     Patient Name:                        Doretha Yu  :                                       1975  MRN:                                       [id number]     Dr. Goltz,     The authorization for procedure 19372 on date of service 2024 has been denied by the patient's insurance for the following reason:     Denial Reason:        Below is the information we provided to the patient's insurance company:     Order:                          PSG ORDER  Visit Notes:                  VV 2024  Results:                       PSG RESULTS  Other:                          N/A     An appeal can be filed for possible decision reversal. This can take up to 30 days for the insurance to process once submitted. If you wish to proceed with an appeal, please provide additional clinical records and a letter explaining why you feel this service is medically necessary.     Thank you,     Kaia Oropeza Prior Authorization

## 2024-08-30 NOTE — TELEPHONE ENCOUNTER
Hello Dr Goltz,   The plan has indicated on 8/29/24 a medical director reviewed the request and made their decision based on the information provided, their policies and terms of the benefit document.   Review results: The case remains denied because it does not meet medical necessity policy criteria.   This is the medical policy link they are referencing:     [email or web address]          The letter indicates we may start a new request with new information, do an appeal if we disagree, or you can schedule a peer to peer with-in 10 business days of the letter to discuss the denial with the medical director by calling [phone].    Please advise.    BRAXTON Lewis  Authorization Specialist [facility]   P: [phone]  F: [phone]

## 2024-09-04 NOTE — TELEPHONE ENCOUNTER
In lab PSG was denied due to her not having heart disease, lung disease or neuromuscular disease. An HST is the preferred test. I informed her that the HST would be covered. She was under the impression that she needed a CPAP to be able to do the home study and her CPAP is broken. I informed her that the test kit is independent of CPAP and would be performed to verify she qualifies for coverage of a CPAP. I am placing the order for a WatchPAT One study since she lives in [place]. I would also like the additional data regarding her sleep staging.  Order for a WatchPAT One study was placed. Her follow-up may need to be moved if the test is booking out past 12/16.  Bennett Goltz, PA-C

## 2024-09-12 ENCOUNTER — TELEPHONE (OUTPATIENT)
Dept: SLEEP MEDICINE | Facility: CLINIC | Age: 49
End: 2024-09-12
Payer: COMMERCIAL

## 2024-09-12 NOTE — TELEPHONE ENCOUNTER
General Call      Reason for Call:  I called patient to schedule her Watchpat test but wanted someone from the clinic explain the denial from her insurance for In lab PSG test. Patient states according to her insurance she gets 1 visit a year and when submitted we said this would be a repeat test for this year. I did read the message from Bennett Goltz that he said on 9/4/24 in TE 8/20 but she would still like someone to call her.    Date of last appointment with provider: 05/13/24 Bennett Goltz    Could we send this information to you in [facility] or would you prefer to receive a phone call?:   Patient would prefer a phone call   Okay to leave a detailed message?: Yes at Cell number on file:    Telephone Information:   Mobile [phone]       Patient declined to schedule the watchpat until she talked with someone regarding this.     Isaura Gray   [facility]  Central Scheduler

## 2024-09-24 ENCOUNTER — OFFICE VISIT (OUTPATIENT)
Dept: ORTHOPEDICS | Facility: CLINIC | Age: 49
End: 2024-09-24
Payer: COMMERCIAL

## 2024-09-24 VITALS — BODY MASS INDEX: 34.19 KG/M2 | HEIGHT: 63 IN

## 2024-09-24 DIAGNOSIS — M75.31 CALCIFIC TENDINITIS OF BOTH SHOULDERS: Primary | ICD-10-CM

## 2024-09-24 DIAGNOSIS — M75.32 CALCIFIC TENDINITIS OF BOTH SHOULDERS: Primary | ICD-10-CM

## 2024-09-24 PROCEDURE — 20611 DRAIN/INJ JOINT/BURSA W/US: CPT | Mod: 50 | Performed by: FAMILY MEDICINE

## 2024-09-24 RX ORDER — BETAMETHASONE SODIUM PHOSPHATE AND BETAMETHASONE ACETATE 3; 3 MG/ML; MG/ML
6 INJECTION, SUSPENSION INTRA-ARTICULAR; INTRALESIONAL; INTRAMUSCULAR; SOFT TISSUE
Status: SHIPPED | OUTPATIENT
Start: 2024-09-24

## 2024-09-24 RX ORDER — ROPIVACAINE HYDROCHLORIDE 5 MG/ML
4 INJECTION, SOLUTION EPIDURAL; INFILTRATION; PERINEURAL
Status: SHIPPED | OUTPATIENT
Start: 2024-09-24

## 2024-09-24 RX ADMIN — ROPIVACAINE HYDROCHLORIDE 4 ML: 5 INJECTION, SOLUTION EPIDURAL; INFILTRATION; PERINEURAL at 11:17

## 2024-09-24 RX ADMIN — BETAMETHASONE SODIUM PHOSPHATE AND BETAMETHASONE ACETATE 6 MG: 3; 3 INJECTION, SUSPENSION INTRA-ARTICULAR; INTRALESIONAL; INTRAMUSCULAR; SOFT TISSUE at 11:17

## 2024-09-24 ASSESSMENT — PAIN SCALES - GENERAL: PAINLEVEL: MODERATE PAIN (5)

## 2024-09-24 NOTE — PROGRESS NOTES
ASSESSMENT & PLAN    Doretha was seen today for pain and pain.    Diagnoses and all orders for this visit:    Calcific tendinitis of both shoulders  -     Large Joint Injection/Arthocentesis: bilateral subacromial bursa        # Bilateral Calcific Tendinitis: Doretha Yu  was seen today for acute on chronic bilateral shoulder pain. Symptoms had been going on for years, worsening over the past few months. On examination there are positive findings of tenderness to palpation over the rotator cuff tendons, pain limiting range of motion. Imaging findings showed significant calcific tendinitis over the shoulders, mild shoulder joint arthritis. Likely cause of patient's condition due to calcific tendinitis. Pain affecting sleep. No radicular symptoms. Other possible conditions contributing to symptoms include shoulder arthritis.  Previous subacromial bursa steroid injections on 4/15/24 helped pain for several months. Counseled patient on next steps including possible tenotomy procedure to remove calcification as well as risks and benefits of the procedure. Information given to patient. Given this plan as below, follow-up 1 mon as needed.     Image Findings: bilateral shoulder calcific tendinitis, mild shoulder arthritis  Treatment: Activities as tolerated, continue home exercises  Medications/Injections: Limited tylenol/ibuprofen for pain for 1-2 weeks, Topical Voltaren gel, repeat bilateral rotator cuff tendon steroid injections  Follow-up: In one month if symptoms do not improve, sooner if worsening  Can consider procedure to remove calcific tendinitis    -----    SUBJECTIVE:  oDretha Yu is a 49 year old female who is seen in follow-up for bilateral shoulder pain. They were last seen 4/15/2024 and bilateral subacromial steroid injections were performed.  Provided good relief for a few months. The patient is seen by themselves.    Since their last visit reports returned right worse than left shoulder pain.  They  "indicate that their current pain level is 5/10. They have tried bilateral subacromial steroid injections 4/15/24, Tylenol, medications for arthritis.        Patient's past medical, surgical, social, and family histories were reviewed today and no changes are noted.    REVIEW OF SYSTEMS:  Constitutional: NEGATIVE for fever, chills, change in weight  Skin: NEGATIVE for worrisome rashes, moles or lesions  GI/: NEGATIVE for bowel or bladder changes  Neuro: NEGATIVE for weakness, dizziness or paresthesias    OBJECTIVE:  Ht 1.6 m (5' 3\")   BMI 34.19 kg/m     General: healthy, alert and in no distress  HEENT: no scleral icterus or conjunctival erythema  Skin: no suspicious lesions or rash. No jaundice.  CV: regular rhythm by palpation, no pedal edema  Resp: normal respiratory effort without conversational dyspnea   Psych: normal mood and affect  Gait: normal steady gait with appropriate coordination and balance  Neuro: normal light touch sensory exam of the extremities.    MSK:    BILATERAL SHOULDER  Inspection:    no swelling, bruising, discoloration, or obvious deformity or asymmetry  Palpation:    Tender about the supraspinatus insertion. Remainder of bony and tendinous landmarks are nontender.  Active Range of Motion:     Abduction 1650, FF 1650, , IR L1.    Strength:    Scapular plane abduction 5/5, painful,  ER 5/5, IR 5/5, biceps 5/5, triceps 5/5       Independent visualization of the below image:    Reviewed bilateral shoulder x-rays with patient.    Shubham Sousa MD, [facility]    Disclaimer: This note consists of symbols derived from keyboarding, dictation and/or voice recognition software. As a result, there may be errors in the script that have gone undetected. Please consider this when interpreting information found in this chart.    Large Joint Injection/Arthocentesis: bilateral subacromial bursa    Date/Time: 9/24/2024 11:17 AM    Performed by: Shubham Sousa, " MD  Authorized by: Shubham Sousa MD    Indications:  Pain and osteoarthritis  Needle Size:  25 G  Guidance: ultrasound    Approach:  Lateral  Location:  Shoulder  Laterality:  Bilateral      Site:  Bilateral subacromial bursa  Medications (Right):  6 mg betamethasone acet & sod phos 6 (3-3) MG/ML; 4 mL ROPivacaine 5 MG/ML  Medications (Left):  6 mg betamethasone acet & sod phos 6 (3-3) MG/ML; 4 mL ROPivacaine 5 MG/ML  Outcome:  Tolerated well, no immediate complications  Procedure discussed: discussed risks, benefits, and alternatives    Consent Given by:  Patient  Timeout: timeout called immediately prior to procedure    Prep: patient was prepped and draped in usual sterile fashion     Ultrasound images of procedure were permanently stored.     Patient reported significant improvement of pain after the numbing portion bilateral subacromial bursa steroid injections into calcific tendinitis.  Ultrasound guided images were permanently stored.   Aftercare instructions given to patient.  Plan to follow-up as discussed above.     Shubham Sousa MD [facility]

## 2024-09-24 NOTE — PATIENT INSTRUCTIONS
# Bilateral Calcific Tendinitis: Doretha Yu  was seen today for acute on chronic bilateral shoulder pain. Symptoms had been going on for years, worsening over the past few months. On examination there are positive findings of tenderness to palpation over the rotator cuff tendons, pain limiting range of motion. Imaging findings showed significant calcific tendinitis over the shoulders, mild shoulder joint arthritis. Likely cause of patient's condition due to calcific tendinitis. Pain affecting sleep. No radicular symptoms. Other possible conditions contributing to symptoms include shoulder arthritis.  Previous subacromial bursa steroid injections on 4/15/24 helped pain for several months. Counseled patient on nature of condition and treatment options.  Given this plan as below, follow-up 1 mon as needed.     Image Findings: bilateral shoulder calcific tendinitis, mild shoulder arthritis  Treatment: Activities as tolerated, continue home exercises  Medications/Injections: Limited tylenol/ibuprofen for pain for 1-2 weeks, Topical Voltaren gel, repeat bilateral rotator cuff tendon steroid injections  Follow-up: In one month if symptoms do not improve, sooner if worsening  Can consider procedure to remove calcific tendinitis       Please call [phone]   Ask for my team if you have any questions or concerns    If you have not yet received the influenza vaccine but would like to get one, please call  [phone] or you can schedule via [employer]    It was great seeing you again today!    Shubham Sousa MD, [facility]     [facility] Injection Discharge Instructions    Procedure: bilateral subacromial bursa steroid injections into calcifications    You may shower, however avoid swimming, tub baths or hot tubs for 24 hours following your procedure  You may have a mild to moderate increase in pain for several days following the injection.  It may take up to 14 days for the steroid medication to start working although you may  feel the effect as early as a few days after the procedure.  You may use ice packs for 10-15 minutes, 3 to 4 times a day at the injection site for comfort  You may use anti-inflammatory medications (such as Ibuprofen or Aleve or Advil) or Tylenol for pain control if necessary  If you were fasting, you may resume your normal diet and medications after the procedure  If you have diabetes, check your blood sugar more frequently than usual as your blood sugar may be higher than normal for 10-14 days following a steroid injection. Contact your doctor who manages your diabetes if your blood sugar is higher than usual    If you experience any of the following, call [facility] @ [phone] or [phone]  -Fever over 100 degree F  -Swelling, bleeding, redness, drainage, warmth at the injection site  - New or worsening pain

## 2024-09-24 NOTE — LETTER
9/24/2024      Doretha Yu  [address] [address] [address]      Dear Colleague,    Thank you for referring your patient, Doretha Yu, to the [facility] [place]. Please see a copy of my visit note below.    ASSESSMENT & PLAN    Doretha was seen today for pain and pain.    Diagnoses and all orders for this visit:    Calcific tendinitis of both shoulders  -     Large Joint Injection/Arthocentesis: bilateral subacromial bursa        # Bilateral Calcific Tendinitis: Doretha Yu  was seen today for acute on chronic bilateral shoulder pain. Symptoms had been going on for years, worsening over the past few months. On examination there are positive findings of tenderness to palpation over the rotator cuff tendons, pain limiting range of motion. Imaging findings showed significant calcific tendinitis over the shoulders, mild shoulder joint arthritis. Likely cause of patient's condition due to calcific tendinitis. Pain affecting sleep. No radicular symptoms. Other possible conditions contributing to symptoms include shoulder arthritis.  Previous subacromial bursa steroid injections on 4/15/24 helped pain for several months. Counseled patient on next steps including possible tenotomy procedure to remove calcification as well as risks and benefits of the procedure. Information given to patient. Given this plan as below, follow-up 1 mon as needed.     Image Findings: bilateral shoulder calcific tendinitis, mild shoulder arthritis  Treatment: Activities as tolerated, continue home exercises  Medications/Injections: Limited tylenol/ibuprofen for pain for 1-2 weeks, Topical Voltaren gel, repeat bilateral rotator cuff tendon steroid injections  Follow-up: In one month if symptoms do not improve, sooner if worsening  Can consider procedure to remove calcific tendinitis    -----    SUBJECTIVE:  Doretha Yu is a 49 year old female who is seen in follow-up for bilateral shoulder pain. They were  "last seen 4/15/2024 and bilateral subacromial steroid injections were performed.  Provided good relief for a few months. The patient is seen by themselves.    Since their last visit reports returned right worse than left shoulder pain.  They indicate that their current pain level is 5/10. They have tried bilateral subacromial steroid injections 4/15/24, Tylenol, medications for arthritis.        Patient's past medical, surgical, social, and family histories were reviewed today and no changes are noted.    REVIEW OF SYSTEMS:  Constitutional: NEGATIVE for fever, chills, change in weight  Skin: NEGATIVE for worrisome rashes, moles or lesions  GI/: NEGATIVE for bowel or bladder changes  Neuro: NEGATIVE for weakness, dizziness or paresthesias    OBJECTIVE:  Ht 1.6 m (5' 3\")   BMI 34.19 kg/m     General: healthy, alert and in no distress  HEENT: no scleral icterus or conjunctival erythema  Skin: no suspicious lesions or rash. No jaundice.  CV: regular rhythm by palpation, no pedal edema  Resp: normal respiratory effort without conversational dyspnea   Psych: normal mood and affect  Gait: normal steady gait with appropriate coordination and balance  Neuro: normal light touch sensory exam of the extremities.    MSK:    BILATERAL SHOULDER  Inspection:    no swelling, bruising, discoloration, or obvious deformity or asymmetry  Palpation:    Tender about the supraspinatus insertion. Remainder of bony and tendinous landmarks are nontender.  Active Range of Motion:     Abduction 1650, FF 1650, , IR L1.    Strength:    Scapular plane abduction 5/5, painful,  ER 5/5, IR 5/5, biceps 5/5, triceps 5/5       Independent visualization of the below image:    Reviewed bilateral shoulder x-rays with patient.    Shubham Sousa MD, [facility]    Disclaimer: This note consists of symbols derived from keyboarding, dictation and/or voice recognition software. As a result, there may be errors in the " script that have gone undetected. Please consider this when interpreting information found in this chart.    Large Joint Injection/Arthocentesis: bilateral subacromial bursa    Date/Time: 9/24/2024 11:17 AM    Performed by: Shubham Sousa MD  Authorized by: Shubham Sousa MD    Indications:  Pain and osteoarthritis  Needle Size:  25 G  Guidance: ultrasound    Approach:  Lateral  Location:  Shoulder  Laterality:  Bilateral      Site:  Bilateral subacromial bursa  Medications (Right):  6 mg betamethasone acet & sod phos 6 (3-3) MG/ML; 4 mL ROPivacaine 5 MG/ML  Medications (Left):  6 mg betamethasone acet & sod phos 6 (3-3) MG/ML; 4 mL ROPivacaine 5 MG/ML  Outcome:  Tolerated well, no immediate complications  Procedure discussed: discussed risks, benefits, and alternatives    Consent Given by:  Patient  Timeout: timeout called immediately prior to procedure    Prep: patient was prepped and draped in usual sterile fashion     Ultrasound images of procedure were permanently stored.     Patient reported significant improvement of pain after the numbing portion bilateral subacromial bursa steroid injections into calcific tendinitis.  Ultrasound guided images were permanently stored.   Aftercare instructions given to patient.  Plan to follow-up as discussed above.     Shubham Sousa MD [facility]            Again, thank you for allowing me to participate in the care of your patient.        Sincerely,        Shubham Sousa MD

## 2024-09-26 ENCOUNTER — TELEPHONE (OUTPATIENT)
Dept: FAMILY MEDICINE | Facility: CLINIC | Age: 49
End: 2024-09-26
Payer: COMMERCIAL

## 2024-09-26 NOTE — TELEPHONE ENCOUNTER
Patient Quality Outreach    Patient is due for the following:   Mammogram    Next Steps:   - Schedule mammogram  - Vaccine update  - Schedule DM visit and A1c  - ACT    Type of outreach:    Sent [employer] message.      Questions for provider review:    None           Ruth Ann Gudino, CMA

## 2024-10-23 NOTE — TELEPHONE ENCOUNTER
Patient scheduled for a WatchPAT mail out. WatchPAT information sheet sent to patient's home address.  Johanna Rodgers, [place]

## 2024-11-10 ENCOUNTER — HEALTH MAINTENANCE LETTER (OUTPATIENT)
Age: 49
End: 2024-11-10

## 2024-11-11 DIAGNOSIS — F32.2 CURRENT SEVERE EPISODE OF MAJOR DEPRESSIVE DISORDER WITHOUT PSYCHOTIC FEATURES WITHOUT PRIOR EPISODE (H): ICD-10-CM

## 2024-11-11 NOTE — TELEPHONE ENCOUNTER
Requested Prescriptions   Pending Prescriptions Disp Refills    ARIPiprazole (ABILIFY) 5 MG tablet [Pharmacy Med Name: ARIPIPRAZOLE 5MG TABS] 90 tablet 0     Sig: TAKE ONE TABLET BY MOUTH ONCE DAILY       Antipsychotic Medications Failed - 11/11/2024  3:52 PM        Failed - PHQ9 score less than 5 in past 6 monts     Please review last PHQ-9 score.           Passed - Most recent blood pressure under 140/90 in past 12 months        Passed - Patient is 12 years of age or older        Passed - Lipid panel on file within the past 12 months     Recent Labs   Lab Test 07/15/24  0920   CHOL 201*   TRIG 181*   HDL 49*   *   NHDL 152*               Passed - Heart Rate on file within past 12 months     Pulse Readings from Last 3 Encounters:   08/07/24 74   07/16/24 84   07/15/24 84               Passed - A1c or Glucose on file in past 12 months     Recent Labs   Lab Test 07/16/24  0923 07/15/24  0920 06/10/24  1406   GLC 92   < >  --    A1C  --   --  5.6    < > = values in this interval not displayed.       Please review patients last 3 weights. If a weight gain of >10 lbs exists, you may refill the prescription once after instructing the patient to schedule an appointment within the next 30 days.    Wt Readings from Last 3 Encounters:   08/07/24 87.5 kg (193 lb)   07/16/24 87.5 kg (193 lb)   07/15/24 87.5 kg (193 lb)             Passed - Medication is active on med list        Passed - Recent (12 month) or future (90 days) visit with authorizing provider s specialty     The patient must have completed an in-person or virtual visit within the past 12 months or has a future visit scheduled within the next 90 days with the authorizing provider s specialty.  Urgent care and e-visits do not quality as an office visit for this protocol.          Passed - Medication indicated for associated diagnosis     Medication is associated with one or more of the following diagnoses:             Agitation            Autistic disorder             Bipolar disorder            Chemotherapy-induced nausea and vomiting            Delirium            Depression            Schizophrenia             Mood disorder            Passed - Patient is not pregnant        Passed - No positve pregnancy test on file in past 12 months

## 2024-11-12 RX ORDER — ARIPIPRAZOLE 5 MG/1
5 TABLET ORAL DAILY
Qty: 90 TABLET | Refills: 0 | Status: SHIPPED | OUTPATIENT
Start: 2024-11-12

## 2024-12-06 ENCOUNTER — MYC MEDICAL ADVICE (OUTPATIENT)
Dept: FAMILY MEDICINE | Facility: CLINIC | Age: 49
End: 2024-12-06
Payer: COMMERCIAL

## 2024-12-09 NOTE — TELEPHONE ENCOUNTER
I spoke with patient and she reports the red streaks disappeared, no fever or chills.. She is feeling fine  and declines appt or triage. Pt will call back if further concerns  Soumya Castle RN on 12/9/2024 at 1:51 PM

## 2025-01-08 ENCOUNTER — PATIENT OUTREACH (OUTPATIENT)
Dept: CARE COORDINATION | Facility: CLINIC | Age: 50
End: 2025-01-08
Payer: COMMERCIAL

## 2025-01-15 NOTE — TELEPHONE ENCOUNTER
"Requested Prescriptions   Pending Prescriptions Disp Refills     rOPINIRole (REQUIP) 0.25 MG tablet  Last Written Prescription Date:  11/22/2017  Last Fill Quantity: 30,  # refills: 11   Last office visit: 11/14/2018 with prescribing provider:  Evangelista    Future Office Visit:   Next 5 appointments (look out 90 days)     Jan 30, 2019 11:00 AM CST   Return Visit with Lowell Bullock MD   [facility]    [address] [id number]   [phone]                  30 tablet 11     Sig: Take 1 tablet (0.25 mg) by mouth At Bedtime    Antiparkinson's Agents Protocol Passed    12/3/2018  8:48 AM       Passed - Blood pressure under 140/90 in past 12 months    BP Readings from Last 3 Encounters:   11/28/18 136/81   11/14/18 104/64   11/13/18 108/68                Passed - CBC on record in past 12 months    Recent Labs   Lab Test  11/13/18 1926   WBC  15.3*   RBC  4.48   HGB  12.2   HCT  38.2   PLT  375                Passed - ALT on record in past 12 months        Recent Labs   Lab Test  11/13/18 1926   ALT  29            Passed - Serum Creatinine on file in past 12 months    Recent Labs   Lab Test  11/13/18 1926   CR  0.64            Passed - Patient is age 18 or older       Passed - No active pregnancy on record       Passed - No positive pregnancy test in the past 12 months       Passed - Recent (6 mo) or future (30 days) visit within the authorizing provider's specialty    Patient had office visit in the last 6 months or has a visit in the next 30 days with authorizing provider or within the authorizing provider's specialty.  See \"Patient Info\" tab in inbasket, or \"Choose Columns\" in Meds & Orders section of the refill encounter.              " Mohs Case Number: [id number] Date Of Previous Biopsy (Optional): 12/19/24 Biopsy Photograph Reviewed: Yes Referring Physician (Optional): Heather Redman MD Consent Type: Consent 1 (Standard) Eye Shield Used: No Initial Size Of Lesion: 0.7 X Size Of Lesion In Cm (Optional): 1.2 Number Of Stages: 1 Primary Defect Length In Cm (Final Defect Size - Required For Flaps/Grafts): 1.3 Primary Defect Width In Cm (Final Defect Size - Required For Flaps/Grafts): 2 Primary Defect Depth In Cm (Optional But Required For Some Insurers): 0 Repair Type: No repair - secondary intention Which Instrument Did You Use For Dermabrasion?: Wire Brush Which Eyelid Repair Cpt Are You Using?: 73213 Oculoplastic Surgeon Procedure Text (A): After obtaining clear surgical margins the patient was sent to oculoplastics for surgical repair.  The patient understands they will receive post-surgical care and follow-up from the referring physician's office. Otolaryngologist Procedure Text (A): After obtaining clear surgical margins the patient was sent to otolaryngology for surgical repair.  The patient understands they will receive post-surgical care and follow-up from the referring physician's office. Plastic Surgeon Procedure Text (A): After obtaining clear surgical margins the patient was sent to plastics for surgical repair.  The patient understands they will receive post-surgical care and follow-up from the referring physician's office. Mid-Level Procedure Text (A): After obtaining clear surgical margins the patient was sent to a mid-level provider for surgical repair.  The patient understands they will receive post-surgical care and follow-up from the mid-level provider. Provider Procedure Text (A): After obtaining clear surgical margins the defect was repaired by another provider. Asc Procedure Text (A): After obtaining clear surgical margins the patient was sent to an ASC for surgical repair.  The patient understands they will receive post-surgical care and follow-up from the ASC physician. Suturegard Retention Suture: 2-0 Nylon Retention Suture Bite Size: 3 mm Length To Time In Minutes Device Was In Place: 10 Undermining Type: Entire Wound Debridement Text: The wound edges were debrided prior to proceeding with the closure to facilitate wound healing. Helical Rim Text: The closure involved the helical rim. Vermilion Border Text: The closure involved the vermilion border. Nostril Rim Text: The closure involved the nostril rim. Retention Suture Text: Retention sutures were placed to support the closure and prevent dehiscence. Area H Indication Text: Tumors in this location are included in Area H (eyelids, eyebrows, nose, lips, chin, ear, pre-auricular, post-auricular, temple, genitalia, hands, feet, ankles and areola).  Tissue conservation is critical in these anatomic locations. Area M Indication Text: Tumors in this location are included in Area M (cheek, forehead, scalp, neck, jawline and pretibial skin).  Mohs surgery is indicated for tumors in these anatomic locations. Area L Indication Text: Tumors in this location are included in Area L (trunk and extremities).  Mohs surgery is indicated for larger tumors, or tumors with aggressive histologic features, in these anatomic locations. Tumor Debulked?: curette Depth Of Tumor Invasion (For Histology): tumor not visualized Perineural Invasion (For Histology - Be Specific If Possible): absent Special Stains Stage 1 - Results: Base On Clearance Noted Above Stage 2: Additional Anesthesia Type: 1% lidocaine with epinephrine Staging Info: By selecting yes to the question above you will include information on AJCC 8 tumor staging in your Mohs note. Information on tumor staging will be automatically added for SCCs on the head and neck. AJCC 8 includes tumor size, tumor depth, perineural involvement and bone invasion. Tumor Depth: Less than 6mm from granular layer and no invasion beyond the subcutaneous fat Why Was The Change Made?: Please Select the Appropriate Response Medical Necessity Statement: Based on my medical judgement, Mohs surgery is the most appropriate treatment for this cancer compared to other treatments. Alternatives Discussed Intro (Do Not Add Period): I discussed alternative treatments to Mohs surgery and specifically discussed the risks and benefits of Consent 1/Introductory Paragraph: The rationale for Mohs was explained to the patient and consent was obtained. The risks, benefits and alternatives to therapy were discussed in detail. Specifically, the risks of infection, scarring, bleeding, prolonged wound healing, incomplete removal, allergy to anesthesia, nerve injury and recurrence were addressed. Prior to the procedure, the treatment site was clearly identified and confirmed by the patient. All components of Universal Protocol/PAUSE Rule completed. Consent 2/Introductory Paragraph: Mohs surgery was explained to the patient and consent was obtained. The risks, benefits and alternatives to therapy were discussed in detail. Specifically, the risks of infection, scarring, bleeding, prolonged wound healing, incomplete removal, allergy to anesthesia, nerve injury and recurrence were addressed. Prior to the procedure, the treatment site was clearly identified and confirmed by the patient. All components of Universal Protocol/PAUSE Rule completed. Consent 3/Introductory Paragraph: I gave the patient a chance to ask questions they had about the procedure.  Following this I explained the Mohs procedure and consent was obtained. The risks, benefits and alternatives to therapy were discussed in detail. Specifically, the risks of infection, scarring, bleeding, prolonged wound healing, incomplete removal, allergy to anesthesia, nerve injury and recurrence were addressed. Prior to the procedure, the treatment site was clearly identified and confirmed by the patient. All components of Universal Protocol/PAUSE Rule completed. Consent (Temporal Branch)/Introductory Paragraph: The rationale for Mohs was explained to the patient and consent was obtained. The risks, benefits and alternatives to therapy were discussed in detail. Specifically, the risks of damage to the temporal branch of the facial nerve, infection, scarring, bleeding, prolonged wound healing, incomplete removal, allergy to anesthesia, and recurrence were addressed. Prior to the procedure, the treatment site was clearly identified and confirmed by the patient. All components of Universal Protocol/PAUSE Rule completed. Consent (Marginal Mandibular)/Introductory Paragraph: The rationale for Mohs was explained to the patient and consent was obtained. The risks, benefits and alternatives to therapy were discussed in detail. Specifically, the risks of damage to the marginal mandibular branch of the facial nerve, infection, scarring, bleeding, prolonged wound healing, incomplete removal, allergy to anesthesia, and recurrence were addressed. Prior to the procedure, the treatment site was clearly identified and confirmed by the patient. All components of Universal Protocol/PAUSE Rule completed. Consent (Spinal Accessory)/Introductory Paragraph: The rationale for Mohs was explained to the patient and consent was obtained. The risks, benefits and alternatives to therapy were discussed in detail. Specifically, the risks of damage to the spinal accessory nerve, infection, scarring, bleeding, prolonged wound healing, incomplete removal, allergy to anesthesia, and recurrence were addressed. Prior to the procedure, the treatment site was clearly identified and confirmed by the patient. All components of Universal Protocol/PAUSE Rule completed. Consent (Near Eyelid Margin)/Introductory Paragraph: The rationale for Mohs was explained to the patient and consent was obtained. The risks, benefits and alternatives to therapy were discussed in detail. Specifically, the risks of ectropion or eyelid deformity, infection, scarring, bleeding, prolonged wound healing, incomplete removal, allergy to anesthesia, nerve injury and recurrence were addressed. Prior to the procedure, the treatment site was clearly identified and confirmed by the patient. All components of Universal Protocol/PAUSE Rule completed. Consent (Ear)/Introductory Paragraph: The rationale for Mohs was explained to the patient and consent was obtained. The risks, benefits and alternatives to therapy were discussed in detail. Specifically, the risks of ear deformity, infection, scarring, bleeding, prolonged wound healing, incomplete removal, allergy to anesthesia, nerve injury and recurrence were addressed. Prior to the procedure, the treatment site was clearly identified and confirmed by the patient. All components of Universal Protocol/PAUSE Rule completed. Consent (Nose)/Introductory Paragraph: The rationale for Mohs was explained to the patient and consent was obtained. The risks, benefits and alternatives to therapy were discussed in detail. Specifically, the risks of nasal deformity, changes in the flow of air through the nose, infection, scarring, bleeding, prolonged wound healing, incomplete removal, allergy to anesthesia, nerve injury and recurrence were addressed. Prior to the procedure, the treatment site was clearly identified and confirmed by the patient. All components of Universal Protocol/PAUSE Rule completed. Consent (Lip)/Introductory Paragraph: The rationale for Mohs was explained to the patient and consent was obtained. The risks, benefits and alternatives to therapy were discussed in detail. Specifically, the risks of lip deformity, changes in the oral aperture, infection, scarring, bleeding, prolonged wound healing, incomplete removal, allergy to anesthesia, nerve injury and recurrence were addressed. Prior to the procedure, the treatment site was clearly identified and confirmed by the patient. All components of Universal Protocol/PAUSE Rule completed. Consent (Scalp)/Introductory Paragraph: The rationale for Mohs was explained to the patient and consent was obtained. The risks, benefits and alternatives to therapy were discussed in detail. Specifically, the risks of changes in hair growth pattern secondary to repair, infection, scarring, bleeding, prolonged wound healing, incomplete removal, allergy to anesthesia, nerve injury and recurrence were addressed. Prior to the procedure, the treatment site was clearly identified and confirmed by the patient. All components of Universal Protocol/PAUSE Rule completed. Detail Level: Detailed Postop Diagnosis: same Surgeon: Brandon Manuel MD Anesthesia Volume In Cc: 5 Hemostasis: Electrocautery and electrocoagulation Estimated Blood Loss (Cc): minimal Repair Anesthesia Method: local infiltration Brow Lift Text: A midfrontal incision was made medially to the defect to allow access to the tissues just superior to the left eyebrow. Following careful dissection inferiorly in a supraperiosteal plane to the level of the left eyebrow, several 3-0 monocryl sutures were used to resuspend the eyebrow orbicularis oculi muscular unit to the superior frontal bone periosteum. This resulted in an appropriate reapproximation of static eyebrow symmetry and correction of the left brow ptosis. Epidermal Closure: none-secondary intention Suturegard Intro: Intraoperative tissue expansion was performed, utilizing the SUTUREGARD device, in order to reduce wound tension. Suturegard Body: The suture ends were repeatedly re-tightened and re-clamped to achieve the desired tissue expansion. Hemigard Intro: Due to skin fragility and wound tension, it was decided to use HEMIGARD adhesive retention suture devices to permit a linear closure. The skin was cleaned and dried for a 6cm distance away from the wound. Excessive hair, if present, was removed to allow for adhesion. Hemigard Postcare Instructions: The HEMIGARD strips are to remain completely dry for at least 5-7 days. Donor Site Anesthesia Type: same as repair anesthesia Graft Donor Site Dermal Sutures (Optional): 5-0 Vicryl Epidermal Closure Graft Donor Site (Optional): simple interrupted Graft Donor Site Bandage (Optional-Leave Blank If You Don't Want In Note): Telfa and a pressure bandage were applied to the donor site. Closure 2 Information: This tab is for additional flaps and grafts, including complex repair and grafts and complex repair and flaps. You can also specify a different location for the additional defect, if the location is the same you do not need to select a new one. We will insert the automated text for the repair you select below just as we do for solitary flaps and grafts. Please note that at this time if you select a location with a different insurance zone you will need to override the ICD10 and CPT if appropriate. Closure 3 Information: This tab is for additional flaps and grafts above and beyond our usual structured repairs.  Please note if you enter information here it will not currently bill and you will need to add the billing information manually. Wound Care: Petrolatum Dressing: dry sterile dressing Unna Boot Text: An Unna boot was placed to help immobilize the limb and facilitate more rapid healing. Home Suture Removal Text: Patient was provided instructions on removing sutures and will remove their sutures at home.  If they have any questions or difficulties they will call the office. Post-Care Instructions: I reviewed with the patient in detail post-care instructions. Patient is not to engage in any heavy lifting, exercise, or swimming for the next 14 days. Should the patient develop any fevers, chills, bleeding, severe pain patient will contact the office immediately. Pain Refusal Text: I offered to prescribe pain medication but the patient refused to take this medication. Mauc Instructions: By selecting yes to the question below the MAUC number will be added into the note.  This will be calculated automatically based on the diagnosis chosen, the size entered, the body zone selected (H,M,L) and the specific indications you chose. You will also have the option to override the Mohs AUC if you disagree with the automatically calculated number and this option is found in the Case Summary tab. Where Do You Want The Question To Include Opioid Counseling Located?: Case Summary Tab Eye Protection Verbiage: Before proceeding with the stage, a plastic scleral shield was inserted. The globe was anesthetized with a few drops of 1% lidocaine with 1:100,000 epinephrine. Then, an appropriate sized scleral shield was chosen and coated with lacrilube ointment. The shield was gently inserted and left in place for the duration of each stage. After the stage was completed, the shield was gently removed. Mohs Method Verbiage: An incision at a 45 degree angle following the standard Mohs approach was done and the specimen was harvested as a microscopic controlled layer. Surgeon/Pathologist Verbiage (Will Incorporate Name Of Surgeon From Intro If Not Blank): operated in two distinct and integrated capacities as the surgeon and pathologist. Mohs Histo Method Verbiage: Each section was then chromacoded and processed in the Mohs lab using the Mohs protocol and submitted for frozen section, utilizing hematoxylin and eosin histochemical stains. Subsequent Stages Histo Method Verbiage: Using a similar technique to that described above, a thin layer of tissue was removed from all areas where tumor was visible on the previous stage.  The tissue was again oriented, mapped, dyed, and processed as above. Mohs Rapid Report Verbiage: The area of clinically evident tumor was marked with skin marking ink and appropriately hatched.  The initial incision was made following the Mohs approach through the skin.  The specimen was taken to the lab, divided into the necessary number of pieces, chromacoded and processed according to the Mohs protocol.  This was repeated in successive stages until a tumor free defect was achieved. Complex Repair Preamble Text (Leave Blank If You Do Not Want): Extensive wide undermining was performed. Intermediate Repair Preamble Text (Leave Blank If You Do Not Want): Undermining was performed with blunt dissection. Graft Cartilage Fenestration Text: The cartilage was fenestrated with a 2mm punch biopsy to help facilitate graft survival and healing. Non-Graft Cartilage Fenestration Text: The cartilage was fenestrated with a 2mm punch biopsy to help facilitate healing. Secondary Intention Text (Leave Blank If You Do Not Want): The defect will heal with secondary intention. No Repair - Repaired With Adjacent Surgical Defect Text (Leave Blank If You Do Not Want): After obtaining clear surgical margins the defect was repaired concurrently with another surgical defect which was in close approximation. Adjacent Tissue Transfer Text: The defect edges were debeveled with a #15 scalpel blade. Given the location of the defect and the proximity to free margins an adjacent tissue transfer was deemed most appropriate. Using a sterile surgical marker, an appropriate flap was drawn incorporating the defect and placing the expected incisions within the relaxed skin tension lines where possible. The area thus outlined was incised deep to adipose tissue with a #15 scalpel blade. The skin margins were undermined to an appropriate distance in all directions utilizing iris scissors and carried over to close the primary defect. Advancement Flap (Single) Text: The defect edges were debeveled with a #15 scalpel blade. Given the location of the defect and the proximity to free margins a single advancement flap was deemed most appropriate. Using a sterile surgical marker, an appropriate advancement flap was drawn incorporating the defect and placing the expected incisions within the relaxed skin tension lines where possible. The area thus outlined was incised deep to adipose tissue with a #15 scalpel blade. The skin margins were undermined to an appropriate distance in all directions utilizing iris scissors. Following this, the designed flap was advanced and carried over into the primary defect and sutured into place. Advancement Flap (Double) Text: The defect edges were debeveled with a #15 scalpel blade. Given the location of the defect and the proximity to free margins a double advancement flap was deemed most appropriate. Using a sterile surgical marker, the appropriate advancement flaps were drawn incorporating the defect and placing the expected incisions within the relaxed skin tension lines where possible. The area thus outlined was incised deep to adipose tissue with a #15 scalpel blade. The skin margins were undermined to an appropriate distance in all directions utilizing iris scissors. Following this, the designed flaps were advanced and carried over into the primary defect and sutured into place. Advancement-Rotation Flap Text: The defect edges were debeveled with a #15 scalpel blade. Given the location of the defect, shape of the defect and the proximity to free margins an advancement-rotation flap was deemed most appropriate. Using a sterile surgical marker, an appropriate flap was drawn incorporating the defect and placing the expected incisions within the relaxed skin tension lines where possible. The area thus outlined was incised deep to adipose tissue with a #15 scalpel blade. The skin margins were undermined to an appropriate distance in all directions utilizing iris scissors. Following this, the designed flap was carried over into the primary defect and sutured into place. Alar Island Pedicle Flap Text: The defect edges were debeveled with a #15 scalpel blade. Given the location of the defect, shape of the defect and the proximity to the alar rim an island pedicle advancement flap was deemed most appropriate. Using a sterile surgical marker, an appropriate advancement flap was drawn incorporating the defect, outlining the appropriate donor tissue and placing the expected incisions within the nasal ala running parallel to the alar rim. The area thus outlined was incised with a #15 scalpel blade. The skin margins were undermined minimally to an appropriate distance in all directions around the primary defect and laterally outward around the island pedicle utilizing iris scissors.  There was minimal undermining beneath the pedicle flap. Following this, the designed flap was carried over into the primary defect and sutured into place. A-T Advancement Flap Text: The defect edges were debeveled with a #15 scalpel blade. Given the location of the defect, shape of the defect and the proximity to free margins an A-T advancement flap was deemed most appropriate. Using a sterile surgical marker, an appropriate advancement flap was drawn incorporating the defect and placing the expected incisions within the relaxed skin tension lines where possible. The area thus outlined was incised deep to adipose tissue with a #15 scalpel blade. The skin margins were undermined to an appropriate distance in all directions utilizing iris scissors. Following this, the designed flap was advanced and carried over into the primary defect and sutured into place. Banner Transposition Flap Text: The defect edges were debeveled with a #15 scalpel blade. Given the location of the defect and the proximity to free margins a Banner transposition flap was deemed most appropriate. Using a sterile surgical marker, an appropriate flap was drawn around the defect. The area thus outlined was incised deep to adipose tissue with a #15 scalpel blade. The skin margins were undermined to an appropriate distance in all directions utilizing iris scissors. Following this, the designed flap was carried into the primary defect and sutured into place. Bilateral Helical Rim Advancement Flap Text: The defect edges were debeveled with a #15 blade scalpel.  Given the location of the defect and the proximity to free margins (helical rim) a bilateral helical rim advancement flap was deemed most appropriate. Using a sterile surgical marker, the appropriate advancement flaps were drawn incorporating the defect and placing the expected incisions between the helical rim and antihelix where possible.  The area thus outlined was incised through and through with a #15 scalpel blade.  With a skin hook and iris scissors, the flaps were gently and sharply undermined and freed up. Following this, the designed flaps were placed into the primary defect and sutured into place. Bilateral Rotation Flap Text: The defect edges were debeveled with a #15 scalpel blade. Given the location of the defect, shape of the defect and the proximity to free margins a bilateral rotation flap was deemed most appropriate. Using a sterile surgical marker, an appropriate rotation flap was drawn incorporating the defect and placing the expected incisions within the relaxed skin tension lines where possible. The area thus outlined was incised deep to adipose tissue with a #15 scalpel blade. The skin margins were undermined to an appropriate distance in all directions utilizing iris scissors. Following this, the designed flap was carried over into the primary defect and sutured into place. Bilobed Flap Text: The defect edges were debeveled with a #15 scalpel blade. Given the location of the defect and the proximity to free margins a bilobe flap was deemed most appropriate. Using a sterile surgical marker, an appropriate bilobe flap drawn around the defect. The area thus outlined was incised deep to adipose tissue with a #15 scalpel blade. The skin margins were undermined to an appropriate distance in all directions utilizing iris scissors. Following this, the designed flap was carried over into the primary defect and sutured into place. Bilobed Transposition Flap Text: The defect edges were debeveled with a #15 scalpel blade. Given the location of the defect and the proximity to free margins a bilobed transposition flap was deemed most appropriate. Using a sterile surgical marker, an appropriate bilobe flap drawn around the defect. The area thus outlined was incised deep to adipose tissue with a #15 scalpel blade. The skin margins were undermined to an appropriate distance in all directions utilizing iris scissors. Following this, the designed flap was carried over into the primary defect and sutured into place. Bi-Rhombic Flap Text: The defect edges were debeveled with a #15 scalpel blade. Given the location of the defect and the proximity to free margins a bi-rhombic flap was deemed most appropriate. Using a sterile surgical marker, an appropriate rhombic flap was drawn incorporating the defect. The area thus outlined was incised deep to adipose tissue with a #15 scalpel blade. The skin margins were undermined to an appropriate distance in all directions utilizing iris scissors. Following this, the designed flap was carried over into the primary defect and sutured into place. Burow's Advancement Flap Text: The defect edges were debeveled with a #15 scalpel blade. Given the location of the defect and the proximity to free margins a Burow's advancement flap was deemed most appropriate. Using a sterile surgical marker, the appropriate advancement flap was drawn incorporating the defect and placing the expected incisions within the relaxed skin tension lines where possible. The area thus outlined was incised deep to adipose tissue with a #15 scalpel blade. The skin margins were undermined to an appropriate distance in all directions utilizing iris scissors. Following this, the designed flap was advanced and carried over into the primary defect and sutured into place. Chonodrocutaneous Helical Advancement Flap Text: The defect edges were debeveled with a #15 scalpel blade. Given the location of the defect and the proximity to free margins a chondrocutaneous helical advancement flap was deemed most appropriate. Using a sterile surgical marker, the appropriate advancement flap was drawn incorporating the defect and placing the expected incisions within the relaxed skin tension lines where possible. The area thus outlined was incised deep to adipose tissue with a #15 scalpel blade. The skin margins were undermined to an appropriate distance in all directions utilizing iris scissors. Following this, the designed flap was advanced and carried over into the primary defect and sutured into place. Crescentic Advancement Flap Text: The defect edges were debeveled with a #15 scalpel blade. Given the location of the defect and the proximity to free margins a crescentic advancement flap was deemed most appropriate. Using a sterile surgical marker, the appropriate advancement flap was drawn incorporating the defect and placing the expected incisions within the relaxed skin tension lines where possible. The area thus outlined was incised deep to adipose tissue with a #15 scalpel blade. The skin margins were undermined to an appropriate distance in all directions utilizing iris scissors. Following this, the designed flap was advanced and carried over into the primary defect and sutured into place. Dorsal Nasal Flap Text: The defect edges were debeveled with a #15 scalpel blade. Given the location of the defect and the proximity to free margins a dorsal nasal flap was deemed most appropriate. Using a sterile surgical marker, an appropriate dorsal nasal flap was drawn around the defect. The area thus outlined was incised deep to adipose tissue with a #15 scalpel blade. The skin margins were undermined to an appropriate distance in all directions utilizing iris scissors. Following this, the designed flap was carried into the primary defect and sutured into place. Double Island Pedicle Flap Text: The defect edges were debeveled with a #15 scalpel blade. Given the location of the defect, shape of the defect and the proximity to free margins a double island pedicle advancement flap was deemed most appropriate. Using a sterile surgical marker, an appropriate advancement flap was drawn incorporating the defect, outlining the appropriate donor tissue and placing the expected incisions within the relaxed skin tension lines where possible. The area thus outlined was incised deep to adipose tissue with a #15 scalpel blade. The skin margins were undermined to an appropriate distance in all directions around the primary defect and laterally outward around the island pedicle utilizing iris scissors.  There was minimal undermining beneath the pedicle flap. Following this, the flap was carried over into the primary defect and sutured into place. Double O-Z Flap Text: The defect edges were debeveled with a #15 scalpel blade. Given the location of the defect, shape of the defect and the proximity to free margins a Double O-Z flap was deemed most appropriate. Using a sterile surgical marker, an appropriate transposition flap was drawn incorporating the defect and placing the expected incisions within the relaxed skin tension lines where possible. The area thus outlined was incised deep to adipose tissue with a #15 scalpel blade. The skin margins were undermined to an appropriate distance in all directions utilizing iris scissors. Following this, the designed flap was carried over into the primary defect and sutured into place. Double O-Z Plasty Text: The defect edges were debeveled with a #15 scalpel blade. Given the location of the defect, shape of the defect and the proximity to free margins a Double O-Z plasty (double transposition flap) was deemed most appropriate. Using a sterile surgical marker, the appropriate transposition flaps were drawn incorporating the defect and placing the expected incisions within the relaxed skin tension lines where possible. The area thus outlined was incised deep to adipose tissue with a #15 scalpel blade. The skin margins were undermined to an appropriate distance in all directions utilizing iris scissors. Hemostasis was achieved with electrocautery. The flaps were then transposed and carried over into place, one clockwise and the other counterclockwise, and anchored with interrupted buried subcutaneous sutures. Double Z Plasty Text: The lesion was extirpated to the level of the fat with a #15 scalpel blade. Given the location of the defect, shape of the defect and the proximity to free margins a double Z-plasty was deemed most appropriate for repair. Using a sterile surgical marker, the appropriate transposition arms of the double Z-plasty were drawn incorporating the defect and placing the expected incisions within the relaxed skin tension lines where possible. The area thus outlined was incised deep to adipose tissue with a #15 scalpel blade. The skin margins were undermined to an appropriate distance in all directions utilizing iris scissors. The opposing transposition arms were then transposed and carried over into place in opposite direction and anchored with interrupted buried subcutaneous sutures. Ear Star Wedge Flap Text: The defect edges were debeveled with a #15 blade scalpel.  Given the location of the defect and the proximity to free margins (helical rim) an ear star wedge flap was deemed most appropriate. Using a sterile surgical marker, the appropriate flap was drawn incorporating the defect and placing the expected incisions between the helical rim and antihelix where possible.  The area thus outlined was incised through and through with a #15 scalpel blade. Following this, the designed flap was carried over into the primary defect and sutured into place. Flip-Flop Flap Text: The defect edges were debeveled with a #15 blade scalpel.  Given the location of the defect and the proximity to free margins a flip-flop flap was deemed most appropriate. Using a sterile surgical marker, the appropriate flap was drawn incorporating the defect and placing the expected incisions between the helical rim and antihelix where possible.  The area thus outlined was incised through and through with a #15 scalpel blade. Following this, the designed flap was carried over into the primary defect and sutured into place. Hatchet Flap Text: The defect edges were debeveled with a #15 scalpel blade. Given the location of the defect, shape of the defect and the proximity to free margins a hatchet flap was deemed most appropriate. Using a sterile surgical marker, an appropriate hatchet flap was drawn incorporating the defect and placing the expected incisions within the relaxed skin tension lines where possible. The area thus outlined was incised deep to adipose tissue with a #15 scalpel blade. The skin margins were undermined to an appropriate distance in all directions utilizing iris scissors. Following this, the designed flap was carried over into the primary defect and sutured into place. Helical Rim Advancement Flap Text: The defect edges were debeveled with a #15 blade scalpel.  Given the location of the defect and the proximity to free margins (helical rim) a double helical rim advancement flap was deemed most appropriate. Using a sterile surgical marker, the appropriate advancement flaps were drawn incorporating the defect and placing the expected incisions between the helical rim and antihelix where possible.  The area thus outlined was incised through and through with a #15 scalpel blade.  With a skin hook and iris scissors, the flaps were gently and sharply undermined and freed up. Folllowing this, the designed flaps were carried over into the primary defect and sutured into place. H Plasty Text: Given the location of the defect, shape of the defect and the proximity to free margins a H-plasty was deemed most appropriate for repair. Using a sterile surgical marker, the appropriate advancement arms of the H-plasty were drawn incorporating the defect and placing the expected incisions within the relaxed skin tension lines where possible. The area thus outlined was incised deep to adipose tissue with a #15 scalpel blade. The skin margins were undermined to an appropriate distance in all directions utilizing iris scissors.  The opposing advancement arms were then advanced and carried over into place in opposite direction and anchored with interrupted buried subcutaneous sutures. Island Pedicle Flap Text: The defect edges were debeveled with a #15 scalpel blade. Given the location of the defect, shape of the defect and the proximity to free margins an island pedicle advancement flap was deemed most appropriate. Using a sterile surgical marker, an appropriate advancement flap was drawn incorporating the defect, outlining the appropriate donor tissue and placing the expected incisions within the relaxed skin tension lines where possible. The area thus outlined was incised deep to adipose tissue with a #15 scalpel blade. The skin margins were undermined to an appropriate distance in all directions around the primary defect and laterally outward around the island pedicle utilizing iris scissors.  There was minimal undermining beneath the pedicle flap. Following this, the flap was carried over into the primary defect and sutured into place. Island Pedicle Flap With Canthal Suspension Text: The defect edges were debeveled with a #15 scalpel blade. Given the location of the defect, shape of the defect and the proximity to free margins an island pedicle advancement flap was deemed most appropriate. Using a sterile surgical marker, an appropriate advancement flap was drawn incorporating the defect, outlining the appropriate donor tissue and placing the expected incisions within the relaxed skin tension lines where possible. The area thus outlined was incised deep to adipose tissue with a #15 scalpel blade. The skin margins were undermined to an appropriate distance in all directions around the primary defect and laterally outward around the island pedicle utilizing iris scissors.  There was minimal undermining beneath the pedicle flap. A suspension suture was placed in the canthal tendon to prevent tension and prevent ectropion. Following this, the designed flap was placed into the primary defect and sutured into place. Island Pedicle Flap-Requiring Vessel Identification Text: The defect edges were debeveled with a #15 scalpel blade. Given the location of the defect, shape of the defect and the proximity to free margins an island pedicle advancement flap was deemed most appropriate. Using a sterile surgical marker, an appropriate advancement flap was drawn, based on the axial vessel mentioned above, incorporating the defect, outlining the appropriate donor tissue and placing the expected incisions within the relaxed skin tension lines where possible. The area thus outlined was incised deep to adipose tissue with a #15 scalpel blade. The skin margins were undermined to an appropriate distance in all directions around the primary defect and laterally outward around the island pedicle utilizing iris scissors.  There was minimal undermining beneath the pedicle flap. Following this, the designed flap was carried over into the primary defect and sutured into place. Keystone Flap Text: The defect edges were debeveled with a #15 scalpel blade. Given the location of the defect, shape of the defect a keystone flap was deemed most appropriate. Using a sterile surgical marker, an appropriate keystone flap was drawn incorporating the defect, outlining the appropriate donor tissue and placing the expected incisions within the relaxed skin tension lines where possible. The area thus outlined was incised deep to adipose tissue with a #15 scalpel blade. The skin margins were undermined to an appropriate distance in all directions around the primary defect and laterally outward around the flap utilizing iris scissors. Following this, the designed flap was carried into the primary defect and sutured into place. Melolabial Transposition Flap Text: The defect edges were debeveled with a #15 scalpel blade. Given the location of the defect and the proximity to free margins a melolabial flap was deemed most appropriate. Using a sterile surgical marker, an appropriate melolabial transposition flap was drawn incorporating the defect. The area thus outlined was incised deep to adipose tissue with a #15 scalpel blade. The skin margins were undermined to an appropriate distance in all directions utilizing iris scissors. Following this, the designed flap was carried over into the primary defect and sutured into place. Mercedes Flap Text: The defect edges were debeveled with a #15 scalpel blade. Given the location of the defect, shape of the defect and the proximity to free margins a Mercedes flap was deemed most appropriate. Using a sterile surgical marker, an appropriate advancement flap was drawn incorporating the defect and placing the expected incisions within the relaxed skin tension lines where possible. The area thus outlined was incised deep to adipose tissue with a #15 scalpel blade. The skin margins were undermined to an appropriate distance in all directions utilizing iris scissors. Following this, the designed flap was advanced and carried over into the primary defect and sutured into place. Modified Advancement Flap Text: The defect edges were debeveled with a #15 scalpel blade. Given the location of the defect, shape of the defect and the proximity to free margins a modified advancement flap was deemed most appropriate. Using a sterile surgical marker, an appropriate advancement flap was drawn incorporating the defect and placing the expected incisions within the relaxed skin tension lines where possible. The area thus outlined was incised deep to adipose tissue with a #15 scalpel blade. The skin margins were undermined to an appropriate distance in all directions utilizing iris scissors. Following this, the designed flap was advanced and carried over into the primary defect and sutured into place. Mucosal Advancement Flap Text: Given the location of the defect, shape of the defect and the proximity to free margins a mucosal advancement flap was deemed most appropriate. Incisions were made with a 15 blade scalpel in the appropriate fashion along the cutaneous vermilion border and the mucosal lip. The remaining actinically damaged mucosal tissue was excised.  The mucosal advancement flap was then elevated to the gingival sulcus with care taken to preserve the neurovascular structures and advanced into the primary defect. Care was taken to ensure that precise realignment of the vermilion border was achieved. Muscle Hinge Flap Text: The defect edges were debeveled with a #15 scalpel blade.  Given the size, depth and location of the defect and the proximity to free margins a muscle hinge flap was deemed most appropriate. Using a sterile surgical marker, an appropriate hinge flap was drawn incorporating the defect. The area thus outlined was incised with a #15 scalpel blade. The skin margins were undermined to an appropriate distance in all directions utilizing iris scissors. Following this, the designed flap was carried into the primary defect and sutured into place. Mustarde Flap Text: The defect edges were debeveled with a #15 scalpel blade.  Given the size, depth and location of the defect and the proximity to free margins a Mustarde flap was deemed most appropriate. Using a sterile surgical marker, an appropriate flap was drawn incorporating the defect. The area thus outlined was incised with a #15 scalpel blade. The skin margins were undermined to an appropriate distance in all directions utilizing iris scissors. Following this, the designed flap was carried into the primary defect and sutured into place. Nasal Turnover Hinge Flap Text: The defect edges were debeveled with a #15 scalpel blade.  Given the size, depth, location of the defect and the defect being full thickness a nasal turnover hinge flap was deemed most appropriate. Using a sterile surgical marker, an appropriate hinge flap was drawn incorporating the defect. The area thus outlined was incised with a #15 scalpel blade. The flap was designed to recreate the nasal mucosal lining and the alar rim. The skin margins were undermined to an appropriate distance in all directions utilizing iris scissors. Following this, the designed flap was carried over into the primary defect and sutured into place Nasalis-Muscle-Based Myocutaneous Island Pedicle Flap Text: Using a #15 blade, an incision was made around the donor flap to the level of the nasalis muscle. Wide lateral undermining was then performed in both the subcutaneous plane above the nasalis muscle, and in a submuscular plane just above periosteum. This allowed the formation of a free nasalis muscle axial pedicle (based on the angular artery) which was still attached to the actual cutaneous flap, increasing its mobility and vascular viability. Hemostasis was obtained with pinpoint electrocoagulation. The flap was mobilized into position and the pivotal anchor points positioned and stabilized with buried interrupted sutures. Subcutaneous and dermal tissues were closed in a multilayered fashion with sutures. Tissue redundancies were excised, and the epidermal edges were apposed without significant tension and sutured with sutures. Nasalis Myocutaneous Flap Text: Using a #15 blade, an incision was made around the donor flap to the level of the nasalis muscle. Wide lateral undermining was then performed in both the subcutaneous plane above the nasalis muscle, and in a submuscular plane just above periosteum. This allowed the formation of a free nasalis muscle axial pedicle which was still attached to the actual cutaneous flap, increasing its mobility and vascular viability. Hemostasis was obtained with pinpoint electrocoagulation. The flap was mobilized into position and the pivotal anchor points positioned and stabilized with buried interrupted sutures. Subcutaneous and dermal tissues were closed in a multilayered fashion with sutures. Tissue redundancies were excised, and the epidermal edges were apposed without significant tension and sutured with sutures. Nasolabial Transposition Flap Text: The defect edges were debeveled with a #15 scalpel blade.  Given the size, depth and location of the defect and the proximity to free margins a nasolabial transposition flap was deemed most appropriate. Using a sterile surgical marker, an appropriate flap was drawn incorporating the defect. The area thus outlined was incised with a #15 scalpel blade. The skin margins were undermined to an appropriate distance in all directions utilizing iris scissors. Following this, the designed flap was carried into the primary defect and sutured into place. Orbicularis Oris Muscle Flap Text: The defect edges were debeveled with a #15 scalpel blade.  Given that the defect affected the competency of the oral sphincter an orbicularis oris muscle flap was deemed most appropriate to restore this competency and normal muscle function.  Using a sterile surgical marker, an appropriate flap was drawn incorporating the defect. The area thus outlined was incised with a #15 scalpel blade. Following this, the designed flap was carried over into the primary defect and sutured into place. O-T Advancement Flap Text: The defect edges were debeveled with a #15 scalpel blade. Given the location of the defect, shape of the defect and the proximity to free margins an O-T advancement flap was deemed most appropriate. Using a sterile surgical marker, an appropriate advancement flap was drawn incorporating the defect and placing the expected incisions within the relaxed skin tension lines where possible. The area thus outlined was incised deep to adipose tissue with a #15 scalpel blade. The skin margins were undermined to an appropriate distance in all directions utilizing iris scissors. Following this, the designed flap was advanced and carried over into the primary defect and sutured into place. O-T Plasty Text: The defect edges were debeveled with a #15 scalpel blade. Given the location of the defect, shape of the defect and the proximity to free margins an O-T plasty was deemed most appropriate. Using a sterile surgical marker, an appropriate O-T plasty was drawn incorporating the defect and placing the expected incisions within the relaxed skin tension lines where possible. The area thus outlined was incised deep to adipose tissue with a #15 scalpel blade. The skin margins were undermined to an appropriate distance in all directions utilizing iris scissors. Following this, the designed flap was carried over into the primary defect and sutured into place. O-L Flap Text: The defect edges were debeveled with a #15 scalpel blade. Given the location of the defect, shape of the defect and the proximity to free margins an O-L flap was deemed most appropriate. Using a sterile surgical marker, an appropriate advancement flap was drawn incorporating the defect and placing the expected incisions within the relaxed skin tension lines where possible. The area thus outlined was incised deep to adipose tissue with a #15 scalpel blade. The skin margins were undermined to an appropriate distance in all directions utilizing iris scissors. Following this, the designed flap was advanced and carried over into the primary defect and sutured into place. O-Z Flap Text: The defect edges were debeveled with a #15 scalpel blade. Given the location of the defect, shape of the defect and the proximity to free margins an O-Z flap was deemed most appropriate. Using a sterile surgical marker, an appropriate transposition flap was drawn incorporating the defect and placing the expected incisions within the relaxed skin tension lines where possible. The area thus outlined was incised deep to adipose tissue with a #15 scalpel blade. The skin margins were undermined to an appropriate distance in all directions utilizing iris scissors. Following this, the designed flap was carried over into the primary defect and sutured into place. O-Z Plasty Text: The defect edges were debeveled with a #15 scalpel blade. Given the location of the defect, shape of the defect and the proximity to free margins an O-Z plasty (double transposition flap) was deemed most appropriate. Using a sterile surgical marker, the appropriate transposition flaps were drawn incorporating the defect and placing the expected incisions within the relaxed skin tension lines where possible. The area thus outlined was incised deep to adipose tissue with a #15 scalpel blade. The skin margins were undermined to an appropriate distance in all directions utilizing iris scissors. Hemostasis was achieved with electrocautery. The flaps were then transposed and carried over into place, one clockwise and the other counterclockwise, and anchored with interrupted buried subcutaneous sutures. Peng Advancement Flap Text: The defect edges were debeveled with a #15 scalpel blade. Given the location of the defect, shape of the defect and the proximity to free margins a Peng advancement flap was deemed most appropriate. Using a sterile surgical marker, an appropriate advancement flap was drawn incorporating the defect and placing the expected incisions within the relaxed skin tension lines where possible. The area thus outlined was incised deep to adipose tissue with a #15 scalpel blade. The skin margins were undermined to an appropriate distance in all directions utilizing iris scissors. Following this, the designed flap was advanced and carried over into the primary defect and sutured into place. Rectangular Flap Text: The defect edges were debeveled with a #15 scalpel blade. Given the location of the defect and the proximity to free margins a rectangular flap was deemed most appropriate. Using a sterile surgical marker, an appropriate rectangular flap was drawn incorporating the defect. The area thus outlined was incised deep to adipose tissue with a #15 scalpel blade. The skin margins were undermined to an appropriate distance in all directions utilizing iris scissors. Following this, the designed flap was carried over into the primary defect and sutured into place. Rhombic Flap Text: The defect edges were debeveled with a #15 scalpel blade. Given the location of the defect and the proximity to free margins a rhombic flap was deemed most appropriate. Using a sterile surgical marker, an appropriate rhombic flap was drawn incorporating the defect. The area thus outlined was incised deep to adipose tissue with a #15 scalpel blade. The skin margins were undermined to an appropriate distance in all directions utilizing iris scissors. Following this, the designed flap was carried over into the primary defect and sutured into place. Rhomboid Transposition Flap Text: The defect edges were debeveled with a #15 scalpel blade. Given the location of the defect and the proximity to free margins a rhomboid transposition flap was deemed most appropriate. Using a sterile surgical marker, an appropriate rhomboid flap was drawn incorporating the defect. The area thus outlined was incised deep to adipose tissue with a #15 scalpel blade. The skin margins were undermined to an appropriate distance in all directions utilizing iris scissors. Following this, the designed flap was carried over into the primary defect and sutured into place. Rotation Flap Text: The defect edges were debeveled with a #15 scalpel blade. Given the location of the defect, shape of the defect and the proximity to free margins a rotation flap was deemed most appropriate. Using a sterile surgical marker, an appropriate rotation flap was drawn incorporating the defect and placing the expected incisions within the relaxed skin tension lines where possible. The area thus outlined was incised deep to adipose tissue with a #15 scalpel blade. The skin margins were undermined to an appropriate distance in all directions utilizing iris scissors. Following this, the designed flap was carried over into the primary defect and sutured into place. Spiral Flap Text: The defect edges were debeveled with a #15 scalpel blade. Given the location of the defect, shape of the defect and the proximity to free margins a spiral flap was deemed most appropriate. Using a sterile surgical marker, an appropriate rotation flap was drawn incorporating the defect and placing the expected incisions within the relaxed skin tension lines where possible. The area thus outlined was incised deep to adipose tissue with a #15 scalpel blade. The skin margins were undermined to an appropriate distance in all directions utilizing iris scissors. Following this, the designed flap was carried over into the primary defect and sutured into place. Staged Advancement Flap Text: The defect edges were debeveled with a #15 scalpel blade. Given the location of the defect, shape of the defect and the proximity to free margins a staged advancement flap was deemed most appropriate. Using a sterile surgical marker, an appropriate advancement flap was drawn incorporating the defect and placing the expected incisions within the relaxed skin tension lines where possible. The area thus outlined was incised deep to adipose tissue with a #15 scalpel blade. The skin margins were undermined to an appropriate distance in all directions utilizing iris scissors. Following this, the designed flap was carried over into the primary defect and sutured into place. Star Wedge Flap Text: The defect edges were debeveled with a #15 scalpel blade. Given the location of the defect, shape of the defect and the proximity to free margins a star wedge flap was deemed most appropriate. Using a sterile surgical marker, an appropriate rotation flap was drawn incorporating the defect and placing the expected incisions within the relaxed skin tension lines where possible. The area thus outlined was incised deep to adipose tissue with a #15 scalpel blade. The skin margins were undermined to an appropriate distance in all directions utilizing iris scissors. Following this, the designed flap was carried over into the primary defect and sutured into place. Transposition Flap Text: The defect edges were debeveled with a #15 scalpel blade. Given the location of the defect and the proximity to free margins a transposition flap was deemed most appropriate. Using a sterile surgical marker, an appropriate transposition flap was drawn incorporating the defect. The area thus outlined was incised deep to adipose tissue with a #15 scalpel blade. The skin margins were undermined to an appropriate distance in all directions utilizing iris scissors. Following this, the designed flap was carried over into the primary defect and sutured into place. Trilobed Flap Text: The defect edges were debeveled with a #15 scalpel blade. Given the location of the defect and the proximity to free margins a trilobed flap was deemed most appropriate. Using a sterile surgical marker, an appropriate trilobed flap was drawn around the defect. The area thus outlined was incised deep to adipose tissue with a #15 scalpel blade. The skin margins were undermined to an appropriate distance in all directions utilizing iris scissors. Following this, the designed flap was carried into the primary defect and sutured into place. V-Y Flap Text: The defect edges were debeveled with a #15 scalpel blade. Given the location of the defect, shape of the defect and the proximity to free margins a V-Y flap was deemed most appropriate. Using a sterile surgical marker, an appropriate advancement flap was drawn incorporating the defect and placing the expected incisions within the relaxed skin tension lines where possible. The area thus outlined was incised deep to adipose tissue with a #15 scalpel blade. The skin margins were undermined to an appropriate distance in all directions utilizing iris scissors. Following this, the designed flap was advanced and carried over into the primary defect and sutured into place. V-Y Plasty Text: The defect edges were debeveled with a #15 scalpel blade. Given the location of the defect, shape of the defect and the proximity to free margins an V-Y advancement flap was deemed most appropriate. Using a sterile surgical marker, an appropriate advancement flap was drawn incorporating the defect and placing the expected incisions within the relaxed skin tension lines where possible. The area thus outlined was incised deep to adipose tissue with a #15 scalpel blade. The skin margins were undermined to an appropriate distance in all directions utilizing iris scissors. Following this, the designed flap was advanced and carried over into the primary defect and sutured into place. W Plasty Text: The lesion was extirpated to the level of the fat with a #15 scalpel blade. Given the location of the defect, shape of the defect and the proximity to free margins a W-plasty was deemed most appropriate for repair. Using a sterile surgical marker, the appropriate transposition arms of the W-plasty were drawn incorporating the defect and placing the expected incisions within the relaxed skin tension lines where possible. The area thus outlined was incised deep to adipose tissue with a #15 scalpel blade. The skin margins were undermined to an appropriate distance in all directions utilizing iris scissors. The opposing transposition arms were then transposed and carried over into place in opposite direction and anchored with interrupted buried subcutaneous sutures. Z Plasty Text: The lesion was extirpated to the level of the fat with a #15 scalpel blade. Given the location of the defect, shape of the defect and the proximity to free margins a Z-plasty was deemed most appropriate for repair. Using a sterile surgical marker, the appropriate transposition arms of the Z-plasty were drawn incorporating the defect and placing the expected incisions within the relaxed skin tension lines where possible. The area thus outlined was incised deep to adipose tissue with a #15 scalpel blade. The skin margins were undermined to an appropriate distance in all directions utilizing iris scissors. The opposing transposition arms were then transposed and carried over into place in opposite direction and anchored with interrupted buried subcutaneous sutures. Zygomaticofacial Flap Text: Given the location of the defect, shape of the defect and the proximity to free margins a zygomaticofacial flap was deemed most appropriate for repair. Using a sterile surgical marker, the appropriate flap was drawn incorporating the defect and placing the expected incisions within the relaxed skin tension lines where possible. The area thus outlined was incised deep to adipose tissue with a #15 scalpel blade with preservation of a vascular pedicle.  The skin margins were undermined to an appropriate distance in all directions utilizing iris scissors. The flap was then carried over into the defect and anchored with interrupted buried subcutaneous sutures. Abbe Flap (Lower To Upper Lip) Text: The defect of the upper lip was assessed and measured.  Given the location and size of the defect, an Abbe flap was deemed most appropriate. Using a sterile surgical marker, an appropriate Abbe flap was measured and drawn on the lower lip. Local anesthesia was then infiltrated. A scalpel was then used to incise the upper lip through and through the skin, vermilion, muscle and mucosa, leaving the flap pedicled on the opposite side.  The flap was then rotated and transferred to the lower lip defect.  The flap was then sutured into place with a three layer technique, closing the orbicularis oris muscle layer with subcutaneous buried sutures, followed by a mucosal layer and an epidermal layer. Abbe Flap (Upper To Lower Lip) Text: The defect of the lower lip was assessed and measured.  Given the location and size of the defect, an Abbe flap was deemed most appropriate. Using a sterile surgical marker, an appropriate Abbe flap was measured and drawn on the upper lip. Local anesthesia was then infiltrated.  A scalpel was then used to incise the upper lip through and through the skin, vermilion, muscle and mucosa, leaving the flap pedicled on the opposite side.  The flap was then rotated and transferred to the lower lip defect.  The flap was then sutured into place with a three layer technique, closing the orbicularis oris muscle layer with subcutaneous buried sutures, followed by a mucosal layer and an epidermal layer. Cheek Interpolation Flap Text: A decision was made to reconstruct the defect utilizing an interpolation axial flap and a staged reconstruction.  A telfa template was made of the defect.  This telfa template was then used to outline the Cheek Interpolation flap.  The donor area for the pedicle flap was then injected with anesthesia.  The flap was excised through the skin and subcutaneous tissue down to the layer of the underlying musculature.  The interpolation flap was carefully excised within this deep plane to maintain its blood supply.  The edges of the donor site were undermined.   The donor site was closed in a primary fashion.  The pedicle was then rotated into position and sutured.  Once the tube was sutured into place, adequate blood supply was confirmed with blanching and refill.  The pedicle was then wrapped with xeroform gauze and dressed appropriately with a telfa and gauze bandage to ensure continued blood supply and protect the attached pedicle. Cheek-To-Nose Interpolation Flap Text: A decision was made to reconstruct the defect utilizing an interpolation axial flap and a staged reconstruction.  A telfa template was made of the defect.  This telfa template was then used to outline the Cheek-To-Nose Interpolation flap.  The donor area for the pedicle flap was then injected with anesthesia.  The flap was excised through the skin and subcutaneous tissue down to the layer of the underlying musculature.  The interpolation flap was carefully excised within this deep plane to maintain its blood supply.  The edges of the donor site were undermined.   The donor site was closed in a primary fashion.  The pedicle was then rotated into position and sutured.  Once the tube was sutured into place, adequate blood supply was confirmed with blanching and refill.  The pedicle was then wrapped with xeroform gauze and dressed appropriately with a telfa and gauze bandage to ensure continued blood supply and protect the attached pedicle. Estlander Flap (Lower To Upper Lip) Text: The defect of the lower lip was assessed and measured.  Given the location and size of the defect, an Estlander flap was deemed most appropriate. Using a sterile surgical marker, an appropriate Estlander flap was measured and drawn on the upper lip. Local anesthesia was then infiltrated. A scalpel was then used to incise the lateral aspect of the flap, through skin, muscle and mucosa, leaving the flap pedicled medially.  The flap was then rotated and positioned to fill the lower lip defect.  The flap was then sutured into place with a three layer technique, closing the orbicularis oris muscle layer with subcutaneous buried sutures, followed by a mucosal layer and an epidermal layer. Interpolation Flap Text: A decision was made to reconstruct the defect utilizing an interpolation axial flap and a staged reconstruction.  A telfa template was made of the defect.  This telfa template was then used to outline the interpolation flap.  The donor area for the pedicle flap was then injected with anesthesia.  The flap was excised through the skin and subcutaneous tissue down to the layer of the underlying musculature.  The interpolation flap was carefully excised within this deep plane to maintain its blood supply.  The edges of the donor site were undermined.   The donor site was closed in a primary fashion.  The pedicle was then rotated into position and sutured.  Once the tube was sutured into place, adequate blood supply was confirmed with blanching and refill.  The pedicle was then wrapped with xeroform gauze and dressed appropriately with a telfa and gauze bandage to ensure continued blood supply and protect the attached pedicle. Melolabial Interpolation Flap Text: A decision was made to reconstruct the defect utilizing an interpolation axial flap and a staged reconstruction.  A telfa template was made of the defect.  This telfa template was then used to outline the melolabial interpolation flap.  The donor area for the pedicle flap was then injected with anesthesia.  The flap was excised through the skin and subcutaneous tissue down to the layer of the underlying musculature.  The pedicle flap was carefully excised within this deep plane to maintain its blood supply.  The edges of the donor site were undermined.   The donor site was closed in a primary fashion.  The pedicle was then rotated into position and sutured.  Once the tube was sutured into place, adequate blood supply was confirmed with blanching and refill.  The pedicle was then wrapped with xeroform gauze and dressed appropriately with a telfa and gauze bandage to ensure continued blood supply and protect the attached pedicle. Mastoid Interpolation Flap Text: A decision was made to reconstruct the defect utilizing an interpolation axial flap and a staged reconstruction.  A telfa template was made of the defect.  This telfa template was then used to outline the mastoid interpolation flap.  The donor area for the pedicle flap was then injected with anesthesia.  The flap was excised through the skin and subcutaneous tissue down to the layer of the underlying musculature.  The pedicle flap was carefully excised within this deep plane to maintain its blood supply.  The edges of the donor site were undermined.   The donor site was closed in a primary fashion.  The pedicle was then rotated into position and sutured.  Once the tube was sutured into place, adequate blood supply was confirmed with blanching and refill.  The pedicle was then wrapped with xeroform gauze and dressed appropriately with a telfa and gauze bandage to ensure continued blood supply and protect the attached pedicle. Paramedian Forehead Flap Text: A decision was made to reconstruct the defect utilizing an interpolation axial flap and a staged reconstruction.  A telfa template was made of the defect.  This telfa template was then used to outline the paramedian forehead pedicle flap.  The donor area for the pedicle flap was then injected with anesthesia.  The flap was excised through the skin and subcutaneous tissue down to the layer of the underlying musculature.  The pedicle flap was carefully excised within this deep plane to maintain its blood supply.  The edges of the donor site were undermined.   The donor site was closed in a primary fashion.  The pedicle was then rotated into position and sutured.  Once the tube was sutured into place, adequate blood supply was confirmed with blanching and refill.  The pedicle was then wrapped with xeroform gauze and dressed appropriately with a telfa and gauze bandage to ensure continued blood supply and protect the attached pedicle. Posterior Auricular Interpolation Flap Text: A decision was made to reconstruct the defect utilizing an interpolation axial flap and a staged reconstruction.  A telfa template was made of the defect.  This telfa template was then used to outline the posterior auricular interpolation flap.  The donor area for the pedicle flap was then injected with anesthesia.  The flap was excised through the skin and subcutaneous tissue down to the layer of the underlying musculature.  The pedicle flap was carefully excised within this deep plane to maintain its blood supply.  The edges of the donor site were undermined.   The donor site was closed in a primary fashion.  The pedicle was then rotated into position and sutured.  Once the tube was sutured into place, adequate blood supply was confirmed with blanching and refill.  The pedicle was then wrapped with xeroform gauze and dressed appropriately with a telfa and gauze bandage to ensure continued blood supply and protect the attached pedicle. Cheiloplasty (Complex) Text: A decision was made to reconstruct the defect with a  cheiloplasty.  The defect was undermined extensively.  Additional orbicularis oris muscle was excised with a 15 blade scalpel.  The defect was converted into a full thickness wedge to facilite a better cosmetic result.  Small vessels were then tied off with 5-0 monocyrl. The orbicularis oris, superficial fascia, adipose and dermis were then reapproximated.  After the deeper layers were approximated the epidermis was reapproximated with particular care given to realign the vermilion border. Cheiloplasty (Less Than 50%) Text: A decision was made to reconstruct the defect with a  cheiloplasty.  The defect was undermined extensively.  Additional orbicularis oris muscle was excised with a 15 blade scalpel.  The defect was converted into a full thickness wedge, of less than 50% of the vertical height of the lip, to facilite a better cosmetic result.  Small vessels were then tied off with 5-0 monocyrl. The orbicularis oris, superficial fascia, adipose and dermis were then reapproximated.  After the deeper layers were approximated the epidermis was reapproximated with particular care given to realign the vermilion border. Ear Wedge Repair Text: A wedge excision was completed by carrying down an excision through the full thickness of the ear and cartilage with an inward facing Burow's triangle. The wound was then closed in a layered fashion. Full Thickness Lip Wedge Repair (Flap) Text: Given the location of the defect and the proximity to free margins a full thickness wedge repair was deemed most appropriate. Using a sterile surgical marker, the appropriate repair was drawn incorporating the defect and placing the expected incisions perpendicular to the vermilion border.  The vermilion border was also meticulously outlined to ensure appropriate reapproximation during the repair.  The area thus outlined was incised through and through with a #15 scalpel blade.  The muscularis and dermis were reaproximated with deep sutures following hemostasis. Care was taken to realign the vermilion border before proceeding with the superficial closure.  Once the vermilion was realigned the superfical and mucosal closure was finished. Burow's Graft Text: The defect edges were debeveled with a #15 scalpel blade. Given the location of the defect, shape of the defect, the proximity to free margins and the presence of a standing cone deformity a Burow's skin graft was deemed most appropriate. The standing cone was removed and this tissue was then trimmed to the shape of the primary defect. The adipose tissue was also removed until only dermis and epidermis were left.  The skin margins of the secondary defect were undermined to an appropriate distance in all directions utilizing iris scissors.  The secondary defect was closed with interrupted buried subcutaneous sutures.  The skin edges were then re-apposed with running  sutures.  The skin graft was then placed in the primary defect and oriented appropriately. Cartilage Graft Text: The defect edges were debeveled with a #15 scalpel blade. Given the location of the defect, shape of the defect, the fact the defect involved a full thickness cartilage defect a cartilage graft was deemed most appropriate.  An appropriate donor site was identified, cleansed, and anesthetized. The cartilage graft was then harvested and transferred to the recipient site, oriented appropriately and then sutured into place.  The secondary defect was then repaired using a primary closure. Composite Graft Text: The defect edges were debeveled with a #15 scalpel blade. Given the location of the defect, shape of the defect, the proximity to free margins and the fact the defect was full thickness a composite graft was deemed most appropriate.  The defect was outline and then transferred to the donor site.  A full thickness graft was then excised from the donor site. The graft was then placed in the primary defect, oriented appropriately and then sutured into place.  The secondary defect was then repaired using a primary closure. Epidermal Autograft Text: The defect edges were debeveled with a #15 scalpel blade. Given the location of the defect, shape of the defect and the proximity to free margins an epidermal autograft was deemed most appropriate. Using a sterile surgical marker, the primary defect shape was transferred to the donor site. The epidermal graft was then harvested.  The skin graft was then placed in the primary defect and oriented appropriately. Dermal Autograft Text: The defect edges were debeveled with a #15 scalpel blade. Given the location of the defect, shape of the defect and the proximity to free margins a dermal autograft was deemed most appropriate. Using a sterile surgical marker, the primary defect shape was transferred to the donor site. The area thus outlined was incised deep to adipose tissue with a #15 scalpel blade.  The harvested graft was then trimmed of adipose and epidermal tissue until only dermis was left.  The skin graft was then placed in the primary defect and oriented appropriately. Ftsg Text: The defect edges were debeveled with a #15 scalpel blade. Given the location of the defect, shape of the defect and the proximity to free margins a full thickness skin graft was deemed most appropriate. Using a sterile surgical marker, the primary defect shape was transferred to the donor site. The area thus outlined was incised deep to adipose tissue with a #15 scalpel blade.  The harvested graft was then trimmed of adipose tissue until only dermis and epidermis was left.  The skin margins of the secondary defect were undermined to an appropriate distance in all directions utilizing iris scissors.  The secondary defect was closed with interrupted buried subcutaneous sutures.  The skin edges were then re-apposed with running  sutures.  The skin graft was then placed in the primary defect and oriented appropriately. Pinch Graft Text: The defect edges were debeveled with a #15 scalpel blade. Given the location of the defect, shape of the defect and the proximity to free margins a pinch graft was deemed most appropriate. Using a sterile surgical marker, the primary defect shape was transferred to the donor site. The area thus outlined was incised deep to adipose tissue with a #15 scalpel blade.  The harvested graft was then trimmed of adipose tissue until only dermis and epidermis was left. The skin margins of the secondary defect were undermined to an appropriate distance in all directions utilizing iris scissors.  The secondary defect was closed with interrupted buried subcutaneous sutures.  The skin edges were then re-apposed with running  sutures.  The skin graft was then placed in the primary defect and oriented appropriately. Skin Substitute Text: The defect edges were debeveled with a #15 scalpel blade. Given the location of the defect, shape of the defect and the proximity to free margins a skin substitute graft was deemed most appropriate.  The graft material was trimmed to fit the size of the defect. The graft was then placed in the primary defect and oriented appropriately. Split-Thickness Skin Graft Text: The defect edges were debeveled with a #15 scalpel blade. Given the location of the defect, shape of the defect and the proximity to free margins a split thickness skin graft was deemed most appropriate. Using a sterile surgical marker, the primary defect shape was transferred to the donor site. The split thickness graft was then harvested.  The skin graft was then placed in the primary defect and oriented appropriately. Tissue Cultured Epidermal Autograft Text: The defect edges were debeveled with a #15 scalpel blade. Given the location of the defect, shape of the defect and the proximity to free margins a tissue cultured epidermal autograft was deemed most appropriate.  The graft was then trimmed to fit the size of the defect.  The graft was then placed in the primary defect and oriented appropriately. Xenograft Text: The defect edges were debeveled with a #15 scalpel blade. Given the location of the defect, shape of the defect and the proximity to free margins a xenograft was deemed most appropriate.  The graft was then trimmed to fit the size of the defect.  The graft was then placed in the primary defect and oriented appropriately. Complex Repair And Flap Additional Text (Will Appearing After The Standard Complex Repair Text): The complex repair was not sufficient to completely close the primary defect. The remaining additional defect was repaired with the flap mentioned below. Complex Repair And Graft Additional Text (Will Appearing After The Standard Complex Repair Text): The complex repair was not sufficient to completely close the primary defect. The remaining additional defect was repaired with the graft mentioned below. Eyelid Full Thickness Repair - 27651: The eyelid defect was full thickness which required a wedge repair of the eyelid. Special care was taken to ensure that the eyelid margin was realligned when placing sutures. Eyelid Partial Thickness Repair - 45025: The eyelid defect was partial thickness which required a wedge repair of the eyelid. Special care was taken to ensure that the eyelid margin was realligned when placing sutures. Intermediate Repair And Flap Additional Text (Will Appearing After The Standard Complex Repair Text): The intermediate repair was not sufficient to completely close the primary defect. The remaining additional defect was repaired with the flap mentioned below. Intermediate Repair And Graft Additional Text (Will Appearing After The Standard Complex Repair Text): The intermediate repair was not sufficient to completely close the primary defect. The remaining additional defect was repaired with the graft mentioned below. Localized Dermabrasion With 15 Blade Text: The patient was draped in routine manner.  Localized dermabrasion using a 15 blade was performed in routine manner to papillary dermis. This spot dermabrasion is being performed to complete skin cancer reconstruction. It also will eliminate the other sun damaged precancerous cells that are known to be part of the regional effect of a lifetime's worth of sun exposure. This localized dermabrasion is therapeutic and should not be considered cosmetic in any regard. Localized Dermabrasion With Sand Papertext: The patient was draped in routine manner.  Localized dermabrasion using sterile sand paper was performed in routine manner to papillary dermis. This spot dermabrasion is being performed to complete skin cancer reconstruction. It also will eliminate the other sun damaged precancerous cells that are known to be part of the regional effect of a lifetime's worth of sun exposure. This localized dermabrasion is therapeutic and should not be considered cosmetic in any regard. Localized Dermabrasion With Wire Brush Text: The patient was draped in routine manner.  Localized dermabrasion using 3 x 17 mm wire brush was performed in routine manner to papillary dermis. This spot dermabrasion is being performed to complete skin cancer reconstruction. It also will eliminate the other sun damaged precancerous cells that are known to be part of the regional effect of a lifetime's worth of sun exposure. This localized dermabrasion is therapeutic and should not be considered cosmetic in any regard. Purse String (Simple) Text: Given the location of the defect and the characteristics of the surrounding skin a purse string closure was deemed most appropriate.  Undermining was performed circumferentially around the surgical defect.  A purse string suture was then placed and tightened. Purse String (Intermediate) Text: Given the location of the defect and the characteristics of the surrounding skin a purse string intermediate closure was deemed most appropriate.  Undermining was performed circumferentially around the surgical defect.  A purse string suture was then placed and tightened. Partial Purse String (Simple) Text: Given the location of the defect and the characteristics of the surrounding skin a simple purse string closure was deemed most appropriate.  Undermining was performed circumferentially around the surgical defect.  A purse string suture was then placed and tightened. Wound tension only allowed a partial closure of the circular defect. Partial Purse String (Intermediate) Text: Given the location of the defect and the characteristics of the surrounding skin an intermediate purse string closure was deemed most appropriate.  Undermining was performed circumferentially around the surgical defect.  A purse string suture was then placed and tightened. Wound tension only allowed a partial closure of the circular defect. Tarsorrhaphy Text: A tarsorrhaphy was performed using Frost sutures. Manual Repair Warning Statement: We plan on removing the manually selected variable below in favor of our much easier automatic structured text blocks found in the previous tab. We decided to do this to help make the flow better and give you the full power of structured data. Manual selection is never going to be ideal in our platform and I would encourage you to avoid using manual selection from this point on, especially since I will be sunsetting this feature. It is important that you do one of two things with the customized text below. First, you can save all of the text in a word file so you can have it for future reference. Second, transfer the text to the appropriate area in the Library tab. Lastly, if there is a flap or graft type which we do not have you need to let us know right away so I can add it in before the variable is hidden. No need to panic, we plan to give you roughly 6 months to make the change. Same Histology In Subsequent Stages Text: The pattern and morphology of the tumor is as described in the first stage. No Residual Tumor Seen Histology Text: No persisting tumor was seen upon examination. Since the skin cancer was completely excised in Mohs surgery, there was no malignancy seen at the margin for which the histology can be described; only normal skin was seen, including the normal components of hair follicles, sebaceous glands, epidermis, dermis, and subcutaneous tissue. Inflammation Suggestive Of Cancer Camouflage Histology Text: There was a dense lymphocytic infiltrate which prevented adequate histologic evaluation of adjacent structures. Bcc Histology Text: There were numerous aggregates of basaloid cells. Bcc Infiltrative Histology Text: Full thickness epidermis is visualized around the specimen. Atypical basaloid cells with hyper-chromatic nuclei and mitotic figures are present in small infiltrative nests, extending into or beyond the papillary-reticular dermal interface, as indicated on the mohs map, consistent with aggressive basal cell carcinoma invasive to the deep dermis. Bcc Infundibulocystic Histology Text: : Bcc  Nodular Histology Text: Full thickness epidermis is visualized around the entire specimen. Normal hair follicles and sebaceous glands are present. In the dermis there are atypical basaloid cells in nests invading the papillary dermis, extending into or beyond the papillary- reticular dermal interface, consistent with basal cell carcinoma, as indicated on the mohs map. Bcc Superficial Histology Text: Full thickness epidermis is visualized around the entire specimen. Atypical basaloid nests are present emanating from the follicular epithelium, as indicated on the Mohs map, invasive to the mid dermis consistent with superficial follicular basal cell carcinoma. Scc Histology Text: Full thickness epidermis is visualized around the specimen. Neoplasticism epithelium with prominent squamatization extending into the dermis and into or beyond the [papillary- reticular dermal interface is present, as indicated in the Mohs map, consistent with squamous cell carcinoma. Scc In Situ Histology Text: Full thickness epidermis is visualized around the entire specimen. Normal hair follicles ad sebaceous glands are present. Full thickness keratinocyte atypia is seen, as indicated on the Mohs map consistent with squamous cell carcinoma in situ. Melanoma Histology Text: Atypical melanocytes are present within the epidermis at increased density. [place] of atypical melanocytes with focal pagetoid and follicular extension are seen, invasive melanoma was absent from the dermis in the sections examined Merkel Cell Carcinoma Histology Text: Sheets and small aggregates of cells with an infiltrative patter. The cells have scant cytoplasm and contain nuclei with a “salt and pepper” chromatin pattern. Afx Histology Text: Atypical spindle cells with irregular hyper-chromatic nuclei infiltrate the papillary dermis; deep invasion is not seen, consistent with atypical fibroxanthoma. Dfsp Histology Text: Monomorphic spindle cell proliferation infiltrating the dermis Mart-1 - Positive Histology Text: MART-1 staining demonstrates areas of higher density and clustering of melanocytes with Pagetoid spread upwards within the epidermis. The surgical margins are positive for tumor cells. Mart-1 - Negative Histology Text: MART-1 staining demonstrates a normal density and pattern of melanocytes along the dermal-epidermal junction. The surgical margins are negative for tumor cells. Information: Selecting Yes will display possible errors in your note based on the variables you have selected. This validation is only offered as a suggestion for you. PLEASE NOTE THAT THE VALIDATION TEXT WILL BE REMOVED WHEN YOU FINALIZE YOUR NOTE. IF YOU WANT TO FAX A PRELIMINARY NOTE YOU WILL NEED TO TOGGLE THIS TO 'NO' IF YOU DO NOT WANT IT IN YOUR FAXED NOTE. Bill 59 Modifier?: No - Continue to Bill 79 Modifier

## 2025-01-21 DIAGNOSIS — E11.9 TYPE 2 DIABETES MELLITUS WITHOUT COMPLICATION, WITHOUT LONG-TERM CURRENT USE OF INSULIN (H): ICD-10-CM

## 2025-01-23 RX ORDER — SEMAGLUTIDE 0.68 MG/ML
0.5 INJECTION, SOLUTION SUBCUTANEOUS
Qty: 3 ML | Refills: 0 | Status: SHIPPED | OUTPATIENT
Start: 2025-01-23

## 2025-01-27 ENCOUNTER — PATIENT OUTREACH (OUTPATIENT)
Dept: CARE COORDINATION | Facility: CLINIC | Age: 50
End: 2025-01-27
Payer: COMMERCIAL

## 2025-01-28 ENCOUNTER — HOSPITAL ENCOUNTER (OUTPATIENT)
Dept: ULTRASOUND IMAGING | Facility: CLINIC | Age: 50
Discharge: HOME | End: 2025-01-28
Payer: COMMERCIAL

## 2025-01-28 ENCOUNTER — NURSE TRIAGE (OUTPATIENT)
Dept: FAMILY MEDICINE | Facility: CLINIC | Age: 50
End: 2025-01-28

## 2025-01-28 ENCOUNTER — OFFICE VISIT (OUTPATIENT)
Dept: PEDIATRICS | Facility: CLINIC | Age: 50
End: 2025-01-28
Payer: COMMERCIAL

## 2025-01-28 VITALS
HEIGHT: 63 IN | WEIGHT: 210 LBS | DIASTOLIC BLOOD PRESSURE: 78 MMHG | SYSTOLIC BLOOD PRESSURE: 120 MMHG | BODY MASS INDEX: 37.21 KG/M2 | OXYGEN SATURATION: 100 % | RESPIRATION RATE: 22 BRPM | HEART RATE: 73 BPM | TEMPERATURE: 97.5 F

## 2025-01-28 DIAGNOSIS — M79.662 PAIN OF LEFT CALF: ICD-10-CM

## 2025-01-28 DIAGNOSIS — D68.52 PROTHROMBIN GENE MUTATION: ICD-10-CM

## 2025-01-28 DIAGNOSIS — M79.662 PAIN OF LEFT CALF: Primary | ICD-10-CM

## 2025-01-28 PROCEDURE — 99214 OFFICE O/P EST MOD 30 MIN: CPT

## 2025-01-28 PROCEDURE — 93971 EXTREMITY STUDY: CPT | Mod: LT

## 2025-01-28 ASSESSMENT — PAIN SCALES - GENERAL: PAINLEVEL_OUTOF10: MODERATE PAIN (4)

## 2025-01-28 NOTE — PROGRESS NOTES
Acute and Diagnostic Services Clinic Visit    Assessment & Plan     (M79.662) Pain of left calf  (primary encounter diagnosis)  Comment: Presents with 5 days of pain over the left posteromedial calf, from popliteal space to distal leg.  This finding occurs in the presence of a prothrombin gene mutation.  Exam is notable for reproducible tenderness over the posteromedial left calf, though no palpable venous cord, no significant swelling, no erythema.  Plan:   I am hesitant to rely on the D-dimer alone and this patient with a potential hypercoagulable state due to prothrombin gene mutation.  Therefore, I have ordered a US Lower Extremity Venous Duplex Left with Doretha's consent.    (D68.52) Prothrombin gene mutation  Comment: Diagnosed approximately 20 years ago.  She has not had prior episodes of venous thromboembolism.  Plan:   US Lower Extremity Venous Duplex Left ordered.    DISCUSSION/MEDICAL DECISION MAKING:    Left lower extremity ultrasound was negative for DVT.  However, the ultrasound did show edema within the left gastrocnemius at the mid and distal calf level, corresponding well with her discomfort.  Doretha, on additional questioning, does not recall any trauma to her left calf, though I let her know that sometimes trivial trauma can cause such an injury.  I do think that this muscle injury is likely to resolve over the next several days, as well her pain.  Doretha is going to adopt conservative options, including rest, elevation, consideration of heat, and any analgesia that she usually finds effective, and will monitor her response.  She agrees to contact her primary care provider, Dr. Naidu, should her pain fail to improve, should it worsen, or should she develop any new redness or swelling, which could trigger the need for reevaluation.    29 minutes were spent doing chart review, history and exam, documentation and further activities per the note.       BMI  Estimated body mass index is 37.2 kg/m  as  "calculated from the following:    Height as of this encounter: 1.6 m (5' 3\").    Weight as of this encounter: 95.3 kg (210 lb).     Subjective   Doretha is a 49 year old, presenting for the following health issues:  Leg Pain    HPI     Edema/Swelling  Onset/Duration: thurdays   Description:   Location: left ankle and to mid calf  Associated redness: redness  Associated skin changes:  No  Associated pain:  YES  Progression of Symptoms:  worsening  Accompanying Signs & Symptoms:  Chest pain: No  Shortness of breath: No           Nausea or vomiting: No  Lightheadedness: YES  Palpitations: No  Fever/Chills: No  Cough: No  History:   History of heart disease or heart failure: No  History of sleep apnea: YES  Tobacco use: No/former           Previous similar symptoms: no   Precipitating factors: none  Alleviating factors: none  Therapies tried: elevation, cold, and hot pack,staying hydrated ,bananas     Doretha Yu is a 49-year-old woman seen today in the Acute Diagnostic Services ([facility]) clinic at [facility] regarding left calf pain.    Doretha carries the prothrombin gene mutation, diagnosed approximately 20 years ago.  She has not had a prior episode of venous thromboembolism.  Starting about 5 days ago, she began to notice pain over the posterior medial right calf, at the mid calf level.  Since then, pain has persisted, and the area of pain has expanded, now occupying the area between the left popliteal space and just above the left ankle.  She describes the sensation as \"a constant charley horse\", and \"really painful\".  She has not been able to appreciate a palpable lump or venous cord.  Attempts at heat, elevation, and massage have been unsuccessful at relieving pain.  She says her right leg is \"totally normal\".      Review of Systems  As per the history of present illness.  No additional positives or negatives are obtained.      Objective    /78   Pulse 73   Temp 97.5  F (36.4  C) (Oral)   Resp 22   Ht " "1.6 m (5' 3\")   Wt 95.3 kg (210 lb)   SpO2 100%   BMI 37.20 kg/m    Body mass index is 37.2 kg/m .  Physical Exam   GENERAL: Pleasant woman, who appears to be in no acute distress.  EXTREM: Calf circumference is equal bilaterally.  There is no left lower extremity edema or erythema.  There is reproducible tenderness to palpation of the posteromedial calf from just below the popliteal space to just above the left ankle.  No venous cords are palpable.  No masses palpated within the left popliteal space.  NEURO: Alert, oriented, conversant.  Cranial nerves III - XII grossly intact.  No gross motor or sensory deficits.   PSYCH: Calm, alert, conversant.  Able to articulate logical thoughts, no tangential thoughts, no hallucinations or delusions.  Affect normal.    EXAM: US LOWER EXTREMITY VENOUS DUPLEX LEFT  LOCATION: [facility]  DATE: 1/28/2025     INDICATION: Patient with a history of prothrombin gene mutation presents with left calf pain. Please evaluate for DVT.  COMPARISON: Ultrasound 3/6/2018.  TECHNIQUE: Venous Duplex ultrasound of the left lower extremity with and without compression, augmentation and duplex. Color flow and spectral Doppler with waveform analysis performed.     FINDINGS: Exam includes the common femoral, femoral, popliteal, and contralateral common femoral veins as well as segmentally visualized deep calf veins and greater saphenous vein.      LEFT: No deep vein thrombosis. No superficial thrombophlebitis. No popliteal cyst. Please note that there is limited visualization of the left peroneal veins. There is edema and trace fluid adjacent to the left gastrocnemius muscle at the mid to distal   calf.                                                                      IMPRESSION:  1.  No deep venous thrombosis in the left lower extremity. There is limited assessment at the left peroneal veins.  2.  Small fluid and edema adjacent to the left leg gastrocnemius muscle " at the mid to distal calf        In 1025  Out 1044  In 1204  Out 1214    Signed Electronically by: Jeffry Yeh MD

## 2025-01-28 NOTE — TELEPHONE ENCOUNTER
"Reason for Disposition   Thigh or calf pain in only one leg and present > 1 hour    Additional Information   Negative: Looks like a broken bone or dislocated joint (e.g., crooked or deformed)   Negative: Sounds like a life-threatening emergency to the triager   Negative: Followed a hip injury   Negative: Followed a knee injury   Negative: Followed an ankle injury   Negative: Back pain radiating (shooting) into leg(s)   Negative: Foot pain is main symptom   Negative: Ankle pain is main symptom   Negative: Knee pain is main symptom   Negative: Leg swelling is main symptom   Negative: Chest pain   Negative: Difficulty breathing   Negative: Entire foot is cool or blue in comparison to other side   Negative: Unable to walk     Very painful to walk.   Negative: Swollen joint and fever   Negative: Fever and red area (or area very tender to touch)    Answer Assessment - Initial Assessment Questions  1. ONSET: \"When did the pain start?\"       5 days ago  2. LOCATION: \"Where is the pain located?\"       Calf pain  3. PAIN: \"How bad is the pain?\"    (Scale 1-10; or mild, moderate, severe)      Moderate to severe, \"very painful\"  4. WORK OR EXERCISE: \"Has there been any recent work or exercise that involved this part of the body?\"       no  5. CAUSE: \"What do you think is causing the leg pain?\"      Concerned for clot.  Has prothrombin mutation  6. OTHER SYMPTOMS: \"Do you have any other symptoms?\" (e.g., chest pain, back pain, breathing difficulty, swelling, rash, fever, numbness, weakness)      denies  7. PREGNANCY: \"Is there any chance you are pregnant?\" \"When was your last menstrual period?\"    Protocols used: Leg Pain-A-[place]    "

## 2025-01-28 NOTE — PATIENT INSTRUCTIONS
"As we discussed, the ultrasound today shows no evidence of clot in your left leg.  It does show \"edema\" of the left calf muscle itself, such as can be seen in muscle bruising.  Although you do not recall any trauma to the muscle, sometimes it does not take much.  I do think that this edema within the calf muscle is the cause of your discomfort, and that this muscle injury and associated pain are likely to resolve on their own over the next several days.  You may find that elevation and rest help, and perhaps heat might help with the symptoms.  Please use any medication that you usually use to control pain.  And please keep an eye on your leg.  If you notice ongoing pain, worsening pain, the development of swelling, or redness of the leg, please contact your primary care provider, as reevaluation could always be performed.    It was a pleasure meeting you today.  I hope your leg improves soon.  "

## 2025-01-28 NOTE — Clinical Note
I had the pleasure of seeing Doretha today in the [place] [facility] regarding left calf pain.  Ultrasound was negative for DVT, though did show edema within the muscle itself.  Doretha does not recall any trauma to the left calf, though perhaps trivial trauma occurred causing this muscle injury.  Doretha is going to try conservative measures such as elevation, heat, and analgesia, and agrees to give you a call if pain fails to improve, or should things worsen.  Thank you.

## 2025-02-10 ENCOUNTER — PATIENT OUTREACH (OUTPATIENT)
Dept: CARE COORDINATION | Facility: CLINIC | Age: 50
End: 2025-02-10
Payer: COMMERCIAL

## 2025-02-13 ENCOUNTER — PATIENT OUTREACH (OUTPATIENT)
Dept: CARE COORDINATION | Facility: CLINIC | Age: 50
End: 2025-02-13
Payer: COMMERCIAL

## 2025-02-13 DIAGNOSIS — F32.2 CURRENT SEVERE EPISODE OF MAJOR DEPRESSIVE DISORDER WITHOUT PSYCHOTIC FEATURES WITHOUT PRIOR EPISODE (H): ICD-10-CM

## 2025-02-13 RX ORDER — ARIPIPRAZOLE 5 MG/1
5 TABLET ORAL DAILY
Qty: 90 TABLET | Refills: 0 | Status: SHIPPED | OUTPATIENT
Start: 2025-02-13

## 2025-02-13 NOTE — TELEPHONE ENCOUNTER
Requested Prescriptions   Pending Prescriptions Disp Refills    ARIPiprazole (ABILIFY) 5 MG tablet [Pharmacy Med Name: ARIPIPRAZOLE 5MG TABS] 90 tablet 0     Sig: TAKE ONE TABLET BY MOUTH ONCE DAILY       Antipsychotic Medications Failed - 2/13/2025 11:53 AM        Failed - PHQ9 score less than 5 in past 6 monts     Please review last PHQ-9 score.           Passed - Most recent blood pressure under 140/90 in past 12 months        Passed - Patient is 12 years of age or older        Passed - Lipid panel on file within the past 12 months     Recent Labs   Lab Test 07/15/24  0920   CHOL 201*   TRIG 181*   HDL 49*   *   NHDL 152*               Passed - Heart Rate on file within past 12 months     Pulse Readings from Last 3 Encounters:   01/28/25 73   08/07/24 74   07/16/24 84               Passed - A1c or Glucose on file in past 12 months     Recent Labs   Lab Test 07/16/24  0923 07/15/24  0920 06/10/24  1406   GLC 92   < >  --    A1C  --   --  5.6    < > = values in this interval not displayed.       Please review patients last 3 weights. If a weight gain of >10 lbs exists, you may refill the prescription once after instructing the patient to schedule an appointment within the next 30 days.    Wt Readings from Last 3 Encounters:   01/28/25 95.3 kg (210 lb)   08/07/24 87.5 kg (193 lb)   07/16/24 87.5 kg (193 lb)             Passed - Medication is active on med list and the sig matches. RN to manually verify dose and sig if red X/fail.     If the protocol passes (green check), you do not need to verify med dose and sig.    A prescription matches if they are the same clinical intention.    For Example: once daily and every morning are the same.    For all fails (red x), verify dose and sig.    If the refill does match what is on file, the RN can still proceed to approve the refill request.     If they do not match, route to the appropriate provider.             Passed - Recent (12 month) or future (90 days) visit  with authorizing provider s specialty     The patient must have completed an in-person or virtual visit within the past 12 months or has a future visit scheduled within the next 90 days with the authorizing provider s specialty.  Urgent care and e-visits do not qualify as an office visit for this protocol.          Passed - Medication indicated for associated diagnosis     Medication is associated with one or more of the following diagnoses:             Agitation            Autistic disorder            Bipolar disorder            Chemotherapy-induced nausea and vomiting            Delirium            Depression            Schizophrenia             Mood disorder            Passed - Patient is not pregnant        Passed - No positve pregnancy test on file in past 12 months

## 2025-02-15 ENCOUNTER — OFFICE VISIT (OUTPATIENT)
Dept: URGENT CARE | Facility: URGENT CARE | Age: 50
End: 2025-02-15
Payer: COMMERCIAL

## 2025-02-15 ENCOUNTER — ANCILLARY PROCEDURE (OUTPATIENT)
Dept: GENERAL RADIOLOGY | Facility: CLINIC | Age: 50
End: 2025-02-15
Payer: COMMERCIAL

## 2025-02-15 VITALS
OXYGEN SATURATION: 98 % | WEIGHT: 210 LBS | HEART RATE: 89 BPM | BODY MASS INDEX: 37.2 KG/M2 | DIASTOLIC BLOOD PRESSURE: 80 MMHG | SYSTOLIC BLOOD PRESSURE: 117 MMHG | RESPIRATION RATE: 18 BRPM | TEMPERATURE: 98.7 F

## 2025-02-15 DIAGNOSIS — R05.1 ACUTE COUGH: Primary | ICD-10-CM

## 2025-02-15 DIAGNOSIS — R05.1 ACUTE COUGH: ICD-10-CM

## 2025-02-15 DIAGNOSIS — C43.9 METASTATIC MALIGNANT MELANOMA (H): ICD-10-CM

## 2025-02-15 PROCEDURE — 99214 OFFICE O/P EST MOD 30 MIN: CPT

## 2025-02-15 PROCEDURE — 71046 X-RAY EXAM CHEST 2 VIEWS: CPT | Mod: TC | Performed by: RADIOLOGY

## 2025-02-15 RX ORDER — BENZONATATE 200 MG/1
200 CAPSULE ORAL 3 TIMES DAILY PRN
Qty: 30 CAPSULE | Refills: 0 | Status: SHIPPED | OUTPATIENT
Start: 2025-02-15

## 2025-02-15 RX ORDER — ALBUTEROL SULFATE 90 UG/1
2 INHALANT RESPIRATORY (INHALATION) EVERY 6 HOURS PRN
Qty: 18 G | Refills: 0 | Status: SHIPPED | OUTPATIENT
Start: 2025-02-15

## 2025-02-15 NOTE — PROGRESS NOTES
URGENT CARE  Assessment & Plan   Assessment:   Doretha Yu is a 49 year old female who's clinical presentation today is consistent with:   1. Acute cough   2. Reission of malignant melanoma  - XR Chest 2 Views; Future  - benzonatate (TESSALON) 200 MG capsule;   - albuterol (PROAIR HFA/PROVENTIL HFA/VENTOLIN HFA) 108 (90 Base) MCG/ACT inhaler;   Plan:  Patient presents today with one week of a cough and other URI symptoms, chest xray was reassuring today, no signs of pneumonia    The patient has a history of metastatic malignant melanoma, who has been in complete remission for 6 years, patient is not otherwise immunocompromised, last CBC had adequate white blood cell of neutrophil counts, patient also has a history of severe colitis and is status post colectomy, given cancer history I considered empiric antibiotic coverage however she does not appear to be immunocompromised, furthermore given her colitis history I do not think a cavalier use of antibiotics would be appropriate today, thus conservative treatment approach will be used first, will have patient try tessalon and inhaler for her cough and chest heaviness, along with fluids and rest  if symptoms do not improve after starting today's treatment to follow up in 5-7 days, sooner if symptoms worsen, return precautions given  Given cancer and cough PE was on the differential, however, patient is without hemoptysis, prolonged immobilization, no tachypnea or tachycardia noted, no pleuritic pain, o2 sats were wnl and cough seems infectious in origin   No alarm signs or symptoms present   Differential Diagnoses for this patient's chief complaint that I considered include:   Bacterial vs viral etiology of cough, pharyngitis, pneumonia, bronchitis, pertussis, pulmonary embolism, allergic rhinitis/PND/UACS, asthma/COPD exacerbation, postinfectious cough      Patient is agreeable to treatment plan and state they will follow-up if symptoms do not improve and/or if  symptoms worsen   see patient's AVS 'monitor for' section for specific patient instructions given and discussed regarding what to watch for and when to follow up    AMADA Hendrickson [facility]      ______________________________________________________________________      Subjective     HPI: Doretha Yu is a 49 year old  female who presents today for evaluation the following concerns:   Patient endorses a cough today which they state they have had for one week    Patient reports coughing, chest congestion and fatigue  Patient also reports mild fever on and off  EMR reveals asthma hx   Patient denies any difficulty breathing, chest pain, denies pleuritic pain     Review of Systems:  Pertinent review of systems as reflected in HPI, otherwise negative.     Objective    Physical Exam:  Vitals:    02/15/25 1533   BP: 117/80   Pulse: 89   Resp: 18   Temp: 98.7  F (37.1  C)   TempSrc: Tympanic   SpO2: 98%   Weight: 95.3 kg (210 lb)      General: Alert and oriented, no acute distress, Vital signs reviewed: afebrile,  normotensive   Psy/mental status: Cooperative, nonanxious  SKIN: Intact, no rashes  EYES: EOMs intact, PERRLA bilaterally   Conjunctiva: Clear bilaterally, no injection or erythema present  EARS: TMs intact, translucent gray in color with normal landmarks present no erythema  or bulging tympanic membrane   Canals are without swelling, however have a mild amount of cerumen, no impaction  NOSE:  mucosa moist               No frontal or maxillary sinus tenderness present bilaterally  MOUTH/THROAT: lips, tongue, & oral mucosa appear normal upon inspection                Posterior oropharynx is erythematous but without exudate, lesions or tonsillar  Edema, no dysphonia, no unilateral tonsillar edema, no uvular deviation,   no signs of peritonsillar abscess  NECK: supple, has full range of motion with no meningeal signs              No lymphadenopathy present  LUNG: normal work  of breathing, good respiratory effort without retractions, good air  movement, non labored, inspection reveals normal chest expansion w/  inspiration            Lung sounds are clear to auscultation bilaterally,            No rales/rhonic/crackles wheezing noted           No cough noted or heard in clinic today     Imaging:   All images were personally read by this provider (myself).   Per my independent interpretation the xray shows no signs of consolidation or infiltrates suggesting pneumonia       ______________________________________________________________________    I explained my diagnostic considerations and recommendations to the patient, who voiced understanding and agreement with the treatment plan.   All questions were answered.   We discussed potential side effects, risks and benefits of any prescribed or recommended therapies, as well as expectations for response to treatments.  Please see AVS for any patient instructions & handouts given.   Patient was advised to contact the Nurse Care Line, their Primary Care provider, Urgent Care, or the Emergency Department if there are new or worsening symptoms, or call 911 for emergencies.

## 2025-02-15 NOTE — PATIENT INSTRUCTIONS
Diagnosis: acute cough, concern for pneumonia   Today we did:  Will get a chest xray and some lab work    Can go home and will call you when results are done and make plan     Plan:   Start with an anti-cough pill and an inhaler  Will help with the chest heaviness and also the cough

## 2025-02-20 ENCOUNTER — TELEPHONE (OUTPATIENT)
Dept: FAMILY MEDICINE | Facility: CLINIC | Age: 50
End: 2025-02-20
Payer: COMMERCIAL

## 2025-02-20 NOTE — TELEPHONE ENCOUNTER
Patient Quality Outreach    Patient is due for the following:   Mammogram    Action(s) Taken:   - Complete mammogram  - Schedule wellness visit  - ACT/PHQ/SANDRA/AAP  - Vaccine update    Type of outreach:    Sent Qool message.    Questions for provider review:    None           Ruth Ann Gudino, CMA

## 2025-02-20 NOTE — LETTER
My Asthma Action Plan    Name: Doretha Yu   YOB: 1975  Date: 2/20/2025   My doctor: Anna Naidu MD   My clinic: [facility]        My Rescue Medicine:   Albuterol inhaler (Proair/Ventolin/Proventil HFA)  2-4 puffs EVERY 4 HOURS as needed. Use a spacer if recommended by your provider.   My Asthma Severity:   Intermittent / Exercise Induced  Know your asthma triggers: upper respiratory infections             GREEN ZONE   Good Control  I feel good  No cough or wheeze  Can work, sleep and play without asthma symptoms       Take your asthma control medicine every day.     If exercise triggers your asthma, take your rescue medication  15 minutes before exercise or sports, and  During exercise if you have asthma symptoms  Spacer to use with inhaler: If you have a spacer, make sure to use it with your inhaler             YELLOW ZONE Getting Worse  I have ANY of these:  I do not feel good  Cough or wheeze  Chest feels tight  Wake up at night   Keep taking your Green Zone medications  Start taking your rescue medicine:  every 20 minutes for up to 1 hour. Then every 4 hours for 24-48 hours.  If you stay in the Yellow Zone for more than 12-24 hours, contact your doctor.  If you do not return to the Green Zone in 12-24 hours or you get worse, start taking your oral steroid medicine if prescribed by your provider.           RED ZONE Medical Alert - Get Help  I have ANY of these:  I feel awful  Medicine is not helping  Breathing getting harder  Trouble walking or talking  Nose opens wide to breathe       Take your rescue medicine NOW  If your provider has prescribed an oral steroid medicine, start taking it NOW  Call your doctor NOW  If you are still in the Red Zone after 20 minutes and you have not reached your doctor:  Take your rescue medicine again and  Call 911 or go to the emergency room right away    See your regular doctor within 2 weeks of an Emergency Room or Urgent Care  visit for follow-up treatment.          Annual Reminders:  Meet with Asthma Educator,  Flu Shot in the Fall, consider Pneumonia Vaccination for patients with asthma (aged 19 and older).    Pharmacy:    [facility] [place] [address] [address]    Electronically signed by Anna Naidu MD   Date: 02/20/25                    Asthma Triggers  How To Control Things That Make Your Asthma Worse    Triggers are things that make your asthma worse.  Look at the list below to help you find your triggers and   what you can do about them. You can help prevent asthma flare-ups by staying away from your triggers.      Trigger                                                          What you can do   Cigarette Smoke  Tobacco smoke can make asthma worse. Do not allow smoking in your home, car or around you.  Be sure no one smokes at a child s day care or school.  If you smoke, ask your health care provider for ways to help you quit.  Ask family members to quit too.  Ask your health care provider for a referral to Quit Plan to help you quit smoking, or call 8-779-843-PLAN.     Colds, Flu, Bronchitis  These are common triggers of asthma. Wash your hands often.  Don t touch your eyes, nose or mouth.  Get a flu shot every year.     Dust Mites  These are tiny bugs that live in cloth or carpet. They are too small to see. Wash sheets and blankets in hot water every week.   Encase pillows and mattress in dust mite proof covers.  Avoid having carpet if you can. If you have carpet, vacuum weekly.   Use a dust mask and HEPA vacuum.   Pollen and Outdoor Mold  Some people are allergic to trees, grass, or weed pollen, or molds. Try to keep your windows closed.  Limit time out doors when pollen count is high.   Ask you health care provider about taking medicine during allergy season.     Animal Dander  Some people are allergic to skin flakes, urine or saliva from pets with fur  or feathers. Keep pets with fur or feathers out of your home.    If you can t keep the pet outdoors, then keep the pet out of your bedroom.  Keep the bedroom door closed.  Keep pets off cloth furniture and away from stuffed toys.     Mice, Rats, and Cockroaches  Some people are allergic to the waste from these pests.   Cover food and garbage.  Clean up spills and food crumbs.  Store grease in the refrigerator.   Keep food out of the bedroom.   Indoor Mold  This can be a trigger if your home has high moisture. Fix leaking faucets, pipes, or other sources of water.   Clean moldy surfaces.  Dehumidify basement if it is damp and smelly.   Smoke, Strong Odors, and Sprays  These can reduce air quality. Stay away from strong odors and sprays, such as perfume, powder, hair spray, paints, smoke incense, paint, cleaning products, candles and new carpet.   Exercise or Sports  Some people with asthma have this trigger. Be active!  Ask your doctor about taking medicine before sports or exercise to prevent symptoms.    Warm up for 5-10 minutes before and after sports or exercise.     Other Triggers of Asthma  Cold air:  Cover your nose and mouth with a scarf.  Sometimes laughing or crying can be a trigger.  Some medicines and food can trigger asthma.

## 2025-02-24 DIAGNOSIS — E11.9 TYPE 2 DIABETES MELLITUS WITHOUT COMPLICATION, WITHOUT LONG-TERM CURRENT USE OF INSULIN (H): ICD-10-CM

## 2025-02-24 RX ORDER — SEMAGLUTIDE 0.68 MG/ML
INJECTION, SOLUTION SUBCUTANEOUS
Qty: 3 ML | Refills: 0 | Status: SHIPPED | OUTPATIENT
Start: 2025-02-24

## 2025-03-02 ENCOUNTER — HEALTH MAINTENANCE LETTER (OUTPATIENT)
Age: 50
End: 2025-03-02

## 2025-03-04 ENCOUNTER — TELEPHONE (OUTPATIENT)
Dept: FAMILY MEDICINE | Facility: CLINIC | Age: 50
End: 2025-03-04
Payer: COMMERCIAL

## 2025-03-04 DIAGNOSIS — F32.9 CURRENT EPISODE OF MAJOR DEPRESSIVE DISORDER WITHOUT PRIOR EPISODE, UNSPECIFIED DEPRESSION EPISODE SEVERITY: ICD-10-CM

## 2025-03-06 NOTE — TELEPHONE ENCOUNTER
Care team please call pt to update phq/connie or direct to respond via [employer].   Jennifer Yeh MSN, RN

## 2025-03-07 NOTE — TELEPHONE ENCOUNTER
Left message to call the care team or log into [employer] to complete needed questionnaires in order to receive refill.    Annamarie GAO LPN

## 2025-03-10 RX ORDER — VENLAFAXINE 75 MG/1
150 TABLET ORAL 2 TIMES DAILY
Qty: 120 TABLET | Refills: 0 | Status: SHIPPED | OUTPATIENT
Start: 2025-03-10

## 2025-03-12 ENCOUNTER — MYC MEDICAL ADVICE (OUTPATIENT)
Dept: GASTROENTEROLOGY | Facility: CLINIC | Age: 50
End: 2025-03-12

## 2025-03-17 ENCOUNTER — TELEPHONE (OUTPATIENT)
Dept: GASTROENTEROLOGY | Facility: CLINIC | Age: 50
End: 2025-03-17
Payer: COMMERCIAL

## 2025-03-17 NOTE — TELEPHONE ENCOUNTER
"  Contacted pt for resched an appt w/  - pt no showed 3/12     Pt is now sched on 5/1     3/17 AA        Per TEJINDER Saleh (See Below)     \"Hello,     This patient did not show up to clinic today. Could you call her within the next week and try to see if she can reschedule?     Thanks!     Delfino Avila\"  "

## 2025-03-29 ENCOUNTER — HEALTH MAINTENANCE LETTER (OUTPATIENT)
Age: 50
End: 2025-03-29

## 2025-05-01 ENCOUNTER — VIRTUAL VISIT (OUTPATIENT)
Dept: GASTROENTEROLOGY | Facility: CLINIC | Age: 50
End: 2025-05-01
Payer: COMMERCIAL

## 2025-05-01 ENCOUNTER — TELEPHONE (OUTPATIENT)
Dept: GASTROENTEROLOGY | Facility: CLINIC | Age: 50
End: 2025-05-01

## 2025-05-01 DIAGNOSIS — K52.9 CHRONIC DIARRHEA: Primary | ICD-10-CM

## 2025-05-01 RX ORDER — DIPHENOXYLATE HYDROCHLORIDE AND ATROPINE SULFATE 2.5; .025 MG/1; MG/1
2 TABLET ORAL 2 TIMES DAILY
Qty: 180 TABLET | Refills: 3 | Status: SHIPPED | OUTPATIENT
Start: 2025-05-01

## 2025-05-01 NOTE — TELEPHONE ENCOUNTER
Patient missed her 10:40am appointment with Dr. Davey this morning. Spoke to provider and he can see her at noon for a virtual visit. Called patient she she agreed to this plan. Appointment rescheduled.

## 2025-05-01 NOTE — NURSING NOTE
Current patient location: Patient declined to provide     Is the patient currently in the state of [place]? YES    Visit mode: VIDEO    If the visit is dropped, the patient can be reconnected by:VIDEO VISIT: Send to e-mail at: [email or web address]    Will anyone else be joining the visit? NO  (If patient encounters technical issues they should call [phone]    Are changes needed to the allergy or medication list? No    Are refills needed on medications prescribed by this physician? NO    Rooming Documentation:  Questionnaire(s) completed    Reason for visit: RECHECK    Alyssa SERRAF

## 2025-05-01 NOTE — TELEPHONE ENCOUNTER
Health Call Center    Phone Message    May a detailed message be left on voicemail: yes     Reason for Call: Other: Doretha is calling in returning a call that she had missed from Dr. Davey, as a message was left for her to call the clinic. Please call back as soon as possible to discuss.     Action Taken: Message routed to:  [facility] GI    Travel Screening: Not Applicable     Date of Service:

## 2025-05-01 NOTE — PATIENT INSTRUCTIONS
It was a pleasure meeting with you today and discussing your healthcare plan. Below is a summary of what we covered:    Try the benefiber powder. Start 1 tablespoon per day and slowly work up to 2-3 tablespoons per day. If you do not tolerate the benefiber you can try metamucil tablets or wafers. There are also citrucel tablets you can try.    I sent the prescription for the lomotil. Take 1-2 tablets in the AM and at bedtime. You can take the imodium as needed.    Follow up in 6 months      Please see below for any additional questions and scheduling guidelines.    Sign up for [employer]: [employer] patient portal serves as a secure platform for accessing your medical records from the [facility]. Additionally, [employer] facilitates easy, timely, and secure messaging with your care team. If you have not signed up, you may do so by using the provided code or calling [phone].    Coordinating your care after your visit:  There are multiple options for scheduling your follow-up care based on your provider's recommendation.    How do I schedule a follow-up clinic appointment:   After your appointment, you may receive scheduling assistance with the Clinic Coordinators by having a seat in the waiting room and a Clinic Coordinator will call you up to schedule.  Virtual visits or after you leave the clinic:  Your provider has placed a follow-up order in the [employer] portal for scheduling your return appointment. A member of the scheduling team will contact you to schedule.  [employer] Scheduling: Timely scheduling through [employer] is advised to ensure appointment availability.   Call to schedule: You may schedule your follow-up appointment(s) by calling [phone], option 1.    How do I schedule my endoscopy or colonoscopy procedure:  If a procedure, such as a colonoscopy or upper endoscopy was ordered by your provider, the scheduling team will contact you to schedule this procedure. Or you may choose to call to  schedule at   [phone], option 2.  Please allow 20-30 minutes when scheduling a procedure.    How do I get my blood work done? To get your blood work done, you need to schedule a lab appointment at an [facility]. There are multiple ways to schedule:   At the clinic: The Clinic Coordinator you meet after your visit can help you schedule a lab appointment.   [employer] scheduling: [facility] offers online lab scheduling at all [facility] laboratory locations.   Call to schedule: You can call [phone] to schedule your lab appointment.    How do I schedule my imaging study: To schedule imaging studies, such as CT scans, ultrasounds, MRIs, or X-rays, contact Imaging Services at [phone].    How do I schedule a referral to another doctor: If your provider recommended a referral to another specialist(s), the referral order was placed by your provider. You will receive a phone call to schedule this referral, or you may choose to call the number attached to the referral to self-schedule.    For Post-Visit Question(s):  For any inquiries following today's visit:  Please utilize [employer] messaging and allow 48 hours for reply or contact the Call Center during normal business hours at [phone], option 3.  For Emergent After-hours questions, contact the On-Call GI Fellow through the [facility]  at [phone].  In addition, you may contact your Nurse directly using the provided contact information.    Test Results:  Test results will be accessible via [employer] in compliance with the 21st Century Cures Act. This means that your results will be available to you at the same time as your provider. Often you may see your results before your provider does. Results are reviewed by staff within two weeks with communication follow-up. Results may be released in the patient portal prior to your care team review.    Prescription Refill(s):  Medication prescribed by your provider will be  addressed during your visit. For future refills, please coordinate with your pharmacy. If you have not had a recent clinic visit or routine labs, for your safety, your provider may not be able to refill your prescription.

## 2025-05-01 NOTE — LETTER
5/1/2025      Droetha Yu  [address] [facility]  [place] [address]      Dear Colleague,    Thank you for referring your patient, Doretha Yu, to the [facility] [place]. Please see a copy of my visit note below.    Virtual Visit Details    Type of service:  Video Visit     Originating Location (pt. Location): Home    Distant Location (provider location):  On-site  Platform used for Video Visit: Vestmark    Video start: 12:06 PM  Video end: 12:26 PM    IBD CLINIC VISIT     CC/REFERRING MD:  Anna Naidu    REASON FOR CONSULTATION: follow up    ASSESSMENT/PLAN    1. Ulcerative colitis s/p colectomy with IPAA:   Current medications: imodium 2 pills at bedtime  Current clinical disease activity: stable  Last endoscopic disease activity: pouchoscopy 8/2024 - normal pre-pouch ileum and pouch - minimal concentric ulceration at staple line. Biopsies of pre-pouch ileum and pouch unremarkable    Overall doing well but due to insurance not covering imodium she has only been able to take 2 pills per day. Will add fiber (discussed benefiber is tasteless and hopefully less of a texture issue) and also retry Rx for lomotil. She agrees.    -start benefiber 1 tablespoon per day - increase up to 2-3 tablespoons. If this does not help or you cannot tolerate this powder then you can try tablets  -Lomotil 1-2 pills BID.  -Imodium PRN    2. IBD dysplasia surveillance:   due 2029    3. Regurgitation - resolved with avoiding gluten (she has been tested for celiac and does not have celiac.     Return to clinic in 6 months    Thank you for this consultation.  It was a pleasure to participate in the care of this patient; please contact us with any further questions.  I spent a total of 40 minutes during the day of encounter performed chart review, meeting with patient, patient counseling, care coordination, and documentation.    The longitudinal plan of care for the diagnosis(es)/condition(s) as  documented were addressed during this visit. Due to the added complexity in care, I will continue to support this patient in the subsequent management and with the ongoing continuity of care    This note was created with voice recognition software, and while reviewed for accuracy, typos may remain.     Renard Davey MD  Inflammatory Bowel Disease Program   Division of Gastroenterology, Hepatology and Nutrition  [facility]        IBD HISTORY  Age at diagnosis: 2021  Extent of disease: proctosigmoid   Current UC medications: None  Prior UC surgeries:  total abdominal colectomy and end ileostomy 6/15/21   Prior IBD Medications:   Infliximab, 1 dose in the hospital good response  Vedolizumab, completed induction dosing  Prednisone, minimal response to high-dose oral and IV with significant weight gain  5-ASA, no response  Topical therapies to include enemas, no response    DRUG MONITORING  TPMT enzyme activity: --    6-TGN/6-MMPN levels: --    Biologic concentration: --         DISEASE ASSESSMENT    Endoscopic assessment:   EGD 2/14/23 ([place])  Post-op Diagnoses:        - LA Grade A reflux esophagitis.        - Erythematous mucosa in the prepyloric region of the stomach. Biopsied.        - A few fundic gland polyps.        - A single non-bleeding angioectasia in the duodenum (posterior wall).        - Biopsies were taken with a cold forceps for evaluation of celiac        disease.        - No clear cause of abdominal pain identified. No evidence of recurrent        melanoma.      Surgical Path:  Colon path 6/15/23:  FINAL DIAGNOSIS:   COLON, RESECTION:   - Moderate chronic active colitis with crypt abscess formation ([place]   grade 3)   - Negative for dysplasia and malignancy   - Viable surgical margins   - Two benign lymph nodes (0/2)      Surgical Pathology (9/5/2023):  A. ILEUM, ILEOSTOMY TRIM;  Ileo-cutaneous anastomotic junction with nonspecific mucosa inflammation, congestion, and other  features consistent with ileostomy; no dysplasia or malignancy  B. RECTUM, COMPLETION PROCTECTOMY:  Mildly active chronic proctitis with:   -Neutrophilic cryptitis, mucosal atrophy and prominent reactive lymphoid hyperplasias  -Reactive mesorectal lymph nodes;  no dysplasia or malignancy  C. COLON, ANASTOMOTIC RINGS; EXCISION:   -Portions of small intestinal mucosa and wall with no morphologic abnormalities   -Portion of rectal mucosa and wall with chronic proctitis; no dysplasia or malignancy      Enterography:   -- MRE 12/27/2022 no findings for acute inflammation of the bowel     Fecal calprotectin: --     C diff: negative 10/26/23, negative 1/12/23,  was positive 3/2021 treated with vanco taper (at [place])     HPI:   Currently, here for routine follow up.     Overall doing well and hanging in there. Having 8-10 BMs per day (much improved) but 5 of these are at night and urgent. Takes imodium 2 pills before bed which helps. But was taking more (4 pills BID). Insurance no longer covers it (had to get new insurance). Cost for over the counter is too much to take that often. So now only taking 2 pills before bed.    She is not taking fiber. Did not like taste and consistency of citrucel. Wonders what else there is.     Regurgitation is better - thinks better since off gluten.    No GERD, dysphgia/odynophagia. No joint pains, rashes, mouth sores    Gunter score:  NA - s/p colectomy    Extra intestinal manifestations of IBD:  No uveitis/episcleritis  No aphthous ulcers   No arthritis   No erythema nodosum/pyoderma gangrenosum.     sIBDQ:  IBDQ Score Date IBDQ - Total Score  (Higher score better)   5/1/2025  11:56 AM 44   3/26/2024   2:01 PM 39   4/10/2018   7:20 AM 32          ROS:    Constitutional, HEENT, cardiovascular, pulmonary, GI, , musculoskeletal, neuro, skin, endocrine and psych systems are negative, except as otherwise noted.    PHYSICAL EXAMINATION:  Constitutional: aaox3, cooperative, pleasant, not  dyspneic/diaphoretic, no acute distress  Vitals reviewed: There were no vitals taken for this visit.  Wt:   Wt Readings from Last 2 Encounters:   02/15/25 95.3 kg (210 lb)   25 95.3 kg (210 lb)      Eyes: Sclera anicteric/injected  Respiratory: Unlabored breathing  Skin: o jaundice  Psych: Normal affect    PERTINENT PAST MEDICAL HISTORY:  Past Medical History:   Diagnosis Date     Abnormal MRI     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      Anxiety      Basal cell carcinoma      Cervical high risk HPV (human papillomavirus) test positive 2019    See problem list     Colitis      Depression      Diabetes mellitus, iatrogenic (H) 2020     Esophageal reflux      Inflammatory arthritis      Insomnia      Intestinal giardiasis 2018     Lumbago     left lower back pain     Lymphedema      Malignant melanoma (H)      Melanoma (H) 10/23/2017     Migraines      Mild persistent asthma      Morbid obesity (H) 2019     Morbid obesity with BMI of 40.0-44.9, adult (H)      STORMY (obstructive sleep apnea)      Prothrombin deficiency (H)     takes 81mg asa daily     Stroke (cerebrum) (H)     During      TIA (transient ischemic attack)      Type 2 diabetes mellitus (H)      Ulcerative pancolitis (H)        PREVIOUS SURGERIES:  Past Surgical History:   Procedure Laterality Date     APPENDECTOMY       COLONOSCOPY N/A 10/18/2017    Procedure: COLONOSCOPY;  Colon;  Surgeon: Debbie Stephens MD;  [facility]     COLONOSCOPY N/A 2018    Procedure: COMBINED COLONOSCOPY, SINGLE OR MULTIPLE BIOPSY/POLYPECTOMY BY BIOPSY;  colon;  Surgeon: Benita Schumacher MD;  Location: [facility]     COLONOSCOPY      multiple since  to present - about 6 total     DAVINCI ASSISTED TRANSANAL TOTAL MESORECTAL EXCISION N/A 2023    Procedure: COMPLETION PROCTECTOMY, ROBOT-ASSISTED, ILEAL POUCH ANASTAMOSIS;  Surgeon: Quinton Ram MD;  [facility]     DAVINCI HERNIORRHAPHY  VENTRAL N/A 2024    Procedure: HERNIORRHAPHY, VENTRAL, ROBOT-ASSISTED;  Surgeon: Arcadio Carrillo MD;  [facility]     DISSECT LYMPH NODE AXILLA Left 10/23/2017    Procedure: DISSECT LYMPH NODE AXILLA;  Left Axillary Lymph Node Dissection ;  Surgeon: Laurent Cool MD;  [facility]     ESOPHAGOSCOPY, GASTROSCOPY, DUODENOSCOPY (EGD), COMBINED N/A 2024    Procedure: ESOPHAGOGASTRODUODENOSCOPY, WITH BIOPSY;  Surgeon: Renard Davey MD;  Location:  GI     EXAM UNDER ANESTHESIA PELVIC N/A 2020    Procedure: EXAM UNDER ANESTHESIA, PELVIS; with Cervical Biopsies, Vaginal Biopsy and Endocervical Curettings;  Surgeon: Melina Jung MD;  [facility]     GYN SURGERY  ,          LAPAROSCOPIC ASSISTED COLECTOMY N/A 06/15/2021    Procedure: laparoscopic total abdominal colectomy, end ileostomy;  Surgeon: Quinton Ram MD;  [facility]     POUCHOSCOPY, BIOPSY N/A 2024    Procedure: Pouchoscopy with biopsy;  Surgeon: Renard Davey MD;  Location:  GI     REPAIR MOHS Left 2017    Procedure: REPAIR MOHS;  Left Upper Lid Moh's Reconstruction;  Surgeon: Kisha Bosch MD;  [facility]     SIGMOIDOSCOPY FLEXIBLE N/A 2023    Procedure: Sigmoidoscopy flexible;  Surgeon: Quinton Ram MD;  [facility]     TAKEDOWN ILEOSTOMY N/A 2023    Procedure: CLOSURE, ILEOSTOMY;  Surgeon: Quinton Ram MD;  [facility]       ALLERGIES:     Allergies   Allergen Reactions     Bee Venom Swelling     Azithromycin Diarrhea     Erythromycin      Other reaction(s): GI intolerance, Vomiting     Fentanyl Other (See Comments)     sweating  sweating     Prochlorperazine Fatigue     Other reaction(s): Other (see comments)  Fatigue     Buspirone      Other reaction(s): GI intolerance  vomiting     Erythrocin Nausea and Vomiting     Gluten Meal      Celiac disease     Topamax [Topiramate]      Made her lethargic      Zithromax [Azithromycin Dihydrate] Diarrhea     Enbrel [Etanercept] Hives and Rash       PERTINENT MEDICATIONS:    Current Outpatient Medications:      diphenoxylate-atropine (LOMOTIL) 2.5-0.025 MG tablet, Take 2 tablets by mouth 2 times daily., Disp: 180 tablet, Rfl: 3     acetaminophen (TYLENOL) 325 MG tablet, Take 3 tablets (975 mg) by mouth 3 times daily, Disp: , Rfl:      albuterol (PROAIR HFA/PROVENTIL HFA/VENTOLIN HFA) 108 (90 Base) MCG/ACT inhaler, Inhale 2 puffs into the lungs every 6 hours as needed for shortness of breath, wheezing or cough., Disp: 18 g, Rfl: 0     ARIPiprazole (ABILIFY) 5 MG tablet, Take 1 tablet (5 mg) by mouth daily. ** VISIT DUE/LAST FILL **, Disp: 90 tablet, Rfl: 0     benzonatate (TESSALON) 200 MG capsule, Take 1 capsule (200 mg) by mouth 3 times daily as needed for cough., Disp: 30 capsule, Rfl: 0     diphenoxylate-atropine (LOMOTIL) 2.5-0.025 MG tablet, Take 2 tablets by mouth 2 times daily as needed for diarrhea, Disp: 240 tablet, Rfl: 5     loperamide (IMODIUM) 2 MG capsule, Take 1 capsule (2 mg) by mouth 4 times daily as needed for diarrhea Up to 8/day, Disp: 240 capsule, Rfl: 11     medical cannabis (Patient's own supply), , Disp: , Rfl:      ondansetron (ZOFRAN ODT) 4 MG ODT tab, DISSOLVE ONE TABLET BY MOUTH EVERY 8 HOURS AS NEEDED FOR NAUSEA., Disp: 20 tablet, Rfl: 1     OZEMPIC, 0.25 OR 0.5 MG/DOSE, 2 MG/3ML pen, INJECT 0.5 MG UNDER THE SKIN EVERY 7 DAYS **DUE FOR OFFICE VISIT/LAST FILL**, Disp: 3 mL, Rfl: 0     venlafaxine (EFFEXOR) 75 MG tablet, TAKE 2 TABLETS BY MOUTH 2 TIMES DAILY. ** VISIT DUE/LAST FILL **, Disp: 120 tablet, Rfl: 0  No current facility-administered medications for this visit.    Facility-Administered Medications Ordered in Other Visits:      lidocaine 1 % 9 mL, 9 mL, Intradermal, Once, Evangelista, Anna C, MD     sodium bicarbonate 8.4 % injection 1 mEq, 1 mEq, Intradermal, Once, Anna Naidu MD    SOCIAL HISTORY:  Social History     Socioeconomic  "History     Marital status:      Spouse name: Not on file     Number of children: 2     Years of education: Not on file     Highest education level: Not on file   Occupational History     Occupation: on short term disability currently - is an    Tobacco Use     Smoking status: Former     Current packs/day: 0.00     Average packs/day: 1 pack/day for 5.0 years (5.0 ttl pk-yrs)     Types: Cigarettes     Start date: 3/20/1993     Quit date: 3/20/1998     Years since quittin.1     Passive exposure: Past     Smokeless tobacco: Never   Vaping Use     Vaping status: Never Used   Substance and Sexual Activity     Alcohol use: Not Currently     Drug use: Yes     Types: Marijuana     Comment: vape, edible, weekly     Sexual activity: Not Currently     Partners: Male     Birth control/protection: Surgical   Other Topics Concern     Parent/sibling w/ CABG, MI or angioplasty before 65F 55M? No   Social History Narrative    20 y.o- patient's mother   of lung cancer. She had to take care of her younger sister.    2012- patient's  had a heart attack with stents placed, followed by cardiac rehabilitation    2000 TO 2012  was in [facility] hospital for depression    2013 patient's  went through alcohol rehabilitation at [facility] inpatient            They have attended couple counseling a couple of times and patient went to the family program for chemical dependency.    Patient denies alcohol or drug use and herself            Has 2 children, girls ages 17 and 14. Oldest has been a \"trouble maker.\"     For a while she was working 3 jobs since her  was ill. Works at the [employer] for [employer]. Reports her job is very stressful.         Social Drivers of Health     Financial Resource Strain: Unknown (2024)    Financial Resource Strain      Within the past 12 months, have you or your family members you " live with been unable to get utilities (heat, electricity) when it was really needed?: Patient declined   Food Insecurity: Food Insecurity Present (3/27/2024)    Received from [facility]    Hunger Vital Sign      Worried About Running Out of Food in the Last Year: Sometimes true      Ran Out of Food in the Last Year: Sometimes true   Transportation Needs: No Transportation Needs (3/27/2024)    Received from [facility]    PRAPARE - Transportation      Lack of Transportation (Medical): No      Lack of Transportation (Non-Medical): No   Physical Activity: Insufficiently Active (3/27/2024)    Received from [facility]    Exercise Vital Sign      Days of Exercise per Week: 2 days      Minutes of Exercise per Session: 30 min   Stress: Stress Concern Present (2/26/2024)    [place] [employer] - Occupational Stress Questionnaire      Feeling of Stress : Rather much   Social Connections: Unknown (2/26/2024)    Social Connection and Isolation Panel [NHANES]      Frequency of Communication with Friends and Family: Not on file      Frequency of Social Gatherings with Friends and Family: Once a week      Attends Congregation Services: Not on file      Active Member of Clubs or Organizations: Not on file      Attends Club or Organization Meetings: Not on file      Marital Status: Not on file   Interpersonal Safety: Low Risk  (2/26/2024)    Interpersonal Safety      Do you feel physically and emotionally safe where you currently live?: Yes      Within the past 12 months, have you been hit, slapped, kicked or otherwise physically hurt by someone?: No      Within the past 12 months, have you been humiliated or emotionally abused in other ways by your partner or ex-partner?: No   Housing Stability: Low Risk  (3/27/2024)    Received from [facility]    Housing Stability      What is your living situation today?: I have a steady place to live       FAMILY  HISTORY:  Family History   Problem Relation Age of Onset     Cancer Mother 45        lung     Neurologic Disorder Mother         epilepsy     Lipids Father      Gastrointestinal Disease Father         diverticulitis      Depression Father      Colitis Father      Colon Cancer Father      Diverticulitis Father      Depression Sister      Cancer Maternal Grandmother      Blood Disease Maternal Grandmother         lymphoma      Arthritis Maternal Grandmother      Diabetes Maternal Grandmother      Depression Maternal Grandmother      Macular Degeneration Maternal Grandmother      Glaucoma Maternal Grandmother      Diabetes Maternal Grandfather      Cerebrovascular Disease Maternal Grandfather      Blood Disease Maternal Grandfather      Heart Disease Maternal Grandfather      Glaucoma Maternal Grandfather      Cancer Paternal Grandmother      Cancer - colorectal Paternal Grandmother      Colitis Paternal Grandmother      Colon Cancer Paternal Grandmother      Diverticulitis Paternal Grandmother      Respiratory Paternal Grandfather         emphysema      Colitis Paternal Grandfather      Colon Cancer Paternal Grandfather      Diverticulitis Paternal Grandfather      Heart Disease Daughter      Asthma Daughter      Melanoma No family hx of      Anesthesia Reaction No family hx of      Clotting Disorder No family hx of        Past/family/social history reviewed and no changes        Again, thank you for allowing me to participate in the care of your patient.        Sincerely,        Renard Davey MD    Electronically signed

## 2025-05-01 NOTE — PROGRESS NOTES
Virtual Visit Details    Type of service:  Video Visit     Originating Location (pt. Location): Home    Distant Location (provider location):  On-site  Platform used for Video Visit: Gabriel    Video start: 12:06 PM  Video end: 12:26 PM    IBD CLINIC VISIT     CC/REFERRING MD:  Anna Naidu    REASON FOR CONSULTATION: follow up    ASSESSMENT/PLAN    1. Ulcerative colitis s/p colectomy with IPAA:   Current medications: imodium 2 pills at bedtime  Current clinical disease activity: stable  Last endoscopic disease activity: pouchoscopy 8/2024 - normal pre-pouch ileum and pouch - minimal concentric ulceration at staple line. Biopsies of pre-pouch ileum and pouch unremarkable    Overall doing well but due to insurance not covering imodium she has only been able to take 2 pills per day. Will add fiber (discussed benefiber is tasteless and hopefully less of a texture issue) and also retry Rx for lomotil. She agrees.    -start benefiber 1 tablespoon per day - increase up to 2-3 tablespoons. If this does not help or you cannot tolerate this powder then you can try tablets  -Lomotil 1-2 pills BID.  -Imodium PRN    2. IBD dysplasia surveillance:   due 2029    3. Regurgitation - resolved with avoiding gluten (she has been tested for celiac and does not have celiac.     Return to clinic in 6 months    Thank you for this consultation.  It was a pleasure to participate in the care of this patient; please contact us with any further questions.  I spent a total of 40 minutes during the day of encounter performed chart review, meeting with patient, patient counseling, care coordination, and documentation.    The longitudinal plan of care for the diagnosis(es)/condition(s) as documented were addressed during this visit. Due to the added complexity in care, I will continue to support this patient in the subsequent management and with the ongoing continuity of care    This note was created with voice recognition software, and while  reviewed for accuracy, typos may remain.     Renard Davey MD  Inflammatory Bowel Disease Program   Division of Gastroenterology, Hepatology and Nutrition  [facility]        IBD HISTORY  Age at diagnosis: 2021  Extent of disease: proctosigmoid   Current UC medications: None  Prior UC surgeries:  total abdominal colectomy and end ileostomy 6/15/21   Prior IBD Medications:   Infliximab, 1 dose in the hospital good response  Vedolizumab, completed induction dosing  Prednisone, minimal response to high-dose oral and IV with significant weight gain  5-ASA, no response  Topical therapies to include enemas, no response    DRUG MONITORING  TPMT enzyme activity: --    6-TGN/6-MMPN levels: --    Biologic concentration: --         DISEASE ASSESSMENT    Endoscopic assessment:   EGD 2/14/23 ([place])  Post-op Diagnoses:        - LA Grade A reflux esophagitis.        - Erythematous mucosa in the prepyloric region of the stomach. Biopsied.        - A few fundic gland polyps.        - A single non-bleeding angioectasia in the duodenum (posterior wall).        - Biopsies were taken with a cold forceps for evaluation of celiac        disease.        - No clear cause of abdominal pain identified. No evidence of recurrent        melanoma.      Surgical Path:  Colon path 6/15/23:  FINAL DIAGNOSIS:   COLON, RESECTION:   - Moderate chronic active colitis with crypt abscess formation ([place]ap[place]   grade 3)   - Negative for dysplasia and malignancy   - Viable surgical margins   - Two benign lymph nodes (0/2)      Surgical Pathology (9/5/2023):  A. ILEUM, ILEOSTOMY TRIM;  Ileo-cutaneous anastomotic junction with nonspecific mucosa inflammation, congestion, and other features consistent with ileostomy; no dysplasia or malignancy  B. RECTUM, COMPLETION PROCTECTOMY:  Mildly active chronic proctitis with:   -Neutrophilic cryptitis, mucosal atrophy and prominent reactive lymphoid hyperplasias  -Reactive mesorectal lymph nodes;  no  dysplasia or malignancy  C. COLON, ANASTOMOTIC RINGS; EXCISION:   -Portions of small intestinal mucosa and wall with no morphologic abnormalities   -Portion of rectal mucosa and wall with chronic proctitis; no dysplasia or malignancy      Enterography:   -- MRE 12/27/2022 no findings for acute inflammation of the bowel     Fecal calprotectin: --     C diff: negative 10/26/23, negative 1/12/23,  was positive 3/2021 treated with vanco taper (at [place])     HPI:   Currently, here for routine follow up.     Overall doing well and hanging in there. Having 8-10 BMs per day (much improved) but 5 of these are at night and urgent. Takes imodium 2 pills before bed which helps. But was taking more (4 pills BID). Insurance no longer covers it (had to get new insurance). Cost for over the counter is too much to take that often. So now only taking 2 pills before bed.    She is not taking fiber. Did not like taste and consistency of citrucel. Wonders what else there is.     Regurgitation is better - thinks better since off gluten.    No GERD, dysphgia/odynophagia. No joint pains, rashes, mouth sores    Gunter score:  NA - s/p colectomy    Extra intestinal manifestations of IBD:  No uveitis/episcleritis  No aphthous ulcers   No arthritis   No erythema nodosum/pyoderma gangrenosum.     sIBDQ:  IBDQ Score Date IBDQ - Total Score  (Higher score better)   5/1/2025  11:56 AM 44   3/26/2024   2:01 PM 39   4/10/2018   7:20 AM 32          ROS:    Constitutional, HEENT, cardiovascular, pulmonary, GI, , musculoskeletal, neuro, skin, endocrine and psych systems are negative, except as otherwise noted.    PHYSICAL EXAMINATION:  Constitutional: aaox3, cooperative, pleasant, not dyspneic/diaphoretic, no acute distress  Vitals reviewed: There were no vitals taken for this visit.  Wt:   Wt Readings from Last 2 Encounters:   02/15/25 95.3 kg (210 lb)   01/28/25 95.3 kg (210 lb)      Eyes: Sclera anicteric/injected  Respiratory: Unlabored  breathing  Skin: o jaundice  Psych: Normal affect    PERTINENT PAST MEDICAL HISTORY:  Past Medical History:   Diagnosis Date    Abnormal MRI     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     Anxiety     Basal cell carcinoma     Cervical high risk HPV (human papillomavirus) test positive 2019    See problem list    Colitis     Depression     Diabetes mellitus, iatrogenic (H) 2020    Esophageal reflux     Inflammatory arthritis     Insomnia     Intestinal giardiasis 2018    Lumbago     left lower back pain    Lymphedema     Malignant melanoma (H)     Melanoma (H) 10/23/2017    Migraines     Mild persistent asthma     Morbid obesity (H) 2019    Morbid obesity with BMI of 40.0-44.9, adult (H)     STORMY (obstructive sleep apnea)     Prothrombin deficiency (H)     takes 81mg asa daily    Stroke (cerebrum) (H)     During     TIA (transient ischemic attack)     Type 2 diabetes mellitus (H)     Ulcerative pancolitis (H)        PREVIOUS SURGERIES:  Past Surgical History:   Procedure Laterality Date    APPENDECTOMY      COLONOSCOPY N/A 10/18/2017    Procedure: COLONOSCOPY;  Colon;  Surgeon: Debbie Stephens MD;  [facility]    COLONOSCOPY N/A 2018    Procedure: COMBINED COLONOSCOPY, SINGLE OR MULTIPLE BIOPSY/POLYPECTOMY BY BIOPSY;  colon;  Surgeon: Benita Schumacher MD;  [facility]: [facility]    COLONOSCOPY      multiple since  to present - about 6 total    DAVINCI ASSISTED TRANSANAL TOTAL MESORECTAL EXCISION N/A 2023    Procedure: COMPLETION PROCTECTOMY, ROBOT-ASSISTED, ILEAL POUCH ANASTAMOSIS;  Surgeon: Quinton Ram MD;  [facility]    DAVINCI HERNIORRHAPHY VENTRAL N/A 2024    Procedure: HERNIORRHAPHY, VENTRAL, ROBOT-ASSISTED;  Surgeon: Arcadio Carrillo MD;  [facility]    DISSECT LYMPH NODE AXILLA Left 10/23/2017    Procedure: DISSECT LYMPH NODE AXILLA;  Left Axillary Lymph Node Dissection ;  Surgeon: Laurent Cool MD;   [facility]    ESOPHAGOSCOPY, GASTROSCOPY, DUODENOSCOPY (EGD), COMBINED N/A 2024    Procedure: ESOPHAGOGASTRODUODENOSCOPY, WITH BIOPSY;  Surgeon: Renard Davey MD;  Location:  GI    EXAM UNDER ANESTHESIA PELVIC N/A 2020    Procedure: EXAM UNDER ANESTHESIA, PELVIS; with Cervical Biopsies, Vaginal Biopsy and Endocervical Curettings;  Surgeon: Melina Jung MD;  [facility]    GYN SURGERY  ,         LAPAROSCOPIC ASSISTED COLECTOMY N/A 06/15/2021    Procedure: laparoscopic total abdominal colectomy, end ileostomy;  Surgeon: Quinton Ram MD;  [facility]    POUCHOSCOPY, BIOPSY N/A 2024    Procedure: Pouchoscopy with biopsy;  Surgeon: Renard Davey MD;  Location:  GI    REPAIR MOHS Left 2017    Procedure: REPAIR MOHS;  Left Upper Lid Moh's Reconstruction;  Surgeon: Kisha Bosch MD;  [facility]    SIGMOIDOSCOPY FLEXIBLE N/A 2023    Procedure: Sigmoidoscopy flexible;  Surgeon: Quinton Ram MD;  [facility]    TAKEDOWN ILEOSTOMY N/A 2023    Procedure: CLOSURE, ILEOSTOMY;  Surgeon: Quinton Ram MD;  [facility]       ALLERGIES:     Allergies   Allergen Reactions    Bee Venom Swelling    Azithromycin Diarrhea    Erythromycin      Other reaction(s): GI intolerance, Vomiting    Fentanyl Other (See Comments)     sweating  sweating    Prochlorperazine Fatigue     Other reaction(s): Other (see comments)  Fatigue    Buspirone      Other reaction(s): GI intolerance  vomiting    Erythrocin Nausea and Vomiting    Gluten Meal      Celiac disease    Topamax [Topiramate]      Made her lethargic    Zithromax [Azithromycin Dihydrate] Diarrhea    Enbrel [Etanercept] Hives and Rash       PERTINENT MEDICATIONS:    Current Outpatient Medications:     diphenoxylate-atropine (LOMOTIL) 2.5-0.025 MG tablet, Take 2 tablets by mouth 2 times daily., Disp: 180 tablet, Rfl: 3    acetaminophen (TYLENOL) 325  MG tablet, Take 3 tablets (975 mg) by mouth 3 times daily, Disp: , Rfl:     albuterol (PROAIR HFA/PROVENTIL HFA/VENTOLIN HFA) 108 (90 Base) MCG/ACT inhaler, Inhale 2 puffs into the lungs every 6 hours as needed for shortness of breath, wheezing or cough., Disp: 18 g, Rfl: 0    ARIPiprazole (ABILIFY) 5 MG tablet, Take 1 tablet (5 mg) by mouth daily. ** VISIT DUE/LAST FILL **, Disp: 90 tablet, Rfl: 0    benzonatate (TESSALON) 200 MG capsule, Take 1 capsule (200 mg) by mouth 3 times daily as needed for cough., Disp: 30 capsule, Rfl: 0    diphenoxylate-atropine (LOMOTIL) 2.5-0.025 MG tablet, Take 2 tablets by mouth 2 times daily as needed for diarrhea, Disp: 240 tablet, Rfl: 5    loperamide (IMODIUM) 2 MG capsule, Take 1 capsule (2 mg) by mouth 4 times daily as needed for diarrhea Up to 8/day, Disp: 240 capsule, Rfl: 11    medical cannabis (Patient's own supply), , Disp: , Rfl:     ondansetron (ZOFRAN ODT) 4 MG ODT tab, DISSOLVE ONE TABLET BY MOUTH EVERY 8 HOURS AS NEEDED FOR NAUSEA., Disp: 20 tablet, Rfl: 1    OZEMPIC, 0.25 OR 0.5 MG/DOSE, 2 MG/3ML pen, INJECT 0.5 MG UNDER THE SKIN EVERY 7 DAYS **DUE FOR OFFICE VISIT/LAST FILL**, Disp: 3 mL, Rfl: 0    venlafaxine (EFFEXOR) 75 MG tablet, TAKE 2 TABLETS BY MOUTH 2 TIMES DAILY. ** VISIT DUE/LAST FILL **, Disp: 120 tablet, Rfl: 0  No current facility-administered medications for this visit.    Facility-Administered Medications Ordered in Other Visits:     lidocaine 1 % 9 mL, 9 mL, Intradermal, Once, Anna Naidu MD    sodium bicarbonate 8.4 % injection 1 mEq, 1 mEq, Intradermal, Once, Anna Naidu MD    SOCIAL HISTORY:  Social History     Socioeconomic History    Marital status:      Spouse name: Not on file    Number of children: 2    Years of education: Not on file    Highest education level: Not on file   Occupational History    Occupation: on short term disability currently - is an    Tobacco Use    Smoking status: Former      "Current packs/day: 0.00     Average packs/day: 1 pack/day for 5.0 years (5.0 ttl pk-yrs)     Types: Cigarettes     Start date: 3/20/1993     Quit date: 3/20/1998     Years since quittin.1     Passive exposure: Past    Smokeless tobacco: Never   Vaping Use    Vaping status: Never Used   Substance and Sexual Activity    Alcohol use: Not Currently    Drug use: Yes     Types: Marijuana     Comment: vape, edible, weekly    Sexual activity: Not Currently     Partners: Male     Birth control/protection: Surgical   Other Topics Concern    Parent/sibling w/ CABG, MI or angioplasty before 65F 55M? No   Social History Narrative    20 y.o- patient's mother   of lung cancer. She had to take care of her younger sister.    2012- patient's  had a heart attack with stents placed, followed by cardiac rehabilitation    2000 TO 2012  was in [facility] for depression    2013 patient's  went through alcohol rehabilitation at [facility] inpatient            They have attended couple counseling a couple of times and patient went to the family program for chemical dependency.    Patient denies alcohol or drug use and herself            Has 2 children, girls ages 17 and 14. Oldest has been a \"trouble maker.\"     For a while she was working 3 jobs since her  was ill. Works at the [employer] for [employer]. Reports her job is very stressful.         Social Drivers of Health     Financial Resource Strain: Unknown (2024)    Financial Resource Strain     Within the past 12 months, have you or your family members you live with been unable to get utilities (heat, electricity) when it was really needed?: Patient declined   Food Insecurity: Food Insecurity Present (3/27/2024)    Received [facility], [facility]    Hunger Vital Sign     Worried About Running Out of Food in the Last Year: Sometimes true     Ran Out of Food in the Last " Year: Sometimes true   Transportation Needs: No Transportation Needs (3/27/2024)    Received from [facility]    PRAPARE - Transportation     Lack of Transportation (Medical): No     Lack of Transportation (Non-Medical): No   Physical Activity: Insufficiently Active (3/27/2024)    Received from [facility]    Exercise Vital Sign     Days of Exercise per Week: 2 days     Minutes of Exercise per Session: 30 min   Stress: Stress Concern Present (2/26/2024)    [employer] [place] [employer] - Occupational Stress Questionnaire     Feeling of Stress : Rather much   Social Connections: Unknown (2/26/2024)    Social Connection and Isolation Panel [NHANES]     Frequency of Communication with Friends and Family: Not on file     Frequency of Social Gatherings with Friends and Family: Once a week     Attends Taoist Services: Not on file     Active Member of Clubs or Organizations: Not on file     Attends Club or Organization Meetings: Not on file     Marital Status: Not on file   Interpersonal Safety: Low Risk  (2/26/2024)    Interpersonal Safety     Do you feel physically and emotionally safe where you currently live?: Yes     Within the past 12 months, have you been hit, slapped, kicked or otherwise physically hurt by someone?: No     Within the past 12 months, have you been humiliated or emotionally abused in other ways by your partner or ex-partner?: No   Housing Stability: Low Risk  (3/27/2024)    Received from [facility]    Housing Stability     What is your living situation today?: I have a steady place to live       FAMILY HISTORY:  Family History   Problem Relation Age of Onset    Cancer Mother 45        lung    Neurologic Disorder Mother         epilepsy    Lipids Father     Gastrointestinal Disease Father         diverticulitis     Depression Father     Colitis Father     Colon Cancer Father     Diverticulitis Father     Depression Sister     Cancer Maternal  Grandmother     Blood Disease Maternal Grandmother         lymphoma     Arthritis Maternal Grandmother     Diabetes Maternal Grandmother     Depression Maternal Grandmother     Macular Degeneration Maternal Grandmother     Glaucoma Maternal Grandmother     Diabetes Maternal Grandfather     Cerebrovascular Disease Maternal Grandfather     Blood Disease Maternal Grandfather     Heart Disease Maternal Grandfather     Glaucoma Maternal Grandfather     Cancer Paternal Grandmother     Cancer - colorectal Paternal Grandmother     Colitis Paternal Grandmother     Colon Cancer Paternal Grandmother     Diverticulitis Paternal Grandmother     Respiratory Paternal Grandfather         emphysema     Colitis Paternal Grandfather     Colon Cancer Paternal Grandfather     Diverticulitis Paternal Grandfather     Heart Disease Daughter     Asthma Daughter     Melanoma No family hx of     Anesthesia Reaction No family hx of     Clotting Disorder No family hx of        Past/family/social history reviewed and no changes

## 2025-05-07 ENCOUNTER — TELEPHONE (OUTPATIENT)
Dept: GASTROENTEROLOGY | Facility: CLINIC | Age: 50
End: 2025-05-07
Payer: COMMERCIAL

## 2025-05-07 NOTE — TELEPHONE ENCOUNTER
Left Voicemail (1st Attempt) and Sent Mychart (1st Attempt) for the patient to call back and schedule the following:    Appointment type: Return - 1 yr   Provider: Dr. Davey  Return date: ~ 5/1/25  Specialty phone number: [phone]  Additional appointment(s) needed: 6 M - w/ IBD ASHANTI   Additonal Notes:     Follow up per provider req - 5/7 AA

## 2025-05-08 DIAGNOSIS — F32.9 CURRENT EPISODE OF MAJOR DEPRESSIVE DISORDER WITHOUT PRIOR EPISODE, UNSPECIFIED DEPRESSION EPISODE SEVERITY: ICD-10-CM

## 2025-05-08 DIAGNOSIS — F32.2 CURRENT SEVERE EPISODE OF MAJOR DEPRESSIVE DISORDER WITHOUT PSYCHOTIC FEATURES WITHOUT PRIOR EPISODE (H): ICD-10-CM

## 2025-05-08 RX ORDER — VENLAFAXINE 75 MG/1
TABLET ORAL
Qty: 120 TABLET | Refills: 0 | Status: SHIPPED | OUTPATIENT
Start: 2025-05-08

## 2025-05-08 RX ORDER — ARIPIPRAZOLE 5 MG/1
TABLET ORAL
Qty: 30 TABLET | Refills: 0 | Status: SHIPPED | OUTPATIENT
Start: 2025-05-08

## 2025-06-07 ENCOUNTER — TELEPHONE (OUTPATIENT)
Dept: FAMILY MEDICINE | Facility: CLINIC | Age: 50
End: 2025-06-07
Payer: COMMERCIAL

## 2025-06-07 DIAGNOSIS — F32.2 CURRENT SEVERE EPISODE OF MAJOR DEPRESSIVE DISORDER WITHOUT PSYCHOTIC FEATURES WITHOUT PRIOR EPISODE (H): ICD-10-CM

## 2025-06-07 NOTE — LETTER
[facility]  [address]  [place] [address]  [phone]  Valeri 10, 2025    Doretha Yu  [address] [facility] [address]    Dear Doretha,    We care about your health and have reviewed your health plan including your medical conditions, medication list, and lab results.  Based on this review, it is recommended that you follow up regarding the following health topic(s):     -Wellness (Physical) Visit & Appointment needed for medication refills    Please call us at [phone] (or use Vyatta) to address the above recommendations.     Thank you for trusting [facility] and we appreciate the opportunity to serve you.  We look forward to supporting your healthcare needs in the future.    Healthy Regards,      Your Health Care Team  [facility]

## 2025-06-09 NOTE — TELEPHONE ENCOUNTER
Last two prescriptions have said  ** VISIT DUE/LAST FILL **    I do not see follow up scheduled.     Please CALL patient, schedule follow up apt and then a 1 month refill will be granted.    Anna Naidu M.D.

## 2025-06-09 NOTE — TELEPHONE ENCOUNTER
Left voicemail for patient that she is due for a visit prior to refill and to call the care team to schedule.    Please call again.    Annamarie GAO LPN

## 2025-06-11 RX ORDER — ARIPIPRAZOLE 5 MG/1
TABLET ORAL
Qty: 15 TABLET | Refills: 0 | Status: SHIPPED | OUTPATIENT
Start: 2025-06-11

## 2025-06-11 NOTE — TELEPHONE ENCOUNTER
#15 pills sent to pharmacy to avoid disruption in treatment.  This will be the last refill without an apt scheduled.    Anna Naidu M.D.

## 2025-06-12 DIAGNOSIS — F32.9 CURRENT EPISODE OF MAJOR DEPRESSIVE DISORDER WITHOUT PRIOR EPISODE, UNSPECIFIED DEPRESSION EPISODE SEVERITY: ICD-10-CM

## 2025-06-12 RX ORDER — VENLAFAXINE 75 MG/1
150 TABLET ORAL 2 TIMES DAILY
Qty: 120 TABLET | Refills: 0 | Status: SHIPPED | OUTPATIENT
Start: 2025-06-12 | End: 2025-06-24

## 2025-06-21 ENCOUNTER — HEALTH MAINTENANCE LETTER (OUTPATIENT)
Age: 50
End: 2025-06-21

## 2025-06-24 ENCOUNTER — TELEPHONE (OUTPATIENT)
Dept: FAMILY MEDICINE | Facility: CLINIC | Age: 50
End: 2025-06-24

## 2025-06-24 ENCOUNTER — OFFICE VISIT (OUTPATIENT)
Dept: FAMILY MEDICINE | Facility: CLINIC | Age: 50
End: 2025-06-24
Payer: COMMERCIAL

## 2025-06-24 VITALS
OXYGEN SATURATION: 97 % | SYSTOLIC BLOOD PRESSURE: 118 MMHG | HEIGHT: 63 IN | TEMPERATURE: 98.6 F | HEART RATE: 76 BPM | DIASTOLIC BLOOD PRESSURE: 70 MMHG | RESPIRATION RATE: 16 BRPM | WEIGHT: 213.13 LBS | BODY MASS INDEX: 37.76 KG/M2

## 2025-06-24 DIAGNOSIS — E66.812 CLASS 2 SEVERE OBESITY WITH BODY MASS INDEX (BMI) OF 35 TO 39.9 WITH SERIOUS COMORBIDITY (H): ICD-10-CM

## 2025-06-24 DIAGNOSIS — E11.9 TYPE 2 DIABETES MELLITUS WITHOUT COMPLICATION, WITHOUT LONG-TERM CURRENT USE OF INSULIN (H): ICD-10-CM

## 2025-06-24 DIAGNOSIS — Z12.31 VISIT FOR SCREENING MAMMOGRAM: ICD-10-CM

## 2025-06-24 DIAGNOSIS — Z00.00 ROUTINE GENERAL MEDICAL EXAMINATION AT A HEALTH CARE FACILITY: Primary | ICD-10-CM

## 2025-06-24 DIAGNOSIS — F32.2 CURRENT SEVERE EPISODE OF MAJOR DEPRESSIVE DISORDER WITHOUT PSYCHOTIC FEATURES WITHOUT PRIOR EPISODE (H): ICD-10-CM

## 2025-06-24 DIAGNOSIS — E66.01 CLASS 2 SEVERE OBESITY WITH BODY MASS INDEX (BMI) OF 35 TO 39.9 WITH SERIOUS COMORBIDITY (H): ICD-10-CM

## 2025-06-24 DIAGNOSIS — E78.5 HYPERLIPIDEMIA LDL GOAL <100: ICD-10-CM

## 2025-06-24 PROBLEM — R19.8 HIGH OUTPUT ILEOSTOMY (H): Status: RESOLVED | Noted: 2023-10-30 | Resolved: 2025-06-24

## 2025-06-24 PROBLEM — M06.9 ARTHRITIS, RHEUMATOID (H): Status: RESOLVED | Noted: 2018-11-26 | Resolved: 2025-06-24

## 2025-06-24 PROBLEM — Z93.2 HIGH OUTPUT ILEOSTOMY (H): Status: RESOLVED | Noted: 2023-10-30 | Resolved: 2025-06-24

## 2025-06-24 LAB
ALBUMIN SERPL BCG-MCNC: 4.2 G/DL (ref 3.5–5.2)
ALP SERPL-CCNC: 82 U/L (ref 40–150)
ALT SERPL W P-5'-P-CCNC: 23 U/L (ref 0–50)
ANION GAP SERPL CALCULATED.3IONS-SCNC: 14 MMOL/L (ref 7–15)
AST SERPL W P-5'-P-CCNC: 26 U/L (ref 0–45)
BILIRUB SERPL-MCNC: 0.2 MG/DL
BUN SERPL-MCNC: 10.4 MG/DL (ref 6–20)
CALCIUM SERPL-MCNC: 9.8 MG/DL (ref 8.8–10.4)
CHLORIDE SERPL-SCNC: 102 MMOL/L (ref 98–107)
CHOLEST SERPL-MCNC: 210 MG/DL
CREAT SERPL-MCNC: 0.84 MG/DL (ref 0.51–0.95)
CREAT UR-MCNC: 82.9 MG/DL
EGFRCR SERPLBLD CKD-EPI 2021: 85 ML/MIN/1.73M2
EST. AVERAGE GLUCOSE BLD GHB EST-MCNC: 123 MG/DL
FASTING STATUS PATIENT QL REPORTED: YES
FASTING STATUS PATIENT QL REPORTED: YES
GLUCOSE SERPL-MCNC: 92 MG/DL (ref 70–99)
HBA1C MFR BLD: 5.9 % (ref 0–5.6)
HCO3 SERPL-SCNC: 21 MMOL/L (ref 22–29)
HDLC SERPL-MCNC: 44 MG/DL
LDLC SERPL CALC-MCNC: 127 MG/DL
MICROALBUMIN UR-MCNC: <12 MG/L
MICROALBUMIN/CREAT UR: NORMAL MG/G{CREAT}
NONHDLC SERPL-MCNC: 166 MG/DL
POTASSIUM SERPL-SCNC: 4.4 MMOL/L (ref 3.4–5.3)
PROT SERPL-MCNC: 7 G/DL (ref 6.4–8.3)
SODIUM SERPL-SCNC: 137 MMOL/L (ref 135–145)
TRIGL SERPL-MCNC: 194 MG/DL

## 2025-06-24 PROCEDURE — 3044F HG A1C LEVEL LT 7.0%: CPT | Performed by: FAMILY MEDICINE

## 2025-06-24 PROCEDURE — 80053 COMPREHEN METABOLIC PANEL: CPT | Performed by: FAMILY MEDICINE

## 2025-06-24 PROCEDURE — 80061 LIPID PANEL: CPT | Performed by: FAMILY MEDICINE

## 2025-06-24 PROCEDURE — 82570 ASSAY OF URINE CREATININE: CPT | Performed by: FAMILY MEDICINE

## 2025-06-24 PROCEDURE — 96127 BRIEF EMOTIONAL/BEHAV ASSMT: CPT | Performed by: FAMILY MEDICINE

## 2025-06-24 PROCEDURE — 82043 UR ALBUMIN QUANTITATIVE: CPT | Performed by: FAMILY MEDICINE

## 2025-06-24 PROCEDURE — 99396 PREV VISIT EST AGE 40-64: CPT | Mod: 25 | Performed by: FAMILY MEDICINE

## 2025-06-24 PROCEDURE — 1126F AMNT PAIN NOTED NONE PRSNT: CPT | Performed by: FAMILY MEDICINE

## 2025-06-24 PROCEDURE — 36415 COLL VENOUS BLD VENIPUNCTURE: CPT | Performed by: FAMILY MEDICINE

## 2025-06-24 PROCEDURE — 99214 OFFICE O/P EST MOD 30 MIN: CPT | Mod: 25 | Performed by: FAMILY MEDICINE

## 2025-06-24 PROCEDURE — 3074F SYST BP LT 130 MM HG: CPT | Performed by: FAMILY MEDICINE

## 2025-06-24 PROCEDURE — 83036 HEMOGLOBIN GLYCOSYLATED A1C: CPT | Performed by: FAMILY MEDICINE

## 2025-06-24 PROCEDURE — 3078F DIAST BP <80 MM HG: CPT | Performed by: FAMILY MEDICINE

## 2025-06-24 RX ORDER — ARIPIPRAZOLE 2 MG/1
2 TABLET ORAL DAILY
Qty: 90 TABLET | Refills: 1 | Status: SHIPPED | OUTPATIENT
Start: 2025-06-24

## 2025-06-24 RX ORDER — VENLAFAXINE 75 MG/1
150 TABLET ORAL 2 TIMES DAILY
Qty: 360 TABLET | Refills: 3 | Status: SHIPPED | OUTPATIENT
Start: 2025-06-24

## 2025-06-24 RX ORDER — SEMAGLUTIDE 0.68 MG/ML
0.25 INJECTION, SOLUTION SUBCUTANEOUS
Qty: 3 ML | Refills: 0 | Status: SHIPPED | OUTPATIENT
Start: 2025-06-24

## 2025-06-24 RX ORDER — ARIPIPRAZOLE 5 MG/1
5 TABLET ORAL DAILY
Qty: 90 TABLET | Refills: 1 | Status: CANCELLED | OUTPATIENT
Start: 2025-06-24

## 2025-06-24 SDOH — HEALTH STABILITY: PHYSICAL HEALTH: ON AVERAGE, HOW MANY MINUTES DO YOU ENGAGE IN EXERCISE AT THIS LEVEL?: 0 MIN

## 2025-06-24 SDOH — HEALTH STABILITY: PHYSICAL HEALTH: ON AVERAGE, HOW MANY DAYS PER WEEK DO YOU ENGAGE IN MODERATE TO STRENUOUS EXERCISE (LIKE A BRISK WALK)?: 0 DAYS

## 2025-06-24 ASSESSMENT — SOCIAL DETERMINANTS OF HEALTH (SDOH): HOW OFTEN DO YOU GET TOGETHER WITH FRIENDS OR RELATIVES?: ONCE A WEEK

## 2025-06-24 ASSESSMENT — ANXIETY QUESTIONNAIRES
4. TROUBLE RELAXING: SEVERAL DAYS
GAD7 TOTAL SCORE: 5
6. BECOMING EASILY ANNOYED OR IRRITABLE: SEVERAL DAYS
5. BEING SO RESTLESS THAT IT IS HARD TO SIT STILL: NOT AT ALL
IF YOU CHECKED OFF ANY PROBLEMS ON THIS QUESTIONNAIRE, HOW DIFFICULT HAVE THESE PROBLEMS MADE IT FOR YOU TO DO YOUR WORK, TAKE CARE OF THINGS AT HOME, OR GET ALONG WITH OTHER PEOPLE: NOT DIFFICULT AT ALL
GAD7 TOTAL SCORE: 5
8. IF YOU CHECKED OFF ANY PROBLEMS, HOW DIFFICULT HAVE THESE MADE IT FOR YOU TO DO YOUR WORK, TAKE CARE OF THINGS AT HOME, OR GET ALONG WITH OTHER PEOPLE?: NOT DIFFICULT AT ALL
7. FEELING AFRAID AS IF SOMETHING AWFUL MIGHT HAPPEN: NOT AT ALL
7. FEELING AFRAID AS IF SOMETHING AWFUL MIGHT HAPPEN: NOT AT ALL
1. FEELING NERVOUS, ANXIOUS, OR ON EDGE: SEVERAL DAYS
GAD7 TOTAL SCORE: 5
2. NOT BEING ABLE TO STOP OR CONTROL WORRYING: SEVERAL DAYS
3. WORRYING TOO MUCH ABOUT DIFFERENT THINGS: SEVERAL DAYS

## 2025-06-24 ASSESSMENT — PAIN SCALES - GENERAL: PAINLEVEL_OUTOF10: NO PAIN (0)

## 2025-06-24 ASSESSMENT — ASTHMA QUESTIONNAIRES
QUESTION_1 LAST FOUR WEEKS HOW MUCH OF THE TIME DID YOUR ASTHMA KEEP YOU FROM GETTING AS MUCH DONE AT WORK, SCHOOL OR AT HOME: NONE OF THE TIME
QUESTION_2 LAST FOUR WEEKS HOW OFTEN HAVE YOU HAD SHORTNESS OF BREATH: ONCE OR TWICE A WEEK
ACT_TOTALSCORE: 24
QUESTION_5 LAST FOUR WEEKS HOW WOULD YOU RATE YOUR ASTHMA CONTROL: COMPLETELY CONTROLLED
QUESTION_4 LAST FOUR WEEKS HOW OFTEN HAVE YOU USED YOUR RESCUE INHALER OR NEBULIZER MEDICATION (SUCH AS ALBUTEROL): NOT AT ALL
QUESTION_3 LAST FOUR WEEKS HOW OFTEN DID YOUR ASTHMA SYMPTOMS (WHEEZING, COUGHING, SHORTNESS OF BREATH, CHEST TIGHTNESS OR PAIN) WAKE YOU UP AT NIGHT OR EARLIER THAN USUAL IN THE MORNING: NOT AT ALL

## 2025-06-24 ASSESSMENT — PATIENT HEALTH QUESTIONNAIRE - PHQ9
SUM OF ALL RESPONSES TO PHQ QUESTIONS 1-9: 8
10. IF YOU CHECKED OFF ANY PROBLEMS, HOW DIFFICULT HAVE THESE PROBLEMS MADE IT FOR YOU TO DO YOUR WORK, TAKE CARE OF THINGS AT HOME, OR GET ALONG WITH OTHER PEOPLE: SOMEWHAT DIFFICULT
SUM OF ALL RESPONSES TO PHQ QUESTIONS 1-9: 8

## 2025-06-24 NOTE — TELEPHONE ENCOUNTER
Pa req for ozempic 0.25 or 0.5 mg dose    Prior Authorization Retail Medication Request    Medication/Dose:   Diagnosis and ICD code (if different than what is on RX):  cindy  New/renewal/insurance change PA/secondary ins. PA:  Previously Tried and Failed:  cindy  Rationale:  na    Insurance   Primary: [employer]  Insurance ID:  [id number]    Secondary (if applicable):cindy  Insurance ID:  cindy    Pharmacy Information (if different than what is on RX)  Name:  [facility]  Phone:  [phone]  Fax:[phone]    Clinic Information  Preferred routing pool for dept communication: cindy

## 2025-06-24 NOTE — PROGRESS NOTES
"Preventive Care Visit  [facility]  Anna Naidu MD, Family Medicine  Jun 24, 2025      Assessment & Plan     Routine general medical examination at a health care facility       Visit for screening mammogram     - MA Screening Bilateral w/ Juwan; Future    Hyperlipidemia LDL goal <100  Discussed that a statin may be needed to get LDL <100 - would then recommend a trial of rosuvastatin 5 mg 3x/week to see if tolerated.      - Lipid panel reflex to direct LDL Fasting; Future  - Lipid panel reflex to direct LDL Fasting    Current severe episode of major depressive disorder without psychotic features without prior episode (H)  Would like to see if we can decrease her abilify - has some motivation/energy issues.  Doesn't fel like she's in the same place mentally as she had been when the abilify was started. Will try lowering dose from 5 mg to 2 mg and keep an eye on symptoms. May need to further titrate to get off of this if able     Type 2 diabetes mellitus without complication, without long-term current use of insulin (H)  Restart ozempic.  I do feel she would benefit from further weight loss and metabolic improvement.  She agrees.  Didn't get beyond the 0.5 mg dose, but we will titrate up as able.     - semaglutide (OZEMPIC, 0.25 OR 0.5 MG/DOSE,) 2 MG/3ML pen; Inject 0.25 mg subcutaneously every 7 days.  - semaglutide (OZEMPIC) 2 MG/3ML pen; Inject 0.5 mg subcutaneously every 7 days.    Class 2 severe obesity with body mass index (BMI) of 35 to 39.9 with serious comorbidity (H)  Continue to work on weight loss, exercise  This contributes to risk with DM, hyperlipidemia, etc.     Patient has been advised of split billing requirements and indicates understanding: Yes        BMI  Estimated body mass index is 37.75 kg/m  as calculated from the following:    Height as of this encounter: 1.6 m (5' 3\").    Weight as of this encounter: 96.7 kg (213 lb 2 oz).   Weight management plan: Discussed " healthy diet and exercise guidelines  Reviewed preventive health counseling, as reflected in patient instructions       Regular exercise       Healthy diet/nutrition  Counseling  Appropriate preventive services were addressed with this patient via screening, questionnaire, or discussion as appropriate for fall prevention, nutrition, physical activity, Tobacco-use cessation, social engagement, weight loss and cognition.  Checklist reviewing preventive services available has been given to the patient.  Reviewed patient's diet, addressing concerns and/or questions.   She is at risk for psychosocial distress and has been provided with information to reduce risk.             Subjective   Doretha is a 50 year old, presenting for the following:  Physical        6/24/2025    10:48 AM   Additional Questions   Roomed by Ruth Ann raymond CMA   Accompanied by Self          HPI       Diabetes Follow-up  Ozempic 0.5mg SubQ weekly - she notes not using this regularly  How often are you checking your blood sugar? Not at all  What concerns do you have today about your diabetes? None   Do you have any of these symptoms? (Select all that apply)  Numbness in feet and Burning in feet  Have you had a diabetic eye exam in the last 12 months? No              BP Readings from Last 2 Encounters:   06/24/25 118/70   02/15/25 117/80     Hemoglobin A1C (%)   Date Value   06/24/2025 5.9 (H)   06/10/2024 5.6   06/16/2021 7.5 (H)   05/11/2021 6.5 (H)     LDL Cholesterol Calculated (mg/dL)   Date Value   07/15/2024 116 (H)   07/11/2023 105 (H)   04/05/2021 118 (H)   06/04/2020     Cannot estimate LDL when triglyceride exceeds 400 mg/dL     LDL Cholesterol Direct (mg/dL)   Date Value   06/04/2020 132 (H)         Depression   Abilify 5mg every day, effexor 150mg bid - discuss stopping apbilify  How are you doing with your depression since your last visit? No change  Are you having other symptoms that might be associated with depression? Yes:  fatigue  Have you  had a significant life event?  No   Are you feeling anxious or having panic attacks?   No  Do you have any concerns with your use of alcohol or other drugs? No    Social History     Tobacco Use    Smoking status: Former     Current packs/day: 0.00     Average packs/day: 1 pack/day for 5.0 years (5.0 ttl pk-yrs)     Types: Cigarettes     Start date: 3/20/1993     Quit date: 3/20/1998     Years since quittin.2     Passive exposure: Past    Smokeless tobacco: Never   Vaping Use    Vaping status: Never Used   Substance Use Topics    Alcohol use: Not Currently    Drug use: Yes     Types: Marijuana     Comment: vape, edible, weekly         2024     2:18 PM 2025    12:01 PM 2025    10:47 AM   PHQ   PHQ-9 Total Score 17  8    Q9: Thoughts of better off dead/self-harm past 2 weeks Not at all Not at all Not at all       Patient-reported         10/31/2019    10:07 AM 2024     2:18 PM 2025    11:07 AM   SANDRA-7 SCORE   Total Score 19 (severe anxiety)   5 (mild anxiety)   Total Score 19 5 5        Patient-reported    Proxy-reported         2025    10:47 AM   Last PHQ-9   1.  Little interest or pleasure in doing things 1   2.  Feeling down, depressed, or hopeless 1   3.  Trouble falling or staying asleep, or sleeping too much 1   4.  Feeling tired or having little energy 3   5.  Poor appetite or overeating 1   6.  Feeling bad about yourself 0   7.  Trouble concentrating 1   8.  Moving slowly or restless 0   Q9: Thoughts of better off dead/self-harm past 2 weeks 0   PHQ-9 Total Score 8        Patient-reported         2025    11:07 AM   SANDRA-7    1. Feeling nervous, anxious, or on edge 1   2. Not being able to stop or control worrying 1   3. Worrying too much about different things 1   4. Trouble relaxing 1   5. Being so restless that it is hard to sit still 0   6. Becoming easily annoyed or irritable 1   7. Feeling afraid, as if something awful might happen 0   SANDRA-7 Total Score 5    If you  checked any problems, how difficult have they made it for you to do your work, take care of things at home, or get along with other people? Not difficult at all       Patient-reported       Suicide Assessment Five-step Evaluation and Treatment (SAFE-T)      Advance Care Planning    Discussed advance care planning with patient; however, patient declined at this time.        6/24/2025   General Health   How would you rate your overall physical health? (!) FAIR   Feel stress (tense, anxious, or unable to sleep) To some extent   (!) STRESS CONCERN      6/24/2025   Nutrition   Three or more servings of calcium each day? Yes   Diet: Regular (no restrictions)   How many servings of fruit and vegetables per day? (!) 2-3   How many sweetened beverages each day? 0-1         6/24/2025   Exercise   Days per week of moderate/strenous exercise 0 days   Average minutes spent exercising at this level 0 min   (!) EXERCISE CONCERN      6/24/2025   Social Factors   Frequency of gathering with friends or relatives Once a week   Worry food won't last until get money to buy more No   Food not last or not have enough money for food? No   Do you have housing? (Housing is defined as stable permanent housing and does not include staying outside in a car, in a tent, in an abandoned building, in an overnight shelter, or couch-surfing.) No   Are you worried about losing your housing? No   Lack of transportation? No   Unable to get utilities (heat,electricity)? No   Want help with housing or utility concern? No   (!) HOUSING CONCERN PRESENT      6/24/2025   Dental   Dentist two times every year? Yes       Today's PHQ-9 Score:       6/24/2025    10:47 AM   PHQ-9 SCORE   PHQ-9 Total Score MyChart 8 (Mild depression)   PHQ-9 Total Score 8        Patient-reported         6/24/2025   Substance Use   Alcohol more than 3/day or more than 7/wk Not Applicable   Do you use any other substances recreationally? No     Social History     Tobacco Use     Smoking status: Former     Current packs/day: 0.00     Average packs/day: 1 pack/day for 5.0 years (5.0 ttl pk-yrs)     Types: Cigarettes     Start date: 3/20/1993     Quit date: 3/20/1998     Years since quittin.2     Passive exposure: Past    Smokeless tobacco: Never   Vaping Use    Vaping status: Never Used   Substance Use Topics    Alcohol use: Not Currently    Drug use: Yes     Types: Marijuana     Comment: vape, edible, weekly           2022   LAST FHS-7 RESULTS   1st degree relative breast or ovarian cancer No   Any relative bilateral breast cancer No   Any male have breast cancer No   Any ONE woman have BOTH breast AND ovarian cancer No   Any woman with breast cancer before 50yrs No   2 or more relatives with breast AND/OR ovarian cancer No   2 or more relatives with breast AND/OR bowel cancer No        Mammogram Screening - Mammogram every 1-2 years updated in Health Maintenance based on mutual decision making        2025   STI Screening   New sexual partner(s) since last STI/HIV test? No         Latest Ref Rng & Units 2024     3:24 PM 2021    10:22 AM 2019    11:50 AM   PAP / HPV   PAP  Negative for Intraepithelial Lesion or Malignancy (NILM)  Negative for Intraepithelial Lesion or Malignancy (NILM)     HPV 16 DNA Negative Negative  Negative  Positive    HPV 18 DNA Negative Negative  Negative  Negative    Other HR HPV Negative Negative  Negative  Negative      ASCVD Risk   The ASCVD Risk score (Cooper CANTRELL, et al., 2019) failed to calculate for the following reasons:    Risk score cannot be calculated because patient has a medical history suggesting prior/existing ASCVD        2025   Contraception/Family Planning   Questions about contraception or family planning No   What are your periods like? Regular        Reviewed and updated as needed this visit by Provider   Tobacco  Allergies  Meds  Problems  Med Hx  Surg Hx  Fam Hx                  Review of  "Systems  Constitutional, HEENT, cardiovascular, pulmonary, gi and gu systems are negative, except as otherwise noted.     Objective    Exam  /70   Pulse 76   Temp 98.6  F (37  C) (Tympanic)   Resp 16   Ht 1.6 m (5' 3\")   Wt 96.7 kg (213 lb 2 oz)   LMP 06/17/2025 (Approximate)   SpO2 97%   BMI 37.75 kg/m     Estimated body mass index is 37.75 kg/m  as calculated from the following:    Height as of this encounter: 1.6 m (5' 3\").    Weight as of this encounter: 96.7 kg (213 lb 2 oz).    Physical Exam  GENERAL: alert and no distress  NECK: no adenopathy, no asymmetry, masses, or scars  RESP: lungs clear to auscultation - no rales, rhonchi or wheezes  BREAST: normal without masses, tenderness or nipple discharge and no palpable axillary masses or adenopathy  CV: regular rate and rhythm, normal S1 S2, no S3 or S4, no murmur, click or rub, no peripheral edema  ABDOMEN: soft, nontender, no hepatosplenomegaly, no masses and bowel sounds normal  MS: no gross musculoskeletal defects noted, no edema  SKIN: no suspicious lesions or rashes  NEURO: Normal strength and tone, mentation intact and speech normal  PSYCH: mentation appears normal, affect normal/bright        Signed Electronically by: Anna Naidu MD    "

## 2025-06-24 NOTE — PATIENT INSTRUCTIONS
Patient Education   Preventive Care Advice   This is general advice given by our system to help you stay healthy. However, your care team may have specific advice just for you. Please talk to your care team about your preventive care needs.  Nutrition  Eat 5 or more servings of fruits and vegetables each day.  Try wheat bread, brown rice and whole grain pasta (instead of white bread, rice, and pasta).  Get enough calcium and vitamin D. Check the label on foods and aim for 100% of the RDA (recommended daily allowance).  Lifestyle  Exercise at least 150 minutes each week  (30 minutes a day, 5 days a week).  Do muscle strengthening activities 2 days a week. These help control your weight and prevent disease.  No smoking.  Wear sunscreen to prevent skin cancer.  Have a dental exam and cleaning every 6 months.  Yearly exams  See your health care team every year to talk about:  Any changes in your health.  Any medicines your care team has prescribed.  Preventive care, family planning, and ways to prevent chronic diseases.  Shots (vaccines)   HPV shots (up to age 26), if you've never had them before.  Hepatitis B shots (up to age 59), if you've never had them before.  COVID-19 shot: Get this shot when it's due.  Flu shot: Get a flu shot every year.  Tetanus shot: Get a tetanus shot every 10 years.  Pneumococcal, hepatitis A, and RSV shots: Ask your care team if you need these based on your risk.  Shingles shot (for age 50 and up)  General health tests  Diabetes screening:  Starting at age 35, Get screened for diabetes at least every 3 years.  If you are younger than age 35, ask your care team if you should be screened for diabetes.  Cholesterol test: At age 39, start having a cholesterol test every 5 years, or more often if advised.  Bone density scan (DEXA): At age 50, ask your care team if you should have this scan for osteoporosis (brittle bones).  Hepatitis C: Get tested at least once in your life.  STIs (sexually  transmitted infections)  Before age 24: Ask your care team if you should be screened for STIs.  After age 24: Get screened for STIs if you're at risk. You are at risk for STIs (including HIV) if:  You are sexually active with more than one person.  You don't use condoms every time.  You or a partner was diagnosed with a sexually transmitted infection.  If you are at risk for HIV, ask about PrEP medicine to prevent HIV.  Get tested for HIV at least once in your life, whether you are at risk for HIV or not.  Cancer screening tests  Cervical cancer screening: If you have a cervix, begin getting regular cervical cancer screening tests starting at age 21.  Breast cancer scan (mammogram): If you've ever had breasts, begin having regular mammograms starting at age 40. This is a scan to check for breast cancer.  Colon cancer screening: It is important to start screening for colon cancer at age 45.  Have a colonoscopy test every 10 years (or more often if you're at risk) Or, ask your provider about stool tests like a FIT test every year or Cologuard test every 3 years.  To learn more about your testing options, visit:   .  For help making a decision, visit:   https://bit.ly/kc68498.  Prostate cancer screening test: If you have a prostate, ask your care team if a prostate cancer screening test (PSA) at age 55 is right for you.  Lung cancer screening: If you are a current or former smoker ages 50 to 80, ask your care team if ongoing lung cancer screenings are right for you.  For informational purposes only. Not to replace the advice of your health care provider. Copyright   2023 [employer]. All rights reserved. Clinically reviewed by the [facility] Transitions Program. Talkable [id number] - REV 01/24.

## 2025-06-25 ENCOUNTER — RESULTS FOLLOW-UP (OUTPATIENT)
Dept: FAMILY MEDICINE | Facility: CLINIC | Age: 50
End: 2025-06-25

## 2025-06-25 ENCOUNTER — PATIENT OUTREACH (OUTPATIENT)
Dept: CARE COORDINATION | Facility: CLINIC | Age: 50
End: 2025-06-25
Payer: COMMERCIAL

## 2025-06-25 DIAGNOSIS — E78.5 HYPERLIPIDEMIA LDL GOAL <100: Primary | ICD-10-CM

## 2025-06-25 RX ORDER — ROSUVASTATIN CALCIUM 5 MG/1
5 TABLET, COATED ORAL
Qty: 36 TABLET | Refills: 3 | Status: SHIPPED | OUTPATIENT
Start: 2025-06-25

## 2025-06-26 NOTE — TELEPHONE ENCOUNTER
Retail Pharmacy Prior Authorization Team   Phone: [phone]    PA Initiation    Medication: OZEMPIC (0.25 OR 0.5 MG/DOSE) 2 MG/3ML SC SOPN  Insurance Company: [employer] ([employer]) - Phone [phone] Fax [phone]  Pharmacy Filling the Rx: [place], [place] - [address]  Filling Pharmacy Phone: [phone]  Filling Pharmacy Fax:    Start Date: 6/26/2025    Insurance already has an approval on file Authorized from May 25, 2025 to June 24, 2026. Called pharmacy, they were able to process and received a paid claim.  **Instructed pharmacy to notify patient when script is ready to /ship.**

## 2025-09-02 DIAGNOSIS — K59.1 FUNCTIONAL DIARRHEA: ICD-10-CM

## 2025-09-03 RX ORDER — LOPERAMIDE HYDROCHLORIDE 2 MG/1
CAPSULE ORAL
Qty: 240 CAPSULE | Refills: 9 | Status: SHIPPED | OUTPATIENT
Start: 2025-09-03

## (undated) DEVICE — SU ETHILON 3-0 PS-1 18" 1663H

## (undated) DEVICE — ENDO TROCAR BLUNT TIP KII BALLOON 12X100MM C0R47

## (undated) DEVICE — STPL RELOAD REG TISSUE ECHELON 60 X 3.6MM BLUE GST60B

## (undated) DEVICE — DRSG GAUZE 4X4" TRAY 6939

## (undated) DEVICE — DAVINCI XI NDL DRIVER LARGE 470006

## (undated) DEVICE — ESU ELEC BLADE 2.75" COATED/INSULATED E1455

## (undated) DEVICE — SUCTION MANIFOLD NEPTUNE 2 SYS 4 PORT 0702-020-000

## (undated) DEVICE — SU VICRYL 3-0 SH 8X18" UND J864D

## (undated) DEVICE — GLOVE BIOGEL PI MICRO SZ 7.0 48570

## (undated) DEVICE — GLOVE SENSICARE 7.5 MSG1075 LATEX FREE

## (undated) DEVICE — BLADE KNIFE SURG 10 371110

## (undated) DEVICE — BNDG ABDOMINAL BINDER 9X45-62" 79-89071

## (undated) DEVICE — TUBING SUCTION SIGMOIDOSCOPIC 18FR 6FT

## (undated) DEVICE — SU CHROMIC 3-0 SH 27" G122H

## (undated) DEVICE — DRAPE LEGGINGS CLEAR 8430

## (undated) DEVICE — ENDO FORCEP ENDOJAW BIOPSY 2.8MMX230CM FB-220U

## (undated) DEVICE — PREP POVIDONE-IODINE 10% SOLUTION 4OZ BOTTLE MDS093944

## (undated) DEVICE — SU VICRYL 5-0 S-14DA 18" J571G

## (undated) DEVICE — DAVINCI XI SEAL UNIVERSAL 5-8MM 470361

## (undated) DEVICE — STPL POWERED ECHELON 60MM PSEE60A

## (undated) DEVICE — SU DERMABOND ADVANCED .7ML DNX12

## (undated) DEVICE — BLADE KNIFE SURG 15 371115

## (undated) DEVICE — LINEN TOWEL PACK X6 WHITE 5487

## (undated) DEVICE — DRAIN JACKSON PRATT CHANNEL 19FR ROUND HUBLESS SIL JP-2230

## (undated) DEVICE — SPONGE KITTNER 30-101

## (undated) DEVICE — SU SILK 3-0 TIE 12X30" A304H

## (undated) DEVICE — PREP CHLORAPREP 26ML TINTED ORANGE  260815

## (undated) DEVICE — DAVINCI XI DRAPE COLUMN 470341

## (undated) DEVICE — LINEN TOWEL PACK X5 5464

## (undated) DEVICE — GOWN XLG DISP 9545

## (undated) DEVICE — SOL WATER IRRIG 1000ML BOTTLE 2F7114

## (undated) DEVICE — JELLY LUBRICATING SURGILUBE 2OZ TUBE

## (undated) DEVICE — PREP CHLORAPREP 26ML TINTED HI-LITE ORANGE 930815

## (undated) DEVICE — KIT PATIENT POSITIONING PIGAZZI LATEX FREE 40580

## (undated) DEVICE — ESU LIGASURE MARYLAND VESSEL LAP 44CM XLONG LF1944

## (undated) DEVICE — CLIP HORIZON SM RED WIDE SLOT 001201

## (undated) DEVICE — DRAPE IOBAN INCISE 23X17" 6650EZ

## (undated) DEVICE — STPL DAVINCI SUREFORM 45MM RELOAD GREEN 48345G

## (undated) DEVICE — SOL NACL 0.9% IRRIG 1000ML BOTTLE 2F7124

## (undated) DEVICE — TAPE DURAPORE 3" SILK 1538-3

## (undated) DEVICE — Device

## (undated) DEVICE — PACK GOWN 3/PK DISP XL SBA32GPFCB

## (undated) DEVICE — CLIP HORIZON MED BLUE 002200

## (undated) DEVICE — ENDO TROCAR FIRST ENTRY KII FIOS Z-THRD 05X150MM CTF01

## (undated) DEVICE — STPL DAVINCI SUREFORM 45MM STR TIPRELOAD 12FIRE 480445

## (undated) DEVICE — PROTECTOR ARM ONE-STEP TRENDELENBURG 40418

## (undated) DEVICE — ESU HOLDER LAP INST DISP PURPLE LONG 330MM H-PRO-330

## (undated) DEVICE — PREP POVIDONE IODINE SOLUTION 10% 4OZ BOTTLE 29906-004

## (undated) DEVICE — KIT NEW IMAGE COLOSTOMY/ILEOSTOMY 2 3/4" LF 19354

## (undated) DEVICE — SU MONOCRYL 4-0 PS-2 18" UND Y496G

## (undated) DEVICE — PACK SET-UP STD 9102

## (undated) DEVICE — SUCTION IRR STRYKERFLOW II W/TIP 250-070-520

## (undated) DEVICE — ENDO CAP AND TUBING STERILE FOR ENDOGATOR  100130

## (undated) DEVICE — SUCTION MANIFOLD DORNOCH ULTRA CART UL-CL500

## (undated) DEVICE — ESU ENDO SCISSORS 5MM CVD 5DCS

## (undated) DEVICE — SU PLAIN 6-0 G-1DA 18" 770G

## (undated) DEVICE — DAVINCI XI DRAPE ARM 470015

## (undated) DEVICE — ANTIFOG SOLUTION W/FOAM PAD 31142527

## (undated) DEVICE — NDL 30GA 0.5" 305106

## (undated) DEVICE — DAVINCI XI GRASPER FENESTRATED TIP UP 8MM 470347

## (undated) DEVICE — ENDO TROCAR FIRST ENTRY KII FIOS ADV FIX 05X100MM CFF03

## (undated) DEVICE — ESU GROUND PAD ADULT W/CORD E7507

## (undated) DEVICE — DRAIN JACKSON PRATT RESERVOIR 100ML SU130-1305

## (undated) DEVICE — STPL CIRCULAR 29MM CVD CDH29P

## (undated) DEVICE — SU SILK 3-0 SH 30" K832H

## (undated) DEVICE — SU STRATAFIX PDS PLUS 3-0 SPIRAL SH 15CM SXPP1B420

## (undated) DEVICE — ENDO TUBING CO2 SMARTCAP STERILE DISP 100145CO2EXT

## (undated) DEVICE — SU VICRYL 7-0 TG140-8DA 18" J546G

## (undated) DEVICE — DRAPE MAYO STAND 23X54 8337

## (undated) DEVICE — DRSG PRIMAPORE 02X3" 7133

## (undated) DEVICE — PEN MARKING SKIN VISIMARK 1424SR

## (undated) DEVICE — KIT ENDO FIRST STEP DISINFECTANT 200ML W/POUCH EP-4

## (undated) DEVICE — SUCTION TIP YANKAUER STR K87

## (undated) DEVICE — SPECIMEN CONTAINER 3OZ W/FORMALIN 59901

## (undated) DEVICE — SU PDS II 1 CTX 36" Z371T

## (undated) DEVICE — DEVICE SUTURE PASSER 14GA WECK EFX EFXSP2

## (undated) DEVICE — SOL WATER IRRIG 3000ML BAG 2B7117

## (undated) DEVICE — GLOVE BIOGEL PI MICRO INDICATOR UNDERGLOVE SZ 7.5 48975

## (undated) DEVICE — SU VICRYL 2-0 TIE 54" J615H

## (undated) DEVICE — BASIN SET SINGLE STERILE 13752-624

## (undated) DEVICE — ESU ELEC BLADE 6" COATED E1450-6

## (undated) DEVICE — LABEL MEDICATION SYSTEM 3303-P

## (undated) DEVICE — ENDO TROCAR FIRST ENTRY KII FIOS Z-THRD 12X150MM CTF71

## (undated) DEVICE — NDL INSUFFLATION 13GA 150MM C2202

## (undated) DEVICE — SU VICRYL 0 TIE 54" J608H

## (undated) DEVICE — SU ETHILON 2-0 FS 18" 664H

## (undated) DEVICE — SU VICRYL 0 UR-6 27" J603H

## (undated) DEVICE — GLOVE PROTEXIS BLUE W/NEU-THERA 6.0  2D73EB60

## (undated) DEVICE — SOL WATER IRRIG 500ML BOTTLE 2F7113

## (undated) DEVICE — ADH SKIN CLOSURE PREMIERPRO EXOFIN 1.0ML 3470

## (undated) DEVICE — ESU LIGASURE LAPAROSCOPIC BLUNT TIP SEALER 5MMX44CM LF1844

## (undated) DEVICE — SYR 10ML FINGER CONTROL W/O NDL 309695

## (undated) DEVICE — CUP AND LID 2PK 2OZ STERILE  SSK9006A

## (undated) DEVICE — WIPES FOLEY CARE SURESTEP PROVON DFC100

## (undated) DEVICE — ENDO TROCAR FIRST ENTRY KII FIOS Z-THRD 12X100MM CTF73

## (undated) DEVICE — DECANTER VIAL 2006S

## (undated) DEVICE — SUCTION TIP YANKAUER W/O VENT K86

## (undated) DEVICE — DRSG ABDOMINAL 07 1/2X8" 7197D

## (undated) DEVICE — DAVINCI XI HANDPIECE ESU VESSEL SEALER 8MM EXT 480422

## (undated) DEVICE — SPONGE LAP 18X18" X8435

## (undated) DEVICE — LINEN GOWN XLG 5407

## (undated) DEVICE — STPL LINEAR CUT 100MM TLC10

## (undated) DEVICE — DAVINCI XI MONOPOLAR SCISSORS HOT SHEARS 8MM 470179

## (undated) DEVICE — SU VICRYL 0 CT-2 27" J334H

## (undated) DEVICE — PACK LAP CHOLE CUSTOM ASC

## (undated) DEVICE — ENDO ACCESS PLATFORM GELPOINT MINI CNGL3

## (undated) DEVICE — BLADE CLIPPER SGL USE 9680

## (undated) DEVICE — SU PROLENE 2-0 SHDA 36" 8523H

## (undated) DEVICE — DRAIN PENROSE 1/4"X18" LATEX  30416-025

## (undated) DEVICE — ANTIFOG SOLUTION W/FOAM PAD CF-1001

## (undated) DEVICE — DRSG TELFA 3X8" 1238

## (undated) DEVICE — PACK DAVINCI UROL

## (undated) DEVICE — ENDO CONNECTOR ENDOGATOR AUX WATER JET FOR OLYMPUS SCOPE

## (undated) DEVICE — GLOVE PROTEXIS POWDER FREE SMT 8.0  2D72PT80X

## (undated) DEVICE — DRAPE POUCH INSTRUMENT 1018

## (undated) DEVICE — ESU GROUND PAD ADULT REM W/15' CORD E7507DB

## (undated) DEVICE — SYSTEM CLEARIFY VISUALIZATION 21-345

## (undated) DEVICE — SOL ADH LIQUID BENZOIN SWAB 0.6ML C1544

## (undated) DEVICE — ESU PENCIL W/COATED BLADE E2450H

## (undated) DEVICE — ENDO SCOPE WARMER SEAL  C3101

## (undated) DEVICE — TUBING SUCTION 10'X3/16" N510

## (undated) DEVICE — DRAPE SHEET MED 44X70" 9355

## (undated) DEVICE — VESSEL LOOPS YELLOW MAXI 31145694

## (undated) DEVICE — SPECIMEN CONTAINER 5OZ STERILE 2600SA

## (undated) DEVICE — NDL INSUFFLATION 13GA 120MM C2201

## (undated) DEVICE — SU VICRYL 3-0 SH 27" J316H

## (undated) DEVICE — TUBING FILTER TRI-LUMEN AIRSEAL ASC-EVAC1

## (undated) DEVICE — DAVINCI HOT SHEARS TIP COVER  400180

## (undated) DEVICE — DRAPE U SPLIT 74X120" 29440

## (undated) DEVICE — SYR 50ML SLIP TIP W/O NDL 309654

## (undated) DEVICE — TUBING INSUFFLATION W/FILTER CPC TO LUER 620-030-301

## (undated) DEVICE — GLOVE BIOGEL PI MICRO SZ 7.5 48575

## (undated) DEVICE — LINEN TOWEL PACK X30 5481

## (undated) DEVICE — GLOVE PROTEXIS MICRO 7.5  2D73PM75

## (undated) DEVICE — NDL COUNTER 20CT 31142493

## (undated) DEVICE — SWAB PROCTO 16" 2/PK 32-046

## (undated) DEVICE — CATH TRAY FOLEY SURESTEP 16FR W/URNE MTR STLK LATEX A303316A

## (undated) DEVICE — SYR 03ML LL W/O NDL 309657

## (undated) DEVICE — SU MONOCRYL 4-0 PS-2 27" UND Y426H

## (undated) DEVICE — SU WND CLOSURE VLOC 180 ABS 3-0 6" V-20 VLOCL0604

## (undated) DEVICE — DRSG GAUZE FLUFTEX RADIOPAQUE 4.5"X4.1YD 11-020

## (undated) DEVICE — STPL LINEAR CUT 75MM TLC75

## (undated) DEVICE — EYE PREP BETADINE 5% SOLUTION 30ML 0065-0411-30

## (undated) DEVICE — WIPE PREMOIST CLEANSING WASHCLOTHS 7988

## (undated) DEVICE — DRAIN JACKSON PRATT 15FR ROUND SU130-1323

## (undated) DEVICE — GLOVE PROTEXIS MICRO 6.0  2D73PM60

## (undated) DEVICE — STPL RELOAD LINEAR CUT 100X3.8MM TCR10

## (undated) DEVICE — SU PDS II 4-0 FS-2 27" Z422H

## (undated) DEVICE — PACK MINOR EYE CUSTOM ASC

## (undated) DEVICE — ESU EYE HIGH TEMP 65410-183

## (undated) DEVICE — SU SILK 0 TIE 6X30" A306H

## (undated) DEVICE — STPL LINEAR 90 X 3.5MM TA9035S

## (undated) DEVICE — DRSG TEGADERM 2 3/8X2 3/4" 1624W

## (undated) DEVICE — SYSTEM LAPAROVUE VISIBILITY LAPVUE10

## (undated) DEVICE — SU PDS II 3-0 SH 27" Z316H

## (undated) DEVICE — ENDO POUCH UNIVERSAL RETRIEVAL SYSTEM INZII 12/15MM CD004

## (undated) DEVICE — DAVINCI XI FCP BIPOLAR FENESTRATED 470205

## (undated) DEVICE — DAVINCI XI GRASPER PROGRASP 8MM EXT 471093

## (undated) DEVICE — DAVINCI XI NDL DRIVER MEGA SUTURE CUT 8MM 470309

## (undated) DEVICE — SU SILK 2-0 TIE 12X30" A305H

## (undated) DEVICE — SU VICRYL+ 3-0 27IN SH UND VCP416H

## (undated) DEVICE — ENDO TROCAR CONMED AIRSEAL BLADELESS 08X120MM IAS8-120LP

## (undated) DEVICE — LIGHT HANDLE X1 31140133

## (undated) DEVICE — STPL POWERED ECHELON LONG 60MM PLEE60A

## (undated) DEVICE — KIT CONNECTOR FOR OLYMPUS ENDOSCOPES DEFENDO 100310

## (undated) DEVICE — DAVINCI XI REDUCER 8-12MM 470381

## (undated) RX ORDER — PROPOFOL 10 MG/ML
INJECTION, EMULSION INTRAVENOUS
Status: DISPENSED
Start: 2021-06-15

## (undated) RX ORDER — KETOROLAC TROMETHAMINE 30 MG/ML
INJECTION, SOLUTION INTRAMUSCULAR; INTRAVENOUS
Status: DISPENSED
Start: 2024-07-16

## (undated) RX ORDER — ONDANSETRON 2 MG/ML
INJECTION INTRAMUSCULAR; INTRAVENOUS
Status: DISPENSED
Start: 2024-07-16

## (undated) RX ORDER — LIDOCAINE HYDROCHLORIDE 20 MG/ML
INJECTION, SOLUTION EPIDURAL; INFILTRATION; INTRACAUDAL; PERINEURAL
Status: DISPENSED
Start: 2021-06-15

## (undated) RX ORDER — LIDOCAINE HYDROCHLORIDE 10 MG/ML
INJECTION, SOLUTION EPIDURAL; INFILTRATION; INTRACAUDAL; PERINEURAL
Status: DISPENSED
Start: 2017-07-25

## (undated) RX ORDER — FENTANYL CITRATE 50 UG/ML
INJECTION, SOLUTION INTRAMUSCULAR; INTRAVENOUS
Status: DISPENSED
Start: 2017-10-23

## (undated) RX ORDER — FENTANYL CITRATE 50 UG/ML
INJECTION, SOLUTION INTRAMUSCULAR; INTRAVENOUS
Status: DISPENSED
Start: 2023-09-05

## (undated) RX ORDER — HYDROMORPHONE HYDROCHLORIDE 1 MG/ML
INJECTION, SOLUTION INTRAMUSCULAR; INTRAVENOUS; SUBCUTANEOUS
Status: DISPENSED
Start: 2023-12-28

## (undated) RX ORDER — ACETAMINOPHEN 10 MG/ML
INJECTION, SOLUTION INTRAVENOUS
Status: DISPENSED
Start: 2017-07-25

## (undated) RX ORDER — FENTANYL CITRATE 50 UG/ML
INJECTION, SOLUTION INTRAMUSCULAR; INTRAVENOUS
Status: DISPENSED
Start: 2024-07-16

## (undated) RX ORDER — HYDROMORPHONE HYDROCHLORIDE 1 MG/ML
INJECTION, SOLUTION INTRAMUSCULAR; INTRAVENOUS; SUBCUTANEOUS
Status: DISPENSED
Start: 2021-06-15

## (undated) RX ORDER — FENTANYL CITRATE 50 UG/ML
INJECTION, SOLUTION INTRAMUSCULAR; INTRAVENOUS
Status: DISPENSED
Start: 2021-05-07

## (undated) RX ORDER — ROCURONIUM BROMIDE 50 MG/5 ML
SYRINGE (ML) INTRAVENOUS
Status: DISPENSED
Start: 2017-10-23

## (undated) RX ORDER — HYDROMORPHONE HCL IN WATER/PF 6 MG/30 ML
PATIENT CONTROLLED ANALGESIA SYRINGE INTRAVENOUS
Status: DISPENSED
Start: 2023-09-05

## (undated) RX ORDER — ENOXAPARIN SODIUM 100 MG/ML
INJECTION SUBCUTANEOUS
Status: DISPENSED
Start: 2023-12-28

## (undated) RX ORDER — PROPOFOL 10 MG/ML
INJECTION, EMULSION INTRAVENOUS
Status: DISPENSED
Start: 2024-07-16

## (undated) RX ORDER — ONDANSETRON 2 MG/ML
INJECTION INTRAMUSCULAR; INTRAVENOUS
Status: DISPENSED
Start: 2023-12-28

## (undated) RX ORDER — CEFAZOLIN SODIUM 2 G/100ML
INJECTION, SOLUTION INTRAVENOUS
Status: DISPENSED
Start: 2021-06-15

## (undated) RX ORDER — PROPOFOL 10 MG/ML
INJECTION, EMULSION INTRAVENOUS
Status: DISPENSED
Start: 2017-07-25

## (undated) RX ORDER — PROPOFOL 10 MG/ML
INJECTION, EMULSION INTRAVENOUS
Status: DISPENSED
Start: 2020-02-25

## (undated) RX ORDER — GLYCOPYRROLATE 0.2 MG/ML
INJECTION, SOLUTION INTRAMUSCULAR; INTRAVENOUS
Status: DISPENSED
Start: 2023-09-05

## (undated) RX ORDER — KETAMINE HCL IN 0.9 % NACL 50 MG/5 ML
SYRINGE (ML) INTRAVENOUS
Status: DISPENSED
Start: 2020-02-25

## (undated) RX ORDER — FENTANYL CITRATE-0.9 % NACL/PF 10 MCG/ML
PLASTIC BAG, INJECTION (ML) INTRAVENOUS
Status: DISPENSED
Start: 2023-09-05

## (undated) RX ORDER — CEFAZOLIN SODIUM/WATER 2 G/20 ML
SYRINGE (ML) INTRAVENOUS
Status: DISPENSED
Start: 2023-09-05

## (undated) RX ORDER — OXYCODONE HYDROCHLORIDE 5 MG/1
TABLET ORAL
Status: DISPENSED
Start: 2024-07-16

## (undated) RX ORDER — DEXAMETHASONE SODIUM PHOSPHATE 4 MG/ML
INJECTION, SOLUTION INTRA-ARTICULAR; INTRALESIONAL; INTRAMUSCULAR; INTRAVENOUS; SOFT TISSUE
Status: DISPENSED
Start: 2020-02-25

## (undated) RX ORDER — ESMOLOL HYDROCHLORIDE 10 MG/ML
INJECTION INTRAVENOUS
Status: DISPENSED
Start: 2021-06-15

## (undated) RX ORDER — ONDANSETRON 2 MG/ML
INJECTION INTRAMUSCULAR; INTRAVENOUS
Status: DISPENSED
Start: 2021-06-15

## (undated) RX ORDER — ACETAMINOPHEN 325 MG/1
TABLET ORAL
Status: DISPENSED
Start: 2023-12-28

## (undated) RX ORDER — HYDROMORPHONE HYDROCHLORIDE 1 MG/ML
INJECTION, SOLUTION INTRAMUSCULAR; INTRAVENOUS; SUBCUTANEOUS
Status: DISPENSED
Start: 2017-10-23

## (undated) RX ORDER — SIMETHICONE 40MG/0.6ML
SUSPENSION, DROPS(FINAL DOSAGE FORM)(ML) ORAL
Status: DISPENSED
Start: 2021-05-07

## (undated) RX ORDER — DEXAMETHASONE SODIUM PHOSPHATE 4 MG/ML
INJECTION, SOLUTION INTRA-ARTICULAR; INTRALESIONAL; INTRAMUSCULAR; INTRAVENOUS; SOFT TISSUE
Status: DISPENSED
Start: 2021-06-15

## (undated) RX ORDER — MEPERIDINE HYDROCHLORIDE 25 MG/ML
INJECTION INTRAMUSCULAR; INTRAVENOUS; SUBCUTANEOUS
Status: DISPENSED
Start: 2017-10-18

## (undated) RX ORDER — PROPARACAINE HYDROCHLORIDE 5 MG/ML
SOLUTION/ DROPS OPHTHALMIC
Status: DISPENSED
Start: 2017-07-25

## (undated) RX ORDER — HYDROMORPHONE HYDROCHLORIDE 1 MG/ML
INJECTION, SOLUTION INTRAMUSCULAR; INTRAVENOUS; SUBCUTANEOUS
Status: DISPENSED
Start: 2024-07-16

## (undated) RX ORDER — LORAZEPAM 2 MG/ML
INJECTION INTRAMUSCULAR
Status: DISPENSED
Start: 2021-06-15

## (undated) RX ORDER — ACETAMINOPHEN 500 MG
TABLET ORAL
Status: DISPENSED
Start: 2017-07-25

## (undated) RX ORDER — FENTANYL CITRATE 50 UG/ML
INJECTION, SOLUTION INTRAMUSCULAR; INTRAVENOUS
Status: DISPENSED
Start: 2017-10-18

## (undated) RX ORDER — LIDOCAINE HYDROCHLORIDE AND EPINEPHRINE BITARTRATE 20; .01 MG/ML; MG/ML
INJECTION, SOLUTION SUBCUTANEOUS
Status: DISPENSED
Start: 2017-06-13

## (undated) RX ORDER — HYDROMORPHONE HCL IN WATER/PF 6 MG/30 ML
PATIENT CONTROLLED ANALGESIA SYRINGE INTRAVENOUS
Status: DISPENSED
Start: 2023-12-28

## (undated) RX ORDER — ACETAMINOPHEN 325 MG/1
TABLET ORAL
Status: DISPENSED
Start: 2021-06-15

## (undated) RX ORDER — ACETAMINOPHEN 325 MG/1
TABLET ORAL
Status: DISPENSED
Start: 2024-07-16

## (undated) RX ORDER — ONDANSETRON 2 MG/ML
INJECTION INTRAMUSCULAR; INTRAVENOUS
Status: DISPENSED
Start: 2020-02-25

## (undated) RX ORDER — PROPOFOL 10 MG/ML
INJECTION, EMULSION INTRAVENOUS
Status: DISPENSED
Start: 2017-10-23

## (undated) RX ORDER — LIDOCAINE HYDROCHLORIDE 20 MG/ML
INJECTION, SOLUTION EPIDURAL; INFILTRATION; INTRACAUDAL; PERINEURAL
Status: DISPENSED
Start: 2020-02-25

## (undated) RX ORDER — METOCLOPRAMIDE HYDROCHLORIDE 5 MG/ML
INJECTION INTRAMUSCULAR; INTRAVENOUS
Status: DISPENSED
Start: 2020-02-25

## (undated) RX ORDER — FENTANYL CITRATE 50 UG/ML
INJECTION, SOLUTION INTRAMUSCULAR; INTRAVENOUS
Status: DISPENSED
Start: 2017-07-25

## (undated) RX ORDER — ONDANSETRON 2 MG/ML
INJECTION INTRAMUSCULAR; INTRAVENOUS
Status: DISPENSED
Start: 2017-10-23

## (undated) RX ORDER — ACETAMINOPHEN 325 MG/1
TABLET ORAL
Status: DISPENSED
Start: 2017-07-25

## (undated) RX ORDER — FENTANYL CITRATE 50 UG/ML
INJECTION, SOLUTION INTRAMUSCULAR; INTRAVENOUS
Status: DISPENSED
Start: 2023-12-28

## (undated) RX ORDER — BUPIVACAINE HYDROCHLORIDE 2.5 MG/ML
INJECTION, SOLUTION EPIDURAL; INFILTRATION; INTRACAUDAL
Status: DISPENSED
Start: 2023-12-28

## (undated) RX ORDER — ONDANSETRON 2 MG/ML
INJECTION INTRAMUSCULAR; INTRAVENOUS
Status: DISPENSED
Start: 2023-09-05

## (undated) RX ORDER — CEFAZOLIN SODIUM 2 G/50ML
SOLUTION INTRAVENOUS
Status: DISPENSED
Start: 2024-07-16

## (undated) RX ORDER — ACETAMINOPHEN 325 MG/1
TABLET ORAL
Status: DISPENSED
Start: 2020-02-25

## (undated) RX ORDER — EPHEDRINE SULFATE 50 MG/ML
INJECTION, SOLUTION INTRAMUSCULAR; INTRAVENOUS; SUBCUTANEOUS
Status: DISPENSED
Start: 2021-06-15

## (undated) RX ORDER — DEXAMETHASONE SODIUM PHOSPHATE 4 MG/ML
INJECTION, SOLUTION INTRA-ARTICULAR; INTRALESIONAL; INTRAMUSCULAR; INTRAVENOUS; SOFT TISSUE
Status: DISPENSED
Start: 2017-10-23

## (undated) RX ORDER — BACITRACIN 500 [USP'U]/G
OINTMENT OPHTHALMIC
Status: DISPENSED
Start: 2017-07-25

## (undated) RX ORDER — ACETAMINOPHEN 325 MG/1
TABLET ORAL
Status: DISPENSED
Start: 2023-09-05

## (undated) RX ORDER — GLYCOPYRROLATE 0.2 MG/ML
INJECTION, SOLUTION INTRAMUSCULAR; INTRAVENOUS
Status: DISPENSED
Start: 2021-06-15

## (undated) RX ORDER — FENTANYL CITRATE 50 UG/ML
INJECTION, SOLUTION INTRAMUSCULAR; INTRAVENOUS
Status: DISPENSED
Start: 2021-06-15

## (undated) RX ORDER — HYDROMORPHONE HYDROCHLORIDE 1 MG/ML
INJECTION, SOLUTION INTRAMUSCULAR; INTRAVENOUS; SUBCUTANEOUS
Status: DISPENSED
Start: 2020-02-25

## (undated) RX ORDER — PROPOFOL 10 MG/ML
INJECTION, EMULSION INTRAVENOUS
Status: DISPENSED
Start: 2023-09-05

## (undated) RX ORDER — CEFAZOLIN SODIUM 1 G/3ML
INJECTION, POWDER, FOR SOLUTION INTRAMUSCULAR; INTRAVENOUS
Status: DISPENSED
Start: 2023-09-05

## (undated) RX ORDER — CEFAZOLIN SODIUM/WATER 2 G/20 ML
SYRINGE (ML) INTRAVENOUS
Status: DISPENSED
Start: 2023-12-28

## (undated) RX ORDER — DIPHENHYDRAMINE HYDROCHLORIDE 50 MG/ML
INJECTION INTRAMUSCULAR; INTRAVENOUS
Status: DISPENSED
Start: 2017-10-18

## (undated) RX ORDER — ONDANSETRON 2 MG/ML
INJECTION INTRAMUSCULAR; INTRAVENOUS
Status: DISPENSED
Start: 2017-07-25

## (undated) RX ORDER — DIPHENHYDRAMINE HYDROCHLORIDE 50 MG/ML
INJECTION INTRAMUSCULAR; INTRAVENOUS
Status: DISPENSED
Start: 2018-03-09

## (undated) RX ORDER — SODIUM CHLORIDE, SODIUM LACTATE, POTASSIUM CHLORIDE, CALCIUM CHLORIDE 600; 310; 30; 20 MG/100ML; MG/100ML; MG/100ML; MG/100ML
INJECTION, SOLUTION INTRAVENOUS
Status: DISPENSED
Start: 2017-10-23

## (undated) RX ORDER — ACETIC ACID 5 %
LIQUID (ML) MISCELLANEOUS
Status: DISPENSED
Start: 2020-02-25

## (undated) RX ORDER — IODINE AND POTASSIUM IODIDE 50; 100 MG/ML; MG/ML
LIQUID ORAL
Status: DISPENSED
Start: 2020-02-25

## (undated) RX ORDER — DEXAMETHASONE SODIUM PHOSPHATE 4 MG/ML
INJECTION, SOLUTION INTRA-ARTICULAR; INTRALESIONAL; INTRAMUSCULAR; INTRAVENOUS; SOFT TISSUE
Status: DISPENSED
Start: 2024-07-16

## (undated) RX ORDER — FENTANYL CITRATE-0.9 % NACL/PF 10 MCG/ML
PLASTIC BAG, INJECTION (ML) INTRAVENOUS
Status: DISPENSED
Start: 2021-06-15

## (undated) RX ORDER — DIAZEPAM 5 MG
TABLET ORAL
Status: DISPENSED
Start: 2017-07-25

## (undated) RX ORDER — LIDOCAINE HYDROCHLORIDE 20 MG/ML
INJECTION, SOLUTION EPIDURAL; INFILTRATION; INTRACAUDAL; PERINEURAL
Status: DISPENSED
Start: 2017-10-23

## (undated) RX ORDER — OXYCODONE HYDROCHLORIDE 10 MG/1
TABLET ORAL
Status: DISPENSED
Start: 2023-12-28

## (undated) RX ORDER — METRONIDAZOLE 500 MG/100ML
INJECTION, SOLUTION INTRAVENOUS
Status: DISPENSED
Start: 2023-12-28